# Patient Record
Sex: FEMALE | Race: WHITE | NOT HISPANIC OR LATINO | Employment: OTHER | URBAN - METROPOLITAN AREA
[De-identification: names, ages, dates, MRNs, and addresses within clinical notes are randomized per-mention and may not be internally consistent; named-entity substitution may affect disease eponyms.]

---

## 2016-10-04 LAB — PHQ9 SCORE: 20

## 2016-10-18 LAB — PHQ9 SCORE: 15

## 2016-11-08 LAB — PHQ9 SCORE: 18

## 2016-11-10 LAB — PHQ9 SCORE: 17

## 2016-11-22 LAB — PHQ9 SCORE: 11

## 2016-12-12 LAB — PHQ9 SCORE: 12

## 2016-12-14 LAB — PHQ9 SCORE: 12

## 2017-01-04 ENCOUNTER — APPOINTMENT (OUTPATIENT)
Dept: GENERAL RADIOLOGY | Facility: CLINIC | Age: 27
End: 2017-01-04
Attending: EMERGENCY MEDICINE
Payer: MEDICAID

## 2017-01-04 ENCOUNTER — HOSPITAL ENCOUNTER (EMERGENCY)
Facility: CLINIC | Age: 27
Discharge: HOME | End: 2017-01-05
Attending: EMERGENCY MEDICINE | Admitting: EMERGENCY MEDICINE
Payer: MEDICAID

## 2017-01-04 DIAGNOSIS — T18.5XXA FOREIGN BODY IN ANUS AND RECTUM, INITIAL ENCOUNTER: ICD-10-CM

## 2017-01-04 LAB
ANION GAP SERPL CALCULATED.3IONS-SCNC: 7 MMOL/L (ref 3–14)
BASOPHILS # BLD AUTO: 0 10E9/L (ref 0–0.2)
BASOPHILS NFR BLD AUTO: 0.1 %
BUN SERPL-MCNC: 10 MG/DL (ref 7–30)
CALCIUM SERPL-MCNC: 8.7 MG/DL (ref 8.5–10.1)
CHLORIDE SERPL-SCNC: 114 MMOL/L (ref 94–109)
CO2 SERPL-SCNC: 23 MMOL/L (ref 20–32)
CREAT SERPL-MCNC: 0.59 MG/DL (ref 0.52–1.04)
DIFFERENTIAL METHOD BLD: ABNORMAL
EOSINOPHIL # BLD AUTO: 0.1 10E9/L (ref 0–0.7)
EOSINOPHIL NFR BLD AUTO: 0.8 %
ERYTHROCYTE [DISTWIDTH] IN BLOOD BY AUTOMATED COUNT: 13.4 % (ref 10–15)
GFR SERPL CREATININE-BSD FRML MDRD: ABNORMAL ML/MIN/1.7M2
GLUCOSE SERPL-MCNC: 100 MG/DL (ref 70–99)
HCT VFR BLD AUTO: 35.5 % (ref 35–47)
HGB BLD-MCNC: 11.6 G/DL (ref 11.7–15.7)
IMM GRANULOCYTES # BLD: 0 10E9/L (ref 0–0.4)
IMM GRANULOCYTES NFR BLD: 0.1 %
LYMPHOCYTES # BLD AUTO: 1.5 10E9/L (ref 0.8–5.3)
LYMPHOCYTES NFR BLD AUTO: 15.5 %
MCH RBC QN AUTO: 29.2 PG (ref 26.5–33)
MCHC RBC AUTO-ENTMCNC: 32.7 G/DL (ref 31.5–36.5)
MCV RBC AUTO: 89 FL (ref 78–100)
MONOCYTES # BLD AUTO: 0.7 10E9/L (ref 0–1.3)
MONOCYTES NFR BLD AUTO: 6.8 %
NEUTROPHILS # BLD AUTO: 7.6 10E9/L (ref 1.6–8.3)
NEUTROPHILS NFR BLD AUTO: 76.7 %
NRBC # BLD AUTO: 0 10*3/UL
NRBC BLD AUTO-RTO: 0 /100
PHQ9 SCORE: 13
PLATELET # BLD AUTO: 261 10E9/L (ref 150–450)
POTASSIUM SERPL-SCNC: 3.5 MMOL/L (ref 3.4–5.3)
RBC # BLD AUTO: 3.97 10E12/L (ref 3.8–5.2)
SODIUM SERPL-SCNC: 144 MMOL/L (ref 133–144)
WBC # BLD AUTO: 9.9 10E9/L (ref 4–11)

## 2017-01-04 PROCEDURE — 25000125 ZZHC RX 250: Performed by: SURGERY

## 2017-01-04 PROCEDURE — 96375 TX/PRO/DX INJ NEW DRUG ADDON: CPT | Performed by: EMERGENCY MEDICINE

## 2017-01-04 PROCEDURE — 99285 EMERGENCY DEPT VISIT HI MDM: CPT | Mod: 25 | Performed by: EMERGENCY MEDICINE

## 2017-01-04 PROCEDURE — 46608 ANOSCOPY REMOVE FOR BODY: CPT | Performed by: EMERGENCY MEDICINE

## 2017-01-04 PROCEDURE — 96376 TX/PRO/DX INJ SAME DRUG ADON: CPT | Mod: 59 | Performed by: EMERGENCY MEDICINE

## 2017-01-04 PROCEDURE — 85025 COMPLETE CBC W/AUTO DIFF WBC: CPT | Performed by: EMERGENCY MEDICINE

## 2017-01-04 PROCEDURE — 25000125 ZZHC RX 250: Performed by: EMERGENCY MEDICINE

## 2017-01-04 PROCEDURE — 96374 THER/PROPH/DIAG INJ IV PUSH: CPT | Performed by: EMERGENCY MEDICINE

## 2017-01-04 PROCEDURE — 80048 BASIC METABOLIC PNL TOTAL CA: CPT | Performed by: EMERGENCY MEDICINE

## 2017-01-04 PROCEDURE — 99285 EMERGENCY DEPT VISIT HI MDM: CPT | Mod: Z6 | Performed by: EMERGENCY MEDICINE

## 2017-01-04 PROCEDURE — 74000 XR ABDOMEN 1 VW: CPT

## 2017-01-04 RX ORDER — HYDROMORPHONE HYDROCHLORIDE 1 MG/ML
.5-1 INJECTION, SOLUTION INTRAMUSCULAR; INTRAVENOUS; SUBCUTANEOUS ONCE
Status: COMPLETED | OUTPATIENT
Start: 2017-01-04 | End: 2017-01-04

## 2017-01-04 RX ORDER — LIDOCAINE HYDROCHLORIDE AND EPINEPHRINE 10; 10 MG/ML; UG/ML
INJECTION, SOLUTION INFILTRATION; PERINEURAL
Status: DISCONTINUED
Start: 2017-01-04 | End: 2017-01-05 | Stop reason: HOSPADM

## 2017-01-04 RX ORDER — ONDANSETRON 2 MG/ML
4 INJECTION INTRAMUSCULAR; INTRAVENOUS ONCE
Status: COMPLETED | OUTPATIENT
Start: 2017-01-04 | End: 2017-01-04

## 2017-01-04 RX ADMIN — ONDANSETRON 4 MG: 2 INJECTION INTRAMUSCULAR; INTRAVENOUS at 23:19

## 2017-01-04 RX ADMIN — HYDROMORPHONE HYDROCHLORIDE 1 MG: 10 INJECTION, SOLUTION INTRAMUSCULAR; INTRAVENOUS; SUBCUTANEOUS at 23:19

## 2017-01-04 RX ADMIN — ONDANSETRON 4 MG: 2 INJECTION INTRAMUSCULAR; INTRAVENOUS at 20:15

## 2017-01-04 RX ADMIN — MIDAZOLAM 0.5 MG: 1 INJECTION INTRAMUSCULAR; INTRAVENOUS at 23:32

## 2017-01-04 RX ADMIN — HYDROMORPHONE HYDROCHLORIDE 1 MG: 1 INJECTION, SOLUTION INTRAMUSCULAR; INTRAVENOUS; SUBCUTANEOUS at 19:56

## 2017-01-04 ASSESSMENT — ENCOUNTER SYMPTOMS
FEVER: 0
NAUSEA: 0
SHORTNESS OF BREATH: 0
VOMITING: 0
ABDOMINAL PAIN: 0

## 2017-01-04 NOTE — ED AVS SNAPSHOT
[facility], Emergency Department    [address]    [facility] [address]    Phone:  [phone]    Fax:  [phone]                                       Nevin Alvarado   MRN: [id number]    Department:  [facility], Emergency Department   Date of Visit:  1/4/2017           After Visit Summary Signature Page     I have received my discharge instructions, and my questions have been answered. I have discussed any challenges I see with this plan with the nurse or doctor.    ..........................................................................................................................................  Patient/Patient Representative Signature      ..........................................................................................................................................  Patient Representative Print Name and Relationship to Patient    ..................................................               ................................................  Date                                            Time    ..........................................................................................................................................  Reviewed by Signature/Title    ...................................................              ..............................................  Date                                                            Time

## 2017-01-05 VITALS
SYSTOLIC BLOOD PRESSURE: 129 MMHG | TEMPERATURE: 97.8 F | HEIGHT: 62 IN | OXYGEN SATURATION: 97 % | DIASTOLIC BLOOD PRESSURE: 77 MMHG | HEART RATE: 98 BPM | RESPIRATION RATE: 16 BRPM

## 2017-01-05 NOTE — DISCHARGE INSTRUCTIONS
You have been seen in the ER for a rectal foreign body.  We have removed this.  You are likely to be sore tomorrow.  This is normal.  You may notice a small amount of bleeding which will be normal for a day or so.    You should increase your miralax to twice a day for the next 3-5 days to keep your stool very soft.     We recommend avoiding using any sex toys or foreign bodies in the rectum, as these can cause significant injury and some people even require surgery and colostomies to fix injury that is caused by this.    If you notice continuing bleeding, fever > 101 or severe pain, come back to the ER for a recheck.     If you have any ongoing rectal issues, you can certainly follow up in the colorectal clinic listed below.      Please make an appointment to follow up with [facility] (phone: [phone]).
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
## 2017-01-09 ENCOUNTER — OFFICE VISIT (OUTPATIENT)
Dept: INTERNAL MEDICINE | Facility: CLINIC | Age: 27
End: 2017-01-09
Payer: MEDICAID

## 2017-01-09 VITALS
OXYGEN SATURATION: 98 % | HEART RATE: 86 BPM | DIASTOLIC BLOOD PRESSURE: 70 MMHG | WEIGHT: 227.4 LBS | HEIGHT: 62 IN | SYSTOLIC BLOOD PRESSURE: 110 MMHG | BODY MASS INDEX: 41.85 KG/M2 | TEMPERATURE: 98.4 F

## 2017-01-09 DIAGNOSIS — Z76.89 ENCOUNTER TO ESTABLISH CARE: Primary | ICD-10-CM

## 2017-01-09 DIAGNOSIS — Z76.89 REFERRAL OF PATIENT: ICD-10-CM

## 2017-01-09 PROCEDURE — 99203 OFFICE O/P NEW LOW 30 MIN: CPT | Performed by: INTERNAL MEDICINE

## 2017-01-09 RX ORDER — IBUPROFEN 200 MG
600 TABLET ORAL EVERY 6 HOURS PRN
Qty: 100 TABLET | COMMUNITY
Start: 2017-01-09 | End: 2017-03-07

## 2017-01-09 NOTE — PROGRESS NOTES
"  SUBJECTIVE:                                                      HPI: Nevin Alvarado is a pleasant 26 year old female who presents to establish care:    Previous care at [facility] and [facility] - outside records reviewed.     Also requests referral to see GI for chronic abdominal pain and re: foreign objects removed from rectum (3 separate incidences since December, 2016).    Past Medical History   Diagnosis Date     Migraine without aura      no known triggers; on Topamax bid and Imitrex PRN     Depression      Borderline personality disorder      Anxiety      H/O self-harm      ADD (attention deficit disorder)      PTSD (post-traumatic stress disorder)      Anorexia nervosa with bulimia      history of; on Topamax     Morbid obesity (H)    Re: migraines: well-controlled with Topamax bid and Imitrex PRN; no known triggers  Re: depression, PTSD, BPD, anxiety, and self-harm: patient reports \"okay\" control:   - on Pristiq   - seeing psychiatry every 1-2 months   - sees therapist every week   - reports recent worsening of mood in last couple of months because therapist had been on maternity leave    - admits to hurting herself more than usual    - getting better now that therapist has returned  Re: ADD: not on treatment for this (as treatment would likely exacerbate other psychiatric diagnoses)  Re: anorexia with bulimia - not active/well-controlled on Topamax  Re: morbid obesity: patient aware, but not watching diet too closely (because of history of anorexia with bulimia) and not exercising    Past Surgical History   Procedure Laterality Date     Mammoplasty reduction Bilateral      Release carpal tunnel Bilateral      Knee surgery Right      removed a small tissue mass from knee     Head & neck surgery  age 7     lymph nodes removed from neck; benign     Laparoscopic ablation endometriosis       Hc remove fecal impaction or fb w anesthesia N/A 12/18/2016     Procedure: REMOVE FECAL IMPACTION/FOREIGN BODY " UNDER ANESTHESIA;  Surgeon: Soham Cano MD;  Location: [facility] OR     Family History   Problem Relation Age of Onset     Type 2 Diabetes Maternal Grandmother      Type 2 Diabetes Paternal Grandmother      Myocardial Infarction No family hx of      CEREBROVASCULAR DISEASE Father 53     Coronary Artery Disease Early Onset No family hx of      Breast Cancer Paternal Grandmother      Ovarian Cancer No family hx of      Colon Cancer No family hx of      Social History Main Topics     Smoking status: Never Smoker      Smokeless tobacco: Never Used     Alcohol Use: No     Drug Use: No     Sexual Activity:     Partners: Male      Comment: IUD - Mirena - placed July, 2015     Social History Narrative    Single.    Living in independent living portion of [employer].    On disability.    No regular exercise.      Allergies   Allergen Reactions     Ancef [Cefazolin Sodium]      Augmentin Swelling     Blood-Group Specific Substance Other (See Comments)     Patient has an anti-Cw and non-specific antibodies. Blood product orders may be delayed. Draw one red top and two purple top tubes for all type/screen/crossmatch orders.     Hydrocodone Nausea and Vomiting     vomiting for days     Influenza Vaccines Other (See Comments)     Oseltamivir Hives     med stopped, PN: med stopped     Vicodin [Hydrocodone-Acetaminophen] Nausea and Vomiting     Cephalosporins Rash     Current Outpatient Prescriptions   Medication Sig     ibuprofen (ADVIL/MOTRIN) 200 MG tablet Take 3 tablets (600 mg) by mouth every 6 hours as needed for other (Headache)     ondansetron (ZOFRAN) 4 MG tablet Take 1 tablet (4 mg) by mouth every 6 hours     FAMOTIDINE PO Take 20 mg by mouth 2 times daily     RaNITidine HCl (ZANTAC PO) Take by mouth daily     Desvenlafaxine Succinate (PRISTIQ PO) Take 100 mg by mouth daily     polyethylene glycol (MIRALAX/GLYCOLAX) powder Take 17 g by mouth daily     ONDANSETRON PO Take 4 mg by mouth      SUMAtriptan Succinate  "(IMITREX PO) Take 50 mg by mouth     FOLIC ACID PO Take 5 mg by mouth daily      Acetaminophen (TYLENOL PO) Take 1,000 mg by mouth every 6 hours as needed      Cholecalciferol (VITAMIN D3 PO) Take 5,000 Units by mouth daily      Topiramate (TOPAMAX PO) Take 50 mg by mouth 2 times daily Take topiramate 100 mg at bedtime; take topiramate 50 mg every morning     Immunization History   Administered Date(s) Administered     Human Papilloma Virus 05/10/2007, 07/20/2007, 12/03/2007     TDAP (ADACEL AGES 11-64) 04/16/2015     OBJECTIVE:                                                      /70 mmHg  Pulse 86  Temp(Src) 98.4  F (36.9  C) (Oral)  Ht 5' 2\" (1.575 m)  Wt 227 lb 6.4 oz (103.148 kg)  BMI 41.58 kg/m2  SpO2 98%  Constitutional: well-appearing  Eyes: normal conjunctivae and lids; pupils equal, round, and reactive to light  Ears, Nose, Mouth, and Throat: normal ears and nose; tympanic membranes visualized and normal; normal lips, teeth, and gums; no oropharyngeal lesions or ulcers  Neck: supple and symmetric; no lymphadenopathy; no thyromegaly or masses  Respiratory: normal respiratory effort; clear to auscultation bilaterally  Cardiovascular: regular rate and rhythm; pedal pulses palpable; no edema  Gastrointestinal: soft, non-tender, non-distended, and bowel sounds present; no organomegaly or masses  Musculoskeletal: normal gait and station; no clubbing or cyanosis  Psych: normal judgment and insight; normal mood and affect; recent and remote memory intact; oriented to time, place, and person    PREVENTATIVE HEALTH                                                      Blood pressure: within normal limits   Mammogram: not medically indicated at this time   Pap: last pap performed 2015; report reviewed and was normal; repeat due in 2018  Colonoscopy: not medically indicated at this time   Dexa: not medically indicated at this time   Screening HCV: not medically indicated at this time   Screening " cholesterol: not medically indicated at this time   Screening diabetes: not medically indicated at this time   STD testing: discussed with and declined by patient  Alcohol misuse screening: alcohol use reviewed - no intervention indicated at this time  Immunizations: reviewed; flu shot DUE - declined    ASSESSMENT/PLAN:                                                       (Z76.89) Encounter to establish care  (primary encounter diagnosis)  Comment:  PMH, PSH, FH, SH, medications, allergies, immunizations, and preventative health measures reviewed.   Plan: flu shot declined - no other preventative health interventions indicated.     (Z76.89) Referral of patient  Comment: requests referral to see GI for chronic abdominal pain and re: foreign objects removed from rectum (3 separate incidences since December, 2016)  Plan: referral placed - patient to schedule.     The instructions on the AVS were discussed and explained to the patient. Patient expressed understanding of instructions.    A total of 30 minutes were spent face-to-face with this patient during this encounter and over half of that time was spent on counseling and coordination of care re: above diagnoses and plans of care.     Sandra Ramos MD   [address] [address]  T: [phone], F: [phone]

## 2017-01-09 NOTE — PATIENT INSTRUCTIONS
STOP Famotidine and Zantac. If recurrent abdominal pain, can come in to discuss further.    STOP Folic Acid.     ---    I have placed a ob/gyn and GI referrals for you.    Please schedule an appointment at your earliest convenience - phone number below.

## 2017-01-09 NOTE — MR AVS SNAPSHOT
After Visit Summary   1/9/2017    Nevin Alvarado    MRN: [id number]           Patient Information     Date Of Birth          1990        Visit Information        Provider Department      1/9/2017 11:30 AM Sandra Ramos MD [facility]        Today's Diagnoses     Referral of patient    -  1       Care Instructions    STOP Famotidine and Zantac. If recurrent abdominal pain, can come in to discuss further.    STOP Folic Acid.     ---    I have placed a ob/gyn and GI referrals for you.    Please schedule an appointment at your earliest convenience - phone number below.             Follow-ups after your visit        Additional Services     GASTROENTEROLOGY ADULT REFERRAL +/- PROCEDURE       Your provider has referred you to Gastroenterology Services.    English    Procedure/Referral: REFERRAL ONLY - N: [facility] [phone]   [email or web address]    Please be aware that coverage of these services is subject to the terms and limitations of your health insurance plan.  Call member services at your health plan with any benefit or coverage questions.  Any procedures must be performed at a [facility] facility OR coordinated by your clinic's referral office.    Please bring the following with you to your appointment:    (1) Any X-Rays, CTs or MRIs which have been performed.  Contact the facility where they were done to arrange for  prior to your scheduled appointment.    (2) List of current medications   (3) This referral request   (4) Any documents/labs given to you for this referral            OB/GYN REFERRAL       Your provider has referred you to:  OTHER PROVIDERS: Marianna Ramirez MD - [facility]    Please be aware that coverage of these services is subject to the terms and limitations of your health insurance plan.  Call member services at your health plan with any benefit or coverage questions.      Please bring  "the following with you to your appointment:    (1) Any X-Rays, CTs or MRIs which have been performed.  Contact the facility where they were done to arrange for  prior to your scheduled appointment.   (2) List of current medications   (3) This referral request   (4) Any documents/labs given to you for this referral                  Who to contact     If you have questions or need follow up information about today's clinic visit or your schedule please contact [facility] directly at [phone].  Normal or non-critical lab and imaging results will be communicated to you by [employer], letter or phone within 4 business days after the clinic has received the results. If you do not hear from us within 7 days, please contact the clinic through [employer] or phone. If you have a critical or abnormal lab result, we will notify you by phone as soon as possible.  Submit refill requests through [employer] or call your pharmacy and they will forward the refill request to us. Please allow 3 business days for your refill to be completed.          Additional Information About Your Visit        [employer] Information     [employer] gives you secure access to your electronic health record. If you see a primary care provider, you can also send messages to your care team and make appointments. If you have questions, please call your primary care clinic.  If you do not have a primary care provider, please call [phone] and they will assist you.        Care EveryWhere ID     This is your Care EveryWhere ID. This could be used by other organizations to access your [place] medical records  [id number]        Your Vitals Were     Pulse Temperature Height BMI (Body Mass Index) Pulse Oximetry       86 98.4  F (36.9  C) (Oral) 5' 2\" (1.575 m) 41.58 kg/m2 98%        Blood Pressure from Last 3 Encounters:   01/09/17 110/70   01/05/17 129/77   12/27/16 101/64    Weight from Last 3 Encounters:   01/09/17 227 lb 6.4 oz " (103.148 kg)   12/26/16 228 lb (103.42 kg)   12/18/16 228 lb (103.42 kg)              We Performed the Following     GASTROENTEROLOGY ADULT REFERRAL +/- PROCEDURE     OB/GYN REFERRAL          Today's Medication Changes          These changes are accurate as of: 1/9/17 12:06 PM.  If you have any questions, ask your nurse or doctor.               These medicines have changed or have updated prescriptions.        Dose/Directions    ibuprofen 200 MG tablet   Commonly known as:  ADVIL/MOTRIN   This may have changed:    - how much to take  - when to take this   Changed by:  Sandra Ramos MD        Dose:  600 mg   Take 3 tablets (600 mg) by mouth every 6 hours as needed for other (Headache)   Quantity:  100 tablet   Refills:  0                Primary Care Provider Office Phone # Fax #    Sandra Ramos [phone]       [facility] [address] [facility] [address]        Thank you!     Thank you for choosing [facility]  for your care. Our goal is always to provide you with excellent care. Hearing back from our patients is one way we can continue to improve our services. Please take a few minutes to complete the written survey that you may receive in the mail after your visit with us. Thank you!             Your Updated Medication List - Protect others around you: Learn how to safely use, store and throw away your medicines at www.disposemymeds.org.          This list is accurate as of: 1/9/17 12:06 PM.  Always use your most recent med list.                   Brand Name Dispense Instructions for use    FAMOTIDINE PO      Take 20 mg by mouth 2 times daily       FOLIC ACID PO      Take 5 mg by mouth daily       ibuprofen 200 MG tablet    ADVIL/MOTRIN    100 tablet    Take 3 tablets (600 mg) by mouth every 6 hours as needed for other (Headache)       IMITREX PO      Take 50 mg by mouth       metoclopramide 10 MG tablet    REGLAN    20 tablet    Take 1  tablet (10 mg) by mouth 3 times daily as needed (Nausea or Vomiting)       ondansetron 4 MG tablet    ZOFRAN    8 tablet    Take 1 tablet (4 mg) by mouth every 6 hours       ONDANSETRON PO      Take 4 mg by mouth       polyethylene glycol powder    MIRALAX/GLYCOLAX     Take 17 g by mouth daily       PRISTIQ PO      Take 100 mg by mouth daily       TOPAMAX PO      Take 50 mg by mouth 2 times daily Take topiramate 100 mg at bedtime; take topiramate 50 mg every morning       TYLENOL PO      Take 1,000 mg by mouth every 6 hours as needed       VITAMIN D3 PO      Take 5,000 Units by mouth daily       ZANTAC PO      Take by mouth daily

## 2017-01-09 NOTE — NURSING NOTE
"Chief Complaint   Patient presents with     Establish Care     Switching providers.       Initial /70 mmHg  Pulse 86  Temp(Src) 98.4  F (36.9  C) (Oral)  Ht 5' 2\" (1.575 m)  Wt 227 lb 6.4 oz (103.148 kg)  BMI 41.58 kg/m2  SpO2 98% Estimated body mass index is 41.58 kg/(m^2) as calculated from the following:    Height as of this encounter: 5' 2\" (1.575 m).    Weight as of this encounter: 227 lb 6.4 oz (103.148 kg).  BP completed using cuff size: Large    Kaminibose MA      "

## 2017-01-10 ENCOUNTER — ANESTHESIA (OUTPATIENT)
Dept: SURGERY | Facility: CLINIC | Age: 27
End: 2017-01-10
Payer: MEDICAID

## 2017-01-10 ENCOUNTER — ANESTHESIA EVENT (OUTPATIENT)
Dept: SURGERY | Facility: CLINIC | Age: 27
End: 2017-01-10
Payer: MEDICAID

## 2017-01-10 ENCOUNTER — APPOINTMENT (OUTPATIENT)
Dept: CT IMAGING | Facility: CLINIC | Age: 27
End: 2017-01-10
Attending: EMERGENCY MEDICINE
Payer: MEDICAID

## 2017-01-10 ENCOUNTER — HOSPITAL ENCOUNTER (INPATIENT)
Facility: CLINIC | Age: 27
LOS: 2 days | Discharge: HOME HEALTH | End: 2017-01-13
Attending: EMERGENCY MEDICINE | Admitting: COLON & RECTAL SURGERY
Payer: MEDICAID

## 2017-01-10 DIAGNOSIS — T18.5XXD: Primary | ICD-10-CM

## 2017-01-10 DIAGNOSIS — T18.5XXA RECTAL FOREIGN BODY, INITIAL ENCOUNTER: ICD-10-CM

## 2017-01-10 LAB
ANION GAP SERPL CALCULATED.3IONS-SCNC: 9 MMOL/L (ref 3–14)
BUN SERPL-MCNC: 16 MG/DL (ref 7–30)
CALCIUM SERPL-MCNC: 8.6 MG/DL (ref 8.5–10.1)
CHLORIDE SERPL-SCNC: 112 MMOL/L (ref 94–109)
CO2 SERPL-SCNC: 23 MMOL/L (ref 20–32)
CREAT SERPL-MCNC: 0.58 MG/DL (ref 0.52–1.04)
ERYTHROCYTE [DISTWIDTH] IN BLOOD BY AUTOMATED COUNT: 13.9 % (ref 10–15)
GFR SERPL CREATININE-BSD FRML MDRD: ABNORMAL ML/MIN/1.7M2
GLUCOSE SERPL-MCNC: 98 MG/DL (ref 70–99)
HCT VFR BLD AUTO: 36.9 % (ref 35–47)
HGB BLD-MCNC: 12.2 G/DL (ref 11.7–15.7)
MCH RBC QN AUTO: 29.5 PG (ref 26.5–33)
MCHC RBC AUTO-ENTMCNC: 33.1 G/DL (ref 31.5–36.5)
MCV RBC AUTO: 89 FL (ref 78–100)
PLATELET # BLD AUTO: 250 10E9/L (ref 150–450)
POTASSIUM SERPL-SCNC: 3.7 MMOL/L (ref 3.4–5.3)
RBC # BLD AUTO: 4.14 10E12/L (ref 3.8–5.2)
SODIUM SERPL-SCNC: 143 MMOL/L (ref 133–144)
WBC # BLD AUTO: 10.6 10E9/L (ref 4–11)

## 2017-01-10 PROCEDURE — 80048 BASIC METABOLIC PNL TOTAL CA: CPT | Performed by: SURGERY

## 2017-01-10 PROCEDURE — 25000566 ZZH SEVOFLURANE, EA 15 MIN: Performed by: COLON & RECTAL SURGERY

## 2017-01-10 PROCEDURE — 25000128 H RX IP 250 OP 636: Performed by: EMERGENCY MEDICINE

## 2017-01-10 PROCEDURE — 25000128 H RX IP 250 OP 636: Performed by: ANESTHESIOLOGY

## 2017-01-10 PROCEDURE — 25000125 ZZHC RX 250: Performed by: SURGERY

## 2017-01-10 PROCEDURE — 86905 BLOOD TYPING RBC ANTIGENS: CPT | Performed by: ANESTHESIOLOGY

## 2017-01-10 PROCEDURE — 71000014 ZZH RECOVERY PHASE 1 LEVEL 2 FIRST HR: Performed by: COLON & RECTAL SURGERY

## 2017-01-10 PROCEDURE — 27210794 ZZH OR GENERAL SUPPLY STERILE: Performed by: COLON & RECTAL SURGERY

## 2017-01-10 PROCEDURE — 99285 EMERGENCY DEPT VISIT HI MDM: CPT | Mod: Z6 | Performed by: EMERGENCY MEDICINE

## 2017-01-10 PROCEDURE — 88300 SURGICAL PATH GROSS: CPT | Performed by: COLON & RECTAL SURGERY

## 2017-01-10 PROCEDURE — 36000057 ZZH SURGERY LEVEL 3 1ST 30 MIN - UMMC: Performed by: COLON & RECTAL SURGERY

## 2017-01-10 PROCEDURE — 86880 COOMBS TEST DIRECT: CPT | Performed by: ANESTHESIOLOGY

## 2017-01-10 PROCEDURE — 96374 THER/PROPH/DIAG INJ IV PUSH: CPT

## 2017-01-10 PROCEDURE — 86850 RBC ANTIBODY SCREEN: CPT | Performed by: ANESTHESIOLOGY

## 2017-01-10 PROCEDURE — 0DCE0ZZ EXTIRPATION OF MATTER FROM LARGE INTESTINE, OPEN APPROACH: ICD-10-PCS | Performed by: COLON & RECTAL SURGERY

## 2017-01-10 PROCEDURE — 37000008 ZZH ANESTHESIA TECHNICAL FEE, 1ST 30 MIN: Performed by: COLON & RECTAL SURGERY

## 2017-01-10 PROCEDURE — 25000125 ZZHC RX 250: Performed by: ANESTHESIOLOGY

## 2017-01-10 PROCEDURE — 40000170 ZZH STATISTIC PRE-PROCEDURE ASSESSMENT II: Performed by: COLON & RECTAL SURGERY

## 2017-01-10 PROCEDURE — 0DJD8ZZ INSPECTION OF LOWER INTESTINAL TRACT, VIA NATURAL OR ARTIFICIAL OPENING ENDOSCOPIC: ICD-10-PCS | Performed by: COLON & RECTAL SURGERY

## 2017-01-10 PROCEDURE — 86901 BLOOD TYPING SEROLOGIC RH(D): CPT | Performed by: ANESTHESIOLOGY

## 2017-01-10 PROCEDURE — C9399 UNCLASSIFIED DRUGS OR BIOLOG: HCPCS | Performed by: ANESTHESIOLOGY

## 2017-01-10 PROCEDURE — 74176 CT ABD & PELVIS W/O CONTRAST: CPT

## 2017-01-10 PROCEDURE — 85027 COMPLETE CBC AUTOMATED: CPT | Performed by: SURGERY

## 2017-01-10 PROCEDURE — 86870 RBC ANTIBODY IDENTIFICATION: CPT | Performed by: ANESTHESIOLOGY

## 2017-01-10 PROCEDURE — 99285 EMERGENCY DEPT VISIT HI MDM: CPT | Mod: 25

## 2017-01-10 PROCEDURE — 36000059 ZZH SURGERY LEVEL 3 EA 15 ADDTL MIN UMMC: Performed by: COLON & RECTAL SURGERY

## 2017-01-10 PROCEDURE — 71000015 ZZH RECOVERY PHASE 1 LEVEL 2 EA ADDTL HR: Performed by: COLON & RECTAL SURGERY

## 2017-01-10 PROCEDURE — 86860 RBC ANTIBODY ELUTION: CPT | Performed by: ANESTHESIOLOGY

## 2017-01-10 PROCEDURE — 86900 BLOOD TYPING SEROLOGIC ABO: CPT | Performed by: ANESTHESIOLOGY

## 2017-01-10 PROCEDURE — 37000009 ZZH ANESTHESIA TECHNICAL FEE, EACH ADDTL 15 MIN: Performed by: COLON & RECTAL SURGERY

## 2017-01-10 PROCEDURE — 25000125 ZZHC RX 250: Performed by: EMERGENCY MEDICINE

## 2017-01-10 RX ORDER — SODIUM CHLORIDE 9 MG/ML
INJECTION, SOLUTION INTRAVENOUS CONTINUOUS
Status: DISCONTINUED | OUTPATIENT
Start: 2017-01-10 | End: 2017-01-11

## 2017-01-10 RX ORDER — ONDANSETRON 2 MG/ML
4 INJECTION INTRAMUSCULAR; INTRAVENOUS EVERY 30 MIN PRN
Status: DISCONTINUED | OUTPATIENT
Start: 2017-01-10 | End: 2017-01-11

## 2017-01-10 RX ORDER — LIDOCAINE HYDROCHLORIDE 20 MG/ML
INJECTION, SOLUTION INFILTRATION; PERINEURAL PRN
Status: DISCONTINUED | OUTPATIENT
Start: 2017-01-10 | End: 2017-01-11

## 2017-01-10 RX ORDER — GLYCOPYRROLATE 0.2 MG/ML
INJECTION, SOLUTION INTRAMUSCULAR; INTRAVENOUS PRN
Status: DISCONTINUED | OUTPATIENT
Start: 2017-01-10 | End: 2017-01-11

## 2017-01-10 RX ORDER — FENTANYL CITRATE 50 UG/ML
INJECTION, SOLUTION INTRAMUSCULAR; INTRAVENOUS PRN
Status: DISCONTINUED | OUTPATIENT
Start: 2017-01-10 | End: 2017-01-11

## 2017-01-10 RX ORDER — ONDANSETRON 2 MG/ML
INJECTION INTRAMUSCULAR; INTRAVENOUS PRN
Status: DISCONTINUED | OUTPATIENT
Start: 2017-01-10 | End: 2017-01-11

## 2017-01-10 RX ORDER — CIPROFLOXACIN 2 MG/ML
400 INJECTION, SOLUTION INTRAVENOUS SEE ADMIN INSTRUCTIONS
Status: DISCONTINUED | OUTPATIENT
Start: 2017-01-10 | End: 2017-01-10

## 2017-01-10 RX ORDER — PROPOFOL 10 MG/ML
INJECTION, EMULSION INTRAVENOUS PRN
Status: DISCONTINUED | OUTPATIENT
Start: 2017-01-10 | End: 2017-01-11

## 2017-01-10 RX ORDER — MORPHINE SULFATE 4 MG/ML
4 INJECTION, SOLUTION INTRAMUSCULAR; INTRAVENOUS
Status: DISCONTINUED | OUTPATIENT
Start: 2017-01-10 | End: 2017-01-12

## 2017-01-10 RX ORDER — SODIUM CHLORIDE 9 MG/ML
INJECTION, SOLUTION INTRAVENOUS CONTINUOUS PRN
Status: DISCONTINUED | OUTPATIENT
Start: 2017-01-10 | End: 2017-01-11

## 2017-01-10 RX ORDER — CIPROFLOXACIN 2 MG/ML
400 INJECTION, SOLUTION INTRAVENOUS
Status: DISCONTINUED | OUTPATIENT
Start: 2017-01-10 | End: 2017-01-10

## 2017-01-10 RX ORDER — EPHEDRINE SULFATE 50 MG/ML
INJECTION, SOLUTION INTRAMUSCULAR; INTRAVENOUS; SUBCUTANEOUS PRN
Status: DISCONTINUED | OUTPATIENT
Start: 2017-01-10 | End: 2017-01-11

## 2017-01-10 RX ORDER — CIPROFLOXACIN 2 MG/ML
400 INJECTION, SOLUTION INTRAVENOUS
Status: COMPLETED | OUTPATIENT
Start: 2017-01-10 | End: 2017-01-10

## 2017-01-10 RX ADMIN — GLYCOPYRROLATE 0.6 MG: 0.2 INJECTION, SOLUTION INTRAMUSCULAR; INTRAVENOUS at 23:18

## 2017-01-10 RX ADMIN — FENTANYL CITRATE 50 MCG: 50 INJECTION, SOLUTION INTRAMUSCULAR; INTRAVENOUS at 23:30

## 2017-01-10 RX ADMIN — FENTANYL CITRATE 50 MCG: 50 INJECTION, SOLUTION INTRAMUSCULAR; INTRAVENOUS at 23:10

## 2017-01-10 RX ADMIN — SODIUM CHLORIDE: 9 INJECTION, SOLUTION INTRAVENOUS at 19:54

## 2017-01-10 RX ADMIN — SUGAMMADEX 200 MG: 100 INJECTION, SOLUTION INTRAVENOUS at 23:58

## 2017-01-10 RX ADMIN — PROPOFOL 30 MG: 10 INJECTION, EMULSION INTRAVENOUS at 23:31

## 2017-01-10 RX ADMIN — ROCURONIUM BROMIDE 50 MG: 10 INJECTION INTRAVENOUS at 21:50

## 2017-01-10 RX ADMIN — SODIUM CHLORIDE: 9 INJECTION, SOLUTION INTRAVENOUS at 21:42

## 2017-01-10 RX ADMIN — METRONIDAZOLE 500 MG: 500 INJECTION, SOLUTION INTRAVENOUS at 23:09

## 2017-01-10 RX ADMIN — CIPROFLOXACIN 400 MG: 2 INJECTION INTRAVENOUS at 23:09

## 2017-01-10 RX ADMIN — ROCURONIUM BROMIDE 10 MG: 10 INJECTION INTRAVENOUS at 23:24

## 2017-01-10 RX ADMIN — LIDOCAINE HYDROCHLORIDE 100 MG: 20 INJECTION, SOLUTION INFILTRATION; PERINEURAL at 21:50

## 2017-01-10 RX ADMIN — ONDANSETRON 4 MG: 2 INJECTION INTRAMUSCULAR; INTRAVENOUS at 23:31

## 2017-01-10 RX ADMIN — HYDROMORPHONE HYDROCHLORIDE 0.2 MG: 1 INJECTION, SOLUTION INTRAMUSCULAR; INTRAVENOUS; SUBCUTANEOUS at 23:40

## 2017-01-10 RX ADMIN — HYDROMORPHONE HYDROCHLORIDE 0.3 MG: 1 INJECTION, SOLUTION INTRAMUSCULAR; INTRAVENOUS; SUBCUTANEOUS at 23:33

## 2017-01-10 RX ADMIN — PROPOFOL 50 MG: 10 INJECTION, EMULSION INTRAVENOUS at 23:10

## 2017-01-10 RX ADMIN — MIDAZOLAM HYDROCHLORIDE 2 MG: 1 INJECTION, SOLUTION INTRAMUSCULAR; INTRAVENOUS at 21:45

## 2017-01-10 RX ADMIN — MORPHINE SULFATE 4 MG: 4 INJECTION, SOLUTION INTRAMUSCULAR; INTRAVENOUS at 20:26

## 2017-01-10 RX ADMIN — PROPOFOL 150 MG: 10 INJECTION, EMULSION INTRAVENOUS at 21:50

## 2017-01-10 RX ADMIN — ONDANSETRON 4 MG: 2 INJECTION INTRAMUSCULAR; INTRAVENOUS at 20:26

## 2017-01-10 RX ADMIN — Medication 10 MG: at 23:18

## 2017-01-10 RX ADMIN — PHENYLEPHRINE HYDROCHLORIDE 50 MCG: 10 INJECTION, SOLUTION INTRAMUSCULAR; INTRAVENOUS; SUBCUTANEOUS at 22:01

## 2017-01-10 RX ADMIN — FENTANYL CITRATE 100 MCG: 50 INJECTION, SOLUTION INTRAMUSCULAR; INTRAVENOUS at 21:50

## 2017-01-10 ASSESSMENT — ENCOUNTER SYMPTOMS
VOMITING: 0
DIFFICULTY URINATING: 0
BLOOD IN STOOL: 0
FLANK PAIN: 0
NAUSEA: 0
ANAL BLEEDING: 1
RECTAL PAIN: 0
FREQUENCY: 1
FEVER: 0
HEMATURIA: 0
ABDOMINAL PAIN: 1
DYSURIA: 0

## 2017-01-10 NOTE — LETTER
Transition Communication Hand-off for Care Transitions to Next Level of Care Provider    Name: Nevin Alvarado  MRN #: [id number]  Primary Care Provider: Sandra Ramos     Primary Clinic:  [facility] [address]  [facility] [address]     Reason for Hospitalization:  Rectal foreign body, initial encounter [T18.5XXA]  Admit Date/Time: 1/10/2017  6:27 PM  Discharge Date: 1/13/17  Payor Source: [place] / Product Type: Medicaid /     Readmission Assessment Measure (MISAEL) Risk Score/category:     Plan of Care Goals/Milestone Events:   Patient Concerns: Safety at home   Patient Goals:   Short-term Rehab with OT post procedure.   Long-term Work with pt on safe sex practices and consent given multiple admissions for items in rectum.   Medical Goals   Short-term Home nursing, OT, PT ordered.   Long-term None         Reason for Communication Hand-off Referral: Fragility    Discharge Plan:  Discharge home with Home PT/OT/Nursing/HHA ([facility]), pt has several services for mental health already established in the community.  Pt is high risk for readmission due to mental health needs.       Concern for non-adherence with plan of care:   Y/N Y  Discharge Needs Assessment:  Needs       Most Recent Value    Anticipated Changes Related to Illness none    Equipment Currently Used at Home none    Transportation Available public transportation    [facility] [phone], Fax: [phone]    Transportation Agency -- [[facility]]    Home [facility] [phone], Fax: [phone]          Already enrolled in Tele-monitoring program and name of program:  Unknown  Follow-up specialty is recommended:  Homecare ordered    Follow-up plan:  No future appointments.    Any outstanding tests or procedures:        Referrals     Future Labs/Procedures    Home care nursing referral     Comments:    [facility]   Phone:  [phone]  Fax: [phone]    For RN ulysses post hospitalization.   Assess vital signs, respiratory and cardiac status, activity tolerance, hydration, nutritional status.  HHA to assist with showering 3 times per week.     Your provider has ordered home care nursing services. If you have not been contacted within 2 days of your discharge please call the inpatient department phone number at [phone] .            [employer] Recommendations:      Lisa Valladares    AVS/Discharge Summary is the source of truth; this is a helpful guide for improved communication of patient story

## 2017-01-10 NOTE — IP AVS SNAPSHOT
[address] [address]    Phone:  [phone]                                       After Visit Summary   1/10/2017    Nevin Alvarado    MRN: [id number]           After Visit Summary Signature Page     I have received my discharge instructions, and my questions have been answered. I have discussed any challenges I see with this plan with the nurse or doctor.    ..........................................................................................................................................  Patient/Patient Representative Signature      ..........................................................................................................................................  Patient Representative Print Name and Relationship to Patient    ..................................................               ................................................  Date                                            Time    ..........................................................................................................................................  Reviewed by Signature/Title    ...................................................              ..............................................  Date                                                            Time

## 2017-01-10 NOTE — IP AVS SNAPSHOT
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   Glucose 99 70 - 99 mg/dL  51   Urea Nitrogen 14 7 - 30 mg/dL  51   Creatinine 0.67 0.52 - 1.04 mg/dL  51   GFR Estimate -- >60 mL/min/1.7m2  51   Result:         >90  Non  GFR Calc     GFR Estimate If Black -- >60 mL/min/1.7m2  51   Result:         >90   GFR Calc     Calcium 7.8 8.5 - 10.1 mg/dL L 51   Result:              Magnesium [[id number]]  Resulted: 01/11/17 1026, Result status: Final result    Ordering provider: Martha Talbot MD  01/11/17 0903 Resulting lab: [facility]    Specimen Information    Type Source Collected On   Blood  01/11/17 0845          Components       Value Reference Range Flag Lab   Magnesium 1.8 1.6 - 2.3 mg/dL  51            Phosphorus [[id number]]  Resulted: 01/11/17 1026, Result status: Final result    Ordering provider: Martha Talbot MD  01/11/17 0903 Resulting lab: [facility]    Specimen Information    Type Source Collected On   Blood  01/11/17 0845          Components       Value  Reference Range Flag Lab   Phosphorus 3.0 2.5 - 4.5 mg/dL  51            CBC with platelets [[id number]] (Abnormal)  Resulted: 01/11/17 0944, Result status: Final result    Ordering provider: Martha Talbot MD  01/11/17 0903 Resulting lab: [facility]    Specimen Information    Type Source Collected On   Blood  01/11/17 0845          Components       Value Reference Range Flag Lab   WBC 11.9 4.0 - 11.0 10e9/L H 51   RBC Count 3.85 3.8 - 5.2 10e12/L  51   Hemoglobin 11.2 11.7 - 15.7 g/dL L 51   Hematocrit 35.2 35.0 - 47.0 %  51   MCV 91 78 - 100 fl  51   MCH 29.1 26.5 - 33.0 pg  51   MCHC 31.8 31.5 - 36.5 g/dL  51   RDW 14.1 10.0 - 15.0 %  51   Platelet Count 218 150 - 450 10e9/L  51            Creatinine [[id number]]  Resulted: 01/11/17 0920, Result status: Final result    Ordering provider: Shaka Cortes MD  01/11/17 0326 Resulting lab: [facility]    Specimen Information    Type Source Collected On   Blood  01/11/17 0845          Components       Value Reference Range Flag Lab   Creatinine 0.60 0.52 - 1.04 mg/dL  51   GFR Estimate -- >60 mL/min/1.7m2  51   Result:         >90  Non  GFR Calc     GFR Estimate If Black -- >60 mL/min/1.7m2  51   Result:         >90   GFR Calc              Platelet count [[id number]]  Resulted: 01/11/17 0853, Result status: Final result    Ordering provider: Shaka Cortes MD  01/11/17 9636 Resulting lab: [facility]    Specimen Information    Type Source Collected On   Blood  01/11/17 0845          Components       Value Reference Range Flag Lab   Platelet Count 216 150 - 450 10e9/L  51            Basic metabolic panel [[id number]] (Abnormal)  Resulted: 01/10/17 2224, Result status: Final result    Ordering provider: Shaka Cortes MD  01/10/17 1932 Resulting lab: [facility]    Specimen Information     Type Source Collected On   Blood  01/10/17 2135          Components       Value Reference Range Flag Lab   Sodium 143 133 - 144 mmol/L  51   Potassium 3.7 3.4 - 5.3 mmol/L  51   Chloride 112 94 - 109 mmol/L H 51   Carbon Dioxide 23 20 - 32 mmol/L  51   Anion Gap 9 3 - 14 mmol/L  51   Glucose 98 70 - 99 mg/dL  51   Urea Nitrogen 16 7 - 30 mg/dL  51   Creatinine 0.58 0.52 - 1.04 mg/dL  51   GFR Estimate -- >60 mL/min/1.7m2  51   Result:         >90  Non  GFR Calc     GFR Estimate If Black -- >60 mL/min/1.7m2  51   Result:         >90   GFR Calc     Calcium 8.6 8.5 - 10.1 mg/dL  51   Result:              CBC with platelets [[id number]]  Resulted: 01/10/17 2201, Result status: Final result    Ordering provider: Shaka Cortes MD  01/10/17 2142 Resulting lab: [facility]    Specimen Information    Type Source Collected On   Blood  01/10/17 2135          Components       Value Reference Range Flag Lab   WBC 10.6 4.0 - 11.0 10e9/L  51   RBC Count 4.14 3.8 - 5.2 10e12/L  51   Hemoglobin 12.2 11.7 - 15.7 g/dL  51   Hematocrit 36.9 35.0 - 47.0 %  51   MCV 89 78 - 100 fl  51   MCH 29.5 26.5 - 33.0 pg  51   MCHC 33.1 31.5 - 36.5 g/dL  51   RDW 13.9 10.0 - 15.0 %  51   Platelet Count 250 150 - 450 10e9/L  51            Testing Performed By     Lab - Abbreviation Name Director Address Valid Date Range    45 - AQI436 [facility] Unknown Unknown 01/28/02 0000 - Present    51 - Unknown [facility] Unknown [address] [facility] [address] 12/31/14 1010 - Present    75 - Unknown [facility] Unknown [address] [facility] [address] 01/15/15 1019 - Present    170 - Unknown POINT OF CARE TEST, GLUCOSE Unknown Unknown 10/31/11 1114 - Present               Imaging Results - 3 Days (01/10/17 - 01/10/17)      CT Abdomen Pelvis w/o Contrast [[id number]]  Resulted: 01/10/17 2024, Result status: Final result     Ordering provider: Estrada Bee MD  01/10/17 1846 Resulted by: Anurag Dunham MD    Performed: 01/10/17 1941 - 01/10/17 1941 Resulting lab: RADIOLOGY RESULTS    Narrative:       CT ABDOMEN AND PELVIS WITHOUT CONTRAST  1/10/2017 7:41 PM    HISTORY: Rectal foreign body. Evaluate for perforation.    COMPARISON: A radiograph on 1/4/2017 and a CT on 10/19/2014.    TECHNIQUE: Routine transverse CT imaging of the abdomen and pelvis was  performed without contrast. Radiation dose for this scan was reduced  using automated exposure control, adjustment of the mA and/or kV  according to patient size, or iterative reconstruction technique.    FINDINGS: The visualized lung bases are clear. The liver, spleen,  pancreas, gallbladder, adrenal glands, kidneys, and bladder are  normal. No enlarged lymph node or other abnormal mass is demonstrated.  No free fluid is seen. No free intraperitoneal gas is identified. An  intrauterine device is seen within the uterus. There is a large  foreign body within the rectum. This measures approximately 12 cm in  length x 4 cm in diameter. Reportedly, this is a glass bottle. There  appears to be a pump device at one end with a radiolucent cap. The  remainder of the gastrointestinal tract is unremarkable. There is a  normal-appearing appendix. No vascular abnormality is seen. The  osseous structures are unremarkable. No abdominal or pelvic wall  pathology is demonstrated.       Impression:       IMPRESSION: Large rectal foreign body, consistent with a glass bottle.  There is no evidence of perforation.    ANURAG DUNHAM MD    Specimen Information    Type Source Collected On                  Testing Performed By     Lab - Abbreviation Name Director Address Valid Date Range    104 - Rad lts RADIOLOGY RESULTS Unknown Unknown 02/16/05 1553 - Present            Encounter-Level Documents:     There are no encounter-level documents.      Order-Level Documents:     There are no  order-level documents.

## 2017-01-10 NOTE — IP AVS SNAPSHOT
MRN:[id number]                      After Visit Summary   1/10/2017    Nevin Alvarado    MRN: [id number]           Thank you!     Thank you for choosing [facility] for your care. Our goal is always to provide you with excellent care. Hearing back from our patients is one way we can continue to improve our services. Please take a few minutes to complete the written survey that you may receive in the mail after you visit with us. Thank you!        Patient Information     Date Of Birth          1990        About your hospital stay     You were admitted on:  January 11, 2017 You last received care in the:  [address] [facility]    You were discharged on:  January 13, 2017        Reason for your hospital stay       Rectal foreign body, prophylactic treatment for gonorrhea and chlamydia                  Who to Call     For medical emergencies, please call 911.  For non-urgent questions about your medical care, please call your primary care provider or clinic, [phone]  For questions related to your surgery, please call your surgery clinic        Attending Provider     Provider    Estrada Bee MD Melton-Meaux, Genevieve B, MD       Primary Care Provider Office Phone # Fax #    Sandra Ramos [phone]       [facility] [address] [facility] [address]        After Care Instructions     Activity       See discharge instructions.            Diet       Regular diet            Discharge Instructions       DIET  -You may eat a regular diet. We recommend high protein, lean foods to help with healing.  -We recommend eating slowly, chewing thoroughly, eating small frequent meals throughout the day  -Stay well hydrated    ACTIVITY  -No lifting, pushing, pulling greater than 10 lbs and no strenuous exercise for 6 weeks   -You may shower, but do not soak in tub or pool  -No driving while on narcotic analgesics (i.e. Percocet, oxycodone,  Vicodin)  -No driving until you are able to fully twist to both sides quickly and without any pain    WOUND CLINIC  -Inspect your wounds daily for signs of infection (increased redness, drainage, pain)  -Keep your wound clean and dry  -You have dissolvable stitches so do not need to have anything removed like staples.    NOTIFY  Please contact Ivonne Chávez RN or Edwige Powell RN at [phone] for problems after discharge such as:  -Temperature > 101F, chills, rigors, dizziness  -Redness around or purulent drainage from wound  -Inability to tolerate diet, nausea or vomiting  -You stop passing gas, develop significant bloating, abdominal pain  -Have blood in stools/vomit  -Have severe diarrhea/constipation  -Any other questions or concerns.  -At nights (after 4:30pm), on weekends, or if urgent, call [phone] and ask the  to speak with the on-call Colorectal Surgery resident or fellow    Medication Instructions  Some of your medications may have changed. Please take only prescribed and resumed medications. We recommend taking Miralax daily to keep stools soft.                  Follow-up Appointments     Adult [facility] Follow-up and recommended labs and tests       1.  You will need to follow-up with Tea Ramon NP in the Colon and Rectal Surgery clinic in 1 week(s) and Dr. Haynes in 2-3 week(s).  Please contact our clinic scheduler Gretel Castaneda or Marybeth Lafleur (phone # [phone]) if you have not heard from our clinic in 3 business days afer discharge to schedule a follow-up appointment.  2.  Follow up with your primary care provider in 1 week after discharge from the hospital to review this hospitalization. You should discuss if your vaginal discharge has improved, as well as discuss generally how you are doing since leaving the hospital.  3.  Follow up with your therapist in 1 week to discuss how you are doing since leaving the hospital. We recommend you continue to discuss  coping mechanisms for stress and how to navigate intimate relationships with consent.    Appointments on [place] and/or [facility] (with [facility] or [facility] provider or service). Call [phone] if you haven't heard regarding these appointments within 7 days of discharge.                  Additional Services     Home care nursing referral       [facility]   Phone: [phone]  Fax: [phone]    For RN ulysses post hospitalization.   Assess vital signs, respiratory and cardiac status, activity tolerance, hydration, nutritional status.  HHA to assist with showering 3 times per week.     Your provider has ordered home care nursing services. If you have not been contacted within 2 days of your discharge please call the inpatient department phone number at [phone] .                  Additional Information     If you use hormonal birth control (such as the pill, patch, ring or implants): You'll need a second form of birth control for 7 days (condoms, a diaphragm or contraceptive foam). While in the hospital, you received a medicine called Bridion. Your normal birth control will not work as well for a week after taking this medicine.          Pending Results     Date and Time Order Name Status Description    1/11/2017 0845 Anti Treponema In process     1/11/2017 0845 HIV Antigen Antibody Combo In process             Statement of Approval     Ordered          01/13/17 0424  I have reviewed and agree with all the recommendations and orders detailed in this document.   EFFECTIVE NOW     Approved and electronically signed by:  Martha Talbot MD             Admission Information        Provider Department Dept Phone    1/10/2017 Annmarie Haynes MD  [facility] [phone]      Your Vitals Were     Blood Pressure Pulse Temperature Respirations Weight Pulse Oximetry    132/74 mmHg 82 98.3  F (36.8  C) (Oral) 18 104.736 kg (230 lb 14.4 oz) 97%      MyChart Information     [employer] gives you secure access  to your electronic health record. If you see a primary care provider, you can also send messages to your care team and make appointments. If you have questions, please call your primary care clinic.  If you do not have a primary care provider, please call [phone] and they will assist you.        Care EveryWhere ID     This is your Care EveryWhere ID. This could be used by other organizations to access your [place] medical records  [id number]           Review of your medicines      START taking        Dose / Directions    lidocaine 5 % Patch   Commonly known as:  LIDODERM   Used for:  Rectal foreign body, subsequent encounter        Dose:  1 patch   Place 1 patch onto the skin every 24 hours May substitute over the counter 4% lidocaine patches instead. Place on abdomen for incisional pain.   Quantity:  30 patch   Refills:  3       * order for DME   Used for:  Rectal foreign body, subsequent encounter        Equipment being ordered: Other: shower chair Treatment Diagnosis: s/p exploratory laparotomy for rectal foreign body   Quantity:  1 each   Refills:  0       * order for DME   Used for:  Rectal foreign body, initial encounter        Equipment being ordered: Walker Wheels (), Walker () and Bath Seat () Treatment Diagnosis: unstable gait   Quantity:  1 Units   Refills:  0       oxyCODONE-acetaminophen 5-325 MG per tablet   Commonly known as:  PERCOCET   Used for:  Acute post-operative pain        Dose:  1-2 tablet   Take 1-2 tablets by mouth every 8 hours as needed for pain   Quantity:  30 tablet   Refills:  0       * Notice:  This list has 2 medication(s) that are the same as other medications prescribed for you. Read the directions carefully, and ask your doctor or other care provider to review them with you.      CONTINUE these medicines which may have CHANGED, or have new prescriptions. If we are uncertain of the size of tablets/capsules you have at home, strength may be listed as something  that might have changed.        Dose / Directions    polyethylene glycol powder   Commonly known as:  MIRALAX/GLYCOLAX   This may have changed:    - when to take this  - reasons to take this   Used for:  Rectal foreign body, subsequent encounter        Dose:  17 g   Take 17 g by mouth daily as needed for constipation   Quantity:  510 g   Refills:  3         CONTINUE these medicines which have NOT CHANGED        Dose / Directions    * BUSPIRONE HCL PO        Dose:  5 mg   Take 5 mg by mouth every morning   Refills:  0       * BUSPIRONE HCL PO        Dose:  10 mg   Take 10 mg by mouth every evening   Refills:  0       ibuprofen 200 MG tablet   Commonly known as:  ADVIL/MOTRIN        Dose:  600 mg   Take 3 tablets (600 mg) by mouth every 6 hours as needed for other (Headache)   Quantity:  100 tablet   Refills:  0       ondansetron 4 MG tablet   Commonly known as:  ZOFRAN        Dose:  4 mg   Take 1 tablet (4 mg) by mouth every 6 hours   Quantity:  8 tablet   Refills:  0       PRISTIQ PO        Dose:  100 mg   Take 100 mg by mouth daily   Refills:  0       SUMATRIPTAN SUCCINATE PO        Dose:  50 mg   Take 50 mg by mouth once as needed for migraine   Refills:  0       TOPIRAMATE PO   Notes to Patient:  Take 100 mg tonight.        Take 50 mg by mouth in the morning and 100 mg by mouth in the evening   Refills:  0       TYLENOL PO        Dose:  1000 mg   Take 1,000 mg by mouth every 6 hours as needed   Refills:  0       VITAMIN D3 PO        Dose:  5000 Units   Take 5,000 Units by mouth daily   Refills:  0       * Notice:  This list has 2 medication(s) that are the same as other medications prescribed for you. Read the directions carefully, and ask your doctor or other care provider to review them with you.         Where to get your medicines      These medications were [place] [facility] [place] - [address], [place] [address]     Phone:  [phone] - lidocaine  5 % Patch  - polyethylene glycol powder      Some of these will need a paper prescription and others can be bought over the counter. Ask your nurse if you have questions.     Bring a paper prescription for each of these medications    - order for DME  - order for DME  - oxyCODONE-acetaminophen 5-325 MG per tablet             Protect others around you: Learn how to safely use, store and throw away your medicines at www.disposemymeds.org.             Medication List: This is a list of all your medications and when to take them. Check marks below indicate your daily home schedule. Keep this list as a reference.      Medications           Morning Afternoon Evening Bedtime As Needed    * BUSPIRONE HCL PO   Take 5 mg by mouth every morning   Last time this was given:  5 mg on 1/13/2017  8:22 AM   Next Dose Due:  1/14/17 a.m.                                   * BUSPIRONE HCL PO   Take 10 mg by mouth every evening   Last time this was given:  5 mg on 1/13/2017  8:22 AM   Next Dose Due:  Tonight 1/13/17                                   ibuprofen 200 MG tablet   Commonly known as:  ADVIL/MOTRIN   Take 3 tablets (600 mg) by mouth every 6 hours as needed for other (Headache)                                   lidocaine 5 % Patch   Commonly known as:  LIDODERM   Place 1 patch onto the skin every 24 hours May substitute over the counter 4% lidocaine patches instead. Place on abdomen for incisional pain.   Last time this was given:  1 patch on 1/13/2017  8:25 AM                                ondansetron 4 MG tablet   Commonly known as:  ZOFRAN   Take 1 tablet (4 mg) by mouth every 6 hours                                   * order for DME   Equipment being ordered: Other: shower chair Treatment Diagnosis: s/p exploratory laparotomy for rectal foreign body                                * order for DME   Equipment being ordered: Walker Wheels (), Walker () and Bath Seat () Treatment Diagnosis: unstable gait                                 oxyCODONE-acetaminophen 5-325 MG per tablet   Commonly known as:  PERCOCET   Take 1-2 tablets by mouth every 8 hours as needed for pain                                polyethylene glycol powder   Commonly known as:  MIRALAX/GLYCOLAX   Take 17 g by mouth daily as needed for constipation                                PRISTIQ PO   Take 100 mg by mouth daily   Last time this was given:  100 mg on 1/13/2017  8:23 AM   Next Dose Due:  1/14/17 a.m.                                   SUMATRIPTAN SUCCINATE PO   Take 50 mg by mouth once as needed for migraine                                TOPIRAMATE PO   Take 50 mg by mouth in the morning and 100 mg by mouth in the evening   Last time this was given:  50 mg on 1/13/2017  8:23 AM   Next Dose Due:  1/13/17 Tonight   Notes to Patient:  Take 100 mg tonight.                                      TYLENOL PO   Take 1,000 mg by mouth every 6 hours as needed   Last time this was given:  1,000 mg on 1/13/2017  8:22 AM                                   VITAMIN D3 PO   Take 5,000 Units by mouth daily   Next Dose Due:  1/13/17 Take this afternoon when you get home.                                   * Notice:  This list has 4 medication(s) that are the same as other medications prescribed for you. Read the directions carefully, and ask your doctor or other care provider to review them with you.

## 2017-01-10 NOTE — IP AVS SNAPSHOT
UNIT 6C [facility]: [phone]                                              INTERAGENCY TRANSFER FORM - PHYSICIAN ORDERS   1/10/2017                    Hospital Admission Date: 1/10/2017  ABAD TONG   : 1990  Sex: Female        Attending Provider: Annmarie Haynes MD     Allergies:  Ancef, Augmentin, Blood-group Specific Substance, Hydrocodone, Influenza Vaccines, Oseltamivir, Vicodin, Cephalosporins    Infection:  None   Service:  EMERGENCY ME    Ht:  --   Wt:  104.736 kg (230 lb 14.4 oz)   Admission Wt:  105.5 kg (232 lb 9.4 oz)    BMI:  42.22 kg/m 2   BSA:  2.14 m 2            Patient PCP Information     Provider PCP Type    Sandra Ramos MD General      ED Clinical Impression     Diagnosis Description Comment Added By Time Added    Rectal foreign body, initial encounter [T18.5XXA] Rectal foreign body, initial encounter [T18.5XXA]  Estrada Bee MD 1/10/2017  8:19 PM      Hospital Problems as of 2017              Priority Class Noted POA    Rectal foreign body Medium  2017 Yes      Non-Hospital Problems as of 2017              Priority Class Noted    Migraine without aura Medium  Unknown    Depression Medium  Unknown    Borderline personality disorder Medium  Unknown    Anxiety Medium  Unknown    H/O self-harm Medium  Unknown    ADD (attention deficit disorder) Medium  Unknown    PTSD (post-traumatic stress disorder) Medium  Unknown    Morbid obesity (H) Medium  Unknown      Code Status History     Date Active Date Inactive Code Status Order ID Comments User Context    2015  5:36 AM 2015 10:42 AM Full Code [id number]  Thad Iglesias MD Inpatient    2014  6:06 PM 12/15/2014  4:07 PM Full Code [id number]  Liza Muniz RN Inpatient         Medication Review      START taking        Dose / Directions Comments    lidocaine 5 % Patch   Commonly known as:  LIDODERM   Used for:  Rectal foreign body, subsequent encounter         Dose:  1 patch   Place 1 patch onto the skin every 24 hours May substitute over the counter 4% lidocaine patches instead. Place on abdomen for incisional pain.   Quantity:  30 patch   Refills:  3        * order for DME   Used for:  Rectal foreign body, subsequent encounter        Equipment being ordered: Other: shower chair Treatment Diagnosis: s/p exploratory laparotomy for rectal foreign body   Quantity:  1 each   Refills:  0        * order for DME   Used for:  Rectal foreign body, initial encounter        Equipment being ordered: Walker Wheels (), Walker () and Bath Seat () Treatment Diagnosis: unstable gait   Quantity:  1 Units   Refills:  0        oxyCODONE-acetaminophen 5-325 MG per tablet   Commonly known as:  PERCOCET   Used for:  Acute post-operative pain        Dose:  1-2 tablet   Take 1-2 tablets by mouth every 8 hours as needed for pain   Quantity:  30 tablet   Refills:  0        * Notice:  This list has 2 medication(s) that are the same as other medications prescribed for you. Read the directions carefully, and ask your doctor or other care provider to review them with you.      CONTINUE these medications which may have CHANGED, or have new prescriptions. If we are uncertain of the size of tablets/capsules you have at home, strength may be listed as something that might have changed.        Dose / Directions Comments    polyethylene glycol powder   Commonly known as:  MIRALAX/GLYCOLAX   This may have changed:    - when to take this  - reasons to take this   Used for:  Rectal foreign body, subsequent encounter        Dose:  17 g   Take 17 g by mouth daily as needed for constipation   Quantity:  510 g   Refills:  3          CONTINUE these medications which have NOT CHANGED        Dose / Directions Comments    * BUSPIRONE HCL PO        Dose:  5 mg   Take 5 mg by mouth every morning   Refills:  0        * BUSPIRONE HCL PO        Dose:  10 mg   Take 10 mg by mouth every evening   Refills:  0         ibuprofen 200 MG tablet   Commonly known as:  ADVIL/MOTRIN        Dose:  600 mg   Take 3 tablets (600 mg) by mouth every 6 hours as needed for other (Headache)   Quantity:  100 tablet   Refills:  0        ondansetron 4 MG tablet   Commonly known as:  ZOFRAN        Dose:  4 mg   Take 1 tablet (4 mg) by mouth every 6 hours   Quantity:  8 tablet   Refills:  0        PRISTIQ PO        Dose:  100 mg   Take 100 mg by mouth daily   Refills:  0        SUMATRIPTAN SUCCINATE PO        Dose:  50 mg   Take 50 mg by mouth once as needed for migraine   Refills:  0        TOPIRAMATE PO   Notes to Patient:  Take 100 mg tonight.        Take 50 mg by mouth in the morning and 100 mg by mouth in the evening   Refills:  0        TYLENOL PO        Dose:  1000 mg   Take 1,000 mg by mouth every 6 hours as needed   Refills:  0        VITAMIN D3 PO        Dose:  5000 Units   Take 5,000 Units by mouth daily   Refills:  0        * Notice:  This list has 2 medication(s) that are the same as other medications prescribed for you. Read the directions carefully, and ask your doctor or other care provider to review them with you.            Summary of Visit     Reason for your hospital stay       Rectal foreign body, prophylactic treatment for gonorrhea and chlamydia             After Care     Activity       See discharge instructions.       Diet       Regular diet       Discharge Instructions       DIET  -You may eat a regular diet. We recommend high protein, lean foods to help with healing.  -We recommend eating slowly, chewing thoroughly, eating small frequent meals throughout the day  -Stay well hydrated    ACTIVITY  -No lifting, pushing, pulling greater than 10 lbs and no strenuous exercise for 6 weeks   -You may shower, but do not soak in tub or pool  -No driving while on narcotic analgesics (i.e. Percocet, oxycodone, Vicodin)  -No driving until you are able to fully twist to both sides quickly and without any pain    WOUND  CLINIC  -Inspect your wounds daily for signs of infection (increased redness, drainage, pain)  -Keep your wound clean and dry  -You have dissolvable stitches so do not need to have anything removed like staples.    NOTIFY  Please contact Ivonne Chávez RN or Edwige Powell RN at [phone] for problems after discharge such as:  -Temperature > 101F, chills, rigors, dizziness  -Redness around or purulent drainage from wound  -Inability to tolerate diet, nausea or vomiting  -You stop passing gas, develop significant bloating, abdominal pain  -Have blood in stools/vomit  -Have severe diarrhea/constipation  -Any other questions or concerns.  -At nights (after 4:30pm), on weekends, or if urgent, call [phone] and ask the  to speak with the on-call Colorectal Surgery resident or fellow    Medication Instructions  Some of your medications may have changed. Please take only prescribed and resumed medications. We recommend taking Miralax daily to keep stools soft.             Referrals     Home care nursing referral       [facility]   Phone: [phone]  Fax: [phone]    For RN eval post hospitalization.   Assess vital signs, respiratory and cardiac status, activity tolerance, hydration, nutritional status.  HHA to assist with showering 3 times per week.     Your provider has ordered home care nursing services. If you have not been contacted within 2 days of your discharge please call the inpatient department phone number at [phone] .             Follow-Up Appointment Instructions     Future Labs/Procedures    Adult [facility] Follow-up and recommended labs and tests     Comments:    1.  You will need to follow-up with Tea Ramon NP in the Colon and Rectal Surgery clinic in 1 week(s) and Dr. Haynes in 2-3 week(s).  Please contact our clinic scheduler Gretel Castaneda or Marybeth Lafleur (phone # [phone]) if you have not heard from our clinic in 3 business days afer discharge to  schedule a follow-up appointment.  2.  Follow up with your primary care provider in 1 week after discharge from the hospital to review this hospitalization. You should discuss if your vaginal discharge has improved, as well as discuss generally how you are doing since leaving the hospital.  3.  Follow up with your therapist in 1 week to discuss how you are doing since leaving the hospital. We recommend you continue to discuss coping mechanisms for stress and how to navigate intimate relationships with consent.    Appointments on [facility] and/or [facility] (with [facility] or [facility] provider or service). Call [phone] if you haven't heard regarding these appointments within 7 days of discharge.      Follow-Up Appointment Instructions     Adult [facility] Follow-up and recommended labs and tests       1.  You will need to follow-up with Tea Ramon NP in the Colon and Rectal Surgery clinic in 1 week(s) and Dr. Haynes in 2-3 week(s).  Please contact our clinic scheduler Gretel Castaneda or Marybeth Lafleur (phone # [phone]) if you have not heard from our clinic in 3 business days afer discharge to schedule a follow-up appointment.  2.  Follow up with your primary care provider in 1 week after discharge from the hospital to review this hospitalization. You should discuss if your vaginal discharge has improved, as well as discuss generally how you are doing since leaving the hospital.  3.  Follow up with your therapist in 1 week to discuss how you are doing since leaving the hospital. We recommend you continue to discuss coping mechanisms for stress and how to navigate intimate relationships with consent.    Appointments on [facility]/or [facility] (with [facility] or [facility] provider or service). Call [phone] if you haven't heard regarding these appointments within 7 days of discharge.             Statement of Approval     Ordered          01/13/17 1404  I have reviewed and agree with all the  recommendations and orders detailed in this document.   EFFECTIVE NOW     Approved and electronically signed by:  Martha Talbot MD

## 2017-01-11 PROBLEM — T18.5XXA RECTAL FOREIGN BODY: Status: ACTIVE | Noted: 2017-01-11

## 2017-01-11 LAB
ABO + RH BLD: ABNORMAL
ABO + RH BLD: ABNORMAL
ANION GAP SERPL CALCULATED.3IONS-SCNC: 10 MMOL/L (ref 3–14)
BLD GP AB INVEST PLASRBC-IMP: ABNORMAL
BLD GP AB SCN SERPL QL ELUTION: ABNORMAL
BLD GP AB SCN SERPL QL: ABNORMAL
BLOOD BANK CMNT PATIENT-IMP: ABNORMAL
BUN SERPL-MCNC: 14 MG/DL (ref 7–30)
CALCIUM SERPL-MCNC: 7.8 MG/DL (ref 8.5–10.1)
CHLORIDE SERPL-SCNC: 112 MMOL/L (ref 94–109)
CO2 SERPL-SCNC: 20 MMOL/L (ref 20–32)
CREAT SERPL-MCNC: 0.6 MG/DL (ref 0.52–1.04)
CREAT SERPL-MCNC: 0.67 MG/DL (ref 0.52–1.04)
DAT C3-SP REAG RBC QL: ABNORMAL
DAT IGG-SP REAG RBC-IMP: ABNORMAL
DAT IGG-SP REAG RBC-IMP: ABNORMAL
DAT POLY-SP REAG RBC QL: ABNORMAL
ERYTHROCYTE [DISTWIDTH] IN BLOOD BY AUTOMATED COUNT: 14.1 % (ref 10–15)
GFR SERPL CREATININE-BSD FRML MDRD: ABNORMAL ML/MIN/1.7M2
GFR SERPL CREATININE-BSD FRML MDRD: NORMAL ML/MIN/1.7M2
GLUCOSE SERPL-MCNC: 99 MG/DL (ref 70–99)
HCG UR QL: NEGATIVE
HCT VFR BLD AUTO: 35.2 % (ref 35–47)
HGB BLD-MCNC: 11.2 G/DL (ref 11.7–15.7)
MAGNESIUM SERPL-MCNC: 1.8 MG/DL (ref 1.6–2.3)
MCH RBC QN AUTO: 29.1 PG (ref 26.5–33)
MCHC RBC AUTO-ENTMCNC: 31.8 G/DL (ref 31.5–36.5)
MCV RBC AUTO: 91 FL (ref 78–100)
PHOSPHATE SERPL-MCNC: 3 MG/DL (ref 2.5–4.5)
PLATELET # BLD AUTO: 216 10E9/L (ref 150–450)
PLATELET # BLD AUTO: 218 10E9/L (ref 150–450)
POTASSIUM SERPL-SCNC: 3.6 MMOL/L (ref 3.4–5.3)
RBC # BLD AUTO: 3.85 10E12/L (ref 3.8–5.2)
SODIUM SERPL-SCNC: 143 MMOL/L (ref 133–144)
SPECIMEN EXP DATE BLD: ABNORMAL
WBC # BLD AUTO: 11.9 10E9/L (ref 4–11)

## 2017-01-11 PROCEDURE — 99222 1ST HOSP IP/OBS MODERATE 55: CPT

## 2017-01-11 PROCEDURE — 36415 COLL VENOUS BLD VENIPUNCTURE: CPT | Performed by: SURGERY

## 2017-01-11 PROCEDURE — 85027 COMPLETE CBC AUTOMATED: CPT | Performed by: SURGERY

## 2017-01-11 PROCEDURE — 36415 COLL VENOUS BLD VENIPUNCTURE: CPT | Performed by: COLON & RECTAL SURGERY

## 2017-01-11 PROCEDURE — 86780 TREPONEMA PALLIDUM: CPT | Performed by: COLON & RECTAL SURGERY

## 2017-01-11 PROCEDURE — 25000132 ZZH RX MED GY IP 250 OP 250 PS 637: Performed by: COLON & RECTAL SURGERY

## 2017-01-11 PROCEDURE — 25000132 ZZH RX MED GY IP 250 OP 250 PS 637: Performed by: SURGERY

## 2017-01-11 PROCEDURE — 25800025 ZZH RX 258: Performed by: SURGERY

## 2017-01-11 PROCEDURE — 81025 URINE PREGNANCY TEST: CPT | Performed by: SURGERY

## 2017-01-11 PROCEDURE — 25000125 ZZHC RX 250: Performed by: EMERGENCY MEDICINE

## 2017-01-11 PROCEDURE — 25800025 ZZH RX 258: Performed by: ANESTHESIOLOGY

## 2017-01-11 PROCEDURE — 25000125 ZZHC RX 250: Performed by: ANESTHESIOLOGY

## 2017-01-11 PROCEDURE — 25000125 ZZHC RX 250: Performed by: SURGERY

## 2017-01-11 PROCEDURE — 84100 ASSAY OF PHOSPHORUS: CPT | Performed by: SURGERY

## 2017-01-11 PROCEDURE — 80048 BASIC METABOLIC PNL TOTAL CA: CPT | Performed by: SURGERY

## 2017-01-11 PROCEDURE — 21400003 ZZH R&B CCU CRITICAL UMMC

## 2017-01-11 PROCEDURE — 93005 ELECTROCARDIOGRAM TRACING: CPT

## 2017-01-11 PROCEDURE — 87389 HIV-1 AG W/HIV-1&-2 AB AG IA: CPT | Performed by: COLON & RECTAL SURGERY

## 2017-01-11 PROCEDURE — 83735 ASSAY OF MAGNESIUM: CPT | Performed by: SURGERY

## 2017-01-11 RX ORDER — SODIUM CHLORIDE, SODIUM LACTATE, POTASSIUM CHLORIDE, CALCIUM CHLORIDE 600; 310; 30; 20 MG/100ML; MG/100ML; MG/100ML; MG/100ML
INJECTION, SOLUTION INTRAVENOUS CONTINUOUS
Status: DISCONTINUED | OUTPATIENT
Start: 2017-01-11 | End: 2017-01-11

## 2017-01-11 RX ORDER — POLYETHYLENE GLYCOL 3350 17 G/17G
17 POWDER, FOR SOLUTION ORAL DAILY
Status: ON HOLD | COMMUNITY
End: 2017-01-13

## 2017-01-11 RX ORDER — LIDOCAINE 50 MG/G
1 PATCH TOPICAL
Status: DISCONTINUED | OUTPATIENT
Start: 2017-01-11 | End: 2017-01-13 | Stop reason: HOSPADM

## 2017-01-11 RX ORDER — SODIUM CHLORIDE, SODIUM LACTATE, POTASSIUM CHLORIDE, CALCIUM CHLORIDE 600; 310; 30; 20 MG/100ML; MG/100ML; MG/100ML; MG/100ML
INJECTION, SOLUTION INTRAVENOUS CONTINUOUS
Status: DISCONTINUED | OUTPATIENT
Start: 2017-01-11 | End: 2017-01-13

## 2017-01-11 RX ORDER — HYDROXYZINE HYDROCHLORIDE 50 MG/1
50 TABLET, FILM COATED ORAL EVERY 6 HOURS PRN
Status: DISCONTINUED | OUTPATIENT
Start: 2017-01-11 | End: 2017-01-13 | Stop reason: HOSPADM

## 2017-01-11 RX ORDER — NALOXONE HYDROCHLORIDE 0.4 MG/ML
.1-.4 INJECTION, SOLUTION INTRAMUSCULAR; INTRAVENOUS; SUBCUTANEOUS
Status: DISCONTINUED | OUTPATIENT
Start: 2017-01-11 | End: 2017-01-13 | Stop reason: HOSPADM

## 2017-01-11 RX ORDER — METOCLOPRAMIDE HYDROCHLORIDE 5 MG/ML
5 INJECTION INTRAMUSCULAR; INTRAVENOUS EVERY 6 HOURS
Status: DISCONTINUED | OUTPATIENT
Start: 2017-01-11 | End: 2017-01-11

## 2017-01-11 RX ORDER — ONDANSETRON 2 MG/ML
4 INJECTION INTRAMUSCULAR; INTRAVENOUS EVERY 30 MIN PRN
Status: DISCONTINUED | OUTPATIENT
Start: 2017-01-11 | End: 2017-01-11 | Stop reason: HOSPADM

## 2017-01-11 RX ORDER — ONDANSETRON 4 MG/1
4 TABLET, ORALLY DISINTEGRATING ORAL EVERY 30 MIN PRN
Status: DISCONTINUED | OUTPATIENT
Start: 2017-01-11 | End: 2017-01-11 | Stop reason: HOSPADM

## 2017-01-11 RX ORDER — ONDANSETRON 2 MG/ML
4 INJECTION INTRAMUSCULAR; INTRAVENOUS EVERY 6 HOURS PRN
Status: DISCONTINUED | OUTPATIENT
Start: 2017-01-11 | End: 2017-01-13 | Stop reason: HOSPADM

## 2017-01-11 RX ORDER — HYDROMORPHONE HYDROCHLORIDE 1 MG/ML
.3-.5 INJECTION, SOLUTION INTRAMUSCULAR; INTRAVENOUS; SUBCUTANEOUS EVERY 10 MIN PRN
Status: DISCONTINUED | OUTPATIENT
Start: 2017-01-11 | End: 2017-01-11 | Stop reason: HOSPADM

## 2017-01-11 RX ORDER — CARBOXYMETHYLCELLULOSE SODIUM 5 MG/ML
1 SOLUTION/ DROPS OPHTHALMIC 3 TIMES DAILY PRN
Status: DISCONTINUED | OUTPATIENT
Start: 2017-01-11 | End: 2017-01-13 | Stop reason: HOSPADM

## 2017-01-11 RX ORDER — TOPIRAMATE 25 MG/1
100 TABLET, FILM COATED ORAL AT BEDTIME
Status: DISCONTINUED | OUTPATIENT
Start: 2017-01-11 | End: 2017-01-13 | Stop reason: HOSPADM

## 2017-01-11 RX ORDER — FENTANYL CITRATE 50 UG/ML
25-50 INJECTION, SOLUTION INTRAMUSCULAR; INTRAVENOUS
Status: DISCONTINUED | OUTPATIENT
Start: 2017-01-11 | End: 2017-01-11 | Stop reason: HOSPADM

## 2017-01-11 RX ORDER — BUSPIRONE HYDROCHLORIDE 5 MG/1
5 TABLET ORAL EVERY MORNING
Status: DISCONTINUED | OUTPATIENT
Start: 2017-01-12 | End: 2017-01-13 | Stop reason: HOSPADM

## 2017-01-11 RX ORDER — MEPERIDINE HYDROCHLORIDE 25 MG/ML
12.5 INJECTION INTRAMUSCULAR; INTRAVENOUS; SUBCUTANEOUS
Status: DISCONTINUED | OUTPATIENT
Start: 2017-01-11 | End: 2017-01-11 | Stop reason: HOSPADM

## 2017-01-11 RX ORDER — TOPIRAMATE 25 MG/1
50 TABLET, FILM COATED ORAL EVERY MORNING
Status: DISCONTINUED | OUTPATIENT
Start: 2017-01-12 | End: 2017-01-11

## 2017-01-11 RX ORDER — TOPIRAMATE 25 MG/1
50 TABLET, FILM COATED ORAL EVERY MORNING
Status: DISCONTINUED | OUTPATIENT
Start: 2017-01-12 | End: 2017-01-13 | Stop reason: HOSPADM

## 2017-01-11 RX ORDER — SODIUM CHLORIDE, SODIUM LACTATE, POTASSIUM CHLORIDE, CALCIUM CHLORIDE 600; 310; 30; 20 MG/100ML; MG/100ML; MG/100ML; MG/100ML
INJECTION, SOLUTION INTRAVENOUS CONTINUOUS
Status: DISCONTINUED | OUTPATIENT
Start: 2017-01-11 | End: 2017-01-11 | Stop reason: HOSPADM

## 2017-01-11 RX ORDER — METOCLOPRAMIDE HYDROCHLORIDE 5 MG/ML
5 INJECTION INTRAMUSCULAR; INTRAVENOUS EVERY 6 HOURS PRN
Status: DISCONTINUED | OUTPATIENT
Start: 2017-01-11 | End: 2017-01-13 | Stop reason: HOSPADM

## 2017-01-11 RX ORDER — AZITHROMYCIN 250 MG/1
1000 TABLET, FILM COATED ORAL ONCE
Status: COMPLETED | OUTPATIENT
Start: 2017-01-11 | End: 2017-01-11

## 2017-01-11 RX ORDER — BUSPIRONE HYDROCHLORIDE 10 MG/1
10 TABLET ORAL EVERY EVENING
Status: DISCONTINUED | OUTPATIENT
Start: 2017-01-11 | End: 2017-01-13 | Stop reason: HOSPADM

## 2017-01-11 RX ORDER — NALOXONE HYDROCHLORIDE 0.4 MG/ML
.1-.4 INJECTION, SOLUTION INTRAMUSCULAR; INTRAVENOUS; SUBCUTANEOUS
Status: DISCONTINUED | OUTPATIENT
Start: 2017-01-11 | End: 2017-01-11 | Stop reason: HOSPADM

## 2017-01-11 RX ORDER — DESVENLAFAXINE 50 MG/1
100 TABLET, FILM COATED, EXTENDED RELEASE ORAL DAILY
Status: DISCONTINUED | OUTPATIENT
Start: 2017-01-11 | End: 2017-01-13 | Stop reason: HOSPADM

## 2017-01-11 RX ORDER — HYDROXYZINE HYDROCHLORIDE 25 MG/1
25 TABLET, FILM COATED ORAL EVERY 6 HOURS PRN
Status: DISCONTINUED | OUTPATIENT
Start: 2017-01-11 | End: 2017-01-13 | Stop reason: HOSPADM

## 2017-01-11 RX ORDER — ACETAMINOPHEN 10 MG/ML
1000 INJECTION, SOLUTION INTRAVENOUS EVERY 6 HOURS
Status: DISCONTINUED | OUTPATIENT
Start: 2017-01-11 | End: 2017-01-12

## 2017-01-11 RX ORDER — TOPIRAMATE 50 MG/1
50 TABLET, FILM COATED ORAL 2 TIMES DAILY
Status: DISCONTINUED | OUTPATIENT
Start: 2017-01-11 | End: 2017-01-11

## 2017-01-11 RX ORDER — LIDOCAINE 40 MG/G
CREAM TOPICAL
Status: DISCONTINUED | OUTPATIENT
Start: 2017-01-11 | End: 2017-01-13 | Stop reason: HOSPADM

## 2017-01-11 RX ADMIN — CARBOXYMETHYLCELLULOSE SODIUM 1 DROP: 5 SOLUTION/ DROPS OPHTHALMIC at 11:30

## 2017-01-11 RX ADMIN — SODIUM CHLORIDE, POTASSIUM CHLORIDE, SODIUM LACTATE AND CALCIUM CHLORIDE: 600; 310; 30; 20 INJECTION, SOLUTION INTRAVENOUS at 00:25

## 2017-01-11 RX ADMIN — HYDROMORPHONE HYDROCHLORIDE 0.5 MG: 10 INJECTION, SOLUTION INTRAMUSCULAR; INTRAVENOUS; SUBCUTANEOUS at 01:10

## 2017-01-11 RX ADMIN — HYDROMORPHONE HYDROCHLORIDE 0.5 MG: 10 INJECTION, SOLUTION INTRAMUSCULAR; INTRAVENOUS; SUBCUTANEOUS at 01:53

## 2017-01-11 RX ADMIN — ENOXAPARIN SODIUM 40 MG: 40 INJECTION SUBCUTANEOUS at 18:22

## 2017-01-11 RX ADMIN — PANTOPRAZOLE SODIUM 40 MG: 40 INJECTION, POWDER, FOR SOLUTION INTRAVENOUS at 04:57

## 2017-01-11 RX ADMIN — FENTANYL CITRATE 50 MCG: 50 INJECTION, SOLUTION INTRAMUSCULAR; INTRAVENOUS at 00:37

## 2017-01-11 RX ADMIN — Medication: at 21:35

## 2017-01-11 RX ADMIN — TOPIRAMATE 50 MG: 50 TABLET, FILM COATED ORAL at 09:00

## 2017-01-11 RX ADMIN — Medication 1 LOZENGE: at 18:28

## 2017-01-11 RX ADMIN — HYDROMORPHONE HYDROCHLORIDE 0.5 MG: 10 INJECTION, SOLUTION INTRAMUSCULAR; INTRAVENOUS; SUBCUTANEOUS at 01:28

## 2017-01-11 RX ADMIN — ACETAMINOPHEN 1000 MG: 10 INJECTION, SOLUTION INTRAVENOUS at 14:51

## 2017-01-11 RX ADMIN — BUSPIRONE HYDROCHLORIDE 10 MG: 10 TABLET ORAL at 20:35

## 2017-01-11 RX ADMIN — FENTANYL CITRATE 50 MCG: 50 INJECTION, SOLUTION INTRAMUSCULAR; INTRAVENOUS at 02:02

## 2017-01-11 RX ADMIN — SODIUM CHLORIDE, POTASSIUM CHLORIDE, SODIUM LACTATE AND CALCIUM CHLORIDE: 600; 310; 30; 20 INJECTION, SOLUTION INTRAVENOUS at 08:46

## 2017-01-11 RX ADMIN — DESVENLAFAXINE SUCCINATE 100 MG: 50 TABLET, EXTENDED RELEASE ORAL at 09:00

## 2017-01-11 RX ADMIN — TOPIRAMATE 100 MG: 25 TABLET ORAL at 20:42

## 2017-01-11 RX ADMIN — AZITHROMYCIN 1000 MG: 250 TABLET, FILM COATED ORAL at 18:22

## 2017-01-11 RX ADMIN — ONDANSETRON 4 MG: 2 INJECTION INTRAMUSCULAR; INTRAVENOUS at 00:42

## 2017-01-11 RX ADMIN — ONDANSETRON 4 MG: 2 INJECTION INTRAMUSCULAR; INTRAVENOUS at 07:47

## 2017-01-11 RX ADMIN — LIDOCAINE 1 PATCH: 50 PATCH TOPICAL at 14:51

## 2017-01-11 RX ADMIN — ONDANSETRON 4 MG: 2 INJECTION INTRAMUSCULAR; INTRAVENOUS at 02:46

## 2017-01-11 RX ADMIN — FENTANYL CITRATE 50 MCG: 50 INJECTION, SOLUTION INTRAMUSCULAR; INTRAVENOUS at 01:38

## 2017-01-11 RX ADMIN — METOCLOPRAMIDE 5 MG: 5 INJECTION, SOLUTION INTRAMUSCULAR; INTRAVENOUS at 15:06

## 2017-01-11 RX ADMIN — Medication: at 00:35

## 2017-01-11 RX ADMIN — HYDROXYZINE HYDROCHLORIDE 25 MG: 25 TABLET ORAL at 20:34

## 2017-01-11 RX ADMIN — HYDROMORPHONE HYDROCHLORIDE 0.5 MG: 10 INJECTION, SOLUTION INTRAMUSCULAR; INTRAVENOUS; SUBCUTANEOUS at 02:46

## 2017-01-11 RX ADMIN — ACETAMINOPHEN 1000 MG: 10 INJECTION, SOLUTION INTRAVENOUS at 01:40

## 2017-01-11 RX ADMIN — METOCLOPRAMIDE 5 MG: 5 INJECTION, SOLUTION INTRAMUSCULAR; INTRAVENOUS at 20:41

## 2017-01-11 RX ADMIN — FENTANYL CITRATE 50 MCG: 50 INJECTION, SOLUTION INTRAMUSCULAR; INTRAVENOUS at 00:55

## 2017-01-11 RX ADMIN — SODIUM CHLORIDE, POTASSIUM CHLORIDE, SODIUM LACTATE AND CALCIUM CHLORIDE: 600; 310; 30; 20 INJECTION, SOLUTION INTRAVENOUS at 01:30

## 2017-01-11 RX ADMIN — ACETAMINOPHEN 1000 MG: 10 INJECTION, SOLUTION INTRAVENOUS at 20:32

## 2017-01-11 RX ADMIN — ACETAMINOPHEN 1000 MG: 10 INJECTION, SOLUTION INTRAVENOUS at 08:47

## 2017-01-11 ASSESSMENT — PAIN DESCRIPTION - DESCRIPTORS
DESCRIPTORS: BURNING;STABBING
DESCRIPTORS: STABBING
DESCRIPTORS: SHARP

## 2017-01-11 NOTE — OR NURSING
Pt is much calmer. Pain is under control. Pt reports she still has pain that has moved from the right abdomen to the left side. Pain is sharp at times, especially when moves or coughs. BBS are clear. Vs are stable with some sinus tachycardia. Pt is oriented x4. Report called to 6C RN.
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
## 2017-01-11 NOTE — PLAN OF CARE
Problem: Goal Outcome Summary  Goal: Goal Outcome Summary  D: Rectal foreign body    I: Monitored vitals and assessed pt status.     Running: LR at 150 ml/hr.      A:  Pt arrived from the PACU at 0330. VSS, on 2 Liters NC..Afebrile.  A&Ox4. Pain controlled with PCA and application of cold.  Midline incision covered, drsg c/d/i. Instructed on use of IS. Ice chips only per orders. PIV infusing. Social work consult ordered. Question if pt could be classified as a vulnerable adult,  I/O this shift:  In: 1127.5 [I.V.:1027.5; IV Piggyback:100]  Out: 600 [Urine:600]    Temp:  [97.4  F (36.3  C)-99.2  F (37.3  C)] 99.2  F (37.3  C)  Pulse:  [96] 96  Heart Rate:  [] 99  Resp:  [12-18] 14  BP: (108-141)/(53-93) 111/56 mmHg  SpO2:  [95 %-100 %] 98 %      P: Continue to monitor Pt status and report changes to treatment team.

## 2017-01-11 NOTE — PROGRESS NOTES
Kayla, , from [employer] called.  Kayla explained the information that she had received from Nevin.  RN explained Nevin's stay at the hospital, including surgery and current recovery time.  RN gave Kayla the 's pager number, in order to ask further questions and verify details.
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
## 2017-01-11 NOTE — PHARMACY-ADMISSION MEDICATION HISTORY
Admission medication history interview status for the 1/10/2017 admission is complete. See Epic admission navigator for allergy information, pharmacy, prior to admission medications and immunization status.     Medication history interview sources:  Patient    Changes made to PTA medication list (reason)  Added: Buspirone  Deleted: Ondansetron (duplicate)  Changed: Topiramate (clarified), Miralax and Sumatriptan (added PRN)    Additional medication history information (including reliability of information, actions taken by pharmacist):  Patients was very well informed of medications, and this list was generated solely by her.       Prior to Admission medications    Medication Sig Last Dose Taking? Auth Provider   polyethylene glycol (MIRALAX/GLYCOLAX) powder Take 17 g by mouth daily Past Month at Unknown time Yes Unknown, Entered By History   TOPIRAMATE PO Take 50 mg by mouth in the morning and 100 mg by mouth in the evening 1/10/2017 at 0700 Yes Unknown, Entered By History   BUSPIRONE HCL PO Take 5 mg by mouth in the morning and 10 mg by mouth in the evening 1/10/2017 at 0700 Yes Unknown, Entered By History   ibuprofen (ADVIL/MOTRIN) 200 MG tablet Take 3 tablets (600 mg) by mouth every 6 hours as needed for other (Headache) Past Month at Unknown time Yes Sandra Ramos MD   ondansetron (ZOFRAN) 4 MG tablet Take 1 tablet (4 mg) by mouth every 6 hours Past Month at Unknown time Yes Ilya Snowden MD   Desvenlafaxine Succinate (PRISTIQ PO) Take 100 mg by mouth daily 1/10/2017 at 0700 Yes Reported, Patient   Acetaminophen (TYLENOL PO) Take 1,000 mg by mouth every 6 hours as needed  Past Week at Unknown time Yes Reported, Patient   Cholecalciferol (VITAMIN D3 PO) Take 5,000 Units by mouth daily  1/10/2017 at 0700 Yes Reported, Patient   SUMATRIPTAN SUCCINATE PO Take 50 mg by mouth once as needed for migraine More than a month at Unknown time  Unknown, Entered By History         Medication history completed by:  Efraín Sanchez, PharmD Student    I attest that the information provided in this note by the pharmacy student is complete and accurate    Gokul Suárez, PharmD  PGY-1 Resident Pharmacist

## 2017-01-11 NOTE — ED NOTES
Bed: ED18  Expected date: 1/10/17  Expected time: 6:10 PM  Means of arrival: Ambulance  Comments:  darcy 2--26 female abd pain
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
## 2017-01-11 NOTE — OR NURSING
Pt to PACU from the OR. Pt awake but groggy. Pt oriented x4. Pt crying continuously and complaining of severe, sharp, and tearing abdominal pain. Pt getting narcotics for pain which at this time is starting to decrease Pt's pain a little. Clara Lopez - CARMEN also helping to care for Pt and is near Pt's bed to assist.
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
## 2017-01-11 NOTE — PROGRESS NOTES
Brief Social Work Note    Adult protection report completed.  Report #[id number].    DYLAN Limon, APSW  6C Unit   Pager: [phone]  Phone: [phone]

## 2017-01-11 NOTE — PROGRESS NOTES
"Brief Social Work Note    SW received a call from Francisca that she had attempted to meet with pt and the pt stated she was \"too tired\" to talk with advocate.  Francisca left contact information with the pt if she needed support later on today.  Francisca will follow if needed/requested by pt.     DYLAN Limon, APSW  6C Unit   Pager: [phone]  Phone: [phone]        "
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
## 2017-01-11 NOTE — ANESTHESIA PREPROCEDURE EVALUATION
Anesthesia Evaluation     . Pt has had prior anesthetic. Type: General    No history of anesthetic complications     ROS/MED HX    ENT/Pulmonary:  - neg pulmonary ROS     Neurologic:     (+)migraines,     Cardiovascular:  - neg cardiovascular ROS       METS/Exercise Tolerance:  >4 METS   Hematologic:  - neg hematologic  ROS       Musculoskeletal:  - neg musculoskeletal ROS       GI/Hepatic:  - neg GI/hepatic ROS       Renal/Genitourinary:  - ROS Renal section negative       Endo:     (+) Obesity, .      Psychiatric:     (+) psychiatric history depression, other (comment) and anxiety (Borderline personality disorder, h/o self harm, ADD, PTSD)      Infectious Disease:  - neg infectious disease ROS       Malignancy:      - no malignancy   Other:    (+) Possibly pregnant C-spine cleared: N/A, no H/O Chronic Pain,no other significant disability   - neg other ROS         Procedure: Procedure(s):     - Wound Class: I-Clean    PMH/PSH:  Past Medical History   Diagnosis Date     Migraine without aura      no known triggers; on Topamax bid and Imitrex PRN     Depression      Borderline personality disorder      Anxiety      H/O self-harm      ADD (attention deficit disorder)      PTSD (post-traumatic stress disorder)      Anorexia nervosa with bulimia      history of; on Topamax     Morbid obesity (H)        Past Surgical History   Procedure Laterality Date     Mammoplasty reduction Bilateral      Release carpal tunnel Bilateral      Knee surgery Right      removed a small tissue mass from knee     Head & neck surgery  age 7     lymph nodes removed from neck; benign     Laparoscopic ablation endometriosis       Hc remove fecal impaction or fb w anesthesia N/A 12/18/2016     Procedure: REMOVE FECAL IMPACTION/FOREIGN BODY UNDER ANESTHESIA;  Surgeon: Soham Cano MD;  Location:  OR         No current facility-administered medications on file prior to encounter.  Current Outpatient Prescriptions on File Prior to  Encounter:  ibuprofen (ADVIL/MOTRIN) 200 MG tablet Take 3 tablets (600 mg) by mouth every 6 hours as needed for other (Headache)   ondansetron (ZOFRAN) 4 MG tablet Take 1 tablet (4 mg) by mouth every 6 hours   Desvenlafaxine Succinate (PRISTIQ PO) Take 100 mg by mouth daily   polyethylene glycol (MIRALAX/GLYCOLAX) powder Take 17 g by mouth daily   ONDANSETRON PO Take 4 mg by mouth    SUMAtriptan Succinate (IMITREX PO) Take 50 mg by mouth   Acetaminophen (TYLENOL PO) Take 1,000 mg by mouth every 6 hours as needed    Cholecalciferol (VITAMIN D3 PO) Take 5,000 Units by mouth daily    Topiramate (TOPAMAX PO) Take 50 mg by mouth 2 times daily Take topiramate 100 mg at bedtime; take topiramate 50 mg every morning       SH:   Social History   Substance Use Topics     Smoking status: Never Smoker      Smokeless tobacco: Never Used     Alcohol Use: No       Allergies:   Allergies   Allergen Reactions     Ancef [Cefazolin Sodium]      Augmentin Swelling     Blood-Group Specific Substance Other (See Comments)     Patient has an anti-Cw and non-specific antibodies. Blood product orders may be delayed. Draw one red top and two purple top tubes for all type/screen/crossmatch orders.     Hydrocodone Nausea and Vomiting     vomiting for days     Influenza Vaccines Other (See Comments)     Oseltamivir Hives     med stopped, PN: med stopped     Vicodin [Hydrocodone-Acetaminophen] Nausea and Vomiting     Cephalosporins Rash       NPO Status: Per ASA Guidelines    Labs:    Blood Bank:  ABO        O   9/15/2014  RH       Pos   9/15/2014  AS       Pos   9/15/2014  BMP:  Recent Labs   Lab Test  01/04/17 1952   NA  144   POTASSIUM  3.5   CHLORIDE  114*   CO2  23   BUN  10   CR  0.59   GLC  100*   KELBY  8.7     CBC:   Recent Labs   Lab Test  01/04/17 1952   WBC  9.9   RBC  3.97   HGB  11.6*   HCT  35.5   MCV  89   MCH  29.2   MCHC  32.7   RDW  13.4   PLT  261     Coags:  Recent Labs   Lab Test  01/31/15   2120   INR  1.10   PTT  37          Physical Exam  Normal systems: cardiovascular, pulmonary and dental    Airway   Mallampati: II  TM distance: >3 FB  Neck ROM: full    Dental     Cardiovascular       Pulmonary                     Anesthesia Plan      History & Physical Review  History and physical reviewed and following examination; no interval change.    ASA Status:  2 emergent.        Plan for General and ETT with Intravenous induction. Maintenance will be Inhalation.    PONV prophylaxis:  Ondansetron (or other 5HT-3) and Dexamethasone or Solumedrol  To be discussed with staff.   26F presents for rectal foreign body removal under GA.     - ASA 2E  - GETA with standard ASA monitors, IV induction, balanced anesthetic  - PIVx1  - Antibiotics per surgery  - PONV prophylaxis          Postoperative Care  Postoperative pain management:  IV analgesics and Multi-modal analgesia.  Plan for postoperative opioid use.    Consents  Anesthetic plan, risks, benefits and alternatives discussed with:  Patient.  Use of blood products discussed: Yes.   Use of blood products discussed with Patient.  Consented to blood products.  .        Shira Agosto MD  Anesthesiology resident   [facility]

## 2017-01-11 NOTE — PROGRESS NOTES
"CLINICAL NUTRITION SERVICES - ASSESSMENT NOTE     Nutrition Prescription    RECOMMENDATIONS FOR MDs/PROVIDERS TO ORDER:  1. Recommend diet advancement per team discretion  2. Consider outpatient eating disorder treatment referral as/if appropriate    Malnutrition Status:    Unable to determine due to pt busy during attempts to see    Recommendations already ordered by Registered Dietitian (RD):  None at this time    Future/Additional Recommendations:  If/when diet advances, consider ordering supplements and/or calorie counts as/if appropriate     REASON FOR ASSESSMENT  Nevin Alvarado is a/an 26 year old female assessed by the dietitian for RN Consult - \"Pt states poor intake at home\"    NUTRITION HISTORY  - Obtained info from chart, pt requested RD try to come back later/busy during attempts to see.  Last seen by RD on 12/14/14; at that time pt reported minimal PO intake due to not feeling hungry and food not sounding appealing and also because she wanted to lose weight.  Pt reported at that time that she had a scheduled appointment at [facility] related to eating disorder but that it had been cancelled.  Pt also reported at this time that pt was unsure \"how she can eating nothing and not lose any weight\".   - Per chart PMH includes borderline personality disorder and anorexia nervosa with bulimia.  Presents w/ foreign body in rectum.  Procedures: (1/10) - flexible sigmoidoscopy, rigid sigmoidproctoscopy, exploratory laparotomy, extraction of rectal foreign body    CURRENT NUTRITION ORDERS  Diet: Clear Liquid  Intake/Tolerance: NPO 1/10, adv to clear liquid today. Hadn't eaten anything yet per MD note this morning.    LABS  Labs reviewed    MEDICATIONS  Medications reviewed  - Topamax  - Lactated ringers @ 50 mL/hr    ANTHROPOMETRICS  Height: 157.5 cm (5' 2\")  Most Recent Weight: 105.5 kg (232 lb 9.4 oz)    IBW: 50 kg   BMI: Obesity Grade III BMI >40  Weight History: Wt trending up over the past ~9 months.  Per " OSH wt hx pt weighed 102.5 kg on 1/7/17.  Wt Readings from Last 10 Encounters:   01/11/17 105.5 kg (232 lb 9.4 oz)   01/09/17 103.148 kg (227 lb 6.4 oz)   12/26/16 103.42 kg (228 lb)   12/18/16 103.42 kg (228 lb)   04/13/16 95.709 kg (211 lb)   02/07/15 89.812 kg (198 lb)   02/01/15 90.9 kg (200 lb 6.4 oz)   01/28/15 88.451 kg (195 lb)   01/21/15 89.812 kg (198 lb)   12/15/14 88.451 kg (195 lb)      Dosing Weight: 63 kg (adjusted based on lowest recent wt of 102.5 kg on 1/7/17)    ASSESSED NUTRITION NEEDS  Estimated Energy Needs: 1875-0979 kcals/day (20 - 25 kcals/kg)  Justification: Obese  Estimated Protein Needs: 50-63 grams protein/day (0.8 - 1 grams of pro/kg)  Justification: Maintenance  Estimated Fluid Needs: 1 mL/kcal or per provider    PHYSICAL FINDINGS  See malnutrition section below.    MALNUTRITION  % Intake: Unable to assess  % Weight Loss: Unable to assess  Subcutaneous Fat Loss: Unable to assess  Muscle Loss: Unable to assess  Fluid Accumulation/Edema: None noted per provider not this morning  Malnutrition Diagnosis: Unable to determine due to pt busy during attempts to see    NUTRITION DIAGNOSIS  Inadequate oral intake related to reports of poor PO intake at home and  as evidenced by RN consult for patient report of poor intake at home and NPO/clear liquid diet x 1 day      INTERVENTIONS  Implementation  Nutrition Education: Unable to complete due to pt busy/requested RD try to come back later during attempts to see     Goals  Diet adv v nutrition support within 2-3 days.    Monitoring/Evaluation  Progress toward goals will be monitored and evaluated per protocol.     Anjelica Wilson, RD, LD  6C RD Pager: [phone]

## 2017-01-11 NOTE — ANESTHESIA CARE TRANSFER NOTE
Patient: Nevin Alvarado    EXAM UNDER ANESTHESIA ANUS (N/A Rectum)  LAPAROTOMY EXPLORATORY (N/A Abdomen)  Additional InformationProcedure(s):  Exam under anesthesia, flexible sigmoidoscopy, rigid sigmoidproctoscopy, exploratory laparotomy, extraction of rectal foreign body. - Wound Class: IV-Dirty or Infected   - Wound Class: I-Clean    Diagnosis: Colorectal Foreign Body  Diagnosis Additional Information: No value filed.    Anesthesia Type:   General, ETT     Note:  Airway :Face Mask  Patient transferred to:PACU  Comments: VSS. Breathing spontaneously at a regular rate with adequate tidal volumes and maintaining O2 sats on 6L facemask. Denies nausea or pain. No apparent complications from anesthesia.     Shira Agosto MD  Anesthesiology resident   [facility]        Vitals: (Last set prior to Anesthesia Care Transfer)              Electronically Signed By: Shira Agosto MD  January 11, 2017  12:24 AM
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
## 2017-01-12 ENCOUNTER — APPOINTMENT (OUTPATIENT)
Dept: OCCUPATIONAL THERAPY | Facility: CLINIC | Age: 27
End: 2017-01-12
Payer: MEDICAID

## 2017-01-12 LAB
ERYTHROCYTE [DISTWIDTH] IN BLOOD BY AUTOMATED COUNT: 13.7 % (ref 10–15)
GLUCOSE BLDC GLUCOMTR-MCNC: 116 MG/DL (ref 70–99)
HCT VFR BLD AUTO: 33.4 % (ref 35–47)
HGB BLD-MCNC: 10.6 G/DL (ref 11.7–15.7)
HIV 1+2 AB+HIV1 P24 AG SERPL QL IA: NORMAL
INTERPRETATION ECG - MUSE: NORMAL
MCH RBC QN AUTO: 29 PG (ref 26.5–33)
MCHC RBC AUTO-ENTMCNC: 31.7 G/DL (ref 31.5–36.5)
MCV RBC AUTO: 92 FL (ref 78–100)
PLATELET # BLD AUTO: 180 10E9/L (ref 150–450)
RBC # BLD AUTO: 3.65 10E12/L (ref 3.8–5.2)
T PALLIDUM IGG+IGM SER QL: NEGATIVE
WBC # BLD AUTO: 6.6 10E9/L (ref 4–11)

## 2017-01-12 PROCEDURE — 25800025 ZZH RX 258: Performed by: SURGERY

## 2017-01-12 PROCEDURE — 25000125 ZZHC RX 250: Performed by: SURGERY

## 2017-01-12 PROCEDURE — 21400003 ZZH R&B CCU CRITICAL UMMC

## 2017-01-12 PROCEDURE — 40000556 ZZH STATISTIC PERIPHERAL IV START W US GUIDANCE

## 2017-01-12 PROCEDURE — 85027 COMPLETE CBC AUTOMATED: CPT | Performed by: SURGERY

## 2017-01-12 PROCEDURE — 97535 SELF CARE MNGMENT TRAINING: CPT | Mod: GO | Performed by: OCCUPATIONAL THERAPIST

## 2017-01-12 PROCEDURE — 25000132 ZZH RX MED GY IP 250 OP 250 PS 637: Performed by: SURGERY

## 2017-01-12 PROCEDURE — 36415 COLL VENOUS BLD VENIPUNCTURE: CPT | Performed by: SURGERY

## 2017-01-12 PROCEDURE — 25000132 ZZH RX MED GY IP 250 OP 250 PS 637: Performed by: COLON & RECTAL SURGERY

## 2017-01-12 PROCEDURE — 97165 OT EVAL LOW COMPLEX 30 MIN: CPT | Mod: GO | Performed by: OCCUPATIONAL THERAPIST

## 2017-01-12 PROCEDURE — 00000146 ZZHCL STATISTIC GLUCOSE BY METER IP

## 2017-01-12 PROCEDURE — 25000130 H RX MED GY IP 250 OP 259 PS 637: Performed by: SURGERY

## 2017-01-12 PROCEDURE — 40000133 ZZH STATISTIC OT WARD VISIT: Performed by: OCCUPATIONAL THERAPIST

## 2017-01-12 PROCEDURE — 40000559 ZZH STATISTIC FAILED PERIPHERAL IV START

## 2017-01-12 RX ORDER — ACETAMINOPHEN 500 MG
1000 TABLET ORAL EVERY 6 HOURS
Status: DISCONTINUED | OUTPATIENT
Start: 2017-01-12 | End: 2017-01-13 | Stop reason: HOSPADM

## 2017-01-12 RX ORDER — DIPHENHYDRAMINE HCL 25 MG
25-50 CAPSULE ORAL ONCE
Status: COMPLETED | OUTPATIENT
Start: 2017-01-12 | End: 2017-01-12

## 2017-01-12 RX ORDER — CEFTRIAXONE 1 G/1
1 INJECTION, POWDER, FOR SOLUTION INTRAMUSCULAR; INTRAVENOUS ONCE
Status: COMPLETED | OUTPATIENT
Start: 2017-01-12 | End: 2017-01-12

## 2017-01-12 RX ORDER — DOXYCYCLINE 100 MG/1
100 CAPSULE ORAL EVERY 12 HOURS SCHEDULED
Status: DISCONTINUED | OUTPATIENT
Start: 2017-01-12 | End: 2017-01-12

## 2017-01-12 RX ADMIN — ACETAMINOPHEN 1000 MG: 500 TABLET, FILM COATED ORAL at 08:56

## 2017-01-12 RX ADMIN — SODIUM CHLORIDE, POTASSIUM CHLORIDE, SODIUM LACTATE AND CALCIUM CHLORIDE 1000 ML: 600; 310; 30; 20 INJECTION, SOLUTION INTRAVENOUS at 12:17

## 2017-01-12 RX ADMIN — ACETAMINOPHEN 1000 MG: 10 INJECTION, SOLUTION INTRAVENOUS at 01:50

## 2017-01-12 RX ADMIN — PANTOPRAZOLE SODIUM 40 MG: 40 INJECTION, POWDER, FOR SOLUTION INTRAVENOUS at 04:27

## 2017-01-12 RX ADMIN — TOPIRAMATE 50 MG: 25 TABLET ORAL at 08:56

## 2017-01-12 RX ADMIN — SODIUM CHLORIDE, POTASSIUM CHLORIDE, SODIUM LACTATE AND CALCIUM CHLORIDE: 600; 310; 30; 20 INJECTION, SOLUTION INTRAVENOUS at 06:14

## 2017-01-12 RX ADMIN — HYDROXYZINE HYDROCHLORIDE 50 MG: 50 TABLET, FILM COATED ORAL at 16:42

## 2017-01-12 RX ADMIN — HYDROXYZINE HYDROCHLORIDE 25 MG: 25 TABLET ORAL at 09:42

## 2017-01-12 RX ADMIN — METOCLOPRAMIDE 5 MG: 5 INJECTION, SOLUTION INTRAMUSCULAR; INTRAVENOUS at 20:27

## 2017-01-12 RX ADMIN — BUSPIRONE HYDROCHLORIDE 10 MG: 10 TABLET ORAL at 20:19

## 2017-01-12 RX ADMIN — CEFTRIAXONE 1 G: 1 INJECTION, POWDER, FOR SOLUTION INTRAMUSCULAR; INTRAVENOUS at 10:43

## 2017-01-12 RX ADMIN — BUSPIRONE HYDROCHLORIDE 5 MG: 5 TABLET ORAL at 08:57

## 2017-01-12 RX ADMIN — Medication: at 22:30

## 2017-01-12 RX ADMIN — METOCLOPRAMIDE 5 MG: 5 INJECTION, SOLUTION INTRAMUSCULAR; INTRAVENOUS at 01:48

## 2017-01-12 RX ADMIN — ENOXAPARIN SODIUM 40 MG: 40 INJECTION SUBCUTANEOUS at 20:18

## 2017-01-12 RX ADMIN — TOPIRAMATE 100 MG: 25 TABLET ORAL at 20:19

## 2017-01-12 RX ADMIN — ONDANSETRON 4 MG: 2 INJECTION INTRAMUSCULAR; INTRAVENOUS at 14:29

## 2017-01-12 RX ADMIN — Medication: at 06:13

## 2017-01-12 RX ADMIN — DIPHENHYDRAMINE HYDROCHLORIDE 25 MG: 25 CAPSULE ORAL at 09:03

## 2017-01-12 RX ADMIN — METOCLOPRAMIDE 5 MG: 5 INJECTION, SOLUTION INTRAMUSCULAR; INTRAVENOUS at 09:12

## 2017-01-12 RX ADMIN — ENOXAPARIN SODIUM 40 MG: 40 INJECTION SUBCUTANEOUS at 08:57

## 2017-01-12 RX ADMIN — DESVENLAFAXINE SUCCINATE 100 MG: 50 TABLET, EXTENDED RELEASE ORAL at 08:56

## 2017-01-12 RX ADMIN — LIDOCAINE 1 PATCH: 50 PATCH TOPICAL at 08:57

## 2017-01-12 RX ADMIN — ACETAMINOPHEN 1000 MG: 500 TABLET, FILM COATED ORAL at 14:29

## 2017-01-12 ASSESSMENT — PAIN DESCRIPTION - DESCRIPTORS
DESCRIPTORS: SHARP
DESCRIPTORS: ACHING;SHARP
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
## 2017-01-12 NOTE — PLAN OF CARE
Problem: Goal Outcome Summary  Goal: Goal Outcome Summary  Outcome: No Change  D: Admitted 1/10 s/p ex lab, flex sig, rigid sigmoid proctoscopy and extraction for rectal foreign body. Previous admissions for similar problems.  I/A: VSS on 1 LPM O2 via NC. C/o severe abdominal pain, on hydromorphone PCA, scheduled IV tylenol, given atarax x1 as adjunct. Reports difficulty controlling with current regimen but was able to sleep much of night. Endorses nausea, relieved by prn reglan. Up to commode with Ax2. Voiding small amounts. Post void residual 468 cc at 0230, straight cath for 475 cc. Collected HCG, result negative. Slept between cares.  P: Continue to monitor and assess. Notify colorectal surgery with concerns. [place] police to interview pt today, would like to have [facility] advocate present.

## 2017-01-12 NOTE — PLAN OF CARE
Problem: Goal Outcome Summary  Goal: Goal Outcome Summary  PT 6C: PT orders received, per chart review and discussion with OT who saw pt this morning, pt was previously Independent for all mobility, but is limited secondary to significant pain. PT will hold evaluation for today. Will consult with OT tomorrow and initiate as needed.

## 2017-01-12 NOTE — PLAN OF CARE
Problem: Goal Outcome Summary  Goal: Goal Outcome Summary  Outcome: No Change  VSS, RA.  Pain made better by lidocaine patch (on abdomen), PCA dilaudid, scheduled tylenol, and PRN atarax.  Plan to D/c PCA pump in AM 1/13.  Nausea made better by PRN zofran and reglan.  Pt up with 2 and a walker to bathroom, voiding adequate amounts.  Abdominal brace has provided adequate relief to pt when moving.  Pt ambulated in hallway x2.  HR in low 100s at 1200.  Pt felt dizzy and lightheaded when up to the commode.  LR 1 L fluid bolus given.  No reports of feeling lightheaded or dizzy since.  LR currently running at 50 ml/hr.  Pt reports excellent appetite, regular diet ordered.  Discharge is possible tomorrow.  Will continue to monitor and notify physician with any concerns or questions.
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
## 2017-01-12 NOTE — PLAN OF CARE
Problem: Goal Outcome Summary  Goal: Goal Outcome Summary  OT6C: Anticipate pt will be able to discharge home pending functional improvement. Pt is limited by pain, fatigue, weakness, post-surgical precautions. Pt required max A for bed mobility and cueing for proper technique to adhere to post-surgical precautions. Pt ambulated to bathroom using FWW and SBA while wearing ab binder.

## 2017-01-13 ENCOUNTER — APPOINTMENT (OUTPATIENT)
Dept: OCCUPATIONAL THERAPY | Facility: CLINIC | Age: 27
End: 2017-01-13
Payer: MEDICAID

## 2017-01-13 VITALS
OXYGEN SATURATION: 97 % | RESPIRATION RATE: 18 BRPM | HEART RATE: 82 BPM | TEMPERATURE: 98.3 F | SYSTOLIC BLOOD PRESSURE: 132 MMHG | WEIGHT: 230.9 LBS | DIASTOLIC BLOOD PRESSURE: 74 MMHG | BODY MASS INDEX: 42.22 KG/M2

## 2017-01-13 DIAGNOSIS — G89.18 ACUTE POST-OPERATIVE PAIN: Primary | ICD-10-CM

## 2017-01-13 LAB — COPATH REPORT: NORMAL

## 2017-01-13 PROCEDURE — 40000133 ZZH STATISTIC OT WARD VISIT: Performed by: OCCUPATIONAL THERAPIST

## 2017-01-13 PROCEDURE — 25000125 ZZHC RX 250: Performed by: SURGERY

## 2017-01-13 PROCEDURE — 25000132 ZZH RX MED GY IP 250 OP 250 PS 637: Performed by: SURGERY

## 2017-01-13 PROCEDURE — 97535 SELF CARE MNGMENT TRAINING: CPT | Mod: GO | Performed by: OCCUPATIONAL THERAPIST

## 2017-01-13 RX ORDER — POLYETHYLENE GLYCOL 3350 17 G/17G
17 POWDER, FOR SOLUTION ORAL DAILY
Status: DISCONTINUED | OUTPATIENT
Start: 2017-01-13 | End: 2017-01-13 | Stop reason: HOSPADM

## 2017-01-13 RX ORDER — LIDOCAINE 50 MG/G
1 PATCH TOPICAL EVERY 24 HOURS
Qty: 30 PATCH | Refills: 3 | Status: SHIPPED
Start: 2017-01-13 | End: 2017-01-13

## 2017-01-13 RX ORDER — POLYETHYLENE GLYCOL 3350 17 G/17G
17 POWDER, FOR SOLUTION ORAL DAILY PRN
Qty: 510 G | Refills: 3 | Status: SHIPPED
Start: 2017-01-13 | End: 2017-06-17

## 2017-01-13 RX ORDER — HYDROCODONE BITARTRATE AND ACETAMINOPHEN 5; 325 MG/1; MG/1
1-2 TABLET ORAL EVERY 4 HOURS PRN
Status: DISCONTINUED | OUTPATIENT
Start: 2017-01-13 | End: 2017-01-13

## 2017-01-13 RX ORDER — OXYCODONE HYDROCHLORIDE 5 MG/1
5-10 TABLET ORAL EVERY 4 HOURS PRN
Status: DISCONTINUED | OUTPATIENT
Start: 2017-01-13 | End: 2017-01-13 | Stop reason: HOSPADM

## 2017-01-13 RX ORDER — OXYCODONE AND ACETAMINOPHEN 5; 325 MG/1; MG/1
1-2 TABLET ORAL EVERY 8 HOURS PRN
Qty: 30 TABLET | Refills: 0 | Status: SHIPPED | OUTPATIENT
Start: 2017-01-13 | End: 2017-01-20

## 2017-01-13 RX ORDER — OXYCODONE AND ACETAMINOPHEN 5; 325 MG/1; MG/1
1-2 TABLET ORAL EVERY 8 HOURS PRN
Qty: 30 TABLET | Refills: 0 | Status: SHIPPED | OUTPATIENT
Start: 2017-01-13 | End: 2017-01-13

## 2017-01-13 RX ORDER — POLYETHYLENE GLYCOL 3350 17 G/17G
17 POWDER, FOR SOLUTION ORAL DAILY
Qty: 510 G | Refills: 3 | Status: SHIPPED
Start: 2017-01-13 | End: 2017-01-13

## 2017-01-13 RX ORDER — LIDOCAINE 50 MG/G
1 PATCH TOPICAL EVERY 24 HOURS
Qty: 30 PATCH | Refills: 3 | Status: SHIPPED
Start: 2017-01-13 | End: 2017-02-07

## 2017-01-13 RX ADMIN — ACETAMINOPHEN 1000 MG: 500 TABLET, FILM COATED ORAL at 08:22

## 2017-01-13 RX ADMIN — OXYCODONE HYDROCHLORIDE 5 MG: 5 TABLET ORAL at 08:24

## 2017-01-13 RX ADMIN — POLYETHYLENE GLYCOL 3350 17 G: 17 POWDER, FOR SOLUTION ORAL at 08:23

## 2017-01-13 RX ADMIN — HYDROXYZINE HYDROCHLORIDE 50 MG: 50 TABLET, FILM COATED ORAL at 01:43

## 2017-01-13 RX ADMIN — OXYCODONE HYDROCHLORIDE 10 MG: 5 TABLET ORAL at 11:57

## 2017-01-13 RX ADMIN — OXYCODONE HYDROCHLORIDE 5 MG: 5 TABLET ORAL at 06:04

## 2017-01-13 RX ADMIN — TOPIRAMATE 50 MG: 25 TABLET ORAL at 08:23

## 2017-01-13 RX ADMIN — HYDROXYZINE HYDROCHLORIDE 50 MG: 50 TABLET, FILM COATED ORAL at 09:57

## 2017-01-13 RX ADMIN — ENOXAPARIN SODIUM 40 MG: 40 INJECTION SUBCUTANEOUS at 08:23

## 2017-01-13 RX ADMIN — BUSPIRONE HYDROCHLORIDE 5 MG: 5 TABLET ORAL at 08:22

## 2017-01-13 RX ADMIN — LIDOCAINE 1 PATCH: 50 PATCH TOPICAL at 08:25

## 2017-01-13 RX ADMIN — DESVENLAFAXINE SUCCINATE 100 MG: 50 TABLET, EXTENDED RELEASE ORAL at 08:23

## 2017-01-13 RX ADMIN — Medication 1 LOZENGE: at 01:43

## 2017-01-13 RX ADMIN — ACETAMINOPHEN 1000 MG: 500 TABLET, FILM COATED ORAL at 01:42

## 2017-01-13 ASSESSMENT — PAIN DESCRIPTION - DESCRIPTORS: DESCRIPTORS: ACHING;PRESSURE

## 2017-01-13 NOTE — PLAN OF CARE
Problem: Goal Outcome Summary  Goal: Goal Outcome Summary  Outcome: No Change  D: Admitted 1/10 s/p ex lab, flex sig, rigid sigmoid proctoscopy and extraction for rectal foreign body. Previous admissions for similar problems.  I/A: VSS on room air. C/o abdominal pain, on hydromorphone PCA, scheduled IV tylenol, given atarax x1 as adjunct. Improved pain control from previous day. Endorses nausea, relieved by prn reglan. LR at 50 cc/hr overnight. Up to commode with Ax1-2. Adequate UOP.   P: Continue to monitor and assess. Notify colorectal surgery with concerns.     Update: D/C'ed LR, PCA. Given prn oxycodone at time of D/C.
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
## 2017-01-13 NOTE — PLAN OF CARE
Problem: Goal Outcome Summary  Goal: Goal Outcome Summary  OT6C: Recommend discharge home with [facility] to help with ADLs and medication management. Pt limited by fatigue, pain, and post-surgical precautions. Pt ambulated a total of ~400 ft to and from cardiac gym with SBA using a FWW. Pt demonstrated compensatory techniques for bed mobility, such as using a wedge. Pt c/o increased pain transferring from EOB to supine. Pt may benefit from shower chair.       Occupational Therapy Discharge Summary    Reason for therapy discharge:    Discharged to home with [facility]    Progress towards therapy goal(s). See goals on Care Plan in [facility] electronic health record for goal details.  Goals partially met.  Barriers to achieving goals:   discharge from facility.    Therapy recommendation(s):    Continue home exercise program.

## 2017-01-13 NOTE — PROGRESS NOTES
Rx reprinted for patient , called and canceled previous rx sent to [facility]

## 2017-01-13 NOTE — PROGRESS NOTES
Care Coordinator- Discharge Planning     Admission Date/Time:  1/10/2017  Attending MD:  Annmarie Haynes   Data  Chart reviewed, discussed with interdisciplinary team.   Patient was admitted for:   1. Rectal foreign body, initial encounter    Assessment  Concerns with insurance coverage for discharge needs: None.  Current Living Situation: Patient lives in an apt.   Support System: Supportive and Involved from [employer]   Services Involved: [employer], mental health , [facility] .   Transportation: [place] transportation,  [facility] [phone]  Coordination of Care and Referrals: Provided patient/family with options for home health care.         This writer received phone call from Becca Neves (Ph: [phone]) Mental Health  with [employer]. Becca expressed concern about pt returning back to her apt. Pt gave this writer verbal permission to update Becca Neves. Referral made to  to contact Becca to discuss pt's mental health issues. Per Lisa LEMUS, Becca is in agreement with pt returning to her apt. Pt is also in agreement with returning home to her apt.       This writer spoke with Valencia Higgins (Ph: [phone]) [facility]  to discuss arranging HHA. Valencia said pt already receives 24/7 assist with [employer] (whose office is in the same building as pt) and they also provide med management, 5 meals per weeks are delivered, and pt has a cell phone to use for emergencies. Valencia is in agreement with arranging home care with [facility] for RN and HHA 3 times per week to assist with showering. Valencia also said that a shower chair can be ordered.       This writer spoke with [facility] to request w/c ride home and was told to call [employer] (Ph: [phone]) to obtain SCS certification. Frieda from [employer] said that pt has been approved for SCS certification (Case# [id number]); then Frieda said that this writer had to find pt a ride home. This writer called multiple  transport companies and none could provide pt with a w/c for a ride home. [employer] needed to be used for pt's ride home. [employer] notified of discharge time and their staff will assist pt to her apt.   Intervention:       Arrangements made with [facility] (Ph: [phone] Fax: [phone]) for RN eval post hospitalization, assess vital signs, respiratory and cardiac status, activity tolerance, hydration, nutritional status. HHA to assist with showering 3 times per week.       [facility] to provide pt with a walker and shower chair for home use.   Plan  Anticipated Discharge Date:  1/13/17; Wheel chair ride home arranged with [employer] (Ph: [phone]) at 2 PM.   Anticipated Discharge Plan:  Discharge to home.     CTS Handoff completed:  YES    URMILA VILLARREAL RN BSN  Care Coordinator
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
## 2017-01-13 NOTE — PLAN OF CARE
DISCHARGE   Discharged to: Home  Via: [facility] transport  Accompanied by: self  Discharge Instructions: diet, activity, medications, follow up appointments, when to call the MD, and what to watchout for (i.e. s/s of infection, increasing SOB, palpitations, chest pain,)  Prescriptions: To be filled by  [facility] per pt's request; medication list reviewed & sent with pt  Follow Up Appointments: arranged; information given  Belongings: All sent with pt, retrieved from security.  IV: out  Telemetry: off  Pt exhibits understanding of above discharge instructions; all questions answered.  Discharge Paperwork: faxed
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
## 2017-01-16 ENCOUNTER — APPOINTMENT (OUTPATIENT)
Dept: CT IMAGING | Facility: CLINIC | Age: 27
End: 2017-01-16
Attending: EMERGENCY MEDICINE
Payer: MEDICAID

## 2017-01-16 ENCOUNTER — CARE COORDINATION (OUTPATIENT)
Dept: CARE COORDINATION | Facility: CLINIC | Age: 27
End: 2017-01-16

## 2017-01-16 ENCOUNTER — HOSPITAL ENCOUNTER (EMERGENCY)
Facility: CLINIC | Age: 27
Discharge: HOME | End: 2017-01-16
Attending: EMERGENCY MEDICINE | Admitting: EMERGENCY MEDICINE
Payer: MEDICAID

## 2017-01-16 ENCOUNTER — TELEPHONE (OUTPATIENT)
Dept: SURGERY | Facility: CLINIC | Age: 27
End: 2017-01-16

## 2017-01-16 VITALS
RESPIRATION RATE: 14 BRPM | HEART RATE: 95 BPM | TEMPERATURE: 97.5 F | HEIGHT: 62 IN | DIASTOLIC BLOOD PRESSURE: 70 MMHG | OXYGEN SATURATION: 98 % | SYSTOLIC BLOOD PRESSURE: 122 MMHG

## 2017-01-16 DIAGNOSIS — R10.84 ABDOMINAL PAIN, GENERALIZED: ICD-10-CM
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
## 2017-01-16 LAB
ALBUMIN SERPL-MCNC: 3.4 G/DL (ref 3.4–5)
ALP SERPL-CCNC: 84 U/L (ref 40–150)
ALT SERPL W P-5'-P-CCNC: 102 U/L (ref 0–50)
ANION GAP SERPL CALCULATED.3IONS-SCNC: 8 MMOL/L (ref 3–14)
AST SERPL W P-5'-P-CCNC: 83 U/L (ref 0–45)
BASOPHILS # BLD AUTO: 0 10E9/L (ref 0–0.2)
BASOPHILS NFR BLD AUTO: 0.4 %
BILIRUB SERPL-MCNC: 0.1 MG/DL (ref 0.2–1.3)
BUN SERPL-MCNC: 11 MG/DL (ref 7–30)
CALCIUM SERPL-MCNC: 8.4 MG/DL (ref 8.5–10.1)
CHLORIDE SERPL-SCNC: 110 MMOL/L (ref 94–109)
CO2 SERPL-SCNC: 24 MMOL/L (ref 20–32)
CREAT SERPL-MCNC: 0.6 MG/DL (ref 0.52–1.04)
DIFFERENTIAL METHOD BLD: ABNORMAL
EOSINOPHIL # BLD AUTO: 0.3 10E9/L (ref 0–0.7)
EOSINOPHIL NFR BLD AUTO: 5.5 %
ERYTHROCYTE [DISTWIDTH] IN BLOOD BY AUTOMATED COUNT: 13.8 % (ref 10–15)
GFR SERPL CREATININE-BSD FRML MDRD: ABNORMAL ML/MIN/1.7M2
GLUCOSE SERPL-MCNC: 102 MG/DL (ref 70–99)
HCG UR QL: NORMAL
HCT VFR BLD AUTO: 37.1 % (ref 35–47)
HGB BLD-MCNC: 11.9 G/DL (ref 11.7–15.7)
IMM GRANULOCYTES # BLD: 0 10E9/L (ref 0–0.4)
IMM GRANULOCYTES NFR BLD: 0.2 %
INTERNAL QC OK POCT: YES
LACTATE BLD-SCNC: 1.2 MMOL/L (ref 0.7–2.1)
LYMPHOCYTES # BLD AUTO: 1.5 10E9/L (ref 0.8–5.3)
LYMPHOCYTES NFR BLD AUTO: 33.2 %
MCH RBC QN AUTO: 29.3 PG (ref 26.5–33)
MCHC RBC AUTO-ENTMCNC: 32.1 G/DL (ref 31.5–36.5)
MCV RBC AUTO: 91 FL (ref 78–100)
MONOCYTES # BLD AUTO: 0.6 10E9/L (ref 0–1.3)
MONOCYTES NFR BLD AUTO: 13 %
NEUTROPHILS # BLD AUTO: 2.2 10E9/L (ref 1.6–8.3)
NEUTROPHILS NFR BLD AUTO: 47.7 %
NRBC # BLD AUTO: 0 10*3/UL
NRBC BLD AUTO-RTO: 1 /100
PLATELET # BLD AUTO: 270 10E9/L (ref 150–450)
POTASSIUM SERPL-SCNC: 3.9 MMOL/L (ref 3.4–5.3)
PROT SERPL-MCNC: 7 G/DL (ref 6.8–8.8)
RBC # BLD AUTO: 4.06 10E12/L (ref 3.8–5.2)
SODIUM SERPL-SCNC: 142 MMOL/L (ref 133–144)
WBC # BLD AUTO: 4.6 10E9/L (ref 4–11)

## 2017-01-16 PROCEDURE — 25000128 H RX IP 250 OP 636: Performed by: EMERGENCY MEDICINE

## 2017-01-16 PROCEDURE — 25000125 ZZHC RX 250: Performed by: EMERGENCY MEDICINE

## 2017-01-16 PROCEDURE — 96361 HYDRATE IV INFUSION ADD-ON: CPT | Performed by: EMERGENCY MEDICINE

## 2017-01-16 PROCEDURE — 25000125 ZZHC RX 250: Performed by: RADIOLOGY

## 2017-01-16 PROCEDURE — 83605 ASSAY OF LACTIC ACID: CPT | Performed by: EMERGENCY MEDICINE

## 2017-01-16 PROCEDURE — 25500064 ZZH RX 255 OP 636: Performed by: RADIOLOGY

## 2017-01-16 PROCEDURE — 96374 THER/PROPH/DIAG INJ IV PUSH: CPT | Mod: 59 | Performed by: EMERGENCY MEDICINE

## 2017-01-16 PROCEDURE — 85025 COMPLETE CBC W/AUTO DIFF WBC: CPT | Performed by: EMERGENCY MEDICINE

## 2017-01-16 PROCEDURE — 74177 CT ABD & PELVIS W/CONTRAST: CPT

## 2017-01-16 PROCEDURE — 99284 EMERGENCY DEPT VISIT MOD MDM: CPT | Mod: Z6 | Performed by: EMERGENCY MEDICINE

## 2017-01-16 PROCEDURE — 96375 TX/PRO/DX INJ NEW DRUG ADDON: CPT | Mod: 59 | Performed by: EMERGENCY MEDICINE

## 2017-01-16 PROCEDURE — 99285 EMERGENCY DEPT VISIT HI MDM: CPT | Mod: 25 | Performed by: EMERGENCY MEDICINE

## 2017-01-16 PROCEDURE — 96376 TX/PRO/DX INJ SAME DRUG ADON: CPT | Performed by: EMERGENCY MEDICINE

## 2017-01-16 PROCEDURE — 80053 COMPREHEN METABOLIC PANEL: CPT | Performed by: EMERGENCY MEDICINE

## 2017-01-16 PROCEDURE — 81025 URINE PREGNANCY TEST: CPT | Performed by: EMERGENCY MEDICINE

## 2017-01-16 RX ORDER — HYDROMORPHONE HYDROCHLORIDE 1 MG/ML
0.5 INJECTION, SOLUTION INTRAMUSCULAR; INTRAVENOUS; SUBCUTANEOUS
Status: COMPLETED | OUTPATIENT
Start: 2017-01-16 | End: 2017-01-16

## 2017-01-16 RX ORDER — IOPAMIDOL 755 MG/ML
140 INJECTION, SOLUTION INTRAVASCULAR ONCE
Status: COMPLETED | OUTPATIENT
Start: 2017-01-16 | End: 2017-01-16

## 2017-01-16 RX ORDER — SODIUM CHLORIDE 9 MG/ML
1000 INJECTION, SOLUTION INTRAVENOUS CONTINUOUS
Status: DISCONTINUED | OUTPATIENT
Start: 2017-01-16 | End: 2017-01-16 | Stop reason: HOSPADM

## 2017-01-16 RX ORDER — MORPHINE SULFATE 4 MG/ML
4 INJECTION, SOLUTION INTRAMUSCULAR; INTRAVENOUS
Status: COMPLETED | OUTPATIENT
Start: 2017-01-16 | End: 2017-01-16

## 2017-01-16 RX ORDER — LIDOCAINE 40 MG/G
CREAM TOPICAL
Status: DISCONTINUED | OUTPATIENT
Start: 2017-01-16 | End: 2017-01-16 | Stop reason: HOSPADM

## 2017-01-16 RX ORDER — ONDANSETRON 2 MG/ML
4 INJECTION INTRAMUSCULAR; INTRAVENOUS EVERY 30 MIN PRN
Status: DISCONTINUED | OUTPATIENT
Start: 2017-01-16 | End: 2017-01-16 | Stop reason: HOSPADM

## 2017-01-16 RX ADMIN — HYDROMORPHONE HYDROCHLORIDE 0.5 MG: 10 INJECTION, SOLUTION INTRAMUSCULAR; INTRAVENOUS; SUBCUTANEOUS at 04:21

## 2017-01-16 RX ADMIN — SODIUM CHLORIDE 1000 ML: 9 INJECTION, SOLUTION INTRAVENOUS at 03:00

## 2017-01-16 RX ADMIN — HYDROMORPHONE HYDROCHLORIDE 0.5 MG: 10 INJECTION, SOLUTION INTRAMUSCULAR; INTRAVENOUS; SUBCUTANEOUS at 03:45

## 2017-01-16 RX ADMIN — HYDROMORPHONE HYDROCHLORIDE 0.5 MG: 10 INJECTION, SOLUTION INTRAMUSCULAR; INTRAVENOUS; SUBCUTANEOUS at 03:03

## 2017-01-16 RX ADMIN — ONDANSETRON 4 MG: 2 INJECTION INTRAMUSCULAR; INTRAVENOUS at 03:00

## 2017-01-16 RX ADMIN — IOPAMIDOL 140 ML: 755 INJECTION, SOLUTION INTRAVENOUS at 06:42

## 2017-01-16 RX ADMIN — SODIUM CHLORIDE, PRESERVATIVE FREE 80 ML: 5 INJECTION INTRAVENOUS at 06:42

## 2017-01-16 RX ADMIN — MORPHINE SULFATE 4 MG: 4 INJECTION, SOLUTION INTRAMUSCULAR; INTRAVENOUS at 05:33

## 2017-01-16 ASSESSMENT — ENCOUNTER SYMPTOMS
ANAL BLEEDING: 0
FEVER: 0
VOMITING: 0
HEMATURIA: 0
ABDOMINAL PAIN: 1
SHORTNESS OF BREATH: 0
NAUSEA: 1

## 2017-01-16 NOTE — ED NOTES
Bed: ED19  Expected date: 1/16/17  Expected time: 1:33 AM  Means of arrival: Ambulance  Comments:  [place] with a 25 yo female c/o fall and generalized pain.

## 2017-01-16 NOTE — ED AVS SNAPSHOT
[facility], Emergency Department    [address] [facility] [address]    Phone:  [phone]                                       Nevin Alvarado   MRN: [id number]    Department:  [facility], Emergency Department   Date of Visit:  1/16/2017           Patient Information     Date Of Birth          1990        Your diagnoses for this visit were:     Abdominal pain, generalized     Fall, initial encounter        You were seen by Efraín Macedo MD.        Discharge Instructions       Please make an appointment to follow up with Your Primary Care Provider in 5-7 days        *Abdominal Pain, Unknown Cause (Female)    The exact cause of your abdominal (stomach) pain is not certain. This does not mean that this is something to worry about, or the right tests were not done. Everyone likes to know the exact cause of the problem, but sometimes with abdominal pain, there is no clear-cut cause, and this could be a good thing. The good news is that your symptoms can be treated, and you will feel better.   Your condition does not seem serious now; however, sometimes the signs of a serious problem may take more time to appear. For this reason, it is important for you to watch for any new symptoms, problems, or worsening of your condition.  Over the next few days, the abdominal pain may come and go, or be continuous. Other common symptoms can include nausea and vomiting. Sometimes it can be difficult to tell if you feel nauseous, you may just feel bad and not associate that feeling with nausea. Constipation, diarrhea, and a fever may go along with the pain.  The pain may continue even if treated correctly over the following days. Depending on how things go, sometimes the cause can become clear and may require further or different treatment. Additional evaluations, medications, or tests may be needed.  Home care  Your health care provider may prescribe medications for pain, symptoms, or an infection.   Follow the health care provider's instructions for taking these medications.  General care    Rest until your next exam. No strenuous activities.    Try to find positions that ease discomfort. A small pillow placed on the abdomen may help relieve pain.    Something warm on your abdomen (such as a heating pad) may help, but be careful not to burn yourself.  Diet    Do not force yourself to eat, especially if having cramps, vomiting, or diarrhea.    Water is important so you do not get dehydrated. Soup may also be good. Sports drinks may also help, especially if they are not too acidic. Make sure you don't drink sugary drinks as this can make things worse. Take liquids in small amounts. Do not guzzle them.    Caffeine sometimes makes the pain and cramping worse.    Avoid dairy products if you have vomiting or diarrhea.    Don't eat large amounts at a time. Wait a few minutes between bites.    Eat a diet low in fiber (called a low-residue diet). Foods allowed include refined breads, white rice, fruit and vegetable juices without pulp, tender meats. These foods will pass more easily through the intestine.    Avoid fried or fatty foods, dairy, alcohol and spicy foods until your symptoms go away.  Follow-up care  Follow up with your health care provider as instructed, or if your pain does not begin to improve in the next 24 hours.  When to seek medical care  Seek prompt medical care if any of the following occur:    Pain gets worse or moves to the right lower abdomen    New or worsening vomiting or diarrhea    Swelling of the abdomen    Unable to pass stool for more than three days    New fever over 101  F (38.3 C), or rising fever    Blood in vomit or bowel movements (dark red or black color)    Jaundice (yellow color of eyes and skin)    Weakness, dizziness    Chest, arm, back, neck or jaw pain    Unexpected vaginal bleeding or missed period  Call 911  Call emergency services if any of the following occur:    Trouble  breathing    Confusion    Fainting or loss of consciousness    Rapid heart rate    Seizure    [id number] Ricardo Lopez, [address], [place] [address]. All rights reserved. This information is not intended as a substitute for professional medical care. Always follow your healthcare professional's instructions.           24 Hour Appointment Hotline       To make an appointment at any [facility], call [phone] ([phone]). If you don't have a family doctor or clinic, we will help you find one. [facility] are conveniently located to serve the needs of you and your family.             Review of your medicines      Our records show that you are taking the medicines listed below. If these are incorrect, please call your family doctor or clinic.        Dose / Directions Last dose taken    * BUSPIRONE HCL PO   Dose:  5 mg        Take 5 mg by mouth every morning   Refills:  0        * BUSPIRONE HCL PO   Dose:  10 mg        Take 10 mg by mouth every evening   Refills:  0        ibuprofen 200 MG tablet   Commonly known as:  ADVIL/MOTRIN   Dose:  600 mg   Quantity:  100 tablet        Take 3 tablets (600 mg) by mouth every 6 hours as needed for other (Headache)   Refills:  0        lidocaine 5 % Patch   Commonly known as:  LIDODERM   Dose:  1 patch   Quantity:  30 patch        Place 1 patch onto the skin every 24 hours May substitute over the counter 4% lidocaine patches instead. Place on abdomen for incisional pain.   Refills:  3        ondansetron 4 MG tablet   Commonly known as:  ZOFRAN   Dose:  4 mg   Quantity:  8 tablet        Take 1 tablet (4 mg) by mouth every 6 hours   Refills:  0        * order for DME   Quantity:  1 each        Equipment being ordered: Other: shower chair Treatment Diagnosis: s/p exploratory laparotomy for rectal foreign body   Refills:  0        * order for DME   Quantity:  1 Units        Equipment being ordered: Walker Wheels (), Walker () and Bath Seat  () Treatment Diagnosis: unstable gait   Refills:  0        oxyCODONE-acetaminophen 5-325 MG per tablet   Commonly known as:  PERCOCET   Dose:  1-2 tablet   Quantity:  30 tablet        Take 1-2 tablets by mouth every 8 hours as needed for pain   Refills:  0        polyethylene glycol powder   Commonly known as:  MIRALAX/GLYCOLAX   Dose:  17 g   Quantity:  510 g        Take 17 g by mouth daily as needed for constipation   Refills:  3        PRISTIQ PO   Dose:  100 mg        Take 100 mg by mouth daily   Refills:  0        SUMATRIPTAN SUCCINATE PO   Dose:  50 mg        Take 50 mg by mouth once as needed for migraine   Refills:  0        TOPIRAMATE PO        Take 50 mg by mouth in the morning and 100 mg by mouth in the evening   Refills:  0        TYLENOL PO   Dose:  1000 mg        Take 1,000 mg by mouth every 6 hours as needed   Refills:  0        VITAMIN D3 PO   Dose:  5000 Units        Take 5,000 Units by mouth daily   Refills:  0        * Notice:  This list has 4 medication(s) that are the same as other medications prescribed for you. Read the directions carefully, and ask your doctor or other care provider to review them with you.            Procedures and tests performed during your visit     CBC with platelets differential    CT Abdomen Pelvis w IV contrast only (Trauma/AAA)    Comprehensive metabolic panel    Lactic acid whole blood    Peripheral IV catheter    hCG qual urine POCT      Orders Needing Specimen Collection     None      Pending Results     No orders found from 1/15/2017 to 1/17/2017.            Pending Culture Results     No orders found from 1/15/2017 to 1/17/2017.            Thank you for choosing [facility]       Thank you for choosing [facility] for your care. Our goal is always to provide you with excellent care. Hearing back from our patients is one way we can continue to improve our services. Please take a few minutes to complete the written survey that you may receive in the mail after you  visit with us. Thank you!        [employer] Information     [employer] gives you secure access to your electronic health record. If you see a primary care provider, you can also send messages to your care team and make appointments. If you have questions, please call your primary care clinic.  If you do not have a primary care provider, please call [phone] and they will assist you.        Care EveryWhere ID     This is your Care EveryWhere ID. This could be used by other organizations to access your [place] medical records  [id number]        After Visit Summary       This is your record. Keep this with you and show to your community pharmacist(s) and doctor(s) at your next visit.

## 2017-01-16 NOTE — DISCHARGE INSTRUCTIONS
Please make an appointment to follow up with Your Primary Care Provider in 5-7 days        *Abdominal Pain, Unknown Cause (Female)    The exact cause of your abdominal (stomach) pain is not certain. This does not mean that this is something to worry about, or the right tests were not done. Everyone likes to know the exact cause of the problem, but sometimes with abdominal pain, there is no clear-cut cause, and this could be a good thing. The good news is that your symptoms can be treated, and you will feel better.   Your condition does not seem serious now; however, sometimes the signs of a serious problem may take more time to appear. For this reason, it is important for you to watch for any new symptoms, problems, or worsening of your condition.  Over the next few days, the abdominal pain may come and go, or be continuous. Other common symptoms can include nausea and vomiting. Sometimes it can be difficult to tell if you feel nauseous, you may just feel bad and not associate that feeling with nausea. Constipation, diarrhea, and a fever may go along with the pain.  The pain may continue even if treated correctly over the following days. Depending on how things go, sometimes the cause can become clear and may require further or different treatment. Additional evaluations, medications, or tests may be needed.  Home care  Your health care provider may prescribe medications for pain, symptoms, or an infection.  Follow the health care provider's instructions for taking these medications.  General care    Rest until your next exam. No strenuous activities.    Try to find positions that ease discomfort. A small pillow placed on the abdomen may help relieve pain.    Something warm on your abdomen (such as a heating pad) may help, but be careful not to burn yourself.  Diet    Do not force yourself to eat, especially if having cramps, vomiting, or diarrhea.    Water is important so you do not get dehydrated. Soup may also be  good. Sports drinks may also help, especially if they are not too acidic. Make sure you don't drink sugary drinks as this can make things worse. Take liquids in small amounts. Do not guzzle them.    Caffeine sometimes makes the pain and cramping worse.    Avoid dairy products if you have vomiting or diarrhea.    Don't eat large amounts at a time. Wait a few minutes between bites.    Eat a diet low in fiber (called a low-residue diet). Foods allowed include refined breads, white rice, fruit and vegetable juices without pulp, tender meats. These foods will pass more easily through the intestine.    Avoid fried or fatty foods, dairy, alcohol and spicy foods until your symptoms go away.  Follow-up care  Follow up with your health care provider as instructed, or if your pain does not begin to improve in the next 24 hours.  When to seek medical care  Seek prompt medical care if any of the following occur:    Pain gets worse or moves to the right lower abdomen    New or worsening vomiting or diarrhea    Swelling of the abdomen    Unable to pass stool for more than three days    New fever over 101  F (38.3 C), or rising fever    Blood in vomit or bowel movements (dark red or black color)    Jaundice (yellow color of eyes and skin)    Weakness, dizziness    Chest, arm, back, neck or jaw pain    Unexpected vaginal bleeding or missed period  Call 911  Call emergency services if any of the following occur:    Trouble breathing    Confusion    Fainting or loss of consciousness    Rapid heart rate    Seizure    [id number] [facility], [address] [place] [address], [place] [address]. All rights reserved. This information is not intended as a substitute for professional medical care. Always follow your healthcare professional's instructions.

## 2017-01-16 NOTE — ED PROVIDER NOTES
History     Chief Complaint   Patient presents with     Fall     Abdominal Pain     HPI  Nevin Alvarado is a 26 year old female with a history of ovarian cysts, endometriosis, depression, anxiety, BPD, who is 2 days status post foreign body removal from the rectum on 1/10/2017, who presents for evaluation of abdominal pain after a fall. As above, the patient had admission here 1/10-1/13/2017 under Colorectal Surgery for rectal foreign body removal, which appears to be her third ER visit for such in the past few months. Today, the patient reports that she was supposed to use her walker at home by the order of her GI surgeon, but decided to walk independently without it to her bedroom, stabilizing herself by leaning against walls. She states that she slipped on a shoe on the floor and fell onto her right side. Since then, she has had persisting abdominal pain and is concerned that stitches from her surgery 6 days ago might have become dislodged. The patient reports that she did not hit her head. She denies any chest pain or vomiting, but does endorse nausea that has been ongoing. She has been taking Percocet for pain but did not take this tonight. The patient reports that she has not put a foreign object in her rectum since her procedure. She states that she has not used the commode since her fall but has not had hematuria or rectal bleeding that she knows of.    I have reviewed the Medications, Allergies, Past Medical and Surgical History, and Social History in the Epic system.    Current Facility-Administered Medications   Medication     lidocaine 1 % 1 mL     lidocaine (LMX4) kit     sodium chloride (PF) 0.9% PF flush 3 mL     sodium chloride (PF) 0.9% PF flush 3 mL     0.9% sodium chloride infusion     ondansetron (ZOFRAN) injection 4 mg     Current Outpatient Prescriptions   Medication     lidocaine (LIDODERM) 5 % Patch     polyethylene glycol (MIRALAX/GLYCOLAX) powder     oxyCODONE-acetaminophen  (PERCOCET) 5-325 MG per tablet     SUMATRIPTAN SUCCINATE PO     TOPIRAMATE PO     BUSPIRONE HCL PO     BUSPIRONE HCL PO     ibuprofen (ADVIL/MOTRIN) 200 MG tablet     ondansetron (ZOFRAN) 4 MG tablet     Desvenlafaxine Succinate (PRISTIQ PO)     Acetaminophen (TYLENOL PO)     Cholecalciferol (VITAMIN D3 PO)     order for DME     order for DME     Past Medical History   Diagnosis Date     Migraine without aura      no known triggers; on Topamax bid and Imitrex PRN     Depression      Borderline personality disorder      Anxiety      H/O self-harm      ADD (attention deficit disorder)      PTSD (post-traumatic stress disorder)      Anorexia nervosa with bulimia      history of; on Topamax     Morbid obesity (H)      Lives in independent group home      due to debilitating mental illness       Past Surgical History   Procedure Laterality Date     Mammoplasty reduction Bilateral      Release carpal tunnel Bilateral      Knee surgery Right      removed a small tissue mass from knee     Head & neck surgery  age 7     lymph nodes removed from neck; benign     Laparoscopic ablation endometriosis       Hc remove fecal impaction or fb w anesthesia N/A 12/18/2016     Procedure: REMOVE FECAL IMPACTION/FOREIGN BODY UNDER ANESTHESIA;  Surgeon: Soham Cano MD;  Location: [facility] OR     Exam under anesthesia anus N/A 1/10/2017     Procedure: EXAM UNDER ANESTHESIA ANUS;  Surgeon: Annmarie Haynes MD;  Location: [facility] OR     Laparotomy exploratory N/A 1/10/2017     Procedure: LAPAROTOMY EXPLORATORY;  Surgeon: Annmarie Haynes MD;  Location: [facility] OR     Sigmoidoscopy flexible N/A 1/10/2017     Procedure: SIGMOIDOSCOPY FLEXIBLE;  Surgeon: Annmarie Haynes MD;  Location: [facility] OR       Family History   Problem Relation Age of Onset     Type 2 Diabetes Maternal Grandmother      Type 2 Diabetes Paternal Grandmother      Myocardial Infarction No family hx of      CEREBROVASCULAR DISEASE Father 53     Coronary Artery  "Disease Early Onset No family hx of      Breast Cancer Paternal Grandmother      Ovarian Cancer No family hx of      Colon Cancer No family hx of        Social History   Substance Use Topics     Smoking status: Never Smoker      Smokeless tobacco: Never Used     Alcohol Use: No        Allergies   Allergen Reactions     Ancef [Cefazolin Sodium]      Augmentin Swelling     Blood-Group Specific Substance Other (See Comments)     Patient has an anti-Cw and non-specific antibodies. Blood product orders may be delayed. Draw one red top and two purple top tubes for all type/screen/crossmatch orders.     Hydrocodone Nausea and Vomiting     vomiting for days     Influenza Vaccines Other (See Comments)     Oseltamivir Hives     med stopped, PN: med stopped     Vicodin [Hydrocodone-Acetaminophen] Nausea and Vomiting     Cephalosporins Rash       Review of Systems   Constitutional: Negative for fever.   Respiratory: Negative for shortness of breath.    Cardiovascular: Negative for chest pain.   Gastrointestinal: Positive for nausea and abdominal pain. Negative for vomiting and anal bleeding.   Genitourinary: Negative for hematuria.   All other systems reviewed and are negative.      Physical Exam   BP: 124/80 mmHg  Pulse: 95  Temp: 97.5  F (36.4  C)  Resp: 14  Height: 157.5 cm (5' 2\")  SpO2: 97 %  Physical Exam  Vital Signs and Nursing Notes Reviewed.  General:  NAD  HEENT: Oropharynx clear.  No obvious signs of trauma.  PERRL.  EOMI  Neck: Supple.  No lymphadenopathy.  Cardiac: RRR.  No murmurs, rubs or gallops  Lungs: Clear bilaterally.  Normal respiratory rate.    Abdomen:  Soft.  Diffusely tender.  NO rebound or guarding.   Back: No CVA tenderness.  Skin:  No rash.  No diaphoresis  Extremities:  No cyanosis, clubbing, or edema.  Neurological:  CN II-XII intact, Strength intact, Sensation intact, No pronator drift, Normal gait.  Pulse:  Symmetric in bilateral upper and lower extremities.      ED Course   Procedures       " 2:00 AM  The patient was seen and examined by Efraín Macedo MD, in Room 19.        Critical Care time:  none               Labs Ordered and Resulted from Time of ED Arrival Up to the Time of Departure from the ED   CBC WITH PLATELETS DIFFERENTIAL - Abnormal; Notable for the following:     Nucleated RBCs 1 (*)     All other components within normal limits   COMPREHENSIVE METABOLIC PANEL - Abnormal; Notable for the following:     Chloride 110 (*)     Glucose 102 (*)     Calcium 8.4 (*)     Bilirubin Total 0.1 (*)      (*)     AST 83 (*)     All other components within normal limits   HCG QUAL URINE POCT - Normal   LACTIC ACID WHOLE BLOOD   PERIPHERAL IV CATHETER       Assessments & Plan (with Medical Decision Making)  26 year old who presents with abdominal pain after fall.  To get a CT scan to look for possible bleed so she given her recent surgery.  This is normal.  Labs are otherwise unremarkable.  Patient's given IV fluids and IV Dilaudid with improvement of her symptoms.  She will be discharged home.  She will follow-up with her surgeon as scheduled.  She will follow-up with the primary this week if not improving.  She will return for worsening symptoms.       I have reviewed the nursing notes.    I have reviewed the findings, diagnosis, plan and need for follow up with the patient.    New Prescriptions    No medications on file       Final diagnoses:   Abdominal pain, generalized   Fall, initial encounter     I, Lamont Marina, am serving as a trained medical scribe to document services personally performed by Efraín Macedo MD, based on the provider's statements to me.      Efraín MURRAY MD, was physically present and have reviewed and verified the accuracy of this note documented by Lamont Marina.    1/16/2017   [facility], EMERGENCY DEPARTMENT      Efraín Macedo MD  01/16/17 0721

## 2017-01-16 NOTE — ED AVS SNAPSHOT
[facility], [place], Emergency Department    [address] [place] [address]    Phone:  [phone]                                       Nevin Alvarado   MRN: [id number]    Department:  [facility], Emergency Department   Date of Visit:  1/16/2017           After Visit Summary Signature Page     I have received my discharge instructions, and my questions have been answered. I have discussed any challenges I see with this plan with the nurse or doctor.    ..........................................................................................................................................  Patient/Patient Representative Signature      ..........................................................................................................................................  Patient Representative Print Name and Relationship to Patient    ..................................................               ................................................  Date                                            Time    ..........................................................................................................................................  Reviewed by Signature/Title    ...................................................              ..............................................  Date                                                            Time

## 2017-01-16 NOTE — ED NOTES
"Pt requesting RN to look at \"sores\" in mouth.    Top lip has small area of puffiness. No sores noted. Pt states she noticed the swelling after eating some ice chips. Pt denies need for MD to evaluate.  "

## 2017-01-16 NOTE — PROGRESS NOTES
Clinic Care Coordination       Patient was seen in ED. Patient has frequent ED visits. Patient is a restricted patient and has multiple community resources. Patient lives in group home.     Clinic care coordination is not indicated     Selena Santamaria RN, CCM - Care Coordinator     1/16/2017    10:14 AM  [phone]

## 2017-01-17 ENCOUNTER — CARE COORDINATION (OUTPATIENT)
Dept: SURGERY | Facility: CLINIC | Age: 27
End: 2017-01-17

## 2017-01-17 ENCOUNTER — TELEPHONE (OUTPATIENT)
Dept: SURGERY | Facility: CLINIC | Age: 27
End: 2017-01-17

## 2017-01-17 NOTE — PROGRESS NOTES
Attempted to reach pt d/t recent dc from hospital and ED visit yesterday.  Had to lv VM msg.  Supplied our office # to sched f/u appt and/or talk to nurse.  Also let her know our  will be in touch to sched post hosp appt.

## 2017-01-17 NOTE — PROGRESS NOTES
Touched base with pt re f/u with PCP and her therapist.  She states she has therapist appt tomorrow and hasn't yet scheduled with PCP but will do so.

## 2017-01-17 NOTE — TELEPHONE ENCOUNTER
Per task, I called Nevin and set up her post ops. I confirmed times, dates, location and providers. She asked if there was a list she could be added to Dr. Haynes and I told her that I would let her know if anything opened up before then.

## 2017-01-19 ENCOUNTER — TELEPHONE (OUTPATIENT)
Dept: SURGERY | Facility: CLINIC | Age: 27
End: 2017-01-19

## 2017-01-19 NOTE — TELEPHONE ENCOUNTER
Patient left a message returning my call, requesting a call back.  Called and spoke with patient.  Patient confirms new appointment time for Monday.

## 2017-01-19 NOTE — TELEPHONE ENCOUNTER
Patient's appointment on Monday has changed to 4:30 pm due to an accidental overbook.  Called patient and left a message with new appointment time.  Asked patient to confirm.  Provided my direct number.

## 2017-01-20 ENCOUNTER — TELEPHONE (OUTPATIENT)
Dept: INTERNAL MEDICINE | Facility: CLINIC | Age: 27
End: 2017-01-20

## 2017-01-20 DIAGNOSIS — G89.18 ACUTE POST-OPERATIVE PAIN: Primary | ICD-10-CM

## 2017-01-20 DIAGNOSIS — K59.03 DRUG-INDUCED CONSTIPATION: ICD-10-CM

## 2017-01-20 RX ORDER — AMOXICILLIN 250 MG
2 CAPSULE ORAL DAILY
Qty: 60 TABLET | Refills: 1 | Status: SHIPPED
Start: 2017-01-20 | End: 2017-02-07

## 2017-01-20 RX ORDER — OXYCODONE AND ACETAMINOPHEN 5; 325 MG/1; MG/1
1-2 TABLET ORAL EVERY 8 HOURS PRN
Qty: 30 TABLET | Refills: 0 | Status: SHIPPED | OUTPATIENT
Start: 2017-01-20 | End: 2017-02-07

## 2017-01-20 NOTE — TELEPHONE ENCOUNTER
Dr. Ramos, please advise.     Homecare nurse & pt are calling to give update.    1/10--pt had colon-rectal surgery done.   1/13--Pt was discharged from hospital.   1/15--1st home care visit.   17th--doing ok. She is sore. colenrectal does not feel appt is needed.  Today (1/20) --- pt is complaining of pain with incicion, she fell Sunday PM. Went to ER because she couldn't get up. CT of abdomen was done and everything was fine.   There is A bump on the right side of abdomen about 1-2 inches from wound. Tender to touch, some swelling.   Incision is Pink, not red, skin is not warm.   ---Pt is requesting refill of  oxyCODONE-acetaminophen (PERCOCET) 5-325 MG per tablet  For pain that she is having. She is out of pills. She takes 2 tablets Q 8 hours. When Laying down pain is rated 5/10, and when up and active pain is 9/10.  She will pick-up written RX at clinic.    Also, pt says she has had 2 stools since being home 1 week ago. Last BM was 1/17, 3 days ago. Per pt Stool was hard, and hard to pass BM. Pt Is only taking 1 capful of miralax per day. Please advise on additional medication for constipation. Pharmacy is pending. Pt has tried increasing fiber in diet. Drinking fluids.     She is seeing the colon-rectal surgeon on Monday.

## 2017-01-20 NOTE — TELEPHONE ENCOUNTER
Refill of Percocet printed - patient may .    She should minimize her use of this medication. No additional refills will be provided unless her surgeon conveys a continued need for this medication after seeing her on Monday.     Pericolace sent to pharmacy - 2 tabs daily.     Not sure how much a capful of Miralax represents. Should be taking 17g daily.

## 2017-01-21 ENCOUNTER — APPOINTMENT (OUTPATIENT)
Dept: CT IMAGING | Facility: CLINIC | Age: 27
End: 2017-01-21
Attending: FAMILY MEDICINE
Payer: MEDICAID

## 2017-01-21 ENCOUNTER — HOSPITAL ENCOUNTER (EMERGENCY)
Facility: CLINIC | Age: 27
Discharge: HOME | End: 2017-01-21
Attending: FAMILY MEDICINE | Admitting: FAMILY MEDICINE
Payer: MEDICAID

## 2017-01-21 VITALS
TEMPERATURE: 98.7 F | DIASTOLIC BLOOD PRESSURE: 69 MMHG | HEART RATE: 92 BPM | WEIGHT: 228 LBS | SYSTOLIC BLOOD PRESSURE: 107 MMHG | BODY MASS INDEX: 41.69 KG/M2 | RESPIRATION RATE: 16 BRPM | OXYGEN SATURATION: 96 %

## 2017-01-21 DIAGNOSIS — R10.84 ABDOMINAL PAIN, GENERALIZED: ICD-10-CM

## 2017-01-21 LAB
ANION GAP SERPL CALCULATED.3IONS-SCNC: 10 MMOL/L (ref 3–14)
BASOPHILS # BLD AUTO: 0 10E9/L (ref 0–0.2)
BASOPHILS NFR BLD AUTO: 0.3 %
BUN SERPL-MCNC: 13 MG/DL (ref 7–30)
CALCIUM SERPL-MCNC: 8.9 MG/DL (ref 8.5–10.1)
CHLORIDE SERPL-SCNC: 110 MMOL/L (ref 94–109)
CO2 SERPL-SCNC: 23 MMOL/L (ref 20–32)
CREAT SERPL-MCNC: 0.61 MG/DL (ref 0.52–1.04)
DIFFERENTIAL METHOD BLD: NORMAL
EOSINOPHIL # BLD AUTO: 0.3 10E9/L (ref 0–0.7)
EOSINOPHIL NFR BLD AUTO: 4.6 %
ERYTHROCYTE [DISTWIDTH] IN BLOOD BY AUTOMATED COUNT: 13.6 % (ref 10–15)
GFR SERPL CREATININE-BSD FRML MDRD: ABNORMAL ML/MIN/1.7M2
GLUCOSE SERPL-MCNC: 95 MG/DL (ref 70–99)
HCT VFR BLD AUTO: 38.6 % (ref 35–47)
HGB BLD-MCNC: 12.7 G/DL (ref 11.7–15.7)
IMM GRANULOCYTES # BLD: 0 10E9/L (ref 0–0.4)
IMM GRANULOCYTES NFR BLD: 0.1 %
LYMPHOCYTES # BLD AUTO: 1 10E9/L (ref 0.8–5.3)
LYMPHOCYTES NFR BLD AUTO: 13.7 %
MCH RBC QN AUTO: 29.7 PG (ref 26.5–33)
MCHC RBC AUTO-ENTMCNC: 32.9 G/DL (ref 31.5–36.5)
MCV RBC AUTO: 90 FL (ref 78–100)
MONOCYTES # BLD AUTO: 0.5 10E9/L (ref 0–1.3)
MONOCYTES NFR BLD AUTO: 7.8 %
NEUTROPHILS # BLD AUTO: 5.1 10E9/L (ref 1.6–8.3)
NEUTROPHILS NFR BLD AUTO: 73.5 %
NRBC # BLD AUTO: 0 10*3/UL
NRBC BLD AUTO-RTO: 0 /100
PLATELET # BLD AUTO: 285 10E9/L (ref 150–450)
POTASSIUM SERPL-SCNC: 3.7 MMOL/L (ref 3.4–5.3)
RBC # BLD AUTO: 4.28 10E12/L (ref 3.8–5.2)
SODIUM SERPL-SCNC: 143 MMOL/L (ref 133–144)
WBC # BLD AUTO: 6.9 10E9/L (ref 4–11)

## 2017-01-21 PROCEDURE — 99284 EMERGENCY DEPT VISIT MOD MDM: CPT | Mod: Z6 | Performed by: FAMILY MEDICINE

## 2017-01-21 PROCEDURE — 85025 COMPLETE CBC W/AUTO DIFF WBC: CPT | Performed by: FAMILY MEDICINE

## 2017-01-21 PROCEDURE — 36415 COLL VENOUS BLD VENIPUNCTURE: CPT

## 2017-01-21 PROCEDURE — 25000132 ZZH RX MED GY IP 250 OP 250 PS 637: Performed by: FAMILY MEDICINE

## 2017-01-21 PROCEDURE — 99284 EMERGENCY DEPT VISIT MOD MDM: CPT | Mod: 25

## 2017-01-21 PROCEDURE — 74176 CT ABD & PELVIS W/O CONTRAST: CPT

## 2017-01-21 PROCEDURE — 80048 BASIC METABOLIC PNL TOTAL CA: CPT | Performed by: FAMILY MEDICINE

## 2017-01-21 RX ORDER — LIDOCAINE 50 MG/G
2 PATCH TOPICAL
Status: DISCONTINUED | OUTPATIENT
Start: 2017-01-21 | End: 2017-01-22 | Stop reason: HOSPADM

## 2017-01-21 RX ORDER — OXYCODONE AND ACETAMINOPHEN 5; 325 MG/1; MG/1
2 TABLET ORAL ONCE
Status: COMPLETED | OUTPATIENT
Start: 2017-01-21 | End: 2017-01-21

## 2017-01-21 RX ORDER — OXYCODONE HYDROCHLORIDE 5 MG/1
5 TABLET ORAL ONCE
Status: COMPLETED | OUTPATIENT
Start: 2017-01-21 | End: 2017-01-21

## 2017-01-21 RX ADMIN — LIDOCAINE 2 PATCH: 50 PATCH CUTANEOUS at 20:51

## 2017-01-21 RX ADMIN — OXYCODONE HYDROCHLORIDE 5 MG: 5 TABLET ORAL at 20:15

## 2017-01-21 RX ADMIN — OXYCODONE HYDROCHLORIDE AND ACETAMINOPHEN 2 TABLET: 5; 325 TABLET ORAL at 18:08

## 2017-01-21 ASSESSMENT — ENCOUNTER SYMPTOMS: ABDOMINAL PAIN: 1

## 2017-01-21 NOTE — ED PROVIDER NOTES
History     Chief Complaint   Patient presents with     Incisional Pain     per report pt had surgery on the 10th to remove the object that pt inserted in her rectum. Today her pain is getting worse.      HPI  Nevin Alvarado is a 26 year old female who presents to the Emergency Department with incisional pain. She states that on 1/10/17 she had an exploratory laparotomy and extraction of foreign body. She states that she is having pain in her abdomen at the incision site. Patient states that she had a fall 7 days and went to the ER on 1/16/2017 after this.  She was evaluated that day with a CT scan, the results of which were unremarkable.  Pain is grown progressively worse since that episode.  She reports that she has not taken her 2 percocet Q8 hours as prescribed because of the pain.  Has not had any pain medication today.  No fever or chills, urinary symptoms, change in bowel habit, or other complaints.    CT abdomen/pelvis from 1/16/2017:  IMPRESSION:  Interval removal of large rectal foreign body with  postsurgical changes and no acute intra-abdominal pathology.    I have reviewed the Medications, Allergies, Past Medical and Surgical History, and Social History in the [employer] system.    PAST MEDICAL HISTORY  Past Medical History   Diagnosis Date     Migraine without aura      no known triggers; on Topamax bid and Imitrex PRN     Depression      Borderline personality disorder      Anxiety      H/O self-harm      ADD (attention deficit disorder)      PTSD (post-traumatic stress disorder)      Anorexia nervosa with bulimia      history of; on Topamax     Morbid obesity (H)      Lives in independent group home      due to debilitating mental illness     PAST SURGICAL HISTORY  Past Surgical History   Procedure Laterality Date     Mammoplasty reduction Bilateral      Release carpal tunnel Bilateral      Knee surgery Right      removed a small tissue mass from knee     Head & neck surgery  age 7     lymph nodes  removed from neck; benign     Laparoscopic ablation endometriosis       Hc remove fecal impaction or fb w anesthesia N/A 12/18/2016     Procedure: REMOVE FECAL IMPACTION/FOREIGN BODY UNDER ANESTHESIA;  Surgeon: Soham Cano MD;  Location: [facility] OR     Exam under anesthesia anus N/A 1/10/2017     Procedure: EXAM UNDER ANESTHESIA ANUS;  Surgeon: Annmarie Haynes MD;  Location: [facility] OR     Laparotomy exploratory N/A 1/10/2017     Procedure: LAPAROTOMY EXPLORATORY;  Surgeon: Annmarie Haynes MD;  Location: [facility] OR     Sigmoidoscopy flexible N/A 1/10/2017     Procedure: SIGMOIDOSCOPY FLEXIBLE;  Surgeon: Annmarie Haynes MD;  Location: [facility] OR     FAMILY HISTORY  Family History   Problem Relation Age of Onset     Type 2 Diabetes Maternal Grandmother      Type 2 Diabetes Paternal Grandmother      Myocardial Infarction No family hx of      CEREBROVASCULAR DISEASE Father 53     Coronary Artery Disease Early Onset No family hx of      Breast Cancer Paternal Grandmother      Ovarian Cancer No family hx of      Colon Cancer No family hx of      SOCIAL HISTORY  Social History   Substance Use Topics     Smoking status: Never Smoker      Smokeless tobacco: Never Used     Alcohol Use: No     MEDICATIONS  Current Facility-Administered Medications   Medication     lidocaine (LIDODERM) 5 % Patch 2 patch    And     [START ON 1/22/2017] lidocaine (LIDODERM) patch REMOVAL    And     lidocaine (LIDODERM) Patch in Place     Current Outpatient Prescriptions   Medication     oxyCODONE-acetaminophen (PERCOCET) 5-325 MG per tablet     TOPIRAMATE PO     BUSPIRONE HCL PO     ibuprofen (ADVIL/MOTRIN) 200 MG tablet     Desvenlafaxine Succinate (PRISTIQ PO)     Cholecalciferol (VITAMIN D3 PO)     senna-docusate (SENOKOT-S;PERICOLACE) 8.6-50 MG per tablet     order for DME     order for DME     lidocaine (LIDODERM) 5 % Patch     polyethylene glycol (MIRALAX/GLYCOLAX) powder     SUMATRIPTAN SUCCINATE PO     BUSPIRONE HCL PO      ondansetron (ZOFRAN) 4 MG tablet     Acetaminophen (TYLENOL PO)     ALLERGIES  Allergies   Allergen Reactions     Ancef [Cefazolin Sodium]      Augmentin Swelling     Blood-Group Specific Substance Other (See Comments)     Patient has an anti-Cw and non-specific antibodies. Blood product orders may be delayed. Draw one red top and two purple top tubes for all type/screen/crossmatch orders.     Hydrocodone Nausea and Vomiting     vomiting for days     Influenza Vaccines Other (See Comments)     Oseltamivir Hives     med stopped, PN: med stopped     Vicodin [Hydrocodone-Acetaminophen] Nausea and Vomiting     Cephalosporins Rash        Review of Systems   Gastrointestinal: Positive for abdominal pain.   All other systems reviewed and are negative.      Physical Exam   BP: 120/69 mmHg  Pulse: 100  Temp: 98.6  F (37  C)  Resp: 18  Weight: 103.42 kg (228 lb)  SpO2: 96 %  Physical Exam   Constitutional: She appears distressed (tearful, emotionally upset).   HENT:   Head: Atraumatic.   Mouth/Throat: Oropharynx is clear and moist. No oropharyngeal exudate.   Eyes: Pupils are equal, round, and reactive to light. No scleral icterus.   Cardiovascular: Normal heart sounds and intact distal pulses.    Pulmonary/Chest: Breath sounds normal. No respiratory distress.   Abdominal: Soft. Bowel sounds are normal. There is generalized tenderness. There is no rigidity, no rebound, no guarding and no CVA tenderness.       Musculoskeletal: She exhibits no edema or tenderness.   Skin: Skin is warm. No rash noted. She is not diaphoretic.       ED Course     [unfilled]  Procedures             Critical Care time:  none               Labs Ordered and Resulted from Time of ED Arrival Up to the Time of Departure from the ED   BASIC METABOLIC PANEL - Abnormal; Notable for the following:     Chloride 110 (*)     All other components within normal limits   CBC WITH PLATELETS DIFFERENTIAL       Assessments & Plan (with Medical Decision Making)    Tissue with extensive medical and psychiatric history presents with pain near her recent surgical incision.  Was an episode of trauma 5 days ago with negative workup and previous emergency department evaluation.  She has been slowly decreasing her pain management medications and did not take them today.  Though my differential includes wound infection, abdominal wall cellulitis, abdominal wall hernia, small bowel obstruction, intra-abdominal complication such as dehiscence or abscess, I do not find any exam, lab or radiographic evidence of any the above serious surgical complications.  The patient was treated with her regular oral pain medications and the case discussed with the colorectal surgery service.    Based on a negative clinical evaluation a day as well as her normal diagnostic studies it appears she is safe to proceed further for outpatient observation and management.  She has close follow-up scheduled with her primary doctor and already has a follow-up appointment at the colorectal clinic.  There was a suggestion given by the surgical service to try topical lidocaine patches and so 2 were placed here in the emergency department.  Patient continues to have a benign exam and appears stable for discharge.  Addendum: At one point she did asked to see the mental health .  Upon further questioning she tells me she is having a lot of stress, she is not suicidal.  She does have outpatient therapy and a DBT program.  She lives at an assisted living facility.  She does not feel unsafe.  She does not appear to meet any grounds for inpatient mental health admission nor does it appear she would benefit at this time from a mental health admission.  She will be discharged to follow-up with her outpatient mental providers.    I have reviewed the nursing notes.    I have reviewed the findings, diagnosis, plan and need for follow up with the patient.    New Prescriptions    No medications on file       Final  diagnoses:   Abdominal pain, generalized     I, Siddharth Doss, am serving as a trained medical scribe to document services personally performed by Barney Garcia MD, based on the provider's statements to me.      IBarney MD, was physically present and have reviewed and verified the accuracy of this note documented by Siddharth Doss.    1/21/2017   [facility], [place], EMERGENCY DEPARTMENT      Barney Garcia MD  01/21/17 0315

## 2017-01-21 NOTE — ED AVS SNAPSHOT
[facility], Emergency Department    [address]    [place] [address]    Phone:  [phone]    Fax:  [phone]                                       Nevin Alvarado   MRN: [id number]    Department:  [facility], Emergency Department   Date of Visit:  1/21/2017           Patient Information     Date Of Birth          1990        Your diagnoses for this visit were:     Abdominal pain, generalized        You were seen by Barney Garcia MD.        Discharge Instructions       Thank you for choosing [facility].     Please closely monitor for further symptoms. Return to the Emergency Department if you develop any new or worsening signs or symptoms.    If you received any opiate pain medications or sedatives during your visit, please do not drive for at least 8 hours.     Labs, cultures or final xray interpretations may still need to be reviewed.  We will call you if your plan of care needs to be changed.    Please follow up with your primary care physician or clinic this week as scheduled, and with colorectal surgery as soon as possible.      Future Appointments        Provider Department Dept Phone Center    1/23/2017 4:30 PM [facility] Mont[facility] Colon and Rectal Surgery [phone] [facility]    1/24/2017 1:30 PM Sandra Ramos MD [facility] [phone]     2/9/2017 4:15 PM Annmarie Haynes MD [facility] Colon and Rectal Surgery [phone] [facility]      24 Hour Appointment Hotline       To make an appointment at any [facility], call [phone] ([phone]). If you don't have a family doctor or clinic, we will help you find one. [facility] are conveniently located to serve the needs of you and your family.             Review of your medicines      Our records show that you are taking the medicines listed below. If these are incorrect, please call your family doctor or clinic.         Dose / Directions Last dose taken    * BUSPIRONE HCL PO   Dose:  5 mg        Take 5 mg by mouth every morning   Refills:  0        * BUSPIRONE HCL PO   Dose:  10 mg        Take 10 mg by mouth every evening   Refills:  0        ibuprofen 200 MG tablet   Commonly known as:  ADVIL/MOTRIN   Dose:  600 mg   Quantity:  100 tablet        Take 3 tablets (600 mg) by mouth every 6 hours as needed for other (Headache)   Refills:  0        lidocaine 5 % Patch   Commonly known as:  LIDODERM   Dose:  1 patch   Quantity:  30 patch        Place 1 patch onto the skin every 24 hours May substitute over the counter 4% lidocaine patches instead. Place on abdomen for incisional pain.   Refills:  3        ondansetron 4 MG tablet   Commonly known as:  ZOFRAN   Dose:  4 mg   Quantity:  8 tablet        Take 1 tablet (4 mg) by mouth every 6 hours   Refills:  0        * order for DME   Quantity:  1 each        Equipment being ordered: Other: shower chair Treatment Diagnosis: s/p exploratory laparotomy for rectal foreign body   Refills:  0        * order for DME   Quantity:  1 Units        Equipment being ordered: Walker Wheels (), Walker () and Bath Seat () Treatment Diagnosis: unstable gait   Refills:  0        oxyCODONE-acetaminophen 5-325 MG per tablet   Commonly known as:  PERCOCET   Dose:  1-2 tablet   Quantity:  30 tablet        Take 1-2 tablets by mouth every 8 hours as needed for pain   Refills:  0        polyethylene glycol powder   Commonly known as:  MIRALAX/GLYCOLAX   Dose:  17 g   Quantity:  510 g        Take 17 g by mouth daily as needed for constipation   Refills:  3        PRISTIQ PO   Dose:  100 mg        Take 100 mg by mouth daily   Refills:  0        senna-docusate 8.6-50 MG per tablet   Commonly known as:  SENOKOT-S;PERICOLACE   Dose:  2 tablet   Quantity:  60 tablet        Take 2 tablets by mouth daily   Refills:  1        SUMATRIPTAN SUCCINATE PO   Dose:  50 mg        Take 50 mg by mouth once as needed for  migraine   Refills:  0        TOPIRAMATE PO        Take 50 mg by mouth in the morning and 100 mg by mouth in the evening   Refills:  0        TYLENOL PO   Dose:  1000 mg        Take 1,000 mg by mouth every 6 hours as needed   Refills:  0        VITAMIN D3 PO   Dose:  5000 Units        Take 5,000 Units by mouth daily   Refills:  0        * Notice:  This list has 4 medication(s) that are the same as other medications prescribed for you. Read the directions carefully, and ask your doctor or other care provider to review them with you.            Procedures and tests performed during your visit     Abd/pelvis CT no contrast - [facility] Protocol    Basic metabolic panel    CBC with platelets differential      Orders Needing Specimen Collection     None      Pending Results     Date and Time Order Name Status Description    1/21/2017 1945 Abd/pelvis CT no contrast - [facility] Protocol Preliminary             Pending Culture Results     No orders found from 1/20/2017 to 1/22/2017.            Thank you for choosing [facility]       Thank you for choosing [facility] for your care. Our goal is always to provide you with excellent care. Hearing back from our patients is one way we can continue to improve our services. Please take a few minutes to complete the written survey that you may receive in the mail after you visit with us. Thank you!        [employer] Information     [employer] gives you secure access to your electronic health record. If you see a primary care provider, you can also send messages to your care team and make appointments. If you have questions, please call your primary care clinic.  If you do not have a primary care provider, please call [phone] and they will assist you.        Care EveryWhere ID     This is your Care EveryWhere ID. This could be used by other organizations to access your [facility] medical records  [id number]        After Visit Summary       This is your record. Keep this with you and show to your  community pharmacist(s) and doctor(s) at your next visit.

## 2017-01-21 NOTE — ED AVS SNAPSHOT
[facility], Emergency Department    [address]    [facility] [address]    Phone:  [phone]    Fax:  [phone]                                       Nevin Alvarado   MRN: [id number]    Department:  [facility], Emergency Department   Date of Visit:  1/21/2017           After Visit Summary Signature Page     I have received my discharge instructions, and my questions have been answered. I have discussed any challenges I see with this plan with the nurse or doctor.    ..........................................................................................................................................  Patient/Patient Representative Signature      ..........................................................................................................................................  Patient Representative Print Name and Relationship to Patient    ..................................................               ................................................  Date                                            Time    ..........................................................................................................................................  Reviewed by Signature/Title    ...................................................              ..............................................  Date                                                            Time

## 2017-01-22 NOTE — DISCHARGE INSTRUCTIONS
Thank you for choosing [facility].     Please closely monitor for further symptoms. Return to the Emergency Department if you develop any new or worsening signs or symptoms.    If you received any opiate pain medications or sedatives during your visit, please do not drive for at least 8 hours.     Labs, cultures or final xray interpretations may still need to be reviewed.  We will call you if your plan of care needs to be changed.    Please follow up with your primary care physician or clinic this week as scheduled, and with colorectal surgery as soon as possible.

## 2017-01-23 ENCOUNTER — OFFICE VISIT (OUTPATIENT)
Dept: SURGERY | Facility: CLINIC | Age: 27
End: 2017-01-23

## 2017-01-23 ENCOUNTER — DOCUMENTATION ONLY (OUTPATIENT)
Dept: LAB | Facility: CLINIC | Age: 27
End: 2017-01-23

## 2017-01-23 VITALS
OXYGEN SATURATION: 98 % | HEIGHT: 62 IN | SYSTOLIC BLOOD PRESSURE: 129 MMHG | WEIGHT: 228 LBS | TEMPERATURE: 98.9 F | BODY MASS INDEX: 41.96 KG/M2 | DIASTOLIC BLOOD PRESSURE: 75 MMHG | HEART RATE: 70 BPM

## 2017-01-23 DIAGNOSIS — R50.9 FEVER, UNSPECIFIED: ICD-10-CM

## 2017-01-23 DIAGNOSIS — T18.5XXD: ICD-10-CM

## 2017-01-23 DIAGNOSIS — Z09 FOLLOW-UP EXAMINATION FOLLOWING SURGERY: Primary | ICD-10-CM

## 2017-01-23 LAB
ALBUMIN UR-MCNC: NEGATIVE MG/DL
APPEARANCE UR: ABNORMAL
BACTERIA #/AREA URNS HPF: ABNORMAL /HPF
BILIRUB UR QL STRIP: NEGATIVE
COLOR UR AUTO: YELLOW
ERYTHROCYTE [DISTWIDTH] IN BLOOD BY AUTOMATED COUNT: 13.9 % (ref 10–15)
GLUCOSE UR STRIP-MCNC: NEGATIVE MG/DL
HCT VFR BLD AUTO: 42.8 % (ref 35–47)
HGB BLD-MCNC: 13.6 G/DL (ref 11.7–15.7)
HGB UR QL STRIP: NEGATIVE
KETONES UR STRIP-MCNC: NEGATIVE MG/DL
LEUKOCYTE ESTERASE UR QL STRIP: NEGATIVE
MCH RBC QN AUTO: 29.4 PG (ref 26.5–33)
MCHC RBC AUTO-ENTMCNC: 31.8 G/DL (ref 31.5–36.5)
MCV RBC AUTO: 92 FL (ref 78–100)
MUCOUS THREADS #/AREA URNS LPF: PRESENT /LPF
NITRATE UR QL: NEGATIVE
PH UR STRIP: 5 PH (ref 5–7)
PLATELET # BLD AUTO: 241 10E9/L (ref 150–450)
RBC # BLD AUTO: 4.63 10E12/L (ref 3.8–5.2)
RBC #/AREA URNS AUTO: 1 /HPF (ref 0–2)
SP GR UR STRIP: 1.03 (ref 1–1.03)
SQUAMOUS #/AREA URNS AUTO: 4 /HPF (ref 0–1)
URN SPEC COLLECT METH UR: ABNORMAL
UROBILINOGEN UR STRIP-MCNC: 0 MG/DL (ref 0–2)
WBC # BLD AUTO: 2.9 10E9/L (ref 4–11)
WBC #/AREA URNS AUTO: 2 /HPF (ref 0–2)

## 2017-01-23 ASSESSMENT — PAIN SCALES - GENERAL: PAINLEVEL: SEVERE PAIN (7)

## 2017-01-23 NOTE — PROGRESS NOTES
"Colon and Rectal Surgery Postoperative Clinic Note    RE: Nevin Alvarado  : 1990  MOR: 2017    Nevin Alvarado is a very pleasant 26 year old female with a history of borderline personality disorder, anxiety, ADD, PTSD, living in a group home who presented with a rectal foreign body and is status post exam under anesthesia of the anus, flexible sigmoidoscopy, exploratory laparotomy with removal of rectal foreign body. No enterotomy or bowel resection was necessary. She was discharged to home on 2017 and presents today for follow-up.    Interval history: Nevin has been doing okay since returning home but reports developing a fever yesterday. She reports that her temperature got up 202.7. This morning was 98.7 and today in clinic is 99.4. She has some generalized body aches. She has some incisional pain but denies any deep abdominal pain. She is having normal, soft \"balls\" of stool with the use of daily MiraLAX and senna. She has had only 2 runny stools. She is voiding normally but reports this is more than usual and she has occasional pain with voiding. She denies any bleeding or burning with urination. She has mild nausea with her chills but denies any vomiting. She has a decreased appetite but is tolerating food okay. She continues to use oxycodone, Tylenol, and ibuprofen as well as lidocaine patches for pain. She denies any rectal pain or rectal bleeding.    Assessment/Plan:  26 year old female status post exam under anesthesia of the anus, flexible sigmoidoscopy, exploratory laparotomy with removal of rectal foreign body. She has some generalized body aches and chills today. Temp is 99.4 in clinic. Will check CBC and UA as she does have some pain with urination and increased frequency of urination. She plans to follow-up with her primary care provider tomorrow as well and with her psychiatrist on Thursday. Advised her to contact the clinic if she has any temperatures greater than " 101, increasing abdominal pain, vomiting, rectal pain, or any bleeding. She could always go to the emergency room if her symptoms do not improve. Incision is healing well without any signs of infection. Attenuant daily MiraLAX and she can increase this up to 3 times a day as needed to keep her stool soft. Will have her follow up with Dr. Haynes in the next 2-3 weeks as well.  Patient's questions were answered to her stated satisfaction and she is in agreement with this plan.    Medical history:  Past Medical History   Diagnosis Date     Migraine without aura      no known triggers; on Topamax bid and Imitrex PRN     Depression      Borderline personality disorder      Anxiety      H/O self-harm      ADD (attention deficit disorder)      PTSD (post-traumatic stress disorder)      Anorexia nervosa with bulimia      history of; on Topamax     Morbid obesity (H)      Lives in independent group home      due to debilitating mental illness       Surgical history:  Past Surgical History   Procedure Laterality Date     Mammoplasty reduction Bilateral      Release carpal tunnel Bilateral      Knee surgery Right      removed a small tissue mass from knee     Head & neck surgery  age 7     lymph nodes removed from neck; benign     Laparoscopic ablation endometriosis       Hc remove fecal impaction or fb w anesthesia N/A 12/18/2016     Procedure: REMOVE FECAL IMPACTION/FOREIGN BODY UNDER ANESTHESIA;  Surgeon: Soham Cano MD;  Location: [facility]     Exam under anesthesia anus N/A 1/10/2017     Procedure: EXAM UNDER ANESTHESIA ANUS;  Surgeon: Annmarie Haynes MD;  Location: [facility]     Laparotomy exploratory N/A 1/10/2017     Procedure: LAPAROTOMY EXPLORATORY;  Surgeon: Annmarie Haynes MD;  Location: [facility]     Sigmoidoscopy flexible N/A 1/10/2017     Procedure: SIGMOIDOSCOPY FLEXIBLE;  Surgeon: Annmarie Haynes MD;  Location: [facility] OR       Problem list:  Patient Active Problem List    Diagnosis  Date Noted     Rectal foreign body 01/11/2017     Priority: Medium     Migraine without aura      Priority: Medium     no known triggers; on Topamax bid and Imitrex PRN       Depression      Priority: Medium     Borderline personality disorder      Priority: Medium     Anxiety      Priority: Medium     H/O self-harm      Priority: Medium     ADD (attention deficit disorder)      Priority: Medium     PTSD (post-traumatic stress disorder)      Priority: Medium     Morbid obesity (H)      Priority: Medium       Medications:  Current Outpatient Prescriptions   Medication Sig Dispense Refill     oxyCODONE-acetaminophen (PERCOCET) 5-325 MG per tablet Take 1-2 tablets by mouth every 8 hours as needed for pain 30 tablet 0     senna-docusate (SENOKOT-S;PERICOLACE) 8.6-50 MG per tablet Take 2 tablets by mouth daily 60 tablet 1     order for DME Equipment being ordered: Other: shower chair  Treatment Diagnosis: s/p exploratory laparotomy for rectal foreign body 1 each 0     order for DME Equipment being ordered: Walker Wheels (), Walker () and Bath Seat ()  Treatment Diagnosis: unstable gait 1 Units 0     lidocaine (LIDODERM) 5 % Patch Place 1 patch onto the skin every 24 hours May substitute over the counter 4% lidocaine patches instead. Place on abdomen for incisional pain. 30 patch 3     polyethylene glycol (MIRALAX/GLYCOLAX) powder Take 17 g by mouth daily as needed for constipation 510 g 3     SUMATRIPTAN SUCCINATE PO Take 50 mg by mouth once as needed for migraine       TOPIRAMATE PO Take 50 mg by mouth in the morning and 100 mg by mouth in the evening       BUSPIRONE HCL PO Take 5 mg by mouth every morning        BUSPIRONE HCL PO Take 10 mg by mouth every evening       ibuprofen (ADVIL/MOTRIN) 200 MG tablet Take 3 tablets (600 mg) by mouth every 6 hours as needed for other (Headache) 100 tablet      ondansetron (ZOFRAN) 4 MG tablet Take 1 tablet (4 mg) by mouth every 6 hours 8 tablet 0     Desvenlafaxine  "Succinate (PRISTIQ PO) Take 100 mg by mouth daily       Acetaminophen (TYLENOL PO) Take 1,000 mg by mouth every 6 hours as needed        Cholecalciferol (VITAMIN D3 PO) Take 5,000 Units by mouth daily          Allergies:  Allergies   Allergen Reactions     Ancef [Cefazolin Sodium]      Augmentin Swelling     Blood-Group Specific Substance Other (See Comments)     Patient has an anti-Cw and non-specific antibodies. Blood product orders may be delayed. Draw one red top and two purple top tubes for all type/screen/crossmatch orders.     Hydrocodone Nausea and Vomiting     vomiting for days     Influenza Vaccines Other (See Comments)     Oseltamivir Hives     med stopped, PN: med stopped     Vicodin [Hydrocodone-Acetaminophen] Nausea and Vomiting     Cephalosporins Rash       Family history:  Family History   Problem Relation Age of Onset     Type 2 Diabetes Maternal Grandmother      Type 2 Diabetes Paternal Grandmother      Myocardial Infarction No family hx of      CEREBROVASCULAR DISEASE Father 53     Coronary Artery Disease Early Onset No family hx of      Breast Cancer Paternal Grandmother      Ovarian Cancer No family hx of      Colon Cancer No family hx of        Social history:  Social History   Substance Use Topics     Smoking status: Never Smoker      Smokeless tobacco: Never Used     Alcohol Use: No     Marital status: single.    Nursing Notes:   Agatha Ervin LPN  1/23/2017  3:48 PM  Signed  Chief Complaint   Patient presents with     Surgical Followup       Filed Vitals:    01/23/17 1547   BP: 129/75   Pulse: 70   Temp: 98.9  F (37.2  C)   TempSrc: Oral   Height: 5' 2\"   Weight: 228 lb   SpO2: 98%       Body mass index is 41.69 kg/(m^2).      Agatha HAYS LPN                            Physical Examination:  /75 mmHg  Pulse 70  Temp(Src) 98.9  F (37.2  C) (Oral)  Ht 5' 2\"  Wt 228 lb  BMI 41.69 kg/m2  SpO2 98%  General: Alert, oriented, in no acute distress, sitting comfortably in wheelchair  HEENT: " incus membranes moist   Abdomen: obese, soft, nondistended, nontender and palpation. Midline incision well approximated without any erythema or drainage.   Extremities:  warm, dry, no edema     Total face to face time was 30 minutes, >50% counseling.   this is a postoperative visit.     HU Avila, NP-C  Colon and Rectal Surgery  [facility]    This note was created using speech recognition software and may contain unintended word substitutions.

## 2017-01-23 NOTE — NURSING NOTE
"Chief Complaint   Patient presents with     Surgical Followup       Filed Vitals:    01/23/17 1547   BP: 129/75   Pulse: 70   Temp: 98.9  F (37.2  C)   TempSrc: Oral   Height: 5' 2\"   Weight: 228 lb   SpO2: 98%       Body mass index is 41.69 kg/(m^2).      Agatha HAYS LPN                         "

## 2017-01-23 NOTE — Clinical Note
"2017      RE: Nevin Alvarado  [address]  [place] [address]       Colon and Rectal Surgery Postoperative Clinic Note    RE: Nevin Alvarado  : 1990  MOR: 2017    Nevin Alvarado is a very pleasant 26 year old female with a history of borderline personality disorder, anxiety, ADD, PTSD, living in a group home who presented with a rectal foreign body and is status post exam under anesthesia of the anus, flexible sigmoidoscopy, exploratory laparotomy with removal of rectal foreign body. No enterotomy or bowel resection was necessary. She was discharged to home on 2017 and presents today for follow-up.    Interval history: Nevin has been doing okay since returning home but reports developing a fever yesterday. She reports that her temperature got up 202.7. This morning was 98.7 and today in clinic is 99.4. She has some generalized body aches. She has some incisional pain but denies any deep abdominal pain. She is having normal, soft \"balls\" of stool with the use of daily MiraLAX and senna. She has had only 2 runny stools. She is voiding normally but reports this is more than usual and she has occasional pain with voiding. She denies any bleeding or burning with urination. She has mild nausea with her chills but denies any vomiting. She has a decreased appetite but is tolerating food okay. She continues to use oxycodone, Tylenol, and ibuprofen as well as lidocaine patches for pain. She denies any rectal pain or rectal bleeding.    Assessment/Plan:  26 year old female status post exam under anesthesia of the anus, flexible sigmoidoscopy, exploratory laparotomy with removal of rectal foreign body. She has some generalized body aches and chills today. Temp is 99.4 in clinic. Will check CBC and UA as she does have some pain with urination and increased frequency of urination. She plans to follow-up with her primary care provider tomorrow as well and " with her psychiatrist on Thursday. Advised her to contact the clinic if she has any temperatures greater than 101, increasing abdominal pain, vomiting, rectal pain, or any bleeding. She could always go to the emergency room if her symptoms do not improve. Incision is healing well without any signs of infection. Attenuant daily MiraLAX and she can increase this up to 3 times a day as needed to keep her stool soft. Will have her follow up with Dr. Haynes in the next 2-3 weeks as well.  Patient's questions were answered to her stated satisfaction and she is in agreement with this plan.    Medical history:  Past Medical History   Diagnosis Date     Migraine without aura      no known triggers; on Topamax bid and Imitrex PRN     Depression      Borderline personality disorder      Anxiety      H/O self-harm      ADD (attention deficit disorder)      PTSD (post-traumatic stress disorder)      Anorexia nervosa with bulimia      history of; on Topamax     Morbid obesity (H)      Lives in independent group home      due to debilitating mental illness       Surgical history:  Past Surgical History   Procedure Laterality Date     Mammoplasty reduction Bilateral      Release carpal tunnel Bilateral      Knee surgery Right      removed a small tissue mass from knee     Head & neck surgery  age 7     lymph nodes removed from neck; benign     Laparoscopic ablation endometriosis       Hc remove fecal impaction or fb w anesthesia N/A 12/18/2016     Procedure: REMOVE FECAL IMPACTION/FOREIGN BODY UNDER ANESTHESIA;  Surgeon: Soham Cano MD;  Location: [facility] OR     Exam under anesthesia anus N/A 1/10/2017     Procedure: EXAM UNDER ANESTHESIA ANUS;  Surgeon: Annmraie Haynes MD;  Location: [facility]     Laparotomy exploratory N/A 1/10/2017     Procedure: LAPAROTOMY EXPLORATORY;  Surgeon: Annmarie Haynes MD;  Location: [facility]     Sigmoidoscopy flexible N/A 1/10/2017     Procedure: SIGMOIDOSCOPY FLEXIBLE;  Surgeon:  Annmarie Haynes MD;  Location: [facility]       Problem list:  Patient Active Problem List    Diagnosis Date Noted     Rectal foreign body 01/11/2017     Priority: Medium     Migraine without aura      Priority: Medium     no known triggers; on Topamax bid and Imitrex PRN       Depression      Priority: Medium     Borderline personality disorder      Priority: Medium     Anxiety      Priority: Medium     H/O self-harm      Priority: Medium     ADD (attention deficit disorder)      Priority: Medium     PTSD (post-traumatic stress disorder)      Priority: Medium     Morbid obesity (H)      Priority: Medium       Medications:  Current Outpatient Prescriptions   Medication Sig Dispense Refill     oxyCODONE-acetaminophen (PERCOCET) 5-325 MG per tablet Take 1-2 tablets by mouth every 8 hours as needed for pain 30 tablet 0     senna-docusate (SENOKOT-S;PERICOLACE) 8.6-50 MG per tablet Take 2 tablets by mouth daily 60 tablet 1     order for DME Equipment being ordered: Other: shower chair  Treatment Diagnosis: s/p exploratory laparotomy for rectal foreign body 1 each 0     order for DME Equipment being ordered: Walker Wheels (), Walker () and Bath Seat ()  Treatment Diagnosis: unstable gait 1 Units 0     lidocaine (LIDODERM) 5 % Patch Place 1 patch onto the skin every 24 hours May substitute over the counter 4% lidocaine patches instead. Place on abdomen for incisional pain. 30 patch 3     polyethylene glycol (MIRALAX/GLYCOLAX) powder Take 17 g by mouth daily as needed for constipation 510 g 3     SUMATRIPTAN SUCCINATE PO Take 50 mg by mouth once as needed for migraine       TOPIRAMATE PO Take 50 mg by mouth in the morning and 100 mg by mouth in the evening       BUSPIRONE HCL PO Take 5 mg by mouth every morning        BUSPIRONE HCL PO Take 10 mg by mouth every evening       ibuprofen (ADVIL/MOTRIN) 200 MG tablet Take 3 tablets (600 mg) by mouth every 6 hours as needed for other (Headache) 100 tablet   "    ondansetron (ZOFRAN) 4 MG tablet Take 1 tablet (4 mg) by mouth every 6 hours 8 tablet 0     Desvenlafaxine Succinate (PRISTIQ PO) Take 100 mg by mouth daily       Acetaminophen (TYLENOL PO) Take 1,000 mg by mouth every 6 hours as needed        Cholecalciferol (VITAMIN D3 PO) Take 5,000 Units by mouth daily          Allergies:  Allergies   Allergen Reactions     Ancef [Cefazolin Sodium]      Augmentin Swelling     Blood-Group Specific Substance Other (See Comments)     Patient has an anti-Cw and non-specific antibodies. Blood product orders may be delayed. Draw one red top and two purple top tubes for all type/screen/crossmatch orders.     Hydrocodone Nausea and Vomiting     vomiting for days     Influenza Vaccines Other (See Comments)     Oseltamivir Hives     med stopped, PN: med stopped     Vicodin [Hydrocodone-Acetaminophen] Nausea and Vomiting     Cephalosporins Rash       Family history:  Family History   Problem Relation Age of Onset     Type 2 Diabetes Maternal Grandmother      Type 2 Diabetes Paternal Grandmother      Myocardial Infarction No family hx of      CEREBROVASCULAR DISEASE Father 53     Coronary Artery Disease Early Onset No family hx of      Breast Cancer Paternal Grandmother      Ovarian Cancer No family hx of      Colon Cancer No family hx of        Social history:  Social History   Substance Use Topics     Smoking status: Never Smoker      Smokeless tobacco: Never Used     Alcohol Use: No     Marital status: single.    Nursing Notes:   Agatha Ervin LPN  1/23/2017  3:48 PM  Signed  Chief Complaint   Patient presents with     Surgical Followup       Filed Vitals:    01/23/17 1547   BP: 129/75   Pulse: 70   Temp: 98.9  F (37.2  C)   TempSrc: Oral   Height: 5' 2\"   Weight: 228 lb   SpO2: 98%       Body mass index is 41.69 kg/(m^2).      Agatha HAYS LPN                            Physical Examination:  /75 mmHg  Pulse 70  Temp(Src) 98.9  F (37.2  C) (Oral)  Ht 5' 2\"  Wt 228 lb  BMI " 41.69 kg/m2  SpO2 98%  General: Alert, oriented, in no acute distress, sitting comfortably in wheelchair  HEENT: incus membranes moist   Abdomen: obese, soft, nondistended, nontender and palpation. Midline incision well approximated without any erythema or drainage.   Extremities:  warm, dry, no edema     Total face to face time was 30 minutes, >50% counseling.   this is a postoperative visit.     HU Avila, NP-C  Colon and Rectal Surgery  [facility]    This note was created using speech recognition software and may contain unintended word substitutions.          HU Avila CNP

## 2017-01-24 ENCOUNTER — OFFICE VISIT (OUTPATIENT)
Dept: INTERNAL MEDICINE | Facility: CLINIC | Age: 27
End: 2017-01-24
Payer: MEDICAID

## 2017-01-24 VITALS
TEMPERATURE: 98.2 F | HEIGHT: 62 IN | HEART RATE: 66 BPM | DIASTOLIC BLOOD PRESSURE: 80 MMHG | BODY MASS INDEX: 41.24 KG/M2 | OXYGEN SATURATION: 98 % | WEIGHT: 224.1 LBS | SYSTOLIC BLOOD PRESSURE: 120 MMHG

## 2017-01-24 DIAGNOSIS — L76.82 INCISIONAL PAIN: ICD-10-CM

## 2017-01-24 DIAGNOSIS — Z09 HOSPITAL DISCHARGE FOLLOW-UP: Primary | ICD-10-CM

## 2017-01-24 DIAGNOSIS — T18.5XXD: ICD-10-CM

## 2017-01-24 PROCEDURE — 99214 OFFICE O/P EST MOD 30 MIN: CPT | Performed by: INTERNAL MEDICINE

## 2017-01-24 RX ORDER — LIDOCAINE 50 MG/G
OINTMENT TOPICAL 3 TIMES DAILY
Qty: 30 G | Refills: 3 | Status: SHIPPED
Start: 2017-01-24 | End: 2017-02-07

## 2017-01-24 NOTE — PATIENT INSTRUCTIONS
Continue to monitor for fevers, chills, abnormal discharge from wound.     Minimize percocet as much as possible.    You will have continued soreness and mild-moderate pain for the next 4-6 weeks. This is normal. Would save percocet for moderate-severe pain.     Trial of Lidocaine ointment near incision (but not directly on it). Beware of oil damage from ointment.

## 2017-01-24 NOTE — MR AVS SNAPSHOT
After Visit Summary   1/24/2017    Nevin Alvarado    MRN: [id number]           Patient Information     Date Of Birth          1990        Visit Information        Provider Department      1/24/2017 1:30 PM Sandra Ramos MD [facility]        Today's Diagnoses     Incisional pain    -  1       Care Instructions    Continue to monitor for fevers, chills, abnormal discharge from wound.     Minimize percocet as much as possible.    You will have continued soreness and mild-moderate pain for the next 4-6 weeks. This is normal. Would save percocet for moderate-severe pain.     Trial of Lidocaine ointment near incision (but not directly on it). Beware of oil damage from ointment.         Follow-ups after your visit        Your next 10 appointments already scheduled     Feb 09, 2017  4:15 PM   (Arrive by 4:00 PM)   Post-Op with Annmarie Haynes MD   [facility] ([facility] [place])    [address] [place] [id number] [phone]              Who to contact     If you have questions or need follow up information about today's clinic visit or your schedule please contact [facility] directly at [phone].  Normal or non-critical lab and imaging results will be communicated to you by MyChart, letter or phone within 4 business days after the clinic has received the results. If you do not hear from us within 7 days, please contact the clinic through MyChart or phone. If you have a critical or abnormal lab result, we will notify you by phone as soon as possible.  Submit refill requests through [employer] or call your pharmacy and they will forward the refill request to us. Please allow 3 business days for your refill to be completed.          Additional Information About Your Visit        [employer] Information     [employer] gives you secure access to your electronic health  "record. If you see a primary care provider, you can also send messages to your care team and make appointments. If you have questions, please call your primary care clinic.  If you do not have a primary care provider, please call [phone] and they will assist you.        Care EveryWhere ID     This is your Care EveryWhere ID. This could be used by other organizations to access your [place] medical records  [id number]        Your Vitals Were     Pulse Temperature Height BMI (Body Mass Index) Pulse Oximetry       66 98.2  F (36.8  C) (Oral) 5' 2\" (1.575 m) 40.98 kg/m2 98%        Blood Pressure from Last 3 Encounters:   01/24/17 120/80   01/23/17 129/75   01/21/17 107/69    Weight from Last 3 Encounters:   01/24/17 224 lb 1.6 oz (101.651 kg)   01/23/17 228 lb (103.42 kg)   01/21/17 228 lb (103.42 kg)              Today, you had the following     No orders found for display         Today's Medication Changes          These changes are accurate as of: 1/24/17  1:54 PM.  If you have any questions, ask your nurse or doctor.               These medicines have changed or have updated prescriptions.        Dose/Directions    * lidocaine 5 % Patch   Commonly known as:  LIDODERM   This may have changed:  Another medication with the same name was added. Make sure you understand how and when to take each.   Used for:  Rectal foreign body, subsequent encounter        Dose:  1 patch   Place 1 patch onto the skin every 24 hours May substitute over the counter 4% lidocaine patches instead. Place on abdomen for incisional pain.   Quantity:  30 patch   Refills:  3       * lidocaine 5 % ointment   Commonly known as:  XYLOCAINE   This may have changed:  You were already taking a medication with the same name, and this prescription was added. Make sure you understand how and when to take each.   Used for:  Incisional pain   Changed by:  Sandra Ramos MD        Apply topically 3 times daily   Quantity:  30 g   Refills:  3       " * Notice:  This list has 2 medication(s) that are the same as other medications prescribed for you. Read the directions carefully, and ask your doctor or other care provider to review them with you.         Where to get your medicines      These medications were sent to [facility] [place] - [address], [place] [address]     Phone:  [phone]    - lidocaine 5 % ointment             Primary Care Provider Office Phone # Fax #    Sandra Ramos [phone]       [facility] [address] [facility] [address]        Thank you!     Thank you for choosing [facility]  for your care. Our goal is always to provide you with excellent care. Hearing back from our patients is one way we can continue to improve our services. Please take a few minutes to complete the written survey that you may receive in the mail after your visit with us. Thank you!             Your Updated Medication List - Protect others around you: Learn how to safely use, store and throw away your medicines at www.disposemymeds.org.          This list is accurate as of: 1/24/17  1:54 PM.  Always use your most recent med list.                   Brand Name Dispense Instructions for use    * BUSPIRONE HCL PO      Take 5 mg by mouth every morning       * BUSPIRONE HCL PO      Take 10 mg by mouth every evening       ibuprofen 200 MG tablet    ADVIL/MOTRIN    100 tablet    Take 3 tablets (600 mg) by mouth every 6 hours as needed for other (Headache)       * lidocaine 5 % Patch    LIDODERM    30 patch    Place 1 patch onto the skin every 24 hours May substitute over the counter 4% lidocaine patches instead. Place on abdomen for incisional pain.       * lidocaine 5 % ointment    XYLOCAINE    30 g    Apply topically 3 times daily       ondansetron 4 MG tablet    ZOFRAN    8 tablet    Take 1 tablet (4 mg) by mouth every 6 hours       * order for DME      1 each    Equipment being ordered: Other: shower chair Treatment Diagnosis: s/p exploratory laparotomy for rectal foreign body       * order for DME     1 Units    Equipment being ordered: Walker Wheels (), Walker () and Bath Seat () Treatment Diagnosis: unstable gait       oxyCODONE-acetaminophen 5-325 MG per tablet    PERCOCET    30 tablet    Take 1-2 tablets by mouth every 8 hours as needed for pain       polyethylene glycol powder    MIRALAX/GLYCOLAX    510 g    Take 17 g by mouth daily as needed for constipation       PRISTIQ PO      Take 100 mg by mouth daily       senna-docusate 8.6-50 MG per tablet    SENOKOT-S;PERICOLACE    60 tablet    Take 2 tablets by mouth daily       SUMATRIPTAN SUCCINATE PO      Take 50 mg by mouth once as needed for migraine       TOPIRAMATE PO      Take 50 mg by mouth in the morning and 100 mg by mouth in the evening       TYLENOL PO      Take 1,000 mg by mouth every 6 hours as needed       VITAMIN D3 PO      Take 5,000 Units by mouth daily       * Notice:  This list has 6 medication(s) that are the same as other medications prescribed for you. Read the directions carefully, and ask your doctor or other care provider to review them with you.

## 2017-01-24 NOTE — NURSING NOTE
"Chief Complaint   Patient presents with     Hospital F/U       Initial /80 mmHg  Pulse 66  Temp(Src) 98.2  F (36.8  C) (Oral)  Ht 5' 2\" (1.575 m)  Wt 224 lb 1.6 oz (101.651 kg)  BMI 40.98 kg/m2  SpO2 98% Estimated body mass index is 40.98 kg/(m^2) as calculated from the following:    Height as of this encounter: 5' 2\" (1.575 m).    Weight as of this encounter: 224 lb 1.6 oz (101.651 kg).  BP completed using cuff size: regular  "
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
## 2017-01-26 LAB — PHQ9 SCORE: 12

## 2017-02-05 ENCOUNTER — TELEPHONE (OUTPATIENT)
Dept: NURSING | Facility: CLINIC | Age: 27
End: 2017-02-05

## 2017-02-05 ENCOUNTER — HOSPITAL ENCOUNTER (EMERGENCY)
Facility: CLINIC | Age: 27
Discharge: HOME | End: 2017-02-05
Attending: EMERGENCY MEDICINE | Admitting: EMERGENCY MEDICINE
Payer: MEDICAID

## 2017-02-05 ENCOUNTER — APPOINTMENT (OUTPATIENT)
Dept: CT IMAGING | Facility: CLINIC | Age: 27
End: 2017-02-05
Attending: EMERGENCY MEDICINE
Payer: MEDICAID

## 2017-02-05 VITALS
HEART RATE: 70 BPM | WEIGHT: 222 LBS | DIASTOLIC BLOOD PRESSURE: 42 MMHG | TEMPERATURE: 98.4 F | BODY MASS INDEX: 40.85 KG/M2 | HEIGHT: 62 IN | RESPIRATION RATE: 18 BRPM | SYSTOLIC BLOOD PRESSURE: 107 MMHG | OXYGEN SATURATION: 99 %

## 2017-02-05 DIAGNOSIS — M54.2 CERVICALGIA: ICD-10-CM

## 2017-02-05 LAB
CREAT BLD-MCNC: 0.6 MG/DL (ref 0.52–1.04)
GFR SERPL CREATININE-BSD FRML MDRD: >90 ML/MIN/1.7M2
HCG UR QL: NORMAL
INTERNAL QC OK POCT: YES

## 2017-02-05 PROCEDURE — 25500064 ZZH RX 255 OP 636: Performed by: EMERGENCY MEDICINE

## 2017-02-05 PROCEDURE — 82565 ASSAY OF CREATININE: CPT

## 2017-02-05 PROCEDURE — 25000132 ZZH RX MED GY IP 250 OP 250 PS 637: Performed by: EMERGENCY MEDICINE

## 2017-02-05 PROCEDURE — 40000556 ZZH STATISTIC PERIPHERAL IV START W US GUIDANCE

## 2017-02-05 PROCEDURE — 70498 CT ANGIOGRAPHY NECK: CPT

## 2017-02-05 PROCEDURE — 99284 EMERGENCY DEPT VISIT MOD MDM: CPT | Mod: Z6 | Performed by: EMERGENCY MEDICINE

## 2017-02-05 PROCEDURE — 99285 EMERGENCY DEPT VISIT HI MDM: CPT | Mod: 25 | Performed by: EMERGENCY MEDICINE

## 2017-02-05 PROCEDURE — 81025 URINE PREGNANCY TEST: CPT | Performed by: EMERGENCY MEDICINE

## 2017-02-05 PROCEDURE — 25000125 ZZHC RX 250: Performed by: EMERGENCY MEDICINE

## 2017-02-05 PROCEDURE — 76377 3D RENDER W/INTRP POSTPROCES: CPT

## 2017-02-05 RX ORDER — CYCLOBENZAPRINE HCL 10 MG
10 TABLET ORAL ONCE
Status: COMPLETED | OUTPATIENT
Start: 2017-02-05 | End: 2017-02-05

## 2017-02-05 RX ORDER — CYCLOBENZAPRINE HCL 5 MG
10 TABLET ORAL 3 TIMES DAILY PRN
Qty: 42 TABLET | Refills: 0 | Status: SHIPPED | OUTPATIENT
Start: 2017-02-05 | End: 2017-03-04

## 2017-02-05 RX ORDER — IOPAMIDOL 755 MG/ML
100 INJECTION, SOLUTION INTRAVASCULAR ONCE
Status: COMPLETED | OUTPATIENT
Start: 2017-02-05 | End: 2017-02-05

## 2017-02-05 RX ADMIN — SODIUM CHLORIDE 80 ML: 9 INJECTION, SOLUTION INTRAVENOUS at 11:30

## 2017-02-05 RX ADMIN — CYCLOBENZAPRINE HYDROCHLORIDE 10 MG: 10 TABLET, FILM COATED ORAL at 13:11

## 2017-02-05 RX ADMIN — CYCLOBENZAPRINE HYDROCHLORIDE 10 MG: 10 TABLET, FILM COATED ORAL at 06:18

## 2017-02-05 RX ADMIN — IOPAMIDOL 70 ML: 755 INJECTION, SOLUTION INTRAVENOUS at 11:29

## 2017-02-05 ASSESSMENT — ENCOUNTER SYMPTOMS
SHORTNESS OF BREATH: 0
FEVER: 0
NECK STIFFNESS: 1
ABDOMINAL PAIN: 0
NECK PAIN: 1

## 2017-02-05 NOTE — ED AVS SNAPSHOT
[facility], Emergency Department    [address]    [place] [address]    Phone:  [phone]    Fax:  [phone]                                       Nevin Alvarado   MRN: [id number]    Department:  [facility], Emergency Department   Date of Visit:  2/5/2017           Patient Information     Date Of Birth          1990        Your diagnoses for this visit were:     Cervicalgia        You were seen by Oliva Cabello MD and Zay Doyle MD.      Follow-up Information     Follow up with Sandra Ramos MD.    Specialty:  Internal Medicine    Contact information:    [facility]  [address]  [facility] [address]  [phone]          Discharge Instructions         Neck/Back Pain: General    Both neck and back pain are usually caused by injury to the muscles or ligaments of the spine. Sometimes the disks that separate each bone of the spine may cause pain by pressing on a nearby nerve. Back and neck pain may appear after a sudden twisting/bending force (such as in a car accident), or sometimes after a simple awkward movement. In either case, muscle spasm is often present and adds to the pain.  Acute neck and back pain usually gets better in 1 to 2 weeks. Pain related to disk disease, arthritis in the spinal joints or spinal stenosis (narrowing of the spinal canal) can become chronic and last for months or years.  Back and neck pain are common problems. Most people feel better in 1 or 2 weeks, and most of the rest in 1 to 2 months. Most people can remain active.  Symptoms  People experience and describe pain differently.    Pain can be sharp, stabbing, shooting, aching, cramping, or burning    Movement, standing, bending, lifting, sitting, or walking may worsen the pain    Pain can be localized to one spot or area, or it can be more generalized    Pain can spread or radiate upwards, downwards, to the front, or go down your arms    Muscle spasm may  "occur.  Cause  Most of the time \"mechanical problems\" with the muscles or spine cause the pain. it is usually caused by an injury, whether known or not, to the muscles or ligaments. While illnesses can cause back pain, it is usually not caused by a serious illness. Pain is usually related to physical activity, whether sports, exercise, work, or normal activity. Sometimes it can occur without an identifiable cause. This can happen simply by stretching or moving wrong, without noting pain at the time. Other causes include:    Overexertion, lifting, pushing, pulling incorrectly or too aggressively.    Sudden twisting, bending or stretching from an accident (car or fall), or accidental movement.    Poor posture    Poor conditioning, lack of regular exercise    Spinal disc disease or arthritis    Stress    Pregnancy, or illness like appendicitis, bladder or kidney infection, pelvic infections   Home care    For neck pain: Use a comfortable pillow that supports the head and keeps the spine in a neutral position. The position of the head should not be tilted forward or backward.    When in bed, try to find a position of comfort. A firm mattress is best. Try lying flat on your back with pillows under your knees. You can also try lying on your side with your knees bent up towards your chest and a pillow between your knees.    At first, do not try to stretch out the sore spots. If there is a strain, it is not like the good soreness you get after exercising without an injury. In this case, stretching may make it worse.    Avoid prolonged sitting, long car rides or travel. This puts more stress on the lower back than standing or walking.    During the first 24 to 72 hours after an injury, apply an ice pack to the painful area for 20 minutes and then remove it for 20 minutes over a period of 60 to 90 minutes or several times a day. As a safety precaution, do not use a heating pad at bedtime. Sleeping with a heating pad can lead " to skin burns or tissue damage.    Ice and heat therapies can be alternated. Talk with your health care provider about the best treatment for your back or neck pain.    Therapeutic massage can help relax the back and neck muscles without stretching them.    Be aware of safe lifting methods and do not lift anything over 15 pounds until all the pain is gone.  Medications  Talk to your health care provider before using medications, especially if you have other medical problems or are taking other medicines.    You may use acetaminophen (such as Tylenol) or ibuprofen (such as Advil or Motrin) to control pain, unless another pain medicine was prescribed. If you have chronic conditions like diabetes, liver or kidney disease, stomach ulcers,  gastrointestinal bleeding, or are taking blood thinner medications.    Be careful if you are given pain medicines, narcotics, or medication for muscle spasm. They can cause drowsiness, and can affect your coordination, reflexes, and judgment. Do not drive or operate heavy machinery.  Follow-up care  Follow up with your health care provider if your symptoms do not start to improve after one week. Physical therapy or further tests may be needed.  If X-rays were taken, they will be reviewed by a radiologist. You will be notified of any new findings that may affect your care.  Call 911  Seek emergency medical care if any of the following occur:    Trouble breathing    Confusion    Very drowsy or trouble awakening    Fainting or loss of consciousness    Rapid or very slow heart rate    Loss of bowel or bladder control  When to seek medical care  Get prompt medical attention if any of the following occur:    Pain becomes worse or spreads into your arms or legs    Weakness, numbness or pain in one or both arms or legs    Numbness in the groin area    Difficulty walking    Fever of 100.4 F (38 C) or higher, or as directed by your healthcare provider    9577-0138 [employer] [address]  [place] [address]. All rights reserved. This information is not intended as a substitute for professional medical care. Always follow your healthcare professional's instructions.      Please make an appointment to follow up with Your Primary Care Provider this week as planned.      Future Appointments        Provider Department Dept Phone Center    2/7/2017 2:30 PM Sandra Ramos MD [facility] [phone]     2/9/2017 4:15 PM Annmarie Haynes MD [facility] [phone] [facility]      24 Hour Appointment Hotline       To make an appointment at any [facility], call [phone] ([phone]). If you don't have a family doctor or clinic, we will help you find one. [facility] are conveniently located to serve the needs of you and your family.             Review of your medicines      START taking        Dose / Directions Last dose taken    cyclobenzaprine 5 MG tablet   Commonly known as:  FLEXERIL   Dose:  10 mg   Quantity:  42 tablet        Take 2 tablets (10 mg) by mouth 3 times daily as needed for muscle spasms   Refills:  0          Our records show that you are taking the medicines listed below. If these are incorrect, please call your family doctor or clinic.        Dose / Directions Last dose taken    BUSPIRONE HCL PO   Dose:  5 mg        Take 5 mg by mouth every morning   Refills:  0        ibuprofen 200 MG tablet   Commonly known as:  ADVIL/MOTRIN   Dose:  600 mg   Quantity:  100 tablet        Take 3 tablets (600 mg) by mouth every 6 hours as needed for other (Headache)   Refills:  0        * lidocaine 5 % Patch   Commonly known as:  LIDODERM   Dose:  1 patch   Quantity:  30 patch        Place 1 patch onto the skin every 24 hours May substitute over the counter 4% lidocaine patches instead. Place on abdomen for incisional pain.   Refills:  3        * lidocaine 5 % ointment   Commonly known as:  XYLOCAINE    Quantity:  30 g        Apply topically 3 times daily   Refills:  3        ondansetron 4 MG tablet   Commonly known as:  ZOFRAN   Dose:  4 mg   Quantity:  8 tablet        Take 1 tablet (4 mg) by mouth every 6 hours   Refills:  0        * order for DME   Quantity:  1 each        Equipment being ordered: Other: shower chair Treatment Diagnosis: s/p exploratory laparotomy for rectal foreign body   Refills:  0        * order for DME   Quantity:  1 Units        Equipment being ordered: Walker Wheels (), Walker () and Bath Seat () Treatment Diagnosis: unstable gait   Refills:  0        oxyCODONE-acetaminophen 5-325 MG per tablet   Commonly known as:  PERCOCET   Dose:  1-2 tablet   Quantity:  30 tablet        Take 1-2 tablets by mouth every 8 hours as needed for pain   Refills:  0        polyethylene glycol powder   Commonly known as:  MIRALAX/GLYCOLAX   Dose:  17 g   Quantity:  510 g        Take 17 g by mouth daily as needed for constipation   Refills:  3        PRISTIQ PO   Dose:  100 mg        Take 100 mg by mouth daily   Refills:  0        senna-docusate 8.6-50 MG per tablet   Commonly known as:  SENOKOT-S;PERICOLACE   Dose:  2 tablet   Quantity:  60 tablet        Take 2 tablets by mouth daily   Refills:  1        SUMATRIPTAN SUCCINATE PO   Dose:  50 mg        Take 50 mg by mouth once as needed for migraine   Refills:  0        TOPIRAMATE PO        Take 50 mg by mouth in the morning and 100 mg by mouth in the evening   Refills:  0        TYLENOL PO   Dose:  1000 mg        Take 1,000 mg by mouth every 6 hours as needed   Refills:  0        VITAMIN D3 PO   Dose:  5000 Units        Take 5,000 Units by mouth daily   Refills:  0        * Notice:  This list has 4 medication(s) that are the same as other medications prescribed for you. Read the directions carefully, and ask your doctor or other care provider to review them with you.            Prescriptions were sent or printed at these locations (1  Prescription)                   Other Prescriptions                Printed at Department/Unit printer (1 of 1)         cyclobenzaprine (FLEXERIL) 5 MG tablet                Procedures and tests performed during your visit     CT Neck Angio w/o & w Contrast    Creatinine POCT    ISTAT creatinine  POCT    hCG qual urine POCT      Orders Needing Specimen Collection     None      Pending Results     Date and Time Order Name Status Description    2/5/2017 0628 CT Neck Angio w/o & w Contrast Preliminary             Pending Culture Results     No orders found from 2/4/2017 to 2/6/2017.            Thank you for choosing [facility]       Thank you for choosing [facility] for your care. Our goal is always to provide you with excellent care. Hearing back from our patients is one way we can continue to improve our services. Please take a few minutes to complete the written survey that you may receive in the mail after you visit with us. Thank you!        [employer] Information     [employer] gives you secure access to your electronic health record. If you see a primary care provider, you can also send messages to your care team and make appointments. If you have questions, please call your primary care clinic.  If you do not have a primary care provider, please call [phone] and they will assist you.        Care EveryWhere ID     This is your Care EveryWhere ID. This could be used by other organizations to access your [facility] medical records  [id number]        After Visit Summary       This is your record. Keep this with you and show to your community pharmacist(s) and doctor(s) at your next visit.
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
## 2017-02-05 NOTE — ED AVS SNAPSHOT
[facility], Emergency Department    [address]    [facility] [address]    Phone:  [phone]    Fax:  [phone]                                       Nevin Alvarado   MRN: [id number]    Department:  [facility], Emergency Department   Date of Visit:  2/5/2017           After Visit Summary Signature Page     I have received my discharge instructions, and my questions have been answered. I have discussed any challenges I see with this plan with the nurse or doctor.    ..........................................................................................................................................  Patient/Patient Representative Signature      ..........................................................................................................................................  Patient Representative Print Name and Relationship to Patient    ..................................................               ................................................  Date                                            Time    ..........................................................................................................................................  Reviewed by Signature/Title    ...................................................              ..............................................  Date                                                            Time

## 2017-02-05 NOTE — TELEPHONE ENCOUNTER
"Call Type: Triage Call    Presenting Problem: Patient is complaining of \"neck pain,\" rates pain  in neck 7-9/10. Patient \"states movement makes it worse and is  unable to sleep.\" Patient states ibuprofen, heat or ice is not  making it better. Triaged for neck pain/Disposition to see ED  immediately.  Triage Note:  Guideline Title: Neck Pain  Recommended Disposition: See ED Immediately  Original Inclination: Wanted to speak with a nurse  Override Disposition:  Intended Action: Go to Hospital / ED  Physician Contacted: No  New onset of unbearable pain within last 24 hours ?  YES  Severe breathing problems ? NO  Injury to the neck ? NO  New or worsening signs and symptoms that may indicate shock ? NO  Neck mass/swelling ? NO  Head injury ? NO  Choking sensation, cannot swallow own saliva with associated drooling and soft  muffled voice ? NO  Seizure now or within last 6 hours ? NO  New onset or unexplained change in bowel or bladder control (unable to urinate and  full feeling or loss of control of bowel or bladder) ? NO  Any other cardiac signs/symptoms for more than 5 minutes, now or within last hour.  Pain is NOT associated with taking a deep breath or a productive cough, movement,  or touch to a localized area on the chest or upper body. ? NO  Sudden, severe disabling head pain OR caller spontaneously verbalizes \"worst  headache of my life\" ? NO  New onset of neck pain with forward head movement (no injury) AND severe  generalized headache, fever, or altered mental status ? NO  Unexplained blood-colored (purple or red) flat pinpoint dots, spots or patches on  the skin ? NO  Physician Instructions:  Care Advice: Another adult should drive.  Call  immediately if any of the following occur: any loss of  consciousness  new confusion, drowsiness or agitation  difficulty speaking  new weakness or paralysis, severe numbness, or difficulty moving.  IMMEDIATE ACTION  Write down provider's name. List or place the " following in a bag for  transport with the patient: current prescription and/or nonprescription  medications  alternative treatments, therapies and medications  and street drugs.

## 2017-02-05 NOTE — ED NOTES
Bed: ED04  Expected date: 2/5/17  Expected time: 5:12 AM  Means of arrival: Ambulance  Comments:  Enzo  26 F  Neck/shoulder pain

## 2017-02-05 NOTE — DISCHARGE INSTRUCTIONS
"  Neck/Back Pain: General    Both neck and back pain are usually caused by injury to the muscles or ligaments of the spine. Sometimes the disks that separate each bone of the spine may cause pain by pressing on a nearby nerve. Back and neck pain may appear after a sudden twisting/bending force (such as in a car accident), or sometimes after a simple awkward movement. In either case, muscle spasm is often present and adds to the pain.  Acute neck and back pain usually gets better in 1 to 2 weeks. Pain related to disk disease, arthritis in the spinal joints or spinal stenosis (narrowing of the spinal canal) can become chronic and last for months or years.  Back and neck pain are common problems. Most people feel better in 1 or 2 weeks, and most of the rest in 1 to 2 months. Most people can remain active.  Symptoms  People experience and describe pain differently.    Pain can be sharp, stabbing, shooting, aching, cramping, or burning    Movement, standing, bending, lifting, sitting, or walking may worsen the pain    Pain can be localized to one spot or area, or it can be more generalized    Pain can spread or radiate upwards, downwards, to the front, or go down your arms    Muscle spasm may occur.  Cause  Most of the time \"mechanical problems\" with the muscles or spine cause the pain. it is usually caused by an injury, whether known or not, to the muscles or ligaments. While illnesses can cause back pain, it is usually not caused by a serious illness. Pain is usually related to physical activity, whether sports, exercise, work, or normal activity. Sometimes it can occur without an identifiable cause. This can happen simply by stretching or moving wrong, without noting pain at the time. Other causes include:    Overexertion, lifting, pushing, pulling incorrectly or too aggressively.    Sudden twisting, bending or stretching from an accident (car or fall), or accidental movement.    Poor posture    Poor conditioning, " lack of regular exercise    Spinal disc disease or arthritis    Stress    Pregnancy, or illness like appendicitis, bladder or kidney infection, pelvic infections   Home care    For neck pain: Use a comfortable pillow that supports the head and keeps the spine in a neutral position. The position of the head should not be tilted forward or backward.    When in bed, try to find a position of comfort. A firm mattress is best. Try lying flat on your back with pillows under your knees. You can also try lying on your side with your knees bent up towards your chest and a pillow between your knees.    At first, do not try to stretch out the sore spots. If there is a strain, it is not like the good soreness you get after exercising without an injury. In this case, stretching may make it worse.    Avoid prolonged sitting, long car rides or travel. This puts more stress on the lower back than standing or walking.    During the first 24 to 72 hours after an injury, apply an ice pack to the painful area for 20 minutes and then remove it for 20 minutes over a period of 60 to 90 minutes or several times a day. As a safety precaution, do not use a heating pad at bedtime. Sleeping with a heating pad can lead to skin burns or tissue damage.    Ice and heat therapies can be alternated. Talk with your health care provider about the best treatment for your back or neck pain.    Therapeutic massage can help relax the back and neck muscles without stretching them.    Be aware of safe lifting methods and do not lift anything over 15 pounds until all the pain is gone.  Medications  Talk to your health care provider before using medications, especially if you have other medical problems or are taking other medicines.    You may use acetaminophen (such as Tylenol) or ibuprofen (such as Advil or Motrin) to control pain, unless another pain medicine was prescribed. If you have chronic conditions like diabetes, liver or kidney disease, stomach  ulcers,  gastrointestinal bleeding, or are taking blood thinner medications.    Be careful if you are given pain medicines, narcotics, or medication for muscle spasm. They can cause drowsiness, and can affect your coordination, reflexes, and judgment. Do not drive or operate heavy machinery.  Follow-up care  Follow up with your health care provider if your symptoms do not start to improve after one week. Physical therapy or further tests may be needed.  If X-rays were taken, they will be reviewed by a radiologist. You will be notified of any new findings that may affect your care.  Call 911  Seek emergency medical care if any of the following occur:    Trouble breathing    Confusion    Very drowsy or trouble awakening    Fainting or loss of consciousness    Rapid or very slow heart rate    Loss of bowel or bladder control  When to seek medical care  Get prompt medical attention if any of the following occur:    Pain becomes worse or spreads into your arms or legs    Weakness, numbness or pain in one or both arms or legs    Numbness in the groin area    Difficulty walking    Fever of 100.4 F (38 C) or higher, or as directed by your healthcare provider    7167-0472 [employer]. [address] [place] [address]. All rights reserved. This information is not intended as a substitute for professional medical care. Always follow your healthcare professional's instructions.      Please make an appointment to follow up with Your Primary Care Provider this week as planned.

## 2017-02-05 NOTE — ED NOTES
12:57 PM  Discussed normal findings of CT angiogram with the patient.  Patient continues to complain of pain stating that her pharmacy is not open today and that she has restricted that pharmacy.  She was given a 2nd dose of Flexeril prior to discharge.  It was recommended that she also use ice alternating with heat and follow-up with primary physician in the next 2 days as scheduled.    Zay Doyle MD  02/05/17 [id number]
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
## 2017-02-07 ENCOUNTER — OFFICE VISIT (OUTPATIENT)
Dept: INTERNAL MEDICINE | Facility: CLINIC | Age: 27
End: 2017-02-07
Payer: MEDICAID

## 2017-02-07 VITALS
SYSTOLIC BLOOD PRESSURE: 118 MMHG | RESPIRATION RATE: 16 BRPM | HEIGHT: 62 IN | DIASTOLIC BLOOD PRESSURE: 74 MMHG | WEIGHT: 222.8 LBS | HEART RATE: 93 BPM | TEMPERATURE: 98.8 F | BODY MASS INDEX: 41 KG/M2 | OXYGEN SATURATION: 98 %

## 2017-02-07 DIAGNOSIS — M54.2 NECK PAIN ON RIGHT SIDE: Primary | ICD-10-CM

## 2017-02-07 DIAGNOSIS — Z79.899 MEDICATION MANAGEMENT: ICD-10-CM

## 2017-02-07 PROCEDURE — 99213 OFFICE O/P EST LOW 20 MIN: CPT | Performed by: INTERNAL MEDICINE

## 2017-02-07 RX ORDER — LIDOCAINE 50 MG/G
OINTMENT TOPICAL 3 TIMES DAILY PRN
COMMUNITY
Start: 2017-02-07 | End: 2017-03-04

## 2017-02-07 RX ORDER — AMOXICILLIN 250 MG
2 CAPSULE ORAL DAILY PRN
COMMUNITY
Start: 2017-02-07 | End: 2017-04-01

## 2017-02-07 NOTE — PATIENT INSTRUCTIONS
I have placed a physical therapy referral for you.    Please schedule an appointment at your earliest convenience - phone number below.     ---    Warm compresses and ibuprofen as needed for comfort.    ---    If no improvement with physical therapy, please let me know.

## 2017-02-07 NOTE — MR AVS SNAPSHOT
After Visit Summary   2/7/2017    Nevin Alvarado    MRN: [id number]           Patient Information     Date Of Birth          1990        Visit Information        Provider Department      2/7/2017 2:30 PM Sandra Ramos MD [facility]        Today's Diagnoses     Incisional pain    -  1     Neck pain on right side         Drug-induced constipation           Care Instructions    I have placed a physical therapy referral for you.    Please schedule an appointment at your earliest convenience - phone number below.     ---    Warm compresses and ibuprofen as needed for comfort.    ---    If no improvement with physical therapy, please let me know.               Follow-ups after your visit        Additional Services     PHYSICAL THERAPY REFERRAL       *This therapy referral will be filtered to a centralized scheduling office at [facility] and the patient will receive a call to schedule an appointment at a [facility] location most convenient for them. *     [facility] provides Physical Therapy evaluation and treatment and many specialty services across the [place] [facility] system.  If requesting a specialty program, please choose from the list below.    If you have not heard from the scheduling office within 2 business days, please call [phone] for all locations, with the exception of [place], please call [phone].  Treatment: Evaluation & Treatment  Special Instructions/Modalities: none  Special Programs: None    Please be aware that coverage of these services is subject to the terms and limitations of your health insurance plan.  Call member services at your health plan with any benefit or coverage questions.      **Note to Provider:  If you are referring outside of [facility] for the therapy appointment, please list the name of the location in the  special instructions  above, print the referral and give to the patient to  "schedule the appointment.                  Your next 10 appointments already scheduled     Feb 09, 2017  4:15 PM   (Arrive by 4:00 PM)   Post-Op with Annmarie Haynes MD   [facility] ([facility] [place])    [address] [place] [address] [phone]              Who to contact     If you have questions or need follow up information about today's clinic visit or your schedule please contact [facility] directly at [phone].  Normal or non-critical lab and imaging results will be communicated to you by [employer], letter or phone within 4 business days after the clinic has received the results. If you do not hear from us within 7 days, please contact the clinic through [employer] or phone. If you have a critical or abnormal lab result, we will notify you by phone as soon as possible.  Submit refill requests through [employer] or call your pharmacy and they will forward the refill request to us. Please allow 3 business days for your refill to be completed.          Additional Information About Your Visit        MyChart Information     [employer] gives you secure access to your electronic health record. If you see a primary care provider, you can also send messages to your care team and make appointments. If you have questions, please call your primary care clinic.  If you do not have a primary care provider, please call [phone] and they will assist you.        Care EveryWhere ID     This is your Care EveryWhere ID. This could be used by other organizations to access your [facility] medical records  [id number]        Your Vitals Were     Pulse Temperature Respirations Height BMI (Body Mass Index) Pulse Oximetry    93 98.8  F (37.1  C) (Oral) 16 5' 2\" (1.575 m) 40.74 kg/m2 98%       Blood Pressure from Last 3 Encounters:   02/07/17 118/74   02/05/17 107/42   01/24/17 120/80    Weight from Last 3 Encounters:   02/07/17 222 " lb 12.8 oz (101.061 kg)   02/05/17 222 lb (100.699 kg)   01/24/17 224 lb 1.6 oz (101.651 kg)              We Performed the Following     PHYSICAL THERAPY REFERRAL          Today's Medication Changes          These changes are accurate as of: 2/7/17  2:47 PM.  If you have any questions, ask your nurse or doctor.               These medicines have changed or have updated prescriptions.        Dose/Directions    lidocaine 5 % ointment   Commonly known as:  XYLOCAINE   This may have changed:    - when to take this  - reasons to take this   Used for:  Incisional pain   Changed by:  Sandra Ramos MD        Apply topically 3 times daily as needed for moderate pain   Refills:  0       senna-docusate 8.6-50 MG per tablet   Commonly known as:  SENOKOT-S;PERICOLACE   This may have changed:    - when to take this  - reasons to take this   Used for:  Drug-induced constipation   Changed by:  Sandra Ramos MD        Dose:  2 tablet   Take 2 tablets by mouth daily as needed for constipation   Refills:  0                Primary Care Provider Office Phone # Fax #    Sandra Ramos [phone]       [facility] [address] [facility] [address]        Thank you!     Thank you for choosing [facility]  for your care. Our goal is always to provide you with excellent care. Hearing back from our patients is one way we can continue to improve our services. Please take a few minutes to complete the written survey that you may receive in the mail after your visit with us. Thank you!             Your Updated Medication List - Protect others around you: Learn how to safely use, store and throw away your medicines at www.disposemymeds.org.          This list is accurate as of: 2/7/17  2:47 PM.  Always use your most recent med list.                   Brand Name Dispense Instructions for use    BUSPIRONE HCL PO      Take 5 mg by mouth every morning       cyclobenzaprine 5  MG tablet    FLEXERIL    42 tablet    Take 2 tablets (10 mg) by mouth 3 times daily as needed for muscle spasms       ibuprofen 200 MG tablet    ADVIL/MOTRIN    100 tablet    Take 3 tablets (600 mg) by mouth every 6 hours as needed for other (Headache)       lidocaine 5 % ointment    XYLOCAINE     Apply topically 3 times daily as needed for moderate pain       ondansetron 4 MG tablet    ZOFRAN    8 tablet    Take 1 tablet (4 mg) by mouth every 6 hours       polyethylene glycol powder    MIRALAX/GLYCOLAX    510 g    Take 17 g by mouth daily as needed for constipation       PRISTIQ PO      Take 100 mg by mouth daily       senna-docusate 8.6-50 MG per tablet    SENOKOT-S;PERICOLACE     Take 2 tablets by mouth daily as needed for constipation       SUMATRIPTAN SUCCINATE PO      Take 50 mg by mouth once as needed for migraine       TOPIRAMATE PO      Take 50 mg by mouth in the morning and 100 mg by mouth in the evening       TYLENOL PO      Take 1,000 mg by mouth every 6 hours as needed       VITAMIN D3 PO      Take 5,000 Units by mouth daily

## 2017-02-07 NOTE — NURSING NOTE
"Chief Complaint   Patient presents with     Neck Pain       Initial /74 mmHg  Pulse 93  Temp(Src) 98.8  F (37.1  C) (Oral)  Resp 16  Ht 5' 2\" (1.575 m)  Wt 222 lb 12.8 oz (101.061 kg)  BMI 40.74 kg/m2  SpO2 98% Estimated body mass index is 40.74 kg/(m^2) as calculated from the following:    Height as of this encounter: 5' 2\" (1.575 m).    Weight as of this encounter: 222 lb 12.8 oz (101.061 kg).  Medication Reconciliation: incomplete   States has had neck pain since 1-28-17  Went to ER on 2-5-17 for this  Got Rx for Flexeril has only taken 2 doses so far. Did not  Rx until yesterday  Edwige De La Rosa LPN      "

## 2017-02-07 NOTE — PROGRESS NOTES
"  SUBJECTIVE:                                                      HPI: Nevin Alvarado is a pleasant 26 year old female who presents with neck pain:    - started ~2 weeks ago  - woke up one morning with it - thinks she may have slept funny  - no precipitating trauma, fall, or unusual exertion  - involves right neck, right upper back, and radiates down right side of back  - describes pain as sore and sharp as well as numb and tingly  - moderate in severity at first, but worsening over time    - notes occasional right hand and foot numbness and tingling at rest   - no arm or leg involvement   - no weakness or loss of function   - no gait instability or falls/near-misses    - has been using Tylenol, ibuprofen, and cool packs as needed - not providing significant relief    The medication, allergy, and problem lists have been reviewed and updated as appropriate.     Patient requests that lidocaine ointment and senna-docusate be made as needed (as opposed to scheduled).    OBJECTIVE:                                                      /74 mmHg  Pulse 93  Temp(Src) 98.8  F (37.1  C) (Oral)  Resp 16  Ht 5' 2\" (1.575 m)  Wt 222 lb 12.8 oz (101.061 kg)  BMI 40.74 kg/m2  SpO2 98%  Constitutional: well-appearing; no apparent distress  Cervical spine: no deformity, crepitus, or step-off; no spinal or perispinal tenderness to palpation  Musculoskeletal:   - normal gait, station, and transfers  - right trapezius tenderness to palpation  - normal upper and lower extremity strength     ASSESSMENT/PLAN:                                                      (M54.2) Neck pain on right side  (primary encounter diagnosis)  Comment:    - suspect trapezius tension/strain --> local nerve irritation.   - no trauma or weakness - will defer imaging for now.   Plan:    - recommend trial of PT - referral placed.   - lidocaine ointment and heat application as needed for additional pain relief.    - if symptoms worsen, change, " or do not improve, patient to contact MD.     (T97.302) Medication management  Plan: lidocaine ointment & senna-colace made as needed (from scheduled).    The instructions on the AVS were discussed and explained to the patient. Patient expressed understanding of instructions.    Sandra Ramos MD   [address] [address]  T: [phone], F: [phone]

## 2017-02-08 ENCOUNTER — TELEPHONE (OUTPATIENT)
Dept: SURGERY | Facility: CLINIC | Age: 27
End: 2017-02-08

## 2017-02-08 NOTE — TELEPHONE ENCOUNTER
Nevin called the call center and wants to reschedule her post op. She is moving a friend into her apartment and cannot make her appointment tomorrow. I confirmed new time and date with her.

## 2017-02-13 ENCOUNTER — THERAPY VISIT (OUTPATIENT)
Dept: PHYSICAL THERAPY | Facility: CLINIC | Age: 27
End: 2017-02-13
Payer: MEDICAID

## 2017-02-13 DIAGNOSIS — M54.12 CERVICAL RADICULITIS: Primary | ICD-10-CM

## 2017-02-13 PROCEDURE — 97110 THERAPEUTIC EXERCISES: CPT | Mod: GP | Performed by: PHYSICAL THERAPIST

## 2017-02-13 PROCEDURE — 97162 PT EVAL MOD COMPLEX 30 MIN: CPT | Mod: GP | Performed by: PHYSICAL THERAPIST

## 2017-02-13 PROCEDURE — 97112 NEUROMUSCULAR REEDUCATION: CPT | Mod: GP | Performed by: PHYSICAL THERAPIST
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
## 2017-02-14 ENCOUNTER — TELEPHONE (OUTPATIENT)
Dept: NURSING | Facility: CLINIC | Age: 27
End: 2017-02-14

## 2017-02-14 NOTE — TELEPHONE ENCOUNTER
"Call Type: Triage Call    Presenting Problem: Patient is calling and states she is having  \"lower abdomen cramping,\" and is having \"continual bleeding and  discharge.\" Patient rates pain 5/10. Triaged for vaginal bleeding,  premenopausal: abnormal/Disposition to see provider within 8 hours.  Triage Note:  Guideline Title: Vaginal Bleeding, Premenopausal: Abnormal  Recommended Disposition: See Provider within 8 Hours  Original Inclination: Wanted to speak with a nurse  Override Disposition:  Intended Action: See Dr/Emerson Appt  Physician Contacted: No  Mild bleeding or spotting AND moderate abdominal pain or cramping other than  during usual menstrual cycle ?  YES  Foul-smelling vaginal discharge ? NO  Vaginal trauma, sexual assault or rape ? NO  Has IUD inserted ? NO  New or worsening signs and symptoms that may indicate shock ? NO  Pregnant,  less than 20 weeks gestation ? NO  Pregnant,  greater than 20 weeks gestation ? NO  Unbearable abdominal/pelvic pain or cramping ? NO  Abdominal/pelvic pain AND has ectopic risk factor(s) ? NO  Profuse vaginal bleeding not contained by pads or tampons ? NO  Possible trauma from vaginal foreign body ? NO  Heavy vaginal bleeding (soaking 1 pad/tampon every hour for 2 hours or more) ? NO  Recent GYN surgery ? NO  Menopausal AND not on menopausal hormone therapy (MHT) ? NO  Menopausal AND on menopausal hormone therapy (MHT) ? NO  Moderate bleeding other than during usual menstrual cycle ? NO  Persistent dizziness OR lightheadedness that does not resolve within ten minutes ?  NO  Recent childbirth or miscarriage ? NO  Physician Instructions:  Care Advice: Allow the patient to be in a position of comfort.  Another adult should drive.  Call provider if symptoms worsen or new symptoms develop.  Bring any tissue that has passed for examination by provider.  Do not eat or drink anything until evaluated by provider.  Call  if signs and symptoms of shock develop (such as unable " to  stand due to faintness, dizziness, or lightheadedness  new onset of confusion  slow to respond or difficult to awaken  skin is pale, gray, cool, or moist to touch  severe weakness  loss of consciousness).  CAUTIONS  SYMPTOM / CONDITION MANAGEMENT  List, or take, all current prescription(s), nonprescription or alternative  medication(s) to provider for evaluation.

## 2017-02-15 ENCOUNTER — OFFICE VISIT (OUTPATIENT)
Dept: SURGERY | Facility: CLINIC | Age: 27
End: 2017-02-15

## 2017-02-15 VITALS
DIASTOLIC BLOOD PRESSURE: 76 MMHG | WEIGHT: 228.1 LBS | SYSTOLIC BLOOD PRESSURE: 139 MMHG | HEIGHT: 62 IN | HEART RATE: 90 BPM | BODY MASS INDEX: 41.97 KG/M2 | TEMPERATURE: 98.4 F

## 2017-02-15 DIAGNOSIS — T18.5XXD: Primary | ICD-10-CM

## 2017-02-15 ASSESSMENT — PAIN SCALES - GENERAL: PAINLEVEL: MODERATE PAIN (5)

## 2017-02-15 NOTE — NURSING NOTE
"No chief complaint on file.      Vitals:    02/15/17 0828   BP: 139/76   Pulse: 90   Temp: 98.4  F (36.9  C)   TempSrc: Oral   Weight: 228 lb 1.6 oz   Height: 5' 2\"       Body mass index is 41.72 kg/(m^2).  Agatha HAYS LPN                       "

## 2017-02-15 NOTE — MR AVS SNAPSHOT
After Visit Summary   2/15/2017    Nevin Alvarado    MRN: [id number]           Patient Information     Date Of Birth          1990        Visit Information        Provider Department      2/15/2017 9:00 AM Annmarie Haynes MD [facility] Colon and Rectal Surgery        Today's Diagnoses     Rectal foreign body, subsequent encounter    -  1       Follow-ups after your visit        Your next 10 appointments already scheduled     Feb 21, 2017 10:20 AM CST   [facility] Diana Mullen, PT   [facility] ([facility] [place]  )    [address]  [address]  [place] [id number]   [phone]              Who to contact     Please call your clinic at [phone] to:    Ask questions about your health    Make or cancel appointments    Discuss your medicines    Learn about your test results    Speak to your doctor   If you have compliments or concerns about an experience at your clinic, or if you wish to file a complaint, please contact [facility] Patient Relations at [phone] or email us at [email or web address]         Additional Information About Your Visit        MyChart Information     [employer] gives you secure access to your electronic health record. If you see a primary care provider, you can also send messages to your care team and make appointments. If you have questions, please call your primary care clinic.  If you do not have a primary care provider, please call [phone] and they will assist you.      [employer] [place] is an electronic gateway that provides easy, online access to your medical records. With [employer] [place], you can request a clinic appointment, read your test results, renew a prescription or communicate with your care team.     To access your existing account, please contact your [facility] or call [phone] for assistance.        Care EveryWhere ID     This is your Care EveryWhere ID. This  "could be used by other organizations to access your [facility] medical records  [id number]        Your Vitals Were     Pulse Temperature Height BMI (Body Mass Index)          90 98.4  F (36.9  C) (Oral) 5' 2\" 41.72 kg/m2         Blood Pressure from Last 3 Encounters:   02/15/17 139/76   02/07/17 118/74   02/05/17 107/42    Weight from Last 3 Encounters:   02/15/17 228 lb 1.6 oz   02/07/17 222 lb 12.8 oz   02/05/17 222 lb              Today, you had the following     No orders found for display       Primary Care Provider Office Phone # Fax #    Sandra Ramos [phone]       [facility] [address] [place] [id number]        Thank you!     Thank you for choosing [facility] COLON AND RECTAL SURGERY  for your care. Our goal is always to provide you with excellent care. Hearing back from our patients is one way we can continue to improve our services. Please take a few minutes to complete the written survey that you may receive in the mail after your visit with us. Thank you!             Your Updated Medication List - Protect others around you: Learn how to safely use, store and throw away your medicines at www.disposemymeds.org.          This list is accurate as of: 2/15/17 11:59 PM.  Always use your most recent med list.                   Brand Name Dispense Instructions for use    cyclobenzaprine 5 MG tablet    FLEXERIL    42 tablet    Take 2 tablets (10 mg) by mouth 3 times daily as needed for muscle spasms       ibuprofen 200 MG tablet    ADVIL/MOTRIN    100 tablet    Take 3 tablets (600 mg) by mouth every 6 hours as needed for other (Headache)       lidocaine 5 % ointment    XYLOCAINE     Apply topically 3 times daily as needed for moderate pain       ondansetron 4 MG tablet    ZOFRAN    8 tablet    Take 1 tablet (4 mg) by mouth every 6 hours       polyethylene glycol powder    MIRALAX/GLYCOLAX    510 g    Take 17 g by mouth daily as needed for constipation       PRISTIQ " PO      Take 100 mg by mouth daily       senna-docusate 8.6-50 MG per tablet    SENOKOT-S;PERICOLACE     Take 2 tablets by mouth daily as needed for constipation       SUMATRIPTAN SUCCINATE PO      Take 50 mg by mouth once as needed for migraine       TOPIRAMATE PO      Take 50 mg by mouth in the morning and 100 mg by mouth in the evening       TYLENOL PO      Take 1,000 mg by mouth every 6 hours as needed       VITAMIN D3 PO      Take 5,000 Units by mouth daily

## 2017-02-15 NOTE — LETTER
"2/15/2017       RE: Nevin Alvarado  [address]  [place] [address]     Dear Colleague,    Thank you for referring your patient, Nevin Alvarado, to the [facility] COLON AND RECTAL SURGERY at [facility]. Please see a copy of my visit note below.    Colon and Rectal Surgery Postoperative Clinic Note    RE: Nevin Alvarado  : 1990  MOR: 2/15/2017    Nevin Alvarado is a very pleasant 26 year old female with a history of borderline personality disorder, anxiety, ADD, PTSD, living in a group home who presented with a rectal foreign body and is status post exam under anesthesia of the anus, flexible sigmoidoscopy, exploratory laparotomy with removal of rectal foreign body. No enterotomy or bowel resection was necessary. She was discharged to home on 2017 and presents today for follow-up.    Interval history: Nevin has been doing well. She has some generalized body aches. She has some incisional pain but denies any deep abdominal pain. She is having normal, soft \"balls\" of stool with the use of daily MiraLAX and senna. She is voiding normally. She denies any bleeding or burning with urination. She has had vaginal bleeding since surgery and has an IUD. This has not previously occurred.     PLEASE SEE NOTE BELOW FOR PHYSICAL EXAMINATION, REVIEW OF SYSTEMS, AND OTHER HISTORY.    Assessment/Plan:  26 year old female status post exam under anesthesia of the anus, flexible sigmoidoscopy, exploratory laparotomy with removal of rectal foreign body.    1- Post-op: having a good recovery. No issues from this perspective. I advised her to wear her belt line lower to avoid a hypertrophic scar. Overall, she looks well. She will avoid strenuous activity for a total of 8 weeks after surgery.    2 - Vaginal bleeding with an IUD: she will call to get in with her gynecologist in the next few weeks. This has not been ongoing for about a " month.     This is a postoperative visit. Total time was 25 minutes, over 50% was spent counseling the patient.     Annmarie Haynes MD  Colon and Rectal Surgery Attending  Department of Surgery  [facility]    PLEASE SEE NOTE BELOW FOR PHYSICAL EXAMINATION, REVIEW OF SYSTEMS, AND OTHER HISTORY.  -------------------------------------------------------------------------------------------------------------------    Medical history:  Past Medical History   Diagnosis Date     ADD (attention deficit disorder)      Anorexia nervosa with bulimia      history of; on Topamax     Anxiety      Borderline personality disorder      Depression      H/O self-harm      Lives in independent group home      due to debilitating mental illness     Migraine without aura      no known triggers; on Topamax bid and Imitrex PRN     Morbid obesity (H)      PTSD (post-traumatic stress disorder)        Surgical history:  Past Surgical History   Procedure Laterality Date     Mammoplasty reduction Bilateral      Release carpal tunnel Bilateral      Knee surgery Right      removed a small tissue mass from knee     Head & neck surgery  age 7     lymph nodes removed from neck; benign     Laparoscopic ablation endometriosis       Hc remove fecal impaction or fb w anesthesia N/A 12/18/2016     Procedure: REMOVE FECAL IMPACTION/FOREIGN BODY UNDER ANESTHESIA;  Surgeon: Soham Cano MD;  Location:  OR     Exam under anesthesia anus N/A 1/10/2017     Procedure: EXAM UNDER ANESTHESIA ANUS;  Surgeon: Annmarie Haynes MD;  [facility]: [facility] OR     Laparotomy exploratory N/A 1/10/2017     Procedure: LAPAROTOMY EXPLORATORY;  Surgeon: Annmarie Haynes MD;  [facility]     Sigmoidoscopy flexible N/A 1/10/2017     Procedure: SIGMOIDOSCOPY FLEXIBLE;  Surgeon: Annmarie Haynes MD;  Location: [facility] OR       Problem list:    Patient Active Problem List    Diagnosis Date Noted     Cervical  radiculitis 02/13/2017     Priority: Medium     Rectal foreign body 01/11/2017     Priority: Medium     Migraine without aura      Priority: Medium     no known triggers; on Topamax bid and Imitrex PRN       Depression      Priority: Medium     Borderline personality disorder      Priority: Medium     Anxiety      Priority: Medium     H/O self-harm      Priority: Medium     ADD (attention deficit disorder)      Priority: Medium     PTSD (post-traumatic stress disorder)      Priority: Medium     Morbid obesity (H)      Priority: Medium       Medications:  Current Outpatient Prescriptions   Medication Sig Dispense Refill     lidocaine (XYLOCAINE) 5 % ointment Apply topically 3 times daily as needed for moderate pain       senna-docusate (SENOKOT-S;PERICOLACE) 8.6-50 MG per tablet Take 2 tablets by mouth daily as needed for constipation       cyclobenzaprine (FLEXERIL) 5 MG tablet Take 2 tablets (10 mg) by mouth 3 times daily as needed for muscle spasms 42 tablet 0     polyethylene glycol (MIRALAX/GLYCOLAX) powder Take 17 g by mouth daily as needed for constipation 510 g 3     SUMATRIPTAN SUCCINATE PO Take 50 mg by mouth once as needed for migraine       TOPIRAMATE PO Take 50 mg by mouth in the morning and 100 mg by mouth in the evening       ibuprofen (ADVIL/MOTRIN) 200 MG tablet Take 3 tablets (600 mg) by mouth every 6 hours as needed for other (Headache) 100 tablet      ondansetron (ZOFRAN) 4 MG tablet Take 1 tablet (4 mg) by mouth every 6 hours 8 tablet 0     Desvenlafaxine Succinate (PRISTIQ PO) Take 100 mg by mouth daily       Acetaminophen (TYLENOL PO) Take 1,000 mg by mouth every 6 hours as needed        Cholecalciferol (VITAMIN D3 PO) Take 5,000 Units by mouth daily          Allergies:  Allergies   Allergen Reactions     Ancef [Cefazolin Sodium]      Augmentin Swelling     Blood-Group Specific Substance Other (See Comments)     Patient has an anti-Cw and non-specific antibodies. Blood product orders may be  "delayed. Draw one red top and two purple top tubes for all type/screen/crossmatch orders.     Hydrocodone Nausea and Vomiting     vomiting for days     Influenza Vaccines Other (See Comments)     Oseltamivir Hives     med stopped, PN: med stopped     Vicodin [Hydrocodone-Acetaminophen] Nausea and Vomiting     Cephalosporins Rash       Family history:  Family History   Problem Relation Age of Onset     Type 2 Diabetes Maternal Grandmother      Type 2 Diabetes Paternal Grandmother      Myocardial Infarction No family hx of      CEREBROVASCULAR DISEASE Father 53     Coronary Artery Disease Early Onset No family hx of      Breast Cancer Paternal Grandmother      Ovarian Cancer No family hx of      Colon Cancer No family hx of        Social history:  Social History   Substance Use Topics     Smoking status: Never Smoker     Smokeless tobacco: Never Used     Alcohol use No     Marital status: single.    Nursing Notes:   Agatha Ervin LPN  2/15/2017  8:31 AM  Signed  No chief complaint on file.      Vitals:    02/15/17 0828   BP: 139/76   Pulse: 90   Temp: 98.4  F (36.9  C)   TempSrc: Oral   Weight: 228 lb 1.6 oz   Height: 5' 2\"       Body mass index is 41.72 kg/(m^2).  Agatha HAYS LPN                          Physical Examination:  /76  Pulse 90  Temp 98.4  F (36.9  C) (Oral)  Ht 5' 2\"  Wt 228 lb 1.6 oz  BMI 41.72 kg/m2  General: Alert, oriented, in no acute distress, sitting comfortably    Abdomen: obese, soft, nondistended, nontender and palpation. Midline incision well healed  Extremities:  warm, dry, no edema       Again, thank you for allowing me to participate in the care of your patient.      Sincerely,    Annmarie Haynes MD      "

## 2017-02-17 ENCOUNTER — THERAPY VISIT (OUTPATIENT)
Dept: PHYSICAL THERAPY | Facility: CLINIC | Age: 27
End: 2017-02-17
Payer: MEDICAID

## 2017-02-17 DIAGNOSIS — M54.12 CERVICAL RADICULITIS: ICD-10-CM

## 2017-02-17 PROCEDURE — 97530 THERAPEUTIC ACTIVITIES: CPT | Mod: GP | Performed by: PHYSICAL THERAPIST

## 2017-02-17 PROCEDURE — 97110 THERAPEUTIC EXERCISES: CPT | Mod: GP | Performed by: PHYSICAL THERAPIST

## 2017-02-17 PROCEDURE — 97112 NEUROMUSCULAR REEDUCATION: CPT | Mod: GP | Performed by: PHYSICAL THERAPIST

## 2017-02-19 NOTE — PROGRESS NOTES
"Colon and Rectal Surgery Postoperative Clinic Note    RE: Nevin Alvarado  : 1990  MOR: 2/15/2017    Nevin Alvarado is a very pleasant 26 year old female with a history of borderline personality disorder, anxiety, ADD, PTSD, living in a group home who presented with a rectal foreign body and is status post exam under anesthesia of the anus, flexible sigmoidoscopy, exploratory laparotomy with removal of rectal foreign body. No enterotomy or bowel resection was necessary. She was discharged to home on 2017 and presents today for follow-up.    Interval history: Nevin has been doing well. She has some generalized body aches. She has some incisional pain but denies any deep abdominal pain. She is having normal, soft \"balls\" of stool with the use of daily MiraLAX and senna. She is voiding normally. She denies any bleeding or burning with urination. She has had vaginal bleeding since surgery and has an IUD. This has not previously occurred.     PLEASE SEE NOTE BELOW FOR PHYSICAL EXAMINATION, REVIEW OF SYSTEMS, AND OTHER HISTORY.    Assessment/Plan:  26 year old female status post exam under anesthesia of the anus, flexible sigmoidoscopy, exploratory laparotomy with removal of rectal foreign body.    1- Post-op: having a good recovery. No issues from this perspective. I advised her to wear her belt line lower to avoid a hypertrophic scar. Overall, she looks well. She will avoid strenuous activity for a total of 8 weeks after surgery.    2 - Vaginal bleeding with an IUD: she will call to get in with her gynecologist in the next few weeks. This has not been ongoing for about a month.     This is a postoperative visit. Total time was 25 minutes, over 50% was spent counseling the patient.     Annmarie Haynes MD  Colon and Rectal Surgery Attending  Department of Surgery  [facility]    PLEASE SEE NOTE BELOW FOR PHYSICAL EXAMINATION, REVIEW OF SYSTEMS, AND OTHER " HISTORY.  -------------------------------------------------------------------------------------------------------------------    Medical history:  Past Medical History   Diagnosis Date     ADD (attention deficit disorder)      Anorexia nervosa with bulimia      history of; on Topamax     Anxiety      Borderline personality disorder      Depression      H/O self-harm      Lives in independent group home      due to debilitating mental illness     Migraine without aura      no known triggers; on Topamax bid and Imitrex PRN     Morbid obesity (H)      PTSD (post-traumatic stress disorder)        Surgical history:  Past Surgical History   Procedure Laterality Date     Mammoplasty reduction Bilateral      Release carpal tunnel Bilateral      Knee surgery Right      removed a small tissue mass from knee     Head & neck surgery  age 7     lymph nodes removed from neck; benign     Laparoscopic ablation endometriosis       Hc remove fecal impaction or fb w anesthesia N/A 12/18/2016     Procedure: REMOVE FECAL IMPACTION/FOREIGN BODY UNDER ANESTHESIA;  Surgeon: Soham Cano MD;  Location: [facility] OR     Exam under anesthesia anus N/A 1/10/2017     Procedure: EXAM UNDER ANESTHESIA ANUS;  Surgeon: Annmarie Haynes MD;  [facility]     Laparotomy exploratory N/A 1/10/2017     Procedure: LAPAROTOMY EXPLORATORY;  Surgeon: Annmarie Haynes MD;  [facility]     Sigmoidoscopy flexible N/A 1/10/2017     Procedure: SIGMOIDOSCOPY FLEXIBLE;  Surgeon: Annmarie Haynes MD;  [facility]       Problem list:    Patient Active Problem List    Diagnosis Date Noted     Cervical radiculitis 02/13/2017     Priority: Medium     Rectal foreign body 01/11/2017     Priority: Medium     Migraine without aura      Priority: Medium     no known triggers; on Topamax bid and Imitrex PRN       Depression      Priority: Medium     Borderline personality disorder      Priority: Medium     Anxiety      Priority: Medium      H/O self-harm      Priority: Medium     ADD (attention deficit disorder)      Priority: Medium     PTSD (post-traumatic stress disorder)      Priority: Medium     Morbid obesity (H)      Priority: Medium       Medications:  Current Outpatient Prescriptions   Medication Sig Dispense Refill     lidocaine (XYLOCAINE) 5 % ointment Apply topically 3 times daily as needed for moderate pain       senna-docusate (SENOKOT-S;PERICOLACE) 8.6-50 MG per tablet Take 2 tablets by mouth daily as needed for constipation       cyclobenzaprine (FLEXERIL) 5 MG tablet Take 2 tablets (10 mg) by mouth 3 times daily as needed for muscle spasms 42 tablet 0     polyethylene glycol (MIRALAX/GLYCOLAX) powder Take 17 g by mouth daily as needed for constipation 510 g 3     SUMATRIPTAN SUCCINATE PO Take 50 mg by mouth once as needed for migraine       TOPIRAMATE PO Take 50 mg by mouth in the morning and 100 mg by mouth in the evening       ibuprofen (ADVIL/MOTRIN) 200 MG tablet Take 3 tablets (600 mg) by mouth every 6 hours as needed for other (Headache) 100 tablet      ondansetron (ZOFRAN) 4 MG tablet Take 1 tablet (4 mg) by mouth every 6 hours 8 tablet 0     Desvenlafaxine Succinate (PRISTIQ PO) Take 100 mg by mouth daily       Acetaminophen (TYLENOL PO) Take 1,000 mg by mouth every 6 hours as needed        Cholecalciferol (VITAMIN D3 PO) Take 5,000 Units by mouth daily          Allergies:  Allergies   Allergen Reactions     Ancef [Cefazolin Sodium]      Augmentin Swelling     Blood-Group Specific Substance Other (See Comments)     Patient has an anti-Cw and non-specific antibodies. Blood product orders may be delayed. Draw one red top and two purple top tubes for all type/screen/crossmatch orders.     Hydrocodone Nausea and Vomiting     vomiting for days     Influenza Vaccines Other (See Comments)     Oseltamivir Hives     med stopped, PN: med stopped     Vicodin [Hydrocodone-Acetaminophen] Nausea and Vomiting     Cephalosporins Rash  "      Family history:  Family History   Problem Relation Age of Onset     Type 2 Diabetes Maternal Grandmother      Type 2 Diabetes Paternal Grandmother      Myocardial Infarction No family hx of      CEREBROVASCULAR DISEASE Father 53     Coronary Artery Disease Early Onset No family hx of      Breast Cancer Paternal Grandmother      Ovarian Cancer No family hx of      Colon Cancer No family hx of        Social history:  Social History   Substance Use Topics     Smoking status: Never Smoker     Smokeless tobacco: Never Used     Alcohol use No     Marital status: single.    Nursing Notes:   Agatha Ervin LPN  2/15/2017  8:31 AM  Signed  No chief complaint on file.      Vitals:    02/15/17 0828   BP: 139/76   Pulse: 90   Temp: 98.4  F (36.9  C)   TempSrc: Oral   Weight: 228 lb 1.6 oz   Height: 5' 2\"       Body mass index is 41.72 kg/(m^2).  Agatha AGRAWALGRISELDA Blake                          Physical Examination:  /76  Pulse 90  Temp 98.4  F (36.9  C) (Oral)  Ht 5' 2\"  Wt 228 lb 1.6 oz  BMI 41.72 kg/m2  General: Alert, oriented, in no acute distress, sitting comfortably    Abdomen: obese, soft, nondistended, nontender and palpation. Midline incision well healed  Extremities:  warm, dry, no edema     "

## 2017-02-21 ENCOUNTER — THERAPY VISIT (OUTPATIENT)
Dept: PHYSICAL THERAPY | Facility: CLINIC | Age: 27
End: 2017-02-21
Payer: MEDICAID

## 2017-02-21 DIAGNOSIS — M54.12 CERVICAL RADICULITIS: ICD-10-CM

## 2017-02-21 PROCEDURE — 97112 NEUROMUSCULAR REEDUCATION: CPT | Mod: GP | Performed by: PHYSICAL THERAPIST

## 2017-02-21 PROCEDURE — 97110 THERAPEUTIC EXERCISES: CPT | Mod: GP | Performed by: PHYSICAL THERAPIST

## 2017-02-21 NOTE — PROGRESS NOTES
Subjective:    HPI                    Objective:    System    Physical Exam    General     ROS    Assessment/Plan:      DISCHARGE REPORT    Progress reporting period is from 2-13-17 to 2-21-17.       SUBJECTIVE  Subjective changes noted by patient:  .  Subjective: Minimal pain--compliance fair.  Aware of posture, attempting to improve.     Current pain level is 0/10  .     Previous pain level was  6/10  .   Changes in function:  Yes (See Goal flowsheet attached for changes in current functional level)  Adverse reaction to treatment or activity: None    OBJECTIVE  Changes noted in objective findings:  The objective findings below are from DOS 2-21-17.  Objective: NDI decr from 56 to 28; Cx ROM: in wnl and painfree; poor posture, decr flexibility in pec minor/major, poor scap stab neuro control.      ASSESSMENT/PLAN  Updated problem list and treatment plan: Diagnosis 1:  Cervical radiculitis  Pain -  self management, education, directional preference exercise and home program  Decreased ROM/flexibility - manual therapy and therapeutic exercise  Decreased strength - therapeutic exercise and therapeutic activities  Decreased function - therapeutic activities  Impaired posture - neuro re-education  STG/LTGs have been met or progress has been made towards goals:  Yes (See Goal flow sheet completed today.)  Assessment of Progress: The patient has met all of their long term goals.  Self Management Plans:  Patient is independent in a home treatment program.  Patient is independent in self management of symptoms.  I have re-evaluated this patient and find that the nature, scope, duration and intensity of the therapy is appropriate for the medical condition of the patient.  Nevin continues to require the following intervention to meet STG and LTG's:  PT intervention is no longer required to meet STG/LTG.    Recommendations:  This patient is ready to be discharged from therapy and continue their home treatment  program.    Please refer to the daily flowsheet for treatment today, total treatment time and time spent performing 1:1 timed codes.

## 2017-02-21 NOTE — MR AVS SNAPSHOT
After Visit Summary   2/21/2017    Nevin Alvarado    MRN: [id number]           Patient Information     Date Of Birth          1990        Visit Information        Provider Department      2/21/2017 10:20 AM Diana Mullen, PT [facility]        Today's Diagnoses     Cervical radiculitis           Follow-ups after your visit        Who to contact     If you have questions or need follow up information about today's clinic visit or your schedule please contact [facility] directly at [phone].  Normal or non-critical lab and imaging results will be communicated to you by Triplhart, letter or phone within 4 business days after the clinic has received the results. If you do not hear from us within 7 days, please contact the clinic through Mobilepolicet or phone. If you have a critical or abnormal lab result, we will notify you by phone as soon as possible.  Submit refill requests through DrivenBI or call your pharmacy and they will forward the refill request to us. Please allow 3 business days for your refill to be completed.          Additional Information About Your Visit        MyChart Information     [employer] gives you secure access to your electronic health record. If you see a primary care provider, you can also send messages to your care team and make appointments. If you have questions, please call your primary care clinic.  If you do not have a primary care provider, please call [phone] and they will assist you.        Care EveryWhere ID     This is your Care EveryWhere ID. This could be used by other organizations to access your [place] medical records  [id number]         Blood Pressure from Last 3 Encounters:   02/15/17 139/76   02/07/17 118/74   02/05/17 107/42    Weight from Last 3 Encounters:   02/15/17 103.5 kg (228 lb 1.6 oz)   02/07/17 101.1 kg (222 lb 12.8 oz)   02/05/17 100.7 kg (222 lb)              We Performed the Following     NEUROMUSCULAR RE-EDUCATION      THERAPEUTIC EXERCISES        Primary Care Provider Office Phone # Fax #    Sandra Ramos [phone]       [facility] [address] [facility] [address]        Thank you!     Thank you for choosing [facility]  for your care. Our goal is always to provide you with excellent care. Hearing back from our patients is one way we can continue to improve our services. Please take a few minutes to complete the written survey that you may receive in the mail after your visit with us. Thank you!             Your Updated Medication List - Protect others around you: Learn how to safely use, store and throw away your medicines at www.disposemymeds.org.          This list is accurate as of: 2/21/17 10:57 AM.  Always use your most recent med list.                   Brand Name Dispense Instructions for use    cyclobenzaprine 5 MG tablet    FLEXERIL    42 tablet    Take 2 tablets (10 mg) by mouth 3 times daily as needed for muscle spasms       ibuprofen 200 MG tablet    ADVIL/MOTRIN    100 tablet    Take 3 tablets (600 mg) by mouth every 6 hours as needed for other (Headache)       lidocaine 5 % ointment    XYLOCAINE     Apply topically 3 times daily as needed for moderate pain       ondansetron 4 MG tablet    ZOFRAN    8 tablet    Take 1 tablet (4 mg) by mouth every 6 hours       polyethylene glycol powder    MIRALAX/GLYCOLAX    510 g    Take 17 g by mouth daily as needed for constipation       PRISTIQ PO      Take 100 mg by mouth daily       senna-docusate 8.6-50 MG per tablet    SENOKOT-S;PERICOLACE     Take 2 tablets by mouth daily as needed for constipation       SUMATRIPTAN SUCCINATE PO      Take 50 mg by mouth once as needed for migraine       TOPIRAMATE PO      Take 50 mg by mouth in the morning and 100 mg by mouth in the evening       TYLENOL PO      Take 1,000 mg by mouth every 6 hours as needed       VITAMIN D3 PO      Take 5,000 Units by mouth daily

## 2017-02-22 ENCOUNTER — TELEPHONE (OUTPATIENT)
Dept: SURGERY | Facility: CLINIC | Age: 27
End: 2017-02-22

## 2017-02-22 ENCOUNTER — CARE COORDINATION (OUTPATIENT)
Dept: SURGERY | Facility: CLINIC | Age: 27
End: 2017-02-22

## 2017-02-22 NOTE — PROGRESS NOTES
Pt states that, d/t her insurance, she is unable to make f/u GYN appt until the end of March.   She prefers to see this MD because that is who put IUD in & pt has seen that doctor for some time.  She plans on getting this appt scheduled soon.  She does state that her period has stopped.  She has our clinic's # to call if she changes her mind.

## 2017-02-22 NOTE — TELEPHONE ENCOUNTER
Patient called triage because she is experiencing pain around her surgical site. I set her up to see Tea Ramon NP tomorrow. I confirmed time, date, location and provider.

## 2017-02-23 ENCOUNTER — OFFICE VISIT (OUTPATIENT)
Dept: SURGERY | Facility: CLINIC | Age: 27
End: 2017-02-23

## 2017-02-23 VITALS
DIASTOLIC BLOOD PRESSURE: 74 MMHG | HEIGHT: 62 IN | BODY MASS INDEX: 41.46 KG/M2 | TEMPERATURE: 98.3 F | SYSTOLIC BLOOD PRESSURE: 113 MMHG | OXYGEN SATURATION: 96 % | HEART RATE: 79 BPM | WEIGHT: 225.3 LBS

## 2017-02-23 DIAGNOSIS — T18.5XXD: ICD-10-CM

## 2017-02-23 DIAGNOSIS — Z09 FOLLOW-UP EXAMINATION FOLLOWING SURGERY: Primary | ICD-10-CM

## 2017-02-23 ASSESSMENT — PAIN SCALES - GENERAL: PAINLEVEL: SEVERE PAIN (6)

## 2017-02-23 NOTE — NURSING NOTE
"Chief Complaint   Patient presents with     Surgical Followup     Post op, pain near incision       Vitals:    02/23/17 1006   BP: 113/74   BP Location: Left arm   Pulse: 79   Temp: 98.3  F (36.8  C)   TempSrc: Oral   SpO2: 96%   Weight: 225 lb 4.8 oz   Height: 5' 2\"       Body mass index is 41.21 kg/(m^2).    Attila Camacho CMA                          "

## 2017-02-23 NOTE — LETTER
2017      RE: Nevin Alvarado  [address]  [place] [address]       Colon and Rectal Surgery Postoperative Clinic Note    RE: Nevin Alvarado  : 1990  MOR: 2017    Nevin Alvarado is a very pleasant 26 year old female with a history of borderline personality disorder, anxiety, ADD, PTSD, living in a group home who presented with a rectal foreign body and is status post exam under anesthesia of the anus, flexible sigmoidoscopy, exploratory laparotomy with removal of rectal foreign body. No enterotomy or bowel resection was necessary. She was discharged to home on 2017 and presents today with abdominal pain.    Interval history: Nevin has been doing well but continues to have some abdominal pain with movement. She notes RLQ abdominal pain with bending, if her pant line is too low, when she wears a seat belt, and twisting on her side to get into bed. The pain resolves quickly with rest but she is concerned that she is not fully healed yet. She states that she has tylenol and ibuprofen scheduled but that her caregivers are not giving this to her and telling her that she should not be having pain anymore and should not need these.    PLEASE SEE NOTE BELOW FOR PHYSICAL EXAMINATION, REVIEW OF SYSTEMS, AND OTHER HISTORY.    Assessment/Plan:  26 year old female status post exam under anesthesia of the anus, flexible sigmoidoscopy, exploratory laparotomy with removal of rectal foreign body.    I think her pain is likely muscular as it is associated only with movement and resolves at rest. Reassured her that it may take a few more weeks until she is fully healed. Recommended tylenol and ibuprofen use for pain and encouraged her to wear a binder on her abdomen and avoid having her pant line over the incision as this makes her symptoms worse. If pain worsens or does not improve over the next week, contact the clinic.  Patient's questions were answered to her  stated satisfaction and she is in agreement with this plan.    -------------------------------------------------------------------------------------------------------------------    Medical history:  Past Medical History   Diagnosis Date     ADD (attention deficit disorder)      Anorexia nervosa with bulimia      history of; on Topamax     Anxiety      Borderline personality disorder      Depression      H/O self-harm      Lives in independent group home      due to debilitating mental illness     Migraine without aura      no known triggers; on Topamax bid and Imitrex PRN     Morbid obesity (H)      PTSD (post-traumatic stress disorder)        Surgical history:  Past Surgical History   Procedure Laterality Date     Mammoplasty reduction Bilateral      Release carpal tunnel Bilateral      Knee surgery Right      removed a small tissue mass from knee     Head & neck surgery  age 7     lymph nodes removed from neck; benign     Laparoscopic ablation endometriosis       Hc remove fecal impaction or fb w anesthesia N/A 12/18/2016     Procedure: REMOVE FECAL IMPACTION/FOREIGN BODY UNDER ANESTHESIA;  Surgeon: Soham Cano MD;  Location: [facility]     Exam under anesthesia anus N/A 1/10/2017     Procedure: EXAM UNDER ANESTHESIA ANUS;  Surgeon: Annmarie Haynes MD;  [facility]     Laparotomy exploratory N/A 1/10/2017     Procedure: LAPAROTOMY EXPLORATORY;  Surgeon: Annmarie Haynes MD;  [facility]     Sigmoidoscopy flexible N/A 1/10/2017     Procedure: SIGMOIDOSCOPY FLEXIBLE;  Surgeon: Annmarie Haynes MD;  [facility]       Problem list:    Patient Active Problem List    Diagnosis Date Noted     Rectal foreign body 01/11/2017     Priority: Medium     Migraine without aura      Priority: Medium     no known triggers; on Topamax bid and Imitrex PRN       Depression      Priority: Medium     Borderline personality disorder      Priority: Medium     Anxiety      Priority: Medium      H/O self-harm      Priority: Medium     ADD (attention deficit disorder)      Priority: Medium     PTSD (post-traumatic stress disorder)      Priority: Medium     Morbid obesity (H)      Priority: Medium       Medications:  Current Outpatient Prescriptions   Medication Sig Dispense Refill     lidocaine (XYLOCAINE) 5 % ointment Apply topically 3 times daily as needed for moderate pain       senna-docusate (SENOKOT-S;PERICOLACE) 8.6-50 MG per tablet Take 2 tablets by mouth daily as needed for constipation       cyclobenzaprine (FLEXERIL) 5 MG tablet Take 2 tablets (10 mg) by mouth 3 times daily as needed for muscle spasms 42 tablet 0     polyethylene glycol (MIRALAX/GLYCOLAX) powder Take 17 g by mouth daily as needed for constipation 510 g 3     SUMATRIPTAN SUCCINATE PO Take 50 mg by mouth once as needed for migraine       TOPIRAMATE PO Take 50 mg by mouth in the morning and 100 mg by mouth in the evening       ibuprofen (ADVIL/MOTRIN) 200 MG tablet Take 3 tablets (600 mg) by mouth every 6 hours as needed for other (Headache) 100 tablet      ondansetron (ZOFRAN) 4 MG tablet Take 1 tablet (4 mg) by mouth every 6 hours 8 tablet 0     Desvenlafaxine Succinate (PRISTIQ PO) Take 100 mg by mouth daily       Acetaminophen (TYLENOL PO) Take 1,000 mg by mouth every 6 hours as needed        Cholecalciferol (VITAMIN D3 PO) Take 5,000 Units by mouth daily          Allergies:  Allergies   Allergen Reactions     Ancef [Cefazolin Sodium]      Augmentin Swelling     Blood-Group Specific Substance Other (See Comments)     Patient has an anti-Cw and non-specific antibodies. Blood product orders may be delayed. Draw one red top and two purple top tubes for all type/screen/crossmatch orders.     Hydrocodone Nausea and Vomiting     vomiting for days     Influenza Vaccines Other (See Comments)     Oseltamivir Hives     med stopped, PN: med stopped     Vicodin [Hydrocodone-Acetaminophen] Nausea and Vomiting     Cephalosporins Rash  "      Family history:  Family History   Problem Relation Age of Onset     Type 2 Diabetes Maternal Grandmother      Type 2 Diabetes Paternal Grandmother      Breast Cancer Paternal Grandmother      CEREBROVASCULAR DISEASE Father 53     Myocardial Infarction No family hx of      Coronary Artery Disease Early Onset No family hx of      Ovarian Cancer No family hx of      Colon Cancer No family hx of        Social history:  Social History   Substance Use Topics     Smoking status: Never Smoker     Smokeless tobacco: Never Used     Alcohol use No     Marital status: single.    Nursing Notes:   Attila Camacho CMA  2/23/2017 10:09 AM  Signed  Chief Complaint   Patient presents with     Surgical Followup     Post op, pain near incision       Vitals:    02/23/17 1006   BP: 113/74   BP Location: Left arm   Pulse: 79   Temp: 98.3  F (36.8  C)   TempSrc: Oral   SpO2: 96%   Weight: 225 lb 4.8 oz   Height: 5' 2\"       Body mass index is 41.21 kg/(m^2).    Attila Camacho CMA                             Physical Examination:  /74 (BP Location: Left arm)  Pulse 79  Temp 98.3  F (36.8  C) (Oral)  Ht 5' 2\"  Wt 225 lb 4.8 oz  SpO2 96%  BMI 41.21 kg/m2  General: Alert, oriented, in no acute distress, sitting comfortably    Abdomen: obese, soft, nondistended, nontender on palpation. Midline incision well healed  Extremities:  warm, dry, no edema       HU Montes CNP      "

## 2017-02-23 NOTE — MR AVS SNAPSHOT
After Visit Summary   2/23/2017    Nevin Alvarado    MRN: [id number]           Patient Information     Date Of Birth          1990        Visit Information        Provider Department      2/23/2017 10:30 AM Tea Griggs APRN CNP [facility] Colon and Rectal [facility]        Today's Diagnoses     Follow-up examination following surgery    -  1    Rectal foreign body, subsequent encounter           Follow-ups after your visit        Your next 10 appointments already scheduled     Feb 28, 2017  8:45 AM [facility]   [facility]    [address] [address] [phone]           Bring a current list of meds and any records pertaining to this visit.  For Physicals, please bring immunization records and any forms needing to be filled out.  Please arrive 10 minutes early to complete paperwork.              Who to contact     Please call your clinic at [phone] to:    Ask questions about your health    Make or cancel appointments    Discuss your medicines    Learn about your test results    Speak to your doctor   If you have compliments or concerns about an experience at your clinic, or if you wish to file a complaint, please contact [facility] Patient Relations at [phone] or email us at [email or web address]         Additional Information About Your Visit        MyChart Information     MedEncent[employer] gives you secure access to your electronic health record. If you see a primary care provider, you can also send messages to your care team and make appointments. If you have questions, please call your primary care clinic.  If you do not have a primary care provider, please call [phone] and they will assist you.      [employer] is an electronic gateway that provides easy, online access to your medical records. With [employer], you  "can request a clinic appointment, read your test results, renew a prescription or communicate with your care team.     To access your existing account, please contact your [facility] or call [phone] for assistance.        Care EveryWhere ID     This is your Care EveryWhere ID. This could be used by other organizations to access your [place] medical records  [id number]        Your Vitals Were     Pulse Temperature Height Pulse Oximetry BMI (Body Mass Index)       79 98.3  F (36.8  C) (Oral) 5' 2\" 96% 41.21 kg/m2        Blood Pressure from Last 3 Encounters:   02/23/17 113/74   02/15/17 139/76   02/07/17 118/74    Weight from Last 3 Encounters:   02/23/17 225 lb 4.8 oz   02/15/17 228 lb 1.6 oz   02/07/17 222 lb 12.8 oz              Today, you had the following     No orders found for display       Primary Care Provider Office Phone # Fax #    Sandra Ramos [phone]       [facility] [address] [place] [id number]        Thank you!     Thank you for choosing [facility] COLON AND RECTAL SURGERY  for your care. Our goal is always to provide you with excellent care. Hearing back from our patients is one way we can continue to improve our services. Please take a few minutes to complete the written survey that you may receive in the mail after your visit with us. Thank you!             Your Updated Medication List - Protect others around you: Learn how to safely use, store and throw away your medicines at www.disposemymeds.org.          This list is accurate as of: 2/23/17 11:46 AM.  Always use your most recent med list.                   Brand Name Dispense Instructions for use    cyclobenzaprine 5 MG tablet    FLEXERIL    42 tablet    Take 2 tablets (10 mg) by mouth 3 times daily as needed for muscle spasms       ibuprofen 200 MG tablet    ADVIL/MOTRIN    100 tablet    Take 3 tablets (600 mg) by mouth every 6 hours as needed for other " (Headache)       lidocaine 5 % ointment    XYLOCAINE     Apply topically 3 times daily as needed for moderate pain       ondansetron 4 MG tablet    ZOFRAN    8 tablet    Take 1 tablet (4 mg) by mouth every 6 hours       polyethylene glycol powder    MIRALAX/GLYCOLAX    510 g    Take 17 g by mouth daily as needed for constipation       PRISTIQ PO      Take 100 mg by mouth daily       senna-docusate 8.6-50 MG per tablet    SENOKOT-S;PERICOLACE     Take 2 tablets by mouth daily as needed for constipation       SUMATRIPTAN SUCCINATE PO      Take 50 mg by mouth once as needed for migraine       TOPIRAMATE PO      Take 50 mg by mouth in the morning and 100 mg by mouth in the evening       TYLENOL PO      Take 1,000 mg by mouth every 6 hours as needed       VITAMIN D3 PO      Take 5,000 Units by mouth daily

## 2017-02-23 NOTE — PROGRESS NOTES
Colon and Rectal Surgery Postoperative Clinic Note    RE: Nevin Alvarado  : 1990  MOR: 2017    Nevin Alvarado is a very pleasant 26 year old female with a history of borderline personality disorder, anxiety, ADD, PTSD, living in a group home who presented with a rectal foreign body and is status post exam under anesthesia of the anus, flexible sigmoidoscopy, exploratory laparotomy with removal of rectal foreign body. No enterotomy or bowel resection was necessary. She was discharged to home on 2017 and presents today with abdominal pain.    Interval history: Nevin has been doing well but continues to have some abdominal pain with movement. She notes RLQ abdominal pain with bending, if her pant line is too low, when she wears a seat belt, and twisting on her side to get into bed. The pain resolves quickly with rest but she is concerned that she is not fully healed yet. She states that she has tylenol and ibuprofen scheduled but that her caregivers are not giving this to her and telling her that she should not be having pain anymore and should not need these.    PLEASE SEE NOTE BELOW FOR PHYSICAL EXAMINATION, REVIEW OF SYSTEMS, AND OTHER HISTORY.    Assessment/Plan:  26 year old female status post exam under anesthesia of the anus, flexible sigmoidoscopy, exploratory laparotomy with removal of rectal foreign body.    I think her pain is likely muscular as it is associated only with movement and resolves at rest. Reassured her that it may take a few more weeks until she is fully healed. Recommended tylenol and ibuprofen use for pain and encouraged her to wear a binder on her abdomen and avoid having her pant line over the incision as this makes her symptoms worse. If pain worsens or does not improve over the next week, contact the clinic.  Patient's questions were answered to her stated satisfaction and she is in agreement with this  plan.    -------------------------------------------------------------------------------------------------------------------    Medical history:  Past Medical History   Diagnosis Date     ADD (attention deficit disorder)      Anorexia nervosa with bulimia      history of; on Topamax     Anxiety      Borderline personality disorder      Depression      H/O self-harm      Lives in independent group home      due to debilitating mental illness     Migraine without aura      no known triggers; on Topamax bid and Imitrex PRN     Morbid obesity (H)      PTSD (post-traumatic stress disorder)        Surgical history:  Past Surgical History   Procedure Laterality Date     Mammoplasty reduction Bilateral      Release carpal tunnel Bilateral      Knee surgery Right      removed a small tissue mass from knee     Head & neck surgery  age 7     lymph nodes removed from neck; benign     Laparoscopic ablation endometriosis       Hc remove fecal impaction or fb w anesthesia N/A 12/18/2016     Procedure: REMOVE FECAL IMPACTION/FOREIGN BODY UNDER ANESTHESIA;  Surgeon: Soham Cano MD;  Location: [facility] OR     Exam under anesthesia anus N/A 1/10/2017     Procedure: EXAM UNDER ANESTHESIA ANUS;  Surgeon: Annmarie Haynes MD;  [facility]     Laparotomy exploratory N/A 1/10/2017     Procedure: LAPAROTOMY EXPLORATORY;  Surgeon: Annmarie Haynes MD;  [facility]     Sigmoidoscopy flexible N/A 1/10/2017     Procedure: SIGMOIDOSCOPY FLEXIBLE;  Surgeon: Annmarie Haynes MD;  [facility]       Problem list:    Patient Active Problem List    Diagnosis Date Noted     Rectal foreign body 01/11/2017     Priority: Medium     Migraine without aura      Priority: Medium     no known triggers; on Topamax bid and Imitrex PRN       Depression      Priority: Medium     Borderline personality disorder      Priority: Medium     Anxiety      Priority: Medium     H/O self-harm      Priority: Medium     ADD (attention  deficit disorder)      Priority: Medium     PTSD (post-traumatic stress disorder)      Priority: Medium     Morbid obesity (H)      Priority: Medium       Medications:  Current Outpatient Prescriptions   Medication Sig Dispense Refill     lidocaine (XYLOCAINE) 5 % ointment Apply topically 3 times daily as needed for moderate pain       senna-docusate (SENOKOT-S;PERICOLACE) 8.6-50 MG per tablet Take 2 tablets by mouth daily as needed for constipation       cyclobenzaprine (FLEXERIL) 5 MG tablet Take 2 tablets (10 mg) by mouth 3 times daily as needed for muscle spasms 42 tablet 0     polyethylene glycol (MIRALAX/GLYCOLAX) powder Take 17 g by mouth daily as needed for constipation 510 g 3     SUMATRIPTAN SUCCINATE PO Take 50 mg by mouth once as needed for migraine       TOPIRAMATE PO Take 50 mg by mouth in the morning and 100 mg by mouth in the evening       ibuprofen (ADVIL/MOTRIN) 200 MG tablet Take 3 tablets (600 mg) by mouth every 6 hours as needed for other (Headache) 100 tablet      ondansetron (ZOFRAN) 4 MG tablet Take 1 tablet (4 mg) by mouth every 6 hours 8 tablet 0     Desvenlafaxine Succinate (PRISTIQ PO) Take 100 mg by mouth daily       Acetaminophen (TYLENOL PO) Take 1,000 mg by mouth every 6 hours as needed        Cholecalciferol (VITAMIN D3 PO) Take 5,000 Units by mouth daily          Allergies:  Allergies   Allergen Reactions     Ancef [Cefazolin Sodium]      Augmentin Swelling     Blood-Group Specific Substance Other (See Comments)     Patient has an anti-Cw and non-specific antibodies. Blood product orders may be delayed. Draw one red top and two purple top tubes for all type/screen/crossmatch orders.     Hydrocodone Nausea and Vomiting     vomiting for days     Influenza Vaccines Other (See Comments)     Oseltamivir Hives     med stopped, PN: med stopped     Vicodin [Hydrocodone-Acetaminophen] Nausea and Vomiting     Cephalosporins Rash       Family history:  Family History   Problem Relation Age of  "Onset     Type 2 Diabetes Maternal Grandmother      Type 2 Diabetes Paternal Grandmother      Breast Cancer Paternal Grandmother      CEREBROVASCULAR DISEASE Father 53     Myocardial Infarction No family hx of      Coronary Artery Disease Early Onset No family hx of      Ovarian Cancer No family hx of      Colon Cancer No family hx of        Social history:  Social History   Substance Use Topics     Smoking status: Never Smoker     Smokeless tobacco: Never Used     Alcohol use No     Marital status: single.    Nursing Notes:   Attila Camacho CMA  2/23/2017 10:09 AM  Signed  Chief Complaint   Patient presents with     Surgical Followup     Post op, pain near incision       Vitals:    02/23/17 1006   BP: 113/74   BP Location: Left arm   Pulse: 79   Temp: 98.3  F (36.8  C)   TempSrc: Oral   SpO2: 96%   Weight: 225 lb 4.8 oz   Height: 5' 2\"       Body mass index is 41.21 kg/(m^2).    Attila Camacho CMA                             Physical Examination:  /74 (BP Location: Left arm)  Pulse 79  Temp 98.3  F (36.8  C) (Oral)  Ht 5' 2\"  Wt 225 lb 4.8 oz  SpO2 96%  BMI 41.21 kg/m2  General: Alert, oriented, in no acute distress, sitting comfortably    Abdomen: obese, soft, nondistended, nontender on palpation. Midline incision well healed  Extremities:  warm, dry, no edema     "

## 2017-02-28 ENCOUNTER — OFFICE VISIT (OUTPATIENT)
Dept: INTERNAL MEDICINE | Facility: CLINIC | Age: 27
End: 2017-02-28
Payer: MEDICAID

## 2017-02-28 VITALS
WEIGHT: 228.7 LBS | TEMPERATURE: 98.1 F | HEIGHT: 62 IN | HEART RATE: 77 BPM | BODY MASS INDEX: 42.09 KG/M2 | DIASTOLIC BLOOD PRESSURE: 77 MMHG | OXYGEN SATURATION: 95 % | SYSTOLIC BLOOD PRESSURE: 122 MMHG

## 2017-02-28 DIAGNOSIS — R21 RASH: Primary | ICD-10-CM

## 2017-02-28 PROCEDURE — 99213 OFFICE O/P EST LOW 20 MIN: CPT | Performed by: INTERNAL MEDICINE

## 2017-02-28 NOTE — PROGRESS NOTES
"  SUBJECTIVE:                                                      HPI: Nevin Alvarado is a pleasant 26 year old female who presents with a rash:    - ongoing for at least one year  - seems to be progressing over time  - involves lower legs below knees bilaterally  - asymptomatic - no itching or pain    - does not use any specific products on legs  - no rashes elsewhere  - no fevers or chills    - has tried multiple topical prescription creams - doesn't recall names    The medication, allergy, and problem lists have been reviewed and updated as appropriate.     OBJECTIVE:                                                      /77 (BP Location: Left arm, Patient Position: Chair, Cuff Size: Adult Regular)  Pulse 77  Temp 98.1  F (36.7  C) (Oral)  Ht 5' 2\" (1.575 m)  Wt 228 lb 11.2 oz (103.7 kg)  SpO2 95%  BMI 41.83 kg/m2  Constitutional: well-appearing  Integumentary: multiple well-demarcated oval, circular, and irregularly shaped pink, blanching macules and patches over anterior lower legs and dorsum of feet    *see pictures, uploaded today, in Media tab*    ASSESSMENT/PLAN:                                                      (R21) Rash  (primary encounter diagnosis)  Comment: etiology unclear.   Plan: referred to dermatology for further evaluation.     The instructions on the AVS were discussed and explained to the patient. Patient expressed understanding of instructions.    Sandra Ramos MD   [address] [address]  T: [phone], F: [phone]    "

## 2017-02-28 NOTE — MR AVS SNAPSHOT
After Visit Summary   2/28/2017    Nevin Alvarado    MRN: [id number]           Patient Information     Date Of Birth          1990        Visit Information        Provider Department      2/28/2017 8:45 AM Sandra Ramos MD [facility]        Today's Diagnoses     Rash    -  1      Care Instructions    I have placed a dermatology referral for you.    Please schedule an appointment at your earliest convenience - phone number below or can go up to third floor.             Follow-ups after your visit        Additional Services     DERMATOLOGY REFERRAL       Your provider has referred you to: [facility]: [facility] [phone]   [email or web address]    Please be aware that coverage of these services is subject to the terms and limitations of your health insurance plan.  Call member services at your health plan with any benefit or coverage questions.      Please bring the following with you to your appointment:    (1) Any X-Rays, CTs or MRIs which have been performed.  Contact the facility where they were done to arrange for  prior to your scheduled appointment.    (2) List of current medications  (3) This referral request   (4) Any documents/labs given to you for this referral                  Who to contact     If you have questions or need follow up information about today's clinic visit or your schedule please contact [facility] directly at [phone].  Normal or non-critical lab and imaging results will be communicated to you by MyChart, letter or phone within 4 business days after the clinic has received the results. If you do not hear from us within 7 days, please contact the clinic through MyChart or phone. If you have a critical or abnormal lab result, we will notify you by phone as soon as possible.  Submit refill requests through [employer] or call your pharmacy and  "they will forward the refill request to us. Please allow 3 business days for your refill to be completed.          Additional Information About Your Visit        [employer] Information     [employer] gives you secure access to your electronic health record. If you see a primary care provider, you can also send messages to your care team and make appointments. If you have questions, please call your primary care clinic.  If you do not have a primary care provider, please call [phone] and they will assist you.        Care EveryWhere ID     This is your Care EveryWhere ID. This could be used by other organizations to access your [facility] medical records  [id number]        Your Vitals Were     Pulse Temperature Height Pulse Oximetry BMI (Body Mass Index)       77 98.1  F (36.7  C) (Oral) 5' 2\" (1.575 m) 95% 41.83 kg/m2        Blood Pressure from Last 3 Encounters:   02/28/17 122/77   02/23/17 113/74   02/15/17 139/76    Weight from Last 3 Encounters:   02/28/17 228 lb 11.2 oz (103.7 kg)   02/23/17 225 lb 4.8 oz (102.2 kg)   02/15/17 228 lb 1.6 oz (103.5 kg)              We Performed the Following     DERMATOLOGY REFERRAL        Primary Care Provider Office Phone # Fax #    Sandra Ramos [phone]       [facility] [address] [facility] [address]        Thank you!     Thank you for choosing [facility]  for your care. Our goal is always to provide you with excellent care. Hearing back from our patients is one way we can continue to improve our services. Please take a few minutes to complete the written survey that you may receive in the mail after your visit with us. Thank you!             Your Updated Medication List - Protect others around you: Learn how to safely use, store and throw away your medicines at www.disposemymeds.org.          This list is accurate as of: 2/28/17  9:04 AM.  Always use your most recent med list.                   Brand " Name Dispense Instructions for use    cyclobenzaprine 5 MG tablet    FLEXERIL    42 tablet    Take 2 tablets (10 mg) by mouth 3 times daily as needed for muscle spasms       ibuprofen 200 MG tablet    ADVIL/MOTRIN    100 tablet    Take 3 tablets (600 mg) by mouth every 6 hours as needed for other (Headache)       lidocaine 5 % ointment    XYLOCAINE     Apply topically 3 times daily as needed for moderate pain       ondansetron 4 MG tablet    ZOFRAN    8 tablet    Take 1 tablet (4 mg) by mouth every 6 hours       polyethylene glycol powder    MIRALAX/GLYCOLAX    510 g    Take 17 g by mouth daily as needed for constipation       PRISTIQ PO      Take 100 mg by mouth daily       senna-docusate 8.6-50 MG per tablet    SENOKOT-S;PERICOLACE     Take 2 tablets by mouth daily as needed for constipation       SUMATRIPTAN SUCCINATE PO      Take 50 mg by mouth once as needed for migraine       TOPIRAMATE PO      Take 50 mg by mouth in the morning and 100 mg by mouth in the evening       TYLENOL PO      Take 1,000 mg by mouth every 6 hours as needed       VITAMIN D3 PO      Take 5,000 Units by mouth daily

## 2017-02-28 NOTE — PATIENT INSTRUCTIONS
I have placed a dermatology referral for you.    Please schedule an appointment at your earliest convenience - phone number below or can go up to third floor.

## 2017-03-02 ENCOUNTER — APPOINTMENT (OUTPATIENT)
Dept: MRI IMAGING | Facility: CLINIC | Age: 27
End: 2017-03-02
Attending: EMERGENCY MEDICINE
Payer: MEDICAID

## 2017-03-02 ENCOUNTER — HOSPITAL ENCOUNTER (EMERGENCY)
Facility: CLINIC | Age: 27
Discharge: HOME | End: 2017-03-02
Attending: EMERGENCY MEDICINE | Admitting: EMERGENCY MEDICINE
Payer: MEDICAID

## 2017-03-02 ENCOUNTER — TELEPHONE (OUTPATIENT)
Dept: INTERNAL MEDICINE | Facility: CLINIC | Age: 27
End: 2017-03-02

## 2017-03-02 VITALS
TEMPERATURE: 98.1 F | SYSTOLIC BLOOD PRESSURE: 136 MMHG | OXYGEN SATURATION: 97 % | HEART RATE: 90 BPM | RESPIRATION RATE: 18 BRPM | DIASTOLIC BLOOD PRESSURE: 75 MMHG

## 2017-03-02 DIAGNOSIS — M54.2 CERVICALGIA: ICD-10-CM

## 2017-03-02 DIAGNOSIS — M50.30 DEGENERATION OF CERVICAL INTERVERTEBRAL DISC: ICD-10-CM

## 2017-03-02 DIAGNOSIS — R51.9 NONINTRACTABLE EPISODIC HEADACHE, UNSPECIFIED HEADACHE TYPE: ICD-10-CM

## 2017-03-02 LAB
ANION GAP SERPL CALCULATED.3IONS-SCNC: 10 MMOL/L (ref 3–14)
BASOPHILS # BLD AUTO: 0 10E9/L (ref 0–0.2)
BASOPHILS NFR BLD AUTO: 0.4 %
BUN SERPL-MCNC: 15 MG/DL (ref 7–30)
CALCIUM SERPL-MCNC: 8.9 MG/DL (ref 8.5–10.1)
CHLORIDE SERPL-SCNC: 110 MMOL/L (ref 94–109)
CO2 SERPL-SCNC: 23 MMOL/L (ref 20–32)
CREAT SERPL-MCNC: 0.52 MG/DL (ref 0.52–1.04)
DIFFERENTIAL METHOD BLD: NORMAL
EOSINOPHIL # BLD AUTO: 0.2 10E9/L (ref 0–0.7)
EOSINOPHIL NFR BLD AUTO: 2.1 %
ERYTHROCYTE [DISTWIDTH] IN BLOOD BY AUTOMATED COUNT: 13.8 % (ref 10–15)
GFR SERPL CREATININE-BSD FRML MDRD: ABNORMAL ML/MIN/1.7M2
GLUCOSE SERPL-MCNC: 103 MG/DL (ref 70–99)
HCG SERPL QL: NEGATIVE
HCT VFR BLD AUTO: 37.2 % (ref 35–47)
HGB BLD-MCNC: 11.9 G/DL (ref 11.7–15.7)
IMM GRANULOCYTES # BLD: 0 10E9/L (ref 0–0.4)
IMM GRANULOCYTES NFR BLD: 0.3 %
LYMPHOCYTES # BLD AUTO: 1.4 10E9/L (ref 0.8–5.3)
LYMPHOCYTES NFR BLD AUTO: 18.8 %
MCH RBC QN AUTO: 28.9 PG (ref 26.5–33)
MCHC RBC AUTO-ENTMCNC: 32 G/DL (ref 31.5–36.5)
MCV RBC AUTO: 90 FL (ref 78–100)
MONOCYTES # BLD AUTO: 0.5 10E9/L (ref 0–1.3)
MONOCYTES NFR BLD AUTO: 6.7 %
NEUTROPHILS # BLD AUTO: 5.5 10E9/L (ref 1.6–8.3)
NEUTROPHILS NFR BLD AUTO: 71.7 %
NRBC # BLD AUTO: 0 10*3/UL
NRBC BLD AUTO-RTO: 0 /100
PLATELET # BLD AUTO: 243 10E9/L (ref 150–450)
POTASSIUM SERPL-SCNC: 3.9 MMOL/L (ref 3.4–5.3)
RBC # BLD AUTO: 4.12 10E12/L (ref 3.8–5.2)
SODIUM SERPL-SCNC: 143 MMOL/L (ref 133–144)
WBC # BLD AUTO: 7.6 10E9/L (ref 4–11)

## 2017-03-02 PROCEDURE — 96365 THER/PROPH/DIAG IV INF INIT: CPT

## 2017-03-02 PROCEDURE — 72141 MRI NECK SPINE W/O DYE: CPT

## 2017-03-02 PROCEDURE — 99285 EMERGENCY DEPT VISIT HI MDM: CPT | Mod: 25

## 2017-03-02 PROCEDURE — 85025 COMPLETE CBC W/AUTO DIFF WBC: CPT | Performed by: EMERGENCY MEDICINE

## 2017-03-02 PROCEDURE — 36416 COLLJ CAPILLARY BLOOD SPEC: CPT | Performed by: EMERGENCY MEDICINE

## 2017-03-02 PROCEDURE — 96361 HYDRATE IV INFUSION ADD-ON: CPT

## 2017-03-02 PROCEDURE — 96375 TX/PRO/DX INJ NEW DRUG ADDON: CPT

## 2017-03-02 PROCEDURE — 25000128 H RX IP 250 OP 636: Performed by: EMERGENCY MEDICINE

## 2017-03-02 PROCEDURE — 25000125 ZZHC RX 250: Performed by: EMERGENCY MEDICINE

## 2017-03-02 PROCEDURE — 96367 TX/PROPH/DG ADDL SEQ IV INF: CPT

## 2017-03-02 PROCEDURE — 84703 CHORIONIC GONADOTROPIN ASSAY: CPT | Performed by: EMERGENCY MEDICINE

## 2017-03-02 PROCEDURE — 80048 BASIC METABOLIC PNL TOTAL CA: CPT | Performed by: EMERGENCY MEDICINE

## 2017-03-02 RX ORDER — SODIUM CHLORIDE 9 MG/ML
1000 INJECTION, SOLUTION INTRAVENOUS CONTINUOUS
Status: DISCONTINUED | OUTPATIENT
Start: 2017-03-02 | End: 2017-03-02 | Stop reason: HOSPADM

## 2017-03-02 RX ORDER — LIDOCAINE 40 MG/G
CREAM TOPICAL
Status: DISCONTINUED | OUTPATIENT
Start: 2017-03-02 | End: 2017-03-02 | Stop reason: HOSPADM

## 2017-03-02 RX ORDER — METHYLPREDNISOLONE 4 MG
TABLET, DOSE PACK ORAL
Qty: 21 TABLET | Refills: 0 | Status: SHIPPED | OUTPATIENT
Start: 2017-03-02 | End: 2017-04-01

## 2017-03-02 RX ORDER — OXYCODONE AND ACETAMINOPHEN 5; 325 MG/1; MG/1
1-2 TABLET ORAL EVERY 4 HOURS PRN
Qty: 15 TABLET | Refills: 0 | Status: SHIPPED | OUTPATIENT
Start: 2017-03-02 | End: 2017-04-01

## 2017-03-02 RX ORDER — DIAZEPAM 10 MG/2ML
5 INJECTION, SOLUTION INTRAMUSCULAR; INTRAVENOUS ONCE
Status: COMPLETED | OUTPATIENT
Start: 2017-03-02 | End: 2017-03-02

## 2017-03-02 RX ORDER — VALPROATE SODIUM 100 MG/ML
500 INJECTION, SOLUTION INTRAVENOUS ONCE
Status: COMPLETED | OUTPATIENT
Start: 2017-03-02 | End: 2017-03-02

## 2017-03-02 RX ORDER — ONDANSETRON 4 MG/1
4 TABLET, ORALLY DISINTEGRATING ORAL EVERY 8 HOURS PRN
Qty: 10 TABLET | Refills: 0 | Status: SHIPPED | OUTPATIENT
Start: 2017-03-02 | End: 2017-03-05

## 2017-03-02 RX ORDER — DEXAMETHASONE SODIUM PHOSPHATE 10 MG/ML
10 INJECTION, SOLUTION INTRAMUSCULAR; INTRAVENOUS ONCE
Status: COMPLETED | OUTPATIENT
Start: 2017-03-02 | End: 2017-03-02

## 2017-03-02 RX ORDER — DIAZEPAM 5 MG
5-10 TABLET ORAL EVERY 8 HOURS PRN
Qty: 20 TABLET | Refills: 0 | Status: SHIPPED | OUTPATIENT
Start: 2017-03-02 | End: 2017-04-01

## 2017-03-02 RX ORDER — DIPHENHYDRAMINE HYDROCHLORIDE 50 MG/ML
25 INJECTION INTRAMUSCULAR; INTRAVENOUS ONCE
Status: COMPLETED | OUTPATIENT
Start: 2017-03-02 | End: 2017-03-02

## 2017-03-02 RX ORDER — KETOROLAC TROMETHAMINE 30 MG/ML
30 INJECTION, SOLUTION INTRAMUSCULAR; INTRAVENOUS ONCE
Status: COMPLETED | OUTPATIENT
Start: 2017-03-02 | End: 2017-03-02

## 2017-03-02 RX ORDER — METOCLOPRAMIDE HYDROCHLORIDE 5 MG/ML
10 INJECTION INTRAMUSCULAR; INTRAVENOUS ONCE
Status: COMPLETED | OUTPATIENT
Start: 2017-03-02 | End: 2017-03-02

## 2017-03-02 RX ORDER — HYDROMORPHONE HYDROCHLORIDE 1 MG/ML
0.5 INJECTION, SOLUTION INTRAMUSCULAR; INTRAVENOUS; SUBCUTANEOUS
Status: DISCONTINUED | OUTPATIENT
Start: 2017-03-02 | End: 2017-03-02 | Stop reason: HOSPADM

## 2017-03-02 RX ADMIN — METOCLOPRAMIDE 10 MG: 5 INJECTION, SOLUTION INTRAMUSCULAR; INTRAVENOUS at 11:23

## 2017-03-02 RX ADMIN — HYDROMORPHONE HYDROCHLORIDE 0.5 MG: 10 INJECTION, SOLUTION INTRAMUSCULAR; INTRAVENOUS; SUBCUTANEOUS at 16:11

## 2017-03-02 RX ADMIN — DIPHENHYDRAMINE HYDROCHLORIDE 25 MG: 50 INJECTION, SOLUTION INTRAMUSCULAR; INTRAVENOUS at 11:13

## 2017-03-02 RX ADMIN — DIAZEPAM 5 MG: 5 INJECTION, SOLUTION INTRAMUSCULAR; INTRAVENOUS at 14:43

## 2017-03-02 RX ADMIN — VALPROATE SODIUM 500 MG: 100 INJECTION, SOLUTION INTRAVENOUS at 12:28

## 2017-03-02 RX ADMIN — SODIUM CHLORIDE 1000 ML: 9 INJECTION, SOLUTION INTRAVENOUS at 11:13

## 2017-03-02 RX ADMIN — KETOROLAC TROMETHAMINE 30 MG: 30 INJECTION, SOLUTION INTRAMUSCULAR at 11:13

## 2017-03-02 RX ADMIN — Medication 2 G: at 14:44

## 2017-03-02 RX ADMIN — DEXAMETHASONE SODIUM PHOSPHATE 10 MG: 10 INJECTION, SOLUTION INTRAMUSCULAR; INTRAVENOUS at 11:14

## 2017-03-02 ASSESSMENT — ENCOUNTER SYMPTOMS
NECK STIFFNESS: 1
NAUSEA: 1
HEADACHES: 1
FEVER: 0
SHORTNESS OF BREATH: 0
NECK PAIN: 1
ARTHRALGIAS: 1
VOMITING: 0

## 2017-03-02 NOTE — TELEPHONE ENCOUNTER
FYI-Patient calling and is having symptoms or excessive migraines that are worsening even with migraine medication and tylenol and Ibuprofen . Patient states the pain runs into her shoulders and neck .   Recommended patient go to ER if headaches worsening with a  . She is going to call around to see if someone can take her otherwise she is to dial 911 . RN will attempt to call patient back to check if she was able to find a ride.Linda Huang RN

## 2017-03-02 NOTE — ED NOTES
In Triage: ABC's intact. Alert and oriented x 3.  Pt reports headache for last few days that she cannot get to go away. Nausea, no vomiting. Hx migraines.

## 2017-03-02 NOTE — ED AVS SNAPSHOT
[facility] Emergency Department    [address] [place] [address]    Phone:  [phone]    Fax:  [phone]                                       Nevin Alvarado   MRN: [id number]    Department:  [facility] Emergency Department   Date of Visit:  3/2/2017           Patient Information     Date Of Birth          1990        Your diagnoses for this visit were:     Nonintractable episodic headache, unspecified headache type     Cervicalgia     Degeneration of cervical intervertebral disc        You were seen by Fausto Ramsey MD.      Follow-up Information     Follow up with Kelton Koenig MD In 3 days.    Specialty:  Neurosurgery    Why:  Return to the ER right away if increasing pain, fever, vomiting, blurry vision, numbness or tingling or any arms or legs or any concerns.    Contact information:    [facility]  [address]  [place] [address]  [phone]        Discharge References/Attachments     HEADACHE, UNSPECIFIED (ENGLISH)    CERVICAL SPINE PROBLEMS, NONSURGICAL TREATMENT OF (ENGLISH)      Future Appointments        Provider Department Dept Phone Center    3/21/2017 10:15 AM Carli Chiang PA-C [facility] [phone]       24 Hour Appointment Hotline       To make an appointment at any [facility], call [phone] ([phone]). If you don't have a family doctor or clinic, we will help you find one. [facility] are conveniently located to serve the needs of you and your family.             Review of your medicines      START taking        Dose / Directions Last dose taken    diazepam 5 MG tablet   Commonly known as:  VALIUM   Dose:  5-10 mg   Quantity:  20 tablet        Take 1-2 tablets (5-10 mg) by mouth every 8 hours as needed for anxiety or sleep   Refills:  0        methylPREDNISolone 4 MG tablet   Commonly known as:  MEDROL DOSEPAK   Quantity:  21  tablet        Follow package instructions   Refills:  0        ondansetron 4 MG ODT tab   Commonly known as:  ZOFRAN ODT   Dose:  4 mg   Quantity:  10 tablet        Take 1 tablet (4 mg) by mouth every 8 hours as needed for nausea   Refills:  0        oxyCODONE-acetaminophen 5-325 MG per tablet   Commonly known as:  PERCOCET   Dose:  1-2 tablet   Quantity:  15 tablet        Take 1-2 tablets by mouth every 4 hours as needed for pain   Refills:  0          Our records show that you are taking the medicines listed below. If these are incorrect, please call your family doctor or clinic.        Dose / Directions Last dose taken    cyclobenzaprine 5 MG tablet   Commonly known as:  FLEXERIL   Dose:  10 mg   Quantity:  42 tablet        Take 2 tablets (10 mg) by mouth 3 times daily as needed for muscle spasms   Refills:  0        ibuprofen 200 MG tablet   Commonly known as:  ADVIL/MOTRIN   Dose:  600 mg   Quantity:  100 tablet        Take 3 tablets (600 mg) by mouth every 6 hours as needed for other (Headache)   Refills:  0        lidocaine 5 % ointment   Commonly known as:  XYLOCAINE        Apply topically 3 times daily as needed for moderate pain   Refills:  0        ondansetron 4 MG tablet   Commonly known as:  ZOFRAN   Dose:  4 mg   Quantity:  8 tablet        Take 1 tablet (4 mg) by mouth every 6 hours   Refills:  0        polyethylene glycol powder   Commonly known as:  MIRALAX/GLYCOLAX   Dose:  17 g   Quantity:  510 g        Take 17 g by mouth daily as needed for constipation   Refills:  3        PRISTIQ PO   Dose:  100 mg        Take 100 mg by mouth daily   Refills:  0        senna-docusate 8.6-50 MG per tablet   Commonly known as:  SENOKOT-S;PERICOLACE   Dose:  2 tablet        Take 2 tablets by mouth daily as needed for constipation   Refills:  0        SUMATRIPTAN SUCCINATE PO   Dose:  50 mg        Take 50 mg by mouth once as needed for migraine   Refills:  0        TOPIRAMATE PO        Take 50 mg by mouth in the  morning and 100 mg by mouth in the evening   Refills:  0        TYLENOL PO   Dose:  1000 mg        Take 1,000 mg by mouth every 6 hours as needed   Refills:  0        VITAMIN D3 PO   Dose:  5000 Units        Take 5,000 Units by mouth daily   Refills:  0                Prescriptions were sent or printed at these locations (4 Prescriptions)                   Other Prescriptions                Printed at Department/Unit printer (4 of 4)         oxyCODONE-acetaminophen (PERCOCET) 5-325 MG per tablet               ondansetron (ZOFRAN ODT) 4 MG ODT tab               diazepam (VALIUM) 5 MG tablet               methylPREDNISolone (MEDROL DOSEPAK) 4 MG tablet                Procedures and tests performed during your visit     Basic metabolic panel    CBC with platelets differential    Cervical spine MRI w/o contrast    HCG qualitative    Peripheral IV catheter      Orders Needing Specimen Collection     None      Pending Results     No orders found from 2/28/2017 to 3/3/2017.            Pending Culture Results     No orders found from 2/28/2017 to 3/3/2017.             Test Results from your hospital stay     3/2/2017 12:18 PM - Interface, Flexilab Results      Component Results     Component Value Ref Range & Units Status    WBC 7.6 4.0 - 11.0 10e9/L Final    RBC Count 4.12 3.8 - 5.2 10e12/L Final    Hemoglobin 11.9 11.7 - 15.7 g/dL Final    Hematocrit 37.2 35.0 - 47.0 % Final    MCV 90 78 - 100 fl Final    MCH 28.9 26.5 - 33.0 pg Final    MCHC 32.0 31.5 - 36.5 g/dL Final    RDW 13.8 10.0 - 15.0 % Final    Platelet Count 243 150 - 450 10e9/L Final    Diff Method Automated Method  Final    % Neutrophils 71.7 % Final    % Lymphocytes 18.8 % Final    % Monocytes 6.7 % Final    % Eosinophils 2.1 % Final    % Basophils 0.4 % Final    % Immature Granulocytes 0.3 % Final    Nucleated RBCs 0 0 /100 Final    Absolute Neutrophil 5.5 1.6 - 8.3 10e9/L Final    Absolute Lymphocytes 1.4 0.8 - 5.3 10e9/L Final    Absolute Monocytes 0.5  0.0 - 1.3 10e9/L Final    Absolute Eosinophils 0.2 0.0 - 0.7 10e9/L Final    Absolute Basophils 0.0 0.0 - 0.2 10e9/L Final    Abs Immature Granulocytes 0.0 0 - 0.4 10e9/L Final    Absolute Nucleated RBC 0.0  Final         3/2/2017 12:36 PM - Interface, Flexilab Results      Component Results     Component Value Ref Range & Units Status    Sodium 143 133 - 144 mmol/L Final    Potassium 3.9 3.4 - 5.3 mmol/L Final    Chloride 110 (H) 94 - 109 mmol/L Final    Carbon Dioxide 23 20 - 32 mmol/L Final    Anion Gap 10 3 - 14 mmol/L Final    Glucose 103 (H) 70 - 99 mg/dL Final    Urea Nitrogen 15 7 - 30 mg/dL Final    Creatinine 0.52 0.52 - 1.04 mg/dL Final    GFR Estimate >90  Non  GFR Calc   >60 mL/min/1.7m2 Final    GFR Estimate If Black >90   GFR Calc   >60 mL/min/1.7m2 Final    Calcium 8.9 8.5 - 10.1 mg/dL Final               3/2/2017  2:15 PM - Interface, Radiant Ib      Narrative     MR CERVICAL SPINE WITHOUT CONTRAST  3/2/2017 1:26 PM     HISTORY: Neck pain and headache.    TECHNIQUE: Multiplanar multisequence images were obtained through the  cervical spine without contrast.    COMPARISON: 4/30/2014 CT.    FINDINGS: Sagittal images demonstrate mild gentle cervical kyphosis,  otherwise normal posterior alignment. There is no evidence for  craniovertebral or cervicomedullary junction abnormality. Minimal disc  space narrowing is present at C5-C6. Bone marrow signal intensity is  normal.    C2-C3: Normal.    C3-C4: Normal.    C4-C5: Normal.    C5-C6: There is a small right paramedian disc protrusion which is  minimally indenting the cervical cord causing mild to moderate central  canal stenosis but no neural foraminal stenosis. This disc protrusion  measures approximately 3 mm in base to apex height and 8 mm in width.    C6-C7: Normal.    C7-T1: Normal.    Paraspinal soft tissues: Unremarkable as visualized.        Impression     IMPRESSION: Small right paramedian C5-C6 disc protrusion  with  secondary mild to moderate central canal stenosis and mild cord  deformity.    FLETCHER PACE MD         3/2/2017  2:53 PM - Interface, Flexilab Results      Component Results     Component Value Ref Range & Units Status    HCG Qualitative Serum Negative NEG Final                Clinical Quality Measure: Blood Pressure Screening     Your blood pressure was checked while you were in the emergency department today. The last reading we obtained was  BP: 130/82 . Please read the guidelines below about what these numbers mean and what you should do about them.  If your systolic blood pressure (the top number) is less than 120 and your diastolic blood pressure (the bottom number) is less than 80, then your blood pressure is normal. There is nothing more that you need to do about it.  If your systolic blood pressure (the top number) is 120-139 or your diastolic blood pressure (the bottom number) is 80-89, your blood pressure may be higher than it should be. You should have your blood pressure rechecked within a year by a primary care provider.  If your systolic blood pressure (the top number) is 140 or greater or your diastolic blood pressure (the bottom number) is 90 or greater, you may have high blood pressure. High blood pressure is treatable, but if left untreated over time it can put you at risk for heart attack, stroke, or kidney failure. You should have your blood pressure rechecked by a primary care provider within the next 4 weeks.  If your provider in the emergency department today gave you specific instructions to follow-up with your doctor or provider even sooner than that, you should follow that instruction and not wait for up to 4 weeks for your follow-up visit.        Thank you for choosing [facility]       Thank you for choosing [facility] for your care. Our goal is always to provide you with excellent care. Hearing back from our patients is one way we can continue to improve our services. Please take a few  minutes to complete the written survey that you may receive in the mail after you visit with us. Thank you!        [employer] Information     [employer] gives you secure access to your electronic health record. If you see a primary care provider, you can also send messages to your care team and make appointments. If you have questions, please call your primary care clinic.  If you do not have a primary care provider, please call [phone] and they will assist you.        Care EveryWhere ID     This is your Care EveryWhere ID. This could be used by other organizations to access your [place] medical records  [id number]        After Visit Summary       This is your record. Keep this with you and show to your community pharmacist(s) and doctor(s) at your next visit.

## 2017-03-02 NOTE — ED AVS SNAPSHOT
[facility] Emergency Department    [address]    [facility] [address]    Phone:  [phone]    Fax:  [phone]                                       Nevin Alvarado   MRN: [id number]    Department:  [facility] Emergency Department   Date of Visit:  3/2/2017           After Visit Summary Signature Page     I have received my discharge instructions, and my questions have been answered. I have discussed any challenges I see with this plan with the nurse or doctor.    ..........................................................................................................................................  Patient/Patient Representative Signature      ..........................................................................................................................................  Patient Representative Print Name and Relationship to Patient    ..................................................               ................................................  Date                                            Time    ..........................................................................................................................................  Reviewed by Signature/Title    ...................................................              ..............................................  Date                                                            Time

## 2017-03-02 NOTE — ED PROVIDER NOTES
History     Chief Complaint:  Headache    HPI:    Nevin Alvarado is a 26 year old female with a history of migraines, anxiety, depression, borderline personality disorder, PTSD, amongst others who presents with a headache. The patient reports that the patient has been experiencing neck pain for the past several weeks. She was evaluated for this pain on 2/5/2017 where she had a normal CT angiogram ordered. For the past couple days, she has been experiencing a severe headache. Although she has had migraines in the past, she states this one is much worse and is not responding to her usual home remidies including Imitrex, Tylenol, and Ibuprofen. Additionally, she notes the pain radiates into her neck and shoulders bilaterally, and also complains of nausea and inability to sleep secondary to pain. She denies vomiting.    Separately, the patient has noticed increased spreading of a light red, spotted rash extending down from her knees for the past week. She states the red areas itch on occasion, but do not feel warm. Additionally, she denies using any new lotions or shaving recently that might have caused irritation. She has no shortness of breath or fever.     CTA neck 2/5/17    FINDINGS: Neck arteries are normal. No evidence for stenosis of  either carotid bifurcation. Antegrade flow and normal caliber  vertebral arteries. Origin of the great vessels off the aortic arch  appear intact. No evidence for dissection.         IMPRESSION: Normal CT angiogram of the neck. No evidence for  dissection.    Allergies:  Ancef [Cefazolin Sodium]  Augmentin  Blood-Group Specific Substance  Hydrocodone  Influenza Vaccines  Oseltamivir  Vicodin [Hydrocodone-Acetaminophen]  Cephalosporins      Medications:    Imitrex  Lidocaine  Senna-docusate  Flexeril  Miralax  Topiramate  Ibuprofen  Zofran  Pristiq  Tylenol  Vitamin D3     Past Medical History:    Depression  Borderline personality  disorder  Anxiety  Self-harm  PTSD  ADD  Morbid obesity  Anorexia   Bulimia  Migraines    Past Surgical History:    Mammoplasty reduction  Carpal tunnel release  Knee surgery  Lymph node removal, head and neck  Endometriosis  Fecal impaction removal  Laparotomy    Family History:    Cerebrovascular Disease- Father    Social History:  Smoking status: Never Smoker  Alcohol use: No   Marital Status:  Single   Lives at an independent group home.      Review of Systems   Constitutional: Negative for fever.   Respiratory: Negative for shortness of breath.    Gastrointestinal: Positive for nausea. Negative for vomiting.   Musculoskeletal: Positive for arthralgias, neck pain and neck stiffness.        Shoulders   Skin: Positive for rash.   Neurological: Positive for headaches.   All other systems reviewed and are negative.      Physical Exam     Patient Vitals for the past 24 hrs:   BP Temp Temp src Pulse Resp SpO2   03/02/17 1345 - - - - - 94 %   03/02/17 1330 134/85 - - - - -   03/02/17 1245 118/70 - - - - 99 %   03/02/17 1230 121/74 - - - - 98 %   03/02/17 1215 113/72 - - - - 98 %   03/02/17 1200 117/73 - - - - 99 %   03/02/17 1145 124/76 - - - - 98 %   03/02/17 1130 127/77 - - - - 99 %   03/02/17 1008 (!) 152/96 98.1  F (36.7  C) Temporal 90 18 98 %      Physical Exam:    Constitutional:  Appears well-developed and well-nourished. Alert. Conversant. Non toxic.  HENT:   Head: Atraumatic. No depressed skull fracture, Racoon Eyes, Snowden's sign, or hemotympanum. Face normal. TMs normal  Nose: Nose normal.  Mouth/Throat: Oral mucosa is clear and moist. no trismus. Pharynx normal. Tonsils symmetric. No tonsillar enlargement, erythema, or exudate.  Eyes: Conjunctivae normal. EOM normal. Pupils equal, round, and reactive to light. No scleral icterus.  is photophobic  Neck: Normal range of motion. Neck supple. No tracheal deviation present.   Cardiovascular: Normal rate, regular rhythm. No gallop. No friction rub. No murmur  heard. Symmetric radial artery pulses   Pulmonary/Chest: Effort normal. No stridor. No respiratory distress. No wheezes. No rales. No rhonchi . No tenderness.   Abdominal: Soft. Bowel sounds normal. No distension. No mass. No tenderness. No rebound. No guarding.   Musculoskeletal: Normal range of motion. No edema. No tenderness. No deformity   Lymph: No cervical adenopathy.   Neurological: Alert and oriented to person, place, and time. Cranial Nerves intact II-XII except I did not formally test gag or visual acuity.  EOMI. Strength 5/5 bilaterally in the trapezius, deltoid, biceps, triceps, , thumb opposition, finger abduction, psoas, quadriceps, hamstring, gastrocnemius, tibialis anterior. Sensation intact to light touch in all 4 extremities (C4-T1 and L4-S1). Finger to nose and coordination normal. Mental status normal. Attention normal. GCS 15. Memory normal. Speech fluent. Cognition normal. Gait normal.   Skin: Skin is warm and dry. No rash noted. No pallor. Normal capillary refill.  Psychiatric:  Normal mood.  Mildly anxious, but polite.  He says that the nurse line told her to come to the ER because she may have meningitis or need a spinal tap.  We discussed that at this point with no fever I suspect that she may have musculoskeletal pain in her neck or a cervicogenic headache clinical impression is extremely low likelihood of meningitis.  She didn't recall it but she was recently seen in the ER at the  for a similar neck pain and had a CTA that was negative.     Emergency Department Course     Imaging:  Radiographic findings were communicated with the patient who voiced understanding of the findings.    Cervical Spine MRI w/o Contrast:  IMPRESSION: Small right paramedian C5-C6 disc protrusion with  secondary mild to moderate central canal stenosis and mild cord  deformity.  As read by Radiology.    Laboratory:  CBC: WNL (WBC 7.6, HGB 11.9, )    Basal Metabolic Panel: Chloride 110 (H), Glucose 103  (H), o/w WNL (Creatinine 0.52)   HCG Qualitative: Negative    Interventions:  1113- Toradol 30 mg IV  1113- Benadryl 35 mg IV  1113- NS 1L IV Bolus   1114- Decadron 10 mg IV  1132- Reglan 10 mg IV  Recheck: headache not improved  1228- Depacon 500 mg IV  Recheck: headache not improved  1443- Valium 5 mg IV  1444- Magnesium sulfate 2 g IV  Recheck: headache not improved. MRI shows a C5 6 right paramedian disc bulge with mild deformity of the cord but no cord compression or edema.  Discussed with patient.  She is feeling somewhat better after the most recent meds but still wants more pain medicine.  Dilaudid ordered.  Discussed with the on-call spine surgeon, who is in the OR, Dr. Koenig, they agreed to follow-up on with patient in clinic.     Emergency Department Course:  Past medical records, nursing notes, and vitals reviewed.  1025: I performed an exam of the patient and obtained history, as documented above.    1048: I rechecked the patient. IV inserted.    The above interventions were administered.    1206: I rechecked the patient. She notes her pain has not improved with the first round of medication. I have ordered laboratory work, MRI, and additional pain medication.     Blood was drawn.     The patient was sent for a cervical spine MRI while in the emergency department, findings above.   1618: I rechecked the patient. Findings and plan explained to the Patient. Patient discharged home with instructions regarding supportive care, medications, and reasons to return. The importance of close follow-up was reviewed.      Impression & Plan      Medical Decision Making:  Nevin Alvarado is a 26 year old female who presents with a headache.  She's had neck pain for the past few weeks.  She had been seen at the emergency room on February 5 with neck pain.  At that time she had a negative CTA.  She's been doing physical therapy since then without improvement.  She developed a headache that seems to be stemming  from her posterior neck pain began gradually a few days ago and has gotten worse.  In some respects similar to her typical migraine but another's it's not in the same spot and not getting better with her Imitrex as her migraine usually does.  A broad differential diagnosis was considered including tension, migraine, analgesic rebound, occipital neuralgia, etc. I suspect this may be a cervicogenic headache.  MRI of her C-spine does show a C5 6 disc bulge which may be contributing to her neck pain.  I discussed this with spine surgeon, DR. Koenig.  Other less common but serious causes considered included meningitis, encephalitis, subarachnoid bleed, stroke, tumor, pseudotumor , etc. at this point I don't think lumbar puncture is indicated.  Patients questions were answered and they feel improved after above interventions in ED.  Supportive outpatient management is therefore indicated.  Headache precautions given for home.        Diagnosis:    ICD-10-CM    1. Nonintractable episodic headache, unspecified headache type R51    2. Cervicalgia M54.2    3. Degeneration of cervical intervertebral disc M50.30        Disposition:  discharged to home    Discharge Medications:  New Prescriptions    DIAZEPAM (VALIUM) 5 MG TABLET    Take 1-2 tablets (5-10 mg) by mouth every 8 hours as needed for anxiety or sleep    METHYLPREDNISOLONE (MEDROL DOSEPAK) 4 MG TABLET    Follow package instructions    ONDANSETRON (ZOFRAN ODT) 4 MG ODT TAB    Take 1 tablet (4 mg) by mouth every 8 hours as needed for nausea    OXYCODONE-ACETAMINOPHEN (PERCOCET) 5-325 MG PER TABLET    Take 1-2 tablets by mouth every 4 hours as needed for pain       Tiffany Gomez  3/2/2017   [facility] EMERGENCY DEPARTMENT    I, Tiffany Gomez, am serving as a scribe at 10:25 AM on 3/2/2017 to document services personally performed by Fausto Ramsey MD based on my observations and the provider's statements to me.       Fausto Ramsey,  MD  03/02/17 9481

## 2017-03-04 ENCOUNTER — HOSPITAL ENCOUNTER (EMERGENCY)
Facility: CLINIC | Age: 27
Discharge: HOME | End: 2017-03-04
Attending: FAMILY MEDICINE | Admitting: FAMILY MEDICINE
Payer: MEDICAID

## 2017-03-04 ENCOUNTER — APPOINTMENT (OUTPATIENT)
Dept: GENERAL RADIOLOGY | Facility: CLINIC | Age: 27
End: 2017-03-04
Attending: FAMILY MEDICINE
Payer: MEDICAID

## 2017-03-04 VITALS
DIASTOLIC BLOOD PRESSURE: 71 MMHG | RESPIRATION RATE: 14 BRPM | OXYGEN SATURATION: 97 % | SYSTOLIC BLOOD PRESSURE: 131 MMHG | TEMPERATURE: 98.7 F | HEART RATE: 93 BPM

## 2017-03-04 DIAGNOSIS — M79.602 PAIN OF LEFT UPPER EXTREMITY: ICD-10-CM

## 2017-03-04 PROCEDURE — 73090 X-RAY EXAM OF FOREARM: CPT | Mod: LT

## 2017-03-04 PROCEDURE — 99283 EMERGENCY DEPT VISIT LOW MDM: CPT | Mod: Z6 | Performed by: FAMILY MEDICINE

## 2017-03-04 PROCEDURE — 99283 EMERGENCY DEPT VISIT LOW MDM: CPT

## 2017-03-04 PROCEDURE — 25000132 ZZH RX MED GY IP 250 OP 250 PS 637: Performed by: FAMILY MEDICINE

## 2017-03-04 RX ORDER — IBUPROFEN 600 MG/1
600 TABLET, FILM COATED ORAL ONCE
Status: COMPLETED | OUTPATIENT
Start: 2017-03-04 | End: 2017-03-04

## 2017-03-04 RX ADMIN — IBUPROFEN 600 MG: 600 TABLET ORAL at 21:30

## 2017-03-04 NOTE — ED AVS SNAPSHOT
[facility], Emergency Department    [address]    [facility] [address]    Phone:  [phone]    Fax:  [phone]                                       Nevin Alvarado   MRN: [id number]    Department:  [facility], Emergency Department   Date of Visit:  3/4/2017           After Visit Summary Signature Page     I have received my discharge instructions, and my questions have been answered. I have discussed any challenges I see with this plan with the nurse or doctor.    ..........................................................................................................................................  Patient/Patient Representative Signature      ..........................................................................................................................................  Patient Representative Print Name and Relationship to Patient    ..................................................               ................................................  Date                                            Time    ..........................................................................................................................................  Reviewed by Signature/Title    ...................................................              ..............................................  Date                                                            Time

## 2017-03-04 NOTE — ED AVS SNAPSHOT
[facility], Emergency Department    [address]    [place] [address]    Phone:  [phone]    Fax:  [phone]                                       Nevin Alvarado   MRN: [id number]    Department:  [facility], Emergency Department   Date of Visit:  3/4/2017           Patient Information     Date Of Birth          1990        Your diagnoses for this visit were:     Pain of left upper extremity        You were seen by Ayo Martin MD.        Discharge Instructions       There are no broken bones.   Suggest that you use ice to area of pain for 10 minutes each hour for 2 days while you are awake\  For the discomfort, you may use either or both at same time - ibuprofen 400 to 600 mg(2 to 3 over the counter ibuprofen tablets) every 6 hours or two 325 mg tylenol every 6 hours  Rest and protect the arm  Elevate the arm above the heart level as much as possible    Future Appointments        Provider Department Dept Phone Center    3/21/2017 10:15 AM Carli Chiang PA-C [facility] [phone]       24 Hour Appointment Hotline       To make an appointment at any [facility] call [phone] ([phone]). If you don't have a family doctor or clinic, we will help you find one. [facility] are conveniently located to serve the needs of you and your family.             Review of your medicines      Our records show that you are taking the medicines listed below. If these are incorrect, please call your family doctor or clinic.        Dose / Directions Last dose taken    diazepam 5 MG tablet   Commonly known as:  VALIUM   Dose:  5-10 mg   Quantity:  20 tablet        Take 1-2 tablets (5-10 mg) by mouth every 8 hours as needed for anxiety or sleep   Refills:  0        ibuprofen 200 MG tablet   Commonly known as:  ADVIL/MOTRIN   Dose:  600 mg   Quantity:  100 tablet        Take 3 tablets (600 mg) by mouth every 6 hours as needed for other (Headache)    Refills:  0        methylPREDNISolone 4 MG tablet   Commonly known as:  MEDROL DOSEPAK   Quantity:  21 tablet        Follow package instructions   Refills:  0        ondansetron 4 MG ODT tab   Commonly known as:  ZOFRAN ODT   Dose:  4 mg   Quantity:  10 tablet        Take 1 tablet (4 mg) by mouth every 8 hours as needed for nausea   Refills:  0        ondansetron 4 MG tablet   Commonly known as:  ZOFRAN   Dose:  4 mg   Quantity:  8 tablet        Take 1 tablet (4 mg) by mouth every 6 hours   Refills:  0        oxyCODONE-acetaminophen 5-325 MG per tablet   Commonly known as:  PERCOCET   Dose:  1-2 tablet   Quantity:  15 tablet        Take 1-2 tablets by mouth every 4 hours as needed for pain   Refills:  0        polyethylene glycol powder   Commonly known as:  MIRALAX/GLYCOLAX   Dose:  17 g   Quantity:  510 g        Take 17 g by mouth daily as needed for constipation   Refills:  3        PRISTIQ PO   Dose:  100 mg        Take 100 mg by mouth daily   Refills:  0        senna-docusate 8.6-50 MG per tablet   Commonly known as:  SENOKOT-S;PERICOLACE   Dose:  2 tablet        Take 2 tablets by mouth daily as needed for constipation   Refills:  0        SUMATRIPTAN SUCCINATE PO   Dose:  50 mg        Take 50 mg by mouth once as needed for migraine   Refills:  0        TOPIRAMATE PO        Take 50 mg by mouth in the morning and 100 mg by mouth in the evening   Refills:  0        TYLENOL PO   Dose:  1000 mg        Take 1,000 mg by mouth every 6 hours as needed   Refills:  0        VITAMIN D3 PO   Dose:  5000 Units        Take 5,000 Units by mouth daily   Refills:  0                Procedures and tests performed during your visit     Kojo/Keven XR,  PA &LAT, left      Orders Needing Specimen Collection     None      Pending Results     No orders found from 3/2/2017 to 3/5/2017.            Pending Culture Results     No orders found from 3/2/2017 to 3/5/2017.            Thank you for choosing [facility]       Thank you for  choosing [facility] for your care. Our goal is always to provide you with excellent care. Hearing back from our patients is one way we can continue to improve our services. Please take a few minutes to complete the written survey that you may receive in the mail after you visit with us. Thank you!        [facility] Information     [facility] gives you secure access to your electronic health record. If you see a primary care provider, you can also send messages to your care team and make appointments. If you have questions, please call your primary care clinic.  If you do not have a primary care provider, please call [phone] and they will assist you.        Care EveryWhere ID     This is your Care EveryWhere ID. This could be used by other organizations to access your [facility] medical records  [id number]        After Visit Summary       This is your record. Keep this with you and show to your community pharmacist(s) and doctor(s) at your next visit.

## 2017-03-05 NOTE — DISCHARGE INSTRUCTIONS
There are no broken bones.   Suggest that you use ice to area of pain for 10 minutes each hour for 2 days while you are awake\  For the discomfort, you may use either or both at same time - ibuprofen 400 to 600 mg(2 to 3 over the counter ibuprofen tablets) every 6 hours or two 325 mg tylenol every 6 hours  Rest and protect the arm  Elevate the arm above the heart level as much as possible

## 2017-03-07 ENCOUNTER — APPOINTMENT (OUTPATIENT)
Dept: CT IMAGING | Facility: CLINIC | Age: 27
End: 2017-03-07
Attending: EMERGENCY MEDICINE
Payer: MEDICAID

## 2017-03-07 ENCOUNTER — HOSPITAL ENCOUNTER (EMERGENCY)
Facility: CLINIC | Age: 27
Discharge: HOME | End: 2017-03-07
Attending: EMERGENCY MEDICINE | Admitting: EMERGENCY MEDICINE
Payer: MEDICAID

## 2017-03-07 ENCOUNTER — APPOINTMENT (OUTPATIENT)
Dept: ULTRASOUND IMAGING | Facility: CLINIC | Age: 27
End: 2017-03-07
Attending: EMERGENCY MEDICINE
Payer: MEDICAID

## 2017-03-07 VITALS
RESPIRATION RATE: 22 BRPM | TEMPERATURE: 97.9 F | WEIGHT: 228 LBS | HEIGHT: 62 IN | HEART RATE: 85 BPM | DIASTOLIC BLOOD PRESSURE: 88 MMHG | SYSTOLIC BLOOD PRESSURE: 132 MMHG | OXYGEN SATURATION: 95 % | BODY MASS INDEX: 41.96 KG/M2

## 2017-03-07 DIAGNOSIS — N83.202 LEFT OVARIAN CYST: ICD-10-CM

## 2017-03-07 DIAGNOSIS — R10.31 RLQ ABDOMINAL PAIN: ICD-10-CM

## 2017-03-07 LAB
ALBUMIN SERPL-MCNC: 3.7 G/DL (ref 3.4–5)
ALBUMIN UR-MCNC: NEGATIVE MG/DL
ALP SERPL-CCNC: 81 U/L (ref 40–150)
ALT SERPL W P-5'-P-CCNC: 20 U/L (ref 0–50)
ANION GAP SERPL CALCULATED.3IONS-SCNC: 11 MMOL/L (ref 3–14)
APPEARANCE UR: CLEAR
AST SERPL W P-5'-P-CCNC: 15 U/L (ref 0–45)
BASOPHILS # BLD AUTO: 0 10E9/L (ref 0–0.2)
BASOPHILS NFR BLD AUTO: 0.3 %
BILIRUB SERPL-MCNC: 0.2 MG/DL (ref 0.2–1.3)
BILIRUB UR QL STRIP: NEGATIVE
BUN SERPL-MCNC: 11 MG/DL (ref 7–30)
CALCIUM SERPL-MCNC: 8.6 MG/DL (ref 8.5–10.1)
CHLORIDE SERPL-SCNC: 109 MMOL/L (ref 94–109)
CO2 SERPL-SCNC: 22 MMOL/L (ref 20–32)
COLOR UR AUTO: NORMAL
CREAT SERPL-MCNC: 0.44 MG/DL (ref 0.52–1.04)
DIFFERENTIAL METHOD BLD: ABNORMAL
EOSINOPHIL # BLD AUTO: 0.1 10E9/L (ref 0–0.7)
EOSINOPHIL NFR BLD AUTO: 0.8 %
ERYTHROCYTE [DISTWIDTH] IN BLOOD BY AUTOMATED COUNT: 14.3 % (ref 10–15)
GFR SERPL CREATININE-BSD FRML MDRD: ABNORMAL ML/MIN/1.7M2
GLUCOSE SERPL-MCNC: 104 MG/DL (ref 70–99)
GLUCOSE UR STRIP-MCNC: NEGATIVE MG/DL
HCG SERPL QL: NEGATIVE
HCT VFR BLD AUTO: 36.5 % (ref 35–47)
HGB BLD-MCNC: 11.9 G/DL (ref 11.7–15.7)
HGB UR QL STRIP: NEGATIVE
IMM GRANULOCYTES # BLD: 0.1 10E9/L (ref 0–0.4)
IMM GRANULOCYTES NFR BLD: 0.6 %
KETONES UR STRIP-MCNC: NEGATIVE MG/DL
LEUKOCYTE ESTERASE UR QL STRIP: NEGATIVE
LIPASE SERPL-CCNC: 157 U/L (ref 73–393)
LYMPHOCYTES # BLD AUTO: 2.4 10E9/L (ref 0.8–5.3)
LYMPHOCYTES NFR BLD AUTO: 16.4 %
MCH RBC QN AUTO: 29.3 PG (ref 26.5–33)
MCHC RBC AUTO-ENTMCNC: 32.6 G/DL (ref 31.5–36.5)
MCV RBC AUTO: 90 FL (ref 78–100)
MONOCYTES # BLD AUTO: 0.9 10E9/L (ref 0–1.3)
MONOCYTES NFR BLD AUTO: 6.4 %
NEUTROPHILS # BLD AUTO: 10.8 10E9/L (ref 1.6–8.3)
NEUTROPHILS NFR BLD AUTO: 75.5 %
NITRATE UR QL: NEGATIVE
NRBC # BLD AUTO: 0 10*3/UL
NRBC BLD AUTO-RTO: 0 /100
PH UR STRIP: 6 PH (ref 5–7)
PLATELET # BLD AUTO: 268 10E9/L (ref 150–450)
POTASSIUM SERPL-SCNC: 3.7 MMOL/L (ref 3.4–5.3)
PROT SERPL-MCNC: 7.3 G/DL (ref 6.8–8.8)
RBC # BLD AUTO: 4.06 10E12/L (ref 3.8–5.2)
RBC #/AREA URNS AUTO: 0 /HPF (ref 0–2)
SODIUM SERPL-SCNC: 142 MMOL/L (ref 133–144)
SP GR UR STRIP: 1 (ref 1–1.03)
SQUAMOUS #/AREA URNS AUTO: <1 /HPF (ref 0–1)
URN SPEC COLLECT METH UR: NORMAL
UROBILINOGEN UR STRIP-MCNC: 0 MG/DL (ref 0–2)
WBC # BLD AUTO: 14.3 10E9/L (ref 4–11)
WBC #/AREA URNS AUTO: <1 /HPF (ref 0–2)

## 2017-03-07 PROCEDURE — 85025 COMPLETE CBC W/AUTO DIFF WBC: CPT | Performed by: EMERGENCY MEDICINE

## 2017-03-07 PROCEDURE — 25000128 H RX IP 250 OP 636: Performed by: EMERGENCY MEDICINE

## 2017-03-07 PROCEDURE — 83690 ASSAY OF LIPASE: CPT | Performed by: EMERGENCY MEDICINE

## 2017-03-07 PROCEDURE — 96374 THER/PROPH/DIAG INJ IV PUSH: CPT

## 2017-03-07 PROCEDURE — 93976 VASCULAR STUDY: CPT | Mod: XS

## 2017-03-07 PROCEDURE — 96375 TX/PRO/DX INJ NEW DRUG ADDON: CPT

## 2017-03-07 PROCEDURE — 84703 CHORIONIC GONADOTROPIN ASSAY: CPT | Performed by: EMERGENCY MEDICINE

## 2017-03-07 PROCEDURE — 74177 CT ABD & PELVIS W/CONTRAST: CPT

## 2017-03-07 PROCEDURE — 25500064 ZZH RX 255 OP 636: Performed by: EMERGENCY MEDICINE

## 2017-03-07 PROCEDURE — 96361 HYDRATE IV INFUSION ADD-ON: CPT

## 2017-03-07 PROCEDURE — 80053 COMPREHEN METABOLIC PANEL: CPT | Performed by: EMERGENCY MEDICINE

## 2017-03-07 PROCEDURE — 99284 EMERGENCY DEPT VISIT MOD MDM: CPT | Mod: 25

## 2017-03-07 PROCEDURE — 81001 URINALYSIS AUTO W/SCOPE: CPT | Performed by: EMERGENCY MEDICINE

## 2017-03-07 RX ORDER — ONDANSETRON 2 MG/ML
4 INJECTION INTRAMUSCULAR; INTRAVENOUS
Status: COMPLETED | OUTPATIENT
Start: 2017-03-07 | End: 2017-03-07

## 2017-03-07 RX ORDER — LIDOCAINE 40 MG/G
CREAM TOPICAL
Status: DISCONTINUED | OUTPATIENT
Start: 2017-03-07 | End: 2017-03-07 | Stop reason: HOSPADM

## 2017-03-07 RX ORDER — MORPHINE SULFATE 4 MG/ML
4 INJECTION, SOLUTION INTRAMUSCULAR; INTRAVENOUS
Status: COMPLETED | OUTPATIENT
Start: 2017-03-07 | End: 2017-03-07

## 2017-03-07 RX ORDER — IOPAMIDOL 755 MG/ML
500 INJECTION, SOLUTION INTRAVASCULAR ONCE
Status: COMPLETED | OUTPATIENT
Start: 2017-03-07 | End: 2017-03-07

## 2017-03-07 RX ORDER — KETOROLAC TROMETHAMINE 30 MG/ML
30 INJECTION, SOLUTION INTRAMUSCULAR; INTRAVENOUS ONCE
Status: COMPLETED | OUTPATIENT
Start: 2017-03-07 | End: 2017-03-07

## 2017-03-07 RX ADMIN — ONDANSETRON 4 MG: 2 INJECTION INTRAMUSCULAR; INTRAVENOUS at 02:51

## 2017-03-07 RX ADMIN — SODIUM CHLORIDE 65 ML: 9 INJECTION, SOLUTION INTRAVENOUS at 03:32

## 2017-03-07 RX ADMIN — KETOROLAC TROMETHAMINE 30 MG: 30 INJECTION, SOLUTION INTRAMUSCULAR at 02:51

## 2017-03-07 RX ADMIN — IOPAMIDOL 100 ML: 755 INJECTION, SOLUTION INTRAVENOUS at 03:32

## 2017-03-07 RX ADMIN — SODIUM CHLORIDE 1000 ML: 9 INJECTION, SOLUTION INTRAVENOUS at 02:51

## 2017-03-07 RX ADMIN — MORPHINE SULFATE 4 MG: 4 INJECTION, SOLUTION INTRAMUSCULAR; INTRAVENOUS at 02:51

## 2017-03-07 ASSESSMENT — ENCOUNTER SYMPTOMS
NAUSEA: 1
CHILLS: 0
VOMITING: 0
SHORTNESS OF BREATH: 0
FEVER: 0
ABDOMINAL PAIN: 1
BLOOD IN STOOL: 0
DIARRHEA: 0

## 2017-03-07 NOTE — ED AVS SNAPSHOT
[facility] Emergency Department    [address]    [facility] [address]    Phone:  [phone]    Fax:  [phone]                                       Nevin Alvarado   MRN: [id number]    Department:  [facility] Emergency Department   Date of Visit:  3/7/2017           After Visit Summary Signature Page     I have received my discharge instructions, and my questions have been answered. I have discussed any challenges I see with this plan with the nurse or doctor.    ..........................................................................................................................................  Patient/Patient Representative Signature      ..........................................................................................................................................  Patient Representative Print Name and Relationship to Patient    ..................................................               ................................................  Date                                            Time    ..........................................................................................................................................  Reviewed by Signature/Title    ...................................................              ..............................................  Date                                                            Time

## 2017-03-07 NOTE — ED NOTES
Discharge instructions gone over with pt, verbalized understanding. Discharged home with plan for follow up and no new prescriptions. Denies questions at time of discharge.

## 2017-03-07 NOTE — ED AVS SNAPSHOT
[facility] Emergency Department    [address] [place] [address]    Phone:  [phone]    Fax:  [phone]                                       Nevin Alvarado   MRN: [id number]    Department:  [facility] Emergency Department   Date of Visit:  3/7/2017           Patient Information     Date Of Birth          1990        Your diagnoses for this visit were:     RLQ abdominal pain     Left ovarian cyst        You were seen by Siddharth Soler MD.      Follow-up Information     Follow up with Sandra Ramos MD.    Specialty:  Internal Medicine    Why:  As needed    Contact information:    [facility]  [address]  [facility] [address]  [phone]          Discharge Instructions       Discharge Instructions  Abdominal Pain    Abdominal pain can be caused by many things. Your evaluation today does not show the exact cause for your pain. Your doctor today has decided that it is unlikely your pain is due to a life threatening problem, or a problem requiring surgery or hospital admission. Sometimes those problems cannot be found right away, so it is very important that you follow up as directed.  Sometimes only the changes which occur over time allow the cause of your pain to be found.    Return to the Emergency Department for a recheck in 8-12 hours if your pain continues.  If your pain gets worse, changes in location, or feels different, return to the Emergency Department right away.    ADULTS:  Return to the Emergency Department right away if:      You get an oral temperature above 102oF or as directed by your doctor.    You have blood in your stools (bright red or black, tarry stools).    You keep throwing up or can t drink liquids.    You see blood when you throw up.    You can t have a bowel movement or you can t pass gas.    Your stomach gets bloated or bigger.    Your skin or the whites of your eyes look yellow.    You  "faint.    You have bloody, frequent or painful urination.    You have new symptoms or anything that worries you.      MORE INFORMATION:    Appendicitis:  A possible cause of abdominal pain in any person who still has their appendix is acute appendicitis. Appendicitis is often hard to diagnose.  Testing does not always rule out early appendicitis or other causes of abdominal pain. Close follow-up with your doctor and re-evaluations may be needed to figure out the reason for your abdominal pain.    Follow-up:  It is very important that you make an appointment with your clinic and go to the appointment.  If you do not follow-up with your primary doctor, it may result in missing an important development which could result in permanent injury or disability and/or lasting pain.  If there is any problem keeping your appointment, call your doctor or return to the Emergency Department.    Medications:  Take your medications as directed by your doctor today.  Before using over-the-counter medications, ask your doctor and make sure to take the medications as directed.  If you have any questions about medications, ask your doctor.    Diet:  Resume your normal diet as much as possible, but do not eat fried, fatty or spicy foods while you have pain.  Do not drink alcohol or have caffeine.  Do not smoke tobacco.    Probiotics: If you have been given an antibiotic, you may want to also take a probiotic pill or eat yogurt with live cultures. Probiotics have \"good bacteria\" to help your intestines stay healthy. Studies have shown that probiotics help prevent diarrhea and other intestine problems (including C. diff infection) when you take antibiotics. You can buy these without a prescription in the pharmacy section of the store.     If you were given a prescription for medicine here today, be sure to read all of the information (including the package insert) that comes with your prescription.  This will include important information " about the medicine, its side effects, and any warnings that you need to know about.  The pharmacist who fills the prescription can provide more information and answer questions you may have about the medicine.  If you have questions or concerns that the pharmacist cannot address, please call or return to the Emergency Department.       Remember that you can always come back to the Emergency Department if you are not able to see your regular doctor in the amount of time listed above, if you get any new symptoms, or if there is anything that worries you.          Future Appointments        Provider Department Dept Phone Center    3/21/2017 10:15 AM Carli Chiang PA-C [facility] [phone]       24 Hour Appointment Hotline       To make an appointment at any [facility], call [phone] ([phone]). If you don't have a family doctor or clinic, we will help you find one. [facility] are conveniently located to serve the needs of you and your family.             Review of your medicines      Our records show that you are taking the medicines listed below. If these are incorrect, please call your family doctor or clinic.        Dose / Directions Last dose taken    diazepam 5 MG tablet   Commonly known as:  VALIUM   Dose:  5-10 mg   Quantity:  20 tablet        Take 1-2 tablets (5-10 mg) by mouth every 8 hours as needed for anxiety or sleep   Refills:  0        methylPREDNISolone 4 MG tablet   Commonly known as:  MEDROL DOSEPAK   Quantity:  21 tablet        Follow package instructions   Refills:  0        ondansetron 4 MG tablet   Commonly known as:  ZOFRAN   Dose:  4 mg   Quantity:  8 tablet        Take 1 tablet (4 mg) by mouth every 6 hours   Refills:  0        oxyCODONE-acetaminophen 5-325 MG per tablet   Commonly known as:  PERCOCET   Dose:  1-2 tablet   Quantity:  15 tablet        Take 1-2 tablets by mouth every 4 hours as needed for pain   Refills:  0         polyethylene glycol powder   Commonly known as:  MIRALAX/GLYCOLAX   Dose:  17 g   Quantity:  510 g        Take 17 g by mouth daily as needed for constipation   Refills:  3        PRISTIQ PO   Dose:  100 mg        Take 100 mg by mouth daily   Refills:  0        senna-docusate 8.6-50 MG per tablet   Commonly known as:  SENOKOT-S;PERICOLACE   Dose:  2 tablet        Take 2 tablets by mouth daily as needed for constipation   Refills:  0        SUMATRIPTAN SUCCINATE PO   Dose:  50 mg        Take 50 mg by mouth once as needed for migraine   Refills:  0        TOPIRAMATE PO        Take 50 mg by mouth in the morning and 100 mg by mouth in the evening   Refills:  0        VITAMIN D3 PO   Dose:  5000 Units        Take 5,000 Units by mouth daily   Refills:  0                Procedures and tests performed during your visit     CBC with platelets differential    CT Abdomen Pelvis w Contrast    Comprehensive metabolic panel    HCG QUALitative    Lipase    UA with Microscopic    US Pelvic Complete w Transvaginal & Abd/Pel Duplex Limited      Orders Needing Specimen Collection     None      Pending Results     Date and Time Order Name Status Description    3/7/2017 0224 US Pelvic Complete w Transvaginal & Abd/Pel Duplex Limited Preliminary     3/7/2017 0224 CT Abdomen Pelvis w Contrast Preliminary             Pending Culture Results     No orders found from 3/5/2017 to 3/8/2017.             Test Results from your hospital stay     3/7/2017  3:05 AM - Interface, Plum (Formerly Ube) Results      Component Results     Component Value Ref Range & Units Status    WBC 14.3 (H) 4.0 - 11.0 10e9/L Final    RBC Count 4.06 3.8 - 5.2 10e12/L Final    Hemoglobin 11.9 11.7 - 15.7 g/dL Final    Hematocrit 36.5 35.0 - 47.0 % Final    MCV 90 78 - 100 fl Final    MCH 29.3 26.5 - 33.0 pg Final    MCHC 32.6 31.5 - 36.5 g/dL Final    RDW 14.3 10.0 - 15.0 % Final    Platelet Count 268 150 - 450 10e9/L Final    Diff Method Automated Method  Final    % Neutrophils  75.5 % Final    % Lymphocytes 16.4 % Final    % Monocytes 6.4 % Final    % Eosinophils 0.8 % Final    % Basophils 0.3 % Final    % Immature Granulocytes 0.6 % Final    Nucleated RBCs 0 0 /100 Final    Absolute Neutrophil 10.8 (H) 1.6 - 8.3 10e9/L Final    Absolute Lymphocytes 2.4 0.8 - 5.3 10e9/L Final    Absolute Monocytes 0.9 0.0 - 1.3 10e9/L Final    Absolute Eosinophils 0.1 0.0 - 0.7 10e9/L Final    Absolute Basophils 0.0 0.0 - 0.2 10e9/L Final    Abs Immature Granulocytes 0.1 0 - 0.4 10e9/L Final    Absolute Nucleated RBC 0.0  Final         3/7/2017  3:22 AM - Interface, Flexilab Results      Component Results     Component Value Ref Range & Units Status    Sodium 142 133 - 144 mmol/L Final    Potassium 3.7 3.4 - 5.3 mmol/L Final    Chloride 109 94 - 109 mmol/L Final    Carbon Dioxide 22 20 - 32 mmol/L Final    Anion Gap 11 3 - 14 mmol/L Final    Glucose 104 (H) 70 - 99 mg/dL Final    Urea Nitrogen 11 7 - 30 mg/dL Final    Creatinine 0.44 (L) 0.52 - 1.04 mg/dL Final    GFR Estimate >90  Non  GFR Calc   >60 mL/min/1.7m2 Final    GFR Estimate If Black >90   GFR Calc   >60 mL/min/1.7m2 Final    Calcium 8.6 8.5 - 10.1 mg/dL Final    Bilirubin Total 0.2 0.2 - 1.3 mg/dL Final    Albumin 3.7 3.4 - 5.0 g/dL Final    Protein Total 7.3 6.8 - 8.8 g/dL Final    Alkaline Phosphatase 81 40 - 150 U/L Final    ALT 20 0 - 50 U/L Final    AST 15 0 - 45 U/L Final         3/7/2017  3:22 AM - Interface, Flexilab Results      Component Results     Component Value Ref Range & Units Status    Lipase 157 73 - 393 U/L Final         3/7/2017  3:25 AM - Interface, Flexilab Results      Component Results     Component Value Ref Range & Units Status    HCG Qualitative Serum Negative NEG Final         3/7/2017  4:30 AM - Interface, Flexilab Results      Component Results     Component Value Ref Range & Units Status    Color Urine Straw  Final    Appearance Urine Clear  Final    Glucose Urine Negative NEG mg/dL  Final    Bilirubin Urine Negative NEG Final    Ketones Urine Negative NEG mg/dL Final    Specific Gravity Urine 1.005 1.003 - 1.035 Final    Blood Urine Negative NEG Final    pH Urine 6.0 5.0 - 7.0 pH Final    Protein Albumin Urine Negative NEG mg/dL Final    Urobilinogen mg/dL 0.0 0.0 - 2.0 mg/dL Final    Nitrite Urine Negative NEG Final    Leukocyte Esterase Urine Negative NEG Final    Source Midstream Urine  Final    WBC Urine <1 0 - 2 /HPF Final    RBC Urine 0 0 - 2 /HPF Final    Squamous Epithelial /HPF Urine <1 0 - 1 /HPF Final         3/7/2017  3:56 AM - Interface, Radiant Ib      Narrative     CT ABDOMEN PELVIS W CONTRAST  3/7/2017 3:39 AM      HISTORY: Right lower quadrant pain.    TECHNIQUE: CT abdomen and pelvis with intravenous contrast. Radiation  dose for this scan was reduced using automated exposure control,  adjustment of the mA and/or kV according to patient size, or iterative  reconstruction technique. 100 mL Isovue-370.     COMPARISON: 1/21/2017.    FINDINGS:    Abdomen: The lung bases are unremarkable. The heart size is normal.  The liver, spleen, gallbladder, pancreas, adrenal glands and kidneys  are normal in appearance. There is no abdominal or pelvic lymph node  enlargement.    Pelvis: There is an IUD in place. Left ovarian cyst measures 5.0 x 3.6  cm. Right ovary is normal in appearance. There are a few pelvic  phleboliths. No bowel obstruction or inflammation. The appendix is  normal. No free intraperitoneal gas or fluid. Postoperative changes in  the anterior abdominal wall. No abscess formation.        Impression     IMPRESSION:  1. There is a 5 cm left ovarian cyst.  2. No other acute abnormality. No bowel obstruction or inflammation.  No appendicitis.  3. IUD in place.         3/7/2017  4:37 AM - Interface, Radiant Ib      Narrative     US PELVIS COMPLETE W TRANSVAGINAL AND DOPPLER LIMITED  3/7/2017 4:16  AM      HISTORY: Pelvic pain - right lower quadrant.     COMPARISON: CT  3/7/2017.    FINDINGS: Transabdominal and transvaginal imaging was performed.  Transvaginal imaging was performed to better visualize the endometrium  and adnexa. The uterus is normal in size and position measuring 9.9 x  2.8 x 3.8 cm. The IUD seen on CT is not definitely identified. There  is a small amount of fluid in the endocervical canal. The right ovary  is normal in size and appearance. There is a simple cyst in the left  ovary measuring 4.3 x 3.3 x 3.3 cm. Color Doppler and Doppler waveform  analysis of the ovaries shows blood flow bilaterally. There is no free  pelvic fluid.        Impression     IMPRESSION: 4.3 cm left ovarian cyst.                Clinical Quality Measure: Blood Pressure Screening     Your blood pressure was checked while you were in the emergency department today. The last reading we obtained was  BP: 142/77 . Please read the guidelines below about what these numbers mean and what you should do about them.  If your systolic blood pressure (the top number) is less than 120 and your diastolic blood pressure (the bottom number) is less than 80, then your blood pressure is normal. There is nothing more that you need to do about it.  If your systolic blood pressure (the top number) is 120-139 or your diastolic blood pressure (the bottom number) is 80-89, your blood pressure may be higher than it should be. You should have your blood pressure rechecked within a year by a primary care provider.  If your systolic blood pressure (the top number) is 140 or greater or your diastolic blood pressure (the bottom number) is 90 or greater, you may have high blood pressure. High blood pressure is treatable, but if left untreated over time it can put you at risk for heart attack, stroke, or kidney failure. You should have your blood pressure rechecked by a primary care provider within the next 4 weeks.  If your provider in the emergency department today gave you specific instructions to follow-up with your  doctor or provider even sooner than that, you should follow that instruction and not wait for up to 4 weeks for your follow-up visit.        Thank you for choosing [facility]       Thank you for choosing [facility] for your care. Our goal is always to provide you with excellent care. Hearing back from our patients is one way we can continue to improve our services. Please take a few minutes to complete the written survey that you may receive in the mail after you visit with us. Thank you!        [employer] Information     [facility] gives you secure access to your electronic health record. If you see a primary care provider, you can also send messages to your care team and make appointments. If you have questions, please call your primary care clinic.  If you do not have a primary care provider, please call [phone] and they will assist you.        Care EveryWhere ID     This is your Care EveryWhere ID. This could be used by other organizations to access your [facility] medical records  [id number]        After Visit Summary       This is your record. Keep this with you and show to your community pharmacist(s) and doctor(s) at your next visit.

## 2017-03-07 NOTE — ED NOTES
Pt reports RLQ abd pain that woke her from sleep 1-2 hours ago. Pt is nauseated but has not vomited.

## 2017-03-07 NOTE — ED NOTES
Pt continues to put the call light on and states she has pain. MD again aware and no further medications orders obtained. Awaiting further results.

## 2017-03-07 NOTE — DISCHARGE INSTRUCTIONS
Discharge Instructions  Abdominal Pain    Abdominal pain can be caused by many things. Your evaluation today does not show the exact cause for your pain. Your doctor today has decided that it is unlikely your pain is due to a life threatening problem, or a problem requiring surgery or hospital admission. Sometimes those problems cannot be found right away, so it is very important that you follow up as directed.  Sometimes only the changes which occur over time allow the cause of your pain to be found.    Return to the Emergency Department for a recheck in 8-12 hours if your pain continues.  If your pain gets worse, changes in location, or feels different, return to the Emergency Department right away.    ADULTS:  Return to the Emergency Department right away if:      You get an oral temperature above 102oF or as directed by your doctor.    You have blood in your stools (bright red or black, tarry stools).    You keep throwing up or can t drink liquids.    You see blood when you throw up.    You can t have a bowel movement or you can t pass gas.    Your stomach gets bloated or bigger.    Your skin or the whites of your eyes look yellow.    You faint.    You have bloody, frequent or painful urination.    You have new symptoms or anything that worries you.      MORE INFORMATION:    Appendicitis:  A possible cause of abdominal pain in any person who still has their appendix is acute appendicitis. Appendicitis is often hard to diagnose.  Testing does not always rule out early appendicitis or other causes of abdominal pain. Close follow-up with your doctor and re-evaluations may be needed to figure out the reason for your abdominal pain.    Follow-up:  It is very important that you make an appointment with your clinic and go to the appointment.  If you do not follow-up with your primary doctor, it may result in missing an important development which could result in permanent injury or disability and/or lasting pain.  If  "there is any problem keeping your appointment, call your doctor or return to the Emergency Department.    Medications:  Take your medications as directed by your doctor today.  Before using over-the-counter medications, ask your doctor and make sure to take the medications as directed.  If you have any questions about medications, ask your doctor.    Diet:  Resume your normal diet as much as possible, but do not eat fried, fatty or spicy foods while you have pain.  Do not drink alcohol or have caffeine.  Do not smoke tobacco.    Probiotics: If you have been given an antibiotic, you may want to also take a probiotic pill or eat yogurt with live cultures. Probiotics have \"good bacteria\" to help your intestines stay healthy. Studies have shown that probiotics help prevent diarrhea and other intestine problems (including C. diff infection) when you take antibiotics. You can buy these without a prescription in the pharmacy section of the store.     If you were given a prescription for medicine here today, be sure to read all of the information (including the package insert) that comes with your prescription.  This will include important information about the medicine, its side effects, and any warnings that you need to know about.  The pharmacist who fills the prescription can provide more information and answer questions you may have about the medicine.  If you have questions or concerns that the pharmacist cannot address, please call or return to the Emergency Department.       Remember that you can always come back to the Emergency Department if you are not able to see your regular doctor in the amount of time listed above, if you get any new symptoms, or if there is anything that worries you.        "

## 2017-03-07 NOTE — ED PROVIDER NOTES
History     Chief Complaint:  Abdominal Pain    HPI   Nevin Alvarado is a 26 year old female with a history of PTSD, depression, personality disorder, and multiple rectal foreign bodies requiring removal, once in OR with sigmoidoscopy and once in ER who presents with abdominal pain. She also has a history of swallowing foreign objects. The patient underwent an exploratory laparotomy with removal of rectal foreign body on 1/10. The patient reports that she developed right lower quadrant abdominal pain 1-2 hours ago that woke her up from sleep. The pain persisted, prompting her to come to the ED for evaluation. On arrival to the ED, the patient reports she has right lower quadrant abdominal pain that is constant. She notes some nausea in the ED but denies any vomiting, diarrhea, or fever. The patient states she felt fine yesterday and denies any recent illness. She states there is not foreign body to worry about today.     Allergies:  Augmentin  Anti-Cw and non-specific antibodies  Hydrocodone  Influenza   Oseltamivir  Vicodin  Cephalosporins     Medications:    oxyCODONE-acetaminophen (PERCOCET) 5-325 MG per tablet  diazepam (VALIUM) 5 MG tablet  methylPREDNISolone (MEDROL DOSEPAK) 4 MG tablet  senna-docusate (SENOKOT-S;PERICOLACE) 8.6-50 MG per tablet  polyethylene glycol (MIRALAX/GLYCOLAX) powder  SUMATRIPTAN SUCCINATE PO  TOPIRAMATE PO  ondansetron (ZOFRAN) 4 MG tablet  Desvenlafaxine Succinate (PRISTIQ PO)  Cholecalciferol (VITAMIN D3 PO)    Past Medical History:    ADD  Anorexia nervosa with bulimia  Anxiety  Borderline personality disorder  Depression  Self-harm  Migraine without aura  Morbid obesity  PTSD  Rectal foreign body     Past Surgical History:    Mammoplasty reduction  Release carpal tunnel  Knee surgery  Ablation endometriosis  Removal foreign body with anesthesia  Exam under anesthesia anus  Exploratory laparotomy  Sigmoidoscopy flexible  Lymph node removed     Family History:   "  Cerebrovascular disease    Social History:  Smoking status: No  Alcohol use: No  Lives in independent group home  Marital Status:  Single     Review of Systems   Constitutional: Negative for chills and fever.   Respiratory: Negative for shortness of breath.    Gastrointestinal: Positive for abdominal pain and nausea. Negative for blood in stool, diarrhea and vomiting.   All other systems reviewed and are negative.      Physical Exam   Patient Vitals for the past 24 hrs:   BP Temp Temp src Pulse Resp SpO2 Height Weight   03/07/17 0445 132/88 - - - - - - -   03/07/17 0153 - 97.9  F (36.6  C) Oral 85 22 99 % 1.575 m (5' 2\") 103.4 kg (228 lb)       Physical Exam  Nursing note and vitals reviewed.  Constitutional: Cooperative. Tearful. Apprehensive of exam.   HENT:   Mouth/Throat: Mucous membranes are normal.   Cardiovascular: Normal rate, regular rhythm and normal heart sounds.  No murmur.  Pulmonary/Chest: Effort normal and breath sounds normal. No respiratory distress. No wheezes. No rales.   Abdominal: Right lower quadrant tenderness. Soft. Normal appearance and bowel sounds are normal. No distension. There is no rigidity and no guarding.   Neurological: Alert.   Skin: Skin is warm and dry.   Psychiatric: Normal mood and affect.     Emergency Department Course   Imaging:  Radiographic findings were communicated with the patient who voiced understanding of the findings.    CT-scan Abdomen/Pelvis w contrast:  1. There is a 5 cm left ovarian cyst.  2. No other acute abnormality. No bowel obstruction or inflammation. No appendicitis.  3. IUD in place.  Preliminary result per radiology.     US Pelvic Complete w transvaginal and Abd/Pel duplex limited  4.3 cm left ovarian cyst.  As read by Radiology.    Laboratory:  CBC: WBC 14.3(H), o/w WNL (HGB 11.9, )   CMP: Glucose 104(H), Creatinine 0.44(L), o/w WNL  Lipase: 157    UA: Negative    HCG qual: Negative    Interventions:  0251: Toradol 30 mg IV  0251: Morphine 4 " mg IV  0251: Zofran 4 mg IV  0251: NS 1L IV Bolus    Emergency Department Course:  Past medical records, nursing notes, and vitals reviewed.  0219: I performed an exam of the patient and obtained history, as documented above.  IV inserted and blood drawn.  The patient was sent for a CT abdomen pelvis while in the emergency department, findings above.  The patient was sent for a US pelvic while in the emergency department, findings above.    0441: I rechecked the patient. Explained findings to the patient.    I rechecked the patient.  Findings and plan explained to the Patient. Patient discharged home with instructions regarding supportive care, medications, and reasons to return. The importance of close follow-up was reviewed.     Impression & Plan      Medical Decision Making:  Nevin Alvarado is a 26 year old female with a significant history as detailed above including recurrent rectal foreign bodies who presents with right lower quadrant abdominal pain. She denies having anything in her rectum on this visit. CT scan is essentially unremarkable other than a left ovarian cyst. We see no abscess, free air, or complication from her recent surgical procedure. Ultrasound also shows a left ovarian cyst. This is essentially stable in size when compared to 2 months ago at 4cm. Her pain is on the right side so it is unlikely that this cyst represents the cause of her pain today. I am not concerned for ovarian torsion as again her pain lateralizes to the right side specifically. Urinalysis is unremarkable as are her labs. At this time, she is still in discomfort but without other findings there is no indication for admission or further work up. Plan of care will be supportive with return precautions discussed.     Diagnosis:    ICD-10-CM   1. RLQ abdominal pain R10.31   2. Left ovarian cyst N83.202     Disposition: Discharged to home    Meghan Pope  3/7/2017   [facility] EMERGENCY DEPARTMENT    I,  Meghan Pope, am serving as a scribe at 2:19 AM on 3/7/2017 to document services personally performed by Siddharth Soler MD based on my observations and the provider's statements to me.        Siddharth Soler MD  03/07/17 0510

## 2017-03-08 ENCOUNTER — TELEPHONE (OUTPATIENT)
Dept: NURSING | Facility: CLINIC | Age: 27
End: 2017-03-08

## 2017-03-08 DIAGNOSIS — G89.18 ACUTE POST-OPERATIVE PAIN: Primary | ICD-10-CM

## 2017-03-08 RX ORDER — OXYCODONE AND ACETAMINOPHEN 5; 325 MG/1; MG/1
1 TABLET ORAL EVERY 6 HOURS PRN
Qty: 30 TABLET | Refills: 0 | Status: SHIPPED | OUTPATIENT
Start: 2017-03-08 | End: 2017-04-01

## 2017-03-08 RX ORDER — ASPIRIN 81 MG
100 TABLET, DELAYED RELEASE (ENTERIC COATED) ORAL DAILY
Qty: 60 TABLET | Refills: 0 | Status: SHIPPED | OUTPATIENT
Start: 2017-03-08 | End: 2017-03-12

## 2017-03-08 RX ORDER — DIAZEPAM 5 MG
5 TABLET ORAL EVERY 12 HOURS PRN
Qty: 30 TABLET | Refills: 0 | Status: SHIPPED | OUTPATIENT
Start: 2017-03-08 | End: 2017-03-08

## 2017-03-08 RX ORDER — SENNOSIDES A AND B 8.6 MG/1
1 TABLET, FILM COATED ORAL 2 TIMES DAILY PRN
Qty: 60 TABLET | Refills: 0 | Status: SHIPPED | OUTPATIENT
Start: 2017-03-08 | End: 2017-07-11

## 2017-03-08 RX ORDER — DIAZEPAM 5 MG
5 TABLET ORAL EVERY 6 HOURS PRN
Qty: 30 TABLET | Refills: 0 | Status: SHIPPED | OUTPATIENT
Start: 2017-03-08 | End: 2017-04-01

## 2017-03-08 NOTE — TELEPHONE ENCOUNTER
Vanessa advised reg Dr Ramos's message.    She also requested Dr Ramos to send Rx on Colace 100mg 1 tab Bid, Percocet 1-2 tabs every 6 hrs prn for Pain, Diazepam 5 mg 1 every 6hrs for anal spasm and sennakot 1 tab bid prn.    Harshad CRAFT

## 2017-03-08 NOTE — TELEPHONE ENCOUNTER
rx in stack, left message with carmen to see if patient will  or if they would like it mailed to [facility]

## 2017-03-08 NOTE — TELEPHONE ENCOUNTER
Vanessa, nurse coordinator calling stating that patient showed up to ED with foreign body, would not elaborate on details.  Patient was brought into surgery and then administration realized that patient's care is restricted to [place] per [place].  Requesting note from you giving approval for surgery and care so that [facility] can be paid for encounter.  Please call Vanessa at [phone] for further information.  Routed message to you per Lori's recommendations.

## 2017-03-09 ENCOUNTER — APPOINTMENT (OUTPATIENT)
Dept: GENERAL RADIOLOGY | Facility: CLINIC | Age: 27
End: 2017-03-09
Attending: SURGERY
Payer: MEDICAID

## 2017-03-09 ENCOUNTER — APPOINTMENT (OUTPATIENT)
Dept: GENERAL RADIOLOGY | Facility: CLINIC | Age: 27
End: 2017-03-09
Attending: EMERGENCY MEDICINE
Payer: MEDICAID

## 2017-03-09 ENCOUNTER — ANESTHESIA EVENT (OUTPATIENT)
Dept: SURGERY | Facility: CLINIC | Age: 27
End: 2017-03-09
Payer: MEDICAID

## 2017-03-09 ENCOUNTER — HOSPITAL ENCOUNTER (EMERGENCY)
Facility: CLINIC | Age: 27
Discharge: NURSING, REHAB OR LONG-TERM CARE | End: 2017-03-09
Attending: EMERGENCY MEDICINE | Admitting: EMERGENCY MEDICINE
Payer: MEDICAID

## 2017-03-09 ENCOUNTER — ANESTHESIA (OUTPATIENT)
Dept: SURGERY | Facility: CLINIC | Age: 27
End: 2017-03-09
Payer: MEDICAID

## 2017-03-09 VITALS
DIASTOLIC BLOOD PRESSURE: 81 MMHG | HEIGHT: 62 IN | OXYGEN SATURATION: 98 % | SYSTOLIC BLOOD PRESSURE: 132 MMHG | RESPIRATION RATE: 20 BRPM | HEART RATE: 94 BPM | TEMPERATURE: 98.3 F

## 2017-03-09 DIAGNOSIS — T18.9XXA SWALLOWED FOREIGN BODY, INITIAL ENCOUNTER: ICD-10-CM

## 2017-03-09 DIAGNOSIS — T18.2XXA FOREIGN BODY IN STOMACH, INITIAL ENCOUNTER: ICD-10-CM

## 2017-03-09 LAB
ABO + RH BLD: ABNORMAL
ABO + RH BLD: ABNORMAL
ANION GAP SERPL CALCULATED.3IONS-SCNC: 8 MMOL/L (ref 3–14)
BASOPHILS # BLD AUTO: 0 10E9/L (ref 0–0.2)
BASOPHILS NFR BLD AUTO: 0 %
BLD GP AB SCN SERPL QL: ABNORMAL
BLOOD BANK CMNT PATIENT-IMP: ABNORMAL
BLOOD BANK CMNT PATIENT-IMP: ABNORMAL
BUN SERPL-MCNC: 15 MG/DL (ref 7–30)
CALCIUM SERPL-MCNC: 8.7 MG/DL (ref 8.5–10.1)
CHLORIDE SERPL-SCNC: 111 MMOL/L (ref 94–109)
CO2 SERPL-SCNC: 24 MMOL/L (ref 20–32)
CREAT SERPL-MCNC: 0.58 MG/DL (ref 0.52–1.04)
DIFFERENTIAL METHOD BLD: ABNORMAL
EOSINOPHIL # BLD AUTO: 0 10E9/L (ref 0–0.7)
EOSINOPHIL NFR BLD AUTO: 0 %
ERYTHROCYTE [DISTWIDTH] IN BLOOD BY AUTOMATED COUNT: 14.4 % (ref 10–15)
GFR SERPL CREATININE-BSD FRML MDRD: ABNORMAL ML/MIN/1.7M2
GLUCOSE SERPL-MCNC: 100 MG/DL (ref 70–99)
HCT VFR BLD AUTO: 36.1 % (ref 35–47)
HGB BLD-MCNC: 11.8 G/DL (ref 11.7–15.7)
IMM GRANULOCYTES # BLD: 0 10E9/L (ref 0–0.4)
IMM GRANULOCYTES NFR BLD: 0.1 %
INR PPP: 1.12 (ref 0.86–1.14)
LYMPHOCYTES # BLD AUTO: 1.2 10E9/L (ref 0.8–5.3)
LYMPHOCYTES NFR BLD AUTO: 8.2 %
MCH RBC QN AUTO: 29.6 PG (ref 26.5–33)
MCHC RBC AUTO-ENTMCNC: 32.7 G/DL (ref 31.5–36.5)
MCV RBC AUTO: 91 FL (ref 78–100)
MONOCYTES # BLD AUTO: 0.7 10E9/L (ref 0–1.3)
MONOCYTES NFR BLD AUTO: 4.7 %
NEUTROPHILS # BLD AUTO: 13.1 10E9/L (ref 1.6–8.3)
NEUTROPHILS NFR BLD AUTO: 87 %
NRBC # BLD AUTO: 0.1 10*3/UL
NRBC BLD AUTO-RTO: 0 /100
PLATELET # BLD AUTO: 261 10E9/L (ref 150–450)
POTASSIUM SERPL-SCNC: 4.1 MMOL/L (ref 3.4–5.3)
RADIOLOGIST FLAGS: ABNORMAL
RBC # BLD AUTO: 3.99 10E12/L (ref 3.8–5.2)
SODIUM SERPL-SCNC: 143 MMOL/L (ref 133–144)
SPECIMEN EXP DATE BLD: ABNORMAL
UPPER GI ENDOSCOPY: NORMAL
WBC # BLD AUTO: 15.1 10E9/L (ref 4–11)

## 2017-03-09 PROCEDURE — 74010 XR KUB: CPT | Mod: 52

## 2017-03-09 PROCEDURE — 27210794 ZZH OR GENERAL SUPPLY STERILE: Performed by: SURGERY

## 2017-03-09 PROCEDURE — 86901 BLOOD TYPING SEROLOGIC RH(D): CPT | Performed by: EMERGENCY MEDICINE

## 2017-03-09 PROCEDURE — 85610 PROTHROMBIN TIME: CPT | Performed by: EMERGENCY MEDICINE

## 2017-03-09 PROCEDURE — 71000027 ZZH RECOVERY PHASE 2 EACH 15 MINS: Performed by: SURGERY

## 2017-03-09 PROCEDURE — 85025 COMPLETE CBC W/AUTO DIFF WBC: CPT | Performed by: EMERGENCY MEDICINE

## 2017-03-09 PROCEDURE — 71000014 ZZH RECOVERY PHASE 1 LEVEL 2 FIRST HR: Performed by: SURGERY

## 2017-03-09 PROCEDURE — 25000125 ZZHC RX 250: Performed by: SURGERY

## 2017-03-09 PROCEDURE — 25000125 ZZHC RX 250: Performed by: NURSE ANESTHETIST, CERTIFIED REGISTERED

## 2017-03-09 PROCEDURE — 70360 X-RAY EXAM OF NECK: CPT

## 2017-03-09 PROCEDURE — 80048 BASIC METABOLIC PNL TOTAL CA: CPT | Performed by: EMERGENCY MEDICINE

## 2017-03-09 PROCEDURE — 74000 XR ABDOMEN PORT F1 VW: CPT

## 2017-03-09 PROCEDURE — 86900 BLOOD TYPING SEROLOGIC ABO: CPT | Performed by: EMERGENCY MEDICINE

## 2017-03-09 PROCEDURE — 99285 EMERGENCY DEPT VISIT HI MDM: CPT | Mod: Z6 | Performed by: EMERGENCY MEDICINE

## 2017-03-09 PROCEDURE — 25000565 ZZH ISOFLURANE, EA 15 MIN: Performed by: SURGERY

## 2017-03-09 PROCEDURE — 27210995 ZZH RX 272: Performed by: SURGERY

## 2017-03-09 PROCEDURE — 86850 RBC ANTIBODY SCREEN: CPT | Performed by: EMERGENCY MEDICINE

## 2017-03-09 PROCEDURE — 25000128 H RX IP 250 OP 636: Performed by: NURSE ANESTHETIST, CERTIFIED REGISTERED

## 2017-03-09 PROCEDURE — 36000053 ZZH SURGERY LEVEL 2 EA 15 ADDTL MIN - UMMC: Performed by: SURGERY

## 2017-03-09 PROCEDURE — 36000051 ZZH SURGERY LEVEL 2 1ST 30 MIN - UMMC: Performed by: SURGERY

## 2017-03-09 PROCEDURE — 37000008 ZZH ANESTHESIA TECHNICAL FEE, 1ST 30 MIN: Performed by: SURGERY

## 2017-03-09 PROCEDURE — 99285 EMERGENCY DEPT VISIT HI MDM: CPT | Mod: 25 | Performed by: EMERGENCY MEDICINE

## 2017-03-09 PROCEDURE — 37000009 ZZH ANESTHESIA TECHNICAL FEE, EACH ADDTL 15 MIN: Performed by: SURGERY

## 2017-03-09 PROCEDURE — 25800025 ZZH RX 258: Performed by: NURSE ANESTHETIST, CERTIFIED REGISTERED

## 2017-03-09 PROCEDURE — 71020 XR CHEST 2 VW: CPT

## 2017-03-09 RX ORDER — LIDOCAINE HYDROCHLORIDE 20 MG/ML
INJECTION, SOLUTION INFILTRATION; PERINEURAL PRN
Status: DISCONTINUED | OUTPATIENT
Start: 2017-03-09 | End: 2017-03-09

## 2017-03-09 RX ORDER — PROPOFOL 10 MG/ML
INJECTION, EMULSION INTRAVENOUS PRN
Status: DISCONTINUED | OUTPATIENT
Start: 2017-03-09 | End: 2017-03-09

## 2017-03-09 RX ORDER — ONDANSETRON 4 MG/1
4 TABLET, ORALLY DISINTEGRATING ORAL EVERY 30 MIN PRN
Status: DISCONTINUED | OUTPATIENT
Start: 2017-03-09 | End: 2017-03-09 | Stop reason: HOSPADM

## 2017-03-09 RX ORDER — ONDANSETRON 2 MG/ML
INJECTION INTRAMUSCULAR; INTRAVENOUS PRN
Status: DISCONTINUED | OUTPATIENT
Start: 2017-03-09 | End: 2017-03-09

## 2017-03-09 RX ORDER — SODIUM CHLORIDE, SODIUM LACTATE, POTASSIUM CHLORIDE, CALCIUM CHLORIDE 600; 310; 30; 20 MG/100ML; MG/100ML; MG/100ML; MG/100ML
INJECTION, SOLUTION INTRAVENOUS CONTINUOUS
Status: DISCONTINUED | OUTPATIENT
Start: 2017-03-09 | End: 2017-03-09 | Stop reason: HOSPADM

## 2017-03-09 RX ORDER — NALOXONE HYDROCHLORIDE 0.4 MG/ML
.1-.4 INJECTION, SOLUTION INTRAMUSCULAR; INTRAVENOUS; SUBCUTANEOUS
Status: DISCONTINUED | OUTPATIENT
Start: 2017-03-09 | End: 2017-03-09 | Stop reason: HOSPADM

## 2017-03-09 RX ORDER — DEXAMETHASONE SODIUM PHOSPHATE 4 MG/ML
INJECTION, SOLUTION INTRA-ARTICULAR; INTRALESIONAL; INTRAMUSCULAR; INTRAVENOUS; SOFT TISSUE PRN
Status: DISCONTINUED | OUTPATIENT
Start: 2017-03-09 | End: 2017-03-09

## 2017-03-09 RX ORDER — ONDANSETRON 2 MG/ML
4 INJECTION INTRAMUSCULAR; INTRAVENOUS EVERY 30 MIN PRN
Status: DISCONTINUED | OUTPATIENT
Start: 2017-03-09 | End: 2017-03-09 | Stop reason: HOSPADM

## 2017-03-09 RX ORDER — FENTANYL CITRATE 50 UG/ML
INJECTION, SOLUTION INTRAMUSCULAR; INTRAVENOUS PRN
Status: DISCONTINUED | OUTPATIENT
Start: 2017-03-09 | End: 2017-03-09

## 2017-03-09 RX ORDER — SODIUM CHLORIDE, SODIUM LACTATE, POTASSIUM CHLORIDE, CALCIUM CHLORIDE 600; 310; 30; 20 MG/100ML; MG/100ML; MG/100ML; MG/100ML
INJECTION, SOLUTION INTRAVENOUS CONTINUOUS PRN
Status: DISCONTINUED | OUTPATIENT
Start: 2017-03-09 | End: 2017-03-09

## 2017-03-09 RX ADMIN — SUCCINYLCHOLINE CHLORIDE 120 MG: 20 INJECTION, SOLUTION INTRAMUSCULAR; INTRAVENOUS at 17:00

## 2017-03-09 RX ADMIN — MIDAZOLAM HYDROCHLORIDE 2 MG: 1 INJECTION, SOLUTION INTRAMUSCULAR; INTRAVENOUS at 16:54

## 2017-03-09 RX ADMIN — LIDOCAINE HYDROCHLORIDE 100 MG: 20 INJECTION, SOLUTION INFILTRATION; PERINEURAL at 17:00

## 2017-03-09 RX ADMIN — PROPOFOL 200 MG: 10 INJECTION, EMULSION INTRAVENOUS at 17:00

## 2017-03-09 RX ADMIN — DEXAMETHASONE SODIUM PHOSPHATE 6 MG: 4 INJECTION, SOLUTION INTRAMUSCULAR; INTRAVENOUS at 17:25

## 2017-03-09 RX ADMIN — ONDANSETRON 4 MG: 2 INJECTION INTRAMUSCULAR; INTRAVENOUS at 17:18

## 2017-03-09 RX ADMIN — PHENYLEPHRINE HYDROCHLORIDE 100 MCG: 10 INJECTION, SOLUTION INTRAMUSCULAR; INTRAVENOUS; SUBCUTANEOUS at 17:23

## 2017-03-09 RX ADMIN — SODIUM CHLORIDE, POTASSIUM CHLORIDE, SODIUM LACTATE AND CALCIUM CHLORIDE: 600; 310; 30; 20 INJECTION, SOLUTION INTRAVENOUS at 16:54

## 2017-03-09 RX ADMIN — FENTANYL CITRATE 50 MCG: 50 INJECTION, SOLUTION INTRAMUSCULAR; INTRAVENOUS at 16:54

## 2017-03-09 ASSESSMENT — ENCOUNTER SYMPTOMS: SORE THROAT: 1

## 2017-03-09 NOTE — ED PROVIDER NOTES
History     Chief Complaint   Patient presents with     Pharyngitis     HPI  Nevin Alvarado is a 26 year old female with a history of PTSD, personality disorder, depression and anxiety who was brought in by ambulance for evaluation of throat pain. Patient states she was eating a brat for lunch this afternoon and developed pain in her throat. She states she then found a staple in her food. Patient called a nurse line and was instructed to come to the ED for evaluation.     I have reviewed the Medications, Allergies, Past Medical and Surgical History, and Social History in the [facility] system.    Past Medical History   Diagnosis Date     ADD (attention deficit disorder)      Anorexia nervosa with bulimia      history of; on Topamax     Anxiety      Borderline personality disorder      Depression      H/O self-harm      H/O sigmoidoscopy      H/O swallowed foreign body      recurrent     History of laparotomy 01/2010     removal of rectal foreign body     Lives in independent group home      due to debilitating mental illness     Migraine without aura      no known triggers; on Topamax bid and Imitrex PRN     Morbid obesity (H)      PTSD (post-traumatic stress disorder)      Rectal foreign body      Recurrent issue       Past Surgical History   Procedure Laterality Date     Mammoplasty reduction Bilateral      Release carpal tunnel Bilateral      Knee surgery Right      removed a small tissue mass from knee     Laparoscopic ablation endometriosis       Hc remove fecal impaction or fb w anesthesia N/A 12/18/2016     Procedure: REMOVE FECAL IMPACTION/FOREIGN BODY UNDER ANESTHESIA;  Surgeon: Soham Cano MD;  Location:  OR     Exam under anesthesia anus N/A 1/10/2017     Procedure: EXAM UNDER ANESTHESIA ANUS;  Surgeon: Annmarie Haynes MD;  Location: [facility] OR     Laparotomy exploratory N/A 1/10/2017     Procedure: LAPAROTOMY EXPLORATORY;  Surgeon: Annmarie Haynes MD;  Location: [facility] OR      Sigmoidoscopy flexible N/A 1/10/2017     Procedure: SIGMOIDOSCOPY FLEXIBLE;  Surgeon: Annmarie Haynes MD;  Location: [facility] OR     Lymph nodes removed from neck; benign  age 7     Esophagoscopy, gastroscopy, duodenoscopy (egd), combined N/A 3/9/2017     Procedure: COMBINED ESOPHAGOSCOPY, GASTROSCOPY, DUODENOSCOPY (EGD), REMOVE FOREIGN BODY;  Surgeon: Avis Guzmán MD;  Location: [facility] OR       Family History   Problem Relation Age of Onset     Type 2 Diabetes Maternal Grandmother      Type 2 Diabetes Paternal Grandmother      Breast Cancer Paternal Grandmother      CEREBROVASCULAR DISEASE Father 53     Myocardial Infarction No family hx of      Coronary Artery Disease Early Onset No family hx of      Ovarian Cancer No family hx of      Colon Cancer No family hx of        Social History   Substance Use Topics     Smoking status: Never Smoker     Smokeless tobacco: Never Used     Alcohol use No     No current facility-administered medications for this encounter.      Current Outpatient Prescriptions   Medication     docusate sodium (COLACE) 100 MG tablet     senna (SENOKOT) 8.6 MG tablet     oxyCODONE-acetaminophen (PERCOCET) 5-325 MG per tablet     diazepam (VALIUM) 5 MG tablet     oxyCODONE-acetaminophen (PERCOCET) 5-325 MG per tablet     diazepam (VALIUM) 5 MG tablet     methylPREDNISolone (MEDROL DOSEPAK) 4 MG tablet     senna-docusate (SENOKOT-S;PERICOLACE) 8.6-50 MG per tablet     polyethylene glycol (MIRALAX/GLYCOLAX) powder     SUMATRIPTAN SUCCINATE PO     TOPIRAMATE PO     ondansetron (ZOFRAN) 4 MG tablet     Desvenlafaxine Succinate (PRISTIQ PO)     Cholecalciferol (VITAMIN D3 PO)        Allergies   Allergen Reactions     Augmentin Swelling     Blood-Group Specific Substance Other (See Comments)     Patient has an anti-Cw and non-specific antibodies. Blood product orders may be delayed. Draw one red top and two purple top tubes for all type/screen/crossmatch orders.     Hydrocodone Nausea  and Vomiting     vomiting for days     Influenza Vaccines Other (See Comments)     Oseltamivir Hives     med stopped, PN: med stopped     Vicodin [Hydrocodone-Acetaminophen] Nausea and Vomiting     Cephalosporins Rash       Review of Systems   HENT: Positive for sore throat.    All other systems reviewed and are negative.      Physical Exam      Physical Exam   Constitutional: She is oriented to person, place, and time. Vital signs are normal. She appears well-developed and well-nourished.  Non-toxic appearance. She does not appear ill. No distress.   HENT:   Head: Normocephalic and atraumatic.   Mouth/Throat: Oropharynx is clear and moist and mucous membranes are normal. No trismus in the jaw. No oropharyngeal exudate, posterior oropharyngeal edema or posterior oropharyngeal erythema.   Eyes: Conjunctivae and EOM are normal. Pupils are equal, round, and reactive to light. No scleral icterus.   Neck: Normal range of motion. Neck supple. No JVD present. No tracheal deviation present. No thyromegaly present.   Cardiovascular: Normal rate, regular rhythm, normal heart sounds and intact distal pulses.  Exam reveals no gallop and no friction rub.    No murmur heard.  Pulmonary/Chest: Effort normal and breath sounds normal. No respiratory distress.   Abdominal: Soft. Bowel sounds are normal. She exhibits no distension and no mass. There is no tenderness. There is no rebound and no guarding.   Musculoskeletal: Normal range of motion. She exhibits no edema or tenderness.   Lymphadenopathy:     She has no cervical adenopathy.   Neurological: She is alert and oriented to person, place, and time. She has normal strength. No cranial nerve deficit or sensory deficit.   Skin: Skin is warm and dry. No rash noted. No erythema. No pallor.   Psychiatric: She has a normal mood and affect. Her behavior is normal.   Nursing note and vitals reviewed.      ED Course     ED Course     Procedures        Results for orders placed or  performed during the hospital encounter of 03/09/17   UPPER GI ENDOSCOPY   Result Value Ref Range    Upper GI Endoscopy       [facility], [place]  [address] [place] [address] [phone]     Endoscopy Department  _______________________________________________________________________________  Patient Name: Nevin Alvarado     Procedure Date: 3/9/2017 5:12 PM  MRN: [id number]                       Account Number: [id number]  YOB: 1990             Admit Type: Outpatient  Age: 26                                Gender: Female  Note Status: Finalized                Attending MD: Brice Guzmán MD  Pause for the Cause: completed        Total Sedation Time:   _______________________________________________________________________________     Procedure:           Upper GI endoscopy  Indications:         Foreign body in the stomach, patient with                        well-documented history of recurrent foreign body                        ingestions/insertions: for retrieval of 3 ingested                        staples (inserted into hot dog) ~4 hours prior to                         admission, confirmed in stomach on pre-procedure AXR  Providers:           Brice Guzmán MD, Christine Shelton MD  Referring MD:        Cornelio Basurto MD  Medicines:           General Anesthesia  Complications:       No immediate complications.  _______________________________________________________________________________  Procedure:           Pre-Anesthesia Assessment:                       - Prior to the procedure, a History and Physical was                        performed, and patient medications, allergies and                        sensitivities were reviewed. The patient's tolerance of                        previous anesthesia was reviewed.                       - The risks and benefits of the procedure and the                        sedation options and risks were discussed with the                         patient. All questions were answered and informed                        consent was obtained.                       - Patient identification and proposed pro cedure were                        verified prior to the procedure by the physician, the                        nurse and the anesthesiologist. The procedure was                        verified in the pre-procedure area in the procedure room.                       - Pre-procedure physical examination revealed no                        contraindications to sedation.                       - Airway Examination: Mallampati Class III (part of the                        uvula and soft palate visualized).                       - ASA Grade Assessment: II E - Emergency.                       After obtaining informed consent, the endoscope was                        passed under direct vision. Throughout the procedure,                        the patient's blood pressure, pulse, and oxygen                        saturations were monitored continuously. The Endoscope                        was introduced through the mouth, and advanced to the                        third part of duodenum.                                                                                    Findings:       The examined esophagus was normal.       A medium amount of solid meat + vegetable material was found in the        gastric fundus, corresponding with timing of food/staple ingestion.       Three open staples were found in the gastric fundus, one of which was        noted to be missing a emerald, located beneath solid food/vegetable        material; findings correspond to pre-procedure AXR imaging. Removal of        all 3 staples was successfully accomplished with a rat-toothed and        biopsy forceps, utilizing an endoscopic simons protector during        retrieval/withdrawal for safety.       No other significant abnormalities were identified in a careful         examination of the stomach. No overt evidence of gastric outlet        obstruction.       The examined duodenum was normal.                                                                                   Impression:          - Normal esop hagus.                       - A medium amount of solid meat + vegetable material was                        found in the gastric fundus, corresponding with timing                        of food/staple ingestion.                       - Three open staples were found in the gastric fundus,                        one of which was noted to be missing a emerald, located                        beneath solid food/vegetable material; findings                        correspond to pre-procedure AXR imaging. Removal of all                        3 staples was successfully accomplished with a                        rat-toothed and biopsy forceps, utilizing an endoscopic                        simons protector during retrieval/withdrawal for safety.                       - Stomach was otherwise normal on careful exam. No overt                        evidence of gastric outlet obstruction.                       - Normal examined duodenum.  Recommendation:      - Discharge patient to home from PACU with                         family/attendant.                       - Recommend 1:1 bedside attendant/sitter until                        discharged from PACU.                       - Perform a STAT KUB following procedure to confirm                        successful foreign body extraction.                       - The findings and recommendations were discussed with                        the patient.                                                                                     Electronically signed by GABBIE Guzmán  _________________  Brice Guzmán MD  3/9/2017 7:57 PM  I was physically present for the entire viewing portion of the exam.  __________________________  Signature of teaching  physician  [id number] Guzmán MD    Electronically signed by GABBIE Guzmán  _________________  Brice Guzmán MD  3/9/2017 7:57 PM  Number of Addenda: 0    Note Initiated On: 3/9/2017 5:12 PM     Neck soft tissue XR    Narrative    Exam: One view neck radiograph    HISTORY: Possible swallowed staple.    FINDINGS: No radiopaque foreign body in the neck.    Down to level of C6 is well visualized on lateral projection. Mild  cervical kyphosis. No prevertebral soft tissue swelling.      Impression    IMPRESSION: No radiopaque foreign body in the neck.    RIVER ROBERTSONAHASHI   KUB XR   Result Value Ref Range    Radiologist flags Foreign bodies in the stomach (Urgent)     Narrative    XR KUB  3/9/2017 3:28 PM      HISTORY: Possibly swallowed staple    COMPARISON: 12/18/2016    FINDINGS: 3 metallic objects appearing to be staples project over the  stomach. Nonobstructive bowel gas pattern. Mild colonic stool burden.  IUD partially visualized.      Impression    IMPRESSION: 3 staples projecting over the stomach.    [Urgent Result: Foreign bodies in the stomach]    Finding was identified on 3/9/2017 3:34 PM.     Dr. Basurto was contacted by Dr. Mullen at 3/9/2017 3:40 PM and  verbalized understanding of the urgent finding.     I have personally reviewed the examination and initial interpretation  and I agree with the findings.    FLETCHER LEGER MD   Chest XR,  PA & LAT    Narrative    XR CHEST 2 VW 3/9/2017 3:28 PM    CLINICAL HISTORY: Possibly swallowed staple    COMPARISON: Chest radiograph 5/7/2014. CT chest 5/31/2014.    FINDINGS: Cardiac mediastinal silhouette and pulmonary vasculature are  within normal limits. No acute pulmonary opacity. No pneumothorax or  pleural effusion.    Metallic density in shape the staple projects over the epigastrium.  Additional linear metallic foreign body projects left lateral to the  spine in the upper abdomen.      Impression    IMPRESSION:  1. 2 staples project over the upper  abdomen.  2. No acute pulmonary opacity.    I have personally reviewed the examination and initial interpretation  and I agree with the findings.    RICHELLE JACKSON MD   XR Abdomen Port 1 View    Narrative    Exam:  XR ABDOMEN PORT F1 VW, 3/9/2017 7:56 PM    History: Evaluate for foreign body-staples. Status post EGD.    Comparison:  Abdominal radiograph from 3/9/2017.    Findings:  Single portable view of the abdomen. Radiopaque foreign  objects are no longer visualized. No abnormally dilated loops of large  or small bowel. Moderate colonic stool burden is unchanged. No  pneumatosis or portal venous gas. No abnormal calcifications. IUD  visualized within the pelvis.      Impression    Impression:    1. No radiopaque foreign body identified.  2. Nonobstructive bowel gas pattern.    I have personally reviewed the examination and initial interpretation  and I agree with the findings.    CHINO PERAZA MD   CBC with platelets differential   Result Value Ref Range    WBC 15.1 (H) 4.0 - 11.0 10e9/L    RBC Count 3.99 3.8 - 5.2 10e12/L    Hemoglobin 11.8 11.7 - 15.7 g/dL    Hematocrit 36.1 35.0 - 47.0 %    MCV 91 78 - 100 fl    MCH 29.6 26.5 - 33.0 pg    MCHC 32.7 31.5 - 36.5 g/dL    RDW 14.4 10.0 - 15.0 %    Platelet Count 261 150 - 450 10e9/L    Diff Method Automated Method     % Neutrophils 87.0 %    % Lymphocytes 8.2 %    % Monocytes 4.7 %    % Eosinophils 0.0 %    % Basophils 0.0 %    % Immature Granulocytes 0.1 %    Nucleated RBCs 0 0 /100    Absolute Neutrophil 13.1 (H) 1.6 - 8.3 10e9/L    Absolute Lymphocytes 1.2 0.8 - 5.3 10e9/L    Absolute Monocytes 0.7 0.0 - 1.3 10e9/L    Absolute Eosinophils 0.0 0.0 - 0.7 10e9/L    Absolute Basophils 0.0 0.0 - 0.2 10e9/L    Abs Immature Granulocytes 0.0 0 - 0.4 10e9/L    Absolute Nucleated RBC 0.1    Basic metabolic panel   Result Value Ref Range    Sodium 143 133 - 144 mmol/L    Potassium 4.1 3.4 - 5.3 mmol/L    Chloride 111 (H) 94 - 109 mmol/L    Carbon Dioxide 24 20 - 32  mmol/L    Anion Gap 8 3 - 14 mmol/L    Glucose 100 (H) 70 - 99 mg/dL    Urea Nitrogen 15 7 - 30 mg/dL    Creatinine 0.58 0.52 - 1.04 mg/dL    GFR Estimate >90  Non  GFR Calc   >60 mL/min/1.7m2    GFR Estimate If Black >90   GFR Calc   >60 mL/min/1.7m2    Calcium 8.7 8.5 - 10.1 mg/dL   INR   Result Value Ref Range    INR 1.12 0.86 - 1.14   ABO/Rh type and screen   Result Value Ref Range    ABO O     RH(D)  Pos     Antibody Screen Pos (A)     Test Valid Only At       [facility]    Specimen Expires 03/12/2017     Blood Bank Comment       Delay in availability of Red Blood Cells called to  Angela Chumer [id number] 3/9/17 MY           Assessments & Plan (with Medical Decision Making)   This patient presented to the emergency department concerned that she may have swallowed staples while eating a bratDigitalSciroccot.  She has a past history significant for ingested foreign bodies as well as inserted rectal foreign bodies.  X-rays were obtained of the neck chest and abdomen that did demonstrate several open staples in the epigastric area.  I consulted with the GI and they will take the patient to the operating room for endoscopy and removal of the foreign bodies if they re able to visualize them.  It is unclear at this point whether or not this ingestion was intentional but the patient reports that it was not and is asking if she should be suing [employer] for damages and for medical bills.  I referred her to speak to  regarding this as I did not have a legal opinion.  This part of the document was transcribed by Sterling Mc, Medical Scribe.       I have reviewed the nursing notes.    I have reviewed the findings, diagnosis, plan and need for follow up with the patient.    Discharge Medication List as of 3/9/2017  8:48 PM          Final diagnoses:   Swallowed foreign body, initial encounter   I, Mavis Shah, am serving  as a trained medical scribe to document services personally performed by Cornelio Basurto MD, based on the provider's statements to me.      I, Cornelio Basurto MD, was physically present and have reviewed and verified the accuracy of this note documented by Mavis Shah.       3/9/2017   [facility], [place], EMERGENCY DEPARTMENT     Cornelio Basurto MD  03/10/17 0923

## 2017-03-09 NOTE — IP AVS SNAPSHOT
MRN:[id number]                      After Visit Summary   3/9/2017    Nevin Alvarado    MRN: [id number]           Thank you!     Thank you for choosing [facility] for your care. Our goal is always to provide you with excellent care. Hearing back from our patients is one way we can continue to improve our services. Please take a few minutes to complete the written survey that you may receive in the mail after you visit with us. Thank you!        Patient Information     Date Of Birth          1990        About your hospital stay     You were admitted on:  N/A You last received care in the:  Post Anesthesia Care Unit [facility]    You were discharged on:  March 9, 2017       Who to Call     For medical emergencies, please call 911.  For non-urgent questions about your medical care, please call your primary care provider or clinic, [phone]  For questions related to your surgery, please call your surgery clinic        Attending Provider     Provider Specialty    Cornelio Basurto MD Emergency Medicine       Primary Care Provider Office Phone # Fax #    Sandra Ramos [phone]       [address] [address]        After Care Instructions     Discharge Instructions       Resume pre procedure diet. Maalox OTC as needed for throat/esophagus discomfort.            Discharge Instructions       Restart home medications.                  Your next 10 appointments already scheduled     Mar 21, 2017 10:15 AM CDT   New Visit with Carli Thurman PA-C   [facility]    [address] [address] [phone]              Further instructions from your care team       Resume previous diet  Chew food slowly to avoid injestion of sharp objects.  May take Maalox for indigestion.    [facility]  Same-Day  Surgery   Adult Discharge Orders & Instructions     For 24 hours after surgery    1. Get plenty of rest.  A responsible adult must stay with you for at least 24 hours after you leave the hospital.   2. Do not drive or use heavy equipment.  If you have weakness or tingling, don't drive or use heavy equipment until this feeling goes away.  3. Do not drink alcohol.  4. Avoid strenuous or risky activities.  Ask for help when climbing stairs.   5. You may feel lightheaded.  IF so, sit for a few minutes before standing.  Have someone help you get up.   6. If you have nausea (feel sick to your stomach): Drink only clear liquids such as apple juice, ginger ale, broth or 7-Up.  Rest may also help.  Be sure to drink enough fluids.  Move to a regular diet as you feel able.  7. You may have a slight fever. Call the doctor if your fever is over 100 F (37.7 C) (taken under the tongue) or lasts longer than 24 hours.  8. You may have a dry mouth, a sore throat, muscle aches or trouble sleeping.  These should go away after 24 hours.  9. Do not make important or legal decisions.   Call your doctor for any of the followin.  Signs of infection (fever, growing tenderness at the surgery site, a large amount of drainage or bleeding, severe pain, foul-smelling drainage, redness, swelling).    2. It has been over 8 to 10 hours since surgery and you are still not able to urinate (pass water).    3.  Headache for over 24 hours.      To contact a doctor, call  or:        [phone] and ask for the resident on call for Gastroenterology (answered 24 hours a day)      Emergency Department:    [facility]: [phone]       (TTY for hearing impaired: [phone])              Pending Results     No orders found from 3/7/2017 to 3/10/2017.            Admission Information     Date & Time Department Dept. Phone    3/9/2017 Post Anesthesia Care Unit [facility] [phone]      Your Vitals Were     Blood Pressure Pulse  "Temperature Respirations Height Pulse Oximetry    118/95 94 98.3  F (36.8  C) (Oral) 16 1.575 m (5' 2\") 97%      [employer] Information     [employer] gives you secure access to your electronic health record. If you see a primary care provider, you can also send messages to your care team and make appointments. If you have questions, please call your primary care clinic.  If you do not have a primary care provider, please call [phone] and they will assist you.        Care EveryWhere ID     This is your Care EveryWhere ID. This could be used by other organizations to access your [facility] medical records  [id number]           Review of your medicines      UNREVIEWED medicines. Ask your doctor about these medicines        Dose / Directions    * diazepam 5 MG tablet   Commonly known as:  VALIUM        Dose:  5-10 mg   Take 1-2 tablets (5-10 mg) by mouth every 8 hours as needed for anxiety or sleep   Quantity:  20 tablet   Refills:  0       * diazepam 5 MG tablet   Commonly known as:  VALIUM   Used for:  Acute post-operative pain        Dose:  5 mg   Take 1 tablet (5 mg) by mouth every 6 hours as needed (anal spasms)   Quantity:  30 tablet   Refills:  0       docusate sodium 100 MG tablet   Commonly known as:  COLACE   Used for:  Acute post-operative pain        Dose:  100 mg   Take 100 mg by mouth daily   Quantity:  60 tablet   Refills:  0       methylPREDNISolone 4 MG tablet   Commonly known as:  MEDROL DOSEPAK        Follow package instructions   Quantity:  21 tablet   Refills:  0       ondansetron 4 MG tablet   Commonly known as:  ZOFRAN        Dose:  4 mg   Take 1 tablet (4 mg) by mouth every 6 hours   Quantity:  8 tablet   Refills:  0       * oxyCODONE-acetaminophen 5-325 MG per tablet   Commonly known as:  PERCOCET        Dose:  1-2 tablet   Take 1-2 tablets by mouth every 4 hours as needed for pain   Quantity:  15 tablet   Refills:  0       * oxyCODONE-acetaminophen 5-325 MG per tablet   Commonly known as:  " PERCOCET   Used for:  Acute post-operative pain        Dose:  1 tablet   Take 1 tablet by mouth every 6 hours as needed for pain   Quantity:  30 tablet   Refills:  0       polyethylene glycol powder   Commonly known as:  MIRALAX/GLYCOLAX   Used for:  Rectal foreign body, subsequent encounter        Dose:  17 g   Take 17 g by mouth daily as needed for constipation   Quantity:  510 g   Refills:  3       PRISTIQ PO        Dose:  100 mg   Take 100 mg by mouth daily   Refills:  0       senna 8.6 MG tablet   Commonly known as:  SENOKOT   Used for:  Acute post-operative pain        Dose:  1 tablet   Take 1 tablet by mouth 2 times daily as needed for constipation   Quantity:  60 tablet   Refills:  0       senna-docusate 8.6-50 MG per tablet   Commonly known as:  SENOKOT-S;PERICOLACE        Dose:  2 tablet   Take 2 tablets by mouth daily as needed for constipation   Refills:  0       SUMATRIPTAN SUCCINATE PO        Dose:  50 mg   Take 50 mg by mouth once as needed for migraine   Refills:  0       TOPIRAMATE PO        Take 50 mg by mouth in the morning and 100 mg by mouth in the evening   Refills:  0       VITAMIN D3 PO        Dose:  5000 Units   Take 5,000 Units by mouth daily   Refills:  0       * Notice:  This list has 4 medication(s) that are the same as other medications prescribed for you. Read the directions carefully, and ask your doctor or other care provider to review them with you.             Protect others around you: Learn how to safely use, store and throw away your medicines at www.disposemymeds.org.             Medication List: This is a list of all your medications and when to take them. Check marks below indicate your daily home schedule. Keep this list as a reference.      Medications           Morning Afternoon Evening Bedtime As Needed    * diazepam 5 MG tablet   Commonly known as:  VALIUM   Take 1-2 tablets (5-10 mg) by mouth every 8 hours as needed for anxiety or sleep                                *  diazepam 5 MG tablet   Commonly known as:  VALIUM   Take 1 tablet (5 mg) by mouth every 6 hours as needed (anal spasms)                                docusate sodium 100 MG tablet   Commonly known as:  COLACE   Take 100 mg by mouth daily                                methylPREDNISolone 4 MG tablet   Commonly known as:  MEDROL DOSEPAK   Follow package instructions                                ondansetron 4 MG tablet   Commonly known as:  ZOFRAN   Take 1 tablet (4 mg) by mouth every 6 hours                                * oxyCODONE-acetaminophen 5-325 MG per tablet   Commonly known as:  PERCOCET   Take 1-2 tablets by mouth every 4 hours as needed for pain                                * oxyCODONE-acetaminophen 5-325 MG per tablet   Commonly known as:  PERCOCET   Take 1 tablet by mouth every 6 hours as needed for pain                                polyethylene glycol powder   Commonly known as:  MIRALAX/GLYCOLAX   Take 17 g by mouth daily as needed for constipation                                PRISTIQ PO   Take 100 mg by mouth daily                                senna 8.6 MG tablet   Commonly known as:  SENOKOT   Take 1 tablet by mouth 2 times daily as needed for constipation                                senna-docusate 8.6-50 MG per tablet   Commonly known as:  SENOKOT-S;PERICOLACE   Take 2 tablets by mouth daily as needed for constipation                                SUMATRIPTAN SUCCINATE PO   Take 50 mg by mouth once as needed for migraine                                TOPIRAMATE PO   Take 50 mg by mouth in the morning and 100 mg by mouth in the evening                                VITAMIN D3 PO   Take 5,000 Units by mouth daily                                * Notice:  This list has 4 medication(s) that are the same as other medications prescribed for you. Read the directions carefully, and ask your doctor or other care provider to review them with you.

## 2017-03-09 NOTE — ED NOTES
Pt BIBA. Pt was eating a hot dog when she noticed a staple in it. Pt believes that she ingested a staple. Pt complains of throat irritation and a sharp pain in the stomach. Throat irritation started yesterday but since ingestion of hot dog, irritation has increased. Denies other sx. Pt was VSS for EMS.

## 2017-03-09 NOTE — ED NOTES
Bed: ED08  Expected date: 3/9/17  Expected time: 2:45 PM  Means of arrival: Ambulance  Comments:  [place]  26 y F  Eating a hot dog that had a staple in it, c/o of throat irritation  Yellow

## 2017-03-09 NOTE — ANESTHESIA PREPROCEDURE EVALUATION
Anesthesia Evaluation     . Pt has had prior anesthetic. Type: General    No history of anesthetic complications     ROS/MED HX    ENT/Pulmonary:     (+)ZOHRA risk factors obese, , . .    Neurologic:  - neg neurologic ROS     Cardiovascular:  - neg cardiovascular ROS   (+) ----. : . . . :. . Previous cardiac testing date:results:date: results:ECG reviewed date:01/10/2017 results:Normal sinus rhythm. date: results:          METS/Exercise Tolerance:  >4 METS   Hematologic:  - neg hematologic  ROS       Musculoskeletal:  - neg musculoskeletal ROS       GI/Hepatic:  - neg GI/hepatic ROS       Renal/Genitourinary:  - ROS Renal section negative       Endo:     (+) Obesity, .      Psychiatric: Comment: Diagnoses of PTSD, personality disorder, depression and anxiety.  The patient lives in an independent group home as a result of her severe mental illness.  She has a history of ingesting foreign objects requiring procedural/surgical intervention.      (+) psychiatric history anxiety, depression and other (comment)      Infectious Disease:  - neg infectious disease ROS       Malignancy:      - no malignancy   Other:    - neg other ROS                             Lab / Radiology Results:   Reviewed current labs when avail, see EMR for details.      BMP:  Recent Labs   Lab Test  03/07/17   0256   NA  142   POTASSIUM  3.7   CHLORIDE  109   CO2  22   BUN  11   CR  0.44*   GLC  104*   KELBY  8.6       LFTs:   Recent Labs   Lab Test  03/07/17   0256   PROTTOTAL  7.3   ALBUMIN  3.7   BILITOTAL  0.2   ALKPHOS  81   AST  15   ALT  20       CBC:   Recent Labs   Lab Test  03/07/17   0256   WBC  14.3*   HGB  11.9   PLT  268       Coags:  Recent Labs   Lab Test  01/31/15   2120   INR  1.10   PTT  37       Blood Bank:  Lab Results   Component Value Date    ABO Pending 03/09/2017    RH Pending 03/09/2017    AS Pending 03/09/2017       Studies:  See EMR for current studies, reviewed when available.      Anesthesia Plan      History & Physical  Review  History and physical reviewed and following examination; no interval change.    ASA Status:  2 emergent.    NPO Status:  Waived due to emergency    Plan for General, ETT and RSI with Intravenous induction. Maintenance will be Balanced.    PONV prophylaxis:  Ondansetron (or other 5HT-3) and Dexamethasone or Solumedrol       Postoperative Care  Postoperative pain management:  Multi-modal analgesia.      Consents  Anesthetic plan, risks, benefits and alternatives discussed with:  Patient.  Use of blood products discussed: No .   .      Erich Greenwood MD  Anesthesiologist  4:53 PM  March 9, 2017

## 2017-03-09 NOTE — ED NOTES
Denies possibility of pregnancy, denies being sexually active  And also had IUD  Pt decides to waive UPT

## 2017-03-09 NOTE — IP AVS SNAPSHOT
Post Anesthesia Care Unit [address] [address]    Phone:  [phone]                                       After Visit Summary   3/9/2017    Nevin Alvarado    MRN: [id number]           After Visit Summary Signature Page     I have received my discharge instructions, and my questions have been answered. I have discussed any challenges I see with this plan with the nurse or doctor.    ..........................................................................................................................................  Patient/Patient Representative Signature      ..........................................................................................................................................  Patient Representative Print Name and Relationship to Patient    ..................................................               ................................................  Date                                            Time    ..........................................................................................................................................  Reviewed by Signature/Title    ...................................................              ..............................................  Date                                                            Time

## 2017-03-09 NOTE — PROGRESS NOTES
Pt seen and examined w/ [facility] team this afternoon in ED - 26F w/ psychiatric history and long documented history of foreign body ingestions/insertions, including recent laparotomy for rectal foreign body 1/2017.     Recent ingestion earlier this afternoon of staples x3 inserted into a hot dog, open clip (rather than closed) appearance on AXR obtained today on presentation to ED. AXR personally reviewed.    Consent obtained for emergent EGD for foreign body removal (placed in chart), plan for OR case w/ GETA. Awaiting OR transfer currently, recommend 1:1 sitter now and following procedure for monitoring given clinical scenario.    Brice Guzmán MD   of Medicine  [facility] - Department of Medicine  Division of Gastroenterology

## 2017-03-10 NOTE — OR NURSING
Patient told staff in room, [facility], ST, CRNA, that she, the patient, called the  of the staples [in her esophagus] and they requested she have the staples saved after surgery and that she mail them to the  for them to send her replacement staples.      This information was shared with Dr. Guzmán.

## 2017-03-10 NOTE — DISCHARGE INSTRUCTIONS
Resume previous diet  Chew food slowly to avoid injestion of sharp objects.  May take Maalox for indigestion.    [facility] [place] [facility]  Same-Day Surgery   Adult Discharge Orders & Instructions     For 24 hours after surgery    1. Get plenty of rest.  A responsible adult must stay with you for at least 24 hours after you leave the hospital.   2. Do not drive or use heavy equipment.  If you have weakness or tingling, don't drive or use heavy equipment until this feeling goes away.  3. Do not drink alcohol.  4. Avoid strenuous or risky activities.  Ask for help when climbing stairs.   5. You may feel lightheaded.  IF so, sit for a few minutes before standing.  Have someone help you get up.   6. If you have nausea (feel sick to your stomach): Drink only clear liquids such as apple juice, ginger ale, broth or 7-Up.  Rest may also help.  Be sure to drink enough fluids.  Move to a regular diet as you feel able.  7. You may have a slight fever. Call the doctor if your fever is over 100 F (37.7 C) (taken under the tongue) or lasts longer than 24 hours.  8. You may have a dry mouth, a sore throat, muscle aches or trouble sleeping.  These should go away after 24 hours.  9. Do not make important or legal decisions.   Call your doctor for any of the followin.  Signs of infection (fever, growing tenderness at the surgery site, a large amount of drainage or bleeding, severe pain, foul-smelling drainage, redness, swelling).    2. It has been over 8 to 10 hours since surgery and you are still not able to urinate (pass water).    3.  Headache for over 24 hours.      To contact a doctor, call  Dr. Ramirez dockery at [phone] or:        [phone] and ask for the resident on call for Gastroenterology (answered 24 hours a day)      Emergency Department:    [facility]: [phone]       (TTY for hearing impaired: [phone])

## 2017-03-10 NOTE — ANESTHESIA CARE TRANSFER NOTE
Patient: Nevin Alvarado    Procedure(s):  COMBINED ESOPHAGOSCOPY, GASTROSCOPY, DUODENOSCOPY  removal foreign body - Wound Class: II-Clean Contaminated    Diagnosis: Foreign body Removal  Diagnosis Additional Information: No value filed.    Anesthesia Type:   General, ETT, RSI     Note:  Airway :Face Mask  Patient transferred to:PACU        Vitals: (Last set prior to Anesthesia Care Transfer)    CRNA VITALS  3/9/2017 1853 - 3/9/2017 1923      3/9/2017             NIBP: (!)  152/94                Electronically Signed By: HU Mei CRNA  March 9, 2017  7:23 PM

## 2017-03-10 NOTE — OR NURSING
Patient in phase 2 discharge, patient denies suicidal ideation or self harm. Reports ingestion of foreign body was an accident and wants medical record pictures of staples to file complaint with food company.  Discussed with Fellow Dr. Shelton who states patient does not need any psych eval before discharge to [facility].  Discharge instructions given to patient and to Becca (Adult  at [facility]. [phone]).

## 2017-03-10 NOTE — ANESTHESIA POSTPROCEDURE EVALUATION
Patient: Nevin Alvarado    Procedure(s):  COMBINED ESOPHAGOSCOPY, GASTROSCOPY, DUODENOSCOPY  removal foreign body - Wound Class: II-Clean Contaminated    Diagnosis:Foreign body Removal  Diagnosis Additional Information: No value filed.    Anesthesia Type:  General, ETT, RSI    Note:  Anesthesia Post Evaluation    Patient location during evaluation: PACU  Patient participation: Able to fully participate in evaluation  Level of consciousness: awake and alert  Pain management: adequate  Airway patency: patent  Cardiovascular status: acceptable and hemodynamically stable  Respiratory status: acceptable  Hydration status: acceptable  PONV: none     Anesthetic complications: None          Last vitals:  Vitals:    03/09/17 1505 03/09/17 1510 03/09/17 1615   BP:   (!) 118/95   Pulse:      Resp:      Temp:      SpO2: 96% 97% 97%         Electronically Signed By: Erich Greenwood MD  March 9, 2017  7:46 PM

## 2017-03-10 NOTE — OR NURSING
Patient arrived to OR 11 with a bag of clothing and her purse, which remained closed and in the OR room thoughout the case. She also arrived with elastic waist band pants, socks and shoes on. Her shoes were removed and kept on patient cart.

## 2017-03-10 NOTE — PROCEDURES
EGD completed for foreign body ingestion. 3 staples found in food debris in fundus. Removed with rat tooth/biopsy forceps and simons protector. Rajput net used to assist in food removal into distal stomach/duodenum.    Obtain post-EGD AXR. If AXR ok, can be discharged home from PACU.     Findings of exam discussed with patient post-procedure and questions answered.     Christine Shelton MD  GI Fellow

## 2017-03-12 ENCOUNTER — HOSPITAL ENCOUNTER (EMERGENCY)
Facility: CLINIC | Age: 27
Discharge: HOME | End: 2017-03-12
Attending: EMERGENCY MEDICINE | Admitting: EMERGENCY MEDICINE
Payer: MEDICAID

## 2017-03-12 ENCOUNTER — APPOINTMENT (OUTPATIENT)
Dept: GENERAL RADIOLOGY | Facility: CLINIC | Age: 27
End: 2017-03-12
Attending: EMERGENCY MEDICINE
Payer: MEDICAID

## 2017-03-12 VITALS
SYSTOLIC BLOOD PRESSURE: 131 MMHG | HEART RATE: 89 BPM | BODY MASS INDEX: 41.96 KG/M2 | OXYGEN SATURATION: 99 % | RESPIRATION RATE: 16 BRPM | DIASTOLIC BLOOD PRESSURE: 54 MMHG | HEIGHT: 62 IN | TEMPERATURE: 98 F | WEIGHT: 228 LBS

## 2017-03-12 DIAGNOSIS — R10.31 RLQ ABDOMINAL PAIN: ICD-10-CM

## 2017-03-12 DIAGNOSIS — K60.2 ANAL FISSURE: ICD-10-CM

## 2017-03-12 LAB
ALBUMIN UR-MCNC: NEGATIVE MG/DL
AMORPH CRY #/AREA URNS HPF: ABNORMAL /HPF
ANION GAP SERPL CALCULATED.3IONS-SCNC: 8 MMOL/L (ref 3–14)
APPEARANCE UR: ABNORMAL
BASOPHILS # BLD AUTO: 0 10E9/L (ref 0–0.2)
BASOPHILS NFR BLD AUTO: 0.3 %
BILIRUB UR QL STRIP: NEGATIVE
BUN SERPL-MCNC: 15 MG/DL (ref 7–30)
CALCIUM SERPL-MCNC: 8.9 MG/DL (ref 8.5–10.1)
CAOX CRY #/AREA URNS HPF: ABNORMAL /HPF
CHLORIDE SERPL-SCNC: 108 MMOL/L (ref 94–109)
CO2 SERPL-SCNC: 28 MMOL/L (ref 20–32)
COLOR UR AUTO: YELLOW
CREAT SERPL-MCNC: 0.61 MG/DL (ref 0.52–1.04)
DIFFERENTIAL METHOD BLD: ABNORMAL
EOSINOPHIL # BLD AUTO: 0.2 10E9/L (ref 0–0.7)
EOSINOPHIL NFR BLD AUTO: 1.7 %
ERYTHROCYTE [DISTWIDTH] IN BLOOD BY AUTOMATED COUNT: 14.1 % (ref 10–15)
GFR SERPL CREATININE-BSD FRML MDRD: NORMAL ML/MIN/1.7M2
GLUCOSE SERPL-MCNC: 94 MG/DL (ref 70–99)
GLUCOSE UR STRIP-MCNC: NEGATIVE MG/DL
HCG UR QL: NEGATIVE
HCT VFR BLD AUTO: 36.8 % (ref 35–47)
HGB BLD-MCNC: 12 G/DL (ref 11.7–15.7)
HGB UR QL STRIP: NEGATIVE
HYALINE CASTS #/AREA URNS LPF: 1 /LPF (ref 0–2)
IMM GRANULOCYTES # BLD: 0 10E9/L (ref 0–0.4)
IMM GRANULOCYTES NFR BLD: 0.3 %
KETONES UR STRIP-MCNC: NEGATIVE MG/DL
LEUKOCYTE ESTERASE UR QL STRIP: NEGATIVE
LYMPHOCYTES # BLD AUTO: 2.3 10E9/L (ref 0.8–5.3)
LYMPHOCYTES NFR BLD AUTO: 19.8 %
MCH RBC QN AUTO: 29.6 PG (ref 26.5–33)
MCHC RBC AUTO-ENTMCNC: 32.6 G/DL (ref 31.5–36.5)
MCV RBC AUTO: 91 FL (ref 78–100)
MONOCYTES # BLD AUTO: 1 10E9/L (ref 0–1.3)
MONOCYTES NFR BLD AUTO: 8.8 %
MUCOUS THREADS #/AREA URNS LPF: PRESENT /LPF
NEUTROPHILS # BLD AUTO: 7.9 10E9/L (ref 1.6–8.3)
NEUTROPHILS NFR BLD AUTO: 69.1 %
NITRATE UR QL: NEGATIVE
NRBC # BLD AUTO: 0 10*3/UL
NRBC BLD AUTO-RTO: 0 /100
PH UR STRIP: 7 PH (ref 5–7)
PLATELET # BLD AUTO: 241 10E9/L (ref 150–450)
POTASSIUM SERPL-SCNC: 3.6 MMOL/L (ref 3.4–5.3)
RBC # BLD AUTO: 4.06 10E12/L (ref 3.8–5.2)
RBC #/AREA URNS AUTO: 0 /HPF (ref 0–2)
SODIUM SERPL-SCNC: 144 MMOL/L (ref 133–144)
SP GR UR STRIP: 1.01 (ref 1–1.03)
SQUAMOUS #/AREA URNS AUTO: <1 /HPF (ref 0–1)
URN SPEC COLLECT METH UR: ABNORMAL
UROBILINOGEN UR STRIP-MCNC: 0 MG/DL (ref 0–2)
WBC # BLD AUTO: 11.5 10E9/L (ref 4–11)
WBC #/AREA URNS AUTO: <1 /HPF (ref 0–2)

## 2017-03-12 PROCEDURE — 81025 URINE PREGNANCY TEST: CPT | Performed by: EMERGENCY MEDICINE

## 2017-03-12 PROCEDURE — 80048 BASIC METABOLIC PNL TOTAL CA: CPT | Performed by: EMERGENCY MEDICINE

## 2017-03-12 PROCEDURE — 96361 HYDRATE IV INFUSION ADD-ON: CPT

## 2017-03-12 PROCEDURE — 74020 XR ABDOMEN 2 VW: CPT

## 2017-03-12 PROCEDURE — 81001 URINALYSIS AUTO W/SCOPE: CPT | Performed by: EMERGENCY MEDICINE

## 2017-03-12 PROCEDURE — 25000132 ZZH RX MED GY IP 250 OP 250 PS 637: Performed by: EMERGENCY MEDICINE

## 2017-03-12 PROCEDURE — 99284 EMERGENCY DEPT VISIT MOD MDM: CPT | Mod: 25

## 2017-03-12 PROCEDURE — 96374 THER/PROPH/DIAG INJ IV PUSH: CPT

## 2017-03-12 PROCEDURE — 25000128 H RX IP 250 OP 636: Performed by: EMERGENCY MEDICINE

## 2017-03-12 PROCEDURE — 85025 COMPLETE CBC W/AUTO DIFF WBC: CPT | Performed by: EMERGENCY MEDICINE

## 2017-03-12 RX ORDER — METHOCARBAMOL 750 MG/1
750 TABLET, FILM COATED ORAL 4 TIMES DAILY PRN
Qty: 20 TABLET | Refills: 0 | Status: SHIPPED | OUTPATIENT
Start: 2017-03-12 | End: 2017-04-01

## 2017-03-12 RX ORDER — OXYCODONE AND ACETAMINOPHEN 5; 325 MG/1; MG/1
1 TABLET ORAL ONCE
Status: COMPLETED | OUTPATIENT
Start: 2017-03-12 | End: 2017-03-12

## 2017-03-12 RX ORDER — IBUPROFEN 800 MG/1
800 TABLET, FILM COATED ORAL EVERY 8 HOURS PRN
Qty: 20 TABLET | Refills: 0 | Status: SHIPPED | OUTPATIENT
Start: 2017-03-12 | End: 2017-04-15

## 2017-03-12 RX ORDER — DOCUSATE SODIUM 100 MG/1
100 CAPSULE, LIQUID FILLED ORAL 2 TIMES DAILY
Qty: 20 CAPSULE | Refills: 0 | Status: SHIPPED | OUTPATIENT
Start: 2017-03-12 | End: 2017-04-01

## 2017-03-12 RX ORDER — ONDANSETRON 2 MG/ML
4 INJECTION INTRAMUSCULAR; INTRAVENOUS ONCE
Status: COMPLETED | OUTPATIENT
Start: 2017-03-12 | End: 2017-03-12

## 2017-03-12 RX ADMIN — SODIUM CHLORIDE 1000 ML: 9 INJECTION, SOLUTION INTRAVENOUS at 08:19

## 2017-03-12 RX ADMIN — OXYCODONE HYDROCHLORIDE AND ACETAMINOPHEN 1 TABLET: 5; 325 TABLET ORAL at 09:06

## 2017-03-12 RX ADMIN — ONDANSETRON 4 MG: 2 INJECTION INTRAMUSCULAR; INTRAVENOUS at 08:19

## 2017-03-12 ASSESSMENT — ENCOUNTER SYMPTOMS
DIARRHEA: 0
VOMITING: 0
ABDOMINAL PAIN: 1
BLOOD IN STOOL: 1

## 2017-03-12 NOTE — DISCHARGE INSTRUCTIONS
*Abdominal Pain, Unknown Cause (Female)    The exact cause of your abdominal (stomach) pain is not certain. This does not mean that this is something to worry about, or the right tests were not done. Everyone likes to know the exact cause of the problem, but sometimes with abdominal pain, there is no clear-cut cause, and this could be a good thing. The good news is that your symptoms can be treated, and you will feel better.   Your condition does not seem serious now; however, sometimes the signs of a serious problem may take more time to appear. For this reason, it is important for you to watch for any new symptoms, problems, or worsening of your condition.  Over the next few days, the abdominal pain may come and go, or be continuous. Other common symptoms can include nausea and vomiting. Sometimes it can be difficult to tell if you feel nauseous, you may just feel bad and not associate that feeling with nausea. Constipation, diarrhea, and a fever may go along with the pain.  The pain may continue even if treated correctly over the following days. Depending on how things go, sometimes the cause can become clear and may require further or different treatment. Additional evaluations, medications, or tests may be needed.  Home care  Your health care provider may prescribe medications for pain, symptoms, or an infection.  Follow the health care provider's instructions for taking these medications.  General care    Rest until your next exam. No strenuous activities.    Try to find positions that ease discomfort. A small pillow placed on the abdomen may help relieve pain.    Something warm on your abdomen (such as a heating pad) may help, but be careful not to burn yourself.  Diet    Do not force yourself to eat, especially if having cramps, vomiting, or diarrhea.    Water is important so you do not get dehydrated. Soup may also be good. Sports drinks may also help, especially if they are not too acidic. Make sure you  don't drink sugary drinks as this can make things worse. Take liquids in small amounts. Do not guzzle them.    Caffeine sometimes makes the pain and cramping worse.    Avoid dairy products if you have vomiting or diarrhea.    Don't eat large amounts at a time. Wait a few minutes between bites.    Eat a diet low in fiber (called a low-residue diet). Foods allowed include refined breads, white rice, fruit and vegetable juices without pulp, tender meats. These foods will pass more easily through the intestine.    Avoid fried or fatty foods, dairy, alcohol and spicy foods until your symptoms go away.  Follow-up care  Follow up with your health care provider as instructed, or if your pain does not begin to improve in the next 24 hours.  When to seek medical care  Seek prompt medical care if any of the following occur:    Pain gets worse or moves to the right lower abdomen    New or worsening vomiting or diarrhea    Swelling of the abdomen    Unable to pass stool for more than three days    New fever over 101  F (38.3 C), or rising fever    Blood in vomit or bowel movements (dark red or black color)    Jaundice (yellow color of eyes and skin)    Weakness, dizziness    Chest, arm, back, neck or jaw pain    Unexpected vaginal bleeding or missed period  Call 911  Call emergency services if any of the following occur:    Trouble breathing    Confusion    Fainting or loss of consciousness    Rapid heart rate    Seizure    [id number] Ric[facility], [address] [place] [address], [place] [address]. All rights reserved. This information is not intended as a substitute for professional medical care. Always follow your healthcare professional's instructions.

## 2017-03-12 NOTE — ED AVS SNAPSHOT
[facility] Emergency Department    [address] [place] [address]    Phone:  [phone]    Fax:  [phone]                                       Nevin Alvarado   MRN: [id number]    Department:  [facility] Emergency Department   Date of Visit:  3/12/2017           Patient Information     Date Of Birth          1990        Your diagnoses for this visit were:     RLQ abdominal pain     Anal fissure        You were seen by Ilya Snowden MD.      Follow-up Information     Follow up with Sandra Ramos MD In 1 day.    Specialty:  Internal Medicine    Contact information:    [facility]  [address] [facility] [address]  [phone]          Discharge Instructions         *Abdominal Pain, Unknown Cause (Female)    The exact cause of your abdominal (stomach) pain is not certain. This does not mean that this is something to worry about, or the right tests were not done. Everyone likes to know the exact cause of the problem, but sometimes with abdominal pain, there is no clear-cut cause, and this could be a good thing. The good news is that your symptoms can be treated, and you will feel better.   Your condition does not seem serious now; however, sometimes the signs of a serious problem may take more time to appear. For this reason, it is important for you to watch for any new symptoms, problems, or worsening of your condition.  Over the next few days, the abdominal pain may come and go, or be continuous. Other common symptoms can include nausea and vomiting. Sometimes it can be difficult to tell if you feel nauseous, you may just feel bad and not associate that feeling with nausea. Constipation, diarrhea, and a fever may go along with the pain.  The pain may continue even if treated correctly over the following days. Depending on how things go, sometimes the cause can become clear and may require further or different treatment.  Additional evaluations, medications, or tests may be needed.  Home care  Your health care provider may prescribe medications for pain, symptoms, or an infection.  Follow the health care provider's instructions for taking these medications.  General care    Rest until your next exam. No strenuous activities.    Try to find positions that ease discomfort. A small pillow placed on the abdomen may help relieve pain.    Something warm on your abdomen (such as a heating pad) may help, but be careful not to burn yourself.  Diet    Do not force yourself to eat, especially if having cramps, vomiting, or diarrhea.    Water is important so you do not get dehydrated. Soup may also be good. Sports drinks may also help, especially if they are not too acidic. Make sure you don't drink sugary drinks as this can make things worse. Take liquids in small amounts. Do not guzzle them.    Caffeine sometimes makes the pain and cramping worse.    Avoid dairy products if you have vomiting or diarrhea.    Don't eat large amounts at a time. Wait a few minutes between bites.    Eat a diet low in fiber (called a low-residue diet). Foods allowed include refined breads, white rice, fruit and vegetable juices without pulp, tender meats. These foods will pass more easily through the intestine.    Avoid fried or fatty foods, dairy, alcohol and spicy foods until your symptoms go away.  Follow-up care  Follow up with your health care provider as instructed, or if your pain does not begin to improve in the next 24 hours.  When to seek medical care  Seek prompt medical care if any of the following occur:    Pain gets worse or moves to the right lower abdomen    New or worsening vomiting or diarrhea    Swelling of the abdomen    Unable to pass stool for more than three days    New fever over 101  F (38.3 C), or rising fever    Blood in vomit or bowel movements (dark red or black color)    Jaundice (yellow color of eyes and skin)    Weakness,  dizziness    Chest, arm, back, neck or jaw pain    Unexpected vaginal bleeding or missed period  Call 911  Call emergency services if any of the following occur:    Trouble breathing    Confusion    Fainting or loss of consciousness    Rapid heart rate    Seizure    [id number] [facility], [address] [place] [address], [place] [address]. All rights reserved. This information is not intended as a substitute for professional medical care. Always follow your healthcare professional's instructions.          Future Appointments        Provider Department Dept Phone Center    3/21/2017 10:15 AM Carli Chiang PA-C [facility] [phone]       24 Hour Appointment Hotline       To make an appointment at any [facility], call [phone] ([phone]). If you don't have a family doctor or clinic, we will help you find one. [facility] are conveniently located to serve the needs of you and your family.             Review of your medicines      START taking        Dose / Directions Last dose taken    docusate sodium 100 MG capsule   Commonly known as:  COLACE   Dose:  100 mg   Quantity:  20 capsule   Replaces:  docusate sodium 100 MG tablet        Take 1 capsule (100 mg) by mouth 2 times daily   Refills:  0        ibuprofen 800 MG tablet   Commonly known as:  ADVIL/MOTRIN   Dose:  800 mg   Quantity:  20 tablet        Take 1 tablet (800 mg) by mouth every 8 hours as needed for moderate pain   Refills:  0        methocarbamol 750 MG tablet   Commonly known as:  ROBAXIN   Dose:  750 mg   Quantity:  20 tablet        Take 1 tablet (750 mg) by mouth 4 times daily as needed for muscle spasms   Refills:  0          Our records show that you are taking the medicines listed below. If these are incorrect, please call your family doctor or clinic.        Dose / Directions Last dose taken    * diazepam 5 MG tablet   Commonly known as:  VALIUM   Dose:  5-10 mg   Quantity:  20 tablet        Take  1-2 tablets (5-10 mg) by mouth every 8 hours as needed for anxiety or sleep   Refills:  0        * diazepam 5 MG tablet   Commonly known as:  VALIUM   Dose:  5 mg   Quantity:  30 tablet        Take 1 tablet (5 mg) by mouth every 6 hours as needed (anal spasms)   Refills:  0        methylPREDNISolone 4 MG tablet   Commonly known as:  MEDROL DOSEPAK   Quantity:  21 tablet        Follow package instructions   Refills:  0        ondansetron 4 MG tablet   Commonly known as:  ZOFRAN   Dose:  4 mg   Quantity:  8 tablet        Take 1 tablet (4 mg) by mouth every 6 hours   Refills:  0        * oxyCODONE-acetaminophen 5-325 MG per tablet   Commonly known as:  PERCOCET   Dose:  1-2 tablet   Quantity:  15 tablet        Take 1-2 tablets by mouth every 4 hours as needed for pain   Refills:  0        * oxyCODONE-acetaminophen 5-325 MG per tablet   Commonly known as:  PERCOCET   Dose:  1 tablet   Quantity:  30 tablet        Take 1 tablet by mouth every 6 hours as needed for pain   Refills:  0        polyethylene glycol powder   Commonly known as:  MIRALAX/GLYCOLAX   Dose:  17 g   Quantity:  510 g        Take 17 g by mouth daily as needed for constipation   Refills:  3        PRISTIQ PO   Dose:  100 mg        Take 100 mg by mouth daily   Refills:  0        senna 8.6 MG tablet   Commonly known as:  SENOKOT   Dose:  1 tablet   Quantity:  60 tablet        Take 1 tablet by mouth 2 times daily as needed for constipation   Refills:  0        senna-docusate 8.6-50 MG per tablet   Commonly known as:  SENOKOT-S;PERICOLACE   Dose:  2 tablet        Take 2 tablets by mouth daily as needed for constipation   Refills:  0        SUMATRIPTAN SUCCINATE PO   Dose:  50 mg        Take 50 mg by mouth once as needed for migraine   Refills:  0        TOPIRAMATE PO        Take 50 mg by mouth in the morning and 100 mg by mouth in the evening   Refills:  0        VITAMIN D3 PO   Dose:  5000 Units        Take 5,000 Units by mouth daily   Refills:  0        *  Notice:  This list has 4 medication(s) that are the same as other medications prescribed for you. Read the directions carefully, and ask your doctor or other care provider to review them with you.      STOP taking        Dose Reason for stopping Comments    docusate sodium 100 MG tablet   Commonly known as:  COLACE   Replaced by:  docusate sodium 100 MG capsule                      Prescriptions were sent or printed at these locations (3 Prescriptions)                   Other Prescriptions                Printed at Department/Unit printer (3 of 3)         ibuprofen (ADVIL/MOTRIN) 800 MG tablet               methocarbamol (ROBAXIN) 750 MG tablet               docusate sodium (COLACE) 100 MG capsule                Procedures and tests performed during your visit     Abdomen XR, 2 vw, flat and upright    Basic metabolic panel (BMP)    CBC + differential    HCG qualitative urine    IV access    UA with Microscopic      Orders Needing Specimen Collection     None      Pending Results     Date and Time Order Name Status Description    3/12/2017 0718 Abdomen XR, 2 vw, flat and upright Preliminary             Pending Culture Results     No orders found from 3/10/2017 to 3/13/2017.             Test Results from your hospital stay     3/12/2017  8:07 AM - Interface, [employer] Results      Component Results     Component Value Ref Range & Units Status    WBC 11.5 (H) 4.0 - 11.0 10e9/L Final    RBC Count 4.06 3.8 - 5.2 10e12/L Final    Hemoglobin 12.0 11.7 - 15.7 g/dL Final    Hematocrit 36.8 35.0 - 47.0 % Final    MCV 91 78 - 100 fl Final    MCH 29.6 26.5 - 33.0 pg Final    MCHC 32.6 31.5 - 36.5 g/dL Final    RDW 14.1 10.0 - 15.0 % Final    Platelet Count 241 150 - 450 10e9/L Final    Diff Method Automated Method  Final    % Neutrophils 69.1 % Final    % Lymphocytes 19.8 % Final    % Monocytes 8.8 % Final    % Eosinophils 1.7 % Final    % Basophils 0.3 % Final    % Immature Granulocytes 0.3 % Final    Nucleated RBCs 0 0 /100  Final    Absolute Neutrophil 7.9 1.6 - 8.3 10e9/L Final    Absolute Lymphocytes 2.3 0.8 - 5.3 10e9/L Final    Absolute Monocytes 1.0 0.0 - 1.3 10e9/L Final    Absolute Eosinophils 0.2 0.0 - 0.7 10e9/L Final    Absolute Basophils 0.0 0.0 - 0.2 10e9/L Final    Abs Immature Granulocytes 0.0 0 - 0.4 10e9/L Final    Absolute Nucleated RBC 0.0  Final         3/12/2017  8:54 AM - Interface, Radiant Ib      Narrative     ABDOMEN TWO VIEWS  3/12/2017 8:51 AM     COMPARISON: Frontal view of the abdomen 3/9/2017.    HISTORY: Abdominal pain.    FINDINGS: Abdominal bowel gas pattern is nonspecific. There is no  evidence for free intraperitoneal air.        Impression     IMPRESSION: No evidence for bowel obstruction or free air.         3/12/2017  8:21 AM - Interface, [employer]il[employer] Results      Component Results     Component Value Ref Range & Units Status    Sodium 144 133 - 144 mmol/L Final    Potassium 3.6 3.4 - 5.3 mmol/L Final    Chloride 108 94 - 109 mmol/L Final    Carbon Dioxide 28 20 - 32 mmol/L Final    Anion Gap 8 3 - 14 mmol/L Final    Glucose 94 70 - 99 mg/dL Final    Urea Nitrogen 15 7 - 30 mg/dL Final    Creatinine 0.61 0.52 - 1.04 mg/dL Final    GFR Estimate >90  Non  GFR Calc   >60 mL/min/1.7m2 Final    GFR Estimate If Black >90   GFR Calc   >60 mL/min/1.7m2 Final    Calcium 8.9 8.5 - 10.1 mg/dL Final         3/12/2017  9:48 AM - Interface, Flexilab Results      Component Results     Component Value Ref Range & Units Status    Color Urine Yellow  Final    Appearance Urine Slightly Cloudy  Final    Glucose Urine Negative NEG mg/dL Final    Bilirubin Urine Negative NEG Final    Ketones Urine Negative NEG mg/dL Final    Specific Gravity Urine 1.014 1.003 - 1.035 Final    Blood Urine Negative NEG Final    pH Urine 7.0 5.0 - 7.0 pH Final    Protein Albumin Urine Negative NEG mg/dL Final    Urobilinogen mg/dL 0.0 0.0 - 2.0 mg/dL Final    Nitrite Urine Negative NEG Final    Leukocyte Esterase  Urine Negative NEG Final    Source Midstream Urine  Final    WBC Urine <1 0 - 2 /HPF Final    RBC Urine 0 0 - 2 /HPF Final    Squamous Epithelial /HPF Urine <1 0 - 1 /HPF Final    Mucous Urine Present (A) NEG /LPF Final    Hyaline Casts 1 0 - 2 /LPF Final    Calcium Oxalate Few (A) NEG /HPF Final    Amorphous Crystals Many (A) NEG /HPF Final         3/12/2017  9:42 AM - Interface, Flexilab Results      Component Results     Component Value Ref Range & Units Status    HCG Qual Urine Negative NEG Final                Clinical Quality Measure: Blood Pressure Screening     Your blood pressure was checked while you were in the emergency department today. The last reading we obtained was  BP: 114/83 . Please read the guidelines below about what these numbers mean and what you should do about them.  If your systolic blood pressure (the top number) is less than 120 and your diastolic blood pressure (the bottom number) is less than 80, then your blood pressure is normal. There is nothing more that you need to do about it.  If your systolic blood pressure (the top number) is 120-139 or your diastolic blood pressure (the bottom number) is 80-89, your blood pressure may be higher than it should be. You should have your blood pressure rechecked within a year by a primary care provider.  If your systolic blood pressure (the top number) is 140 or greater or your diastolic blood pressure (the bottom number) is 90 or greater, you may have high blood pressure. High blood pressure is treatable, but if left untreated over time it can put you at risk for heart attack, stroke, or kidney failure. You should have your blood pressure rechecked by a primary care provider within the next 4 weeks.  If your provider in the emergency department today gave you specific instructions to follow-up with your doctor or provider even sooner than that, you should follow that instruction and not wait for up to 4 weeks for your follow-up visit.         Thank you for choosing [facility]       Thank you for choosing [facility] for your care. Our goal is always to provide you with excellent care. Hearing back from our patients is one way we can continue to improve our services. Please take a few minutes to complete the written survey that you may receive in the mail after you visit with us. Thank you!        [employer] Information     [employer] gives you secure access to your electronic health record. If you see a primary care provider, you can also send messages to your care team and make appointments. If you have questions, please call your primary care clinic.  If you do not have a primary care provider, please call [phone] and they will assist you.        Care EveryWhere ID     This is your Care EveryWhere ID. This could be used by other organizations to access your [facility] medical records  [id number]        After Visit Summary       This is your record. Keep this with you and show to your community pharmacist(s) and doctor(s) at your next visit.

## 2017-03-12 NOTE — ED AVS SNAPSHOT
[facility] Emergency Department    [address]    [facility] [address]    Phone:  [phone]    Fax:  [phone]                                       Nevin Alvarado   MRN: [id number]    Department:  [facility] Emergency Department   Date of Visit:  3/12/2017           After Visit Summary Signature Page     I have received my discharge instructions, and my questions have been answered. I have discussed any challenges I see with this plan with the nurse or doctor.    ..........................................................................................................................................  Patient/Patient Representative Signature      ..........................................................................................................................................  Patient Representative Print Name and Relationship to Patient    ..................................................               ................................................  Date                                            Time    ..........................................................................................................................................  Reviewed by Signature/Title    ...................................................              ..............................................  Date                                                            Time

## 2017-03-12 NOTE — ED NOTES
Pt presents to ED via EMS for evaluation of 5 days of abdominal pain.  Seen at [facility] on Friday after eating staples.  States streaks of blood in stool.    ABCs intact.  AOx4

## 2017-03-16 ENCOUNTER — TRANSFERRED RECORDS (OUTPATIENT)
Dept: HEALTH INFORMATION MANAGEMENT | Facility: CLINIC | Age: 27
End: 2017-03-16

## 2017-03-17 ENCOUNTER — TRANSFERRED RECORDS (OUTPATIENT)
Dept: HEALTH INFORMATION MANAGEMENT | Facility: CLINIC | Age: 27
End: 2017-03-17

## 2017-03-17 ENCOUNTER — TELEPHONE (OUTPATIENT)
Dept: NURSING | Facility: CLINIC | Age: 27
End: 2017-03-17

## 2017-03-18 NOTE — TELEPHONE ENCOUNTER
Call Type: Triage Call    Presenting Problem: Hospital forgot to take out her IV. I walked her  through doing that. Loosen tape, pull in one quick motion to get  catheter out. Put pressure for ten minutes by the clock with a clean  cotton ball. After 10 minutes, if it's still oozing, apply more  pressure for ten minutes. Once it's not leaking, put bandaide over  site, as tightly as possible.  Triage Note:  Guideline Title: No Guideline - Advice Per Reference (Adult)  Recommended Disposition: Provide Home/Self Care  Original Inclination: Did not know what to do  Override Disposition:  Intended Action: Follow advice given  Physician Contacted: No  PROVIDE HOME/SELF CARE ?  YES  SEE ED IMMEDIATELY ? NO  CALL POISON CENTER IMMEDIATELY ? NO  CALL LOCAL AGENCY IMMEDIATELY ? NO  SEE DENTIST WITHIN 4 HOURS ? NO  SEE DENTIST WITHIN 24 HOURS ? NO  CALL LOCAL AGENCY WITHIN 24 HOURS ? NO  SEE DENTIST WITHIN 72 HOURS ? NO  ACTIVATE  ? NO  SEE PROVIDER WITHIN 4 HOURS ? NO  SEE PROVIDER WITHIN 24 HOURS ? NO  CALL PROVIDER WITHIN 24 HOURS ? NO  SEE PROVIDER WITHIN 72 HOURS ? NO  SEE PROVIDER WITHIN 2 WEEKS ? NO  CALL PROVIDER WITHIN 72 HOURS ? NO  SEE DENTIST WITHIN 2 WEEKS ? NO  CALL PROVIDER IMMEDIATELY ? NO  Physician Instructions:  Care Advice:

## 2017-03-21 ENCOUNTER — OFFICE VISIT (OUTPATIENT)
Dept: DERMATOLOGY | Facility: CLINIC | Age: 27
End: 2017-03-21
Payer: MEDICAID

## 2017-03-21 VITALS — OXYGEN SATURATION: 98 % | SYSTOLIC BLOOD PRESSURE: 130 MMHG | HEART RATE: 91 BPM | DIASTOLIC BLOOD PRESSURE: 83 MMHG

## 2017-03-21 DIAGNOSIS — L30.9 ACUTE DERMATITIS: Primary | ICD-10-CM

## 2017-03-21 DIAGNOSIS — D48.5 NEOPLASM OF UNCERTAIN BEHAVIOR OF SKIN: ICD-10-CM

## 2017-03-21 PROCEDURE — 11100 HC BIOPSY SKIN/SUBQ/MUC MEM, SINGLE LESION: CPT | Performed by: PHYSICIAN ASSISTANT

## 2017-03-21 PROCEDURE — 88305 TISSUE EXAM BY PATHOLOGIST: CPT | Mod: 26 | Performed by: PHYSICIAN ASSISTANT

## 2017-03-21 PROCEDURE — 99202 OFFICE O/P NEW SF 15 MIN: CPT | Mod: 25 | Performed by: PHYSICIAN ASSISTANT

## 2017-03-21 PROCEDURE — 88305 TISSUE EXAM BY PATHOLOGIST: CPT | Performed by: PHYSICIAN ASSISTANT

## 2017-03-21 RX ORDER — BETAMETHASONE DIPROPIONATE 0.5 MG/G
OINTMENT, AUGMENTED TOPICAL
Qty: 45 G | Refills: 3 | Status: ON HOLD | OUTPATIENT
Start: 2017-03-21 | End: 2017-04-21

## 2017-03-21 NOTE — PROGRESS NOTES
HPI:   Nvein Alvarado is a 26 year old female who presents for evaluation of a rash on the legs  chief complaint  Location: bilateral lower legs   Condition present for:  About 1 year.   Previous treatments include: had topical creams prescribed but doesn't remember the names of them     Review Of Systems  Eyes: negative  Ears/Nose/Throat: negative  Respiratory: No shortness of breath, dyspnea on exertion, cough, or hemoptysis  Cardiovascular: negative  Gastrointestinal: negative  Genitourinary: negative  Musculoskeletal: negative  Neurologic: negative  Psychiatric: negative        PHYSICAL EXAM:      Skin exam performed as follows: Type 2 skin. Mood appropriate  Alert and Oriented X 3. Well developed, well nourished in no distress.  General appearance: Normal  Head including face: Normal  Eyes: conjunctiva and lids: Normal  Mouth: Lips, teeth, gums: Normal  Neck: Normal  Chest-breast/axillae: Normal  Back: Normal  Spleen and liver: Normal  Cardiovascular: Exam of peripheral vascular system by observation for swelling, varicosities, edema: Normal  Genitalia: groin, buttocks: Normal  Extremities: digits/nails (clubbing): Normal  Eccrine and Apocrine glands: Normal  Right upper extremity: Normal  Left upper extremity: Normal  Right lower extremity: Normal  Left lower extremity: Normal  Skin: Scalp and body hair: See below    1. Pink slightly atrophic patches on bilateral shins    ASSESSMENT/PLAN:     1. R/o spong derm vs stasis derm vs morphea vs other on right shin. Discussed differential and treatment options. She does not use moisturizer and does appear dry today. Will biopsy today and initiate topical tx.   --Deep Shave bx in typical fashion from right shin.  Area cleaned with betadyne and anesthetized with 1% lidocaine with epi .  Dermablade used to remove the lesion and sent to pathology. Bleeding was cauterized. Pt tolerated procedure well.  --Start gentle skin care - written instructions  provided  --Start betamethasone cream BID x 2-3 weeks then PRN        Follow-up: 3 weeks  CC:   Scribed By: Carli Thurman, MS, PA-C

## 2017-03-21 NOTE — NURSING NOTE
"Initial /83  Pulse 91  SpO2 98% Estimated body mass index is 41.7 kg/(m^2) as calculated from the following:    Height as of 3/12/17: 1.575 m (5' 2\").    Weight as of 3/12/17: 103.4 kg (228 lb). .      "

## 2017-03-21 NOTE — MR AVS SNAPSHOT
After Visit Summary   3/21/2017    Nevin Alvarado    MRN: [id number]           Patient Information     Date Of Birth          1990        Visit Information        Provider Department      3/21/2017 10:15 AM Carli Thurman PA-C [facility]        Today's Diagnoses     Acute dermatitis    -  1    Neoplasm of uncertain behavior of skin          Care Instructions    Gentle skin care instructions    Bathe every day - we recommend short showers or baths (5 minutes or less) with lukewarm water.  Use a gentle soap such as Dove for sensitive skin, Cetaphil, Vanicream or CeraVe   Wash the  dirty areas  only - this means armpits, groin, buttocks and feet.   After the bath or shower, within 2 minutes:   1. Pat dry with towel    2. If prescribed a topical prescription, apply this FIRST to any red/rashy/itchy areas - betamethasone ointment - apply this twice per day for 2-3 weeks, then only when needed    3. Apply a moisturizer to entire body, even where you just put the prescription medicine. We recommend CeraVe cream, Cetaphil cream or Vanicream. You will do this every single day. If you don t bathe every day we still recommend an application of body moisturizer.         Wound Care Instructions     FOR SUPERFICIAL WOUNDS     [facility] [phone]    [facility] [phone]                       AFTER 24 HOURS YOU SHOULD REMOVE THE BANDAGE AND BEGIN DAILY DRESSING CHANGES AS FOLLOWS:     1) Remove Dressing.     2) Clean and dry the area with tap water using a Q-tip or sterile gauze pad.     3) Apply Vaseline, Aquaphor, Polysporin ointment or Bacitracin ointment over entire wound.  Do NOT use Neosporin ointment.     4) Cover the wound with a band-aid, or a sterile non-stick gauze pad and micropore paper tape      REPEAT THESE INSTRUCTIONS AT LEAST ONCE A DAY UNTIL THE WOUND HAS COMPLETELY HEALED.    It is an old wives tale that a wound heals  better when it is exposed to air and allowed to dry out. The wound will heal faster with a better cosmetic result if it is kept moist with ointment and covered with a bandage.    **Do not let the wound dry out.**      Supplies Needed:      *Cotton tipped applicators (Q-tips)    *Polysporin Ointment or Bacitracin Ointment (NOT NEOSPORIN)    *Band-aids or non-stick gauze pads and micropore paper tape.      PATIENT INFORMATION:    During the healing process you will notice a number of changes. All wounds develop a small halo of redness surrounding the wound.  This means healing is occurring. Severe itching with extensive redness usually indicates sensitivity to the ointment or bandage tape used to dress the wound.  You should call our office if this develops.      Swelling  and/or discoloration around your surgical site is common, particularly when performed around the eye.    All wounds normally drain.  The larger the wound the more drainage there will be.  After 7-10 days, you will notice the wound beginning to shrink and new skin will begin to grow.  The wound is healed when you can see skin has formed over the entire area.  A healed wound has a healthy, shiny look to the surface and is red to dark pink in color to normalize.  Wounds may take approximately 4-6 weeks to heal.  Larger wounds may take 6-8 weeks.  After the wound is healed you may discontinue dressing changes.    You may experience a sensation of tightness as your wound heals. This is normal and will gradually subside.    Your healed wound may be sensitive to temperature changes. This sensitivity improves with time, but if you re having a lot of discomfort, try to avoid temperature extremes.    Patients frequently experience itching after their wound appears to have healed because of the continue healing under the skin.  Plain Vaseline will help relieve the itching.        POSSIBLE COMPLICATIONS    BLEEDIN. Leave the bandage in place.  2. Use  tightly rolled up gauze or a cloth to apply direct pressure over the bandage for 30  minutes.  3. Reapply pressure for an additional 30 minutes if necessary  4. Use additional gauze and tape to maintain pressure once the bleeding has stopped.          Follow-ups after your visit        Your next 10 appointments already scheduled     Mar 21, 2017 10:15 AM CDT   New Visit with Carli Thurman PA-C   [facility]    [address] [address] [phone]              Who to contact     If you have questions or need follow up information about today's clinic visit or your schedule please contact [facility] directly at [phone].  Normal or non-critical lab and imaging results will be communicated to you by MyChart, letter or phone within 4 business days after the clinic has received the results. If you do not hear from us within 7 days, please contact the clinic through MyChart or phone. If you have a critical or abnormal lab result, we will notify you by phone as soon as possible.  Submit refill requests through [facility] or call your pharmacy and they will forward the refill request to us. Please allow 3 business days for your refill to be completed.          Additional Information About Your Visit        MyChart Information     [facility] gives you secure access to your electronic health record. If you see a primary care provider, you can also send messages to your care team and make appointments. If you have questions, please call your primary care clinic.  If you do not have a primary care provider, please call [phone] and they will assist you.        Care EveryWhere ID     This is your Care EveryWhere ID. This could be used by other organizations to access your [place] medical records  [id number]        Your Vitals Were     Pulse Pulse Oximetry                91 98%           Blood Pressure from  Last 3 Encounters:   03/21/17 130/83   03/12/17 131/54   03/09/17 132/81    Weight from Last 3 Encounters:   03/12/17 103.4 kg (228 lb)   03/07/17 103.4 kg (228 lb)   02/28/17 103.7 kg (228 lb 11.2 oz)              We Performed the Following     BIOPSY SKIN/SUBQ/MUC MEM, SINGLE LESION     Surgical pathology exam          Today's Medication Changes          These changes are accurate as of: 3/21/17 10:02 AM.  If you have any questions, ask your nurse or doctor.               Start taking these medicines.        Dose/Directions    augmented betamethasone dipropionate 0.05 % ointment   Commonly known as:  DIPROLENE-AF   Used for:  Acute dermatitis   Started by:  Carli Thurman PA-C        Apply to AA BID x 2-3 weeks then PRN only   Quantity:  45 g   Refills:  3            Where to get your medicines      These medications were [place] [facility] [place] [address], [place] [address]     Phone:  [phone]     augmented betamethasone dipropionate 0.05 % ointment                Primary Care Provider Office Phone # Fax #    Sandra Ramos [phone]       [facility] [address] [facility] [address]        Thank you!     Thank you for choosing [facility]  for your care. Our goal is always to provide you with excellent care. Hearing back from our patients is one way we can continue to improve our services. Please take a few minutes to complete the written survey that you may receive in the mail after your visit with us. Thank you!             Your Updated Medication List - Protect others around you: Learn how to safely use, store and throw away your medicines at www.disposemymeds.org.          This list is accurate as of: 3/21/17 10:02 AM.  Always use your most recent med list.                   Brand Name Dispense Instructions for use    augmented betamethasone dipropionate 0.05 % ointment     DIPROLENE-AF    45 g    Apply to AA BID x 2-3 weeks then PRN only       * diazepam 5 MG tablet    VALIUM    20 tablet    Take 1-2 tablets (5-10 mg) by mouth every 8 hours as needed for anxiety or sleep       * diazepam 5 MG tablet    VALIUM    30 tablet    Take 1 tablet (5 mg) by mouth every 6 hours as needed (anal spasms)       docusate sodium 100 MG capsule    COLACE    20 capsule    Take 1 capsule (100 mg) by mouth 2 times daily       ibuprofen 800 MG tablet    ADVIL/MOTRIN    20 tablet    Take 1 tablet (800 mg) by mouth every 8 hours as needed for moderate pain       methocarbamol 750 MG tablet    ROBAXIN    20 tablet    Take 1 tablet (750 mg) by mouth 4 times daily as needed for muscle spasms       methylPREDNISolone 4 MG tablet    MEDROL DOSEPAK    21 tablet    Follow package instructions       ondansetron 4 MG tablet    ZOFRAN    8 tablet    Take 1 tablet (4 mg) by mouth every 6 hours       * oxyCODONE-acetaminophen 5-325 MG per tablet    PERCOCET    15 tablet    Take 1-2 tablets by mouth every 4 hours as needed for pain       * oxyCODONE-acetaminophen 5-325 MG per tablet    PERCOCET    30 tablet    Take 1 tablet by mouth every 6 hours as needed for pain       polyethylene glycol powder    MIRALAX/GLYCOLAX    510 g    Take 17 g by mouth daily as needed for constipation       PRISTIQ PO      Take 100 mg by mouth daily       senna 8.6 MG tablet    SENOKOT    60 tablet    Take 1 tablet by mouth 2 times daily as needed for constipation       senna-docusate 8.6-50 MG per tablet    SENOKOT-S;PERICOLACE     Take 2 tablets by mouth daily as needed for constipation       SUMATRIPTAN SUCCINATE PO      Take 50 mg by mouth once as needed for migraine       TOPIRAMATE PO      Take 50 mg by mouth in the morning and 100 mg by mouth in the evening       VITAMIN D3 PO      Take 5,000 Units by mouth daily       * Notice:  This list has 4 medication(s) that are the same as other medications prescribed for you. Read the  directions carefully, and ask your doctor or other care provider to review them with you.

## 2017-03-21 NOTE — PATIENT INSTRUCTIONS
Gentle skin care instructions    Bathe every day - we recommend short showers or baths (5 minutes or less) with lukewarm water.  Use a gentle soap such as Dove for sensitive skin, Cetaphil, Vanicream or CeraVe   Wash the  dirty areas  only - this means armpits, groin, buttocks and feet.   After the bath or shower, within 2 minutes:   1. Pat dry with towel    2. If prescribed a topical prescription, apply this FIRST to any red/rashy/itchy areas - betamethasone ointment - apply this twice per day for 2-3 weeks, then only when needed    3. Apply a moisturizer to entire body, even where you just put the prescription medicine. We recommend CeraVe cream, Cetaphil cream or Vanicream. You will do this every single day. If you don t bathe every day we still recommend an application of body moisturizer.         Wound Care Instructions     FOR SUPERFICIAL WOUNDS     [facility] [phone]    [facility] [phone]                       AFTER 24 HOURS YOU SHOULD REMOVE THE BANDAGE AND BEGIN DAILY DRESSING CHANGES AS FOLLOWS:     1) Remove Dressing.     2) Clean and dry the area with tap water using a Q-tip or sterile gauze pad.     3) Apply Vaseline, Aquaphor, Polysporin ointment or Bacitracin ointment over entire wound.  Do NOT use Neosporin ointment.     4) Cover the wound with a band-aid, or a sterile non-stick gauze pad and micropore paper tape      REPEAT THESE INSTRUCTIONS AT LEAST ONCE A DAY UNTIL THE WOUND HAS COMPLETELY HEALED.    It is an old wives tale that a wound heals better when it is exposed to air and allowed to dry out. The wound will heal faster with a better cosmetic result if it is kept moist with ointment and covered with a bandage.    **Do not let the wound dry out.**      Supplies Needed:      *Cotton tipped applicators (Q-tips)    *Polysporin Ointment or Bacitracin Ointment (NOT NEOSPORIN)    *Band-aids or non-stick gauze pads and micropore paper tape.      PATIENT  INFORMATION:    During the healing process you will notice a number of changes. All wounds develop a small halo of redness surrounding the wound.  This means healing is occurring. Severe itching with extensive redness usually indicates sensitivity to the ointment or bandage tape used to dress the wound.  You should call our office if this develops.      Swelling  and/or discoloration around your surgical site is common, particularly when performed around the eye.    All wounds normally drain.  The larger the wound the more drainage there will be.  After 7-10 days, you will notice the wound beginning to shrink and new skin will begin to grow.  The wound is healed when you can see skin has formed over the entire area.  A healed wound has a healthy, shiny look to the surface and is red to dark pink in color to normalize.  Wounds may take approximately 4-6 weeks to heal.  Larger wounds may take 6-8 weeks.  After the wound is healed you may discontinue dressing changes.    You may experience a sensation of tightness as your wound heals. This is normal and will gradually subside.    Your healed wound may be sensitive to temperature changes. This sensitivity improves with time, but if you re having a lot of discomfort, try to avoid temperature extremes.    Patients frequently experience itching after their wound appears to have healed because of the continue healing under the skin.  Plain Vaseline will help relieve the itching.        POSSIBLE COMPLICATIONS    BLEEDIN. Leave the bandage in place.  2. Use tightly rolled up gauze or a cloth to apply direct pressure over the bandage for 30  minutes.  3. Reapply pressure for an additional 30 minutes if necessary  4. Use additional gauze and tape to maintain pressure once the bleeding has stopped.

## 2017-03-23 LAB — COPATH REPORT: NORMAL

## 2017-03-29 LAB — PHQ9 SCORE: 9

## 2017-04-01 ENCOUNTER — HOSPITAL ENCOUNTER (EMERGENCY)
Facility: CLINIC | Age: 27
Discharge: HOME | End: 2017-04-01
Attending: EMERGENCY MEDICINE | Admitting: EMERGENCY MEDICINE
Payer: MEDICARE

## 2017-04-01 ENCOUNTER — APPOINTMENT (OUTPATIENT)
Dept: GENERAL RADIOLOGY | Facility: CLINIC | Age: 27
End: 2017-04-01
Attending: EMERGENCY MEDICINE
Payer: MEDICARE

## 2017-04-01 VITALS
SYSTOLIC BLOOD PRESSURE: 128 MMHG | OXYGEN SATURATION: 99 % | DIASTOLIC BLOOD PRESSURE: 88 MMHG | WEIGHT: 228 LBS | HEART RATE: 72 BPM | RESPIRATION RATE: 18 BRPM | TEMPERATURE: 98.9 F | BODY MASS INDEX: 41.96 KG/M2 | HEIGHT: 62 IN

## 2017-04-01 DIAGNOSIS — J11.1 INFLUENZA-LIKE ILLNESS: ICD-10-CM

## 2017-04-01 LAB
DEPRECATED S PYO AG THROAT QL EIA: NORMAL
FLUAV+FLUBV AG SPEC QL: NEGATIVE
FLUAV+FLUBV AG SPEC QL: NORMAL
MICRO REPORT STATUS: NORMAL
SPECIMEN SOURCE: NORMAL
SPECIMEN SOURCE: NORMAL

## 2017-04-01 PROCEDURE — 71020 XR CHEST 2 VW: CPT

## 2017-04-01 PROCEDURE — 87804 INFLUENZA ASSAY W/OPTIC: CPT | Performed by: EMERGENCY MEDICINE

## 2017-04-01 PROCEDURE — 87880 STREP A ASSAY W/OPTIC: CPT | Performed by: EMERGENCY MEDICINE

## 2017-04-01 PROCEDURE — 99284 EMERGENCY DEPT VISIT MOD MDM: CPT | Mod: 25

## 2017-04-01 PROCEDURE — 87081 CULTURE SCREEN ONLY: CPT | Performed by: EMERGENCY MEDICINE

## 2017-04-01 PROCEDURE — 25000132 ZZH RX MED GY IP 250 OP 250 PS 637: Performed by: EMERGENCY MEDICINE

## 2017-04-01 RX ORDER — IBUPROFEN 600 MG/1
600 TABLET, FILM COATED ORAL ONCE
Status: COMPLETED | OUTPATIENT
Start: 2017-04-01 | End: 2017-04-01

## 2017-04-01 RX ORDER — CODEINE PHOSPHATE AND GUAIFENESIN 10; 100 MG/5ML; MG/5ML
1 SOLUTION ORAL EVERY 4 HOURS PRN
Qty: 120 ML | Refills: 0 | Status: ON HOLD | OUTPATIENT
Start: 2017-04-01 | End: 2017-04-21

## 2017-04-01 RX ADMIN — IBUPROFEN 600 MG: 600 TABLET, FILM COATED ORAL at 01:34

## 2017-04-01 ASSESSMENT — ENCOUNTER SYMPTOMS
COUGH: 1
RHINORRHEA: 1
FEVER: 0
SORE THROAT: 1

## 2017-04-01 NOTE — ED PROVIDER NOTES
"  History     Chief Complaint:  Generalized Body Aches and Cough      HPI   Nevin Alvarado is a 26 year old female who presents with generalized body aches. The patient has had a body ache for the past week. The patient has a productive cough with sore throat. The patient also has runny nose. The patient states that she was taking Tylenol 1,000 mg and ibuprofen 600 mg as well as cough drops with no relief. The patient denies fevers.    Allergies:  Augmentin  Blood-Group specific substance  Hydrocodone  Influenza vaccines  Tamiflu  Vicodin  Cephalosporins     Medications:    Ibuprofen  Senna  Miralax  Sumatriptan  Topiramate  Desvenalfaxine succinate  Cholecalciferol  Augmented betamethasone dipropionate ointment     Past Medical History:    ADD (attention deficit disorder)   Anorexia nervosa with bulimia   Anxiety   Borderline personality disorder   Depression   H/O self-harm   H/O sigmoidoscopy   H/O swallowed foreign body   History of laparotomy   Lives in independent group home   Migraine without aura   Morbid obesity  PTSD (post-traumatic stress disorder)  Rectal foreign body    Past Surgical History:    EGD  Exam under anesthesia anus  Remove fecal impaction/ foreign body  Knee surgery  Laparoscopic ablation endometriosis  Laparotomy exploratory  Mammoplasty reduction  Release carpal tunnel  Sigmoidoscopy flexible    Family History:    Father: Cerebrovascular disease    Social History:  Marital Status: Single  Presents to the ED alone  PCP: Sandra Ramos     Review of Systems   Constitutional: Negative for fever.   HENT: Positive for rhinorrhea and sore throat.    Respiratory: Positive for cough.    Musculoskeletal:        Positive for generalized body aches.   All other systems reviewed and are negative.    Physical Exam   First Vitals:  BP: (!) 145/99  Pulse: 74  Temp: 98.9  F (37.2  C)  Resp: 18  Height: 157.5 cm (5' 2\")  Weight: 103.4 kg (228 lb)  SpO2: 97 %    Physical Exam  Nursing note and " vitals reviewed.  Constitutional: Cooperative.   HENT:   Mouth/Throat: Moist mucous membranes.   Eyes: EOMI, nonicteric sclera  Cardiovascular: Normal rate, regular rhythm, no murmurs, rubs, or gallops  Pulmonary/Chest: Effort normal and breath sounds normal. No respiratory distress. No wheezes. No rales.   Abdominal: Soft. Nontender, nondistended, no guarding or rigidity. BS present.   Musculoskeletal: Normal range of motion.   Neurological: Alert. Moves all extremities spontaneously.   Skin: Skin is warm and dry. No rash noted.   Psychiatric: Normal mood and affect.       Emergency Department Course   Imaging:  Radiographic findings were communicated with the patient who voiced understanding of the findings.      Chest XR,  PA & LAT   Final Result   IMPRESSION: No evidence of active cardiopulmonary disease      FRANCES JAEGER MD          All Readings Per Radiology Unless Otherwise Noted.    Laboratory:  Rapid strep test is negative.  Influenza A/B Antigen: Influenza A negative, Influenza B negative  Beta strep group A culture: pending    Interventions:  (0134) Ibuprofen, 600 mg, PO    ED Course:  Nursing notes and past medical history reviewed.   I performed a physical examination of the patient as documented above.  I explained the plan with the patient who consents to this.   The patient was sent for a chest x-ray while in the emergency department, findings above.  I personally reviewed the laboratory results with the patient and answered all related questions prior to discharge.   Findings and plan explained to the patient. Patient discharged home with instructions regarding supportive care, medications, and reasons to return. The importance of close follow-up was reviewed. The patient was prescribed Robitussin AC.     Impression & Plan    Medical Decision Making:  Nevin Alvarado is a 26 year old female who presents for evaluation of cough, fever and myalgias.  This is consistent with an influenza like  illness. Patient understands the sensitivity of influenza rapid test. I do suspect influenza, but PCR not indicated for confirmation at this time. They are at risk for pneumonia but no signs of this are detected on today's visit.  Close followup of primary care physician is indicated and return to the ED for high fevers > 103 for more than 48 hours more, increasing productive cough, shortness of breath, or confusion.  There is no signs of serious bacterial infection such as bacteremia, meningitis, UTI/pyelonephritis, strep pharyngitis, etc.    Diagnosis:    ICD-10-CM    1. Influenza-like illness R69        Disposition:   Discharge to home.     Discharge Medications:   Discharge Medication List as of 4/1/2017  4:10 AM      START taking these medications    Details   guaiFENesin-codeine (ROBITUSSIN AC) 100-10 MG/5ML SOLN solution Take 5 mLs by mouth every 4 hours as needed for cough, Disp-120 mL, R-0, Local Print               Ramon MURRAY, am serving as a scribe on 4/1/2017 at 3:04 AM to personally document services performed by Ilya David MD, based on my observations and the provider's statements to me.       Ilya David MD  04/01/17 0912

## 2017-04-01 NOTE — ED NOTES
For past week fever cough body aches   Sore throat  Getting worse now hurts to take deep breath   At time productive cough  Stuffy nose   Taking tylenol and ibuprofen  Abc intact

## 2017-04-01 NOTE — ED AVS SNAPSHOT
[facility] Emergency Department    [address] [facility] [address]    Phone:  [phone]    Fax:  [phone]                                       Nevin Alvarado   MRN: [id number]    Department:  [facility] Emergency Department   Date of Visit:  4/1/2017           Patient Information     Date Of Birth          1990        Your diagnoses for this visit were:     Influenza-like illness        You were seen by Ilya David MD.      Follow-up Information     Follow up with Sandra Ramos MD. Schedule an appointment as soon as possible for a visit in 3 days.    Specialty:  Internal Medicine    Contact information:    [facility]  [address] [facility] [address]  [phone]          Follow up with [facility] Emergency Department.    Specialty:  EMERGENCY MEDICINE    Why:  As needed, If symptoms worsen    Contact information:    [address] [facility] [address] [phone]        Discharge Instructions         Viral Upper Respiratory Illness (Adult)  You have a viral upper respiratory illness (URI), which is another term for the common cold. This illness is contagious during the first few days. It is spread through the air by coughing and sneezing. It may also be spread by direct contact (touching the sick person and then touching your own eyes, nose, or mouth). Frequent handwashing will decrease risk of spread. Most viral illnesses go away within 7 to 10 days with rest and simple home remedies. Sometimes the illness may last for several weeks. Antibiotics will not kill a virus, and they are generally not prescribed for this condition.    Home care    If symptoms are severe, rest at home for the first 2 to 3 days. When you resume activity, don't let yourself get too tired.    Avoid being exposed to cigarette smoke (yours or others ).    You may use acetaminophen or ibuprofen to control pain  and fever, unless another medicine was prescribed. (Note: If you have chronic liver or kidney disease, have ever had a stomach ulcer or gastrointestinal bleeding, or are taking blood-thinning medicines, talk with your healthcare provider before using these medicines.) Aspirin should never be given to anyone under 18 years of age who is ill with a viral infection or fever. It may cause severe liver or brain damage.    Your appetite may be poor, so a light diet is fine. Avoid dehydration by drinking 6 to 8 glasses of fluids per day (water, soft drinks, juices, tea, or soup). Extra fluids will help loosen secretions in the nose and lungs.    Over-the-counter cold medicines will not shorten the length of time you re sick, but they may be helpful for the following symptoms: cough, sore throat, and nasal and sinus congestion. (Note: Do not use decongestants if you have high blood pressure.)  Follow-up care  Follow up with your healthcare provider, or as advised.  When to seek medical advice  Call your healthcare provider right away if any of these occur:    Cough with lots of colored sputum (mucus)    Severe headache; face, neck, or ear pain    Difficulty swallowing due to throat pain    Fever of 100.4 F (38 C)  Call 911, or get immediate medical care  Call emergency services right away if any of these occur:    Chest pain, shortness of breath, wheezing, or difficulty breathing    Coughing up blood    Inability to swallow due to throat pain    4398-0620 [employer]. [address] [place] [address]. All rights reserved. This information is not intended as a substitute for professional medical care. Always follow your healthcare professional's instructions.          Future Appointments        Provider Department Dept Phone Center    4/18/2017 9:45 AM Carli Chiang PA-C [facility] [phone]       24 Hour Appointment Hotline       To make an appointment at any  [facility], call [phone] ([phone]). If you don't have a family doctor or clinic, we will help you find one. [facility] are conveniently located to serve the needs of you and your family.             Review of your medicines      START taking        Dose / Directions Last dose taken    guaiFENesin-codeine 100-10 MG/5ML Soln solution   Commonly known as:  ROBITUSSIN AC   Dose:  1 tsp.   Quantity:  120 mL        Take 5 mLs by mouth every 4 hours as needed for cough   Refills:  0          Our records show that you are taking the medicines listed below. If these are incorrect, please call your family doctor or clinic.        Dose / Directions Last dose taken    augmented betamethasone dipropionate 0.05 % ointment   Commonly known as:  DIPROLENE-AF   Quantity:  45 g        Apply to AA BID x 2-3 weeks then PRN only   Refills:  3        ibuprofen 800 MG tablet   Commonly known as:  ADVIL/MOTRIN   Dose:  800 mg   Quantity:  20 tablet        Take 1 tablet (800 mg) by mouth every 8 hours as needed for moderate pain   Refills:  0        polyethylene glycol powder   Commonly known as:  MIRALAX/GLYCOLAX   Dose:  17 g   Quantity:  510 g        Take 17 g by mouth daily as needed for constipation   Refills:  3        PRISTIQ PO   Dose:  100 mg        Take 100 mg by mouth daily   Refills:  0        senna 8.6 MG tablet   Commonly known as:  SENOKOT   Dose:  1 tablet   Quantity:  60 tablet        Take 1 tablet by mouth 2 times daily as needed for constipation   Refills:  0        SUMATRIPTAN SUCCINATE PO   Dose:  50 mg        Take 50 mg by mouth once as needed for migraine   Refills:  0        TOPIRAMATE PO        Take 50 mg by mouth in the morning and 100 mg by mouth in the evening   Refills:  0        VITAMIN D3 PO   Dose:  5000 Units        Take 5,000 Units by mouth daily   Refills:  0                Prescriptions were sent or printed at these locations (1 Prescription)                   Other Prescriptions                 Printed at Department/Unit printer (1 of 1)         guaiFENesin-codeine (ROBITUSSIN AC) 100-10 MG/5ML SOLN solution                Procedures and tests performed during your visit     Beta strep group A culture    Chest XR,  PA & LAT    Influenza A/B antigen    Rapid strep screen      Orders Needing Specimen Collection     None      Pending Results     Date and Time Order Name Status Description    4/1/2017 0135 Beta strep group A culture In process             Pending Culture Results     Date and Time Order Name Status Description    4/1/2017 0135 Beta strep group A culture In process              Test Results from your hospital stay     4/1/2017  2:27 AM - Interface, Flexilab Results      Component Results     Component    Specimen Description    Throat    Rapid Strep A Screen    NEGATIVE: No Group A streptococcal antigen detected by immunoassay, await   culture report.      Micro Report Status    FINAL 04/01/2017 4/1/2017  2:37 AM - Interface, Flexilab Results      Component Results     Component Value Ref Range & Units Status    Influenza A/B Agn Specimen Nasal  Final    Influenza A Negative NEG Final    Influenza B  NEG Final    Negative   Test results must be correlated with clinical data. If necessary, results   should be confirmed by a molecular assay or viral culture.           4/1/2017  2:28 AM - Interface, Flexilab Results         4/1/2017  3:53 AM - Interface, Radiant Ib      Narrative     CHEST 2 VIEWS  4/1/2017 3:34 AM     HISTORY: Cough. Left lower lobe bronchi.    COMPARISON: 3/9/2017.    FINDINGS: The lungs are clear. Normal-sized cardiac silhouette.        Impression     IMPRESSION: No evidence of active cardiopulmonary disease    FRANCES JAEGER MD                Clinical Quality Measure: Blood Pressure Screening     Your blood pressure was checked while you were in the emergency department today. The last reading we obtained was  BP: (!) 145/99 . Please read the guidelines  below about what these numbers mean and what you should do about them.  If your systolic blood pressure (the top number) is less than 120 and your diastolic blood pressure (the bottom number) is less than 80, then your blood pressure is normal. There is nothing more that you need to do about it.  If your systolic blood pressure (the top number) is 120-139 or your diastolic blood pressure (the bottom number) is 80-89, your blood pressure may be higher than it should be. You should have your blood pressure rechecked within a year by a primary care provider.  If your systolic blood pressure (the top number) is 140 or greater or your diastolic blood pressure (the bottom number) is 90 or greater, you may have high blood pressure. High blood pressure is treatable, but if left untreated over time it can put you at risk for heart attack, stroke, or kidney failure. You should have your blood pressure rechecked by a primary care provider within the next 4 weeks.  If your provider in the emergency department today gave you specific instructions to follow-up with your doctor or provider even sooner than that, you should follow that instruction and not wait for up to 4 weeks for your follow-up visit.        Thank you for choosing [facility]       Thank you for choosing [facility] for your care. Our goal is always to provide you with excellent care. Hearing back from our patients is one way we can continue to improve our services. Please take a few minutes to complete the written survey that you may receive in the mail after you visit with us. Thank you!        [facility] Information     [facility] gives you secure access to your electronic health record. If you see a primary care provider, you can also send messages to your care team and make appointments. If you have questions, please call your primary care clinic.  If you do not have a primary care provider, please call [phone] and they will assist you.        Care EveryWhere ID      This is your Care EveryWhere ID. This could be used by other organizations to access your [facility] medical records  [id number]        After Visit Summary       This is your record. Keep this with you and show to your community pharmacist(s) and doctor(s) at your next visit.

## 2017-04-01 NOTE — DISCHARGE INSTRUCTIONS
Viral Upper Respiratory Illness (Adult)  You have a viral upper respiratory illness (URI), which is another term for the common cold. This illness is contagious during the first few days. It is spread through the air by coughing and sneezing. It may also be spread by direct contact (touching the sick person and then touching your own eyes, nose, or mouth). Frequent handwashing will decrease risk of spread. Most viral illnesses go away within 7 to 10 days with rest and simple home remedies. Sometimes the illness may last for several weeks. Antibiotics will not kill a virus, and they are generally not prescribed for this condition.    Home care    If symptoms are severe, rest at home for the first 2 to 3 days. When you resume activity, don't let yourself get too tired.    Avoid being exposed to cigarette smoke (yours or others ).    You may use acetaminophen or ibuprofen to control pain and fever, unless another medicine was prescribed. (Note: If you have chronic liver or kidney disease, have ever had a stomach ulcer or gastrointestinal bleeding, or are taking blood-thinning medicines, talk with your healthcare provider before using these medicines.) Aspirin should never be given to anyone under 18 years of age who is ill with a viral infection or fever. It may cause severe liver or brain damage.    Your appetite may be poor, so a light diet is fine. Avoid dehydration by drinking 6 to 8 glasses of fluids per day (water, soft drinks, juices, tea, or soup). Extra fluids will help loosen secretions in the nose and lungs.    Over-the-counter cold medicines will not shorten the length of time you re sick, but they may be helpful for the following symptoms: cough, sore throat, and nasal and sinus congestion. (Note: Do not use decongestants if you have high blood pressure.)  Follow-up care  Follow up with your healthcare provider, or as advised.  When to seek medical advice  Call your healthcare provider right away if any  of these occur:    Cough with lots of colored sputum (mucus)    Severe headache; face, neck, or ear pain    Difficulty swallowing due to throat pain    Fever of 100.4 F (38 C)  Call 911, or get immediate medical care  Call emergency services right away if any of these occur:    Chest pain, shortness of breath, wheezing, or difficulty breathing    Coughing up blood    Inability to swallow due to throat pain    2933-9300 [employer]. [address] [place] [address], [place] [address]. All rights reserved. This information is not intended as a substitute for professional medical care. Always follow your healthcare professional's instructions.

## 2017-04-01 NOTE — ED AVS SNAPSHOT
[facility] Emergency Department    [address]    [facility] [address]    Phone:  [phone]    Fax:  [phone]                                       Nevin Alvarado   MRN: [id number]    Department:  [facility] Emergency Department   Date of Visit:  4/1/2017           After Visit Summary Signature Page     I have received my discharge instructions, and my questions have been answered. I have discussed any challenges I see with this plan with the nurse or doctor.    ..........................................................................................................................................  Patient/Patient Representative Signature      ..........................................................................................................................................  Patient Representative Print Name and Relationship to Patient    ..................................................               ................................................  Date                                            Time    ..........................................................................................................................................  Reviewed by Signature/Title    ...................................................              ..............................................  Date                                                            Time

## 2017-04-02 ENCOUNTER — HOSPITAL ENCOUNTER (EMERGENCY)
Facility: CLINIC | Age: 27
Discharge: HOME | End: 2017-04-02
Attending: EMERGENCY MEDICINE | Admitting: EMERGENCY MEDICINE
Payer: MEDICARE

## 2017-04-02 VITALS
SYSTOLIC BLOOD PRESSURE: 147 MMHG | WEIGHT: 228 LBS | DIASTOLIC BLOOD PRESSURE: 57 MMHG | RESPIRATION RATE: 22 BRPM | BODY MASS INDEX: 41.7 KG/M2 | HEART RATE: 101 BPM | OXYGEN SATURATION: 96 % | TEMPERATURE: 97.8 F

## 2017-04-02 DIAGNOSIS — J20.8 ACUTE VIRAL BRONCHITIS: ICD-10-CM

## 2017-04-02 PROCEDURE — 99282 EMERGENCY DEPT VISIT SF MDM: CPT

## 2017-04-02 RX ORDER — ALBUTEROL SULFATE 90 UG/1
2 AEROSOL, METERED RESPIRATORY (INHALATION) EVERY 4 HOURS PRN
Qty: 1 INHALER | Refills: 0 | Status: ON HOLD | OUTPATIENT
Start: 2017-04-02 | End: 2017-07-20

## 2017-04-02 ASSESSMENT — ENCOUNTER SYMPTOMS
SORE THROAT: 1
COUGH: 1
HEMATURIA: 0
DYSURIA: 0
MYALGIAS: 1
RHINORRHEA: 1
DIARRHEA: 0
FEVER: 0

## 2017-04-02 NOTE — ED NOTES
Discharge instructions gone over with pt, verbalized understanding. Discharged home with plan for follow up and prescription for inhaler. Denies questions at time of discharge.

## 2017-04-02 NOTE — DISCHARGE INSTRUCTIONS
Bronchitis, Viral (Adult)    You have a viral bronchitis. Bronchitis is inflammation and swelling of the lining of the lungs. This is often caused by an infection. Symptoms include a dry, hacking cough that is worse at night. The cough may bring up yellow-green mucus. You may also feel short of breath or wheeze. Other symptoms may include tiredness, chest discomfort, and chills.  Bronchitis that is caused by a virus is not treated with antibiotics. Instead, medicines may be given to help relieve symptoms. Symptoms can last up to 2 weeks, although the cough may last much longer.  This illness is contagious during the first few days and is spread through the air by coughing and sneezing, or by direct contact (touching the sick person and then touching your own eyes, nose, or mouth).  Most viral illnesses resolve within 10 to 14 days with rest and simple home remedies, although they may sometimes last for several weeks.  Home care    If symptoms are severe, rest at home for the first 2 to 3 days. When you go back to your usual activities, don't let yourself get too tired.    Do not smoke. Also avoid being exposed to secondhand smoke.    You may use over-the-counter medicine to control fever or pain, unless another pain medicine was prescribed. (Note: If you have chronic liver or kidney disease or have ever had a stomach ulcer or gastrointestinal bleeding, talk with your healthcare provider before using these medicines. Also talk to your provider if you are taking medicine to prevent blood clots.) Aspirin should never be given to anyone younger than 18 years of age who is ill with a viral infection or fever. It may cause severe liver or brain damage.    Your appetite may be poor, so a light diet is fine. Avoid dehydration by drinking 6 to 8 glasses of fluids per day (such as water, soft drinks, sports drinks, juices, tea, or soup). Extra fluids will help loosen secretions in the nose and  lungs.    Over-the-counter cough, cold, and sore-throat medicines will not shorten the length of the illness, but they may help to reduce symptoms. (Note: Do not use decongestants if you have high blood pressure.)  Follow-up care  Follow up with your healthcare provider, or as advised.  If you had an X-ray or ECG (electrocardiogram), a specialist will review it. You will be notified of any new findings that may affect your care.  Note: If you are age 65 or older, or if you have a chronic lung disease or condition that affects your immune system, or you smoke, talk to your healthcare provider about having pneumococcal vaccinations and a yearly influenza vaccination (flu shot).  When to seek medical advice   Call your healthcare provider right away if any of these occur:    Fever of 100.4 F (38 C) or higher    Coughing up increased amounts of colored sputum    Weakness, drowsiness, headache, facial pain, ear pain, or a stiff neck  Call 911, or get immediate medical care  Contact emergency services right away if any of these occur:    Coughing up blood    Worsening weakness, drowsiness, headache, or stiff neck    Trouble breathing, wheezing, or pain with breathing    7884-0681 [employer]. [address] [place] [address], [place] [address]. All rights reserved. This information is not intended as a substitute for professional medical care. Always follow your healthcare professional's instructions.

## 2017-04-02 NOTE — ED AVS SNAPSHOT
[facility] Emergency Department    [address] [place] [address]    Phone:  [phone]    Fax:  [phone]                                       Nevin Alvarado   MRN: [id number]    Department:  [facility] Emergency Department   Date of Visit:  4/2/2017           Patient Information     Date Of Birth          1990        Your diagnoses for this visit were:     Acute viral bronchitis        You were seen by Siddharth Soler MD.      Follow-up Information     Follow up with Sandra Ramos MD.    Specialty:  Internal Medicine    Why:  As needed    Contact information:    [facility]  [address] [facility] [address]  [phone]          Discharge Instructions         Bronchitis, Viral (Adult)    You have a viral bronchitis. Bronchitis is inflammation and swelling of the lining of the lungs. This is often caused by an infection. Symptoms include a dry, hacking cough that is worse at night. The cough may bring up yellow-green mucus. You may also feel short of breath or wheeze. Other symptoms may include tiredness, chest discomfort, and chills.  Bronchitis that is caused by a virus is not treated with antibiotics. Instead, medicines may be given to help relieve symptoms. Symptoms can last up to 2 weeks, although the cough may last much longer.  This illness is contagious during the first few days and is spread through the air by coughing and sneezing, or by direct contact (touching the sick person and then touching your own eyes, nose, or mouth).  Most viral illnesses resolve within 10 to 14 days with rest and simple home remedies, although they may sometimes last for several weeks.  Home care    If symptoms are severe, rest at home for the first 2 to 3 days. When you go back to your usual activities, don't let yourself get too tired.    Do not smoke. Also avoid being exposed to secondhand smoke.    You may use over-the-counter medicine  to control fever or pain, unless another pain medicine was prescribed. (Note: If you have chronic liver or kidney disease or have ever had a stomach ulcer or gastrointestinal bleeding, talk with your healthcare provider before using these medicines. Also talk to your provider if you are taking medicine to prevent blood clots.) Aspirin should never be given to anyone younger than 18 years of age who is ill with a viral infection or fever. It may cause severe liver or brain damage.    Your appetite may be poor, so a light diet is fine. Avoid dehydration by drinking 6 to 8 glasses of fluids per day (such as water, soft drinks, sports drinks, juices, tea, or soup). Extra fluids will help loosen secretions in the nose and lungs.    Over-the-counter cough, cold, and sore-throat medicines will not shorten the length of the illness, but they may help to reduce symptoms. (Note: Do not use decongestants if you have high blood pressure.)  Follow-up care  Follow up with your healthcare provider, or as advised.  If you had an X-ray or ECG (electrocardiogram), a specialist will review it. You will be notified of any new findings that may affect your care.  Note: If you are age 65 or older, or if you have a chronic lung disease or condition that affects your immune system, or you smoke, talk to your healthcare provider about having pneumococcal vaccinations and a yearly influenza vaccination (flu shot).  When to seek medical advice   Call your healthcare provider right away if any of these occur:    Fever of 100.4 F (38 C) or higher    Coughing up increased amounts of colored sputum    Weakness, drowsiness, headache, facial pain, ear pain, or a stiff neck  Call 911, or get immediate medical care  Contact emergency services right away if any of these occur:    Coughing up blood    Worsening weakness, drowsiness, headache, or stiff neck    Trouble breathing, wheezing, or pain with breathing    8734-9178 [employer]  [address] [place] [address]. All rights reserved. This information is not intended as a substitute for professional medical care. Always follow your healthcare professional's instructions.          Future Appointments        Provider Department Dept Phone Center    4/18/2017 9:45 AM Carli Chiang PA-C [facility] [phone]       24 Hour Appointment Hotline       To make an appointment at any [facility], call [phone] ([phone]). If you don't have a family doctor or clinic, we will help you find one. [facility] are conveniently located to serve the needs of you and your family.             Review of your medicines      Our records show that you are taking the medicines listed below. If these are incorrect, please call your family doctor or clinic.        Dose / Directions Last dose taken    augmented betamethasone dipropionate 0.05 % ointment   Commonly known as:  DIPROLENE-AF   Quantity:  45 g        Apply to AA BID x 2-3 weeks then PRN only   Refills:  3        guaiFENesin-codeine 100-10 MG/5ML Soln solution   Commonly known as:  ROBITUSSIN AC   Dose:  1 tsp.   Quantity:  120 mL        Take 5 mLs by mouth every 4 hours as needed for cough   Refills:  0        ibuprofen 800 MG tablet   Commonly known as:  ADVIL/MOTRIN   Dose:  800 mg   Quantity:  20 tablet        Take 1 tablet (800 mg) by mouth every 8 hours as needed for moderate pain   Refills:  0        polyethylene glycol powder   Commonly known as:  MIRALAX/GLYCOLAX   Dose:  17 g   Quantity:  510 g        Take 17 g by mouth daily as needed for constipation   Refills:  3        PRISTIQ PO   Dose:  100 mg        Take 100 mg by mouth daily   Refills:  0        senna 8.6 MG tablet   Commonly known as:  SENOKOT   Dose:  1 tablet   Quantity:  60 tablet        Take 1 tablet by mouth 2 times daily as needed for constipation   Refills:  0        SUMATRIPTAN SUCCINATE PO   Dose:  50 mg        Take 50  mg by mouth once as needed for migraine   Refills:  0        TOPIRAMATE PO        Take 50 mg by mouth in the morning and 100 mg by mouth in the evening   Refills:  0        VITAMIN D3 PO   Dose:  5000 Units        Take 5,000 Units by mouth daily   Refills:  0                Orders Needing Specimen Collection     None      Pending Results     Date and Time Order Name Status Description    4/1/2017 0135 Beta strep group A culture In process             Pending Culture Results     Date and Time Order Name Status Description    4/1/2017 0135 Beta strep group A culture In process              Test Results from your hospital stay            Clinical Quality Measure: Blood Pressure Screening     Your blood pressure was checked while you were in the emergency department today. The last reading we obtained was  BP: 147/57 . Please read the guidelines below about what these numbers mean and what you should do about them.  If your systolic blood pressure (the top number) is less than 120 and your diastolic blood pressure (the bottom number) is less than 80, then your blood pressure is normal. There is nothing more that you need to do about it.  If your systolic blood pressure (the top number) is 120-139 or your diastolic blood pressure (the bottom number) is 80-89, your blood pressure may be higher than it should be. You should have your blood pressure rechecked within a year by a primary care provider.  If your systolic blood pressure (the top number) is 140 or greater or your diastolic blood pressure (the bottom number) is 90 or greater, you may have high blood pressure. High blood pressure is treatable, but if left untreated over time it can put you at risk for heart attack, stroke, or kidney failure. You should have your blood pressure rechecked by a primary care provider within the next 4 weeks.  If your provider in the emergency department today gave you specific instructions to follow-up with your doctor or provider  even sooner than that, you should follow that instruction and not wait for up to 4 weeks for your follow-up visit.        Thank you for choosing [facility]       Thank you for choosing [facility] for your care. Our goal is always to provide you with excellent care. Hearing back from our patients is one way we can continue to improve our services. Please take a few minutes to complete the written survey that you may receive in the mail after you visit with us. Thank you!        [facility] Information     [facility] gives you secure access to your electronic health record. If you see a primary care provider, you can also send messages to your care team and make appointments. If you have questions, please call your primary care clinic.  If you do not have a primary care provider, please call [phone] and they will assist you.        Care EveryWhere ID     This is your Care EveryWhere ID. This could be used by other organizations to access your [facility] medical records  [id number]        After Visit Summary       This is your record. Keep this with you and show to your community pharmacist(s) and doctor(s) at your next visit.

## 2017-04-02 NOTE — ED AVS SNAPSHOT
[facility] Emergency Department    [address]    [facility] [address]    Phone:  [phone]    Fax:  [phone]                                       Nevin Alvarado   MRN: [id number]    Department:  [facility] Emergency Department   Date of Visit:  4/2/2017           After Visit Summary Signature Page     I have received my discharge instructions, and my questions have been answered. I have discussed any challenges I see with this plan with the nurse or doctor.    ..........................................................................................................................................  Patient/Patient Representative Signature      ..........................................................................................................................................  Patient Representative Print Name and Relationship to Patient    ..................................................               ................................................  Date                                            Time    ..........................................................................................................................................  Reviewed by Signature/Title    ...................................................              ..............................................  Date                                                            Time

## 2017-04-02 NOTE — PROGRESS NOTES
CM: VIJAYA paged to kurtis of [facility] for pt that was dc from the ED early Sunday morning. Pt was tearful and frustrated that she did not have a ride home.. PT did call [employer] on her own at 4:00 am.  Was told it would take 30 min- to 3 hours to locate a ride home.. When the provider called back at 7:00 am pt was told that they could not find any venders to provide ride.  House RN supervisor gave pt one bus token. Pt was called the bus company and was told that they do not provide service outside the main hospital on the weekend.      SWS called [employer] was told it would take another 30 min to 3 hours to try and find transport home for pt if they were able to connect with a veder.  At 9:45 AM sws had still not head from [facility].  SWS called cab and provided ride home billed back to [facility].  Pt had been at [facility] since before 5am and had not eaten anything since 7pm the  Night before.    PT lives in a [facility] support housing unit through [employer].     P: Provided meal ticket for breakfast and Cab ride set up.

## 2017-04-02 NOTE — ED NOTES
26-year-old female presents to the ER by EMS for complaints of a cough. Pt was seen here last night for the same thing. Was told she had a virus and she states it is getting worse. Pt is tearful during assessment. Lung sounds are clear. Sats 96% RA. States she is feeling dizzy from coughing. Occasional dry cough noted on arrival to ER room.

## 2017-04-02 NOTE — ED PROVIDER NOTES
History     Chief Complaint:  Cough    HPI   Nevin Alvarado is a 26 year old female who presents by EMS to the emergency department today for evaluation of a persistent cough that began 5 days ago. She reports concurrent body aches, runny nose, and sore throat. Patient also describes chest pain that is only had with particularly bad bouts of cough. She reports poor relief with cough medications given after ED evaluation yesterday that had unremarkable workup including imaging, as noted below. She also reports poor relief with symptomatic care at home. She denies fever, urinary symptoms, or bowel movement symptoms. No other concerns were voiced at this time.     Imaging and lab results from ED evaluation on 04/01/2017:  Chest xray PA & LAT:  No evidence of active cardiopulmonary disease    Rapid strep test is negative.  Beta strep group A culture: pending    Influenza A/B Antigen: Influenza A negative, Influenza B negative    Allergies:  Augmentin  Blood-Group specific substance  Hydrocodone  Influenza vaccines  Tamiflu  Vicodin  Cephalosporins      Medications:    Senna  Miralax  Sumatriptan  Topiramate  Desvenalfaxine succinate  Cholecalciferol  Augmented betamethasone dipropionate ointment      Past Medical History:    ADD (attention deficit disorder)   Anorexia nervosa with bulimia   Anxiety   Borderline personality disorder   Depression   H/O self-harm   H/O swallowed foreign body - recurrent  Migraine without aura   Morbid obesity  PTSD (post-traumatic stress disorder)  Rectal foreign body - recurrent      Past Surgical History:   EGD  Exam under anesthesia anus  Remove fecal impaction/ foreign body  Knee surgery  Laparoscopic ablation endometriosis  Laparotomy exploratory  Mammoplasty reduction  Release carpal tunnel  Sigmoidoscopy flexible     Family History:   Father: Cerebrovascular disease     Social History:  Marital Status: Single  Presents to the ED alone  PCP: Sandra Ramos     Review of  Systems   Constitutional: Negative for fever.   HENT: Positive for rhinorrhea and sore throat.    Respiratory: Positive for cough.    Gastrointestinal: Negative for diarrhea.   Genitourinary: Negative for dysuria and hematuria.   Musculoskeletal: Positive for myalgias.   All other systems reviewed and are negative.    Physical Exam   First Vitals:  BP: 147/57  Pulse: 101  Temp: 97.8  F (36.6  C)  Resp: 22  Weight: 103.4 kg (228 lb)  SpO2: 96 %      Physical Exam  Nursing note and vitals reviewed.  Constitutional: Cooperative. Tearful and occasional dry cough.   HENT:   Mouth/Throat: Mucous membranes are normal.   Cardiovascular: Normal rate, regular rhythm and normal heart sounds.  No murmur.  Pulmonary/Chest: Effort normal and breath sounds normal. No respiratory distress. No wheezes. No rales.   Abdominal: Soft. Normal appearance. There is no tenderness.    Neurological: Alert.   Skin: Skin is warm and dry. No rash noted.   Psychiatric: Normal mood and affect.     Emergency Department Course     Emergency Department Course:  Nursing notes and vitals reviewed.  I performed an exam of the patient as documented above.   At 0530 the patient was evaluated and I discussed plan of care with patient.      I personally reviewed the treatment plan with the Patient and answered all related questions prior to discharge.    Impression & Plan      Medical Decision Making:  Nevin Alvarado is a 26 year old female who presents to the ED by ambulance with continued cough. Reviewed her workup from yesterday, showing a normal chest xray, strep, and influenza swabs. She has had symptoms of cough and body aches for 5 days. This likely represents a viral bronchitis. No indication for Tamiflu administration.  No concern for pneumothorax. This unlikely represents pulmonary embolism or any intraabdominal pathology. Supportive care is still recommended. I did write for an Albuterol inhaler, but would hold on any further work up,  antibiotics, or cough suppressants at this time.     Diagnosis:    ICD-10-CM    1. Acute viral bronchitis J20.8      Disposition:   Discharge home; plan for follow up with Sandra Ramos    Discharge Medications:  New Prescriptions    ALBUTEROL (ALBUTEROL) 108 (90 BASE) MCG/ACT INHALER    Inhale 2 puffs into the lungs every 4 hours as needed for shortness of breath / dyspnea       Scribe Disclosure:  I, Parag Barber, am serving as a scribe at 5:16 AM on 4/2/2017 to document services personally performed by Siddharth Soler MD, based on my observations and the provider's statements to me.  [facility] EMERGENCY DEPARTMENT       Siddharth Soler MD  04/02/17 0640

## 2017-04-03 LAB
BACTERIA SPEC CULT: NORMAL
MICRO REPORT STATUS: NORMAL
SPECIMEN SOURCE: NORMAL

## 2017-04-11 ENCOUNTER — TRANSFERRED RECORDS (OUTPATIENT)
Dept: HEALTH INFORMATION MANAGEMENT | Facility: CLINIC | Age: 27
End: 2017-04-11

## 2017-04-14 ENCOUNTER — HOSPITAL ENCOUNTER (EMERGENCY)
Facility: CLINIC | Age: 27
Discharge: HOME | End: 2017-04-15
Attending: EMERGENCY MEDICINE | Admitting: EMERGENCY MEDICINE
Payer: MEDICARE

## 2017-04-14 DIAGNOSIS — R10.2 PELVIC PAIN IN FEMALE: ICD-10-CM

## 2017-04-14 LAB
ALBUMIN UR-MCNC: NEGATIVE MG/DL
APPEARANCE UR: ABNORMAL
BACTERIA #/AREA URNS HPF: ABNORMAL /HPF
BILIRUB UR QL STRIP: NEGATIVE
COLOR UR AUTO: YELLOW
GLUCOSE UR STRIP-MCNC: NEGATIVE MG/DL
HCG UR QL: NEGATIVE
HGB UR QL STRIP: NEGATIVE
KETONES UR STRIP-MCNC: NEGATIVE MG/DL
LEUKOCYTE ESTERASE UR QL STRIP: NEGATIVE
MUCOUS THREADS #/AREA URNS LPF: PRESENT /LPF
NITRATE UR QL: NEGATIVE
PH UR STRIP: 5 PH (ref 5–7)
RBC #/AREA URNS AUTO: 1 /HPF (ref 0–2)
SP GR UR STRIP: 1.02 (ref 1–1.03)
SQUAMOUS #/AREA URNS AUTO: 4 /HPF (ref 0–1)
URN SPEC COLLECT METH UR: ABNORMAL
UROBILINOGEN UR STRIP-MCNC: 0 MG/DL (ref 0–2)
WBC #/AREA URNS AUTO: 2 /HPF (ref 0–2)

## 2017-04-14 PROCEDURE — 99284 EMERGENCY DEPT VISIT MOD MDM: CPT | Mod: 25

## 2017-04-14 PROCEDURE — 25000132 ZZH RX MED GY IP 250 OP 250 PS 637: Mod: GY | Performed by: EMERGENCY MEDICINE

## 2017-04-14 PROCEDURE — 87491 CHLMYD TRACH DNA AMP PROBE: CPT | Performed by: EMERGENCY MEDICINE

## 2017-04-14 PROCEDURE — 81001 URINALYSIS AUTO W/SCOPE: CPT | Performed by: EMERGENCY MEDICINE

## 2017-04-14 PROCEDURE — 25000125 ZZHC RX 250: Performed by: EMERGENCY MEDICINE

## 2017-04-14 PROCEDURE — A9270 NON-COVERED ITEM OR SERVICE: HCPCS | Mod: GY | Performed by: EMERGENCY MEDICINE

## 2017-04-14 PROCEDURE — 87210 SMEAR WET MOUNT SALINE/INK: CPT | Performed by: EMERGENCY MEDICINE

## 2017-04-14 PROCEDURE — 81025 URINE PREGNANCY TEST: CPT | Performed by: EMERGENCY MEDICINE

## 2017-04-14 PROCEDURE — 87591 N.GONORRHOEAE DNA AMP PROB: CPT | Performed by: EMERGENCY MEDICINE

## 2017-04-14 RX ORDER — IBUPROFEN 600 MG/1
600 TABLET, FILM COATED ORAL ONCE
Status: COMPLETED | OUTPATIENT
Start: 2017-04-14 | End: 2017-04-14

## 2017-04-14 RX ORDER — ONDANSETRON 4 MG/1
4 TABLET, ORALLY DISINTEGRATING ORAL ONCE
Status: COMPLETED | OUTPATIENT
Start: 2017-04-14 | End: 2017-04-14

## 2017-04-14 RX ORDER — OXYCODONE HYDROCHLORIDE 5 MG/1
5 TABLET ORAL ONCE
Status: COMPLETED | OUTPATIENT
Start: 2017-04-14 | End: 2017-04-14

## 2017-04-14 RX ADMIN — OXYCODONE HYDROCHLORIDE 5 MG: 5 TABLET ORAL at 23:13

## 2017-04-14 RX ADMIN — ONDANSETRON 4 MG: 4 TABLET, ORALLY DISINTEGRATING ORAL at 23:13

## 2017-04-14 RX ADMIN — IBUPROFEN 600 MG: 600 TABLET ORAL at 23:13

## 2017-04-14 NOTE — ED AVS SNAPSHOT
[facility] Emergency Department    [address]    [facility] [address]    Phone:  [phone]    Fax:  [phone]                                       Nevin Alvarado   MRN: [id number]    Department:  [facility] Emergency Department   Date of Visit:  4/14/2017           After Visit Summary Signature Page     I have received my discharge instructions, and my questions have been answered. I have discussed any challenges I see with this plan with the nurse or doctor.    ..........................................................................................................................................  Patient/Patient Representative Signature      ..........................................................................................................................................  Patient Representative Print Name and Relationship to Patient    ..................................................               ................................................  Date                                            Time    ..........................................................................................................................................  Reviewed by Signature/Title    ...................................................              ..............................................  Date                                                            Time

## 2017-04-14 NOTE — ED AVS SNAPSHOT
[facility] Emergency Department    [address]    [facility] [address]    Phone:  [phone]    Fax:  [phone]                                       Nevin Alvarado   MRN: [id number]    Department:  [facility] Emergency Department   Date of Visit:  4/14/2017           Patient Information     Date Of Birth          1990        Your diagnoses for this visit were:     Pelvic pain in female        You were seen by Reece Silverio MD.      Follow-up Information     Follow up with Sandra Ramos MD In 3 days.    Specialty:  Internal Medicine    Why:  As needed    Contact information:    [facility]  [address]  [facility] [address]  [phone]          Discharge Instructions       Discharge Instructions  Abdominal Pain    Abdominal pain can be caused by many things. Your evaluation today does not show the exact cause for your pain. Your doctor today has decided that it is unlikely your pain is due to a life threatening problem, or a problem requiring surgery or hospital admission. Sometimes those problems cannot be found right away, so it is very important that you follow up as directed.  Sometimes only the changes which occur over time allow the cause of your pain to be found.    Return to the Emergency Department for a recheck in 8-12 hours if your pain continues.  If your pain gets worse, changes in location, or feels different, return to the Emergency Department right away.    ADULTS:  Return to the Emergency Department right away if:      You get an oral temperature above 102oF or as directed by your doctor.    You have blood in your stools (bright red or black, tarry stools).    You keep throwing up or can t drink liquids.    You see blood when you throw up.    You can t have a bowel movement or you can t pass gas.    Your stomach gets bloated or bigger.    Your skin or the whites of your eyes look yellow.    You  faint.    You have bloody, frequent or painful urination.    You have new symptoms or anything that worries you.    CHILDREN:  Return to the Emergency Department right away if your child has any of the above-listed symptoms or the following:      Pushes your hand away or screams/cries when his/her belly is touched.    You notice your child is very fussy or weak.    Your child is very tired and is too tired to eat or drink.    Your child is dehydrated.  Signs of dehydration can be:  o Your infant has had no wet diapers in 4-5 hours.  o Your older child has not passed urine in 6-8 hours.  o Your infant or child starts to have dry mouth and lips, or no saliva or tears.    PREGNANT WOMEN:  Return to the Emergency Department right away if you have any of the above-listed symptoms or the following:      You have bleeding, leaking fluid or passing tissue from the vagina.    You have worse pain or cramping, or pain in your shoulder or back.    You have vomiting that will not stop.    You have painful or bloody urination.    You have a temperature of 100oF or more.    Your baby is not moving as much as usual.    You faint.    You get a bad headache with or without eye problems and abdominal pain.    You have a convulsion or seizure.    You have unusual discharge from your vagina and abdominal pain.    Abdominal pain is pretty common during pregnancy.  Your pain may or may not be related to your pregnancy. You should follow-up closely with your OB doctor so they can evaluate you and your baby.  Until you follow-up with your regular doctor, do the following:       Avoid sex and do not put anything in your vagina.    Drink clear fluids.    Only take medications approved by your doctor.    MORE INFORMATION:    Appendicitis:  A possible cause of abdominal pain in any person who still has their appendix is acute appendicitis. Appendicitis is often hard to diagnose.  Testing does not always rule out early appendicitis or other  "causes of abdominal pain. Close follow-up with your doctor and re-evaluations may be needed to figure out the reason for your abdominal pain.    Follow-up:  It is very important that you make an appointment with your clinic and go to the appointment.  If you do not follow-up with your primary doctor, it may result in missing an important development which could result in permanent injury or disability and/or lasting pain.  If there is any problem keeping your appointment, call your doctor or return to the Emergency Department.    Medications:  Take your medications as directed by your doctor today.  Before using over-the-counter medications, ask your doctor and make sure to take the medications as directed.  If you have any questions about medications, ask your doctor.    Diet:  Resume your normal diet as much as possible, but do not eat fried, fatty or spicy foods while you have pain.  Do not drink alcohol or have caffeine.  Do not smoke tobacco.    Probiotics: If you have been given an antibiotic, you may want to also take a probiotic pill or eat yogurt with live cultures. Probiotics have \"good bacteria\" to help your intestines stay healthy. Studies have shown that probiotics help prevent diarrhea and other intestine problems (including C. diff infection) when you take antibiotics. You can buy these without a prescription in the pharmacy section of the store.     If you were given a prescription for medicine here today, be sure to read all of the information (including the package insert) that comes with your prescription.  This will include important information about the medicine, its side effects, and any warnings that you need to know about.  The pharmacist who fills the prescription can provide more information and answer questions you may have about the medicine.  If you have questions or concerns that the pharmacist cannot address, please call or return to the Emergency Department.         Opioid Medication " Information    Pain medications are among the most commonly prescribed medicines, so we are including this information for all our patients. If you did not receive pain medication or get a prescription for pain medicine, you can ignore it.     You may have been given a prescription for an opioid (narcotic) pain medicine and/or have received a pain medicine while here in the Emergency Department. These medicines can make you drowsy or impaired. You must not drive, operate dangerous equipment, or engage in any other dangerous activities while taking these medications. If you drive while taking these medications, you could be arrested for DUI, or driving under the influence. Do not drink any alcohol while you are taking these medications.     Opioid pain medications can cause addiction. If you have a history of chemical dependency of any type, you are at a higher risk of becoming addicted to pain medications.  Only take these prescribed medications to treat your pain when all other options have been tried. Take it for as short a time and as few doses as possible. Store your pain pills in a secure place, as they are frequently stolen and provide a dangerous opportunity for children or visitors in your house to start abusing these powerful medications. We will not replace any lost or stolen medicine.  As soon as your pain is better, you should flush all your remaining medication.     Many prescription pain medications contain Tylenol  (acetaminophen), including Vicodin , Tylenol #3 , Norco , Lortab , and Percocet .  You should not take any extra pills of Tylenol  if you are using these prescription medications or you can get very sick.  Do not ever take more than 3000 mg of acetaminophen in any 24 hour period.    All opioids tend to cause constipation. Drink plenty of water and eat foods that have a lot of fiber, such as fruits, vegetables, prune juice, apple juice and high fiber cereal.  Take a laxative if you don t  move your bowels at least every other day. Miralax , Milk of Magnesia, Colace , or Senna  can be used to keep you regular.      Remember that you can always come back to the Emergency Department if you are not able to see your regular doctor in the amount of time listed above, if you get any new symptoms, or if there is anything that worries you.          Future Appointments        Provider Department Dept Phone Center    4/18/2017 9:45 AM Carli Chiang PA-C [facility] [phone]       24 Hour Appointment Hotline       To make an appointment at any [facility], call [phone] ([phone]). If you don't have a family doctor or clinic, we will help you find one. [facility] are conveniently located to serve the needs of you and your family.             Review of your medicines      START taking        Dose / Directions Last dose taken    ondansetron 4 MG ODT tab   Commonly known as:  ZOFRAN ODT   Dose:  4 mg   Quantity:  10 tablet        Take 1 tablet (4 mg) by mouth every 6 hours as needed for nausea   Refills:  0        oxyCODONE 5 MG IR tablet   Commonly known as:  ROXICODONE   Dose:  5 mg   Quantity:  10 tablet        Take 1 tablet (5 mg) by mouth every 4 hours as needed for pain   Refills:  0          CONTINUE these medicines which may have CHANGED, or have new prescriptions. If we are uncertain of the size of tablets/capsules you have at home, strength may be listed as something that might have changed.        Dose / Directions Last dose taken    ibuprofen 600 MG tablet   Commonly known as:  ADVIL/MOTRIN   Dose:  600 mg   What changed:    - medication strength  - how much to take  - when to take this   Quantity:  30 tablet        Take 1 tablet (600 mg) by mouth every 6 hours as needed for moderate pain   Refills:  1          Our records show that you are taking the medicines listed below. If these are incorrect, please call your family doctor or clinic.         Dose / Directions Last dose taken    albuterol 108 (90 BASE) MCG/ACT Inhaler   Commonly known as:  albuterol   Dose:  2 puff   Quantity:  1 Inhaler        Inhale 2 puffs into the lungs every 4 hours as needed for shortness of breath / dyspnea   Refills:  0        augmented betamethasone dipropionate 0.05 % ointment   Commonly known as:  DIPROLENE-AF   Quantity:  45 g        Apply to AA BID x 2-3 weeks then PRN only   Refills:  3        guaiFENesin-codeine 100-10 MG/5ML Soln solution   Commonly known as:  ROBITUSSIN AC   Dose:  1 tsp.   Quantity:  120 mL        Take 5 mLs by mouth every 4 hours as needed for cough   Refills:  0        polyethylene glycol powder   Commonly known as:  MIRALAX/GLYCOLAX   Dose:  17 g   Quantity:  510 g        Take 17 g by mouth daily as needed for constipation   Refills:  3        PRISTIQ PO   Dose:  100 mg        Take 100 mg by mouth daily   Refills:  0        senna 8.6 MG tablet   Commonly known as:  SENOKOT   Dose:  1 tablet   Quantity:  60 tablet        Take 1 tablet by mouth 2 times daily as needed for constipation   Refills:  0        SUMATRIPTAN SUCCINATE PO   Dose:  50 mg        Take 50 mg by mouth once as needed for migraine   Refills:  0        TOPIRAMATE PO        Take 50 mg by mouth in the morning and 100 mg by mouth in the evening   Refills:  0        VITAMIN D3 PO   Dose:  5000 Units        Take 5,000 Units by mouth daily   Refills:  0                Prescriptions were sent or printed at these locations (3 Prescriptions)                   Other Prescriptions                Printed at Department/Unit printer (3 of 3)         ibuprofen (ADVIL/MOTRIN) 600 MG tablet               oxyCODONE (ROXICODONE) 5 MG IR tablet               ondansetron (ZOFRAN ODT) 4 MG ODT tab                Procedures and tests performed during your visit     Chlamydia trachomatis PCR    HCG qualitative urine    Neisseria gonorrhoea PCR    UA with Microscopic    US Pel W/Trans*    Wet prep      Orders  Needing Specimen Collection     None      Pending Results     Date and Time Order Name Status Description    4/14/2017 2307 Neisseria gonorrhoea PCR In process     4/14/2017 2307 Chlamydia trachomatis PCR In process             Pending Culture Results     Date and Time Order Name Status Description    4/14/2017 2307 Neisseria gonorrhoea PCR In process     4/14/2017 2307 Chlamydia trachomatis PCR In process             Test Results From Your Hospital Stay        4/14/2017 11:28 PM      Component Results     Component Value Ref Range & Units Status    HCG Qual Urine Negative NEG Final         4/14/2017 11:30 PM      Component Results     Component Value Ref Range & Units Status    Color Urine Yellow  Final    Appearance Urine Slightly Cloudy  Final    Glucose Urine Negative NEG mg/dL Final    Bilirubin Urine Negative NEG Final    Ketones Urine Negative NEG mg/dL Final    Specific Gravity Urine 1.017 1.003 - 1.035 Final    Blood Urine Negative NEG Final    pH Urine 5.0 5.0 - 7.0 pH Final    Protein Albumin Urine Negative NEG mg/dL Final    Urobilinogen mg/dL 0.0 0.0 - 2.0 mg/dL Final    Nitrite Urine Negative NEG Final    Leukocyte Esterase Urine Negative NEG Final    Source Midstream Urine  Final    WBC Urine 2 0 - 2 /HPF Final    RBC Urine 1 0 - 2 /HPF Final    Bacteria Urine Few (A) NEG /HPF Final    Squamous Epithelial /HPF Urine 4 (H) 0 - 1 /HPF Final    Mucous Urine Present (A) NEG /LPF Final         4/15/2017 12:14 AM      Component Results     Component    Specimen Description    Vagina    Wet Prep    No WBC'S seen  No Trichomonas seen  No clue cells seen  No yeast seen      Micro Report Status    FINAL 04/15/2017         4/14/2017 11:54 PM         4/14/2017 11:54 PM         4/15/2017  1:25 AM      Narrative     US PELVIC COMPLETE WITH TRANSVAGINAL    4/15/2017 1:21 AM     HISTORY: Pelvic pain.    TECHNIQUE:  Transvaginal images were performed to better evaluate the  patient's uterus, ovaries and endometrial  stripe.    COMPARISON: Pelvic ultrasound performed 3/7/2017.    FINDINGS:  The uterus is measured at 7 x 3.3 x 4 cm. No fibroids are  evident. IUD appears to be in good position within the endometrium.  Endometrial stripe is not well seen due to the presence of the IUD,  but is measured at 0.4 cm where visualized. The right ovary is  unremarkable. The left ovary is not visualized. No solid adnexal  masses are identified. No free pelvic fluid is present.        Impression     IMPRESSION:   1. IUD appears to be in good position within the endometrium.  2. Nonvisualization of the left ovary.  3. Otherwise unremarkable pelvic ultrasound.    DAE FRAUSTO MD                Clinical Quality Measure: Blood Pressure Screening     Your blood pressure was checked while you were in the emergency department today. The last reading we obtained was  BP: 124/87 . Please read the guidelines below about what these numbers mean and what you should do about them.  If your systolic blood pressure (the top number) is less than 120 and your diastolic blood pressure (the bottom number) is less than 80, then your blood pressure is normal. There is nothing more that you need to do about it.  If your systolic blood pressure (the top number) is 120-139 or your diastolic blood pressure (the bottom number) is 80-89, your blood pressure may be higher than it should be. You should have your blood pressure rechecked within a year by a primary care provider.  If your systolic blood pressure (the top number) is 140 or greater or your diastolic blood pressure (the bottom number) is 90 or greater, you may have high blood pressure. High blood pressure is treatable, but if left untreated over time it can put you at risk for heart attack, stroke, or kidney failure. You should have your blood pressure rechecked by a primary care provider within the next 4 weeks.  If your provider in the emergency department today gave you specific instructions to  follow-up with your doctor or provider even sooner than that, you should follow that instruction and not wait for up to 4 weeks for your follow-up visit.        Thank you for choosing [facility]       Thank you for choosing [facility] for your care. Our goal is always to provide you with excellent care. Hearing back from our patients is one way we can continue to improve our services. Please take a few minutes to complete the written survey that you may receive in the mail after you visit with us. Thank you!        [employer] Information     [employer] gives you secure access to your electronic health record. If you see a primary care provider, you can also send messages to your care team and make appointments. If you have questions, please call your primary care clinic.  If you do not have a primary care provider, please call [phone] and they will assist you.        Care EveryWhere ID     This is your Care EveryWhere ID. This could be used by other organizations to access your [facility] medical records  [id number]        After Visit Summary       This is your record. Keep this with you and show to your community pharmacist(s) and doctor(s) at your next visit.

## 2017-04-15 ENCOUNTER — APPOINTMENT (OUTPATIENT)
Dept: ULTRASOUND IMAGING | Facility: CLINIC | Age: 27
End: 2017-04-15
Attending: EMERGENCY MEDICINE
Payer: MEDICARE

## 2017-04-15 VITALS
OXYGEN SATURATION: 96 % | DIASTOLIC BLOOD PRESSURE: 87 MMHG | TEMPERATURE: 98.1 F | HEART RATE: 95 BPM | RESPIRATION RATE: 18 BRPM | SYSTOLIC BLOOD PRESSURE: 124 MMHG | HEIGHT: 62 IN

## 2017-04-15 LAB
MICRO REPORT STATUS: NORMAL
SPECIMEN SOURCE: NORMAL
WET PREP SPEC: NORMAL

## 2017-04-15 PROCEDURE — 76830 TRANSVAGINAL US NON-OB: CPT

## 2017-04-15 PROCEDURE — 25000125 ZZHC RX 250: Performed by: EMERGENCY MEDICINE

## 2017-04-15 RX ORDER — ONDANSETRON 4 MG/1
4 TABLET, ORALLY DISINTEGRATING ORAL ONCE
Status: COMPLETED | OUTPATIENT
Start: 2017-04-15 | End: 2017-04-15

## 2017-04-15 RX ORDER — IBUPROFEN 600 MG/1
600 TABLET, FILM COATED ORAL EVERY 6 HOURS PRN
Qty: 30 TABLET | Refills: 1 | Status: SHIPPED | OUTPATIENT
Start: 2017-04-15 | End: 2017-05-01

## 2017-04-15 RX ORDER — ONDANSETRON 4 MG/1
4 TABLET, ORALLY DISINTEGRATING ORAL EVERY 6 HOURS PRN
Qty: 10 TABLET | Refills: 0 | Status: ON HOLD | OUTPATIENT
Start: 2017-04-15 | End: 2017-04-21

## 2017-04-15 RX ORDER — OXYCODONE HYDROCHLORIDE 5 MG/1
5 TABLET ORAL EVERY 4 HOURS PRN
Qty: 10 TABLET | Refills: 0 | Status: ON HOLD | OUTPATIENT
Start: 2017-04-15 | End: 2017-04-21

## 2017-04-15 RX ADMIN — ONDANSETRON 4 MG: 4 TABLET, ORALLY DISINTEGRATING ORAL at 00:38

## 2017-04-15 ASSESSMENT — ENCOUNTER SYMPTOMS
NAUSEA: 1
VOMITING: 0
ABDOMINAL PAIN: 1
DIARRHEA: 1
DYSURIA: 0
FREQUENCY: 1

## 2017-04-15 NOTE — ED NOTES
Assisted MD Silverio with pelvic exam, pt tolerated and cultures were obtained and sent to lab for processing. Pt denies any foreign bodies in rectum at this time.
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
## 2017-04-15 NOTE — ED NOTES
Pt reports bilateral lower abd pain x 3 -4 days. Pt has had some loose stools. Nausea no vomiting. Pt was unable to get in to see her PCP.

## 2017-04-16 ENCOUNTER — TRANSFERRED RECORDS (OUTPATIENT)
Dept: HEALTH INFORMATION MANAGEMENT | Facility: CLINIC | Age: 27
End: 2017-04-16

## 2017-04-18 ENCOUNTER — OFFICE VISIT (OUTPATIENT)
Dept: DERMATOLOGY | Facility: CLINIC | Age: 27
End: 2017-04-18
Payer: MEDICARE

## 2017-04-18 VITALS — HEART RATE: 95 BPM | DIASTOLIC BLOOD PRESSURE: 70 MMHG | SYSTOLIC BLOOD PRESSURE: 122 MMHG | OXYGEN SATURATION: 100 %

## 2017-04-18 DIAGNOSIS — L94.0 MORPHEA: Primary | ICD-10-CM

## 2017-04-18 LAB
C TRACH DNA SPEC QL NAA+PROBE: NORMAL
N GONORRHOEA DNA SPEC QL NAA+PROBE: NORMAL
SPECIMEN SOURCE: NORMAL
SPECIMEN SOURCE: NORMAL

## 2017-04-18 PROCEDURE — 11901 INJECT SKIN LESIONS >7: CPT | Performed by: PHYSICIAN ASSISTANT

## 2017-04-18 NOTE — NURSING NOTE
"Initial /70  Pulse 95  SpO2 100% Estimated body mass index is 41.7 kg/(m^2) as calculated from the following:    Height as of 4/1/17: 1.575 m (5' 2\").    Weight as of 4/2/17: 103.4 kg (228 lb). .      "

## 2017-04-18 NOTE — PROGRESS NOTES
HPI:   Nevin Alvarado is a 26 year old female who presents for recheck of biopsy proven morphea. Has been using betamethasone cream BID to the pink areas on the lower legs but doesn't seem to be changing anything.   chief complaint  Location: bilateral lower legs   Condition present for:  About 1 year.   Previous treatments include: had topical creams prescribed but doesn't remember the names of them     Review Of Systems  Eyes: negative  Ears/Nose/Throat: negative  Respiratory: No shortness of breath, dyspnea on exertion, cough, or hemoptysis  Cardiovascular: negative  Gastrointestinal: negative  Genitourinary: negative  Musculoskeletal: negative  Neurologic: negative  Psychiatric: negative        PHYSICAL EXAM:      Skin exam performed as follows: Type 2 skin. Mood appropriate  Alert and Oriented X 3. Well developed, well nourished in no distress.  General appearance: Normal  Head including face: Normal  Eyes: conjunctiva and lids: Normal  Mouth: Lips, teeth, gums: Normal  Neck: Normal  Chest-breast/axillae: Normal  Back: Normal  Spleen and liver: Normal  Cardiovascular: Exam of peripheral vascular system by observation for swelling, varicosities, edema: Normal  Genitalia: groin, buttocks: Normal  Extremities: digits/nails (clubbing): Normal  Eccrine and Apocrine glands: Normal  Right upper extremity: Normal  Left upper extremity: Normal  Right lower extremity: Normal  Left lower extremity: Normal  Skin: Scalp and body hair: See below    1. Pink slightly atrophic patches on bilateral shins    ASSESSMENT/PLAN:     1. Biopsy proven morphea on bilateral shins. Not very inflamed but not completely burned out either. Has been applying betamethasone but doesn't see much of a change. No itching or pain. Discussed ILK to the areas and she is amenable to this. Discussed pathophysiology of morphea and potential for new lesions and scarring.   --Kenalog 5 mg/cc injected to bilateral shins, > 7 areas total. Total of 2  cc's used.  Advised on risk of atrophy and failure to respond. Advised on aftercare.  --Start gentle skin care - written instructions provided  --Continue betamethasone cream BID - hold for 1 week and then resume QD until f/u        Follow-up:1 month  CC:   Scribed By: Carli Thurman MS, PA-C

## 2017-04-18 NOTE — MR AVS SNAPSHOT
After Visit Summary   4/18/2017    Nevin Alvarado    MRN: [id number]           Patient Information     Date Of Birth          1990        Visit Information        Provider Department      4/18/2017 9:45 AM Carli Thurman PA-C [facility]        Today's Diagnoses     Morphea    -  1       Follow-ups after your visit        Your next 10 appointments already scheduled     May 19, 2017 10:15 AM CDT   Return Visit with Carli Thurman PA-C   [facility]    [address] [id number] [phone]              Who to contact     If you have questions or need follow up information about today's clinic visit or your schedule please contact [facility] directly at [phone].  Normal or non-critical lab and imaging results will be communicated to you by MyChart, letter or phone within 4 business days after the clinic has received the results. If you do not hear from us within 7 days, please contact the clinic through MyChart or phone. If you have a critical or abnormal lab result, we will notify you by phone as soon as possible.  Submit refill requests through mobintent or call your pharmacy and they will forward the refill request to us. Please allow 3 business days for your refill to be completed.          Additional Information About Your Visit        MyChart Information     mobintent gives you secure access to your electronic health record. If you see a primary care provider, you can also send messages to your care team and make appointments. If you have questions, please call your primary care clinic.  If you do not have a primary care provider, please call [phone] and they will assist you.        Care EveryWhere ID     This is your Care EveryWhere ID. This could be used by other organizations to access your [place] medical records  [id number]         Your Vitals Were     Pulse Pulse Oximetry                95 100%           Blood Pressure from Last 3 Encounters:   04/18/17 122/70   04/15/17 124/87   04/02/17 147/57    Weight from Last 3 Encounters:   04/02/17 103.4 kg (228 lb)   04/01/17 103.4 kg (228 lb)   03/12/17 103.4 kg (228 lb)              We Performed the Following     INJECTION INTO SKIN LESIONS >7     KENALOG PER 10 MG        Primary Care Provider Office Phone # Fax #    Sandra Ramos [phone]       [facility] [address]  [facility] [address]        Thank you!     Thank you for choosing [facility]  for your care. Our goal is always to provide you with excellent care. Hearing back from our patients is one way we can continue to improve our services. Please take a few minutes to complete the written survey that you may receive in the mail after your visit with us. Thank you!             Your Updated Medication List - Protect others around you: Learn how to safely use, store and throw away your medicines at www.disposemymeds.org.          This list is accurate as of: 4/18/17 12:41 PM.  Always use your most recent med list.                   Brand Name Dispense Instructions for use    albuterol 108 (90 BASE) MCG/ACT Inhaler    albuterol    1 Inhaler    Inhale 2 puffs into the lungs every 4 hours as needed for shortness of breath / dyspnea       augmented betamethasone dipropionate 0.05 % ointment    DIPROLENE-AF    45 g    Apply to AA BID x 2-3 weeks then PRN only       guaiFENesin-codeine 100-10 MG/5ML Soln solution    ROBITUSSIN AC    120 mL    Take 5 mLs by mouth every 4 hours as needed for cough       ibuprofen 600 MG tablet    ADVIL/MOTRIN    30 tablet    Take 1 tablet (600 mg) by mouth every 6 hours as needed for moderate pain       ondansetron 4 MG ODT tab    ZOFRAN ODT    10 tablet    Take 1 tablet (4 mg) by mouth every 6 hours as needed for nausea       oxyCODONE 5 MG IR  tablet    ROXICODONE    10 tablet    Take 1 tablet (5 mg) by mouth every 4 hours as needed for pain       polyethylene glycol powder    MIRALAX/GLYCOLAX    510 g    Take 17 g by mouth daily as needed for constipation       PRISTIQ PO      Take 100 mg by mouth daily       senna 8.6 MG tablet    SENOKOT    60 tablet    Take 1 tablet by mouth 2 times daily as needed for constipation       SUMATRIPTAN SUCCINATE PO      Take 50 mg by mouth once as needed for migraine       TOPIRAMATE PO      Take 50 mg by mouth in the morning and 100 mg by mouth in the evening       VITAMIN D3 PO      Take 5,000 Units by mouth daily

## 2017-04-19 ENCOUNTER — APPOINTMENT (OUTPATIENT)
Dept: GENERAL RADIOLOGY | Facility: CLINIC | Age: 27
DRG: 885 | End: 2017-04-19
Attending: EMERGENCY MEDICINE
Payer: MEDICARE

## 2017-04-19 ENCOUNTER — HOSPITAL ENCOUNTER (INPATIENT)
Facility: CLINIC | Age: 27
LOS: 1 days | Discharge: HOME | DRG: 885 | End: 2017-04-21
Attending: EMERGENCY MEDICINE | Admitting: PSYCHIATRY & NEUROLOGY
Payer: MEDICARE

## 2017-04-19 DIAGNOSIS — T18.2XXA FOREIGN BODY IN STOMACH, INITIAL ENCOUNTER: ICD-10-CM
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
## 2017-04-19 DIAGNOSIS — F60.3 BORDERLINE PERSONALITY DISORDER (H): ICD-10-CM

## 2017-04-19 DIAGNOSIS — F33.1 MODERATE EPISODE OF RECURRENT MAJOR DEPRESSIVE DISORDER (H): ICD-10-CM

## 2017-04-19 DIAGNOSIS — T18.9XXA SWALLOWED FOREIGN BODY, INITIAL ENCOUNTER: ICD-10-CM

## 2017-04-19 LAB
AMPHETAMINES UR QL SCN: NORMAL
BARBITURATES UR QL: NORMAL
BENZODIAZ UR QL: NORMAL
CANNABINOIDS UR QL SCN: NORMAL
COCAINE UR QL: NORMAL
ETHANOL UR QL SCN: NORMAL
HCG UR QL: NEGATIVE
OPIATES UR QL SCN: NORMAL

## 2017-04-19 PROCEDURE — 25000132 ZZH RX MED GY IP 250 OP 250 PS 637: Mod: GY | Performed by: EMERGENCY MEDICINE

## 2017-04-19 PROCEDURE — 80307 DRUG TEST PRSMV CHEM ANLYZR: CPT | Performed by: PSYCHIATRY & NEUROLOGY

## 2017-04-19 PROCEDURE — 90791 PSYCH DIAGNOSTIC EVALUATION: CPT

## 2017-04-19 PROCEDURE — A9270 NON-COVERED ITEM OR SERVICE: HCPCS | Mod: GY | Performed by: EMERGENCY MEDICINE

## 2017-04-19 PROCEDURE — 80320 DRUG SCREEN QUANTALCOHOLS: CPT | Performed by: PSYCHIATRY & NEUROLOGY

## 2017-04-19 PROCEDURE — 74010 XR KUB: CPT | Mod: 52

## 2017-04-19 PROCEDURE — 81025 URINE PREGNANCY TEST: CPT | Performed by: PSYCHIATRY & NEUROLOGY

## 2017-04-19 RX ORDER — DOCUSATE SODIUM 100 MG/1
100 CAPSULE, LIQUID FILLED ORAL 2 TIMES DAILY PRN
Status: DISCONTINUED | OUTPATIENT
Start: 2017-04-20 | End: 2017-04-19

## 2017-04-19 RX ORDER — TOPIRAMATE 100 MG/1
100 TABLET, FILM COATED ORAL ONCE
Status: COMPLETED | OUTPATIENT
Start: 2017-04-19 | End: 2017-04-19

## 2017-04-19 RX ORDER — DOCUSATE SODIUM 100 MG/1
100 CAPSULE, LIQUID FILLED ORAL ONCE
Status: COMPLETED | OUTPATIENT
Start: 2017-04-19 | End: 2017-04-19

## 2017-04-19 RX ADMIN — TOPIRAMATE 100 MG: 100 TABLET, FILM COATED ORAL at 20:19

## 2017-04-19 RX ADMIN — DOCUSATE SODIUM 100 MG: 100 CAPSULE, LIQUID FILLED ORAL at 20:19

## 2017-04-19 ASSESSMENT — ENCOUNTER SYMPTOMS
HALLUCINATIONS: 0
DYSPHORIC MOOD: 1
NERVOUS/ANXIOUS: 1

## 2017-04-19 NOTE — IP AVS SNAPSHOT
[place] [address]    [place] [address]    Phone:  [phone]                                       After Visit Summary   4/19/2017    Nevin Alvarado    MRN: [id number]           After Visit Summary Signature Page     I have received my discharge instructions, and my questions have been answered. I have discussed any challenges I see with this plan with the nurse or doctor.    ..........................................................................................................................................  Patient/Patient Representative Signature      ..........................................................................................................................................  Patient Representative Print Name and Relationship to Patient    ..................................................               ................................................  Date                                            Time    ..........................................................................................................................................  Reviewed by Signature/Title    ...................................................              ..............................................  Date                                                            Time

## 2017-04-19 NOTE — IP AVS SNAPSHOT
MRN:[id number]                      After Visit Summary   4/19/2017    Nevin Alvarado    MRN: [id number]           Thank you!     Thank you for choosing [facility] for your care. Our goal is always to provide you with excellent care.        Patient Information     Date Of Birth          1990        Designated Caregiver       Most Recent Value    Caregiver    Will someone help with your care after discharge? no      About your hospital stay     You were admitted on:  April 20, 2017 You last received care in the:  [facility]    You were discharged on:  April 21, 2017       Who to Call     For medical emergencies, please call 911.  For non-urgent questions about your medical care, please call your primary care provider or clinic, [phone]  For questions related to your surgery, please call your surgery clinic        Attending Provider     Provider Specialty    Alyssa Menon MD Emergency Medicine    Kamar Cabrales MD Psychiatry       Primary Care Provider Office Phone # Fax #    Sandra Ramos [phone]       [address] [address]        Your next 10 appointments already scheduled     May 19, 2017 10:15 AM CDT   Return Visit with Carli Thurman PA-C   [facility] ([facility]    [address] [address] [phone]              Further instructions from your care team       Behavioral Discharge Planning and Instructions      Summary:  You were admitted on 4/19/2017  for Suicidal Ideations and Self Injurious Behaviors.  You were treated by Dr. Kamar Cabrales MD and discharged on 4/21/2017 from [facility]. You are returning home with a treatment plan set in place and are encouraged to reach out to the supportive staff at home to help promote your continued safety.     Main Diagnosis: Major Depressive Disorder     Health Care Follow-up Appointments:  "    [facility]- Monday May 8th   [facility] [address] [place], [place] [phone] Fax: [phone]    Attend all scheduled appointments with your outpatient providers. Call at least 24 hours in advance if you need to reschedule an appointment to ensure continued access to your outpatient providers.   Major Treatments, Procedures and Findings:  You were provided with: a psychiatric assessment, assessed for medical stability, medication evaluation and/or management, group therapy, milieu management and medical interventions    Symptoms to Report: losing more sleep, mood getting worse or thoughts of suicide    Early warning signs can include: increased depression or anxiety increased thoughts or behaviors of suicide or self-harm  increased unusual thinking, such as paranoia or hearing voices    Safety and Wellness:  Take all medicines as directed.  Make no changes unless your doctor suggests them.      Follow treatment recommendations.  Refrain from alcohol and non-prescribed drugs.  If there is a concern for safety, call 911.    Resources:   Crisis Intervention: 395.572.2816 or 037-554-8806 (TTY: 682.195.5102).  Call anytime for help.  National [place] on Mental Illness (www.[place].darnell.org): 525.938.8405 or 135-636-7235.  Suicide Awareness Voices of Education (SAVE) (www.save.org): 591-247-CDIO (3957)  National Suicide Prevention Line (www.mentalhealthmn.org): 951-431-RSOB (7726)  Mental Health Consumer/Survivor Network of [place] (www.mhcsn.net): 101.201.3903 or 134-414-9604  Self- Management and Recovery Training., SMART-- Toll free: 484.167.6681  www.[employer].org  [facility] Crisis Response 231-779-3318  Text 4 Life: txt \"LIFE\" to 73823 for immediate support and crisis intervention  Crisis text line: Text \"START\" to 296-281. Free, confidential, 24/7.  Crisis Intervention: 223.437.2826 or 607-317-7770. Call anytime for help.     The treatment team has appreciated the opportunity to " "work with you.     If you have any questions or concerns our unit number is [phone]  You may be receiving a follow-up phone call within the next three days from a representative from behavioral health.            Pending Results     No orders found from 4/17/2017 to 4/20/2017.            Admission Information     Date & Time Provider Department Dept. Phone    4/19/2017 Kamar Cabrales MD [facility] [phone]      Your Vitals Were     Blood Pressure Pulse Temperature Respirations Height Weight    148/94 91 98  F (36.7  C) 14 1.575 m (5' 2\") 99.8 kg (220 lb)    Pulse Oximetry BMI (Body Mass Index)                97% 40.24 kg/m2          [facility] Information     [employer] gives you secure access to your electronic health record. If you see a primary care provider, you can also send messages to your care team and make appointments. If you have questions, please call your primary care clinic.  If you do not have a primary care provider, please call [phone] and they will assist you.        Care EveryWhere ID     This is your Care EveryWhere ID. This could be used by other organizations to access your [facility] medical records  [id number]           Review of your medicines      CONTINUE these medicines which have NOT CHANGED        Dose / Directions    albuterol 108 (90 BASE) MCG/ACT Inhaler   Commonly known as:  albuterol        Dose:  2 puff   Inhale 2 puffs into the lungs every 4 hours as needed for shortness of breath / dyspnea   Quantity:  1 Inhaler   Refills:  0       ibuprofen 600 MG tablet   Commonly known as:  ADVIL/MOTRIN        Dose:  600 mg   Take 1 tablet (600 mg) by mouth every 6 hours as needed for moderate pain   Quantity:  30 tablet   Refills:  1       polyethylene glycol powder   Commonly known as:  MIRALAX/GLYCOLAX   Used for:  Rectal foreign body, subsequent encounter        Dose:  17 g   Take 17 g by mouth daily as needed for constipation   Quantity:  510 g   Refills:  3       PRISTIQ PO     "    Dose:  100 mg   Take 100 mg by mouth daily   Refills:  0       senna 8.6 MG tablet   Commonly known as:  SENOKOT   Used for:  Acute post-operative pain        Dose:  1 tablet   Take 1 tablet by mouth 2 times daily as needed for constipation   Quantity:  60 tablet   Refills:  0       SUMATRIPTAN SUCCINATE PO        Dose:  50 mg   Take 50 mg by mouth once as needed for migraine   Refills:  0       TOPIRAMATE PO        Take 50 mg by mouth in the morning and 100 mg by mouth in the evening   Refills:  0       VITAMIN D3 PO        Dose:  5000 Units   Take 5,000 Units by mouth daily   Refills:  0         STOP taking     augmented betamethasone dipropionate 0.05 % ointment   Commonly known as:  DIPROLENE-AF           guaiFENesin-codeine 100-10 MG/5ML Soln solution   Commonly known as:  ROBITUSSIN AC           ondansetron 4 MG ODT tab   Commonly known as:  ZOFRAN ODT           oxyCODONE 5 MG IR tablet   Commonly known as:  ROXICODONE                    Protect others around you: Learn how to safely use, store and throw away your medicines at www.disposemymeds.org.             Medication List: This is a list of all your medications and when to take them. Check marks below indicate your daily home schedule. Keep this list as a reference.      Medications           Morning Afternoon Evening Bedtime As Needed    albuterol 108 (90 BASE) MCG/ACT Inhaler   Commonly known as:  albuterol   Inhale 2 puffs into the lungs every 4 hours as needed for shortness of breath / dyspnea   Last time this was given:  2 puffs on 4/20/2017  9:41 PM                                ibuprofen 600 MG tablet   Commonly known as:  ADVIL/MOTRIN   Take 1 tablet (600 mg) by mouth every 6 hours as needed for moderate pain   Last time this was given:  200 mg on 4/20/2017  7:42 PM                                polyethylene glycol powder   Commonly known as:  MIRALAX/GLYCOLAX   Take 17 g by mouth daily as needed for constipation                                 PRISTIQ PO   Take 100 mg by mouth daily   Last time this was given:  100 mg on 4/21/2017  8:16 AM                                senna 8.6 MG tablet   Commonly known as:  SENOKOT   Take 1 tablet by mouth 2 times daily as needed for constipation                                SUMATRIPTAN SUCCINATE PO   Take 50 mg by mouth once as needed for migraine                                TOPIRAMATE PO   Take 50 mg by mouth in the morning and 100 mg by mouth in the evening   Last time this was given:  50 mg on 4/21/2017  8:14 AM                                VITAMIN D3 PO   Take 5,000 Units by mouth daily   Last time this was given:  5,000 Units on 4/21/2017  8:14 AM

## 2017-04-20 ENCOUNTER — ANESTHESIA EVENT (OUTPATIENT)
Dept: SURGERY | Facility: CLINIC | Age: 27
DRG: 885 | End: 2017-04-20
Payer: MEDICARE

## 2017-04-20 ENCOUNTER — SURGERY (OUTPATIENT)
Age: 27
End: 2017-04-20

## 2017-04-20 ENCOUNTER — ANESTHESIA (OUTPATIENT)
Dept: SURGERY | Facility: CLINIC | Age: 27
DRG: 885 | End: 2017-04-20
Payer: MEDICARE

## 2017-04-20 PROBLEM — R45.851 SUICIDAL INTENT: Status: ACTIVE | Noted: 2017-04-20

## 2017-04-20 LAB
ANION GAP SERPL CALCULATED.3IONS-SCNC: 9 MMOL/L (ref 3–14)
BASOPHILS # BLD AUTO: 0 10E9/L (ref 0–0.2)
BASOPHILS NFR BLD AUTO: 0.2 %
BUN SERPL-MCNC: 9 MG/DL (ref 7–30)
CALCIUM SERPL-MCNC: 8.9 MG/DL (ref 8.5–10.1)
CHLORIDE SERPL-SCNC: 110 MMOL/L (ref 94–109)
CO2 SERPL-SCNC: 23 MMOL/L (ref 20–32)
CREAT SERPL-MCNC: 0.55 MG/DL (ref 0.52–1.04)
DIFFERENTIAL METHOD BLD: NORMAL
EOSINOPHIL # BLD AUTO: 0.1 10E9/L (ref 0–0.7)
EOSINOPHIL NFR BLD AUTO: 1 %
ERYTHROCYTE [DISTWIDTH] IN BLOOD BY AUTOMATED COUNT: 13.2 % (ref 10–15)
GFR SERPL CREATININE-BSD FRML MDRD: ABNORMAL ML/MIN/1.7M2
GLUCOSE SERPL-MCNC: 93 MG/DL (ref 70–99)
HCT VFR BLD AUTO: 38.5 % (ref 35–47)
HGB BLD-MCNC: 12.9 G/DL (ref 11.7–15.7)
IMM GRANULOCYTES # BLD: 0 10E9/L (ref 0–0.4)
IMM GRANULOCYTES NFR BLD: 0.1 %
LYMPHOCYTES # BLD AUTO: 2.2 10E9/L (ref 0.8–5.3)
LYMPHOCYTES NFR BLD AUTO: 21.7 %
MCH RBC QN AUTO: 29.6 PG (ref 26.5–33)
MCHC RBC AUTO-ENTMCNC: 33.5 G/DL (ref 31.5–36.5)
MCV RBC AUTO: 88 FL (ref 78–100)
MONOCYTES # BLD AUTO: 0.5 10E9/L (ref 0–1.3)
MONOCYTES NFR BLD AUTO: 4.9 %
NEUTROPHILS # BLD AUTO: 7.3 10E9/L (ref 1.6–8.3)
NEUTROPHILS NFR BLD AUTO: 72.1 %
NRBC # BLD AUTO: 0 10*3/UL
NRBC BLD AUTO-RTO: 0 /100
PLATELET # BLD AUTO: 299 10E9/L (ref 150–450)
POTASSIUM SERPL-SCNC: 3.6 MMOL/L (ref 3.4–5.3)
RBC # BLD AUTO: 4.36 10E12/L (ref 3.8–5.2)
SODIUM SERPL-SCNC: 142 MMOL/L (ref 133–144)
UPPER GI ENDOSCOPY: NORMAL
WBC # BLD AUTO: 10.1 10E9/L (ref 4–11)

## 2017-04-20 PROCEDURE — 71000014 ZZH RECOVERY PHASE 1 LEVEL 2 FIRST HR: Performed by: INTERNAL MEDICINE

## 2017-04-20 PROCEDURE — 99221 1ST HOSP IP/OBS SF/LOW 40: CPT | Performed by: PHYSICIAN ASSISTANT

## 2017-04-20 PROCEDURE — 85025 COMPLETE CBC W/AUTO DIFF WBC: CPT | Performed by: EMERGENCY MEDICINE

## 2017-04-20 PROCEDURE — 25000566 ZZH SEVOFLURANE, EA 15 MIN: Performed by: INTERNAL MEDICINE

## 2017-04-20 PROCEDURE — 80048 BASIC METABOLIC PNL TOTAL CA: CPT | Performed by: EMERGENCY MEDICINE

## 2017-04-20 PROCEDURE — 25000125 ZZHC RX 250: Performed by: ANESTHESIOLOGY

## 2017-04-20 PROCEDURE — 36000051 ZZH SURGERY LEVEL 2 1ST 30 MIN - UMMC: Performed by: INTERNAL MEDICINE

## 2017-04-20 PROCEDURE — 99285 EMERGENCY DEPT VISIT HI MDM: CPT | Mod: 25 | Performed by: EMERGENCY MEDICINE

## 2017-04-20 PROCEDURE — A9270 NON-COVERED ITEM OR SERVICE: HCPCS | Mod: GY | Performed by: PSYCHIATRY & NEUROLOGY

## 2017-04-20 PROCEDURE — 25000132 ZZH RX MED GY IP 250 OP 250 PS 637: Mod: GY | Performed by: PSYCHIATRY & NEUROLOGY

## 2017-04-20 PROCEDURE — 37000008 ZZH ANESTHESIA TECHNICAL FEE, 1ST 30 MIN: Performed by: INTERNAL MEDICINE

## 2017-04-20 PROCEDURE — 99223 1ST HOSP IP/OBS HIGH 75: CPT | Mod: AI | Performed by: PSYCHIATRY & NEUROLOGY

## 2017-04-20 PROCEDURE — 25000128 H RX IP 250 OP 636: Performed by: ANESTHESIOLOGY

## 2017-04-20 PROCEDURE — 71000015 ZZH RECOVERY PHASE 1 LEVEL 2 EA ADDTL HR: Performed by: INTERNAL MEDICINE

## 2017-04-20 PROCEDURE — 99285 EMERGENCY DEPT VISIT HI MDM: CPT | Mod: Z6 | Performed by: EMERGENCY MEDICINE

## 2017-04-20 PROCEDURE — 25800025 ZZH RX 258: Performed by: ANESTHESIOLOGY

## 2017-04-20 PROCEDURE — 12400003 ZZH R&B MH CRITICAL UMMC

## 2017-04-20 PROCEDURE — 36000053 ZZH SURGERY LEVEL 2 EA 15 ADDTL MIN - UMMC: Performed by: INTERNAL MEDICINE

## 2017-04-20 PROCEDURE — 0DC98ZZ EXTIRPATION OF MATTER FROM DUODENUM, VIA NATURAL OR ARTIFICIAL OPENING ENDOSCOPIC: ICD-10-PCS | Performed by: INTERNAL MEDICINE

## 2017-04-20 PROCEDURE — A9270 NON-COVERED ITEM OR SERVICE: HCPCS | Mod: GY | Performed by: PHYSICIAN ASSISTANT

## 2017-04-20 PROCEDURE — 37000009 ZZH ANESTHESIA TECHNICAL FEE, EACH ADDTL 15 MIN: Performed by: INTERNAL MEDICINE

## 2017-04-20 PROCEDURE — 25000132 ZZH RX MED GY IP 250 OP 250 PS 637: Mod: GY | Performed by: PHYSICIAN ASSISTANT

## 2017-04-20 PROCEDURE — 97150 GROUP THERAPEUTIC PROCEDURES: CPT | Mod: GO

## 2017-04-20 PROCEDURE — 27210794 ZZH OR GENERAL SUPPLY STERILE: Performed by: INTERNAL MEDICINE

## 2017-04-20 RX ORDER — IBUPROFEN 200 MG
200 TABLET ORAL EVERY 6 HOURS PRN
Status: DISCONTINUED | OUTPATIENT
Start: 2017-04-20 | End: 2017-04-21 | Stop reason: HOSPADM

## 2017-04-20 RX ORDER — FENTANYL CITRATE 50 UG/ML
INJECTION, SOLUTION INTRAMUSCULAR; INTRAVENOUS PRN
Status: DISCONTINUED | OUTPATIENT
Start: 2017-04-20 | End: 2017-04-20

## 2017-04-20 RX ORDER — DOCUSATE SODIUM 100 MG/1
100 CAPSULE, LIQUID FILLED ORAL 2 TIMES DAILY PRN
Status: DISCONTINUED | OUTPATIENT
Start: 2017-04-20 | End: 2017-04-21 | Stop reason: HOSPADM

## 2017-04-20 RX ORDER — ACETAMINOPHEN 325 MG/1
650 TABLET ORAL EVERY 4 HOURS PRN
Status: DISCONTINUED | OUTPATIENT
Start: 2017-04-20 | End: 2017-04-21 | Stop reason: HOSPADM

## 2017-04-20 RX ORDER — DEXAMETHASONE SODIUM PHOSPHATE 4 MG/ML
INJECTION, SOLUTION INTRA-ARTICULAR; INTRALESIONAL; INTRAMUSCULAR; INTRAVENOUS; SOFT TISSUE PRN
Status: DISCONTINUED | OUTPATIENT
Start: 2017-04-20 | End: 2017-04-20

## 2017-04-20 RX ORDER — SODIUM CHLORIDE, SODIUM LACTATE, POTASSIUM CHLORIDE, CALCIUM CHLORIDE 600; 310; 30; 20 MG/100ML; MG/100ML; MG/100ML; MG/100ML
INJECTION, SOLUTION INTRAVENOUS CONTINUOUS PRN
Status: DISCONTINUED | OUTPATIENT
Start: 2017-04-20 | End: 2017-04-20

## 2017-04-20 RX ORDER — ONDANSETRON 2 MG/ML
INJECTION INTRAMUSCULAR; INTRAVENOUS PRN
Status: DISCONTINUED | OUTPATIENT
Start: 2017-04-20 | End: 2017-04-20

## 2017-04-20 RX ORDER — TOPIRAMATE 50 MG/1
100 TABLET, FILM COATED ORAL AT BEDTIME
Status: DISCONTINUED | OUTPATIENT
Start: 2017-04-20 | End: 2017-04-21 | Stop reason: HOSPADM

## 2017-04-20 RX ORDER — ONDANSETRON 2 MG/ML
4 INJECTION INTRAMUSCULAR; INTRAVENOUS EVERY 30 MIN PRN
Status: DISCONTINUED | OUTPATIENT
Start: 2017-04-20 | End: 2017-04-20 | Stop reason: HOSPADM

## 2017-04-20 RX ORDER — PROPOFOL 10 MG/ML
INJECTION, EMULSION INTRAVENOUS PRN
Status: DISCONTINUED | OUTPATIENT
Start: 2017-04-20 | End: 2017-04-20

## 2017-04-20 RX ORDER — OLANZAPINE 10 MG/2ML
10 INJECTION, POWDER, FOR SOLUTION INTRAMUSCULAR
Status: DISCONTINUED | OUTPATIENT
Start: 2017-04-20 | End: 2017-04-21 | Stop reason: HOSPADM

## 2017-04-20 RX ORDER — LIDOCAINE HYDROCHLORIDE 20 MG/ML
INJECTION, SOLUTION INFILTRATION; PERINEURAL PRN
Status: DISCONTINUED | OUTPATIENT
Start: 2017-04-20 | End: 2017-04-20

## 2017-04-20 RX ORDER — FENTANYL CITRATE 50 UG/ML
25-50 INJECTION, SOLUTION INTRAMUSCULAR; INTRAVENOUS
Status: DISCONTINUED | OUTPATIENT
Start: 2017-04-20 | End: 2017-04-20 | Stop reason: HOSPADM

## 2017-04-20 RX ORDER — DESVENLAFAXINE 50 MG/1
100 TABLET, FILM COATED, EXTENDED RELEASE ORAL DAILY
Status: DISCONTINUED | OUTPATIENT
Start: 2017-04-20 | End: 2017-04-21 | Stop reason: HOSPADM

## 2017-04-20 RX ORDER — OLANZAPINE 10 MG/1
10 TABLET ORAL
Status: DISCONTINUED | OUTPATIENT
Start: 2017-04-20 | End: 2017-04-21 | Stop reason: HOSPADM

## 2017-04-20 RX ORDER — ONDANSETRON 4 MG/1
4 TABLET, ORALLY DISINTEGRATING ORAL EVERY 30 MIN PRN
Status: DISCONTINUED | OUTPATIENT
Start: 2017-04-20 | End: 2017-04-20 | Stop reason: HOSPADM

## 2017-04-20 RX ORDER — ALBUTEROL SULFATE 90 UG/1
2 AEROSOL, METERED RESPIRATORY (INHALATION) EVERY 4 HOURS PRN
Status: DISCONTINUED | OUTPATIENT
Start: 2017-04-20 | End: 2017-04-21 | Stop reason: HOSPADM

## 2017-04-20 RX ORDER — POLYETHYLENE GLYCOL 3350 17 G/17G
17 POWDER, FOR SOLUTION ORAL DAILY PRN
Status: DISCONTINUED | OUTPATIENT
Start: 2017-04-20 | End: 2017-04-21 | Stop reason: HOSPADM

## 2017-04-20 RX ORDER — TOPIRAMATE 50 MG/1
50 TABLET, FILM COATED ORAL DAILY
Status: DISCONTINUED | OUTPATIENT
Start: 2017-04-20 | End: 2017-04-21 | Stop reason: HOSPADM

## 2017-04-20 RX ORDER — SODIUM CHLORIDE, SODIUM LACTATE, POTASSIUM CHLORIDE, CALCIUM CHLORIDE 600; 310; 30; 20 MG/100ML; MG/100ML; MG/100ML; MG/100ML
INJECTION, SOLUTION INTRAVENOUS CONTINUOUS
Status: DISCONTINUED | OUTPATIENT
Start: 2017-04-20 | End: 2017-04-20 | Stop reason: HOSPADM

## 2017-04-20 RX ADMIN — TOPIRAMATE 50 MG: 50 TABLET, FILM COATED ORAL at 09:24

## 2017-04-20 RX ADMIN — DESVENLAFAXINE SUCCINATE 100 MG: 50 TABLET, EXTENDED RELEASE ORAL at 09:25

## 2017-04-20 RX ADMIN — LIDOCAINE HYDROCHLORIDE 100 MG: 20 INJECTION, SOLUTION INFILTRATION; PERINEURAL at 01:52

## 2017-04-20 RX ADMIN — SUCCINYLCHOLINE CHLORIDE 100 MG: 20 INJECTION, SOLUTION INTRAMUSCULAR; INTRAVENOUS at 01:52

## 2017-04-20 RX ADMIN — SODIUM CHLORIDE, POTASSIUM CHLORIDE, SODIUM LACTATE AND CALCIUM CHLORIDE: 600; 310; 30; 20 INJECTION, SOLUTION INTRAVENOUS at 02:30

## 2017-04-20 RX ADMIN — FENTANYL CITRATE 50 MCG: 50 INJECTION, SOLUTION INTRAMUSCULAR; INTRAVENOUS at 01:52

## 2017-04-20 RX ADMIN — CHOLECALCIFEROL CAP 125 MCG (5000 UNIT) 5000 UNITS: 125 CAP at 09:24

## 2017-04-20 RX ADMIN — FENTANYL CITRATE 50 MCG: 50 INJECTION INTRAMUSCULAR; INTRAVENOUS at 03:42

## 2017-04-20 RX ADMIN — PROPOFOL 20 MG: 10 INJECTION, EMULSION INTRAVENOUS at 02:05

## 2017-04-20 RX ADMIN — PROPOFOL 150 MG: 10 INJECTION, EMULSION INTRAVENOUS at 01:52

## 2017-04-20 RX ADMIN — DEXAMETHASONE SODIUM PHOSPHATE 4 MG: 4 INJECTION, SOLUTION INTRA-ARTICULAR; INTRALESIONAL; INTRAMUSCULAR; INTRAVENOUS; SOFT TISSUE at 02:08

## 2017-04-20 RX ADMIN — FENTANYL CITRATE 100 MCG: 50 INJECTION, SOLUTION INTRAMUSCULAR; INTRAVENOUS at 01:53

## 2017-04-20 RX ADMIN — FENTANYL CITRATE 50 MCG: 50 INJECTION INTRAMUSCULAR; INTRAVENOUS at 03:22

## 2017-04-20 RX ADMIN — TOPIRAMATE 100 MG: 50 TABLET, FILM COATED ORAL at 19:43

## 2017-04-20 RX ADMIN — ACETAMINOPHEN 650 MG: 325 TABLET, FILM COATED ORAL at 17:48

## 2017-04-20 RX ADMIN — ACETAMINOPHEN 650 MG: 325 TABLET, FILM COATED ORAL at 09:24

## 2017-04-20 RX ADMIN — IBUPROFEN 200 MG: 200 TABLET, FILM COATED ORAL at 19:42

## 2017-04-20 RX ADMIN — ONDANSETRON 4 MG: 2 INJECTION INTRAMUSCULAR; INTRAVENOUS at 03:16

## 2017-04-20 RX ADMIN — FENTANYL CITRATE 50 MCG: 50 INJECTION INTRAMUSCULAR; INTRAVENOUS at 02:55

## 2017-04-20 RX ADMIN — MIDAZOLAM HYDROCHLORIDE 2 MG: 1 INJECTION, SOLUTION INTRAMUSCULAR; INTRAVENOUS at 01:48

## 2017-04-20 RX ADMIN — FENTANYL CITRATE 50 MCG: 50 INJECTION INTRAMUSCULAR; INTRAVENOUS at 02:46

## 2017-04-20 RX ADMIN — ALBUTEROL SULFATE 2 PUFF: 90 AEROSOL, METERED RESPIRATORY (INHALATION) at 21:41

## 2017-04-20 RX ADMIN — ONDANSETRON 4 MG: 2 INJECTION INTRAMUSCULAR; INTRAVENOUS at 02:08

## 2017-04-20 RX ADMIN — FENTANYL CITRATE 50 MCG: 50 INJECTION INTRAMUSCULAR; INTRAVENOUS at 03:59

## 2017-04-20 RX ADMIN — SODIUM CHLORIDE, POTASSIUM CHLORIDE, SODIUM LACTATE AND CALCIUM CHLORIDE: 600; 310; 30; 20 INJECTION, SOLUTION INTRAVENOUS at 01:48

## 2017-04-20 ASSESSMENT — ACTIVITIES OF DAILY LIVING (ADL)
DRESS: INDEPENDENT
DRESS: STREET CLOTHES;INDEPENDENT
ORAL_HYGIENE: INDEPENDENT
GROOMING: INDEPENDENT
GROOMING: HANDWASHING;INDEPENDENT
ORAL_HYGIENE: INDEPENDENT
LAUNDRY: WITH SUPERVISION

## 2017-04-20 ASSESSMENT — PAIN DESCRIPTION - DESCRIPTORS
DESCRIPTORS: ACHING;SORE
DESCRIPTORS: SHARP

## 2017-04-20 NOTE — ANESTHESIA POSTPROCEDURE EVALUATION
Patient: Nevin Alvarado    Procedure(s):  EGD removal Foregin body - Wound Class: II-Clean Contaminated    Diagnosis:foreign bobdy   Diagnosis Additional Information: No value filed.    Anesthesia Type:  General, ETT, RSI    Note:  Anesthesia Post Evaluation    Patient location during evaluation: PACU and Bedside  Patient participation: Able to fully participate in evaluation  Level of consciousness: awake  Pain management: adequate  Airway patency: patent  Cardiovascular status: acceptable  Respiratory status: acceptable  Hydration status: acceptable  PONV: controlled     Anesthetic complications: None          Last vitals:  Vitals:    04/20/17 0245 04/20/17 0300 04/20/17 0315   BP: 127/70 126/67 116/62   Pulse:      Resp: 12 14 16   Temp:  36.7  C (98  F)    SpO2: 100% 100% 99%         Electronically Signed By: Gustavo Francis MD  April 20, 2017  3:52 AM

## 2017-04-20 NOTE — OP NOTE
[facility] Brief Operative Note    Procedure:  Upper endoscopy   Post-operative diagnosis:  Foreign body   Staff Physician:  Dr. Lokesh Paula   Fellow/Assistant(s):  Dr. Gonzalez    Specimens:  Please see final procedure note for further details.   Findings:  Foreign body in duodenum   Complications:  None.   Condition:  Stable   Recommendations  Diet:  Return to previous diet  PPI:  PPI po once daily x 1 month  Anti-coagulants/platelets:  N/A  Octreotide:  N/A  Discharge Planning:   Psych care

## 2017-04-20 NOTE — PROGRESS NOTES
Initially seen by OT on this date. Nevin   attended 1 of 3 OT groups today. Tentative about the activity, asked many questions before proceeding. Some casual conversation with others, though mostly quiet and focused on task. Will be given a written self-assessment upon increased group attendance. More observation needed to complete initial evaluation at this time.

## 2017-04-20 NOTE — ED PROVIDER NOTES
History     Chief Complaint   Patient presents with     Suicidal     SI with no plan     HPI  Nevin Alvarado is a 26 year old female with borderline personality disorder, PTSD who presents today for safety concerns.  She lives in an apartment with 24/7 support staff.  She was at her therapist's office today and shared that she was having suicidal thoughts and urges to cut.  She has not cut for 1 year.  She is currently under a mental health commitment.   The therapist recommended that she come here for evaluation.  She was seen at [facility] on the 16th for ingestion.  She said it was accidental but now says it was not.  She feels that she cannot go home and keep herself safe.  She cannot identify coping skills that she could use at home to keep herself safe.  She also says she ate iván pinz today in the bathroom.  She gets services through [facility] and also [facility].     I have reviewed the Medications, Allergies, Past Medical and Surgical History, and Social History in the Epic system.    Review of Systems   Psychiatric/Behavioral: Positive for dysphoric mood, self-injury and suicidal ideas. Negative for hallucinations. The patient is nervous/anxious.    All other systems reviewed and are negative.      Physical Exam   BP: 148/60  Pulse: 94  Temp: 99.7  F (37.6  C)  Resp: 16  Weight: 101.1 kg (222 lb 14.4 oz)  SpO2: 98 %  Physical Exam   Constitutional: She is oriented to person, place, and time. She appears well-developed and well-nourished. No distress.   HENT:   Head: Normocephalic and atraumatic.   Right Ear: External ear normal.   Left Ear: External ear normal.   Nose: Nose normal.   Eyes: EOM are normal.   Neck: Normal range of motion.   Cardiovascular: Normal rate, regular rhythm and normal heart sounds.    Pulmonary/Chest: Effort normal and breath sounds normal.   Abdominal: Soft.   Musculoskeletal: Normal range of motion.   Neurological: She is alert and oriented to person, place, and time.    Skin: Skin is warm and dry. She is not diaphoretic.   Psychiatric: Her speech is normal and behavior is normal. Her mood appears anxious. Cognition and memory are normal. She expresses impulsivity and inappropriate judgment. She exhibits a depressed mood. She expresses suicidal ideation.   Nursing note and vitals reviewed.      ED Course     ED Course     Procedures        Labs Ordered and Resulted from Time of ED Arrival Up to the Time of Departure from the ED   DRUG ABUSE SCREEN 6 CHEM DEP URINE ([facility])   HCG QUALITATIVE URINE   BASIC METABOLIC PANEL   CBC WITH PLATELETS DIFFERENTIAL   MAY SALINE LOCK IV     Results for orders placed or performed during the hospital encounter of 04/19/17 (from the past 24 hour(s))   Drug abuse screen 6 urine (tox)   Result Value Ref Range    Amphetamine Qual Urine  NEG     Negative   Cutoff for a negative amphetamine is 500 ng/mL or less.      Barbiturates Qual Urine  NEG     Negative   Cutoff for a negative barbiturate is 200 ng/mL or less.      Benzodiazepine Qual Urine  NEG     Negative   Cutoff for a negative benzodiazepine is 200 ng/mL or less.      Cannabinoids Qual Urine  NEG     Negative   Cutoff for a negative cannabinoid is 50 ng/mL or less.      Cocaine Qual Urine  NEG     Negative   Cutoff for a negative cocaine is 300 ng/mL or less.      Ethanol Qual Urine  NEG     Negative   Cutoff for a negative urine ethanol is 0.05 g/dL or less      Opiates Qualitative Urine  NEG     Negative   Cutoff for a negative opiate is 300 ng/mL or less.     HCG qualitative urine   Result Value Ref Range    HCG Qual Urine Negative NEG   KUB XR    Narrative    ABDOMEN ONE VIEW 4/19/2017 10:03 PM     HISTORY: Swallowed mickie pins. Evaluate placement.    COMPARISON: None.      Impression    IMPRESSION: There are two sets of mickie pins horizontally projecting  within the antrum of the stomach. T-shaped intrauterine device also  noted.    FLETCHER PACE MD         Assessments & Plan (with Medical  Decision Making)   The patient has hx of MDD and BPD and presents today having suicidal urges and urges to cut.  She cannot keep herself safe.  She is a voluntary admit to inpatient mental health for safety concerns.  She also states she ate iván pinz while here.  This is not the first time.  Abdominal xray was ordered after UPT negative.   Xrays show iván pinz in antrum of stomach.  Spoke to GI who feels these need to be removed today.  The patient will be sent to [facility] preop for foreign body removal in the OR and then will be able to return to [facility] inpatient psychiatry for admission to [facility].   This is a voluntary admit but if she changes her mind I would place her on a 72 hour hold.  Labs were ordered for procedure.  UPT negative. Transport hold was signed.  She should have a sitter until admitted to mental health to avoid ingestion of further objects.  I notified the inpatient mental health team of the plan.  The patient is npo for now until post procedure.     Serial xrays will be needed for iván pinz noted in kub today.     I have reviewed the nursing notes.    I have reviewed the findings, diagnosis, plan and need for follow up with the patient.    New Prescriptions    No medications on file       Final diagnoses:   Moderate episode of recurrent major depressive disorder (H)   Borderline personality disorder   Swallowed foreign body, initial encounter       4/19/2017   [facility], [place], EMERGENCY DEPARTMENT     Alyssa Menon MD  04/19/17 0491       Alyssa Menon MD  04/20/17 1271

## 2017-04-20 NOTE — ANESTHESIA PREPROCEDURE EVALUATION
Anesthesia Evaluation     . Pt has had prior anesthetic. Type: General (Grade 1 View)    No history of anesthetic complications          ROS/MED HX    ENT/Pulmonary:  - neg pulmonary ROS     Neurologic:  - neg neurologic ROS     Cardiovascular:  - neg cardiovascular ROS   (+) ----. : . . . :. . Previous cardiac testing date:results:date: results:ECG reviewed date:1/11/17 results:NSR date: results:          METS/Exercise Tolerance:     Hematologic:  - neg hematologic  ROS       Musculoskeletal:  - neg musculoskeletal ROS       GI/Hepatic: Comment: Swallowed two mickie pins which are in stomach per imaging        Renal/Genitourinary:         Endo:  - neg endo ROS       Psychiatric:     (+) psychiatric history anxiety, depression and other (comment) (Janice, psychosis, OCD, ADHD, borderline personality, PTSD, suicidal intent with recent overdose 4/17 (5 oxycodone); prior hx of 5 suicide attempts)      Infectious Disease:  - neg infectious disease ROS       Malignancy:      - no malignancy   Other:                     Physical Exam  Normal systems: cardiovascular, pulmonary and dental    Airway   Mallampati: I  TM distance: >3 FB  Neck ROM: full    Dental     Cardiovascular   Rhythm and rate: regular and normal      Pulmonary    breath sounds clear to auscultation                     Lab Results   Component Value Date    WBC 10.1 04/20/2017    WBC 11.5 (H) 03/12/2017    WBC 15.1 (H) 03/09/2017    HGB 12.9 04/20/2017    HGB 12.0 03/12/2017    HGB 11.8 03/09/2017    HCT 38.5 04/20/2017    HCT 36.8 03/12/2017    HCT 36.1 03/09/2017     04/20/2017     03/12/2017     03/09/2017     03/12/2017     03/09/2017     03/07/2017    POTASSIUM 3.6 03/12/2017    POTASSIUM 4.1 03/09/2017    POTASSIUM 3.7 03/07/2017    CHLORIDE 108 03/12/2017    CHLORIDE 111 (H) 03/09/2017    CHLORIDE 109 03/07/2017    CO2 28 03/12/2017    CO2 24 03/09/2017    CO2 22 03/07/2017    BUN 15 03/12/2017    BUN 15  03/09/2017    BUN 11 03/07/2017    CR 0.61 03/12/2017    CR 0.58 03/09/2017    CR 0.44 (L) 03/07/2017    GLC 94 03/12/2017     (H) 03/09/2017     (H) 03/07/2017    SED 18 08/20/2012    DD <0.3 03/28/2014    DD 0.5 11/05/2011    NTBNPI 22 03/28/2014    TROPI  01/28/2015     <0.015  The 99th percentile for upper reference range is 0.045 ug/L.  Troponin values in   the range of 0.045 - 0.120 ug/L may be associated with risks of adverse   clinical events.   Effective 7/30/2014, the reference range for this assay has changed to reflect   new instrumentation/methodology.      TROPI <0.012 03/28/2014    AST 15 03/07/2017    AST 83 (H) 01/16/2017    AST 18 12/26/2016    ALT 20 03/07/2017     (H) 01/16/2017    ALT 22 12/26/2016    ALKPHOS 81 03/07/2017    ALKPHOS 84 01/16/2017    ALKPHOS 90 12/26/2016    BILITOTAL 0.2 03/07/2017    BILITOTAL 0.1 (L) 01/16/2017    BILITOTAL 0.2 12/26/2016    INR 1.12 03/09/2017    INR 1.10 01/31/2015    INR 1.11 09/15/2014       ABDOMEN ONE VIEW 4/19/2017 10:03 PM      HISTORY: Swallowed mickie pins. Evaluate placement.     COMPARISON: None.         IMPRESSION: There are two sets of mickie pins horizontally projecting  within the antrum of the stomach. T-shaped intrauterine device also  noted.     FLETCHER PACE MD    Anesthesia Plan      History & Physical Review  History and physical reviewed and following examination; no interval change.    ASA Status:  2 emergent.    NPO Status:  Waived due to emergency (two mickie pins in stomach)    Plan for General, ETT and RSI with Intravenous and Propofol induction. Maintenance will be Inhalation and Balanced.    PONV prophylaxis:  Dexamethasone or Solumedrol and Ondansetron (or other 5HT-3)  Additional equipment: Videolaryngoscope    -Patient with right 22 peripheral IV  -Patient with known blood antibodies    Albertina Mora MD  [facility] 2 Anesthesia Resident  Pager [phone]       Postoperative Care  Postoperative pain management:  IV  analgesics.      Consents  Anesthetic plan, risks, benefits and alternatives discussed with:  Patient.  Use of blood products discussed: Yes.   Use of blood products discussed with Patient.  Consented to blood products.  .

## 2017-04-20 NOTE — PROGRESS NOTES
04/20/17 0546   Patient Belongings   Did you bring any home meds/supplements to the hospital?  Yes   Patient Belongings tote;cell phone/electronics;plastic bag;shoes   Disposition of Belongings plastic bag c purse c ,cell phone,fitbit,miscell. change,notepad,,clothing and shoes/tote c large notebook,clothing, and toiletries.   Belongings Search Yes   Clothing Search Yes   Second Staff Layne VERA R.N. and Randi mg [facility]   General Info Comment inhaler to safe/check #''s [id number]; 1 am. express,1 apple cd. and 3 visa cd's to safe.   ADMISSION:  I am responsible for any personal items that are not sent to the safe or pharmacy. [facility] is not responsible for loss, theft or damage of any property in my possession.    Patient Signature _____________________ Date/Time _____________________    Staff Signature _______________________ Date/Time _____________________    2nd Staff person, if patient is unable/unwilling to sign  ___________________________________ Date/Time _____________________  DISCHARGE:  All personal items have been returned to me.    Patient Signature _____________________ Date/Time _____________________    Staff Signature _______________________ Date/Time _____________________
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
## 2017-04-20 NOTE — CONSULTS
Internal Medicine Initial Visit    Nevin Alvarado MRN# [id number]   Age: 26 year old YOB: 1990   Date of Admission: 4/19/2017     Reason for consult: EGD s/p ingestion       Requesting physician Dr. Burgess      SUBJECTIVE   HPI:   Nevin Alvarado is a 26 year old female with history of borderline personality disorder admitted to station 22N for suicidal ideation.     Patient reportedly ingested 2 mickie pins yesterday, and is now s/p EGD with foreign body removal. Surgery was successful with complications. Currently reports a sore throat. She denies abdominal pain, constipation, diarrhea, and bleeding.    She is requesting something for sleep and her alivia bear. Discussed that she should bring these concerns to the psychiatrist.     Otherwise patient denies HA, nausea, vomiting, chest pain, shortness of breath, abdominal pain, constipation, diarrhea, swelling, and rashes.     Past Medical History:     Past Medical History:   Diagnosis Date     ADD (attention deficit disorder)      Anorexia nervosa with bulimia     history of; on Topamax     Anxiety      Borderline personality disorder      Depression      H/O self-harm      H/O swallowed foreign body     Recurrent issue     Lives in independent group home     due to debilitating mental illness     Migraine without aura     no known triggers; on Topamax bid and Imitrex PRN     Morbid obesity (H)      PTSD (post-traumatic stress disorder)      Rectal foreign body     Recurrent issue      Reviewed & updated in [facility].     Past Surgical History:      Past Surgical History:   Procedure Laterality Date     ESOPHAGOSCOPY, GASTROSCOPY, DUODENOSCOPY (EGD), COMBINED N/A 3/9/2017    Procedure: COMBINED ESOPHAGOSCOPY, GASTROSCOPY, DUODENOSCOPY (EGD), REMOVE FOREIGN BODY;  Surgeon: Avis Guzmán MD;  Location: [facility] OR     EXAM UNDER ANESTHESIA ANUS N/A 1/10/2017    Procedure: EXAM UNDER ANESTHESIA ANUS;  Surgeon: Annmarie Haynes  MD;  Location: [facility] OR     HC REMOVE FECAL IMPACTION OR FB W ANESTHESIA N/A 12/18/2016    Procedure: REMOVE FECAL IMPACTION/FOREIGN BODY UNDER ANESTHESIA;  Surgeon: Soham Cano MD;  Location: [facility] OR     KNEE SURGERY Right     removed a small tissue mass from knee     LAPAROSCOPIC ABLATION ENDOMETRIOSIS       LAPAROTOMY EXPLORATORY N/A 1/10/2017    Procedure: LAPAROTOMY EXPLORATORY;  Surgeon: Annmarie Haynes MD;  Location: [facility] OR     lymph nodes removed from neck; benign  age 7     MAMMOPLASTY REDUCTION Bilateral      RELEASE CARPAL TUNNEL Bilateral      SIGMOIDOSCOPY FLEXIBLE N/A 1/10/2017    Procedure: SIGMOIDOSCOPY FLEXIBLE;  Surgeon: Annmarie Haynes MD;  Location: [facility] OR      Reviewed & updated in Epic.     Medications prior to admission:     Prior to Admission Medications   Prescriptions Last Dose Informant Patient Reported? Taking?   Cholecalciferol (VITAMIN D3 PO) 4/19/2017 at Unknown time Self Yes Yes   Sig: Take 5,000 Units by mouth daily    Desvenlafaxine Succinate (PRISTIQ PO) 4/19/2017 at Unknown time Self Yes Yes   Sig: Take 100 mg by mouth daily   SUMATRIPTAN SUCCINATE PO Past Week at Unknown time Self Yes Yes   Sig: Take 50 mg by mouth once as needed for migraine   TOPIRAMATE PO 4/19/2017 at Unknown time Self Yes Yes   Sig: Take 50 mg by mouth in the morning and 100 mg by mouth in the evening   albuterol (ALBUTEROL) 108 (90 BASE) MCG/ACT Inhaler Past Week at Unknown time  No Yes   Sig: Inhale 2 puffs into the lungs every 4 hours as needed for shortness of breath / dyspnea   augmented betamethasone dipropionate (DIPROLENE-AF) 0.05 % ointment Past Month at Unknown time  No Yes   Sig: Apply to AA BID x 2-3 weeks then PRN only   guaiFENesin-codeine (ROBITUSSIN AC) 100-10 MG/5ML SOLN solution Past Month at Unknown time  No Yes   Sig: Take 5 mLs by mouth every 4 hours as needed for cough   ibuprofen (ADVIL/MOTRIN) 600 MG tablet Past Month at Unknown time  No Yes   Sig: Take 1 tablet  (600 mg) by mouth every 6 hours as needed for moderate pain   ondansetron (ZOFRAN ODT) 4 MG ODT tab Past Month at Unknown time  No Yes   Sig: Take 1 tablet (4 mg) by mouth every 6 hours as needed for nausea   oxyCODONE (ROXICODONE) 5 MG IR tablet Past Week at Unknown time  No Yes   Sig: Take 1 tablet (5 mg) by mouth every 4 hours as needed for pain   polyethylene glycol (MIRALAX/GLYCOLAX) powder Unknown at Unknown time  No No   Sig: Take 17 g by mouth daily as needed for constipation   senna (SENOKOT) 8.6 MG tablet Unknown at Unknown time  No No   Sig: Take 1 tablet by mouth 2 times daily as needed for constipation      Facility-Administered Medications: None         Allergies:     Allergies   Allergen Reactions     Augmentin Swelling     Blood-Group Specific Substance Other (See Comments)     Patient has an anti-Cw and non-specific antibodies. Blood product orders may be delayed. Draw one red top and two purple top tubes for all type/screen/crossmatch orders.     Hydrocodone Nausea and Vomiting     vomiting for days     Influenza Vaccines Other (See Comments)     Oseltamivir Hives     med stopped, PN: med stopped     Vicodin [Hydrocodone-Acetaminophen] Nausea and Vomiting     Cephalosporins Rash         Social History:   Tobacco Use: Denies  Alcohol Use: Denies  Illicit Drug Use: Denies  STI Testing: No concern     Family History:     Family History   Problem Relation Age of Onset     Type 2 Diabetes Maternal Grandmother      Type 2 Diabetes Paternal Grandmother      Breast Cancer Paternal Grandmother      CEREBROVASCULAR DISEASE Father 53     Myocardial Infarction No family hx of      Coronary Artery Disease Early Onset No family hx of      Ovarian Cancer No family hx of      Colon Cancer No family hx of         Reviewed & updated in Epic.     Review of Systems:   Ten point review of systems negative except as stated above in History of Present Illness.    OBJECTIVE   Physical Exam:   Blood pressure (!) 148/94,  "pulse 91, temperature 98.4  F (36.9  C), temperature source Oral, resp. rate 16, height 1.575 m (5' 2\"), weight 99.8 kg (220 lb), SpO2 97 %, not currently breastfeeding.  General: Alert, awake, NAD. Guarded.  HEENT: NC/AT. Anicteric sclera. Eyes symmetric and free of discharge bilaterally. Mucous membranes moist.  Cardiovascular: RRR, S1S2, no murmurs appreciated.  Lungs: Normal respiratory effort on RA. Lungs CTAB without wheezes, rales or rhonchi.  Abdomen: Soft, non-tender and nondistended with normoactive bowel sounds.  Extremities: Warm & well perfused. No peripheral edema.  Skin: No rashes, jaundice, or lesions.  Neurologic: A&O X 3. Answers questions appropriately. Moves all extremities symmetrically.     Laboratory Data:   CMP  Recent Labs  Lab 04/20/17  0030      POTASSIUM 3.6   CHLORIDE 110*   CO2 23   ANIONGAP 9   GLC 93   BUN 9   CR 0.55   GFRESTIMATED >90Non  GFR Calc   GFRESTBLACK >90African American GFR Calc   KELBY 8.9       CBC  Recent Labs  Lab 04/20/17  0030   WBC 10.1   RBC 4.36   HGB 12.9   HCT 38.5   MCV 88   MCH 29.6   MCHC 33.5   RDW 13.2          TSHNo lab results found in last 7 days.       Assessment and Plan/Recommendations:     Borderline Personality Disorder. Management per Psychiatry    FB Ingestion s/p EGD with removal. KUB XR showed 2 mickie pins in antrum. EGD with removal was successful without complications. Complains of sore throat. Discussed that this is often a symptom after EGD and may try OTC analgesics. Denies abdominal pain with benign exam. Discussed recommendations to start Omeprazole for 1 months s/p EGD, but patient adamantly refused.   - PRN Ibuprofen/Tylenol  - Will not start omeprazole per GI recs because pt refused  - No further imaging recommended    Mild Asthma. No acute concerns. Lungs CTAB. Continue PRN albuterol inhaler    Mild Constipation. Last BM yesterday. Continue Miralax and Colace PRN.    Currently, this patient is medically " stable and medicine will sign off. Please page the Internal Medicine job code pager for any intercurrent medical issues which arise. Thank you for the opportunity to be a part of this patient's care.    Ankit Claros PA-C  Hospitalist Service  [phone]
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
## 2017-04-20 NOTE — OR NURSING
Pt awake and oriented x4. Vitals stable. Pt is stable. Danna-psych tech at bedside with Pt. Pt would like to have her Fitbit on. Pt told that she would need to discuss it with her dr later today. Pt would like it on so she can keep track of her walking. Pt has been complaining of a sore throat. Hurts more when she coughs. Throat is sore/aching.

## 2017-04-20 NOTE — PROGRESS NOTES
"Initiated admission for a 22 y/o single  female direct admit from [facility] PACU via ambulance s/p EGD w/ removal of 2 mickie pins which pt. admittedly swallowed while in [facility] last evening d/t stress. .  Had KUB  followed by EGD. A & O on admission.  Cooperative till requested to relinquish her alivia bear.  Became emotional, loud, defiantly refusing same w/ hysterical crying.  Would not initially allow v/s's to be taken as she felt staff would take advantage and then remove the bear.  Borderline personality traits presenting thus interfering w/ pt's ability to cooperate and reason appropriately.  \"It ain't going to happen, I can't trust you or anybody\". I've been in a lot of hospitals and I can hurt myself w/ pens or pencils that you all leave just lying around\".  W/much lengthy and supportive intervention pt. did agree w/ placing bear in her  locker until she could talk to her Dr. Villareal feedback provided as appropriate. Unit/staff expectations discussed w/ pt. Pt. Rights provided w/ verbalized understanding. No obvious complications s/p EGD.  Intermittently requests pain meds, then immediately falls asleep again.  Ice chips offered, maintained at BS.  Admitting for safety r/t suicidality and SIB, further eval/tx. Presents w/ hx of MDD, PTSD, BPD, ADD, Anxiety, and Anorexia /Bulemia. SIO in continuous progress.  Safe, therapeutic environment provided.      "

## 2017-04-20 NOTE — ED NOTES
"[facility] requested for transfer to [place] OR.  ETA 45\".  Charge RN working on sitter.  "
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
## 2017-04-20 NOTE — PLAN OF CARE
Problem: General Plan of Care (Inpatient Behavioral)  Goal: Individualization/Patient Specific Goal (IP Behavioral)  The patient and/or their representative will achieve their patient-specific goals related to the plan of care.    The patient-specific goals include:      Patient identified the following reasons for hospitalization:  Self harm  Depression/anxiety     Patient identified the follow goals for discharge:  Medication management  Get more tools to deal with depression/anxiety

## 2017-04-20 NOTE — ANESTHESIA CARE TRANSFER NOTE
Patient: Nevin Alvarado    Procedure(s):  EGD removal Foregin body - Wound Class: II-Clean Contaminated    Diagnosis: foreign bobdy   Diagnosis Additional Information: No value filed.    Anesthesia Type:   General, ETT, RSI     Note:  Airway :Face Mask  Patient transferred to:PACU  Comments: Stable.  Report given.        Vitals: (Last set prior to Anesthesia Care Transfer)    CRNA VITALS  4/20/2017 0156 - 4/20/2017 0234      4/20/2017             Pulse: 92    Ht Rate: 95    SpO2: 98 %                Electronically Signed By: Albertina Mora MD  April 20, 2017  2:34 AM

## 2017-04-20 NOTE — PROGRESS NOTES
Pt present in milieu, ate meals, did not attend groups, tearful at times and wanting to leave. Pt appeared to brighten during lunch, talked a little with this writer and make some jokes and laughed a little. Pt made phone calls to her , [employer] worker.     04/20/17 1200   Behavioral Health   Thinking intact   Orientation person: oriented;place: oriented;date: oriented   Memory baseline memory   Insight poor   Judgement impaired   Eye Contact at examiner   Affect blunted, flat;sad;tense;irritable   Mood depressed;shame/guilt;anxious;irritable   Physical Appearance/Attire attire appropriate to age and situation   Hygiene well groomed   Suicidality thoughts only   Self Injury thoughts only   Activity withdrawn   Speech clear;coherent   Psychomotor / Gait balanced;steady   Psycho Education   Type of Intervention 1:1 intervention   Response participates with encouragement   Hours 0.5   Treatment Detail check-in   Activities of Daily Living   Hygiene/Grooming handwashing;independent   Oral Hygiene independent   Dress street clothes;independent   Room Organization independent

## 2017-04-20 NOTE — OR NURSING
[facility] here to take Pt back to [facility]. Pt stable.

## 2017-04-20 NOTE — PLAN OF CARE
Problem: General Plan of Care (Inpatient Behavioral)  Goal: Team Discussion  Team Plan:   BEHAVIORAL TEAM DISCUSSION     Continued Stay Criteria/Rationale: SI  Plan: Monitor, assess and stabilize  Participants: Dr. Cabrales; Malissa Lundberg, Resident MD; Rafita Batista, Resident MD; Niru Camacho RN; RICH Sorensen; Yajaira SAM, Medical Student  Summary/Recommendation: The plan is to assess and patient for mental health and medication needs.  The patient will be prescribed medications to treat the identified symptoms.  Upon discharge the patient will be referred to services as appropriate based on the assessment.  Medical/Physical: Deferred to medicine  Progress: Initial     Illness Management Recovery model: Personal Plan of Care     Patient completed Personal Plan of Care, identifying reasons for hospitalization and goals for discharge. Form reviewed in team meeting and signed by patient, physician, writer/CTC and, RN. Form will be scanned into EPIC.

## 2017-04-21 VITALS
SYSTOLIC BLOOD PRESSURE: 148 MMHG | DIASTOLIC BLOOD PRESSURE: 94 MMHG | BODY MASS INDEX: 40.48 KG/M2 | RESPIRATION RATE: 14 BRPM | HEART RATE: 91 BPM | OXYGEN SATURATION: 97 % | WEIGHT: 220 LBS | HEIGHT: 62 IN | TEMPERATURE: 98 F

## 2017-04-21 PROCEDURE — A9270 NON-COVERED ITEM OR SERVICE: HCPCS | Mod: GY | Performed by: PSYCHIATRY & NEUROLOGY

## 2017-04-21 PROCEDURE — 90853 GROUP PSYCHOTHERAPY: CPT

## 2017-04-21 PROCEDURE — 99238 HOSP IP/OBS DSCHRG MGMT 30/<: CPT | Mod: GC | Performed by: PSYCHIATRY & NEUROLOGY

## 2017-04-21 PROCEDURE — 25000132 ZZH RX MED GY IP 250 OP 250 PS 637: Mod: GY | Performed by: PSYCHIATRY & NEUROLOGY

## 2017-04-21 PROCEDURE — 97150 GROUP THERAPEUTIC PROCEDURES: CPT | Mod: GO

## 2017-04-21 RX ADMIN — DESVENLAFAXINE SUCCINATE 100 MG: 50 TABLET, EXTENDED RELEASE ORAL at 08:16

## 2017-04-21 RX ADMIN — TOPIRAMATE 50 MG: 50 TABLET, FILM COATED ORAL at 08:14

## 2017-04-21 RX ADMIN — ACETAMINOPHEN 650 MG: 325 TABLET, FILM COATED ORAL at 10:04

## 2017-04-21 RX ADMIN — CHOLECALCIFEROL CAP 125 MCG (5000 UNIT) 5000 UNITS: 125 CAP at 08:14

## 2017-04-21 ASSESSMENT — ACTIVITIES OF DAILY LIVING (ADL)
BATHING: 0-->INDEPENDENT
ORAL_HYGIENE: INDEPENDENT
RETIRED_EATING: 0-->INDEPENDENT
AMBULATION: 0-->INDEPENDENT
FALL_HISTORY_WITHIN_LAST_SIX_MONTHS: NO
GROOMING: INDEPENDENT
RETIRED_COMMUNICATION: 0-->UNDERSTANDS/COMMUNICATES WITHOUT DIFFICULTY
SWALLOWING: 0-->SWALLOWS FOODS/LIQUIDS WITHOUT DIFFICULTY
TRANSFERRING: 0-->INDEPENDENT
TOILETING: 0-->INDEPENDENT
TRANSFERRING: 0-->INDEPENDENT
SWALLOWING: 0-->SWALLOWS FOODS/LIQUIDS WITHOUT DIFFICULTY
COGNITION: 0 - NO COGNITION ISSUES REPORTED
AMBULATION: 0-->INDEPENDENT
BATHING: 0-->INDEPENDENT
LAUNDRY: WITH SUPERVISION
DRESS: STREET CLOTHES;INDEPENDENT
DRESS: INDEPENDENT
EATING: 0-->INDEPENDENT
ORAL_HYGIENE: INDEPENDENT
DRESS: 0-->INDEPENDENT
DRESS: 0-->INDEPENDENT
LAUNDRY: WITH SUPERVISION
COMMUNICATION: 0-->UNDERSTANDS/COMMUNICATES WITHOUT DIFFICULTY
TOILETING: 0-->INDEPENDENT
GROOMING: SHOWER;INDEPENDENT

## 2017-04-21 NOTE — PROGRESS NOTES
Pt present in milieu, did laundry, spoke to [employer] worker, participated appropriately in OT group. Pt is pleasant and conversational, full range affect, appears willing to follow treatment recommendations. Pt will work on creating a safety plan today. Pt verbalized that she would talk to this writer if she felt the urge to self-harm.      04/21/17 1200   Behavioral Health   Hallucinations denies / not responding to hallucinations   Thinking intact   Orientation person: oriented;place: oriented   Memory baseline memory   Insight insight appropriate to situation   Judgement impaired   Eye Contact at examiner   Affect full range affect   Mood mood is calm;depressed   Physical Appearance/Attire attire appropriate to age and situation   Hygiene well groomed   Suicidality other (see comments)  (denies)   Self Injury thoughts only   Activity other (see comment)  (present in milieu)   Speech clear;coherent   Medication Sensitivity no stated side effects   Psychomotor / Gait balanced;steady   Sleep/Rest/Relaxation   Number of hours napping (35 minutes)   Psycho Education   Type of Intervention 1:1 intervention   Response participates, initiates socially appropriate   Hours 0.5   Treatment Detail check-in   Activities of Daily Living   Hygiene/Grooming shower;independent   Oral Hygiene independent   Dress street clothes;independent   Laundry with supervision   Room Organization independent

## 2017-04-21 NOTE — DISCHARGE INSTRUCTIONS
Behavioral Discharge Planning and Instructions      Summary:  You were admitted on 4/19/2017  for Suicidal Ideations and Self Injurious Behaviors.  You were treated by Dr. Kamar Cabrales MD and discharged on 4/21/2017 from [facility]. You are returning home with a treatment plan set in place and are encouraged to reach out to the supportive staff at home to help promote your continued safety.     Main Diagnosis: Major Depressive Disorder     Health Care Follow-up Appointments:     [facility]- Monday May 8th   [facility] [address] [place], [place] [phone] Fax: [phone]    Attend all scheduled appointments with your outpatient providers. Call at least 24 hours in advance if you need to reschedule an appointment to ensure continued access to your outpatient providers.   Major Treatments, Procedures and Findings:  You were provided with: a psychiatric assessment, assessed for medical stability, medication evaluation and/or management, group therapy, milieu management and medical interventions    Symptoms to Report: losing more sleep, mood getting worse or thoughts of suicide    Early warning signs can include: increased depression or anxiety increased thoughts or behaviors of suicide or self-harm  increased unusual thinking, such as paranoia or hearing voices    Safety and Wellness:  Take all medicines as directed.  Make no changes unless your doctor suggests them.      Follow treatment recommendations.  Refrain from alcohol and non-prescribed drugs.  If there is a concern for safety, call [phone].    Resources:   Crisis Intervention: 959.648.3528 or 060-952-1546 (TTY: 160.551.6522).  Call anytime for help.  National [place] on Mental Illness (www.[place].org): 518.155.8709 or 815-771-1283.  Suicide Awareness Voices of Education (SAVE) (www.save.org): 533-718-WLCT (9807)  National Suicide Prevention Line (www.mentalhealthmn.org): 755-285-LIZG (3364)  Mental Health Consumer/Survivor  "[facility] [place] (www.[facility]net): 809-927-8982 or 889-082-1457  Self- Management and Recovery Training., SMART-- Toll free: 668.330.4766  www.250ok  [facility] Crisis Response 141-366-5213  Text 4 Life: txt \"LIFE\" to 18110 for immediate support and crisis intervention  Crisis text line: Text \"START\" to 213-608. Free, confidential, 24/7.  Crisis Intervention: 332.866.8346 or 282-181-0912. Call anytime for help.     The treatment team has appreciated the opportunity to work with you.     If you have any questions or concerns our unit number is [phone]  You may be receiving a follow-up phone call within the next three days from a representative from behavioral health.          "

## 2017-04-21 NOTE — PROGRESS NOTES
04/20/17 2142   Behavioral Health   Hallucinations denies / not responding to hallucinations   Thinking poor concentration   Orientation person: oriented;place: oriented;date: oriented;time: oriented   Memory baseline memory   Insight poor   Judgement impaired   Eye Contact at examiner   Affect blunted, flat;sad   Mood depressed;mood is calm   Physical Appearance/Attire attire appropriate to age and situation   Hygiene well groomed   Suicidality thoughts only   Self Injury thoughts only   Activity other (see comment)  (visible in milieu, isolative to seld)   Speech clear;coherent   Medication Sensitivity no stated side effects   Psychomotor / Gait slow;balanced   Psycho Education   Type of Intervention 1:1 intervention   Response participates with encouragement   Hours 0.5   Activities of Daily Living   Hygiene/Grooming independent   Oral Hygiene independent   Dress independent   Laundry with supervision   Room Organization independent   pt visible in milieu. Pt utilizing coping strategies; coloring, weighted blanket, tea and watching TV. Pt showered. Pt has thoughts of Si and SIB but no actions. Pt had no visitors. Pt trying to reach her [employer] worker as she stated she is suppose to have a meeting with her in the morning. Pt calm with flat affect.

## 2017-04-21 NOTE — DISCHARGE SUMMARY
----------------------------------------------------------------------------------------------------------  [facility], [place]   Discharge Summary      Nevin Alvarado MRN# [id number]   Age: 26 year old YOB: 1990     Date of Admission:  4/19/2017  Date of Discharge:  4/21/2017  Admitting Physician:  Kamar Cabrales MD  Discharge Physician:  Kamar Cabrales MD         Event Leading to Hospitalization:     Nevin Alvarado is a 26 year old female with a significant past psychiatric history of depression, PTSD, borderline personality disorder and 5 suicide attempts, who presents with one month of worsening depression, suicidal intent and desire to self-harm. Most recent overdose attempt on 4/17/17 with 5 oxycodone. Her most recent hospitalization was at [facility] 7 months ago.       See Admission note by Kamar Cabrales MD on 04/19/17 for additional details.          Diagnoses:     Recurrent severe MDD without use disorder, borderline personality disorder, PTSD    Nevin presented feeling sad, hopeless and helpless, 3 days after an OD suicide attempt, and she self-injured by swallowing two mickie pins in the [facility] waiting area.  Stressors include her grandmother diagnosed with terminal breast cancer, starting to work again after two years not working, and a transitional change in [employer] workers.          Labs:     ANTIPSYCHOTIC LABS   [glu, A1C, lipids (focus LDL), liver enzymes, WBC, ANEU, Hgb, plts]    q12 mo  Recent Labs   Lab Test  04/20/17   0030  03/12/17   0755   GLC  93  94     No lab results found.  Recent Labs   Lab Test  03/07/17   0256  01/16/17   0246   AST  15  83*   ALT  20  102*   ALKPHOS  81  84     Recent Labs   Lab Test  04/20/17   0030  03/12/17   0755   WBC  10.1  11.5*   ANEU  7.3  7.9   HGB  12.9  12.0   PLT  299  241            Consults:     Consultation during this admission received from internal medicine.         Hospital Course:      Nevin Alvarado was admitted to [facility] with attending Kamar Cabrales MD as a voluntary patient and under an ongoing civil commitment. The patient was placed under status 15 (15 minute checks) to ensure patient safety. CBC, BMP and utox obtained.    Hospital course: Nevin Alvarado was admitted to station 22 as a voluntary patient who is committed to [facility]. She was admitted for suicidal intent and continued on PTA medications Pristiq and Topomax. Olanzapine PRN was added to her medication regimen. On 04/21, patient reported she feels safe and able to provide safety plan for her chronic suicidal thoughts. She feels she is ready to go home. Team obtained collateral information from her [employer] worker as well as  and they agreed considering her chronic suicidality. She can be discharged as long as she can seek help when the thoughts are overpowering her. The group home also offered to have more frequent visits to her room for the next few days. At this time, team believes that given her chronic suicidality, borderline personality disorder diagnosis, and the negative impact of prolonged hospitalization on her overall mental health, she can be discharged.     Medical course: Patient swallowed two mickie pins while in the [facility]. They were successfully removed via upper endoscopy without complications on 04/20/17. Advanced diet as tolerated    The patient's symptoms of suicidality improved. She is more euthymic and her thought process is more organized and linear.    Nevin Alvarado was discharged to group home. At the time of discharge Nevin Alvarado was determined to not be a danger to herself or others.    Today Nevin Alvarado reports passive suicidal thoughts that she doesn't feel she will act upon. In addition, Nevin Alvarado has notable risk factors for self-harm, including age, single status, anxiety, recent loss of her [employer] worker and previous suicide  attempts. However, risk is mitigated by sobriety, ability to volunteer a safety plan and history of seeking help when needed. Therefore, based on all available evidence including the factors cited above, Nevin Alvarado does not appear to be at imminent risk for self-harm, and is appropriate for outpatient level of care.     This document serves as a transfer of care to Nevin Alvarado's outpatient providers.         Discharge Medications:        Review of your medicines      CONTINUE these medicines which have NOT CHANGED       Dose / Directions    albuterol 108 (90 BASE) MCG/ACT Inhaler   Commonly known as:  albuterol        Dose:  2 puff   Inhale 2 puffs into the lungs every 4 hours as needed for shortness of breath / dyspnea   Quantity:  1 Inhaler   Refills:  0       ibuprofen 600 MG tablet   Commonly known as:  ADVIL/MOTRIN        Dose:  600 mg   Take 1 tablet (600 mg) by mouth every 6 hours as needed for moderate pain   Quantity:  30 tablet   Refills:  1       polyethylene glycol powder   Commonly known as:  MIRALAX/GLYCOLAX   Used for:  Rectal foreign body, subsequent encounter        Dose:  17 g   Take 17 g by mouth daily as needed for constipation   Quantity:  510 g   Refills:  3       PRISTIQ PO        Dose:  100 mg   Take 100 mg by mouth daily   Refills:  0       senna 8.6 MG tablet   Commonly known as:  SENOKOT   Used for:  Acute post-operative pain        Dose:  1 tablet   Take 1 tablet by mouth 2 times daily as needed for constipation   Quantity:  60 tablet   Refills:  0       SUMATRIPTAN SUCCINATE PO        Dose:  50 mg   Take 50 mg by mouth once as needed for migraine   Refills:  0       TOPIRAMATE PO        Take 50 mg by mouth in the morning and 100 mg by mouth in the evening   Refills:  0       VITAMIN D3 PO        Dose:  5000 Units   Take 5,000 Units by mouth daily   Refills:  0         STOP taking          augmented betamethasone dipropionate 0.05 % ointment   Commonly known as:   DIPROLENE-AF           guaiFENesin-codeine 100-10 MG/5ML Soln solution   Commonly known as:  ROBITUSSIN AC           ondansetron 4 MG ODT tab   Commonly known as:  ZOFRAN ODT           oxyCODONE 5 MG IR tablet   Commonly known as:  ROXICODONE                    Psychiatric Examination:     Appearance: awake, alert and adequately groomed  Attitude: guarded, cooperative  Eye Contact: good  Mood:  anxious  Affect: mood congruent  Speech: clear, coherent  Psychomotor Behavior: intact station, gait and muscle tone  Thought Process: linear and occasionally illogical  Associations: no loose associations  Thought Content: passive suicidal ideation present and thoughts of self-harm, which are remained the same  Insight: limited  Judgment: limited  Oriented to: time, person, and place  Attention Span and Concentration: fair  Recent and Remote Memory: intact  Language: Able to name objects  Fund of Knowledge: low-normal  Muscle Strength and Tone: normal  Gait and Station: normal         Discharge Plan:     [facility]- Monday May 8th   [facility] [address] [place], [place] [phone] Fax: [phone]  Inpatient team suggested ttrial of lamotrigine to target her emotional dysregulation and impulsivity, but patient left the hospital before a final decision was made to start this.  Pt seen and discussed with my attending, Dr. Cabrales.   Richie Bowers MD  PGY1 Psychiatry Resident  Psychiatry Attending Attestation:  This patient has been seen and evaluated by me, Kamar Cabrales M.D. The hospital care and discharge plan were formulated in team discussion with the patient, psychiatry resident and/or medical student, the cooper Clinical Treatment Coordinator, and RN. I reviewed, edited and agree with the findings and plan in this discharge summary. Total time on discharge date involved with planning and face-to-face discussion with the patient was 25 minutes. Additions and amendments bolded.    It  was our pleasure and privilege to participate in the care of this patient. Please contact any of the team for any questions about the above information.     Luke Cabrales M.D.   of Psychiatry  [facility] Psych[facility]  Email [email or web address]  Phone [phone]

## 2017-04-21 NOTE — PLAN OF CARE
Problem: Depressive Symptoms  Goal: Depressive Symptoms  Signs and symptoms of listed problems will be absent or manageable.   -pt will remain safe without any self harm during hospitalization.  -pt will have decreased thoughts of self harm and or suicidal ideation.  -pt will report improved sleep.  -pt will have adequate daily oral intake.  -pt will have improvement in self care and person hygiene.  -pt will spend time out of their room.  -pt will express decrease feelings of hopelessness.  Outcome: Adequate for Discharge Date Met:  04/21/17  Nevin discharged 1600 via taxi to assisted living program-denies thoughts of SI/SIB-reviewed medication regimen and outpt tx plans and verbalized understanding-

## 2017-04-25 ENCOUNTER — TELEPHONE (OUTPATIENT)
Dept: NURSING | Facility: CLINIC | Age: 27
End: 2017-04-25

## 2017-04-25 NOTE — TELEPHONE ENCOUNTER
Supported apartment program, [employer] calling.  They set up and administer pt's meds.  They have 2 different script for Zofran and would just like clarification on what she should take and when.  Looks like both scripts came from hospital.  One for 4mg and one for 8mg.  Not on pt's current med list.  If she is not suppose to be on, can you please fax order saying ok to dc?  Or call and let them know how to take what she has.         Kayla [phone], or fax [phone]

## 2017-04-30 ENCOUNTER — TELEPHONE (OUTPATIENT)
Dept: NURSING | Facility: CLINIC | Age: 27
End: 2017-04-30

## 2017-04-30 ENCOUNTER — HOSPITAL ENCOUNTER (EMERGENCY)
Facility: CLINIC | Age: 27
Discharge: HOME | End: 2017-05-01
Attending: EMERGENCY MEDICINE | Admitting: EMERGENCY MEDICINE
Payer: MEDICARE

## 2017-04-30 DIAGNOSIS — R51.9 ACUTE NONINTRACTABLE HEADACHE, UNSPECIFIED HEADACHE TYPE: ICD-10-CM

## 2017-04-30 DIAGNOSIS — H92.02 OTALGIA, LEFT EAR: ICD-10-CM

## 2017-04-30 DIAGNOSIS — R20.2 FACIAL PARESTHESIA: ICD-10-CM

## 2017-04-30 DIAGNOSIS — H02.402 PTOSIS, LEFT: ICD-10-CM

## 2017-04-30 PROCEDURE — 96376 TX/PRO/DX INJ SAME DRUG ADON: CPT

## 2017-04-30 PROCEDURE — 99285 EMERGENCY DEPT VISIT HI MDM: CPT | Mod: 25

## 2017-04-30 PROCEDURE — 86618 LYME DISEASE ANTIBODY: CPT | Performed by: EMERGENCY MEDICINE

## 2017-04-30 PROCEDURE — 96375 TX/PRO/DX INJ NEW DRUG ADDON: CPT

## 2017-04-30 PROCEDURE — 96374 THER/PROPH/DIAG INJ IV PUSH: CPT | Mod: 59

## 2017-04-30 PROCEDURE — 84703 CHORIONIC GONADOTROPIN ASSAY: CPT | Performed by: EMERGENCY MEDICINE

## 2017-04-30 RX ORDER — LIDOCAINE 40 MG/G
CREAM TOPICAL
Status: DISCONTINUED | OUTPATIENT
Start: 2017-04-30 | End: 2017-05-01 | Stop reason: HOSPADM

## 2017-04-30 ASSESSMENT — ENCOUNTER SYMPTOMS
CHILLS: 0
LIGHT-HEADEDNESS: 1
WEAKNESS: 0
NUMBNESS: 0
NAUSEA: 1
HEADACHES: 1
NECK STIFFNESS: 0
FEVER: 0
ABDOMINAL PAIN: 0
NECK PAIN: 0
FACIAL ASYMMETRY: 1
SHORTNESS OF BREATH: 0
VOMITING: 0

## 2017-04-30 NOTE — ED AVS SNAPSHOT
[facility] Emergency Department    [address]    [facility] [address]    Phone:  [phone]    Fax:  [phone]                                       Nevin Alvarado   MRN: [id number]    Department:  [facility] Emergency Department   Date of Visit:  4/30/2017           After Visit Summary Signature Page     I have received my discharge instructions, and my questions have been answered. I have discussed any challenges I see with this plan with the nurse or doctor.    ..........................................................................................................................................  Patient/Patient Representative Signature      ..........................................................................................................................................  Patient Representative Print Name and Relationship to Patient    ..................................................               ................................................  Date                                            Time    ..........................................................................................................................................  Reviewed by Signature/Title    ...................................................              ..............................................  Date                                                            Time

## 2017-04-30 NOTE — ED AVS SNAPSHOT
[facility] Emergency Department    [address]    [facility] [address]    Phone:  [phone]    Fax:  [phone]                                       Nevin Alvarado   MRN: [id number]    Department:  [facility] Emergency Department   Date of Visit:  4/30/2017           Patient Information     Date Of Birth          1990        Your diagnoses for this visit were:     Acute nonintractable headache, unspecified headache type     Otalgia, left ear     Ptosis, left     Facial paresthesia        You were seen by Kathy Barrett MD.      Follow-up Information     Follow up with Sandra Ramos MD.    Specialty:  Internal Medicine    Why:  within 3 days    Contact information:    [facility]  [address] [facility] [address]  [phone]          Follow up with ENT (ear, nose, throat specialist).        Follow up with [facility] Emergency Department.    Specialty:  EMERGENCY MEDICINE    Why:  As needed, If symptoms worsen    Contact information:    [address]  [facility] [id number] [phone]        Discharge Instructions       Discharge Instructions  Headache    You were seen today for a headache. Headaches may be caused by many different things such as muscle tension, sinus inflammation, anxiety and stress, having too little sleep, too much alcohol, some medical conditions or injury. You may have a migraine, which is caused by changes in the blood vessels in your head.  At this time your doctor does not find that your headache is a sign of anything dangerous or life-threatening.  However, sometimes the signs of serious illness do not show up right away.  If you have new or worse symptoms, you may need to be seen again in the emergency department or by your primary doctor.      Return to the Emergency Department if:    You get a fever of 101 F or higher.    Your headache gets much worse.    You get  a stiff neck with your headache.    You get a new headache that is different or worse than headaches you have had before.    You are vomiting and can t keep food or water down.    You have blurry or double vision or other problems with your eyes.    You have a new weakness on one side of your body.    You have difficulty with balance which is new.    You or your family thinks you are confused.    You have a seizure or convulsion.    What can I do to help myself?    Pain medications - You may take a pain medication such as Tylenol  (acetaminophen), Advil , Nuprin  (ibuprofen) or Aleve  (naproxen).  If you have been given a narcotic such as Vicodin  (hydrocodone with acetaminophen), Percocet  (oxycodone with acetaminophen), codeine, or a muscle relaxant such as Flexeril  (cyclobenzaprine) or Soma  (carisoprodol), do not drive for four hours after you have taken it. If the narcotic contains Tylenol  (acetaminophen), do not take Tylenol  with it. All narcotics will cause constipation, so eat a high fiber diet.        Take a pain reliever as soon as you notice symptoms.  Starting medications as soon as you start to have symptoms may lessen the amount of pain you have.    Relaxing in a quiet, dark room may help.    Get enough sleep and eat meals regularly.    Schedule an appointment with your primary physician as instructed, or at least within 1 week.    You may need to watch for certain foods or other things which may trigger your headaches.  Keeping a journal of your headaches and possible triggers may help you and your primary doctor to identify things which you should avoid which may be causing your headaches.  If you were given a prescription for medicine here today, be sure to read all of the information (including the package insert) that comes with your prescription.  This will include important information about the medicine, its side effects, and any warnings that you need to know about.  The pharmacist who  fills the prescription can provide more information and answer questions you may have about the medicine.  If you have questions or concerns that the pharmacist cannot address, please call or return to the Emergency Department.     Remember that you can always come back to the Emergency Department if you are not able to see your regular doctor in the amount of time listed above, if you get any new symptoms, or if there is anything that worries you.        Discharge References/Attachments     PAIN, ACUTE, UNCERTAIN CAUSE (ENGLISH)      Future Appointments        Provider Department Dept Phone Center    5/19/2017 10:15 AM Carli Chiang PA-C [facility] [phone]       24 Hour Appointment Hotline       To make an appointment at any [facility] call [phone] ([phone]). If you don't have a family doctor or clinic, we will help you find one. [facility] are conveniently located to serve the needs of you and your family.             Review of your medicines      START taking        Dose / Directions Last dose taken    traMADol 50 MG tablet   Commonly known as:  ULTRAM   Dose:   mg   Quantity:  15 tablet        Take 1-2 tablets ( mg) by mouth every 6 hours as needed for pain   Refills:  0          Our records show that you are taking the medicines listed below. If these are incorrect, please call your family doctor or clinic.        Dose / Directions Last dose taken    albuterol 108 (90 BASE) MCG/ACT Inhaler   Commonly known as:  albuterol   Dose:  2 puff   Quantity:  1 Inhaler        Inhale 2 puffs into the lungs every 4 hours as needed for shortness of breath / dyspnea   Refills:  0        ibuprofen 600 MG tablet   Commonly known as:  ADVIL/MOTRIN   Dose:  600 mg   Quantity:  30 tablet        Take 1 tablet (600 mg) by mouth every 6 hours as needed for moderate pain   Refills:  0        polyethylene glycol powder   Commonly known as:  MIRALAX/GLYCOLAX    Dose:  17 g   Quantity:  510 g        Take 17 g by mouth daily as needed for constipation   Refills:  3        PRISTIQ PO   Dose:  100 mg        Take 100 mg by mouth daily   Refills:  0        senna 8.6 MG tablet   Commonly known as:  SENOKOT   Dose:  1 tablet   Quantity:  60 tablet        Take 1 tablet by mouth 2 times daily as needed for constipation   Refills:  0        SUMATRIPTAN SUCCINATE PO   Dose:  50 mg        Take 50 mg by mouth once as needed for migraine   Refills:  0        TOPIRAMATE PO        Take 50 mg by mouth in the morning and 100 mg by mouth in the evening   Refills:  0        VITAMIN D3 PO   Dose:  5000 Units        Take 5,000 Units by mouth daily   Refills:  0                Prescriptions were sent or printed at these locations (2 Prescriptions)                   Other Prescriptions                Printed at Department/Unit printer (2 of 2)         traMADol (ULTRAM) 50 MG tablet               ibuprofen (ADVIL/MOTRIN) 600 MG tablet                Procedures and tests performed during your visit     Abdomen XR 1 vw    Basic metabolic panel    CBC with platelets differential    HCG QUALitative pregnancy (blood)    Head MRA w/o contrast - STROKE PROTOCOL    Lyme Disease [place] with reflex to WB Serum    MR Brain w/o & w Contrast    Neck MRA w & w/o contrast - STROKE PROTOCOL    XR Chest 1 View      Orders Needing Specimen Collection     None      Pending Results     Date and Time Order Name Status Description    4/30/2017 2320 Neck MRA w & w/o contrast - STROKE PROTOCOL In process     4/30/2017 2320 MR Brain w/o & w Contrast In process     4/30/2017 2320 Head MRA w/o contrast - STROKE PROTOCOL In process     4/30/2017 2320 Lyme Disease [place] with reflex to WB Serum In process             Pending Culture Results     No orders found for last 3 day(s).            Test Results From Your Hospital Stay        5/1/2017 12:15 AM      Component Results     Component Value Ref Range & Units Status    WBC 9.8  4.0 - 11.0 10e9/L Final    RBC Count 3.92 3.8 - 5.2 10e12/L Final    Hemoglobin 11.6 (L) 11.7 - 15.7 g/dL Final    Hematocrit 35.3 35.0 - 47.0 % Final    MCV 90 78 - 100 fl Final    MCH 29.6 26.5 - 33.0 pg Final    MCHC 32.9 31.5 - 36.5 g/dL Final    RDW 13.5 10.0 - 15.0 % Final    Platelet Count 270 150 - 450 10e9/L Final    Diff Method Automated Method  Final    % Neutrophils 65.5 % Final    % Lymphocytes 24.1 % Final    % Monocytes 6.9 % Final    % Eosinophils 2.8 % Final    % Basophils 0.4 % Final    % Immature Granulocytes 0.3 % Final    Nucleated RBCs 0 0 /100 Final    Absolute Neutrophil 6.4 1.6 - 8.3 10e9/L Final    Absolute Lymphocytes 2.4 0.8 - 5.3 10e9/L Final    Absolute Monocytes 0.7 0.0 - 1.3 10e9/L Final    Absolute Eosinophils 0.3 0.0 - 0.7 10e9/L Final    Absolute Basophils 0.0 0.0 - 0.2 10e9/L Final    Abs Immature Granulocytes 0.0 0 - 0.4 10e9/L Final    Absolute Nucleated RBC 0.0  Final         5/1/2017 12:29 AM      Component Results     Component Value Ref Range & Units Status    Sodium 141 133 - 144 mmol/L Final    Potassium 3.6 3.4 - 5.3 mmol/L Final    Chloride 111 (H) 94 - 109 mmol/L Final    Carbon Dioxide 24 20 - 32 mmol/L Final    Anion Gap 6 3 - 14 mmol/L Final    Glucose 104 (H) 70 - 99 mg/dL Final    Urea Nitrogen 13 7 - 30 mg/dL Final    Creatinine 0.55 0.52 - 1.04 mg/dL Final    GFR Estimate >90  Non  GFR Calc   >60 mL/min/1.7m2 Final    GFR Estimate If Black >90   GFR Calc   >60 mL/min/1.7m2 Final    Calcium 8.6 8.5 - 10.1 mg/dL Final         5/1/2017 12:22 AM         5/1/2017  2:11 AM      Result not yet available     Exam Ended         5/1/2017  2:11 AM      Result not yet available     Exam Ended         5/1/2017  2:11 AM      Result not yet available     Exam Ended         5/1/2017 12:45 AM      Component Results     Component Value Ref Range & Units Status    HCG Qualitative Serum Negative NEG Final         5/1/2017  1:50 AM      Narrative      XR CHEST 1 VIEW   5/1/2017 12:59 AM     INDICATION: ? foreign body.    COMPARISON: 4/1/2017.        Impression     IMPRESSION: No convincing infiltrates. Hazy opacity projecting over  the lower lungs likely relate to overlying soft tissues. Scoliosis  convex to the left in the lower thoracic spine. Normal heart size. No  radiopaque foreign body.    TEGAN PANDEY MD         5/1/2017  1:50 AM      Narrative     XR ABDOMEN 1 VIEW   5/1/2017 12:59 AM     INDICATION: ? foreign body.    COMPARISON: 4/9/2017.        Impression     IMPRESSION: Again seen is an IUD in place. Otherwise, no radiopaque  foreign bodies. Normal bowel gas pattern. Curve convex to the right in  the lumbar spine.    TEGAN PANDEY MD                Clinical Quality Measure: Blood Pressure Screening     Your blood pressure was checked while you were in the emergency department today. The last reading we obtained was  BP: 133/90 . Please read the guidelines below about what these numbers mean and what you should do about them.  If your systolic blood pressure (the top number) is less than 120 and your diastolic blood pressure (the bottom number) is less than 80, then your blood pressure is normal. There is nothing more that you need to do about it.  If your systolic blood pressure (the top number) is 120-139 or your diastolic blood pressure (the bottom number) is 80-89, your blood pressure may be higher than it should be. You should have your blood pressure rechecked within a year by a primary care provider.  If your systolic blood pressure (the top number) is 140 or greater or your diastolic blood pressure (the bottom number) is 90 or greater, you may have high blood pressure. High blood pressure is treatable, but if left untreated over time it can put you at risk for heart attack, stroke, or kidney failure. You should have your blood pressure rechecked by a primary care provider within the next 4 weeks.  If your provider in the emergency  department today gave you specific instructions to follow-up with your doctor or provider even sooner than that, you should follow that instruction and not wait for up to 4 weeks for your follow-up visit.        Thank you for choosing [facility]       Thank you for choosing [facility] for your care. Our goal is always to provide you with excellent care. Hearing back from our patients is one way we can continue to improve our services. Please take a few minutes to complete the written survey that you may receive in the mail after you visit with us. Thank you!        [facility] Information     [employer] gives you secure access to your electronic health record. If you see a primary care provider, you can also send messages to your care team and make appointments. If you have questions, please call your primary care clinic.  If you do not have a primary care provider, please call [phone] and they will assist you.        Care EveryWhere ID     This is your Care EveryWhere ID. This could be used by other organizations to access your [facility] medical records  [id number]        After Visit Summary       This is your record. Keep this with you and show to your community pharmacist(s) and doctor(s) at your next visit.

## 2017-04-30 NOTE — TELEPHONE ENCOUNTER
Call Type: Triage Call    Presenting Problem: started a week ago with ear pain, no worse  throbbing pain, headache constant, left side of her head above ear  and sensitive to touch. Constant throbbing pain in the ear, constant  dull headache, able to touch chin to chest, will go to non [place]  urgent care after work today.  Triage Note:  Guideline Title: Ear: Symptoms  Recommended Disposition: See Provider within 8 Hours  Original Inclination:  Override Disposition:  Intended Action:  Physician Contacted: No  Severe pain (sharp, stabbing, throbbing or excruciating aching) unresponsive to 24  hours of home care ?  YES  Laceration/abrasion of ear ? NO  Bloody ear drainage ? NO  Any ear drainage in immunocompromised person ? NO  Blunt ear trauma resulting in a small mass/knot (hematoma) of the ear lobe ? NO  Recent trauma AND blood or clear fluid draining from ear(s) OR new deafness or  marked decrease in hearing ? NO  Sudden complete or partial hearing loss (three days or less) not associated with  any other signs or symptoms ? NO  Other head injury is of most concern ? NO  Any temperature elevation in an immunocompromised individual OR frail elderly with  signs of dehydration ? NO  Physician Instructions:  Care Advice:

## 2017-05-01 ENCOUNTER — APPOINTMENT (OUTPATIENT)
Dept: MRI IMAGING | Facility: CLINIC | Age: 27
End: 2017-05-01
Attending: EMERGENCY MEDICINE
Payer: MEDICARE

## 2017-05-01 ENCOUNTER — APPOINTMENT (OUTPATIENT)
Dept: GENERAL RADIOLOGY | Facility: CLINIC | Age: 27
End: 2017-05-01
Attending: EMERGENCY MEDICINE
Payer: MEDICARE

## 2017-05-01 VITALS
HEART RATE: 78 BPM | RESPIRATION RATE: 16 BRPM | DIASTOLIC BLOOD PRESSURE: 90 MMHG | TEMPERATURE: 98 F | SYSTOLIC BLOOD PRESSURE: 133 MMHG | OXYGEN SATURATION: 95 %

## 2017-05-01 LAB
ANION GAP SERPL CALCULATED.3IONS-SCNC: 6 MMOL/L (ref 3–14)
B BURGDOR IGG+IGM SER QL: 0.05 (ref 0–0.89)
BASOPHILS # BLD AUTO: 0 10E9/L (ref 0–0.2)
BASOPHILS NFR BLD AUTO: 0.4 %
BUN SERPL-MCNC: 13 MG/DL (ref 7–30)
CALCIUM SERPL-MCNC: 8.6 MG/DL (ref 8.5–10.1)
CHLORIDE SERPL-SCNC: 111 MMOL/L (ref 94–109)
CO2 SERPL-SCNC: 24 MMOL/L (ref 20–32)
CREAT SERPL-MCNC: 0.55 MG/DL (ref 0.52–1.04)
DIFFERENTIAL METHOD BLD: ABNORMAL
EOSINOPHIL # BLD AUTO: 0.3 10E9/L (ref 0–0.7)
EOSINOPHIL NFR BLD AUTO: 2.8 %
ERYTHROCYTE [DISTWIDTH] IN BLOOD BY AUTOMATED COUNT: 13.5 % (ref 10–15)
GFR SERPL CREATININE-BSD FRML MDRD: ABNORMAL ML/MIN/1.7M2
GLUCOSE SERPL-MCNC: 104 MG/DL (ref 70–99)
HCG SERPL QL: NEGATIVE
HCT VFR BLD AUTO: 35.3 % (ref 35–47)
HGB BLD-MCNC: 11.6 G/DL (ref 11.7–15.7)
IMM GRANULOCYTES # BLD: 0 10E9/L (ref 0–0.4)
IMM GRANULOCYTES NFR BLD: 0.3 %
LYMPHOCYTES # BLD AUTO: 2.4 10E9/L (ref 0.8–5.3)
LYMPHOCYTES NFR BLD AUTO: 24.1 %
MCH RBC QN AUTO: 29.6 PG (ref 26.5–33)
MCHC RBC AUTO-ENTMCNC: 32.9 G/DL (ref 31.5–36.5)
MCV RBC AUTO: 90 FL (ref 78–100)
MONOCYTES # BLD AUTO: 0.7 10E9/L (ref 0–1.3)
MONOCYTES NFR BLD AUTO: 6.9 %
NEUTROPHILS # BLD AUTO: 6.4 10E9/L (ref 1.6–8.3)
NEUTROPHILS NFR BLD AUTO: 65.5 %
NRBC # BLD AUTO: 0 10*3/UL
NRBC BLD AUTO-RTO: 0 /100
PLATELET # BLD AUTO: 270 10E9/L (ref 150–450)
POTASSIUM SERPL-SCNC: 3.6 MMOL/L (ref 3.4–5.3)
RBC # BLD AUTO: 3.92 10E12/L (ref 3.8–5.2)
SODIUM SERPL-SCNC: 141 MMOL/L (ref 133–144)
WBC # BLD AUTO: 9.8 10E9/L (ref 4–11)

## 2017-05-01 PROCEDURE — 25500064 ZZH RX 255 OP 636: Performed by: RADIOLOGY

## 2017-05-01 PROCEDURE — 96375 TX/PRO/DX INJ NEW DRUG ADDON: CPT | Mod: 59

## 2017-05-01 PROCEDURE — 70553 MRI BRAIN STEM W/O & W/DYE: CPT

## 2017-05-01 PROCEDURE — A9585 GADOBUTROL INJECTION: HCPCS | Performed by: RADIOLOGY

## 2017-05-01 PROCEDURE — 96376 TX/PRO/DX INJ SAME DRUG ADON: CPT | Mod: 59

## 2017-05-01 PROCEDURE — 70544 MR ANGIOGRAPHY HEAD W/O DYE: CPT | Mod: XS

## 2017-05-01 PROCEDURE — 70549 MR ANGIOGRAPH NECK W/O&W/DYE: CPT

## 2017-05-01 PROCEDURE — 74000 XR ABDOMEN 1 VW: CPT

## 2017-05-01 PROCEDURE — 25000128 H RX IP 250 OP 636: Performed by: EMERGENCY MEDICINE

## 2017-05-01 PROCEDURE — 85025 COMPLETE CBC W/AUTO DIFF WBC: CPT | Performed by: EMERGENCY MEDICINE

## 2017-05-01 PROCEDURE — 71010 XR CHEST 1 VW: CPT

## 2017-05-01 PROCEDURE — 96374 THER/PROPH/DIAG INJ IV PUSH: CPT | Mod: 59

## 2017-05-01 PROCEDURE — 80048 BASIC METABOLIC PNL TOTAL CA: CPT | Performed by: EMERGENCY MEDICINE

## 2017-05-01 RX ORDER — MORPHINE SULFATE 4 MG/ML
4 INJECTION, SOLUTION INTRAMUSCULAR; INTRAVENOUS ONCE
Status: COMPLETED | OUTPATIENT
Start: 2017-05-01 | End: 2017-05-01

## 2017-05-01 RX ORDER — ONDANSETRON 2 MG/ML
4 INJECTION INTRAMUSCULAR; INTRAVENOUS ONCE
Status: COMPLETED | OUTPATIENT
Start: 2017-05-01 | End: 2017-05-01

## 2017-05-01 RX ORDER — IBUPROFEN 600 MG/1
600 TABLET, FILM COATED ORAL EVERY 6 HOURS PRN
Qty: 30 TABLET | Refills: 0 | Status: SHIPPED | OUTPATIENT
Start: 2017-05-01 | End: 2017-07-20

## 2017-05-01 RX ORDER — MORPHINE SULFATE 4 MG/ML
INJECTION, SOLUTION INTRAMUSCULAR; INTRAVENOUS
Status: DISCONTINUED
Start: 2017-05-01 | End: 2017-05-01 | Stop reason: HOSPADM

## 2017-05-01 RX ORDER — GADOBUTROL 604.72 MG/ML
10 INJECTION INTRAVENOUS ONCE
Status: COMPLETED | OUTPATIENT
Start: 2017-05-01 | End: 2017-05-01

## 2017-05-01 RX ORDER — TRAMADOL HYDROCHLORIDE 50 MG/1
50-100 TABLET ORAL EVERY 6 HOURS PRN
Qty: 15 TABLET | Refills: 0 | Status: SHIPPED | OUTPATIENT
Start: 2017-05-01 | End: 2017-05-11

## 2017-05-01 RX ORDER — LORAZEPAM 2 MG/ML
0.5 INJECTION INTRAMUSCULAR ONCE
Status: COMPLETED | OUTPATIENT
Start: 2017-05-01 | End: 2017-05-01

## 2017-05-01 RX ORDER — ONDANSETRON 2 MG/ML
INJECTION INTRAMUSCULAR; INTRAVENOUS
Status: DISCONTINUED
Start: 2017-05-01 | End: 2017-05-01 | Stop reason: HOSPADM

## 2017-05-01 RX ORDER — KETOROLAC TROMETHAMINE 30 MG/ML
30 INJECTION, SOLUTION INTRAMUSCULAR; INTRAVENOUS ONCE
Status: COMPLETED | OUTPATIENT
Start: 2017-05-01 | End: 2017-05-01

## 2017-05-01 RX ADMIN — MORPHINE SULFATE 4 MG: 4 INJECTION, SOLUTION INTRAMUSCULAR; INTRAVENOUS at 01:40

## 2017-05-01 RX ADMIN — GADOBUTROL 10 ML: 604.72 INJECTION INTRAVENOUS at 03:00

## 2017-05-01 RX ADMIN — LORAZEPAM 0.5 MG: 2 INJECTION INTRAMUSCULAR; INTRAVENOUS at 04:30

## 2017-05-01 RX ADMIN — ONDANSETRON 4 MG: 2 INJECTION INTRAMUSCULAR; INTRAVENOUS at 04:38

## 2017-05-01 RX ADMIN — MORPHINE SULFATE 4 MG: 4 INJECTION, SOLUTION INTRAMUSCULAR; INTRAVENOUS at 00:11

## 2017-05-01 RX ADMIN — KETOROLAC TROMETHAMINE 30 MG: 30 INJECTION, SOLUTION INTRAMUSCULAR at 04:30

## 2017-05-01 RX ADMIN — ONDANSETRON 4 MG: 2 INJECTION INTRAMUSCULAR; INTRAVENOUS at 00:11

## 2017-05-01 NOTE — ED NOTES
Pt complaint of lt sided headache/facial pain/ear pain for the last week.  Tylenol and ibu with no relief, last ibuprofen at 7pm this evening.  Associated nausea.  Pt does have migraine meds at home but did not take because pain does not feel like typical migraine.

## 2017-05-01 NOTE — ED PROVIDER NOTES
"  History     Chief Complaint:  Headache, Ear Pain      HPI   Nevin Alvarado is a 26 year old female with history of depression, anxiety, and migraines who presents via EMS for evaluation of left ear pain and left sided headache.  The patient reports about one week ago she had onset of left sided ear pain along with left sided headache, tingling, and hypersensitivity to the left side of her face.  She states she has been taking Ibuprofen and Tylenol without improvement of her pain.  The patient states tonight her pain significantly worsened and she became nauseated and lightheaded and therefore called EMS.  She reports her last dose was 600 mg Ibuprofen at 1900.    The describes her ear pain as a sharp throbbing pain that feels, \"deep\" inside the ear.  She notes it is worse with lying down.  She denies hearing loss, ringing in the ears, change in hearing, or discharge from the ear.   She notes that  two weeks ago she woke up one night and noticed her left eyelid seemed asymmetrical when she looked in the mirror but reports this resolved shortly afterwards.  She reports no one sided numbness or weakness.  She denies photophobia, double vision or loss of vision.  The patient states she has a history of migraines but this does not feel similar.  She reports no fever, chills, neck pain or stiffness, chest pain, shortness of breath, abdominal pain.  She reports no current vaginal bleeding and normal menstrual cycles.  She denies chance of pregnancy due to IUD and sexual inactivity.  She denies rash, history of Lyme's disease or possible tick exposure.      Allergies:  Augmentin - swelling   Blood-Group Specific Substance   Hydrocodone - nausea and vomiting   Influenza Vaccines  Oseltamivir - hives  Vicodin [Hydrocodone-Acetaminophen] - nausea and vomiting   Cephalosporins - rash      Medications:    Albuterol  Senokot  Sumatriptan  Topiramate  Pristiq     Past Medical History:    ADD  Anorexia nervosa with " bulimia  Anxiety  Borderline personality  Self harm  Migraine with aura  Obesity  PTSD     Past Surgical History:    EGD  Rectal surgery  Knee surgery  Laparoscopic ablation endometriosis  Exploratory laparotomy  Flexible sigmoidoscopy  Release carpal tunnel   Mammoplasty reduction  Lymph node removal, neck    Family History:  Grandmother: Type II Diabetes    Social History:  Relationship status: Single  The patient denies smoking.   The patient denies alcohol use.   The patient presents alone via EMS.     Review of Systems   Constitutional: Negative for chills and fever.   HENT: Positive for ear pain. Negative for ear discharge and tinnitus.    Respiratory: Negative for shortness of breath.    Cardiovascular: Negative for chest pain.   Gastrointestinal: Positive for nausea. Negative for abdominal pain and vomiting.   Genitourinary: Negative for vaginal bleeding.   Musculoskeletal: Negative for neck pain and neck stiffness.   Skin: Negative for rash.   Neurological: Positive for facial asymmetry, light-headedness and headaches. Negative for weakness and numbness.   All other systems reviewed and are negative.      Physical Exam     Patient Vitals for the past 24 hrs:   BP Temp Temp src Pulse Resp SpO2   05/01/17 0500 - - - - - 95 %   05/01/17 0445 - - - - - 96 %   04/30/17 2239 133/90 98  F (36.7  C) Oral 78 16 97 %       Physical Exam  General: Uncomfortable appearing adult female sitting upright  Eyes: PERRL, Conjunctive within normal limits. EOMI. Slight ptosis of the left eyelid.  Able to fully close the eyelid.   HENT: Moist mucous membranes, oropharynx clear. Some facial asymmetry with smiling but unable to detect droop.   TMs bilaterally clear and normal in appearance, as are the external auditory canals.  Neck:  No rigidity, normal spontaneous range of motion.   CV: Normal S1S2, no murmur, rub or gallop. Regular rate and rhythm  Resp: Clear to auscultation bilaterally, no wheezes, rales or rhonchi. Normal  respiratory effort.  GI: Abdomen is soft, nontender and nondistended. No palpable masses. No rebound or guarding.  MSK: No edema. Nontender. Normal active range of motion.  Skin: Warm and dry. No rashes or lesions or ecchymoses on visible skin.  Neuro: Alert and oriented. Responds appropriately to all questions and commands. Normal muscle tone.  Speech is normal.  Sensation to light touch equal aside from hyperesthesia on the left. Slight asymmetry of smile.  Psych: Anxious appearing.    Emergency Department Course     Imaging:  Radiographic findings were communicated with the patient who voiced understanding of the findings.    Chest XR per radiology:   IMPRESSION: No convincing infiltrates. Hazy opacity projecting over  the lower lungs likely relate to overlying soft tissues. Scoliosis  convex to the left in the lower thoracic spine. Normal heart size. No  radiopaque foreign body.    Abdomen XR per radiology:   IMPRESSION: Again seen is an IUD in place. Otherwise, no radiopaque  foreign bodies. Normal bowel gas pattern. Curve convex to the right in  the lumbar spine.    MR brain, with and without contrast, per radiology:   Conclusion:  1. No recent infarct, intracranial mass, abnormal enhancement or evidence of intracranial hemorrhage.   2. Mild to moderate T2 signal changes in the supratentorial white matter in a subcortical and periventricular distribution.  The appearance is nonspecific with considerations including areas of chronic gliosis or demyelination.  The appearance and distribution is not classic for a primary demyelinating process such as multiple sclerosis but this would be included in the differential diagnosis.  3. Intracranial contents are otherwise unremarkable.     MRA Head, without contrast, as per radiology:   1. Unremarkable head MRA    MRA Neck, with and without contrast, per radiology:   1. Unremarkable neck MRA      Laboratory:  CBC: WBC 9.8 (WNL) HGB 11.6 (L)  (WNL)   BMP: Cr 0.55  (WNL) Glucose 104 (H) Chloride 111 (H) Rest WNL  Lyme Disease benedicto with reflex to WB serum: Pending      HCG Qualitative Blood: Negative    Interventions:  0011 Zofran, 4 mg, IV injection  0011 Morphine, 4 mg, IV injection  0140 Morphine, 4 mg, IV injection  0430 Ativan, 0.5 mg, IV injection  0430 Toradol, 30 mg, IV injection  0438 Zofran, 4 mg, IV injection    ED Course:  The patient arrived by ambulance. She was escorted to the ED where her vitals were measured and recorded.     Nursing notes and past medical history reviewed.     I performed a physical examination of the patient as documented above.    I explained the plan with the patient who consents to this.      0423 Recheck and update. Patient still in pain, but improved.    0439 Discussed the patient with Dr. Brice from Neurology.    I personally reviewed the laboratory and imaging results with the Patient and answered all related questions prior to discharge.     Findings and plan explained to the Patient. Patient discharged home with instructions regarding supportive care, medications, and reasons to return. The importance of close follow-up was reviewed.     Impression & Plan      Medical Decision Making:  Nevin Alvarado is a 26 year old female with history of migraine headaches who presents to the emergency department today with concerns of 4-5 days of progressively worsening ear pain with no hearing change though progression to left sided headache as well as facial tingling.  Multiple etiologies were considered including primary ear etiology such as otitis media.  I also considered acoustic neuroma, mass, or central process.  She had noted ptosis here and some asymmetry of her face but she was not classic facial nerve palsy, so Bell's palsy seemed less likely.  She had no miosis and Ronny's Syndrome seems unlikely.  The ptosis seemed to improve over her stay here.  Her symptoms do not seem consistent with myasthenia.  She had no diplopia.  She  had no fever and infectious pathology seems unlikely.  With normal MRI/MRA of the head, neck and brain, CNS pathology or other life threatening pathologies seem unlikely.  I cannot exclude primary ear issue and therefore she is recommended follow up with ENT.  I spoke with Dr. Brice of Neurology and he does not believe there is any other obvious neurologic cause.  She is to control her symptoms with Ibuprofen.  Tramadol for pain not controlled by ibuprofen.  Return immediately to the ED should symptoms worsen.  PCP follow up within 3 days and ENT follow up within 2-3 days.  All questions were answered prior to discharge.     Diagnosis:    ICD-10-CM   1. Acute nonintractable headache, unspecified headache type R51   2. Otalgia, left ear H92.02   3. Ptosis, left H02.402   4. Facial paresthesia R20.9         Disposition:   Discharge to home with primary care follow up.     Discharge Medications:    IBUPROFEN (ADVIL/MOTRIN) 600 MG TABLET    Take 1 tablet (600 mg) by mouth every 6 hours as needed for moderate pain    TRAMADOL (ULTRAM) 50 MG TABLET    Take 1-2 tablets ( mg) by mouth every 6 hours as needed for pain         Willy MURRAY am serving as a scribe on 4/30/2017 at 11:01 PM to personally document services performed by Kathy Barrett MD, based on my observations and the provider's statements to me.         Kathy Barrett MD  05/01/17 0719

## 2017-05-01 NOTE — DISCHARGE INSTRUCTIONS
Discharge Instructions  Headache    You were seen today for a headache. Headaches may be caused by many different things such as muscle tension, sinus inflammation, anxiety and stress, having too little sleep, too much alcohol, some medical conditions or injury. You may have a migraine, which is caused by changes in the blood vessels in your head.  At this time your doctor does not find that your headache is a sign of anything dangerous or life-threatening.  However, sometimes the signs of serious illness do not show up right away.  If you have new or worse symptoms, you may need to be seen again in the emergency department or by your primary doctor.      Return to the Emergency Department if:    You get a fever of 101 F or higher.    Your headache gets much worse.    You get a stiff neck with your headache.    You get a new headache that is different or worse than headaches you have had before.    You are vomiting and can t keep food or water down.    You have blurry or double vision or other problems with your eyes.    You have a new weakness on one side of your body.    You have difficulty with balance which is new.    You or your family thinks you are confused.    You have a seizure or convulsion.    What can I do to help myself?    Pain medications - You may take a pain medication such as Tylenol  (acetaminophen), Advil , Nuprin  (ibuprofen) or Aleve  (naproxen).  If you have been given a narcotic such as Vicodin  (hydrocodone with acetaminophen), Percocet  (oxycodone with acetaminophen), codeine, or a muscle relaxant such as Flexeril  (cyclobenzaprine) or Soma  (carisoprodol), do not drive for four hours after you have taken it. If the narcotic contains Tylenol  (acetaminophen), do not take Tylenol  with it. All narcotics will cause constipation, so eat a high fiber diet.        Take a pain reliever as soon as you notice symptoms.  Starting medications as soon as you start to have symptoms may lessen the  amount of pain you have.    Relaxing in a quiet, dark room may help.    Get enough sleep and eat meals regularly.    Schedule an appointment with your primary physician as instructed, or at least within 1 week.    You may need to watch for certain foods or other things which may trigger your headaches.  Keeping a journal of your headaches and possible triggers may help you and your primary doctor to identify things which you should avoid which may be causing your headaches.  If you were given a prescription for medicine here today, be sure to read all of the information (including the package insert) that comes with your prescription.  This will include important information about the medicine, its side effects, and any warnings that you need to know about.  The pharmacist who fills the prescription can provide more information and answer questions you may have about the medicine.  If you have questions or concerns that the pharmacist cannot address, please call or return to the Emergency Department.     Remember that you can always come back to the Emergency Department if you are not able to see your regular doctor in the amount of time listed above, if you get any new symptoms, or if there is anything that worries you.

## 2017-05-01 NOTE — TELEPHONE ENCOUNTER
"Call Type: Triage Call    Presenting Problem: \"Headache and sharp left ear pain last week, now  headache is worse on both sides. Numb and gerald on one side of  face.\"  Triage Note:  Guideline Title: Headache  Recommended Disposition: Activate   Original Inclination: Wanted to speak with a nurse  Override Disposition:  Intended Action: Follow advice given  Physician Contacted: No  New numbness, weakness or paralysis involving face, arm or leg, especially on same  side of body, loss of balance or coordination, confusion or trouble speaking  occurring now or within last 8 hours ?  YES  Unconscious now ? NO  New seizure now or within last 6 hours ? NO  Sudden, severe disabling head pain OR caller spontaneously verbalizes \"worst  headache of my life\" ? NO  Sudden change in mental status or new change in speech ? NO  Physician Instructions:  Care Advice: Do not give the patient anything to eat or drink.  Write down provider's name. List or place the following in a bag for  transport with the patient: current prescription and/or nonprescription  medications  alternative treatments, therapies and medications  and street drugs.  Spinal Immobilization:    - If head, neck, or spinal injury or disease is  known, or suspected, tell the patient not to move.   - Do not move the  person unless there is an immediate threat to their life.  - If moving  becomes absolutely necessary, immobilize head, neck and spine. - Then move  the patient's head, neck and body as a single unit to prevent or not worsen  spinal cord injury.  - If vomiting, immobilize, then roll patient to side  with head, neck and body as a single unit. Do not turn or move head or  neck.  An adult should stay with the patient, preferably one trained in CPR. If  the person is not trained in CPR, then he or she should provide hands-only  (compression-only) CPR as recommended by the [employer].  "

## 2017-05-02 ENCOUNTER — TELEPHONE (OUTPATIENT)
Dept: NURSING | Facility: CLINIC | Age: 27
End: 2017-05-02

## 2017-05-02 NOTE — TELEPHONE ENCOUNTER
"Call Type: Triage Call    Presenting Problem: Nevin states she was seen in the ER last night  for ear pain and headache. She was prescribed Tramadol. She now c/o  vomiting X1 about 30 minutes ago. She says that she feels  \"lightheaded, like her head is floating on a cloud\". She is asking  if she should return to the ER. Advised to have a little food in her  stomach when taking medication, and follow d/c instruction from ER  last night.  Triage Note:  Guideline Title: Nausea or Vomiting  Recommended Disposition: Provide Home/Self Care  Original Inclination: Wanted to speak with a nurse  Override Disposition:  Intended Action: Follow Selfcare / Homecare  Physician Contacted: No  All other situations ?  YES  Abdominal pain is more bothersome than vomiting ? NO  Possible or known pregnancy ? NO  Recent onset of increased urination, thirst, hunger with weight loss, fatigue ? NO  Signs of dehydration ? NO  Known diabetic ? NO  New or worsening signs and symptoms that may indicate shock ? NO  Foreign body in mouth, throat, or esophagus ? NO  Smoke/carbon monoxide exposure ? NO  Any change in vision OR eye pain ? NO  Other vomiting of blood (red, black, coffee ground, scant, or large amount) ? NO  Following ingestion of toxic or caustic substance ? NO  Diagnosed with Meniere's disease or labyrinthitis AND not responding to previous  treatment recommended by provider ? NO  Excessive alcohol use for this individual in last few hours AND has vomited a few  times ? NO  Unbearable abdominal/pelvic pain ? NO  Abruptly stopped or decreased dose of corticosteroids ? NO  Vomiting red, bloody or coffee-ground material, more than streaks of blood or  scant amount (not following nosebleed within past day) ? NO  Continuous or excessive alcohol intake during a few hours AND not oriented to  person, time or place ? NO  Continuous or repeated vomiting for more than 8 hours AND unable to keep any  fluids down ? NO  Diarrhea (without blood " in stool) following crampy abdominal pain AND repeated  vomiting that has not improved with 3 days of home care ? NO  Loss of appetite for 3 or more days OR nausea for 7 or more days ? NO  Symptoms start suddenly with motion, especially travel (ship, plane, train, car) ?  NO  Any vomiting associated with a head injury, now or within previous 48 hours ? NO  New onset of 3-4 episodes vomiting or diarrhea following mild abdominal cramping ?  NO  Sudden onset of diarrhea, usually with abdominal pain, nausea and sometimes  vomiting, occurring within 36 hours after eating unpasteurized, raw or  undercooked foods OR drinking unpurified or nonchlorinated water ? NO  Nausea/vomiting associated with sudden onset of moderate to severe pain in  genitals, groin or lower abdomen. ? NO  New onset nausea/vomiting associated with stiff neck causing pain with forward  head movement, severe generalized headache, fever, or altered mental status ? NO  Sudden onset vomiting following ingestion or inhalation overdose (accidental or  intentional) of illegal, prescription, nonprescription or alternative drugs ? NO  Unable to take or keep down prescription medication (heart, respiratory, diabetes,  thyroid, antibiotics, birth control pills, corticosteroids) because of  nausea/vomiting ? NO  Nausea/vomiting AND fever 101.5 F (38.6 C) or higher AND urinary tract symptoms. ?  NO  Jaundice (yellowish tint to skin or whites of eyes) that is worsening or not  previously evaluated. ? NO  Any other cardiac signs/symptoms for more than 5 minutes, now or within last hour.  Pain is NOT associated with taking a deep breath or a productive cough, movement,  or touch to a localized area on the chest or upper body. ? NO  Three or more episodes of heartburn per week for two weeks or more AND no previous  evaluation by provider ? NO  Three or more episodes of indigestion/reflux per week for two weeks or more AND  symptoms not relieved with home care ?  NO  Vomiting associated with sudden, severe disabling head pain. ? NO  Bloody diarrhea AND has not been evaluated ? NO  Vomiting associated with sudden onset of focal neurological changes (difficulty  speaking, numbness, weakness, paralysis, loss of coordination, change in vision  such as double vision or loss of visual field) ? NO  Symptoms started after recent gastrointestinal (GI)/genitourinary () surgery or  procedure and have lasted longer than defined in provider specified discharge  information ? NO  Symptoms began after starting or changing dose of prescription, nonprescription,  alternative medication, or illicit drug within last 7 days ? NO  High to low (but not zero) risk of exposure to Ebola within the past 21 days ? NO  Traveled out of country in past 2 months and new onset of unexplained symptom(s) ?  NO  Abdominal pain, which may be colicky, followed by episodes of vomiting occurring  for more than 8 hours; abdominal distention may or may not occur ? NO  New onset of vomiting following recent radiation or chemotherapy AND symptoms are  not improving with 12 hours of home care ? NO  Currently being treated for gastrointestinal disease (pancreatitis, gallstones,  ulcers) AND symptoms not improving or are worsening using recommended treatment  for 24 hours or more ? NO  Onset of vomiting associated with severe, worsening headache, especially if  different from previous headaches                       See Provider within 4  hours ? NO  Physician Instructions:  Care Advice: Vomiting Care Advice:   - Do not eat solid foods until vomiting  subsides.   - Begin taking fluids by sucking on ice chips or popsicles or  taking sips of cool clear, nonprescription oral rehydration solution).    -  Gradually drink larger amounts of these fluids so that you are drinking six  to eight 8 oz. (.2 liter) of fluids a day.   - Keep activity to a minimum.  - After vomiting subsides, eat smaller, more frequent meals of  easily  digested foods such as crackers, toast, bananas, rice, cooked cereal,  applesauce, broth, baked or mashed potatoes, chicken or turkey without  skin.  Eat slowly. - Take fluids 30 minutes before or 60 minutes after  meals.   - Avoid high fat, highly seasoned, high fiber or high sugar  content foods.  - Avoid extremely hot or cold foods.    - Do not take pain  medication (such as aspirin, NSAIDs) while nauseated or vomiting.   -  Consult your provider for advice regarding continuing prescription  medication.  - Rest as much as possible in a sitting or in a propped lying  position.  Do not lie flat for at least 2 hours after eating.

## 2017-05-03 ENCOUNTER — TELEPHONE (OUTPATIENT)
Dept: NURSING | Facility: CLINIC | Age: 27
End: 2017-05-03

## 2017-05-03 NOTE — TELEPHONE ENCOUNTER
"Call Type: Triage Call    Presenting Problem: Nevin states she has taken 100mg of Tramadol  about 5 hours ago. She says she feels lightheaded, dizzy, and  nauseated, and says \"like I'm floating on a cloud\". She called last  night with the exact same issue. She did not speak to  her PCP  anytime today during clinic hours, and then still took the Tramadol  again tonight, even though it made her feel this way last night. She  asks again tonight \"do you think I should go to the ER?\". Advised to  let the medication wear off, not to take anymore, and follow advice  given last night.  Triage Note:  Guideline Title: Medication Questions - Adult  Recommended Disposition: Provide Health Information  Original Inclination: Wanted to speak with a nurse  Override Disposition:  Intended Action: Follow advice given  Physician Contacted: No  Caller has medication question(s) that was answered with available resources ?  YES  Pregnant and has medication questions regarding prescribed and/or nonprescribed  medication(s) not covered by available resources ? NO  Breastfeeding and has medication questions regarding prescribed and/or  nonprescribed medication(s) not covered by available resources ? NO  Requested information on how to safely dispose of  or unused medications ?  NO  Sign(s) or symptom(s) associated with a diagnosed condition or with a new illness  ? NO  Prescription ordered today and not available at pharmacy putting patient at  clinical risk ? NO  Recurrence of a symptom(s) or illness post prescribed medication treatment AND  provider instructed patient to call if symptom(s) returned. ? NO  Unable to obtain prescribed medication related to available resources AND  situation poses immediate clinical risk ? NO  Pharmacy calling to clarify prescription order. ? NO  Requests refill of prescribed medication that does NOT have a valid refill; lack  of medication may cause clinical risk to patient if not available. ? " NO  Has questions about prescribed and/or nonprescribed medications not covered by  available resources ? NO  Pharmacy calling with prescription question; answered per department policy. ? NO  Requests refill of prescribed medication without valid refills OR requests refill  of prescribed medication with valid refills but does not have prescription number  (no RX container); lack of medication does not put patient at clinical risk ? NO  Physician Instructions:  Care Advice:

## 2017-05-04 ENCOUNTER — HOSPITAL ENCOUNTER (EMERGENCY)
Facility: CLINIC | Age: 27
Discharge: HOME | End: 2017-05-04
Attending: EMERGENCY MEDICINE | Admitting: EMERGENCY MEDICINE
Payer: MEDICARE

## 2017-05-04 VITALS
HEART RATE: 81 BPM | HEIGHT: 62 IN | TEMPERATURE: 98.1 F | WEIGHT: 222.22 LBS | OXYGEN SATURATION: 98 % | DIASTOLIC BLOOD PRESSURE: 97 MMHG | RESPIRATION RATE: 18 BRPM | BODY MASS INDEX: 40.89 KG/M2 | SYSTOLIC BLOOD PRESSURE: 149 MMHG

## 2017-05-04 DIAGNOSIS — H02.402 PTOSIS, LEFT: ICD-10-CM

## 2017-05-04 DIAGNOSIS — R51.9 CHRONIC NONINTRACTABLE HEADACHE, UNSPECIFIED HEADACHE TYPE: ICD-10-CM

## 2017-05-04 DIAGNOSIS — H57.12 LEFT EYE PAIN: ICD-10-CM

## 2017-05-04 DIAGNOSIS — G89.29 CHRONIC NONINTRACTABLE HEADACHE, UNSPECIFIED HEADACHE TYPE: ICD-10-CM

## 2017-05-04 PROCEDURE — 99283 EMERGENCY DEPT VISIT LOW MDM: CPT

## 2017-05-04 RX ORDER — PROPARACAINE HYDROCHLORIDE 5 MG/ML
SOLUTION/ DROPS OPHTHALMIC
Status: DISCONTINUED
Start: 2017-05-04 | End: 2017-05-04 | Stop reason: HOSPADM

## 2017-05-04 ASSESSMENT — ENCOUNTER SYMPTOMS
HEADACHES: 1
EYE DISCHARGE: 0
EYE PAIN: 1

## 2017-05-04 NOTE — ED AVS SNAPSHOT
[facility] Emergency Department    [address]    [facility] [address]    Phone:  [phone]    Fax:  [phone]                                       Nevin Alvarado   MRN: [id number]    Department:  [facility] Emergency Department   Date of Visit:  5/4/2017           After Visit Summary Signature Page     I have received my discharge instructions, and my questions have been answered. I have discussed any challenges I see with this plan with the nurse or doctor.    ..........................................................................................................................................  Patient/Patient Representative Signature      ..........................................................................................................................................  Patient Representative Print Name and Relationship to Patient    ..................................................               ................................................  Date                                            Time    ..........................................................................................................................................  Reviewed by Signature/Title    ...................................................              ..............................................  Date                                                            Time

## 2017-05-04 NOTE — LETTER
[facility] EMERGENCY DEPARTMENT  [address]  [facility] [address]  Phone: [phone]  Fax: [phone]    May 4, 2017        Nevin Alvarado  [address]   [facility] [address]          To whom it may concern:    RE: Nevin Alvarado    Was seen in emergency dept   May return to work  May 5 2017    Please contact me for questions or concerns.      Sincerely,    Michaelle Cali RN

## 2017-05-04 NOTE — ED AVS SNAPSHOT
[facility] Emergency Department    [address]    [facility] [address]    Phone:  [phone]    Fax:  [phone]                                       Nevin Alvarado   MRN: [id number]    Department:  [facility] Emergency Department   Date of Visit:  5/4/2017           Patient Information     Date Of Birth          1990        Your diagnoses for this visit were:     Left eye pain     Chronic nonintractable headache, unspecified headache type     Ptosis, left        You were seen by Siddharth Soler MD.      Follow-up Information     Follow up with Neurology and Opthalomology as discussed.      Discharge References/Attachments     PAIN, ACUTE, UNCERTAIN CAUSE (ENGLISH)      Future Appointments        Provider Department Dept Phone Center    5/19/2017 10:15 AM Carli Chiang PA-C [facility] [phone]       24 Hour Appointment Hotline       To make an appointment at any [facility], call [phone] ([phone]). If you don't have a family doctor or clinic, we will help you find one. [facility] are conveniently located to serve the needs of you and your family.             Review of your medicines      Our records show that you are taking the medicines listed below. If these are incorrect, please call your family doctor or clinic.        Dose / Directions Last dose taken    albuterol 108 (90 BASE) MCG/ACT Inhaler   Commonly known as:  albuterol   Dose:  2 puff   Quantity:  1 Inhaler        Inhale 2 puffs into the lungs every 4 hours as needed for shortness of breath / dyspnea   Refills:  0        ibuprofen 600 MG tablet   Commonly known as:  ADVIL/MOTRIN   Dose:  600 mg   Quantity:  30 tablet        Take 1 tablet (600 mg) by mouth every 6 hours as needed for moderate pain   Refills:  0        polyethylene glycol powder   Commonly known as:  MIRALAX/GLYCOLAX   Dose:  17 g   Quantity:  510 g        Take 17 g by mouth  daily as needed for constipation   Refills:  3        PRISTIQ PO   Dose:  100 mg        Take 100 mg by mouth daily   Refills:  0        senna 8.6 MG tablet   Commonly known as:  SENOKOT   Dose:  1 tablet   Quantity:  60 tablet        Take 1 tablet by mouth 2 times daily as needed for constipation   Refills:  0        SUMATRIPTAN SUCCINATE PO   Dose:  50 mg        Take 50 mg by mouth once as needed for migraine   Refills:  0        TOPIRAMATE PO        Take 50 mg by mouth in the morning and 100 mg by mouth in the evening   Refills:  0        traMADol 50 MG tablet   Commonly known as:  ULTRAM   Dose:   mg   Quantity:  15 tablet        Take 1-2 tablets ( mg) by mouth every 6 hours as needed for pain   Refills:  0        VITAMIN D3 PO   Dose:  5000 Units        Take 5,000 Units by mouth daily   Refills:  0                Orders Needing Specimen Collection     None      Pending Results     No orders found from 5/2/2017 to 5/5/2017.            Pending Culture Results     No orders found from 5/2/2017 to 5/5/2017.            Pending Results Instructions     If you had any lab results that were not finalized at the time of your Discharge, you can call the ED Lab Result RN at [phone]. You will be contacted by this team for any positive Lab results or changes in treatment. The nurses are available 7 days a week from 10A to 6:30P.  You can leave a message 24 hours per day and they will return your call.        Test Results From Your Hospital Stay               Clinical Quality Measure: Blood Pressure Screening     Your blood pressure was checked while you were in the emergency department today. The last reading we obtained was  BP: (!) 149/97 . Please read the guidelines below about what these numbers mean and what you should do about them.  If your systolic blood pressure (the top number) is less than 120 and your diastolic blood pressure (the bottom number) is less than 80, then your blood pressure is  normal. There is nothing more that you need to do about it.  If your systolic blood pressure (the top number) is 120-139 or your diastolic blood pressure (the bottom number) is 80-89, your blood pressure may be higher than it should be. You should have your blood pressure rechecked within a year by a primary care provider.  If your systolic blood pressure (the top number) is 140 or greater or your diastolic blood pressure (the bottom number) is 90 or greater, you may have high blood pressure. High blood pressure is treatable, but if left untreated over time it can put you at risk for heart attack, stroke, or kidney failure. You should have your blood pressure rechecked by a primary care provider within the next 4 weeks.  If your provider in the emergency department today gave you specific instructions to follow-up with your doctor or provider even sooner than that, you should follow that instruction and not wait for up to 4 weeks for your follow-up visit.        Thank you for choosing [facility]       Thank you for choosing [facility] for your care. Our goal is always to provide you with excellent care. Hearing back from our patients is one way we can continue to improve our services. Please take a few minutes to complete the written survey that you may receive in the mail after you visit with us. Thank you!        [facility] Information     [facility] gives you secure access to your electronic health record. If you see a primary care provider, you can also send messages to your care team and make appointments. If you have questions, please call your primary care clinic.  If you do not have a primary care provider, please call [phone] and they will assist you.        Care EveryWhere ID     This is your Care EveryWhere ID. This could be used by other organizations to access your [facility] medical records  [id number]        After Visit Summary       This is your record. Keep this with you and show to your community  pharmacist(s) and doctor(s) at your next visit.

## 2017-05-04 NOTE — ED PROVIDER NOTES
"  History     Chief Complaint:  Eye Pain and Headache      HPI   Nevin Alvarado is a 26 year old female with a history of depression, anxiety, migraine headaches, and borderline personality disorder who presents for evaluation of eye pain and a headache. Approximately a week ago, the patient started to develop pain in her left eye, blurred vision and \"double vision,\" difficulty fully opening her left eye, and a left-sided headache. On 4/30/2017, the patient was evaluated in the ED by Dr. Barrett regarding these symptoms.  At that time, she has a brain MR that showed mild to moderate T2 signal changes in the supratentorial white matter in a subcortical and periventricular distribution. Otherwise, her brain MR and head and neck MRA were unremarkable. She also had a negative workup including chest X-ray, abdominal X-ray, CBC, BMP, HCG, and lyme disease testing that would return negative. She was prescribed Tramadol and Ibuprofen and discharged to home. Since then, the patient has had persistent symptoms, prompting her to seek reevaluation in the ED. Currently in the ED, the patient rates her pain at a severity of 8/10. She has not had any discharge from the eye and has not had any recent trauma or injury to the eye.     MR Imaging 4/30/2017:  MR brain, with and without contrast, per radiology:   Conclusion:  1. No recent infarct, intracranial mass, abnormal enhancement or evidence of intracranial hemorrhage.   2. Mild to moderate T2 signal changes in the supratentorial white matter in a subcortical and periventricular distribution. The appearance is nonspecific with considerations including areas of chronic gliosis or demyelination. The appearance and distribution is not classic for a primary demyelinating process such as multiple sclerosis but this would be included in the differential diagnosis.  3. Intracranial contents are otherwise unremarkable.      MRA Head, without contrast, as per radiology:   1. " "Unremarkable head MRA     MRA Neck, with and without contrast, per radiology:   1. Unremarkable neck MRA    Allergies:  Augmentin  Hydrocodone  Influenza vaccine   Oseltamivir   Vicodin   Cephalosporins      Medications:    traMADol (ULTRAM) 50 MG tablet  TOPIRAMATE PO  Desvenlafaxine Succinate (PRISTIQ PO)  Cholecalciferol (VITAMIN D3 PO)  ibuprofen (ADVIL/MOTRIN) 600 MG tablet  albuterol (ALBUTEROL) 108 (90 BASE) MCG/ACT Inhaler  senna (SENOKOT) 8.6 MG tablet  polyethylene glycol (MIRALAX/GLYCOLAX) powder  SUMATRIPTAN SUCCINATE PO    Past Medical History:    ADD   Anorexia nervosa with bulimia  Anxiety  Borderline personality disorder  Depression  History of self-harm  History of swallowed foreign body - recurrent issue  Migraine headaches   Morbid obesity  PTSD  Rectal foreign body - recurrent issue.     Past Surgical History:    EGD, combined  Exam under anesthesia, anus   Remove fecal impaction or FB with anesthesia   Right knee surgery  Laparoscopic ablation endometriosis   Laparoscopy exploratory   Mammoplasty reduction  Lymph nodes removes from neck, benign  Sigmoidoscopy flexible   Release carpal tunnel     Family History:    Cerebrovascular disease - Father     Social History:  Tobacco use:    Never smoker  Alcohol use:    Negative  Marital status:    Single   Accompanied to ED by:  Alone      Review of Systems   Eyes: Positive for pain (left) and visual disturbance (left). Negative for discharge.   Neurological: Positive for headaches.   All other systems reviewed and are negative.    Physical Exam   First Vitals:  BP: 149/97  Heart Rate: 81  Temp: 98.1  F (36.7  C)  Resp: 18  Height: 157.5 cm (5' 2\")  Weight: 100.8 kg (222 lb 3.6 oz)  SpO2: 98 %      Physical Exam  Nursing note and vitals reviewed.  Constitutional: Cooperative.   HENT:   Mouth/Throat: Mucous membranes are normal.   Eyes: Pupils are equal, round, and reactive to light. EOMI.  Left eye: Fluorescein stain shows no ulceration or abrasion.  " No foreign body and no discharge. Intraocular pressure 15. No hyphema.  Cardiovascular: Normal rate, regular rhythm and normal heart sounds.  No murmur.  Pulmonary/Chest: Effort normal and breath sounds normal. No respiratory distress. No wheezes. No rales.   Neurological: Alert. Oriented x4  Skin: Skin is warm and dry.   Psychiatric: Normal mood and affect.     Emergency Department Course     Emergency Department Course:  Nursing notes and vitals reviewed.  0210: I performed an exam of the patient as documented above.     Findings and plan explained to the Patient. Patient discharged home with instructions regarding supportive care, medications, and reasons to return. The importance of close follow-up was reviewed.     Impression & Plan      Medical Decision Making:  Nevin Alvarado is a 26 year old female with the above history who presents with a week of eye pain and headache. She states that she cannot open her left eyelid. She appears to be attempting to hold the eyelid closed on exam and when reading her discharge paperwork, the nurse notes that she easily opens her eyes without difficulty. Extraocular movements are intact. Pupils are round and reactive to light. There are no foreign bodies. No evidence of corneal abrasion, ulceration, or dendritic lesions. Her pressure is normal. She had an MRI that showed some non-specific white matter changes several days ago but no other acute findings. I believe her to be feigning her symptoms at this time. No indication for further workup. At discharge, she is asking for a work note for tomorrow. She will be discharged home with instructions to follow up with ophthalmology if her symptoms persist.     Diagnosis:    ICD-10-CM   1. Left eye pain H57.12   2. Chronic nonintractable headache, unspecified headache type R51   3. Ptosis, left - likely intentional H02.402       Disposition:  Discharged to home.       IFabricio, am serving as a scribe at 2:10 AM on  5/4/2017 to document services personally performed by Dr. Soler, based on my observations and the provider's statements to me.    [facility] EMERGENCY DEPARTMENT       Siddharth Soler MD  05/04/17 0428

## 2017-05-04 NOTE — ED NOTES
"Pt arrives with left eye pain that started about a week ago. Pt states she was seen recently here 3 or 4 days ago for ear pain and the eye pain was present at that time. Pt states she sees double in the left eye. Pt states \"it feels like a lazy eye\". Pt also states feeling dizzy, lightheaded and has had a headache for a week since the eye pain started. Pt took tramadol this evening at 2000 without relief. H/o of migraines. ABC's intact.   "

## 2017-05-08 ENCOUNTER — TELEPHONE (OUTPATIENT)
Dept: INTERNAL MEDICINE | Facility: CLINIC | Age: 27
End: 2017-05-08

## 2017-05-08 LAB — PHQ9 SCORE: 18

## 2017-05-08 NOTE — TELEPHONE ENCOUNTER
Would you like to call Yajaira back and speak with her or should I route to care coordinator?  Please advise.  Thank you.

## 2017-05-08 NOTE — TELEPHONE ENCOUNTER
Yajaira Albarran from [facility]. Called   Would like a call back from Dr. Ramos or the nurse to discuss pt possibly being back on the [place] [employer] [facility]  Please call Yajaira at [phone] ok to leave a detailed message

## 2017-05-10 LAB — PHQ9 SCORE: 18

## 2017-05-11 ENCOUNTER — HOSPITAL ENCOUNTER (EMERGENCY)
Facility: CLINIC | Age: 27
Discharge: HOME | End: 2017-05-12
Attending: EMERGENCY MEDICINE | Admitting: EMERGENCY MEDICINE
Payer: MEDICARE

## 2017-05-11 DIAGNOSIS — R10.84 ABDOMINAL PAIN, GENERALIZED: ICD-10-CM

## 2017-05-11 PROCEDURE — 99283 EMERGENCY DEPT VISIT LOW MDM: CPT

## 2017-05-11 PROCEDURE — 81001 URINALYSIS AUTO W/SCOPE: CPT | Performed by: EMERGENCY MEDICINE

## 2017-05-11 PROCEDURE — 81025 URINE PREGNANCY TEST: CPT | Performed by: EMERGENCY MEDICINE

## 2017-05-11 NOTE — ED AVS SNAPSHOT
[facility] Emergency Department    [address]    [facility] [address]    Phone:  [phone]    Fax:  [phone]                                       Nevin Alvarado   MRN: [id number]    Department:  [facility] Emergency Department   Date of Visit:  5/11/2017           Patient Information     Date Of Birth          1990        Your diagnoses for this visit were:     Abdominal pain, generalized        You were seen by Siddharth Soler MD.      Follow-up Information     Follow up with Sandra Ramos MD.    Specialty:  Internal Medicine    Why:  monday    Contact information:    [facility]  [address] [facility] [address]  [phone]          Discharge Instructions       Discharge Instructions  Abdominal Pain    Abdominal pain can be caused by many things. Your evaluation today does not show the exact cause for your pain. Your doctor today has decided that it is unlikely your pain is due to a life threatening problem, or a problem requiring surgery or hospital admission. Sometimes those problems cannot be found right away, so it is very important that you follow up as directed.  Sometimes only the changes which occur over time allow the cause of your pain to be found.    Return to the Emergency Department for a recheck in 8-12 hours if your pain continues.  If your pain gets worse, changes in location, or feels different, return to the Emergency Department right away.    ADULTS:  Return to the Emergency Department right away if:      You get an oral temperature above 102oF or as directed by your doctor.    You have blood in your stools (bright red or black, tarry stools).    You keep throwing up or can t drink liquids.    You see blood when you throw up.    You can t have a bowel movement or you can t pass gas.    Your stomach gets bloated or bigger.    Your skin or the whites of your eyes look yellow.    You faint.    You have bloody,  "frequent or painful urination.    You have new symptoms or anything that worries you.    MORE INFORMATION:    Appendicitis:  A possible cause of abdominal pain in any person who still has their appendix is acute appendicitis. Appendicitis is often hard to diagnose.  Testing does not always rule out early appendicitis or other causes of abdominal pain. Close follow-up with your doctor and re-evaluations may be needed to figure out the reason for your abdominal pain.    Follow-up:  It is very important that you make an appointment with your clinic and go to the appointment.  If you do not follow-up with your primary doctor, it may result in missing an important development which could result in permanent injury or disability and/or lasting pain.  If there is any problem keeping your appointment, call your doctor or return to the Emergency Department.    Medications:  Take your medications as directed by your doctor today.  Before using over-the-counter medications, ask your doctor and make sure to take the medications as directed.  If you have any questions about medications, ask your doctor.    Diet:  Resume your normal diet as much as possible, but do not eat fried, fatty or spicy foods while you have pain.  Do not drink alcohol or have caffeine.  Do not smoke tobacco.    Probiotics: If you have been given an antibiotic, you may want to also take a probiotic pill or eat yogurt with live cultures. Probiotics have \"good bacteria\" to help your intestines stay healthy. Studies have shown that probiotics help prevent diarrhea and other intestine problems (including C. diff infection) when you take antibiotics. You can buy these without a prescription in the pharmacy section of the store.     If you were given a prescription for medicine here today, be sure to read all of the information (including the package insert) that comes with your prescription.  This will include important information about the medicine, its side " effects, and any warnings that you need to know about.  The pharmacist who fills the prescription can provide more information and answer questions you may have about the medicine.  If you have questions or concerns that the pharmacist cannot address, please call or return to the Emergency Department.     Remember that you can always come back to the Emergency Department if you are not able to see your regular doctor in the amount of time listed above, if you get any new symptoms, or if there is anything that worries you.          Future Appointments        Provider Department Dept Phone Center    5/19/2017 10:15 AM Carli Chiang PA-C [facility] [phone]       24 Hour Appointment Hotline       To make an appointment at any [facility], call [phone] ([phone]). If you don't have a family doctor or clinic, we will help you find one. [facility] are conveniently located to serve the needs of you and your family.             Review of your medicines      Our records show that you are taking the medicines listed below. If these are incorrect, please call your family doctor or clinic.        Dose / Directions Last dose taken    albuterol 108 (90 BASE) MCG/ACT Inhaler   Commonly known as:  albuterol   Dose:  2 puff   Quantity:  1 Inhaler        Inhale 2 puffs into the lungs every 4 hours as needed for shortness of breath / dyspnea   Refills:  0        ibuprofen 600 MG tablet   Commonly known as:  ADVIL/MOTRIN   Dose:  600 mg   Quantity:  30 tablet        Take 1 tablet (600 mg) by mouth every 6 hours as needed for moderate pain   Refills:  0        polyethylene glycol powder   Commonly known as:  MIRALAX/GLYCOLAX   Dose:  17 g   Quantity:  510 g        Take 17 g by mouth daily as needed for constipation   Refills:  3        PRISTIQ PO   Dose:  100 mg        Take 100 mg by mouth daily   Refills:  0        senna 8.6 MG tablet   Commonly known as:  SENOKOT   Dose:  1  tablet   Quantity:  60 tablet        Take 1 tablet by mouth 2 times daily as needed for constipation   Refills:  0        SUMATRIPTAN SUCCINATE PO   Dose:  50 mg        Take 50 mg by mouth once as needed for migraine   Refills:  0        TOPIRAMATE PO        Take 50 mg by mouth in the morning and 100 mg by mouth in the evening   Refills:  0        VITAMIN D3 PO   Dose:  5000 Units        Take 5,000 Units by mouth daily   Refills:  0                Procedures and tests performed during your visit     Basic metabolic panel    CBC + differential    HCG qualitative urine    UA with Microscopic      Orders Needing Specimen Collection     None      Pending Results     No orders found for last 3 day(s).            Pending Culture Results     No orders found for last 3 day(s).            Pending Results Instructions     If you had any lab results that were not finalized at the time of your Discharge, you can call the ED Lab Result RN at [phone]. You will be contacted by this team for any positive Lab results or changes in treatment. The nurses are available 7 days a week from 10A to 6:30P.  You can leave a message 24 hours per day and they will return your call.        Test Results From Your Hospital Stay        5/12/2017 12:56 AM      Component Results     Component Value Ref Range & Units Status    HCG Qual Urine Negative NEG Final         5/12/2017 12:56 AM      Component Results     Component Value Ref Range & Units Status    Color Urine Yellow  Final    Appearance Urine Clear  Final    Glucose Urine Negative NEG mg/dL Final    Bilirubin Urine Negative NEG Final    Ketones Urine Negative NEG mg/dL Final    Specific Gravity Urine 1.016 1.003 - 1.035 Final    Blood Urine Negative NEG Final    pH Urine 6.0 5.0 - 7.0 pH Final    Protein Albumin Urine Negative NEG mg/dL Final    Urobilinogen mg/dL 0.0 0.0 - 2.0 mg/dL Final    Nitrite Urine Negative NEG Final    Leukocyte Esterase Urine Negative NEG Final    Source  Unspecified Urine  Final    WBC Urine 1 0 - 2 /HPF Final    RBC Urine 1 0 - 2 /HPF Final    Bacteria Urine Few (A) NEG /HPF Final    Squamous Epithelial /HPF Urine 2 (H) 0 - 1 /HPF Final         5/12/2017  1:30 AM      Component Results     Component Value Ref Range & Units Status    Sodium 141 133 - 144 mmol/L Final    Potassium 3.8 3.4 - 5.3 mmol/L Final    Chloride 111 (H) 94 - 109 mmol/L Final    Carbon Dioxide 23 20 - 32 mmol/L Final    Anion Gap 7 3 - 14 mmol/L Final    Glucose 107 (H) 70 - 99 mg/dL Final    Urea Nitrogen 14 7 - 30 mg/dL Final    Creatinine 0.59 0.52 - 1.04 mg/dL Final    GFR Estimate >90  Non  GFR Calc   >60 mL/min/1.7m2 Final    GFR Estimate If Black >90   GFR Calc   >60 mL/min/1.7m2 Final    Calcium 9.0 8.5 - 10.1 mg/dL Final         5/12/2017  1:45 AM      Component Results     Component Value Ref Range & Units Status    WBC 9.0 4.0 - 11.0 10e9/L Final    RBC Count 4.02 3.8 - 5.2 10e12/L Final    Hemoglobin 12.0 11.7 - 15.7 g/dL Final    Hematocrit 36.3 35.0 - 47.0 % Final    MCV 90 78 - 100 fl Final    MCH 29.9 26.5 - 33.0 pg Final    MCHC 33.1 31.5 - 36.5 g/dL Final    RDW 13.5 10.0 - 15.0 % Final    Platelet Count 242 150 - 450 10e9/L Final    Diff Method Automated Method  Final    % Neutrophils 64.6 % Final    % Lymphocytes 24.7 % Final    % Monocytes 8.4 % Final    % Eosinophils 1.8 % Final    % Basophils 0.3 % Final    % Immature Granulocytes 0.2 % Final    Nucleated RBCs 0 0 /100 Final    Absolute Neutrophil 5.8 1.6 - 8.3 10e9/L Final    Absolute Lymphocytes 2.2 0.8 - 5.3 10e9/L Final    Absolute Monocytes 0.8 0.0 - 1.3 10e9/L Final    Absolute Eosinophils 0.2 0.0 - 0.7 10e9/L Final    Absolute Basophils 0.0 0.0 - 0.2 10e9/L Final    Abs Immature Granulocytes 0.0 0 - 0.4 10e9/L Final    Absolute Nucleated RBC 0.0  Final                Clinical Quality Measure: Blood Pressure Screening     Your blood pressure was checked while you were in the emergency  department today. The last reading we obtained was  BP: 132/83 . Please read the guidelines below about what these numbers mean and what you should do about them.  If your systolic blood pressure (the top number) is less than 120 and your diastolic blood pressure (the bottom number) is less than 80, then your blood pressure is normal. There is nothing more that you need to do about it.  If your systolic blood pressure (the top number) is 120-139 or your diastolic blood pressure (the bottom number) is 80-89, your blood pressure may be higher than it should be. You should have your blood pressure rechecked within a year by a primary care provider.  If your systolic blood pressure (the top number) is 140 or greater or your diastolic blood pressure (the bottom number) is 90 or greater, you may have high blood pressure. High blood pressure is treatable, but if left untreated over time it can put you at risk for heart attack, stroke, or kidney failure. You should have your blood pressure rechecked by a primary care provider within the next 4 weeks.  If your provider in the emergency department today gave you specific instructions to follow-up with your doctor or provider even sooner than that, you should follow that instruction and not wait for up to 4 weeks for your follow-up visit.        Thank you for choosing [facility]       Thank you for choosing [facility] for your care. Our goal is always to provide you with excellent care. Hearing back from our patients is one way we can continue to improve our services. Please take a few minutes to complete the written survey that you may receive in the mail after you visit with us. Thank you!        [email or web address] Information     [facility] [employer] gives you secure access to your electronic health record. If you see a primary care provider, you can also send messages to your care team and make appointments. If you have questions, please call your primary care clinic.  If you do not have a primary  care provider, please call [phone] and they will assist you.        Care EveryWhere ID     This is your Care EveryWhere ID. This could be used by other organizations to access your [facility] medical records  [id number]        After Visit Summary       This is your record. Keep this with you and show to your community pharmacist(s) and doctor(s) at your next visit.

## 2017-05-11 NOTE — ED AVS SNAPSHOT
[facility] Emergency Department    [address]    [facility] [address]    Phone:  [phone]    Fax:  [phone]                                       Nevin Alvarado   MRN: [id number]    Department:  [facility] Emergency Department   Date of Visit:  5/11/2017           After Visit Summary Signature Page     I have received my discharge instructions, and my questions have been answered. I have discussed any challenges I see with this plan with the nurse or doctor.    ..........................................................................................................................................  Patient/Patient Representative Signature      ..........................................................................................................................................  Patient Representative Print Name and Relationship to Patient    ..................................................               ................................................  Date                                            Time    ..........................................................................................................................................  Reviewed by Signature/Title    ...................................................              ..............................................  Date                                                            Time

## 2017-05-12 VITALS
WEIGHT: 223.99 LBS | DIASTOLIC BLOOD PRESSURE: 71 MMHG | OXYGEN SATURATION: 99 % | RESPIRATION RATE: 16 BRPM | TEMPERATURE: 98.1 F | SYSTOLIC BLOOD PRESSURE: 121 MMHG | BODY MASS INDEX: 40.97 KG/M2

## 2017-05-12 LAB
ALBUMIN UR-MCNC: NEGATIVE MG/DL
ANION GAP SERPL CALCULATED.3IONS-SCNC: 7 MMOL/L (ref 3–14)
APPEARANCE UR: CLEAR
BACTERIA #/AREA URNS HPF: ABNORMAL /HPF
BASOPHILS # BLD AUTO: 0 10E9/L (ref 0–0.2)
BASOPHILS NFR BLD AUTO: 0.3 %
BILIRUB UR QL STRIP: NEGATIVE
BUN SERPL-MCNC: 14 MG/DL (ref 7–30)
CALCIUM SERPL-MCNC: 9 MG/DL (ref 8.5–10.1)
CHLORIDE SERPL-SCNC: 111 MMOL/L (ref 94–109)
CO2 SERPL-SCNC: 23 MMOL/L (ref 20–32)
COLOR UR AUTO: YELLOW
CREAT SERPL-MCNC: 0.59 MG/DL (ref 0.52–1.04)
DIFFERENTIAL METHOD BLD: NORMAL
EOSINOPHIL # BLD AUTO: 0.2 10E9/L (ref 0–0.7)
EOSINOPHIL NFR BLD AUTO: 1.8 %
ERYTHROCYTE [DISTWIDTH] IN BLOOD BY AUTOMATED COUNT: 13.5 % (ref 10–15)
GFR SERPL CREATININE-BSD FRML MDRD: ABNORMAL ML/MIN/1.7M2
GLUCOSE SERPL-MCNC: 107 MG/DL (ref 70–99)
GLUCOSE UR STRIP-MCNC: NEGATIVE MG/DL
HCG UR QL: NEGATIVE
HCT VFR BLD AUTO: 36.3 % (ref 35–47)
HGB BLD-MCNC: 12 G/DL (ref 11.7–15.7)
HGB UR QL STRIP: NEGATIVE
IMM GRANULOCYTES # BLD: 0 10E9/L (ref 0–0.4)
IMM GRANULOCYTES NFR BLD: 0.2 %
KETONES UR STRIP-MCNC: NEGATIVE MG/DL
LEUKOCYTE ESTERASE UR QL STRIP: NEGATIVE
LYMPHOCYTES # BLD AUTO: 2.2 10E9/L (ref 0.8–5.3)
LYMPHOCYTES NFR BLD AUTO: 24.7 %
MCH RBC QN AUTO: 29.9 PG (ref 26.5–33)
MCHC RBC AUTO-ENTMCNC: 33.1 G/DL (ref 31.5–36.5)
MCV RBC AUTO: 90 FL (ref 78–100)
MONOCYTES # BLD AUTO: 0.8 10E9/L (ref 0–1.3)
MONOCYTES NFR BLD AUTO: 8.4 %
NEUTROPHILS # BLD AUTO: 5.8 10E9/L (ref 1.6–8.3)
NEUTROPHILS NFR BLD AUTO: 64.6 %
NITRATE UR QL: NEGATIVE
NRBC # BLD AUTO: 0 10*3/UL
NRBC BLD AUTO-RTO: 0 /100
PH UR STRIP: 6 PH (ref 5–7)
PLATELET # BLD AUTO: 242 10E9/L (ref 150–450)
POTASSIUM SERPL-SCNC: 3.8 MMOL/L (ref 3.4–5.3)
RBC # BLD AUTO: 4.02 10E12/L (ref 3.8–5.2)
RBC #/AREA URNS AUTO: 1 /HPF (ref 0–2)
SODIUM SERPL-SCNC: 141 MMOL/L (ref 133–144)
SP GR UR STRIP: 1.02 (ref 1–1.03)
SQUAMOUS #/AREA URNS AUTO: 2 /HPF (ref 0–1)
URN SPEC COLLECT METH UR: ABNORMAL
UROBILINOGEN UR STRIP-MCNC: 0 MG/DL (ref 0–2)
WBC # BLD AUTO: 9 10E9/L (ref 4–11)
WBC #/AREA URNS AUTO: 1 /HPF (ref 0–2)

## 2017-05-12 PROCEDURE — 85025 COMPLETE CBC W/AUTO DIFF WBC: CPT | Performed by: EMERGENCY MEDICINE

## 2017-05-12 PROCEDURE — 25000132 ZZH RX MED GY IP 250 OP 250 PS 637: Mod: GY | Performed by: EMERGENCY MEDICINE

## 2017-05-12 PROCEDURE — A9270 NON-COVERED ITEM OR SERVICE: HCPCS | Mod: GY | Performed by: EMERGENCY MEDICINE

## 2017-05-12 PROCEDURE — 36415 COLL VENOUS BLD VENIPUNCTURE: CPT | Performed by: EMERGENCY MEDICINE

## 2017-05-12 PROCEDURE — 80048 BASIC METABOLIC PNL TOTAL CA: CPT | Performed by: EMERGENCY MEDICINE

## 2017-05-12 RX ORDER — IBUPROFEN 800 MG/1
800 TABLET, FILM COATED ORAL ONCE
Status: COMPLETED | OUTPATIENT
Start: 2017-05-12 | End: 2017-05-12

## 2017-05-12 RX ADMIN — IBUPROFEN 800 MG: 800 TABLET, FILM COATED ORAL at 01:07

## 2017-05-12 ASSESSMENT — ENCOUNTER SYMPTOMS
BACK PAIN: 1
FEVER: 0
ABDOMINAL PAIN: 1
RECTAL PAIN: 0
NAUSEA: 1
VOMITING: 0

## 2017-05-12 NOTE — ED NOTES
IN TRIAGE airway,breathing and circulation intact, without need for intervention . Alert and interacting appropriately for age and situation.2 weeks trouble urinating. Tonight came by ambulance. Pain when urinating and in back

## 2017-05-12 NOTE — ED PROVIDER NOTES
History     Chief Complaint:  Abdominal Pain    HPI   Nevin Alvarado is a 26 year old female who presents to the emergency department today via EMS for evaluation of abdominal pain. The patient reports two weeks ago she developed lower abdominal pain with associated nausea and dysuria. Earlier this week, she woke up with a reported fever and vomiting although that has since subsided. She attempted to see her PCP, although unable to be seen therefore called EMS and presents to the ED for further evalution. Upon presentation, she states her pain radiates to her proximal right thigh and right lower back. She denies concern for retained rectal foreign body as she has a history of this.     Allergies:  Augmentin  Blood-Group Specific Substance  Hydrocodone  Influenza Vaccines  Oseltamivir  Vicodin [Hydrocodone-Acetaminophen]  Cephalosporins     Medications:    Topirmate  Desvenlafaxine  Albuterol  Senokot  Miralax/Glycolax  Sumatriptan succinate    Past Medical History:    ADD  Anorexia nervosa with bulimia  Anxiety  Borderline personality disorder  Depression  Self harm  Migraine without aura  Morbid obesity  PTSD  Rectal foreign body    Past Surgical History:    EGD, combined  Exam under anesthesia, anus   Remove fecal impaction or FB with anesthesia   Right knee surgery  Laparoscopic ablation endometriosis   Laparoscopy exploratory   Mammoplasty reduction  Lymph nodes removes from neck, benign  Sigmoidoscopy flexible   Release carpal tunnel     Family History:    Cerebrovascular disease    Social History:  The patient was accompanied to the ED by herself.  Smoking Status: Never  Smokeless Tobacco: Never  Alcohol Use: No  Marital Status:  Single      Review of Systems   Constitutional: Negative for fever (resolved).   Gastrointestinal: Positive for abdominal pain (lower radiate to back and right upper thigh) and nausea. Negative for rectal pain and vomiting (resolved).   Musculoskeletal: Positive for back  pain.   All other systems reviewed and are negative.    Physical Exam   First Vitals:  BP: 132/83  Heart Rate: 84  Temp: 98.1  F (36.7  C)  Resp: 20  Weight: 101.6 kg (223 lb 15.8 oz)  SpO2: 99 %      Physical Exam  Nursing note and vitals reviewed.  Constitutional: Cooperative. Resting comfortably  HENT:   Mouth/Throat: Mucous membranes are normal.   Cardiovascular: Normal rate, regular rhythm and normal heart sounds.  No murmur.  Pulmonary/Chest: Effort normal and breath sounds normal. No respiratory distress. No wheezes. No rales.   Abdominal: Soft. Normal appearance and bowel sounds are normal. No distension. There is no rigidity and no guarding. Mild Suprapubic tenderness.   Neurological: Alert. Oriented x4  Skin: Skin is warm and dry. No rash noted.   Psychiatric: Normal mood and affect.     Emergency Department Course     Laboratory:  Laboratory findings were communicated with the patient who voiced understanding of the findings.  BMP: Chloride 111(H), Glucose 107(H) o/w WNL (Creatinine 0.59)  CBC: WNL. (WBC 9.0, HGB 12.0, )     UA: clear yellow urine, few bacteria, squamous epithelial 2 o/w WNL    HCG Qualitative Urine: Negative    Interventions:  0107 Ibuprofen 800mg PO     Emergency Department Course:  Nursing notes and vitals reviewed.  IV was inserted and blood was drawn for laboratory testing, results above.  The patient provided a urine sample here in the emergency department. This was sent for laboratory testing, findings above.  0008: I performed an exam of the patient as documented above.   0155: Patient rechecked and updated.   I discussed the treatment plan with the patient. They expressed understanding of this plan and consented to discharge. They will be discharged home with instructions for care and follow up. In addition, the patient will return to the emergency department if their symptoms persist, worsen, if new symptoms arise or if there is any concern.  All questions were  answered.  I personally reviewed the laboratory results with the Patient and answered all related questions prior to discharge.    Impression & Plan      Medical Decision Making:  Nevin Alvarado is a 26 year old female who presents with lower abdominal pain. She does have a chart history of recurrent placement of rectal foreign bodies but denies this at this visit. Her main complaints are dysuria and back pain. Vitals signs are reassuring and she is afebrile. Urine analysis shows no evidence of infection. She is not pregnant. Her symptoms do not lateralize to the right or left to be concerned for appendicitis or ovarian pathology. Given recurrent work ups in the past and two weeks of symptoms, I would hold on advanced imaging especially with reassuring labs and normal white blood cell count. Patient discharged to home and follow up with regular physician.     Diagnosis:    ICD-10-CM    1. Abdominal pain, generalized R10.84      Disposition:   Discharged to home     Scribe Disclosure:  Elke MURRAY, am serving as a scribe at 12:00 AM on 5/12/2017 to document services personally performed by Siddharth Soler MD, based on my observations and the provider's statements to me.    5/11/2017   [facility] EMERGENCY DEPARTMENT       Siddharth Soler MD  05/12/17 0220

## 2017-05-12 NOTE — ED NOTES
"Patient presents to ED via EMS due to abd pain. Per EMS report patient c/o abd pain that developed approx 2 weeks ago. Reports \"burning when peeing\" attempted to be seen by PCP but unable to be seen . On arrival patient is ABC intact . A/Ox 4   "

## 2017-05-16 ENCOUNTER — APPOINTMENT (OUTPATIENT)
Dept: GENERAL RADIOLOGY | Facility: CLINIC | Age: 27
End: 2017-05-16
Attending: EMERGENCY MEDICINE
Payer: MEDICARE

## 2017-05-16 ENCOUNTER — APPOINTMENT (OUTPATIENT)
Dept: CT IMAGING | Facility: CLINIC | Age: 27
End: 2017-05-16
Attending: EMERGENCY MEDICINE
Payer: MEDICARE

## 2017-05-16 ENCOUNTER — HOSPITAL ENCOUNTER (EMERGENCY)
Facility: CLINIC | Age: 27
Discharge: HOME | End: 2017-05-16
Attending: EMERGENCY MEDICINE | Admitting: EMERGENCY MEDICINE
Payer: MEDICARE

## 2017-05-16 VITALS
HEART RATE: 114 BPM | BODY MASS INDEX: 41.26 KG/M2 | SYSTOLIC BLOOD PRESSURE: 132 MMHG | WEIGHT: 224.2 LBS | DIASTOLIC BLOOD PRESSURE: 95 MMHG | TEMPERATURE: 98.4 F | RESPIRATION RATE: 16 BRPM | HEIGHT: 62 IN | OXYGEN SATURATION: 99 %

## 2017-05-16 DIAGNOSIS — N83.202 LEFT OVARIAN CYST: ICD-10-CM

## 2017-05-16 DIAGNOSIS — R11.2 NON-INTRACTABLE VOMITING WITH NAUSEA, UNSPECIFIED VOMITING TYPE: ICD-10-CM

## 2017-05-16 DIAGNOSIS — R10.84 ABDOMINAL PAIN, GENERALIZED: ICD-10-CM

## 2017-05-16 LAB
ALBUMIN SERPL-MCNC: 3.6 G/DL (ref 3.4–5)
ALBUMIN UR-MCNC: NEGATIVE MG/DL
ALP SERPL-CCNC: 72 U/L (ref 40–150)
ALT SERPL W P-5'-P-CCNC: 20 U/L (ref 0–50)
ANION GAP SERPL CALCULATED.3IONS-SCNC: 8 MMOL/L (ref 3–14)
APPEARANCE UR: CLEAR
AST SERPL W P-5'-P-CCNC: 6 U/L (ref 0–45)
BACTERIA #/AREA URNS HPF: ABNORMAL /HPF
BASOPHILS # BLD AUTO: 0 10E9/L (ref 0–0.2)
BASOPHILS NFR BLD AUTO: 0.3 %
BILIRUB SERPL-MCNC: 0.3 MG/DL (ref 0.2–1.3)
BILIRUB UR QL STRIP: NEGATIVE
BUN SERPL-MCNC: 11 MG/DL (ref 7–30)
CALCIUM SERPL-MCNC: 8.6 MG/DL (ref 8.5–10.1)
CHLORIDE SERPL-SCNC: 109 MMOL/L (ref 94–109)
CO2 SERPL-SCNC: 24 MMOL/L (ref 20–32)
COLOR UR AUTO: YELLOW
CREAT SERPL-MCNC: 0.61 MG/DL (ref 0.52–1.04)
DIFFERENTIAL METHOD BLD: NORMAL
EOSINOPHIL # BLD AUTO: 0.2 10E9/L (ref 0–0.7)
EOSINOPHIL NFR BLD AUTO: 1.9 %
ERYTHROCYTE [DISTWIDTH] IN BLOOD BY AUTOMATED COUNT: 13.3 % (ref 10–15)
GFR SERPL CREATININE-BSD FRML MDRD: ABNORMAL ML/MIN/1.7M2
GLUCOSE SERPL-MCNC: 103 MG/DL (ref 70–99)
GLUCOSE UR STRIP-MCNC: NEGATIVE MG/DL
HCT VFR BLD AUTO: 35.8 % (ref 35–47)
HGB BLD-MCNC: 12.1 G/DL (ref 11.7–15.7)
HGB UR QL STRIP: ABNORMAL
IMM GRANULOCYTES # BLD: 0 10E9/L (ref 0–0.4)
IMM GRANULOCYTES NFR BLD: 0.2 %
KETONES UR STRIP-MCNC: NEGATIVE MG/DL
LACTATE BLD-SCNC: 1 MMOL/L (ref 0.7–2.1)
LEUKOCYTE ESTERASE UR QL STRIP: NEGATIVE
LYMPHOCYTES # BLD AUTO: 1.7 10E9/L (ref 0.8–5.3)
LYMPHOCYTES NFR BLD AUTO: 18 %
MCH RBC QN AUTO: 30.3 PG (ref 26.5–33)
MCHC RBC AUTO-ENTMCNC: 33.8 G/DL (ref 31.5–36.5)
MCV RBC AUTO: 90 FL (ref 78–100)
MONOCYTES # BLD AUTO: 0.6 10E9/L (ref 0–1.3)
MONOCYTES NFR BLD AUTO: 6.9 %
MUCOUS THREADS #/AREA URNS LPF: PRESENT /LPF
NEUTROPHILS # BLD AUTO: 6.7 10E9/L (ref 1.6–8.3)
NEUTROPHILS NFR BLD AUTO: 72.7 %
NITRATE UR QL: NEGATIVE
NRBC # BLD AUTO: 0 10*3/UL
NRBC BLD AUTO-RTO: 0 /100
PH UR STRIP: 6.5 PH (ref 5–7)
PLATELET # BLD AUTO: 251 10E9/L (ref 150–450)
POTASSIUM SERPL-SCNC: 3.6 MMOL/L (ref 3.4–5.3)
PROT SERPL-MCNC: 7 G/DL (ref 6.8–8.8)
RBC # BLD AUTO: 4 10E12/L (ref 3.8–5.2)
RBC #/AREA URNS AUTO: <1 /HPF (ref 0–2)
SODIUM SERPL-SCNC: 141 MMOL/L (ref 133–144)
SP GR UR STRIP: 1.01 (ref 1–1.03)
SQUAMOUS #/AREA URNS AUTO: 2 /HPF (ref 0–1)
T4 FREE SERPL-MCNC: 0.96 NG/DL (ref 0.76–1.46)
TSH SERPL DL<=0.005 MIU/L-ACNC: 5.06 MU/L (ref 0.4–4)
URN SPEC COLLECT METH UR: ABNORMAL
UROBILINOGEN UR STRIP-MCNC: NORMAL MG/DL (ref 0–2)
WBC # BLD AUTO: 9.2 10E9/L (ref 4–11)
WBC #/AREA URNS AUTO: 2 /HPF (ref 0–2)

## 2017-05-16 PROCEDURE — 99285 EMERGENCY DEPT VISIT HI MDM: CPT | Mod: Z6 | Performed by: EMERGENCY MEDICINE

## 2017-05-16 PROCEDURE — 84439 ASSAY OF FREE THYROXINE: CPT | Performed by: EMERGENCY MEDICINE

## 2017-05-16 PROCEDURE — 25000125 ZZHC RX 250: Performed by: EMERGENCY MEDICINE

## 2017-05-16 PROCEDURE — 74177 CT ABD & PELVIS W/CONTRAST: CPT

## 2017-05-16 PROCEDURE — 99285 EMERGENCY DEPT VISIT HI MDM: CPT | Mod: 25 | Performed by: EMERGENCY MEDICINE

## 2017-05-16 PROCEDURE — 85025 COMPLETE CBC W/AUTO DIFF WBC: CPT | Performed by: EMERGENCY MEDICINE

## 2017-05-16 PROCEDURE — 25000128 H RX IP 250 OP 636: Performed by: EMERGENCY MEDICINE

## 2017-05-16 PROCEDURE — 96361 HYDRATE IV INFUSION ADD-ON: CPT | Performed by: EMERGENCY MEDICINE

## 2017-05-16 PROCEDURE — 84443 ASSAY THYROID STIM HORMONE: CPT | Performed by: EMERGENCY MEDICINE

## 2017-05-16 PROCEDURE — 80053 COMPREHEN METABOLIC PANEL: CPT | Performed by: EMERGENCY MEDICINE

## 2017-05-16 PROCEDURE — 81001 URINALYSIS AUTO W/SCOPE: CPT | Performed by: EMERGENCY MEDICINE

## 2017-05-16 PROCEDURE — 83605 ASSAY OF LACTIC ACID: CPT | Performed by: EMERGENCY MEDICINE

## 2017-05-16 PROCEDURE — 96376 TX/PRO/DX INJ SAME DRUG ADON: CPT | Performed by: EMERGENCY MEDICINE

## 2017-05-16 PROCEDURE — 74010 XR KUB: CPT | Mod: 52

## 2017-05-16 PROCEDURE — 96374 THER/PROPH/DIAG INJ IV PUSH: CPT | Mod: 59 | Performed by: EMERGENCY MEDICINE

## 2017-05-16 PROCEDURE — 96375 TX/PRO/DX INJ NEW DRUG ADDON: CPT | Performed by: EMERGENCY MEDICINE

## 2017-05-16 RX ORDER — SODIUM CHLORIDE 9 MG/ML
1000 INJECTION, SOLUTION INTRAVENOUS CONTINUOUS
Status: DISCONTINUED | OUTPATIENT
Start: 2017-05-16 | End: 2017-05-16 | Stop reason: HOSPADM

## 2017-05-16 RX ORDER — IOPAMIDOL 755 MG/ML
100 INJECTION, SOLUTION INTRAVASCULAR ONCE
Status: COMPLETED | OUTPATIENT
Start: 2017-05-16 | End: 2017-05-16

## 2017-05-16 RX ORDER — PROMETHAZINE HYDROCHLORIDE 25 MG/ML
12.5 INJECTION, SOLUTION INTRAMUSCULAR; INTRAVENOUS ONCE
Status: COMPLETED | OUTPATIENT
Start: 2017-05-16 | End: 2017-05-16

## 2017-05-16 RX ORDER — LIDOCAINE 40 MG/G
CREAM TOPICAL
Status: DISCONTINUED | OUTPATIENT
Start: 2017-05-16 | End: 2017-05-16 | Stop reason: HOSPADM

## 2017-05-16 RX ORDER — HYDROCODONE BITARTRATE AND ACETAMINOPHEN 5; 325 MG/1; MG/1
1-2 TABLET ORAL EVERY 4 HOURS PRN
Qty: 8 TABLET | Refills: 0 | Status: SHIPPED | OUTPATIENT
Start: 2017-05-16 | End: 2017-05-19

## 2017-05-16 RX ORDER — ONDANSETRON 4 MG/1
4 TABLET, ORALLY DISINTEGRATING ORAL EVERY 8 HOURS PRN
Qty: 10 TABLET | Refills: 0 | Status: SHIPPED | OUTPATIENT
Start: 2017-05-16 | End: 2017-05-19

## 2017-05-16 RX ORDER — ONDANSETRON 2 MG/ML
4 INJECTION INTRAMUSCULAR; INTRAVENOUS EVERY 30 MIN PRN
Status: DISCONTINUED | OUTPATIENT
Start: 2017-05-16 | End: 2017-05-16 | Stop reason: HOSPADM

## 2017-05-16 RX ORDER — HYDROMORPHONE HYDROCHLORIDE 1 MG/ML
0.5 INJECTION, SOLUTION INTRAMUSCULAR; INTRAVENOUS; SUBCUTANEOUS
Status: DISCONTINUED | OUTPATIENT
Start: 2017-05-16 | End: 2017-05-16 | Stop reason: HOSPADM

## 2017-05-16 RX ADMIN — SODIUM CHLORIDE 1000 ML: 9 INJECTION, SOLUTION INTRAVENOUS at 03:38

## 2017-05-16 RX ADMIN — HYDROMORPHONE HYDROCHLORIDE 0.5 MG: 1 INJECTION, SOLUTION INTRAMUSCULAR; INTRAVENOUS; SUBCUTANEOUS at 03:40

## 2017-05-16 RX ADMIN — ONDANSETRON 4 MG: 2 INJECTION INTRAMUSCULAR; INTRAVENOUS at 03:40

## 2017-05-16 RX ADMIN — PROMETHAZINE HYDROCHLORIDE 12.5 MG: 25 INJECTION, SOLUTION INTRAMUSCULAR; INTRAVENOUS at 07:03

## 2017-05-16 RX ADMIN — SODIUM CHLORIDE 50 ML: 9 INJECTION, SOLUTION INTRAVENOUS at 06:45

## 2017-05-16 RX ADMIN — HYDROMORPHONE HYDROCHLORIDE 0.5 MG: 1 INJECTION, SOLUTION INTRAMUSCULAR; INTRAVENOUS; SUBCUTANEOUS at 04:41

## 2017-05-16 RX ADMIN — IOPAMIDOL 100 ML: 755 INJECTION, SOLUTION INTRAVENOUS at 06:45

## 2017-05-16 RX ADMIN — SODIUM CHLORIDE 1000 ML: 900 INJECTION, SOLUTION INTRAVENOUS at 05:45

## 2017-05-16 ASSESSMENT — ENCOUNTER SYMPTOMS
FLANK PAIN: 1
HEMATURIA: 0
FREQUENCY: 0
NAUSEA: 1
ABDOMINAL PAIN: 1
VOMITING: 1

## 2017-05-16 NOTE — ED NOTES
Severe nausea for the last couple of hours. Lower left abdominal pain that radiates to back area for the last couple of weeks.

## 2017-05-16 NOTE — DISCHARGE INSTRUCTIONS
-Please make an appointment to follow up with your obstetrician, Your Primary Care Provider and [facility] (phone: [phone]) as soon as possible for further workup.  -Recommend monitoring for any return of or worsening pain, fevers, or nausea/vomiting that are uncontrolled and unable to keep any fluids down. If you are restarting oral intake back to normal diet recommend small amounts of food with increases to normal as tolerated.        *Abdominal Pain, Unknown Cause (Female)    The exact cause of your abdominal (stomach) pain is not certain. This does not mean that this is something to worry about, or the right tests were not done. Everyone likes to know the exact cause of the problem, but sometimes with abdominal pain, there is no clear-cut cause, and this could be a good thing. The good news is that your symptoms can be treated, and you will feel better.   Your condition does not seem serious now; however, sometimes the signs of a serious problem may take more time to appear. For this reason, it is important for you to watch for any new symptoms, problems, or worsening of your condition.  Over the next few days, the abdominal pain may come and go, or be continuous. Other common symptoms can include nausea and vomiting. Sometimes it can be difficult to tell if you feel nauseous, you may just feel bad and not associate that feeling with nausea. Constipation, diarrhea, and a fever may go along with the pain.  The pain may continue even if treated correctly over the following days. Depending on how things go, sometimes the cause can become clear and may require further or different treatment. Additional evaluations, medications, or tests may be needed.  Home care  Your health care provider may prescribe medications for pain, symptoms, or an infection.  Follow the health care provider's instructions for taking these medications.  General care    Rest until your next exam. No strenuous  activities.    Try to find positions that ease discomfort. A small pillow placed on the abdomen may help relieve pain.    Something warm on your abdomen (such as a heating pad) may help, but be careful not to burn yourself.  Diet    Do not force yourself to eat, especially if having cramps, vomiting, or diarrhea.    Water is important so you do not get dehydrated. Soup may also be good. Sports drinks may also help, especially if they are not too acidic. Make sure you don't drink sugary drinks as this can make things worse. Take liquids in small amounts. Do not guzzle them.    Caffeine sometimes makes the pain and cramping worse.    Avoid dairy products if you have vomiting or diarrhea.    Don't eat large amounts at a time. Wait a few minutes between bites.    Eat a diet low in fiber (called a low-residue diet). Foods allowed include refined breads, white rice, fruit and vegetable juices without pulp, tender meats. These foods will pass more easily through the intestine.    Avoid fried or fatty foods, dairy, alcohol and spicy foods until your symptoms go away.  Follow-up care  Follow up with your health care provider as instructed, or if your pain does not begin to improve in the next 24 hours.  When to seek medical care  Seek prompt medical care if any of the following occur:    Pain gets worse or moves to the right lower abdomen    New or worsening vomiting or diarrhea    Swelling of the abdomen    Unable to pass stool for more than three days    New fever over 101  F (38.3 C), or rising fever    Blood in vomit or bowel movements (dark red or black color)    Jaundice (yellow color of eyes and skin)    Weakness, dizziness    Chest, arm, back, neck or jaw pain    Unexpected vaginal bleeding or missed period  Call 911  Call emergency services if any of the following occur:    Trouble breathing    Confusion    Fainting or loss of consciousness    Rapid heart rate    Seizure    [id number] [facility], [address] [place] [address]  [place] [address]. All rights reserved. This information is not intended as a substitute for professional medical care. Always follow your healthcare professional's instructions.
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
## 2017-05-16 NOTE — ED AVS SNAPSHOT
[facility], Emergency Department    [address]    [place] [address]    Phone:  [phone]    Fax:  [phone]                                       Nevin Alvarado   MRN: [id number]    Department:  [facility], Emergency Department   Date of Visit:  5/16/2017           Patient Information     Date Of Birth          1990        Your diagnoses for this visit were:     Abdominal pain, generalized     Non-intractable vomiting with nausea, unspecified vomiting type        You were seen by Campbell Cazares MD.        Discharge Instructions       -Please make an appointment to follow up with your obstetrician, Your Primary Care Provider and [facility] (phone: [phone]) as soon as possible for further workup.  -Recommend monitoring for any return of or worsening pain, fevers, or nausea/vomiting that are uncontrolled and unable to keep any fluids down. If you are restarting oral intake back to normal diet recommend small amounts of food with increases to normal as tolerated.        *Abdominal Pain, Unknown Cause (Female)    The exact cause of your abdominal (stomach) pain is not certain. This does not mean that this is something to worry about, or the right tests were not done. Everyone likes to know the exact cause of the problem, but sometimes with abdominal pain, there is no clear-cut cause, and this could be a good thing. The good news is that your symptoms can be treated, and you will feel better.   Your condition does not seem serious now; however, sometimes the signs of a serious problem may take more time to appear. For this reason, it is important for you to watch for any new symptoms, problems, or worsening of your condition.  Over the next few days, the abdominal pain may come and go, or be continuous. Other common symptoms can include nausea and vomiting. Sometimes it can be difficult to tell if you feel nauseous, you may just feel bad and not  associate that feeling with nausea. Constipation, diarrhea, and a fever may go along with the pain.  The pain may continue even if treated correctly over the following days. Depending on how things go, sometimes the cause can become clear and may require further or different treatment. Additional evaluations, medications, or tests may be needed.  Home care  Your health care provider may prescribe medications for pain, symptoms, or an infection.  Follow the health care provider's instructions for taking these medications.  General care    Rest until your next exam. No strenuous activities.    Try to find positions that ease discomfort. A small pillow placed on the abdomen may help relieve pain.    Something warm on your abdomen (such as a heating pad) may help, but be careful not to burn yourself.  Diet    Do not force yourself to eat, especially if having cramps, vomiting, or diarrhea.    Water is important so you do not get dehydrated. Soup may also be good. Sports drinks may also help, especially if they are not too acidic. Make sure you don't drink sugary drinks as this can make things worse. Take liquids in small amounts. Do not guzzle them.    Caffeine sometimes makes the pain and cramping worse.    Avoid dairy products if you have vomiting or diarrhea.    Don't eat large amounts at a time. Wait a few minutes between bites.    Eat a diet low in fiber (called a low-residue diet). Foods allowed include refined breads, white rice, fruit and vegetable juices without pulp, tender meats. These foods will pass more easily through the intestine.    Avoid fried or fatty foods, dairy, alcohol and spicy foods until your symptoms go away.  Follow-up care  Follow up with your health care provider as instructed, or if your pain does not begin to improve in the next 24 hours.  When to seek medical care  Seek prompt medical care if any of the following occur:    Pain gets worse or moves to the right lower abdomen    New or  worsening vomiting or diarrhea    Swelling of the abdomen    Unable to pass stool for more than three days    New fever over 101  F (38.3 C), or rising fever    Blood in vomit or bowel movements (dark red or black color)    Jaundice (yellow color of eyes and skin)    Weakness, dizziness    Chest, arm, back, neck or jaw pain    Unexpected vaginal bleeding or missed period  Call 911  Call emergency services if any of the following occur:    Trouble breathing    Confusion    Fainting or loss of consciousness    Rapid heart rate    Seizure    [id number] [facility], [address] [place] [address], [place] [address]. All rights reserved. This information is not intended as a substitute for professional medical care. Always follow your healthcare professional's instructions.          Future Appointments        Provider Department Dept Phone Center    5/19/2017 10:15 AM Carli Chiang PA-C [facility] [phone]       24 Hour Appointment Hotline       To make an appointment at any [facility], call [phone]). If you don't have a family doctor or clinic, we will help you find one. [facility] are conveniently located to serve the needs of you and your family.             Review of your medicines      START taking        Dose / Directions Last dose taken    HYDROcodone-acetaminophen 5-325 MG per tablet   Commonly known as:  NORCO   Dose:  1-2 tablet   Quantity:  8 tablet        Take 1-2 tablets by mouth every 4 hours as needed for moderate to severe pain   Refills:  0        ondansetron 4 MG ODT tab   Commonly known as:  ZOFRAN ODT   Dose:  4 mg   Quantity:  10 tablet        Take 1 tablet (4 mg) by mouth every 8 hours as needed for nausea   Refills:  0          Our records show that you are taking the medicines listed below. If these are incorrect, please call your family doctor or clinic.        Dose / Directions Last dose taken    albuterol 108 (90 BASE) MCG/ACT  Inhaler   Commonly known as:  albuterol   Dose:  2 puff   Quantity:  1 Inhaler        Inhale 2 puffs into the lungs every 4 hours as needed for shortness of breath / dyspnea   Refills:  0        ibuprofen 600 MG tablet   Commonly known as:  ADVIL/MOTRIN   Dose:  600 mg   Quantity:  30 tablet        Take 1 tablet (600 mg) by mouth every 6 hours as needed for moderate pain   Refills:  0        LAMICTAL PO   Dose:  25 mg        Take 25 mg by mouth daily   Refills:  0        polyethylene glycol powder   Commonly known as:  MIRALAX/GLYCOLAX   Dose:  17 g   Quantity:  510 g        Take 17 g by mouth daily as needed for constipation   Refills:  3        PRISTIQ PO   Dose:  100 mg        Take 100 mg by mouth daily   Refills:  0        senna 8.6 MG tablet   Commonly known as:  SENOKOT   Dose:  1 tablet   Quantity:  60 tablet        Take 1 tablet by mouth 2 times daily as needed for constipation   Refills:  0        SUMATRIPTAN SUCCINATE PO   Dose:  50 mg        Take 50 mg by mouth once as needed for migraine   Refills:  0        TOPIRAMATE PO        Take 50 mg by mouth in the morning and 100 mg by mouth in the evening   Refills:  0        VITAMIN D3 PO   Dose:  5000 Units        Take 5,000 Units by mouth daily   Refills:  0                Prescriptions were sent or printed at these locations (2 Prescriptions)                   Other Prescriptions                Printed at Department/Unit printer (2 of 2)         ondansetron (ZOFRAN ODT) 4 MG ODT tab               HYDROcodone-acetaminophen (NORCO) 5-325 MG per tablet                Procedures and tests performed during your visit     CBC with platelets differential    CT Abdomen Pelvis w Contrast    Comprehensive metabolic panel    KUB XR    Lactic acid whole blood    Peripheral IV: Standard    Pulse oximetry nursing    UA with Microscopic      Orders Needing Specimen Collection     None      Pending Results     Date and Time Order Name Status Description    5/16/2017 0448 CT  Abdomen Pelvis w Contrast Preliminary             Pending Culture Results     No orders found from 5/14/2017 to 5/17/2017.            Pending Results Instructions     If you had any lab results that were not finalized at the time of your Discharge, you can call the ED Lab Result RN at [phone]. You will be contacted by this team for any positive Lab results or changes in treatment. The nurses are available 7 days a week from 10A to 6:30P.  You can leave a message 24 hours per day and they will return your call.        Thank you for choosing [facility]       Thank you for choosing [facility] for your care. Our goal is always to provide you with excellent care. Hearing back from our patients is one way we can continue to improve our services. Please take a few minutes to complete the written survey that you may receive in the mail after you visit with us. Thank you!        [facility] Information     [employer] gives you secure access to your electronic health record. If you see a primary care provider, you can also send messages to your care team and make appointments. If you have questions, please call your primary care clinic.  If you do not have a primary care provider, please call [phone] and they will assist you.        Care EveryWhere ID     This is your Care EveryWhere ID. This could be used by other organizations to access your [facility] medical records  [id number]        After Visit Summary       This is your record. Keep this with you and show to your community pharmacist(s) and doctor(s) at your next visit.

## 2017-05-16 NOTE — ED AVS SNAPSHOT
[facility], Emergency Department    [address]    [facility] [address]    Phone:  [phone]    Fax:  [phone]                                       Nevin Alvarado   MRN: [id number]    Department:  [facility], Emergency Department   Date of Visit:  5/16/2017           After Visit Summary Signature Page     I have received my discharge instructions, and my questions have been answered. I have discussed any challenges I see with this plan with the nurse or doctor.    ..........................................................................................................................................  Patient/Patient Representative Signature      ..........................................................................................................................................  Patient Representative Print Name and Relationship to Patient    ..................................................               ................................................  Date                                            Time    ..........................................................................................................................................  Reviewed by Signature/Title    ...................................................              ..............................................  Date                                                            Time

## 2017-05-17 NOTE — ED NOTES
Patient called today regarding prescription given to her after her emergency department visit by Dr. nieto.  She states he was given Vicodin and is allergic to that medication.  Upon review of the visit it appears she was seen for abdominal pain of undefined etiology and referred to follow up with her primary physician.  Patient is advised that unfortunately I cannot call in prescription for opiate pain medications without seeing the patient.  She was advised not to take any medication she is allergic to and to try other over-the-counter medications for pain and to follow-up immediately with medical provider if she requires stronger pain medication.     Barney Garcia MD  05/17/17 2253

## 2017-05-19 ENCOUNTER — OFFICE VISIT (OUTPATIENT)
Dept: INTERNAL MEDICINE | Facility: CLINIC | Age: 27
End: 2017-05-19
Payer: MEDICARE

## 2017-05-19 ENCOUNTER — OFFICE VISIT (OUTPATIENT)
Dept: DERMATOLOGY | Facility: CLINIC | Age: 27
End: 2017-05-19
Payer: MEDICARE

## 2017-05-19 VITALS
DIASTOLIC BLOOD PRESSURE: 70 MMHG | HEIGHT: 62 IN | OXYGEN SATURATION: 97 % | TEMPERATURE: 98.8 F | BODY MASS INDEX: 40.74 KG/M2 | WEIGHT: 221.4 LBS | SYSTOLIC BLOOD PRESSURE: 124 MMHG | HEART RATE: 84 BPM

## 2017-05-19 VITALS — OXYGEN SATURATION: 97 % | SYSTOLIC BLOOD PRESSURE: 118 MMHG | HEART RATE: 88 BPM | DIASTOLIC BLOOD PRESSURE: 80 MMHG

## 2017-05-19 DIAGNOSIS — L94.0 MORPHEA: Primary | ICD-10-CM

## 2017-05-19 DIAGNOSIS — R10.30 LOWER ABDOMINAL PAIN: Primary | ICD-10-CM

## 2017-05-19 PROCEDURE — 99212 OFFICE O/P EST SF 10 MIN: CPT | Mod: 25 | Performed by: PHYSICIAN ASSISTANT

## 2017-05-19 PROCEDURE — 11900 INJECT SKIN LESIONS </W 7: CPT | Performed by: PHYSICIAN ASSISTANT

## 2017-05-19 PROCEDURE — 99214 OFFICE O/P EST MOD 30 MIN: CPT | Performed by: INTERNAL MEDICINE

## 2017-05-19 RX ORDER — BETAMETHASONE DIPROPIONATE 0.5 MG/G
OINTMENT, AUGMENTED TOPICAL
Qty: 45 G | Refills: 3 | Status: ON HOLD | OUTPATIENT
Start: 2017-05-19 | End: 2017-07-20

## 2017-05-19 RX ORDER — TRAMADOL HYDROCHLORIDE 50 MG/1
5 TABLET ORAL PRN
COMMUNITY
Start: 2017-05-01 | End: 2017-07-11

## 2017-05-19 NOTE — MR AVS SNAPSHOT
After Visit Summary   5/19/2017    Nevin Alvarado    MRN: [id number]           Patient Information     Date Of Birth          1990        Visit Information        Provider Department      5/19/2017 11:15 AM Sandra Ramos MD [facility]        Today's Diagnoses     Lower abdominal pain    -  1      Care Instructions    Please schedule pelvic ultrasound on your way out.    Recommendations to follow.         Follow-ups after your visit        Future tests that were ordered for you today     Open Future Orders        Priority Expected Expires Ordered    US Pelvic Complete w Transvaginal Routine  5/19/2018 5/19/2017            Who to contact     If you have questions or need follow up information about today's clinic visit or your schedule please contact [facility] directly at [phone].  Normal or non-critical lab and imaging results will be communicated to you by MyChart, letter or phone within 4 business days after the clinic has received the results. If you do not hear from us within 7 days, please contact the clinic through MyChart or phone. If you have a critical or abnormal lab result, we will notify you by phone as soon as possible.  Submit refill requests through [employer] or call your pharmacy and they will forward the refill request to us. Please allow 3 business days for your refill to be completed.          Additional Information About Your Visit        MyChart Information     [employer] gives you secure access to your electronic health record. If you see a primary care provider, you can also send messages to your care team and make appointments. If you have questions, please call your primary care clinic.  If you do not have a primary care provider, please call [phone] and they will assist you.        Care EveryWhere ID     This is your Care EveryWhere ID. This could be used by other organizations to access your [place]  "medical records  [id number]        Your Vitals Were     Pulse Temperature Height Pulse Oximetry BMI (Body Mass Index)       84 98.8  F (37.1  C) (Oral) 5' 2\" (1.575 m) 97% 40.49 kg/m2        Blood Pressure from Last 3 Encounters:   05/19/17 124/70   05/19/17 118/80   05/16/17 (!) 132/95    Weight from Last 3 Encounters:   05/19/17 221 lb 6.4 oz (100.4 kg)   05/16/17 224 lb 3.2 oz (101.7 kg)   05/11/17 223 lb 15.8 oz (101.6 kg)                 Today's Medication Changes          These changes are accurate as of: 5/19/17 11:19 AM.  If you have any questions, ask your nurse or doctor.               Start taking these medicines.        Dose/Directions    augmented betamethasone dipropionate 0.05 % ointment   Commonly known as:  DIPROLENE-AF   Used for:  Acute dermatitis   Started by:  Carli Thurman PA-C        Apply to AA BID x 2-3 weeks then PRN only   Quantity:  45 g   Refills:  3         Stop taking these medicines if you haven't already. Please contact your care team if you have questions.     HYDROcodone-acetaminophen 5-325 MG per tablet   Commonly known as:  NORCO   Stopped by:  Sandra Ramos MD                Where to get your medicines      Some of these will need a paper prescription and others can be bought over the counter.  Ask your nurse if you have questions.     Bring a paper prescription for each of these medications     augmented betamethasone dipropionate 0.05 % ointment                Primary Care Provider Office Phone # Fax #    Sandra Ramos [phone]       [facility] [address] [facility] [address]        Thank you!     Thank you for choosing [facility]  for your care. Our goal is always to provide you with excellent care. Hearing back from our patients is one way we can continue to improve our services. Please take a few minutes to complete the written survey that you may receive in the mail after your visit with " us. Thank you!             Your Updated Medication List - Protect others around you: Learn how to safely use, store and throw away your medicines at www.disposemymeds.org.          This list is accurate as of: 5/19/17 11:19 AM.  Always use your most recent med list.                   Brand Name Dispense Instructions for use    albuterol 108 (90 BASE) MCG/ACT Inhaler    albuterol    1 Inhaler    Inhale 2 puffs into the lungs every 4 hours as needed for shortness of breath / dyspnea       augmented betamethasone dipropionate 0.05 % ointment    DIPROLENE-AF    45 g    Apply to AA BID x 2-3 weeks then PRN only       ibuprofen 600 MG tablet    ADVIL/MOTRIN    30 tablet    Take 1 tablet (600 mg) by mouth every 6 hours as needed for moderate pain       LAMICTAL PO      Take 25 mg by mouth daily       levonorgestrel 20 MCG/24HR IUD    MIRENA     1 each (20 mcg) by Intrauterine route once for 1 dose       ondansetron 4 MG ODT tab    ZOFRAN ODT    10 tablet    Take 1 tablet (4 mg) by mouth every 8 hours as needed for nausea       polyethylene glycol powder    MIRALAX/GLYCOLAX    510 g    Take 17 g by mouth daily as needed for constipation       PRISTIQ PO      Take 100 mg by mouth daily       senna 8.6 MG tablet    SENOKOT    60 tablet    Take 1 tablet by mouth 2 times daily as needed for constipation       SUMATRIPTAN SUCCINATE PO      Take 50 mg by mouth once as needed for migraine Reported on 5/19/2017       TOPIRAMATE PO      Take 50 mg by mouth in the morning and 100 mg by mouth in the evening       traMADol 50 MG tablet    ULTRAM     Take 5 mg by mouth as needed       VITAMIN D3 PO      Take 5,000 Units by mouth daily

## 2017-05-19 NOTE — NURSING NOTE
"Chief Complaint   Patient presents with     ER F/U     5/11/17 - [facility] for abdoominal pain, nausea and vomiting. 5/16/17 - [facility] for abdominal pain, nausea and vomiting.       Initial /70 (BP Location: Left arm, Patient Position: Chair, Cuff Size: Adult Regular)  Pulse 84  Temp 98.8  F (37.1  C) (Oral)  Ht 5' 2\" (1.575 m)  Wt 221 lb 6.4 oz (100.4 kg)  SpO2 97%  BMI 40.49 kg/m2 Estimated body mass index is 40.49 kg/(m^2) as calculated from the following:    Height as of this encounter: 5' 2\" (1.575 m).    Weight as of this encounter: 221 lb 6.4 oz (100.4 kg).  Medication Reconciliation: complete       Kaminibose MA      "

## 2017-05-19 NOTE — PROGRESS NOTES
"  SUBJECTIVE:                                                      HPI: Nevin Alvarado is a pleasant 26 year old female who presents for ER follow-up:    Since our last visit, patient has been seen in [place] ERs 10 (ten) times and several times at outside ERs.    ER visits have been for multiple complaints including abdominal pain, pelvic pain, headaches, URIs, and worsening depression.    Patient reports that her only ongoing symptom now is abdominal pain:  - bilateral lower abdomen  - ongoing, continuously, for the last ~1.5 months  - describes as sharp and crampy in quality  - mild to moderate in severity  - no known exacerbating factors - not worse with activity, position, or meals  - \"curling up in a ball\" and using a heating pad helped with pain, but only a little   - no improvement with bowel movement or OTC pain medications  - associated nausea, no vomiting  - associated, generalized fatigue - but may be related to recently starting Lamictal    - BMs are normal: Daily, soft, brown, formed, easy to evacuate; no melena or hematochezia  - no urinary frequency, urgency, or dysuria  - no fevers or chills  - no weight loss or gain    As Mirena IUD in place (placed July, 2015) - does not get periods.    Recent labs (from 3 days ago) reviewed and unremarkable.    Recent CT abdomen/pelvis and KUB unremarkable other than left ovarian cyst or cystic lesion.    PMH significant for endometriosis and multiple recent episodes of rectal foreign body.    PSH significant for exploratory laparotomy in January, 2017 for rectal foreign body.     The medication, allergy, and problem lists have been reviewed and updated as appropriate.       OBJECTIVE:                                                      /70 (BP Location: Left arm, Patient Position: Chair, Cuff Size: Adult Regular)  Pulse 84  Temp 98.8  F (37.1  C) (Oral)  Ht 5' 2\" (1.575 m)  Wt 221 lb 6.4 oz (100.4 kg)  SpO2 97%  BMI 40.49 " kg/m2  Constitutional: well-appearing - appears comfortable   Respiratory: normal respiratory effort; clear to auscultation bilaterally  Cardiovascular: regular rate and rhythm; no edema  Gastrointestinal: soft, mildly tender throughout, non-distended, and bowel sounds present; no guarding or rebound   Musculoskeletal: normal gait and station  Psych: normal judgment and insight; normal mood and affect; recent and remote memory intact      ASSESSMENT/PLAN:                                                      (R10.30) Lower abdominal pain  (primary encounter diagnosis)  Comment:   - etiology unclear.   - recent workup including imaging studies and labs unremarkable.   - exam reassuring.   Plan:    - will hold off on additional imaging and labs at this time.   - ultrasound pelvic complete ordered - patient to schedule.   - recommend continued warm compresses as needed for comfort.    - recommend avoiding NSAIDs and narcotics (NSAIDs may cause GI upset, narcotics may cause constipation).   - patient encouraged to follow-up with me for concerning symptoms (and try to avoid ER visits if possible).    The instructions on the AVS were discussed and explained to the patient. Patient expressed understanding of instructions.    A total of 25 minutes were spent face-to-face with this patient during this encounter and over half of that time was spent on counseling and coordination of care re: above diagnoses and plans of care.     Sandra Ramos MD   [place] [address] [address] [address]  T: [phone], F: [phone]

## 2017-05-19 NOTE — NURSING NOTE
"Chief Complaint   Patient presents with     Derm Problem     f/u morphea       Initial /80  Pulse 88  SpO2 97% Estimated body mass index is 41.01 kg/(m^2) as calculated from the following:    Height as of 5/16/17: 1.575 m (5' 2\").    Weight as of 5/16/17: 101.7 kg (224 lb 3.2 oz).  Medication Reconciliation: complete    "

## 2017-05-19 NOTE — PROGRESS NOTES
HPI:   Nevin Alvarado is a 26 year old female who presents for recheck of biopsy proven morphea. Had ILK at [facility]OV ad has not noticed any new areas. Has been using betamethasone cream BID to the pink areas on the lower legs but doesn't seem to be changing anything.   chief complaint  Location: bilateral lower legs   Condition present for:  About 1 year.   Previous treatments include: had topical creams prescribed but doesn't remember the names of them     Review Of Systems  Eyes: negative  Ears/Nose/Throat: negative  Respiratory: No shortness of breath, dyspnea on exertion, cough, or hemoptysis  Cardiovascular: negative  Gastrointestinal: negative  Genitourinary: negative  Musculoskeletal: negative  Neurologic: negative  Psychiatric: negative        PHYSICAL EXAM:      Skin exam performed as follows: Type 2 skin. Mood appropriate  Alert and Oriented X 3. Well developed, well nourished in no distress.  General appearance: Normal  Head including face: Normal  Eyes: conjunctiva and lids: Normal  Mouth: Lips, teeth, gums: Normal  Neck: Normal  Chest-breast/axillae: Normal  Back: Normal  Spleen and liver: Normal  Cardiovascular: Exam of peripheral vascular system by observation for swelling, varicosities, edema: Normal  Genitalia: groin, buttocks: Normal  Extremities: digits/nails (clubbing): Normal  Eccrine and Apocrine glands: Normal  Right upper extremity: Normal  Left upper extremity: Normal  Right lower extremity: Normal  Left lower extremity: Normal  Skin: Scalp and body hair: See below    1. Pink slightly atrophic patches on bilateral shins    ASSESSMENT/PLAN:     1. Biopsy proven morphea on bilateral shins - 1 active area today on left shin but the rest seems calm after ILK x 1. Discussed pathophysiology of morphea and potential for new lesions and scarring.   --Kenalog 10 mg/cc injected to left shin x 1. Total of 2 cc's used.  Advised on risk of atrophy and failure to respond. Advised on aftercare.  --Start  gentle skin care - written instructions provided  --Hold betamethasone for now - can resume this BID for any new areas        Follow-up: yearly if doing well/PRN sooner  CC:   Scribed By: Carli Thurman MS, PA-C

## 2017-05-19 NOTE — MR AVS SNAPSHOT
After Visit Summary   5/19/2017    Nevin Alvarado    MRN: [id number]           Patient Information     Date Of Birth          1990        Visit Information        Provider Department      5/19/2017 10:15 AM Carli Thurman PA-C [facility]        Today's Diagnoses     Morphea    -  1       Follow-ups after your visit        Your next 10 appointments already scheduled     May 23, 2017  3:30 PM CDT   US PELVIC COMPLETE W TRANSVAGINAL with OXUS1   [facility] ([facility])    [address] [place] [address] [phone]           Please bring a list of your medicines (including vitamins, minerals and over-the-counter drugs). Also, tell your doctor about any allergies you may have. Wear comfortable clothes and leave your valuables at home.  Adults: Drink six 8-ounce glasses of fluid one hour before your exam. Do NOT empty your bladder.  If you need to empty your bladder before your exam, try to release only a little bit of urine. Then, drink another 8oz glass of fluid.  Children: Children who are potty trained should drink at least 4 cups (32 oz) of liquid 45 minutes to one hour prior to the exam. The child s bladder must be full in order to achieve a diagnostic exam. If your child is very uncomfortable or has an urgent need to pee, please notify a technologist; they will try to find out how much longer the wait may be and provide instructions to help relieve the pressure. Occasionally it is medically necessary to insert a urinary catheter to fill the bladder.  Please call the Imaging Department at your exam site with any questions.              Future tests that were ordered for you today     Open Future Orders        Priority Expected Expires Ordered    US Pelvic Complete w Transvaginal Routine  5/19/2018 5/19/2017            Who to contact     If you have questions or need follow up information  about today's clinic visit or your schedule please contact [facility] directly at [phone].  Normal or non-critical lab and imaging results will be communicated to you by [employer]Chart, letter or phone within 4 business days after the clinic has received the results. If you do not hear from us within 7 days, please contact the clinic through [employer] LLChart or phone. If you have a critical or abnormal lab result, we will notify you by phone as soon as possible.  Submit refill requests through [employer] or call your pharmacy and they will forward the refill request to us. Please allow 3 business days for your refill to be completed.          Additional Information About Your Visit        [employer] Information     [employer] gives you secure access to your electronic health record. If you see a primary care provider, you can also send messages to your care team and make appointments. If you have questions, please call your primary care clinic.  If you do not have a primary care provider, please call [phone] and they will assist you.        Care EveryWhere ID     This is your Care EveryWhere ID. This could be used by other organizations to access your [place] medical records  [id number]        Your Vitals Were     Pulse Pulse Oximetry                88 97%           Blood Pressure from Last 3 Encounters:   05/19/17 124/70   05/19/17 118/80   05/16/17 (!) 132/95    Weight from Last 3 Encounters:   05/19/17 100.4 kg (221 lb 6.4 oz)   05/16/17 101.7 kg (224 lb 3.2 oz)   05/11/17 101.6 kg (223 lb 15.8 oz)              We Performed the Following     INJECTION INTO SKIN LESIONS <=7     KENALOG PER 10 MG          Today's Medication Changes          These changes are accurate as of: 5/19/17 12:17 PM.  If you have any questions, ask your nurse or doctor.               Start taking these medicines.        Dose/Directions    augmented betamethasone dipropionate 0.05 % ointment   Commonly known as:  DIPROLENE-AF    Started by:  Carli Thurman PA-C        Apply to AA BID x 2-3 weeks then PRN only   Quantity:  45 g   Refills:  3         Stop taking these medicines if you haven't already. Please contact your care team if you have questions.     HYDROcodone-acetaminophen 5-325 MG per tablet   Commonly known as:  NORCO   Stopped by:  Sandra Ramos MD                Where to get your medicines      Some of these will need a paper prescription and others can be bought over the counter.  Ask your nurse if you have questions.     Bring a paper prescription for each of these medications     augmented betamethasone dipropionate 0.05 % ointment                Primary Care Provider Office Phone # Fax #    Sandra Ramos [phone]       [facility] [address] [facility] [address]        Thank you!     Thank you for choosing [facility]  for your care. Our goal is always to provide you with excellent care. Hearing back from our patients is one way we can continue to improve our services. Please take a few minutes to complete the written survey that you may receive in the mail after your visit with us. Thank you!             Your Updated Medication List - Protect others around you: Learn how to safely use, store and throw away your medicines at www.disposemymeds.org.          This list is accurate as of: 5/19/17 12:17 PM.  Always use your most recent med list.                   Brand Name Dispense Instructions for use    albuterol 108 (90 BASE) MCG/ACT Inhaler    albuterol    1 Inhaler    Inhale 2 puffs into the lungs every 4 hours as needed for shortness of breath / dyspnea       augmented betamethasone dipropionate 0.05 % ointment    DIPROLENE-AF    45 g    Apply to AA BID x 2-3 weeks then PRN only       ibuprofen 600 MG tablet    ADVIL/MOTRIN    30 tablet    Take 1 tablet (600 mg) by mouth every 6 hours as needed for moderate pain       LAMICTAL PO      Take 25  mg by mouth daily       levonorgestrel 20 MCG/24HR IUD    MIRENA     1 each (20 mcg) by Intrauterine route once for 1 dose       ondansetron 4 MG ODT tab    ZOFRAN ODT    10 tablet    Take 1 tablet (4 mg) by mouth every 8 hours as needed for nausea       polyethylene glycol powder    MIRALAX/GLYCOLAX    510 g    Take 17 g by mouth daily as needed for constipation       PRISTIQ PO      Take 100 mg by mouth daily       senna 8.6 MG tablet    SENOKOT    60 tablet    Take 1 tablet by mouth 2 times daily as needed for constipation       SUMATRIPTAN SUCCINATE PO      Take 50 mg by mouth once as needed for migraine Reported on 5/19/2017       TOPIRAMATE PO      Take 50 mg by mouth in the morning and 100 mg by mouth in the evening       traMADol 50 MG tablet    ULTRAM     Take 5 mg by mouth as needed       VITAMIN D3 PO      Take 5,000 Units by mouth daily

## 2017-05-23 LAB — PHQ9 SCORE: 17

## 2017-05-24 ENCOUNTER — RADIANT APPOINTMENT (OUTPATIENT)
Dept: ULTRASOUND IMAGING | Facility: CLINIC | Age: 27
End: 2017-05-24
Payer: MEDICARE

## 2017-05-24 DIAGNOSIS — R10.30 LOWER ABDOMINAL PAIN: ICD-10-CM

## 2017-05-24 PROCEDURE — 76856 US EXAM PELVIC COMPLETE: CPT | Performed by: OBSTETRICS & GYNECOLOGY

## 2017-05-24 PROCEDURE — 76830 TRANSVAGINAL US NON-OB: CPT | Mod: 59 | Performed by: OBSTETRICS & GYNECOLOGY

## 2017-05-26 ENCOUNTER — TELEPHONE (OUTPATIENT)
Dept: INTERNAL MEDICINE | Facility: CLINIC | Age: 27
End: 2017-05-26

## 2017-05-26 DIAGNOSIS — R10.30 LOWER ABDOMINAL PAIN: Primary | ICD-10-CM

## 2017-05-26 DIAGNOSIS — N83.202 LEFT OVARIAN CYST: Primary | ICD-10-CM

## 2017-05-26 NOTE — TELEPHONE ENCOUNTER
Call received from pt requesting US results. She is upset because she was told the results would be available today. Routed to Dr. Lanza. Please advise.

## 2017-05-26 NOTE — TELEPHONE ENCOUNTER
Pt would like some pain medication, and if she can not get any she would like some tylenol prescribed for her. She states she wants her ultrasound results but the results are not in her chart yet. Please advise for medication.

## 2017-05-26 NOTE — TELEPHONE ENCOUNTER
Her ultrasound has not been read by the radiologist yet and I am unable to interpret it.    I have sent a Rx for Tylenol to the pharmacy.

## 2017-05-26 NOTE — TELEPHONE ENCOUNTER
Reason for call:  Results   Name of test or procedure: Ultrasound  Date of test or procedure: 05/24/2017  Location of test or procedure: [facility]    Additional comments: please call patient with results.    Phone number to reach patient:  Home number on file [phone] (home)    Best Time:  anytime    Can we leave a detailed message on this number?  YES

## 2017-05-27 ENCOUNTER — HOSPITAL ENCOUNTER (EMERGENCY)
Facility: CLINIC | Age: 27
Discharge: HOME | End: 2017-05-28
Attending: EMERGENCY MEDICINE | Admitting: EMERGENCY MEDICINE
Payer: MEDICARE

## 2017-05-27 DIAGNOSIS — R10.84 ABDOMINAL PAIN, GENERALIZED: ICD-10-CM

## 2017-05-27 PROCEDURE — 96372 THER/PROPH/DIAG INJ SC/IM: CPT

## 2017-05-27 PROCEDURE — 99284 EMERGENCY DEPT VISIT MOD MDM: CPT | Mod: Z6 | Performed by: EMERGENCY MEDICINE

## 2017-05-27 PROCEDURE — 99284 EMERGENCY DEPT VISIT MOD MDM: CPT | Mod: 25

## 2017-05-27 RX ORDER — KETOROLAC TROMETHAMINE 30 MG/ML
30 INJECTION, SOLUTION INTRAMUSCULAR; INTRAVENOUS ONCE
Status: COMPLETED | OUTPATIENT
Start: 2017-05-27 | End: 2017-05-28

## 2017-05-27 NOTE — ED AVS SNAPSHOT
[facility], Emergency Department    [address]    [facility] [address]    Phone:  [phone]    Fax:  [phone]                                       Nevin Alvarado   MRN: [id number]    Department:  [facility], Emergency Department   Date of Visit:  5/27/2017           After Visit Summary Signature Page     I have received my discharge instructions, and my questions have been answered. I have discussed any challenges I see with this plan with the nurse or doctor.    ..........................................................................................................................................  Patient/Patient Representative Signature      ..........................................................................................................................................  Patient Representative Print Name and Relationship to Patient    ..................................................               ................................................  Date                                            Time    ..........................................................................................................................................  Reviewed by Signature/Title    ...................................................              ..............................................  Date                                                            Time

## 2017-05-27 NOTE — ED AVS SNAPSHOT
[facility], Emergency Department    [address]    [place] [address]    Phone:  [phone]    Fax:  [phone]                                       Nevin Alvarado   MRN: [id number]    Department:  [facility], Emergency Department   Date of Visit:  5/27/2017           Patient Information     Date Of Birth          1990        Your diagnoses for this visit were:     Abdominal pain, generalized        You were seen by Matt Jane MD.      Follow-up Information     Follow up with Sandra Ramos MD.    Specialty:  Internal Medicine    Why:  As needed, If symptoms worsen    Contact information:    [facility]  [address]  [facility] [address] [phone]          Follow up with [facility], Emergency Department.    Specialty:  EMERGENCY MEDICINE    Why:  As needed, If symptoms worsen    Contact information:    [address]  [facility] [address] [phone]    Additional information:    The [facility] is located in the [facility]. lt is easily accessible from virtually any point in the [facility] area, via [address]        Follow up with [facility] In 1 week.    Contact information:    [address]  [address] [address] [phone]        Discharge Instructions       Please make an appointment to follow up with Your Primary Care Provider in 4 days.  Please make an appointment to follow up with OB/Gyn--[facility] (phone: [phone]) or OB/Gyn--[facility] (phone: [phone]). Call for an appointment.      Abdominal Pain  Abdominal pain is pain in the stomach or intestinal area. Everyone has this pain from time to time. In many cases it goes away on its own. But abdominal pain can sometimes be due to a serious problem, such as appendicitis. So it s important to know when to seek help.  Causes of abdominal pain  There are many  possible causes of abdominal pain. Common causes in adults include:    Constipation, diarrhea, or gas    GERD (gastroesophageal reflux disease) movement of stomach acid into the esophagus, also known as acid reflux or heartburn    Peptic ulcer (a sore in the lining of the stomach or small intestine)    Inflammation of the gallbladder, liver, or pancreas    Gallstones or kidney stones    Appendicitis     Obstruction of the intestines     Hernia (bulging of an internal organ through a muscle or other tissue)    Urinary tract infections    In women, menstrual cramps, fibroids, or endometriosis of the uterus    Inflammation or infection of the intestines  Diagnosing the cause of abdominal pain  Your health care provider will examine you to help find the cause of your pain. If needed, tests will be ordered. Because abdominal pain has so many possible causes, it can be hard to discover the reason for the pain. Giving details about your pain can help. Be ready to tell your health care provider where and when you feel the pain and what makes it better or worse. Also mention whether you have other symptoms such as fever, tiredness, nausea, vomiting, or changes in bathroom habits.  Treating abdominal pain  Certain causes of pain, such as appendicitis or a bowel obstruction, need emergency treatment. Other problems can be treated with rest, fluids, or medications. Your health care provider can give you specific instructions for treatment or self-care based on the cause of your pain.  If you have vomiting or diarrhea, sip water or other clear fluids. When you are ready to eat solid foods again, start with small amounts of easy-to-digest, low-fat foods, such as applesauce, toast, or crackers.   When to call the doctor  Call 911 or go to the hospital right away if you:    Can t pass stool and are vomiting    Are vomiting blood or have black, tarry diarrhea    Also have chest, neck, or shoulder pain    Feel like you are about to  pass out    Have pain in your shoulder blades with nausea    Have sudden, excruciating abdominal pain    Have new, severe pain unlike any you have felt before    Have a belly that is rigid, hard, and tender to touch  Call your doctor if you have:    Pain for more than 5 days    Bloating for more than 2 days    Diarrhea for more than 5 days    Fever of 101 F (38.3 C) or higher    Pain that continues to worsen    Unexplained weight loss    Continued lack of appetite    Blood in the stool  How to prevent abdominal pain  Here are some tips to help prevent abdominal pain:    Eat smaller amounts of food at one time.    Avoid greasy, fried, or other high-fat foods.    Avoid foods that give you gas.    Exercise regularly.    Drink plenty of fluids.  To help prevent symptoms of gastroesophageal reflux disease (GERD):    Quit smoking.    Reduce alcohol and certain foods that increase stomach acid.     Lose excess weight.    Finish eating at least 2 hours before you go to bed or lie down.    Elevate the head of your bed.    1176-9723 [employer]. [address] [place] [address], [place] [address]. All rights reserved. This information is not intended as a substitute for professional medical care. Always follow your healthcare professional's instructions.            24 Hour Appointment Hotline       To make an appointment at any [facility], call [phone] ([phone]). If you don't have a family doctor or clinic, we will help you find one. [facility] are conveniently located to serve the needs of you and your family.             Review of your medicines      Our records show that you are taking the medicines listed below. If these are incorrect, please call your family doctor or clinic.        Dose / Directions Last dose taken    Acetaminophen 325 MG Caps   Dose:  1-2 capsule   Quantity:  100 capsule        Take 1-2 capsules by mouth every 6 hours as needed   Refills:  0        albuterol 108 (90 BASE)  MCG/ACT Inhaler   Commonly known as:  albuterol   Dose:  2 puff   Quantity:  1 Inhaler        Inhale 2 puffs into the lungs every 4 hours as needed for shortness of breath / dyspnea   Refills:  0        augmented betamethasone dipropionate 0.05 % ointment   Commonly known as:  DIPROLENE-AF   Quantity:  45 g        Apply to AA BID x 2-3 weeks then PRN only   Refills:  3        ibuprofen 600 MG tablet   Commonly known as:  ADVIL/MOTRIN   Dose:  600 mg   Quantity:  30 tablet        Take 1 tablet (600 mg) by mouth every 6 hours as needed for moderate pain   Refills:  0        LAMICTAL PO   Dose:  50 mg        Take 50 mg by mouth daily   Refills:  0        levonorgestrel 20 MCG/24HR IUD   Commonly known as:  MIRENA   Dose:  1 each        1 each (20 mcg) by Intrauterine route once for 1 dose   Refills:  0        polyethylene glycol powder   Commonly known as:  MIRALAX/GLYCOLAX   Dose:  17 g   Quantity:  510 g        Take 17 g by mouth daily as needed for constipation   Refills:  3        PRISTIQ PO   Dose:  100 mg        Take 100 mg by mouth daily   Refills:  0        senna 8.6 MG tablet   Commonly known as:  SENOKOT   Dose:  1 tablet   Quantity:  60 tablet        Take 1 tablet by mouth 2 times daily as needed for constipation   Refills:  0        SUMATRIPTAN SUCCINATE PO   Dose:  50 mg        Take 50 mg by mouth once as needed for migraine Reported on 5/19/2017   Refills:  0        TOPIRAMATE PO        Take 50 mg by mouth in the morning and 100 mg by mouth in the evening   Refills:  0        traMADol 50 MG tablet   Commonly known as:  ULTRAM   Dose:  5 mg        Take 5 mg by mouth as needed   Refills:  0        VITAMIN D3 PO   Dose:  5000 Units        Take 5,000 Units by mouth daily   Refills:  0                Procedures and tests performed during your visit     Basic metabolic panel    Lactic acid whole blood      Orders Needing Specimen Collection     None      Pending Results     No orders found for last 3 day(s).             Pending Culture Results     No orders found for last 3 day(s).            Pending Results Instructions     If you had any lab results that were not finalized at the time of your Discharge, you can call the ED Lab Result RN at [phone]. You will be contacted by this team for any positive Lab results or changes in treatment. The nurses are available 7 days a week from 10A to 6:30P.  You can leave a message 24 hours per day and they will return your call.        Thank you for choosing [facility]       Thank you for choosing [facility] for your care. Our goal is always to provide you with excellent care. Hearing back from our patients is one way we can continue to improve our services. Please take a few minutes to complete the written survey that you may receive in the mail after you visit with us. Thank you!        [employer] Information     [employer] gives you secure access to your electronic health record. If you see a primary care provider, you can also send messages to your care team and make appointments. If you have questions, please call your primary care clinic.  If you do not have a primary care provider, please call [phone] and they will assist you.        Care EveryWhere ID     This is your Care EveryWhere ID. This could be used by other organizations to access your [facility] medical records  [id number]        After Visit Summary       This is your record. Keep this with you and show to your community pharmacist(s) and doctor(s) at your next visit.

## 2017-05-28 VITALS
DIASTOLIC BLOOD PRESSURE: 86 MMHG | BODY MASS INDEX: 40.9 KG/M2 | OXYGEN SATURATION: 100 % | TEMPERATURE: 98 F | WEIGHT: 223.6 LBS | SYSTOLIC BLOOD PRESSURE: 123 MMHG | RESPIRATION RATE: 16 BRPM | HEART RATE: 97 BPM

## 2017-05-28 LAB
ANION GAP SERPL CALCULATED.3IONS-SCNC: 6 MMOL/L (ref 3–14)
BUN SERPL-MCNC: 18 MG/DL (ref 7–30)
CALCIUM SERPL-MCNC: 8.7 MG/DL (ref 8.5–10.1)
CHLORIDE SERPL-SCNC: 111 MMOL/L (ref 94–109)
CO2 SERPL-SCNC: 24 MMOL/L (ref 20–32)
CREAT SERPL-MCNC: 0.6 MG/DL (ref 0.52–1.04)
GFR SERPL CREATININE-BSD FRML MDRD: ABNORMAL ML/MIN/1.7M2
GLUCOSE SERPL-MCNC: 99 MG/DL (ref 70–99)
LACTATE BLD-SCNC: 0.8 MMOL/L (ref 0.7–2.1)
POTASSIUM SERPL-SCNC: 3.9 MMOL/L (ref 3.4–5.3)
SODIUM SERPL-SCNC: 141 MMOL/L (ref 133–144)

## 2017-05-28 PROCEDURE — 83605 ASSAY OF LACTIC ACID: CPT | Performed by: EMERGENCY MEDICINE

## 2017-05-28 PROCEDURE — 96372 THER/PROPH/DIAG INJ SC/IM: CPT

## 2017-05-28 PROCEDURE — 80048 BASIC METABOLIC PNL TOTAL CA: CPT | Performed by: EMERGENCY MEDICINE

## 2017-05-28 PROCEDURE — 25000128 H RX IP 250 OP 636: Performed by: EMERGENCY MEDICINE

## 2017-05-28 RX ADMIN — KETOROLAC TROMETHAMINE 30 MG: 30 INJECTION, SOLUTION INTRAMUSCULAR at 00:06

## 2017-05-28 ASSESSMENT — ENCOUNTER SYMPTOMS
NAUSEA: 1
FEVER: 0
ABDOMINAL PAIN: 1
WHEEZING: 0
COLOR CHANGE: 0
DIAPHORESIS: 0
BACK PAIN: 1
SHORTNESS OF BREATH: 0
DYSURIA: 0

## 2017-05-28 NOTE — DISCHARGE INSTRUCTIONS
Please make an appointment to follow up with Your Primary Care Provider in 4 days.  Please make an appointment to follow up with OB/Gyn--[facility] (phone: [phone]) or OB/Gyn--[facility] (phone: [phone]). Call for an appointment.      Abdominal Pain  Abdominal pain is pain in the stomach or intestinal area. Everyone has this pain from time to time. In many cases it goes away on its own. But abdominal pain can sometimes be due to a serious problem, such as appendicitis. So it s important to know when to seek help.  Causes of abdominal pain  There are many possible causes of abdominal pain. Common causes in adults include:    Constipation, diarrhea, or gas    GERD (gastroesophageal reflux disease) movement of stomach acid into the esophagus, also known as acid reflux or heartburn    Peptic ulcer (a sore in the lining of the stomach or small intestine)    Inflammation of the gallbladder, liver, or pancreas    Gallstones or kidney stones    Appendicitis     Obstruction of the intestines     Hernia (bulging of an internal organ through a muscle or other tissue)    Urinary tract infections    In women, menstrual cramps, fibroids, or endometriosis of the uterus    Inflammation or infection of the intestines  Diagnosing the cause of abdominal pain  Your health care provider will examine you to help find the cause of your pain. If needed, tests will be ordered. Because abdominal pain has so many possible causes, it can be hard to discover the reason for the pain. Giving details about your pain can help. Be ready to tell your health care provider where and when you feel the pain and what makes it better or worse. Also mention whether you have other symptoms such as fever, tiredness, nausea, vomiting, or changes in bathroom habits.  Treating abdominal pain  Certain causes of pain, such as appendicitis or a bowel obstruction, need emergency treatment. Other problems can be treated with  rest, fluids, or medications. Your health care provider can give you specific instructions for treatment or self-care based on the cause of your pain.  If you have vomiting or diarrhea, sip water or other clear fluids. When you are ready to eat solid foods again, start with small amounts of easy-to-digest, low-fat foods, such as applesauce, toast, or crackers.   When to call the doctor  Call 911 or go to the hospital right away if you:    Can t pass stool and are vomiting    Are vomiting blood or have black, tarry diarrhea    Also have chest, neck, or shoulder pain    Feel like you are about to pass out    Have pain in your shoulder blades with nausea    Have sudden, excruciating abdominal pain    Have new, severe pain unlike any you have felt before    Have a belly that is rigid, hard, and tender to touch  Call your doctor if you have:    Pain for more than 5 days    Bloating for more than 2 days    Diarrhea for more than 5 days    Fever of 101 F (38.3 C) or higher    Pain that continues to worsen    Unexplained weight loss    Continued lack of appetite    Blood in the stool  How to prevent abdominal pain  Here are some tips to help prevent abdominal pain:    Eat smaller amounts of food at one time.    Avoid greasy, fried, or other high-fat foods.    Avoid foods that give you gas.    Exercise regularly.    Drink plenty of fluids.  To help prevent symptoms of gastroesophageal reflux disease (GERD):    Quit smoking.    Reduce alcohol and certain foods that increase stomach acid.     Lose excess weight.    Finish eating at least 2 hours before you go to bed or lie down.    Elevate the head of your bed.    [id number] [employer]. [address] [place] [address], [place] [address]. All rights reserved. This information is not intended as a substitute for professional medical care. Always follow your healthcare professional's instructions.

## 2017-05-28 NOTE — ED PROVIDER NOTES
"  History     Chief Complaint   Patient presents with     Abdominal Pain     Onset \"a long time ago\" with diffuse abdominal pain, back pain, nausea, \"it is getting worse and gets so bad I can't even get out of bed.\"     HPI  Nevin Alvarado is a 26 year old female with a history of endometriosis who presents to the Emergency Department for evaluation of abdominal pain, back pain, and nausea. Patient states that she has been experiencing severe lower abdominal pain and lower back pain for the past 4-6 weeks. She describes the abdominal pain as feeling like \"electric shocks\" all across her lower abdomen. She states that the pain is so severe at times that she is unable to get out of bed in the morning. She denies vaginal discharge but states she has had vaginal bleeding for the past week.  She states that she does not have a monthly period due to having an IUD and she has not experienced bleeding with her IUD in the past. She denies fevers. She has tried controlling her pain with Tylenol, Ibuprofen, and heating pads but reports minimal relief. She reports having endometriosis in 2015 and feels that this pain is somewhat similar. Per chart review, patient has been seen several times in the last few months for varying forms of abdominal pain. During her most recent visit to the ED for evaluation of abdominal pain on 5/16/2017 (11 days ago) laboratory studies were unremarkable showing no acute metabolic abnormalities, normal CBC, and normal lipase. CT scan was completed which showed no acute findings consistent with endometriosis or acute pathology. She was found to have a 4 x 3 cm left ovarian cyst, but this was previously noted and similar in size to March 7th. Patient was discharged with recommendations to follow up with her obstetrician as well as her primary care doctor. Patient had an additional pelvic ultrasound on 5/24/2017 (3 days ago) which did not demonstrate any new findings in comparison to previous " imaging.     Complete pelvic ultrasound utilizing both abdominal and vaginal transducers.     Normal appearing uterus, IUD appropriately positioned within uterine cavity.   Left-sided complex ovarian cyst, 4.4 cm, largely anechoic, consistent with previously noted cyst on CT; appearance suggests likely benign etiology.       Recommend repeat scan in 6-8 weeks for interval change.     Normal right ovary noted    PAST MEDICAL HISTORY  Past Medical History:   Diagnosis Date     ADD (attention deficit disorder)      Anorexia nervosa with bulimia     history of; on Topamax     Anxiety      Borderline personality disorder      Depression      H/O self-harm      H/O swallowed foreign body     Recurrent issue     Lives in independent group home     due to debilitating mental illness     Migraine without aura     no known triggers; on Topamax bid and Imitrex PRN     Morbid obesity (H)      PTSD (post-traumatic stress disorder)      Rectal foreign body     Recurrent issue     PAST SURGICAL HISTORY  Past Surgical History:   Procedure Laterality Date     ESOPHAGOSCOPY, GASTROSCOPY, DUODENOSCOPY (EGD), COMBINED N/A 3/9/2017    Procedure: COMBINED ESOPHAGOSCOPY, GASTROSCOPY, DUODENOSCOPY (EGD), REMOVE FOREIGN BODY;  Surgeon: Avis Guzmán MD;  Location: [facility] OR     ESOPHAGOSCOPY, GASTROSCOPY, DUODENOSCOPY (EGD), COMBINED N/A 4/20/2017    Procedure: COMBINED ESOPHAGOSCOPY, GASTROSCOPY, DUODENOSCOPY (EGD), REMOVE FOREIGN BODY;  EGD removal Foregin body;  Surgeon: Lokesh Paula MD;  Location: [facility] OR     EXAM UNDER ANESTHESIA ANUS N/A 1/10/2017    Procedure: EXAM UNDER ANESTHESIA ANUS;  Surgeon: Annmarie Haynes MD;  Location: [facility] OR     HC REMOVE FECAL IMPACTION OR FB W ANESTHESIA N/A 12/18/2016    Procedure: REMOVE FECAL IMPACTION/FOREIGN BODY UNDER ANESTHESIA;  Surgeon: Soham Cano MD;  Location: [facility] OR     KNEE SURGERY Right     removed a small tissue mass from knee     LAPAROSCOPIC ABLATION  ENDOMETRIOSIS       LAPAROTOMY EXPLORATORY N/A 1/10/2017    Procedure: LAPAROTOMY EXPLORATORY;  Surgeon: Annmarie Haynes MD;  Location: [facility] OR     lymph nodes removed from neck; benign  age 7     MAMMOPLASTY REDUCTION Bilateral      RELEASE CARPAL TUNNEL Bilateral      SIGMOIDOSCOPY FLEXIBLE N/A 1/10/2017    Procedure: SIGMOIDOSCOPY FLEXIBLE;  Surgeon: Annmarie Haynes MD;  Location: [facility] OR     FAMILY HISTORY  Family History   Problem Relation Age of Onset     Type 2 Diabetes Maternal Grandmother      Type 2 Diabetes Paternal Grandmother      Breast Cancer Paternal Grandmother      CEREBROVASCULAR DISEASE Father 53     Myocardial Infarction No family hx of      Coronary Artery Disease Early Onset No family hx of      Ovarian Cancer No family hx of      Colon Cancer No family hx of      SOCIAL HISTORY  Social History   Substance Use Topics     Smoking status: Never Smoker     Smokeless tobacco: Never Used     Alcohol use No     MEDICATIONS  No current facility-administered medications for this encounter.      Current Outpatient Prescriptions   Medication     Acetaminophen 325 MG CAPS     levonorgestrel (MIRENA) 20 MCG/24HR IUD     LamoTRIgine (LAMICTAL PO)     ibuprofen (ADVIL/MOTRIN) 600 MG tablet     albuterol (ALBUTEROL) 108 (90 BASE) MCG/ACT Inhaler     senna (SENOKOT) 8.6 MG tablet     polyethylene glycol (MIRALAX/GLYCOLAX) powder     SUMATRIPTAN SUCCINATE PO     TOPIRAMATE PO     Desvenlafaxine Succinate (PRISTIQ PO)     Cholecalciferol (VITAMIN D3 PO)     augmented betamethasone dipropionate (DIPROLENE-AF) 0.05 % ointment     traMADol (ULTRAM) 50 MG tablet     ALLERGIES  Allergies   Allergen Reactions     Augmentin Swelling     Blood-Group Specific Substance Other (See Comments)     Patient has an anti-Cw and non-specific antibodies. Blood product orders may be delayed. Draw one red top and two purple top tubes for all type/screen/crossmatch orders.     Hydrocodone Nausea and Vomiting      vomiting for days     Influenza Vaccines Other (See Comments)     Oseltamivir Hives     med stopped, PN: med stopped     Vicodin [Hydrocodone-Acetaminophen] Nausea and Vomiting     Cefazolin Rash     Cephalosporins Rash         I have reviewed the Medications, Allergies, Past Medical and Surgical History, and Social History in the Epic system.    Review of Systems   Constitutional: Negative for diaphoresis and fever.   Respiratory: Negative for shortness of breath and wheezing.    Gastrointestinal: Positive for abdominal pain (lower) and nausea.   Genitourinary: Positive for vaginal bleeding. Negative for dysuria and vaginal discharge.   Musculoskeletal: Positive for back pain (lower, diffuse, bilateral).   Skin: Negative for color change.       Physical Exam   BP: 141/82  Pulse: 97  Heart Rate: 97  Temp: 97.6  F (36.4  C)  Resp: 16  Weight: 103.4 kg (228 lb)  SpO2: 96 %  Physical Exam   Constitutional: She appears well-developed and well-nourished. No distress.   Eyes: Right eye exhibits no discharge. Left eye exhibits no discharge.   Cardiovascular: Normal rate.    No murmur heard.  Pulmonary/Chest: Effort normal. No stridor. No respiratory distress. She has no wheezes.   Abdominal: Soft. Bowel sounds are normal. She exhibits no distension. There is generalized tenderness. There is no guarding.   Musculoskeletal: She exhibits no edema.        Back:    Neurological: She is alert. No cranial nerve deficit.   Skin: Skin is warm. She is not diaphoretic. No erythema.       ED Course     Procedures    Results for orders placed or performed during the hospital encounter of 05/27/17 (from the past 24 hour(s))   Lactic acid whole blood   Result Value Ref Range    Lactic Acid 0.8 0.7 - 2.1 mmol/L   Basic metabolic panel   Result Value Ref Range    Sodium 141 133 - 144 mmol/L    Potassium 3.9 3.4 - 5.3 mmol/L    Chloride 111 (H) 94 - 109 mmol/L    Carbon Dioxide 24 20 - 32 mmol/L    Anion Gap 6 3 - 14 mmol/L    Glucose 99 70  - 99 mg/dL    Urea Nitrogen 18 7 - 30 mg/dL    Creatinine 0.60 0.52 - 1.04 mg/dL    GFR Estimate >90  Non  GFR Calc   >60 mL/min/1.7m2    GFR Estimate If Black >90   GFR Calc   >60 mL/min/1.7m2    Calcium 8.7 8.5 - 10.1 mg/dL       Assessments & Plan (with Medical Decision Making)   Nevin Alvarado is a 26 year old female who is presenting to the ED with symptoms of chronic abdominal pain that has been unchanged. The patient has had multiple visit for the same. She had a CT scan last week without major findings. She had a pelvic ultrasound yesterday showed a cyst (repeat ultrasound needed), otherwise negative. Her pain is not consistent with torsion or TOA. She denies vaginal discharge. She has had negative laboratory evaluation including a UA. Lactate is wnl today. Electrolytes are also wnl.      I have reviewed the nursing notes.    I have reviewed the findings, diagnosis, plan and need for follow up with the patient.    Discharge Medication List as of 5/28/2017 12:54 AM          Final diagnoses:   Abdominal pain, generalized     IDaly, am serving as a trained medical scribe to document services personally performed by Matt Jane MD, based on the provider's statements to me.   Matt MURRAY MD, was physically present and have reviewed and verified the accuracy of this note documented by Daly Baker.      5/27/2017   [facility], EMERGENCY DEPARTMENT     Matt Jane MD  05/28/17 0103

## 2017-06-02 ENCOUNTER — TRANSFERRED RECORDS (OUTPATIENT)
Dept: HEALTH INFORMATION MANAGEMENT | Facility: CLINIC | Age: 27
End: 2017-06-02

## 2017-06-04 ENCOUNTER — TRANSFERRED RECORDS (OUTPATIENT)
Dept: HEALTH INFORMATION MANAGEMENT | Facility: CLINIC | Age: 27
End: 2017-06-04

## 2017-06-08 ENCOUNTER — HOSPITAL ENCOUNTER (INPATIENT)
Facility: CLINIC | Age: 27
LOS: 4 days | Discharge: HOME | DRG: 885 | End: 2017-06-13
Attending: FAMILY MEDICINE | Admitting: PSYCHIATRY & NEUROLOGY
Payer: MEDICARE

## 2017-06-08 ENCOUNTER — APPOINTMENT (OUTPATIENT)
Dept: GENERAL RADIOLOGY | Facility: CLINIC | Age: 27
DRG: 885 | End: 2017-06-08
Attending: FAMILY MEDICINE
Payer: MEDICARE

## 2017-06-08 ENCOUNTER — ANESTHESIA EVENT (OUTPATIENT)
Dept: SURGERY | Facility: CLINIC | Age: 27
DRG: 885 | End: 2017-06-08
Payer: MEDICARE

## 2017-06-08 DIAGNOSIS — T18.2XXA FOREIGN BODY IN STOMACH, INITIAL ENCOUNTER: ICD-10-CM

## 2017-06-08 DIAGNOSIS — F60.3 BORDERLINE PERSONALITY DISORDER (H): ICD-10-CM

## 2017-06-08 LAB
AMPHETAMINES UR QL SCN: ABNORMAL
APAP SERPL-MCNC: NORMAL MG/L (ref 10–20)
BARBITURATES UR QL: ABNORMAL
BASOPHILS # BLD AUTO: 0 10E9/L (ref 0–0.2)
BASOPHILS NFR BLD AUTO: 0.2 %
BENZODIAZ UR QL: ABNORMAL
CANNABINOIDS UR QL SCN: ABNORMAL
COCAINE UR QL: ABNORMAL
DIFFERENTIAL METHOD BLD: NORMAL
EOSINOPHIL # BLD AUTO: 0.2 10E9/L (ref 0–0.7)
EOSINOPHIL NFR BLD AUTO: 2.1 %
ERYTHROCYTE [DISTWIDTH] IN BLOOD BY AUTOMATED COUNT: 13.4 % (ref 10–15)
ETHANOL UR QL SCN: ABNORMAL
HCG UR QL: NEGATIVE
HCT VFR BLD AUTO: 38.2 % (ref 35–47)
HGB BLD-MCNC: 12.7 G/DL (ref 11.7–15.7)
IMM GRANULOCYTES # BLD: 0 10E9/L (ref 0–0.4)
IMM GRANULOCYTES NFR BLD: 0.2 %
LYMPHOCYTES # BLD AUTO: 1.6 10E9/L (ref 0.8–5.3)
LYMPHOCYTES NFR BLD AUTO: 16.2 %
MCH RBC QN AUTO: 29.5 PG (ref 26.5–33)
MCHC RBC AUTO-ENTMCNC: 33.2 G/DL (ref 31.5–36.5)
MCV RBC AUTO: 89 FL (ref 78–100)
MONOCYTES # BLD AUTO: 0.6 10E9/L (ref 0–1.3)
MONOCYTES NFR BLD AUTO: 6.3 %
NEUTROPHILS # BLD AUTO: 7.3 10E9/L (ref 1.6–8.3)
NEUTROPHILS NFR BLD AUTO: 75 %
NRBC # BLD AUTO: 0 10*3/UL
NRBC BLD AUTO-RTO: 0 /100
OPIATES UR QL SCN: ABNORMAL
PLATELET # BLD AUTO: 237 10E9/L (ref 150–450)
RBC # BLD AUTO: 4.3 10E12/L (ref 3.8–5.2)
SALICYLATES SERPL-MCNC: NORMAL MG/DL
WBC # BLD AUTO: 9.8 10E9/L (ref 4–11)

## 2017-06-08 PROCEDURE — 25000125 ZZHC RX 250: Performed by: FAMILY MEDICINE

## 2017-06-08 PROCEDURE — 80307 DRUG TEST PRSMV CHEM ANLYZR: CPT | Performed by: FAMILY MEDICINE

## 2017-06-08 PROCEDURE — 85025 COMPLETE CBC W/AUTO DIFF WBC: CPT | Performed by: FAMILY MEDICINE

## 2017-06-08 PROCEDURE — 99285 EMERGENCY DEPT VISIT HI MDM: CPT | Mod: Z6 | Performed by: FAMILY MEDICINE

## 2017-06-08 PROCEDURE — 80329 ANALGESICS NON-OPIOID 1 OR 2: CPT | Performed by: FAMILY MEDICINE

## 2017-06-08 PROCEDURE — 0DC68ZZ EXTIRPATION OF MATTER FROM STOMACH, VIA NATURAL OR ARTIFICIAL OPENING ENDOSCOPIC: ICD-10-PCS | Performed by: INTERNAL MEDICINE

## 2017-06-08 PROCEDURE — 80320 DRUG SCREEN QUANTALCOHOLS: CPT | Performed by: FAMILY MEDICINE

## 2017-06-08 PROCEDURE — 74020 XR ABDOMEN 2 VW: CPT

## 2017-06-08 PROCEDURE — 90791 PSYCH DIAGNOSTIC EVALUATION: CPT

## 2017-06-08 PROCEDURE — 80053 COMPREHEN METABOLIC PANEL: CPT | Performed by: FAMILY MEDICINE

## 2017-06-08 PROCEDURE — 81025 URINE PREGNANCY TEST: CPT | Performed by: FAMILY MEDICINE

## 2017-06-08 PROCEDURE — 99285 EMERGENCY DEPT VISIT HI MDM: CPT | Mod: 25 | Performed by: FAMILY MEDICINE

## 2017-06-08 RX ORDER — HYDROXYZINE HYDROCHLORIDE 50 MG/ML
25 INJECTION, SOLUTION INTRAMUSCULAR ONCE
Status: COMPLETED | OUTPATIENT
Start: 2017-06-08 | End: 2017-06-08

## 2017-06-08 RX ADMIN — HYDROXYZINE HYDROCHLORIDE 25 MG: 50 INJECTION, SOLUTION INTRAMUSCULAR at 23:09

## 2017-06-08 ASSESSMENT — ENCOUNTER SYMPTOMS
NERVOUS/ANXIOUS: 1
SHORTNESS OF BREATH: 0
AGITATION: 1
FEVER: 0
DYSPHORIC MOOD: 1
ABDOMINAL PAIN: 0

## 2017-06-08 NOTE — IP AVS SNAPSHOT
MRN:[id number]                      After Visit Summary   6/8/2017    Nevin Alvarado    MRN: [id number]           Thank you!     Thank you for choosing [facility] for your care. Our goal is always to provide you with excellent care.        Patient Information     Date Of Birth          1990        About your hospital stay     You were admitted on:  June 9, 2017 You last received care in the:  [facility] 30NR    You were discharged on:  June 13, 2017       Who to Call     For medical emergencies, please call 911.  For non-urgent questions about your medical care, please call your primary care provider or clinic, [phone]  For questions related to your surgery, please call your surgery clinic        Attending Provider     Provider Specialty    Sandoval Demarco MD Emergency Medicine    Lupe, Florin Arellano MD Emergency Medicine    Stephen Arias MD Psychiatry    Fauzia, Kodi Hatch MD Psychiatry       Primary Care Provider Office Phone # Fax #    Sandra Ramos [phone]      Your next 10 appointments already scheduled     Kaden 15, 2017  3:15 PM CDT   Office Visit with Sandra Ramos MD   [facility]    [address] [address] [phone]           Bring a current list of meds and any records pertaining to this visit.  For Physicals, please bring immunization records and any forms needing to be filled out.  Please arrive 10 minutes early to complete paperwork.              Further instructions from your care team       Behavioral Discharge Planning and Instructions    Summary:  You were admitted on 6/8/2017  because you swallowed paper clips when you became angry with your boss.  On 6/11, you swallowed a pen spring and another paperclip on the unit. You were placed on 1:1 monitoring. You were treated by Dr. Kodi Cage MD and Dr. Stephen Arias MD. You  asked for discharge as you and the MD felt like staying here was making you worse. You were discharged on 6/13/17 from [facility] back to your supportive living apartment program.    Main Diagnosis:   1.  Major depressive disorder, recurrent, moderate   2.  Borderline personality disorder.   3.  Generalized anxiety disorder.       Health Care Follow-up Appointments:     You have Medicare and signed the required paperwork at the time of discharge.  You have medical assistance and also have access to health care transportation through [facility].      You are on a civil commitment with a provisional discharge. Your provisional discharge was not revoked with this admission.      You are returning to the [facility]ment Program which is supportive living.  Contact is Kayla  Ph:[phone]  The Health Unit Coordinator has faxed these discharge instructions to fax: [phone]      You will need serial x-rays every 48 hours until the foreign objects passes. After a week if these do not pass then, you may need a CT scan.  Kaden 15, 2017  3:15 PM CDT   [facility]    [address] [address] [phone]            Attend your psychiatry appointment on Wednesday, June 28, 2017 at 9AM  [facility]   [facility] [address] [address] [phone]  The Health Unit Coordinator has faxed these discharge instructions to fax:[phone]        Attend your therapy appointment on Monday, June 19, 2017 at 12:30PM  [facility] [employer] [address] [address] [phone]  The Health Unit Coordinator has faxed these discharge instructions to fax:[phone]      Continue to coordinate your services with your mental health .  Yajaira Quezada  Ph:  [phone]  The Health Unit Coordinator has faxed these discharge instructions to fax: [phone]      You are going to reschedule with your ARM worker  [facility]      You will work with your :  [facility]      Coordinate your services with your CADI .  [place]    Attend all scheduled appointments with your outpatient providers. Call at least 24 hours in advance if you need to reschedule an appointment to ensure continued access to your outpatient providers.   Major Treatments, Procedures and Findings:  You were provided with: a psychiatric assessment, assessed for medical stability and group therapy    You had a pregnancy test.  This was negative.      You had a drug test.  This was positive for benzodiazepines.      You had an endoscopies and x-rays to look for foreign objects that you ingested.      Symptoms to Report: thoughts of harming yourself    Early warning signs can include: increased thoughts or behaviors of suicide or self-harm     Safety and Wellness:  Take all medicines as directed.  Make no changes unless your doctor suggests them.      Follow treatment recommendations.  Refrain from alcohol and non-prescribed drugs.  If there is a concern for safety, call 911.    Resources:     [facility] Crisis Response  [phone]  [facility] crisis intervention program (24-hour hotline).   The main goal of the Crisis Response Unit is to stabilize an immediate crisis and can  provide such services as: telephone counseling, emergency food or shelter, and suicide  prevention. Serves all [facility] Residents 24 Hours a Day, 7 Days a Week  Ask them about going to the [facility] Residence. This is a crisis residence where you can stay for a short time if you feel like you need to stabilize but do not need to go to the hospital.  [address] [place] [address]    [facility] ([facility] [place]  "[address] - [facility] [place] - [phone],   Monday - Friday 8:00 a.m. to 9:00 p.m.   Saturday - Sunday 11:00 a.m. to 3:00 p.m.       [facility] 24/7 Community Call Center: [phone]  Phone: [phone] outside the [facility] area: [phone]  www.crisis.org   Hours: 24 hours/day, 7 days/week  Services: Free and confidential telephone counseling provided by trained volunteers  supervised by professional staff. Early intervention and brief supportive counseling for  callers with issues of depression, stress and anxiety, domestic violence, parent/child  conflicts, family issues, loneliness, grief and loss, suicidal ideation and chronic mental  Illness.      [facility]  You and your family would benefit from the support groups at [facility].   Www.namihps.org          The treatment team has appreciated the opportunity to work with you.     If you have any questions or concerns our unit number is [phone].            Pending Results     Date and Time Order Name Status Description    6/13/2017 0738 XR Abdomen 1 View Preliminary             Admission Information     Date & Time Provider Department Dept. Phone    6/8/2017 Kodi Cage MD  30NR [phone]      Your Vitals Were     Blood Pressure Pulse Temperature Respirations Height Weight    129/79 (BP Location: Left arm) 73 98.6  F (37  C) (Oral) 12 1.575 m (5' 2\") 102.5 kg (226 lb)    Pulse Oximetry BMI (Body Mass Index)                95% 41.34 kg/m2          MyChart Information     [employer] gives you secure access to your electronic health record. If you see a primary care provider, you can also send messages to your care team and make appointments. If you have questions, please call your primary care clinic.  If you do not have a primary care provider, please call [phone] and they will assist you.        Care EveryWhere ID     This " is your Care EveryWhere ID. This could be used by other organizations to access your [facility] medical records  [id number]           Review of your medicines      CONTINUE these medicines which may have CHANGED, or have new prescriptions. If we are uncertain of the size of tablets/capsules you have at home, strength may be listed as something that might have changed.        Dose / Directions    LAMICTAL 100 MG tablet   This may have changed:    - medication strength  - how much to take   Generic drug:  lamoTRIgine        Dose:  100 mg   Take 1 tablet (100 mg) by mouth daily   Refills:  0         CONTINUE these medicines which have NOT CHANGED        Dose / Directions    acetaminophen 500 MG tablet   Commonly known as:  TYLENOL   Used for:  Moderate pain        Dose:  500-1000 mg   Take 1-2 tablets (500-1,000 mg) by mouth every 8 hours as needed for mild pain   Quantity:  90 tablet   Refills:  3       albuterol 108 (90 BASE) MCG/ACT Inhaler   Commonly known as:  albuterol        Dose:  2 puff   Inhale 2 puffs into the lungs every 4 hours as needed for shortness of breath / dyspnea   Quantity:  1 Inhaler   Refills:  0       augmented betamethasone dipropionate 0.05 % ointment   Commonly known as:  DIPROLENE-AF        Apply to AA BID x 2-3 weeks then PRN only   Quantity:  45 g   Refills:  3       ibuprofen 600 MG tablet   Commonly known as:  ADVIL/MOTRIN        Dose:  600 mg   Take 1 tablet (600 mg) by mouth every 6 hours as needed for moderate pain   Quantity:  30 tablet   Refills:  0       levonorgestrel 20 MCG/24HR IUD   Commonly known as:  MIRENA        Dose:  1 each   1 each (20 mcg) by Intrauterine route once for 1 dose   Refills:  0       polyethylene glycol powder   Commonly known as:  MIRALAX/GLYCOLAX   Used for:  Rectal foreign body, subsequent encounter        Dose:  17 g   Take 17 g by mouth daily as needed for constipation   Quantity:  510 g   Refills:  3       PRISTIQ PO        Dose:  100 mg   Take 100  mg by mouth daily   Refills:  0       senna 8.6 MG tablet   Commonly known as:  SENOKOT   Used for:  Acute post-operative pain        Dose:  1 tablet   Take 1 tablet by mouth 2 times daily as needed for constipation   Quantity:  60 tablet   Refills:  0       SUMATRIPTAN SUCCINATE PO        Dose:  50 mg   Take 50 mg by mouth once as needed for migraine Reported on 5/19/2017   Refills:  0       TOPIRAMATE PO        Take 50 mg by mouth in the morning and 100 mg by mouth in the evening   Refills:  0       traMADol 50 MG tablet   Commonly known as:  ULTRAM        Dose:  5 mg   Take 5 mg by mouth as needed   Refills:  0       VITAMIN D3 PO        Dose:  5000 Units   Take 5,000 Units by mouth daily   Refills:  0                Protect others around you: Learn how to safely use, store and throw away your medicines at www.disposemymeds.org.             Medication List: This is a list of all your medications and when to take them. Check marks below indicate your daily home schedule. Keep this list as a reference.      Medications           Morning Afternoon Evening Bedtime As Needed    acetaminophen 500 MG tablet   Commonly known as:  TYLENOL   Take 1-2 tablets (500-1,000 mg) by mouth every 8 hours as needed for mild pain   Last time this was given:  1,000 mg on 6/12/2017  8:35 PM                                albuterol 108 (90 BASE) MCG/ACT Inhaler   Commonly known as:  albuterol   Inhale 2 puffs into the lungs every 4 hours as needed for shortness of breath / dyspnea                                   augmented betamethasone dipropionate 0.05 % ointment   Commonly known as:  DIPROLENE-AF   Apply to AA BID x 2-3 weeks then PRN only                                   ibuprofen 600 MG tablet   Commonly known as:  ADVIL/MOTRIN   Take 1 tablet (600 mg) by mouth every 6 hours as needed for moderate pain   Last time this was given:  600 mg on 6/11/2017  7:42 PM                                   LAMICTAL 100 MG tablet   Take 1  tablet (100 mg) by mouth daily   Last time this was given:  100 mg on 6/12/2017  8:35 PM   Generic drug:  lamoTRIgine                                   levonorgestrel 20 MCG/24HR IUD   Commonly known as:  MIRENA   1 each (20 mcg) by Intrauterine route once for 1 dose                                   polyethylene glycol powder   Commonly known as:  MIRALAX/GLYCOLAX   Take 17 g by mouth daily as needed for constipation                                   PRISTIQ PO   Take 100 mg by mouth daily   Last time this was given:  100 mg on 6/13/2017  9:26 AM                                   senna 8.6 MG tablet   Commonly known as:  SENOKOT   Take 1 tablet by mouth 2 times daily as needed for constipation                                   SUMATRIPTAN SUCCINATE PO   Take 50 mg by mouth once as needed for migraine Reported on 5/19/2017                                   TOPIRAMATE PO   Take 50 mg by mouth in the morning and 100 mg by mouth in the evening   Last time this was given:  50 mg on 6/13/2017  9:26 AM                                   traMADol 50 MG tablet   Commonly known as:  ULTRAM   Take 5 mg by mouth as needed                                   VITAMIN D3 PO   Take 5,000 Units by mouth daily   Last time this was given:  5,000 Units on 6/13/2017  9:26 AM

## 2017-06-08 NOTE — IP AVS SNAPSHOT
30    [address]    [place] [address]    Phone:  [phone]                                       After Visit Summary   6/8/2017    Nevin Alvarado    MRN: [id number]           After Visit Summary Signature Page     I have received my discharge instructions, and my questions have been answered. I have discussed any challenges I see with this plan with the nurse or doctor.    ..........................................................................................................................................  Patient/Patient Representative Signature      ..........................................................................................................................................  Patient Representative Print Name and Relationship to Patient    ..................................................               ................................................  Date                                            Time    ..........................................................................................................................................  Reviewed by Signature/Title    ...................................................              ..............................................  Date                                                            Time

## 2017-06-09 ENCOUNTER — ANESTHESIA (OUTPATIENT)
Dept: SURGERY | Facility: CLINIC | Age: 27
DRG: 885 | End: 2017-06-09
Payer: MEDICARE

## 2017-06-09 PROBLEM — R45.851 SUICIDAL IDEATION: Status: ACTIVE | Noted: 2017-06-09

## 2017-06-09 LAB
ALBUMIN SERPL-MCNC: 3.8 G/DL (ref 3.4–5)
ALP SERPL-CCNC: 91 U/L (ref 40–150)
ALT SERPL W P-5'-P-CCNC: 21 U/L (ref 0–50)
ANION GAP SERPL CALCULATED.3IONS-SCNC: 5 MMOL/L (ref 3–14)
AST SERPL W P-5'-P-CCNC: 9 U/L (ref 0–45)
BILIRUB SERPL-MCNC: 0.1 MG/DL (ref 0.2–1.3)
BUN SERPL-MCNC: 14 MG/DL (ref 7–30)
CALCIUM SERPL-MCNC: 9.2 MG/DL (ref 8.5–10.1)
CHLORIDE SERPL-SCNC: 113 MMOL/L (ref 94–109)
CO2 SERPL-SCNC: 23 MMOL/L (ref 20–32)
CREAT SERPL-MCNC: 0.67 MG/DL (ref 0.52–1.04)
GFR SERPL CREATININE-BSD FRML MDRD: ABNORMAL ML/MIN/1.7M2
GLUCOSE SERPL-MCNC: 106 MG/DL (ref 70–99)
POTASSIUM SERPL-SCNC: 3.7 MMOL/L (ref 3.4–5.3)
PROT SERPL-MCNC: 7.7 G/DL (ref 6.8–8.8)
SODIUM SERPL-SCNC: 141 MMOL/L (ref 133–144)
UPPER GI ENDOSCOPY: NORMAL

## 2017-06-09 PROCEDURE — 36000051 ZZH SURGERY LEVEL 2 1ST 30 MIN - UMMC: Performed by: INTERNAL MEDICINE

## 2017-06-09 PROCEDURE — 25000125 ZZHC RX 250: Performed by: STUDENT IN AN ORGANIZED HEALTH CARE EDUCATION/TRAINING PROGRAM

## 2017-06-09 PROCEDURE — A9270 NON-COVERED ITEM OR SERVICE: HCPCS | Performed by: INTERNAL MEDICINE

## 2017-06-09 PROCEDURE — 25000128 H RX IP 250 OP 636: Performed by: STUDENT IN AN ORGANIZED HEALTH CARE EDUCATION/TRAINING PROGRAM

## 2017-06-09 PROCEDURE — 37000008 ZZH ANESTHESIA TECHNICAL FEE, 1ST 30 MIN: Performed by: INTERNAL MEDICINE

## 2017-06-09 PROCEDURE — 96376 TX/PRO/DX INJ SAME DRUG ADON: CPT | Performed by: FAMILY MEDICINE

## 2017-06-09 PROCEDURE — A9270 NON-COVERED ITEM OR SERVICE: HCPCS | Mod: GY | Performed by: PSYCHIATRY & NEUROLOGY

## 2017-06-09 PROCEDURE — 25000566 ZZH SEVOFLURANE, EA 15 MIN: Performed by: INTERNAL MEDICINE

## 2017-06-09 PROCEDURE — 25000125 ZZHC RX 250: Performed by: INTERNAL MEDICINE

## 2017-06-09 PROCEDURE — 96375 TX/PRO/DX INJ NEW DRUG ADDON: CPT | Performed by: FAMILY MEDICINE

## 2017-06-09 PROCEDURE — 12400003 ZZH R&B MH CRITICAL UMMC

## 2017-06-09 PROCEDURE — 25000132 ZZH RX MED GY IP 250 OP 250 PS 637: Mod: GY | Performed by: PSYCHIATRY & NEUROLOGY

## 2017-06-09 PROCEDURE — 71000014 ZZH RECOVERY PHASE 1 LEVEL 2 FIRST HR: Performed by: INTERNAL MEDICINE

## 2017-06-09 PROCEDURE — A9270 NON-COVERED ITEM OR SERVICE: HCPCS | Mod: GY | Performed by: EMERGENCY MEDICINE

## 2017-06-09 PROCEDURE — 25000132 ZZH RX MED GY IP 250 OP 250 PS 637: Mod: GY | Performed by: EMERGENCY MEDICINE

## 2017-06-09 PROCEDURE — 71000015 ZZH RECOVERY PHASE 1 LEVEL 2 EA ADDTL HR: Performed by: INTERNAL MEDICINE

## 2017-06-09 PROCEDURE — 99223 1ST HOSP IP/OBS HIGH 75: CPT | Mod: AI | Performed by: PSYCHIATRY & NEUROLOGY

## 2017-06-09 PROCEDURE — 96374 THER/PROPH/DIAG INJ IV PUSH: CPT | Performed by: FAMILY MEDICINE

## 2017-06-09 PROCEDURE — 25000125 ZZHC RX 250: Performed by: PSYCHIATRY & NEUROLOGY

## 2017-06-09 PROCEDURE — 37000009 ZZH ANESTHESIA TECHNICAL FEE, EACH ADDTL 15 MIN: Performed by: INTERNAL MEDICINE

## 2017-06-09 PROCEDURE — 36000053 ZZH SURGERY LEVEL 2 EA 15 ADDTL MIN - UMMC: Performed by: INTERNAL MEDICINE

## 2017-06-09 PROCEDURE — 27210794 ZZH OR GENERAL SUPPLY STERILE: Performed by: INTERNAL MEDICINE

## 2017-06-09 RX ORDER — LIDOCAINE HYDROCHLORIDE 10 MG/ML
INJECTION, SOLUTION EPIDURAL; INFILTRATION; INTRACAUDAL; PERINEURAL
Status: DISCONTINUED
Start: 2017-06-09 | End: 2017-06-09 | Stop reason: HOSPADM

## 2017-06-09 RX ORDER — SENNOSIDES 8.6 MG
1 TABLET ORAL 2 TIMES DAILY PRN
Status: DISCONTINUED | OUTPATIENT
Start: 2017-06-09 | End: 2017-06-13 | Stop reason: HOSPADM

## 2017-06-09 RX ORDER — HYDRALAZINE HYDROCHLORIDE 20 MG/ML
2.5-5 INJECTION INTRAMUSCULAR; INTRAVENOUS EVERY 10 MIN PRN
Status: DISCONTINUED | OUTPATIENT
Start: 2017-06-09 | End: 2017-06-09 | Stop reason: HOSPADM

## 2017-06-09 RX ORDER — LANOLIN ALCOHOL/MO/W.PET/CERES
3-6 CREAM (GRAM) TOPICAL
Status: DISCONTINUED | OUTPATIENT
Start: 2017-06-09 | End: 2017-06-13 | Stop reason: HOSPADM

## 2017-06-09 RX ORDER — ONDANSETRON 4 MG/1
4 TABLET, ORALLY DISINTEGRATING ORAL EVERY 30 MIN PRN
Status: DISCONTINUED | OUTPATIENT
Start: 2017-06-09 | End: 2017-06-09 | Stop reason: HOSPADM

## 2017-06-09 RX ORDER — ONDANSETRON 2 MG/ML
INJECTION INTRAMUSCULAR; INTRAVENOUS PRN
Status: DISCONTINUED | OUTPATIENT
Start: 2017-06-09 | End: 2017-06-09

## 2017-06-09 RX ORDER — TOPIRAMATE 50 MG/1
50 TABLET, FILM COATED ORAL 2 TIMES DAILY
Status: DISCONTINUED | OUTPATIENT
Start: 2017-06-09 | End: 2017-06-13 | Stop reason: HOSPADM

## 2017-06-09 RX ORDER — ACETAMINOPHEN 325 MG/1
650 TABLET ORAL EVERY 4 HOURS PRN
Status: DISCONTINUED | OUTPATIENT
Start: 2017-06-09 | End: 2017-06-13 | Stop reason: HOSPADM

## 2017-06-09 RX ORDER — POLYETHYLENE GLYCOL 3350 17 G/17G
17 POWDER, FOR SOLUTION ORAL DAILY PRN
Status: DISCONTINUED | OUTPATIENT
Start: 2017-06-09 | End: 2017-06-13 | Stop reason: HOSPADM

## 2017-06-09 RX ORDER — ONDANSETRON 2 MG/ML
4 INJECTION INTRAMUSCULAR; INTRAVENOUS EVERY 30 MIN PRN
Status: DISCONTINUED | OUTPATIENT
Start: 2017-06-09 | End: 2017-06-09 | Stop reason: HOSPADM

## 2017-06-09 RX ORDER — SODIUM CHLORIDE, SODIUM LACTATE, POTASSIUM CHLORIDE, CALCIUM CHLORIDE 600; 310; 30; 20 MG/100ML; MG/100ML; MG/100ML; MG/100ML
INJECTION, SOLUTION INTRAVENOUS CONTINUOUS PRN
Status: DISCONTINUED | OUTPATIENT
Start: 2017-06-09 | End: 2017-06-09

## 2017-06-09 RX ORDER — LIDOCAINE HYDROCHLORIDE 20 MG/ML
INJECTION, SOLUTION INFILTRATION; PERINEURAL PRN
Status: DISCONTINUED | OUTPATIENT
Start: 2017-06-09 | End: 2017-06-09

## 2017-06-09 RX ORDER — PROPOFOL 10 MG/ML
INJECTION, EMULSION INTRAVENOUS PRN
Status: DISCONTINUED | OUTPATIENT
Start: 2017-06-09 | End: 2017-06-09

## 2017-06-09 RX ORDER — SUMATRIPTAN 50 MG/1
50 TABLET, FILM COATED ORAL
Status: DISCONTINUED | OUTPATIENT
Start: 2017-06-09 | End: 2017-06-13 | Stop reason: HOSPADM

## 2017-06-09 RX ORDER — ACETAMINOPHEN 500 MG
500-1000 TABLET ORAL EVERY 8 HOURS PRN
Status: DISCONTINUED | OUTPATIENT
Start: 2017-06-09 | End: 2017-06-13 | Stop reason: HOSPADM

## 2017-06-09 RX ORDER — FENTANYL CITRATE 50 UG/ML
INJECTION, SOLUTION INTRAMUSCULAR; INTRAVENOUS PRN
Status: DISCONTINUED | OUTPATIENT
Start: 2017-06-09 | End: 2017-06-09

## 2017-06-09 RX ORDER — IBUPROFEN 600 MG/1
600 TABLET, FILM COATED ORAL EVERY 6 HOURS PRN
Status: DISCONTINUED | OUTPATIENT
Start: 2017-06-09 | End: 2017-06-13 | Stop reason: HOSPADM

## 2017-06-09 RX ORDER — ONDANSETRON 4 MG/1
4-8 TABLET, FILM COATED ORAL 2 TIMES DAILY PRN
Status: DISCONTINUED | OUTPATIENT
Start: 2017-06-09 | End: 2017-06-09

## 2017-06-09 RX ORDER — DEXAMETHASONE SODIUM PHOSPHATE 4 MG/ML
INJECTION, SOLUTION INTRA-ARTICULAR; INTRALESIONAL; INTRAMUSCULAR; INTRAVENOUS; SOFT TISSUE PRN
Status: DISCONTINUED | OUTPATIENT
Start: 2017-06-09 | End: 2017-06-09

## 2017-06-09 RX ORDER — ONDANSETRON 4 MG/1
4-8 TABLET, ORALLY DISINTEGRATING ORAL EVERY 6 HOURS PRN
Status: DISCONTINUED | OUTPATIENT
Start: 2017-06-09 | End: 2017-06-13 | Stop reason: HOSPADM

## 2017-06-09 RX ORDER — FENTANYL CITRATE 50 UG/ML
25-50 INJECTION, SOLUTION INTRAMUSCULAR; INTRAVENOUS
Status: DISCONTINUED | OUTPATIENT
Start: 2017-06-09 | End: 2017-06-09 | Stop reason: HOSPADM

## 2017-06-09 RX ORDER — SODIUM CHLORIDE, SODIUM LACTATE, POTASSIUM CHLORIDE, CALCIUM CHLORIDE 600; 310; 30; 20 MG/100ML; MG/100ML; MG/100ML; MG/100ML
INJECTION, SOLUTION INTRAVENOUS CONTINUOUS
Status: DISCONTINUED | OUTPATIENT
Start: 2017-06-09 | End: 2017-06-09 | Stop reason: HOSPADM

## 2017-06-09 RX ORDER — HYDROMORPHONE HYDROCHLORIDE 1 MG/ML
.3-.5 INJECTION, SOLUTION INTRAMUSCULAR; INTRAVENOUS; SUBCUTANEOUS EVERY 5 MIN PRN
Status: DISCONTINUED | OUTPATIENT
Start: 2017-06-09 | End: 2017-06-09 | Stop reason: HOSPADM

## 2017-06-09 RX ORDER — HYDROXYZINE HYDROCHLORIDE 25 MG/1
25-50 TABLET, FILM COATED ORAL EVERY 4 HOURS PRN
Status: DISCONTINUED | OUTPATIENT
Start: 2017-06-09 | End: 2017-06-13 | Stop reason: HOSPADM

## 2017-06-09 RX ORDER — ALBUTEROL SULFATE 90 UG/1
2 AEROSOL, METERED RESPIRATORY (INHALATION) EVERY 4 HOURS PRN
Status: DISCONTINUED | OUTPATIENT
Start: 2017-06-09 | End: 2017-06-13 | Stop reason: HOSPADM

## 2017-06-09 RX ORDER — DESVENLAFAXINE 50 MG/1
100 TABLET, FILM COATED, EXTENDED RELEASE ORAL DAILY
Status: DISCONTINUED | OUTPATIENT
Start: 2017-06-09 | End: 2017-06-13 | Stop reason: HOSPADM

## 2017-06-09 RX ORDER — LABETALOL HYDROCHLORIDE 5 MG/ML
10 INJECTION, SOLUTION INTRAVENOUS
Status: DISCONTINUED | OUTPATIENT
Start: 2017-06-09 | End: 2017-06-09 | Stop reason: HOSPADM

## 2017-06-09 RX ORDER — LAMOTRIGINE 100 MG/1
100 TABLET ORAL DAILY
Status: DISCONTINUED | OUTPATIENT
Start: 2017-06-09 | End: 2017-06-13 | Stop reason: HOSPADM

## 2017-06-09 RX ADMIN — FENTANYL CITRATE 150 MCG: 50 INJECTION, SOLUTION INTRAMUSCULAR; INTRAVENOUS at 00:40

## 2017-06-09 RX ADMIN — FENTANYL CITRATE 50 MCG: 50 INJECTION INTRAMUSCULAR; INTRAVENOUS at 02:33

## 2017-06-09 RX ADMIN — ONDANSETRON 4 MG: 2 INJECTION INTRAMUSCULAR; INTRAVENOUS at 02:18

## 2017-06-09 RX ADMIN — HYDROMORPHONE HYDROCHLORIDE 0.4 MG: 10 INJECTION, SOLUTION INTRAMUSCULAR; INTRAVENOUS; SUBCUTANEOUS at 02:45

## 2017-06-09 RX ADMIN — LIDOCAINE HYDROCHLORIDE 100 MG: 20 INJECTION, SOLUTION INFILTRATION; PERINEURAL at 00:40

## 2017-06-09 RX ADMIN — HYDROXYZINE HYDROCHLORIDE 50 MG: 25 TABLET ORAL at 11:34

## 2017-06-09 RX ADMIN — ONDANSETRON 4 MG: 4 TABLET, ORALLY DISINTEGRATING ORAL at 20:02

## 2017-06-09 RX ADMIN — FENTANYL CITRATE 25 MCG: 50 INJECTION INTRAMUSCULAR; INTRAVENOUS at 01:57

## 2017-06-09 RX ADMIN — PHENYLEPHRINE HYDROCHLORIDE 100 MCG: 10 INJECTION, SOLUTION INTRAMUSCULAR; INTRAVENOUS; SUBCUTANEOUS at 00:59

## 2017-06-09 RX ADMIN — FENTANYL CITRATE 50 MCG: 50 INJECTION INTRAMUSCULAR; INTRAVENOUS at 02:07

## 2017-06-09 RX ADMIN — DEXAMETHASONE SODIUM PHOSPHATE 8 MG: 4 INJECTION, SOLUTION INTRA-ARTICULAR; INTRALESIONAL; INTRAMUSCULAR; INTRAVENOUS; SOFT TISSUE at 00:46

## 2017-06-09 RX ADMIN — SODIUM CHLORIDE, POTASSIUM CHLORIDE, SODIUM LACTATE AND CALCIUM CHLORIDE: 600; 310; 30; 20 INJECTION, SOLUTION INTRAVENOUS at 00:30

## 2017-06-09 RX ADMIN — ONDANSETRON 4 MG: 2 INJECTION INTRAMUSCULAR; INTRAVENOUS at 01:11

## 2017-06-09 RX ADMIN — LAMOTRIGINE 100 MG: 100 TABLET ORAL at 19:46

## 2017-06-09 RX ADMIN — FENTANYL CITRATE 25 MCG: 50 INJECTION INTRAMUSCULAR; INTRAVENOUS at 02:00

## 2017-06-09 RX ADMIN — PROPOFOL 150 MG: 10 INJECTION, EMULSION INTRAVENOUS at 00:40

## 2017-06-09 RX ADMIN — TOPIRAMATE 50 MG: 50 TABLET, FILM COATED ORAL at 19:46

## 2017-06-09 RX ADMIN — SUCCINYLCHOLINE CHLORIDE 100 MG: 20 INJECTION, SOLUTION INTRAMUSCULAR; INTRAVENOUS at 00:40

## 2017-06-09 RX ADMIN — FENTANYL CITRATE 50 MCG: 50 INJECTION INTRAMUSCULAR; INTRAVENOUS at 02:18

## 2017-06-09 RX ADMIN — ONDANSETRON 4 MG: 2 INJECTION INTRAMUSCULAR; INTRAVENOUS at 00:44

## 2017-06-09 ASSESSMENT — ACTIVITIES OF DAILY LIVING (ADL)
BATHING: 0-->INDEPENDENT
LAUNDRY: WITH SUPERVISION
COGNITION: 0 - NO COGNITION ISSUES REPORTED
TOILETING: 0-->INDEPENDENT
RETIRED_EATING: 0-->INDEPENDENT
RETIRED_COMMUNICATION: 0-->UNDERSTANDS/COMMUNICATES WITHOUT DIFFICULTY
GROOMING: INDEPENDENT;PROMPTS
AMBULATION: 0-->INDEPENDENT
DRESS: INDEPENDENT
ORAL_HYGIENE: INDEPENDENT
GROOMING: INDEPENDENT
SWALLOWING: 0-->SWALLOWS FOODS/LIQUIDS WITHOUT DIFFICULTY
ORAL_HYGIENE: INDEPENDENT
DRESS: 0-->INDEPENDENT
TRANSFERRING: 0-->INDEPENDENT
DRESS: INDEPENDENT;SCRUBS (BEHAVIORAL HEALTH);STREET CLOTHES
FALL_HISTORY_WITHIN_LAST_SIX_MONTHS: NO

## 2017-06-09 NOTE — ED NOTES
Pt arrived via [employer]. [facility]. Talking in complete sentences. VSS. Pt c/o some abdominal pain at this time.

## 2017-06-09 NOTE — ANESTHESIA POSTPROCEDURE EVALUATION
Patient: Nevin Alvarado    Procedure(s):  Esophagoscopy, Gastroscopy, Duodenoscopy, Removal of Foreign Body - Wound Class: II-Clean Contaminated    Diagnosis:Foreign Body  Diagnosis Additional Information: No value filed.    Anesthesia Type:  General    Note:  Anesthesia Post Evaluation    Patient location during evaluation: PACU  Patient participation: Able to fully participate in evaluation  Level of consciousness: awake and alert  Pain management: satisfactory to patient  Airway patency: patent  Cardiovascular status: blood pressure returned to baseline and hemodynamically stable  Respiratory status: room air  Hydration status: euvolemic  PONV: controlled     Anesthetic complications: None          Last vitals:  Vitals:    06/08/17 2055 06/08/17 2331   BP: (!) 128/93 (!) 134/96   Pulse: 99 99   Resp: 16 16   Temp: 37.6  C (99.7  F)    SpO2: 97% 99%         Electronically Signed By: Reece Mendez MD  June 9, 2017  1:35 AM

## 2017-06-09 NOTE — CONSULTS
GASTROENTEROLOGY CONSULTATION      Date of Admission:  6/8/2017          ASSESSMENT AND RECOMMENDATIONS:   Assessment:  26 year old female with a history of borderline personality disorder, morbid obesity, multiple foreign object ingestions requiring endoscopic removal, prior endometriosis requiring surgical correction, and surgical removal of rectal foreign object who presented to [facility] ED on 6/8/17 with report of foreign body ingestion.  Patient reportedly ingested 3 paper clips (two unfolded) around 8 pm on zulema of presentation for stress relief.  Patient asymptomatic, hemodynamically stable but AXR shows three paper clips, two unfolded in mid abdomen.  EGD completed with removal of three paper clips as seen on AXR.  No significant trauma was noted in stomach where found.  Patient has had numerous high risk foreign body removals *4 in last 3 months) as a maladaptive coping mechanism.  This is potentially life-threatening and alternative coping mechanisms must be considered and taught.  Recommend psychiatric/psychological interventions to minimize patients risks.  Please see EGD note.     Recommendations  -EGD completed  -ADAT  -Psychiatric evaluation  -okay to admit to psychiatric care from GI perspective  -pursue alternative coping mechanisms.    Gastroenterology follow up recommendations: none    Thank you for involving us in this patient's care. Please do not hesitate to contact the GI service with any questions or concerns.     Pt care plan discussed with Dr. Hansen, GI staff physician.    Dejon Marsh  -------------------------------------------------------------------------------------------------------------------          Chief Complaint:   We were asked by Dr. Ravi of Emergency Medicine to evaluate this patient with foreign body ingestion.    History is obtained from the patient and the medical record.          History of Present Illness:   Nevin Alvarado is a 26 year old female with  a history of borderline personality disorder, morbid obesity, multiple foreign object ingestions requiring endoscopic removal, prior endometriosis requiring surgical correction, and surgical removal of rectal foreign object who presented to [facility] ED on 6/8/17 with report of foreign body ingestion.  Patient reportedly ingested 3 paper clips (two unfolded) around 8 pm on zulema of presentation for stress relief.  She reports increased stress over the last week.  She reports she underwent an EGD at OSH for orestes pin ingestion on 6/4.  Persistent stress prompted paper clip ingestion x3 (two straightened).  Patient reports normal appetite, no dysphagia, heartburn, abdominal pain, melena, hematochezia, diarrhea, and constipation.  Intermittent NSAIDS use, none in the last 24 hours, no alcohol use, no illicit drug use or other ingestions.      Negative ROS otherwise            Past Medical History:   Reviewed and edited as appropriate  Past Medical History:   Diagnosis Date     ADD (attention deficit disorder)      Anorexia nervosa with bulimia     history of; on Topamax     Anxiety      Borderline personality disorder      Depression      H/O self-harm      H/O swallowed foreign body     Recurrent issue     Lives in independent group home     due to debilitating mental illness     Migraine without aura     no known triggers; on Topamax bid and Imitrex PRN     Morbid obesity (H)      PTSD (post-traumatic stress disorder)      Rectal foreign body     Recurrent issue            Past Surgical History:   Reviewed and edited as appropriate   Past Surgical History:   Procedure Laterality Date     ESOPHAGOSCOPY, GASTROSCOPY, DUODENOSCOPY (EGD), COMBINED N/A 3/9/2017    Procedure: COMBINED ESOPHAGOSCOPY, GASTROSCOPY, DUODENOSCOPY (EGD), REMOVE FOREIGN BODY;  Surgeon: Avis Guzmán MD;  Location:  OR     ESOPHAGOSCOPY, GASTROSCOPY, DUODENOSCOPY (EGD), COMBINED N/A 4/20/2017    Procedure: COMBINED ESOPHAGOSCOPY,  GASTROSCOPY, DUODENOSCOPY (EGD), REMOVE FOREIGN BODY;  EGD removal Foregin body;  Surgeon: Lokesh Paula MD;  Location: [facility] OR     EXAM UNDER ANESTHESIA ANUS N/A 1/10/2017    Procedure: EXAM UNDER ANESTHESIA ANUS;  Surgeon: Annmarie Haynes MD;  Location: [facility] OR     HC REMOVE FECAL IMPACTION OR FB W ANESTHESIA N/A 12/18/2016    Procedure: REMOVE FECAL IMPACTION/FOREIGN BODY UNDER ANESTHESIA;  Surgeon: Soham Cano MD;  Location: [facility] OR     KNEE SURGERY Right     removed a small tissue mass from knee     LAPAROSCOPIC ABLATION ENDOMETRIOSIS       LAPAROTOMY EXPLORATORY N/A 1/10/2017    Procedure: LAPAROTOMY EXPLORATORY;  Surgeon: Annmarie Haynes MD;  Location: [facility] OR     lymph nodes removed from neck; benign  age 7     MAMMOPLASTY REDUCTION Bilateral      RELEASE CARPAL TUNNEL Bilateral      SIGMOIDOSCOPY FLEXIBLE N/A 1/10/2017    Procedure: SIGMOIDOSCOPY FLEXIBLE;  Surgeon: Annmarie Haynes MD;  Location: [facility] OR            Previous Endoscopy:   No results found for this or any previous visit.         Social History:   Reviewed and edited as appropriate  Social History     Social History     Marital status: Single     Spouse name: N/A     Number of children: N/A     Years of education: N/A     Occupational History     On disability      Social History Main Topics     Smoking status: Never Smoker     Smokeless tobacco: Never Used     Alcohol use No     Drug use: No     Sexual activity: No      Comment: IUD - Mirena - placed July, 2015     Other Topics Concern     Not on file     Social History Narrative    Single.    Living in independent living portion of [facility].    On disability.    No regular exercise.             Family History:   Reviewed and edited as appropriate  No known history of gastrointestinal/liver disease or  gastrointestinal malignancies       Allergies:   Reviewed and edited as appropriate     Allergies   Allergen Reactions     Augmentin Swelling      Blood-Group Specific Substance Other (See Comments)     Patient has an anti-Cw and non-specific antibodies. Blood product orders may be delayed. Draw one red top and two purple top tubes for all type/screen/crossmatch orders.     Hydrocodone Nausea and Vomiting     vomiting for days     Influenza Vaccines Other (See Comments)     Oseltamivir Hives     med stopped, PN: med stopped     Vicodin [Hydrocodone-Acetaminophen] Nausea and Vomiting     Cefazolin Rash     Cephalosporins Rash            Medications:     Prescriptions Prior to Admission   Medication Sig Dispense Refill Last Dose     acetaminophen (TYLENOL) 500 MG tablet Take 1-2 tablets (500-1,000 mg) by mouth every 8 hours as needed for mild pain 90 tablet 3      augmented betamethasone dipropionate (DIPROLENE-AF) 0.05 % ointment Apply to AA BID x 2-3 weeks then PRN only 45 g 3 Unknown at Unknown time     traMADol (ULTRAM) 50 MG tablet Take 5 mg by mouth as needed   Unknown at Unknown time     levonorgestrel (MIRENA) 20 MCG/24HR IUD 1 each (20 mcg) by Intrauterine route once for 1 dose        LamoTRIgine (LAMICTAL PO) Take 50 mg by mouth daily    Taking     ibuprofen (ADVIL/MOTRIN) 600 MG tablet Take 1 tablet (600 mg) by mouth every 6 hours as needed for moderate pain 30 tablet 0 Taking     albuterol (ALBUTEROL) 108 (90 BASE) MCG/ACT Inhaler Inhale 2 puffs into the lungs every 4 hours as needed for shortness of breath / dyspnea 1 Inhaler 0 Not Taking     senna (SENOKOT) 8.6 MG tablet Take 1 tablet by mouth 2 times daily as needed for constipation 60 tablet 0 Not Taking     polyethylene glycol (MIRALAX/GLYCOLAX) powder Take 17 g by mouth daily as needed for constipation 510 g 3 Not Taking     SUMATRIPTAN SUCCINATE PO Take 50 mg by mouth once as needed for migraine Reported on 5/19/2017   Not Taking     TOPIRAMATE PO Take 50 mg by mouth in the morning and 100 mg by mouth in the evening   Taking     Desvenlafaxine Succinate (PRISTIQ PO) Take 100 mg by mouth daily    Taking     Cholecalciferol (VITAMIN D3 PO) Take 5,000 Units by mouth daily    Taking             Review of Systems:   A complete review of systems was performed and is negative except as noted in the HPI           Physical Exam:   BP (!) 134/96  Pulse 99  Temp 99.7  F (37.6  C) (Oral)  Resp 16  SpO2 99%  Wt:   Wt Readings from Last 2 Encounters:   05/27/17 101.4 kg (223 lb 9.6 oz)   05/19/17 100.4 kg (221 lb 6.4 oz)      Constitutional: cooperative, pleasant, not dyspneic/diaphoretic, no acute distress  Eyes: Sclera anicteric/injected  Ears/nose/mouth/throat: Normal oropharynx without ulcers or exudate, mucus membranes moist, hearing intact  Neck: supple, thyroid normal size  CV: No edema  Respiratory: Unlabored breathing  Lymph: No axillary, submandibular, supraclavicular or inguinal lymphadenopathy  Abd: soft, Nondistended, +bs, no hepatosplenomegaly, nontender, no peritoneal signs  Skin: warm, perfused, no jaundice  Neuro: AAO x 3, No asterixis  Psych: Normal affect  MSK: No gross deformities         Data:   Labs and imaging below were independently reviewed and interpreted    BMP  Recent Labs  Lab 06/08/17  2145      POTASSIUM 3.7   CHLORIDE 113*   KELBY 9.2   CO2 23   BUN 14   CR 0.67   *     CBC  Recent Labs  Lab 06/08/17  2145   WBC 9.8   RBC 4.30   HGB 12.7   HCT 38.2   MCV 89   MCH 29.5   MCHC 33.2   RDW 13.4        INRNo lab results found in last 7 days.  LFTs  Recent Labs  Lab 06/08/17  2145   ALKPHOS Pending   AST 9   ALT 21   BILITOTAL 0.1*   PROTTOTAL 7.7   ALBUMIN 3.8      PANCNo lab results found in last 7 days.    Imaging:  CXR:  IMPRESSION: There is a metallic paper clip, along with two linear  metallic wires centered within the mid abdomen consistent with foreign  bodies. There is no free air visualized or bowel obstruction. IUD seen  in the pelvis incidentally. Scoliotic curvature of the spine.

## 2017-06-09 NOTE — ANESTHESIA CARE TRANSFER NOTE
Patient: Nevin Alvarado    Procedure(s):  Esophagoscopy, Gastroscopy, Duodenoscopy, Removal of Foreign Body - Wound Class: II-Clean Contaminated    Diagnosis: Foreign Body  Diagnosis Additional Information: No value filed.    Anesthesia Type:   General     Note:  Airway :Face Mask  Patient transferred to:PACU  Comments: Tolerated the procedure under general anesthesia without immediate complication.  Extubated in the OR without difficulty. Hemodynamically normal and in no acute distress; denies nausea or pain. Ventilating and oxygenating appropriately with low flow supplemental O2 per face mask. Plan to discharge to ED from PACU. Likely admission tonight by Psychiatry.      Call with questions or concerns,    Lewis Madera MD  Anesthesiology Resident   [phone]                 Vitals: (Last set prior to Anesthesia Care Transfer)    CRNA VITALS  6/9/2017 0102 - 6/9/2017 0142      6/9/2017             Pulse: 89    SpO2: 100 %    Resp Rate (observed): 9                Electronically Signed By: Lewis Billy MD, MD  June 9, 2017  1:42 AM

## 2017-06-09 NOTE — PROGRESS NOTES
RESULTS (see official report under Chart Review//Procedures tab for details)    PERFORMED BY:  Dr. Marsh and Dr. Hansen  INDICATION:  Foreign object  ASA SCORE:  3  SEDATION:  General anesthesia  COMPLICATIONS:  None    FINDINGS:  - 3 paper clips in stomach.  Overtube placed, Rat-tooth forceps used to remove paperclips.  Completed successfully without complications    RECOMMENDATIONS:  - ADAT  - no further GI follow up required  - see consult note

## 2017-06-09 NOTE — ANESTHESIA PREPROCEDURE EVALUATION
Anesthesiology   Pre-Operative Evaluation     Procedure: Procedure(s):  Removal of foreign body - Wound Class: II-Clean Contaminated    HPI: Nevin Alvarado is a 26 year old female scheduled for emergent EGD due to foreign body ingestion. Patient reports swallowing 3 paper clips. At least 8 prior similar episodes in the past each time requiring an endoscopy for removal. Last PO intake > 8 hours ago. Denies chest pain or difficulty breathing nausea or vomiting.     PMHx/PSHx:  Past Medical History:   Diagnosis Date     ADD (attention deficit disorder)      Anorexia nervosa with bulimia     history of; on Topamax     Anxiety      Borderline personality disorder      Depression      H/O self-harm      H/O swallowed foreign body     Recurrent issue     Lives in independent group home     due to debilitating mental illness     Migraine without aura     no known triggers; on Topamax bid and Imitrex PRN     Morbid obesity (H)      PTSD (post-traumatic stress disorder)      Rectal foreign body     Recurrent issue       Past Surgical History:   Procedure Laterality Date     ESOPHAGOSCOPY, GASTROSCOPY, DUODENOSCOPY (EGD), COMBINED N/A 3/9/2017    Procedure: COMBINED ESOPHAGOSCOPY, GASTROSCOPY, DUODENOSCOPY (EGD), REMOVE FOREIGN BODY;  Surgeon: Avis Guzmán MD;  Location: [facility] OR     ESOPHAGOSCOPY, GASTROSCOPY, DUODENOSCOPY (EGD), COMBINED N/A 4/20/2017    Procedure: COMBINED ESOPHAGOSCOPY, GASTROSCOPY, DUODENOSCOPY (EGD), REMOVE FOREIGN BODY;  EGD removal Foregin body;  Surgeon: Lokehs Paula MD;  Location: [facility] OR     EXAM UNDER ANESTHESIA ANUS N/A 1/10/2017    Procedure: EXAM UNDER ANESTHESIA ANUS;  Surgeon: Annmarie Haynes MD;  Location: [facility] OR     HC REMOVE FECAL IMPACTION OR FB W ANESTHESIA N/A 12/18/2016    Procedure: REMOVE FECAL IMPACTION/FOREIGN BODY UNDER ANESTHESIA;  Surgeon: Soham Cano MD;  Location: [facility] OR     KNEE SURGERY Right     removed a small tissue mass from  knee     LAPAROSCOPIC ABLATION ENDOMETRIOSIS       LAPAROTOMY EXPLORATORY N/A 1/10/2017    Procedure: LAPAROTOMY EXPLORATORY;  Surgeon: Annmarie Haynes MD;  Location: [facility] OR     lymph nodes removed from neck; benign  age 7     MAMMOPLASTY REDUCTION Bilateral      RELEASE CARPAL TUNNEL Bilateral      SIGMOIDOSCOPY FLEXIBLE N/A 1/10/2017    Procedure: SIGMOIDOSCOPY FLEXIBLE;  Surgeon: Annmarie Haynes MD;  Location: [facility] OR         No current facility-administered medications on file prior to encounter.   Current Outpatient Prescriptions on File Prior to Encounter:  acetaminophen (TYLENOL) 500 MG tablet Take 1-2 tablets (500-1,000 mg) by mouth every 8 hours as needed for mild pain   augmented betamethasone dipropionate (DIPROLENE-AF) 0.05 % ointment Apply to AA BID x 2-3 weeks then PRN only   traMADol (ULTRAM) 50 MG tablet Take 5 mg by mouth as needed   levonorgestrel (MIRENA) 20 MCG/24HR IUD 1 each (20 mcg) by Intrauterine route once for 1 dose   LamoTRIgine (LAMICTAL PO) Take 50 mg by mouth daily    ibuprofen (ADVIL/MOTRIN) 600 MG tablet Take 1 tablet (600 mg) by mouth every 6 hours as needed for moderate pain   albuterol (ALBUTEROL) 108 (90 BASE) MCG/ACT Inhaler Inhale 2 puffs into the lungs every 4 hours as needed for shortness of breath / dyspnea   senna (SENOKOT) 8.6 MG tablet Take 1 tablet by mouth 2 times daily as needed for constipation   polyethylene glycol (MIRALAX/GLYCOLAX) powder Take 17 g by mouth daily as needed for constipation   SUMATRIPTAN SUCCINATE PO Take 50 mg by mouth once as needed for migraine Reported on 5/19/2017   TOPIRAMATE PO Take 50 mg by mouth in the morning and 100 mg by mouth in the evening   Desvenlafaxine Succinate (PRISTIQ PO) Take 100 mg by mouth daily   Cholecalciferol (VITAMIN D3 PO) Take 5,000 Units by mouth daily        Social Hx:   Social History   Substance Use Topics     Smoking status: Never Smoker     Smokeless tobacco: Never Used     Alcohol use No        Allergies:   Allergies   Allergen Reactions     Augmentin Swelling     Blood-Group Specific Substance Other (See Comments)     Patient has an anti-Cw and non-specific antibodies. Blood product orders may be delayed. Draw one red top and two purple top tubes for all type/screen/crossmatch orders.     Hydrocodone Nausea and Vomiting     vomiting for days     Influenza Vaccines Other (See Comments)     Oseltamivir Hives     med stopped, PN: med stopped     Vicodin [Hydrocodone-Acetaminophen] Nausea and Vomiting     Cefazolin Rash     Cephalosporins Rash       NPO Status: > 6 hours     Labs:    Blood Bank:  Lab Results   Component Value Date    ABO O 03/09/2017    [facility]  Pos 03/09/2017    AS Pos 03/09/2017     BMP:  Recent Labs   Lab Test  06/08/17   2145   NA  141   POTASSIUM  3.7   CHLORIDE  113*   CO2  23   BUN  14   CR  0.67   GLC  106*   KELBY  9.2     CBC:   Recent Labs   Lab Test  06/08/17   2145   WBC  9.8   RBC  4.30   HGB  12.7   HCT  38.2   MCV  89   MCH  29.5   MCHC  33.2   RDW  13.4   PLT  237     Coags:  Recent Labs   Lab Test  03/09/17   1611  01/31/15   2120   INR  1.12  1.10   PTT   --   37   Physical Exam  Normal systems: cardiovascular, pulmonary and dental    Airway   Mallampati: I  TM distance: >3 FB  Neck ROM: full    Dental     Cardiovascular   Rhythm and rate: regular and normal      Pulmonary    breath sounds clear to auscultation            Anesthesia Plan      History & Physical Review  History and physical reviewed and following examination; no interval change.    ASA Status:  2 emergent.    NPO Status:  > 8 hours    Plan for General, RSI and ETT with Propofol induction. Maintenance will be Balanced.    PONV prophylaxis:  Ondansetron (or other 5HT-3) and Dexamethasone or Solumedrol       Postoperative Care  Postoperative pain management:  IV analgesics.      Consents  Anesthetic plan, risks, benefits and alternatives discussed with:  Patient.  Use of blood products discussed: Yes.   Use of  blood products discussed with Patient.  Consented to blood products.  .        Airway: 03/09/17; 1701; Mask Ventilation: Not attempted (RSI); Ease of Intubation: Easy; Airway Size: 7;  Cuffed;  Oral;  Blade Type: Fournier;  Blade Size: 2;  Place by: JODY Alvares;  Insertion Attempts: 1;  Secured at (cm)to lip: 21 cm;  Breath Sounds: Equal, clear and bilateral;  End Tidal CO2: Present;  Dentition: Intact;  Grade View of Cords: 1    Special Considerations: None     Plan:   - ASA 2  - GETA with standard ASA monitors, IV induction, balanced anesthetic  - RSI   - PIV x 1   - Antibiotics per Gastroenterology   - PONV prophylaxis  - Pain management with Fentanyl/dilaudid boluses    Lewis Madera MD  Anesthesiology Resident  [phone]

## 2017-06-09 NOTE — OR NURSING
Text to Dr. Marsh regarding need for [facility] Op Note in Epic.  Patient is on a hard stop until note is written

## 2017-06-09 NOTE — PROGRESS NOTES
S: Patient admitted voluntarily from the [facility] ED. Pt presented to the emergency department after swallowing three paperclips. Pt denied that it was a suicide attempt and reported that it was an act to relieve stress.    B: History of 10+ psych hospitalizations; PTSD, ADD, borderline personality disorder, and depression. Pt frequently swallows foreign objects and has had endoscopy removal of objects at least 8 times. Pt lives in a group home in [place]. She has a care team consisting of a , group home staff, a therapist, and an [employer] worker. One prior suicide attempt several years ago via OD on pills. Medical history of endometriosis, migraines, and lower back pain.     A: Pt is calm and cooperative during belongings assessment and admission profile interview. Pt acts and appears younger than physical age. Vital signs slightly elevated on admission. Pt denies SI/HI or AVH hallucinations. Pt denies urges to self-harm. Pt displays a flat affect.     R: Status 15, code 1, status individual observation, establish rapport and provide for safety, administer medications as ordered, nursing to continue to monitor

## 2017-06-09 NOTE — PLAN OF CARE
Problem: General Plan of Care (Inpatient Behavioral)  Goal: Team Discussion  Team Plan:   BEHAVIORAL TEAM DISCUSSION     Participants: Dr. Hugo GRIMES; Nguyen LOPEZ [facility]; Cal MORALES  Progress: Initial  Continued Stay Criteria/Rationale: Pt was admitted to [facility] station 30 for attempted self-harm of swallowing 3 paperclips. Pt will remain on the unit until she is able to stabilize her symptoms of mental health.   Medical/Physical: Deferred to medicine  Precautions:   Behavioral Orders   Procedures     Code 1 - Restrict to Unit     Routine Programming       As clinically indicated     Status 15       Every 15 minutes.     Status Individual Observation       Order Specific Question:   CONTINUOUS 24 hours / day       Answer:   Distance and Exceptions       Order Specific Question:   Distance       Answer:   5 feet       Order Specific Question:   Exceptions       Answer:   none     Suicide precautions     Plan: Monitor, assess, stabilize  Rationale for change in precautions or plan: N/A

## 2017-06-09 NOTE — PROGRESS NOTES
"Writer met with pt and completed psychosocial and team note. Pt was sullen, and teary with a hint of anger during the interview. Pt reports she has providers in the community and she lives at an independent group home through [facility]. Pt reports she is able to return home when d/c from hospital. Pt expressed frustration at not being able to do OT groups as arts and crafts is her hobby and coping skill. Pt stated she would work on her personal care plan over the weekend. Seemed frustrated about filling it out and stated \"can't you just use the one from my last admission?\"   CM follow-up with verifying pt's ongoing appointments and add to AVS. Pt not yet ready for d/c.   "

## 2017-06-09 NOTE — PROGRESS NOTES
06/09/17 1045   Patient Belongings   Did you bring any home meds/supplements to the hospital?  Yes   Patient Belongings cell phone/electronics;clothing;keys;money (see comment);shoes;purse;other (see comments)   Disposition of Belongings Locker #15   Belongings Search Yes     Locker #15: brown boots, clothes, spiral notebook, phone, fit bit, candy, feminine products, keys, ear buds, phone , pen, name tag, perfume, black wallet, mini computer, change, $7.00, 's License     Security: Visa # [id number], Visa# [phone], Visa #[id number], Visa #[id number], Visa#[phone], [employer] #[id number], [place] EBT #[id number], Social Security, Blue Checkbook       ADMISSION:  I am responsible for any personal items that are not sent to the safe or pharmacy. [facility] is not responsible for loss, theft or damage of any property in my possession.    Patient Signature _____________________ Date/Time _____________________    Staff Signature _______________________ Date/Time _____________________    2nd Staff person, if patient is unable/unwilling to sign  ___________________________________ Date/Time _____________________    DISCHARGE:  My personal items have been returned to me.   Patient Signature _____________________ Date/Time _____________________
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
## 2017-06-09 NOTE — PLAN OF CARE
Occupational Therapy:  Pt has yet to attend OT-facilitated group sessions.  Plan: Pt will be encouraged to attend groups as deemed safe to participate with OT media and be provided a self assessment form.  OT staff will explain the value of OT including pt in her treatment plan and offer options to meet her needs and identified goals.

## 2017-06-09 NOTE — CONSULTS
Note entered to satisfy consult order. Please see consult note from earlier today.     Huy Kelley MD  GI Fellow  [phone]

## 2017-06-09 NOTE — PROGRESS NOTES
06/09/17 1505   Behavioral Health   Hallucinations denies / not responding to hallucinations   Thinking poor concentration;distractable   Orientation time: oriented;date: oriented;place: oriented;person: oriented   Memory baseline memory   Insight poor   Judgement impaired   Eye Contact at examiner   Affect tense;blunted, flat   Mood anxious;depressed   Physical Appearance/Attire attire appropriate to age and situation;appears stated age;disheveled   Hygiene neglected grooming - unclean body, hair, teeth   Suicidality other (see comments)  (None Stated)   Self Injury other (see comment)  (None Stated)   Activity other (see comment);isolative;withdrawn  (card games, check in)   Speech clear;coherent   Medication Sensitivity no stated side effects;no observed side effects   Psychomotor / Gait steady;balanced     Pt.'s goal is to get medications. Pt.'s discharge plan is back home. Pt. Was excused from groups. Pt. Stated feeling depressed and anxious of 8/10. Pt. Stated no SI or SIB. Pt. needs prompts for ADLS. Pt.'s concern was needs medications. Pt.'s effective approaches was would like to work with female staff only. Pt.'s IMR of Ways to [place] was observed card game.

## 2017-06-09 NOTE — ED PROVIDER NOTES
History     Chief Complaint   Patient presents with     Swallowed Foreign Body     swallowed 3 straightened-out paperclips around 730pm     HPI  Nevin Alvarado is a 26 year old female who presents to the ED today after swallowing a foreign body. Patient reports swallowing 3 paper clips, 1 of which was curved and the other 2 were straightened out. Patient states that she has ingested foreign bodies 8 times in the past, the last time it was mickie pins, each time requiring an endoscopy for removal.    Patient reports multiple triggers including frustrations with her manager at work and conflict with her mother. She states that her depression usually fluctuates with good and bad days however for the last 2 weeks she reports increasing irritability. She states that everything seems to irritate her or upset her recently. For example, she reports having her psychiatry  appointment changed and subsequently missing her ride home the other day made her very upset. Currently in the ED, patient denies abdominal pain.         I have reviewed the Medications, Allergies, Past Medical and Surgical History, and Social History in the [employer] system.    PAST MEDICAL HISTORY:   Past Medical History:   Diagnosis Date     ADD (attention deficit disorder)      Anorexia nervosa with bulimia     history of; on Topamax     Anxiety      Borderline personality disorder      Depression      H/O self-harm      H/O swallowed foreign body     Recurrent issue     Lives in independent group home     due to debilitating mental illness     Migraine without aura     no known triggers; on Topamax bid and Imitrex PRN     Morbid obesity (H)      PTSD (post-traumatic stress disorder)      Rectal foreign body     Recurrent issue       PAST SURGICAL HISTORY:   Past Surgical History:   Procedure Laterality Date     ESOPHAGOSCOPY, GASTROSCOPY, DUODENOSCOPY (EGD), COMBINED N/A 3/9/2017    Procedure: COMBINED ESOPHAGOSCOPY, GASTROSCOPY, DUODENOSCOPY (EGD),  REMOVE FOREIGN BODY;  Surgeon: Avis Guzmán MD;  Location: [facility] OR     ESOPHAGOSCOPY, GASTROSCOPY, DUODENOSCOPY (EGD), COMBINED N/A 4/20/2017    Procedure: COMBINED ESOPHAGOSCOPY, GASTROSCOPY, DUODENOSCOPY (EGD), REMOVE FOREIGN BODY;  EGD removal Foregin body;  Surgeon: Lokesh Paula MD;  Location: [facility] OR     EXAM UNDER ANESTHESIA ANUS N/A 1/10/2017    Procedure: EXAM UNDER ANESTHESIA ANUS;  Surgeon: Annmarie Haynes MD;  Location: [facility] OR     HC REMOVE FECAL IMPACTION OR FB W ANESTHESIA N/A 12/18/2016    Procedure: REMOVE FECAL IMPACTION/FOREIGN BODY UNDER ANESTHESIA;  Surgeon: Soham Cano MD;  Location: [facility] OR     KNEE SURGERY Right     removed a small tissue mass from knee     LAPAROSCOPIC ABLATION ENDOMETRIOSIS       LAPAROTOMY EXPLORATORY N/A 1/10/2017    Procedure: LAPAROTOMY EXPLORATORY;  Surgeon: Annmarie Haynes MD;  Location: [facility] OR     lymph nodes removed from neck; benign  age 7     MAMMOPLASTY REDUCTION Bilateral      RELEASE CARPAL TUNNEL Bilateral      SIGMOIDOSCOPY FLEXIBLE N/A 1/10/2017    Procedure: SIGMOIDOSCOPY FLEXIBLE;  Surgeon: Annmarie Haynes MD;  Location: [facility] OR       FAMILY HISTORY:   Family History   Problem Relation Age of Onset     Type 2 Diabetes Maternal Grandmother      Type 2 Diabetes Paternal Grandmother      Breast Cancer Paternal Grandmother      CEREBROVASCULAR DISEASE Father 53     Myocardial Infarction No family hx of      Coronary Artery Disease Early Onset No family hx of      Ovarian Cancer No family hx of      Colon Cancer No family hx of        SOCIAL HISTORY:   Social History   Substance Use Topics     Smoking status: Never Smoker     Smokeless tobacco: Never Used     Alcohol use No     Current Facility-Administered Medications   Medication     hydrOXYzine (VISTARIL) injection 25 mg     Current Outpatient Prescriptions   Medication     acetaminophen (TYLENOL) 500 MG tablet     augmented betamethasone dipropionate  (DIPROLENE-AF) 0.05 % ointment     traMADol (ULTRAM) 50 MG tablet     levonorgestrel (MIRENA) 20 MCG/24HR IUD     LamoTRIgine (LAMICTAL PO)     ibuprofen (ADVIL/MOTRIN) 600 MG tablet     albuterol (ALBUTEROL) 108 (90 BASE) MCG/ACT Inhaler     senna (SENOKOT) 8.6 MG tablet     polyethylene glycol (MIRALAX/GLYCOLAX) powder     SUMATRIPTAN SUCCINATE PO     TOPIRAMATE PO     Desvenlafaxine Succinate (PRISTIQ PO)     Cholecalciferol (VITAMIN D3 PO)        Allergies   Allergen Reactions     Augmentin Swelling     Blood-Group Specific Substance Other (See Comments)     Patient has an anti-Cw and non-specific antibodies. Blood product orders may be delayed. Draw one red top and two purple top tubes for all type/screen/crossmatch orders.     Hydrocodone Nausea and Vomiting     vomiting for days     Influenza Vaccines Other (See Comments)     Oseltamivir Hives     med stopped, PN: med stopped     Vicodin [Hydrocodone-Acetaminophen] Nausea and Vomiting     Cefazolin Rash     Cephalosporins Rash         Review of Systems   Constitutional: Negative for fever.   Respiratory: Negative for shortness of breath.    Cardiovascular: Negative for chest pain.   Gastrointestinal: Negative for abdominal pain.   Psychiatric/Behavioral: Positive for agitation, behavioral problems, dysphoric mood and self-injury. The patient is nervous/anxious.    All other systems reviewed and are negative.      Physical Exam      Physical Exam   Constitutional: She is oriented to person, place, and time. No distress.   HENT:   Head: Atraumatic.   Mouth/Throat: Oropharynx is clear and moist. No oropharyngeal exudate.   Eyes: Pupils are equal, round, and reactive to light. No scleral icterus.   Cardiovascular: Normal heart sounds and intact distal pulses.    Pulmonary/Chest: Breath sounds normal. No respiratory distress.   Abdominal: Soft. Bowel sounds are normal. There is no tenderness. There is no rebound and no guarding.   Musculoskeletal: She exhibits no  edema or tenderness.   Neurological: She is alert and oriented to person, place, and time. She has normal reflexes. No cranial nerve deficit. She exhibits abnormal muscle tone. Coordination normal.   Skin: Skin is warm. No rash noted. She is not diaphoretic.   Psychiatric: Her mood appears anxious. Her speech is rapid and/or pressured. She is agitated. She expresses impulsivity. She exhibits a depressed mood.       ED Course     ED Course     Procedures   8:38 PM  The patient was seen and examined by Dr. Demarco in Room HW01.          Critical Care time:  none    Labs Ordered and Resulted from Time of ED Arrival Up to the Time of Departure from the ED   COMPREHENSIVE METABOLIC PANEL - Abnormal; Notable for the following:        Result Value    Chloride 113 (*)     Glucose 106 (*)     Bilirubin Total 0.1 (*)     All other components within normal limits   DRUG ABUSE SCREEN 6 CHEM DEP URINE ([facility]) - Abnormal; Notable for the following:     Benzodiazepine Qual Urine   (*)     Value: Positive   Cutoff for a positive benzodiazepine is greater than 200 ng/mL. This is an   unconfirmed screening result to be used for medical purposes only.      All other components within normal limits   ACETAMINOPHEN LEVEL   SALICYLATE LEVEL   CBC WITH PLATELETS DIFFERENTIAL   HCG QUALITATIVE URINE   MAY SALINE LOCK IV            Assessments & Plan (with Medical Decision Making)       I have reviewed the nursing notes.    I have reviewed the findings, diagnosis, plan and need for follow up with the patient.  Patient with history of borderline personality disorder now presenting once again with foreign body having swallowed 3 paper clips two of which were unfolded.  Patient states that she had been increasingly frustrated and felt as though swallowing foreign bodies would give her some control.  I discussed the case with the gastroenterologist on-call and recommendation is for patient to have the foreign bodies removed tonight patient  will be transported to the [facility] to undergo general anesthesia with endoscopic removal of the paper clips.  Patient will then be transported back to the [facility] where she will be admitted to a locked psychiatric unit.  Patient needs to be under close watch with risk of swallowing other foreign objects.    New Prescriptions    No medications on file       Final diagnoses:   Borderline personality disorder   Foreign body in stomach, initial encounter     ISterling , am serving as a trained medical scribe to document services personally performed by Sandoval Demarco MD, based on the provider's statements to me.   ISandoval MD, was physically present and have reviewed and verified the accuracy of this note documented by Sterling Mc.      6/8/2017   [facility], [place], EMERGENCY DEPARTMENT     Sandoval Demarco MD  06/08/17 7226

## 2017-06-10 PROCEDURE — 90853 GROUP PSYCHOTHERAPY: CPT

## 2017-06-10 PROCEDURE — 25000132 ZZH RX MED GY IP 250 OP 250 PS 637: Mod: GY | Performed by: PSYCHIATRY & NEUROLOGY

## 2017-06-10 PROCEDURE — A9270 NON-COVERED ITEM OR SERVICE: HCPCS | Mod: GY | Performed by: PSYCHIATRY & NEUROLOGY

## 2017-06-10 PROCEDURE — A9270 NON-COVERED ITEM OR SERVICE: HCPCS | Mod: GY | Performed by: EMERGENCY MEDICINE

## 2017-06-10 PROCEDURE — 25000132 ZZH RX MED GY IP 250 OP 250 PS 637: Mod: GY | Performed by: EMERGENCY MEDICINE

## 2017-06-10 PROCEDURE — 12400003 ZZH R&B MH CRITICAL UMMC

## 2017-06-10 RX ADMIN — LAMOTRIGINE 100 MG: 100 TABLET ORAL at 19:12

## 2017-06-10 RX ADMIN — TOPIRAMATE 50 MG: 50 TABLET, FILM COATED ORAL at 08:08

## 2017-06-10 RX ADMIN — DESVENLAFAXINE SUCCINATE 100 MG: 50 TABLET, EXTENDED RELEASE ORAL at 08:08

## 2017-06-10 RX ADMIN — MELATONIN TAB 3 MG 3 MG: 3 TAB at 00:48

## 2017-06-10 RX ADMIN — ACETAMINOPHEN 1000 MG: 500 TABLET ORAL at 19:12

## 2017-06-10 RX ADMIN — HYDROXYZINE HYDROCHLORIDE 50 MG: 25 TABLET ORAL at 00:48

## 2017-06-10 RX ADMIN — CHOLECALCIFEROL CAP 125 MCG (5000 UNIT) 5000 UNITS: 125 CAP at 08:08

## 2017-06-10 RX ADMIN — TOPIRAMATE 50 MG: 50 TABLET, FILM COATED ORAL at 19:12

## 2017-06-10 ASSESSMENT — ACTIVITIES OF DAILY LIVING (ADL)
ORAL_HYGIENE: INDEPENDENT
ORAL_HYGIENE: INDEPENDENT
GROOMING: INDEPENDENT
GROOMING: INDEPENDENT;PROMPTS
DRESS: INDEPENDENT;SCRUBS (BEHAVIORAL HEALTH)
DRESS: INDEPENDENT
LAUNDRY: WITH SUPERVISION

## 2017-06-10 NOTE — PROGRESS NOTES
06/09/17 3175   Behavioral Health   Hallucinations denies / not responding to hallucinations   Thinking poor concentration   Orientation person: oriented;place: oriented;date: oriented;time: oriented   Memory baseline memory   Insight poor   Judgement impaired   Eye Contact at examiner   Affect irritable;sad;blunted, flat   Mood irritable;anxious;depressed   Physical Appearance/Attire attire appropriate to age and situation   Hygiene other (see comment)  (Adequate)   Suicidality other (see comments)  (Denies)   Self Injury other (see comment);thoughts only;safety plan  (contracted for safety with staff)   Activity withdrawn;isolative   Speech coherent;clear   Medication Sensitivity no observed side effects;no stated side effects   Psychomotor / Gait balanced;steady   Psycho Education   Type of Intervention 1:1 intervention   Response participates with encouragement   Hours 0.5   Treatment Detail check in   Activities of Daily Living   Hygiene/Grooming independent   Oral Hygiene independent   Dress independent   Room Organization independent     Nevin was withdrawn and isolative for most of the evening. She stated she was feeling depressed 6/10 and had increased anxiety from her environment. She denied SI and stated she was having passive thoughts of SIB (contracted for safety). Nevin did not make any attempts or gestures of self harm/swallowing objects while on SIO. Her primary concern had to do with her dosage of medication, however, this was later resolved with nursing staff.

## 2017-06-10 NOTE — PROGRESS NOTES
06/10/17 1149   Behavioral Health   Hallucinations denies / not responding to hallucinations   Thinking poor concentration   Orientation time: oriented;date: oriented;place: oriented;person: oriented   Memory baseline memory   Insight poor   Judgement impaired   Eye Contact at examiner   Affect blunted, flat   Mood anxious;depressed   Physical Appearance/Attire neat;attire appropriate to age and situation;appears stated age   Hygiene other (see comment)  (adequate)   Suicidality other (see comments)  (None Stated or Observed)   Self Injury other (see comment)  (None Stated or Observed )   Activity other (see comment)  (groups, sleeping/napping, check in )   Speech clear;coherent   Medication Sensitivity no observed side effects;no stated side effects   Psychomotor / Gait steady;balanced     Pt. Didn't state a goal. Pt.'s discharge plan is back home. Pt. Attended and participated in groups. Pt. Stated depressed. Pt. Stated anxious of 5/10. Pt. Did not state SI or SIB. Pt.'s effective approaches is SIO. Pt. Was encouraged to shower but declined. Pt.'s concerns was sore throat from procedure.

## 2017-06-10 NOTE — PROGRESS NOTES
Initial OT Note: Attended OT group. She was quick in problem solving task requiring using complex visuospatial concepts. she assisted peers with ideas that were creative in planning and organizing. She seemed interested in being actively involved. She sat with a blanket wrapped around her, seemed comfortable with participating. Needed no redirection or limits explained. Affect was flat. She will be encouraged to attend OT groups and set goal focus.
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
## 2017-06-10 NOTE — PROGRESS NOTES
Patient became very upset with nurse after being offered her 50 mg dose of Lamictal. Patient stated that she was increased to 100 mg by her outpatient psychiatrist. Patient stated that she started at 25 mg for 2 weeks, then 50 mg for 2 weeks and then started on 100 mg on Monday 5/5/2017. On call psychiatrist called and dose will be changed to 100 mg at patient's request.

## 2017-06-11 PROCEDURE — 25000132 ZZH RX MED GY IP 250 OP 250 PS 637: Mod: GY | Performed by: PSYCHIATRY & NEUROLOGY

## 2017-06-11 PROCEDURE — A9270 NON-COVERED ITEM OR SERVICE: HCPCS | Mod: GY | Performed by: PSYCHIATRY & NEUROLOGY

## 2017-06-11 PROCEDURE — 12400003 ZZH R&B MH CRITICAL UMMC

## 2017-06-11 RX ADMIN — TOPIRAMATE 50 MG: 50 TABLET, FILM COATED ORAL at 19:42

## 2017-06-11 RX ADMIN — LAMOTRIGINE 100 MG: 100 TABLET ORAL at 19:42

## 2017-06-11 RX ADMIN — IBUPROFEN 600 MG: 600 TABLET ORAL at 19:42

## 2017-06-11 RX ADMIN — DESVENLAFAXINE SUCCINATE 100 MG: 50 TABLET, EXTENDED RELEASE ORAL at 07:35

## 2017-06-11 RX ADMIN — TOPIRAMATE 50 MG: 50 TABLET, FILM COATED ORAL at 07:35

## 2017-06-11 RX ADMIN — ACETAMINOPHEN 500 MG: 500 TABLET ORAL at 16:36

## 2017-06-11 RX ADMIN — CHOLECALCIFEROL CAP 125 MCG (5000 UNIT) 5000 UNITS: 125 CAP at 07:36

## 2017-06-11 ASSESSMENT — ACTIVITIES OF DAILY LIVING (ADL)
LAUNDRY: WITH SUPERVISION
ORAL_HYGIENE: INDEPENDENT;WITH SUPERVISION

## 2017-06-11 NOTE — PROGRESS NOTES
06/11/17 1535   Behavioral Health   Hallucinations denies / not responding to hallucinations   Thinking poor concentration   Orientation time: oriented;date: oriented;place: oriented;person: oriented   Memory baseline memory   Insight poor   Judgement (improved)   Eye Contact at examiner   Affect blunted, flat   Mood mood is calm;depressed   Physical Appearance/Attire attire appropriate to age and situation;neat;appears stated age   Hygiene well groomed   Suicidality other (see comments)  (None Stated or Observed)   Self Injury other (see comment)  (None Stated or Observed)   Activity other (see comment)  (sleeping/napping, check in, movies)   Speech clear;coherent   Medication Sensitivity no observed side effects;no stated side effects   Psychomotor / Gait steady;balanced     Pt.'s goal was to shower. Pt. Did not state discharge plans. Pt. Watched a movie with peers. Pt. Did not attend or participate in groups. Pt.'s IMR of Self-Reflection and Planning was not stated. Pt. Appeared flat, depressed, and calm. When asked mood Pt. Denied depression or anxiety. Pt. Did not state or have observed SI or SIB. Pt. Used pencil with staff monitoring and hair brush with this staff and gave back without a prompt. Pt. Showered with staff observation. Pt. Also organized room independently.

## 2017-06-11 NOTE — PROGRESS NOTES
06/10/17 2212   Behavioral Health   Hallucinations denies / not responding to hallucinations   Thinking intact   Orientation person: oriented;place: oriented;date: oriented   Memory baseline memory   Insight admits / accepts   Judgement intact   Eye Contact at examiner   Activities of Daily Living   Hygiene/Grooming independent   Oral Hygiene independent   Dress independent   Room Organization independent

## 2017-06-12 ENCOUNTER — APPOINTMENT (OUTPATIENT)
Dept: GENERAL RADIOLOGY | Facility: CLINIC | Age: 27
DRG: 885 | End: 2017-06-12
Attending: INTERNAL MEDICINE
Payer: MEDICARE

## 2017-06-12 ENCOUNTER — ANESTHESIA EVENT (OUTPATIENT)
Dept: SURGERY | Facility: CLINIC | Age: 27
End: 2017-06-12

## 2017-06-12 ENCOUNTER — APPOINTMENT (OUTPATIENT)
Dept: GENERAL RADIOLOGY | Facility: CLINIC | Age: 27
DRG: 885 | End: 2017-06-12
Attending: PSYCHIATRY & NEUROLOGY
Payer: MEDICARE

## 2017-06-12 ENCOUNTER — ANESTHESIA (OUTPATIENT)
Dept: SURGERY | Facility: CLINIC | Age: 27
End: 2017-06-12

## 2017-06-12 PROCEDURE — 37000008 ZZH ANESTHESIA TECHNICAL FEE, 1ST 30 MIN: Performed by: INTERNAL MEDICINE

## 2017-06-12 PROCEDURE — 74020 XR ABDOMEN 2 VW: CPT

## 2017-06-12 PROCEDURE — 27210794 ZZH OR GENERAL SUPPLY STERILE: Performed by: INTERNAL MEDICINE

## 2017-06-12 PROCEDURE — 36000053 ZZH SURGERY LEVEL 2 EA 15 ADDTL MIN - UMMC: Performed by: INTERNAL MEDICINE

## 2017-06-12 PROCEDURE — 25000566 ZZH SEVOFLURANE, EA 15 MIN: Performed by: INTERNAL MEDICINE

## 2017-06-12 PROCEDURE — 37000009 ZZH ANESTHESIA TECHNICAL FEE, EACH ADDTL 15 MIN: Performed by: INTERNAL MEDICINE

## 2017-06-12 PROCEDURE — 25000125 ZZHC RX 250: Performed by: PSYCHIATRY & NEUROLOGY

## 2017-06-12 PROCEDURE — 25000132 ZZH RX MED GY IP 250 OP 250 PS 637: Mod: GY | Performed by: PSYCHIATRY & NEUROLOGY

## 2017-06-12 PROCEDURE — 36000051 ZZH SURGERY LEVEL 2 1ST 30 MIN - UMMC: Performed by: INTERNAL MEDICINE

## 2017-06-12 PROCEDURE — A9270 NON-COVERED ITEM OR SERVICE: HCPCS | Mod: GY | Performed by: PSYCHIATRY & NEUROLOGY

## 2017-06-12 PROCEDURE — 71000014 ZZH RECOVERY PHASE 1 LEVEL 2 FIRST HR: Performed by: INTERNAL MEDICINE

## 2017-06-12 PROCEDURE — 74020 XR ABDOMEN 2 VW: CPT | Mod: 77

## 2017-06-12 PROCEDURE — 71000015 ZZH RECOVERY PHASE 1 LEVEL 2 EA ADDTL HR: Performed by: INTERNAL MEDICINE

## 2017-06-12 PROCEDURE — 12400003 ZZH R&B MH CRITICAL UMMC

## 2017-06-12 RX ADMIN — LAMOTRIGINE 100 MG: 100 TABLET ORAL at 20:35

## 2017-06-12 RX ADMIN — ONDANSETRON 8 MG: 4 TABLET, ORALLY DISINTEGRATING ORAL at 06:09

## 2017-06-12 RX ADMIN — CHOLECALCIFEROL CAP 125 MCG (5000 UNIT) 5000 UNITS: 125 CAP at 08:04

## 2017-06-12 RX ADMIN — TOPIRAMATE 50 MG: 50 TABLET, FILM COATED ORAL at 20:35

## 2017-06-12 RX ADMIN — DESVENLAFAXINE SUCCINATE 100 MG: 50 TABLET, EXTENDED RELEASE ORAL at 08:04

## 2017-06-12 RX ADMIN — ACETAMINOPHEN 1000 MG: 500 TABLET ORAL at 20:35

## 2017-06-12 RX ADMIN — TOPIRAMATE 50 MG: 50 TABLET, FILM COATED ORAL at 08:04

## 2017-06-12 ASSESSMENT — ACTIVITIES OF DAILY LIVING (ADL)
LAUNDRY: WITH SUPERVISION
ORAL_HYGIENE: INDEPENDENT
HYGIENE/GROOMING: INDEPENDENT
DRESS: INDEPENDENT

## 2017-06-12 NOTE — PLAN OF CARE
"Problem: General Plan of Care (Inpatient Behavioral)  Goal: Physician Review  An hour ago when the pt was asked if she felt safe to be removed from the 1:1, pt responded \"maybe.\" She said she felt reassured being on a 1:1. Despite this, she confessed that she swallowed a small staple and a small spring from inside a pen. This was discussed with the house Physician. Per discussion these foreign bodies will most likely pass without event, but a stomach XR will be ordered for tomorrow morning to see if they have passed or not. Pt tried to bargain to have her journal with staples. This was disallowed and put into her locker, and the order for the 1:1 was altered so that staff are much closer to pt. Staff verbalize understanding. Pt is now complaining that she does not have access to her coping skills (which involve ingestable elements).    Addendum: Pt continues to be asymptomatic and will assess for emergent gastric pain overnight. Pt said she ingested these items due to anxiety. She has not engaged in coping skills and chose to not participate in community meeting, nor has she mentioned to any staff the need to take PRN Rx. She was encouraged to utilize healthier coping skills and talk to her 1:1 staff.      "

## 2017-06-13 ENCOUNTER — APPOINTMENT (OUTPATIENT)
Dept: GENERAL RADIOLOGY | Facility: CLINIC | Age: 27
DRG: 885 | End: 2017-06-13
Attending: INTERNAL MEDICINE
Payer: MEDICARE

## 2017-06-13 VITALS
HEIGHT: 62 IN | HEART RATE: 73 BPM | BODY MASS INDEX: 41.59 KG/M2 | OXYGEN SATURATION: 95 % | DIASTOLIC BLOOD PRESSURE: 79 MMHG | SYSTOLIC BLOOD PRESSURE: 129 MMHG | TEMPERATURE: 98.6 F | WEIGHT: 226 LBS | RESPIRATION RATE: 12 BRPM

## 2017-06-13 PROCEDURE — 25000128 H RX IP 250 OP 636: Performed by: ANESTHESIOLOGY

## 2017-06-13 PROCEDURE — 25000125 ZZHC RX 250: Performed by: ANESTHESIOLOGY

## 2017-06-13 PROCEDURE — A9270 NON-COVERED ITEM OR SERVICE: HCPCS | Mod: GY | Performed by: PSYCHIATRY & NEUROLOGY

## 2017-06-13 PROCEDURE — 99239 HOSP IP/OBS DSCHRG MGMT >30: CPT | Performed by: PSYCHIATRY & NEUROLOGY

## 2017-06-13 PROCEDURE — 74000 XR ABDOMEN 1 VW: CPT

## 2017-06-13 PROCEDURE — 25000132 ZZH RX MED GY IP 250 OP 250 PS 637: Mod: GY | Performed by: PSYCHIATRY & NEUROLOGY

## 2017-06-13 RX ORDER — IBUPROFEN 600 MG/1
600 TABLET, FILM COATED ORAL EVERY 6 HOURS PRN
Status: DISCONTINUED | OUTPATIENT
Start: 2017-06-13 | End: 2017-06-13 | Stop reason: HOSPADM

## 2017-06-13 RX ORDER — HYDROXYZINE HYDROCHLORIDE 25 MG/1
25-50 TABLET, FILM COATED ORAL EVERY 4 HOURS PRN
Status: DISCONTINUED | OUTPATIENT
Start: 2017-06-13 | End: 2017-06-13 | Stop reason: HOSPADM

## 2017-06-13 RX ORDER — POLYETHYLENE GLYCOL 3350 17 G/17G
17 POWDER, FOR SOLUTION ORAL DAILY PRN
Status: DISCONTINUED | OUTPATIENT
Start: 2017-06-13 | End: 2017-06-13 | Stop reason: HOSPADM

## 2017-06-13 RX ORDER — SODIUM CHLORIDE, SODIUM LACTATE, POTASSIUM CHLORIDE, CALCIUM CHLORIDE 600; 310; 30; 20 MG/100ML; MG/100ML; MG/100ML; MG/100ML
INJECTION, SOLUTION INTRAVENOUS CONTINUOUS PRN
Status: DISCONTINUED | OUTPATIENT
Start: 2017-06-13 | End: 2017-06-13

## 2017-06-13 RX ORDER — SODIUM CHLORIDE, SODIUM LACTATE, POTASSIUM CHLORIDE, CALCIUM CHLORIDE 600; 310; 30; 20 MG/100ML; MG/100ML; MG/100ML; MG/100ML
INJECTION, SOLUTION INTRAVENOUS CONTINUOUS
Status: DISCONTINUED | OUTPATIENT
Start: 2017-06-13 | End: 2017-06-13 | Stop reason: HOSPADM

## 2017-06-13 RX ORDER — DESVENLAFAXINE 50 MG/1
100 TABLET, FILM COATED, EXTENDED RELEASE ORAL DAILY
Status: DISCONTINUED | OUTPATIENT
Start: 2017-06-13 | End: 2017-06-13 | Stop reason: HOSPADM

## 2017-06-13 RX ORDER — LAMOTRIGINE 100 MG/1
150 TABLET ORAL EVERY MORNING
COMMUNITY
Start: 2017-06-13 | End: 2018-03-19

## 2017-06-13 RX ORDER — NALOXONE HYDROCHLORIDE 0.4 MG/ML
.1-.4 INJECTION, SOLUTION INTRAMUSCULAR; INTRAVENOUS; SUBCUTANEOUS
Status: CANCELLED | OUTPATIENT
Start: 2017-06-13 | End: 2017-06-14

## 2017-06-13 RX ORDER — ACETAMINOPHEN 325 MG/1
650 TABLET ORAL EVERY 4 HOURS PRN
Status: DISCONTINUED | OUTPATIENT
Start: 2017-06-13 | End: 2017-06-13 | Stop reason: HOSPADM

## 2017-06-13 RX ORDER — FENTANYL CITRATE 50 UG/ML
INJECTION, SOLUTION INTRAMUSCULAR; INTRAVENOUS PRN
Status: DISCONTINUED | OUTPATIENT
Start: 2017-06-13 | End: 2017-06-13

## 2017-06-13 RX ORDER — SENNOSIDES 8.6 MG
1 TABLET ORAL 2 TIMES DAILY PRN
Status: DISCONTINUED | OUTPATIENT
Start: 2017-06-13 | End: 2017-06-13 | Stop reason: HOSPADM

## 2017-06-13 RX ORDER — ONDANSETRON 4 MG/1
4 TABLET, ORALLY DISINTEGRATING ORAL EVERY 30 MIN PRN
Status: DISCONTINUED | OUTPATIENT
Start: 2017-06-13 | End: 2017-06-13

## 2017-06-13 RX ORDER — SUMATRIPTAN 50 MG/1
50 TABLET, FILM COATED ORAL
Status: DISCONTINUED | OUTPATIENT
Start: 2017-06-13 | End: 2017-06-13 | Stop reason: HOSPADM

## 2017-06-13 RX ORDER — ONDANSETRON 2 MG/ML
4 INJECTION INTRAMUSCULAR; INTRAVENOUS EVERY 30 MIN PRN
Status: DISCONTINUED | OUTPATIENT
Start: 2017-06-13 | End: 2017-06-13 | Stop reason: HOSPADM

## 2017-06-13 RX ORDER — FENTANYL CITRATE 50 UG/ML
25-50 INJECTION, SOLUTION INTRAMUSCULAR; INTRAVENOUS EVERY 5 MIN PRN
Status: DISCONTINUED | OUTPATIENT
Start: 2017-06-13 | End: 2017-06-13 | Stop reason: HOSPADM

## 2017-06-13 RX ORDER — ONDANSETRON 4 MG/1
4-8 TABLET, ORALLY DISINTEGRATING ORAL EVERY 6 HOURS PRN
Status: DISCONTINUED | OUTPATIENT
Start: 2017-06-13 | End: 2017-06-13 | Stop reason: HOSPADM

## 2017-06-13 RX ORDER — DEXAMETHASONE SODIUM PHOSPHATE 4 MG/ML
INJECTION, SOLUTION INTRA-ARTICULAR; INTRALESIONAL; INTRAMUSCULAR; INTRAVENOUS; SOFT TISSUE PRN
Status: DISCONTINUED | OUTPATIENT
Start: 2017-06-13 | End: 2017-06-13

## 2017-06-13 RX ORDER — LAMOTRIGINE 100 MG/1
100 TABLET ORAL DAILY
Status: DISCONTINUED | OUTPATIENT
Start: 2017-06-13 | End: 2017-06-13 | Stop reason: HOSPADM

## 2017-06-13 RX ORDER — ALBUTEROL SULFATE 90 UG/1
2 AEROSOL, METERED RESPIRATORY (INHALATION) EVERY 4 HOURS PRN
Status: DISCONTINUED | OUTPATIENT
Start: 2017-06-13 | End: 2017-06-13 | Stop reason: HOSPADM

## 2017-06-13 RX ORDER — TOPIRAMATE 50 MG/1
50 TABLET, FILM COATED ORAL 2 TIMES DAILY
Status: DISCONTINUED | OUTPATIENT
Start: 2017-06-13 | End: 2017-06-13 | Stop reason: HOSPADM

## 2017-06-13 RX ORDER — ONDANSETRON 2 MG/ML
INJECTION INTRAMUSCULAR; INTRAVENOUS PRN
Status: DISCONTINUED | OUTPATIENT
Start: 2017-06-13 | End: 2017-06-13

## 2017-06-13 RX ORDER — LANOLIN ALCOHOL/MO/W.PET/CERES
3-6 CREAM (GRAM) TOPICAL
Status: DISCONTINUED | OUTPATIENT
Start: 2017-06-13 | End: 2017-06-13 | Stop reason: HOSPADM

## 2017-06-13 RX ORDER — FLUMAZENIL 0.1 MG/ML
0.2 INJECTION, SOLUTION INTRAVENOUS
Status: CANCELLED | OUTPATIENT
Start: 2017-06-13 | End: 2017-06-13

## 2017-06-13 RX ORDER — PROPOFOL 10 MG/ML
INJECTION, EMULSION INTRAVENOUS PRN
Status: DISCONTINUED | OUTPATIENT
Start: 2017-06-13 | End: 2017-06-13

## 2017-06-13 RX ADMIN — FENTANYL CITRATE 50 MCG: 50 INJECTION INTRAMUSCULAR; INTRAVENOUS at 03:58

## 2017-06-13 RX ADMIN — HYDROXYZINE HYDROCHLORIDE 50 MG: 25 TABLET ORAL at 11:20

## 2017-06-13 RX ADMIN — FENTANYL CITRATE 100 MCG: 50 INJECTION, SOLUTION INTRAMUSCULAR; INTRAVENOUS at 01:16

## 2017-06-13 RX ADMIN — SUCCINYLCHOLINE CHLORIDE 100 MG: 20 INJECTION, SOLUTION INTRAMUSCULAR; INTRAVENOUS at 01:20

## 2017-06-13 RX ADMIN — SODIUM CHLORIDE, POTASSIUM CHLORIDE, SODIUM LACTATE AND CALCIUM CHLORIDE: 600; 310; 30; 20 INJECTION, SOLUTION INTRAVENOUS at 02:10

## 2017-06-13 RX ADMIN — DEXAMETHASONE SODIUM PHOSPHATE 8 MG: 4 INJECTION, SOLUTION INTRA-ARTICULAR; INTRALESIONAL; INTRAMUSCULAR; INTRAVENOUS; SOFT TISSUE at 01:41

## 2017-06-13 RX ADMIN — PROPOFOL 140 MG: 10 INJECTION, EMULSION INTRAVENOUS at 01:20

## 2017-06-13 RX ADMIN — TOPIRAMATE 50 MG: 50 TABLET, FILM COATED ORAL at 09:26

## 2017-06-13 RX ADMIN — FENTANYL CITRATE 25 MCG: 50 INJECTION INTRAMUSCULAR; INTRAVENOUS at 02:58

## 2017-06-13 RX ADMIN — ONDANSETRON 4 MG: 2 INJECTION INTRAMUSCULAR; INTRAVENOUS at 02:51

## 2017-06-13 RX ADMIN — DESVENLAFAXINE SUCCINATE 100 MG: 50 TABLET, EXTENDED RELEASE ORAL at 09:26

## 2017-06-13 RX ADMIN — ONDANSETRON 4 MG: 2 INJECTION INTRAMUSCULAR; INTRAVENOUS at 01:42

## 2017-06-13 RX ADMIN — SODIUM CHLORIDE, POTASSIUM CHLORIDE, SODIUM LACTATE AND CALCIUM CHLORIDE: 600; 310; 30; 20 INJECTION, SOLUTION INTRAVENOUS at 01:08

## 2017-06-13 RX ADMIN — FENTANYL CITRATE 50 MCG: 50 INJECTION INTRAMUSCULAR; INTRAVENOUS at 03:21

## 2017-06-13 RX ADMIN — CHOLECALCIFEROL CAP 125 MCG (5000 UNIT) 5000 UNITS: 125 CAP at 09:26

## 2017-06-13 RX ADMIN — FENTANYL CITRATE 25 MCG: 50 INJECTION INTRAMUSCULAR; INTRAVENOUS at 02:31

## 2017-06-13 RX ADMIN — MIDAZOLAM HYDROCHLORIDE 2 MG: 1 INJECTION, SOLUTION INTRAMUSCULAR; INTRAVENOUS at 01:16

## 2017-06-13 ASSESSMENT — PAIN DESCRIPTION - DESCRIPTORS
DESCRIPTORS: SORE
DESCRIPTORS: SHARP
DESCRIPTORS: SORE

## 2017-06-13 ASSESSMENT — ACTIVITIES OF DAILY LIVING (ADL)
LAUNDRY: WITH SUPERVISION
ORAL_HYGIENE: INDEPENDENT
DRESS: INDEPENDENT
GROOMING: INDEPENDENT
ORAL_HYGIENE: INDEPENDENT
GROOMING: INDEPENDENT
DRESS: INDEPENDENT
LAUNDRY: WITH SUPERVISION

## 2017-06-13 NOTE — OR NURSING
Gave report to Akbar Molina RN on [facility] and she requested Xray orders be placed so follow-up Xrays will be done. Dr. Shelton - [facility] was messaged and she said she would place orders later this morning.

## 2017-06-13 NOTE — PROGRESS NOTES
Pt returned to station 30 via EMS after going to the [facility] for foreign body removal. Per nursing report, patient was vitally stable throughout and after the procedure. The operating team was unable to remove the objects as they had already passed through the stomach. The plan is to continue x-raying the patient to track progress of objects. Pt assisted by two staff to ambulate and lay down in bed. Pt was very drowsy and reported nausea. Pt given emesis basin and ice chips. Nursing to continue to monitor.

## 2017-06-13 NOTE — PLAN OF CARE
Problem: General Plan of Care (Inpatient Behavioral)  Goal: Physician Review  Pt complaining of stomach pain post foreign body ingestion but does not appear to be in acute distress. XR results reviewed with pt and Hospitalist. Per conversation with House Physician he defers to GI fellow. Per conversation with GI fellow, another abdominal XR will be repeated to assess status and aid in deciding to repeat EGD or let pass on own. Waiting to hear back from radiology about scheduling a time for this evening.    Addendum 2200: Pt safely back from XR- asymptomatic.    Addendum 2240: According to conversation with GI fellow Dr. Shelton, pt will need yet another emergent EGD for foreign body removal. Pt is NPO and will be transferred to [facility] with 1:1 staff and then will return to st. 30 post procedure. Dr. Shelton shared her opinion that the pt should not DC tomorrow.

## 2017-06-13 NOTE — ANESTHESIA POSTPROCEDURE EVALUATION
Patient: Nevin Alvarado    Procedure(s):  COMBINED ESOPHAGOSCOPY, GASTROSCOPY, DUODENOSCOPY (EGD) [[id number]]attempted removal of foreign body - Wound Class: II-Clean Contaminated    Diagnosis:Foreign Body Ingestion  Diagnosis Additional Information: No value filed.    Anesthesia Type:  General, ETT    Note:  Anesthesia Post Evaluation    Patient location during evaluation: PACU  Patient participation: Able to fully participate in evaluation  Level of consciousness: awake and alert  Pain management: adequate  Airway patency: patent  Cardiovascular status: acceptable  Respiratory status: acceptable  Hydration status: acceptable  PONV: none     Anesthetic complications: None          Last vitals:  Vitals:    06/13/17 0400 06/13/17 0415 06/13/17 0430   BP: 103/75 125/80 129/79   Pulse:      Resp: 14 12 12   Temp:   37  C (98.6  F)   SpO2: 99% 96% 95%         Electronically Signed By: Siddharth Arechiga DO  June 13, 2017  4:51 AM

## 2017-06-13 NOTE — ANESTHESIA CARE TRANSFER NOTE
Anesthesia Care Transfer Note    Patient: Nevin Alvarado    Transferred to: PACU    Patient vital signs: stable    Airway: Facemask      Pt transported to PACU with facemask and O2 at 6L. Noted throat discomfort, crying. VSS on arrival. Report given to PACU nurses on arrival.    Walter Cali MD

## 2017-06-13 NOTE — DISCHARGE INSTRUCTIONS
Behavioral Discharge Planning and Instructions    Summary:  You were admitted on 6/8/2017  because you swallowed paper clips when you became angry with your boss.  On 6/11, you swallowed a pen spring and another paperclip on the unit. You were placed on 1:1 monitoring. You were treated by Dr. Kodi Caeg MD and Dr. Stephen Arias MD. You asked for discharge as you and the MD felt like staying here was making you worse. You were discharged on 6/13/17 from [facility] back to your supportive living apartment program.    Main Diagnosis:   1.  Major depressive disorder, recurrent, moderate   2.  Borderline personality disorder.   3.  Generalized anxiety disorder.       Health Care Follow-up Appointments:     You have Medicare and signed the required paperwork at the time of discharge.  You have medical assistance and also have access to health care transportation through [facility].      You are on a civil commitment with a provisional discharge. Your provisional discharge was not revoked with this admission.      You are returning to the [facility] which is supportive living.  Contact is Kayla  Ph:[phone]  The Health Unit Coordinator has faxed these discharge instructions to fax: [phone]      You will need serial x-rays every 48 hours until the foreign objects passes. After a week if these do not pass then, you may need a CT scan.  Kaden 15, 2017  3:15 PM CDT   Office Visit with Sandra Ramos MD   [facility]    [address] [address] [phone]            Attend your psychiatry appointment on Wednesday, June 28, 2017 at 9AM  [facility] [address] [address] [phone]  The Health Unit Coordinator has faxed these discharge instructions to fax:[phone]        Attend your therapy appointment on Monday, June  19, 2017 at 12:30PM  [facility]  [address] [place] [address]  [phone]  The Health Unit Coordinator has faxed these discharge instructions to fax:[phone]      Continue to coordinate your services with your mental health .  Yajaira Quezada  Ph: [phone]  The Health Unit Coordinator has faxed these discharge instructions to fax: [phone]      You are going to reschedule with your [facility] worker  Dilcia Camilo and Tracey      You will work with your :  [facility]  [facility]      Coordinate your services with your [facility] .  [place]    Attend all scheduled appointments with your outpatient providers. Call at least 24 hours in advance if you need to reschedule an appointment to ensure continued access to your outpatient providers.   Major Treatments, Procedures and Findings:  You were provided with: a psychiatric assessment, assessed for medical stability and group therapy    You had a pregnancy test.  This was negative.      You had a drug test.  This was positive for benzodiazepines.      You had an endoscopies and x-rays to look for foreign objects that you ingested.      Symptoms to Report: thoughts of harming yourself    Early warning signs can include: increased thoughts or behaviors of suicide or self-harm     Safety and Wellness:  Take all medicines as directed.  Make no changes unless your doctor suggests them.      Follow treatment recommendations.  Refrain from alcohol and non-prescribed drugs.  If there is a concern for safety, call 911.    Resources:     [facility] Crisis Response  [phone]  [facility] crisis intervention program (24-hour hotline).   The main goal of the Crisis Response Unit is to stabilize an immediate crisis and can  provide such services as: telephone counseling, emergency food or shelter, and suicide  prevention. Serves all [facility] Residents 24  Hours a Day, 7 Days a Week  Ask them about going to the [facility]. This is a crisis residence where you can stay for a short time if you feel like you need to stabilize but do not need to go to the hospital.  [address] [place] [address]    Urgent Care for Adult Mental Health (Hospitals in [place] and [facility])   [address] [place] [address] - [facility] [place] - [phone],   Monday - Friday 8:00 a.m. to 9:00 p.m.   Saturday - Sunday 11:00 a.m. to 3:00 p.m.       [facility] 24/7 Community Call Center: [phone]  Phone: [phone] outside the [facility] area: [phone]  www.crisis.org   Hours: 24 hours/day, 7 days/week  Services: Free and confidential telephone counseling provided by trained volunteers  supervised by professional staff. Early intervention and brief supportive counseling for  callers with issues of depression, stress and anxiety, domestic violence, parent/child  conflicts, family issues, loneliness, grief and loss, suicidal ideation and chronic mental  Illness.      [employer] [place] [facility]  You and your family would benefit from the support groups at [employer] [place] [facility]- [place] Chapter.   Www.namihelps.org          The treatment team has appreciated the opportunity to work with you.     If you have any questions or concerns our unit number is [phone].

## 2017-06-13 NOTE — PROGRESS NOTES
Behavioral Discharge Planning and Instructions    Summary:  You were admitted on 6/8/2017  because you swallowed paper clips when you became angry with your boss.  On 6/11, you swallowed a pen spring and another paperclip on the unit. You were placed on 1:1 monitoring. You were treated by Dr. Kodi Cage MD and Dr. Stephen Arias MD. You asked for discharge as you and the MD felt like staying here was making you worse. You were discharged on 6/13/17 from [facility] back to your supportive living apartment program.    Main Diagnosis:   1.  Major depressive disorder, recurrent, moderate   2.  Borderline personality disorder.   3.  Generalized anxiety disorder.       Health Care Follow-up Appointments:     You have Medicare and signed the required paperwork at the time of discharge.  You have medical assistance and also have access to health care transportation through [facility].      You are on a civil commitment with a provisional discharge. Your provisional discharge was not revoked with this admission.      You are returning to the [facility] which is supportive living.  Contact is Kayla  Ph:[phone]  The Health Unit Coordinator has faxed these discharge instructions to fax: [phone]      You will need serial x-rays every 48 hours until the foreign objects passes. After a week if these do not pass then, you may need a CT scan.  Kaden 15, 2017  3:15 PM CDT   Office Visit with Sandra Ramos MD   [facility] ([facility]    [address] [address] [phone]            Attend your psychiatry appointment on Wednesday, June 28, 2017 at 9AM  [facility] [address] [address] [phone]  The Health Unit Coordinator has faxed these discharge instructions to fax:[phone]        Attend your therapy appointment on Monday,  June 19, 2017 at 12:30PM  [facility]   [facility]  [address] [place] [address]  [phone]  The Health Unit Coordinator has faxed these discharge instructions to fax:[phone]      Continue to coordinate your services with your mental health .  Yajaira Quezada  Ph: [phone]  The Health Unit Coordinator has faxed these discharge instructions to fax: [phone]      You are going to reschedule with your [facility] worker  Dilcia Camilo and Tracey      You will work with your :  [facility]  [facility]      Coordinate your services with your CAD[facility] .  [place]    Attend all scheduled appointments with your outpatient providers. Call at least 24 hours in advance if you need to reschedule an appointment to ensure continued access to your outpatient providers.   Major Treatments, Procedures and Findings:  You were provided with: a psychiatric assessment, assessed for medical stability and group therapy    You had a pregnancy test.  This was negative.      You had a drug test.  This was positive for benzodiazepines.      You had an endoscopies and x-rays to look for foreign objects that you ingested.      Symptoms to Report: thoughts of harming yourself    Early warning signs can include: increased thoughts or behaviors of suicide or self-harm     Safety and Wellness:  Take all medicines as directed.  Make no changes unless your doctor suggests them.      Follow treatment recommendations.  Refrain from alcohol and non-prescribed drugs.  If there is a concern for safety, call 911.    Resources:     [facility] Crisis Response  [phone]  [facility] crisis intervention program (24-hour hotline).   The main goal of the Crisis Response Unit is to stabilize an immediate crisis and can  provide such services as: telephone counseling, emergency food or shelter, and suicide  prevention. Serves all [facility] Residents  24 Hours a Day, 7 Days a Week  Ask them about going to the [facility] Res[facility]. This is a crisis residence where you can stay for a short time if you feel like you need to stabilize but do not need to go to the hospital.  [address] [place] [address]    [facility] ([facility])   [address] [place] [address] - [facility] [place] - [phone],   Monday - Friday 8:00 a.m. to 9:00 p.m.   Saturday - Sunday 11:00 a.m. to 3:00 p.m.       [facility] 24/7 Community Call Center: [phone]  Phone: [phone] outside the [facility] area: [phone]  [email or web address]   Hours: 24 hours/day, 7 days/week  Services: Free and confidential telephone counseling provided by trained volunteers  supervised by professional staff. Early intervention and brief supportive counseling for  callers with issues of depression, stress and anxiety, domestic violence, parent/child  conflicts, family issues, loneliness, grief and loss, suicidal ideation and chronic mental  Illness.      [facility] [place] [facility]  You and your family would benefit from the support groups at [facility] [place] [facility] [place] [facility].   Www.namihelps.org          The treatment team has appreciated the opportunity to work with you.     If you have any questions or concerns our unit number is [phone].

## 2017-06-13 NOTE — PROGRESS NOTES
06/13/17 1436   Behavioral Health   Hallucinations denies / not responding to hallucinations   Thinking intact   Orientation person: oriented;place: oriented;date: oriented;time: oriented   Memory baseline memory   Insight poor   Judgement impaired   Affect blunted, flat;tense;full range affect   Mood mood is calm   Physical Appearance/Attire neat   Hygiene well groomed   Suicidality other (see comments)  (pt denies)   Self Injury other (see comment)  (pt denies)   Activity other (see comment)  (pt visible in milieu)   Speech clear;coherent   Medication Sensitivity no stated side effects;no observed side effects   Psychomotor / Gait balanced;steady   Activities of Daily Living   Hygiene/Grooming independent   Oral Hygiene independent   Dress independent   Laundry with supervision   Room Organization independent

## 2017-06-13 NOTE — ANESTHESIA PREPROCEDURE EVALUATION
Anesthesia Evaluation     . Pt has had prior anesthetic. Type: General    No history of anesthetic complications          ROS/MED HX    ENT/Pulmonary:  - neg pulmonary ROS     Neurologic:     (+)migraines,     Cardiovascular:  - neg cardiovascular ROS       METS/Exercise Tolerance:     Hematologic:  - neg hematologic  ROS       Musculoskeletal:  - neg musculoskeletal ROS       GI/Hepatic:  - neg GI/hepatic ROS       Renal/Genitourinary:  - ROS Renal section negative       Endo:     (+) Obesity, .      Psychiatric: Comment: Hx of swallowing foreign objects, borderline personality disorder    (+) psychiatric history anxiety and depression      Infectious Disease:  - neg infectious disease ROS       Malignancy:      - no malignancy   Other:                   Procedure: Procedure(s):  COMBINED ESOPHAGOSCOPY, GASTROSCOPY, DUODENOSCOPY (EGD) [[id number]] - Wound Class: II-Clean Contaminated    HPI: Nevin Alvarado is a 26 year old female with PMHx significant for anxiety/depression, borderline personality disorder, obesity, hx of swallowing foreign objects in the past now presenting after swallowing paper clips for emergent EGD and FB removal.     PMHx/PSHx:  Past Medical History:   Diagnosis Date     ADD (attention deficit disorder)      Anorexia nervosa with bulimia     history of; on Topamax     Anxiety      Borderline personality disorder      Depression      H/O self-harm      H/O swallowed foreign body     Recurrent issue     Lives in independent group home     due to debilitating mental illness     Migraine without aura     no known triggers; on Topamax bid and Imitrex PRN     Morbid obesity (H)      PTSD (post-traumatic stress disorder)      Rectal foreign body     Recurrent issue       Past Surgical History:   Procedure Laterality Date     ESOPHAGOSCOPY, GASTROSCOPY, DUODENOSCOPY (EGD), COMBINED N/A 3/9/2017    Procedure: COMBINED ESOPHAGOSCOPY, GASTROSCOPY, DUODENOSCOPY (EGD), REMOVE FOREIGN BODY;   Surgeon: Avis Guzmán MD;  Location: [facility] OR     ESOPHAGOSCOPY, GASTROSCOPY, DUODENOSCOPY (EGD), COMBINED N/A 4/20/2017    Procedure: COMBINED ESOPHAGOSCOPY, GASTROSCOPY, DUODENOSCOPY (EGD), REMOVE FOREIGN BODY;  EGD removal Foregin body;  Surgeon: Lokesh Paula MD;  Location: [facility] OR     EXAM UNDER ANESTHESIA ANUS N/A 1/10/2017    Procedure: EXAM UNDER ANESTHESIA ANUS;  Surgeon: Annmarie Haynes MD;  Location: [facility] OR     HC REMOVE FECAL IMPACTION OR FB W ANESTHESIA N/A 12/18/2016    Procedure: REMOVE FECAL IMPACTION/FOREIGN BODY UNDER ANESTHESIA;  Surgeon: Soham Cano MD;  Location: [facility] OR     KNEE SURGERY Right     removed a small tissue mass from knee     LAPAROSCOPIC ABLATION ENDOMETRIOSIS       LAPAROTOMY EXPLORATORY N/A 1/10/2017    Procedure: LAPAROTOMY EXPLORATORY;  Surgeon: Annmarie Haynes MD;  Location: [facility] OR     lymph nodes removed from neck; benign  age 7     MAMMOPLASTY REDUCTION Bilateral      RELEASE CARPAL TUNNEL Bilateral      SIGMOIDOSCOPY FLEXIBLE N/A 1/10/2017    Procedure: SIGMOIDOSCOPY FLEXIBLE;  Surgeon: Annmarie Haynes MD;  Location: [facility] OR         No current facility-administered medications on file prior to encounter.   Current Outpatient Prescriptions on File Prior to Encounter:  acetaminophen (TYLENOL) 500 MG tablet Take 1-2 tablets (500-1,000 mg) by mouth every 8 hours as needed for mild pain   augmented betamethasone dipropionate (DIPROLENE-AF) 0.05 % ointment Apply to AA BID x 2-3 weeks then PRN only   traMADol (ULTRAM) 50 MG tablet Take 5 mg by mouth as needed   levonorgestrel (MIRENA) 20 MCG/24HR IUD 1 each (20 mcg) by Intrauterine route once for 1 dose   LamoTRIgine (LAMICTAL PO) Take 50 mg by mouth daily    ibuprofen (ADVIL/MOTRIN) 600 MG tablet Take 1 tablet (600 mg) by mouth every 6 hours as needed for moderate pain   albuterol (ALBUTEROL) 108 (90 BASE) MCG/ACT Inhaler Inhale 2 puffs into the lungs every 4 hours as needed for  shortness of breath / dyspnea   senna (SENOKOT) 8.6 MG tablet Take 1 tablet by mouth 2 times daily as needed for constipation   polyethylene glycol (MIRALAX/GLYCOLAX) powder Take 17 g by mouth daily as needed for constipation   SUMATRIPTAN SUCCINATE PO Take 50 mg by mouth once as needed for migraine Reported on 5/19/2017   TOPIRAMATE PO Take 50 mg by mouth in the morning and 100 mg by mouth in the evening   Desvenlafaxine Succinate (PRISTIQ PO) Take 100 mg by mouth daily   Cholecalciferol (VITAMIN D3 PO) Take 5,000 Units by mouth daily        Social Hx:   Social History   Substance Use Topics     Smoking status: Never Smoker     Smokeless tobacco: Never Used     Alcohol use No       Allergies:   Allergies   Allergen Reactions     Augmentin Swelling     Blood-Group Specific Substance Other (See Comments)     Patient has an anti-Cw and non-specific antibodies. Blood product orders may be delayed. Draw one red top and two purple top tubes for all type/screen/crossmatch orders.     Hydrocodone Nausea and Vomiting     vomiting for days     Influenza Vaccines Other (See Comments)     Oseltamivir Hives     med stopped, PN: med stopped     Vicodin [Hydrocodone-Acetaminophen] Nausea and Vomiting     Cefazolin Rash     Cephalosporins Rash         NPO Status: Per ASA Guidelines    Labs:    Blood Bank:  Lab Results   Component Value Date    ABO O 03/09/2017    [facility]  Pos 03/09/2017    AS Pos 03/09/2017     BMP:  Recent Labs   Lab Test  06/08/17   2145   NA  141   POTASSIUM  3.7   CHLORIDE  113*   CO2  23   BUN  14   CR  0.67   GLC  106*   KELBY  9.2     CBC:   Recent Labs   Lab Test  06/08/17   2145   WBC  9.8   RBC  4.30   HGB  12.7   HCT  38.2   MCV  89   MCH  29.5   MCHC  33.2   RDW  13.4   PLT  237     Coags:  Recent Labs   Lab Test  03/09/17   1611  01/31/15   2120   INR  1.12  1.10   PTT   --   37                        Anesthesia Plan      History & Physical Review  History and physical reviewed and following examination; no  interval change.    ASA Status:  2 emergent.        Plan for General and ETT with Intravenous induction. Maintenance will be Balanced.    PONV prophylaxis:  Ondansetron (or other 5HT-3) and Dexamethasone or Solumedrol  - ASA 2  - GETA with standard ASA monitors, IV induction, balanced anesthetic  - PIV   - Antibiotics per surgery  - PONV prophylaxis  - Pain management with Fentanyl/dilaudid boluses.    Tea Mccauley MD  Anesthesiology Resident CA-2        Postoperative Care  Postoperative pain management:  IV analgesics.      Consents  Anesthetic plan, risks, benefits and alternatives discussed with:  Patient..

## 2017-06-13 NOTE — PROGRESS NOTES
06/13/17 1400   Significant Event   Significant Event Other (see comments)     Pt was discharged today from [facility].  Discharge teaching, including f/u care and medication teaching, has been completed with pt.  Pt verbalized understanding.  At the time of discharge pt denies SI/SIB.

## 2017-06-13 NOTE — PROGRESS NOTES
Pt successfully transferred off [address] at 2350 via [facility] on a transport hold. She left with her 1:1 staff and tentative plan is for pt to return to [facility] overnight after PACU. Pt left in NAD, was hemodynamically stable and agreed to plan of care.

## 2017-06-13 NOTE — OP NOTE
[facility] Brief Operative Note    Pre-operative diagnosis: Foreign Body Ingestion   Post-operative diagnosis Foreign Body Ingestion   Procedure: Procedure(s):  COMBINED ESOPHAGOSCOPY, GASTROSCOPY, DUODENOSCOPY (EGD) [[id number]]attempted removal of foreign body - Wound Class: II-Clean Contaminated   Surgeon: Pamela Perez MD   Assistants(s): Dr. Christine Shelton   Estimated blood loss: None    Specimens: None   Findings: Retained food in stomach, no foreign body found

## 2017-06-13 NOTE — PROGRESS NOTES
06/12/17 2128   Behavioral Health   Hallucinations denies / not responding to hallucinations   Thinking intact   Orientation person: oriented;date: oriented;time: oriented;place: oriented   Memory baseline memory   Insight other (see comment)  (fair)   Judgement intact   Eye Contact at examiner   Affect blunted, flat   Mood depressed;anxious   Hygiene well groomed   Suicidality other (see comments)  (Pt denies.)   Self Injury thoughts only;other (see comment)  (very passive thoughts)   Activity other (see comment);isolative;withdrawn  (Somewhat present in the milieu)   Speech clear;coherent   Psychomotor / Gait balanced;steady   Coping/Psychosocial   Verbalized Emotional State anxiety;depression   Activities of Daily Living   Hygiene/Grooming independent   Oral Hygiene independent   Dress independent   Laundry with supervision   Room Organization independent   Significant Event   Significant Event Other (see comments)  (Shift Summary)   Pt denies SI but reports mild, passive SIB thoughts in the context of swallowing foreign objects. Pt rates her depression as a 5 out of 10 and anxiety as a 4 out of 10. Pt talked with staff about one of her previous hospital experiences at a different hospital than this one where she was on a medical unit and sneaked her medications onto the unit and overdosed. She stated that she felt badly about this especially because she thinks that one of her nurses there got terminated from his position due to her overdose. Pt states that her impulsivity gets the best of her in these situations. Pt's healthy coping skills are painting, woodworking, making dream catchers, listening to music and watching TV. Pt states that she has a good support system. Pt's goals were to talk to her physician about discharge plans, to have her x-rays reviewed and to get pain medication for the pain in her abdomen due to the ingestion of foreign objects the other day. Pt reports a lot of ongoing pain due to  this. Pt was cooperative and pleasant.

## 2017-06-13 NOTE — PROGRESS NOTES
"[facility], [place]   Psychiatric Progress Note         Interim History and Subjective Reports:   The patient's care was discussed with the treatment team during the daily team meeting.  Staff reports: no acute issues    She reported that her mood is improved however is growing increasingly frustrated with continued hospitalization and inability to use markers to color pictures in order to cope with her anxiety. She believes that her anxiety and residual mood symptoms would be better managed from the home setting or she can access her typical coping strategies. She has no desire or intent to swallow any nonfood items. She has done so recently as a means of reducing discomfort from anxiety without direct intent to cause herself harm.  Patient denies psychosis/AH/VH./paranoia.  Sleep, energy, appetite, and concentration are reported as fair-good.  Tolerating medications well without significant side effects           Scheduled Medications:           Allergies:      Allergies   Allergen Reactions     Augmentin Swelling     Blood-Group Specific Substance Other (See Comments)     Patient has an anti-Cw and non-specific antibodies. Blood product orders may be delayed. Draw one red top and two purple top tubes for all type/screen/crossmatch orders.     Hydrocodone Nausea and Vomiting     vomiting for days     Influenza Vaccines Other (See Comments)     Oseltamivir Hives     med stopped, PN: med stopped     Vicodin [Hydrocodone-Acetaminophen] Nausea and Vomiting     Cefazolin Rash     Cephalosporins Rash          Labs:   No results found for this or any previous visit (from the past 24 hour(s)).       Vitals:   /74  Pulse 73  Temp 98.2  F (36.8  C) (Oral)  Resp 16  Ht 1.575 m (5' 2\")  Wt 102.5 kg (226 lb)  SpO2 98%  BMI 41.34 kg/m2  Weight: 226 lbs 0 oz          Height: 5' 2\"           Body mass index is 41.34 kg/(m^2).        Mental Status Examination:   Appearance: awake, " alert  Attitude:  cooperative  Eye Contact:  fair  Mood:  anxious and better  Affect:  appropriate and in normal range  Speech:  clear, coherent  Psychomotor Behavior:  no evidence of tardive dyskinesia, dystonia, or tics  Throught Process:  logical and linear  Associations:  no loose associations  Thought Content:  no evidence of suicidal ideation or homicidal ideation and no evidence of psychotic thought  Insight:  fair  Judgement:  fair  Oriented to:  time, person, and place  Attention Span and Concentration:  fair  Recent and Remote Memory:  fair         DIagnoses:     1.  Major depressive disorder, recurrent, moderate   2.  Borderline personality disorder.   3.  Generalized anxiety disorder.            Plan:   1. Biological treatments:  -- continue current medications  -- she swallowed a staple and spring last night and is complaining of G.I. Upset. I was able to review the x-ray however it is pending the official report from the radiologist. The foreign body was noted on the x-ray. We will attempt to coordinate any need for additional medical interventions with the internal medicine team.    2. Psychosocial treatments:   --addressed with SW consult and groups  -- Continue one-to-one staffing to maintain safety given impulsive swallowing of objects    3. Dispo:  -- if the remainder of her hospitalization is uneventful, we will plan to discharge her back home tomorrow. The patient is in agreement with this plan. Ongoing hospitalization at this point is causing her additional frustration resulting in acting out behaviors and swallowing objects. She is able to contract for safety if returning home.

## 2017-06-14 ENCOUNTER — NURSE TRIAGE (OUTPATIENT)
Dept: NURSING | Facility: CLINIC | Age: 27
End: 2017-06-14

## 2017-06-15 ENCOUNTER — TELEPHONE (OUTPATIENT)
Dept: NURSING | Facility: CLINIC | Age: 27
End: 2017-06-15

## 2017-06-15 ENCOUNTER — HOSPITAL ENCOUNTER (EMERGENCY)
Facility: CLINIC | Age: 27
Discharge: HOME | End: 2017-06-15
Attending: EMERGENCY MEDICINE | Admitting: EMERGENCY MEDICINE
Payer: MEDICARE

## 2017-06-15 ENCOUNTER — OFFICE VISIT (OUTPATIENT)
Dept: INTERNAL MEDICINE | Facility: CLINIC | Age: 27
End: 2017-06-15
Payer: MEDICARE

## 2017-06-15 ENCOUNTER — TELEPHONE (OUTPATIENT)
Dept: GASTROENTEROLOGY | Facility: CLINIC | Age: 27
End: 2017-06-15

## 2017-06-15 ENCOUNTER — APPOINTMENT (OUTPATIENT)
Dept: GENERAL RADIOLOGY | Facility: CLINIC | Age: 27
End: 2017-06-15
Attending: EMERGENCY MEDICINE
Payer: MEDICARE

## 2017-06-15 VITALS
HEART RATE: 101 BPM | OXYGEN SATURATION: 96 % | TEMPERATURE: 98.4 F | SYSTOLIC BLOOD PRESSURE: 114 MMHG | BODY MASS INDEX: 40.84 KG/M2 | WEIGHT: 223.3 LBS | DIASTOLIC BLOOD PRESSURE: 78 MMHG

## 2017-06-15 VITALS
RESPIRATION RATE: 20 BRPM | DIASTOLIC BLOOD PRESSURE: 93 MMHG | BODY MASS INDEX: 41.59 KG/M2 | WEIGHT: 226 LBS | HEART RATE: 103 BPM | SYSTOLIC BLOOD PRESSURE: 142 MMHG | OXYGEN SATURATION: 100 % | TEMPERATURE: 97.7 F | HEIGHT: 62 IN

## 2017-06-15 DIAGNOSIS — T18.9XXD FOREIGN BODY INGESTION, SUBSEQUENT ENCOUNTER: ICD-10-CM

## 2017-06-15 DIAGNOSIS — T18.9XXD FOREIGN BODY ALIMENTARY TRACT, SUBSEQUENT ENCOUNTER: Primary | ICD-10-CM

## 2017-06-15 DIAGNOSIS — R10.84 ABDOMINAL PAIN, GENERALIZED: ICD-10-CM

## 2017-06-15 DIAGNOSIS — R11.0 NAUSEA: ICD-10-CM

## 2017-06-15 DIAGNOSIS — Z09 HOSPITAL DISCHARGE FOLLOW-UP: Primary | ICD-10-CM

## 2017-06-15 LAB
ALBUMIN UR-MCNC: NEGATIVE MG/DL
APPEARANCE UR: ABNORMAL
BACTERIA #/AREA URNS HPF: ABNORMAL /HPF
BILIRUB UR QL STRIP: NEGATIVE
COLOR UR AUTO: YELLOW
GLUCOSE UR STRIP-MCNC: NEGATIVE MG/DL
HCG UR QL: NEGATIVE
HGB UR QL STRIP: NEGATIVE
KETONES UR STRIP-MCNC: NEGATIVE MG/DL
LEUKOCYTE ESTERASE UR QL STRIP: NEGATIVE
MUCOUS THREADS #/AREA URNS LPF: PRESENT /LPF
NITRATE UR QL: NEGATIVE
PH UR STRIP: 5 PH (ref 5–7)
RBC #/AREA URNS AUTO: 1 /HPF (ref 0–2)
SP GR UR STRIP: 1.02 (ref 1–1.03)
SQUAMOUS #/AREA URNS AUTO: 2 /HPF (ref 0–1)
URN SPEC COLLECT METH UR: ABNORMAL
UROBILINOGEN UR STRIP-MCNC: 0 MG/DL (ref 0–2)
WBC #/AREA URNS AUTO: 3 /HPF (ref 0–2)

## 2017-06-15 PROCEDURE — 25000125 ZZHC RX 250: Performed by: EMERGENCY MEDICINE

## 2017-06-15 PROCEDURE — A9270 NON-COVERED ITEM OR SERVICE: HCPCS | Mod: GY | Performed by: EMERGENCY MEDICINE

## 2017-06-15 PROCEDURE — 99214 OFFICE O/P EST MOD 30 MIN: CPT | Performed by: INTERNAL MEDICINE

## 2017-06-15 PROCEDURE — 25000132 ZZH RX MED GY IP 250 OP 250 PS 637: Mod: GY | Performed by: EMERGENCY MEDICINE

## 2017-06-15 PROCEDURE — 74000 XR ABDOMEN 1 VW: CPT

## 2017-06-15 PROCEDURE — 99284 EMERGENCY DEPT VISIT MOD MDM: CPT

## 2017-06-15 PROCEDURE — 81001 URINALYSIS AUTO W/SCOPE: CPT | Performed by: EMERGENCY MEDICINE

## 2017-06-15 PROCEDURE — 81025 URINE PREGNANCY TEST: CPT | Performed by: EMERGENCY MEDICINE

## 2017-06-15 RX ORDER — IBUPROFEN 600 MG/1
600 TABLET, FILM COATED ORAL ONCE
Status: COMPLETED | OUTPATIENT
Start: 2017-06-15 | End: 2017-06-15

## 2017-06-15 RX ORDER — ONDANSETRON 4 MG/1
8 TABLET, ORALLY DISINTEGRATING ORAL ONCE
Status: COMPLETED | OUTPATIENT
Start: 2017-06-15 | End: 2017-06-15

## 2017-06-15 RX ORDER — TRAMADOL HYDROCHLORIDE 50 MG/1
50 TABLET ORAL ONCE
Status: COMPLETED | OUTPATIENT
Start: 2017-06-15 | End: 2017-06-15

## 2017-06-15 RX ORDER — ONDANSETRON 4 MG/1
4-8 TABLET, ORALLY DISINTEGRATING ORAL EVERY 8 HOURS PRN
Qty: 20 TABLET | Refills: 1 | Status: SHIPPED | OUTPATIENT
Start: 2017-06-15 | End: 2017-07-29

## 2017-06-15 RX ORDER — TOPIRAMATE 50 MG/1
25 TABLET, FILM COATED ORAL AT BEDTIME
COMMUNITY
Start: 2017-06-15 | End: 2017-08-21

## 2017-06-15 RX ADMIN — ONDANSETRON 8 MG: 4 TABLET, ORALLY DISINTEGRATING ORAL at 00:56

## 2017-06-15 RX ADMIN — IBUPROFEN 600 MG: 600 TABLET ORAL at 01:05

## 2017-06-15 RX ADMIN — TRAMADOL HYDROCHLORIDE 50 MG: 50 TABLET, COATED ORAL at 01:39

## 2017-06-15 ASSESSMENT — ENCOUNTER SYMPTOMS
DYSURIA: 0
ABDOMINAL PAIN: 1
NAUSEA: 1
LIGHT-HEADEDNESS: 1
VOMITING: 0

## 2017-06-15 NOTE — ED AVS SNAPSHOT
[facility] Emergency Department    [address] [place] [address]    Phone:  [phone]    Fax:  [phone]                                       Nevin Alvarado   MRN: [id number]    Department:  [facility] Emergency Department   Date of Visit:  6/15/2017           Patient Information     Date Of Birth          1990        Your diagnoses for this visit were:     Abdominal pain, generalized        You were seen by Cheo Gamez MD.      Follow-up Information     Follow up with Sandra Ramos MD. Schedule an appointment as soon as possible for a visit in 3 days.    Specialty:  Internal Medicine    Contact information:    [facility]  [address]  [facility] [address]  [phone]          Discharge Instructions       Discharge Instructions  Abdominal Pain    Abdominal pain can be caused by many things. Your evaluation today does not show the exact cause for your pain. Your doctor today has decided that it is unlikely your pain is due to a life threatening problem, or a problem requiring surgery or hospital admission. Sometimes those problems cannot be found right away, so it is very important that you follow up as directed.  Sometimes only the changes which occur over time allow the cause of your pain to be found.    Return to the Emergency Department for a recheck in 8-12 hours if your pain continues.  If your pain gets worse, changes in location, or feels different, return to the Emergency Department right away.    ADULTS:  Return to the Emergency Department right away if:      You get an oral temperature above 102oF or as directed by your doctor.    You have blood in your stools (bright red or black, tarry stools).    You keep throwing up or can t drink liquids.    You see blood when you throw up.    You can t have a bowel movement or you can t pass gas.    Your stomach gets bloated or bigger.    Your skin or the whites  of your eyes look yellow.    You faint.    You have bloody, frequent or painful urination.    You have new symptoms or anything that worries you.    CHILDREN:  Return to the Emergency Department right away if your child has any of the above-listed symptoms or the following:      Pushes your hand away or screams/cries when his/her belly is touched.    You notice your child is very fussy or weak.    Your child is very tired and is too tired to eat or drink.    Your child is dehydrated.  Signs of dehydration can be:  o Your infant has had no wet diapers in 4-5 hours.  o Your older child has not passed urine in 6-8 hours.  o Your infant or child starts to have dry mouth and lips, or no saliva or tears.    PREGNANT WOMEN:  Return to the Emergency Department right away if you have any of the above-listed symptoms or the following:      You have bleeding, leaking fluid or passing tissue from the vagina.    You have worse pain or cramping, or pain in your shoulder or back.    You have vomiting that will not stop.    You have painful or bloody urination.    You have a temperature of 100oF or more.    Your baby is not moving as much as usual.    You faint.    You get a bad headache with or without eye problems and abdominal pain.    You have a convulsion or seizure.    You have unusual discharge from your vagina and abdominal pain.    Abdominal pain is pretty common during pregnancy.  Your pain may or may not be related to your pregnancy. You should follow-up closely with your OB doctor so they can evaluate you and your baby.  Until you follow-up with your regular doctor, do the following:       Avoid sex and do not put anything in your vagina.    Drink clear fluids.    Only take medications approved by your doctor.    MORE INFORMATION:    Appendicitis:  A possible cause of abdominal pain in any person who still has their appendix is acute appendicitis. Appendicitis is often hard to diagnose.  Testing does not always rule out  "early appendicitis or other causes of abdominal pain. Close follow-up with your doctor and re-evaluations may be needed to figure out the reason for your abdominal pain.    Follow-up:  It is very important that you make an appointment with your clinic and go to the appointment.  If you do not follow-up with your primary doctor, it may result in missing an important development which could result in permanent injury or disability and/or lasting pain.  If there is any problem keeping your appointment, call your doctor or return to the Emergency Department.    Medications:  Take your medications as directed by your doctor today.  Before using over-the-counter medications, ask your doctor and make sure to take the medications as directed.  If you have any questions about medications, ask your doctor.    Diet:  Resume your normal diet as much as possible, but do not eat fried, fatty or spicy foods while you have pain.  Do not drink alcohol or have caffeine.  Do not smoke tobacco.    Probiotics: If you have been given an antibiotic, you may want to also take a probiotic pill or eat yogurt with live cultures. Probiotics have \"good bacteria\" to help your intestines stay healthy. Studies have shown that probiotics help prevent diarrhea and other intestine problems (including C. diff infection) when you take antibiotics. You can buy these without a prescription in the pharmacy section of the store.     If you were given a prescription for medicine here today, be sure to read all of the information (including the package insert) that comes with your prescription.  This will include important information about the medicine, its side effects, and any warnings that you need to know about.  The pharmacist who fills the prescription can provide more information and answer questions you may have about the medicine.  If you have questions or concerns that the pharmacist cannot address, please call or return to the Emergency " Department.             Discharge References/Attachments     SWALLOWED FOREIGN BODY (ADULT) (ENGLISH)      Future Appointments        Provider Department Dept Phone Center    6/15/2017 3:15 PM Sandra Ramos MD [facility] [phone]       24 Hour Appointment Hotline       To make an appointment at any [facility], call [phone] ([phone]). If you don't have a family doctor or clinic, we will help you find one. [facility] are conveniently located to serve the needs of you and your family.             Review of your medicines      Our records show that you are taking the medicines listed below. If these are incorrect, please call your family doctor or clinic.        Dose / Directions Last dose taken    acetaminophen 500 MG tablet   Commonly known as:  TYLENOL   Dose:  500-1000 mg   Quantity:  90 tablet        Take 1-2 tablets (500-1,000 mg) by mouth every 8 hours as needed for mild pain   Refills:  3        albuterol 108 (90 BASE) MCG/ACT Inhaler   Commonly known as:  albuterol   Dose:  2 puff   Quantity:  1 Inhaler        Inhale 2 puffs into the lungs every 4 hours as needed for shortness of breath / dyspnea   Refills:  0        augmented betamethasone dipropionate 0.05 % ointment   Commonly known as:  DIPROLENE-AF   Quantity:  45 g        Apply to AA BID x 2-3 weeks then PRN only   Refills:  3        ibuprofen 600 MG tablet   Commonly known as:  ADVIL/MOTRIN   Dose:  600 mg   Quantity:  30 tablet        Take 1 tablet (600 mg) by mouth every 6 hours as needed for moderate pain   Refills:  0        LAMICTAL 100 MG tablet   Dose:  100 mg   Generic drug:  lamoTRIgine        Take 1 tablet (100 mg) by mouth daily   Refills:  0        levonorgestrel 20 MCG/24HR IUD   Commonly known as:  MIRENA   Dose:  1 each        1 each (20 mcg) by Intrauterine route once for 1 dose   Refills:  0        polyethylene glycol powder   Commonly known as:  MIRALAX/GLYCOLAX   Dose:  17 g    Quantity:  510 g        Take 17 g by mouth daily as needed for constipation   Refills:  3        PRISTIQ PO   Dose:  100 mg        Take 100 mg by mouth daily   Refills:  0        senna 8.6 MG tablet   Commonly known as:  SENOKOT   Dose:  1 tablet   Quantity:  60 tablet        Take 1 tablet by mouth 2 times daily as needed for constipation   Refills:  0        SUMATRIPTAN SUCCINATE PO   Dose:  50 mg        Take 50 mg by mouth once as needed for migraine Reported on 5/19/2017   Refills:  0        TOPIRAMATE PO        Take 50 mg by mouth in the morning and 100 mg by mouth in the evening   Refills:  0        traMADol 50 MG tablet   Commonly known as:  ULTRAM   Dose:  5 mg        Take 5 mg by mouth as needed   Refills:  0        VITAMIN D3 PO   Dose:  5000 Units        Take 5,000 Units by mouth daily   Refills:  0                Procedures and tests performed during your visit     HCG qualitative urine    UA with Microscopic    XR Abdomen 1 View      Orders Needing Specimen Collection     None      Pending Results     Date and Time Order Name Status Description    6/15/2017 0051 XR Abdomen 1 View Preliminary             Pending Culture Results     No orders found from 6/13/2017 to 6/16/2017.            Pending Results Instructions     If you had any lab results that were not finalized at the time of your Discharge, you can call the ED Lab Result RN at [phone]. You will be contacted by this team for any positive Lab results or changes in treatment. The nurses are available 7 days a week from 10A to 6:30P.  You can leave a message 24 hours per day and they will return your call.        Test Results From Your Hospital Stay        6/15/2017  1:44 AM      Component Results     Component Value Ref Range & Units Status    Color Urine Yellow  Final    Appearance Urine Cloudy  Final    Glucose Urine Negative NEG mg/dL Final    Bilirubin Urine Negative NEG Final    Ketones Urine Negative NEG mg/dL Final    Specific Gravity  Urine 1.021 1.003 - 1.035 Final    Blood Urine Negative NEG Final    pH Urine 5.0 5.0 - 7.0 pH Final    Protein Albumin Urine Negative NEG mg/dL Final    Urobilinogen mg/dL 0.0 0.0 - 2.0 mg/dL Final    Nitrite Urine Negative NEG Final    Leukocyte Esterase Urine Negative NEG Final    Source Midstream Urine  Final    WBC Urine 3 (H) 0 - 2 /HPF Final    RBC Urine 1 0 - 2 /HPF Final    Bacteria Urine Few (A) NEG /HPF Final    Squamous Epithelial /HPF Urine 2 (H) 0 - 1 /HPF Final    Mucous Urine Present (A) NEG /LPF Final         6/15/2017  1:43 AM      Component Results     Component Value Ref Range & Units Status    HCG Qual Urine Negative NEG Final         6/15/2017  1:25 AM      Narrative     XR ABDOMEN 1 VW  6/15/2017 1:17 AM      HISTORY: Recent swallowed foreign body; now diffuse low abdominal  pain.     COMPARISON: 6/13/2017.        Impression     IMPRESSION: There is a metallic spring in the right midabdomen which  may be within ascending colon or small bowel. IUD in the midpelvis.  Bowel gas pattern is within normal limits.                Clinical Quality Measure: Blood Pressure Screening     Your blood pressure was checked while you were in the emergency department today. The last reading we obtained was  BP: (!) 142/93 . Please read the guidelines below about what these numbers mean and what you should do about them.  If your systolic blood pressure (the top number) is less than 120 and your diastolic blood pressure (the bottom number) is less than 80, then your blood pressure is normal. There is nothing more that you need to do about it.  If your systolic blood pressure (the top number) is 120-139 or your diastolic blood pressure (the bottom number) is 80-89, your blood pressure may be higher than it should be. You should have your blood pressure rechecked within a year by a primary care provider.  If your systolic blood pressure (the top number) is 140 or greater or your diastolic blood pressure (the  bottom number) is 90 or greater, you may have high blood pressure. High blood pressure is treatable, but if left untreated over time it can put you at risk for heart attack, stroke, or kidney failure. You should have your blood pressure rechecked by a primary care provider within the next 4 weeks.  If your provider in the emergency department today gave you specific instructions to follow-up with your doctor or provider even sooner than that, you should follow that instruction and not wait for up to 4 weeks for your follow-up visit.        Thank you for choosing [facility]       Thank you for choosing [facility] for your care. Our goal is always to provide you with excellent care. Hearing back from our patients is one way we can continue to improve our services. Please take a few minutes to complete the written survey that you may receive in the mail after you visit with us. Thank you!        [facility] Information     [facility] gives you secure access to your electronic health record. If you see a primary care provider, you can also send messages to your care team and make appointments. If you have questions, please call your primary care clinic.  If you do not have a primary care provider, please call [phone] and they will assist you.        Care EveryWhere ID     This is your Care EveryWhere ID. This could be used by other organizations to access your [facility] medical records  [id number]        After Visit Summary       This is your record. Keep this with you and show to your community pharmacist(s) and doctor(s) at your next visit.

## 2017-06-15 NOTE — ED NOTES
Pt states swallowed a pen spring and staple on Sunday or Monday, had an endoscopy at [facility] where they were unable to retrieve items.  States now having abdominal pain and nausea.    ABCs intact. AOx4

## 2017-06-15 NOTE — PATIENT INSTRUCTIONS
Please go to [facility] outpatient radiology for abdominal xray tomorrow.     GI will let you know the results (and whether another xray is going to be ordered for Monday).    ---    Please make follow-up appointment with psychiatry for next week at latest - you just came out of hospital for, in part, a psychiatric condition.     ---    Refills of Zofran sent in.

## 2017-06-15 NOTE — ED AVS SNAPSHOT
[facility] Emergency Department    [address]    [facility] [address]    Phone:  [phone]    Fax:  [phone]                                       Nevin Alvarado   MRN: [id number]    Department:  [facility] Emergency Department   Date of Visit:  6/15/2017           After Visit Summary Signature Page     I have received my discharge instructions, and my questions have been answered. I have discussed any challenges I see with this plan with the nurse or doctor.    ..........................................................................................................................................  Patient/Patient Representative Signature      ..........................................................................................................................................  Patient Representative Print Name and Relationship to Patient    ..................................................               ................................................  Date                                            Time    ..........................................................................................................................................  Reviewed by Signature/Title    ...................................................              ..............................................  Date                                                            Time

## 2017-06-15 NOTE — ED PROVIDER NOTES
History     Chief Complaint:  Swallowed Foreign Body    HPI   Nevin Alvarado is a 26 year old female who presents to the emergency department today for evaluation of swallowed foreign body. The patient was recently seen at the [facility] for this issue, but they could not remove the foreign bodies from the stomach as they are already passed through the pylorus. She has had the lower abdominal pain before today, but it is progressively more severe today including constant sharp, stabbing pains that are made worse with movement and tylenol did not relieve the pain. The patient feels lightheaded and nauseous. She reports having had one bowel movement and no problems urinating. The patient reports spotting. Of note, she has had a foreign body removal from her rectum in January.     Allergies:  Augmentin - swelling  Hydrocodone - nausea and vomiting  Oseltamivir - hives   Vicodin - nausea and vomiting  Cefazolin - rash  Cephalosporins - rash     Medications:   lamoTRIgine (LAMICTAL) 100 MG tablet   augmented betamethasone dipropionate (DIPROLENE-AF) 0.05 % ointment   traMADol (ULTRAM) 50 MG tablet   levonorgestrel (MIRENA) 20 MCG/24HR IUD   albuterol (ALBUTEROL) 108 (90 BASE) MCG/ACT Inhaler   senna (SENOKOT) 8.6 MG tablet   SUMATRIPTAN SUCCINATE PO   TOPIRAMATE PO   Desvenlafaxine Succinate (PRISTIQ PO)     Past Medical History:    ADD  Anxiety & depression  Anorexia with bulimia  Self-harm  Swallowed foreign body   Migraine   Morbid obesity   PTSD    Past Surgical History:    Foreign body removal x2  Endometriosis ablation  Mammoplasty reduction  Knee surgery    Family History:    Father - CVD    Social History:  The patient lives in a group home.  Marital Status:  Single [1]     Review of Systems   Gastrointestinal: Positive for abdominal pain and nausea. Negative for vomiting.   Genitourinary: Positive for vaginal bleeding (spotting). Negative for dysuria.   Neurological: Positive for  "light-headedness.   All other systems reviewed and are negative.    Physical Exam   Vitals:  Patient Vitals for the past 24 hrs:   BP Temp Temp src Pulse Heart Rate Resp SpO2 Height Weight   06/15/17 0036 (!) 142/93 97.7  F (36.5  C) Oral 103 103 20 100 % 1.575 m (5' 2\") 102.5 kg (226 lb)     Physical Exam    Constitutional:  Pleasant, age appropriate.       Resting comfortably in the bed.  Eyes:    Conjunctiva normal  Neck:    Supple, no meningismus.     CV:     Regular rate and rhythm.      No murmurs, rubs or gallops.     No lower extremity edema.  PULM:    Clear to auscultation bilateral.       No respiratory distress.      Good air exchange.     No rales or wheezing.  ABD:    Soft, non-distended.       Mild diffuse lower abdominal tenderness.     Bowel sounds normal.     No pulsatile masses.       No rebound, guarding or rigidity.     No CVA tenderness.      No hepatosplenomegaly.  MSK:     No gross deformity to all four extremities.   LYMPH:   No cervical lymphadenopathy.  NEURO:   Alert.  Good muscular tone, no atrophy.   Skin:    Warm, dry and intact.    Psych:    Mood is good and affect is appropriate.    Emergency Department Course     Imaging:  Radiology findings were communicated with the patient who voiced understanding of the findings.    Abdomen X-ray, 1 view  There is a metallic spring in the right midabdomen which  may be within ascending colon or small bowel. IUD in the midpelvis.  Bowel gas pattern is within normal limits.  Reading per radiology    Laboratory:  Laboratory findings were communicated with the patient who voiced understanding of the findings.    UA with micro: WBC 3 (H), bacteria few (A), squamous epithelial 2 (H), mucous present (A) o/w negative  HCG Qualitative Urine: negative     Interventions:  0056 Zofran 8 mg PO  0105 ibuprofen 500 mg PO  0139 tramadol 50 mg PO     Emergency Department Course:  Nursing notes and vitals reviewed.  I performed an exam of the patient as documented " above.   The patient provided a urine sample here in the emergency department. This was sent for laboratory testing, findings above.  The patient was sent for a abdomen x-ray, 1view while in the emergency department, results above.   At 0215 the patient was rechecked and was updated on the results of her laboratory and imaging studies.   I discussed the treatment plan with the patient. They expressed understanding of this plan and consented to discharge. They will be discharged home with instructions for care and follow up. In addition, the patient will return to the emergency department if their symptoms persist, worsen, if new symptoms arise or if there is any concern.  All questions were answered.  I personally reviewed the laboratory results with the patient and answered all related questions prior to discharge.    Impression & Plan      Medical Decision Makin-year-old female presenting by trauma with diffuse lower abdominal pain that she is attributed to her recent swallowed foreign body of a metallic spring.  On examination she appears well.  Her abdominal examination is benign with no concerns of intra-abdominal catastrophe.  Urinalysis is without signs of infection.  No hematuria to draw concern for ureteral colic.  She has no symptoms to suggest vaginitis, cervicitis or PID.  Plain films revealed that the foreign body resides likely within the ascending colon.  There is no sign of obstruction or free air.  I do not feel that further investigation is necessary at this time.  Patient clearly feels well despite her reports of ongoing pain as she was found asleep resting comfortably with no objective signs of pain at rest.  Patient safely discharged home and recommend close follow up with primary care physician.    Diagnosis:  1. Abdominal pain, generalized R10.84     Disposition:   The patient was discharged to home.    Scribe Disclosure:  Bryce MURRAY, am serving as a scribe at 5:45 AM on 6/15/2017  to document services personally performed by No att. providers found, based on my observations and the provider's statements to me.  6/15/2017   [facility] EMERGENCY DEPARTMENT       Cheo Gamez MD  06/15/17 9398

## 2017-06-15 NOTE — NURSING NOTE
"Chief Complaint   Patient presents with     Hospital F/U     Hosp f/u 6/8 - 6/13 and ED today       Initial /78  Pulse 101  Temp 98.4  F (36.9  C) (Oral)  Wt 223 lb 4.8 oz (101.3 kg)  SpO2 96%  BMI 40.84 kg/m2 Estimated body mass index is 40.84 kg/(m^2) as calculated from the following:    Height as of an earlier encounter on 6/15/17: 5' 2\" (1.575 m).    Weight as of this encounter: 223 lb 4.8 oz (101.3 kg).  Medication Reconciliation: complete   Marianela Ortega CMA      "

## 2017-06-15 NOTE — TELEPHONE ENCOUNTER
"  Reason for Disposition    [1] Abdominal pain AND [2] FB hasn't passed     \"I was just admitted to hospital for swallowing paperclips\". While in hospital caller swallowed additional paperclips and a pen spring that was stretched out. Nevin reports having x rays yesterday and foreign body had \"passed too far down\" for anything to be done.      Has abdominal pain and low grade fever tonight.    Additional Information    Negative: Any difficulty breathing (e.g., coughing, wheezing or stridor)    Negative: Sounds like a life-threatening emergency to the triager    Negative: Choked on or inhaled food or foreign body (but did not swallow it)    Negative: Symptoms of blocked esophagus (e.g., can't swallow normal secretions, drooling)    Negative: Symptoms of FB stuck in throat or esophagus (e.g., continued pain in throat or chest, FB sensation, blood-tinged saliva) (Exception: pill stuck)    Negative: Can't swallow water or bread    Negative: Sharp object  (e.g., needle, nail, safety pin, toothpick, bone, bottle cap, pull tab, dental bridge work)     (Exception: tiny chips of glass generally pass without any symptoms)    Negative: Battery of any type    Negative: Magnet    Negative: Packet of drugs (e.g., condom filled with cocaine)    Negative: Swallowed 2 or more FBs (e.g., multiple objects)    Negative: Swallowed a poisonous object (e.g., a lead sinker or a bullet)    Negative: Swallowed a (non-food) FB on purpose    Negative: SEVERE symptoms of pill stuck in throat or esophagus (e.g., severe pain, bleeding, or inability to swallow liquids)    Negative: Blood in the stools    Protocols used: SWALLOWED FOREIGN BODY-ADULT-AH    "

## 2017-06-15 NOTE — DISCHARGE INSTRUCTIONS
Discharge Instructions  Abdominal Pain    Abdominal pain can be caused by many things. Your evaluation today does not show the exact cause for your pain. Your doctor today has decided that it is unlikely your pain is due to a life threatening problem, or a problem requiring surgery or hospital admission. Sometimes those problems cannot be found right away, so it is very important that you follow up as directed.  Sometimes only the changes which occur over time allow the cause of your pain to be found.    Return to the Emergency Department for a recheck in 8-12 hours if your pain continues.  If your pain gets worse, changes in location, or feels different, return to the Emergency Department right away.    ADULTS:  Return to the Emergency Department right away if:      You get an oral temperature above 102oF or as directed by your doctor.    You have blood in your stools (bright red or black, tarry stools).    You keep throwing up or can t drink liquids.    You see blood when you throw up.    You can t have a bowel movement or you can t pass gas.    Your stomach gets bloated or bigger.    Your skin or the whites of your eyes look yellow.    You faint.    You have bloody, frequent or painful urination.    You have new symptoms or anything that worries you.    CHILDREN:  Return to the Emergency Department right away if your child has any of the above-listed symptoms or the following:      Pushes your hand away or screams/cries when his/her belly is touched.    You notice your child is very fussy or weak.    Your child is very tired and is too tired to eat or drink.    Your child is dehydrated.  Signs of dehydration can be:  o Your infant has had no wet diapers in 4-5 hours.  o Your older child has not passed urine in 6-8 hours.  o Your infant or child starts to have dry mouth and lips, or no saliva or tears.    PREGNANT WOMEN:  Return to the Emergency Department right away if you have any of the above-listed symptoms or  the following:      You have bleeding, leaking fluid or passing tissue from the vagina.    You have worse pain or cramping, or pain in your shoulder or back.    You have vomiting that will not stop.    You have painful or bloody urination.    You have a temperature of 100oF or more.    Your baby is not moving as much as usual.    You faint.    You get a bad headache with or without eye problems and abdominal pain.    You have a convulsion or seizure.    You have unusual discharge from your vagina and abdominal pain.    Abdominal pain is pretty common during pregnancy.  Your pain may or may not be related to your pregnancy. You should follow-up closely with your OB doctor so they can evaluate you and your baby.  Until you follow-up with your regular doctor, do the following:       Avoid sex and do not put anything in your vagina.    Drink clear fluids.    Only take medications approved by your doctor.    MORE INFORMATION:    Appendicitis:  A possible cause of abdominal pain in any person who still has their appendix is acute appendicitis. Appendicitis is often hard to diagnose.  Testing does not always rule out early appendicitis or other causes of abdominal pain. Close follow-up with your doctor and re-evaluations may be needed to figure out the reason for your abdominal pain.    Follow-up:  It is very important that you make an appointment with your clinic and go to the appointment.  If you do not follow-up with your primary doctor, it may result in missing an important development which could result in permanent injury or disability and/or lasting pain.  If there is any problem keeping your appointment, call your doctor or return to the Emergency Department.    Medications:  Take your medications as directed by your doctor today.  Before using over-the-counter medications, ask your doctor and make sure to take the medications as directed.  If you have any questions about medications, ask your doctor.    Diet:   "Resume your normal diet as much as possible, but do not eat fried, fatty or spicy foods while you have pain.  Do not drink alcohol or have caffeine.  Do not smoke tobacco.    Probiotics: If you have been given an antibiotic, you may want to also take a probiotic pill or eat yogurt with live cultures. Probiotics have \"good bacteria\" to help your intestines stay healthy. Studies have shown that probiotics help prevent diarrhea and other intestine problems (including C. diff infection) when you take antibiotics. You can buy these without a prescription in the pharmacy section of the store.     If you were given a prescription for medicine here today, be sure to read all of the information (including the package insert) that comes with your prescription.  This will include important information about the medicine, its side effects, and any warnings that you need to know about.  The pharmacist who fills the prescription can provide more information and answer questions you may have about the medicine.  If you have questions or concerns that the pharmacist cannot address, please call or return to the Emergency Department.           "

## 2017-06-15 NOTE — TELEPHONE ENCOUNTER
I called Ms Alvarado to follow up on her AXR in the ED last night. She was seen as she was having more pain. The spring is moving along- in ascending colon or small bowel. I'll order a repeat AXR tomorrow 6/16 and, if still not passed, another on Monday 6/19. She will be seen today with her primary care provider, Dr Alvarado.     Huy Kelley MD  GI Fellow  [phone]

## 2017-06-15 NOTE — PROGRESS NOTES
SUBJECTIVE:      Nevin Alvarado is a pleasant 26 year old female who presents for hospital follow-up:    Hospital:   Date of Admission: 6/8/17  Date of Discharge: 6/12/17  Reason(s) for Admission: swallowed paper clips    Diagnostic tests, treatments/interventions, and discharge summary reviewed.    PMH significant for multiple, active psychiatric conditions including depression, anxiety, BPD, and PTSD.    PMH significant for multiple admissions, procedures, and surgeries in ~8 months for foreign objects both ingested and inserted in rectum    - please review records from [place], , [facility], [facility], [facility], and [facility] for details    Summary of hospitalization:  - swallowed paper clips on 6/8/17   - underwent EGD 6/8/17 - unable to retrieve paper clips (had already passed)  - admitted to inpatient psychiatry for close monitoring and evaluation  - psychiatry attempted to change psychiatric medications, but patient refused adjustment  - swallowed pen spring and another paperclip on 6/11/17  - underwent another EGD 6/12/17 - unable to retrieve spring and paper clip (had already passed)    Medication changes since discharge: none  Adherent to discharge medications: yes  Problems taking discharge medications: no     Follow-up:   - has not yet scheduled follow-up with her psychiatrist  - serial AXRs to evaluate position of foreign objects    Update since discharge:   - re-presented to ER yesterday with abdominal pain  - AXR re-demonstrated spring - possibly in ascending colon, otherwise unremarkable  - exam unremarkable in spite of reported pain  - patient discharged home earlier today    Update since ER visit yesterday/today:  - no significant improvement   - continued RLQ abdominal pain   - severe, sharp, stabbing, and constant  - worse with walking, changing positions, talking, and laughing  - associated nausea, no vomiting - requests refill of Zofran ODT  - has not had a BM since 6/8/17 (cannot  use laxatives due to foreign objects and risk for perforation)  - no appetite, but forcing herself to eat cereal and granola bars (able to keep both down)    - no fevers or chills  - no chest pain or palpitations  - no shortness of breath or cough    OBJECTIVE:       /78  Pulse 101  Temp 98.4  F (36.9  C) (Oral)  Wt 223 lb 4.8 oz (101.3 kg)  SpO2 96%  BMI 40.84 kg/m2  Constitutional: well-appearing - comfortable and not in distress  Respiratory: normal respiratory effort; clear to auscultation bilaterally  Cardiovascular: regular rate and rhythm; no edema  Gastrointestinal: soft, non-distended, and bowel sounds present; non-tender with deep palpation using stethoscope; exquisitely tender with light manual palpation RLQ>LLQ   Musculoskeletal: normal gait and station  Psych: diminished insight; flattened affect; recent and remote memory intact    ASSESSMENT/PLAN:       (Z09) Hospital discharge follow-up  (primary encounter diagnosis)  (T18.9XXD) Foreign body ingestion, subsequent encounter  Comment:    - patient reports abdominal pain, but exam not consistent with reported pain.   - patient exam, ability to tolerate PO, and lack of red flag symptoms are reassuring.    - suspect constipation may, in part, be causing patient's abdominal pain.   Plan:    - AXR tomorrow.   - if spring still present, repeat AXR on Monday.    - patient encouraged to continue to eat and drink as tolerated.    - may use Tylenol as needed for pain.    - if symptoms worsen or change, patient to contact MD.      (R11.0) Nausea  Plan: refill of Zofran provided - patient encouraged to use sparingly as this can cause/worsen constipation.     The instructions on the AVS were discussed and explained to the patient. Patient expressed understanding of instructions.    Sandra Ramos MD   [facility] [address] [address] [place] [id number]  T: [phone], F: [phone]

## 2017-06-16 ENCOUNTER — NURSE TRIAGE (OUTPATIENT)
Dept: NURSING | Facility: CLINIC | Age: 27
End: 2017-06-16

## 2017-06-16 ENCOUNTER — HOSPITAL ENCOUNTER (OUTPATIENT)
Dept: GENERAL RADIOLOGY | Facility: CLINIC | Age: 27
Discharge: HOME | End: 2017-06-16
Attending: INTERNAL MEDICINE | Admitting: INTERNAL MEDICINE
Payer: MEDICARE

## 2017-06-16 DIAGNOSIS — T18.9XXD FOREIGN BODY ALIMENTARY TRACT, SUBSEQUENT ENCOUNTER: ICD-10-CM

## 2017-06-16 PROCEDURE — 74020 XR ABDOMEN 2 VW: CPT

## 2017-06-16 ASSESSMENT — PATIENT HEALTH QUESTIONNAIRE - PHQ9: SUM OF ALL RESPONSES TO PHQ QUESTIONS 1-9: 12

## 2017-06-16 NOTE — TELEPHONE ENCOUNTER
Reason for Disposition    Nursing judgment    Additional Information    Negative: Nursing judgment    Negative: Information only call; adult is not ill or injured    Protocols used: NO GUIDELINE OR REFERENCE AVAILABLE-ADULT-AH

## 2017-06-16 NOTE — TELEPHONE ENCOUNTER
Nevin was seen today in clinic  To follow up on hospital visit, and foreign body ingestion. She is not feeling worse tonight, no increased pain, no fever,  No bloating, but notes some increased urine frequency, and asks how we would determine if she has an infection . The blood work and UA done today are not indicating a current infection, however she is reminded things change with time. She is encouraged to  Return to ED if she feels worse, feels generally ill, like you would feel from flu, or has increased pain level, fever, chills, something different or worse, She has an x ray appointment tomorrow to follow the  Passage of objects. She agrees to come in to ED sooner if the pain is worse, moves, or she begins to feel weaker, more nauseated. She agrees to  Plan, and will call back if need to  Discuss again tonight .

## 2017-06-17 ENCOUNTER — HOSPITAL ENCOUNTER (EMERGENCY)
Facility: CLINIC | Age: 27
Discharge: HOME | End: 2017-06-17
Attending: EMERGENCY MEDICINE | Admitting: EMERGENCY MEDICINE
Payer: MEDICARE

## 2017-06-17 ENCOUNTER — APPOINTMENT (OUTPATIENT)
Dept: CT IMAGING | Facility: CLINIC | Age: 27
End: 2017-06-17
Attending: EMERGENCY MEDICINE
Payer: MEDICARE

## 2017-06-17 ENCOUNTER — NURSE TRIAGE (OUTPATIENT)
Dept: NURSING | Facility: CLINIC | Age: 27
End: 2017-06-17

## 2017-06-17 VITALS
WEIGHT: 223 LBS | SYSTOLIC BLOOD PRESSURE: 121 MMHG | TEMPERATURE: 98.3 F | HEIGHT: 62 IN | OXYGEN SATURATION: 94 % | DIASTOLIC BLOOD PRESSURE: 75 MMHG | RESPIRATION RATE: 16 BRPM | BODY MASS INDEX: 41.04 KG/M2

## 2017-06-17 DIAGNOSIS — T18.5XXD: ICD-10-CM

## 2017-06-17 DIAGNOSIS — T18.3XXA FOREIGN BODY IN SMALL INTESTINE, INITIAL ENCOUNTER: ICD-10-CM

## 2017-06-17 DIAGNOSIS — T18.9XXA SWALLOWED FOREIGN BODY, INITIAL ENCOUNTER: ICD-10-CM

## 2017-06-17 LAB
ANION GAP SERPL CALCULATED.3IONS-SCNC: 4 MMOL/L (ref 3–14)
BASOPHILS # BLD AUTO: 0 10E9/L (ref 0–0.2)
BASOPHILS NFR BLD AUTO: 0.1 %
BUN SERPL-MCNC: 13 MG/DL (ref 7–30)
CALCIUM SERPL-MCNC: 8.9 MG/DL (ref 8.5–10.1)
CHLORIDE SERPL-SCNC: 114 MMOL/L (ref 94–109)
CO2 SERPL-SCNC: 26 MMOL/L (ref 20–32)
CREAT SERPL-MCNC: 0.82 MG/DL (ref 0.52–1.04)
DIFFERENTIAL METHOD BLD: ABNORMAL
EOSINOPHIL # BLD AUTO: 0.2 10E9/L (ref 0–0.7)
EOSINOPHIL NFR BLD AUTO: 1.8 %
ERYTHROCYTE [DISTWIDTH] IN BLOOD BY AUTOMATED COUNT: 13.6 % (ref 10–15)
GFR SERPL CREATININE-BSD FRML MDRD: 85 ML/MIN/1.7M2
GLUCOSE SERPL-MCNC: 90 MG/DL (ref 70–99)
HCT VFR BLD AUTO: 36.1 % (ref 35–47)
HGB BLD-MCNC: 11.6 G/DL (ref 11.7–15.7)
IMM GRANULOCYTES # BLD: 0 10E9/L (ref 0–0.4)
IMM GRANULOCYTES NFR BLD: 0.2 %
LYMPHOCYTES # BLD AUTO: 1.6 10E9/L (ref 0.8–5.3)
LYMPHOCYTES NFR BLD AUTO: 18.8 %
MCH RBC QN AUTO: 29.2 PG (ref 26.5–33)
MCHC RBC AUTO-ENTMCNC: 32.1 G/DL (ref 31.5–36.5)
MCV RBC AUTO: 91 FL (ref 78–100)
MONOCYTES # BLD AUTO: 0.6 10E9/L (ref 0–1.3)
MONOCYTES NFR BLD AUTO: 7 %
NEUTROPHILS # BLD AUTO: 6.3 10E9/L (ref 1.6–8.3)
NEUTROPHILS NFR BLD AUTO: 72.1 %
NRBC # BLD AUTO: 0 10*3/UL
NRBC BLD AUTO-RTO: 0 /100
PLATELET # BLD AUTO: 239 10E9/L (ref 150–450)
POTASSIUM SERPL-SCNC: 3.8 MMOL/L (ref 3.4–5.3)
RBC # BLD AUTO: 3.97 10E12/L (ref 3.8–5.2)
SODIUM SERPL-SCNC: 144 MMOL/L (ref 133–144)
WBC # BLD AUTO: 8.7 10E9/L (ref 4–11)

## 2017-06-17 PROCEDURE — 96374 THER/PROPH/DIAG INJ IV PUSH: CPT | Mod: 59 | Performed by: EMERGENCY MEDICINE

## 2017-06-17 PROCEDURE — 25000128 H RX IP 250 OP 636: Performed by: EMERGENCY MEDICINE

## 2017-06-17 PROCEDURE — 85025 COMPLETE CBC W/AUTO DIFF WBC: CPT | Performed by: EMERGENCY MEDICINE

## 2017-06-17 PROCEDURE — 99285 EMERGENCY DEPT VISIT HI MDM: CPT | Mod: 25 | Performed by: EMERGENCY MEDICINE

## 2017-06-17 PROCEDURE — 96375 TX/PRO/DX INJ NEW DRUG ADDON: CPT | Performed by: EMERGENCY MEDICINE

## 2017-06-17 PROCEDURE — 25000128 H RX IP 250 OP 636

## 2017-06-17 PROCEDURE — 80048 BASIC METABOLIC PNL TOTAL CA: CPT | Performed by: EMERGENCY MEDICINE

## 2017-06-17 PROCEDURE — 25000125 ZZHC RX 250

## 2017-06-17 PROCEDURE — 74177 CT ABD & PELVIS W/CONTRAST: CPT

## 2017-06-17 PROCEDURE — 99284 EMERGENCY DEPT VISIT MOD MDM: CPT | Mod: Z6 | Performed by: EMERGENCY MEDICINE

## 2017-06-17 PROCEDURE — 96361 HYDRATE IV INFUSION ADD-ON: CPT | Performed by: EMERGENCY MEDICINE

## 2017-06-17 RX ORDER — ONDANSETRON 2 MG/ML
4 INJECTION INTRAMUSCULAR; INTRAVENOUS ONCE
Status: COMPLETED | OUTPATIENT
Start: 2017-06-17 | End: 2017-06-17

## 2017-06-17 RX ORDER — SODIUM CHLORIDE, SODIUM LACTATE, POTASSIUM CHLORIDE, CALCIUM CHLORIDE 600; 310; 30; 20 MG/100ML; MG/100ML; MG/100ML; MG/100ML
1000 INJECTION, SOLUTION INTRAVENOUS CONTINUOUS
Status: DISCONTINUED | OUTPATIENT
Start: 2017-06-17 | End: 2017-06-17 | Stop reason: HOSPADM

## 2017-06-17 RX ORDER — POLYETHYLENE GLYCOL 3350 17 G/17G
17 POWDER, FOR SOLUTION ORAL 2 TIMES DAILY
Qty: 510 G | Refills: 3 | Status: SHIPPED | OUTPATIENT
Start: 2017-06-17 | End: 2017-07-11

## 2017-06-17 RX ORDER — IOPAMIDOL 755 MG/ML
120 INJECTION, SOLUTION INTRAVASCULAR ONCE
Status: COMPLETED | OUTPATIENT
Start: 2017-06-17 | End: 2017-06-17

## 2017-06-17 RX ORDER — LIDOCAINE 40 MG/G
CREAM TOPICAL
Status: DISCONTINUED | OUTPATIENT
Start: 2017-06-17 | End: 2017-06-17 | Stop reason: HOSPADM

## 2017-06-17 RX ORDER — KETOROLAC TROMETHAMINE 30 MG/ML
30 INJECTION, SOLUTION INTRAMUSCULAR; INTRAVENOUS ONCE
Status: COMPLETED | OUTPATIENT
Start: 2017-06-17 | End: 2017-06-17

## 2017-06-17 RX ORDER — ONDANSETRON 2 MG/ML
INJECTION INTRAMUSCULAR; INTRAVENOUS
Status: COMPLETED
Start: 2017-06-17 | End: 2017-06-17

## 2017-06-17 RX ADMIN — KETOROLAC TROMETHAMINE 30 MG: 30 INJECTION, SOLUTION INTRAMUSCULAR at 19:50

## 2017-06-17 RX ADMIN — IOPAMIDOL 120 ML: 755 INJECTION, SOLUTION INTRAVENOUS at 20:31

## 2017-06-17 RX ADMIN — SODIUM CHLORIDE, POTASSIUM CHLORIDE, SODIUM LACTATE AND CALCIUM CHLORIDE 1000 ML: 600; 310; 30; 20 INJECTION, SOLUTION INTRAVENOUS at 19:50

## 2017-06-17 RX ADMIN — ONDANSETRON 4 MG: 2 INJECTION INTRAMUSCULAR; INTRAVENOUS at 20:16

## 2017-06-17 RX ADMIN — SODIUM CHLORIDE, PRESERVATIVE FREE 80 ML: 5 INJECTION INTRAVENOUS at 20:38

## 2017-06-17 ASSESSMENT — ENCOUNTER SYMPTOMS: ABDOMINAL PAIN: 1

## 2017-06-17 NOTE — ED PROVIDER NOTES
History     Chief Complaint   Patient presents with     Abdominal Pain     HPI  Nevin Alvarado is a 26 year old female with a history of recurrent rectal foreign body, recurrent swallowing foreign bodies, anxiety, borderline personality disorder, anxiety, and anorexia nervosa with bulimia who presents to the ED with abdominal pain. Per review of her chart, the patient was seen on 06/09/17 for an endoscopy for swallowed foreign objects, 06/15/17 for evaluation of the same swallowed foreign object, and multiple times at the ED in the past for abdominal pain. Patient states she doesn't believe the foreign objects she swallowed a couple of weeks ago has passed in her stools. She reports she has had worsening abdominal pain for the past week and states it is worse with walking or bending her waist at her torso.     PAST MEDICAL HISTORY  Past Medical History:   Diagnosis Date     ADD (attention deficit disorder)      Anorexia nervosa with bulimia     history of; on Topamax     Anxiety      Borderline personality disorder      Depression      H/O self-harm      H/O swallowed foreign body     Recurrent issue     Lives in independent group home     due to debilitating mental illness     Migraine without aura     no known triggers; on Topamax bid and Imitrex PRN     Morbid obesity (H)      PTSD (post-traumatic stress disorder)      Rectal foreign body     Recurrent issue     PAST SURGICAL HISTORY  Past Surgical History:   Procedure Laterality Date     ESOPHAGOSCOPY, GASTROSCOPY, DUODENOSCOPY (EGD), COMBINED N/A 3/9/2017    Procedure: COMBINED ESOPHAGOSCOPY, GASTROSCOPY, DUODENOSCOPY (EGD), REMOVE FOREIGN BODY;  Surgeon: Avis Guzmán MD;  Location:  OR     ESOPHAGOSCOPY, GASTROSCOPY, DUODENOSCOPY (EGD), COMBINED N/A 4/20/2017    Procedure: COMBINED ESOPHAGOSCOPY, GASTROSCOPY, DUODENOSCOPY (EGD), REMOVE FOREIGN BODY;  EGD removal Foregin body;  Surgeon: Lokesh Paula MD;  Location:  OR      ESOPHAGOSCOPY, GASTROSCOPY, DUODENOSCOPY (EGD), COMBINED N/A 6/12/2017    Procedure: COMBINED ESOPHAGOSCOPY, GASTROSCOPY, DUODENOSCOPY (EGD);  COMBINED ESOPHAGOSCOPY, GASTROSCOPY, DUODENOSCOPY (EGD) [[id number]]attempted removal of foreign body;  Surgeon: Pamela Perez MD;  Location: [facility] OR     ESOPHAGOSCOPY, GASTROSCOPY, DUODENOSCOPY (EGD), COMBINED N/A 6/9/2017    Procedure: COMBINED ESOPHAGOSCOPY, GASTROSCOPY, DUODENOSCOPY (EGD), REMOVE FOREIGN BODY;  Esophagoscopy, Gastroscopy, Duodenoscopy, Removal of Foreign Body;  Surgeon: Dejon Marsh MD;  Location: [facility] OR     EXAM UNDER ANESTHESIA ANUS N/A 1/10/2017    Procedure: EXAM UNDER ANESTHESIA ANUS;  Surgeon: Annmarie Haynes MD;  Location: [facility] OR     HC REMOVE FECAL IMPACTION OR FB W ANESTHESIA N/A 12/18/2016    Procedure: REMOVE FECAL IMPACTION/FOREIGN BODY UNDER ANESTHESIA;  Surgeon: Soham Cano MD;  Location: [facility] OR     KNEE SURGERY Right     removed a small tissue mass from knee     LAPAROSCOPIC ABLATION ENDOMETRIOSIS       LAPAROTOMY EXPLORATORY N/A 1/10/2017    Procedure: LAPAROTOMY EXPLORATORY;  Surgeon: Annmarie Haynes MD;  Location: [facility] OR     lymph nodes removed from neck; benign  age 7     MAMMOPLASTY REDUCTION Bilateral      RELEASE CARPAL TUNNEL Bilateral      SIGMOIDOSCOPY FLEXIBLE N/A 1/10/2017    Procedure: SIGMOIDOSCOPY FLEXIBLE;  Surgeon: Annmarie Haynes MD;  Location: [facility] OR     FAMILY HISTORY  Family History   Problem Relation Age of Onset     Type 2 Diabetes Maternal Grandmother      Type 2 Diabetes Paternal Grandmother      Breast Cancer Paternal Grandmother      CEREBROVASCULAR DISEASE Father 53     Myocardial Infarction No family hx of      Coronary Artery Disease Early Onset No family hx of      Ovarian Cancer No family hx of      Colon Cancer No family hx of      SOCIAL HISTORY  Social History   Substance Use Topics     Smoking status: Never Smoker     Smokeless tobacco: Never Used      "Alcohol use No     MEDICATIONS  No current facility-administered medications for this encounter.      Current Outpatient Prescriptions   Medication     polyethylene glycol (MIRALAX/GLYCOLAX) powder     topiramate (TOPAMAX) 50 MG tablet     ondansetron (ZOFRAN ODT) 4 MG ODT tab     lamoTRIgine (LAMICTAL) 100 MG tablet     acetaminophen (TYLENOL) 500 MG tablet     augmented betamethasone dipropionate (DIPROLENE-AF) 0.05 % ointment     traMADol (ULTRAM) 50 MG tablet     levonorgestrel (MIRENA) 20 MCG/24HR IUD     ibuprofen (ADVIL/MOTRIN) 600 MG tablet     albuterol (ALBUTEROL) 108 (90 BASE) MCG/ACT Inhaler     senna (SENOKOT) 8.6 MG tablet     SUMATRIPTAN SUCCINATE PO     Desvenlafaxine Succinate (PRISTIQ PO)     Cholecalciferol (VITAMIN D3 PO)     ALLERGIES  Allergies   Allergen Reactions     Augmentin Swelling     Blood-Group Specific Substance Other (See Comments)     Patient has an anti-Cw and non-specific antibodies. Blood product orders may be delayed. Draw one red top and two purple top tubes for all type/screen/crossmatch orders.     Hydrocodone Nausea and Vomiting     vomiting for days     Influenza Vaccines Other (See Comments)     Oseltamivir Hives     med stopped, PN: med stopped     Vicodin [Hydrocodone-Acetaminophen] Nausea and Vomiting     Cefazolin Rash     Cephalosporins Rash       I have reviewed the Medications, Allergies, Past Medical and Surgical History, and Social History in the Epic system.    Review of Systems   Gastrointestinal: Positive for abdominal pain.   All other systems reviewed and are negative.      Physical Exam   BP: (!) 136/104  Heart Rate: 98  Temp: 98.3  F (36.8  C)  Resp: 16  Height: 157.5 cm (5' 2\")  Weight: 101.2 kg (223 lb)  SpO2: 95 %  Physical Exam   Constitutional: She is oriented to person, place, and time. Vital signs are normal. She appears well-developed and well-nourished.  Non-toxic appearance. She does not appear ill. No distress.   Patient is awake and alert, she " appears uncomfortable but is otherwise mentating normally and in no acute distress.   HENT:   Head: Normocephalic and atraumatic.   Mouth/Throat: Oropharynx is clear and moist. No oropharyngeal exudate.   Eyes: Conjunctivae and EOM are normal. Pupils are equal, round, and reactive to light. No scleral icterus.   Neck: Normal range of motion. Neck supple. No JVD present. No tracheal deviation present. No thyromegaly present.   Cardiovascular: Normal rate, regular rhythm, normal heart sounds and intact distal pulses.  Exam reveals no gallop and no friction rub.    No murmur heard.  Pulmonary/Chest: Effort normal and breath sounds normal. No respiratory distress.   Abdominal: Soft. Bowel sounds are normal. She exhibits no distension and no mass. There is generalized tenderness (moderate tenderness to palpation without peritoneal signs.). There is no rigidity, no rebound and no guarding.   Musculoskeletal: Normal range of motion. She exhibits no edema or tenderness.   Lymphadenopathy:     She has no cervical adenopathy.   Neurological: She is alert and oriented to person, place, and time. She has normal strength. No cranial nerve deficit or sensory deficit.   Skin: Skin is warm and dry. No rash noted. No erythema. No pallor.   Psychiatric: She has a normal mood and affect. Her behavior is normal.   Nursing note and vitals reviewed.      ED Course     ED Course     Procedures   6:57 PM  The patient was seen and examined by Dr. Basurto in Room 11.           Results for orders placed or performed during the hospital encounter of 06/17/17   CT Abdomen Pelvis w Contrast    Narrative    EXAMINATION: CT ABDOMEN PELVIS W CONTRAST, 6/17/2017 8:41 PM    TECHNIQUE:  Helical CT images from the lung bases through the  symphysis pubis were obtained with IV contrast. Contrast dose: 120cc  of Isovue 370    COMPARISON: Abdominal radiograph 6/16/2017 CT 5/16/2017    HISTORY: Blunt Abdominal trauma    FINDINGS:    Abdomen and pelvis:  Asymmetric elevation of the right hemidiaphragm,  not significantly changed from 5/16/2017. The liver, gallbladder,  pancreas, spleen, adrenals and kidneys are within normal limits. No  hydronephrosis or hydroureter. Bladder is unremarkable. Intrauterine  device the central located within the endometrial cavity. 3.6 cm left  ovarian cyst, decreased from 5/16/2017. Major vasculature is patent.  Aorta is normal in caliber. No free air or substantial free fluid in  the abdomen. No lymphadenopathy in the abdomen or pelvis.    Metallic spring passes through the ileocecal valve, one end located in  the terminal ileum and the other within the cecum. This is similar in  position to radiograph dating back to 6/15/2017. No other foreign  bodies are identified. No evidence of bowel perforation or  obstruction. The colon, appendix and small bowel are otherwise within  normal limits.    Lung bases: Trace foci of atelectasis. Otherwise clear.    Bones and soft tissues: No acute fracture. No soft tissue evidence of  trauma. Transitional anatomy at the lumbosacral junction.       Impression    IMPRESSION:   1. No evidence of acute traumatic injury in the abdomen or pelvis.  2. Metallic spring straddles the ileocecal valve and appears similar  in position from 6/15/2017.    I have personally reviewed the examination and initial interpretation  and I agree with the findings.    ABAD LEVIN MD   CBC with platelets differential   Result Value Ref Range    WBC 8.7 4.0 - 11.0 10e9/L    RBC Count 3.97 3.8 - 5.2 10e12/L    Hemoglobin 11.6 (L) 11.7 - 15.7 g/dL    Hematocrit 36.1 35.0 - 47.0 %    MCV 91 78 - 100 fl    MCH 29.2 26.5 - 33.0 pg    MCHC 32.1 31.5 - 36.5 g/dL    RDW 13.6 10.0 - 15.0 %    Platelet Count 239 150 - 450 10e9/L    Diff Method Automated Method     % Neutrophils 72.1 %    % Lymphocytes 18.8 %    % Monocytes 7.0 %    % Eosinophils 1.8 %    % Basophils 0.1 %    % Immature Granulocytes 0.2 %    Nucleated RBCs 0 0  /100    Absolute Neutrophil 6.3 1.6 - 8.3 10e9/L    Absolute Lymphocytes 1.6 0.8 - 5.3 10e9/L    Absolute Monocytes 0.6 0.0 - 1.3 10e9/L    Absolute Eosinophils 0.2 0.0 - 0.7 10e9/L    Absolute Basophils 0.0 0.0 - 0.2 10e9/L    Abs Immature Granulocytes 0.0 0 - 0.4 10e9/L    Absolute Nucleated RBC 0.0    Basic metabolic panel   Result Value Ref Range    Sodium 144 133 - 144 mmol/L    Potassium 3.8 3.4 - 5.3 mmol/L    Chloride 114 (H) 94 - 109 mmol/L    Carbon Dioxide 26 20 - 32 mmol/L    Anion Gap 4 3 - 14 mmol/L    Glucose 90 70 - 99 mg/dL    Urea Nitrogen 13 7 - 30 mg/dL    Creatinine 0.82 0.52 - 1.04 mg/dL    GFR Estimate 85 >60 mL/min/1.7m2    GFR Estimate If Black >90   GFR Calc   >60 mL/min/1.7m2    Calcium 8.9 8.5 - 10.1 mg/dL            Assessments & Plan (with Medical Decision Making)   This patient presented to the emergency department complaining of abdominal pain in the setting of a known swallowed foreign body.  She reported that she had swallowed an open staple as well as a spring from a pen over a week ago, and that these have not passed.  Her abdominal exam is benign and vital signs are within normal limits.  No evidence of leukocytosis and CT scan of the abdomen demonstrates unchanged positioning of the spring and no evidence of the staple.  The spring is crossing through the ileocecal valve, and she does appear to have a moderate stool burden also.  I did discuss the case with the on-call gastroenterology follow-up, and we will start the patient on MiraLAX twice a day to help hasten passage of this spring.The GI clinic will contact the patient to set up repeat x-ray later this week.  She was discharged in good condition.    This part of the medical record was transcribed by Ry Ellis Scribadele, from a dictation done by Cornelio Basurto MD.       I have reviewed the nursing notes.    I have reviewed the findings, diagnosis, plan and need for follow up with the  patient.    Discharge Medication List as of 6/17/2017  9:47 PM          Final diagnoses:   Swallowed foreign body, initial encounter     I, Yury Hill, am serving as a trained medical scribe to document services personally performed by Cornelio Basurto MD, based on the provider's statements to me.      Cornelio MURRAY MD, was physically present and have reviewed and verified the accuracy of this note documented by Yury Hill.     6/17/2017   [facility], [place], EMERGENCY DEPARTMENT     Cornelio Basurto MD  06/22/17 1214

## 2017-06-17 NOTE — TELEPHONE ENCOUNTER
"  Reason for Disposition    [1] Non-severe abdominal pain AND [2] present > 1 hour     Known injury, describes pain as \"worse than before\" Severe pain.    Additional Information    Negative: Major injury from dangerous force or speed (e.g., MVA, fall > 10 feet or 3 meters)    Negative: Bullet wound    Negative: Knife wound (or other possibly deep wound)    Negative: Puncture wound that sounds life-threatening to the triager    Negative: [1] Major bleeding (e.g., actively dripping or spurting) AND [2] can't be stopped    Negative: Shock suspected (e.g., cold/pale/clammy skin, too weak to stand, low BP, rapid pulse)    Negative: Difficulty breathing    Negative: SEVERE abdominal pain    Negative: Sounds like a life-threatening emergency to the triager    Negative: Pregnant    Negative: Wound looks infected    Negative: Abdominal pain not from an injury - female    Negative: Abdominal pain not from an injury - male    Negative: [1] Injuries at more than 1 site AND [2] unsure which guideline to use    Protocols used: ABDOMINAL INJURY-ADULT-AH    Pt states her abdominal pain \"is getting worse'' and was hurting \"so bad I was sent home from work\".  Pt describes pain as \"worse than the last time I was in the ER\". Pt anxious on why \"the pen spring and staple have not passed.\"  Encouraged extra fluid intake to encourage bowel movement.  \"I have only went once since this happened.'\"  Pt eating/drinking/ denies passes blood except for \"alittle from my IUD which is my usual.\"  \"The ER doctor said just wait for this to pass.\"  Disposition is to be seen in ER for severe pain.  Increase fluid intake to help facilitate bowel movements.  "

## 2017-06-17 NOTE — ED AVS SNAPSHOT
[facility], Emergency Department    [address] [address]    Phone:  [phone]                                       Nvein Alvarado   MRN: [id number]    Department:  [facility], Emergency Department   Date of Visit:  6/17/2017           Patient Information     Date Of Birth          1990        Your diagnoses for this visit were:     Swallowed foreign body, initial encounter     Rectal foreign body, subsequent encounter        You were seen by Cornelio Basurto MD.        Discharge Instructions       Gastroenterology clinic will contact you to set up appointment for repeat x-ray.    MiraLAX as directed.    Tylenol and/or ibuprofen for pain.    Return to the emergency Department for any problems.      24 Hour Appointment Hotline       To make an appointment at any [facility] clinic, call [phone] ([phone]). If you don't have a family doctor or clinic, we will help you find one. [facility] clinics are conveniently located to serve the needs of you and your family.             Review of your medicines      CONTINUE these medicines which may have CHANGED, or have new prescriptions. If we are uncertain of the size of tablets/capsules you have at home, strength may be listed as something that might have changed.        Dose / Directions Last dose taken    polyethylene glycol powder   Commonly known as:  MIRALAX/GLYCOLAX   Dose:  17 g   What changed:    - when to take this  - reasons to take this   Quantity:  510 g        Take 17 g by mouth 2 times daily   Refills:  3          Our records show that you are taking the medicines listed below. If these are incorrect, please call your family doctor or clinic.        Dose / Directions Last dose taken    acetaminophen 500 MG tablet   Commonly known as:  TYLENOL   Dose:  500-1000 mg   Quantity:  90 tablet        Take 1-2 tablets (500-1,000 mg) by mouth every 8 hours as needed for mild pain   Refills:  3        albuterol 108  (90 BASE) MCG/ACT Inhaler   Commonly known as:  albuterol   Dose:  2 puff   Quantity:  1 Inhaler        Inhale 2 puffs into the lungs every 4 hours as needed for shortness of breath / dyspnea   Refills:  0        augmented betamethasone dipropionate 0.05 % ointment   Commonly known as:  DIPROLENE-AF   Quantity:  45 g        Apply to AA BID x 2-3 weeks then PRN only   Refills:  3        ibuprofen 600 MG tablet   Commonly known as:  ADVIL/MOTRIN   Dose:  600 mg   Quantity:  30 tablet        Take 1 tablet (600 mg) by mouth every 6 hours as needed for moderate pain   Refills:  0        LAMICTAL 100 MG tablet   Dose:  100 mg   Generic drug:  lamoTRIgine        Take 1 tablet (100 mg) by mouth daily   Refills:  0        levonorgestrel 20 MCG/24HR IUD   Commonly known as:  MIRENA   Dose:  1 each        1 each (20 mcg) by Intrauterine route once for 1 dose   Refills:  0        ondansetron 4 MG ODT tab   Commonly known as:  ZOFRAN ODT   Dose:  4-8 mg   Quantity:  20 tablet        Take 1-2 tablets (4-8 mg) by mouth every 8 hours as needed for nausea   Refills:  1        PRISTIQ PO   Dose:  100 mg        Take 100 mg by mouth daily   Refills:  0        senna 8.6 MG tablet   Commonly known as:  SENOKOT   Dose:  1 tablet   Quantity:  60 tablet        Take 1 tablet by mouth 2 times daily as needed for constipation   Refills:  0        SUMATRIPTAN SUCCINATE PO   Dose:  50 mg        Take 50 mg by mouth once as needed for migraine Reported on 5/19/2017   Refills:  0        TOPAMAX 50 MG tablet   Dose:  50 mg   Generic drug:  topiramate        Take 1 tablet (50 mg) by mouth 2 times daily   Refills:  0        traMADol 50 MG tablet   Commonly known as:  ULTRAM   Dose:  5 mg        Take 5 mg by mouth as needed   Refills:  0        VITAMIN D3 PO   Dose:  5000 Units        Take 5,000 Units by mouth daily   Refills:  0                Prescriptions were sent or printed at these locations (1 Prescription)                   Other Prescriptions                 Printed at Department/Unit printer (1 of 1)         polyethylene glycol (MIRALAX/GLYCOLAX) powder                Procedures and tests performed during your visit     Basic metabolic panel    CBC with platelets differential    CT Abdomen Pelvis w Contrast    Peripheral IV: Standard      Orders Needing Specimen Collection     None      Pending Results     Date and Time Order Name Status Description    6/17/2017 1902 CT Abdomen Pelvis w Contrast Preliminary             Pending Culture Results     No orders found from 6/15/2017 to 6/18/2017.            Pending Results Instructions     If you had any lab results that were not finalized at the time of your Discharge, you can call the ED Lab Result RN at [phone]. You will be contacted by this team for any positive Lab results or changes in treatment. The nurses are available 7 days a week from 10A to 6:30P.  You can leave a message 24 hours per day and they will return your call.        Thank you for choosing [facility]       Thank you for choosing [facility] for your care. Our goal is always to provide you with excellent care. Hearing back from our patients is one way we can continue to improve our services. Please take a few minutes to complete the written survey that you may receive in the mail after you visit with us. Thank you!        [facility] Information     [employer] gives you secure access to your electronic health record. If you see a primary care provider, you can also send messages to your care team and make appointments. If you have questions, please call your primary care clinic.  If you do not have a primary care provider, please call [phone] and they will assist you.        Care EveryWhere ID     This is your Care EveryWhere ID. This could be used by other organizations to access your [facility] medical records  [id number]        After Visit Summary       This is your record. Keep this with you and show to your community pharmacist(s) and  doctor(s) at your next visit.

## 2017-06-17 NOTE — ED NOTES
Pt. BIBA from home for abdominal pain and nausea after swallowing a small metal spring and staple. Discharged the 6/13 from [facility] after swallowing the objects. Pt. reports still not passing the spring and staple. Pt. A & O x 4. AVSS on RA.

## 2017-06-17 NOTE — ED AVS SNAPSHOT
[facility], [place], Emergency Department    [address] [place] [address]    Phone:  [phone]                                       Nevin Alvarado   MRN: [id number]    Department:  [facility], Emergency Department   Date of Visit:  6/17/2017           After Visit Summary Signature Page     I have received my discharge instructions, and my questions have been answered. I have discussed any challenges I see with this plan with the nurse or doctor.    ..........................................................................................................................................  Patient/Patient Representative Signature      ..........................................................................................................................................  Patient Representative Print Name and Relationship to Patient    ..................................................               ................................................  Date                                            Time    ..........................................................................................................................................  Reviewed by Signature/Title    ...................................................              ..............................................  Date                                                            Time

## 2017-06-17 NOTE — ED NOTES
"Bed: ED11  Expected date: 6/17/17  Expected time: 6:40 PM  Means of arrival: Ambulance  Comments:  [place]  26 y F  Constipation, Abdominal Pain, \"Believes she swallowed something\"  Yellow  "

## 2017-06-18 NOTE — DISCHARGE INSTRUCTIONS
Gastroenterology clinic will contact you to set up appointment for repeat x-ray.    MiraLAX as directed.    Tylenol and/or ibuprofen for pain.    Return to the emergency Department for any problems.

## 2017-06-19 ENCOUNTER — TELEPHONE (OUTPATIENT)
Dept: GASTROENTEROLOGY | Facility: CLINIC | Age: 27
End: 2017-06-19

## 2017-06-19 ENCOUNTER — HOSPITAL ENCOUNTER (OUTPATIENT)
Facility: CLINIC | Age: 27
End: 2017-06-19
Attending: INTERNAL MEDICINE | Admitting: INTERNAL MEDICINE

## 2017-06-19 DIAGNOSIS — T18.4XXA FOREIGN BODY IN COLON, INITIAL ENCOUNTER: Primary | ICD-10-CM

## 2017-06-19 LAB — PHQ9 SCORE: 11

## 2017-06-19 RX ORDER — BISACODYL 5 MG/1
15 TABLET, DELAYED RELEASE ORAL ONCE
Qty: 4 TABLET | Refills: 0 | Status: SHIPPED | OUTPATIENT
Start: 2017-06-19 | End: 2017-06-19

## 2017-06-19 NOTE — PROGRESS NOTES
Called patient re: plan. Will give golytely prep starting tomorrow (states that she hasn't had any stools on BID miralax) and then follow-up with 2 view AXR on Wednesday. If spring still in place, will plan on colonoscopy with MAC Thursday (will tentatively schedule). Suspect if she completes prep as instructed spring will pass.    Discussed with Dr. Chris Leary  GI fellow

## 2017-06-19 NOTE — TELEPHONE ENCOUNTER
Patient scheduled for Colonoscopy    Indication for procedure. Foreign body in colon, initial encounter     Referring Provider. Aquilino Leary MD    ? Not Needed    Arrival time verified? Yes, 1300    Facility location verified? Yes, [address]    Instructions given regarding prep and procedure    Prep Type [place] (Patient states PCP sent in Rx for her)    Are you taking any anticoagulants or blood thinners? No    Instructions given? N/A    Electronic implanted devices? No    Pre procedure teaching completed? Yes    Transportation from procedure? Medical Transportation    H&P / Pre op physical completed? Completed on 06/17/17

## 2017-06-20 ENCOUNTER — NURSE TRIAGE (OUTPATIENT)
Dept: NURSING | Facility: CLINIC | Age: 27
End: 2017-06-20

## 2017-06-21 ENCOUNTER — HOSPITAL ENCOUNTER (OUTPATIENT)
Dept: GENERAL RADIOLOGY | Facility: CLINIC | Age: 27
Discharge: HOME | End: 2017-06-21
Attending: INTERNAL MEDICINE | Admitting: INTERNAL MEDICINE
Payer: MEDICARE

## 2017-06-21 ENCOUNTER — DOCUMENTATION ONLY (OUTPATIENT)
Dept: OTHER | Facility: CLINIC | Age: 27
End: 2017-06-21

## 2017-06-21 DIAGNOSIS — T18.4XXD FOREIGN BODY IN COLON, SUBSEQUENT ENCOUNTER: Primary | ICD-10-CM

## 2017-06-21 DIAGNOSIS — T18.4XXA FOREIGN BODY IN COLON, INITIAL ENCOUNTER: ICD-10-CM

## 2017-06-21 PROCEDURE — 74020 XR ABDOMEN 2 VW: CPT

## 2017-06-21 NOTE — TELEPHONE ENCOUNTER
"Pt taking Go-Lytely for colonoscopy prep tonight. She is concerned about \"not getting cleaned out enough\" w/ the prep in order to have the test tomorrow. She's had several loose stools so far but is thinking she should be more cleared out by now and having more watery stools. Advised pt she still has several hours before test and she should expect loose stools to continue into the night. Reassured that most often the prep gets patients adequately cleaned out if the instructions are followed. Mavis Martinez RN/FNA    Additional Information    Negative: Nursing judgment    Negative: Information only call; adult is not ill or injured    Negative: Nursing judgment    Negative: Nursing judgment    Negative: Nursing judgment    Negative: Nursing judgment    Negative: Nursing judgment    Negative: Nursing judgment    Negative: Nursing judgment    Negative: Nursing judgment    Negative: Nursing judgment    Negative: Nursing judgment    Negative: Nursing judgment    Negative: Nursing judgment    Negative: Nursing judgment    Nursing judgment    Protocols used: NO GUIDELINE OR REFERENCE AVAILABLE-ADULT-AH    "

## 2017-06-21 NOTE — PROGRESS NOTES
Abdominal XRay reviewed--> spring now in R/Transverse colon  She did complete her prep and was reportedly clear at the end.  She does not want to miss additional work  Discussed with Dr. Perez, as spring is now in colon high rates of spontaneous passage    Recommend increasing baseline bowel regimen to:  2 caps miralax bid  2 tabs senna bid    If >3 loose stools >3 days in a row, can decrease to  1 cap miralax bid  1 tab senna bid     and stop senna if still > 3 loose stools     Repeat AXR next week    These medication changes were discussed with her group home with clear instructions as stated above    Discussed with Dr. Chris Leary  GI fellow

## 2017-06-22 ENCOUNTER — CARE COORDINATION (OUTPATIENT)
Dept: GASTROENTEROLOGY | Facility: CLINIC | Age: 27
End: 2017-06-22

## 2017-06-22 NOTE — LETTER
June 22, 2017      RE: Nevin Alvarado  [address]   [facility] [address]           Recommend increasing baseline bowel regimen to:  2 caps miralax twice a day  2 tabs senna twice a day     If greater than 3 loose stools for more than 3 days in a row, can decrease to  1 cap miralax twice a day  1 tab senna twice a day      stop senna if still having greater than 3 loose stools after decreasing to 1 cap miralax twice a day.     Repeat Abdominal XRay next week.        Aquilino Leary MD  Department of Gastroenterology

## 2017-06-22 NOTE — PROGRESS NOTES
Received call from patient care team at [facility] regarding new orders from Dr. Leary.    Care team requesting written orders faxed to them.    Letter with bowel regimen recommendations faxed to [phone]

## 2017-06-25 ENCOUNTER — NURSE TRIAGE (OUTPATIENT)
Dept: NURSING | Facility: CLINIC | Age: 27
End: 2017-06-25

## 2017-06-26 ENCOUNTER — HOSPITAL ENCOUNTER (EMERGENCY)
Facility: CLINIC | Age: 27
Discharge: HOME | End: 2017-06-26
Attending: EMERGENCY MEDICINE | Admitting: EMERGENCY MEDICINE
Payer: MEDICARE

## 2017-06-26 ENCOUNTER — DOCUMENTATION ONLY (OUTPATIENT)
Dept: OTHER | Facility: CLINIC | Age: 27
End: 2017-06-26

## 2017-06-26 ENCOUNTER — APPOINTMENT (OUTPATIENT)
Dept: GENERAL RADIOLOGY | Facility: CLINIC | Age: 27
End: 2017-06-26
Attending: EMERGENCY MEDICINE
Payer: MEDICARE

## 2017-06-26 VITALS
RESPIRATION RATE: 16 BRPM | HEART RATE: 87 BPM | BODY MASS INDEX: 41.04 KG/M2 | HEIGHT: 62 IN | WEIGHT: 223 LBS | TEMPERATURE: 97.7 F | DIASTOLIC BLOOD PRESSURE: 64 MMHG | OXYGEN SATURATION: 98 % | SYSTOLIC BLOOD PRESSURE: 119 MMHG

## 2017-06-26 DIAGNOSIS — R10.11 ABDOMINAL PAIN, RIGHT UPPER QUADRANT: ICD-10-CM

## 2017-06-26 LAB
ALBUMIN UR-MCNC: NEGATIVE MG/DL
APPEARANCE UR: CLEAR
BILIRUB UR QL STRIP: NEGATIVE
COLOR UR AUTO: YELLOW
GLUCOSE UR STRIP-MCNC: NEGATIVE MG/DL
HCG UR QL: NEGATIVE
HGB UR QL STRIP: NEGATIVE
KETONES UR STRIP-MCNC: NEGATIVE MG/DL
LEUKOCYTE ESTERASE UR QL STRIP: ABNORMAL
MUCOUS THREADS #/AREA URNS LPF: PRESENT /LPF
NITRATE UR QL: NEGATIVE
PH UR STRIP: 6.5 PH (ref 5–7)
RBC #/AREA URNS AUTO: <1 /HPF (ref 0–2)
SP GR UR STRIP: 1.01 (ref 1–1.03)
SQUAMOUS #/AREA URNS AUTO: 1 /HPF (ref 0–1)
URN SPEC COLLECT METH UR: ABNORMAL
UROBILINOGEN UR STRIP-MCNC: NORMAL MG/DL (ref 0–2)
WBC #/AREA URNS AUTO: 1 /HPF (ref 0–2)

## 2017-06-26 PROCEDURE — 25000128 H RX IP 250 OP 636: Performed by: EMERGENCY MEDICINE

## 2017-06-26 PROCEDURE — 99284 EMERGENCY DEPT VISIT MOD MDM: CPT | Mod: 25 | Performed by: EMERGENCY MEDICINE

## 2017-06-26 PROCEDURE — 96361 HYDRATE IV INFUSION ADD-ON: CPT | Performed by: EMERGENCY MEDICINE

## 2017-06-26 PROCEDURE — 74020 XR ABDOMEN 2 VW: CPT

## 2017-06-26 PROCEDURE — 96375 TX/PRO/DX INJ NEW DRUG ADDON: CPT | Performed by: EMERGENCY MEDICINE

## 2017-06-26 PROCEDURE — 96374 THER/PROPH/DIAG INJ IV PUSH: CPT | Performed by: EMERGENCY MEDICINE

## 2017-06-26 PROCEDURE — 81025 URINE PREGNANCY TEST: CPT | Performed by: EMERGENCY MEDICINE

## 2017-06-26 PROCEDURE — 81001 URINALYSIS AUTO W/SCOPE: CPT | Performed by: EMERGENCY MEDICINE

## 2017-06-26 PROCEDURE — 99284 EMERGENCY DEPT VISIT MOD MDM: CPT | Mod: Z6 | Performed by: EMERGENCY MEDICINE

## 2017-06-26 RX ORDER — ONDANSETRON 2 MG/ML
4 INJECTION INTRAMUSCULAR; INTRAVENOUS ONCE
Status: COMPLETED | OUTPATIENT
Start: 2017-06-26 | End: 2017-06-26

## 2017-06-26 RX ORDER — KETOROLAC TROMETHAMINE 15 MG/ML
15 INJECTION, SOLUTION INTRAMUSCULAR; INTRAVENOUS ONCE
Status: COMPLETED | OUTPATIENT
Start: 2017-06-26 | End: 2017-06-26

## 2017-06-26 RX ADMIN — SODIUM CHLORIDE 1000 ML: 9 INJECTION, SOLUTION INTRAVENOUS at 07:12

## 2017-06-26 RX ADMIN — ONDANSETRON 4 MG: 2 INJECTION INTRAMUSCULAR; INTRAVENOUS at 07:12

## 2017-06-26 RX ADMIN — KETOROLAC TROMETHAMINE 15 MG: 15 INJECTION, SOLUTION INTRAMUSCULAR; INTRAVENOUS at 07:12

## 2017-06-26 ASSESSMENT — ENCOUNTER SYMPTOMS
DIARRHEA: 0
FEVER: 0
BLOOD IN STOOL: 0
APPETITE CHANGE: 0
CONSTIPATION: 0
RECTAL PAIN: 0
ABDOMINAL PAIN: 1
NAUSEA: 1
CHILLS: 0
ANAL BLEEDING: 0

## 2017-06-26 NOTE — DISCHARGE INSTRUCTIONS

## 2017-06-26 NOTE — TELEPHONE ENCOUNTER
"  Reason for Disposition    [1] MILD-MODERATE pain AND [2] constant AND [3] present > 2 hours    Additional Information    Negative: Shock suspected (e.g., cold/pale/clammy skin, too weak to stand, low BP, rapid pulse)    Negative: Difficult to awaken or acting confused  (e.g., disoriented, slurred speech)    Negative: Passed out (i.e., lost consciousness, collapsed and was not responding)    Negative: Sounds like a life-threatening emergency to the triager    Negative: [1] Vomiting AND [2] contains red blood or black (\"coffee ground\") material  (Exception: few red streaks in vomit that only happened once)    Negative: [1] SEVERE pain (e.g., excruciating) AND [2] present > 1 hour    Negative: [1] SEVERE pain AND [2] age > 60    Negative: Blood in bowel movements   (Exception: blood on surface of BM with constipation)    Negative: Black or tarry bowel movements  (Exception: chronic-unchanged  black-grey bowel movements AND is taking iron pills or Pepto-bismol)    Protocols used: ABDOMINAL PAIN - FEMALE-ADULT-    "

## 2017-06-26 NOTE — PROGRESS NOTES
Patient in ED today for some abdominal pain. AXR performed and shows the spring is no longer present. Called the patient and informed her of the good news.    Aquilino Leary  GI fellow

## 2017-06-26 NOTE — ED NOTES
Patient BIBA c/o abdominal pain and nausea. Patient was recently seen in this ED after swallowing a pen spring and a straightened staple with intention for self harm. Patient has been following-up with GI, hoping that the items will pass with induction of diarrhea. Patient has been taking medications and the staple has passed but believes the spring is still there. She is supposed to have an xray this morning, but woke up in severe abdominal pain.

## 2017-06-26 NOTE — ED PROVIDER NOTES
"  History     Chief Complaint   Patient presents with     Abdominal Pain     Nausea     HPI  Nevin Alvarado is a 26 year old female who returns to the ED this morning for evaluation of persistent abdominal pain.  She is known to have a retained metallic spring she self ingested.  She has been followed by GI and had a tentative plan for a colonoscopy this past week as FB appeared to be lodged at the IC valve.  However, repeat plain films demonstrated this had moved to the hepatic flexure and c-scope was canceled in favor of miralax.  She returns to the ED at this time with persistent pain; this is rated at moderate to severe on the right side of her abdomen; non radiating.  She denies new ingestion or trauma.  No melena or hematochezia.    I have reviewed the Medications, Allergies, Past Medical and Surgical History, and Social History in the Epic system.    Review of Systems   Constitutional: Negative for appetite change, chills and fever.   Gastrointestinal: Positive for abdominal pain and nausea. Negative for anal bleeding, blood in stool, constipation, diarrhea and rectal pain.   All other systems reviewed and are negative.      Physical Exam   BP: 117/62  Pulse: 85  Temp: 97.7  F (36.5  C)  Resp: 20  Height: 157.5 cm (5' 2\")  Weight: 101.2 kg (223 lb)  SpO2: 98 %  Physical Exam   Constitutional: She is oriented to person, place, and time. She appears well-developed and well-nourished. She appears distressed.   HENT:   Head: Normocephalic and atraumatic.   Mouth/Throat: Oropharynx is clear and moist.   Eyes: Pupils are equal, round, and reactive to light.   Cardiovascular: Normal rate, regular rhythm and normal heart sounds.    Pulmonary/Chest: Effort normal and breath sounds normal. No respiratory distress. She has no wheezes. She has no rales.   Abdominal: Soft. She exhibits no distension. There is tenderness. There is no rebound and no guarding.   Musculoskeletal: She exhibits no edema or deformity. "   Neurological: She is alert and oriented to person, place, and time. No cranial nerve deficit.   Skin: Skin is warm and dry. No rash noted.   Psychiatric: Her mood appears anxious.       ED Course     ED Course     Procedures           Critical Care time:  none         Labs Ordered and Resulted from Time of ED Arrival Up to the Time of Departure from the ED - No data to display         Assessments & Plan (with Medical Decision Making)     26 yr old female with retained colonic metallic spring returning to the ED for evaluation of ongoing pain.  Upon arrival she is noted to be alert, afebrile, and hemodynamically stable.  She appears uncomfortable but is non toxic.  She has tenderness with palpation of the right abdomen but with no involuntary guarding or distention.  I would have a low suspicion for perforation or abscess at this time.  She has recently had multiple CT imaging and this has been observed well by plain films.  Plan will be to obtain xray and if this has no longer advanced, rediscuss with GI for consideration of placing the patient back on c-scope schedule.      Xray reveals no sign of retained FB by my read, confirmed with Radiology.  Otherwise, I did offer repeat lab studies and U/A which the patient has declined.  I would actually agree with low suspicion for acute intraabdominal pathology which would be aided by screening lab studies.  I did discuss indications for emergent return versus follow up with her PCP as needed.      I have reviewed the nursing notes.    I have reviewed the findings, diagnosis, plan and need for follow up with the patient.    New Prescriptions    No medications on file       Final diagnoses:   Abdominal pain, right upper quadrant       6/26/2017   [facility], [place], EMERGENCY DEPARTMENT     David Gonzalez MD  06/26/17 [id number]

## 2017-06-26 NOTE — ED AVS SNAPSHOT
[facility], Emergency Department    [address], [place] [address]    Phone:  [phone]                                       Nevin Alvarado   MRN: [id number]    Department:  [facility], Emergency Department   Date of Visit:  6/26/2017           Patient Information     Date Of Birth          1990        Your diagnoses for this visit were:     Abdominal pain, right upper quadrant        You were seen by David Gonzalez MD.      Follow-up Information     Follow up with Sandra Ramos MD.    Specialty:  Internal Medicine    Contact information:    [facility]  [address] [facility] [address]  [phone]          Follow up with [facility], Emergency Department.    Specialty:  EMERGENCY MEDICINE    Why:  As needed, If symptoms worsen    Contact information:    [address] [place] [phone] [phone]    Additional information:    The [facility] is located on the corner of [facility] and [facility] on the [facility]. It is easily accessible from virtually any point in the [facility] area, via [employer] and [employer].        Discharge Instructions         Abdominal Pain    Abdominal pain is pain in the stomach or belly area. Everyone has this pain from time to time. In many cases it goes away on its own. But abdominal pain can sometimes be due to a serious problem, such as appendicitis. So it s important to know when to seek help.  Causes of abdominal pain  There are many possible causes of abdominal pain. Common causes in adults include:    Constipation, diarrhea, or gas    Stomach acid flowing back up into the esophagus (acid reflux or heartburn)    Severe acid reflux, called GERD (gastroesophageal reflux disease)    A sore in the lining of the stomach or small intestine (peptic ulcer)    Inflammation of the gallbladder, liver, or pancreas    Gallstones or kidney stones    Appendicitis     Intestinal  blockage     An internal organ pushing through a muscle or other tissue (hernia)    Urinary tract infections    In women, menstrual cramps, fibroids, or endometriosis    Inflammation or infection of the intestines  Diagnosing the cause of abdominal pain  Your healthcare provider will do a physical exam help find the cause of your pain. If needed, tests will be ordered. Belly pain has many possible causes. So it can be hard to find the reason for your pain. Giving details about your pain can help. Tell your provider where and when you feel the pain, and what makes it better or worse. Also let your provider know if you have other symptoms such as:    Fever    Tiredness    Upset stomach (nausea)    Vomiting    Changes in bathroom habits  Treating abdominal pain  Some causes of pain need emergency medical treatment right away. These include appendicitis or a bowel blockage. Other problems can be treated with rest, fluids, or medicines. Your healthcare provider can give you specific instructions for treatment or self-care based on what is causing your pain.  If you have vomiting or diarrhea, sip water or other clear fluids. When you are ready to eat solid foods again, start with small amounts of easy-to-digest, low-fat foods. These include apple sauce, toast, or crackers.   When to seek medical care  Call 911 or go to the hospital right away if you:    Can t pass stool and are vomiting    Are vomiting blood or have bloody diarrhea or black, tarry diarrhea    Have chest, neck, or shoulder pain    Feel like you might pass out    Have pain in your shoulder blades with nausea    Have sudden, severe belly pain    Have new, severe pain unlike any you have felt before    Have a belly that is rigid, hard, and tender to touch  Call your healthcare provider if you have:    Pain for more than 5 days    Bloating for more than 2 days    Diarrhea for more than 5 days    A fever of 100.4 F (38.0 C) or higher, or as directed by your  provider    Pain that gets worse    Weight loss for no reason    Continued lack of appetite    Blood in your stool  How to prevent abdominal pain  Here are some tips to help prevent abdominal pain:    Eat smaller amounts of food at one time.    Avoid greasy, fried, or other high-fat foods.    Avoid foods that give you gas.    Exercise regularly.    Drink plenty of fluids.  To help prevent GERD symptoms:    Quit smoking.    Reduce alcohol and certain foods that increase stomach acid.    Avoid aspirin and over-the-counter pain and fever medicines (NSAIDS or nonsteroidal anti-inflammatory drugs), if possible    Lose extra weight.    Finish eating at least 2 hours before you go to bed or lie down.    Raise the head of your bed.  Date Last Reviewed: 7/1/2016 2000-2017 [employer]. [address] [place] [address], [place] [address]. All rights reserved. This information is not intended as a substitute for professional medical care. Always follow your healthcare professional's instructions.          Future Appointments        Provider Department Dept Phone Center    6/26/2017 8:30 AM [facility] ROOM 1 [facility] [phone] [facility]      24 Hour Appointment Hotline       To make an appointment at any [facility], call [phone] ([phone]). If you don't have a family doctor or clinic, we will help you find one. [facility] are conveniently located to serve the needs of you and your family.             Review of your medicines      Our records show that you are taking the medicines listed below. If these are incorrect, please call your family doctor or clinic.        Dose / Directions Last dose taken    acetaminophen 500 MG tablet   Commonly known as:  TYLENOL   Dose:  500-1000 mg   Quantity:  90 tablet        Take 1-2 tablets (500-1,000 mg) by mouth every 8 hours as needed for mild pain   Refills:  3        albuterol 108 (90 BASE) MCG/ACT Inhaler   Commonly  known as:  albuterol   Dose:  2 puff   Quantity:  1 Inhaler        Inhale 2 puffs into the lungs every 4 hours as needed for shortness of breath / dyspnea   Refills:  0        augmented betamethasone dipropionate 0.05 % ointment   Commonly known as:  DIPROLENE-AF   Quantity:  45 g        Apply to AA BID x 2-3 weeks then PRN only   Refills:  3        ibuprofen 600 MG tablet   Commonly known as:  ADVIL/MOTRIN   Dose:  600 mg   Quantity:  30 tablet        Take 1 tablet (600 mg) by mouth every 6 hours as needed for moderate pain   Refills:  0        LAMICTAL 100 MG tablet   Dose:  100 mg   Generic drug:  lamoTRIgine        Take 1 tablet (100 mg) by mouth daily   Refills:  0        levonorgestrel 20 MCG/24HR IUD   Commonly known as:  MIRENA   Dose:  1 each        1 each (20 mcg) by Intrauterine route once for 1 dose   Refills:  0        ondansetron 4 MG ODT tab   Commonly known as:  ZOFRAN ODT   Dose:  4-8 mg   Quantity:  20 tablet        Take 1-2 tablets (4-8 mg) by mouth every 8 hours as needed for nausea   Refills:  1        polyethylene glycol powder   Commonly known as:  MIRALAX/GLYCOLAX   Dose:  17 g   Quantity:  510 g        Take 17 g by mouth 2 times daily   Refills:  3        PRISTIQ PO   Dose:  100 mg        Take 100 mg by mouth daily   Refills:  0        senna 8.6 MG tablet   Commonly known as:  SENOKOT   Dose:  1 tablet   Quantity:  60 tablet        Take 1 tablet by mouth 2 times daily as needed for constipation   Refills:  0        SUMATRIPTAN SUCCINATE PO   Dose:  50 mg        Take 50 mg by mouth once as needed for migraine Reported on 5/19/2017   Refills:  0        TOPAMAX 50 MG tablet   Dose:  50 mg   Generic drug:  topiramate        Take 1 tablet (50 mg) by mouth 2 times daily   Refills:  0        traMADol 50 MG tablet   Commonly known as:  ULTRAM   Dose:  5 mg        Take 5 mg by mouth as needed   Refills:  0        VITAMIN D3 PO   Dose:  5000 Units        Take 5,000 Units by mouth daily   Refills:  0                 Procedures and tests performed during your visit     Abdomen XR, 2 vw, flat and upright    HCG qualitative urine    UA with Microscopic      Orders Needing Specimen Collection     None      Pending Results     No orders found from 6/24/2017 to 6/27/2017.            Pending Culture Results     No orders found from 6/24/2017 to 6/27/2017.            Pending Results Instructions     If you had any lab results that were not finalized at the time of your Discharge, you can call the ED Lab Result RN at [phone]. You will be contacted by this team for any positive Lab results or changes in treatment. The nurses are available 7 days a week from 10A to 6:30P.  You can leave a message 24 hours per day and they will return your call.        Thank you for choosing [facility]       Thank you for choosing [facility] for your care. Our goal is always to provide you with excellent care. Hearing back from our patients is one way we can continue to improve our services. Please take a few minutes to complete the written survey that you may receive in the mail after you visit with us. Thank you!        [employer] Information     [employer] gives you secure access to your electronic health record. If you see a primary care provider, you can also send messages to your care team and make appointments. If you have questions, please call your primary care clinic.  If you do not have a primary care provider, please call [phone] and they will assist you.        Care EveryWhere ID     This is your Care EveryWhere ID. This could be used by other organizations to access your [facility] medical records  [id number]        Equal Access to Services     ZENON DIAMOND AH: Hadii zaire Collado, waaxda luqadaha, qaybta kaalmada jona, mic persaud. So [facility] [phone].    ATENCIÓN: Si habla español, tiene a singh disposición servicios gratuitos de asistencia lingüística. Llame al [phone].    We  comply with applicable federal civil rights laws and [place] laws. We do not discriminate on the basis of race, color, national origin, age, disability sex, sexual orientation or gender identity.            After Visit Summary       This is your record. Keep this with you and show to your community pharmacist(s) and doctor(s) at your next visit.

## 2017-06-26 NOTE — ED AVS SNAPSHOT
[facility] [place], Emergency Department    [address] [place] [address]    Phone:  [phone]                                       Nevin Alvarado   MRN: [id number]    Department:  [facility], Emergency Department   Date of Visit:  6/26/2017           After Visit Summary Signature Page     I have received my discharge instructions, and my questions have been answered. I have discussed any challenges I see with this plan with the nurse or doctor.    ..........................................................................................................................................  Patient/Patient Representative Signature      ..........................................................................................................................................  Patient Representative Print Name and Relationship to Patient    ..................................................               ................................................  Date                                            Time    ..........................................................................................................................................  Reviewed by Signature/Title    ...................................................              ..............................................  Date                                                            Time

## 2017-07-01 ENCOUNTER — HEALTH MAINTENANCE LETTER (OUTPATIENT)
Age: 27
End: 2017-07-01

## 2017-07-03 ENCOUNTER — TELEPHONE (OUTPATIENT)
Dept: NURSING | Facility: CLINIC | Age: 27
End: 2017-07-03

## 2017-07-04 ENCOUNTER — MYC MEDICAL ADVICE (OUTPATIENT)
Dept: INTERNAL MEDICINE | Facility: CLINIC | Age: 27
End: 2017-07-04

## 2017-07-04 DIAGNOSIS — B00.1 RECURRENT COLD SORES: Primary | ICD-10-CM

## 2017-07-05 RX ORDER — DOCOSANOL 100 MG/G
CREAM TOPICAL
Qty: 2 G | Refills: 3 | Status: SHIPPED | OUTPATIENT
Start: 2017-07-05 | End: 2017-07-05

## 2017-07-05 RX ORDER — DOCOSANOL 100 MG/G
CREAM TOPICAL
Qty: 2 G | Refills: 3 | Status: SHIPPED | OUTPATIENT
Start: 2017-07-05 | End: 2017-08-21

## 2017-07-05 NOTE — TELEPHONE ENCOUNTER
Sent patient MyChart message regarding Dr. Ramos's recommendations from separate TE regarding this.

## 2017-07-07 ENCOUNTER — NURSE TRIAGE (OUTPATIENT)
Dept: NURSING | Facility: CLINIC | Age: 27
End: 2017-07-07

## 2017-07-07 NOTE — TELEPHONE ENCOUNTER
Reason for Disposition    MODERATE vaginal bleeding (i.e., soaking 1 pad or tampon per hour and present > 6 hours)    Additional Information    Negative: Shock suspected (e.g., cold/pale/clammy skin, too weak to stand, low BP, rapid pulse)    Negative: Sounds like a life-threatening emergency to the triager    Negative: [1] Abdominal pain AND [2] pregnant < 20 weeks    Negative: [1] Vaginal bleeding AND [2] pregnant < 20 weeks    Negative: Vaginal discharge is main symptom    Negative: [1] SEVERE abdominal pain (e.g., excruciating) AND [2] present > 1 hour    Negative: SEVERE vaginal bleeding (i.e., soaking 2 pads or tampons per hour and present 2 or more hours)    Negative: Patient sounds very sick or weak to the triager    Protocols used: CONTRACEPTION - IUD SYMPTOMS AND QUESTIONS-ADULT-

## 2017-07-08 ENCOUNTER — HOSPITAL ENCOUNTER (EMERGENCY)
Facility: CLINIC | Age: 27
Discharge: HOME | End: 2017-07-08
Attending: EMERGENCY MEDICINE | Admitting: EMERGENCY MEDICINE
Payer: MEDICARE

## 2017-07-08 ENCOUNTER — NURSE TRIAGE (OUTPATIENT)
Dept: NURSING | Facility: CLINIC | Age: 27
End: 2017-07-08

## 2017-07-08 VITALS
HEART RATE: 83 BPM | OXYGEN SATURATION: 97 % | DIASTOLIC BLOOD PRESSURE: 91 MMHG | SYSTOLIC BLOOD PRESSURE: 146 MMHG | RESPIRATION RATE: 18 BRPM | TEMPERATURE: 97.5 F

## 2017-07-08 DIAGNOSIS — T19.2XXA FOREIGN BODY IN VAGINA, INITIAL ENCOUNTER: ICD-10-CM

## 2017-07-08 PROCEDURE — 99284 EMERGENCY DEPT VISIT MOD MDM: CPT

## 2017-07-08 ASSESSMENT — ENCOUNTER SYMPTOMS: FEVER: 0

## 2017-07-08 NOTE — ED AVS SNAPSHOT
[facility] Emergency Department    [address] [place] [address]    Phone:  [phone]    Fax:  [phone]                                       Nevin Alvarado   MRN: [id number]    Department:  [facility] Emergency Department   Date of Visit:  7/8/2017           Patient Information     Date Of Birth          1990        Your diagnoses for this visit were:     Foreign body in vagina, initial encounter        You were seen by Asher Craft MD.      Follow-up Information     Follow up with Sandra Ramos MD. Call in 3 days.    Specialty:  Internal Medicine    Contact information:    [facility]  [address]  [facility] [address]  [phone]          Discharge Instructions         Vaginal Foreign Body, Removed (Adult)    Any object placed inside the vagina is called a vaginal foreign body. This includes tampons, birth control devices, and sex toys. In some cases, objects not designed for the vagina may be placed inside.  If an object is left inside the vagina too long or becomes stuck, it can cause symptoms over time. It can also lead to infection and damage nearby tissues.  Symptoms can include unusual or foul-smelling discharge. Bleeding, redness, swelling, or rash may also occur. Some women may feel pain or pressure in or around the vagina.  Treatment involves removing the object. Once the object is removed, symptoms should go away. If the object caused an infection, antibiotics may be given.  Home care    If you re prescribed any medicines, be sure to take them as directed.    Don t douche unless advised to by your provider.    Wait until all symptoms have gone away before having sex.    Check with your provider before using tampons. If it s OK, remember to remove each tampon you use after 6 to 8 hours.  Follow-up care  Follow up with your healthcare provider, or as advised.  When to seek medical advice  Call your healthcare  provider right away if any of these occur:    Your symptoms don t improve or worsen.    You develop pain in the belly.    You have burning or pain during urination.    You have a fever of 100.4 F (38 C) or higher, or as directed by your provider.    You feel weak, dizzy, or faint.  Date Last Reviewed: 7/30/2015 2000-2017 [employer]. [address] [place] [address], [place] [address]. All rights reserved. This information is not intended as a substitute for professional medical care. Always follow your healthcare professional's instructions.          24 Hour Appointment Hotline       To make an appointment at any [facility], call [phone] ([phone]). If you don't have a family doctor or clinic, we will help you find one. [facility] clinics are conveniently located to serve the needs of you and your family.             Review of your medicines      Our records show that you are taking the medicines listed below. If these are incorrect, please call your family doctor or clinic.        Dose / Directions Last dose taken    acetaminophen 500 MG tablet   Commonly known as:  TYLENOL   Dose:  500-1000 mg   Quantity:  90 tablet        Take 1-2 tablets (500-1,000 mg) by mouth every 8 hours as needed for mild pain   Refills:  3        albuterol 108 (90 BASE) MCG/ACT Inhaler   Commonly known as:  albuterol   Dose:  2 puff   Quantity:  1 Inhaler        Inhale 2 puffs into the lungs every 4 hours as needed for shortness of breath / dyspnea   Refills:  0        augmented betamethasone dipropionate 0.05 % ointment   Commonly known as:  DIPROLENE-AF   Quantity:  45 g        Apply to AA BID x 2-3 weeks then PRN only   Refills:  3        docosanol 10 % Crea cream   Commonly known as:  ABREVA   Quantity:  2 g        Apply topically 5 times daily As needed   Refills:  3        ibuprofen 600 MG tablet   Commonly known as:  ADVIL/MOTRIN   Dose:  600 mg   Quantity:  30 tablet        Take 1 tablet (600 mg) by mouth  every 6 hours as needed for moderate pain   Refills:  0        LAMICTAL 100 MG tablet   Dose:  100 mg   Generic drug:  lamoTRIgine        Take 1 tablet (100 mg) by mouth daily   Refills:  0        levonorgestrel 20 MCG/24HR IUD   Commonly known as:  MIRENA   Dose:  1 each        1 each (20 mcg) by Intrauterine route once for 1 dose   Refills:  0        ondansetron 4 MG ODT tab   Commonly known as:  ZOFRAN ODT   Dose:  4-8 mg   Quantity:  20 tablet        Take 1-2 tablets (4-8 mg) by mouth every 8 hours as needed for nausea   Refills:  1        polyethylene glycol powder   Commonly known as:  MIRALAX/GLYCOLAX   Dose:  17 g   Quantity:  510 g        Take 17 g by mouth 2 times daily   Refills:  3        PRISTIQ PO   Dose:  100 mg        Take 100 mg by mouth daily   Refills:  0        senna 8.6 MG tablet   Commonly known as:  SENOKOT   Dose:  1 tablet   Quantity:  60 tablet        Take 1 tablet by mouth 2 times daily as needed for constipation   Refills:  0        SUMATRIPTAN SUCCINATE PO   Dose:  50 mg        Take 50 mg by mouth once as needed for migraine Reported on 5/19/2017   Refills:  0        TOPAMAX 50 MG tablet   Dose:  50 mg   Generic drug:  topiramate        Take 1 tablet (50 mg) by mouth 2 times daily   Refills:  0        traMADol 50 MG tablet   Commonly known as:  ULTRAM   Dose:  5 mg        Take 5 mg by mouth as needed   Refills:  0        VITAMIN D3 PO   Dose:  5000 Units        Take 5,000 Units by mouth daily   Refills:  0                Orders Needing Specimen Collection     None      Pending Results     No orders found from 7/6/2017 to 7/9/2017.            Pending Culture Results     No orders found from 7/6/2017 to 7/9/2017.            Pending Results Instructions     If you had any lab results that were not finalized at the time of your Discharge, you can call the ED Lab Result RN at [phone]. You will be contacted by this team for any positive Lab results or changes in treatment. The nurses are  available 7 days a week from 10A to 6:30P.  You can leave a message 24 hours per day and they will return your call.        Test Results From Your Hospital Stay               Clinical Quality Measure: Blood Pressure Screening     Your blood pressure was checked while you were in the emergency department today. The last reading we obtained was  BP: (!) 150/119 . Please read the guidelines below about what these numbers mean and what you should do about them.  If your systolic blood pressure (the top number) is less than 120 and your diastolic blood pressure (the bottom number) is less than 80, then your blood pressure is normal. There is nothing more that you need to do about it.  If your systolic blood pressure (the top number) is 120-139 or your diastolic blood pressure (the bottom number) is 80-89, your blood pressure may be higher than it should be. You should have your blood pressure rechecked within a year by a primary care provider.  If your systolic blood pressure (the top number) is 140 or greater or your diastolic blood pressure (the bottom number) is 90 or greater, you may have high blood pressure. High blood pressure is treatable, but if left untreated over time it can put you at risk for heart attack, stroke, or kidney failure. You should have your blood pressure rechecked by a primary care provider within the next 4 weeks.  If your provider in the emergency department today gave you specific instructions to follow-up with your doctor or provider even sooner than that, you should follow that instruction and not wait for up to 4 weeks for your follow-up visit.        Thank you for choosing [facility]       Thank you for choosing [facility] for your care. Our goal is always to provide you with excellent care. Hearing back from our patients is one way we can continue to improve our services. Please take a few minutes to complete the written survey that you may receive in the mail after you visit with us. Thank  you!        [employer] Information     [employer] gives you secure access to your electronic health record. If you see a primary care provider, you can also send messages to your care team and make appointments. If you have questions, please call your primary care clinic.  If you do not have a primary care provider, please call [phone] and they will assist you.        Care EveryWhere ID     This is your Care EveryWhere ID. This could be used by other organizations to access your [facility] medical records  [id number]        Equal Access to Services     ZENON DIAOMND : Gabriel andino Sogwendolyn, waaxgregory luqadaha, qaybjeni kaalmagregory tenorio, mic persaud. So [facility] [phone].    ATENCIÓN: Si habla español, tiene a singh disposición servicios gratuitos de asistencia lingüística. Llame al [phone].    We comply with applicable federal civil rights laws and [place] laws. We do not discriminate on the basis of race, color, national origin, age, disability sex, sexual orientation or gender identity.            After Visit Summary       This is your record. Keep this with you and show to your community pharmacist(s) and doctor(s) at your next visit.

## 2017-07-08 NOTE — ED AVS SNAPSHOT
[facility] Emergency Department    [address]    [facility] [address]    Phone:  [phone]    Fax:  [phone]                                       Nevin Alvarado   MRN: [id number]    Department:  [facility] Emergency Department   Date of Visit:  7/8/2017           After Visit Summary Signature Page     I have received my discharge instructions, and my questions have been answered. I have discussed any challenges I see with this plan with the nurse or doctor.    ..........................................................................................................................................  Patient/Patient Representative Signature      ..........................................................................................................................................  Patient Representative Print Name and Relationship to Patient    ..................................................               ................................................  Date                                            Time    ..........................................................................................................................................  Reviewed by Signature/Title    ...................................................              ..............................................  Date                                                            Time

## 2017-07-09 NOTE — ED PROVIDER NOTES
History     Chief Complaint:  Vaginal Foreign Body      HPI   Nevin Alvarado is a 26 year old female who presents with a vaginal foreign body. The patient states that she has had her menstrual period for the last week. She notes that the period has been light, however, when she woke up this morning it was heavier. The patient placed a tampon earlier today, however she forgot that she placed the tampon and this evening attempted to insert another one. She noticed pain when trying to insert the second tampon and realized that she already had another tampon in. The patient tried to remove the first tampon, however, the string broke and she has been unable to remove it. She denies any fevers, or other complaints.      Allergies:  Augmentin-Swelling  Blood group specific substance  Hydrocodone-Nausea and vomiting  Influenza Vaccines  Oseltamivir-Hives  Vicodin-Nausea and Vomiting  Cefazolin-Rash  Cephalosporins-Rash     Medications:    Abreva  Topamax  Zofran  Lamictal   Tylenol  Diprolene  Mirena  Albuterol   Senna  Sumatriptan   Pristiq    Past Medical History:    ADD  Anorexia nervosa with bulimia  Anxiety   Borderline personality disorder  Depression   History of self-harm   History of swallowed foreign body   Migraine   PTSD  Morbid obesity     Past Surgical History:    Right knee surgery  Laparoscopic ablation endometriosis   Lymph nodes removed from neck  Mammoplasty reduction   Release carpal tunnel, bilateral    Family History:    Cerebrovascular Disease-Father    Social History:  Marital Status: Single  Presents to the ED alone  Tobacco Use: Never Used  Alcohol Use: No  PCP: Sandra Ramos      Review of Systems   Constitutional: Negative for fever.   Genitourinary:        Positive for vaginal foreign body and vaginal discomfort.    All other systems reviewed and are negative.    Physical Exam   First Vitals:  BP: (!) 150/119  Pulse: 83  Temp: 97.5  F (36.4  C)  Resp: 18  SpO2: 97 %    Physical  Exam  General: Well appearing   :  Retained tampon within vaginal canal  Skin: No superficial erythema around vagina     Emergency Department Course     Procedures:  Foreign body Removal: Speculum used to visualize tampon and remove with forceps    Emergency Department Course:  Nursing notes and vitals reviewed.  I performed an exam of the patient as documented above.  Findings and plan explained to the patient. Patient discharged home with instructions regarding supportive care, medications, and reasons to return. The importance of close follow-up was reviewed.     Impression & Plan    Medical Decision Making:  Nevin Alvarado is a 26 year old female who presents for evaluation of a vaginal foreign body. The patient accidentally placed two tampons today and when trying to remove the first tampon, the string broke. Since that time, the patient has been unable to remove the tampon prompting her visit to the ED.  This was successfully removed, see above procedure note. No signs of complications of the foreign body.  Patient is more comfortable after removal.  Will have them follow up with primary care as needed.     Diagnosis:    ICD-10-CM    1. Foreign body in vagina, initial encounter T19.2XXA        Disposition:  discharged to home        I, Hollie Ayala, am serving as a scribe on 7/8/2017 at 10:09 PM to personally document services performed by Dr. Craft based on my observations and the provider's statements to me.   7/8/2017   [facility] EMERGENCY DEPARTMENT       Asher Craft MD  07/09/17 0044

## 2017-07-09 NOTE — TELEPHONE ENCOUNTER
Reason for Disposition    MODERATE vaginal bleeding (i.e., soaking 1 pad or tampon per hour and present > 6 hours)     Has tampon stuck way up for hours and string broke.  Heavy bleeding.    Additional Information    Negative: Shock suspected (e.g., cold/pale/clammy skin, too weak to stand, low BP, rapid pulse)    Negative: Sounds like a life-threatening emergency to the triager    Protocols used: CONTRACEPTION - IUD SYMPTOMS AND QUESTIONS-ADULT-

## 2017-07-09 NOTE — DISCHARGE INSTRUCTIONS
Vaginal Foreign Body, Removed (Adult)    Any object placed inside the vagina is called a vaginal foreign body. This includes tampons, birth control devices, and sex toys. In some cases, objects not designed for the vagina may be placed inside.  If an object is left inside the vagina too long or becomes stuck, it can cause symptoms over time. It can also lead to infection and damage nearby tissues.  Symptoms can include unusual or foul-smelling discharge. Bleeding, redness, swelling, or rash may also occur. Some women may feel pain or pressure in or around the vagina.  Treatment involves removing the object. Once the object is removed, symptoms should go away. If the object caused an infection, antibiotics may be given.  Home care    If you re prescribed any medicines, be sure to take them as directed.    Don t douche unless advised to by your provider.    Wait until all symptoms have gone away before having sex.    Check with your provider before using tampons. If it s OK, remember to remove each tampon you use after 6 to 8 hours.  Follow-up care  Follow up with your healthcare provider, or as advised.  When to seek medical advice  Call your healthcare provider right away if any of these occur:    Your symptoms don t improve or worsen.    You develop pain in the belly.    You have burning or pain during urination.    You have a fever of 100.4 F (38 C) or higher, or as directed by your provider.    You feel weak, dizzy, or faint.  Date Last Reviewed: 7/30/2015 2000-2017 [employer]. [address] [place] [address], [place] [address]. All rights reserved. This information is not intended as a substitute for professional medical care. Always follow your healthcare professional's instructions.

## 2017-07-09 NOTE — ED NOTES
Pt presents to ED c/o a foreign body in her vagina. Pt states she forgot she had a tampon in earlier this evening and inserted another one. The string broke off of the first tampon and she has been unable to remove it. Reports some vaginal discomfort.

## 2017-07-11 ENCOUNTER — APPOINTMENT (OUTPATIENT)
Dept: ULTRASOUND IMAGING | Facility: CLINIC | Age: 27
End: 2017-07-11
Attending: EMERGENCY MEDICINE
Payer: MEDICARE

## 2017-07-11 ENCOUNTER — HOSPITAL ENCOUNTER (EMERGENCY)
Facility: CLINIC | Age: 27
Discharge: HOME | End: 2017-07-11
Attending: EMERGENCY MEDICINE | Admitting: EMERGENCY MEDICINE
Payer: MEDICARE

## 2017-07-11 ENCOUNTER — APPOINTMENT (OUTPATIENT)
Dept: CT IMAGING | Facility: CLINIC | Age: 27
End: 2017-07-11
Attending: EMERGENCY MEDICINE
Payer: MEDICARE

## 2017-07-11 ENCOUNTER — NURSE TRIAGE (OUTPATIENT)
Dept: NURSING | Facility: CLINIC | Age: 27
End: 2017-07-11

## 2017-07-11 VITALS
HEART RATE: 90 BPM | TEMPERATURE: 98.2 F | DIASTOLIC BLOOD PRESSURE: 81 MMHG | RESPIRATION RATE: 16 BRPM | SYSTOLIC BLOOD PRESSURE: 130 MMHG | OXYGEN SATURATION: 96 %

## 2017-07-11 DIAGNOSIS — N83.209 OVARIAN CYST: ICD-10-CM

## 2017-07-11 DIAGNOSIS — B37.31 CANDIDIASIS OF VULVA AND VAGINA: ICD-10-CM

## 2017-07-11 DIAGNOSIS — N83.202 CYST OF LEFT OVARY: ICD-10-CM

## 2017-07-11 DIAGNOSIS — Z97.5 PRESENCE OF INTRAUTERINE CONTRACEPTIVE DEVICE: ICD-10-CM

## 2017-07-11 LAB
ALBUMIN SERPL-MCNC: 3.6 G/DL (ref 3.4–5)
ALBUMIN UR-MCNC: NEGATIVE MG/DL
ALP SERPL-CCNC: 75 U/L (ref 40–150)
ALT SERPL W P-5'-P-CCNC: 19 U/L (ref 0–50)
ANION GAP SERPL CALCULATED.3IONS-SCNC: 9 MMOL/L (ref 3–14)
APPEARANCE UR: CLEAR
AST SERPL W P-5'-P-CCNC: 12 U/L (ref 0–45)
BASOPHILS # BLD AUTO: 0 10E9/L (ref 0–0.2)
BASOPHILS NFR BLD AUTO: 0.1 %
BILIRUB SERPL-MCNC: 0.1 MG/DL (ref 0.2–1.3)
BILIRUB UR QL STRIP: NEGATIVE
BUN SERPL-MCNC: 10 MG/DL (ref 7–30)
CALCIUM SERPL-MCNC: 8.8 MG/DL (ref 8.5–10.1)
CHLORIDE SERPL-SCNC: 114 MMOL/L (ref 94–109)
CO2 SERPL-SCNC: 21 MMOL/L (ref 20–32)
COLOR UR AUTO: NORMAL
CREAT SERPL-MCNC: 0.53 MG/DL (ref 0.52–1.04)
DIFFERENTIAL METHOD BLD: NORMAL
EOSINOPHIL # BLD AUTO: 0.1 10E9/L (ref 0–0.7)
EOSINOPHIL NFR BLD AUTO: 0.9 %
ERYTHROCYTE [DISTWIDTH] IN BLOOD BY AUTOMATED COUNT: 13.7 % (ref 10–15)
ERYTHROCYTE [SEDIMENTATION RATE] IN BLOOD BY WESTERGREN METHOD: 26 MM/H (ref 0–20)
GFR SERPL CREATININE-BSD FRML MDRD: ABNORMAL ML/MIN/1.7M2
GLUCOSE SERPL-MCNC: 94 MG/DL (ref 70–99)
GLUCOSE UR STRIP-MCNC: NEGATIVE MG/DL
HCG UR QL: NEGATIVE
HCT VFR BLD AUTO: 36.2 % (ref 35–47)
HGB BLD-MCNC: 12 G/DL (ref 11.7–15.7)
HGB UR QL STRIP: NEGATIVE
IMM GRANULOCYTES # BLD: 0 10E9/L (ref 0–0.4)
IMM GRANULOCYTES NFR BLD: 0.2 %
KETONES UR STRIP-MCNC: NEGATIVE MG/DL
LEUKOCYTE ESTERASE UR QL STRIP: NEGATIVE
LIPASE SERPL-CCNC: 107 U/L (ref 73–393)
LYMPHOCYTES # BLD AUTO: 1.2 10E9/L (ref 0.8–5.3)
LYMPHOCYTES NFR BLD AUTO: 12.5 %
MCH RBC QN AUTO: 29.5 PG (ref 26.5–33)
MCHC RBC AUTO-ENTMCNC: 33.1 G/DL (ref 31.5–36.5)
MCV RBC AUTO: 89 FL (ref 78–100)
MICRO REPORT STATUS: ABNORMAL
MONOCYTES # BLD AUTO: 0.3 10E9/L (ref 0–1.3)
MONOCYTES NFR BLD AUTO: 3.4 %
NEUTROPHILS # BLD AUTO: 8.3 10E9/L (ref 1.6–8.3)
NEUTROPHILS NFR BLD AUTO: 82.9 %
NITRATE UR QL: NEGATIVE
NRBC # BLD AUTO: 0 10*3/UL
NRBC BLD AUTO-RTO: 0 /100
PH UR STRIP: 6.5 PH (ref 5–7)
PLATELET # BLD AUTO: 221 10E9/L (ref 150–450)
POTASSIUM SERPL-SCNC: 3.8 MMOL/L (ref 3.4–5.3)
PROT SERPL-MCNC: 7.1 G/DL (ref 6.8–8.8)
RBC # BLD AUTO: 4.07 10E12/L (ref 3.8–5.2)
SODIUM SERPL-SCNC: 144 MMOL/L (ref 133–144)
SP GR UR STRIP: 1.01 (ref 1–1.03)
SPECIMEN SOURCE: ABNORMAL
URN SPEC COLLECT METH UR: NORMAL
UROBILINOGEN UR STRIP-MCNC: NORMAL MG/DL (ref 0–2)
WBC # BLD AUTO: 10 10E9/L (ref 4–11)
WET PREP SPEC: ABNORMAL

## 2017-07-11 PROCEDURE — 85025 COMPLETE CBC W/AUTO DIFF WBC: CPT | Performed by: EMERGENCY MEDICINE

## 2017-07-11 PROCEDURE — 81003 URINALYSIS AUTO W/O SCOPE: CPT | Performed by: EMERGENCY MEDICINE

## 2017-07-11 PROCEDURE — 87491 CHLMYD TRACH DNA AMP PROBE: CPT | Performed by: EMERGENCY MEDICINE

## 2017-07-11 PROCEDURE — 96361 HYDRATE IV INFUSION ADD-ON: CPT | Performed by: EMERGENCY MEDICINE

## 2017-07-11 PROCEDURE — 81025 URINE PREGNANCY TEST: CPT | Performed by: EMERGENCY MEDICINE

## 2017-07-11 PROCEDURE — 25000128 H RX IP 250 OP 636: Performed by: EMERGENCY MEDICINE

## 2017-07-11 PROCEDURE — 85652 RBC SED RATE AUTOMATED: CPT | Performed by: EMERGENCY MEDICINE

## 2017-07-11 PROCEDURE — 87210 SMEAR WET MOUNT SALINE/INK: CPT | Performed by: EMERGENCY MEDICINE

## 2017-07-11 PROCEDURE — A9270 NON-COVERED ITEM OR SERVICE: HCPCS | Mod: GY | Performed by: EMERGENCY MEDICINE

## 2017-07-11 PROCEDURE — 80053 COMPREHEN METABOLIC PANEL: CPT | Performed by: EMERGENCY MEDICINE

## 2017-07-11 PROCEDURE — 99284 EMERGENCY DEPT VISIT MOD MDM: CPT | Mod: Z6 | Performed by: EMERGENCY MEDICINE

## 2017-07-11 PROCEDURE — 99285 EMERGENCY DEPT VISIT HI MDM: CPT | Mod: 25 | Performed by: EMERGENCY MEDICINE

## 2017-07-11 PROCEDURE — 96365 THER/PROPH/DIAG IV INF INIT: CPT | Mod: 59 | Performed by: EMERGENCY MEDICINE

## 2017-07-11 PROCEDURE — 76376 3D RENDER W/INTRP POSTPROCES: CPT

## 2017-07-11 PROCEDURE — 74177 CT ABD & PELVIS W/CONTRAST: CPT

## 2017-07-11 PROCEDURE — 87591 N.GONORRHOEAE DNA AMP PROB: CPT | Performed by: EMERGENCY MEDICINE

## 2017-07-11 PROCEDURE — S0077 INJECTION, CLINDAMYCIN PHOSP: HCPCS | Performed by: EMERGENCY MEDICINE

## 2017-07-11 PROCEDURE — 83690 ASSAY OF LIPASE: CPT | Performed by: EMERGENCY MEDICINE

## 2017-07-11 PROCEDURE — 25000132 ZZH RX MED GY IP 250 OP 250 PS 637: Mod: GY | Performed by: EMERGENCY MEDICINE

## 2017-07-11 PROCEDURE — 96376 TX/PRO/DX INJ SAME DRUG ADON: CPT | Performed by: EMERGENCY MEDICINE

## 2017-07-11 PROCEDURE — 25000125 ZZHC RX 250: Performed by: EMERGENCY MEDICINE

## 2017-07-11 PROCEDURE — 96367 TX/PROPH/DG ADDL SEQ IV INF: CPT | Performed by: EMERGENCY MEDICINE

## 2017-07-11 PROCEDURE — 96375 TX/PRO/DX INJ NEW DRUG ADDON: CPT | Performed by: EMERGENCY MEDICINE

## 2017-07-11 PROCEDURE — 25000128 H RX IP 250 OP 636

## 2017-07-11 RX ORDER — ONDANSETRON 2 MG/ML
4 INJECTION INTRAMUSCULAR; INTRAVENOUS ONCE
Status: COMPLETED | OUTPATIENT
Start: 2017-07-11 | End: 2017-07-11

## 2017-07-11 RX ORDER — DOXYCYCLINE 100 MG/1
100 CAPSULE ORAL 2 TIMES DAILY
Qty: 20 CAPSULE | Refills: 0 | Status: SHIPPED | OUTPATIENT
Start: 2017-07-11 | End: 2017-07-21

## 2017-07-11 RX ORDER — FLUCONAZOLE 150 MG/1
150 TABLET ORAL ONCE
Status: COMPLETED | OUTPATIENT
Start: 2017-07-11 | End: 2017-07-11

## 2017-07-11 RX ORDER — IOPAMIDOL 755 MG/ML
100 INJECTION, SOLUTION INTRAVASCULAR ONCE
Status: COMPLETED | OUTPATIENT
Start: 2017-07-11 | End: 2017-07-11

## 2017-07-11 RX ORDER — FLUCONAZOLE 150 MG/1
TABLET ORAL
Qty: 1 TABLET | Refills: 0 | Status: SHIPPED | OUTPATIENT
Start: 2017-07-21 | End: 2017-07-21

## 2017-07-11 RX ORDER — OXYCODONE HYDROCHLORIDE 5 MG/1
5 TABLET ORAL EVERY 6 HOURS PRN
Qty: 20 TABLET | Refills: 0 | Status: SHIPPED | OUTPATIENT
Start: 2017-07-11 | End: 2017-07-31

## 2017-07-11 RX ORDER — DOXYCYCLINE 100 MG/10ML
100 INJECTION, POWDER, LYOPHILIZED, FOR SOLUTION INTRAVENOUS ONCE
Status: COMPLETED | OUTPATIENT
Start: 2017-07-11 | End: 2017-07-11

## 2017-07-11 RX ORDER — CLINDAMYCIN HCL 300 MG
300 CAPSULE ORAL 4 TIMES DAILY
Qty: 40 CAPSULE | Refills: 0 | Status: SHIPPED | OUTPATIENT
Start: 2017-07-11 | End: 2017-07-21

## 2017-07-11 RX ORDER — SODIUM CHLORIDE 9 MG/ML
1000 INJECTION, SOLUTION INTRAVENOUS CONTINUOUS
Status: DISCONTINUED | OUTPATIENT
Start: 2017-07-11 | End: 2017-07-11 | Stop reason: HOSPADM

## 2017-07-11 RX ORDER — HYDROMORPHONE HYDROCHLORIDE 1 MG/ML
0.5 INJECTION, SOLUTION INTRAMUSCULAR; INTRAVENOUS; SUBCUTANEOUS ONCE
Status: COMPLETED | OUTPATIENT
Start: 2017-07-11 | End: 2017-07-11

## 2017-07-11 RX ORDER — CLINDAMYCIN PHOSPHATE 900 MG/50ML
900 INJECTION, SOLUTION INTRAVENOUS ONCE
Status: COMPLETED | OUTPATIENT
Start: 2017-07-11 | End: 2017-07-11

## 2017-07-11 RX ORDER — SODIUM CHLORIDE 9 MG/ML
INJECTION, SOLUTION INTRAVENOUS
Status: COMPLETED
Start: 2017-07-11 | End: 2017-07-11

## 2017-07-11 RX ORDER — HYDROMORPHONE HYDROCHLORIDE 1 MG/ML
0.5 INJECTION, SOLUTION INTRAMUSCULAR; INTRAVENOUS; SUBCUTANEOUS
Status: COMPLETED | OUTPATIENT
Start: 2017-07-11 | End: 2017-07-11

## 2017-07-11 RX ADMIN — SODIUM CHLORIDE 1000 ML: 9 INJECTION, SOLUTION INTRAVENOUS at 12:51

## 2017-07-11 RX ADMIN — HYDROMORPHONE HYDROCHLORIDE 0.5 MG: 1 INJECTION, SOLUTION INTRAMUSCULAR; INTRAVENOUS; SUBCUTANEOUS at 15:23

## 2017-07-11 RX ADMIN — SODIUM CHLORIDE: 900 INJECTION, SOLUTION INTRAVENOUS at 16:47

## 2017-07-11 RX ADMIN — IOPAMIDOL 98 ML: 755 INJECTION, SOLUTION INTRAVENOUS at 17:07

## 2017-07-11 RX ADMIN — DOXYCYCLINE 100 MG: 100 INJECTION, POWDER, LYOPHILIZED, FOR SOLUTION INTRAVENOUS at 17:14

## 2017-07-11 RX ADMIN — CLINDAMYCIN PHOSPHATE 900 MG: 18 INJECTION, SOLUTION INTRAVENOUS at 15:59

## 2017-07-11 RX ADMIN — HYDROMORPHONE HYDROCHLORIDE 0.5 MG: 1 INJECTION, SOLUTION INTRAMUSCULAR; INTRAVENOUS; SUBCUTANEOUS at 13:17

## 2017-07-11 RX ADMIN — SODIUM CHLORIDE 65 ML: 9 INJECTION, SOLUTION INTRAVENOUS at 17:12

## 2017-07-11 RX ADMIN — ONDANSETRON 4 MG: 2 INJECTION INTRAMUSCULAR; INTRAVENOUS at 13:01

## 2017-07-11 RX ADMIN — SODIUM CHLORIDE 1000 ML: 9 INJECTION, SOLUTION INTRAVENOUS at 15:47

## 2017-07-11 RX ADMIN — FLUCONAZOLE 150 MG: 150 TABLET ORAL at 17:48

## 2017-07-11 RX ADMIN — HYDROMORPHONE HYDROCHLORIDE 0.5 MG: 1 INJECTION, SOLUTION INTRAMUSCULAR; INTRAVENOUS; SUBCUTANEOUS at 16:47

## 2017-07-11 RX ADMIN — HYDROMORPHONE HYDROCHLORIDE 0.5 MG: 1 INJECTION, SOLUTION INTRAMUSCULAR; INTRAVENOUS; SUBCUTANEOUS at 12:54

## 2017-07-11 RX ADMIN — ONDANSETRON 4 MG: 2 INJECTION INTRAMUSCULAR; INTRAVENOUS at 15:54

## 2017-07-11 NOTE — LETTER
[facility], [place], EMERGENCY DEPARTMENT  [address]  [place] [address]  [phone]    2017    Nevin Alvarado  [address]   [facility] [id number] [phone] (home) NONE (work)    : 1990      To Whom it may concern:    Nevin Alvarado was seen in our Emergency Department today, 2017.  I expect her condition to improve over the next few days.  She may return to work/school on 17.    Sincerely,        Bruce Pryor MD

## 2017-07-11 NOTE — ED PROVIDER NOTES
History     Chief Complaint   Patient presents with     Back Pain     left low back pain since saturday. seen at [facility] er saturday night for foreign body (extra tampon). pt wonders if the pulled on her iud string while trying to get stuck tampon out     HPI  Nevin Alvarado is a 26 year old female who states that she got her period 3 days ago and states that her period was fairly heavy so that she put 2 tampons in before she went to work that day.  Patient states that when she got home from work that day, she took one of the tampons out and forgot the other one was in there.  Patient states that she went to try to get the 2nd one out but the string broke.  Patient states that she went to [facility] Emergency Department where she was seen, evaluated and with pelvic exam had the other tampon removed. The note from [facility] reveals that the patient felt better after removal but the patient states that she developed back pain when it was being removed.  Patient states that she thinks that her IUD may have been partially dislodged.  Patient states that her back pain has continued over the past 3 days and it is located in her left lumbar back.  Patient states that it hurts every time she urinates but doesn t necessarily have dysuria.  Patient denies any fevers, any vomiting or any vaginal discharge, but complains of lower abdominal pain and back pain.    This part of the medical record was transcribed by Marcos Askew, Medical Scribe, from a dictation done by Bruce Pryor MD.       I have reviewed the Medications, Allergies, Past Medical and Surgical History, and Social History in the [facility] system.    PAST MEDICAL HISTORY:   Past Medical History:   Diagnosis Date     ADD (attention deficit disorder)      Anorexia nervosa with bulimia     history of; on Topamax     Anxiety      Borderline personality disorder      Depression      H/O self-harm      H/O swallowed foreign body     Recurrent issue     Lives in  independent group home     due to debilitating mental illness     Migraine without aura     no known triggers; on Topamax bid and Imitrex PRN     Morbid obesity (H)      PTSD (post-traumatic stress disorder)      Rectal foreign body     Recurrent issue       PAST SURGICAL HISTORY:   Past Surgical History:   Procedure Laterality Date     ESOPHAGOSCOPY, GASTROSCOPY, DUODENOSCOPY (EGD), COMBINED N/A 3/9/2017    Procedure: COMBINED ESOPHAGOSCOPY, GASTROSCOPY, DUODENOSCOPY (EGD), REMOVE FOREIGN BODY;  Surgeon: Avis Guzmán MD;  Location: [facility] OR     ESOPHAGOSCOPY, GASTROSCOPY, DUODENOSCOPY (EGD), COMBINED N/A 4/20/2017    Procedure: COMBINED ESOPHAGOSCOPY, GASTROSCOPY, DUODENOSCOPY (EGD), REMOVE FOREIGN BODY;  EGD removal Foregin body;  Surgeon: Lokesh Paula MD;  Location: [facility] OR     ESOPHAGOSCOPY, GASTROSCOPY, DUODENOSCOPY (EGD), COMBINED N/A 6/12/2017    Procedure: COMBINED ESOPHAGOSCOPY, GASTROSCOPY, DUODENOSCOPY (EGD);  COMBINED ESOPHAGOSCOPY, GASTROSCOPY, DUODENOSCOPY (EGD) [[id number]]attempted removal of foreign body;  Surgeon: Pamela Perez MD;  Location: [facility] OR     ESOPHAGOSCOPY, GASTROSCOPY, DUODENOSCOPY (EGD), COMBINED N/A 6/9/2017    Procedure: COMBINED ESOPHAGOSCOPY, GASTROSCOPY, DUODENOSCOPY (EGD), REMOVE FOREIGN BODY;  Esophagoscopy, Gastroscopy, Duodenoscopy, Removal of Foreign Body;  Surgeon: Dejon Marsh MD;  Location: [facility] OR     EXAM UNDER ANESTHESIA ANUS N/A 1/10/2017    Procedure: EXAM UNDER ANESTHESIA ANUS;  Surgeon: Annmarie Haynes MD;  Location: [facility] OR     HC REMOVE FECAL IMPACTION OR FB W ANESTHESIA N/A 12/18/2016    Procedure: REMOVE FECAL IMPACTION/FOREIGN BODY UNDER ANESTHESIA;  Surgeon: Soham Cano MD;  Location: [facility] OR     KNEE SURGERY Right     removed a small tissue mass from knee     LAPAROSCOPIC ABLATION ENDOMETRIOSIS       LAPAROTOMY EXPLORATORY N/A 1/10/2017    Procedure: LAPAROTOMY EXPLORATORY;  Surgeon: Annmarie Haynes  MD;  Location: [facility] OR     lymph nodes removed from neck; benign  age 7     MAMMOPLASTY REDUCTION Bilateral      RELEASE CARPAL TUNNEL Bilateral      SIGMOIDOSCOPY FLEXIBLE N/A 1/10/2017    Procedure: SIGMOIDOSCOPY FLEXIBLE;  Surgeon: Annmarie Haynes MD;  Location: [facility] OR       FAMILY HISTORY:   Family History   Problem Relation Age of Onset     Type 2 Diabetes Maternal Grandmother      Type 2 Diabetes Paternal Grandmother      Breast Cancer Paternal Grandmother      CEREBROVASCULAR DISEASE Father 53     Myocardial Infarction No family hx of      Coronary Artery Disease Early Onset No family hx of      Ovarian Cancer No family hx of      Colon Cancer No family hx of        SOCIAL HISTORY:   Social History   Substance Use Topics     Smoking status: Never Smoker     Smokeless tobacco: Never Used     Alcohol use No     Previous Medications    ACETAMINOPHEN (TYLENOL) 500 MG TABLET    Take 1-2 tablets (500-1,000 mg) by mouth every 8 hours as needed for mild pain    ALBUTEROL (ALBUTEROL) 108 (90 BASE) MCG/ACT INHALER    Inhale 2 puffs into the lungs every 4 hours as needed for shortness of breath / dyspnea    AUGMENTED BETAMETHASONE DIPROPIONATE (DIPROLENE-AF) 0.05 % OINTMENT    Apply to AA BID x 2-3 weeks then PRN only    CHOLECALCIFEROL (VITAMIN D3 PO)    Take 5,000 Units by mouth daily     DESVENLAFAXINE SUCCINATE (PRISTIQ PO)    Take 100 mg by mouth daily    DOCOSANOL (ABREVA) 10 % CREA CREAM    Apply topically 5 times daily As needed    IBUPROFEN (ADVIL/MOTRIN) 600 MG TABLET    Take 1 tablet (600 mg) by mouth every 6 hours as needed for moderate pain    LAMOTRIGINE (LAMICTAL) 100 MG TABLET    Take 1 tablet (100 mg) by mouth daily    LEVONORGESTREL (MIRENA) 20 MCG/24HR IUD    1 each (20 mcg) by Intrauterine route once for 1 dose    ONDANSETRON (ZOFRAN ODT) 4 MG ODT TAB    Take 1-2 tablets (4-8 mg) by mouth every 8 hours as needed for nausea    SUMATRIPTAN SUCCINATE PO    Take 50 mg by mouth once as needed for  migraine Reported on 5/19/2017    TOPIRAMATE (TOPAMAX) 50 MG TABLET    Take 1 tablet (50 mg) by mouth 2 times daily        Allergies   Allergen Reactions     Augmentin Swelling     Blood-Group Specific Substance Other (See Comments)     Patient has an anti-Cw and non-specific antibodies. Blood product orders may be delayed. Draw one red top and two purple top tubes for all type/screen/crossmatch orders.     Hydrocodone Nausea and Vomiting     vomiting for days     Influenza Vaccines Other (See Comments)     Oseltamivir Hives     med stopped, PN: med stopped     Vicodin [Hydrocodone-Acetaminophen] Nausea and Vomiting     Cefazolin Rash     Cephalosporins Rash         Review of Systems   All other systems reviewed and are negative.      Physical Exam   BP: 103/60  Pulse: 81  Temp: 98.1  F (36.7  C)  Resp: 20  SpO2: 93 %  Physical Exam   Constitutional: She is oriented to person, place, and time. She appears distressed.   Alert and conversant and tearful   HENT:   Head: Atraumatic.   Eyes: EOM are normal. Pupils are equal, round, and reactive to light.   Neck: Neck supple.   Cardiovascular: Normal heart sounds.    Pulmonary/Chest: Breath sounds normal.   Abdominal: Soft. There is tenderness (minimal suprapubic tenderness to palpation). There is no rebound and no guarding.   No CVA tenderness   Genitourinary:   Genitourinary Comments: Pelvic exam revealed some whitish discharge.  Cultures were taken and sent to the lab.  IUD string visualized and appears normal.  No true cervical motion tenderness however there is some whitish discharge from the cervix itself.   Musculoskeletal: She exhibits no edema or tenderness.   Neurological: She is alert and oriented to person, place, and time.   Grossly intact and symmetric   Skin: Skin is warm.   Psychiatric:   Consistent with her diagnosis of borderline personality disorder       ED Course     ED Course     Procedures          Results for orders placed or performed during the  hospital encounter of 07/11/17   US Pelvis Cmplt w Transvag & Doppler LmtPel Duplex Limited    Narrative    PELVIC ULTRASOUND 7/11/2017 3:02 PM    HISTORY: Pelvic pain rule out torsion. IUD since July 20, 2015.  Spotting    COMPARISON; 6/17/2017 CT abdomen and pelvis    TECHNIQUE: Transabdominal and endovaginal technique to better  visualize endometrial stripe and adnexa.    FINDINGS: Uterus is normal in appearance 9.3 x 4.7 x 4.0 cm in size.  IUD identified in the endometrium with normal-appearing 0.7 cm  endometrial stripe. No fibroids.    Simple appearing left ovarian cyst 4.0 x 3.8 x 3.7 cm. Doppler  waveform analysis demonstrates arterial and venous flow in the left  ovary at the periphery of this cyst.    Normal-appearing right ovary 2.4 x 2.2 x 1.9 cm with arterial and  venous flow identified in the right ovary.    No adnexal mass or free fluid.    IMPRESSION;  1. Normal-appearing uterus and right ovary with IUD in good position.  2. Simple appearing left ovarian cyst.  3. Arterial and venous flow identified to both ovaries.    ROSY ANTONIO MD   CT Abdomen Pelvis w Contrast    Narrative    CT ABDOMEN AND PELVIS WITH CONTRAST 7/11/2017 5:19 PM    HISTORY: Left-sided abdominal pain. Prior endometrial mass surgical  removal.    TECHNIQUE: Helical axial scans from dome of liver through pubic  symphysis with 98 mL Isovue 370 IV contrast. Radiation dose for this  scan was reduced using automated exposure control, adjustment of the  mA and/or kV according to patient size, or iterative reconstruction  technique.    COMPARISON: 6/17/2017.    FINDINGS: There is mild intrahepatic and extrahepatic biliary ductal  prominence for age with the common duct in the head of the pancreas  measuring 0.7 cm. No intraluminal filling defects are seen in the  common duct. No gallbladder wall thickening or para cholecystic  fluid/inflammatory changes. Ultrasound exam could be obtained to  evaluate for gallstones and bile duct  stones. The liver is otherwise  unremarkable. The spleen, pancreas, bilateral adrenal glands and  kidneys bilaterally show no abnormality. The bowel and mesentery in  the upper abdomen are unremarkable. The previously seen spring-shaped  structure passing through the ileocecal valve is no longer identified.    Scans through the pelvis show no acute appearing abnormalities. The  appendix is identified and appears normal. There is a new left ovarian  cyst measuring up to 4.5 cm. No free fluid. Intrauterine device is  again seen in the expected location. There is a mild S-shaped  curvature of the thoracolumbar spine.      Impression    IMPRESSION:  1. Interval development of mild intrahepatic and extrahepatic biliary  ductal prominence for age. Ultrasound exam is recommended for further  evaluation.  2. Previously seen spring-shaped structure passing through the  ileocecal valve is no longer identified.  3. Interval development of a 4.5 cm left ovarian cyst.     UA reflex to Microscopic   Result Value Ref Range    Color Urine Light Yellow     Appearance Urine Clear     Glucose Urine Negative NEG mg/dL    Bilirubin Urine Negative NEG    Ketones Urine Negative NEG mg/dL    Specific Gravity Urine 1.012 1.003 - 1.035    Blood Urine Negative NEG    pH Urine 6.5 5.0 - 7.0 pH    Protein Albumin Urine Negative NEG mg/dL    Urobilinogen mg/dL Normal 0.0 - 2.0 mg/dL    Nitrite Urine Negative NEG    Leukocyte Esterase Urine Negative NEG    Source Midstream Urine    HCG qualitative urine   Result Value Ref Range    HCG Qual Urine Negative NEG   CBC with platelets differential   Result Value Ref Range    WBC 10.0 4.0 - 11.0 10e9/L    RBC Count 4.07 3.8 - 5.2 10e12/L    Hemoglobin 12.0 11.7 - 15.7 g/dL    Hematocrit 36.2 35.0 - 47.0 %    MCV 89 78 - 100 fl    MCH 29.5 26.5 - 33.0 pg    MCHC 33.1 31.5 - 36.5 g/dL    RDW 13.7 10.0 - 15.0 %    Platelet Count 221 150 - 450 10e9/L    Diff Method Automated Method     % Neutrophils 82.9 %     % Lymphocytes 12.5 %    % Monocytes 3.4 %    % Eosinophils 0.9 %    % Basophils 0.1 %    % Immature Granulocytes 0.2 %    Nucleated RBCs 0 0 /100    Absolute Neutrophil 8.3 1.6 - 8.3 10e9/L    Absolute Lymphocytes 1.2 0.8 - 5.3 10e9/L    Absolute Monocytes 0.3 0.0 - 1.3 10e9/L    Absolute Eosinophils 0.1 0.0 - 0.7 10e9/L    Absolute Basophils 0.0 0.0 - 0.2 10e9/L    Abs Immature Granulocytes 0.0 0 - 0.4 10e9/L    Absolute Nucleated RBC 0.0    Comprehensive metabolic panel   Result Value Ref Range    Sodium 144 133 - 144 mmol/L    Potassium 3.8 3.4 - 5.3 mmol/L    Chloride 114 (H) 94 - 109 mmol/L    Carbon Dioxide 21 20 - 32 mmol/L    Anion Gap 9 3 - 14 mmol/L    Glucose 94 70 - 99 mg/dL    Urea Nitrogen 10 7 - 30 mg/dL    Creatinine 0.53 0.52 - 1.04 mg/dL    GFR Estimate >90  Non  GFR Calc   >60 mL/min/1.7m2    GFR Estimate If Black >90   GFR Calc   >60 mL/min/1.7m2    Calcium 8.8 8.5 - 10.1 mg/dL    Bilirubin Total 0.1 (L) 0.2 - 1.3 mg/dL    Albumin 3.6 3.4 - 5.0 g/dL    Protein Total 7.1 6.8 - 8.8 g/dL    Alkaline Phosphatase 75 40 - 150 U/L    ALT 19 0 - 50 U/L    AST 12 0 - 45 U/L   Lipase   Result Value Ref Range    Lipase 107 73 - 393 U/L   Erythrocyte sedimentation rate auto   Result Value Ref Range    Sed Rate 26 (H) 0 - 20 mm/h   Wet prep   Result Value Ref Range    Specimen Description Vagina     Wet Prep (A)      Rare PMNs seen  No Trichomonas seen  No clue cells seen  Rare Yeast seen  Rare Pseudohyphae      Micro Report Status FINAL 07/11/2017        GC and chlamydia cultures pending.    Assessments & Plan (with Medical Decision Making)     I have reviewed the nursing notes.    Patient's pain is most likely coming from her left ovarian cyst and there may be some supratentorial component to the pain as well.  Salpingitis cannot be ruled out completely and therefore the patient received IV antibiotics here in the ER and will be sent home on antibiotics in addition to some  pain pills for her ovarian cyst.  CT and ultrasound revealed no abnormality of the IUD, revealed no ovarian torsion and no evidence of nephrolithiasis, diverticulitis or other GI pathology.    Medications   sodium chloride (PF) 0.9% PF flush 3 mL ( Intracatheter Given 7/11/17 1647)   sodium chloride (PF) 0.9% PF flush 3 mL (3 mLs Intracatheter Given 7/11/17 1530)   0.9% sodium chloride BOLUS (0 mLs Intravenous Stopped 7/11/17 1546)     Followed by   0.9% sodium chloride infusion (0 mLs Intravenous Stopped 7/11/17 1715)   ondansetron (ZOFRAN) injection 4 mg (4 mg Intravenous Given 7/11/17 1301)   HYDROmorphone (PF) (DILAUDID) injection 0.5 mg (0.5 mg Intravenous Given 7/11/17 1523)   iohexol (OMNIPAQUE) solution 25 mL (25 mLs Oral Given 7/11/17 1617)   clindamycin (CLEOCIN) infusion 900 mg (0 mg Intravenous Stopped 7/11/17 1716)   doxycycline (VIBRAMYCIN) 100 mg vial to attach to  mL bag (0 mg Intravenous Stopped 7/11/17 1750)   ondansetron (ZOFRAN) injection 4 mg (4 mg Intravenous Given 7/11/17 1554)   iopamidol (ISOVUE-370) solution 100 mL (98 mLs Intravenous Given 7/11/17 1707)   sodium chloride 0.9 % for CT scan flush dose 50 mL (65 mLs As instructed Given 7/11/17 1712)   HYDROmorphone (PF) (DILAUDID) injection 0.5 mg (0.5 mg Intravenous Given 7/11/17 1647)   fluconazole (DIFLUCAN) tablet 150 mg (150 mg Oral Given 7/11/17 1748)       I have reviewed the findings, diagnosis, plan and need for follow up with the patient.    New Prescriptions    CLINDAMYCIN (CLEOCIN) 300 MG CAPSULE    Take 1 capsule (300 mg) by mouth 4 times daily for 10 days    DOXYCYCLINE (VIBRAMYCIN) 100 MG CAPSULE    Take 1 capsule (100 mg) by mouth 2 times daily for 10 days    FLUCONAZOLE (DIFLUCAN) 150 MG TABLET    Take one tablet in ten days (when done with antibiotics)    OXYCODONE (ROXICODONE) 5 MG IR TABLET    Take 1 tablet (5 mg) by mouth every 6 hours as needed for pain    patient has own supply of Zofran at her living  facility      Final diagnoses:   Ovarian cyst - left   Candidiasis of vulva and vagina     Take your Zofran at home as directed for nausea as needed    Check your culture results in 2 days.    Please make an appointment to follow up with Your Primary Care Provider in 10-14 days for recheck.    Bruce Pryor MD    7/11/2017   [facility], [place], EMERGENCY DEPARTMENT     Bruce Pryor MD  07/11/17 1800

## 2017-07-11 NOTE — ED AVS SNAPSHOT
[facility], Emergency Department    [address]    [facility] [address]    Phone:  [phone]    Fax:  [phone]                                       Nevin Alvarado   MRN: [id number]    Department:  [facility], Emergency Department   Date of Visit:  7/11/2017           After Visit Summary Signature Page     I have received my discharge instructions, and my questions have been answered. I have discussed any challenges I see with this plan with the nurse or doctor.    ..........................................................................................................................................  Patient/Patient Representative Signature      ..........................................................................................................................................  Patient Representative Print Name and Relationship to Patient    ..................................................               ................................................  Date                                            Time    ..........................................................................................................................................  Reviewed by Signature/Title    ...................................................              ..............................................  Date                                                            Time

## 2017-07-11 NOTE — TELEPHONE ENCOUNTER
Left lower back pain, described as sharp. Started about 5 hours ago (2200). Pain is constant. Denies urinary symptoms. No known injury. Rates pain 5/10.   Additional Information    Negative: Unable to walk    Negative: Weakness of a leg or foot (e.g., unable to bear weight, dragging foot)    Negative: [1] Pain or burning with urination AND [2] flank pain (i.e., in side, below ribs and above hip)    Negative: [1] Fever > 100.5 F (38.1 C) AND [2] flank pain (i.e., in side, below ribs and above hip)    Negative: Numbness in a leg or foot (i.e., loss of sensation)    Back pain  (all triage questions negative)    Protocols used: BACK PAIN-ADULT-AH

## 2017-07-11 NOTE — ED AVS SNAPSHOT
[facility], Emergency Department    [address]    [place] [address]    Phone:  [phone]    Fax:  [phone]                                       Nevin Alvarado   MRN: [id number]    Department:  [facility], Emergency Department   Date of Visit:  7/11/2017           Patient Information     Date Of Birth          1990        Your diagnoses for this visit were:     Ovarian cyst left    Candidiasis of vulva and vagina        You were seen by Bruce Pryor MD.        Discharge Instructions       Take your Zofran at home as directed for nausea as needed    Check your culture results in 2 days.    Please make an appointment to follow up with Your Primary Care Provider in 10-14 days for recheck.    Discharge References/Attachments     OVARIAN CYST (ENGLISH)    VAGINAL INFECTION: YEAST (CANDIDIASIS) (ENGLISH)      24 Hour Appointment Hotline       To make an appointment at any [facility], call [phone] ([phone]). If you don't have a family doctor or clinic, we will help you find one. [facility] are conveniently located to serve the needs of you and your family.             Review of your medicines      START taking        Dose / Directions Last dose taken    clindamycin 300 MG capsule   Commonly known as:  CLEOCIN   Dose:  300 mg   Quantity:  40 capsule        Take 1 capsule (300 mg) by mouth 4 times daily for 10 days   Refills:  0        doxycycline 100 MG capsule   Commonly known as:  VIBRAMYCIN   Dose:  100 mg   Quantity:  20 capsule        Take 1 capsule (100 mg) by mouth 2 times daily for 10 days   Refills:  0        fluconazole 150 MG tablet   Commonly known as:  DIFLUCAN   Quantity:  1 tablet   Start taking on:  7/21/2017        Take one tablet in ten days (when done with antibiotics)   Refills:  0        oxyCODONE 5 MG IR tablet   Commonly known as:  ROXICODONE   Dose:  5 mg   Quantity:  20 tablet        Take 1 tablet (5 mg) by mouth every 6  hours as needed for pain   Refills:  0          Our records show that you are taking the medicines listed below. If these are incorrect, please call your family doctor or clinic.        Dose / Directions Last dose taken    acetaminophen 500 MG tablet   Commonly known as:  TYLENOL   Dose:  500-1000 mg   Quantity:  90 tablet        Take 1-2 tablets (500-1,000 mg) by mouth every 8 hours as needed for mild pain   Refills:  3        albuterol 108 (90 BASE) MCG/ACT Inhaler   Commonly known as:  albuterol   Dose:  2 puff   Quantity:  1 Inhaler        Inhale 2 puffs into the lungs every 4 hours as needed for shortness of breath / dyspnea   Refills:  0        augmented betamethasone dipropionate 0.05 % ointment   Commonly known as:  DIPROLENE-AF   Quantity:  45 g        Apply to AA BID x 2-3 weeks then PRN only   Refills:  3        docosanol 10 % Crea cream   Commonly known as:  ABREVA   Quantity:  2 g        Apply topically 5 times daily As needed   Refills:  3        ibuprofen 600 MG tablet   Commonly known as:  ADVIL/MOTRIN   Dose:  600 mg   Quantity:  30 tablet        Take 1 tablet (600 mg) by mouth every 6 hours as needed for moderate pain   Refills:  0        LAMICTAL 100 MG tablet   Dose:  100 mg   Generic drug:  lamoTRIgine        Take 1 tablet (100 mg) by mouth daily   Refills:  0        levonorgestrel 20 MCG/24HR IUD   Commonly known as:  MIRENA   Dose:  1 each        1 each (20 mcg) by Intrauterine route once for 1 dose   Refills:  0        ondansetron 4 MG ODT tab   Commonly known as:  ZOFRAN ODT   Dose:  4-8 mg   Quantity:  20 tablet        Take 1-2 tablets (4-8 mg) by mouth every 8 hours as needed for nausea   Refills:  1        PRISTIQ PO   Dose:  100 mg        Take 100 mg by mouth daily   Refills:  0        SUMATRIPTAN SUCCINATE PO   Dose:  50 mg        Take 50 mg by mouth once as needed for migraine Reported on 5/19/2017   Refills:  0        TOPAMAX 50 MG tablet   Dose:  50 mg   Generic drug:  topiramate         Take 1 tablet (50 mg) by mouth 2 times daily   Refills:  0        VITAMIN D3 PO   Dose:  5000 Units        Take 5,000 Units by mouth daily   Refills:  0                Prescriptions were sent or printed at these locations (4 Prescriptions)                   Other Prescriptions                Printed at Department/Unit printer (4 of 4)         clindamycin (CLEOCIN) 300 MG capsule               doxycycline (VIBRAMYCIN) 100 MG capsule               oxyCODONE (ROXICODONE) 5 MG IR tablet               fluconazole (DIFLUCAN) 150 MG tablet                Procedures and tests performed during your visit     CBC with platelets differential    CT Abdomen Pelvis w Contrast    Chlamydia trachomatis PCR    Comprehensive metabolic panel    Erythrocyte sedimentation rate auto    HCG qualitative urine    Lipase    Neisseria gonorrhoea PCR    Peripheral IV: Standard    Prep for procedure - pelvic exam    UA reflex to Microscopic    US Pelvis Cmplt w Transvag & Doppler LmtPel Duplex Limited    Wet prep      Orders Needing Specimen Collection     None      Pending Results     Date and Time Order Name Status Description    7/11/2017 1535 CT Abdomen Pelvis w Contrast Preliminary     7/11/2017 1238 Neisseria gonorrhoea PCR In process     7/11/2017 1238 Chlamydia trachomatis PCR In process             Pending Culture Results     Date and Time Order Name Status Description    7/11/2017 1238 Neisseria gonorrhoea PCR In process     7/11/2017 1238 Chlamydia trachomatis PCR In process             Pending Results Instructions     If you had any lab results that were not finalized at the time of your Discharge, you can call the ED Lab Result RN at [phone]. You will be contacted by this team for any positive Lab results or changes in treatment. The nurses are available 7 days a week from 10A to 6:30P.  You can leave a message 24 hours per day and they will return your call.        Thank you for choosing [facility]       Thank you for  choosing [facility] for your care. Our goal is always to provide you with excellent care. Hearing back from our patients is one way we can continue to improve our services. Please take a few minutes to complete the written survey that you may receive in the mail after you visit with us. Thank you!        [facility] Information     [employer] gives you secure access to your electronic health record. If you see a primary care provider, you can also send messages to your care team and make appointments. If you have questions, please call your primary care clinic.  If you do not have a primary care provider, please call [phone] and they will assist you.        Care EveryWhere ID     This is your Care EveryWhere ID. This could be used by other organizations to access your [facility] medical records  [id number]        Equal Access to Services     ZENON DIAMOND : Gabriel Collado, erick singh, taylor tenorio, mic persaud. So [facility] [phone].    ATENCIÓN: Si habla español, tiene a singh disposición servicios gratuitos de asistencia lingüística. Llame al [phone].    We comply with applicable federal civil rights laws and [place] laws. We do not discriminate on the basis of race, color, national origin, age, disability sex, sexual orientation or gender identity.            After Visit Summary       This is your record. Keep this with you and show to your community pharmacist(s) and doctor(s) at your next visit.

## 2017-07-11 NOTE — DISCHARGE INSTRUCTIONS
Take your Zofran at home as directed for nausea as needed    Check your culture results in 2 days.    Please make an appointment to follow up with Your Primary Care Provider in 10-14 days for recheck.

## 2017-07-11 NOTE — ED NOTES
Bed: ED06  Expected date: 7/11/17  Expected time: 11:50 AM  Means of arrival:   Comments:  Harry [age]yo F back pain

## 2017-07-14 ENCOUNTER — HOSPITAL ENCOUNTER (EMERGENCY)
Facility: CLINIC | Age: 27
Discharge: HOME | End: 2017-07-14
Attending: EMERGENCY MEDICINE | Admitting: EMERGENCY MEDICINE
Payer: MEDICARE

## 2017-07-14 ENCOUNTER — ANESTHESIA (OUTPATIENT)
Dept: EMERGENCY MEDICINE | Facility: CLINIC | Age: 27
End: 2017-07-14
Payer: MEDICARE

## 2017-07-14 ENCOUNTER — NURSE TRIAGE (OUTPATIENT)
Dept: NURSING | Facility: CLINIC | Age: 27
End: 2017-07-14

## 2017-07-14 ENCOUNTER — APPOINTMENT (OUTPATIENT)
Dept: GENERAL RADIOLOGY | Facility: CLINIC | Age: 27
End: 2017-07-14
Attending: EMERGENCY MEDICINE
Payer: MEDICARE

## 2017-07-14 ENCOUNTER — APPOINTMENT (OUTPATIENT)
Dept: ULTRASOUND IMAGING | Facility: CLINIC | Age: 27
End: 2017-07-14
Attending: EMERGENCY MEDICINE
Payer: MEDICARE

## 2017-07-14 ENCOUNTER — ANESTHESIA EVENT (OUTPATIENT)
Dept: EMERGENCY MEDICINE | Facility: CLINIC | Age: 27
End: 2017-07-14
Payer: MEDICARE

## 2017-07-14 VITALS
OXYGEN SATURATION: 94 % | BODY MASS INDEX: 41.1 KG/M2 | SYSTOLIC BLOOD PRESSURE: 108 MMHG | HEART RATE: 74 BPM | TEMPERATURE: 97.8 F | HEIGHT: 62 IN | DIASTOLIC BLOOD PRESSURE: 76 MMHG | WEIGHT: 223.33 LBS | RESPIRATION RATE: 16 BRPM

## 2017-07-14 DIAGNOSIS — R10.30 LOWER ABDOMINAL PAIN: ICD-10-CM

## 2017-07-14 DIAGNOSIS — N83.202 LEFT OVARIAN CYST: ICD-10-CM

## 2017-07-14 DIAGNOSIS — N83.202 OVARIAN CYST, LEFT: ICD-10-CM

## 2017-07-14 LAB
ALBUMIN SERPL-MCNC: 3.4 G/DL (ref 3.4–5)
ALBUMIN UR-MCNC: NEGATIVE MG/DL
ALP SERPL-CCNC: 85 U/L (ref 40–150)
ALT SERPL W P-5'-P-CCNC: 20 U/L (ref 0–50)
ANION GAP SERPL CALCULATED.3IONS-SCNC: 11 MMOL/L (ref 3–14)
APPEARANCE UR: CLEAR
AST SERPL W P-5'-P-CCNC: 11 U/L (ref 0–45)
BACTERIA #/AREA URNS HPF: ABNORMAL /HPF
BASOPHILS # BLD AUTO: 0 10E9/L (ref 0–0.2)
BASOPHILS NFR BLD AUTO: 0.1 %
BILIRUB SERPL-MCNC: 0.2 MG/DL (ref 0.2–1.3)
BILIRUB UR QL STRIP: NEGATIVE
BUN SERPL-MCNC: 9 MG/DL (ref 7–30)
CALCIUM SERPL-MCNC: 8.5 MG/DL (ref 8.5–10.1)
CHLORIDE SERPL-SCNC: 109 MMOL/L (ref 94–109)
CO2 SERPL-SCNC: 23 MMOL/L (ref 20–32)
COLOR UR AUTO: ABNORMAL
CREAT SERPL-MCNC: 0.57 MG/DL (ref 0.52–1.04)
DIFFERENTIAL METHOD BLD: NORMAL
EOSINOPHIL # BLD AUTO: 0.1 10E9/L (ref 0–0.7)
EOSINOPHIL NFR BLD AUTO: 1.8 %
ERYTHROCYTE [DISTWIDTH] IN BLOOD BY AUTOMATED COUNT: 13.5 % (ref 10–15)
GFR SERPL CREATININE-BSD FRML MDRD: ABNORMAL ML/MIN/1.7M2
GLUCOSE SERPL-MCNC: 102 MG/DL (ref 70–99)
GLUCOSE UR STRIP-MCNC: NEGATIVE MG/DL
HCT VFR BLD AUTO: 35.9 % (ref 35–47)
HGB BLD-MCNC: 11.8 G/DL (ref 11.7–15.7)
HGB UR QL STRIP: ABNORMAL
IMM GRANULOCYTES # BLD: 0 10E9/L (ref 0–0.4)
IMM GRANULOCYTES NFR BLD: 0.1 %
KETONES UR STRIP-MCNC: NEGATIVE MG/DL
LACTATE BLD-SCNC: 1 MMOL/L (ref 0.7–2.1)
LEUKOCYTE ESTERASE UR QL STRIP: NEGATIVE
LIPASE SERPL-CCNC: 87 U/L (ref 73–393)
LYMPHOCYTES # BLD AUTO: 1.4 10E9/L (ref 0.8–5.3)
LYMPHOCYTES NFR BLD AUTO: 18.5 %
MCH RBC QN AUTO: 29.4 PG (ref 26.5–33)
MCHC RBC AUTO-ENTMCNC: 32.9 G/DL (ref 31.5–36.5)
MCV RBC AUTO: 89 FL (ref 78–100)
MONOCYTES # BLD AUTO: 0.4 10E9/L (ref 0–1.3)
MONOCYTES NFR BLD AUTO: 5.7 %
NEUTROPHILS # BLD AUTO: 5.4 10E9/L (ref 1.6–8.3)
NEUTROPHILS NFR BLD AUTO: 73.8 %
NITRATE UR QL: NEGATIVE
NRBC # BLD AUTO: 0 10*3/UL
NRBC BLD AUTO-RTO: 0 /100
PH UR STRIP: 7 PH (ref 5–7)
PLATELET # BLD AUTO: 233 10E9/L (ref 150–450)
POTASSIUM SERPL-SCNC: 3.8 MMOL/L (ref 3.4–5.3)
PROT SERPL-MCNC: 6.8 G/DL (ref 6.8–8.8)
RBC # BLD AUTO: 4.02 10E12/L (ref 3.8–5.2)
RBC #/AREA URNS AUTO: 9 /HPF (ref 0–2)
SODIUM SERPL-SCNC: 143 MMOL/L (ref 133–144)
SP GR UR STRIP: 1.01 (ref 1–1.03)
SQUAMOUS #/AREA URNS AUTO: 1 /HPF (ref 0–1)
URN SPEC COLLECT METH UR: ABNORMAL
UROBILINOGEN UR STRIP-MCNC: NORMAL MG/DL (ref 0–2)
WBC # BLD AUTO: 7.3 10E9/L (ref 4–11)
WBC #/AREA URNS AUTO: 1 /HPF (ref 0–2)

## 2017-07-14 PROCEDURE — 74020 XR ABDOMEN 2 VW: CPT

## 2017-07-14 PROCEDURE — 25000125 ZZHC RX 250: Performed by: NURSE ANESTHETIST, CERTIFIED REGISTERED

## 2017-07-14 PROCEDURE — 96361 HYDRATE IV INFUSION ADD-ON: CPT | Performed by: EMERGENCY MEDICINE

## 2017-07-14 PROCEDURE — 83605 ASSAY OF LACTIC ACID: CPT | Performed by: EMERGENCY MEDICINE

## 2017-07-14 PROCEDURE — 96376 TX/PRO/DX INJ SAME DRUG ADON: CPT | Performed by: EMERGENCY MEDICINE

## 2017-07-14 PROCEDURE — 85025 COMPLETE CBC W/AUTO DIFF WBC: CPT | Performed by: EMERGENCY MEDICINE

## 2017-07-14 PROCEDURE — 76376 3D RENDER W/INTRP POSTPROCES: CPT

## 2017-07-14 PROCEDURE — 80053 COMPREHEN METABOLIC PANEL: CPT | Performed by: EMERGENCY MEDICINE

## 2017-07-14 PROCEDURE — 40000671 ZZH STATISTIC ANESTHESIA CASE

## 2017-07-14 PROCEDURE — 96374 THER/PROPH/DIAG INJ IV PUSH: CPT | Performed by: EMERGENCY MEDICINE

## 2017-07-14 PROCEDURE — 76705 ECHO EXAM OF ABDOMEN: CPT

## 2017-07-14 PROCEDURE — 96375 TX/PRO/DX INJ NEW DRUG ADDON: CPT | Performed by: EMERGENCY MEDICINE

## 2017-07-14 PROCEDURE — 81001 URINALYSIS AUTO W/SCOPE: CPT | Performed by: EMERGENCY MEDICINE

## 2017-07-14 PROCEDURE — 99285 EMERGENCY DEPT VISIT HI MDM: CPT | Mod: Z6 | Performed by: EMERGENCY MEDICINE

## 2017-07-14 PROCEDURE — 25000128 H RX IP 250 OP 636: Performed by: EMERGENCY MEDICINE

## 2017-07-14 PROCEDURE — 83690 ASSAY OF LIPASE: CPT | Performed by: EMERGENCY MEDICINE

## 2017-07-14 PROCEDURE — 99285 EMERGENCY DEPT VISIT HI MDM: CPT | Mod: 25 | Performed by: EMERGENCY MEDICINE

## 2017-07-14 RX ORDER — PROCHLORPERAZINE MALEATE 5 MG
5 TABLET ORAL EVERY 6 HOURS PRN
Qty: 10 TABLET | Refills: 0 | Status: ON HOLD | OUTPATIENT
Start: 2017-07-14 | End: 2017-07-20

## 2017-07-14 RX ORDER — HYDROMORPHONE HYDROCHLORIDE 1 MG/ML
0.5 INJECTION, SOLUTION INTRAMUSCULAR; INTRAVENOUS; SUBCUTANEOUS
Status: COMPLETED | OUTPATIENT
Start: 2017-07-14 | End: 2017-07-14

## 2017-07-14 RX ORDER — LIDOCAINE HYDROCHLORIDE 10 MG/ML
INJECTION, SOLUTION INFILTRATION; PERINEURAL PRN
Status: DISCONTINUED | OUTPATIENT
Start: 2017-07-14 | End: 2017-07-14

## 2017-07-14 RX ORDER — SODIUM CHLORIDE 9 MG/ML
1000 INJECTION, SOLUTION INTRAVENOUS CONTINUOUS
Status: DISCONTINUED | OUTPATIENT
Start: 2017-07-14 | End: 2017-07-14 | Stop reason: HOSPADM

## 2017-07-14 RX ADMIN — SODIUM CHLORIDE 1000 ML: 9 INJECTION, SOLUTION INTRAVENOUS at 08:15

## 2017-07-14 RX ADMIN — PROCHLORPERAZINE EDISYLATE 5 MG: 5 INJECTION INTRAMUSCULAR; INTRAVENOUS at 09:39

## 2017-07-14 RX ADMIN — HYDROMORPHONE HYDROCHLORIDE 0.5 MG: 1 INJECTION, SOLUTION INTRAMUSCULAR; INTRAVENOUS; SUBCUTANEOUS at 09:46

## 2017-07-14 RX ADMIN — HYDROMORPHONE HYDROCHLORIDE 1 MG: 1 INJECTION, SOLUTION INTRAMUSCULAR; INTRAVENOUS; SUBCUTANEOUS at 08:49

## 2017-07-14 RX ADMIN — LIDOCAINE HYDROCHLORIDE 0.5 ML: 10 INJECTION, SOLUTION INFILTRATION; PERINEURAL at 09:18

## 2017-07-14 RX ADMIN — SODIUM CHLORIDE 1000 ML: 9 INJECTION, SOLUTION INTRAVENOUS at 12:18

## 2017-07-14 RX ADMIN — PROCHLORPERAZINE EDISYLATE 5 MG: 5 INJECTION INTRAMUSCULAR; INTRAVENOUS at 09:40

## 2017-07-14 ASSESSMENT — ENCOUNTER SYMPTOMS
DYSURIA: 0
COUGH: 0
FEVER: 0
SHORTNESS OF BREATH: 0
DIARRHEA: 0
NAUSEA: 1
ABDOMINAL PAIN: 1

## 2017-07-14 NOTE — ED PROVIDER NOTES
"  History     Chief Complaint   Patient presents with     Abdominal Pain     Nausea     Patient is a 26 year old female presenting with nausea.   Nausea   Associated symptoms include abdominal pain. Pertinent negatives include no chest pain and no shortness of breath.     Nevin Alvarado is a 26 year old female with a history of borderline personality disorder, anxiety, depression and previous foreign body ingestions who presents with abdominal pain.  She reports pain across her lower abdomen worse in the left lower quadrant.  This has been present for approximately one week.  She was seen 3 days ago and had an extensive workup including CT and ultrasound which was largely unremarkable with exception of a left ovarian cyst.  She was treated for possible salpingitis at that time given that she does have an IUD in place.  Her cultures for GC and Chlamydia both returned and are negative.  She was noted to also have a yeast infection at that time and treated.  She reports that her symptoms have persisted and today had an episode of vomiting.  She denies any fevers.  She denies any urinary symptoms including dysuria or hematuria.  She has been having regular bowel movements.  She reports that she is on her menstrual cycle and has spotting but no heavy menstrual bleeding or other unusual discharge.    I have reviewed the Medications, Allergies, Past Medical and Surgical History, and Social History in the Epic system.    Review of Systems   Constitutional: Negative for fever.   Respiratory: Negative for cough and shortness of breath.    Cardiovascular: Negative for chest pain.   Gastrointestinal: Positive for abdominal pain and nausea. Negative for diarrhea.   Genitourinary: Negative for dysuria.   All other systems reviewed and are negative.      Physical Exam   BP: 132/73  Pulse: 93  Temp: 97.9  F (36.6  C)  Resp: 16  Height: 157.5 cm (5' 2\")  Weight: 101.3 kg (223 lb 5.2 oz)  SpO2: 97 %  Physical Exam  General: " patient is alert and oriented and in no acute distress   Head: atraumatic and normocephalic   EENT: moist mucus membranes without tonsillar erythema or exudates, pupils round and reactive, sclerae anicteric  Neck: supple   Cardiovascular: regular rate and rhythm, extremities warm and well perfused, no lower extremity edema  Pulmonary: lungs clear to auscultation bilaterally   Abdomen: soft, nondistended, minimal tenderness to palpation in the lower abdomen without any involuntary guarding, no CVA tenderness  Musculoskeletal: normal range of motion   Neurological: alert and oriented, moving all extremities symmetrically, gait normal   Skin: warm, dry     ED Course     ED Course     Procedures             Critical Care time:  none               Labs Ordered and Resulted from Time of ED Arrival Up to the Time of Departure from the ED - No data to display         Assessments & Plan (with Medical Decision Making)   Ms. Alvarado is a 26 year old female with a history of borderline personality disorder, anxiety, depression and previous foreign body ingestions who presents with abdominal pain.  I have reviewed the patient's recent visits and she has had an extensive evaluation for her abdominal pain.  She did have a CT of the abdomen just a few days ago which was unremarkable.  Suspicion for appendicitis or other intra-abdominal infection at this time is low.  I have repeated her pelvic ultrasound which does show a left ovarian cyst 4.5 cm without any evidence of torsion.  Her IUD is in appropriate position.  She does not have any evidence of foreign body on ultrasound.  Her recent CT did show possible ductal dilatation of the gallbladder and ultrasound was obtained today which shows no acute pathology.  Given her history of ingested foreign bodies I have obtained an x-ray which does not show any evidence of foreign body and patient denies any recent ingestions.  She is afebrile and hemodynamically stable.  No  leukocytosis. Pain is similar to previous endometriosis and she has had ongoing vaginal bleeding for 3 weeks which may be contributing to her pain.  Hgb is stable at 11.8.  Will plan to obtain labs including CMP, lipase and UA to further evaluate.  If these are negative anticipate that she may be treated symptomatically and follow up in clinic. Patient signed out to day provider pending labs.      I have reviewed the nursing notes.    I have reviewed the findings, diagnosis, plan and need for follow up with the patient.    New Prescriptions    No medications on file       Final diagnoses:   Lower abdominal pain   Left ovarian cyst       7/14/2017   [facility], [place], EMERGENCY DEPARTMENT     Rhina Reyes MD  07/14/17 0926       Rhina Reyes MD  07/14/17 9703

## 2017-07-14 NOTE — ED NOTES
12:12 PM  Patient was signed out to me pending urinalysis which was obtained and revealed microscopic hematuria.  Patient will be discharged to follow-up with the primary physician.     Zay Doyle MD  07/14/17 1531

## 2017-07-14 NOTE — TELEPHONE ENCOUNTER
Reason for Disposition    Taking prescription medication that could cause nausea (e.g., narcotics/opiates, antibiotics, OCPs, many others)    Additional Information    Negative: Shock suspected (e.g., cold/pale/clammy skin, too weak to stand, low BP, rapid pulse)    Negative: Sounds like a life-threatening emergency to the triager    Negative: [1] Nausea or vomiting AND [2] pregnancy < 20 weeks    Negative: Menstrual Period - Missed or Late (i.e., pregnancy suspected)    Negative: Heat exhaustion suspected (i.e., dehydration from heat exposure)    Negative: Motion sickness suspected (i.e., nausea with car, plane, boat, or train travel)    Negative: Anxiety or stress suspected (i.e., nausea with anxiety attacks or stressful situations)    Negative: Traumatic Brain Injury (TBI) suspected    Negative: Vomiting occurs    Negative: Other symptom is present, see that guideline.  (e.g., chest pain, headache, dizziness, abdominal pain, colds, sore throat, etc.).    Negative: Unable to walk, or can only walk with assistance (e.g., requires support)    Negative: Difficulty breathing    Negative: [1] Insulin-dependent diabetes (Type I) AND [2] glucose > 400 mg/dl (22 mmol/l)    Negative: [1] Drinking very little AND [2] dehydration suspected (e.g., no urine > 12 hours, very dry mouth, very lightheaded)    Negative: Patient sounds very sick or weak to the triager    Negative: Fever > 104 F (40 C)    Negative: [1] Fever > 101 F (38.3 C) AND [2] age > 60    Negative: [1] Fever > 101 F (38.3 C) AND [2] bedridden (e.g., nursing home patient, CVA, chronic illness, recovering from surgery)    Negative: [1] Fever > 100.5 F (38.1 C) AND [2] diabetes mellitus or weak immune system (e.g., HIV positive, cancer chemo, splenectomy, organ transplant, chronic steroids)    Negative: Taking any of the following medications: digoxin (Lanoxin), lithium, theophylline, phenytoin (Dilantin)    Negative: Yellowish color of the skin or white of the  eye (i.e., jaundice)    Negative: Fever present > 3 days (72 hours)    Negative: Receiving cancer chemotherapy medication    Protocols used: NAUSEA-ADULT-AH  Patient states she is having nausea from antibiotics prescribed for her. Triager advised patient to call back in the am to speak with care team for new orders.

## 2017-07-14 NOTE — DISCHARGE INSTRUCTIONS
Please make an appointment to follow up with Your Primary Care Provider as soon as possible.  Please make an appointment to follow up with OB/Gyn--[facility] (phone: [phone]), OB/Gyn--[facility] (phone: [phone]) and OB/Gyn--[facility] (phone: [phone]) as soon as possible.  Continue taking previously prescribed antibiotics.  Use compazine as needed for nausea.  If you have worsening pain, intractable vomiting, fevers or other concerns, return to the emergency department for re-evaluation.            *Abdominal Pain, Unknown Cause (Female)    The exact cause of your abdominal (stomach) pain is not certain. This does not mean that this is something to worry about, or the right tests were not done. Everyone likes to know the exact cause of the problem, but sometimes with abdominal pain, there is no clear-cut cause, and this could be a good thing. The good news is that your symptoms can be treated, and you will feel better.   Your condition does not seem serious now; however, sometimes the signs of a serious problem may take more time to appear. For this reason, it is important for you to watch for any new symptoms, problems, or worsening of your condition.  Over the next few days, the abdominal pain may come and go, or be continuous. Other common symptoms can include nausea and vomiting. Sometimes it can be difficult to tell if you feel nauseous, you may just feel bad and not associate that feeling with nausea. Constipation, diarrhea, and a fever may go along with the pain.  The pain may continue even if treated correctly over the following days. Depending on how things go, sometimes the cause can become clear and may require further or different treatment. Additional evaluations, medications, or tests may be needed.  Home care  Your health care provider may prescribe medications for pain, symptoms, or an infection.  Follow the health care provider's instructions  for taking these medications.  General care    Rest until your next exam. No strenuous activities.    Try to find positions that ease discomfort. A small pillow placed on the abdomen may help relieve pain.    Something warm on your abdomen (such as a heating pad) may help, but be careful not to burn yourself.  Diet    Do not force yourself to eat, especially if having cramps, vomiting, or diarrhea.    Water is important so you do not get dehydrated. Soup may also be good. Sports drinks may also help, especially if they are not too acidic. Make sure you don't drink sugary drinks as this can make things worse. Take liquids in small amounts. Do not guzzle them.    Caffeine sometimes makes the pain and cramping worse.    Avoid dairy products if you have vomiting or diarrhea.    Don't eat large amounts at a time. Wait a few minutes between bites.    Eat a diet low in fiber (called a low-residue diet). Foods allowed include refined breads, white rice, fruit and vegetable juices without pulp, tender meats. These foods will pass more easily through the intestine.    Avoid fried or fatty foods, dairy, alcohol and spicy foods until your symptoms go away.  Follow-up care  Follow up with your health care provider as instructed, or if your pain does not begin to improve in the next 24 hours.  When to seek medical care  Seek prompt medical care if any of the following occur:    Pain gets worse or moves to the right lower abdomen    New or worsening vomiting or diarrhea    Swelling of the abdomen    Unable to pass stool for more than three days    New fever over 101  F (38.3 C), or rising fever    Blood in vomit or bowel movements (dark red or black color)    Jaundice (yellow color of eyes and skin)    Weakness, dizziness    Chest, arm, back, neck or jaw pain    Unexpected vaginal bleeding or missed period  Call 911  Call emergency services if any of the following occur:    Trouble breathing    Confusion    Fainting or loss of  consciousness    Rapid heart rate    Seizure    [address] [facility], [address] [place] [address], [place] [address]. All rights reserved. This information is not intended as a substitute for professional medical care. Always follow your healthcare professional's instructions.

## 2017-07-14 NOTE — ED AVS SNAPSHOT
[facility], Emergency Department    [address]    [facility] [address]    Phone:  [phone]    Fax:  [phone]                                       Nevin Alvarado   MRN: [id number]    Department:  [facility], Emergency Department   Date of Visit:  7/14/2017           After Visit Summary Signature Page     I have received my discharge instructions, and my questions have been answered. I have discussed any challenges I see with this plan with the nurse or doctor.    ..........................................................................................................................................  Patient/Patient Representative Signature      ..........................................................................................................................................  Patient Representative Print Name and Relationship to Patient    ..................................................               ................................................  Date                                            Time    ..........................................................................................................................................  Reviewed by Signature/Title    ...................................................              ..............................................  Date                                                            Time

## 2017-07-14 NOTE — ED AVS SNAPSHOT
[facility], Emergency Department    [address]    [place] [address]    Phone:  [phone]    Fax:  [phone]                                       Nevin Alvarado   MRN: [id number]    Department:  [facility], Emergency Department   Date of Visit:  7/14/2017           Patient Information     Date Of Birth          1990        Your diagnoses for this visit were:     Lower abdominal pain     Left ovarian cyst        You were seen by Rhina Reyes MD and Zay Doyle MD.        Discharge Instructions       Please make an appointment to follow up with Your Primary Care Provider as soon as possible.  Please make an appointment to follow up with OB/Gyn--[facility] (phone: [phone]), OB/Gyn--[facility] (phone: [phone]) and OB/Gyn--[facility] (phone: [phone]) as soon as possible.  Continue taking previously prescribed antibiotics.  Use compazine as needed for nausea.  If you have worsening pain, intractable vomiting, fevers or other concerns, return to the emergency department for re-evaluation.            *Abdominal Pain, Unknown Cause (Female)    The exact cause of your abdominal (stomach) pain is not certain. This does not mean that this is something to worry about, or the right tests were not done. Everyone likes to know the exact cause of the problem, but sometimes with abdominal pain, there is no clear-cut cause, and this could be a good thing. The good news is that your symptoms can be treated, and you will feel better.   Your condition does not seem serious now; however, sometimes the signs of a serious problem may take more time to appear. For this reason, it is important for you to watch for any new symptoms, problems, or worsening of your condition.  Over the next few days, the abdominal pain may come and go, or be continuous. Other common symptoms can include nausea and vomiting. Sometimes it can be difficult to  tell if you feel nauseous, you may just feel bad and not associate that feeling with nausea. Constipation, diarrhea, and a fever may go along with the pain.  The pain may continue even if treated correctly over the following days. Depending on how things go, sometimes the cause can become clear and may require further or different treatment. Additional evaluations, medications, or tests may be needed.  Home care  Your health care provider may prescribe medications for pain, symptoms, or an infection.  Follow the health care provider's instructions for taking these medications.  General care    Rest until your next exam. No strenuous activities.    Try to find positions that ease discomfort. A small pillow placed on the abdomen may help relieve pain.    Something warm on your abdomen (such as a heating pad) may help, but be careful not to burn yourself.  Diet    Do not force yourself to eat, especially if having cramps, vomiting, or diarrhea.    Water is important so you do not get dehydrated. Soup may also be good. Sports drinks may also help, especially if they are not too acidic. Make sure you don't drink sugary drinks as this can make things worse. Take liquids in small amounts. Do not guzzle them.    Caffeine sometimes makes the pain and cramping worse.    Avoid dairy products if you have vomiting or diarrhea.    Don't eat large amounts at a time. Wait a few minutes between bites.    Eat a diet low in fiber (called a low-residue diet). Foods allowed include refined breads, white rice, fruit and vegetable juices without pulp, tender meats. These foods will pass more easily through the intestine.    Avoid fried or fatty foods, dairy, alcohol and spicy foods until your symptoms go away.  Follow-up care  Follow up with your health care provider as instructed, or if your pain does not begin to improve in the next 24 hours.  When to seek medical care  Seek prompt medical care if any of the following occur:    Pain  gets worse or moves to the right lower abdomen    New or worsening vomiting or diarrhea    Swelling of the abdomen    Unable to pass stool for more than three days    New fever over 101  F (38.3 C), or rising fever    Blood in vomit or bowel movements (dark red or black color)    Jaundice (yellow color of eyes and skin)    Weakness, dizziness    Chest, arm, back, neck or jaw pain    Unexpected vaginal bleeding or missed period  Call 911  Call emergency services if any of the following occur:    Trouble breathing    Confusion    Fainting or loss of consciousness    Rapid heart rate    Seizure    [id number] [facility], [address] [place] [id number]. All rights reserved. This information is not intended as a substitute for professional medical care. Always follow your healthcare professional's instructions.          24 Hour Appointment Hotline       To make an appointment at any [facility] clinic, call [phone] ([phone]). If you don't have a family doctor or clinic, we will help you find one. [facility] clinics are conveniently located to serve the needs of you and your family.             Review of your medicines      START taking        Dose / Directions Last dose taken    prochlorperazine 5 MG tablet   Commonly known as:  COMPAZINE   Dose:  5 mg   Quantity:  10 tablet        Take 1 tablet (5 mg) by mouth every 6 hours as needed for nausea or vomiting   Refills:  0          Our records show that you are taking the medicines listed below. If these are incorrect, please call your family doctor or clinic.        Dose / Directions Last dose taken    acetaminophen 500 MG tablet   Commonly known as:  TYLENOL   Dose:  500-1000 mg   Quantity:  90 tablet        Take 1-2 tablets (500-1,000 mg) by mouth every 8 hours as needed for mild pain   Refills:  3        albuterol 108 (90 BASE) MCG/ACT Inhaler   Commonly known as:  albuterol   Dose:  2 puff   Quantity:  1 Inhaler        Inhale 2 puffs into the  lungs every 4 hours as needed for shortness of breath / dyspnea   Refills:  0        augmented betamethasone dipropionate 0.05 % ointment   Commonly known as:  DIPROLENE-AF   Quantity:  45 g        Apply to AA BID x 2-3 weeks then PRN only   Refills:  3        clindamycin 300 MG capsule   Commonly known as:  CLEOCIN   Dose:  300 mg   Quantity:  40 capsule        Take 1 capsule (300 mg) by mouth 4 times daily for 10 days   Refills:  0        docosanol 10 % Crea cream   Commonly known as:  ABREVA   Quantity:  2 g        Apply topically 5 times daily As needed   Refills:  3        doxycycline 100 MG capsule   Commonly known as:  VIBRAMYCIN   Dose:  100 mg   Quantity:  20 capsule        Take 1 capsule (100 mg) by mouth 2 times daily for 10 days   Refills:  0        fluconazole 150 MG tablet   Commonly known as:  DIFLUCAN   Quantity:  1 tablet   Start taking on:  7/21/2017        Take one tablet in ten days (when done with antibiotics)   Refills:  0        ibuprofen 600 MG tablet   Commonly known as:  ADVIL/MOTRIN   Dose:  600 mg   Quantity:  30 tablet        Take 1 tablet (600 mg) by mouth every 6 hours as needed for moderate pain   Refills:  0        LAMICTAL 100 MG tablet   Dose:  100 mg   Generic drug:  lamoTRIgine        Take 1 tablet (100 mg) by mouth daily   Refills:  0        levonorgestrel 20 MCG/24HR IUD   Commonly known as:  MIRENA   Dose:  1 each        1 each (20 mcg) by Intrauterine route once for 1 dose   Refills:  0        ondansetron 4 MG ODT tab   Commonly known as:  ZOFRAN ODT   Dose:  4-8 mg   Quantity:  20 tablet        Take 1-2 tablets (4-8 mg) by mouth every 8 hours as needed for nausea   Refills:  1        oxyCODONE 5 MG IR tablet   Commonly known as:  ROXICODONE   Dose:  5 mg   Quantity:  20 tablet        Take 1 tablet (5 mg) by mouth every 6 hours as needed for pain   Refills:  0        PRISTIQ PO   Dose:  100 mg        Take 100 mg by mouth daily   Refills:  0        SUMATRIPTAN SUCCINATE PO    Dose:  50 mg        Take 50 mg by mouth once as needed for migraine Reported on 5/19/2017   Refills:  0        TOPAMAX 50 MG tablet   Dose:  50 mg   Generic drug:  topiramate        Take 1 tablet (50 mg) by mouth 2 times daily   Refills:  0        VITAMIN D3 PO   Dose:  5000 Units        Take 5,000 Units by mouth daily   Refills:  0                Prescriptions were sent or printed at these locations (1 Prescription)                   Other Prescriptions                Printed at Department/Unit printer (1 of 1)         prochlorperazine (COMPAZINE) 5 MG tablet                Procedures and tests performed during your visit     CBC with platelets differential    Comprehensive metabolic panel    Lactic acid whole blood    Lipase    UA with Microscopic    US Abdomen Limited    US Pelvic Complete w Transvaginal & Abd/Pel Duplex Limited    XR Abdomen 2 Views      Orders Needing Specimen Collection     None      Pending Results     Date and Time Order Name Status Description    7/14/2017 0551 US Pelvic Complete w Transvaginal & Abd/Pel Duplex Limited Preliminary             Pending Culture Results     No orders found from 7/12/2017 to 7/15/2017.            Pending Results Instructions     If you had any lab results that were not finalized at the time of your Discharge, you can call the ED Lab Result RN at [phone]. You will be contacted by this team for any positive Lab results or changes in treatment. The nurses are available 7 days a week from 10A to 6:30P.  You can leave a message 24 hours per day and they will return your call.        Thank you for choosing [facility]       Thank you for choosing [facility] for your care. Our goal is always to provide you with excellent care. Hearing back from our patients is one way we can continue to improve our services. Please take a few minutes to complete the written survey that you may receive in the mail after you visit with us. Thank you!        MyChart Information      [facility] gives you secure access to your electronic health record. If you see a primary care provider, you can also send messages to your care team and make appointments. If you have questions, please call your primary care clinic.  If you do not have a primary care provider, please call [phone] and they will assist you.        Care EveryWhere ID     This is your Care EveryWhere ID. This could be used by other organizations to access your [facility] medical records  [id number]        Equal Access to Services     ZENON DIAMOND : Hadii aad ku hadasho Soomaali, waaxda luqadaha, qaybta kaalmada adeegyagregory, mic angelo . So [facility] [phone].    ATENCIÓN: Si habla español, tiene a singh disposición servicios gratuitos de asistencia lingüística. Llame al [phone].    We comply with applicable federal civil rights laws and [place] laws. We do not discriminate on the basis of race, color, national origin, age, disability sex, sexual orientation or gender identity.            After Visit Summary       This is your record. Keep this with you and show to your community pharmacist(s) and doctor(s) at your next visit.

## 2017-07-16 ENCOUNTER — APPOINTMENT (OUTPATIENT)
Dept: GENERAL RADIOLOGY | Facility: CLINIC | Age: 27
End: 2017-07-16
Attending: EMERGENCY MEDICINE
Payer: MEDICARE

## 2017-07-16 ENCOUNTER — NURSE TRIAGE (OUTPATIENT)
Dept: NURSING | Facility: CLINIC | Age: 27
End: 2017-07-16

## 2017-07-16 ENCOUNTER — HOSPITAL ENCOUNTER (EMERGENCY)
Facility: CLINIC | Age: 27
Discharge: HOME | End: 2017-07-16
Attending: EMERGENCY MEDICINE | Admitting: EMERGENCY MEDICINE
Payer: MEDICARE

## 2017-07-16 VITALS
HEART RATE: 87 BPM | RESPIRATION RATE: 16 BRPM | SYSTOLIC BLOOD PRESSURE: 126 MMHG | BODY MASS INDEX: 41.34 KG/M2 | DIASTOLIC BLOOD PRESSURE: 74 MMHG | OXYGEN SATURATION: 96 % | WEIGHT: 226 LBS | TEMPERATURE: 98.2 F

## 2017-07-16 DIAGNOSIS — R10.30 LOWER ABDOMINAL PAIN: ICD-10-CM

## 2017-07-16 LAB
ALBUMIN SERPL-MCNC: 3.3 G/DL (ref 3.4–5)
ALBUMIN UR-MCNC: NEGATIVE MG/DL
ALP SERPL-CCNC: 70 U/L (ref 40–150)
ALT SERPL W P-5'-P-CCNC: 15 U/L (ref 0–50)
ANION GAP SERPL CALCULATED.3IONS-SCNC: 13 MMOL/L (ref 3–14)
APPEARANCE UR: CLEAR
AST SERPL W P-5'-P-CCNC: 13 U/L (ref 0–45)
BACTERIA #/AREA URNS HPF: ABNORMAL /HPF
BASOPHILS # BLD AUTO: 0 10E9/L (ref 0–0.2)
BASOPHILS NFR BLD AUTO: 0.1 %
BILIRUB SERPL-MCNC: 0.3 MG/DL (ref 0.2–1.3)
BILIRUB UR QL STRIP: NEGATIVE
BUN SERPL-MCNC: 9 MG/DL (ref 7–30)
CALCIUM SERPL-MCNC: 8.4 MG/DL (ref 8.5–10.1)
CHLORIDE SERPL-SCNC: 114 MMOL/L (ref 94–109)
CO2 SERPL-SCNC: 19 MMOL/L (ref 20–32)
COLOR UR AUTO: ABNORMAL
CREAT SERPL-MCNC: 0.53 MG/DL (ref 0.52–1.04)
DIFFERENTIAL METHOD BLD: ABNORMAL
EOSINOPHIL # BLD AUTO: 0.1 10E9/L (ref 0–0.7)
EOSINOPHIL NFR BLD AUTO: 0.8 %
ERYTHROCYTE [DISTWIDTH] IN BLOOD BY AUTOMATED COUNT: 13.6 % (ref 10–15)
GFR SERPL CREATININE-BSD FRML MDRD: ABNORMAL ML/MIN/1.7M2
GLUCOSE SERPL-MCNC: 90 MG/DL (ref 70–99)
GLUCOSE UR STRIP-MCNC: NEGATIVE MG/DL
HCG UR QL: NEGATIVE
HCT VFR BLD AUTO: 35 % (ref 35–47)
HGB BLD-MCNC: 11.6 G/DL (ref 11.7–15.7)
HGB UR QL STRIP: ABNORMAL
IMM GRANULOCYTES # BLD: 0 10E9/L (ref 0–0.4)
IMM GRANULOCYTES NFR BLD: 0.1 %
KETONES UR STRIP-MCNC: NEGATIVE MG/DL
LACTATE BLD-SCNC: 0.7 MMOL/L (ref 0.7–2.1)
LEUKOCYTE ESTERASE UR QL STRIP: NEGATIVE
LIPASE SERPL-CCNC: 96 U/L (ref 73–393)
LYMPHOCYTES # BLD AUTO: 1 10E9/L (ref 0.8–5.3)
LYMPHOCYTES NFR BLD AUTO: 11.2 %
MCH RBC QN AUTO: 29.7 PG (ref 26.5–33)
MCHC RBC AUTO-ENTMCNC: 33.1 G/DL (ref 31.5–36.5)
MCV RBC AUTO: 90 FL (ref 78–100)
MONOCYTES # BLD AUTO: 0.3 10E9/L (ref 0–1.3)
MONOCYTES NFR BLD AUTO: 3.4 %
NEUTROPHILS # BLD AUTO: 7.4 10E9/L (ref 1.6–8.3)
NEUTROPHILS NFR BLD AUTO: 84.4 %
NITRATE UR QL: NEGATIVE
NRBC # BLD AUTO: 0 10*3/UL
NRBC BLD AUTO-RTO: 0 /100
PH UR STRIP: 6 PH (ref 5–7)
PLATELET # BLD AUTO: 235 10E9/L (ref 150–450)
POTASSIUM SERPL-SCNC: 3.6 MMOL/L (ref 3.4–5.3)
PROT SERPL-MCNC: 6.7 G/DL (ref 6.8–8.8)
RBC # BLD AUTO: 3.91 10E12/L (ref 3.8–5.2)
RBC #/AREA URNS AUTO: 1 /HPF (ref 0–2)
SODIUM SERPL-SCNC: 146 MMOL/L (ref 133–144)
SP GR UR STRIP: 1 (ref 1–1.03)
SQUAMOUS #/AREA URNS AUTO: 1 /HPF (ref 0–1)
URN SPEC COLLECT METH UR: ABNORMAL
UROBILINOGEN UR STRIP-MCNC: NORMAL MG/DL (ref 0–2)
WBC # BLD AUTO: 8.7 10E9/L (ref 4–11)
WBC #/AREA URNS AUTO: <1 /HPF (ref 0–2)

## 2017-07-16 PROCEDURE — 74020 XR ABDOMEN 2 VW: CPT

## 2017-07-16 PROCEDURE — 96375 TX/PRO/DX INJ NEW DRUG ADDON: CPT

## 2017-07-16 PROCEDURE — 36415 COLL VENOUS BLD VENIPUNCTURE: CPT | Performed by: EMERGENCY MEDICINE

## 2017-07-16 PROCEDURE — 81025 URINE PREGNANCY TEST: CPT | Performed by: EMERGENCY MEDICINE

## 2017-07-16 PROCEDURE — 25000132 ZZH RX MED GY IP 250 OP 250 PS 637: Mod: GY | Performed by: EMERGENCY MEDICINE

## 2017-07-16 PROCEDURE — 25000128 H RX IP 250 OP 636: Performed by: EMERGENCY MEDICINE

## 2017-07-16 PROCEDURE — 96376 TX/PRO/DX INJ SAME DRUG ADON: CPT

## 2017-07-16 PROCEDURE — 96374 THER/PROPH/DIAG INJ IV PUSH: CPT

## 2017-07-16 PROCEDURE — 85025 COMPLETE CBC W/AUTO DIFF WBC: CPT | Performed by: EMERGENCY MEDICINE

## 2017-07-16 PROCEDURE — 99285 EMERGENCY DEPT VISIT HI MDM: CPT | Mod: 25

## 2017-07-16 PROCEDURE — 81001 URINALYSIS AUTO W/SCOPE: CPT | Performed by: EMERGENCY MEDICINE

## 2017-07-16 PROCEDURE — 96361 HYDRATE IV INFUSION ADD-ON: CPT | Mod: 59

## 2017-07-16 PROCEDURE — 83605 ASSAY OF LACTIC ACID: CPT | Performed by: EMERGENCY MEDICINE

## 2017-07-16 PROCEDURE — A9270 NON-COVERED ITEM OR SERVICE: HCPCS | Mod: GY | Performed by: EMERGENCY MEDICINE

## 2017-07-16 PROCEDURE — 80053 COMPREHEN METABOLIC PANEL: CPT | Performed by: EMERGENCY MEDICINE

## 2017-07-16 PROCEDURE — 83690 ASSAY OF LIPASE: CPT | Performed by: EMERGENCY MEDICINE

## 2017-07-16 PROCEDURE — 99285 EMERGENCY DEPT VISIT HI MDM: CPT | Mod: Z6 | Performed by: EMERGENCY MEDICINE

## 2017-07-16 RX ORDER — OLANZAPINE 5 MG/1
TABLET ORAL
Qty: 10 TABLET | Refills: 0 | Status: SHIPPED | OUTPATIENT
Start: 2017-07-16 | End: 2017-08-21

## 2017-07-16 RX ORDER — OLANZAPINE 5 MG/1
5 TABLET, ORALLY DISINTEGRATING ORAL ONCE
Status: COMPLETED | OUTPATIENT
Start: 2017-07-16 | End: 2017-07-16

## 2017-07-16 RX ORDER — PROMETHAZINE HYDROCHLORIDE 25 MG/ML
12.5 INJECTION, SOLUTION INTRAMUSCULAR; INTRAVENOUS ONCE
Status: COMPLETED | OUTPATIENT
Start: 2017-07-16 | End: 2017-07-16

## 2017-07-16 RX ORDER — HYDROMORPHONE HYDROCHLORIDE 1 MG/ML
0.5 INJECTION, SOLUTION INTRAMUSCULAR; INTRAVENOUS; SUBCUTANEOUS ONCE
Status: COMPLETED | OUTPATIENT
Start: 2017-07-16 | End: 2017-07-16

## 2017-07-16 RX ADMIN — HYDROMORPHONE HYDROCHLORIDE 0.5 MG: 1 INJECTION, SOLUTION INTRAMUSCULAR; INTRAVENOUS; SUBCUTANEOUS at 13:11

## 2017-07-16 RX ADMIN — SODIUM CHLORIDE 1000 ML: 9 INJECTION, SOLUTION INTRAVENOUS at 12:32

## 2017-07-16 RX ADMIN — OLANZAPINE 5 MG: 5 TABLET, ORALLY DISINTEGRATING ORAL at 15:02

## 2017-07-16 RX ADMIN — HYDROMORPHONE HYDROCHLORIDE 0.5 MG: 1 INJECTION, SOLUTION INTRAMUSCULAR; INTRAVENOUS; SUBCUTANEOUS at 15:15

## 2017-07-16 RX ADMIN — PROMETHAZINE HYDROCHLORIDE 12.5 MG: 25 INJECTION INTRAMUSCULAR; INTRAVENOUS at 12:29

## 2017-07-16 ASSESSMENT — ENCOUNTER SYMPTOMS
CHILLS: 0
CONSTIPATION: 0
NAUSEA: 1
SHORTNESS OF BREATH: 0
ABDOMINAL PAIN: 1
VOMITING: 1
DYSURIA: 0
FEVER: 0
DIARRHEA: 0

## 2017-07-16 NOTE — DISCHARGE INSTRUCTIONS
You can continue to take oxycodone for pain.  Take ibuprofen 600 mg 3 times a day to help the oxycodone control your pain better.     You can take zofran for nausea and vomiting.  (has this at home)    You can take zyprexa 5 mg at bedtime for sleep if needed.     Please call your primary care doctor on Monday and schedule a follow up appointment to discuss management of your abdominal pain.      Follow up with your gynecologist as previously scheduled to discuss further management of your pain.

## 2017-07-16 NOTE — ED PROVIDER NOTES
History     Chief Complaint   Patient presents with     Abdominal Pain     Has been here 3 times this week.  hx of PID.  Nausea and vomitng.     HPI  Nevin Alvarado is a 26 year old female who has been seen in the Emergency Department three times in the past week for abdominal pain. The patient was seen at [facility] ED (7/08/17) and had a tampon removed after the string broke. She returned with abdominal pain on 7/11/17. CT of the abdomen and pelvic ultrasound were perfrormed on 7/11/17 (see below). Pelvic ultrasound was repeated on a return visit on 7/14/17 (see below). The patient presents today saying she continues to have isma-umbilical abdominal pain with associated nausea and vomiting. She says she was told her pain was likely due to PID and was given anti-biotics, but has not improved and has actually worsened. She has taken oxycodone and Zofran at home, but states she can barely ingest anything without vomiting. She believes that pain is due to endometriosis as she states she has had ongoing vaginal bleeding for the past three weeks. She denies any fever, chills, dysuria, or bowel symptoms. Her last BM was this morning.    Pelvic ultrasound (7/14/17):  IMPRESSION:  1. Left ovarian simple cyst measures 4.5 cm.  2. IUD appears to be in good position within the endometrium.   3. Otherwise unremarkable pelvic ultrasound. No convincing sonographic  evidence for ovarian torsion.    CT Abdomen and Pelvis (7/11/17):  IMPRESSION:  1. Interval development of mild intrahepatic and extrahepatic biliary  ductal prominence for age. Ultrasound exam is recommended for further  evaluation.  2. Previously seen spring-shaped structure passing through the  ileocecal valve is no longer identified.  3. Interval development of a 4.5 cm left ovarian cyst.    Pelvic ultrasound (7/11/17):  IMPRESSION:  1. Normal-appearing uterus and right ovary with IUD in good position.  2. Simple appearing left ovarian cyst.  3.  Arterial and venous flow identified to both ovaries.    Past Medical History:   Diagnosis Date     ADD (attention deficit disorder)      Anorexia nervosa with bulimia     history of; on Topamax     Anxiety      Borderline personality disorder      Depression      H/O self-harm      H/O swallowed foreign body     Recurrent issue     Lives in independent group home     due to debilitating mental illness     Migraine without aura     no known triggers; on Topamax bid and Imitrex PRN     Morbid obesity (H)      PTSD (post-traumatic stress disorder)      Rectal foreign body     Recurrent issue       Past Surgical History:   Procedure Laterality Date     ESOPHAGOSCOPY, GASTROSCOPY, DUODENOSCOPY (EGD), COMBINED N/A 3/9/2017    Procedure: COMBINED ESOPHAGOSCOPY, GASTROSCOPY, DUODENOSCOPY (EGD), REMOVE FOREIGN BODY;  Surgeon: Avis Guzmán MD;  Location: [facility] OR     ESOPHAGOSCOPY, GASTROSCOPY, DUODENOSCOPY (EGD), COMBINED N/A 4/20/2017    Procedure: COMBINED ESOPHAGOSCOPY, GASTROSCOPY, DUODENOSCOPY (EGD), REMOVE FOREIGN BODY;  EGD removal Foregin body;  Surgeon: Lokesh Paula MD;  Location: [facility] OR     ESOPHAGOSCOPY, GASTROSCOPY, DUODENOSCOPY (EGD), COMBINED N/A 6/12/2017    Procedure: COMBINED ESOPHAGOSCOPY, GASTROSCOPY, DUODENOSCOPY (EGD);  COMBINED ESOPHAGOSCOPY, GASTROSCOPY, DUODENOSCOPY (EGD) [[id number]]attempted removal of foreign body;  Surgeon: Pamela Perez MD;  Location: [facility] OR     ESOPHAGOSCOPY, GASTROSCOPY, DUODENOSCOPY (EGD), COMBINED N/A 6/9/2017    Procedure: COMBINED ESOPHAGOSCOPY, GASTROSCOPY, DUODENOSCOPY (EGD), REMOVE FOREIGN BODY;  Esophagoscopy, Gastroscopy, Duodenoscopy, Removal of Foreign Body;  Surgeon: Dejon Marsh MD;  Location: [facility] OR     EXAM UNDER ANESTHESIA ANUS N/A 1/10/2017    Procedure: EXAM UNDER ANESTHESIA ANUS;  Surgeon: Annmarie Haynes MD;  Location: [facility] OR     HC REMOVE FECAL IMPACTION OR FB W ANESTHESIA N/A 12/18/2016    Procedure: REMOVE  FECAL IMPACTION/FOREIGN BODY UNDER ANESTHESIA;  Surgeon: Soham Cano MD;  Location: [facility] OR     KNEE SURGERY Right     removed a small tissue mass from knee     LAPAROSCOPIC ABLATION ENDOMETRIOSIS       LAPAROTOMY EXPLORATORY N/A 1/10/2017    Procedure: LAPAROTOMY EXPLORATORY;  Surgeon: Annmarie Haynes MD;  Location: [facility] OR     lymph nodes removed from neck; benign  age 7     MAMMOPLASTY REDUCTION Bilateral      RELEASE CARPAL TUNNEL Bilateral      SIGMOIDOSCOPY FLEXIBLE N/A 1/10/2017    Procedure: SIGMOIDOSCOPY FLEXIBLE;  Surgeon: Annmarie Haynes MD;  Location: [facility] OR       Family History   Problem Relation Age of Onset     Type 2 Diabetes Maternal Grandmother      Type 2 Diabetes Paternal Grandmother      Breast Cancer Paternal Grandmother      CEREBROVASCULAR DISEASE Father 53     Myocardial Infarction No family hx of      Coronary Artery Disease Early Onset No family hx of      Ovarian Cancer No family hx of      Colon Cancer No family hx of        Social History   Substance Use Topics     Smoking status: Never Smoker     Smokeless tobacco: Never Used     Alcohol use No       No current facility-administered medications for this encounter.      Current Outpatient Prescriptions   Medication     acetaminophen (TYLENOL) 500 MG tablet     prochlorperazine (COMPAZINE) 5 MG tablet     clindamycin (CLEOCIN) 300 MG capsule     doxycycline (VIBRAMYCIN) 100 MG capsule     oxyCODONE (ROXICODONE) 5 MG IR tablet     [START ON 7/21/2017] fluconazole (DIFLUCAN) 150 MG tablet     docosanol (ABREVA) 10 % CREA cream     topiramate (TOPAMAX) 50 MG tablet     ondansetron (ZOFRAN ODT) 4 MG ODT tab     lamoTRIgine (LAMICTAL) 100 MG tablet     augmented betamethasone dipropionate (DIPROLENE-AF) 0.05 % ointment     levonorgestrel (MIRENA) 20 MCG/24HR IUD     ibuprofen (ADVIL/MOTRIN) 600 MG tablet     albuterol (ALBUTEROL) 108 (90 BASE) MCG/ACT Inhaler     SUMATRIPTAN SUCCINATE PO     Desvenlafaxine Succinate  (PRISTIQ PO)     Cholecalciferol (VITAMIN D3 PO)        Allergies   Allergen Reactions     Augmentin Swelling     Blood-Group Specific Substance Other (See Comments)     Patient has an anti-Cw and non-specific antibodies. Blood product orders may be delayed. Draw one red top and two purple top tubes for all type/screen/crossmatch orders.     Hydrocodone Nausea and Vomiting     vomiting for days     Influenza Vaccines Other (See Comments)     Oseltamivir Hives     med stopped, PN: med stopped     Vicodin [Hydrocodone-Acetaminophen] Nausea and Vomiting     Cefazolin Rash     Cephalosporins Rash         I have reviewed the Medications, Allergies, Past Medical and Surgical History, and Social History in the Epic system.    Review of Systems   Constitutional: Negative for chills and fever.   Respiratory: Negative for shortness of breath.    Cardiovascular: Negative for chest pain.   Gastrointestinal: Positive for abdominal pain, nausea and vomiting. Negative for constipation and diarrhea.   Genitourinary: Negative for dysuria.   All other systems reviewed and are negative.      Physical Exam   BP: (!) 135/111  Pulse: 92  Temp: 98.2  F (36.8  C)  Resp: 16  Weight: 102.5 kg (226 lb)  SpO2: 96 %  Physical Exam   Constitutional: She is oriented to person, place, and time. She appears well-developed.   Curled up in a ball.  Crying. Anxious.    HENT:   Head: Normocephalic and atraumatic.   Right Ear: External ear normal.   Left Ear: External ear normal.   Nose: Nose normal.   Mouth/Throat: Oropharynx is clear and moist.   Eyes: EOM are normal. Pupils are equal, round, and reactive to light.   Neck: Normal range of motion. Neck supple.   Cardiovascular: Normal rate, regular rhythm and normal heart sounds.    Pulmonary/Chest: Effort normal and breath sounds normal.   Abdominal: Soft. Bowel sounds are normal. She exhibits no distension and no mass. There is no tenderness. There is no rebound and no guarding.   Musculoskeletal:  Normal range of motion.   Neurological: She is alert and oriented to person, place, and time.   Skin: Skin is warm and dry. She is not diaphoretic.   Psychiatric: Her mood appears anxious.   Nursing note and vitals reviewed.      ED Course     ED Course     Procedures           Results for orders placed or performed during the hospital encounter of 07/16/17 (from the past 24 hour(s))   CBC with platelets differential   Result Value Ref Range    WBC 8.7 4.0 - 11.0 10e9/L    RBC Count 3.91 3.8 - 5.2 10e12/L    Hemoglobin 11.6 (L) 11.7 - 15.7 g/dL    Hematocrit 35.0 35.0 - 47.0 %    MCV 90 78 - 100 fl    MCH 29.7 26.5 - 33.0 pg    MCHC 33.1 31.5 - 36.5 g/dL    RDW 13.6 10.0 - 15.0 %    Platelet Count 235 150 - 450 10e9/L    Diff Method Automated Method     % Neutrophils 84.4 %    % Lymphocytes 11.2 %    % Monocytes 3.4 %    % Eosinophils 0.8 %    % Basophils 0.1 %    % Immature Granulocytes 0.1 %    Nucleated RBCs 0 0 /100    Absolute Neutrophil 7.4 1.6 - 8.3 10e9/L    Absolute Lymphocytes 1.0 0.8 - 5.3 10e9/L    Absolute Monocytes 0.3 0.0 - 1.3 10e9/L    Absolute Eosinophils 0.1 0.0 - 0.7 10e9/L    Absolute Basophils 0.0 0.0 - 0.2 10e9/L    Abs Immature Granulocytes 0.0 0 - 0.4 10e9/L    Absolute Nucleated RBC 0.0    Lipase   Result Value Ref Range    Lipase 96 73 - 393 U/L   Comprehensive metabolic panel   Result Value Ref Range    Sodium 146 (H) 133 - 144 mmol/L    Potassium 3.6 3.4 - 5.3 mmol/L    Chloride 114 (H) 94 - 109 mmol/L    Carbon Dioxide 19 (L) 20 - 32 mmol/L    Anion Gap 13 3 - 14 mmol/L    Glucose 90 70 - 99 mg/dL    Urea Nitrogen 9 7 - 30 mg/dL    Creatinine 0.53 0.52 - 1.04 mg/dL    GFR Estimate >90  Non  GFR Calc   >60 mL/min/1.7m2    GFR Estimate If Black >90   GFR Calc   >60 mL/min/1.7m2    Calcium 8.4 (L) 8.5 - 10.1 mg/dL    Bilirubin Total 0.3 0.2 - 1.3 mg/dL    Albumin 3.3 (L) 3.4 - 5.0 g/dL    Protein Total 6.7 (L) 6.8 - 8.8 g/dL    Alkaline Phosphatase 70 40 -  150 U/L    ALT 15 0 - 50 U/L    AST 13 0 - 45 U/L   Lactic acid whole blood   Result Value Ref Range    Lactic Acid 0.7 0.7 - 2.1 mmol/L   HCG qualitative urine   Result Value Ref Range    HCG Qual Urine Negative NEG   UA reflex to Microscopic   Result Value Ref Range    Color Urine Light Yellow     Appearance Urine Clear     Glucose Urine Negative NEG mg/dL    Bilirubin Urine Negative NEG    Ketones Urine Negative NEG mg/dL    Specific Gravity Urine 1.003 1.003 - 1.035    Blood Urine Moderate (A) NEG    pH Urine 6.0 5.0 - 7.0 pH    Protein Albumin Urine Negative NEG mg/dL    Urobilinogen mg/dL Normal 0.0 - 2.0 mg/dL    Nitrite Urine Negative NEG    Leukocyte Esterase Urine Negative NEG    Source Midstream Urine     RBC Urine 1 0 - 2 /HPF    WBC Urine <1 0 - 2 /HPF    Bacteria Urine Few (A) NEG /HPF    Squamous Epithelial /HPF Urine 1 0 - 1 /HPF   XR Abdomen 2 Views    Narrative    ABDOMEN TWO VIEW  7/16/2017 5:27 PM     HISTORY: Evaluate for foreign body, also having pain. Evaluate bowel  gas pattern.    COMPARISON: 7/14/17    FINDINGS: Normal bowel gas pattern. No evidence for obstruction. No  foreign body densities identified. Presumed pelvic phleboliths.  Contrast material that was present in the colon on 7/14/2017 is no  longer present. Scoliotic curve concave to the right in the lower  thoracic region. IUD in place.      Impression    IMPRESSION: Negative.    SHIKHA PUENTES MD              Assessments & Plan (with Medical Decision Making)   The patient presents with pain out of proportion to exam.  She has been seen twice in past 1-2 weeks with unremarkable tests.  Labs were rechecked today.  Her urine is not infected.  WBC normal.  Bimanual is unremarkable.  She already had negative GC and CHD recently.  No unusual vaginal d/c.  I discussed the case with the OB senior who reviewed the chart, testing, prior visits and discussed this with her staff.  She states the pt has hx of grade 1 endometriosis so this  is unlikely the cause of her pain.  By chart review it appears her pain is chronic.  The OB senior says if she were to be treated for endometriosis NSAIDS/ibuprofen would be the drug of choice.  She should f/u with her gynecologist to discuss if she should be put on OCP  Lupron would be a final choice that her gynecologist could consider but would put her in menopause and worsen mood issues.     I re examined the patient's abdomen which is benign.  However, she continues to cry and worry about what her doctors will say and that no one listens to her.  Her main issue seems to be her coping of this issue. I attempted to reassure her and asked that she f/u with her gynecologist and pmd.  She will likely need to follow up with pain management.  She may also do well working with a therapist regarding further coping skills to help her manage this issue.      I have reviewed the nursing notes.    I have reviewed the findings, diagnosis, plan and need for follow up with the patient.    New Prescriptions    No medications on file       Final diagnoses:   Lower abdominal pain   I, Van Pretty, am serving as a trained medical scribe to document services personally performed by Alyssa Menon MD, based on the provider's statements to me.      IAlyssa MD, was physically present and have reviewed and verified the accuracy of this note documented by Van Pretty.       7/16/2017   [facility], [place], EMERGENCY DEPARTMENT     Alyssa Menon MD  07/22/17 1903       Alyssa Menon MD  07/22/17 1903

## 2017-07-16 NOTE — ED AVS SNAPSHOT
[facility], Emergency Department    [address]    [place] [address]    Phone:  [phone]    Fax:  [phone]                                       Nevin Alvarado   MRN: [id number]    Department:  [facility], Emergency Department   Date of Visit:  7/16/2017           Patient Information     Date Of Birth          1990        Your diagnoses for this visit were:     Lower abdominal pain        You were seen by Alyssa Menon MD.        Discharge Instructions       You can continue to take oxycodone for pain.  Take ibuprofen 600 mg 3 times a day to help the oxycodone control your pain better.     You can take zofran for nausea and vomiting.  (has this at home)    You can take zyprexa 5 mg at bedtime for sleep if needed.     Please call your primary care doctor on Monday and schedule a follow up appointment to discuss management of your abdominal pain.      Follow up with your gynecologist as previously scheduled to discuss further management of your pain.     24 Hour Appointment Hotline       To make an appointment at any [facility] clinic, call [phone] ([phone]). If you don't have a family doctor or clinic, we will help you find one. [facility] clinics are conveniently located to serve the needs of you and your family.             Review of your medicines      START taking        Dose / Directions Last dose taken    OLANZapine 5 MG tablet   Commonly known as:  zyPREXA   Quantity:  10 tablet        Take 1 tablet (5 mg) orally at bedtime prn   Refills:  0          Our records show that you are taking the medicines listed below. If these are incorrect, please call your family doctor or clinic.        Dose / Directions Last dose taken    acetaminophen 500 MG tablet   Commonly known as:  TYLENOL   Dose:  500-1000 mg   Quantity:  90 tablet        Take 1-2 tablets (500-1,000 mg) by mouth every 8 hours as needed for mild pain   Refills:  3        albuterol 108 (90 BASE) MCG/ACT  Inhaler   Commonly known as:  albuterol   Dose:  2 puff   Quantity:  1 Inhaler        Inhale 2 puffs into the lungs every 4 hours as needed for shortness of breath / dyspnea   Refills:  0        augmented betamethasone dipropionate 0.05 % ointment   Commonly known as:  DIPROLENE-AF   Quantity:  45 g        Apply to AA BID x 2-3 weeks then PRN only   Refills:  3        clindamycin 300 MG capsule   Commonly known as:  CLEOCIN   Dose:  300 mg   Quantity:  40 capsule        Take 1 capsule (300 mg) by mouth 4 times daily for 10 days   Refills:  0        docosanol 10 % Crea cream   Commonly known as:  ABREVA   Quantity:  2 g        Apply topically 5 times daily As needed   Refills:  3        doxycycline 100 MG capsule   Commonly known as:  VIBRAMYCIN   Dose:  100 mg   Quantity:  20 capsule        Take 1 capsule (100 mg) by mouth 2 times daily for 10 days   Refills:  0        fluconazole 150 MG tablet   Commonly known as:  DIFLUCAN   Quantity:  1 tablet   Start taking on:  7/21/2017        Take one tablet in ten days (when done with antibiotics)   Refills:  0        ibuprofen 600 MG tablet   Commonly known as:  ADVIL/MOTRIN   Dose:  600 mg   Quantity:  30 tablet        Take 1 tablet (600 mg) by mouth every 6 hours as needed for moderate pain   Refills:  0        LAMICTAL 100 MG tablet   Dose:  100 mg   Generic drug:  lamoTRIgine        Take 1 tablet (100 mg) by mouth daily   Refills:  0        levonorgestrel 20 MCG/24HR IUD   Commonly known as:  MIRENA   Dose:  1 each        1 each (20 mcg) by Intrauterine route once for 1 dose   Refills:  0        ondansetron 4 MG ODT tab   Commonly known as:  ZOFRAN ODT   Dose:  4-8 mg   Quantity:  20 tablet        Take 1-2 tablets (4-8 mg) by mouth every 8 hours as needed for nausea   Refills:  1        oxyCODONE 5 MG IR tablet   Commonly known as:  ROXICODONE   Dose:  5 mg   Quantity:  20 tablet        Take 1 tablet (5 mg) by mouth every 6 hours as needed for pain   Refills:  0         PRISTIQ PO   Dose:  100 mg        Take 100 mg by mouth daily   Refills:  0        prochlorperazine 5 MG tablet   Commonly known as:  COMPAZINE   Dose:  5 mg   Quantity:  10 tablet        Take 1 tablet (5 mg) by mouth every 6 hours as needed for nausea or vomiting   Refills:  0        SUMATRIPTAN SUCCINATE PO   Dose:  50 mg        Take 50 mg by mouth once as needed for migraine Reported on 5/19/2017   Refills:  0        TOPAMAX 50 MG tablet   Dose:  50 mg   Generic drug:  topiramate        Take 1 tablet (50 mg) by mouth 2 times daily   Refills:  0        VITAMIN D3 PO   Dose:  5000 Units        Take 5,000 Units by mouth daily   Refills:  0                Prescriptions were sent or printed at these locations (1 Prescription)                   Other Prescriptions                Printed at Department/Unit printer (1 of 1)         OLANZapine (ZYPREXA) 5 MG tablet                Procedures and tests performed during your visit     CBC with platelets differential    Comprehensive metabolic panel    HCG qualitative urine    Lactic acid whole blood    Lipase    UA reflex to Microscopic    XR Abdomen 2 Views      Orders Needing Specimen Collection     None      Pending Results     No orders found from 7/14/2017 to 7/17/2017.            Pending Culture Results     No orders found from 7/14/2017 to 7/17/2017.            Pending Results Instructions     If you had any lab results that were not finalized at the time of your Discharge, you can call the ED Lab Result RN at [phone]. You will be contacted by this team for any positive Lab results or changes in treatment. The nurses are available 7 days a week from 10A to 6:30P.  You can leave a message 24 hours per day and they will return your call.        Thank you for choosing [facility]       Thank you for choosing [facility] for your care. Our goal is always to provide you with excellent care. Hearing back from our patients is one way we can continue to improve our services.  Please take a few minutes to complete the written survey that you may receive in the mail after you visit with us. Thank you!        [employer] Information     [employer] gives you secure access to your electronic health record. If you see a primary care provider, you can also send messages to your care team and make appointments. If you have questions, please call your primary care clinic.  If you do not have a primary care provider, please call [phone] and they will assist you.        Care EveryWhere ID     This is your Care EveryWhere ID. This could be used by other organizations to access your [place] medical records  [id number]        Equal Access to Services     [facility] : Gabriel Collado, erick singh, taylor tenorio, mic angelo . So [facility] [phone].    ATENCIÓN: Si habla español, tiene a singh disposición servicios gratuitos de asistencia lingüística. Llame al [phone].    We comply with applicable federal civil rights laws and [place] laws. We do not discriminate on the basis of race, color, national origin, age, disability sex, sexual orientation or gender identity.            After Visit Summary       This is your record. Keep this with you and show to your community pharmacist(s) and doctor(s) at your next visit.

## 2017-07-16 NOTE — ED NOTES
Staff assisted pt into the bathroom,Pt stated she fall on her way back to her bed. Unwitnessed fall per ERT. ERT found pt sitting up right leanning against the bed. Assisted her back to bed. Writer was on break during indecent. Vitals WNL. Pt alert and oriented, No injuries noted. Pt was assisted by one staff member.

## 2017-07-16 NOTE — ED NOTES
Bed: ED05  Expected date: 7/16/17  Expected time: 11:44 AM  Means of arrival:   Comments:  Enzo  26 year old female abdominal pain

## 2017-07-16 NOTE — TELEPHONE ENCOUNTER
"  Reason for Disposition    [1] SEVERE pelvic pain AND [2] present > 1 hour     \"I am having severe pelvic and abdominal pain again. I think it's my endometriosis . I have been in the ER twice this week, they didn't do much. I was given Oxycodone and it's not helping. I can't get in to see my OB until 7/25. This pain is so bad I feel like I am going to pass out and vomit.\" Patient crying in pain. Rates pelvic pain 10/10. States she's had her period for over a month and has an IUD. (see ER notes). Triaged and advised ER.    Additional Information    Negative: Shock suspected (e.g., cold/pale/clammy skin, too weak to stand, low BP, rapid pulse)    Negative: Passed out (i.e., lost consciousness, collapsed and was not responding)    Negative: Sounds like a life-threatening emergency to the triager    Negative: Menstrual cramps is main concern    Negative: Abdominal (stomach) pain is main concern    Negative: Vulvar (external genital area) pain or symptoms (e.g., dryness, sores, redness, blisters, bumps) is main concern    Negative: Rectal pain is main concern    Negative: Hip pain is main concern    Negative: Urination pain is main concern (pain with passing urine)    Negative: [1] Pelvic pain AND [2] pregnant < 20 weeks    Negative: [1] Pelvic pain AND [2] pregnant > 20 weeks    Negative: [1] Pelvic pain AND [2] postpartum < 1 month since delivery    Negative: Pain associated with known hernia or new-onset hernia suspected (reducible bulge in groin/abdomen)    Negative: Followed an abdomen (stomach) injury    Negative: Followed a genital area injury    Negative: [1] SEVERE pelvic pain (e.g., excruciating) AND [2] vomiting    Protocols used: PELVIC PAIN - FEMALE-ADULT-AH    "

## 2017-07-16 NOTE — ED AVS SNAPSHOT
[facility], Emergency Department    [address]    [facility] [address]    Phone:  [phone]    Fax:  [phone]                                       Nevin Alvarado   MRN: [id number]    Department:  [facility], Emergency Department   Date of Visit:  7/16/2017           After Visit Summary Signature Page     I have received my discharge instructions, and my questions have been answered. I have discussed any challenges I see with this plan with the nurse or doctor.    ..........................................................................................................................................  Patient/Patient Representative Signature      ..........................................................................................................................................  Patient Representative Print Name and Relationship to Patient    ..................................................               ................................................  Date                                            Time    ..........................................................................................................................................  Reviewed by Signature/Title    ...................................................              ..............................................  Date                                                            Time

## 2017-07-17 NOTE — ED NOTES
Pt called her insurance company for a ride to go home. Pt was informed she would have a ride but the wait could be 1/2 hour to 3 hours. Pt told the insurance to call the ED and informed them of the wait. Insurance company called the ED and told the nurse she had a ride but the wait time was 1/2 hour and 3. Pt was then told by the nurse the insurance company called and told the nurse the pt had a ride but the wait was 1/2 to 3 hours. Pt refused to leave room 5 and wait for her ride in the lobby.

## 2017-07-17 NOTE — ED NOTES
Pt came to the desk and stated that this RN needed to sign her discharge paperwork. Pt stated that insurance company stated that she needed signature on discharge paperwork to verify that pt was seen in the ED and discharged so she could get gas and mileage reimbursement. Informed pt that the discharge instructions should be good enough. This RN called insurance company and they stated that they didn't know about this. This RN signed the front of the discharge paperwork, wrote that pt was seen in the ED and discharged and circled it. Pt states that her mother is coming to pick her up.

## 2017-07-19 ENCOUNTER — TELEPHONE (OUTPATIENT)
Dept: INTERNAL MEDICINE | Facility: CLINIC | Age: 27
End: 2017-07-19

## 2017-07-19 ENCOUNTER — NURSE TRIAGE (OUTPATIENT)
Dept: NURSING | Facility: CLINIC | Age: 27
End: 2017-07-19

## 2017-07-19 NOTE — TELEPHONE ENCOUNTER
Unable to fit in today.    If active, moderate-severe symptoms recommend UC or ER. If simply ER follow-up, can schedule with me as schedule permits.

## 2017-07-19 NOTE — TELEPHONE ENCOUNTER
Reason for Call:  Same Day Appointment, Requested Provider:  Dr Ramos    PCP: Sandra Ramos    Reason for visit: back pain and chest pain when swallowing    Duration of symptoms: 2 days    Have you been treated for this in the past? No    Additional comments: Pt spoke with a [facility] nurse who told her she needed to be seen within 24 hours by her primary doctor.  The pt can only be seen by Dr Ramos per pt's insurance. Pt went to the ER 7/16 with stomach pain and vomiting after taking her daily medications.  She needs a call back asa, because she needs to arrange a ride by 5pm today 7/19/17.    Can we leave a detailed message on this number? YES    Phone number patient can be reached at: Home number on file [phone] (home)    Best Time: anytime    Call taken on 7/19/2017 at 12:39 PM by BRIAN MCCALL

## 2017-07-19 NOTE — TELEPHONE ENCOUNTER
"  Reason for Disposition    [1] Swallowing difficulty AND [2] cause unknown (Exception: difficulty swallowing is a chronic symptom)     Patient reports feeling a dull ache as she swallows food.  She states, \"I can feel it all the way down into my back.\"    Additional Information    Negative: [1] Severe difficulty swallowing (e.g., drooling or spitting) AND [2] started suddenly after taking a medicine or allergic food    Negative: Wheezing, stridor, hoarseness, or difficulty breathing    Negative: [1] Swollen tongue AND [2] sudden onset    Negative: Sounds like a life-threatening emergency to the triager    Negative: Mouth ulcers are seen    Negative: Sore throat (throat pain with swallowing)    Negative: Feeding tube, questions or concerns related to    Negative: Swallowed a (non-edible) foreign body    Negative: Swelling of tongue    Negative: [1] Symptoms of blocked esophagus (e.g., can't swallow normal secretions, drooling) AND [2] present now    Negative: Symptoms of food or bone stuck in throat or esophagus (e.g., pain in throat or chest, FB sensation, blood-tinged saliva)    Negative: [1] Severe difficulty swallowing (e.g., drooling or spitting, can't swallow water) AND [2] new onset AND [3] normal breathing    Negative: SEVERE symptoms of pill stuck in throat or esophagus (e.g., severe pain, bleeding, or inability to swallow liquids)    Negative: [1] Drinking very little AND [2] dehydration suspected (e.g., no urine > 12 hours, very dry mouth, very lightheaded)    Negative: [1] Refuses to drink anything AND [2] for > 12 hours    Negative: Patient sounds very sick or weak to the triager    Negative: Fever > 100.5 F (38.1 C)    Negative: [1] Coughing spells AND [2] occur during eating/feedings or within 2 hours    Negative: [1] Symptoms of pill stuck in throat or esophagus (e.g., pain in throat or chest, FB sensation) AND [2] no relief after using CARE ADVICE    Negative: Weak immune system (e.g., HIV positive, " cancer chemo, splenectomy, organ transplant, chronic steroids)    Protocols used: SWALLOWING DIFFICULTY-ADULT-    Advised that patient be seen within 24 hours per guideline.  Transferred caller to scheduling to obtain an appointment.    Oliva Ruvalcaba RN  [facility]

## 2017-07-19 NOTE — TELEPHONE ENCOUNTER
LM advising of info below and asked for pt to give call back if she wants to schedule for next available

## 2017-07-20 ENCOUNTER — NURSE TRIAGE (OUTPATIENT)
Dept: NURSING | Facility: CLINIC | Age: 27
End: 2017-07-20

## 2017-07-20 ENCOUNTER — HOSPITAL ENCOUNTER (OUTPATIENT)
Facility: CLINIC | Age: 27
Setting detail: OBSERVATION
Discharge: NURSING, REHAB OR LONG-TERM CARE | End: 2017-07-21
Attending: EMERGENCY MEDICINE | Admitting: INTERNAL MEDICINE
Payer: MEDICARE

## 2017-07-20 ENCOUNTER — APPOINTMENT (OUTPATIENT)
Dept: ULTRASOUND IMAGING | Facility: CLINIC | Age: 27
End: 2017-07-20
Attending: EMERGENCY MEDICINE
Payer: MEDICARE

## 2017-07-20 ENCOUNTER — TRANSFERRED RECORDS (OUTPATIENT)
Dept: HEALTH INFORMATION MANAGEMENT | Facility: CLINIC | Age: 27
End: 2017-07-20

## 2017-07-20 DIAGNOSIS — Z87.42 HISTORY OF ENDOMETRIOSIS: ICD-10-CM

## 2017-07-20 DIAGNOSIS — R11.0 NAUSEA: Primary | ICD-10-CM

## 2017-07-20 DIAGNOSIS — R10.2 PELVIC PAIN IN FEMALE: ICD-10-CM

## 2017-07-20 DIAGNOSIS — Z87.42 HISTORY OF PID: ICD-10-CM

## 2017-07-20 PROBLEM — R55 SYNCOPE: Status: ACTIVE | Noted: 2017-07-20

## 2017-07-20 LAB
ALBUMIN SERPL-MCNC: 3.9 G/DL (ref 3.4–5)
ALBUMIN UR-MCNC: NEGATIVE MG/DL
ALP SERPL-CCNC: 83 U/L (ref 40–150)
ALT SERPL W P-5'-P-CCNC: 26 U/L (ref 0–50)
ANION GAP SERPL CALCULATED.3IONS-SCNC: 8 MMOL/L (ref 3–14)
APPEARANCE UR: ABNORMAL
AST SERPL W P-5'-P-CCNC: 23 U/L (ref 0–45)
BACTERIA #/AREA URNS HPF: ABNORMAL /HPF
BASOPHILS # BLD AUTO: 0 10E9/L (ref 0–0.2)
BASOPHILS NFR BLD AUTO: 0.4 %
BILIRUB SERPL-MCNC: 0.3 MG/DL (ref 0.2–1.3)
BILIRUB UR QL STRIP: NEGATIVE
BUN SERPL-MCNC: 9 MG/DL (ref 7–30)
CALCIUM SERPL-MCNC: 9.3 MG/DL (ref 8.5–10.1)
CHLORIDE SERPL-SCNC: 109 MMOL/L (ref 94–109)
CO2 SERPL-SCNC: 24 MMOL/L (ref 20–32)
COLOR UR AUTO: ABNORMAL
CREAT SERPL-MCNC: 0.5 MG/DL (ref 0.52–1.04)
DIFFERENTIAL METHOD BLD: NORMAL
EOSINOPHIL # BLD AUTO: 0.1 10E9/L (ref 0–0.7)
EOSINOPHIL NFR BLD AUTO: 1.6 %
ERYTHROCYTE [DISTWIDTH] IN BLOOD BY AUTOMATED COUNT: 13.7 % (ref 10–15)
GFR SERPL CREATININE-BSD FRML MDRD: ABNORMAL ML/MIN/1.7M2
GLUCOSE SERPL-MCNC: 86 MG/DL (ref 70–99)
GLUCOSE UR STRIP-MCNC: NEGATIVE MG/DL
HCG SERPL QL: ABNORMAL
HCG UR QL: NEGATIVE
HCT VFR BLD AUTO: 36.2 % (ref 35–47)
HGB BLD-MCNC: 12 G/DL (ref 11.7–15.7)
HGB UR QL STRIP: ABNORMAL
IMM GRANULOCYTES # BLD: 0 10E9/L (ref 0–0.4)
IMM GRANULOCYTES NFR BLD: 0.2 %
KETONES UR STRIP-MCNC: NEGATIVE MG/DL
LACTATE BLD-SCNC: 1.5 MMOL/L (ref 0.7–2.1)
LEUKOCYTE ESTERASE UR QL STRIP: NEGATIVE
LIPASE SERPL-CCNC: 126 U/L (ref 73–393)
LYMPHOCYTES # BLD AUTO: 1.5 10E9/L (ref 0.8–5.3)
LYMPHOCYTES NFR BLD AUTO: 18.2 %
MCH RBC QN AUTO: 29.8 PG (ref 26.5–33)
MCHC RBC AUTO-ENTMCNC: 33.1 G/DL (ref 31.5–36.5)
MCV RBC AUTO: 90 FL (ref 78–100)
MONOCYTES # BLD AUTO: 0.5 10E9/L (ref 0–1.3)
MONOCYTES NFR BLD AUTO: 5.8 %
NEUTROPHILS # BLD AUTO: 5.9 10E9/L (ref 1.6–8.3)
NEUTROPHILS NFR BLD AUTO: 73.8 %
NITRATE UR QL: NEGATIVE
NRBC # BLD AUTO: 0 10*3/UL
NRBC BLD AUTO-RTO: 0 /100
PH UR STRIP: 7 PH (ref 5–7)
PLATELET # BLD AUTO: 258 10E9/L (ref 150–450)
POTASSIUM SERPL-SCNC: 4 MMOL/L (ref 3.4–5.3)
PROT SERPL-MCNC: 7.5 G/DL (ref 6.8–8.8)
RBC # BLD AUTO: 4.03 10E12/L (ref 3.8–5.2)
RBC #/AREA URNS AUTO: 4 /HPF (ref 0–2)
SODIUM SERPL-SCNC: 141 MMOL/L (ref 133–144)
SP GR UR STRIP: 1 (ref 1–1.03)
SQUAMOUS #/AREA URNS AUTO: 4 /HPF (ref 0–1)
TSH SERPL DL<=0.005 MIU/L-ACNC: 1.99 MU/L (ref 0.4–4)
URN SPEC COLLECT METH UR: ABNORMAL
UROBILINOGEN UR STRIP-MCNC: 0 MG/DL (ref 0–2)
WBC # BLD AUTO: 8 10E9/L (ref 4–11)
WBC #/AREA URNS AUTO: <1 /HPF (ref 0–2)

## 2017-07-20 PROCEDURE — 96375 TX/PRO/DX INJ NEW DRUG ADDON: CPT

## 2017-07-20 PROCEDURE — 83605 ASSAY OF LACTIC ACID: CPT | Performed by: EMERGENCY MEDICINE

## 2017-07-20 PROCEDURE — 85025 COMPLETE CBC W/AUTO DIFF WBC: CPT | Performed by: EMERGENCY MEDICINE

## 2017-07-20 PROCEDURE — 80053 COMPREHEN METABOLIC PANEL: CPT | Performed by: EMERGENCY MEDICINE

## 2017-07-20 PROCEDURE — 80307 DRUG TEST PRSMV CHEM ANLYZR: CPT | Performed by: EMERGENCY MEDICINE

## 2017-07-20 PROCEDURE — 84703 CHORIONIC GONADOTROPIN ASSAY: CPT | Performed by: EMERGENCY MEDICINE

## 2017-07-20 PROCEDURE — 93976 VASCULAR STUDY: CPT

## 2017-07-20 PROCEDURE — 96374 THER/PROPH/DIAG INJ IV PUSH: CPT

## 2017-07-20 PROCEDURE — A9270 NON-COVERED ITEM OR SERVICE: HCPCS | Mod: GY | Performed by: PHYSICIAN ASSISTANT

## 2017-07-20 PROCEDURE — 25000128 H RX IP 250 OP 636: Performed by: EMERGENCY MEDICINE

## 2017-07-20 PROCEDURE — 40000358 ZZHCL STATISTIC DRUG SCREEN MULTIPLE (METRO): Performed by: EMERGENCY MEDICINE

## 2017-07-20 PROCEDURE — 99285 EMERGENCY DEPT VISIT HI MDM: CPT | Mod: 25

## 2017-07-20 PROCEDURE — 25000132 ZZH RX MED GY IP 250 OP 250 PS 637: Mod: GY | Performed by: PHYSICIAN ASSISTANT

## 2017-07-20 PROCEDURE — 96361 HYDRATE IV INFUSION ADD-ON: CPT

## 2017-07-20 PROCEDURE — 25000128 H RX IP 250 OP 636: Performed by: PHYSICIAN ASSISTANT

## 2017-07-20 PROCEDURE — 99220 ZZC INITIAL OBSERVATION CARE,LEVL III: CPT | Performed by: PHYSICIAN ASSISTANT

## 2017-07-20 PROCEDURE — 81001 URINALYSIS AUTO W/SCOPE: CPT | Performed by: EMERGENCY MEDICINE

## 2017-07-20 PROCEDURE — A9270 NON-COVERED ITEM OR SERVICE: HCPCS | Mod: GY | Performed by: INTERNAL MEDICINE

## 2017-07-20 PROCEDURE — 84443 ASSAY THYROID STIM HORMONE: CPT | Performed by: EMERGENCY MEDICINE

## 2017-07-20 PROCEDURE — 83690 ASSAY OF LIPASE: CPT | Performed by: EMERGENCY MEDICINE

## 2017-07-20 PROCEDURE — 81025 URINE PREGNANCY TEST: CPT | Performed by: EMERGENCY MEDICINE

## 2017-07-20 PROCEDURE — 25000132 ZZH RX MED GY IP 250 OP 250 PS 637: Mod: GY | Performed by: INTERNAL MEDICINE

## 2017-07-20 PROCEDURE — G0378 HOSPITAL OBSERVATION PER HR: HCPCS

## 2017-07-20 RX ORDER — OLANZAPINE 5 MG/1
5 TABLET ORAL AT BEDTIME
Status: DISCONTINUED | OUTPATIENT
Start: 2017-07-20 | End: 2017-07-20 | Stop reason: CLARIF

## 2017-07-20 RX ORDER — PROMETHAZINE HYDROCHLORIDE 25 MG/1
25 TABLET ORAL EVERY 6 HOURS PRN
Qty: 15 TABLET | Refills: 0 | Status: SHIPPED | OUTPATIENT
Start: 2017-07-20 | End: 2017-07-21

## 2017-07-20 RX ORDER — AMOXICILLIN 250 MG
1-2 CAPSULE ORAL 2 TIMES DAILY PRN
Status: DISCONTINUED | OUTPATIENT
Start: 2017-07-20 | End: 2017-07-21 | Stop reason: HOSPADM

## 2017-07-20 RX ORDER — ACETAMINOPHEN 325 MG/1
975 TABLET ORAL EVERY 6 HOURS
Status: DISCONTINUED | OUTPATIENT
Start: 2017-07-20 | End: 2017-07-21 | Stop reason: HOSPADM

## 2017-07-20 RX ORDER — TOPIRAMATE 25 MG/1
25 TABLET, FILM COATED ORAL EVERY 24 HOURS
Status: DISCONTINUED | OUTPATIENT
Start: 2017-07-20 | End: 2017-07-21 | Stop reason: HOSPADM

## 2017-07-20 RX ORDER — CLINDAMYCIN HCL 300 MG
300 CAPSULE ORAL 4 TIMES DAILY
Status: DISCONTINUED | OUTPATIENT
Start: 2017-07-20 | End: 2017-07-21 | Stop reason: HOSPADM

## 2017-07-20 RX ORDER — DIPHENHYDRAMINE HYDROCHLORIDE 50 MG/ML
25 INJECTION INTRAMUSCULAR; INTRAVENOUS ONCE
Status: COMPLETED | OUTPATIENT
Start: 2017-07-20 | End: 2017-07-20

## 2017-07-20 RX ORDER — ONDANSETRON 4 MG/1
4 TABLET, ORALLY DISINTEGRATING ORAL EVERY 6 HOURS PRN
Status: DISCONTINUED | OUTPATIENT
Start: 2017-07-20 | End: 2017-07-21 | Stop reason: HOSPADM

## 2017-07-20 RX ORDER — KETOROLAC TROMETHAMINE 30 MG/ML
30 INJECTION, SOLUTION INTRAMUSCULAR; INTRAVENOUS EVERY 6 HOURS PRN
Status: DISCONTINUED | OUTPATIENT
Start: 2017-07-20 | End: 2017-07-21 | Stop reason: HOSPADM

## 2017-07-20 RX ORDER — PROMETHAZINE HYDROCHLORIDE 25 MG/ML
12.5 INJECTION, SOLUTION INTRAMUSCULAR; INTRAVENOUS ONCE
Status: COMPLETED | OUTPATIENT
Start: 2017-07-20 | End: 2017-07-20

## 2017-07-20 RX ORDER — KETOROLAC TROMETHAMINE 30 MG/ML
30 INJECTION, SOLUTION INTRAMUSCULAR; INTRAVENOUS ONCE
Status: COMPLETED | OUTPATIENT
Start: 2017-07-20 | End: 2017-07-20

## 2017-07-20 RX ORDER — SUMATRIPTAN 50 MG/1
50 TABLET, FILM COATED ORAL
Status: DISCONTINUED | OUTPATIENT
Start: 2017-07-20 | End: 2017-07-21 | Stop reason: HOSPADM

## 2017-07-20 RX ORDER — HYDROXYZINE HYDROCHLORIDE 25 MG/1
25 TABLET, FILM COATED ORAL EVERY 6 HOURS PRN
Status: DISCONTINUED | OUTPATIENT
Start: 2017-07-20 | End: 2017-07-21 | Stop reason: HOSPADM

## 2017-07-20 RX ORDER — ONDANSETRON 2 MG/ML
4 INJECTION INTRAMUSCULAR; INTRAVENOUS EVERY 6 HOURS PRN
Status: DISCONTINUED | OUTPATIENT
Start: 2017-07-20 | End: 2017-07-21 | Stop reason: HOSPADM

## 2017-07-20 RX ORDER — IBUPROFEN 200 MG
600 TABLET ORAL EVERY 8 HOURS PRN
Qty: 30 TABLET | Refills: 0 | Status: SHIPPED | OUTPATIENT
Start: 2017-07-20 | End: 2017-08-21

## 2017-07-20 RX ORDER — SODIUM CHLORIDE 9 MG/ML
1000 INJECTION, SOLUTION INTRAVENOUS CONTINUOUS
Status: DISCONTINUED | OUTPATIENT
Start: 2017-07-20 | End: 2017-07-20

## 2017-07-20 RX ORDER — LAMOTRIGINE 150 MG/1
150 TABLET ORAL AT BEDTIME
Status: DISCONTINUED | OUTPATIENT
Start: 2017-07-20 | End: 2017-07-20

## 2017-07-20 RX ORDER — PROCHLORPERAZINE 25 MG
25 SUPPOSITORY, RECTAL RECTAL EVERY 12 HOURS PRN
Status: DISCONTINUED | OUTPATIENT
Start: 2017-07-20 | End: 2017-07-21 | Stop reason: HOSPADM

## 2017-07-20 RX ORDER — LORAZEPAM 2 MG/ML
.5-1 INJECTION INTRAMUSCULAR EVERY 4 HOURS PRN
Status: DISCONTINUED | OUTPATIENT
Start: 2017-07-20 | End: 2017-07-21 | Stop reason: HOSPADM

## 2017-07-20 RX ORDER — LANOLIN ALCOHOL/MO/W.PET/CERES
3 CREAM (GRAM) TOPICAL
Status: DISCONTINUED | OUTPATIENT
Start: 2017-07-20 | End: 2017-07-21 | Stop reason: HOSPADM

## 2017-07-20 RX ORDER — NALOXONE HYDROCHLORIDE 0.4 MG/ML
.1-.4 INJECTION, SOLUTION INTRAMUSCULAR; INTRAVENOUS; SUBCUTANEOUS
Status: DISCONTINUED | OUTPATIENT
Start: 2017-07-20 | End: 2017-07-21 | Stop reason: HOSPADM

## 2017-07-20 RX ORDER — ACETAMINOPHEN 10 MG/ML
1000 INJECTION, SOLUTION INTRAVENOUS EVERY 6 HOURS PRN
Status: DISCONTINUED | OUTPATIENT
Start: 2017-07-20 | End: 2017-07-21 | Stop reason: HOSPADM

## 2017-07-20 RX ORDER — TOPIRAMATE 25 MG/1
25 TABLET, FILM COATED ORAL AT BEDTIME
Status: DISCONTINUED | OUTPATIENT
Start: 2017-07-20 | End: 2017-07-20

## 2017-07-20 RX ORDER — PROMETHAZINE HYDROCHLORIDE 25 MG/1
25 SUPPOSITORY RECTAL EVERY 6 HOURS PRN
Qty: 20 SUPPOSITORY | Refills: 1 | Status: SHIPPED | OUTPATIENT
Start: 2017-07-20 | End: 2017-07-21

## 2017-07-20 RX ORDER — PROCHLORPERAZINE MALEATE 5 MG
5-10 TABLET ORAL EVERY 6 HOURS PRN
Status: DISCONTINUED | OUTPATIENT
Start: 2017-07-20 | End: 2017-07-21 | Stop reason: HOSPADM

## 2017-07-20 RX ORDER — POLYETHYLENE GLYCOL 3350 17 G/17G
17 POWDER, FOR SOLUTION ORAL DAILY PRN
Status: DISCONTINUED | OUTPATIENT
Start: 2017-07-20 | End: 2017-07-21 | Stop reason: HOSPADM

## 2017-07-20 RX ORDER — DESVENLAFAXINE 100 MG/1
100 TABLET, EXTENDED RELEASE ORAL EVERY MORNING
Status: DISCONTINUED | OUTPATIENT
Start: 2017-07-21 | End: 2017-07-21 | Stop reason: HOSPADM

## 2017-07-20 RX ORDER — LIDOCAINE 40 MG/G
CREAM TOPICAL
Status: DISCONTINUED | OUTPATIENT
Start: 2017-07-20 | End: 2017-07-21 | Stop reason: HOSPADM

## 2017-07-20 RX ORDER — OLANZAPINE 5 MG/1
5 TABLET ORAL EVERY 24 HOURS
Status: DISCONTINUED | OUTPATIENT
Start: 2017-07-20 | End: 2017-07-21 | Stop reason: HOSPADM

## 2017-07-20 RX ORDER — IBUPROFEN 600 MG/1
600 TABLET, FILM COATED ORAL EVERY 6 HOURS
Status: DISCONTINUED | OUTPATIENT
Start: 2017-07-20 | End: 2017-07-21 | Stop reason: HOSPADM

## 2017-07-20 RX ORDER — SODIUM CHLORIDE 9 MG/ML
1000 INJECTION, SOLUTION INTRAVENOUS CONTINUOUS
Status: ACTIVE | OUTPATIENT
Start: 2017-07-20 | End: 2017-07-21

## 2017-07-20 RX ORDER — HYDROXYZINE HYDROCHLORIDE 50 MG/1
50 TABLET, FILM COATED ORAL EVERY 6 HOURS PRN
Status: DISCONTINUED | OUTPATIENT
Start: 2017-07-20 | End: 2017-07-21 | Stop reason: HOSPADM

## 2017-07-20 RX ORDER — LAMOTRIGINE 150 MG/1
150 TABLET ORAL EVERY 24 HOURS
Status: DISCONTINUED | OUTPATIENT
Start: 2017-07-20 | End: 2017-07-21 | Stop reason: HOSPADM

## 2017-07-20 RX ORDER — DOXYCYCLINE HYCLATE 100 MG
100 TABLET ORAL 2 TIMES DAILY
Status: DISCONTINUED | OUTPATIENT
Start: 2017-07-20 | End: 2017-07-21 | Stop reason: HOSPADM

## 2017-07-20 RX ADMIN — SODIUM CHLORIDE 1000 ML: 9 INJECTION, SOLUTION INTRAVENOUS at 19:33

## 2017-07-20 RX ADMIN — MELATONIN TAB 3 MG 3 MG: 3 TAB at 21:46

## 2017-07-20 RX ADMIN — TOPIRAMATE 25 MG: 25 TABLET, FILM COATED ORAL at 21:46

## 2017-07-20 RX ADMIN — SODIUM CHLORIDE 1000 ML: 9 INJECTION, SOLUTION INTRAVENOUS at 15:20

## 2017-07-20 RX ADMIN — PROCHLORPERAZINE EDISYLATE 10 MG: 5 INJECTION INTRAMUSCULAR; INTRAVENOUS at 21:53

## 2017-07-20 RX ADMIN — ONDANSETRON 4 MG: 2 INJECTION INTRAMUSCULAR; INTRAVENOUS at 20:44

## 2017-07-20 RX ADMIN — KETOROLAC TROMETHAMINE 30 MG: 30 INJECTION, SOLUTION INTRAMUSCULAR; INTRAVENOUS at 14:18

## 2017-07-20 RX ADMIN — CLINDAMYCIN HYDROCHLORIDE 300 MG: 300 CAPSULE ORAL at 21:46

## 2017-07-20 RX ADMIN — SODIUM CHLORIDE 1000 ML: 9 INJECTION, SOLUTION INTRAVENOUS at 14:17

## 2017-07-20 RX ADMIN — DIPHENHYDRAMINE HYDROCHLORIDE 25 MG: 50 INJECTION, SOLUTION INTRAMUSCULAR; INTRAVENOUS at 14:19

## 2017-07-20 RX ADMIN — PROMETHAZINE HYDROCHLORIDE 12.5 MG: 25 INJECTION INTRAMUSCULAR; INTRAVENOUS at 14:19

## 2017-07-20 RX ADMIN — DOXYCYCLINE HYCLATE 100 MG: 100 TABLET ORAL at 21:46

## 2017-07-20 RX ADMIN — LAMOTRIGINE 150 MG: 150 TABLET ORAL at 21:46

## 2017-07-20 ASSESSMENT — PAIN DESCRIPTION - DESCRIPTORS: DESCRIPTORS: DISCOMFORT

## 2017-07-20 ASSESSMENT — ENCOUNTER SYMPTOMS
ABDOMINAL PAIN: 1
VOMITING: 1
TROUBLE SWALLOWING: 1
NAUSEA: 1

## 2017-07-20 NOTE — TELEPHONE ENCOUNTER
Reason for Disposition    [1] Swallowing difficulty AND [2] cause unknown (Exception: difficulty swallowing is a chronic symptom)    Additional Information    Negative: [1] Severe difficulty swallowing (e.g., drooling or spitting) AND [2] started suddenly after taking a medicine or allergic food    Negative: Wheezing, stridor, hoarseness, or difficulty breathing    Negative: [1] Swollen tongue AND [2] sudden onset    Negative: Sounds like a life-threatening emergency to the triager    Negative: Mouth ulcers are seen    Negative: Sore throat (throat pain with swallowing)    Negative: Swallowed a (non-edible) foreign body    Negative: Feeding tube, questions or concerns related to    Negative: Swelling of tongue    Negative: [1] Symptoms of blocked esophagus (e.g., can't swallow normal secretions, drooling) AND [2] present now    Negative: Symptoms of food or bone stuck in throat or esophagus (e.g., pain in throat or chest, FB sensation, blood-tinged saliva)    Negative: [1] Severe difficulty swallowing (e.g., drooling or spitting, can't swallow water) AND [2] new onset AND [3] normal breathing    Negative: SEVERE symptoms of pill stuck in throat or esophagus (e.g., severe pain, bleeding, or inability to swallow liquids)    Negative: [1] Drinking very little AND [2] dehydration suspected (e.g., no urine > 12 hours, very dry mouth, very lightheaded)    Negative: [1] Refuses to drink anything AND [2] for > 12 hours    Negative: Patient sounds very sick or weak to the triager    Negative: Fever > 100.5 F (38.1 C)    Negative: [1] Coughing spells AND [2] occur during eating/feedings or within 2 hours    Negative: [1] Symptoms of pill stuck in throat or esophagus (e.g., pain in throat or chest, FB sensation) AND [2] no relief after using CARE ADVICE    Negative: Weak immune system (e.g., HIV positive, cancer chemo, splenectomy, organ transplant, chronic steroids)    Protocols used: SWALLOWING  DIFFICULTY-ADULT-AH    States she's unable to get in to see her primary MD, she tried that yesterday. Her insurance will only allow her to see her primary MD.  So, I advised, if she has the ability to make the decision, she should go to urgent care or the ER. She states she thinks her insurance will allow her to do that. She will find a ride to urgent care. I encouraged her to keep trying to take sips of water to stay hydrated.  Brenda Foster RN-[facility]

## 2017-07-20 NOTE — IP AVS SNAPSHOT
[facility] Observation Department    [address]    [facility] [address]    Phone:  [phone]                                       After Visit Summary   7/20/2017    Nevin Alvarado    MRN: [id number]           After Visit Summary Signature Page     I have received my discharge instructions, and my questions have been answered. I have discussed any challenges I see with this plan with the nurse or doctor.    ..........................................................................................................................................  Patient/Patient Representative Signature      ..........................................................................................................................................  Patient Representative Print Name and Relationship to Patient    ..................................................               ................................................  Date                                            Time    ..........................................................................................................................................  Reviewed by Signature/Title    ...................................................              ..............................................  Date                                                            Time

## 2017-07-20 NOTE — H&P
"History and Physical     Nevin Alvarado MRN# [id number]   YOB: 1990 Age: 26 year old      Date of Admission:  7/20/2017    Primary care provider: Sandra Ramos          Assessment and Plan:   Nevin Alvarado is a 26 year old female with a PMH significant for endometriosis, borderline personality disorder, depression, anxiety, PTSD, history of self-harm behaviors and foreign body ingestion, history of migraine without aura who presents from [facility] with abdominal pain, nausea, and vomiting. After interventions in the ED she was feeling better, but then after she urinated she had an unwitnessed syncopal episode and observation bed was requested for IV fluid hydration and monitoring overnight.    1. Unwitnessed syncopal episode: Patient reports she urinated in the bathroom in the ED and then when she came back to her room she felt lightheaded and that was the last thing she remembered before passing out. She was found down by staff lying flat on her back on the floor \"fluttering her eyes.\" Vitals obtained a couple minutes later per ED physician were stable. May have had micturition-related vasovagal response along with some sedation from receiving IV phenergan and benadryl.  -Obtain orthostatics.  -IV fluid hydration overnight.  -Monitor on telemetry.  -Will low suspicion for cardiac process, no echo ordered.   -Will discontinue IV benadryl and not order narcotics to avoid further syncope.  -Ambulate in hallway tomorrow AM.    2. Acute on chronic lower abdominal pain with nausea and vomiting: Patient reports she started vomiting 4-5 days ago. Pain has been going on for at least a month. She suspects related to her endometriosis. She reports she has a planned outpatient appointment with her OBGYN Dr. Marianna Ramirez on 7/25/17. ED provider arranged appointment with Dr. Alfaro next week 7/27/17. It would be appropriate for her to follow up with Dr. Ramirez who has seen her in " the past. Pelvic ultrasound shows IUD in appropriate place and left ovarian cyst measuring 4.1 cm (smaller than 6 days ago when it measured 4.5 cm), no sonographic evidence of ovarian torsion. XR of abdomen 7/16 negative. XR of abdomen on 7/14 and RUQ abdominal u/s negative as well. CT scan of pelvis 7/11 and pelvic ultrasound 7/11 negative for acute pathology. She had 10 serial abdominal x-rays last month alone following ingestion of a metallic spring from a pen.  -ADAT, starting with clear liquid diet.  -Multimodal pain regimen: Scheduled tylenol, ibuprofen, prn atarax, prn icy hot, prn toradol and ofirmev for IV options of patient not tolerating po. Will avoid narcotics in setting of syncope as it could suppress her breathing or cause hypotensive event. Also discussed that IV benadryl will be discontinued as she had this prior to her syncopal episode and it likely sedated her. Discussed this plan with the patient and nurse in room together.   -Continue PTA doxycycline 100 mg BID and clindamycin 300 mg QID for PID treatment. Patient reports she has missed several doses due to her multiple ER visits, so unclear how much she has left.  -Confirm appointment is set with Dr. Ramirez for the 25th, then could cancel appointment.    3. Borderline personality disorder, depression, anxiety, PTSD: Continue PTA zyprexa 5 mg HS, lamictal 150 mg HS, pristiq 100 mg daily.    4. Hx of migraine headaches: Continue topamax 25 mg HS. Prn sumatriptan available.    Prophylaxis - None, encourage ambulation with anticipated short stay  Code status - Full Code  Dispo - Anticipate < 2 evening stay              Chief Complaint:   syncope         History of Present Illness:   Nevin Alvarado is a 26 year old female with a PMH significant for endometriosis, borderline personality disorder, depression, anxiety, PTSD, history of self-harm behaviors and foreign body ingestion, history of migraine without aura who presented from [facility] with abdominal pain, nausea, and vomiting.    She reports that for the past month she has been having abdominal pain that she believes to be consistent with her history of endometriosis.  On chart review it appears she has a history of grade 1 endometriosis, per ED note from 7/11/17 it is unlikely that the endometriosis is the cause of her chronic pain, but if it was NSAIDs and ibuprofen were recommended. She has been seen in multiple ERs for the past few months. She was seen at [facility] ED 7/8/17 when she needed to have a tampon removed after the string broke off. She then returned with abdominal pain 7/11/17. A CT of the abdomen and a pelvic ultrasound were performed 7/11/17 with no acute pathoogy. Pelvic ultrasound repeated 7/14/17 also negative or acute pathology. She presented again 7/16/17 with continued periumbilical abdominal pain along with nausea and vomiting. She was told during her ER visit 7/11 her pain was possibly from PID and was started on doxycycline and clindamycin. Chlamydia and gonorrhea tests came back negative. She was also prescribed diflucan to take after completing the antibiotic course and sent with a script for oxycodone, instructed to follow up with her PCP.  Since that time she has continued to have abdominal pain. She has not had the opportunity to follow up with her PCP because she is not available. She reports having trouble keeping any food down, describing swallowing anything as \"feeling like a rock.\" No ingested foreign body reported. She has an IUD in place since 2015 and is not supposed to get her period but she says she has been having vaginal bleeding for the past month. Denies fever, endorses chills. She reports she has an appointment for next week scheduled with her OBGYN Dr[facility]. She went to the [facility] today and they referred her to the ER.     Upon arrival in the ED, VSS. ED workup revealed: CBC w/ diff, " CMP, lipase, and lactic acid unremarkable. She is not pregnant. Her UA has some blood in it but o/w unremarkable. Pelvic ultrasound was repeated showing IUD in expected location and left ovarian simple cyst 4.1 cm in diameter smaller than exam a week ago. She got 1 L IV NS, 30 mg IV toradol, 24 mg IV benadryl, and 12.5 mg IV promethazine in the ER with improvement in her symptoms. ER spoke w/ Dr. Alfaro regarding the patient who agreed to see the patient in the clinic on 7/27/17 at 11:30 AM. However, it sounds like she has an appointment with her regular OBGYN a day or two earlier.    The patient was apparently feeling well enough to discharge. However, the patient reports she got up to use the bathroom, and then after she urinated in the bathroom she felt lightheaded and walked back into her room.  From that point it is unclear what happened as it was an unwitnessed event. The patient was found by nursing staff lying flat on her back on the floor with her eyelids fluttering. Patient was responsive to voice and helped back into bed. Vital signs were obtained a few minutes later and were stable. Dr. Cabrera requested an observation bed for further IV fluid hydration and observation overnight.    History was obtained by speaking with the Emergency Room physician, chart review and interview with the patient.             Past Medical History:     Past Medical History:   Diagnosis Date     ADD (attention deficit disorder)      Anorexia nervosa with bulimia     history of; on Topamax     Anxiety      Borderline personality disorder      Depression      H/O self-harm      H/O swallowed foreign body     Recurrent issue     Lives in independent group home     due to debilitating mental illness     Migraine without aura     no known triggers; on Topamax bid and Imitrex PRN     Morbid obesity (H)      PTSD (post-traumatic stress disorder)      Rectal foreign body     Recurrent issue               Past Surgical History:     Past  Surgical History:   Procedure Laterality Date     ESOPHAGOSCOPY, GASTROSCOPY, DUODENOSCOPY (EGD), COMBINED N/A 3/9/2017    Procedure: COMBINED ESOPHAGOSCOPY, GASTROSCOPY, DUODENOSCOPY (EGD), REMOVE FOREIGN BODY;  Surgeon: Avis Guzmán MD;  Location: [facility] OR     ESOPHAGOSCOPY, GASTROSCOPY, DUODENOSCOPY (EGD), COMBINED N/A 4/20/2017    Procedure: COMBINED ESOPHAGOSCOPY, GASTROSCOPY, DUODENOSCOPY (EGD), REMOVE FOREIGN BODY;  EGD removal Foregin body;  Surgeon: Lokesh Paula MD;  Location: [facility] OR     ESOPHAGOSCOPY, GASTROSCOPY, DUODENOSCOPY (EGD), COMBINED N/A 6/12/2017    Procedure: COMBINED ESOPHAGOSCOPY, GASTROSCOPY, DUODENOSCOPY (EGD);  COMBINED ESOPHAGOSCOPY, GASTROSCOPY, DUODENOSCOPY (EGD) [[id number]]attempted removal of foreign body;  Surgeon: Pamela Perez MD;  Location: [facility] OR     ESOPHAGOSCOPY, GASTROSCOPY, DUODENOSCOPY (EGD), COMBINED N/A 6/9/2017    Procedure: COMBINED ESOPHAGOSCOPY, GASTROSCOPY, DUODENOSCOPY (EGD), REMOVE FOREIGN BODY;  Esophagoscopy, Gastroscopy, Duodenoscopy, Removal of Foreign Body;  Surgeon: Dejon Marsh MD;  Location: [facility] OR     EXAM UNDER ANESTHESIA ANUS N/A 1/10/2017    Procedure: EXAM UNDER ANESTHESIA ANUS;  Surgeon: Annmarie Haynes MD;  Location: [facility] OR     HC REMOVE FECAL IMPACTION OR FB W ANESTHESIA N/A 12/18/2016    Procedure: REMOVE FECAL IMPACTION/FOREIGN BODY UNDER ANESTHESIA;  Surgeon: Soham Cano MD;  Location: [facility] OR     KNEE SURGERY Right     removed a small tissue mass from knee     LAPAROSCOPIC ABLATION ENDOMETRIOSIS       LAPAROTOMY EXPLORATORY N/A 1/10/2017    Procedure: LAPAROTOMY EXPLORATORY;  Surgeon: Annmarie Haynes MD;  Location: [facility] OR     lymph nodes removed from neck; benign  age 7     MAMMOPLASTY REDUCTION Bilateral      RELEASE CARPAL TUNNEL Bilateral      SIGMOIDOSCOPY FLEXIBLE N/A 1/10/2017    Procedure: SIGMOIDOSCOPY FLEXIBLE;  Surgeon: Annmarie Haynes MD;  Location: [facility] OR                Social History:     Social History     Social History     Marital status: Single     Spouse name: N/A     Number of children: N/A     Years of education: N/A     Occupational History     On disability      Social History Main Topics     Smoking status: Never Smoker     Smokeless tobacco: Never Used     Alcohol use No     Drug use: No     Sexual activity: No      Comment: IUD - Mirena - placed July, 2015     Other Topics Concern     Not on file     Social History Narrative    Single.    Living in independent living portion of [employer] [facility].    On disability.    No regular exercise.                Family History:     Family History   Problem Relation Age of Onset     Type 2 Diabetes Maternal Grandmother      Type 2 Diabetes Paternal Grandmother      Breast Cancer Paternal Grandmother      CEREBROVASCULAR DISEASE Father 53     Myocardial Infarction No family hx of      Coronary Artery Disease Early Onset No family hx of      Ovarian Cancer No family hx of      Colon Cancer No family hx of       Family history reviewed and noncontributory.         Allergies:      Allergies   Allergen Reactions     Augmentin Swelling     Blood-Group Specific Substance Other (See Comments)     Patient has an anti-Cw and non-specific antibodies. Blood product orders may be delayed. Draw one red top and two purple top tubes for all type/screen/crossmatch orders.     Hydrocodone Nausea and Vomiting     vomiting for days     Influenza Vaccines Other (See Comments)     Oseltamivir Hives     med stopped, PN: med stopped     Vicodin [Hydrocodone-Acetaminophen] Nausea and Vomiting     Cefazolin Rash     Cephalosporins Rash               Medications:     Prior to Admission medications    Medication Sig Last Dose Taking? Auth Provider   promethazine (PHENERGAN) 25 MG tablet Take 1 tablet (25 mg) by mouth every 6 hours as needed for nausea  Yes Erich Cabrera MD   promethazine (PHENERGAN) 25 MG Suppository Place 1 suppository (25 mg)  rectally every 6 hours as needed for nausea  Yes Erich Cabrera MD   ibuprofen (ADVIL/MOTRIN) 200 MG tablet Take 3 tablets (600 mg) by mouth every 8 hours as needed for mild pain  Yes Erich Cabrera MD   OLANZapine (ZYPREXA) 5 MG tablet Take 1 tablet (5 mg) orally at bedtime prn   Alyssa Menon MD   prochlorperazine (COMPAZINE) 5 MG tablet Take 1 tablet (5 mg) by mouth every 6 hours as needed for nausea or vomiting   Rhina Reyes MD   clindamycin (CLEOCIN) 300 MG capsule Take 1 capsule (300 mg) by mouth 4 times daily for 10 days   Bruce Pryor MD   doxycycline (VIBRAMYCIN) 100 MG capsule Take 1 capsule (100 mg) by mouth 2 times daily for 10 days   Bruce Pryor MD   oxyCODONE (ROXICODONE) 5 MG IR tablet Take 1 tablet (5 mg) by mouth every 6 hours as needed for pain   Bruce Pryor MD   fluconazole (DIFLUCAN) 150 MG tablet Take one tablet in ten days (when done with antibiotics)   Bruce Pryor MD   docosanol (ABREVA) 10 % CREA cream Apply topically 5 times daily As needed   Sandra Ramos MD   topiramate (TOPAMAX) 50 MG tablet Take 1 tablet (50 mg) by mouth 2 times daily   Sandra Ramso MD   ondansetron (ZOFRAN ODT) 4 MG ODT tab Take 1-2 tablets (4-8 mg) by mouth every 8 hours as needed for nausea   Sandra Ramos MD   lamoTRIgine (LAMICTAL) 100 MG tablet Take 1 tablet (100 mg) by mouth daily   AndKodi browne MD   acetaminophen (TYLENOL) 500 MG tablet Take 1-2 tablets (500-1,000 mg) by mouth every 8 hours as needed for mild pain   Sandra Ramos MD   augmented betamethasone dipropionate (DIPROLENE-AF) 0.05 % ointment Apply to AA BID x 2-3 weeks then PRN only   Carli Thurman, REUBEN   levonorgestrel (MIRENA) 20 MCG/24HR IUD 1 each (20 mcg) by Intrauterine route once for 1 dose   Sandra Ramos MD   albuterol (ALBUTEROL) 108 (90 BASE) MCG/ACT Inhaler Inhale 2 puffs into the lungs every 4 hours as needed for shortness of breath /  dyspnea   Siddharth Soler MD   SUMATRIPTAN SUCCINATE PO Take 50 mg by mouth once as needed for migraine Reported on 5/19/2017   Unknown, Entered By History   Desvenlafaxine Succinate (PRISTIQ PO) Take 100 mg by mouth daily   Reported, Patient   Cholecalciferol (VITAMIN D3 PO) Take 5,000 Units by mouth daily    Reported, Patient              Review of Systems:   A Comprehensive greater than 10 system review of systems was carried out.  Pertinent positives and negatives are noted above.  Otherwise negative for contributory information.          Physical Exam:   Blood pressure (!) 151/99, pulse 85, temperature 98  F (36.7  C), temperature source Oral, resp. rate 18, weight 102.5 kg (226 lb), last menstrual period 07/16/2017, SpO2 100 %, not currently breastfeeding.  Exam:  GENERAL: Comfortable. Lying under bear-hugger.  PSYCH: Flat affect, oriented, no acute distress.  HEENT:  PERRLA. Normal conjunctiva, hearing intact. Normal nasal mucosa. Oropharynx pink and moist.  NECK:  Supple, no neck vein distention, adenopathy or bruits, normal thyroid.  HEART:  Normal S1, S2 with no murmur, no pericardial rub, gallops or S3 or S4.  LUNGS:  Clear to auscultation, normal respiratory effort. No wheezing, rales, or ronchi.  ABDOMEN:  Soft, no hepatosplenomegaly, normal bowel sounds. Non-tender, non distended.    EXTREMITIES:  No pedal edema, +2 pulses bilateral and equal.  SKIN:  Dry to touch. No rash, wound or ulcerations.  NEUROLOGIC:  CN 2-12 intact, BL 5/5 symmetric upper and lower extremity strength, sensation is intact with no focal deficits.            Data:     Results for orders placed or performed during the hospital encounter of 07/20/17   US Pelvic Complete w Transvaginal & Abd/Pel Duplex Limited    Narrative    ULTRASOUND PELVIS COMPLETE WITH TRANSVAGINAL AND DOPPLER LIMITED  IMAGING  7/20/2017 1:52 PM    HISTORY: Pelvic pain.     TECHNIQUE: Transabdominal images of the pelvis are supplemented with  endovaginal images  to better define anatomy.    COMPARISON: 7/14/2017.    FINDINGS: The uterus measures 8.5 x 4.1 x 3.3 cm. Endometrial stripe  thickness is 0.6 cm. Again seen is an intrauterine device in the  expected location.    The right ovary is unremarkable. The left ovary contains a 4.1 x 3.4 x  2.7 cm simple cyst. This is slightly smaller than previously seen left  ovarian cyst. Color flow imaging and Doppler waveform analysis show no  evidence for ovarian torsion bilaterally. No free fluid. No abnormal  adnexal mass lesions.      Impression    IMPRESSION:   1. Intrauterine device in expected location.  2. Left ovarian simple cyst measuring up to 4.1 cm, slightly smaller  than on the previous exam six days earlier.    VERNELL SCHMIDT MD   Comprehensive metabolic panel   Result Value Ref Range    Sodium 141 133 - 144 mmol/L    Potassium 4.0 3.4 - 5.3 mmol/L    Chloride 109 94 - 109 mmol/L    Carbon Dioxide 24 20 - 32 mmol/L    Anion Gap 8 3 - 14 mmol/L    Glucose 86 70 - 99 mg/dL    Urea Nitrogen 9 7 - 30 mg/dL    Creatinine 0.50 (L) 0.52 - 1.04 mg/dL    GFR Estimate >90  Non  GFR Calc   >60 mL/min/1.7m2    GFR Estimate If Black >90   GFR Calc   >60 mL/min/1.7m2    Calcium 9.3 8.5 - 10.1 mg/dL    Bilirubin Total 0.3 0.2 - 1.3 mg/dL    Albumin 3.9 3.4 - 5.0 g/dL    Protein Total 7.5 6.8 - 8.8 g/dL    Alkaline Phosphatase 83 40 - 150 U/L    ALT 26 0 - 50 U/L    AST 23 0 - 45 U/L   Lipase   Result Value Ref Range    Lipase 126 73 - 393 U/L   HCG QUALitative   Result Value Ref Range    HCG Qualitative Serum (A) NEG     Canceled, Test credited   Unsatisfactory specimen - hemolyzed     UA with Microscopic   Result Value Ref Range    Color Urine Straw     Appearance Urine Slightly Cloudy     Glucose Urine Negative NEG mg/dL    Bilirubin Urine Negative NEG    Ketones Urine Negative NEG mg/dL    Specific Gravity Urine 1.005 1.003 - 1.035    Blood Urine Moderate (A) NEG    pH Urine 7.0 5.0 - 7.0 pH     Protein Albumin Urine Negative NEG mg/dL    Urobilinogen mg/dL 0.0 0.0 - 2.0 mg/dL    Nitrite Urine Negative NEG    Leukocyte Esterase Urine Negative NEG    Source Midstream Urine     WBC Urine <1 0 - 2 /HPF    RBC Urine 4 (H) 0 - 2 /HPF    Bacteria Urine Few (A) NEG /HPF    Squamous Epithelial /HPF Urine 4 (H) 0 - 1 /HPF   Lactic acid whole blood   Result Value Ref Range    Lactic Acid 1.5 0.7 - 2.1 mmol/L   CBC with platelets differential   Result Value Ref Range    WBC 8.0 4.0 - 11.0 10e9/L    RBC Count 4.03 3.8 - 5.2 10e12/L    Hemoglobin 12.0 11.7 - 15.7 g/dL    Hematocrit 36.2 35.0 - 47.0 %    MCV 90 78 - 100 fl    MCH 29.8 26.5 - 33.0 pg    MCHC 33.1 31.5 - 36.5 g/dL    RDW 13.7 10.0 - 15.0 %    Platelet Count 258 150 - 450 10e9/L    Diff Method Automated Method     % Neutrophils 73.8 %    % Lymphocytes 18.2 %    % Monocytes 5.8 %    % Eosinophils 1.6 %    % Basophils 0.4 %    % Immature Granulocytes 0.2 %    Nucleated RBCs 0 0 /100    Absolute Neutrophil 5.9 1.6 - 8.3 10e9/L    Absolute Lymphocytes 1.5 0.8 - 5.3 10e9/L    Absolute Monocytes 0.5 0.0 - 1.3 10e9/L    Absolute Eosinophils 0.1 0.0 - 0.7 10e9/L    Absolute Basophils 0.0 0.0 - 0.2 10e9/L    Abs Immature Granulocytes 0.0 0 - 0.4 10e9/L    Absolute Nucleated RBC 0.0    HCG qualitative urine   Result Value Ref Range    HCG Qual Urine Negative NEG       Mavis Sanchez PA-C

## 2017-07-20 NOTE — PROGRESS NOTES
ROOM # 225    Living Situation (if not independent, order SW consult):Apt with roommate  Facility name:NA  : Mother    Activity level at baseline: IND  Activity level on admit: SBA      Patient registered to observation; given Patient Bill of Rights; given the opportunity to ask questions about observation status and their plan of care.  Patient has been oriented to the observation room, bathroom and call light is in place.    Discussed discharge goals and expectations with patient/family.

## 2017-07-20 NOTE — ED NOTES
"Pt here with abdominal pain and n/v for the past 2 weeks. Pt was sent from [facility]  today. Pt has been seen at [facility] \"a couple times\" for the same symptoms. Pt has been trying to get in to see her PCP but her PCP has been unavailable.   "

## 2017-07-20 NOTE — IP AVS SNAPSHOT
MRN:[id number]                      After Visit Summary   7/20/2017    Nevin Alvarado    MRN: [id number]           Thank you!     Thank you for choosing [facility] for your care. Our goal is always to provide you with excellent care. Hearing back from our patients is one way we can continue to improve our services. Please take a few minutes to complete the written survey that you may receive in the mail after you visit. If you would like to speak to someone directly about your visit please contact Patient Relations at [phone]. Thank you!          Patient Information     Date Of Birth          1990        About your hospital stay     You were admitted on:  July 20, 2017 You last received care in the:  [facility] Observation Department    You were discharged on:  July 21, 2017        Reason for your hospital stay       You were admitted for lightheadedness after receiving IV medications in the ER for nausea.  You were given IVF and supportive care and improved.  You will need close outpt f/u with your OB/GYN, as scheduled, on 7/25/17 to follow-up on your endometriosis and chronic abdominal pain.                  Who to Call     For medical emergencies, please call 911.  For non-urgent questions about your medical care, please call your primary care provider or clinic, [phone]          Attending Provider     Provider Specialty    Erich Cabrera MD Emergency Medicine    Yuniel Arana MD Emergency Medicine    Kamilla Barahona DO Internal Medicine       Primary Care Provider Office Phone # Fax #    Sandra Ramos [phone]      After Care Instructions     Activity       Your activity upon discharge: activity as tolerated and no driving for today            Diet       Follow this diet upon discharge:  Resume home diet                  Follow-up Appointments     Follow-up and recommended labs and tests        F/U  with Dr. Kat, OB/GYN, for f/u on 7/25/17 as previously scheduled.                  Your next 10 appointments already scheduled     Jul 27, 2017 11:30 AM CDT   SHORT with Sandra Alfaro MD   [facility] ([facility]    [address]  [facility] [address]   [phone]            Aug 02, 2017  1:45 PM CDT   New Patient Visit with Mariama Mcallister MD   [facility] ([facility])    [facility] [address]  [address] [facility] [id number] [phone]               Follow-up Information     Follow up with Sandra Alfaro MD In 5 days.    Specialty:  OB/Gyn    Contact information:    [facility] [address] [facility]  [address]  [facility] [address]  [phone]                  Further instructions from your care team       Continue present clindamycin and doxycycline until finished.  Discharge Instructions  Vomiting    You have been seen today for vomiting. This is usually caused by a virus, but some bacteria, parasites, medicines or other medical conditions can cause similar symptoms. At this time your doctor does not find that your vomiting is a sign of anything dangerous or life-threatening. However, sometimes the signs of serious illness do not show up right away. If you have new or worse symptoms, you may need to be seen again in the emergency department or by your primary doctor. Remember that serious problems like appendicitis can start as vomiting.     Return to the Emergency Department if:    You keep throwing up and you are not able to keep liquids down.     You feel you are getting dehydrated, such as being very thirsty, not urinating at least every 8-12 hours, or feeling faint or lightheaded.     You develop a new fever, or your fever continues for more than 2 days.     You have belly pain that seems worse than cramps, is in one spot, or is getting worse over time.     You have blood in your vomit  or stools.     You feel very weak.    You are not starting to improve within 24 hours of your visit here.     What can I do to help myself?    The most important thing to do is to drink clear liquids. If you have been vomiting a lot, it is best to have only small, frequent sips of liquids. Drinking too much at once may cause more vomiting. If you are vomiting often, you must replace minerals, sodium and potassium lost with your illness. Pedialyte  and sports drinks can help you replace these minerals. You can also drink clear liquids such as water, weak tea, apple juice, and 7-Up . Avoid acid liquids (orange), caffeine (coffee) or alcohol. Do not drink milk until you no longer have diarrhea.     After liquids are staying down, you may start eating mild foods. Soda crackers, toast, plain noodles, gelatin, applesauce and bananas are good first choices. Avoid foods that have acid, are spicy, fatty or have a lot of fiber (such as meats, coarse grains, vegetables). You may start eating these foods again in about 3 days when you are better.     Sometimes treatment includes prescription medicine to prevent nausea and vomiting. If your doctor prescribes these for you, take them as directed.     Don t take ibuprofen, or other nonsteroidal anti-inflammatory medicines without checking with your healthcare provider.   If you were given a prescription for medicine here today, be sure to read all of the information (including the package insert) that comes with your prescription.  This will include important information about the medicine, its side effects, and any warnings that you need to know about.  The pharmacist who fills the prescription can provide more information and answer questions you may have about the medicine.  If you have questions or concerns that the pharmacist cannot address, please call or return to the Emergency Department.       Opioid Medication Information    Pain medications are among the most commonly  prescribed medicines, so we are including this information for all our patients. If you did not receive pain medication or get a prescription for pain medicine, you can ignore it.     You may have been given a prescription for an opioid (narcotic) pain medicine and/or have received a pain medicine while here in the Emergency Department. These medicines can make you drowsy or impaired. You must not drive, operate dangerous equipment, or engage in any other dangerous activities while taking these medications. If you drive while taking these medications, you could be arrested for DUI, or driving under the influence. Do not drink any alcohol while you are taking these medications.     Opioid pain medications can cause addiction. If you have a history of chemical dependency of any type, you are at a higher risk of becoming addicted to pain medications.  Only take these prescribed medications to treat your pain when all other options have been tried. Take it for as short a time and as few doses as possible. Store your pain pills in a secure place, as they are frequently stolen and provide a dangerous opportunity for children or visitors in your house to start abusing these powerful medications. We will not replace any lost or stolen medicine.  As soon as your pain is better, you should flush all your remaining medication.     Many prescription pain medications contain Tylenol  (acetaminophen), including Vicodin , Tylenol #3 , Norco , Lortab , and Percocet .  You should not take any extra pills of Tylenol  if you are using these prescription medications or you can get very sick.  Do not ever take more than 3000 mg of acetaminophen in any 24 hour period.    All opioids tend to cause constipation. Drink plenty of water and eat foods that have a lot of fiber, such as fruits, vegetables, prune juice, apple juice and high fiber cereal.  Take a laxative if you don t move your bowels at least every other day. Miralax , Milk of  Magnesia, Colace , or Senna  can be used to keep you regular.      Remember that you can always come back to the Emergency Department if you are not able to see your regular doctor in the amount of time listed above, if you get any new symptoms, or if there is anything that worries you  Endometriosis    Endometriosis is a condition that occurs when the tissue that lines the uterus begins to grow where it should not. The tissue may grow on the outside surface of the uterus, the ovaries, or fallopian tubes. It may also grow on the bowel, rectum, bladder, or other parts. The tissue responds to the same hormones that control your menstrual cycle. So it may swell and bleed just like the lining of the uterus, which is shed every month during your period. The swelling and blood irritate nearby tissues. This causes pelvic or lower abdominal (belly) pain and cramping. The pain is often worse around the time of your period. Pain during sex is also common. Some women have pain during bowel movements or urination. Over time, scar tissue may form. This scar tissue can bind organs together. It can also cause problems getting pregnant (infertility).  The exact cause of endometriosis is unknown. The best way to confirm a diagnosis is with a procedure called laparoscopy. This allows the provider to look inside the abdomen with a small tube and light.  Treatment depends on your symptoms. If pain is mild, no treatment may be needed. If pain is more severe, medicines may be advised. Surgery may also be an option. Your provider can tell you more as needed.  Home care  Medicines  To help manage endometriosis, any of the following medicines may be used:    Pain relievers, such as non-steroidal anti-inflammatory drugs (NSAIDS)    Hormonal medicines    Birth control pills    Progestin-only pills    Gonadotropin-releasing hormone (GnRH) agonists    Danazol  If you re prescribed medicines, be sure to take them as directed. Also, report any  side effects you have to your provider.  General care    Rest as needed when you have symptoms.    To help ease pain or cramping, apply a heating pad to where the pain is. You may also use a hot water bottle.  Follow-up care  Follow up with your healthcare provider, or as directed.  When to seek medical advice  Call your healthcare provider right away if any of these occur:    Fever of 100.4 F (38 C) or higher, or as directed by your provider    Abdominal or pelvic pain or cramping worsens, or doesn t improve with medicines    Pain during sex, urination, or bowel movements continues    Heavy bleeding (soaking 1 pad or tampon every hour for 3 hours)    Missed periods (if they are usually regular)    Sudden, severe pain in the abdomen    Abdominal swelling with nausea or vomiting    Blood in the urine or problems urinating    Blood in the stool    Dizziness, weakness, fainting  Note: If you have been unable to get pregnant after trying for 1 year, see your provider. He or she can talk with you about infertility testing.   Date Last Reviewed: 6/11/2015 2000-2017 [employer]. [address] [place] [address]. All rights reserved. This information is not intended as a substitute for professional medical care. Always follow your healthcare professional's instructions.          Pending Results     Date and Time Order Name Status Description    7/20/2017 1255 Drug screen urine In process             Statement of Approval     Ordered          07/21/17 1007  I have reviewed and agree with all the recommendations and orders detailed in this document.  EFFECTIVE NOW     Approved and electronically signed by:  Kamilla Barahona DO             Admission Information     Date & Time Provider Department Dept. Phone    7/20/2017 Kamilla Barahona,  [facility] Observation Department [phone]      Your Vitals Were     Blood Pressure Pulse Temperature Respirations Height  "Weight    151/85 (BP Location: Right arm) 87 97.5  F (36.4  C) (Oral) 16 1.626 m (5' 4\") 104.9 kg (231 lb 4.8 oz)    Last Period Pulse Oximetry BMI (Body Mass Index)             07/16/2017 100% 39.7 kg/m2         [employer] Information     [employer] gives you secure access to your electronic health record. If you see a primary care provider, you can also send messages to your care team and make appointments. If you have questions, please call your primary care clinic.  If you do not have a primary care provider, please call [phone] and they will assist you.        Care EveryWhere ID     This is your Care EveryWhere ID. This could be used by other organizations to access your [place] medical records  [id number]        Equal Access to Services     ZENON DIAMOND : Gabriel Collado, erick singh, mic brannon. So [facility] [phone].    ATENCIÓN: Si habla español, tiene a singh disposición servicios gratuitos de asistencia lingüística. Dylon al [phone].    We comply with applicable federal civil rights laws and [place] laws. We do not discriminate on the basis of race, color, national origin, age, disability sex, sexual orientation or gender identity.               Review of your medicines      START taking        Dose / Directions    * promethazine 25 MG tablet   Commonly known as:  PHENERGAN        Dose:  25 mg   Take 1 tablet (25 mg) by mouth every 6 hours as needed for nausea   Quantity:  15 tablet   Refills:  0       * promethazine 25 MG Suppository   Commonly known as:  PHENERGAN        Dose:  25 mg   Place 1 suppository (25 mg) rectally every 6 hours as needed for nausea   Quantity:  20 suppository   Refills:  1       * Notice:  This list has 2 medication(s) that are the same as other medications prescribed for you. Read the directions carefully, and ask your doctor or other care provider to review them with you.      CONTINUE these " medicines which may have CHANGED, or have new prescriptions. If we are uncertain of the size of tablets/capsules you have at home, strength may be listed as something that might have changed.        Dose / Directions    ibuprofen 200 MG tablet   Commonly known as:  ADVIL/MOTRIN   This may have changed:    - medication strength  - when to take this  - reasons to take this        Dose:  600 mg   Take 3 tablets (600 mg) by mouth every 8 hours as needed for mild pain   Quantity:  30 tablet   Refills:  0         CONTINUE these medicines which have NOT CHANGED        Dose / Directions    acetaminophen 500 MG tablet   Commonly known as:  TYLENOL   Used for:  Moderate pain        Dose:  500-1000 mg   Take 1-2 tablets (500-1,000 mg) by mouth every 8 hours as needed for mild pain   Quantity:  90 tablet   Refills:  3       clindamycin 300 MG capsule   Commonly known as:  CLEOCIN        Dose:  300 mg   Take 1 capsule (300 mg) by mouth 4 times daily for 10 days   Quantity:  40 capsule   Refills:  0       docosanol 10 % Crea cream   Commonly known as:  ABREVA   Used for:  Recurrent cold sores        Apply topically 5 times daily As needed   Quantity:  2 g   Refills:  3       doxycycline 100 MG capsule   Commonly known as:  VIBRAMYCIN        Dose:  100 mg   Take 1 capsule (100 mg) by mouth 2 times daily for 10 days   Quantity:  20 capsule   Refills:  0       fluconazole 150 MG tablet   Commonly known as:  DIFLUCAN        Take one tablet in ten days (when done with antibiotics)   Quantity:  1 tablet   Refills:  0       LAMICTAL 100 MG tablet   Generic drug:  lamoTRIgine        Dose:  150 mg   Take 150 mg by mouth At Bedtime   Refills:  0       levonorgestrel 20 MCG/24HR IUD   Commonly known as:  MIRENA        Dose:  1 each   1 each (20 mcg) by Intrauterine route once for 1 dose   Refills:  0       OLANZapine 5 MG tablet   Commonly known as:  zyPREXA        Take 1 tablet (5 mg) orally at bedtime prn   Quantity:  10 tablet   Refills:   0       ondansetron 4 MG ODT tab   Commonly known as:  ZOFRAN ODT   Used for:  Nausea        Dose:  4-8 mg   Take 1-2 tablets (4-8 mg) by mouth every 8 hours as needed for nausea   Quantity:  20 tablet   Refills:  1       oxyCODONE 5 MG IR tablet   Commonly known as:  ROXICODONE        Dose:  5 mg   Take 1 tablet (5 mg) by mouth every 6 hours as needed for pain   Quantity:  20 tablet   Refills:  0       PRISTIQ PO        Dose:  100 mg   Take 100 mg by mouth every morning   Refills:  0       SUMATRIPTAN SUCCINATE PO        Dose:  50 mg   Take 50 mg by mouth once as needed for migraine Reported on 5/19/2017   Refills:  0       TOPAMAX 50 MG tablet   Generic drug:  topiramate        Dose:  25 mg   Take 25 mg by mouth At Bedtime   Refills:  0       VITAMIN D3 PO        Dose:  5000 Units   Take 5,000 Units by mouth every morning   Refills:  0            Where to get your medicines      Some of these will need a paper prescription and others can be bought over the counter. Ask your nurse if you have questions.     Bring a paper prescription for each of these medications     ibuprofen 200 MG tablet    promethazine 25 MG Suppository    promethazine 25 MG tablet                Protect others around you: Learn how to safely use, store and throw away your medicines at www.disposemymeds.org.             Medication List: This is a list of all your medications and when to take them. Check marks below indicate your daily home schedule. Keep this list as a reference.      Medications           Morning Afternoon Evening Bedtime As Needed    acetaminophen 500 MG tablet   Commonly known as:  TYLENOL   Take 1-2 tablets (500-1,000 mg) by mouth every 8 hours as needed for mild pain   Last time this was given:  975 mg on 7/21/2017 10:10 AM                                clindamycin 300 MG capsule   Commonly known as:  CLEOCIN   Take 1 capsule (300 mg) by mouth 4 times daily for 10 days   Last time this was given:  300 mg on 7/21/2017   9:07 AM                                docosanol 10 % Crea cream   Commonly known as:  ABREVA   Apply topically 5 times daily As needed                                doxycycline 100 MG capsule   Commonly known as:  VIBRAMYCIN   Take 1 capsule (100 mg) by mouth 2 times daily for 10 days                                fluconazole 150 MG tablet   Commonly known as:  DIFLUCAN   Take one tablet in ten days (when done with antibiotics)                                ibuprofen 200 MG tablet   Commonly known as:  ADVIL/MOTRIN   Take 3 tablets (600 mg) by mouth every 8 hours as needed for mild pain   Last time this was given:  600 mg on 7/21/2017 10:10 AM                                LAMICTAL 100 MG tablet   Take 150 mg by mouth At Bedtime   Last time this was given:  150 mg on 7/20/2017  9:46 PM   Generic drug:  lamoTRIgine                                levonorgestrel 20 MCG/24HR IUD   Commonly known as:  MIRENA   1 each (20 mcg) by Intrauterine route once for 1 dose                                OLANZapine 5 MG tablet   Commonly known as:  zyPREXA   Take 1 tablet (5 mg) orally at bedtime prn                                ondansetron 4 MG ODT tab   Commonly known as:  ZOFRAN ODT   Take 1-2 tablets (4-8 mg) by mouth every 8 hours as needed for nausea                                oxyCODONE 5 MG IR tablet   Commonly known as:  ROXICODONE   Take 1 tablet (5 mg) by mouth every 6 hours as needed for pain                                PRISTIQ PO   Take 100 mg by mouth every morning   Last time this was given:  100 mg on 7/21/2017  9:07 AM                                * promethazine 25 MG tablet   Commonly known as:  PHENERGAN   Take 1 tablet (25 mg) by mouth every 6 hours as needed for nausea                                * promethazine 25 MG Suppository   Commonly known as:  PHENERGAN   Place 1 suppository (25 mg) rectally every 6 hours as needed for nausea                                SUMATRIPTAN SUCCINATE PO    Take 50 mg by mouth once as needed for migraine Reported on 5/19/2017                                TOPAMAX 50 MG tablet   Take 25 mg by mouth At Bedtime   Last time this was given:  25 mg on 7/20/2017  9:46 PM   Generic drug:  topiramate                                VITAMIN D3 PO   Take 5,000 Units by mouth every morning                                * Notice:  This list has 2 medication(s) that are the same as other medications prescribed for you. Read the directions carefully, and ask your doctor or other care provider to review them with you.

## 2017-07-20 NOTE — PLAN OF CARE
Problem: Discharge Planning  Goal: Discharge Planning (Adult, OB, Behavioral, Peds)  Outcome: No Change  PRIMARY DIAGNOSIS: SYNCOPE/TIA  OUTPATIENT/OBSERVATION GOALS TO BE MET BEFORE DISCHARGE:  1. Orthostatic performed: Yes:                    Sitting Orthostatic BP: 123/69         Standing Orthostatic BP: 139/84      2. Diagnostic testing complete & at baseline neurologic testing: N/A     3. Cleared by consultants (if involved): N/A     4. Interpretation of cardiac rhythm per telemetry tech: SR     5. Tolerating adequate PO diet and medications: Clears and PO medications     6. Return to near baseline physical activity or neurologic status: No     Discharge Planner Nurse   Safe discharge environment identified: Yes  Barriers to discharge: No       Entered by: Becca Elder 07/20/2017 5:05 PM     Please review provider order for any additional goals.   Nurse to notify provider when observation goals have been met and patient is ready for discharge.

## 2017-07-20 NOTE — ED NOTES
[facility]  ED Nurse Handoff Report    Nevin Alvarado is a 26 year old female   ED Chief complaint: Abdominal Pain and Nausea & Vomiting  . ED Diagnosis:   Final diagnoses:   Pelvic pain in female   History of PID   History of endometriosis     Allergies:   Allergies   Allergen Reactions     Augmentin Swelling     Blood-Group Specific Substance Other (See Comments)     Patient has an anti-Cw and non-specific antibodies. Blood product orders may be delayed. Draw one red top and two purple top tubes for all type/screen/crossmatch orders.     Hydrocodone Nausea and Vomiting     vomiting for days     Influenza Vaccines Other (See Comments)     Oseltamivir Hives     med stopped, PN: med stopped     Vicodin [Hydrocodone-Acetaminophen] Nausea and Vomiting     Cefazolin Rash     Cephalosporins Rash       Code Status: Full Code  Activity level - Baseline/Home:  Independent. Activity Level - Current:   Stand with Assist. Lift room needed: No. Bariatric: No   Needed: No   Isolation: No. Infection: Not Applicable.     Vital Signs:   Vitals:    07/20/17 1400 07/20/17 1404 07/20/17 1519 07/20/17 1524   BP:   (!) 156/97    Pulse:   85    Resp:       Temp:       TempSrc:       SpO2: 97% 98% 100% 99%   Weight:           Cardiac Rhythm:  ,      Pain level: 0-10 Pain Scale: 8  Patient confused: No. Patient Falls Risk: Yes.   Elimination Status: Has voided   Patient Report - Initial Complaint: Abdominal pain with nausea and vomiting. Focused Assessment: GI- Generalized abdominal pain, tender to palpation associated with nausea and vomiting. Pt has been symptomatic x 2 weeks. Emesis x 2 PTA. Known ovarian cyst, which is smaller than last week when pt was seen for similar symptoms. Decreased appetite due to nausea. Pt was given toradol, phenergan and benadryl earlier in shift. Pt was up for discharge and ERT went in to room to discontinue IV and update vital signs and found pt lying flat on her back on the  "floor \"fluttering her eyes\". ERT called for help, writer entered room, visualized pt and notified MD. Pt was responsive to voice and helped back in to bed. Pt stated she felt lightheaded after going to the bathroom. Vital signs obtained and side rails up x 2.   Tests Performed: Labs, US. Abnormal Results:   Labs Ordered and Resulted from Time of ED Arrival Up to the Time of Departure from the ED   COMPREHENSIVE METABOLIC PANEL - Abnormal; Notable for the following:        Result Value    Creatinine 0.50 (*)     All other components within normal limits   HCG QUALITATIVE - Abnormal; Notable for the following:     HCG Qualitative Serum   (*)     Value: Canceled, Test credited   Unsatisfactory specimen - hemolyzed      All other components within normal limits   ROUTINE UA WITH MICROSCOPIC - Abnormal; Notable for the following:     Blood Urine Moderate (*)     RBC Urine 4 (*)     Bacteria Urine Few (*)     Squamous Epithelial /HPF Urine 4 (*)     All other components within normal limits   LIPASE   LACTIC ACID WHOLE BLOOD   CBC WITH PLATELETS DIFFERENTIAL   HCG QUALITATIVE URINE   PULSE OXIMETRY NURSING   PREP FOR PROCEDURE   US- IMPRESSION:   1. Intrauterine device in expected location.  2. Left ovarian simple cyst measuring up to 4.1 cm, slightly smaller  than on the previous exam six days earlier.     Treatments provided: 1 L bolus NS x 2, 30 mg Toradol, 12.5 mg phenergan, 25 mg benadryl  Family Comments: N/A  OBS brochure/video discussed/provided to patient:  Yes  ED Medications:   Medications   0.9% sodium chloride BOLUS (0 mLs Intravenous Stopped 7/20/17 1514)     Followed by   0.9% sodium chloride infusion (1,000 mLs Intravenous New Bag 7/20/17 1520)   ketorolac (TORADOL) injection 30 mg (30 mg Intravenous Given 7/20/17 1418)   diphenhydrAMINE (BENADRYL) injection 25 mg (25 mg Intravenous Given 7/20/17 1419)   promethazine (PHENERGAN) IV injection 12.5 mg (12.5 mg Intravenous Given 7/20/17 1419)     Drips " infusing:  Yes         ED Nurse Name/Phone Number: Marianna Garcia,   3:59 PM    RECEIVING UNIT ED HANDOFF REVIEW    Above ED Nurse Handoff Report was reviewed: Yes  Reviewed by: Becca Elder on July 20, 2017 at 4:20 PM

## 2017-07-20 NOTE — ED NOTES
"ERT went in to room to discontinue IV and update vital signs and found pt lying flat on her back on the floor \"fluttering her eyes\". ERT called for help, writer entered room, visualized pt and notified MD. Pt was responsive to voice and helped back in to bed. Pt stated she felt lightheaded after going to the bathroom. Vital signs obtained and side rails up x 2.  "

## 2017-07-20 NOTE — ED PROVIDER NOTES
"  History     Chief Complaint:  Abdominal Pain and Nausea & Vomiting      HPI   Nevin Alvarado is a 26 year old female with a past medical history notable for endometriosis who presents with abdominal pain, nausea, and vomiting. The patient was sent from [facility]. She states that she has had abdominal pain, nausea, and vomiting for the past month that has worsened over the past 2 weeks. The patient also reports that she was seen at [facility] \"a couple of times\" for the same symptoms and states that they diagnosed her with PID. The patient reports that she has not been able to see her PCP. The patient notes that she has associated loose stools and is unable to keep food down stating that she feels pain down her spine when swallowing. She also notes that she had IUD placement in 2015 but has had heavy vaginal bleeding for the past month. The patient reports that she has not been sexually active for the past 6-8 months. The patient states that she has been on Doxycycline and Clindamycin. She also states that she has taken Oxycodone, Tylenol, and ibuprofen without notable relief.    Allergies:  Augmentin  Blood Group Specific Substance - Anti CW and non-specific antibodies.  Hydrocodone  Influenza Vaccines  Oseltamivir  Vicodin  Cefazolin  Cephalosporins     Medications:    Olanzapine  Prochlorperazine  Clindamycin  Doxycycline  Oxycodone  Docosanol  Topiramate  Ondansetron  Lamotrigine  Acetaminophen  Augmented betamethasone dipropionate  Levonorgestrel  Ibuprofen  Albuterol  Sumatriptan  Desvenlafaxine  Cholecalciferol     Past Medical History:    ADD  Anorexia nervosa with bulimia  Anxiety  Borderline personality disorder  Depression  Self harm  Swallowed foreign body  Lives in independent group home  Migraine without aura  Morbid obesity  PTSD  Rectal foreign body    Past Surgical History:    EGDx4  Exam under anesthesia - anus  Removal fecal impaction or foreign body with anesthesia  Right " knee surgery  Laparoscopic ablation endometriosis  Laparotomy exploration  Mammoplasty reduction  Bilateral carpal tunnel release  Sigmoidoscopy flexible    Family History:    Father: Cerebrovascular disease    Social History:  Marital Status: Single  Presents to the ED alone  PCP: Sandra Ramos     Review of Systems   HENT: Positive for trouble swallowing.    Gastrointestinal: Positive for abdominal pain, nausea and vomiting.   Genitourinary: Positive for vaginal bleeding.   All other systems reviewed and are negative.    Physical Exam   First Vitals:  BP: 149/87  Pulse: 79  Temp: 98  F (36.7  C)  Resp: 18  Weight: 102.5 kg (226 lb)  SpO2: 97 %    Physical Exam  General: Patient lying on her right side in moderate distress, curled up and crying.  HEENT:   The scalp and head appear normal    The pupils are equal, round, and reactive to light    Extraocular muscles are intact.    The nose is normal.    The oropharynx is normal.      Uvula is in the midline.  There is no peritonsillar abscess.  Neck:  Normal range of motion.    Lungs:  Clear.      No rales, no wheezing.      There is no tachypnea.  Non-labored.  Cardiac: Regular rate.      Normal S1 and S2.      No S3 or S4.      No pathological murmur.      No pericardial rub.  Abdomen: Soft. No distension. No tympani. No rebound. Non-tender.  Pelvis:  Normal perineum,vaginal, and cervical mucosa.    No inflammation.    IUD string present.    Cervix closed.    Bimanual exam: No cervical motion tenderness.    Uterus medium orange size, non-tender.    No palpable fullness, masses, or tenderness to left or right adnexa.    Most tender over the area just distal to the superior aspect of the uterus, left and right of midline.    Lymph: No anterior or posterior cervical lymphadenopathy noted.  MS:  Normal tone.      Normal movement of all extremities.    Neuro:  Normal mentation.  No focal motor or sensory changes.      Speech normal.  Psych:  Awake.     Alert.       Normal affect.      Appropriate interactions.  Skin:  No rash.      No lesions.    Emergency Department Course   Imaging:  Radiographic findings were communicated with the patient] who voiced understanding of the findings.    US Pelvic Complete w Transvaginal & Abd/Pel Duplex Limited   Preliminary Result   IMPRESSION:    1. Intrauterine device in expected location.   2. Left ovarian simple cyst measuring up to 4.1 cm, slightly smaller   than on the previous exam six days earlier.           All Readings Per Radiology Unless Otherwise Noted.    Laboratory:  CBC: WBC 8.0, HGB 12.0, , WNL  CMP: Creatinine 0.50 (L), Rest WNL  Lipase: 126  Lactic Acid: 1.5    HCG Qualitative Pregnancy (1311): Negative.  UA: Slightly cloudy yellow urine, Blood Moderate, RBC 4 (H), Bacteria Few, Squamous Epithelial Cells/HPF 4 (H), otherwise WNL    Interventions:  1417: Normal Saline 1 L, IV bolus  1418: Toradol 30 mg, IV injection  1419: Benadryl 25 mg, IV injection  1419: Promethazine 12.5 mg, IV injection    ED Course:  Nursing notes and past medical history reviewed.   I performed a physical examination of the patient as documented above.  I explained the plan with the patient who consents to this.   The patient was sent for a US pelvic complete with transvaginal and abd/pel duplex limited while in the emergency department, findings above.  Blood was drawn from the patient. This was sent for laboratory testing, findings above.  Urine sample was obtained and sent for laboratory analysis, findings above.   1435: On recheck patient reports that she is almost completely better.  1440: [facility]  I personally reviewed the laboratory and imaging results with the patient and answered all related questions prior to discharge.  Findings and plan explained to the patient who consents to admission.   1543: I discussed the patient with Mavis Sanchez PA-C of the hospitalist service, who will admit  the patient to a observation bed for further monitoring, evaluation, and treatment.     Impression & Plan    Medical Decision Making:  Nevin Alvarado is a 26 year old female who had sever week history of lower abdominal pain that has been for the fourth time now in the ER since early July. She presently is being treated empirically for possible PID due to an IUD. Her exam today is essentially unremarkable. She came in writhing curled up in the fetal position complaining of lower abdominal pain. She reported a history of endometriosis.    Her symptoms fully resolved with IV fluids, Toradol, Phenergan, and Benadryl. She was rather surprised when I told her about the medications we used to treat her symptoms. Currently she is drinking fluids, she is pain free. She has had a CT scan done, several ultrasounds done, including the one done today, which shows smaller left ovarian cyst, everything else with good flow to ovaries. CT scan as well as 2 other ultrasounds and a right upper quadrant ultrasound.    Her laboratory indices are all normal. I am going to recommend clear fluids for the rest of today, take the medications as prescribed, she will go home with Phenergan suppositories if she is too nauseous to take it orally, but she will also have Phenergan orally as she can do that, ibuprofen every 8 hours as directed. If this is truly endometriosis that should help as well. I contacted Dr. Alfaro but was not able to speak to her directly, she said that they would work her in the next week.    If she should develop fever, new symptoms, or issues then she should return to the department.    Addendum:    This patient was about to be discharged. She had to void badly and the ER got busy and she did not get escorted to the bathroom and she finally went and came back. She had been feeling sleepy and a little lightheaded from the Phenergan and Benadryl and had a vasovagal episode. She was helped back on the bed. She  denied hitting her head or injuring her head or her back. She said that she slumped to the floor.    Currently, she appears sleepy and she feels lightheaded when she sits upright. She is on her second liter of normal saline. She has now voided twice here in the ED. I spoke with Mavis Sanchez the accepting PA for observation to continue IV fluids until the patient is able to ambulate independently.    Diagnosis:    ICD-10-CM    1. Pelvic pain in female R10.2    2. History of PID Z87.42    3. History of endometriosis Z87.42        Disposition:   Discharge to home.     Discharge Medications:   New Prescriptions    IBUPROFEN (ADVIL/MOTRIN) 200 MG TABLET    Take 3 tablets (600 mg) by mouth every 8 hours as needed for mild pain    PROMETHAZINE (PHENERGAN) 25 MG SUPPOSITORY    Place 1 suppository (25 mg) rectally every 6 hours as needed for nausea    PROMETHAZINE (PHENERGAN) 25 MG TABLET    Take 1 tablet (25 mg) by mouth every 6 hours as needed for nausea         I, Ramon Norris, am serving as a scribe on 7/20/2017 at 12:23 PM to personally document services performed by Erich Cabrera MD, based on my observations and the provider's statements to me.       Erich aCbrera MD  07/20/17 9844

## 2017-07-20 NOTE — PHARMACY-ADMISSION MEDICATION HISTORY
Admission medication history interview status for this patient is complete. See [facility] admission navigator for allergy information, prior to admission medications and immunization status.     Medication history interview source(s):Patient  Medication history resources (including written lists, pill bottles, clinic record):None    Changes made to PTA medication list:  Added: none  Deleted: albuterol MDI, diprolene AF, compazine  Changed: lamictal and Topamax    Actions taken by pharmacist (provider contacted, etc):None     Additional medication history information:None    Medication reconciliation/reorder completed by provider prior to medication history? No    For patients on insulin therapy: no (Yes/No)   Lantus/levemir/NPH/Mix 70/30 dose: _____ in AM/PM or twice daily   Sliding scale Novolog Y/N   If Yes, do you have a baseline novolog pre-meal dose: ______units with meals   Patients eat three meals a day: Y/N   Any Barriers to therapy: cost of medications/comfortable with giving injections (if applicable)/ comfortable and confident with current diabetes regimen       Prior to Admission medications    Medication Sig Last Dose Taking? Auth Provider   promethazine (PHENERGAN) 25 MG tablet Take 1 tablet (25 mg) by mouth every 6 hours as needed for nausea  Yes Erich Cabrera MD   promethazine (PHENERGAN) 25 MG Suppository Place 1 suppository (25 mg) rectally every 6 hours as needed for nausea  Yes Erich Cabrera MD   ibuprofen (ADVIL/MOTRIN) 200 MG tablet Take 3 tablets (600 mg) by mouth every 8 hours as needed for mild pain  Yes Erich Cabrera MD   OLANZapine (ZYPREXA) 5 MG tablet Take 1 tablet (5 mg) orally at bedtime prn  Yes Alyssa Menon MD   clindamycin (CLEOCIN) 300 MG capsule Take 1 capsule (300 mg) by mouth 4 times daily for 10 days 7/20/2017 at am Yes Bruce Pryor MD   doxycycline (VIBRAMYCIN) 100 MG capsule Take 1 capsule (100 mg) by mouth 2 times daily for 10 days 7/20/2017 at am Yes  Bruce Pryor MD   oxyCODONE (ROXICODONE) 5 MG IR tablet Take 1 tablet (5 mg) by mouth every 6 hours as needed for pain Past Week at Unknown time Yes Bruce Pryor MD   fluconazole (DIFLUCAN) 150 MG tablet Take one tablet in ten days (when done with antibiotics) not started yet Yes Bruce Pryor MD   docosanol (ABREVA) 10 % CREA cream Apply topically 5 times daily As needed  Yes Sandra Ramos MD   topiramate (TOPAMAX) 50 MG tablet Take 25 mg by mouth At Bedtime  7/19/2017 at Unknown time Yes Sandra Ramos MD   ondansetron (ZOFRAN ODT) 4 MG ODT tab Take 1-2 tablets (4-8 mg) by mouth every 8 hours as needed for nausea 7/19/2017 at Unknown time Yes Sandra Ramos MD   lamoTRIgine (LAMICTAL) 100 MG tablet Take 150 mg by mouth At Bedtime  7/19/2017 at Unknown time Yes Kodi Cage MD   acetaminophen (TYLENOL) 500 MG tablet Take 1-2 tablets (500-1,000 mg) by mouth every 8 hours as needed for mild pain 7/19/2017 at Unknown time Yes Sandra Ramos MD   levonorgestrel (MIRENA) 20 MCG/24HR IUD 1 each (20 mcg) by Intrauterine route once for 1 dose  Yes Sandra Ramos MD   SUMATRIPTAN SUCCINATE PO Take 50 mg by mouth once as needed for migraine Reported on 5/19/2017 Past Week at Unknown time Yes Unknown, Entered By History   Desvenlafaxine Succinate (PRISTIQ PO) Take 100 mg by mouth every morning  7/20/2017 at am Yes Reported, Patient   Cholecalciferol (VITAMIN D3 PO) Take 5,000 Units by mouth every morning  7/20/2017 at am Yes Reported, Patient

## 2017-07-20 NOTE — DISCHARGE INSTRUCTIONS
Continue present clindamycin and doxycycline until finished.  Discharge Instructions  Vomiting    You have been seen today for vomiting. This is usually caused by a virus, but some bacteria, parasites, medicines or other medical conditions can cause similar symptoms. At this time your doctor does not find that your vomiting is a sign of anything dangerous or life-threatening. However, sometimes the signs of serious illness do not show up right away. If you have new or worse symptoms, you may need to be seen again in the emergency department or by your primary doctor. Remember that serious problems like appendicitis can start as vomiting.     Return to the Emergency Department if:    You keep throwing up and you are not able to keep liquids down.     You feel you are getting dehydrated, such as being very thirsty, not urinating at least every 8-12 hours, or feeling faint or lightheaded.     You develop a new fever, or your fever continues for more than 2 days.     You have belly pain that seems worse than cramps, is in one spot, or is getting worse over time.     You have blood in your vomit or stools.     You feel very weak.    You are not starting to improve within 24 hours of your visit here.     What can I do to help myself?    The most important thing to do is to drink clear liquids. If you have been vomiting a lot, it is best to have only small, frequent sips of liquids. Drinking too much at once may cause more vomiting. If you are vomiting often, you must replace minerals, sodium and potassium lost with your illness. Pedialyte  and sports drinks can help you replace these minerals. You can also drink clear liquids such as water, weak tea, apple juice, and 7-Up . Avoid acid liquids (orange), caffeine (coffee) or alcohol. Do not drink milk until you no longer have diarrhea.     After liquids are staying down, you may start eating mild foods. Soda crackers, toast, plain noodles, gelatin, applesauce and bananas  are good first choices. Avoid foods that have acid, are spicy, fatty or have a lot of fiber (such as meats, coarse grains, vegetables). You may start eating these foods again in about 3 days when you are better.     Sometimes treatment includes prescription medicine to prevent nausea and vomiting. If your doctor prescribes these for you, take them as directed.     Don t take ibuprofen, or other nonsteroidal anti-inflammatory medicines without checking with your healthcare provider.   If you were given a prescription for medicine here today, be sure to read all of the information (including the package insert) that comes with your prescription.  This will include important information about the medicine, its side effects, and any warnings that you need to know about.  The pharmacist who fills the prescription can provide more information and answer questions you may have about the medicine.  If you have questions or concerns that the pharmacist cannot address, please call or return to the Emergency Department.       Opioid Medication Information    Pain medications are among the most commonly prescribed medicines, so we are including this information for all our patients. If you did not receive pain medication or get a prescription for pain medicine, you can ignore it.     You may have been given a prescription for an opioid (narcotic) pain medicine and/or have received a pain medicine while here in the Emergency Department. These medicines can make you drowsy or impaired. You must not drive, operate dangerous equipment, or engage in any other dangerous activities while taking these medications. If you drive while taking these medications, you could be arrested for DUI, or driving under the influence. Do not drink any alcohol while you are taking these medications.     Opioid pain medications can cause addiction. If you have a history of chemical dependency of any type, you are at a higher risk of becoming addicted  to pain medications.  Only take these prescribed medications to treat your pain when all other options have been tried. Take it for as short a time and as few doses as possible. Store your pain pills in a secure place, as they are frequently stolen and provide a dangerous opportunity for children or visitors in your house to start abusing these powerful medications. We will not replace any lost or stolen medicine.  As soon as your pain is better, you should flush all your remaining medication.     Many prescription pain medications contain Tylenol  (acetaminophen), including Vicodin , Tylenol #3 , Norco , Lortab , and Percocet .  You should not take any extra pills of Tylenol  if you are using these prescription medications or you can get very sick.  Do not ever take more than 3000 mg of acetaminophen in any 24 hour period.    All opioids tend to cause constipation. Drink plenty of water and eat foods that have a lot of fiber, such as fruits, vegetables, prune juice, apple juice and high fiber cereal.  Take a laxative if you don t move your bowels at least every other day. Miralax , Milk of Magnesia, Colace , or Senna  can be used to keep you regular.      Remember that you can always come back to the Emergency Department if you are not able to see your regular doctor in the amount of time listed above, if you get any new symptoms, or if there is anything that worries you  Endometriosis    Endometriosis is a condition that occurs when the tissue that lines the uterus begins to grow where it should not. The tissue may grow on the outside surface of the uterus, the ovaries, or fallopian tubes. It may also grow on the bowel, rectum, bladder, or other parts. The tissue responds to the same hormones that control your menstrual cycle. So it may swell and bleed just like the lining of the uterus, which is shed every month during your period. The swelling and blood irritate nearby tissues. This causes pelvic or lower  abdominal (belly) pain and cramping. The pain is often worse around the time of your period. Pain during sex is also common. Some women have pain during bowel movements or urination. Over time, scar tissue may form. This scar tissue can bind organs together. It can also cause problems getting pregnant (infertility).  The exact cause of endometriosis is unknown. The best way to confirm a diagnosis is with a procedure called laparoscopy. This allows the provider to look inside the abdomen with a small tube and light.  Treatment depends on your symptoms. If pain is mild, no treatment may be needed. If pain is more severe, medicines may be advised. Surgery may also be an option. Your provider can tell you more as needed.  Home care  Medicines  To help manage endometriosis, any of the following medicines may be used:    Pain relievers, such as non-steroidal anti-inflammatory drugs (NSAIDS)    Hormonal medicines    Birth control pills    Progestin-only pills    Gonadotropin-releasing hormone (GnRH) agonists    Danazol  If you re prescribed medicines, be sure to take them as directed. Also, report any side effects you have to your provider.  General care    Rest as needed when you have symptoms.    To help ease pain or cramping, apply a heating pad to where the pain is. You may also use a hot water bottle.  Follow-up care  Follow up with your healthcare provider, or as directed.  When to seek medical advice  Call your healthcare provider right away if any of these occur:    Fever of 100.4 F (38 C) or higher, or as directed by your provider    Abdominal or pelvic pain or cramping worsens, or doesn t improve with medicines    Pain during sex, urination, or bowel movements continues    Heavy bleeding (soaking 1 pad or tampon every hour for 3 hours)    Missed periods (if they are usually regular)    Sudden, severe pain in the abdomen    Abdominal swelling with nausea or vomiting    Blood in the urine or problems  urinating    Blood in the stool    Dizziness, weakness, fainting  Note: If you have been unable to get pregnant after trying for 1 year, see your provider. He or she can talk with you about infertility testing.   Date Last Reviewed: 6/11/2015 2000-2017 [employer]. [address] [place] [id number]. All rights reserved. This information is not intended as a substitute for professional medical care. Always follow your healthcare professional's instructions.

## 2017-07-21 VITALS
HEIGHT: 64 IN | RESPIRATION RATE: 16 BRPM | TEMPERATURE: 97.5 F | SYSTOLIC BLOOD PRESSURE: 151 MMHG | OXYGEN SATURATION: 100 % | WEIGHT: 231.3 LBS | HEART RATE: 87 BPM | BODY MASS INDEX: 39.49 KG/M2 | DIASTOLIC BLOOD PRESSURE: 85 MMHG

## 2017-07-21 LAB
ANION GAP SERPL CALCULATED.3IONS-SCNC: 8 MMOL/L (ref 3–14)
BASOPHILS # BLD AUTO: 0 10E9/L (ref 0–0.2)
BASOPHILS NFR BLD AUTO: 0.4 %
BUN SERPL-MCNC: 9 MG/DL (ref 7–30)
CALCIUM SERPL-MCNC: 8.3 MG/DL (ref 8.5–10.1)
CHLORIDE SERPL-SCNC: 114 MMOL/L (ref 94–109)
CO2 SERPL-SCNC: 22 MMOL/L (ref 20–32)
CREAT SERPL-MCNC: 0.58 MG/DL (ref 0.52–1.04)
DIFFERENTIAL METHOD BLD: ABNORMAL
EOSINOPHIL # BLD AUTO: 0.1 10E9/L (ref 0–0.7)
EOSINOPHIL NFR BLD AUTO: 2.5 %
ERYTHROCYTE [DISTWIDTH] IN BLOOD BY AUTOMATED COUNT: 13.9 % (ref 10–15)
GFR SERPL CREATININE-BSD FRML MDRD: ABNORMAL ML/MIN/1.7M2
GLUCOSE SERPL-MCNC: 105 MG/DL (ref 70–99)
HCT VFR BLD AUTO: 33.5 % (ref 35–47)
HGB BLD-MCNC: 10.7 G/DL (ref 11.7–15.7)
IMM GRANULOCYTES # BLD: 0 10E9/L (ref 0–0.4)
IMM GRANULOCYTES NFR BLD: 0.2 %
LYMPHOCYTES # BLD AUTO: 1.3 10E9/L (ref 0.8–5.3)
LYMPHOCYTES NFR BLD AUTO: 28 %
MCH RBC QN AUTO: 29.3 PG (ref 26.5–33)
MCHC RBC AUTO-ENTMCNC: 31.9 G/DL (ref 31.5–36.5)
MCV RBC AUTO: 92 FL (ref 78–100)
MONOCYTES # BLD AUTO: 0.5 10E9/L (ref 0–1.3)
MONOCYTES NFR BLD AUTO: 10 %
NEUTROPHILS # BLD AUTO: 2.8 10E9/L (ref 1.6–8.3)
NEUTROPHILS NFR BLD AUTO: 58.9 %
NRBC # BLD AUTO: 0 10*3/UL
NRBC BLD AUTO-RTO: 0 /100
PLATELET # BLD AUTO: 217 10E9/L (ref 150–450)
POTASSIUM SERPL-SCNC: 3.8 MMOL/L (ref 3.4–5.3)
RBC # BLD AUTO: 3.65 10E12/L (ref 3.8–5.2)
SODIUM SERPL-SCNC: 144 MMOL/L (ref 133–144)
WBC # BLD AUTO: 4.7 10E9/L (ref 4–11)

## 2017-07-21 PROCEDURE — 85025 COMPLETE CBC W/AUTO DIFF WBC: CPT | Performed by: PHYSICIAN ASSISTANT

## 2017-07-21 PROCEDURE — 99217 ZZC OBSERVATION CARE DISCHARGE: CPT | Performed by: INTERNAL MEDICINE

## 2017-07-21 PROCEDURE — 36415 COLL VENOUS BLD VENIPUNCTURE: CPT | Performed by: PHYSICIAN ASSISTANT

## 2017-07-21 PROCEDURE — G0378 HOSPITAL OBSERVATION PER HR: HCPCS

## 2017-07-21 PROCEDURE — A9270 NON-COVERED ITEM OR SERVICE: HCPCS | Mod: GY | Performed by: PHYSICIAN ASSISTANT

## 2017-07-21 PROCEDURE — 80048 BASIC METABOLIC PNL TOTAL CA: CPT | Performed by: PHYSICIAN ASSISTANT

## 2017-07-21 PROCEDURE — 25000132 ZZH RX MED GY IP 250 OP 250 PS 637: Mod: GY | Performed by: PHYSICIAN ASSISTANT

## 2017-07-21 RX ORDER — PROMETHAZINE HYDROCHLORIDE 25 MG/1
25 TABLET ORAL EVERY 6 HOURS PRN
Qty: 10 TABLET | Refills: 0 | Status: SHIPPED | OUTPATIENT
Start: 2017-07-21 | End: 2017-07-31

## 2017-07-21 RX ADMIN — DESVENLAFAXINE SUCCINATE 100 MG: 100 TABLET, EXTENDED RELEASE ORAL at 09:07

## 2017-07-21 RX ADMIN — CLINDAMYCIN HYDROCHLORIDE 300 MG: 300 CAPSULE ORAL at 12:24

## 2017-07-21 RX ADMIN — IBUPROFEN 600 MG: 600 TABLET ORAL at 10:10

## 2017-07-21 RX ADMIN — ACETAMINOPHEN 975 MG: 325 TABLET, FILM COATED ORAL at 10:10

## 2017-07-21 RX ADMIN — ACETAMINOPHEN 975 MG: 325 TABLET, FILM COATED ORAL at 05:00

## 2017-07-21 RX ADMIN — IBUPROFEN 600 MG: 600 TABLET ORAL at 05:00

## 2017-07-21 RX ADMIN — CLINDAMYCIN HYDROCHLORIDE 300 MG: 300 CAPSULE ORAL at 09:07

## 2017-07-21 RX ADMIN — DOXYCYCLINE HYCLATE 100 MG: 100 TABLET ORAL at 09:07

## 2017-07-21 NOTE — PLAN OF CARE
PRIMARY DIAGNOSIS: SYNCOPE  OUTPATIENT/OBSERVATION GOALS TO BE MET BEFORE DISCHARGE:  1. Orthostatic performed: No, completed previous shift     2. Diagnostic testing complete & at baseline neurologic testing: Yes     3. Cleared by consultants (if involved): N/A     4. Interpretation of cardiac rhythm per telemetry tech: SR     5. Tolerating adequate PO diet and medications: Yes     6. Return to near baseline physical activity or neurologic status: Yes, standby to independent     Discharge Planner Nurse   Safe discharge environment identified: Yes  Barriers to discharge: No       Entered by: Krystin Reich 07/21/2017 2:07 AM     Please review provider order for any additional goals.   Nurse to notify provider when observation goals have been met and patient is ready for discharge

## 2017-07-21 NOTE — PLAN OF CARE
"Problem: Discharge Planning  Goal: Discharge Planning (Adult, OB, Behavioral, Peds)  Outcome: Improving  PRIMARY DIAGNOSIS: ACUTE PAIN/abdominal pain  OUTPATIENT/OBSERVATION GOALS TO BE MET BEFORE DISCHARGE:  1. Pain Status: Improved-controlled with oral pain medications.     2. Return to near baseline physical activity: Yes     3. Cleared for discharge by consultants (if involved): Yes     Discharge Planner Nurse   Safe discharge environment identified: Yes  Barriers to discharge: No       Entered by: Katie Tellez 07/21/2017 11:06 AM     Please review provider order for any additional goals.   Nurse to notify provider when observation goals have been met and patient is ready for discharge.     Patient is alert and oriented. Patient complains of abdominal pain that feels \"electrical\". Patient on scheduled tylenol and ibuprofen which is making pain tolerable. Patient denies nausea, is eating breakfast. Patient is from an independent living where she has someone else administer her meds for her. Patient has been SR on tele. Patient is up with SBA. Plan to walk patient to see how she does. Will continue to monitor.      "

## 2017-07-21 NOTE — PLAN OF CARE
Problem: Discharge Planning  Goal: Discharge Planning (Adult, OB, Behavioral, Peds)  PRIMARY DIAGNOSIS: SYNCOPE/TIA  OUTPATIENT/OBSERVATION GOALS TO BE MET BEFORE DISCHARGE:  1. Orthostatic performed: Yes:          Sitting Orthostatic BP: 123/69         Standing Orthostatic BP: 139/84       2. Diagnostic testing complete & at baseline neurologic testing: N/A      3. Cleared by consultants (if involved): N/A      4. Interpretation of cardiac rhythm per telemetry tech: SR      5. Tolerating adequate PO diet and medications: Tolerating Regular diet with antiemetics      6. Return to near baseline physical activity or neurologic status: No, oob sba      Discharge Planner Nurse   Safe discharge environment identified: Yes  Barriers to discharge: No       Entered by: Becca Elder 07/20/2017 9:19 PM     Please review provider order for any additional goals.   Patient complains of intermittent nausea, treating with Zofran.  Declining most PO medication.   Complains of mild pain in lower abdomen, decline medications.

## 2017-07-21 NOTE — PLAN OF CARE
PRIMARY DIAGNOSIS: SYNCOPE  OUTPATIENT/OBSERVATION GOALS TO BE MET BEFORE DISCHARGE:  1. Orthostatic performed: No, completed previous shift    2. Diagnostic testing complete & at baseline neurologic testing: Yes    3. Cleared by consultants (if involved): N/A    4. Interpretation of cardiac rhythm per telemetry tech: SR    5. Tolerating adequate PO diet and medications: Yes    6. Return to near baseline physical activity or neurologic status: Yes, standby to independent    Discharge Planner Nurse   Safe discharge environment identified: Yes  Barriers to discharge: No       Entered by: Krystin Reich 07/21/2017 2:07 AM     Please review provider order for any additional goals.   Nurse to notify provider when observation goals have been met and patient is ready for discharge.

## 2017-07-21 NOTE — DISCHARGE SUMMARY
[facility]    Discharge Summary  Hospitalist    Date of Admission:  7/20/2017  Date of Discharge:  7/21/2017 12:32 PM  Provider:  Kamilla Barahona DO    Discharge Diagnoses   1.  Mild dehydration  2.  Near syncope vs syncopal episode  3.  Endometriosis  4.  Chronic nausea    Other medical issues:  Past Medical History:   Diagnosis Date     ADD (attention deficit disorder)      Anorexia nervosa with bulimia     history of; on Topamax     Anxiety      Borderline personality disorder      Depression      H/O self-harm      H/O swallowed foreign body     Recurrent issue     Lives in independent group home     due to debilitating mental illness     Migraine without aura     no known triggers; on Topamax bid and Imitrex PRN     Morbid obesity (H)      PTSD (post-traumatic stress disorder)      Rectal foreign body     Recurrent issue       History of Present Illness   Nevin Alvarado is an 26 year old female who presented with abd pain to the ER.  Please see the admission history and physical for full details.    Hospital Course   Nevin Alvarado was admitted on 7/20/2017.  The following problems were addressed during her hospitalization:    1.  Mild dehydration and possible syncope   Ms. Alvarado is a 27 yo female pt who presented to the ER for abd pain and nausea.  She has been dealing with these symptoms for some time d/t endometriosis.  She has scheduled f/u with OB/GYN that has been set up throught the ER and was to be d/c home after a neg pregnancy test and a normal pelvic ultrasound, but then was found on the floor of her ER room and stating that she passed out.  The event was unwitnessed,but It was felt that she may have had near-syncope or a syncopal event and so was admitted to the OBS unit overnight.    She had no arrythmias on tele overnight.  She was up ambulating in the halls without difficulty or dizziness.  She was treated with IVF overnight and that seemed to improve her  overall sense of well-being.    2.  Chronic abd pain and endometriosis  No changes were made in her treatment regimine at time of d/c .  She will need to f/u with her OB/GYN, as scheduled.    Significant Results and Procedures       Pending Results   Unresulted Labs Ordered in the Past 30 Days of this Admission     Date and Time Order Name Status Description    7/20/2017 1255 Drug screen urine In process           Code Status   Full Code       Primary Care Physician   Sandra Ramos    Physical Exam   Temp: 97.5  F (36.4  C) Temp src: Oral BP: 151/85 Pulse: 87   Resp: 16 SpO2: 100 % O2 Device: None (Room air)    Vitals:    07/20/17 1146 07/20/17 1608   Weight: 102.5 kg (226 lb) 104.9 kg (231 lb 4.8 oz)     Vital Signs with Ranges  Temp:  [96.5  F (35.8  C)-97.7  F (36.5  C)] 97.5  F (36.4  C)  Pulse:  [70-94] 87  Resp:  [16-18] 16  BP: (101-156)/(44-99) 151/85  SpO2:  [95 %-100 %] 100 %     PT SEEN AND EXAMINED ON DAY OF D/C  GEN:  Alert, oriented x 3, comfortable, NAD.  HEENT:  Normocephalic/atraumatic, PERRL, no scleral icterus, no nasal discharge, mouth moist,   CV:  Regular rate and rhythm, no murmur to ausc.  S1 + S2 noted, no S3 or S4.  LUNGS:  Clear to auscultation bilaterally.  No rales/rhonchi/wheezing auscultated bilaterally.  No costal retractions bilaterally.  Symmetric chest rise on inhalation noted.  ABD:  Active bowel sounds, soft, non-tender/non-distended.  No rebound/guarding/rigidity.  No masses palpated.  No obvious HSM to exam.  EXT:  No edema or cyanosis bilaterally. No joint synovitis noted.  No calf-tenderness or asymmetry noted.  SKIN:  Dry to touch, no rashes or jaundice noted.  PSYCH:  Mood appropriate, Not tearful or depressed. Flattened affect  NEURO:  No tremors at rest    Discharge Disposition   Discharged to assisted living    Consultations This Hospital Stay   None    Time Spent on this Encounter   Kamilla MURRAY, personally saw the patient today and spent greater than  30 minutes discharging this patient.    Discharge Orders     Reason for your hospital stay   You were admitted for lightheadedness after receiving IV medications in the ER for nausea.  You were given IVF and supportive care and improved.  You will need close outpt f/u with your OB/GYN, as scheduled, on 7/25/17 to follow-up on your endometriosis and chronic abdominal pain.     Follow-up and recommended labs and tests    F/U with Dr. Kat, OB/GYN, for f/u on 7/25/17 as previously scheduled.     Activity   Your activity upon discharge: activity as tolerated and no driving for today     Full Code     Diet   Follow this diet upon discharge:  Resume home diet       Discharge Medications   Current Discharge Medication List      START taking these medications    Details   promethazine (PHENERGAN) 25 MG tablet Take 1 tablet (25 mg) by mouth every 6 hours as needed for nausea  Qty: 10 tablet, Refills: 0    Associated Diagnoses: Nausea         CONTINUE these medications which have CHANGED    Details   ibuprofen (ADVIL/MOTRIN) 200 MG tablet Take 3 tablets (600 mg) by mouth every 8 hours as needed for mild pain  Qty: 30 tablet, Refills: 0         CONTINUE these medications which have NOT CHANGED    Details   OLANZapine (ZYPREXA) 5 MG tablet Take 1 tablet (5 mg) orally at bedtime prn  Qty: 10 tablet, Refills: 0      clindamycin (CLEOCIN) 300 MG capsule Take 1 capsule (300 mg) by mouth 4 times daily for 10 days  Qty: 40 capsule, Refills: 0      doxycycline (VIBRAMYCIN) 100 MG capsule Take 1 capsule (100 mg) by mouth 2 times daily for 10 days  Qty: 20 capsule, Refills: 0      oxyCODONE (ROXICODONE) 5 MG IR tablet Take 1 tablet (5 mg) by mouth every 6 hours as needed for pain  Qty: 20 tablet, Refills: 0      fluconazole (DIFLUCAN) 150 MG tablet Take one tablet in ten days (when done with antibiotics)  Qty: 1 tablet, Refills: 0      docosanol (ABREVA) 10 % CREA cream Apply topically 5 times daily As needed  Qty: 2 g, Refills: 3     Associated Diagnoses: Recurrent cold sores      topiramate (TOPAMAX) 50 MG tablet Take 25 mg by mouth At Bedtime       ondansetron (ZOFRAN ODT) 4 MG ODT tab Take 1-2 tablets (4-8 mg) by mouth every 8 hours as needed for nausea  Qty: 20 tablet, Refills: 1    Associated Diagnoses: Nausea      lamoTRIgine (LAMICTAL) 100 MG tablet Take 150 mg by mouth At Bedtime       acetaminophen (TYLENOL) 500 MG tablet Take 1-2 tablets (500-1,000 mg) by mouth every 8 hours as needed for mild pain  Qty: 90 tablet, Refills: 3    Comments: Please discontinue other Tylenol prescriptions.  Associated Diagnoses: Moderate pain      levonorgestrel (MIRENA) 20 MCG/24HR IUD 1 each (20 mcg) by Intrauterine route once for 1 dose      SUMATRIPTAN SUCCINATE PO Take 50 mg by mouth once as needed for migraine Reported on 5/19/2017      Desvenlafaxine Succinate (PRISTIQ PO) Take 100 mg by mouth every morning       Cholecalciferol (VITAMIN D3 PO) Take 5,000 Units by mouth every morning            Allergies   Allergies   Allergen Reactions     Augmentin Swelling     Blood-Group Specific Substance Other (See Comments)     Patient has an anti-Cw and non-specific antibodies. Blood product orders may be delayed. Draw one red top and two purple top tubes for all type/screen/crossmatch orders.     Hydrocodone Nausea and Vomiting     vomiting for days     Influenza Vaccines Other (See Comments)     Oseltamivir Hives     med stopped, PN: med stopped     Vicodin [Hydrocodone-Acetaminophen] Nausea and Vomiting     Cefazolin Rash     Cephalosporins Rash     Data     Recent Labs  Lab 07/21/17  0635 07/20/17  1410 07/16/17  1325   WBC 4.7 8.0 8.7   HGB 10.7* 12.0 11.6*   HCT 33.5* 36.2 35.0   MCV 92 90 90    258 235       Recent Labs  Lab 07/21/17  0635 07/20/17  1301 07/16/17  1325    141 146*   POTASSIUM 3.8 4.0 3.6   CHLORIDE 114* 109 114*   CO2 22 24 19*   ANIONGAP 8 8 13   * 86 90   BUN 9 9 9   CR 0.58 0.50* 0.53   GFRESTIMATED >90Non   GFR Calc >90Non  GFR Calc >90Non  GFR Calc   GFRESTBLACK >90African American GFR Calc >90African American GFR Calc >90African American GFR Calc   KELBY 8.3* 9.3 8.4*     No results for input(s): CULT in the last 168 hours.    Recent Labs  Lab 07/20/17  1410   TSH 1.99   Results for ABAD TONG (MRN [id number]) as of 7/22/2017 06:58   Ref. Range 7/20/2017 13:11   HCG Qual Urine Latest Ref Range: NEG  Negative   Lactic acid - 1.5    Recent Labs  Lab 07/20/17  1255   COLOR Straw   APPEARANCE Slightly Cloudy   URINEGLC Negative   URINEBILI Negative   URINEKETONE Negative   SG 1.005   UBLD Moderate*   URINEPH 7.0   PROTEIN Negative   NITRITE Negative   LEUKEST Negative   RBCU 4*   WBCU <1     Results for orders placed or performed during the hospital encounter of 07/20/17   US Pelvic Complete w Transvaginal & Abd/Pel Duplex Limited    Narrative    ULTRASOUND PELVIS COMPLETE WITH TRANSVAGINAL AND DOPPLER LIMITED  IMAGING  7/20/2017 1:52 PM    HISTORY: Pelvic pain.     TECHNIQUE: Transabdominal images of the pelvis are supplemented with  endovaginal images to better define anatomy.    COMPARISON: 7/14/2017.    FINDINGS: The uterus measures 8.5 x 4.1 x 3.3 cm. Endometrial stripe  thickness is 0.6 cm. Again seen is an intrauterine device in the  expected location.    The right ovary is unremarkable. The left ovary contains a 4.1 x 3.4 x  2.7 cm simple cyst. This is slightly smaller than previously seen left  ovarian cyst. Color flow imaging and Doppler waveform analysis show no  evidence for ovarian torsion bilaterally. No free fluid. No abnormal  adnexal mass lesions.      Impression    IMPRESSION:   1. Intrauterine device in expected location.  2. Left ovarian simple cyst measuring up to 4.1 cm, slightly smaller  than on the previous exam six days earlier.    VERNELL SCHMIDT MD

## 2017-07-21 NOTE — PROGRESS NOTES
Pt seen and examined.  Starting to eat breakfast.  Will ambulate in halls after breakfast and will d/c later today if able to ambulate.

## 2017-07-21 NOTE — PLAN OF CARE
Problem: Discharge Planning  Goal: Discharge Planning (Adult, OB, Behavioral, Peds)  Outcome: Adequate for Discharge Date Met:  07/21/17  Patient's After Visit Summary was reviewed with patient.  Patient verbalized understanding of After Visit Summary, recommended follow up and was given an opportunity to ask questions.   Discharge medications sent home with patient/family: Not applicable, no new meds ordered   Discharged with other:mom       OBSERVATION patient END time: 12:30pm     Patient was stable and ambulatory at discharge.

## 2017-07-24 LAB
ACETAMINOPHEN QUAL: NEGATIVE
AMANTADINE: NEGATIVE
AMITRIPTYLINE QUAL: NEGATIVE
AMOXAPINE: NEGATIVE
AMPHETAMINES QUAL: NEGATIVE
ATROPINE: NEGATIVE
BENZODIAZ UR QL: ABNORMAL
CAFFEINE QUAL: POSITIVE
CANNABINOIDS UR QL SCN: ABNORMAL
CARBAMAZEPINE QUAL: NEGATIVE
CHLORPHENIRAMINE: NEGATIVE
CHLORPROMAZINE: NEGATIVE
CITALOPRAM QUAL: NEGATIVE
CLOMIPRAMINE QUAL: NEGATIVE
COCAINE QUAL: NEGATIVE
COCAINE UR QL: ABNORMAL
CODEINE QUAL: NEGATIVE
DESIPRAMINE QUAL: NEGATIVE
DEXTROMETHORPHAN: NEGATIVE
DIPHENHYDRAMINE: NEGATIVE
DOXEPIN/METABOLITE: NEGATIVE
DOXYLAMINE: NEGATIVE
EPHEDRINE OR PSEUDO: NEGATIVE
FENTANYL QUAL: NEGATIVE
FLUOXETINE AND METAB: NEGATIVE
HYDROCODONE QUAL: NEGATIVE
HYDROMORPHONE QUAL: NEGATIVE
IBUPROFEN QUAL: NEGATIVE
IMIPRAMINE QUAL: NEGATIVE
LAMOTRIGINE QUAL: POSITIVE
LOXAPINE: NEGATIVE
MAPROTYLINE: NEGATIVE
MDMA QUAL: NEGATIVE
MEPERIDINE QUAL: NEGATIVE
METHAMPHETAMINE: NEGATIVE
METHODONE QUAL: NEGATIVE
MORPHINE QUAL: NEGATIVE
NICOTINE: NEGATIVE
NORTRIPTYLINE QUAL: NEGATIVE
OLANZAPINE QUAL: NEGATIVE
OPIATES UR QL SCN: ABNORMAL
OXYCODONE QUAL: NEGATIVE
PENTAZOCINE: NEGATIVE
PHENCYCLIDINE QUAL: NEGATIVE
PHENMETRAZINE: NEGATIVE
PHENTERMINE: NEGATIVE
PHENYLBUTAZONE: NEGATIVE
PHENYLPROPANOLAMINE: NEGATIVE
PROPOXPHENE QUAL: NEGATIVE
PROPRANOLOL QUAL: NEGATIVE
PYRILAMINE: NEGATIVE
SALICYLATE QUAL: NEGATIVE
THEOBROMINE: POSITIVE
TOPIRAMATE QUAL: POSITIVE
TRIMIPRAMINE QUAL: NEGATIVE
VENLAFAXINE QUAL: ABNORMAL

## 2017-07-28 ENCOUNTER — HOSPITAL ENCOUNTER (EMERGENCY)
Facility: CLINIC | Age: 27
Discharge: HOME | End: 2017-07-29
Attending: EMERGENCY MEDICINE | Admitting: EMERGENCY MEDICINE
Payer: MEDICARE

## 2017-07-28 DIAGNOSIS — G89.29 CHRONIC ABDOMINAL PAIN: ICD-10-CM

## 2017-07-28 DIAGNOSIS — S00.93XA CONTUSION OF HEAD, UNSPECIFIED PART OF HEAD, INITIAL ENCOUNTER: ICD-10-CM

## 2017-07-28 DIAGNOSIS — R11.2 NAUSEA AND VOMITING, INTRACTABILITY OF VOMITING NOT SPECIFIED, UNSPECIFIED VOMITING TYPE: ICD-10-CM

## 2017-07-28 DIAGNOSIS — R10.9 CHRONIC ABDOMINAL PAIN: ICD-10-CM

## 2017-07-28 PROCEDURE — 99285 EMERGENCY DEPT VISIT HI MDM: CPT | Mod: 25

## 2017-07-28 PROCEDURE — 96361 HYDRATE IV INFUSION ADD-ON: CPT

## 2017-07-28 PROCEDURE — A9270 NON-COVERED ITEM OR SERVICE: HCPCS | Mod: GY | Performed by: EMERGENCY MEDICINE

## 2017-07-28 PROCEDURE — 96374 THER/PROPH/DIAG INJ IV PUSH: CPT

## 2017-07-28 PROCEDURE — 25000132 ZZH RX MED GY IP 250 OP 250 PS 637: Mod: GY | Performed by: EMERGENCY MEDICINE

## 2017-07-28 RX ORDER — SODIUM CHLORIDE 9 MG/ML
1000 INJECTION, SOLUTION INTRAVENOUS CONTINUOUS
Status: DISCONTINUED | OUTPATIENT
Start: 2017-07-28 | End: 2017-07-29 | Stop reason: HOSPADM

## 2017-07-28 RX ORDER — OXYCODONE AND ACETAMINOPHEN 5; 325 MG/1; MG/1
1 TABLET ORAL ONCE
Status: COMPLETED | OUTPATIENT
Start: 2017-07-28 | End: 2017-07-28

## 2017-07-28 RX ORDER — ONDANSETRON 2 MG/ML
4 INJECTION INTRAMUSCULAR; INTRAVENOUS
Status: COMPLETED | OUTPATIENT
Start: 2017-07-28 | End: 2017-07-29

## 2017-07-28 RX ADMIN — OXYCODONE HYDROCHLORIDE AND ACETAMINOPHEN 1 TABLET: 5; 325 TABLET ORAL at 23:55

## 2017-07-28 NOTE — ED AVS SNAPSHOT
[facility] Emergency Department    [address]    [facility] [address]    Phone:  [phone]    Fax:  [phone]                                       Nevin Alvarado   MRN: [id number]    Department:  [facility] Emergency Department   Date of Visit:  7/28/2017           Patient Information     Date Of Birth          1990        Your diagnoses for this visit were:     Contusion of head, unspecified part of head, initial encounter     Nausea and vomiting, intractability of vomiting not specified, unspecified vomiting type     Chronic abdominal pain        You were seen by Asher Craft MD.      Follow-up Information     Follow up with Sandra Ramos MD. Call in 3 days.    Specialty:  Internal Medicine    Contact information:    [facility]  [address] [facility] [address]  [phone]          Discharge Instructions       Discharge Instructions  Head Injury    You have been seen today for a head injury. You were checked for serious problems, like bleeding on the brain, but these problems cannot always be found right away.  Due to this risk, you should not be alone for 24 hours after your injury.  Follow up with your regular physician in 3 days. If you are taking a blood thinner, such as aspirin, Pradaxa  (dabigatran), Coumadin  (warfarin), or Plavix  (clopidogrel), you are at especially high risk for immediate or delayed bleeding, and need to re-check with a physician in 24 hours, or sooner if any of the symptoms below happen.     Return to the Emergency Department if:    You are confused, have amnesia, or you are not acting right.    Your headache gets worse or you start to have a really bad headache even with your recommended treatment plan.    You vomit more than once.    You have a convulsion or seizure.    You have trouble walking.    You have weakness or paralysis in an arm or a leg.    You have blood or fluid coming from your  ears or nose.    You have new symptoms or anything that worries you.    Sleeping:  It is okay for you to sleep, but someone should wake you up as instructed by your doctor, and someone should check on you at your usual time to wake up.     Activity:    Do not drive for at least 24 hours.    Do not drive if you have dizzy spells or trouble concentrating, or remembering things.    Do not return to any contact sports until cleared by your regular doctor.     Follow-up:  It is very important that you make an appointment with your clinic and go to the appointment.  If you do not follow-up with your regular doctor, it may result in missing an important development which could result in permanent injury or disability and/or lasting pain.  If there is any problem keeping your appointment, call your doctor or return to the Emergency Department.    MORE INFORMATION:    Concussion:  A concussion is a minor head injury that may cause temporary problems with the way your brain works.  Some symptoms include:  confusion, amnesia, nausea and vomiting, dizziness, fatigue, memory or concentration problems, irritability and sleep problems.    CT Scans: Your evaluation today may have included a CT scan (CAT scan) to look for things like bleeding or a skull fracture (break).  CT scans involve radiation and too many CT scans can cause serious health problems like cancer, especially in children.  Because of this, your doctor may not have ordered a CT scan today if they think you are at low risk for a serious or life threatening problem.    If you were given a prescription for medicine here today, be sure to read all of the information (including the package insert) that comes with your prescription.  This will include important information about the medicine, its side effects, and any warnings that you need to know about.  The pharmacist who fills the prescription can provide more information and answer questions you may have about the  medicine.  If you have questions or concerns that the pharmacist cannot address, please call or return to the Emergency Department.     Opioid Medication Information    Pain medications are among the most commonly prescribed medicines, so we are including this information for all our patients. If you did not receive pain medication or get a prescription for pain medicine, you can ignore it.     You may have been given a prescription for an opioid (narcotic) pain medicine and/or have received a pain medicine while here in the Emergency Department. These medicines can make you drowsy or impaired. You must not drive, operate dangerous equipment, or engage in any other dangerous activities while taking these medications. If you drive while taking these medications, you could be arrested for DUI, or driving under the influence. Do not drink any alcohol while you are taking these medications.     Opioid pain medications can cause addiction. If you have a history of chemical dependency of any type, you are at a higher risk of becoming addicted to pain medications.  Only take these prescribed medications to treat your pain when all other options have been tried. Take it for as short a time and as few doses as possible. Store your pain pills in a secure place, as they are frequently stolen and provide a dangerous opportunity for children or visitors in your house to start abusing these powerful medications. We will not replace any lost or stolen medicine.  As soon as your pain is better, you should flush all your remaining medication.     Many prescription pain medications contain Tylenol  (acetaminophen), including Vicodin , Tylenol #3 , Norco , Lortab , and Percocet .  You should not take any extra pills of Tylenol  if you are using these prescription medications or you can get very sick.  Do not ever take more than 3000 mg of acetaminophen in any 24 hour period.    All opioids tend to cause constipation. Drink plenty of  water and eat foods that have a lot of fiber, such as fruits, vegetables, prune juice, apple juice and high fiber cereal.  Take a laxative if you don t move your bowels at least every other day. Miralax , Milk of Magnesia, Colace , or Senna  can be used to keep you regular.      Remember that you can always come back to the Emergency Department if you are not able to see your regular doctor in the amount of time listed above, if you get any new symptoms, or if there is anything that worries you.      Discharge Instructions  Abdominal Pain    Abdominal pain can be caused by many things. Your evaluation today does not show the exact cause for your pain. Your doctor today has decided that it is unlikely your pain is due to a life threatening problem, or a problem requiring surgery or hospital admission. Sometimes those problems cannot be found right away, so it is very important that you follow up as directed.  Sometimes only the changes which occur over time allow the cause of your pain to be found.    Return to the Emergency Department for a recheck in 8-12 hours if your pain continues.  If your pain gets worse, changes in location, or feels different, return to the Emergency Department right away.    ADULTS:  Return to the Emergency Department right away if:      You get an oral temperature above 102oF or as directed by your doctor.    You have blood in your stools (bright red or black, tarry stools).    You keep throwing up or can t drink liquids.    You see blood when you throw up.    You can t have a bowel movement or you can t pass gas.    Your stomach gets bloated or bigger.    Your skin or the whites of your eyes look yellow.    You faint.    You have bloody, frequent or painful urination.    You have new symptoms or anything that worries you.    CHILDREN:  Return to the Emergency Department right away if your child has any of the above-listed symptoms or the following:      Pushes your hand away or  screams/cries when his/her belly is touched.    You notice your child is very fussy or weak.    Your child is very tired and is too tired to eat or drink.    Your child is dehydrated.  Signs of dehydration can be:  o Your infant has had no wet diapers in 4-5 hours.  o Your older child has not passed urine in 6-8 hours.  o Your infant or child starts to have dry mouth and lips, or no saliva or tears.    PREGNANT WOMEN:  Return to the Emergency Department right away if you have any of the above-listed symptoms or the following:      You have bleeding, leaking fluid or passing tissue from the vagina.    You have worse pain or cramping, or pain in your shoulder or back.    You have vomiting that will not stop.    You have painful or bloody urination.    You have a temperature of 100oF or more.    Your baby is not moving as much as usual.    You faint.    You get a bad headache with or without eye problems and abdominal pain.    You have a convulsion or seizure.    You have unusual discharge from your vagina and abdominal pain.    Abdominal pain is pretty common during pregnancy.  Your pain may or may not be related to your pregnancy. You should follow-up closely with your OB doctor so they can evaluate you and your baby.  Until you follow-up with your regular doctor, do the following:       Avoid sex and do not put anything in your vagina.    Drink clear fluids.    Only take medications approved by your doctor.    MORE INFORMATION:    Appendicitis:  A possible cause of abdominal pain in any person who still has their appendix is acute appendicitis. Appendicitis is often hard to diagnose.  Testing does not always rule out early appendicitis or other causes of abdominal pain. Close follow-up with your doctor and re-evaluations may be needed to figure out the reason for your abdominal pain.    Follow-up:  It is very important that you make an appointment with your clinic and go to the appointment.  If you do not follow-up  "with your primary doctor, it may result in missing an important development which could result in permanent injury or disability and/or lasting pain.  If there is any problem keeping your appointment, call your doctor or return to the Emergency Department.    Medications:  Take your medications as directed by your doctor today.  Before using over-the-counter medications, ask your doctor and make sure to take the medications as directed.  If you have any questions about medications, ask your doctor.    Diet:  Resume your normal diet as much as possible, but do not eat fried, fatty or spicy foods while you have pain.  Do not drink alcohol or have caffeine.  Do not smoke tobacco.    Probiotics: If you have been given an antibiotic, you may want to also take a probiotic pill or eat yogurt with live cultures. Probiotics have \"good bacteria\" to help your intestines stay healthy. Studies have shown that probiotics help prevent diarrhea and other intestine problems (including C. diff infection) when you take antibiotics. You can buy these without a prescription in the pharmacy section of the store.     If you were given a prescription for medicine here today, be sure to read all of the information (including the package insert) that comes with your prescription.  This will include important information about the medicine, its side effects, and any warnings that you need to know about.  The pharmacist who fills the prescription can provide more information and answer questions you may have about the medicine.  If you have questions or concerns that the pharmacist cannot address, please call or return to the Emergency Department.         Opioid Medication Information    Pain medications are among the most commonly prescribed medicines, so we are including this information for all our patients. If you did not receive pain medication or get a prescription for pain medicine, you can ignore it.     You may have been given a " prescription for an opioid (narcotic) pain medicine and/or have received a pain medicine while here in the Emergency Department. These medicines can make you drowsy or impaired. You must not drive, operate dangerous equipment, or engage in any other dangerous activities while taking these medications. If you drive while taking these medications, you could be arrested for DUI, or driving under the influence. Do not drink any alcohol while you are taking these medications.     Opioid pain medications can cause addiction. If you have a history of chemical dependency of any type, you are at a higher risk of becoming addicted to pain medications.  Only take these prescribed medications to treat your pain when all other options have been tried. Take it for as short a time and as few doses as possible. Store your pain pills in a secure place, as they are frequently stolen and provide a dangerous opportunity for children or visitors in your house to start abusing these powerful medications. We will not replace any lost or stolen medicine.  As soon as your pain is better, you should flush all your remaining medication.     Many prescription pain medications contain Tylenol  (acetaminophen), including Vicodin , Tylenol #3 , Norco , Lortab , and Percocet .  You should not take any extra pills of Tylenol  if you are using these prescription medications or you can get very sick.  Do not ever take more than 3000 mg of acetaminophen in any 24 hour period.    All opioids tend to cause constipation. Drink plenty of water and eat foods that have a lot of fiber, such as fruits, vegetables, prune juice, apple juice and high fiber cereal.  Take a laxative if you don t move your bowels at least every other day. Miralax , Milk of Magnesia, Colace , or Senna  can be used to keep you regular.      Remember that you can always come back to the Emergency Department if you are not able to see your regular doctor in the amount of time listed  above, if you get any new symptoms, or if there is anything that worries you.        Discharge Instructions  Vomiting    You have been seen today for vomiting. This is usually caused by a virus, but some bacteria, parasites, medicines or other medical conditions can cause similar symptoms. At this time your doctor does not find that your vomiting is a sign of anything dangerous or life-threatening. However, sometimes the signs of serious illness do not show up right away. If you have new or worse symptoms, you may need to be seen again in the emergency department or by your primary doctor. Remember that serious problems like appendicitis can start as vomiting.     Return to the Emergency Department if:    You keep throwing up and you are not able to keep liquids down.     You feel you are getting dehydrated, such as being very thirsty, not urinating at least every 8-12 hours, or feeling faint or lightheaded.     You develop a new fever, or your fever continues for more than 2 days.     You have belly pain that seems worse than cramps, is in one spot, or is getting worse over time.     You have blood in your vomit or stools.     You feel very weak.    You are not starting to improve within 24 hours of your visit here.     What can I do to help myself?    The most important thing to do is to drink clear liquids. If you have been vomiting a lot, it is best to have only small, frequent sips of liquids. Drinking too much at once may cause more vomiting. If you are vomiting often, you must replace minerals, sodium and potassium lost with your illness. Pedialyte  and sports drinks can help you replace these minerals. You can also drink clear liquids such as water, weak tea, apple juice, and 7-Up . Avoid acid liquids (orange), caffeine (coffee) or alcohol. Do not drink milk until you no longer have diarrhea.     After liquids are staying down, you may start eating mild foods. Soda crackers, toast, plain noodles, gelatin,  applesauce and bananas are good first choices. Avoid foods that have acid, are spicy, fatty or have a lot of fiber (such as meats, coarse grains, vegetables). You may start eating these foods again in about 3 days when you are better.     Sometimes treatment includes prescription medicine to prevent nausea and vomiting. If your doctor prescribes these for you, take them as directed.     Don t take ibuprofen, or other nonsteroidal anti-inflammatory medicines without checking with your healthcare provider.   If you were given a prescription for medicine here today, be sure to read all of the information (including the package insert) that comes with your prescription.  This will include important information about the medicine, its side effects, and any warnings that you need to know about.  The pharmacist who fills the prescription can provide more information and answer questions you may have about the medicine.  If you have questions or concerns that the pharmacist cannot address, please call or return to the Emergency Department.       Opioid Medication Information    Pain medications are among the most commonly prescribed medicines, so we are including this information for all our patients. If you did not receive pain medication or get a prescription for pain medicine, you can ignore it.     You may have been given a prescription for an opioid (narcotic) pain medicine and/or have received a pain medicine while here in the Emergency Department. These medicines can make you drowsy or impaired. You must not drive, operate dangerous equipment, or engage in any other dangerous activities while taking these medications. If you drive while taking these medications, you could be arrested for DUI, or driving under the influence. Do not drink any alcohol while you are taking these medications.     Opioid pain medications can cause addiction. If you have a history of chemical dependency of any type, you are at a higher risk  of becoming addicted to pain medications.  Only take these prescribed medications to treat your pain when all other options have been tried. Take it for as short a time and as few doses as possible. Store your pain pills in a secure place, as they are frequently stolen and provide a dangerous opportunity for children or visitors in your house to start abusing these powerful medications. We will not replace any lost or stolen medicine.  As soon as your pain is better, you should flush all your remaining medication.     Many prescription pain medications contain Tylenol  (acetaminophen), including Vicodin , Tylenol #3 , Norco , Lortab , and Percocet .  You should not take any extra pills of Tylenol  if you are using these prescription medications or you can get very sick.  Do not ever take more than 3000 mg of acetaminophen in any 24 hour period.    All opioids tend to cause constipation. Drink plenty of water and eat foods that have a lot of fiber, such as fruits, vegetables, prune juice, apple juice and high fiber cereal.  Take a laxative if you don t move your bowels at least every other day. Miralax , Milk of Magnesia, Colace , or Senna  can be used to keep you regular.      Remember that you can always come back to the Emergency Department if you are not able to see your regular doctor in the amount of time listed above, if you get any new symptoms, or if there is anything that worries you.            Future Appointments        Provider Department Dept Phone Center    8/2/2017 1:45 PM Mariama Mcallister MD [facility] [phone] [facility]      24 Hour Appointment Hotline       To make an appointment at any [facility], call [phone] ([phone]). If you don't have a family doctor or clinic, we will help you find one. [facility] clinics are conveniently located to serve the needs of you and your family.             Review of your medicines      CONTINUE these medicines which may have  CHANGED, or have new prescriptions. If we are uncertain of the size of tablets/capsules you have at home, strength may be listed as something that might have changed.        Dose / Directions Last dose taken    ondansetron 4 MG ODT tab   Commonly known as:  ZOFRAN ODT   Dose:  4 mg   What changed:  how much to take   Quantity:  10 tablet        Take 1 tablet (4 mg) by mouth every 8 hours as needed for nausea   Refills:  0          Our records show that you are taking the medicines listed below. If these are incorrect, please call your family doctor or clinic.        Dose / Directions Last dose taken    acetaminophen 500 MG tablet   Commonly known as:  TYLENOL   Dose:  500-1000 mg   Quantity:  90 tablet        Take 1-2 tablets (500-1,000 mg) by mouth every 8 hours as needed for mild pain   Refills:  3        docosanol 10 % Crea cream   Commonly known as:  ABREVA   Quantity:  2 g        Apply topically 5 times daily As needed   Refills:  3        ibuprofen 200 MG tablet   Commonly known as:  ADVIL/MOTRIN   Dose:  600 mg   Quantity:  30 tablet        Take 3 tablets (600 mg) by mouth every 8 hours as needed for mild pain   Refills:  0        LAMICTAL 100 MG tablet   Dose:  150 mg   Generic drug:  lamoTRIgine        Take 150 mg by mouth At Bedtime   Refills:  0        levonorgestrel 20 MCG/24HR IUD   Commonly known as:  MIRENA   Dose:  1 each        1 each (20 mcg) by Intrauterine route once for 1 dose   Refills:  0        OLANZapine 5 MG tablet   Commonly known as:  zyPREXA   Quantity:  10 tablet        Take 1 tablet (5 mg) orally at bedtime prn   Refills:  0        oxyCODONE 5 MG IR tablet   Commonly known as:  ROXICODONE   Dose:  5 mg   Quantity:  20 tablet        Take 1 tablet (5 mg) by mouth every 6 hours as needed for pain   Refills:  0        PRISTIQ PO   Dose:  100 mg        Take 100 mg by mouth every morning   Refills:  0        promethazine 25 MG tablet   Commonly known as:  PHENERGAN   Dose:  25 mg   Quantity:   10 tablet        Take 1 tablet (25 mg) by mouth every 6 hours as needed for nausea   Refills:  0        SUMATRIPTAN SUCCINATE PO   Dose:  50 mg        Take 50 mg by mouth once as needed for migraine Reported on 5/19/2017   Refills:  0        TOPAMAX 50 MG tablet   Dose:  25 mg   Generic drug:  topiramate        Take 25 mg by mouth At Bedtime   Refills:  0        VITAMIN D3 PO   Dose:  5000 Units        Take 5,000 Units by mouth every morning   Refills:  0                Prescriptions were sent or printed at these locations (1 Prescription)                   Other Prescriptions                Printed at Department/Unit printer (1 of 1)         ondansetron (ZOFRAN ODT) 4 MG ODT tab                Procedures and tests performed during your visit     Alcohol level blood    CBC with platelets differential    Cervical spine CT w/o contrast    Comprehensive metabolic panel    HCG QUALitative    Head CT w/o contrast    Lipase      Orders Needing Specimen Collection     None      Pending Results     No orders found for last 3 day(s).            Pending Culture Results     No orders found for last 3 day(s).            Pending Results Instructions     If you had any lab results that were not finalized at the time of your Discharge, you can call the ED Lab Result RN at [phone]. You will be contacted by this team for any positive Lab results or changes in treatment. The nurses are available 7 days a week from 10A to 6:30P.  You can leave a message 24 hours per day and they will return your call.        Test Results From Your Hospital Stay              7/29/2017  1:32 AM      Component Results     Component Value Ref Range & Units Status    Sodium 138 133 - 144 mmol/L Final    Potassium 5.0 3.4 - 5.3 mmol/L Final    Specimen moderately hemolyzed, Potassium may be falsely elevated    Chloride 108 94 - 109 mmol/L Final    Carbon Dioxide 24 20 - 32 mmol/L Final    Anion Gap 6 3 - 14 mmol/L Final    Glucose 92 70 - 99 mg/dL  Final    Urea Nitrogen 17 7 - 30 mg/dL Final    Creatinine 0.52 0.52 - 1.04 mg/dL Final    GFR Estimate >90  Non  GFR Calc   >60 mL/min/1.7m2 Final    GFR Estimate If Black >90   GFR Calc   >60 mL/min/1.7m2 Final    Calcium 8.6 8.5 - 10.1 mg/dL Final    Bilirubin Total 0.3 0.2 - 1.3 mg/dL Final    Albumin 3.4 3.4 - 5.0 g/dL Final    Protein Total 6.9 6.8 - 8.8 g/dL Final    Alkaline Phosphatase 71 40 - 150 U/L Final    ALT 29 0 - 50 U/L Final    AST 53 (H) 0 - 45 U/L Final         7/29/2017  1:32 AM      Component Results     Component Value Ref Range & Units Status    Lipase 100 73 - 393 U/L Final         7/29/2017  1:10 AM      Component Results     Component Value Ref Range & Units Status    HCG Qualitative Serum Negative NEG Final         7/29/2017  1:32 AM      Component Results     Component Value Ref Range & Units Status    Ethanol g/dL <0.01 <0.01 g/dL Final         7/29/2017 12:38 AM      Narrative     CT HEAD WITHOUT CONTRAST  7/29/2017 12:11 AM     HISTORY: Injury.    COMPARISON: 1/31/2015.    TECHNIQUE: Without intravenous contrast, helical sections were  acquired through the brain. Coronal reconstructions were generated.  Radiation dose for this scan was reduced using automated exposure  control, adjustment of the mA and/or kV according to the patient's  size, or iterative reconstruction technique.    FINDINGS: No intra-axial mass, mass effect or midline shift. Normal  gray-white matter differentiation. No visualized acute intra-axial  hemorrhage. The cerebral ventricles are normal in caliber. The basal  cisterns are patent. No extra-axial fluid collection. Polypoid mucosal  thickening versus mucous retention cyst in the right maxillary sinus.        Impression     IMPRESSION: No evidence of acute intracranial trauma.    FRANCES JAEGER MD         7/29/2017 12:39 AM      Narrative     CT CERVICAL SPINE WITHOUT CONTRAST  7/29/2017 12:06 AM     HISTORY: Fall. Hit back of  head.    COMPARISON: 4/30/2014.    TECHNIQUE: Without intravenous contrast, helical sections were  acquired through the cervical spine from the skull base through the  bottom of the T2 vertebral body. Coronal and sagittal reconstructions  were generated. Radiation dose for this scan was reduced using  automated exposure control, adjustment of the mA and/or kV according  to the patient's size, or iterative reconstruction technique.    FINDINGS: No visualized acute fracture of the cervical spine.  Straightening of the normal cervical lordosis, possibly related to  patient positioning or muscle spasm. No prevertebral soft tissue  swelling.        Impression     IMPRESSION: No visualized acute fracture of the cervical spine.    FRANCES JAEGER MD         7/29/2017  1:23 AM      Component Results     Component Value Ref Range & Units Status    WBC 9.4 4.0 - 11.0 10e9/L Final    RBC Count 3.88 3.8 - 5.2 10e12/L Final    Hemoglobin 11.5 (L) 11.7 - 15.7 g/dL Final    Hematocrit 35.2 35.0 - 47.0 % Final    MCV 91 78 - 100 fl Final    MCH 29.6 26.5 - 33.0 pg Final    MCHC 32.7 31.5 - 36.5 g/dL Final    RDW 13.6 10.0 - 15.0 % Final    Platelet Count 260 150 - 450 10e9/L Final    Diff Method Automated Method  Final    % Neutrophils 74.1 % Final    % Lymphocytes 16.8 % Final    % Monocytes 6.6 % Final    % Eosinophils 2.0 % Final    % Basophils 0.3 % Final    % Immature Granulocytes 0.2 % Final    Nucleated RBCs 0 0 /100 Final    Absolute Neutrophil 6.9 1.6 - 8.3 10e9/L Final    Absolute Lymphocytes 1.6 0.8 - 5.3 10e9/L Final    Absolute Monocytes 0.6 0.0 - 1.3 10e9/L Final    Absolute Eosinophils 0.2 0.0 - 0.7 10e9/L Final    Absolute Basophils 0.0 0.0 - 0.2 10e9/L Final    Abs Immature Granulocytes 0.0 0 - 0.4 10e9/L Final    Absolute Nucleated RBC 0.0  Final                Clinical Quality Measure: Blood Pressure Screening     Your blood pressure was checked while you were in the emergency department today. The last reading  we obtained was  BP: 160/82 . Please read the guidelines below about what these numbers mean and what you should do about them.  If your systolic blood pressure (the top number) is less than 120 and your diastolic blood pressure (the bottom number) is less than 80, then your blood pressure is normal. There is nothing more that you need to do about it.  If your systolic blood pressure (the top number) is 120-139 or your diastolic blood pressure (the bottom number) is 80-89, your blood pressure may be higher than it should be. You should have your blood pressure rechecked within a year by a primary care provider.  If your systolic blood pressure (the top number) is 140 or greater or your diastolic blood pressure (the bottom number) is 90 or greater, you may have high blood pressure. High blood pressure is treatable, but if left untreated over time it can put you at risk for heart attack, stroke, or kidney failure. You should have your blood pressure rechecked by a primary care provider within the next 4 weeks.  If your provider in the emergency department today gave you specific instructions to follow-up with your doctor or provider even sooner than that, you should follow that instruction and not wait for up to 4 weeks for your follow-up visit.        Thank you for choosing [facility]       Thank you for choosing [facility] for your care. Our goal is always to provide you with excellent care. Hearing back from our patients is one way we can continue to improve our services. Please take a few minutes to complete the written survey that you may receive in the mail after you visit with us. Thank you!        [facility] Information     [facility] gives you secure access to your electronic health record. If you see a primary care provider, you can also send messages to your care team and make appointments. If you have questions, please call your primary care clinic.  If you do not have a primary care provider, please call  [phone] and they will assist you.        Care EveryWhere ID     This is your Care EveryWhere ID. This could be used by other organizations to access your [facility] medical records  [id number]        Equal Access to Services     ZENON PERSAUD: Gabriel Collado, watay singh, qamanuelta kaalmagregory tenorio, mic persaud. So [facility] [phone].    ATENCIÓN: Si habla español, tiene a singh disposición servicios gratuitos de asistencia lingüística. Llame al [phone].    We comply with applicable federal civil rights laws and [place] laws. We do not discriminate on the basis of race, color, national origin, age, disability sex, sexual orientation or gender identity.            After Visit Summary       This is your record. Keep this with you and show to your community pharmacist(s) and doctor(s) at your next visit.

## 2017-07-28 NOTE — ED AVS SNAPSHOT
[facility] Emergency Department    [address]    [facility] [address]    Phone:  [phone]    Fax:  [phone]                                       Nevin Alvarado   MRN: [id number]    Department:  [facility] Emergency Department   Date of Visit:  7/28/2017           After Visit Summary Signature Page     I have received my discharge instructions, and my questions have been answered. I have discussed any challenges I see with this plan with the nurse or doctor.    ..........................................................................................................................................  Patient/Patient Representative Signature      ..........................................................................................................................................  Patient Representative Print Name and Relationship to Patient    ..................................................               ................................................  Date                                            Time    ..........................................................................................................................................  Reviewed by Signature/Title    ...................................................              ..............................................  Date                                                            Time

## 2017-07-29 ENCOUNTER — APPOINTMENT (OUTPATIENT)
Dept: CT IMAGING | Facility: CLINIC | Age: 27
End: 2017-07-29
Attending: EMERGENCY MEDICINE
Payer: MEDICARE

## 2017-07-29 VITALS
HEIGHT: 61 IN | SYSTOLIC BLOOD PRESSURE: 160 MMHG | OXYGEN SATURATION: 96 % | WEIGHT: 220 LBS | TEMPERATURE: 98 F | DIASTOLIC BLOOD PRESSURE: 82 MMHG | BODY MASS INDEX: 41.54 KG/M2 | HEART RATE: 91 BPM | RESPIRATION RATE: 18 BRPM

## 2017-07-29 LAB
ALBUMIN SERPL-MCNC: 3.4 G/DL (ref 3.4–5)
ALP SERPL-CCNC: 71 U/L (ref 40–150)
ALT SERPL W P-5'-P-CCNC: 29 U/L (ref 0–50)
ANION GAP SERPL CALCULATED.3IONS-SCNC: 6 MMOL/L (ref 3–14)
AST SERPL W P-5'-P-CCNC: 53 U/L (ref 0–45)
BASOPHILS # BLD AUTO: 0 10E9/L (ref 0–0.2)
BASOPHILS NFR BLD AUTO: 0.3 %
BILIRUB SERPL-MCNC: 0.3 MG/DL (ref 0.2–1.3)
BUN SERPL-MCNC: 17 MG/DL (ref 7–30)
CALCIUM SERPL-MCNC: 8.6 MG/DL (ref 8.5–10.1)
CHLORIDE SERPL-SCNC: 108 MMOL/L (ref 94–109)
CO2 SERPL-SCNC: 24 MMOL/L (ref 20–32)
CREAT SERPL-MCNC: 0.52 MG/DL (ref 0.52–1.04)
DIFFERENTIAL METHOD BLD: ABNORMAL
EOSINOPHIL # BLD AUTO: 0.2 10E9/L (ref 0–0.7)
EOSINOPHIL NFR BLD AUTO: 2 %
ERYTHROCYTE [DISTWIDTH] IN BLOOD BY AUTOMATED COUNT: 13.6 % (ref 10–15)
ETHANOL SERPL-MCNC: <0.01 G/DL
GFR SERPL CREATININE-BSD FRML MDRD: ABNORMAL ML/MIN/1.7M2
GLUCOSE SERPL-MCNC: 92 MG/DL (ref 70–99)
HCG SERPL QL: NEGATIVE
HCT VFR BLD AUTO: 35.2 % (ref 35–47)
HGB BLD-MCNC: 11.5 G/DL (ref 11.7–15.7)
IMM GRANULOCYTES # BLD: 0 10E9/L (ref 0–0.4)
IMM GRANULOCYTES NFR BLD: 0.2 %
LIPASE SERPL-CCNC: 100 U/L (ref 73–393)
LYMPHOCYTES # BLD AUTO: 1.6 10E9/L (ref 0.8–5.3)
LYMPHOCYTES NFR BLD AUTO: 16.8 %
MCH RBC QN AUTO: 29.6 PG (ref 26.5–33)
MCHC RBC AUTO-ENTMCNC: 32.7 G/DL (ref 31.5–36.5)
MCV RBC AUTO: 91 FL (ref 78–100)
MONOCYTES # BLD AUTO: 0.6 10E9/L (ref 0–1.3)
MONOCYTES NFR BLD AUTO: 6.6 %
NEUTROPHILS # BLD AUTO: 6.9 10E9/L (ref 1.6–8.3)
NEUTROPHILS NFR BLD AUTO: 74.1 %
NRBC # BLD AUTO: 0 10*3/UL
NRBC BLD AUTO-RTO: 0 /100
PLATELET # BLD AUTO: 260 10E9/L (ref 150–450)
POTASSIUM SERPL-SCNC: 5 MMOL/L (ref 3.4–5.3)
PROT SERPL-MCNC: 6.9 G/DL (ref 6.8–8.8)
RBC # BLD AUTO: 3.88 10E12/L (ref 3.8–5.2)
SODIUM SERPL-SCNC: 138 MMOL/L (ref 133–144)
WBC # BLD AUTO: 9.4 10E9/L (ref 4–11)

## 2017-07-29 PROCEDURE — 80053 COMPREHEN METABOLIC PANEL: CPT | Performed by: EMERGENCY MEDICINE

## 2017-07-29 PROCEDURE — 83690 ASSAY OF LIPASE: CPT | Performed by: EMERGENCY MEDICINE

## 2017-07-29 PROCEDURE — 25000128 H RX IP 250 OP 636: Performed by: EMERGENCY MEDICINE

## 2017-07-29 PROCEDURE — 72125 CT NECK SPINE W/O DYE: CPT

## 2017-07-29 PROCEDURE — 96361 HYDRATE IV INFUSION ADD-ON: CPT

## 2017-07-29 PROCEDURE — 85025 COMPLETE CBC W/AUTO DIFF WBC: CPT | Performed by: EMERGENCY MEDICINE

## 2017-07-29 PROCEDURE — 25000308 HC RX OP HPI UCR WEL MED 250 IP 250: Performed by: EMERGENCY MEDICINE

## 2017-07-29 PROCEDURE — 70450 CT HEAD/BRAIN W/O DYE: CPT

## 2017-07-29 PROCEDURE — 84703 CHORIONIC GONADOTROPIN ASSAY: CPT | Performed by: EMERGENCY MEDICINE

## 2017-07-29 PROCEDURE — 80320 DRUG SCREEN QUANTALCOHOLS: CPT | Performed by: EMERGENCY MEDICINE

## 2017-07-29 PROCEDURE — 96374 THER/PROPH/DIAG INJ IV PUSH: CPT

## 2017-07-29 RX ORDER — ONDANSETRON 4 MG/1
4 TABLET, ORALLY DISINTEGRATING ORAL EVERY 8 HOURS PRN
Qty: 10 TABLET | Refills: 0 | Status: SHIPPED | OUTPATIENT
Start: 2017-07-29 | End: 2017-08-01

## 2017-07-29 RX ORDER — LIDOCAINE HYDROCHLORIDE 40 MG/ML
1 INJECTION, SOLUTION RETROBULBAR ONCE
Status: COMPLETED | OUTPATIENT
Start: 2017-07-29 | End: 2017-07-29

## 2017-07-29 RX ADMIN — ONDANSETRON 4 MG: 2 INJECTION INTRAMUSCULAR; INTRAVENOUS at 00:33

## 2017-07-29 RX ADMIN — LIDOCAINE HYDROCHLORIDE 1 ML: 40 INJECTION, SOLUTION RETROBULBAR; TOPICAL at 00:59

## 2017-07-29 RX ADMIN — SODIUM CHLORIDE 1000 ML: 9 INJECTION, SOLUTION INTRAVENOUS at 00:33

## 2017-07-29 ASSESSMENT — ENCOUNTER SYMPTOMS
NECK PAIN: 1
NAUSEA: 1
ABDOMINAL PAIN: 1
VOMITING: 1
HEADACHES: 1
DIZZINESS: 1

## 2017-07-29 NOTE — ED NOTES
Bed: ED08  Expected date: 7/28/17  Expected time: 11:24 PM  Means of arrival: Ambulance  Comments:  [place]

## 2017-07-29 NOTE — DISCHARGE INSTRUCTIONS
Discharge Instructions  Head Injury    You have been seen today for a head injury. You were checked for serious problems, like bleeding on the brain, but these problems cannot always be found right away.  Due to this risk, you should not be alone for 24 hours after your injury.  Follow up with your regular physician in 3 days. If you are taking a blood thinner, such as aspirin, Pradaxa  (dabigatran), Coumadin  (warfarin), or Plavix  (clopidogrel), you are at especially high risk for immediate or delayed bleeding, and need to re-check with a physician in 24 hours, or sooner if any of the symptoms below happen.     Return to the Emergency Department if:    You are confused, have amnesia, or you are not acting right.    Your headache gets worse or you start to have a really bad headache even with your recommended treatment plan.    You vomit more than once.    You have a convulsion or seizure.    You have trouble walking.    You have weakness or paralysis in an arm or a leg.    You have blood or fluid coming from your ears or nose.    You have new symptoms or anything that worries you.    Sleeping:  It is okay for you to sleep, but someone should wake you up as instructed by your doctor, and someone should check on you at your usual time to wake up.     Activity:    Do not drive for at least 24 hours.    Do not drive if you have dizzy spells or trouble concentrating, or remembering things.    Do not return to any contact sports until cleared by your regular doctor.     Follow-up:  It is very important that you make an appointment with your clinic and go to the appointment.  If you do not follow-up with your regular doctor, it may result in missing an important development which could result in permanent injury or disability and/or lasting pain.  If there is any problem keeping your appointment, call your doctor or return to the Emergency Department.    MORE INFORMATION:    Concussion:  A concussion is a minor head  injury that may cause temporary problems with the way your brain works.  Some symptoms include:  confusion, amnesia, nausea and vomiting, dizziness, fatigue, memory or concentration problems, irritability and sleep problems.    CT Scans: Your evaluation today may have included a CT scan (CAT scan) to look for things like bleeding or a skull fracture (break).  CT scans involve radiation and too many CT scans can cause serious health problems like cancer, especially in children.  Because of this, your doctor may not have ordered a CT scan today if they think you are at low risk for a serious or life threatening problem.    If you were given a prescription for medicine here today, be sure to read all of the information (including the package insert) that comes with your prescription.  This will include important information about the medicine, its side effects, and any warnings that you need to know about.  The pharmacist who fills the prescription can provide more information and answer questions you may have about the medicine.  If you have questions or concerns that the pharmacist cannot address, please call or return to the Emergency Department.     Opioid Medication Information    Pain medications are among the most commonly prescribed medicines, so we are including this information for all our patients. If you did not receive pain medication or get a prescription for pain medicine, you can ignore it.     You may have been given a prescription for an opioid (narcotic) pain medicine and/or have received a pain medicine while here in the Emergency Department. These medicines can make you drowsy or impaired. You must not drive, operate dangerous equipment, or engage in any other dangerous activities while taking these medications. If you drive while taking these medications, you could be arrested for DUI, or driving under the influence. Do not drink any alcohol while you are taking these medications.     Opioid pain  medications can cause addiction. If you have a history of chemical dependency of any type, you are at a higher risk of becoming addicted to pain medications.  Only take these prescribed medications to treat your pain when all other options have been tried. Take it for as short a time and as few doses as possible. Store your pain pills in a secure place, as they are frequently stolen and provide a dangerous opportunity for children or visitors in your house to start abusing these powerful medications. We will not replace any lost or stolen medicine.  As soon as your pain is better, you should flush all your remaining medication.     Many prescription pain medications contain Tylenol  (acetaminophen), including Vicodin , Tylenol #3 , Norco , Lortab , and Percocet .  You should not take any extra pills of Tylenol  if you are using these prescription medications or you can get very sick.  Do not ever take more than 3000 mg of acetaminophen in any 24 hour period.    All opioids tend to cause constipation. Drink plenty of water and eat foods that have a lot of fiber, such as fruits, vegetables, prune juice, apple juice and high fiber cereal.  Take a laxative if you don t move your bowels at least every other day. Miralax , Milk of Magnesia, Colace , or Senna  can be used to keep you regular.      Remember that you can always come back to the Emergency Department if you are not able to see your regular doctor in the amount of time listed above, if you get any new symptoms, or if there is anything that worries you.      Discharge Instructions  Abdominal Pain    Abdominal pain can be caused by many things. Your evaluation today does not show the exact cause for your pain. Your doctor today has decided that it is unlikely your pain is due to a life threatening problem, or a problem requiring surgery or hospital admission. Sometimes those problems cannot be found right away, so it is very important that you follow up as  directed.  Sometimes only the changes which occur over time allow the cause of your pain to be found.    Return to the Emergency Department for a recheck in 8-12 hours if your pain continues.  If your pain gets worse, changes in location, or feels different, return to the Emergency Department right away.    ADULTS:  Return to the Emergency Department right away if:      You get an oral temperature above 102oF or as directed by your doctor.    You have blood in your stools (bright red or black, tarry stools).    You keep throwing up or can t drink liquids.    You see blood when you throw up.    You can t have a bowel movement or you can t pass gas.    Your stomach gets bloated or bigger.    Your skin or the whites of your eyes look yellow.    You faint.    You have bloody, frequent or painful urination.    You have new symptoms or anything that worries you.    CHILDREN:  Return to the Emergency Department right away if your child has any of the above-listed symptoms or the following:      Pushes your hand away or screams/cries when his/her belly is touched.    You notice your child is very fussy or weak.    Your child is very tired and is too tired to eat or drink.    Your child is dehydrated.  Signs of dehydration can be:  o Your infant has had no wet diapers in 4-5 hours.  o Your older child has not passed urine in 6-8 hours.  o Your infant or child starts to have dry mouth and lips, or no saliva or tears.    PREGNANT WOMEN:  Return to the Emergency Department right away if you have any of the above-listed symptoms or the following:      You have bleeding, leaking fluid or passing tissue from the vagina.    You have worse pain or cramping, or pain in your shoulder or back.    You have vomiting that will not stop.    You have painful or bloody urination.    You have a temperature of 100oF or more.    Your baby is not moving as much as usual.    You faint.    You get a bad headache with or without eye problems and  "abdominal pain.    You have a convulsion or seizure.    You have unusual discharge from your vagina and abdominal pain.    Abdominal pain is pretty common during pregnancy.  Your pain may or may not be related to your pregnancy. You should follow-up closely with your OB doctor so they can evaluate you and your baby.  Until you follow-up with your regular doctor, do the following:       Avoid sex and do not put anything in your vagina.    Drink clear fluids.    Only take medications approved by your doctor.    MORE INFORMATION:    Appendicitis:  A possible cause of abdominal pain in any person who still has their appendix is acute appendicitis. Appendicitis is often hard to diagnose.  Testing does not always rule out early appendicitis or other causes of abdominal pain. Close follow-up with your doctor and re-evaluations may be needed to figure out the reason for your abdominal pain.    Follow-up:  It is very important that you make an appointment with your clinic and go to the appointment.  If you do not follow-up with your primary doctor, it may result in missing an important development which could result in permanent injury or disability and/or lasting pain.  If there is any problem keeping your appointment, call your doctor or return to the Emergency Department.    Medications:  Take your medications as directed by your doctor today.  Before using over-the-counter medications, ask your doctor and make sure to take the medications as directed.  If you have any questions about medications, ask your doctor.    Diet:  Resume your normal diet as much as possible, but do not eat fried, fatty or spicy foods while you have pain.  Do not drink alcohol or have caffeine.  Do not smoke tobacco.    Probiotics: If you have been given an antibiotic, you may want to also take a probiotic pill or eat yogurt with live cultures. Probiotics have \"good bacteria\" to help your intestines stay healthy. Studies have shown that " probiotics help prevent diarrhea and other intestine problems (including C. diff infection) when you take antibiotics. You can buy these without a prescription in the pharmacy section of the store.     If you were given a prescription for medicine here today, be sure to read all of the information (including the package insert) that comes with your prescription.  This will include important information about the medicine, its side effects, and any warnings that you need to know about.  The pharmacist who fills the prescription can provide more information and answer questions you may have about the medicine.  If you have questions or concerns that the pharmacist cannot address, please call or return to the Emergency Department.         Opioid Medication Information    Pain medications are among the most commonly prescribed medicines, so we are including this information for all our patients. If you did not receive pain medication or get a prescription for pain medicine, you can ignore it.     You may have been given a prescription for an opioid (narcotic) pain medicine and/or have received a pain medicine while here in the Emergency Department. These medicines can make you drowsy or impaired. You must not drive, operate dangerous equipment, or engage in any other dangerous activities while taking these medications. If you drive while taking these medications, you could be arrested for DUI, or driving under the influence. Do not drink any alcohol while you are taking these medications.     Opioid pain medications can cause addiction. If you have a history of chemical dependency of any type, you are at a higher risk of becoming addicted to pain medications.  Only take these prescribed medications to treat your pain when all other options have been tried. Take it for as short a time and as few doses as possible. Store your pain pills in a secure place, as they are frequently stolen and provide a dangerous opportunity  for children or visitors in your house to start abusing these powerful medications. We will not replace any lost or stolen medicine.  As soon as your pain is better, you should flush all your remaining medication.     Many prescription pain medications contain Tylenol  (acetaminophen), including Vicodin , Tylenol #3 , Norco , Lortab , and Percocet .  You should not take any extra pills of Tylenol  if you are using these prescription medications or you can get very sick.  Do not ever take more than 3000 mg of acetaminophen in any 24 hour period.    All opioids tend to cause constipation. Drink plenty of water and eat foods that have a lot of fiber, such as fruits, vegetables, prune juice, apple juice and high fiber cereal.  Take a laxative if you don t move your bowels at least every other day. Miralax , Milk of Magnesia, Colace , or Senna  can be used to keep you regular.      Remember that you can always come back to the Emergency Department if you are not able to see your regular doctor in the amount of time listed above, if you get any new symptoms, or if there is anything that worries you.        Discharge Instructions  Vomiting    You have been seen today for vomiting. This is usually caused by a virus, but some bacteria, parasites, medicines or other medical conditions can cause similar symptoms. At this time your doctor does not find that your vomiting is a sign of anything dangerous or life-threatening. However, sometimes the signs of serious illness do not show up right away. If you have new or worse symptoms, you may need to be seen again in the emergency department or by your primary doctor. Remember that serious problems like appendicitis can start as vomiting.     Return to the Emergency Department if:    You keep throwing up and you are not able to keep liquids down.     You feel you are getting dehydrated, such as being very thirsty, not urinating at least every 8-12 hours, or feeling faint or  lightheaded.     You develop a new fever, or your fever continues for more than 2 days.     You have belly pain that seems worse than cramps, is in one spot, or is getting worse over time.     You have blood in your vomit or stools.     You feel very weak.    You are not starting to improve within 24 hours of your visit here.     What can I do to help myself?    The most important thing to do is to drink clear liquids. If you have been vomiting a lot, it is best to have only small, frequent sips of liquids. Drinking too much at once may cause more vomiting. If you are vomiting often, you must replace minerals, sodium and potassium lost with your illness. Pedialyte  and sports drinks can help you replace these minerals. You can also drink clear liquids such as water, weak tea, apple juice, and 7-Up . Avoid acid liquids (orange), caffeine (coffee) or alcohol. Do not drink milk until you no longer have diarrhea.     After liquids are staying down, you may start eating mild foods. Soda crackers, toast, plain noodles, gelatin, applesauce and bananas are good first choices. Avoid foods that have acid, are spicy, fatty or have a lot of fiber (such as meats, coarse grains, vegetables). You may start eating these foods again in about 3 days when you are better.     Sometimes treatment includes prescription medicine to prevent nausea and vomiting. If your doctor prescribes these for you, take them as directed.     Don t take ibuprofen, or other nonsteroidal anti-inflammatory medicines without checking with your healthcare provider.   If you were given a prescription for medicine here today, be sure to read all of the information (including the package insert) that comes with your prescription.  This will include important information about the medicine, its side effects, and any warnings that you need to know about.  The pharmacist who fills the prescription can provide more information and answer questions you may have about  the medicine.  If you have questions or concerns that the pharmacist cannot address, please call or return to the Emergency Department.       Opioid Medication Information    Pain medications are among the most commonly prescribed medicines, so we are including this information for all our patients. If you did not receive pain medication or get a prescription for pain medicine, you can ignore it.     You may have been given a prescription for an opioid (narcotic) pain medicine and/or have received a pain medicine while here in the Emergency Department. These medicines can make you drowsy or impaired. You must not drive, operate dangerous equipment, or engage in any other dangerous activities while taking these medications. If you drive while taking these medications, you could be arrested for DUI, or driving under the influence. Do not drink any alcohol while you are taking these medications.     Opioid pain medications can cause addiction. If you have a history of chemical dependency of any type, you are at a higher risk of becoming addicted to pain medications.  Only take these prescribed medications to treat your pain when all other options have been tried. Take it for as short a time and as few doses as possible. Store your pain pills in a secure place, as they are frequently stolen and provide a dangerous opportunity for children or visitors in your house to start abusing these powerful medications. We will not replace any lost or stolen medicine.  As soon as your pain is better, you should flush all your remaining medication.     Many prescription pain medications contain Tylenol  (acetaminophen), including Vicodin , Tylenol #3 , Norco , Lortab , and Percocet .  You should not take any extra pills of Tylenol  if you are using these prescription medications or you can get very sick.  Do not ever take more than 3000 mg of acetaminophen in any 24 hour period.    All opioids tend to cause constipation. Drink  plenty of water and eat foods that have a lot of fiber, such as fruits, vegetables, prune juice, apple juice and high fiber cereal.  Take a laxative if you don t move your bowels at least every other day. Miralax , Milk of Magnesia, Colace , or Senna  can be used to keep you regular.      Remember that you can always come back to the Emergency Department if you are not able to see your regular doctor in the amount of time listed above, if you get any new symptoms, or if there is anything that worries you.

## 2017-07-29 NOTE — ED PROVIDER NOTES
History     Chief Complaint:  Loss of Consciousness and Nausea & Vomiting      HPI   Nevin Alvarado is a 26 year old female who presents in a c-collar via ambulance for evaluation of nausea, vomiting, and recent loss of consciousness with subsequent fall and head injury. Per paramedic report, the patient was found down on the ground in the back isle of a gas station in [place]. She was being driven home by her boyfriend and they had pulled over so the patient could vomit. Patient denied to paramedics that she was taking any drugs, but did complain of head pain. Her blood glucose was 93 mg/dl and her BP was in the 150's systolic. Her heart rate was noted to be regular. The patient also shared with the paramedics that she was having sex with her boyfriend right before they left to drive her home. She was also complaining of abdominal pain prior to hospital arrival.     Here, the patient reports that she currently feels nauseous. She reports that the nausea began after she had sexual intercourse with her boyfriend. She asked him to pull over and then she went into the gas station to vomit. After she left the bathroom, she became dizzy and then had a syncopal episode and fell to the ground. She denies having a headache prior to falling, but does complain of head pain post fall. She also is complaining of some neck pain. Patient reports that the sexual intercourse with her boyfriend was consensual. She denies any further concerns.    Allergies:  Augmentin - Swelling  Blood-Group Specific Substance: Patient has an anti-Cw and non-specific antibodies. Blood product orders may be delayed. Draw one red top and two purple top tubes for all type/screen/crossmatch orders.  Hydrocodone - Nausea and Vomiting  Influenza Vaccines   Oseltamivir - Hives  Vicodin [Hydrocodone-Acetaminophen] - Nausea and Vomiting  Cefazolin - Rash  Cephalosporins - Rash     Medications:    Promethazine (PHENERGAN) 25 MG tablet  Ibuprofen  "(ADVIL/MOTRIN) 200 MG tablet  Olanzapine (ZYPREXA) 5 MG tablet  Oxycodone (ROXICODONE) 5 MG IR tablet  Docosanol (ABREVA) 10 % CREA cream  Topiramate (TOPAMAX) 50 MG tablet  Ondansetron (ZOFRAN ODT) 4 MG ODT tab  Lamotrigine (LAMICTAL) 100 MG tablet  Acetaminophen (TYLENOL) 500 MG tablet  Levonorgestrel (MIRENA) 20 MCG/24HR IUD  Sumatriptan Succinate Po  Desvenlafaxine Succinate (PRISTIQ PO)  Cholecalciferol (VITAMIN D3 PO)    Past Medical History:    ADD (attention deficit disorder)  Anorexia nervosa with bulimia  Anxiety  Borderline personality disorder  Depression  H/O self-harm  H/O swallowed foreign body  Lives in independent group home  Migraine without aura  Morbid obesity (H)  PTSD (post-traumatic stress disorder)  Rectal foreign body  Suicidal ideation  Suicidal intent  Syncope    Past Surgical History:    EGD x 4  Knee surgery, right; remove small tissue mass from knee  Laparoscopic ablation endometriosis  Exploratory laparotomy  Lymph nodes removed from neck, age 7, benign  Bilateral mammoplasty reduction  Bilateral carpal tunnel release  Sigmoidoscopy flexible    Family History:  Father - cerebrovascular disease  Paternal grandmother - type 2 diabetes, breast cancer  Maternal grandmother - type 2 diabetes    Social History:  Marital status: Significant other  Tobacco use: never  Alcohol use: no  Patient presents to the ED via ambulance.        Review of Systems   HENT:        Positive for head injury secondary to fall from standing height.   Gastrointestinal: Positive for abdominal pain, nausea and vomiting.   Musculoskeletal: Positive for neck pain (c-collar in place).   Neurological: Positive for dizziness, syncope and headaches (post fall).   All other systems reviewed and are negative.    Physical Exam   First Vitals:  BP (!) 150/92  Pulse 91  Temp 98  F (36.7  C) (Oral)  Resp 18  Ht 1.549 m (5' 1\")  Wt 99.8 kg (220 lb)  LMP 07/16/2017  BMI 41.57 kg/m2    Patient Vitals for the past 24 hrs:   " "BP Temp Temp src Pulse Resp Height Weight   07/28/17 2339 (!) 150/92 98  F (36.7  C) Oral 91 18 1.549 m (5' 1\") 99.8 kg (220 lb)      Physical Exam  Constitutional:  Oriented to person, place, and time. Minor distress.  HENT:   Head:    Normocephalic. Atraumatic.  Ears:    Bilateral TM's clear.  Mouth/Throat:   Oropharynx is clear and moist.   Eyes:    EOM are normal. Pupils are equal, round, and reactive to light.   Neck:    Neck supple.   Cardiovascular:  Normal rate, regular rhythm and normal heart sounds.      Exam reveals no gallop and no friction rub.       No murmur heard.  Pulmonary/Chest:  Effort normal and breath sounds normal.      No respiratory distress. No wheezes. No rales.      No reproducible chest wall pain.  Abdominal:   Soft. No distension. No tenderness. No rebound and no guarding.   Musculoskeletal:  Generalized cervical tenderness.  Neurological:   Alert and oriented to person, place, and time.      GCS 15          Moves all 4 extremities spontaneously    Skin:    No rash noted. No pallor.      Emergency Department Course     Imaging:  Radiographic findings were communicated with the patient who voiced understanding of the findings.    CT head w/o contrast:   IMPRESSION: No evidence of acute intracranial trauma.  FRANCES JAEGER MD    Cervical Spine CT w/o Contrast:  IMPRESSION: No visualized acute fracture of the cervical spine.  FRANCES JAEGER MD    Laboratory:  CMP: AST 53 (H), o/w WNL (Cr 0.52)  CBC: HGB 11.5 (L), o/w WNL (WBC 9.4, )     Lipase: 100 (WNL)     HCG qualitative urine: Negative    ETOH: <0.01 (WNL)     Interventions:  23:55 Percocet (5-325 mg) 1 tablet PO  00:33 Normal saline 1,000 mL; 1 L/hr, IV drip   Zofran 4 mg, IV  00:59 Lidocaine 4% 1 mL, nasal    The patient's symptoms were not improved with parenteral narcotics.    Emergency Department Course:  23:39 Nursing notes and vitals reviewed. I obtained a history from the patient and paramedics. I performed an exam of " the patient as documented above. GCS 15. We discussed the plan of care including imaging and laboratory studies.     I personally reviewed the laboratory results with the Patient and answered all related questions prior to discharge.     Findings and plan explained to the Patient. Patient discharged home with instructions regarding supportive care, medications, and reasons to return. The importance of close follow-up was reviewed. The patient was prescribed Zofran.     Impression & Plan      Medical Decision Making:  Nevin Alvarado is a 26 year old female who came in complaining of vomiting, dizziness, and headache which occurred after she fell and hit her head. I did end up getting a CT of her head and cervical spine which does not show any acute findings. Labwork is otherwise reassuring. The patient is complaining of her chronic abdominal pain which is not changed and she otherwise has a benign abdomen. She does not have an elevated white count to suggest new pathology that would require imaging. At this time, with her improved symptoms I do believe she is safe and appropriate for discharge. She was told to follow up with her PMD and return for any worsening headache, nausea, vomiting, or any new symptoms or concerns.     Diagnosis:    ICD-10-CM    1. Contusion of head, unspecified part of head, initial encounter S00.93XA    2. Nausea and vomiting, intractability of vomiting not specified, unspecified vomiting type R11.2    3. Chronic abdominal pain R10.9     G89.29        Disposition:  discharged to home    Discharge Medications:  Discharge Medication List as of 7/29/2017  1:44 AM   ondansetron (ZOFRAN ODT) 4 MG ODT tab - Take 1 tablet (4 mg) by mouth every 8 hours as needed for nausea         I, Delia Quach, am serving as a scribe on 7/28/2017 at 11:39 PM to personally document services performed by Asher Craft MD, based on my observations and the provider's statements to me.        7/28/2017    [facility] EMERGENCY DEPARTMENT       Asher Craft MD  07/29/17 9844

## 2017-07-29 NOTE — ED NOTES
[place] [employer] [place] d/t n/v  In per ems c collar  Blood sugar 93    States she fell and hit the back of head    Still feeling nauseated   Abc intact    Abc intact

## 2017-07-30 ENCOUNTER — HOSPITAL ENCOUNTER (EMERGENCY)
Facility: CLINIC | Age: 27
Discharge: HOME | End: 2017-07-31
Attending: PHYSICIAN ASSISTANT | Admitting: PHYSICIAN ASSISTANT
Payer: MEDICARE

## 2017-07-30 DIAGNOSIS — B37.31 CANDIDIASIS OF VULVA AND VAGINA: ICD-10-CM

## 2017-07-30 DIAGNOSIS — R10.31 RLQ ABDOMINAL PAIN: ICD-10-CM

## 2017-07-30 PROCEDURE — 96375 TX/PRO/DX INJ NEW DRUG ADDON: CPT | Performed by: PHYSICIAN ASSISTANT

## 2017-07-30 PROCEDURE — 84703 CHORIONIC GONADOTROPIN ASSAY: CPT | Performed by: PHYSICIAN ASSISTANT

## 2017-07-30 PROCEDURE — 99284 EMERGENCY DEPT VISIT MOD MDM: CPT | Mod: Z6 | Performed by: PHYSICIAN ASSISTANT

## 2017-07-30 PROCEDURE — 81003 URINALYSIS AUTO W/O SCOPE: CPT | Performed by: PHYSICIAN ASSISTANT

## 2017-07-30 PROCEDURE — 96361 HYDRATE IV INFUSION ADD-ON: CPT | Performed by: PHYSICIAN ASSISTANT

## 2017-07-30 PROCEDURE — 96374 THER/PROPH/DIAG INJ IV PUSH: CPT | Performed by: PHYSICIAN ASSISTANT

## 2017-07-30 PROCEDURE — 99285 EMERGENCY DEPT VISIT HI MDM: CPT | Mod: 25 | Performed by: PHYSICIAN ASSISTANT

## 2017-07-30 PROCEDURE — 96376 TX/PRO/DX INJ SAME DRUG ADON: CPT | Performed by: PHYSICIAN ASSISTANT

## 2017-07-30 NOTE — ED AVS SNAPSHOT
[facility] Emergency Department    [address] [place] [address]    Phone:  [phone]    Fax:  [phone]                                       Nevin Alvarado   MRN: [id number]    Department:  [facility] Emergency Department   Date of Visit:  7/30/2017           Patient Information     Date Of Birth          1990        Your diagnoses for this visit were:     RLQ abdominal pain     Candidiasis of vulva and vagina        You were seen by Jeffrey Burns PA-C.      Follow-up Information     Follow up with Sandra Ramos MD.    Specialty:  Internal Medicine    Why:  As needed, If symptoms worsen or do not improve    Contact information:    [facility]  [address]  [facility] [address]  [phone]          Discharge Instructions       1.  Please do not use the vibrator again as this caused you a lot of pain.  Thankfully, nothing serious was wrong.  2.  Please take the Diflucan for the yeast infection.    3.  It is okay to use the Oxycodone as needed for breakthrough pain if Ibuprofen 800 mg every 8 hours does not treat your pain.  Try taking 1,000 mg of Tylenol every 6 hours as well.      Future Appointments        Provider Department Dept Phone Center    8/2/2017 1:45 PM Mariama Mcallister MD [facility] [phone] [facility] CLIN      24 Hour Appointment Hotline       To make an appointment at any [facility], call [phone] ([phone]). If you don't have a family doctor or clinic, we will help you find one. [facility] are conveniently located to serve the needs of you and your family.             Review of your medicines      START taking        Dose / Directions Last dose taken    fluconazole 150 MG tablet   Commonly known as:  DIFLUCAN   Quantity:  2 tablet        Take one tablet now, and one tablet in three days   Refills:  0          Our records show that you are taking the medicines listed below. If  these are incorrect, please call your family doctor or clinic.        Dose / Directions Last dose taken    acetaminophen 500 MG tablet   Commonly known as:  TYLENOL   Dose:  500-1000 mg   Quantity:  90 tablet        Take 1-2 tablets (500-1,000 mg) by mouth every 8 hours as needed for mild pain   Refills:  3        docosanol 10 % Crea cream   Commonly known as:  ABREVA   Quantity:  2 g        Apply topically 5 times daily As needed   Refills:  3        ibuprofen 200 MG tablet   Commonly known as:  ADVIL/MOTRIN   Dose:  600 mg   Quantity:  30 tablet        Take 3 tablets (600 mg) by mouth every 8 hours as needed for mild pain   Refills:  0        LAMICTAL 100 MG tablet   Dose:  150 mg   Generic drug:  lamoTRIgine        Take 150 mg by mouth At Bedtime   Refills:  0        levonorgestrel 20 MCG/24HR IUD   Commonly known as:  MIRENA   Dose:  1 each        1 each (20 mcg) by Intrauterine route once for 1 dose   Refills:  0        OLANZapine 5 MG tablet   Commonly known as:  zyPREXA   Quantity:  10 tablet        Take 1 tablet (5 mg) orally at bedtime prn   Refills:  0        ondansetron 4 MG ODT tab   Commonly known as:  ZOFRAN ODT   Dose:  4 mg   Quantity:  10 tablet        Take 1 tablet (4 mg) by mouth every 8 hours as needed for nausea   Refills:  0        oxyCODONE 5 MG IR tablet   Commonly known as:  ROXICODONE   Dose:  5 mg   Quantity:  5 tablet        Take 1 tablet (5 mg) by mouth every 6 hours as needed for pain   Refills:  0        PRISTIQ PO   Dose:  100 mg        Take 100 mg by mouth every morning   Refills:  0        promethazine 25 MG tablet   Commonly known as:  PHENERGAN   Dose:  25 mg   Quantity:  10 tablet        Take 1 tablet (25 mg) by mouth every 6 hours as needed for nausea   Refills:  0        SUMATRIPTAN SUCCINATE PO   Dose:  50 mg        Take 50 mg by mouth once as needed for migraine Reported on 5/19/2017   Refills:  0        TOPAMAX 50 MG tablet   Dose:  25 mg   Generic drug:  topiramate         Take 25 mg by mouth At Bedtime   Refills:  0        VITAMIN D3 PO   Dose:  5000 Units        Take 5,000 Units by mouth every morning   Refills:  0                Prescriptions were sent or printed at these locations (2 Prescriptions)                   [facility]   [address] [address]    Telephone:  [phone]   Fax:  [phone]   Hours:                  Printed at Department/Unit printer (1 of 2)         oxyCODONE (ROXICODONE) 5 MG IR tablet                 These medications are not ready yet because we are checking if your insurance will help you pay for them. Call your pharmacy to confirm that your medication is ready for pickup. It may take up to 24 hours for them to receive the prescription. If the prescription is not ready within 3 business days, please contact your clinic or your provider (1 of 2)         fluconazole (DIFLUCAN) 150 MG tablet                Procedures and tests performed during your visit     CBC with platelets differential    Chlamydia trachomatis PCR    Comprehensive metabolic panel    HCG qualitative    Neisseria gonorrhoea PCR    UA reflex to Microscopic and Culture    US Pelvic Complete w Transvaginal & Abd/Pel Duplex Limited    Wet prep      Orders Needing Specimen Collection     None      Pending Results     Date and Time Order Name Status Description    7/31/2017 0019 US Pelvic Complete w Transvaginal & Abd/Pel Duplex Limited Preliminary     7/31/2017 0012 Neisseria gonorrhoea PCR In process     7/31/2017 0012 Chlamydia trachomatis PCR In process             Pending Culture Results     Date and Time Order Name Status Description    7/31/2017 0012 Neisseria gonorrhoea PCR In process     7/31/2017 0012 Chlamydia trachomatis PCR In process             Pending Results Instructions     If you had any lab results that were not finalized at the time of your Discharge, you can call the ED Lab Result RN at [phone]. You will be contacted  by this team for any positive Lab results or changes in treatment. The nurses are available 7 days a week from 10A to 6:30P.  You can leave a message 24 hours per day and they will return your call.        Thank you for choosing [facility]       Thank you for choosing [facility] for your care. Our goal is always to provide you with excellent care. Hearing back from our patients is one way we can continue to improve our services. Please take a few minutes to complete the written survey that you may receive in the mail after you visit with us. Thank you!        [employer] Information     [employer] gives you secure access to your electronic health record. If you see a primary care provider, you can also send messages to your care team and make appointments. If you have questions, please call your primary care clinic.  If you do not have a primary care provider, please call [phone] and they will assist you.        Care EveryWhere ID     This is your Care EveryWhere ID. This could be used by other organizations to access your [facility] medical records  [id number]        Equal Access to Services     ZENON DIAMOND : Hadii zaire duqueo Sogwendolyn, waaxda luqadaha, qaybta kaalmagregory tenorio, mic angelo . So [facility] [phone].    ATENCIÓN: Si habla español, tiene a singh disposición servicios gratuitos de asistencia lingüística. Llame al [phone].    We comply with applicable federal civil rights laws and [place] laws. We do not discriminate on the basis of race, color, national origin, age, disability sex, sexual orientation or gender identity.            After Visit Summary       This is your record. Keep this with you and show to your community pharmacist(s) and doctor(s) at your next visit.

## 2017-07-30 NOTE — ED AVS SNAPSHOT
[facility] Emergency Department    [address] [place] [address]    Phone:  [phone]    Fax:  [phone]                                       Nevin Alvarado   MRN: [id number]    Department:  [facility] Emergency Department   Date of Visit:  7/30/2017           After Visit Summary Signature Page     I have received my discharge instructions, and my questions have been answered. I have discussed any challenges I see with this plan with the nurse or doctor.    ..........................................................................................................................................  Patient/Patient Representative Signature      ..........................................................................................................................................  Patient Representative Print Name and Relationship to Patient    ..................................................               ................................................  Date                                            Time    ..........................................................................................................................................  Reviewed by Signature/Title    ...................................................              ..............................................  Date                                                            Time

## 2017-07-31 ENCOUNTER — APPOINTMENT (OUTPATIENT)
Dept: ULTRASOUND IMAGING | Facility: CLINIC | Age: 27
End: 2017-07-31
Attending: PHYSICIAN ASSISTANT
Payer: MEDICARE

## 2017-07-31 ENCOUNTER — HOSPITAL ENCOUNTER (EMERGENCY)
Facility: CLINIC | Age: 27
Discharge: HOME | End: 2017-07-31
Attending: EMERGENCY MEDICINE | Admitting: EMERGENCY MEDICINE
Payer: MEDICARE

## 2017-07-31 VITALS
HEIGHT: 61 IN | SYSTOLIC BLOOD PRESSURE: 149 MMHG | DIASTOLIC BLOOD PRESSURE: 83 MMHG | WEIGHT: 226 LBS | BODY MASS INDEX: 42.67 KG/M2 | OXYGEN SATURATION: 94 % | TEMPERATURE: 98.1 F | RESPIRATION RATE: 18 BRPM | HEART RATE: 90 BPM

## 2017-07-31 VITALS
HEART RATE: 85 BPM | OXYGEN SATURATION: 98 % | RESPIRATION RATE: 16 BRPM | TEMPERATURE: 98.6 F | SYSTOLIC BLOOD PRESSURE: 123 MMHG | DIASTOLIC BLOOD PRESSURE: 81 MMHG

## 2017-07-31 DIAGNOSIS — R42 VERTIGO: ICD-10-CM

## 2017-07-31 LAB
ALBUMIN SERPL-MCNC: 4.1 G/DL (ref 3.4–5)
ALBUMIN UR-MCNC: NEGATIVE MG/DL
ALP SERPL-CCNC: 71 U/L (ref 40–150)
ALT SERPL W P-5'-P-CCNC: 27 U/L (ref 0–50)
AMPHETAMINES UR QL SCN: ABNORMAL
ANION GAP SERPL CALCULATED.3IONS-SCNC: 10 MMOL/L (ref 3–14)
APPEARANCE UR: CLEAR
AST SERPL W P-5'-P-CCNC: 15 U/L (ref 0–45)
BARBITURATES UR QL: ABNORMAL
BASOPHILS # BLD AUTO: 0 10E9/L (ref 0–0.2)
BASOPHILS NFR BLD AUTO: 0.2 %
BENZODIAZ UR QL: ABNORMAL
BILIRUB SERPL-MCNC: 0.3 MG/DL (ref 0.2–1.3)
BILIRUB UR QL STRIP: NEGATIVE
BUN SERPL-MCNC: 14 MG/DL (ref 7–30)
CALCIUM SERPL-MCNC: 8.8 MG/DL (ref 8.5–10.1)
CANNABINOIDS UR QL SCN: ABNORMAL
CHLORIDE SERPL-SCNC: 108 MMOL/L (ref 94–109)
CO2 SERPL-SCNC: 25 MMOL/L (ref 20–32)
COCAINE UR QL: ABNORMAL
COLOR UR AUTO: COLORLESS
CREAT SERPL-MCNC: 0.55 MG/DL (ref 0.52–1.04)
DIFFERENTIAL METHOD BLD: ABNORMAL
EOSINOPHIL # BLD AUTO: 0.2 10E9/L (ref 0–0.7)
EOSINOPHIL NFR BLD AUTO: 1.8 %
ERYTHROCYTE [DISTWIDTH] IN BLOOD BY AUTOMATED COUNT: 13.7 % (ref 10–15)
GFR SERPL CREATININE-BSD FRML MDRD: NORMAL ML/MIN/1.7M2
GLUCOSE SERPL-MCNC: 92 MG/DL (ref 70–99)
GLUCOSE UR STRIP-MCNC: NEGATIVE MG/DL
HCG SERPL QL: NEGATIVE
HCT VFR BLD AUTO: 35.6 % (ref 35–47)
HGB BLD-MCNC: 11.5 G/DL (ref 11.7–15.7)
HGB UR QL STRIP: NEGATIVE
IMM GRANULOCYTES # BLD: 0 10E9/L (ref 0–0.4)
IMM GRANULOCYTES NFR BLD: 0.1 %
KETONES UR STRIP-MCNC: NEGATIVE MG/DL
LEUKOCYTE ESTERASE UR QL STRIP: NEGATIVE
LYMPHOCYTES # BLD AUTO: 1.9 10E9/L (ref 0.8–5.3)
LYMPHOCYTES NFR BLD AUTO: 22.6 %
MCH RBC QN AUTO: 29 PG (ref 26.5–33)
MCHC RBC AUTO-ENTMCNC: 32.3 G/DL (ref 31.5–36.5)
MCV RBC AUTO: 90 FL (ref 78–100)
MICRO REPORT STATUS: ABNORMAL
MONOCYTES # BLD AUTO: 0.6 10E9/L (ref 0–1.3)
MONOCYTES NFR BLD AUTO: 6.6 %
NEUTROPHILS # BLD AUTO: 5.7 10E9/L (ref 1.6–8.3)
NEUTROPHILS NFR BLD AUTO: 68.7 %
NITRATE UR QL: NEGATIVE
OPIATES UR QL SCN: ABNORMAL
PCP UR QL SCN: ABNORMAL
PH UR STRIP: 7 PH (ref 5–7)
PLATELET # BLD AUTO: 285 10E9/L (ref 150–450)
POTASSIUM SERPL-SCNC: 3.6 MMOL/L (ref 3.4–5.3)
PROT SERPL-MCNC: 7 G/DL (ref 6.8–8.8)
RBC # BLD AUTO: 3.97 10E12/L (ref 3.8–5.2)
SODIUM SERPL-SCNC: 143 MMOL/L (ref 133–144)
SP GR UR STRIP: 1 (ref 1–1.03)
SPECIMEN SOURCE: ABNORMAL
URN SPEC COLLECT METH UR: NORMAL
UROBILINOGEN UR STRIP-MCNC: 0 MG/DL (ref 0–2)
WBC # BLD AUTO: 8.3 10E9/L (ref 4–11)
WET PREP SPEC: ABNORMAL

## 2017-07-31 PROCEDURE — 96376 TX/PRO/DX INJ SAME DRUG ADON: CPT | Performed by: PHYSICIAN ASSISTANT

## 2017-07-31 PROCEDURE — 87210 SMEAR WET MOUNT SALINE/INK: CPT | Performed by: PHYSICIAN ASSISTANT

## 2017-07-31 PROCEDURE — A9270 NON-COVERED ITEM OR SERVICE: HCPCS | Mod: GY | Performed by: EMERGENCY MEDICINE

## 2017-07-31 PROCEDURE — 85025 COMPLETE CBC W/AUTO DIFF WBC: CPT | Performed by: PHYSICIAN ASSISTANT

## 2017-07-31 PROCEDURE — 96375 TX/PRO/DX INJ NEW DRUG ADDON: CPT | Performed by: PHYSICIAN ASSISTANT

## 2017-07-31 PROCEDURE — 87491 CHLMYD TRACH DNA AMP PROBE: CPT | Performed by: PHYSICIAN ASSISTANT

## 2017-07-31 PROCEDURE — 80307 DRUG TEST PRSMV CHEM ANLYZR: CPT | Performed by: EMERGENCY MEDICINE

## 2017-07-31 PROCEDURE — 87591 N.GONORRHOEAE DNA AMP PROB: CPT | Performed by: PHYSICIAN ASSISTANT

## 2017-07-31 PROCEDURE — 96374 THER/PROPH/DIAG INJ IV PUSH: CPT | Performed by: PHYSICIAN ASSISTANT

## 2017-07-31 PROCEDURE — 90791 PSYCH DIAGNOSTIC EVALUATION: CPT

## 2017-07-31 PROCEDURE — 80053 COMPREHEN METABOLIC PANEL: CPT | Performed by: PHYSICIAN ASSISTANT

## 2017-07-31 PROCEDURE — 25000131 ZZH RX MED GY IP 250 OP 636 PS 637: Mod: GY | Performed by: EMERGENCY MEDICINE

## 2017-07-31 PROCEDURE — 93005 ELECTROCARDIOGRAM TRACING: CPT

## 2017-07-31 PROCEDURE — 99285 EMERGENCY DEPT VISIT HI MDM: CPT | Mod: 25

## 2017-07-31 PROCEDURE — 93976 VASCULAR STUDY: CPT

## 2017-07-31 PROCEDURE — 99284 EMERGENCY DEPT VISIT MOD MDM: CPT

## 2017-07-31 PROCEDURE — 25000128 H RX IP 250 OP 636: Performed by: PHYSICIAN ASSISTANT

## 2017-07-31 RX ORDER — MECLIZINE HYDROCHLORIDE 25 MG/1
25 TABLET ORAL ONCE
Status: COMPLETED | OUTPATIENT
Start: 2017-07-31 | End: 2017-07-31

## 2017-07-31 RX ORDER — KETOROLAC TROMETHAMINE 30 MG/ML
30 INJECTION, SOLUTION INTRAMUSCULAR; INTRAVENOUS ONCE
Status: COMPLETED | OUTPATIENT
Start: 2017-07-31 | End: 2017-07-31

## 2017-07-31 RX ORDER — SODIUM CHLORIDE 9 MG/ML
1000 INJECTION, SOLUTION INTRAVENOUS CONTINUOUS
Status: DISCONTINUED | OUTPATIENT
Start: 2017-07-31 | End: 2017-07-31 | Stop reason: HOSPADM

## 2017-07-31 RX ORDER — LIDOCAINE 40 MG/G
CREAM TOPICAL
Status: DISCONTINUED | OUTPATIENT
Start: 2017-07-31 | End: 2017-07-31 | Stop reason: HOSPADM

## 2017-07-31 RX ORDER — OXYCODONE HYDROCHLORIDE 5 MG/1
5 TABLET ORAL EVERY 6 HOURS PRN
Qty: 5 TABLET | Refills: 0 | Status: SHIPPED | OUTPATIENT
Start: 2017-07-31 | End: 2017-08-18

## 2017-07-31 RX ORDER — HYDROMORPHONE HYDROCHLORIDE 1 MG/ML
0.5 INJECTION, SOLUTION INTRAMUSCULAR; INTRAVENOUS; SUBCUTANEOUS
Status: DISCONTINUED | OUTPATIENT
Start: 2017-07-31 | End: 2017-07-31 | Stop reason: HOSPADM

## 2017-07-31 RX ORDER — MECLIZINE HCL 12.5 MG 12.5 MG/1
25 TABLET ORAL 4 TIMES DAILY PRN
Qty: 30 TABLET | Refills: 0 | Status: SHIPPED | OUTPATIENT
Start: 2017-07-31 | End: 2017-08-21

## 2017-07-31 RX ORDER — FLUCONAZOLE 150 MG/1
TABLET ORAL
Qty: 2 TABLET | Refills: 0 | Status: SHIPPED | OUTPATIENT
Start: 2017-07-31 | End: 2017-08-03

## 2017-07-31 RX ORDER — ONDANSETRON 2 MG/ML
4 INJECTION INTRAMUSCULAR; INTRAVENOUS EVERY 30 MIN PRN
Status: DISCONTINUED | OUTPATIENT
Start: 2017-07-31 | End: 2017-07-31 | Stop reason: HOSPADM

## 2017-07-31 RX ORDER — ONDANSETRON 4 MG/1
4 TABLET, FILM COATED ORAL EVERY 8 HOURS PRN
Qty: 10 TABLET | Refills: 0 | Status: SHIPPED | OUTPATIENT
Start: 2017-07-31 | End: 2017-08-21

## 2017-07-31 RX ADMIN — ONDANSETRON 4 MG: 2 INJECTION INTRAMUSCULAR; INTRAVENOUS at 01:00

## 2017-07-31 RX ADMIN — SODIUM CHLORIDE 1000 ML: 9 INJECTION, SOLUTION INTRAVENOUS at 01:56

## 2017-07-31 RX ADMIN — MECLIZINE HYDROCHLORIDE 25 MG: 25 TABLET ORAL at 18:13

## 2017-07-31 RX ADMIN — HYDROMORPHONE HYDROCHLORIDE 0.5 MG: 1 INJECTION, SOLUTION INTRAMUSCULAR; INTRAVENOUS; SUBCUTANEOUS at 01:57

## 2017-07-31 RX ADMIN — KETOROLAC TROMETHAMINE 30 MG: 30 INJECTION, SOLUTION INTRAMUSCULAR at 01:01

## 2017-07-31 RX ADMIN — HYDROMORPHONE HYDROCHLORIDE 0.5 MG: 1 INJECTION, SOLUTION INTRAMUSCULAR; INTRAVENOUS; SUBCUTANEOUS at 01:03

## 2017-07-31 RX ADMIN — SODIUM CHLORIDE 1000 ML: 9 INJECTION, SOLUTION INTRAVENOUS at 00:59

## 2017-07-31 NOTE — ED PROVIDER NOTES
History     Chief Complaint   Patient presents with     Abdominal Pain     HPI  Nevin Alvarado is a 26 year old female with a history of chronic recurrent abdominal pain and left ovarian cyst who presents for evaluation of acute onset right lower quadrant abdominal pain starting about 45 minutes prior to my evaluation. Abdominal pain started when she was having sexual intercourse. She states that there was a vibrator inserted deep into her vagina when the pain started. She reports the pain being 8-9 on a scale of 10. She denies any dysuria, frequency, urgency, hematuria, diarrhea, or constipation. Last bowel movement was today and it was normal for her. Denies any fevers or chills. No nausea or vomiting. She has never had this type of pain before.  She denies any abnormal vaginal discharge. LMP was one month prior. She does have an IUD in place. Denies any vaginal area rashes.  She denies a past history of sexually transmitted disease.      I have reviewed the Medications, Allergies, Past Medical and Surgical History, and Social History in the Epic system.    Allergies:   Allergies   Allergen Reactions     Augmentin Swelling     Blood-Group Specific Substance Other (See Comments)     Patient has an anti-Cw and non-specific antibodies. Blood product orders may be delayed. Draw one red top and two purple top tubes for all type/screen/crossmatch orders.     Hydrocodone Nausea and Vomiting     vomiting for days     Influenza Vaccines Other (See Comments)     Oseltamivir Hives     med stopped, PN: med stopped     Vicodin [Hydrocodone-Acetaminophen] Nausea and Vomiting     Cefazolin Rash     Cephalosporins Rash         No current facility-administered medications on file prior to encounter.   Current Outpatient Prescriptions on File Prior to Encounter:  ondansetron (ZOFRAN ODT) 4 MG ODT tab Take 1 tablet (4 mg) by mouth every 8 hours as needed for nausea   promethazine (PHENERGAN) 25 MG tablet Take 1 tablet (25  mg) by mouth every 6 hours as needed for nausea   ibuprofen (ADVIL/MOTRIN) 200 MG tablet Take 3 tablets (600 mg) by mouth every 8 hours as needed for mild pain   OLANZapine (ZYPREXA) 5 MG tablet Take 1 tablet (5 mg) orally at bedtime prn   docosanol (ABREVA) 10 % CREA cream Apply topically 5 times daily As needed   topiramate (TOPAMAX) 50 MG tablet Take 25 mg by mouth At Bedtime    lamoTRIgine (LAMICTAL) 100 MG tablet Take 150 mg by mouth At Bedtime    acetaminophen (TYLENOL) 500 MG tablet Take 1-2 tablets (500-1,000 mg) by mouth every 8 hours as needed for mild pain   levonorgestrel (MIRENA) 20 MCG/24HR IUD 1 each (20 mcg) by Intrauterine route once for 1 dose   SUMATRIPTAN SUCCINATE PO Take 50 mg by mouth once as needed for migraine Reported on 5/19/2017   Desvenlafaxine Succinate (PRISTIQ PO) Take 100 mg by mouth every morning    Cholecalciferol (VITAMIN D3 PO) Take 5,000 Units by mouth every morning        Patient Active Problem List   Diagnosis     Migraine without aura     Depression     Borderline personality disorder     Anxiety     H/O self-harm     ADD (attention deficit disorder)     PTSD (post-traumatic stress disorder)     Morbid obesity (H)     Rectal foreign body     Suicidal intent     Suicidal ideation     Syncope       Past Surgical History:   Procedure Laterality Date     ESOPHAGOSCOPY, GASTROSCOPY, DUODENOSCOPY (EGD), COMBINED N/A 3/9/2017    Procedure: COMBINED ESOPHAGOSCOPY, GASTROSCOPY, DUODENOSCOPY (EGD), REMOVE FOREIGN BODY;  Surgeon: Avis Guzmán MD;  Location: [facility] OR     ESOPHAGOSCOPY, GASTROSCOPY, DUODENOSCOPY (EGD), COMBINED N/A 4/20/2017    Procedure: COMBINED ESOPHAGOSCOPY, GASTROSCOPY, DUODENOSCOPY (EGD), REMOVE FOREIGN BODY;  EGD removal Foregin body;  Surgeon: Lokesh Paula MD;  Location: [facility] OR     ESOPHAGOSCOPY, GASTROSCOPY, DUODENOSCOPY (EGD), COMBINED N/A 6/12/2017    Procedure: COMBINED ESOPHAGOSCOPY, GASTROSCOPY, DUODENOSCOPY (EGD);  COMBINED ESOPHAGOSCOPY,  "GASTROSCOPY, DUODENOSCOPY (EGD) [[id number]]attempted removal of foreign body;  Surgeon: Pamela Perez MD;  Location: [facility]     ESOPHAGOSCOPY, GASTROSCOPY, DUODENOSCOPY (EGD), COMBINED N/A 6/9/2017    Procedure: COMBINED ESOPHAGOSCOPY, GASTROSCOPY, DUODENOSCOPY (EGD), REMOVE FOREIGN BODY;  Esophagoscopy, Gastroscopy, Duodenoscopy, Removal of Foreign Body;  Surgeon: Dejon Marsh MD;  Location: [facility]     EXAM UNDER ANESTHESIA ANUS N/A 1/10/2017    Procedure: EXAM UNDER ANESTHESIA ANUS;  Surgeon: Annmarie Haynes MD;  Location: [facility]     HC REMOVE FECAL IMPACTION OR FB W ANESTHESIA N/A 12/18/2016    Procedure: REMOVE FECAL IMPACTION/FOREIGN BODY UNDER ANESTHESIA;  Surgeon: Soham Cano MD;  Location: [facility]     KNEE SURGERY Right     removed a small tissue mass from knee     LAPAROSCOPIC ABLATION ENDOMETRIOSIS       LAPAROTOMY EXPLORATORY N/A 1/10/2017    Procedure: LAPAROTOMY EXPLORATORY;  Surgeon: Annmarie Haynes MD;  Location: [facility] OR     lymph nodes removed from neck; benign  age 7     MAMMOPLASTY REDUCTION Bilateral      RELEASE CARPAL TUNNEL Bilateral      SIGMOIDOSCOPY FLEXIBLE N/A 1/10/2017    Procedure: SIGMOIDOSCOPY FLEXIBLE;  Surgeon: Annmarie Haynes MD;  Location: [facility] OR       Social History   Substance Use Topics     Smoking status: Never Smoker     Smokeless tobacco: Never Used     Alcohol use No       Most Recent Immunizations   Administered Date(s) Administered     HPVQuadrivalent 12/03/2007     TDAP Vaccine (Adacel) 04/16/2015       BMI: Estimated body mass index is 41.57 kg/(m^2) as calculated from the following:    Height as of 7/28/17: 1.549 m (5' 1\").    Weight as of 7/28/17: 99.8 kg (220 lb).      Review of Systems   All other systems reviewed and are negative.      Physical Exam   BP: 146/89  Pulse: 85  Temp: 98.6  F (37  C)  Resp: 20  SpO2: 98 %  Physical Exam  The patient is curled up in a ball, crying, and moaning in the exam room during my " evaluation.  Skin:  No rashes or lesions are noted on inspection of the torso, face, and upper extremities.   Heart:  RRR with normal S1 and S2.  No S3 or S4.  No murmur, rub, gallop, or click.  PMI is nondisplaced.   Lungs:  CTA bilaterally without wheezes, rales, or rhonchi.  Good breath sounds heard throughout all lung fields.  Tympanitic to percussion with no areas of dullness.   ABDOMEN: negative findings: umbilicus normal, symmetric, no masses palpable, no organomegaly, bowel sounds normal, no bruits heard, liver span normal to percussion, positive findings: tenderness marked and rebound,guarding absent RLQ and periumbilical   Vagina and vulva are inflamed and erythematous, possibly related to the recent sexual intercourse.  No sign of active infection.  White discharge is noted.  Cervix normal without lesions.  IUD string is visualized. Uterus anteverted and mobile, normal in size and shape without tenderness.  Adnexa on the right with significant tenderness and what appears to be a golf ball sized mass.  Left adnexa was normal.  Exam chaperoned by her RN, Clarita.        ED Course     ED Course     Procedures             Critical Care time:  none              Results for orders placed or performed during the hospital encounter of 07/30/17   US Pelvic Complete w Transvaginal & Abd/Pel Duplex Limited    Narrative    US PELVIS COMPLETE W TRANSVAGINAL AND DOPPLER LIMITED    7/31/2017  1:34 AM     HISTORY: Pelvic pain.    TECHNIQUE:  Transvaginal images were performed to better evaluate the  patient's uterus, ovaries and endometrial stripe. Spectral Doppler and  waveform analysis was also performed.    COMPARISON: 7/20/2017.    FINDINGS:  The uterus is measured at 7.6 x 4.5 x 4.6 cm. No fibroids  are evident. IUD appears to be in good position within the  endometrium. Endometrial stripe is not well seen due to the presence  of the IUD, but is measured at 0.6 cm where visualized. The right  ovary is unremarkable.  The left ovary contains a small simple cyst  measuring 2.1 cm, possibly a dominant follicle. No solid adnexal  masses are identified. No free pelvic fluid is present. Color Doppler  blood flow is noted within the ovaries bilaterally. The exam was  somewhat limited related to patient body habitus.      Impression    IMPRESSION:   1. IUD appears to be in good position within the endometrium.  2. A 2.1 cm simple cyst in the left ovary may represent a dominant  follicle.  3. The exam was somewhat limited related to patient body habitus.   CBC with platelets differential   Result Value Ref Range    WBC 8.3 4.0 - 11.0 10e9/L    RBC Count 3.97 3.8 - 5.2 10e12/L    Hemoglobin 11.5 (L) 11.7 - 15.7 g/dL    Hematocrit 35.6 35.0 - 47.0 %    MCV 90 78 - 100 fl    MCH 29.0 26.5 - 33.0 pg    MCHC 32.3 31.5 - 36.5 g/dL    RDW 13.7 10.0 - 15.0 %    Platelet Count 285 150 - 450 10e9/L    Diff Method Automated Method     % Neutrophils 68.7 %    % Lymphocytes 22.6 %    % Monocytes 6.6 %    % Eosinophils 1.8 %    % Basophils 0.2 %    % Immature Granulocytes 0.1 %    Absolute Neutrophil 5.7 1.6 - 8.3 10e9/L    Absolute Lymphocytes 1.9 0.8 - 5.3 10e9/L    Absolute Monocytes 0.6 0.0 - 1.3 10e9/L    Absolute Eosinophils 0.2 0.0 - 0.7 10e9/L    Absolute Basophils 0.0 0.0 - 0.2 10e9/L    Abs Immature Granulocytes 0.0 0 - 0.4 10e9/L   Comprehensive metabolic panel   Result Value Ref Range    Sodium 143 133 - 144 mmol/L    Potassium 3.6 3.4 - 5.3 mmol/L    Chloride 108 94 - 109 mmol/L    Carbon Dioxide 25 20 - 32 mmol/L    Anion Gap 10 3 - 14 mmol/L    Glucose 92 70 - 99 mg/dL    Urea Nitrogen 14 7 - 30 mg/dL    Creatinine 0.55 0.52 - 1.04 mg/dL    GFR Estimate >90  Non  GFR Calc   >60 mL/min/1.7m2    GFR Estimate If Black >90   GFR Calc   >60 mL/min/1.7m2    Calcium 8.8 8.5 - 10.1 mg/dL    Bilirubin Total 0.3 0.2 - 1.3 mg/dL    Albumin 4.1 3.4 - 5.0 g/dL    Protein Total 7.0 6.8 - 8.8 g/dL    Alkaline  Phosphatase 71 40 - 150 U/L    ALT 27 0 - 50 U/L    AST 15 0 - 45 U/L   HCG qualitative   Result Value Ref Range    HCG Qualitative Serum Negative NEG   UA reflex to Microscopic and Culture   Result Value Ref Range    Color Urine Colorless     Appearance Urine Clear     Glucose Urine Negative NEG mg/dL    Bilirubin Urine Negative NEG    Ketones Urine Negative NEG mg/dL    Specific Gravity Urine 1.003 1.003 - 1.035    Blood Urine Negative NEG    pH Urine 7.0 5.0 - 7.0 pH    Protein Albumin Urine Negative NEG mg/dL    Urobilinogen mg/dL 0.0 0.0 - 2.0 mg/dL    Nitrite Urine Negative NEG    Leukocyte Esterase Urine Negative NEG    Source Midstream Urine    Wet prep   Result Value Ref Range    Specimen Description Vagina     Wet Prep (A)      No clue cells seen  No Trichomonas seen  No PMNs seen  Moderate Pseudohyphae  Few Budding yeast      Micro Report Status FINAL 07/31/2017           Labs Ordered and Resulted from Time of ED Arrival Up to the Time of Departure from the ED   CBC WITH PLATELETS DIFFERENTIAL - Abnormal; Notable for the following:        Result Value    Hemoglobin 11.5 (*)     All other components within normal limits   WET PREP - Abnormal; Notable for the following:     Wet Prep   (*)     Value: No clue cells seen  No Trichomonas seen  No PMNs seen  Moderate Pseudohyphae  Few Budding yeast      All other components within normal limits   COMPREHENSIVE METABOLIC PANEL   HCG QUALITATIVE   UA MACROSCOPIC WITH REFLEX TO MICRO AND CULTURE   PERIPHERAL IV CATHETER   CHLAMYDIA TRACHOMATIS PCR   NEISSERIA GONORRHOEAE PCR       Assessments & Plan (with Medical Decision Making)     RLQ abdominal pain  Candidiasis of vulva and vagina     26 year old female presents for evaluation of acute onset right adnexal discomfort while having intercourse and using a defibrillator with deep penetration. She was also using essential oils on the fiber in her per her report. She has not had problems like this before. She came in  to the ED immediately after pain started for evaluation given the severity of the pain. She denies any previous vaginal discharge. No fevers, chills, nausea, vomiting, diarrhea, or dysuria. IUD is in place. On exam her vital signs are stable and she is afebrile. She is in significant pain. Tenderness in the right adnexal region without rebound or guarding. Pelvic exam performed and she has reproducible right adnexal pain. White vaginal discharge noted. IUD strings are present and appear normal. Pelvic ultrasound performed showing the persistent left ovarian cyst, but no other acute abnormalities. Laboratory evaluation with normal urinalysis, negative hCG, normal white blood count at 8300, and normal comprehensive metabolic panel. Given her normal vital signs and normal white blood cell count, this does not appear to represent an infectious etiology. Therefore, further imaging is not indicated. IV Dilaudid was given for pain management with good success. She has had for visits in the ED over the last 1 month for chronic abdominal pain. This appears to be irritation due to her sexual injury. I recommended against using a vibrator again. We will treat her yeast vaginitis with Diflucan. I recommended that she use ibuprofen and Tylenol to treat her pain. I did give her #5 tabs of oxycodone 5 mg to use every 6 hours as needed for breakthrough pain. Follow up if symptoms worsen. Recommended PCP visit in the next 1-2 days. She was in agreement with this plan and was suitable for discharge.      I have reviewed the nursing notes.    I have reviewed the findings, diagnosis, plan and need for follow up with the patient.       New Prescriptions    FLUCONAZOLE (DIFLUCAN) 150 MG TABLET    Take one tablet now, and one tablet in three days    OXYCODONE (ROXICODONE) 5 MG IR TABLET    Take 1 tablet (5 mg) by mouth every 6 hours as needed for pain       Final diagnoses:   RLQ abdominal pain   Candidiasis of vulva and vagina        Disclaimer: This note consists of symbols derived from keyboarding, dictation and/or voice recognition software. As a result, there may be errors in the script that have gone undetected. Please consider this when interpreting information found in this chart.      7/30/2017   Jeffrey Burns PA-C   [facility] EMERGENCY DEPARTMENT     Jeffrey Burns PA-C  07/31/17 0210

## 2017-07-31 NOTE — ED NOTES
"C/O dizziness while shopping today. Denies cardiac hx, SOB, CP. Also c/o chronic back pain. Pt seen at ED for similar symptoms 3 days ago, negative workup. Pt also reports abdominal discomfort after, \"Fooling around with a friend using a vibrator\" yesterday. Pt seen at [place] ED yesterday. ABC in tact. A/OX3. Per EMS pt requesting to go to [facility] and reports she doesn't want to go to [facility].  "

## 2017-07-31 NOTE — DISCHARGE INSTRUCTIONS
1.  Please do not use the vibrator again as this caused you a lot of pain.  Thankfully, nothing serious was wrong.  2.  Please take the Diflucan for the yeast infection.    3.  It is okay to use the Oxycodone as needed for breakthrough pain if Ibuprofen 800 mg every 8 hours does not treat your pain.  Try taking 1,000 mg of Tylenol every 6 hours as well.

## 2017-07-31 NOTE — ED AVS SNAPSHOT
[facility] Emergency Department    [address]    [facility] [address]    Phone:  [phone]    Fax:  [phone]                                       Nevin Alvarado   MRN: [id number]    Department:  [facility] Emergency Department   Date of Visit:  7/31/2017           After Visit Summary Signature Page     I have received my discharge instructions, and my questions have been answered. I have discussed any challenges I see with this plan with the nurse or doctor.    ..........................................................................................................................................  Patient/Patient Representative Signature      ..........................................................................................................................................  Patient Representative Print Name and Relationship to Patient    ..................................................               ................................................  Date                                            Time    ..........................................................................................................................................  Reviewed by Signature/Title    ...................................................              ..............................................  Date                                                            Time

## 2017-07-31 NOTE — ED PROVIDER NOTES
"  History     Chief Complaint:  Dizziness    HPI   Nevin Alvarado is a 26 year old female who presents to the emergency department today for evaluation of dizziness. She reports she was shopping with friends, when she felt dizzy and sat down before losing consciousness. EMS was called when she passed out and they brought her to the ED for further evaluation. She reports felling a spinning sensation and being tired. Additionally, she has some abdominal discomfort after \"fooling around with a friend using a vibrator\" yesterday. Per chart review, she was seen in the ED three days ago and again yesterday in [place] for similar symptoms. Most recent results as per below. The patient has no other concerns at this time.       Results for orders placed or performed during the hospital encounter of 07/30/17   US Pelvic Complete w Transvaginal & Abd/Pel Duplex Limited     Narrative     US PELVIS COMPLETE W TRANSVAGINAL AND DOPPLER LIMITED    7/31/2017  1:34 AM      HISTORY: Pelvic pain.     TECHNIQUE:  Transvaginal images were performed to better evaluate the  patient's uterus, ovaries and endometrial stripe. Spectral Doppler and  waveform analysis was also performed.     COMPARISON: 7/20/2017.     FINDINGS:  The uterus is measured at 7.6 x 4.5 x 4.6 cm. No fibroids  are evident. IUD appears to be in good position within the  endometrium. Endometrial stripe is not well seen due to the presence  of the IUD, but is measured at 0.6 cm where visualized. The right  ovary is unremarkable. The left ovary contains a small simple cyst  measuring 2.1 cm, possibly a dominant follicle. No solid adnexal  masses are identified. No free pelvic fluid is present. Color Doppler  blood flow is noted within the ovaries bilaterally. The exam was  somewhat limited related to patient body habitus.     Impression     IMPRESSION:   1. IUD appears to be in good position within the endometrium.  2. A 2.1 cm simple cyst in the left ovary may " represent a dominant  follicle.  3. The exam was somewhat limited related to patient body habitus.   CBC with platelets differential   Result Value Ref Range     WBC 8.3 4.0 - 11.0 10e9/L     RBC Count 3.97 3.8 - 5.2 10e12/L     Hemoglobin 11.5 (L) 11.7 - 15.7 g/dL     Hematocrit 35.6 35.0 - 47.0 %     MCV 90 78 - 100 fl     MCH 29.0 26.5 - 33.0 pg     MCHC 32.3 31.5 - 36.5 g/dL     RDW 13.7 10.0 - 15.0 %     Platelet Count 285 150 - 450 10e9/L     Diff Method Automated Method       % Neutrophils 68.7 %     % Lymphocytes 22.6 %     % Monocytes 6.6 %     % Eosinophils 1.8 %     % Basophils 0.2 %     % Immature Granulocytes 0.1 %     Absolute Neutrophil 5.7 1.6 - 8.3 10e9/L     Absolute Lymphocytes 1.9 0.8 - 5.3 10e9/L     Absolute Monocytes 0.6 0.0 - 1.3 10e9/L     Absolute Eosinophils 0.2 0.0 - 0.7 10e9/L     Absolute Basophils 0.0 0.0 - 0.2 10e9/L     Abs Immature Granulocytes 0.0 0 - 0.4 10e9/L   Comprehensive metabolic panel   Result Value Ref Range     Sodium 143 133 - 144 mmol/L     Potassium 3.6 3.4 - 5.3 mmol/L     Chloride 108 94 - 109 mmol/L     Carbon Dioxide 25 20 - 32 mmol/L     Anion Gap 10 3 - 14 mmol/L     Glucose 92 70 - 99 mg/dL     Urea Nitrogen 14 7 - 30 mg/dL     Creatinine 0.55 0.52 - 1.04 mg/dL     GFR Estimate >90  Non  GFR Calc >60 mL/min/1.7m2     GFR Estimate If Black >90   GFR Calc >60 mL/min/1.7m2     Calcium 8.8 8.5 - 10.1 mg/dL     Bilirubin Total 0.3 0.2 - 1.3 mg/dL     Albumin 4.1 3.4 - 5.0 g/dL     Protein Total 7.0 6.8 - 8.8 g/dL     Alkaline Phosphatase 71 40 - 150 U/L     ALT 27 0 - 50 U/L     AST 15 0 - 45 U/L   HCG qualitative   Result Value Ref Range     HCG Qualitative Serum Negative NEG   UA reflex to Microscopic and Culture   Result Value Ref Range     Color Urine Colorless       Appearance Urine Clear       Glucose Urine Negative NEG mg/dL     Bilirubin Urine Negative NEG     Ketones Urine Negative NEG mg/dL     Specific Gravity Urine 1.003  1.003 - 1.035     Blood Urine Negative NEG     pH Urine 7.0 5.0 - 7.0 pH     Protein Albumin Urine Negative NEG mg/dL     Urobilinogen mg/dL 0.0 0.0 - 2.0 mg/dL     Nitrite Urine Negative NEG     Leukocyte Esterase Urine Negative NEG     Source Midstream Urine     Wet prep   Result Value Ref Range     Specimen Description Vagina       Wet Prep (A)         No clue cells seen  No Trichomonas seen  No PMNs seen  Moderate Pseudohyphae  Few Budding yeast     Micro Report Status FINAL 07/31/2017                     Labs Ordered and Resulted from Time of ED Arrival Up to the Time of Departure from the ED   CBC WITH PLATELETS DIFFERENTIAL - Abnormal; Notable for the following:        Result Value      Hemoglobin 11.5 (*)       All other components within normal limits   WET PREP - Abnormal; Notable for the following:      Wet Prep   (*)       Value: No clue cells seen  No Trichomonas seen  No PMNs seen  Moderate Pseudohyphae  Few Budding yeast      All other components within normal limits   COMPREHENSIVE METABOLIC PANEL   HCG QUALITATIVE   UA MACROSCOPIC WITH REFLEX TO MICRO AND CULTURE   PERIPHERAL IV CATHETER   CHLAMYDIA TRACHOMATIS PCR   NEISSERIA GONORRHOEAE PCR     7/30/2017   Jeffrey Burns PA-C   [facility] EMERGENCY DEPARTMENT    Allergies:  Augmentin   Hydrocodone   Influenza Vaccines   Oseltamivir   Vicodin   Cefazolin   Cephalosporins     Medications:    oxyCODONE   fluconazole   ondansetron   OLANZapine   docosanol   topiramate   lamoTRIgine   SUMATRIPTAN SUCCINATE PO  Desvenlafaxine Succinate (PRISTIQ PO)  levonorgestrel     Past Medical History:    ADD    Anorexia nervosa with bulimia   Anxiety   Borderline personality disorder   Depression   H/O self-harm   Lives in independent group home   Migraine without aura   Morbid obesity    PTSD    Rectal foreign body    Past Surgical History:    ESOPHAGOSCOPY, GASTROSCOPY, DUODENOSCOPY (EGD), COMBINED N/A x5    EXAM UNDER ANESTHESIA ANUS N/A   HC REMOVE FECAL  "IMPACTION OR FB W ANESTHESIA   KNEE SURGERY Right   LAPAROSCOPIC ABLATION ENDOMETRIOSIS    LAPAROTOMY EXPLORATORY    lymph nodes removed from neck; benign    MAMMOPLASTY REDUCTION Bilateral   RELEASE CARPAL TUNNEL Bilateral   SIGMOIDOSCOPY FLEXIBLE    Family History:    Type II Diabetes  Myocardial Infarction  Cancer  Coronary Artery Disease    Social History:  The patient alone   Smoking Status: Never  Smokeless Tobacco: Never  Alcohol Use: No  Marital Status:  Single      Review of Systems   Constitutional: Positive for fatigue. Negative for fever.   Gastrointestinal: Positive for nausea.   Neurological: Positive for dizziness and syncope.   Psychiatric/Behavioral: Positive for behavioral problems. The patient is nervous/anxious.    All other systems reviewed and are negative.     Physical Exam   First Vitals:  BP: (!) 148/93  Pulse: 90  Heart Rate: 90  Temp: 98.1  F (36.7  C)  Resp: 18  Height: 154.9 cm (5' 1\")  Weight: 102.5 kg (226 lb)  SpO2: 95 %      Physical Exam  General: lying on exam cart with eyes closed; refuses to open eyes to command, but not asleep or unconscious.  HENT:  Atraumatic.  Moist oral mucosa.  Eyes: EOMI, PERRLA.  Normal conjunctiva.  Neck: Normal range of motion. Neck supple.  Cardiovascular: Normal rate, regular rhythm and normal heart sounds.   Pulmonary/Chest: Effort normal.  No wheezing or crackles.  Abdominal: Soft. Patient exhibits no distension and no mass. There is no tenderness.       Musculoskeletal: Normal range of motion. Patient exhibits no edema and no tenderness.   Neurological: oriented, sober.  States she is tired, requires physical stimulus to to interview and exam.  No focal weakness.   Skin: Skin is warm and dry. No rash noted.   Psychiatric: no hallucinations.       Emergency Department Course     ECG:  Indication: LOC  Completed at 1701.  Read at 1705.   Normal simus rhythm  Normal ECG  Rate 76 bpm. NM interval 152. QRS duration 84. QT/QTc 368/414. P-R-T axes 53 64 " 17.    Laboratory:  Laboratory findings were communicated with the patient who voiced understanding of the findings.  Drug abuse screen 77 urine: Opiates positive    Interventions:  1813 Meclizine 25 mg PO     Emergency Department Course:  Nursing notes and vitals reviewed.  I performed an exam of the patient as documented above.   The patient provided a urine sample here in the emergency department. This was sent for laboratory testing, findings above.  2013: Patient rechecked and updated.   2148: DEC evaluated the patient and does not believe she needs emergent psych interventions at this time.   I discussed the treatment plan with the patient. They expressed understanding of this plan and consented to discharge. They will be discharged home with instructions for care and follow up. In addition, the patient will return to the emergency department if their symptoms persist, worsen, if new symptoms arise or if there is any concern.  All questions were answered  I personally reviewed the laboratory results with the Patient and answered all related questions prior to discharge.    Impression & Plan      Medical Decision Making:  Nevin Alvarado is a 26 year old female who arrives by ambulance for evaluation of dizziness. This occurred while she was shopping at the [employer]. She has a history of borderline personality disorder, anxiety, PTSD, and self harm. She is a difficult historian and was actually difficult to arouse on my initial encounter with her which clearly in my judgement, was volitional. She has an otherwise normal, non focal neurological exam, with no observable nystagmus. She was medicated with meclizine and had a urine drug screen obtained which is positive for opiates. Numerous RN staff are familiar with Nevin and relate a history of attention seeking behavior. She was provided reassurance and DEC consult obtained, during which time, no red flags were raised. I have recommended follow up with her  provider as needed.      Diagnosis:    ICD-10-CM    1. Vertigo R42 Drug abuse screen 77 urine ([place])     Disposition:   Discharged to home     Discharge Medication List as of 7/31/2017  7:52 PM      START taking these medications    Details   meclizine (ANTIVERT) 12.5 MG tablet Take 2 tablets (25 mg) by mouth 4 times daily as needed for dizziness, Disp-30 tablet, R-0, Local Print      ondansetron (ZOFRAN) 4 MG tablet Take 1 tablet (4 mg) by mouth every 8 hours as needed for nausea, Disp-10 tablet, R-0, Local Print             Scribe Disclosure:  I, Elke Espinoza, am serving as a scribe at 6:24 PM on 7/31/2017 to document services personally performed by Reece Armstrong MD, based on my observations and the provider's statements to me.    7/31/2017   [facility] EMERGENCY DEPARTMENT       Reece Armstrong MD  08/01/17 7622

## 2017-07-31 NOTE — ED AVS SNAPSHOT
[facility] Emergency Department    [address] [place] [address]    Phone:  [phone]    Fax:  [phone]                                       Nevin Alvarado   MRN: [id number]    Department:  [facility] Emergency Department   Date of Visit:  7/31/2017           Patient Information     Date Of Birth          1990        Your diagnoses for this visit were:     Vertigo        You were seen by Reece Armstrong MD.      Follow-up Information     Follow up with Sandra Ramos MD.    Specialty:  Internal Medicine    Why:  for re-evaluation of your symptoms this week    Contact information:    [facility]  [address]  [facility] [address]  [phone]          Discharge Instructions         Discharge Instructions  Vertigo  You have been diagnosed with vertigo.  This is a feeling that you are spinning or that the room is moving around you. You will often have nausea, vomiting, and balance problems with it.  Vertigo is usually caused by a problem in the inner ear which helps control your balance.  Many things can cause vertigo, including calcium collections in the inner ear, a virus infection of the inner ear, concussion, migraine, and some medicines.  Luckily, these causes are not life threatening and will eventually go away.  However, sometimes there is a serious problem that does not show up right away.  Return to the Emergency Department if you have:    New or severe headache.    Temperature greater than 100.4 F (38 C).    Seeing double or having trouble seeing clearly.    Trouble speaking or hearing.    Weakness in an arm or leg.    Passing out.    Numbness or tingling.    Chest pain.    Vomiting that will not stop.    Treatment:    An antihistamine, such as Antivert  (meclizine), or non-prescription medicines like Dramamine  (dimenhydrinate), or Benadryl  (diphenhydramine).    Prescription anti-nausea medicines, such as  Phenergan  (promethazine), Reglan  (metoclopramide), or Zofran  (ondansetron).    Prescription sedative medicines, such as Valium  (diazepam), Ativan  (lorazepam), or Klonopin  (clonazepam).    Most of these medicines make you sleepy, and you should not take them before you work or drive. You should only take prescription medicines to treat severe vertigo symptoms, and you should stop the medicine when your symptoms improve.    Follow Up:    If you have vertigo longer than three days, it is important that you follow up either with your primary doctor or an Ear Nose and Throat doctor.  You may need further testing to evaluate your vertigo and you may also need  vestibular  therapy which is a special form of physical therapy to make the vertigo go away.    If you were given a prescription for medicine here today, be sure to read all of the information (including the package insert) that comes with your prescription.  This will include important information about the medicine, its side effects, and any warnings that you need to know about.  The pharmacist who fills the prescription can provide more information and answer questions you may have about the medicine.  If you have questions or concerns that the pharmacist cannot address, please call or return to the Emergency Department.       Future Appointments        Provider Department Dept Phone Center    8/2/2017 1:45 PM Mariama Mcallister MD [facility] [phone] [facility]      24 Hour Appointment Hotline       To make an appointment at any [facility]), call [phone] ([phone]). If you don't have a family doctor or clinic, we will help you find one. [facility] are conveniently located to serve the needs of you and your family.             Review of your medicines      START taking        Dose / Directions Last dose taken    meclizine 12.5 MG tablet   Commonly known as:  ANTIVERT   Dose:  25 mg   Quantity:  30 tablet         Take 2 tablets (25 mg) by mouth 4 times daily as needed for dizziness   Refills:  0        ondansetron 4 MG tablet   Commonly known as:  ZOFRAN   Dose:  4 mg   Quantity:  10 tablet        Take 1 tablet (4 mg) by mouth every 8 hours as needed for nausea   Refills:  0          Our records show that you are taking the medicines listed below. If these are incorrect, please call your family doctor or clinic.        Dose / Directions Last dose taken    acetaminophen 500 MG tablet   Commonly known as:  TYLENOL   Dose:  500-1000 mg   Quantity:  90 tablet        Take 1-2 tablets (500-1,000 mg) by mouth every 8 hours as needed for mild pain   Refills:  3        docosanol 10 % Crea cream   Commonly known as:  ABREVA   Quantity:  2 g        Apply topically 5 times daily As needed   Refills:  3        fluconazole 150 MG tablet   Commonly known as:  DIFLUCAN   Quantity:  2 tablet        Take one tablet now, and one tablet in three days   Refills:  0        ibuprofen 200 MG tablet   Commonly known as:  ADVIL/MOTRIN   Dose:  600 mg   Quantity:  30 tablet        Take 3 tablets (600 mg) by mouth every 8 hours as needed for mild pain   Refills:  0        LAMICTAL 100 MG tablet   Dose:  150 mg   Generic drug:  lamoTRIgine        Take 150 mg by mouth At Bedtime   Refills:  0        levonorgestrel 20 MCG/24HR IUD   Commonly known as:  MIRENA   Dose:  1 each        1 each (20 mcg) by Intrauterine route once for 1 dose   Refills:  0        OLANZapine 5 MG tablet   Commonly known as:  zyPREXA   Quantity:  10 tablet        Take 1 tablet (5 mg) orally at bedtime prn   Refills:  0        ondansetron 4 MG ODT tab   Commonly known as:  ZOFRAN ODT   Dose:  4 mg   Quantity:  10 tablet        Take 1 tablet (4 mg) by mouth every 8 hours as needed for nausea   Refills:  0        oxyCODONE 5 MG IR tablet   Commonly known as:  ROXICODONE   Dose:  5 mg   Quantity:  5 tablet        Take 1 tablet (5 mg) by mouth every 6 hours as needed for pain   Refills:   0        PRISTIQ PO   Dose:  100 mg        Take 100 mg by mouth every morning   Refills:  0        SUMATRIPTAN SUCCINATE PO   Dose:  50 mg        Take 50 mg by mouth once as needed for migraine Reported on 5/19/2017   Refills:  0        TOPAMAX 50 MG tablet   Dose:  25 mg   Generic drug:  topiramate        Take 25 mg by mouth At Bedtime   Refills:  0        VITAMIN D3 PO   Dose:  5000 Units        Take 5,000 Units by mouth every morning   Refills:  0                Prescriptions were sent or printed at these locations (2 Prescriptions)                   Other Prescriptions                Printed at Department/Unit printer (2 of 2)         meclizine (ANTIVERT) 12.5 MG tablet               ondansetron (ZOFRAN) 4 MG tablet                Procedures and tests performed during your visit     Drug abuse screen 77 urine ([place])    EKG 12 lead      Orders Needing Specimen Collection     None      Pending Results     Date and Time Order Name Status Description    7/31/2017 1651 EKG 12 lead Preliminary     7/31/2017 0012 Neisseria gonorrhoea PCR In process     7/31/2017 0012 Chlamydia trachomatis PCR In process             Pending Culture Results     Date and Time Order Name Status Description    7/31/2017 0012 Neisseria gonorrhoea PCR In process     7/31/2017 0012 Chlamydia trachomatis PCR In process             Pending Results Instructions     If you had any lab results that were not finalized at the time of your Discharge, you can call the ED Lab Result RN at [phone]. You will be contacted by this team for any positive Lab results or changes in treatment. The nurses are available 7 days a week from 10A to 6:30P.  You can leave a message 24 hours per day and they will return your call.        Test Results From Your Hospital Stay        7/31/2017  7:51 PM      Component Results     Component Value Ref Range & Units Status    Amphetamine Qual Urine  NEG Final    Negative   Cutoff for a negative amphetamine is 500  ng/mL or less.      Barbiturates Qual Urine  NEG Final    Negative   Cutoff for a negative barbiturate is 200 ng/mL or less.      Benzodiazepine Qual Urine  NEG Final    Negative   Cutoff for a negative benzodiazepine is 200 ng/mL or less.      Cannabinoids Qual Urine  NEG Final    Negative   Cutoff for a negative cannabinoid is 50 ng/mL or less.      Cocaine Qual Urine  NEG Final    Negative   Cutoff for a negative cocaine is 300 ng/mL or less.      Opiates Qualitative Urine  NEG Final    Positive   Cutoff for a positive opiate is greater than 300 ng/mL. This is an unconfirmed   screening result to be used for medical purposes only.   (A)    PCP Qual Urine  NEG Final    Negative   Cutoff for a negative PCP is 25 ng/mL or less.                  Clinical Quality Measure: Blood Pressure Screening     Your blood pressure was checked while you were in the emergency department today. The last reading we obtained was  BP: (!) 158/91 . Please read the guidelines below about what these numbers mean and what you should do about them.  If your systolic blood pressure (the top number) is less than 120 and your diastolic blood pressure (the bottom number) is less than 80, then your blood pressure is normal. There is nothing more that you need to do about it.  If your systolic blood pressure (the top number) is 120-139 or your diastolic blood pressure (the bottom number) is 80-89, your blood pressure may be higher than it should be. You should have your blood pressure rechecked within a year by a primary care provider.  If your systolic blood pressure (the top number) is 140 or greater or your diastolic blood pressure (the bottom number) is 90 or greater, you may have high blood pressure. High blood pressure is treatable, but if left untreated over time it can put you at risk for heart attack, stroke, or kidney failure. You should have your blood pressure rechecked by a primary care provider within the next 4 weeks.  If your  provider in the emergency department today gave you specific instructions to follow-up with your doctor or provider even sooner than that, you should follow that instruction and not wait for up to 4 weeks for your follow-up visit.        Thank you for choosing [facility]       Thank you for choosing [facility] for your care. Our goal is always to provide you with excellent care. Hearing back from our patients is one way we can continue to improve our services. Please take a few minutes to complete the written survey that you may receive in the mail after you visit with us. Thank you!        [facility] Information     [facility] gives you secure access to your electronic health record. If you see a primary care provider, you can also send messages to your care team and make appointments. If you have questions, please call your primary care clinic.  If you do not have a primary care provider, please call [phone] and they will assist you.        Care EveryWhere ID     This is your Care EveryWhere ID. This could be used by other organizations to access your [facility] medical records  [id number]        Equal Access to Services     ZENON DIAMOND : Hadii zaire duqueo Sogwendolyn, waaxda luqadaha, qaybta kaalmagregory tenorio, mic angelo . So [facility] [phone].    ATENCIÓN: Si habla español, tiene a singh disposición servicios gratuitos de asistencia lingüística. Llame al [phone].    We comply with applicable federal civil rights laws and [place] laws. We do not discriminate on the basis of race, color, national origin, age, disability sex, sexual orientation or gender identity.            After Visit Summary       This is your record. Keep this with you and show to your community pharmacist(s) and doctor(s) at your next visit.

## 2017-07-31 NOTE — ED NOTES
Pt sleepy and not arousing on sound but touch, writer inquired how much oxycodone pt has taken today. Pt reports sleeping 1-1/2 hours last night and taking 2 oxycodone prior to coming to ED for abdominal pain.

## 2017-07-31 NOTE — ED NOTES
Bed: ED27  Expected date: 7/31/17  Expected time: 4:31 PM  Means of arrival: Ambulance  Comments:  A511   29yo lightheaded

## 2017-07-31 NOTE — ED NOTES
Patient reports having abdominal pains and vaginal burning after having intercourse this evening with a gentleman. She said she just met him tonight. He used oils and a vibrator he brought with him. Pain has a history of endometriosis.

## 2017-08-01 LAB
C TRACH DNA SPEC QL NAA+PROBE: NORMAL
INTERPRETATION ECG - MUSE: NORMAL
N GONORRHOEA DNA SPEC QL NAA+PROBE: NORMAL
SPECIMEN SOURCE: NORMAL
SPECIMEN SOURCE: NORMAL

## 2017-08-01 ASSESSMENT — ENCOUNTER SYMPTOMS
DIZZINESS: 1
NAUSEA: 1
NERVOUS/ANXIOUS: 1
FATIGUE: 1
FEVER: 0

## 2017-08-01 NOTE — DISCHARGE INSTRUCTIONS
Discharge Instructions  Vertigo  You have been diagnosed with vertigo.  This is a feeling that you are spinning or that the room is moving around you. You will often have nausea, vomiting, and balance problems with it.  Vertigo is usually caused by a problem in the inner ear which helps control your balance.  Many things can cause vertigo, including calcium collections in the inner ear, a virus infection of the inner ear, concussion, migraine, and some medicines.  Luckily, these causes are not life threatening and will eventually go away.  However, sometimes there is a serious problem that does not show up right away.  Return to the Emergency Department if you have:    New or severe headache.    Temperature greater than 100.4 F (38 C).    Seeing double or having trouble seeing clearly.    Trouble speaking or hearing.    Weakness in an arm or leg.    Passing out.    Numbness or tingling.    Chest pain.    Vomiting that will not stop.    Treatment:    An antihistamine, such as Antivert  (meclizine), or non-prescription medicines like Dramamine  (dimenhydrinate), or Benadryl  (diphenhydramine).    Prescription anti-nausea medicines, such as Phenergan  (promethazine), Reglan  (metoclopramide), or Zofran  (ondansetron).    Prescription sedative medicines, such as Valium  (diazepam), Ativan  (lorazepam), or Klonopin  (clonazepam).    Most of these medicines make you sleepy, and you should not take them before you work or drive. You should only take prescription medicines to treat severe vertigo symptoms, and you should stop the medicine when your symptoms improve.    Follow Up:    If you have vertigo longer than three days, it is important that you follow up either with your primary doctor or an Ear Nose and Throat doctor.  You may need further testing to evaluate your vertigo and you may also need  vestibular  therapy which is a special form of physical therapy to make the vertigo go away.    If you were given a  prescription for medicine here today, be sure to read all of the information (including the package insert) that comes with your prescription.  This will include important information about the medicine, its side effects, and any warnings that you need to know about.  The pharmacist who fills the prescription can provide more information and answer questions you may have about the medicine.  If you have questions or concerns that the pharmacist cannot address, please call or return to the Emergency Department.

## 2017-08-01 NOTE — ED NOTES
"Ambulated pt per md order. Pt was \"sleeping\" when writer entered room - had to speak loudly to arouse her.  Pt walked well except for an occasional stop to \"sway\" back and forth.  Pt did not open her eyes while walking.  Stated her legs were shaky and she is \"just SO tired\".  On arrival back to the room, pt laid back in bed with a groan and asked if writer could lay head of bed down because the room was spinning.  Vitals stable. MD notified.  "

## 2017-08-01 NOTE — ED NOTES
"Pt appeared to be sleeping on writer arrival in room. Pt did not answer to verbal or light touch. Sternal rub administered. Pt opened eyes and states, \"What?\" Pt then reports \"Something is wrong, I took too much oxy this morning, that's why I'm so sleepy.\" Pt then ambulated to restroom SBA for urine testing.  "

## 2017-08-02 ENCOUNTER — OFFICE VISIT (OUTPATIENT)
Dept: OBGYN | Facility: CLINIC | Age: 27
End: 2017-08-02
Payer: MEDICARE

## 2017-08-02 VITALS
HEART RATE: 69 BPM | HEIGHT: 61 IN | SYSTOLIC BLOOD PRESSURE: 131 MMHG | DIASTOLIC BLOOD PRESSURE: 82 MMHG | WEIGHT: 230.1 LBS | BODY MASS INDEX: 43.44 KG/M2

## 2017-08-02 DIAGNOSIS — G89.29 CHRONIC PELVIC PAIN IN FEMALE: Primary | ICD-10-CM

## 2017-08-02 DIAGNOSIS — R10.2 CHRONIC PELVIC PAIN IN FEMALE: Primary | ICD-10-CM

## 2017-08-02 PROCEDURE — 99212 OFFICE O/P EST SF 10 MIN: CPT | Mod: ZF

## 2017-08-02 NOTE — DISCHARGE SUMMARY
[facility]  Department of Psychiatry    DATE OF ADMISSION:  6/8/2017    DATE OF DISCHARGE:  6/13/2017    DISCHARGE DIAGNOSES:   1.  Major depressive disorder, recurrent, moderate   2.  Borderline personality disorder.   3.  Generalized anxiety disorder.     HOSPITAL COURSE: (Refer to H&P, progress notes, and consult notes for details)    The patient was admitted to our Behavioral Health Unit under the care of Dr. Arias after swallowing three paperclips as a response to psychosocial stressors. The patient's care was later transferred to me. She mostly focused on frustration with rules associated with the hospital unit and how this was worsening her mood and anxiety symptoms. She frequently requested to utilize art supplies and described this as a coping strategy which was difficult for her to gain access to in the hospital. Because of this, she endorsed symptom exacerbation related to her hospitalization. She described willingness to be able to cope with stressors more adequately outside the hospital environment. She was able to reflect on the work-related stressor which precipitated her ingestion of the three paperclips. Her care was coordinated with the medical services to ensure physical well-being after she had swallowed the paperclips. This involved consultation with the gastroenterology service who recommended routine imaging to ensure safe passage of the foreign bodies. The patient continued to deny suicidal thoughts throughout her hospitalization. She was eating her meals, taking her medications, and willing to follow up with her outpatient providers. Since the patient no longer met criteria to continue inpatient hospitalization, her care was transition back to her outpatient providers.    Mood and anxiety-related symptoms had improved by the time of discharge and the patient denied suicidal and homicidal thoughts, plans, or intent.  Treatment team felt the patient  was stable and ready for discharge.    No restraints or seclusions were required during their hospitalization.  No allergies or severe adverse reactions to the medications were noted.    Other interventions received during his hospitalization included:   Psychosocial treatments were addressed with groups, social work consult, and supportive milieu provided by staff.    CONDITION AT DISCHARGE:  Improved.  The patients acute suicide risk is low due to the following factors:  improved mood/anxiety symptoms.  Denies suicidal ideations. Denies psychotic symptoms.  Not actively intoxicated and plans to abstain from illicit substances and alcohol.  Denies access to guns.  Denies feeling hopeless or helpless. At the time of discharge Nevin Alvarado was determined to not be an immediate danger to herself or others. The patient's acute risk will be higher if noncompliant with treatment plan, medications, follow-up or using illicit substances or alcohol.  These findings along with the risks of noncompliance with medications and treatment plan, which could potentially cause decompensation and increase the risk for suicide, were discussed with the patient.  The patients chronic suicide risk is moderate given the following factors: past suicide attempts; white race; diagnosis of MDD, Borderline Personality disorder; history of SIB; family history of suicide.  Preventative factors include: social supports, stable housing, employment     MENTAL STATUS EXAMINATION AT TIME OF DISCHARGE:  The patient is 26 year old White female who appears their stated age and is appropriately dressed with good hygiene.  Calm and cooperative with the interview questions.  No psychomotor abnormalities are noted. Eye contact is appropriate. Speech has normal rate, tone, latency and volume and is not pushed or pressured. Mood is euthymic and affect is full and appropriate.  The patient does not seem overtly depressed, anxious, manic or irritable.   Thought process is linear, logical and future oriented.  Thought process is not tangential, circumstantial or disorganized.  Thought content is not significant for apperant paranoia, delusions, ideas of reference or grandiosity.  The patient denies suicidal and homicidal ideations as well as auditory and visual hallucinations.  Insight and judgment are fair.  Cognition appears intact to interviewing including orientation, recent and remote memory, fund of knowledge, use of language, attention span and concentration.  Muscle strength, tone and gait appear normal on visual inspection.      DISPOSITION:  The patient is discharged home under a provisional discharge which had not been revoked during this hospitalization     FOLLOWUP APPOINTMENTS:  ( per social workers notes and after visit summary)  1.  The gastroenterology service recommended that the patient obtain an abdominal x-ray every 48 hours until confirmed clearance of the foreign body was noted. If the foreign body has not passed over the next 4 to 5 days, surgical consultation and CT scan may need to be pursued. In employment was arranged with her primary care physician on Candice 15 to obtain the x-ray and evaluation.  2. Psychiatric appointment through [facility] June 28  3. Individual psychotherapy through [facility] 19  4. Continue case management services and [facility]    DISCHARGE MEDICATIONS:   Discharge Medication List as of 6/13/2017  1:45 PM      CONTINUE these medications which have CHANGED    Details   lamoTRIgine (LAMICTAL) 100 MG tablet Take 1 tablet (100 mg) by mouth daily, Historical         CONTINUE these medications which have NOT CHANGED    Details   acetaminophen (TYLENOL) 500 MG tablet Take 1-2 tablets (500-1,000 mg) by mouth every 8 hours as needed for mild pain, Disp-90 tablet, R-3, E-PrescribePlease discontinue other Tylenol prescriptions.      levonorgestrel (MIRENA) 20 MCG/24HR IUD 1 each (20 mcg) by Intrauterine  route once for 1 doseHistorical      augmented betamethasone dipropionate (DIPROLENE-AF) 0.05 % ointment Apply to AA BID x 2-3 weeks then PRN onlyDisp-45 g, R-3Local Print      traMADol (ULTRAM) 50 MG tablet Take 5 mg by mouth as needed, Historical      ibuprofen (ADVIL/MOTRIN) 600 MG tablet Take 1 tablet (600 mg) by mouth every 6 hours as needed for moderate pain, Disp-30 tablet, R-0, Local Print      albuterol (ALBUTEROL) 108 (90 BASE) MCG/ACT Inhaler Inhale 2 puffs into the lungs every 4 hours as needed for shortness of breath / dyspnea, Disp-1 Inhaler, R-0, Local Print      senna (SENOKOT) 8.6 MG tablet Take 1 tablet by mouth 2 times daily as needed for constipation, Disp-60 tablet, R-0, E-Prescribe      polyethylene glycol (MIRALAX/GLYCOLAX) powder Take 17 g by mouth daily as needed for constipation, Disp-510 g, R-3, Fax      SUMATRIPTAN SUCCINATE PO Take 50 mg by mouth once as needed for migraine Reported on 5/19/2017, Historical      TOPIRAMATE PO Take 50 mg by mouth in the morning and 100 mg by mouth in the evening, Historical      Desvenlafaxine Succinate (PRISTIQ PO) Take 100 mg by mouth daily, Historical      Cholecalciferol (VITAMIN D3 PO) Take 5,000 Units by mouth daily , Historical              LABORATORY RESULTS: (past 14 days)  Recent Results (from the past 336 hour(s))   UA with Microscopic    Collection Time: 07/20/17 12:55 PM   Result Value Ref Range    Color Urine Straw     Appearance Urine Slightly Cloudy     Glucose Urine Negative NEG mg/dL    Bilirubin Urine Negative NEG    Ketones Urine Negative NEG mg/dL    Specific Gravity Urine 1.005 1.003 - 1.035    Blood Urine Moderate (A) NEG    pH Urine 7.0 5.0 - 7.0 pH    Protein Albumin Urine Negative NEG mg/dL    Urobilinogen mg/dL 0.0 0.0 - 2.0 mg/dL    Nitrite Urine Negative NEG    Leukocyte Esterase Urine Negative NEG    Source Midstream Urine     WBC Urine <1 0 - 2 /HPF    RBC Urine 4 (H) 0 - 2 /HPF    Bacteria Urine Few (A) NEG /HPF    Squamous  Epithelial /HPF Urine 4 (H) 0 - 1 /HPF   Drug screen urine    Collection Time: 07/20/17 12:55 PM   Result Value Ref Range    Benzodiazepine Qual Urine  NEG     Negative   Cutoff for a negative benzodiazepine is 200 ng/mL or less.      Cannabinoids Qual Urine  NEG     Negative   Cutoff for a negative cannabinoid is 50 ng/mL or less.      Cocaine Qual Urine  NEG     Negative   Cutoff for a negative cocaine is 300 ng/mL or less.      Opiates Qualitative Urine  NEG     Negative   Cutoff for a negative opiate is 300 ng/mL or less.      Acetaminophen Qual Negative NEG    Amantadine Qual Negative NEG    Amitriptyline Qual Negative NEG    Amoxapine Qual Negative NEG    Amphetamines Qual Negative NEG    Atropine Qual Negative NEG    Caffeine Qual Positive (A) NEG    Carbamazepine Qual Negative NEG    Chlorpheniramine Qual Negative NEG    Chlorpromazine Qual Negative NEG    Citalopram Qual Negative NEG    Clomipramine Qual Negative NEG    Cocaine Qual Negative NEG    Codeine Qual Negative NEG    Desipramine Qual Negative NEG    Dextromethorphan Qual Negative NEG    Diphenhydramine Qual Negative NEG    Doxepin/metabolite Qual Negative NEG    Doxylamine Qual Negative NEG    Ephedrine or pseudo Qual Negative NEG    Fentanyl Qual Negative NEG    Fluoxetine and metab Qual Negative NEG    Hydrocodone Qual Negative NEG    Hydromorphone Qual Negative NEG    Ibuprofen Qual Negative NEG    Imipramine Qual Negative NEG    Lamotrigine Qual Positive (A) NEG    Loxapine Qual Negative NEG    Maprotiline Qual Negative NEG    MDMA Qual Negative NEG    Meperidine Qual Negative NEG    Methadone Qual Negative NEG    Methamphetamine Qual Negative NEG    Morphine Qual Negative NEG    Nicotine Qual Negative NEG    Nortriptyline Qual Negative NEG    Olanzapine Qual Negative NEG    Oxycodone Qual Negative NEG    Pentazocine Qual Negative NEG    Phencyclidine Qual Negative NEG    Phenmetrazine Qual Negative NEG    Phentermine Qual Negative NEG     Phenylbutazone Qual Negative NEG    Phenylpropanolamine Qual Negative NEG    Propoxyphene Qual Negative NEG    Propranolol Qual Negative NEG    Pyrilamine Qual Negative NEG    Salicylate Qual Negative NEG    Theobromine Qual Positive (A) NEG    Trimipramine Qual Negative NEG    Topiramate Qual Positive (A) NEG    Venlafaxine Qual  NEG     Venlafaxine metabolite positive  This test was developed and its performance characteristics determined by the   [facility],  Special Chemistry Laboratory. It has   not been cleared or approved by the FDA. The laboratory is regulated under CLIA   as qualified to perform high-complexity testing. This test is used for clinical   purposes. It should not be regarded as investigational or for research.     Comprehensive metabolic panel    Collection Time: 07/20/17  1:01 PM   Result Value Ref Range    Sodium 141 133 - 144 mmol/L    Potassium 4.0 3.4 - 5.3 mmol/L    Chloride 109 94 - 109 mmol/L    Carbon Dioxide 24 20 - 32 mmol/L    Anion Gap 8 3 - 14 mmol/L    Glucose 86 70 - 99 mg/dL    Urea Nitrogen 9 7 - 30 mg/dL    Creatinine 0.50 (L) 0.52 - 1.04 mg/dL    GFR Estimate >90  Non  GFR Calc   >60 mL/min/1.7m2    GFR Estimate If Black >90   GFR Calc   >60 mL/min/1.7m2    Calcium 9.3 8.5 - 10.1 mg/dL    Bilirubin Total 0.3 0.2 - 1.3 mg/dL    Albumin 3.9 3.4 - 5.0 g/dL    Protein Total 7.5 6.8 - 8.8 g/dL    Alkaline Phosphatase 83 40 - 150 U/L    ALT 26 0 - 50 U/L    AST 23 0 - 45 U/L   Lipase    Collection Time: 07/20/17  1:01 PM   Result Value Ref Range    Lipase 126 73 - 393 U/L   HCG QUALitative    Collection Time: 07/20/17  1:01 PM   Result Value Ref Range    HCG Qualitative Serum (A) NEG     Canceled, Test credited   Unsatisfactory specimen - hemolyzed     Lactic acid whole blood    Collection Time: 07/20/17  1:01 PM   Result Value Ref Range    Lactic Acid 1.5 0.7 - 2.1 mmol/L   HCG qualitative urine    Collection Time:  07/20/17  1:11 PM   Result Value Ref Range    HCG Qual Urine Negative NEG   CBC with platelets differential    Collection Time: 07/20/17  2:10 PM   Result Value Ref Range    WBC 8.0 4.0 - 11.0 10e9/L    RBC Count 4.03 3.8 - 5.2 10e12/L    Hemoglobin 12.0 11.7 - 15.7 g/dL    Hematocrit 36.2 35.0 - 47.0 %    MCV 90 78 - 100 fl    MCH 29.8 26.5 - 33.0 pg    MCHC 33.1 31.5 - 36.5 g/dL    RDW 13.7 10.0 - 15.0 %    Platelet Count 258 150 - 450 10e9/L    Diff Method Automated Method     % Neutrophils 73.8 %    % Lymphocytes 18.2 %    % Monocytes 5.8 %    % Eosinophils 1.6 %    % Basophils 0.4 %    % Immature Granulocytes 0.2 %    Nucleated RBCs 0 0 /100    Absolute Neutrophil 5.9 1.6 - 8.3 10e9/L    Absolute Lymphocytes 1.5 0.8 - 5.3 10e9/L    Absolute Monocytes 0.5 0.0 - 1.3 10e9/L    Absolute Eosinophils 0.1 0.0 - 0.7 10e9/L    Absolute Basophils 0.0 0.0 - 0.2 10e9/L    Abs Immature Granulocytes 0.0 0 - 0.4 10e9/L    Absolute Nucleated RBC 0.0    TSH with free T4 reflex    Collection Time: 07/20/17  2:10 PM   Result Value Ref Range    TSH 1.99 0.40 - 4.00 mU/L   Basic metabolic panel    Collection Time: 07/21/17  6:35 AM   Result Value Ref Range    Sodium 144 133 - 144 mmol/L    Potassium 3.8 3.4 - 5.3 mmol/L    Chloride 114 (H) 94 - 109 mmol/L    Carbon Dioxide 22 20 - 32 mmol/L    Anion Gap 8 3 - 14 mmol/L    Glucose 105 (H) 70 - 99 mg/dL    Urea Nitrogen 9 7 - 30 mg/dL    Creatinine 0.58 0.52 - 1.04 mg/dL    GFR Estimate >90  Non  GFR Calc   >60 mL/min/1.7m2    GFR Estimate If Black >90   GFR Calc   >60 mL/min/1.7m2    Calcium 8.3 (L) 8.5 - 10.1 mg/dL   CBC with platelets differential    Collection Time: 07/21/17  6:35 AM   Result Value Ref Range    WBC 4.7 4.0 - 11.0 10e9/L    RBC Count 3.65 (L) 3.8 - 5.2 10e12/L    Hemoglobin 10.7 (L) 11.7 - 15.7 g/dL    Hematocrit 33.5 (L) 35.0 - 47.0 %    MCV 92 78 - 100 fl    MCH 29.3 26.5 - 33.0 pg    MCHC 31.9 31.5 - 36.5 g/dL    RDW 13.9 10.0 -  15.0 %    Platelet Count 217 150 - 450 10e9/L    Diff Method Automated Method     % Neutrophils 58.9 %    % Lymphocytes 28.0 %    % Monocytes 10.0 %    % Eosinophils 2.5 %    % Basophils 0.4 %    % Immature Granulocytes 0.2 %    Nucleated RBCs 0 0 /100    Absolute Neutrophil 2.8 1.6 - 8.3 10e9/L    Absolute Lymphocytes 1.3 0.8 - 5.3 10e9/L    Absolute Monocytes 0.5 0.0 - 1.3 10e9/L    Absolute Eosinophils 0.1 0.0 - 0.7 10e9/L    Absolute Basophils 0.0 0.0 - 0.2 10e9/L    Abs Immature Granulocytes 0.0 0 - 0.4 10e9/L    Absolute Nucleated RBC 0.0    Comprehensive metabolic panel    Collection Time: 07/29/17 12:32 AM   Result Value Ref Range    Sodium 138 133 - 144 mmol/L    Potassium 5.0 3.4 - 5.3 mmol/L    Chloride 108 94 - 109 mmol/L    Carbon Dioxide 24 20 - 32 mmol/L    Anion Gap 6 3 - 14 mmol/L    Glucose 92 70 - 99 mg/dL    Urea Nitrogen 17 7 - 30 mg/dL    Creatinine 0.52 0.52 - 1.04 mg/dL    GFR Estimate >90  Non  GFR Calc   >60 mL/min/1.7m2    GFR Estimate If Black >90   GFR Calc   >60 mL/min/1.7m2    Calcium 8.6 8.5 - 10.1 mg/dL    Bilirubin Total 0.3 0.2 - 1.3 mg/dL    Albumin 3.4 3.4 - 5.0 g/dL    Protein Total 6.9 6.8 - 8.8 g/dL    Alkaline Phosphatase 71 40 - 150 U/L    ALT 29 0 - 50 U/L    AST 53 (H) 0 - 45 U/L   Lipase    Collection Time: 07/29/17 12:32 AM   Result Value Ref Range    Lipase 100 73 - 393 U/L   HCG QUALitative    Collection Time: 07/29/17 12:32 AM   Result Value Ref Range    HCG Qualitative Serum Negative NEG   Alcohol level blood    Collection Time: 07/29/17 12:32 AM   Result Value Ref Range    Ethanol g/dL <0.01 <0.01 g/dL   CBC with platelets differential    Collection Time: 07/29/17  1:14 AM   Result Value Ref Range    WBC 9.4 4.0 - 11.0 10e9/L    RBC Count 3.88 3.8 - 5.2 10e12/L    Hemoglobin 11.5 (L) 11.7 - 15.7 g/dL    Hematocrit 35.2 35.0 - 47.0 %    MCV 91 78 - 100 fl    MCH 29.6 26.5 - 33.0 pg    MCHC 32.7 31.5 - 36.5 g/dL    RDW 13.6 10.0 - 15.0  %    Platelet Count 260 150 - 450 10e9/L    Diff Method Automated Method     % Neutrophils 74.1 %    % Lymphocytes 16.8 %    % Monocytes 6.6 %    % Eosinophils 2.0 %    % Basophils 0.3 %    % Immature Granulocytes 0.2 %    Nucleated RBCs 0 0 /100    Absolute Neutrophil 6.9 1.6 - 8.3 10e9/L    Absolute Lymphocytes 1.6 0.8 - 5.3 10e9/L    Absolute Monocytes 0.6 0.0 - 1.3 10e9/L    Absolute Eosinophils 0.2 0.0 - 0.7 10e9/L    Absolute Basophils 0.0 0.0 - 0.2 10e9/L    Abs Immature Granulocytes 0.0 0 - 0.4 10e9/L    Absolute Nucleated RBC 0.0    HCG qualitative    Collection Time: 07/30/17 11:55 PM   Result Value Ref Range    HCG Qualitative Serum Negative NEG   UA reflex to Microscopic and Culture    Collection Time: 07/30/17 11:55 PM   Result Value Ref Range    Color Urine Colorless     Appearance Urine Clear     Glucose Urine Negative NEG mg/dL    Bilirubin Urine Negative NEG    Ketones Urine Negative NEG mg/dL    Specific Gravity Urine 1.003 1.003 - 1.035    Blood Urine Negative NEG    pH Urine 7.0 5.0 - 7.0 pH    Protein Albumin Urine Negative NEG mg/dL    Urobilinogen mg/dL 0.0 0.0 - 2.0 mg/dL    Nitrite Urine Negative NEG    Leukocyte Esterase Urine Negative NEG    Source Midstream Urine    Wet prep    Collection Time: 07/31/17 12:16 AM   Result Value Ref Range    Specimen Description Vagina     Wet Prep (A)      No clue cells seen  No Trichomonas seen  No PMNs seen  Moderate Pseudohyphae  Few Budding yeast      Micro Report Status FINAL 07/31/2017    Chlamydia trachomatis PCR    Collection Time: 07/31/17 12:16 AM   Result Value Ref Range    Specimen Description Vagina     Chlamydia Trachomatis PCR  NEG     Negative   Negative for C. trachomatis rRNA by transcription mediated amplification.   A negative result by transcription mediated amplification does not preclude the   presence of C. trachomatis infection because results are dependent on proper   and adequate collection, absence of inhibitors, and sufficient  rRNA to be   detected.     Neisseria gonorrhoea PCR    Collection Time: 07/31/17 12:16 AM   Result Value Ref Range    Specimen Descrip Vagina     N Gonorrhea PCR  NEG     Negative   Negative for N. gonorrhoeae rRNA by transcription mediated amplification.   A negative result by transcription mediated amplification does not preclude the   presence of N. gonorrhoeae infection because results are dependent on proper   and adequate collection, absence of inhibitors, and sufficient rRNA to be   detected.     CBC with platelets differential    Collection Time: 07/31/17 12:35 AM   Result Value Ref Range    WBC 8.3 4.0 - 11.0 10e9/L    RBC Count 3.97 3.8 - 5.2 10e12/L    Hemoglobin 11.5 (L) 11.7 - 15.7 g/dL    Hematocrit 35.6 35.0 - 47.0 %    MCV 90 78 - 100 fl    MCH 29.0 26.5 - 33.0 pg    MCHC 32.3 31.5 - 36.5 g/dL    RDW 13.7 10.0 - 15.0 %    Platelet Count 285 150 - 450 10e9/L    Diff Method Automated Method     % Neutrophils 68.7 %    % Lymphocytes 22.6 %    % Monocytes 6.6 %    % Eosinophils 1.8 %    % Basophils 0.2 %    % Immature Granulocytes 0.1 %    Absolute Neutrophil 5.7 1.6 - 8.3 10e9/L    Absolute Lymphocytes 1.9 0.8 - 5.3 10e9/L    Absolute Monocytes 0.6 0.0 - 1.3 10e9/L    Absolute Eosinophils 0.2 0.0 - 0.7 10e9/L    Absolute Basophils 0.0 0.0 - 0.2 10e9/L    Abs Immature Granulocytes 0.0 0 - 0.4 10e9/L   Comprehensive metabolic panel    Collection Time: 07/31/17 12:35 AM   Result Value Ref Range    Sodium 143 133 - 144 mmol/L    Potassium 3.6 3.4 - 5.3 mmol/L    Chloride 108 94 - 109 mmol/L    Carbon Dioxide 25 20 - 32 mmol/L    Anion Gap 10 3 - 14 mmol/L    Glucose 92 70 - 99 mg/dL    Urea Nitrogen 14 7 - 30 mg/dL    Creatinine 0.55 0.52 - 1.04 mg/dL    GFR Estimate >90  Non  GFR Calc   >60 mL/min/1.7m2    GFR Estimate If Black >90   GFR Calc   >60 mL/min/1.7m2    Calcium 8.8 8.5 - 10.1 mg/dL    Bilirubin Total 0.3 0.2 - 1.3 mg/dL    Albumin 4.1 3.4 - 5.0 g/dL    Protein  Total 7.0 6.8 - 8.8 g/dL    Alkaline Phosphatase 71 40 - 150 U/L    ALT 27 0 - 50 U/L    AST 15 0 - 45 U/L   EKG 12 lead    Collection Time: 07/31/17  5:01 PM   Result Value Ref Range    Interpretation ECG Click View Image link to view waveform and result    Drug abuse screen 77 urine ([place],[facility])    Collection Time: 07/31/17  5:22 PM   Result Value Ref Range    Amphetamine Qual Urine  NEG     Negative   Cutoff for a negative amphetamine is 500 ng/mL or less.      Barbiturates Qual Urine  NEG     Negative   Cutoff for a negative barbiturate is 200 ng/mL or less.      Benzodiazepine Qual Urine  NEG     Negative   Cutoff for a negative benzodiazepine is 200 ng/mL or less.      Cannabinoids Qual Urine  NEG     Negative   Cutoff for a negative cannabinoid is 50 ng/mL or less.      Cocaine Qual Urine  NEG     Negative   Cutoff for a negative cocaine is 300 ng/mL or less.      Opiates Qualitative Urine (A) NEG     Positive   Cutoff for a positive opiate is greater than 300 ng/mL. This is an unconfirmed   screening result to be used for medical purposes only.      PCP Qual Urine  NEG     Negative   Cutoff for a negative PCP is 25 ng/mL or less.         >30 minutes was spent on this discharge to allow for reviewing the patient's response to treatment, reviewing plan of care, education on medications and diagnosis, and conducting a risk assessment.

## 2017-08-02 NOTE — NURSING NOTE
Chief Complaint   Patient presents with     Consult For     Endometriosis       See PADMA Pryor 8/2/2017

## 2017-08-02 NOTE — MR AVS SNAPSHOT
After Visit Summary   8/2/2017    Nevin Alvarado    MRN: [id number]           Patient Information     Date Of Birth          1990        Visit Information        Provider Department      8/2/2017 1:45 PM Mariama Mcallister MD [facility]        Today's Diagnoses     Chronic pelvic pain in female    -  1      Care Instructions    We recommend you take ibuprofen 300 - 600 mg every 6 hours scheduled  Physical therapy referral  Have some physical activity every day (walking, stretching, yoga, swimming)    A laparoscopy for endometriosis would not be a safe or reasonable option.     We also discussed trying oral contraceptive pills, Lupron. Please come back if you change your mind.     Mariama Mcallister  OB/Gyn  8/2/2017, 3:07 PM              Follow-ups after your visit        Additional Services     PHYSICAL THERAPY REFERRAL       *This therapy referral will be filtered to a centralized scheduling office at [facility] and the patient will receive a call to schedule an appointment at a [place] location most convenient for them. *     [facility] provides Physical Therapy evaluation and treatment and many specialty services across the [place] system.  If requesting a specialty program, please choose from the list below.    If you have not heard from the scheduling office within 2 business days, please call [phone] for all locations, with the exception of [place], please call [phone].  Treatment: Evaluation & Treatment  Special Instructions/Modalities: Pelvic/perineal pain  Special Programs: Pelvic    Please be aware that coverage of these services is subject to the terms and limitations of your health insurance plan.  Call member services at your health plan with any benefit or coverage questions.      **Note to Provider:  If you are referring outside of [place] for the therapy appointment, please list the name of the location in the  "\"special instructions\" above, print the referral and give to the patient to schedule the appointment.                  Who to contact     Please call your clinic at [phone] to:    Ask questions about your health    Make or cancel appointments    Discuss your medicines    Learn about your test results    Speak to your doctor   If you have compliments or concerns about an experience at your clinic, or if you wish to file a complaint, please contact [facility] Patient Relations at [phone] or email us at [email or web address]         Additional Information About Your Visit        [facility] Information     [facility] gives you secure access to your electronic health record. If you see a primary care provider, you can also send messages to your care team and make appointments. If you have questions, please call your primary care clinic.  If you do not have a primary care provider, please call [phone] and they will assist you.      [facility] is an electronic gateway that provides easy, online access to your medical records. With [facility], you can request a clinic appointment, read your test results, renew a prescription or communicate with your care team.     To access your existing account, please contact your [facility] or call [phone] for assistance.        Care EveryWhere ID     This is your Care EveryWhere ID. This could be used by other organizations to access your [place] medical records  [id number]        Your Vitals Were     Pulse Height Last Period Breastfeeding? BMI (Body Mass Index)       69 1.549 m (5' 1\") 06/16/2017 (Approximate) No 43.48 kg/m2        Blood Pressure from Last 3 Encounters:   08/02/17 131/82   07/31/17 149/83   07/31/17 123/81    Weight from Last 3 Encounters:   08/02/17 104.4 kg (230 lb 1.6 oz)   07/31/17 102.5 kg (226 lb)   07/28/17 99.8 kg (220 lb)              We Performed the Following     PHYSICAL THERAPY " REFERRAL        Primary Care Provider Office Phone # Fax #    Sandra Ramos [phone]       [place] [address] [facility] [address]        Equal Access to Services     ZENON DIAMOND : Hadii zaire ku hadlupiso Soomaali, waaxda luqadaha, qaybta kaalmada adeegyada, waxque hin mickey taylor laJacquelineabiel persaud. So [facility] [phone].    ATENCIÓN: Si habla español, tiene a singh disposición servicios gratuitos de asistencia lingüística. Llame al [phone].    We comply with applicable federal civil rights laws and [place] laws. We do not discriminate on the basis of race, color, national origin, age, disability sex, sexual orientation or gender identity.            Thank you!     Thank you for choosing [facility]  for your care. Our goal is always to provide you with excellent care. Hearing back from our patients is one way we can continue to improve our services. Please take a few minutes to complete the written survey that you may receive in the mail after your visit with us. Thank you!             Your Updated Medication List - Protect others around you: Learn how to safely use, store and throw away your medicines at www.disposemymeds.org.          This list is accurate as of: 8/2/17  3:08 PM.  Always use your most recent med list.                   Brand Name Dispense Instructions for use Diagnosis    acetaminophen 500 MG tablet    TYLENOL    90 tablet    Take 1-2 tablets (500-1,000 mg) by mouth every 8 hours as needed for mild pain    Moderate pain       docosanol 10 % Crea cream    ABREVA    2 g    Apply topically 5 times daily As needed    Recurrent cold sores       fluconazole 150 MG tablet    DIFLUCAN    2 tablet    Take one tablet now, and one tablet in three days        ibuprofen 200 MG tablet    ADVIL/MOTRIN    30 tablet    Take 3 tablets (600 mg) by mouth every 8 hours as needed for mild pain        LAMICTAL 100 MG tablet   Generic drug:   lamoTRIgine      Take 150 mg by mouth At Bedtime        levonorgestrel 20 MCG/24HR IUD    MIRENA     1 each (20 mcg) by Intrauterine route once for 1 dose        meclizine 12.5 MG tablet    ANTIVERT    30 tablet    Take 2 tablets (25 mg) by mouth 4 times daily as needed for dizziness        OLANZapine 5 MG tablet    zyPREXA    10 tablet    Take 1 tablet (5 mg) orally at bedtime prn        ondansetron 4 MG tablet    ZOFRAN    10 tablet    Take 1 tablet (4 mg) by mouth every 8 hours as needed for nausea        oxyCODONE 5 MG IR tablet    ROXICODONE    5 tablet    Take 1 tablet (5 mg) by mouth every 6 hours as needed for pain        PRISTIQ PO      Take 100 mg by mouth every morning        SUMATRIPTAN SUCCINATE PO      Take 50 mg by mouth once as needed for migraine Reported on 5/19/2017        TOPAMAX 50 MG tablet   Generic drug:  topiramate      Take 25 mg by mouth At Bedtime        VITAMIN D3 PO      Take 5,000 Units by mouth every morning

## 2017-08-02 NOTE — PROGRESS NOTES
"Nevin Alvarado, [id number]  Initial Gynecologic Consult    CC: Pelvic pain    S: Ms Alvarado is a 26 year old G0 who comes for a second opinion regarding her pelvic pain. Nevin tells me that the pain is nearly constant, present every single day. It started two months ago. It comes and goes in 1-2 hour increments. It very much reminds her of pain that she had prior to a laparoscopic endometriosis ablation in 2015 with Dr. Ramirez at [facility]. She tells me that the surgery helped her pain a great deal and she was nearly pain-free in the interim. This is discrepant from the many visits in her chart (dozens) with chief complaint \"abdominal pain\" in 4360-6771, that is, following her LSC ablation.     She says she has not lost weight. No fevers, chills, nausea, vomiting. Some constipation. She takes ibuprofen occasionally for the pain. She also occasionally receives narcotic prescriptions, most recently 7/31 in the ED here (five 5 mg oxycodone pills). She was given diflucan on 7/31 in the ED (two tabs 3 days apart).     She has had >20 ED visits in the past 4 months for a variety of reasons, ranging from all types of abdominal pain to nausea, falls, dizziness, and foreign bodies in vagina, rectum and upper GI tract. She has a history of swallowing/inserting things such as small springs and staples. She tells me \"I haven't done that in a long time,\" in her chart the last is 6/17/17 (swallowed a spring). She has had multiple procedures, mostly endoscopy (flex sig and EGD), but also had a laparotomy in January 2017 with Dr. Alejandro Garcia to remove an object that was so far lodged into her colon that it could not be removed.     After mentioning that this does not seem like typical endometriosis pain, Nevin says she does sometimes have vaginal spotting (Mirena only) and that the pain is worse at those times. She also thinks that the pain is related to her recurrent ovarian cysts that she has been getting ever " "since she Mirena IUD.     She lives at a group home but is looking for an apartment of her own. She does not work. She does not like physical activity because it makes her back and her joints hurt. She sees a psychiatrist but can't really tell me where.     O:   Patient Vitals for the past 4 hrs:   BP Pulse Height Weight   08/02/17 1422 131/82 69 1.549 m (5' 1\") 104.4 kg (230 lb 1.6 oz)     Gen: tearful with discussion, otherwise NAD and moves easily  Abdo: Minimal to moderate central/bilateral pelvic tenderness with pressure  Pelvic: small amounts of thick white discharge consistent with candidiasis on perineum. Otherwise external genitalia appear normal; no obvious irritation. \"Deep\" pain at posterior fornix and L>R introitus over iliococcygeus and puborectalis muscles. Minimal lateral/anterior pain. No CMT. On speculum exam normal appearing cervix with IUD strings about 1 cm at os.     [facility] [place] biopsies (2015, [facility]) - endometriosis lesions    Results for ABAD TONG ZAN (MRN [id number]) as of 8/2/2017 16:55   7/31/2017 17:22   Amphetamine Qual Urine Negative...   Cocaine Qual Urine Negative...   Opiates Qualitative Urine Positive... (A)   Cannabinoids Qual Urine Negative...   Barbiturates Qual Urine Negative...   Pcp Qual Urine Negative...   Benzodiazepine Qual Urine Negative...     Results for ABAD TONG (MRN [id number]) as of 8/2/2017 16:55   7/31/2017 00:35   WBC 8.3   Hemoglobin 11.5 (L)   Hematocrit 35.6   Platelet Count 285   RBC Count 3.97   MCV 90   MCH 29.0   MCHC 32.3   RDW 13.7       IMPRESSION:   1. IUD appears to be in good position within the endometrium.  2. A 2.1 cm simple cyst in the left ovary may represent a dominant follicle.  3. The exam was somewhat limited related to patient body habitus.    A/P  26 year old G0 with chronic pelvic pain requesting operative management. I do not think this pain is consistent with endometriosis based on is temporal pattern.     As noted " above, in my discussion with Nevin, we discussed the many other reasons she might have pelvic pain. Psychologic factors, remote trauma, scar tissue from surgeries, physical trauma from inserted foreign bodies all may play a role.     Her cysts, which have been followed on many ultrasounds, are transient, small, benign, and appear physiologic. They do not require follow up.    I recommended  - Pelvic physical therapy - she declined; but order placed.   - OCPs - she declined as they have previously caused her nausea  - Scheduled ibuprofen (take 300 rather than 600 q6h scheduled as it gives her GERD)  - Last line Lupron trial - to determine whether this is endometriosis (although I do not think it is) - she is not interested at all    Discussed with Dr. More.     Mariama Mcallister, PGY3  OB/Gyn  8/2/2017, 4:52 PM      I agree with note as above.  Assessment and plan were jointly made.  Agatha More MD

## 2017-08-02 NOTE — PATIENT INSTRUCTIONS
We recommend you take ibuprofen 300 - 600 mg every 6 hours scheduled  Physical therapy referral  Have some physical activity every day (walking, stretching, yoga, swimming)    A laparoscopy for endometriosis would not be a safe or reasonable option.     We also discussed trying oral contraceptive pills, Lupron. Please come back if you change your mind.     Mariama Mcallister  OB/Gyn  8/2/2017, 3:07 PM

## 2017-08-09 ENCOUNTER — TELEPHONE (OUTPATIENT)
Dept: INTERNAL MEDICINE | Facility: CLINIC | Age: 27
End: 2017-08-09

## 2017-08-09 NOTE — TELEPHONE ENCOUNTER
CARMEN Mcelroy with [facility] called and asked for Dr. Ramos's NPI number. Writer provided.

## 2017-08-10 LAB — PHQ9 SCORE: 8

## 2017-08-13 ENCOUNTER — TRANSFERRED RECORDS (OUTPATIENT)
Dept: HEALTH INFORMATION MANAGEMENT | Facility: CLINIC | Age: 27
End: 2017-08-13

## 2017-08-16 ENCOUNTER — HOSPITAL ENCOUNTER (EMERGENCY)
Facility: CLINIC | Age: 27
Discharge: HOME | End: 2017-08-16
Attending: NURSE PRACTITIONER | Admitting: NURSE PRACTITIONER
Payer: MEDICARE

## 2017-08-16 ENCOUNTER — NURSE TRIAGE (OUTPATIENT)
Dept: NURSING | Facility: CLINIC | Age: 27
End: 2017-08-16

## 2017-08-16 VITALS
TEMPERATURE: 98.5 F | BODY MASS INDEX: 43.43 KG/M2 | SYSTOLIC BLOOD PRESSURE: 145 MMHG | DIASTOLIC BLOOD PRESSURE: 80 MMHG | OXYGEN SATURATION: 95 % | HEIGHT: 61 IN | WEIGHT: 230 LBS | HEART RATE: 107 BPM | RESPIRATION RATE: 16 BRPM

## 2017-08-16 DIAGNOSIS — Z51.89 VISIT FOR WOUND CHECK: ICD-10-CM

## 2017-08-16 PROCEDURE — 99282 EMERGENCY DEPT VISIT SF MDM: CPT

## 2017-08-16 ASSESSMENT — ENCOUNTER SYMPTOMS
COLOR CHANGE: 0
WOUND: 1
FEVER: 0
ABDOMINAL PAIN: 0

## 2017-08-16 NOTE — ED AVS SNAPSHOT
[facility] Emergency Department    [address]    [facility] [address]    Phone:  [phone]    Fax:  [phone]                                       Nevin Alvarado   MRN: [id number]    Department:  [facility] Emergency Department   Date of Visit:  8/16/2017           After Visit Summary Signature Page     I have received my discharge instructions, and my questions have been answered. I have discussed any challenges I see with this plan with the nurse or doctor.    ..........................................................................................................................................  Patient/Patient Representative Signature      ..........................................................................................................................................  Patient Representative Print Name and Relationship to Patient    ..................................................               ................................................  Date                                            Time    ..........................................................................................................................................  Reviewed by Signature/Title    ...................................................              ..............................................  Date                                                            Time

## 2017-08-16 NOTE — ED AVS SNAPSHOT
[facility] Emergency Department    [address] [place] [address]    Phone:  [phone]    Fax:  [phone]                                       Nevin Alvarado   MRN: [id number]    Department:  [facility] Emergency Department   Date of Visit:  8/16/2017           Patient Information     Date Of Birth          1990        Your diagnoses for this visit were:     Visit for wound check        You were seen by Ivan Henry APRN CNP.      Follow-up Information     Follow up with your surgeon as discussed.  You may call him if further concerns.        Discharge Instructions       Use dressing with gauze as we discussed.  Watch for signs of infection as we discussed.         Wound Care    You have a break in the skin. This wound may be because of an injury. Or it may be the result of surgery. Closing the wound helps stop bleeding, protects the wound from infection, and speeds healing. The type of closure that is used depends on the size and location of the wound. Choices include stitches (sutures), strips of surgical tape, skin glue, or staples.  Home care  Your healthcare provider may prescribe medicines for pain. Or he or she may suggest an over-the-counter (OTC) pain reliever, such as ibuprofen. If you have chronic kidney disease, talk with your provider before taking any OTC medicines. Also talk with your provider if you've had a stomach ulcer or gastrointestinal bleeding. In certain cases, antibiotics may be prescribed to help prevent infection. If antibiotics are prescribed, take them exactly as directed for as long as directed. Do not stop taking your antibiotics until they are all gone, even if you feel better.  General care    Follow the healthcare provider s instructions on how to care for the wound.    Wash your hands with soap and warm water before and after caring for the wound. This helps prevent infection.    If a bandage was applied,  change it once a day or as directed. If at any time the bandage becomes wet or dirty, replace it with a new one.    Unless told otherwise, avoid soaking the wound in water. Take showers or sponge baths instead of tub baths. Don't scrub or pick at the wound.    Don't go swimming.    If you have a bandage and it gets wet, use a clean cloth to gently pat the wound dry. Then replace the bandage with a dry one.    Don't scratch, rub, or pick at the area.    Watch for the signs of infection listed below. Any wound can get infected, even if you are taking antibiotics. Seek care right away if you see any possible signs of infection.  Care for specific closures    Sutures. You may want to clean the wound daily after the first 2 to 3 days. To do this, remove the bandage and gently wash the area with soap and warm water. After cleaning, apply a thin layer of antibiotic ointment if recommended. Then apply a new bandage. Sutures on the outside of the skin usually need to be removed by your healthcare provider.    Surgical tape. Keep the area dry. If it gets wet, blot it dry with a towel. Surgical tape closures usually fall off within 7 to 10 days. If they have not fallen off after 10 days, you can remove them yourself. To remove the tape, use mineral oil or petroleum jelly on a cotton ball to gently rub the adhesive.    Skin glue. You may shower or bathe as usual, but do not use soaps, lotions, or ointments on the wound area. Do not scrub the wound. After bathing, pat the wound dry with a soft towel. Do not apply liquids like peroxide, ointments, or creams to the wound while the strips or film is in place. Don't scratch, rub, or pick at the strips or film. Don't put tape directly over the strips or film. Skin adhesive film will fall off naturally in 5 to 10 days. If it does not peel off in 10 days, gently rub petroleum jelly or an ointment onto the film.    [place]. Take showers or sponge baths. Don't take tub baths. Don't use  lotions on the wound area. The area may be cleaned with soap and water 2 to 3 days after the wound was stapled. Don't scrub the wound. Pat it dry with a clean soft cloth or towel. You can use antibiotic ointment if your provider tells you to. Staples will need to be removed by your healthcare provider in 10 to 14 days.  Follow-up care  Follow up with your healthcare provider, or as directed. If you have sutures or staples, return for their removal as directed.  When to seek medical advice  Call your healthcare provider right away if you have signs of infection:    Fever of 100.4 F (38 C) or higher, or as directed by your healthcare provider    Increasing pain in the wound    Increasing redness or swelling    Pus or bad-smelling drainage from the wound  Also call your provider right away if any of these occur:    Wound bleeds more than a small amount or won t stop bleeding    Wound edges come apart    Numbness or weakness in the wound area that doesn t go away  Date Last Reviewed: 1/7/2016 2000-2017 [employer]. [address] [place] [address]. All rights reserved. This information is not intended as a substitute for professional medical care. Always follow your healthcare professional's instructions.          24 Hour Appointment Hotline       To make an appointment at any [facility], call [phone] ([phone]). If you don't have a family doctor or clinic, we will help you find one. [facility] clinics are conveniently located to serve the needs of you and your family.             Review of your medicines      Our records show that you are taking the medicines listed below. If these are incorrect, please call your family doctor or clinic.        Dose / Directions Last dose taken    acetaminophen 500 MG tablet   Commonly known as:  TYLENOL   Dose:  500-1000 mg   Quantity:  90 tablet        Take 1-2 tablets (500-1,000 mg) by mouth every 8 hours as needed for mild pain   Refills:   3        docosanol 10 % Crea cream   Commonly known as:  ABREVA   Quantity:  2 g        Apply topically 5 times daily As needed   Refills:  3        ibuprofen 200 MG tablet   Commonly known as:  ADVIL/MOTRIN   Dose:  600 mg   Quantity:  30 tablet        Take 3 tablets (600 mg) by mouth every 8 hours as needed for mild pain   Refills:  0        LAMICTAL 100 MG tablet   Dose:  150 mg   Generic drug:  lamoTRIgine        Take 150 mg by mouth At Bedtime   Refills:  0        levonorgestrel 20 MCG/24HR IUD   Commonly known as:  MIRENA   Dose:  1 each        1 each (20 mcg) by Intrauterine route once for 1 dose   Refills:  0        meclizine 12.5 MG tablet   Commonly known as:  ANTIVERT   Dose:  25 mg   Quantity:  30 tablet        Take 2 tablets (25 mg) by mouth 4 times daily as needed for dizziness   Refills:  0        OLANZapine 5 MG tablet   Commonly known as:  zyPREXA   Quantity:  10 tablet        Take 1 tablet (5 mg) orally at bedtime prn   Refills:  0        ondansetron 4 MG tablet   Commonly known as:  ZOFRAN   Dose:  4 mg   Quantity:  10 tablet        Take 1 tablet (4 mg) by mouth every 8 hours as needed for nausea   Refills:  0        oxyCODONE 5 MG IR tablet   Commonly known as:  ROXICODONE   Dose:  5 mg   Quantity:  5 tablet        Take 1 tablet (5 mg) by mouth every 6 hours as needed for pain   Refills:  0        PRISTIQ PO   Dose:  100 mg        Take 100 mg by mouth every morning   Refills:  0        SUMATRIPTAN SUCCINATE PO   Dose:  50 mg        Take 50 mg by mouth once as needed for migraine Reported on 5/19/2017   Refills:  0        TOPAMAX 50 MG tablet   Dose:  25 mg   Generic drug:  topiramate        Take 25 mg by mouth At Bedtime   Refills:  0        VITAMIN D3 PO   Dose:  5000 Units        Take 5,000 Units by mouth every morning   Refills:  0                Orders Needing Specimen Collection     None      Pending Results     No orders found from 8/14/2017 to 8/17/2017.            Pending Culture Results      No orders found from 8/14/2017 to 8/17/2017.            Pending Results Instructions     If you had any lab results that were not finalized at the time of your Discharge, you can call the ED Lab Result RN at [phone]. You will be contacted by this team for any positive Lab results or changes in treatment. The nurses are available 7 days a week from 10A to 6:30P.  You can leave a message 24 hours per day and they will return your call.        Test Results From Your Hospital Stay               Clinical Quality Measure: Blood Pressure Screening     Your blood pressure was checked while you were in the emergency department today. The last reading we obtained was  BP: 145/88 . Please read the guidelines below about what these numbers mean and what you should do about them.  If your systolic blood pressure (the top number) is less than 120 and your diastolic blood pressure (the bottom number) is less than 80, then your blood pressure is normal. There is nothing more that you need to do about it.  If your systolic blood pressure (the top number) is 120-139 or your diastolic blood pressure (the bottom number) is 80-89, your blood pressure may be higher than it should be. You should have your blood pressure rechecked within a year by a primary care provider.  If your systolic blood pressure (the top number) is 140 or greater or your diastolic blood pressure (the bottom number) is 90 or greater, you may have high blood pressure. High blood pressure is treatable, but if left untreated over time it can put you at risk for heart attack, stroke, or kidney failure. You should have your blood pressure rechecked by a primary care provider within the next 4 weeks.  If your provider in the emergency department today gave you specific instructions to follow-up with your doctor or provider even sooner than that, you should follow that instruction and not wait for up to 4 weeks for your follow-up visit.        Thank you for  choosing [facility]       Thank you for choosing [facility] for your care. Our goal is always to provide you with excellent care. Hearing back from our patients is one way we can continue to improve our services. Please take a few minutes to complete the written survey that you may receive in the mail after you visit with us. Thank you!        [facility] Information     [email or web address] gives you secure access to your electronic health record. If you see a primary care provider, you can also send messages to your care team and make appointments. If you have questions, please call your primary care clinic.  If you do not have a primary care provider, please call [phone] and they will assist you.        Care EveryWhere ID     This is your Care EveryWhere ID. This could be used by other organizations to access your [facility] medical records  [id number]        Equal Access to Services     ZENON DIAMOND : Gabriel Collado, erick singh, taylor tenorio, mic persaud. So [facility] [phone].    ATENCIÓN: Si habla español, tiene a singh disposición servicios gratuitos de asistencia lingüística. Llame al [phone].    We comply with applicable federal civil rights laws and [place] laws. We do not discriminate on the basis of race, color, national origin, age, disability sex, sexual orientation or gender identity.            After Visit Summary       This is your record. Keep this with you and show to your community pharmacist(s) and doctor(s) at your next visit.

## 2017-08-17 ENCOUNTER — TELEPHONE (OUTPATIENT)
Dept: INTERNAL MEDICINE | Facility: CLINIC | Age: 27
End: 2017-08-17

## 2017-08-17 NOTE — TELEPHONE ENCOUNTER
Pt is post op and was seen last night for wound check in ER but is now having wheezing and cough  And some chest discomfort . No shortness of breath , no fever . Recommended to come to Urgent care to be assessed and possible chest xray .Linda Huang RN

## 2017-08-17 NOTE — ED NOTES
ABD pad applied over abdominal incision.  Abdominal binder then secured in place.  Provided pt with an abd pad for at home.

## 2017-08-17 NOTE — ED NOTES
Bed: ED32  Expected date: 8/16/17  Expected time: 9:23 PM  Means of arrival: Ambulance  Comments:   Med 2

## 2017-08-17 NOTE — TELEPHONE ENCOUNTER
"  Reason for Disposition    [1] Bleeding from incision AND [2] won't stop after 10 minutes of direct pressure     \"I had emergency surgery on Saturday to remove a foreign body from my colon\".  Caller had just removed her abdominal binder and said that incision \"is spurting blood\".  Advised caller to reinforce wound by putting clean bandage on, if bleeding though, layer on top, don't remove, call 911 due to the amount of blood that caller described.    Additional Information    Negative: [1] Major abdominal surgical incision AND [2] wound gaping open AND [3] visible internal organs    Negative: Sounds like a life-threatening emergency to the triager    Protocols used: POST-OP INCISION SYMPTOMS-ADULT-      Ilana Foster RN  [facility]      "

## 2017-08-17 NOTE — ED PROVIDER NOTES
History     Chief Complaint:  Wound Check      The history is provided by the patient.      Nevin Alvarado is a 26 year old female who arrived via EMS and presents for wound check. The patient had abdominal surgery at [facility] on 08/13/17 for removal of a foreign object. She awoke from her nap and noticed some discharge, red tinged clear fluid, from her wound that soaked through a couple paper towels prompting her to contact EMS. She reports they did not want to bring her to [facility] and instead brought her to this emergency department. She has had no increase in pain, redness, swelling or fever. She has no other complaints. Patient has follow up scheduled 8/22.     Allergies:  Augmentin  Blood-Group Specific Substance  Hydrocodone  Influenza Vaccines  Oseltamivir  Vicodin [Hydrocodone-Acetaminophen]  Cefazolin  Cephalosporins     Medications:    Oxycodone  Zofran   Lamictal   Sumatriptan succinate   Pristiq  Antivert   Zyprexa  Topamax     Past Medical History:    Suicidal ideation   Depression   Borderline personality disorder  Anxiety  ADD  PTSD  Morbid Obesity   Anorexia  Migraine    Past Surgical History:    Esophagoscopy  Gastroscopy   Duodenoscopy   Exam under anesthesia anus  HC remove fecal impaction or FB with anesthesia  Knee surgery   Laparoscopic ablation endometriosis  Laparotomy Exploratory   Lymph nodes removed from neck   Mammoplasty reduction   Release carpal tunnel   Sigmoidoscopy flexible     Family History:    Cerebrovascular disease     Social History:  Presents via EMS  Tobacco use: No  Alcohol use: No   PCP: Sandra Ramos    Marital Status:  Single    Review of Systems   Constitutional: Negative for fever.   Gastrointestinal: Negative for abdominal pain.   Skin: Positive for wound. Negative for color change.   All other systems reviewed and are negative.      Physical Exam     Patient Vitals for the past 24 hrs:   BP Temp Temp src Pulse Resp SpO2 Height Weight   08/16/17 2248 - - - -  "- 95 % - -   08/16/17 2247 145/80 - - - - 93 % - -   08/16/17 2139 145/88 98.5  F (36.9  C) Oral 107 16 95 % 1.549 m (5' 1\") 104.3 kg (230 lb)      Physical Exam  General: Alert, No obvious discomfort, well kept  Eyes: PERRL, conjunctivae pink no scleral icterus or conjunctival injection  ENT:   Moist mucus membranes, posterior oropharynx clear without erythema or exudates, No lymphadenopathy, Normal voice  Resp:  Lungs clear to auscultation bilaterally, no crackles/rubs/wheezes. Good air movement  CV:  Normal rate and rhythm, no murmurs/rubs/gallops  GI:  Obese abdomen. Abdomen soft. Recent midline lower abdominal surgical incision closed with staples, no induration, nontender, mild serosanguinous drainage with deep palpation. Normal bowel sounds. No masses  Skin:  Warm, dry.  No rashes or petechiae  Musculoskeletal: No peripheral edema or calf tenderness, Normal gross ROM   Neuro: Alert and oriented to person/place/time, normal sensation  Psychiatric: Normal affect, cooperative, good eye contact     Emergency Department Course     Emergency Department Course:  The patient arrived in the emergency department via EMS.  Past medical records, nursing notes, and vitals reviewed.  2144: I performed an exam of the patient as documented above. Clinical findings and plan explained to the Patient. Patient discharged home with instructions regarding supportive care, medications, and reasons to return as well as the importance of close follow-up were reviewed.      Impression & Plan    Medical Decision Making:  Nevin Alvarado is a 26 year old female who presents today for evaluation of surgical wound.  3 days ago she had surgical removal of foreign body placed in her rectum.  Today when standing up she noted blood tinged fluid draining from her abdomen was concerned and presented here for evaluation.  She did not have any significant discomfort, fevers, or signs of infection.  Her exam is consistent with appropriate " healing from recent surgical procedure.  She does have a obese abdomen and only indication for drainage was serosanguineous fluid.  No imaging was indicated she is continuing to have bowel movements and passed gas without difficulty.  She does have appropriate follow-up scheduled with surgeon however will contact surgeon tomorrow if she has further questions.  Will return to the ER if she develops signs and symptoms of infection that were discussed in depth.      Diagnosis:    ICD-10-CM    1. Visit for wound check Z51.89        Disposition:  Discharged to home with plan as outlined.      Brent Castellon  8/16/2017   [facility] EMERGENCY DEPARTMENT  IBrent, am serving as a scribe at 9:44 PM on 8/16/2017 to document services personally performed by Ivan Henry APRN CNP based on my observations and the provider's statements to me.       Ivan Henry APRN CNP  08/17/17 0001

## 2017-08-17 NOTE — DISCHARGE INSTRUCTIONS
Use dressing with gauze as we discussed.  Watch for signs of infection as we discussed.         Wound Care    You have a break in the skin. This wound may be because of an injury. Or it may be the result of surgery. Closing the wound helps stop bleeding, protects the wound from infection, and speeds healing. The type of closure that is used depends on the size and location of the wound. Choices include stitches (sutures), strips of surgical tape, skin glue, or staples.  Home care  Your healthcare provider may prescribe medicines for pain. Or he or she may suggest an over-the-counter (OTC) pain reliever, such as ibuprofen. If you have chronic kidney disease, talk with your provider before taking any OTC medicines. Also talk with your provider if you've had a stomach ulcer or gastrointestinal bleeding. In certain cases, antibiotics may be prescribed to help prevent infection. If antibiotics are prescribed, take them exactly as directed for as long as directed. Do not stop taking your antibiotics until they are all gone, even if you feel better.  General care    Follow the healthcare provider s instructions on how to care for the wound.    Wash your hands with soap and warm water before and after caring for the wound. This helps prevent infection.    If a bandage was applied, change it once a day or as directed. If at any time the bandage becomes wet or dirty, replace it with a new one.    Unless told otherwise, avoid soaking the wound in water. Take showers or sponge baths instead of tub baths. Don't scrub or pick at the wound.    Don't go swimming.    If you have a bandage and it gets wet, use a clean cloth to gently pat the wound dry. Then replace the bandage with a dry one.    Don't scratch, rub, or pick at the area.    Watch for the signs of infection listed below. Any wound can get infected, even if you are taking antibiotics. Seek care right away if you see any possible signs of infection.  Care for specific  closures    Sutures. You may want to clean the wound daily after the first 2 to 3 days. To do this, remove the bandage and gently wash the area with soap and warm water. After cleaning, apply a thin layer of antibiotic ointment if recommended. Then apply a new bandage. Sutures on the outside of the skin usually need to be removed by your healthcare provider.    Surgical tape. Keep the area dry. If it gets wet, blot it dry with a towel. Surgical tape closures usually fall off within 7 to 10 days. If they have not fallen off after 10 days, you can remove them yourself. To remove the tape, use mineral oil or petroleum jelly on a cotton ball to gently rub the adhesive.    Skin glue. You may shower or bathe as usual, but do not use soaps, lotions, or ointments on the wound area. Do not scrub the wound. After bathing, pat the wound dry with a soft towel. Do not apply liquids like peroxide, ointments, or creams to the wound while the strips or film is in place. Don't scratch, rub, or pick at the strips or film. Don't put tape directly over the strips or film. Skin adhesive film will fall off naturally in 5 to 10 days. If it does not peel off in 10 days, gently rub petroleum jelly or an ointment onto the film.    [place]. Take showers or sponge baths. Don't take tub baths. Don't use lotions on the wound area. The area may be cleaned with soap and water 2 to 3 days after the wound was stapled. Don't scrub the wound. Pat it dry with a clean soft cloth or towel. You can use antibiotic ointment if your provider tells you to. Staples will need to be removed by your healthcare provider in 10 to 14 days.  Follow-up care  Follow up with your healthcare provider, or as directed. If you have sutures or staples, return for their removal as directed.  When to seek medical advice  Call your healthcare provider right away if you have signs of infection:    Fever of 100.4 F (38 C) or higher, or as directed by your healthcare  provider    Increasing pain in the wound    Increasing redness or swelling    Pus or bad-smelling drainage from the wound  Also call your provider right away if any of these occur:    Wound bleeds more than a small amount or won t stop bleeding    Wound edges come apart    Numbness or weakness in the wound area that doesn t go away  Date Last Reviewed: 1/7/2016 2000-2017 [employer]. [address] [place] [address]. All rights reserved. This information is not intended as a substitute for professional medical care. Always follow your healthcare professional's instructions.
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
## 2017-08-18 ENCOUNTER — HOSPITAL ENCOUNTER (EMERGENCY)
Facility: CLINIC | Age: 27
Discharge: HOME | End: 2017-08-18
Attending: EMERGENCY MEDICINE | Admitting: EMERGENCY MEDICINE
Payer: MEDICARE

## 2017-08-18 ENCOUNTER — APPOINTMENT (OUTPATIENT)
Dept: CT IMAGING | Facility: CLINIC | Age: 27
End: 2017-08-18
Attending: EMERGENCY MEDICINE
Payer: MEDICARE

## 2017-08-18 VITALS
DIASTOLIC BLOOD PRESSURE: 75 MMHG | SYSTOLIC BLOOD PRESSURE: 128 MMHG | TEMPERATURE: 98.4 F | BODY MASS INDEX: 43.43 KG/M2 | WEIGHT: 230 LBS | RESPIRATION RATE: 18 BRPM | OXYGEN SATURATION: 93 % | HEIGHT: 61 IN

## 2017-08-18 DIAGNOSIS — R07.9 ACUTE CHEST PAIN: ICD-10-CM

## 2017-08-18 DIAGNOSIS — R10.84 ABDOMINAL PAIN, GENERALIZED: ICD-10-CM

## 2017-08-18 LAB
ANION GAP SERPL CALCULATED.3IONS-SCNC: 7 MMOL/L (ref 3–14)
BASOPHILS # BLD AUTO: 0 10E9/L (ref 0–0.2)
BASOPHILS NFR BLD AUTO: 0.4 %
BUN SERPL-MCNC: 8 MG/DL (ref 7–30)
CALCIUM SERPL-MCNC: 9 MG/DL (ref 8.5–10.1)
CHLORIDE SERPL-SCNC: 103 MMOL/L (ref 94–109)
CO2 SERPL-SCNC: 29 MMOL/L (ref 20–32)
CREAT SERPL-MCNC: 0.52 MG/DL (ref 0.52–1.04)
D DIMER PPP FEU-MCNC: 2.2 UG/ML FEU (ref 0–0.5)
DIFFERENTIAL METHOD BLD: ABNORMAL
EOSINOPHIL # BLD AUTO: 0.6 10E9/L (ref 0–0.7)
EOSINOPHIL NFR BLD AUTO: 5.7 %
ERYTHROCYTE [DISTWIDTH] IN BLOOD BY AUTOMATED COUNT: 13.9 % (ref 10–15)
GFR SERPL CREATININE-BSD FRML MDRD: >90 ML/MIN/1.7M2
GLUCOSE SERPL-MCNC: 95 MG/DL (ref 70–99)
HCT VFR BLD AUTO: 35.6 % (ref 35–47)
HGB BLD-MCNC: 11.4 G/DL (ref 11.7–15.7)
IMM GRANULOCYTES # BLD: 0.1 10E9/L (ref 0–0.4)
IMM GRANULOCYTES NFR BLD: 0.7 %
LYMPHOCYTES # BLD AUTO: 1.3 10E9/L (ref 0.8–5.3)
LYMPHOCYTES NFR BLD AUTO: 11.9 %
MCH RBC QN AUTO: 29 PG (ref 26.5–33)
MCHC RBC AUTO-ENTMCNC: 32 G/DL (ref 31.5–36.5)
MCV RBC AUTO: 91 FL (ref 78–100)
MONOCYTES # BLD AUTO: 0.7 10E9/L (ref 0–1.3)
MONOCYTES NFR BLD AUTO: 6.2 %
NEUTROPHILS # BLD AUTO: 8.1 10E9/L (ref 1.6–8.3)
NEUTROPHILS NFR BLD AUTO: 75.1 %
NRBC # BLD AUTO: 0 10*3/UL
NRBC BLD AUTO-RTO: 0 /100
PLATELET # BLD AUTO: 289 10E9/L (ref 150–450)
POTASSIUM SERPL-SCNC: 4 MMOL/L (ref 3.4–5.3)
RBC # BLD AUTO: 3.93 10E12/L (ref 3.8–5.2)
SODIUM SERPL-SCNC: 139 MMOL/L (ref 133–144)
TROPONIN I SERPL-MCNC: <0.015 UG/L (ref 0–0.04)
WBC # BLD AUTO: 10.8 10E9/L (ref 4–11)

## 2017-08-18 PROCEDURE — 25000132 ZZH RX MED GY IP 250 OP 250 PS 637: Mod: GY | Performed by: EMERGENCY MEDICINE

## 2017-08-18 PROCEDURE — 99285 EMERGENCY DEPT VISIT HI MDM: CPT | Mod: 25

## 2017-08-18 PROCEDURE — 85379 FIBRIN DEGRADATION QUANT: CPT | Performed by: EMERGENCY MEDICINE

## 2017-08-18 PROCEDURE — A9270 NON-COVERED ITEM OR SERVICE: HCPCS | Mod: GY | Performed by: EMERGENCY MEDICINE

## 2017-08-18 PROCEDURE — 93005 ELECTROCARDIOGRAM TRACING: CPT

## 2017-08-18 PROCEDURE — 25000128 H RX IP 250 OP 636: Performed by: EMERGENCY MEDICINE

## 2017-08-18 PROCEDURE — 80048 BASIC METABOLIC PNL TOTAL CA: CPT | Performed by: EMERGENCY MEDICINE

## 2017-08-18 PROCEDURE — 84484 ASSAY OF TROPONIN QUANT: CPT | Performed by: EMERGENCY MEDICINE

## 2017-08-18 PROCEDURE — 85025 COMPLETE CBC W/AUTO DIFF WBC: CPT | Performed by: EMERGENCY MEDICINE

## 2017-08-18 PROCEDURE — 96374 THER/PROPH/DIAG INJ IV PUSH: CPT | Mod: 59

## 2017-08-18 PROCEDURE — 96361 HYDRATE IV INFUSION ADD-ON: CPT

## 2017-08-18 PROCEDURE — 71260 CT THORAX DX C+: CPT

## 2017-08-18 RX ORDER — IOPAMIDOL 755 MG/ML
500 INJECTION, SOLUTION INTRAVASCULAR ONCE
Status: COMPLETED | OUTPATIENT
Start: 2017-08-18 | End: 2017-08-18

## 2017-08-18 RX ORDER — OXYCODONE HYDROCHLORIDE 5 MG/1
5 TABLET ORAL ONCE
Status: COMPLETED | OUTPATIENT
Start: 2017-08-18 | End: 2017-08-18

## 2017-08-18 RX ORDER — OXYCODONE HYDROCHLORIDE 5 MG/1
5 TABLET ORAL EVERY 6 HOURS PRN
Qty: 8 TABLET | Refills: 0 | Status: SHIPPED | OUTPATIENT
Start: 2017-08-18 | End: 2017-09-19

## 2017-08-18 RX ORDER — LORAZEPAM 2 MG/ML
1 INJECTION INTRAMUSCULAR ONCE
Status: COMPLETED | OUTPATIENT
Start: 2017-08-18 | End: 2017-08-18

## 2017-08-18 RX ADMIN — SODIUM CHLORIDE 500 ML: 9 INJECTION, SOLUTION INTRAVENOUS at 13:12

## 2017-08-18 RX ADMIN — IOPAMIDOL 85 ML: 755 INJECTION, SOLUTION INTRAVENOUS at 14:47

## 2017-08-18 RX ADMIN — SODIUM CHLORIDE 100 ML: 9 INJECTION, SOLUTION INTRAVENOUS at 14:47

## 2017-08-18 RX ADMIN — LORAZEPAM 1 MG: 2 INJECTION INTRAMUSCULAR; INTRAVENOUS at 13:13

## 2017-08-18 RX ADMIN — OXYCODONE HYDROCHLORIDE 5 MG: 5 TABLET ORAL at 14:43

## 2017-08-18 ASSESSMENT — ENCOUNTER SYMPTOMS
FEVER: 0
ABDOMINAL PAIN: 0
SHORTNESS OF BREATH: 1
COUGH: 0

## 2017-08-18 NOTE — DISCHARGE INSTRUCTIONS
Please make an appointment to follow up with your primary care provider in 3-4 days if not improving.      Discharge Instructions  Chest Pain    You have been seen today for chest pain or discomfort.  At this time, your provider has found no signs that your chest pain is due to a serious or life-threatening condition, (or you have declined more testing and/or admission to the hospital). However, sometimes there is a serious problem that does not show up right away. Your evaluation today may not be complete and you may need further testing and evaluation.     Generally, every Emergency Department visit should have a follow-up clinic visit with either a primary or a specialty clinic/provider. Please follow-up as instructed by your emergency provider today.  Return to the Emergency Department if:    Your chest pain changes, gets worse, starts to happen more often, or comes with less activity.    You are newly short of breath.    You get very weak or tired.    You pass out or faint.    You have any new symptoms, like fever, cough, numb legs, or you cough up blood.    You have anything else that worries you.    Until you follow-up with your regular provider, please do the following:    Take one aspirin daily unless you have an allergy or are told not to by your provider.    If a stress test appointment has been made, go to the appointment.    If you have questions, contact your regular provider.    Follow-up with your regular provider/clinic as directed; this is very important.    If you were given a prescription for medicine here today, be sure to read all of the information (including the package insert) that comes with your prescription.  This will include important information about the medicine, its side effects, and any warnings that you need to know about.  The pharmacist who fills the prescription can provide more information and answer questions you may have about the medicine.  If you have questions or concerns  that the pharmacist cannot address, please call or return to the Emergency Department.       Remember that you can always come back to the Emergency Department if you are not able to see your regular provider in the amount of time listed above, if you get any new symptoms, or if there is anything that worries you.      Opioid Medication Information    You have been given a prescription for an opioid (narcotic) pain medicine and/or have received a pain medicine while here in the Emergency Department. These medicines can make you drowsy or impaired. You must not drive, operate dangerous equipment, or engage in any other dangerous activities while taking these medications. If you drive while taking these medications, you could be arrested for driving under the influence (DUI). Do not drink any alcohol while you are taking these medications.     Opioid pain medications can cause addiction. If you have a history of chemical dependency of any type, you are at a higher risk of becoming addicted to pain medications.  Only take these prescribed medications to treat your pain when all other options have been tried. Take it for as short a time and as few doses as possible. Store your pain pills in a secure place, as they are frequently stolen and provide a dangerous opportunity for children or visitors in your house to start abusing these powerful medications. We will not replace any lost or stolen medicine. If you do not finish your medication, please dispose of the remaining medication as recommended by your pharmacist.     The [place] [employer] has additional information on medication disposal: https://www.pca.state.[place]/living-green/managing-unwanted-medications.      Many prescription pain medications contain Tylenol  (acetaminophen), including Vicodin , Tylenol #3 , Norco , Lortab , and Percocet .  You should not take any extra pills of Tylenol  if you are using these prescription medications or you can  get very sick.  Do not ever take more than 3000 mg of acetaminophen in any 24 hour period.    All opioids tend to cause constipation. Drink plenty of water and eat foods that have a lot of fiber, such as fruits, vegetables, prune juice, apple juice and high fiber cereal.  Take a laxative if you don t move your bowels at least every other day. Miralax , Milk of Magnesia, Colace , or Senna  can be used to keep you regular.

## 2017-08-18 NOTE — ED AVS SNAPSHOT
[facility] Emergency Department    [address]    [facility] [address]    Phone:  [phone]    Fax:  [phone]                                       Nevin Alvarado   MRN: [id number]    Department:  [facility] Emergency Department   Date of Visit:  8/18/2017           After Visit Summary Signature Page     I have received my discharge instructions, and my questions have been answered. I have discussed any challenges I see with this plan with the nurse or doctor.    ..........................................................................................................................................  Patient/Patient Representative Signature      ..........................................................................................................................................  Patient Representative Print Name and Relationship to Patient    ..................................................               ................................................  Date                                            Time    ..........................................................................................................................................  Reviewed by Signature/Title    ...................................................              ..............................................  Date                                                            Time
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
## 2017-08-18 NOTE — ED AVS SNAPSHOT
[facility] Emergency Department    [address]    [facility] [address]    Phone:  [phone]    Fax:  [phone]                                       Nevin Alvarado   MRN: [id number]    Department:  [facility] Emergency Department   Date of Visit:  8/18/2017           Patient Information     Date Of Birth          1990        Your diagnoses for this visit were:     Acute chest pain     Abdominal pain, generalized        You were seen by Pierre Lee MD.        Discharge Instructions       Please make an appointment to follow up with your primary care provider in 3-4 days if not improving.      Discharge Instructions  Chest Pain    You have been seen today for chest pain or discomfort.  At this time, your provider has found no signs that your chest pain is due to a serious or life-threatening condition, (or you have declined more testing and/or admission to the hospital). However, sometimes there is a serious problem that does not show up right away. Your evaluation today may not be complete and you may need further testing and evaluation.     Generally, every Emergency Department visit should have a follow-up clinic visit with either a primary or a specialty clinic/provider. Please follow-up as instructed by your emergency provider today.  Return to the Emergency Department if:    Your chest pain changes, gets worse, starts to happen more often, or comes with less activity.    You are newly short of breath.    You get very weak or tired.    You pass out or faint.    You have any new symptoms, like fever, cough, numb legs, or you cough up blood.    You have anything else that worries you.    Until you follow-up with your regular provider, please do the following:    Take one aspirin daily unless you have an allergy or are told not to by your provider.    If a stress test appointment has been made, go to the appointment.    If you have questions,  contact your regular provider.    Follow-up with your regular provider/clinic as directed; this is very important.    If you were given a prescription for medicine here today, be sure to read all of the information (including the package insert) that comes with your prescription.  This will include important information about the medicine, its side effects, and any warnings that you need to know about.  The pharmacist who fills the prescription can provide more information and answer questions you may have about the medicine.  If you have questions or concerns that the pharmacist cannot address, please call or return to the Emergency Department.       Remember that you can always come back to the Emergency Department if you are not able to see your regular provider in the amount of time listed above, if you get any new symptoms, or if there is anything that worries you.      Opioid Medication Information    You have been given a prescription for an opioid (narcotic) pain medicine and/or have received a pain medicine while here in the Emergency Department. These medicines can make you drowsy or impaired. You must not drive, operate dangerous equipment, or engage in any other dangerous activities while taking these medications. If you drive while taking these medications, you could be arrested for driving under the influence (DUI). Do not drink any alcohol while you are taking these medications.     Opioid pain medications can cause addiction. If you have a history of chemical dependency of any type, you are at a higher risk of becoming addicted to pain medications.  Only take these prescribed medications to treat your pain when all other options have been tried. Take it for as short a time and as few doses as possible. Store your pain pills in a secure place, as they are frequently stolen and provide a dangerous opportunity for children or visitors in your house to start abusing these powerful medications. We will  not replace any lost or stolen medicine. If you do not finish your medication, please dispose of the remaining medication as recommended by your pharmacist.     The [place] [employer] has additional information on medication disposal: https://www.pca.[facility].[place].us/living-green/managing-unwanted-medications.      Many prescription pain medications contain Tylenol  (acetaminophen), including Vicodin , Tylenol #3 , Norco , Lortab , and Percocet .  You should not take any extra pills of Tylenol  if you are using these prescription medications or you can get very sick.  Do not ever take more than 3000 mg of acetaminophen in any 24 hour period.    All opioids tend to cause constipation. Drink plenty of water and eat foods that have a lot of fiber, such as fruits, vegetables, prune juice, apple juice and high fiber cereal.  Take a laxative if you don t move your bowels at least every other day. Miralax , Milk of Magnesia, Colace , or Senna  can be used to keep you regular.            24 Hour Appointment Hotline       To make an appointment at any [facility], call [phone] ([phone]). If you don't have a family doctor or clinic, we will help you find one. [facility] clinics are conveniently located to serve the needs of you and your family.             Review of your medicines      Our records show that you are taking the medicines listed below. If these are incorrect, please call your family doctor or clinic.        Dose / Directions Last dose taken    acetaminophen 500 MG tablet   Commonly known as:  TYLENOL   Dose:  500-1000 mg   Quantity:  90 tablet        Take 1-2 tablets (500-1,000 mg) by mouth every 8 hours as needed for mild pain   Refills:  3        docosanol 10 % Crea cream   Commonly known as:  ABREVA   Quantity:  2 g        Apply topically 5 times daily As needed   Refills:  3        ibuprofen 200 MG tablet   Commonly known as:  ADVIL/MOTRIN   Dose:  600 mg   Quantity:  30 tablet         Take 3 tablets (600 mg) by mouth every 8 hours as needed for mild pain   Refills:  0        LAMICTAL 100 MG tablet   Dose:  150 mg   Generic drug:  lamoTRIgine        Take 150 mg by mouth At Bedtime   Refills:  0        levonorgestrel 20 MCG/24HR IUD   Commonly known as:  MIRENA   Dose:  1 each        1 each (20 mcg) by Intrauterine route once for 1 dose   Refills:  0        meclizine 12.5 MG tablet   Commonly known as:  ANTIVERT   Dose:  25 mg   Quantity:  30 tablet        Take 2 tablets (25 mg) by mouth 4 times daily as needed for dizziness   Refills:  0        OLANZapine 5 MG tablet   Commonly known as:  zyPREXA   Quantity:  10 tablet        Take 1 tablet (5 mg) orally at bedtime prn   Refills:  0        ondansetron 4 MG tablet   Commonly known as:  ZOFRAN   Dose:  4 mg   Quantity:  10 tablet        Take 1 tablet (4 mg) by mouth every 8 hours as needed for nausea   Refills:  0        oxyCODONE 5 MG IR tablet   Commonly known as:  ROXICODONE   Dose:  5 mg   Quantity:  8 tablet        Take 1 tablet (5 mg) by mouth every 6 hours as needed for pain   Refills:  0        PRISTIQ PO   Dose:  100 mg        Take 100 mg by mouth every morning   Refills:  0        SUMATRIPTAN SUCCINATE PO   Dose:  50 mg        Take 50 mg by mouth once as needed for migraine Reported on 5/19/2017   Refills:  0        TOPAMAX 50 MG tablet   Dose:  25 mg   Generic drug:  topiramate        Take 25 mg by mouth At Bedtime   Refills:  0        VITAMIN D3 PO   Dose:  5000 Units        Take 5,000 Units by mouth every morning   Refills:  0                Prescriptions were sent or printed at these locations (1 Prescription)                   Other Prescriptions                Printed at Department/Unit printer (1 of 1)         oxyCODONE (ROXICODONE) 5 MG IR tablet                Procedures and tests performed during your visit     Basic metabolic panel    CBC with platelets differential    CT Chest Pulmonary Embolism w Contrast    D dimer  quantitative    EKG 12-lead, tracing only    Troponin I      Orders Needing Specimen Collection     None      Pending Results     No orders found from 8/16/2017 to 8/19/2017.            Pending Culture Results     No orders found from 8/16/2017 to 8/19/2017.            Pending Results Instructions     If you had any lab results that were not finalized at the time of your Discharge, you can call the ED Lab Result RN at [phone]. You will be contacted by this team for any positive Lab results or changes in treatment. The nurses are available 7 days a week from 10A to 6:30P.  You can leave a message 24 hours per day and they will return your call.        Test Results From Your Hospital Stay        8/18/2017  1:24 PM      Component Results     Component Value Ref Range & Units Status    WBC 10.8 4.0 - 11.0 10e9/L Final    RBC Count 3.93 3.8 - 5.2 10e12/L Final    Hemoglobin 11.4 (L) 11.7 - 15.7 g/dL Final    Hematocrit 35.6 35.0 - 47.0 % Final    MCV 91 78 - 100 fl Final    MCH 29.0 26.5 - 33.0 pg Final    MCHC 32.0 31.5 - 36.5 g/dL Final    RDW 13.9 10.0 - 15.0 % Final    Platelet Count 289 150 - 450 10e9/L Final    Diff Method Automated Method  Final    % Neutrophils 75.1 % Final    % Lymphocytes 11.9 % Final    % Monocytes 6.2 % Final    % Eosinophils 5.7 % Final    % Basophils 0.4 % Final    % Immature Granulocytes 0.7 % Final    Nucleated RBCs 0 0 /100 Final    Absolute Neutrophil 8.1 1.6 - 8.3 10e9/L Final    Absolute Lymphocytes 1.3 0.8 - 5.3 10e9/L Final    Absolute Monocytes 0.7 0.0 - 1.3 10e9/L Final    Absolute Eosinophils 0.6 0.0 - 0.7 10e9/L Final    Absolute Basophils 0.0 0.0 - 0.2 10e9/L Final    Abs Immature Granulocytes 0.1 0 - 0.4 10e9/L Final    Absolute Nucleated RBC 0.0  Final         8/18/2017  1:38 PM      Component Results     Component Value Ref Range & Units Status    D Dimer 2.2 (H) 0.0 - 0.50 ug/ml FEU Final    This D-dimer assay is intended for use in conjunction with a clinical pretest    probability assessment model to exclude pulmonary embolism (PE) and deep   venous thrombosis (DVT) in outpatients suspected of PE or DVT. The cut-off   value is 0.5 ug/mL FEU.           8/18/2017  1:44 PM      Component Results     Component Value Ref Range & Units Status    Sodium 139 133 - 144 mmol/L Final    Potassium 4.0 3.4 - 5.3 mmol/L Final    Chloride 103 94 - 109 mmol/L Final    Carbon Dioxide 29 20 - 32 mmol/L Final    Anion Gap 7 3 - 14 mmol/L Final    Glucose 95 70 - 99 mg/dL Final    Urea Nitrogen 8 7 - 30 mg/dL Final    Creatinine 0.52 0.52 - 1.04 mg/dL Final    GFR Estimate >90 >60 mL/min/1.7m2 Final    Non  GFR Calc    GFR Estimate If Black >90 >60 mL/min/1.7m2 Final    African American GFR Calc    Calcium 9.0 8.5 - 10.1 mg/dL Final         8/18/2017  3:06 PM      Narrative     Exam: CT CHEST PULMONARY EMBOLISM W CONTRAST 8/18/2017 2:59 PM    Comparison: 5/31/2014     Clinical History: Chest pain, concern for pulmonary embolus.    Technique: Volumetric helical acquisition of the chest were obtained  following pulmonary embolism protocol. Three-dimensional (3D)  post-processed angiographic images were reconstructed, archived to  PACS and used in interpretation of this study. Radiation dose for this  scan was reduced using automated exposure control, adjustment of the  mA and/or kV according to patient size, or iterative reconstruction  technique.    Contrast: 80mL Isovue-370    Findings:  Contrast bolus timing is adequate for evaluation for pulmonary emboli.  There is no evidence of pulmonary emboli.  There is no evidence of  right heart strain.     Mild dependent atelectasis. Lungs are otherwise clear. No evidence of  pleural effusion is noted.    Mild cardiomegaly is seen. Great vessels appear normal. No evidence of  axillary, mediastinal or hilar lymphadenopathy is seen.     Upper abdomen: Evaluation of the upper abdomen is limited by contrast  bolus timing and coverage.    Bones:  No concerning lytic or sclerotic lesions.        Impression     Impression:   1. No evidence of pulmonary embolus. Lungs are clear.  2. Mild cardiomegaly.    JANETT CARD MD         8/18/2017  2:15 PM      Component Results     Component Value Ref Range & Units Status    Troponin I ES <0.015 0.000 - 0.045 ug/L Final    The 99th percentile for upper reference range is 0.045 ug/L.  Troponin values   in the range of 0.045 - 0.120 ug/L may be associated with risks of adverse   clinical events.                  Clinical Quality Measure: Blood Pressure Screening     Your blood pressure was checked while you were in the emergency department today. The last reading we obtained was  BP: 128/85 . Please read the guidelines below about what these numbers mean and what you should do about them.  If your systolic blood pressure (the top number) is less than 120 and your diastolic blood pressure (the bottom number) is less than 80, then your blood pressure is normal. There is nothing more that you need to do about it.  If your systolic blood pressure (the top number) is 120-139 or your diastolic blood pressure (the bottom number) is 80-89, your blood pressure may be higher than it should be. You should have your blood pressure rechecked within a year by a primary care provider.  If your systolic blood pressure (the top number) is 140 or greater or your diastolic blood pressure (the bottom number) is 90 or greater, you may have high blood pressure. High blood pressure is treatable, but if left untreated over time it can put you at risk for heart attack, stroke, or kidney failure. You should have your blood pressure rechecked by a primary care provider within the next 4 weeks.  If your provider in the emergency department today gave you specific instructions to follow-up with your doctor or provider even sooner than that, you should follow that instruction and not wait for up to 4 weeks for your follow-up visit.        Thank you  for choosing [facility]       Thank you for choosing [facility] for your care. Our goal is always to provide you with excellent care. Hearing back from our patients is one way we can continue to improve our services. Please take a few minutes to complete the written survey that you may receive in the mail after you visit with us. Thank you!        [employer] Information     [email or web address] gives you secure access to your electronic health record. If you see a primary care provider, you can also send messages to your care team and make appointments. If you have questions, please call your primary care clinic.  If you do not have a primary care provider, please call [phone] and they will assist you.        Care EveryWhere ID     This is your Care EveryWhere ID. This could be used by other organizations to access your [facility] medical records  [id number]        Equal Access to Services     ZENON DIAMOND : Gabriel Collado, erick singh, taylor tenorio, mic persaud. So [facility] [phone].    ATENCIÓN: Si habla español, tiene a singh disposición servicios gratuitos de asistencia lingüística. Llame al [phone].    We comply with applicable federal civil rights laws and [place] laws. We do not discriminate on the basis of race, color, national origin, age, disability sex, sexual orientation or gender identity.            After Visit Summary       This is your record. Keep this with you and show to your community pharmacist(s) and doctor(s) at your next visit.

## 2017-08-18 NOTE — ED NOTES
"Patient arrives here for evaluation of SOB that started two days ago. She feels \"raspy and wheezy.\" She had abdominal surgery for foreign body removal of glass bottle on Monday. She is AOX4, ABC's intact.   "

## 2017-08-20 LAB — INTERPRETATION ECG - MUSE: NORMAL

## 2017-08-21 ENCOUNTER — TELEPHONE (OUTPATIENT)
Dept: INTERNAL MEDICINE | Facility: CLINIC | Age: 27
End: 2017-08-21

## 2017-08-21 ENCOUNTER — OFFICE VISIT (OUTPATIENT)
Dept: INTERNAL MEDICINE | Facility: CLINIC | Age: 27
End: 2017-08-21
Payer: MEDICARE

## 2017-08-21 VITALS
WEIGHT: 231.7 LBS | DIASTOLIC BLOOD PRESSURE: 76 MMHG | BODY MASS INDEX: 43.78 KG/M2 | TEMPERATURE: 98.4 F | SYSTOLIC BLOOD PRESSURE: 118 MMHG | HEART RATE: 82 BPM | OXYGEN SATURATION: 96 %

## 2017-08-21 DIAGNOSIS — Z09 HOSPITAL DISCHARGE FOLLOW-UP: Primary | ICD-10-CM

## 2017-08-21 DIAGNOSIS — T18.5XXD: ICD-10-CM

## 2017-08-21 PROCEDURE — 99214 OFFICE O/P EST MOD 30 MIN: CPT | Performed by: INTERNAL MEDICINE

## 2017-08-21 NOTE — TELEPHONE ENCOUNTER
Psychiatrist: Hillary Winkelmann  [phone]  She has not signed a consent for Dr. Ramos to call her and is wondering why she needs to discuss her care.  She would like Dr Ramos to call her mobile phone to discuss why needing to talk to psychiatrist.

## 2017-08-21 NOTE — PROGRESS NOTES
"SUBJECTIVE:      Nevin Alvarado is a pleasant 26 year old female who presents for hospital follow-up:    Hospital: [facility]  Date of Admission: 8/12/17  Date of Discharge: 8/16/17  Reason(s) for Admission: foreign body in the rectum    Diagnostic tests, treatments/interventions, and discharge summary reviewed.    PMH significant for PTSD, depression, and borderline personality disorder.     Summary of hospitalization:  - patient presented with rectal foreign body (glass perfume bottle) placed during sexual intercourse  - unable to be removed via digital rectal exam  - [facility] consulted  - patient underwent exploratory laparotomy, flexible sigmoidoscopy, and removal of rectal foreign body  - diet advance as tolerated  - pain well-controlled on oral pain medications  - discharged on POD#3    Medication changes since discharge: none  Adherent to discharge medications: yes  Problems taking discharge medications: no     Follow-up: seeing [facility] tomorrow for follow-up     Update since discharge:   - overall \"okay\"  - moderate incisional pain, but managing with Tylenol, ibuprofen, and oxycodone    - tolerating normal diet - no nausea or vomiting  - bowel movements normal - daily, soft, brown, formed, and easy to evacuate    - no fevers or chills  - no chest pain or palpitations  - no shortness of breath or cough    Patient's main concern is \"thrush:\"  - describes as entire tongue is involved  - very sensitive to cold and hot food and drink  - no recent oral or inhaled steroid use    Had kevin and long discussion with patient:  - her repeat ER visits and hospitalizations for self-injurious behavior (SEE BELOW) are VERY CONCERNING  - she is placing herself at significant risk with...   - multiple EGDs (11 since April, 2016)   - two EUAs + flex sigmoidoscopies since March, 2017   - three exploratory laparotomies since December, 2016   - four unintentional & intentional overdoses since October, 2016  - asked patient " if there is anyone who can monitor her behavior continuously, like a family member or guardian   - patient says that there is no such person (she says her mom would not be able to fulfill this role)  - currently lives independently in a group-home owned apartment without any monitoring (other than medications being allocated)    Discussed with patient that I will be discussing her case with our care coordinators because I am concerned that she is repeatedly placing herself at risk of injury and harm. She may benefit from a legal guardian and/or a more monitored living environment.     OBJECTIVE:       /76  Pulse 82  Temp 98.4  F (36.9  C) (Oral)  Wt 231 lb 11.2 oz (105.1 kg)  LMP 06/16/2017 (Approximate)  SpO2 96%  Breastfeeding? No  BMI 43.78 kg/m2  Constitutional: well-appearing  Respiratory: normal respiratory effort; clear to auscultation bilaterally  Cardiovascular: regular rate and rhythm; no edema  Gastrointestinal: soft, non-distended, and bowel sounds present; vertical incision c/d/i and healing appropriately  Musculoskeletal: normal gait and station; no clubbing or cyanosis  Psych: diminished judgment and insight; flattened affect    ASSESSMENT/PLAN:       (Z09) Hospital discharge follow-up  (primary encounter diagnosis)  (T18.5XXD) Rectal foreign body, subsequent encounter  (Z91.5) H/O self-harm  Comment:   - recovering appropriately from recent surgery.     - I am very concerned that the patient is placing herself at repeated, frequent, and high risk.    - suspect patient would be safer with close monitoring and/or a legal guardian to help her make better decisions.    - she reports following with a psychiatrist, but we do not have any records of this.   Plan:    - will attempt to reach out to patient's psychiatrist to discuss his/her thoughts re:/plans for patient.   - will place referral to care coordination to get involved as patient may qualify as a vulnerable adult.   - patient STRONGLY  URGED to avoid ingesting foreign objects or placing them in her rectum or vagina.    The instructions on the AVS were discussed and explained to the patient. Patient expressed understanding of instructions.    A total of 30 minutes were spent face-to-face with this patient during this encounter and over half of that time was spent on counseling and coordination of care re: above diagnoses and plans of care.     (Chart documentation was completed, in part, with [employer] voice-recognition software. Even though reviewed, some grammatical, spelling, and word errors may remain.)    Sandra Ramos MD   [address] [address]  T: [phone], F: [phone]    ER visits/hospitalizations (4/16- present) related to self harm only (multiple additional ER visits/hospitalizations)    4/8/16 - [facility] - ingestion of mickie pins, IV line, screws, plastic utensil; underwent 2 EGDs  4/13/16 - [facility] - swallowed two mickie pins; transferred to [facility]; underwent EGD  9/23/16 - [facility] - swallowed mickie pin(s?) - underwent EGD  10/17/16 - [facility] - unintentional overdose of Nyquil  11/1/16 - [facility]; swallowed spring and metal piece from a potato chip bag clip; underwent EGD  11/14/16 - [facility] - unintentional salicylate ingestion  11/25/16 - [facility] - swallowed copper wire and several mickie pins; underwent EGD  12/17/16 - [facility] - glass essential oil roller ball bottle per rectum; extracted manually in ER   12/18/16 - [facility] - flashlight per rectum while masturbating - underwent ex laparotomy and flex sigmoidoscopy  12/22/16 - [facility] - vibrator per rectum while having sex; extracted manually in ER  1/4/17 - [facility] - plastic paint bottle per rectum during intercourse; retrieved in ER   1/7/17 - [facility] - perfume bottle per rectum during intercourse; removed in ER  1/10/17 - [facility] - glass spray bottle per rectum during intercourse; underwent  ex laparotomy and flex sigmoidoscopy  3/7/17 - [facility] - glass cylinder per rectum while masturbating; required EUA and flex sigmoidoscopy to remove  3/9/17 - [facility] - swallowed 3 staples (inserted in hot dog); underwent EGD  3/12/17 - [facility] - glass bottle per rectum; extracted in ER   3/16/17 - [facility] - glass bottle per rectum - underwent EUA and flex sig  4/16/17 - [facility] - unintentional overdose of oxycodone  4/19/17 - [facility] - suicide attempt with OD of percocet  4/19/17 - [facility] - swallowed 2 mickie pins while in inpatient psych - underwent EGD  6/4/17 - [facility] - swallowed a mickie pin; no intervention  6/4/17 - [facility] - swallowed a mickie pin and broken metal clip; underwent EGD  6/8/17 - [facility] - swallowed 3 paperclips - underwent EGD  6/11/17 - [facility] swallowed pen spring while in inpatient psych - underwent EGD  7/8/17 - [facility] - retained tampon  8/12/17 - [facility] - perfume bottle per rectum during intercourse; underwent ex laparotomy and flex sigmoidoscopy

## 2017-08-21 NOTE — MR AVS SNAPSHOT
After Visit Summary   8/21/2017    Nevin Alvarado    MRN: [id number]           Patient Information     Date Of Birth          1990        Visit Information        Provider Department      8/21/2017 3:00 PM Sandra Ramos MD [facility]        Care Instructions    Recurrent foreign body insertions are very concerning and very serious.    This needs to stop.           Follow-ups after your visit        Who to contact     If you have questions or need follow up information about today's clinic visit or your schedule please contact [facility] directly at [phone].  Normal or non-critical lab and imaging results will be communicated to you by MyChart, letter or phone within 4 business days after the clinic has received the results. If you do not hear from us within 7 days, please contact the clinic through WaveCheckhart or phone. If you have a critical or abnormal lab result, we will notify you by phone as soon as possible.  Submit refill requests through FireScope or call your pharmacy and they will forward the refill request to us. Please allow 3 business days for your refill to be completed.          Additional Information About Your Visit        MyChart Information     FireScope gives you secure access to your electronic health record. If you see a primary care provider, you can also send messages to your care team and make appointments. If you have questions, please call your primary care clinic.  If you do not have a primary care provider, please call [phone] and they will assist you.        Care EveryWhere ID     This is your Care EveryWhere ID. This could be used by other organizations to access your [facility] medical records  [id number]        Your Vitals Were     Pulse Temperature Last Period Pulse Oximetry Breastfeeding? BMI (Body Mass Index)    82 98.4  F (36.9  C) (Oral) 06/16/2017 (Approximate) 96% No 43.78 kg/m2       Blood  Pressure from Last 3 Encounters:   08/21/17 118/76   08/18/17 128/75   08/16/17 145/80    Weight from Last 3 Encounters:   08/21/17 231 lb 11.2 oz (105.1 kg)   08/18/17 230 lb (104.3 kg)   08/16/17 230 lb (104.3 kg)              Today, you had the following     No orders found for display       Primary Care Provider Office Phone # Fax #    Sandra Ramos [phone]       [address] [facility] [address]        Equal Access to Services     [facility]: Hadii aad ku hadasho Soomaali, waaxda luqadaha, qaybta kaalmada adeegyada, mic angelo . So [facility] [phone].    ATENCIÓN: Si habla español, tiene a singh disposición servicios gratuitos de asistencia lingüística. [facility] [phone].    We comply with applicable federal civil rights laws and [place] laws. We do not discriminate on the basis of race, color, national origin, age, disability sex, sexual orientation or gender identity.            Thank you!     Thank you for choosing [facility]  for your care. Our goal is always to provide you with excellent care. Hearing back from our patients is one way we can continue to improve our services. Please take a few minutes to complete the written survey that you may receive in the mail after your visit with us. Thank you!             Your Updated Medication List - Protect others around you: Learn how to safely use, store and throw away your medicines at www.disposemymeds.org.          This list is accurate as of: 8/21/17  3:20 PM.  Always use your most recent med list.                   Brand Name Dispense Instructions for use Diagnosis    acetaminophen 500 MG tablet    TYLENOL    90 tablet    Take 1-2 tablets (500-1,000 mg) by mouth every 8 hours as needed for mild pain    Moderate pain       LAMICTAL 100 MG tablet   Generic drug:  lamoTRIgine      Take 150 mg by mouth At Bedtime        levonorgestrel 20 MCG/24HR IUD    MIRENA     1 each (20  mcg) by Intrauterine route once for 1 dose        oxyCODONE 5 MG IR tablet    ROXICODONE    8 tablet    Take 1 tablet (5 mg) by mouth every 6 hours as needed for pain        PRISTIQ PO      Take 100 mg by mouth every morning        VITAMIN D3 PO      Take 5,000 Units by mouth every morning

## 2017-08-21 NOTE — NURSING NOTE
"Chief Complaint   Patient presents with     Hospital F/U     Foreign body removal at [facility] on 08/13/17 - was in hospital from 08/13/17-08/16/17      Mouth/Lip Problem     symptoms since ~ 08/17/18 - tongue feels like it's burning and tongue hurts        Initial /76  Pulse 82  Temp 98.4  F (36.9  C) (Oral)  Wt 105.1 kg (231 lb 11.2 oz)  LMP 06/16/2017 (Approximate)  SpO2 96%  Breastfeeding? No  BMI 43.78 kg/m2 Estimated body mass index is 43.78 kg/(m^2) as calculated from the following:    Height as of 8/18/17: 1.549 m (5' 1\").    Weight as of this encounter: 105.1 kg (231 lb 11.2 oz).  Medication Reconciliation: complete    "

## 2017-08-21 NOTE — TELEPHONE ENCOUNTER
Called and left message to call back.   Per Dr Ramos, we need to know:  1. Who is her psychiatrist and their contact information?  2. Has she signed a consent form for Dr Ramos to call her psychiatrist to discuss her care?  JAVIER Rios LPN

## 2017-08-22 ENCOUNTER — TRANSFERRED RECORDS (OUTPATIENT)
Dept: HEALTH INFORMATION MANAGEMENT | Facility: CLINIC | Age: 27
End: 2017-08-22

## 2017-08-23 ENCOUNTER — TRANSFERRED RECORDS (OUTPATIENT)
Dept: HEALTH INFORMATION MANAGEMENT | Facility: CLINIC | Age: 27
End: 2017-08-23

## 2017-08-24 ENCOUNTER — CARE COORDINATION (OUTPATIENT)
Dept: CARE COORDINATION | Facility: CLINIC | Age: 27
End: 2017-08-24

## 2017-08-24 ENCOUNTER — TRANSFERRED RECORDS (OUTPATIENT)
Dept: HEALTH INFORMATION MANAGEMENT | Facility: CLINIC | Age: 27
End: 2017-08-24

## 2017-08-24 NOTE — LETTER
Health Care Home - Access Care Plan    About Me  Patient Name:  Nevin Tong    YOB: 1990  Age:                            26 year old   Ruchi MRN:         [id number] Telephone Information:     Home Phone [phone]   Mobile [phone]       Address:    [address]   [place] [address] Email address:  [email or web address]      Emergency Contact(s)  Name Relationship Lgl Grd Work Phone Home Phone Mobile Phone   Katherine TONG, A* Mother  none [phone]             Health Maintenance: Routine Health maintenance Reviewed: Due/Overdue   Health Maintenance Due   Topic Date Due     MIGRAINE ACTION PLAN  11/10/2009     PAP SCREENING Q3 YR (SYSTEM ASSIGNED)  11/10/2012        My Access Plan  Medical Emergency 911   Questions or concerns during clinic hours Primary Clinic Line, I will call the clinic directly: Primary Clinic: [facility] [phone]   24 Hour Appointment Line [phone] or  [phone])  (toll free)   24 Hour Nurse Line [phone] (toll free)   Questions or concerns outside clinic hours 24 Hour Appointment Line, I will call the after-hours on-call line:   [facility] [phone] or [phone]) (toll-free)   Preferred Urgent Care [place] [facility] [phone]   Preferred Hospital Preferred Hospital: [facility]  [phone]   Preferred Pharmacy [facility] [place] [address]     Behavioral Health Crisis Line The National Suicide Prevention Lifeline at 1-518.860.3636 or 911     My Care Team Members  Patient Care Team       Relationship Specialty Notifications Start End    Sandra Ramos MD PCP - General Internal Medicine  1/4/17     Phone: [phone] Fax: [phone]         [address] [facility] [address]    Yajaira López    8/24/17      Comment:  [employer], [facility]     Phone: [phone]         Valencia Puentes    8/24/17     Comment:  CAD[employer] worker with [employer]    Phone: [phone]         Nevin Patterson Clinic Care Coordinator  - Clinical Admissions 8/24/17     Phone: [phone]         Loni Hill Psychiatrist   8/24/17     Comment:  [facility]    Phone: [phone]         Lizz Norman Therapist   8/24/17     Comment:  [facility]

## 2017-08-24 NOTE — LETTER
August 24, 2017      Nevin Alvarado  [address]   [place] [address]        Dear Nevin ,    I am the Social Work Care Coordinator that works with your primary care provider's clinic. I wanted to thank you for spending the time to talk with me.  Below is a description of what Clinic Care Coordination is and how I can further assist you.     My role as the care coordinator  is to help you manage your health and improve access to the [facility] system in the most efficient manner.I can assist you with community resources (Housing, transportation, employment, financial resources, health insurance), behavioral and psychosocial needs, chemical dependency and counseling/psychiatric services.    I am a free and available resource Monday through Friday here at the clinic. Feel free to save my contact information and outreach to me anytime at my direct number  [phone]. We at [facility] are focused on providing you with the highest-quality healthcare experience possible. I look forward to working with you.    Sincerely,    Nevin Patterson, ALLAN, MSW  Social Work Care Coordinator  P:[phone]  [facility] [place]    Enclosed: I have enclosed a copy of a 24 Hour Access Plan. This has helpful phone numbers for you to call when needed. Please keep this in an easy to access place to use as needed.

## 2017-08-25 ENCOUNTER — TRANSFERRED RECORDS (OUTPATIENT)
Dept: HEALTH INFORMATION MANAGEMENT | Facility: CLINIC | Age: 27
End: 2017-08-25

## 2017-08-27 ENCOUNTER — NURSE TRIAGE (OUTPATIENT)
Dept: NURSING | Facility: CLINIC | Age: 27
End: 2017-08-27

## 2017-08-28 NOTE — TELEPHONE ENCOUNTER
"  Reason for Disposition    Dressing soaked with blood or body fluid (e.g., drainage)    Additional Information    Negative: [1] Major abdominal surgical incision AND [2] wound gaping open AND [3] visible internal organs    Negative: Sounds like a life-threatening emergency to the triager    Negative: [1] Bleeding from incision AND [2] won't stop after 10 minutes of direct pressure    Negative: [1] Widespread rash AND [2] bright red, sunburn-like    Negative: Severe pain in the incision    Negative: [1] Suture came out early AND [2] wound gaping AND [3] < 48 hours since sutures placed    Negative: [1] Incision gaping open AND [2] length of opening > 2 inches (5 cm)    Negative: Patient sounds very sick or weak to the triager    Negative: Sounds like a serious complication to the triager    Negative: Fever > 100.5 F (38.1 C)    Negative: [1] Incision looks infected (spreading redness, pain) AND [2] fever > 99.5 F (37.5 C)    Negative: [1] Incision looks infected (spreading redness, pain) AND [2] large red area (> 2 in. or 5 cm)    Negative: [1] Incision looks infected (spreading redness, pain) AND [2] face wound    Negative: [1] Red streak runs from the incision AND [2] longer than 1 inch (2.5 cm)    Negative: [1] Pus or bad-smelling fluid draining from incision AND [2] no fever    Negative: [1] Post-op pain AND [2] not controlled with pain medications    Answer Assessment - Initial Assessment Questions  1. SYMPTOM: \"What's the main symptom you're concerned about?\" (e.g., redness, pain, drainage)      bleeding  2. ONSET: \"When did ________  start?\"      Within past 30 minutes  3. SURGERY: \"What surgery was performed?\"      8/12/17 foreign body in rectum  4. DATE of SURGERY: \"When was surgery performed?\"       8/12/17  5. INCISION SITE: \"Where is the incision located?\"       abdomen  6. REDNESS: \"Is there any redness at the incision site?\" If yes, ask: \"How wide across is the redness?\" (Inches, centimeters)       " "no  7. PAIN: \"Is there any pain?\" If so, ask: \"How bad is it?\"  (Scale 1-10; or mild, moderate, severe)      no  8. BLEEDING: \"Is there any bleeding?\" If so, ask: \"How much?\" and \"Where?\"      30 cc blood  9. DRAINAGE: \"Is there any drainage from the incision site?\" If yes, ask: \"What color and how much?\" (e.g., red, cloudy, pus; drops, teaspoon)      n/a  10. FEVER: \"Do you have a fever?\" If so, ask: \"What is your temperature, how was it measured, and when did it start?\"        n/a  11. OTHER SYMPTOMS: \"Do you have any other symptoms?\" (e.g., shaking chills, weakness, rash elsewhere on body)        N/a  Pt was taking a shower as directed by her provider who works at [facility] and she has a 4\" abdominal incision that she is dry packing.  The incision started to bleed after the shower she applied light pressure for 10 minutes and the bleeding is now an oozing.  She is not lightheaded or faint.  Instructed her to apply steady firm pressure for 10 solid minutes and if bleeding does not stop to go to ER.    Protocols used: POST-OP INCISION SYMPTOMS-ADULT-AH    "

## 2017-08-28 NOTE — PROGRESS NOTES
CCSW received a release for the clinic to speak to her therapist and psychiatrist at [facility] [place].     The form is correctly filled out and gives the [facility] permission to speak with her care team and [facility].     CCSW faxed the release to [facility] in [place] ([phone]) and sent the original copy to medical records for abstracting.    CCSW will also route a note to the provider.    Nevin Patterson, Social Work Care Coordinator, LGSW, MSW  [facility]: [facility] and [place]  P:[phone]/ Signed August 28, 2017

## 2017-09-01 ENCOUNTER — CARE COORDINATION (OUTPATIENT)
Dept: CARE COORDINATION | Facility: CLINIC | Age: 27
End: 2017-09-01

## 2017-09-01 ENCOUNTER — NURSE TRIAGE (OUTPATIENT)
Dept: NURSING | Facility: CLINIC | Age: 27
End: 2017-09-01

## 2017-09-01 NOTE — PROGRESS NOTES
Clinic Care Coordination Contact  Care Team Conversations    Called the patient    Confirmed this CCSw received her JOAQUIM and has abstracted it to the patient's chart.     Patient did not go to the TCU. She has been able to treat her open incision at home with her RN. Feels it has improved.   Reports no concerns, no questions, and no need for on going care coordination.     CCSW will not continue to reach out but will remain on  The care team and outreach in the future if further ED visits arise.    Nevin Patterson, Social Work Care Coordinator, [facility], MSW  [facility] and [place]  P:[phone]/ Signed September 1, 2017

## 2017-09-01 NOTE — TELEPHONE ENCOUNTER
Additional Information    Negative: [1] Sudden onset of rash (within last 2 hours) AND [2] difficulty with breathing or swallowing    Negative: Sounds like a life-threatening emergency to the triager    Negative: Poison ivy, oak, or sumac contact suspected    Negative: Insect bite(s) suspected    Negative: Ringworm suspected (i.e., round pink patch, sometimes looks like ring, usually 1/2 to 1 inch [12-25 mm],  in size, slowly increasing in size)    Negative: Athlete's Foot suspected (i.e., itchy rash between the toes)    Negative: Jock Itch suspected (i.e., itchy rash on inner thighs near genital area)    Negative: Wound infection suspected (i.e., pain, spreading redness, or pus; in a cut, puncture, scrape or sutured wound)    Negative: Impetigo suspected  (i.e., painless infected superficial small sores, less than 1 inch or 2.5 cm, often covered by a soft, yellow-brown scab or crust; sometimes occurring near nasal openings)    Negative: Shingles suspected (i.e., painful rash, multiple small blisters grouped together in one area of body; dermatomal distribution)    Negative: Rash of external female genital area (vulva)    Negative: Rash of penis or scrotum    Negative: Small spot, skin growth, or mole    Negative: Sores or skin ulcer, not a rash    Negative: Localized lump (or swelling) without redness or rash    Negative: [1] Localized purple or blood-colored spots or dots AND [2] not from injury or friction AND [3] fever    Negative: Patient sounds very sick or weak to the triager    Negative: [1] Red area or streak AND [2] fever    Negative: [1] Rash is painful to touch AND [2] fever    Negative: [1] Looks infected (spreading redness, pus) AND [2] large red area (> 2 in. or 5 cm)    Negative: [1] Looks infected (spreading redness, pus) AND [2] diabetes mellitus or weak immune system (e.g., HIV positive,  cancer chemotherapy, chronic steroid treatment, splenectomy)    Negative: [1] Localized purple or  "blood-colored spots or dots AND [2] not from injury or friction AND [3] no fever    Negative: [1] Looks infected (spreading redness, pus) AND [2] no fever    Negative: Looks like a boil, infected sore, deep ulcer or other infected rash    Negative: [1] Localized rash is very painful AND [2] no fever    Negative: Genital area rash    Negative: Lyme disease suspected (e.g., bull's eye rash or tick bite / exposure)    Negative: [1] Applying cream or ointment AND [2] causes severe itch, burning or pain    Negative: [1] Pimples (localized) AND [2 ] NO improvement after using Care Advice    Negative: Tender bumps in armpits    Negative: [1] Severe localized itching AND [2] after 2 days of steroid cream and antihistamines    Negative: Localized rash present > 7 days    Negative: Red, moist, irritated area between skin folds (or under larger breasts)    Mild localized rash (all triage questions negative)    Answer Assessment - Initial Assessment Questions  1. APPEARANCE of RASH: \"Describe the rash.\"       Small bumps  2. LOCATION: \"Where is the rash located?\"       Lower legs, tops of feet  3. NUMBER: \"How many spots are there?\"       several  4. SIZE: \"How big are the spots?\" (Inches, centimeters or compare to size of a coin)       small  5. ONSET: \"When did the rash start?\"       Woke up with it  6. ITCHING: \"Does the rash itch?\" If so, ask: \"How bad is the itch?\"  (Scale 1-10; or mild, moderate, severe)      moderate  7. PAIN: \"Does the rash hurt?\" If so, ask: \"How bad is the pain?\"  (Scale 1-10; or mild, moderate, severe)      no  8. OTHER SYMPTOMS: \"Do you have any other symptoms?\" (e.g., fever)      no  9. PREGNANCY: \"Is there any chance you are pregnant?\" \"When was your last menstrual period?\"      no    Protocols used: RASH OR REDNESS - LOCALIZED-ADULT-AH    "

## 2017-09-13 DIAGNOSIS — Z53.9 ERRONEOUS ENCOUNTER--DISREGARD: Primary | ICD-10-CM

## 2017-09-14 ENCOUNTER — TRANSFERRED RECORDS (OUTPATIENT)
Dept: HEALTH INFORMATION MANAGEMENT | Facility: CLINIC | Age: 27
End: 2017-09-14

## 2017-09-18 ENCOUNTER — CARE COORDINATION (OUTPATIENT)
Dept: CARE COORDINATION | Facility: CLINIC | Age: 27
End: 2017-09-18

## 2017-09-18 NOTE — PROGRESS NOTES
Clinic Care Coordination Contact  Care Team Conversations    CCSW received a 64 page fax from [facility] with all of the patient's records.     The [facility] did not specific a fax to send the information too so it was sent to this CCSW rather than the clinic.     CCSW will abstract to medical records to be added to the chart.    Nevin Patterson, Social Work Care Coordinator, [facility], MSW  [facility]: [facility] and [place]  P:[phone]/ Signed September 18, 2017

## 2017-09-19 ENCOUNTER — TELEPHONE (OUTPATIENT)
Dept: INTERNAL MEDICINE | Facility: CLINIC | Age: 27
End: 2017-09-19

## 2017-09-19 ENCOUNTER — HOSPITAL ENCOUNTER (EMERGENCY)
Facility: CLINIC | Age: 27
Discharge: HOME | End: 2017-09-19
Attending: EMERGENCY MEDICINE | Admitting: EMERGENCY MEDICINE
Payer: MEDICARE

## 2017-09-19 ENCOUNTER — APPOINTMENT (OUTPATIENT)
Dept: CT IMAGING | Facility: CLINIC | Age: 27
End: 2017-09-19
Attending: EMERGENCY MEDICINE
Payer: MEDICARE

## 2017-09-19 VITALS
RESPIRATION RATE: 16 BRPM | OXYGEN SATURATION: 98 % | SYSTOLIC BLOOD PRESSURE: 132 MMHG | TEMPERATURE: 99.8 F | HEART RATE: 90 BPM | DIASTOLIC BLOOD PRESSURE: 77 MMHG

## 2017-09-19 DIAGNOSIS — R10.84 ABDOMINAL PAIN, GENERALIZED: ICD-10-CM

## 2017-09-19 LAB
ALBUMIN SERPL-MCNC: 3.7 G/DL (ref 3.4–5)
ALBUMIN UR-MCNC: NEGATIVE MG/DL
ALP SERPL-CCNC: 87 U/L (ref 40–150)
ALT SERPL W P-5'-P-CCNC: 21 U/L (ref 0–50)
ANION GAP SERPL CALCULATED.3IONS-SCNC: 7 MMOL/L (ref 3–14)
APPEARANCE UR: CLEAR
AST SERPL W P-5'-P-CCNC: 10 U/L (ref 0–45)
BACTERIA #/AREA URNS HPF: ABNORMAL /HPF
BASOPHILS # BLD AUTO: 0 10E9/L (ref 0–0.2)
BASOPHILS NFR BLD AUTO: 0.4 %
BILIRUB SERPL-MCNC: 0.2 MG/DL (ref 0.2–1.3)
BILIRUB UR QL STRIP: NEGATIVE
BUN SERPL-MCNC: 15 MG/DL (ref 7–30)
CALCIUM SERPL-MCNC: 9.1 MG/DL (ref 8.5–10.1)
CHLORIDE SERPL-SCNC: 105 MMOL/L (ref 94–109)
CO2 SERPL-SCNC: 27 MMOL/L (ref 20–32)
COLOR UR AUTO: YELLOW
CREAT SERPL-MCNC: 0.55 MG/DL (ref 0.52–1.04)
DIFFERENTIAL METHOD BLD: NORMAL
EOSINOPHIL # BLD AUTO: 0.3 10E9/L (ref 0–0.7)
EOSINOPHIL NFR BLD AUTO: 2.9 %
ERYTHROCYTE [DISTWIDTH] IN BLOOD BY AUTOMATED COUNT: 13.5 % (ref 10–15)
GFR SERPL CREATININE-BSD FRML MDRD: >90 ML/MIN/1.7M2
GLUCOSE SERPL-MCNC: 107 MG/DL (ref 70–99)
GLUCOSE UR STRIP-MCNC: NEGATIVE MG/DL
HCG UR QL: NEGATIVE
HCT VFR BLD AUTO: 36.3 % (ref 35–47)
HGB BLD-MCNC: 11.7 G/DL (ref 11.7–15.7)
HGB UR QL STRIP: ABNORMAL
IMM GRANULOCYTES # BLD: 0 10E9/L (ref 0–0.4)
IMM GRANULOCYTES NFR BLD: 0.2 %
KETONES UR STRIP-MCNC: NEGATIVE MG/DL
LEUKOCYTE ESTERASE UR QL STRIP: NEGATIVE
LIPASE SERPL-CCNC: 173 U/L (ref 73–393)
LYMPHOCYTES # BLD AUTO: 1.8 10E9/L (ref 0.8–5.3)
LYMPHOCYTES NFR BLD AUTO: 18.8 %
MCH RBC QN AUTO: 28.9 PG (ref 26.5–33)
MCHC RBC AUTO-ENTMCNC: 32.2 G/DL (ref 31.5–36.5)
MCV RBC AUTO: 90 FL (ref 78–100)
MONOCYTES # BLD AUTO: 0.7 10E9/L (ref 0–1.3)
MONOCYTES NFR BLD AUTO: 6.8 %
NEUTROPHILS # BLD AUTO: 6.7 10E9/L (ref 1.6–8.3)
NEUTROPHILS NFR BLD AUTO: 70.9 %
NITRATE UR QL: NEGATIVE
NRBC # BLD AUTO: 0 10*3/UL
NRBC BLD AUTO-RTO: 0 /100
PH UR STRIP: 5 PH (ref 5–7)
PLATELET # BLD AUTO: 306 10E9/L (ref 150–450)
POTASSIUM SERPL-SCNC: 3.8 MMOL/L (ref 3.4–5.3)
PROT SERPL-MCNC: 7.4 G/DL (ref 6.8–8.8)
RBC # BLD AUTO: 4.05 10E12/L (ref 3.8–5.2)
RBC #/AREA URNS AUTO: <1 /HPF (ref 0–2)
SODIUM SERPL-SCNC: 139 MMOL/L (ref 133–144)
SOURCE: ABNORMAL
SP GR UR STRIP: 1.01 (ref 1–1.03)
SQUAMOUS #/AREA URNS AUTO: 1 /HPF (ref 0–1)
UROBILINOGEN UR STRIP-MCNC: 0 MG/DL (ref 0–2)
WBC # BLD AUTO: 9.5 10E9/L (ref 4–11)
WBC #/AREA URNS AUTO: 1 /HPF (ref 0–2)

## 2017-09-19 PROCEDURE — 80053 COMPREHEN METABOLIC PANEL: CPT | Performed by: EMERGENCY MEDICINE

## 2017-09-19 PROCEDURE — 81025 URINE PREGNANCY TEST: CPT | Performed by: EMERGENCY MEDICINE

## 2017-09-19 PROCEDURE — 74176 CT ABD & PELVIS W/O CONTRAST: CPT

## 2017-09-19 PROCEDURE — 83690 ASSAY OF LIPASE: CPT | Performed by: EMERGENCY MEDICINE

## 2017-09-19 PROCEDURE — 85025 COMPLETE CBC W/AUTO DIFF WBC: CPT | Performed by: EMERGENCY MEDICINE

## 2017-09-19 PROCEDURE — 99284 EMERGENCY DEPT VISIT MOD MDM: CPT | Mod: 25

## 2017-09-19 PROCEDURE — 81001 URINALYSIS AUTO W/SCOPE: CPT | Performed by: EMERGENCY MEDICINE

## 2017-09-19 ASSESSMENT — ENCOUNTER SYMPTOMS
FREQUENCY: 0
ABDOMINAL PAIN: 1
DIARRHEA: 1
BLOOD IN STOOL: 0
DYSURIA: 0

## 2017-09-19 NOTE — ED NOTES
Pain in right side of abd, recent surgery at [facility] for foreign body removal pt reports she had a glass bottle removed from colon. Pain increased this am she felt nauseous. Pt appears anxious on arrival. VSS. Last took ibuprofen 1100, tylenol 1500. A/ox 3. Meds up to date.

## 2017-09-19 NOTE — ED AVS SNAPSHOT
[facility] Emergency Department    [address]    [facility] [address]    Phone:  [phone]    Fax:  [phone]                                       Nevin Alvarado   MRN: [id number]    Department:  [facility] Emergency Department   Date of Visit:  9/19/2017           After Visit Summary Signature Page     I have received my discharge instructions, and my questions have been answered. I have discussed any challenges I see with this plan with the nurse or doctor.    ..........................................................................................................................................  Patient/Patient Representative Signature      ..........................................................................................................................................  Patient Representative Print Name and Relationship to Patient    ..................................................               ................................................  Date                                            Time    ..........................................................................................................................................  Reviewed by Signature/Title    ...................................................              ..............................................  Date                                                            Time

## 2017-09-19 NOTE — ED PROVIDER NOTES
"  History     Chief Complaint:  Abdominal Injury      HPI   Nevin Alvarado is a 26 year old female who presents to the emergency department today for evaluation of abdominal injury. The patient reports having abdominal pain for about a week and a half. When sitting up she states it \"feels like its tearing\" and when laying down it \"feels like a norma horse.\" She states this feels different than her chronic abdominal pain. She also complains of some mild intermittent diarrhea. She denies bloody stool or urinary symptoms.    Allergies:  Augmentin  Blood-Group Specific Substance  Hydrocodone  Influenza Vaccines  Oseltamivir  Vicodin [Hydrocodone-Acetaminophen]  Cefazolin  Cephalosporins     Medications:    lamoTRIgine (LAMICTAL) 100 MG tablet  levonorgestrel (MIRENA) 20 MCG/24HR IUD  Desvenlafaxine Succinate (PRISTIQ PO)    Past Medical History:    ADD  Anorexia nervosa with bulimia  Anxiety  Borderline personality disorder  Depression  Self-harm  Swallowed foreign body  Lives in independent group home  Migraine without aura  Morbid obesity  PTSD  Rectal foreign body    Past Surgical History:    EGD  Exam under anesthesia anus  Remove fecal impaction or FB w anesthesia  Knee surgery  Laparoscopic ablation endometriosis  Laparotomy exploratory  Lymph nodes removed from neck; benign  Mammoplasty reduction  Release carpal tunnel  Sigmoidoscopy flexible    Family History:    Cerebrovascular disease    Social History:  The patient was unaccompanied to the ED.  Smoking Status: Never  Smokeless Tobacco: Never  Alcohol Use: No  Marital Status:  Single     Review of Systems   Gastrointestinal: Positive for abdominal pain and diarrhea. Negative for blood in stool.   Genitourinary: Negative for dysuria and frequency.   All other systems reviewed and are negative.    Physical Exam   First Vitals:  BP: (!) 148/93  Pulse: 90  Temp: 99.8  F (37.7  C)  Resp: 16  SpO2: 97 %      Physical Exam  Constitutional: Patient is well " appearing. No distress.  Head: Atraumatic.  Mouth/Throat: Oropharynx is clear and moist. No oropharyngeal exudate.  Eyes: Conjunctivae and EOM are normal. No scleral icterus.  Neck: Normal range of motion. Neck supple.   Cardiovascular: Normal rate, regular rhythm, normal heart sounds and intact distal pulses.   Pulmonary/Chest: Breath sounds normal. No respiratory distress.  Abdominal: Soft. Bowel sounds are normal. No distension. No tenderness. No rebound or guarding. Midline abdominal incision, very well healing, clean dry intact, no abscess or fluctuance.  Musculoskeletal: Normal range of motion. No edema or tenderness.   Neurological: Alert and orientated to person, place, and time. No observable focal neuro deficit  Skin: Warm and dry. No rash noted. Not diaphoretic.     Emergency Department Course     Imaging:  Radiology findings were communicated with the patient who voiced understanding of the findings.  CT Abdomen Pelvis w/o contrast  IMPRESSION: Unremarkable unenhanced CT of the abdomen and pelvis.  Specifically, no foreign body is seen within the gastrointestinal  Tract.  Report per radiology     Laboratory:  Laboratory findings were communicated with the patient who voiced understanding of the findings.  UA with Microscopic: urine blood moderate, bacteria few, o/w WNL.  HCG Qualitative Urine: negative   CBC: AWNL. (WBC 9.5, HGB 11.7, )   CMP: Glucose 107 (H) o/w WNL (Creatinine 0.55)  Lipase: 173    Emergency Department Course:  Nursing notes and vitals reviewed.  IV was inserted and blood was drawn for laboratory testing, results above.  The patient provided a urine sample here in the emergency department. This was sent for laboratory testing, findings above.  The patient was sent for a CT abdomen pelvis w/o contrast while in the emergency department, results above.   1859: I performed an exam of the patient as documented above.   I discussed the treatment plan with the patient. They expressed  understanding of this plan and consented to discharge. They will be discharged home with instructions for care and follow up. In addition, the patient will return to the emergency department if their symptoms persist, worsen, if new symptoms arise or if there is any concern.  All questions were answered.  I personally reviewed the laboratory and imaging results with the Patient and answered all related questions prior to discharge.    Impression & Plan      Medical Decision Making:  Please review EMR longstanding hx of multi mental health and chronic abdominal issues.  Imaging and workup reassuring.    All questions and concerns were answered. The patient was discharged home and recommended to follow up with her primary physician and return with any new or worsening symptoms.     Diagnosis:    ICD-10-CM    1. Abdominal pain, generalized R10.84        Disposition:  Discharged to home    Scribe Disclosure:  I, Jennifer Shawcharlie, am serving as a scribe at 6:41 PM on 9/19/2017 to document services personally performed by Dejon Aldana MD based on my observations and the provider's statements to me.   9/19/2017   [facility] EMERGENCY DEPARTMENT       Dejon Aldana MD  09/19/17 [id number]

## 2017-09-19 NOTE — ED AVS SNAPSHOT
[facility] Emergency Department    [address]    [facility] [address]    Phone:  [phone]    Fax:  [phone]                                       Nevin Alvarado   MRN: [id number]    Department:  [facility] Emergency Department   Date of Visit:  9/19/2017           Patient Information     Date Of Birth          1990        Your diagnoses for this visit were:     Abdominal pain, generalized        You were seen by Dejon Aldana MD.      Follow-up Information     Follow up with Sandra Ramos MD. Schedule an appointment as soon as possible for a visit in 1 day.    Specialty:  Internal Medicine    Why:  As needed, If symptoms worsen    Contact information:    [address] [facility] [address]  [phone]          Discharge Instructions         Abdominal Pain    Abdominal pain is pain in the stomach or belly area. Everyone has this pain from time to time. In many cases it goes away on its own. But abdominal pain can sometimes be due to a serious problem, such as appendicitis. So it s important to know when to seek help.  Causes of abdominal pain  There are many possible causes of abdominal pain. Common causes in adults include:    Constipation, diarrhea, or gas    Stomach acid flowing back up into the esophagus (acid reflux or heartburn)    Severe acid reflux, called GERD (gastroesophageal reflux disease)    A sore in the lining of the stomach or small intestine (peptic ulcer)    Inflammation of the gallbladder, liver, or pancreas    Gallstones or kidney stones    Appendicitis     Intestinal blockage     An internal organ pushing through a muscle or other tissue (hernia)    Urinary tract infections    In women, menstrual cramps, fibroids, or endometriosis    Inflammation or infection of the intestines  Diagnosing the cause of abdominal pain  Your healthcare provider will do a physical exam help find the cause of your pain. If needed, tests will  be ordered. Belly pain has many possible causes. So it can be hard to find the reason for your pain. Giving details about your pain can help. Tell your provider where and when you feel the pain, and what makes it better or worse. Also let your provider know if you have other symptoms such as:    Fever    Tiredness    Upset stomach (nausea)    Vomiting    Changes in bathroom habits  Treating abdominal pain  Some causes of pain need emergency medical treatment right away. These include appendicitis or a bowel blockage. Other problems can be treated with rest, fluids, or medicines. Your healthcare provider can give you specific instructions for treatment or self-care based on what is causing your pain.  If you have vomiting or diarrhea, sip water or other clear fluids. When you are ready to eat solid foods again, start with small amounts of easy-to-digest, low-fat foods. These include apple sauce, toast, or crackers.   When to seek medical care  Call 911 or go to the hospital right away if you:    Can t pass stool and are vomiting    Are vomiting blood or have bloody diarrhea or black, tarry diarrhea    Have chest, neck, or shoulder pain    Feel like you might pass out    Have pain in your shoulder blades with nausea    Have sudden, severe belly pain    Have new, severe pain unlike any you have felt before    Have a belly that is rigid, hard, and tender to touch  Call your healthcare provider if you have:    Pain for more than 5 days    Bloating for more than 2 days    Diarrhea for more than 5 days    A fever of 100.4 F (38.0 C) or higher, or as directed by your provider    Pain that gets worse    Weight loss for no reason    Continued lack of appetite    Blood in your stool  How to prevent abdominal pain  Here are some tips to help prevent abdominal pain:    Eat smaller amounts of food at one time.    Avoid greasy, fried, or other high-fat foods.    Avoid foods that give you gas.    Exercise regularly.    Drink plenty  of fluids.  To help prevent GERD symptoms:    Quit smoking.    Reduce alcohol and certain foods that increase stomach acid.    Avoid aspirin and over-the-counter pain and fever medicines (NSAIDS or nonsteroidal anti-inflammatory drugs), if possible    Lose extra weight.    Finish eating at least 2 hours before you go to bed or lie down.    Raise the head of your bed.  Date Last Reviewed: 7/1/2016 2000-2017 [employer]. [address] [place] [address], [place] [address]. All rights reserved. This information is not intended as a substitute for professional medical care. Always follow your healthcare professional's instructions.          24 Hour Appointment Hotline       To make an appointment at any [facility], call [phone] ([phone]). If you don't have a family doctor or clinic, we will help you find one. [facility] are conveniently located to serve the needs of you and your family.             Review of your medicines      Our records show that you are taking the medicines listed below. If these are incorrect, please call your family doctor or clinic.        Dose / Directions Last dose taken    acetaminophen 500 MG tablet   Commonly known as:  TYLENOL   Dose:  500-1000 mg   Quantity:  90 tablet        Take 1-2 tablets (500-1,000 mg) by mouth every 8 hours as needed for mild pain   Refills:  3        IBUPROFEN PO   Dose:  600 mg        Take 600 mg by mouth   Refills:  0        LAMICTAL 100 MG tablet   Dose:  150 mg   Generic drug:  lamoTRIgine        Take 150 mg by mouth At Bedtime   Refills:  0        levonorgestrel 20 MCG/24HR IUD   Commonly known as:  MIRENA   Dose:  1 each        1 each (20 mcg) by Intrauterine route once for 1 dose   Refills:  0        PRISTIQ PO   Dose:  100 mg        Take 100 mg by mouth every morning   Refills:  0        VITAMIN D3 PO   Dose:  5000 Units        Take 5,000 Units by mouth every morning   Refills:  0                Procedures and tests performed  during your visit     CBC with platelets differential    CT Abdomen Pelvis w/o Contrast    Comprehensive metabolic panel    HCG qualitative urine    Lipase    UA with Microscopic      Orders Needing Specimen Collection     None      Pending Results     Date and Time Order Name Status Description    9/19/2017 1843 CT Abdomen Pelvis w/o Contrast Preliminary             Pending Culture Results     No orders found from 9/17/2017 to 9/20/2017.            Pending Results Instructions     If you had any lab results that were not finalized at the time of your Discharge, you can call the ED Lab Result RN at [phone]. You will be contacted by this team for any positive Lab results or changes in treatment. The nurses are available 7 days a week from 10A to 6:30P.  You can leave a message 24 hours per day and they will return your call.        Test Results From Your Hospital Stay        9/19/2017  7:52 PM      Component Results     Component Value Ref Range & Units Status    Color Urine Yellow  Final    Appearance Urine Clear  Final    Glucose Urine Negative NEG^Negative mg/dL Final    Bilirubin Urine Negative NEG^Negative Final    Ketones Urine Negative NEG^Negative mg/dL Final    Specific Gravity Urine 1.015 1.003 - 1.035 Final    Blood Urine Moderate (A) NEG^Negative Final    pH Urine 5.0 5.0 - 7.0 pH Final    Protein Albumin Urine Negative NEG^Negative mg/dL Final    Urobilinogen mg/dL 0.0 0.0 - 2.0 mg/dL Final    Nitrite Urine Negative NEG^Negative Final    Leukocyte Esterase Urine Negative NEG^Negative Final    Source Midstream Urine  Final    WBC Urine 1 0 - 2 /HPF Final    RBC Urine <1 0 - 2 /HPF Final    Bacteria Urine Few (A) NEG^Negative /HPF Final    Squamous Epithelial /HPF Urine 1 0 - 1 /HPF Final         9/19/2017  7:52 PM      Component Results     Component Value Ref Range & Units Status    HCG Qual Urine Negative NEG^Negative Final    This test is for screening purposes.  Results should be interpreted  along with   the clinical picture.  Confirmation testing is available if warranted by   ordering [id number], HCG Quantitative Pregnancy.           9/19/2017  7:33 PM      Component Results     Component Value Ref Range & Units Status    WBC 9.5 4.0 - 11.0 10e9/L Final    RBC Count 4.05 3.8 - 5.2 10e12/L Final    Hemoglobin 11.7 11.7 - 15.7 g/dL Final    Hematocrit 36.3 35.0 - 47.0 % Final    MCV 90 78 - 100 fl Final    MCH 28.9 26.5 - 33.0 pg Final    MCHC 32.2 31.5 - 36.5 g/dL Final    RDW 13.5 10.0 - 15.0 % Final    Platelet Count 306 150 - 450 10e9/L Final    Diff Method Automated Method  Final    % Neutrophils 70.9 % Final    % Lymphocytes 18.8 % Final    % Monocytes 6.8 % Final    % Eosinophils 2.9 % Final    % Basophils 0.4 % Final    % Immature Granulocytes 0.2 % Final    Nucleated RBCs 0 0 /100 Final    Absolute Neutrophil 6.7 1.6 - 8.3 10e9/L Final    Absolute Lymphocytes 1.8 0.8 - 5.3 10e9/L Final    Absolute Monocytes 0.7 0.0 - 1.3 10e9/L Final    Absolute Eosinophils 0.3 0.0 - 0.7 10e9/L Final    Absolute Basophils 0.0 0.0 - 0.2 10e9/L Final    Abs Immature Granulocytes 0.0 0 - 0.4 10e9/L Final    Absolute Nucleated RBC 0.0  Final         9/19/2017  8:00 PM      Component Results     Component Value Ref Range & Units Status    Sodium 139 133 - 144 mmol/L Final    Potassium 3.8 3.4 - 5.3 mmol/L Final    Chloride 105 94 - 109 mmol/L Final    Carbon Dioxide 27 20 - 32 mmol/L Final    Anion Gap 7 3 - 14 mmol/L Final    Glucose 107 (H) 70 - 99 mg/dL Final    Urea Nitrogen 15 7 - 30 mg/dL Final    Creatinine 0.55 0.52 - 1.04 mg/dL Final    GFR Estimate >90 >60 mL/min/1.7m2 Final    Non  GFR Calc    GFR Estimate If Black >90 >60 mL/min/1.7m2 Final    African American GFR Calc    Calcium 9.1 8.5 - 10.1 mg/dL Final    Bilirubin Total 0.2 0.2 - 1.3 mg/dL Final    Albumin 3.7 3.4 - 5.0 g/dL Final    Protein Total 7.4 6.8 - 8.8 g/dL Final    Alkaline Phosphatase 87 40 - 150 U/L Final    ALT 21 0 - 50 U/L  Final    AST 10 0 - 45 U/L Final         9/19/2017  8:00 PM      Component Results     Component Value Ref Range & Units Status    Lipase 173 73 - 393 U/L Final         9/19/2017  7:49 PM      Narrative     CT ABDOMEN AND PELVIS WITHOUT CONTRAST  9/19/2017 7:40 PM    HISTORY: Abdominal pain, multiple history of foreign body rectum and  ingestion.    COMPARISON: A CT on 7/11/2017.    TECHNIQUE: Routine transverse CT imaging of the abdomen and pelvis was  performed without contrast. Radiation dose for this scan was reduced  using automated exposure control, adjustment of the mA and/or kV  according to patient size, or iterative reconstruction technique.    FINDINGS: The visualized lung bases are clear. The liver, spleen,  pancreas, gallbladder, adrenal glands, kidneys, and bladder are  normal. No enlarged lymph node or other abnormal mass is demonstrated.  No free fluid is seen. No free intraperitoneal gas is identified. The  gastrointestinal tract is unremarkable. No radiopaque foreign body is  seen within the gastrointestinal tract. The appendix is not  identified. There is no additional evidence of appendicitis. An  intrauterine device is seen within the uterus. No vascular abnormality  is seen. The osseous structures are unremarkable. No abdominal or  pelvic wall pathology is demonstrated.         Impression     IMPRESSION: Unremarkable unenhanced CT of the abdomen and pelvis.  Specifically, no foreign body is seen within the gastrointestinal  tract.                Clinical Quality Measure: Blood Pressure Screening     Your blood pressure was checked while you were in the emergency department today. The last reading we obtained was  BP: (!) 148/93 . Please read the guidelines below about what these numbers mean and what you should do about them.  If your systolic blood pressure (the top number) is less than 120 and your diastolic blood pressure (the bottom number) is less than 80, then your blood pressure is  normal. There is nothing more that you need to do about it.  If your systolic blood pressure (the top number) is 120-139 or your diastolic blood pressure (the bottom number) is 80-89, your blood pressure may be higher than it should be. You should have your blood pressure rechecked within a year by a primary care provider.  If your systolic blood pressure (the top number) is 140 or greater or your diastolic blood pressure (the bottom number) is 90 or greater, you may have high blood pressure. High blood pressure is treatable, but if left untreated over time it can put you at risk for heart attack, stroke, or kidney failure. You should have your blood pressure rechecked by a primary care provider within the next 4 weeks.  If your provider in the emergency department today gave you specific instructions to follow-up with your doctor or provider even sooner than that, you should follow that instruction and not wait for up to 4 weeks for your follow-up visit.        Thank you for choosing [facility]       Thank you for choosing [facility] for your care. Our goal is always to provide you with excellent care. Hearing back from our patients is one way we can continue to improve our services. Please take a few minutes to complete the written survey that you may receive in the mail after you visit with us. Thank you!        [employer] Information     [employer] gives you secure access to your electronic health record. If you see a primary care provider, you can also send messages to your care team and make appointments. If you have questions, please call your primary care clinic.  If you do not have a primary care provider, please call [phone] and they will assist you.        Care EveryWhere ID     This is your Care EveryWhere ID. This could be used by other organizations to access your [facility] medical records  [id number]        Equal Access to Services     ZENON DIAMOND AH: erick Craig qaybta  mic flores ah. So [facility] [phone].    ATENCIÓN: Si habla español, tiene a singh disposición servicios gratuitos de asistencia lingüística. Llame al [phone].    We comply with applicable federal civil rights laws and [place] laws. We do not discriminate on the basis of race, color, national origin, age, disability sex, sexual orientation or gender identity.            After Visit Summary       This is your record. Keep this with you and show to your community pharmacist(s) and doctor(s) at your next visit.

## 2017-09-20 NOTE — DISCHARGE INSTRUCTIONS
Abdominal Pain    Abdominal pain is pain in the stomach or belly area. Everyone has this pain from time to time. In many cases it goes away on its own. But abdominal pain can sometimes be due to a serious problem, such as appendicitis. So it s important to know when to seek help.  Causes of abdominal pain  There are many possible causes of abdominal pain. Common causes in adults include:    Constipation, diarrhea, or gas    Stomach acid flowing back up into the esophagus (acid reflux or heartburn)    Severe acid reflux, called GERD (gastroesophageal reflux disease)    A sore in the lining of the stomach or small intestine (peptic ulcer)    Inflammation of the gallbladder, liver, or pancreas    Gallstones or kidney stones    Appendicitis     Intestinal blockage     An internal organ pushing through a muscle or other tissue (hernia)    Urinary tract infections    In women, menstrual cramps, fibroids, or endometriosis    Inflammation or infection of the intestines  Diagnosing the cause of abdominal pain  Your healthcare provider will do a physical exam help find the cause of your pain. If needed, tests will be ordered. Belly pain has many possible causes. So it can be hard to find the reason for your pain. Giving details about your pain can help. Tell your provider where and when you feel the pain, and what makes it better or worse. Also let your provider know if you have other symptoms such as:    Fever    Tiredness    Upset stomach (nausea)    Vomiting    Changes in bathroom habits  Treating abdominal pain  Some causes of pain need emergency medical treatment right away. These include appendicitis or a bowel blockage. Other problems can be treated with rest, fluids, or medicines. Your healthcare provider can give you specific instructions for treatment or self-care based on what is causing your pain.  If you have vomiting or diarrhea, sip water or other clear fluids. When you are ready to eat solid foods again,  start with small amounts of easy-to-digest, low-fat foods. These include apple sauce, toast, or crackers.   When to seek medical care  Call 911 or go to the hospital right away if you:    Can t pass stool and are vomiting    Are vomiting blood or have bloody diarrhea or black, tarry diarrhea    Have chest, neck, or shoulder pain    Feel like you might pass out    Have pain in your shoulder blades with nausea    Have sudden, severe belly pain    Have new, severe pain unlike any you have felt before    Have a belly that is rigid, hard, and tender to touch  Call your healthcare provider if you have:    Pain for more than 5 days    Bloating for more than 2 days    Diarrhea for more than 5 days    A fever of 100.4 F (38.0 C) or higher, or as directed by your provider    Pain that gets worse    Weight loss for no reason    Continued lack of appetite    Blood in your stool  How to prevent abdominal pain  Here are some tips to help prevent abdominal pain:    Eat smaller amounts of food at one time.    Avoid greasy, fried, or other high-fat foods.    Avoid foods that give you gas.    Exercise regularly.    Drink plenty of fluids.  To help prevent GERD symptoms:    Quit smoking.    Reduce alcohol and certain foods that increase stomach acid.    Avoid aspirin and over-the-counter pain and fever medicines (NSAIDS or nonsteroidal anti-inflammatory drugs), if possible    Lose extra weight.    Finish eating at least 2 hours before you go to bed or lie down.    Raise the head of your bed.  Date Last Reviewed: 7/1/2016 2000-2017 [employer]. [address] [place] [address], [place] [address]. All rights reserved. This information is not intended as a substitute for professional medical care. Always follow your healthcare professional's instructions.

## 2017-09-26 ENCOUNTER — OFFICE VISIT (OUTPATIENT)
Dept: INTERNAL MEDICINE | Facility: CLINIC | Age: 27
End: 2017-09-26
Payer: MEDICARE

## 2017-09-26 VITALS
SYSTOLIC BLOOD PRESSURE: 118 MMHG | BODY MASS INDEX: 44.5 KG/M2 | HEART RATE: 78 BPM | HEIGHT: 61 IN | TEMPERATURE: 98.6 F | DIASTOLIC BLOOD PRESSURE: 80 MMHG | WEIGHT: 235.7 LBS | OXYGEN SATURATION: 97 %

## 2017-09-26 DIAGNOSIS — N89.8 VAGINAL ITCHING: ICD-10-CM

## 2017-09-26 DIAGNOSIS — Z23 NEED FOR PROPHYLACTIC VACCINATION AND INOCULATION AGAINST INFLUENZA: ICD-10-CM

## 2017-09-26 DIAGNOSIS — R10.31 RIGHT LOWER QUADRANT PAIN: Primary | ICD-10-CM

## 2017-09-26 DIAGNOSIS — N80.9 ENDOMETRIOSIS: ICD-10-CM

## 2017-09-26 LAB
SPECIMEN SOURCE: NORMAL
WET PREP SPEC: NORMAL

## 2017-09-26 PROCEDURE — 87491 CHLMYD TRACH DNA AMP PROBE: CPT | Performed by: INTERNAL MEDICINE

## 2017-09-26 PROCEDURE — 90686 IIV4 VACC NO PRSV 0.5 ML IM: CPT | Performed by: INTERNAL MEDICINE

## 2017-09-26 PROCEDURE — 99214 OFFICE O/P EST MOD 30 MIN: CPT | Mod: 25 | Performed by: INTERNAL MEDICINE

## 2017-09-26 PROCEDURE — G0008 ADMIN INFLUENZA VIRUS VAC: HCPCS | Performed by: INTERNAL MEDICINE

## 2017-09-26 PROCEDURE — 87210 SMEAR WET MOUNT SALINE/INK: CPT | Performed by: INTERNAL MEDICINE

## 2017-09-26 PROCEDURE — 87591 N.GONORRHOEAE DNA AMP PROB: CPT | Performed by: INTERNAL MEDICINE

## 2017-09-26 RX ORDER — MAGNESIUM CARB/ALUMINUM HYDROX 105-160MG
296 TABLET,CHEWABLE ORAL ONCE
Qty: 296 ML | Refills: 0 | Status: SHIPPED | OUTPATIENT
Start: 2017-09-26 | End: 2017-09-26

## 2017-09-26 NOTE — MR AVS SNAPSHOT
After Visit Summary   9/26/2017    Nevin Alvarado    MRN: [id number]           Patient Information     Date Of Birth          1990        Visit Information        Provider Department      9/26/2017 1:45 PM Sandra Ramos MD [facility]        Today's Diagnoses     Vaginal itching    -  1    Right lower quadrant pain        Endometriosis          Care Instructions    Vaginal swabs - will send for processing.    ---    Trial of magnesium citrate.    ---    Discuss oral contraceptive for treatment of endometriosis with psychiatrist (may interfere with Lamictal).          Follow-ups after your visit        Who to contact     If you have questions or need follow up information about today's clinic visit or your schedule please contact [facility] directly at [phone].  Normal or non-critical lab and imaging results will be communicated to you by MyChart, letter or phone within 4 business days after the clinic has received the results. If you do not hear from us within 7 days, please contact the clinic through [employer] or phone. If you have a critical or abnormal lab result, we will notify you by phone as soon as possible.  Submit refill requests through [employer] or call your pharmacy and they will forward the refill request to us. Please allow 3 business days for your refill to be completed.          Additional Information About Your Visit        MyChart Information     [employer] gives you secure access to your electronic health record. If you see a primary care provider, you can also send messages to your care team and make appointments. If you have questions, please call your primary care clinic.  If you do not have a primary care provider, please call [phone] and they will assist you.        Care EveryWhere ID     This is your Care EveryWhere ID. This could be used by other organizations to access your [facility]  "records  [id number]        Your Vitals Were     Pulse Temperature Height Pulse Oximetry BMI (Body Mass Index)       78 98.6  F (37  C) (Oral) 5' 1\" (1.549 m) 97% 44.54 kg/m2        Blood Pressure from Last 3 Encounters:   09/26/17 118/80   09/19/17 132/77   08/21/17 118/76    Weight from Last 3 Encounters:   09/26/17 235 lb 11.2 oz (106.9 kg)   08/21/17 231 lb 11.2 oz (105.1 kg)   08/18/17 230 lb (104.3 kg)              We Performed the Following     CHLAMYDIA TRACHOMATIS PCR     NEISSERIA GONORRHOEA PCR     Wet prep          Today's Medication Changes          These changes are accurate as of: 9/26/17  2:15 PM.  If you have any questions, ask your nurse or doctor.               Start taking these medicines.        Dose/Directions    magnesium citrate 1.745 GM/30ML solution   Used for:  Right lower quadrant pain   Started by:  Sandra Ramos MD        Dose:  296 mL   Take 296 mLs by mouth once for 1 dose   Quantity:  296 mL   Refills:  0            Where to get your medicines      These medications were [place] [facility] [place] [address], [place] [address]     Phone:  [phone]     magnesium citrate 1.745 GM/30ML solution                Primary Care Provider Office Phone # Fax #    Sandra Ramos [phone]       [address] [place] [address]        Equal Access to Services     ZENON DIAMOND AH: Hadii zaire ku hadasho Soomaali, waaxda luqadaha, qaybta kaalmada adeegyada, waxay jose rafael angelo . So [facility] [phone].    ATENCIÓN: Si dedrickla ashtyn, tiene a singh disposición servicios gratuitos de asistencia lingüística. Llame al [phone].    We comply with applicable federal civil rights laws and [place] laws. We do not discriminate on the basis of race, color, national origin, age, disability sex, sexual orientation or gender identity.            Thank you!     Thank you for choosing [facility] " [facility]  for your care. Our goal is always to provide you with excellent care. Hearing back from our patients is one way we can continue to improve our services. Please take a few minutes to complete the written survey that you may receive in the mail after your visit with us. Thank you!             Your Updated Medication List - Protect others around you: Learn how to safely use, store and throw away your medicines at www.disposemymeds.org.          This list is accurate as of: 9/26/17  2:15 PM.  Always use your most recent med list.                   Brand Name Dispense Instructions for use Diagnosis    acetaminophen 500 MG tablet    TYLENOL    90 tablet    Take 1-2 tablets (500-1,000 mg) by mouth every 8 hours as needed for mild pain    Moderate pain       IBUPROFEN PO      Take 600 mg by mouth        LAMICTAL 100 MG tablet   Generic drug:  lamoTRIgine      Take 150 mg by mouth At Bedtime        levonorgestrel 20 MCG/24HR IUD    MIRENA     1 each (20 mcg) by Intrauterine route once for 1 dose        magnesium citrate 1.745 GM/30ML solution     296 mL    Take 296 mLs by mouth once for 1 dose    Right lower quadrant pain       PRISTIQ PO      Take 100 mg by mouth every morning        VITAMIN D3 PO      Take 5,000 Units by mouth every morning

## 2017-09-26 NOTE — PATIENT INSTRUCTIONS
Vaginal swabs - will send for processing.    ---    Trial of magnesium citrate.    ---    Discuss oral contraceptive for treatment of endometriosis with psychiatrist (may interfere with Lamictal).

## 2017-09-26 NOTE — PROGRESS NOTES
"  SUBJECTIVE:                                                      HPI: Nevin Alvarado is a pleasant 26 year old female who presents with multiple concerns:    1. Abdominal pain:  - right lower quadrant  - ongoing for ~1 month (started ~1 week after abdominal surgery - see OV note from 8/21/17)  - both sharp and dull in quality  - moderate to severe in severity  - always present  - worse with bending over, lifting right leg, and sitting down  - better with rest    - no fevers or chills  - no nausea or vomiting  - no diarrhea, constipation, melena, or hematochezia; stools are regular with daily Miralax    Went to ER 9/19/17 for above symptoms.    CT abdomen generally normal, but significant stool noted ascending colon.     Labs within normal limits.     2. Vaginal itching:  - ongoing for ~3 days  - no abnormal vaginal discharge  + foul smell    - no urinary symptoms  - no pelvic pain  - no fever or chills    Does not use any vaginal products or over clean area.     Has not been sexually active since early August. No known STD exposure.     3. Endometriosis:  - s/p laparoscopy with fulguration of lesions in the past  - struggling with pain during periods   - has a Mirena IUD in place for contraception  - has a remote history of nausea with OCPs, but is open to retrying    PMH significant for migraine WITHOUT aura.     PMH also significant for depression, anxiety, PTSD, and BPD - on Lamictal.     Never smoker.     ---    She is also due for a flu shot.     ---    The medication, allergy, and problem lists have been reviewed and updated as appropriate.       OBJECTIVE:                                                      /80 (BP Location: Left arm, Patient Position: Chair, Cuff Size: Adult Large)  Pulse 78  Temp 98.6  F (37  C) (Oral)  Ht 5' 1\" (1.549 m)  Wt 235 lb 11.2 oz (106.9 kg)  SpO2 97%  BMI 44.54 kg/m2  Constitutional: well-appearing  Gastrointestinal: soft, non-distended, and bowel sounds " present; tender throughout, especially RLQ, no rebound; no organomegaly or masses; midline vertical incision healing well - no erythema, induration, fluctuance, drainage, or discharge   Genitourinary: external genitalia, urethral meatus, and vagina normal; no abnormal vaginal discharge    ASSESSMENT/PLAN:                                                      (R10.31) Right lower quadrant pain  (primary encounter diagnosis)  Comment:    - recent generally negative imaging and labs.    - reviewed CT images with patient - significant stool in ascending colon - may be contributing.    - pain may also be due to scar tissue from recent surgeries (two abdominal surgeries this year already).   Plan:    - TRIAL of magnesium citrate to fully clear colon of stool.   - after that recommend warm packs, Tylenol, and ibuprofen as needed for relief.   - if continued pain, consider pain referral for further evaluation and management.     (L29.8) Vaginal itching  Comment: normal exam.   Plan:    - swabs for wet prep and GC/C.   - if negative, can consider trial of topical low potency steroid.     (N80.9) Endometriosis  Comment. recommend re-trial of OCP, but OCP may interfere with Lamictal.   Plan: patient to discuss OCP initiation with psychiatrist.     (Z23) Need for prophylactic vaccination and inoculation against influenza  Plan: flu shot given today.     The instructions on the AVS were discussed and explained to the patient. Patient expressed understanding of instructions.    (Chart documentation was completed, in part, with [employer] voice-recognition software. Even though reviewed, some grammatical, spelling, and word errors may remain.)    Sandra Ramos MD   [address] [address]  T: [phone], F: [phone]

## 2017-09-26 NOTE — NURSING NOTE
"Chief Complaint   Patient presents with     Abdominal Pain     At the Incision site, since the abdominal surgery on 8/13/17     Vaginal Problem     x 3 days. Vaginal itching and smells bad.       Initial /80 (BP Location: Left arm, Patient Position: Chair, Cuff Size: Adult Large)  Pulse 78  Temp 98.6  F (37  C) (Oral)  Ht 5' 1\" (1.549 m)  Wt 235 lb 11.2 oz (106.9 kg)  SpO2 97%  BMI 44.54 kg/m2 Estimated body mass index is 44.54 kg/(m^2) as calculated from the following:    Height as of this encounter: 5' 1\" (1.549 m).    Weight as of this encounter: 235 lb 11.2 oz (106.9 kg).  Medication Reconciliation: complete     Kaminibose MA      "

## 2017-09-27 ENCOUNTER — NURSE TRIAGE (OUTPATIENT)
Dept: NURSING | Facility: CLINIC | Age: 27
End: 2017-09-27

## 2017-09-27 ENCOUNTER — HOSPITAL ENCOUNTER (EMERGENCY)
Facility: CLINIC | Age: 27
Discharge: HOME | End: 2017-09-27
Attending: EMERGENCY MEDICINE | Admitting: EMERGENCY MEDICINE
Payer: MEDICARE

## 2017-09-27 ENCOUNTER — APPOINTMENT (OUTPATIENT)
Dept: GENERAL RADIOLOGY | Facility: CLINIC | Age: 27
End: 2017-09-27
Attending: EMERGENCY MEDICINE
Payer: MEDICARE

## 2017-09-27 VITALS
HEART RATE: 81 BPM | OXYGEN SATURATION: 99 % | SYSTOLIC BLOOD PRESSURE: 132 MMHG | DIASTOLIC BLOOD PRESSURE: 71 MMHG | TEMPERATURE: 98.1 F | RESPIRATION RATE: 18 BRPM

## 2017-09-27 DIAGNOSIS — K59.00 CONSTIPATION, UNSPECIFIED CONSTIPATION TYPE: ICD-10-CM

## 2017-09-27 LAB
C TRACH DNA SPEC QL NAA+PROBE: NEGATIVE
N GONORRHOEA DNA SPEC QL NAA+PROBE: NEGATIVE
SPECIMEN SOURCE: NORMAL
SPECIMEN SOURCE: NORMAL

## 2017-09-27 PROCEDURE — 99283 EMERGENCY DEPT VISIT LOW MDM: CPT

## 2017-09-27 PROCEDURE — 25000132 ZZH RX MED GY IP 250 OP 250 PS 637: Mod: GY | Performed by: EMERGENCY MEDICINE

## 2017-09-27 PROCEDURE — 74020 XR ABDOMEN 2 VW: CPT

## 2017-09-27 PROCEDURE — A9270 NON-COVERED ITEM OR SERVICE: HCPCS | Mod: GY | Performed by: EMERGENCY MEDICINE

## 2017-09-27 RX ADMIN — DOCUSATE SODIUM 286 ML: 50 LIQUID ORAL at 23:11

## 2017-09-27 ASSESSMENT — ENCOUNTER SYMPTOMS
VOMITING: 0
NAUSEA: 1
LIGHT-HEADEDNESS: 1
ABDOMINAL PAIN: 1
CONSTIPATION: 1

## 2017-09-27 NOTE — ED AVS SNAPSHOT
[facility] Emergency Department    [address]    [facility] [address]    Phone:  [phone]    Fax:  [phone]                                       Nevin Alvarado   MRN: [id number]    Department:  [facility] Emergency Department   Date of Visit:  9/27/2017           After Visit Summary Signature Page     I have received my discharge instructions, and my questions have been answered. I have discussed any challenges I see with this plan with the nurse or doctor.    ..........................................................................................................................................  Patient/Patient Representative Signature      ..........................................................................................................................................  Patient Representative Print Name and Relationship to Patient    ..................................................               ................................................  Date                                            Time    ..........................................................................................................................................  Reviewed by Signature/Title    ...................................................              ..............................................  Date                                                            Time

## 2017-09-27 NOTE — ED AVS SNAPSHOT
[facility] Emergency Department    [address]    [facility] [address]    Phone:  [phone]    Fax:  [phone]                                       Nevin Alvarado   MRN: [id number]    Department:  [facility] Emergency Department   Date of Visit:  9/27/2017           Patient Information     Date Of Birth          1990        Your diagnoses for this visit were:     Constipation, unspecified constipation type        You were seen by Asher Craft MD.      Follow-up Information     Follow up with Sandra Ramos MD. Call in 1 week.    Specialty:  Internal Medicine    Contact information:    [address] [facility] [address]  [phone]          Discharge Instructions       Discharge Instructions  Constipation  Your doctor has diagnosed you with constipation. Constipation can cause severe cramping pain and your physician thinks this is the cause of your abdominal pain today.  People usually recognize that they are constipated because they have difficulty having bowel movements, are not having bowel movements frequently enough, or are not having large enough bowel movements. Sometimes, especially in children or older people, you do not recognize that you are constipated until it becomes severe. The most common cause of constipation is a combination of lack of exercise and not eating enough fruits, vegetables and whole grains. Constipation can also be a side effect of medications, such as narcotics, or may be caused by a disease of the digestive system.    Return to the Emergency Department if:    Your abdominal pain worsens or does not improve after a bowel movement.    You become very weak.    You get an oral temperature above 102oF or as directed by your doctor.    You have blood in your stools (bright red or black, tarry stools).    You keep throwing up or can t drink liquids.    Your see blood when you throw up.    Your stomach gets bloated or  bigger.    You have new symptoms or anything that worries you.    What can I do to help myself?    If your doctor gave you a cathartic medication, like magnesium citrate or GoLytely  (polyethylene glycol), you can expect to have cramps and gas pains after taking it. You can expect to have a number of bowel movements and even diarrhea in the course of clearing your bowels.  You will know your bowels have been cleaned out after you pass clear liquid. The cramps and gas should let up after you have emptied your bowels. You may want to wait until morning to take this type of medication so you aren t up in the night.     Sometimes instead of cathartics, we recommend laxatives like milk of magnesia to move your bowels more slowly, or an enema to help the bowels to move. Read and follow the package directions, or follow your physician s instructions.    Once you have become very constipated, it takes time for your bowels to return to normal and you need to be very careful to prevent becoming constipated again. Take a laxative if you don t move your bowels at least every two days.       Eat foods that have a lot of fiber. Good choices are fruits, vegetables, prune juice, apple juice and high fiber cereal. Limit dairy products such as milk and cheese, since these can make constipation worse.     Drink plenty of water and other fluids.     When you feel the need to go to the bathroom, go to the bathroom. Don t hold it.    Miralax , Metamucil , Colace , Senna or fiber supplements can be used daily.  Miralax  daily is often the best choice for children.    FOLLOW UP WITH YOUR REGULAR DOCTOR IF YOUR CONSTIPATION IS NOT IMPROVING.  Sometimes, chronic constipation requires further testing to determine the cause. If you are over 50 years old, you may need a colonoscopy if you have not had one before.   If you were given a prescription for medicine here today, be sure to read all of the information (including the package insert)  that comes with your prescription.  This will include important information about the medicine, its side effects, and any warnings that you need to know about.  The pharmacist who fills the prescription can provide more information and answer questions you may have about the medicine.  If you have questions or concerns that the pharmacist cannot address, please call or return to the Emergency Department.   Opioid Medication Information    Pain medications are among the most commonly prescribed medicines, so we are including this information for all our patients. If you did not receive pain medication or get a prescription for pain medicine, you can ignore it.     You may have been given a prescription for an opioid (narcotic) pain medicine and/or have received a pain medicine while here in the Emergency Department. These medicines can make you drowsy or impaired. You must not drive, operate dangerous equipment, or engage in any other dangerous activities while taking these medications. If you drive while taking these medications, you could be arrested for DUI, or driving under the influence. Do not drink any alcohol while you are taking these medications.     Opioid pain medications can cause addiction. If you have a history of chemical dependency of any type, you are at a higher risk of becoming addicted to pain medications.  Only take these prescribed medications to treat your pain when all other options have been tried. Take it for as short a time and as few doses as possible. Store your pain pills in a secure place, as they are frequently stolen and provide a dangerous opportunity for children or visitors in your house to start abusing these powerful medications. We will not replace any lost or stolen medicine.  As soon as your pain is better, you should flush all your remaining medication.     Many prescription pain medications contain Tylenol  (acetaminophen), including Vicodin , Tylenol #3 , Norco , Lortab ,  and Percocet .  You should not take any extra pills of Tylenol  if you are using these prescription medications or you can get very sick.  Do not ever take more than 3000 mg of acetaminophen in any 24 hour period.    All opioids tend to cause constipation. Drink plenty of water and eat foods that have a lot of fiber, such as fruits, vegetables, prune juice, apple juice and high fiber cereal.  Take a laxative if you don t move your bowels at least every other day. Miralax , Milk of Magnesia, Colace , or Senna  can be used to keep you regular.      Remember that you can always come back to the Emergency Department if you are not able to see your regular doctor in the amount of time listed above, if you get any new symptoms, or if there is anything that worries you.      24 Hour Appointment Hotline       To make an appointment at any [facility], call [phone] ([phone]). If you don't have a family doctor or clinic, we will help you find one. [place] clinics are conveniently located to serve the needs of you and your family.             Review of your medicines      Our records show that you are taking the medicines listed below. If these are incorrect, please call your family doctor or clinic.        Dose / Directions Last dose taken    acetaminophen 500 MG tablet   Commonly known as:  TYLENOL   Dose:  500-1000 mg   Quantity:  90 tablet        Take 1-2 tablets (500-1,000 mg) by mouth every 8 hours as needed for mild pain   Refills:  3        IBUPROFEN PO   Dose:  600 mg        Take 600 mg by mouth   Refills:  0        LAMICTAL 100 MG tablet   Dose:  150 mg   Generic drug:  lamoTRIgine        Take 150 mg by mouth At Bedtime   Refills:  0        levonorgestrel 20 MCG/24HR IUD   Commonly known as:  MIRENA   Dose:  1 each        1 each (20 mcg) by Intrauterine route once for 1 dose   Refills:  0        PRISTIQ PO   Dose:  100 mg        Take 100 mg by mouth every morning   Refills:  0        VITAMIN D3 PO    Dose:  5000 Units        Take 5,000 Units by mouth every morning   Refills:  0          ASK your doctor about these medications        Dose / Directions Last dose taken    magnesium citrate 1.745 GM/30ML solution   Dose:  296 mL   Quantity:  296 mL   Ask about: Should I take this medication?        Take 296 mLs by mouth once for 1 dose   Refills:  0                Procedures and tests performed during your visit     Abdomen XR, 2 vw, flat and upright      Orders Needing Specimen Collection     None      Pending Results     No orders found from 9/25/2017 to 9/28/2017.            Pending Culture Results     No orders found from 9/25/2017 to 9/28/2017.            Pending Results Instructions     If you had any lab results that were not finalized at the time of your Discharge, you can call the ED Lab Result RN at [phone]. You will be contacted by this team for any positive Lab results or changes in treatment. The nurses are available 7 days a week from 10A to 6:30P.  You can leave a message 24 hours per day and they will return your call.        Test Results From Your Hospital Stay        9/27/2017 11:00 PM      Narrative     ABDOMEN TWO VIEW 9/27/2017 10:50 PM     HISTORY: Constipation        Impression     IMPRESSION: Contrast noted within the urinary bladder, presumably from  recent intravenous radiographic contrast administration. Increased  density is also noted throughout the colon which may be related to  recent gastrointestinal contrast administration. There is no evidence  of bowel obstruction. No free air under the right hemidiaphragm. An  IUD is seen within the midline of the pelvis.    MONICA RUIZ MD                Clinical Quality Measure: Blood Pressure Screening     Your blood pressure was checked while you were in the emergency department today. The last reading we obtained was  BP: 146/82 . Please read the guidelines below about what these numbers mean and what you should do about them.  If  your systolic blood pressure (the top number) is less than 120 and your diastolic blood pressure (the bottom number) is less than 80, then your blood pressure is normal. There is nothing more that you need to do about it.  If your systolic blood pressure (the top number) is 120-139 or your diastolic blood pressure (the bottom number) is 80-89, your blood pressure may be higher than it should be. You should have your blood pressure rechecked within a year by a primary care provider.  If your systolic blood pressure (the top number) is 140 or greater or your diastolic blood pressure (the bottom number) is 90 or greater, you may have high blood pressure. High blood pressure is treatable, but if left untreated over time it can put you at risk for heart attack, stroke, or kidney failure. You should have your blood pressure rechecked by a primary care provider within the next 4 weeks.  If your provider in the emergency department today gave you specific instructions to follow-up with your doctor or provider even sooner than that, you should follow that instruction and not wait for up to 4 weeks for your follow-up visit.        Thank you for choosing [facility]       Thank you for choosing [facility] for your care. Our goal is always to provide you with excellent care. Hearing back from our patients is one way we can continue to improve our services. Please take a few minutes to complete the written survey that you may receive in the mail after you visit with us. Thank you!        [employer] Information     [employer] gives you secure access to your electronic health record. If you see a primary care provider, you can also send messages to your care team and make appointments. If you have questions, please call your primary care clinic.  If you do not have a primary care provider, please call [phone] and they will assist you.        Care EveryWhere ID     This is your Care EveryWhere ID. This could be used by other  organizations to access your [facility] medical records  [id number]        Equal Access to Services     ZENON DIAMOND : Gabriel Collado, erick singh, mic brannon. So [facility] [phone].    ATENCIÓN: Si habla español, tiene a singh disposición servicios gratuitos de asistencia lingüística. Llame al [phone].    We comply with applicable federal civil rights laws and [place] laws. We do not discriminate on the basis of race, color, national origin, age, disability sex, sexual orientation or gender identity.            After Visit Summary       This is your record. Keep this with you and show to your community pharmacist(s) and doctor(s) at your next visit.

## 2017-09-28 NOTE — ED PROVIDER NOTES
"  History     Chief Complaint:  Constipation     HPI   Nevin Alvarado is a 26 year old female who presents for evaluation of constipation. The patient presented to the ED last week with abdominal pain, a CT was performed (results below). She states that she followed up with her primary care physician who also looked at the scan and told the patient she was \"super backed up.\" According to the patient's chart, her primary care physician reports seeing significant stool in the ascending colon. The patient was told to try taking magnesium citrate, which she did at 1600 today, but has not yet had a bowel movement.     The patient states that she did have a bowel movement yesterday, but reports that it was pebbly and not very much. She comments that she has not had a normal bowel movement in a couple of weeks. She had some nausea and lightheadedness this morning, but no vomiting. The patient has a history of inserting foreign bodies into her rectum, but denies this today.     CT Abdomen Pelvis w/o contrast (9/19/2017)  IMPRESSION: Unremarkable unenhanced CT of the abdomen and pelvis.  Specifically, no foreign body is seen within the gastrointestinal  Tract.    Allergies:  Penicillins-Anaphylaxis   Augmentin---Swelling   Hydrocodone--Vomiting   Oseltamivir--Hives   Vicodin--Nausea and vomiting   Cefazolin--Rash   Cephalosporins--Rash     Medications:    Lamictal   Mirena   Pristiq    Past Medical History:    ADD  Anorexia nervosa with bulimia   Anxiety   Borderline personality disorder   Depression  Migraine   PTSD   Rectal foreign body   History of self-harm  History of swallowed foreign-body     Past Surgical History:    Knee surgery   Endometriosis ablation   Mammoplasty reduction   Carpal tunnel release     Family History:    Cerebrovascular disease--Father     Social History:  Marital Status: Single   Presents to the ED alone  Tobacco Use: Never  Alcohol Use: No  PCP: Sandra Ramos     Review of Systems "   Gastrointestinal: Positive for abdominal pain, constipation and nausea. Negative for vomiting.   Neurological: Positive for light-headedness.   All other systems reviewed and are negative.    Physical Exam   First Vitals:  BP: 146/82  Pulse: 87  Temp: 98.1  F (36.7  C)  Resp: 18  SpO2: 98 %      Physical Exam  Constitutional:  Oriented to person, place, and time.  HENT:   Head:    Normocephalic.   Mouth/Throat:   Oropharynx is clear and moist.   Eyes:    EOM are normal. Pupils are equal, round, and reactive to light.   Neck:    Neck supple.   Cardiovascular:  Normal rate, regular rhythm and normal heart sounds.      Exam reveals no gallop and no friction rub.       No murmur heard.  Pulmonary/Chest:  Effort normal and breath sounds normal.      No respiratory distress. No wheezes. No rales.      No reproducible chest wall pain.  Abdominal:   Soft. No distension. No tenderness. No rebound and no guarding.   Musculoskeletal:  Normal range of motion.   Neurological:   Alert and oriented to person, place, and time.           Moves all 4 extremities spontaneously    Skin:    No rash noted. No pallor.      Emergency Department Course   Imaging:  Abdomen XR, 2 views, Flat and Upright  Contrast noted within the urinary bladder, presumably from  recent intravenous radiographic contrast administration. Increased  density is also noted throughout the colon which may be related to  recent gastrointestinal contrast administration. There is no evidence  of bowel obstruction. No free air under the right hemidiaphragm. An  IUD is seen within the midline of the pelvis.  Report per radiology.     Radiographic findings were communicated with the patient who voiced understanding of the findings.    Interventions:  Medications   pink lady enema (COMPOUNDED: docusate, magnesium citrate, mineral oil, sodium phosphate) (286 mLs Rectal Given 9/27/17 0775)     Emergency Department Course:  Nursing notes and vitals reviewed.  I performed an  exam of the patient as documented above.  The patient was sent for an abdominal x-ray while in the emergency department, findings above.    The patient received an enema while in the ED with a successful large BM after.   Findings and plan explained to the patient. Patient discharged home with instructions regarding supportive care, medications, and reasons to return. The importance of close follow-up was reviewed.     Impression & Plan      Medical Decision Making:  Nevin Alvarado is a 26 year old female who presents for evaluation for decreased stool output without signs of serious intraabdominal problems. Signs and symptoms are consistent with constipation. There are no signs at this point for a need for rectal disimpaction as the patient received an enema while in the ED with a successful large BM after.  There are no signs of obstruction nor other intraabdominal catastrophe.   There are no indications for advanced imaging or admission.  I am going to send them home with instructions on medication management of this. They will then start daily stool softener as well once they are more completely cleaned out. Patient is agreeable with this and questions are answered.      Diagnosis:    ICD-10-CM    1. Constipation, unspecified constipation type K59.00      Disposition:  discharged to home    Scribe disclosure:  I, Nadir Reddy, am serving as a scribe on 9/27/2017 at 10:28 PM to personally document services performed by Dr. Craft based on my observations and the provider's statements to me.   [facility] EMERGENCY DEPARTMENT       Asher Craft MD  09/28/17 0034

## 2017-09-28 NOTE — ED NOTES
"Pt states she had a CT last week and was told it was normal. States was seeing her PMD and \"they said I'm super backed up\". Pt states she had a normal bowel movement yesterday. Pt states she took a bottle of Magnesium Citrate at 1600 today without a BM yet.    ABCs intact. Aox4  "

## 2017-09-28 NOTE — ED NOTES
Pt given enema after education. Pt verbalized comfort with procedure and states she will attempt to hold enema products in as long as she can.  Call light within reach, gripper socks on, commode and toilet paper at bedside.

## 2017-09-28 NOTE — TELEPHONE ENCOUNTER
Reason for Disposition    [1] Constant abdominal pain AND [2] present > 2 hours    Additional Information    Negative: [1] Abdominal pain is main symptom AND [2] adult male    Negative: [1] Abdominal pain is main symptom AND [2] adult female    Negative: Rectal bleeding or blood in stool is main symptom    Negative: Rectal pain or itching is main symptom    Negative: Patient sounds very sick or weak to the triager    Negative: [1] Vomiting AND [2] abdomen looks much more swollen than usual    Negative: [1] Vomiting AND [2] contains bile (green color)    Protocols used: CONSTIPATION-ADULT-SHANNEN Martinezica says that she has been constipated for over 1 month. Abdominal pain present. Pain = 4/10. Pt. Drank a bottle of magnesium Citrate today and had only small stool kaley come out. Pt. Is not sure if she will go to an ER tonight.

## 2017-10-02 PROCEDURE — G0180 MD CERTIFICATION HHA PATIENT: HCPCS | Performed by: INTERNAL MEDICINE

## 2017-10-09 ENCOUNTER — TRANSFERRED RECORDS (OUTPATIENT)
Dept: HEALTH INFORMATION MANAGEMENT | Facility: CLINIC | Age: 27
End: 2017-10-09

## 2017-10-12 ENCOUNTER — MEDICAL CORRESPONDENCE (OUTPATIENT)
Dept: HEALTH INFORMATION MANAGEMENT | Facility: CLINIC | Age: 27
End: 2017-10-12

## 2017-10-16 ENCOUNTER — NURSE TRIAGE (OUTPATIENT)
Dept: NURSING | Facility: CLINIC | Age: 27
End: 2017-10-16
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
## 2017-10-17 ENCOUNTER — NURSE TRIAGE (OUTPATIENT)
Dept: NURSING | Facility: CLINIC | Age: 27
End: 2017-10-17

## 2017-10-18 ENCOUNTER — NURSE TRIAGE (OUTPATIENT)
Dept: NURSING | Facility: CLINIC | Age: 27
End: 2017-10-18

## 2017-10-18 ENCOUNTER — OFFICE VISIT (OUTPATIENT)
Dept: INTERNAL MEDICINE | Facility: CLINIC | Age: 27
End: 2017-10-18
Payer: MEDICARE

## 2017-10-18 VITALS
WEIGHT: 236.5 LBS | TEMPERATURE: 98.6 F | DIASTOLIC BLOOD PRESSURE: 72 MMHG | BODY MASS INDEX: 44.65 KG/M2 | HEART RATE: 100 BPM | OXYGEN SATURATION: 95 % | HEIGHT: 61 IN | SYSTOLIC BLOOD PRESSURE: 124 MMHG

## 2017-10-18 DIAGNOSIS — J02.9 SORETHROAT: Primary | ICD-10-CM

## 2017-10-18 LAB
DEPRECATED S PYO AG THROAT QL EIA: NORMAL
SPECIMEN SOURCE: NORMAL

## 2017-10-18 PROCEDURE — 87880 STREP A ASSAY W/OPTIC: CPT | Performed by: INTERNAL MEDICINE

## 2017-10-18 PROCEDURE — 87081 CULTURE SCREEN ONLY: CPT | Performed by: INTERNAL MEDICINE

## 2017-10-18 PROCEDURE — 99213 OFFICE O/P EST LOW 20 MIN: CPT | Performed by: INTERNAL MEDICINE

## 2017-10-18 RX ORDER — AZITHROMYCIN 250 MG/1
TABLET, FILM COATED ORAL
Qty: 6 TABLET | Refills: 0 | Status: SHIPPED | OUTPATIENT
Start: 2017-10-18 | End: 2017-11-05

## 2017-10-18 NOTE — TELEPHONE ENCOUNTER
"Patient calling reporting her lamictal dose was increased 10/17/17. Stating she took increased dose at 7 pm. Reporting after taking dose throat felt raw and sore. Reporting \"a lot of yellow mucus\" that she had spit out.  Rating sore throat pain a \"7\" on 0-10 pain scale. Throat appears \"red.\" Denies any difficulty breathing. Taking fluids.    Becca Calvert RN  [facility]      "

## 2017-10-18 NOTE — TELEPHONE ENCOUNTER
"Patient calling back stating sore throat pain continues to \"burn.\" Taking Tylenol and salt water gargles with little improvement.  See triage notes from 10/17/17. Reporting white patches on throat. Taking fluids. Denies difficulty breathing.  Transferred to Central Scheduling.  Reason for Disposition    SEVERE (e.g., excruciating) throat pain    Additional Information    Negative: Severe difficulty breathing (e.g., struggling for each breath, speaks in single words, stridor)    Negative: Sounds like a life-threatening emergency to the triager    Negative: [1] Diagnosed strep throat AND [2] taking antibiotic AND [3] symptoms continue    Negative: Throat culture results, call about    Negative: Productive cough is main symptom    Negative: Non-productive cough is main symptom    Negative: Hoarseness is main symptom    Negative: Runny nose is main symptom    Negative: [1] Drooling or spitting out saliva (because can't swallow) AND [2] normal breathing    Negative: Unable to open mouth completely    Negative: [1] Difficulty breathing AND [2] not severe    Negative: Fever > 104 F (40 C)    Negative: [1] Refuses to drink anything AND [2] for > 12 hours    Negative: [1] Drinking very little AND [2] dehydration suspected (e.g., no urine > 12 hours, very dry mouth, very lightheaded)    Negative: Patient sounds very sick or weak to the triager    Protocols used: SORE THROAT-ADULT-AH    "

## 2017-10-18 NOTE — MR AVS SNAPSHOT
"              After Visit Summary   10/18/2017    Nevin Alvarado    MRN: [id number]           Patient Information     Date Of Birth          1990        Visit Information        Provider Department      10/18/2017 10:20 AM Maeve Goncalves MD [facility]        Today's Diagnoses     Sorethroat    -  1       Follow-ups after your visit        Who to contact     If you have questions or need follow up information about today's clinic visit or your schedule please contact [facility] directly at [phone].  Normal or non-critical lab and imaging results will be communicated to you by GetFeedbackhart, letter or phone within 4 business days after the clinic has received the results. If you do not hear from us within 7 days, please contact the clinic through [email or web address] or phone. If you have a critical or abnormal lab result, we will notify you by phone as soon as possible.  Submit refill requests through [employer] or call your pharmacy and they will forward the refill request to us. Please allow 3 business days for your refill to be completed.          Additional Information About Your Visit        MyChart Information     [employer] gives you secure access to your electronic health record. If you see a primary care provider, you can also send messages to your care team and make appointments. If you have questions, please call your primary care clinic.  If you do not have a primary care provider, please call [phone] and they will assist you.        Care EveryWhere ID     This is your Care EveryWhere ID. This could be used by other organizations to access your [facility] medical records  [id number]        Your Vitals Were     Pulse Temperature Height Pulse Oximetry Breastfeeding? BMI (Body Mass Index)    100 98.6  F (37  C) (Oral) 5' 1\" (1.549 m) 95% No 44.69 kg/m2       Blood Pressure from Last 3 Encounters:   10/18/17 124/72   09/27/17 132/71   09/26/17 118/80    Weight from Last 3 " Encounters:   10/18/17 236 lb 8 oz (107.3 kg)   09/26/17 235 lb 11.2 oz (106.9 kg)   08/21/17 231 lb 11.2 oz (105.1 kg)              We Performed the Following     Strep, Rapid Screen          Today's Medication Changes          These changes are accurate as of: 10/18/17 10:48 AM.  If you have any questions, ask your nurse or doctor.               Start taking these medicines.        Dose/Directions    azithromycin 250 MG tablet   Commonly known as:  ZITHROMAX   Used for:  Sorethroat   Started by:  Maeve Goncalves MD        Two tablets first day, then one tablet daily for four days.   Quantity:  6 tablet   Refills:  0            Where to get your medicines      These medications were [place] [facility] [place] [address], [place] [address]     Phone:  [phone]     azithromycin 250 MG tablet                Primary Care Provider Office Phone # Fax #    Sandra Ramos [phone]       [address] [place] [address]        Equal Access to Services     [facility]: Hadii aad ku hadasho Soomaali, waaxda luqadaha, qaybta kaalmada adeegyagregory, mic angelo . So [facility] [phone].    ATENCIÓN: Si habla español, tiene a singh disposición servicios gratuitos de asistencia lingüística. [facility] [phone].    We comply with applicable federal civil rights laws and [place] laws. We do not discriminate on the basis of race, color, national origin, age, disability, sex, sexual orientation, or gender identity.            Thank you!     Thank you for choosing [facility]  for your care. Our goal is always to provide you with excellent care. Hearing back from our patients is one way we can continue to improve our services. Please take a few minutes to complete the written survey that you may receive in the mail after your visit with us. Thank you!             Your Updated Medication List - Protect  others around you: Learn how to safely use, store and throw away your medicines at www.disposemymeds.org.          This list is accurate as of: 10/18/17 10:48 AM.  Always use your most recent med list.                   Brand Name Dispense Instructions for use Diagnosis    acetaminophen 500 MG tablet    TYLENOL    90 tablet    Take 1-2 tablets (500-1,000 mg) by mouth every 8 hours as needed for mild pain    Moderate pain       azithromycin 250 MG tablet    ZITHROMAX    6 tablet    Two tablets first day, then one tablet daily for four days.    Sorethroat       IBUPROFEN PO      Take 600 mg by mouth        LAMICTAL 100 MG tablet   Generic drug:  lamoTRIgine      Take 200 mg by mouth At Bedtime        levonorgestrel 20 MCG/24HR IUD    MIRENA     1 each (20 mcg) by Intrauterine route once for 1 dose        PRISTIQ PO      Take 100 mg by mouth every morning        VITAMIN D3 PO      Take 5,000 Units by mouth every morning

## 2017-10-18 NOTE — PROGRESS NOTES
SUBJECTIVE:   Nevin Alvarado is a 26 year old female who presents to clinic today for the following health issues:      HPI: for the past 3 days she has had increasing sore throat. Can swallow ok. Denies any fever chills or night sweats. Mild cough. No ear plugging or pain. No infectious contacts. No history of seasonal allergies. She is a non-smoker. She is allergic to PCN and keflex.      Problem list and histories reviewed & adjusted, as indicated.  Additional history: as documented    Patient Active Problem List   Diagnosis     Migraine without aura     Depression     Borderline personality disorder     Anxiety     H/O self-harm     ADD (attention deficit disorder)     PTSD (post-traumatic stress disorder)     Morbid obesity (H)     Rectal foreign body     Suicidal intent     Suicidal ideation     Syncope     Past Surgical History:   Procedure Laterality Date     ESOPHAGOSCOPY, GASTROSCOPY, DUODENOSCOPY (EGD), COMBINED N/A 3/9/2017    Procedure: COMBINED ESOPHAGOSCOPY, GASTROSCOPY, DUODENOSCOPY (EGD), REMOVE FOREIGN BODY;  Surgeon: Avis Guzmán MD;  Location: [facility] OR     ESOPHAGOSCOPY, GASTROSCOPY, DUODENOSCOPY (EGD), COMBINED N/A 4/20/2017    Procedure: COMBINED ESOPHAGOSCOPY, GASTROSCOPY, DUODENOSCOPY (EGD), REMOVE FOREIGN BODY;  EGD removal Foregin body;  Surgeon: Lokesh Paula MD;  Location: [facility] OR     ESOPHAGOSCOPY, GASTROSCOPY, DUODENOSCOPY (EGD), COMBINED N/A 6/12/2017    Procedure: COMBINED ESOPHAGOSCOPY, GASTROSCOPY, DUODENOSCOPY (EGD);  COMBINED ESOPHAGOSCOPY, GASTROSCOPY, DUODENOSCOPY (EGD) [[id number]]attempted removal of foreign body;  Surgeon: Pamela Perez MD;  Location: [facility] OR     ESOPHAGOSCOPY, GASTROSCOPY, DUODENOSCOPY (EGD), COMBINED N/A 6/9/2017    Procedure: COMBINED ESOPHAGOSCOPY, GASTROSCOPY, DUODENOSCOPY (EGD), REMOVE FOREIGN BODY;  Esophagoscopy, Gastroscopy, Duodenoscopy, Removal of Foreign Body;  Surgeon: Dejon Marsh MD;  [facility]     EXAM UNDER ANESTHESIA ANUS N/A 1/10/2017    Procedure: EXAM UNDER ANESTHESIA ANUS;  Surgeon: Annmarie Haynes MD;  Location: [facility] OR     HC REMOVE FECAL IMPACTION OR FB W ANESTHESIA N/A 12/18/2016    Procedure: REMOVE FECAL IMPACTION/FOREIGN BODY UNDER ANESTHESIA;  Surgeon: Soham Cano MD;  Location: [facility] OR     KNEE SURGERY Right     removed a small tissue mass from knee     LAPAROSCOPIC ABLATION ENDOMETRIOSIS       LAPAROTOMY EXPLORATORY N/A 1/10/2017    Procedure: LAPAROTOMY EXPLORATORY;  Surgeon: Annmarie Haynes MD;  Location: [facility] OR     lymph nodes removed from neck; benign  age 7     MAMMOPLASTY REDUCTION Bilateral      RELEASE CARPAL TUNNEL Bilateral      SIGMOIDOSCOPY FLEXIBLE N/A 1/10/2017    Procedure: SIGMOIDOSCOPY FLEXIBLE;  Surgeon: Annmarie Haynes MD;  Location: [facility] OR       Social History   Substance Use Topics     Smoking status: Never Smoker     Smokeless tobacco: Never Used     Alcohol use No     Family History   Problem Relation Age of Onset     Type 2 Diabetes Maternal Grandmother      Type 2 Diabetes Paternal Grandmother      Breast Cancer Paternal Grandmother      CEREBROVASCULAR DISEASE Father 53     Myocardial Infarction No family hx of      Coronary Artery Disease Early Onset No family hx of      Ovarian Cancer No family hx of      Colon Cancer No family hx of              Reviewed and updated as needed this visit by clinical staffTobacco  Allergies  Meds  Med Hx  Surg Hx  Fam Hx  Soc Hx      Reviewed and updated as needed this visit by Provider         ROS:  Constitutional, HEENT, cardiovascular, pulmonary, gi and gu systems are negative, except as otherwise noted.      OBJECTIVE:   /72 (BP Location: Right arm, Patient Position: Sitting, Cuff Size: Adult Large)  Pulse 100  Temp 98.6  F (37  C) (Oral)  Ht 5' 1\" (1.549 m)  Wt 236 lb 8 oz (107.3 kg)  SpO2 95%  Breastfeeding? No  BMI 44.69 kg/m2  Body mass index is 44.69 " kg/(m^2).  GENERAL: healthy, alert and no distress  EYES: Eyes grossly normal to inspection, PERRL and conjunctivae and sclerae normal  HENT: normal cephalic/atraumatic, ear canals and TM's normal, oral mucous membranes moist and oropharynx erythematous no pus  NECK: no adenopathy, no asymmetry, masses, or scars and thyroid normal to palpation  RESP: lungs clear to auscultation - no rales, rhonchi or wheezes  CV: regular rate and rhythm, normal S1 S2, no S3 or S4, no murmur, click or rub, no peripheral edema and peripheral pulses strong  ABDOMEN: soft, nontender, no hepatosplenomegaly, no masses and bowel sounds normal      ASSESSMENT/PLAN:       1. Sorethroat     - Strep, Rapid Screen  - azithromycin (ZITHROMAX) 250 MG tablet; Two tablets first day, then one tablet daily for four days.  Dispense: 6 tablet; Refill: 0    Follow up if no improvement.     Maeve Goncalves MD  [facility]

## 2017-10-18 NOTE — TELEPHONE ENCOUNTER
Reason for Disposition    SEVERE (e.g., excruciating) throat pain    Additional Information    Negative: Severe difficulty breathing (e.g., struggling for each breath, speaks in single words, stridor)    Negative: Sounds like a life-threatening emergency to the triager    Negative: [1] Diagnosed strep throat AND [2] taking antibiotic AND [3] symptoms continue    Negative: Throat culture results, call about    Negative: Productive cough is main symptom    Negative: Non-productive cough is main symptom    Negative: Hoarseness is main symptom    Negative: Runny nose is main symptom    Negative: [1] Drooling or spitting out saliva (because can't swallow) AND [2] normal breathing    Negative: Unable to open mouth completely    Negative: [1] Difficulty breathing AND [2] not severe    Negative: Fever > 104 F (40 C)    Negative: [1] Refuses to drink anything AND [2] for > 12 hours    Negative: [1] Drinking very little AND [2] dehydration suspected (e.g., no urine > 12 hours, very dry mouth, very lightheaded)    Negative: Patient sounds very sick or weak to the triager    Protocols used: SORE THROAT-ADULT-

## 2017-10-18 NOTE — NURSING NOTE
"Chief Complaint   Patient presents with     Pharyngitis     Pt has sorethroat started yesterday after taking medication       Initial /72 (BP Location: Right arm, Patient Position: Sitting, Cuff Size: Adult Large)  Pulse 100  Temp 98.6  F (37  C) (Oral)  Ht 5' 1\" (1.549 m)  Wt 236 lb 8 oz (107.3 kg)  SpO2 95%  Breastfeeding? No  BMI 44.69 kg/m2 Estimated body mass index is 44.69 kg/(m^2) as calculated from the following:    Height as of this encounter: 5' 1\" (1.549 m).    Weight as of this encounter: 236 lb 8 oz (107.3 kg).  Medication Reconciliation: complete   Tristan Levin MA    "

## 2017-10-19 LAB
BACTERIA SPEC CULT: NORMAL
SPECIMEN SOURCE: NORMAL

## 2017-10-22 ENCOUNTER — NURSE TRIAGE (OUTPATIENT)
Dept: NURSING | Facility: CLINIC | Age: 27
End: 2017-10-22

## 2017-10-23 NOTE — TELEPHONE ENCOUNTER
"C/o \"stabbing\" LUQ pain only w/ certain activities including bending at waist, lifting objects and to a lesser degree, when walking. Pain began late August. Pt had surgery in mid-Aug \"to remove a FB from my colon\" and LUQ pain started 2 weeks post-op. No vomiting or diarrhea. No fever. ED visits x3 in past 2 mo for abd pain. 9/27 ED thought pain r/t constipation - gave enema w/ large results but pt says pain did not improve. Now on stool softener qday and having BM 2x/day. Hx inserting FB in rectum or swallowing FB but denies this recently Thinks L side of abd looks a little swollen but she isn't sure. Denies pregnancy. Denies being sexually active. Does have IUD. No lower abd pain tonight. Advised see provider within 24 hrs. Enc return to PCP for this LUQ pain. Call back if new/worse sx. Mavis Martinez RN/FNA    Reason for Disposition    [1] MODERATE pain (e.g., interferes with normal activities) AND [2] comes and goes (cramps) AND [3] present > 24 hours  (Exception: pain with Vomiting or Diarrhea - see that Guideline)    Additional Information    Negative: Shock suspected (e.g., cold/pale/clammy skin, too weak to stand, low BP, rapid pulse)    Negative: Difficult to awaken or acting confused  (e.g., disoriented, slurred speech)    Negative: Passed out (i.e., lost consciousness, collapsed and was not responding)    Negative: Sounds like a life-threatening emergency to the triager    Negative: Chest pain    Pain is mainly in upper abdomen  (if needed ask: \"is it mainly above the belly button?\")    Negative: Severe difficulty breathing (e.g., struggling for each breath, speaks in single words)    Negative: Shock suspected (e.g., cold/pale/clammy skin, too weak to stand, low BP, rapid pulse)    Negative: Difficult to awaken or acting confused  (e.g., disoriented, slurred speech)    Negative: Passed out (i.e., lost consciousness, collapsed and was not responding)    Negative: Visible sweat on face or sweat dripping down " "face    Negative: Sounds like a life-threatening emergency to the triager    Negative: Followed an abdomen (stomach) injury    Negative: Chest pain    Negative: [1] SEVERE pain (e.g., excruciating) AND [2] present > 1 hour    Negative: [1] Pain lasts > 10 minutes AND [2] age > 50    Negative: [1] Pain lasts > 10 minutes AND [2] age > 40 AND [3] associated chest, arm, neck, upper back or jaw pain    Negative: [1] Pain lasts > 10 minutes AND [2] age > 35 AND [3] at least one cardiac risk factor (i.e., hypertension, diabetes, obesity, smoker or strong family history of heart disease)    Negative: [1] Pain lasts > 10 minutes AND [2] history of heart disease (i.e., heart attack, bypass surgery, angina, angioplasty, CHF; not just a heart murmur)    Negative: [1] Pain lasts > 10 minutes AND [2] difficulty breathing    Negative: [1] Vomiting AND [2] contains red blood  (Exception: few streaks and only occurred once)    Negative: [1] Vomiting AND [2] contains black (\"coffee ground\") material    Negative: Blood in bowel movements  (Exception: Blood on surface of BM with constipation)    Negative: Black or tarry bowel movements  (Exception: chronic-unchanged  black-grey bowel movements AND is taking iron pills or Pepto-bismol)    Negative: [1] Pregnant > 24 weeks AND [2] hand or face swelling    Negative: Patient sounds very sick or weak to the triager    Negative: [1] MILD-MODERATE pain AND [2] constant AND [3] present > 2 hours    Negative: [1] MILD-MODERATE pain AND [2] not relieved by antacids    Negative: [1] Vomiting AND [2] contains bile (green color)    Negative: [1] Vomiting AND [2] abdomen looks much more swollen than usual    Negative: White of the eyes have turned yellow (i.e., jaundice)    Negative: Fever > 103 F (39.4 C)    Negative: [1] Fever > 101 F (38.3 C) AND [2] age > 60    Negative: [1] Fever > 101 F (38.3 C) AND [2] bedridden (e.g., nursing home patient, CVA, chronic illness, recovering from surgery)    " Negative: [1] Fever > 100.5 F (38.1 C) AND [2] diabetes mellitus or weak immune system (e.g., HIV positive, cancer chemo, splenectomy, organ transplant, chronic steroids)    Protocols used: ABDOMINAL PAIN - UPPER-ADULT-AH, ABDOMINAL PAIN - FEMALE-ADULT-AH

## 2017-10-24 ENCOUNTER — APPOINTMENT (OUTPATIENT)
Dept: GENERAL RADIOLOGY | Facility: CLINIC | Age: 27
End: 2017-10-24
Attending: PHYSICIAN ASSISTANT
Payer: MEDICARE

## 2017-10-24 ENCOUNTER — HOSPITAL ENCOUNTER (EMERGENCY)
Facility: CLINIC | Age: 27
Discharge: HOME | End: 2017-10-24
Attending: EMERGENCY MEDICINE | Admitting: EMERGENCY MEDICINE
Payer: MEDICARE

## 2017-10-24 ENCOUNTER — TELEPHONE (OUTPATIENT)
Dept: INTERNAL MEDICINE | Facility: CLINIC | Age: 27
End: 2017-10-24

## 2017-10-24 VITALS
BODY MASS INDEX: 43.43 KG/M2 | TEMPERATURE: 98.6 F | SYSTOLIC BLOOD PRESSURE: 132 MMHG | WEIGHT: 230 LBS | OXYGEN SATURATION: 99 % | HEIGHT: 61 IN | DIASTOLIC BLOOD PRESSURE: 64 MMHG | RESPIRATION RATE: 20 BRPM

## 2017-10-24 DIAGNOSIS — R42 DIZZINESS: ICD-10-CM

## 2017-10-24 DIAGNOSIS — J06.9 UPPER RESPIRATORY TRACT INFECTION, UNSPECIFIED TYPE: ICD-10-CM

## 2017-10-24 LAB — HCG UR QL: NEGATIVE

## 2017-10-24 PROCEDURE — 25000131 ZZH RX MED GY IP 250 OP 636 PS 637: Mod: GY | Performed by: PHYSICIAN ASSISTANT

## 2017-10-24 PROCEDURE — A9270 NON-COVERED ITEM OR SERVICE: HCPCS | Mod: GY | Performed by: PHYSICIAN ASSISTANT

## 2017-10-24 PROCEDURE — 99284 EMERGENCY DEPT VISIT MOD MDM: CPT | Mod: 25

## 2017-10-24 PROCEDURE — 71020 XR CHEST 2 VW: CPT

## 2017-10-24 PROCEDURE — 81025 URINE PREGNANCY TEST: CPT | Performed by: EMERGENCY MEDICINE

## 2017-10-24 RX ORDER — MECLIZINE HYDROCHLORIDE 25 MG/1
25 TABLET ORAL ONCE
Status: COMPLETED | OUTPATIENT
Start: 2017-10-24 | End: 2017-10-24

## 2017-10-24 RX ADMIN — MECLIZINE HYDROCHLORIDE 25 MG: 25 TABLET ORAL at 19:51

## 2017-10-24 ASSESSMENT — ENCOUNTER SYMPTOMS
VOMITING: 0
NAUSEA: 0
COUGH: 1
DIZZINESS: 1
ABDOMINAL PAIN: 0
HEADACHES: 1
SHORTNESS OF BREATH: 0
FEVER: 1

## 2017-10-24 NOTE — ED AVS SNAPSHOT
[facility] Emergency Department    [address] [facility] [address]    Phone:  [phone]    Fax:  [phone]                                       Nevin Alvarado   MRN: [id number]    Department:  [facility] Emergency Department   Date of Visit:  10/24/2017           Patient Information     Date Of Birth          1990        Your diagnoses for this visit were:     Upper respiratory tract infection, unspecified type     Dizziness        You were seen by Reece Forman MD.      Follow-up Information     Follow up with Sandra Ramos MD In 2 days.    Specialty:  Internal Medicine    Why:  As needed    Contact information:    [address] [facility] [address]  [phone]          Follow up with [facility] Emergency Department.    Specialty:  EMERGENCY MEDICINE    Why:  As needed, If symptoms worsen    Contact information:    [address] [facility] [id number] [phone]        Discharge Instructions       Discharge Instructions  Dizziness (Lightheaded)  Today you were seen for dizziness.  Dizziness can be caused by many things and it can be very difficult to determine the cause of dizziness.  At this time, your provider has found no signs that your dizziness is due to a serious or life-threatening condition. However, sometimes there is a serious problem that does not show up right away, and it is important for you to follow up with your regular provider as instructed.  Generally, every Emergency Department visit should have a follow-up clinic visit with either a primary or a specialty clinic/provider. Please follow-up as instructed by your emergency provider today.      Return to the Emergency Department if:      You pass out (fainting or falling out), especially during exercise.      You develop chest pain, chest pressure or difficulty breathing.    Your feel an irregular heartbeat.    You have excessive  vaginal bleeding, or blood in your stool or vomit (throw up).    You have a high fever.    Your symptoms get worse or more frequent.    If when you begin to feel dizzy or lightheaded, it is important to sit down or lay down immediately to prevent injury from falling.  If you were given a prescription for medicine here today, be sure to read all of the information (including the package insert) that comes with your prescription.  This will include important information about the medicine, its side effects, and any warnings that you need to know about.  The pharmacist who fills the prescription can provide more information and answer questions you may have about the medicine.  If you have questions or concerns that the pharmacist cannot address, please call or return to the Emergency Department.   Remember that you can always come back to the Emergency Department if you are not able to see your regular provider in the amount of time listed above, if you get any new symptoms, or if there is anything that worries you.        Future Appointments        Provider Department Dept Phone Center    10/25/2017 9:00 AM Lola Wood PA-C [facility] [phone]       24 Hour Appointment Hotline       To make an appointment at any [facility], call [phone] ([phone]). If you don't have a family doctor or clinic, we will help you find one. [facility] are conveniently located to serve the needs of you and your family.             Review of your medicines      Our records show that you are taking the medicines listed below. If these are incorrect, please call your family doctor or clinic.        Dose / Directions Last dose taken    acetaminophen 500 MG tablet   Commonly known as:  TYLENOL   Dose:  500-1000 mg   Quantity:  90 tablet        Take 1-2 tablets (500-1,000 mg) by mouth every 8 hours as needed for mild pain   Refills:  3        azithromycin 250 MG tablet   Commonly  known as:  ZITHROMAX   Quantity:  6 tablet        Two tablets first day, then one tablet daily for four days.   Refills:  0        IBUPROFEN PO   Dose:  600 mg        Take 600 mg by mouth   Refills:  0        LAMICTAL 100 MG tablet   Dose:  200 mg   Generic drug:  lamoTRIgine        Take 200 mg by mouth At Bedtime   Refills:  0        levonorgestrel 20 MCG/24HR IUD   Commonly known as:  MIRENA   Dose:  1 each        1 each (20 mcg) by Intrauterine route once for 1 dose   Refills:  0        PRISTIQ PO   Dose:  100 mg        Take 100 mg by mouth every morning   Refills:  0        VITAMIN D3 PO   Dose:  5000 Units        Take 5,000 Units by mouth every morning   Refills:  0                Procedures and tests performed during your visit     Chest XR,  PA & LAT    HCG qualitative urine      Orders Needing Specimen Collection     None      Pending Results     No orders found from 10/22/2017 to 10/25/2017.            Pending Culture Results     No orders found from 10/22/2017 to 10/25/2017.            Pending Results Instructions     If you had any lab results that were not finalized at the time of your Discharge, you can call the ED Lab Result RN at [phone]. You will be contacted by this team for any positive Lab results or changes in treatment. The nurses are available 7 days a week from 10A to 6:30P.  You can leave a message 24 hours per day and they will return your call.        Test Results From Your Hospital Stay        10/24/2017  8:55 PM      Narrative     CHEST TWO VIEW   10/24/2017 8:04 PM     HISTORY: Productive cough, fevers.    COMPARISON: 5/1/2017        Impression     IMPRESSION: Normal.    FLETCHER PACE MD         10/24/2017  8:56 PM      Component Results     Component Value Ref Range & Units Status    HCG Qual Urine Negative NEG^Negative Final    This test is for screening purposes.  Results should be interpreted along with   the clinical picture.  Confirmation testing is available if warranted by    ordering WWJ034, HCG Quantitative Pregnancy.                  Clinical Quality Measure: Blood Pressure Screening     Your blood pressure was checked while you were in the emergency department today. The last reading we obtained was  BP: 132/64 . Please read the guidelines below about what these numbers mean and what you should do about them.  If your systolic blood pressure (the top number) is less than 120 and your diastolic blood pressure (the bottom number) is less than 80, then your blood pressure is normal. There is nothing more that you need to do about it.  If your systolic blood pressure (the top number) is 120-139 or your diastolic blood pressure (the bottom number) is 80-89, your blood pressure may be higher than it should be. You should have your blood pressure rechecked within a year by a primary care provider.  If your systolic blood pressure (the top number) is 140 or greater or your diastolic blood pressure (the bottom number) is 90 or greater, you may have high blood pressure. High blood pressure is treatable, but if left untreated over time it can put you at risk for heart attack, stroke, or kidney failure. You should have your blood pressure rechecked by a primary care provider within the next 4 weeks.  If your provider in the emergency department today gave you specific instructions to follow-up with your doctor or provider even sooner than that, you should follow that instruction and not wait for up to 4 weeks for your follow-up visit.        Thank you for choosing [facility]       Thank you for choosing [facility] for your care. Our goal is always to provide you with excellent care. Hearing back from our patients is one way we can continue to improve our services. Please take a few minutes to complete the written survey that you may receive in the mail after you visit with us. Thank you!        [employer] Information     [facility] gives you secure access to your electronic health record. If you see a  primary care provider, you can also send messages to your care team and make appointments. If you have questions, please call your primary care clinic.  If you do not have a primary care provider, please call [phone] and they will assist you.        Care EveryWhere ID     This is your Care EveryWhere ID. This could be used by other organizations to access your [facility] medical records  [id number]        Equal Access to Services     ZENON DIAMOND : Gabriel Collado, erick singh, taylor kaalmagregory tenorio, mic persaud. So [facility] [phone].    ATENCIÓN: Si habla español, tiene a singh disposición servicios gratuitos de asistencia lingüística. Llame al [phone].    We comply with applicable federal civil rights laws and [place] laws. We do not discriminate on the basis of race, color, national origin, age, disability, sex, sexual orientation, or gender identity.            After Visit Summary       This is your record. Keep this with you and show to your community pharmacist(s) and doctor(s) at your next visit.

## 2017-10-24 NOTE — ED AVS SNAPSHOT
[facility] Emergency Department    [address]    [facility] [address]    Phone:  [phone]    Fax:  [phone]                                       Nevin Alvarado   MRN: [id number]    Department:  [facility] Emergency Department   Date of Visit:  10/24/2017           After Visit Summary Signature Page     I have received my discharge instructions, and my questions have been answered. I have discussed any challenges I see with this plan with the nurse or doctor.    ..........................................................................................................................................  Patient/Patient Representative Signature      ..........................................................................................................................................  Patient Representative Print Name and Relationship to Patient    ..................................................               ................................................  Date                                            Time    ..........................................................................................................................................  Reviewed by Signature/Title    ...................................................              ..............................................  Date                                                            Time

## 2017-10-24 NOTE — TELEPHONE ENCOUNTER
"Nevin Alvarado is a 26 year old female who calls with dizziness, extreme fatigue, lowgrade temp, weakness.    NURSING ASSESSMENT:  Description:   Pt is calling, she was seen on 10/18 for a sore throat, tested negative for strep, and Was put on z-pack. Sunday night she went to sleep, and did not wake up until 12:30pm today (tuesday). When she woke up this afternoon, she has been very shaky. Her hands are shaky. Her body feels like it is twitching in the \"inside.\" She has a lowgrade fever. 30 mins ago it was 99.9. She feels dizzy. When she gets up to go to the bathroom, it feels like the room is spinning. She feels lethargic, and is very fatigued. And feels like if she were to fall asleep now, she would not be able to wake up again.   Advised that pt go to ED to be evaluated tonight. Patient stated understanding, and agreed to plan of care. She does not have anyone to drive her in, so advised that she call 911 for an ambulance.       RECOMMENDED DISPOSITION:  Call 911 - and go to ED to be evaluated.  Will comply with recommendation: Yes  If further questions/concerns or if symptoms do not improve, worsen or new symptoms develop, call your PCP or [facility] as soon as possible.      Guideline used:  Telephone Triage Protocols for Nurses, Fifth Edition, Melody Diaz RN    "

## 2017-10-25 NOTE — DISCHARGE INSTRUCTIONS
Discharge Instructions  Dizziness (Lightheaded)  Today you were seen for dizziness.  Dizziness can be caused by many things and it can be very difficult to determine the cause of dizziness.  At this time, your provider has found no signs that your dizziness is due to a serious or life-threatening condition. However, sometimes there is a serious problem that does not show up right away, and it is important for you to follow up with your regular provider as instructed.  Generally, every Emergency Department visit should have a follow-up clinic visit with either a primary or a specialty clinic/provider. Please follow-up as instructed by your emergency provider today.      Return to the Emergency Department if:      You pass out (fainting or falling out), especially during exercise.      You develop chest pain, chest pressure or difficulty breathing.    Your feel an irregular heartbeat.    You have excessive vaginal bleeding, or blood in your stool or vomit (throw up).    You have a high fever.    Your symptoms get worse or more frequent.    If when you begin to feel dizzy or lightheaded, it is important to sit down or lay down immediately to prevent injury from falling.  If you were given a prescription for medicine here today, be sure to read all of the information (including the package insert) that comes with your prescription.  This will include important information about the medicine, its side effects, and any warnings that you need to know about.  The pharmacist who fills the prescription can provide more information and answer questions you may have about the medicine.  If you have questions or concerns that the pharmacist cannot address, please call or return to the Emergency Department.   Remember that you can always come back to the Emergency Department if you are not able to see your regular provider in the amount of time listed above, if you get any new symptoms, or if there is anything that worries  you.

## 2017-10-25 NOTE — ED PROVIDER NOTES
"  History     Chief Complaint:  Dizziness       HPI   Nevin Alvarado is a 26 year old female with a complex medical history, who presents with dizziness and congestion. The patient reports last week she went to a clinic for sore throat, had a negative strep test, and was prescribed a Z Fitz. She took a couple days of the prescription and today notes sore throat is improved. She did not complete the prescription. She reports that she fell asleep Sunday evening and slept all day Monday waking up this afternoon. She awoke with increased nasal congestion and dizziness noting the \"room was shifting.\" The patient notes congestion, productive cough, and low grade fevers at home, but denies chest pain, shortness of breath, abdominal pain, nausea, and vomiting. She tried meclizine on Sunday. Has not taken any medication today. Also has a mild frontal headache, denies focal numbness, tingling, or weakness.       Allergies:  Penicillins  Augmentin  Blood-Group Specific Substance  Hydrocodone  Influenza Vaccines  Oseltamivir  Vicodin [Hydrocodone-Acetaminophen]  Cefazolin  Cephalosporins     Medications:      IBUPROFEN PO   lamoTRIgine (LAMICTAL) 100 MG tablet   acetaminophen (TYLENOL) 500 MG tablet   levonorgestrel (MIRENA) 20 MCG/24HR IUD   Desvenlafaxine Succinate (PRISTIQ PO)   Cholecalciferol (VITAMIN D3 PO)   azithromycin (ZITHROMAX) 250 MG tablet       Past Medical History:    Past Medical History:   Diagnosis Date     ADD (attention deficit disorder)      Anorexia nervosa with bulimia      Anxiety      Borderline personality disorder      Depression      H/O self-harm      H/O swallowed foreign body      Lives in independent group home      Migraine without aura      Morbid obesity (H)      PTSD (post-traumatic stress disorder)      Rectal foreign body        Patient Active Problem List    Diagnosis Date Noted     Syncope 07/20/2017     Priority: Medium     Suicidal ideation 06/09/2017     Priority: Medium     " Suicidal intent 04/20/2017     Priority: Medium     Rectal foreign body 01/11/2017     Priority: Medium     Migraine without aura      Priority: Medium     no known triggers; on Topamax bid and Imitrex PRN       Depression      Priority: Medium     Borderline personality disorder      Priority: Medium     Anxiety      Priority: Medium     H/O self-harm      Priority: Medium     ADD (attention deficit disorder)      Priority: Medium     PTSD (post-traumatic stress disorder)      Priority: Medium     Morbid obesity (H)      Priority: Medium        Past Surgical History:    Past Surgical History:   Procedure Laterality Date     ESOPHAGOSCOPY, GASTROSCOPY, DUODENOSCOPY (EGD), COMBINED N/A 3/9/2017    Procedure: COMBINED ESOPHAGOSCOPY, GASTROSCOPY, DUODENOSCOPY (EGD), REMOVE FOREIGN BODY;  Surgeon: Avis Guzmán MD;  Location: [facility] OR     ESOPHAGOSCOPY, GASTROSCOPY, DUODENOSCOPY (EGD), COMBINED N/A 4/20/2017    Procedure: COMBINED ESOPHAGOSCOPY, GASTROSCOPY, DUODENOSCOPY (EGD), REMOVE FOREIGN BODY;  EGD removal Foregin body;  Surgeon: Lokesh Paula MD;  Location: [facility] OR     ESOPHAGOSCOPY, GASTROSCOPY, DUODENOSCOPY (EGD), COMBINED N/A 6/12/2017    Procedure: COMBINED ESOPHAGOSCOPY, GASTROSCOPY, DUODENOSCOPY (EGD);  COMBINED ESOPHAGOSCOPY, GASTROSCOPY, DUODENOSCOPY (EGD) [[id number]]attempted removal of foreign body;  Surgeon: Pamela Perez MD;  Location: [facility]     ESOPHAGOSCOPY, GASTROSCOPY, DUODENOSCOPY (EGD), COMBINED N/A 6/9/2017    Procedure: COMBINED ESOPHAGOSCOPY, GASTROSCOPY, DUODENOSCOPY (EGD), REMOVE FOREIGN BODY;  Esophagoscopy, Gastroscopy, Duodenoscopy, Removal of Foreign Body;  Surgeon: Dejon Marsh MD;  Location: [facility]     EXAM UNDER ANESTHESIA ANUS N/A 1/10/2017    Procedure: EXAM UNDER ANESTHESIA ANUS;  Surgeon: Annmarie Haynes MD;  Location: [facility]     HC REMOVE FECAL IMPACTION OR FB W ANESTHESIA N/A 12/18/2016    Procedure: REMOVE FECAL IMPACTION/FOREIGN  BODY UNDER ANESTHESIA;  Surgeon: Soham Cano MD;  Location: [facility] OR     KNEE SURGERY Right     removed a small tissue mass from knee     LAPAROSCOPIC ABLATION ENDOMETRIOSIS       LAPAROTOMY EXPLORATORY N/A 1/10/2017    Procedure: LAPAROTOMY EXPLORATORY;  Surgeon: Annmarie Haynes MD;  Location: [facility] OR     lymph nodes removed from neck; benign  age 7     MAMMOPLASTY REDUCTION Bilateral      RELEASE CARPAL TUNNEL Bilateral      SIGMOIDOSCOPY FLEXIBLE N/A 1/10/2017    Procedure: SIGMOIDOSCOPY FLEXIBLE;  Surgeon: Annmarie Haynes MD;  Location: [facility] OR        Family History:    family history includes Breast Cancer in her paternal grandmother; CEREBROVASCULAR DISEASE (age of onset: 53) in her father; Type 2 Diabetes in her maternal grandmother and paternal grandmother. There is no history of Myocardial Infarction, Coronary Artery Disease Early Onset, Ovarian Cancer, or Colon Cancer.    Social History:   reports that she has never smoked. She has never used smokeless tobacco. She reports that she does not drink alcohol or use illicit drugs.    PCP: Sandra Ramos     Review of Systems   Constitutional: Positive for fever (low grade).   Respiratory: Positive for cough (productive). Negative for shortness of breath.    Cardiovascular: Negative for chest pain.   Gastrointestinal: Negative for abdominal pain, nausea and vomiting.   Neurological: Positive for dizziness and headaches.   All other systems reviewed and are negative.      Physical Exam     Patient Vitals for the past 24 hrs:   BP Temp Temp src Heart Rate Resp SpO2 Height Weight   10/24/17 2204 - - - - - 99 % - -   10/24/17 2139 - - - - - 100 % - -   10/24/17 2129 - - - - - 100 % - -   10/24/17 2127 132/64 - - - - - - -   10/24/17 2044 - - - - - 97 % - -   10/24/17 2039 138/87 - - - - - - -   10/24/17 2030 - - - - - 98 % - -   10/24/17 1930 - - - - - 97 % - -   10/24/17 1915 - - - - - 97 % - -   10/24/17 1910 (!) 144/99 98.6  F (37  C) Oral  "98 20 - - -   10/24/17 1904 - - - - - - 1.549 m (5' 1\") 104.3 kg (230 lb)        General: Alert, interactive in no distress, lying comfortably on bed. tearful throughout interview.  Head:  Scalp is atraumatic.  Eyes:  EOM intact. The pupils are equal, round, and reactive to light. No scleral icterus. No nystagmus.   ENT:                                      Ears:  External ears are normal.  Nose:  The external nose is normal.  Throat:  The oropharynx is mildly erythematous. No tonsillar enlargement. No abscess noted.  Mucus membranes are moist.                 Neck:  Normal range of motion. There is no rigidity. Trachea midline.                                                               CV:  Regular rate and rhythm. No murmur.   Resp:  Breath sounds are clear bilaterally. Non-labored, no retractions or accessory muscle use.  GI:  Abdomen is soft, no distension, no tenderness.   MS:  Normal strength in all 4 extremities. Moves all extremities. No deformity or focal swelling.   Skin:  Warm and dry, No rash or lesions noted.  Neuro: Strength 5/5 with bilateral hand  and ankle dorsi/plantar flexion.  strength equal and strong.  Normal finger-to-nose bilaterally. Normal heel-to-shin. Sensation intact in all 4 extremities. CN 3-12 intact. GCS:15  Psych:  Awake. Alert and orientedx3. Appropriate interactions.   Lymph: No anterior or posterior cervical lymphadenopathy noted.        Emergency Department Course       Imaging:  Chest XR,  PA & LAT   Final Result   IMPRESSION: Normal.      FLETCHER PACE MD         All imaging results were discussed with the patient who voiced understanding of the findings.    Laboratory:  HCG Qual: negative    Interventions:  Medications   meclizine (ANTIVERT) tablet 25 mg (25 mg Oral Given 10/24/17 1951)        Emergency Department Course:  Past medical records, nursing notes, and vitals reviewed.  7:14 PM: I performed an exam of the patient and obtained history, as documented " above.    The patient was sent for a chest x-ray while in the emergency department, findings above.   My supervising provider, Dr. Forman, also interviewed and examined the patient at bedside.   8:18 PM: I rechecked the patient. She reports symptoms are still present. Updated her on chest x-ray results.   Patient drank 1 large cup of fluids and ate crackers here in the ED. Afterwards, reports symptoms have improved.   9:31 PM:  The patient was ambulated here in the emergency department without difficulty.  : I rechecked the patient.  Findings and plan explained to the Patient. Patient discharged home with instructions regarding supportive care, medications, and reasons to return. The importance of close follow-up was reviewed.        Impression & Plan      Medical Decision Making:  Nevin Alvarado is a 26 year old female who presents for evaluation of dizziness, productive cough and congestion. The patient's ED care plan was read prior to seeing the patient. The patient's main concern today was dizziness. The differential diagnosis of dizziness is broad and includes common etiologies such as menieres disease, labyrinthitis, benign positional vertigo, otitis media, etc.  More serious etiologies considered include central etiologies such as tumor, intracerebral bleed, dissection, ischemic cerebral vascular accident.  The history, physical exam including detailed neurologic exam, and workup in the emergency room suggests a benign cause of dizziness today. Patient feels improved after meclizine, oral fluids, and crackers here. The patient has meclizine at home and I advised her to take this for dizziness and also to drink plenty of fluids.     Patient also reported low grade fever and productive cough at home. CXR negative for pneumonia. Suspect viral URI and encouraged her to rest, drink plenty of fluids, and use over the counter cold medicine as needed. The patient agreed with the plan and was discharged in  stable condition.       Diagnosis:    ICD-10-CM    1. Upper respiratory tract infection, unspecified type J06.9    2. Dizziness R42         10/24/2017   Rhina Cruz PA-C  Supervising provider, Reece Forman MD [place], Rhina Scherer PA-C  10/24/17 2239

## 2017-10-25 NOTE — ED PROVIDER NOTES
Emergency Department Attending Supervision Note  10/24/2017  7:35 PM      I evaluated this patient in conjunction with Rhina Cruz PA-C      Briefly, the patient presented with dizziness via EMS.  Was seen last week for sore throat, negative RST, prescribed Azithromycin x 5 days.  Persistent nasal congestion, though ST improved.  2 days prior, slept for reportedly 2 days, and awoke feeling dizzy with associated congestion.  Notes associated headache.  Reports low grade temperature to 99.  Productive cough, though no SOB or chest pain.  Dizziness described as unsteadiness.  No unilateral numbness/weakness, vision changes, nor slurring of speech.      On my exam:  Awake, alert, answers questions appropriately  Lungs CTA  RRR, S1 and S2 present, no M/G/R  Abdomen soft, NT, ND, no guarding or rebound  CN III-XII intact  5/5  strength, 5/5 ankle dorsi/plantarflexion  SILT in BUE and BLE  No clonus at the ankles  Normal finger-to-nose    My impression is URI with associated dizziness.  CXR negative for infiltrate.  Recent RST negative.  Influenza testing not indicated as management is supportive given duration of illness.  No evidence of AOM, otitis externa, nor deep space neck infection.  Neurologic exam is unremarkable and patient is ambulatory with a steady, stable gait.  Able to tolerate PO.  Presentation not felt consistent with CNS process such as CVA, TIA, dissection, tumor mor mass.  Pt improved, and felt stable for DC home with close follow-up.  Recommended rest, fluids, tylenol/ibuprofen for fever, and follow-up with PCP in 2 days.  Return to ER with any new or troubling symptoms.          Diagnosis    ICD-10-CM    1. Upper respiratory tract infection, unspecified type J06.9    2. Dizziness R42          Reece Reyes MD  10/25/17 1944

## 2017-10-25 NOTE — ED NOTES
"Stating feels meclizine helped.  States, \"yeah, I thought it didn't, but after a while I feel like it did help some.\"  "

## 2017-10-25 NOTE — ED NOTES
Pt ambulated sufficiently when asked. She stated she had some dizziness but it decreased as she continued walking. She kept a hand on the wall most of the time as a precautionary measure to steady herself.

## 2017-10-25 NOTE — ED NOTES
Arrives via EMS from home.  Seen last Wednesday at clinic, went in to see if had strep.  Negative for strep.  Started on z-pack.  Took a few days of z-pack and did not finish.  Symptoms have not gotten any better.  Patient states went to sleep on Sunday and slept through Monday, waking today, Tuesday.  Now feeling dizzy/shaky.  Dizzy as if the room is spinning since waking this afternoon.  Low grade fever per patient of 99F at home.  LS clear per EMS, satting 99% on RA en route. Cough over past week, has improved since taking z-pac. Tylenol 1000mg last taken around 1715.  ABCD's intact; A/o x 4.

## 2017-11-03 DIAGNOSIS — F41.1 GENERALIZED ANXIETY DISORDER: ICD-10-CM

## 2017-11-03 DIAGNOSIS — E55.9 VITAMIN D DEFICIENCY DISEASE: ICD-10-CM

## 2017-11-03 DIAGNOSIS — F60.3 BORDERLINE PERSONALITY DISORDER (H): ICD-10-CM

## 2017-11-03 DIAGNOSIS — F33.2 MAJOR DEPRESSIVE DISORDER, RECURRENT EPISODE, SEVERE (H): Primary | ICD-10-CM

## 2017-11-05 ENCOUNTER — HOSPITAL ENCOUNTER (EMERGENCY)
Facility: CLINIC | Age: 27
Discharge: HOME | End: 2017-11-05
Attending: EMERGENCY MEDICINE | Admitting: EMERGENCY MEDICINE
Payer: MEDICARE

## 2017-11-05 ENCOUNTER — NURSE TRIAGE (OUTPATIENT)
Dept: NURSING | Facility: CLINIC | Age: 27
End: 2017-11-05

## 2017-11-05 ENCOUNTER — APPOINTMENT (OUTPATIENT)
Dept: ULTRASOUND IMAGING | Facility: CLINIC | Age: 27
End: 2017-11-05
Attending: EMERGENCY MEDICINE
Payer: MEDICARE

## 2017-11-05 VITALS
OXYGEN SATURATION: 96 % | WEIGHT: 230 LBS | HEART RATE: 86 BPM | TEMPERATURE: 98 F | RESPIRATION RATE: 16 BRPM | BODY MASS INDEX: 43.43 KG/M2 | HEIGHT: 61 IN | SYSTOLIC BLOOD PRESSURE: 146 MMHG | DIASTOLIC BLOOD PRESSURE: 94 MMHG

## 2017-11-05 DIAGNOSIS — M79.89 LEG SWELLING: ICD-10-CM

## 2017-11-05 LAB
ALBUMIN SERPL-MCNC: 3.4 G/DL (ref 3.4–5)
ALBUMIN UR-MCNC: NEGATIVE MG/DL
ALP SERPL-CCNC: 80 U/L (ref 40–150)
ALT SERPL W P-5'-P-CCNC: 23 U/L (ref 0–50)
ANION GAP SERPL CALCULATED.3IONS-SCNC: 5 MMOL/L (ref 3–14)
APPEARANCE UR: CLEAR
AST SERPL W P-5'-P-CCNC: 14 U/L (ref 0–45)
BASOPHILS # BLD AUTO: 0 10E9/L (ref 0–0.2)
BASOPHILS NFR BLD AUTO: 0.4 %
BILIRUB DIRECT SERPL-MCNC: <0.1 MG/DL (ref 0–0.2)
BILIRUB SERPL-MCNC: 0.4 MG/DL (ref 0.2–1.3)
BILIRUB UR QL STRIP: NEGATIVE
BUN SERPL-MCNC: 9 MG/DL (ref 7–30)
CALCIUM SERPL-MCNC: 8.6 MG/DL (ref 8.5–10.1)
CHLORIDE SERPL-SCNC: 107 MMOL/L (ref 94–109)
CO2 SERPL-SCNC: 28 MMOL/L (ref 20–32)
COLOR UR AUTO: YELLOW
CREAT SERPL-MCNC: 0.54 MG/DL (ref 0.52–1.04)
DIFFERENTIAL METHOD BLD: ABNORMAL
EOSINOPHIL # BLD AUTO: 0.2 10E9/L (ref 0–0.7)
EOSINOPHIL NFR BLD AUTO: 2.6 %
ERYTHROCYTE [DISTWIDTH] IN BLOOD BY AUTOMATED COUNT: 14.2 % (ref 10–15)
GFR SERPL CREATININE-BSD FRML MDRD: >90 ML/MIN/1.7M2
GLUCOSE SERPL-MCNC: 97 MG/DL (ref 70–99)
GLUCOSE UR STRIP-MCNC: NEGATIVE MG/DL
HCG SERPL QL: NEGATIVE
HCT VFR BLD AUTO: 33.8 % (ref 35–47)
HGB BLD-MCNC: 11 G/DL (ref 11.7–15.7)
HGB UR QL STRIP: ABNORMAL
IMM GRANULOCYTES # BLD: 0 10E9/L (ref 0–0.4)
IMM GRANULOCYTES NFR BLD: 0.2 %
KETONES UR STRIP-MCNC: NEGATIVE MG/DL
LEUKOCYTE ESTERASE UR QL STRIP: NEGATIVE
LYMPHOCYTES # BLD AUTO: 1.6 10E9/L (ref 0.8–5.3)
LYMPHOCYTES NFR BLD AUTO: 18.4 %
MCH RBC QN AUTO: 28.5 PG (ref 26.5–33)
MCHC RBC AUTO-ENTMCNC: 32.5 G/DL (ref 31.5–36.5)
MCV RBC AUTO: 88 FL (ref 78–100)
MONOCYTES # BLD AUTO: 0.6 10E9/L (ref 0–1.3)
MONOCYTES NFR BLD AUTO: 7 %
MUCOUS THREADS #/AREA URNS LPF: PRESENT /LPF
NEUTROPHILS # BLD AUTO: 6.1 10E9/L (ref 1.6–8.3)
NEUTROPHILS NFR BLD AUTO: 71.4 %
NITRATE UR QL: NEGATIVE
NRBC # BLD AUTO: 0 10*3/UL
NRBC BLD AUTO-RTO: 0 /100
PH UR STRIP: 7 PH (ref 5–7)
PLATELET # BLD AUTO: 275 10E9/L (ref 150–450)
POTASSIUM SERPL-SCNC: 3.8 MMOL/L (ref 3.4–5.3)
PROT SERPL-MCNC: 6.7 G/DL (ref 6.8–8.8)
RBC # BLD AUTO: 3.86 10E12/L (ref 3.8–5.2)
RBC #/AREA URNS AUTO: <1 /HPF (ref 0–2)
SODIUM SERPL-SCNC: 140 MMOL/L (ref 133–144)
SOURCE: ABNORMAL
SP GR UR STRIP: 1.02 (ref 1–1.03)
SQUAMOUS #/AREA URNS AUTO: 2 /HPF (ref 0–1)
TSH SERPL DL<=0.005 MIU/L-ACNC: 2.32 MU/L (ref 0.4–4)
UROBILINOGEN UR STRIP-MCNC: 0 MG/DL (ref 0–2)
WBC # BLD AUTO: 8.5 10E9/L (ref 4–11)
WBC #/AREA URNS AUTO: <1 /HPF (ref 0–2)

## 2017-11-05 PROCEDURE — 99285 EMERGENCY DEPT VISIT HI MDM: CPT | Mod: 25

## 2017-11-05 PROCEDURE — 85025 COMPLETE CBC W/AUTO DIFF WBC: CPT | Performed by: EMERGENCY MEDICINE

## 2017-11-05 PROCEDURE — 93970 EXTREMITY STUDY: CPT

## 2017-11-05 PROCEDURE — 84443 ASSAY THYROID STIM HORMONE: CPT | Performed by: EMERGENCY MEDICINE

## 2017-11-05 PROCEDURE — 80076 HEPATIC FUNCTION PANEL: CPT | Performed by: EMERGENCY MEDICINE

## 2017-11-05 PROCEDURE — 81001 URINALYSIS AUTO W/SCOPE: CPT | Performed by: EMERGENCY MEDICINE

## 2017-11-05 PROCEDURE — 96374 THER/PROPH/DIAG INJ IV PUSH: CPT

## 2017-11-05 PROCEDURE — 25000128 H RX IP 250 OP 636: Performed by: EMERGENCY MEDICINE

## 2017-11-05 PROCEDURE — 84703 CHORIONIC GONADOTROPIN ASSAY: CPT | Performed by: EMERGENCY MEDICINE

## 2017-11-05 PROCEDURE — 93005 ELECTROCARDIOGRAM TRACING: CPT

## 2017-11-05 PROCEDURE — 80048 BASIC METABOLIC PNL TOTAL CA: CPT | Performed by: EMERGENCY MEDICINE

## 2017-11-05 RX ORDER — KETOROLAC TROMETHAMINE 15 MG/ML
15 INJECTION, SOLUTION INTRAMUSCULAR; INTRAVENOUS ONCE
Status: COMPLETED | OUTPATIENT
Start: 2017-11-05 | End: 2017-11-05

## 2017-11-05 RX ADMIN — KETOROLAC TROMETHAMINE 15 MG: 15 INJECTION, SOLUTION INTRAMUSCULAR; INTRAVENOUS at 13:30

## 2017-11-05 ASSESSMENT — ENCOUNTER SYMPTOMS
ACTIVITY CHANGE: 0
FEVER: 0

## 2017-11-05 NOTE — TELEPHONE ENCOUNTER
"  Reason for Disposition    [1] MODERATE leg swelling (e.g., swelling extends up to knees) AND [2] new onset or worsening     \"I called there([facility]) see epic. I didn't have any cream for it, so I took a bath and the pain and swelling is worse. I press on my legs(only from the knees down) and the indent stays there. The pain feels like the pain or body aches you have with a fever, but I don't have the fever.\" Rates pain 5/10. Denies shortness of breath or other sx. Gave home care advice and to elevate legs, patient is \"anxious\" and wants to know why this is happening. Offered appt in am or ER now. Patient prefers to go to ER.    Additional Information    Negative: Severe difficulty breathing (e.g., struggling for each breath, speaks in single words)    Negative: Looks like a broken bone or dislocated joint (e.g., crooked or deformed)    Negative: Sounds like a life-threatening emergency to the triager    Negative: Chest pain    Negative: Followed a leg injury    Negative: [1] Small area of swelling AND [2] followed an insect bite to the area    Negative: Swelling of one ankle joint    Negative: Swelling of knee is main symptom    Negative: Pregnant    Negative: Postpartum (< 1 month since delivery)    Negative: Difficulty breathing at rest    Negative: Entire foot is cool or blue in comparison to other side    Negative: [1] Can't walk or can barely walk AND [2] new onset    Negative: [1] Difficulty breathing with exertion (e.g., walking) AND [2] new onset or worsening    Negative: [1] Red area or streak AND [2] fever    Negative: [1] Swelling is painful to touch AND [2] fever    Negative: [1] Cast on leg or ankle AND [2] now increased pain    Negative: Patient sounds very sick or weak to the triager    Negative: SEVERE leg swelling (e.g., swelling extends above knee, entire leg is swollen, weeping fluid)    Negative: [1] Red area or streak [2] large (> 2 in. or 5 cm)    Negative: [1] Thigh or calf pain AND [2] only 1 " side AND [3] present > 1 hour    Negative: [1] Thigh, calf, or ankle swelling AND [2] only 1 side    Negative: [1] Thigh, calf, or ankle swelling AND [2] bilateral AND [3] 1 side is more swollen    Negative: Swelling of face, arm or hands  (Exception: slight puffiness of fingers occurring during hot weather)    Protocols used: LEG SWELLING AND EDEMA-ADULT-AH

## 2017-11-05 NOTE — TELEPHONE ENCOUNTER
"  Reason for Disposition    [1] MODERATE-SEVERE local itching (i.e., interferes with work, school, activities) AND [2] not improved after 24 hours of hydrocortisone cream    Additional Information    Negative: Looks infected (redness, red streak, pus)    Negative: Patient sounds very sick or weak to the triager    Protocols used: ITCHING - LOCALIZED-ADULT-AH    Pt made an appt for Tuesday for 'itching from my lamictal'.  Her psychiatrist told her to go back to her previous lamictal dose see her primary for the itching.  Was recently on a higher new dose of lamictal and pt feels that she started itching after that and noticing rash spots on her hands about two weeks ago when the dose change occurred.  Told her psychiatrist and was advised to go back to previous dose and see PMD for itching.  \"I have been taking benadryl and it isn't helping.  I itching won't stop.\"  [facility] protocol completed, pt already has an appt for Tuesday within guideline, prefers to go to urgent care today.  Requested UC hours, provided.    Renetta Rhodes RN  [facility]      "

## 2017-11-05 NOTE — ED PROVIDER NOTES
"  History     Chief Complaint:  Leg Pain    HPI   Nevin Alvarado is a 26 year old female who presents to the emergency department today with leg pain. The patient reports that for the last couple of days from the knees down her legs have been swollen, feel like \"body aches\", and when she pushes on them it leaves indents. The right leg is worse than the left. She denies fever, trauma, using her legs a lot, other changes.     Allergies:  Penicillins  Augmentin  Blood-Group Specific Substance  Hydrocodone  Influenza Vaccines  Oseltamivir  Vicodin [Hydrocodone-Acetaminophen]  Cefazolin  Cephalosporins    Medications:    Lamictal  Mirena  Pristiq    Past Medical History:    ADD  Anorexia with bulimia  Anxiety  Borderline personality disorder  Depression  Self Harm  Syncope  Swallowed foreign body  Migraine without aura  Morbid obesity  PTSD  Rectal foreign body    Past Surgical History:    EGD x 4  Exam under anesthesia anus  Remove fecal impaction  Knee surgery  Exploratory Lap   Sigmoidoscopy flexible   Release carpal tunnel  Mammoplasty reduction  Lap ablation endometriosis  Lymph node from neck removal   Family History:    Type 2 diabetes   Breast cancer  Cerebrovascular disease     Social History:  The patient was alone.  Smoking Status: Never  Smokeless Tobacco: Never  Alcohol Use: No  Marital Status:  Single     Review of Systems   Constitutional: Negative for activity change and fever.   Cardiovascular: Positive for leg swelling.   All other systems reviewed and are negative.    Physical Exam     Patient Vitals for the past 24 hrs:   BP Temp Temp src Pulse Resp SpO2 Height Weight   11/05/17 1532 - - - - 16 - - -   11/05/17 1215 (!) 146/94 98  F (36.7  C) Oral 86 16 96 % 1.549 m (5' 1\") 104.3 kg (230 lb)         Physical Exam  General: Comfortable appearing, alert, obese  HEENT:   The scalp and head appear normal    The pupils are equal, round, and reactive to light    Extraocular muscles are intact.    The " nose is normal.    The oropharynx is normal.      Uvula is in the midline.  There is no peritonsillar abscess.  Neck:  Normal range of motion.    Lungs:  Clear.      No rales, no wheezing.      There is no tachypnea.  Non-labored.  Cardiac: Regular rate.      Normal S1 and S2.      No S3 or S4.      No pathological murmur.      No pericardial rub.  Abdomen: Soft. No distension. No tympani. No rebound. Non-tender.  Lymph: No anterior or posterior cervical lymphadenopathy noted.  MS:  Good pulses dorsal feet     1+ pitting edema over the pretibial region bilaterally    No effusions at the knees    No acute traumatic injuries noted     Normal tone.      Normal movement of all extremities.    Neuro:  Normal mentation.  No focal motor or sensory changes.      Speech normal.  Psych:  Awake.     Alert.      Normal affect.      Appropriate interactions.  Skin:  No rash.      No lesions.        Emergency Department Course   ECG:  Indication: Leg swelling  Completed at 1347.  Read at 1350.   Normal Sinus Rhythm  Normal ECG  Rate 72 bpm. IL interval 162. QRS duration 76. QT/QTc 396/433. P-R-T axes 31 66 19.    Imaging:  Radiology findings were communicated with the patient who voiced understanding of the findings.    US Lower Extremity Venous Duplex Bilateral  IMPRESSION: No DVT is seen in either lower extremity.  Report per radiology     Laboratory:  Laboratory findings were communicated with the patient who voiced understanding of the findings.  CBC: WNL (WBC 8.5, HGB 11.0(L), )  BMP: AWNL (Creatinine 0.54)  HCG Qualitative: Negative  TSH with free T4 reflex: 2.32  UA: Clear, Yellow urine with small blood, 2(H) squamous epithelial, and mucous present o/w WNL    Lab Results   Component Value Date    AST 14 11/05/2017     Lab Results   Component Value Date    ALT 23 11/05/2017     No results found for: BILICONJ   Lab Results   Component Value Date    BILITOTAL 0.4 11/05/2017     Lab Results   Component Value Date     ALBUMIN 3.4 11/05/2017     Lab Results   Component Value Date    PROTTOTAL 6.7 11/05/2017      Lab Results   Component Value Date    ALKPHOS 80 11/05/2017     Interventions:  1330: Toradol 15mg IV injection    Emergency Department Course:  Nursing notes and vitals reviewed.  1246: I performed an exam of the patient as documented above.   The patient provided a urine sample here in the emergency department. This was sent for laboratory testing, findings above.  IV was inserted and blood was drawn for laboratory testing, results above.  The patient was sent for a US Lower Extremity while in the emergency department, results above.   1445: Findings and plan explained to the Patient. Patient discharged home with instructions regarding supportive care, medications, and reasons to return. The importance of close follow-up was reviewed. I personally reviewed the laboratory and imaging results with the Patient and answered all related questions prior to discharge.    Impression & Plan      Medical Decision Making:  The patient has noted several days of pitting edema to her legs below the knees.  She denies any increase of sodium in her diet or increased sugars.  She is not sure if she has been gaining weight.  She denies any shortness of breat, no urinary issues.  Her serum albumin was normal and she had no protein her her urine to suggest nephrotic syndrome.  She had no DVTs and my opinion is that she has most likely been gaining weight, has been sitting or standing more than usual, and perhaps has had some sodium dietary indiscretions.  I've recommended knee high compressions socks, weight loss, decrease sodium and sugar in the diet, elevate legs above heart at nightime, and follow up with PCP in 48 hours.    Diagnosis:    ICD-10-CM    1. Leg swelling M79.89 HCG qualitative Blood     Hepatic panel     Disposition:  discharged to home  Scribe Disclosure:  TERRI, Rebecca Ribeiro, am serving as a scribe at 12:46 PM on 11/5/2017  to document services personally performed by Erich Cabrera MD based on my observations and the provider's statements to me.     11/5/2017   [facility] EMERGENCY DEPARTMENT       Erich Cabrera MD  11/05/17 1918

## 2017-11-05 NOTE — DISCHARGE INSTRUCTIONS
Leg Swelling in Both Legs    Swelling of the feet, ankles, and legs is called edema. It is caused by excess fluid that has collected in the tissues. Extra fluid in the body settles in the lowest part because of gravity. This is why the legs and feet are most affected.  Some of the causes for edema include:    Disease of the heart like congestive heart failure    Standing or sitting for long periods of time    Infection of the feet or legs    Blood pooling in the veins of your legs (venous insufficiency)    Dilated veins in your lower leg (varicose veins)    Garters or other clothing that is tight on your legs. This will cause blood to pool in your legs because the clothing limits blood flow.    Some medicines such as hormones like birth control pills, some blood pressure medicines like calcium channel blockers (amlodipine) and steroids, some antidepressants like MAO inhibitors and tricyclics    Menstrual periods that cause you to retain fluids    Many types of renal disease    Liver failure or cirrhosis    Pregnancy, some swelling is normal, but a sudden increase in leg swelling or weight gain can be a sign of a dangerous complication of pregnancy    Poor nutrition    Thyroid disease  Medical treatment will depend on what is causing the swelling in your legs. Your healthcare provider may prescribe water pills (diuretics) to get rid of the extra fluid.  Home care  Follow these guidelines when caring for yourself at home:    Don't wear clothing like garters that is tight on your legs.    Keep your legs up while lying or sitting.    If infection, injury, or recent surgery is causing the swelling, stay off your legs as much as possible until symptoms get better.    If your healthcare provider says that your leg swelling is caused by venous insufficiency or varicose veins, don't sit or  one place for long periods of time. Take breaks and walk about every few hours. Brisk walking is a good exercise. It helps  circulate the blood that has collected in your leg. Talk with your provider about using support stockings to stop daytime leg swelling.    If your provider says that heart disease is causing your leg swelling, follow a low-salt diet to stop extra fluid from staying in your body. You may also need medicine.    Weight loss will help decrease the leg swelling- talk to your PCP about dietary instructions and working towards achieving an ideal body weight.  Follow-up care  Follow up with your healthcare provider, or as advised.  When to seek medical advice  Call your healthcare provider right away if any of these occur:    New shortness of breath or chest pain    Shortness of breath or chest pain that gets worse    Swelling in both legs or ankles that gets worse    Swelling of the abdomen    Redness, warmth, or swelling in one leg    Fever of 100.4 F (38 C) or higher, or as directed by your healthcare provider    Yellow color to your skin or eyes    Rapid, unexplained weight gain    Having to sleep upright or use an increased number of pillows  Date Last Reviewed: 3/31/2016    1143-8300 [employer]. [address] [place] [address], [place] [address]. All rights reserved. This information is not intended as a substitute for professional medical care. Always follow your healthcare professional's instructions.

## 2017-11-05 NOTE — ED NOTES
Patient presents for complaint of bilateral leg pain from the knees down. Patient states they also appear more swollen to her. Patient states that the swelling started x3 days ago and that she developed pain today. ABC intact, A&Ox4.

## 2017-11-05 NOTE — ED AVS SNAPSHOT
[facility] Emergency Department    [address]    [facility] [address]    Phone:  [phone]    Fax:  [phone]                                       Nevin Alvarado   MRN: [id number]    Department:  [facility] Emergency Department   Date of Visit:  11/5/2017           Patient Information     Date Of Birth          1990        Your diagnoses for this visit were:     Leg swelling        You were seen by Erich Cabrera MD and Cheo Gamez MD.      Follow-up Information     Follow up with Sandra Ramos MD In 2 days.    Specialty:  Internal Medicine    Contact information:    [address] [place] [id number]  [phone]          Discharge Instructions         Leg Swelling in Both Legs    Swelling of the feet, ankles, and legs is called edema. It is caused by excess fluid that has collected in the tissues. Extra fluid in the body settles in the lowest part because of gravity. This is why the legs and feet are most affected.  Some of the causes for edema include:    Disease of the heart like congestive heart failure    Standing or sitting for long periods of time    Infection of the feet or legs    Blood pooling in the veins of your legs (venous insufficiency)    Dilated veins in your lower leg (varicose veins)    Garters or other clothing that is tight on your legs. This will cause blood to pool in your legs because the clothing limits blood flow.    Some medicines such as hormones like birth control pills, some blood pressure medicines like calcium channel blockers (amlodipine) and steroids, some antidepressants like MAO inhibitors and tricyclics    Menstrual periods that cause you to retain fluids    Many types of renal disease    Liver failure or cirrhosis    Pregnancy, some swelling is normal, but a sudden increase in leg swelling or weight gain can be a sign of a dangerous complication of pregnancy    Poor nutrition    Thyroid  disease  Medical treatment will depend on what is causing the swelling in your legs. Your healthcare provider may prescribe water pills (diuretics) to get rid of the extra fluid.  Home care  Follow these guidelines when caring for yourself at home:    Don't wear clothing like garters that is tight on your legs.    Keep your legs up while lying or sitting.    If infection, injury, or recent surgery is causing the swelling, stay off your legs as much as possible until symptoms get better.    If your healthcare provider says that your leg swelling is caused by venous insufficiency or varicose veins, don't sit or  one place for long periods of time. Take breaks and walk about every few hours. Brisk walking is a good exercise. It helps circulate the blood that has collected in your leg. Talk with your provider about using support stockings to stop daytime leg swelling.    If your provider says that heart disease is causing your leg swelling, follow a low-salt diet to stop extra fluid from staying in your body. You may also need medicine.    Weight loss will help decrease the leg swelling- talk to your PCP about dietary instructions and working towards achieving an ideal body weight.  Follow-up care  Follow up with your healthcare provider, or as advised.  When to seek medical advice  Call your healthcare provider right away if any of these occur:    New shortness of breath or chest pain    Shortness of breath or chest pain that gets worse    Swelling in both legs or ankles that gets worse    Swelling of the abdomen    Redness, warmth, or swelling in one leg    Fever of 100.4 F (38 C) or higher, or as directed by your healthcare provider    Yellow color to your skin or eyes    Rapid, unexplained weight gain    Having to sleep upright or use an increased number of pillows  Date Last Reviewed: 3/31/2016    9647-7155 [employer]. [address] [facility], [place] [address]. All rights reserved. This  information is not intended as a substitute for professional medical care. Always follow your healthcare professional's instructions.          Future Appointments        Provider Department Dept Phone Center    11/7/2017 3:00 PM Sandra Ramos MD [facility] [phone]       24 Hour Appointment Hotline       To make an appointment at any [facility], call [phone] ([phone]). If you don't have a family doctor or clinic, we will help you find one. [facility] are conveniently located to serve the needs of you and your family.             Review of your medicines      Our records show that you are taking the medicines listed below. If these are incorrect, please call your family doctor or clinic.        Dose / Directions Last dose taken    acetaminophen 500 MG tablet   Commonly known as:  TYLENOL   Dose:  500-1000 mg   Quantity:  90 tablet        Take 1-2 tablets (500-1,000 mg) by mouth every 8 hours as needed for mild pain   Refills:  3        IBUPROFEN PO   Dose:  600 mg        Take 600 mg by mouth   Refills:  0        LAMICTAL 100 MG tablet   Dose:  200 mg   Generic drug:  lamoTRIgine        Take 200 mg by mouth At Bedtime   Refills:  0        levonorgestrel 20 MCG/24HR IUD   Commonly known as:  MIRENA   Dose:  1 each        1 each (20 mcg) by Intrauterine route once for 1 dose   Refills:  0        PRISTIQ PO   Dose:  100 mg        Take 100 mg by mouth every morning   Refills:  0        VITAMIN D3 PO   Dose:  5000 Units        Take 5,000 Units by mouth every morning   Refills:  0                Procedures and tests performed during your visit     Basic metabolic panel    CBC with platelets differential    EKG 12 lead    HCG qualitative Blood    Hepatic panel    Peripheral IV: Standard    TSH with free T4 reflex    UA with Microscopic    US Lower Extremity Venous Duplex Bilateral      Orders Needing Specimen Collection     None      Pending Results     Date and  Time Order Name Status Description    11/5/2017 1333 EKG 12 lead Preliminary     11/5/2017 1249 US Lower Extremity Venous Duplex Bilateral Preliminary             Pending Culture Results     No orders found from 11/3/2017 to 11/6/2017.            Pending Results Instructions     If you had any lab results that were not finalized at the time of your Discharge, you can call the ED Lab Result RN at [phone]. You will be contacted by this team for any positive Lab results or changes in treatment. The nurses are available 7 days a week from 10A to 6:30P.  You can leave a message 24 hours per day and they will return your call.        Test Results From Your Hospital Stay        11/5/2017  1:46 PM      Component Results     Component Value Ref Range & Units Status    WBC 8.5 4.0 - 11.0 10e9/L Final    RBC Count 3.86 3.8 - 5.2 10e12/L Final    Hemoglobin 11.0 (L) 11.7 - 15.7 g/dL Final    Hematocrit 33.8 (L) 35.0 - 47.0 % Final    MCV 88 78 - 100 fl Final    MCH 28.5 26.5 - 33.0 pg Final    MCHC 32.5 31.5 - 36.5 g/dL Final    RDW 14.2 10.0 - 15.0 % Final    Platelet Count 275 150 - 450 10e9/L Final    Diff Method Automated Method  Final    % Neutrophils 71.4 % Final    % Lymphocytes 18.4 % Final    % Monocytes 7.0 % Final    % Eosinophils 2.6 % Final    % Basophils 0.4 % Final    % Immature Granulocytes 0.2 % Final    Nucleated RBCs 0 0 /100 Final    Absolute Neutrophil 6.1 1.6 - 8.3 10e9/L Final    Absolute Lymphocytes 1.6 0.8 - 5.3 10e9/L Final    Absolute Monocytes 0.6 0.0 - 1.3 10e9/L Final    Absolute Eosinophils 0.2 0.0 - 0.7 10e9/L Final    Absolute Basophils 0.0 0.0 - 0.2 10e9/L Final    Abs Immature Granulocytes 0.0 0 - 0.4 10e9/L Final    Absolute Nucleated RBC 0.0  Final         11/5/2017  2:06 PM      Component Results     Component Value Ref Range & Units Status    Sodium 140 133 - 144 mmol/L Final    Potassium 3.8 3.4 - 5.3 mmol/L Final    Chloride 107 94 - 109 mmol/L Final    Carbon Dioxide 28 20 - 32  mmol/L Final    Anion Gap 5 3 - 14 mmol/L Final    Glucose 97 70 - 99 mg/dL Final    Urea Nitrogen 9 7 - 30 mg/dL Final    Creatinine 0.54 0.52 - 1.04 mg/dL Final    GFR Estimate >90 >60 mL/min/1.7m2 Final    Non  GFR Calc    GFR Estimate If Black >90 >60 mL/min/1.7m2 Final    African American GFR Calc    Calcium 8.6 8.5 - 10.1 mg/dL Final         11/5/2017  2:33 PM      Component Results     Component Value Ref Range & Units Status    HCG Qualitative Serum Negative NEG^Negative Final    This test is for screening purposes.  Results should be interpreted along with   the clinical picture.  Confirmation testing is available if warranted by   ordering [id number], HCG Quantitative Pregnancy.           11/5/2017  1:37 PM      Component Results     Component Value Ref Range & Units Status    Color Urine Yellow  Final    Appearance Urine Clear  Final    Glucose Urine Negative NEG^Negative mg/dL Final    Bilirubin Urine Negative NEG^Negative Final    Ketones Urine Negative NEG^Negative mg/dL Final    Specific Gravity Urine 1.017 1.003 - 1.035 Final    Blood Urine Small (A) NEG^Negative Final    pH Urine 7.0 5.0 - 7.0 pH Final    Protein Albumin Urine Negative NEG^Negative mg/dL Final    Urobilinogen mg/dL 0.0 0.0 - 2.0 mg/dL Final    Nitrite Urine Negative NEG^Negative Final    Leukocyte Esterase Urine Negative NEG^Negative Final    Source Midstream Urine  Final    WBC Urine <1 0 - 2 /HPF Final    RBC Urine <1 0 - 2 /HPF Final    Squamous Epithelial /HPF Urine 2 (H) 0 - 1 /HPF Final    Mucous Urine Present (A) NEG^Negative /LPF Final         11/5/2017  2:44 PM      Narrative     US LOWER EXTREMITY VENOUS DUPLEX [id number]/5/2017 2:18 PM    HISTORY:  swelling legs, pain, swelling/pain    TECHNIQUE:Venous Doppler US. Color flow and spectral Doppler with  waveform analysis performed.    COMPARISON: None.    FINDINGS: The right and left lower extremity venous ultrasound is  negative for DVT.  The veins do augment  and compress normally.  No  thrombus is seen.        Impression     IMPRESSION: No DVT is seen in either lower extremity.         11/5/2017  2:10 PM      Component Results     Component Value Ref Range & Units Status    TSH 2.32 0.40 - 4.00 mU/L Final         11/5/2017  2:31 PM      Component Results     Component Value Ref Range & Units Status    Bilirubin Direct <0.1 0.0 - 0.2 mg/dL Final    Bilirubin Total 0.4 0.2 - 1.3 mg/dL Final    Albumin 3.4 3.4 - 5.0 g/dL Final    Protein Total 6.7 (L) 6.8 - 8.8 g/dL Final    Alkaline Phosphatase 80 40 - 150 U/L Final    ALT 23 0 - 50 U/L Final    AST 14 0 - 45 U/L Final                Clinical Quality Measure: Blood Pressure Screening     Your blood pressure was checked while you were in the emergency department today. The last reading we obtained was  BP: (!) 146/94 . Please read the guidelines below about what these numbers mean and what you should do about them.  If your systolic blood pressure (the top number) is less than 120 and your diastolic blood pressure (the bottom number) is less than 80, then your blood pressure is normal. There is nothing more that you need to do about it.  If your systolic blood pressure (the top number) is 120-139 or your diastolic blood pressure (the bottom number) is 80-89, your blood pressure may be higher than it should be. You should have your blood pressure rechecked within a year by a primary care provider.  If your systolic blood pressure (the top number) is 140 or greater or your diastolic blood pressure (the bottom number) is 90 or greater, you may have high blood pressure. High blood pressure is treatable, but if left untreated over time it can put you at risk for heart attack, stroke, or kidney failure. You should have your blood pressure rechecked by a primary care provider within the next 4 weeks.  If your provider in the emergency department today gave you specific instructions to follow-up with your doctor or provider even  sooner than that, you should follow that instruction and not wait for up to 4 weeks for your follow-up visit.        Thank you for choosing [facility]       Thank you for choosing [facility] for your care. Our goal is always to provide you with excellent care. Hearing back from our patients is one way we can continue to improve our services. Please take a few minutes to complete the written survey that you may receive in the mail after you visit with us. Thank you!        [employer] Information     [employer] gives you secure access to your electronic health record. If you see a primary care provider, you can also send messages to your care team and make appointments. If you have questions, please call your primary care clinic.  If you do not have a primary care provider, please call [phone] and they will assist you.        Care EveryWhere ID     This is your Care EveryWhere ID. This could be used by other organizations to access your [facility] medical records  [id number]        Equal Access to Services     ZENON DIAMOND : Gabriel Collado, erick singh, taylor tenorio, mic angelo . So [facility] [phone].    ATENCIÓN: Si habla español, tiene a singh disposición servicios gratuitos de asistencia lingüística. Llame al [phone].    We comply with applicable federal civil rights laws and [place] laws. We do not discriminate on the basis of race, color, national origin, age, disability, sex, sexual orientation, or gender identity.            After Visit Summary       This is your record. Keep this with you and show to your community pharmacist(s) and doctor(s) at your next visit.

## 2017-11-06 LAB — INTERPRETATION ECG - MUSE: NORMAL

## 2017-11-15 ENCOUNTER — NURSE TRIAGE (OUTPATIENT)
Dept: NURSING | Facility: CLINIC | Age: 27
End: 2017-11-15

## 2017-11-16 ENCOUNTER — TELEPHONE (OUTPATIENT)
Dept: OBGYN | Facility: CLINIC | Age: 27
End: 2017-11-16

## 2017-11-16 NOTE — TELEPHONE ENCOUNTER
"Nevin calling concerned about her recent possible exposure to STDs. She reports \"I had unprotected sex (oral and vaginal) like an hour ago and felt something on my partner, now I want to get tested.\" Educated Nevin on common STDs, symptoms and testing. Nevin verbalized \"worry\" about her risks and stated \"I haven't had a PAP exam in years.\" Care advice given per guideline protocol; advised Nevin to further educate herself on STDs (from reputable web sites such as [employer], [employer] or [facility]), monitor herself for symptoms, avoid unprotected sex, see a provider within the next 2 weeks and talk to someone like family or friends for support. Encouraged her to call [facility] back if further questions or concerns. Nevin declined sending a message to her PCP at this time and declined scheduling a clinic appointment. Educated Nevin that there are urgent care clinics and places such as [facility] that could do STD testing as well. Nevin verbalized understanding of care advice given and plans to go to urgent care [facility] ([facility] is open until 11 pm).     Evon Valdez RN  [facility]        Additional Information    Negative: HIV exposure, questions about    Negative: Needlestick, questions about    Negative: Preventing STDs, questions about    Negative: Symptoms of a Sexually Transmitted Disease or Infection (STD, STI)    Negative: AIDS (HIV), questions about    Negative: Bacterial Vaginosis, questions about    Negative: Chlamydia, questions about    Negative: Gonorrhea, questions about    Negative: Hepatitis B, questions about    Negative: Herpes Simplex (Genital), questions about    Negative: Molluscum Contagiosum (Genital), questions about    Negative: Pubic Lice, questions about    Negative: Syphilis, questions about    Negative: Trichomonas Infections, questions about    Warts (Genital) or the HPV Vaccine, questions about    Answer Assessment - Initial Assessment " "Questions  1. SYMPTOMS: \"Do you have any symptoms of an STD?\" If so, ask: \"What are they?\"      None reported  2. TYPE: \"What STD do you have questions about?\"      Warts, herpes, all  3. EXPOSURE: \"Were you exposed to an STD?\" If so, ask: \"When and what kind of exposure?\"      Yes, today, oral and vaginal intercourse   4. PREVENTION: \"Do you have questions about preventing AIDS and other STDs?\"      Use condoms, dental dams  5. PREGNANCY: \"Is there any chance you are pregnant?\" \"When was your last menstrual period?\"      Not reported    Protocols used: STD QUESTIONS-ADULT-AH    "

## 2017-11-16 NOTE — TELEPHONE ENCOUNTER
See previous tele enc 11/15 re: possible exposure to STD. States she now feels cramping and nausea. States she was not seen in an UC as advised. Advised to f/u with PCP or UC as advised. Advised it is unlikely current sx are related to possible exposure to genital warts, but should be seen. Pt states she will go to an UC.

## 2017-11-16 NOTE — PATIENT INSTRUCTIONS
If You Think You Have an STD  Treating a sexually transmitted disease (STD) early limits the problems they can cause. If you have an STD, get treated right away. Ask your partner to be tested, too. Then avoid sex until you ve finished treatment and your healthcare provider says it s OK to have sex again.    Follow your treatment plan  Treatment depends on the type of STD you have. Common treatments include injections and oral pills or liquids. Creams and gels can be applied to sores caused by certain STDs. Follow the tips below:    Get new treatment for each new STD.    Don t use old medicine, even for the same STD. Use medicines as directed.    Don t share medicine unless instructed to do so by your healthcare provider or clinic.  Talk to your partner  If you have an STD, it s your duty to tell all your recent partners so they can be tested and treated. This is one important way to prevent the disease from being spread. Telling a partner that you have an STD can be hard. You may be embarrassed, angry, or afraid. It s often unclear who had the STD first. So try not to place blame. Your healthcare provider may offer some advice on how to begin.  Prevent future problems  Even after you ve been treated, you can still be infected again. This is a common problem. It happens when a partner passes the STD back to you. To avoid this, your partner must be tested. He or she may also need treatment. After treatment, go to any scheduled follow-up visits. Then prevent future problems with safer sex. Limit your number of partners and always use a latex condom.  Diagnosing STDS  Your healthcare provider will take a health history and examine you. During your health history, you will be asked about your sex habits and health history. You may also be asked about drug use. Give honest answers. Your healthcare provider will then check your body for signs of STDs. He or she also may perform one or more of the following  tests:    Fluid is swabbed from any sores. Samples also may be taken from the vagina, penis, mouth, or rectum. The samples are then tested for STDs.    Blood or urine samples may be taken. They are checked for viruses or bacteria that cause STDs.    For women, cells from the cervix (where the vagina and uterus meet) are checked for signs of cancer. This is called a Pap smear. If cell changes are found, a magnifying scope may be used to take a closer look (colposcopy).   Date Last Reviewed: 12/1/2016 2000-2017 [employer]. [address] [place] [id number]. All rights reserved. This information is not intended as a substitute for professional medical care. Always follow your healthcare professional's instructions.        What Are Sexually Transmitted Diseases (STDs)?  A sexually transmitted disease (STD) is a disease that is spread during sex. (An STD can also be called STI for sexually transmitted infection.) You can become infected with an STD if you have sex with someone who has an STD. Any sex that involves the penis, vagina, anus, or mouth can spread disease. Some STDs spread through body fluids such as semen, vaginal fluid, or blood. Others spread through contact with affected skin.  Who is at risk?     Places on or in the body where STDs cause damage include reproductive organs, the rectum, and the mouth.   It doesn t matter if you re straight or anderson, male or female, young or old. Any person who has sex can get an STD. Your risk increases if:    You have more than one partner. The more partners you have, the greater your risk.    Your partner has other partners. If your partner is exposed to an STD, you could be, too.    You or your partner have had sex with other people in the past. Either of you might be carrying an STD from an earlier partner.    You have an STD. The STD may cause sores or other health problems that increase your risk of new infections. Your risk will stay high  unless you change the behaviors that put you at risk of the current infection.  Prevent future problems  Left untreated, certain STDs can lead to cancer or even death. Some can harm unborn babies whose mothers are infected. Others can cause you to not be able to have children (sterility) or can affect changes in behavior or your ability to think. You can prevent these problems with safer sex, regular checkups, and early treatment. Always use a latex condom when you have sex. Get tested if you re at risk. And get treated early if you have an STD.  Getting checked  The only sure way to know if you have an STD is to get checked by a healthcare provider. If you notice a change in how your body looks or feels, have it checked out. But keep in mind, STDs don t always show symptoms. So if you re at risk of STDs, get checked regularly. If you find you have an STD, be sure your partner gets treatment, too. If not, his or her health is at risk. And left untreated, your partner could pass the STD back to you, or on to others.  Common symptoms  Be alert to any changes in your body and your partner s body. Symptoms may appear in or near the vagina, penis, rectum, mouth, or throat. They include:    Unusual discharge    Lumps, bumps, or rashes    Sores that may be painful, itchy, or painless    Itchy skin    Burning with urination    Pain in the pelvis, belly (abdomen), or rectum  Even if you don t have symptoms  You may have an STD, even if you don t have symptoms. If you think you are at risk, get checked. Go to a clinic or to your healthcare provider. If your partner has an STD, you need to be tested too, even if you feel fine.  Vaccines to prevent disease  Vaccines (also called immunizations) are available to prevent hepatitis A and hepatitis B. These are two kinds of STDs. There is also a vaccine to prevent HPV. This is a virus that can be passed from person to person through sexual contact. Ask your healthcare provider  whether any of these vaccines is right for you.   Date Last Reviewed: 11/1/2016 2000-2017 [employer] [address], [place] [address]. All rights reserved. This information is not intended as a substitute for professional medical care. Always follow your healthcare professional's instructions.

## 2017-11-18 ENCOUNTER — TRANSFERRED RECORDS (OUTPATIENT)
Dept: HEALTH INFORMATION MANAGEMENT | Facility: CLINIC | Age: 27
End: 2017-11-18

## 2017-11-18 LAB
ALT SERPL-CCNC: 16 U/L (ref 0–45)
AST SERPL-CCNC: 15 U/L (ref 0–40)
CREAT SERPL-MCNC: 0.6 MG/DL (ref 0.6–1.1)
GFR SERPL CREATININE-BSD FRML MDRD: >60 ML/MIN/1.73M2
GLUCOSE SERPL-MCNC: 108 MG/DL (ref 70–125)
POTASSIUM SERPL-SCNC: 3.8 MMOL/L (ref 3.5–5)

## 2017-11-20 ENCOUNTER — TRANSFERRED RECORDS (OUTPATIENT)
Dept: HEALTH INFORMATION MANAGEMENT | Facility: CLINIC | Age: 27
End: 2017-11-20

## 2017-11-20 ENCOUNTER — TELEPHONE (OUTPATIENT)
Dept: NURSING | Facility: CLINIC | Age: 27
End: 2017-11-20

## 2017-11-20 NOTE — TELEPHONE ENCOUNTER
Dr. Ramos- advised ER per protocol but looks like patient has extensive ER history. Would you advise differently?

## 2017-11-20 NOTE — TELEPHONE ENCOUNTER
Nevin Alvarado is a 27 year old female who calls with back pain.    NURSING ASSESSMENT:  Description:  Lower back pain, sometimes one side sometimes both. Hurts to stand/walk or sit straight. Feels like electrical shock. Back sometimes goes numb. Hurts into legs sometimes. No weakness in legs. Feels nauseous and like passing out when the pain is at its worst. Legs feel numb at times.   Onset/duration:  Several months, but worse recently  Associated symptoms:  See above  Improves/worsens symptoms:  Tylenol, ibuprofen, heat/ice  Pain scale (0-10)   10/10  LMP/preg/breast feeding:  Not pregnant  Last exam/Treatment:  9/26/17  Allergies:   Allergies   Allergen Reactions     Penicillins Anaphylaxis     Augmentin Swelling     Blood-Group Specific Substance Other (See Comments)     Patient has an anti-Cw and non-specific antibodies. Blood product orders may be delayed. Draw one red top and two purple top tubes for all type/screen/crossmatch orders.     Hydrocodone Nausea and Vomiting     vomiting for days     Influenza Vaccines Other (See Comments)     Oseltamivir Hives     med stopped, PN: med stopped     Vicodin [Hydrocodone-Acetaminophen] Nausea and Vomiting     Cefazolin Rash     Cephalosporins Rash       NURSING PLAN: Nursing advice to patient to ER    RECOMMENDED DISPOSITION:  To ED, another person to drive  Will comply with recommendation: Yes  If further questions/concerns or if symptoms do not improve, worsen or new symptoms develop, call your PCP or [facility] as soon as possible.      Guideline used:  Telephone Triage Protocols for Nurses, Fifth Edition, Melody Cabrera RN

## 2017-11-22 ENCOUNTER — TRANSFERRED RECORDS (OUTPATIENT)
Dept: HEALTH INFORMATION MANAGEMENT | Facility: CLINIC | Age: 27
End: 2017-11-22

## 2017-12-01 ENCOUNTER — NURSE TRIAGE (OUTPATIENT)
Dept: NURSING | Facility: CLINIC | Age: 27
End: 2017-12-01

## 2017-12-02 NOTE — TELEPHONE ENCOUNTER
Pt reports having 2 stools today that both were whitish-grey colored. She was discharged from [facility] recently where she was admitted after swallowing a pen spring and a pen clip. Pt states she passed the spring in hospital but has not passed the clip. No notes in EHR because this was recent and out of system.  Pt has a long hx of ingestion of FB and inserting FB into her rectum or vagina. Many ER visits for these incidents. Tonight denies new abdominal pain. Denies jaundice. Denies any past liver or gallbladder problems. Denies ingesting any additional items or unusual/new meds, food,etc that would account for the greyish white colored stools Advised pt to see provider within 24 hours per triage guideline. It is after 10pm so per guideline asked pt to call back tomorrow after 9am for doctor to be paged. Call back tonight if new sx such as abdominal pain, jaundice, etc. Pt voiced understanding and agreement. Mavis Martinez RN/FNA      Reason for Disposition    [1] Stool is light gray or whitish AND [2] unexplained    Additional Information    Negative: Rectal bleeding, bloody stools, or blood in stool (bowel movement)    Negative: Tarry or jet black-colored stool    Negative: [1] Stool color is black (not dark green) AND [2] patient hasn't swallowed substance that causes black stools (e.g., Pepto-Bismol, iron pills)    Negative: Diarrhea stools (i.e., watery stools, or increase in the frequency and looseness of stools)    Negative: [1] Abdominal pain is main symptom AND [2] adult male    Negative: [1] Abdominal pain is main symptom AND [2] adult female    Negative: Yellow eyes (jaundice)    Negative: Patient sounds very sick or weak to the triager    Protocols used: STOOLS - UNUSUAL COLOR-ADULT-AH

## 2017-12-03 ENCOUNTER — NURSE TRIAGE (OUTPATIENT)
Dept: NURSING | Facility: CLINIC | Age: 27
End: 2017-12-03

## 2017-12-03 NOTE — TELEPHONE ENCOUNTER
"    Pt states a history of recent hospitalization for foreign body removal.  States she was discharged and then had to go back in to remove a second object.   \"I just got home from the hospital ([facility]  I have low abdominal and low back pain with white stools.  They said I was going to be fine but they didn't do any testing on my stools.\"  Pt denies new or worsening symptoms since she discharged from the ED.  Advised her to schedule an appointment with her pcp office for tomorrow, transferred to scheduling.  Pt agrees to call back with any new or worsening symptoms.      Trudy Dorado, CARMEN/FNA    "

## 2017-12-05 ENCOUNTER — TRANSFERRED RECORDS (OUTPATIENT)
Dept: HEALTH INFORMATION MANAGEMENT | Facility: CLINIC | Age: 27
End: 2017-12-05

## 2017-12-10 ENCOUNTER — SURGERY - HEALTHEAST (OUTPATIENT)
Dept: GASTROENTEROLOGY | Facility: CLINIC | Age: 27
End: 2017-12-10

## 2017-12-10 ENCOUNTER — TRANSFERRED RECORDS (OUTPATIENT)
Dept: HEALTH INFORMATION MANAGEMENT | Facility: CLINIC | Age: 27
End: 2017-12-10

## 2017-12-10 LAB
ALT SERPL-CCNC: 15 U/L (ref 0–45)
AST SERPL-CCNC: 15 U/L (ref 0–40)
CREAT SERPL-MCNC: 0.66 MG/DL (ref 0.6–1.1)
GFR SERPL CREATININE-BSD FRML MDRD: >60 ML/MIN/1.73M2
GLUCOSE SERPL-MCNC: 102 MG/DL (ref 70–125)
INR PPP: 1.12 (ref 0.9–1.1)

## 2017-12-10 ASSESSMENT — MIFFLIN-ST. JEOR
SCORE: 1705.65
SCORE: 1740.58

## 2017-12-11 ENCOUNTER — TRANSFERRED RECORDS (OUTPATIENT)
Dept: HEALTH INFORMATION MANAGEMENT | Facility: CLINIC | Age: 27
End: 2017-12-11

## 2017-12-11 LAB — POTASSIUM SERPL-SCNC: 4 MMOL/L (ref 3.5–5)

## 2017-12-20 ENCOUNTER — NURSE TRIAGE (OUTPATIENT)
Dept: NURSING | Facility: CLINIC | Age: 27
End: 2017-12-20

## 2017-12-21 ENCOUNTER — NURSE TRIAGE (OUTPATIENT)
Dept: NURSING | Facility: CLINIC | Age: 27
End: 2017-12-21

## 2017-12-21 NOTE — TELEPHONE ENCOUNTER
"  Reason for Disposition    [1] MODERATE headache (e.g., interferes with normal activities) AND [2] present > 24 hours AND [3] unexplained  (Exceptions: analgesics not tried, typical migraine, or headache part of viral illness)    Additional Information    Negative: Difficult to awaken or acting confused  (e.g., disoriented, slurred speech)    Negative: [1] Weakness of the face, arm or leg on one side of the body AND [2] new onset    Negative: [1] Numbness of the face, arm or leg on one side of the body AND [2] new onset    Negative: [1] Loss of speech or garbled speech AND [2] new onset    Negative: Passed out (i.e., lost consciousness, collapsed and was not responding)    Negative: Sounds like a life-threatening emergency to the triager    Negative: Unable to walk, or can only walk with assistance (e.g., requires support)    Negative: Stiff neck (can't touch chin to chest)    Negative: Severe pain in one eye    Negative: [1] Other family members (or roommates) with headaches AND [2] possibility of carbon monoxide exposure    Negative: [1] SEVERE headache (e.g., excruciating) AND [2] \"worst headache\" of life    Negative: [1] SEVERE headache AND [2] sudden-onset (i.e., reaching maximum intensity within seconds)    Negative: [1] SEVERE headache AND [2] fever    Negative: Loss of vision or double vision (Exception: same as prior migraines)    Negative: [1] Fever > 100.5 F (38.1 C) AND [2] diabetes mellitus or weak immune system (e.g., HIV positive, cancer chemo, splenectomy, organ transplant, chronic steroids)    Negative: Patient sounds very sick or weak to the triager    Negative: [1] SEVERE headache (e.g., excruciating) AND [2] not improved after 2 hours of pain medicine    Negative: [1] Vomiting AND [2] 2 or more times (Exception: similar to previous migraines)    Negative: Fever > 104 F (40 C)    Protocols used: HEADACHE-ADULT-    "

## 2017-12-22 ENCOUNTER — APPOINTMENT (OUTPATIENT)
Dept: CT IMAGING | Facility: CLINIC | Age: 27
End: 2017-12-22
Attending: EMERGENCY MEDICINE
Payer: MEDICARE

## 2017-12-22 ENCOUNTER — APPOINTMENT (OUTPATIENT)
Dept: GENERAL RADIOLOGY | Facility: CLINIC | Age: 27
End: 2017-12-22
Attending: EMERGENCY MEDICINE
Payer: MEDICARE

## 2017-12-22 ENCOUNTER — HOSPITAL ENCOUNTER (OUTPATIENT)
Facility: CLINIC | Age: 27
Setting detail: OBSERVATION
Discharge: HOME | End: 2017-12-23
Attending: EMERGENCY MEDICINE | Admitting: INTERNAL MEDICINE
Payer: MEDICARE

## 2017-12-22 DIAGNOSIS — T18.2XXA GASTRIC FOREIGN BODY, INITIAL ENCOUNTER: ICD-10-CM

## 2017-12-22 LAB
ANION GAP SERPL CALCULATED.3IONS-SCNC: 9 MMOL/L (ref 3–14)
BASOPHILS # BLD AUTO: 0 10E9/L (ref 0–0.2)
BASOPHILS NFR BLD AUTO: 0.4 %
BUN SERPL-MCNC: 10 MG/DL (ref 7–30)
CALCIUM SERPL-MCNC: 9.1 MG/DL (ref 8.5–10.1)
CHLORIDE SERPL-SCNC: 105 MMOL/L (ref 94–109)
CO2 SERPL-SCNC: 27 MMOL/L (ref 20–32)
CREAT SERPL-MCNC: 0.61 MG/DL (ref 0.52–1.04)
DIFFERENTIAL METHOD BLD: NORMAL
EOSINOPHIL # BLD AUTO: 0.2 10E9/L (ref 0–0.7)
EOSINOPHIL NFR BLD AUTO: 1.9 %
ERYTHROCYTE [DISTWIDTH] IN BLOOD BY AUTOMATED COUNT: 14.4 % (ref 10–15)
GFR SERPL CREATININE-BSD FRML MDRD: >90 ML/MIN/1.7M2
GLUCOSE SERPL-MCNC: 97 MG/DL (ref 70–99)
HCG SERPL QL: NEGATIVE
HCT VFR BLD AUTO: 36.8 % (ref 35–47)
HGB BLD-MCNC: 12.1 G/DL (ref 11.7–15.7)
IMM GRANULOCYTES # BLD: 0 10E9/L (ref 0–0.4)
IMM GRANULOCYTES NFR BLD: 0.4 %
LYMPHOCYTES # BLD AUTO: 1.4 10E9/L (ref 0.8–5.3)
LYMPHOCYTES NFR BLD AUTO: 16.3 %
MCH RBC QN AUTO: 28.5 PG (ref 26.5–33)
MCHC RBC AUTO-ENTMCNC: 32.9 G/DL (ref 31.5–36.5)
MCV RBC AUTO: 87 FL (ref 78–100)
MONOCYTES # BLD AUTO: 0.5 10E9/L (ref 0–1.3)
MONOCYTES NFR BLD AUTO: 5.9 %
NEUTROPHILS # BLD AUTO: 6.4 10E9/L (ref 1.6–8.3)
NEUTROPHILS NFR BLD AUTO: 75.1 %
NRBC # BLD AUTO: 0 10*3/UL
NRBC BLD AUTO-RTO: 0 /100
PLATELET # BLD AUTO: 278 10E9/L (ref 150–450)
POTASSIUM SERPL-SCNC: 3.7 MMOL/L (ref 3.4–5.3)
RBC # BLD AUTO: 4.24 10E12/L (ref 3.8–5.2)
SODIUM SERPL-SCNC: 141 MMOL/L (ref 133–144)
WBC # BLD AUTO: 8.5 10E9/L (ref 4–11)

## 2017-12-22 PROCEDURE — A9270 NON-COVERED ITEM OR SERVICE: HCPCS | Mod: GY | Performed by: INTERNAL MEDICINE

## 2017-12-22 PROCEDURE — 80048 BASIC METABOLIC PNL TOTAL CA: CPT | Performed by: EMERGENCY MEDICINE

## 2017-12-22 PROCEDURE — 99285 EMERGENCY DEPT VISIT HI MDM: CPT | Mod: 25

## 2017-12-22 PROCEDURE — 96375 TX/PRO/DX INJ NEW DRUG ADDON: CPT

## 2017-12-22 PROCEDURE — 25000132 ZZH RX MED GY IP 250 OP 250 PS 637: Mod: GY | Performed by: INTERNAL MEDICINE

## 2017-12-22 PROCEDURE — 74000 XR ABDOMEN 1 VW: CPT

## 2017-12-22 PROCEDURE — 99219 ZZC INITIAL OBSERVATION CARE,LEVL II: CPT | Performed by: INTERNAL MEDICINE

## 2017-12-22 PROCEDURE — 25000128 H RX IP 250 OP 636: Performed by: EMERGENCY MEDICINE

## 2017-12-22 PROCEDURE — 96361 HYDRATE IV INFUSION ADD-ON: CPT

## 2017-12-22 PROCEDURE — 74176 CT ABD & PELVIS W/O CONTRAST: CPT

## 2017-12-22 PROCEDURE — G0378 HOSPITAL OBSERVATION PER HR: HCPCS

## 2017-12-22 PROCEDURE — 25000128 H RX IP 250 OP 636: Performed by: INTERNAL MEDICINE

## 2017-12-22 PROCEDURE — 84703 CHORIONIC GONADOTROPIN ASSAY: CPT | Performed by: EMERGENCY MEDICINE

## 2017-12-22 PROCEDURE — 25000125 ZZHC RX 250: Performed by: EMERGENCY MEDICINE

## 2017-12-22 PROCEDURE — 96374 THER/PROPH/DIAG INJ IV PUSH: CPT

## 2017-12-22 PROCEDURE — 85025 COMPLETE CBC W/AUTO DIFF WBC: CPT | Performed by: EMERGENCY MEDICINE

## 2017-12-22 RX ORDER — ACETAMINOPHEN 10 MG/ML
1000 INJECTION, SOLUTION INTRAVENOUS EVERY 6 HOURS PRN
Status: DISCONTINUED | OUTPATIENT
Start: 2017-12-22 | End: 2017-12-23

## 2017-12-22 RX ORDER — ARIPIPRAZOLE 10 MG/1
15 TABLET ORAL EVERY MORNING
Status: ON HOLD | COMMUNITY
End: 2018-02-13

## 2017-12-22 RX ORDER — ARIPIPRAZOLE 10 MG/1
10 TABLET ORAL EVERY MORNING
Status: DISCONTINUED | OUTPATIENT
Start: 2017-12-23 | End: 2017-12-23 | Stop reason: HOSPADM

## 2017-12-22 RX ORDER — DESVENLAFAXINE 100 MG/1
100 TABLET, EXTENDED RELEASE ORAL EVERY MORNING
Status: DISCONTINUED | OUTPATIENT
Start: 2017-12-23 | End: 2017-12-23 | Stop reason: HOSPADM

## 2017-12-22 RX ORDER — LORAZEPAM 2 MG/ML
0.5 INJECTION INTRAMUSCULAR EVERY 6 HOURS PRN
Status: DISCONTINUED | OUTPATIENT
Start: 2017-12-22 | End: 2017-12-23

## 2017-12-22 RX ORDER — PROCHLORPERAZINE 25 MG
25 SUPPOSITORY, RECTAL RECTAL EVERY 12 HOURS PRN
Status: DISCONTINUED | OUTPATIENT
Start: 2017-12-22 | End: 2017-12-23

## 2017-12-22 RX ORDER — ACETAMINOPHEN 650 MG/1
650 SUPPOSITORY RECTAL EVERY 4 HOURS PRN
Status: DISCONTINUED | OUTPATIENT
Start: 2017-12-22 | End: 2017-12-23 | Stop reason: HOSPADM

## 2017-12-22 RX ORDER — SODIUM CHLORIDE 9 MG/ML
INJECTION, SOLUTION INTRAVENOUS CONTINUOUS
Status: DISCONTINUED | OUTPATIENT
Start: 2017-12-22 | End: 2017-12-23

## 2017-12-22 RX ORDER — PROMETHAZINE HYDROCHLORIDE 25 MG/ML
12.5 INJECTION, SOLUTION INTRAMUSCULAR; INTRAVENOUS ONCE
Status: COMPLETED | OUTPATIENT
Start: 2017-12-22 | End: 2017-12-22

## 2017-12-22 RX ORDER — TRAZODONE HYDROCHLORIDE 50 MG/1
50 TABLET, FILM COATED ORAL
Status: DISCONTINUED | OUTPATIENT
Start: 2017-12-22 | End: 2017-12-23 | Stop reason: HOSPADM

## 2017-12-22 RX ORDER — PROCHLORPERAZINE MALEATE 5 MG
10 TABLET ORAL EVERY 6 HOURS PRN
Status: DISCONTINUED | OUTPATIENT
Start: 2017-12-22 | End: 2017-12-23

## 2017-12-22 RX ORDER — ACETAMINOPHEN 325 MG/1
650 TABLET ORAL EVERY 4 HOURS PRN
Status: DISCONTINUED | OUTPATIENT
Start: 2017-12-22 | End: 2017-12-23 | Stop reason: HOSPADM

## 2017-12-22 RX ORDER — SODIUM CHLORIDE 9 MG/ML
1000 INJECTION, SOLUTION INTRAVENOUS CONTINUOUS
Status: DISCONTINUED | OUTPATIENT
Start: 2017-12-22 | End: 2017-12-22

## 2017-12-22 RX ORDER — LORAZEPAM 2 MG/ML
1 INJECTION INTRAMUSCULAR ONCE
Status: DISCONTINUED | OUTPATIENT
Start: 2017-12-22 | End: 2017-12-22

## 2017-12-22 RX ORDER — NALOXONE HYDROCHLORIDE 0.4 MG/ML
.1-.4 INJECTION, SOLUTION INTRAMUSCULAR; INTRAVENOUS; SUBCUTANEOUS
Status: DISCONTINUED | OUTPATIENT
Start: 2017-12-22 | End: 2017-12-23 | Stop reason: HOSPADM

## 2017-12-22 RX ORDER — ALBUTEROL SULFATE 0.83 MG/ML
2.5 SOLUTION RESPIRATORY (INHALATION)
Status: DISCONTINUED | OUTPATIENT
Start: 2017-12-22 | End: 2017-12-23 | Stop reason: HOSPADM

## 2017-12-22 RX ORDER — KETOROLAC TROMETHAMINE 30 MG/ML
15 INJECTION, SOLUTION INTRAMUSCULAR; INTRAVENOUS EVERY 6 HOURS PRN
Status: DISCONTINUED | OUTPATIENT
Start: 2017-12-22 | End: 2017-12-23

## 2017-12-22 RX ORDER — ONDANSETRON 4 MG/1
4 TABLET, ORALLY DISINTEGRATING ORAL EVERY 6 HOURS PRN
Status: DISCONTINUED | OUTPATIENT
Start: 2017-12-22 | End: 2017-12-23

## 2017-12-22 RX ORDER — HYDROXYZINE HYDROCHLORIDE 25 MG/1
25-50 TABLET, FILM COATED ORAL EVERY 6 HOURS PRN
Status: DISCONTINUED | OUTPATIENT
Start: 2017-12-22 | End: 2017-12-23

## 2017-12-22 RX ORDER — DIPHENHYDRAMINE HYDROCHLORIDE 50 MG/ML
25 INJECTION INTRAMUSCULAR; INTRAVENOUS ONCE
Status: COMPLETED | OUTPATIENT
Start: 2017-12-22 | End: 2017-12-22

## 2017-12-22 RX ORDER — ONDANSETRON 2 MG/ML
4 INJECTION INTRAMUSCULAR; INTRAVENOUS EVERY 6 HOURS PRN
Status: DISCONTINUED | OUTPATIENT
Start: 2017-12-22 | End: 2017-12-23

## 2017-12-22 RX ADMIN — SODIUM CHLORIDE 1000 ML: 9 INJECTION, SOLUTION INTRAVENOUS at 16:31

## 2017-12-22 RX ADMIN — DIPHENHYDRAMINE HYDROCHLORIDE 25 MG: 50 INJECTION, SOLUTION INTRAMUSCULAR; INTRAVENOUS at 16:40

## 2017-12-22 RX ADMIN — PANTOPRAZOLE SODIUM 40 MG: 40 INJECTION, POWDER, FOR SOLUTION INTRAVENOUS at 16:40

## 2017-12-22 RX ADMIN — PROCHLORPERAZINE MALEATE 10 MG: 5 TABLET, FILM COATED ORAL at 22:58

## 2017-12-22 RX ADMIN — PROMETHAZINE HYDROCHLORIDE 12.5 MG: 25 INJECTION INTRAMUSCULAR; INTRAVENOUS at 18:39

## 2017-12-22 RX ADMIN — TRAZODONE HYDROCHLORIDE 50 MG: 50 TABLET ORAL at 22:58

## 2017-12-22 RX ADMIN — HYDROMORPHONE HYDROCHLORIDE 1 MG: 1 INJECTION, SOLUTION INTRAMUSCULAR; INTRAVENOUS; SUBCUTANEOUS at 18:39

## 2017-12-22 RX ADMIN — ONDANSETRON 4 MG: 2 INJECTION INTRAMUSCULAR; INTRAVENOUS at 21:17

## 2017-12-22 RX ADMIN — LORAZEPAM 0.5 MG: 2 INJECTION, SOLUTION INTRAMUSCULAR; INTRAVENOUS at 21:18

## 2017-12-22 ASSESSMENT — ENCOUNTER SYMPTOMS
NERVOUS/ANXIOUS: 1
APPETITE CHANGE: 0
ABDOMINAL PAIN: 1
NAUSEA: 1
DIARRHEA: 0
VOMITING: 1
CONSTIPATION: 0

## 2017-12-22 NOTE — ED NOTES
"ABC's intact.  Alert and oriented x4.    Pt states she swallowed 2 earrings without cover and 6 inch spring yesterday.  Pt has a history of this she is \"working on with my therapist.\"  Pt at [facility] yesterday who imaged it and stated it should pass.  Pt c/o sharp upper abd pain since this occurred and today began having emesis.  Abd soft but tender in epigastric area.  "
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
## 2017-12-22 NOTE — IP AVS SNAPSHOT
[facility] [id number] [facility]    [address]    [facility] [address]    Phone:  [phone]    Fax:  [phone]                                       After Visit Summary   12/22/2017    Nevin Alvarado    MRN: [id number]           After Visit Summary Signature Page     I have received my discharge instructions, and my questions have been answered. I have discussed any challenges I see with this plan with the nurse or doctor.    ..........................................................................................................................................  Patient/Patient Representative Signature      ..........................................................................................................................................  Patient Representative Print Name and Relationship to Patient    ..................................................               ................................................  Date                                            Time    ..........................................................................................................................................  Reviewed by Signature/Title    ...................................................              ..............................................  Date                                                            Time

## 2017-12-22 NOTE — IP AVS SNAPSHOT
MRN:[id number]                      After Visit Summary   12/22/2017    Nevin Alvarado    MRN: [id number]           Thank you!     Thank you for choosing [facility] for your care. Our goal is always to provide you with excellent care. Hearing back from our patients is one way we can continue to improve our services. Please take a few minutes to complete the written survey that you may receive in the mail after you visit. If you would like to speak to someone directly about your visit please contact Patient Relations at [phone]. Thank you!          Patient Information     Date Of Birth          1990        About your hospital stay     You were admitted on:  December 22, 2017 You last received care in the:  [facility] Medical Surgical    You were discharged on:  December 23, 2017       Who to Call     For medical emergencies, please call 911.  For non-urgent questions about your medical care, please call your primary care provider or clinic, [phone]  For questions related to your surgery, please call your surgery clinic        Attending Provider     Provider Specialty    Mariam Sheehan MD Emergency Medicine    Farzad Black, DO Internal Medicine       Primary Care Provider Office Phone # Fax #    Sandra Ramos [phone]      After Care Instructions     Activity       Your activity upon discharge: activity as tolerated            Diet       Follow this diet upon discharge: Regular                  Follow-up Appointments     Follow-up and recommended labs and tests        Follow up with primary care provider, Sandra Ramos, within 7 days for hospital follow- up.  No follow up labs or test are needed.                  Your next 10 appointments already scheduled     Dec 27, 2017  1:00 PM CST   Office Visit with Sandra Ramos MD   [facility]    [address] [place] [address]  [facility] [address] [phone]           Bring a current list of meds and any records pertaining to this visit. For Physicals, please bring immunization records and any forms needing to be filled out. Please arrive 10 minutes early to complete paperwork.              Further instructions from your care team       The patient has received a copy of the Provation  report the doctor has written and discharge instructions have been discussed with the patient and responsible adult.  All questions were addressed and answered prior to patient discharge.                         Post-Procedure Discharge Instructions    You just had a gastroscopy. Your follow-up instructions are indicated below:    * You may not drive, use heavy equipment or consume alcohol for 24 hours because the drugs you were given may cause dizziness, drowsiness, forgetfulness and slower reaction time.     * Small pieces or tissue (biopsies) or polyps may have been removed.     * You may resume your regular diet and medications , except aspirin and ibuprofen products for the next 10 days if biopsy or polyps removed. Tylenol is safe.    Additional Instructions:   We are waiting for your pathology results. If you have not heard the results in 10 days call the office for the results.     What to watch for:    Problems rarely occur after the exam. It is important for you to be aware of the early signs of a possible complication. Call immediately if you notice any of the followin. Unusual pain or difficulty swallowing.   2.Unusual abdominal or chest pain.  3.Vomitting of blood.  4.Black or bloody stools.   5. Temperature above 100.6 degrees.       Responsible Adults Signature______________________    Relationship to Patient____________________________    Nurses Signature:________________________________  If you have any questions contact your physician.       Pending Results     No orders found for last 3 day(s).            Statement  "of Approval     Ordered          12/23/17 4305  I have reviewed and agree with all the recommendations and orders detailed in this document.  EFFECTIVE NOW     Approved and electronically signed by:  Farzad Black DO             Admission Information     Date & Time Provider Department Dept. Phone    12/22/2017 Farzad Black,  [place] [address] Medical Surgical [phone]      Your Vitals Were     Blood Pressure Pulse Temperature Respirations Height Weight    139/78 91 97.2  F (36.2  C) (Oral) 16 1.549 m (5' 1\") 109.8 kg (242 lb)    Pulse Oximetry BMI (Body Mass Index)                97% 45.73 kg/m2          MyChart Information     [facility] gives you secure access to your electronic health record. If you see a primary care provider, you can also send messages to your care team and make appointments. If you have questions, please call your primary care clinic.  If you do not have a primary care provider, please call [phone] and they will assist you.        Care EveryWhere ID     This is your Care EveryWhere ID. This could be used by other organizations to access your [facility] medical records  [id number]        Equal Access to Services     ZENON DIAMOND : Hadii zaire Collado, warangelda samantha, qaybta benoitaljana tenorio, mic persaud. So [facility] [phone].    ATENCIÓN: Si habla español, tiene a singh disposición servicios gratuitos de asistencia lingüística. Dylon al [phone].    We comply with applicable federal civil rights laws and [place] laws. We do not discriminate on the basis of race, color, national origin, age, disability, sex, sexual orientation, or gender identity.               Review of your medicines      CONTINUE these medicines which have NOT CHANGED        Dose / Directions    ABILIFY 10 MG tablet   Generic drug:  ARIPiprazole        Dose:  10 mg   Take 10 mg by mouth every morning   Refills:  0       acetaminophen 500 MG tablet   Commonly " known as:  TYLENOL   Used for:  Moderate pain        Dose:  500-1000 mg   Take 1-2 tablets (500-1,000 mg) by mouth every 8 hours as needed for mild pain   Quantity:  90 tablet   Refills:  3       LAMICTAL 100 MG tablet   Generic drug:  lamoTRIgine        Dose:  150 mg   Take 150 mg by mouth every morning   Refills:  0       levonorgestrel 20 MCG/24HR IUD   Commonly known as:  MIRENA        Dose:  1 each   1 each (20 mcg) by Intrauterine route once for 1 dose   Refills:  0       PRISTIQ PO        Dose:  100 mg   Take 100 mg by mouth every morning   Refills:  0       TRAZODONE HCL PO        Dose:  50 mg   Take 50 mg by mouth nightly as needed for sleep   Refills:  0       VITAMIN D3 PO        Dose:  5000 Units   Take 5,000 Units by mouth every morning   Refills:  0                Protect others around you: Learn how to safely use, store and throw away your medicines at www.disposemymeds.org.             Medication List: This is a list of all your medications and when to take them. Check marks below indicate your daily home schedule. Keep this list as a reference.      Medications           Morning Afternoon Evening Bedtime As Needed    ABILIFY 10 MG tablet   Take 10 mg by mouth every morning   Last time this was given:  10 mg on 12/23/2017  8:30 AM   Generic drug:  ARIPiprazole                                acetaminophen 500 MG tablet   Commonly known as:  TYLENOL   Take 1-2 tablets (500-1,000 mg) by mouth every 8 hours as needed for mild pain                                LAMICTAL 100 MG tablet   Take 150 mg by mouth every morning   Last time this was given:  150 mg on 12/23/2017  8:30 AM   Generic drug:  lamoTRIgine                                levonorgestrel 20 MCG/24HR IUD   Commonly known as:  MIRENA   1 each (20 mcg) by Intrauterine route once for 1 dose                                PRISTIQ PO   Take 100 mg by mouth every morning   Last time this was given:  100 mg on 12/23/2017  8:30 AM                                 TRAZODONE HCL PO   Take 50 mg by mouth nightly as needed for sleep   Last time this was given:  50 mg on 12/22/2017 10:58 PM                                VITAMIN D3 PO   Take 5,000 Units by mouth every morning

## 2017-12-22 NOTE — TELEPHONE ENCOUNTER
"C/O upper abdominal pain for the past few days.   Pain is constant, worse after eating, sharp, then goes to dull.   Pain started out in the left upper quadrant and now is \"all across the top.\"  Rates the pain currently 5/10, states that the pain never completely goes away.   Nauseated, no vomiting  Denies any blood in the stool.   Denies having swallowed any foreign body.   Denies fever.    Patient states that she has been seen in 2 ER's over this past week. At [facility] and [facility].  Was advised that her pain is due to \"stress' and to take Tylenol.    Patient states she has been taking Tylenol and ibuprofen without any change in the pain.    Protocol and care advice reviewed.   Patient states understanding of the recommended disposition.  States that she will probably go to [facility] for eval.   Advised to call back if further questions or concerns.     Reason for Disposition    [1] MILD-MODERATE pain AND [2] constant AND [3] present > 2 hours    Additional Information    Negative: Shock suspected (e.g., cold/pale/clammy skin, too weak to stand, low BP, rapid pulse)    Negative: Difficult to awaken or acting confused  (e.g., disoriented, slurred speech)    Negative: Passed out (i.e., lost consciousness, collapsed and was not responding)    Negative: Sounds like a life-threatening emergency to the triager    Negative: [1] SEVERE pain (e.g., excruciating) AND [2] present > 1 hour    Negative: [1] SEVERE pain AND [2] age > 60    Negative: [1] Vomiting AND [2] contains red blood or black (\"coffee ground\") material  (Exception: few red streaks in vomit that only happened once)    Negative: Blood in bowel movements   (Exception: blood on surface of BM with constipation)    Negative: Black or tarry bowel movements  (Exception: chronic-unchanged  black-grey bowel movements AND is taking iron pills or Pepto-bismol)    Negative: Patient sounds very sick or weak to the triager    Protocols used: ABDOMINAL PAIN - " FEMALE-ADULT-AH

## 2017-12-23 ENCOUNTER — SURGERY (OUTPATIENT)
Age: 27
End: 2017-12-23

## 2017-12-23 VITALS
SYSTOLIC BLOOD PRESSURE: 139 MMHG | TEMPERATURE: 97.2 F | HEIGHT: 61 IN | WEIGHT: 242 LBS | OXYGEN SATURATION: 97 % | DIASTOLIC BLOOD PRESSURE: 78 MMHG | HEART RATE: 91 BPM | BODY MASS INDEX: 45.69 KG/M2 | RESPIRATION RATE: 16 BRPM

## 2017-12-23 LAB — UPPER GI ENDOSCOPY: NORMAL

## 2017-12-23 PROCEDURE — G0378 HOSPITAL OBSERVATION PER HR: HCPCS

## 2017-12-23 PROCEDURE — 45379 COLONOSCOPY W/FB REMOVAL: CPT | Performed by: INTERNAL MEDICINE

## 2017-12-23 PROCEDURE — A9270 NON-COVERED ITEM OR SERVICE: HCPCS | Mod: GY | Performed by: INTERNAL MEDICINE

## 2017-12-23 PROCEDURE — 25000132 ZZH RX MED GY IP 250 OP 250 PS 637: Mod: GY | Performed by: INTERNAL MEDICINE

## 2017-12-23 PROCEDURE — 25000128 H RX IP 250 OP 636: Performed by: INTERNAL MEDICINE

## 2017-12-23 PROCEDURE — 99217 ZZC OBSERVATION CARE DISCHARGE: CPT | Performed by: INTERNAL MEDICINE

## 2017-12-23 PROCEDURE — 40000104 ZZH STATISTIC MODERATE SEDATION < 10 MIN: Performed by: INTERNAL MEDICINE

## 2017-12-23 PROCEDURE — 25000125 ZZHC RX 250: Performed by: INTERNAL MEDICINE

## 2017-12-23 RX ORDER — LIDOCAINE 40 MG/G
CREAM TOPICAL
Status: DISCONTINUED | OUTPATIENT
Start: 2017-12-23 | End: 2017-12-23 | Stop reason: HOSPADM

## 2017-12-23 RX ORDER — NALOXONE HYDROCHLORIDE 0.4 MG/ML
.1-.4 INJECTION, SOLUTION INTRAMUSCULAR; INTRAVENOUS; SUBCUTANEOUS
Status: DISCONTINUED | OUTPATIENT
Start: 2017-12-23 | End: 2017-12-23 | Stop reason: HOSPADM

## 2017-12-23 RX ORDER — FLUMAZENIL 0.1 MG/ML
0.2 INJECTION, SOLUTION INTRAVENOUS
Status: DISCONTINUED | OUTPATIENT
Start: 2017-12-23 | End: 2017-12-23 | Stop reason: HOSPADM

## 2017-12-23 RX ORDER — FENTANYL CITRATE 50 UG/ML
INJECTION, SOLUTION INTRAMUSCULAR; INTRAVENOUS PRN
Status: DISCONTINUED | OUTPATIENT
Start: 2017-12-23 | End: 2017-12-23 | Stop reason: HOSPADM

## 2017-12-23 RX ADMIN — SODIUM CHLORIDE: 9 INJECTION, SOLUTION INTRAVENOUS at 02:27

## 2017-12-23 RX ADMIN — LORAZEPAM 0.5 MG: 2 INJECTION, SOLUTION INTRAMUSCULAR; INTRAVENOUS at 08:37

## 2017-12-23 RX ADMIN — MIDAZOLAM 1 MG: 1 INJECTION INTRAMUSCULAR; INTRAVENOUS at 15:56

## 2017-12-23 RX ADMIN — LAMOTRIGINE 150 MG: 25 TABLET ORAL at 08:30

## 2017-12-23 RX ADMIN — ARIPIPRAZOLE 10 MG: 10 TABLET ORAL at 08:30

## 2017-12-23 RX ADMIN — ONDANSETRON 4 MG: 2 INJECTION INTRAMUSCULAR; INTRAVENOUS at 12:41

## 2017-12-23 RX ADMIN — KETOROLAC TROMETHAMINE 15 MG: 30 INJECTION, SOLUTION INTRAMUSCULAR at 02:33

## 2017-12-23 RX ADMIN — FENTANYL CITRATE 100 MCG: 50 INJECTION, SOLUTION INTRAMUSCULAR; INTRAVENOUS at 15:53

## 2017-12-23 RX ADMIN — MIDAZOLAM 2 MG: 1 INJECTION INTRAMUSCULAR; INTRAVENOUS at 15:53

## 2017-12-23 RX ADMIN — SODIUM CHLORIDE: 9 INJECTION, SOLUTION INTRAVENOUS at 12:43

## 2017-12-23 RX ADMIN — MIDAZOLAM 1 MG: 1 INJECTION INTRAMUSCULAR; INTRAVENOUS at 15:55

## 2017-12-23 RX ADMIN — DESVENLAFAXINE SUCCINATE 100 MG: 100 TABLET, EXTENDED RELEASE ORAL at 08:30

## 2017-12-23 RX ADMIN — BENZOCAINE 1 APPLICATOR: 220 SPRAY, METERED PERIODONTAL at 15:51

## 2017-12-23 RX ADMIN — ONDANSETRON 4 MG: 2 INJECTION INTRAMUSCULAR; INTRAVENOUS at 04:53
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
## 2017-12-23 NOTE — OR NURSING
Patient tolerated the procedure well. Report called to patient primary rn. krystle patient primary rn if patient dischrged to day her mother need to sign the endo dc paper work.patient transferred to 3 ed floor in a stable condition.

## 2017-12-23 NOTE — H&P
[facility]  Hospitalist Admission Note    Name: Nevin Alvarado      MRN: [id number]  YOB: 1990    Age: 27 year old  Date of admission: 12/22/2017  Primary care provider: Sandra Ramos            Assessment and Plan:   Nevin Alvarado is a 27 year old female with a history of depression who presents with ingested foreign body    1. Ingested foreign body.  GI consult.  NPO after midnight.  IV fluids.  Pain meds PRN.  Should not have opiates with her history of allergies and harmful behavior.  Psych consult.  Sitter.  Nausea meds PRN.    2.  Borderline personality disorder.  Psych consult.    Code status: Full  Admit to Observation  Prophylaxis: Ambulate              Chief Complaint:   Swallowed items.         History of Present Illness:   Nevin Alvarado is a 27 year old female who presents with ingested foreign bodies.  She swallowed two ear rings and the metal spring from the interior of a pen yesterday.  Presented to outside ER yesterday.  Continues having abdominal pain, nausea, and vomiting today.  No blood in emesis.  Felt feverish and sweaty at times.  Pain meds at home were not helping.  No other complaints.            Past Medical History:     Past Medical History:   Diagnosis Date     ADD (attention deficit disorder)      Anorexia nervosa with bulimia     history of; on Topamax     Anxiety      Borderline personality disorder      Depression      H/O self-harm      H/O swallowed foreign body     Recurrent issue     Lives in independent group home     due to debilitating mental illness     Migraine without aura     no known triggers; on Topamax bid and Imitrex PRN     Morbid obesity (H)      PTSD (post-traumatic stress disorder)      Rectal foreign body     Recurrent issue             Past Surgical History:     Past Surgical History:   Procedure Laterality Date     ESOPHAGOSCOPY, GASTROSCOPY, DUODENOSCOPY (EGD), COMBINED N/A 3/9/2017    Procedure: COMBINED  ESOPHAGOSCOPY, GASTROSCOPY, DUODENOSCOPY (EGD), REMOVE FOREIGN BODY;  Surgeon: Avis Guzmán MD;  Location: [facility] OR     ESOPHAGOSCOPY, GASTROSCOPY, DUODENOSCOPY (EGD), COMBINED N/A 4/20/2017    Procedure: COMBINED ESOPHAGOSCOPY, GASTROSCOPY, DUODENOSCOPY (EGD), REMOVE FOREIGN BODY;  EGD removal Foregin body;  Surgeon: Lokesh Paula MD;  Location: [facility] OR     ESOPHAGOSCOPY, GASTROSCOPY, DUODENOSCOPY (EGD), COMBINED N/A 6/12/2017    Procedure: COMBINED ESOPHAGOSCOPY, GASTROSCOPY, DUODENOSCOPY (EGD);  COMBINED ESOPHAGOSCOPY, GASTROSCOPY, DUODENOSCOPY (EGD) [[id number]]attempted removal of foreign body;  Surgeon: Pamela Perez MD;  Location: [facility] OR     ESOPHAGOSCOPY, GASTROSCOPY, DUODENOSCOPY (EGD), COMBINED N/A 6/9/2017    Procedure: COMBINED ESOPHAGOSCOPY, GASTROSCOPY, DUODENOSCOPY (EGD), REMOVE FOREIGN BODY;  Esophagoscopy, Gastroscopy, Duodenoscopy, Removal of Foreign Body;  Surgeon: Dejon Marsh MD;  Location: [facility] OR     EXAM UNDER ANESTHESIA ANUS N/A 1/10/2017    Procedure: EXAM UNDER ANESTHESIA ANUS;  Surgeon: Annmarie Haynes MD;  Location: [facility] OR     HC REMOVE FECAL IMPACTION OR FB W ANESTHESIA N/A 12/18/2016    Procedure: REMOVE FECAL IMPACTION/FOREIGN BODY UNDER ANESTHESIA;  Surgeon: Soham Cano MD;  Location: [facility] OR     KNEE SURGERY Right     removed a small tissue mass from knee     LAPAROSCOPIC ABLATION ENDOMETRIOSIS       LAPAROTOMY EXPLORATORY N/A 1/10/2017    Procedure: LAPAROTOMY EXPLORATORY;  Surgeon: Annmarie Haynes MD;  Location: [facility] OR     lymph nodes removed from neck; benign  age 7     MAMMOPLASTY REDUCTION Bilateral      RELEASE CARPAL TUNNEL Bilateral      SIGMOIDOSCOPY FLEXIBLE N/A 1/10/2017    Procedure: SIGMOIDOSCOPY FLEXIBLE;  Surgeon: Annmarie Haynes MD;  Location: [facility] OR             Social History:     Social History   Substance Use Topics     Smoking status: Never Smoker     Smokeless tobacco: Never Used     Alcohol  use No             Family History:   The family history was fully reviewed and non-contributory in this case.         Allergies:     Allergies   Allergen Reactions     Penicillins Anaphylaxis     Augmentin Swelling     Blood-Group Specific Substance Other (See Comments)     Patient has an anti-Cw and non-specific antibodies. Blood product orders may be delayed. Draw one red top and two purple top tubes for all type/screen/crossmatch orders.     Hydrocodone Nausea and Vomiting     vomiting for days     Influenza Vaccines Other (See Comments)     Oseltamivir Hives     med stopped, PN: med stopped     Vicodin [Hydrocodone-Acetaminophen] Nausea and Vomiting     Cefazolin Rash     Cephalosporins Rash             Medications:     Prior to Admission medications    Medication Sig Last Dose Taking? Auth Provider   ARIPiprazole (ABILIFY) 10 MG tablet Take 10 mg by mouth every morning 12/22/2017 at am Yes Unknown, Entered By History   TRAZODONE HCL PO Take 50 mg by mouth nightly as needed for sleep Past Week at Unknown time Yes Unknown, Entered By History   lamoTRIgine (LAMICTAL) 100 MG tablet Take 150 mg by mouth every morning  12/22/2017 at am Yes Kodi Cage MD   acetaminophen (TYLENOL) 500 MG tablet Take 1-2 tablets (500-1,000 mg) by mouth every 8 hours as needed for mild pain  Yes Sandra Ramos MD   levonorgestrel (MIRENA) 20 MCG/24HR IUD 1 each (20 mcg) by Intrauterine route once for 1 dose active Yes Sandra Ramos MD   Desvenlafaxine Succinate (PRISTIQ PO) Take 100 mg by mouth every morning  12/22/2017 at am Yes Reported, Patient   Cholecalciferol (VITAMIN D3 PO) Take 5,000 Units by mouth every morning  12/22/2017 at am Yes Reported, Patient             Review of Systems:   A Comprehensive greater than 10 system review of systems was carried out.  Pertinent positives and negatives are noted above.  Otherwise negative for contributory information.           Physical Exam:   Blood pressure (!) 157/95,  pulse 94, temperature 97.8  F (36.6  C), temperature source Temporal, resp. rate 20, SpO2 92 %, not currently breastfeeding.  Wt Readings from Last 1 Encounters:   11/05/17 104.3 kg (230 lb)     Exam:  GENERAL: No apparent distress. Awake, alert, and fully oriented.  HEENT: Normocephalic, atraumatic. Extraocular movements intact.  CARDIOVASCULAR: Regular rate and rhythm without murmurs or rubs. No S3.  PULMONARY: Clear to auscultation bilaterally.  ABDOMINAL: Soft, non-tender, non-distended. Bowel sounds normoactive.   EXTREMITIES: No cyanosis or clubbing. No appreciable edema.  NEUROLOGICAL: CN 2-12 grossly intact, no focal neurological deficits.  DERMATOLOGICAL: No rash, ulcer, bruising, nor jaundice.          Data:       Laboratory:    Recent Labs  Lab 12/22/17  1625   WBC 8.5   HGB 12.1   HCT 36.8   MCV 87          Recent Labs  Lab 12/22/17  1625      POTASSIUM 3.7   CHLORIDE 105   CO2 27   ANIONGAP 9   GLC 97   BUN 10   CR 0.61   GFRESTIMATED >90   GFRESTBLACK >90   KELBY 9.1     No results for input(s): CULT in the last 168 hours.    Imaging:  Recent Results (from the past 24 hour(s))   Abdomen XR 1 vw    Narrative    ABDOMEN ONE VIEW  12/22/2017 1:28 PM    HISTORY:  Abdominal pain.     COMPARISON:  9/27/2017.      Impression    IMPRESSION:  Bowel gas pattern within normal limits. Moderate amount  of fecal material in the colon. There is an approximately 8.5 cm  coiled probably metallic structure overlying the midabdomen, unclear  if this is within or outside of the patient. There are also 2  flower-shaped structures that have the appearance of pierced earrings  overlying the mid and lower left abdomen. Correlate clinically. IUD  overlies the pelvis.     CHARLENE MARLEY MD   CT Abdomen Pelvis without Contrast (stone protocol)    Narrative    Exam: CT ABDOMEN PELVIS W/O CONTRAST  12/22/2017 5:31 PM    History: Abdominal pain, swallowed 3 foreign bodies.    Comparison: 9/19/2017    Technique:  Volumetric acquisition with reconstruction in the axial,  coronal planes through the abdomen and pelvis without contrast.  Radiation dose for this scan was reduced using automated exposure  control, adjustment of the mA and/or kV according to patient size, or  iterative reconstruction technique.    Contrast: None    Findings:   Lung Bases: Lung bases are clear. No pleural or pericardial effusion.    Abdomen: Unenhanced liver, spleen, adrenal glands, kidneys and  pancreas appear normal.    Postoperative changes metallic spring appears to be extending from the  gastric antrum through the pylorus and into the duodenal bulb (series  2 image 27), this does not appear significantly changed in position  since abdominal radiographs earlier today. Additional smaller  radiodense foreign bodies are seen in the right lower quadrant,  appearing to be within the terminal ileum (series 3 image 67 and  series 2 image 61). Of note, these 2 have progressed substantially  since abdominal radiographs on the same day at 1315 hours.    No dilated loops of bowel or areas of bowel wall thickening. Normal  appendix. Intrauterine device within the uterus. No free fluid or free  air.    Bones: No concerning lytic or sclerotic lesions.      Impression    Impression:   1. Metallic spring extends from the gastric antrum into the duodenal  bulb, this is not significantly changed in position since abdominal  radiographs on the same day at 1315 hours.  2. 2 additional radiodense foreign bodies are seen in the terminal  ileum, these are significantly progressed in position since  radiographs at 1315 hours.  3. No evidence of bowel obstruction or perforation at this time.    JANETT CARD MD

## 2017-12-23 NOTE — PLAN OF CARE
"PRIMARY DIAGNOSIS: \"GENERIC\" NURSING  OUTPATIENT/OBSERVATION GOALS TO BE MET BEFORE DISCHARGE:  1. ADLs back to baseline: No    2. Activity and level of assistance: Ambulating independently.    3. Pain status: Improved but still requiring IV narcotics.    4. Return to near baseline physical activity: No     Discharge Planner Nurse   Safe discharge environment identified: No  Barriers to discharge: No       Entered by: Solange De La Rosa 12/23/2017 4:14 AM     Please review provider order for any additional goals.   Nurse to notify provider when observation goals have been met and patient is ready for discharge.  "

## 2017-12-23 NOTE — PLAN OF CARE
"Problem: Patient Care Overview  Goal: Plan of Care/Patient Progress Review  Recd pt from obs unit for swallowing of 2 earrings and a spring. Clear liquid, NPO after midnight. PSC at bedside for pt safety. Denies that she will attempt anything similar or hurting herself. Pt crying on arrival and upset about phenergan not being ordered. Spoke with pt at length and is now calm. Medicated with lorazepam and zofran, able to take fluids. Review POC with patient.    PRIMARY DIAGNOSIS: \"GENERIC\" NURSING  OUTPATIENT/OBSERVATION GOALS TO BE MET BEFORE DISCHARGE:  1. ADLs back to baseline: No    2. Activity and level of assistance: Ambulating independently.    3. Pain status: Improved but still requiring IV narcotics.    4. Return to near baseline physical activity: No     Discharge Planner Nurse   Safe discharge environment identified: No  Barriers to discharge: Yes       Entered by: Lizz Piña 12/22/2017 10:14 PM     Please review provider order for any additional goals.   Nurse to notify provider when observation goals have been met and patient is ready for discharge.      "

## 2017-12-23 NOTE — PLAN OF CARE
"Problem: Patient Care Overview  Goal: Plan of Care/Patient Progress Review  PRIMARY DIAGNOSIS: \"GENERIC\" NURSING  OUTPATIENT/OBSERVATION GOALS TO BE MET BEFORE DISCHARGE:  1. ADLs back to baseline:yes    2. Activity and level of assistance:independent    3. Pain status:in pain    4. Return to near baseline physical activity: yes     Discharge Planner Nurse   Safe discharge environment identified:waiting for psyh consult  Barriers to discharge: Yes       Entered by: Melani Christine 12/23/2017 8:57 AM     Please review provider order for any additional goals.   Nurse to notify provider when observation goals have been met and patient is ready for discharge.      "

## 2017-12-23 NOTE — PROGRESS NOTES
RN- Reviewed DC instructions and follow up with pt and her mom. No changes to medications. Verbalizes understanding. Mother of pt signed EGD discharge sheet. Mom of pt to provide transport.

## 2017-12-23 NOTE — PLAN OF CARE
"PRIMARY DIAGNOSIS: \"GENERIC\" NURSING  OUTPATIENT/OBSERVATION GOALS TO BE MET BEFORE DISCHARGE:  1. ADLs back to baseline: Yes    2. Activity and level of assistance: Ambulating independently.    3. Pain status: Improved but still requiring IV narcotics.    4. Return to near baseline physical activity: No     Discharge Planner Nurse   Safe discharge environment identified: Yes  Barriers to discharge: No       Entered by: Solange De La Rosa 12/23/2017 4:18 AM     Please review provider order for any additional goals.   Nurse to notify provider when observation goals have been met and patient is ready for discharge.    Pt. A&Ox4. VSS, afebrile. Complaints of constant abdominal pain. PRN toradol given x1, aqua k pad applied. Complaints of nausea, PRN IV zofran given. Transferring independently in room. Clear liquid diet. NS infusing at 100 ml/hr. Plan for GI and psych consult today.   "

## 2017-12-23 NOTE — DISCHARGE SUMMARY
Discharge Summary  Hospitalist Service    Nevin Alvarado MRN# [id number]   YOB: 1990 Age: 27 year old     Date of Admission:  12/22/2017  Date of Discharge:  12/23/2017  Admitting Physician:  Farzad Black DO  Discharge Physician: Farzad Black DO  Discharging Service: Hospitalist Service     Primary Provider: Sandra Ramos  Primary Care Physician Phone Number: [phone]         Discharge Diagnoses/Problem Oriented Hospital Course (Providers):    Nevin Alvarado was admitted on 12/22/2017 by Farzad Black DO and I would refer you to their history and physical.  The following problems were addressed during her hospitalization:  1.  Ingested foreign body.  Removed with EGD by GI.  This is a recurrent issue for patient.  Seen by Psych during hospital stay.  Needs to continue to follow up with Psychiatry in outpatient setting.  2.  Borderline personality disorder.  Continue Pristiq, Lamictal, Abilify.         Code Status:      Full Code          Pending Results:        Unresulted Labs Ordered in the Past 30 Days of this Admission     No orders found for last 61 day(s).            Discharge Instructions and Follow-Up:      Follow-up Appointments     Follow-up and recommended labs and tests        Follow up with primary care provider, Sandra Ramos, within 7 days   for hospital follow- up.  No follow up labs or test are needed.                      Discharge Disposition:      Discharged to home         Discharge Medications:        Current Discharge Medication List      CONTINUE these medications which have NOT CHANGED    Details   ARIPiprazole (ABILIFY) 10 MG tablet Take 10 mg by mouth every morning      TRAZODONE HCL PO Take 50 mg by mouth nightly as needed for sleep      lamoTRIgine (LAMICTAL) 100 MG tablet Take 150 mg by mouth every morning       acetaminophen (TYLENOL) 500 MG tablet Take 1-2 tablets (500-1,000 mg) by mouth every 8 hours as needed for mild  "pain  Qty: 90 tablet, Refills: 3    Comments: Please discontinue other Tylenol prescriptions.  Associated Diagnoses: Moderate pain      levonorgestrel (MIRENA) 20 MCG/24HR IUD 1 each (20 mcg) by Intrauterine route once for 1 dose      Desvenlafaxine Succinate (PRISTIQ PO) Take 100 mg by mouth every morning       Cholecalciferol (VITAMIN D3 PO) Take 5,000 Units by mouth every morning                Allergies:         Allergies   Allergen Reactions     Penicillins Anaphylaxis     Augmentin Swelling     Blood-Group Specific Substance Other (See Comments)     Patient has an anti-Cw and non-specific antibodies. Blood product orders may be delayed. Draw one red top and two purple top tubes for all type/screen/crossmatch orders.     Hydrocodone Nausea and Vomiting     vomiting for days     Influenza Vaccines Other (See Comments)     Oseltamivir Hives     med stopped, PN: med stopped     Vicodin [Hydrocodone-Acetaminophen] Nausea and Vomiting     Cefazolin Rash     Cephalosporins Rash           Condition and Physical on Discharge:      Discharge condition: Stable   Vitals: Blood pressure 139/78, pulse 91, temperature 97.2  F (36.2  C), temperature source Oral, resp. rate 16, height 1.549 m (5' 1\"), weight 109.8 kg (242 lb), SpO2 97 %, not currently breastfeeding.     Gen:  NAD, A&Ox3.  Eyes:  PERRL, sclera anicteric.  OP:  MMM, no lesions.  Neck:  Supple.  CV:  Regular, no murmurs.  Lung:  CTA b/l, normal effort.  Ab:  +BS, soft.  Skin:  Warm, dry to touch.  No rash.  Ext:  No pitting edema LE b/l.        Discharge Time:      I spent 20 minutes with Ms. Alvarado and working on discharge on 12/23/17.        Image Results From This Hospital Stay (For Non-EPIC Providers):        Results for orders placed or performed during the hospital encounter of 12/22/17   Abdomen XR 1 vw    Narrative    ABDOMEN ONE VIEW  12/22/2017 1:28 PM    HISTORY:  Abdominal pain.     COMPARISON:  9/27/2017.      Impression    IMPRESSION:  Bowel " gas pattern within normal limits. Moderate amount  of fecal material in the colon. There is an approximately 8.5 cm  coiled probably metallic structure overlying the midabdomen, unclear  if this is within or outside of the patient. There are also 2  flower-shaped structures that have the appearance of pierced earrings  overlying the mid and lower left abdomen. Correlate clinically. IUD  overlies the pelvis.     CHARLENE MARLEY MD   CT Abdomen Pelvis without Contrast (stone protocol)    Narrative    Exam: CT ABDOMEN PELVIS W/O CONTRAST  12/22/2017 5:31 PM    History: Abdominal pain, swallowed 3 foreign bodies.    Comparison: 9/19/2017    Technique: Volumetric acquisition with reconstruction in the axial,  coronal planes through the abdomen and pelvis without contrast.  Radiation dose for this scan was reduced using automated exposure  control, adjustment of the mA and/or kV according to patient size, or  iterative reconstruction technique.    Contrast: None    Findings:   Lung Bases: Lung bases are clear. No pleural or pericardial effusion.    Abdomen: Unenhanced liver, spleen, adrenal glands, kidneys and  pancreas appear normal.    Postoperative changes metallic spring appears to be extending from the  gastric antrum through the pylorus and into the duodenal bulb (series  2 image 27), this does not appear significantly changed in position  since abdominal radiographs earlier today. Additional smaller  radiodense foreign bodies are seen in the right lower quadrant,  appearing to be within the terminal ileum (series 3 image 67 and  series 2 image 61). Of note, these 2 have progressed substantially  since abdominal radiographs on the same day at 1315 hours.    No dilated loops of bowel or areas of bowel wall thickening. Normal  appendix. Intrauterine device within the uterus. No free fluid or free  air.    Bones: No concerning lytic or sclerotic lesions.      Impression    Impression:   1. Metallic spring extends from  the gastric antrum into the duodenal  bulb, this is not significantly changed in position since abdominal  radiographs on the same day at 1315 hours.  2. 2 additional radiodense foreign bodies are seen in the terminal  ileum, these are significantly progressed in position since  radiographs at 1315 hours.  3. No evidence of bowel obstruction or perforation at this time.    JANETT CARD MD           Most Recent Lab Results In EPIC (For Non-EPIC Providers):    Most Recent 3 CBC's:  Recent Labs   Lab Test  12/22/17   1625  11/05/17   1325  09/19/17   1852   WBC  8.5  8.5  9.5   HGB  12.1  11.0*  11.7   MCV  87  88  90   PLT  278  275  306      Most Recent 3 BMP's:  Recent Labs   Lab Test  12/22/17   1625 11/18/17 11/05/17   1325  09/19/17   1852   NA  141   --   140  139   POTASSIUM  3.7  3.8  3.8  3.8   CHLORIDE  105   --   107  105   CO2  27   --   28  27   BUN  10   --   9  15   CR  0.61  0.60  0.54  0.55   ANIONGAP  9   --   5  7   KELBY  9.1   --   8.6  9.1   GLC  97  108  97  107*     Most Recent 3 Troponin's:No lab results found.  Most Recent 3 INR's:  Recent Labs   Lab Test  03/09/17   1611  01/31/15   2120  09/15/14   2012   INR  1.12  1.10  1.11     Most Recent 2 LFT's:  Recent Labs   Lab Test 11/18/17 11/05/17   1325  09/19/17   1852   AST  15  14  10   ALT  16  23  21   ALKPHOS   --   80  87   BILITOTAL   --   0.4  0.2     Most Recent Cholesterol Panel:No lab results found.  Most Recent 6 Bacteria Isolates From Any Culture (See EPIC Reports for Culture Details):  Recent Labs   Lab Test  10/18/17   1032  04/01/17   0135  01/31/15   2200  12/04/14   2038  11/30/14   1630   CULT  No beta hemolytic Streptococcus Group A isolated  No Beta Streptococcus isolated  10,000 to 50,000 colonies/mL mixed urogenital liam  Susceptibility testing not routinely done    No Beta Streptococcus isolated  10,000 to 50,000 colonies/mL mixed urogenital liam     Most Recent TSH, T4 and HgbA1c:   Recent Labs   Lab Test   11/05/17   1325   05/16/17   0334   TSH  2.32   < >  5.06*   T4   --    --   0.96    < > = values in this interval not displayed.

## 2017-12-23 NOTE — CONSULTS
"This document was created with voice recognition software.  As result, there may be errors in grammar and syntax.  Please consider this when reading this document.    Psychological consult, covering for psychiatry, ordered by Dr. Black.      This was an initial consult, which took a total of 55 minutes, with 15 minutes reviewing records, 20 minutes spent interviewing and counseling the patient, and the rest of the time spent documenting and coordinating with Dr. Black.    Summary of stay  Nevin Alvarado is a 27 year old female who presents to the ED with intentionally ingested foreign bodies.  She swallowed two ear rings and the metal spring from the interior of a pen yesterday.  Presented to outside ER yesterday.  Continues having abdominal pain, nausea, and vomiting, and came to the [facility] ED.  She has a Borderline Personality Disorder.     She is monitored with a     Reason for consult    I was asked to see Nevin to help assess her current risk factors, to help assess her current psychiatric/behavioral treatment needs, and to provide input into discharge planning.    Records reviewed   H&P done by Dr. Black, including review of systems.    Progress notes/consults: Nevin has been resting quietly in her bed, but has told staff that she is anxious about having a GI procedure that she believes was \"promised\" by ED staff, and it was her understanding that would occur this morning.    Previous treatment records:  D/C summary from [facility] dated 11-4-17 documented that she was admitted for ingestion of 2 safety pins, which were successfully removed, but ingested additional items prior to discharge.   D/C summary from [facility] dated 8-24-17.  She was admitted for treatment of a foreign object, a bottle, in her  Rectum.   D/C summary from [facility]- for psychiatric admission due to ingesting foreign objects.     Patient Report of Admission " "Events/Circumstances  Nevin reports that she has missed her outpatient individual therapy appointments for about 2 months, due to a combination of factors including moving, meetings, and hospital admissions.  She denies suicidal ideation, but readily acknowledged that she experiences daily episodes of urges to harm herself by ingesting foreign objects.  She reports that saw her therapist earlier this week, for the first time in 2 months, disclosed her problem with ingestion, and it is her understanding that they will follow-up on this in the individual therapy and also she has been referred to a higher level of care, and IOP program at [facility], which is likely a day treatment program.  She is in agreement with all this, and clearly understands that ingestion is harmful and appears to be motivated to gain control over it.    Mental and Chemical Health Treatment History    Nevin reports that she first began experiencing psychiatric/behavioral crises in 2014.  She was hospitalized for suicidal behavior in 2014, also in 2015, and reports that she has had a total of \"about 10\" psychiatric hospitalizations during this period.  She sees a psychiatrist, Hillary Winkelmann, MD, at the [facility] and [facility].  She has an upcoming appointment on 12-29-17.  She has seen Dr. Maynard for about 1 year, and finds her to be very helpful and said, \"I love her!\"  She also sees a therapist at [facility], Lizz Norman, and sees her weekly.  She finds her to be helpful.  Her treatment plan appears to be based on the DBT treatment approach, which was specifically designed to help people with Borderline Personality Disorder, and also to help them manage problems like self-injurious behavior (SIB) patterns.  As noted above, she has an intake at an IOP program at [facility], which will occur on 12-26-17, and it is her understanding that the program will provide intensive services twice per week.    Nevin has a , " likely an Adult [facility] Case Management specialist, Yajaira Neves, who is with [facility].  Nevin reports that she is on a commitment, but it is her understanding that the commitment will be discontinued next week.  Her current SIB (self-injurious behaviors) are clearly problematic, but do not support continuing the commitment.  Nevin also received services from an ILS worker, and finds this to be very helpful.    Social Background  Nevin transitioned from a supportive living residence to independent living, in an apartment, in November.  She readily acknowledged that this transition has been very hard for her, as she had staff that she could rely on at her previous residence and now she is required to function more independently.  As is typical for many people with Borderline Personality Disorder, this is extremely challenging for her as she tends to be dependent on the help from others.  She is single, and has never .  She reports that she has a history of troubled relationships with her family of origin, but recently things have been much better and she sees them about weekly, and finds them to be supportive.  She does not work, and is on disability.  She dropped out of high school in her senior year.    Behavioral Assessment  Nevin greeted me in a quiet, but friendly manner.  She interacted with me calmly, quietly, and collaboratively.  She was oriented in all spheres.  Her speech was clear, fluent, and non-pressured.  Her thought processes were goal-directed, her associations are intact, and she may no peculiar or delusional statements.  She maintained normal attention and concentration throughout the interview.  Short and long-term memory are intact.  Her fund of knowledge appears to be average for somebody who dropped out of high school, and her intelligence is estimated to be, provisionally, in the Average Range of cognitive functioning.  She reports that she has no  "history of hallucinations, and there are no indications of current problems with her basic perceptual and cognitive processes.    Insight and judgement are mixed, with a combination of significant impairment (repeated, volitional ingestion of foreign objects as a way of coping with distress and anxiety) but also significant insight into her need to resume regular therapy, even increase the level of services that she is receiving, and to work with her therapist on her SIB problem.    She described her current mood as \"tired\" and reported that she is primarily currently concerned about having the GI procedure, and was frustrated that it was her understanding that would occur this morning and it is almost noon and is not happening.  She was, however, receptive to my coaching that this is an opportunity to use her DBT skills, such as self-soothing and being patient about things that are beyond her control.    She does not endorse current symptoms of depression, but reports that she has chronic, daily problems with anxiety and has developed a bad habit of trying to cope with her anxiety by ingesting.    Risk Assessment  She has a history of suicidal behavior, but today denied, with solid eye contact and convincing affect, current suicidal risk.    Impressions  Strengths: In spite of her severe mental illness and current medical crisis, Nevin interacted with me collaboratively today.  She appears to understand the need for her to resume therapy and add a higher level of care to respond to her current psychiatric/behavioral problems.  Liabilities: Nevin clearly has developed a bad habit of ingesting in order to cope with anxiety, and has been unable to restrain herself but is now working with a therapist on this problem.    Consultation with other team members  Conferred with Farzad Black DO.    Diagnoses   Borderline personality disorder (probable primary diagnosis); sequelae from ingestion.    Recommendations "   1.  I recommend discontinuing the , with ongoing monitoring by unit staff for any indications that Nevin is becoming distressed and behaviorally this regulated, in which case resuming monitoring of  would be indicated.  2.  I am available to return on 12-24-17 if needed, this will need to be ordered per  3.  Medically stable, discharge to existing community resources with the expectation that a higher level of care will be added next week as she has an intake for an IOP program.    Shy Cloud.D., L.P.  [phone]

## 2017-12-23 NOTE — DISCHARGE INSTRUCTIONS
The patient has received a copy of the Provation  report the doctor has written and discharge instructions have been discussed with the patient and responsible adult.  All questions were addressed and answered prior to patient discharge.                         Post-Procedure Discharge Instructions    You just had a gastroscopy. Your follow-up instructions are indicated below:    * You may not drive, use heavy equipment or consume alcohol for 24 hours because the drugs you were given may cause dizziness, drowsiness, forgetfulness and slower reaction time.     * Small pieces or tissue (biopsies) or polyps may have been removed.     * You may resume your regular diet and medications , except aspirin and ibuprofen products for the next 10 days if biopsy or polyps removed. Tylenol is safe.    Additional Instructions:   We are waiting for your pathology results. If you have not heard the results in 10 days call the office for the results.     What to watch for:    Problems rarely occur after the exam. It is important for you to be aware of the early signs of a possible complication. Call immediately if you notice any of the followin. Unusual pain or difficulty swallowing.   2.Unusual abdominal or chest pain.  3.Vomitting of blood.  4.Black or bloody stools.   5. Temperature above 100.6 degrees.       Responsible Adults Signature______________________    Relationship to Patient____________________________    Nurses Signature:________________________________  If you have any questions contact your physician.

## 2017-12-23 NOTE — CONSULTS
GI Consult dictated # [id number]    EGD today to attempt removal of purposely ingested metal foreign body.

## 2017-12-23 NOTE — PHARMACY-ADMISSION MEDICATION HISTORY
Admission medication history interview status for this patient is complete. See [facility] admission navigator for allergy information, prior to admission medications and immunization status.     Medication history interview source(s):Patient  Medication history resources (including written lists, pill bottles, clinic record):None    Changes made to PTA medication list:  Added: abilify, and trazodone  Deleted: Ibuprofen  Changed: lamictal    Actions taken by pharmacist (provider contacted, etc):None     Additional medication history information:None    Medication reconciliation/reorder completed by provider prior to medication history? No    For patients on insulin therapy: no (Yes/No)   Lantus/levemir/NPH/Mix 70/30 dose: ___ in AM/PM or twice daily   Sliding scale Novolog Y/N   If Yes, do you have a baseline novolog pre-meal dose: ______units with meals   Patients eat three meals a day: Y/N ---  How many episodes of hypoglycemia (low blood glucose) do you have weekly: ---   How many missed doses do you have a week: ---  How many times do you check your blood glucose per day: ---  Any Barriers to therapy: cost of medications/comfortable with giving injections (if applicable)/ comfortable and confident with current diabetes regimen ---      Prior to Admission medications    Medication Sig Last Dose Taking? Auth Provider   ARIPiprazole (ABILIFY) 10 MG tablet Take 10 mg by mouth every morning 12/22/2017 at am Yes Unknown, Entered By History   TRAZODONE HCL PO Take 50 mg by mouth nightly as needed for sleep Past Week at Unknown time Yes Unknown, Entered By History   lamoTRIgine (LAMICTAL) 100 MG tablet Take 150 mg by mouth every morning  12/22/2017 at am Yes Kodi Cage MD   acetaminophen (TYLENOL) 500 MG tablet Take 1-2 tablets (500-1,000 mg) by mouth every 8 hours as needed for mild pain  Yes Sandra Ramos MD   levonorgestrel (MIRENA) 20 MCG/24HR IUD 1 each (20 mcg) by Intrauterine route once for 1 dose active  Yes Sandra Ramos MD   Desvenlafaxine Succinate (PRISTIQ PO) Take 100 mg by mouth every morning  12/22/2017 at am Yes Reported, Patient   Cholecalciferol (VITAMIN D3 PO) Take 5,000 Units by mouth every morning  12/22/2017 at am Yes Reported, Patient

## 2017-12-24 NOTE — CONSULTS
URGENT GASTROINTESTINAL CONSULTATION      CHIEF COMPLAINT:  Purposeful ingestion of foreign body.      REASON FOR CONSULTATION:  We were asked to see Nevin Alvarado urgently for ingestion of metal foreign bodies.      HISTORY OF PRESENT ILLNESS:  This patient is a 27-year-old female who has frequent ingestions and rectal insertions of foreign bodies.  Review of the patient's chart indicates that she has a borderline personality disorder and tends to swallow objects when she becomes anxious.  The patient reported to me that 3 days ago she swallowed some earrings as well as the spring from a pen.  She had discomfort and went to [facility].  They evaluated her and told her these items would pass spontaneously.  She has been home, but began having vomiting last evening, and has been unable to tolerate any oral food intake.  Subsequently, she came to the [facility] ER and has been admitted to the hospital.  The patient has not had any significant vomiting since being in the hospital, but she has been kept n.p.o. as well.  She describes some upper abdominal pain which is different from things she has had in the past.  She denies any rectal insertions at this time.  She has had no other recent medical illnesses.  She is also being evaluated by the Psychiatry service.      PAST MEDICAL HISTORY:  The patient has an extensive past medical history of foreign body ingestions.  These are purposeful and usually metal objects.  The patient has had approximately 8 upper endoscopies in 2017 for removal of foreign bodies.  She has also had anoscopies and colonoscopies for removal of rectally inserted foreign bodies.  Additional past medical history includes attention deficit disorder, borderline personality disorder, depression, history of self-harm, history of migraine headaches, history of morbid obesity, history of post-traumatic stress disorder.      PAST SURGICAL HISTORY:  Includes multiple endoscopic studies, both EGDs  and colonoscopies.  She has had laparoscopies and laparotomies.  She has had multiple procedures for removal of foreign bodies.      ALLERGIES:  Listed as penicillin, Augmentin and cephalosporins.      SOCIAL HISTORY:  The patient is a nonsmoker.  She denies any alcohol use.      FAMILY HISTORY:  Negative for family members with ingestion of foreign bodies.      MEDICATIONS:     1.  Abilify.   2.  Trazodone.   3.  Lamictal.    4.  Tylenol.    5.  Mirena intrauterine device.   6.  Pristiq.    7.  Vitamin D3.      The remainder of her comprehensive review of systems is negative.      PHYSICAL EXAMINATION:   GENERAL:  The patient is an obese adult female who appears to be resting comfortably in her hospital room.   VITAL SIGNS:  Show that she is afebrile.  Blood pressure 119/63, pulse of 91, respiratory rate of 22.  Room air saturation is 94%.      PHYSICAL EXAMINATION.   SKIN:  Negative for jaundice.   HEENT:  Negative for scleral icterus.   NECK:  Freely mobile and supple.   CHEST:  Clear to auscultation bilaterally.   CARDIOVASCULAR:  Regular rate and rhythm, without murmur.   ABDOMEN:  Obese, had active bowel sounds, is soft.  There is no appreciable tenderness, mass, hepatosplenomegaly, guarding or rebound.   EXTREMITIES:  Right and left lower extremities are negative for edema.   NEUROLOGIC:  The patient is alert and nonfocal.      LABORATORY DATA:  Reviewed in the electronic medical record.  The patient has a normal creatinine, normal electrolytes and normal CBC.  Abdominal x-ray and CT scan obtained here reveal a spring-like metal foreign body which appears to be in the upper GI tract and appears to localize at the portion of the junction of the stomach and the duodenum.  It is possible that it has passed into the duodenum and is in the transverse duodenum.  There are 2 smaller objects which appear to be earrings which are farther down in the GI tract, and those should pass spontaneously.      IMPRESSION:  This  patient is a chronic purposeful ingester of foreign bodies.  Her last ingestion was 3 days ago.  She presents with some inability to eat and vomiting, with apparent retained foreign body in the upper gastrointestinal tract.  At this time, an urgent endoscopy will be helpful.  If the foreign body can be identified, it can be removed.  If it has already passed beyond the reach of the upper endoscope, then the patient will need to be followed clinically for additional symptoms and to ensure its passage.  All of this was explained to the patient.         AMIE FERNÁNDEZ MD             D: 2017 15:05   T: 2017 19:16   MT: VALENTIN      Name:     ABAD TONG   MRN:      [id number]        Account:       [id number]   :      1990           Consult Date:  2017      Document: [id number]

## 2017-12-25 ENCOUNTER — TRANSFERRED RECORDS (OUTPATIENT)
Dept: HEALTH INFORMATION MANAGEMENT | Facility: CLINIC | Age: 27
End: 2017-12-25

## 2017-12-25 ENCOUNTER — NURSE TRIAGE (OUTPATIENT)
Dept: NURSING | Facility: CLINIC | Age: 27
End: 2017-12-25

## 2017-12-25 LAB
ALT SERPL-CCNC: 18 IU/L (ref 8–45)
AST SERPL-CCNC: 14 IU/L (ref 2–40)

## 2017-12-25 NOTE — TELEPHONE ENCOUNTER
Reason for Disposition    Abdominal pain  (Exception: Pain clears with each passage of diarrhea stool)    Additional Information    Negative: [1] SEVERE abdominal pain (e.g., excruciating) AND [2] present > 1 hour    Negative: [1] SEVERE abdominal pain AND [2] age > 60    Negative: [1] Blood in the stool AND [2] moderate or large amount of blood    Protocols used: DIARRHEA-ADULT-AH

## 2017-12-26 ENCOUNTER — TELEPHONE (OUTPATIENT)
Dept: INTERNAL MEDICINE | Facility: CLINIC | Age: 27
End: 2017-12-26

## 2017-12-27 ENCOUNTER — CARE COORDINATION (OUTPATIENT)
Dept: CARE COORDINATION | Facility: CLINIC | Age: 27
End: 2017-12-27

## 2017-12-27 ENCOUNTER — TRANSFERRED RECORDS (OUTPATIENT)
Dept: HEALTH INFORMATION MANAGEMENT | Facility: CLINIC | Age: 27
End: 2017-12-27

## 2017-12-27 ENCOUNTER — OFFICE VISIT (OUTPATIENT)
Dept: INTERNAL MEDICINE | Facility: CLINIC | Age: 27
End: 2017-12-27
Payer: MEDICARE

## 2017-12-27 VITALS
OXYGEN SATURATION: 97 % | BODY MASS INDEX: 45.26 KG/M2 | HEIGHT: 61 IN | DIASTOLIC BLOOD PRESSURE: 80 MMHG | TEMPERATURE: 98.6 F | SYSTOLIC BLOOD PRESSURE: 128 MMHG | WEIGHT: 239.7 LBS | HEART RATE: 101 BPM

## 2017-12-27 DIAGNOSIS — R10.31 ABDOMINAL PAIN, RIGHT LOWER QUADRANT: ICD-10-CM

## 2017-12-27 DIAGNOSIS — R39.9 LOWER URINARY TRACT SYMPTOMS (LUTS): ICD-10-CM

## 2017-12-27 LAB
ALBUMIN UR-MCNC: NEGATIVE MG/DL
APPEARANCE UR: ABNORMAL
BACTERIA #/AREA URNS HPF: ABNORMAL /HPF
BILIRUB UR QL STRIP: NEGATIVE
COLOR UR AUTO: YELLOW
GLUCOSE UR STRIP-MCNC: NEGATIVE MG/DL
HGB UR QL STRIP: NEGATIVE
KETONES UR STRIP-MCNC: NEGATIVE MG/DL
LEUKOCYTE ESTERASE UR QL STRIP: ABNORMAL
NITRATE UR QL: NEGATIVE
NON-SQ EPI CELLS #/AREA URNS LPF: ABNORMAL /LPF
PH UR STRIP: 5.5 PH (ref 5–7)
RBC #/AREA URNS AUTO: ABNORMAL /HPF
SOURCE: ABNORMAL
SP GR UR STRIP: >1.03 (ref 1–1.03)
UROBILINOGEN UR STRIP-ACNC: 0.2 EU/DL (ref 0.2–1)
WBC #/AREA URNS AUTO: ABNORMAL /HPF

## 2017-12-27 PROCEDURE — 99214 OFFICE O/P EST MOD 30 MIN: CPT | Performed by: INTERNAL MEDICINE

## 2017-12-27 PROCEDURE — 81001 URINALYSIS AUTO W/SCOPE: CPT | Performed by: INTERNAL MEDICINE

## 2017-12-27 RX ORDER — DEXAMETHASONE 0.75 MG/1
TABLET ORAL
COMMUNITY
Start: 2017-12-26 | End: 2017-12-27

## 2017-12-27 RX ORDER — DOLUTEGRAVIR SODIUM 50 MG/1
TABLET, FILM COATED ORAL
COMMUNITY
Start: 2017-12-26 | End: 2017-12-27

## 2017-12-27 RX ORDER — DOLUTEGRAVIR SODIUM 50 MG/1
50 TABLET, FILM COATED ORAL DAILY
COMMUNITY
Start: 2017-12-27 | End: 2018-01-05

## 2017-12-27 RX ORDER — DEXAMETHASONE 0.75 MG/1
1 TABLET ORAL DAILY
COMMUNITY
Start: 2017-12-27 | End: 2018-01-05

## 2017-12-27 NOTE — PROGRESS NOTES
"  SUBJECTIVE:                                                      HPI: Nevin Alvarado is a pleasant 27 year old female who presents for hospital follow-up:    ---    Background:    PMH significant for PTSD, depression, anxiety, and borderline personality disorder.    Also, please see below for complete list of episodes of self-harm since April, 2016.    Patient has been living independently for several months now (was living in a group home prior to that).    She moved to a new apartment November 1st. Since her move, she missed multiple therapist appointments    Patient attributes her multiple episodes of self-harm (see next) to these missed appointments.     ---    Hospital follow-up:    When asked about hospital follow-up, patient says she is following up for her recent [facility] admission.      Patient does not recall her additional ER visits/admissions until I demonstrate that I have to access records outside of [facility].    In spite of this, patient continues to minimize her recent ER visit/admissions - saying she has only had \"a couple.\"    Reviewed each ER visit and admission with her in depth:    11/21/17 - [facility] - swallowed straightened out hairpin and spring; underwent EGD  11/22/17 - [facility] -swallowed pen spring, pen clip, and plastic piece from a Chapstick; underwent EGD  12/2/17 -[facility] - underwent EUA to remove 1 of the retained objects above  12/20/17 - [facility] -perfume bottle and rectum; removed in the ER with local anesthesia only  12/21/17- [facility] - swallow 2 earrings; no intervention performed  12/22/17 - [facility] - swallow to springs; underwent EGD  12/24/17 - [facility] -swallowed a mickie pin; underwent EGD  12/25/17 - [facility] - intentionally overdosed on hydroxyzine, trazodone, and tramadol    Discussed with patient that she has very clearly demonstrated an inability to safely live independently.      I strongly recommend that she return to a monitored setting ASAP.     Patient " "says she has strong support systems in place - therapist, psychiatrist, and visiting staff to help with medications.    Reminded patient that these supports were also in place prior to the most recent episodes of self-harm.    ---    In addition to above, patient also complains of \"red urine:\"   - ongoing for 2-3 days  - associated difficulty urinating, frequency, and urgency  - associated right lower quadrant abdominal pain   - sharp in quality   - moderate in severity   - always present, but worse with urination    - no dysuria  - no pelvic or flank pain  - no fevers or chills    ---    The medication, allergy, and problem lists have been reviewed and updated as appropriate.       OBJECTIVE:                                                      /80 (BP Location: Left arm, Patient Position: Chair, Cuff Size: Adult Large)  Pulse 101  Temp 98.6  F (37  C) (Oral)  Ht 5' 1\" (1.549 m)  Wt 239 lb 11.2 oz (108.7 kg)  SpO2 97%  BMI 45.29 kg/m2  Constitutional: well-appearing  Respiratory: normal respiratory effort; clear to auscultation bilaterally  Cardiovascular: regular rate and rhythm; no edema  Gastrointestinal: soft, non-distended, and bowel sounds present; tenderness with minimal palpation right lower quadrant, no rebound no organomegaly or masses   Musculoskeletal: normal gait and station  Psych: poor judgment and insight; normal mood and affect    UA (today): no blood and not suggestive of UTI    ASSESSMENT/PLAN:                                                      (Z91.5) History of self-harm  (primary encounter diagnosis)  Comment: please see below for complete list of episodes of self-harm since April, 2016.  Plan:    - in my opinion patient is not safe to be living independently.   - I strongly recommend that she lived in a closely monitored setting.   - will be placing a care coordination referral to this effect - patient is aware that this is my recommendation.    (R39.9) Lower urinary tract " symptoms (LUTS)  (R10.31) Abdominal pain, right lower quadrant  Comment: negative UA  Plan: AXR ordered - patient did not obtain today for unclear reasons.    The instructions on the AVS were discussed and explained to the patient. Patient expressed understanding of instructions.    (Chart documentation was completed, in part, with Mempile voice-recognition software. Even though reviewed, some grammatical, spelling, and word errors may remain.)    Sandra Ramos MD   [address] [address]  T: [phone], F: [phone]    ---    ER visits/hospitalizations (4/16 - present) related to self harm only (multiple additional ER visits/hospitalizations)     4/8/16 - [facility] - ingestion of mickie pins, IV line, screws, plastic utensil; underwent 2 EGDs  4/13/16 - [facility] - swallowed two mickie pins; transferred to [facility]; underwent EGD  9/23/16 - [facility] - swallowed mickie pin(s?) - underwent EGD  10/17/16 - [facility] - unintentional overdose of Nyquil  11/1/16 - [facility]; swallowed spring and metal piece from a potato chip bag clip; underwent EGD  11/14/16 - [facility] - unintentional salicylate ingestion  11/25/16 - [facility] - swallowed copper wire and several mickie pins; underwent EGD  12/17/16 - [facility] - glass essential oil roller ball bottle per rectum; extracted manually in ER   12/18/16 - [facility] - flashlight per rectum while masturbating - underwent ex laparotomy and flex sigmoidoscopy  12/22/16 - [facility] - vibrator per rectum while having sex; extracted manually in ER  1/4/17 - [facility] - plastic paint bottle per rectum during intercourse; retrieved in ER   1/7/17 - [facility] - perfume bottle per rectum during intercourse; removed in ER  1/10/17 - [facility] - glass spray bottle per rectum during intercourse; underwent ex laparotomy and flex sigmoidoscopy  3/7/17 - [facility] - glass cylinder per rectum while masturbating; required EUA and flex  sigmoidoscopy to remove  3/9/17 - [facility] - swallowed 3 staples (inserted in hot dog); underwent EGD  3/12/17 - [facility] - glass bottle per rectum; extracted in ER   3/16/17 - [facility] - glass bottle per rectum - underwent EUA and flex sig  4/16/17 - [facility] - unintentional overdose of oxycodone  4/19/17 - [facility] - suicide attempt with OD of percocet  4/19/17 - [facility] - swallowed 2 mickie pins while in inpatient psych - underwent EGD  6/4/17 - [facility] - swallowed a mickie pin; no intervention  6/4/17 - [facility] - swallowed a mickie pin and broken metal clip; underwent EGD  6/8/17 - [facility] - swallowed 3 paperclips - underwent EGD  6/11/17 - [facility] swallowed pen spring while in inpatient psych - underwent EGD  7/8/17 - [facility] - retained tampon  8/12/17 - [facility] - perfume bottle per rectum during intercourse; underwent ex laparotomy and flex sigmoidoscopy  11/21/17 - [facility] - swallowed straightened out hairpin and spring; underwent EGD  11/22/17 - [facility] -swallowed pen spring, pen clip, and plastic piece from a Chapstick; underwent EGD  12/2/17 -[facility] - underwent EUA to remove 1 of the retained objects above  [place]17 [place] [facility] [place]perf[place] and rectum; removed in the ER with local anesthesia only  12/21/17- [facility] - swallow 2 earrings; no intervention performed  12/22/17 - [facility] - swallow to springs; underwent EGD  12/24/17 - [facility] -swallowed a mickie pin; underwent EGD  12/25/17 - [facility] - intentionally overdosed on hydroxyzine, trazodone, and tramadol

## 2017-12-27 NOTE — MR AVS SNAPSHOT
After Visit Summary   12/27/2017    Nevin Alvarado    MRN: [id number]           Patient Information     Date Of Birth          1990        Visit Information        Provider Department      12/27/2017 1:00 PM Sandra Ramos MD [facility]        Today's Diagnoses     Lower urinary tract symptoms (LUTS)    -  1    Abdominal pain, right lower quadrant          Care Instructions    Urine sample today.    ---    Xrays of abdomen downstairs after.    ---          Follow-ups after your visit        Future tests that were ordered for you today     Open Future Orders        Priority Expected Expires Ordered    XR Abdomen 2 Views Routine 12/27/2017 12/27/2018 12/27/2017            Who to contact     If you have questions or need follow up information about today's clinic visit or your schedule please contact [facility] directly at [phone].  Normal or non-critical lab and imaging results will be communicated to you by MyChart, letter or phone within 4 business days after the clinic has received the results. If you do not hear from us within 7 days, please contact the clinic through [facility] or phone. If you have a critical or abnormal lab result, we will notify you by phone as soon as possible.  Submit refill requests through [employer] or call your pharmacy and they will forward the refill request to us. Please allow 3 business days for your refill to be completed.          Additional Information About Your Visit        MyChart Information     [employer] gives you secure access to your electronic health record. If you see a primary care provider, you can also send messages to your care team and make appointments. If you have questions, please call your primary care clinic.  If you do not have a primary care provider, please call [phone] and they will assist you.        Care EveryWhere ID     This is your Care EveryWhere ID. This could be used  "by other organizations to access your [facility] medical records  [id number]        Your Vitals Were     Pulse Temperature Height Pulse Oximetry BMI (Body Mass Index)       101 98.6  F (37  C) (Oral) 5' 1\" (1.549 m) 97% 45.29 kg/m2        Blood Pressure from Last 3 Encounters:   12/27/17 128/80   12/23/17 139/78   11/05/17 (!) 146/94    Weight from Last 3 Encounters:   12/27/17 239 lb 11.2 oz (108.7 kg)   12/23/17 242 lb (109.8 kg)   11/05/17 230 lb (104.3 kg)              We Performed the Following     UA reflex to Microscopic and Culture          Today's Medication Changes          These changes are accurate as of: 12/27/17  1:30 PM.  If you have any questions, ask your nurse or doctor.               These medicines have changed or have updated prescriptions.        Dose/Directions    TIVICAY 50 MG tablet   This may have changed:    - how much to take  - how to take this  - when to take this   Generic drug:  dolutegravir   Changed by:  Sandra Ramos MD        Dose:  50 mg   Take 1 tablet (50 mg) by mouth daily   Refills:  0       TRUVADA 200-300 MG per tablet   This may have changed:  See the new instructions.   Generic drug:  emtricitabine-tenofovir   Changed by:  Sandra Ramos MD        Dose:  1 tablet   Take 1 tablet by mouth daily   Refills:  0                Primary Care Provider Office Phone # Fax #    Sandra Ramos [phone]       [address] [place] [address]        Equal Access to Services     YUE DIAMOND : Hadii zaire duqueo Sogwendolyn, waaxda luqadaha, qaybta kaalmada jona, mic persaud. So [facility] [phone].    ATENCIÓN: Si habla español, tiene a singh disposición servicios gratuitos de asistencia lingüística. Llame al [phone].    We comply with applicable federal civil rights laws and [place] laws. We do not discriminate on the basis of race, color, national origin, age, disability, sex, sexual orientation, or gender " identity.            Thank you!     Thank you for choosing [facility]  for your care. Our goal is always to provide you with excellent care. Hearing back from our patients is one way we can continue to improve our services. Please take a few minutes to complete the written survey that you may receive in the mail after your visit with us. Thank you!             Your Updated Medication List - Protect others around you: Learn how to safely use, store and throw away your medicines at www.disposemymeds.org.          This list is accurate as of: 12/27/17  1:30 PM.  Always use your most recent med list.                   Brand Name Dispense Instructions for use Diagnosis    ABILIFY 10 MG tablet   Generic drug:  ARIPiprazole      Take 10 mg by mouth every morning        acetaminophen 500 MG tablet    TYLENOL    90 tablet    Take 1-2 tablets (500-1,000 mg) by mouth every 8 hours as needed for mild pain    Moderate pain       LAMICTAL 100 MG tablet   Generic drug:  lamoTRIgine      Take 150 mg by mouth every morning        levonorgestrel 20 MCG/24HR IUD    MIRENA     1 each (20 mcg) by Intrauterine route once for 1 dose        PRISTIQ PO      Take 100 mg by mouth every morning        TIVICAY 50 MG tablet   Generic drug:  dolutegravir      Take 1 tablet (50 mg) by mouth daily        TRAZODONE HCL PO      Take 50 mg by mouth nightly as needed for sleep        TRUVADA 200-300 MG per tablet   Generic drug:  emtricitabine-tenofovir      Take 1 tablet by mouth daily        VITAMIN D3 PO      Take 5,000 Units by mouth every morning

## 2017-12-27 NOTE — PROGRESS NOTES
"Clinic Care Coordination Contact  Care Team Conversations    Hospital follow up from 12/22 to 12/23 for ingesting foreign objects.  Endoscopy was preformed and the objects were removed.    Pt was also evaluated by psychiatry.  \"D/C summary from [facility] dated 11-4-17 documented that she was admitted for ingestion of 2 safety pins, which were successfully removed, but ingested additional items prior to discharge.   D/C summary from [facility] dated 8-24-17.  She was admitted for treatment of a foreign object, a bottle, in her  Rectum.   D/C summary from [facility] for psychiatric admission due to ingesting foreign objects. \"    The patient's current support system:  Psychiatrist - Loni Hill with [facility] [employer]   Therapist - [facility]  Adult  - Yajaira Gardner with [employer]  CADI worker ( Danielle) - Valencia Josephie [phone]  ILS worker  Lives in an independent housing facility with minimal services  \"she has an intake at an IOP program at [facility], which will occur on 12-26-17\"    CCSW spoke with the pt. She states she is doing ok since discharge but is still in pain. Has an appt at  1pm with Dr. Ramos for follow up.     PLAN:  Pt reports she is doing well and has an extensive support system in place. She is also starting an intensive OP program to address her impulsivity with swallowing foreign objects.   Pt will outreach as needed  CCSW will not continue to follow.    Nevin Patterson, Social Work Care Coordinator, LG, MSW  [facility]: [place], [place], [place], and [place]   P:[phone]/ Signed December 27, 2017     "

## 2017-12-27 NOTE — NURSING NOTE
"Chief Complaint   Patient presents with     ER F/U     12/22/17 - 12/23/17 [facility] of swallowing foreign body.       Initial /80 (BP Location: Left arm, Patient Position: Chair, Cuff Size: Adult Large)  Pulse 101  Temp 98.6  F (37  C) (Oral)  Ht 5' 1\" (1.549 m)  Wt 239 lb 11.2 oz (108.7 kg)  SpO2 97%  BMI 45.29 kg/m2 Estimated body mass index is 45.29 kg/(m^2) as calculated from the following:    Height as of this encounter: 5' 1\" (1.549 m).    Weight as of this encounter: 239 lb 11.2 oz (108.7 kg).  Medication Reconciliation: complete     Kaminibose MA      "

## 2017-12-29 ENCOUNTER — TELEPHONE (OUTPATIENT)
Dept: INTERNAL MEDICINE | Facility: CLINIC | Age: 27
End: 2017-12-29

## 2017-12-29 NOTE — TELEPHONE ENCOUNTER
Pt removed from restricted MA approx one year ago.  Would Dr. Ramos be willing to complete paperwork to reinstate this?    OK to lv detailed msg w/ Becca Neves - phone # listed.

## 2017-12-30 ENCOUNTER — TRANSFERRED RECORDS (OUTPATIENT)
Dept: HEALTH INFORMATION MANAGEMENT | Facility: CLINIC | Age: 27
End: 2017-12-30

## 2017-12-31 NOTE — TELEPHONE ENCOUNTER
I'm not sure what this means.     I am happy to complete paperwork for this patient as I am her PCP but don't know if restricted MA is appropriate for her or not.    Please discuss with Becca Neves.     Thank you.

## 2018-01-01 ENCOUNTER — TELEPHONE (OUTPATIENT)
Dept: INTERNAL MEDICINE | Facility: CLINIC | Age: 28
End: 2018-01-01

## 2018-01-01 NOTE — TELEPHONE ENCOUNTER
In light of patient's multiple episodes of self-harm a report was placed on line - [place] [facility].    Date/time submitted: 1/1/2018, 12:05:08 PM     Web/Report number: [id number]

## 2018-01-02 ENCOUNTER — TRANSFERRED RECORDS (OUTPATIENT)
Dept: HEALTH INFORMATION MANAGEMENT | Facility: CLINIC | Age: 28
End: 2018-01-02

## 2018-01-04 ENCOUNTER — TRANSFERRED RECORDS (OUTPATIENT)
Dept: HEALTH INFORMATION MANAGEMENT | Facility: CLINIC | Age: 28
End: 2018-01-04

## 2018-01-05 ENCOUNTER — APPOINTMENT (OUTPATIENT)
Dept: GENERAL RADIOLOGY | Facility: CLINIC | Age: 28
End: 2018-01-05
Attending: EMERGENCY MEDICINE
Payer: MEDICARE

## 2018-01-05 ENCOUNTER — HOSPITAL ENCOUNTER (EMERGENCY)
Facility: CLINIC | Age: 28
Discharge: HOME | End: 2018-01-06
Attending: EMERGENCY MEDICINE | Admitting: EMERGENCY MEDICINE
Payer: MEDICARE

## 2018-01-05 DIAGNOSIS — T18.9XXA SWALLOWED FOREIGN BODY, INITIAL ENCOUNTER: ICD-10-CM

## 2018-01-05 PROCEDURE — 71045 X-RAY EXAM CHEST 1 VIEW: CPT

## 2018-01-05 PROCEDURE — 99285 EMERGENCY DEPT VISIT HI MDM: CPT | Mod: 25

## 2018-01-05 PROCEDURE — 74018 RADEX ABDOMEN 1 VIEW: CPT | Mod: XS

## 2018-01-05 RX ORDER — PROPOFOL 10 MG/ML
400 INJECTION, EMULSION INTRAVENOUS ONCE
Status: DISCONTINUED | OUTPATIENT
Start: 2018-01-05 | End: 2018-01-06 | Stop reason: HOSPADM

## 2018-01-05 RX ORDER — ONDANSETRON 2 MG/ML
4 INJECTION INTRAMUSCULAR; INTRAVENOUS ONCE
Status: COMPLETED | OUTPATIENT
Start: 2018-01-05 | End: 2018-01-06

## 2018-01-05 ASSESSMENT — ENCOUNTER SYMPTOMS
ABDOMINAL PAIN: 1
VOMITING: 0
DIARRHEA: 0
SHORTNESS OF BREATH: 0
NAUSEA: 0

## 2018-01-05 NOTE — ED AVS SNAPSHOT
[facility] Emergency Department    [address]    [facility] [address]    Phone:  [phone]    Fax:  [phone]                                       Nevin Alvarado   MRN: [id number]    Department:  [facility] Emergency Department   Date of Visit:  1/5/2018           After Visit Summary Signature Page     I have received my discharge instructions, and my questions have been answered. I have discussed any challenges I see with this plan with the nurse or doctor.    ..........................................................................................................................................  Patient/Patient Representative Signature      ..........................................................................................................................................  Patient Representative Print Name and Relationship to Patient    ..................................................               ................................................  Date                                            Time    ..........................................................................................................................................  Reviewed by Signature/Title    ...................................................              ..............................................  Date                                                            Time

## 2018-01-05 NOTE — ED AVS SNAPSHOT
[facility] Emergency Department    [address] [place] [address]    Phone:  [phone]    Fax:  [phone]                                       Nevin Alvarado   MRN: [id number]    Department:  [facility] Emergency Department   Date of Visit:  1/5/2018           Patient Information     Date Of Birth          1990        Your diagnoses for this visit were:     Swallowed foreign body, initial encounter        You were seen by Siddharth Soler MD.      Follow-up Information     Follow up with PCP and Mental Health.        Discharge Instructions         Swallowed Foreign Body (Adult)  Indigestible objects (foreign bodies) are sometimes swallowed by adults. Whether or not the object moves all the way through the digestive tract depends on many factors. This includes the size and shape of the object, whether the object is sharp and pointy, and what the object is made of.  Based on your evaluation, no treatment is needed at this time. The swallowed object is expected to move through your digestive tract and pass out of the body in the stool with no problems. This may take about 24 to 48 hours, but could take longer depending on your bowel habits. If imaging tests were done, you will be told when the results are ready and if they affect your treatment.  Home care    Follow any instructions from your provider about eating and drinking. In certain cases, you may be told to only eat soft foods and drink liquids for the first 24 to 48 hours.    You will need to check your stool each time you have a bowel movement. This is so you can confirm that the object has passed, and look for signs of bleeding. If the object does not pass, it may mean that the object is stuck somewhere along the digestive tract. In such cases, the object may need to be removed with a procedure.  Follow-up care  Follow up with your healthcare provider as advised. You will be told if further  treatment is needed. In certain cases, you may need to return to have imaging tests done. Call your healthcare provider if you have any questions or concerns.  When to seek medical advice  Call your healthcare provider right away if any of these occur:    Belly pain, cramps, or swelling    Shortness of breath or coughing that won t stop    Trouble swallowing or pain with swallowing    Vomiting that won t stop    Blood in the stool (dark red or black color)    Fever of 100.4 F (38 C) or higher, or as directed by your provider  Call 911  Call 911 or return to the emergency department right away if any of these occur:    Trouble breathing, wheezing    Trouble speaking    Unusually fast heart rate    New or worsening chest pain    Vomiting blood (red or black)    Swelling of the neck    Inability to pass stool  Date Last Reviewed: 8/1/2017 2000-2017 [employer]. [address] [place] [address]. All rights reserved. This information is not intended as a substitute for professional medical care. Always follow your healthcare professional's instructions.          24 Hour Appointment Hotline       To make an appointment at any [facility], call [phone] ([phone]). If you don't have a family doctor or clinic, we will help you find one. [facility] clinics are conveniently located to serve the needs of you and your family.             Review of your medicines      Our records show that you are taking the medicines listed below. If these are incorrect, please call your family doctor or clinic.        Dose / Directions Last dose taken    ABILIFY 10 MG tablet   Dose:  10 mg   Generic drug:  ARIPiprazole        Take 10 mg by mouth every morning   Refills:  0        LAMICTAL 100 MG tablet   Dose:  150 mg   Generic drug:  lamoTRIgine        Take 150 mg by mouth every morning   Refills:  0        levonorgestrel 20 MCG/24HR IUD   Commonly known as:  MIRENA   Dose:  1 each        1 each (20  mcg) by Intrauterine route once for 1 dose   Refills:  0        PRISTIQ PO   Dose:  100 mg        Take 100 mg by mouth every morning   Refills:  0        VITAMIN D3 PO   Dose:  5000 Units        Take 5,000 Units by mouth every morning   Refills:  0                Procedures and tests performed during your visit     Abdomen XR 1 vw    UPPER GI ENDOSCOPY    XR Chest 1 View      Orders Needing Specimen Collection     None      Pending Results     No orders found for last 3 day(s).            Pending Culture Results     No orders found for last 3 day(s).            Pending Results Instructions     If you had any lab results that were not finalized at the time of your Discharge, you can call the ED Lab Result RN at [phone]. You will be contacted by this team for any positive Lab results or changes in treatment. The nurses are available 7 days a week from 10A to 6:30P.  You can leave a message 24 hours per day and they will return your call.        Test Results From Your Hospital Stay        1/6/2018  2:28 AM      Narrative     CHEST AND ABDOMEN 2 VIEWS  1/5/2018 11:26 PM     HISTORY: Swallowed two mickie pins.    COMPARISON:  12/22/2017 - Abdomen radiographs.  10/24/2017 - Chest radiograph.    FINDINGS: A 13 cm long thin V-shaped metallic foreign object projects  over the mid abdomen, consistent with an ingested mickie pin. A short 4  cm long intermediate density linear structure projecting over the  central pelvis. No other visualized radiopaque foreign objects in the  visualized portions of the chest or abdomen. The lungs are clear.  Normal-sized cardiac silhouette. Gas is present within nondilated  small and large bowel. No visualized bowel wall thickening,  pneumatosis or free intraperitoneal gas.        Impression     IMPRESSION:  1. An ingested mickie pin projects over the mid abdomen. At this  location, it could be within the gastric body, duodenum, jejunum or  transverse colon.  2. A short intermediate density  linear structure projecting over the  pelvis. This is not well visualized on this study due to its  orientation, but most likely represents an intrauterine device as one  was visualized in a similar location on the comparison study dated  12/22/2017. A second ingested foreign body cannot be entirely  excluded.  3. No evidence of bowel obstruction.    FRANCES JAEGER MD         1/6/2018  2:28 AM      Narrative     CHEST AND ABDOMEN 2 VIEWS  1/5/2018 11:26 PM     HISTORY: Swallowed two mickie pins.    COMPARISON:  12/22/2017 - Abdomen radiographs.  10/24/2017 - Chest radiograph.    FINDINGS: A 13 cm long thin V-shaped metallic foreign object projects  over the mid abdomen, consistent with an ingested mickie pin. A short 4  cm long intermediate density linear structure projecting over the  central pelvis. No other visualized radiopaque foreign objects in the  visualized portions of the chest or abdomen. The lungs are clear.  Normal-sized cardiac silhouette. Gas is present within nondilated  small and large bowel. No visualized bowel wall thickening,  pneumatosis or free intraperitoneal gas.        Impression     IMPRESSION:  1. An ingested mickie pin projects over the mid abdomen. At this  location, it could be within the gastric body, duodenum, jejunum or  transverse colon.  2. A short intermediate density linear structure projecting over the  pelvis. This is not well visualized on this study due to its  orientation, but most likely represents an intrauterine device as one  was visualized in a similar location on the comparison study dated  12/22/2017. A second ingested foreign body cannot be entirely  excluded.  3. No evidence of bowel obstruction.    FRANCES JAEGER MD                Clinical Quality Measure: Blood Pressure Screening     Your blood pressure was checked while you were in the emergency department today. The last reading we obtained was  BP: 107/51 . Please read the guidelines below about what these  numbers mean and what you should do about them.  If your systolic blood pressure (the top number) is less than 120 and your diastolic blood pressure (the bottom number) is less than 80, then your blood pressure is normal. There is nothing more that you need to do about it.  If your systolic blood pressure (the top number) is 120-139 or your diastolic blood pressure (the bottom number) is 80-89, your blood pressure may be higher than it should be. You should have your blood pressure rechecked within a year by a primary care provider.  If your systolic blood pressure (the top number) is 140 or greater or your diastolic blood pressure (the bottom number) is 90 or greater, you may have high blood pressure. High blood pressure is treatable, but if left untreated over time it can put you at risk for heart attack, stroke, or kidney failure. You should have your blood pressure rechecked by a primary care provider within the next 4 weeks.  If your provider in the emergency department today gave you specific instructions to follow-up with your doctor or provider even sooner than that, you should follow that instruction and not wait for up to 4 weeks for your follow-up visit.        Thank you for choosing [facility]       Thank you for choosing [facility] for your care. Our goal is always to provide you with excellent care. Hearing back from our patients is one way we can continue to improve our services. Please take a few minutes to complete the written survey that you may receive in the mail after you visit with us. Thank you!        [employer] Information     [employer] gives you secure access to your electronic health record. If you see a primary care provider, you can also send messages to your care team and make appointments. If you have questions, please call your primary care clinic.  If you do not have a primary care provider, please call [phone] and they will assist you.        Care EveryWhere ID     This is your Care  EveryWhere ID. This could be used by other organizations to access your [facility] medical records  [id number]        Equal Access to Services     ZENON DIAMOND : Gabriel Collado, erick singh, taylor tenorio, mic persaud. So [facility] [phone].    ATENCIÓN: Si habla español, tiene a singh disposición servicios gratuitos de asistencia lingüística. Llame al [phone].    We comply with applicable federal civil rights laws and [place] laws. We do not discriminate on the basis of race, color, national origin, age, disability, sex, sexual orientation, or gender identity.            After Visit Summary       This is your record. Keep this with you and show to your community pharmacist(s) and doctor(s) at your next visit.

## 2018-01-06 ENCOUNTER — SURGERY (OUTPATIENT)
Age: 28
End: 2018-01-06

## 2018-01-06 VITALS
TEMPERATURE: 97.8 F | DIASTOLIC BLOOD PRESSURE: 72 MMHG | BODY MASS INDEX: 45.2 KG/M2 | HEART RATE: 88 BPM | SYSTOLIC BLOOD PRESSURE: 120 MMHG | WEIGHT: 239.42 LBS | HEIGHT: 61 IN | RESPIRATION RATE: 18 BRPM | OXYGEN SATURATION: 98 %

## 2018-01-06 PROCEDURE — 25000125 ZZHC RX 250: Performed by: INTERNAL MEDICINE

## 2018-01-06 PROCEDURE — 25000128 H RX IP 250 OP 636: Performed by: EMERGENCY MEDICINE

## 2018-01-06 PROCEDURE — 25000128 H RX IP 250 OP 636

## 2018-01-06 PROCEDURE — 40000275 ZZH STATISTIC RCP TIME EA 10 MIN

## 2018-01-06 PROCEDURE — 43247 EGD REMOVE FOREIGN BODY: CPT | Performed by: INTERNAL MEDICINE

## 2018-01-06 RX ORDER — ONDANSETRON 4 MG/1
4 TABLET, ORALLY DISINTEGRATING ORAL EVERY 6 HOURS PRN
Status: CANCELLED | OUTPATIENT
Start: 2018-01-06

## 2018-01-06 RX ORDER — LIDOCAINE 40 MG/G
CREAM TOPICAL
Status: CANCELLED | OUTPATIENT
Start: 2018-01-06

## 2018-01-06 RX ORDER — ONDANSETRON 2 MG/ML
4 INJECTION INTRAMUSCULAR; INTRAVENOUS EVERY 6 HOURS PRN
Status: CANCELLED | OUTPATIENT
Start: 2018-01-06

## 2018-01-06 RX ORDER — PROPOFOL 10 MG/ML
INJECTION, EMULSION INTRAVENOUS
Status: DISCONTINUED
Start: 2018-01-06 | End: 2018-01-06 | Stop reason: HOSPADM

## 2018-01-06 RX ORDER — NALOXONE HYDROCHLORIDE 0.4 MG/ML
.1-.4 INJECTION, SOLUTION INTRAMUSCULAR; INTRAVENOUS; SUBCUTANEOUS
Status: CANCELLED | OUTPATIENT
Start: 2018-01-06 | End: 2018-01-07

## 2018-01-06 RX ORDER — ONDANSETRON 2 MG/ML
4 INJECTION INTRAMUSCULAR; INTRAVENOUS
Status: CANCELLED | OUTPATIENT
Start: 2018-01-06

## 2018-01-06 RX ORDER — FLUMAZENIL 0.1 MG/ML
0.2 INJECTION, SOLUTION INTRAVENOUS
Status: CANCELLED | OUTPATIENT
Start: 2018-01-06 | End: 2018-01-06

## 2018-01-06 RX ORDER — PROPOFOL 10 MG/ML
INJECTION, EMULSION INTRAVENOUS
Status: COMPLETED
Start: 2018-01-06 | End: 2018-01-06

## 2018-01-06 RX ADMIN — PROPOFOL 240 MG: 10 INJECTION, EMULSION INTRAVENOUS at 01:37

## 2018-01-06 RX ADMIN — BENZOCAINE 1 APPLICATOR: 220 SPRAY, METERED PERIODONTAL at 01:17

## 2018-01-06 RX ADMIN — ONDANSETRON 4 MG: 2 INJECTION INTRAMUSCULAR; INTRAVENOUS at 01:39

## 2018-01-06 NOTE — OR NURSING
Called in to remove foreign body as per pt is mickie gardner ,pt signed consent for propofol sedation by er Md dr Soler  ,dr mosqueda at bed side ,er Md aware pt does not have a ride and as per er Md pt will be monitored for 12 hrs in er ,pt tolerated procedure well er Md and flying squad Rn rach and er Rn jake perry at pt bed side ,respiratory therapist also at bed side ,pin was removed from pt stomach with witness of er Md ,pt tolerated procedure well and closely monitored by er Rn at bed side and flying obed Harrison rach

## 2018-01-06 NOTE — ED NOTES
Discharge instructions gone over with pt, verbalized understanding. Discharged home with plan for follow up and no new prescriptions. Denies questions at time of discharge. Patient drank water prior to discharge and tolerated well.

## 2018-01-06 NOTE — CONSULTS
[facility]  Pre-Endoscopy History and Physical     Nevin Alvarado MRN# [id number]   YOB: 1990 Age: 27 year old   Today's Date: 01/06/2018    Date of Procedure: 1/5/2018  Primary care provider: Sandra Ramos  Type of Endoscopy: esophagogastroduodenoscopy (upper GI endoscopy)  Reason for Procedure: Gastric foreign body  Type of Anesthesia Anticipated: Propofol sedation will be administered by the emergency room attending physician    HPI:    Nevin is a 27 year old female who will be undergoing the above procedure.      A history and physical has been performed. The patient's medications and allergies have been reviewed. The risks and benefits of the procedure and the sedation options and risks were discussed with the patient.  All questions were answered and informed consent was obtained.      Allergies   Allergen Reactions     Penicillins Anaphylaxis     Blood-Group Specific Substance Other (See Comments)     Patient has an anti-Cw and non-specific antibodies. Blood product orders may be delayed. Draw one red top and two purple top tubes for all type/screen/crossmatch orders.     Hydrocodone Nausea and Vomiting     vomiting for days     Influenza Vaccines Other (See Comments)     Oseltamivir Hives     med stopped, PN: med stopped     Vicodin [Hydrocodone-Acetaminophen] Nausea and Vomiting     Cephalosporins Rash        Current Facility-Administered Medications   Medication     ondansetron (ZOFRAN) injection 4 mg     propofol (DIPRIVAN) injection 10 mg/mL vial     Current Outpatient Prescriptions   Medication     ARIPiprazole (ABILIFY) 10 MG tablet     lamoTRIgine (LAMICTAL) 100 MG tablet     Desvenlafaxine Succinate (PRISTIQ PO)     Cholecalciferol (VITAMIN D3 PO)     levonorgestrel (MIRENA) 20 MCG/24HR IUD       Patient Active Problem List   Diagnosis     Migraine without aura     Depression     Borderline personality disorder     Anxiety     H/O self-harm     ADD  (attention deficit disorder)     PTSD (post-traumatic stress disorder)     Morbid obesity (H)     Rectal foreign body     Suicidal intent     Suicidal ideation     Syncope     Foreign body ingestion        Past Medical History:   Diagnosis Date     ADD (attention deficit disorder)      Anorexia nervosa with bulimia     history of; on Topamax     Anxiety      Borderline personality disorder      Depression      Depressive disorder      H/O self-harm      Lives in independent group home     due to debilitating mental illness     Migraine without aura     no known triggers; on Topamax bid and Imitrex PRN     Morbid obesity (H)      PTSD (post-traumatic stress disorder)      Rectal foreign body - Recurrent issue, self placed      Swallowed foreign body - Recurrent issue, self placed         Past Surgical History:   Procedure Laterality Date     ESOPHAGOSCOPY, GASTROSCOPY, DUODENOSCOPY (EGD), COMBINED N/A 3/9/2017    Procedure: COMBINED ESOPHAGOSCOPY, GASTROSCOPY, DUODENOSCOPY (EGD), REMOVE FOREIGN BODY;  Surgeon: Avis Guzmán MD;  Location: [facility] OR     ESOPHAGOSCOPY, GASTROSCOPY, DUODENOSCOPY (EGD), COMBINED N/A 4/20/2017    Procedure: COMBINED ESOPHAGOSCOPY, GASTROSCOPY, DUODENOSCOPY (EGD), REMOVE FOREIGN BODY;  EGD removal Foregin body;  Surgeon: Lokesh Paula MD;  Location: [facility] OR     ESOPHAGOSCOPY, GASTROSCOPY, DUODENOSCOPY (EGD), COMBINED N/A 6/12/2017    Procedure: COMBINED ESOPHAGOSCOPY, GASTROSCOPY, DUODENOSCOPY (EGD);  COMBINED ESOPHAGOSCOPY, GASTROSCOPY, DUODENOSCOPY (EGD) [[id number]]attempted removal of foreign body;  Surgeon: Pamela Perez MD;  Location: [facility] OR     ESOPHAGOSCOPY, GASTROSCOPY, DUODENOSCOPY (EGD), COMBINED N/A 6/9/2017    Procedure: COMBINED ESOPHAGOSCOPY, GASTROSCOPY, DUODENOSCOPY (EGD), REMOVE FOREIGN BODY;  Esophagoscopy, Gastroscopy, Duodenoscopy, Removal of Foreign Body;  Surgeon: Dejon Marsh MD;  Location: [facility] OR     EXAM UNDER ANESTHESIA ANUS N/A  "1/10/2017    Procedure: EXAM UNDER ANESTHESIA ANUS;  Surgeon: Annmarie Haynes MD;  Location: [facility] OR     HC REMOVE FECAL IMPACTION OR FB W ANESTHESIA N/A 12/18/2016    Procedure: REMOVE FECAL IMPACTION/FOREIGN BODY UNDER ANESTHESIA;  Surgeon: Soham Cano MD;  Location: [facility] OR     KNEE SURGERY - removed a small tissue mass from knee Right      LAPAROSCOPIC ABLATION ENDOMETRIOSIS       LAPAROTOMY EXPLORATORY N/A 1/10/2017    Procedure: LAPAROTOMY EXPLORATORY;  Surgeon: Annmarie Haynes MD;  Location: [facility] OR     lymph nodes removed from neck; benign  age 7     MAMMOPLASTY REDUCTION Bilateral      RELEASE CARPAL TUNNEL Bilateral      SIGMOIDOSCOPY FLEXIBLE N/A 1/10/2017    Procedure: SIGMOIDOSCOPY FLEXIBLE;  Surgeon: Annmarie Haynes MD;  Location: [facility] OR       Social History   Substance Use Topics     Smoking status: Never Smoker     Smokeless tobacco: Never Used     Alcohol use No       Family History   Problem Relation Age of Onset     Type 2 Diabetes Maternal Grandmother      Type 2 Diabetes Paternal Grandmother      Breast Cancer Paternal Grandmother      CEREBROVASCULAR DISEASE Father 53     Myocardial Infarction No family hx of      Coronary Artery Disease Early Onset No family hx of      Ovarian Cancer No family hx of      Colon Cancer No family hx of          PHYSICAL EXAM:   BP (!) 148/97  Temp 97.8  F (36.6  C) (Temporal)  Resp 18  Ht 1.549 m (5' 1\")  Wt 108.6 kg (239 lb 6.7 oz)  SpO2 95%  BMI 45.24 kg/m2 Estimated body mass index is 45.24 kg/(m^2) as calculated from the following:    Height as of this encounter: 1.549 m (5' 1\").    Weight as of this encounter: 108.6 kg (239 lb 6.7 oz).   RESP: lungs clear to auscultation - no rales, rhonchi or wheezes  CV: normal S1 S2, no S3 or S4    IMPRESSION   ASA Class 3 - Severe systemic disease, but not incapacitating      Feliciano Emmanuel MD                "

## 2018-01-06 NOTE — ED PROVIDER NOTES
"  History     Chief Complaint:  Swallowed Foreign Body    HPI   Nevin Alvarado is a 27-year-old female who presents for evaluation after she reportedly swallowed two opened mickie pins just prior to arrival. The patient has a history of swallowing non-food objects such as springs. The patient reports that she attempted to call [facility] prior to consuming the mickie pins but the line was busy and she states that she had the urge to swallow something so she did. The patient denies any intent to harm herself, she simply felt like swallowing something. She now complains of some mid-left epigastric pain. She has had no nausea, vomiting, shortness of breath.     Allergies:  Penicillins--Anaphylaxis   Augmentin--Swelling   Blood-Group Specific Substance  Hydrocodone  Influenza Vaccines  Oseltamivir  Vicodin [Hydrocodone-Acetaminophen]  Cefazolin  Cephalosporins     Medications:    Abilify   Lamictal   Pristiq   Mirena     Past Medical History:    ADD   Anorexia nervosa with bulimia   Anxiety   Borderline personality disorder   Depression   Migraine without aura   Morbid obesity   PTSD   Rectal foreign body and swallowed foreign bodies - recurrent issue     Past Surgical History:    EGD, multiple   Knee surgery   Endometriosis ablation   Mammoplasty reduction   Carpal tunnel release     Family History:    Cerebrovascular disease     Social History:  Marital Status: Single  Presents to the ED alone  Tobacco Use: Never  Alcohol Use: No   PCP: Sandra Ramos   Lives in independent group home      Review of Systems   Respiratory: Negative for shortness of breath.    Gastrointestinal: Positive for abdominal pain. Negative for diarrhea, nausea and vomiting.   All other systems reviewed and are negative.    Physical Exam   First Vitals:  BP: 148/97  Heart Rate: 86  Temp: 97.8  F (36.6  C)  Resp: 18  Height: 154.9 cm (5' 1\")  Weight: 108.6 kg (239 lb 6.7 oz)  SpO2: 95 %    Physical Exam  Nursing note and vitals " reviewed.  Constitutional: Cooperative.   HENT:   Mouth/Throat: Mucous membranes are normal.   Cardiovascular: Normal rate, regular rhythm and normal heart sounds.  No murmur.  Pulmonary/Chest: Effort normal and breath sounds normal. No respiratory distress. No wheezes. No rales.   Abdominal: Soft. Normal appearance and bowel sounds are normal. No distension. There is no tenderness. There is no rigidity and no guarding.   Neurological: Alert.   Skin: Skin is warm and dry.    Psychiatric: blunted mood and affect. Denies suicidal ideation.     Emergency Department Course   Imaging:  Abdomen XR 1 View  1. An ingested mickie pin projects over the mid abdomen. At this  location it could be within the gastric body, duodenum, jejunum or  transverse colon.  2. A short intermediate density linear structure projecting over the  pelvis. This is not well visualized on this study due to its  orientation, but most likely represents an intrauterine device as one  was visualized in a similar location on the comparison study dated  12/22/2017. A second ingested foreign body cannot be entirely  excluded.  3. No evidence of bowel obstruction.  Report per radiology: Tim Lomas MD (01/06/18 02:27:40)    XR Chest 1 View  1. An ingested mickie pin projects over the mid abdomen. At this  location it could be within the gastric body, duodenum, jejunum or  transverse colon.  2. A short intermediate density linear structure projecting over the  pelvis. This is not well visualized on this study due to its  orientation, but most likely represents an intrauterine device as one  was visualized in a similar location on the comparison study dated  12/22/2017. A second ingested foreign body cannot be entirely  excluded.  3. No evidence of bowel obstruction.  Report per radiology: Tim Lomas MD (01/06/18 02:27:40)    Radiographic findings were communicated with the patient who voiced understanding of the  findings.    Procedures:      Sedation:      PERFORMED BY: Siddharth Soler MD     Pre-Procedure Assessment done at 0100.    EXPECTED LEVEL:  Deep Sedation    INDICATION:  Sedation is required to allow for Foreign body removal    Consent obtained from patient after discussing the risks, benefits and alternatives.    PO INTAKE: Appropriately NPO for procedure    ASA CLASS: Class 2 - MILD SYSTEMIC DISEASE, NO ACUTE PROBLEMS, NO FUNCTIONAL LIMITATIONS.    MALLAMPATI: Grade 2:  Soft palate, base of uvula, tonsillar pillars, and portion of posterior pharyngeal wall visible    LUNGS: Lungs Clear with good breath sounds bilaterally.     HEART: Normal heart sounds and rate    History and physical reviewed and no updates needed. I have reviewed the lab findings, diagnostic data, medications, and the plan for sedation. I have determined this patient to be an appropriate candidate for the planned sedation and procedure and have reassessed the patient IMMEDIATELY PRIOR to sedation and procedure.    Sedation Post Procedure Summary:    Prior to the start of the procedure and with procedural staff participation, I verbally confirmed the patient s identity using two indicators, relevant allergies, that the procedure was appropriate and matched the consent or emergent situation, and that the correct equipment/implants were available. Immediately prior to starting the procedure I conducted the Time Out with the procedural staff and re-confirmed the patient s name, procedure, and site/side. (The Joint Commission universal protocol was followed.)  Yes      SEDATIVES: Propofol    Vital signs, airway, End Tidal CO2 and pulse oximetry were monitored and remained stable throughout the procedure and sedation was maintained until the procedure was complete.  The patient was monitored by staff until sedation discharge criteria were met.    PATIENT TOLERANCE: Patient tolerated the procedure well with no immediate complications.    TIME OF SEDATION  IN MINUTES:  15 minutes from beginning to end of physician one to one monitoring.      Interventions:  Medications   Benzocaine (HURRICAINE/TOPEX) 20 % spray (1 applicator Mouth/Throat Given 1/6/18 0117)   ondansetron (ZOFRAN) injection 4 mg (4 mg Intravenous Given 1/6/18 0139)     Emergency Department Course:  Nursing notes and vitals reviewed.    (2305) I entered the room with my scribe, obtained the history, and performed an exam of the patient as documented above.    The patient was sent for an abdominal x-ray while in the emergency department, findings above.      (2339) I consulted with Dr. Ramirez of Gastroenterology regarding the patient. Was told to refer to [facility] as the patient has been seen by them multiple times for similar issues.        (2349) I consulted with Dr. Emmanuel of [facility] regarding the patient. He agreed to come in to remove the alberto pins.     (2358) I went to update the patient on the plan.      A peripheral IV was established.     (0117) I performed a sedation as documented above.     (0238) I checked in on the patient who was resting comfortably. Was able to ambulate.     Findings and plan explained to the patient. Patient discharged home with instructions regarding supportive care, medications, and reasons to return. The importance of close follow-up was reviewed.      Impression & Plan    Medical Decision Making:  Nevin Alvarado is a 27-year-old female with mental health disorder where she recurrently swallows foreign bodies as well as places them in her rectum.  She has swallowed 2 straightened out Alberto pins this evening.  No evidence of perforation on the x-ray.  [facility] has been kind enough to come in to perform endoscopy for foreign body removal.  We will do this under sedation.  Assuming no complications and good recovery she will be discharged home to her group home.  She denies suicidality.  This has been reviewed with inpatient psychiatry in the past.   I see no reason to readmit her.  In fact, re-admission for this may encourage repeat events for secondary gain.  She contracts for safety and does not feel that she is at risk for doing this again this evening.    Addendum: I was there throughout the entirety of the sedation and foreign body removal.  I did witness the GI physician remove 2 Alberto pins both extended longitudinally.  There was one area of the stomach where there appeared to be a small bleed but no obvious perforation.  No complications with the procedure.  Will recover her in the emergency department and anticipate discharge home.    Diagnosis:    ICD-10-CM   1. Swallowed foreign body, initial encounter T18.9XXA     Disposition:  discharged to home        [facility] EMERGENCY DEPARTMENT      Scribe disclosure:  I, Nadir Reddy, am serving as a scribe on 1/5/2018 at 11:05 PM to personally document services performed by Dr. Soler based on my observations and the provider's statements to me.                Siddharth Soler MD  01/06/18 0709

## 2018-01-06 NOTE — ED NOTES
"Pt reports hx of swallowing non food items such as springs.  Pt reports 2030 eating 2 mickie pins. Pt states she attempted to call [facility] prior to consuming the mickie pins, but line was busy. Pt denies suicidal thoughts and was not doing this to hurt herself. Pt is seeing psychiatrist for this condition and has a plan in order, but pt reports \"It's not working.\" ABC in tact. A/OX4    "

## 2018-01-06 NOTE — DISCHARGE INSTRUCTIONS
Swallowed Foreign Body (Adult)  Indigestible objects (foreign bodies) are sometimes swallowed by adults. Whether or not the object moves all the way through the digestive tract depends on many factors. This includes the size and shape of the object, whether the object is sharp and pointy, and what the object is made of.  Based on your evaluation, no treatment is needed at this time. The swallowed object is expected to move through your digestive tract and pass out of the body in the stool with no problems. This may take about 24 to 48 hours, but could take longer depending on your bowel habits. If imaging tests were done, you will be told when the results are ready and if they affect your treatment.  Home care    Follow any instructions from your provider about eating and drinking. In certain cases, you may be told to only eat soft foods and drink liquids for the first 24 to 48 hours.    You will need to check your stool each time you have a bowel movement. This is so you can confirm that the object has passed, and look for signs of bleeding. If the object does not pass, it may mean that the object is stuck somewhere along the digestive tract. In such cases, the object may need to be removed with a procedure.  Follow-up care  Follow up with your healthcare provider as advised. You will be told if further treatment is needed. In certain cases, you may need to return to have imaging tests done. Call your healthcare provider if you have any questions or concerns.  When to seek medical advice  Call your healthcare provider right away if any of these occur:    Belly pain, cramps, or swelling    Shortness of breath or coughing that won t stop    Trouble swallowing or pain with swallowing    Vomiting that won t stop    Blood in the stool (dark red or black color)    Fever of 100.4 F (38 C) or higher, or as directed by your provider  Call 911  Call 911 or return to the emergency department right away if any of these  occur:    Trouble breathing, wheezing    Trouble speaking    Unusually fast heart rate    New or worsening chest pain    Vomiting blood (red or black)    Swelling of the neck    Inability to pass stool  Date Last Reviewed: 8/1/2017 2000-2017 [employer]. [address] [place] [address], [place] [address]. All rights reserved. This information is not intended as a substitute for professional medical care. Always follow your healthcare professional's instructions.

## 2018-01-06 NOTE — ED NOTES
Assumed pt care from CARMEN Keller at this time. Pt resting on cart. Would like to walk to the bathroom and did, tolerated well. Talked with Dr. Soler regarding patient staying until 1:00pm per GI instructions. Dr. Soler did the sedation and feels that she can be discharged when she is able to walk, talk and tolerate po. Pt ambulated to the bathroom with a steady gait, voiding without problems. Pt states her mom can not come and get her until morning because she takes sleeping pills. Plan to get cab voucher with Dr. Barrera approval and will discharge patient home.

## 2018-01-06 NOTE — OR NURSING
Endo events done pt still in recovery phase ,and closely monitored by er md and flying obed castro rach ,pt disharge instruction given to flying squad rn to report and signed when discharge

## 2018-01-06 NOTE — PROGRESS NOTES
RT present for conscious sedation with endoscopy.  Monitored CO2/Sats.  Patient remained on oximask sat's high 90's.  Will wean off oxygen as able.

## 2018-01-09 LAB — UPPER GI ENDOSCOPY: NORMAL

## 2018-01-10 ENCOUNTER — NURSE TRIAGE (OUTPATIENT)
Dept: NURSING | Facility: CLINIC | Age: 28
End: 2018-01-10

## 2018-01-11 NOTE — TELEPHONE ENCOUNTER
Clinic Action Needed:No  Reason for Call: Caller reports that she swallowed mickie pins and tore her esophagus.  She was seen in [facility] on Friday/Saturday for similar situation and had endoscopy done, however she swallowed them again on Monday and was seen at [facility].  She was eating tonight and vomited up blood. She has a history of self injurious behavior, she denies having swallowed additional items tonight and denies suicidal thoughts. Advised to return to ER now for further evaluation, she does not have ride, advised 911. Caller appears to understand directives and agrees with plan.  Routed to: Not routed.    Ilana Foster, RN  [facility]

## 2018-01-15 ENCOUNTER — MEDICAL CORRESPONDENCE (OUTPATIENT)
Dept: HEALTH INFORMATION MANAGEMENT | Facility: CLINIC | Age: 28
End: 2018-01-15

## 2018-01-16 ENCOUNTER — NURSE TRIAGE (OUTPATIENT)
Dept: NURSING | Facility: CLINIC | Age: 28
End: 2018-01-16

## 2018-01-16 NOTE — TELEPHONE ENCOUNTER
Additional Information    Negative: Shock suspected (e.g., cold/pale/clammy skin, too weak to stand, low BP, rapid pulse)    Negative: Difficult to awaken or acting confused  (e.g., disoriented, slurred speech)    Negative: Sounds like a life-threatening emergency to the triager    Negative: Vomiting also present and worse than the diarrhea    Negative: [1] Blood in stool AND [2] without diarrhea    Negative: [1] SEVERE abdominal pain (e.g., excruciating) AND [2] present > 1 hour    Negative: [1] SEVERE abdominal pain AND [2] age > 60    Negative: [1] Blood in the stool AND [2] moderate or large amount of blood    Negative: Black or tarry bowel movements  (Exception: chronic-unchanged  black-grey bowel movements AND is taking iron pills or Pepto-bismol)    Negative: [1] Drinking very little AND [2] dehydration suspected (e.g., no urine > 12 hours, very dry mouth, very lightheaded)    Negative: Patient sounds very sick or weak to the triager    Negative: [1] SEVERE diarrhea (e.g., 7 or more times / day more than normal) AND [2]  age > 60 years    Negative: [1] Constant abdominal pain AND [2] present > 2 hours    Negative: [1] Fever > 103 F (39.4 C) AND [2] not able to get the fever down using Fever Care Advice    Negative: [1] SEVERE diarrhea (e.g., 7 or more times / day more than normal) AND [2] present > 24 hours (1 day)    Negative: [1] MODERATE diarrhea (e.g., 4-6 times / day more than normal) AND [2] present > 48 hours (2 days)    Negative: [1] MODERATE diarrhea (e.g., 4-6 times / day more than normal) AND [2] age > 70 years    Negative: Fever > 101 F (38.3 C)    Negative: Fever present > 3 days (72 hours)    Negative: Abdominal pain  (Exception: Pain clears with each passage of diarrhea stool)    Negative: [1] Blood in the stool AND [2] small amount of blood   (Exception: only on toilet paper. Reason: diarrhea can cause rectal irritation with blood on wiping)    Negative: [1] Mucus or pus in stool AND [2]  "present > 2 days AND [3] diarrhea is more than mild    Negative: [1] Recent antibiotic therapy (i.e., within last 2 months) AND [2] > 3 days since antibiotic was stopped    Negative: Weak immune system (e.g., HIV positive, cancer chemo, splenectomy, organ transplant, chronic steroids)    Negative: Tube feedings (e.g., nasogastric, g-tube, j-tube)    Negative: Travel to a foreign country in past month    Negative: [1] MILD (e.g., 1-3 or more stools than normal in past 24 hours) diarrhea without known cause AND [2] present >  7 days    Negative: Diarrhea is a chronic symptom (recurrent or ongoing AND present > 4 weeks)    Negative: SEVERE diarrhea (e.g., 7 or more times / day more than normal) (all triage questions negative)    MILD-MODERATE diarrhea (e.g., 1-6 times / day more than normal) (all triage questions negative)     \"I ate 2 hard boiled eggs this am and now I have diarrhea (times 3) and I am nauseated.\" denies other sx. Is urinating normally. Gave home care advice, call back if needed.    Protocols used: DIARRHEA-ADULT-    "

## 2018-01-17 ENCOUNTER — NURSE TRIAGE (OUTPATIENT)
Dept: NURSING | Facility: CLINIC | Age: 28
End: 2018-01-17

## 2018-01-18 ENCOUNTER — TRANSFERRED RECORDS (OUTPATIENT)
Dept: HEALTH INFORMATION MANAGEMENT | Facility: CLINIC | Age: 28
End: 2018-01-18

## 2018-01-31 ENCOUNTER — APPOINTMENT (OUTPATIENT)
Dept: ULTRASOUND IMAGING | Facility: CLINIC | Age: 28
End: 2018-01-31
Attending: EMERGENCY MEDICINE
Payer: MEDICARE

## 2018-01-31 ENCOUNTER — HOSPITAL ENCOUNTER (EMERGENCY)
Facility: CLINIC | Age: 28
Discharge: HOME | End: 2018-01-31
Attending: EMERGENCY MEDICINE | Admitting: EMERGENCY MEDICINE
Payer: MEDICARE

## 2018-01-31 VITALS
DIASTOLIC BLOOD PRESSURE: 71 MMHG | OXYGEN SATURATION: 96 % | WEIGHT: 240 LBS | SYSTOLIC BLOOD PRESSURE: 126 MMHG | HEART RATE: 87 BPM | RESPIRATION RATE: 16 BRPM | TEMPERATURE: 98.2 F | BODY MASS INDEX: 45.31 KG/M2 | HEIGHT: 61 IN

## 2018-01-31 DIAGNOSIS — R10.31 ABDOMINAL PAIN, RIGHT LOWER QUADRANT: ICD-10-CM

## 2018-01-31 DIAGNOSIS — R19.7 DIARRHEA, UNSPECIFIED TYPE: ICD-10-CM

## 2018-01-31 LAB
ALBUMIN UR-MCNC: NEGATIVE MG/DL
ANION GAP SERPL CALCULATED.3IONS-SCNC: 7 MMOL/L (ref 3–14)
APPEARANCE UR: ABNORMAL
BACTERIA #/AREA URNS HPF: ABNORMAL /HPF
BASOPHILS # BLD AUTO: 0.1 10E9/L (ref 0–0.2)
BASOPHILS NFR BLD AUTO: 0.7 %
BILIRUB UR QL STRIP: NEGATIVE
BUN SERPL-MCNC: 9 MG/DL (ref 7–30)
CALCIUM SERPL-MCNC: 8.9 MG/DL (ref 8.5–10.1)
CHLORIDE SERPL-SCNC: 107 MMOL/L (ref 94–109)
CO2 SERPL-SCNC: 26 MMOL/L (ref 20–32)
COLOR UR AUTO: YELLOW
CREAT SERPL-MCNC: 0.45 MG/DL (ref 0.52–1.04)
DIFFERENTIAL METHOD BLD: ABNORMAL
EOSINOPHIL # BLD AUTO: 0.7 10E9/L (ref 0–0.7)
EOSINOPHIL NFR BLD AUTO: 6.5 %
ERYTHROCYTE [DISTWIDTH] IN BLOOD BY AUTOMATED COUNT: 14 % (ref 10–15)
GFR SERPL CREATININE-BSD FRML MDRD: >90 ML/MIN/1.7M2
GLUCOSE SERPL-MCNC: 118 MG/DL (ref 70–99)
GLUCOSE UR STRIP-MCNC: NEGATIVE MG/DL
HCG UR QL: NEGATIVE
HCT VFR BLD AUTO: 35.6 % (ref 35–47)
HEMOCCULT STL QL: NEGATIVE
HGB BLD-MCNC: 11.5 G/DL (ref 11.7–15.7)
HGB UR QL STRIP: NEGATIVE
IMM GRANULOCYTES # BLD: 0 10E9/L (ref 0–0.4)
IMM GRANULOCYTES NFR BLD: 0.2 %
KETONES UR STRIP-MCNC: NEGATIVE MG/DL
LEUKOCYTE ESTERASE UR QL STRIP: NEGATIVE
LYMPHOCYTES # BLD AUTO: 1.4 10E9/L (ref 0.8–5.3)
LYMPHOCYTES NFR BLD AUTO: 13.6 %
MCH RBC QN AUTO: 28.3 PG (ref 26.5–33)
MCHC RBC AUTO-ENTMCNC: 32.3 G/DL (ref 31.5–36.5)
MCV RBC AUTO: 88 FL (ref 78–100)
MONOCYTES # BLD AUTO: 0.6 10E9/L (ref 0–1.3)
MONOCYTES NFR BLD AUTO: 5.9 %
MUCOUS THREADS #/AREA URNS LPF: PRESENT /LPF
NEUTROPHILS # BLD AUTO: 7.7 10E9/L (ref 1.6–8.3)
NEUTROPHILS NFR BLD AUTO: 73.1 %
NITRATE UR QL: NEGATIVE
NRBC # BLD AUTO: 0 10*3/UL
NRBC BLD AUTO-RTO: 0 /100
PH UR STRIP: 6 PH (ref 5–7)
PLATELET # BLD AUTO: 298 10E9/L (ref 150–450)
POTASSIUM SERPL-SCNC: 3.4 MMOL/L (ref 3.4–5.3)
POTASSIUM SERPL-SCNC: 5.4 MMOL/L (ref 3.4–5.3)
RBC # BLD AUTO: 4.06 10E12/L (ref 3.8–5.2)
RBC #/AREA URNS AUTO: 2 /HPF (ref 0–2)
SODIUM SERPL-SCNC: 140 MMOL/L (ref 133–144)
SOURCE: ABNORMAL
SP GR UR STRIP: 1.03 (ref 1–1.03)
SQUAMOUS #/AREA URNS AUTO: 4 /HPF (ref 0–1)
UROBILINOGEN UR STRIP-MCNC: 0 MG/DL (ref 0–2)
WBC # BLD AUTO: 10.6 10E9/L (ref 4–11)
WBC #/AREA URNS AUTO: 2 /HPF (ref 0–2)

## 2018-01-31 PROCEDURE — 85025 COMPLETE CBC W/AUTO DIFF WBC: CPT | Performed by: EMERGENCY MEDICINE

## 2018-01-31 PROCEDURE — 81001 URINALYSIS AUTO W/SCOPE: CPT | Performed by: EMERGENCY MEDICINE

## 2018-01-31 PROCEDURE — 82272 OCCULT BLD FECES 1-3 TESTS: CPT | Performed by: EMERGENCY MEDICINE

## 2018-01-31 PROCEDURE — 81025 URINE PREGNANCY TEST: CPT | Performed by: EMERGENCY MEDICINE

## 2018-01-31 PROCEDURE — 96360 HYDRATION IV INFUSION INIT: CPT

## 2018-01-31 PROCEDURE — 80048 BASIC METABOLIC PNL TOTAL CA: CPT | Performed by: EMERGENCY MEDICINE

## 2018-01-31 PROCEDURE — 84132 ASSAY OF SERUM POTASSIUM: CPT | Performed by: EMERGENCY MEDICINE

## 2018-01-31 PROCEDURE — 99284 EMERGENCY DEPT VISIT MOD MDM: CPT | Mod: 25

## 2018-01-31 PROCEDURE — 76856 US EXAM PELVIC COMPLETE: CPT

## 2018-01-31 PROCEDURE — 25000132 ZZH RX MED GY IP 250 OP 250 PS 637: Mod: GY | Performed by: EMERGENCY MEDICINE

## 2018-01-31 PROCEDURE — 25000128 H RX IP 250 OP 636: Performed by: EMERGENCY MEDICINE

## 2018-01-31 PROCEDURE — 36415 COLL VENOUS BLD VENIPUNCTURE: CPT | Performed by: EMERGENCY MEDICINE

## 2018-01-31 PROCEDURE — A9270 NON-COVERED ITEM OR SERVICE: HCPCS | Mod: GY | Performed by: EMERGENCY MEDICINE

## 2018-01-31 RX ORDER — OXYCODONE HYDROCHLORIDE 5 MG/1
5 TABLET ORAL ONCE
Status: COMPLETED | OUTPATIENT
Start: 2018-01-31 | End: 2018-01-31

## 2018-01-31 RX ADMIN — OXYCODONE HYDROCHLORIDE 5 MG: 5 TABLET ORAL at 21:04

## 2018-01-31 RX ADMIN — SODIUM CHLORIDE 1000 ML: 9 INJECTION, SOLUTION INTRAVENOUS at 21:04

## 2018-01-31 ASSESSMENT — ENCOUNTER SYMPTOMS
FEVER: 0
APPETITE CHANGE: 1
BLOOD IN STOOL: 1
CONSTIPATION: 0
VOMITING: 0
NAUSEA: 0
ABDOMINAL PAIN: 1
DIARRHEA: 1

## 2018-01-31 NOTE — ED AVS SNAPSHOT
[facility] Emergency Department    [address]    [facility] [address]    Phone:  [phone]    Fax:  [phone]                                       Nevin Alvarado   MRN: [id number]    Department:  [facility] Emergency Department   Date of Visit:  1/31/2018           After Visit Summary Signature Page     I have received my discharge instructions, and my questions have been answered. I have discussed any challenges I see with this plan with the nurse or doctor.    ..........................................................................................................................................  Patient/Patient Representative Signature      ..........................................................................................................................................  Patient Representative Print Name and Relationship to Patient    ..................................................               ................................................  Date                                            Time    ..........................................................................................................................................  Reviewed by Signature/Title    ...................................................              ..............................................  Date                                                            Time

## 2018-01-31 NOTE — ED AVS SNAPSHOT
[facility] Emergency Department    [address] [place] [address]    Phone:  [phone]    Fax:  [phone]                                       Nevin Alvarado   MRN: [id number]    Department:  [facility] Emergency Department   Date of Visit:  1/31/2018           Patient Information     Date Of Birth          1990        Your diagnoses for this visit were:     Abdominal pain, right lower quadrant     Diarrhea, unspecified type        You were seen by Louann Mullen MD.      Follow-up Information     Follow up with Latonya Knight MD In 2 days.    Specialty:  Family Practice    Contact information:    [address] [id number] [phone]          Discharge Instructions       Discharge Instructions  Chronic Pain  You were seen today for an issue regarding chronic or recurrent pain. You may have a condition that gives you pain every day, or a condition that causes pain that keeps coming back, or several conditions involving pain.   Many patients with chronic or recurrent pain come to the Emergency Department thinking that a shot of narcotic pain medicine or a prescription for pain pills to take home is the best answer to their problem. We have discovered, though, that these approaches put our patients at high risk of long-term problems. This sort of treatment may actually make your pain worse, make it harder to control, and put you at an unacceptable risk of complications.   Because of the risks, we are very hesitant to treat your type of pain with short-acting or potentially habit-forming medications. These medications represent a significant risk to your health, and need to be managed by a physician who can follow your care consistently.  We have established a policy for the treatment of patients with chronic or recurrent pain that does not allow for treatment with injection narcotics or take-home prescriptions for  narcotics.  We will treat your symptoms with non-narcotic medications and other treatments, and we will make referrals to pain specialists or other specialized providers if we think such referrals would benefit you.  You should expect to receive treatment consistent with this policy during future visits.    If you have concerns about our policy, please discuss them with your primary care provider or pain specialist, who can contact us if necessary for further information or clarification.  If you were given a prescription for medicine here today, be sure to read all of the information (including the package insert) that comes with your prescription.  This will include important information about the medicine, its side effects, and any warnings that you need to know about.  The pharmacist who fills the prescription can provide more information and answer questions you may have about the medicine.  If you have questions or concerns that the pharmacist cannot address, please call or return to the Emergency Department.   Remember that you can always come back to the Emergency Department if you develop any new symptoms or if there is anything that worries you.  Discharge Instructions  Abdominal Pain    Abdominal pain can be caused by many things. Your evaluation today does not show the exact cause for your pain. Your doctor today has decided that it is unlikely your pain is due to a life threatening problem, or a problem requiring surgery or hospital admission. Sometimes those problems cannot be found right away, so it is very important that you follow up as directed.  Sometimes only the changes which occur over time allow the cause of your pain to be found.    Return to the Emergency Department for a recheck in 8-12 hours if your pain continues.  If your pain gets worse, changes in location, or feels different, return to the Emergency Department right away.    ADULTS:  Return to the Emergency Department right away  if:      You get an oral temperature above 102oF or as directed by your doctor.    You have blood in your stools (bright red or black, tarry stools).    You keep throwing up or can t drink liquids.    You see blood when you throw up.    You can t have a bowel movement or you can t pass gas.    Your stomach gets bloated or bigger.    Your skin or the whites of your eyes look yellow.    You faint.    You have bloody, frequent or painful urination.    You have new symptoms or anything that worries you.    CHILDREN:  Return to the Emergency Department right away if your child has any of the above-listed symptoms or the following:      Pushes your hand away or screams/cries when his/her belly is touched.    You notice your child is very fussy or weak.    Your child is very tired and is too tired to eat or drink.    Your child is dehydrated.  Signs of dehydration can be:  o Your infant has had no wet diapers in 4-5 hours.  o Your older child has not passed urine in 6-8 hours.  o Your infant or child starts to have dry mouth and lips, or no saliva or tears.    PREGNANT WOMEN:  Return to the Emergency Department right away if you have any of the above-listed symptoms or the following:      You have bleeding, leaking fluid or passing tissue from the vagina.    You have worse pain or cramping, or pain in your shoulder or back.    You have vomiting that will not stop.    You have painful or bloody urination.    You have a temperature of 100oF or more.    Your baby is not moving as much as usual.    You faint.    You get a bad headache with or without eye problems and abdominal pain.    You have a convulsion or seizure.    You have unusual discharge from your vagina and abdominal pain.    Abdominal pain is pretty common during pregnancy.  Your pain may or may not be related to your pregnancy. You should follow-up closely with your OB doctor so they can evaluate you and your baby.  Until you follow-up with your regular doctor,  "do the following:       Avoid sex and do not put anything in your vagina.    Drink clear fluids.    Only take medications approved by your doctor.    MORE INFORMATION:    Appendicitis:  A possible cause of abdominal pain in any person who still has their appendix is acute appendicitis. Appendicitis is often hard to diagnose.  Testing does not always rule out early appendicitis or other causes of abdominal pain. Close follow-up with your doctor and re-evaluations may be needed to figure out the reason for your abdominal pain.    Follow-up:  It is very important that you make an appointment with your clinic and go to the appointment.  If you do not follow-up with your primary doctor, it may result in missing an important development which could result in permanent injury or disability and/or lasting pain.  If there is any problem keeping your appointment, call your doctor or return to the Emergency Department.    Medications:  Take your medications as directed by your doctor today.  Before using over-the-counter medications, ask your doctor and make sure to take the medications as directed.  If you have any questions about medications, ask your doctor.    Diet:  Resume your normal diet as much as possible, but do not eat fried, fatty or spicy foods while you have pain.  Do not drink alcohol or have caffeine.  Do not smoke tobacco.    Probiotics: If you have been given an antibiotic, you may want to also take a probiotic pill or eat yogurt with live cultures. Probiotics have \"good bacteria\" to help your intestines stay healthy. Studies have shown that probiotics help prevent diarrhea and other intestine problems (including C. diff infection) when you take antibiotics. You can buy these without a prescription in the pharmacy section of the store.     If you were given a prescription for medicine here today, be sure to read all of the information (including the package insert) that comes with your prescription.  This " will include important information about the medicine, its side effects, and any warnings that you need to know about.  The pharmacist who fills the prescription can provide more information and answer questions you may have about the medicine.  If you have questions or concerns that the pharmacist cannot address, please call or return to the Emergency Department.         Opioid Medication Information    Pain medications are among the most commonly prescribed medicines, so we are including this information for all our patients. If you did not receive pain medication or get a prescription for pain medicine, you can ignore it.     You may have been given a prescription for an opioid (narcotic) pain medicine and/or have received a pain medicine while here in the Emergency Department. These medicines can make you drowsy or impaired. You must not drive, operate dangerous equipment, or engage in any other dangerous activities while taking these medications. If you drive while taking these medications, you could be arrested for DUI, or driving under the influence. Do not drink any alcohol while you are taking these medications.     Opioid pain medications can cause addiction. If you have a history of chemical dependency of any type, you are at a higher risk of becoming addicted to pain medications.  Only take these prescribed medications to treat your pain when all other options have been tried. Take it for as short a time and as few doses as possible. Store your pain pills in a secure place, as they are frequently stolen and provide a dangerous opportunity for children or visitors in your house to start abusing these powerful medications. We will not replace any lost or stolen medicine.  As soon as your pain is better, you should flush all your remaining medication.     Many prescription pain medications contain Tylenol  (acetaminophen), including Vicodin , Tylenol #3 , Norco , Lortab , and Percocet .  You should not take  any extra pills of Tylenol  if you are using these prescription medications or you can get very sick.  Do not ever take more than 3000 mg of acetaminophen in any 24 hour period.    All opioids tend to cause constipation. Drink plenty of water and eat foods that have a lot of fiber, such as fruits, vegetables, prune juice, apple juice and high fiber cereal.  Take a laxative if you don t move your bowels at least every other day. Miralax , Milk of Magnesia, Colace , or Senna  can be used to keep you regular.      Remember that you can always come back to the Emergency Department if you are not able to see your regular doctor in the amount of time listed above, if you get any new symptoms, or if there is anything that worries you.        24 Hour Appointment Hotline       To make an appointment at any [facility], call [phone] ([phone]). If you don't have a family doctor or clinic, we will help you find one. [facility] clinics are conveniently located to serve the needs of you and your family.             Review of your medicines      Our records show that you are taking the medicines listed below. If these are incorrect, please call your family doctor or clinic.        Dose / Directions Last dose taken    ABILIFY 10 MG tablet   Dose:  10 mg   Generic drug:  ARIPiprazole        Take 10 mg by mouth every morning   Refills:  0        LAMICTAL 100 MG tablet   Dose:  150 mg   Generic drug:  lamoTRIgine        Take 150 mg by mouth every morning   Refills:  0        levonorgestrel 20 MCG/24HR IUD   Commonly known as:  MIRENA   Dose:  1 each        1 each (20 mcg) by Intrauterine route once for 1 dose   Refills:  0        PRISTIQ PO   Dose:  100 mg        Take 100 mg by mouth every morning   Refills:  0        VITAMIN D3 PO   Dose:  5000 Units        Take 5,000 Units by mouth every morning   Refills:  0                Procedures and tests performed during your visit     Basic metabolic panel    CBC with  platelets differential    HCG qualitative urine (UPT)    Potassium    Stool: occult blood    UA with Microscopic    US Pel W/Trans*      Orders Needing Specimen Collection     None      Pending Results     Date and Time Order Name Status Description    1/31/2018 2028 US Pel W/Trans* Preliminary             Pending Culture Results     No orders found from 1/29/2018 to 2/1/2018.            Pending Results Instructions     If you had any lab results that were not finalized at the time of your Discharge, you can call the ED Lab Result RN at [phone]. You will be contacted by this team for any positive Lab results or changes in treatment. The nurses are available 7 days a week from 10A to 6:30P.  You can leave a message 24 hours per day and they will return your call.        Test Results From Your Hospital Stay        1/31/2018  7:00 PM      Component Results     Component Value Ref Range & Units Status    WBC 10.6 4.0 - 11.0 10e9/L Final    RBC Count 4.06 3.8 - 5.2 10e12/L Final    Hemoglobin 11.5 (L) 11.7 - 15.7 g/dL Final    Hematocrit 35.6 35.0 - 47.0 % Final    MCV 88 78 - 100 fl Final    MCH 28.3 26.5 - 33.0 pg Final    MCHC 32.3 31.5 - 36.5 g/dL Final    RDW 14.0 10.0 - 15.0 % Final    Platelet Count 298 150 - 450 10e9/L Final    Diff Method Automated Method  Final    % Neutrophils 73.1 % Final    % Lymphocytes 13.6 % Final    % Monocytes 5.9 % Final    % Eosinophils 6.5 % Final    % Basophils 0.7 % Final    % Immature Granulocytes 0.2 % Final    Nucleated RBCs 0 0 /100 Final    Absolute Neutrophil 7.7 1.6 - 8.3 10e9/L Final    Absolute Lymphocytes 1.4 0.8 - 5.3 10e9/L Final    Absolute Monocytes 0.6 0.0 - 1.3 10e9/L Final    Absolute Eosinophils 0.7 0.0 - 0.7 10e9/L Final    Absolute Basophils 0.1 0.0 - 0.2 10e9/L Final    Abs Immature Granulocytes 0.0 0 - 0.4 10e9/L Final    Absolute Nucleated RBC 0.0  Final         1/31/2018  7:20 PM      Component Results     Component Value Ref Range & Units Status     Sodium 140 133 - 144 mmol/L Final    Potassium 5.4 (H) 3.4 - 5.3 mmol/L Final    Specimen moderately hemolyzed, Potassium may be falsely elevated    Chloride 107 94 - 109 mmol/L Final    Carbon Dioxide 26 20 - 32 mmol/L Final    Anion Gap 7 3 - 14 mmol/L Final    Glucose 118 (H) 70 - 99 mg/dL Final    Urea Nitrogen 9 7 - 30 mg/dL Final    Creatinine 0.45 (L) 0.52 - 1.04 mg/dL Final    GFR Estimate >90 >60 mL/min/1.7m2 Final    Non  GFR Calc    GFR Estimate If Black >90 >60 mL/min/1.7m2 Final    African American GFR Calc    Calcium 8.9 8.5 - 10.1 mg/dL Final         1/31/2018  7:22 PM      Component Results     Component Value Ref Range & Units Status    Color Urine Yellow  Final    Appearance Urine Slightly Cloudy  Final    Glucose Urine Negative NEG^Negative mg/dL Final    Bilirubin Urine Negative NEG^Negative Final    Ketones Urine Negative NEG^Negative mg/dL Final    Specific Gravity Urine 1.026 1.003 - 1.035 Final    Blood Urine Negative NEG^Negative Final    pH Urine 6.0 5.0 - 7.0 pH Final    Protein Albumin Urine Negative NEG^Negative mg/dL Final    Urobilinogen mg/dL 0.0 0.0 - 2.0 mg/dL Final    Nitrite Urine Negative NEG^Negative Final    Leukocyte Esterase Urine Negative NEG^Negative Final    Source Midstream Urine  Final    WBC Urine 2 0 - 2 /HPF Final    RBC Urine 2 0 - 2 /HPF Final    Bacteria Urine Few (A) NEG^Negative /HPF Final    Squamous Epithelial /HPF Urine 4 (H) 0 - 1 /HPF Final    Mucous Urine Present (A) NEG^Negative /LPF Final         1/31/2018  7:22 PM      Component Results     Component Value Ref Range & Units Status    HCG Qual Urine Negative NEG^Negative Final    This test is for screening purposes.  Results should be interpreted along with   the clinical picture.  Confirmation testing is available if warranted by   ordering [id number], HCG Quantitative Pregnancy.                 1/31/2018  9:50 PM      Narrative     ULTRASOUND PELVIS WITH TRANSVAGINAL IMAGING AND DOPPLER    1/31/2018  9:41 PM     HISTORY: Right lower quadrant pain.    COMPARISON: None.    TECHNIQUE: Endovaginal imaging was also performed to better evaluate  the ovaries. Spectral waveform and color Doppler evaluation were  performed of the ovaries.    FINDINGS: The uterus is anteflexed, measuring 8.3 x 3.6 x 4.7 cm in  long axis, short axis and transverse dimensions respectively. No  myometrial masses. An intrauterine device is present in a usual  location in the endometrial cavity. The endometrial stripe measures  0.5 cm in thickness. The right and left ovaries measure 5.1 x 3.9 x  3.5 cm and 3.1 x 2.2 x 1.7 cm respectively. A 4.4 x 3.2 x 2.9 cm  simple cyst is present in the right ovary. The left ovary is  unremarkable. Blood flow is visualized in both ovaries. No adnexal  masses. A trace amount of simple-appearing free fluid in the pelvis.        Impression     IMPRESSION:   1. 4 cm simple cyst in the right ovary. This is nonspecific, but  likely a functional cyst.  2. Otherwise, unremarkable appearance of the ovaries. Blood flow is  visualized in both ovaries.  3. An intrauterine device is present in a usual location in the  endometrial cavity.  4. A trace amount of nonspecific free fluid in the pelvis.         1/31/2018 10:26 PM      Component Results     Component Value Ref Range & Units Status    Potassium 3.4 3.4 - 5.3 mmol/L Final         1/31/2018 10:28 PM      Component Results     Component Value Ref Range & Units Status    Occult Blood Negative NEG^Negative Final                Clinical Quality Measure: Blood Pressure Screening     Your blood pressure was checked while you were in the emergency department today. The last reading we obtained was  BP: 139/69 . Please read the guidelines below about what these numbers mean and what you should do about them.  If your systolic blood pressure (the top number) is less than 120 and your diastolic blood pressure (the bottom number) is less than 80, then your blood  pressure is normal. There is nothing more that you need to do about it.  If your systolic blood pressure (the top number) is 120-139 or your diastolic blood pressure (the bottom number) is 80-89, your blood pressure may be higher than it should be. You should have your blood pressure rechecked within a year by a primary care provider.  If your systolic blood pressure (the top number) is 140 or greater or your diastolic blood pressure (the bottom number) is 90 or greater, you may have high blood pressure. High blood pressure is treatable, but if left untreated over time it can put you at risk for heart attack, stroke, or kidney failure. You should have your blood pressure rechecked by a primary care provider within the next 4 weeks.  If your provider in the emergency department today gave you specific instructions to follow-up with your doctor or provider even sooner than that, you should follow that instruction and not wait for up to 4 weeks for your follow-up visit.        Thank you for choosing [facility]       Thank you for choosing [facility] for your care. Our goal is always to provide you with excellent care. Hearing back from our patients is one way we can continue to improve our services. Please take a few minutes to complete the written survey that you may receive in the mail after you visit with us. Thank you!        [employer] Information     [employer] gives you secure access to your electronic health record. If you see a primary care provider, you can also send messages to your care team and make appointments. If you have questions, please call your primary care clinic.  If you do not have a primary care provider, please call [phone] and they will assist you.        Care EveryWhere ID     This is your Care EveryWhere ID. This could be used by other organizations to access your [facility] medical records  [id number]        Equal Access to Services     ZENON DIAMOND AH: erick Craig  taylor singh waxay idiin hayaan adeeg kharash la'aan ah. So [facility] [phone].    ATENCIÓN: Si habla ashtyn, tiene a singh disposición servicios gratuitos de asistencia lingüística. Llame al [phone].    We comply with applicable federal civil rights laws and [place] laws. We do not discriminate on the basis of race, color, national origin, age, disability, sex, sexual orientation, or gender identity.            After Visit Summary       This is your record. Keep this with you and show to your community pharmacist(s) and doctor(s) at your next visit.

## 2018-02-01 ENCOUNTER — CARE COORDINATION (OUTPATIENT)
Dept: CARE COORDINATION | Facility: CLINIC | Age: 28
End: 2018-02-01

## 2018-02-01 NOTE — DISCHARGE INSTRUCTIONS
Discharge Instructions  Chronic Pain  You were seen today for an issue regarding chronic or recurrent pain. You may have a condition that gives you pain every day, or a condition that causes pain that keeps coming back, or several conditions involving pain.   Many patients with chronic or recurrent pain come to the Emergency Department thinking that a shot of narcotic pain medicine or a prescription for pain pills to take home is the best answer to their problem. We have discovered, though, that these approaches put our patients at high risk of long-term problems. This sort of treatment may actually make your pain worse, make it harder to control, and put you at an unacceptable risk of complications.   Because of the risks, we are very hesitant to treat your type of pain with short-acting or potentially habit-forming medications. These medications represent a significant risk to your health, and need to be managed by a physician who can follow your care consistently.  We have established a policy for the treatment of patients with chronic or recurrent pain that does not allow for treatment with injection narcotics or take-home prescriptions for narcotics.  We will treat your symptoms with non-narcotic medications and other treatments, and we will make referrals to pain specialists or other specialized providers if we think such referrals would benefit you.  You should expect to receive treatment consistent with this policy during future visits.    If you have concerns about our policy, please discuss them with your primary care provider or pain specialist, who can contact us if necessary for further information or clarification.  If you were given a prescription for medicine here today, be sure to read all of the information (including the package insert) that comes with your prescription.  This will include important information about the medicine, its side effects, and any warnings that you need to know about.   The pharmacist who fills the prescription can provide more information and answer questions you may have about the medicine.  If you have questions or concerns that the pharmacist cannot address, please call or return to the Emergency Department.   Remember that you can always come back to the Emergency Department if you develop any new symptoms or if there is anything that worries you.  Discharge Instructions  Abdominal Pain    Abdominal pain can be caused by many things. Your evaluation today does not show the exact cause for your pain. Your doctor today has decided that it is unlikely your pain is due to a life threatening problem, or a problem requiring surgery or hospital admission. Sometimes those problems cannot be found right away, so it is very important that you follow up as directed.  Sometimes only the changes which occur over time allow the cause of your pain to be found.    Return to the Emergency Department for a recheck in 8-12 hours if your pain continues.  If your pain gets worse, changes in location, or feels different, return to the Emergency Department right away.    ADULTS:  Return to the Emergency Department right away if:      You get an oral temperature above 102oF or as directed by your doctor.    You have blood in your stools (bright red or black, tarry stools).    You keep throwing up or can t drink liquids.    You see blood when you throw up.    You can t have a bowel movement or you can t pass gas.    Your stomach gets bloated or bigger.    Your skin or the whites of your eyes look yellow.    You faint.    You have bloody, frequent or painful urination.    You have new symptoms or anything that worries you.    CHILDREN:  Return to the Emergency Department right away if your child has any of the above-listed symptoms or the following:      Pushes your hand away or screams/cries when his/her belly is touched.    You notice your child is very fussy or weak.    Your child is very tired and  is too tired to eat or drink.    Your child is dehydrated.  Signs of dehydration can be:  o Your infant has had no wet diapers in 4-5 hours.  o Your older child has not passed urine in 6-8 hours.  o Your infant or child starts to have dry mouth and lips, or no saliva or tears.    PREGNANT WOMEN:  Return to the Emergency Department right away if you have any of the above-listed symptoms or the following:      You have bleeding, leaking fluid or passing tissue from the vagina.    You have worse pain or cramping, or pain in your shoulder or back.    You have vomiting that will not stop.    You have painful or bloody urination.    You have a temperature of 100oF or more.    Your baby is not moving as much as usual.    You faint.    You get a bad headache with or without eye problems and abdominal pain.    You have a convulsion or seizure.    You have unusual discharge from your vagina and abdominal pain.    Abdominal pain is pretty common during pregnancy.  Your pain may or may not be related to your pregnancy. You should follow-up closely with your OB doctor so they can evaluate you and your baby.  Until you follow-up with your regular doctor, do the following:       Avoid sex and do not put anything in your vagina.    Drink clear fluids.    Only take medications approved by your doctor.    MORE INFORMATION:    Appendicitis:  A possible cause of abdominal pain in any person who still has their appendix is acute appendicitis. Appendicitis is often hard to diagnose.  Testing does not always rule out early appendicitis or other causes of abdominal pain. Close follow-up with your doctor and re-evaluations may be needed to figure out the reason for your abdominal pain.    Follow-up:  It is very important that you make an appointment with your clinic and go to the appointment.  If you do not follow-up with your primary doctor, it may result in missing an important development which could result in permanent injury or  "disability and/or lasting pain.  If there is any problem keeping your appointment, call your doctor or return to the Emergency Department.    Medications:  Take your medications as directed by your doctor today.  Before using over-the-counter medications, ask your doctor and make sure to take the medications as directed.  If you have any questions about medications, ask your doctor.    Diet:  Resume your normal diet as much as possible, but do not eat fried, fatty or spicy foods while you have pain.  Do not drink alcohol or have caffeine.  Do not smoke tobacco.    Probiotics: If you have been given an antibiotic, you may want to also take a probiotic pill or eat yogurt with live cultures. Probiotics have \"good bacteria\" to help your intestines stay healthy. Studies have shown that probiotics help prevent diarrhea and other intestine problems (including C. diff infection) when you take antibiotics. You can buy these without a prescription in the pharmacy section of the store.     If you were given a prescription for medicine here today, be sure to read all of the information (including the package insert) that comes with your prescription.  This will include important information about the medicine, its side effects, and any warnings that you need to know about.  The pharmacist who fills the prescription can provide more information and answer questions you may have about the medicine.  If you have questions or concerns that the pharmacist cannot address, please call or return to the Emergency Department.         Opioid Medication Information    Pain medications are among the most commonly prescribed medicines, so we are including this information for all our patients. If you did not receive pain medication or get a prescription for pain medicine, you can ignore it.     You may have been given a prescription for an opioid (narcotic) pain medicine and/or have received a pain medicine while here in the Emergency " Department. These medicines can make you drowsy or impaired. You must not drive, operate dangerous equipment, or engage in any other dangerous activities while taking these medications. If you drive while taking these medications, you could be arrested for DUI, or driving under the influence. Do not drink any alcohol while you are taking these medications.     Opioid pain medications can cause addiction. If you have a history of chemical dependency of any type, you are at a higher risk of becoming addicted to pain medications.  Only take these prescribed medications to treat your pain when all other options have been tried. Take it for as short a time and as few doses as possible. Store your pain pills in a secure place, as they are frequently stolen and provide a dangerous opportunity for children or visitors in your house to start abusing these powerful medications. We will not replace any lost or stolen medicine.  As soon as your pain is better, you should flush all your remaining medication.     Many prescription pain medications contain Tylenol  (acetaminophen), including Vicodin , Tylenol #3 , Norco , Lortab , and Percocet .  You should not take any extra pills of Tylenol  if you are using these prescription medications or you can get very sick.  Do not ever take more than 3000 mg of acetaminophen in any 24 hour period.    All opioids tend to cause constipation. Drink plenty of water and eat foods that have a lot of fiber, such as fruits, vegetables, prune juice, apple juice and high fiber cereal.  Take a laxative if you don t move your bowels at least every other day. Miralax , Milk of Magnesia, Colace , or Senna  can be used to keep you regular.      Remember that you can always come back to the Emergency Department if you are not able to see your regular doctor in the amount of time listed above, if you get any new symptoms, or if there is anything that worries you.

## 2018-02-01 NOTE — ED NOTES
Patient presents with right lower abdominal pain 3-4 days.  Patient reports having explosive dark/blackish diarrhea that started 2 days ago. ABC's intact.

## 2018-02-01 NOTE — ED NOTES
Pt returned from ultrasound, up to bathroom, unable to have bowel movement, stated she felt as if seh needed to.

## 2018-02-01 NOTE — ED PROVIDER NOTES
"  History     Chief Complaint:  Abdominal Pain and Diarrhea    HPI   Nevin Alvarado is a 27 year old female s/p rectal foreign body removal (pill bottle) on 1/19/17 and with a history of anxiety, ADD, and PTSD who presents to the emergency department today for evaluation of abdominal pain and diarrhea. Per care everywhere, the patient as seen and evaluated at [facility] two days ago on 1/29/17 for abdominal pain. At this time, she had a below imaging studies obtained. The patient reports since her discharge she has had \"bad\" diarrhea, right lower quadrant abdominal pain, and a decrease in appetite. Her abdominal pain has been worsening and had 12 episodes of stool today which she describes as \"really dark brown almost black\" with mucous.  Due to worsening symptoms, she presents to the ED for further evaluation. She has been taking tylenol and ibuprofen, last dose two hours prior to arrival at 1800. Furthermore, the patient states she is bleeding but is unable to tell if it's vaginal or rectal. She has pain in her rectum when defecating. Although she has not had a normal period in \"year\" secondary to IUD placement.The patient denies inserting rectal foreign bodies, vomiting, and fevers.      1/29/18  CT Abd Pelvis w/o Contrast  CONCLUSION:  1.  4.7 cm right adnexal cyst.  2.  No bowel obstruction or abscess. Appendix is normal.  3.  Punctate nonobstructing left renal calculus.  Report per Radiology    1/30/18  US Pelvic Complete w EV  CONCLUSION:  1.  4.2 cm right ovarian cyst. A 4.8 cm right ovarian cyst was seen on the   previous exam.  Report per Radiology     Allergies:  Penicillins  Blood-Group Specific Substance  Hydrocodone  Influenza Vaccines  Oseltamivir  Vicodin [Hydrocodone-Acetaminophen]  Cephalosporins     Medications:    ARIPiprazole (ABILIFY) 10 MG tablet  lamoTRIgine (LAMICTAL) 100 MG tablet  levonorgestrel (MIRENA) 20 MCG/24HR IUD  Desvenlafaxine Succinate (PRISTIQ " "PO)    Past Medical History:    ADD  Anorexia nervosa with bulimia  Anxiety  Borderline personality disorder  Depression   History of self harm  Migraine without aura  Morbid obesity  PTSD   Recurrent rectal foreign body  Recurrent swallowed foreign body    Past Surgical History:    EGD combined x5  Exam under anesthesia anus  Removal fecal impaction or FB w anesthesia  Knee surgery  Laparotomy exploratory  Lymph nodes removed neck, benign  Release carpal tunnel  Sigmoidectomy flexible      Family History:    Cerebrovascular disease    Social History:  The patient was alone.  Smoking Status: Never  Smokeless Tobacco: Never  Alcohol Use: No  Marital Status:  Single      Review of Systems   Constitutional: Positive for appetite change. Negative for fever.   Gastrointestinal: Positive for abdominal pain, blood in stool (possibly) and diarrhea. Negative for constipation, nausea and vomiting.   Genitourinary: Positive for vaginal bleeding (possibly ).   All other systems reviewed and are negative.    Physical Exam   First Vitals:  BP: 169/89  Pulse: 87  Temp: 98.2  F (36.8  C)  Resp: 16  Height: 154.9 cm (5' 1\")  Weight: 108.9 kg (240 lb)  SpO2: 96 %    Physical Exam  General: Patient is alert and interactive when I enter the room  Head:  The scalp, face, and head appear normal  Eyes:  Conjunctivae are normal  ENT:    The nose is normal    Pinnae are normal    External acoustic canals are normal  Neck:  Trachea midline  CV:  Pulses are normal    Resp:  No respiratory distress   Abdomen:      Soft, abdominal binder in place, mid-line incision clean/dry/intact,very mild low RLQ tenderness  Musc:  Normal muscular tone    No major joint effusions    No asymmetric leg swelling  Rectal: Hemorrhoid present, no blood or melena noted, no fissure or FB noted   Skin:  No rash or lesions noted  Neuro: Speech is normal and fluent. Face is symmetric.     Moving all extremities well.   Psych:  Awake. Alert.  Normal affect.  Appropriate " interactions.    Emergency Department Course     Imaging:  Radiology findings were communicated with the patient who voiced understanding of the findings.  US Pel w/Trans  IMPRESSION:   1. 4 cm simple cyst in the right ovary. This is nonspecific, but  likely a functional cyst.  2. Otherwise, unremarkable appearance of the ovaries. Blood flow is  visualized in both ovaries.  3. An intrauterine device is present in a usual location in the  endometrial cavity.  4. A trace amount of nonspecific free fluid in the pelvis.  Report per radiology     Laboratory:  Laboratory findings were communicated with the patient who voiced understanding of the findings.  Potassium: 3.4  Stool Occult Blood: Negative    CBC: HGB 11.5 (L) o/w WNL. (WBC 10.6, )   BMP: Potassium 5.4 (H), Creatinine 0.45 (L), Glucose 118 (H) o/w WNL   UA: slightly cloudy yellow urine, squamous epithelial 4 (H), few bacteria, mucous present o/w WNL  HCG Qualitative Urine: Negative     Interventions:  2104 Oxycodone 5mg PO  2104 NS Bolus 1,000mL IV     Emergency Department Course:  Nursing notes and vitals reviewed.  The patient was sent for a US Pel w/Trans while in the emergency department, results above.   IV was inserted and blood was drawn for laboratory testing, results above.  The patient provided a urine sample here in the emergency department. This was sent for laboratory testing, findings above.  1830: I performed an exam of the patient as documented above.   2315: Patient rechecked and updated.   Findings and plan explained to the Patient. Patient discharged home with instructions regarding supportive care, medications, and reasons to return. The importance of close follow-up was reviewed.  I personally reviewed the laboratory and imaging results with the Patient and answered all related questions prior to discharge.    Impression & Plan      Medical Decision Making:  Nevin Alvarado is a 27 year old female with a history of chronic  pain, psychiatric disease, and foreign body insertions who presents with diarrhea, melena, right lower quadrant pain. Patient is a very prolonged history of both surgical, psychiatric history that involves placing foreign bodies in her rectum. She was actually seen on the 19th after her surgery at [facility] and had a negative CT and ultrasound except for 4.7 ovarian cyst. Her pain seems similar to what she's had since the surgery and when she was seen.  She does not have any concerning findings. Her blood work here was unremarkable. Her abdomen is quite benign and her incisions are not painful. I did do a rectal exam and there is actually minimal to no stool. I was able to get just enough for a stool occult and this was negative. I gave her 5mg of oxycodone here. I do not think she needs repeat CT imaging as her pain has been ongoing and she has had 30 CT's in her lifetime. I did obtain an ultrasound to make sure there is good ovarian flow. She does have a 4cm ovarian cyst but good flow and no other findings. Given that she otherwise appears well and has been seen multiple times,  I think she can go home with close follow up and should follow up with both OB/GYN and her primary care doctor. Patient discharged.     Diagnosis:    ICD-10-CM    1. Abdominal pain, right lower quadrant R10.31    2. Diarrhea, unspecified type R19.7      Disposition:  Discharged to home    Scribe Disclosure:  I, Elke Espinoza, am serving as a scribe at 8:17 PM on 1/31/2018 to document services personally performed by Louann Mullen MD based on my observations and the provider's statements to me.     1/31/2018   [facility] EMERGENCY DEPARTMENT       Louann Mullen MD  02/01/18 [id number]

## 2018-02-01 NOTE — PROGRESS NOTES
"Clinic Care Coordination Contact  [facility]/Voicemail    Clinical Data: ER follow up for 1/31 for \"bdominal pain and diarrhea\" after removal of a pill bottle on 1/19  Patient has history of ingesting foreign objects. Multiple ED visits. Has multiple resources in place in the communit    Recommended to follow up with PCP or OBGYN    Outreach attempted x 1.  Left message on voicemail with call back information and requested return call.  Plan: Care coordinator will try again in 1-2 business days.    Nevin Patterson, Social Work Care Coordinator, [facility], [facility]: [facility], and [place]   P:[phone] / Signed February 1, 2018       "

## 2018-02-02 NOTE — PROGRESS NOTES
Clinic Care Coordination Contact  Care Team Conversations    CCSW called the patient. She states she is no longer a [facility] patient. Is seeing an [facility] provider but does not wish to disclose the name of the provider.    Does state her pain level is reduced and she is feeling better since her ER visit.     This CCSW will not continue to follow as this patient does not have a [facility] primary. Updated her careplan and care team.    Nevin Patterson, Social Work Care Coordinator, [facility], MSW  [facility]: [facility]  P:[phone]/ Signed February 2, 2018

## 2018-02-03 ENCOUNTER — NURSE TRIAGE (OUTPATIENT)
Dept: NURSING | Facility: CLINIC | Age: 28
End: 2018-02-03

## 2018-02-03 NOTE — TELEPHONE ENCOUNTER
Additional Information    Negative: [1] Major abdominal surgical incision AND [2] wound gaping open AND [3] visible internal organs    Negative: Sounds like a life-threatening emergency to the triager    Negative: [1] Bleeding from incision AND [2] won't stop after 10 minutes of direct pressure    Negative: [1] Widespread rash AND [2] bright red, sunburn-like    Negative: Severe pain in the incision    Negative: [1] Suture came out early AND [2] wound gaping AND [3] < 48 hours since sutures placed    Negative: [1] Incision gaping open AND [2] length of opening > 2 inches (5 cm)    Negative: Patient sounds very sick or weak to the triager    Negative: Sounds like a serious complication to the triager    Negative: Fever > 100.5 F (38.1 C)    Negative: [1] Incision looks infected (spreading redness, pain) AND [2] fever > 99.5 F (37.5 C)    Negative: [1] Incision looks infected (spreading redness, pain) AND [2] large red area (> 2 in. or 5 cm)    Negative: [1] Incision looks infected (spreading redness, pain) AND [2] face wound    Negative: [1] Red streak runs from the incision AND [2] longer than 1 inch (2.5 cm)    Negative: [1] Pus or bad-smelling fluid draining from incision AND [2] no fever    Negative: [1] Post-op pain AND [2] not controlled with pain medications    Negative: Dressing soaked with blood or body fluid (e.g., drainage)    Sutured or stapled surgical incision, questions about    Negative: [1] Small red area or streak AND [2] no fever    Negative: [1] Clear or blood-tinged fluid draining from wound AND [2] no fever    Negative: [1] 48 hours since surgery AND [2] wound is becoming more tender    Negative: [1] Incision gaping open AND [2] on the face AND [3] > 48 hours since sutures placed    Negative: [1] Incision gaping open AND [3] length of opening > 1/2 inch (1 cm) AND [3] > 48 hours since sutures placed    Negative: Suture or staple removal is overdue    Negative: [1] Suture or staple came out  early AND [2] caller wants wound checked    Negative: Caller has NON-URGENT question and triager unable to answer question    Negative: Pimple where a stitch comes through the skin    Protocols used: POST-OP INCISION SYMPTOMS-ADULT-AH

## 2018-02-12 ENCOUNTER — TRANSFERRED RECORDS (OUTPATIENT)
Dept: HEALTH INFORMATION MANAGEMENT | Facility: CLINIC | Age: 28
End: 2018-02-12

## 2018-02-12 ASSESSMENT — MIFFLIN-ST. JEOR: SCORE: 1751.01

## 2018-02-13 ENCOUNTER — HOSPITAL ENCOUNTER (OUTPATIENT)
Facility: CLINIC | Age: 28
Setting detail: OBSERVATION
LOS: 1 days | Discharge: HOME | End: 2018-02-13
Attending: INTERNAL MEDICINE | Admitting: INTERNAL MEDICINE
Payer: MEDICARE

## 2018-02-13 ENCOUNTER — SURGERY - HEALTHEAST (OUTPATIENT)
Dept: GASTROENTEROLOGY | Facility: CLINIC | Age: 28
End: 2018-02-13

## 2018-02-13 VITALS
HEIGHT: 61 IN | WEIGHT: 242.7 LBS | DIASTOLIC BLOOD PRESSURE: 68 MMHG | OXYGEN SATURATION: 93 % | BODY MASS INDEX: 45.82 KG/M2 | RESPIRATION RATE: 16 BRPM | TEMPERATURE: 98.1 F | SYSTOLIC BLOOD PRESSURE: 114 MMHG

## 2018-02-13 PROBLEM — T18.9XXA INGESTION OF FOREIGN BODY: Status: ACTIVE | Noted: 2018-02-13

## 2018-02-13 LAB
ANION GAP SERPL CALCULATED.3IONS-SCNC: 5 MMOL/L (ref 3–14)
BUN SERPL-MCNC: 10 MG/DL (ref 7–30)
CALCIUM SERPL-MCNC: 8.6 MG/DL (ref 8.5–10.1)
CHLORIDE SERPL-SCNC: 106 MMOL/L (ref 94–109)
CO2 SERPL-SCNC: 28 MMOL/L (ref 20–32)
CREAT SERPL-MCNC: 0.6 MG/DL (ref 0.52–1.04)
ERYTHROCYTE [DISTWIDTH] IN BLOOD BY AUTOMATED COUNT: 13.9 % (ref 10–15)
GFR SERPL CREATININE-BSD FRML MDRD: >90 ML/MIN/1.7M2
GLUCOSE SERPL-MCNC: 100 MG/DL (ref 70–99)
HCT VFR BLD AUTO: 34.8 % (ref 35–47)
HGB BLD-MCNC: 11.2 G/DL (ref 11.7–15.7)
MCH RBC QN AUTO: 28 PG (ref 26.5–33)
MCHC RBC AUTO-ENTMCNC: 32.2 G/DL (ref 31.5–36.5)
MCV RBC AUTO: 87 FL (ref 78–100)
PLATELET # BLD AUTO: 266 10E9/L (ref 150–450)
POTASSIUM SERPL-SCNC: 3.9 MMOL/L (ref 3.4–5.3)
RBC # BLD AUTO: 4 10E12/L (ref 3.8–5.2)
SODIUM SERPL-SCNC: 139 MMOL/L (ref 133–144)
WBC # BLD AUTO: 8.9 10E9/L (ref 4–11)

## 2018-02-13 PROCEDURE — G0378 HOSPITAL OBSERVATION PER HR: HCPCS

## 2018-02-13 PROCEDURE — 99207 ZZC CDG-HISTORY COMP: MEETS EXP. PROBLEM FOCUSED-DOWN CODED LACK OF ROS: CPT | Performed by: INTERNAL MEDICINE

## 2018-02-13 PROCEDURE — A9270 NON-COVERED ITEM OR SERVICE: HCPCS | Mod: GY | Performed by: INTERNAL MEDICINE

## 2018-02-13 PROCEDURE — 99218 ZZC INITIAL OBSERVATION CARE,LEVL I: CPT | Performed by: INTERNAL MEDICINE

## 2018-02-13 PROCEDURE — 80048 BASIC METABOLIC PNL TOTAL CA: CPT | Performed by: INTERNAL MEDICINE

## 2018-02-13 PROCEDURE — 85027 COMPLETE CBC AUTOMATED: CPT | Performed by: INTERNAL MEDICINE

## 2018-02-13 PROCEDURE — 25000128 H RX IP 250 OP 636: Performed by: INTERNAL MEDICINE

## 2018-02-13 PROCEDURE — 36415 COLL VENOUS BLD VENIPUNCTURE: CPT | Performed by: INTERNAL MEDICINE

## 2018-02-13 PROCEDURE — 25000132 ZZH RX MED GY IP 250 OP 250 PS 637: Mod: GY | Performed by: INTERNAL MEDICINE

## 2018-02-13 PROCEDURE — 99213 OFFICE O/P EST LOW 20 MIN: CPT | Performed by: PSYCHIATRY & NEUROLOGY

## 2018-02-13 RX ORDER — ONDANSETRON 2 MG/ML
4 INJECTION INTRAMUSCULAR; INTRAVENOUS EVERY 6 HOURS PRN
Status: DISCONTINUED | OUTPATIENT
Start: 2018-02-13 | End: 2018-02-13 | Stop reason: HOSPADM

## 2018-02-13 RX ORDER — SUCRALFATE ORAL 1 G/10ML
1 SUSPENSION ORAL
Status: DISCONTINUED | OUTPATIENT
Start: 2018-02-13 | End: 2018-02-13 | Stop reason: HOSPADM

## 2018-02-13 RX ORDER — ACETAMINOPHEN 325 MG/1
650 TABLET ORAL EVERY 4 HOURS PRN
Status: DISCONTINUED | OUTPATIENT
Start: 2018-02-13 | End: 2018-02-13 | Stop reason: HOSPADM

## 2018-02-13 RX ORDER — ARIPIPRAZOLE 10 MG/1
10 TABLET ORAL EVERY MORNING
Status: DISCONTINUED | OUTPATIENT
Start: 2018-02-13 | End: 2018-02-13

## 2018-02-13 RX ORDER — ARIPIPRAZOLE 15 MG/1
15 TABLET ORAL EVERY MORNING
Status: ON HOLD | COMMUNITY
End: 2018-02-13

## 2018-02-13 RX ORDER — NALOXONE HYDROCHLORIDE 0.4 MG/ML
.1-.4 INJECTION, SOLUTION INTRAMUSCULAR; INTRAVENOUS; SUBCUTANEOUS
Status: DISCONTINUED | OUTPATIENT
Start: 2018-02-13 | End: 2018-02-13 | Stop reason: HOSPADM

## 2018-02-13 RX ORDER — SODIUM CHLORIDE 9 MG/ML
INJECTION, SOLUTION INTRAVENOUS CONTINUOUS
Status: DISCONTINUED | OUTPATIENT
Start: 2018-02-13 | End: 2018-02-13

## 2018-02-13 RX ORDER — ONDANSETRON 4 MG/1
4 TABLET, ORALLY DISINTEGRATING ORAL EVERY 6 HOURS PRN
Status: DISCONTINUED | OUTPATIENT
Start: 2018-02-13 | End: 2018-02-13 | Stop reason: HOSPADM

## 2018-02-13 RX ORDER — DESVENLAFAXINE 50 MG/1
100 TABLET, FILM COATED, EXTENDED RELEASE ORAL EVERY MORNING
Status: DISCONTINUED | OUTPATIENT
Start: 2018-02-13 | End: 2018-02-13 | Stop reason: HOSPADM

## 2018-02-13 RX ORDER — PANTOPRAZOLE SODIUM 40 MG/1
40 TABLET, DELAYED RELEASE ORAL EVERY MORNING
Status: DISCONTINUED | OUTPATIENT
Start: 2018-02-13 | End: 2018-02-13 | Stop reason: HOSPADM

## 2018-02-13 RX ORDER — LAMOTRIGINE 150 MG/1
150 TABLET ORAL EVERY MORNING
Status: DISCONTINUED | OUTPATIENT
Start: 2018-02-13 | End: 2018-02-13 | Stop reason: HOSPADM

## 2018-02-13 RX ADMIN — SUCRALFATE 1 G: 1 SUSPENSION ORAL at 11:02

## 2018-02-13 RX ADMIN — PANTOPRAZOLE SODIUM 40 MG: 40 TABLET, DELAYED RELEASE ORAL at 08:59

## 2018-02-13 RX ADMIN — CHOLECALCIFEROL CAP 125 MCG (5000 UNIT) 5000 UNITS: 125 CAP at 08:59

## 2018-02-13 RX ADMIN — SUCRALFATE 1 G: 1 SUSPENSION ORAL at 06:55

## 2018-02-13 RX ADMIN — DESVENLAFAXINE SUCCINATE 100 MG: 50 TABLET, EXTENDED RELEASE ORAL at 08:59

## 2018-02-13 RX ADMIN — ACETAMINOPHEN 650 MG: 325 TABLET, FILM COATED ORAL at 11:06

## 2018-02-13 RX ADMIN — ARIPIPRAZOLE 10 MG: 10 TABLET ORAL at 08:59

## 2018-02-13 RX ADMIN — SODIUM CHLORIDE: 9 INJECTION, SOLUTION INTRAVENOUS at 06:55

## 2018-02-13 RX ADMIN — LAMOTRIGINE 150 MG: 150 TABLET ORAL at 08:59

## 2018-02-13 NOTE — CONSULTS
Pt seen for initial psychiatric evaluation, please see my dictation for details and recommendations. Patient denies depression or suicidal intent. She may be discharged from a psychiatric perspective to follow-up with her outpatient providers tomorrow. Discontinue 72-hour hold. However, I recommend she stops taking Abilify as this possibly could have made her more impulsive.

## 2018-02-13 NOTE — PLAN OF CARE
Problem: Patient Care Overview  Goal: Plan of Care/Patient Progress Review  Outcome: Improving  Pt A/O x4, anxious, occasional tearful, cooperative. VSS. Repeats questions; needs boundaries and consistent responses.  Pt has sit for dx: self-injurious behavior (reason for admission)72 hour hold rights/consent reviewed and signed in chart.   RN removed 44 staples from mid-line ABD incision per MD order. Tylenol x1 for pain. Regular diet, tolerating well, Up SBA to BR. L PIC infiltrated-not replaced-fluids DC'd. Plan: DC home and f/u w/ outside MH provider

## 2018-02-13 NOTE — IP AVS SNAPSHOT
MRN:[id number]                      After Visit Summary   2/13/2018    Nevin Alvarado    MRN: [id number]           Thank you!     Thank you for choosing [facility] for your care. Our goal is always to provide you with excellent care. Hearing back from our patients is one way we can continue to improve our services. Please take a few minutes to complete the written survey that you may receive in the mail after you visit with us. Thank you!        Patient Information     Date Of Birth          1990        About your hospital stay     You were admitted on:  February 13, 2018 You last received care in the:  [place] [facility] [address] [facility]    You were discharged on:  February 13, 2018        Reason for your hospital stay       You were admitted with FB ingestion                  Who to Call     For medical emergencies, please call 911.  For non-urgent questions about your medical care, please call your primary care provider or clinic, [phone]          Attending Provider     Provider Specialty    Martinez, Ivan Brice MD Internal Medicine    Bird Thompson MD Internal Medicine       Primary Care Provider Office Phone # Fax #    Latonya Knight [phone]      After Care Instructions     Activity       Your activity upon discharge: activity as tolerated            Diet       Follow this diet upon discharge: Orders Placed This Encounter      Regular Diet Adult                  Follow-up Appointments     Follow-up and recommended labs and tests        Follow up with primary psychiatry                  Pending Results     No orders found from 2/11/2018 to 2/14/2018.            Statement of Approval     Ordered          02/13/18 1226  I have reviewed and agree with all the recommendations and orders detailed in this document.  EFFECTIVE NOW     Approved and electronically signed by:  Bird Thompson MD             Admission Information     Date & Time Provider  "Department Dept. Phone    2/13/2018 Bird Thompson MD [place] [address] [facility] [phone]      Your Vitals Were     Blood Pressure Temperature Respirations Height Weight Pulse Oximetry    114/68 (BP Location: Left arm) 98.1  F (36.7  C) (Oral) 16 1.549 m (5' 1\") 110.1 kg (242 lb 11.2 oz) 93%    BMI (Body Mass Index)                   45.86 kg/m2           [employer] Information     [employer] gives you secure access to your electronic health record. If you see a primary care provider, you can also send messages to your care team and make appointments. If you have questions, please call your primary care clinic.  If you do not have a primary care provider, please call [phone] and they will assist you.        Care EveryWhere ID     This is your Care EveryWhere ID. This could be used by other organizations to access your [facility] medical records  [id number]        Equal Access to Services     ZENON DIAMOND AH: Gabriel andino Sogwendolyn, warangelda luget, qaybta kaalmagregory tenorio, mic persaud. So [facility] [phone].    ATENCIÓN: Si habla español, tiene a singh disposición servicios gratuitos de asistencia lingüística. Llame al [phone].    We comply with applicable federal civil rights laws and [place] laws. We do not discriminate on the basis of race, color, national origin, age, disability, sex, sexual orientation, or gender identity.               Review of your medicines      CONTINUE these medicines which have NOT CHANGED        Dose / Directions    LAMICTAL 100 MG tablet   Generic drug:  lamoTRIgine        Dose:  150 mg   Take 150 mg by mouth every morning   Refills:  0       levonorgestrel 20 MCG/24HR IUD   Commonly known as:  MIRENA        Dose:  1 each   1 each (20 mcg) by Intrauterine route once for 1 dose   Refills:  0       PRISTIQ PO        Dose:  100 mg   Take 100 mg by mouth every morning   Refills:  0       VITAMIN D3 PO        Dose:  5000 Units   Take 5,000 " Units by mouth every morning   Refills:  0         STOP taking     ABILIFY 15 MG tablet   Generic drug:  ARIPiprazole                    Protect others around you: Learn how to safely use, store and throw away your medicines at www.disposemymeds.org.             Medication List: This is a list of all your medications and when to take them. Check marks below indicate your daily home schedule. Keep this list as a reference.      Medications           Morning Afternoon Evening Bedtime As Needed    LAMICTAL 100 MG tablet   Take 150 mg by mouth every morning   Last time this was given:  150 mg on 2/13/2018  8:59 AM   Generic drug:  lamoTRIgine                                levonorgestrel 20 MCG/24HR IUD   Commonly known as:  MIRENA   1 each (20 mcg) by Intrauterine route once for 1 dose                                PRISTIQ PO   Take 100 mg by mouth every morning   Last time this was given:  100 mg on 2/13/2018  8:59 AM                                VITAMIN D3 PO   Take 5,000 Units by mouth every morning   Last time this was given:  5,000 Units on 2/13/2018  8:59 AM

## 2018-02-13 NOTE — PROGRESS NOTES
RN removed 33 surgical staples from midline abdominal incision. Site is well approximated, no s/s of infection, no bleeding. Covered w/ ABD pad. Pt tolerated well.

## 2018-02-13 NOTE — PHARMACY-ADMISSION MEDICATION HISTORY
Admission medication history interview status for the 2/13/2018  admission is complete. See EPIC admission navigator for prior to admission medications     Medication history source reliability:Good    Actions taken by pharmacist (provider contacted, etc): Interviewed patient.      Additional medication history information not noted on PTA med list :None    Medication reconciliation/reorder completed by provider prior to medication history? Yes    Time spent in this activity: 10 minutes    Prior to Admission medications    Medication Sig Last Dose Taking? Auth Provider   ARIPiprazole (ABILIFY) 10 MG tablet Take 15 mg by mouth every morning  2/12/2018 at AM Yes Unknown, Entered By History   lamoTRIgine (LAMICTAL) 100 MG tablet Take 150 mg by mouth every morning  2/12/2018 at AM Yes Kodi Cage MD   levonorgestrel (MIRENA) 20 MCG/24HR IUD 1 each (20 mcg) by Intrauterine route once for 1 dose 2/12/2018 at Unknown time Yes Sandra Ramos MD   Desvenlafaxine Succinate (PRISTIQ PO) Take 100 mg by mouth every morning  2/12/2018 at AM Yes Reported, Patient   Cholecalciferol (VITAMIN D3 PO) Take 5,000 Units by mouth every morning  2/12/2018 at AM Yes Reported, Patient       Diana Girard, AdolfoD, BCPS

## 2018-02-13 NOTE — IP AVS SNAPSHOT
[address]    [place] [address]    Phone:  [phone]                                       After Visit Summary   2/13/2018    Nevin Alvarado    MRN: [id number]           After Visit Summary Signature Page     I have received my discharge instructions, and my questions have been answered. I have discussed any challenges I see with this plan with the nurse or doctor.    ..........................................................................................................................................  Patient/Patient Representative Signature      ..........................................................................................................................................  Patient Representative Print Name and Relationship to Patient    ..................................................               ................................................  Date                                            Time    ..........................................................................................................................................  Reviewed by Signature/Title    ...................................................              ..............................................  Date                                                            Time

## 2018-02-13 NOTE — UTILIZATION REVIEW
"Admission Status; Secondary Review Determination     Admission Date: 2/13/2018  4:43 AM      Under the authority of the Utilization Management Committee, the utilization review process indicated a secondary review on the above patient.  The review outcome is based on review of the medical records, discussions with staff, and applying clinical experience noted on the date of the review.          (x) Observation Status Appropriate - This patient does not meet hospital inpatient criteria and is placed in observation status. If this patient's primary payer is Medicare and was admitted as an inpatient, Condition Code 44 should be used and patient status changed to \"observation\".     RATIONALE FOR DETERMINATION     27-year-old with a complicated psychiatric history and history of multiple foreign body ingestions admitted for further psychiatric care after ingesting foreign body. At outside hospital, the patient underwent EGD on 2/11 after imaging studies revealed a 7.3 cm foreign body in the stomach.  The EGD revealed an ink replacement tube that was subsequently removed. There were no beds available so transferred to [facility].  On 72 hour hold and will likely need inpatient psych, awaiting psych consult.  Mild abdominal pain but on PPI and carafate and tolerating diet.        The severity of illness, intensity of service provided, expected LOS and risk for adverse outcome make the care appropriate for further observation; however, doesn't meet criteria for hospital inpatient admission. This was discussed with attending physician who concurred with this determination.        The information on this document is developed by the utilization review team in order for the business office to ensure compliance.  This only denotes the appropriateness of proper admission status and does not reflect the quality of care rendered.         The definitions of Inpatient Status and Observation Status used in making the determination " above are those provided in the CMS Coverage Manual, Chapter 1 and Chapter 6, section 70.4.      Sincerely,     Anjali Flaherty MD  Physician Advisor  [facility]

## 2018-02-13 NOTE — DISCHARGE SUMMARY
Patient discharged at 3:45 to home.  IV was discontinued. Pain at time of discharge was 0. Belongings returned to patient.  Discharge instructions and medications reviewed with patient.  Patient verbalized understanding and all questions were answered.  Prescriptions given to patient.  At time of discharge, patient condition was stable and left the unit escorted by nursing assistant with mother in private vehicle.

## 2018-02-13 NOTE — PROVIDER NOTIFICATION
MD Notification    Notified Person:  MD    Notified Persons Name: Dr. Thompson    Notification Date/Time: 02/13/18 7:07 AM    Notification Interaction:  Talked with Physician    Purpose of Notification:  No paperwork for 72 hour hold, Pt stated this was never discussed with her    Orders Received:    Comments:

## 2018-02-13 NOTE — H&P
Admitted:     02/13/2018      PRIMARY CARE:  Latonya Knight MD      PSYCHIATRIST:  Loni Maynard NP      CHIEF COMPLAINT:     1.  Ingestion of a mickie pin status post removal and subsequent transfer for psychiatric reasons due to lack of beds from [facility].   2.  Mild abdominal discomfort and irritation after foreign body ingestion removal.      HISTORY:  Nevin Alvarado is a 27-year-old, morbidly obese  female with a very complicated psychiatric history which includes anxiety, ADD, PTSD, who also has a psychiatric history of multiple foreign body ingestions or insertions including foreign bodies in the rectum, vaginally, chronic abdominal pain.        The patient since 04/2016 has had multiple admissions for swallowing foreign bodies and insertion of foreign bodies in the rectum.  These include items such as metal staples, mickie pins, paper clips, springs, staples, plastic objects, and screws.  She has place numerous objects in the rectum including glassware always stating this is during intercourse with male partners, but suspicion is that this is all self-injurious behavior.  Since 04/2016 she has had at least 11 EGDs, according to the emergency care plan in many different hospitals systems, 2 EUAs, and flexible sigmoidoscopies as well as 3 exploratory laparotomies since 12/2016, and 4 intentional and unintentional overdoses since 10/2016.      More recently, the patient in the middle of January required abdominal exploration and removal of foreign body.  She still has abdominal binder and she is recovering from this.  On 02/04 the patient had ingested another foreign body and she was seen at [facility] on 02/11 with abdominal pain.  The patient underwent EGD after imaging studies revealed a 7.3 cm foreign body in the stomach.  The EGD revealed an ink replacement tube that was subsequently removed.      The patient this evening swallowed a micike pin that was open and she took the bus to  the emergency department at [facility].  The patient underwent EGD, per verbal report from the patient and the object was extracted, and due to lack of beds the patient was transferred to [facility] for further evaluation.      The patient is now on the medical floor.  She is slightly anxious.  She complains of some mild abdominal irritation, but is hungry and she wants to eat.  She has no other complaints.      PAST MEDICAL HISTORY:   1.  ADD.   2.  Anorexia nervosa with bulimia.   3.  Anxiety.   4.  Borderline personality disorder.   5.  Depression.     6.  History of self-harm.     7.  History of multiple episodes of swallowing foreign body and insertion of foreign bodies in rectum and in her vagina.   8.  History of migraines without aura.   9.  Morbid obesity.   10.  PTSD.    11.  History of recurrent rectal foreign body placements.      PAST SURGICAL HISTORY:     1.  History of multiple EGDs including 4 in 2017.   2.  History of multiple examinations under anesthesia.     3.  History of fecal impaction and removal of foreign bodies.   4.  History of knee surgery.   5.  Laparoscopic ablation of endometriosis.   6.  Exploratory laparotomy.   7.  Mammoplasty reduction.   8.  Carpal tunnel release.      SOCIAL HISTORY:  No tobacco, no alcohol.  She is on disability.      FAMILY HISTORY:  Reviewed and noncontributory.      ALLERGIES:  PENICILLIN, AUGMENTIN, HYDROCODONE, INFLUENZA VACCINE, OSELTAMIVIR, VICODIN, CEFAZOLIN, CEPHALOSPORINS.      CURRENT MEDICATIONS:   1.  Abilify 10 mg once a day.   2.  Vitamin D 5000 units once a day.   3.  Pristiq 100 mg once a daily.   4.  Lamictal 150 mg, take 1-1/2 tablets daily.   5.  IUD.      REVIEW OF SYSTEMS:  Ten-point review of systems reviewed and as dictated in history of present illness.      PHYSICAL EXAMINATION:   VITAL SIGNS:  Temperature 98.1, heart rate 95, respirations 16, blood pressure 114/68, SATS 93% on room air.   GENERAL:  The patient is a  heavyset  female.  She is slightly anxious but is oriented x 3.   HEENT:  Unremarkable.  Pupils equal.  Sclerae nonicteric.  Mucous membranes are moist.  No oropharyngeal lesions.   LUNGS:  Clear to auscultation.   CARDIOVASCULAR:  S1, S2, regular rate and rhythm.   ABDOMEN:  Soft.  She has an abdominal binder.  Her incision site is healing well.  There is no guarding or rebound.  Normoactive bowel sounds.   EXTREMITIES:  No edema.   NEUROLOGIC:  The patient was nonfocal, cranial nerves are grossly intact.      LABORATORY STUDIES:  As dictated in history of present illness.      ASSESSMENT:  Abad Tong is a 27-year-old with a complicated psychiatric history and history of multiple foreign body ingestions admitted for further psychiatric care.      PLAN:   1.  Foreign body ingestion with mickie pin.  This has been removed.  She likely has some GI irritation.  The patient will be on a PPI and we will put her on some Carafate.  We will start her with a clear liquid diet and advance as she is able to tolerate it.   2.  Depression, borderline personality disorder, PTSD, anorexia nervosa, ADD.  Patient has numerous psychiatric diagnoses.  She sees Dr. Shira Maynard.  The patient states that since her most recent medication adjustments 2 weeks ago things have gotten worse and she seems to be escalating her self-injurious behavior.  We will obtain a psychiatric consultation.  She will be on a 72-hour hold.  The patient will be monitored, either by person or video monitoring.  We will continue the patient on her psychiatric medications.   3.  Deep venous thrombosis prophylaxis.  The patient will receive compression.        CODE STATUS:  Full.      Inpatient admission.         YUSRA LEWIS MD             D: 2018   T: 2018   MT: ARVIND      Name:     ABAD TONG   MRN:      [id number]        Account:      [id number]   :      1990        Admitted:     2018                    Document: [id number]       cc: LOUISA Maynard NP

## 2018-02-13 NOTE — PLAN OF CARE
Problem: Patient Care Overview  Goal: Plan of Care/Patient Progress Review  Pt arrived on unit around 0445.  VSS.  Heat packs given for throat/upper abdominal pain. VPM in place.  72 hour hold initiated at 0623.  Plan for psych consult today.  Continue to monitor.

## 2018-02-14 NOTE — CONSULTS
"Consult Date:  02/13/2018      REASON FOR CONSULTATION:  Ingestion.      REQUESTING PHYSICIAN:  Bird Thompson MD      PRIMARY CARE PHYSICIAN:  Latonya Knight MD      OUTPATIENT PSYCHIATRIST:  Loni Maynard NP      IDENTIFYING DATA AND REASON FOR REFERRAL:  Nevin Alvarado is a 27-year-old lady who describes herself as single and resides independently.  She was brought to [facility] for admission on account of lack of beds at [facility].  This was following an ingestion of mickie pin, status post removal.  Psychiatry was consulted to render an opinion on her ingestion.  Information was gathered through direct patient contact as well as Care Everywhere.      CHIEF COMPLAINT:  \"My Abilify was recently increased and I think it is making me more impulsive.\"      HISTORY OF PRESENT ILLNESS:  Nevin Alvarado reports that she sees her therapist, Iris Norman, on a weekly basis at [facility].  She also sees her psychiatrist quite regularly and was last seen about 2 weeks ago when her Abilify dose was raised from 10 mg to 15 mg.  Since the increase in dose of her Abilify, the patient claims that she wakes up feeling depressed or down or experiences rapid changes in her mood.  She states that her urge to ingest different items has since increased.  She does have a history of multiple psychiatric hospitalizations on account of depression and self-injurious behaviors and primarily has a diagnosis of borderline personality disorder.  She has ingested multiple objects requiring surgical radiographic interventions to remove them.  She has had multiple ED visits and has an emergency care plan that I provided her.  During this episode of care, she reportedly had intentionally ingested a mickie pin, on account of which she had it removed by EGD.  This year alone, she has had multiple procedures.  In the middle of January, she required an abdominal exploration and removal of foreign body " and still had an abdominal binder on account of that at time of admission.  On the 4th of this month, she ingested another foreign body and was seen at [facility] on the 11th of this month with abdominal pain.  She underwent EGD after imaging studies revealed a 7.3 cm foreign body in her stomach, and upon extraction it was found to be an NG tube placement tube.  On direct interview, the patient denies neurovegetative symptoms of depression.  She states she is not suicidal.  She does not endorse any intention of ending her life.  She states she does not work and is currently on disability.  She lives alone in an apartment in [place].  She states she went through dialectical behavioral therapy in the past and is currently on course to begin another course of DBT at [facility] in [place] in about 1-2 weeks.  She has a mental health  named Bceca Neves, and also has an ILS worker that sees her several times a week.  She states she works with her ILS worker weekly to organize her place and decorate it.  She states she and her therapist are working on her self-injurious behaviors, but most recently she got upset with her therapist because she did not want to talk about her self-injury.  She claims talking about it makes her want to act out even more.  The patient denies history consistent with kenzie or psychosis.  She has had presentations in the past suggestive of somatization disorder.  Her most recent psychiatric hospitalization was at [facility] in November of last year, but she has had multiple psychiatric hospitalizations.      PAST PSYCHIATRIC HISTORY:  As described in the history of present illness.  She also has history of posttraumatic stress disorder, ADHD and depressive disorder, unspecified.      CHEMICAL USE HISTORY:  The patient denies use of alcohol or illicit chemicals.      PAST MEDICAL HISTORY:   1.  History of self-harm.   2.  Morbid obesity.   3.  History of migraines  without aura.   4.  History of recurrent rectal foreign body placements and swallowing of foreign body/insertion of foreign body in the rectum and vagina.      PAST SURGICAL HISTORY:   1.  History of multiple EGDs, including 4 in 2017.   2.  History of multiple examinations under anesthesia.   3.  History of fecal impaction and removal of foreign bodies.   4.  History of knee surgery.   5.  Laparoscopic ablation of endometriosis.   6.  Exploratory laparotomy.   7.  Mammoplasty.   8.  Carpal tunnel release.     ALLERGIES:  PENICILLIN, AUGMENTIN, HYDROCODONE, INFLUENZA VACCINE, OSELTAMIVIR, VICODIN, CEFAZOLIN, CEPHALOSPORINS.      MEDICATIONS PRIOR TO ADMISSION:    1. Abilify 15 mg p.o. Daily.  2. Vitamin D 5000 units p.o. Daily.  3. Pristiq 100 mg p.o. Daily.  4. Lamictal 150 mg daily.  5. IUD.       FAMILY PSYCHIATRIC HISTORY:  The patient reports that her mother suffers from depression and anxiety and has a sister who suffers from depression.      SOCIAL HISTORY:  The patient reports she grew up in [place] [facility].  She states she has 3 sisters and 1 brother.  She dropped out of her senior year of high school because she wanted to work and moved out of her parents' home at the age of 19.  She is yet to obtain her GED.  She is supported on disability benefits.  She resides independently.  She denies  or criminal history.  She is connected with mental health resources.      REVIEW OF SYSTEMS:  I refer the reader to the 10-point review of systems documented by Bird Thompson MD on 02/13/2018 at 6:34 a.m.      VITAL SIGNS:  Blood pressure 114/68, pulse 95, respirations 16, temperature 98.1, weight 242 pounds.      MENTAL STATUS EXAMINATION:  This is a bespectacled, obese young lady who appears her stated age of 27.  She is dressed in hospital garb and is seen at her bedside where she is raping up her lunch.  She makes good eye contact and speaks clearly and coherently.  Her mood is bright and her  affect is full range.  Her thought process is logical, relevant and goal directed.  She does not endorse any self-harm thoughts, plans, or intent.  She denies the presence of auditory or visual hallucinations and there are no delusions elicited.  Her gait and station are within normal limits.  Her muscle strength is adequate.  Her language is appropriate.  She is future oriented.  Her recall of recent and remote events is good.  Her attention span and concentration are good.  She displays fair insight and poor judgment.  Her impulse control is poor.  Risk assessment at this time is considered high on account of her repeated and impulsive ingestions/insertions.      DIAGNOSTIC IMPRESSION:  Nevin Alvarado is a 27-year-old lady with an established history of borderline personality disorder who has a significant history of foreign body ingestion and insertion into different body parts, requiring different procedures for retrieval.  Most recently she ingested a mickie pin, which was removed by EGD prior to her transfer to [facility] for stabilization.  She currently denies experiencing any self-harm thoughts, plans or intent and does not report neurovegetative symptoms of depression.  She has an appointment scheduled for tomorrow and the rest of this week with her outpatient providers and is requesting discharge in order to keep those appointments.        ADMITTING DIAGNOSES:   1.  Borderline personality disorder.   2.  Posttraumatic stress disorder, chronic.   3.  Attention-deficit hyperactivity disorder by history.   4.  Eating disorder, unspecified.   5.  Obesity.   6.  Status post foreign body ingestion.      PLAN:  The patient's case was discussed with her attending physician.  She tells me that she has an appointment with her ILS worker tomorrow and an appointment with Loni Maynard NP in a couple days.  She sees her therapist on Friday and currently denies neurovegetative symptoms of  depression.  She is rather bright and displays an appropriate affect.  She recalls specifically that she met this writer in  and discussed the circumstances of the meeting, which was accurate.  She states she can guarantee her safety at home until she sees her providers.  It is possible that her impulsivity may be a consequence of the recent increase in dose of Abilify as Abilify has been known to increase impulsive behaviors in a small proportion of individuals on the medication.  I have, therefore, recommended that Abilify be discontinued while she continues her other medications and follows up with her outpatient care providers.  I will discontinue her 72-hour hold and she may be discharged from a psychiatric perspective once she is deemed medically stable.         INESSA MANUEL MD             D: 2018   T: 2018   MT: RAJI      Name:     ABAD TONG   MRN:      [id number]        Account:       [id number]   :      1990           Consult Date:  2018      Document: [id number]       cc: Bird Thompson MD

## 2018-02-15 ENCOUNTER — HOSPITAL ENCOUNTER (EMERGENCY)
Facility: CLINIC | Age: 28
Discharge: HOME | End: 2018-02-15
Attending: EMERGENCY MEDICINE | Admitting: EMERGENCY MEDICINE
Payer: MEDICARE

## 2018-02-15 VITALS
OXYGEN SATURATION: 96 % | WEIGHT: 247 LBS | TEMPERATURE: 98.8 F | BODY MASS INDEX: 45.45 KG/M2 | HEIGHT: 62 IN | RESPIRATION RATE: 16 BRPM | DIASTOLIC BLOOD PRESSURE: 86 MMHG | HEART RATE: 108 BPM | SYSTOLIC BLOOD PRESSURE: 132 MMHG

## 2018-02-15 DIAGNOSIS — R10.9 CHRONIC ABDOMINAL PAIN: ICD-10-CM

## 2018-02-15 DIAGNOSIS — G89.29 CHRONIC ABDOMINAL PAIN: ICD-10-CM

## 2018-02-15 DIAGNOSIS — Z98.890 S/P EXPLORATORY LAPAROTOMY: ICD-10-CM

## 2018-02-15 LAB
ALBUMIN SERPL-MCNC: 3.3 G/DL (ref 3.4–5)
ALBUMIN UR-MCNC: NEGATIVE MG/DL
ALP SERPL-CCNC: 96 U/L (ref 40–150)
ALT SERPL W P-5'-P-CCNC: 22 U/L (ref 0–50)
ANION GAP SERPL CALCULATED.3IONS-SCNC: 7 MMOL/L (ref 3–14)
APPEARANCE UR: CLEAR
AST SERPL W P-5'-P-CCNC: 13 U/L (ref 0–45)
BASOPHILS # BLD AUTO: 0 10E9/L (ref 0–0.2)
BASOPHILS NFR BLD AUTO: 0.2 %
BILIRUB DIRECT SERPL-MCNC: <0.1 MG/DL (ref 0–0.2)
BILIRUB SERPL-MCNC: 0.2 MG/DL (ref 0.2–1.3)
BILIRUB UR QL STRIP: NEGATIVE
BUN SERPL-MCNC: 12 MG/DL (ref 7–30)
CALCIUM SERPL-MCNC: 8.8 MG/DL (ref 8.5–10.1)
CHLORIDE SERPL-SCNC: 106 MMOL/L (ref 94–109)
CO2 SERPL-SCNC: 27 MMOL/L (ref 20–32)
COLOR UR AUTO: YELLOW
CREAT SERPL-MCNC: 0.59 MG/DL (ref 0.52–1.04)
DIFFERENTIAL METHOD BLD: ABNORMAL
EOSINOPHIL # BLD AUTO: 0.4 10E9/L (ref 0–0.7)
EOSINOPHIL NFR BLD AUTO: 3.8 %
ERYTHROCYTE [DISTWIDTH] IN BLOOD BY AUTOMATED COUNT: 13.7 % (ref 10–15)
GFR SERPL CREATININE-BSD FRML MDRD: >90 ML/MIN/1.7M2
GLUCOSE SERPL-MCNC: 114 MG/DL (ref 70–99)
GLUCOSE UR STRIP-MCNC: NEGATIVE MG/DL
HCG UR QL: NEGATIVE
HCT VFR BLD AUTO: 33.5 % (ref 35–47)
HGB BLD-MCNC: 11.2 G/DL (ref 11.7–15.7)
HGB UR QL STRIP: ABNORMAL
IMM GRANULOCYTES # BLD: 0 10E9/L (ref 0–0.4)
IMM GRANULOCYTES NFR BLD: 0.2 %
KETONES UR STRIP-MCNC: NEGATIVE MG/DL
LEUKOCYTE ESTERASE UR QL STRIP: ABNORMAL
LIPASE SERPL-CCNC: 124 U/L (ref 73–393)
LYMPHOCYTES # BLD AUTO: 1.5 10E9/L (ref 0.8–5.3)
LYMPHOCYTES NFR BLD AUTO: 16.4 %
MCH RBC QN AUTO: 28.9 PG (ref 26.5–33)
MCHC RBC AUTO-ENTMCNC: 33.4 G/DL (ref 31.5–36.5)
MCV RBC AUTO: 86 FL (ref 78–100)
MONOCYTES # BLD AUTO: 0.5 10E9/L (ref 0–1.3)
MONOCYTES NFR BLD AUTO: 5.6 %
NEUTROPHILS # BLD AUTO: 6.8 10E9/L (ref 1.6–8.3)
NEUTROPHILS NFR BLD AUTO: 73.8 %
NITRATE UR QL: NEGATIVE
NON-SQ EPI CELLS #/AREA URNS LPF: ABNORMAL /LPF
NRBC # BLD AUTO: 0 10*3/UL
NRBC BLD AUTO-RTO: 0 /100
PH UR STRIP: 6 PH (ref 5–7)
PLATELET # BLD AUTO: 292 10E9/L (ref 150–450)
POTASSIUM SERPL-SCNC: 3.6 MMOL/L (ref 3.4–5.3)
PROT SERPL-MCNC: 7.3 G/DL (ref 6.8–8.8)
RBC # BLD AUTO: 3.88 10E12/L (ref 3.8–5.2)
RBC #/AREA URNS AUTO: ABNORMAL /HPF
SODIUM SERPL-SCNC: 140 MMOL/L (ref 133–144)
SOURCE: ABNORMAL
SP GR UR STRIP: 1.02 (ref 1–1.03)
UROBILINOGEN UR STRIP-ACNC: 0.2 EU/DL (ref 0.2–1)
WBC # BLD AUTO: 9.2 10E9/L (ref 4–11)
WBC #/AREA URNS AUTO: ABNORMAL /HPF

## 2018-02-15 PROCEDURE — A9270 NON-COVERED ITEM OR SERVICE: HCPCS | Mod: GY | Performed by: EMERGENCY MEDICINE

## 2018-02-15 PROCEDURE — 25000128 H RX IP 250 OP 636: Performed by: EMERGENCY MEDICINE

## 2018-02-15 PROCEDURE — 81001 URINALYSIS AUTO W/SCOPE: CPT | Performed by: EMERGENCY MEDICINE

## 2018-02-15 PROCEDURE — 99284 EMERGENCY DEPT VISIT MOD MDM: CPT | Mod: 25

## 2018-02-15 PROCEDURE — 25000132 ZZH RX MED GY IP 250 OP 250 PS 637: Mod: GY | Performed by: EMERGENCY MEDICINE

## 2018-02-15 PROCEDURE — 83690 ASSAY OF LIPASE: CPT | Performed by: EMERGENCY MEDICINE

## 2018-02-15 PROCEDURE — 80048 BASIC METABOLIC PNL TOTAL CA: CPT | Performed by: EMERGENCY MEDICINE

## 2018-02-15 PROCEDURE — 85025 COMPLETE CBC W/AUTO DIFF WBC: CPT | Performed by: EMERGENCY MEDICINE

## 2018-02-15 PROCEDURE — 80076 HEPATIC FUNCTION PANEL: CPT | Performed by: EMERGENCY MEDICINE

## 2018-02-15 PROCEDURE — 96374 THER/PROPH/DIAG INJ IV PUSH: CPT

## 2018-02-15 RX ORDER — KETOROLAC TROMETHAMINE 30 MG/ML
30 INJECTION, SOLUTION INTRAMUSCULAR; INTRAVENOUS ONCE
Status: COMPLETED | OUTPATIENT
Start: 2018-02-15 | End: 2018-02-15

## 2018-02-15 RX ORDER — IBUPROFEN 600 MG/1
600 TABLET, FILM COATED ORAL EVERY 6 HOURS PRN
Qty: 40 TABLET | Refills: 0 | Status: SHIPPED | OUTPATIENT
Start: 2018-02-15 | End: 2019-02-19

## 2018-02-15 RX ORDER — ACETAMINOPHEN 325 MG/1
650 TABLET ORAL ONCE
Status: COMPLETED | OUTPATIENT
Start: 2018-02-15 | End: 2018-02-15

## 2018-02-15 RX ADMIN — ACETAMINOPHEN 650 MG: 325 TABLET, FILM COATED ORAL at 21:40

## 2018-02-15 RX ADMIN — KETOROLAC TROMETHAMINE 30 MG: 30 INJECTION, SOLUTION INTRAMUSCULAR at 21:40

## 2018-02-15 ASSESSMENT — ENCOUNTER SYMPTOMS
DIARRHEA: 0
CONSTIPATION: 1
NAUSEA: 0
VOMITING: 0
FEVER: 0
ABDOMINAL PAIN: 1
DYSURIA: 0

## 2018-02-15 NOTE — ED AVS SNAPSHOT
Emergency Department    [address] [facility] [address]    Phone:  [phone]    Fax:  [phone]                                       Nevin Alvarado   MRN: [id number]    Department:   Emergency Department   Date of Visit:  2/15/2018           Patient Information     Date Of Birth          1990        Your diagnoses for this visit were:     Chronic abdominal pain     S/P exploratory laparotomy        You were seen by Yvette Jones MD.      Follow-up Information     Follow up with Your surgeon In 3 days.    Why:  as instructed        Follow up with  Emergency Department.    Specialty:  EMERGENCY MEDICINE    Why:  If symptoms worsen    Contact information:    [address] [facility] [address] [phone]        Discharge Instructions       Use Tylenol or ibuprofen for your pain.  Follow-up with your surgeon as instructed and also touch base with your primary care provider.  If you have new or concerning symptoms return to the emergency department for repeat evaluation.    Discharge References/Attachments     CHRONIC PAIN (ENGLISH)    ABDOMINAL PAIN, UNKNOWN CAUSE, (FEMALE) (ENGLISH)    SURGERY, MANAGING PAIN AFTER (ENGLISH)      24 Hour Appointment Hotline       To make an appointment at any [facility], call [phone] ([phone]). If you don't have a family doctor or clinic, we will help you find one. [facility] clinics are conveniently located to serve the needs of you and your family.             Review of your medicines      START taking        Dose / Directions Last dose taken    ibuprofen 600 MG tablet   Commonly known as:  ADVIL/MOTRIN   Dose:  600 mg   Quantity:  40 tablet        Take 1 tablet (600 mg) by mouth every 6 hours as needed for moderate pain   Refills:  0          Our records show that you are taking the medicines listed below. If these are incorrect, please call your family doctor or clinic.        Dose / Directions Last  dose taken    LAMICTAL 100 MG tablet   Dose:  150 mg   Generic drug:  lamoTRIgine        Take 150 mg by mouth every morning   Refills:  0        levonorgestrel 20 MCG/24HR IUD   Commonly known as:  MIRENA   Dose:  1 each        1 each (20 mcg) by Intrauterine route once for 1 dose   Refills:  0        PRISTIQ PO   Dose:  100 mg        Take 100 mg by mouth every morning   Refills:  0        VITAMIN D3 PO   Dose:  5000 Units        Take 5,000 Units by mouth every morning   Refills:  0                Prescriptions were sent or printed at these locations (1 Prescription)                   Other Prescriptions                Printed at Department/Unit printer (1 of 1)         ibuprofen (ADVIL/MOTRIN) 600 MG tablet                Procedures and tests performed during your visit     *UA reflex to Microscopic    Basic metabolic panel    CBC with platelets differential    HCG qualitative urine (UPT)    Hepatic panel    Lipase    Urine Microscopic      Orders Needing Specimen Collection     None      Pending Results     No orders found from 2/13/2018 to 2/16/2018.            Pending Culture Results     No orders found from 2/13/2018 to 2/16/2018.            Pending Results Instructions     If you had any lab results that were not finalized at the time of your Discharge, you can call the ED Lab Result RN at [phone]. You will be contacted by this team for any positive Lab results or changes in treatment. The nurses are available 7 days a week from 10A to 6:30P.  You can leave a message 24 hours per day and they will return your call.        Test Results From Your Hospital Stay        2/15/2018  9:53 PM      Component Results     Component Value Ref Range & Units Status    WBC 9.2 4.0 - 11.0 10e9/L Final    RBC Count 3.88 3.8 - 5.2 10e12/L Final    Hemoglobin 11.2 (L) 11.7 - 15.7 g/dL Final    Hematocrit 33.5 (L) 35.0 - 47.0 % Final    MCV 86 78 - 100 fl Final    MCH 28.9 26.5 - 33.0 pg Final    MCHC 33.4 31.5 - 36.5 g/dL  Final    RDW 13.7 10.0 - 15.0 % Final    Platelet Count 292 150 - 450 10e9/L Final    Diff Method Automated Method  Final    % Neutrophils 73.8 % Final    % Lymphocytes 16.4 % Final    % Monocytes 5.6 % Final    % Eosinophils 3.8 % Final    % Basophils 0.2 % Final    % Immature Granulocytes 0.2 % Final    Nucleated RBCs 0 0 /100 Final    Absolute Neutrophil 6.8 1.6 - 8.3 10e9/L Final    Absolute Lymphocytes 1.5 0.8 - 5.3 10e9/L Final    Absolute Monocytes 0.5 0.0 - 1.3 10e9/L Final    Absolute Eosinophils 0.4 0.0 - 0.7 10e9/L Final    Absolute Basophils 0.0 0.0 - 0.2 10e9/L Final    Abs Immature Granulocytes 0.0 0 - 0.4 10e9/L Final    Absolute Nucleated RBC 0.0  Final         2/15/2018 10:09 PM      Component Results     Component Value Ref Range & Units Status    Sodium 140 133 - 144 mmol/L Final    Potassium 3.6 3.4 - 5.3 mmol/L Final    Chloride 106 94 - 109 mmol/L Final    Carbon Dioxide 27 20 - 32 mmol/L Final    Anion Gap 7 3 - 14 mmol/L Final    Glucose 114 (H) 70 - 99 mg/dL Final    Urea Nitrogen 12 7 - 30 mg/dL Final    Creatinine 0.59 0.52 - 1.04 mg/dL Final    GFR Estimate >90 >60 mL/min/1.7m2 Final    Non  GFR Calc    GFR Estimate If Black >90 >60 mL/min/1.7m2 Final    African American GFR Calc    Calcium 8.8 8.5 - 10.1 mg/dL Final         2/15/2018 10:09 PM      Component Results     Component Value Ref Range & Units Status    Lipase 124 73 - 393 U/L Final         2/15/2018 10:10 PM      Component Results     Component Value Ref Range & Units Status    Bilirubin Direct <0.1 0.0 - 0.2 mg/dL Final    Bilirubin Total 0.2 0.2 - 1.3 mg/dL Final    Albumin 3.3 (L) 3.4 - 5.0 g/dL Final    Protein Total 7.3 6.8 - 8.8 g/dL Final    Alkaline Phosphatase 96 40 - 150 U/L Final    ALT 22 0 - 50 U/L Final    AST 13 0 - 45 U/L Final         2/15/2018  9:53 PM      Component Results     Component Value Ref Range & Units Status    HCG Qual Urine Negative NEG^Negative Final    This test is for screening  purposes.  Results should be interpreted along with   the clinical picture.  Confirmation testing is available if warranted by   ordering [id number], HCG Quantitative Pregnancy.           2/15/2018 10:02 PM      Component Results     Component Value Ref Range & Units Status    Color Urine Yellow  Final    Appearance Urine Clear  Final    Glucose Urine Negative NEG^Negative mg/dL Final    Bilirubin Urine Negative NEG^Negative Final    Ketones Urine Negative NEG^Negative mg/dL Final    Specific Gravity Urine 1.025 1.003 - 1.035 Final    Blood Urine Trace (A) NEG^Negative Final    pH Urine 6.0 5.0 - 7.0 pH Final    Protein Albumin Urine Negative NEG^Negative mg/dL Final    Urobilinogen Urine 0.2 0.2 - 1.0 EU/dL Final    Nitrite Urine Negative NEG^Negative Final    Leukocyte Esterase Urine Trace (A) NEG^Negative Final    Source Midstream Urine  Final         2/15/2018 10:02 PM      Component Results     Component Value Ref Range & Units Status    WBC Urine O - 2 OTO2^O - 2 /HPF Final    RBC Urine O - 2 OTO2^O - 2 /HPF Final    Squamous Epithelial /LPF Urine Moderate (A) FEW^Few /LPF Final                Clinical Quality Measure: Blood Pressure Screening     Your blood pressure was checked while you were in the emergency department today. The last reading we obtained was  BP: 132/86 . Please read the guidelines below about what these numbers mean and what you should do about them.  If your systolic blood pressure (the top number) is less than 120 and your diastolic blood pressure (the bottom number) is less than 80, then your blood pressure is normal. There is nothing more that you need to do about it.  If your systolic blood pressure (the top number) is 120-139 or your diastolic blood pressure (the bottom number) is 80-89, your blood pressure may be higher than it should be. You should have your blood pressure rechecked within a year by a primary care provider.  If your systolic blood pressure (the top number) is 140 or  greater or your diastolic blood pressure (the bottom number) is 90 or greater, you may have high blood pressure. High blood pressure is treatable, but if left untreated over time it can put you at risk for heart attack, stroke, or kidney failure. You should have your blood pressure rechecked by a primary care provider within the next 4 weeks.  If your provider in the emergency department today gave you specific instructions to follow-up with your doctor or provider even sooner than that, you should follow that instruction and not wait for up to 4 weeks for your follow-up visit.        Thank you for choosing [facility]       Thank you for choosing [facility] for your care. Our goal is always to provide you with excellent care. Hearing back from our patients is one way we can continue to improve our services. Please take a few minutes to complete the written survey that you may receive in the mail after you visit with us. Thank you!        [employer] Information     [employer] gives you secure access to your electronic health record. If you see a primary care provider, you can also send messages to your care team and make appointments. If you have questions, please call your primary care clinic.  If you do not have a primary care provider, please call [phone] and they will assist you.        Care EveryWhere ID     This is your Care EveryWhere ID. This could be used by other organizations to access your [facility] medical records  [id number]        Equal Access to Services     ZENON DIAMOND : Hadii zaire Collado, waaxda luqadaha, qaybta kaalmada jona, mic persaud. So [facility] [phone].    ATENCIÓN: Si habla español, tiene a singh disposición servicios gratuitos de asistencia lingüística. Llame al [phone].    We comply with applicable federal civil rights laws and [place] laws. We do not discriminate on the basis of race, color, national origin, age, disability, sex, sexual  orientation, or gender identity.            After Visit Summary       This is your record. Keep this with you and show to your community pharmacist(s) and doctor(s) at your next visit.

## 2018-02-15 NOTE — ED AVS SNAPSHOT
Emergency Department    [address] [place] [address]    Phone:  [phone]    Fax:  [phone]                                       Nevin Alvarado   MRN: [id number]    Department:   Emergency Department   Date of Visit:  2/15/2018           After Visit Summary Signature Page     I have received my discharge instructions, and my questions have been answered. I have discussed any challenges I see with this plan with the nurse or doctor.    ..........................................................................................................................................  Patient/Patient Representative Signature      ..........................................................................................................................................  Patient Representative Print Name and Relationship to Patient    ..................................................               ................................................  Date                                            Time    ..........................................................................................................................................  Reviewed by Signature/Title    ...................................................              ..............................................  Date                                                            Time

## 2018-02-16 ENCOUNTER — CARE COORDINATION (OUTPATIENT)
Dept: CARE COORDINATION | Facility: CLINIC | Age: 28
End: 2018-02-16

## 2018-02-16 NOTE — PROGRESS NOTES
Clinic Care Coordination Contact    ED visit 2/15/2018 Chronic Abdominal Pain     See Nevin LEMUS encounter documentationv 2/1/2018.     Patient has an Harry PCP.    No Leticia follow up outreach made .  Encounter closed     Gogo Penaloza RN / Clinical Care Coordinator     [address] [address]  [email or web address]  Office :  [phone] / Fax :  [phone]

## 2018-02-16 NOTE — DISCHARGE INSTRUCTIONS
Use Tylenol or ibuprofen for your pain.  Follow-up with your surgeon as instructed and also touch base with your primary care provider.  If you have new or concerning symptoms return to the emergency department for repeat evaluation.

## 2018-02-16 NOTE — ED PROVIDER NOTES
History     Chief Complaint:  Abdominal Pain       The history is provided by the patient.      Nevin Alvarado is a 27 year old female with chronic abdominal pain and recurrent rectal foreign bodies or foreign body ingestions (she reports due to complusions) who presents with moderate, non-radiating right sided abdominal pain which has been ongoing for the past two days. She had a surgery at [facility] about one month ago to remove a foreign body (pill bottle inserted in her rectum). This week she was seen at an outside hospital for endoscopic removal of another foreign body ingestion (mickie pins) and was transferred to [facility] for observation afterwards. During this hospitalization the patient had her staples removed (she has not followed up with her surgeon) and notes today's pain started when she got her staples out. Patient has not seen a doctor for this particular episode of pain. She also endorses some slight constipation.  She reports she did not ingest any foreign objects nor did she insert any in her rectum or vagina. Patient denies fever, dysuria, nausea, vomiting, and diarrhea.     Allergies:  Penicillin  Hydrocodone  Influenza vaccines  Oseltamivir  Vicodin  Cephalosporins     Medications:    Lamictal  Mirena  Pristiq  Vitamin D     Past Medical History:    ADD  Anorexia nervosa with bulimia   Anxiety  Borderline personality disorder  Depression  History of self harm and suicidal ideation  Migraine  Obesity   PTSD  Rectal foreign body- recurrent issue  Swallow foreign body- recurrent issue  Chronic abdominal pain    Past Surgical History:    EGD x 5  Knee surgery  Remove FB with anesthesia   Knee surgery  Laparoscopic ablation endometriosis  Laparotomy exploratory   Lymph nodes removed from neck   Mammoplasty  Release carpal tunnel  Sigmoidoscopy flexible    Family History:    Cerebrovascular disease    Social History:  Marital Status: Single   Presents to the ED alone.   Tobacco Use:  "Never  Alcohol Use: No  PCP: Latonya Knight       Review of Systems   Constitutional: Negative for fever.   Gastrointestinal: Positive for abdominal pain and constipation. Negative for diarrhea, nausea and vomiting.   Genitourinary: Negative for dysuria.   All other systems reviewed and are negative.      Physical Exam   First Vitals:  Patient Vitals for the past 24 hrs:   BP Temp Temp src Pulse Resp SpO2 Height Weight   02/15/18 2023 144/73 98.8  F (37.1  C) Oral 108 16 98 % 1.575 m (5' 2\") 112 kg (247 lb)       Physical Exam  General: Well-developed and obese; well appearing young  woman; cooperative  Head:  Atraumatic  Eyes:  Conjunctivae, lids, and sclerae are normal  ENT:    Normal nose; moist mucous membranes  Neck:  Supple; normal range of motion  CV:  Regular rate and rhythm; normal heart sounds with no murmurs, rubs, or gallops detected  Resp:  No respiratory distress; clear to auscultation bilaterally without decreased breath sounds, wheezing, rales, or rhonchi  GI:  Soft; non-distended; non-tender; well healing laparotomy incision with dressing clean, dry, and intact. No cellulitis. Abdominal binder in place.   MS:  Normal ROM  Skin:  Warm; non-diaphoretic; no pallor  Neuro: Awake; A&Ox3; normal strength  Psych: Blunted mood and flat affect; normal speech  Vitals reviewed.    Emergency Department Course     Laboratory:  CBC:  WBC 9.2, HGB 11.2 (L),   BMP: Glucose 114 (H), otherwise WNL (Creatinine 0.59)  Lipase: 124  Hepatic panel: Bili Direct <0.1, Bili Total 0.2, Albumin 3.3 (L), Protein Total 7.3, Alkphos 96, ALT 22, AST 13.  UA: Clear yellow urine, trace blood, trace leukocyte esterase, otherwise WNL  UA, Microscopic: WBC 0-2, RBC 0-2, moderate squamous epithelial   HCG urine: Negative    Interventions:  2140: Toradol, 30 mg, IV injection  2140: Tylenol, 650 mg, oral    Emergency Department Course:  Nursing notes and vitals reviewed.  2124: I performed an exam of the patient as " documented above.  The above workup was undertaken.  2240: I rechecked the patient and discussed results. Patient feels improved and is amenable to discharge.   Findings and plan explained to the patient. Patient discharged home, status improved, with instructions regarding supportive care, medications, and reasons to return as well as the importance of close follow-up was reviewed. Patient was prescribed Advil.     Impression & Plan      Medical Decision Making:  Nevin is a 27-year-old female with multiple medical problems including chronic abdominal pain and repetitive ingestion of foreign bodies or placement of foreign bodies in her rectum.  For both of these problems patient has a ED care plan.  She recently had a laparotomy for retained rectal foreign body.  She was also discharged within the last 2 days after a stay for evaluation after endoscopy for foreign body.  Patient states that during that stay she had her staples removed and since her staples have been removed she is having some right upper quadrant pain.  Patient cannot describe her pain well and has no tenderness on exam.  This appears to be chronic abdominal pain or possibly malingering.  Her wound is well hearing and the remainder of her exam is unremarkable.  Patient adamantly denies ingestion of a foreign body or rectal/vaginal foreign body today.      She was given Tylenol and Toradol for her pain.  Fortunately, urinalysis is without evidence of acute infection.  Urine pregnancy test is negative.  CBC, BMP, LFTs, and lipase are unremarkable.  Imaging of the abdomen is not indicated as she has no reproducible tenderness and she has a ED care plan for her chronic abdominal pain noting a remarkable number of imaging studies in her past.  Further workup is unlikely to  and patient does not have intractable pain requiring admission to the hospital.  On repeat evaluation patient states she is improved and she thinks the Toradol most  notably improved her pain.  I recommended continued use of Motrin as an outpatient as well as follow-up with both her primary care provider and her surgeon.  I answered all the patient's questions and provided return precautions.  She verbalized understanding and is amenable to discharge.    Diagnosis:    ICD-10-CM    1. Chronic abdominal pain R10.9     G89.29    2. S/P exploratory laparotomy Z98.890        Disposition:  Discharged home.     Discharge Medications:  New Prescriptions    IBUPROFEN (ADVIL/MOTRIN) 600 MG TABLET    Take 1 tablet (600 mg) by mouth every 6 hours as needed for moderate pain         Kayla MURRAY, am serving as a scribe on 2/15/2018 at 9:24 PM to personally document services performed by Dr. Jones based on my observations and the provider's statements to me.    EMERGENCY DEPARTMENT       Yvette Jones MD  02/16/18 8828

## 2018-03-19 ENCOUNTER — HOSPITAL ENCOUNTER (INPATIENT)
Facility: CLINIC | Age: 28
LOS: 1 days | Discharge: HOME | DRG: 881 | End: 2018-03-21
Attending: EMERGENCY MEDICINE | Admitting: PSYCHIATRY & NEUROLOGY
Payer: MEDICARE

## 2018-03-19 ENCOUNTER — APPOINTMENT (OUTPATIENT)
Dept: GENERAL RADIOLOGY | Facility: CLINIC | Age: 28
DRG: 881 | End: 2018-03-19
Attending: EMERGENCY MEDICINE
Payer: MEDICARE

## 2018-03-19 ENCOUNTER — ANESTHESIA (OUTPATIENT)
Dept: SURGERY | Facility: CLINIC | Age: 28
DRG: 881 | End: 2018-03-19
Payer: MEDICARE

## 2018-03-19 ENCOUNTER — ANESTHESIA EVENT (OUTPATIENT)
Dept: SURGERY | Facility: CLINIC | Age: 28
DRG: 881 | End: 2018-03-19
Payer: MEDICARE

## 2018-03-19 DIAGNOSIS — F60.3 BORDERLINE PERSONALITY DISORDER IN ADULT (H): ICD-10-CM

## 2018-03-19 DIAGNOSIS — Z72.89 SELF-INJURIOUS BEHAVIOR: ICD-10-CM

## 2018-03-19 DIAGNOSIS — T18.9XXA SWALLOWED FOREIGN BODY, INITIAL ENCOUNTER: ICD-10-CM

## 2018-03-19 DIAGNOSIS — F33.2 SEVERE EPISODE OF RECURRENT MAJOR DEPRESSIVE DISORDER, WITHOUT PSYCHOTIC FEATURES (H): ICD-10-CM

## 2018-03-19 DIAGNOSIS — F60.3 BORDERLINE PERSONALITY DISORDER (H): Primary | ICD-10-CM

## 2018-03-19 LAB
AMPHETAMINES UR QL SCN: NEGATIVE
ANION GAP SERPL CALCULATED.3IONS-SCNC: 8 MMOL/L (ref 3–14)
BARBITURATES UR QL: NEGATIVE
BASOPHILS # BLD AUTO: 0 10E9/L (ref 0–0.2)
BASOPHILS NFR BLD AUTO: 0.2 %
BENZODIAZ UR QL: POSITIVE
BUN SERPL-MCNC: 9 MG/DL (ref 7–30)
CALCIUM SERPL-MCNC: 8.8 MG/DL (ref 8.5–10.1)
CANNABINOIDS UR QL SCN: NEGATIVE
CHLORIDE SERPL-SCNC: 107 MMOL/L (ref 94–109)
CO2 SERPL-SCNC: 26 MMOL/L (ref 20–32)
COCAINE UR QL: NEGATIVE
CREAT SERPL-MCNC: 0.5 MG/DL (ref 0.52–1.04)
DIFFERENTIAL METHOD BLD: ABNORMAL
EOSINOPHIL # BLD AUTO: 0.1 10E9/L (ref 0–0.7)
EOSINOPHIL NFR BLD AUTO: 1.3 %
ERYTHROCYTE [DISTWIDTH] IN BLOOD BY AUTOMATED COUNT: 14 % (ref 10–15)
ETHANOL UR QL SCN: NEGATIVE
GFR SERPL CREATININE-BSD FRML MDRD: >90 ML/MIN/1.7M2
GLUCOSE SERPL-MCNC: 88 MG/DL (ref 70–99)
HCG UR QL: NEGATIVE
HCT VFR BLD AUTO: 35.4 % (ref 35–47)
HGB BLD-MCNC: 11.4 G/DL (ref 11.7–15.7)
IMM GRANULOCYTES # BLD: 0 10E9/L (ref 0–0.4)
IMM GRANULOCYTES NFR BLD: 0.2 %
INR PPP: 1.03 (ref 0.86–1.14)
LYMPHOCYTES # BLD AUTO: 1.7 10E9/L (ref 0.8–5.3)
LYMPHOCYTES NFR BLD AUTO: 15.3 %
MCH RBC QN AUTO: 28.1 PG (ref 26.5–33)
MCHC RBC AUTO-ENTMCNC: 32.2 G/DL (ref 31.5–36.5)
MCV RBC AUTO: 87 FL (ref 78–100)
MONOCYTES # BLD AUTO: 0.7 10E9/L (ref 0–1.3)
MONOCYTES NFR BLD AUTO: 5.9 %
NEUTROPHILS # BLD AUTO: 8.6 10E9/L (ref 1.6–8.3)
NEUTROPHILS NFR BLD AUTO: 77.1 %
NRBC # BLD AUTO: 0 10*3/UL
NRBC BLD AUTO-RTO: 0 /100
OPIATES UR QL SCN: NEGATIVE
PLATELET # BLD AUTO: 247 10E9/L (ref 150–450)
POTASSIUM SERPL-SCNC: 4 MMOL/L (ref 3.4–5.3)
RBC # BLD AUTO: 4.06 10E12/L (ref 3.8–5.2)
SODIUM SERPL-SCNC: 141 MMOL/L (ref 133–144)
WBC # BLD AUTO: 11.1 10E9/L (ref 4–11)

## 2018-03-19 PROCEDURE — 80320 DRUG SCREEN QUANTALCOHOLS: CPT | Performed by: FAMILY MEDICINE

## 2018-03-19 PROCEDURE — 71000014 ZZH RECOVERY PHASE 1 LEVEL 2 FIRST HR: Performed by: INTERNAL MEDICINE

## 2018-03-19 PROCEDURE — 80307 DRUG TEST PRSMV CHEM ANLYZR: CPT | Performed by: FAMILY MEDICINE

## 2018-03-19 PROCEDURE — 25000128 H RX IP 250 OP 636: Performed by: STUDENT IN AN ORGANIZED HEALTH CARE EDUCATION/TRAINING PROGRAM

## 2018-03-19 PROCEDURE — 25000128 H RX IP 250 OP 636: Performed by: ANESTHESIOLOGY

## 2018-03-19 PROCEDURE — 71000015 ZZH RECOVERY PHASE 1 LEVEL 2 EA ADDTL HR: Performed by: INTERNAL MEDICINE

## 2018-03-19 PROCEDURE — 36000053 ZZH SURGERY LEVEL 2 EA 15 ADDTL MIN - UMMC: Performed by: INTERNAL MEDICINE

## 2018-03-19 PROCEDURE — 85025 COMPLETE CBC W/AUTO DIFF WBC: CPT | Performed by: EMERGENCY MEDICINE

## 2018-03-19 PROCEDURE — 25000125 ZZHC RX 250: Performed by: ANESTHESIOLOGY

## 2018-03-19 PROCEDURE — 74019 RADEX ABDOMEN 2 VIEWS: CPT

## 2018-03-19 PROCEDURE — 40000171 ZZH STATISTIC PRE-PROCEDURE ASSESSMENT III: Performed by: INTERNAL MEDICINE

## 2018-03-19 PROCEDURE — 85610 PROTHROMBIN TIME: CPT | Performed by: EMERGENCY MEDICINE

## 2018-03-19 PROCEDURE — 25000128 H RX IP 250 OP 636: Performed by: EMERGENCY MEDICINE

## 2018-03-19 PROCEDURE — 36415 COLL VENOUS BLD VENIPUNCTURE: CPT | Performed by: EMERGENCY MEDICINE

## 2018-03-19 PROCEDURE — 96361 HYDRATE IV INFUSION ADD-ON: CPT | Performed by: EMERGENCY MEDICINE

## 2018-03-19 PROCEDURE — 0DC78ZZ EXTIRPATION OF MATTER FROM STOMACH, PYLORUS, VIA NATURAL OR ARTIFICIAL OPENING ENDOSCOPIC: ICD-10-PCS | Performed by: INTERNAL MEDICINE

## 2018-03-19 PROCEDURE — 37000008 ZZH ANESTHESIA TECHNICAL FEE, 1ST 30 MIN: Performed by: INTERNAL MEDICINE

## 2018-03-19 PROCEDURE — 25000566 ZZH SEVOFLURANE, EA 15 MIN: Performed by: INTERNAL MEDICINE

## 2018-03-19 PROCEDURE — 81025 URINE PREGNANCY TEST: CPT | Performed by: FAMILY MEDICINE

## 2018-03-19 PROCEDURE — 27210794 ZZH OR GENERAL SUPPLY STERILE: Performed by: INTERNAL MEDICINE

## 2018-03-19 PROCEDURE — 25000125 ZZHC RX 250: Performed by: INTERNAL MEDICINE

## 2018-03-19 PROCEDURE — 99285 EMERGENCY DEPT VISIT HI MDM: CPT | Mod: 25 | Performed by: EMERGENCY MEDICINE

## 2018-03-19 PROCEDURE — 99285 EMERGENCY DEPT VISIT HI MDM: CPT | Mod: Z6 | Performed by: EMERGENCY MEDICINE

## 2018-03-19 PROCEDURE — 80048 BASIC METABOLIC PNL TOTAL CA: CPT | Performed by: EMERGENCY MEDICINE

## 2018-03-19 PROCEDURE — 37000009 ZZH ANESTHESIA TECHNICAL FEE, EACH ADDTL 15 MIN: Performed by: INTERNAL MEDICINE

## 2018-03-19 PROCEDURE — A9270 NON-COVERED ITEM OR SERVICE: HCPCS | Performed by: INTERNAL MEDICINE

## 2018-03-19 PROCEDURE — 36000051 ZZH SURGERY LEVEL 2 1ST 30 MIN - UMMC: Performed by: INTERNAL MEDICINE

## 2018-03-19 RX ORDER — SODIUM CHLORIDE, SODIUM LACTATE, POTASSIUM CHLORIDE, CALCIUM CHLORIDE 600; 310; 30; 20 MG/100ML; MG/100ML; MG/100ML; MG/100ML
INJECTION, SOLUTION INTRAVENOUS CONTINUOUS
Status: CANCELLED | OUTPATIENT
Start: 2018-03-19

## 2018-03-19 RX ORDER — FENTANYL CITRATE 50 UG/ML
25-50 INJECTION, SOLUTION INTRAMUSCULAR; INTRAVENOUS
Status: CANCELLED | OUTPATIENT
Start: 2018-03-19

## 2018-03-19 RX ORDER — FENTANYL CITRATE 50 UG/ML
25-50 INJECTION, SOLUTION INTRAMUSCULAR; INTRAVENOUS EVERY 5 MIN PRN
Status: DISCONTINUED | OUTPATIENT
Start: 2018-03-19 | End: 2018-03-20 | Stop reason: HOSPADM

## 2018-03-19 RX ORDER — HYDRALAZINE HYDROCHLORIDE 20 MG/ML
2.5-5 INJECTION INTRAMUSCULAR; INTRAVENOUS EVERY 10 MIN PRN
Status: DISCONTINUED | OUTPATIENT
Start: 2018-03-19 | End: 2018-03-20 | Stop reason: HOSPADM

## 2018-03-19 RX ORDER — HYDROMORPHONE HYDROCHLORIDE 1 MG/ML
.3-.5 INJECTION, SOLUTION INTRAMUSCULAR; INTRAVENOUS; SUBCUTANEOUS EVERY 5 MIN PRN
Status: CANCELLED | OUTPATIENT
Start: 2018-03-19

## 2018-03-19 RX ORDER — ONDANSETRON 2 MG/ML
4 INJECTION INTRAMUSCULAR; INTRAVENOUS EVERY 30 MIN PRN
Status: DISCONTINUED | OUTPATIENT
Start: 2018-03-19 | End: 2018-03-20 | Stop reason: HOSPADM

## 2018-03-19 RX ORDER — ONDANSETRON 4 MG/1
4 TABLET, ORALLY DISINTEGRATING ORAL EVERY 30 MIN PRN
Status: CANCELLED | OUTPATIENT
Start: 2018-03-19

## 2018-03-19 RX ORDER — NALOXONE HYDROCHLORIDE 0.4 MG/ML
.1-.4 INJECTION, SOLUTION INTRAMUSCULAR; INTRAVENOUS; SUBCUTANEOUS
Status: CANCELLED | OUTPATIENT
Start: 2018-03-19 | End: 2018-03-20

## 2018-03-19 RX ORDER — LIDOCAINE HYDROCHLORIDE 20 MG/ML
INJECTION, SOLUTION INFILTRATION; PERINEURAL PRN
Status: DISCONTINUED | OUTPATIENT
Start: 2018-03-19 | End: 2018-03-20

## 2018-03-19 RX ORDER — ONDANSETRON 4 MG/1
4 TABLET, ORALLY DISINTEGRATING ORAL EVERY 30 MIN PRN
Status: DISCONTINUED | OUTPATIENT
Start: 2018-03-19 | End: 2018-03-20 | Stop reason: HOSPADM

## 2018-03-19 RX ORDER — NALOXONE HYDROCHLORIDE 0.4 MG/ML
.1-.4 INJECTION, SOLUTION INTRAMUSCULAR; INTRAVENOUS; SUBCUTANEOUS
Status: DISCONTINUED | OUTPATIENT
Start: 2018-03-19 | End: 2018-03-20 | Stop reason: HOSPADM

## 2018-03-19 RX ORDER — SODIUM CHLORIDE, SODIUM LACTATE, POTASSIUM CHLORIDE, CALCIUM CHLORIDE 600; 310; 30; 20 MG/100ML; MG/100ML; MG/100ML; MG/100ML
INJECTION, SOLUTION INTRAVENOUS CONTINUOUS
Status: DISCONTINUED | OUTPATIENT
Start: 2018-03-19 | End: 2018-03-20 | Stop reason: HOSPADM

## 2018-03-19 RX ORDER — DEXAMETHASONE SODIUM PHOSPHATE 10 MG/ML
INJECTION, SOLUTION INTRAMUSCULAR; INTRAVENOUS PRN
Status: DISCONTINUED | OUTPATIENT
Start: 2018-03-19 | End: 2018-03-20

## 2018-03-19 RX ORDER — SODIUM CHLORIDE, SODIUM LACTATE, POTASSIUM CHLORIDE, CALCIUM CHLORIDE 600; 310; 30; 20 MG/100ML; MG/100ML; MG/100ML; MG/100ML
INJECTION, SOLUTION INTRAVENOUS CONTINUOUS PRN
Status: DISCONTINUED | OUTPATIENT
Start: 2018-03-19 | End: 2018-03-20

## 2018-03-19 RX ORDER — PROPOFOL 10 MG/ML
INJECTION, EMULSION INTRAVENOUS PRN
Status: DISCONTINUED | OUTPATIENT
Start: 2018-03-19 | End: 2018-03-20

## 2018-03-19 RX ORDER — ONDANSETRON 2 MG/ML
4 INJECTION INTRAMUSCULAR; INTRAVENOUS EVERY 30 MIN PRN
Status: CANCELLED | OUTPATIENT
Start: 2018-03-19

## 2018-03-19 RX ORDER — HYDROMORPHONE HYDROCHLORIDE 1 MG/ML
.3-.5 INJECTION, SOLUTION INTRAMUSCULAR; INTRAVENOUS; SUBCUTANEOUS EVERY 10 MIN PRN
Status: DISCONTINUED | OUTPATIENT
Start: 2018-03-19 | End: 2018-03-20 | Stop reason: HOSPADM

## 2018-03-19 RX ORDER — HYDROMORPHONE HYDROCHLORIDE 1 MG/ML
.3-.5 INJECTION, SOLUTION INTRAMUSCULAR; INTRAVENOUS; SUBCUTANEOUS
Status: DISCONTINUED | OUTPATIENT
Start: 2018-03-19 | End: 2018-03-20 | Stop reason: HOSPADM

## 2018-03-19 RX ADMIN — PHENYLEPHRINE HYDROCHLORIDE 50 MCG: 10 INJECTION, SOLUTION INTRAMUSCULAR; INTRAVENOUS; SUBCUTANEOUS at 23:56

## 2018-03-19 RX ADMIN — SODIUM CHLORIDE, POTASSIUM CHLORIDE, SODIUM LACTATE AND CALCIUM CHLORIDE: 600; 310; 30; 20 INJECTION, SOLUTION INTRAVENOUS at 23:30

## 2018-03-19 RX ADMIN — LIDOCAINE HYDROCHLORIDE 160 MG: 20 INJECTION, SOLUTION INFILTRATION; PERINEURAL at 23:36

## 2018-03-19 RX ADMIN — FENTANYL CITRATE 250 MCG: 50 INJECTION, SOLUTION INTRAMUSCULAR; INTRAVENOUS at 23:35

## 2018-03-19 RX ADMIN — SODIUM CHLORIDE 500 ML: 9 INJECTION, SOLUTION INTRAVENOUS at 20:20

## 2018-03-19 RX ADMIN — Medication 140 MG: at 23:38

## 2018-03-19 RX ADMIN — MIDAZOLAM 2 MG: 1 INJECTION INTRAMUSCULAR; INTRAVENOUS at 23:32

## 2018-03-19 RX ADMIN — DEXAMETHASONE SODIUM PHOSPHATE 4 MG: 10 INJECTION, SOLUTION INTRAMUSCULAR; INTRAVENOUS at 23:52

## 2018-03-19 RX ADMIN — PROPOFOL 200 MG: 10 INJECTION, EMULSION INTRAVENOUS at 23:38

## 2018-03-19 ASSESSMENT — ENCOUNTER SYMPTOMS
ABDOMINAL PAIN: 1
VOMITING: 0
NAUSEA: 0

## 2018-03-19 NOTE — IP AVS SNAPSHOT
MRN:[id number]                      After Visit Summary   3/19/2018    Nevin Alvarado    MRN: [id number]           Thank you!     Thank you for choosing [facility] for your care. Our goal is always to provide you with excellent care.        Patient Information     Date Of Birth          1990        About your hospital stay     You were admitted on:  March 20, 2018 You last received care in the:  [facility]    You were discharged on:  March 21, 2018       Who to Call     For medical emergencies, please call 911.  For non-urgent questions about your medical care, please call your primary care provider or clinic, [phone]  For questions related to your surgery, please call your surgery clinic        Attending Provider     Provider Specialty    Jolly Ravi MD Emergency Medicine    David, Ev Dumont MD Psychiatry       Primary Care Provider Office Phone # Fax #    Latonya Knight [phone]      Further instructions from your care team        Behavioral Discharge Planning and Instructions      Summary:  You were admitted on 3/20/2018  due to Self Injurious Behaviors as you presented to the emergency department on 3/19/18 after swallowing mickie pins.  You were treated by Dr. Ev Hernandez MD and discharged on 3/21/18 from [facility] Home      Principal Diagnosis:   Major Depressive Disorder  Borderline personality disorder      Health Care Follow-up Appointments:       Monday April 2, 2018 10:00am  with Dr. Rosio De La Rosa  [facility] - [address] [facility]. 6th [place] [address] ph: [phone] fax: [phone]      Friday April 27, 2018 11:00am  Therapist - Anca Benítez PsyD, LP  [facility] [address] [facility]. [place] [address] [place] [id number] ph: [phone] fax: [phone]      Continue with your in home supports and  "community supports such as case management as you were previously.    Attend all scheduled appointments with your outpatient providers. Call at least 24 hours in advance if you need to reschedule an appointment to ensure continued access to your outpatient providers.   Major Treatments, Procedures and Findings:  You were provided with: a psychiatric assessment and milieu management    Symptoms to Report: feeling more aggressive, increased confusion, losing more sleep, mood getting worse or thoughts of suicide    Early warning signs can include: increased depression or anxiety sleep disturbances increased thoughts or behaviors of suicide or self-harm     Safety and Wellness:  Take all medicines as directed.  Make no changes unless your doctor suggests them.      Follow treatment recommendations.  Refrain from alcohol and non-prescribed drugs.     Resources:   Crisis Intervention: 857.413.9879 or [phone] (TTY: 377.400.5727).  Call anytime for help.  [employer] Mental Illness (www.[place].darnell.org): 948.571.1011 or 283-173-9850.  [facility] Crisis Response [phone]    The treatment team has appreciated the opportunity to work with you.     If you have any questions or concerns our unit number is [phone]      Pending Results     No orders found from 3/17/2018 to 3/20/2018.            Admission Information     Date & Time Provider Department Dept. Phone    3/19/2018 Ev Hernandez MD [facility] [phone]      Your Vitals Were     Blood Pressure Pulse Temperature Respirations Height Weight    150/66 79 97.1  F (36.2  C) (Tympanic) 17 1.575 m (5' 2\") 110.7 kg (244 lb)    Pulse Oximetry BMI (Body Mass Index)                97% 44.63 kg/m2          MyChart Information     [facility] gives you secure access to your electronic health record. If you see a primary care provider, you can also send messages to your care team and make appointments. If you have questions, please call your primary care " clinic.  If you do not have a primary care provider, please call [phone] and they will assist you.        Care EveryWhere ID     This is your Care EveryWhere ID. This could be used by other organizations to access your [facility] medical records  [id number]        Equal Access to Services     ZENON DIAMOND : Gabriel Collado, warangelda lakhwinderqadaha, qaybta kaalmagregory tenorio, mic persaud. So [facility] [phone].    ATENCIÓN: Si habla español, tiene a singh disposición servicios gratuitos de asistencia lingüística. Llame al [phone].    We comply with applicable federal civil rights laws and [place] laws. We do not discriminate on the basis of race, color, national origin, age, disability, sex, sexual orientation, or gender identity.               Review of your medicines      CONTINUE these medicines which may have CHANGED, or have new prescriptions. If we are uncertain of the size of tablets/capsules you have at home, strength may be listed as something that might have changed.        Dose / Directions    lurasidone 20 MG Tabs tablet   Commonly known as:  LATUDA   This may have changed:    - medication strength  - how much to take   Used for:  Borderline personality disorder, Severe episode of recurrent major depressive disorder, without psychotic features (H)        Dose:  40 mg   Take 2 tablets (40 mg) by mouth every morning   Quantity:  12 tablet   Refills:  0         CONTINUE these medicines which have NOT CHANGED        Dose / Directions    levonorgestrel 20 MCG/24HR IUD   Commonly known as:  MIRENA        Dose:  1 each   1 each (20 mcg) by Intrauterine route once for 1 dose   Refills:  0       MELATONIN PO        Dose:  3 mg   Take 3 mg by mouth nightly as needed (sleep)   Refills:  0       PRISTIQ PO        Dose:  100 mg   Take 100 mg by mouth every morning   Refills:  0       VITAMIN D3 PO        Dose:  2000 Units   Take 2,000 Units by mouth every morning   Refills:   0            Where to get your medicines      These medications were sent to [facility] [place], [place] - [address]  [address] [facility] [address], [facility] [address]     Phone:  [phone]     lurasidone 20 MG Tabs tablet                Protect others around you: Learn how to safely use, store and throw away your medicines at www.disposemymeds.org.             Medication List: This is a list of all your medications and when to take them. Check marks below indicate your daily home schedule. Keep this list as a reference.      Medications           Morning Afternoon Evening Bedtime As Needed    levonorgestrel 20 MCG/24HR IUD   Commonly known as:  MIRENA   1 each (20 mcg) by Intrauterine route once for 1 dose   Last time this was given:  20 mcg on 3/20/2018  9:42 PM                                lurasidone 20 MG Tabs tablet   Commonly known as:  LATUDA   Take 2 tablets (40 mg) by mouth every morning   Last time this was given:  20 mg on 3/21/2018  8:40 AM                                MELATONIN PO   Take 3 mg by mouth nightly as needed (sleep)                                PRISTIQ PO   Take 100 mg by mouth every morning   Last time this was given:  100 mg on 3/21/2018  8:41 AM                                VITAMIN D3 PO   Take 2,000 Units by mouth every morning   Last time this was given:  2,000 Units on 3/21/2018  8:41 AM

## 2018-03-19 NOTE — IP AVS SNAPSHOT
[place] [id number]    [place] [address]    Phone:  [phone]                                       After Visit Summary   3/19/2018    Nevin Alvarado    MRN: [id number]           After Visit Summary Signature Page     I have received my discharge instructions, and my questions have been answered. I have discussed any challenges I see with this plan with the nurse or doctor.    ..........................................................................................................................................  Patient/Patient Representative Signature      ..........................................................................................................................................  Patient Representative Print Name and Relationship to Patient    ..................................................               ................................................  Date                                            Time    ..........................................................................................................................................  Reviewed by Signature/Title    ...................................................              ..............................................  Date                                                            Time

## 2018-03-20 ENCOUNTER — APPOINTMENT (OUTPATIENT)
Dept: GENERAL RADIOLOGY | Facility: CLINIC | Age: 28
DRG: 881 | End: 2018-03-20
Payer: MEDICARE

## 2018-03-20 PROBLEM — F32.9 MAJOR DEPRESSIVE DISORDER: Status: ACTIVE | Noted: 2018-03-20

## 2018-03-20 LAB — UPPER GI ENDOSCOPY: NORMAL

## 2018-03-20 PROCEDURE — 96375 TX/PRO/DX INJ NEW DRUG ADDON: CPT | Performed by: EMERGENCY MEDICINE

## 2018-03-20 PROCEDURE — 96376 TX/PRO/DX INJ SAME DRUG ADON: CPT | Performed by: EMERGENCY MEDICINE

## 2018-03-20 PROCEDURE — 25000125 ZZHC RX 250: Performed by: STUDENT IN AN ORGANIZED HEALTH CARE EDUCATION/TRAINING PROGRAM

## 2018-03-20 PROCEDURE — 74018 RADEX ABDOMEN 1 VIEW: CPT

## 2018-03-20 PROCEDURE — 25000128 H RX IP 250 OP 636: Performed by: ANESTHESIOLOGY

## 2018-03-20 PROCEDURE — 25000132 ZZH RX MED GY IP 250 OP 250 PS 637: Mod: GY | Performed by: STUDENT IN AN ORGANIZED HEALTH CARE EDUCATION/TRAINING PROGRAM

## 2018-03-20 PROCEDURE — 25000125 ZZHC RX 250: Performed by: INTERNAL MEDICINE

## 2018-03-20 PROCEDURE — 12400001 ZZH R&B MH UMMC

## 2018-03-20 PROCEDURE — 96374 THER/PROPH/DIAG INJ IV PUSH: CPT | Performed by: EMERGENCY MEDICINE

## 2018-03-20 PROCEDURE — 25000132 ZZH RX MED GY IP 250 OP 250 PS 637: Mod: GY | Performed by: EMERGENCY MEDICINE

## 2018-03-20 PROCEDURE — 25000125 ZZHC RX 250: Performed by: EMERGENCY MEDICINE

## 2018-03-20 PROCEDURE — A9270 NON-COVERED ITEM OR SERVICE: HCPCS | Mod: GY | Performed by: STUDENT IN AN ORGANIZED HEALTH CARE EDUCATION/TRAINING PROGRAM

## 2018-03-20 PROCEDURE — 25000128 H RX IP 250 OP 636: Performed by: STUDENT IN AN ORGANIZED HEALTH CARE EDUCATION/TRAINING PROGRAM

## 2018-03-20 PROCEDURE — A9270 NON-COVERED ITEM OR SERVICE: HCPCS | Mod: GY | Performed by: EMERGENCY MEDICINE

## 2018-03-20 PROCEDURE — 25000125 ZZHC RX 250: Performed by: ANESTHESIOLOGY

## 2018-03-20 RX ORDER — OLANZAPINE 10 MG/2ML
10 INJECTION, POWDER, FOR SOLUTION INTRAMUSCULAR
Status: DISCONTINUED | OUTPATIENT
Start: 2018-03-20 | End: 2018-03-21 | Stop reason: HOSPADM

## 2018-03-20 RX ORDER — NALOXONE HYDROCHLORIDE 0.4 MG/ML
.1-.4 INJECTION, SOLUTION INTRAMUSCULAR; INTRAVENOUS; SUBCUTANEOUS
Status: DISCONTINUED | OUTPATIENT
Start: 2018-03-20 | End: 2018-03-20

## 2018-03-20 RX ORDER — SCOLOPAMINE TRANSDERMAL SYSTEM 1 MG/1
1 PATCH, EXTENDED RELEASE TRANSDERMAL ONCE
Status: COMPLETED | OUTPATIENT
Start: 2018-03-20 | End: 2018-03-20

## 2018-03-20 RX ORDER — FLUMAZENIL 0.1 MG/ML
0.2 INJECTION, SOLUTION INTRAVENOUS
Status: DISCONTINUED | OUTPATIENT
Start: 2018-03-20 | End: 2018-03-20

## 2018-03-20 RX ORDER — LURASIDONE HYDROCHLORIDE 20 MG/1
20 TABLET, FILM COATED ORAL EVERY MORNING
Status: DISCONTINUED | OUTPATIENT
Start: 2018-03-20 | End: 2018-03-21 | Stop reason: HOSPADM

## 2018-03-20 RX ORDER — ONDANSETRON 4 MG/1
4 TABLET, ORALLY DISINTEGRATING ORAL EVERY 6 HOURS PRN
Status: DISCONTINUED | OUTPATIENT
Start: 2018-03-20 | End: 2018-03-21 | Stop reason: HOSPADM

## 2018-03-20 RX ORDER — ONDANSETRON 2 MG/ML
INJECTION INTRAMUSCULAR; INTRAVENOUS PRN
Status: DISCONTINUED | OUTPATIENT
Start: 2018-03-20 | End: 2018-03-20

## 2018-03-20 RX ORDER — ONDANSETRON 4 MG/1
4 TABLET, ORALLY DISINTEGRATING ORAL ONCE
Status: COMPLETED | OUTPATIENT
Start: 2018-03-20 | End: 2018-03-20

## 2018-03-20 RX ORDER — IBUPROFEN 600 MG/1
600 TABLET, FILM COATED ORAL ONCE
Status: COMPLETED | OUTPATIENT
Start: 2018-03-20 | End: 2018-03-20

## 2018-03-20 RX ORDER — ACETAMINOPHEN 500 MG
1000 TABLET ORAL
Status: DISCONTINUED | OUTPATIENT
Start: 2018-03-20 | End: 2018-03-21 | Stop reason: HOSPADM

## 2018-03-20 RX ORDER — HYDROXYZINE HYDROCHLORIDE 25 MG/1
25 TABLET, FILM COATED ORAL EVERY 4 HOURS PRN
Status: DISCONTINUED | OUTPATIENT
Start: 2018-03-20 | End: 2018-03-21 | Stop reason: HOSPADM

## 2018-03-20 RX ORDER — LURASIDONE HYDROCHLORIDE 20 MG/1
20 TABLET, FILM COATED ORAL EVERY MORNING
Status: DISCONTINUED | OUTPATIENT
Start: 2018-03-21 | End: 2018-03-20

## 2018-03-20 RX ORDER — DIPHENHYDRAMINE HYDROCHLORIDE 50 MG/ML
12.5 INJECTION INTRAMUSCULAR; INTRAVENOUS ONCE
Status: COMPLETED | OUTPATIENT
Start: 2018-03-20 | End: 2018-03-20

## 2018-03-20 RX ORDER — LANOLIN ALCOHOL/MO/W.PET/CERES
3 CREAM (GRAM) TOPICAL
Status: DISCONTINUED | OUTPATIENT
Start: 2018-03-20 | End: 2018-03-21 | Stop reason: HOSPADM

## 2018-03-20 RX ORDER — ONDANSETRON 4 MG/1
4 TABLET, ORALLY DISINTEGRATING ORAL
Status: DISCONTINUED | OUTPATIENT
Start: 2018-03-20 | End: 2018-03-20

## 2018-03-20 RX ORDER — PROMETHAZINE HYDROCHLORIDE 25 MG/ML
25 INJECTION, SOLUTION INTRAMUSCULAR; INTRAVENOUS ONCE
Status: COMPLETED | OUTPATIENT
Start: 2018-03-20 | End: 2018-03-20

## 2018-03-20 RX ORDER — EPHEDRINE SULFATE 50 MG/ML
INJECTION, SOLUTION INTRAMUSCULAR; INTRAVENOUS; SUBCUTANEOUS PRN
Status: DISCONTINUED | OUTPATIENT
Start: 2018-03-20 | End: 2018-03-20

## 2018-03-20 RX ORDER — DESVENLAFAXINE 50 MG/1
100 TABLET, FILM COATED, EXTENDED RELEASE ORAL EVERY MORNING
Status: DISCONTINUED | OUTPATIENT
Start: 2018-03-21 | End: 2018-03-20

## 2018-03-20 RX ORDER — OLANZAPINE 10 MG/1
10 TABLET ORAL
Status: DISCONTINUED | OUTPATIENT
Start: 2018-03-20 | End: 2018-03-21 | Stop reason: HOSPADM

## 2018-03-20 RX ORDER — FENTANYL CITRATE 50 UG/ML
25-50 INJECTION, SOLUTION INTRAMUSCULAR; INTRAVENOUS
Status: DISCONTINUED | OUTPATIENT
Start: 2018-03-20 | End: 2018-03-20 | Stop reason: CLARIF

## 2018-03-20 RX ORDER — DESVENLAFAXINE 50 MG/1
100 TABLET, FILM COATED, EXTENDED RELEASE ORAL EVERY MORNING
Status: DISCONTINUED | OUTPATIENT
Start: 2018-03-21 | End: 2018-03-21 | Stop reason: HOSPADM

## 2018-03-20 RX ORDER — ONDANSETRON 2 MG/ML
4 INJECTION INTRAMUSCULAR; INTRAVENOUS EVERY 6 HOURS PRN
Status: DISCONTINUED | OUTPATIENT
Start: 2018-03-20 | End: 2018-03-21 | Stop reason: HOSPADM

## 2018-03-20 RX ORDER — TRAZODONE HYDROCHLORIDE 50 MG/1
50 TABLET, FILM COATED ORAL
Status: DISCONTINUED | OUTPATIENT
Start: 2018-03-20 | End: 2018-03-21 | Stop reason: HOSPADM

## 2018-03-20 RX ADMIN — PROCHLORPERAZINE EDISYLATE 5 MG: 5 INJECTION INTRAMUSCULAR; INTRAVENOUS at 02:26

## 2018-03-20 RX ADMIN — OLANZAPINE 10 MG: 10 TABLET, FILM COATED ORAL at 18:54

## 2018-03-20 RX ADMIN — PHENYLEPHRINE HYDROCHLORIDE 50 MCG: 10 INJECTION, SOLUTION INTRAMUSCULAR; INTRAVENOUS; SUBCUTANEOUS at 00:01

## 2018-03-20 RX ADMIN — ONDANSETRON 4 MG: 4 TABLET, ORALLY DISINTEGRATING ORAL at 18:18

## 2018-03-20 RX ADMIN — LURASIDONE HYDROCHLORIDE 20 MG: 20 TABLET, FILM COATED ORAL at 21:39

## 2018-03-20 RX ADMIN — PHENYLEPHRINE HYDROCHLORIDE 50 MCG: 10 INJECTION, SOLUTION INTRAMUSCULAR; INTRAVENOUS; SUBCUTANEOUS at 00:07

## 2018-03-20 RX ADMIN — HYDROMORPHONE HYDROCHLORIDE 0.3 MG: 1 INJECTION, SOLUTION INTRAMUSCULAR; INTRAVENOUS; SUBCUTANEOUS at 01:34

## 2018-03-20 RX ADMIN — ONDANSETRON 4 MG: 2 INJECTION INTRAMUSCULAR; INTRAVENOUS at 00:19

## 2018-03-20 RX ADMIN — IBUPROFEN 600 MG: 600 TABLET ORAL at 09:50

## 2018-03-20 RX ADMIN — PROMETHAZINE HYDROCHLORIDE 25 MG: 25 INJECTION INTRAMUSCULAR; INTRAVENOUS at 01:24

## 2018-03-20 RX ADMIN — DIPHENHYDRAMINE HYDROCHLORIDE 12.5 MG: 50 INJECTION, SOLUTION INTRAMUSCULAR; INTRAVENOUS at 01:23

## 2018-03-20 RX ADMIN — ONDANSETRON 4 MG: 2 INJECTION INTRAMUSCULAR; INTRAVENOUS at 00:57

## 2018-03-20 RX ADMIN — HYDROXYZINE HYDROCHLORIDE 25 MG: 25 TABLET ORAL at 18:54

## 2018-03-20 RX ADMIN — HYDROMORPHONE HYDROCHLORIDE 0.3 MG: 1 INJECTION, SOLUTION INTRAMUSCULAR; INTRAVENOUS; SUBCUTANEOUS at 01:04

## 2018-03-20 RX ADMIN — PHENYLEPHRINE HYDROCHLORIDE 100 MCG: 10 INJECTION, SOLUTION INTRAMUSCULAR; INTRAVENOUS; SUBCUTANEOUS at 00:13

## 2018-03-20 RX ADMIN — HYDROMORPHONE HYDROCHLORIDE 0.4 MG: 1 INJECTION, SOLUTION INTRAMUSCULAR; INTRAVENOUS; SUBCUTANEOUS at 01:14

## 2018-03-20 RX ADMIN — PHENYLEPHRINE HYDROCHLORIDE 50 MCG: 10 INJECTION, SOLUTION INTRAMUSCULAR; INTRAVENOUS; SUBCUTANEOUS at 00:22

## 2018-03-20 RX ADMIN — FENTANYL CITRATE 25 MCG: 50 INJECTION INTRAMUSCULAR; INTRAVENOUS at 00:59

## 2018-03-20 RX ADMIN — SCOPALAMINE 1 PATCH: 1 PATCH, EXTENDED RELEASE TRANSDERMAL at 01:07

## 2018-03-20 RX ADMIN — FENTANYL CITRATE 25 MCG: 50 INJECTION INTRAMUSCULAR; INTRAVENOUS at 00:51

## 2018-03-20 RX ADMIN — ONDANSETRON 4 MG: 4 TABLET, ORALLY DISINTEGRATING ORAL at 07:07

## 2018-03-20 RX ADMIN — FENTANYL CITRATE 50 MCG: 50 INJECTION INTRAMUSCULAR; INTRAVENOUS at 00:41

## 2018-03-20 RX ADMIN — Medication 2.5 MG: at 00:08

## 2018-03-20 RX ADMIN — LEVONORGESTREL 20 MCG: 52 INTRAUTERINE DEVICE INTRAUTERINE at 21:42

## 2018-03-20 RX ADMIN — HYDROMORPHONE HYDROCHLORIDE 0.4 MG: 1 INJECTION, SOLUTION INTRAMUSCULAR; INTRAVENOUS; SUBCUTANEOUS at 01:53

## 2018-03-20 ASSESSMENT — ACTIVITIES OF DAILY LIVING (ADL)
ORAL_HYGIENE: INDEPENDENT
LAUNDRY: WITH SUPERVISION
DRESS: INDEPENDENT
GROOMING: INDEPENDENT

## 2018-03-20 ASSESSMENT — ENCOUNTER SYMPTOMS
SORE THROAT: 0
DYSPHORIC MOOD: 1
COLOR CHANGE: 0
DIARRHEA: 0
SHORTNESS OF BREATH: 0
FEVER: 0
CONFUSION: 0
NECK STIFFNESS: 0
COUGH: 0
ARTHRALGIAS: 0
EYE REDNESS: 0
DIFFICULTY URINATING: 0
APPETITE CHANGE: 0
CONSTIPATION: 0
HEADACHES: 0

## 2018-03-20 NOTE — ED NOTES
"ED to Behavioral Floor Handoff    SITUATION  Nevin Alvarado is a 27 year old female who speaks English and lives in a home alone The patient arrived in the ED by private car from clinic with a complaint of Suicidal (\"Swallowed some open micike pins; reports \"my meds aren't working and making me do crazy things; I don't want to hurt myself\") and Abdominal Pain (Pain in left upper quadrant; swallowed mickie pins about 1700; denies N & V)  .The patient's current symptoms started/worsened 1 day(s) ago and during this time the symptoms have increased.   In the ED, pt was diagnosed with   Final diagnoses:   Swallowed foreign body, initial encounter   Borderline personality disorder in adult   Self-injurious behavior        Initial vitals were: BP: (!) 155/96  Pulse: 93  Heart Rate: 100  Temp: 98.8  F (37.1  C)  Resp: 16  Height: 157.5 cm (5' 2\")  Weight: 110.7 kg (244 lb 1.6 oz)  SpO2: 97 %   --------  Is the patient diabetic? No   If yes, last blood glucose? --     If yes, was this treated in the ED? --  --------  Is the patient inebriated (ETOH) No or Impaired on other substances? No  MSSA done? N/A  Last MSSA score: --    Were withdrawal symptoms treated? N/A  Does the patient have a seizure history? No. If yes, date of most recent seizure--  --------  Is the patient patient experiencing suicidal ideation? denies current or recent suicidal ideation     Homicidal ideation? denies current or recent homicidal ideation or behaviors.    Self-injurious behavior/urges? reports current or recent self injurious behavior or ideation including swallow pins.  ------  Was pt aggressive in the ED No  Was a code called no  Is the pt now cooperative? Yes  -------  Meds given in ED:   Medications   acetaminophen (TYLENOL) tablet 1,000 mg (not administered)   ondansetron (ZOFRAN-ODT) ODT tab 4 mg (not administered)   0.9% sodium chloride BOLUS (0 mLs Intravenous Stopped 3/20/18 0402)   scopolamine (TRANSDERM) 72 hr patch 1 patch (1 " patch Transdermal Given 3/20/18 0107)   diphenhydrAMINE (BENADRYL) injection 12.5 mg (12.5 mg Intravenous Given 3/20/18 0123)   promethazine (PHENERGAN) IV injection 25 mg (25 mg Intravenous Given 3/20/18 0124)   ibuprofen (ADVIL/MOTRIN) tablet 600 mg (600 mg Oral Given 3/20/18 0950)   ondansetron (ZOFRAN-ODT) ODT tab 4 mg (4 mg Oral Given 3/20/18 2738)      Family present during ED course? No  Family currently present? No    BACKGROUND  Does the patient have a cognitive impairment or developmental disability? No  Allergies:   Allergies   Allergen Reactions     Penicillins Anaphylaxis     Blood-Group Specific Substance Other (See Comments)     Patient has an anti-Cw and non-specific antibodies. Blood product orders may be delayed. Draw one red top and two purple top tubes for all type/screen/crossmatch orders.     Hydrocodone Nausea and Vomiting     vomiting for days     Influenza Vaccines Other (See Comments)     Oseltamivir Hives     med stopped, PN: med stopped     Vicodin [Hydrocodone-Acetaminophen] Nausea and Vomiting     Cephalosporins Rash     Lamotrigine Rash     Possibly associated with Lamictal.      Latex Rash   .   Social demographics are   Social History     Social History     Marital status: Single     Spouse name: N/A     Number of children: N/A     Years of education: N/A     Occupational History     On disability      Social History Main Topics     Smoking status: Never Smoker     Smokeless tobacco: Never Used     Alcohol use No     Drug use: No     Sexual activity: Yes     Partners: Male     Birth control/ protection: IUD      Comment: IUD - Mirena - placed July, 2015     Other Topics Concern     None     Social History Narrative    Single.    Living in independent living portion of [employer].    On disability.    No regular exercise.         ASSESSMENT  Labs results   Labs Ordered and Resulted from Time of ED Arrival Up to the Time of Departure from the ED   DRUG ABUSE SCREEN 6 CHEM DEP  URINE ([facility]) - Abnormal; Notable for the following:        Result Value    Benzodiazepine Qual Urine Positive (*)     All other components within normal limits   CBC WITH PLATELETS DIFFERENTIAL - Abnormal; Notable for the following:     WBC 11.1 (*)     Hemoglobin 11.4 (*)     Absolute Neutrophil 8.6 (*)     All other components within normal limits   BASIC METABOLIC PANEL - Abnormal; Notable for the following:     Creatinine 0.50 (*)     All other components within normal limits   HCG QUALITATIVE URINE   INR   BEDSIDE ATTENDANT      Imaging Studies:   Recent Results (from the past 24 hour(s))   Abdomen XR, 2 vw, flat and upright    Narrative    ABDOMEN TWO VIEWS   3/19/2018  8:53 PM     HISTORY: Foreign body ingestion, evaluate for foreign body, eval for  free air.    COMPARISON: None.      Impression    IMPRESSION: Elongated metallic radiopaque foreign body is identified  at the upper mid abdomen and extending to the left upper abdomen. The  length of this structure is 11.2 cm. There is no visible free air  identified. No bowel obstruction is seen. The position of this  approximates the expected location of the stomach. IUD incidentally  seen at the pelvis.    SHILA ALFREDO MD   XR Abdomen Port 1 View    Narrative    EXAM: XR ABDOMEN PORT 1 VW  3/20/2018 1:10 AM      HISTORY: Eval for retained foreign body post procedure;     COMPARISON: 3/19/2018    FINDINGS: Supine AP view of the abdomen obtained. No unexpected  radiopaque foreign bodies. Intrauterine device is present.  Nonobstructive bowel gas pattern. No definite free air. No pneumobilia  or portal venous gas in the visualized right upper quadrant. IUD  present within the pelvis, unchanged from 3/19/2018.      Impression    IMPRESSION: Interval removal of the previously seen radiodense foreign  body. No other radiodense foreign bodies visualized.    I have personally reviewed the examination and initial interpretation  and I agree with the findings.    TAMIKO  "MD TAYLOR      Most recent vital signs /72  Pulse 93  Temp 97.6  F (36.4  C) (Axillary)  Resp 15  Ht 1.575 m (5' 2\")  Wt 110.7 kg (244 lb 1.6 oz)  LMP   SpO2 94%  BMI 44.65 kg/m2   Abnormal labs/tests/findings requiring intervention:---   Pain control: fair  Nausea control: good    RECOMMENDATION  Are any infection precautions needed (MRSA, VRE, etc.)? No If yes, what infection? --  ---  Does the patient have mobility issues? independently. If yes, what device does the pt use? ---  ---  Is patient on 72 hour hold or commitment? No If on 72 hour hold, have hold and rights been given to patient? N/A  Are admitting orders written if after 10 p.m. ?N/A  Tasks needing to be completed:---     Aaliyah Zimmerman   [facility]-- [phone] [place] ED   [phone] [facility] ED  "

## 2018-03-20 NOTE — ANESTHESIA PREPROCEDURE EVALUATION
Anesthesia Evaluation     . Pt has had prior anesthetic. Type: General    No history of anesthetic complications          ROS/MED HX    ENT/Pulmonary:       Neurologic:       Cardiovascular:         METS/Exercise Tolerance:     Hematologic:         Musculoskeletal:         GI/Hepatic:         Renal/Genitourinary:         Endo:     (+) Obesity, .      Psychiatric: Comment: Diagnoses of PTSD, borderline personality disorder, depression and anxiety.  The patient lives in an independent group home as a result of her severe mental illness.  She has a history of ingesting foreign objects requiring procedural/surgical intervention.     (+) psychiatric history       Infectious Disease:         Malignancy:         Other:                     Physical Exam  Normal systems: dental    Airway   Mallampati: III  TM distance: >3 FB  Neck ROM: full    Dental     Cardiovascular   Rhythm and rate: regular and normal      Pulmonary    breath sounds clear to auscultation                    Anesthesia Plan      History & Physical Review  History and physical reviewed and following examination; no interval change.    ASA Status:  3 emergent.    NPO Status:  Waived due to emergency    Plan for General, RSI and ETT with Intravenous induction. Maintenance will be Balanced.    PONV prophylaxis:  Ondansetron (or other 5HT-3) and Dexamethasone or Solumedrol       Postoperative Care  Postoperative pain management:  Multi-modal analgesia, IV analgesics and Oral pain medications.      Consents  Anesthetic plan, risks, benefits and alternatives discussed with:  Patient..

## 2018-03-20 NOTE — ED NOTES
26 yo F who ingested 4 hair pins in a suicide attempt.  The hair pins were removed by endoscopy without complication.  Nevin will be admitted to psychiatry for further evaluation.      Florin Andrade MD  03/20/18 0626

## 2018-03-20 NOTE — ANESTHESIA CARE TRANSFER NOTE
Patient: Nevin Alvarado    Procedure(s):  Foreign Body Removal - Wound Class: II-Clean Contaminated    Diagnosis: Foreign Body  Diagnosis Additional Information: No value filed.    Anesthesia Type:   General, RSI, ETT     Note:  Airway :Face Mask  Patient transferred to:PACU  Comments: Transferred to PACU, report given to RN.  VSS.  Appears comfortable.  Handoff Report: Identifed the Patient, Identified the Reponsible Provider, Reviewed the pertinent medical history, Discussed the surgical course, Reviewed Intra-OP anesthesia mangement and issues during anesthesia, Set expectations for post-procedure period and Allowed opportunity for questions and acknowledgement of understanding      Vitals: (Last set prior to Anesthesia Care Transfer)    [facility] VITALS  3/20/2018 0000 - 3/20/2018 0038      3/20/2018             Pulse: 114    Ht Rate: 118    Temp: 36.6  C (97.9  F)    SpO2: 100 %    Resp Rate (observed): 20                Electronically Signed By: Ilya Correa MD  March 20, 2018  12:38 AM

## 2018-03-20 NOTE — H&P
-----------------------------------------------------------------------------------------------------------  Psychiatry History & Physical      Nevin Alvarado MRN# [id number]   Age: 27 year old YOB: 1990     Date of Admission: 3/19/2018                                  Interviewed at 2:40 PM at ED          Contacts:   Primary Outpatient Psychiatrist: Reports that she does not currently have one. Dr. Shira Maynard. In the past.   Primary Physician: Latonya Knight   Therapist: Reports does not currently have.  Family: MomClarita         Assessment:   Nevin Alvarado is a 27 year old female with a history of ADD, anxiety,  MDD, borderline personality disorder, and PTSD who presented to the [place] ED following swallowing hair pins in the context of increasing depressive symptoms and SIB. The patient has had several  psychiatric hospitalizations for swallowing foreign bodies or insertion with and without intent to harm self, most recently  02/13/18 for ingestion of mickie pins. Per chart review she has required at least 11 EGDs, 2 EUAs, and 3 exploratory laparatomies since 2016. She has also had 4 overdoses in the past two years, most recently 4/17/17 with oxycodone. Since past hospitalization, the patient has had ER visits every 1-5 days including 4 in the past two weeks at [facility], [facility], and [facility].     The patient is currently not followed by  providers. Family history non contributory. Current psychosocial stressors include loneliness, unemployment and lack of social support which she has been doing self harm. The MSE is notable for an obese  tearful female who reports depression and SIB and denies any SI/HI. The patient's reported symptoms are consistent with patient's historic diagnosis. Additionally, the patient has traits of personality disorder which interfere with her ability to adhere with the treatment plan.      PTA medications were continued at  time of admission. Appropriate precautions were placed. Given that she is depressed and self-injurious, patient warrants inpatient psychiatric hospitalization to maintain her safety. Disposition pending clinical stabilization, medication optimization and development of an appropriate discharge plan.     >> SUICIDE RISK ASSESSMENT:  Today Nevin Alvarado reports SIB and denies Suicidal thoughts.  In addition, she has notable risk factors for self-harm including SIB, worsening symptoms, hopelessness, previous suicide attempt, recent inpt stay and lives alone/ isolated.  However, risk is mitigated by no plan or intent, describes a safety plan, h/o seeking help when needed, stable housing and participation in DBT or day program.         Plan   - Admit to station 22 under the care of Dr. Ev Groves. To be staffed by Dr. Groves in the AM.  - Patient will be treated in therapeutic milieu with appropriate individual and group therapies as described.  - Legal Status: Voluntary  - Safety Assessment:    - Checks: Status 15   - Precautions: Suicide  Self-harm   - Pt has not required locked seclusion or restraints in the past 24 hours to maintain safety, please refer to RN documentation for further details.    Principal Diagnosis:   # Major Depressive Disorder  # Self-injurious behaviors  # Borderline personality disorder    Medications:    New:     - Olanzapine 10 mg IM/PO for psychiatric emergencies   - Hydroxyzine 25-50 mg PO for anxiety        Continue: PTA medications   - lurasidone (latuda) 20mg PO in AM   - desvenlafaxine (pristiq) 100mg PO in AM   - vitamin D 2000 units PO in AM   - melatonin    Consults: None    Secondary psychiatric diagnoses of concern this admission:   #ADD  #Anxiety disorder   # PTSD  - Monitor    Medical diagnoses to be addressed this admission:    # Foreign body ingestion, s/p endoscopic removal  - Monitor gastrointestinal symptoms  - Laboratory/Imaging/tests: follow up xray was without  "evidence of retained foreign body    The risks, benefits, alternatives and side effects have been discussed and are understood by the patient and/or other caregivers present at the time of the admission.     Disposition:  TBD pending clinical stabilization and establishment of a safe discharge plan.         Chief Concern:   Swallowed mickie pins, \"meds aren't working and making me do crazy things\".  History is obtained from the patient and EMR         History of Present Illness:   Nevin Alvarado is a 27 year old female with a history of ADD, anxiety,  MDD, borderline personality disorder, and PTSD who presented to the [facility] ED following swallowing hair pins in the context of increasing depressive symptoms and worsening compulsions to self-injure.     Per ED note: \" The patient has a history of repeated episodes of self-harm by swallowing foreign bodies, literally dozens of times and has required innumerable endoscopy procedures to remove these.  She has required 3 laparotomies in the past year due to ingestion of foreign body or placement of foreign body in the rectum.  She reports that she swallowed mickie pins last night and was seen in the ED at [facility] where she had an endoscopy to remove these.  She subsequently reports that she was discharged and when she got home today, she again began having thoughts of self-harm, so she swallowed four open mickie pins at approximately 5 PM in an attempt to self-harm.  Patient states that she does not want to die and she is not trying to kill her self, but she cannot control the compulsions to self harm.  She has had innumerable mental health hospitalizations all over the [facility] for this.\"\"After swallowing mickie pins tonight at 5 PM, she is noticing some LUQ abdominal discomfort.  She denies nausea or vomiting.  She denies any other coingestants.\"    Patient feels  her medications are not working, endorsing that she has chronic SIB which has significantly " worsened since her last hospitalization when they DC'd Abilify and Lamictal and started her on Latuda. She does not have psychiatrist or a therapist for the past 2 moths and she thinks prior to losing her therapist, she had fewer SIB thoughts and she was able to avoid them at least 50 % of the times, utilizing her DBT skills. Now she has SIB thoughts every day and she feels urge to act upon almost all of those. She reports that she used to live in assisted living place until November when she got her own apartment. She feels lack of structure at her apartment, loneliness, losing her therapist and being on low dose of medication has made her feel depressed, motivated, and unwilling to care for herself. She reports many depressive symptoms that worsened in the past few months. She has tried to connect herself to MH services with little luck.     Nevin Alvarado's goal of this hospitalization is to have her medications adjusted, being connected to outpatient resources and leave by Friday.          Psychiatric History:   Past Diagnoses:ADD, anxiety,  MDD, borderline personality disorder, and PTSD  Past Hospitalizations: psychiatric or foreign body/ingestion hospitalizations: 02/2018, 12/2017, 06/2017, 04/2017, 03/2017, 01/2017, 12/2016 and more prior to this. Additionally, she has had multiple presentations to EDs with foreign body ingestion without admission, including  four times in past two weeks at [facility] and  EDs.  Past Suicidality: denies any intention to harm himself   Past Suicide Attempt:  Last attempt via OD in 2015. She also OD'd last month and got hospitalized but denies any intention to harm  Self-injurious Behavior: See hospitalizations. Frequent ingestion of foreign bodies and multiple overdoses.  Past violence or homicidality: Denies    Prior use of Psychotropic Medication: Numerous. Last trial was Abilify in combination to Lamictal. Abilify stopped due to its activation effect, Lamictal  discontinued because of rash. She has been on Prestique for a long time and is not certain about its effect.          Substance Use/ addiction History:   Denies using tobacco or alcohol or other substances. Denies any alcohol use or benzodiazepine use. History of OD on Oxycodone.          Psychiatric Review of Systems:   Depression:   Reports: depressed mood or irritability, tearfulness, decreased interest, changes in sleep, change in psychomotor activity, changes in appetite, changes in weight, hopelessness, helplessness, impaired concentration, decreased energy  Denies: suicidal ideation, suicidal plan  Janice:   Denies: impulsivity, grandiosity, recklessness, excessive energy, decreased need for sleep, spending beyond means, talkativeness,  racing thoughts, increased goal-directed activities.  Psychosis:   Denies: hallucinations (visual, auditory), paranoia  Anxiety:   Reports: minimal worries about her finances  PTSD:   Reports: history of abuse  OCD:   Reports: obsessive thinking, intrusive thoughts       Past Medical History     Past Medical History:   Diagnosis Date     ADD (attention deficit disorder)      Anorexia nervosa with bulimia     history of; on Topamax     Anxiety      Borderline personality disorder      Depression      Depressive disorder      H/O self-harm      Lives in independent group home     due to debilitating mental illness     Migraine without aura     no known triggers; on Topamax bid and Imitrex PRN     Morbid obesity (H)      PTSD (post-traumatic stress disorder)      Rectal foreign body - Recurrent issue, self placed      Swallowed foreign body - Recurrent issue, self placed      Past Surgical History:   Procedure Laterality Date     ESOPHAGOSCOPY, GASTROSCOPY, DUODENOSCOPY (EGD), COMBINED N/A 3/9/2017    Procedure: COMBINED ESOPHAGOSCOPY, GASTROSCOPY, DUODENOSCOPY (EGD), REMOVE FOREIGN BODY;  Surgeon: Avis Guzmán MD;  [facility]     ESOPHAGOSCOPY, GASTROSCOPY,  DUODENOSCOPY (EGD), COMBINED N/A 4/20/2017    Procedure: COMBINED ESOPHAGOSCOPY, GASTROSCOPY, DUODENOSCOPY (EGD), REMOVE FOREIGN BODY;  EGD removal Foregin body;  Surgeon: Lokesh Paula MD;  [facility]     ESOPHAGOSCOPY, GASTROSCOPY, DUODENOSCOPY (EGD), COMBINED N/A 6/12/2017    Procedure: COMBINED ESOPHAGOSCOPY, GASTROSCOPY, DUODENOSCOPY (EGD);  COMBINED ESOPHAGOSCOPY, GASTROSCOPY, DUODENOSCOPY (EGD) [[id number]]attempted removal of foreign body;  Surgeon: Pamela Perez MD;  [facility]     ESOPHAGOSCOPY, GASTROSCOPY, DUODENOSCOPY (EGD), COMBINED N/A 6/9/2017    Procedure: COMBINED ESOPHAGOSCOPY, GASTROSCOPY, DUODENOSCOPY (EGD), REMOVE FOREIGN BODY;  Esophagoscopy, Gastroscopy, Duodenoscopy, Removal of Foreign Body;  Surgeon: Dejon Marsh MD;  [facility]     ESOPHAGOSCOPY, GASTROSCOPY, DUODENOSCOPY (EGD), COMBINED N/A 1/6/2018    Procedure: COMBINED ESOPHAGOSCOPY, GASTROSCOPY, DUODENOSCOPY (EGD), REMOVE FOREIGN BODY;  COMBINED ESOPHAGOSCOPY, GASTROSCOPY, DUODENOSCOPY (EGD) [by pascal net and snare with profol sedation;  Surgeon: Feliciano Emmanuel MD;  Location: [facility]     EXAM UNDER ANESTHESIA ANUS N/A 1/10/2017    Procedure: EXAM UNDER ANESTHESIA ANUS;  Surgeon: Annmarie Haynes MD;  [facility]     HC REMOVE FECAL IMPACTION OR FB W ANESTHESIA N/A 12/18/2016    Procedure: REMOVE FECAL IMPACTION/FOREIGN BODY UNDER ANESTHESIA;  Surgeon: Soham Cano MD;  Location: [facility] OR     KNEE SURGERY - removed a small tissue mass from knee Right      LAPAROSCOPIC ABLATION ENDOMETRIOSIS       LAPAROTOMY EXPLORATORY N/A 1/10/2017    Procedure: LAPAROTOMY EXPLORATORY;  Surgeon: Annmarie Haynes MD;  [facility]     lymph nodes removed from neck; benign  age 7     MAMMOPLASTY REDUCTION Bilateral      RELEASE CARPAL TUNNEL Bilateral      SIGMOIDOSCOPY FLEXIBLE N/A 1/10/2017    Procedure: SIGMOIDOSCOPY FLEXIBLE;  Surgeon: Annmarie Haynes MD;   [facility]         Medical Review of Systems:   The Review of Systems is negative other than noted above.        Medications:     Desvenlafaxine succinate (Pristiq) 24 hour tablet 100mg daily  Cholecalciferol (Vitamin D3) tablet 2000 units daily  Lurasidone (Latuda) 20mg daily  Levonorgestrel IUD 20mcg/24h   Melatonin 3mg qhs         Allergies:     Allergies   Allergen Reactions     Penicillins Anaphylaxis     Blood-Group Specific Substance Other (See Comments)     Patient has an anti-Cw and non-specific antibodies. Blood product orders may be delayed. Draw one red top and two purple top tubes for all type/screen/crossmatch orders.     Hydrocodone Nausea and Vomiting     vomiting for days     Influenza Vaccines Other (See Comments)     Oseltamivir Hives     med stopped, PN: med stopped     Vicodin [Hydrocodone-Acetaminophen] Nausea and Vomiting     Cephalosporins Rash     Lamotrigine Rash     Possibly associated with Lamictal.      Latex Rash         Family History:      Reviewed and noncontributory.     Family History   Problem Relation Age of Onset     Type 2 Diabetes Maternal Grandmother      Type 2 Diabetes Paternal Grandmother      Breast Cancer Paternal Grandmother      CEREBROVASCULAR DISEASE Father 53     Myocardial Infarction No family hx of      Coronary Artery Disease Early Onset No family hx of      Ovarian Cancer No family hx of      Colon Cancer No family hx of          Social History   Lives at her apartment, lives alone , close to her mother's home. She has ILS worker, and a nurse who work with her frequent times every week.          Labs:     Results for orders placed or performed during the hospital encounter of 03/19/18 (from the past 24 hour(s))   Drug abuse screen 6 urine (chem dep)   Result Value Ref Range    Amphetamine Qual Urine Negative NEG^Negative    Barbiturates Qual Urine Negative NEG^Negative    Benzodiazepine Qual Urine Positive (A) NEG^Negative    Cannabinoids Qual Urine  Negative NEG^Negative    Cocaine Qual Urine Negative NEG^Negative    Ethanol Qual Urine Negative NEG^Negative    Opiates Qualitative Urine Negative NEG^Negative   HCG qualitative urine (UPT)   Result Value Ref Range    HCG Qual Urine Negative NEG^Negative   CBC with platelets differential   Result Value Ref Range    WBC 11.1 (H) 4.0 - 11.0 10e9/L    RBC Count 4.06 3.8 - 5.2 10e12/L    Hemoglobin 11.4 (L) 11.7 - 15.7 g/dL    Hematocrit 35.4 35.0 - 47.0 %    MCV 87 78 - 100 fl    MCH 28.1 26.5 - 33.0 pg    MCHC 32.2 31.5 - 36.5 g/dL    RDW 14.0 10.0 - 15.0 %    Platelet Count 247 150 - 450 10e9/L    Diff Method Automated Method     % Neutrophils 77.1 %    % Lymphocytes 15.3 %    % Monocytes 5.9 %    % Eosinophils 1.3 %    % Basophils 0.2 %    % Immature Granulocytes 0.2 %    Nucleated RBCs 0 0 /100    Absolute Neutrophil 8.6 (H) 1.6 - 8.3 10e9/L    Absolute Lymphocytes 1.7 0.8 - 5.3 10e9/L    Absolute Monocytes 0.7 0.0 - 1.3 10e9/L    Absolute Eosinophils 0.1 0.0 - 0.7 10e9/L    Absolute Basophils 0.0 0.0 - 0.2 10e9/L    Abs Immature Granulocytes 0.0 0 - 0.4 10e9/L    Absolute Nucleated RBC 0.0    Basic metabolic panel   Result Value Ref Range    Sodium 141 133 - 144 mmol/L    Potassium 4.0 3.4 - 5.3 mmol/L    Chloride 107 94 - 109 mmol/L    Carbon Dioxide 26 20 - 32 mmol/L    Anion Gap 8 3 - 14 mmol/L    Glucose 88 70 - 99 mg/dL    Urea Nitrogen 9 7 - 30 mg/dL    Creatinine 0.50 (L) 0.52 - 1.04 mg/dL    GFR Estimate >90 >60 mL/min/1.7m2    GFR Estimate If Black >90 >60 mL/min/1.7m2    Calcium 8.8 8.5 - 10.1 mg/dL   Abdomen XR, 2 vw, flat and upright    Narrative    ABDOMEN TWO VIEWS   3/19/2018  8:53 PM     HISTORY: Foreign body ingestion, evaluate for foreign body, eval for  free air.    COMPARISON: None.      Impression    IMPRESSION: Elongated metallic radiopaque foreign body is identified  at the upper mid abdomen and extending to the left upper abdomen. The  length of this structure is 11.2 cm. There is no  visible free air  identified. No bowel obstruction is seen. The position of this  approximates the expected location of the stomach. IUD incidentally  seen at the pelvis.    SHILA ALFREDO MD   INR   Result Value Ref Range    INR 1.03 0.86 - 1.14   UPPER GI ENDOSCOPY   Result Value Ref Range    Upper GI Endoscopy       [facility]  [address] [place] [address] [phone]     Endoscopy Department  _______________________________________________________________________________  Patient Name: Nevin Alvarado     Procedure Date: 3/19/2018 10:28 PM  MRN: [id number]                       Account Number: [id number]  YOB: 1990             Admit Type: Inpatient  Age: 27                                Gender: Female  Note Status: Finalized                Attending MD: Brice Guzmán MD  Total Sedation Time:                    _______________________________________________________________________________     Procedure:           Upper GI endoscopy  Indications:         Foreign body in the stomach  Providers:           Brice Guzmán MD, Chana Dotson MD  Referring MD:        Jolly Hernandez MD  Medicines:           General Anesthesia  Complications:       No immediate complications.  _____________________________________________________________________________ __  Procedure:           Pre-Anesthesia Assessment:                       - Prior to the procedure, a History and Physical was                        performed, and patient medications, allergies and                        sensitivities were reviewed. The patient's tolerance of                        previous anesthesia was reviewed.                       - The risks and benefits of the procedure and the                        sedation options and risks were discussed with the                        patient. All questions were answered and informed                        consent was obtained.                       -  Patient identification and proposed procedure were                        verified prior to the procedure by the physician, the                        nurse and the anesthesiologist. The procedure was                        verified in the pre-procedure area in the procedure room.                       - Pre-procedure physical examination revealed no                         contraindications to sedation.                       - Airway Examination: Mallampati Class II (the uvula but                        not tonsillar pillars visualized).                       - ASA Grade Assessment: II - A patient with mild                        systemic disease.                       After obtaining informed consent, the endoscope was                        passed under direct vision. Throughout the procedure,                        the patient's blood pressure, pulse, and oxygen                        saturations were monitored continuously. The Endoscope                        was introduced through the mouth, and advanced to the                        third part of duodenum. The upper GI endoscopy was                        accomplished without difficulty. The patient tolerated                        the procedure well.                                                                                   Findings:       The examined esophagus was normal.       Four chris y hair pins (blunt objects) were found in the antrum impacted        within prepyloric region of the stomach. Removal was accomplished        successfully with a snare retrieval using an endoscopic simons protector.       A few localized diminutive erosions were found in the gastric fundus, in        the gastric body, and in the gastric antrum. Lesions most likely related        to localized mucosal trauma from the ingested hair pins.       A single 3 mm mucosal nodule was found in the cardia, just distal to the        GE junction, with a focal superficial  ulceration. Suspect recent trauma        from hair pin ingestion with impaction at the GE junction prior to        spontaneous passage into the stomach.       No other significant abnormalities were identified in a careful        examination of the stomach.       The examined duodenum was normal. No additional retained objects were        noted at this time through the distal duodenum.                                                                                    Impression:          - Normal esophagus.                       - Four mickie hair pins (blunt objects) were found in the                        antrum impacted within prepyloric region of the stomach.                        Removal was accomplished successfully with a snare                        retrieval using an endoscopic simons protector.                       - A few localized diminutive erosions were found in the                        gastric fundus, in the gastric body, and in the gastric                        antrum. Lesions most likely related to localized mucosal                        trauma from the ingested hair pins.                       - A single 3 mm mucosal nodule was found in the cardia,                        just distal to the GE junction, with a focal superficial                        ulceration. Suspect recent trauma from hair pin                        ingestion with impaction at the GE junction prior to                        s pontaneous passage into the stomach.                       - Stomach otherwise normal.                       - Normal examined duodenum.                       - No evidence of major mucosal trauma on second-look                        endoscopy following retrieval, no additional retained                        objects identified on this exam through the distal                        duodenum.  Recommendation:      - Return patient to PACU, recommend STAT AXR to confirm                        successful  retrieval of the 4 identified objects on                        prior imaging.                       - Strongly recommend 1:1 sitter at all times while                        admitted and under medical care, awaiting transfer back                        to [facility] ED for inpatient Psychiatry admission. Very                        high-risk for re-ingestion/insertion given extensive                        documented prior history at multiple medical centers                        over t he last 1-2 years per chart review. PACU and ED                        counseled accordingly.                       - If post-ingestion abdominal or esophageal/swallowing                        discomfort, consider GI cocktail accordingly.                       - The findings and recommendations were discussed with                        the patient following the procedure.                       - The findings and recommendations were discussed with                        the referring physician.                                                                                     Electronically signed by GABBIE Guzmán  _________________  Brice Guzmán MD  3/20/2018 12:53:14 AM  I was physically present for the entire viewing portion of the exam.  __________________________  Signature of teaching physician  BERNIEc/Jessica Guzmán MD    _________________  Chana Dotson MD  Number of Addenda: 0    Note Initiated On: 3/19/2018 10:28 PM  Scope In:  Scope Out:     XR Abdomen Port 1 View    Narrative    EXAM: XR ABDOMEN PORT 1 VW  3/20/2018 1:10 AM      HISTORY: Eval for retained foreign body post procedure;     COMPARISON: 3/19/2018    FINDINGS: Supine AP view of the abdomen obtained. No unexpected  radiopaque foreign bodies. Intrauterine device is present.  Nonobstructive bowel gas pattern. No definite free air. No pneumobilia  or portal venous gas in the visualized right upper quadrant. IUD  present within the pelvis, unchanged from  3/19/2018.      Impression    IMPRESSION: Interval removal of the previously seen radiodense foreign  body. No other radiodense foreign bodies visualized.    I have personally reviewed the examination and initial interpretation  and I agree with the findings.    TAMIKO VAZQUEZ MD       Laboratory/Imaging/tests:   - Admission orders including LFT, TSH with T4, UDS, UPT, Vitamin B12 levels , folate level, Vitamin D level  ordered         Psychiatric Examination:     Appearance:  awake, alert, dressed in hospital scrubs, appeared as age stated and unkempt  Behavior/Attitude: appropriate cooperative  Eye Contact:  good, fair  Mood:  depressed and sad  Affect:  mood congruent, intensity is blunted, constricted mobility, full range and reactive  Speech:  clear, coherent  Psychomotor Behavior:  no evidence of tardive dyskinesia, dystonia, or tics  Thought Process:  logical, linear and goal oriented  Associations:  no loose associations  Thought Content:  no evidence of suicidal ideation or homicidal ideation, thoughts of self-harm, which are increased, no auditory hallucinations present and no visual hallucinations present  Insight:  fair  Judgment:  limited  Oriented to:  time, person, and place  Attention Span and Concentration:  Attention, concentration, and memory were not formally tested but seems to be intact at this interview.  Recent and Remote Memory:  intact  Language: communicates coherently in conversational context  Fund of Knowledge: appropriate  Muscle Strength and Tone: normal  Gait and Station: Normal         Physical Examination:   Nevin Alvarado was medically cleared for admission to inpatient psychiatric unit. Please refer to note by, , dated 03/20/2018 for details of physical exam.      Attestation:  Patient has been seen and evaluated by me,  Richie Bowers MD Psychiatry Resident, PGY-2.      For attending attestation statement, see progress note dated March 21, 2018.   Ev  MD David

## 2018-03-20 NOTE — BRIEF OP NOTE
[facility] [place]    Brief Operative Note    Pre-operative diagnosis: Foreign Body  Post-operative diagnosis Foreign Body Removal  Procedure: Procedure(s):  Foreign Body Removal - Wound Class: II-Clean Contaminated  Surgeon: Surgeon(s) and Role:     * Brice Guzmán MD - Primary     * Chana Dotson MD - Assisting  Anesthesia: General   Estimated blood loss: Minimal  Drains: None  Specimens: 4 Hairpins removed  Findings:   4 hair pins identified in antrum and pylorus, moved into gastric body and then removed with snare under a latex simons. Second look following removal of objects identifed mild mucosal abrasions without significant injury. Exam otherwise normal..  Complications: None.  Implants: None.  Recommendations:  - Obtain repeat abdominal x-ray to ensure no retained foreign bodies  - 1:1 sitter to monitor closely and avoid repeat ingestion  - Diet as tolerated  - Plan to transfer patient back to [place] ED  - Agree with plan for psychiatry admission      The above was discussed with the patient, who expressed understanding    Also discussed with Dr. Ravi in the [place] emergency department.    Chana Dotson MD  GI Fellow  Pager: [phone]

## 2018-03-20 NOTE — ED PROVIDER NOTES
"    [facility] EMERGENCY DEPARTMENT ([facility])    3/19/18       History     Chief Complaint   Patient presents with     Suicidal     \"Swallowed some open mickie pins; reports \"my meds aren't working and making me do crazy things; I don't want to hurt myself\"     Abdominal Pain     Pain in left upper quadrant; swallowed mickie pins about 1700; denies N & V     The history is provided by the patient.     Nevin Alvarado is a very complex 27 year old female who presents to the Emergency Department today for abdominal pain related to swallowing foreign bodies in an attempt to self-harm.  Hx of ADD, anxiety, depression, borderline personality disorder, PTSD. The patient has a history of repeated episodes of self-harm by swallowing foreign bodies, literally dozens of times and has required innumerable endoscopy procedures to remove these.  She has required 3 laparotomies in the past year due to ingestion of foreign body or placement of foreign body in the rectum.  She reports that she swallowed mickie pins last night and was seen in the ED at [facility] where she had an endoscopy to remove these.  She subsequently reports that she was discharged and when she got home today, she again began having thoughts of self-harm, so she swallowed four open mickie pins at approximately 5 PM in an attempt to self-harm.  Patient states that she does not want to die and she is not trying to kill her self, but she cannot control the compulsions to self harm.  She has had innumerable mental health hospitalizations all over the [facility] for this.  Inexplicably, the patient says that she lives alone.  She does not currently have a psychiatrist or therapist.  Patient states that her previous psychiatrist switched her from Lamictal and Abilify to Latuda approximately a month ago.  Since that time, she has had increasing thoughts and increasing compulsions to harm.  She states that her previous psychiatrist told her that she would " benefit from a hospital-based psychiatrist, but she does not have a current referral to one.  The patient also reports that she does not have a therapist at present as the therapist was in the same clinic as her psychiatrist.  She denies alcohol use or drug use.  She states that she is taking her regular medications, but is unable to control the compulsions to self harm.    After swallowing mickie pins tonight at 5 PM, she is noticing some LUQ abdominal discomfort.  She denies nausea or vomiting.  She denies any other coingestants.    I have reviewed the Medications, Allergies, Past Medical and Surgical History, and Social History in the Take the Interview system.    Past Medical History:   Diagnosis Date     ADD (attention deficit disorder)      Anorexia nervosa with bulimia     history of; on Topamax     Anxiety      Borderline personality disorder      Depression      Depressive disorder      H/O self-harm      Lives in independent group home     due to debilitating mental illness     Migraine without aura     no known triggers; on Topamax bid and Imitrex PRN     Morbid obesity (H)      PTSD (post-traumatic stress disorder)      Rectal foreign body - Recurrent issue, self placed      Swallowed foreign body - Recurrent issue, self placed        Past Surgical History:   Procedure Laterality Date     ESOPHAGOSCOPY, GASTROSCOPY, DUODENOSCOPY (EGD), COMBINED N/A 3/9/2017    Procedure: COMBINED ESOPHAGOSCOPY, GASTROSCOPY, DUODENOSCOPY (EGD), REMOVE FOREIGN BODY;  Surgeon: Avis Guzmán MD;  Location:  OR     ESOPHAGOSCOPY, GASTROSCOPY, DUODENOSCOPY (EGD), COMBINED N/A 4/20/2017    Procedure: COMBINED ESOPHAGOSCOPY, GASTROSCOPY, DUODENOSCOPY (EGD), REMOVE FOREIGN BODY;  EGD removal Foregin body;  Surgeon: Lokesh Paula MD;  Location: [facility] OR     ESOPHAGOSCOPY, GASTROSCOPY, DUODENOSCOPY (EGD), COMBINED N/A 6/12/2017    Procedure: COMBINED ESOPHAGOSCOPY, GASTROSCOPY, DUODENOSCOPY (EGD);  COMBINED ESOPHAGOSCOPY,  GASTROSCOPY, DUODENOSCOPY (EGD) [[id number]]attempted removal of foreign body;  Surgeon: Pamela Perez MD;  Location: [facility] OR     ESOPHAGOSCOPY, GASTROSCOPY, DUODENOSCOPY (EGD), COMBINED N/A 6/9/2017    Procedure: COMBINED ESOPHAGOSCOPY, GASTROSCOPY, DUODENOSCOPY (EGD), REMOVE FOREIGN BODY;  Esophagoscopy, Gastroscopy, Duodenoscopy, Removal of Foreign Body;  Surgeon: Dejon Marsh MD;  Location: [facility] OR     ESOPHAGOSCOPY, GASTROSCOPY, DUODENOSCOPY (EGD), COMBINED N/A 1/6/2018    Procedure: COMBINED ESOPHAGOSCOPY, GASTROSCOPY, DUODENOSCOPY (EGD), REMOVE FOREIGN BODY;  COMBINED ESOPHAGOSCOPY, GASTROSCOPY, DUODENOSCOPY (EGD) [by pascal net and snare with profol sedation;  Surgeon: Feliciano Emmanuel MD;  Location: [facility] GI     EXAM UNDER ANESTHESIA ANUS N/A 1/10/2017    Procedure: EXAM UNDER ANESTHESIA ANUS;  Surgeon: Annmarie Haynes MD;  Location: [facility] OR     HC REMOVE FECAL IMPACTION OR FB W ANESTHESIA N/A 12/18/2016    Procedure: REMOVE FECAL IMPACTION/FOREIGN BODY UNDER ANESTHESIA;  Surgeon: Soham Cano MD;  Location: [facility] OR     KNEE SURGERY - removed a small tissue mass from knee Right      LAPAROSCOPIC ABLATION ENDOMETRIOSIS       LAPAROTOMY EXPLORATORY N/A 1/10/2017    Procedure: LAPAROTOMY EXPLORATORY;  Surgeon: Annmarie Haynes MD;  Location: [facility] OR     lymph nodes removed from neck; benign  age 7     MAMMOPLASTY REDUCTION Bilateral      RELEASE CARPAL TUNNEL Bilateral      SIGMOIDOSCOPY FLEXIBLE N/A 1/10/2017    Procedure: SIGMOIDOSCOPY FLEXIBLE;  Surgeon: Annmarie Haynes MD;  Location: [facility] OR       Family History   Problem Relation Age of Onset     Type 2 Diabetes Maternal Grandmother      Type 2 Diabetes Paternal Grandmother      Breast Cancer Paternal Grandmother      CEREBROVASCULAR DISEASE Father 53     Myocardial Infarction No family hx of      Coronary Artery Disease Early Onset No family hx of      Ovarian Cancer No family hx of      Colon Cancer No  family hx of        Social History   Substance Use Topics     Smoking status: Never Smoker     Smokeless tobacco: Never Used     Alcohol use No       Current Facility-Administered Medications   Medication     lactated ringers infusion     fentaNYL (PF) (SUBLIMAZE) injection 25-50 mcg     HYDROmorphone (PF) (DILAUDID) injection 0.3-0.5 mg     hydrALAZINE (APRESOLINE) injection 2.5-5 mg     ondansetron (ZOFRAN-ODT) ODT tab 4 mg    Or     ondansetron (ZOFRAN) injection 4 mg     prochlorperazine (COMPAZINE) injection 10 mg     lactated ringers infusion     fentaNYL (PF) (SUBLIMAZE) injection 25-50 mcg     HYDROmorphone (PF) (DILAUDID) injection 0.3-0.5 mg     ORAL Pain Medications -  may administer as ordered by surgeon for take home use     hydrALAZINE (APRESOLINE) injection 2.5-5 mg     ondansetron (ZOFRAN-ODT) ODT tab 4 mg    Or     ondansetron (ZOFRAN) injection 4 mg     prochlorperazine (COMPAZINE) injection 10 mg     naloxone (NARCAN) injection 0.1-0.4 mg     simethicone 133mg/2mL oral suspension        Allergies   Allergen Reactions     Penicillins Anaphylaxis     Blood-Group Specific Substance Other (See Comments)     Patient has an anti-Cw and non-specific antibodies. Blood product orders may be delayed. Draw one red top and two purple top tubes for all type/screen/crossmatch orders.     Hydrocodone Nausea and Vomiting     vomiting for days     Influenza Vaccines Other (See Comments)     Oseltamivir Hives     med stopped, PN: med stopped     Vicodin [Hydrocodone-Acetaminophen] Nausea and Vomiting     Cephalosporins Rash     Lamotrigine Rash     Possibly associated with Lamictal.      Latex Rash         Review of Systems   Constitutional: Negative for appetite change and fever.   HENT: Negative for congestion and sore throat.    Eyes: Negative for redness.   Respiratory: Negative for cough and shortness of breath.    Cardiovascular: Negative for chest pain.   Gastrointestinal: Positive for abdominal pain (LUQ).  "Negative for constipation, diarrhea, nausea and vomiting.   Genitourinary: Negative for difficulty urinating.   Musculoskeletal: Negative for arthralgias and neck stiffness.   Skin: Negative for color change.   Neurological: Negative for headaches.   Psychiatric/Behavioral: Positive for dysphoric mood and self-injury. Negative for confusion and suicidal ideas.   All other systems reviewed and are negative.      Physical Exam   BP: (!) 155/96  Pulse: 93  Heart Rate: 100  Temp: 98.8  F (37.1  C)  Resp: 16  Height: 157.5 cm (5' 2\")  Weight: 110.7 kg (244 lb 1.6 oz)  SpO2: 97 %      Physical Exam   Constitutional: No distress.   Obese adult female, alert, cooperative, NAD   HENT:   Head: Atraumatic.   Mouth/Throat: Oropharynx is clear and moist. No oropharyngeal exudate.   Eyes: Pupils are equal, round, and reactive to light. No scleral icterus.   Cardiovascular: Normal rate, regular rhythm, normal heart sounds and intact distal pulses.    No murmur heard.  Pulmonary/Chest: Effort normal and breath sounds normal. No respiratory distress. She has no wheezes. She has no rales.   Abdominal: Soft. Bowel sounds are normal. There is tenderness.   Obese, soft, mild TTP in LUQ.  Relatively recent laparotomy incision healing well.    Musculoskeletal: She exhibits no edema or tenderness.   Skin: Skin is warm. No rash noted. She is not diaphoretic.   Psychiatric: She has a normal mood and affect. Her speech is normal and behavior is normal. Thought content is not paranoid. She expresses impulsivity and inappropriate judgment. She expresses no suicidal ideation.   Alert, cooperative. Good eye contact. Denies SI but appears to have uncontrollable compulsions. Not agitated or aggressive. No AH/VH.    Nursing note and vitals reviewed.      ED Course     ED Course     Procedures             Critical Care time:  none             Results for orders placed or performed during the hospital encounter of 03/19/18 (from the past 24 hour(s)) "   Drug abuse screen 6 urine (chem dep)   Result Value Ref Range    Amphetamine Qual Urine Negative NEG^Negative    Barbiturates Qual Urine Negative NEG^Negative    Benzodiazepine Qual Urine Positive (A) NEG^Negative    Cannabinoids Qual Urine Negative NEG^Negative    Cocaine Qual Urine Negative NEG^Negative    Ethanol Qual Urine Negative NEG^Negative    Opiates Qualitative Urine Negative NEG^Negative   HCG qualitative urine (UPT)   Result Value Ref Range    HCG Qual Urine Negative NEG^Negative   CBC with platelets differential   Result Value Ref Range    WBC 11.1 (H) 4.0 - 11.0 10e9/L    RBC Count 4.06 3.8 - 5.2 10e12/L    Hemoglobin 11.4 (L) 11.7 - 15.7 g/dL    Hematocrit 35.4 35.0 - 47.0 %    MCV 87 78 - 100 fl    MCH 28.1 26.5 - 33.0 pg    MCHC 32.2 31.5 - 36.5 g/dL    RDW 14.0 10.0 - 15.0 %    Platelet Count 247 150 - 450 10e9/L    Diff Method Automated Method     % Neutrophils 77.1 %    % Lymphocytes 15.3 %    % Monocytes 5.9 %    % Eosinophils 1.3 %    % Basophils 0.2 %    % Immature Granulocytes 0.2 %    Nucleated RBCs 0 0 /100    Absolute Neutrophil 8.6 (H) 1.6 - 8.3 10e9/L    Absolute Lymphocytes 1.7 0.8 - 5.3 10e9/L    Absolute Monocytes 0.7 0.0 - 1.3 10e9/L    Absolute Eosinophils 0.1 0.0 - 0.7 10e9/L    Absolute Basophils 0.0 0.0 - 0.2 10e9/L    Abs Immature Granulocytes 0.0 0 - 0.4 10e9/L    Absolute Nucleated RBC 0.0    Basic metabolic panel   Result Value Ref Range    Sodium 141 133 - 144 mmol/L    Potassium 4.0 3.4 - 5.3 mmol/L    Chloride 107 94 - 109 mmol/L    Carbon Dioxide 26 20 - 32 mmol/L    Anion Gap 8 3 - 14 mmol/L    Glucose 88 70 - 99 mg/dL    Urea Nitrogen 9 7 - 30 mg/dL    Creatinine 0.50 (L) 0.52 - 1.04 mg/dL    GFR Estimate >90 >60 mL/min/1.7m2    GFR Estimate If Black >90 >60 mL/min/1.7m2    Calcium 8.8 8.5 - 10.1 mg/dL   Abdomen XR, 2 vw, flat and upright    Narrative    ABDOMEN TWO VIEWS   3/19/2018  8:53 PM     HISTORY: Foreign body ingestion, evaluate for foreign body, eval  for  free air.    COMPARISON: None.      Impression    IMPRESSION: Elongated metallic radiopaque foreign body is identified  at the upper mid abdomen and extending to the left upper abdomen. The  length of this structure is 11.2 cm. There is no visible free air  identified. No bowel obstruction is seen. The position of this  approximates the expected location of the stomach. IUD incidentally  seen at the pelvis.    SHILA ALFREDO MD   INR   Result Value Ref Range    INR 1.03 0.86 - 1.14   UPPER GI ENDOSCOPY   Result Value Ref Range    Upper GI Endoscopy       [facility]  [address] [place] [address] [phone]     Endoscopy Department  _______________________________________________________________________________  Patient Name: Nevin Alvarado     Procedure Date: 3/19/2018 10:28 PM  MRN: [id number]                       Account Number: [id number]  YOB: 1990             Admit Type: Inpatient  Age: 27                                Gender: Female  Note Status: Finalized                Attending MD: Brice Guzmán MD  Total Sedation Time:                    _______________________________________________________________________________     Procedure:           Upper GI endoscopy  Indications:         Foreign body in the stomach  Providers:           Brice Guzmán MD, Chana Dotson MD  Referring MD:        Jolly Hernandez MD  Medicines:           General Anesthesia  Complications:       No immediate complications.  _____________________________________________________________________________ __  Procedure:           Pre-Anesthesia Assessment:                       - Prior to the procedure, a History and Physical was                        performed, and patient medications, allergies and                        sensitivities were reviewed. The patient's tolerance of                        previous anesthesia was reviewed.                       - The risks and benefits of  the procedure and the                        sedation options and risks were discussed with the                        patient. All questions were answered and informed                        consent was obtained.                       - Patient identification and proposed procedure were                        verified prior to the procedure by the physician, the                        nurse and the anesthesiologist. The procedure was                        verified in the pre-procedure area in the procedure room.                       - Pre-procedure physical examination revealed no                         contraindications to sedation.                       - Airway Examination: Mallampati Class II (the uvula but                        not tonsillar pillars visualized).                       - ASA Grade Assessment: II - A patient with mild                        systemic disease.                       After obtaining informed consent, the endoscope was                        passed under direct vision. Throughout the procedure,                        the patient's blood pressure, pulse, and oxygen                        saturations were monitored continuously. The Endoscope                        was introduced through the mouth, and advanced to the                        third part of duodenum. The upper GI endoscopy was                        accomplished without difficulty. The patient tolerated                        the procedure well.                                                                                   Findings:       The examined esophagus was normal.       Four chris y hair pins (blunt objects) were found in the antrum impacted        within prepyloric region of the stomach. Removal was accomplished        successfully with a snare retrieval using an endoscopic simons protector.       A few localized diminutive erosions were found in the gastric fundus, in        the gastric body, and in the  gastric antrum. Lesions most likely related        to localized mucosal trauma from the ingested hair pins.       A single 3 mm mucosal nodule was found in the cardia, just distal to the        GE junction, with a focal superficial ulceration. Suspect recent trauma        from hair pin ingestion with impaction at the GE junction prior to        spontaneous passage into the stomach.       No other significant abnormalities were identified in a careful        examination of the stomach.       The examined duodenum was normal. No additional retained objects were        noted at this time through the distal duodenum.                                                                                    Impression:          - Normal esophagus.                       - Four mickie hair pins (blunt objects) were found in the                        antrum impacted within prepyloric region of the stomach.                        Removal was accomplished successfully with a snare                        retrieval using an endoscopic simons protector.                       - A few localized diminutive erosions were found in the                        gastric fundus, in the gastric body, and in the gastric                        antrum. Lesions most likely related to localized mucosal                        trauma from the ingested hair pins.                       - A single 3 mm mucosal nodule was found in the cardia,                        just distal to the GE junction, with a focal superficial                        ulceration. Suspect recent trauma from hair pin                        ingestion with impaction at the GE junction prior to                        s pontaneous passage into the stomach.                       - Stomach otherwise normal.                       - Normal examined duodenum.                       - No evidence of major mucosal trauma on second-look                        endoscopy following retrieval, no  additional retained                        objects identified on this exam through the distal                        duodenum.  Recommendation:      - Return patient to PACU, recommend STAT AXR to confirm                        successful retrieval of the 4 identified objects on                        prior imaging.                       - Strongly recommend 1:1 sitter at all times while                        admitted and under medical care, awaiting transfer back                        to [place] ED for inpatient Psychiatry admission. Very                        high-risk for re-ingestion/insertion given extensive                        documented prior history at multiple medical centers                        over t he last 1-2 years per chart review. PACU and ED                        counseled accordingly.                       - If post-ingestion abdominal or esophageal/swallowing                        discomfort, consider GI cocktail accordingly.                       - The findings and recommendations were discussed with                        the patient following the procedure.                       - The findings and recommendations were discussed with                        the referring physician.                                                                                     Electronically signed by GABBIE Guzmán  _________________  Brice Guzmán MD  3/20/2018 12:53:14 AM  I was physically present for the entire viewing portion of the exam.  __________________________  Signature of teaching physician  B4c/Jessica Guzmán MD    _________________  Chana Dotson MD  Number of Addenda: 0    Note Initiated On: 3/19/2018 10:28 PM  Scope In:  Scope Out:                Assessments & Plan (with Medical Decision Making)   This is a 27-year-old female with borderline personality disorder and recurrent attempts at self-harm via swallowing foreign bodies who presents to the ED today again 3 hours after  swallowing body pens, less than 24 hours after endoscopy to remove Alberto pins yesterday.    She was placed on a one-to-one and medically evaluated.  Due to her report of swallowing foreign body combined with her history of documented foreign body ingestions as well as foreign body placements in the rectum, she will be further evaluated medically.  She again had this required 3 laparotomies in the last 12 months for this recurrent problem.    Abdominal x-ray today demonstrates a elongated metallic radiopaque foreign body in the upper mid abdomen extending to the left upper abdomen which is 11 cm in length.  There is no sign of free air.  No evidence of obstruction.  CBC shows white count of 11.1 with a hemoglobin of 11.4, BMP is within normal limits and INR is normal.  After abdominal x-ray resulted, we did contact the GI fellow.    Patient ultimately will need to be admitted to psychiatry, but first is going to need to have this foreign body removed.    The plan at this point will be to transfer her to the [facility] directly to the operating room, PACU5.  They are planning on doing this endoscopy likely under general anesthesia.  After this is removed, and after she recovers from her anesthesia, she will need to be transferred back to the [facility] ED to await mental health bed placement.  I have notified the GI fellow that the patient is going to need a one-to-one sitter or somebody to watch her at all times while she is over in the preop, operating room and PACU.  Once she comes back to the Emergency Department, she will also need a one-to-one.  She is currently voluntary and knows she needs help and wants to come into the hospital.  However, if she were to change her mind and would want to go home, she is very holdable and I would place her on a 72 hour hold.  This has been discussed with the patient and she is agreeable to come into the hospital and is voluntary at this time.    I have spoken to intake and  given them a report on the patient.  After the patient comes back to the Emergency Department after her procedure, we will call intake to let them know that she is medically cleared and can officially go on a list to wait for a bed.      This part of the medical record was transcribed by Marcos Askew, Medical Scribe, from a dictation done by Jolly Ravi MD.       I have reviewed the nursing notes.    I have reviewed the findings, diagnosis, plan and need for follow up with the patient.    Current Discharge Medication List          Final diagnoses:   Swallowed foreign body, initial encounter   Borderline personality disorder in adult   Self-injurious behavior       3/19/2018   [facility], [place], EMERGENCY DEPARTMENT     Jolly Ravi MD  03/20/18 0116

## 2018-03-20 NOTE — PROGRESS NOTES
03/20/18 1754   Patient Belongings   Did you bring any home meds/supplements to the hospital?  No   Patient Belongings cell phone/electronics;clothing;keys;purse;shoes;wallet;money (see comment)   Disposition of Belongings Kept with patient;Locker;Sent to security per site process   Belongings Search Yes   Clothing Search Yes   Second Staff Serena OLIVO       -Items kept on pt-  2 wristbands    -Items placed in pt's locker #[address] on [address]-  Hair binder  2 pairs of socks   Boots  Purse  Wallet  Tank top  2 short sleeve tops  2 pairs of leggings  Bra  Sweater  Fitbit like wristband  Tampon  Tape  [place]  Mittens  Body cream  Deodorant  Phone  Phone   2 usb cables  2 portable chargers  2 ear buds  Underwear  P[place]  [place] Diver's License [id number]  [employer] Card [id number]  [place] Rewards Card  Hyvee Perks Card  M[employer] Card  [employer] Rewards Card  [employer] Membership Card  [employer] Card [id number]   [place] Card [id number]  Medicare Insurance Card [id number]  Social Security Card    -Items sent to security in envelope #[id number]  Go to Card [id number]  [employer] Giftcard [id number]  Visa Card 3859  Debit Visa Card [id number]  [place] EBT Card 0139  Checkbook #z [id number]    *Pt had no cash upon admission     A               Admission:  I am responsible for any personal items that are not sent to the safe or pharmacy.  [facility] is not responsible for loss, theft or damage of any property in my possession.    Signature:  _________________________________ Date: _______  Time: _____                                              Staff Signature:  ____________________________ Date: ________  Time: _____      2nd Staff person, if patient is unable/unwilling to sign:    Signature: ________________________________ Date: ________  Time: _____     Discharge:  [facility] has returned all of my personal belongings:    Signature: _________________________________ Date: ________  Time: _____                                          Staff Signature:  ____________________________ Date:  ________  Time: _____

## 2018-03-20 NOTE — ANESTHESIA POSTPROCEDURE EVALUATION
Patient: Nevin Alvarado    Procedure(s):  Foreign Body Removal - Wound Class: II-Clean Contaminated    Diagnosis:Foreign Body  Diagnosis Additional Information: No value filed.    Anesthesia Type:  General, RSI, ETT    Note:  Anesthesia Post Evaluation    Patient location during evaluation: PACU  Patient participation: Able to fully participate in evaluation  Level of consciousness: awake and alert  Pain management: satisfactory to patient  Airway patency: patent  Cardiovascular status: acceptable and stable  Respiratory status: acceptable and spontaneous ventilation  Hydration status: euvolemic  PONV: controlled     Anesthetic complications: None          Last vitals:  Vitals:    03/19/18 1857 03/20/18 0034 03/20/18 0045   BP: (!) 155/96 125/72 138/85   Pulse: 93     Resp: 16 12    Temp: 37.1  C (98.8  F)     SpO2: 97% 98%          Electronically Signed By: Jeffy Fitzpatrick MD  March 20, 2018  1:03 AM

## 2018-03-20 NOTE — PHARMACY
Admission medication history interview status for the 3/19/2018 admission is complete. See Epic admission navigator for allergy information, pharmacy, prior to admission medications and immunization status.     Medication history interview sources:  patient     Changes made to PTA medication list (reason)  Added: melatonin   Deleted: none   Changed: Vit D dose from 5000 units to 2000 units. Latuda changed to be taken at [facility].     Additional medication history information (including reliability of information, actions taken by pharmacist):None      Prior to Admission medications    Medication Sig Last Dose Taking? Auth Provider   MELATONIN PO Take 3 mg by mouth nightly as needed (sleep) Past Week at Unknown time Yes Unknown, Entered By History   Lurasidone HCl (LATUDA PO) Take 20 mg by mouth every morning  3/19/2018 at Unknown time Yes Reported, Patient   levonorgestrel (MIRENA) 20 MCG/24HR IUD 1 each (20 mcg) by Intrauterine route once for 1 dose 3/19/2018 at Unknown time Yes Sandra Ramos MD   Desvenlafaxine Succinate (PRISTIQ PO) Take 100 mg by mouth every morning  3/19/2018 at Unknown time Yes Reported, Patient   Cholecalciferol (VITAMIN D3 PO) Take 2,000 Units by mouth every morning  3/19/2018 at Unknown time Yes Reported, Patient         Medication history completed by: Veronica Smith, PharmD, BCPS

## 2018-03-21 VITALS
RESPIRATION RATE: 17 BRPM | BODY MASS INDEX: 44.9 KG/M2 | OXYGEN SATURATION: 97 % | HEART RATE: 79 BPM | SYSTOLIC BLOOD PRESSURE: 150 MMHG | TEMPERATURE: 97.1 F | HEIGHT: 62 IN | WEIGHT: 244 LBS | DIASTOLIC BLOOD PRESSURE: 66 MMHG

## 2018-03-21 LAB
ALBUMIN SERPL-MCNC: 3 G/DL (ref 3.4–5)
ALP SERPL-CCNC: 75 U/L (ref 40–150)
ALT SERPL W P-5'-P-CCNC: 16 U/L (ref 0–50)
AST SERPL W P-5'-P-CCNC: 5 U/L (ref 0–45)
BILIRUB DIRECT SERPL-MCNC: <0.1 MG/DL (ref 0–0.2)
BILIRUB SERPL-MCNC: 0.2 MG/DL (ref 0.2–1.3)
CHOLEST SERPL-MCNC: 132 MG/DL
DEPRECATED CALCIDIOL+CALCIFEROL SERPL-MC: 39 UG/L (ref 20–75)
FOLATE SERPL-MCNC: 18.7 NG/ML
HCT VFR BLD AUTO: 33.7 % (ref 35–47)
HDLC SERPL-MCNC: 48 MG/DL
LDLC SERPL CALC-MCNC: 59 MG/DL
NONHDLC SERPL-MCNC: 84 MG/DL
PROT SERPL-MCNC: 6.4 G/DL (ref 6.8–8.8)
TRIGL SERPL-MCNC: 125 MG/DL
TSH SERPL DL<=0.005 MIU/L-ACNC: 0.66 MU/L (ref 0.4–4)
VIT B12 SERPL-MCNC: 279 PG/ML (ref 193–986)

## 2018-03-21 PROCEDURE — 99238 HOSP IP/OBS DSCHRG MGMT 30/<: CPT | Mod: GC | Performed by: PSYCHIATRY & NEUROLOGY

## 2018-03-21 PROCEDURE — 85014 HEMATOCRIT: CPT | Performed by: PSYCHIATRY & NEUROLOGY

## 2018-03-21 PROCEDURE — A9270 NON-COVERED ITEM OR SERVICE: HCPCS | Mod: GY | Performed by: STUDENT IN AN ORGANIZED HEALTH CARE EDUCATION/TRAINING PROGRAM

## 2018-03-21 PROCEDURE — 82746 ASSAY OF FOLIC ACID SERUM: CPT | Performed by: PSYCHIATRY & NEUROLOGY

## 2018-03-21 PROCEDURE — 25000132 ZZH RX MED GY IP 250 OP 250 PS 637: Mod: GY | Performed by: STUDENT IN AN ORGANIZED HEALTH CARE EDUCATION/TRAINING PROGRAM

## 2018-03-21 PROCEDURE — 82306 VITAMIN D 25 HYDROXY: CPT | Performed by: PSYCHIATRY & NEUROLOGY

## 2018-03-21 PROCEDURE — 84443 ASSAY THYROID STIM HORMONE: CPT | Performed by: PSYCHIATRY & NEUROLOGY

## 2018-03-21 PROCEDURE — 36415 COLL VENOUS BLD VENIPUNCTURE: CPT | Performed by: PSYCHIATRY & NEUROLOGY

## 2018-03-21 PROCEDURE — 80061 LIPID PANEL: CPT | Performed by: PSYCHIATRY & NEUROLOGY

## 2018-03-21 PROCEDURE — 82607 VITAMIN B-12: CPT | Performed by: PSYCHIATRY & NEUROLOGY

## 2018-03-21 PROCEDURE — 80076 HEPATIC FUNCTION PANEL: CPT | Performed by: PSYCHIATRY & NEUROLOGY

## 2018-03-21 RX ORDER — LURASIDONE HYDROCHLORIDE 20 MG/1
40 TABLET, FILM COATED ORAL EVERY MORNING
Qty: 12 TABLET | Refills: 0 | Status: ON HOLD | OUTPATIENT
Start: 2018-03-21 | End: 2018-04-17

## 2018-03-21 RX ADMIN — DESVENLAFAXINE SUCCINATE 100 MG: 50 TABLET, EXTENDED RELEASE ORAL at 08:41

## 2018-03-21 RX ADMIN — VITAMIN D, TAB 1000IU (100/BT) 2000 UNITS: 25 TAB at 08:41

## 2018-03-21 RX ADMIN — LURASIDONE HYDROCHLORIDE 20 MG: 20 TABLET, FILM COATED ORAL at 08:40

## 2018-03-21 ASSESSMENT — ACTIVITIES OF DAILY LIVING (ADL)
ORAL_HYGIENE: INDEPENDENT
LAUNDRY: WITH SUPERVISION
DRESS: INDEPENDENT;SCRUBS (BEHAVIORAL HEALTH)
HYGIENE/GROOMING: INDEPENDENT

## 2018-03-21 NOTE — DISCHARGE INSTRUCTIONS
Behavioral Discharge Planning and Instructions      Summary:  You were admitted on 3/20/2018  due to Self Injurious Behaviors as you presented to the emergency department on 3/19/18 after swallowing mickie pins.  You were treated by Dr. Ev Hernandez MD and discharged on 3/21/18 from [facility] Home      Principal Diagnosis:   Major Depressive Disorder  Borderline personality disorder      Health Care Follow-up Appointments:       Monday April 2, 2018 10:00am  with Dr. Rosio De La Rosa  [facility] - [address] [place] [address] ph: [phone] fax: [phone]      Friday April 27, 2018 11:00am  Therapist - Anca Benítez PsyD, LP  [facility] [address] [place] [address] ph: [phone] fax: [phone]      Continue with your in home supports and community supports such as case management as you were previously.    Attend all scheduled appointments with your outpatient providers. Call at least 24 hours in advance if you need to reschedule an appointment to ensure continued access to your outpatient providers.   Major Treatments, Procedures and Findings:  You were provided with: a psychiatric assessment and milieu management    Symptoms to Report: feeling more aggressive, increased confusion, losing more sleep, mood getting worse or thoughts of suicide    Early warning signs can include: increased depression or anxiety sleep disturbances increased thoughts or behaviors of suicide or self-harm     Safety and Wellness:  Take all medicines as directed.  Make no changes unless your doctor suggests them.      Follow treatment recommendations.  Refrain from alcohol and non-prescribed drugs.     Resources:   Crisis Intervention: [phone] or [phone] (TTY: [phone]).  Call anytime for help.  [place] on Mental Illness (www.[place].darnell.org):  566.125.6875 or 090-058-4734.  [facility] Crisis Response [phone]    The treatment team has appreciated the opportunity to work with you.     If you have any questions or concerns our unit number is [phone]

## 2018-03-21 NOTE — PROGRESS NOTES
Per psychiatrist after meeting and assessing patient plan is to discharge today.     patient has psychiatry intake scheduled   Monday April 2, 2018 10:00am  with Dr. Rosio De La Rosa  [facility] - [address] [place] [address] ph: [phone] fax: [phone]      Per Care Everywhere patient was seen at [facility] on 3/1/18 for intake with Kathy ABARCA  appointmetn note reports that patient was scheduled to begin group therapy at [facility]     Addendum - Dr. Ulloa spoke with [facility] to inquire if intake can be scheduled any sooner - which was not an option. She was able to schedule patient for a therapist appointment  -   Friday April 27, 2018 -11:00am - Therapist - Anca Benítez PsyD, LP  [facility] [address] [address] ph: [phone] fax: [phone]

## 2018-03-21 NOTE — DISCHARGE SUMMARY
"    ----------------------------------------------------------------------------------------------------------  [place] [facility] [place] [facility]   Discharge Summary  Hospital Day #1      Nevin Alvarado MRN# [id number]   Age: 27 year old YOB: 1990     Date of Admission:  3/19/2018  Date of Discharge:  3/21/2018  Admitting Physician:  Ev Hernandez MD  Discharge Physician:  Ev Hernandez MD         Event Leading to Hospitalization:   \"Nevin Alavrado is a 27 year old female with a history of ADD, anxiety,  MDD, borderline personality disorder, and PTSD who presented to the [place] ED following swallowing hair pins in the context of increasing depressive symptoms and worsening compulsions to self-injure.  The patient has had several  psychiatric hospitalizations for swallowing foreign bodies or insertion with and without intent to harm self, most recently 02/13/18 for ingestion of mickie pins. Per chart review she has required at least 11 EGDs, 2 EUAs, and 3 exploratory laparatomies since 2016. She has also had 4 overdoses in the past two years, most recently 4/17/17 with oxycodone. Since past hospitalization, the patient has had ER visits every 1-5 days including 4 in the past two weeks at [facility], [facility], and [facility]. The patient denied that this episode of swallowing hair pins was a suicide attempt, and she denies suicidal ideation. An endoscopy was performed and the hair pins were removed prior to admission to inpatient psychiatry. Patient reports that her chronic SIB has worsened since her last hospitalization when they DC'd Abilify and Lamictal and started Latuda. She has not had a psychiatrist or a therapist for 2 months. She also moved from assisted living to her own apartment in November.\"       See Admission note by Ev Hernandez on 3/20/18 for additional details.          Diagnoses:     MDD  Borderline Personality Disorder  PTSD  ADD  Anxiety         " Consults:   None         Hospital Course:   Psychiatric Course:  Nevin Alvarado was admitted to [facility] with attending Dr. Ev Hernandez as a voluntary patient. She was placed on suicide and self-injury precautions and had a 1:1 sitter with her for the duration of the hospitalization in an effort to prevent self-injury. She was restarted on her home medications of Pristiq, Latuda, and Melatonin. The patient remained in her room, in bed, for the majority of the hospitalization, coming out of her room briefly for meals. The day after admission, the patient was informed that she could not have straws given her risk of swallowing objects. She then requested to leave the hospital, stating that being in the hospital was not therapeutic. She agreed with the plan to be discharged on an increased Latuda dose of 40mg daily to address mood symptoms. Attempts were made to move up her psychiatry follow-up, but no earlier intake appointment was available at [facility]. The earliest DBT therapy intake appointment was on 4/27. The team strongly recommended that the patient stay with her mother who lives nearby at least until she can see the psychiatrist. She agreed to this plan. Patient did not engage in any self-injurious behaviors while hospitalized.  Nevin Alvarado was discharged to home. At the time of discharge Nevin Alvarado was determined to not be a danger to herself or others.    Patient has a long history of self-injurious behavior. She adamantly denied any suicidal ideation prior to admission and while hospitalized. Her increased self-injurious behavior with worsening depressive symptoms are consistent with her historic diagnoses of major depressive disorder and borderline personality disorder. She likely needs continued DBT therapy, social support such as increased home services, and possible medication changes to improve her depressive symptoms.     Medical Course: The patient was medically cleared  "for admission after receiving an endoscopy for removal of the mickie pins. Her home Vitamin D was resumed at admission.     Risk Assessment:  Nevin Alvarado has notable risk factors for self-harm, including single status, anxiety and previous suicide attempts. However, risk is mitigated by commitment to family, sobriety, ability to volunteer a safety plan and history of seeking help when needed. Additional steps taken to minimize risk include: improving medication regimen, discharge to mother's house, setting up outpatient therapy. Patient denied suicidal ideation prior to hospitalization and while hospitalized. She denied thoughts of self-injury prior to discharge and discussed a plan if she has recurrent urges. Therefore, based on all available evidence including the factors cited above, Nevin Alvarado does not appear to be at imminent risk for self-harm, and is appropriate for outpatient level of care.     This document serves as a transfer of care to Nevin Alvarado's outpatient providers.         Discharge Medications:     BOLDED Medications are NEW or CHANGED  Desvenlafaxine succinate 24 h tablet 100mg daily  Lurasidone 40mg daily  Vitamin D3 2000mg daily  Levonorgestrel (Mirena) IUD continuous  Melatonin 3mg qhs PRN           Psychiatric Examination:   /66  Pulse 79  Temp 97.1  F (36.2  C) (Tympanic)  Resp 17  Ht 1.575 m (5' 2\")  Wt 110.7 kg (244 lb)  LMP   SpO2 97%  BMI 44.63 kg/m2    Appearance:  awake, alert, dressed in hospital scrubs, appeared as age stated and unkempt, laying in bed with covers up to her chin  Behavior/Attitude: cooperative  Eye Contact:  good  Mood:  sad  Affect:  mood congruent, intensity is blunted, constricted mobility  Speech:  clear, coherent, normal rate, quantity, and volume  Psychomotor Behavior:  no evidence of tardive dyskinesia, dystonia, or tics. Psychomotor retardation present.  Thought Process:  logical, linear and goal " oriented  Associations:  no loose associations  Thought Content:  no evidence of suicidal ideation or homicidal ideation, denies current thoughts of self-harm. no auditory hallucinations present and no visual hallucinations present  Insight:  fair  Judgment:  fair  Oriented to:  time, person, and place  Attention Span and Concentration:  intact  Recent and Remote Memory:  intact  Language: communicates coherently in conversational context  Fund of Knowledge: appropriate  Muscle Strength and Tone: normal  Gait and Station: Normal         Discharge Plan:   Health Care Follow-up Appointments:     2018 10:00am  with Dr. Rosio De La Rosa  [facility] - [address] [place] [address] ph: [phone] fax: [phone]    2018 11:00am  Therapist - Anca Benítez PsyD, LP  [facility] [address] [place] [address] ph: [phone] fax: [phone]    Patient seen and discussed with my attending, Ev Hernandez MD.  Flower Ulloa MD  PGY-1 Resident    Attestation:   The patient has been seen and evaluated by me, Ev Hernandez. I have examined the patient today and reviewed the discharge plan with the resident. I agree with the final assessment and plan, as noted in the discharge summary. I have reviewed today's vital signs, medications, labs and imaging.   Although the presentation/diagnosis at the time of admission led to an expectation that hospitalization would span >2 midnights, discharge is reasonable at this time given the following factors: resolution of urges to swallow, lack of suicidal ideation for significant length of time, appropriate follow-up in place, patient requesting discharge without imminent safety risk.     Total time discharge plannin minutes  Ev Hernandez MD           Labs:     Results for orders placed or performed  during the hospital encounter of 03/19/18   UPPER GI ENDOSCOPY   Result Value Ref Range    Upper GI Endoscopy       __________________________________________________________   Procedure:           Upper GI endoscopy  Indications:         Foreign body in the stomach                      Findings:       The examined esophagus was normal.       Four chris y hair pins (blunt objects) were found in the antrum impacted        within prepyloric region of the stomach. Removal was accomplished        successfully with a snare retrieval using an endoscopic simons protector.       A few localized diminutive erosions were found in the gastric fundus, in        the gastric body, and in the gastric antrum. Lesions most likely related        to localized mucosal trauma from the ingested hair pins.       A single 3 mm mucosal nodule was found in the cardia, just distal to the        GE junction, with a focal superficial ulceration. Suspect recent trauma        from hair pin ingestion with impaction at the GE junction prior to        spontaneous passage into the stomach.       No other significant abnormalities were identified in a careful        examination of the stomach.       The examined duodenum was normal. No additional retained objects were        noted at this time through the distal duodenum.                                                                                    Impression:          - Normal esophagus.                       - Four mickie hair pins (blunt objects) were found in the                        antrum impacted within prepyloric region of the stomach.                        Removal was accomplished successfully with a snare                        retrieval using an endoscopic simons protector.                       - A few localized diminutive erosions were found in the                        gastric fundus, in the gastric body, and in the gastric                        antrum. Lesions most likely related to  localized mucosal                        trauma from the ingested hair pins.                       - A single 3 mm mucosal nodule was found in the cardia,                        just distal to the GE junction, with a focal superficial                        ulceration. Suspect recent trauma from hair pin                        ingestion with impaction at the GE junction prior to                        s pontaneous passage into the stomach.                       - Stomach otherwise normal.                       - Normal examined duodenum.                       - No evidence of major mucosal trauma on second-look                        endoscopy following retrieval, no additional retained                        objects identified on this exam through the distal                        duodenum.  Recommendation:      - Return patient to PACU, recommend STAT AXR to confirm                        successful retrieval of the 4 identified objects on                        prior imaging.                       - Strongly recommend 1:1 sitter at all times while                        admitted and under medical care, awaiting transfer back                        to [place] ED for inpatient Psychiatry admission. Very                        high-risk for re-ingestion/insertion given extensive                        documented prior history at multiple medical centers                        over t he last 1-2 years per chart review. PACU and ED                        counseled accordingly.                       - If post-ingestion abdominal or esophageal/swallowing                        discomfort, consider GI cocktail accordingly.                       - The findings and recommendations were discussed with                        the patient following the procedure.                       - The findings and recommendations were discussed with                        the referring physician.   Abdomen XR, 2 vw, flat and upright             Impression    IMPRESSION: Elongated metallic radiopaque foreign body is identified  at the upper mid abdomen and extending to the left upper abdomen. The  length of this structure is 11.2 cm. There is no visible free air  identified. No bowel obstruction is seen. The position of this  approximates the expected location of the stomach. IUD incidentally  seen at the pelvis.   XR Abdomen Port 1 View                IMPRESSION: Interval removal of the previously seen radiodense foreign  body. No other radiodense foreign bodies visualized.   Drug abuse screen 6 urine (chem dep)   Result Value Ref Range    Amphetamine Qual Urine Negative NEG^Negative    Barbiturates Qual Urine Negative NEG^Negative    Benzodiazepine Qual Urine Positive (A) NEG^Negative    Cannabinoids Qual Urine Negative NEG^Negative    Cocaine Qual Urine Negative NEG^Negative    Ethanol Qual Urine Negative NEG^Negative    Opiates Qualitative Urine Negative NEG^Negative   HCG qualitative urine (UPT)   Result Value Ref Range    HCG Qual Urine Negative NEG^Negative   CBC with platelets differential   Result Value Ref Range    WBC 11.1 (H) 4.0 - 11.0 10e9/L    RBC Count 4.06 3.8 - 5.2 10e12/L    Hemoglobin 11.4 (L) 11.7 - 15.7 g/dL    Hematocrit 35.4 35.0 - 47.0 %    MCV 87 78 - 100 fl    MCH 28.1 26.5 - 33.0 pg    MCHC 32.2 31.5 - 36.5 g/dL    RDW 14.0 10.0 - 15.0 %    Platelet Count 247 150 - 450 10e9/L    Diff Method Automated Method     % Neutrophils 77.1 %    % Lymphocytes 15.3 %    % Monocytes 5.9 %    % Eosinophils 1.3 %    % Basophils 0.2 %    % Immature Granulocytes 0.2 %    Nucleated RBCs 0 0 /100    Absolute Neutrophil 8.6 (H) 1.6 - 8.3 10e9/L    Absolute Lymphocytes 1.7 0.8 - 5.3 10e9/L    Absolute Monocytes 0.7 0.0 - 1.3 10e9/L    Absolute Eosinophils 0.1 0.0 - 0.7 10e9/L    Absolute Basophils 0.0 0.0 - 0.2 10e9/L    Abs Immature Granulocytes 0.0 0 - 0.4 10e9/L    Absolute Nucleated RBC 0.0    Basic metabolic panel   Result Value Ref  Range    Sodium 141 133 - 144 mmol/L    Potassium 4.0 3.4 - 5.3 mmol/L    Chloride 107 94 - 109 mmol/L    Carbon Dioxide 26 20 - 32 mmol/L    Anion Gap 8 3 - 14 mmol/L    Glucose 88 70 - 99 mg/dL    Urea Nitrogen 9 7 - 30 mg/dL    Creatinine 0.50 (L) 0.52 - 1.04 mg/dL    GFR Estimate >90 >60 mL/min/1.7m2    GFR Estimate If Black >90 >60 mL/min/1.7m2    Calcium 8.8 8.5 - 10.1 mg/dL   INR   Result Value Ref Range    INR 1.03 0.86 - 1.14   Hepatic panel   Result Value Ref Range    Bilirubin Direct <0.1 0.0 - 0.2 mg/dL    Bilirubin Total 0.2 0.2 - 1.3 mg/dL    Albumin 3.0 (L) 3.4 - 5.0 g/dL    Protein Total 6.4 (L) 6.8 - 8.8 g/dL    Alkaline Phosphatase 75 40 - 150 U/L    ALT 16 0 - 50 U/L    AST 5 0 - 45 U/L   Lipid panel   Result Value Ref Range    Cholesterol 132 <200 mg/dL    Triglycerides 125 <150 mg/dL    HDL Cholesterol 48 (L) >49 mg/dL    LDL Cholesterol Calculated 59 <100 mg/dL    Non HDL Cholesterol 84 <130 mg/dL   Folate   Result Value Ref Range    Folate 18.7 >5.4 ng/mL   Hematocrit   Result Value Ref Range    Hematocrit 33.7 (L) 35.0 - 47.0 %   Vitamin B12   Result Value Ref Range    Vitamin B12 279 193 - 986 pg/mL   TSH with free T4 reflex and/or T3 as indicated   Result Value Ref Range    TSH 0.66 0.40 - 4.00 mU/L   Vitamin D   Result Value Ref Range    Vitamin D Deficiency screening 39 20 - 75 ug/L

## 2018-03-21 NOTE — PROGRESS NOTES
"Admission Note:    S. 28 yo voluntary admission to  22. Reason for admission: swallowed 4 mickie pins.      B. Patient has history of MDD, anxiety, PTSD, and borderline personality disorder. Patient reported history of overdoses \"too many to count\". SIB with cutting, last cut 2 yrs ago, when she started swallowing mickie pins. Patient is on disability, lives alone in a 2 bedroom apartment. Enjoys spending time with her 7 yo nephew.      A. Patient denied suicidal ideations or wishing to be dead now. She contracted for safety on the unit. Patient denied hallucinations or delusions. She stated she feels tired and sleepy. She was cooperative with the admission questions, admission is completed.     R. Patient placed on SIO for safety at this time.   "

## 2018-03-21 NOTE — PROGRESS NOTES
Isolative to room most of shift sleeping. Out for meals only. Presents sad, depressed.        03/21/18 1400   Behavioral Health   Hallucinations denies / not responding to hallucinations   Orientation person: oriented;place: oriented;date: oriented   Memory baseline memory   Insight poor   Judgement impaired   Affect sad   Mood depressed   Physical Appearance/Attire attire appropriate to age and situation   Hygiene other (see comment)  (adequate)   1. Wish to be Dead No   2. Non-Specific Active Suicidal Thoughts  No   Activity other (see comment);withdrawn;isolative  (fatigued)   Speech clear;coherent   Psychomotor / Gait balanced;steady   Psycho Education   Type of Intervention structured groups   Response refuses   Activities of Daily Living   Hygiene/Grooming independent   Oral Hygiene independent   Dress independent;scrubs (behavioral health)   Laundry with supervision   Room Organization independent

## 2018-03-22 ENCOUNTER — TELEPHONE (OUTPATIENT)
Dept: INTERNAL MEDICINE | Facility: CLINIC | Age: 28
End: 2018-03-22

## 2018-03-22 ENCOUNTER — PATIENT OUTREACH (OUTPATIENT)
Dept: CARE COORDINATION | Facility: CLINIC | Age: 28
End: 2018-03-22

## 2018-04-16 ENCOUNTER — ANESTHESIA (OUTPATIENT)
Dept: SURGERY | Facility: CLINIC | Age: 28
End: 2018-04-16
Payer: MEDICARE

## 2018-04-16 ENCOUNTER — SURGERY (OUTPATIENT)
Age: 28
End: 2018-04-16

## 2018-04-16 ENCOUNTER — HOSPITAL ENCOUNTER (OUTPATIENT)
Facility: CLINIC | Age: 28
Setting detail: OBSERVATION
Discharge: HOME | End: 2018-04-17
Attending: INTERNAL MEDICINE | Admitting: EMERGENCY MEDICINE
Payer: MEDICARE

## 2018-04-16 ENCOUNTER — APPOINTMENT (OUTPATIENT)
Dept: GENERAL RADIOLOGY | Facility: CLINIC | Age: 28
End: 2018-04-16
Attending: INTERNAL MEDICINE
Payer: MEDICARE

## 2018-04-16 ENCOUNTER — ANESTHESIA EVENT (OUTPATIENT)
Dept: SURGERY | Facility: CLINIC | Age: 28
End: 2018-04-16
Payer: MEDICARE

## 2018-04-16 DIAGNOSIS — F60.3 BORDERLINE PERSONALITY DISORDER (H): ICD-10-CM

## 2018-04-16 DIAGNOSIS — F33.2 SEVERE EPISODE OF RECURRENT MAJOR DEPRESSIVE DISORDER, WITHOUT PSYCHOTIC FEATURES (H): ICD-10-CM

## 2018-04-16 DIAGNOSIS — T18.2XXA FOREIGN BODY IN STOMACH, INITIAL ENCOUNTER: ICD-10-CM

## 2018-04-16 LAB
ALBUMIN SERPL-MCNC: 3.8 G/DL (ref 3.4–5)
ALP SERPL-CCNC: 89 U/L (ref 40–150)
ALT SERPL W P-5'-P-CCNC: 27 U/L (ref 0–50)
ANION GAP SERPL CALCULATED.3IONS-SCNC: 10 MMOL/L (ref 3–14)
APAP SERPL-MCNC: <2 MG/L (ref 10–20)
AST SERPL W P-5'-P-CCNC: 11 U/L (ref 0–45)
BASOPHILS # BLD AUTO: 0 10E9/L (ref 0–0.2)
BASOPHILS NFR BLD AUTO: 0.2 %
BILIRUB SERPL-MCNC: 0.2 MG/DL (ref 0.2–1.3)
BUN SERPL-MCNC: 10 MG/DL (ref 7–30)
CALCIUM SERPL-MCNC: 9.6 MG/DL (ref 8.5–10.1)
CHLORIDE SERPL-SCNC: 106 MMOL/L (ref 94–109)
CO2 SERPL-SCNC: 24 MMOL/L (ref 20–32)
CREAT SERPL-MCNC: 0.58 MG/DL (ref 0.52–1.04)
DIFFERENTIAL METHOD BLD: NORMAL
EOSINOPHIL # BLD AUTO: 0.2 10E9/L (ref 0–0.7)
EOSINOPHIL NFR BLD AUTO: 1.8 %
ERYTHROCYTE [DISTWIDTH] IN BLOOD BY AUTOMATED COUNT: 14.5 % (ref 10–15)
ETHANOL SERPL-MCNC: <0.01 G/DL
GFR SERPL CREATININE-BSD FRML MDRD: >90 ML/MIN/1.7M2
GLUCOSE SERPL-MCNC: 100 MG/DL (ref 70–99)
HCT VFR BLD AUTO: 39.3 % (ref 35–47)
HGB BLD-MCNC: 12.8 G/DL (ref 11.7–15.7)
IMM GRANULOCYTES # BLD: 0 10E9/L (ref 0–0.4)
IMM GRANULOCYTES NFR BLD: 0.2 %
LYMPHOCYTES # BLD AUTO: 2 10E9/L (ref 0.8–5.3)
LYMPHOCYTES NFR BLD AUTO: 20.2 %
MCH RBC QN AUTO: 28.6 PG (ref 26.5–33)
MCHC RBC AUTO-ENTMCNC: 32.6 G/DL (ref 31.5–36.5)
MCV RBC AUTO: 88 FL (ref 78–100)
MONOCYTES # BLD AUTO: 0.4 10E9/L (ref 0–1.3)
MONOCYTES NFR BLD AUTO: 4.2 %
NEUTROPHILS # BLD AUTO: 7.1 10E9/L (ref 1.6–8.3)
NEUTROPHILS NFR BLD AUTO: 73.4 %
NRBC # BLD AUTO: 0 10*3/UL
NRBC BLD AUTO-RTO: 0 /100
PLATELET # BLD AUTO: 245 10E9/L (ref 150–450)
POTASSIUM SERPL-SCNC: 4.1 MMOL/L (ref 3.4–5.3)
PROT SERPL-MCNC: 7.3 G/DL (ref 6.8–8.8)
RBC # BLD AUTO: 4.47 10E12/L (ref 3.8–5.2)
SALICYLATES SERPL-MCNC: <2 MG/DL
SODIUM SERPL-SCNC: 140 MMOL/L (ref 133–144)
WBC # BLD AUTO: 9.7 10E9/L (ref 4–11)

## 2018-04-16 PROCEDURE — 85025 COMPLETE CBC W/AUTO DIFF WBC: CPT | Performed by: INTERNAL MEDICINE

## 2018-04-16 PROCEDURE — 99285 EMERGENCY DEPT VISIT HI MDM: CPT | Mod: 25 | Performed by: INTERNAL MEDICINE

## 2018-04-16 PROCEDURE — 25000566 ZZH SEVOFLURANE, EA 15 MIN: Performed by: INTERNAL MEDICINE

## 2018-04-16 PROCEDURE — 80329 ANALGESICS NON-OPIOID 1 OR 2: CPT | Performed by: INTERNAL MEDICINE

## 2018-04-16 PROCEDURE — 36000053 ZZH SURGERY LEVEL 2 EA 15 ADDTL MIN - UMMC: Performed by: INTERNAL MEDICINE

## 2018-04-16 PROCEDURE — 80053 COMPREHEN METABOLIC PANEL: CPT | Performed by: INTERNAL MEDICINE

## 2018-04-16 PROCEDURE — 80320 DRUG SCREEN QUANTALCOHOLS: CPT | Performed by: INTERNAL MEDICINE

## 2018-04-16 PROCEDURE — 37000008 ZZH ANESTHESIA TECHNICAL FEE, 1ST 30 MIN: Performed by: INTERNAL MEDICINE

## 2018-04-16 PROCEDURE — 25000125 ZZHC RX 250: Performed by: INTERNAL MEDICINE

## 2018-04-16 PROCEDURE — 74019 RADEX ABDOMEN 2 VIEWS: CPT

## 2018-04-16 PROCEDURE — 99220 ZZC INITIAL OBSERVATION CARE,LEVL III: CPT | Mod: Z6 | Performed by: EMERGENCY MEDICINE

## 2018-04-16 PROCEDURE — 71046 X-RAY EXAM CHEST 2 VIEWS: CPT

## 2018-04-16 PROCEDURE — 25000128 H RX IP 250 OP 636: Performed by: ANESTHESIOLOGY

## 2018-04-16 PROCEDURE — 71000014 ZZH RECOVERY PHASE 1 LEVEL 2 FIRST HR: Performed by: INTERNAL MEDICINE

## 2018-04-16 PROCEDURE — 27210794 ZZH OR GENERAL SUPPLY STERILE: Performed by: INTERNAL MEDICINE

## 2018-04-16 PROCEDURE — 36000051 ZZH SURGERY LEVEL 2 1ST 30 MIN - UMMC: Performed by: INTERNAL MEDICINE

## 2018-04-16 PROCEDURE — 37000009 ZZH ANESTHESIA TECHNICAL FEE, EACH ADDTL 15 MIN: Performed by: INTERNAL MEDICINE

## 2018-04-16 PROCEDURE — A9270 NON-COVERED ITEM OR SERVICE: HCPCS | Performed by: INTERNAL MEDICINE

## 2018-04-16 RX ORDER — CLONIDINE HYDROCHLORIDE 0.1 MG/1
0.1 TABLET ORAL 2 TIMES DAILY
COMMUNITY
End: 2022-02-09

## 2018-04-16 RX ADMIN — SIMETHICONE 4 ML: 63.3; 3.7 SOLUTION/ DROPS ORAL at 23:26

## 2018-04-16 RX ADMIN — SODIUM CHLORIDE, POTASSIUM CHLORIDE, SODIUM LACTATE AND CALCIUM CHLORIDE: 600; 310; 30; 20 INJECTION, SOLUTION INTRAVENOUS at 23:55

## 2018-04-16 ASSESSMENT — ENCOUNTER SYMPTOMS
SORE THROAT: 0
CHILLS: 0
FEVER: 0
ADENOPATHY: 0
ABDOMINAL PAIN: 1
WEAKNESS: 0
HEADACHES: 0
WHEEZING: 0
NUMBNESS: 0
NAUSEA: 0
COUGH: 0
DIFFICULTY URINATING: 0
VOMITING: 0
TROUBLE SWALLOWING: 0
SHORTNESS OF BREATH: 0
CONFUSION: 0
NECK PAIN: 0

## 2018-04-16 NOTE — IP AVS SNAPSHOT
MRN:[id number]                      After Visit Summary   4/16/2018    Nevin Alvarado    MRN: [id number]           Thank you!     Thank you for choosing [facility] for your care. Our goal is always to provide you with excellent care. Hearing back from our patients is one way we can continue to improve our services. Please take a few minutes to complete the written survey that you may receive in the mail after you visit with us. Thank you!        Patient Information     Date Of Birth          1990        About your hospital stay     You were admitted on:  April 17, 2018 You last received care in the:  [facility]    You were discharged on:  April 17, 2018        Reason for your hospital stay       Foreign body ingestion                  Who to Call     For medical emergencies, please call 911.  For non-urgent questions about your medical care, please call your primary care provider or clinic, [phone]  For questions related to your surgery, please call your surgery clinic        Attending Provider     Provider Specialty    Gustavo Quintero MD Emergency Medicine    [facility], Oliva Dupont MD Emergency Medicine       Primary Care Provider Office Phone # Fax #    Latonya Knight [phone]       When to contact your care team       Return to the ER if you have severe abdominal pain, vomiting, blood in vomit, shortness of breath                  After Care Instructions     Activity       Your activity upon discharge: activity as tolerated            Diet       Follow this diet upon discharge: Orders Placed This Encounter      Advance Diet as Tolerated: Regular Diet Adult                  Follow-up Appointments     Adult [facility] Follow-up and recommended labs and tests       Follow up with primary care provider, Latonya Knight, within 7 days for hospital follow- up.     Appointments on [place] and/or [facility] (with [facility] or [facility]  "provider or service). Call [phone] if you haven't heard regarding these appointments within 7 days of discharge.                  Pending Results     Date and Time Order Name Status Description    4/17/2018 0039 Surgical pathology exam In process             Statement of Approval     Ordered          04/17/18 1221  I have reviewed and agree with all the recommendations and orders detailed in this document.  EFFECTIVE NOW     Approved and electronically signed by:  Liza Gold APRN CNP             Admission Information     Date & Time Provider Department Dept. Phone    4/16/2018 Oliva Cabello MD [facility] [phone]      Your Vitals Were     Blood Pressure Pulse Temperature Respirations Height Weight    113/58 (BP Location: Right arm) 52 98.2  F (36.8  C) (Oral) 18 1.575 m (5' 2\") 111 kg (244 lb 11.4 oz)    Pulse Oximetry BMI (Body Mass Index)                96% 44.76 kg/m2          [employer] Information     [employer] gives you secure access to your electronic health record. If you see a primary care provider, you can also send messages to your care team and make appointments. If you have questions, please call your primary care clinic.  If you do not have a primary care provider, please call [phone] and they will assist you.        Care EveryWhere ID     This is your Care EveryWhere ID. This could be used by other organizations to access your [place] medical records  [id number]        Equal Access to Services     ZENON DIAMOND : Hadii zaire andino Sogwendolyn, waaxda luqadaha, qaybta kaalmada jona, mic angelo . So [facility] [phone].    ATENCIÓN: Si habla español, tiene a singh disposición servicios gratuitos de asistencia lingüística. Llame al [phone].    We comply with applicable federal civil rights laws and [place] laws. We do not discriminate on the basis of race, color, national origin, age, disability, sex, sexual " orientation, or gender identity.               Review of your medicines      CONTINUE these medicines which may have CHANGED, or have new prescriptions. If we are uncertain of the size of tablets/capsules you have at home, strength may be listed as something that might have changed.        Dose / Directions    lurasidone 20 MG Tabs tablet   Commonly known as:  LATUDA   This may have changed:  when to take this   Used for:  Borderline personality disorder, Severe episode of recurrent major depressive disorder, without psychotic features (H)        Dose:  40 mg   Take 2 tablets (40 mg) by mouth every evening   Quantity:  12 tablet   Refills:  0         CONTINUE these medicines which have NOT CHANGED        Dose / Directions    CLONIDINE HCL PO        Dose:  0.1 mg   Take 0.1 mg by mouth 2 times daily   Refills:  0       cyclobenzaprine 10 MG tablet   Commonly known as:  FLEXERIL        Dose:  10 mg   Take 10 mg by mouth 3 times daily as needed   Refills:  0       levonorgestrel 20 MCG/24HR IUD   Commonly known as:  MIRENA        Dose:  1 each   1 each (20 mcg) by Intrauterine route once for 1 dose   Refills:  0       MELATONIN PO        Dose:  3 mg   Take 3 mg by mouth nightly as needed (sleep)   Refills:  0       ondansetron 4 MG ODT tab   Commonly known as:  ZOFRAN-ODT        Dose:  4 mg   Take 4 mg by mouth every 6 hours as needed   Refills:  0       PRISTIQ PO        Dose:  100 mg   Take 100 mg by mouth every morning   Refills:  0       VITAMIN D3 PO        Dose:  2000 Units   Take 2,000 Units by mouth every morning   Refills:  0            Where to get your medicines      Some of these will need a paper prescription and others can be bought over the counter. Ask your nurse if you have questions.     You don't need a prescription for these medications     lurasidone 20 MG Tabs tablet                Protect others around you: Learn how to safely use, store and throw away your medicines at www.disposemymeds.org.              Medication List: This is a list of all your medications and when to take them. Check marks below indicate your daily home schedule. Keep this list as a reference.      Medications           Morning Afternoon Evening Bedtime As Needed    CLONIDINE HCL PO   Take 0.1 mg by mouth 2 times daily   Last time this was given:  0.1 mg on 4/17/2018  8:12 AM                                cyclobenzaprine 10 MG tablet   Commonly known as:  FLEXERIL   Take 10 mg by mouth 3 times daily as needed                                levonorgestrel 20 MCG/24HR IUD   Commonly known as:  MIRENA   1 each (20 mcg) by Intrauterine route once for 1 dose                                lurasidone 20 MG Tabs tablet   Commonly known as:  LATUDA   Take 2 tablets (40 mg) by mouth every evening   Last time this was given:  40 mg on 4/17/2018  8:12 AM                                MELATONIN PO   Take 3 mg by mouth nightly as needed (sleep)                                ondansetron 4 MG ODT tab   Commonly known as:  ZOFRAN-ODT   Take 4 mg by mouth every 6 hours as needed                                PRISTIQ PO   Take 100 mg by mouth every morning   Last time this was given:  100 mg on 4/17/2018  8:12 AM                                VITAMIN D3 PO   Take 2,000 Units by mouth every morning

## 2018-04-16 NOTE — IP AVS SNAPSHOT
[address] Observation [address] [address]    Phone:  [phone]    Fax:  [phone]                                       After Visit Summary   4/16/2018    Nevin Alvarado    MRN: [id number]           After Visit Summary Signature Page     I have received my discharge instructions, and my questions have been answered. I have discussed any challenges I see with this plan with the nurse or doctor.    ..........................................................................................................................................  Patient/Patient Representative Signature      ..........................................................................................................................................  Patient Representative Print Name and Relationship to Patient    ..................................................               ................................................  Date                                            Time    ..........................................................................................................................................  Reviewed by Signature/Title    ...................................................              ..............................................  Date                                                            Time

## 2018-04-17 VITALS
WEIGHT: 244.71 LBS | DIASTOLIC BLOOD PRESSURE: 58 MMHG | SYSTOLIC BLOOD PRESSURE: 113 MMHG | BODY MASS INDEX: 45.03 KG/M2 | RESPIRATION RATE: 18 BRPM | OXYGEN SATURATION: 96 % | TEMPERATURE: 98.2 F | HEIGHT: 62 IN | HEART RATE: 52 BPM

## 2018-04-17 PROBLEM — T18.9XXA FOREIGN BODY INGESTION: Status: ACTIVE | Noted: 2017-12-22

## 2018-04-17 LAB — UPPER GI ENDOSCOPY: NORMAL

## 2018-04-17 PROCEDURE — 96374 THER/PROPH/DIAG INJ IV PUSH: CPT

## 2018-04-17 PROCEDURE — 25000125 ZZHC RX 250: Performed by: ANESTHESIOLOGY

## 2018-04-17 PROCEDURE — 25000128 H RX IP 250 OP 636: Performed by: ANESTHESIOLOGY

## 2018-04-17 PROCEDURE — G0378 HOSPITAL OBSERVATION PER HR: HCPCS

## 2018-04-17 PROCEDURE — 96375 TX/PRO/DX INJ NEW DRUG ADDON: CPT

## 2018-04-17 PROCEDURE — 99214 OFFICE O/P EST MOD 30 MIN: CPT | Performed by: PSYCHIATRY & NEUROLOGY

## 2018-04-17 PROCEDURE — 25000128 H RX IP 250 OP 636: Performed by: PHYSICIAN ASSISTANT

## 2018-04-17 PROCEDURE — 25000128 H RX IP 250 OP 636

## 2018-04-17 PROCEDURE — 25000125 ZZHC RX 250: Performed by: PHYSICIAN ASSISTANT

## 2018-04-17 PROCEDURE — 88300 SURGICAL PATH GROSS: CPT | Performed by: INTERNAL MEDICINE

## 2018-04-17 PROCEDURE — A9270 NON-COVERED ITEM OR SERVICE: HCPCS | Mod: GY | Performed by: PHYSICIAN ASSISTANT

## 2018-04-17 PROCEDURE — 96376 TX/PRO/DX INJ SAME DRUG ADON: CPT

## 2018-04-17 PROCEDURE — 99217 ZZC OBSERVATION CARE DISCHARGE: CPT | Mod: Z6 | Performed by: EMERGENCY MEDICINE

## 2018-04-17 PROCEDURE — 25000132 ZZH RX MED GY IP 250 OP 250 PS 637: Mod: GY | Performed by: PHYSICIAN ASSISTANT

## 2018-04-17 RX ORDER — CLONIDINE HYDROCHLORIDE 0.1 MG/1
0.1 TABLET ORAL 2 TIMES DAILY
Status: DISCONTINUED | OUTPATIENT
Start: 2018-04-17 | End: 2018-04-17 | Stop reason: HOSPADM

## 2018-04-17 RX ORDER — ONDANSETRON 4 MG/1
4 TABLET, ORALLY DISINTEGRATING ORAL EVERY 6 HOURS PRN
Status: DISCONTINUED | OUTPATIENT
Start: 2018-04-17 | End: 2018-04-17 | Stop reason: HOSPADM

## 2018-04-17 RX ORDER — PROPOFOL 10 MG/ML
INJECTION, EMULSION INTRAVENOUS PRN
Status: DISCONTINUED | OUTPATIENT
Start: 2018-04-17 | End: 2018-04-17

## 2018-04-17 RX ORDER — DIPHENHYDRAMINE HYDROCHLORIDE 50 MG/ML
25 INJECTION INTRAMUSCULAR; INTRAVENOUS ONCE
Status: COMPLETED | OUTPATIENT
Start: 2018-04-17 | End: 2018-04-17

## 2018-04-17 RX ORDER — LIDOCAINE 40 MG/G
CREAM TOPICAL
Status: DISCONTINUED | OUTPATIENT
Start: 2018-04-17 | End: 2018-04-17 | Stop reason: HOSPADM

## 2018-04-17 RX ORDER — DIPHENHYDRAMINE HCL 25 MG
50 CAPSULE ORAL EVERY 6 HOURS PRN
Status: DISCONTINUED | OUTPATIENT
Start: 2018-04-17 | End: 2018-04-17 | Stop reason: HOSPADM

## 2018-04-17 RX ORDER — DIPHENHYDRAMINE HYDROCHLORIDE 50 MG/ML
INJECTION INTRAMUSCULAR; INTRAVENOUS
Status: COMPLETED
Start: 2018-04-17 | End: 2018-04-17

## 2018-04-17 RX ORDER — NALOXONE HYDROCHLORIDE 0.4 MG/ML
.1-.4 INJECTION, SOLUTION INTRAMUSCULAR; INTRAVENOUS; SUBCUTANEOUS
Status: DISCONTINUED | OUTPATIENT
Start: 2018-04-17 | End: 2018-04-17 | Stop reason: HOSPADM

## 2018-04-17 RX ORDER — PROMETHAZINE HYDROCHLORIDE 25 MG/ML
12.5 INJECTION, SOLUTION INTRAMUSCULAR; INTRAVENOUS
Status: COMPLETED | OUTPATIENT
Start: 2018-04-17 | End: 2018-04-17

## 2018-04-17 RX ORDER — SODIUM CHLORIDE, SODIUM LACTATE, POTASSIUM CHLORIDE, CALCIUM CHLORIDE 600; 310; 30; 20 MG/100ML; MG/100ML; MG/100ML; MG/100ML
INJECTION, SOLUTION INTRAVENOUS CONTINUOUS PRN
Status: DISCONTINUED | OUTPATIENT
Start: 2018-04-16 | End: 2018-04-17

## 2018-04-17 RX ORDER — ONDANSETRON 2 MG/ML
4 INJECTION INTRAMUSCULAR; INTRAVENOUS EVERY 6 HOURS PRN
Status: DISCONTINUED | OUTPATIENT
Start: 2018-04-17 | End: 2018-04-17 | Stop reason: HOSPADM

## 2018-04-17 RX ORDER — HYDROXYZINE HYDROCHLORIDE 25 MG/1
50 TABLET, FILM COATED ORAL EVERY 6 HOURS PRN
Status: DISCONTINUED | OUTPATIENT
Start: 2018-04-17 | End: 2018-04-17 | Stop reason: HOSPADM

## 2018-04-17 RX ORDER — ONDANSETRON 4 MG/1
4 TABLET, ORALLY DISINTEGRATING ORAL EVERY 6 HOURS PRN
Status: ON HOLD | COMMUNITY
End: 2018-07-19

## 2018-04-17 RX ORDER — DEXAMETHASONE SODIUM PHOSPHATE 4 MG/ML
INJECTION, SOLUTION INTRA-ARTICULAR; INTRALESIONAL; INTRAMUSCULAR; INTRAVENOUS; SOFT TISSUE PRN
Status: DISCONTINUED | OUTPATIENT
Start: 2018-04-17 | End: 2018-04-17

## 2018-04-17 RX ORDER — DIPHENHYDRAMINE HYDROCHLORIDE 50 MG/ML
25 INJECTION INTRAMUSCULAR; INTRAVENOUS ONCE
Status: DISCONTINUED | OUTPATIENT
Start: 2018-04-17 | End: 2018-04-17

## 2018-04-17 RX ORDER — FENTANYL CITRATE 50 UG/ML
INJECTION, SOLUTION INTRAMUSCULAR; INTRAVENOUS PRN
Status: DISCONTINUED | OUTPATIENT
Start: 2018-04-17 | End: 2018-04-17

## 2018-04-17 RX ORDER — ONDANSETRON 2 MG/ML
4 INJECTION INTRAMUSCULAR; INTRAVENOUS EVERY 30 MIN PRN
Status: DISCONTINUED | OUTPATIENT
Start: 2018-04-17 | End: 2018-04-17 | Stop reason: HOSPADM

## 2018-04-17 RX ORDER — CYCLOBENZAPRINE HCL 10 MG
10 TABLET ORAL 3 TIMES DAILY PRN
COMMUNITY
Start: 2018-03-07 | End: 2018-07-06

## 2018-04-17 RX ORDER — NALOXONE HYDROCHLORIDE 0.4 MG/ML
.1-.4 INJECTION, SOLUTION INTRAMUSCULAR; INTRAVENOUS; SUBCUTANEOUS
Status: DISCONTINUED | OUTPATIENT
Start: 2018-04-17 | End: 2018-04-17

## 2018-04-17 RX ORDER — SODIUM CHLORIDE 9 MG/ML
INJECTION, SOLUTION INTRAVENOUS CONTINUOUS
Status: DISCONTINUED | OUTPATIENT
Start: 2018-04-17 | End: 2018-04-17 | Stop reason: HOSPADM

## 2018-04-17 RX ORDER — LANOLIN ALCOHOL/MO/W.PET/CERES
3 CREAM (GRAM) TOPICAL
Status: DISCONTINUED | OUTPATIENT
Start: 2018-04-17 | End: 2018-04-17 | Stop reason: HOSPADM

## 2018-04-17 RX ORDER — PROPOFOL 10 MG/ML
INJECTION, EMULSION INTRAVENOUS CONTINUOUS PRN
Status: DISCONTINUED | OUTPATIENT
Start: 2018-04-17 | End: 2018-04-17

## 2018-04-17 RX ORDER — ONDANSETRON 4 MG/1
4 TABLET, ORALLY DISINTEGRATING ORAL EVERY 30 MIN PRN
Status: DISCONTINUED | OUTPATIENT
Start: 2018-04-17 | End: 2018-04-17 | Stop reason: HOSPADM

## 2018-04-17 RX ORDER — SODIUM CHLORIDE, SODIUM LACTATE, POTASSIUM CHLORIDE, CALCIUM CHLORIDE 600; 310; 30; 20 MG/100ML; MG/100ML; MG/100ML; MG/100ML
INJECTION, SOLUTION INTRAVENOUS CONTINUOUS
Status: DISCONTINUED | OUTPATIENT
Start: 2018-04-17 | End: 2018-04-17 | Stop reason: HOSPADM

## 2018-04-17 RX ORDER — LURASIDONE HYDROCHLORIDE 40 MG/1
40 TABLET, FILM COATED ORAL EVERY MORNING
Status: DISCONTINUED | OUTPATIENT
Start: 2018-04-17 | End: 2018-04-17 | Stop reason: HOSPADM

## 2018-04-17 RX ORDER — LIDOCAINE HYDROCHLORIDE 20 MG/ML
INJECTION, SOLUTION INFILTRATION; PERINEURAL PRN
Status: DISCONTINUED | OUTPATIENT
Start: 2018-04-17 | End: 2018-04-17

## 2018-04-17 RX ORDER — LURASIDONE HYDROCHLORIDE 20 MG/1
40 TABLET, FILM COATED ORAL EVERY EVENING
Qty: 12 TABLET | Refills: 0 | Status: ON HOLD
Start: 2018-04-17 | End: 2019-02-21

## 2018-04-17 RX ORDER — FLUMAZENIL 0.1 MG/ML
0.2 INJECTION, SOLUTION INTRAVENOUS
Status: DISCONTINUED | OUTPATIENT
Start: 2018-04-17 | End: 2018-04-17 | Stop reason: HOSPADM

## 2018-04-17 RX ORDER — ACETAMINOPHEN 500 MG
1000 TABLET ORAL EVERY 6 HOURS PRN
Status: DISCONTINUED | OUTPATIENT
Start: 2018-04-17 | End: 2018-04-17 | Stop reason: HOSPADM

## 2018-04-17 RX ORDER — FENTANYL CITRATE 50 UG/ML
25-50 INJECTION, SOLUTION INTRAMUSCULAR; INTRAVENOUS
Status: DISCONTINUED | OUTPATIENT
Start: 2018-04-17 | End: 2018-04-17 | Stop reason: HOSPADM

## 2018-04-17 RX ORDER — HYDROMORPHONE HYDROCHLORIDE 1 MG/ML
.3-.5 INJECTION, SOLUTION INTRAMUSCULAR; INTRAVENOUS; SUBCUTANEOUS EVERY 5 MIN PRN
Status: DISCONTINUED | OUTPATIENT
Start: 2018-04-17 | End: 2018-04-17 | Stop reason: HOSPADM

## 2018-04-17 RX ORDER — AMOXICILLIN 250 MG
1-2 CAPSULE ORAL
Status: DISCONTINUED | OUTPATIENT
Start: 2018-04-17 | End: 2018-04-17 | Stop reason: HOSPADM

## 2018-04-17 RX ORDER — CYCLOBENZAPRINE HCL 5 MG
10 TABLET ORAL 3 TIMES DAILY PRN
Status: DISCONTINUED | OUTPATIENT
Start: 2018-04-17 | End: 2018-04-17 | Stop reason: HOSPADM

## 2018-04-17 RX ORDER — DESVENLAFAXINE 50 MG/1
100 TABLET, FILM COATED, EXTENDED RELEASE ORAL EVERY MORNING
Status: DISCONTINUED | OUTPATIENT
Start: 2018-04-17 | End: 2018-04-17 | Stop reason: HOSPADM

## 2018-04-17 RX ADMIN — HYDROMORPHONE HYDROCHLORIDE 0.5 MG: 1 INJECTION, SOLUTION INTRAMUSCULAR; INTRAVENOUS; SUBCUTANEOUS at 01:43

## 2018-04-17 RX ADMIN — DIPHENHYDRAMINE HYDROCHLORIDE 25 MG: 50 INJECTION, SOLUTION INTRAMUSCULAR; INTRAVENOUS at 03:21

## 2018-04-17 RX ADMIN — FENTANYL CITRATE 50 MCG: 50 INJECTION INTRAMUSCULAR; INTRAVENOUS at 01:13

## 2018-04-17 RX ADMIN — FENTANYL CITRATE 50 MCG: 50 INJECTION INTRAMUSCULAR; INTRAVENOUS at 01:08

## 2018-04-17 RX ADMIN — DIPHENHYDRAMINE HYDROCHLORIDE 25 MG: 50 INJECTION INTRAMUSCULAR; INTRAVENOUS at 03:21

## 2018-04-17 RX ADMIN — LIDOCAINE HYDROCHLORIDE 100 MG: 20 INJECTION, SOLUTION INFILTRATION; PERINEURAL at 00:05

## 2018-04-17 RX ADMIN — DIPHENHYDRAMINE HYDROCHLORIDE 25 MG: 50 INJECTION INTRAMUSCULAR; INTRAVENOUS at 02:35

## 2018-04-17 RX ADMIN — ONDANSETRON 4 MG: 2 INJECTION INTRAMUSCULAR; INTRAVENOUS at 01:16

## 2018-04-17 RX ADMIN — HYDROMORPHONE HYDROCHLORIDE 0.5 MG: 1 INJECTION, SOLUTION INTRAMUSCULAR; INTRAVENOUS; SUBCUTANEOUS at 01:20

## 2018-04-17 RX ADMIN — PROPOFOL 20 MCG/KG/MIN: 10 INJECTION, EMULSION INTRAVENOUS at 00:21

## 2018-04-17 RX ADMIN — HYDROMORPHONE HYDROCHLORIDE 0.5 MG: 1 INJECTION, SOLUTION INTRAMUSCULAR; INTRAVENOUS; SUBCUTANEOUS at 01:36

## 2018-04-17 RX ADMIN — DESVENLAFAXINE 100 MG: 50 TABLET, FILM COATED, EXTENDED RELEASE ORAL at 08:12

## 2018-04-17 RX ADMIN — DIPHENHYDRAMINE HYDROCHLORIDE 25 MG: 50 INJECTION, SOLUTION INTRAMUSCULAR; INTRAVENOUS at 06:45

## 2018-04-17 RX ADMIN — FENTANYL CITRATE 50 MCG: 50 INJECTION, SOLUTION INTRAMUSCULAR; INTRAVENOUS at 00:05

## 2018-04-17 RX ADMIN — DEXAMETHASONE SODIUM PHOSPHATE 6 MG: 4 INJECTION, SOLUTION INTRA-ARTICULAR; INTRALESIONAL; INTRAMUSCULAR; INTRAVENOUS; SOFT TISSUE at 00:50

## 2018-04-17 RX ADMIN — FENTANYL CITRATE 50 MCG: 50 INJECTION, SOLUTION INTRAMUSCULAR; INTRAVENOUS at 01:02

## 2018-04-17 RX ADMIN — LURASIDONE HYDROCHLORIDE 40 MG: 40 TABLET, FILM COATED ORAL at 08:12

## 2018-04-17 RX ADMIN — DEXAMETHASONE SODIUM PHOSPHATE 4 MG: 4 INJECTION, SOLUTION INTRA-ARTICULAR; INTRALESIONAL; INTRAMUSCULAR; INTRAVENOUS; SOFT TISSUE at 00:05

## 2018-04-17 RX ADMIN — PROPOFOL 50 MG: 10 INJECTION, EMULSION INTRAVENOUS at 00:10

## 2018-04-17 RX ADMIN — SODIUM CHLORIDE: 9 INJECTION, SOLUTION INTRAVENOUS at 05:59

## 2018-04-17 RX ADMIN — PROPOFOL 200 MG: 10 INJECTION, EMULSION INTRAVENOUS at 00:05

## 2018-04-17 RX ADMIN — HYDROMORPHONE HYDROCHLORIDE 0.5 MG: 1 INJECTION, SOLUTION INTRAMUSCULAR; INTRAVENOUS; SUBCUTANEOUS at 01:27

## 2018-04-17 RX ADMIN — PROMETHAZINE HYDROCHLORIDE 12.5 MG: 25 INJECTION INTRAMUSCULAR; INTRAVENOUS at 01:38

## 2018-04-17 RX ADMIN — DIPHENHYDRAMINE HYDROCHLORIDE 25 MG: 50 INJECTION, SOLUTION INTRAMUSCULAR; INTRAVENOUS at 02:35

## 2018-04-17 RX ADMIN — Medication 140 MG: at 00:05

## 2018-04-17 RX ADMIN — PANTOPRAZOLE SODIUM 40 MG: 40 INJECTION, POWDER, FOR SOLUTION INTRAVENOUS at 08:13

## 2018-04-17 RX ADMIN — CLONIDINE HYDROCHLORIDE 0.1 MG: 0.1 TABLET ORAL at 08:12

## 2018-04-17 NOTE — CONSULTS
Patient seen and chart reviewed. Note dictated (initial)   RECOMMENDATIONS:  She is OK for d/c without transfer to IP psych  Taking Latuda in a.m.; makes her drowsy, so I advised her to take after her evening meal   Can keep Pristiq and clonidine the same   She does have follow up schedule for psychotherapy and med management, as well as safety plan   page me at [phone] as needed

## 2018-04-17 NOTE — ED PROVIDER NOTES
History     Chief Complaint   Patient presents with     Swallowed Foreign Body     2 mickie pins     HPI  Nevin Alvarado is a 27 year old female who presents stating she swallowed 2 mickie pins at about 6 PM tonight. She has a history of recurrent self injurious behavior with greater than 30 incidents of swallowed metallic foreign bodies, rectal foreign bodies or overdose. She denies suicidal ideation, states this is just something she does. She states her last incident of swallowed foreign body was about 1 week ago. She denies fever, chills, swallowing difficulty, chest pain, cough, shortness of breath. She is having LUQ abdominal pain. She denies nausea or vomiting. She denies drug ingestion or insertion of rectal foreign bodies today. She states she is taking her medication. She denies suicidal ideation or intent of self harm. She currently is living independently after discharge from a group home last year. Last mental health hospitalization was about a month ago for 1 day. She is supposed to start DBT in about 1 week. She has net been following with a therapist for a few months.    PAST MEDICAL HISTORY:   Past Medical History:   Diagnosis Date     ADD (attention deficit disorder)      Anorexia nervosa with bulimia     history of; on Topamax     Anxiety      Borderline personality disorder      Depression      Depressive disorder      H/O self-harm      Lives in independent group home     due to debilitating mental illness     Migraine without aura     no known triggers; on Topamax bid and Imitrex PRN     Morbid obesity (H)      PTSD (post-traumatic stress disorder)      Rectal foreign body - Recurrent issue, self placed      Swallowed foreign body - Recurrent issue, self placed        PAST SURGICAL HISTORY:   Past Surgical History:   Procedure Laterality Date     ESOPHAGOSCOPY, GASTROSCOPY, DUODENOSCOPY (EGD), COMBINED N/A 3/9/2017    Procedure: COMBINED ESOPHAGOSCOPY, GASTROSCOPY, DUODENOSCOPY (EGD),  REMOVE FOREIGN BODY;  Surgeon: Avis Guzmán MD;  Location: [facility]     ESOPHAGOSCOPY, GASTROSCOPY, DUODENOSCOPY (EGD), COMBINED N/A 4/20/2017    Procedure: COMBINED ESOPHAGOSCOPY, GASTROSCOPY, DUODENOSCOPY (EGD), REMOVE FOREIGN BODY;  EGD removal Foregin body;  Surgeon: Lokesh Paula MD;  Location: [facility]     ESOPHAGOSCOPY, GASTROSCOPY, DUODENOSCOPY (EGD), COMBINED N/A 6/12/2017    Procedure: COMBINED ESOPHAGOSCOPY, GASTROSCOPY, DUODENOSCOPY (EGD);  COMBINED ESOPHAGOSCOPY, GASTROSCOPY, DUODENOSCOPY (EGD) [[id number]]attempted removal of foreign body;  Surgeon: Pamela Perez MD;  Location: [facility]     ESOPHAGOSCOPY, GASTROSCOPY, DUODENOSCOPY (EGD), COMBINED N/A 6/9/2017    Procedure: COMBINED ESOPHAGOSCOPY, GASTROSCOPY, DUODENOSCOPY (EGD), REMOVE FOREIGN BODY;  Esophagoscopy, Gastroscopy, Duodenoscopy, Removal of Foreign Body;  Surgeon: Dejon Marsh MD;  Location: [facility]     ESOPHAGOSCOPY, GASTROSCOPY, DUODENOSCOPY (EGD), COMBINED N/A 1/6/2018    Procedure: COMBINED ESOPHAGOSCOPY, GASTROSCOPY, DUODENOSCOPY (EGD), REMOVE FOREIGN BODY;  COMBINED ESOPHAGOSCOPY, GASTROSCOPY, DUODENOSCOPY (EGD) [by pascal net and snare with profol sedation;  Surgeon: Feliciano Emmanuel MD;  Location:  [facility]     ESOPHAGOSCOPY, GASTROSCOPY, DUODENOSCOPY (EGD), COMBINED N/A 3/19/2018    Procedure: COMBINED ESOPHAGOSCOPY, GASTROSCOPY, DUODENOSCOPY (EGD), REMOVE FOREIGN BODY;   Esophagodscopy, Gastroscopy, Duodenoscopy,Foreign Body Removal;  Surgeon: Brice Guzmán MD;  Location: [facility]     EXAM UNDER ANESTHESIA ANUS N/A 1/10/2017    Procedure: EXAM UNDER ANESTHESIA ANUS;  Surgeon: Annmarie Haynes MD;  Location: [facility]     HC REMOVE FECAL IMPACTION OR FB W ANESTHESIA N/A 12/18/2016    Procedure: REMOVE FECAL IMPACTION/FOREIGN BODY UNDER ANESTHESIA;  Surgeon: Soham Cano MD;  Location: [facility]     KNEE SURGERY - removed a small tissue mass from knee Right      LAPAROSCOPIC ABLATION  "ENDOMETRIOSIS       LAPAROTOMY EXPLORATORY N/A 1/10/2017    Procedure: LAPAROTOMY EXPLORATORY;  Surgeon: Annmarie Haynes MD;  Location: [facility] OR     lymph nodes removed from neck; benign  age 7     MAMMOPLASTY REDUCTION Bilateral      RELEASE CARPAL TUNNEL Bilateral      SIGMOIDOSCOPY FLEXIBLE N/A 1/10/2017    Procedure: SIGMOIDOSCOPY FLEXIBLE;  Surgeon: Annmarie Haynes MD;  Location: [facility]       FAMILY HISTORY:   Family History   Problem Relation Age of Onset     Type 2 Diabetes Maternal Grandmother      Type 2 Diabetes Paternal Grandmother      Breast Cancer Paternal Grandmother      CEREBROVASCULAR DISEASE Father 53     Myocardial Infarction No family hx of      Coronary Artery Disease Early Onset No family hx of      Ovarian Cancer No family hx of      Colon Cancer No family hx of        SOCIAL HISTORY:   Social History   Substance Use Topics     Smoking status: Never Smoker     Smokeless tobacco: Never Used     Alcohol use No         I have reviewed the Medications, Allergies, Past Medical and Surgical History, and Social History in the Epic system.    Review of Systems   Constitutional: Negative for chills and fever.   HENT: Negative for sore throat and trouble swallowing.    Eyes: Negative for visual disturbance.   Respiratory: Negative for cough, shortness of breath and wheezing.    Cardiovascular: Negative for chest pain.   Gastrointestinal: Positive for abdominal pain. Negative for nausea and vomiting.   Genitourinary: Negative for difficulty urinating.   Musculoskeletal: Negative for neck pain.   Skin: Negative for rash.   Neurological: Negative for weakness, numbness and headaches.   Hematological: Negative for adenopathy.   Psychiatric/Behavioral: Positive for self-injury. Negative for confusion and suicidal ideas.       Physical Exam   BP: 144/81  Pulse: 105  Heart Rate: 95  Temp: 98.5  F (36.9  C)  Resp: 18  Height: 157.5 cm (5' 2\")  Weight: 111 kg (244 lb 11.4 oz)  SpO2: 96 " %      Physical Exam   Constitutional: She is oriented to person, place, and time. She appears well-developed and well-nourished. No distress.   HENT:   Head: Normocephalic and atraumatic.   Right Ear: External ear normal.   Left Ear: External ear normal.   Nose: Nose normal.   Mouth/Throat: Oropharynx is clear and moist.   Eyes: EOM are normal. Pupils are equal, round, and reactive to light. No scleral icterus.   Neck: Normal range of motion. Neck supple. No JVD present.   Cardiovascular: Normal rate, regular rhythm and normal heart sounds.  Exam reveals no friction rub.    No murmur heard.  Pulmonary/Chest: Effort normal and breath sounds normal. She has no wheezes. She has no rales.   Abdominal: Soft. Bowel sounds are normal. There is tenderness in the left upper quadrant.   Musculoskeletal: She exhibits no edema or tenderness.   Lymphadenopathy:     She has no cervical adenopathy.   Neurological: She is alert and oriented to person, place, and time. No cranial nerve deficit. Coordination normal.   Skin: Skin is warm and dry.   Psychiatric: Her speech is normal and behavior is normal. Her affect is blunt. Cognition and memory are normal. She expresses impulsivity. She expresses no homicidal and no suicidal ideation.   Nursing note and vitals reviewed.      ED Course     ED Course     Procedures        Labs/Imaging    Results for orders placed or performed during the hospital encounter of 04/16/18 (from the past 24 hour(s))   CBC with platelets differential   Result Value Ref Range    WBC 9.7 4.0 - 11.0 10e9/L    RBC Count 4.47 3.8 - 5.2 10e12/L    Hemoglobin 12.8 11.7 - 15.7 g/dL    Hematocrit 39.3 35.0 - 47.0 %    MCV 88 78 - 100 fl    MCH 28.6 26.5 - 33.0 pg    MCHC 32.6 31.5 - 36.5 g/dL    RDW 14.5 10.0 - 15.0 %    Platelet Count 245 150 - 450 10e9/L    Diff Method Automated Method     % Neutrophils 73.4 %    % Lymphocytes 20.2 %    % Monocytes 4.2 %    % Eosinophils 1.8 %    % Basophils 0.2 %    % Immature  Granulocytes 0.2 %    Nucleated RBCs 0 0 /100    Absolute Neutrophil 7.1 1.6 - 8.3 10e9/L    Absolute Lymphocytes 2.0 0.8 - 5.3 10e9/L    Absolute Monocytes 0.4 0.0 - 1.3 10e9/L    Absolute Eosinophils 0.2 0.0 - 0.7 10e9/L    Absolute Basophils 0.0 0.0 - 0.2 10e9/L    Abs Immature Granulocytes 0.0 0 - 0.4 10e9/L    Absolute Nucleated RBC 0.0    Comprehensive metabolic panel   Result Value Ref Range    Sodium 140 133 - 144 mmol/L    Potassium 4.1 3.4 - 5.3 mmol/L    Chloride 106 94 - 109 mmol/L    Carbon Dioxide 24 20 - 32 mmol/L    Anion Gap 10 3 - 14 mmol/L    Glucose 100 (H) 70 - 99 mg/dL    Urea Nitrogen 10 7 - 30 mg/dL    Creatinine 0.58 0.52 - 1.04 mg/dL    GFR Estimate >90 >60 mL/min/1.7m2    GFR Estimate If Black >90 >60 mL/min/1.7m2    Calcium 9.6 8.5 - 10.1 mg/dL    Bilirubin Total 0.2 0.2 - 1.3 mg/dL    Albumin 3.8 3.4 - 5.0 g/dL    Protein Total 7.3 6.8 - 8.8 g/dL    Alkaline Phosphatase 89 40 - 150 U/L    ALT 27 0 - 50 U/L    AST 11 0 - 45 U/L   Alcohol   Result Value Ref Range    Ethanol g/dL <0.01 <0.01 g/dL   Acetaminophen level   Result Value Ref Range    Acetaminophen Level <2 mg/L   Salicylate level   Result Value Ref Range    Salicylate Level <2 mg/dL   Abdomen XR, 2 vw, flat and upright    Narrative    PRELIMINARY REPORT - The following report is a preliminary  interpretation.  Foreign body projects over the gastric antrum/pylorus. There is no  free air. Nonobstructive bowel gas pattern.   Chest XR,  PA & LAT    Narrative    PRELIMINARY REPORT - The following report is a preliminary  interpretation.   1. Clear chest.  2. Please see same-day abdominal radiograph for findings regarding  foreign body in the upper abdomen.   UPPER GI ENDOSCOPY   Result Value Ref Range    Upper GI Endoscopy       [address] [place] [address] [phone]     Endoscopy Department  _______________________________________________________________________________  Patient Name: Nevin  Jenny     Procedure Date: 4/16/2018 11:10 PM  MRN: [id number]                       Account Number: [id number]  YOB: 1990             Admit Type: Outpatient  Age: 27                                Gender: Female  Note Status: Finalized                Attending MD: Royer Moise MD  Total Sedation Time:                    _______________________________________________________________________________     Procedure:           Upper GI endoscopy  Indications:         Foreign body in the stomach  Providers:           Royer Moise MD, Chi Gore MD  Patient Profile:     28 yo Female with a complex psychiatric history and hx                        of multiple foreing body ingestions now admitted after                        ingesting 2 mickie pins at 6 PM .  Referring MD:        Oliva Cabello MD  Requesting Provider: Oliva Cabello MD  Medicines:           General Anesthesia  Complications:       No immediate complications.  _______________________________________________________________________________  Procedure:           Pre-Anesthesia Assessment:                       - Prior to the procedure, a History and Physical was                        performed, and patient medications and allergies were                        reviewed. The patient is competent. The risks and                        benefits of the procedure and the sedation options and                        risks were discussed with the patient. All questions                        were answered and informed consent was obtained. Patient                        identification and proposed procedure were verified by                        the physician in the pre-procedure area. Mental Status                        Examination: alert and oriented. Airway Examina tion:                        normal oropharyngeal airway and neck mobility.                        Prophylactic Antibiotics: The patient does not require                         prophylactic antibiotics. Prior Anticoagulants: The                        patient has taken no previous anticoagulant or                        antiplatelet agents. ASA Grade Assessment: II - A                        patient with mild systemic disease. After reviewing the                        risks and benefits, the patient was deemed in                        satisfactory condition to undergo the procedure. The                        anesthesia plan was to use general anesthesia.                        Immediately prior to administration of medications, the                        patient was re-assessed for adequacy to receive                        sedatives. The heart rate, respiratory rate, oxygen                        saturations, blood pressure, adequacy of pulmonary                        ventilation,  and response to care were monitored                        throughout the procedure. The physical status of the                        patient was re-assessed after the procedure.                       After obtaining informed consent, the endoscope was                        passed under direct vision. Throughout the procedure,                        the patient's blood pressure, pulse, and oxygen                        saturations were monitored continuously. The Endoscope                        was introduced through the mouth, and advanced to the                        second part of duodenum. The upper GI endoscopy was                        accomplished without difficulty. The patient tolerated                        the procedure well.                                                                                   Findings:       The examined esophagus was normal.       Two mickie pins were found in the gastric body with one extending through        the pylorus in to the duode num. Removal was accomplished with a        rat-toothed forceps and snare.       A medium amount of food  (residue) was found in the gastric fundus.       A single localized superficial erosion without bleeding was found in the        duodenal bulb likely related to trauma from the mickie pin.                                                                                   Impression:          - Normal esophagus.                       - Two mickie pins were found in the stomach. Removal was                        successful.                       - Superficial duodenal erosion without bleeding likely                        related to trauma from the mickie pin.  Recommendation:      - Return patient to hospital [facility] for observation.                       - 1:1 sitter while the patient is hospitalized. Very                        high risk for reingestion.                       - Psychiatric evaluation prior to discharge in the                        morning.                       - Clear  liquid diet and advance as tolerated from                        tomorrow.                                                                                     ____________________  Royer Moise MD  4/17/2018 12:53:45 AM  I was physically present for the entire viewing portion of the exam.  __________________________  Signature of teaching physician  [id number] Moise MD  Number of Addenda: 0    Note Initiated On: 4/16/2018 11:10 PM  Scope In:  Scope Out:       AXR:11 cm twisted wire in gastric fundus.    GI consulted at 2220.    Assessments & Plan (with Medical Decision Making)   Impression:  Young female presents with recurrent swallowed foreign body. She will be taken to the OR. She will be admitted to the ED observation unit for post procedure observation. Psych consult tomorrow.    I have reviewed the nursing notes.    I have reviewed the findings, diagnosis, plan and need for follow up with the patient.    Current Discharge Medication List          Final diagnoses:   Foreign body in stomach, initial encounter        4/16/2018   [facility], [place], EMERGENCY DEPARTMENT     Gustavo Quintero MD  04/17/18 0140

## 2018-04-17 NOTE — ANESTHESIA PREPROCEDURE EVALUATION
Anesthesia Evaluation     . Pt has had prior anesthetic. Type: General    History of anesthetic complications   - PONV        ROS/MED HX    ENT/Pulmonary:       Neurologic:       Cardiovascular:         METS/Exercise Tolerance:     Hematologic:         Musculoskeletal:         GI/Hepatic:     (+) Other GI/Hepatic ingested foreign objects      Renal/Genitourinary:         Endo:     (+) Obesity, .      Psychiatric: Comment: Diagnoses of PTSD, borderline personality disorder, depression and anxiety. She has a history of ingesting foreign objects requiring procedural/surgical intervention.     (+) psychiatric history       Infectious Disease:         Malignancy:         Other:                     Physical Exam  Normal systems: dental    Airway   Mallampati: III  TM distance: >3 FB  Neck ROM: full    Dental     Cardiovascular   Rhythm and rate: regular and normal      Pulmonary    breath sounds clear to auscultation                        Anesthesia Plan      History & Physical Review  History and physical reviewed and following examination; no interval change.    ASA Status:  3 emergent.    NPO Status:  Waived due to emergency    Plan for General, RSI and ETT with Intravenous induction. Maintenance will be Balanced.    PONV prophylaxis:  Ondansetron (or other 5HT-3), Dexamethasone or Solumedrol and Other (See comment) (propofol infusion)       Postoperative Care  Postoperative pain management:  Multi-modal analgesia, IV analgesics and Oral pain medications.      Consents  Anesthetic plan, risks, benefits and alternatives discussed with:  Patient..          Alfred Townsend MD  12:32 AM April 17, 2018
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
## 2018-04-17 NOTE — H&P
[facility] ED Observation Admission Note    Chief Complaint   Patient presents with     Swallowed Foreign Body     2 mickie pins       Assessment/Plan:  1. Ingested Foreign Body: 6PM last night decided to swallow 2 mickie pins. Developed mild LUQ discomfort that progressively worsened. Denies any unusual stressors or incident that set her off. Describes it as just a habit that she has a hard time controlling and not an intent to harm herself. She references boredom or attention as the intent sometimes. Recently admitted to Psychiatry on 3/20/18 for swallowing hair pins in the context of worsening depressive symptoms and SIB. Per chart review, this year alone she has had 17 ED visits, 2 here at [place] and 15 ED visits/admissions at [facility], [facility] for foreign body ingestions requiring EGDs for extractions. She has had 2 ED visits this year for rectal insertions needing extraction one by ketamine at bedside and another requiring a laparotomy. 2/21/18 she OD'ed with 300mg benadryl and 40mg Compazine. Her last SIB was on 4/10/18 when she was admitted to [facility] for swallowing a straightened mickie pin. In ED, VSS.  CMP, CBC normal. Negative acetaminophen level, ethanol level, salicylate level. AXR shows foreign body projects over the gastric antrum/pylorus; no free air; nonobstructive bowel gas pattern. CXR reports clear chest. GI consulted and patient was taken for an EGD for extraction. During the procedure she received propofol, phenergan, zofran, a total of 200mcg of fentanyl, and dilaudid 0.5mg IV x 4. She was also given decadron 10mg IV. Here she is complaining of pruritis usually from phenergan and some LUQ pain ~ 5/10.   - GI consulted. Appreciate involvement and recommendations.   - ADAT to clear liquid diet. ADAT to regular diet in AM when cleared by GI  - NS at 125ml/hr  - Avoid narcotics  - Psychiatry Consult  - 1:1 sitter  - Zofran prn    2. Borderline Personality Disorder: - Continue with  PTA Latuda, Pristiq, Clonidine       HPI:    Nevin Alvarado is a 27 year old female with a history of ADD, MDD, PTSD, borderline personality disorder, multiple OD's, SIB with cutting and foreign body ingestion and insertion who presented to the ED after swallowing 2 mickie pins. She is being admitted to the Observation Unit after EGD procedure for extraction. She reports that about 6PM last night she decided to swallow 2 mickie pins. Shortly after she started having mild LUQ discomfort that progressively worsened. She states she is having some pain currently ~5/10, scratching/sharp in LUQ. She denies any unusual stressors or incidents that set her off. She describes it as just a habit that she has a hard time controlling and not an intent to harm herself. Usually when she sees the mickie pins she wants to ingest them. However she states that these 2 were her last ones at home and does not intend on getting more. She adds that with her h/o anorexia with bulimia she had the habit of inducing vomiting therefore swallowing things like a mickie pin does not bother her. She states that overall the frequency and quantity of FB injestion has decreased though it doesn't appear to be the case on chart review. She references boredom or attention as the intent sometimes. I questioned why she lives alone and not a Group home like she did previously or even with family to probably decrease the chances of this behavior. She basically insinuates that whether she has company or not it won't change her behavior. Also she reports that these services she can get from the state and would not like to give it up including housing and social/community resources/connections. She was recently admitted to Psychiatry on 3/20/18 for swallowing hair pins in the context of worsening depressive symptoms and SIB. Per chart review, this year alone she has had 17 ED visits, 2 here at [place] and 15 ED visits/admissions at , [facility], [facility]  [facility] for foreign body ingestions requiring EGDs for extractions. She has had 2 ED visits this year for rectal insertions needing extraction, one by ketamine at bedside and another requiring a laparotomy. 2/21/18 she OD'ed with 300mg benadryl and 40mg Compazine. Her last SIB was on 4/10/18 when she was admitted to [facility] for swallowing a straightened mickie pin. She states her medications were recently changed within the last month and is not sure if they are working. She has an automatic pill dispenser at home and states she has been compliant. She has a nurse that comes home to set up her medications weekly. She also has other resources and visits at home. She states she has not seen a therapist in a while. She has one set up for May 1st and also a new Psychiatry appointment on May 15 at [facility]. She reports she was previously seeing a Psychiatrist at [facility]. Per patient the Psychiatrist wanted her to to be seen by someone else as she felt as though she wasn't helping patient.     In the ED, HR 80's-90's, 's-140's/70's-100's, RR 14-18, SaO2 % on RA, Temp 98.5. Labs show normal CMP, CBC. Negative acetaminophen level, ethanol level, salicylate level. AXR shows foreign body projects over the gastric antrum/pylorus; no free air; nonobstructive bowel gas pattern. CXR reports clear chest. She was seen in the ED by GI and taken to the OR for an EGD for removal of foreign body.     During the procedure she received propofol, phenergan, zofran, a total of 200mcg of fentanyl, and dilaudid 0.5mg IV x 4. She was also given decadron 10mg IV. 2 mickie pins were retrieved successfully. Recovery in the PACU and brought to the Observation Unit thereafter.    Here she is complaining of pruritis usually from phenergan and some LUQ pain ~ 5/10.     Care Everywhere:  4/10/18 admitted at [place] for swallowing straightened mickie pin  4/4/18 admitted to [facility] for swallowing 2 mickie pins  3/25/17 admitted to [facility] for  swallowing 4 mickie pins  3/21/17 admitted to [facility] for swallowing 5 mickie pins  3/18/18 in ED at  for swallowing mickie pins  3/15/18 in ED at [place] for swallowing mickie pins  3/12/18 admitted to [place] for swallowing  3/3/18 admitted to [facility] for swallowing mickie pins  2/19/18 [facility] for swallowing straightend bodd  2/12/18 admitted to [facility] for swallowing pin  2/11/18 admitted to  for swallowing inside of a pen  2/8/18 2/19/18 admitted to [facility] for swallowing hair pin  2/6/18 admitted to [facility] for swallowing mickie pins  2/4/18 admitted to  for swallowing mickie pins  1/18/18 admitted to  for insertion of pill bottle in rectum  1/16/18 admitted to [facility] for insertion of capped pill bottle in rectum  1/8/18 admtted to [facility] for swallowing mickie pin    History:    Past Medical History:   Diagnosis Date     ADD (attention deficit disorder)      Anorexia nervosa with bulimia     history of; on Topamax     Anxiety      Borderline personality disorder      Depression      Depressive disorder      H/O self-harm      Lives in independent group home     due to debilitating mental illness     Migraine without aura     no known triggers; on Topamax bid and Imitrex PRN     Morbid obesity (H)      PTSD (post-traumatic stress disorder)      Rectal foreign body - Recurrent issue, self placed      Swallowed foreign body - Recurrent issue, self placed        Past Surgical History:   Procedure Laterality Date     ESOPHAGOSCOPY, GASTROSCOPY, DUODENOSCOPY (EGD), COMBINED N/A 3/9/2017    Procedure: COMBINED ESOPHAGOSCOPY, GASTROSCOPY, DUODENOSCOPY (EGD), REMOVE FOREIGN BODY;  Surgeon: Avis Guzmán MD;  Location: [facility]     ESOPHAGOSCOPY, GASTROSCOPY, DUODENOSCOPY (EGD), COMBINED N/A 4/20/2017    Procedure: COMBINED ESOPHAGOSCOPY, GASTROSCOPY, DUODENOSCOPY (EGD), REMOVE FOREIGN BODY;  EGD removal Foregin body;  Surgeon: Lokesh Paula MD;  Location: [facility] OR     ESOPHAGOSCOPY, GASTROSCOPY, DUODENOSCOPY  (EGD), COMBINED N/A 6/12/2017    Procedure: COMBINED ESOPHAGOSCOPY, GASTROSCOPY, DUODENOSCOPY (EGD);  COMBINED ESOPHAGOSCOPY, GASTROSCOPY, DUODENOSCOPY (EGD) [[id number]]attempted removal of foreign body;  Surgeon: Paemla Perez MD;  Location: [facility] OR     ESOPHAGOSCOPY, GASTROSCOPY, DUODENOSCOPY (EGD), COMBINED N/A 6/9/2017    Procedure: COMBINED ESOPHAGOSCOPY, GASTROSCOPY, DUODENOSCOPY (EGD), REMOVE FOREIGN BODY;  Esophagoscopy, Gastroscopy, Duodenoscopy, Removal of Foreign Body;  Surgeon: Dejon Marsh MD;  Location: [facility] OR     ESOPHAGOSCOPY, GASTROSCOPY, DUODENOSCOPY (EGD), COMBINED N/A 1/6/2018    Procedure: COMBINED ESOPHAGOSCOPY, GASTROSCOPY, DUODENOSCOPY (EGD), REMOVE FOREIGN BODY;  COMBINED ESOPHAGOSCOPY, GASTROSCOPY, DUODENOSCOPY (EGD) [by pascal net and snare with profol sedation;  Surgeon: Feliciano Emmanuel MD;  Location: [facility] GI     ESOPHAGOSCOPY, GASTROSCOPY, DUODENOSCOPY (EGD), COMBINED N/A 3/19/2018    Procedure: COMBINED ESOPHAGOSCOPY, GASTROSCOPY, DUODENOSCOPY (EGD), REMOVE FOREIGN BODY;   Esophagodscopy, Gastroscopy, Duodenoscopy,Foreign Body Removal;  Surgeon: Brice Guzmán MD;  Location: [facility] OR     EXAM UNDER ANESTHESIA ANUS N/A 1/10/2017    Procedure: EXAM UNDER ANESTHESIA ANUS;  Surgeon: Annmarie Haynes MD;  Location: [facility] OR     HC REMOVE FECAL IMPACTION OR FB W ANESTHESIA N/A 12/18/2016    Procedure: REMOVE FECAL IMPACTION/FOREIGN BODY UNDER ANESTHESIA;  Surgeon: Soham Cano MD;  Location: [facility] OR     KNEE SURGERY - removed a small tissue mass from knee Right      LAPAROSCOPIC ABLATION ENDOMETRIOSIS       LAPAROTOMY EXPLORATORY N/A 1/10/2017    Procedure: LAPAROTOMY EXPLORATORY;  Surgeon: Annmarie Haynes MD;  Location: [facility] OR     lymph nodes removed from neck; benign  age 7     MAMMOPLASTY REDUCTION Bilateral      RELEASE CARPAL TUNNEL Bilateral      SIGMOIDOSCOPY FLEXIBLE N/A 1/10/2017    Procedure: SIGMOIDOSCOPY FLEXIBLE;  Surgeon:  Annmarie Haynes MD;  Location:  OR       Family History   Problem Relation Age of Onset     Type 2 Diabetes Maternal Grandmother      Type 2 Diabetes Paternal Grandmother      Breast Cancer Paternal Grandmother      CEREBROVASCULAR DISEASE Father 53     Myocardial Infarction No family hx of      Coronary Artery Disease Early Onset No family hx of      Ovarian Cancer No family hx of      Colon Cancer No family hx of        Social History     Social History     Marital status: Single     Spouse name: N/A     Number of children: N/A     Years of education: N/A     Occupational History     On disability      Social History Main Topics     Smoking status: Never Smoker     Smokeless tobacco: Never Used     Alcohol use No     Drug use: No     Sexual activity: Yes     Partners: Male     Birth control/ protection: IUD      Comment: IUD - Mirena - placed July, 2015     Other Topics Concern     Not on file     Social History Narrative    Single.    Living in independent living portion of [employer] [facility].    On disability.    No regular exercise.          No current facility-administered medications on file prior to encounter.   Current Outpatient Prescriptions on File Prior to Encounter:  lurasidone (LATUDA) 20 MG TABS tablet Take 2 tablets (40 mg) by mouth every morning   Desvenlafaxine Succinate (PRISTIQ PO) Take 100 mg by mouth every morning    Cholecalciferol (VITAMIN D3 PO) Take 2,000 Units by mouth every morning    MELATONIN PO Take 3 mg by mouth nightly as needed (sleep)   levonorgestrel (MIRENA) 20 MCG/24HR IUD 1 each (20 mcg) by Intrauterine route once for 1 dose       Data:    Results for orders placed or performed during the hospital encounter of 04/16/18   UPPER GI ENDOSCOPY   Result Value Ref Range    Upper GI Endoscopy       [address] [place] [address] [phone]     Endoscopy  Department  _______________________________________________________________________________  Patient Name: Nevin Alvarado     Procedure Date: 4/16/2018 11:10 PM  MRN: [id number]                       Account Number: [id number]  YOB: 1990             Admit Type: Outpatient  Age: 27                                Gender: Female  Note Status: Finalized                Attending MD: Royer Moise MD  Total Sedation Time:                    _______________________________________________________________________________     Procedure:           Upper GI endoscopy  Indications:         Foreign body in the stomach  Providers:           Royer Moise MD, Chi Gore MD  Patient Profile:     26 yo Female with a complex psychiatric history and hx                        of multiple foreing body ingestions now admitted after                        ingesting 2 mickie pins at 6 PM .  Referring MD:        Oliva Cabello MD  Requesting Provider: Oliva Cabello MD  Medicines:           General Anesthesia  Complications:       No immediate complications.  _______________________________________________________________________________  Procedure:           Pre-Anesthesia Assessment:                       - Prior to the procedure, a History and Physical was                        performed, and patient medications and allergies were                        reviewed. The patient is competent. The risks and                        benefits of the procedure and the sedation options and                        risks were discussed with the patient. All questions                        were answered and informed consent was obtained. Patient                        identification and proposed procedure were verified by                        the physician in the pre-procedure area. Mental Status                        Examination: alert and oriented. Airway Examina tion:                        normal  oropharyngeal airway and neck mobility.                        Prophylactic Antibiotics: The patient does not require                        prophylactic antibiotics. Prior Anticoagulants: The                        patient has taken no previous anticoagulant or                        antiplatelet agents. ASA Grade Assessment: II - A                        patient with mild systemic disease. After reviewing the                        risks and benefits, the patient was deemed in                        satisfactory condition to undergo the procedure. The                        anesthesia plan was to use general anesthesia.                        Immediately prior to administration of medications, the                        patient was re-assessed for adequacy to receive                        sedatives. The heart rate, respiratory rate, oxygen                        saturations, blood pressure, adequacy of pulmonary                        ventilation,  and response to care were monitored                        throughout the procedure. The physical status of the                        patient was re-assessed after the procedure.                       After obtaining informed consent, the endoscope was                        passed under direct vision. Throughout the procedure,                        the patient's blood pressure, pulse, and oxygen                        saturations were monitored continuously. The Endoscope                        was introduced through the mouth, and advanced to the                        second part of duodenum. The upper GI endoscopy was                        accomplished without difficulty. The patient tolerated                        the procedure well.                                                                                   Findings:       The examined esophagus was normal.       Two mickie pins were found in the gastric body with one extending through        the pylorus  in to the duode num. Removal was accomplished with a        rat-toothed forceps and snare.       A medium amount of food (residue) was found in the gastric fundus.       A single localized superficial erosion without bleeding was found in the        duodenal bulb likely related to trauma from the mickie pin.                                                                                   Impression:          - Normal esophagus.                       - Two mickie pins were found in the stomach. Removal was                        successful.                       - Superficial duodenal erosion without bleeding likely                        related to trauma from the mickie pin.  Recommendation:      - Return patient to [facility] for observation.                       - 1:1 sitter while the patient is hospitalized. Very                        high risk for reingestion.                       - Psychiatric evaluation prior to discharge in the                        morning.                       - Clear  liquid diet and advance as tolerated from                        tomorrow.                                                                                     ____________________  Royer Moise MD  4/17/2018 12:53:45 AM  I was physically present for the entire viewing portion of the exam.  __________________________  Signature of teaching physician  [id number] Moise MD  Number of Addenda: 0    Note Initiated On: 4/16/2018 11:10 PM  Scope In:  Scope Out:     Chest XR,  PA & LAT    Narrative    PRELIMINARY REPORT - The following report is a preliminary  interpretation.   1. Clear chest.  2. Please see same-day abdominal radiograph for findings regarding  foreign body in the upper abdomen.   Abdomen XR, 2 vw, flat and upright    Narrative    PRELIMINARY REPORT - The following report is a preliminary  interpretation.  Foreign body projects over the gastric antrum/pylorus. There is no  free air. Nonobstructive  bowel gas pattern.   CBC with platelets differential   Result Value Ref Range    WBC 9.7 4.0 - 11.0 10e9/L    RBC Count 4.47 3.8 - 5.2 10e12/L    Hemoglobin 12.8 11.7 - 15.7 g/dL    Hematocrit 39.3 35.0 - 47.0 %    MCV 88 78 - 100 fl    MCH 28.6 26.5 - 33.0 pg    MCHC 32.6 31.5 - 36.5 g/dL    RDW 14.5 10.0 - 15.0 %    Platelet Count 245 150 - 450 10e9/L    Diff Method Automated Method     % Neutrophils 73.4 %    % Lymphocytes 20.2 %    % Monocytes 4.2 %    % Eosinophils 1.8 %    % Basophils 0.2 %    % Immature Granulocytes 0.2 %    Nucleated RBCs 0 0 /100    Absolute Neutrophil 7.1 1.6 - 8.3 10e9/L    Absolute Lymphocytes 2.0 0.8 - 5.3 10e9/L    Absolute Monocytes 0.4 0.0 - 1.3 10e9/L    Absolute Eosinophils 0.2 0.0 - 0.7 10e9/L    Absolute Basophils 0.0 0.0 - 0.2 10e9/L    Abs Immature Granulocytes 0.0 0 - 0.4 10e9/L    Absolute Nucleated RBC 0.0    Comprehensive metabolic panel   Result Value Ref Range    Sodium 140 133 - 144 mmol/L    Potassium 4.1 3.4 - 5.3 mmol/L    Chloride 106 94 - 109 mmol/L    Carbon Dioxide 24 20 - 32 mmol/L    Anion Gap 10 3 - 14 mmol/L    Glucose 100 (H) 70 - 99 mg/dL    Urea Nitrogen 10 7 - 30 mg/dL    Creatinine 0.58 0.52 - 1.04 mg/dL    GFR Estimate >90 >60 mL/min/1.7m2    GFR Estimate If Black >90 >60 mL/min/1.7m2    Calcium 9.6 8.5 - 10.1 mg/dL    Bilirubin Total 0.2 0.2 - 1.3 mg/dL    Albumin 3.8 3.4 - 5.0 g/dL    Protein Total 7.3 6.8 - 8.8 g/dL    Alkaline Phosphatase 89 40 - 150 U/L    ALT 27 0 - 50 U/L    AST 11 0 - 45 U/L   Alcohol   Result Value Ref Range    Ethanol g/dL <0.01 <0.01 g/dL   Acetaminophen level   Result Value Ref Range    Acetaminophen Level <2 mg/L   Salicylate level   Result Value Ref Range    Salicylate Level <2 mg/dL        ROS:    Review Of Systems  Skin: itching  Eyes: negative  Ears/Nose/Throat: negative  Respiratory: No shortness of breath, dyspnea on exertion, cough, or hemoptysis  Cardiovascular: negative  Gastrointestinal: positive for abdominal  pain, negative for, nausea, vomiting, heartburn, reflux, melena, hematochezia, constipation and diarrhea  Genitourinary: negative  Musculoskeletal: negative  Neurologic: negative  Psychiatric: anxiety and depression  Hematologic/Lymphatic/Immunologic: negative for, chills and fever  Endocrine: negative  PCP: Dr. Knight    10 point ROS negative other than the symptoms noted above.      Exam:  Vitals:  B/P: 128/82, T: 98.8, P: 99, R: 14    Constitutional: over weight, alert, no distress and cooperative  Head: Normocephalic. No masses, lesions, tenderness or abnormalities  Neck: Neck supple. No adenopathy. Thyroid symmetric, normal size,, Carotids without bruits.  ENT: ENT exam normal, no neck nodes or sinus tenderness  Cardiovascular: negative, PMI normal. No lifts, heaves, or thrills. RRR. No murmurs, clicks gallops or rub  Respiratory: negative, Percussion normal. Good diaphragmatic excursion. Lungs clear  Gastrointestinal: Abdomen soft, epigastric and LUQ tenderness. BS normal. No masses, organomegaly  : Deferred  Musculoskeletal: extremities normal- no gross deformities noted, gait normal and normal muscle tone  Skin: no suspicious lesions or rashes  Neurologic: Gait normal. Reflexes normal and symmetric. Sensation grossly WNL.  Psychiatric: mentation appears normal and affect normal/bright. No SI, HI.  Hematologic/Lymphatic/Immunologic: normal ant/post cervical, axillary, supraclavicular and inguinal nodes    Consults: Psychiatry, SW  FEN: ADAT to [facility]  DVT prophylaxis: encourage ambulation; expect short stay  GI prophylaxis: protonix  BM prophylaxis: pericolace  Code Status: full code  Disposition: Psychiatry consult and then disposition to be determined thereafter    Signed:  Tyson Richardson PA-C   April 17, 2018 at 1:07 AM

## 2018-04-17 NOTE — ED NOTES
Pt presents after intentionally swallowing 2 mickie pins around 6pm today. She states she feels like they may be stuck and having some pain in her LUQ.

## 2018-04-17 NOTE — PLAN OF CARE
Problem: Patient Care Overview  Goal: Plan of Care/Patient Progress Review  Outpatient/Observation goals to be met before discharge home:    -diagnostic tests and consults completed and resulted : no  -vital signs normal or at patient baseline : yes  -tolerating oral intake to maintain hydration : yes  -adequate pain control on oral analgesics : yes  -returns to baseline functional status :yes  -safe disposition plan has been identified: no   -psych consult : no  -hold until safe disposition has been identified : no

## 2018-04-17 NOTE — PROGRESS NOTES
"Emergency Medicine Observation Attending note    The patient was independently seen and examined by me. The chart, vital signs, and labs were reviewed. The patient's findings were discussed with the MARYCRUZ on the observation unit, and I agree with the findings of the note and the plan.    26 yo female, admitted to the OBS unit post-OR after presenting to the ER c/o LUQ abd pain after intentionally swallowing 2 mickie pins. She has a long history of swallowing foreign bodies, as well as inserting foreign bodies into her rectum. She's required multiple EGDs, exams under anesthesia, as well as more than one exploratory laparotomy since 2016. She currently lives independently. She denies suicidal ideation or intent. She was taken to the OR by GI to remove the foreign bodies under anesthesia. This morning she reports that she's feeling much better. No pain, nausea or vomiting.    /85  Pulse 52  Temp 98.2  F (36.8  C) (Oral)  Resp 16  Ht 1.575 m (5' 2\")  Wt 111 kg (244 lb 11.4 oz)  SpO2 96%  BMI 44.76 kg/m2    Exam:  General: awake, alert, NAD  HEENT: NC/AT, oropharynx moist and clear  Neck: supple  Lungs: CTA-B  Heart: RRR, no M/R/G  Abd: soft, ND/NT  Ext: non-tender, no edema      Assessment/plan:  1. Foreign body ingestion - removed   2. Psych - pt has h/o borderline personality disorder, states that she is not suicidal, and that she just swallows things. Given the sheer number of times she has done similar and required intervention, I'm quite concerned about her ability to keep herself safe. We will consult psychiatry for an expert opinion.   "

## 2018-04-17 NOTE — ANESTHESIA POSTPROCEDURE EVALUATION
Patient: Nevin Alvarado    Procedure(s):  Esophagogastroduodenoscopy  Foreign Body Removal - Wound Class: II-Clean Contaminated    Diagnosis:foreign body ingestion  Diagnosis Additional Information: No value filed.    Anesthesia Type:  General, ETT    Note:  Anesthesia Post Evaluation    Patient location during evaluation: PACU  Patient participation: Able to fully participate in evaluation  Level of consciousness: awake and alert  Pain management: adequate  Airway patency: patent  Cardiovascular status: acceptable  Respiratory status: acceptable  Hydration status: acceptable  PONV: none     Anesthetic complications: None          Last vitals:  Vitals:    04/17/18 0100 04/17/18 0115 04/17/18 0130   BP: 128/82 131/84 141/86   Pulse:      Resp: 14 14 14   Temp: 37.1  C (98.8  F)     SpO2: 99% 98% 98%         Electronically Signed By: Alfred Townsend MD  April 17, 2018  1:46 AM

## 2018-04-17 NOTE — PROCEDURES
Gastroenterology Brief Operative Note    Procedure:  Upper endoscopy   Post-operative diagnosis:  Successful removal of foreign body   Staff Physician:  Dr. Royer Moise   Fellow/Assistant(s):  Chi Gore MD    Specimens:  Please see final procedure note for further details.   Findings:  - 2 mickie pins in the stomach. Removed using forceps and snare successfully.   Complications:  None.   Condition:  Stable   Recommendations  Diet:  Clear liquid diet  PPI:  N/A  Anti-coagulants/platelets:  N/A  Octreotide:  N/A  Discharge Planning:   - Patient should be admitted under observation overnight and evaluated by the psychiatry team in the morning prior to discharge. High risk for re-ingestion.  - 1:1 sitter at all times while the patient is hospitalized.

## 2018-04-17 NOTE — DISCHARGE SUMMARY
Discharge Summary    Nevin Alvarado MRN# [id number]   YOB: 1990 Age: 27 year old     Date of Admission:  4/16/2018  Date of Discharge:  4/17/2018  Admitting Physician:  Oliva Cabello MD  Discharge Physician:  Oliva Cabello MD  Discharging Service:  Emergency Medicine     Primary Provider: Latonya Knight          Discharge Diagnosis:     Foreign body ingestion    * No resolved hospital problems. *               Discharge Disposition:   Discharged to home           Condition on Discharge:   Discharge condition: Stable   Code status on discharge: Full Code           Procedures:   Invasive procedures: EGD for foreign body removal          Discharge Medications:     Current Discharge Medication List      CONTINUE these medications which have CHANGED    Details   lurasidone (LATUDA) 20 MG TABS tablet Take 2 tablets (40 mg) by mouth every evening  Qty: 12 tablet, Refills: 0    Associated Diagnoses: Borderline personality disorder; Severe episode of recurrent major depressive disorder, without psychotic features (H)         CONTINUE these medications which have NOT CHANGED    Details   cyclobenzaprine (FLEXERIL) 10 MG tablet Take 10 mg by mouth 3 times daily as needed      CLONIDINE HCL PO Take 0.1 mg by mouth 2 times daily      Desvenlafaxine Succinate (PRISTIQ PO) Take 100 mg by mouth every morning       Cholecalciferol (VITAMIN D3 PO) Take 2,000 Units by mouth every morning       ondansetron (ZOFRAN-ODT) 4 MG ODT tab Take 4 mg by mouth every 6 hours as needed      MELATONIN PO Take 3 mg by mouth nightly as needed (sleep)      levonorgestrel (MIRENA) 20 MCG/24HR IUD 1 each (20 mcg) by Intrauterine route once for 1 dose                   Consultations:   Consultation during this admission received from gastroenterology             Brief History of Illness:   Please see detailed H&P from 4/17/18, in brief: Nevin Alvarado is a 27 year old female with a history of ADD, MDD,  PTSD, borderline personality disorder, multiple OD's, SIB with cutting and foreign body ingestion and insertion who presented to the ED after swallowing 2 mickie pins.           Hospital Course:   1. Ingested Foreign Body: 6PM last night decided to swallow 2 mickie pins. Developed mild LUQ discomfort that progressively worsened. Denies any unusual stressors or incident that set her off. Describes it as just a habit that she has a hard time controlling and not an intent to harm herself. She references boredom or attention as the intent sometimes. Recently admitted to Psychiatry on 3/20/18 for swallowing hair pins in the context of worsening depressive symptoms and SIB. Per chart review, this year alone she has had 17 ED visits, 2 here at [facility] and 15 ED visits/admissions at [facility] for foreign body ingestions requiring EGDs for extractions. She has had 2 ED visits this year for rectal insertions needing extraction one by ketamine at bedside and another requiring a laparotomy. 2/21/18 she OD'ed with 300mg benadryl and 40mg Compazine. Her last SIB was on 4/10/18 when she was admitted to [facility] for swallowing a straightened mickie pin. In ED, VSS.  CMP, CBC normal. Negative acetaminophen level, ethanol level, salicylate level. AXR shows foreign body projects over the gastric antrum/pylorus; no free air; nonobstructive bowel gas pattern. CXR reports clear chest. GI consulted and patient was taken for an EGD for extraction. During the procedure she received propofol, phenergan, zofran, a total of 200mcg of fentanyl, and dilaudid 0.5mg IV x 4. She was also given decadron 10mg IV. Here she is complaining of pruritis usually from phenergan and some LUQ pain ~ 5/10.  Her abdominal pain improved while in the observation unit, she was able to tolerate clear liquid diet that was advanced to a regular diet without issue. VSS, afebrile.  Psychiatry consulted and recommended switching Latuda to  "evenings instead of mornings.  Psych ok with discharge to home.       2. Borderline Personality Disorder: - Continue with PTA Latuda, Pristiq, Clonidine                Final Day of Progress before Discharge:       Physical Exam:  Blood pressure 113/58, pulse 52, temperature 98.2  F (36.8  C), temperature source Oral, resp. rate 18, height 1.575 m (5' 2\"), weight 111 kg (244 lb 11.4 oz), SpO2 96 %, not currently breastfeeding.    EXAM:  Exam:  Constitutional: healthy, alert and no distress  Head: Normocephalic. No masses, lesions, tenderness or abnormalities  Neck: Neck supple. No adenopathy.   ENT: ENT exam normal, no neck nodes or sinus tenderness, airway patent  Cardiovascular: No lifts, heaves, or thrills. RRR. No murmurs, clicks gallops or rub  Respiratory: Good diaphragmatic excursion. Lungs clear  Gastrointestinal: Abdomen soft, non-tender. BS normal. No masses, organomegaly  Musculoskeletal: extremities normal- no gross deformities noted, gait normal and normal muscle tone  Skin: no suspicious lesions or rashes  Neurologic: Gait normal. Alert and oriented.   Psychiatric: mentation appears normal and affect normal/bright    /58 (BP Location: Right arm)  Pulse 52  Temp 98.2  F (36.8  C) (Oral)  Resp 18  Ht 1.575 m (5' 2\")  Wt 111 kg (244 lb 11.4 oz)  SpO2 96%  BMI 44.76 kg/m2             Data:  All laboratory data reviewed             Significant Results:     Results for orders placed or performed during the hospital encounter of 04/16/18   UPPER GI ENDOSCOPY   Result Value Ref Range    Upper GI Endoscopy       [address] [place] [address] [phone]     Endoscopy Department  _______________________________________________________________________________  Patient Name: Nevin Alvarado     Procedure Date: 4/16/2018 11:10 PM  MRN: [id number]                       Account Number: [id number]  YOB: 1990             Admit Type: Outpatient  Age: " 27                                Gender: Female  Note Status: Finalized                Attending MD: Royer Moise MD  Total Sedation Time:                    _______________________________________________________________________________     Procedure:           Upper GI endoscopy  Indications:         Foreign body in the stomach  Providers:           Royer Moise MD, Chi Gore MD  Patient Profile:     28 yo Female with a complex psychiatric history and hx                        of multiple foreing body ingestions now admitted after                        ingesting 2 mickie pins at 6 PM .  Referring MD:        Oliav Cabello MD  Requesting Provider: Oliva Cabello MD  Medicines:           General Anesthesia  Complications:       No immediate complications.  _______________________________________________________________________________  Procedure:           Pre-Anesthesia Assessment:                       - Prior to the procedure, a History and Physical was                        performed, and patient medications and allergies were                        reviewed. The patient is competent. The risks and                        benefits of the procedure and the sedation options and                        risks were discussed with the patient. All questions                        were answered and informed consent was obtained. Patient                        identification and proposed procedure were verified by                        the physician in the pre-procedure area. Mental Status                        Examination: alert and oriented. Airway Examina tion:                        normal oropharyngeal airway and neck mobility.                        Prophylactic Antibiotics: The patient does not require                        prophylactic antibiotics. Prior Anticoagulants: The                        patient has taken no previous anticoagulant or                        antiplatelet agents.  ASA Grade Assessment: II - A                        patient with mild systemic disease. After reviewing the                        risks and benefits, the patient was deemed in                        satisfactory condition to undergo the procedure. The                        anesthesia plan was to use general anesthesia.                        Immediately prior to administration of medications, the                        patient was re-assessed for adequacy to receive                        sedatives. The heart rate, respiratory rate, oxygen                        saturations, blood pressure, adequacy of pulmonary                        ventilation,  and response to care were monitored                        throughout the procedure. The physical status of the                        patient was re-assessed after the procedure.                       After obtaining informed consent, the endoscope was                        passed under direct vision. Throughout the procedure,                        the patient's blood pressure, pulse, and oxygen                        saturations were monitored continuously. The Endoscope                        was introduced through the mouth, and advanced to the                        second part of duodenum. The upper GI endoscopy was                        accomplished without difficulty. The patient tolerated                        the procedure well.                                                                                   Findings:       The examined esophagus was normal.       Two mickie pins were found in the gastric body with one extending through        the pylorus in to the duode num. Removal was accomplished with a        rat-toothed forceps and snare.       A medium amount of food (residue) was found in the gastric fundus.       A single localized superficial erosion without bleeding was found in the        duodenal bulb likely related to trauma from the mickie  pin.                                                                                   Impression:          - Normal esophagus.                       - Two mickie pins were found in the stomach. Removal was                        successful.                       - Superficial duodenal erosion without bleeding likely                        related to trauma from the mickie pin.  Recommendation:      - Return patient to [facility] for [facility].                       - 1:1 sitter while the patient is hospitalized. Very                        high risk for reingestion.                       - Psychiatric evaluation prior to discharge in the                        morning.                       - Clear  liquid diet and advance as tolerated from                        tomorrow.                                                                                     ____________________  Royer Moise MD  4/17/2018 12:53:45 AM  I was physically present for the entire viewing portion of the exam.  __________________________  Signature of teaching physician  [id number] Moise MD  Number of Addenda: 0    Note Initiated On: 4/16/2018 11:10 PM  Scope In:  Scope Out:     Chest XR,  PA & LAT    Narrative    Exam: XR CHEST 2 VW, 4/16/2018 10:14 PM    Indication: swallowed mickie pins;     Comparison: 1/5/2018    Findings:   PA and lateral views the chest. Cardiac silhouette and pulmonary  vasculature are within normal limits. No focal airspace opacity,  pleural effusion, or pneumothorax. Bones appear normal. Foreign body  in the upper abdomen, better characterized on same day abdominal  radiograph.      Impression    Impression:   1. Clear chest.  2. Please see same-day abdominal radiograph for findings regarding  foreign body in the upper abdomen.    I have personally reviewed the examination and initial interpretation  and I agree with the findings.    ELE DENSON MD   Abdomen XR, 2 vw, flat and upright    Narrative     Exam: XR ABDOMEN 2 VW, 4/16/2018 10:14 PM    Indication: swallowed mickie pins;     Comparison: Radiograph 3/20/2018    Findings:   PA and lateral upright views of the abdomen. Metallic foreign body  projects over the gastric antrum/pylorus. There is no free air. A  nonobstructive bowel gas pattern. Mild stool burden. IUD. No acute  fracture.      Impression    Impression:   Foreign body projects over the gastric antrum/pylorus. There is no  free air. Nonobstructive bowel gas pattern.    I have personally reviewed the examination and initial interpretation  and I agree with the findings.    ELE DENSON MD   CBC with platelets differential   Result Value Ref Range    WBC 9.7 4.0 - 11.0 10e9/L    RBC Count 4.47 3.8 - 5.2 10e12/L    Hemoglobin 12.8 11.7 - 15.7 g/dL    Hematocrit 39.3 35.0 - 47.0 %    MCV 88 78 - 100 fl    MCH 28.6 26.5 - 33.0 pg    MCHC 32.6 31.5 - 36.5 g/dL    RDW 14.5 10.0 - 15.0 %    Platelet Count 245 150 - 450 10e9/L    Diff Method Automated Method     % Neutrophils 73.4 %    % Lymphocytes 20.2 %    % Monocytes 4.2 %    % Eosinophils 1.8 %    % Basophils 0.2 %    % Immature Granulocytes 0.2 %    Nucleated RBCs 0 0 /100    Absolute Neutrophil 7.1 1.6 - 8.3 10e9/L    Absolute Lymphocytes 2.0 0.8 - 5.3 10e9/L    Absolute Monocytes 0.4 0.0 - 1.3 10e9/L    Absolute Eosinophils 0.2 0.0 - 0.7 10e9/L    Absolute Basophils 0.0 0.0 - 0.2 10e9/L    Abs Immature Granulocytes 0.0 0 - 0.4 10e9/L    Absolute Nucleated RBC 0.0    Comprehensive metabolic panel   Result Value Ref Range    Sodium 140 133 - 144 mmol/L    Potassium 4.1 3.4 - 5.3 mmol/L    Chloride 106 94 - 109 mmol/L    Carbon Dioxide 24 20 - 32 mmol/L    Anion Gap 10 3 - 14 mmol/L    Glucose 100 (H) 70 - 99 mg/dL    Urea Nitrogen 10 7 - 30 mg/dL    Creatinine 0.58 0.52 - 1.04 mg/dL    GFR Estimate >90 >60 mL/min/1.7m2    GFR Estimate If Black >90 >60 mL/min/1.7m2    Calcium 9.6 8.5 - 10.1 mg/dL    Bilirubin Total 0.2 0.2 - 1.3 mg/dL    Albumin  3.8 3.4 - 5.0 g/dL    Protein Total 7.3 6.8 - 8.8 g/dL    Alkaline Phosphatase 89 40 - 150 U/L    ALT 27 0 - 50 U/L    AST 11 0 - 45 U/L   Alcohol   Result Value Ref Range    Ethanol g/dL <0.01 <0.01 g/dL   Acetaminophen level   Result Value Ref Range    Acetaminophen Level <2 mg/L   Salicylate level   Result Value Ref Range    Salicylate Level <2 mg/dL   Psychiatry IP Consult: h/o ptsd, mdd w/ multiple SIB now with foreign body ingestion - mickie pins; Consultant may enter orders: Yes; Patient to be seen: Routine; Call back #: [phone]    Narrative    Barney Randall MD     4/17/2018 11:03 AM  Patient seen and chart reviewed. Note dictated (initial)   RECOMMENDATIONS:  She is OK for d/c without transfer to IP psych  Taking Latuda in a.m.; makes her drowsy, so I advised her to take   after her evening meal   Can keep Pristiq and clonidine the same   She does have follow up schedule for psychotherapy and med   management, as well as safety plan   page me at [phone] as needed       Recent Results (from the past 48 hour(s))   Abdomen XR, 2 vw, flat and upright    Narrative    Exam: XR ABDOMEN 2 VW, 4/16/2018 10:14 PM    Indication: swallowed mickie pins;     Comparison: Radiograph 3/20/2018    Findings:   PA and lateral upright views of the abdomen. Metallic foreign body  projects over the gastric antrum/pylorus. There is no free air. A  nonobstructive bowel gas pattern. Mild stool burden. IUD. No acute  fracture.      Impression    Impression:   Foreign body projects over the gastric antrum/pylorus. There is no  free air. Nonobstructive bowel gas pattern.    I have personally reviewed the examination and initial interpretation  and I agree with the findings.    ELE DENSON MD   Chest XR,  PA & LAT    Narrative    Exam: XR CHEST 2 VW, 4/16/2018 10:14 PM    Indication: swallowed mickie pins;     Comparison: 1/5/2018    Findings:   PA and lateral views the chest. Cardiac silhouette and pulmonary  vasculature are within  normal limits. No focal airspace opacity,  pleural effusion, or pneumothorax. Bones appear normal. Foreign body  in the upper abdomen, better characterized on same day abdominal  radiograph.      Impression    Impression:   1. Clear chest.  2. Please see same-day abdominal radiograph for findings regarding  foreign body in the upper abdomen.    I have personally reviewed the examination and initial interpretation  and I agree with the findings.    ELE DENSON MD                Pending Results:   Unresulted Labs Ordered in the Past 30 Days of this Admission     Date and Time Order Name Status Description    4/17/2018 0039 Surgical pathology exam In process                   Discharge Instructions and Follow-Up:     Discharge Procedure Orders  Reason for your hospital stay   Order Comments: Foreign body ingestion     Adult [facility] Follow-up and recommended labs and tests   Order Comments: Follow up with primary care provider, Latonya Knight, within 7 days for hospital follow- up.     Appointments on [place] and/or [facility] (with [facility] or [facility] provider or service). Call [phone] if you haven't heard regarding these appointments within 7 days of discharge.     Activity   Order Comments: Your activity upon discharge: activity as tolerated   Order Specific Question Answer Comments   Is discharge order? Yes      When to contact your care team   Order Comments: Return to the ER if you have severe abdominal pain, vomiting, blood in vomit, shortness of breath     Diet   Order Comments: Follow this diet upon discharge: Orders Placed This Encounter     Advance Diet as Tolerated: Regular Diet Adult   Order Specific Question Answer Comments   Is discharge order? Yes             Attestation:  Liza Gold.  APRN, CNP

## 2018-04-17 NOTE — PROGRESS NOTES
"[facility]    Luminal Follow-up Note     Dx:  Foreign body ingestion.       24 hour events: Underwent upper endoscopy for retrieval of foreign body.  See procedure notes for complete details.      Subjective:  Reports transient abdominal pain post-procedure, now resolved.  Overall feels better.      Medications  Current Facility-Administered Medications   Medication Dose Route Frequency     cloNIDine (CATAPRES) tablet 0.1 mg  0.1 mg Oral BID     desvenlafaxine succinate  100 mg Oral QAM     lurasidone  40 mg Oral QAM     pantoprazole  40 mg Intravenous BID       Review of systems  A 10-point review of systems was negative.    Examination  /58 (BP Location: Right arm)  Pulse 52  Temp 98.2  F (36.8  C) (Oral)  Resp 18  Ht 1.575 m (5' 2\")  Wt 111 kg (244 lb 11.4 oz)  SpO2 96%  BMI 44.76 kg/m2    Intake/Output Summary (Last 24 hours) at 04/17/18 1251  Last data filed at 04/17/18 1000   Gross per 24 hour   Intake              460 ml   Output                0 ml   Net              460 ml       Gen- well, NAD, A+Ox3, normal color  RS- EWOB  Extr- warm   Neuro- intact  Skin- no rash    Laboratory  Lab Results   Component Value Date     04/16/2018    POTASSIUM 4.1 04/16/2018    CHLORIDE 106 04/16/2018    CO2 24 04/16/2018    BUN 10 04/16/2018    CR 0.58 04/16/2018       Lab Results   Component Value Date    BILITOTAL 0.2 04/16/2018    ALT 27 04/16/2018    AST 11 04/16/2018    ALKPHOS 89 04/16/2018       Lab Results   Component Value Date    WBC 9.7 04/16/2018    HGB 12.8 04/16/2018    MCV 88 04/16/2018     04/16/2018       Lab Results   Component Value Date    INR 1.03 03/19/2018     Radiology  CXR:  1. Clear chest.  2. Please see same-day abdominal radiograph for findings regarding foreign body in the upper abdomen.    AXR:  Foreign body projects over the gastric antrum/pylorus. There is no free air. Nonobstructive bowel gas pattern.     Assessment  27 year old Female " with a complex psychiatric history and hx of multiple foreing body ingestions now admitted after ingestion of two mickie pins.  Removal was successful.      Recommendations  - 1:1 sitter while the patient is hospitalized. Very high risk for reingestion.   - Advance diet as tolerated  - Note plan for discharge home after inpatient evaluation by psychiatry.  Appreciate their input.      Case discussed with Dr. Perez.  Please page with ? Or change in clinical status.        Mikala Kirkpatrick MD  [phone]

## 2018-04-17 NOTE — PROGRESS NOTES
Care Coordinator Progress Note     Admission Date/Time:  4/16/2018  Attending MD:  Oliva Cabello MD     Data  Chart reviewed, discussed with interdisciplinary team.   Patient was admitted for:    Foreign body in stomach, initial encounter  Borderline personality disorder  Severe episode of recurrent major depressive disorder, without psychotic features (H).    Concerns with insurance coverage for discharge needs: None.  Current Living Situation: Patient lives alone.  Support System: Supportive and Involved  Services Involved: ILS worker, , Cadi , Home Care  Transportation: [employer]  Barriers to Discharge: chronically ill and psychiatric illness    Coordination of Care and Referrals: Provided patient/family with options for discussion of pt current living situation.      Assessment   At bedside to discuss RNCC role and pt current living situation. Pt states she lives alone in her own apartment, but has several resources to assist her. Pt states she can call [employer] for transport today, or a family member. Pt states she has the following services: ILS worker, Cadi  (Hollie with [facility]), RN visit once per week ([facility]), and a  (Bceca with [facility]). Pt states she does *not* have their phone #'s readily available today. Pt voices no concerns at home.      Plan  Anticipated Discharge Date:  04/17/18  Anticipated Discharge Plan: DC with provider recommendations    Jihan Alegre RN [facility] ED/6D Obs  [phone]

## 2018-04-17 NOTE — PLAN OF CARE
Problem: Patient Care Overview  Goal: Plan of Care/Patient Progress Review  Outcome: No Change  Patient s/p EGD for intentional foreign body ingestion. Denies suicidal ideation. EGD successfully removed foreign bodies. Patient complains of pain in her abdomen, and itching. Benadryl given with some relief. Patient seems very anxious, asking for many things to get comfortable. AVSS except BP elevated and requiring 1L o2, MD aware. Attempting to wean oxygen. Sitter at bedside, will continue to monitor.

## 2018-04-19 LAB — COPATH REPORT: NORMAL

## 2018-05-08 ENCOUNTER — APPOINTMENT (OUTPATIENT)
Dept: GENERAL RADIOLOGY | Facility: CLINIC | Age: 28
End: 2018-05-08
Attending: INTERNAL MEDICINE
Payer: MEDICARE

## 2018-05-08 ENCOUNTER — SURGERY (OUTPATIENT)
Age: 28
End: 2018-05-08

## 2018-05-08 ENCOUNTER — HOSPITAL ENCOUNTER (EMERGENCY)
Facility: CLINIC | Age: 28
Discharge: HOME | End: 2018-05-09
Attending: INTERNAL MEDICINE | Admitting: COLON & RECTAL SURGERY
Payer: MEDICARE

## 2018-05-08 DIAGNOSIS — T18.5XXA FOREIGN BODY IN ANUS AND RECTUM, INITIAL ENCOUNTER: ICD-10-CM

## 2018-05-08 PROCEDURE — 45330 DIAGNOSTIC SIGMOIDOSCOPY: CPT | Performed by: COLON & RECTAL SURGERY

## 2018-05-08 PROCEDURE — 99283 EMERGENCY DEPT VISIT LOW MDM: CPT

## 2018-05-08 PROCEDURE — 74018 RADEX ABDOMEN 1 VIEW: CPT

## 2018-05-08 RX ORDER — PROPOFOL 10 MG/ML
200 INJECTION, EMULSION INTRAVENOUS ONCE
Status: COMPLETED | OUTPATIENT
Start: 2018-05-08 | End: 2018-05-09

## 2018-05-08 RX ORDER — LIDOCAINE 40 MG/G
CREAM TOPICAL
Status: CANCELLED | OUTPATIENT
Start: 2018-05-08

## 2018-05-08 RX ORDER — ONDANSETRON 2 MG/ML
4 INJECTION INTRAMUSCULAR; INTRAVENOUS
Status: CANCELLED | OUTPATIENT
Start: 2018-05-08

## 2018-05-08 RX ORDER — PROPOFOL 10 MG/ML
INJECTION, EMULSION INTRAVENOUS
Status: DISCONTINUED
Start: 2018-05-08 | End: 2018-05-09 | Stop reason: HOSPADM

## 2018-05-08 NOTE — ED AVS SNAPSHOT
[facility] Emergency Department    [address]    [facility] [address]    Phone:  [phone]    Fax:  [phone]                                       Nevin Alvarado   MRN: [id number]    Department:  [facility] Emergency Department   Date of Visit:  5/8/2018           After Visit Summary Signature Page     I have received my discharge instructions, and my questions have been answered. I have discussed any challenges I see with this plan with the nurse or doctor.    ..........................................................................................................................................  Patient/Patient Representative Signature      ..........................................................................................................................................  Patient Representative Print Name and Relationship to Patient    ..................................................               ................................................  Date                                            Time    ..........................................................................................................................................  Reviewed by Signature/Title    ...................................................              ..............................................  Date                                                            Time

## 2018-05-08 NOTE — LETTER
May 9, 2018      To Whom It May Concern:      Nevin Alvarado was seen in our Emergency Department today, 05/09/18.  I expect her condition to improve over the next 1 day.  She may return to work/school when improved.    Sincerely,        Jade Benito RN

## 2018-05-08 NOTE — ED AVS SNAPSHOT
[facility] Emergency Department    [address] [place] [address]    Phone:  [phone]    Fax:  [phone]                                       Nevin Alvarado   MRN: [id number]    Department:  [facility] Emergency Department   Date of Visit:  5/8/2018           Patient Information     Date Of Birth          1990        Your diagnoses for this visit were:     Foreign body in anus and rectum, initial encounter        You were seen by Ana Laura Quick MD.        Discharge Instructions         Rectal Foreign Body, Removed (Adult)  An object in the rectum is called a foreign body. Objects that are easy to put into the rectum can be very hard to get out again. Trying to remove them can lead to swelling and spasms of the rectal muscles. This can make getting the object out even harder.  An object stuck in the rectum can be very painful. It can cause bleeding. It may also puncture the wall of the rectum. This can be very serious. If the object is in the rectum for a time, infection can develop.  X-rays or other tests may be done to get a view of the object and where it is. The goal is to remove the object from the rectum without causing further damage. This can often be done safely in the emergency department. Medicine may be given to relax you and prevent pain during removal. After the object is removed, the rectum is examined for signs of injury or infection.    Home care  Medicine may be prescribed to help relieve pain and swelling. Take all medicine as directed. Antibiotics may be used to help treat or prevent infection. If these are prescribed, take them as directed until they are gone.  The following are general care guidelines:    If you were given relaxing medicine for the removal, do not drive or operate heavy equipment for 24 hours.    Don't put anything into your rectum for at least a week. This allows it to heal.    If passing stool is  painful, use a laxative or stool softener for a few days. Take these by mouth, instead of using a suppository.    If using vibrators or dildos for sex play, choose one made for insertion into the rectum. These have safety features that keep them from getting stuck.  Follow-up care  Follow up with your healthcare provider, or as advised. If you have damage to the rectum, you may be referred to a specialist.  Call 911  Call 911 if any of these occur:    Heavy bleeding    Swollen, tense, or very painful abdomen    Fainting or loss of consciousness    Trouble breathing    Rapid heart rate  When to seek medical advice  Call your healthcare provider right away if any of these occur:    Fever of 100.4 F (38 C) or higher, or as directed by your healthcare provider    Bleeding from the rectum    Rectal pain that gets worse    Nausea or vomiting  Date Last Reviewed: 6/1/2016 2000-2017 [employer]. [address] [place] [address]. All rights reserved. This information is not intended as a substitute for professional medical care. Always follow your healthcare professional's instructions.          24 Hour Appointment Hotline       To make an appointment at any [facility], call [phone] ([phone]). If you don't have a family doctor or clinic, we will help you find one. [facility] clinics are conveniently located to serve the needs of you and your family.             Review of your medicines      Our records show that you are taking the medicines listed below. If these are incorrect, please call your family doctor or clinic.        Dose / Directions Last dose taken    CLONIDINE HCL PO   Dose:  0.1 mg        Take 0.1 mg by mouth 2 times daily   Refills:  0        cyclobenzaprine 10 MG tablet   Commonly known as:  FLEXERIL   Dose:  10 mg        Take 10 mg by mouth 3 times daily as needed   Refills:  0        levonorgestrel 20 MCG/24HR IUD   Commonly known as:  MIRENA   Dose:  1 each         1 each (20 mcg) by Intrauterine route once for 1 dose   Refills:  0        lurasidone 20 MG Tabs tablet   Commonly known as:  LATUDA   Dose:  40 mg   Quantity:  12 tablet        Take 2 tablets (40 mg) by mouth every evening   Refills:  0        MELATONIN PO   Dose:  3 mg        Take 3 mg by mouth nightly as needed (sleep)   Refills:  0        ondansetron 4 MG ODT tab   Commonly known as:  ZOFRAN-ODT   Dose:  4 mg        Take 4 mg by mouth every 6 hours as needed   Refills:  0        PRISTIQ PO   Dose:  100 mg        Take 100 mg by mouth every morning   Refills:  0        VITAMIN D3 PO   Dose:  2000 Units        Take 2,000 Units by mouth every morning   Refills:  0                Procedures and tests performed during your visit     Abdomen XR 1 vw      Orders Needing Specimen Collection     None      Pending Results     No orders found for last 3 day(s).            Pending Culture Results     No orders found for last 3 day(s).            Pending Results Instructions     If you had any lab results that were not finalized at the time of your Discharge, you can call the ED Lab Result RN at [phone]. You will be contacted by this team for any positive Lab results or changes in treatment. The nurses are available 7 days a week from 10A to 6:30P.  You can leave a message 24 hours per day and they will return your call.        Test Results From Your Hospital Stay        5/8/2018 10:19 PM      Narrative     XR ABDOMEN 1 VW  5/8/2018 9:38 PM     HISTORY:  eval for rectal foreign body;     COMPARISON: None.    FINDINGS:  There is a rectangular lucency projected over the mid  pelvis measuring about 7.8 cm in cephalocaudad dimension by 3.7 cm in  transverse dimension. This would be consistent with a foreign body.    PELON MELÉNDEZ MD                Clinical Quality Measure: Blood Pressure Screening     Your blood pressure was checked while you were in the emergency department today. The last reading we obtained was  BP: 102/50 .  Please read the guidelines below about what these numbers mean and what you should do about them.  If your systolic blood pressure (the top number) is less than 120 and your diastolic blood pressure (the bottom number) is less than 80, then your blood pressure is normal. There is nothing more that you need to do about it.  If your systolic blood pressure (the top number) is 120-139 or your diastolic blood pressure (the bottom number) is 80-89, your blood pressure may be higher than it should be. You should have your blood pressure rechecked within a year by a primary care provider.  If your systolic blood pressure (the top number) is 140 or greater or your diastolic blood pressure (the bottom number) is 90 or greater, you may have high blood pressure. High blood pressure is treatable, but if left untreated over time it can put you at risk for heart attack, stroke, or kidney failure. You should have your blood pressure rechecked by a primary care provider within the next 4 weeks.  If your provider in the emergency department today gave you specific instructions to follow-up with your doctor or provider even sooner than that, you should follow that instruction and not wait for up to 4 weeks for your follow-up visit.        Thank you for choosing [facility]       Thank you for choosing [facility] for your care. Our goal is always to provide you with excellent care. Hearing back from our patients is one way we can continue to improve our services. Please take a few minutes to complete the written survey that you may receive in the mail after you visit with us. Thank you!        [employer] Information     [facility] gives you secure access to your electronic health record. If you see a primary care provider, you can also send messages to your care team and make appointments. If you have questions, please call your primary care clinic.  If you do not have a primary care provider, please call [phone] and they will assist you.         Care EveryWhere ID     This is your Care EveryWhere ID. This could be used by other organizations to access your [facility] medical records  [id number]        Equal Access to Services     ZENON DIAMOND : Gabriel Collado, erick singh, taylor tenorio, mic persaud. So [facility] [phone].    ATENCIÓN: Si habla español, tiene a singh disposición servicios gratuitos de asistencia lingüística. Llame al [phone].    We comply with applicable federal civil rights laws and [place] laws. We do not discriminate on the basis of race, color, national origin, age, disability, sex, sexual orientation, or gender identity.            After Visit Summary       This is your record. Keep this with you and show to your community pharmacist(s) and doctor(s) at your next visit.

## 2018-05-09 VITALS
TEMPERATURE: 98 F | OXYGEN SATURATION: 95 % | DIASTOLIC BLOOD PRESSURE: 75 MMHG | RESPIRATION RATE: 20 BRPM | SYSTOLIC BLOOD PRESSURE: 124 MMHG

## 2018-05-09 PROCEDURE — 40000275 ZZH STATISTIC RCP TIME EA 10 MIN

## 2018-05-09 PROCEDURE — 25000128 H RX IP 250 OP 636: Performed by: INTERNAL MEDICINE

## 2018-05-09 RX ORDER — KETOROLAC TROMETHAMINE 15 MG/ML
15 INJECTION, SOLUTION INTRAMUSCULAR; INTRAVENOUS ONCE
Status: COMPLETED | OUTPATIENT
Start: 2018-05-09 | End: 2018-05-09

## 2018-05-09 RX ADMIN — PROPOFOL 180 MG: 10 INJECTION, EMULSION INTRAVENOUS at 00:17

## 2018-05-09 RX ADMIN — KETOROLAC TROMETHAMINE 15 MG: 15 INJECTION, SOLUTION INTRAMUSCULAR; INTRAVENOUS at 00:30

## 2018-05-09 NOTE — ED PROVIDER NOTES
History     Chief Complaint:  Foreign Body in Rectum    HPI   Nevin Alvarado is a 27 year old female who presents to the emergency department today for evaluation of a foreign body in her rectum. The patient states she placed an empty pill bottle in her rectum cap first, around one hour prior to arrival. The patient inserted this into her rectum during a sexual encounter. The patient has significant history of ingesting and inserting foreign bodies, requiring multiple upper endoscopies and anal exams under anaesthesia.     Allergies:  Penicillins  Blood-Group Specific Substance  Hydrocodone  Influenza Vaccines  Oseltamivir  Phenergan [Promethazine]  Vicodin [Hydrocodone-Acetaminophen]  Cephalosporins  Lamotrigine  Latex      Medications:    Cholecalciferol (VITAMIN D3 PO)  CLONIDINE HCL PO  cyclobenzaprine (FLEXERIL) 10 MG tablet  Desvenlafaxine Succinate (PRISTIQ PO)  levonorgestrel (MIRENA) 20 MCG/24HR IUD  lurasidone (LATUDA) 20 MG TABS tablet  MELATONIN PO  ondansetron (ZOFRAN-ODT) 4 MG ODT tab    Past Medical History:    ADD  Anorexia   Anxiety   Borderline personality disorder   Depression   Self harm   Migraine   Morbid obesity   Swallowed foreign body, recurrent issue  Rectal foreign body, recurrent issue     Past Surgical History:    EGD combined x7  Exploratory laparoscopy   Exam under anesthesia anus   Sigmoidoscopy     Family History:    Type two diabetes     Social History:  The patient was accompanied to the ED by herself.  Smoking Status: Never smoker  Smokeless Tobacco: Never used   Alcohol Use: No    Marital Status:  Single      Review of Systems   Gastrointestinal:        Foreign body in rectum     All other systems reviewed and are negative.    Physical Exam   First Vitals:  BP: 144/82  Heart Rate: 95  Temp: 98  F (36.7  C)  SpO2: 98 %    Physical Exam   Constitutional: She is cooperative.   HENT:   Mouth/Throat: Mucous membranes are normal.   Eyes: Conjunctivae are normal.   Neck: Normal  range of motion.   Cardiovascular: Regular rhythm and normal heart sounds.    Pulmonary/Chest: Effort normal and breath sounds normal.   Abdominal: Soft. Normal appearance and bowel sounds are normal. There is no rebound and no guarding.   Genitourinary: Rectal exam shows no mass.   Genitourinary Comments: No mass palpable digitally   Musculoskeletal: Normal range of motion.   Lymphadenopathy:     She has no cervical adenopathy.   Neurological: She is alert.   Skin: Skin is warm and dry.     Emergency Department Course     Imaging:  Radiology findings were communicated with the patient who voiced understanding of the findings.    XR Abdomen 1 View:  FINDINGS:  There is a rectangular lucency projected over the mid  pelvis measuring about 7.8 cm in cephalocaudad dimension by 3.7 cm in  transverse dimension. This would be consistent with a foreign body.  Rreading per radiology.      Emergency Department Course:  Nursing notes and vitals reviewed.  2120 I performed an exam of the patient as documented above.   The patient was sent for an abdomen x-ray while in the emergency department, results above.    2224 I spoke with Dr. Cano of the Colorectal Surgery service regarding patient's presentation, findings, and plan of care. He will come to perform endoscopic FB removal.   2340 I spoke with Dr. Cano of the Colorectal Surgery in the emergency department.   0005 Dr. Cano removed the patient's foreign body here in the emergency department.   0019 Patient rechecked and updated, and I spoke with Dr. Cano after the procedure.   Findings and plan explained to the Patient. Patient discharged home with instructions regarding supportive care, medications, and reasons to return. The importance of close follow-up was reviewed. I personally reviewed the imaging results with the Patient and answered all related questions prior to discharge.   Impression & Plan      Medical Decision Making:  Nevin Alvarado is a 27 year old  female with a long past medical history including multiple episodes of rectal foreign bodies presents with a rectal foreign body today. As noted, I was unable to palpate this but it was identified in the pelvis on x-ray. I contacted Dr. Cano of Colorectal surgery. He has now removed the foreign body endoscopically. Patient will be discharged.    Diagnosis:    ICD-10-CM    1. Foreign body in anus and rectum, initial encounter T18.5XXA      Disposition:  Discharged to home.     Scribe Disclosure:  I, Marcos Arceo, am serving as a scribe at 9:15 PM on 5/8/2018 to document services personally performed by Ana Laura Quick MD based on my observations and the provider's statements to me.    5/8/2018   [facility] EMERGENCY DEPARTMENT       Ana Laura Quick MD  05/09/18 0031

## 2018-05-09 NOTE — PROGRESS NOTES
RT Note      At bedside with MD. Spo2, heart rate, respiratory, and ETCO2 maintained throughout procedure. Emergency equipment at bedside.     Patient tolerated procedure well.       France Pryor, RRT

## 2018-05-09 NOTE — ED NOTES
Conscious sedation performed and foreign body removed from rectum. Pt tolerated procedure well. Pt maintained own airway without need for intervention. Pt alert at this time.

## 2018-05-09 NOTE — CONSULTS
Colon and Rectal Surgery Consult Note  Name: Nevin Alvarado    MRN: [id number]  YOB: 1990    Age: 27 year old  Date of admission: 5/8/2018  Primary care provider: Latonya Knight     Requesting Physician:  Dr. Quick  Reason for consult:  Rectal foreign body           History of Present Illness:   Nevin Alvarado is a 27 year old female well known to colorectal surgery service who presents with rectal foreign body consisting of a pill bottle. Pt has a long history of inserting objects into her rectum over the past 3 years including a flashlight, medicine bottle, paint tube, and perfume botttle x 2, both of which required an exploratory laparotomy for removal.  Most recently she was been swallowing objects over the past year, necessitating [facility] trips to the [facility] emergency room. She inserted a pill bottle into her rectum at 7 pm today cap first during a sexual act and was unable to retrieve it.              Past Medical History:     Past Medical History:   Diagnosis Date     ADD (attention deficit disorder)      Anorexia nervosa with bulimia     history of; on Topamax     Anxiety      Borderline personality disorder      Depression      Depressive disorder      H/O self-harm      Lives in independent group home     due to debilitating mental illness     Migraine without aura     no known triggers; on Topamax bid and Imitrex PRN     Morbid obesity (H)      PTSD (post-traumatic stress disorder)      Rectal foreign body - Recurrent issue, self placed      Swallowed foreign body - Recurrent issue, self placed              Past Surgical History:     Past Surgical History:   Procedure Laterality Date     ESOPHAGOSCOPY, GASTROSCOPY, DUODENOSCOPY (EGD), COMBINED N/A 3/9/2017    Procedure: COMBINED ESOPHAGOSCOPY, GASTROSCOPY, DUODENOSCOPY (EGD), REMOVE FOREIGN BODY;  Surgeon: Avis Guzmán MD;  [facility]     ESOPHAGOSCOPY, GASTROSCOPY, DUODENOSCOPY  (EGD), COMBINED N/A 4/20/2017    Procedure: COMBINED ESOPHAGOSCOPY, GASTROSCOPY, DUODENOSCOPY (EGD), REMOVE FOREIGN BODY;  EGD removal Foregin body;  Surgeon: Lokesh Paula MD;  [facility]     ESOPHAGOSCOPY, GASTROSCOPY, DUODENOSCOPY (EGD), COMBINED N/A 6/12/2017    Procedure: COMBINED ESOPHAGOSCOPY, GASTROSCOPY, DUODENOSCOPY (EGD);  COMBINED ESOPHAGOSCOPY, GASTROSCOPY, DUODENOSCOPY (EGD) [[id number]]attempted removal of foreign body;  Surgeon: Pamela Perez MD;  [facility]     ESOPHAGOSCOPY, GASTROSCOPY, DUODENOSCOPY (EGD), COMBINED N/A 6/9/2017    Procedure: COMBINED ESOPHAGOSCOPY, GASTROSCOPY, DUODENOSCOPY (EGD), REMOVE FOREIGN BODY;  Esophagoscopy, Gastroscopy, Duodenoscopy, Removal of Foreign Body;  Surgeon: Dejon Marsh MD;  [facility]     ESOPHAGOSCOPY, GASTROSCOPY, DUODENOSCOPY (EGD), COMBINED N/A 1/6/2018    Procedure: COMBINED ESOPHAGOSCOPY, GASTROSCOPY, DUODENOSCOPY (EGD), REMOVE FOREIGN BODY;  COMBINED ESOPHAGOSCOPY, GASTROSCOPY, DUODENOSCOPY (EGD) [by pascal net and snare with profol sedation;  Surgeon: Feliciano Emmanuel MD;  Location:  GI     ESOPHAGOSCOPY, GASTROSCOPY, DUODENOSCOPY (EGD), COMBINED N/A 3/19/2018    Procedure: COMBINED ESOPHAGOSCOPY, GASTROSCOPY, DUODENOSCOPY (EGD), REMOVE FOREIGN BODY;   Esophagodscopy, Gastroscopy, Duodenoscopy,Foreign Body Removal;  Surgeon: Brice Guzmán MD;  [facility]     ESOPHAGOSCOPY, GASTROSCOPY, DUODENOSCOPY (EGD), COMBINED N/A 4/16/2018    Procedure: COMBINED ESOPHAGOSCOPY, GASTROSCOPY, DUODENOSCOPY (EGD), REMOVE FOREIGN BODY;  Esophagogastroduodenoscopy  Foreign Body Removal X 2;  Surgeon: Royer Moise MD;  [facility]     EXAM UNDER ANESTHESIA ANUS N/A 1/10/2017    Procedure: EXAM UNDER ANESTHESIA ANUS;  Surgeon: Annmarie Haynes MD;  [facility]     HC REMOVE FECAL IMPACTION OR FB W ANESTHESIA N/A 12/18/2016    Procedure: REMOVE FECAL IMPACTION/FOREIGN BODY UNDER  ANESTHESIA;  Surgeon: Soham Cano MD;  Location: [facility] OR     KNEE SURGERY - removed a small tissue mass from knee Right      LAPAROSCOPIC ABLATION ENDOMETRIOSIS       LAPAROTOMY EXPLORATORY N/A 1/10/2017    Procedure: LAPAROTOMY EXPLORATORY;  Surgeon: Annmarie Haynes MD;  [facility]     lymph nodes removed from neck; benign  age 7     MAMMOPLASTY REDUCTION Bilateral      RELEASE CARPAL TUNNEL Bilateral      SIGMOIDOSCOPY FLEXIBLE N/A 1/10/2017    Procedure: SIGMOIDOSCOPY FLEXIBLE;  Surgeon: Annmarie Haynes MD;  [facility]               Social History:     Social History   Substance Use Topics     Smoking status: Never Smoker     Smokeless tobacco: Never Used     Alcohol use No             Family History:     Family History   Problem Relation Age of Onset     Type 2 Diabetes Maternal Grandmother      Type 2 Diabetes Paternal Grandmother      Breast Cancer Paternal Grandmother      CEREBROVASCULAR DISEASE Father 53     Myocardial Infarction No family hx of      Coronary Artery Disease Early Onset No family hx of      Ovarian Cancer No family hx of      Colon Cancer No family hx of              Allergies:     Allergies   Allergen Reactions     Penicillins Anaphylaxis     Blood-Group Specific Substance Other (See Comments)     Patient has an anti-Cw and non-specific antibodies. Blood product orders may be delayed. Draw one red top and two purple top tubes for all type/screen/crossmatch orders.     Hydrocodone Nausea and Vomiting     vomiting for days     Influenza Vaccines Other (See Comments)     Oseltamivir Hives     med stopped, PN: med stopped     Phenergan [Promethazine] Itching     Vicodin [Hydrocodone-Acetaminophen] Nausea and Vomiting     Cephalosporins Rash     Lamotrigine Rash     Possibly associated with Lamictal.      Latex Rash             Medications:             Review of Systems:   A comprehensive greater than 10 system review of systems was carried out.  Pertinent  positives and negatives are noted above.  Otherwise negative for contributory info.            Physical Exam:   Blood pressure 101/63, temperature 98  F (36.7  C), temperature source Temporal, SpO2 98 %, not currently breastfeeding.  No intake or output data in the 24 hours ending 05/09/18 0019  Exam:  General - Awake alert and oriented  Pulm - Non labored breathing  CVS - reg rate and rhythm  Abd - Soft, NT, ND  Rectal - Digital rectal exam reveals a pill bottle that is palpable just inside the anal verge.  Ext - warm without edema         Data:     Recent Results (from the past 24 hour(s))   Abdomen XR 1 vw    Narrative    XR ABDOMEN 1 VW  5/8/2018 9:38 PM     HISTORY:  eval for rectal foreign body;     COMPARISON: None.    FINDINGS:  There is a rectangular lucency projected over the mid  pelvis measuring about 7.8 cm in cephalocaudad dimension by 3.7 cm in  transverse dimension. This would be consistent with a foreign body.    PELON MELÉNDEZ MD       No results for input(s): WBC, HGB, HCT, MCV, PLT in the last 168 hours.       Lab Results   Component Value Date     04/16/2018     03/19/2018     02/15/2018    Lab Results   Component Value Date    CHLORIDE 106 04/16/2018    CHLORIDE 107 03/19/2018    CHLORIDE 106 02/15/2018    Lab Results   Component Value Date    BUN 10 04/16/2018    BUN 9 03/19/2018    BUN 12 02/15/2018      Lab Results   Component Value Date    POTASSIUM 4.1 04/16/2018    POTASSIUM 4.0 03/19/2018    POTASSIUM 3.6 02/15/2018    Lab Results   Component Value Date    CO2 24 04/16/2018    CO2 26 03/19/2018    CO2 27 02/15/2018    Lab Results   Component Value Date    CR 0.58 04/16/2018    CR 0.50 03/19/2018    CR 0.59 02/15/2018            Assessment and Plan:   27 year old female with rectal foreign body consisting of a pill bottle that became lodged in the rectum.  Foreign body can most likely be removed colonoscopically as it is palpable just above the anomuscular ring.  Plan  flexible sigmoidoscopy with foreign body removal. Risks and benefits explained. Understands and wishes to proceed.  Strongly advised the pt to stop inserting objects into her rectum as she could end up with a significant rectal injury that could require emergent surgery and a colostomy.          Soham Cano MD  [facility]  Pager:  [phone]  Phone: [phone]

## 2018-05-09 NOTE — OR NURSING
Flex sig with foreign body removal done in ED. ED RN gave propofol, monitored pt intra procedure and recovery.

## 2018-05-09 NOTE — DISCHARGE INSTRUCTIONS
Rectal Foreign Body, Removed (Adult)  An object in the rectum is called a foreign body. Objects that are easy to put into the rectum can be very hard to get out again. Trying to remove them can lead to swelling and spasms of the rectal muscles. This can make getting the object out even harder.  An object stuck in the rectum can be very painful. It can cause bleeding. It may also puncture the wall of the rectum. This can be very serious. If the object is in the rectum for a time, infection can develop.  X-rays or other tests may be done to get a view of the object and where it is. The goal is to remove the object from the rectum without causing further damage. This can often be done safely in the emergency department. Medicine may be given to relax you and prevent pain during removal. After the object is removed, the rectum is examined for signs of injury or infection.    Home care  Medicine may be prescribed to help relieve pain and swelling. Take all medicine as directed. Antibiotics may be used to help treat or prevent infection. If these are prescribed, take them as directed until they are gone.  The following are general care guidelines:    If you were given relaxing medicine for the removal, do not drive or operate heavy equipment for 24 hours.    Don't put anything into your rectum for at least a week. This allows it to heal.    If passing stool is painful, use a laxative or stool softener for a few days. Take these by mouth, instead of using a suppository.    If using vibrators or dildos for sex play, choose one made for insertion into the rectum. These have safety features that keep them from getting stuck.  Follow-up care  Follow up with your healthcare provider, or as advised. If you have damage to the rectum, you may be referred to a specialist.  Call 911  Call 911 if any of these occur:    Heavy bleeding    Swollen, tense, or very painful abdomen    Fainting or loss of consciousness    Trouble  breathing    Rapid heart rate  When to seek medical advice  Call your healthcare provider right away if any of these occur:    Fever of 100.4 F (38 C) or higher, or as directed by your healthcare provider    Bleeding from the rectum    Rectal pain that gets worse    Nausea or vomiting  Date Last Reviewed: 6/1/2016 2000-2017 [employer]. [address] [place] [address]. All rights reserved. This information is not intended as a substitute for professional medical care. Always follow your healthcare professional's instructions.

## 2018-05-21 LAB — FLEXIBLE SIGMOIDOSCOPY: NORMAL

## 2018-06-01 ENCOUNTER — HOSPITAL ENCOUNTER (EMERGENCY)
Facility: CLINIC | Age: 28
Discharge: HOME | End: 2018-06-01
Attending: EMERGENCY MEDICINE | Admitting: INTERNAL MEDICINE
Payer: MEDICARE

## 2018-06-01 ENCOUNTER — APPOINTMENT (OUTPATIENT)
Dept: GENERAL RADIOLOGY | Facility: CLINIC | Age: 28
End: 2018-06-01
Attending: EMERGENCY MEDICINE
Payer: MEDICARE

## 2018-06-01 VITALS
WEIGHT: 249 LBS | DIASTOLIC BLOOD PRESSURE: 86 MMHG | RESPIRATION RATE: 19 BRPM | BODY MASS INDEX: 45.82 KG/M2 | HEIGHT: 62 IN | OXYGEN SATURATION: 98 % | SYSTOLIC BLOOD PRESSURE: 132 MMHG | TEMPERATURE: 97.7 F | HEART RATE: 74 BPM

## 2018-06-01 DIAGNOSIS — T18.9XXA SWALLOWED FOREIGN BODY, INITIAL ENCOUNTER: ICD-10-CM

## 2018-06-01 LAB
ALBUMIN UR-MCNC: NEGATIVE MG/DL
APPEARANCE UR: CLEAR
BILIRUB UR QL STRIP: NEGATIVE
COLOR UR AUTO: YELLOW
GLUCOSE UR STRIP-MCNC: NEGATIVE MG/DL
HCG UR QL: NEGATIVE
HGB UR QL STRIP: NEGATIVE
HYALINE CASTS #/AREA URNS LPF: 1 /LPF (ref 0–2)
KETONES UR STRIP-MCNC: NEGATIVE MG/DL
LEUKOCYTE ESTERASE UR QL STRIP: NEGATIVE
MUCOUS THREADS #/AREA URNS LPF: PRESENT /LPF
NITRATE UR QL: NEGATIVE
PH UR STRIP: 5 PH (ref 5–7)
RBC #/AREA URNS AUTO: 1 /HPF (ref 0–2)
SOURCE: ABNORMAL
SP GR UR STRIP: 1.01 (ref 1–1.03)
SQUAMOUS #/AREA URNS AUTO: <1 /HPF (ref 0–1)
UROBILINOGEN UR STRIP-MCNC: 0 MG/DL (ref 0–2)
WBC #/AREA URNS AUTO: 1 /HPF (ref 0–5)

## 2018-06-01 PROCEDURE — 99285 EMERGENCY DEPT VISIT HI MDM: CPT | Mod: 25

## 2018-06-01 PROCEDURE — 74019 RADEX ABDOMEN 2 VIEWS: CPT

## 2018-06-01 PROCEDURE — 43247 EGD REMOVE FOREIGN BODY: CPT

## 2018-06-01 PROCEDURE — 99152 MOD SED SAME PHYS/QHP 5/>YRS: CPT

## 2018-06-01 PROCEDURE — 81025 URINE PREGNANCY TEST: CPT | Performed by: EMERGENCY MEDICINE

## 2018-06-01 PROCEDURE — 25000128 H RX IP 250 OP 636: Performed by: EMERGENCY MEDICINE

## 2018-06-01 PROCEDURE — 71046 X-RAY EXAM CHEST 2 VIEWS: CPT

## 2018-06-01 PROCEDURE — 43247 EGD REMOVE FOREIGN BODY: CPT | Performed by: INTERNAL MEDICINE

## 2018-06-01 PROCEDURE — 40000275 ZZH STATISTIC RCP TIME EA 10 MIN

## 2018-06-01 PROCEDURE — G0500 MOD SEDAT ENDO SERVICE >5YRS: HCPCS

## 2018-06-01 PROCEDURE — 81001 URINALYSIS AUTO W/SCOPE: CPT | Performed by: EMERGENCY MEDICINE

## 2018-06-01 RX ORDER — LIDOCAINE 40 MG/G
CREAM TOPICAL
Status: CANCELLED | OUTPATIENT
Start: 2018-06-01

## 2018-06-01 RX ORDER — FENTANYL CITRATE 50 UG/ML
50 INJECTION, SOLUTION INTRAMUSCULAR; INTRAVENOUS
Status: DISCONTINUED | OUTPATIENT
Start: 2018-06-01 | End: 2018-06-02 | Stop reason: HOSPADM

## 2018-06-01 RX ORDER — PROPOFOL 10 MG/ML
INJECTION, EMULSION INTRAVENOUS
Status: DISCONTINUED
Start: 2018-06-01 | End: 2018-06-02 | Stop reason: HOSPADM

## 2018-06-01 RX ORDER — LIDOCAINE 40 MG/G
CREAM TOPICAL
Status: DISCONTINUED | OUTPATIENT
Start: 2018-06-01 | End: 2018-06-02 | Stop reason: HOSPADM

## 2018-06-01 RX ORDER — FENTANYL CITRATE 50 UG/ML
25 INJECTION, SOLUTION INTRAMUSCULAR; INTRAVENOUS EVERY 5 MIN PRN
Status: DISCONTINUED | OUTPATIENT
Start: 2018-06-01 | End: 2018-06-02 | Stop reason: HOSPADM

## 2018-06-01 RX ORDER — FLUMAZENIL 0.1 MG/ML
0.2 INJECTION, SOLUTION INTRAVENOUS
Status: DISCONTINUED | OUTPATIENT
Start: 2018-06-01 | End: 2018-06-02 | Stop reason: HOSPADM

## 2018-06-01 RX ORDER — NALOXONE HYDROCHLORIDE 0.4 MG/ML
.1-.4 INJECTION, SOLUTION INTRAMUSCULAR; INTRAVENOUS; SUBCUTANEOUS
Status: DISCONTINUED | OUTPATIENT
Start: 2018-06-01 | End: 2018-06-02 | Stop reason: HOSPADM

## 2018-06-01 RX ORDER — ONDANSETRON 2 MG/ML
4 INJECTION INTRAMUSCULAR; INTRAVENOUS
Status: CANCELLED | OUTPATIENT
Start: 2018-06-01

## 2018-06-01 RX ORDER — PROPOFOL 10 MG/ML
80 INJECTION, EMULSION INTRAVENOUS ONCE
Status: COMPLETED | OUTPATIENT
Start: 2018-06-01 | End: 2018-06-01

## 2018-06-01 RX ORDER — SODIUM CHLORIDE 9 MG/ML
INJECTION, SOLUTION INTRAVENOUS CONTINUOUS
Status: DISCONTINUED | OUTPATIENT
Start: 2018-06-01 | End: 2018-06-02 | Stop reason: HOSPADM

## 2018-06-01 RX ADMIN — PROPOFOL 240 MG: 10 INJECTION, EMULSION INTRAVENOUS at 22:05

## 2018-06-01 ASSESSMENT — ENCOUNTER SYMPTOMS
ABDOMINAL PAIN: 1
COUGH: 0
SHORTNESS OF BREATH: 0

## 2018-06-01 NOTE — ED AVS SNAPSHOT
[facility] Emergency Department    [address] [place] [address]    Phone:  [phone]    Fax:  [phone]                                       Nevin Alvarado   MRN: [id number]    Department:  [facility] Emergency Department   Date of Visit:  6/1/2018           Patient Information     Date Of Birth          1990        Your diagnoses for this visit were:     Swallowed foreign body, initial encounter        You were seen by Campbell Encarnacion MD.      Follow-up Information     Follow up with Latonya Knight MD. Schedule an appointment as soon as possible for a visit in 4 days.    Specialty:  Family Practice    Contact information:    [address] [id number] [phone]          Discharge Instructions         Swallowed Foreign Body (Adult)  Indigestible objects (foreign bodies) are sometimes swallowed by adults. Whether or not the object moves all the way through the digestive tract depends on many factors. This includes the size and shape of the object, whether the object is sharp and pointy, and what the object is made of.  Based on your evaluation, no treatment is needed at this time. The swallowed object is expected to move through your digestive tract and pass out of the body in the stool with no problems. This may take about 24 to 48 hours, but could take longer depending on your bowel habits. If imaging tests were done, you will be told when the results are ready and if they affect your treatment.  Home care    Follow any instructions from your provider about eating and drinking. In certain cases, you may be told to only eat soft foods and drink liquids for the first 24 to 48 hours.    You will need to check your stool each time you have a bowel movement. This is so you can confirm that the object has passed, and look for signs of bleeding. If the object does not pass, it may mean that the object is stuck  somewhere along the digestive tract. In such cases, the object may need to be removed with a procedure.  Follow-up care  Follow up with your healthcare provider as advised. You will be told if further treatment is needed. In certain cases, you may need to return to have imaging tests done. Call your healthcare provider if you have any questions or concerns.  When to seek medical advice  Call your healthcare provider right away if any of these occur:    Belly pain, cramps, or swelling    Shortness of breath or coughing that won t stop    Trouble swallowing or pain with swallowing    Vomiting that won t stop    Blood in the stool (dark red or black color)    Fever of 100.4 F (38 C) or higher, or as directed by your provider  Call 911  Call 911 if any of these occur:    Trouble breathing, wheezing    Trouble speaking    Unusually fast heart rate    New or worsening chest pain    Vomiting blood (red or black)    Swelling of the neck    Inability to pass stool  Date Last Reviewed: 8/1/2017 2000-2017 [employer]. [address] [place] [address]. All rights reserved. This information is not intended as a substitute for professional medical care. Always follow your healthcare professional's instructions.          24 Hour Appointment Hotline       To make an appointment at any [facility], call [phone] ([phone]). If you don't have a family doctor or clinic, we will help you find one. [facility] are conveniently located to serve the needs of you and your family.             Review of your medicines      Our records show that you are taking the medicines listed below. If these are incorrect, please call your family doctor or clinic.        Dose / Directions Last dose taken    CLONIDINE HCL PO   Dose:  0.1 mg        Take 0.1 mg by mouth 2 times daily   Refills:  0        cyclobenzaprine 10 MG tablet   Commonly known as:  FLEXERIL   Dose:  10 mg        Take 10 mg by mouth 3  times daily as needed   Refills:  0        levonorgestrel 20 MCG/24HR IUD   Commonly known as:  MIRENA   Dose:  1 each        1 each (20 mcg) by Intrauterine route once for 1 dose   Refills:  0        lurasidone 20 MG Tabs tablet   Commonly known as:  LATUDA   Dose:  40 mg   Quantity:  12 tablet        Take 2 tablets (40 mg) by mouth every evening   Refills:  0        MELATONIN PO   Dose:  3 mg        Take 3 mg by mouth nightly as needed (sleep)   Refills:  0        ondansetron 4 MG ODT tab   Commonly known as:  ZOFRAN-ODT   Dose:  4 mg        Take 4 mg by mouth every 6 hours as needed   Refills:  0        PRISTIQ PO   Dose:  100 mg        Take 100 mg by mouth every morning   Refills:  0        VITAMIN D3 PO   Dose:  2000 Units        Take 2,000 Units by mouth every morning   Refills:  0                Procedures and tests performed during your visit     Abdomen XR, 2 vw, flat and upright    Chest XR,  PA & LAT    HCG qualitative urine    UA with Microscopic    UPPER GI ENDOSCOPY      Orders Needing Specimen Collection     None      Pending Results     No orders found from 5/30/2018 to 6/2/2018.            Pending Culture Results     No orders found from 5/30/2018 to 6/2/2018.            Pending Results Instructions     If you had any lab results that were not finalized at the time of your Discharge, you can call the ED Lab Result RN at [phone]. You will be contacted by this team for any positive Lab results or changes in treatment. The nurses are available 7 days a week from 10A to 6:30P.  You can leave a message 24 hours per day and they will return your call.        Test Results From Your Hospital Stay        6/1/2018  9:15 PM      Narrative     XR CHEST 2 VW   6/1/2018 7:48 PM     HISTORY: swallowed FB;     COMPARISON: Film dated 4/16/2018    FINDINGS: The heart is negative.  The lungs are clear. The pulmonary  vasculature is normal.  Iliopsoas convex towards the left..        Impression     IMPRESSION: No  radiopaque foreign bodies are identified.    No change.    PELON MELÉNDEZ MD         6/1/2018 10:21 PM      Narrative     ABDOMEN TWO VIEWS June 1, 2018 7:47 PM     HISTORY: Swallowed foreign body.     COMPARISON: None.        Impression     IMPRESSION: There is a 10 cm linear metallic foreign body in the upper  abdomen that is likely a wire or pin type foreign body. Bowel gas  pattern is normal. No free air. Elevated right hemidiaphragm.    ALEIDA CANO MD         6/1/2018  8:27 PM      Component Results     Component Value Ref Range & Units Status    Color Urine Yellow  Final    Appearance Urine Clear  Final    Glucose Urine Negative NEG^Negative mg/dL Final    Bilirubin Urine Negative NEG^Negative Final    Ketones Urine Negative NEG^Negative mg/dL Final    Specific Gravity Urine 1.013 1.003 - 1.035 Final    Blood Urine Negative NEG^Negative Final    pH Urine 5.0 5.0 - 7.0 pH Final    Protein Albumin Urine Negative NEG^Negative mg/dL Final    Urobilinogen mg/dL 0.0 0.0 - 2.0 mg/dL Final    Nitrite Urine Negative NEG^Negative Final    Leukocyte Esterase Urine Negative NEG^Negative Final    Source Midstream Urine  Final    WBC Urine 1 0 - 5 /HPF Final    RBC Urine 1 0 - 2 /HPF Final    Squamous Epithelial /HPF Urine <1 0 - 1 /HPF Final    Mucous Urine Present (A) NEG^Negative /LPF Final    Hyaline Casts 1 0 - 2 /LPF Final         6/1/2018  8:11 PM      Component Results     Component Value Ref Range & Units Status    HCG Qual Urine Negative NEG^Negative Final    This test is for screening purposes.  Results should be interpreted along with   the clinical picture.  Confirmation testing is available if warranted by   ordering [id number], HCG Quantitative Pregnancy.                  Clinical Quality Measure: Blood Pressure Screening     Your blood pressure was checked while you were in the emergency department today. The last reading we obtained was  BP: (!) 134/91 . Please read the guidelines below about what these numbers  mean and what you should do about them.  If your systolic blood pressure (the top number) is less than 120 and your diastolic blood pressure (the bottom number) is less than 80, then your blood pressure is normal. There is nothing more that you need to do about it.  If your systolic blood pressure (the top number) is 120-139 or your diastolic blood pressure (the bottom number) is 80-89, your blood pressure may be higher than it should be. You should have your blood pressure rechecked within a year by a primary care provider.  If your systolic blood pressure (the top number) is 140 or greater or your diastolic blood pressure (the bottom number) is 90 or greater, you may have high blood pressure. High blood pressure is treatable, but if left untreated over time it can put you at risk for heart attack, stroke, or kidney failure. You should have your blood pressure rechecked by a primary care provider within the next 4 weeks.  If your provider in the emergency department today gave you specific instructions to follow-up with your doctor or provider even sooner than that, you should follow that instruction and not wait for up to 4 weeks for your follow-up visit.        Thank you for choosing [facility]       Thank you for choosing [facility] for your care. Our goal is always to provide you with excellent care. Hearing back from our patients is one way we can continue to improve our services. Please take a few minutes to complete the written survey that you may receive in the mail after you visit with us. Thank you!        [facility] Information     [facility] gives you secure access to your electronic health record. If you see a primary care provider, you can also send messages to your care team and make appointments. If you have questions, please call your primary care clinic.  If you do not have a primary care provider, please call [phone] and they will assist you.        Care EveryWhere ID     This is your Care EveryWhere  ID. This could be used by other organizations to access your [facility] medical records  [id number]        Equal Access to Services     ZENON DIAMOND : Gabriel Collado, erick singh, mic brannon. So [facility] [place] [phone].    ATENCIÓN: Si habla español, tiene a singh disposición servicios gratuitos de asistencia lingüística. Llame al [phone].    We comply with applicable federal civil rights laws and [place] laws. We do not discriminate on the basis of race, color, national origin, age, disability, sex, sexual orientation, or gender identity.            After Visit Summary       This is your record. Keep this with you and show to your community pharmacist(s) and doctor(s) at your next visit.

## 2018-06-01 NOTE — ED AVS SNAPSHOT
[facility] Emergency Department    [address]    [place] [address]    Phone:  [phone]    Fax:  [phone]                                       Nevin Alvarado   MRN: [id number]    Department:  [facility] Emergency Department   Date of Visit:  6/1/2018           After Visit Summary Signature Page     I have received my discharge instructions, and my questions have been answered. I have discussed any challenges I see with this plan with the nurse or doctor.    ..........................................................................................................................................  Patient/Patient Representative Signature      ..........................................................................................................................................  Patient Representative Print Name and Relationship to Patient    ..................................................               ................................................  Date                                            Time    ..........................................................................................................................................  Reviewed by Signature/Title    ...................................................              ..............................................  Date                                                            Time

## 2018-06-01 NOTE — ED TRIAGE NOTES
Patient presents with complaints of swallowing a device to push back cuticles. Denies any SOB but states she is having some abdomen pain. Patient states that she intentionally swallowed it. ABC intact without need for intervention at this time.

## 2018-06-02 LAB — UPPER GI ENDOSCOPY: NORMAL

## 2018-06-02 NOTE — OR NURSING
Upper endoscopy/EGD performed on patient per Dr. Martinez for history of foreign body in stomach. Patient was given Propofol per ER/MD with Flying squad RN Becca assisting and monitoring patient during the procedure. The foreign body (Cuticle tool) was removed by Rat tooth instrument successfully and without difficulty. Patient remain stable throughout procedure with good respiratory effort and oxygenation. Monitoring of patient was passed on to ED nurse prior to exit of Endoscopy nurse.

## 2018-06-02 NOTE — PROCEDURES
Pre-Endoscopy History and Physical     Nevin Alvarado MRN# [id number]   YOB: 1990 Age: 27 year old     Date of Procedure: 6/1/2018  Primary care provider: Latonya Knight  Type of Endoscopy: esophagogastroduodenoscopy (upper GI endoscopy)  Reason for Procedure: esophageal foreign body  Type of Anesthesia Anticipated: MAC    HPI:    Nevin is a 27 year old female who will be undergoing the above procedure.      A history and physical has been performed. The patient's medications and allergies have been reviewed. The risks and benefits of the procedure and the sedation options and risks were discussed with the patient.  All questions were answered and informed consent was obtained.      Allergies   Allergen Reactions     Penicillins Anaphylaxis     Blood-Group Specific Substance Other (See Comments)     Patient has an anti-Cw and non-specific antibodies. Blood product orders may be delayed. Draw one red top and two purple top tubes for all type/screen/crossmatch orders.     Hydrocodone Nausea and Vomiting     vomiting for days     Influenza Vaccines Other (See Comments)     Oseltamivir Hives     med stopped, PN: med stopped     Phenergan [Promethazine] Itching     Vicodin [Hydrocodone-Acetaminophen] Nausea and Vomiting     Cephalosporins Rash     Lamotrigine Rash     Possibly associated with Lamictal.      Latex Rash        Current Facility-Administered Medications   Medication     fentaNYL (PF) (SUBLIMAZE) injection 50 mcg    Followed by     fentaNYL (PF) (SUBLIMAZE) injection 25 mcg     flumazenil (ROMAZICON) injection 0.2 mg     lidocaine (LMX4) kit     lidocaine 1 % 1 mL     midazolam (VERSED) injection 2 mg    Followed by     midazolam (VERSED) injection 1 mg     naloxone (NARCAN) injection 0.1-0.4 mg     propofol (DIPRIVAN) injection 10 mg/mL vial     sodium chloride (PF) 0.9% PF flush 3 mL     sodium chloride (PF) 0.9% PF flush 3 mL     sodium chloride 0.9% infusion     Current  Outpatient Prescriptions   Medication     Cholecalciferol (VITAMIN D3 PO)     CLONIDINE HCL PO     cyclobenzaprine (FLEXERIL) 10 MG tablet     Desvenlafaxine Succinate (PRISTIQ PO)     levonorgestrel (MIRENA) 20 MCG/24HR IUD     lurasidone (LATUDA) 20 MG TABS tablet     MELATONIN PO     ondansetron (ZOFRAN-ODT) 4 MG ODT tab       Patient Active Problem List   Diagnosis     Migraine without aura     Depression     Borderline personality disorder     Anxiety     H/O self-harm     ADD (attention deficit disorder)     PTSD (post-traumatic stress disorder)     Morbid obesity (H)     Rectal foreign body     Suicidal intent     Suicidal ideation     Syncope     Foreign body ingestion     Ingestion of foreign body     Major depressive disorder        Past Medical History:   Diagnosis Date     ADD (attention deficit disorder)      Anorexia nervosa with bulimia     history of; on Topamax     Anxiety      Borderline personality disorder      Depression      Depressive disorder      H/O self-harm      Lives in independent group home     due to debilitating mental illness     Migraine without aura     no known triggers; on Topamax bid and Imitrex PRN     Morbid obesity (H)      PTSD (post-traumatic stress disorder)      Rectal foreign body - Recurrent issue, self placed      Swallowed foreign body - Recurrent issue, self placed         Past Surgical History:   Procedure Laterality Date     ESOPHAGOSCOPY, GASTROSCOPY, DUODENOSCOPY (EGD), COMBINED N/A 3/9/2017    Procedure: COMBINED ESOPHAGOSCOPY, GASTROSCOPY, DUODENOSCOPY (EGD), REMOVE FOREIGN BODY;  Surgeon: Avis Guzmán MD;  Location:  OR     ESOPHAGOSCOPY, GASTROSCOPY, DUODENOSCOPY (EGD), COMBINED N/A 4/20/2017    Procedure: COMBINED ESOPHAGOSCOPY, GASTROSCOPY, DUODENOSCOPY (EGD), REMOVE FOREIGN BODY;  EGD removal Foregin body;  Surgeon: Lokesh Paula MD;  Location: [facility] OR     ESOPHAGOSCOPY, GASTROSCOPY, DUODENOSCOPY (EGD), COMBINED N/A 6/12/2017     Procedure: COMBINED ESOPHAGOSCOPY, GASTROSCOPY, DUODENOSCOPY (EGD);  COMBINED ESOPHAGOSCOPY, GASTROSCOPY, DUODENOSCOPY (EGD) [[id number]]attempted removal of foreign body;  Surgeon: Pamela Perez MD;  Location: [facility] OR     ESOPHAGOSCOPY, GASTROSCOPY, DUODENOSCOPY (EGD), COMBINED N/A 6/9/2017    Procedure: COMBINED ESOPHAGOSCOPY, GASTROSCOPY, DUODENOSCOPY (EGD), REMOVE FOREIGN BODY;  Esophagoscopy, Gastroscopy, Duodenoscopy, Removal of Foreign Body;  Surgeon: Dejon Marsh MD;  Location: [facility] OR     ESOPHAGOSCOPY, GASTROSCOPY, DUODENOSCOPY (EGD), COMBINED N/A 1/6/2018    Procedure: COMBINED ESOPHAGOSCOPY, GASTROSCOPY, DUODENOSCOPY (EGD), REMOVE FOREIGN BODY;  COMBINED ESOPHAGOSCOPY, GASTROSCOPY, DUODENOSCOPY (EGD) [by pascal net and snare with profol sedation;  Surgeon: Feliciano Emmanuel MD;  Location:  GI     ESOPHAGOSCOPY, GASTROSCOPY, DUODENOSCOPY (EGD), COMBINED N/A 3/19/2018    Procedure: COMBINED ESOPHAGOSCOPY, GASTROSCOPY, DUODENOSCOPY (EGD), REMOVE FOREIGN BODY;   Esophagodscopy, Gastroscopy, Duodenoscopy,Foreign Body Removal;  Surgeon: Brice Guzmán MD;  Location: [facility] OR     ESOPHAGOSCOPY, GASTROSCOPY, DUODENOSCOPY (EGD), COMBINED N/A 4/16/2018    Procedure: COMBINED ESOPHAGOSCOPY, GASTROSCOPY, DUODENOSCOPY (EGD), REMOVE FOREIGN BODY;  Esophagogastroduodenoscopy  Foreign Body Removal X 2;  Surgeon: Royer Moise MD;  Location: [facility] OR     EXAM UNDER ANESTHESIA ANUS N/A 1/10/2017    Procedure: EXAM UNDER ANESTHESIA ANUS;  Surgeon: Annmarie Haynes MD;  Location: [facility] OR     HC REMOVE FECAL IMPACTION OR FB W ANESTHESIA N/A 12/18/2016    Procedure: REMOVE FECAL IMPACTION/FOREIGN BODY UNDER ANESTHESIA;  Surgeon: Soham Cano MD;  Location: [facility] OR     KNEE SURGERY - removed a small tissue mass from knee Right      LAPAROSCOPIC ABLATION ENDOMETRIOSIS       LAPAROTOMY EXPLORATORY N/A 1/10/2017    Procedure: LAPAROTOMY EXPLORATORY;  Surgeon: Annmarie Haynes  "MD;  Location: [facility] OR     lymph nodes removed from neck; benign  age 7     MAMMOPLASTY REDUCTION Bilateral      RELEASE CARPAL TUNNEL Bilateral      SIGMOIDOSCOPY FLEXIBLE N/A 1/10/2017    Procedure: SIGMOIDOSCOPY FLEXIBLE;  Surgeon: Annmarie Haynes MD;  Location: [facility] OR     SIGMOIDOSCOPY FLEXIBLE N/A 5/8/2018    Procedure: SIGMOIDOSCOPY FLEXIBLE;  flex sig with foreign body removal using snare and rattooth forcep;  Surgeon: Soham Cano MD;  Location: [facility] GI       Social History   Substance Use Topics     Smoking status: Never Smoker     Smokeless tobacco: Never Used     Alcohol use No       Family History   Problem Relation Age of Onset     Type 2 Diabetes Maternal Grandmother      Type 2 Diabetes Paternal Grandmother      Breast Cancer Paternal Grandmother      CEREBROVASCULAR DISEASE Father 53     Myocardial Infarction No family hx of      Coronary Artery Disease Early Onset No family hx of      Ovarian Cancer No family hx of      Colon Cancer No family hx of             Medications:     (Not in a hospital admission)    Scheduled Medications:    fentaNYL  50 mcg Intravenous Once within 24 hrs     midazolam  2 mg Intravenous Once within 24 hrs     propofol  80 mg Intravenous Once     sodium chloride (PF)  3 mL Intracatheter Q8H       PRN:  fentaNYL **FOLLOWED BY** fentaNYL, flumazenil, lidocaine 4%, lidocaine (buffered or not buffered), midazolam **FOLLOWED BY** midazolam, naloxone, sodium chloride (PF)    PHYSICAL EXAM:   BP (!) 144/91  Pulse 74  Temp 97.7  F (36.5  C)  Resp 18  Ht 1.575 m (5' 2\")  Wt 112.9 kg (249 lb)  SpO2 98%  BMI 45.54 kg/m2 Estimated body mass index is 45.54 kg/(m^2) as calculated from the following:    Height as of this encounter: 1.575 m (5' 2\").    Weight as of this encounter: 112.9 kg (249 lb).   RESP: lungs clear to auscultation - no rales, rhonchi or wheezes  CV: regular rates and rhythm    IMPRESSION   ASA Class 2 - Mild systemic disease      Signed Electronically " by: Brice Martinez MD  June 1, 2018    .

## 2018-06-02 NOTE — ED PROVIDER NOTES
History     Chief Complaint:  Swallowed foreign body    HPI   Nevin Alvarado is a 27 year old female who presents for evaluation after swallowing a foreign body. The patient reports that approximately one hour prior to arrival here in the ED she intentionally swallowed a cuticle pusher. This is an extended metal device that has rounded edges. She states that she has a history of compulsively swallowing objects like this, and that she had not done this for several months prior to the last week in which she has done this several times. She states that last evening she broke a pair of sunglasses as well and swallowed the pieces. She has been seen here in the ED in the past after swallowing mickie pins as well. Upon arrival here this evening she states that this was not an attempt to harm herself. She has missed several days of her medications now, and has been feeling compulsions to swallow these objects. At this time she notes some epigastric pain along her diaphragm with inspiration, but otherwise denies any physical complaints.     Allergies:  Penicillins  Blood-group specific substance  Hydrocodone  Influenza vaccines  Oseltamivir  Phenergan  Vicodin  Cephalosporins  Lamotrigine     Medications:    Clonidine  Flexeril  Pristiq  Mirena  Latuda  Melatonin  Zofran    Past Medical History:    ADD  Anorexia nervosa with bulimia  Anxiety  Borderline personality disorder  Depressive disorder  History of self harm  Migraines  Morbid obesity  PTSD  Recurrent rectal and swallowed foreign bodies    Past Surgical History:    Esophagoscopy, gastroscopy, duodenoscopy, combined. x6  Knee surgery  Laparoscopic ablation endometriosis  Laparotomy  Sigmoidoscopy, flexible x2    Family History:    Cerebrovascular disease    Social History:  Smoking Status: No  Smokeless Tobacco: No  Alcohol Use: No  Marital Status:  Single      Review of Systems   Respiratory: Negative for cough and shortness of breath.    Gastrointestinal:  "Positive for abdominal pain.   All other systems reviewed and are negative.    Physical Exam   Vitals:  Patient Vitals for the past 24 hrs:   BP Temp Pulse Heart Rate Resp SpO2 Height Weight   06/01/18 2230 - - - 82 - 97 % - -   06/01/18 2210 (!) 134/91 - - 87 - 98 % - -   06/01/18 2200 140/89 - - 86 19 99 % - -   06/01/18 2145 (!) 147/115 - - 92 (!) 70 99 % - -   06/01/18 2127 - - - - - - 1.575 m (5' 2\") 112.9 kg (249 lb)   06/01/18 2045 (!) 144/91 - - - - 98 % - -   06/01/18 2030 (!) 137/101 - - - - 99 % - -   06/01/18 2008 - - - - - 97 % - -   06/01/18 2007 - - - - - 97 % - -   06/01/18 2006 - - - - - 99 % - -   06/01/18 2005 - - - - - 97 % - -   06/01/18 2003 - - - - - 97 % - -   06/01/18 2002 - - - - - 96 % - -   06/01/18 2001 - - - - - 98 % - -   06/01/18 2000 (!) 142/92 - - - - - - -   06/01/18 1930 151/86 - - - - - - -   06/01/18 1856 117/75 97.7  F (36.5  C) 74 74 18 99 % - -     Physical Exam  General: The patient is alert, in no respiratory distress.    HENT: Mucous membranes moist.    Cardiovascular: Regular rate and rhythm. Good pulses in all four extremities. Normal capillary refill and skin turgor.     Respiratory: Lungs are clear. No nasal flaring. No retractions. No wheezing, no crackles.    Gastrointestinal: Abdomen soft. No guarding, no rebound. No palpable hernias. No abdominal tenderness.    Musculoskeletal: No gross deformity.     Skin: No rashes or petechiae.     Neurologic: The patient is alert and oriented x3. GCS 15. No testable cranial nerve deficit. Follows commands with clear and appropriate speech. Gives appropriate answers. Good strength in all extremities. No gross neurologic deficit. Gross sensation intact. Pupils are round and reactive. No meningismus.     Lymphatic: No cervical adenopathy. No lower extremity swelling.    Psychiatric: The patient is non-tearful.    Emergency Department Course     Imaging:  Radiology findings were communicated with the patient who voiced " understanding of the findings.  Chest XR, PA & LAT:  No radiopaque foreign bodies are identified. No change.  Per Radiologist.     Abdomen XR, 2 views, flat and upright:  There is a 10 cm linear metallic foreign body in the upper abdomen that is likely a wire or pin type foreign body. Bowel gas pattern is normal. No free air. Elevated right hemidiaphragm.  Per Radiologist.     Laboratory:  Laboratory findings were communicated with the patient who voiced understanding of the findings.  UA: Mucous: Present (A)  HCG Qual: Negative    Procedures:  [facility] Procedure Note      Sedation:      Performed by: Campbell Encarnacion MD  Authorized by: Campbell Encarnacion MD    Pre-Procedure Assessment done at 1900.    Expected Level:  Deep Sedation    Indication:  Sedation is required to allow for Foreign body removal    Consent obtained from patient after discussing the risks, benefits and alternatives.    PO Intake:  Greater than 4 hours    ASA Class:  Class 1 - HEALTHY PATIENT    Mallampati:  Grade 1:  Soft palate, uvula, tonsillar pillars, and posterior pharyngeal wall visible    Lungs: Lungs Clear with good breath sounds bilaterally.     Heart: Normal heart sounds and rate    History and physical reviewed and no updates needed. I have reviewed the lab findings, diagnostic data, medications, and the plan for sedation. I have determined this patient to be an appropriate candidate for the planned sedation and procedure and have reassessed the patient IMMEDIATELY PRIOR to sedation and procedure.      Sedation Post Procedure Summary:    Prior to the start of the procedure and with procedural staff participation, I verbally confirmed the patient s identity using two indicators, relevant allergies, that the procedure was appropriate and matched the consent or emergent situation, and that the correct equipment/implants were available. Immediately prior to starting the procedure I conducted the Time Out with the  procedural staff and re-confirmed the patient s name, procedure, and site/side. (The Joint Commission universal protocol was followed.)  Yes      Sedatives: Propofol    Vital signs, airway, End Tidal CO2 and pulse oximetry were monitored and remained stable throughout the procedure and sedation was maintained until the procedure was complete.  The patient was monitored by staff until sedation discharge criteria were met.    Patient tolerance: Patient tolerated the procedure well with no immediate complications.    Time of sedation in minutes:  15 minutes from beginning to end of physician one to one monitoring.    Interventions:  2151 Propofol 160 mg, IV     Emergency Department Course:  Nursing notes and vitals reviewed.  1915 I had my initial encounter with the patient.  I performed an exam of the patient as documented above.   2029 I spoke with Dr. Martinez of GI regarding this patient. He will come to the ED to remove the foreign body.  2120 Dr. Martinez arrived here in the ED.  2140 I performed the procedure as noted above.  2234 Upon recheck the patient is talking on the phone and ready to be discharged.    2241 I discussed the treatment plan with the patient. They expressed understanding of this plan and consented to discharge. They will be discharged home with instructions for care and follow up. In addition, the patient will return to the emergency department with any new or worsening symptoms.  All questions were answered.    Impression & Plan      Medical Decision Making:  This patient has a long history of swallowing foreign bodies. She says she has been off of her medication and this makes her compulsion worse. She says that she had swallowed some sunglasses that had broken, though this was not seen on either chest x ray or abdominal. The cuticle remover that she had swallowed was quite long, 10 cm. I discussed the case with Dr. Martinez who did come in to extract this, and this went quite well. There were no  problems with the sedation which I performed myself. There were no signs of any bleeding, which I visualized on the camera. And post procedure she was doing well. She was discharged home with strict instructions to take her medication. She denies trying to hurt herself, and was discharged in good condition.     Diagnosis:    ICD-10-CM    1. Swallowed foreign body, initial encounter T18.9XXA    2. Abdominal pain epigastric    Disposition:   Discharge    Scribe Disclosure:  I, Hugo Mehta, am serving as a scribe at 7:17 PM on 6/1/2018 to document services personally performed by Campbell Encarnacion MD, based on my observations and the provider's statements to me.  6/1/2018   [facility] EMERGENCY DEPARTMENT       Campbell Encarnacion MD  06/02/18 7609     yes

## 2018-06-02 NOTE — DISCHARGE INSTRUCTIONS
Swallowed Foreign Body (Adult)  Indigestible objects (foreign bodies) are sometimes swallowed by adults. Whether or not the object moves all the way through the digestive tract depends on many factors. This includes the size and shape of the object, whether the object is sharp and pointy, and what the object is made of.  Based on your evaluation, no treatment is needed at this time. The swallowed object is expected to move through your digestive tract and pass out of the body in the stool with no problems. This may take about 24 to 48 hours, but could take longer depending on your bowel habits. If imaging tests were done, you will be told when the results are ready and if they affect your treatment.  Home care    Follow any instructions from your provider about eating and drinking. In certain cases, you may be told to only eat soft foods and drink liquids for the first 24 to 48 hours.    You will need to check your stool each time you have a bowel movement. This is so you can confirm that the object has passed, and look for signs of bleeding. If the object does not pass, it may mean that the object is stuck somewhere along the digestive tract. In such cases, the object may need to be removed with a procedure.  Follow-up care  Follow up with your healthcare provider as advised. You will be told if further treatment is needed. In certain cases, you may need to return to have imaging tests done. Call your healthcare provider if you have any questions or concerns.  When to seek medical advice  Call your healthcare provider right away if any of these occur:    Belly pain, cramps, or swelling    Shortness of breath or coughing that won t stop    Trouble swallowing or pain with swallowing    Vomiting that won t stop    Blood in the stool (dark red or black color)    Fever of 100.4 F (38 C) or higher, or as directed by your provider  Call 911  Call 911 if any of these occur:    Trouble breathing, wheezing    Trouble  speaking    Unusually fast heart rate    New or worsening chest pain    Vomiting blood (red or black)    Swelling of the neck    Inability to pass stool  Date Last Reviewed: 8/1/2017 2000-2017 [employer]. [address] [place] [address], [place] [address]. All rights reserved. This information is not intended as a substitute for professional medical care. Always follow your healthcare professional's instructions.

## 2018-06-02 NOTE — CONSULTS
Gastroenterology Consultation      Nevin Alvarado MRN# [id number]   YOB: 1990 Age: 27 year old   Date of Admission: 6/1/2018     Reason for consult: I was asked by Dr Encarnacion to evaluate this patient for a swallowed foreign body.               History of Present Illness:   This patient is a 27 year old female who presents after swallowing a cuticle pusher, about 10 cm.  She has a history of multiple foreign body ingestions and swallowed part of her glasses earlier this week, but at EGD, the object had advanced beyond reach of the endoscope.  She ate lunch and swallowed the object about 6:00.  She did not feel any trauma to the esophagus.  She has had LUQ pain since, worse with inspiration.  X-ray shows the object in the LUQ.  She has no CP, SOB or other symptoms now.              Past Medical History:     Past Medical History:   Diagnosis Date     ADD (attention deficit disorder)      Anorexia nervosa with bulimia     history of; on Topamax     Anxiety      Borderline personality disorder      Depression      Depressive disorder      H/O self-harm      Lives in independent group home     due to debilitating mental illness     Migraine without aura     no known triggers; on Topamax bid and Imitrex PRN     Morbid obesity (H)      PTSD (post-traumatic stress disorder)      Rectal foreign body - Recurrent issue, self placed      Swallowed foreign body - Recurrent issue, self placed              Past Surgical History:     Past Surgical History:   Procedure Laterality Date     ESOPHAGOSCOPY, GASTROSCOPY, DUODENOSCOPY (EGD), COMBINED N/A 3/9/2017    Procedure: COMBINED ESOPHAGOSCOPY, GASTROSCOPY, DUODENOSCOPY (EGD), REMOVE FOREIGN BODY;  Surgeon: Avis Guzmán MD;  [facility]     ESOPHAGOSCOPY, GASTROSCOPY, DUODENOSCOPY (EGD), COMBINED N/A 4/20/2017    Procedure: COMBINED ESOPHAGOSCOPY, GASTROSCOPY, DUODENOSCOPY (EGD), REMOVE FOREIGN BODY;  EGD removal Foregin body;  Surgeon: Chai  Lokesh Gonzalez MD;  [facility]     ESOPHAGOSCOPY, GASTROSCOPY, DUODENOSCOPY (EGD), COMBINED N/A 6/12/2017    Procedure: COMBINED ESOPHAGOSCOPY, GASTROSCOPY, DUODENOSCOPY (EGD);  COMBINED ESOPHAGOSCOPY, GASTROSCOPY, DUODENOSCOPY (EGD) [[id number]]attempted removal of foreign body;  Surgeon: Pamela Perez MD;  [facility]     ESOPHAGOSCOPY, GASTROSCOPY, DUODENOSCOPY (EGD), COMBINED N/A 6/9/2017    Procedure: COMBINED ESOPHAGOSCOPY, GASTROSCOPY, DUODENOSCOPY (EGD), REMOVE FOREIGN BODY;  Esophagoscopy, Gastroscopy, Duodenoscopy, Removal of Foreign Body;  Surgeon: Dejon Marsh MD;  [facility]     ESOPHAGOSCOPY, GASTROSCOPY, DUODENOSCOPY (EGD), COMBINED N/A 1/6/2018    Procedure: COMBINED ESOPHAGOSCOPY, GASTROSCOPY, DUODENOSCOPY (EGD), REMOVE FOREIGN BODY;  COMBINED ESOPHAGOSCOPY, GASTROSCOPY, DUODENOSCOPY (EGD) [by pascal net and snare with profol sedation;  Surgeon: Feliciano Emmanuel MD;  Location: [facility]     ESOPHAGOSCOPY, GASTROSCOPY, DUODENOSCOPY (EGD), COMBINED N/A 3/19/2018    Procedure: COMBINED ESOPHAGOSCOPY, GASTROSCOPY, DUODENOSCOPY (EGD), REMOVE FOREIGN BODY;   Esophagodscopy, Gastroscopy, Duodenoscopy,Foreign Body Removal;  Surgeon: Brice Guzmán MD;  [facility]     ESOPHAGOSCOPY, GASTROSCOPY, DUODENOSCOPY (EGD), COMBINED N/A 4/16/2018    Procedure: COMBINED ESOPHAGOSCOPY, GASTROSCOPY, DUODENOSCOPY (EGD), REMOVE FOREIGN BODY;  Esophagogastroduodenoscopy  Foreign Body Removal X 2;  Surgeon: Royer Moise MD;  [facility]     EXAM UNDER ANESTHESIA ANUS N/A 1/10/2017    Procedure: EXAM UNDER ANESTHESIA ANUS;  Surgeon: Annmarie Haynes MD;  [facility]     HC REMOVE FECAL IMPACTION OR FB W ANESTHESIA N/A 12/18/2016    Procedure: REMOVE FECAL IMPACTION/FOREIGN BODY UNDER ANESTHESIA;  Surgeon: Soham Cano MD;  Location: [facility]     KNEE SURGERY - removed a small tissue mass from knee Right      LAPAROSCOPIC ABLATION ENDOMETRIOSIS        LAPAROTOMY EXPLORATORY N/A 1/10/2017    Procedure: LAPAROTOMY EXPLORATORY;  Surgeon: Annmarie Haynes MD;  [facility]     lymph nodes removed from neck; benign  age 7     MAMMOPLASTY REDUCTION Bilateral      RELEASE CARPAL TUNNEL Bilateral      SIGMOIDOSCOPY FLEXIBLE N/A 1/10/2017    Procedure: SIGMOIDOSCOPY FLEXIBLE;  Surgeon: Annmarie Haynes MD;  [facility]     SIGMOIDOSCOPY FLEXIBLE N/A 5/8/2018    Procedure: SIGMOIDOSCOPY FLEXIBLE;  flex sig with foreign body removal using snare and rattooth forcep;  Surgeon: Soham Cano MD;  Location: [facility]               Social History:     Social History     Social History     Marital status: Single     Spouse name: N/A     Number of children: N/A     Years of education: N/A     Occupational History     On disability      Social History Main Topics     Smoking status: Never Smoker     Smokeless tobacco: Never Used     Alcohol use No     Drug use: No     Sexual activity: Yes     Partners: Male     Birth control/ protection: IUD      Comment: IUD - Mirena - placed July, 2015     Other Topics Concern     Not on file     Social History Narrative    Single.    Living in independent living portion of [employer].    On disability.    No regular exercise.              Family History:     Family History   Problem Relation Age of Onset     Type 2 Diabetes Maternal Grandmother      Type 2 Diabetes Paternal Grandmother      Breast Cancer Paternal Grandmother      CEREBROVASCULAR DISEASE Father 53     Myocardial Infarction No family hx of      Coronary Artery Disease Early Onset No family hx of      Ovarian Cancer No family hx of      Colon Cancer No family hx of              Allergies:      Allergies   Allergen Reactions     Penicillins Anaphylaxis     Blood-Group Specific Substance Other (See Comments)     Patient has an anti-Cw and non-specific antibodies. Blood product orders may be delayed. Draw one red top and two purple top tubes for  "all type/screen/crossmatch orders.     Hydrocodone Nausea and Vomiting     vomiting for days     Influenza Vaccines Other (See Comments)     Oseltamivir Hives     med stopped, PN: med stopped     Phenergan [Promethazine] Itching     Vicodin [Hydrocodone-Acetaminophen] Nausea and Vomiting     Cephalosporins Rash     Lamotrigine Rash     Possibly associated with Lamictal.      Latex Rash             Medications:     Current Outpatient Prescriptions   Medication Sig Dispense Refill     Cholecalciferol (VITAMIN D3 PO) Take 2,000 Units by mouth every morning        CLONIDINE HCL PO Take 0.1 mg by mouth 2 times daily       cyclobenzaprine (FLEXERIL) 10 MG tablet Take 10 mg by mouth 3 times daily as needed       Desvenlafaxine Succinate (PRISTIQ PO) Take 100 mg by mouth every morning        levonorgestrel (MIRENA) 20 MCG/24HR IUD 1 each (20 mcg) by Intrauterine route once for 1 dose       lurasidone (LATUDA) 20 MG TABS tablet Take 2 tablets (40 mg) by mouth every evening 12 tablet 0     MELATONIN PO Take 3 mg by mouth nightly as needed (sleep)       ondansetron (ZOFRAN-ODT) 4 MG ODT tab Take 4 mg by mouth every 6 hours as needed               Review of Systems:   10-point review of systems negative except as noted in HPI.            Physical Exam:   Vitals were reviewed  BP (!) 144/91  Pulse 74  Temp 97.7  F (36.5  C)  Resp 18  Ht 1.575 m (5' 2\")  Wt 112.9 kg (249 lb)  SpO2 98%  BMI 45.54 kg/m2  Constitutional:   No distress     Heent:   NC/AT, sclera anicteric     Neck:   No adenopathy, thyroid not palpable   Respiratory:   CTA anteriorly     Cardiovascular:   RRR, no murmur     Gastrointestinal:   Active BS, soft, minimal tenderness LUQ     Extremities:   No clubbing, cyanosis or edema   Neuro:   Grossly nonfocal            Data:     ROUTINE IP LABS  BMP  Last Basic Metabolic Panel:  Lab Results   Component Value Date     04/16/2018      Lab Results   Component Value Date    POTASSIUM 4.1 04/16/2018     Lab " Results   Component Value Date    CHLORIDE 106 04/16/2018     Lab Results   Component Value Date    KELBY 9.6 04/16/2018     Lab Results   Component Value Date    CO2 24 04/16/2018     Lab Results   Component Value Date    BUN 10 04/16/2018     Lab Results   Component Value Date    CR 0.58 04/16/2018     Lab Results   Component Value Date     04/16/2018       CBC  Lab Results   Component Value Date    WBC 9.7 04/16/2018     Lab Results   Component Value Date    RBC 4.47 04/16/2018     Lab Results   Component Value Date    HGB 12.8 04/16/2018     Lab Results   Component Value Date    HCT 39.3 04/16/2018     No components found for: MCT  Lab Results   Component Value Date    MCV 88 04/16/2018     Lab Results   Component Value Date    MCH 28.6 04/16/2018     Lab Results   Component Value Date    MCHC 32.6 04/16/2018     Lab Results   Component Value Date    RDW 14.5 04/16/2018     Lab Results   Component Value Date     04/16/2018       INR  Lab Results   Component Value Date    INR 1.03 03/19/2018            Assessment and Plan:   Swallowed esophageal foreign body.  Object likely is still in the stomach.  Will perform endoscopic removal with propofol sedation.     Rodri Martinez MD  [facility]  Office:  [phone]

## 2018-06-10 ENCOUNTER — APPOINTMENT (OUTPATIENT)
Dept: GENERAL RADIOLOGY | Facility: CLINIC | Age: 28
End: 2018-06-10
Attending: NURSE PRACTITIONER
Payer: MEDICARE

## 2018-06-10 ENCOUNTER — HOSPITAL ENCOUNTER (EMERGENCY)
Facility: CLINIC | Age: 28
Discharge: HOME | End: 2018-06-10
Attending: NURSE PRACTITIONER | Admitting: NURSE PRACTITIONER
Payer: MEDICARE

## 2018-06-10 VITALS
OXYGEN SATURATION: 95 % | DIASTOLIC BLOOD PRESSURE: 88 MMHG | RESPIRATION RATE: 19 BRPM | SYSTOLIC BLOOD PRESSURE: 135 MMHG | TEMPERATURE: 98.4 F

## 2018-06-10 DIAGNOSIS — T50.904A DRUG OVERDOSE, UNDETERMINED INTENT, INITIAL ENCOUNTER: ICD-10-CM

## 2018-06-10 DIAGNOSIS — F43.9 STRESS: ICD-10-CM

## 2018-06-10 LAB
ALBUMIN UR-MCNC: NEGATIVE MG/DL
AMPHETAMINES UR QL SCN: NEGATIVE
ANION GAP SERPL CALCULATED.3IONS-SCNC: 7 MMOL/L (ref 3–14)
APAP SERPL-MCNC: <2 MG/L (ref 10–20)
APPEARANCE UR: CLEAR
BACTERIA #/AREA URNS HPF: ABNORMAL /HPF
BARBITURATES UR QL: NEGATIVE
BASOPHILS # BLD AUTO: 0 10E9/L (ref 0–0.2)
BASOPHILS NFR BLD AUTO: 0.4 %
BENZODIAZ UR QL: NEGATIVE
BILIRUB UR QL STRIP: NEGATIVE
BUN SERPL-MCNC: 12 MG/DL (ref 7–30)
CALCIUM SERPL-MCNC: 9.2 MG/DL (ref 8.5–10.1)
CANNABINOIDS UR QL SCN: NEGATIVE
CHLORIDE SERPL-SCNC: 107 MMOL/L (ref 94–109)
CO2 SERPL-SCNC: 27 MMOL/L (ref 20–32)
COCAINE UR QL: NEGATIVE
COLOR UR AUTO: ABNORMAL
CREAT SERPL-MCNC: 0.65 MG/DL (ref 0.52–1.04)
DIFFERENTIAL METHOD BLD: NORMAL
EOSINOPHIL # BLD AUTO: 0.1 10E9/L (ref 0–0.7)
EOSINOPHIL NFR BLD AUTO: 1.6 %
ERYTHROCYTE [DISTWIDTH] IN BLOOD BY AUTOMATED COUNT: 13.9 % (ref 10–15)
GFR SERPL CREATININE-BSD FRML MDRD: >90 ML/MIN/1.7M2
GLUCOSE SERPL-MCNC: 104 MG/DL (ref 70–99)
GLUCOSE UR STRIP-MCNC: NEGATIVE MG/DL
HCG UR QL: NEGATIVE
HCT VFR BLD AUTO: 37.2 % (ref 35–47)
HGB BLD-MCNC: 12.3 G/DL (ref 11.7–15.7)
HGB UR QL STRIP: NEGATIVE
IMM GRANULOCYTES # BLD: 0 10E9/L (ref 0–0.4)
IMM GRANULOCYTES NFR BLD: 0.4 %
KETONES UR STRIP-MCNC: NEGATIVE MG/DL
LEUKOCYTE ESTERASE UR QL STRIP: NEGATIVE
LYMPHOCYTES # BLD AUTO: 1.3 10E9/L (ref 0.8–5.3)
LYMPHOCYTES NFR BLD AUTO: 16.7 %
MCH RBC QN AUTO: 29.1 PG (ref 26.5–33)
MCHC RBC AUTO-ENTMCNC: 33.1 G/DL (ref 31.5–36.5)
MCV RBC AUTO: 88 FL (ref 78–100)
MONOCYTES # BLD AUTO: 0.6 10E9/L (ref 0–1.3)
MONOCYTES NFR BLD AUTO: 7.9 %
MUCOUS THREADS #/AREA URNS LPF: PRESENT /LPF
NEUTROPHILS # BLD AUTO: 5.6 10E9/L (ref 1.6–8.3)
NEUTROPHILS NFR BLD AUTO: 73 %
NITRATE UR QL: NEGATIVE
NRBC # BLD AUTO: 0 10*3/UL
NRBC BLD AUTO-RTO: 0 /100
OPIATES UR QL SCN: NEGATIVE
PCP UR QL SCN: NEGATIVE
PH UR STRIP: 5 PH (ref 5–7)
PLATELET # BLD AUTO: 305 10E9/L (ref 150–450)
POTASSIUM SERPL-SCNC: 3.5 MMOL/L (ref 3.4–5.3)
RBC # BLD AUTO: 4.22 10E12/L (ref 3.8–5.2)
RBC #/AREA URNS AUTO: 1 /HPF (ref 0–2)
SALICYLATES SERPL-MCNC: <2 MG/DL
SODIUM SERPL-SCNC: 141 MMOL/L (ref 133–144)
SOURCE: ABNORMAL
SP GR UR STRIP: 1.01 (ref 1–1.03)
SQUAMOUS #/AREA URNS AUTO: <1 /HPF (ref 0–1)
UROBILINOGEN UR STRIP-MCNC: 0 MG/DL (ref 0–2)
WBC # BLD AUTO: 7.7 10E9/L (ref 4–11)
WBC #/AREA URNS AUTO: 1 /HPF (ref 0–5)

## 2018-06-10 PROCEDURE — 71046 X-RAY EXAM CHEST 2 VIEWS: CPT

## 2018-06-10 PROCEDURE — 85025 COMPLETE CBC W/AUTO DIFF WBC: CPT | Performed by: NURSE PRACTITIONER

## 2018-06-10 PROCEDURE — 81001 URINALYSIS AUTO W/SCOPE: CPT | Performed by: NURSE PRACTITIONER

## 2018-06-10 PROCEDURE — 96374 THER/PROPH/DIAG INJ IV PUSH: CPT

## 2018-06-10 PROCEDURE — 80329 ANALGESICS NON-OPIOID 1 OR 2: CPT | Performed by: NURSE PRACTITIONER

## 2018-06-10 PROCEDURE — 93005 ELECTROCARDIOGRAM TRACING: CPT

## 2018-06-10 PROCEDURE — 25000128 H RX IP 250 OP 636: Performed by: NURSE PRACTITIONER

## 2018-06-10 PROCEDURE — 81025 URINE PREGNANCY TEST: CPT | Performed by: NURSE PRACTITIONER

## 2018-06-10 PROCEDURE — 80048 BASIC METABOLIC PNL TOTAL CA: CPT | Performed by: NURSE PRACTITIONER

## 2018-06-10 PROCEDURE — 99285 EMERGENCY DEPT VISIT HI MDM: CPT | Mod: 25

## 2018-06-10 PROCEDURE — 96361 HYDRATE IV INFUSION ADD-ON: CPT

## 2018-06-10 PROCEDURE — 90791 PSYCH DIAGNOSTIC EVALUATION: CPT

## 2018-06-10 PROCEDURE — 80307 DRUG TEST PRSMV CHEM ANLYZR: CPT | Performed by: NURSE PRACTITIONER

## 2018-06-10 PROCEDURE — 74018 RADEX ABDOMEN 1 VIEW: CPT

## 2018-06-10 PROCEDURE — 70360 X-RAY EXAM OF NECK: CPT

## 2018-06-10 RX ORDER — ONDANSETRON HYDROCHLORIDE 4 MG/5ML
4 SOLUTION ORAL ONCE
Status: DISCONTINUED | OUTPATIENT
Start: 2018-06-10 | End: 2018-06-10 | Stop reason: HOSPADM

## 2018-06-10 RX ORDER — SODIUM CHLORIDE 9 MG/ML
1000 INJECTION, SOLUTION INTRAVENOUS CONTINUOUS
Status: DISCONTINUED | OUTPATIENT
Start: 2018-06-10 | End: 2018-06-10 | Stop reason: HOSPADM

## 2018-06-10 RX ORDER — ONDANSETRON 2 MG/ML
4 INJECTION INTRAMUSCULAR; INTRAVENOUS ONCE
Status: COMPLETED | OUTPATIENT
Start: 2018-06-10 | End: 2018-06-10

## 2018-06-10 RX ADMIN — SODIUM CHLORIDE 1000 ML: 9 INJECTION, SOLUTION INTRAVENOUS at 15:30

## 2018-06-10 RX ADMIN — ONDANSETRON 4 MG: 2 INJECTION INTRAMUSCULAR; INTRAVENOUS at 15:30

## 2018-06-10 NOTE — ED PROVIDER NOTES
History     Chief Complaint:  Ingestion    HPI   Nevin Alvarado is a 27 year old female with a history of borderline personality disorder and self injurious behavior who presents following an ingestion. The patient reports that she took 21 tablets of benadryl approximately one hour ago. She states that she did this due to an increase in stress recent;y, specifically due to having gotten an eviction notice. The patient states that she regrets doing this and denies currently having any homicidal or suicidal ideation. She denies any other co ingestants or having harmed herself in any other way today. The patient denies any drug or alcohol use.     Allergies:  Penicillins  Blood-group specific substance  Hydrocodone  Influenza vaccines  Tamiflu  Phenergan  Vicodin   Cephalosporins   Lamotrigine  Latex    Medications:    Clonidine  Flexeril  Pristiq  Mirena  Melatonin  Latuda    Past Medical History:    Add  Anorexia nervosa with bulimia  Anxiety  Borderline person ility disorder  Depression  Self harm  Lives in independent group home  Migraine without aura  Morbid obesity  PTSD  Rectal foreign body, recurrent issue  Swallowed foreign body, recurrent issue    Past Surgical History:    EGD x 8  Exam anus under anesthesia  Remove fecal impaction  Knee surgery  Laparoscopic ablation endometriosis  Laparotomy exploratory  Lymph nodes removed  Mammoplasty reduction  Release carpal tunnel  Sigmoidoscopy flexible x2    Family History:    Cerebrovascular disease     Social History:  Smoking Status: No  Smokeless Tobacco: No  Alcohol Use: No   Marital Status:  Single [1]    Review of Systems   Psychiatric/Behavioral: Negative for suicidal ideas.   All other systems reviewed and are negative.    Physical Exam   Vitals:  Patient Vitals for the past 24 hrs:   BP Temp Temp src Heart Rate Resp SpO2   06/10/18 1800 135/88 - - 95 19 95 %   06/10/18 1745 136/85 - - 88 19 97 %   06/10/18 1730 136/88 - - 92 15 95 %   06/10/18 1715  141/89 - - 96 19 97 %   06/10/18 1700 136/85 - - 94 19 98 %   06/10/18 1645 - - - - - 96 %   06/10/18 1615 - - - 97 14 98 %   06/10/18 1545 145/88 - - - - -   06/10/18 1544 - - - 93 16 97 %   06/10/18 1500 - - - 90 19 97 %   06/10/18 1445 - - - 91 20 99 %   06/10/18 1430 (!) 145/99 - - 83 19 97 %   06/10/18 1401 (!) 145/99 98.4  F (36.9  C) Temporal 93 18 98 %        Physical Exam  General: Upon entering room patient is awake, alert and playing on cell phone  HENT: Atraumatic, Airway is patent. No FO evident.  No stridor, managing oral secretions.  Eyes: Pupils 3 mm equal and reactive. No scleral injection. No nystagmus.   Neck: Supple with normal ROM  Cardio: Regular rate and rhythm, no murmurs, rubs, or gallops  Pulmonary/Chest: Clear to ausculation bilaterally.    Abdomen: Soft, non-tender, normal bowel sounds, no rebound or guarding  Musculoskeletal: Normal gait.   Neuro: Alert and oriented, normal speech, no focal deficits noted.   Skin: Normal color and temperature, no rashes.  Psych: Mood and affect normal. Speech non-pressured.    Emergency Department Course     ECG:  ECG taken at 1426, ECG read at 1643  Normal sinus rhythm. Cannot rule out anterior infarct, age undetermined. Abnormal ECG. No significant change as compared to 11/5/17  Rate 85 bpm. NY interval 152. QRS duration 84. QT/QTc 360/428. P-R-T axes 29, 54, 3.     Laboratory:  Laboratory findings were communicated with the patient who voiced understanding of the findings.  UA: Bacteria: few, Mucous: present  HCG qualitative: Negative  Drug abuse screen: Negative  CBC: AWNL (WBC 7.7, HGB 12.3, )  BMP: Glucose: 104(H), o/w WNL (Creatinine 0.65)   Acetaminophen level: <2  Salicylate level: <2    Interventions:  1530 Normal Saline 1000 mL IV   1530 Zofran 4 mg IV     Emergency Department Course:  Nursing notes and vitals reviewed.  I performed an exam of the patient as documented above.   The patient provided a urine sample here in the emergency  "department. This was sent for laboratory testing, findings above.  EKG obtained in the ED, see results above.      1424 I spoke with Poison Control regarding the patient    1527 I rechecked with the patient who is currently wide awake in the room    1604 I rechecked with the patient who reports \"I just swallowed the thermometer probe\" she states that she can feel this in her esophagus. She denies doing this to hurt herself but states that she \"does not know why she did this\". She requested that we remove everything from the room which she could swallow.     1609 We ordered a sitter for the patient.    1614 The patient somehow extracted a portion of the glass thermometer probe that she had swallowed. She will still require imaging as there is still a missing piece.    1629 I spoke with our DEC  who will see the patient when she returns from XR    1740 I spoke with our DEC  regarding the patient    Findings and plan explained to the Patient. Patient discharged home with instructions regarding supportive care, medications, and reasons to return. The importance of close follow-up was reviewed.      I personally reviewed the laboratory results with the patient and answered all related questions prior to discharge.    Impression & Plan      Medical Decision Making:  Nevin Alvarado is a 27 year old female with a history of borderline personality disorder, ingestion, and self harm presents to [facility] [place] [facility] for evaluation after an ingestion of Benadryl. The patient reports that she took 21 tablets of Benadryl one hour prior to arrival to the ED.   The workup in the Emergency Room at this time is negative for life-threatening or concerning complication of the ingestion.  I did a broad workup here to make sure I was not missing a co-ingestion that could be missed and I do not believe a significant co-ingestion is present.  Given no suicidal plan/intent, will plan on discharge " home.Patient is able to contract for safety.  Poison control was contacted and aware. Patient spoke with DEC who set her up with a new psychiatrist and notes that she has an appointment with her current psychiatrist for tomorrow.     Diagnosis:    ICD-10-CM    1. Drug overdose, undetermined intent, initial encounter T50.904A CBC with platelets differential     Basic metabolic panel     UA with Microscopic     HCG qualitative urine (UPT)     Drug abuse screen 77 urine     Acetaminophen level     Salicylate level   2. Stress F43.9         Disposition:   Discharged    CMS Diagnoses: None     Scribe Disclosure:  David MURRAY, am serving as a scribe at 2:14 PM on 6/10/2018 to document services personally performed by Edda Munoz, HU GARCIA, based on my observations and the provider's statements to me.   [facility] EMERGENCY DEPARTMENT       Edda Munoz APRN CNP  06/10/18 7050

## 2018-06-10 NOTE — ED NOTES
Patient put on call light. Patient coughing at bedside. Patient took a plastic temperature probe and swallowed it. Patient coughing, and having trouble breathing. MD informed. X-rays placed. All bedside room items taken from room.

## 2018-06-10 NOTE — ED AVS SNAPSHOT
[facility] Emergency Department    [address]    [facility] [address]    Phone:  [phone]    Fax:  [phone]                                       Nevin Alvarado   MRN: [id number]    Department:  [facility] Emergency Department   Date of Visit:  6/10/2018           After Visit Summary Signature Page     I have received my discharge instructions, and my questions have been answered. I have discussed any challenges I see with this plan with the nurse or doctor.    ..........................................................................................................................................  Patient/Patient Representative Signature      ..........................................................................................................................................  Patient Representative Print Name and Relationship to Patient    ..................................................               ................................................  Date                                            Time    ..........................................................................................................................................  Reviewed by Signature/Title    ...................................................              ..............................................  Date                                                            Time

## 2018-06-10 NOTE — ED TRIAGE NOTES
"Pt arrives for overdose. Pt states she took between 500-600 mg of benadryl. When asked pt if she was tyring to hurt herself, pt states \"no, I don't know what I was thinking. I found out I am being evicted and I am under a lot of stress\". ABCs intact.   "

## 2018-06-10 NOTE — ED AVS SNAPSHOT
[facility] Emergency Department    [address] [place] [address]    Phone:  [phone]    Fax:  [phone]                                       Nevin Alvarado   MRN: [id number]    Department:  [facility] Emergency Department   Date of Visit:  6/10/2018           Patient Information     Date Of Birth          1990        Your diagnoses for this visit were:     Drug overdose, undetermined intent, initial encounter     Stress        You were seen by Edda Munoz APRN CNP.      Follow-up Information     Follow up with Latonya Knight MD In 1 day.    Specialty:  Family Practice    Contact information:    [place] [id number] [phone]        Discharge References/Attachments     POISONING: FIRST AID (ENGLISH)    STRESS, IDENTIFYING CAUSES OF (ENGLISH)      24 Hour Appointment Hotline       To make an appointment at any [facility], call [phone] ([phone]). If you don't have a family doctor or clinic, we will help you find one. [facility] clinics are conveniently located to serve the needs of you and your family.             Review of your medicines      Our records show that you are taking the medicines listed below. If these are incorrect, please call your family doctor or clinic.        Dose / Directions Last dose taken    CLONIDINE HCL PO   Dose:  0.1 mg        Take 0.1 mg by mouth 2 times daily   Refills:  0        cyclobenzaprine 10 MG tablet   Commonly known as:  FLEXERIL   Dose:  10 mg        Take 10 mg by mouth 3 times daily as needed   Refills:  0        levonorgestrel 20 MCG/24HR IUD   Commonly known as:  MIRENA   Dose:  1 each        1 each (20 mcg) by Intrauterine route once for 1 dose   Refills:  0        lurasidone 20 MG Tabs tablet   Commonly known as:  LATUDA   Dose:  40 mg   Quantity:  12 tablet        Take 2 tablets (40 mg) by mouth every evening   Refills:  0        MELATONIN PO    Dose:  3 mg        Take 3 mg by mouth nightly as needed (sleep)   Refills:  0        ondansetron 4 MG ODT tab   Commonly known as:  ZOFRAN-ODT   Dose:  4 mg        Take 4 mg by mouth every 6 hours as needed   Refills:  0        PRISTIQ PO   Dose:  100 mg        Take 100 mg by mouth every morning   Refills:  0        VITAMIN D3 PO   Dose:  2000 Units        Take 2,000 Units by mouth every morning   Refills:  0                Procedures and tests performed during your visit     Acetaminophen level    Basic metabolic panel    CBC with platelets differential    Chest XR,  PA & LAT    Drug abuse screen 77 urine    EKG 12 lead    HCG qualitative urine (UPT)    Salicylate level    UA with Microscopic    XR Abdomen 1 View    XR Neck Soft Tissue      Orders Needing Specimen Collection     None      Pending Results     Date and Time Order Name Status Description    6/10/2018 1419 EKG 12 lead Preliminary             Pending Culture Results     No orders found from 6/8/2018 to 6/11/2018.            Pending Results Instructions     If you had any lab results that were not finalized at the time of your Discharge, you can call the ED Lab Result RN at [phone]. You will be contacted by this team for any positive Lab results or changes in treatment. The nurses are available 7 days a week from 10A to 6:30P.  You can leave a message 24 hours per day and they will return your call.        Test Results From Your Hospital Stay        6/10/2018  3:30 PM      Component Results     Component Value Ref Range & Units Status    WBC 7.7 4.0 - 11.0 10e9/L Final    RBC Count 4.22 3.8 - 5.2 10e12/L Final    Hemoglobin 12.3 11.7 - 15.7 g/dL Final    Hematocrit 37.2 35.0 - 47.0 % Final    MCV 88 78 - 100 fl Final    MCH 29.1 26.5 - 33.0 pg Final    MCHC 33.1 31.5 - 36.5 g/dL Final    RDW 13.9 10.0 - 15.0 % Final    Platelet Count 305 150 - 450 10e9/L Final    Diff Method Automated Method  Final    % Neutrophils 73.0 % Final    % Lymphocytes  16.7 % Final    % Monocytes 7.9 % Final    % Eosinophils 1.6 % Final    % Basophils 0.4 % Final    % Immature Granulocytes 0.4 % Final    Nucleated RBCs 0 0 /100 Final    Absolute Neutrophil 5.6 1.6 - 8.3 10e9/L Final    Absolute Lymphocytes 1.3 0.8 - 5.3 10e9/L Final    Absolute Monocytes 0.6 0.0 - 1.3 10e9/L Final    Absolute Eosinophils 0.1 0.0 - 0.7 10e9/L Final    Absolute Basophils 0.0 0.0 - 0.2 10e9/L Final    Abs Immature Granulocytes 0.0 0 - 0.4 10e9/L Final    Absolute Nucleated RBC 0.0  Final         6/10/2018  3:44 PM      Component Results     Component Value Ref Range & Units Status    Sodium 141 133 - 144 mmol/L Final    Potassium 3.5 3.4 - 5.3 mmol/L Final    Chloride 107 94 - 109 mmol/L Final    Carbon Dioxide 27 20 - 32 mmol/L Final    Anion Gap 7 3 - 14 mmol/L Final    Glucose 104 (H) 70 - 99 mg/dL Final    Urea Nitrogen 12 7 - 30 mg/dL Final    Creatinine 0.65 0.52 - 1.04 mg/dL Final    GFR Estimate >90 >60 mL/min/1.7m2 Final    Non  GFR Calc    GFR Estimate If Black >90 >60 mL/min/1.7m2 Final    African American GFR Calc    Calcium 9.2 8.5 - 10.1 mg/dL Final         6/10/2018  3:00 PM      Component Results     Component Value Ref Range & Units Status    Color Urine Straw  Final    Appearance Urine Clear  Final    Glucose Urine Negative NEG^Negative mg/dL Final    Bilirubin Urine Negative NEG^Negative Final    Ketones Urine Negative NEG^Negative mg/dL Final    Specific Gravity Urine 1.008 1.003 - 1.035 Final    Blood Urine Negative NEG^Negative Final    pH Urine 5.0 5.0 - 7.0 pH Final    Protein Albumin Urine Negative NEG^Negative mg/dL Final    Urobilinogen mg/dL 0.0 0.0 - 2.0 mg/dL Final    Nitrite Urine Negative NEG^Negative Final    Leukocyte Esterase Urine Negative NEG^Negative Final    Source Midstream Urine  Final    WBC Urine 1 0 - 5 /HPF Final    RBC Urine 1 0 - 2 /HPF Final    Bacteria Urine Few (A) NEG^Negative /HPF Final    Squamous Epithelial /HPF Urine <1 0 - 1 /HPF  Final    Mucous Urine Present (A) NEG^Negative /LPF Final         6/10/2018  3:00 PM      Component Results     Component Value Ref Range & Units Status    HCG Qual Urine Negative NEG^Negative Final    This test is for screening purposes.  Results should be interpreted along with   the clinical picture.  Confirmation testing is available if warranted by   ordering [id number], HCG Quantitative Pregnancy.           6/10/2018  3:12 PM      Component Results     Component Value Ref Range & Units Status    Amphetamine Qual Urine Negative NEG^Negative Final    Cutoff for a negative amphetamine is 500 ng/mL or less.    Barbiturates Qual Urine Negative NEG^Negative Final    Cutoff for a negative barbiturate is 200 ng/mL or less.    Benzodiazepine Qual Urine Negative NEG^Negative Final    Cutoff for a negative benzodiazepine is 200 ng/mL or less.    Cannabinoids Qual Urine Negative NEG^Negative Final    Cutoff for a negative cannabinoid is 50 ng/mL or less.    Cocaine Qual Urine Negative NEG^Negative Final    Cutoff for a negative cocaine is 300 ng/mL or less.    Opiates Qualitative Urine Negative NEG^Negative Final    Cutoff for a negative opiate is 300 ng/mL or less.    PCP Qual Urine Negative NEG^Negative Final    Cutoff for a negative PCP is 25 ng/mL or less.         6/10/2018  4:13 PM      Component Results     Component Value Ref Range & Units Status    Acetaminophen Level <2 mg/L Final    Therapeutic range: 10-20 mg/L  Results confirmed by repeat test           6/10/2018  3:59 PM      Component Results     Component Value Ref Range & Units Status    Salicylate Level <2 mg/dL Final    Therapeutic:        <20  Anti inflammatory:  15-30           6/10/2018  5:08 PM      Narrative     CHEST TWO VIEWS      6/10/2018 4:42 PM     HISTORY: Swallowed thermometer probe in room 28.     COMPARISON: None.        Impression     IMPRESSION: No metallic foreign body seen. Left convex thoracolumbar  scoliosis. Heart size is normal. Lungs  are clear.     ALEIDA CANO MD         6/10/2018  5:08 PM      Narrative     NECK SOFT TISSUE   6/10/2018 4:41 PM     HISTORY: Swallowed thermometer probe in room 28.      COMPARISON: None.        Impression     IMPRESSION: No evidence of metallic or radiopaque foreign body.     ALEIDA CANO MD               6/10/2018  4:47 PM      Narrative     XR ABDOMEN 1 VW  6/10/2018 4:42 PM     HISTORY: swallowed thermometer probe in room 28;     COMPARISON: X-ray from 6/1/2018.        Impression     IMPRESSION: No evidence of metallic or radiopaque foreign body. There  is an IUD in place.    ALEIDA CANO MD                Clinical Quality Measure: Blood Pressure Screening     Your blood pressure was checked while you were in the emergency department today. The last reading we obtained was  BP: 136/85 . Please read the guidelines below about what these numbers mean and what you should do about them.  If your systolic blood pressure (the top number) is less than 120 and your diastolic blood pressure (the bottom number) is less than 80, then your blood pressure is normal. There is nothing more that you need to do about it.  If your systolic blood pressure (the top number) is 120-139 or your diastolic blood pressure (the bottom number) is 80-89, your blood pressure may be higher than it should be. You should have your blood pressure rechecked within a year by a primary care provider.  If your systolic blood pressure (the top number) is 140 or greater or your diastolic blood pressure (the bottom number) is 90 or greater, you may have high blood pressure. High blood pressure is treatable, but if left untreated over time it can put you at risk for heart attack, stroke, or kidney failure. You should have your blood pressure rechecked by a primary care provider within the next 4 weeks.  If your provider in the emergency department today gave you specific instructions to follow-up with your doctor or provider even sooner than that, you  should follow that instruction and not wait for up to 4 weeks for your follow-up visit.        Thank you for choosing [facility]       Thank you for choosing [facility] for your care. Our goal is always to provide you with excellent care. Hearing back from our patients is one way we can continue to improve our services. Please take a few minutes to complete the written survey that you may receive in the mail after you visit with us. Thank you!        [employer] Information     [employer] gives you secure access to your electronic health record. If you see a primary care provider, you can also send messages to your care team and make appointments. If you have questions, please call your primary care clinic.  If you do not have a primary care provider, please call [phone] and they will assist you.        Care EveryWhere ID     This is your Care EveryWhere ID. This could be used by other organizations to access your [facility] medical records  [id number]        Equal Access to Services     ZENON DIAMOND : Gabriel Collado, erick singh, mic brannon . So [facility] [phone].    ATENCIÓN: Si habla español, tiene a singh disposición servicios gratuitos de asistencia lingüística. Llame al [phone].    We comply with applicable federal civil rights laws and [place] laws. We do not discriminate on the basis of race, color, national origin, age, disability, sex, sexual orientation, or gender identity.            After Visit Summary       This is your record. Keep this with you and show to your community pharmacist(s) and doctor(s) at your next visit.

## 2018-06-10 NOTE — ED NOTES
Patient alerted this RN that she got the plastic temperature probe out of her mouth. Patient showed plastic probe to this RN. Part of the probe is missing, and patient states she threw it in the trash, will proceed with X-ray due to missing plastic piece per MD.

## 2018-06-10 NOTE — ED NOTES
Patient discharged to home. Patient received follow-up information with PCP in 1 day and follow-up with mental health. Patient received discharge instructions and has no other questions at this time.

## 2018-06-11 LAB — INTERPRETATION ECG - MUSE: NORMAL

## 2018-06-19 ENCOUNTER — PATIENT OUTREACH (OUTPATIENT)
Dept: CARE COORDINATION | Facility: CLINIC | Age: 28
End: 2018-06-19

## 2018-06-19 NOTE — PROGRESS NOTES
Clinic Care Coordination Contact    Situation: Patient chart reviewed by care coordinator.    Background: MH pt is managed through [facility], has MH CM, Psychiatry, therapy in place.    Assessment: Chronic/Persistantly MI    Plan/Recommendations: Not open to [facility] services. Not a [place] pt.     LEATHA Mayers  Care Coordinator  [phone]  [email or web address]

## 2018-07-06 ENCOUNTER — HOSPITAL ENCOUNTER (EMERGENCY)
Facility: CLINIC | Age: 28
Discharge: HOME | End: 2018-07-07
Attending: EMERGENCY MEDICINE | Admitting: EMERGENCY MEDICINE
Payer: MEDICARE

## 2018-07-06 ENCOUNTER — APPOINTMENT (OUTPATIENT)
Dept: GENERAL RADIOLOGY | Facility: CLINIC | Age: 28
End: 2018-07-06
Attending: EMERGENCY MEDICINE
Payer: MEDICARE

## 2018-07-06 LAB
ANION GAP SERPL CALCULATED.3IONS-SCNC: 6 MMOL/L (ref 3–14)
APAP SERPL-MCNC: <2 MG/L (ref 10–20)
BASOPHILS # BLD AUTO: 0 10E9/L (ref 0–0.2)
BASOPHILS NFR BLD AUTO: 0.3 %
BUN SERPL-MCNC: 11 MG/DL (ref 7–30)
CALCIUM SERPL-MCNC: 9.1 MG/DL (ref 8.5–10.1)
CHLORIDE SERPL-SCNC: 107 MMOL/L (ref 94–109)
CO2 SERPL-SCNC: 27 MMOL/L (ref 20–32)
CREAT SERPL-MCNC: 0.73 MG/DL (ref 0.52–1.04)
DIFFERENTIAL METHOD BLD: NORMAL
EOSINOPHIL # BLD AUTO: 0.2 10E9/L (ref 0–0.7)
EOSINOPHIL NFR BLD AUTO: 1.5 %
ERYTHROCYTE [DISTWIDTH] IN BLOOD BY AUTOMATED COUNT: 12.7 % (ref 10–15)
GFR SERPL CREATININE-BSD FRML MDRD: >90 ML/MIN/1.7M2
GLUCOSE SERPL-MCNC: 97 MG/DL (ref 70–99)
HCT VFR BLD AUTO: 36 % (ref 35–47)
HGB BLD-MCNC: 11.8 G/DL (ref 11.7–15.7)
IMM GRANULOCYTES # BLD: 0 10E9/L (ref 0–0.4)
IMM GRANULOCYTES NFR BLD: 0.3 %
LYMPHOCYTES # BLD AUTO: 1.7 10E9/L (ref 0.8–5.3)
LYMPHOCYTES NFR BLD AUTO: 16.7 %
MCH RBC QN AUTO: 29.1 PG (ref 26.5–33)
MCHC RBC AUTO-ENTMCNC: 32.8 G/DL (ref 31.5–36.5)
MCV RBC AUTO: 89 FL (ref 78–100)
MONOCYTES # BLD AUTO: 0.6 10E9/L (ref 0–1.3)
MONOCYTES NFR BLD AUTO: 5.8 %
NEUTROPHILS # BLD AUTO: 7.6 10E9/L (ref 1.6–8.3)
NEUTROPHILS NFR BLD AUTO: 75.4 %
NRBC # BLD AUTO: 0 10*3/UL
NRBC BLD AUTO-RTO: 0 /100
PLATELET # BLD AUTO: 262 10E9/L (ref 150–450)
POTASSIUM SERPL-SCNC: 3.6 MMOL/L (ref 3.4–5.3)
RBC # BLD AUTO: 4.05 10E12/L (ref 3.8–5.2)
SODIUM SERPL-SCNC: 140 MMOL/L (ref 133–144)
WBC # BLD AUTO: 10 10E9/L (ref 4–11)

## 2018-07-06 PROCEDURE — 80048 BASIC METABOLIC PNL TOTAL CA: CPT | Performed by: EMERGENCY MEDICINE

## 2018-07-06 PROCEDURE — 99285 EMERGENCY DEPT VISIT HI MDM: CPT | Mod: 25

## 2018-07-06 PROCEDURE — 80329 ANALGESICS NON-OPIOID 1 OR 2: CPT | Performed by: EMERGENCY MEDICINE

## 2018-07-06 PROCEDURE — 74018 RADEX ABDOMEN 1 VIEW: CPT

## 2018-07-06 PROCEDURE — 36415 COLL VENOUS BLD VENIPUNCTURE: CPT | Performed by: EMERGENCY MEDICINE

## 2018-07-06 PROCEDURE — 85025 COMPLETE CBC W/AUTO DIFF WBC: CPT | Performed by: EMERGENCY MEDICINE

## 2018-07-06 ASSESSMENT — ENCOUNTER SYMPTOMS: LIGHT-HEADEDNESS: 1

## 2018-07-06 NOTE — ED AVS SNAPSHOT
[facility] Emergency Department    [address] [place] [address]    Phone:  [phone]    Fax:  [phone]                                       Nevin Alvarado   MRN: [id number]    Department:  [facility] Emergency Department   Date of Visit:  7/6/2018           Patient Information     Date Of Birth          1990        Your diagnoses for this visit were:     Drug ingestion, undetermined intent, initial encounter        You were seen by Yuniel Arana MD and Siddharth Castellano MD.      Follow-up Information     Follow up with Latonya Knight MD In 2 days.    Specialty:  Family Practice    Contact information:    [address] [id number] [phone]          Discharge Instructions         Intentional Overdose, Psych Evaluation (Adult)    You have been evaluated and treated for taking a drug or chemical product with the intent to harm yourself. There is no sign of a toxic effect at this time. It is not likely that any new symptoms will appear. To be safe, watch for new symptoms during the next 24 hours (see below).  Symptoms will depend on the type of drug or chemical you took. An intentional overdose is likely to be a sign that you are depressed, or that you are very angry with yourself or someone else.  Home care    If liquid charcoal was given, it will give a black color to the stools for 1 to 2 days. Usually, a laxative is given with charcoal to speed the removal of any toxins from the intestines. This may cause diarrhea for up to 24 hours.    If you have been given charcoal but no laxative, you may become constipated. If this occurs, you may take an over-the-counter laxative.  Follow-up care  Follow up with your healthcare provider, or as advised.    If you are being sent home, follow up with your healthcare provider, clinic, or therapist. Call your provider as soon as possible.    If you feel the urge to  harm yourself again, call 911.    If you were given information about a doctor, therapist, or clinic, make sure to follow up with them.    If you are being discharged for immediate evaluation at a psychiatric facility on a voluntary basis, you must go directly there with a responsible adult.    If you have been placed on a  72-hour psychiatric hold,  a ride to a facility will be arranged for you.  Note: In the future, if you or someone you know takes something possibly harmful, call the American Association of Poison Control Centers. The phone number is 1-746.990.9232. The phone line is staffed 24 hours a day. If you call, you will be connected to the poison control center closest to you.  Call 911  Call 911 if any of these occur.    Trouble breathing or swallowing, wheezing    Severe confusion    Extreme drowsiness or trouble awakening    Fainting or loss of consciousness    Rapid heart rate    Very slow heart rate    Very low or very high blood pressure    Vomiting blood, or large amounts of blood in stool    Seizure  When to seek medical advice  Call your healthcare provider right away if any of these occur.    Shakiness    Fast breathing (over 25 breaths per minute) or slow breathing (less than 8 breaths per minute)    Feeling shortness of breath    Fever of 100.4 F (38 C) or higher, or as directed by your healthcare provider    Vomiting or diarrhea for more than 24 hours    Abdominal pain    Dizziness or weakness    Thoughts of harming yourself again  Date Last Reviewed: 3/1/2017    0944-0686 [employer]. [address] [place] [address]. All rights reserved. This information is not intended as a substitute for professional medical care. Always follow your healthcare professional's instructions.          24 Hour Appointment Hotline       To make an appointment at any [facility], call [phone] ([phone]). If you don't have a family doctor or clinic, we will help you find  one. [facility] are conveniently located to serve the needs of you and your family.             Review of your medicines      Our records show that you are taking the medicines listed below. If these are incorrect, please call your family doctor or clinic.        Dose / Directions Last dose taken    CLONIDINE HCL PO   Dose:  0.1 mg        Take 0.1 mg by mouth 2 times daily   Refills:  0        levonorgestrel 20 MCG/24HR IUD   Commonly known as:  MIRENA   Dose:  1 each        1 each (20 mcg) by Intrauterine route once for 1 dose   Refills:  0        lurasidone 20 MG Tabs tablet   Commonly known as:  LATUDA   Dose:  40 mg   Quantity:  12 tablet        Take 2 tablets (40 mg) by mouth every evening   Refills:  0        MELATONIN PO   Dose:  3 mg        Take 3 mg by mouth nightly as needed (sleep)   Refills:  0        ondansetron 4 MG ODT tab   Commonly known as:  ZOFRAN-ODT   Dose:  4 mg        Take 4 mg by mouth every 6 hours as needed   Refills:  0        PRISTIQ PO   Dose:  100 mg        Take 100 mg by mouth every morning   Refills:  0        VITAMIN D3 PO   Dose:  2000 Units        Take 2,000 Units by mouth every morning   Refills:  0                Procedures and tests performed during your visit     Abdomen XR 1 vw    Acetaminophen level    Basic metabolic panel    CBC with platelets differential      Orders Needing Specimen Collection     None      Pending Results     No orders found for last 3 day(s).            Pending Culture Results     No orders found for last 3 day(s).            Pending Results Instructions     If you had any lab results that were not finalized at the time of your Discharge, you can call the ED Lab Result RN at [phone]. You will be contacted by this team for any positive Lab results or changes in treatment. The nurses are available 7 days a week from 10A to 6:30P.  You can leave a message 24 hours per day and they will return your call.        Test Results From Your Hospital  Stay        7/6/2018 11:02 PM      Narrative     ABDOMEN ONE VIEW  7/6/2018 10:57 PM     HISTORY: Ingestion. Evaluate for foreign body.    COMPARISON: 6/10/2018.        Impression     IMPRESSION: An IUD is projected over the central pelvis. No other  radiopaque foreign bodies are identified. Bowel gas pattern is within  normal limits. Thoracolumbar curve.    DAE FRAUSTO MD         7/6/2018 11:26 PM      Component Results     Component Value Ref Range & Units Status    Acetaminophen Level <2 mg/L Final    Therapeutic range: 10-20 mg/L         7/6/2018 11:08 PM      Component Results     Component Value Ref Range & Units Status    Sodium 140 133 - 144 mmol/L Final    Potassium 3.6 3.4 - 5.3 mmol/L Final    Chloride 107 94 - 109 mmol/L Final    Carbon Dioxide 27 20 - 32 mmol/L Final    Anion Gap 6 3 - 14 mmol/L Final    Glucose 97 70 - 99 mg/dL Final    Urea Nitrogen 11 7 - 30 mg/dL Final    Creatinine 0.73 0.52 - 1.04 mg/dL Final    GFR Estimate >90 >60 mL/min/1.7m2 Final    Non  GFR Calc    GFR Estimate If Black >90 >60 mL/min/1.7m2 Final    African American GFR Calc    Calcium 9.1 8.5 - 10.1 mg/dL Final         7/6/2018 10:51 PM      Component Results     Component Value Ref Range & Units Status    WBC 10.0 4.0 - 11.0 10e9/L Final    RBC Count 4.05 3.8 - 5.2 10e12/L Final    Hemoglobin 11.8 11.7 - 15.7 g/dL Final    Hematocrit 36.0 35.0 - 47.0 % Final    MCV 89 78 - 100 fl Final    MCH 29.1 26.5 - 33.0 pg Final    MCHC 32.8 31.5 - 36.5 g/dL Final    RDW 12.7 10.0 - 15.0 % Final    Platelet Count 262 150 - 450 10e9/L Final    Diff Method Automated Method  Final    % Neutrophils 75.4 % Final    % Lymphocytes 16.7 % Final    % Monocytes 5.8 % Final    % Eosinophils 1.5 % Final    % Basophils 0.3 % Final    % Immature Granulocytes 0.3 % Final    Nucleated RBCs 0 0 /100 Final    Absolute Neutrophil 7.6 1.6 - 8.3 10e9/L Final    Absolute Lymphocytes 1.7 0.8 - 5.3 10e9/L Final    Absolute Monocytes 0.6  0.0 - 1.3 10e9/L Final    Absolute Eosinophils 0.2 0.0 - 0.7 10e9/L Final    Absolute Basophils 0.0 0.0 - 0.2 10e9/L Final    Abs Immature Granulocytes 0.0 0 - 0.4 10e9/L Final    Absolute Nucleated RBC 0.0  Final                Clinical Quality Measure: Blood Pressure Screening     Your blood pressure was checked while you were in the emergency department today. The last reading we obtained was  BP: 134/79 . Please read the guidelines below about what these numbers mean and what you should do about them.  If your systolic blood pressure (the top number) is less than 120 and your diastolic blood pressure (the bottom number) is less than 80, then your blood pressure is normal. There is nothing more that you need to do about it.  If your systolic blood pressure (the top number) is 120-139 or your diastolic blood pressure (the bottom number) is 80-89, your blood pressure may be higher than it should be. You should have your blood pressure rechecked within a year by a primary care provider.  If your systolic blood pressure (the top number) is 140 or greater or your diastolic blood pressure (the bottom number) is 90 or greater, you may have high blood pressure. High blood pressure is treatable, but if left untreated over time it can put you at risk for heart attack, stroke, or kidney failure. You should have your blood pressure rechecked by a primary care provider within the next 4 weeks.  If your provider in the emergency department today gave you specific instructions to follow-up with your doctor or provider even sooner than that, you should follow that instruction and not wait for up to 4 weeks for your follow-up visit.        Thank you for choosing [facility]       Thank you for choosing [facility] for your care. Our goal is always to provide you with excellent care. Hearing back from our patients is one way we can continue to improve our services. Please take a few minutes to complete the written survey that you may  receive in the mail after you visit with us. Thank you!        [employer] Information     [employer] gives you secure access to your electronic health record. If you see a primary care provider, you can also send messages to your care team and make appointments. If you have questions, please call your primary care clinic.  If you do not have a primary care provider, please call [phone] and they will assist you.        Care EveryWhere ID     This is your Care EveryWhere ID. This could be used by other organizations to access your [place] medical records  [id number]        Equal Access to Services     [facility] CenterJOS[facility] MANUEL : Gabriel Collado, erick singh, taylor tenorio, mic angelo . So [facility] [phone].    ATENCIÓN: Si habla español, tiene a singh disposición servicios gratuitos de asistencia lingüística. Llame al [phone].    We comply with applicable federal civil rights laws and [place] laws. We do not discriminate on the basis of race, color, national origin, age, disability, sex, sexual orientation, or gender identity.            After Visit Summary       This is your record. Keep this with you and show to your community pharmacist(s) and doctor(s) at your next visit.

## 2018-07-06 NOTE — ED AVS SNAPSHOT
[facility] Emergency Department    [address]    [facility] [address]    Phone:  [phone]    Fax:  [phone]                                       Nevin Alvarado   MRN: [id number]    Department:  [facility] Emergency Department   Date of Visit:  7/6/2018           After Visit Summary Signature Page     I have received my discharge instructions, and my questions have been answered. I have discussed any challenges I see with this plan with the nurse or doctor.    ..........................................................................................................................................  Patient/Patient Representative Signature      ..........................................................................................................................................  Patient Representative Print Name and Relationship to Patient    ..................................................               ................................................  Date                                            Time    ..........................................................................................................................................  Reviewed by Signature/Title    ...................................................              ..............................................  Date                                                            Time

## 2018-07-07 VITALS
RESPIRATION RATE: 18 BRPM | SYSTOLIC BLOOD PRESSURE: 129 MMHG | BODY MASS INDEX: 45.82 KG/M2 | WEIGHT: 249 LBS | OXYGEN SATURATION: 96 % | DIASTOLIC BLOOD PRESSURE: 85 MMHG | HEIGHT: 62 IN | TEMPERATURE: 97.7 F

## 2018-07-07 NOTE — ED NOTES
ED VISIT ADDENDUM:    The care of this patient was signed out to me at shift change by Dr. Arana.  I discussed patient with DEC, who has seen the patient numerous times for various overt or possible self-harm attempts.  I also reviewed the recent hospitalization July 4-5 during which she had taken Benadryl, had mild sedation but no other symptoms, and was deemed by psychiatry to not be suicidal.  On recheck, the patient is resting comfortably and denies any suicidal ideation.  Given her history and all the above, I believe she is safe for discharge at this time.  Recommended follow-up with her  and primary care and mental health team in 2-3 days.  She should return immediately for any concerns.    Siddharth Castellano MD  [facility]  [facility] Emergency Department             Siddharth Castellano MD  07/07/18 0045

## 2018-07-07 NOTE — ED TRIAGE NOTES
Pt reports taking twenty 20 mg tablets benadryl.  Pt denies doing this to hurting herself. Pt has hx of swallowing mickie bins and other metal objects.  ABC intact. A/OX4. Pt c/o dizziness.

## 2018-07-07 NOTE — ED PROVIDER NOTES
"  History     Chief Complaint:  Drug Overdose    HPI   Nevin Alvarado is a 27 year old female, with a history of depression and self-harm, who presents  to the ED for the evaluation of drug overdose. The patient reports that she took twenty, 25 mg benadryl pills 30-40 minutes ago. The patient notes the only symptom she is feeling is a little lightheadedness. She did not swallow the pills in self-harm but this is a new \"compulsion\" for her besides swallowing other objects such as mickie pins. The patient denies other ingestions such as Tylenol. She denies this is suicidal and denies attempt to hurt self.     Allergies:  Penicillins, anaphylaxis  Hydrocodone, nausea and vomiting  Influenza vaccines   Oseltamivir, hives  Vicodin, nausea  Cephalosporins, rash  Lamotrigine, rash  Latex, rash  Anti Cw & non-specific antibodies    Medications:    Clonidine  Vitamin D3  Pristiq  Latuda  Melatonin  Zofran  Mirena    Past Medical History:    ADD  Anorexia nervosa with bulimia  Anxiety  Borderline personality disorder  Depression  Self-harm  PTSD  Migraine     Past Surgical History:    History reviewed. No pertinent surgical history.    Family History:    Cerebrovascular Disease    Social History:  Smoking status: Never Smoker  Alcohol use: No  Marital Status:  Single [1]     Review of Systems   Neurological: Positive for light-headedness.   All other systems reviewed and are negative.    Physical Exam   Patient Vitals for the past 24 hrs:   BP Temp Temp src Heart Rate Resp SpO2 Height Weight   07/06/18 2054 (!) 142/102 - - - - - - -   07/06/18 2051 - 97.7  F (36.5  C) Temporal 107 14 100 % 1.575 m (5' 2\") 112.9 kg (249 lb)     Physical Exam  General: Patient is alert and interactive when I enter the room  Head:  The scalp, face, and head appear normal  Eyes:  The pupils are equal, round, and reactive to light    Conjunctivae and sclerae are normal  ENT:    External acoustic canals are normal    The oropharynx is normal " without erythema.     Uvula is in the midline  Neck:  Normal range of motion  CV:  Regular rate. S1/S2. No murmurs.   Resp:  Lungs are clear without wheezes or rales. No distress  GI:  Abdomen is soft, no rigidity, guarding, or rebound    No distension. No tenderness to palpation in any quadrant.     MS:  Normal tone. Joints grossly normal without effusions.     No asymmetric leg swelling, calf or thigh tenderness.      Normal motor assessment of all extremities.  Skin:  No rash or lesions noted. Normal capillary refill noted  Neuro: Speech is normal and fluent. Face is symmetric.     Moving all extremities well.   Psych:  Awake. Alert.  Normal affect.  Appropriate interactions. Denies suicidal or homicidal ideation in direct questioning.   Lymph: No anterior cervical lymphadenopathy noted        Emergency Department Course     Imaging:  Radiographic findings were communicated with the patient who voiced understanding of the findings.    Labs Ordered and Resulted from Time of ED Arrival Up to the Time of Departure from the ED   ACETAMINOPHEN LEVEL   BASIC METABOLIC PANEL   CBC WITH PLATELETS DIFFERENTIAL   AXR: Normal      Emergency Department Course:  Past medical records, nursing notes, and vitals reviewed.  2058: I performed an exam of the patient and obtained history, as documented above.    2103: I spoke with poison control.    2108: I spoke with DEC.     IV inserted and blood drawn.    The patient was sent for an abdomen x-ray while in the emergency department, findings above.    2110: Patient placed on a HAYLEE.     Patient signed out to ED physician, Dr. Castellano    Impression & Plan      Medical Decision Making:  Nevin Alvarado a 27 year old female with a history of self injurious behavior presents for the evaluation of benadryl ingestion. This is at least her fourth or fifth in the last six months of benadryl, which is her new ingestion of choice. She has a history of ingesting mickie pins and springs  from pens as well as other foreign objects. A broad differentials of course considered. She at this point will be watched in the ED for four hours. If she does not develop any signs of anticholinergic toxicity, she can be discharged home I think. Her psychiatrist and mental health professionals are well aware of her self injurious behavior. This is not suicide attempts. These are in control and she just present each time after ingestion. I think she can safely go back home, and I would not hospitalize her at this point. She has had multiple hospitalizations, DEC evaluations, and psychiatric consultations.    Diagnosis:    ICD-10-CM    1. Drug ingestion, undetermined intent, initial encounter T50.904A Basic metabolic panel       Disposition:  Patient signed out to ED physician, Dr. Castellano    Discharge Medications:  New Prescriptions    No medications on file       Erich De Los Santos  7/6/2018   [facility] EMERGENCY DEPARTMENT  Scribe Disclosure:  I, Erich De Los Santos, am serving as a scribe at 8:58 PM on 7/6/2018 to document services personally performed by Yuniel Arana MD based on my observations and the provider's statements to me.        Yuniel Arana MD  07/06/18 7169

## 2018-07-07 NOTE — ED NOTES
Pt here after swallowing over 20 tabs of benedryl. Pt states it was not a suicide attempt but a compulsion she has difficulty controlling. Pt has a long history of ingesting foreign bodies or placing foreign bodies in various orifices.

## 2018-07-07 NOTE — DISCHARGE INSTRUCTIONS
Intentional Overdose, Psych Evaluation (Adult)    You have been evaluated and treated for taking a drug or chemical product with the intent to harm yourself. There is no sign of a toxic effect at this time. It is not likely that any new symptoms will appear. To be safe, watch for new symptoms during the next 24 hours (see below).  Symptoms will depend on the type of drug or chemical you took. An intentional overdose is likely to be a sign that you are depressed, or that you are very angry with yourself or someone else.  Home care    If liquid charcoal was given, it will give a black color to the stools for 1 to 2 days. Usually, a laxative is given with charcoal to speed the removal of any toxins from the intestines. This may cause diarrhea for up to 24 hours.    If you have been given charcoal but no laxative, you may become constipated. If this occurs, you may take an over-the-counter laxative.  Follow-up care  Follow up with your healthcare provider, or as advised.    If you are being sent home, follow up with your healthcare provider, clinic, or therapist. Call your provider as soon as possible.    If you feel the urge to harm yourself again, call 911.    If you were given information about a doctor, therapist, or clinic, make sure to follow up with them.    If you are being discharged for immediate evaluation at a psychiatric facility on a voluntary basis, you must go directly there with a responsible adult.    If you have been placed on a  72-hour psychiatric hold,  a ride to a facility will be arranged for you.  Note: In the future, if you or someone you know takes something possibly harmful, call the American Association of Poison Control Centers. The phone number is 1-924.608.1899. The phone line is staffed 24 hours a day. If you call, you will be connected to the poison control center closest to you.  Call 911  Call 911 if any of these occur.    Trouble breathing or swallowing, wheezing    Severe  confusion    Extreme drowsiness or trouble awakening    Fainting or loss of consciousness    Rapid heart rate    Very slow heart rate    Very low or very high blood pressure    Vomiting blood, or large amounts of blood in stool    Seizure  When to seek medical advice  Call your healthcare provider right away if any of these occur.    Shakiness    Fast breathing (over 25 breaths per minute) or slow breathing (less than 8 breaths per minute)    Feeling shortness of breath    Fever of 100.4 F (38 C) or higher, or as directed by your healthcare provider    Vomiting or diarrhea for more than 24 hours    Abdominal pain    Dizziness or weakness    Thoughts of harming yourself again  Date Last Reviewed: 3/1/2017    8396-4614 [employer]. [address] [place] [address], [place] [address]. All rights reserved. This information is not intended as a substitute for professional medical care. Always follow your healthcare professional's instructions.

## 2018-07-07 NOTE — ED NOTES
MD discontinued need for IV do to very very hard veins and after 3 nurses try. Lab able to obtain blood

## 2018-07-10 ENCOUNTER — PATIENT OUTREACH (OUTPATIENT)
Dept: CARE COORDINATION | Facility: CLINIC | Age: 28
End: 2018-07-10

## 2018-07-10 NOTE — PROGRESS NOTES
Clinic Care Coordination Contact     Situation: Patient chart reviewed by care coordinator.     Background: MH pt is managed through [facility], has MH CM, Psychiatry, therapy in place. Pt only utilizes [place] ED. PCP with Harry     Assessment: Chronic/Persistantly MI     Plan/Recommendations: Not open to [facility] services. Not a [facility] pt.      LEATHA Mayers  Care Coordinator  [phone]  [email or web address]

## 2018-07-16 ENCOUNTER — APPOINTMENT (OUTPATIENT)
Dept: GENERAL RADIOLOGY | Facility: CLINIC | Age: 28
End: 2018-07-16
Attending: NURSE PRACTITIONER
Payer: MEDICARE

## 2018-07-16 ENCOUNTER — HOSPITAL ENCOUNTER (EMERGENCY)
Facility: CLINIC | Age: 28
Discharge: HOME | End: 2018-07-17
Attending: NURSE PRACTITIONER | Admitting: NURSE PRACTITIONER
Payer: MEDICARE

## 2018-07-16 DIAGNOSIS — G89.29 OTHER CHRONIC PAIN: ICD-10-CM

## 2018-07-16 DIAGNOSIS — R10.12 ABDOMINAL PAIN, LEFT UPPER QUADRANT: ICD-10-CM

## 2018-07-16 LAB
ALBUMIN SERPL-MCNC: 3.5 G/DL (ref 3.4–5)
ALP SERPL-CCNC: 94 U/L (ref 40–150)
ALT SERPL W P-5'-P-CCNC: 32 U/L (ref 0–50)
ANION GAP SERPL CALCULATED.3IONS-SCNC: 4 MMOL/L (ref 3–14)
AST SERPL W P-5'-P-CCNC: 24 U/L (ref 0–45)
BILIRUB SERPL-MCNC: 0.2 MG/DL (ref 0.2–1.3)
BUN SERPL-MCNC: 15 MG/DL (ref 7–30)
CALCIUM SERPL-MCNC: 8.8 MG/DL (ref 8.5–10.1)
CHLORIDE SERPL-SCNC: 109 MMOL/L (ref 94–109)
CO2 SERPL-SCNC: 29 MMOL/L (ref 20–32)
CREAT SERPL-MCNC: 0.9 MG/DL (ref 0.52–1.04)
ERYTHROCYTE [DISTWIDTH] IN BLOOD BY AUTOMATED COUNT: 12.9 % (ref 10–15)
GFR SERPL CREATININE-BSD FRML MDRD: 76 ML/MIN/1.7M2
GLUCOSE SERPL-MCNC: 115 MG/DL (ref 70–99)
HCT VFR BLD AUTO: 36.8 % (ref 35–47)
HGB BLD-MCNC: 11.7 G/DL (ref 11.7–15.7)
LIPASE SERPL-CCNC: 128 U/L (ref 73–393)
MCH RBC QN AUTO: 29.2 PG (ref 26.5–33)
MCHC RBC AUTO-ENTMCNC: 31.8 G/DL (ref 31.5–36.5)
MCV RBC AUTO: 92 FL (ref 78–100)
PLATELET # BLD AUTO: 210 10E9/L (ref 150–450)
POTASSIUM SERPL-SCNC: 3.7 MMOL/L (ref 3.4–5.3)
PROT SERPL-MCNC: 6.9 G/DL (ref 6.8–8.8)
RBC # BLD AUTO: 4.01 10E12/L (ref 3.8–5.2)
SODIUM SERPL-SCNC: 142 MMOL/L (ref 133–144)
WBC # BLD AUTO: 8.8 10E9/L (ref 4–11)

## 2018-07-16 PROCEDURE — 74018 RADEX ABDOMEN 1 VIEW: CPT

## 2018-07-16 PROCEDURE — 36415 COLL VENOUS BLD VENIPUNCTURE: CPT | Performed by: NURSE PRACTITIONER

## 2018-07-16 PROCEDURE — 25000128 H RX IP 250 OP 636: Performed by: NURSE PRACTITIONER

## 2018-07-16 PROCEDURE — 80053 COMPREHEN METABOLIC PANEL: CPT | Performed by: NURSE PRACTITIONER

## 2018-07-16 PROCEDURE — 83690 ASSAY OF LIPASE: CPT | Performed by: NURSE PRACTITIONER

## 2018-07-16 PROCEDURE — 96374 THER/PROPH/DIAG INJ IV PUSH: CPT

## 2018-07-16 PROCEDURE — 85027 COMPLETE CBC AUTOMATED: CPT | Performed by: NURSE PRACTITIONER

## 2018-07-16 PROCEDURE — 99284 EMERGENCY DEPT VISIT MOD MDM: CPT | Mod: 25

## 2018-07-16 RX ORDER — KETOROLAC TROMETHAMINE 15 MG/ML
15 INJECTION, SOLUTION INTRAMUSCULAR; INTRAVENOUS ONCE
Status: COMPLETED | OUTPATIENT
Start: 2018-07-16 | End: 2018-07-16

## 2018-07-16 RX ADMIN — KETOROLAC TROMETHAMINE 15 MG: 15 INJECTION, SOLUTION INTRAMUSCULAR; INTRAVENOUS at 22:56

## 2018-07-16 ASSESSMENT — ENCOUNTER SYMPTOMS
CONSTIPATION: 1
DIARRHEA: 0
VOMITING: 0
ABDOMINAL PAIN: 1
NAUSEA: 0

## 2018-07-16 NOTE — ED AVS SNAPSHOT
[facility] Emergency Department    [address]    [facility] [address]    Phone:  [phone]    Fax:  [phone]                                       Nevin Alvarado   MRN: [id number]    Department:  [facility] Emergency Department   Date of Visit:  7/16/2018           After Visit Summary Signature Page     I have received my discharge instructions, and my questions have been answered. I have discussed any challenges I see with this plan with the nurse or doctor.    ..........................................................................................................................................  Patient/Patient Representative Signature      ..........................................................................................................................................  Patient Representative Print Name and Relationship to Patient    ..................................................               ................................................  Date                                            Time    ..........................................................................................................................................  Reviewed by Signature/Title    ...................................................              ..............................................  Date                                                            Time

## 2018-07-16 NOTE — ED AVS SNAPSHOT
[facility] Emergency Department    [address] [place] [address]    Phone:  [phone]    Fax:  [phone]                                       Nevin Alvarado   MRN: [id number]    Department:  [facility] Emergency Department   Date of Visit:  7/16/2018           Patient Information     Date Of Birth          1990        Your diagnoses for this visit were:     Other chronic pain     Abdominal pain, left upper quadrant        You were seen by Ivan Henry APRN CNP.      Follow-up Information     Follow up with Latonya Knight MD In 3 days.    Specialty:  Family Practice    Why:  if continuned symptoms or sooner if worsening    Contact information:    [address] [id number] [phone]          Discharge Instructions       Do not continue to use laxatives.  Eat a healthy diet including plenty of fruits and vegetables.  Take ibuprofen as needed as directed for discomfort.  Follow-up with your primary care provider for further concerns.    Discharge Instructions  Abdominal Pain    Abdominal pain (belly pain) can be caused by many things. Your evaluation today does not show the exact cause for your pain. Your provider today has decided that it is unlikely your pain is due to a life threatening problem, or a problem requiring surgery or hospital admission. Sometimes those problems cannot be found right away, so it is very important that you follow up as directed.  Sometimes only the changes which occur over time allow the cause of your pain to be found.    Generally, every Emergency Department visit should have a follow-up clinic visit with either a primary or a specialty clinic/provider. Please follow-up as instructed by your emergency provider today. With abdominal pain, we often recommend very close follow-up, such as the following day.    ADULTS:  Return to the Emergency Department right away if:      You get an  oral temperature above 102oF or as directed by your provider.    You have blood in your stools. This may be bright red or appear as black, tarry stools.      You keep vomiting (throwing up) or cannot drink liquids.    You see blood when you vomit.     You cannot have a bowel movement or you cannot pass gas.    Your stomach gets bloated or bigger.    Your skin or the whites of your eyes look yellow.    You faint.    You have bloody, frequent or painful urination (peeing).    You have new symptoms or anything that worries you.    CHILDREN:  Return to the Emergency Department right away if your child has any of the above-listed symptoms or the following:      Pushes your hand away or screams/cries when his/her belly is touched.    You notice your child is very fussy or weak.    Your child is very tired and is too tired to eat or drink.    Your child is dehydrated.  Signs of dehydration can be:  o Significant change in the amount of wet diapers/urine.  o Your infant or child starts to have dry mouth and lips, or no saliva (spit) or tears.    PREGNANT WOMEN:  Return to the Emergency Department right away if you have any of the above-listed symptoms or the following:      You have bleeding, leaking fluid or passing tissue from the vagina.    You have worse pain or cramping, or pain in your shoulder or back.    You have vomiting that will not stop.    You have a temperature of 100oF or more.    Your baby is not moving as much as usual.    You faint.    You get a bad headache with or without eye problems and abdominal pain.    You have a seizure.    You have unusual discharge from your vagina and abdominal pain.    Abdominal pain is pretty common during pregnancy.  Your pain may or may not be related to your pregnancy. You should follow-up closely with your OB provider so they can evaluate you and your baby.  Until you follow-up with your regular provider, do the following:       Avoid sex and do not put anything in your  "vagina.    Drink clear fluids.    Only take medications approved by your provider.    MORE INFORMATION:    Appendicitis:  A possible cause of abdominal pain in any person who still has their appendix is acute appendicitis. Appendicitis is often hard to diagnose.  Testing does not always rule out early appendicitis or other causes of abdominal pain. Close follow-up with your provider and re-evaluations may be needed to figure out the reason for your abdominal pain.    Follow-up:  It is very important that you make an appointment with your clinic and go to the appointment.  If you do not follow-up with your primary provider, it may result in missing an important development which could result in permanent injury or disability and/or lasting pain.  If there is any problem keeping your appointment, call your provider or return to the Emergency Department.    Medications:  Take your medications as directed by your provider today.  Before using over-the-counter medications, ask your provider and make sure to take the medications as directed.  If you have any questions about medications, ask your provider.    Diet:  Resume your normal diet as much as possible, but do not eat fried, fatty or spicy foods while you have pain.  Do not drink alcohol or have caffeine.  Do not smoke tobacco.    Probiotics: If you have been given an antibiotic, you may want to also take a probiotic pill or eat yogurt with live cultures. Probiotics have \"good bacteria\" to help your intestines stay healthy. Studies have shown that probiotics help prevent diarrhea (loose stools) and other intestine problems (including C. diff infection) when you take antibiotics. You can buy these without a prescription in the pharmacy section of the store.     If you were given a prescription for medicine here today, be sure to read all of the information (including the package insert) that comes with your prescription.  This will include important information about " the medicine, its side effects, and any warnings that you need to know about.  The pharmacist who fills the prescription can provide more information and answer questions you may have about the medicine.  If you have questions or concerns that the pharmacist cannot address, please call or return to the Emergency Department.       Remember that you can always come back to the Emergency Department if you are not able to see your regular provider in the amount of time listed above, if you get any new symptoms, or if there is anything that worries you.    24 Hour Appointment Hotline       To make an appointment at any [facility], call [phone] ([phone]). If you don't have a family doctor or clinic, we will help you find one. [place] clinics are conveniently located to serve the needs of you and your family.             Review of your medicines      Our records show that you are taking the medicines listed below. If these are incorrect, please call your family doctor or clinic.        Dose / Directions Last dose taken    CLONIDINE HCL PO   Dose:  0.1 mg        Take 0.1 mg by mouth 2 times daily   Refills:  0        levonorgestrel 20 MCG/24HR IUD   Commonly known as:  MIRENA   Dose:  1 each        1 each (20 mcg) by Intrauterine route once for 1 dose   Refills:  0        lurasidone 20 MG Tabs tablet   Commonly known as:  LATUDA   Dose:  40 mg   Quantity:  12 tablet        Take 2 tablets (40 mg) by mouth every evening   Refills:  0        MELATONIN PO   Dose:  3 mg        Take 3 mg by mouth nightly as needed (sleep)   Refills:  0        ondansetron 4 MG ODT tab   Commonly known as:  ZOFRAN-ODT   Dose:  4 mg        Take 4 mg by mouth every 6 hours as needed   Refills:  0        PRISTIQ PO   Dose:  100 mg        Take 100 mg by mouth every morning   Refills:  0        VITAMIN D3 PO   Dose:  2000 Units        Take 2,000 Units by mouth every morning   Refills:  0                Procedures and tests performed  during your visit     CBC (platelets, no diff)    Comprehensive metabolic panel    Lipase    XR Abdomen 1 View      Orders Needing Specimen Collection     None      Pending Results     No orders found for last 3 day(s).            Pending Culture Results     No orders found for last 3 day(s).            Pending Results Instructions     If you had any lab results that were not finalized at the time of your Discharge, you can call the ED Lab Result RN at [phone]. You will be contacted by this team for any positive Lab results or changes in treatment. The nurses are available 7 days a week from 10A to 6:30P.  You can leave a message 24 hours per day and they will return your call.        Test Results From Your Hospital Stay        7/16/2018 10:24 PM      Component Results     Component Value Ref Range & Units Status    WBC 8.8 4.0 - 11.0 10e9/L Final    RBC Count 4.01 3.8 - 5.2 10e12/L Final    Hemoglobin 11.7 11.7 - 15.7 g/dL Final    Hematocrit 36.8 35.0 - 47.0 % Final    MCV 92 78 - 100 fl Final    MCH 29.2 26.5 - 33.0 pg Final    MCHC 31.8 31.5 - 36.5 g/dL Final    RDW 12.9 10.0 - 15.0 % Final    Platelet Count 210 150 - 450 10e9/L Final                     7/16/2018 10:32 PM      Narrative     XR ABDOMEN 1 VW  7/16/2018 10:28 PM     HISTORY:  ? FB,  constipation;     COMPARISON: None.    FINDINGS:  An IUD is seen projected over the mid pelvis. No radiopaque  foreign bodies are identified. Gas pattern is normal.        Impression     IMPRESSION: No change compared to 7/6/2018. No new foreign body  identified.    PELON MELÉNDEZ MD         7/16/2018 11:20 PM      Component Results     Component Value Ref Range & Units Status    Sodium 142 133 - 144 mmol/L Final    Potassium 3.7 3.4 - 5.3 mmol/L Final    Chloride 109 94 - 109 mmol/L Final    Carbon Dioxide 29 20 - 32 mmol/L Final    Anion Gap 4 3 - 14 mmol/L Final    Glucose 115 (H) 70 - 99 mg/dL Final    Urea Nitrogen 15 7 - 30 mg/dL Final    Creatinine 0.90 0.52 -  1.04 mg/dL Final    GFR Estimate 76 >60 mL/min/1.7m2 Final    Non  GFR Calc    GFR Estimate If Black >90 >60 mL/min/1.7m2 Final    African American GFR Calc    Calcium 8.8 8.5 - 10.1 mg/dL Final    Bilirubin Total 0.2 0.2 - 1.3 mg/dL Final    Albumin 3.5 3.4 - 5.0 g/dL Final    Protein Total 6.9 6.8 - 8.8 g/dL Final    Alkaline Phosphatase 94 40 - 150 U/L Final    ALT 32 0 - 50 U/L Final    AST 24 0 - 45 U/L Final         7/16/2018 11:19 PM      Component Results     Component Value Ref Range & Units Status    Lipase 128 73 - 393 U/L Final                Clinical Quality Measure: Blood Pressure Screening     Your blood pressure was checked while you were in the emergency department today. The last reading we obtained was  BP: 134/76 . Please read the guidelines below about what these numbers mean and what you should do about them.  If your systolic blood pressure (the top number) is less than 120 and your diastolic blood pressure (the bottom number) is less than 80, then your blood pressure is normal. There is nothing more that you need to do about it.  If your systolic blood pressure (the top number) is 120-139 or your diastolic blood pressure (the bottom number) is 80-89, your blood pressure may be higher than it should be. You should have your blood pressure rechecked within a year by a primary care provider.  If your systolic blood pressure (the top number) is 140 or greater or your diastolic blood pressure (the bottom number) is 90 or greater, you may have high blood pressure. High blood pressure is treatable, but if left untreated over time it can put you at risk for heart attack, stroke, or kidney failure. You should have your blood pressure rechecked by a primary care provider within the next 4 weeks.  If your provider in the emergency department today gave you specific instructions to follow-up with your doctor or provider even sooner than that, you should follow that instruction and not  wait for up to 4 weeks for your follow-up visit.        Thank you for choosing [facility]       Thank you for choosing [facility] for your care. Our goal is always to provide you with excellent care. Hearing back from our patients is one way we can continue to improve our services. Please take a few minutes to complete the written survey that you may receive in the mail after you visit with us. Thank you!        [facility] Information     [facility] gives you secure access to your electronic health record. If you see a primary care provider, you can also send messages to your care team and make appointments. If you have questions, please call your primary care clinic.  If you do not have a primary care provider, please call [phone] and they will assist you.        Care EveryWhere ID     This is your Care EveryWhere ID. This could be used by other organizations to access your [facility] medical records  [id number]        Equal Access to Services     ZENON DIAMOND : Gabriel Collado, erick singh, mic brannon. So [facility] [phone].    ATENCIÓN: Si habla español, tiene a singh disposición servicios gratuitos de asistencia lingüística. Llame al [phone].    We comply with applicable federal civil rights laws and [place] laws. We do not discriminate on the basis of race, color, national origin, age, disability, sex, sexual orientation, or gender identity.            After Visit Summary       This is your record. Keep this with you and show to your community pharmacist(s) and doctor(s) at your next visit.

## 2018-07-17 VITALS
WEIGHT: 249 LBS | BODY MASS INDEX: 45.82 KG/M2 | OXYGEN SATURATION: 98 % | TEMPERATURE: 98.1 F | DIASTOLIC BLOOD PRESSURE: 78 MMHG | HEART RATE: 88 BPM | RESPIRATION RATE: 16 BRPM | SYSTOLIC BLOOD PRESSURE: 127 MMHG | HEIGHT: 62 IN

## 2018-07-17 ASSESSMENT — ENCOUNTER SYMPTOMS
DYSURIA: 0
FREQUENCY: 0
CHILLS: 0
SHORTNESS OF BREATH: 0
FEVER: 0

## 2018-07-17 NOTE — ED NOTES
Bed: ED27  Expected date:   Expected time:   Means of arrival: Ambulance  Comments:  EMILEE. 27F. Abd Pain

## 2018-07-17 NOTE — ED PROVIDER NOTES
"  History     Chief Complaint:  Abdominal pain    HPI   Nevin Alvarado is a 27 year old female who presents with abdominal pain. Starting last Monday (1 week ago), the patient reports developing low left quadrant abdominal pain. This was also accompanied by diarrhea, but as the pain worsened she reports that she was not able to pass any stool at all. Upon arrival she reports continuous abdominal pain but denies any nausea, vomiting, or any other symptoms at this time. Of note, the patient was at [facility] where she received a CT scan and had a pelvic exam and was diagnosed with a yeast infection. She was also seen in the [facility] ED yesterday for a metal spring which was found in her stomach and removed.     Allergies:  Lamotrigine  Cephalosporins  Vicodin  Osteltamivir  Hydrocodone  Penicillins     Medications:    Zofran  Latuda  Mirena  Pristiq  Clonidine    Past Medical History:    ADD  Anorexia  PTSD  Morbid obesity  Migraine  Self-harm  Depression  Borderline personality disorder  Anxiety    Past Surgical History:    EGD combined x 8  Sigmoidoscopy flexible x2  Mammoplasty reduction  Laparotomy   Knee surgery  Removal of fecal foreign body    Family History:    Cerebrovascular disease    Social History:  Smoking Status: No  Smokeless Tobacco: No  Alcohol Use: No  Marital Status:  Single      Review of Systems   Constitutional: Negative for chills and fever.   Respiratory: Negative for shortness of breath.    Gastrointestinal: Positive for abdominal pain and constipation. Negative for diarrhea, nausea and vomiting.   Genitourinary: Negative for dysuria and frequency.   All other systems reviewed and are negative.    Physical Exam   Vitals:  Patient Vitals for the past 24 hrs:   BP Temp Temp src Pulse Resp SpO2 Height Weight   07/16/18 2300 134/76 - - - - - - -   07/16/18 2215 - - - - - 98 % - -   07/16/18 2106 144/84 98.1  F (36.7  C) Oral 92 16 99 % 1.575 m (5' 2\") 112.9 kg (249 lb)     Physical " Exam  General: Alert, Mild  discomfort, well kept, obese   Eyes: PERRL, conjunctivae pink no scleral icterus or conjunctival injection  ENT:   Moist mucus membranes, posterior oropharynx clear without erythema or exudates, No lymphadenopathy, Normal voice  Resp:  Lungs clear to auscultation bilaterally, no crackles/rubs/wheezes. Good air movement  CV:  Normal rate and rhythm, no murmurs/rubs/gallops  GI:  Abdomen soft and non-distended.  Normoactive BS.  No tenderness, guarding or rebound, No masses  Skin:  Warm, dry.  No rashes or petechiae  Musculoskeletal: No peripheral edema or calf tenderness, Normal gross ROM   Neuro: Alert and oriented to person/place/time, normal sensation  Psychiatric: Normal affect, cooperative, good eye contact  Question left sided abdominal tenderness    Emergency Department Course     Imaging:  Radiology findings were communicated with the patient who voiced understanding of the findings.  XR abdomen 1 view:  No change compared to 7/6/2018. No new foreign body identified.    Per Radiologist.     Laboratory:  Laboratory findings were communicated with the patient who voiced understanding of the findings.  CBC: AWNL (WBC 8.8, HGB 11.7, )  CMP: Glucose: 115 (H), o/w WNL (Creatinine 0.90)  Lipase: 128    Interventions:  2256 Toradol, 15 mg IV  2257 Normal Saline 1000 mL IV     Emergency Department Course:  Nursing notes and vitals reviewed.  2155 I had my initial encounter with the patient.  I performed an exam of the patient as documented above.   The patient was sent for a XR while in the emergency department, results above.   IV was inserted and blood was drawn for laboratory testing, results above.  2338 I updated the patient on the results of her imaging     2332 I discussed the treatment plan with the patient. They expressed understanding of this plan and consented to discharge. They will be discharged home with instructions for care and follow up. In addition, the patient will  return to the emergency department with any new or worsening symptoms.  All questions were answered.  Impression & Plan      Medical Decision Making:  Nevin Alvarado is a 27 year old female who presents today for evaluation of ongoing abdominal pain.  She has a history of chronic abdominal pain with multiple visits to multiple emergency departments.  She has a care plan with our facility.  She was seen at 3 different ERs in the last 3 days.  Her laboratory studies today are unremarkable and noncontributory.  I did obtain x-ray as she has a history of foreign bodies with no indication for foreign body.  She also reported the inability to have a bowel movement and is worried about constipation.  Her x-ray shows no evidence of constipation.  She had reported using laxatives recently and subsequent diarrhea.  It appears that she may have cleaned out her bowels using the laxatives.  She feels improved after the above treatment.  There is no indication for further testing or imaging.  At this point she is safe and appropriate for outpatient management and follow-up with her primary care provider.      Diagnosis:    ICD-10-CM    1. Other chronic pain G89.29    2. Abdominal pain, left upper quadrant R10.12        Disposition:   Discharge    Scribe Disclosure:  I, Siddharth Vazquez, am serving as a scribe at 9:20 PM on 7/16/2018 to document services personally performed by Ivan Henry APRN*, based on my observations and the provider's statements to me.  7/16/2018   [facility] EMERGENCY DEPARTMENT     Ivan Henry APRN CNP  07/17/18 0033

## 2018-07-17 NOTE — DISCHARGE INSTRUCTIONS
Do not continue to use laxatives.  Eat a healthy diet including plenty of fruits and vegetables.  Take ibuprofen as needed as directed for discomfort.  Follow-up with your primary care provider for further concerns.    Discharge Instructions  Abdominal Pain    Abdominal pain (belly pain) can be caused by many things. Your evaluation today does not show the exact cause for your pain. Your provider today has decided that it is unlikely your pain is due to a life threatening problem, or a problem requiring surgery or hospital admission. Sometimes those problems cannot be found right away, so it is very important that you follow up as directed.  Sometimes only the changes which occur over time allow the cause of your pain to be found.    Generally, every Emergency Department visit should have a follow-up clinic visit with either a primary or a specialty clinic/provider. Please follow-up as instructed by your emergency provider today. With abdominal pain, we often recommend very close follow-up, such as the following day.    ADULTS:  Return to the Emergency Department right away if:      You get an oral temperature above 102oF or as directed by your provider.    You have blood in your stools. This may be bright red or appear as black, tarry stools.      You keep vomiting (throwing up) or cannot drink liquids.    You see blood when you vomit.     You cannot have a bowel movement or you cannot pass gas.    Your stomach gets bloated or bigger.    Your skin or the whites of your eyes look yellow.    You faint.    You have bloody, frequent or painful urination (peeing).    You have new symptoms or anything that worries you.    CHILDREN:  Return to the Emergency Department right away if your child has any of the above-listed symptoms or the following:      Pushes your hand away or screams/cries when his/her belly is touched.    You notice your child is very fussy or weak.    Your child is very tired and is too tired to eat or  drink.    Your child is dehydrated.  Signs of dehydration can be:  o Significant change in the amount of wet diapers/urine.  o Your infant or child starts to have dry mouth and lips, or no saliva (spit) or tears.    PREGNANT WOMEN:  Return to the Emergency Department right away if you have any of the above-listed symptoms or the following:      You have bleeding, leaking fluid or passing tissue from the vagina.    You have worse pain or cramping, or pain in your shoulder or back.    You have vomiting that will not stop.    You have a temperature of 100oF or more.    Your baby is not moving as much as usual.    You faint.    You get a bad headache with or without eye problems and abdominal pain.    You have a seizure.    You have unusual discharge from your vagina and abdominal pain.    Abdominal pain is pretty common during pregnancy.  Your pain may or may not be related to your pregnancy. You should follow-up closely with your OB provider so they can evaluate you and your baby.  Until you follow-up with your regular provider, do the following:       Avoid sex and do not put anything in your vagina.    Drink clear fluids.    Only take medications approved by your provider.    MORE INFORMATION:    Appendicitis:  A possible cause of abdominal pain in any person who still has their appendix is acute appendicitis. Appendicitis is often hard to diagnose.  Testing does not always rule out early appendicitis or other causes of abdominal pain. Close follow-up with your provider and re-evaluations may be needed to figure out the reason for your abdominal pain.    Follow-up:  It is very important that you make an appointment with your clinic and go to the appointment.  If you do not follow-up with your primary provider, it may result in missing an important development which could result in permanent injury or disability and/or lasting pain.  If there is any problem keeping your appointment, call your provider or return to  "the Emergency Department.    Medications:  Take your medications as directed by your provider today.  Before using over-the-counter medications, ask your provider and make sure to take the medications as directed.  If you have any questions about medications, ask your provider.    Diet:  Resume your normal diet as much as possible, but do not eat fried, fatty or spicy foods while you have pain.  Do not drink alcohol or have caffeine.  Do not smoke tobacco.    Probiotics: If you have been given an antibiotic, you may want to also take a probiotic pill or eat yogurt with live cultures. Probiotics have \"good bacteria\" to help your intestines stay healthy. Studies have shown that probiotics help prevent diarrhea (loose stools) and other intestine problems (including C. diff infection) when you take antibiotics. You can buy these without a prescription in the pharmacy section of the store.     If you were given a prescription for medicine here today, be sure to read all of the information (including the package insert) that comes with your prescription.  This will include important information about the medicine, its side effects, and any warnings that you need to know about.  The pharmacist who fills the prescription can provide more information and answer questions you may have about the medicine.  If you have questions or concerns that the pharmacist cannot address, please call or return to the Emergency Department.       Remember that you can always come back to the Emergency Department if you are not able to see your regular provider in the amount of time listed above, if you get any new symptoms, or if there is anything that worries you.  "

## 2018-07-17 NOTE — ED TRIAGE NOTES
Pt presents with low abdominal pain. Onset Monday morning. Has progressively worsened. Initially had diarrhea but now unable to pass any stool or flatus. Reports nausea but no vomiting.

## 2018-07-17 NOTE — ED NOTES
One IV attempt without success but blood work obtained. Pt has had multiple IV's and venipunctures the last few days at various ED's. Will oral hydrate. Pt in agreement with plan.

## 2018-07-18 ENCOUNTER — HOSPITAL ENCOUNTER (INPATIENT)
Facility: CLINIC | Age: 28
LOS: 1 days | Discharge: HOME | DRG: 394 | End: 2018-07-19
Attending: EMERGENCY MEDICINE | Admitting: COLON & RECTAL SURGERY
Payer: MEDICARE

## 2018-07-18 ENCOUNTER — APPOINTMENT (OUTPATIENT)
Dept: GENERAL RADIOLOGY | Facility: CLINIC | Age: 28
DRG: 394 | End: 2018-07-18
Attending: EMERGENCY MEDICINE
Payer: MEDICARE

## 2018-07-18 DIAGNOSIS — T18.5XXA RECTAL FOREIGN BODY, INITIAL ENCOUNTER: ICD-10-CM

## 2018-07-18 PROCEDURE — 99285 EMERGENCY DEPT VISIT HI MDM: CPT | Mod: 25 | Performed by: EMERGENCY MEDICINE

## 2018-07-18 PROCEDURE — 96374 THER/PROPH/DIAG INJ IV PUSH: CPT | Performed by: EMERGENCY MEDICINE

## 2018-07-18 PROCEDURE — 93005 ELECTROCARDIOGRAM TRACING: CPT | Performed by: EMERGENCY MEDICINE

## 2018-07-18 PROCEDURE — 74018 RADEX ABDOMEN 1 VIEW: CPT

## 2018-07-18 PROCEDURE — 99285 EMERGENCY DEPT VISIT HI MDM: CPT | Mod: Z6 | Performed by: EMERGENCY MEDICINE

## 2018-07-18 NOTE — IP AVS SNAPSHOT
[address] [address]    Phone:  [phone]                                       After Visit Summary   7/18/2018    Nevin Alvarado    MRN: [id number]           After Visit Summary Signature Page     I have received my discharge instructions, and my questions have been answered. I have discussed any challenges I see with this plan with the nurse or doctor.    ..........................................................................................................................................  Patient/Patient Representative Signature      ..........................................................................................................................................  Patient Representative Print Name and Relationship to Patient    ..................................................               ................................................  Date                                            Time    ..........................................................................................................................................  Reviewed by Signature/Title    ...................................................              ..............................................  Date                                                            Time

## 2018-07-18 NOTE — IP AVS SNAPSHOT
MRN:[id number]                      After Visit Summary   7/18/2018    Nevin Alvarado    MRN: [id number]           Thank you!     Thank you for choosing [facility] for your care. Our goal is always to provide you with excellent care. Hearing back from our patients is one way we can continue to improve our services. Please take a few minutes to complete the written survey that you may receive in the mail after you visit with us. Thank you!        Patient Information     Date Of Birth          1990        About your hospital stay     You were admitted on:  July 19, 2018 You last received care in the:  [address] [place] [address]    You were discharged on:  July 19, 2018        Reason for your hospital stay       You presented with two foreign bodies in rectum, which were removed in the OR.                  Who to Call     For medical emergencies, please call 911.  For non-urgent questions about your medical care, please call your primary care provider or clinic, [phone]  For questions related to your surgery, please call your surgery clinic        Attending Provider     Provider Specialty    Jeffy Velasquez MD Emergency Medicine    Annmarie Haynes MD Colon and Rectal Surgery       Primary Care Provider Office Phone # Fax #    Latonya Knight [phone]      After Care Instructions     Activity       Your activity upon discharge: activity as tolerated            Diet       Follow this diet upon discharge: Orders Placed This Encounter      Advance Diet as Tolerated: Regular Diet Adult                  Follow-up Appointments     Adult [facility] Follow-up and recommended labs and tests       NOTIFY  Please go to the nearest ER if :  -Temperature > 101F, chills, rigors, dizziness  -Redness around or purulent drainage from wound  -Inability to tolerate diet, nausea or vomiting  -You stop passing gas (or your stoma stops functioning), develop significant  "bloating, abdominal pain  -Have blood in stools/vomit  -Have severe diarrhea/constipation  -Any other questions or concerns.    FOLLOW-UP  1.  Please follow up with your primary care physician within next two weeks.     2. Do not need to follow up with Colorectal surgery clinic.                  Additional Information     If you use hormonal birth control (such as the pill, patch, ring or implants): You'll need a second form of birth control for 7 days (condoms, a diaphragm or contraceptive foam). While in the hospital, you received a medicine called Bridion. Your normal birth control will not work as well for a week after taking this medicine.          Pending Results     No orders found for last 3 day(s).            Statement of Approval     Ordered          07/19/18 1544  I have reviewed and agree with all the recommendations and orders detailed in this document.  EFFECTIVE NOW     Approved and electronically signed by:  Duarte Dotson MD             Admission Information     Date & Time Provider Department Dept. Phone    7/18/2018 Annmarie Haynes MD [address] [facility] [phone]      Your Vitals Were     Blood Pressure Pulse Temperature Respirations Height Weight    130/63 (BP Location: Right arm) 92 96.9  F (36.1  C) (Oral) 14 1.575 m (5' 2\") 113.4 kg (250 lb)    Pulse Oximetry BMI (Body Mass Index)                95% 45.73 kg/m2          MyChart Information     [employer] gives you secure access to your electronic health record. If you see a primary care provider, you can also send messages to your care team and make appointments. If you have questions, please call your primary care clinic.  If you do not have a primary care provider, please call [phone] and they will assist you.        Care EveryWhere ID     This is your Care EveryWhere ID. This could be used by other organizations to access your [facility] medical records  [id number]        Equal Access to Services     ZENON DIAMOND AH: " Hadii zaire duqueo Soomaali, waaxda luqadaha, qaybta kaalmada jona, mic omain hayaacorrie lewisyris taylor laroxycorrie cori. So [facility] [phone].    ATENCIÓN: Si yanna chamorro, tiene a singh disposición servicios gratuitos de asistencia lingüística. Llame al [phone].    We comply with applicable federal civil rights laws and [place] laws. We do not discriminate on the basis of race, color, national origin, age, disability, sex, sexual orientation, or gender identity.               Review of your medicines      START taking        Dose / Directions    acetaminophen 325 MG tablet   Commonly known as:  TYLENOL   Used for:  Rectal foreign body, initial encounter        Dose:  650 mg   Take 2 tablets (650 mg) by mouth every 8 hours   Quantity:  10 tablet   Refills:  0       oxyCODONE IR 5 MG tablet   Commonly known as:  ROXICODONE   Used for:  Rectal foreign body, initial encounter        Dose:  5 mg   Take 1 tablet (5 mg) by mouth every 3 hours as needed for moderate to severe pain or other (pain control or improvement in physical function. Hold dose for analgesic side effects.)   Quantity:  5 tablet   Refills:  0         CONTINUE these medicines which have NOT CHANGED        Dose / Directions    CLONIDINE HCL PO        Dose:  0.1 mg   Take 0.1 mg by mouth 2 times daily   Refills:  0       levonorgestrel 20 MCG/24HR IUD   Commonly known as:  MIRENA        Dose:  1 each   1 each (20 mcg) by Intrauterine route once for 1 dose   Refills:  0       lurasidone 20 MG Tabs tablet   Commonly known as:  LATUDA   Used for:  Borderline personality disorder, Severe episode of recurrent major depressive disorder, without psychotic features (H)        Dose:  40 mg   Take 2 tablets (40 mg) by mouth every evening   Quantity:  12 tablet   Refills:  0       MELATONIN PO        Dose:  3 mg   Take 3 mg by mouth nightly as needed (sleep)   Refills:  0       PRISTIQ PO        Dose:  100 mg   Take 100 mg by mouth every morning   Refills:  0        VITAMIN D3 PO        Dose:  2000 Units   Take 2,000 Units by mouth every morning   Refills:  0         STOP taking     ondansetron 4 MG ODT tab   Commonly known as:  ZOFRAN-ODT                Where to get your medicines      Some of these will need a paper prescription and others can be bought over the counter. Ask your nurse if you have questions.     Bring a paper prescription for each of these medications     acetaminophen 325 MG tablet    oxyCODONE IR 5 MG tablet                Protect others around you: Learn how to safely use, store and throw away your medicines at www.disposemymeds.org.        Information about OPIOIDS     PRESCRIPTION OPIOIDS: WHAT YOU NEED TO KNOW   We gave you an opioid (narcotic) pain medicine. It is important to manage your pain, but opioids are not always the best choice. You should first try all the other options your care team gave you. Take this medicine for as short a time (and as few doses) as possible.     These medicines have risks:    DO NOT drive when on new or higher doses of pain medicine. These medicines can affect your alertness and reaction times, and you could be arrested for driving under the influence (DUI). If you need to use opioids long-term, talk to your care team about driving.    DO NOT operate heave machinery    DO NOT do any other dangerous activities while taking these medicines.     DO NOT drink any alcohol while taking these medicines.      If the opioid prescribed includes acetaminophen, DO NOT take with any other medicines that contain acetaminophen. Read all labels carefully. Look for the word  acetaminophen  or  Tylenol.  Ask your pharmacist if you have questions or are unsure.    You can get addicted to pain medicines, especially if you have a history of addiction (chemical, alcohol or substance dependence). Talk to your care team about ways to reduce this risk.    Store your pills in a secure place, locked if possible. We will not replace any lost or  stolen medicine. If you don t finish your medicine, please throw away (dispose) as directed by your pharmacist. The [place] Pollution Control Agency has more information about safe disposal: https://www.pca.[facility].mn.us/living-green/managing-unwanted-medications.     All opioids tend to cause constipation. Drink plenty of water and eat foods that have a lot of fiber, such as fruits, vegetables, prune juice, apple juice and high-fiber cereal. Take a laxative (Miralax, milk of magnesia, Colace, Senna) if you don t move your bowels at least every other day.              Medication List: This is a list of all your medications and when to take them. Check marks below indicate your daily home schedule. Keep this list as a reference.      Medications           Morning Afternoon Evening Bedtime As Needed    acetaminophen 325 MG tablet   Commonly known as:  TYLENOL   Take 2 tablets (650 mg) by mouth every 8 hours   Last time this was given:  975 mg on 7/19/2018  7:55 AM                                CLONIDINE HCL PO   Take 0.1 mg by mouth 2 times daily                                levonorgestrel 20 MCG/24HR IUD   Commonly known as:  MIRENA   1 each (20 mcg) by Intrauterine route once for 1 dose                                lurasidone 20 MG Tabs tablet   Commonly known as:  LATUDA   Take 2 tablets (40 mg) by mouth every evening                                MELATONIN PO   Take 3 mg by mouth nightly as needed (sleep)                                oxyCODONE IR 5 MG tablet   Commonly known as:  ROXICODONE   Take 1 tablet (5 mg) by mouth every 3 hours as needed for moderate to severe pain or other (pain control or improvement in physical function. Hold dose for analgesic side effects.)   Last time this was given:  10 mg on 7/19/2018  7:55 AM                                PRISTIQ PO   Take 100 mg by mouth every morning   Last time this was given:  100 mg on 7/19/2018 10:33 AM                                VITAMIN D3 PO    Take 2,000 Units by mouth every morning   Last time this was given:  2,000 Units on 7/19/2018  7:45 AM

## 2018-07-19 ENCOUNTER — ANESTHESIA EVENT (OUTPATIENT)
Dept: SURGERY | Facility: CLINIC | Age: 28
DRG: 394 | End: 2018-07-19
Payer: MEDICARE

## 2018-07-19 ENCOUNTER — ANESTHESIA (OUTPATIENT)
Dept: SURGERY | Facility: CLINIC | Age: 28
DRG: 394 | End: 2018-07-19
Payer: MEDICARE

## 2018-07-19 VITALS
DIASTOLIC BLOOD PRESSURE: 63 MMHG | TEMPERATURE: 96.9 F | WEIGHT: 250 LBS | RESPIRATION RATE: 14 BRPM | BODY MASS INDEX: 46.01 KG/M2 | OXYGEN SATURATION: 95 % | HEIGHT: 62 IN | HEART RATE: 92 BPM | SYSTOLIC BLOOD PRESSURE: 130 MMHG

## 2018-07-19 LAB
ANION GAP SERPL CALCULATED.3IONS-SCNC: 10 MMOL/L (ref 3–14)
BASOPHILS # BLD AUTO: 0 10E9/L (ref 0–0.2)
BASOPHILS NFR BLD AUTO: 0.2 %
BUN SERPL-MCNC: 9 MG/DL (ref 7–30)
CALCIUM SERPL-MCNC: 8.6 MG/DL (ref 8.5–10.1)
CHLORIDE SERPL-SCNC: 107 MMOL/L (ref 94–109)
CO2 SERPL-SCNC: 26 MMOL/L (ref 20–32)
CREAT SERPL-MCNC: 0.58 MG/DL (ref 0.52–1.04)
CREAT SERPL-MCNC: 0.63 MG/DL (ref 0.52–1.04)
DIFFERENTIAL METHOD BLD: ABNORMAL
EOSINOPHIL # BLD AUTO: 0.2 10E9/L (ref 0–0.7)
EOSINOPHIL NFR BLD AUTO: 2.5 %
ERYTHROCYTE [DISTWIDTH] IN BLOOD BY AUTOMATED COUNT: 13.4 % (ref 10–15)
GFR SERPL CREATININE-BSD FRML MDRD: >90 ML/MIN/1.7M2
GFR SERPL CREATININE-BSD FRML MDRD: >90 ML/MIN/1.7M2
GLUCOSE SERPL-MCNC: 101 MG/DL (ref 70–99)
HCT VFR BLD AUTO: 33.6 % (ref 35–47)
HGB BLD-MCNC: 11.2 G/DL (ref 11.7–15.7)
IMM GRANULOCYTES # BLD: 0 10E9/L (ref 0–0.4)
IMM GRANULOCYTES NFR BLD: 0.1 %
INR PPP: 1.09 (ref 0.86–1.14)
INTERPRETATION ECG - MUSE: NORMAL
LYMPHOCYTES # BLD AUTO: 2.3 10E9/L (ref 0.8–5.3)
LYMPHOCYTES NFR BLD AUTO: 28.4 %
MCH RBC QN AUTO: 29.4 PG (ref 26.5–33)
MCHC RBC AUTO-ENTMCNC: 33.3 G/DL (ref 31.5–36.5)
MCV RBC AUTO: 88 FL (ref 78–100)
MONOCYTES # BLD AUTO: 0.4 10E9/L (ref 0–1.3)
MONOCYTES NFR BLD AUTO: 4.3 %
NEUTROPHILS # BLD AUTO: 5.2 10E9/L (ref 1.6–8.3)
NEUTROPHILS NFR BLD AUTO: 64.5 %
NRBC # BLD AUTO: 0 10*3/UL
NRBC BLD AUTO-RTO: 0 /100
PLATELET # BLD AUTO: 243 10E9/L (ref 150–450)
PLATELET # BLD AUTO: 251 10E9/L (ref 150–450)
POTASSIUM SERPL-SCNC: 3.6 MMOL/L (ref 3.4–5.3)
RBC # BLD AUTO: 3.81 10E12/L (ref 3.8–5.2)
SODIUM SERPL-SCNC: 143 MMOL/L (ref 133–144)
WBC # BLD AUTO: 8.1 10E9/L (ref 4–11)

## 2018-07-19 PROCEDURE — 12000008 ZZH R&B INTERMEDIATE UMMC

## 2018-07-19 PROCEDURE — 71000015 ZZH RECOVERY PHASE 1 LEVEL 2 EA ADDTL HR: Performed by: COLON & RECTAL SURGERY

## 2018-07-19 PROCEDURE — 85610 PROTHROMBIN TIME: CPT | Performed by: EMERGENCY MEDICINE

## 2018-07-19 PROCEDURE — 85025 COMPLETE CBC W/AUTO DIFF WBC: CPT | Performed by: EMERGENCY MEDICINE

## 2018-07-19 PROCEDURE — 82565 ASSAY OF CREATININE: CPT | Performed by: SURGERY

## 2018-07-19 PROCEDURE — 36000047 ZZH SURGERY LEVEL 1 EA 15 ADDTL MIN - UMMC: Performed by: COLON & RECTAL SURGERY

## 2018-07-19 PROCEDURE — 25000128 H RX IP 250 OP 636: Performed by: EMERGENCY MEDICINE

## 2018-07-19 PROCEDURE — 0DJD8ZZ INSPECTION OF LOWER INTESTINAL TRACT, VIA NATURAL OR ARTIFICIAL OPENING ENDOSCOPIC: ICD-10-PCS | Performed by: COLON & RECTAL SURGERY

## 2018-07-19 PROCEDURE — A9270 NON-COVERED ITEM OR SERVICE: HCPCS | Mod: GY | Performed by: SURGERY

## 2018-07-19 PROCEDURE — 71000014 ZZH RECOVERY PHASE 1 LEVEL 2 FIRST HR: Performed by: COLON & RECTAL SURGERY

## 2018-07-19 PROCEDURE — 80048 BASIC METABOLIC PNL TOTAL CA: CPT | Performed by: EMERGENCY MEDICINE

## 2018-07-19 PROCEDURE — 25000566 ZZH SEVOFLURANE, EA 15 MIN: Performed by: COLON & RECTAL SURGERY

## 2018-07-19 PROCEDURE — 25000132 ZZH RX MED GY IP 250 OP 250 PS 637: Mod: GY | Performed by: PHYSICIAN ASSISTANT

## 2018-07-19 PROCEDURE — 37000009 ZZH ANESTHESIA TECHNICAL FEE, EACH ADDTL 15 MIN: Performed by: COLON & RECTAL SURGERY

## 2018-07-19 PROCEDURE — 25000132 ZZH RX MED GY IP 250 OP 250 PS 637: Mod: GY | Performed by: EMERGENCY MEDICINE

## 2018-07-19 PROCEDURE — 36000045 ZZH SURGERY LEVEL 1 1ST 30 MIN - UMMC: Performed by: COLON & RECTAL SURGERY

## 2018-07-19 PROCEDURE — 25000132 ZZH RX MED GY IP 250 OP 250 PS 637: Mod: GY | Performed by: SURGERY

## 2018-07-19 PROCEDURE — 37000008 ZZH ANESTHESIA TECHNICAL FEE, 1ST 30 MIN: Performed by: COLON & RECTAL SURGERY

## 2018-07-19 PROCEDURE — 99222 1ST HOSP IP/OBS MODERATE 55: CPT | Performed by: PSYCHIATRY & NEUROLOGY

## 2018-07-19 PROCEDURE — A9270 NON-COVERED ITEM OR SERVICE: HCPCS | Mod: GY | Performed by: PHYSICIAN ASSISTANT

## 2018-07-19 PROCEDURE — C9399 UNCLASSIFIED DRUGS OR BIOLOG: HCPCS | Performed by: STUDENT IN AN ORGANIZED HEALTH CARE EDUCATION/TRAINING PROGRAM

## 2018-07-19 PROCEDURE — 0DCP7ZZ EXTIRPATION OF MATTER FROM RECTUM, VIA NATURAL OR ARTIFICIAL OPENING: ICD-10-PCS | Performed by: COLON & RECTAL SURGERY

## 2018-07-19 PROCEDURE — 25000125 ZZHC RX 250: Performed by: STUDENT IN AN ORGANIZED HEALTH CARE EDUCATION/TRAINING PROGRAM

## 2018-07-19 PROCEDURE — 25000128 H RX IP 250 OP 636

## 2018-07-19 PROCEDURE — 25000128 H RX IP 250 OP 636: Performed by: STUDENT IN AN ORGANIZED HEALTH CARE EDUCATION/TRAINING PROGRAM

## 2018-07-19 PROCEDURE — 36415 COLL VENOUS BLD VENIPUNCTURE: CPT | Performed by: SURGERY

## 2018-07-19 PROCEDURE — A9270 NON-COVERED ITEM OR SERVICE: HCPCS | Mod: GY | Performed by: EMERGENCY MEDICINE

## 2018-07-19 PROCEDURE — 27210794 ZZH OR GENERAL SUPPLY STERILE: Performed by: COLON & RECTAL SURGERY

## 2018-07-19 PROCEDURE — 25000128 H RX IP 250 OP 636: Performed by: SURGERY

## 2018-07-19 PROCEDURE — 85049 AUTOMATED PLATELET COUNT: CPT | Performed by: SURGERY

## 2018-07-19 RX ORDER — ONDANSETRON 4 MG/1
4 TABLET, ORALLY DISINTEGRATING ORAL EVERY 30 MIN PRN
Status: DISCONTINUED | OUTPATIENT
Start: 2018-07-19 | End: 2018-07-19 | Stop reason: HOSPADM

## 2018-07-19 RX ORDER — DIPHENHYDRAMINE HYDROCHLORIDE 50 MG/ML
25 INJECTION INTRAMUSCULAR; INTRAVENOUS EVERY 6 HOURS PRN
Status: DISCONTINUED | OUTPATIENT
Start: 2018-07-19 | End: 2018-07-19

## 2018-07-19 RX ORDER — FENTANYL CITRATE 50 UG/ML
25-50 INJECTION, SOLUTION INTRAMUSCULAR; INTRAVENOUS
Status: DISCONTINUED | OUTPATIENT
Start: 2018-07-19 | End: 2018-07-19 | Stop reason: HOSPADM

## 2018-07-19 RX ORDER — PROCHLORPERAZINE MALEATE 5 MG
10 TABLET ORAL EVERY 6 HOURS PRN
Status: DISCONTINUED | OUTPATIENT
Start: 2018-07-19 | End: 2018-07-19 | Stop reason: HOSPADM

## 2018-07-19 RX ORDER — ONDANSETRON 2 MG/ML
4 INJECTION INTRAMUSCULAR; INTRAVENOUS EVERY 30 MIN PRN
Status: DISCONTINUED | OUTPATIENT
Start: 2018-07-19 | End: 2018-07-19 | Stop reason: HOSPADM

## 2018-07-19 RX ORDER — ONDANSETRON 2 MG/ML
INJECTION INTRAMUSCULAR; INTRAVENOUS PRN
Status: DISCONTINUED | OUTPATIENT
Start: 2018-07-19 | End: 2018-07-19

## 2018-07-19 RX ORDER — DIPHENHYDRAMINE HCL 25 MG
25 CAPSULE ORAL EVERY 6 HOURS PRN
Status: DISCONTINUED | OUTPATIENT
Start: 2018-07-19 | End: 2018-07-19 | Stop reason: HOSPADM

## 2018-07-19 RX ORDER — PROPOFOL 10 MG/ML
INJECTION, EMULSION INTRAVENOUS PRN
Status: DISCONTINUED | OUTPATIENT
Start: 2018-07-19 | End: 2018-07-19

## 2018-07-19 RX ORDER — LURASIDONE HYDROCHLORIDE 40 MG/1
40 TABLET, FILM COATED ORAL EVERY EVENING
Status: DISCONTINUED | OUTPATIENT
Start: 2018-07-19 | End: 2018-07-19 | Stop reason: HOSPADM

## 2018-07-19 RX ORDER — AMOXICILLIN 250 MG
2 CAPSULE ORAL 2 TIMES DAILY
Status: DISCONTINUED | OUTPATIENT
Start: 2018-07-19 | End: 2018-07-19 | Stop reason: HOSPADM

## 2018-07-19 RX ORDER — OXYCODONE HYDROCHLORIDE 5 MG/1
5-10 TABLET ORAL
Status: DISCONTINUED | OUTPATIENT
Start: 2018-07-19 | End: 2018-07-19 | Stop reason: HOSPADM

## 2018-07-19 RX ORDER — LIDOCAINE 40 MG/G
CREAM TOPICAL
Status: DISCONTINUED | OUTPATIENT
Start: 2018-07-19 | End: 2018-07-19 | Stop reason: HOSPADM

## 2018-07-19 RX ORDER — LIDOCAINE HYDROCHLORIDE 20 MG/ML
INJECTION, SOLUTION INFILTRATION; PERINEURAL PRN
Status: DISCONTINUED | OUTPATIENT
Start: 2018-07-19 | End: 2018-07-19

## 2018-07-19 RX ORDER — AMOXICILLIN 250 MG
1 CAPSULE ORAL 2 TIMES DAILY
Status: DISCONTINUED | OUTPATIENT
Start: 2018-07-19 | End: 2018-07-19 | Stop reason: HOSPADM

## 2018-07-19 RX ORDER — SODIUM CHLORIDE, SODIUM LACTATE, POTASSIUM CHLORIDE, CALCIUM CHLORIDE 600; 310; 30; 20 MG/100ML; MG/100ML; MG/100ML; MG/100ML
INJECTION, SOLUTION INTRAVENOUS CONTINUOUS
Status: DISCONTINUED | OUTPATIENT
Start: 2018-07-19 | End: 2018-07-19 | Stop reason: HOSPADM

## 2018-07-19 RX ORDER — HYDROMORPHONE HYDROCHLORIDE 1 MG/ML
.3-.5 INJECTION, SOLUTION INTRAMUSCULAR; INTRAVENOUS; SUBCUTANEOUS EVERY 5 MIN PRN
Status: DISCONTINUED | OUTPATIENT
Start: 2018-07-19 | End: 2018-07-19 | Stop reason: HOSPADM

## 2018-07-19 RX ORDER — LANOLIN ALCOHOL/MO/W.PET/CERES
3 CREAM (GRAM) TOPICAL
Status: DISCONTINUED | OUTPATIENT
Start: 2018-07-19 | End: 2018-07-19 | Stop reason: HOSPADM

## 2018-07-19 RX ORDER — ONDANSETRON 2 MG/ML
4 INJECTION INTRAMUSCULAR; INTRAVENOUS EVERY 6 HOURS PRN
Status: DISCONTINUED | OUTPATIENT
Start: 2018-07-19 | End: 2018-07-19 | Stop reason: HOSPADM

## 2018-07-19 RX ORDER — OXYCODONE HYDROCHLORIDE 5 MG/1
5 TABLET ORAL
Qty: 5 TABLET | Refills: 0 | Status: SHIPPED | OUTPATIENT
Start: 2018-07-19 | End: 2018-07-31

## 2018-07-19 RX ORDER — NALOXONE HYDROCHLORIDE 0.4 MG/ML
.1-.4 INJECTION, SOLUTION INTRAMUSCULAR; INTRAVENOUS; SUBCUTANEOUS
Status: DISCONTINUED | OUTPATIENT
Start: 2018-07-19 | End: 2018-07-19

## 2018-07-19 RX ORDER — DESVENLAFAXINE 50 MG/1
100 TABLET, FILM COATED, EXTENDED RELEASE ORAL EVERY MORNING
Status: DISCONTINUED | OUTPATIENT
Start: 2018-07-19 | End: 2018-07-19 | Stop reason: HOSPADM

## 2018-07-19 RX ORDER — CLONIDINE HYDROCHLORIDE 0.1 MG/1
0.05 TABLET ORAL 2 TIMES DAILY
Status: DISCONTINUED | OUTPATIENT
Start: 2018-07-19 | End: 2018-07-19 | Stop reason: HOSPADM

## 2018-07-19 RX ORDER — ONDANSETRON 4 MG/1
4 TABLET, ORALLY DISINTEGRATING ORAL EVERY 6 HOURS PRN
Status: DISCONTINUED | OUTPATIENT
Start: 2018-07-19 | End: 2018-07-19 | Stop reason: HOSPADM

## 2018-07-19 RX ORDER — HYDROMORPHONE HYDROCHLORIDE 1 MG/ML
.3-.5 INJECTION, SOLUTION INTRAMUSCULAR; INTRAVENOUS; SUBCUTANEOUS
Status: DISCONTINUED | OUTPATIENT
Start: 2018-07-19 | End: 2018-07-19

## 2018-07-19 RX ORDER — ACETAMINOPHEN 325 MG/1
650 TABLET ORAL EVERY 4 HOURS PRN
Status: DISCONTINUED | OUTPATIENT
Start: 2018-07-22 | End: 2018-07-19

## 2018-07-19 RX ORDER — CLONIDINE HYDROCHLORIDE 0.1 MG/1
0.05 TABLET ORAL 2 TIMES DAILY
Status: CANCELLED | OUTPATIENT
Start: 2018-07-19

## 2018-07-19 RX ORDER — ACETAMINOPHEN 325 MG/1
975 TABLET ORAL EVERY 8 HOURS
Status: DISCONTINUED | OUTPATIENT
Start: 2018-07-19 | End: 2018-07-19 | Stop reason: HOSPADM

## 2018-07-19 RX ORDER — NALOXONE HYDROCHLORIDE 0.4 MG/ML
.1-.4 INJECTION, SOLUTION INTRAMUSCULAR; INTRAVENOUS; SUBCUTANEOUS
Status: DISCONTINUED | OUTPATIENT
Start: 2018-07-19 | End: 2018-07-19 | Stop reason: HOSPADM

## 2018-07-19 RX ORDER — ONDANSETRON 2 MG/ML
4 INJECTION INTRAMUSCULAR; INTRAVENOUS ONCE
Status: COMPLETED | OUTPATIENT
Start: 2018-07-19 | End: 2018-07-19

## 2018-07-19 RX ORDER — OXYCODONE HYDROCHLORIDE 5 MG/1
5 TABLET ORAL ONCE
Status: COMPLETED | OUTPATIENT
Start: 2018-07-19 | End: 2018-07-19

## 2018-07-19 RX ORDER — CLONIDINE HYDROCHLORIDE 0.1 MG/1
0.1 TABLET ORAL 2 TIMES DAILY
Status: DISCONTINUED | OUTPATIENT
Start: 2018-07-19 | End: 2018-07-19

## 2018-07-19 RX ORDER — ACETAMINOPHEN 325 MG/1
650 TABLET ORAL EVERY 8 HOURS
Qty: 10 TABLET | Refills: 0 | Status: SHIPPED | OUTPATIENT
Start: 2018-07-19 | End: 2019-02-19

## 2018-07-19 RX ORDER — LABETALOL HYDROCHLORIDE 5 MG/ML
10 INJECTION, SOLUTION INTRAVENOUS
Status: DISCONTINUED | OUTPATIENT
Start: 2018-07-19 | End: 2018-07-19 | Stop reason: HOSPADM

## 2018-07-19 RX ORDER — DEXAMETHASONE SODIUM PHOSPHATE 4 MG/ML
INJECTION, SOLUTION INTRA-ARTICULAR; INTRALESIONAL; INTRAMUSCULAR; INTRAVENOUS; SOFT TISSUE PRN
Status: DISCONTINUED | OUTPATIENT
Start: 2018-07-19 | End: 2018-07-19

## 2018-07-19 RX ADMIN — DIPHENHYDRAMINE HYDROCHLORIDE 25 MG: 50 INJECTION, SOLUTION INTRAMUSCULAR; INTRAVENOUS at 06:48

## 2018-07-19 RX ADMIN — ONDANSETRON 4 MG: 2 INJECTION INTRAMUSCULAR; INTRAVENOUS at 04:00

## 2018-07-19 RX ADMIN — Medication 0.5 MG: at 05:16

## 2018-07-19 RX ADMIN — VITAMIN D, TAB 1000IU (100/BT) 2000 UNITS: 25 TAB at 07:45

## 2018-07-19 RX ADMIN — SUGAMMADEX 400 MG: 100 INJECTION, SOLUTION INTRAVENOUS at 04:06

## 2018-07-19 RX ADMIN — SENNOSIDES AND DOCUSATE SODIUM 2 TABLET: 8.6; 5 TABLET ORAL at 07:45

## 2018-07-19 RX ADMIN — DEXAMETHASONE SODIUM PHOSPHATE 4 MG: 4 INJECTION, SOLUTION INTRA-ARTICULAR; INTRALESIONAL; INTRAMUSCULAR; INTRAVENOUS; SOFT TISSUE at 04:00

## 2018-07-19 RX ADMIN — PROCHLORPERAZINE EDISYLATE 10 MG: 5 INJECTION INTRAMUSCULAR; INTRAVENOUS at 07:56

## 2018-07-19 RX ADMIN — ONDANSETRON 4 MG: 2 INJECTION, SOLUTION INTRAMUSCULAR; INTRAVENOUS at 03:16

## 2018-07-19 RX ADMIN — Medication 100 MG: at 03:56

## 2018-07-19 RX ADMIN — Medication 0.5 MG: at 05:24

## 2018-07-19 RX ADMIN — Medication 0.05 MG: at 12:45

## 2018-07-19 RX ADMIN — FENTANYL CITRATE 50 MCG: 50 INJECTION, SOLUTION INTRAMUSCULAR; INTRAVENOUS at 04:45

## 2018-07-19 RX ADMIN — HYDROMORPHONE HYDROCHLORIDE 0.3 MG: 1 INJECTION, SOLUTION INTRAMUSCULAR; INTRAVENOUS; SUBCUTANEOUS at 12:45

## 2018-07-19 RX ADMIN — Medication 0.5 MG: at 04:59

## 2018-07-19 RX ADMIN — ROCURONIUM BROMIDE 50 MG: 10 INJECTION INTRAVENOUS at 04:00

## 2018-07-19 RX ADMIN — Medication 0.5 MG: at 04:49

## 2018-07-19 RX ADMIN — OXYCODONE HYDROCHLORIDE 5 MG: 5 TABLET ORAL at 02:25

## 2018-07-19 RX ADMIN — LIDOCAINE HYDROCHLORIDE 60 MG: 20 INJECTION, SOLUTION INFILTRATION; PERINEURAL at 03:56

## 2018-07-19 RX ADMIN — FENTANYL CITRATE 25 MCG: 50 INJECTION, SOLUTION INTRAMUSCULAR; INTRAVENOUS at 04:34

## 2018-07-19 RX ADMIN — ONDANSETRON 4 MG: 2 INJECTION, SOLUTION INTRAMUSCULAR; INTRAVENOUS at 05:32

## 2018-07-19 RX ADMIN — DESVENLAFAXINE SUCCINATE 100 MG: 50 TABLET, EXTENDED RELEASE ORAL at 10:33

## 2018-07-19 RX ADMIN — FENTANYL CITRATE 25 MCG: 50 INJECTION, SOLUTION INTRAMUSCULAR; INTRAVENOUS at 04:30

## 2018-07-19 RX ADMIN — ACETAMINOPHEN 975 MG: 325 TABLET, FILM COATED ORAL at 07:55

## 2018-07-19 RX ADMIN — OXYCODONE HYDROCHLORIDE 10 MG: 5 TABLET ORAL at 07:55

## 2018-07-19 RX ADMIN — PROPOFOL 200 MG: 10 INJECTION, EMULSION INTRAVENOUS at 03:56

## 2018-07-19 ASSESSMENT — PAIN DESCRIPTION - DESCRIPTORS
DESCRIPTORS: ACHING
DESCRIPTORS: ACHING
DESCRIPTORS: SHARP
DESCRIPTORS: ACHING
DESCRIPTORS: SHARP

## 2018-07-19 ASSESSMENT — ACTIVITIES OF DAILY LIVING (ADL)
ADLS_ACUITY_SCORE: 11

## 2018-07-19 NOTE — ANESTHESIA POSTPROCEDURE EVALUATION
Patient: Nevin Alvarado    Procedure(s):  EXAM UNDER ANESTHESIA, REMOVAL OF RECTAL FOREIGN BODY - Wound Class: I-Clean    Diagnosis:EX LAP  Diagnosis Additional Information: No value filed.    Anesthesia Type:  No value filed.    Note:  Anesthesia Post Evaluation    Patient location during evaluation: PACU  Patient participation: Able to fully participate in evaluation  Level of consciousness: awake  Pain management: adequate  Airway patency: patent  Cardiovascular status: acceptable  Respiratory status: acceptable  Hydration status: acceptable  PONV: none             Last vitals:  Vitals:    07/18/18 2144 07/19/18 0203 07/19/18 0415   BP: 157/88 136/68 120/53   Pulse: 88     Resp:   14   Temp: 36.9  C (98.5  F)  36.6  C (97.8  F)   SpO2: 98% 98% 98%         Electronically Signed By: Xavier Francisco MD  July 19, 2018  4:34 AM

## 2018-07-19 NOTE — PLAN OF CARE
Problem: Patient Care Overview  Goal: Plan of Care/Patient Progress Review  Outcome: Adequate for Discharge Date Met: 07/19/18  Pt discharged to home via cab. Discharge instructions discussed with Pt and paperwork signed. Medications picked up at discharge pharmacy. PIV removed. All belongings with Pt.

## 2018-07-19 NOTE — PLAN OF CARE
Problem: Patient Care Overview  Goal: Plan of Care/Patient Progress Review  Outcome: No Change  Arrived from PACU @ 0600.  All VSS.  C/o rectal pain.  Able to ambulate to BR with assist of 2 and void.  Stated was slightly dizzy.  No inc made in the OR.  Old bloody drainage present in scant amounts at anal opening.  Moving self in bed.  C/o itching, no rash noted.  Benadryl given.  Would like compazine when able to give around 0730 if still nauseated.  Receieved 0.5 mg of IV dilaudid last in PACU @ 0525.  Would like oxycodone given as soon as nausea resolved.  No flatus of stool since OR.  Last BM was Monday.  Concerned about home meds being given.  Will need to review with patient this am.  Continue with POD 1 cares.

## 2018-07-19 NOTE — ANESTHESIA CARE TRANSFER NOTE
Patient: Nevin Alvarado    Procedure(s):  EXAM UNDER ANESTHESIA, REMOVAL OF RECTAL FOREIGN BODY - Wound Class: I-Clean    Diagnosis: EX LAP  Diagnosis Additional Information: No value filed.    Anesthesia Type:   No value filed.     Note:  Airway :Face Mask  Patient transferred to:PACU  Comments: Vss, sats 100% on facemask, No pain on extubation, No adverse anesthesia events.          Vitals: (Last set prior to Anesthesia Care Transfer)    CRNA VITALS  7/19/2018 0346 - 7/19/2018 0422      7/19/2018             Resp Rate (observed): (!)  6                Electronically Signed By: Steph Espana MD  July 19, 2018  4:22 AM

## 2018-07-19 NOTE — CONSULTS
"Consult Date:  07/19/2018      PSYCHIATRIC CONSULTATION      IDENTIFICATION:  Ms. Alvarado is a 27-year-old single white female who has a long history of foreign object ingestions and objects per rectum.  I am asked to evaluate her psychiatric status by  Dr. Dotson.      Prior to interviewing this patient, I had an opportunity to review the electronic medical record, in particular a psychiatric consultation completed by Dr. Barney Randall on 04/17/2018.  That consultation documents a 4-year history of \"too many hospitalizations to count\" and suggests that since the beginning of 2018, the patient had had 17 Emergency Department visits or hospital admissions by 04/17.  By then, she had been seen at [facility], [facility] and [facility].      In reviewing Care Everywhere, it appears that since her visit here in April she has had presentations to [facility], with a foreign body per vagina and an emergency room visit in [facility] for foreign body in rectum.  She had a second [facility] visit for a foreign body in rectum on 05/20.  She had an anesthesia event for removal of a foreign body on 05/24 at [facility].  On 05/29, she had a foreign body in her stomach which were 2 metallic 2-3 inch straight foreign objects and there was a small spring found in her distal esophagus on 06/03/2018.  There was an intentional drug overdose on 06/11 in the [facility] system, swallowed foreign body at [facility] on 06/23, a drug overdose on 06/25 at [facility] and another intentional overdose at [facility] 06/29.  She continued to have hospitalizations in July with a foreign body on 07/03 and a foreign body noted at [facility] on 07/15, so she clearly has been a high utilizer of emergency rooms and hospitals.      This patient tells me that she will be moving to a more supportive living situation, which may well be beneficial.  It appears that her utilization of emergency rooms and hospitals " has gone up since leaving a group home setting and going to her own apartment, so more supervision would likely be a very good idea.      The patient is currently being treated with Latuda and Pristiq, which she feels are helpful.  She has had a variety of medication regimens in the past.  She receives psychotherapy and gets a variety of outpatient services.  From what I can tell, her psychiatric care is mainly at [facility] and one might consider restricting her care to [facility] in the future.  If after she gets in a more supervised environment, this amount of ingestion continues, one could certainly consider placement at an intensive residential treatment center.      On interview today, the patient reports that she was not suicidal.  She says her mood is actually pretty good.  She has been taking her medications and intends to follow up at [facility].      PAST MEDICAL HISTORY:  Includes a variety of psychiatric diagnoses.  The most compelling diagnosis seems to be severe borderline personality disorder.  She also has a history of migraine, moderate obesity suicidal ideation, syncope, multiple episodes of foreign body ingestion, overdoses and rectal foreign bodies.      ALLERGIES:  She has allergies to:     1.  PENICILLINS.     2.  BLOOD GROUP-SPECIFIC SUBSTANCE.    3.  HYDROCODONE.    4.  INFLUENZA VACCINE.     5.  OSELTAMIVIR.   6.  VICODIN.    7.  CEPHALOSPORINS.    8.  LAMOTRIGINE.   9.  LATEX.        FAMILY HISTORY: There is depression and anxiety on the maternal side of the family.  The chart suggest no substance use problems.      SOCIAL HISTORY:  The patient grew up in the [place] area.  She did graduate high school and has worked on and off.  She currently resides in her own apartment where she tends to isolate.  She tells me she will be moving to a more supervised apartment.  In the past, she has had various outpatient services including [facility].        PHYSICAL REVIEW OF  SYSTEMS:  On my interview, the patient denied current headache.  She denied problems with vision or hearing.  She denied chest pain, shortness of breath, abdominal pain, diarrhea or constipation, genitourinary symptoms or problems with muscles, skin or joints.  She denied endocrinopathy.      MENTAL STATUS EXAMINATION:  On my interview, the patient was lying in bed.  She still had nasal cannula oxygen.  She reports her mood was pretty good.  Her affect was restricted.  Her speech was soft, but coherent and goal oriented.  Her associations tight.  Her thought processes were logical and linear.  Content of thought was without overt psychosis or ongoing suicidal ideation.  Recent and remote memory, concentration, fund of knowledge and use of language appear to be at baseline.  She is alert and oriented x 3.  Insight and judgment are guarded.  Muscle strength and tone appear to be at baseline.  Recent vital signs include a temperature of 96.9, pulse of 88, respiration rate of 15 with 94% oxygen saturation and a blood pressure of 143/90.      ASSESSMENT:  Borderline personality disorder.  She has carried a variety of other psychiatric diagnoses.      RECOMMENDATION:  The patient is okay to discharge home.  I certainly agree that a more supervised environment would likely be beneficial.  In the future, one could consider restricting her care to [facility] if that is where she will be getting the bulk of her psychiatric care.         ANAMIKA CALLAHAN MD             D: 2018   T: 2018   MT: MONA      Name:     ABAD TONG   MRN:      [id number]        Account:       [id number]   :      1990           Consult Date:  2018      Document: [id number]       cc: LOUISA ELMORE MD

## 2018-07-19 NOTE — H&P
Colorectal Surgery Consultation Note    Nevin Alvarado  MRN: [id number]  female  27 year old    Chief Complaint:  Foreign body x2 in rectum    HPI:  27 year old female w/ complex psych Hx including BPD, h/o self-harm (including foreign body ingestion, rectal foreign body, intentional OD), anxiety, MDD, p/w foreign body x2 in her rectum - she was trying to pleasure herself several hours ago by inserting two glass bottles of essential oils into her rectum. Attempted to remove them herself but was unable to do so. Also c/o mild lower abd pain; has not been able to pass gas or stool since. Denies fevers/chills, pain in rectum, blood per rectum. States she has had 3 prior episodes which all required surgical intervention - most recently, she had EUA + laparotomy at [facility] in January (pt also received care here - 1/10/2017 ex lap w/ manual manipulation of foreign body w/ extraction per anus, no enterotomy/bowel resection, Dr. Haynes).  Of note, pt has been seen and evaluated in multiple EDs 22x in the past 1.5mo for abd pain, ODs, swallowed foreign bodies. States she has been compliant with her medications.    Patient Active Problem List   Diagnosis     Migraine without aura     Depression     Borderline personality disorder     Anxiety     H/O self-harm     ADD (attention deficit disorder)     PTSD (post-traumatic stress disorder)     Morbid obesity (H)     Rectal foreign body     Suicidal intent     Suicidal ideation     Syncope     Foreign body ingestion     Ingestion of foreign body     Major depressive disorder     Past Surgical History:   Past Surgical History:   Procedure Laterality Date     ESOPHAGOSCOPY, GASTROSCOPY, DUODENOSCOPY (EGD), COMBINED N/A 3/9/2017    Procedure: COMBINED ESOPHAGOSCOPY, GASTROSCOPY, DUODENOSCOPY (EGD), REMOVE FOREIGN BODY;  Surgeon: Avis Guzmán MD;  Location:  OR     ESOPHAGOSCOPY, GASTROSCOPY, DUODENOSCOPY (EGD), COMBINED N/A 4/20/2017    Procedure:  COMBINED ESOPHAGOSCOPY, GASTROSCOPY, DUODENOSCOPY (EGD), REMOVE FOREIGN BODY;  EGD removal Foregin body;  Surgeon: Lokesh Paula MD;  Location: [facility] OR     ESOPHAGOSCOPY, GASTROSCOPY, DUODENOSCOPY (EGD), COMBINED N/A 6/12/2017    Procedure: COMBINED ESOPHAGOSCOPY, GASTROSCOPY, DUODENOSCOPY (EGD);  COMBINED ESOPHAGOSCOPY, GASTROSCOPY, DUODENOSCOPY (EGD) [[id number]]attempted removal of foreign body;  Surgeon: Pamela Perez MD;  Location: [facility] OR     ESOPHAGOSCOPY, GASTROSCOPY, DUODENOSCOPY (EGD), COMBINED N/A 6/9/2017    Procedure: COMBINED ESOPHAGOSCOPY, GASTROSCOPY, DUODENOSCOPY (EGD), REMOVE FOREIGN BODY;  Esophagoscopy, Gastroscopy, Duodenoscopy, Removal of Foreign Body;  Surgeon: Dejon Marsh MD;  Location: [facility] OR     ESOPHAGOSCOPY, GASTROSCOPY, DUODENOSCOPY (EGD), COMBINED N/A 1/6/2018    Procedure: COMBINED ESOPHAGOSCOPY, GASTROSCOPY, DUODENOSCOPY (EGD), REMOVE FOREIGN BODY;  COMBINED ESOPHAGOSCOPY, GASTROSCOPY, DUODENOSCOPY (EGD) [by pascal net and snare with profol sedation;  Surgeon: Feliciano Emmanuel MD;  Location:  GI     ESOPHAGOSCOPY, GASTROSCOPY, DUODENOSCOPY (EGD), COMBINED N/A 3/19/2018    Procedure: COMBINED ESOPHAGOSCOPY, GASTROSCOPY, DUODENOSCOPY (EGD), REMOVE FOREIGN BODY;   Esophagodscopy, Gastroscopy, Duodenoscopy,Foreign Body Removal;  Surgeon: Brice Guzmán MD;  Location: [facility] OR     ESOPHAGOSCOPY, GASTROSCOPY, DUODENOSCOPY (EGD), COMBINED N/A 4/16/2018    Procedure: COMBINED ESOPHAGOSCOPY, GASTROSCOPY, DUODENOSCOPY (EGD), REMOVE FOREIGN BODY;  Esophagogastroduodenoscopy  Foreign Body Removal X 2;  Surgeon: Royer Moise MD;  Location: [facility] OR     ESOPHAGOSCOPY, GASTROSCOPY, DUODENOSCOPY (EGD), COMBINED N/A 6/1/2018    Procedure: COMBINED ESOPHAGOSCOPY, GASTROSCOPY, DUODENOSCOPY (EGD), REMOVE FOREIGN BODY;  COMBINED ESOPHAGOSCOPY, GASTROSCOPY, DUODENOSCOPY with removal of foreign body, propofol sedation by anesthesia;  Surgeon: Brice Martinez,  MD;  Location: [facility] GI     EXAM UNDER ANESTHESIA ANUS N/A 1/10/2017    Procedure: EXAM UNDER ANESTHESIA ANUS;  Surgeon: Annmarie Haynes MD;  Location: [facility] OR     HC REMOVE FECAL IMPACTION OR FB W ANESTHESIA N/A 12/18/2016    Procedure: REMOVE FECAL IMPACTION/FOREIGN BODY UNDER ANESTHESIA;  Surgeon: Soham Cano MD;  Location: [facility] OR     KNEE SURGERY - removed a small tissue mass from knee Right      LAPAROSCOPIC ABLATION ENDOMETRIOSIS       LAPAROTOMY EXPLORATORY N/A 1/10/2017    Procedure: LAPAROTOMY EXPLORATORY;  Surgeon: Annmarie Haynes MD;  Location: [facility] OR     lymph nodes removed from neck; benign  age 7     MAMMOPLASTY REDUCTION Bilateral      RELEASE CARPAL TUNNEL Bilateral      SIGMOIDOSCOPY FLEXIBLE N/A 1/10/2017    Procedure: SIGMOIDOSCOPY FLEXIBLE;  Surgeon: Annmarie Haynes MD;  Location: [facility] OR     SIGMOIDOSCOPY FLEXIBLE N/A 5/8/2018    Procedure: SIGMOIDOSCOPY FLEXIBLE;  flex sig with foreign body removal using snare and rattooth forcep;  Surgeon: Soham Cano MD;  Location: [facility] GI     Past Medical History:   Past Medical History:   Diagnosis Date     ADD (attention deficit disorder)      Anorexia nervosa with bulimia     history of; on Topamax     Anxiety      Borderline personality disorder      Depression      Depressive disorder      H/O self-harm      Lives in independent group home     due to debilitating mental illness     Migraine without aura     no known triggers; on Topamax bid and Imitrex PRN     Morbid obesity (H)      PTSD (post-traumatic stress disorder)      Rectal foreign body - Recurrent issue, self placed      Swallowed foreign body - Recurrent issue, self placed      Social History:   Social History   Substance Use Topics     Smoking status: Never Smoker     Smokeless tobacco: Never Used     Alcohol use No     Family History:   Family History   Problem Relation Age of Onset     Type 2 Diabetes Maternal Grandmother      Type 2 Diabetes Paternal  "Grandmother      Breast Cancer Paternal Grandmother      Cerebrovascular Disease Father 53     Myocardial Infarction No family hx of      Coronary Artery Disease Early Onset No family hx of      Ovarian Cancer No family hx of      Colon Cancer No family hx of       Allergies:   Allergies   Allergen Reactions     Penicillins Anaphylaxis     Blood-Group Specific Substance Other (See Comments)     Patient has an anti-Cw and non-specific antibodies. Blood product orders may be delayed. Draw one red top and two purple top tubes for all type/screen/crossmatch orders.     Hydrocodone Nausea and Vomiting     vomiting for days     Influenza Vaccines Other (See Comments)     Oseltamivir Hives     med stopped, PN: med stopped     Vicodin [Hydrocodone-Acetaminophen] Nausea and Vomiting     Cephalosporins Rash     Lamotrigine Rash     Possibly associated with Lamictal.      Latex Rash     Active Medications:   Current Outpatient Prescriptions   Medication Sig Dispense Refill     Cholecalciferol (VITAMIN D3 PO) Take 2,000 Units by mouth every morning        CLONIDINE HCL PO Take 0.1 mg by mouth 2 times daily       Desvenlafaxine Succinate (PRISTIQ PO) Take 100 mg by mouth every morning        levonorgestrel (MIRENA) 20 MCG/24HR IUD 1 each (20 mcg) by Intrauterine route once for 1 dose       lurasidone (LATUDA) 20 MG TABS tablet Take 2 tablets (40 mg) by mouth every evening 12 tablet 0     MELATONIN PO Take 3 mg by mouth nightly as needed (sleep)       ondansetron (ZOFRAN-ODT) 4 MG ODT tab Take 4 mg by mouth every 6 hours as needed       ROS:  Otherwise negative    Physical Examination:   Vital Signs: /88  Pulse 88  Temp 98.5  F (36.9  C) (Oral)  Ht 1.575 m (5' 2\")  Wt 112.9 kg (249 lb)  SpO2 98%  Breastfeeding? No  BMI 45.54 kg/m2  GEN: alert, no distress  EENT: normal  Chest: NLB on RA, regular rate  Abdomen: obese, soft, minimally tender lateral lower quadrants, non-distended  Rectum: no blood seen on external " exam; LACY was not performed  Extremities: WWP, no edema    Labs/Imaging/Other:  Results for orders placed or performed during the hospital encounter of 07/18/18 (from the past 24 hour(s))   XR Abdomen 1 View    Narrative    2 foreign bodies project over the intraluminal aspect of the rectum.  No pneumatosis intestinalis. No definitive evidence of  pneumoperitoneum, although sensitivity is decreased from supine  technique.       Assessment & Plan:  27 year old female w/ significant psych Hx p/w self-inflicted two glass foreign bodies in rectum. Due to increasing abd pain and precarious location and material of the foreign bodies, plan is for emergent OR for EUA and possible ex lap for extraction. Patient last PO intake at 5pm.      D/w fellow    Mary Guzman MD/MPH  Plastic Surgery PGY2      Staff Addendum:  Agree with the H&P as documented by the housestaff. I was personally involved with the recommendations made by our service for this patient.  Annmarie Haynes MD  Colon and Rectal Surgery Staff  [facility]

## 2018-07-19 NOTE — BRIEF OP NOTE
[facility], [place]    Brief Operative Note    Pre-operative diagnosis: Foreign body  Post-operative diagnosis Same  Procedure: Procedure(s):  EXAM UNDER ANESTHESIA, REMOVAL OF RECTAL FOREIGN BODY - Wound Class: I-Clean  Surgeon: Surgeon(s) and Role:     * Annmarie Haynes MD - Primary     * Sarah Thornton MD - Assisting  Anesthesia: General   Estimated blood loss: None  Drains: None  Specimens: * No specimens in log *  Findings:   2 essential oil bottles.  Complications: None.  Implants: None.

## 2018-07-19 NOTE — ANESTHESIA PREPROCEDURE EVALUATION
Anesthesia Evaluation     . Pt has had prior anesthetic.            ROS/MED HX    ENT/Pulmonary:       Neurologic:  - neg neurologic ROS     Cardiovascular:     (+) ----. : . . fainting (syncope). :. .       METS/Exercise Tolerance:  4 - Raking leaves, gardening   Hematologic:         Musculoskeletal:  - neg musculoskeletal ROS       GI/Hepatic:  - neg GI/hepatic ROS       Renal/Genitourinary:  - ROS Renal section negative       Endo:     (+) Obesity, .      Psychiatric:     (+) psychiatric history other (comment) (Borderline)      Infectious Disease:  - neg infectious disease ROS       Malignancy:      - no malignancy   Other:    (+) No chance of pregnancy C-spine cleared: N/A, no H/O Chronic Pain,no other significant disability                    Physical Exam  Normal systems: pulmonary and dental    Airway   Mallampati: III    Dental     Cardiovascular   Rhythm and rate: regular and normal      Pulmonary                     Anesthesia Plan      History & Physical Review  History and physical reviewed and following examination; no interval change.    ASA Status:  3 emergent.    NPO Status:  > 4 hours    Plan for General, RSI and ETT with Intravenous and Propofol induction. Maintenance will be Balanced.    PONV prophylaxis:  Ondansetron (or other 5HT-3)  Additional equipment: Videolaryngoscope      Postoperative Care  Postoperative pain management:  IV analgesics.      Consents  Anesthetic plan, risks, benefits and alternatives discussed with:  Patient..                          .

## 2018-07-19 NOTE — ED PROVIDER NOTES
"  History     Chief Complaint   Patient presents with     Foreign Body in Rectum     HPI  Nevin Alvarado is a 27 year old female with a complex past history significant for chronic pain, borderline personality disorder, self-injurious behavior including foreign body ingestions, placing objects in rectum requiring surgical extraction, and intentional overdoses who presents for evaluation of foreign body in the rectum    Tonight, patient reports she was \"trying to pleasure myself\" when she inserted two glass bottles of essential oils into her rectum. She attempted to remove them herself, but states they are out of reach with her own fingers. She also complains of mild abdominal pain. She reports she does not currently have a therapist, but is scheduled to see a new therapist on 07/23/18. She also reports that she has plans to move into supportive living on 08/31/18. She is prescribed multiple medications for her mental health which she states she has been taking, and has not missed any doses recently. Of note, per chart review the patient has been seen and evaluated in multiple emergency departments 22 times in the past 1.5 months for abdominal pain, overdoses, and swallowed foreign bodies.     I have reviewed the Medications, Allergies, Past Medical and Surgical History, and Social History in the [employer] system.  Past Medical History:   Diagnosis Date     ADD (attention deficit disorder)      Anorexia nervosa with bulimia     history of; on Topamax     Anxiety      Borderline personality disorder      Depression      Depressive disorder      H/O self-harm      Lives in independent group home     due to debilitating mental illness     Migraine without aura     no known triggers; on Topamax bid and Imitrex PRN     Morbid obesity (H)      PTSD (post-traumatic stress disorder)      Rectal foreign body - Recurrent issue, self placed      Swallowed foreign body - Recurrent issue, self placed        Past Surgical " History:   Procedure Laterality Date     ESOPHAGOSCOPY, GASTROSCOPY, DUODENOSCOPY (EGD), COMBINED N/A 3/9/2017    Procedure: COMBINED ESOPHAGOSCOPY, GASTROSCOPY, DUODENOSCOPY (EGD), REMOVE FOREIGN BODY;  Surgeon: Avis Guzmán MD;  Location: [facility]     ESOPHAGOSCOPY, GASTROSCOPY, DUODENOSCOPY (EGD), COMBINED N/A 4/20/2017    Procedure: COMBINED ESOPHAGOSCOPY, GASTROSCOPY, DUODENOSCOPY (EGD), REMOVE FOREIGN BODY;  EGD removal Foregin body;  Surgeon: Lokesh Paula MD;  Location: [facility]     ESOPHAGOSCOPY, GASTROSCOPY, DUODENOSCOPY (EGD), COMBINED N/A 6/12/2017    Procedure: COMBINED ESOPHAGOSCOPY, GASTROSCOPY, DUODENOSCOPY (EGD);  COMBINED ESOPHAGOSCOPY, GASTROSCOPY, DUODENOSCOPY (EGD) [[id number]]attempted removal of foreign body;  Surgeon: Pamela Perez MD;  Location: [facility]     ESOPHAGOSCOPY, GASTROSCOPY, DUODENOSCOPY (EGD), COMBINED N/A 6/9/2017    Procedure: COMBINED ESOPHAGOSCOPY, GASTROSCOPY, DUODENOSCOPY (EGD), REMOVE FOREIGN BODY;  Esophagoscopy, Gastroscopy, Duodenoscopy, Removal of Foreign Body;  Surgeon: Dejon Marsh MD;  Location: [facility]     ESOPHAGOSCOPY, GASTROSCOPY, DUODENOSCOPY (EGD), COMBINED N/A 1/6/2018    Procedure: COMBINED ESOPHAGOSCOPY, GASTROSCOPY, DUODENOSCOPY (EGD), REMOVE FOREIGN BODY;  COMBINED ESOPHAGOSCOPY, GASTROSCOPY, DUODENOSCOPY (EGD) [by pascal net and snare with profol sedation;  Surgeon: Feliciano Emmanuel MD;  Location:  GI     ESOPHAGOSCOPY, GASTROSCOPY, DUODENOSCOPY (EGD), COMBINED N/A 3/19/2018    Procedure: COMBINED ESOPHAGOSCOPY, GASTROSCOPY, DUODENOSCOPY (EGD), REMOVE FOREIGN BODY;   Esophagodscopy, Gastroscopy, Duodenoscopy,Foreign Body Removal;  Surgeon: Brice Guzmán MD;  Location: [facility]     ESOPHAGOSCOPY, GASTROSCOPY, DUODENOSCOPY (EGD), COMBINED N/A 4/16/2018    Procedure: COMBINED ESOPHAGOSCOPY, GASTROSCOPY, DUODENOSCOPY (EGD), REMOVE FOREIGN BODY;  Esophagogastroduodenoscopy  Foreign Body Removal X 2;  Surgeon: Jose Maria  Royer Zhou MD;  Location: [facility]     ESOPHAGOSCOPY, GASTROSCOPY, DUODENOSCOPY (EGD), COMBINED N/A 6/1/2018    Procedure: COMBINED ESOPHAGOSCOPY, GASTROSCOPY, DUODENOSCOPY (EGD), REMOVE FOREIGN BODY;  COMBINED ESOPHAGOSCOPY, GASTROSCOPY, DUODENOSCOPY with removal of foreign body, propofol sedation by anesthesia;  Surgeon: Brice Martinez MD;  Location: [facility]     EXAM UNDER ANESTHESIA ANUS N/A 1/10/2017    Procedure: EXAM UNDER ANESTHESIA ANUS;  Surgeon: Annmarie Haynes MD;  Location: [facility]     HC REMOVE FECAL IMPACTION OR FB W ANESTHESIA N/A 12/18/2016    Procedure: REMOVE FECAL IMPACTION/FOREIGN BODY UNDER ANESTHESIA;  Surgeon: Soham Cano MD;  Location: [facility] OR     KNEE SURGERY - removed a small tissue mass from knee Right      LAPAROSCOPIC ABLATION ENDOMETRIOSIS       LAPAROTOMY EXPLORATORY N/A 1/10/2017    Procedure: LAPAROTOMY EXPLORATORY;  Surgeon: Annmarie Haynes MD;  Location: [facility] OR     lymph nodes removed from neck; benign  age 7     MAMMOPLASTY REDUCTION Bilateral      RELEASE CARPAL TUNNEL Bilateral      SIGMOIDOSCOPY FLEXIBLE N/A 1/10/2017    Procedure: SIGMOIDOSCOPY FLEXIBLE;  Surgeon: Annmarie Haynes MD;  Location: [facility]     SIGMOIDOSCOPY FLEXIBLE N/A 5/8/2018    Procedure: SIGMOIDOSCOPY FLEXIBLE;  flex sig with foreign body removal using snare and rattooth forcep;  Surgeon: Soham Cano MD;  Location: [facility]       Family History   Problem Relation Age of Onset     Type 2 Diabetes Maternal Grandmother      Type 2 Diabetes Paternal Grandmother      Breast Cancer Paternal Grandmother      Cerebrovascular Disease Father 53     Myocardial Infarction No family hx of      Coronary Artery Disease Early Onset No family hx of      Ovarian Cancer No family hx of      Colon Cancer No family hx of        Social History   Substance Use Topics     Smoking status: Never Smoker     Smokeless tobacco: Never Used     Alcohol use No       No current facility-administered  "medications for this encounter.      Current Outpatient Prescriptions   Medication     Cholecalciferol (VITAMIN D3 PO)     CLONIDINE HCL PO     Desvenlafaxine Succinate (PRISTIQ PO)     levonorgestrel (MIRENA) 20 MCG/24HR IUD     lurasidone (LATUDA) 20 MG TABS tablet     MELATONIN PO     ondansetron (ZOFRAN-ODT) 4 MG ODT tab        Allergies   Allergen Reactions     Penicillins Anaphylaxis     Blood-Group Specific Substance Other (See Comments)     Patient has an anti-Cw and non-specific antibodies. Blood product orders may be delayed. Draw one red top and two purple top tubes for all type/screen/crossmatch orders.     Hydrocodone Nausea and Vomiting     vomiting for days     Influenza Vaccines Other (See Comments)     Oseltamivir Hives     med stopped, PN: med stopped     Vicodin [Hydrocodone-Acetaminophen] Nausea and Vomiting     Cephalosporins Rash     Lamotrigine Rash     Possibly associated with Lamictal.      Latex Rash       Review of Systems  ROS: 14 point ROS neg other than the symptoms noted above in the HPI.    Physical Exam   BP: 157/88  Pulse: 88  Temp: 98.5  F (36.9  C)  Height: 157.5 cm (5' 2\")  Weight: 112.9 kg (249 lb)  SpO2: 98 %      Physical Exam   Constitutional: She is oriented to person, place, and time. She appears well-developed and well-nourished. No distress.   HENT:   Head: Normocephalic and atraumatic.   Eyes: No scleral icterus.   Neck: Normal range of motion. Neck supple.   Cardiovascular: Normal rate.    Pulmonary/Chest: Effort normal. No respiratory distress. She has no wheezes.   Abdominal: There is generalized tenderness.   Neurological: She is alert and oriented to person, place, and time.   Skin: Skin is warm and dry. No rash noted. She is not diaphoretic. No erythema. No pallor.       ED Course     ED Course     Procedures       11:34 PM  The patient was seen and examined by Dr. Velasquez in Room 17.          Critical Care time:  none             Labs Ordered and Resulted from Time " of ED Arrival Up to the Time of Departure from the ED - No data to display         Assessments & Plan (with Medical Decision Making)   This is a 27-year-old female patient coming into emergency room with complaints of foreign body in the rectum.  She was attempting to sexually stimulate herself by utilizing glass bottles which became enlarged into her rectum.  Patient has a long history of foreign body in the GI system.  She also has multiple issues with overdose of multiple medications.  At this time colorectal evaluated the patient and will take the patient to the operating room for further care management of rectal foreign body.    I have reviewed the nursing notes.    I have reviewed the findings, diagnosis, plan and need for follow up with the patient.    New Prescriptions    No medications on file       Final diagnoses:   Rectal foreign body, initial encounter   I, Fernanda Rascon, am serving as a trained medical scribe to document services personally performed by Jeffy Velasquez MD, based on the provider's statements to me.   IJeffy MD, was physically present and have reviewed and verified the accuracy of this note documented by Fernanda Rascon.      7/18/2018   [facility], [place], EMERGENCY DEPARTMENT     Jeffy Velasquez MD  07/19/18 [id number]

## 2018-07-19 NOTE — PLAN OF CARE
"Problem: Surgery Nonspecified (Adult)  Goal: Signs and Symptoms of Listed Potential Problems Will be Absent, Minimized or Managed (Surgery Nonspecified)  Signs and symptoms of listed potential problems will be absent, minimized or managed by discharge/transition of care (reference Surgery Nonspecified (Adult) CPG).   Outcome: Improving  Respiratory rate occasionally  Dropped to 11, IPI range was 7-10,other vss for pt.No rectal drainage noted  this shift.C/O nausea this am,compazine given with pt stating it helped slightly.When encouraged to eat a minimal amount,pt ate a large breakfast.C/O increased pain requesting dilaudid IV for pain med,instructed pt that due to her respiratory rate it was best to give oxycodone.After receiving oxy pt rested well for about 2 hours.When ordering lunch,pt again requested dilaudid and then stated \"I've overdosed on pain meds in the past so I'm sure they won't give me any thihg for home.\" Teaching done re:need to try oxycodone first,pt was very resistant to this.Dilaudid was given x1 for c/o pain with stated decrease in pain.Psych saw this am.Plan is for pt to discharge this afternoon to apt.Voiding without difficulty.Passing flatus,no stool.We will set up transport via cab.      "

## 2018-07-19 NOTE — DISCHARGE SUMMARY
[facility]  Discharge Summary  Colon and Rectal Surgery     Nevin Alvarado MRN# [id number]   YOB: 1990 Age: 27 year old     Date of Admission:  7/18/2018  Date of Discharge::  7/19/2018  Admitting Physician:  Annmarie Haynes MD  Discharge Physician:  Annmarie Haynes MD  Primary Care Physician:        Latonya Knight          Admission Diagnoses:   Rectal foreign body, initial encounter [T18.5XXA]          Discharge Diagnosis:   Rectal foreign body             Procedures:   Exam under anesthesia, Removal of rectal foreign body            Consultations:   PSYCHIATRY IP CONSULT         Imaging Studies:     Results for orders placed or performed during the hospital encounter of 07/18/18   XR Abdomen 1 View    Narrative    XR ABDOMEN 1 VW  7/18/2018 10:07 PM      HISTORY: foreign body in rectum;     COMPARISON: 7/16/2018    FINDINGS: There are 2 radiopaque foreign bodies in the rectum.  Intrauterine device. Pelvic phleboliths. No acute fracture. Mild  colonic stool burden. No pneumatosis intestinalis or definite  pneumoperitoneum.      Impression    IMPRESSION: 2 foreign bodies project over the intraluminal aspect of  the rectum. No pneumatosis intestinalis. No definitive evidence of  pneumoperitoneum, although sensitivity is decreased from supine  technique.    [Result: Rectal foreign bodies]    Finding was identified on 7/18/2018 10:47 PM.     Dr. Velasquez was contacted by Dr. Pena at 7/18/2018 10:54 PM and  verbalized understanding of the urgent finding.     I have personally reviewed the examination and initial interpretation  and I agree with the findings.    TAMIKO VAZQUEZ MD              Medications Prior to Admission:     Prescriptions Prior to Admission   Medication Sig Dispense Refill Last Dose     Cholecalciferol (VITAMIN D3 PO) Take 2,000 Units by mouth every morning    7/18/2018 at Unknown time     Desvenlafaxine Succinate (PRISTIQ PO) Take  100 mg by mouth every morning    7/18/2018 at Unknown time     levonorgestrel (MIRENA) 20 MCG/24HR IUD 1 each (20 mcg) by Intrauterine route once for 1 dose   7/19/2018 at Unknown time     lurasidone (LATUDA) 20 MG TABS tablet Take 2 tablets (40 mg) by mouth every evening 12 tablet 0 7/18/2018 at Unknown time     ondansetron (ZOFRAN-ODT) 4 MG ODT tab Take 4 mg by mouth every 6 hours as needed   Past Week at Unknown time     CLONIDINE HCL PO Take 0.1 mg by mouth 2 times daily   7/6/2018 at Unknown time     MELATONIN PO Take 3 mg by mouth nightly as needed (sleep)   Unknown at Unknown time              Discharge Medications:     Current Discharge Medication List      CONTINUE these medications which have NOT CHANGED    Details   Cholecalciferol (VITAMIN D3 PO) Take 2,000 Units by mouth every morning       Desvenlafaxine Succinate (PRISTIQ PO) Take 100 mg by mouth every morning       levonorgestrel (MIRENA) 20 MCG/24HR IUD 1 each (20 mcg) by Intrauterine route once for 1 dose      lurasidone (LATUDA) 20 MG TABS tablet Take 2 tablets (40 mg) by mouth every evening  Qty: 12 tablet, Refills: 0    Associated Diagnoses: Borderline personality disorder; Severe episode of recurrent major depressive disorder, without psychotic features (H)      ondansetron (ZOFRAN-ODT) 4 MG ODT tab Take 4 mg by mouth every 6 hours as needed      CLONIDINE HCL PO Take 0.1 mg by mouth 2 times daily      MELATONIN PO Take 3 mg by mouth nightly as needed (sleep)                      Brief History of Illness:     27 year old female with significant psych hx presented with self-inflicted two glass foreign bodies in rectum.         Hospital Course:     27 year old female presented with self inflicted two glass foreign bodies in rectum.  Due to increasing abdominal pain and precarious location and material of foreign body she was emergently take to the OR for exam under anesthesia and removal of foreign body.  She tolerated the procedure well except  "complain of rectal pain, which was managed with pain medications. She was tolerating the regular diet without any nausea and vomiting. She was voiding spontaneously after the surgery. She was ambulating independently.   Psychiatry was consulted prior to discharge to make sure she was okay to be discharged home. Psych okay'd and agreed that a more supervised environment would likely be beneficial for her and consider restricting her care to [facility] because of previous psychiatry cares at that location.   She is cleared from colorectal standpoint and will not need any future follow ups.          Day of Discharge Physical Exam:   Blood pressure 143/90, pulse 88, temperature 96.9  F (36.1  C), temperature source Oral, resp. rate 15, height 5' 2\", weight 249 lb, SpO2 94 %, not currently breastfeeding.    Gen: AAOx3, NAD  Pulm: Non-labored breathing  Abd: Soft, appropriately tender, no guarding  Ext:  Warm and well-perfused         Final Pathology Result:   No pathology submitted         Discharge Instructions and Follow-Up:     No discharge procedures on file.         Home Health Care:   Arranged           Discharge Disposition:   Discharged to home      Condition at discharge: Stable    Duarte Dotson MD  General Surgery PGY-1  [phone]    Pt was seen and discussed with Dr. Thornton on 07/19/2018      Staff Addendum:  Agree with the discharge summary as documented. I was personally involved with the discharge planning and hospital decision-making for this patient.  Annmarie Haynes MD  Colon and Rectal Surgery Staff  [facility]      "

## 2018-07-19 NOTE — ED TRIAGE NOTES
Pt states that tonight she inserted two essential oil bottles into her own rectum.  Pt c/o bottles being stuck, unable to get out.

## 2018-07-20 ENCOUNTER — PATIENT OUTREACH (OUTPATIENT)
Dept: CARE COORDINATION | Facility: CLINIC | Age: 28
End: 2018-07-20

## 2018-07-20 NOTE — PROGRESS NOTES
Clinic Care Coordination Contact      Situation: Patient chart reviewed by care coordinator.      Background: MH pt is managed through [facility], has MH CM, Psychiatry, therapy in place. Pt only utilizes [place] ED. PCP with [facility]      Assessment: Chronic/Persistantly MI      Plan/Recommendations: Not open to [facility] services. Not a [facility] pt.       LEATHA Mayers  Care Coordinator  [phone]  [email or web address]

## 2018-07-21 NOTE — OP NOTE
SURGEON: Annmarie Haynes MD     ASSISTANT: Sarah Baptiste MD Colorectal Surgery Fellow.    PREOPERATIVE DIAGNOSIS: Rectal foreign body ×2 with recurrent episodes of this as well as ingestion of foreign bodies.    POSTOPERATIVE DIAGNOSIS: Rectal foreign body ×2 with recurrent episodes of this as well as ingestion of foreign bodies.     PROCEDURE: Examination under anesthesia with removal of foreign bodies ×2    INDICATIONS: Nevin Alvarado is a 27 year old female who has been treated multiple times for rectal foreign body including multiple laparotomies examination under anesthesia and endoscopic removal.  In addition, she has also multiple times ingested foreign bodies including Alberto pins and hard objects of various sorts including the handle of sunglasses primarily requiring endoscopic removal and in some cases passage of the foreign body.  She presents this evening or early morning with rectal foreign body ×2 which were described as essential oil bottles and increasing abdominal pain.  The last time I had to remove a rectal foreign body from this patient approximately a little over a year ago and required laparotomy due to the size of the bilateral and inability to get around colonoscopically. The risks and benefits of surgery were thoroughly discussed with the patient and she agreed to proceed.     DESCRIPTION OF PROCEDURE: The patient was brought to the operating room and general endotracheal anesthesia was induced.  She was placed in modified lithotomy on the operating room table using yellowfin stirrups. We began the procedure with a timeout and performed an examination under anesthesia.  In the emergency department, the patient was not able to tolerate the exam.  With full anesthesia the first central bottle could be felt in the upper mid rectum and with some care was extracted manually.  Using a bimanual technique placing pressure over the suprapubic area and a manual technique eventually the  second bottle could be felt and with some care we were able to also extract this manually. We performed anoscopy which demonstrated no significant trauma or other anorectal abnormality.     At the end the case, all instruments and sponge counts were correct x2. The patient was emerged from anesthesia and taken to postoperative anesthesia care unit in good condition.     The plan was to admit the patient under observation to our service and to obtain a psychiatry consult and full assessment.    SPECIMEN: Rectal foreign body ×2.     ESTIMATED BLOOD LOSS: Minimal.     DRAINS: None.     URINE OUTPUT: Not measured.     JUDD LEAL MD   Colon and Rectal Surgery Staff  [facility]

## 2018-07-22 ENCOUNTER — APPOINTMENT (OUTPATIENT)
Dept: GENERAL RADIOLOGY | Facility: CLINIC | Age: 28
End: 2018-07-22
Attending: EMERGENCY MEDICINE
Payer: MEDICARE

## 2018-07-22 ENCOUNTER — HOSPITAL ENCOUNTER (EMERGENCY)
Facility: CLINIC | Age: 28
Discharge: HOME | End: 2018-07-23
Attending: EMERGENCY MEDICINE | Admitting: EMERGENCY MEDICINE
Payer: MEDICARE

## 2018-07-22 DIAGNOSIS — T18.5XXA FOREIGN BODY IN ANUS AND RECTUM, INITIAL ENCOUNTER: ICD-10-CM

## 2018-07-22 LAB
ANION GAP SERPL CALCULATED.3IONS-SCNC: 8 MMOL/L (ref 3–14)
BASOPHILS # BLD AUTO: 0 10E9/L (ref 0–0.2)
BASOPHILS NFR BLD AUTO: 0.1 %
BUN SERPL-MCNC: 9 MG/DL (ref 7–30)
CALCIUM SERPL-MCNC: 8.8 MG/DL (ref 8.5–10.1)
CHLORIDE SERPL-SCNC: 106 MMOL/L (ref 94–109)
CO2 SERPL-SCNC: 26 MMOL/L (ref 20–32)
CREAT SERPL-MCNC: 0.55 MG/DL (ref 0.52–1.04)
DIFFERENTIAL METHOD BLD: ABNORMAL
EOSINOPHIL # BLD AUTO: 0.1 10E9/L (ref 0–0.7)
EOSINOPHIL NFR BLD AUTO: 1.3 %
ERYTHROCYTE [DISTWIDTH] IN BLOOD BY AUTOMATED COUNT: 13.3 % (ref 10–15)
GFR SERPL CREATININE-BSD FRML MDRD: >90 ML/MIN/1.7M2
GLUCOSE SERPL-MCNC: 94 MG/DL (ref 70–99)
HCT VFR BLD AUTO: 33.4 % (ref 35–47)
HGB BLD-MCNC: 10.9 G/DL (ref 11.7–15.7)
IMM GRANULOCYTES # BLD: 0 10E9/L (ref 0–0.4)
IMM GRANULOCYTES NFR BLD: 0.3 %
LYMPHOCYTES # BLD AUTO: 2 10E9/L (ref 0.8–5.3)
LYMPHOCYTES NFR BLD AUTO: 19.4 %
MCH RBC QN AUTO: 28.7 PG (ref 26.5–33)
MCHC RBC AUTO-ENTMCNC: 32.6 G/DL (ref 31.5–36.5)
MCV RBC AUTO: 88 FL (ref 78–100)
MONOCYTES # BLD AUTO: 0.5 10E9/L (ref 0–1.3)
MONOCYTES NFR BLD AUTO: 5.2 %
NEUTROPHILS # BLD AUTO: 7.5 10E9/L (ref 1.6–8.3)
NEUTROPHILS NFR BLD AUTO: 73.7 %
NRBC # BLD AUTO: 0 10*3/UL
NRBC BLD AUTO-RTO: 0 /100
PLATELET # BLD AUTO: 222 10E9/L (ref 150–450)
POTASSIUM SERPL-SCNC: 3.4 MMOL/L (ref 3.4–5.3)
RBC # BLD AUTO: 3.8 10E12/L (ref 3.8–5.2)
SODIUM SERPL-SCNC: 141 MMOL/L (ref 133–144)
WBC # BLD AUTO: 10.2 10E9/L (ref 4–11)

## 2018-07-22 PROCEDURE — 96375 TX/PRO/DX INJ NEW DRUG ADDON: CPT | Performed by: EMERGENCY MEDICINE

## 2018-07-22 PROCEDURE — 25000128 H RX IP 250 OP 636: Performed by: EMERGENCY MEDICINE

## 2018-07-22 PROCEDURE — 96374 THER/PROPH/DIAG INJ IV PUSH: CPT | Performed by: EMERGENCY MEDICINE

## 2018-07-22 PROCEDURE — 74019 RADEX ABDOMEN 2 VIEWS: CPT

## 2018-07-22 PROCEDURE — 80048 BASIC METABOLIC PNL TOTAL CA: CPT | Performed by: EMERGENCY MEDICINE

## 2018-07-22 PROCEDURE — 85025 COMPLETE CBC W/AUTO DIFF WBC: CPT | Performed by: EMERGENCY MEDICINE

## 2018-07-22 PROCEDURE — 99283 EMERGENCY DEPT VISIT LOW MDM: CPT | Mod: Z6 | Performed by: EMERGENCY MEDICINE

## 2018-07-22 PROCEDURE — 99285 EMERGENCY DEPT VISIT HI MDM: CPT | Mod: 25 | Performed by: EMERGENCY MEDICINE

## 2018-07-22 RX ORDER — LIDOCAINE HYDROCHLORIDE 10 MG/ML
INJECTION, SOLUTION EPIDURAL; INFILTRATION; INTRACAUDAL; PERINEURAL
Status: DISCONTINUED
Start: 2018-07-22 | End: 2018-07-22 | Stop reason: HOSPADM

## 2018-07-22 RX ORDER — PROPOFOL 10 MG/ML
60 INJECTION, EMULSION INTRAVENOUS ONCE
Status: COMPLETED | OUTPATIENT
Start: 2018-07-22 | End: 2018-07-22

## 2018-07-22 RX ADMIN — PROPOFOL 40 MG: 10 INJECTION, EMULSION INTRAVENOUS at 23:55

## 2018-07-22 RX ADMIN — PROPOFOL 60 MG: 10 INJECTION, EMULSION INTRAVENOUS at 23:53

## 2018-07-22 ASSESSMENT — ENCOUNTER SYMPTOMS
CONSTIPATION: 1
NAUSEA: 0
VOMITING: 0
ABDOMINAL PAIN: 1

## 2018-07-22 NOTE — ED AVS SNAPSHOT
[facility], Emergency Department    [address] [facility] [address]    Phone:  [phone]                                       Nevin Alvarado   MRN: [id number]    Department:  [facility], Emergency Department   Date of Visit:  7/22/2018           Patient Information     Date Of Birth          1990        Your diagnoses for this visit were:     Foreign body in anus and rectum, initial encounter        You were seen by Emily Cao MD.        Discharge Instructions         Please make an appointment to follow up with [facility] (phone: [phone]) in 2-4 days even if entirely better.  Stop putting things in your anus.         Rectal Foreign Body, Removed (Adult)  An object in the rectum is called a foreign body. Objects that are easy to put into the rectum can be very hard to get out again. Trying to remove them can lead to swelling and spasms of the rectal muscles. This can make getting the object out even harder.  An object stuck in the rectum can be very painful. It can cause bleeding. It may also puncture the wall of the rectum. This can be very serious. If the object is in the rectum for a time, infection can develop.  X-rays or other tests may be done to get a view of the object and where it is. The goal is to remove the object from the rectum without causing further damage. This can often be done safely in the emergency department. Medicine may be given to relax you and prevent pain during removal. After the object is removed, the rectum is examined for signs of injury or infection.    Home care  Medicine may be prescribed to help relieve pain and swelling. Take all medicine as directed. Antibiotics may be used to help treat or prevent infection. If these are prescribed, take them as directed until they are gone.  The following are general care guidelines:    If you were given relaxing medicine for the removal, do not drive or operate heavy equipment for 24  hours.    Don't put anything into your rectum for at least a week. This allows it to heal.    If passing stool is painful, use a laxative or stool softener for a few days. Take these by mouth, instead of using a suppository.    If using vibrators or dildos for sex play, choose one made for insertion into the rectum. These have safety features that keep them from getting stuck.  Follow-up care  Follow up with your healthcare provider, or as advised. If you have damage to the rectum, you may be referred to a specialist.  Call 911  Call 911 if any of these occur:    Heavy bleeding    Swollen, tense, or very painful abdomen    Fainting or loss of consciousness    Trouble breathing    Rapid heart rate  When to seek medical advice  Call your healthcare provider right away if any of these occur:    Fever of 100.4 F (38 C) or higher, or as directed by your healthcare provider    Bleeding from the rectum    Rectal pain that gets worse    Nausea or vomiting  Date Last Reviewed: 6/1/2016 2000-2017 [employer]. [address] [place] [address]. All rights reserved. This information is not intended as a substitute for professional medical care. Always follow your healthcare professional's instructions.          24 Hour Appointment Hotline       To make an appointment at any [facility], call [phone] ([phone]). If you don't have a family doctor or clinic, we will help you find one. [facility] are conveniently located to serve the needs of you and your family.             Review of your medicines      Our records show that you are taking the medicines listed below. If these are incorrect, please call your family doctor or clinic.        Dose / Directions Last dose taken    acetaminophen 325 MG tablet   Commonly known as:  TYLENOL   Dose:  650 mg   Quantity:  10 tablet        Take 2 tablets (650 mg) by mouth every 8 hours   Refills:  0        CLONIDINE HCL PO   Dose:  0.1 mg         Take 0.1 mg by mouth 2 times daily   Refills:  0        levonorgestrel 20 MCG/24HR IUD   Commonly known as:  MIRENA   Dose:  1 each        1 each (20 mcg) by Intrauterine route once for 1 dose   Refills:  0        lurasidone 20 MG Tabs tablet   Commonly known as:  LATUDA   Dose:  40 mg   Quantity:  12 tablet        Take 2 tablets (40 mg) by mouth every evening   Refills:  0        MELATONIN PO   Dose:  3 mg        Take 3 mg by mouth nightly as needed (sleep)   Refills:  0        oxyCODONE IR 5 MG tablet   Commonly known as:  ROXICODONE   Dose:  5 mg   Quantity:  5 tablet        Take 1 tablet (5 mg) by mouth every 3 hours as needed for moderate to severe pain or other (pain control or improvement in physical function. Hold dose for analgesic side effects.)   Refills:  0        PRISTIQ PO   Dose:  100 mg        Take 100 mg by mouth every morning   Refills:  0        VITAMIN D3 PO   Dose:  2000 Units        Take 2,000 Units by mouth every morning   Refills:  0                Procedures and tests performed during your visit     Basic metabolic panel    CBC with platelets differential    KUB XR      Orders Needing Specimen Collection     None      Pending Results     No orders found for last 3 day(s).            Pending Culture Results     No orders found for last 3 day(s).            Pending Results Instructions     If you had any lab results that were not finalized at the time of your Discharge, you can call the ED Lab Result RN at [phone]. You will be contacted by this team for any positive Lab results or changes in treatment. The nurses are available 7 days a week from 10A to 6:30P.  You can leave a message 24 hours per day and they will return your call.        Thank you for choosing [facility]       Thank you for choosing [facility] for your care. Our goal is always to provide you with excellent care. Hearing back from our patients is one way we can continue to improve our services. Please take a few minutes to  complete the written survey that you may receive in the mail after you visit with us. Thank you!        [facility] Information     [employer] gives you secure access to your electronic health record. If you see a primary care provider, you can also send messages to your care team and make appointments. If you have questions, please call your primary care clinic.  If you do not have a primary care provider, please call [phone] and they will assist you.        Care EveryWhere ID     This is your Care EveryWhere ID. This could be used by other organizations to access your [facility] medical records  [id number]        Equal Access to Services     [facility] : Gabriel Collado, erick singh, taylor tenorio, mic persaud. So [facility] [phone].    ATENCIÓN: Si habla español, tiene a singh disposición servicios gratuitos de asistencia lingüística. Llame al [phone].    We comply with applicable federal civil rights laws and [place] laws. We do not discriminate on the basis of race, color, national origin, age, disability, sex, sexual orientation, or gender identity.            After Visit Summary       This is your record. Keep this with you and show to your community pharmacist(s) and doctor(s) at your next visit.

## 2018-07-22 NOTE — ED AVS SNAPSHOT
[facility], [place], Emergency Department    [address] [place] [address]    Phone:  [phone]                                       Nevin Alvarado   MRN: [id number]    Department:  [facility], Emergency Department   Date of Visit:  7/22/2018           After Visit Summary Signature Page     I have received my discharge instructions, and my questions have been answered. I have discussed any challenges I see with this plan with the nurse or doctor.    ..........................................................................................................................................  Patient/Patient Representative Signature      ..........................................................................................................................................  Patient Representative Print Name and Relationship to Patient    ..................................................               ................................................  Date                                            Time    ..........................................................................................................................................  Reviewed by Signature/Title    ...................................................              ..............................................  Date                                                            Time

## 2018-07-23 VITALS
BODY MASS INDEX: 45.82 KG/M2 | HEIGHT: 62 IN | OXYGEN SATURATION: 96 % | SYSTOLIC BLOOD PRESSURE: 112 MMHG | HEART RATE: 81 BPM | WEIGHT: 249 LBS | DIASTOLIC BLOOD PRESSURE: 73 MMHG | TEMPERATURE: 98.6 F | RESPIRATION RATE: 16 BRPM

## 2018-07-23 PROCEDURE — 96375 TX/PRO/DX INJ NEW DRUG ADDON: CPT | Performed by: EMERGENCY MEDICINE

## 2018-07-23 PROCEDURE — 25000128 H RX IP 250 OP 636: Performed by: EMERGENCY MEDICINE

## 2018-07-23 RX ORDER — PROPOFOL 10 MG/ML
40 INJECTION, EMULSION INTRAVENOUS ONCE
Status: COMPLETED | OUTPATIENT
Start: 2018-07-22 | End: 2018-07-22

## 2018-07-23 RX ORDER — KETOROLAC TROMETHAMINE 30 MG/ML
30 INJECTION, SOLUTION INTRAMUSCULAR; INTRAVENOUS ONCE
Status: COMPLETED | OUTPATIENT
Start: 2018-07-23 | End: 2018-07-23

## 2018-07-23 RX ADMIN — KETOROLAC TROMETHAMINE 30 MG: 30 INJECTION, SOLUTION INTRAMUSCULAR at 00:49

## 2018-07-23 NOTE — DISCHARGE INSTRUCTIONS
Please make an appointment to follow up with [facility] (phone: [phone]) in 2-4 days even if entirely better.  Stop putting things in your anus.         Rectal Foreign Body, Removed (Adult)  An object in the rectum is called a foreign body. Objects that are easy to put into the rectum can be very hard to get out again. Trying to remove them can lead to swelling and spasms of the rectal muscles. This can make getting the object out even harder.  An object stuck in the rectum can be very painful. It can cause bleeding. It may also puncture the wall of the rectum. This can be very serious. If the object is in the rectum for a time, infection can develop.  X-rays or other tests may be done to get a view of the object and where it is. The goal is to remove the object from the rectum without causing further damage. This can often be done safely in the emergency department. Medicine may be given to relax you and prevent pain during removal. After the object is removed, the rectum is examined for signs of injury or infection.    Home care  Medicine may be prescribed to help relieve pain and swelling. Take all medicine as directed. Antibiotics may be used to help treat or prevent infection. If these are prescribed, take them as directed until they are gone.  The following are general care guidelines:    If you were given relaxing medicine for the removal, do not drive or operate heavy equipment for 24 hours.    Don't put anything into your rectum for at least a week. This allows it to heal.    If passing stool is painful, use a laxative or stool softener for a few days. Take these by mouth, instead of using a suppository.    If using vibrators or dildos for sex play, choose one made for insertion into the rectum. These have safety features that keep them from getting stuck.  Follow-up care  Follow up with your healthcare provider, or as advised. If you have damage to the rectum, you may be referred to a  specialist.  Call 911  Call 911 if any of these occur:    Heavy bleeding    Swollen, tense, or very painful abdomen    Fainting or loss of consciousness    Trouble breathing    Rapid heart rate  When to seek medical advice  Call your healthcare provider right away if any of these occur:    Fever of 100.4 F (38 C) or higher, or as directed by your healthcare provider    Bleeding from the rectum    Rectal pain that gets worse    Nausea or vomiting  Date Last Reviewed: 6/1/2016 2000-2017 [employer]. [address] [place] [address]. All rights reserved. This information is not intended as a substitute for professional medical care. Always follow your healthcare professional's instructions.

## 2018-07-23 NOTE — ED TRIAGE NOTES
Pt presents to ED with c/o glass bottle stuck up rectum. Pt got bottle stuck in rectum about a week ago and pt went to surgery to have object removed. Pt states bottle is fully up in rectum. Describes some minor pain.

## 2018-07-23 NOTE — CONSULTS
Colon and Rectal Surgery Consultation         Nevin Alvarado    MRN# [id number]   YOB: 1990 Age: 27 year old   Date of Admission: 7/22/2018  Date of Consult: 7/22/2018          Assessment and Plan:      27F well known to service with history of PTSD and self harm, with multiple ED visits for ingestion by mouth or insertion of foreign bodies into rectum requiring various interventions(~23 visits over past 1.5mths). Recently s/p EUA with extraction of rectal foreign bodies on 7/19. Here with recurrent rectal foreign body.     Will attempt retrieval in ED under conscious sedation. If unable to do so, will plan to take to OR    Discussed with Dr Chávez       Addendum 7/23/18 at 12:03am  Successful removal of foreign body in ED under conscious sedation and perianal block with 10cc of 1% lidocaine (image to be uploaded into chart). Pt tolerated with no issues. Can be discharged from ED in am if continues to do well. Appreciate ED staff assistance.             Primary Care Physician:      Latonya Knight [phone]         Requesting Physician:      Dr Cao         Chief Complaint:      Rectal foreign body    History is obtained from the patient.         History of Present Illness:      Nevin Alvarado is a 27 year old female known to service with history of PTSD, borderline personality disorder and self harm. Seen in multiple EDs over past several years either after ingesting a foreign body or inserting into rectum. Frequency of visits increased over past 1.5 months with 23 ED evlauations reported. Last episode was on 7/19 at which time she required OR for EUA and retrieval of 2 essential oil bottles. Returns to ED today with complaints of lower abdominal pain and inability to have a BM after inserting another essential oil bottle via her rectum. Describes it as being larger in size with a rubber tip (tip more distal). Used a larger sized bottle to help avoid getting it stuck. No other  complaints. Wants to be completely sedated prior to any retrieval attempts              Past Medical History:        Past Medical History:   Diagnosis Date     ADD (attention deficit disorder)      Anorexia nervosa with bulimia     history of; on Topamax     Anxiety      Borderline personality disorder      Depression      Depressive disorder      H/O self-harm      Lives in independent group home     due to debilitating mental illness     Migraine without aura     no known triggers; on Topamax bid and Imitrex PRN     Morbid obesity (H)      PTSD (post-traumatic stress disorder)      Rectal foreign body - Recurrent issue, self placed      Swallowed foreign body - Recurrent issue, self placed              Past Surgical History:        Past Surgical History:   Procedure Laterality Date     ESOPHAGOSCOPY, GASTROSCOPY, DUODENOSCOPY (EGD), COMBINED N/A 3/9/2017    Procedure: COMBINED ESOPHAGOSCOPY, GASTROSCOPY, DUODENOSCOPY (EGD), REMOVE FOREIGN BODY;  Surgeon: Avis Guzmán MD;  Location: [facility]     ESOPHAGOSCOPY, GASTROSCOPY, DUODENOSCOPY (EGD), COMBINED N/A 4/20/2017    Procedure: COMBINED ESOPHAGOSCOPY, GASTROSCOPY, DUODENOSCOPY (EGD), REMOVE FOREIGN BODY;  EGD removal Foregin body;  Surgeon: Lokesh Paula MD;  Location: [facility] OR     ESOPHAGOSCOPY, GASTROSCOPY, DUODENOSCOPY (EGD), COMBINED N/A 6/12/2017    Procedure: COMBINED ESOPHAGOSCOPY, GASTROSCOPY, DUODENOSCOPY (EGD);  COMBINED ESOPHAGOSCOPY, GASTROSCOPY, DUODENOSCOPY (EGD) [[id number]]attempted removal of foreign body;  Surgeon: Pamela Perez MD;  Location: [facility] OR     ESOPHAGOSCOPY, GASTROSCOPY, DUODENOSCOPY (EGD), COMBINED N/A 6/9/2017    Procedure: COMBINED ESOPHAGOSCOPY, GASTROSCOPY, DUODENOSCOPY (EGD), REMOVE FOREIGN BODY;  Esophagoscopy, Gastroscopy, Duodenoscopy, Removal of Foreign Body;  Surgeon: Dejon Marsh MD;  Location: [facility] OR     ESOPHAGOSCOPY, GASTROSCOPY, DUODENOSCOPY (EGD), COMBINED N/A 1/6/2018    Procedure:  COMBINED ESOPHAGOSCOPY, GASTROSCOPY, DUODENOSCOPY (EGD), REMOVE FOREIGN BODY;  COMBINED ESOPHAGOSCOPY, GASTROSCOPY, DUODENOSCOPY (EGD) [by pascal net and snare with profol sedation;  Surgeon: Feliciano Emmanuel MD;  Location: [facility]     ESOPHAGOSCOPY, GASTROSCOPY, DUODENOSCOPY (EGD), COMBINED N/A 3/19/2018    Procedure: COMBINED ESOPHAGOSCOPY, GASTROSCOPY, DUODENOSCOPY (EGD), REMOVE FOREIGN BODY;   Esophagodscopy, Gastroscopy, Duodenoscopy,Foreign Body Removal;  Surgeon: Brice Guzmán MD;  Location: [facility]     ESOPHAGOSCOPY, GASTROSCOPY, DUODENOSCOPY (EGD), COMBINED N/A 4/16/2018    Procedure: COMBINED ESOPHAGOSCOPY, GASTROSCOPY, DUODENOSCOPY (EGD), REMOVE FOREIGN BODY;  Esophagogastroduodenoscopy  Foreign Body Removal X 2;  Surgeon: Royer Moise MD;  Location: [facility]     ESOPHAGOSCOPY, GASTROSCOPY, DUODENOSCOPY (EGD), COMBINED N/A 6/1/2018    Procedure: COMBINED ESOPHAGOSCOPY, GASTROSCOPY, DUODENOSCOPY (EGD), REMOVE FOREIGN BODY;  COMBINED ESOPHAGOSCOPY, GASTROSCOPY, DUODENOSCOPY with removal of foreign body, propofol sedation by anesthesia;  Surgeon: Brice Martinez MD;  Location: [facility]     EXAM UNDER ANESTHESIA ANUS N/A 1/10/2017    Procedure: EXAM UNDER ANESTHESIA ANUS;  Surgeon: Annmarie Haynes MD;  Location: [facility]     EXAM UNDER ANESTHESIA RECTUM N/A 7/19/2018    Procedure: EXAM UNDER ANESTHESIA RECTUM;  EXAM UNDER ANESTHESIA, REMOVAL OF RECTAL FOREIGN BODY;  Surgeon: Annmarie Haynes MD;  Location: [facility] OR     HC REMOVE FECAL IMPACTION OR FB W ANESTHESIA N/A 12/18/2016    Procedure: REMOVE FECAL IMPACTION/FOREIGN BODY UNDER ANESTHESIA;  Surgeon: Soham Cano MD;  Location: [facility] OR     KNEE SURGERY - removed a small tissue mass from knee Right      LAPAROSCOPIC ABLATION ENDOMETRIOSIS       LAPAROTOMY EXPLORATORY N/A 1/10/2017    Procedure: LAPAROTOMY EXPLORATORY;  Surgeon: Annmarie Haynes MD;  Location: [facility] OR     lymph nodes removed from neck; benign  age  7     MAMMOPLASTY REDUCTION Bilateral      RELEASE CARPAL TUNNEL Bilateral      SIGMOIDOSCOPY FLEXIBLE N/A 1/10/2017    Procedure: SIGMOIDOSCOPY FLEXIBLE;  Surgeon: Annmarie Haynes MD;  Location: [facility]     SIGMOIDOSCOPY FLEXIBLE N/A 5/8/2018    Procedure: SIGMOIDOSCOPY FLEXIBLE;  flex sig with foreign body removal using snare and rattooth forcep;  Surgeon: Soham Cano MD;  Location:  GI                 Home Medications:        Prior to Admission medications    Medication Sig Last Dose Taking? Auth Provider   CLONIDINE HCL PO Take 0.1 mg by mouth 2 times daily 7/22/2018 at Unknown time Yes Reported, Patient   Desvenlafaxine Succinate (PRISTIQ PO) Take 100 mg by mouth every morning  7/22/2018 at Unknown time Yes Reported, Patient   lurasidone (LATUDA) 20 MG TABS tablet Take 2 tablets (40 mg) by mouth every evening 7/22/2018 at Unknown time Yes Liza Gold APRN CNP   acetaminophen (TYLENOL) 325 MG tablet Take 2 tablets (650 mg) by mouth every 8 hours   Duarte Dotson MD   Cholecalciferol (VITAMIN D3 PO) Take 2,000 Units by mouth every morning    Reported, Patient   levonorgestrel (MIRENA) 20 MCG/24HR IUD 1 each (20 mcg) by Intrauterine route once for 1 dose   Sandra Ramos MD   MELATONIN PO Take 3 mg by mouth nightly as needed (sleep)   Unknown, Entered By History   oxyCODONE IR (ROXICODONE) 5 MG tablet Take 1 tablet (5 mg) by mouth every 3 hours as needed for moderate to severe pain or other (pain control or improvement in physical function. Hold dose for analgesic side effects.)   Duarte Dotson MD            Current Medications:                 Allergies:     Allergies   Allergen Reactions     Penicillins Anaphylaxis     Blood-Group Specific Substance Other (See Comments)     Patient has an anti-Cw and non-specific antibodies. Blood product orders may be delayed. Draw one red top and two purple top tubes for all type/screen/crossmatch orders.     Hydrocodone Nausea and Vomiting     " vomiting for days     Influenza Vaccines Other (See Comments)     Oseltamivir Hives     med stopped, PN: med stopped     Vicodin [Hydrocodone-Acetaminophen] Nausea and Vomiting     Cephalosporins Rash     Lamotrigine Rash     Possibly associated with Lamictal.      Latex Rash            Social History:        Social History   Substance Use Topics     Smoking status: Never Smoker     Smokeless tobacco: Never Used     Alcohol use No             Family History:        Family History   Problem Relation Age of Onset     Type 2 Diabetes Maternal Grandmother      Type 2 Diabetes Paternal Grandmother      Breast Cancer Paternal Grandmother      Cerebrovascular Disease Father 53     Myocardial Infarction No family hx of      Coronary Artery Disease Early Onset No family hx of      Ovarian Cancer No family hx of      Colon Cancer No family hx of              Review of Systems:      The 10 point Review of Systems is negative other than noted in the HPI.            Physical Exam:      Blood pressure (!) 135/94, pulse 81, temperature 98.3  F (36.8  C), temperature source Oral, resp. rate 18, height 1.575 m (5' 2\"), weight 112.9 kg (249 lb), SpO2 97 %, not currently breastfeeding.  Vitals:    18 2103   Weight: 112.9 kg (249 lb)     Vital Sign Ranges  Temperature Temp  Av.3  F (36.8  C)  Min: 98.3  F (36.8  C)  Max: 98.3  F (36.8  C)   Blood pressure Systolic (24hrs), Av , Min:135 , Max:135        Diastolic (24hrs), Av, Min:94, Max:94      Pulse Pulse  Av  Min: 81  Max: 81   Respirations Resp  Av  Min: 18  Max: 18   Pulse oximetry SpO2  Av %  Min: 97 %  Max: 97 %       No intake or output data in the 24 hours ending 18 2304    Constitutional: Awake, alert, cooperative, no apparent distress   Psych: Alert, oriented to person, place and time, blunt affect   Neuro: normal strength and sensation.   Eyes: Conjunctiva normal.   ENT: Moist mucous membranes, normal dentition.   GI: soft, " non-distended, mildly ttp in suprapubic region. Unable to palpate on LACY; limited though d/t pt's discomfort   Skin: No cyanosis   Musculoskeletal: No joint swelling, erythema or tenderness.          Data:      All new lab and imaging data was reviewed.   Recent Labs   Lab Test  07/22/18 2216 07/19/18 0836  07/19/18   0307  07/16/18   2214   03/19/18   2125  12/10/17   WBC  10.2   --   8.1  8.8   < >   --    < >   --    HGB  10.9*   --   11.2*  11.7   < >   --    < >   --    PLT  222  251  243  210   < >   --    < >   --    INR   --    --   1.09   --    --   1.03   --   1.12*    < > = values in this interval not displayed.      Recent Labs   Lab Test  07/22/18 2216 07/19/18 0836 07/19/18 0307 07/16/18   2250   NA  141   --   143  142   POTASSIUM  3.4   --   3.6  3.7   CHLORIDE  106   --   107  109   CO2  26   --   26  29   BUN  9   --   9  15   CR  0.55  0.63  0.58  0.90   ANIONGAP  8   --   10  4   KELBY  8.8   --   8.6  8.8   GLC  94   --   101*  115*       Sarah Thornton MD  [facility]

## 2018-07-23 NOTE — ED PROVIDER NOTES
"  History     Chief Complaint   Patient presents with     Foreign Body in Rectum     HPI  Nevin Alvarado is a 27 year old female with history of PTSD, anxiety, depression, borderline personality disorder, and self-injurious behavior including foreign body ingestions, placing objects in her rectum requiring surgical extraction, and intentional overdoses who presents for evaluation of a foreign body in her rectum. Notably, patient was seen most recently on 7/18/18 due to insertion of \"two glass bottles of essential oils\" into her rectum. Please see patient's Emergency Care Plan for further information.     Today, at ~7:30 PM, patient reports she was trying to \"pleasure herself\" with an \"micheline bottle with a squeeze top\", ~3 inches tall. She states she thought if she used \"a bigger bottle, it wouldn't get stuck\". Patient currently complains of abdominal pain and an inability to pass stool. She has not yet tried to pass gas. Patient denies nausea, vomiting, or oral ingestion of foreign bodies. She states she inserted only the one bottle.     Past Medical History:   Diagnosis Date     ADD (attention deficit disorder)      Anorexia nervosa with bulimia     history of; on Topamax     Anxiety      Borderline personality disorder      Depression      Depressive disorder      H/O self-harm      Lives in independent group home     due to debilitating mental illness     Migraine without aura     no known triggers; on Topamax bid and Imitrex PRN     Morbid obesity (H)      PTSD (post-traumatic stress disorder)      Rectal foreign body - Recurrent issue, self placed      Swallowed foreign body - Recurrent issue, self placed        Past Surgical History:   Procedure Laterality Date     ESOPHAGOSCOPY, GASTROSCOPY, DUODENOSCOPY (EGD), COMBINED N/A 3/9/2017    Procedure: COMBINED ESOPHAGOSCOPY, GASTROSCOPY, DUODENOSCOPY (EGD), REMOVE FOREIGN BODY;  Surgeon: Avis Guzmán MD;  Location: [facility]     ESOPHAGOSCOPY, " GASTROSCOPY, DUODENOSCOPY (EGD), COMBINED N/A 4/20/2017    Procedure: COMBINED ESOPHAGOSCOPY, GASTROSCOPY, DUODENOSCOPY (EGD), REMOVE FOREIGN BODY;  EGD removal Foregin body;  Surgeon: Lokesh Paula MD;  Location: [facility]     ESOPHAGOSCOPY, GASTROSCOPY, DUODENOSCOPY (EGD), COMBINED N/A 6/12/2017    Procedure: COMBINED ESOPHAGOSCOPY, GASTROSCOPY, DUODENOSCOPY (EGD);  COMBINED ESOPHAGOSCOPY, GASTROSCOPY, DUODENOSCOPY (EGD) [[id number]]attempted removal of foreign body;  Surgeon: Pamela Perez MD;  [facility]     ESOPHAGOSCOPY, GASTROSCOPY, DUODENOSCOPY (EGD), COMBINED N/A 6/9/2017    Procedure: COMBINED ESOPHAGOSCOPY, GASTROSCOPY, DUODENOSCOPY (EGD), REMOVE FOREIGN BODY;  Esophagoscopy, Gastroscopy, Duodenoscopy, Removal of Foreign Body;  Surgeon: Dejon Marsh MD;  Location: [facility]     ESOPHAGOSCOPY, GASTROSCOPY, DUODENOSCOPY (EGD), COMBINED N/A 1/6/2018    Procedure: COMBINED ESOPHAGOSCOPY, GASTROSCOPY, DUODENOSCOPY (EGD), REMOVE FOREIGN BODY;  COMBINED ESOPHAGOSCOPY, GASTROSCOPY, DUODENOSCOPY (EGD) [by pascal net and snare with profol sedation;  Surgeon: Feliciano Emmanuel MD;  Location:  GI     ESOPHAGOSCOPY, GASTROSCOPY, DUODENOSCOPY (EGD), COMBINED N/A 3/19/2018    Procedure: COMBINED ESOPHAGOSCOPY, GASTROSCOPY, DUODENOSCOPY (EGD), REMOVE FOREIGN BODY;   Esophagodscopy, Gastroscopy, Duodenoscopy,Foreign Body Removal;  Surgeon: Brice Guzmán MD;  [facility]: [facility]     ESOPHAGOSCOPY, GASTROSCOPY, DUODENOSCOPY (EGD), COMBINED N/A 4/16/2018    Procedure: COMBINED ESOPHAGOSCOPY, GASTROSCOPY, DUODENOSCOPY (EGD), REMOVE FOREIGN BODY;  Esophagogastroduodenoscopy  Foreign Body Removal X 2;  Surgeon: Royer Moise MD;  [facility]     ESOPHAGOSCOPY, GASTROSCOPY, DUODENOSCOPY (EGD), COMBINED N/A 6/1/2018    Procedure: COMBINED ESOPHAGOSCOPY, GASTROSCOPY, DUODENOSCOPY (EGD), REMOVE FOREIGN BODY;  COMBINED ESOPHAGOSCOPY, GASTROSCOPY, DUODENOSCOPY with removal of foreign  body, propofol sedation by anesthesia;  Surgeon: Brice aMrtinez MD;  Location: [facility]     EXAM UNDER ANESTHESIA ANUS N/A 1/10/2017    Procedure: EXAM UNDER ANESTHESIA ANUS;  Surgeon: Annmarie Haynes MD;  [facility]: [facility]     EXAM UNDER ANESTHESIA RECTUM N/A 7/19/2018    Procedure: EXAM UNDER ANESTHESIA RECTUM;  EXAM UNDER ANESTHESIA, REMOVAL OF RECTAL FOREIGN BODY;  Surgeon: Annmarie Haynes MD;  Location: [facility]     HC REMOVE FECAL IMPACTION OR FB W ANESTHESIA N/A 12/18/2016    Procedure: REMOVE FECAL IMPACTION/FOREIGN BODY UNDER ANESTHESIA;  Surgeon: Soham Cano MD;  Location: [facility]     KNEE SURGERY - removed a small tissue mass from knee Right      LAPAROSCOPIC ABLATION ENDOMETRIOSIS       LAPAROTOMY EXPLORATORY N/A 1/10/2017    Procedure: LAPAROTOMY EXPLORATORY;  Surgeon: Annmarie Haynes MD;  Location: [facility]     lymph nodes removed from neck; benign  age 7     MAMMOPLASTY REDUCTION Bilateral      RELEASE CARPAL TUNNEL Bilateral      SIGMOIDOSCOPY FLEXIBLE N/A 1/10/2017    Procedure: SIGMOIDOSCOPY FLEXIBLE;  Surgeon: Annmarie Haynes MD;  Location: [facility]     SIGMOIDOSCOPY FLEXIBLE N/A 5/8/2018    Procedure: SIGMOIDOSCOPY FLEXIBLE;  flex sig with foreign body removal using snare and rattooth forcep;  Surgeon: Soham Cano MD;  Location: [facility]       Family History   Problem Relation Age of Onset     Type 2 Diabetes Maternal Grandmother      Type 2 Diabetes Paternal Grandmother      Breast Cancer Paternal Grandmother      Cerebrovascular Disease Father 53     Myocardial Infarction No family hx of      Coronary Artery Disease Early Onset No family hx of      Ovarian Cancer No family hx of      Colon Cancer No family hx of        Social History   Substance Use Topics     Smoking status: Never Smoker     Smokeless tobacco: Never Used     Alcohol use No       No current facility-administered medications for this encounter.      Current Outpatient Prescriptions  "  Medication     CLONIDINE HCL PO     Desvenlafaxine Succinate (PRISTIQ PO)     lurasidone (LATUDA) 20 MG TABS tablet     acetaminophen (TYLENOL) 325 MG tablet     Cholecalciferol (VITAMIN D3 PO)     levonorgestrel (MIRENA) 20 MCG/24HR IUD     MELATONIN PO     oxyCODONE IR (ROXICODONE) 5 MG tablet        Allergies   Allergen Reactions     Penicillins Anaphylaxis     Blood-Group Specific Substance Other (See Comments)     Patient has an anti-Cw and non-specific antibodies. Blood product orders may be delayed. Draw one red top and two purple top tubes for all type/screen/crossmatch orders.     Hydrocodone Nausea and Vomiting     vomiting for days     Influenza Vaccines Other (See Comments)     Oseltamivir Hives     med stopped, PN: med stopped     Vicodin [Hydrocodone-Acetaminophen] Nausea and Vomiting     Cephalosporins Rash     Lamotrigine Rash     Possibly associated with Lamictal.      Latex Rash       I have reviewed the Medications, Allergies, Past Medical and Surgical History, and Social History in the Epic system.    Review of Systems   Gastrointestinal: Positive for abdominal pain and constipation. Negative for nausea and vomiting.        Positive for foreign body in rectum   All other systems reviewed and are negative.      Physical Exam   BP: (!) 135/94  Pulse: 81  Temp: 98.3  F (36.8  C)  Resp: 18  Height: 157.5 cm (5' 2\")  Weight: 112.9 kg (249 lb)  SpO2: 97 %      Physical Exam   Constitutional: She is oriented to person, place, and time. She appears well-developed and well-nourished.   HENT:   Head: Normocephalic and atraumatic.   Neck: Normal range of motion.   Cardiovascular: Normal rate, regular rhythm and normal heart sounds.    Pulmonary/Chest: Effort normal and breath sounds normal. No respiratory distress.   Abdominal: Soft. She exhibits no distension. There is no tenderness. There is no rebound.   Genitourinary:   Genitourinary Comments: Rectal-no external lesions or bleeding;  "   Musculoskeletal: She exhibits no tenderness.   Neurological: She is alert and oriented to person, place, and time.   Skin: Skin is warm and dry.   Psychiatric: She has a normal mood and affect. Her behavior is normal. Thought content normal.       ED Course     ED Course     Procedures       9:30 PM  The patient was seen and examined by Dr. Cao in Room 12.          Critical Care time:  [facility] Procedure Note        Sedation:      Performed by: Emily Cao  Authorized by: Emily Cao    Pre-Procedure Assessment done at 2330.    Expected Level:  Moderate Sedation    Indication:  Sedation is required to allow for Foreign body removal    Consent obtained from patient after discussing the risks, benefits and alternatives.    PO Intake:  Not NPO but emergent condition outweighs risk.    ASA Class:  Class 2 - MILD SYSTEMIC DISEASE, NO ACUTE PROBLEMS, NO FUNCTIONAL LIMITATIONS.    Mallampati:  Grade 2:  Soft palate, base of uvula, tonsillar pillars, and portion of posterior pharyngeal wall visible    Lungs: Lungs Clear with good breath sounds bilaterally.     Heart: Normal heart sounds and rate    History and physical reviewed and no updates needed. I have reviewed the lab findings, diagnostic data, medications, and the plan for sedation. I have determined this patient to be an appropriate candidate for the planned sedation and procedure and have reassessed the patient IMMEDIATELY PRIOR to sedation and procedure.      Sedation Post Procedure Summary:    Prior to the start of the procedure and with procedural staff participation, I verbally confirmed the patient s identity using two indicators, relevant allergies, that the procedure was appropriate and matched the consent or emergent situation, and that the correct equipment/implants were available. Immediately prior to starting the procedure I conducted the Time Out with the procedural staff and re-confirmed the patient s name,  procedure, and site/side. (The Joint Commission universal protocol was followed.)  Yes      Sedatives: Propofol    Vital signs, airway, End Tidal CO2 and pulse oximetry were monitored and remained stable throughout the procedure and sedation was maintained until the procedure was complete.  The patient was monitored by staff until sedation discharge criteria were met.    Patient tolerance: Patient tolerated the procedure well with no immediate complications.    Time of sedation in minutes:  10 minutes from beginning to end of physician one to one monitoring.         Labs Ordered and Resulted from Time of ED Arrival Up to the Time of Departure from the ED - No data to display         Assessments & Plan (with Medical Decision Making): Patient is a 27 year old female who presented to the ER due to placing a small glass bottle into her rectum. Patient says that she was doing it to pleasure herself. Patient has done this multiple times in the past. Most recently she was here about five days prior and had a similar bottle that was up her rectum. Here patient has no signs of abdominal peritonitis. Her abdominal exam is soft. X-ray confirmed the bottle was in her lower pelvis region. Patient was seen by the Colorectal Team. We performed a bedside conscious sedation and Colorectal performed a sacral block and the bottle was removed. The procedure took less than 3 minutes. Patient tolerated the procedure well. Patient will be allowed to stay in the ER until she is more awake and alert and able to be discharged safely. Patient has no reasons to be kept against her will in a psychiatric hospital. She has no SI or HI. She is not acutely psychotic. Patient was seen by Psych when she was admitted to the hospital five days ago and they had no acute recommendations. She will therefore be discharged home when sedation has worn off and she is feeling better.    This part of the document was transcribed by Ry Gasca  Scribe.     I have reviewed the nursing notes.    I have reviewed the findings, diagnosis, plan and need for follow up with the patient.    New Prescriptions    No medications on file       Final diagnoses:   Foreign body in anus and rectum, initial encounter   I, Jeyson George, am serving as a trained medical scribe to document services personally performed by Emily Cao MD, based on the provider's statements to me.   I, Emily Cao MD, was physically present and have reviewed and verified the accuracy of this note documented by Jeyson George.      7/22/2018   [facility], [place], EMERGENCY DEPARTMENT     Emily Cao MD  07/23/18 0043

## 2018-07-24 ENCOUNTER — HOSPITAL ENCOUNTER (EMERGENCY)
Facility: CLINIC | Age: 28
Discharge: HOME | End: 2018-07-25
Attending: EMERGENCY MEDICINE | Admitting: INTERNAL MEDICINE
Payer: MEDICARE

## 2018-07-24 ENCOUNTER — APPOINTMENT (OUTPATIENT)
Dept: GENERAL RADIOLOGY | Facility: CLINIC | Age: 28
End: 2018-07-24
Attending: EMERGENCY MEDICINE
Payer: MEDICARE

## 2018-07-24 DIAGNOSIS — T18.9XXA SWALLOWED FOREIGN BODY, INITIAL ENCOUNTER: ICD-10-CM

## 2018-07-24 PROCEDURE — 96374 THER/PROPH/DIAG INJ IV PUSH: CPT

## 2018-07-24 PROCEDURE — 96375 TX/PRO/DX INJ NEW DRUG ADDON: CPT

## 2018-07-24 PROCEDURE — 74018 RADEX ABDOMEN 1 VIEW: CPT

## 2018-07-24 PROCEDURE — 99285 EMERGENCY DEPT VISIT HI MDM: CPT | Mod: 25

## 2018-07-24 ASSESSMENT — ENCOUNTER SYMPTOMS
ABDOMINAL PAIN: 1
NERVOUS/ANXIOUS: 1

## 2018-07-24 NOTE — ED AVS SNAPSHOT
Emergency Department    [address] [facility] [address]    Phone:  [phone]    Fax:  [phone]                                       Nevin Alvarado   MRN: [id number]    Department:   Emergency Department   Date of Visit:  7/24/2018           Patient Information     Date Of Birth          1990        Your diagnoses for this visit were:     Swallowed foreign body, initial encounter        You were seen by Trierweiler, Chad A, MD and Fausto May MD.      Follow-up Information     Schedule an appointment as soon as possible for a visit with Your psychiatrist.        Follow up with  Emergency Department.    Specialty:  EMERGENCY MEDICINE    Why:  If symptoms worsen    Contact information:    [address] [facility] [id number] [phone]        Discharge Instructions         Swallowed Foreign Body (Adult)  Indigestible objects (foreign bodies) are sometimes swallowed by adults. Whether or not the object moves all the way through the digestive tract depends on many factors. This includes the size and shape of the object, whether the object is sharp and pointy, and what the object is made of.  Based on your evaluation, no treatment is needed at this time. The swallowed object is expected to move through your digestive tract and pass out of the body in the stool with no problems. This may take about 24 to 48 hours, but could take longer depending on your bowel habits. If imaging tests were done, you will be told when the results are ready and if they affect your treatment.  Home care    Follow any instructions from your provider about eating and drinking. In certain cases, you may be told to only eat soft foods and drink liquids for the first 24 to 48 hours.    You will need to check your stool each time you have a bowel movement. This is so you can confirm that the object has passed, and look for signs of bleeding. If the object does  not pass, it may mean that the object is stuck somewhere along the digestive tract. In such cases, the object may need to be removed with a procedure.  Follow-up care  Follow up with your healthcare provider as advised. You will be told if further treatment is needed. In certain cases, you may need to return to have imaging tests done. Call your healthcare provider if you have any questions or concerns.  When to seek medical advice  Call your healthcare provider right away if any of these occur:    Belly pain, cramps, or swelling    Shortness of breath or coughing that won t stop    Trouble swallowing or pain with swallowing    Vomiting that won t stop    Blood in the stool (dark red or black color)    Fever of 100.4 F (38 C) or higher, or as directed by your provider  Call 911  Call 911 if any of these occur:    Trouble breathing, wheezing    Trouble speaking    Unusually fast heart rate    New or worsening chest pain    Vomiting blood (red or black)    Swelling of the neck    Inability to pass stool  Date Last Reviewed: 8/1/2017 2000-2017 [employer]. [address] [place] [address]. All rights reserved. This information is not intended as a substitute for professional medical care. Always follow your healthcare professional's instructions.          24 Hour Appointment Hotline       To make an appointment at any [facility], call [phone] ([phone]). If you don't have a family doctor or clinic, we will help you find one. [facility] clinics are conveniently located to serve the needs of you and your family.             Review of your medicines      Our records show that you are taking the medicines listed below. If these are incorrect, please call your family doctor or clinic.        Dose / Directions Last dose taken    acetaminophen 325 MG tablet   Commonly known as:  TYLENOL   Dose:  650 mg   Quantity:  10 tablet        Take 2 tablets (650 mg) by mouth every 8 hours    Refills:  0        CLONIDINE HCL PO   Dose:  0.1 mg        Take 0.1 mg by mouth 2 times daily   Refills:  0        levonorgestrel 20 MCG/24HR IUD   Commonly known as:  MIRENA   Dose:  1 each        1 each (20 mcg) by Intrauterine route once for 1 dose   Refills:  0        lurasidone 20 MG Tabs tablet   Commonly known as:  LATUDA   Dose:  40 mg   Quantity:  12 tablet        Take 2 tablets (40 mg) by mouth every evening   Refills:  0        MELATONIN PO   Dose:  3 mg        Take 3 mg by mouth nightly as needed (sleep)   Refills:  0        oxyCODONE IR 5 MG tablet   Commonly known as:  ROXICODONE   Dose:  5 mg   Quantity:  5 tablet        Take 1 tablet (5 mg) by mouth every 3 hours as needed for moderate to severe pain or other (pain control or improvement in physical function. Hold dose for analgesic side effects.)   Refills:  0        PRISTIQ PO   Dose:  100 mg        Take 100 mg by mouth every morning   Refills:  0        VITAMIN D3 PO   Dose:  2000 Units        Take 2,000 Units by mouth every morning   Refills:  0                Procedures and tests performed during your visit     Abdomen XR 1 vw    UPPER GI ENDOSCOPY      Orders Needing Specimen Collection     None      Pending Results     No orders found for last 3 day(s).            Pending Culture Results     No orders found for last 3 day(s).            Pending Results Instructions     If you had any lab results that were not finalized at the time of your Discharge, you can call the ED Lab Result RN at [phone]. You will be contacted by this team for any positive Lab results or changes in treatment. The nurses are available 7 days a week from 10A to 6:30P.  You can leave a message 24 hours per day and they will return your call.        Test Results From Your Hospital Stay        7/25/2018 12:22 AM      Narrative     ABDOMEN SINGLE VIEW  7/24/2018 11:04 PM     HISTORY: Foreign body.    COMPARISON: 7/22/2018.    FINDINGS: An 8 cm long x 0.5 cm diameter  coiled metallic spring  projects over the upper left hemiabdomen in the region of the gastric  body. Gas is present within nondilated small and large bowel. A very  small amount of stool is present in the colon. No visualized bowel  wall thickening or pneumatosis.        Impression     IMPRESSION:  1. An 8 cm long x 0.5 cm diameter coiled metallic spring is present in  the stomach.  2. No evidence of bowel obstruction.    FRANCES JAEGER MD                Clinical Quality Measure: Blood Pressure Screening     Your blood pressure was checked while you were in the emergency department today. The last reading we obtained was  BP: 111/72 . Please read the guidelines below about what these numbers mean and what you should do about them.  If your systolic blood pressure (the top number) is less than 120 and your diastolic blood pressure (the bottom number) is less than 80, then your blood pressure is normal. There is nothing more that you need to do about it.  If your systolic blood pressure (the top number) is 120-139 or your diastolic blood pressure (the bottom number) is 80-89, your blood pressure may be higher than it should be. You should have your blood pressure rechecked within a year by a primary care provider.  If your systolic blood pressure (the top number) is 140 or greater or your diastolic blood pressure (the bottom number) is 90 or greater, you may have high blood pressure. High blood pressure is treatable, but if left untreated over time it can put you at risk for heart attack, stroke, or kidney failure. You should have your blood pressure rechecked by a primary care provider within the next 4 weeks.  If your provider in the emergency department today gave you specific instructions to follow-up with your doctor or provider even sooner than that, you should follow that instruction and not wait for up to 4 weeks for your follow-up visit.        Thank you for choosing [facility]       Thank you for choosing  [facility] for your care. Our goal is always to provide you with excellent care. Hearing back from our patients is one way we can continue to improve our services. Please take a few minutes to complete the written survey that you may receive in the mail after you visit with us. Thank you!        [facility] Information     [facility] gives you secure access to your electronic health record. If you see a primary care provider, you can also send messages to your care team and make appointments. If you have questions, please call your primary care clinic.  If you do not have a primary care provider, please call [phone] and they will assist you.        Care EveryWhere ID     This is your Care EveryWhere ID. This could be used by other organizations to access your [facility] medical records  [id number]        Equal Access to Services     ZENON DIAMOND : Gabriel Collado, erick singh, taylor tenorio, mci persaud. So [facility] [phone].    ATENCIÓN: Si habla español, tiene a singh disposición servicios gratuitos de asistencia lingüística. Llame al [phone].    We comply with applicable federal civil rights laws and [place] laws. We do not discriminate on the basis of race, color, national origin, age, disability, sex, sexual orientation, or gender identity.            After Visit Summary       This is your record. Keep this with you and show to your community pharmacist(s) and doctor(s) at your next visit.

## 2018-07-24 NOTE — ED AVS SNAPSHOT
Emergency Department    [address] [place] [address]    Phone:  [phone]    Fax:  [phone]                                       Nevin Alvarado   MRN: [id number]    Department:   Emergency Department   Date of Visit:  7/24/2018           After Visit Summary Signature Page     I have received my discharge instructions, and my questions have been answered. I have discussed any challenges I see with this plan with the nurse or doctor.    ..........................................................................................................................................  Patient/Patient Representative Signature      ..........................................................................................................................................  Patient Representative Print Name and Relationship to Patient    ..................................................               ................................................  Date                                            Time    ..........................................................................................................................................  Reviewed by Signature/Title    ...................................................              ..............................................  Date                                                            Time

## 2018-07-25 ENCOUNTER — PATIENT OUTREACH (OUTPATIENT)
Dept: CARE COORDINATION | Facility: CLINIC | Age: 28
End: 2018-07-25

## 2018-07-25 VITALS
HEIGHT: 62 IN | SYSTOLIC BLOOD PRESSURE: 111 MMHG | TEMPERATURE: 97.9 F | BODY MASS INDEX: 45.82 KG/M2 | RESPIRATION RATE: 9 BRPM | DIASTOLIC BLOOD PRESSURE: 72 MMHG | WEIGHT: 249 LBS | OXYGEN SATURATION: 96 %

## 2018-07-25 LAB — UPPER GI ENDOSCOPY: NORMAL

## 2018-07-25 PROCEDURE — 43247 EGD REMOVE FOREIGN BODY: CPT | Performed by: INTERNAL MEDICINE

## 2018-07-25 PROCEDURE — 96374 THER/PROPH/DIAG INJ IV PUSH: CPT | Mod: 59

## 2018-07-25 PROCEDURE — 25000128 H RX IP 250 OP 636

## 2018-07-25 PROCEDURE — 96375 TX/PRO/DX INJ NEW DRUG ADDON: CPT | Mod: 59

## 2018-07-25 RX ORDER — DIPHENHYDRAMINE HYDROCHLORIDE 50 MG/ML
INJECTION INTRAMUSCULAR; INTRAVENOUS
Status: COMPLETED
Start: 2018-07-25 | End: 2018-07-25

## 2018-07-25 RX ORDER — FENTANYL CITRATE 50 UG/ML
INJECTION, SOLUTION INTRAMUSCULAR; INTRAVENOUS
Status: DISCONTINUED
Start: 2018-07-25 | End: 2018-07-25 | Stop reason: HOSPADM

## 2018-07-25 RX ORDER — FENTANYL CITRATE 50 UG/ML
250 INJECTION, SOLUTION INTRAMUSCULAR; INTRAVENOUS ONCE
Status: COMPLETED | OUTPATIENT
Start: 2018-07-25 | End: 2018-07-25

## 2018-07-25 RX ORDER — FENTANYL CITRATE 50 UG/ML
INJECTION, SOLUTION INTRAMUSCULAR; INTRAVENOUS
Status: COMPLETED
Start: 2018-07-25 | End: 2018-07-25

## 2018-07-25 RX ORDER — DIPHENHYDRAMINE HYDROCHLORIDE 50 MG/ML
50 INJECTION INTRAMUSCULAR; INTRAVENOUS ONCE
Status: COMPLETED | OUTPATIENT
Start: 2018-07-25 | End: 2018-07-25

## 2018-07-25 RX ADMIN — FENTANYL CITRATE 250 MCG: 50 INJECTION, SOLUTION INTRAMUSCULAR; INTRAVENOUS at 01:09

## 2018-07-25 RX ADMIN — FENTANYL CITRATE 250 MCG: 50 INJECTION INTRAMUSCULAR; INTRAVENOUS at 01:09

## 2018-07-25 RX ADMIN — MIDAZOLAM HYDROCHLORIDE 6 MG: 1 INJECTION, SOLUTION INTRAMUSCULAR; INTRAVENOUS at 01:07

## 2018-07-25 RX ADMIN — DIPHENHYDRAMINE HYDROCHLORIDE 50 MG: 50 INJECTION, SOLUTION INTRAMUSCULAR; INTRAVENOUS at 01:08

## 2018-07-25 RX ADMIN — DIPHENHYDRAMINE HYDROCHLORIDE 50 MG: 50 INJECTION INTRAMUSCULAR; INTRAVENOUS at 01:08

## 2018-07-25 NOTE — ED NOTES
Pt tolerated procedure well. Pt is awake and answering questions. Report given to CARMEN Helm and patient returned to room 30.

## 2018-07-25 NOTE — ED NOTES
Pt requesting to talk to to a  or psychiatrist at this time, will not elaborate on what she wants to talk about. MD at bedside, will call and f/u with her psychiatrist tomorrow

## 2018-07-25 NOTE — ED PROVIDER NOTES
"  History     Chief Complaint:  Swallowed Foreign Body    The history is provided by the patient.      Nevin Alvarado is a 27 year old female with a history of borderline personality disorder and anxiety who presents to the emergency department for evaluation of swallowed foreign body. Of note, the patient has a multitude of past encounters in the emergency room for swallowed, rectally inserted and vaginally inserted foreign bodies. Here, the patient presents for mild abdominal pain from swallowing a pen spring that she elongated to approximately 8 cm. The patient reports that she has been struggling with anxiety for a while causing these episodes.     Allergies:  Penicillins  Hydrocodone   Oseltamivir  Vicodin  Cephalosporins  Lamotrigine  Latex    Medications:    Acetaminophen  Cholecalciferol  Clonidine  Desvenlafaxine Succinate  Levnorgestrel  Lurasidone   Melatonin    Past Medical History:    ADD  Anorexia Nervosa with/ Bulimia  Borderline Personality  Depression  Depressive Disorder  Migraine  Morbid Obesity  Recurrent Swallowing of Foreign Bodies  Recurrent Rectal Foreign Bodies  Recurrent Vaginal Foreign Bodies    Past Surgical History:    Esophagoscopy, Gastroscopy, Duodenoscopy x7  Exploratory Laparotomy x3  Sigmoidoscopy x2    Family History:    Type 2 Diabetes  Breast Cancer  Cerebrovascular Disease    Social History:  Marital Status:  Single [1]  Tobacco Use: No  Alcohol Use: No    Review of Systems   Gastrointestinal: Positive for abdominal pain.   Psychiatric/Behavioral: The patient is nervous/anxious.    All other systems reviewed and are negative.      Physical Exam     Patient Vitals for the past 24 hrs:   BP Temp Temp src Heart Rate Resp SpO2 Height Weight   07/24/18 2049 145/83 97.9  F (36.6  C) Oral 91 16 98 % 1.575 m (5' 2\") 112.9 kg (249 lb)       Physical Exam  Eye:  Pupils are equal, round, and reactive.  Extraocular movements intact.    ENT:  No rhinorrhea.  Moist mucus membranes.  " Normal tongue and tonsil.    Cardiac:  Regular rate and rhythm.  No murmurs, gallops, or rubs.    Pulmonary:  Clear to auscultation bilaterally.  No wheezes, rales, or rhonchi.    Abdomen:  Positive bowel sounds.  Abdomen is soft and non-distended, without focal tenderness.    Musculoskeletal:  Normal movement of all extremities without evidence for deficit.    Skin:  Warm and dry without rashes.    Neurologic:  Non-focal exam without asymmetric weakness or numbness.     Psychiatric:  Normal affect with appropriate interaction with examiner.      Emergency Department Course     Imaging:  XR Abdomen 1 view:   1. An 8 cm long x 0.5 cm diameter coiled metallic spring is present in the stomach.  2. No evidence of bowel obstruction. As per radiology.     Interventions:  Medications - No data to display    Emergency Department Course:  2238 Nursing notes and vitals reviewed. I performed an exam of the patient as documented above.     The patient was sent for an abdominal xray while in the emergency department, findings above.     2321 I consulted with Dr. Greco of [facility] concerning the patient's presentation    2350 I rechecked the patient and discussed the results of her workup thus far.     Patient signed out to Dr. May    Impression & Plan      Medical Decision Making:  This unfortunate young woman with a long history of self-injurious behavior by ingestion of different objects presents to us after swallowing a metal spring from a pen.  This is confirmed to be in the stomach.  Considering concerns that this may cause perforation or obstruction, I spoke with gastroenterology who agrees to remove the spring in the emergency department under sedation.  The patient is amenable to this option.  I do not feel that further workup is indicated at this time.  The patient was signed out to the oncoming emergency physician, Dr. May, who will discharge the patient assuming that extraction goes without  difficulty.      Diagnosis:    ICD-10-CM    1. Swallowed foreign body, initial encounter T18.9XXA        Disposition:  Signed out to Dr. May    Discharge Medications:  New Prescriptions    No medications on file     Scribe Disclosure:  I, Sylvester Lovell, am serving as a scribe on 7/24/2018 at 10:41 PM to personally document services performed by Trierweiler, Chad A, MD based on my observations and the provider's statements to me.       Sylvester Lovell  7/24/2018    EMERGENCY DEPARTMENT       Trierweiler, Chad A, MD  07/25/18 1257

## 2018-07-25 NOTE — PROGRESS NOTES
Clinic Care Coordination Contact      Situation: Patient chart reviewed by care coordinator.      Background: MH pt is managed through [facility], has MH CM, Psychiatry, therapy in place. Pt only utilizes [place] [facility]. PCP with [facility]      Assessment: Chronic/Persistantly MI      Plan/Recommendations: Not open to [facility] services. Not a [facility] pt.       LEATHA Mayers  Care Coordinator  [phone]  [email or web address]

## 2018-07-25 NOTE — ED NOTES
Sign out taken from my colleague, Dr. Trierweiler.  Nevin is a very pleasant 27-year-old female who unfortunately  ingested a metal spring.  This was successfully removed by our gastroenterologist here in the emergency room, and patient was monitored for an hour after the procedure.  She tolerated orals, had a soft abdomen, and denied any intentionality for self-harm.  She states she feels safe at home, appears remorseful, and unfortunately she does have a long history of similar ingestions, but I see no indication to hold her here in the emergency room against her well.  Will discharge very clear return to ED precautions and red flags for when to come back.  Patient can see her psychiatrist this week she states.     Fausto May MD  07/25/18 6799

## 2018-07-25 NOTE — OR NURSING
Removal of foreign body in ED. Sedation and monitoring per ED RN.  Object retrieved with Rat tooth forcep. No specimens.

## 2018-07-25 NOTE — ANESTHESIA POSTPROCEDURE EVALUATION
Patient: Nevin Alvarado    Procedure(s):  EXAM UNDER ANESTHESIA, REMOVAL OF RECTAL FOREIGN BODY - Wound Class: I-Clean    Diagnosis:EX LAP  Diagnosis Additional Information: No value filed.    Anesthesia Type:  No value filed.    Note:  Anesthesia Post Evaluation    Patient location during evaluation: PACU  Patient participation: Able to fully participate in evaluation  Level of consciousness: awake  Pain management: adequate  Airway patency: patent  Cardiovascular status: acceptable  Respiratory status: acceptable  Hydration status: acceptable  PONV: none             Last vitals:  Vitals:    07/19/18 0635 07/19/18 0705 07/19/18 1300   BP: 130/74 112/72 130/63   Pulse:   92   Resp:   14   Temp:      SpO2:   95%         Electronically Signed By: Xavier Francisco MD  July 24, 2018  10:21 PM

## 2018-07-25 NOTE — DISCHARGE INSTRUCTIONS
Swallowed Foreign Body (Adult)  Indigestible objects (foreign bodies) are sometimes swallowed by adults. Whether or not the object moves all the way through the digestive tract depends on many factors. This includes the size and shape of the object, whether the object is sharp and pointy, and what the object is made of.  Based on your evaluation, no treatment is needed at this time. The swallowed object is expected to move through your digestive tract and pass out of the body in the stool with no problems. This may take about 24 to 48 hours, but could take longer depending on your bowel habits. If imaging tests were done, you will be told when the results are ready and if they affect your treatment.  Home care    Follow any instructions from your provider about eating and drinking. In certain cases, you may be told to only eat soft foods and drink liquids for the first 24 to 48 hours.    You will need to check your stool each time you have a bowel movement. This is so you can confirm that the object has passed, and look for signs of bleeding. If the object does not pass, it may mean that the object is stuck somewhere along the digestive tract. In such cases, the object may need to be removed with a procedure.  Follow-up care  Follow up with your healthcare provider as advised. You will be told if further treatment is needed. In certain cases, you may need to return to have imaging tests done. Call your healthcare provider if you have any questions or concerns.  When to seek medical advice  Call your healthcare provider right away if any of these occur:    Belly pain, cramps, or swelling    Shortness of breath or coughing that won t stop    Trouble swallowing or pain with swallowing    Vomiting that won t stop    Blood in the stool (dark red or black color)    Fever of 100.4 F (38 C) or higher, or as directed by your provider  Call 911  Call 911 if any of these occur:    Trouble breathing, wheezing    Trouble  speaking    Unusually fast heart rate    New or worsening chest pain    Vomiting blood (red or black)    Swelling of the neck    Inability to pass stool  Date Last Reviewed: 8/1/2017 2000-2017 [employer]. [address] [place] [address], [place] [address]. All rights reserved. This information is not intended as a substitute for professional medical care. Always follow your healthcare professional's instructions.

## 2018-07-27 ENCOUNTER — APPOINTMENT (OUTPATIENT)
Dept: GENERAL RADIOLOGY | Facility: CLINIC | Age: 28
End: 2018-07-27
Attending: EMERGENCY MEDICINE
Payer: MEDICARE

## 2018-07-27 PROCEDURE — 74019 RADEX ABDOMEN 2 VIEWS: CPT

## 2018-07-27 PROCEDURE — 99284 EMERGENCY DEPT VISIT MOD MDM: CPT | Mod: 25 | Performed by: EMERGENCY MEDICINE

## 2018-07-27 PROCEDURE — 81025 URINE PREGNANCY TEST: CPT | Performed by: EMERGENCY MEDICINE

## 2018-07-28 ENCOUNTER — ANESTHESIA EVENT (OUTPATIENT)
Dept: SURGERY | Facility: CLINIC | Age: 28
End: 2018-07-28
Payer: MEDICARE

## 2018-07-28 ENCOUNTER — APPOINTMENT (OUTPATIENT)
Dept: GENERAL RADIOLOGY | Facility: CLINIC | Age: 28
End: 2018-07-28
Attending: STUDENT IN AN ORGANIZED HEALTH CARE EDUCATION/TRAINING PROGRAM
Payer: MEDICARE

## 2018-07-28 ENCOUNTER — ANESTHESIA (OUTPATIENT)
Dept: SURGERY | Facility: CLINIC | Age: 28
End: 2018-07-28
Payer: MEDICARE

## 2018-07-28 ENCOUNTER — HOSPITAL ENCOUNTER (OUTPATIENT)
Facility: CLINIC | Age: 28
Discharge: HOME | End: 2018-07-28
Attending: EMERGENCY MEDICINE | Admitting: INTERNAL MEDICINE
Payer: MEDICARE

## 2018-07-28 VITALS
BODY MASS INDEX: 45.54 KG/M2 | HEART RATE: 90 BPM | WEIGHT: 249 LBS | OXYGEN SATURATION: 97 % | RESPIRATION RATE: 16 BRPM | SYSTOLIC BLOOD PRESSURE: 118 MMHG | TEMPERATURE: 98.1 F | DIASTOLIC BLOOD PRESSURE: 79 MMHG

## 2018-07-28 DIAGNOSIS — T18.9XXA SWALLOWED FOREIGN BODY, INITIAL ENCOUNTER: ICD-10-CM

## 2018-07-28 LAB
ALBUMIN UR-MCNC: NEGATIVE MG/DL
APPEARANCE UR: CLEAR
BILIRUB UR QL STRIP: NEGATIVE
COLOR UR AUTO: NORMAL
GLUCOSE UR STRIP-MCNC: NEGATIVE MG/DL
HCG UR QL: NEGATIVE
HGB UR QL STRIP: NEGATIVE
INTERNAL QC OK POCT: YES
KETONES UR STRIP-MCNC: NEGATIVE MG/DL
LEUKOCYTE ESTERASE UR QL STRIP: NEGATIVE
NITRATE UR QL: NEGATIVE
PH UR STRIP: 7 PH (ref 5–7)
RBC #/AREA URNS AUTO: 1 /HPF (ref 0–2)
SOURCE: NORMAL
SP GR UR STRIP: 1.01 (ref 1–1.03)
SQUAMOUS #/AREA URNS AUTO: 1 /HPF (ref 0–1)
UPPER GI ENDOSCOPY: NORMAL
UROBILINOGEN UR STRIP-MCNC: NORMAL MG/DL (ref 0–2)
WBC #/AREA URNS AUTO: 1 /HPF (ref 0–5)

## 2018-07-28 PROCEDURE — 99284 EMERGENCY DEPT VISIT MOD MDM: CPT | Mod: Z6 | Performed by: EMERGENCY MEDICINE

## 2018-07-28 PROCEDURE — 36000053 ZZH SURGERY LEVEL 2 EA 15 ADDTL MIN - UMMC: Performed by: INTERNAL MEDICINE

## 2018-07-28 PROCEDURE — 25000125 ZZHC RX 250: Performed by: NURSE ANESTHETIST, CERTIFIED REGISTERED

## 2018-07-28 PROCEDURE — 37000009 ZZH ANESTHESIA TECHNICAL FEE, EACH ADDTL 15 MIN: Performed by: INTERNAL MEDICINE

## 2018-07-28 PROCEDURE — A9270 NON-COVERED ITEM OR SERVICE: HCPCS | Performed by: INTERNAL MEDICINE

## 2018-07-28 PROCEDURE — 81001 URINALYSIS AUTO W/SCOPE: CPT | Performed by: EMERGENCY MEDICINE

## 2018-07-28 PROCEDURE — 25000128 H RX IP 250 OP 636: Performed by: NURSE ANESTHETIST, CERTIFIED REGISTERED

## 2018-07-28 PROCEDURE — 25000125 ZZHC RX 250: Performed by: INTERNAL MEDICINE

## 2018-07-28 PROCEDURE — 25000128 H RX IP 250 OP 636: Performed by: ANESTHESIOLOGY

## 2018-07-28 PROCEDURE — 36000051 ZZH SURGERY LEVEL 2 1ST 30 MIN - UMMC: Performed by: INTERNAL MEDICINE

## 2018-07-28 PROCEDURE — 87086 URINE CULTURE/COLONY COUNT: CPT | Performed by: EMERGENCY MEDICINE

## 2018-07-28 PROCEDURE — 25000566 ZZH SEVOFLURANE, EA 15 MIN: Performed by: INTERNAL MEDICINE

## 2018-07-28 PROCEDURE — 37000008 ZZH ANESTHESIA TECHNICAL FEE, 1ST 30 MIN: Performed by: INTERNAL MEDICINE

## 2018-07-28 PROCEDURE — 71000015 ZZH RECOVERY PHASE 1 LEVEL 2 EA ADDTL HR: Performed by: INTERNAL MEDICINE

## 2018-07-28 PROCEDURE — 40000170 ZZH STATISTIC PRE-PROCEDURE ASSESSMENT II: Performed by: INTERNAL MEDICINE

## 2018-07-28 PROCEDURE — 71000014 ZZH RECOVERY PHASE 1 LEVEL 2 FIRST HR: Performed by: INTERNAL MEDICINE

## 2018-07-28 PROCEDURE — 40000985 XR ABDOMEN PORT 1 VW

## 2018-07-28 PROCEDURE — 27210794 ZZH OR GENERAL SUPPLY STERILE: Performed by: INTERNAL MEDICINE

## 2018-07-28 RX ORDER — FENTANYL CITRATE 50 UG/ML
25-50 INJECTION, SOLUTION INTRAMUSCULAR; INTRAVENOUS
Status: DISCONTINUED | OUTPATIENT
Start: 2018-07-28 | End: 2018-07-28 | Stop reason: HOSPADM

## 2018-07-28 RX ORDER — ONDANSETRON 2 MG/ML
INJECTION INTRAMUSCULAR; INTRAVENOUS PRN
Status: DISCONTINUED | OUTPATIENT
Start: 2018-07-28 | End: 2018-07-28

## 2018-07-28 RX ORDER — SODIUM CHLORIDE 9 MG/ML
INJECTION, SOLUTION INTRAVENOUS CONTINUOUS PRN
Status: DISCONTINUED | OUTPATIENT
Start: 2018-07-28 | End: 2018-07-28

## 2018-07-28 RX ORDER — LIDOCAINE 40 MG/G
CREAM TOPICAL
Status: DISCONTINUED | OUTPATIENT
Start: 2018-07-28 | End: 2018-07-28 | Stop reason: HOSPADM

## 2018-07-28 RX ORDER — MEPERIDINE HYDROCHLORIDE 25 MG/ML
12.5 INJECTION INTRAMUSCULAR; INTRAVENOUS; SUBCUTANEOUS
Status: DISCONTINUED | OUTPATIENT
Start: 2018-07-28 | End: 2018-07-28 | Stop reason: HOSPADM

## 2018-07-28 RX ORDER — SODIUM CHLORIDE, SODIUM LACTATE, POTASSIUM CHLORIDE, CALCIUM CHLORIDE 600; 310; 30; 20 MG/100ML; MG/100ML; MG/100ML; MG/100ML
INJECTION, SOLUTION INTRAVENOUS CONTINUOUS
Status: DISCONTINUED | OUTPATIENT
Start: 2018-07-28 | End: 2018-07-28 | Stop reason: HOSPADM

## 2018-07-28 RX ORDER — DIPHENHYDRAMINE HYDROCHLORIDE 50 MG/ML
25 INJECTION INTRAMUSCULAR; INTRAVENOUS ONCE
Status: COMPLETED | OUTPATIENT
Start: 2018-07-28 | End: 2018-07-28

## 2018-07-28 RX ORDER — NALOXONE HYDROCHLORIDE 0.4 MG/ML
.1-.4 INJECTION, SOLUTION INTRAMUSCULAR; INTRAVENOUS; SUBCUTANEOUS
Status: DISCONTINUED | OUTPATIENT
Start: 2018-07-28 | End: 2018-07-28 | Stop reason: HOSPADM

## 2018-07-28 RX ORDER — ONDANSETRON 2 MG/ML
4 INJECTION INTRAMUSCULAR; INTRAVENOUS EVERY 30 MIN PRN
Status: DISCONTINUED | OUTPATIENT
Start: 2018-07-28 | End: 2018-07-28 | Stop reason: HOSPADM

## 2018-07-28 RX ORDER — ONDANSETRON 4 MG/1
4 TABLET, ORALLY DISINTEGRATING ORAL EVERY 30 MIN PRN
Status: DISCONTINUED | OUTPATIENT
Start: 2018-07-28 | End: 2018-07-28 | Stop reason: HOSPADM

## 2018-07-28 RX ORDER — LIDOCAINE HYDROCHLORIDE 20 MG/ML
INJECTION, SOLUTION INFILTRATION; PERINEURAL PRN
Status: DISCONTINUED | OUTPATIENT
Start: 2018-07-28 | End: 2018-07-28

## 2018-07-28 RX ORDER — PROPOFOL 10 MG/ML
INJECTION, EMULSION INTRAVENOUS PRN
Status: DISCONTINUED | OUTPATIENT
Start: 2018-07-28 | End: 2018-07-28

## 2018-07-28 RX ORDER — FENTANYL CITRATE 50 UG/ML
INJECTION, SOLUTION INTRAMUSCULAR; INTRAVENOUS PRN
Status: DISCONTINUED | OUTPATIENT
Start: 2018-07-28 | End: 2018-07-28

## 2018-07-28 RX ADMIN — PROCHLORPERAZINE EDISYLATE 5 MG: 5 INJECTION INTRAMUSCULAR; INTRAVENOUS at 10:06

## 2018-07-28 RX ADMIN — FENTANYL CITRATE 25 MCG: 50 INJECTION INTRAMUSCULAR; INTRAVENOUS at 10:33

## 2018-07-28 RX ADMIN — MIDAZOLAM 2 MG: 1 INJECTION INTRAMUSCULAR; INTRAVENOUS at 09:10

## 2018-07-28 RX ADMIN — FENTANYL CITRATE 50 MCG: 50 INJECTION, SOLUTION INTRAMUSCULAR; INTRAVENOUS at 09:50

## 2018-07-28 RX ADMIN — PROPOFOL 200 MG: 10 INJECTION, EMULSION INTRAVENOUS at 09:18

## 2018-07-28 RX ADMIN — LIDOCAINE HYDROCHLORIDE 100 MG: 20 INJECTION, SOLUTION INFILTRATION; PERINEURAL at 09:18

## 2018-07-28 RX ADMIN — FENTANYL CITRATE 50 MCG: 50 INJECTION INTRAMUSCULAR; INTRAVENOUS at 10:41

## 2018-07-28 RX ADMIN — Medication 100 MG: at 09:19

## 2018-07-28 RX ADMIN — FENTANYL CITRATE 25 MCG: 50 INJECTION INTRAMUSCULAR; INTRAVENOUS at 10:21

## 2018-07-28 RX ADMIN — DIPHENHYDRAMINE HYDROCHLORIDE 25 MG: 50 INJECTION, SOLUTION INTRAMUSCULAR; INTRAVENOUS at 11:09

## 2018-07-28 RX ADMIN — FENTANYL CITRATE 50 MCG: 50 INJECTION INTRAMUSCULAR; INTRAVENOUS at 11:08

## 2018-07-28 RX ADMIN — ONDANSETRON 4 MG: 2 INJECTION INTRAMUSCULAR; INTRAVENOUS at 09:40

## 2018-07-28 RX ADMIN — SODIUM CHLORIDE: 9 INJECTION, SOLUTION INTRAVENOUS at 09:10

## 2018-07-28 RX ADMIN — FENTANYL CITRATE 50 MCG: 50 INJECTION, SOLUTION INTRAMUSCULAR; INTRAVENOUS at 09:54

## 2018-07-28 ASSESSMENT — ENCOUNTER SYMPTOMS
NAUSEA: 0
VOMITING: 0
ABDOMINAL PAIN: 1

## 2018-07-28 ASSESSMENT — PAIN DESCRIPTION - DESCRIPTORS: DESCRIPTORS: OTHER (COMMENT)

## 2018-07-28 NOTE — PROGRESS NOTES
Second dose of 25 mg IV benadryl given. Per verbal order MDA. Pt to go back to ED on Obs status until room found in facility.

## 2018-07-28 NOTE — DISCHARGE INSTRUCTIONS
Follow-up with your outpatient providers this week.    Return to the emergency department for any problems.

## 2018-07-28 NOTE — ANESTHESIA PREPROCEDURE EVALUATION
Anesthesia Evaluation     .             ROS/MED HX    ENT/Pulmonary:     (+)ZOHRA risk factors obese, , . .    Neurologic:     (+)migraines,     Cardiovascular:  - neg cardiovascular ROS       METS/Exercise Tolerance:     Hematologic:  - neg hematologic  ROS       Musculoskeletal:  - neg musculoskeletal ROS       GI/Hepatic:     (+) Other GI/Hepatic ingested foreign body      Renal/Genitourinary:  - ROS Renal section negative       Endo:     (+) Obesity, .      Psychiatric:     (+) psychiatric history anxiety, depression and other (comment) (borderline personality)      Infectious Disease:  - neg infectious disease ROS       Malignancy:      - no malignancy   Other:    - neg other ROS               Procedure: Procedure(s):  COMBINED ESOPHAGOSCOPY, GASTROSCOPY, DUODENOSCOPY (EGD), REMOVE FOREIGN BODY - Wound Class:     HPI: Nevin Alvarado is a 27 year old female who is presenting for above stated procedure.    PMHx/PSHx/ROS:  Past Medical History:   Diagnosis Date     ADD (attention deficit disorder)      Anorexia nervosa with bulimia     history of; on Topamax     Anxiety      Borderline personality disorder      Depression      Depressive disorder      H/O self-harm      Lives in independent group home     due to debilitating mental illness     Migraine without aura     no known triggers; on Topamax bid and Imitrex PRN     Morbid obesity (H)      PTSD (post-traumatic stress disorder)      Rectal foreign body - Recurrent issue, self placed      Swallowed foreign body - Recurrent issue, self placed        Past Surgical History:   Procedure Laterality Date     ESOPHAGOSCOPY, GASTROSCOPY, DUODENOSCOPY (EGD), COMBINED N/A 3/9/2017    Procedure: COMBINED ESOPHAGOSCOPY, GASTROSCOPY, DUODENOSCOPY (EGD), REMOVE FOREIGN BODY;  Surgeon: Avis Guzmán MD;  Location: [facility] OR     ESOPHAGOSCOPY, GASTROSCOPY, DUODENOSCOPY (EGD), COMBINED N/A 4/20/2017    Procedure: COMBINED ESOPHAGOSCOPY, GASTROSCOPY, DUODENOSCOPY  (EGD), REMOVE FOREIGN BODY;  EGD removal Foregin body;  Surgeon: Lokesh Paula MD;  Location: [facility]     ESOPHAGOSCOPY, GASTROSCOPY, DUODENOSCOPY (EGD), COMBINED N/A 6/12/2017    Procedure: COMBINED ESOPHAGOSCOPY, GASTROSCOPY, DUODENOSCOPY (EGD);  COMBINED ESOPHAGOSCOPY, GASTROSCOPY, DUODENOSCOPY (EGD) [[id number]]attempted removal of foreign body;  Surgeon: Pamela Perez MD;  Location: [facility]     ESOPHAGOSCOPY, GASTROSCOPY, DUODENOSCOPY (EGD), COMBINED N/A 6/9/2017    Procedure: COMBINED ESOPHAGOSCOPY, GASTROSCOPY, DUODENOSCOPY (EGD), REMOVE FOREIGN BODY;  Esophagoscopy, Gastroscopy, Duodenoscopy, Removal of Foreign Body;  Surgeon: Dejon Marsh MD;  Location: [facility]     ESOPHAGOSCOPY, GASTROSCOPY, DUODENOSCOPY (EGD), COMBINED N/A 1/6/2018    Procedure: COMBINED ESOPHAGOSCOPY, GASTROSCOPY, DUODENOSCOPY (EGD), REMOVE FOREIGN BODY;  COMBINED ESOPHAGOSCOPY, GASTROSCOPY, DUODENOSCOPY (EGD) [by pascal net and snare with profol sedation;  Surgeon: Feliciano Emmanuel MD;  Location:  GI     ESOPHAGOSCOPY, GASTROSCOPY, DUODENOSCOPY (EGD), COMBINED N/A 3/19/2018    Procedure: COMBINED ESOPHAGOSCOPY, GASTROSCOPY, DUODENOSCOPY (EGD), REMOVE FOREIGN BODY;   Esophagodscopy, Gastroscopy, Duodenoscopy,Foreign Body Removal;  Surgeon: Brice Guzmán MD;  Location: [facility]     ESOPHAGOSCOPY, GASTROSCOPY, DUODENOSCOPY (EGD), COMBINED N/A 4/16/2018    Procedure: COMBINED ESOPHAGOSCOPY, GASTROSCOPY, DUODENOSCOPY (EGD), REMOVE FOREIGN BODY;  Esophagogastroduodenoscopy  Foreign Body Removal X 2;  Surgeon: Royer Moise MD;  Location: [facility]     ESOPHAGOSCOPY, GASTROSCOPY, DUODENOSCOPY (EGD), COMBINED N/A 6/1/2018    Procedure: COMBINED ESOPHAGOSCOPY, GASTROSCOPY, DUODENOSCOPY (EGD), REMOVE FOREIGN BODY;  COMBINED ESOPHAGOSCOPY, GASTROSCOPY, DUODENOSCOPY with removal of foreign body, propofol sedation by anesthesia;  Surgeon: Brice Martinez MD;  Location: [facility]     ESOPHAGOSCOPY,  GASTROSCOPY, DUODENOSCOPY (EGD), COMBINED N/A 7/25/2018    Procedure: COMBINED ESOPHAGOSCOPY, GASTROSCOPY, DUODENOSCOPY (EGD), REMOVE FOREIGN BODY;;  Surgeon: Candy Castelan MD;  Location: [facility]     EXAM UNDER ANESTHESIA ANUS N/A 1/10/2017    Procedure: EXAM UNDER ANESTHESIA ANUS;  Surgeon: Annmarie Haynes MD;  Location: [facility]     EXAM UNDER ANESTHESIA RECTUM N/A 7/19/2018    Procedure: EXAM UNDER ANESTHESIA RECTUM;  EXAM UNDER ANESTHESIA, REMOVAL OF RECTAL FOREIGN BODY;  Surgeon: Annmarie Haynes MD;  Location: [facility]     HC REMOVE FECAL IMPACTION OR FB W ANESTHESIA N/A 12/18/2016    Procedure: REMOVE FECAL IMPACTION/FOREIGN BODY UNDER ANESTHESIA;  Surgeon: Soham Cano MD;  Location: [facility]     KNEE SURGERY - removed a small tissue mass from knee Right      LAPAROSCOPIC ABLATION ENDOMETRIOSIS       LAPAROTOMY EXPLORATORY N/A 1/10/2017    Procedure: LAPAROTOMY EXPLORATORY;  Surgeon: Annmarie Haynes MD;  Location: [facility] OR     lymph nodes removed from neck; benign  age 7     MAMMOPLASTY REDUCTION Bilateral      RELEASE CARPAL TUNNEL Bilateral      SIGMOIDOSCOPY FLEXIBLE N/A 1/10/2017    Procedure: SIGMOIDOSCOPY FLEXIBLE;  Surgeon: Annmarie Haynes MD;  Location: [facility]     SIGMOIDOSCOPY FLEXIBLE N/A 5/8/2018    Procedure: SIGMOIDOSCOPY FLEXIBLE;  flex sig with foreign body removal using snare and rattooth forcep;  Surgeon: Soham Cano MD;  Location: [facility]       ROS as stated above    Soc Hx:   Social History   Substance Use Topics     Smoking status: Never Smoker     Smokeless tobacco: Never Used     Alcohol use No       Allergies:   Allergies   Allergen Reactions     Penicillins Anaphylaxis     Blood-Group Specific Substance Other (See Comments)     Patient has an anti-Cw and non-specific antibodies. Blood product orders may be delayed. Draw one red top and two purple top tubes for all type/screen/crossmatch orders.     Hydrocodone Nausea and Vomiting      vomiting for days     Influenza Vaccines Other (See Comments)     Oseltamivir Hives     med stopped, PN: med stopped     Vicodin [Hydrocodone-Acetaminophen] Nausea and Vomiting     Cephalosporins Rash     Lamotrigine Rash     Possibly associated with Lamictal.      Latex Rash       Meds:     (Not in a hospital admission)    Current Outpatient Prescriptions   Medication Sig Dispense Refill     acetaminophen (TYLENOL) 325 MG tablet Take 2 tablets (650 mg) by mouth every 8 hours 10 tablet 0     Cholecalciferol (VITAMIN D3 PO) Take 2,000 Units by mouth every morning        CLONIDINE HCL PO Take 0.1 mg by mouth 2 times daily       Desvenlafaxine Succinate (PRISTIQ PO) Take 100 mg by mouth every morning        levonorgestrel (MIRENA) 20 MCG/24HR IUD 1 each (20 mcg) by Intrauterine route once for 1 dose       lurasidone (LATUDA) 20 MG TABS tablet Take 2 tablets (40 mg) by mouth every evening 12 tablet 0     MELATONIN PO Take 3 mg by mouth nightly as needed (sleep)       oxyCODONE IR (ROXICODONE) 5 MG tablet Take 1 tablet (5 mg) by mouth every 3 hours as needed for moderate to severe pain or other (pain control or improvement in physical function. Hold dose for analgesic side effects.) 5 tablet 0       Physical Exam:  VS: Temp:  [36.9  C (98.5  F)] 36.9  C (98.5  F)  Heart Rate:  [96] 96  BP: (134)/(82) 134/82  SpO2:  [100 %] 100 %   100%, Weight   Wt Readings from Last 2 Encounters:   07/27/18 112.9 kg (249 lb)   07/24/18 112.9 kg (249 lb)       Labs:    BMP:  Recent Labs   Lab Test  07/22/18   2216   NA  141   POTASSIUM  3.4   CHLORIDE  106   CO2  26   BUN  9   CR  0.55   GLC  94   KELBY  8.8     LFTs:   Recent Labs   Lab Test  07/16/18   2250   PROTTOTAL  6.9   ALBUMIN  3.5   BILITOTAL  0.2   ALKPHOS  94   AST  24   ALT  32     CBC:   Recent Labs   Lab Test  07/22/18 2216   WBC  10.2   RBC  3.80   HGB  10.9*   HCT  33.4*   MCV  88   MCH  28.7   MCHC  32.6   RDW  13.3   PLT  222     Coags:  Recent Labs   Lab Test   07/19/18   0307   01/31/15   2120   INR  1.09   < >  1.10   PTT   --    --   37    < > = values in this interval not displayed.                            Anesthesia Plan      History & Physical Review      ASA Status:  3 .    NPO Status:  > 8 hours    Plan for General and ETT with Intravenous induction. Maintenance will be Balanced.    PONV prophylaxis:  Ondansetron (or other 5HT-3) and Dexamethasone or Solumedrol       Postoperative Care  Postoperative pain management:  IV analgesics and Oral pain medications.      Consents  Anesthetic plan, risks, benefits and alternatives discussed with: .  Use of blood products discussed: No .   .

## 2018-07-28 NOTE — ANESTHESIA CARE TRANSFER NOTE
Patient: Nevin Alvarado    Procedure(s):  COMBINED ESOPHAGOSCOPY, GASTROSCOPY, DUODENOSCOPY (EGD), REMOVE FOREIGN BODY - Wound Class: II-Clean Contaminated    Diagnosis: Foreign Body  Diagnosis Additional Information: No value filed.    Anesthesia Type:   General, ETT     Note:  Airway :Face Mask  Patient transferred to:PACU  Comments: VS STABLE, AWAKE, TRANSPORTED TO PACU. REPORT RN      Vitals: (Last set prior to Anesthesia Care Transfer)    CRNA VITALS  7/28/2018 0918 - 7/28/2018 0957      7/28/2018             NIBP: 151/82    NIBP Mean: 80    Ht Rate: 89                Electronically Signed By: Maya Langston  July 28, 2018  9:57 AM

## 2018-07-28 NOTE — ED PROVIDER NOTES
"  History     Chief Complaint   Patient presents with     Swallowed Foreign Body     HPI  Nevin Alvarado is a 27 year old female with a history of borderline personality and anxiety disorders who presents after swallowing a foreign body. Of note, the patient has had several ED visits for swallowed, rectally-inserted and vaginally-inserted foreign bodies, most recently on 7/24/18 (4 days ago). At her most recent ED visit she reported swallowing a pen spring which was removed under sedation by Gastroenterology in the ED. The patient reports that she purposefully swallowed another pen spring tonight around 7:30 PM. She denies that this was an attempt to harm or kill herself. She denies suicidal ideation. She denies any nausea or vomiting, hematemesis. She complains of some left upper quadrant abdominal pain. She notes that she has been passing flatus and did have a normal bowel movement, nonbloody.  She complains of some lower abdominal pain as well and is concerned she may have a UTI.  Patient reports that she has been swallowing things frequently over the past 2 years, reports that she has had to have procedures to remove various objects \"too many times to count\" over the past couple years.  She notes that she is going to see a therapist on Monday, 2 days from now.  She last ate a sandwich around 5 PM, prior to swallowing this spring.    PAST MEDICAL HISTORY:   Past Medical History:   Diagnosis Date     ADD (attention deficit disorder)      Anorexia nervosa with bulimia     history of; on Topamax     Anxiety      Borderline personality disorder      Depression      Depressive disorder      H/O self-harm      Lives in independent group home     due to debilitating mental illness     Migraine without aura     no known triggers; on Topamax bid and Imitrex PRN     Morbid obesity (H)      PTSD (post-traumatic stress disorder)      Rectal foreign body - Recurrent issue, self placed      Swallowed foreign body - " Recurrent issue, self placed        PAST SURGICAL HISTORY:   Past Surgical History:   Procedure Laterality Date     ESOPHAGOSCOPY, GASTROSCOPY, DUODENOSCOPY (EGD), COMBINED N/A 3/9/2017    Procedure: COMBINED ESOPHAGOSCOPY, GASTROSCOPY, DUODENOSCOPY (EGD), REMOVE FOREIGN BODY;  Surgeon: Avis Guzmán MD;  [facility]: [facility]     ESOPHAGOSCOPY, GASTROSCOPY, DUODENOSCOPY (EGD), COMBINED N/A 4/20/2017    Procedure: COMBINED ESOPHAGOSCOPY, GASTROSCOPY, DUODENOSCOPY (EGD), REMOVE FOREIGN BODY;  EGD removal Foregin body;  Surgeon: Lokesh Paula MD;  [facility]     ESOPHAGOSCOPY, GASTROSCOPY, DUODENOSCOPY (EGD), COMBINED N/A 6/12/2017    Procedure: COMBINED ESOPHAGOSCOPY, GASTROSCOPY, DUODENOSCOPY (EGD);  COMBINED ESOPHAGOSCOPY, GASTROSCOPY, DUODENOSCOPY (EGD) [[id number]]attempted removal of foreign body;  Surgeon: Pamela Perez MD;  [facility]     ESOPHAGOSCOPY, GASTROSCOPY, DUODENOSCOPY (EGD), COMBINED N/A 6/9/2017    Procedure: COMBINED ESOPHAGOSCOPY, GASTROSCOPY, DUODENOSCOPY (EGD), REMOVE FOREIGN BODY;  Esophagoscopy, Gastroscopy, Duodenoscopy, Removal of Foreign Body;  Surgeon: Dejon Marsh MD;  [facility]     ESOPHAGOSCOPY, GASTROSCOPY, DUODENOSCOPY (EGD), COMBINED N/A 1/6/2018    Procedure: COMBINED ESOPHAGOSCOPY, GASTROSCOPY, DUODENOSCOPY (EGD), REMOVE FOREIGN BODY;  COMBINED ESOPHAGOSCOPY, GASTROSCOPY, DUODENOSCOPY (EGD) [by pascal net and snare with profol sedation;  Surgeon: Feliciano Emmanuel MD;  Location:  GI     ESOPHAGOSCOPY, GASTROSCOPY, DUODENOSCOPY (EGD), COMBINED N/A 3/19/2018    Procedure: COMBINED ESOPHAGOSCOPY, GASTROSCOPY, DUODENOSCOPY (EGD), REMOVE FOREIGN BODY;   Esophagodscopy, Gastroscopy, Duodenoscopy,Foreign Body Removal;  Surgeon: Brice Guzmán MD;  [facility]     ESOPHAGOSCOPY, GASTROSCOPY, DUODENOSCOPY (EGD), COMBINED N/A 4/16/2018    Procedure: COMBINED ESOPHAGOSCOPY, GASTROSCOPY, DUODENOSCOPY (EGD), REMOVE FOREIGN  BODY;  Esophagogastroduodenoscopy  Foreign Body Removal X 2;  Surgeon: Royer Moise MD;  [facility]     ESOPHAGOSCOPY, GASTROSCOPY, DUODENOSCOPY (EGD), COMBINED N/A 6/1/2018    Procedure: COMBINED ESOPHAGOSCOPY, GASTROSCOPY, DUODENOSCOPY (EGD), REMOVE FOREIGN BODY;  COMBINED ESOPHAGOSCOPY, GASTROSCOPY, DUODENOSCOPY with removal of foreign body, propofol sedation by anesthesia;  Surgeon: Brice Martinez MD;  Location: [facility]     ESOPHAGOSCOPY, GASTROSCOPY, DUODENOSCOPY (EGD), COMBINED N/A 7/25/2018    Procedure: COMBINED ESOPHAGOSCOPY, GASTROSCOPY, DUODENOSCOPY (EGD), REMOVE FOREIGN BODY;;  Surgeon: Candy Castelan MD;  Location: [facility]     EXAM UNDER ANESTHESIA ANUS N/A 1/10/2017    Procedure: EXAM UNDER ANESTHESIA ANUS;  Surgeon: Annmarie Haynes MD;  [facility]     EXAM UNDER ANESTHESIA RECTUM N/A 7/19/2018    Procedure: EXAM UNDER ANESTHESIA RECTUM;  EXAM UNDER ANESTHESIA, REMOVAL OF RECTAL FOREIGN BODY;  Surgeon: Annmarie Haynes MD;  [facility]: [facility]     HC REMOVE FECAL IMPACTION OR FB W ANESTHESIA N/A 12/18/2016    Procedure: REMOVE FECAL IMPACTION/FOREIGN BODY UNDER ANESTHESIA;  Surgeon: Soham Cano MD;  Location: [facility]     KNEE SURGERY - removed a small tissue mass from knee Right      LAPAROSCOPIC ABLATION ENDOMETRIOSIS       LAPAROTOMY EXPLORATORY N/A 1/10/2017    Procedure: LAPAROTOMY EXPLORATORY;  Surgeon: Annmarie Haynes MD;  Location: [facility] OR     lymph nodes removed from neck; benign  age 7     MAMMOPLASTY REDUCTION Bilateral      RELEASE CARPAL TUNNEL Bilateral      SIGMOIDOSCOPY FLEXIBLE N/A 1/10/2017    Procedure: SIGMOIDOSCOPY FLEXIBLE;  Surgeon: Annmarie Haynes MD;  [facility]: [facility]     SIGMOIDOSCOPY FLEXIBLE N/A 5/8/2018    Procedure: SIGMOIDOSCOPY FLEXIBLE;  flex sig with foreign body removal using snare and rattooth forcep;  Surgeon: Soham Cano MD;  Location: [facility]       FAMILY HISTORY:   Family History    Problem Relation Age of Onset     Type 2 Diabetes Maternal Grandmother      Type 2 Diabetes Paternal Grandmother      Breast Cancer Paternal Grandmother      Cerebrovascular Disease Father 53     Myocardial Infarction No family hx of      Coronary Artery Disease Early Onset No family hx of      Ovarian Cancer No family hx of      Colon Cancer No family hx of        SOCIAL HISTORY:   Social History   Substance Use Topics     Smoking status: Never Smoker     Smokeless tobacco: Never Used     Alcohol use No     No current facility-administered medications for this encounter.      Current Outpatient Prescriptions   Medication     acetaminophen (TYLENOL) 325 MG tablet     Cholecalciferol (VITAMIN D3 PO)     CLONIDINE HCL PO     Desvenlafaxine Succinate (PRISTIQ PO)     levonorgestrel (MIRENA) 20 MCG/24HR IUD     lurasidone (LATUDA) 20 MG TABS tablet     MELATONIN PO     oxyCODONE IR (ROXICODONE) 5 MG tablet        Allergies   Allergen Reactions     Penicillins Anaphylaxis     Blood-Group Specific Substance Other (See Comments)     Patient has an anti-Cw and non-specific antibodies. Blood product orders may be delayed. Draw one red top and two purple top tubes for all type/screen/crossmatch orders.     Hydrocodone Nausea and Vomiting     vomiting for days     Influenza Vaccines Other (See Comments)     Oseltamivir Hives     med stopped, PN: med stopped     Vicodin [Hydrocodone-Acetaminophen] Nausea and Vomiting     Cephalosporins Rash     Lamotrigine Rash     Possibly associated with Lamictal.      Latex Rash        I have reviewed the Medications, Allergies, Past Medical and Surgical History, and Social History in the Epic system.    Review of Systems   Gastrointestinal: Positive for abdominal pain (LUQ, lower). Negative for nausea and vomiting.   All other systems reviewed and are negative.      Physical Exam   BP: 134/82  Heart Rate: 96  Temp: 98.5  F (36.9  C)  Weight: 112.9 kg (249 lb)  SpO2: 100 %      Physical  Exam   General: awake, alert, NAD  Head: normal cephalic  HEENT: pupils equal, conjugate gaze in tact  Neck: Supple  CV: regular rate and rhythm without murmur  Lungs: clear to ascultation  Abd: soft, non-tender, no guarding, no peritoneal signs  EXT: lower extremities without swelling or edema  Neuro: awake, answers questions appropriately. No focal deficits noted  Psych: Flattened affect.  Makes good eye contact.  Denies suicidal ideation        ED Course     ED Course     Procedures     12:26 AM  The patient was seen and examined by Dr. Hinojosa in Room 9.           Labs Ordered and Resulted from Time of ED Arrival Up to the Time of Departure from the ED - No data to display         Assessments & Plan (with Medical Decision Making)   Nevin is a 27-year-old female who presents after swallowing a pen spring.  This was intentional though denies desire to hurt or kill herself.  Patient has done this several times in the past previous.  She has a benign abdomen, stable vital signs, and nontoxic-appearing.      Abdominal x-ray confirms spring in the stomach, no evidence of free air.    Patient is also concerned she might have a urinary tract infection.  UA is pending at this time.    GI was consulted.  Due to several emergent cases the OR is booked so plan is to take the patient to the OR at 7 AM for endoscopic removal of the spring.  Patient will remain n.p.o. in the interim.    Patient will be signed out to Dr. Pryor.  She will follow-up on her UA.    I have reviewed the nursing notes.    I have reviewed the findings, diagnosis, plan and need for follow up with the patient.    New Prescriptions    No medications on file       Final diagnoses:   Swallowed foreign body, initial encounter     IJosue, am serving as a trained medical scribe to document services personally performed by Marco Antonio Hinojosa MD, based on the provider's statements to me.   I, Marco Antonio Hinojosa MD, was physically present and have reviewed  and verified the accuracy of this note documented by Josue Marcelo.     7/27/2018   [facility], [place], EMERGENCY DEPARTMENT     Marco Antonio Hinojosa MD  07/28/18 0154

## 2018-07-28 NOTE — PROGRESS NOTES
Pt seen in the ED this morning d/t OR staffing, still awaiting transfer to PACU/OR for scheduled EGD @745 for blunt FB removal (metallic coiled spring). Well-known to GI service given longstanding history of chronic FB ingestions/insertions at several local-area institutions, see Epic for details of procedures done at [facility]. Last EGD 7/25/18 at [facility] for similar object, procedure findings/images reviewed.    Currently AFVSS, anxious-appearing, mild LUQ tenderness/discomfort. O/w stable w/ benign exam.    AXR 7/28/18 reviewed personally, similar morphology/size blunt coiled spring noted in stomach. Given object length, unlikely to pass spontaneously beyond proximal duodenum and requires urgent removal.    Plan for EGD in OR under GETA for FB removal this morning when OR available, informed consent obtained by GI Fellow (Ralph) earlier this morning. Awaiting OR briefing.    Brice Guzmán MD   of Medicine  [facility] - Department of Medicine  Division of Gastroenterology    ADDENDUM 0836: Updated ED MD (Konstantin) re: OR delay, noted that the patient was in a more distant room w/o a sitter at time of my visit. Advised 1:1 sitter w/ close monitoring if possible given risk for additional ingestions in the interim that may further complicate the imminent procedure.    Will be available for procedure once OR ready.    Brice Guzmán MD   of Medicine  [facility] - Department of Medicine  Division of Gastroenterology

## 2018-07-28 NOTE — ED AVS SNAPSHOT
[facility], [place], Emergency Department    [address] [place] [address]    Phone:  [phone]                                       Nevin Alvarado   MRN: [id number]    Department:  [facility], Emergency Department   Date of Visit:  7/27/2018           After Visit Summary Signature Page     I have received my discharge instructions, and my questions have been answered. I have discussed any challenges I see with this plan with the nurse or doctor.    ..........................................................................................................................................  Patient/Patient Representative Signature      ..........................................................................................................................................  Patient Representative Print Name and Relationship to Patient    ..................................................               ................................................  Date                                            Time    ..........................................................................................................................................  Reviewed by Signature/Title    ...................................................              ..............................................  Date                                                            Time

## 2018-07-28 NOTE — ANESTHESIA POSTPROCEDURE EVALUATION
Patient: Nevin Alvarado    Procedure(s):  COMBINED ESOPHAGOSCOPY, GASTROSCOPY, DUODENOSCOPY (EGD), REMOVE FOREIGN BODY - Wound Class: II-Clean Contaminated    Diagnosis:Foreign Body  Diagnosis Additional Information: No value filed.    Anesthesia Type:  General, ETT    Note:  Anesthesia Post Evaluation    Patient location during evaluation: PACU  Patient participation: Able to fully participate in evaluation  Level of consciousness: awake  Pain management: adequate  Airway patency: patent  Cardiovascular status: acceptable  Respiratory status: acceptable  Hydration status: acceptable  PONV: none     Anesthetic complications: None          Last vitals:  Vitals:    07/28/18 0950 07/28/18 1000 07/28/18 1015   BP: 151/82 128/71 122/62   Pulse: 90     Resp: 16 16 15   Temp: 36.9  C (98.4  F)     SpO2: 99% 99% 98%         Electronically Signed By: Fausto Mendoza MD  July 28, 2018  10:26 AM

## 2018-07-28 NOTE — PROGRESS NOTES
Emergency Social Work Services Note    Date of  Intervention: 07/28/18  Last Emergency Department Visit:  07/24, 07/22, 07/18 at [facility] ED, 07/16 [facility], 07/15 [facility], 07/13 & 07/12 [facility], 07/06 [facility]. 07/04 [facility], 07/03 [facility] and [facility]. 9 ED visits in June.   Care Plan:  none  Collaborated with:  Chart review, ED MD, ED RN    Data:  Nevin Alvarado is a 27 year old  female who ingested a foreign body (spring from pen). Nevin has a hx of borderline personality d/o, anxiety and recurrent foreign body ingestions. Per documentation, she has not expressed suicidal intent but rather has poor impulse control. Today, ED SW consulted to arrange safe discharge transportation home.     Intervention:  ED SW met with Nevin to confirm discharge plan.She is aware and in agreement. Confirmed home address and that she has keys to home. Arranged [facility] transportation for safety and to ensure she gets home.     Assessment:  transportation    Plan:    Anticipated Disposition:  Home with services    Barriers to d/c plan:  none    Follow Up:  Nevin will follow-up with outpatient providers. Per chart review, she is followed by [facility] ,  worker, [facility], outpatient psychiatry through [facility].     YORDAN Elkins, [facility]  Social Work Services, Emergency Dept [facility]  Pager: [phone] Mon-Sat 9 am - 9 pm, on-call/after hours pager [phone]

## 2018-07-28 NOTE — BRIEF OP NOTE
Gastroenterology Endoscopy Suite Brief Operative Note    Procedure:  Upper endoscopy with foreign body removal   Post-operative diagnosis:  Foreign body removal from stomach - spring from pen. Small non-bleeding erosion in fundus likely 2/2 trauma from foreign body   Staff Physician:  Dr. Brice Guzmán   Fellow/Assistant(s):  Brice Rosas    Specimens:  Please see final procedure note for further details.   Findings:  Foreign body (spring) identified and removed. Small non-bleeding erosion in atrum likely 2/2 trauma from foreign body   Complications:  None.   Condition:  Stable   Recommendations  Diet:  Clear liquid diet  PPI:  PPI po once daily for 4 weeks   Anti-coagulants/platelets:  N/A  Octreotide:  N/A  Discharge Planning:   -  Use a PPI po once daily for 4 weeks  - 1:1 sitter for monitoring to avoid recurrent ingestion prior to discharge  - AXR to confirm no remaining foreign body          Update:   Follow-up AXR clear with no remaining foreign body    Discussed results with patient. Mild continued abdominal pain post-procedure, stable from prior. Will dc back to ED for disposition.

## 2018-07-28 NOTE — ED TRIAGE NOTES
Pt presents to ED with c/o swallowing a pen spring. States her abdomen feels uncomfortable, but airway is intact. Pt denies thoughts of self harm or suicidal ideation. Pt also states she thinks she might have a UTI with reports of lower abdominal pain.

## 2018-07-28 NOTE — ED AVS SNAPSHOT
[facility], Emergency Department    [address] [address]    Phone:  [phone]                                       Nevin Alvarado   MRN: [id number]    Department:  [facility], Emergency Department   Date of Visit:  7/27/2018           Patient Information     Date Of Birth          1990        Your diagnoses for this visit were:     Swallowed foreign body, initial encounter        You were seen by Marco Antonio Hinojosa MD and Cornelio Basurto MD.        Discharge Instructions       Follow-up with your outpatient providers this week.    Return to the emergency department for any problems.    24 Hour Appointment Hotline       To make an appointment at any [facility] clinic, call [phone] ([phone]). If you don't have a family doctor or clinic, we will help you find one. [facility] clinics are conveniently located to serve the needs of you and your family.             Review of your medicines      Our records show that you are taking the medicines listed below. If these are incorrect, please call your family doctor or clinic.        Dose / Directions Last dose taken    acetaminophen 325 MG tablet   Commonly known as:  TYLENOL   Dose:  650 mg   Quantity:  10 tablet        Take 2 tablets (650 mg) by mouth every 8 hours   Refills:  0        CLONIDINE HCL PO   Dose:  0.1 mg        Take 0.1 mg by mouth 2 times daily   Refills:  0        levonorgestrel 20 MCG/24HR IUD   Commonly known as:  MIRENA   Dose:  1 each        1 each (20 mcg) by Intrauterine route once for 1 dose   Refills:  0        lurasidone 20 MG Tabs tablet   Commonly known as:  LATUDA   Dose:  40 mg   Quantity:  12 tablet        Take 2 tablets (40 mg) by mouth every evening   Refills:  0        MELATONIN PO   Dose:  3 mg        Take 3 mg by mouth nightly as needed (sleep)   Refills:  0        oxyCODONE IR 5 MG tablet   Commonly known as:  ROXICODONE   Dose:  5 mg   Quantity:  5 tablet        Take 1 tablet  (5 mg) by mouth every 3 hours as needed for moderate to severe pain or other (pain control or improvement in physical function. Hold dose for analgesic side effects.)   Refills:  0        PRISTIQ PO   Dose:  100 mg        Take 100 mg by mouth every morning   Refills:  0        VITAMIN D3 PO   Dose:  2000 Units        Take 2,000 Units by mouth every morning   Refills:  0                Procedures and tests performed during your visit     Transfer patient    UA with Microscopic    UPPER GI ENDOSCOPY    Urine Culture    XR Abdomen 2 Views    XR Abdomen Port 1 View    hCG qual urine POCT      Orders Needing Specimen Collection     None      Pending Results     Date and Time Order Name Status Description    7/28/2018 0027 Urine Culture Preliminary             Pending Culture Results     Date and Time Order Name Status Description    7/28/2018 0027 Urine Culture Preliminary             Pending Results Instructions     If you had any lab results that were not finalized at the time of your Discharge, you can call the ED Lab Result RN at [phone]. You will be contacted by this team for any positive Lab results or changes in treatment. The nurses are available 7 days a week from 10A to 6:30P.  You can leave a message 24 hours per day and they will return your call.        Thank you for choosing [facility]       Thank you for choosing [facility] for your care. Our goal is always to provide you with excellent care. Hearing back from our patients is one way we can continue to improve our services. Please take a few minutes to complete the written survey that you may receive in the mail after you visit with us. Thank you!        [facility] Information     [employer] gives you secure access to your electronic health record. If you see a primary care provider, you can also send messages to your care team and make appointments. If you have questions, please call your primary care clinic.  If you do not have a primary care provider, please  call [phone] and they will assist you.        Care EveryWhere ID     This is your Care EveryWhere ID. This could be used by other organizations to access your [facility] medical records  [id number]        Equal Access to Services     ZENON DIAMOND : Gabriel Collado, watay singh, qamanuelta kaalmagregory tenorio, mic persaud. So [facility] [phone].    ATENCIÓN: Si habla español, tiene a singh disposición servicios gratuitos de asistencia lingüística. Llame al [phone].    We comply with applicable federal civil rights laws and [place] laws. We do not discriminate on the basis of race, color, national origin, age, disability, sex, sexual orientation, or gender identity.            After Visit Summary       This is your record. Keep this with you and show to your community pharmacist(s) and doctor(s) at your next visit.

## 2018-07-28 NOTE — H&P
GASTROENTEROLOGY CONSULTATION      Date of Admission:  7/28/2018           Reason for Consultation:   We were asked by Dr. Hinojosa of emergency medicine to evaluate this patient with foreign body ingestion           ASSESSMENT AND RECOMMENDATIONS:   Assessment:  27 year old female with a history of borderline personality disorder, anxiety, recurrent foreign body ingestions who GI was consulted on for foreign body ingestion of spring from pen, appears to be in stomach on AXR. Given length (8 cm) unlikely to pass, thus will plan on endoscopic removal in the OR under general anesthesia.      Recommendations  - NPO  - Consent obtained, plan for procedure this AM, scheduled at 7:45  - Any expedition of getting her into a more structured living environment (planned for August) should be explored if possible to prevent future episodes     GI will continue to follow    Thank you for involving us in this patient's care. Please do not hesitate to contact the GI service with any questions or concerns.     Pt care plan discussed with Dr. Guzmán, GI staff physician.    Brice Rosas MD  GI Fellow  P: [phone]    ATTENDING ATTESTATION:    REFERRING PROVIDER: Micaela  DATE SEEN: 7/28/18    Patient was discussed, seen, and examined by me, Brice Guzmán. The plan of care and pertinent data/imaging were also reviewed with the GI Consult team and GI Fellow as well. Agree with the joint assessment and plan as delineated above.    Please contact me with any further questions.    Brice Guzmán MD    [facility] - Department of Medicine  Division of Gastroenterology  [phone]    -------------------------------------------------------------------------------------------------------------------    History of Present Illness   Nevin Alvarado is a 27 year old female with a history of borderline personality disorder, anxiety, recurrent foreign body ingestions who GI was consulted on for  foreign body ingestion. At 7pm on 7/27 she ingested a spring from a pen. Denies other ingestions other than single spring.  Recently at [facility] 7/25 for similar episode. Last meal was at 5 pm. Had initial mild epigastric abdominal pain that has been worse in the LUQ since 0400, exacerbated by movement. No N/V, hematemesis, melena, hematochezia. She moved into an apartment by herself in November and since then has been having more issues with foreign body ingestion. Given this planning to move into more structured living situation with help in [place] next month, which she is looking forward to.    Past Medical History    Reviewed and edited as appropriate  Past Medical History:   Diagnosis Date     ADD (attention deficit disorder)      Anorexia nervosa with bulimia     history of; on Topamax     Anxiety      Borderline personality disorder      Depression      Depressive disorder      H/O self-harm      Lives in independent group home     due to debilitating mental illness     Migraine without aura     no known triggers; on Topamax bid and Imitrex PRN     Morbid obesity (H)      PTSD (post-traumatic stress disorder)      Rectal foreign body - Recurrent issue, self placed      Swallowed foreign body - Recurrent issue, self placed        Past Surgical History   Reviewed and edited as appropriate   Past Surgical History:   Procedure Laterality Date     ESOPHAGOSCOPY, GASTROSCOPY, DUODENOSCOPY (EGD), COMBINED N/A 3/9/2017    Procedure: COMBINED ESOPHAGOSCOPY, GASTROSCOPY, DUODENOSCOPY (EGD), REMOVE FOREIGN BODY;  Surgeon: Avis Guzmán MD;  Location:  OR     ESOPHAGOSCOPY, GASTROSCOPY, DUODENOSCOPY (EGD), COMBINED N/A 4/20/2017    Procedure: COMBINED ESOPHAGOSCOPY, GASTROSCOPY, DUODENOSCOPY (EGD), REMOVE FOREIGN BODY;  EGD removal Foregin body;  Surgeon: Lokesh Paula MD;  Location: [facility] OR     ESOPHAGOSCOPY, GASTROSCOPY, DUODENOSCOPY (EGD), COMBINED N/A 6/12/2017    Procedure: COMBINED  ESOPHAGOSCOPY, GASTROSCOPY, DUODENOSCOPY (EGD);  COMBINED ESOPHAGOSCOPY, GASTROSCOPY, DUODENOSCOPY (EGD) [[id number]]attempted removal of foreign body;  Surgeon: Pamela Perez MD;  [facility]     ESOPHAGOSCOPY, GASTROSCOPY, DUODENOSCOPY (EGD), COMBINED N/A 6/9/2017    Procedure: COMBINED ESOPHAGOSCOPY, GASTROSCOPY, DUODENOSCOPY (EGD), REMOVE FOREIGN BODY;  Esophagoscopy, Gastroscopy, Duodenoscopy, Removal of Foreign Body;  Surgeon: Dejon Marsh MD;  [facility]     ESOPHAGOSCOPY, GASTROSCOPY, DUODENOSCOPY (EGD), COMBINED N/A 1/6/2018    Procedure: COMBINED ESOPHAGOSCOPY, GASTROSCOPY, DUODENOSCOPY (EGD), REMOVE FOREIGN BODY;  COMBINED ESOPHAGOSCOPY, GASTROSCOPY, DUODENOSCOPY (EGD) [by pascal net and snare with profol sedation;  Surgeon: Feliciano Emmanuel MD;  Location:  GI     ESOPHAGOSCOPY, GASTROSCOPY, DUODENOSCOPY (EGD), COMBINED N/A 3/19/2018    Procedure: COMBINED ESOPHAGOSCOPY, GASTROSCOPY, DUODENOSCOPY (EGD), REMOVE FOREIGN BODY;   Esophagodscopy, Gastroscopy, Duodenoscopy,Foreign Body Removal;  Surgeon: Brice Guzmán MD;  [facility]     ESOPHAGOSCOPY, GASTROSCOPY, DUODENOSCOPY (EGD), COMBINED N/A 4/16/2018    Procedure: COMBINED ESOPHAGOSCOPY, GASTROSCOPY, DUODENOSCOPY (EGD), REMOVE FOREIGN BODY;  Esophagogastroduodenoscopy  Foreign Body Removal X 2;  Surgeon: Royer Moise MD;  [facility]     ESOPHAGOSCOPY, GASTROSCOPY, DUODENOSCOPY (EGD), COMBINED N/A 6/1/2018    Procedure: COMBINED ESOPHAGOSCOPY, GASTROSCOPY, DUODENOSCOPY (EGD), REMOVE FOREIGN BODY;  COMBINED ESOPHAGOSCOPY, GASTROSCOPY, DUODENOSCOPY with removal of foreign body, propofol sedation by anesthesia;  Surgeon: Brice Martinez MD;  Location:  GI     ESOPHAGOSCOPY, GASTROSCOPY, DUODENOSCOPY (EGD), COMBINED N/A 7/25/2018    Procedure: COMBINED ESOPHAGOSCOPY, GASTROSCOPY, DUODENOSCOPY (EGD), REMOVE FOREIGN BODY;;  Surgeon: Candy Castelan MD;  Location:  GI     EXAM  UNDER ANESTHESIA ANUS N/A 1/10/2017    Procedure: EXAM UNDER ANESTHESIA ANUS;  Surgeon: Annmarie Haynes MD;  [facility]     EXAM UNDER ANESTHESIA RECTUM N/A 7/19/2018    Procedure: EXAM UNDER ANESTHESIA RECTUM;  EXAM UNDER ANESTHESIA, REMOVAL OF RECTAL FOREIGN BODY;  Surgeon: Annmarie Haynes MD;  [facility]     HC REMOVE FECAL IMPACTION OR FB W ANESTHESIA N/A 12/18/2016    Procedure: REMOVE FECAL IMPACTION/FOREIGN BODY UNDER ANESTHESIA;  Surgeon: Soham Cano MD;  Location: [facility] OR     KNEE SURGERY - removed a small tissue mass from knee Right      LAPAROSCOPIC ABLATION ENDOMETRIOSIS       LAPAROTOMY EXPLORATORY N/A 1/10/2017    Procedure: LAPAROTOMY EXPLORATORY;  Surgeon: Annmarie Haynes MD;  [facility]     lymph nodes removed from neck; benign  age 7     MAMMOPLASTY REDUCTION Bilateral      RELEASE CARPAL TUNNEL Bilateral      SIGMOIDOSCOPY FLEXIBLE N/A 1/10/2017    Procedure: SIGMOIDOSCOPY FLEXIBLE;  Surgeon: Annmarie Haynes MD;  [facility]     SIGMOIDOSCOPY FLEXIBLE N/A 5/8/2018    Procedure: SIGMOIDOSCOPY FLEXIBLE;  flex sig with foreign body removal using snare and rattooth forcep;  Surgeon: Soham Cano MD;  Location:  GI       Social History   Reviewed and edited as appropriate  Social History     Social History     Marital status: Single     Spouse name: N/A     Number of children: N/A     Years of education: N/A     Occupational History     On disability      Social History Main Topics     Smoking status: Never Smoker     Smokeless tobacco: Never Used     Alcohol use No     Drug use: No     Sexual activity: Yes     Partners: Male     Birth control/ protection: IUD      Comment: IUD - Mirena - placed July, 2015     Other Topics Concern     Not on file     Social History Narrative    Single.    Living in independent living portion of [facility].    On disability.    No regular exercise.        Family History     Reviewed and  edited as appropriate  Family History   Problem Relation Age of Onset     Type 2 Diabetes Maternal Grandmother      Type 2 Diabetes Paternal Grandmother      Breast Cancer Paternal Grandmother      Cerebrovascular Disease Father 53     Myocardial Infarction No family hx of      Coronary Artery Disease Early Onset No family hx of      Ovarian Cancer No family hx of      Colon Cancer No family hx of      Allergies   Reviewed and edited as appropriate     Allergies   Allergen Reactions     Penicillins Anaphylaxis     Blood-Group Specific Substance Other (See Comments)     Patient has an anti-Cw and non-specific antibodies. Blood product orders may be delayed. Draw one red top and two purple top tubes for all type/screen/crossmatch orders.     Hydrocodone Nausea and Vomiting     vomiting for days     Influenza Vaccines Other (See Comments)     Oseltamivir Hives     med stopped, PN: med stopped     Vicodin [Hydrocodone-Acetaminophen] Nausea and Vomiting     Cephalosporins Rash     Lamotrigine Rash     Possibly associated with Lamictal.      Latex Rash        Prior to Admission Medications      (Not in a hospital admission)   No current facility-administered medications for this encounter.      Current Outpatient Prescriptions   Medication Sig     acetaminophen (TYLENOL) 325 MG tablet Take 2 tablets (650 mg) by mouth every 8 hours     Cholecalciferol (VITAMIN D3 PO) Take 2,000 Units by mouth every morning      CLONIDINE HCL PO Take 0.1 mg by mouth 2 times daily     Desvenlafaxine Succinate (PRISTIQ PO) Take 100 mg by mouth every morning      levonorgestrel (MIRENA) 20 MCG/24HR IUD 1 each (20 mcg) by Intrauterine route once for 1 dose     lurasidone (LATUDA) 20 MG TABS tablet Take 2 tablets (40 mg) by mouth every evening     MELATONIN PO Take 3 mg by mouth nightly as needed (sleep)     oxyCODONE IR (ROXICODONE) 5 MG tablet Take 1 tablet (5 mg) by mouth every 3 hours as needed for moderate to severe pain or other (pain  control or improvement in physical function. Hold dose for analgesic side effects.)        Review of Systems   A complete review of systems was performed and is negative except as noted in the HPI      Physical Exam   /82  Temp 98.5  F (36.9  C) (Oral)  Wt 112.9 kg (249 lb)  SpO2 100%  BMI 45.54 kg/m2  Wt:   Wt Readings from Last 2 Encounters:   07/27/18 112.9 kg (249 lb)   07/24/18 112.9 kg (249 lb)      Constitutional: cooperative, pleasant, not dyspneic/diaphoretic, no acute distress  Eyes: Sclera anicteric/injected   Ears/nose/mouth/throat: Normal oropharynx without ulcers or exudate, mucus membranes moist  Neck: supple  CV: No edema  Respiratory: Unlabored breathing  Abd: Nondistended, +bs, no hepatosplenomegaly, +tender in LUQ, no peritoneal signs  Skin: warm, perfused, no jaundice  Neuro: AAO x 3  Psych: Normal affect  MSK: No gross deformities    Data   Labs and imaging below were independently reviewed and interpreted    BMP  Recent Labs  Lab 07/22/18  2216      POTASSIUM 3.4   CHLORIDE 106   KELBY 8.8   CO2 26   BUN 9   CR 0.55   GLC 94     CBC  Recent Labs  Lab 07/22/18  2216   WBC 10.2   RBC 3.80   HGB 10.9*   HCT 33.4*   MCV 88   MCH 28.7   MCHC 32.6   RDW 13.3        Imaging:  AXR:  IMPRESSION: Redemonstration of 8 cm metallic spring projecting over  mid abdomen just left of the midline. Nonobstructive bowel gas  pattern.

## 2018-07-29 LAB
BACTERIA SPEC CULT: NORMAL
Lab: NORMAL
SPECIMEN SOURCE: NORMAL

## 2018-07-30 ENCOUNTER — HOSPITAL ENCOUNTER (OUTPATIENT)
Facility: CLINIC | Age: 28
Setting detail: OBSERVATION
Discharge: HOME | End: 2018-07-31
Attending: EMERGENCY MEDICINE | Admitting: EMERGENCY MEDICINE
Payer: MEDICARE

## 2018-07-30 ENCOUNTER — APPOINTMENT (OUTPATIENT)
Dept: GENERAL RADIOLOGY | Facility: CLINIC | Age: 28
End: 2018-07-30
Attending: EMERGENCY MEDICINE
Payer: MEDICARE

## 2018-07-30 ENCOUNTER — PATIENT OUTREACH (OUTPATIENT)
Dept: CARE COORDINATION | Facility: CLINIC | Age: 28
End: 2018-07-30

## 2018-07-30 DIAGNOSIS — T18.9XXA SWALLOWED FOREIGN BODY, INITIAL ENCOUNTER: ICD-10-CM

## 2018-07-30 PROCEDURE — 96374 THER/PROPH/DIAG INJ IV PUSH: CPT

## 2018-07-30 PROCEDURE — 99285 EMERGENCY DEPT VISIT HI MDM: CPT | Mod: 25

## 2018-07-30 PROCEDURE — 99285 EMERGENCY DEPT VISIT HI MDM: CPT | Mod: Z6 | Performed by: EMERGENCY MEDICINE

## 2018-07-30 PROCEDURE — 74018 RADEX ABDOMEN 1 VIEW: CPT

## 2018-07-30 NOTE — ED TRIAGE NOTES
"Nevin comes in after swallowing a spring from a pen. She did this at 1730 because she tells me, \"The compulsions are getting stronger.\" She last did this on July 27th and \"they had to do an endoscopy on me in order to get it out.\" She has no pain or airway issues or other symptoms from this event. She denies suicidal/homicidal thoughts but has hurt herself in the past. She has agreed to check back in with the triage nurse in 30 minutes to see if she is still feeling safe.  "

## 2018-07-30 NOTE — IP AVS SNAPSHOT
Unit 6D Observation [address] [address]    Phone:  [phone]    Fax:  [phone]                                       After Visit Summary   7/30/2018    Nevin Alvarado    MRN: [id number]           After Visit Summary Signature Page     I have received my discharge instructions, and my questions have been answered. I have discussed any challenges I see with this plan with the nurse or doctor.    ..........................................................................................................................................  Patient/Patient Representative Signature      ..........................................................................................................................................  Patient Representative Print Name and Relationship to Patient    ..................................................               ................................................  Date                                            Time    ..........................................................................................................................................  Reviewed by Signature/Title    ...................................................              ..............................................  Date                                                            Time

## 2018-07-30 NOTE — IP AVS SNAPSHOT
MRN:[id number]                      After Visit Summary   7/30/2018    Nevin Alvarado    MRN: [id number]           Thank you!     Thank you for choosing [facility] for your care. Our goal is always to provide you with excellent care. Hearing back from our patients is one way we can continue to improve our services. Please take a few minutes to complete the written survey that you may receive in the mail after you visit with us. Thank you!        Patient Information     Date Of Birth          1990        About your hospital stay     You were admitted on:  July 31, 2018 You last received care in the:  Unit 6D Observation [facility]    You were discharged on:  July 31, 2018        Reason for your hospital stay       Swallowed foreign body, GI removed foreign body, you were able to eat without difficulty, discharged to home.                  Who to Call     For medical emergencies, please call 911.  For non-urgent questions about your medical care, please call your primary care provider or clinic, [phone]  For questions related to your surgery, please call your surgery clinic        Attending Provider     Provider Florin Wall MD Emergency Medicine    Estrada Bee MD Emergency Medicine       Primary Care Provider Office Phone # Fax #    Latonya Knight [phone]       When to contact your care team       Please avoid ingesting foreign bodies.  If you do please return to the ED.  Return to the ED for severe abdominal pain, vomiting of blood, black stools.                  After Care Instructions     Activity       Your activity upon discharge: activity as tolerated and no driving for today            Diet       Follow this diet upon discharge: Orders Placed This Encounter      Regular Diet Adult                  Follow-up Appointments     Adult [facility] Follow-up and recommended labs and tests       Follow up with primary care  "provider, Latonya Knight, within 7 days for hospital follow- up.      Appointments on [place] and/or [facility] (with [facility] or [facility] provider or service). Call [phone] if you haven't heard regarding these appointments within 7 days of discharge.                  Pending Results     No orders found for last 3 day(s).            Statement of Approval     Ordered          07/31/18 1234  I have reviewed and agree with all the recommendations and orders detailed in this document.  EFFECTIVE NOW     Approved and electronically signed by:  Liza Gold APRN CNP             Admission Information     Date & Time Department Dept. Phone    7/30/2018 Unit 6D Observation [facility] [place] [phone]      Your Vitals Were     Blood Pressure Pulse Temperature Respirations Height Weight    127/76 (BP Location: Left arm) 82 98.1  F (36.7  C) (Oral) 18 1.575 m (5' 2\") 112.9 kg (249 lb)    Pulse Oximetry BMI (Body Mass Index)                95% 45.54 kg/m2          [employer] Information     [employer] gives you secure access to your electronic health record. If you see a primary care provider, you can also send messages to your care team and make appointments. If you have questions, please call your primary care clinic.  If you do not have a primary care provider, please call [phone] and they will assist you.        Care EveryWhere ID     This is your Care EveryWhere ID. This could be used by other organizations to access your [facility] medical records  [id number]        Equal Access to Services     ZENON DIAMOND AH: Hadii zaire Collado, waaxda lukalebadaha, qaybta kaalmada jona, mic persaud. So [facility] [phone].    ATENCIÓN: Si habla español, tiene a singh disposición servicios gratuitos de asistencia lingüística. Llame al [phone].    We comply with applicable federal civil rights laws and [place] laws. We do not discriminate on the basis of race, color, national origin, " age, disability, sex, sexual orientation, or gender identity.               Review of your medicines      START taking        Dose / Directions    omeprazole 40 MG capsule   Commonly known as:  priLOSEC   Used for:  Swallowed foreign body, initial encounter        Dose:  40 mg   Take 1 capsule (40 mg) by mouth daily Take 30-60 minutes before a meal.   Quantity:  30 capsule   Refills:  0         CONTINUE these medicines which have NOT CHANGED        Dose / Directions    acetaminophen 325 MG tablet   Commonly known as:  TYLENOL   Used for:  Rectal foreign body, initial encounter        Dose:  650 mg   Take 2 tablets (650 mg) by mouth every 8 hours   Quantity:  10 tablet   Refills:  0       BENADRYL PO        Dose:  25-50 mg   Take 25-50 mg by mouth nightly as needed   Refills:  0       CLONIDINE HCL PO        Dose:  0.05 mg   Take 0.05 mg by mouth 2 times daily   Refills:  0       levonorgestrel 20 MCG/24HR IUD   Commonly known as:  MIRENA        Dose:  1 each   1 each (20 mcg) by Intrauterine route once for 1 dose   Refills:  0       lurasidone 20 MG Tabs tablet   Commonly known as:  LATUDA   Used for:  Borderline personality disorder, Severe episode of recurrent major depressive disorder, without psychotic features (H)        Dose:  40 mg   Take 2 tablets (40 mg) by mouth every evening   Quantity:  12 tablet   Refills:  0       MELATONIN PO        Dose:  3 mg   Take 3 mg by mouth nightly as needed (sleep)   Refills:  0       PRISTIQ PO        Dose:  100 mg   Take 100 mg by mouth every morning   Refills:  0       VITAMIN D3 PO        Dose:  2000 Units   Take 2,000 Units by mouth every morning   Refills:  0            Where to get your medicines      These medications were sent to [facility] [place] [facility] - [place], [place] - [address], [place] [address]     Phone:  [phone]     omeprazole 40 MG capsule                Protect others around you: Learn how to safely use, store and throw  away your medicines at www.disposemymeds.org.             Medication List: This is a list of all your medications and when to take them. Check marks below indicate your daily home schedule. Keep this list as a reference.      Medications           Morning Afternoon Evening Bedtime As Needed    acetaminophen 325 MG tablet   Commonly known as:  TYLENOL   Take 2 tablets (650 mg) by mouth every 8 hours   Last time this was given:  1,000 mg on 7/31/2018 11:45 AM                                BENADRYL PO   Take 25-50 mg by mouth nightly as needed                                CLONIDINE HCL PO   Take 0.05 mg by mouth 2 times daily                                levonorgestrel 20 MCG/24HR IUD   Commonly known as:  MIRENA   1 each (20 mcg) by Intrauterine route once for 1 dose                                lurasidone 20 MG Tabs tablet   Commonly known as:  LATUDA   Take 2 tablets (40 mg) by mouth every evening                                MELATONIN PO   Take 3 mg by mouth nightly as needed (sleep)                                omeprazole 40 MG capsule   Commonly known as:  priLOSEC   Take 1 capsule (40 mg) by mouth daily Take 30-60 minutes before a meal.                                PRISTIQ PO   Take 100 mg by mouth every morning                                VITAMIN D3 PO   Take 2,000 Units by mouth every morning

## 2018-07-30 NOTE — PROGRESS NOTES
Clinic Care Coordination Contact      Situation: Patient chart reviewed by care coordinator.      Background: MH pt is managed through [facility], has MH CM, Psychiatry, therapy in place. Pt only utilizes [facility]. PCP with [facility]      Assessment: Chronic/Persistantly MI      Plan/Recommendations: Not open to [facility] services. Not a [facility] pt.       LEATHA Mayers  Care Coordinator  [phone]  [email or web address]

## 2018-07-31 ENCOUNTER — ANESTHESIA EVENT (OUTPATIENT)
Dept: SURGERY | Facility: CLINIC | Age: 28
End: 2018-07-31
Payer: MEDICARE

## 2018-07-31 ENCOUNTER — ANESTHESIA (OUTPATIENT)
Dept: SURGERY | Facility: CLINIC | Age: 28
End: 2018-07-31
Payer: MEDICARE

## 2018-07-31 ENCOUNTER — SURGERY (OUTPATIENT)
Age: 28
End: 2018-07-31

## 2018-07-31 VITALS
RESPIRATION RATE: 18 BRPM | OXYGEN SATURATION: 95 % | HEIGHT: 62 IN | BODY MASS INDEX: 45.82 KG/M2 | HEART RATE: 82 BPM | TEMPERATURE: 98.1 F | SYSTOLIC BLOOD PRESSURE: 127 MMHG | DIASTOLIC BLOOD PRESSURE: 76 MMHG | WEIGHT: 249 LBS

## 2018-07-31 LAB
ANION GAP SERPL CALCULATED.3IONS-SCNC: 9 MMOL/L (ref 3–14)
BASOPHILS # BLD AUTO: 0 10E9/L (ref 0–0.2)
BASOPHILS NFR BLD AUTO: 0.2 %
BUN SERPL-MCNC: 6 MG/DL (ref 7–30)
CALCIUM SERPL-MCNC: 9.1 MG/DL (ref 8.5–10.1)
CHLORIDE SERPL-SCNC: 103 MMOL/L (ref 94–109)
CO2 SERPL-SCNC: 27 MMOL/L (ref 20–32)
CREAT SERPL-MCNC: 0.58 MG/DL (ref 0.52–1.04)
DIFFERENTIAL METHOD BLD: ABNORMAL
EOSINOPHIL # BLD AUTO: 0.1 10E9/L (ref 0–0.7)
EOSINOPHIL NFR BLD AUTO: 0.8 %
ERYTHROCYTE [DISTWIDTH] IN BLOOD BY AUTOMATED COUNT: 13.5 % (ref 10–15)
GFR SERPL CREATININE-BSD FRML MDRD: >90 ML/MIN/1.7M2
GLUCOSE SERPL-MCNC: 94 MG/DL (ref 70–99)
HCT VFR BLD AUTO: 36.6 % (ref 35–47)
HGB BLD-MCNC: 12.1 G/DL (ref 11.7–15.7)
IMM GRANULOCYTES # BLD: 0 10E9/L (ref 0–0.4)
IMM GRANULOCYTES NFR BLD: 0.2 %
LYMPHOCYTES # BLD AUTO: 2.3 10E9/L (ref 0.8–5.3)
LYMPHOCYTES NFR BLD AUTO: 19.8 %
MAGNESIUM SERPL-MCNC: 1.8 MG/DL (ref 1.6–2.3)
MCH RBC QN AUTO: 29.1 PG (ref 26.5–33)
MCHC RBC AUTO-ENTMCNC: 33.1 G/DL (ref 31.5–36.5)
MCV RBC AUTO: 88 FL (ref 78–100)
MONOCYTES # BLD AUTO: 0.6 10E9/L (ref 0–1.3)
MONOCYTES NFR BLD AUTO: 5.1 %
NEUTROPHILS # BLD AUTO: 8.6 10E9/L (ref 1.6–8.3)
NEUTROPHILS NFR BLD AUTO: 73.9 %
NRBC # BLD AUTO: 0 10*3/UL
NRBC BLD AUTO-RTO: 0 /100
PHOSPHATE SERPL-MCNC: 3.7 MG/DL (ref 2.5–4.5)
PLATELET # BLD AUTO: 258 10E9/L (ref 150–450)
POTASSIUM SERPL-SCNC: 3.4 MMOL/L (ref 3.4–5.3)
RBC # BLD AUTO: 4.16 10E12/L (ref 3.8–5.2)
SODIUM SERPL-SCNC: 138 MMOL/L (ref 133–144)
UPPER GI ENDOSCOPY: NORMAL
WBC # BLD AUTO: 11.6 10E9/L (ref 4–11)

## 2018-07-31 PROCEDURE — G0378 HOSPITAL OBSERVATION PER HR: HCPCS

## 2018-07-31 PROCEDURE — 84100 ASSAY OF PHOSPHORUS: CPT | Performed by: EMERGENCY MEDICINE

## 2018-07-31 PROCEDURE — A9270 NON-COVERED ITEM OR SERVICE: HCPCS | Mod: GY | Performed by: PHYSICIAN ASSISTANT

## 2018-07-31 PROCEDURE — 99235 HOSP IP/OBS SAME DATE MOD 70: CPT | Mod: Z6 | Performed by: PHYSICIAN ASSISTANT

## 2018-07-31 PROCEDURE — 25000132 ZZH RX MED GY IP 250 OP 250 PS 637: Mod: GY | Performed by: PHYSICIAN ASSISTANT

## 2018-07-31 PROCEDURE — 25000125 ZZHC RX 250: Performed by: NURSE ANESTHETIST, CERTIFIED REGISTERED

## 2018-07-31 PROCEDURE — 40000170 ZZH STATISTIC PRE-PROCEDURE ASSESSMENT II: Performed by: INTERNAL MEDICINE

## 2018-07-31 PROCEDURE — 71000016 ZZH RECOVERY PHASE 1 LEVEL 3 FIRST HR: Performed by: INTERNAL MEDICINE

## 2018-07-31 PROCEDURE — 27210794 ZZH OR GENERAL SUPPLY STERILE: Performed by: INTERNAL MEDICINE

## 2018-07-31 PROCEDURE — 25000128 H RX IP 250 OP 636: Performed by: NURSE ANESTHETIST, CERTIFIED REGISTERED

## 2018-07-31 PROCEDURE — 80048 BASIC METABOLIC PNL TOTAL CA: CPT | Performed by: EMERGENCY MEDICINE

## 2018-07-31 PROCEDURE — 37000008 ZZH ANESTHESIA TECHNICAL FEE, 1ST 30 MIN: Performed by: INTERNAL MEDICINE

## 2018-07-31 PROCEDURE — 83735 ASSAY OF MAGNESIUM: CPT | Performed by: EMERGENCY MEDICINE

## 2018-07-31 PROCEDURE — 96374 THER/PROPH/DIAG INJ IV PUSH: CPT

## 2018-07-31 PROCEDURE — 25000128 H RX IP 250 OP 636: Performed by: ANESTHESIOLOGY

## 2018-07-31 PROCEDURE — 85025 COMPLETE CBC W/AUTO DIFF WBC: CPT | Performed by: EMERGENCY MEDICINE

## 2018-07-31 PROCEDURE — 71000017 ZZH RECOVERY PHASE 1 LEVEL 3 EA ADDTL HR: Performed by: INTERNAL MEDICINE

## 2018-07-31 PROCEDURE — 37000009 ZZH ANESTHESIA TECHNICAL FEE, EACH ADDTL 15 MIN: Performed by: INTERNAL MEDICINE

## 2018-07-31 PROCEDURE — 36000051 ZZH SURGERY LEVEL 2 1ST 30 MIN - UMMC: Performed by: INTERNAL MEDICINE

## 2018-07-31 PROCEDURE — 25000128 H RX IP 250 OP 636: Performed by: EMERGENCY MEDICINE

## 2018-07-31 PROCEDURE — 25000125 ZZHC RX 250: Performed by: INTERNAL MEDICINE

## 2018-07-31 PROCEDURE — 27210995 ZZH RX 272: Performed by: INTERNAL MEDICINE

## 2018-07-31 PROCEDURE — 36000053 ZZH SURGERY LEVEL 2 EA 15 ADDTL MIN - UMMC: Performed by: INTERNAL MEDICINE

## 2018-07-31 PROCEDURE — 25000566 ZZH SEVOFLURANE, EA 15 MIN: Performed by: INTERNAL MEDICINE

## 2018-07-31 PROCEDURE — A9270 NON-COVERED ITEM OR SERVICE: HCPCS | Performed by: INTERNAL MEDICINE

## 2018-07-31 RX ORDER — MAGNESIUM SULFATE HEPTAHYDRATE 40 MG/ML
2 INJECTION, SOLUTION INTRAVENOUS DAILY PRN
Status: DISCONTINUED | OUTPATIENT
Start: 2018-07-31 | End: 2018-07-31 | Stop reason: HOSPADM

## 2018-07-31 RX ORDER — PROPOFOL 10 MG/ML
INJECTION, EMULSION INTRAVENOUS PRN
Status: DISCONTINUED | OUTPATIENT
Start: 2018-07-31 | End: 2018-07-31

## 2018-07-31 RX ORDER — PROCHLORPERAZINE MALEATE 5 MG
10 TABLET ORAL EVERY 6 HOURS PRN
Status: DISCONTINUED | OUTPATIENT
Start: 2018-07-31 | End: 2018-07-31 | Stop reason: HOSPADM

## 2018-07-31 RX ORDER — DIPHENHYDRAMINE HYDROCHLORIDE 50 MG/ML
25 INJECTION INTRAMUSCULAR; INTRAVENOUS ONCE
Status: COMPLETED | OUTPATIENT
Start: 2018-07-31 | End: 2018-07-31

## 2018-07-31 RX ORDER — ONDANSETRON 2 MG/ML
4 INJECTION INTRAMUSCULAR; INTRAVENOUS EVERY 30 MIN PRN
Status: DISCONTINUED | OUTPATIENT
Start: 2018-07-31 | End: 2018-07-31 | Stop reason: HOSPADM

## 2018-07-31 RX ORDER — MAGNESIUM SULFATE HEPTAHYDRATE 40 MG/ML
4 INJECTION, SOLUTION INTRAVENOUS EVERY 4 HOURS PRN
Status: DISCONTINUED | OUTPATIENT
Start: 2018-07-31 | End: 2018-07-31 | Stop reason: HOSPADM

## 2018-07-31 RX ORDER — LIDOCAINE HYDROCHLORIDE 20 MG/ML
INJECTION, SOLUTION INFILTRATION; PERINEURAL PRN
Status: DISCONTINUED | OUTPATIENT
Start: 2018-07-31 | End: 2018-07-31

## 2018-07-31 RX ORDER — POTASSIUM CHLORIDE 750 MG/1
20-40 TABLET, EXTENDED RELEASE ORAL
Status: DISCONTINUED | OUTPATIENT
Start: 2018-07-31 | End: 2018-07-31 | Stop reason: HOSPADM

## 2018-07-31 RX ORDER — KETOROLAC TROMETHAMINE 15 MG/ML
15 INJECTION, SOLUTION INTRAMUSCULAR; INTRAVENOUS ONCE
Status: COMPLETED | OUTPATIENT
Start: 2018-07-31 | End: 2018-07-31

## 2018-07-31 RX ORDER — PROMETHAZINE HYDROCHLORIDE 25 MG/ML
12.5 INJECTION, SOLUTION INTRAMUSCULAR; INTRAVENOUS ONCE
Status: COMPLETED | OUTPATIENT
Start: 2018-07-31 | End: 2018-07-31

## 2018-07-31 RX ORDER — NALOXONE HYDROCHLORIDE 0.4 MG/ML
.1-.4 INJECTION, SOLUTION INTRAMUSCULAR; INTRAVENOUS; SUBCUTANEOUS
Status: DISCONTINUED | OUTPATIENT
Start: 2018-07-31 | End: 2018-07-31 | Stop reason: HOSPADM

## 2018-07-31 RX ORDER — AMOXICILLIN 250 MG
1 CAPSULE ORAL 2 TIMES DAILY PRN
Status: DISCONTINUED | OUTPATIENT
Start: 2018-07-31 | End: 2018-07-31 | Stop reason: HOSPADM

## 2018-07-31 RX ORDER — SODIUM CHLORIDE 9 MG/ML
INJECTION, SOLUTION INTRAVENOUS CONTINUOUS PRN
Status: DISCONTINUED | OUTPATIENT
Start: 2018-07-31 | End: 2018-07-31

## 2018-07-31 RX ORDER — POTASSIUM CHLORIDE 29.8 MG/ML
20 INJECTION INTRAVENOUS
Status: DISCONTINUED | OUTPATIENT
Start: 2018-07-31 | End: 2018-07-31 | Stop reason: HOSPADM

## 2018-07-31 RX ORDER — PROCHLORPERAZINE 25 MG
25 SUPPOSITORY, RECTAL RECTAL EVERY 12 HOURS PRN
Status: DISCONTINUED | OUTPATIENT
Start: 2018-07-31 | End: 2018-07-31 | Stop reason: HOSPADM

## 2018-07-31 RX ORDER — ACETAMINOPHEN 500 MG
1000 TABLET ORAL EVERY 6 HOURS PRN
Status: DISCONTINUED | OUTPATIENT
Start: 2018-07-31 | End: 2018-07-31 | Stop reason: HOSPADM

## 2018-07-31 RX ORDER — POTASSIUM CHLORIDE 7.45 MG/ML
10 INJECTION INTRAVENOUS
Status: DISCONTINUED | OUTPATIENT
Start: 2018-07-31 | End: 2018-07-31 | Stop reason: HOSPADM

## 2018-07-31 RX ORDER — CLONIDINE HYDROCHLORIDE 0.1 MG/1
0.05 TABLET ORAL 2 TIMES DAILY
Status: DISCONTINUED | OUTPATIENT
Start: 2018-07-31 | End: 2018-07-31 | Stop reason: HOSPADM

## 2018-07-31 RX ORDER — SODIUM CHLORIDE, SODIUM LACTATE, POTASSIUM CHLORIDE, CALCIUM CHLORIDE 600; 310; 30; 20 MG/100ML; MG/100ML; MG/100ML; MG/100ML
INJECTION, SOLUTION INTRAVENOUS CONTINUOUS
Status: DISCONTINUED | OUTPATIENT
Start: 2018-07-31 | End: 2018-07-31 | Stop reason: HOSPADM

## 2018-07-31 RX ORDER — POTASSIUM CL/LIDO/0.9 % NACL 10MEQ/0.1L
10 INTRAVENOUS SOLUTION, PIGGYBACK (ML) INTRAVENOUS
Status: DISCONTINUED | OUTPATIENT
Start: 2018-07-31 | End: 2018-07-31 | Stop reason: HOSPADM

## 2018-07-31 RX ORDER — LURASIDONE HYDROCHLORIDE 40 MG/1
40 TABLET, FILM COATED ORAL EVERY EVENING
Status: DISCONTINUED | OUTPATIENT
Start: 2018-07-31 | End: 2018-07-31 | Stop reason: HOSPADM

## 2018-07-31 RX ORDER — FENTANYL CITRATE 50 UG/ML
INJECTION, SOLUTION INTRAMUSCULAR; INTRAVENOUS PRN
Status: DISCONTINUED | OUTPATIENT
Start: 2018-07-31 | End: 2018-07-31

## 2018-07-31 RX ORDER — ONDANSETRON 4 MG/1
4 TABLET, ORALLY DISINTEGRATING ORAL EVERY 30 MIN PRN
Status: DISCONTINUED | OUTPATIENT
Start: 2018-07-31 | End: 2018-07-31 | Stop reason: HOSPADM

## 2018-07-31 RX ORDER — DESVENLAFAXINE 50 MG/1
100 TABLET, FILM COATED, EXTENDED RELEASE ORAL EVERY MORNING
Status: DISCONTINUED | OUTPATIENT
Start: 2018-07-31 | End: 2018-07-31 | Stop reason: HOSPADM

## 2018-07-31 RX ORDER — SODIUM CHLORIDE 9 MG/ML
INJECTION, SOLUTION INTRAVENOUS CONTINUOUS
Status: DISCONTINUED | OUTPATIENT
Start: 2018-07-31 | End: 2018-07-31 | Stop reason: HOSPADM

## 2018-07-31 RX ORDER — AMOXICILLIN 250 MG
2 CAPSULE ORAL 2 TIMES DAILY PRN
Status: DISCONTINUED | OUTPATIENT
Start: 2018-07-31 | End: 2018-07-31 | Stop reason: HOSPADM

## 2018-07-31 RX ORDER — FENTANYL CITRATE 50 UG/ML
25-50 INJECTION, SOLUTION INTRAMUSCULAR; INTRAVENOUS
Status: DISCONTINUED | OUTPATIENT
Start: 2018-07-31 | End: 2018-07-31 | Stop reason: HOSPADM

## 2018-07-31 RX ORDER — ONDANSETRON 4 MG/1
4 TABLET, ORALLY DISINTEGRATING ORAL EVERY 6 HOURS PRN
Status: DISCONTINUED | OUTPATIENT
Start: 2018-07-31 | End: 2018-07-31 | Stop reason: HOSPADM

## 2018-07-31 RX ORDER — ONDANSETRON 2 MG/ML
4 INJECTION INTRAMUSCULAR; INTRAVENOUS EVERY 6 HOURS PRN
Status: DISCONTINUED | OUTPATIENT
Start: 2018-07-31 | End: 2018-07-31 | Stop reason: HOSPADM

## 2018-07-31 RX ORDER — ONDANSETRON 2 MG/ML
INJECTION INTRAMUSCULAR; INTRAVENOUS PRN
Status: DISCONTINUED | OUTPATIENT
Start: 2018-07-31 | End: 2018-07-31

## 2018-07-31 RX ORDER — OMEPRAZOLE 40 MG/1
40 CAPSULE, DELAYED RELEASE ORAL DAILY
Qty: 30 CAPSULE | Refills: 0 | Status: SHIPPED | OUTPATIENT
Start: 2018-07-31 | End: 2019-02-19

## 2018-07-31 RX ORDER — POTASSIUM CHLORIDE 1.5 G/1.58G
20-40 POWDER, FOR SOLUTION ORAL
Status: DISCONTINUED | OUTPATIENT
Start: 2018-07-31 | End: 2018-07-31 | Stop reason: HOSPADM

## 2018-07-31 RX ADMIN — FENTANYL CITRATE 25 MCG: 50 INJECTION INTRAMUSCULAR; INTRAVENOUS at 07:41

## 2018-07-31 RX ADMIN — FENTANYL CITRATE 50 MCG: 50 INJECTION INTRAMUSCULAR; INTRAVENOUS at 08:33

## 2018-07-31 RX ADMIN — WATER 100 ML: 100 IRRIGANT IRRIGATION at 06:49

## 2018-07-31 RX ADMIN — FENTANYL CITRATE 50 MCG: 50 INJECTION INTRAMUSCULAR; INTRAVENOUS at 07:12

## 2018-07-31 RX ADMIN — ONDANSETRON 4 MG: 2 INJECTION INTRAMUSCULAR; INTRAVENOUS at 06:43

## 2018-07-31 RX ADMIN — MIDAZOLAM 2 MG: 1 INJECTION INTRAMUSCULAR; INTRAVENOUS at 06:14

## 2018-07-31 RX ADMIN — PROPOFOL 200 MG: 10 INJECTION, EMULSION INTRAVENOUS at 06:25

## 2018-07-31 RX ADMIN — Medication 0.3 MG: at 07:53

## 2018-07-31 RX ADMIN — FENTANYL CITRATE 25 MCG: 50 INJECTION INTRAMUSCULAR; INTRAVENOUS at 07:26

## 2018-07-31 RX ADMIN — Medication 100 MG: at 06:25

## 2018-07-31 RX ADMIN — LIDOCAINE HYDROCHLORIDE 100 MG: 20 INJECTION, SOLUTION INFILTRATION; PERINEURAL at 06:23

## 2018-07-31 RX ADMIN — PROPOFOL 30 MG: 10 INJECTION, EMULSION INTRAVENOUS at 06:40

## 2018-07-31 RX ADMIN — SIMETHICONE 133 MG: 63.3; 3.7 SOLUTION/ DROPS ORAL at 06:45

## 2018-07-31 RX ADMIN — KETOROLAC TROMETHAMINE 15 MG: 15 INJECTION, SOLUTION INTRAMUSCULAR; INTRAVENOUS at 02:35

## 2018-07-31 RX ADMIN — FENTANYL CITRATE 50 MCG: 50 INJECTION INTRAMUSCULAR; INTRAVENOUS at 08:16

## 2018-07-31 RX ADMIN — DIPHENHYDRAMINE HYDROCHLORIDE 25 MG: 50 INJECTION, SOLUTION INTRAMUSCULAR; INTRAVENOUS at 07:17

## 2018-07-31 RX ADMIN — SIMETHICONE 133 MG: 63.3; 3.7 SOLUTION/ DROPS ORAL at 06:48

## 2018-07-31 RX ADMIN — PROMETHAZINE HYDROCHLORIDE 12.5 MG: 25 INJECTION INTRAMUSCULAR; INTRAVENOUS at 07:32

## 2018-07-31 RX ADMIN — FENTANYL CITRATE 100 MCG: 50 INJECTION, SOLUTION INTRAMUSCULAR; INTRAVENOUS at 06:19

## 2018-07-31 RX ADMIN — SODIUM CHLORIDE: 9 INJECTION, SOLUTION INTRAVENOUS at 06:14

## 2018-07-31 RX ADMIN — ACETAMINOPHEN 1000 MG: 500 TABLET, FILM COATED ORAL at 11:45

## 2018-07-31 RX ADMIN — PROMETHAZINE HYDROCHLORIDE 12.5 MG: 25 INJECTION INTRAMUSCULAR; INTRAVENOUS at 09:27

## 2018-07-31 NOTE — PROGRESS NOTES
Pt placed on 1:1 sitter upon arrival to unit d/t ingestion behaviors.  MARYCRUZ notified of arrival, supplies removed from room.

## 2018-07-31 NOTE — PROGRESS NOTES
Gastroenterology Endoscopy Suite Brief Operative Note    Procedure:  Upper endoscopy   Post-operative diagnosis:  foreign body removed successfully   Staff Physician:  Dr. Lokesh Paula   Fellow/Assistant(s):  Dejon Marsh MD    Specimens:  Please see final procedure note for further details.   Findings:  small antral erosions, no stigmata of recent bleeding.  foreign body removed intact, normal duodenum and esophagus   Complications:  None.   Condition:  Stable   Recommendations  Diet:  Return to previous diet  PPI:  PPI po once daily  Anti-coagulants/platelets:  N/A  Octreotide:  N/A  Discharge Planning:   PPI daily for a month  Psychiatry consult  Arben 1:1

## 2018-07-31 NOTE — ANESTHESIA PREPROCEDURE EVALUATION
Anesthesia Evaluation     . Pt has had prior anesthetic. Type: General (07/28/18; 0920; Mask Ventilation: Easy; Ease of Intubation: Easy; Airway Size: 7;  Cuffed;  Blade Type: Marie;  Blade Size: 3;  Place by: EU;  Insertion Attempts: 1;  Secured at (cm)to lip: 22 cm;  Breath Sounds: Equal, clear and bilateral;  End Tidal )           ROS/MED HX    ENT/Pulmonary:     (+)ZOHRA risk factors obese, , . .    Neurologic:     (+)migraines,     Cardiovascular:     (+) ----. : . . fainting (syncope). :. .       METS/Exercise Tolerance:     Hematologic:  - neg hematologic  ROS       Musculoskeletal:  - neg musculoskeletal ROS       GI/Hepatic:         Renal/Genitourinary:     (+) chronic renal disease,       Endo:     (+) Obesity, .      Psychiatric: Comment: Borderline personality disorder    (+) psychiatric history anxiety, depression and other (comment)      Infectious Disease:  - neg infectious disease ROS       Malignancy:      - no malignancy   Other:    - neg other ROS                 Physical Exam  Normal systems: dental    Airway   Mallampati: I  TM distance: >3 FB  Neck ROM: full    Dental     Cardiovascular   Rhythm and rate: regular and normal      Pulmonary    breath sounds clear to auscultation                    Anesthesia Plan      History & Physical Review  History and physical reviewed and following examination; no interval change.    ASA Status:  3 emergent.    NPO Status:  Waived due to emergency    Plan for General, RSI and ETT with Intravenous induction. Maintenance will be Inhalation.    PONV prophylaxis:  Ondansetron (or other 5HT-3) and Dexamethasone or Solumedrol       Postoperative Care  Postoperative pain management:  Multi-modal analgesia.      Consents  Anesthetic plan, risks, benefits and alternatives discussed with:  Patient..            Anesthesia Pre-op Note    Nevin Alvarado is a 27 year old female with past medical as described below is scheduled for Procedure(s):  COMBINED  ESOPHAGOSCOPY, GASTROSCOPY, DUODENOSCOPY (EGD) TO REMOVE FOREIGN BODY - Wound Class:     Past Medical History:   Diagnosis Date     ADD (attention deficit disorder)      Anorexia nervosa with bulimia     history of; on Topamax     Anxiety      Borderline personality disorder      Depression      Depressive disorder      H/O self-harm      Lives in independent group home     due to debilitating mental illness     Migraine without aura     no known triggers; on Topamax bid and Imitrex PRN     Morbid obesity (H)      PTSD (post-traumatic stress disorder)      Rectal foreign body - Recurrent issue, self placed      Swallowed foreign body - Recurrent issue, self placed      Past Surgical History:   Procedure Laterality Date     ESOPHAGOSCOPY, GASTROSCOPY, DUODENOSCOPY (EGD), COMBINED N/A 3/9/2017    Procedure: COMBINED ESOPHAGOSCOPY, GASTROSCOPY, DUODENOSCOPY (EGD), REMOVE FOREIGN BODY;  Surgeon: Avis Guzmán MD;  Location: [facility]     ESOPHAGOSCOPY, GASTROSCOPY, DUODENOSCOPY (EGD), COMBINED N/A 4/20/2017    Procedure: COMBINED ESOPHAGOSCOPY, GASTROSCOPY, DUODENOSCOPY (EGD), REMOVE FOREIGN BODY;  EGD removal Foregin body;  Surgeon: Lokesh Paula MD;  Location: [facility]     ESOPHAGOSCOPY, GASTROSCOPY, DUODENOSCOPY (EGD), COMBINED N/A 6/12/2017    Procedure: COMBINED ESOPHAGOSCOPY, GASTROSCOPY, DUODENOSCOPY (EGD);  COMBINED ESOPHAGOSCOPY, GASTROSCOPY, DUODENOSCOPY (EGD) [[id number]]attempted removal of foreign body;  Surgeon: Pamela Perez MD;  Location: [facility]     ESOPHAGOSCOPY, GASTROSCOPY, DUODENOSCOPY (EGD), COMBINED N/A 6/9/2017    Procedure: COMBINED ESOPHAGOSCOPY, GASTROSCOPY, DUODENOSCOPY (EGD), REMOVE FOREIGN BODY;  Esophagoscopy, Gastroscopy, Duodenoscopy, Removal of Foreign Body;  Surgeon: Dejon Marsh MD;  Location: [facility]     ESOPHAGOSCOPY, GASTROSCOPY, DUODENOSCOPY (EGD), COMBINED N/A 1/6/2018    Procedure: COMBINED ESOPHAGOSCOPY, GASTROSCOPY, DUODENOSCOPY (EGD), REMOVE  FOREIGN BODY;  COMBINED ESOPHAGOSCOPY, GASTROSCOPY, DUODENOSCOPY (EGD) [by pascal net and snare with profol sedation;  Surgeon: Feliciano Emmanuel MD;  Location:  GI     ESOPHAGOSCOPY, GASTROSCOPY, DUODENOSCOPY (EGD), COMBINED N/A 3/19/2018    Procedure: COMBINED ESOPHAGOSCOPY, GASTROSCOPY, DUODENOSCOPY (EGD), REMOVE FOREIGN BODY;   Esophagodscopy, Gastroscopy, Duodenoscopy,Foreign Body Removal;  Surgeon: Brice Guzmán MD;  Location: [facility]     ESOPHAGOSCOPY, GASTROSCOPY, DUODENOSCOPY (EGD), COMBINED N/A 4/16/2018    Procedure: COMBINED ESOPHAGOSCOPY, GASTROSCOPY, DUODENOSCOPY (EGD), REMOVE FOREIGN BODY;  Esophagogastroduodenoscopy  Foreign Body Removal X 2;  Surgeon: Royer Moise MD;  Location: [facility]     ESOPHAGOSCOPY, GASTROSCOPY, DUODENOSCOPY (EGD), COMBINED N/A 6/1/2018    Procedure: COMBINED ESOPHAGOSCOPY, GASTROSCOPY, DUODENOSCOPY (EGD), REMOVE FOREIGN BODY;  COMBINED ESOPHAGOSCOPY, GASTROSCOPY, DUODENOSCOPY with removal of foreign body, propofol sedation by anesthesia;  Surgeon: Brice Martinez MD;  Location:  GI     ESOPHAGOSCOPY, GASTROSCOPY, DUODENOSCOPY (EGD), COMBINED N/A 7/25/2018    Procedure: COMBINED ESOPHAGOSCOPY, GASTROSCOPY, DUODENOSCOPY (EGD), REMOVE FOREIGN BODY;;  Surgeon: Candy Castelan MD;  Location:  GI     ESOPHAGOSCOPY, GASTROSCOPY, DUODENOSCOPY (EGD), COMBINED N/A 7/28/2018    Procedure: COMBINED ESOPHAGOSCOPY, GASTROSCOPY, DUODENOSCOPY (EGD), REMOVE FOREIGN BODY;  COMBINED ESOPHAGOSCOPY, GASTROSCOPY, DUODENOSCOPY (EGD), REMOVE FOREIGN BODY;  Surgeon: Brice Guzmán MD;  Location: [facility]     EXAM UNDER ANESTHESIA ANUS N/A 1/10/2017    Procedure: EXAM UNDER ANESTHESIA ANUS;  Surgeon: Annmarie Haynes MD;  [facility]: [facility]     EXAM UNDER ANESTHESIA RECTUM N/A 7/19/2018    Procedure: EXAM UNDER ANESTHESIA RECTUM;  EXAM UNDER ANESTHESIA, REMOVAL OF RECTAL FOREIGN BODY;  Surgeon: Annmarie Haynes MD;  Location: [facility]     HC REMOVE  FECAL IMPACTION OR FB W ANESTHESIA N/A 12/18/2016    Procedure: REMOVE FECAL IMPACTION/FOREIGN BODY UNDER ANESTHESIA;  Surgeon: Soham Cano MD;  Location: [facility]     KNEE SURGERY - removed a small tissue mass from knee Right      LAPAROSCOPIC ABLATION ENDOMETRIOSIS       LAPAROTOMY EXPLORATORY N/A 1/10/2017    Procedure: LAPAROTOMY EXPLORATORY;  Surgeon: Annmarie Haynes MD;  Location: [facility]     lymph nodes removed from neck; benign  age 7     MAMMOPLASTY REDUCTION Bilateral      RELEASE CARPAL TUNNEL Bilateral      SIGMOIDOSCOPY FLEXIBLE N/A 1/10/2017    Procedure: SIGMOIDOSCOPY FLEXIBLE;  Surgeon: Annmarie Haynes MD;  Location: [facility]     SIGMOIDOSCOPY FLEXIBLE N/A 5/8/2018    Procedure: SIGMOIDOSCOPY FLEXIBLE;  flex sig with foreign body removal using snare and rattooth forcep;  Surgeon: Soham Cano MD;  Location: [facility]     Family History   Problem Relation Age of Onset     Type 2 Diabetes Maternal Grandmother      Type 2 Diabetes Paternal Grandmother      Breast Cancer Paternal Grandmother      Cerebrovascular Disease Father 53     Myocardial Infarction No family hx of      Coronary Artery Disease Early Onset No family hx of      Ovarian Cancer No family hx of      Colon Cancer No family hx of      Social History     Social History     Marital status: Single     Spouse name: N/A     Number of children: N/A     Years of education: N/A     Occupational History     On disability      Social History Main Topics     Smoking status: Never Smoker     Smokeless tobacco: Never Used     Alcohol use No     Drug use: No     Sexual activity: Yes     Partners: Male     Birth control/ protection: IUD      Comment: IUD - Mirena - placed July, 2015     Other Topics Concern     Not on file     Social History Narrative    Single.    Living in independent living portion of [facility].    On disability.    No regular exercise.      Current Outpatient Prescriptions   Medication Sig Dispense  Refill     acetaminophen (TYLENOL) 325 MG tablet Take 2 tablets (650 mg) by mouth every 8 hours 10 tablet 0     Cholecalciferol (VITAMIN D3 PO) Take 2,000 Units by mouth every morning        CLONIDINE HCL PO Take 0.05 mg by mouth 2 times daily        Desvenlafaxine Succinate (PRISTIQ PO) Take 100 mg by mouth every morning        DiphenhydrAMINE HCl (BENADRYL PO) Take 25-50 mg by mouth nightly as needed       levonorgestrel (MIRENA) 20 MCG/24HR IUD 1 each (20 mcg) by Intrauterine route once for 1 dose       lurasidone (LATUDA) 20 MG TABS tablet Take 2 tablets (40 mg) by mouth every evening 12 tablet 0     MELATONIN PO Take 3 mg by mouth nightly as needed (sleep)       Current Facility-Administered Medications   Medication     acetaminophen (TYLENOL) tablet 1,000 mg     cloNIDine (CATAPRES) half-tab 0.05 mg     desvenlafaxine succinate (PRISTIQ) 24 hr tablet 100 mg     lurasidone (LATUDA) tablet 40 mg     magnesium sulfate 2 g in water intermittent infusion     magnesium sulfate 4 g in 100 mL sterile water (premade)     melatonin tablet 1 mg     naloxone (NARCAN) injection 0.1-0.4 mg     ondansetron (ZOFRAN-ODT) ODT tab 4 mg    Or     ondansetron (ZOFRAN) injection 4 mg     pantoprazole (PROTONIX) 40 mg IV push injection     potassium chloride (KLOR-CON) Packet 20-40 mEq     potassium chloride 10 mEq in 100 mL intermittent infusion with 10 mg lidocaine     potassium chloride 10 mEq in 100 mL sterile water intermittent infusion (premix)     potassium chloride 20 mEq in 50 mL intermittent infusion     potassium chloride SA (K-DUR/KLOR-CON M) CR tablet 20-40 mEq     prochlorperazine (COMPAZINE) injection 10 mg    Or     prochlorperazine (COMPAZINE) tablet 10 mg    Or     prochlorperazine (COMPAZINE) Suppository 25 mg     senna-docusate (SENOKOT-S;PERICOLACE) 8.6-50 MG per tablet 1 tablet    Or     senna-docusate (SENOKOT-S;PERICOLACE) 8.6-50 MG per tablet 2 tablet     sodium chloride 0.9% infusion     Current Outpatient  Prescriptions   Medication     acetaminophen (TYLENOL) 325 MG tablet     Cholecalciferol (VITAMIN D3 PO)     CLONIDINE HCL PO     Desvenlafaxine Succinate (PRISTIQ PO)     DiphenhydrAMINE HCl (BENADRYL PO)     levonorgestrel (MIRENA) 20 MCG/24HR IUD     lurasidone (LATUDA) 20 MG TABS tablet     MELATONIN PO        Allergies   Allergen Reactions     Penicillins Anaphylaxis     Blood-Group Specific Substance Other (See Comments)     Patient has an anti-Cw and non-specific antibodies. Blood product orders may be delayed. Draw one red top and two purple top tubes for all type/screen/crossmatch orders.     Hydrocodone Nausea and Vomiting     vomiting for days     Influenza Vaccines Other (See Comments)     Oseltamivir Hives     med stopped, PN: med stopped     Vicodin [Hydrocodone-Acetaminophen] Nausea and Vomiting     Cephalosporins Rash     Lamotrigine Rash     Possibly associated with Lamictal.      Latex Rash         Lab:     CBC RESULTS:   Recent Labs   Lab Test  07/31/18   0155   WBC  11.6*   RBC  4.16   HGB  12.1   HCT  36.6   MCV  88   MCH  29.1   MCHC  33.1   RDW  13.5   PLT  258     Recent Labs   Lab Test  07/31/18   0155  07/22/18   2216   NA  138  141   POTASSIUM  3.4  3.4   CHLORIDE  103  106   CO2  27  26   ANIONGAP  9  8   GLC  94  94   BUN  6*  9   CR  0.58  0.55   KELBY  9.1  8.8     The laboratory has evaluated your INR and PTT and the results are as listed below.    Lab Results   Component Value Date    INR 1.09 07/19/2018     Lab Results   Component Value Date    PTT 37 01/31/2015

## 2018-07-31 NOTE — PROGRESS NOTES
Care Coordinator Progress Note    Admission Date/Time:  7/30/2018  Attending MD:  No att. providers found    Data  Chart reviewed, discussed with interdisciplinary team.   Patient was admitted for: Swallowed foreign body, initial encounter.    Concerns with insurance coverage for discharge needs: None.  Current Living Situation: Patient lives alone.  Support System: Supportive  Services Involved:   Transportation at Discharge: [place] transportation,  [employer] [phone]  Transportation to Medical Appointments:   - Not applicable  Barriers to Discharge: psychiatric illness    Coordination of Care and Referrals: Provided patient/family with options for discussion of pt current living situation and transportation.       Assessment  RNCC at bedside to discuss pt current living situation and transportation per Eloise MORALES. Pt states she lives alone and she states no one is available to come get her. Pt does state she uses [employer] to get to and from medical appointments. Writer encouraged pt to call for medical transport home, as she is familiar with using [employer] independently.     [facility]  ([place]) [phone]  [facility]  ([place]) [phone]     Plan  Anticipated Discharge Date:  07/31/18  Anticipated Discharge Plan:  DC with provider recommendations.    Jihan Alegre RN CC  [facility]  [phone]

## 2018-07-31 NOTE — CONSULTS
GASTROENTEROLOGY CONSULTATION      Date of Admission:  7/30/2018           Reason for Consultation:   We were asked by Dr. Andrade of ED medicine to evaluate this patient with foreign body ingestion           ASSESSMENT AND RECOMMENDATIONS:   Assessment:  27 year old female with a history of eating disorder NOS, anxiety, extensive foreign body ingestions/insertions, with hx of 3 ex laps, and 7 EGD's in the [facility] system in 2018 who presented with report of foreign body ingestion.  Last ate 3 pm on 7/30/18, ingestion of 6-7cm spring at around 5 pm an presented in asymptomatic state with imaging consistent with 6.7 cm non-sharp spring in her stomach on x-ray.  No alarm features. Plan for EGD at 6 am on 7/31/18 in the OR with general anesthesia.       Recommendations  -NPO  -serial abdominal exams  -Tentative plan for OR EGD at 6 am  -Consent is in the chart  -Consider psychiatry consultation      Gastroenterology outpatient follow up recommendations: pending clinical course.    Thank you for involving us in this patient's care. Please do not hesitate to contact the GI service with any questions or concerns.     Pt care plan discussed with Dr. Paula, GI staff physician.    Dejon Marsh  -------------------------------------------------------------------------------------------------------------------           History of Present Illness:   Nevin Alvarado is a 27 year old female with a history of eating disorder NOS, anxiety, extensive foreign body ingestions/insertions, with hx of 3 ex laps, and 7 EGD's in the [facility] system in 2018 who presented with report of foreign body ingestion.      Patient reports last ate at 3 pm.  Started feeling anxious and urge to ingest foreign body which occurred around 5 pm.  At time of consultation the patient denies nausea, vomiting, abdominal pain, and reports last BM within 24 hours and no melena or hematochezia.  Denies NSAIDS, alcohol and illicit drug use.  Patient has not  been taking recommended PPI daily for one month.      Per chart review: patient had similar foreign body ingestion on 7/28/18 with removal at 9:18 am.    Trace diminutive ulcerations in the antrum were noted felt 2/2 trauma from spring.  PPI daily recommended.          Past Medical History:   Reviewed and edited as appropriate  Past Medical History:   Diagnosis Date     ADD (attention deficit disorder)      Anorexia nervosa with bulimia     history of; on Topamax     Anxiety      Borderline personality disorder      Depression      Depressive disorder      H/O self-harm      Lives in independent group home     due to debilitating mental illness     Migraine without aura     no known triggers; on Topamax bid and Imitrex PRN     Morbid obesity (H)      PTSD (post-traumatic stress disorder)      Rectal foreign body - Recurrent issue, self placed      Swallowed foreign body - Recurrent issue, self placed             Past Surgical History:   Reviewed and edited as appropriate   Past Surgical History:   Procedure Laterality Date     ESOPHAGOSCOPY, GASTROSCOPY, DUODENOSCOPY (EGD), COMBINED N/A 3/9/2017    Procedure: COMBINED ESOPHAGOSCOPY, GASTROSCOPY, DUODENOSCOPY (EGD), REMOVE FOREIGN BODY;  Surgeon: Avis Guzmán MD;  Location: [facility]     ESOPHAGOSCOPY, GASTROSCOPY, DUODENOSCOPY (EGD), COMBINED N/A 4/20/2017    Procedure: COMBINED ESOPHAGOSCOPY, GASTROSCOPY, DUODENOSCOPY (EGD), REMOVE FOREIGN BODY;  EGD removal Foregin body;  Surgeon: Lokesh Paula MD;  Location: [facility]     ESOPHAGOSCOPY, GASTROSCOPY, DUODENOSCOPY (EGD), COMBINED N/A 6/12/2017    Procedure: COMBINED ESOPHAGOSCOPY, GASTROSCOPY, DUODENOSCOPY (EGD);  COMBINED ESOPHAGOSCOPY, GASTROSCOPY, DUODENOSCOPY (EGD) [[id number]]attempted removal of foreign body;  Surgeon: Pamela Perez MD;  Location: [facility]     ESOPHAGOSCOPY, GASTROSCOPY, DUODENOSCOPY (EGD), COMBINED N/A 6/9/2017    Procedure: COMBINED ESOPHAGOSCOPY, GASTROSCOPY,  DUODENOSCOPY (EGD), REMOVE FOREIGN BODY;  Esophagoscopy, Gastroscopy, Duodenoscopy, Removal of Foreign Body;  Surgeon: Dejon Marsh MD;  Location: [facility]     ESOPHAGOSCOPY, GASTROSCOPY, DUODENOSCOPY (EGD), COMBINED N/A 1/6/2018    Procedure: COMBINED ESOPHAGOSCOPY, GASTROSCOPY, DUODENOSCOPY (EGD), REMOVE FOREIGN BODY;  COMBINED ESOPHAGOSCOPY, GASTROSCOPY, DUODENOSCOPY (EGD) [by pascal net and snare with profol sedation;  Surgeon: Feliciano Emmanuel MD;  Location:  GI     ESOPHAGOSCOPY, GASTROSCOPY, DUODENOSCOPY (EGD), COMBINED N/A 3/19/2018    Procedure: COMBINED ESOPHAGOSCOPY, GASTROSCOPY, DUODENOSCOPY (EGD), REMOVE FOREIGN BODY;   Esophagodscopy, Gastroscopy, Duodenoscopy,Foreign Body Removal;  Surgeon: Brice Guzmán MD;  Location: [facility]     ESOPHAGOSCOPY, GASTROSCOPY, DUODENOSCOPY (EGD), COMBINED N/A 4/16/2018    Procedure: COMBINED ESOPHAGOSCOPY, GASTROSCOPY, DUODENOSCOPY (EGD), REMOVE FOREIGN BODY;  Esophagogastroduodenoscopy  Foreign Body Removal X 2;  Surgeon: Royer Moise MD;  [facility]: [facility]     ESOPHAGOSCOPY, GASTROSCOPY, DUODENOSCOPY (EGD), COMBINED N/A 6/1/2018    Procedure: COMBINED ESOPHAGOSCOPY, GASTROSCOPY, DUODENOSCOPY (EGD), REMOVE FOREIGN BODY;  COMBINED ESOPHAGOSCOPY, GASTROSCOPY, DUODENOSCOPY with removal of foreign body, propofol sedation by anesthesia;  Surgeon: Brice Martinez MD;  Location:  GI     ESOPHAGOSCOPY, GASTROSCOPY, DUODENOSCOPY (EGD), COMBINED N/A 7/25/2018    Procedure: COMBINED ESOPHAGOSCOPY, GASTROSCOPY, DUODENOSCOPY (EGD), REMOVE FOREIGN BODY;;  Surgeon: Candy Castelan MD;  Location:  GI     ESOPHAGOSCOPY, GASTROSCOPY, DUODENOSCOPY (EGD), COMBINED N/A 7/28/2018    Procedure: COMBINED ESOPHAGOSCOPY, GASTROSCOPY, DUODENOSCOPY (EGD), REMOVE FOREIGN BODY;  COMBINED ESOPHAGOSCOPY, GASTROSCOPY, DUODENOSCOPY (EGD), REMOVE FOREIGN BODY;  Surgeon: Brice Guzmán MD;  [facility]: [facility]     EXAM UNDER ANESTHESIA ANUS N/A 1/10/2017     Procedure: EXAM UNDER ANESTHESIA ANUS;  Surgeon: Annmarie Haynes MD;  [facility]     EXAM UNDER ANESTHESIA RECTUM N/A 7/19/2018    Procedure: EXAM UNDER ANESTHESIA RECTUM;  EXAM UNDER ANESTHESIA, REMOVAL OF RECTAL FOREIGN BODY;  Surgeon: Annmarie Haynes MD;  Location: [facility]     HC REMOVE FECAL IMPACTION OR FB W ANESTHESIA N/A 12/18/2016    Procedure: REMOVE FECAL IMPACTION/FOREIGN BODY UNDER ANESTHESIA;  Surgeon: Soham Cano MD;  Location: [facility]     KNEE SURGERY - removed a small tissue mass from knee Right      LAPAROSCOPIC ABLATION ENDOMETRIOSIS       LAPAROTOMY EXPLORATORY N/A 1/10/2017    Procedure: LAPAROTOMY EXPLORATORY;  Surgeon: Annmarie Haynes MD;  Location: [facility]     lymph nodes removed from neck; benign  age 7     MAMMOPLASTY REDUCTION Bilateral      RELEASE CARPAL TUNNEL Bilateral      SIGMOIDOSCOPY FLEXIBLE N/A 1/10/2017    Procedure: SIGMOIDOSCOPY FLEXIBLE;  Surgeon: Annmarie Haynes MD;  Location: [facility]     SIGMOIDOSCOPY FLEXIBLE N/A 5/8/2018    Procedure: SIGMOIDOSCOPY FLEXIBLE;  flex sig with foreign body removal using snare and rattooth forcep;  Surgeon: Soham Cano MD;  Location: [facility]            Previous Endoscopy:   No results found for this or any previous visit.         Social History:   Reviewed and edited as appropriate  Social History     Social History     Marital status: Single     Spouse name: N/A     Number of children: N/A     Years of education: N/A     Occupational History     On disability      Social History Main Topics     Smoking status: Never Smoker     Smokeless tobacco: Never Used     Alcohol use No     Drug use: No     Sexual activity: Yes     Partners: Male     Birth control/ protection: IUD      Comment: IUD - Mirena - placed July, 2015     Other Topics Concern     Not on file     Social History Narrative    Single.    Living in independent living portion of [employer].    On disability.    No regular  exercise.             Family History:   Reviewed and edited as appropriate  Family History   Problem Relation Age of Onset     Type 2 Diabetes Maternal Grandmother      Type 2 Diabetes Paternal Grandmother      Breast Cancer Paternal Grandmother      Cerebrovascular Disease Father 53     Myocardial Infarction No family hx of      Coronary Artery Disease Early Onset No family hx of      Ovarian Cancer No family hx of      Colon Cancer No family hx of         No known history of colorectal cancer, liver disease, or inflammatory bowel disease.       Allergies:   Reviewed and edited as appropriate     Allergies   Allergen Reactions     Penicillins Anaphylaxis     Blood-Group Specific Substance Other (See Comments)     Patient has an anti-Cw and non-specific antibodies. Blood product orders may be delayed. Draw one red top and two purple top tubes for all type/screen/crossmatch orders.     Hydrocodone Nausea and Vomiting     vomiting for days     Influenza Vaccines Other (See Comments)     Oseltamivir Hives     med stopped, PN: med stopped     Vicodin [Hydrocodone-Acetaminophen] Nausea and Vomiting     Cephalosporins Rash     Lamotrigine Rash     Possibly associated with Lamictal.      Latex Rash            Medications:     No current facility-administered medications for this encounter.      Current Outpatient Prescriptions   Medication Sig     acetaminophen (TYLENOL) 325 MG tablet Take 2 tablets (650 mg) by mouth every 8 hours     Cholecalciferol (VITAMIN D3 PO) Take 2,000 Units by mouth every morning      CLONIDINE HCL PO Take 0.1 mg by mouth 2 times daily     Desvenlafaxine Succinate (PRISTIQ PO) Take 100 mg by mouth every morning      levonorgestrel (MIRENA) 20 MCG/24HR IUD 1 each (20 mcg) by Intrauterine route once for 1 dose     lurasidone (LATUDA) 20 MG TABS tablet Take 2 tablets (40 mg) by mouth every evening     oxyCODONE IR (ROXICODONE) 5 MG tablet Take 1 tablet (5 mg) by mouth every 3 hours as needed for  "moderate to severe pain or other (pain control or improvement in physical function. Hold dose for analgesic side effects.)     MELATONIN PO Take 3 mg by mouth nightly as needed (sleep)             Review of Systems:   A complete review of systems was performed and is negative except as noted in the HPI           Physical Exam:   BP (!) 141/96  Pulse 85  Temp 98.7  F (37.1  C) (Oral)  Resp 16  Ht 1.575 m (5' 2\")  Wt 112.9 kg (249 lb)  SpO2 97%  Breastfeeding? No  BMI 45.54 kg/m2  Wt:   Wt Readings from Last 2 Encounters:   18 112.9 kg (249 lb)   18 112.9 kg (249 lb)      Constitutional: cooperative, pleasant, not dyspneic/diaphoretic, no acute distress  Eyes: Sclera anicteric/injected  Ears/nose/mouth/throat: Normal oropharynx without ulcers or exudate, mucus membranes moist, hearing intact  Neck: supple, thyroid normal size  CV: No edema  Respiratory: Unlabored breathing  Lymph: No axillary, submandibular, supraclavicular or inguinal lymphadenopathy  Abd: soft, Nondistended, +bs, no hepatosplenomegaly, nontender, no peritoneal signs, prior surgical scars appreciated  Skin: warm, perfused, no jaundice, excoriations noted  Neuro: AAO x 3, No asterixis  Psych: Normal affect  MSK: No gross deformities         Data:   Labs and imaging below were independently reviewed and interpreted    BMPNo lab results found in last 7 days.  CBCNo lab results found in last 7 days.  INRNo lab results found in last 7 days.  LFTsNo lab results found in last 7 days.   PANCNo lab results found in last 7 days.  HCG negative  Imagin2018 AXR:      IMPRESSION: There is a spiral shaped radiopacity projecting over the  stomach that likely represents a metallic spring foreign body.  "

## 2018-07-31 NOTE — ANESTHESIA CARE TRANSFER NOTE
Patient: Nevin Alvarado    Procedure(s):  COMBINED ESOPHAGOSCOPY, GASTROSCOPY, DUODENOSCOPY (EGD) TO REMOVE FOREIGN BODY - Wound Class: II-Clean Contaminated    Diagnosis: Removal of foreign body  Diagnosis Additional Information: No value filed.    Anesthesia Type:   General, RSI, ETT     Note:  Airway :Face Mask  Patient transferred to:PACU  Comments: To PACU on supplemental O2 with + air exchange, placed on monitor, waiting for PACU RN.     Report given to RN, questions answered, VSS, patient alert and comfortable.Handoff Report: Identifed the Patient, Identified the Reponsible Provider, Reviewed the pertinent medical history, Discussed the surgical course, Reviewed Intra-OP anesthesia mangement and issues during anesthesia, Set expectations for post-procedure period and Allowed opportunity for questions and acknowledgement of understanding      Vitals: (Last set prior to Anesthesia Care Transfer)    CRNA VITALS  7/31/2018 0626 - 7/31/2018 0702      7/31/2018             NIBP: 135/81    Pulse: 90    NIBP Mean: 99    Ht Rate: 90    SpO2: 99 %    EKG: Sinus rhythm                Electronically Signed By: HU Lindsay CRNA  July 31, 2018  7:02 AM

## 2018-07-31 NOTE — H&P
"[facility] ED Observation Admission Note    Chief Complaint   Patient presents with     Swallowed Foreign Body       Assessment/Plan:  1. Ingested Foreign Body: h/o multiple admissions every month here and in Care Everywhere for ingestion and rectal insertion that needs to be retrieved. ~ 5pm yesterday decided to swallow a spring inside a pen. Presented ~ 6:30pm. Denies any pain, nausea, vomiting, hematochezia, melena, headache, lightheadedness. Yesterday saw a therapist for the first time in a long time and states after talking to someone her mood was \"crappy\". + thoughts of self harm, no plan. Reports reason for action as compulsion and habit that she has a hard time controlling. No intent of self harm. Lives alone now in an apartment but is going to be moving to an apartment, supported living, in [facility] on Sept 1st and that is causing some anxiety. More depressed than usual. Denies any anger, regret, remorse. Support system is , ILS worker, psychiatrist, therapist. Psychiatrist is through [facility]. In the ED, HR 85, /86, RR 16, SaO2 99% on RA, Temp 98.7. Labs show BMP with K 3.4 otherwise normal. CBC with WBC of 11.6, abs neutrophil 8.6 otherwise normal. UA on 7/28/18 was clear. AXR shows a spiral shaped radiopacity projecting over the stomach that likely represents a metallic spring foreign body. In the ED the patient was given toradol 15 mg IV x 1. She currently has a security personal as a bedside attendant in the ED. Until a 1:1 sitter can be secured for ED Obs admission she will continue to board in the ED   - GI consulted. Plan to OR for EGD and extraction in AM  - NPO  - Serial abdominal exams  - Protonix 40mg IV BID  - NS at 125ml/hr  - Avoid narcotics  - 1:1 sitter  - Zofran prn  - Psychiatry Consult  - Send UDS     2. Borderline Personality Disorder: - Continue with PTA Latuda, Pristiq, Clonidine   - Psychiatry consult for worsening mood    3. Hypokalemia: K 3.4  - Replace K per " "protocol  - Check Mag level    4. Leukocytosis: WBC 11.6. Afebrile. No fever or chills. No respiratory or GI symptoms. Admits to pelvic pain with urination and flank pain. R/o UTI  - Send UA with reflex      HPI:    Nevin Alvarado is a 27 year old female with a history of ADD, MDD, PTSD, borderline personality disorder, multiple OD's, SIB with cutting and foreign body ingestion and insertion who presented to the ED after swallowing a spring out of a pen. She is being admitted to the Observation Unit for a planned EGD procedure for extraction this morning. At about 5pm yesterday she decided to swallow the spring inside a pen. She came to the ED with a cab at 6:30pm. Denies any pain, nausea, vomiting, hematochezia, melena, headache, lightheadedness. Yesterday she saw a therapist for the first time in a long time and states after talking to someone her mood was \"crappy\". She states she had thoughts of harming herself here and there all day. She had no plan in place. When asked why she swallowed the spring she reports its a compulsion. She describes it as just a habit that she has a hard time controlling and not an intent to harm herself. She does not have any family support system. Her support system is a  who comes over to her apartment every couple of days; , ILS worker, psychiatrist, therapist. [facility] is [facility]. She states she is going to be moving to an Apartment, supported living, in [facility] on Sept 1st and that is causing some anxiety. She has never lived there before. Currently lives alone in her own apartment. Admits to feeling a little more depressed than usual. Denies any anger, regret, remorse. Reports some LE edema for several months. Reports flank pain and pelvic pain with urination. Denies fever, chills, dysuria, hematuria, urgency, URI symptoms, diarrhea, vomiting.  Off note she was admitted under CRS on 7/19/18 with foreign body x 2 in her rectum after she " was trying to pleasure herself with inserting two glass bottles of essential oils into her rectum; attempt to remove them failed. She was also admitted on 7/28/18 after swallowing a spring out of a pen and was extracted by GI. Otherwise prior to that her previous admission was in April 2018 after swallowing a mickie pin. She has several admissions in Care Everywhere, several times a month for foreign body ingestion and some intentional overdose admissions.     In the ED, HR 85, /86, RR 16, SaO2 99% on RA, Temp 98.7. Labs show BMP with K 3.4 otherwise normal. CBC with WBC of 11.6, abs neutrophil 8.6 otherwise normal. UA on 7/28/18 was clear. AXR shows a spiral shaped radiopacity projecting over the stomach that likely represents a metallic spring foreign body. In the ED the patient was given toradol 15 mg IV x 1. She currently has a security personal as a bedside attendant in the ED. Until a 1:1 sitter can be secured for ED Obs admission she will continue to board in the ED.    On admission to the observation unit the patient was stable.    History:    Past Medical History:   Diagnosis Date     ADD (attention deficit disorder)      Anorexia nervosa with bulimia     history of; on Topamax     Anxiety      Borderline personality disorder      Depression      Depressive disorder      H/O self-harm      Lives in independent group home     due to debilitating mental illness     Migraine without aura     no known triggers; on Topamax bid and Imitrex PRN     Morbid obesity (H)      PTSD (post-traumatic stress disorder)      Rectal foreign body - Recurrent issue, self placed      Swallowed foreign body - Recurrent issue, self placed        Past Surgical History:   Procedure Laterality Date     ESOPHAGOSCOPY, GASTROSCOPY, DUODENOSCOPY (EGD), COMBINED N/A 3/9/2017    Procedure: COMBINED ESOPHAGOSCOPY, GASTROSCOPY, DUODENOSCOPY (EGD), REMOVE FOREIGN BODY;  Surgeon: Avis Guzmán MD;  Location:  OR      ESOPHAGOSCOPY, GASTROSCOPY, DUODENOSCOPY (EGD), COMBINED N/A 4/20/2017    Procedure: COMBINED ESOPHAGOSCOPY, GASTROSCOPY, DUODENOSCOPY (EGD), REMOVE FOREIGN BODY;  EGD removal Foregin body;  Surgeon: Lokesh Paula MD;  [facility]     ESOPHAGOSCOPY, GASTROSCOPY, DUODENOSCOPY (EGD), COMBINED N/A 6/12/2017    Procedure: COMBINED ESOPHAGOSCOPY, GASTROSCOPY, DUODENOSCOPY (EGD);  COMBINED ESOPHAGOSCOPY, GASTROSCOPY, DUODENOSCOPY (EGD) [[id number]]attempted removal of foreign body;  Surgeon: Pamela Perez MD;  [facility]: [facility]     ESOPHAGOSCOPY, GASTROSCOPY, DUODENOSCOPY (EGD), COMBINED N/A 6/9/2017    Procedure: COMBINED ESOPHAGOSCOPY, GASTROSCOPY, DUODENOSCOPY (EGD), REMOVE FOREIGN BODY;  Esophagoscopy, Gastroscopy, Duodenoscopy, Removal of Foreign Body;  Surgeon: Dejon Marsh MD;  [facility]     ESOPHAGOSCOPY, GASTROSCOPY, DUODENOSCOPY (EGD), COMBINED N/A 1/6/2018    Procedure: COMBINED ESOPHAGOSCOPY, GASTROSCOPY, DUODENOSCOPY (EGD), REMOVE FOREIGN BODY;  COMBINED ESOPHAGOSCOPY, GASTROSCOPY, DUODENOSCOPY (EGD) [by pascal net and snare with profol sedation;  Surgeon: Feliciano Emmanuel MD;  Location:  GI     ESOPHAGOSCOPY, GASTROSCOPY, DUODENOSCOPY (EGD), COMBINED N/A 3/19/2018    Procedure: COMBINED ESOPHAGOSCOPY, GASTROSCOPY, DUODENOSCOPY (EGD), REMOVE FOREIGN BODY;   Esophagodscopy, Gastroscopy, Duodenoscopy,Foreign Body Removal;  Surgeon: Brice Guzmán MD;  [facility]: [facility]     ESOPHAGOSCOPY, GASTROSCOPY, DUODENOSCOPY (EGD), COMBINED N/A 4/16/2018    Procedure: COMBINED ESOPHAGOSCOPY, GASTROSCOPY, DUODENOSCOPY (EGD), REMOVE FOREIGN BODY;  Esophagogastroduodenoscopy  Foreign Body Removal X 2;  Surgeon: Royer Moise MD;  [facility]     ESOPHAGOSCOPY, GASTROSCOPY, DUODENOSCOPY (EGD), COMBINED N/A 6/1/2018    Procedure: COMBINED ESOPHAGOSCOPY, GASTROSCOPY, DUODENOSCOPY (EGD), REMOVE FOREIGN BODY;  COMBINED ESOPHAGOSCOPY, GASTROSCOPY, DUODENOSCOPY with  removal of foreign body, propofol sedation by anesthesia;  Surgeon: Brice Martinez MD;  Location:  GI     ESOPHAGOSCOPY, GASTROSCOPY, DUODENOSCOPY (EGD), COMBINED N/A 7/25/2018    Procedure: COMBINED ESOPHAGOSCOPY, GASTROSCOPY, DUODENOSCOPY (EGD), REMOVE FOREIGN BODY;;  Surgeon: Candy Castelan MD;  Location:  GI     ESOPHAGOSCOPY, GASTROSCOPY, DUODENOSCOPY (EGD), COMBINED N/A 7/28/2018    Procedure: COMBINED ESOPHAGOSCOPY, GASTROSCOPY, DUODENOSCOPY (EGD), REMOVE FOREIGN BODY;  COMBINED ESOPHAGOSCOPY, GASTROSCOPY, DUODENOSCOPY (EGD), REMOVE FOREIGN BODY;  Surgeon: Brice Guzmán MD;  [facility]     EXAM UNDER ANESTHESIA ANUS N/A 1/10/2017    Procedure: EXAM UNDER ANESTHESIA ANUS;  Surgeon: Annmarie Haynes MD;  [facility]     EXAM UNDER ANESTHESIA RECTUM N/A 7/19/2018    Procedure: EXAM UNDER ANESTHESIA RECTUM;  EXAM UNDER ANESTHESIA, REMOVAL OF RECTAL FOREIGN BODY;  Surgeon: Annmarie Haynes MD;  Location: [facility]     HC REMOVE FECAL IMPACTION OR FB W ANESTHESIA N/A 12/18/2016    Procedure: REMOVE FECAL IMPACTION/FOREIGN BODY UNDER ANESTHESIA;  Surgeon: Soham Cano MD;  Location:  OR     KNEE SURGERY - removed a small tissue mass from knee Right      LAPAROSCOPIC ABLATION ENDOMETRIOSIS       LAPAROTOMY EXPLORATORY N/A 1/10/2017    Procedure: LAPAROTOMY EXPLORATORY;  Surgeon: Annmarie Haynes MD;  Location: [facility] OR     lymph nodes removed from neck; benign  age 7     MAMMOPLASTY REDUCTION Bilateral      RELEASE CARPAL TUNNEL Bilateral      SIGMOIDOSCOPY FLEXIBLE N/A 1/10/2017    Procedure: SIGMOIDOSCOPY FLEXIBLE;  Surgeon: Annmarie Haynes MD;  [facility]: [facility]     SIGMOIDOSCOPY FLEXIBLE N/A 5/8/2018    Procedure: SIGMOIDOSCOPY FLEXIBLE;  flex sig with foreign body removal using snare and rattooth forcep;  Surgeon: Soham Cano MD;  Location:  GI       Family History   Problem Relation Age of Onset     Type 2 Diabetes Maternal Grandmother       Type 2 Diabetes Paternal Grandmother      Breast Cancer Paternal Grandmother      Cerebrovascular Disease Father 53     Myocardial Infarction No family hx of      Coronary Artery Disease Early Onset No family hx of      Ovarian Cancer No family hx of      Colon Cancer No family hx of        Social History     Social History     Marital status: Single     Spouse name: N/A     Number of children: N/A     Years of education: N/A     Occupational History     On disability      Social History Main Topics     Smoking status: Never Smoker     Smokeless tobacco: Never Used     Alcohol use No     Drug use: No     Sexual activity: Yes     Partners: Male     Birth control/ protection: IUD      Comment: IUD - Mirena - placed July, 2015     Other Topics Concern     Not on file     Social History Narrative    Single.    Living in independent living portion of [facility].    On disability.    No regular exercise.          No current facility-administered medications on file prior to encounter.   Current Outpatient Prescriptions on File Prior to Encounter:  acetaminophen (TYLENOL) 325 MG tablet Take 2 tablets (650 mg) by mouth every 8 hours   Cholecalciferol (VITAMIN D3 PO) Take 2,000 Units by mouth every morning    CLONIDINE HCL PO Take 0.05 mg by mouth 2 times daily    Desvenlafaxine Succinate (PRISTIQ PO) Take 100 mg by mouth every morning    levonorgestrel (MIRENA) 20 MCG/24HR IUD 1 each (20 mcg) by Intrauterine route once for 1 dose   lurasidone (LATUDA) 20 MG TABS tablet Take 2 tablets (40 mg) by mouth every evening   MELATONIN PO Take 3 mg by mouth nightly as needed (sleep)       Data:    Results for orders placed or performed during the hospital encounter of 07/30/18   XR Abdomen 1 View    Narrative    XR ABDOMEN 1 VW  7/30/2018 10:37 PM      HISTORY: eval for fb;     COMPARISON: 7/2/2018    FINDINGS: There is a spiral shaped radiopacity projecting over the  stomach that likely represents a metallic spring.  Nonobstructive bowel  gas pattern with no pneumatosis or portal venous gas.      Impression    IMPRESSION: There is a spiral shaped radiopacity projecting over the  stomach that likely represents a metallic spring foreign body.   CBC with platelets differential   Result Value Ref Range    WBC 11.6 (H) 4.0 - 11.0 10e9/L    RBC Count 4.16 3.8 - 5.2 10e12/L    Hemoglobin 12.1 11.7 - 15.7 g/dL    Hematocrit 36.6 35.0 - 47.0 %    MCV 88 78 - 100 fl    MCH 29.1 26.5 - 33.0 pg    MCHC 33.1 31.5 - 36.5 g/dL    RDW 13.5 10.0 - 15.0 %    Platelet Count 258 150 - 450 10e9/L    Diff Method Automated Method     % Neutrophils 73.9 %    % Lymphocytes 19.8 %    % Monocytes 5.1 %    % Eosinophils 0.8 %    % Basophils 0.2 %    % Immature Granulocytes 0.2 %    Nucleated RBCs 0 0 /100    Absolute Neutrophil 8.6 (H) 1.6 - 8.3 10e9/L    Absolute Lymphocytes 2.3 0.8 - 5.3 10e9/L    Absolute Monocytes 0.6 0.0 - 1.3 10e9/L    Absolute Eosinophils 0.1 0.0 - 0.7 10e9/L    Absolute Basophils 0.0 0.0 - 0.2 10e9/L    Abs Immature Granulocytes 0.0 0 - 0.4 10e9/L    Absolute Nucleated RBC 0.0    Basic metabolic panel   Result Value Ref Range    Sodium 138 133 - 144 mmol/L    Potassium 3.4 3.4 - 5.3 mmol/L    Chloride 103 94 - 109 mmol/L    Carbon Dioxide 27 20 - 32 mmol/L    Anion Gap 9 3 - 14 mmol/L    Glucose 94 70 - 99 mg/dL    Urea Nitrogen 6 (L) 7 - 30 mg/dL    Creatinine 0.58 0.52 - 1.04 mg/dL    GFR Estimate >90 >60 mL/min/1.7m2    GFR Estimate If Black >90 >60 mL/min/1.7m2    Calcium 9.1 8.5 - 10.1 mg/dL        ROS:    Review Of Systems  Skin: negative  Eyes: negative  Ears/Nose/Throat: negative  Respiratory: No shortness of breath, dyspnea on exertion, cough, or hemoptysis  Cardiovascular: negative  Gastrointestinal: negative  Genitourinary: negative  Musculoskeletal: negative  Neurologic: negative  Psychiatric: sleep disturbance, feeling anxious, anxiety, depression and thoughts of self-harm  Hematologic/Lymphatic/Immunologic:  negative  Endocrine: negative  PCP: Dr. Knight    10 point ROS negative other than the symptoms noted above.      Exam:  Vitals:  B/P: 123/86, T: 98.7, P: 86, R: 16    Constitutional: healthy, alert and no distress  Head: Normocephalic. No masses, lesions, tenderness or abnormalities  Neck: Neck supple. No adenopathy. Thyroid symmetric, normal size,, Carotids without bruits.  ENT: ENT exam normal, no neck nodes or sinus tenderness  Cardiovascular: negative, PMI normal. No lifts, heaves, or thrills. RRR. No murmurs, clicks gallops or rub  Respiratory: negative, Percussion normal. Good diaphragmatic excursion. Lungs clear  Gastrointestinal: Abdomen soft, non-tender. BS normal. No masses, organomegaly  : Deferred  Musculoskeletal: extremities normal- no gross deformities noted, gait normal and normal muscle tone  Ext: Mild B/L LE edema ~ 1+  Skin: old healed lacerations on UE  Neurologic: Gait normal. Reflexes normal and symmetric. Sensation grossly WNL.  Psychiatric: mentation appears normal and affect normal/bright  Hematologic/Lymphatic/Immunologic: normal ant/post cervical, axillary, supraclavicular and inguinal nodes    Consults: GI  FEN: NPO  DVT prophylaxis: encourage ambulation  GI prophylaxis: protonix  BM prophylaxis: pericolace  Code Status: full code  Disposition: Transfer to [facility] [place], [place] under Psychiatry after foreign body removal by GI, stable labs and vitals    Signed:  Tyson Richardson PA-C   July 31, 2018 at 3:45 AM

## 2018-07-31 NOTE — PROGRESS NOTES
Brief GI Note    Pt seen in f/u after EGD. No acute complaints other than minimal LUQ soreness. Abdomen soft, non-distended. Ordered meal.     Recommended Psychiatry consult given repeat swallowing behavior    GI will sign off, call with questions.     Brice Rosas MD  GI Fellow  p [phone]

## 2018-07-31 NOTE — ED NOTES
Pt denies suicidal ideation. Pt very pleasant, agreeable to care and watch. RN and security searched pt. No harmful objects found on person

## 2018-07-31 NOTE — DISCHARGE SUMMARY
Discharge Summary    Nevin Alvarado MRN# [id number]   YOB: 1990 Age: 27 year old     Date of Admission:  7/30/2018  Date of Discharge:  7/31/2018  Admitting Physician:  Estrada Bee MD  Discharge Physician:  Dr. eBe  Discharging Service:  Emergency Medicine     Primary Provider: Latonya Knight          Discharge Diagnosis:     Foreign body ingestion    * No resolved hospital problems. *               Discharge Disposition:   Discharged to home           Condition on Discharge:   Discharge condition: Stable   Code status on discharge: Full Code           Procedures:   Invasive procedures: EGD          Discharge Medications:     Current Discharge Medication List      START taking these medications    Details   omeprazole (PRILOSEC) 40 MG capsule Take 1 capsule (40 mg) by mouth daily Take 30-60 minutes before a meal.  Qty: 30 capsule, Refills: 0    Associated Diagnoses: Swallowed foreign body, initial encounter         CONTINUE these medications which have NOT CHANGED    Details   acetaminophen (TYLENOL) 325 MG tablet Take 2 tablets (650 mg) by mouth every 8 hours  Qty: 10 tablet, Refills: 0    Associated Diagnoses: Rectal foreign body, initial encounter      Cholecalciferol (VITAMIN D3 PO) Take 2,000 Units by mouth every morning       CLONIDINE HCL PO Take 0.05 mg by mouth 2 times daily       Desvenlafaxine Succinate (PRISTIQ PO) Take 100 mg by mouth every morning       DiphenhydrAMINE HCl (BENADRYL PO) Take 25-50 mg by mouth nightly as needed      levonorgestrel (MIRENA) 20 MCG/24HR IUD 1 each (20 mcg) by Intrauterine route once for 1 dose      lurasidone (LATUDA) 20 MG TABS tablet Take 2 tablets (40 mg) by mouth every evening  Qty: 12 tablet, Refills: 0    Associated Diagnoses: Borderline personality disorder; Severe episode of recurrent major depressive disorder, without psychotic features (H)      MELATONIN PO Take 3 mg by mouth nightly as needed (sleep)                  "  Consultations:   Consultation during this admission received from gastroenterology, rashid consult by psychiatry             Brief History of Illness:   Please see detailed H&P from 7/31/18, in brief: Nevin Alvarado is a 27 year old female with a history of ADD, MDD, PTSD, borderline personality disorder, multiple OD's, SIB with cutting and foreign body ingestion and insertion who presented to the ED after swallowing a spring out of a pen. She is being admitted to the Observation Unit for a planned EGD procedure for extraction this morning.          Hospital Course:   1. Ingested Foreign Body: h/o multiple admissions every month here and in Care Everywhere for ingestion and rectal insertion that needs to be retrieved. ~ 5pm yesterday decided to swallow a spring inside a pen. Presented ~ 6:30pm. Denies any pain, nausea, vomiting, hematochezia, melena, headache, lightheadedness. Yesterday saw a therapist for the first time in a long time and states after talking to someone her mood was \"crappy\". + thoughts of self harm, no plan. Reports reason for action as compulsion and habit that she has a hard time controlling. No intent of self harm. Lives alone now in an apartment but is going to be moving to an apartment, supported living, in [facility] on Sept 1st and that is causing some anxiety. More depressed than usual. Denies any anger, regret, remorse. Support system is , ILS worker, psychiatrist, therapist. Psychiatrist is through [facility]. In the ED, HR 85, /86, RR 16, SaO2 99% on RA, Temp 98.7. Labs show BMP with K 3.4 otherwise normal. CBC with WBC of 11.6, abs neutrophil 8.6 otherwise normal. UA on 7/28/18 was clear. AXR shows a spiral shaped radiopacity projecting over the stomach that likely represents a metallic spring foreign body. In the ED the patient was given toradol 15 mg IV x 1. Patient went from ED to OR for EGD and foreign body was removed.  Spoke with Dr. Solano from " "psychiatry (see his note from 7/19)- he had no recommendations for changes for patient.  Patient denied current SI/HI.  Feels at baseline.  Diet advanced to regular and she was able to tolerate a regular meal, had slight LUQ pain.  Given tylenol.  She was discharged on prilosec 40 mg po daily.  Recommended she follow up closely with PCP.       2. Borderline Personality Disorder: - Continue with PTA Latuda, Pristiq, Clonidine        3. Hypokalemia: K 3.4  - Replace K per protocol       4. Leukocytosis: WBC 11.6. Afebrile. No fever or chills. No respiratory or GI symptoms.  Likely stress related  -follow up with PCP            Final Day of Progress before Discharge:       Physical Exam:  Blood pressure 127/76, pulse 82, temperature 98.1  F (36.7  C), temperature source Oral, resp. rate 18, height 1.575 m (5' 2\"), weight 112.9 kg (249 lb), SpO2 95 %, not currently breastfeeding.    EXAM:  Exam:  Constitutional: healthy, alert and no distress  Cardiovascular: No lifts, heaves, or thrills. RRR. No murmurs, clicks gallops or rub  Respiratory: Good diaphragmatic excursion. Lungs clear  Gastrointestinal: Abdomen soft, slight LUQ tenderness, no guarding or rebound. BS normal. No masses, organomegaly  Musculoskeletal: extremities normal- no gross deformities noted, gait normal and normal muscle tone  Skin: no suspicious lesions or rashes  Neurologic: Gait normal. Alert and oriented.   Psychiatric: mentation appears normal and affect normal/bright    /76 (BP Location: Left arm)  Pulse 82  Temp 98.1  F (36.7  C) (Oral)  Resp 18  Ht 1.575 m (5' 2\")  Wt 112.9 kg (249 lb)  SpO2 95%  Breastfeeding? No  BMI 45.54 kg/m2             Data:  All laboratory data reviewed             Significant Results:     Results for orders placed or performed during the hospital encounter of 07/30/18   UPPER GI ENDOSCOPY   Result Value Ref Range    Upper GI Endoscopy       [facility]  [address] [address] " [phone]     Endoscopy Department  _______________________________________________________________________________  Patient Name: Nevin Alvarado     Procedure Date: 7/31/2018 5:28 AM  MRN: [id number]                       Account Number: [id number]  YOB: 1990             Admit Type: Outpatient  Age: 27                                Gender: Female  Note Status: Finalized                Attending MD: Lokesh Paula ,   Total Sedation Time:                    _______________________________________________________________________________     Procedure:           Upper GI endoscopy  Indications:         Removal of foreign body in the stomach  Providers:           Lokesh Paula  Patient Profile:     Self ingestion of spring from a pen.  Referring MD:          Medicines:           General Anesthesia  Complications:       No immediate complications.  _______________________________________ ________________________________________  Procedure:           Pre-Anesthesia Assessment:                       - Prior to the procedure, a History and Physical was                        performed, and patient medications and allergies were                        reviewed. The patient is competent. The risks and                        benefits of the procedure and the sedation options and                        risks were discussed with the patient. All questions                        were answered and informed consent was obtained. Patient                        identification and proposed procedure were verified by                        the physician and the nurse in the pre-procedure area in                        the procedure room. Mental Status Examination: alert and                        oriented. Airway Examination: normal oropharyngeal                        airway and neck mobility and Mallampati Class III (part                        of the uvula and soft palate visualiz ed).  Respiratory                        Examination: clear to auscultation. CV Examination:                        normal. Prophylactic Antibiotics: The patient does not                        require prophylactic antibiotics. Prior Anticoagulants:                        The patient has taken no previous anticoagulant or                        antiplatelet agents. ASA Grade Assessment: III - A                        patient with severe systemic disease. After reviewing                        the risks and benefits, the patient was deemed in                        satisfactory condition to undergo the procedure. The                        anesthesia plan was to use general anesthesia.                        Immediately prior to administration of medications, the                        patient was re-assessed for adequacy to receive                        sedatives. The heart rate, respiratory rate, oxygen                        saturations, blood pressure, adequacy of pulmonary                         ventilation, and response to care were monitored                        throughout the procedure. The physical status of the                        patient was re-assessed after the procedure.                       After obtaining informed consent, the endoscope was                        passed under direct vision. Throughout the procedure,                        the patient's blood pressure, pulse, and oxygen                        saturations were monitored continuously. The was                        introduced through the mouth, and advanced to the second                        part of duodenum. The upper GI endoscopy was                        accomplished without difficulty. The patient tolerated                        the procedure well.                                                                                   Findings:       The examined esophagus was normal.       Patchy mild inflammation characterized by  erosions was found in the        gastric antrum.        Pen spring was found in the gastric body. Removal was accomplished with        a rat-toothed forceps. Verification of patient identification for the        specimen was done. Estimated blood loss was minimal.       The examined duodenum was normal.                                                                                   Impression:          - Normal esophagus.                       - Gastritis.                       - Pen spring were found in the stomach. Removal was                        successful.                       - Normal examined duodenum.  Recommendation:      - [facility].                       - Resume previous diet.                       - Continue present medications.                       - Psychiatry consult given repeat swallowing behavior.                                                                                     Electronically signed by: Lokesh Paula MD  _____________________  Lokesh Paula,   7/ 31/2018 6:54:15 AM  I was physically present for the entire viewing portion of the exam.  __________________________  Signature of teaching physician  Amairani/Jair Paula  Number of Addenda: 0    Note Initiated On: 7/31/2018 5:28 AM  Scope In:  Scope Out:     XR Abdomen 1 View    Narrative    XR ABDOMEN 1 VW  7/30/2018 10:37 PM      HISTORY: eval for fb;     COMPARISON: 7/2/2018    FINDINGS: There is a spiral shaped radiopacity projecting over the  stomach that likely represents a metallic spring. Nonobstructive bowel  gas pattern with no pneumatosis or portal venous gas. Elevation of the  right hemidiaphragm, dating back to at least January.      Impression    IMPRESSION: There is a spiral shaped radiopacity projecting over the  stomach that likely represents a metallic spring foreign body.    I have personally reviewed the examination and initial interpretation  and I agree with the  findings.    TAMIKO VAZQUEZ MD   CBC with platelets differential   Result Value Ref Range    WBC 11.6 (H) 4.0 - 11.0 10e9/L    RBC Count 4.16 3.8 - 5.2 10e12/L    Hemoglobin 12.1 11.7 - 15.7 g/dL    Hematocrit 36.6 35.0 - 47.0 %    MCV 88 78 - 100 fl    MCH 29.1 26.5 - 33.0 pg    MCHC 33.1 31.5 - 36.5 g/dL    RDW 13.5 10.0 - 15.0 %    Platelet Count 258 150 - 450 10e9/L    Diff Method Automated Method     % Neutrophils 73.9 %    % Lymphocytes 19.8 %    % Monocytes 5.1 %    % Eosinophils 0.8 %    % Basophils 0.2 %    % Immature Granulocytes 0.2 %    Nucleated RBCs 0 0 /100    Absolute Neutrophil 8.6 (H) 1.6 - 8.3 10e9/L    Absolute Lymphocytes 2.3 0.8 - 5.3 10e9/L    Absolute Monocytes 0.6 0.0 - 1.3 10e9/L    Absolute Eosinophils 0.1 0.0 - 0.7 10e9/L    Absolute Basophils 0.0 0.0 - 0.2 10e9/L    Abs Immature Granulocytes 0.0 0 - 0.4 10e9/L    Absolute Nucleated RBC 0.0    Basic metabolic panel   Result Value Ref Range    Sodium 138 133 - 144 mmol/L    Potassium 3.4 3.4 - 5.3 mmol/L    Chloride 103 94 - 109 mmol/L    Carbon Dioxide 27 20 - 32 mmol/L    Anion Gap 9 3 - 14 mmol/L    Glucose 94 70 - 99 mg/dL    Urea Nitrogen 6 (L) 7 - 30 mg/dL    Creatinine 0.58 0.52 - 1.04 mg/dL    GFR Estimate >90 >60 mL/min/1.7m2    GFR Estimate If Black >90 >60 mL/min/1.7m2    Calcium 9.1 8.5 - 10.1 mg/dL   Magnesium   Result Value Ref Range    Magnesium 1.8 1.6 - 2.3 mg/dL   Phosphorus   Result Value Ref Range    Phosphorus 3.7 2.5 - 4.5 mg/dL   GI LUMINAL ADULT IP CONSULT: Patient to be seen: Routine within 24 hrs; Call back #: [phone]; foreign body ingestion; Consultant may enter orders: Yes    Narrative    Dejon Marsh MD     7/31/2018  5:54 AM  Please see GI consult note for recommendations      Recent Results (from the past 48 hour(s))   XR Abdomen 1 View    Narrative    XR ABDOMEN 1 VW  7/30/2018 10:37 PM      HISTORY: eval for fb;     COMPARISON: 7/2/2018    FINDINGS: There is a spiral shaped radiopacity projecting over  the  stomach that likely represents a metallic spring. Nonobstructive bowel  gas pattern with no pneumatosis or portal venous gas. Elevation of the  right hemidiaphragm, dating back to at least January.      Impression    IMPRESSION: There is a spiral shaped radiopacity projecting over the  stomach that likely represents a metallic spring foreign body.    I have personally reviewed the examination and initial interpretation  and I agree with the findings.    TAMIKO VAZQUEZ MD                Pending Results:   Unresulted Labs Ordered in the Past 30 Days of this Admission     No orders found for last 61 day(s).                  Discharge Instructions and Follow-Up:     Discharge Procedure Orders  Reason for your hospital stay   Order Comments: Swallowed foreign body, GI removed foreign body, you were able to eat without difficulty, discharged to home.     [facility] Follow-up and recommended labs and tests   Order Comments: Follow up with primary care provider, Latonya Knight, within 7 days for hospital follow- up.      Appointments on [place] and/or [facility] (with [facility] or [facility] provider or service). Call [phone] if you haven't heard regarding these appointments within 7 days of discharge.     Activity   Order Comments: Your activity upon discharge: activity as tolerated and no driving for today   Order Specific Question Answer Comments   Is discharge order? Yes      When to contact your care team   Order Comments: Please avoid ingesting foreign bodies.  If you do please return to the ED.  Return to the ED for severe abdominal pain, vomiting of blood, black stools.     Transfer patient     Diet   Order Comments: Follow this diet upon discharge: Orders Placed This Encounter     Regular Diet Adult   Order Specific Question Answer Comments   Is discharge order? Yes             Attestation:  Liza Gold.  APRN, CNP

## 2018-07-31 NOTE — PROGRESS NOTES
Emergency Social Work Services Note    Date of  Intervention: 07/30/18  Last Emergency Department Visit:  7/28/18; several other ED visits in the month of July 2018  Care Plan:  Under consideration.  Collaborated with:  Jarek Santiago RN    Data:  Pt presents to the ED after swallowing a foreign body (spring from a pen).  Pt has multiple visits to the ED for similar events in the month of July 2018.      Intervention:  Chart reviewed.  Pt is still waiting in the lobby to get into the ED.  Writer unable to see pt due to lateness in the evening.  SW suggested to Jarek Santaigo RN, that pt have a DEC assessment for further mental health treatment recommendations as it seems pt's mental health symptoms/compulsions seems to be unable to be managed outside of the ED despite mental health case management and reported OP therapy.      Assessment:  Pt could benefit from a DEC assessment.    Plan:    Anticipated Disposition:  to be determined by ED team    Barriers to d/c plan:  In the lobby waiting to be seen for examination.    Follow Up:  On-call SW available until midnight, otherwise ED SW will be here again tomorrow at 9am.    DENIS Warner  Social Work Services  Emergency Department   [phone] phone  [phone] pager  On-call pager, [phone], 1600 to midnight

## 2018-07-31 NOTE — PROGRESS NOTES
Patient discharged home. PIV removed. Patient has all belongings, understands and agrees with discharge instructions.

## 2018-07-31 NOTE — ED PROVIDER NOTES
History     Chief Complaint   Patient presents with     Swallowed Foreign Body     HPI  Nevin Alvarado is a 27 year old female with history of foreign body ingestion, PTSD, anorexia nervosa with bulimia, and depressive disorder who presents to the ED after swallowing a spring from a pen. Patient states she ingestion the spring at 5 PM today, but denies this was an act of self harm. She states she has had an overwhelming compulsion to ingest foreign objects. She last ate food at 3 PM.  She is currently taking Latuda, Pristiq, and Clonidine and does have outpatient services including a psychiatrist. However, she is currently living by herself. She states that at the end of August, she will be moving to an apartment building with staff who are available to help people with mental health issues. She otherwise currently denies any nausea, vomiting, or bleeding.     PAST MEDICAL HISTORY  Past Medical History:   Diagnosis Date     ADD (attention deficit disorder)      Anorexia nervosa with bulimia     history of; on Topamax     Anxiety      Borderline personality disorder      Depression      Depressive disorder      H/O self-harm      Lives in independent group home     due to debilitating mental illness     Migraine without aura     no known triggers; on Topamax bid and Imitrex PRN     Morbid obesity (H)      PTSD (post-traumatic stress disorder)      Rectal foreign body - Recurrent issue, self placed      Swallowed foreign body - Recurrent issue, self placed      PAST SURGICAL HISTORY  Past Surgical History:   Procedure Laterality Date     ESOPHAGOSCOPY, GASTROSCOPY, DUODENOSCOPY (EGD), COMBINED N/A 3/9/2017    Procedure: COMBINED ESOPHAGOSCOPY, GASTROSCOPY, DUODENOSCOPY (EGD), REMOVE FOREIGN BODY;  Surgeon: Avis Guzmán MD;  Location:  OR     ESOPHAGOSCOPY, GASTROSCOPY, DUODENOSCOPY (EGD), COMBINED N/A 4/20/2017    Procedure: COMBINED ESOPHAGOSCOPY, GASTROSCOPY, DUODENOSCOPY (EGD), REMOVE  FOREIGN BODY;  EGD removal Foregin body;  Surgeon: Lokesh Paula MD;  Location: [facility]     ESOPHAGOSCOPY, GASTROSCOPY, DUODENOSCOPY (EGD), COMBINED N/A 6/12/2017    Procedure: COMBINED ESOPHAGOSCOPY, GASTROSCOPY, DUODENOSCOPY (EGD);  COMBINED ESOPHAGOSCOPY, GASTROSCOPY, DUODENOSCOPY (EGD) [[id number]]attempted removal of foreign body;  Surgeon: Pamela Perez MD;  Location: [facility]     ESOPHAGOSCOPY, GASTROSCOPY, DUODENOSCOPY (EGD), COMBINED N/A 6/9/2017    Procedure: COMBINED ESOPHAGOSCOPY, GASTROSCOPY, DUODENOSCOPY (EGD), REMOVE FOREIGN BODY;  Esophagoscopy, Gastroscopy, Duodenoscopy, Removal of Foreign Body;  Surgeon: Dejon Marsh MD;  [facility]     ESOPHAGOSCOPY, GASTROSCOPY, DUODENOSCOPY (EGD), COMBINED N/A 1/6/2018    Procedure: COMBINED ESOPHAGOSCOPY, GASTROSCOPY, DUODENOSCOPY (EGD), REMOVE FOREIGN BODY;  COMBINED ESOPHAGOSCOPY, GASTROSCOPY, DUODENOSCOPY (EGD) [by pascal net and snare with profol sedation;  Surgeon: Feliciano Emmanuel MD;  Location:  GI     ESOPHAGOSCOPY, GASTROSCOPY, DUODENOSCOPY (EGD), COMBINED N/A 3/19/2018    Procedure: COMBINED ESOPHAGOSCOPY, GASTROSCOPY, DUODENOSCOPY (EGD), REMOVE FOREIGN BODY;   Esophagodscopy, Gastroscopy, Duodenoscopy,Foreign Body Removal;  Surgeon: Brice Guzmán MD;  Location: [facility]     ESOPHAGOSCOPY, GASTROSCOPY, DUODENOSCOPY (EGD), COMBINED N/A 4/16/2018    Procedure: COMBINED ESOPHAGOSCOPY, GASTROSCOPY, DUODENOSCOPY (EGD), REMOVE FOREIGN BODY;  Esophagogastroduodenoscopy  Foreign Body Removal X 2;  Surgeon: Royer Moise MD;  [facility]     ESOPHAGOSCOPY, GASTROSCOPY, DUODENOSCOPY (EGD), COMBINED N/A 6/1/2018    Procedure: COMBINED ESOPHAGOSCOPY, GASTROSCOPY, DUODENOSCOPY (EGD), REMOVE FOREIGN BODY;  COMBINED ESOPHAGOSCOPY, GASTROSCOPY, DUODENOSCOPY with removal of foreign body, propofol sedation by anesthesia;  Surgeon: Brice Martinez MD;  Location:  GI     ESOPHAGOSCOPY, GASTROSCOPY,  DUODENOSCOPY (EGD), COMBINED N/A 7/25/2018    Procedure: COMBINED ESOPHAGOSCOPY, GASTROSCOPY, DUODENOSCOPY (EGD), REMOVE FOREIGN BODY;;  Surgeon: Candy Castelan MD;  Location: [facility]     ESOPHAGOSCOPY, GASTROSCOPY, DUODENOSCOPY (EGD), COMBINED N/A 7/28/2018    Procedure: COMBINED ESOPHAGOSCOPY, GASTROSCOPY, DUODENOSCOPY (EGD), REMOVE FOREIGN BODY;  COMBINED ESOPHAGOSCOPY, GASTROSCOPY, DUODENOSCOPY (EGD), REMOVE FOREIGN BODY;  Surgeon: Brice Guzmán MD;  [facility]     EXAM UNDER ANESTHESIA ANUS N/A 1/10/2017    Procedure: EXAM UNDER ANESTHESIA ANUS;  Surgeon: Annmarie Haynes MD;  [facility]     EXAM UNDER ANESTHESIA RECTUM N/A 7/19/2018    Procedure: EXAM UNDER ANESTHESIA RECTUM;  EXAM UNDER ANESTHESIA, REMOVAL OF RECTAL FOREIGN BODY;  Surgeon: Annmarie Haynes MD;  Location: [facility]     HC REMOVE FECAL IMPACTION OR FB W ANESTHESIA N/A 12/18/2016    Procedure: REMOVE FECAL IMPACTION/FOREIGN BODY UNDER ANESTHESIA;  Surgeon: Soham Cano MD;  Location: [facility] OR     KNEE SURGERY - removed a small tissue mass from knee Right      LAPAROSCOPIC ABLATION ENDOMETRIOSIS       LAPAROTOMY EXPLORATORY N/A 1/10/2017    Procedure: LAPAROTOMY EXPLORATORY;  Surgeon: Annmarie Haynes MD;  Location: [facility] OR     lymph nodes removed from neck; benign  age 7     MAMMOPLASTY REDUCTION Bilateral      RELEASE CARPAL TUNNEL Bilateral      SIGMOIDOSCOPY FLEXIBLE N/A 1/10/2017    Procedure: SIGMOIDOSCOPY FLEXIBLE;  Surgeon: Annmarie Haynes MD;  [facility]: [facility]     SIGMOIDOSCOPY FLEXIBLE N/A 5/8/2018    Procedure: SIGMOIDOSCOPY FLEXIBLE;  flex sig with foreign body removal using snare and rattooth forcep;  Surgeon: Soham Cano MD;  Location: [facility] GI     FAMILY HISTORY  Family History   Problem Relation Age of Onset     Type 2 Diabetes Maternal Grandmother      Type 2 Diabetes Paternal Grandmother      Breast Cancer Paternal Grandmother      Cerebrovascular Disease Father 53      "Myocardial Infarction No family hx of      Coronary Artery Disease Early Onset No family hx of      Ovarian Cancer No family hx of      Colon Cancer No family hx of      SOCIAL HISTORY  Social History   Substance Use Topics     Smoking status: Never Smoker     Smokeless tobacco: Never Used     Alcohol use No     MEDICATIONS  No current facility-administered medications for this encounter.      Current Outpatient Prescriptions   Medication     acetaminophen (TYLENOL) 325 MG tablet     Cholecalciferol (VITAMIN D3 PO)     CLONIDINE HCL PO     Desvenlafaxine Succinate (PRISTIQ PO)     levonorgestrel (MIRENA) 20 MCG/24HR IUD     lurasidone (LATUDA) 20 MG TABS tablet     oxyCODONE IR (ROXICODONE) 5 MG tablet     MELATONIN PO     ALLERGIES  Allergies   Allergen Reactions     Penicillins Anaphylaxis     Blood-Group Specific Substance Other (See Comments)     Patient has an anti-Cw and non-specific antibodies. Blood product orders may be delayed. Draw one red top and two purple top tubes for all type/screen/crossmatch orders.     Hydrocodone Nausea and Vomiting     vomiting for days     Influenza Vaccines Other (See Comments)     Oseltamivir Hives     med stopped, PN: med stopped     Vicodin [Hydrocodone-Acetaminophen] Nausea and Vomiting     Cephalosporins Rash     Lamotrigine Rash     Possibly associated with Lamictal.      Latex Rash       I have reviewed the Medications, Allergies, Past Medical and Surgical History, and Social History in the Epic system.    Review of Systems   All other systems reviewed and are negative.        Physical Exam   BP: (!) 141/96  Pulse: 85  Temp: 98.7  F (37.1  C)  Resp: 16  Height: 157.5 cm (5' 2\")  Weight: 112.9 kg (249 lb)  SpO2: 97 %      Physical Exam   Constitutional: She is oriented to person, place, and time. No distress.   HENT:   Head: Normocephalic and atraumatic.   Mouth/Throat: Oropharynx is clear and moist. No oropharyngeal exudate.   Eyes: Conjunctivae are normal. Pupils are " equal, round, and reactive to light.   Neck: Normal range of motion. Neck supple.   Cardiovascular: Normal rate and intact distal pulses.    Pulmonary/Chest: Effort normal. No respiratory distress. She has no wheezes. She has no rales.   Abdominal: There is no tenderness. There is no rebound and no guarding.   Musculoskeletal: Normal range of motion.   Neurological: She is alert and oriented to person, place, and time. She exhibits normal muscle tone.   Skin: Skin is warm and dry. She is not diaphoretic.   Psychiatric: She has a normal mood and affect. Her behavior is normal.   Nursing note and vitals reviewed.      ED Course     ED Course     Procedures   9:45 PM  The patient was seen and examined by Dr. Andrade  in [facility]                Critical Care time:  none             Labs Ordered and Resulted from Time of ED Arrival Up to the Time of Departure from the ED - No data to display         Assessments & Plan (with Medical Decision Making)   1. Intentional foreign body ingestion    27-year-old female with a history of borderline personality disorder who presents after an intentional foreign body ingestion.  An abdominal x-ray showed radiopacity over the stomach likely representing metallic spring from her pattern .  She was protecting her airway and had no vomiting.  Discussed the case with gastroenterology will plan on EGD in the morning.  While she denies any thoughts of self-harm she will have a sitter overnight.      I have reviewed the nursing notes.    I have reviewed the findings, diagnosis, plan and need for follow up with the patient.    New Prescriptions    No medications on file       Final diagnoses:   None     Yury MURRAY, am serving as a trained medical scribe to document services personally performed by Florin Andrade MD, based on the provider's statements to me.      Florin MURRAY MD, was physically present and have reviewed and verified the accuracy of this note documented by Yury  Liz.     7/30/2018   [facility], [place], EMERGENCY DEPARTMENT     Florin Andrade MD  07/31/18 013

## 2018-07-31 NOTE — ANESTHESIA POSTPROCEDURE EVALUATION
Patient: Nevin Alvarado    Procedure(s):  COMBINED ESOPHAGOSCOPY, GASTROSCOPY, DUODENOSCOPY (EGD) TO REMOVE FOREIGN BODY - Wound Class: II-Clean Contaminated    Diagnosis:Removal of foreign body  Diagnosis Additional Information: No value filed.    Anesthesia Type:  General, RSI, ETT    Note:  Anesthesia Post Evaluation    Patient location during evaluation: PACU  Patient participation: Able to fully participate in evaluation  Level of consciousness: awake and alert  Pain management: satisfactory to patient  Airway patency: patent  Cardiovascular status: acceptable  Respiratory status: acceptable  Hydration status: acceptable  PONV: controlled     Anesthetic complications: None          Last vitals:  Vitals:    07/31/18 0730 07/31/18 0745 07/31/18 0800   BP: 124/72 118/82 123/74   Pulse:      Resp: 15 15 13   Temp: 37.2  C (99  F) 37.2  C (99  F) 37.1  C (98.8  F)   SpO2: 97% 96% 95%         Electronically Signed By: Kelsie Cooley MD  July 31, 2018  8:12 AM

## 2018-08-03 ENCOUNTER — APPOINTMENT (OUTPATIENT)
Dept: GENERAL RADIOLOGY | Facility: CLINIC | Age: 28
End: 2018-08-03
Payer: MEDICARE

## 2018-08-03 ENCOUNTER — PATIENT OUTREACH (OUTPATIENT)
Dept: CARE COORDINATION | Facility: CLINIC | Age: 28
End: 2018-08-03

## 2018-08-03 ENCOUNTER — HOSPITAL ENCOUNTER (OUTPATIENT)
Facility: CLINIC | Age: 28
Setting detail: OBSERVATION
Discharge: HOME | End: 2018-08-04
Attending: EMERGENCY MEDICINE | Admitting: FAMILY MEDICINE
Payer: MEDICARE

## 2018-08-03 ENCOUNTER — TELEPHONE (OUTPATIENT)
Dept: INTERNAL MEDICINE | Facility: CLINIC | Age: 28
End: 2018-08-03

## 2018-08-03 DIAGNOSIS — T18.9XXA SWALLOWED FOREIGN BODY, INITIAL ENCOUNTER: ICD-10-CM

## 2018-08-03 DIAGNOSIS — F41.1 GENERALIZED ANXIETY DISORDER: ICD-10-CM

## 2018-08-03 LAB
AMPHETAMINES UR QL SCN: NEGATIVE
BARBITURATES UR QL: NEGATIVE
BENZODIAZ UR QL: NEGATIVE
CANNABINOIDS UR QL SCN: NEGATIVE
COCAINE UR QL: NEGATIVE
ETHANOL UR QL SCN: NEGATIVE
HCG UR QL: NEGATIVE
OPIATES UR QL SCN: NEGATIVE

## 2018-08-03 PROCEDURE — 74019 RADEX ABDOMEN 2 VIEWS: CPT

## 2018-08-03 PROCEDURE — 81025 URINE PREGNANCY TEST: CPT | Performed by: EMERGENCY MEDICINE

## 2018-08-03 PROCEDURE — 99285 EMERGENCY DEPT VISIT HI MDM: CPT | Mod: 25

## 2018-08-03 PROCEDURE — 80307 DRUG TEST PRSMV CHEM ANLYZR: CPT | Mod: 59 | Performed by: EMERGENCY MEDICINE

## 2018-08-03 PROCEDURE — 99285 EMERGENCY DEPT VISIT HI MDM: CPT | Mod: Z6 | Performed by: EMERGENCY MEDICINE

## 2018-08-03 PROCEDURE — 80320 DRUG SCREEN QUANTALCOHOLS: CPT | Performed by: EMERGENCY MEDICINE

## 2018-08-03 ASSESSMENT — ENCOUNTER SYMPTOMS
DIZZINESS: 0
SHORTNESS OF BREATH: 0
DYSPHORIC MOOD: 0
COUGH: 0
APPETITE CHANGE: 0
HEADACHES: 0
HALLUCINATIONS: 0
DYSURIA: 0
SLEEP DISTURBANCE: 1
NAUSEA: 0
FATIGUE: 0
DIARRHEA: 0
LIGHT-HEADEDNESS: 0
FEVER: 0
PALPITATIONS: 0
UNEXPECTED WEIGHT CHANGE: 0
CHILLS: 0
SORE THROAT: 0
ABDOMINAL PAIN: 0
VOMITING: 0
NERVOUS/ANXIOUS: 1
FREQUENCY: 0

## 2018-08-03 NOTE — TELEPHONE ENCOUNTER
Routing to care coordination as patient has mental health diagnosis associated with hospital visit.  Please see patient history.

## 2018-08-03 NOTE — IP AVS SNAPSHOT
[address] [address]    Phone:  [phone]                                       After Visit Summary   8/3/2018    Nevin Alvarado    MRN: [id number]           After Visit Summary Signature Page     I have received my discharge instructions, and my questions have been answered. I have discussed any challenges I see with this plan with the nurse or doctor.    ..........................................................................................................................................  Patient/Patient Representative Signature      ..........................................................................................................................................  Patient Representative Print Name and Relationship to Patient    ..................................................               ................................................  Date                                            Time    ..........................................................................................................................................  Reviewed by Signature/Title    ...................................................              ..............................................  Date                                                            Time

## 2018-08-03 NOTE — IP AVS SNAPSHOT
"                  MRN:[id number]                      After Visit Summary   8/3/2018    Nevin Alvarado    MRN: [id number]           Thank you!     Thank you for choosing [facility] for your care. Our goal is always to provide you with excellent care. Hearing back from our patients is one way we can continue to improve our services. Please take a few minutes to complete the written survey that you may receive in the mail after you visit with us. Thank you!        Patient Information     Date Of Birth          1990        About your hospital stay     You were admitted on:  August 4, 2018 You last received care in the:  [address] [facility]    You were discharged on:  August 4, 2018        Reason for your hospital stay       You were admitted to Emergency Dept Observation for monitoring overnight of your abdominal symptoms after ingestion of the spring of a pen. You did well overnight with a sitter at the bedside and were evaluated by psychiatry and gastroenterology this morning. Recommend future treatment for foreign objects be done within the [facility] system for better coordination of care. You tolerated removal of the spring well. You would like to eat before going home and have asked for a cab ride to home.                  Who to Call     For medical emergencies, please call 911.  For non-urgent questions about your medical care, please call your primary care provider or clinic, [phone]  For questions related to your surgery, please call your surgery clinic        Attending Provider     Provider Specialty    Alyssa Menon MD Emergency Medicine    Aquilino Ragsdale MD Emergency Medicine       Primary Care Provider Office Phone # Fax #    Latonya Knight [phone]       When to contact your care team       Call 911 if any of the following occur:   \" Chest pain or shortness of breath  \" Vomiting blood (red or black)  \" Blood in your stool (dark red or black color)                "   After Care Instructions     Activity       Your activity upon discharge: activity as tolerated            Diet       Follow this diet upon discharge:       Regular Diet Adult            Discharge Instructions       -Keep your psychiatry appt on Thursday, Aug 9 with Dr Brionna De La Rosa  -Call Dr De La Rosa on Monday and let her know about about this admission, the 4th foreign body retrieval in 7-10 days, in case she wants to adjust any medications (Possibly the latuda or clonidine, per Dr Martinez).  -Call your , Becca Neves, and see if you can get more frequent home visits and support.  -Keep your appointment with your therapist at [facility] on Wednesday, Aug 8.  -To better manage your anxiety, stay busy at home with crafts, television, or even going shopping.  -If you have urges to swallow things or insert them into your rectum, call [facility] Crisis Line                  Follow-up Appointments     Adult [facility] Follow-up and recommended labs and tests       Keep your psychiatry appointment on Aug 9 with Dr Brionna De La Rosa.    Call Dr De La Rosa on Monday and let her know about about this admission, the 4th foreign body retrieval in 7-10 days, in case she wants to adjust any medications.    Follow up with primary care provider, Latonya Knight, within 7 days for hospital follow- up.        Appointments on [place] and/or [facility] (with [facility] or [facility] provider or service). Call [phone] if you haven't heard regarding these appointments within 7 days of discharge.                  Further instructions from your care team       You have an appointment scheduled with your psychiatrist at Dr. Brionna De La Rosa at [facility] located at [address] [facility] [address], [place]  [address]:    08/09/2018: Clinic was unable to verify time; please call [phone] if you are unaware of appointment time.    Pending Results     No orders found for last 3 day(s).     "        Statement of Approval     Ordered          08/04/18 1812  I have reviewed and agree with all the recommendations and orders detailed in this document.  EFFECTIVE NOW     Approved and electronically signed by:  Verónica Frausto CNP             Admission Information     Date & Time Provider Department Dept. Phone    8/3/2018 Aquilino Ragsdale MD [address] [facility] [phone]      Your Vitals Were     Blood Pressure Pulse Temperature Respirations Height Weight    128/67 (BP Location: Right arm) 75 96.7  F (35.9  C) (Oral) 14 1.575 m (5' 2\") 112.8 kg (248 lb 9.6 oz)    Pulse Oximetry BMI (Body Mass Index)                95% 45.47 kg/m2          MyChart Information     [employer] gives you secure access to your electronic health record. If you see a primary care provider, you can also send messages to your care team and make appointments. If you have questions, please call your primary care clinic.  If you do not have a primary care provider, please call [phone] and they will assist you.        Care EveryWhere ID     This is your Care EveryWhere ID. This could be used by other organizations to access your [place] medical records  [id number]        Equal Access to Services     ZENON DIAMOND : Hadii zaire Collado, waaxda luget, qaybta kaalmada jona, mic persaud. So [facility] [phone].    ATENCIÓN: Si habla español, tiene a singh disposición servicios gratuitos de asistencia lingüística. Dylon al [phone].    We comply with applicable federal civil rights laws and [place] laws. We do not discriminate on the basis of race, color, national origin, age, disability, sex, sexual orientation, or gender identity.               Review of your medicines      CONTINUE these medicines which have NOT CHANGED        Dose / Directions    acetaminophen 325 MG tablet   Commonly known as:  TYLENOL   Used for:  Rectal foreign body, initial encounter        Dose:  650 " mg   Take 2 tablets (650 mg) by mouth every 8 hours   Quantity:  10 tablet   Refills:  0       BENADRYL PO        Dose:  25-50 mg   Take 25-50 mg by mouth nightly as needed   Refills:  0       CLONIDINE HCL PO        Dose:  0.05 mg   Take 0.05 mg by mouth 2 times daily   Refills:  0       levonorgestrel 20 MCG/24HR IUD   Commonly known as:  MIRENA        Dose:  1 each   1 each (20 mcg) by Intrauterine route once for 1 dose   Refills:  0       lurasidone 20 MG Tabs tablet   Commonly known as:  LATUDA   Used for:  Borderline personality disorder, Severe episode of recurrent major depressive disorder, without psychotic features (H)        Dose:  40 mg   Take 2 tablets (40 mg) by mouth every evening   Quantity:  12 tablet   Refills:  0       MELATONIN PO        Dose:  3 mg   Take 3 mg by mouth nightly as needed (sleep)   Refills:  0       omeprazole 40 MG capsule   Commonly known as:  priLOSEC   Used for:  Swallowed foreign body, initial encounter        Dose:  40 mg   Take 1 capsule (40 mg) by mouth daily Take 30-60 minutes before a meal.   Quantity:  30 capsule   Refills:  0       PRISTIQ PO        Dose:  100 mg   Take 100 mg by mouth every morning   Refills:  0       VITAMIN D3 PO        Dose:  2000 Units   Take 2,000 Units by mouth every morning   Refills:  0                Protect others around you: Learn how to safely use, store and throw away your medicines at www.disposemymeds.org.             Medication List: This is a list of all your medications and when to take them. Check marks below indicate your daily home schedule. Keep this list as a reference.      Medications           Morning Afternoon Evening Bedtime As Needed    acetaminophen 325 MG tablet   Commonly known as:  TYLENOL   Take 2 tablets (650 mg) by mouth every 8 hours   Last time this was given:  1,000 mg on 8/4/2018  1:39 PM                                BENADRYL PO   Take 25-50 mg by mouth nightly as needed                                 CLONIDINE HCL PO   Take 0.05 mg by mouth 2 times daily                                levonorgestrel 20 MCG/24HR IUD   Commonly known as:  MIRENA   1 each (20 mcg) by Intrauterine route once for 1 dose                                lurasidone 20 MG Tabs tablet   Commonly known as:  LATUDA   Take 2 tablets (40 mg) by mouth every evening                                MELATONIN PO   Take 3 mg by mouth nightly as needed (sleep)                                omeprazole 40 MG capsule   Commonly known as:  priLOSEC   Take 1 capsule (40 mg) by mouth daily Take 30-60 minutes before a meal.                                PRISTIQ PO   Take 100 mg by mouth every morning   Last time this was given:  100 mg on 8/4/2018 10:28 AM                                VITAMIN D3 PO   Take 2,000 Units by mouth every morning

## 2018-08-03 NOTE — ED AVS SNAPSHOT
[facility], Emergency Department    [address]    [facility] [address]    Phone:  [phone]    Fax:  [phone]                                       Nevin Alvarado   MRN: [id number]    Department:  [facility], Emergency Department   Date of Visit:  8/3/2018           After Visit Summary Signature Page     I have received my discharge instructions, and my questions have been answered. I have discussed any challenges I see with this plan with the nurse or doctor.    ..........................................................................................................................................  Patient/Patient Representative Signature      ..........................................................................................................................................  Patient Representative Print Name and Relationship to Patient    ..................................................               ................................................  Date                                            Time    ..........................................................................................................................................  Reviewed by Signature/Title    ...................................................              ..............................................  Date                                                            Time

## 2018-08-03 NOTE — ED AVS SNAPSHOT
[facility], Emergency Department    [address]    [place] [address]    Phone:  [phone]    Fax:  [phone]                                       Nevin Alvarado   MRN: [id number]    Department:  [facility], Emergency Department   Date of Visit:  8/3/2018           Patient Information     Date Of Birth          1990        Your diagnoses for this visit were:     Swallowed foreign body, initial encounter     Generalized anxiety disorder        You were seen by Alyssa Menon MD.        Discharge Instructions       Continue working with your current mental health providers.     Continue taking your current medications.     Please check you stool to see if the spring has passed.     Please have a repeat xray in 2-3 days to see where the spring is located, unless you have already passed it in your stool.    Seek medical attention if safety concerns/worsening pain, concerns.     24 Hour Appointment Hotline       To make an appointment at any [facility] clinic, call [phone]). If you don't have a family doctor or clinic, we will help you find one. [facility] clinics are conveniently located to serve the needs of you and your family.             Review of your medicines      Our records show that you are taking the medicines listed below. If these are incorrect, please call your family doctor or clinic.        Dose / Directions Last dose taken    acetaminophen 325 MG tablet   Commonly known as:  TYLENOL   Dose:  650 mg   Quantity:  10 tablet        Take 2 tablets (650 mg) by mouth every 8 hours   Refills:  0        BENADRYL PO   Dose:  25-50 mg        Take 25-50 mg by mouth nightly as needed   Refills:  0        CLONIDINE HCL PO   Dose:  0.05 mg        Take 0.05 mg by mouth 2 times daily   Refills:  0        levonorgestrel 20 MCG/24HR IUD   Commonly known as:  MIRENA   Dose:  1 each        1 each (20 mcg) by Intrauterine route once for 1 dose   Refills:  0         lurasidone 20 MG Tabs tablet   Commonly known as:  LATUDA   Dose:  40 mg   Quantity:  12 tablet        Take 2 tablets (40 mg) by mouth every evening   Refills:  0        MELATONIN PO   Dose:  3 mg        Take 3 mg by mouth nightly as needed (sleep)   Refills:  0        omeprazole 40 MG capsule   Commonly known as:  priLOSEC   Dose:  40 mg   Quantity:  30 capsule        Take 1 capsule (40 mg) by mouth daily Take 30-60 minutes before a meal.   Refills:  0        PRISTIQ PO   Dose:  100 mg        Take 100 mg by mouth every morning   Refills:  0        VITAMIN D3 PO   Dose:  2000 Units        Take 2,000 Units by mouth every morning   Refills:  0                Procedures and tests performed during your visit     Abdomen XR, 2 vw, flat and upright    Drug abuse screen 6 urine (tox)    HCG qualitative urine (UPT)      Orders Needing Specimen Collection     None      Pending Results     Date and Time Order Name Status Description    8/3/2018 1826 Abdomen XR, 2 vw, flat and upright Preliminary             Pending Culture Results     No orders found from 8/1/2018 to 8/4/2018.            Pending Results Instructions     If you had any lab results that were not finalized at the time of your Discharge, you can call the ED Lab Result RN at [phone]. You will be contacted by this team for any positive Lab results or changes in treatment. The nurses are available 7 days a week from 10A to 6:30P.  You can leave a message 24 hours per day and they will return your call.        Thank you for choosing [facility]       Thank you for choosing [facility] for your care. Our goal is always to provide you with excellent care. Hearing back from our patients is one way we can continue to improve our services. Please take a few minutes to complete the written survey that you may receive in the mail after you visit with us. Thank you!        [employer] Information     [employer] gives you secure access to your electronic health record. If you see a  primary care provider, you can also send messages to your care team and make appointments. If you have questions, please call your primary care clinic.  If you do not have a primary care provider, please call [phone] and they will assist you.        Care EveryWhere ID     This is your Care EveryWhere ID. This could be used by other organizations to access your [facility] records  [id number]        Equal Access to Services     ZENON DIAMOND : Gabrile Collado, erick singh, taylor kaalmagregory tenorio, mic persaud. So [facility] [phone].    ATENCIÓN: Si habla español, tiene a singh disposición servicios gratuitos de asistencia lingüística. Llame al [phone].    We comply with applicable federal civil rights laws and [place] laws. We do not discriminate on the basis of race, color, national origin, age, disability, sex, sexual orientation, or gender identity.            After Visit Summary       This is your record. Keep this with you and show to your community pharmacist(s) and doctor(s) at your next visit.

## 2018-08-03 NOTE — ED PROVIDER NOTES
History     Chief Complaint   Patient presents with     Swallowed Foreign Body     has been having a lot anxiety due to moving to another apartment. Per pt she usually swallows foreign objects when she is anxious. At around 4 PM pt swallowed a pen spring. Denies it was a suicidal attempt.      HPI  Nevin Alvarado is a 27 year old female, history of MDD, ADD, PTSD, borderline personality disorder, history of multiple ED visits following ingestion/rectal insertion - last discharged 07/31/2018 - presents after swallowing pen spring at 4 p.m today. Trigger for act was increasing anxiety due to recent move. Currently managing anxiety with clonidine twice daily and outpatient behavioural tx with new therapist. Denies suicide ideation. Denies abdominal pain, fever, nausea, vomiting, diarrhea. No other physical complaints currently. Anxiety currently improved; mood stable.    Patient currently working with 2 case workers, therapist, primary care, and is moving into new group home with full staffing support.     I have reviewed the Medications, Allergies, Past Medical and Surgical History, and Social History in the Epic system.    Review of Systems   Constitutional: Negative for appetite change, chills, fatigue, fever and unexpected weight change.   HENT: Negative for sore throat.    Eyes: Negative for visual disturbance.   Respiratory: Negative for cough and shortness of breath.    Cardiovascular: Negative for chest pain and palpitations.   Gastrointestinal: Negative for abdominal pain, diarrhea, nausea and vomiting.   Genitourinary: Negative for dysuria and frequency.   Neurological: Negative for dizziness, light-headedness and headaches.   Psychiatric/Behavioral: Positive for sleep disturbance. Negative for dysphoric mood, hallucinations, self-injury and suicidal ideas. The patient is nervous/anxious.        Physical Exam   BP: (!) 156/93  Pulse: 102  Temp: 99  F (37.2  C)  Resp: 16  SpO2: 97 %      Physical Exam    Constitutional: She is oriented to person, place, and time. She appears well-developed and well-nourished. No distress.   HENT:   Head: Normocephalic and atraumatic.   Eyes: Conjunctivae and EOM are normal.   Neck: Normal range of motion.   Cardiovascular: Normal rate and regular rhythm.    Pulmonary/Chest: Effort normal and breath sounds normal. No respiratory distress.   Abdominal: Soft. Bowel sounds are normal. There is no tenderness.   Neurological: She is alert and oriented to person, place, and time.   Skin: She is not diaphoretic.   Psychiatric: She has a normal mood and affect. Her speech is normal and behavior is normal. Judgment and thought content normal. Her mood appears not anxious. She is not agitated and not hyperactive. Cognition and memory are normal. She does not exhibit a depressed mood. She expresses no suicidal ideation.   Vitals reviewed.      ED Course     ED Course     Procedures               Labs Ordered and Resulted from Time of ED Arrival Up to the Time of Departure from the ED - No data to display         Assessments & Plan (with Medical Decision Making)   26 yo F with MDD, ADD, PTSD, borderline personality disorder, multiple prior ingestion/rectal insertion, with return visit to ED for ingestion of pen spring at 4 p.m today due to increasing anxiety from her move. Currently mood stable, anxiety improved. Physical exam was normal; patient in no distress, not agitated, thought content and speech is normal, no suicide or homicidal ideation.     AXR revealed pen spring in gastric antrum, no free intraperitoneal air.   GI consulted regarding endoscopy for foreign body removal - did not feel this was urgent and in the setting of multiple prior EGDs this week, felt that risks outweigh the benefits of removal, and the ingested object was of low likelihood to perforate. Recommended repeat xray in abdomen to see if it has passed out of the stomach.    Patient following with multiple providers  for anxiety and other psychiatric conditions, including 2 case workers, therapist, primary care practitioner, and is in transition to group home with full staffing support. Discussed GI recommendations with patient. At this time, decision was made to admit to ED observation.      I have reviewed the nursing notes.    I have reviewed the findings, diagnosis, plan and need for follow up with the patient.    New Prescriptions    No medications on file       Final diagnoses:   None       8/3/2018   [facility], [place], EMERGENCY DEPARTMENT    I spoke with GI fellow, Dr. Lin pager #[phone].  They would like to admit to ED observation unit, get a repeat abdominal xray in the am and see if the spring has moved out of the stomach.  They would also like to involve psychiatry and discuss future planning, given this issue occurs frequently.  She remains stable without airway compromise.  1:1 sitter was assigned.   No si/hi.       Alyssa Menon MD  08/04/18 0054       Alyssa Menon MD  08/04/18 0055

## 2018-08-03 NOTE — PROGRESS NOTES
Clinic Care Coordination Contact      Situation: Patient chart reviewed by care coordinator.      Background: MH pt is managed through [facility], has MH CM, Psychiatry, therapy in place. Pt only utilizes [facility] ED. PCP with Harry      Assessment: Chronic/Persistantly MI      Plan/Recommendations: Not open to [facility] services. Not a [facility] pt.       LEATHA Mayers  Care Coordinator  [phone]  [email or web address]

## 2018-08-04 ENCOUNTER — ANESTHESIA EVENT (OUTPATIENT)
Dept: SURGERY | Facility: CLINIC | Age: 28
End: 2018-08-04
Payer: MEDICARE

## 2018-08-04 ENCOUNTER — APPOINTMENT (OUTPATIENT)
Dept: GENERAL RADIOLOGY | Facility: CLINIC | Age: 28
End: 2018-08-04
Payer: MEDICARE

## 2018-08-04 ENCOUNTER — ANESTHESIA (OUTPATIENT)
Dept: SURGERY | Facility: CLINIC | Age: 28
End: 2018-08-04
Payer: MEDICARE

## 2018-08-04 ENCOUNTER — SURGERY (OUTPATIENT)
Age: 28
End: 2018-08-04

## 2018-08-04 VITALS
BODY MASS INDEX: 45.75 KG/M2 | TEMPERATURE: 96.7 F | HEIGHT: 62 IN | OXYGEN SATURATION: 96 % | DIASTOLIC BLOOD PRESSURE: 72 MMHG | SYSTOLIC BLOOD PRESSURE: 128 MMHG | WEIGHT: 248.6 LBS | RESPIRATION RATE: 13 BRPM | HEART RATE: 70 BPM

## 2018-08-04 LAB
ALBUMIN SERPL-MCNC: 3.2 G/DL (ref 3.4–5)
ALP SERPL-CCNC: 77 U/L (ref 40–150)
ALT SERPL W P-5'-P-CCNC: 26 U/L (ref 0–50)
ANION GAP SERPL CALCULATED.3IONS-SCNC: 7 MMOL/L (ref 3–14)
AST SERPL W P-5'-P-CCNC: 13 U/L (ref 0–45)
BASOPHILS # BLD AUTO: 0 10E9/L (ref 0–0.2)
BASOPHILS NFR BLD AUTO: 0.4 %
BILIRUB SERPL-MCNC: 0.2 MG/DL (ref 0.2–1.3)
BUN SERPL-MCNC: 7 MG/DL (ref 7–30)
CALCIUM SERPL-MCNC: 8.4 MG/DL (ref 8.5–10.1)
CHLORIDE SERPL-SCNC: 108 MMOL/L (ref 94–109)
CO2 SERPL-SCNC: 26 MMOL/L (ref 20–32)
CREAT SERPL-MCNC: 0.55 MG/DL (ref 0.52–1.04)
DIFFERENTIAL METHOD BLD: ABNORMAL
EOSINOPHIL # BLD AUTO: 0.2 10E9/L (ref 0–0.7)
EOSINOPHIL NFR BLD AUTO: 2.4 %
ERYTHROCYTE [DISTWIDTH] IN BLOOD BY AUTOMATED COUNT: 13.7 % (ref 10–15)
GFR SERPL CREATININE-BSD FRML MDRD: >90 ML/MIN/1.7M2
GLUCOSE SERPL-MCNC: 98 MG/DL (ref 70–99)
HCT VFR BLD AUTO: 33.8 % (ref 35–47)
HGB BLD-MCNC: 10.9 G/DL (ref 11.7–15.7)
IMM GRANULOCYTES # BLD: 0 10E9/L (ref 0–0.4)
IMM GRANULOCYTES NFR BLD: 0.1 %
INR PPP: 1.13 (ref 0.86–1.14)
LYMPHOCYTES # BLD AUTO: 2.2 10E9/L (ref 0.8–5.3)
LYMPHOCYTES NFR BLD AUTO: 29.6 %
MAGNESIUM SERPL-MCNC: 2.1 MG/DL (ref 1.6–2.3)
MCH RBC QN AUTO: 28.6 PG (ref 26.5–33)
MCHC RBC AUTO-ENTMCNC: 32.2 G/DL (ref 31.5–36.5)
MCV RBC AUTO: 89 FL (ref 78–100)
MONOCYTES # BLD AUTO: 0.4 10E9/L (ref 0–1.3)
MONOCYTES NFR BLD AUTO: 5 %
NEUTROPHILS # BLD AUTO: 4.6 10E9/L (ref 1.6–8.3)
NEUTROPHILS NFR BLD AUTO: 62.5 %
NRBC # BLD AUTO: 0 10*3/UL
NRBC BLD AUTO-RTO: 0 /100
PLATELET # BLD AUTO: 243 10E9/L (ref 150–450)
POTASSIUM SERPL-SCNC: 3.4 MMOL/L (ref 3.4–5.3)
PROT SERPL-MCNC: 6.3 G/DL (ref 6.8–8.8)
RBC # BLD AUTO: 3.81 10E12/L (ref 3.8–5.2)
SODIUM SERPL-SCNC: 140 MMOL/L (ref 133–144)
UPPER GI ENDOSCOPY: NORMAL
WBC # BLD AUTO: 7.4 10E9/L (ref 4–11)

## 2018-08-04 PROCEDURE — 83735 ASSAY OF MAGNESIUM: CPT | Performed by: PHYSICIAN ASSISTANT

## 2018-08-04 PROCEDURE — 36000051 ZZH SURGERY LEVEL 2 1ST 30 MIN - UMMC: Performed by: INTERNAL MEDICINE

## 2018-08-04 PROCEDURE — 25000128 H RX IP 250 OP 636

## 2018-08-04 PROCEDURE — 85610 PROTHROMBIN TIME: CPT | Performed by: PHYSICIAN ASSISTANT

## 2018-08-04 PROCEDURE — 25000125 ZZHC RX 250: Performed by: PHYSICIAN ASSISTANT

## 2018-08-04 PROCEDURE — 36415 COLL VENOUS BLD VENIPUNCTURE: CPT | Performed by: PHYSICIAN ASSISTANT

## 2018-08-04 PROCEDURE — 99214 OFFICE O/P EST MOD 30 MIN: CPT

## 2018-08-04 PROCEDURE — 25000565 ZZH ISOFLURANE, EA 15 MIN: Performed by: INTERNAL MEDICINE

## 2018-08-04 PROCEDURE — 74018 RADEX ABDOMEN 1 VIEW: CPT

## 2018-08-04 PROCEDURE — 25000132 ZZH RX MED GY IP 250 OP 250 PS 637: Mod: GY | Performed by: PHYSICIAN ASSISTANT

## 2018-08-04 PROCEDURE — 25000128 H RX IP 250 OP 636: Performed by: NURSE ANESTHETIST, CERTIFIED REGISTERED

## 2018-08-04 PROCEDURE — A9270 NON-COVERED ITEM OR SERVICE: HCPCS | Mod: GY | Performed by: PHYSICIAN ASSISTANT

## 2018-08-04 PROCEDURE — 80053 COMPREHEN METABOLIC PANEL: CPT | Performed by: PHYSICIAN ASSISTANT

## 2018-08-04 PROCEDURE — 25000128 H RX IP 250 OP 636: Performed by: PHYSICIAN ASSISTANT

## 2018-08-04 PROCEDURE — 99220 ZZC INITIAL OBSERVATION CARE,LEVL III: CPT | Mod: Z6 | Performed by: PHYSICIAN ASSISTANT

## 2018-08-04 PROCEDURE — 96374 THER/PROPH/DIAG INJ IV PUSH: CPT

## 2018-08-04 PROCEDURE — 71000015 ZZH RECOVERY PHASE 1 LEVEL 2 EA ADDTL HR: Performed by: INTERNAL MEDICINE

## 2018-08-04 PROCEDURE — 25000125 ZZHC RX 250: Performed by: NURSE ANESTHETIST, CERTIFIED REGISTERED

## 2018-08-04 PROCEDURE — 27210794 ZZH OR GENERAL SUPPLY STERILE: Performed by: INTERNAL MEDICINE

## 2018-08-04 PROCEDURE — 40000170 ZZH STATISTIC PRE-PROCEDURE ASSESSMENT II: Performed by: INTERNAL MEDICINE

## 2018-08-04 PROCEDURE — 37000008 ZZH ANESTHESIA TECHNICAL FEE, 1ST 30 MIN: Performed by: INTERNAL MEDICINE

## 2018-08-04 PROCEDURE — G0378 HOSPITAL OBSERVATION PER HR: HCPCS

## 2018-08-04 PROCEDURE — 71000014 ZZH RECOVERY PHASE 1 LEVEL 2 FIRST HR: Performed by: INTERNAL MEDICINE

## 2018-08-04 PROCEDURE — 85025 COMPLETE CBC W/AUTO DIFF WBC: CPT | Performed by: PHYSICIAN ASSISTANT

## 2018-08-04 PROCEDURE — 40000556 ZZH STATISTIC PERIPHERAL IV START W US GUIDANCE

## 2018-08-04 RX ORDER — ONDANSETRON 2 MG/ML
INJECTION INTRAMUSCULAR; INTRAVENOUS PRN
Status: DISCONTINUED | OUTPATIENT
Start: 2018-08-04 | End: 2018-08-04

## 2018-08-04 RX ORDER — POTASSIUM CL/LIDO/0.9 % NACL 10MEQ/0.1L
10 INTRAVENOUS SOLUTION, PIGGYBACK (ML) INTRAVENOUS
Status: DISCONTINUED | OUTPATIENT
Start: 2018-08-04 | End: 2018-08-04 | Stop reason: RX

## 2018-08-04 RX ORDER — MAGNESIUM SULFATE HEPTAHYDRATE 40 MG/ML
2 INJECTION, SOLUTION INTRAVENOUS DAILY PRN
Status: DISCONTINUED | OUTPATIENT
Start: 2018-08-04 | End: 2018-08-04

## 2018-08-04 RX ORDER — MAGNESIUM SULFATE HEPTAHYDRATE 40 MG/ML
4 INJECTION, SOLUTION INTRAVENOUS EVERY 4 HOURS PRN
Status: DISCONTINUED | OUTPATIENT
Start: 2018-08-04 | End: 2018-08-04

## 2018-08-04 RX ORDER — NALOXONE HYDROCHLORIDE 0.4 MG/ML
.1-.4 INJECTION, SOLUTION INTRAMUSCULAR; INTRAVENOUS; SUBCUTANEOUS
Status: DISCONTINUED | OUTPATIENT
Start: 2018-08-04 | End: 2018-08-04 | Stop reason: HOSPADM

## 2018-08-04 RX ORDER — POTASSIUM CHLORIDE 1.5 G/1.58G
20-40 POWDER, FOR SOLUTION ORAL
Status: DISCONTINUED | OUTPATIENT
Start: 2018-08-04 | End: 2018-08-04

## 2018-08-04 RX ORDER — SODIUM CHLORIDE 9 MG/ML
INJECTION, SOLUTION INTRAVENOUS CONTINUOUS
Status: DISCONTINUED | OUTPATIENT
Start: 2018-08-04 | End: 2018-08-04

## 2018-08-04 RX ORDER — DESVENLAFAXINE 50 MG/1
100 TABLET, FILM COATED, EXTENDED RELEASE ORAL EVERY MORNING
Status: DISCONTINUED | OUTPATIENT
Start: 2018-08-04 | End: 2018-08-04 | Stop reason: HOSPADM

## 2018-08-04 RX ORDER — ONDANSETRON 2 MG/ML
4 INJECTION INTRAMUSCULAR; INTRAVENOUS EVERY 30 MIN PRN
Status: DISCONTINUED | OUTPATIENT
Start: 2018-08-04 | End: 2018-08-04 | Stop reason: HOSPADM

## 2018-08-04 RX ORDER — FENTANYL CITRATE 50 UG/ML
25-50 INJECTION, SOLUTION INTRAMUSCULAR; INTRAVENOUS
Status: DISCONTINUED | OUTPATIENT
Start: 2018-08-04 | End: 2018-08-04 | Stop reason: HOSPADM

## 2018-08-04 RX ORDER — SODIUM CHLORIDE, SODIUM LACTATE, POTASSIUM CHLORIDE, CALCIUM CHLORIDE 600; 310; 30; 20 MG/100ML; MG/100ML; MG/100ML; MG/100ML
INJECTION, SOLUTION INTRAVENOUS CONTINUOUS
Status: DISCONTINUED | OUTPATIENT
Start: 2018-08-04 | End: 2018-08-04 | Stop reason: HOSPADM

## 2018-08-04 RX ORDER — LIDOCAINE HYDROCHLORIDE 20 MG/ML
INJECTION, SOLUTION INFILTRATION; PERINEURAL PRN
Status: DISCONTINUED | OUTPATIENT
Start: 2018-08-04 | End: 2018-08-04

## 2018-08-04 RX ORDER — ACETAMINOPHEN 500 MG
1000 TABLET ORAL EVERY 6 HOURS PRN
Status: DISCONTINUED | OUTPATIENT
Start: 2018-08-04 | End: 2018-08-04 | Stop reason: HOSPADM

## 2018-08-04 RX ORDER — LIDOCAINE 40 MG/G
CREAM TOPICAL
Status: DISCONTINUED | OUTPATIENT
Start: 2018-08-04 | End: 2018-08-04 | Stop reason: HOSPADM

## 2018-08-04 RX ORDER — HYDROMORPHONE HYDROCHLORIDE 1 MG/ML
.3-.5 INJECTION, SOLUTION INTRAMUSCULAR; INTRAVENOUS; SUBCUTANEOUS EVERY 5 MIN PRN
Status: DISCONTINUED | OUTPATIENT
Start: 2018-08-04 | End: 2018-08-04 | Stop reason: HOSPADM

## 2018-08-04 RX ORDER — POTASSIUM CHLORIDE 750 MG/1
20-40 TABLET, EXTENDED RELEASE ORAL
Status: DISCONTINUED | OUTPATIENT
Start: 2018-08-04 | End: 2018-08-04

## 2018-08-04 RX ORDER — POTASSIUM CHLORIDE 7.45 MG/ML
10 INJECTION INTRAVENOUS
Status: DISCONTINUED | OUTPATIENT
Start: 2018-08-04 | End: 2018-08-04

## 2018-08-04 RX ORDER — LANOLIN ALCOHOL/MO/W.PET/CERES
3 CREAM (GRAM) TOPICAL
Status: DISCONTINUED | OUTPATIENT
Start: 2018-08-04 | End: 2018-08-04 | Stop reason: HOSPADM

## 2018-08-04 RX ORDER — SODIUM CHLORIDE 9 MG/ML
INJECTION, SOLUTION INTRAVENOUS CONTINUOUS PRN
Status: DISCONTINUED | OUTPATIENT
Start: 2018-08-04 | End: 2018-08-04

## 2018-08-04 RX ORDER — LURASIDONE HYDROCHLORIDE 40 MG/1
40 TABLET, FILM COATED ORAL EVERY EVENING
Status: DISCONTINUED | OUTPATIENT
Start: 2018-08-04 | End: 2018-08-04 | Stop reason: HOSPADM

## 2018-08-04 RX ORDER — ONDANSETRON 2 MG/ML
4 INJECTION INTRAMUSCULAR; INTRAVENOUS EVERY 6 HOURS PRN
Status: DISCONTINUED | OUTPATIENT
Start: 2018-08-04 | End: 2018-08-04 | Stop reason: HOSPADM

## 2018-08-04 RX ORDER — POTASSIUM CHLORIDE 29.8 MG/ML
20 INJECTION INTRAVENOUS
Status: DISCONTINUED | OUTPATIENT
Start: 2018-08-04 | End: 2018-08-04

## 2018-08-04 RX ORDER — PROPOFOL 10 MG/ML
INJECTION, EMULSION INTRAVENOUS PRN
Status: DISCONTINUED | OUTPATIENT
Start: 2018-08-04 | End: 2018-08-04

## 2018-08-04 RX ORDER — CLONIDINE HYDROCHLORIDE 0.1 MG/1
0.05 TABLET ORAL 2 TIMES DAILY
Status: DISCONTINUED | OUTPATIENT
Start: 2018-08-04 | End: 2018-08-04 | Stop reason: HOSPADM

## 2018-08-04 RX ORDER — ONDANSETRON 4 MG/1
4 TABLET, ORALLY DISINTEGRATING ORAL EVERY 30 MIN PRN
Status: DISCONTINUED | OUTPATIENT
Start: 2018-08-04 | End: 2018-08-04 | Stop reason: HOSPADM

## 2018-08-04 RX ORDER — DIPHENHYDRAMINE HCL 25 MG
25-50 CAPSULE ORAL
Status: DISCONTINUED | OUTPATIENT
Start: 2018-08-04 | End: 2018-08-04 | Stop reason: HOSPADM

## 2018-08-04 RX ORDER — ONDANSETRON 4 MG/1
4 TABLET, ORALLY DISINTEGRATING ORAL EVERY 6 HOURS PRN
Status: DISCONTINUED | OUTPATIENT
Start: 2018-08-04 | End: 2018-08-04 | Stop reason: HOSPADM

## 2018-08-04 RX ORDER — FENTANYL CITRATE 50 UG/ML
INJECTION, SOLUTION INTRAMUSCULAR; INTRAVENOUS PRN
Status: DISCONTINUED | OUTPATIENT
Start: 2018-08-04 | End: 2018-08-04

## 2018-08-04 RX ORDER — FLUMAZENIL 0.1 MG/ML
0.2 INJECTION, SOLUTION INTRAVENOUS
Status: DISCONTINUED | OUTPATIENT
Start: 2018-08-04 | End: 2018-08-04 | Stop reason: HOSPADM

## 2018-08-04 RX ORDER — AMOXICILLIN 250 MG
1 CAPSULE ORAL
Status: DISCONTINUED | OUTPATIENT
Start: 2018-08-04 | End: 2018-08-04 | Stop reason: HOSPADM

## 2018-08-04 RX ADMIN — FENTANYL CITRATE 25 MCG: 50 INJECTION INTRAMUSCULAR; INTRAVENOUS at 15:06

## 2018-08-04 RX ADMIN — ACETAMINOPHEN 1000 MG: 500 TABLET, FILM COATED ORAL at 13:39

## 2018-08-04 RX ADMIN — SODIUM CHLORIDE: 9 INJECTION, SOLUTION INTRAVENOUS at 05:51

## 2018-08-04 RX ADMIN — Medication 100 MG: at 14:33

## 2018-08-04 RX ADMIN — PROCHLORPERAZINE EDISYLATE 5 MG: 5 INJECTION INTRAMUSCULAR; INTRAVENOUS at 15:22

## 2018-08-04 RX ADMIN — FENTANYL CITRATE 25 MCG: 50 INJECTION INTRAMUSCULAR; INTRAVENOUS at 15:25

## 2018-08-04 RX ADMIN — Medication 0.3 MG: at 15:35

## 2018-08-04 RX ADMIN — PANTOPRAZOLE SODIUM 40 MG: 40 INJECTION, POWDER, FOR SOLUTION INTRAVENOUS at 10:29

## 2018-08-04 RX ADMIN — ACETAMINOPHEN 1000 MG: 500 TABLET, FILM COATED ORAL at 05:24

## 2018-08-04 RX ADMIN — FENTANYL CITRATE 50 MCG: 50 INJECTION INTRAMUSCULAR; INTRAVENOUS at 15:11

## 2018-08-04 RX ADMIN — DESVENLAFAXINE SUCCINATE 100 MG: 50 TABLET, EXTENDED RELEASE ORAL at 10:28

## 2018-08-04 RX ADMIN — PROPOFOL 200 MG: 10 INJECTION, EMULSION INTRAVENOUS at 14:33

## 2018-08-04 RX ADMIN — LIDOCAINE HYDROCHLORIDE 100 MG: 20 INJECTION, SOLUTION INFILTRATION; PERINEURAL at 14:33

## 2018-08-04 RX ADMIN — SODIUM CHLORIDE: 9 INJECTION, SOLUTION INTRAVENOUS at 14:25

## 2018-08-04 RX ADMIN — ONDANSETRON 4 MG: 2 INJECTION INTRAMUSCULAR; INTRAVENOUS at 14:50

## 2018-08-04 RX ADMIN — FENTANYL CITRATE 100 MCG: 50 INJECTION, SOLUTION INTRAMUSCULAR; INTRAVENOUS at 14:25

## 2018-08-04 RX ADMIN — CLONIDINE HYDROCHLORIDE 0.05 MG: 0.1 TABLET ORAL at 10:29

## 2018-08-04 NOTE — PROCEDURES
Gastroenterology Endoscopy Suite Brief Operative Note    Procedure:  Upper endoscopy   Post-operative diagnosis:  Upper endoscopy with foreign body removal   Staff Physician:  Dr. Lokesh Paula   Fellow/Assistant(s):  HENRIQUE    Specimens:  Please see final procedure note for further details.   Findings:  Pen spring in stomach - removed with rat tooth forceps   Complications:  None.   Condition:  Stable   Recommendations  Diet:  Return to previous diet  PPI:  PPI po once daily  Anti-coagulants/platelets:  N/A  Octreotide:  N/A  Discharge Planning:   Can discharge per ED observation team

## 2018-08-04 NOTE — CONSULTS
Consult Date:  08/04/2018      PSYCHIATRIC CONSULTATION       REASON FOR CONSULTATION:  The medicine service requested a consultation to evaluate this patient with significant psychiatric issues, multiple foreign body ingestions, who presents with another foreign body ingestion.      HISTORY OF PRESENT ILLNESS:  The patient is a 27-year-old female with a complicated psychiatric history of borderline personality disorder, major depressive disorder, PTSD, ADD, multiple overdoses, a history of cutting and foreign body ingestion and insertions who presented to the ER after swallowing the spring out of a pen.  At 4 p.m. of the day prior to admission, she decided to swallow a spring inside a pen.  Per her discussion with me, she stated that she was anxious and had not been keeping herself busy enough and decided to swallow this spring.  She stated that she unwound the spring because she likes the sensation of this going down her throat and that she did this because this helps her with her anxiety.  The patient denies any desire to harm herself or any suicidal ideation.  She states she uses swallowing as a means of coping with stress.  She in the past has swallowed things like mickie pins and stated that she tries to swallow things that will not be particularly harmful, but that need to be removed so per her report to me, she has not swallowed any particularly sharp objects or large objects, things of that sort.  These behaviors have gone on for some time.        Her recent medical and psychiatric records have been reviewed.  Per an H and P of Dr. Hernandez from 03/2018, she describes the patient as having had at that time at least 11 EGDs, 3 exploratory laps, 2  other procedures, has had 4 overdoses in the last 2 years.  At the time she saw her, the patient was described as not following with a psychiatric clinician at that time.  The patient was hospitalized briefly.  On discharge, the patient was felt not to be at imminent  risk of self-harm and appropriate for outpatient level of care.  The patient has also been seen more recently by Dinora Randall and Xiomara These consults were reviewed.      Currently, the patient denies depression.  She denies suicidal ideation.  She states she is looking forward to moving to a new apartment, but finds this somewhat anxiety provoking.  She states the last time she had any suicidal ideation was a number of months ago.  She describes the circumstances around her swallowing is that this tends to occur when she does not keep herself busy and she starts thinking about the stressors of moving.      The patient has a psychiatrist, Brionna De La Rosa, at [facility] and she has an appointment with her on the 9th.  She also has a therapist at [facility].  She has an appointment on Wednesday.  The patient has a  through [facility] Que[facility], and a [facility] worker, Hollie De La Rosa, who she is involved with and help her manage her care.  She states she sees her ILS worker twice a week, has a nurse come in 1 time a week.  Her medications are in a box with a mechanism to alert her and open it at the appropriate time to remind her when to take her medications.      The patient states she has been on clonidine 0.5 b.i.d., was on a higher dose, it made her sleepy.  Has been on Latuda 40 mg.  She has been on this medication combination for about 5 months.  She also is on Pristiq 100 mg, has been on this for what she describes as about 2 years.  She states she is tolerating her medications well and in general her anxiety and depression are well controlled until she gets into these episodes of thinking about moving, which cause her to be anxious and lead to the swallowing behaviors.      I did discuss a plan for managing anxiety with the patient at this time.  We discussed her staying busy.  She likes to watch TV, work crafts, go shopping.  She states she can do these things.  She can also call  the crisis line at [facility] and agrees to do that if she has any urges to swallow.  She also agrees to call her  and try to get more support and more visits by her workers, and I have suggested that she call her psychiatrist on Monday to discuss medication changes.  She may benefit from a higher dose of Latuda or clonidine but I do not see an urgent need to do that, but would like her to discuss this with her psychiatrist who knows her and can help her make a decision about changing her medications at this time.      I discussed the plan with the patient.  She understands the need to comply with this and understands that she will be at risk for potential self-harm.  She understands the risks of swallowing including infection, perforation, the risks of general anesthesia, the possibility of death.  The patient denies any substance dependence or substance use and states that she is not using and would never use substances.      PAST PSYCHIATRIC HISTORY:  As above, multiple psychiatric hospitalizations, multiple presentations to EDs with foreign body ingestion without admission and has been given diagnoses of MDD, borderline personality disorder, PTSD and anxiety, ADD .      CHEMICAL DEPENDENCY HISTORY:  The patient denies.      FAMILY HISTORY:  Significant for anxiety and depression in her mother and sister.      SOCIAL HISTORY:  The patient currently is on disability.  She lives in an apartment by herself.  She has services as described above.  She has a relationship with her mother and sister, although she states it is not an option for her to live with them.  She left high school in the middle of her senior year.  She does have multiple resources and supports as discussed above.      PAST MEDICAL HISTORY:  Significant for migraine without aura, morbid obesity, rectal foreign body placement, swallowing foreign bodies with removal, history of overdoses.      REVIEW OF SYSTEMS:  A 10-point review of  systems was performed.  Currently, the patient denies chest pain, shortness of breath.  Denies any GI symptoms.  Denies nausea, vomiting, fevers, chills, localizing neurological symptoms.  The rest of 10-point review of systems is negative.      VITAL SIGNS:  Temperature 97.2, respirations 16, pulse 71, blood pressure 126/62.      MEDICATIONS:  Reviewed per [facility].     MENTAL STATUS  MILAGRO - cooperative in no acute distress  M/A - denies depression, some mild anxiety, affect mobile  Tp/Assoc - goal directed, no DANYELL or FTD  Tc - Denies S-I, intenr to ingest, psychotic sx  Speech/Language - appropriate  Attention/Concentration - intact  Orientation - A&O x3  Recent and Remote Memory - intact  Fund of Knowledge - average  J/I - adequate,appropriate  Muscle bulk and tone - normal  Abnormal movements - none     IMPRESSION:  The patient is a 27-year-old female with a long complicated psychiatric history, a history of swallowing objects, overdose and other self-harm.  Currently, she appears to swallow as a means to cope with anxiety.  She is in treatment with a psychiatrist, a therapist and has community support services.  Currently, she denies any impulses to swallow objects or to harm herself.  She states that her mood and anxiety levels are good right now.  She denies marked depression, denies anxiety.  Her anxiety appears to be episodic.        We went over a plan as described above for outpatient management.  I believe the patient is appropriate for outpatient management and is not at imminent risk for self-harm at this time.  She understands the risk of swallowing, of noncompliance with the treatment plan as outlined above and has resources including the ability to called [facility] immediately.  She will be contacting her  and her psychiatrist on Monday.      DSM DIAGNOSES:  Borderline personality disorder, major depressive disorder, posttraumatic stress disorder, anxiety unspecified, attention  deficit disorder (per history).      TREATMENT RECOMMENDATIONS:   1.  I believe it is appropriate to discharge the patient for outpatient followup as described above.   2.  I will not make any changes in her medication at this time as she is in treatment and will be discussing this with her psychiatrist.  I do not think a change in medication would help issues acutely.   3.  Case discussed with Verónica Frausto, Nurse Practitioner, from the observation service.   4.  Please call or reconsult with any questions, concerns or change in the patient's status.  My number is [phone].         SHARON ALMANZA MD             D: 2018   T: 2018   MT: MONA      Name:     ABAD TONG   MRN:      [id number]        Account:       [id number]   :      1990           Consult Date:  2018      Document: [id number]       cc: LOUISA ELMORE MD

## 2018-08-04 NOTE — PROGRESS NOTES
Observation Unit Progress Note    Date of Encounter: 8-4-18    Observation Staff Physician: Gustavo Quintero MD    Reason for Admission: Ingested Foreign Body      Observation Goals:  1. Serial abdominal exams, monitor foreign body and possible EGD extraction (third time in a week)  2. Psychiatry evaluation, concern for DC and repeating cycle of ingestion and subsequent EGD. Anxiety worsening.       Plan of care for shift:  1. Serial abdominal exams- LUQ pain is stable. Patient is sleeping.  2. GI to see- Consideration for EGD but concern for pt seeking secondary gain. Not urgent or even emergent to remove this object, per GI. Spoke with fellow, Dr Lin, and she would like to wait for Psychiatry to eval pt prior to making plan. DC without removal and pt may just go home and ingest something more dangerous. Sedated x3 in one week already.   3. KUB this am shows spring of pen remains in body of stomach.   4. 1:1 Sitter at bedside.   5. Pt to remain NPO     Disposition:  1. Pending GI and Psychiatry consults.     Exam:  Physical Exam   Constitutional: oriented to person, place, and time. appears well-developed and well-nourished.   HENT:   Head: Normocephalic and atraumatic.   Neck: Normal range of motion.   Pulmonary/Chest: Effort normal. No respiratory distress.   Abdomen: mild LUQ tenderness  Neurological: alert and oriented to person, place, and time.   Skin: Skin is warm and dry.   Psychiatric:  Pt answers questions clearly. Woke from sleep, brief questions and one word answers to questions.       Pertinent Labs/tests:  KUB completed. Spring from pen remains in body of stomach.  Remains afebrile.   WBC 7.4 this am      HU Willett, CNP  Emergency Dept Observation Unit

## 2018-08-04 NOTE — ANESTHESIA POSTPROCEDURE EVALUATION
Patient: Nevin Alvarado    Procedure(s):   combined esophagoscopy, gastroscopy, duodenoscopy, REMOVE FOREIGN BODY  - Wound Class: II-Clean Contaminated    Diagnosis:Foreign body   Diagnosis Additional Information: No value filed.    Anesthesia Type:  General, RSI, ETT    Note:  Anesthesia Post Evaluation    Patient location during evaluation: PACU  Patient participation: Able to fully participate in evaluation  Level of consciousness: awake  Pain management: adequate  Airway patency: patent  Cardiovascular status: acceptable  Respiratory status: acceptable  Hydration status: acceptable  PONV: none             Last vitals:  Vitals:    08/04/18 1215 08/04/18 1453 08/04/18 1500   BP: 103/53 111/60 117/74   Pulse: 73     Resp: 18 12 16   Temp: 36.1  C (96.9  F) 36.7  C (98  F)    SpO2: 97%           Electronically Signed By: Xavier Francisco MD  August 4, 2018  3:50 PM

## 2018-08-04 NOTE — ANESTHESIA PREPROCEDURE EVALUATION
Anesthesia Evaluation     . Pt has had prior anesthetic. Type: General and MAC           ROS/MED HX    ENT/Pulmonary:  - neg pulmonary ROS     Neurologic:  - neg neurologic ROS     Cardiovascular:     (+) ----. : . . fainting (syncope). :. .       METS/Exercise Tolerance:  >4 METS   Hematologic:         Musculoskeletal:         GI/Hepatic:         Renal/Genitourinary:  - ROS Renal section negative       Endo:  - neg endo ROS   (+) Obesity, .      Psychiatric:     (+) psychiatric history anxiety and depression      Infectious Disease:  - neg infectious disease ROS       Malignancy:      - no malignancy   Other:    (+) No chance of pregnancy C-spine cleared: N/A, no H/O Chronic Pain,no other significant disability   - neg other ROS                 Physical Exam  Normal systems: pulmonary and dental    Airway   Mallampati: II    Dental     Cardiovascular   Rhythm and rate: regular and normal      Pulmonary                     Anesthesia Plan      History & Physical Review  History and physical reviewed and following examination; no interval change.    ASA Status:  2 emergent.        Plan for General, RSI and ETT with Intravenous and Propofol induction.   PONV prophylaxis:  Ondansetron (or other 5HT-3) and Promethazine or metoclopramide       Postoperative Care  Postoperative pain management:  IV analgesics.      Consents  Anesthetic plan, risks, benefits and alternatives discussed with:  Patient..                          .

## 2018-08-04 NOTE — ED NOTES
[facility], [place]   ED Nurse to Floor Handoff     Nevin Alvarado is a 27 year old female who speaks English and lives alone,  in a home  They arrived in the ED by car from home    ED Chief Complaint: Swallowed Foreign Body (has been having a lot anxiety due to moving to another apartment. Per pt she usually swallows foreign objects when she is anxious. At around 4 PM pt swallowed a pen spring. Denies it was a suicidal attempt. )    ED Dx;   Final diagnoses:   Swallowed foreign body, initial encounter   Generalized anxiety disorder         Needed?: No    Allergies:   Allergies   Allergen Reactions     Penicillins Anaphylaxis     Blood-Group Specific Substance Other (See Comments)     Patient has an anti-Cw and non-specific antibodies. Blood product orders may be delayed. Draw one red top and two purple top tubes for all type/screen/crossmatch orders.     Hydrocodone Nausea and Vomiting     vomiting for days     Influenza Vaccines Other (See Comments)     Oseltamivir Hives     med stopped, PN: med stopped     Vicodin [Hydrocodone-Acetaminophen] Nausea and Vomiting     Cephalosporins Rash     Lamotrigine Rash     Possibly associated with Lamictal.      Latex Rash   .  Past Medical Hx:   Past Medical History:   Diagnosis Date     ADD (attention deficit disorder)      Anorexia nervosa with bulimia     history of; on Topamax     Anxiety      Borderline personality disorder      Depression      Depressive disorder      H/O self-harm      Lives in independent group home     due to debilitating mental illness     Migraine without aura     no known triggers; on Topamax bid and Imitrex PRN     Morbid obesity (H)      PTSD (post-traumatic stress disorder)      Rectal foreign body - Recurrent issue, self placed      Swallowed foreign body - Recurrent issue, self placed       Baseline Mental status: WDL  Current Mental Status changes: at basesline    Infection present or suspected  this encounter: no  Sepsis suspected: No  Isolation type: No active isolations     Activity level - Baseline/Home:  Independent  Activity Level - Current:   Independent    Bariatric equipment needed?: No    In the ED these meds were given: Medications - No data to display    Drips running?  No    Home pump  No    Current LDAs       Labs results:   Labs Ordered and Resulted from Time of ED Arrival Up to the Time of Departure from the ED   DRUG ABUSE SCREEN 6 CHEM DEP URINE ([facility])   HCG QUALITATIVE URINE       Imaging Studies:   Recent Results (from the past 24 hour(s))   Abdomen XR, 2 vw, flat and upright    Narrative    ABDOMEN TWO VIEWS  8/3/2018 7:34 PM     COMPARISON: Frontal view of the abdomen 7/30/2018.    HISTORY: Swallowed pen spring.      Impression    IMPRESSION: Swallowed foreign body (pen spring) remains in the gastric  antrum. Abdominal bowel gas pattern is nonspecific. There is no  evidence for free intraperitoneal air.    PHU PATEL MD       Recent vital signs:   /80  Pulse 95  Temp 98.7  F (37.1  C) (Oral)  Resp 16  SpO2 100%    Cardiac Rhythm: Other  Pt needs tele? No  Skin/wound Issues: None    Code Status: none on file    Pain control: pt had none    Nausea control: pt had none    Abnormal labs/tests/findings requiring intervention: none    Family present during ED course? No   Family Comments/Social Situation comments: none    Tasks needing completion: None    Tea Whittington RN  [facility]-- [phone] [place] [facility]  [phone] [facility]

## 2018-08-04 NOTE — PROGRESS NOTES
OBSERVATION GOALS    1.  diagnostic tests and consults completed and resulted - No.   2. vital signs normal or at patient baseline- Yes, VSS.   3. tollerating oral intake to maintain hydration- No. NPO status.  Avis sips of water with medications.  4. adequate pain control on oral analgesics - Denies pain.  5. returns to baseline functional status - unknown  6. safe disposition plan has been identified- unknown   7. GI consult completed with safe dispo - No  8. Psych consult completed with safe dispo -No  9. Nurse to notify provider when observation goals have been met and patient is ready for discharge- not done, goals are not complete

## 2018-08-04 NOTE — ED NOTES
Observation Brochure and Video    Patient informed of observation status based on provider's order.  Observation brochure was given and the video watched. Patient/Family stated understanding. Questions answered.  Tea Whittington

## 2018-08-04 NOTE — PLAN OF CARE
Problem: Behavior Regulation (Impulse Control) Impairment (Nonsuicidal Self-injury) (Adult)  Goal: Improved Behavior Regulation and Impulse Control (Nonsuicidal Self-injury)  Outcome: No Change  Observation goals PRIOR TO DISCHARGE     Comments: -diagnostic tests and consults completed and resulted - No repeat abdominal x-ray ordered  -vital signs normal or at patient baseline - Yes  -tolerating oral intake to maintain hydration  - No, patient currently NPO  -adequate pain control on oral analgesics - Yes, denies pain  -returns to baseline functional status - Yes  -safe disposition plan has been identified - No, unsure of discharge disposition at this time  -GI consult completed with safe dispo - No, GI consult ordered  -Psych consult completed with safe dispo - No   Nurse to notify provider when observation goals have been met and patient is ready for discharge.

## 2018-08-04 NOTE — CONSULTS
GASTROENTEROLOGY CONSULTATION      Date of Admission:  8/3/2018           Reason for Consultation:   We were asked by Dr. Ragsdale  of ED to evaluate this patient with recurrent foreign body ingestion          ASSESSMENT AND RECOMMENDATIONS:   Assessment:  27 year old female with a history of  ADD, MDD, PTSD, borderline personality disorder, multiple episodes of foreign body ingestion over the last year, presents to ED after swallowed a pen spring.    # Recurrent foreign body ingestion: the 3rd episode this week. Reports mild abdominal pain but not bothering her. She is hungry and wants to eat. She denied nausea/vomiting, hematemesis, severe abdominal pain and rebound tenderness. Vital sign stable. Per pt, foreign body ingestion somehow help her relive anxiety.   KUB 8/3, 8/4 showed a <6 cm long, < 5 mm wide pen spring, which is a non sharp object in stomach. The location and features of the object did not fit criteria for emergent or urgent endoscopic retrieval. Pt is at high risk of recurrent foreign body ingestion and repeat EGD is unlikely to be the cure.     Recommendations  - Psychiatry consult. GI recommendation will coordinate with psychiatry evaluation   - Keep 1:1 sitter to avoid further foreign body ingestion   - Keep NPO   - IV fluid while NPO   - Watch for sign of peritonitis or increasing abdominal pain   - GI will continue to follow.     Thank you for involving us in this patient's care. Please do not hesitate to contact the GI service with any questions or concerns.     Pt care plan discussed with Dr. Paula, GI staff physician.    Ada Lin  -------------------------------------------------------------------------------------------------------------------           History of Present Illness:   Nevin Alvarado is a 27 year old female with a history of ADD, MDD, PTSD, borderline personality disorder, multiple episodes of foreign body ingestion over the last year, presents to ED after  swallowed a pen spring. Per patient, she swallow foreign object because it helps relive her anxiety. She understand ingestion could cause damage to her GI tract, so she has been bending the object and avoid sharps to minimize GI injury. She has been followed by psychiatry at [facility]SH. She was started on anxiety treatment (clonidine BID and behavioral tx) a week ago, noted inconsistent improvement of her symptoms. As for now, she denies anxiety, nausea, vomiting, hematemesis and severe abdominal pain. Reports some abdominal discomfort but states it does not bother her. She is hungry and wants to eat.     Per chart, pt had 3 ED visit for foreign body ingestion over the last week, and 12 EGD and 1 flex sig done for foreign body removal over the last year.           Past Medical History:   Reviewed and edited as appropriate  Past Medical History:   Diagnosis Date     ADD (attention deficit disorder)      Anorexia nervosa with bulimia     history of; on Topamax     Anxiety      Borderline personality disorder      Depression      Depressive disorder      H/O self-harm      Lives in independent group home     due to debilitating mental illness     Migraine without aura     no known triggers; on Topamax bid and Imitrex PRN     Morbid obesity (H)      PTSD (post-traumatic stress disorder)      Rectal foreign body - Recurrent issue, self placed      Swallowed foreign body - Recurrent issue, self placed             Past Surgical History:   Reviewed and edited as appropriate   Past Surgical History:   Procedure Laterality Date     ESOPHAGOSCOPY, GASTROSCOPY, DUODENOSCOPY (EGD), COMBINED N/A 3/9/2017    Procedure: COMBINED ESOPHAGOSCOPY, GASTROSCOPY, DUODENOSCOPY (EGD), REMOVE FOREIGN BODY;  Surgeon: Avis Guzmán MD;  Location: [facility] OR     ESOPHAGOSCOPY, GASTROSCOPY, DUODENOSCOPY (EGD), COMBINED N/A 4/20/2017    Procedure: COMBINED ESOPHAGOSCOPY, GASTROSCOPY, DUODENOSCOPY (EGD), REMOVE FOREIGN BODY;  EGD removal Foregin  body;  Surgeon: Lokesh Paula MD;  Location: [facility] OR     ESOPHAGOSCOPY, GASTROSCOPY, DUODENOSCOPY (EGD), COMBINED N/A 6/12/2017    Procedure: COMBINED ESOPHAGOSCOPY, GASTROSCOPY, DUODENOSCOPY (EGD);  COMBINED ESOPHAGOSCOPY, GASTROSCOPY, DUODENOSCOPY (EGD) [[id number]]attempted removal of foreign body;  Surgeon: Pamela Perez MD;  Location: [facility] OR     ESOPHAGOSCOPY, GASTROSCOPY, DUODENOSCOPY (EGD), COMBINED N/A 6/9/2017    Procedure: COMBINED ESOPHAGOSCOPY, GASTROSCOPY, DUODENOSCOPY (EGD), REMOVE FOREIGN BODY;  Esophagoscopy, Gastroscopy, Duodenoscopy, Removal of Foreign Body;  Surgeon: Dejon Marsh MD;  Location: [facility] OR     ESOPHAGOSCOPY, GASTROSCOPY, DUODENOSCOPY (EGD), COMBINED N/A 1/6/2018    Procedure: COMBINED ESOPHAGOSCOPY, GASTROSCOPY, DUODENOSCOPY (EGD), REMOVE FOREIGN BODY;  COMBINED ESOPHAGOSCOPY, GASTROSCOPY, DUODENOSCOPY (EGD) [by pascal net and snare with profol sedation;  Surgeon: Feliciano Emmanuel MD;  Location:  GI     ESOPHAGOSCOPY, GASTROSCOPY, DUODENOSCOPY (EGD), COMBINED N/A 3/19/2018    Procedure: COMBINED ESOPHAGOSCOPY, GASTROSCOPY, DUODENOSCOPY (EGD), REMOVE FOREIGN BODY;   Esophagodscopy, Gastroscopy, Duodenoscopy,Foreign Body Removal;  Surgeon: Brice Guzmán MD;  Location: [facility]     ESOPHAGOSCOPY, GASTROSCOPY, DUODENOSCOPY (EGD), COMBINED N/A 4/16/2018    Procedure: COMBINED ESOPHAGOSCOPY, GASTROSCOPY, DUODENOSCOPY (EGD), REMOVE FOREIGN BODY;  Esophagogastroduodenoscopy  Foreign Body Removal X 2;  Surgeon: Royer Moise MD;  Location: [facility]     ESOPHAGOSCOPY, GASTROSCOPY, DUODENOSCOPY (EGD), COMBINED N/A 6/1/2018    Procedure: COMBINED ESOPHAGOSCOPY, GASTROSCOPY, DUODENOSCOPY (EGD), REMOVE FOREIGN BODY;  COMBINED ESOPHAGOSCOPY, GASTROSCOPY, DUODENOSCOPY with removal of foreign body, propofol sedation by anesthesia;  Surgeon: Brice Martinez MD;  Location:  GI     ESOPHAGOSCOPY, GASTROSCOPY, DUODENOSCOPY (EGD), COMBINED N/A 7/25/2018     Procedure: COMBINED ESOPHAGOSCOPY, GASTROSCOPY, DUODENOSCOPY (EGD), REMOVE FOREIGN BODY;;  Surgeon: Candy Castelan MD;  Location: [facility]     ESOPHAGOSCOPY, GASTROSCOPY, DUODENOSCOPY (EGD), COMBINED N/A 7/28/2018    Procedure: COMBINED ESOPHAGOSCOPY, GASTROSCOPY, DUODENOSCOPY (EGD), REMOVE FOREIGN BODY;  COMBINED ESOPHAGOSCOPY, GASTROSCOPY, DUODENOSCOPY (EGD), REMOVE FOREIGN BODY;  Surgeon: Brice Guzmán MD;  Location: [facility]     ESOPHAGOSCOPY, GASTROSCOPY, DUODENOSCOPY (EGD), COMBINED N/A 7/31/2018    Procedure: COMBINED ESOPHAGOSCOPY, GASTROSCOPY, DUODENOSCOPY (EGD);  COMBINED ESOPHAGOSCOPY, GASTROSCOPY, DUODENOSCOPY (EGD) TO REMOVE FOREIGN BODY;  Surgeon: Lokesh Paula MD;  Location: [facility]     EXAM UNDER ANESTHESIA ANUS N/A 1/10/2017    Procedure: EXAM UNDER ANESTHESIA ANUS;  Surgeon: Annmarie Haynes MD;  Location: [facility] OR     EXAM UNDER ANESTHESIA RECTUM N/A 7/19/2018    Procedure: EXAM UNDER ANESTHESIA RECTUM;  EXAM UNDER ANESTHESIA, REMOVAL OF RECTAL FOREIGN BODY;  Surgeon: Annmarie Haynes MD;  Location: [facility] OR     HC REMOVE FECAL IMPACTION OR FB W ANESTHESIA N/A 12/18/2016    Procedure: REMOVE FECAL IMPACTION/FOREIGN BODY UNDER ANESTHESIA;  Surgeon: Soham Cano MD;  Location: [facility] OR     KNEE SURGERY - removed a small tissue mass from knee Right      LAPAROSCOPIC ABLATION ENDOMETRIOSIS       LAPAROTOMY EXPLORATORY N/A 1/10/2017    Procedure: LAPAROTOMY EXPLORATORY;  Surgeon: Annmarie Haynes MD;  Location: [facility] OR     lymph nodes removed from neck; benign  age 7     MAMMOPLASTY REDUCTION Bilateral      RELEASE CARPAL TUNNEL Bilateral      SIGMOIDOSCOPY FLEXIBLE N/A 1/10/2017    Procedure: SIGMOIDOSCOPY FLEXIBLE;  Surgeon: Annmarie Haynes MD;  Location: [facility]     SIGMOIDOSCOPY FLEXIBLE N/A 5/8/2018    Procedure: SIGMOIDOSCOPY FLEXIBLE;  flex sig with foreign body removal using snare and rattooth forcep;  Surgeon: Soham Cano MD;  Location:  GI             Previous Endoscopy:   No results found for this or any previous visit.         Social History:   Reviewed and edited as appropriate  Social History     Social History     Marital status: Single     Spouse name: N/A     Number of children: N/A     Years of education: N/A     Occupational History     On disability      Social History Main Topics     Smoking status: Never Smoker     Smokeless tobacco: Never Used     Alcohol use No     Drug use: No     Sexual activity: Yes     Partners: Male     Birth control/ protection: IUD      Comment: IUD - Mirena - placed July, 2015     Other Topics Concern     Not on file     Social History Narrative    Single.    Living in independent living portion of [employer] [facility].    On disability.    No regular exercise.             Family History:   Reviewed and edited as appropriate  Family History   Problem Relation Age of Onset     Type 2 Diabetes Maternal Grandmother      Type 2 Diabetes Paternal Grandmother      Breast Cancer Paternal Grandmother      Cerebrovascular Disease Father 53     Myocardial Infarction No family hx of      Coronary Artery Disease Early Onset No family hx of      Ovarian Cancer No family hx of      Colon Cancer No family hx of      No known history of colorectal cancer, liver disease, or inflammatory bowel disease.       Allergies:   Reviewed and edited as appropriate     Allergies   Allergen Reactions     Penicillins Anaphylaxis     Blood-Group Specific Substance Other (See Comments)     Patient has an anti-Cw and non-specific antibodies. Blood product orders may be delayed. Draw one red top and two purple top tubes for all type/screen/crossmatch orders.     Hydrocodone Nausea and Vomiting     vomiting for days     Influenza Vaccines Other (See Comments)     Oseltamivir Hives     med stopped, PN: med stopped     Vicodin [Hydrocodone-Acetaminophen] Nausea and Vomiting     Cephalosporins Rash     Lamotrigine Rash     Possibly associated with  "Lamictal.      Latex Rash            Medications:     Current Facility-Administered Medications   Medication     acetaminophen (TYLENOL) tablet 1,000 mg     cloNIDine (CATAPRES) half-tab 0.05 mg     desvenlafaxine succinate (PRISTIQ) 24 hr tablet 100 mg     diphenhydrAMINE (BENADRYL) capsule 25-50 mg     lurasidone (LATUDA) tablet 40 mg     magnesium sulfate 2 g in water intermittent infusion     magnesium sulfate 4 g in 100 mL sterile water (premade)     melatonin tablet 1 mg     melatonin tablet 3 mg     naloxone (NARCAN) injection 0.1-0.4 mg     ondansetron (ZOFRAN-ODT) ODT tab 4 mg    Or     ondansetron (ZOFRAN) injection 4 mg     pantoprazole (PROTONIX) 40 mg IV push injection     potassium chloride (KLOR-CON) Packet 20-40 mEq     potassium chloride 10 mEq in 100 mL sterile water intermittent infusion (premix)     potassium chloride 20 mEq in 50 mL intermittent infusion     potassium chloride SA (K-DUR/KLOR-CON M) CR tablet 20-40 mEq     senna-docusate (SENOKOT-S;PERICOLACE) 8.6-50 MG per tablet 1 tablet     sodium chloride 0.9% infusion             Review of Systems:   A complete review of systems was performed and is negative except as noted in the HPI           Physical Exam:   /62 (BP Location: Left arm)  Pulse 71  Temp 97.2  F (36.2  C) (Oral)  Resp 16  Ht 1.575 m (5' 2\")  Wt 112.8 kg (248 lb 9.6 oz)  SpO2 97%  BMI 45.47 kg/m2  Wt:   Wt Readings from Last 2 Encounters:   08/04/18 112.8 kg (248 lb 9.6 oz)   07/30/18 112.9 kg (249 lb)        Constitutional: cooperative, not dyspneic/diaphoretic, no acute distress, morbidly obese   Eyes: Sclera anicteric/injected.   Ears/nose/mouth/throat: Normal oropharynx without ulcers or exudate, mucus membranes moist, hearing intact  Neck: supple, thyroid normal size  CV: RRR, no murmur   Respiratory: CTA bilaterally, no wheezing   Lymph: No axillary, submandibular, supraclavicular or inguinal lymphadenopathy  Abd:  Nondistended, +bs, no hepatosplenomegaly, " nontender, no peritoneal signs  Skin: warm, perfused, no jaundice  Neuro: AAO x 3, No asterixis  Psych: Normal affect  MSK: No gross deformities           Data:   Labs and imaging below were independently reviewed and interpreted    BMP    Recent Labs  Lab 08/04/18 0412 07/31/18  0155    138   POTASSIUM 3.4 3.4   CHLORIDE 108 103   KELBY 8.4* 9.1   CO2 26 27   BUN 7 6*   CR 0.55 0.58   GLC 98 94     CBC    Recent Labs  Lab 08/04/18 0412 07/31/18  0155   WBC 7.4 11.6*   RBC 3.81 4.16   HGB 10.9* 12.1   HCT 33.8* 36.6   MCV 89 88   MCH 28.6 29.1   MCHC 32.2 33.1   RDW 13.7 13.5    258     INR    Recent Labs  Lab 08/04/18  0412   INR 1.13     LFTs    Recent Labs  Lab 08/04/18  0412   ALKPHOS 77   AST 13   ALT 26   BILITOTAL 0.2   PROTTOTAL 6.3*   ALBUMIN 3.2*      PANCNo lab results found in last 7 days.      Imaging:  KUB: 8/3/18:   - metal pen spring in gastric antrum     KUB 8/4/2018:   - mental pen spring in gastric body

## 2018-08-04 NOTE — DISCHARGE INSTRUCTIONS
You have an appointment scheduled with your psychiatrist at Dr. Brionna De La Rosa at [facility] located at [address] [facility] [address], [place]  [address]:    08/09/2018: Clinic was unable to verify time; please call [phone] if you are unaware of appointment time.

## 2018-08-04 NOTE — PLAN OF CARE
Problem: Behavior Regulation (Impulse Control) Impairment (Nonsuicidal Self-injury) (Adult)  Goal: Improved Behavior Regulation and Impulse Control (Nonsuicidal Self-injury)  Outcome: No Change  Status: Patient admitted from [place] ED at 0300 due to swallowing of pen spring at 1600 on 8/3, observation status, attendant at bedside to prevent swallowing of foreign objects   VS: VSS   Neuros: Intact ex numbness of top of L foot   GI: NPO  : Voiding spontaneously?  IV: Vascular access ordered for PIV  Activity: Up independently  Pain: Denies pain  Respiratory/Trach: Lung sounds clear throughout  Social: Confidential status  Plan of care: Psych consult tomorrow, repeat abdominal x-ray, GI consult, monitor for signs of peritonitis, follow POC

## 2018-08-04 NOTE — PROGRESS NOTES
OBSERVATION GOALS    1.  diagnostic tests and consults completed and resulted - Unknown  2. vital signs normal or at patient baseline- Yes, VSS.   3. tollerating oral intake to maintain hydration- No. NPO status.  Avis sips of water with medications.  4. adequate pain control on oral analgesics - Denies pain.  5. returns to baseline functional status - unknown  6. safe disposition plan has been identified- unknown   7. GI consult completed with safe dispo - GI consult in place  8. Psych consult completed with safe dispo -Psych consult done  9. Nurse to notify provider when observation goals have been met and patient is ready for discharge- not done, goals are not complete

## 2018-08-04 NOTE — DISCHARGE SUMMARY
"ED Observation Discharge Summary    Nevin Alvarado   MRN# [id number]  Age: 27 year old   YOB: 1990            Date of Admission:  8/3/2018    Date of Discharge:  8/4/2018  Admitting Physician:  Aquilino Ragsdale MD  Discharge Physician: Gustavo Quintero MD  NP/PA: Verónica Frausto CNP      DISCHARGE DIAGNOSIS:     Foreign body ingestion    * No resolved hospital problems. *      PHYSICAL EXAM:   Blood pressure 128/67, pulse 75, temperature 96.7  F (35.9  C), temperature source Oral, resp. rate 14, height 1.575 m (5' 2\"), weight 112.8 kg (248 lb 9.6 oz), SpO2 95 %, not currently breastfeeding.     GENERAL APPEARANCE: Pleasant, generally appears well, A/O x4. NAD.  SKIN: Clean, dry, and intact without visible lesions, rash, jaundice, cyanosis, erythema, ecchymoses to exposed areas. No hair or nail changes.  HEENT/NECK: NCAT w/out masses, lesions, or abnormalities. Sclera anicteric, PERRLA, EOMI.  Oral mucosa pink and moist without erythema, exudate, lesions, ulcerations, or thrush. Teeth and gums normal. Neck supple, no masses. No jugular venous distention.   CARDIOVASCULAR: S1, S2 RRR. No murmurs, rubs, or gallops.   RESPIRATORY: Respiratory effort WNL. CTA  bilaterally without crackles/rales/wheeze   GI: Active BS in all 4 quadrants. Abdomen soft and non-tender. No masses or hepatosplenomegaly.  : Deferred  MUSCULOSKELETAL:  Strength 5/5 in major muscle groups of bilateral UE and LE.  Extremities normal, no gross deformities noted, non-tender to palpation.   PV: 2+ bilateral radial and pedal pulses. No edema noted.   NEURO: CN II-XII grossly intact. Speech normal. Appropriate throughout interview. Sensation grossly WNL.   HEME/LYMPH: No visible bleeding. No palpable submandibular, submental, pre or postauricular, occipital, cervical, or supraclavicular lymph nodes  PSYCHIATRIC: Mentation and affect appear normal  VASCULAR ACCESS: CDI without erythema or discharge. Non-tender.    PROCEDURES AND " IMAGING:   Results for orders placed or performed during the hospital encounter of 08/03/18 (from the past 24 hour(s))   Drug abuse screen 6 urine (tox)   Result Value Ref Range    Amphetamine Qual Urine Negative NEG^Negative    Barbiturates Qual Urine Negative NEG^Negative    Benzodiazepine Qual Urine Negative NEG^Negative    Cannabinoids Qual Urine Negative NEG^Negative    Cocaine Qual Urine Negative NEG^Negative    Ethanol Qual Urine Negative NEG^Negative    Opiates Qualitative Urine Negative NEG^Negative   HCG qualitative urine (UPT)   Result Value Ref Range    HCG Qual Urine Negative NEG^Negative   Abdomen XR, 2 vw, flat and upright    Narrative    ABDOMEN TWO VIEWS  8/3/2018 7:34 PM     COMPARISON: Frontal view of the abdomen 7/30/2018.    HISTORY: Swallowed pen spring.      Impression    IMPRESSION: Swallowed foreign body (pen spring) remains in the gastric  antrum. Abdominal bowel gas pattern is nonspecific. There is no  evidence for free intraperitoneal air.    PHU PATEL MD   GI LUMINAL ADULT IP CONSULT: Patient to be seen: Routine within 24 hrs; Call back #: [id number]; s/p ingestion of foreign body; Consultant may enter orders: Yes    Ada Patrick MD     8/4/2018 12:24 PM      GASTROENTEROLOGY CONSULTATION      Date of Admission:  8/3/2018           Reason for Consultation:   We were asked by Dr. Ragsdale  of ED to evaluate this patient with   recurrent foreign body ingestion          ASSESSMENT AND RECOMMENDATIONS:   Assessment:  27 year old female with a history of  ADD, MDD, PTSD, borderline   personality disorder, multiple episodes of foreign body ingestion   over the last year, presents to ED after swallowed a pen spring.    # Recurrent foreign body ingestion: the 3rd episode this week.   Reports mild abdominal pain but not bothering her. She is hungry   and wants to eat. She denied nausea/vomiting, hematemesis, severe   abdominal pain and rebound tenderness. Vital sign stable. Per pt,    foreign body ingestion somehow help her relive anxiety.   KUB 8/3, 8/4 showed a <6 cm long, < 5 mm wide pen spring, which   is a non sharp object in stomach. The location and features of   the object did not fit criteria for emergent or urgent endoscopic   retrieval. Pt is at high risk of recurrent foreign body ingestion   and repeat EGD is unlikely to be the cure.     Recommendations  - Psychiatry consult. GI recommendation will coordinate with   psychiatry evaluation   - Keep 1:1 sitter to avoid further foreign body ingestion   - Keep NPO   - IV fluid while NPO   - Watch for sign of peritonitis or increasing abdominal pain   - GI will continue to follow.     Thank you for involving us in this patient's care. Please do not   hesitate to contact the GI service with any questions or   concerns.     Pt care plan discussed with Dr. Paula, GI staff physician.    Ada Lin  ------------------------------------------------------------------  -------------------------------------------------           History of Present Illness:   Nevin Alvarado is a 27 year old female with a history of   ADD, MDD, PTSD, borderline personality disorder, multiple   episodes of foreign body ingestion over the last year, presents   to ED after swallowed a pen spring. Per patient, she swallow   foreign object because it helps relive her anxiety. She   understand ingestion could cause damage to her GI tract, so she   has been bending the object and avoid sharps to minimize GI   injury. She has been followed by psychiatry at [facility]SH. She was   started on anxiety treatment (clonidine BID and behavioral tx) a   week ago, noted inconsistent improvement of her symptoms. As for   now, she denies anxiety, nausea, vomiting, hematemesis and severe   abdominal pain. Reports some abdominal discomfort but states it   does not bother her. She is hungry and wants to eat.     Per chart, pt had 3 ED visit for foreign body ingestion over the   last  week, and 12 EGD and 1 flex sig done for foreign body   removal over the last year.           Past Medical History:   Reviewed and edited as appropriate  Past Medical History:   Diagnosis Date     ADD (attention deficit disorder)      Anorexia nervosa with bulimia     history of; on Topamax     Anxiety      Borderline personality disorder      Depression      Depressive disorder      H/O self-harm      Lives in independent group home     due to debilitating mental illness     Migraine without aura     no known triggers; on Topamax bid and Imitrex PRN     Morbid obesity (H)      PTSD (post-traumatic stress disorder)      Rectal foreign body - Recurrent issue, self placed      Swallowed foreign body - Recurrent issue, self placed             Past Surgical History:   Reviewed and edited as appropriate   Past Surgical History:   Procedure Laterality Date     ESOPHAGOSCOPY, GASTROSCOPY, DUODENOSCOPY (EGD), COMBINED N/A   3/9/2017    Procedure: COMBINED ESOPHAGOSCOPY, GASTROSCOPY, DUODENOSCOPY   (EGD), REMOVE FOREIGN BODY;  Surgeon: Avis Guzmán MD;  Location: [facility]     ESOPHAGOSCOPY, GASTROSCOPY, DUODENOSCOPY (EGD), COMBINED N/A   4/20/2017    Procedure: COMBINED ESOPHAGOSCOPY, GASTROSCOPY, DUODENOSCOPY   (EGD), REMOVE FOREIGN BODY;  EGD removal Foregin body;  Surgeon:   Lokesh Paula MD;  Location: [facility]     ESOPHAGOSCOPY, GASTROSCOPY, DUODENOSCOPY (EGD), COMBINED N/A   6/12/2017    Procedure: COMBINED ESOPHAGOSCOPY, GASTROSCOPY, DUODENOSCOPY   (EGD);  COMBINED ESOPHAGOSCOPY, GASTROSCOPY, DUODENOSCOPY (EGD)   [[id number]]attempted removal of foreign body;  Surgeon:   Pamela Perez MD;  Location: [facility]     ESOPHAGOSCOPY, GASTROSCOPY, DUODENOSCOPY (EGD), COMBINED N/A   6/9/2017    Procedure: COMBINED ESOPHAGOSCOPY, GASTROSCOPY, DUODENOSCOPY   (EGD), REMOVE FOREIGN BODY;  Esophagoscopy, Gastroscopy,   Duodenoscopy, Removal of Foreign Body;  Surgeon: Dejon Marsh MD;   Location: [facility]     ESOPHAGOSCOPY, GASTROSCOPY, DUODENOSCOPY (EGD), COMBINED N/A   1/6/2018    Procedure: COMBINED ESOPHAGOSCOPY, GASTROSCOPY, DUODENOSCOPY   (EGD), REMOVE FOREIGN BODY;  COMBINED ESOPHAGOSCOPY, GASTROSCOPY,   DUODENOSCOPY (EGD) [by pascal net and snare with profol sedation;    Surgeon: Feliciano Emmanuel MD;  Location:  GI     ESOPHAGOSCOPY, GASTROSCOPY, DUODENOSCOPY (EGD), COMBINED N/A   3/19/2018    Procedure: COMBINED ESOPHAGOSCOPY, GASTROSCOPY, DUODENOSCOPY   (EGD), REMOVE FOREIGN BODY;   Esophagodscopy, Gastroscopy,   Duodenoscopy,Foreign Body Removal;  Surgeon: Brice Guzmán MD;    Location: [facility]     ESOPHAGOSCOPY, GASTROSCOPY, DUODENOSCOPY (EGD), COMBINED N/A   4/16/2018    Procedure: COMBINED ESOPHAGOSCOPY, GASTROSCOPY, DUODENOSCOPY   (EGD), REMOVE FOREIGN BODY;  Esophagogastroduodenoscopy  Foreign   Body Removal X 2;  Surgeon: Royer Moise MD;    Location: [facility]     ESOPHAGOSCOPY, GASTROSCOPY, DUODENOSCOPY (EGD), COMBINED N/A   6/1/2018    Procedure: COMBINED ESOPHAGOSCOPY, GASTROSCOPY, DUODENOSCOPY   (EGD), REMOVE FOREIGN BODY;  COMBINED ESOPHAGOSCOPY, GASTROSCOPY,   DUODENOSCOPY with removal of foreign body, propofol sedation by   anesthesia;  Surgeon: Brice Martinez MD;  Location:  GI     ESOPHAGOSCOPY, GASTROSCOPY, DUODENOSCOPY (EGD), COMBINED N/A   7/25/2018    Procedure: COMBINED ESOPHAGOSCOPY, GASTROSCOPY, DUODENOSCOPY   (EGD), REMOVE FOREIGN BODY;;  Surgeon: Candy Castelan MD;  Location:  GI     ESOPHAGOSCOPY, GASTROSCOPY, DUODENOSCOPY (EGD), COMBINED N/A   7/28/2018    Procedure: COMBINED ESOPHAGOSCOPY, GASTROSCOPY, DUODENOSCOPY   (EGD), REMOVE FOREIGN BODY;  COMBINED ESOPHAGOSCOPY, GASTROSCOPY,   DUODENOSCOPY (EGD), REMOVE FOREIGN BODY;  Surgeon: Brice Guzmán MD;  Location: [facility]     ESOPHAGOSCOPY, GASTROSCOPY, DUODENOSCOPY (EGD), COMBINED N/A   7/31/2018    Procedure: COMBINED ESOPHAGOSCOPY, GASTROSCOPY, DUODENOSCOPY   (EGD);   COMBINED ESOPHAGOSCOPY, GASTROSCOPY, DUODENOSCOPY (EGD)   TO REMOVE FOREIGN BODY;  Surgeon: Lokesh Paula MD;    Location: [facility]     EXAM UNDER ANESTHESIA ANUS N/A 1/10/2017    Procedure: EXAM UNDER ANESTHESIA ANUS;  Surgeon: Annmarie Haynes MD;  Location: [facility]     EXAM UNDER ANESTHESIA RECTUM N/A 7/19/2018    Procedure: EXAM UNDER ANESTHESIA RECTUM;  EXAM UNDER ANESTHESIA,   REMOVAL OF RECTAL FOREIGN BODY;  Surgeon: Annmarie Haynes MD;  Location: [facility]     HC REMOVE FECAL IMPACTION OR FB W ANESTHESIA N/A 12/18/2016    Procedure: REMOVE FECAL IMPACTION/FOREIGN BODY UNDER ANESTHESIA;    Surgeon: Soham Cano MD;  Location: [facility]     KNEE SURGERY - removed a small tissue mass from knee Right      LAPAROSCOPIC ABLATION ENDOMETRIOSIS       LAPAROTOMY EXPLORATORY N/A 1/10/2017    Procedure: LAPAROTOMY EXPLORATORY;  Surgeon: Annmarie Haynes MD;  Location: [facility]     lymph nodes removed from neck; benign  age 7     MAMMOPLASTY REDUCTION Bilateral      RELEASE CARPAL TUNNEL Bilateral      SIGMOIDOSCOPY FLEXIBLE N/A 1/10/2017    Procedure: SIGMOIDOSCOPY FLEXIBLE;  Surgeon: Annmarie Haynes MD;  Location: [facility]     SIGMOIDOSCOPY FLEXIBLE N/A 5/8/2018    Procedure: SIGMOIDOSCOPY FLEXIBLE;  flex sig with foreign body   removal using snare and rattooth forcep;  Surgeon: Soham Cano MD;  Location:  GI            Previous Endoscopy:   No results found for this or any previous visit.         Social History:   Reviewed and edited as appropriate  Social History     Social History     Marital status: Single     Spouse name: N/A     Number of children: N/A     Years of education: N/A     Occupational History     On disability      Social History Main Topics     Smoking status: Never Smoker     Smokeless tobacco: Never Used     Alcohol use No     Drug use: No     Sexual activity: Yes     Partners: Male     Birth control/ protection: IUD      Comment: IUD - Mirena -  placed July, 2015     Other Topics Concern     Not on file     Social History Narrative    Single.    Living in independent living portion of [facility].    On disability.    No regular exercise.             Family History:   Reviewed and edited as appropriate  Family History   Problem Relation Age of Onset     Type 2 Diabetes Maternal Grandmother      Type 2 Diabetes Paternal Grandmother      Breast Cancer Paternal Grandmother      Cerebrovascular Disease Father 53     Myocardial Infarction No family hx of      Coronary Artery Disease Early Onset No family hx of      Ovarian Cancer No family hx of      Colon Cancer No family hx of      No known history of colorectal cancer, liver disease, or   inflammatory bowel disease.       Allergies:   Reviewed and edited as appropriate     Allergies   Allergen Reactions     Penicillins Anaphylaxis     Blood-Group Specific Substance Other (See Comments)     Patient has an anti-Cw and non-specific antibodies. Blood   product orders may be delayed. Draw one red top and two purple   top tubes for all type/screen/crossmatch orders.     Hydrocodone Nausea and Vomiting     vomiting for days     Influenza Vaccines Other (See Comments)     Oseltamivir Hives     med stopped, PN: med stopped     Vicodin [Hydrocodone-Acetaminophen] Nausea and Vomiting     Cephalosporins Rash     Lamotrigine Rash     Possibly associated with Lamictal.      Latex Rash            Medications:     Current Facility-Administered Medications   Medication     acetaminophen (TYLENOL) tablet 1,000 mg     cloNIDine (CATAPRES) half-tab 0.05 mg     desvenlafaxine succinate (PRISTIQ) 24 hr tablet 100 mg     diphenhydrAMINE (BENADRYL) capsule 25-50 mg     lurasidone (LATUDA) tablet 40 mg     magnesium sulfate 2 g in water intermittent infusion     magnesium sulfate 4 g in 100 mL sterile water (premade)     melatonin tablet 1 mg     melatonin tablet 3 mg     naloxone (NARCAN) injection 0.1-0.4 mg     ondansetron  "(ZOFRAN-ODT) ODT tab 4 mg    Or     ondansetron (ZOFRAN) injection 4 mg     pantoprazole (PROTONIX) 40 mg IV push injection     potassium chloride (KLOR-CON) Packet 20-40 mEq     potassium chloride 10 mEq in 100 mL sterile water intermittent   infusion (premix)     potassium chloride 20 mEq in 50 mL intermittent infusion     potassium chloride SA (K-DUR/KLOR-CON M) CR tablet 20-40 mEq     senna-docusate (SENOKOT-S;PERICOLACE) 8.6-50 MG per tablet 1   tablet     sodium chloride 0.9% infusion             Review of Systems:   A complete review of systems was performed and is negative except   as noted in the HPI           Physical Exam:   /62 (BP Location: Left arm)  Pulse 71  Temp 97.2  F (36.2    C) (Oral)  Resp 16  Ht 1.575 m (5' 2\")  Wt 112.8 kg (248 lb   9.6 oz)  SpO2 97%  BMI 45.47 kg/m2  Wt:   Wt Readings from Last 2 Encounters:   08/04/18 112.8 kg (248 lb 9.6 oz)   07/30/18 112.9 kg (249 lb)        Constitutional: cooperative, not dyspneic/diaphoretic, no acute   distress, morbidly obese   Eyes: Sclera anicteric/injected.   Ears/nose/mouth/throat: Normal oropharynx without ulcers or   exudate, mucus membranes moist, hearing intact  Neck: supple, thyroid normal size  CV: RRR, no murmur   Respiratory: CTA bilaterally, no wheezing   Lymph: No axillary, submandibular, supraclavicular or inguinal   lymphadenopathy  Abd:  Nondistended, +bs, no hepatosplenomegaly, nontender, no   peritoneal signs  Skin: warm, perfused, no jaundice  Neuro: AAO x 3, No asterixis  Psych: Normal affect  MSK: No gross deformities           Data:   Labs and imaging below were independently reviewed and   interpreted    BMP    Recent Labs  Lab 08/04/18 0412 07/31/18  0155    138   POTASSIUM 3.4 3.4   CHLORIDE 108 103   KELBY 8.4* 9.1   CO2 26 27   BUN 7 6*   CR 0.55 0.58   GLC 98 94     CBC    Recent Labs  Lab 08/04/18 0412 07/31/18  0155   WBC 7.4 11.6*   RBC 3.81 4.16   HGB 10.9* 12.1   HCT 33.8* 36.6   MCV 89 88   MCH " 28.6 29.1   MCHC 32.2 33.1   RDW 13.7 13.5    258     INR    Recent Labs  Lab 08/04/18  0412   INR 1.13     LFTs    Recent Labs  Lab 08/04/18 0412   ALKPHOS 77   AST 13   ALT 26   BILITOTAL 0.2   PROTTOTAL 6.3*   ALBUMIN 3.2*      PANCNo lab results found in last 7 days.      Imaging:  KUB: 8/3/18:   - metal pen spring in gastric antrum     KUB 8/4/2018:   - mental pen spring in gastric body      CBC with platelets differential   Result Value Ref Range    WBC 7.4 4.0 - 11.0 10e9/L    RBC Count 3.81 3.8 - 5.2 10e12/L    Hemoglobin 10.9 (L) 11.7 - 15.7 g/dL    Hematocrit 33.8 (L) 35.0 - 47.0 %    MCV 89 78 - 100 fl    MCH 28.6 26.5 - 33.0 pg    MCHC 32.2 31.5 - 36.5 g/dL    RDW 13.7 10.0 - 15.0 %    Platelet Count 243 150 - 450 10e9/L    Diff Method Automated Method     % Neutrophils 62.5 %    % Lymphocytes 29.6 %    % Monocytes 5.0 %    % Eosinophils 2.4 %    % Basophils 0.4 %    % Immature Granulocytes 0.1 %    Nucleated RBCs 0 0 /100    Absolute Neutrophil 4.6 1.6 - 8.3 10e9/L    Absolute Lymphocytes 2.2 0.8 - 5.3 10e9/L    Absolute Monocytes 0.4 0.0 - 1.3 10e9/L    Absolute Eosinophils 0.2 0.0 - 0.7 10e9/L    Absolute Basophils 0.0 0.0 - 0.2 10e9/L    Abs Immature Granulocytes 0.0 0 - 0.4 10e9/L    Absolute Nucleated RBC 0.0    Comprehensive metabolic panel   Result Value Ref Range    Sodium 140 133 - 144 mmol/L    Potassium 3.4 3.4 - 5.3 mmol/L    Chloride 108 94 - 109 mmol/L    Carbon Dioxide 26 20 - 32 mmol/L    Anion Gap 7 3 - 14 mmol/L    Glucose 98 70 - 99 mg/dL    Urea Nitrogen 7 7 - 30 mg/dL    Creatinine 0.55 0.52 - 1.04 mg/dL    GFR Estimate >90 >60 mL/min/1.7m2    GFR Estimate If Black >90 >60 mL/min/1.7m2    Calcium 8.4 (L) 8.5 - 10.1 mg/dL    Bilirubin Total 0.2 0.2 - 1.3 mg/dL    Albumin 3.2 (L) 3.4 - 5.0 g/dL    Protein Total 6.3 (L) 6.8 - 8.8 g/dL    Alkaline Phosphatase 77 40 - 150 U/L    ALT 26 0 - 50 U/L    AST 13 0 - 45 U/L   INR   Result Value Ref Range    INR 1.13 0.86 - 1.14    Magnesium   Result Value Ref Range    Magnesium 2.1 1.6 - 2.3 mg/dL   XR Abdomen Port 1 View    Narrative    Examination:  XR ABDOMEN PORT 1 VW    Date:  8/4/2018 9:34 AM     Clinical Information: reevaluate for position of foreign body;      Additional Information: none    Comparison: 8/3/2018 abdominal film.    Findings:     The metallic spring continues to be projected over the body the  stomach and is not passing into the lower GI tract.    The abdominal bowel gas pattern is normal. An IUD is in place.      Impression    Impression:    1. Metallic spring density continues to be projected over the body of  the stomach..    TAMIKO VAZQUEZ MD   Psychiatry IP Consult: MDD, PTSD, borderline personality, multiple OD's and multiple foreign body ingestions with worsening anxiety; Consultant may enter orders: Yes; Patient to be seen: ASAP; Call back #: [phone]    Narrative    Martinez, Alfredo SAM MD     8/4/2018 10:50 AM  Initial  Dictated   UPPER GI ENDOSCOPY   Result Value Ref Range    Upper GI Endoscopy       [address] [place] [address] [phone]     Endoscopy Department  _______________________________________________________________________________  Patient Name: Nevin Alvarado     Procedure Date: 8/4/2018 2:29 PM  MRN: [id number]                       Account Number: [id number]  YOB: 1990             Admit Type: Inpatient  Age: 27                                Gender: Female  Note Status: Finalized                Attending MD: Lokesh Paula ,   Pause for the Cause: Pause for cause completed Total Sedation Time:   _______________________________________________________________________________     Procedure:           Upper GI endoscopy  Indications:         Foreign body in the stomach  Providers:           Lokesh Paula  Referring MD:          Medicines:           General Anesthesia  Complications:       No immediate  complications.  _______________________________________________________________________________   Procedure:           Pre-Anesthesia Assessment:                       - Prior to the procedure, a History and Physical was                        performed, and patient medications and allergies were                        reviewed. The patient is competent. The risks and                        benefits of the procedure and the sedation options and                        risks were discussed with the patient. All questions                        were answered and informed consent was obtained. Patient                        identification and proposed procedure were verified by                        the physician and the nurse in the pre-procedure area in                        the procedure room. Mental Status Examination: alert and                        oriented. Airway Examination: normal oropharyngeal                        airway and neck mobility and Mallampati Class III (part                        of the uvula and soft palate visualized). Respiratory                        Ex amination: clear to auscultation. CV Examination:                        normal. Prophylactic Antibiotics: The patient does not                        require prophylactic antibiotics. Prior Anticoagulants:                        The patient has taken no previous anticoagulant or                        antiplatelet agents. ASA Grade Assessment: III - A                        patient with severe systemic disease. After reviewing                        the risks and benefits, the patient was deemed in                        satisfactory condition to undergo the procedure. The                        anesthesia plan was to use general anesthesia.                        Immediately prior to administration of medications, the                        patient was re-assessed for adequacy to receive                        sedatives. The heart rate,  respiratory rate, oxygen                        saturations, blood pressure, adequacy of pulmonary                        ventilation, and response t o care were monitored                        throughout the procedure. The physical status of the                        patient was re-assessed after the procedure.                       After obtaining informed consent, the endoscope was                        passed under direct vision. Throughout the procedure,                        the patient's blood pressure, pulse, and oxygen                        saturations were monitored continuously. The Endoscope                        was introduced through the mouth, and advanced to the                        second part of duodenum. The upper GI endoscopy was                        accomplished without difficulty. The patient tolerated                        the procedure well.                                                                                   Findings:       The examined esophagus was normal.       A pen spring was found in the gastric body. Removal was accomplished        with a rat-toothed forceps.       The examined  duodenum was normal.                                                                                   Impression:          - Normal esophagus.                       - A pen spring was found in the stomach. Removal was                        successful.                       - Normal examined duodenum.  Recommendation:      - Return patient to ED observation for ongoing care. OK                        to discharge from GI perspective.                       - Resume previous diet.                       - Continue present medications.                       - See GI consult note for further recommendations                        regarding repeat ingestion of foreign objects.                                                                                     Electronically signed by: Lokesh  MD Chai  _____________________  Lokesh Carlos Paula,   8/4/2018 2:52:56 PM  I was physically present for the entire viewing portion of the exam.  __________________________  Signature of teaching lou venegasion  Amairani/[id number] Paula  Number of Addenda: 0    Note Initiated On: 8/4/2018 2:29 PM  Scope In:  Scope Out:       DISCHARGE MEDICATIONS:   Current Discharge Medication List      CONTINUE these medications which have NOT CHANGED    Details   Cholecalciferol (VITAMIN D3 PO) Take 2,000 Units by mouth every morning       CLONIDINE HCL PO Take 0.05 mg by mouth 2 times daily       Desvenlafaxine Succinate (PRISTIQ PO) Take 100 mg by mouth every morning       lurasidone (LATUDA) 20 MG TABS tablet Take 2 tablets (40 mg) by mouth every evening  Qty: 12 tablet, Refills: 0    Associated Diagnoses: Borderline personality disorder; Severe episode of recurrent major depressive disorder, without psychotic features (H)      acetaminophen (TYLENOL) 325 MG tablet Take 2 tablets (650 mg) by mouth every 8 hours  Qty: 10 tablet, Refills: 0    Associated Diagnoses: Rectal foreign body, initial encounter      DiphenhydrAMINE HCl (BENADRYL PO) Take 25-50 mg by mouth nightly as needed      levonorgestrel (MIRENA) 20 MCG/24HR IUD 1 each (20 mcg) by Intrauterine route once for 1 dose      MELATONIN PO Take 3 mg by mouth nightly as needed (sleep)      omeprazole (PRILOSEC) 40 MG capsule Take 1 capsule (40 mg) by mouth daily Take 30-60 minutes before a meal.  Qty: 30 capsule, Refills: 0    Associated Diagnoses: Swallowed foreign body, initial encounter               CONSULTATIONS:   Consultation during this admission received from:  Gastroenterology   Psychiatry    BRIEF HISTORY OF PRESENT ILLNESS:   (Adopted from admission H&P).  Nevin Alvarado is a 27 year old female, history of MDD, ADD, PTSD, borderline personality disorder, history of multiple ED visits following ingestion/rectal insertion - last discharged 07/31/2018 - presents  after swallowing pen spring at 4 p.m today. Trigger for act was increasing anxiety due to recent move. Currently managing anxiety with clonidine twice daily and outpatient behavioural tx with new therapist. Denies suicide ideation. Denies abdominal pain, fever, nausea, vomiting, diarrhea. No other physical complaints currently. Anxiety currently improved; mood stable.  Patient currently working with 2 case workers, therapist, primary care, and reports she is moving into new group home with full staffing support.     ED OBSERVATION COURSE:  Nevin was admitted to Emergency Dept Observation Unit for monitoring of her abdominal pain and vital signs overnight. In the morning, an abdominal xray showed that the spring from the pen remained in the stomach. Patient continued to complain of mild LUQ pain but was sleeping soundly. Psychiatry came to see patient, who agrees to keep her follow up appt with Dr Brionna De La Rosa and also discuss possible altering some of her medications (latuda and clonidine). She also discussed options for staying busy when she feels anxious, rather than inserting foreign objects to her body, as this is the 4th admission for retrieval in 7-10 days. Gastroenterology came to see patient and did agree to remove spring from stomach, although it was not truly urgent or emergent. Spoke with Social Work regarding Nevin as well, process to put a new Clinical Care Plan in place has been started. All services discussed with Nevin that this behavior is not sustainable or safe for her to continue. She needs to utilize the community of resources she is already connected with to better manage her anxiety rather than repeat visits to the Emergency Dept. She is agrees to follow up with her psychiatrist, therapist, and care coordinator next week.       DISCHARGE DISPOSITION:   Discharged to home. The patient was discharged in a stable condition.     DISCHARGE INSTRUCTIONS AND FOLLOW-UP:    Discharge Procedure  "Orders  Reason for your hospital stay   Order Comments: You were admitted to Emergency Dept Observation for monitoring overnight of your abdominal symptoms after ingestion of the spring of a pen. You did well overnight with a sitter at the bedside and were evaluated by psychiatry and gastroenterology this morning. Recommend future treatment for foreign objects be done within the [facility] system for better coordination of care. You tolerated removal of the spring well. You would like to eat before going home and have asked for a cab ride to home.     Adult [facility] [[email or web address] Follow-up and recommended labs and tests   Order Comments: Keep your psychiatry appointment on Aug 9 with Dr Brionna De La Rosa.    Call Dr De La Rosa on Monday and let her know about about this admission, the 4th foreign body retrieval in 7-10 days, in case she wants to adjust any medications.    Follow up with primary care provider, Latonya Knight, within 7 days for hospital follow- up.        Appointments on [place] and/or [facility] (with [facility] [[email or web address]] or [facility] provider or service). Call [phone] if you haven't heard regarding these appointments within 7 days of discharge.     Activity   Order Comments: Your activity upon discharge: activity as tolerated   Order Specific Question Answer Comments   Is discharge order? Yes      When to contact your care team   Order Comments: Call [phone] if any of the following occur:   \" Chest pain or shortness of breath  \" Vomiting blood (red or black)  \" Blood in your stool (dark red or black color)     Discharge Instructions   Order Comments: -Keep your psychiatry appt on Thursday, Aug 9 with Dr Brionna De La Rosa  -Call Dr De La Rosa on Monday and let her know about about this admission, the 4th foreign body retrieval in 7-10 days, in case she wants to adjust any medications (Possibly the latuda or clonidine, per Dr Martinez).  -Call your , Becca Neves, and see if you can get more frequent home visits and " support.  -Keep your appointment with your therapist at [facility] on Wednesday, Aug 8.  -To better manage your anxiety, stay busy at home with crafts, television, or even going shopping.  -If you have urges to swallow things or insert them into your rectum, call [facility] Crisis Line     Full Code     Diet   Order Comments: Follow this diet upon discharge:      Regular Diet Adult   Order Specific Question Answer Comments   Is discharge order? Yes         Attestation:   I have reviewed today's vital signs, notes, medications, labs and imaging.    HU Willett, CNP  Emergency Department Observation Unit

## 2018-08-04 NOTE — PLAN OF CARE
Problem: Behavior Regulation (Impulse Control) Impairment (Nonsuicidal Self-injury) (Adult)  Goal: Improved Behavior Regulation and Impulse Control (Nonsuicidal Self-injury)  Outcome: No Change  Status: Patient admitted from [place] ED at 0300 due to swallowing of pen spring at 1600 on 8/3, observation status, attendant at bedside to prevent swallowing of foreign objects   VS: VSS   Neuros: Intact ex numbness of top of L foot   GI: NPO  : Voiding spontaneously?  IV: Vascular access ordered for PIV.  NS @ 125mL/hr.    Activity: Up independently  Pain: c/o abdominal pain at 1330.  Tylenol given.    Respiratory/Trach: Lung sounds clear throughout  Social: Confidential status  Plan of care: Psych consult today, repeat abdominal x-ray, GI consult, monitor for signs of peritonitis, follow POC  OR Plan:  Removal of foreign object. Hi scrub done at bedside.  Went to PACU at 1340 for OR.  Attendant is with patient.  Report given to CARMEN Rivera in PACU.

## 2018-08-04 NOTE — H&P
[facility] ED Observation Admission Note    Chief Complaint   Patient presents with     Swallowed Foreign Body     has been having a lot anxiety due to moving to another apartment. Per pt she usually swallows foreign objects when she is anxious. At around 4 PM pt swallowed a pen spring. Denies it was a suicidal attempt.        Assessment/Plan:  1. Ingested Foreign Body: h/o multiple foreign body ingestions and insertions p/t after swallowing a spring out of a pen ~4pm yesterday; came to ED with a cab at about 5:30pm. Denies any fever, chills, abdominal pain, nausea, vomiting, throat pain, chest pain, hematochezia, melena, lightheadedness, SI, HI. Reports worsening anxiety 2/2 being transitioned from her own apartment to a more supervised group home setting in September. Recently admitted to ED Obs Service on 7/31/18 with a similar incident with swallowing the spring from a pen; Psychiatry was to be consulted however patient was discussed with Dr. Solano who had previously consulted on her on 7/19/18 and had no recommendations for changes for the patient. Full consult note was done on 7/19/18. In ED, HR 80's-100's, '-150's/70's-90's, RR 18, SaO2 % on RA, Temp 99. No labs were done. UPT negative. UDS negative. Abdominal Xray shows swallowed foreign body (pen spring) in the gastric antrum. Abdominal bowel gas pattern is non specific; no evidence of free intra peritoneal air. GI was consulted and suggested formal psychiatry evaluation in AM, NPO on MIVF, CBC and CMP, watch for signs of peritonitis, KUB in AM.   - GI consulted. Plan to OR for EGD and extraction in AM  - NPO  - Serial abdominal exams  - KUB in AM  - Protonix 40mg IV BID  - NS at 125ml/hr  - Avoid narcotics  - 1:1 sitter  - Zofran prn  - Psychiatry Consult  - Send basic labs     2. Borderline Personality Disorder: - Continue with PTA Latuda, Pristiq, Clonidine   - Psychiatry consult for worsening anxiety      HPI:  Nevin ZAN Jhonnyjuana is a 27 year  old female with a history of ADD, MDD, PTSD, borderline personality disorder, multiple OD's, SIB with cutting and multiple foreign body ingestions and insertions who presented to the ED after swallowing a spring out of a pen. At about 4pm yesterday she decided to swallow the spring inside a pen. She came to the ED with a cab at about 5:30pm. Denies any abdominal pain, nausea, vomiting, throat pain, chest pain, hematochezia, melena, lightheadedness, SI, HI. Denies fever, chills, dysuria, hematuria, urgency, URI symptoms. She states she did it this time because she was getting very anxious. For her last few admissions she has continued to mention being transitioned from her own apartment to a more supervised group home setting in September which she has been anxious about. She lives alone currently in her own apartment and states she does not have any family support system. Her support system is a  who comes over to her apartment every couple of days; , ILS worker, psychiatrist, therapist. Psychiatrist is through [facility].     She was recently admitted to the ED Obs Service on 7/31/18 with a similar incident with swallowing the spring from a pen; Psychiatry was to be consulted however patient was discussed with Dr. Solano who had previously consulted on her on 7/19/18 and had no recommendations for changes for the patient. Full consult note was done on 7/19/18. She was also admitted on 7/28/18 after swallowing a spring out of a pen and was extracted by GI. She was admitted under CRS on 7/19/18 with foreign body x 2 in her rectum after she was trying to pleasure herself with inserting two glass bottles of essential oils into her rectum; attempt to remove them failed.     In the ED, HR 80's-100's, '-150's/70's-90's, RR 18, SaO2 % on RA, Temp 99. No labs were done. UPT negative. UDS negative. Abdominal Xray shows swallowed foreign body (pen spring) in the gastric antrum. Abdominal bowel  gas pattern is non specific; no evidence of free intra peritoneal air. GI was consulted and suggested formal psychiatry evaluation in AM, NPO on MIVF, CBC and CMP, watch for signs of peritonitis, KUB in AM.     On admission to the observation unit the patient was stable.    History:    Past Medical History:   Diagnosis Date     ADD (attention deficit disorder)      Anorexia nervosa with bulimia     history of; on Topamax     Anxiety      Borderline personality disorder      Depression      Depressive disorder      H/O self-harm      Lives in independent group home     due to debilitating mental illness     Migraine without aura     no known triggers; on Topamax bid and Imitrex PRN     Morbid obesity (H)      PTSD (post-traumatic stress disorder)      Rectal foreign body - Recurrent issue, self placed      Swallowed foreign body - Recurrent issue, self placed        Past Surgical History:   Procedure Laterality Date     ESOPHAGOSCOPY, GASTROSCOPY, DUODENOSCOPY (EGD), COMBINED N/A 3/9/2017    Procedure: COMBINED ESOPHAGOSCOPY, GASTROSCOPY, DUODENOSCOPY (EGD), REMOVE FOREIGN BODY;  Surgeon: Avis Guzmán MD;  Location: [facility] OR     ESOPHAGOSCOPY, GASTROSCOPY, DUODENOSCOPY (EGD), COMBINED N/A 4/20/2017    Procedure: COMBINED ESOPHAGOSCOPY, GASTROSCOPY, DUODENOSCOPY (EGD), REMOVE FOREIGN BODY;  EGD removal Foregin body;  Surgeon: Lokesh Paula MD;  Location: [facility] OR     ESOPHAGOSCOPY, GASTROSCOPY, DUODENOSCOPY (EGD), COMBINED N/A 6/12/2017    Procedure: COMBINED ESOPHAGOSCOPY, GASTROSCOPY, DUODENOSCOPY (EGD);  COMBINED ESOPHAGOSCOPY, GASTROSCOPY, DUODENOSCOPY (EGD) [[id number]]attempted removal of foreign body;  Surgeon: Pamela Perez MD;  Location: [facility] OR     ESOPHAGOSCOPY, GASTROSCOPY, DUODENOSCOPY (EGD), COMBINED N/A 6/9/2017    Procedure: COMBINED ESOPHAGOSCOPY, GASTROSCOPY, DUODENOSCOPY (EGD), REMOVE FOREIGN BODY;  Esophagoscopy, Gastroscopy, Duodenoscopy, Removal of Foreign Body;   Surgeon: Dejon Marsh MD;  Location: [facility] OR     ESOPHAGOSCOPY, GASTROSCOPY, DUODENOSCOPY (EGD), COMBINED N/A 1/6/2018    Procedure: COMBINED ESOPHAGOSCOPY, GASTROSCOPY, DUODENOSCOPY (EGD), REMOVE FOREIGN BODY;  COMBINED ESOPHAGOSCOPY, GASTROSCOPY, DUODENOSCOPY (EGD) [by pascal net and snare with profol sedation;  Surgeon: Feliciano Emmanuel MD;  Location:  GI     ESOPHAGOSCOPY, GASTROSCOPY, DUODENOSCOPY (EGD), COMBINED N/A 3/19/2018    Procedure: COMBINED ESOPHAGOSCOPY, GASTROSCOPY, DUODENOSCOPY (EGD), REMOVE FOREIGN BODY;   Esophagodscopy, Gastroscopy, Duodenoscopy,Foreign Body Removal;  Surgeon: Brice Guzmán MD;  Location: [facility] OR     ESOPHAGOSCOPY, GASTROSCOPY, DUODENOSCOPY (EGD), COMBINED N/A 4/16/2018    Procedure: COMBINED ESOPHAGOSCOPY, GASTROSCOPY, DUODENOSCOPY (EGD), REMOVE FOREIGN BODY;  Esophagogastroduodenoscopy  Foreign Body Removal X 2;  Surgeon: Royer Moise MD;  Location: [facility]     ESOPHAGOSCOPY, GASTROSCOPY, DUODENOSCOPY (EGD), COMBINED N/A 6/1/2018    Procedure: COMBINED ESOPHAGOSCOPY, GASTROSCOPY, DUODENOSCOPY (EGD), REMOVE FOREIGN BODY;  COMBINED ESOPHAGOSCOPY, GASTROSCOPY, DUODENOSCOPY with removal of foreign body, propofol sedation by anesthesia;  Surgeon: Brice Martinez MD;  Location:  GI     ESOPHAGOSCOPY, GASTROSCOPY, DUODENOSCOPY (EGD), COMBINED N/A 7/25/2018    Procedure: COMBINED ESOPHAGOSCOPY, GASTROSCOPY, DUODENOSCOPY (EGD), REMOVE FOREIGN BODY;;  Surgeon: Candy Castelan MD;  Location:  GI     ESOPHAGOSCOPY, GASTROSCOPY, DUODENOSCOPY (EGD), COMBINED N/A 7/28/2018    Procedure: COMBINED ESOPHAGOSCOPY, GASTROSCOPY, DUODENOSCOPY (EGD), REMOVE FOREIGN BODY;  COMBINED ESOPHAGOSCOPY, GASTROSCOPY, DUODENOSCOPY (EGD), REMOVE FOREIGN BODY;  Surgeon: Brice Guzmán MD;  Location: [facility]     ESOPHAGOSCOPY, GASTROSCOPY, DUODENOSCOPY (EGD), COMBINED N/A 7/31/2018    Procedure: COMBINED ESOPHAGOSCOPY, GASTROSCOPY, DUODENOSCOPY (EGD);  COMBINED  ESOPHAGOSCOPY, GASTROSCOPY, DUODENOSCOPY (EGD) TO REMOVE FOREIGN BODY;  Surgeon: Lokesh Paula MD;  Location: [facility]     EXAM UNDER ANESTHESIA ANUS N/A 1/10/2017    Procedure: EXAM UNDER ANESTHESIA ANUS;  Surgeon: Annmarie Haynes MD;  Location: [facility]     EXAM UNDER ANESTHESIA RECTUM N/A 7/19/2018    Procedure: EXAM UNDER ANESTHESIA RECTUM;  EXAM UNDER ANESTHESIA, REMOVAL OF RECTAL FOREIGN BODY;  Surgeon: Annmarie Haynes MD;  Location: [facility] OR     HC REMOVE FECAL IMPACTION OR FB W ANESTHESIA N/A 12/18/2016    Procedure: REMOVE FECAL IMPACTION/FOREIGN BODY UNDER ANESTHESIA;  Surgeon: Soham Cano MD;  Location: [facility] OR     KNEE SURGERY - removed a small tissue mass from knee Right      LAPAROSCOPIC ABLATION ENDOMETRIOSIS       LAPAROTOMY EXPLORATORY N/A 1/10/2017    Procedure: LAPAROTOMY EXPLORATORY;  Surgeon: Annmarie Haynes MD;  Location: [facility] OR     lymph nodes removed from neck; benign  age 7     MAMMOPLASTY REDUCTION Bilateral      RELEASE CARPAL TUNNEL Bilateral      SIGMOIDOSCOPY FLEXIBLE N/A 1/10/2017    Procedure: SIGMOIDOSCOPY FLEXIBLE;  Surgeon: Annmarie Haynes MD;  Location: [facility] OR     SIGMOIDOSCOPY FLEXIBLE N/A 5/8/2018    Procedure: SIGMOIDOSCOPY FLEXIBLE;  flex sig with foreign body removal using snare and rattooth forcep;  Surgeon: Soham Cano MD;  Location: [facility] GI       Family History   Problem Relation Age of Onset     Type 2 Diabetes Maternal Grandmother      Type 2 Diabetes Paternal Grandmother      Breast Cancer Paternal Grandmother      Cerebrovascular Disease Father 53     Myocardial Infarction No family hx of      Coronary Artery Disease Early Onset No family hx of      Ovarian Cancer No family hx of      Colon Cancer No family hx of        Social History     Social History     Marital status: Single     Spouse name: N/A     Number of children: N/A     Years of education: N/A     Occupational History     On disability      Social History Main  Topics     Smoking status: Never Smoker     Smokeless tobacco: Never Used     Alcohol use No     Drug use: No     Sexual activity: Yes     Partners: Male     Birth control/ protection: IUD      Comment: IUD - Mirena - placed July, 2015     Other Topics Concern     Not on file     Social History Narrative    Single.    Living in independent living portion of [facility].    On disability.    No regular exercise.          No current facility-administered medications on file prior to encounter.   Current Outpatient Prescriptions on File Prior to Encounter:  Cholecalciferol (VITAMIN D3 PO) Take 2,000 Units by mouth every morning    CLONIDINE HCL PO Take 0.05 mg by mouth 2 times daily    Desvenlafaxine Succinate (PRISTIQ PO) Take 100 mg by mouth every morning    lurasidone (LATUDA) 20 MG TABS tablet Take 2 tablets (40 mg) by mouth every evening   acetaminophen (TYLENOL) 325 MG tablet Take 2 tablets (650 mg) by mouth every 8 hours   DiphenhydrAMINE HCl (BENADRYL PO) Take 25-50 mg by mouth nightly as needed   levonorgestrel (MIRENA) 20 MCG/24HR IUD 1 each (20 mcg) by Intrauterine route once for 1 dose   MELATONIN PO Take 3 mg by mouth nightly as needed (sleep)   omeprazole (PRILOSEC) 40 MG capsule Take 1 capsule (40 mg) by mouth daily Take 30-60 minutes before a meal.       Data:    Results for orders placed or performed during the hospital encounter of 08/03/18   Abdomen XR, 2 vw, flat and upright    Narrative    ABDOMEN TWO VIEWS  8/3/2018 7:34 PM     COMPARISON: Frontal view of the abdomen 7/30/2018.    HISTORY: Swallowed pen spring.      Impression    IMPRESSION: Swallowed foreign body (pen spring) remains in the gastric  antrum. Abdominal bowel gas pattern is nonspecific. There is no  evidence for free intraperitoneal air.    PHU PATEL MD   Drug abuse screen 6 urine (tox)   Result Value Ref Range    Amphetamine Qual Urine Negative NEG^Negative    Barbiturates Qual Urine Negative NEG^Negative     Benzodiazepine Qual Urine Negative NEG^Negative    Cannabinoids Qual Urine Negative NEG^Negative    Cocaine Qual Urine Negative NEG^Negative    Ethanol Qual Urine Negative NEG^Negative    Opiates Qualitative Urine Negative NEG^Negative   HCG qualitative urine (UPT)   Result Value Ref Range    HCG Qual Urine Negative NEG^Negative        ROS:    Review Of Systems  Skin: negative  Eyes: negative  Ears/Nose/Throat: negative  Respiratory: No shortness of breath, dyspnea on exertion, cough, or hemoptysis  Cardiovascular: negative  Gastrointestinal: negative  Genitourinary: negative  Musculoskeletal: negative  Neurologic: negative  Psychiatric: negative  Hematologic/Lymphatic/Immunologic: negative  Endocrine: negative  PCP: Dr. Knight    10 point ROS negative other than the symptoms noted above.      Exam:  Vitals:  B/P: 130/78, T: 97, P: 88, R: 18    Constitutional: alert, no distress and over weight  Head: Normocephalic. No masses, lesions, tenderness or abnormalities  Neck: Neck supple. No adenopathy. Thyroid symmetric, normal size,, Carotids without bruits.  ENT: ENT exam normal, no neck nodes or sinus tenderness  Cardiovascular: negative, PMI normal. No lifts, heaves, or thrills. RRR. No murmurs, clicks gallops or rub  Respiratory: negative, Percussion normal. Good diaphragmatic excursion. Lungs clear  Gastrointestinal: Abdomen soft, obese, non-tender. BS normal. No masses, organomegaly  : Deferred  Musculoskeletal: extremities normal- no gross deformities noted, gait normal and normal muscle tone  Skin: no suspicious lesions or rashes  Neurologic: Gait normal. Reflexes normal and symmetric. Sensation grossly WNL.  Psychiatric: mentation appears normal and affect flat  Hematologic/Lymphatic/Immunologic: normal ant/post cervical, axillary, supraclavicular and inguinal nodes    Consults: GI and psychiatry  FEN: NPO  DVT prophylaxis: SCD  GI prophylaxis: protonix  BM prophylaxis: pericolace  Code Status:  full  Disposition: Psychiatry to evaluate patient to assess discharge safety, GI consult completed with dispo plan, tolerating po, stable labs and vitals    Signed:  Tyson Richardson PA-C   August 4, 2018 at 3:32 AM

## 2018-08-04 NOTE — PLAN OF CARE
Problem: Patient Care Overview  Goal: Plan of Care/Patient Progress Review  AVSS pt returned to unit from endoscopic  FB removal. Pt AXO, denies pain, VDSP on arrival, pt has attendant bedside-no family or friends present, pt informed plan for DC @ 1900, pt requesting to eat denies nausea-diet order pending NP updated . PIV@ 125mls/hr.

## 2018-08-04 NOTE — ED NOTES
Pt was discharged was waiting in the lobby area. Pt was then was called back and was told that she will be admitted to OBS.  All of pt's belongings were taken out of the room. Pt has 1:1 sitter due to high risks of swallowing foreign objects.

## 2018-08-04 NOTE — ANESTHESIA CARE TRANSFER NOTE
Patient: Nevin Alvarado    Procedure(s):   combined esophagoscopy, gastroscopy, duodenoscopy, REMOVE FOREIGN BODY  - Wound Class: II-Clean Contaminated    Diagnosis: Foreign body   Diagnosis Additional Information: No value filed.    Anesthesia Type:   General, RSI, ETT     Note:  Airway :Nasal Cannula  Patient transferred to:PACU  Comments: Anesthesia Care Transfer Note    Patient: Nevin Alvarado    Transferred to: PACU    Patient vital signs: stable    Airway: none    Monitors on, VSS, stable spontaneous respirations  Esteban Robledo CRNA  8/4/2018 2:55 PM      Handoff Report: Identifed the Patient, Identified the Reponsible Provider, Reviewed the pertinent medical history, Discussed the surgical course, Reviewed Intra-OP anesthesia mangement and issues during anesthesia, Set expectations for post-procedure period and Allowed opportunity for questions and acknowledgement of understanding      Vitals: (Last set prior to Anesthesia Care Transfer)    CRNA VITALS  8/4/2018 1420 - 8/4/2018 1455      8/4/2018             Resp Rate (observed): 21                Electronically Signed By: HU Felix CRNA  August 4, 2018  2:55 PM

## 2018-08-05 NOTE — PROGRESS NOTES
Pt dc'd home via cab provided by MOUNA LEMUS, tolerated Reg diet, denied pain, VDSP, dc orders reviewed w/ strong enc. To complete follow up

## 2018-08-13 ENCOUNTER — HOSPITAL ENCOUNTER (EMERGENCY)
Facility: CLINIC | Age: 28
Discharge: HOME | End: 2018-08-13
Attending: EMERGENCY MEDICINE | Admitting: EMERGENCY MEDICINE
Payer: MEDICARE

## 2018-08-13 ENCOUNTER — APPOINTMENT (OUTPATIENT)
Dept: GENERAL RADIOLOGY | Facility: CLINIC | Age: 28
End: 2018-08-13
Attending: EMERGENCY MEDICINE
Payer: MEDICARE

## 2018-08-13 VITALS
TEMPERATURE: 98.2 F | OXYGEN SATURATION: 97 % | DIASTOLIC BLOOD PRESSURE: 59 MMHG | SYSTOLIC BLOOD PRESSURE: 132 MMHG | RESPIRATION RATE: 18 BRPM

## 2018-08-13 DIAGNOSIS — T18.9XXA SWALLOWED FOREIGN BODY, INITIAL ENCOUNTER: ICD-10-CM

## 2018-08-13 PROCEDURE — 74019 RADEX ABDOMEN 2 VIEWS: CPT

## 2018-08-13 PROCEDURE — 99283 EMERGENCY DEPT VISIT LOW MDM: CPT

## 2018-08-13 RX ORDER — QUETIAPINE FUMARATE 25 MG/1
TABLET, FILM COATED ORAL
COMMUNITY
Start: 2018-08-09 | End: 2019-02-19

## 2018-08-13 ASSESSMENT — ENCOUNTER SYMPTOMS
VOMITING: 0
NAUSEA: 0

## 2018-08-13 NOTE — ED AVS SNAPSHOT
[facility]    [address]    [facility] [address]    Phone:  [phone]    Fax:  [phone]                                       Nevin Alvarado   MRN: [id number]    Department:  [facility]   Date of Visit:  8/13/2018           After Visit Summary Signature Page     I have received my discharge instructions, and my questions have been answered. I have discussed any challenges I see with this plan with the nurse or doctor.    ..........................................................................................................................................  Patient/Patient Representative Signature      ..........................................................................................................................................  Patient Representative Print Name and Relationship to Patient    ..................................................               ................................................  Date                                            Time    ..........................................................................................................................................  Reviewed by Signature/Title    ...................................................              ..............................................  Date                                                            Time

## 2018-08-13 NOTE — ED AVS SNAPSHOT
[facility]    [address] [place] [address]    Phone:  [phone]    Fax:  [phone]                                       Nevin Alvarado   MRN: [id number]    Department:  [facility]   Date of Visit:  8/13/2018           Patient Information     Date Of Birth          1990        Your diagnoses for this visit were:     Swallowed foreign body, initial encounter        You were seen by Mariam Sheehan MD.      Follow-up Information     Follow up with Latonya Knight MD. Go in 2 days.    Specialty:  Family Practice    Contact information:    [facility]  [address] [place] [id number] [phone]        24 Hour Appointment Hotline       To make an appointment at any [facility], call [phone] ([phone]). If you don't have a family doctor or clinic, we will help you find one. [place] [place] clinics are conveniently located to serve the needs of you and your family.             Review of your medicines      Our records show that you are taking the medicines listed below. If these are incorrect, please call your family doctor or clinic.        Dose / Directions Last dose taken    acetaminophen 325 MG tablet   Commonly known as:  TYLENOL   Dose:  650 mg   Quantity:  10 tablet        Take 2 tablets (650 mg) by mouth every 8 hours   Refills:  0        BENADRYL PO   Dose:  25-50 mg        Take 25-50 mg by mouth nightly as needed   Refills:  0        CLONIDINE HCL PO   Dose:  0.05 mg        Take 0.05 mg by mouth 2 times daily   Refills:  0        levonorgestrel 20 MCG/24HR IUD   Commonly known as:  MIRENA   Dose:  1 each        1 each (20 mcg) by Intrauterine route once for 1 dose   Refills:  0        lurasidone 20 MG Tabs tablet   Commonly known as:  LATUDA   Dose:  40 mg   Quantity:  12 tablet        Take 2 tablets (40 mg) by mouth every evening   Refills:  0        MELATONIN PO   Dose:  3 mg         Take 3 mg by mouth nightly as needed (sleep)   Refills:  0        omeprazole 40 MG capsule   Commonly known as:  priLOSEC   Dose:  40 mg   Quantity:  30 capsule        Take 1 capsule (40 mg) by mouth daily Take 30-60 minutes before a meal.   Refills:  0        PRISTIQ PO   Dose:  100 mg        Take 100 mg by mouth every morning   Refills:  0        QUEtiapine 25 MG tablet   Commonly known as:  SEROquel        Take 1/2 tablet up to twice daily as needed for anxiety.   Refills:  0        VITAMIN D3 PO   Dose:  2000 Units        Take 2,000 Units by mouth every morning   Refills:  0                Procedures and tests performed during your visit     Abdomen XR, 2 vw, flat and upright      Orders Needing Specimen Collection     None      Pending Results     Date and Time Order Name Status Description    8/13/2018 2015 Abdomen XR, 2 vw, flat and upright Preliminary             Pending Culture Results     No orders found from 8/11/2018 to 8/14/2018.            Pending Results Instructions     If you had any lab results that were not finalized at the time of your Discharge, you can call the ED Lab Result RN at [phone]. You will be contacted by this team for any positive Lab results or changes in treatment. The nurses are available 7 days a week from 10A to 6:30P.  You can leave a message 24 hours per day and they will return your call.        Test Results From Your Hospital Stay        8/13/2018  8:55 PM      Narrative     ABDOMEN VIEWS 8/13/2018 8:31 PM     HISTORY: Foreign body.    COMPARISON: None.        Impression     IMPRESSION: There is a radiopaque spring consistent with a foreign  body within the region of the stomach. There is a second radiopaque  spring projecting in the rectosigmoid region consistent with a foreign  body. This latter radiopaque spring results discussed with Dr. Sheehan.  Was presumably in the stomach on the 8/4/2018 study.                Clinical Quality Measure: Blood Pressure Screening      Your blood pressure was checked while you were in the emergency department today. The last reading we obtained was  BP: 141/88 . Please read the guidelines below about what these numbers mean and what you should do about them.  If your systolic blood pressure (the top number) is less than 120 and your diastolic blood pressure (the bottom number) is less than 80, then your blood pressure is normal. There is nothing more that you need to do about it.  If your systolic blood pressure (the top number) is 120-139 or your diastolic blood pressure (the bottom number) is 80-89, your blood pressure may be higher than it should be. You should have your blood pressure rechecked within a year by a primary care provider.  If your systolic blood pressure (the top number) is 140 or greater or your diastolic blood pressure (the bottom number) is 90 or greater, you may have high blood pressure. High blood pressure is treatable, but if left untreated over time it can put you at risk for heart attack, stroke, or kidney failure. You should have your blood pressure rechecked by a primary care provider within the next 4 weeks.  If your provider in the emergency department today gave you specific instructions to follow-up with your doctor or provider even sooner than that, you should follow that instruction and not wait for up to 4 weeks for your follow-up visit.        Thank you for choosing [facility]       Thank you for choosing [facility] for your care. Our goal is always to provide you with excellent care. Hearing back from our patients is one way we can continue to improve our services. Please take a few minutes to complete the written survey that you may receive in the mail after you visit with us. Thank you!        [facility] Information     [employer] gives you secure access to your electronic health record. If you see a primary care provider, you can also send messages to your care team and make appointments. If you have questions,  please call your primary care clinic.  If you do not have a primary care provider, please call [phone] and they will assist you.        Care EveryWhere ID     This is your Care EveryWhere ID. This could be used by other organizations to access your [facility] medical records  [id number]        Equal Access to Services     ZENON DIAMOND : Gabriel Collado, watay luqadaha, qaemmanuel kaalmagregory tenorio, mic persaud. So [facility] [phone].    ATENCIÓN: Si habla español, tiene a singh disposición servicios gratuitos de asistencia lingüística. Llame al [phone].    We comply with applicable federal civil rights laws and [place] laws. We do not discriminate on the basis of race, color, national origin, age, disability, sex, sexual orientation, or gender identity.            After Visit Summary       This is your record. Keep this with you and show to your community pharmacist(s) and doctor(s) at your next visit.

## 2018-08-14 NOTE — ED NOTES
Bed: ED08  Expected date:   Expected time:   Means of arrival:   Comments:  Hold for pt moving from 2

## 2018-08-14 NOTE — ED PROVIDER NOTES
History     Chief Complaint:  Foreign Body    HPI   Nevin Alvarado is a 27 year old female with a history of anxiety and foreign body ingestion who presents to the ED with a foreign body. The patient reports that she swallowed a pen spring last week due to anxiety, and she has not yet seen it in her stool. Earlier this evening, she ingested a similar spring and was feeling anxious, so she presented to the ED for evaluation. Here, the patient denies any nausea or vomiting, along with any thoughts of self-harm. Of note, she does not regularly see a GI doctor. Additionally, she reports that she is in the process of moving and has been under considerable stress recently.    Allergies:  Penicillins  Blood-group specific substance  Hydrocodone  Influenza vaccines  Oseltamivir  Vicodin  Cephalosporins  Lamotrigine  Latex    Medications:    Seroquel  Clonidine  Pristiq  Benadryl  Mirena  Latuda  Melatonin  Prilosec    Past Medical History:    ADD  Anorexia nervosa with bulimia  Anxiety  Depression  Borderline personality disorder  Self-harm  Morbid obesity  Migraine  PTSD  Rectal foreign body  Swallowed foreign body  Suicidal ideation    Past Surgical History:    EGD  Fecal impaction removal  Sigmoidoscopy  Release carpal tunnel  Laparoscopic ablation endometriosis  Mammoplasty reduction  Knee surgery    Family History:    Cerebrovascular disease    Social History:  Marital Status:  Single [1]  Negative for tobacco use.  Negative for alcohol use.    Review of Systems   Gastrointestinal: Negative for nausea and vomiting.   Psychiatric/Behavioral: Negative for self-injury and suicidal ideas.   All other systems reviewed and are negative.      Physical Exam     Patient Vitals for the past 24 hrs:   BP Temp Temp src Heart Rate Resp SpO2   08/13/18 2115 - - - - - 97 %   08/13/18 2100 132/59 - - - - 97 %   08/13/18 2045 125/57 - - - - 96 %   08/13/18 2005 141/88 98.2  F (36.8  C) Oral 94 18 98 %       Physical Exam    Constitutional: She appears well-developed and well-nourished.   HENT:   Right Ear: External ear normal.   Left Ear: External ear normal.   Mouth/Throat: Oropharynx is clear and moist. No oropharyngeal exudate.   Eyes: Conjunctivae are normal. Pupils are equal, round, and reactive to light. No scleral icterus.   Neck: Normal range of motion. Neck supple.   Cardiovascular: Normal rate, regular rhythm, normal heart sounds and intact distal pulses.  Exam reveals no gallop and no friction rub.    No murmur heard.  Pulmonary/Chest: Effort normal and breath sounds normal. No respiratory distress. She has no wheezes. She has no rales.   Abdominal: Soft. Bowel sounds are normal. She exhibits no distension and no mass. There is no tenderness.   Neurological: She is alert.   Skin: Skin is warm and dry. No rash noted.   Psychiatric:   Flat affect, denies SI       Emergency Department Course   Imaging:  Radiographic findings were communicated with the patient who voiced understanding of the findings.  XR Abdomen, 2 views, flat and upright:   There is a radiopaque spring consistent with a foreign  body within the region of the stomach. There is a second radiopaque  spring projecting in the rectosigmoid region consistent with a foreign  body. This latter radiopaque spring results discussed with Dr. Sheehan.  Was presumably in the stomach on the 8/4/2018 study, as per radiology.     Emergency Department Course:  Nursing notes and vitals reviewed. (2017) I performed an exam of the patient as documented above.     The patient was sent for an abdominal x-ray while in the emergency department, findings above.     2047 The x-ray results were called to me by Radiology.    (2056) I rechecked the patient and discussed the results of her workup thus far.     Findings and plan explained to the Patient. Patient discharged home with instructions regarding supportive care, medications, and reasons to return. The importance of close follow-up  was reviewed.     I personally reviewed the laboratory results with the Patient and answered all related questions prior to discharge.    Impression & Plan    Medical Decision Making:  The patient presents after ingesting a spring from a pen. The patient has done this multiple times. The x-ray does show a spring that is less than 6 cm in the stomach, but there is also one far down near the rectum. This may have been the one that she ingested 2 days ago, and it is making its way out. She has no abdominal pain and no signs of peritonitis . She has had this in the past and it looks like this one will likely pass as well. She has a therapist that she is following up with. She also denies any suicidal ideation, and I feel she is safe to be discharged. ED diagnosis is foreign body ingestion.    Diagnosis:    ICD-10-CM    1. Swallowed foreign body, initial encounter T18.9XXA        Disposition:  discharged to home    Scribe Disclosure:  I, Mavis Porter, am serving as a scribe on 8/13/2018 at 8:17 PM to personally document services performed by Mariam Sheehan MD based on my observations and the provider's statements to me.     Mavis Porter  8/13/2018   [facility] EMERGENCY DEPARTMENT       Mariam Sheehan MD  08/13/18 [id number]

## 2018-11-09 ENCOUNTER — ANESTHESIA - HEALTHEAST (OUTPATIENT)
Dept: SURGERY | Facility: CLINIC | Age: 28
End: 2018-11-09

## 2018-11-09 ENCOUNTER — SURGERY - HEALTHEAST (OUTPATIENT)
Dept: SURGERY | Facility: CLINIC | Age: 28
End: 2018-11-09

## 2018-11-09 ASSESSMENT — MIFFLIN-ST. JEOR: SCORE: 1830.39

## 2018-11-17 ENCOUNTER — SURGERY - HEALTHEAST (OUTPATIENT)
Dept: GASTROENTEROLOGY | Facility: CLINIC | Age: 28
End: 2018-11-17

## 2018-11-17 ASSESSMENT — MIFFLIN-ST. JEOR: SCORE: 1825.85

## 2018-11-22 ENCOUNTER — SURGERY - HEALTHEAST (OUTPATIENT)
Dept: SURGERY | Facility: CLINIC | Age: 28
End: 2018-11-22

## 2018-11-22 ENCOUNTER — ANESTHESIA - HEALTHEAST (OUTPATIENT)
Dept: SURGERY | Facility: CLINIC | Age: 28
End: 2018-11-22

## 2018-11-22 ASSESSMENT — MIFFLIN-ST. JEOR: SCORE: 1839.46

## 2018-11-25 ENCOUNTER — ANESTHESIA - HEALTHEAST (OUTPATIENT)
Dept: SURGERY | Facility: CLINIC | Age: 28
End: 2018-11-25

## 2018-11-25 ENCOUNTER — SURGERY - HEALTHEAST (OUTPATIENT)
Dept: SURGERY | Facility: CLINIC | Age: 28
End: 2018-11-25

## 2018-11-25 ASSESSMENT — MIFFLIN-ST. JEOR
SCORE: 1766.88
SCORE: 1828.12

## 2018-11-27 ASSESSMENT — MIFFLIN-ST. JEOR: SCORE: 1845.36

## 2018-11-28 ASSESSMENT — MIFFLIN-ST. JEOR: SCORE: 1856.7

## 2018-12-13 ENCOUNTER — COMMUNICATION - HEALTHEAST (OUTPATIENT)
Dept: BEHAVIORAL HEALTH | Facility: CLINIC | Age: 28
End: 2018-12-13

## 2019-01-05 ENCOUNTER — SURGERY - HEALTHEAST (OUTPATIENT)
Dept: GASTROENTEROLOGY | Facility: CLINIC | Age: 29
End: 2019-01-05

## 2019-01-25 ENCOUNTER — ANESTHESIA - HEALTHEAST (OUTPATIENT)
Dept: SURGERY | Facility: CLINIC | Age: 29
End: 2019-01-25

## 2019-01-25 ENCOUNTER — SURGERY - HEALTHEAST (OUTPATIENT)
Dept: SURGERY | Facility: CLINIC | Age: 29
End: 2019-01-25

## 2019-01-25 ASSESSMENT — MIFFLIN-ST. JEOR: SCORE: 1839.46

## 2019-01-26 ASSESSMENT — MIFFLIN-ST. JEOR: SCORE: 1853.97

## 2019-01-31 ENCOUNTER — ANESTHESIA - HEALTHEAST (OUTPATIENT)
Dept: SURGERY | Facility: CLINIC | Age: 29
End: 2019-01-31

## 2019-01-31 ENCOUNTER — SURGERY - HEALTHEAST (OUTPATIENT)
Dept: SURGERY | Facility: CLINIC | Age: 29
End: 2019-01-31

## 2019-01-31 ASSESSMENT — MIFFLIN-ST. JEOR
SCORE: 1835.83
SCORE: 1839.46

## 2019-02-07 NOTE — ED PROVIDER NOTES
History     Chief Complaint:  Abdominal Pain    HPI   Nevin Alvarado is a 26 year old female with a history of chronic pain, PTSD, depression, personality disorder, and history of ingesting foreign bodies requiring removal, once in OR with sigmoidoscopy and once in ER who presents with abdominal pain. The patient was seen at the [facility] two days ago after eating staples and underwent an endoscopy for removal. Upon presentation she complains of sharp right lower quadrant abdominal pain for the past five days which radiates to her back as well as associated nausea and bloody stools yesterday. She states her stools are soft with streaks of blood. Patient has not taken anything for pain. The patient denies any diarrhea, vomiting, dysuria, hematuria, frequent urination or recent falls/injuries.    Allergies:  Augmentin  Blood-group Specific Substance  Hydrocodone  Influenza Vaccines  Cephalosporins  Vicodin  Oseltamivir     Medications:    docusate sodium (COLACE) 100 MG tablet  senna (SENOKOT) 8.6 MG tablet  oxyCODONE-acetaminophen (PERCOCET) 5-325 MG per tablet  diazepam (VALIUM) 5 MG tablet  oxycodone-acetaminophen (PERCOCET) 5-325 MG per tablet  diazepam (VALIUM) 5 MG tablet  methylprednisolone (MEDROL DOSEPAK) 4 MG tablet  senna-docusate (SENOKOT-S;LAQUITA COLACE) 8.6-50 MG per tablet  polyethylene glycol (SABI LAX/GLYCOLAX) powder  SUMATRIPTAN SUCCINATE PO  TOPIRAMATE PO  ondansetron (ZOFRAN) 4 MG tablet  Desvenlafaxine Succinate (PRISTIQ PO)  Cholecalciferol (VITAMIN D3 PO)     Past Medical History:    ADD (attention deficit disorder)   Anorexia nervosa with bulimia   Anxiety   Borderline personality disorder   Depression   H/O self-harm   H/O sigmoidoscopy   H/O swallowed foreign body   History of laparotomy   Lives in independent group home   Migraine without aura   Morbid obesity (H)   PTSD (post-traumatic stress disorder)   Rectal foreign body   Ovarian cyst     Past Surgical History:    Mammoplasty  "reduction   Release carpal tunnel   Knee surgery   Laparoscopic ablation endometriosis    Hc remove fecal impaction or fb w anesthesia   Exam under anesthesia anus   Laparotomy exploratory   Sigmoidoscopy flexible   Lymph nodes removed from neck; benign    Esophagoscopy, gastroscopy, duodenoscopy (egd), combined    Family History:    Cardiovascular disease: father    Social History:  Smoking Status: Never smoker  Smokeless Tobacco: Never used  Alcohol Use: No  Marital Status:  Single [1]     Review of Systems   Gastrointestinal: Positive for abdominal pain and blood in stool. Negative for diarrhea and vomiting.   Genitourinary: Negative.    All other systems reviewed and are negative.    Physical Exam   First Vitals:  BP: 143/74  Pulse: 98  Heart Rate: 98  Temp: 98  F (36.7  C)  Resp: 18  Height: 157.5 cm (5' 2\")  Weight: 103.4 kg (228 lb)  SpO2: 99 %      Physical Exam  Vital signs and nursing notes reviewed.     Constitutional: laying on gurney appears comfortable  HENT: Oropharynx is clear and moist  Eyes: Conjunctivae are normal bilaterally. Pupils equal  Neck: normal range of motion  Cardiovascular: Normal rate, regular rhythm, normal heart sounds.   Pulmonary/Chest: Effort normal and breath sounds normal. No respiratory distress.   Abdominal: Soft. Bowel sounds are normal. Discomfort in the abdomen, appears to be a the right iliac crest area. No definable intraabdominal pain  on examination.  No rebound or guarding. Describes  tenderness at the lower abdomen but appears to the be at the area overlying right anterior superior iliac wing.  Rectal: No mass noted. 1 cm anal fissure at the 4 o'clock position at anus. No active bleeding at this time.   Musculoskeletal: No joint swelling or edema.   Neurological: Alert and oriented. No focal weakness  Skin: Skin is warm and dry. No overlying rash to suggest shingles or bruising at the area of pain to suggest obvious injury.  Psych: normal affect    Emergency " Barbara (PGY-1); [facility] Department Course     Imaging:  Radiographic findings were communicated with the patient who voiced understanding of the findings.    Abdominal XR:  No evidence for bowel obstruction or free air. As per radiology.     Laboratory:  CBC: WBC: 11.5(H), HGB: 12.0, PLT: 241  BMP: o/w WNL (Creat 0.61, glucose 94)    UA with Microscopic: Mucous present, few calcium oxalate, many amorphous crystals, o/w WNL.  HG qualitative: Negative    Interventions:  0819 NS 1 L IV   Zofran 4 mg IV  0906 Percocet 1 tablet PO    Emergency Department Course:  Nursing notes and vitals reviewed. I performed an exam of the patient as documented above.     The patient was sent for a abdominal xray while here in the emergency department, findings above.    Blood drawn. This was sent to the lab for further testing, results above.    The patient provided a urine sample here in the emergency department. This was sent for laboratory testing, findings above.    I reassessed the patient.     Findings and plan explained to the Patient. Patient discharged home with instructions regarding supportive care, medications, and reasons to return. The importance of close follow-up was reviewed.     Impression & Plan      Medical Decision Making:  Nevin Alvarado is a 26 year old female who presents with discomfort in the right side of her abdomen located at the iliac wing area. There is no sign of obvious intraabdominal pathology causing her pain. She had also reported bloody stools after having a bowel movement and wiping with tissue paper. Findings on rectal exam are consistent an anal fissure which is the source of her bleeding. She recently had a CT scan of her abdomen for her right lower abdominal pain approximately five days ago which was negative except for left ovarian cyst. She currently has no left side pain, and is not pregnant. Her lab tests and vital signs are unremarkable and therefore I do not feel that she needs advanced imaging or other  testing at this time. She should continue taking antiinflammatory, she got a muscle relaxer as needed for discomfort and she will follow up with her primary care physician as an outpatient. Patient was also placed on stool softeners for anal fissure and to try an avoid constipation.     Diagnosis:    ICD-10-CM    1. RLQ abdominal pain R10.31    2. Anal fissure K60.2        Disposition:  discharged to home    Discharge Medications:  Discharge Medication List as of 3/12/2017 10:01 AM      START taking these medications    Details   ibuprofen (ADVIL/MOTRIN) 800 MG tablet Take 1 tablet (800 mg) by mouth every 8 hours as needed for moderate pain, Disp-20 tablet, R-0, Local Print      methocarbamol (ROBAXIN) 750 MG tablet Take 1 tablet (750 mg) by mouth 4 times daily as needed for muscle spasms, Disp-20 tablet, R-0, Local Print      docusate sodium (COLACE) 100 MG capsule Take 1 capsule (100 mg) by mouth 2 times daily, Disp-20 capsule, R-0, Local Print             IBecca, am serving as a scribe on 3/12/2017 at 7:02 AM to personally document services performed by Ilya Snowden MD based on my observations and the provider's statements to me.     Becca Varela  3/12/2017   [facility] EMERGENCY DEPARTMENT       Ilya Snowden MD  03/13/17 1143

## 2019-02-18 ENCOUNTER — APPOINTMENT (OUTPATIENT)
Dept: GENERAL RADIOLOGY | Facility: CLINIC | Age: 29
DRG: 394 | End: 2019-02-18
Payer: MEDICARE

## 2019-02-18 ENCOUNTER — APPOINTMENT (OUTPATIENT)
Dept: GENERAL RADIOLOGY | Facility: CLINIC | Age: 29
DRG: 394 | End: 2019-02-18
Attending: EMERGENCY MEDICINE
Payer: MEDICARE

## 2019-02-18 ENCOUNTER — HOSPITAL ENCOUNTER (INPATIENT)
Facility: CLINIC | Age: 29
LOS: 1 days | Discharge: HOME | DRG: 394 | End: 2019-02-21
Attending: EMERGENCY MEDICINE | Admitting: COLON & RECTAL SURGERY
Payer: MEDICARE

## 2019-02-18 DIAGNOSIS — T18.5XXA RECTAL FOREIGN BODY, INITIAL ENCOUNTER: ICD-10-CM

## 2019-02-18 LAB
ANION GAP SERPL CALCULATED.3IONS-SCNC: 6 MMOL/L (ref 3–14)
BASOPHILS # BLD AUTO: 0 10E9/L (ref 0–0.2)
BASOPHILS NFR BLD AUTO: 0.1 %
BUN SERPL-MCNC: 11 MG/DL (ref 7–30)
CALCIUM SERPL-MCNC: 8.6 MG/DL (ref 8.5–10.1)
CHLORIDE SERPL-SCNC: 109 MMOL/L (ref 94–109)
CO2 SERPL-SCNC: 25 MMOL/L (ref 20–32)
CREAT SERPL-MCNC: 0.52 MG/DL (ref 0.52–1.04)
DIFFERENTIAL METHOD BLD: ABNORMAL
EOSINOPHIL # BLD AUTO: 0 10E9/L (ref 0–0.7)
EOSINOPHIL NFR BLD AUTO: 0 %
ERYTHROCYTE [DISTWIDTH] IN BLOOD BY AUTOMATED COUNT: 14.6 % (ref 10–15)
GFR SERPL CREATININE-BSD FRML MDRD: >90 ML/MIN/{1.73_M2}
GLUCOSE SERPL-MCNC: 178 MG/DL (ref 70–99)
HCT VFR BLD AUTO: 39.5 % (ref 35–47)
HGB BLD-MCNC: 12.8 G/DL (ref 11.7–15.7)
IMM GRANULOCYTES # BLD: 0.1 10E9/L (ref 0–0.4)
IMM GRANULOCYTES NFR BLD: 0.5 %
LYMPHOCYTES # BLD AUTO: 1.4 10E9/L (ref 0.8–5.3)
LYMPHOCYTES NFR BLD AUTO: 9.4 %
MCH RBC QN AUTO: 29.4 PG (ref 26.5–33)
MCHC RBC AUTO-ENTMCNC: 32.4 G/DL (ref 31.5–36.5)
MCV RBC AUTO: 91 FL (ref 78–100)
MONOCYTES # BLD AUTO: 1 10E9/L (ref 0–1.3)
MONOCYTES NFR BLD AUTO: 6.7 %
NEUTROPHILS # BLD AUTO: 12.7 10E9/L (ref 1.6–8.3)
NEUTROPHILS NFR BLD AUTO: 83.3 %
NRBC # BLD AUTO: 0 10*3/UL
NRBC BLD AUTO-RTO: 0 /100
PLATELET # BLD AUTO: 300 10E9/L (ref 150–450)
POTASSIUM SERPL-SCNC: 3.7 MMOL/L (ref 3.4–5.3)
RBC # BLD AUTO: 4.36 10E12/L (ref 3.8–5.2)
SODIUM SERPL-SCNC: 140 MMOL/L (ref 133–144)
WBC # BLD AUTO: 15.3 10E9/L (ref 4–11)

## 2019-02-18 PROCEDURE — 25000128 H RX IP 250 OP 636: Performed by: EMERGENCY MEDICINE

## 2019-02-18 PROCEDURE — 99291 CRITICAL CARE FIRST HOUR: CPT | Mod: 25 | Performed by: EMERGENCY MEDICINE

## 2019-02-18 PROCEDURE — 0DJD7ZZ INSPECTION OF LOWER INTESTINAL TRACT, VIA NATURAL OR ARTIFICIAL OPENING: ICD-10-PCS | Performed by: EMERGENCY MEDICINE

## 2019-02-18 PROCEDURE — 96374 THER/PROPH/DIAG INJ IV PUSH: CPT | Performed by: EMERGENCY MEDICINE

## 2019-02-18 PROCEDURE — 96376 TX/PRO/DX INJ SAME DRUG ADON: CPT | Performed by: EMERGENCY MEDICINE

## 2019-02-18 PROCEDURE — 99152 MOD SED SAME PHYS/QHP 5/>YRS: CPT | Performed by: EMERGENCY MEDICINE

## 2019-02-18 PROCEDURE — 85025 COMPLETE CBC W/AUTO DIFF WBC: CPT | Performed by: EMERGENCY MEDICINE

## 2019-02-18 PROCEDURE — 72170 X-RAY EXAM OF PELVIS: CPT

## 2019-02-18 PROCEDURE — A9270 NON-COVERED ITEM OR SERVICE: HCPCS | Mod: GY | Performed by: COLON & RECTAL SURGERY

## 2019-02-18 PROCEDURE — 99152 MOD SED SAME PHYS/QHP 5/>YRS: CPT | Mod: Z6 | Performed by: EMERGENCY MEDICINE

## 2019-02-18 PROCEDURE — 25000132 ZZH RX MED GY IP 250 OP 250 PS 637: Mod: GY | Performed by: COLON & RECTAL SURGERY

## 2019-02-18 PROCEDURE — 25800030 ZZH RX IP 258 OP 636: Performed by: EMERGENCY MEDICINE

## 2019-02-18 PROCEDURE — 72170 X-RAY EXAM OF PELVIS: CPT | Mod: 76

## 2019-02-18 PROCEDURE — 99285 EMERGENCY DEPT VISIT HI MDM: CPT | Mod: 25 | Performed by: EMERGENCY MEDICINE

## 2019-02-18 PROCEDURE — 80048 BASIC METABOLIC PNL TOTAL CA: CPT | Performed by: EMERGENCY MEDICINE

## 2019-02-18 PROCEDURE — 25000125 ZZHC RX 250: Performed by: EMERGENCY MEDICINE

## 2019-02-18 PROCEDURE — 96375 TX/PRO/DX INJ NEW DRUG ADDON: CPT | Performed by: EMERGENCY MEDICINE

## 2019-02-18 PROCEDURE — 96361 HYDRATE IV INFUSION ADD-ON: CPT | Performed by: EMERGENCY MEDICINE

## 2019-02-18 PROCEDURE — 0DJD8ZZ INSPECTION OF LOWER INTESTINAL TRACT, VIA NATURAL OR ARTIFICIAL OPENING ENDOSCOPIC: ICD-10-PCS | Performed by: COLON & RECTAL SURGERY

## 2019-02-18 RX ORDER — CLONIDINE HYDROCHLORIDE 0.1 MG/1
0.05 TABLET ORAL 2 TIMES DAILY
COMMUNITY
Start: 2018-09-17 | End: 2019-02-19

## 2019-02-18 RX ORDER — DESVENLAFAXINE 100 MG/1
100 TABLET, EXTENDED RELEASE ORAL DAILY
COMMUNITY
Start: 2018-06-18 | End: 2019-02-19

## 2019-02-18 RX ORDER — KETAMINE HYDROCHLORIDE 10 MG/ML
100 INJECTION, SOLUTION INTRAMUSCULAR; INTRAVENOUS ONCE
Status: COMPLETED | OUTPATIENT
Start: 2019-02-18 | End: 2019-02-18

## 2019-02-18 RX ORDER — DIPHENHYDRAMINE HCL 25 MG
25-50 CAPSULE ORAL EVERY 6 HOURS PRN
Status: CANCELLED | OUTPATIENT
Start: 2019-02-18

## 2019-02-18 RX ORDER — HYDROMORPHONE HYDROCHLORIDE 1 MG/ML
0.5 INJECTION, SOLUTION INTRAMUSCULAR; INTRAVENOUS; SUBCUTANEOUS ONCE
Status: COMPLETED | OUTPATIENT
Start: 2019-02-18 | End: 2019-02-18

## 2019-02-18 RX ORDER — METOCLOPRAMIDE HYDROCHLORIDE 5 MG/ML
5 INJECTION INTRAMUSCULAR; INTRAVENOUS ONCE
Status: COMPLETED | OUTPATIENT
Start: 2019-02-18 | End: 2019-02-18

## 2019-02-18 RX ORDER — SODIUM CHLORIDE 9 MG/ML
INJECTION, SOLUTION INTRAVENOUS CONTINUOUS
Status: DISCONTINUED | OUTPATIENT
Start: 2019-02-18 | End: 2019-02-19 | Stop reason: DRUGHIGH

## 2019-02-18 RX ORDER — HYDROMORPHONE HYDROCHLORIDE 1 MG/ML
1 INJECTION, SOLUTION INTRAMUSCULAR; INTRAVENOUS; SUBCUTANEOUS ONCE
Status: COMPLETED | OUTPATIENT
Start: 2019-02-18 | End: 2019-02-18

## 2019-02-18 RX ORDER — HYDROMORPHONE HYDROCHLORIDE 1 MG/ML
0.5 INJECTION, SOLUTION INTRAMUSCULAR; INTRAVENOUS; SUBCUTANEOUS ONCE
Status: DISCONTINUED | OUTPATIENT
Start: 2019-02-18 | End: 2019-02-18

## 2019-02-18 RX ORDER — LURASIDONE HYDROCHLORIDE 40 MG/1
40 TABLET, FILM COATED ORAL
Status: ON HOLD | COMMUNITY
Start: 2018-12-04 | End: 2022-02-16

## 2019-02-18 RX ORDER — ALBUTEROL SULFATE 90 UG/1
2 AEROSOL, METERED RESPIRATORY (INHALATION) 4 TIMES DAILY PRN
Status: ON HOLD | COMMUNITY
End: 2020-03-30

## 2019-02-18 RX ORDER — QUETIAPINE FUMARATE 25 MG/1
25 TABLET, FILM COATED ORAL 2 TIMES DAILY PRN
Status: CANCELLED | OUTPATIENT
Start: 2019-02-18

## 2019-02-18 RX ORDER — POLYETHYLENE GLYCOL 3350 17 G/17G
34 POWDER, FOR SOLUTION ORAL ONCE
Status: COMPLETED | OUTPATIENT
Start: 2019-02-18 | End: 2019-02-18

## 2019-02-18 RX ORDER — KETOROLAC TROMETHAMINE 30 MG/ML
30 INJECTION, SOLUTION INTRAMUSCULAR; INTRAVENOUS ONCE
Status: COMPLETED | OUTPATIENT
Start: 2019-02-18 | End: 2019-02-18

## 2019-02-18 RX ORDER — GABAPENTIN 600 MG/1
600 TABLET ORAL 3 TIMES DAILY
COMMUNITY
Start: 2018-11-15 | End: 2020-07-11

## 2019-02-18 RX ADMIN — POLYETHYLENE GLYCOL 3350 34 G: 17 POWDER, FOR SOLUTION ORAL at 23:38

## 2019-02-18 RX ADMIN — SODIUM CHLORIDE: 9 INJECTION, SOLUTION INTRAVENOUS at 22:30

## 2019-02-18 RX ADMIN — HYDROMORPHONE HYDROCHLORIDE 1 MG: 1 INJECTION, SOLUTION INTRAMUSCULAR; INTRAVENOUS; SUBCUTANEOUS at 23:20

## 2019-02-18 RX ADMIN — METOCLOPRAMIDE 5 MG: 5 INJECTION, SOLUTION INTRAMUSCULAR; INTRAVENOUS at 23:30

## 2019-02-18 RX ADMIN — KETOROLAC TROMETHAMINE 30 MG: 30 INJECTION INTRAMUSCULAR; INTRAVENOUS at 20:31

## 2019-02-18 RX ADMIN — KETAMINE HYDROCHLORIDE 75 MG: 10 INJECTION, SOLUTION INTRAMUSCULAR; INTRAVENOUS at 22:53

## 2019-02-18 RX ADMIN — Medication 0.5 MG: at 22:30

## 2019-02-18 RX ADMIN — MIDAZOLAM 0.5 MG: 1 INJECTION INTRAMUSCULAR; INTRAVENOUS at 22:50

## 2019-02-18 ASSESSMENT — MIFFLIN-ST. JEOR: SCORE: 1867.6

## 2019-02-18 NOTE — ED TRIAGE NOTES
Presents with glass nail polish bottle in the rectum about 2 hours ago. Patient states the bottle is about 3-4 inches long. Patient c/o rectum/back pain post insertion, denies rectal bleeding. Denies suicidal ideation or intent to harm, states she was masturbating at the time.

## 2019-02-19 PROCEDURE — 96376 TX/PRO/DX INJ SAME DRUG ADON: CPT

## 2019-02-19 PROCEDURE — 25000128 H RX IP 250 OP 636

## 2019-02-19 PROCEDURE — G0378 HOSPITAL OBSERVATION PER HR: HCPCS

## 2019-02-19 PROCEDURE — 25000132 ZZH RX MED GY IP 250 OP 250 PS 637: Mod: GY | Performed by: COLON & RECTAL SURGERY

## 2019-02-19 PROCEDURE — 25000128 H RX IP 250 OP 636: Performed by: COLON & RECTAL SURGERY

## 2019-02-19 PROCEDURE — 25000132 ZZH RX MED GY IP 250 OP 250 PS 637: Mod: GY | Performed by: EMERGENCY MEDICINE

## 2019-02-19 PROCEDURE — 96375 TX/PRO/DX INJ NEW DRUG ADDON: CPT

## 2019-02-19 PROCEDURE — 25800030 ZZH RX IP 258 OP 636: Performed by: COLON & RECTAL SURGERY

## 2019-02-19 PROCEDURE — A9270 NON-COVERED ITEM OR SERVICE: HCPCS | Mod: GY | Performed by: COLON & RECTAL SURGERY

## 2019-02-19 RX ORDER — HYDROMORPHONE HCL/0.9% NACL/PF 0.2MG/0.2
0.2 SYRINGE (ML) INTRAVENOUS
Status: DISCONTINUED | OUTPATIENT
Start: 2019-02-19 | End: 2019-02-19 | Stop reason: CLARIF

## 2019-02-19 RX ORDER — SODIUM CHLORIDE, SODIUM LACTATE, POTASSIUM CHLORIDE, CALCIUM CHLORIDE 600; 310; 30; 20 MG/100ML; MG/100ML; MG/100ML; MG/100ML
INJECTION, SOLUTION INTRAVENOUS CONTINUOUS
Status: DISCONTINUED | OUTPATIENT
Start: 2019-02-19 | End: 2019-02-21

## 2019-02-19 RX ORDER — CLONIDINE HYDROCHLORIDE 0.1 MG/1
0.05 TABLET ORAL 2 TIMES DAILY
Status: DISCONTINUED | OUTPATIENT
Start: 2019-02-19 | End: 2019-02-21 | Stop reason: HOSPADM

## 2019-02-19 RX ORDER — IBUPROFEN 600 MG/1
600 TABLET, FILM COATED ORAL EVERY 6 HOURS PRN
Status: DISCONTINUED | OUTPATIENT
Start: 2019-02-19 | End: 2019-02-20

## 2019-02-19 RX ORDER — DESVENLAFAXINE 50 MG/1
100 TABLET, FILM COATED, EXTENDED RELEASE ORAL EVERY MORNING
Status: DISCONTINUED | OUTPATIENT
Start: 2019-02-19 | End: 2019-02-21 | Stop reason: HOSPADM

## 2019-02-19 RX ORDER — LURASIDONE HYDROCHLORIDE 40 MG/1
40 TABLET, FILM COATED ORAL EVERY EVENING
Status: DISCONTINUED | OUTPATIENT
Start: 2019-02-19 | End: 2019-02-20

## 2019-02-19 RX ORDER — DIPHENHYDRAMINE HCL 25 MG
25 CAPSULE ORAL ONCE
Status: COMPLETED | OUTPATIENT
Start: 2019-02-19 | End: 2019-02-19

## 2019-02-19 RX ORDER — ACETAMINOPHEN 325 MG/1
650 TABLET ORAL EVERY 4 HOURS PRN
Status: DISCONTINUED | OUTPATIENT
Start: 2019-02-19 | End: 2019-02-20

## 2019-02-19 RX ORDER — HYDROMORPHONE HYDROCHLORIDE 1 MG/ML
INJECTION, SOLUTION INTRAMUSCULAR; INTRAVENOUS; SUBCUTANEOUS
Status: COMPLETED
Start: 2019-02-19 | End: 2019-02-19

## 2019-02-19 RX ORDER — PROMETHAZINE HYDROCHLORIDE 25 MG/1
25 TABLET ORAL EVERY 6 HOURS PRN
Status: DISCONTINUED | OUTPATIENT
Start: 2019-02-19 | End: 2019-02-21 | Stop reason: HOSPADM

## 2019-02-19 RX ORDER — PROMETHAZINE HYDROCHLORIDE 25 MG/ML
12.5 INJECTION, SOLUTION INTRAMUSCULAR; INTRAVENOUS EVERY 6 HOURS PRN
Status: DISCONTINUED | OUTPATIENT
Start: 2019-02-19 | End: 2019-02-21 | Stop reason: HOSPADM

## 2019-02-19 RX ORDER — ONDANSETRON 4 MG/1
4 TABLET, ORALLY DISINTEGRATING ORAL EVERY 6 HOURS PRN
Status: DISCONTINUED | OUTPATIENT
Start: 2019-02-19 | End: 2019-02-19

## 2019-02-19 RX ORDER — PROCHLORPERAZINE MALEATE 5 MG
10 TABLET ORAL EVERY 6 HOURS PRN
Status: DISCONTINUED | OUTPATIENT
Start: 2019-02-19 | End: 2019-02-19

## 2019-02-19 RX ORDER — NALOXONE HYDROCHLORIDE 0.4 MG/ML
.1-.4 INJECTION, SOLUTION INTRAMUSCULAR; INTRAVENOUS; SUBCUTANEOUS
Status: DISCONTINUED | OUTPATIENT
Start: 2019-02-19 | End: 2019-02-21 | Stop reason: HOSPADM

## 2019-02-19 RX ORDER — PROCHLORPERAZINE 25 MG
25 SUPPOSITORY, RECTAL RECTAL EVERY 12 HOURS PRN
Status: DISCONTINUED | OUTPATIENT
Start: 2019-02-19 | End: 2019-02-19

## 2019-02-19 RX ORDER — GABAPENTIN 300 MG/1
300 CAPSULE ORAL 2 TIMES DAILY
Status: DISCONTINUED | OUTPATIENT
Start: 2019-02-19 | End: 2019-02-21 | Stop reason: HOSPADM

## 2019-02-19 RX ORDER — ONDANSETRON 2 MG/ML
4 INJECTION INTRAMUSCULAR; INTRAVENOUS EVERY 6 HOURS PRN
Status: DISCONTINUED | OUTPATIENT
Start: 2019-02-19 | End: 2019-02-19

## 2019-02-19 RX ORDER — LIDOCAINE 40 MG/G
CREAM TOPICAL
Status: DISCONTINUED | OUTPATIENT
Start: 2019-02-19 | End: 2019-02-21 | Stop reason: HOSPADM

## 2019-02-19 RX ORDER — HYDROMORPHONE HYDROCHLORIDE 1 MG/ML
0.2 INJECTION, SOLUTION INTRAMUSCULAR; INTRAVENOUS; SUBCUTANEOUS
Status: DISCONTINUED | OUTPATIENT
Start: 2019-02-19 | End: 2019-02-21 | Stop reason: HOSPADM

## 2019-02-19 RX ADMIN — Medication 0.2 MG: at 08:11

## 2019-02-19 RX ADMIN — PROMETHAZINE HYDROCHLORIDE 12.5 MG: 25 INJECTION INTRAMUSCULAR; INTRAVENOUS at 08:54

## 2019-02-19 RX ADMIN — DIPHENHYDRAMINE HYDROCHLORIDE 25 MG: 25 CAPSULE ORAL at 00:27

## 2019-02-19 RX ADMIN — SODIUM CHLORIDE, POTASSIUM CHLORIDE, SODIUM LACTATE AND CALCIUM CHLORIDE: 600; 310; 30; 20 INJECTION, SOLUTION INTRAVENOUS at 00:27

## 2019-02-19 RX ADMIN — Medication 0.2 MG: at 18:16

## 2019-02-19 RX ADMIN — PROCHLORPERAZINE MALEATE 10 MG: 10 TABLET, FILM COATED ORAL at 04:01

## 2019-02-19 RX ADMIN — Medication 0.2 MG: at 16:13

## 2019-02-19 RX ADMIN — DESVENLAFAXINE 100 MG: 50 TABLET, FILM COATED, EXTENDED RELEASE ORAL at 08:15

## 2019-02-19 RX ADMIN — Medication 0.2 MG: at 01:19

## 2019-02-19 RX ADMIN — ONDANSETRON 4 MG: 2 INJECTION INTRAMUSCULAR; INTRAVENOUS at 05:05

## 2019-02-19 RX ADMIN — Medication 0.2 MG: at 06:01

## 2019-02-19 RX ADMIN — CLONIDINE HYDROCHLORIDE 0.05 MG: 0.1 TABLET ORAL at 21:01

## 2019-02-19 RX ADMIN — GABAPENTIN 300 MG: 300 CAPSULE ORAL at 08:15

## 2019-02-19 RX ADMIN — Medication 0.2 MG: at 03:16

## 2019-02-19 RX ADMIN — CLONIDINE HYDROCHLORIDE 0.05 MG: 0.1 TABLET ORAL at 00:42

## 2019-02-19 RX ADMIN — SODIUM CHLORIDE, POTASSIUM CHLORIDE, SODIUM LACTATE AND CALCIUM CHLORIDE: 600; 310; 30; 20 INJECTION, SOLUTION INTRAVENOUS at 13:44

## 2019-02-19 RX ADMIN — IBUPROFEN 600 MG: 600 TABLET ORAL at 12:19

## 2019-02-19 RX ADMIN — GABAPENTIN 300 MG: 300 CAPSULE ORAL at 21:01

## 2019-02-19 RX ADMIN — Medication 0.2 MG: at 22:30

## 2019-02-19 RX ADMIN — LURASIDONE HYDROCHLORIDE 40 MG: 40 TABLET, FILM COATED ORAL at 21:01

## 2019-02-19 RX ADMIN — Medication 0.2 MG: at 10:53

## 2019-02-19 RX ADMIN — Medication 0.2 MG: at 13:13

## 2019-02-19 RX ADMIN — PROMETHAZINE HYDROCHLORIDE 25 MG: 25 TABLET ORAL at 21:02

## 2019-02-19 RX ADMIN — Medication 0.2 MG: at 20:38

## 2019-02-19 RX ADMIN — CLONIDINE HYDROCHLORIDE 0.05 MG: 0.1 TABLET ORAL at 08:54

## 2019-02-19 ASSESSMENT — PAIN DESCRIPTION - DESCRIPTORS
DESCRIPTORS: ACHING;CRAMPING
DESCRIPTORS: ACHING;CRAMPING

## 2019-02-19 NOTE — PROGRESS NOTES
Colorectal Surgery Progress Note    Subjective:  Continues to complain of nausea and rectal pain. Vitals stable. No BM overnight.     Vitals:  Vitals:    02/19/19 0230 02/19/19 0315 02/19/19 0529 02/19/19 0730   BP:  132/85  119/70   BP Location:    Left arm   Pulse:  85     Resp:  16 16 16   Temp:   98  F (36.7  C) 97.7  F (36.5  C)   TempSrc:   Oral Oral   SpO2: 96% 99%  97%   Weight:       Height:         I/O:  I/O last 3 completed shifts:  In: 239.58 [I.V.:239.58]  Out: -     Physical Exam:  Gen: AAOx3, NAD  Pulm: Non-labored breathing  Abd: Soft, non-distended, non tender, well healed midline scar  Rectal: no blood, good rectal tone, unable to palpate foreign object  Ext:  Warm and well-perfused    BMP  Recent Labs   Lab 02/18/19 2023      POTASSIUM 3.7   CHLORIDE 109   CO2 25   BUN 11   CR 0.52   *     CBC  Recent Labs   Lab 02/18/19 2023   WBC 15.3*   HGB 12.8   HCT 39.5            ASSESSMENT: This is a 28 year old female who presented with a rectal foreign object.     NPO. IVF.  PRN medications for pain and nausea.   Continue home psych medications.  Patient requires 1:1 supervision as history of swallowing objects while in hospital.   Will trial enemas and bowel regimen to try and get patient to pass spontaneously.   Serial abdominal exams.   Will coordinate foreign body removal in endoscopy or the OR if unsuccessful or evidence of perforation.       Patient was discussed with Dr. Alejandro Wyatt MD   Colon and Rectal Surgery Fellow

## 2019-02-19 NOTE — PROGRESS NOTES
Emergency Social Work Services Note    Date of  Intervention: 02/18/19  Last Emergency Department Visit:  2/17/19 at [facility] and then again 2/17/19 at [facility] Plan:  No.    Collaborated with:  Dr. Velasquez; patient; CARMEN Muir    Data:  Pt presented to the ED after placing a nail polish bottle in her rectum from masturbating.  Pt is well-known to this ED.  She has had multiple ED visits at various outside hospitals as well.  Significant history of swallowing foreign objects and placing objects in her rectum.    Intervention:  Chart reviewed.  Pt has a significant mental health history and has community disability and mental health supports in place.  SW met with pt, introduced self and role.  Pt tells writer that she lives alone in an apt building that has supportive services on-site.  She states that there are ILS services on-site however she is choosing to shy away from these ILS services as she states they have not been very helpful.  Pt states that she is on [address] and pays only $200/month for rent.  Receives social Security disability for income.  Pt states she is on the CADI waiver and her  is Hollie De La Rosa, [phone].  She is OK with writer contacting Hollie for care coordination.  Pt reports she has an ILS worker through her CADI waiver (that is different from the ILS workers on-site at her apt building) that she sees for a whole day on Wednesday and a half day on Friday's.    Pt sees a psychiatrist named Dr. adri De La Rosa through [facility].  She admits to not seeing Dr. De La Rosa for several months due to being hospitalized for mental health for several months and then Dr. De La Rosa was apparently on maternity leave.  States she has an appointment with Dr. De La Rosa on Wed 2/20/19 at 8:00am.    Pt reports that she has an in-home psychotherapist through [facility] that sees her on Friday's.  She states she also sees a Behavioral  "Analyst which primarily focuses on her behaviors and impulse control.    Pt states that she has a mental health  through [facility]  She states that her previous , Becca Edinson, is in the process of transitioning her case to a new .  Pt states this process of transitioning has been very difficult for her and therefore, does not want writer to contact Becca nor the new .  (Of note, in order to communicate with mental health  at [employer], a release of information is needed.  Pt informed this and declined to sign one today).    Pt states she is currently struggling with coping due to being informed that her grandmother only has a few days to live.  Grandmother is dying from melanoma. She states her grandmother is currently at [facility] [place] but has told family that she does not want visitors so pt is respecting her wishes and not visiting her.    Pt states that her mother and sister are involved in her life.  She reports that she talks with her sister more frequently and has confided in her sister about some of her mental health struggles, but reports that her sister is deaf and communicates via sign language so states their communication can be difficult because pt used to sign fluently but she has since forgotten some of the language.  They communicate via Facetime and text.  Her sister lives in [place], [place].    Pt states her mother, Clarita, lives locally but she talks to her mother less frequently, does not share information about her mental health and there can sometimes be friction during their conversation \"depending on what we're talking about\".      Pt anticipates needing a taxi ride home.  She usually uses [employer] or [employer], but both of these ride options are not available late at night.    Assessment:  Significant mental health history.  Pt would benefit from continued care coordination with community supports.    Plan:    " Anticipated Disposition:  to be determined by ED MD    Barriers to d/c plan:  Medical clearance.    Follow Up:  ED SW will remain on-call until midnight.    DENIS Warner  Social Work Services  Emergency Department   [phone] phone  [phone] pager  On-call pager, [phone], 1600 to midnight

## 2019-02-19 NOTE — PLAN OF CARE
Outpatient/Observation goals to be met before discharge home:     - Rectal foreign body removed: No  - Adequate pain control on oral analgesia: No    Pt tolerated soap carolina enema, pt held for about 10 minutes until it became uncomfortable and then voided.

## 2019-02-19 NOTE — CONSULTS
"Colorectal Surgery Consult    I was asked by the ED to evaluate for a rectal foreign body.    HPI: 28 year old female with a past medical history of psychiatric disease and multiple foreign bodies swallowed and inserted rectally that was masturbating early this afternoon by placing a nail polish bottle in her rectum which she was unable to get out afterwards. She now complains of pain in her abdomen that is radiating to her back and is worst around the rectum. She has been voiding and trying to pass stool but has been unable to do so and has been unable to pass the foreign body. Denies fevers, chills, nausea, vomiting, shortness of breath, or chest pain.     ROS: 10 point ROS was conducted and negative except as mentioned in the HPI.  PMHx: Swallowed foreign body, rectal foreign body, morbid obesity, migraine, depression, borderline personality disorder, anxiety  PSHx: Ex lap in 2017 by Dr Haynes, exam under anesthesia  SH: Never  Smoker, no etoh  FH: No FH of bleeding or anesthesia rxns  Allergies: Lamotrigine, cephalosporins, oseltamivir, hydrocodone, penicillins (anaphylaxis)    Physical Exam    /79   Temp 98.9  F (37.2  C) (Oral)   Resp 18   Ht 1.575 m (5' 2\")   Wt 117.9 kg (260 lb)   SpO2 95%   BMI 47.55 kg/m      Gen: Well appearing in NAD  Eyes: No scleral icterus  Pulm: NLB on RA  CVS: RRR  Abd: Soft, non-tender, non-distended, no r/g  Rectal: On LACY there is appropriate anal tone. There is the end of a hard foreign body palpated at the tips of my fingers. I am not able to remove the foreign body.  Ext: Wwp, no edema  Psych: Cooperative  Neuro: CN II-XII intact    XR Pelvis shows foreign body in the pelvis    Lab Results   Component Value Date    WBC 15.3 (H) 02/18/2019    HGB 12.8 02/18/2019    HCT 39.5 02/18/2019     02/18/2019     02/18/2019    POTASSIUM 3.7 02/18/2019    CHLORIDE 109 02/18/2019    CO2 25 02/18/2019    BUN 11 02/18/2019    CR 0.52 02/18/2019     (H) " 02/18/2019    SED 26 (H) 07/11/2017    DD 2.2 (H) 08/18/2017    NTBNPI 22 03/28/2014    TROPI <0.015 08/18/2017    AST 13 08/04/2018    ALT 26 08/04/2018    ALKPHOS 77 08/04/2018    BILITOTAL 0.2 08/04/2018    INR 1.13 08/04/2018     Assessment: 28 year old female with a past medical history of psychiatric disease with a rectal foreign body (nail polish container). Could be felt by LACY but not removed, will need sedation.    Plan:  - Will attempt bedside removal with ketamine sedation and a large leong catheter    Discussed w/ fellow, Dr Margie Wyatt    --  Van Cheatham MD  General Surgery PGY2  [phone]      Colon and Rectal Surgery Fellow Addendum  Patient seen and examined and agree with above stated history and exam. Patient with significant psychiatric history and previous episodes of swallowing foreign bodies and inserting foreign bodies in her rectum, requiring multiple endoscopies, exams under anesthesia and laparotomies to remove these items, who presents with a nail polish bottle in her rectum. The patient consented to conscious sedation in the ER and foreign body removal was attempted at bedside without success.Will plan for repeat pelvic Xray, admission to observation, serial abdominal exams and enemas to help her pass the nailpolish bottle spontaneously.If she is unable to pass the foreign body, will plan for endoscopic removal vs. EUA and possible laparotomy tomorrow.     Plan discussed with Dr.Melton Garcia who is in agreement.    Margie Wyatt MD  Colon and Rectal Surgery Fellow  Pager: [phone]  2/18/2019 at 11:19 PM     Staff Addendum:  Agree with the consultation H&P as documented by the housestaff. I was personally involved with the recommendations made by our service for this patient. Unfortunately, this patient is well known to me. I have previously had to perform multiple procedures including a laparotomy and examination under anesthesias on multiple occasions in the past 2+ years.  She inserted a nail polish bottle and it should be able to move closer to the anal verge with conservative management. We will try to avoid examination under anesthesia or laparotomy.    Annmarie Haynes MD  Colon and Rectal Surgery Staff  [facility]

## 2019-02-19 NOTE — PROCEDURES
Procedure Note:    Pre-procedure Dx: rectal foreign body  Post-procedure Dx: same  Proc: Exam under anesthesia  Anes: Conscious sedation with ketamine, versed    Procedure Note: Prior to the start of the procedure, informed consent was obtained for both an exam under anesthesia and conscious sedation. Once the patient was adequately anesthestized, rectal exam was performed. The foreign body was unable to be palpated. A leong catheter was passed as far as it could go, the balloon inflated and attempt to retrieve the nail polish bottle attempted without success. A fallon bivalve anoscope was then inserted into the anus and the foreign body was not visualized. The procedure was then stopped and the patient awoke from sedation without complication.     Complications: none  EBL: none    Dispo: Admit to observation, attempt at conservative management, plan for endoscopy or OR tomorrow for removal    Margie Wyatt MD   [facility]   [phone].

## 2019-02-19 NOTE — ED PROVIDER NOTES
"  History     Chief Complaint   Patient presents with     Foreign Body in Rectum     HPI  Nevin Alvarado is a 28 year old female who presents emergency room with a rectal foreign body.  Patient states that earlier today she inserted a nail polish bottle into her rectum.  She states that this is the third time she has been in the emergency room in the past 2 days for a foreign body.  Yesterday she was seen at 2 outside emergency room's for swallowing probably pins which required endoscopy and also removal of an eyedropper in her rectum.  Those were all successfully done without complications.  Today she states that she has increase stress due to her grandmother dying which is causing her to continue with these activities.  Patient has a long history of swallowing foreign bodies and inserting foreign bodies into her rectum.  She denies all other complaints at this time.    I have reviewed the Medications, Allergies, Past Medical and Surgical History, and Social History in the Epic system.    Review of Systems   All other systems reviewed and are negative.      Physical Exam   BP: 155/79  Pulse: 94  Heart Rate: 112  Temp: 98.9  F (37.2  C)  Resp: 18  Height: 157.5 cm (5' 2\")  Weight: 117.9 kg (260 lb)  SpO2: 95 %      Physical Exam   Constitutional: She is oriented to person, place, and time. She appears well-developed and well-nourished. No distress.   HENT:   Head: Normocephalic and atraumatic.   Eyes: No scleral icterus.   Neck: Normal range of motion. Neck supple.   Cardiovascular: Normal rate.   Pulmonary/Chest: Effort normal.   Genitourinary:   Genitourinary Comments: Unable to palpate a foreign body   Neurological: She is alert and oriented to person, place, and time.   Skin: Skin is warm and dry. No rash noted. She is not diaphoretic. No erythema. No pallor.   Psychiatric: She has a normal mood and affect. Her behavior is normal.       ED Course        Procedures       [facility] Procedure Note  "       Sedation:      Performed by: Jeffy Velasquez  Authorized by: Jeffy Velasquez    Pre-Procedure Assessment done at     Expected Level:  Minimal Sedation    Indication:  Sedation is required to allow for Foreign body removal    Consent obtained from patient after discussing the risks, benefits and alternatives.    PO Intake:  Appropriately NPO for procedure    ASA Class:  Class 1 - HEALTHY PATIENT    Mallampati:  Grade 1:  Soft palate, uvula, tonsillar pillars, and posterior pharyngeal wall visible    Lungs: Lungs Clear with good breath sounds bilaterally.     Heart: Normal heart sounds and rate    History and physical reviewed and no updates needed. I have reviewed the lab findings, diagnostic data, medications, and the plan for sedation. I have determined this patient to be an appropriate candidate for the planned sedation and procedure and have reassessed the patient IMMEDIATELY PRIOR to sedation and procedure.      Sedation Post Procedure Summary:    Prior to the start of the procedure and with procedural staff participation, I verbally confirmed the patient s identity using two indicators, relevant allergies, that the procedure was appropriate and matched the consent or emergent situation, and that the correct equipment/implants were available. Immediately prior to starting the procedure I conducted the Time Out with the procedural staff and re-confirmed the patient s name, procedure, and site/side. (The Joint Commission universal protocol was followed.)  Yes      Sedatives: Midazolam (Versed) and Ketamine    Vital signs, airway, End Tidal CO2 and pulse oximetry were monitored and remained stable throughout the procedure and sedation was maintained until the procedure was complete.  The patient was monitored by staff until sedation discharge criteria were met.    Patient tolerance: Patient tolerated the procedure well with no immediate complications.    Time of sedation in minutes:  20 minutes from  beginning to end of physician one to one monitoring.         Critical Care time:  was 30 minutes for this patient excluding procedures.             Labs Ordered and Resulted from Time of ED Arrival Up to the Time of Departure from the ED   CBC WITH PLATELETS DIFFERENTIAL - Abnormal; Notable for the following components:       Result Value    WBC 15.3 (*)     Absolute Neutrophil 12.7 (*)     All other components within normal limits   BASIC METABOLIC PANEL - Abnormal; Notable for the following components:    Glucose 178 (*)     All other components within normal limits   IP ASSIGN PROVIDER TEAM TO TREATMENT TEAM   VITAL SIGNS   INTAKE AND OUTPUT   PERIPHERAL IV CATHETER   ACTIVITY     Medications   sodium chloride 0.9% infusion ( Intravenous New Bag 2/18/19 2230)   cloNIDine (CATAPRES) half-tab 0.05 mg (0.05 mg Oral Given 2/19/19 0042)   desvenlafaxine (PRISTIQ) 24 hr tablet 100 mg (not administered)   gabapentin (NEURONTIN) capsule 300 mg (not administered)   lurasidone (LATUDA) tablet 40 mg (not administered)   acetaminophen (TYLENOL) tablet 650 mg (not administered)   naloxone (NARCAN) injection 0.1-0.4 mg (not administered)   lactated ringers infusion ( Intravenous New Bag 2/19/19 0027)   prochlorperazine (COMPAZINE) injection 10 mg (not administered)     Or   prochlorperazine (COMPAZINE) tablet 10 mg (not administered)     Or   prochlorperazine (COMPAZINE) Suppository 25 mg (not administered)   HYDROmorphone (DILAUDID) injection 0.2 mg (not administered)   ibuprofen (ADVIL/MOTRIN) tablet 600 mg (not administered)   ketorolac (TORADOL) injection 30 mg (30 mg Intravenous Given 2/18/19 2031)   HYDROmorphone (PF) (DILAUDID) injection 0.5 mg (0.5 mg Intravenous Given 2/18/19 2230)   ketamine (KETALAR) injection 100 mg (75 mg Intravenous Given 2/18/19 2253)   midazolam (VERSED) injection 3 mg (0.5 mg Intravenous Given 2/18/19 2250)   HYDROmorphone (PF) (DILAUDID) injection 1 mg (1 mg Intravenous Given 2/18/19 2320)    metoclopramide (REGLAN) injection 5 mg (5 mg Intravenous Given 2/18/19 2330)   polyethylene glycol (MIRALAX/GLYCOLAX) Packet 34 g (34 g Oral Given 2/18/19 2338)   diphenhydrAMINE (BENADRYL) capsule 25 mg (25 mg Oral Given 2/19/19 0027)            Assessments & Plan (with Medical Decision Making)   This is a 28-year-old female patient with a significant past medical history of psychiatric issues who is presenting with a rectal foreign body.  Here in the emergency room I am unable to palpate the foreign body and x-ray confirms that there is a foreign body.  Surgery was consulted and evaluated the patient.  The colorectal fellow came to the emergency room and under procedural sedation that we attempted to retrieve the foreign body but were unsuccessful.  At this time the patient will be admitted to their service for observation with a plan to take her to the operating room tomorrow if they are unable to retrieve the foreign body in the morning.    I have reviewed the nursing notes.    I have reviewed the findings, diagnosis, plan and need for follow up with the patient.       Medication List      There are no discharge medications for this visit.         Final diagnoses:   Rectal foreign body, initial encounter       2/18/2019   [facility], [place], EMERGENCY DEPARTMENT     Jeffy Velasquez MD  02/19/19 0129

## 2019-02-19 NOTE — ED NOTES
[facility], [place]   ED Nurse to Floor Handoff     Nevin Alvarado is a 28 year old female who speaks English and lives alone,  in a home  They arrived in the ED by car from home.    Plan is to give Miralax, with hopeful improvement in location of FB, digital rectal exam in AM, then probable OR afterwards. Npo at midnight except for sips of water with meds.    ED Chief Complaint: Foreign Body in Rectum    ED Dx;   Final diagnoses:   Rectal foreign body, initial encounter         Needed?: No    Allergies:   Allergies   Allergen Reactions     Penicillins Anaphylaxis     Blood-Group Specific Substance Other (See Comments)     Patient has an anti-Cw and non-specific antibodies. Blood product orders may be delayed. Draw one red top and two purple top tubes for all type/screen/crossmatch orders.     Hydrocodone Nausea and Vomiting     vomiting for days     Influenza Vaccines Other (See Comments)     Oseltamivir Hives     med stopped, PN: med stopped     Vicodin [Hydrocodone-Acetaminophen] Nausea and Vomiting     Cephalosporins Rash     Lamotrigine Rash     Possibly associated with Lamictal.      Latex Rash   .  Past Medical Hx:   Past Medical History:   Diagnosis Date     ADD (attention deficit disorder)      Anorexia nervosa with bulimia     history of; on Topamax     Anxiety      Borderline personality disorder (H)      Depression      Depressive disorder      H/O self-harm      Lives in independent group home     due to debilitating mental illness     Migraine without aura     no known triggers; on Topamax bid and Imitrex PRN     Morbid obesity (H)      PTSD (post-traumatic stress disorder)      Rectal foreign body - Recurrent issue, self placed      Swallowed foreign body - Recurrent issue, self placed       Baseline Mental status: WDL  Current Mental Status changes: at basesline    Infection present or suspected this encounter: no  Sepsis suspected: No  Isolation type:  No active isolations     Activity level - Baseline/Home:  Independent  Activity Level - Current:   Independent    Bariatric equipment needed?: No    In the ED these meds were given:   Medications   sodium chloride 0.9% infusion ( Intravenous New Bag 2/18/19 2230)   cloNIDine (CATAPRES) half-tab 0.05 mg (0.05 mg Oral Given 2/19/19 0042)   desvenlafaxine (PRISTIQ) 24 hr tablet 100 mg (not administered)   gabapentin (NEURONTIN) capsule 300 mg (not administered)   lurasidone (LATUDA) tablet 40 mg (not administered)   acetaminophen (TYLENOL) tablet 650 mg (not administered)   naloxone (NARCAN) injection 0.1-0.4 mg (not administered)   lactated ringers infusion ( Intravenous New Bag 2/19/19 0027)   prochlorperazine (COMPAZINE) injection 10 mg (not administered)     Or   prochlorperazine (COMPAZINE) tablet 10 mg (not administered)     Or   prochlorperazine (COMPAZINE) Suppository 25 mg (not administered)   HYDROmorphone (DILAUDID) injection 0.2 mg (not administered)   ibuprofen (ADVIL/MOTRIN) tablet 600 mg (not administered)   ketorolac (TORADOL) injection 30 mg (30 mg Intravenous Given 2/18/19 2031)   HYDROmorphone (PF) (DILAUDID) injection 0.5 mg (0.5 mg Intravenous Given 2/18/19 2230)   ketamine (KETALAR) injection 100 mg (75 mg Intravenous Given 2/18/19 2253)   midazolam (VERSED) injection 3 mg (0.5 mg Intravenous Given 2/18/19 2250)   HYDROmorphone (PF) (DILAUDID) injection 1 mg (1 mg Intravenous Given 2/18/19 2320)   metoclopramide (REGLAN) injection 5 mg (5 mg Intravenous Given 2/18/19 2330)   polyethylene glycol (MIRALAX/GLYCOLAX) Packet 34 g (34 g Oral Given 2/18/19 2338)   diphenhydrAMINE (BENADRYL) capsule 25 mg (25 mg Oral Given 2/19/19 0027)       Drips running?  No    Home pump  No    Current LDAs       Labs results:   Labs Ordered and Resulted from Time of ED Arrival Up to the Time of Departure from the ED   CBC WITH PLATELETS DIFFERENTIAL - Abnormal; Notable for the following components:       Result Value  "   WBC 15.3 (*)     Absolute Neutrophil 12.7 (*)     All other components within normal limits   BASIC METABOLIC PANEL - Abnormal; Notable for the following components:    Glucose 178 (*)     All other components within normal limits   IP ASSIGN PROVIDER TEAM TO TREATMENT TEAM   VITAL SIGNS   INTAKE AND OUTPUT   PERIPHERAL IV CATHETER   ACTIVITY       Imaging Studies:   Recent Results (from the past 24 hour(s))   XR Pelvis 1/2 Views    Narrative    EXAM: XR PELVIS 1/2 VW  2/18/2019 7:04 PM      HISTORY: rectal fb    COMPARISON: Radiograph 8/13/2018    FINDINGS: Single AP view of the pelvis. Presumed rectal foreign body  projects over the pelvic inlet. IUD present in the pelvis.  Nonobstructive bowel gas pattern. No acute bony abnormalities. Partial  sacralization of the right L5 vertebral body.      Impression    IMPRESSION: Presumed rectal foreign body projects over the pelvic  inlet.         I have personally reviewed the examination and initial interpretation  and I agree with the findings.    TAMIKO VAZQUEZ MD   XR Pelvis 1/2 Views    Narrative    PRELIMINARY REPORT - The following report is a preliminary  interpretation.      Impression    Impression: Redemonstration of a foreign body projecting over the mid  pelvis, compared to prior , the foreign body has rotated and now sits  slightly higher in the pelvis       Recent vital signs:   BP (!) 125/99   Pulse 99   Temp 98.9  F (37.2  C) (Oral)   Resp 24   Ht 1.575 m (5' 2\")   Wt 117.9 kg (260 lb)   SpO2 98%   BMI 47.55 kg/m      Cardiac Rhythm: Normal Sinus  Pt needs tele? No  Skin/wound Issues: None    Code Status: Full Code    Pain control: fair    Nausea control: fair    Abnormal labs/tests/findings requiring intervention: foreign body in rectum    Family present during ED course? No   Family Comments/Social Situation comments: n/a    Tasks needing completion: None    Norma Gamboa RN        "

## 2019-02-19 NOTE — PLAN OF CARE
Observation goals PRIOR TO DISCHARGE    Comments: List all goals to be met before discharge home :  - Rectal foreign body removed :NO  - Adequate pain control on oral analgesia :NO  - Safe disposition plan has been identified:NO

## 2019-02-20 ENCOUNTER — ANESTHESIA (OUTPATIENT)
Dept: SURGERY | Facility: CLINIC | Age: 29
DRG: 394 | End: 2019-02-20
Payer: MEDICARE

## 2019-02-20 ENCOUNTER — APPOINTMENT (OUTPATIENT)
Dept: GENERAL RADIOLOGY | Facility: CLINIC | Age: 29
DRG: 394 | End: 2019-02-20
Payer: MEDICARE

## 2019-02-20 ENCOUNTER — ANESTHESIA EVENT (OUTPATIENT)
Dept: SURGERY | Facility: CLINIC | Age: 29
DRG: 394 | End: 2019-02-20
Payer: MEDICARE

## 2019-02-20 PROBLEM — T18.5XXA RECTAL FOREIGN BODY, INITIAL ENCOUNTER: Status: ACTIVE | Noted: 2019-02-20

## 2019-02-20 LAB — HCG UR QL: NEGATIVE

## 2019-02-20 PROCEDURE — 27211024 ZZHC OR SUPPLY OTHER OPNP: Performed by: COLON & RECTAL SURGERY

## 2019-02-20 PROCEDURE — 36000053 ZZH SURGERY LEVEL 2 EA 15 ADDTL MIN - UMMC: Performed by: COLON & RECTAL SURGERY

## 2019-02-20 PROCEDURE — 25800030 ZZH RX IP 258 OP 636: Performed by: NURSE ANESTHETIST, CERTIFIED REGISTERED

## 2019-02-20 PROCEDURE — A9270 NON-COVERED ITEM OR SERVICE: HCPCS | Mod: GY | Performed by: STUDENT IN AN ORGANIZED HEALTH CARE EDUCATION/TRAINING PROGRAM

## 2019-02-20 PROCEDURE — 37000008 ZZH ANESTHESIA TECHNICAL FEE, 1ST 30 MIN: Performed by: COLON & RECTAL SURGERY

## 2019-02-20 PROCEDURE — 25000125 ZZHC RX 250: Performed by: NURSE ANESTHETIST, CERTIFIED REGISTERED

## 2019-02-20 PROCEDURE — 0DJ08ZZ INSPECTION OF UPPER INTESTINAL TRACT, VIA NATURAL OR ARTIFICIAL OPENING ENDOSCOPIC: ICD-10-PCS | Performed by: COLON & RECTAL SURGERY

## 2019-02-20 PROCEDURE — 71000016 ZZH RECOVERY PHASE 1 LEVEL 3 FIRST HR: Performed by: COLON & RECTAL SURGERY

## 2019-02-20 PROCEDURE — A9270 NON-COVERED ITEM OR SERVICE: HCPCS | Mod: GY | Performed by: COLON & RECTAL SURGERY

## 2019-02-20 PROCEDURE — 27210794 ZZH OR GENERAL SUPPLY STERILE: Performed by: COLON & RECTAL SURGERY

## 2019-02-20 PROCEDURE — 0DJD8ZZ INSPECTION OF LOWER INTESTINAL TRACT, VIA NATURAL OR ARTIFICIAL OPENING ENDOSCOPIC: ICD-10-PCS | Performed by: COLON & RECTAL SURGERY

## 2019-02-20 PROCEDURE — 72170 X-RAY EXAM OF PELVIS: CPT

## 2019-02-20 PROCEDURE — 36000051 ZZH SURGERY LEVEL 2 1ST 30 MIN - UMMC: Performed by: COLON & RECTAL SURGERY

## 2019-02-20 PROCEDURE — G0378 HOSPITAL OBSERVATION PER HR: HCPCS

## 2019-02-20 PROCEDURE — 37000009 ZZH ANESTHESIA TECHNICAL FEE, EACH ADDTL 15 MIN: Performed by: COLON & RECTAL SURGERY

## 2019-02-20 PROCEDURE — 81025 URINE PREGNANCY TEST: CPT | Performed by: ANESTHESIOLOGY

## 2019-02-20 PROCEDURE — 12000001 ZZH R&B MED SURG/OB UMMC

## 2019-02-20 PROCEDURE — 25000128 H RX IP 250 OP 636: Performed by: ANESTHESIOLOGY

## 2019-02-20 PROCEDURE — 25800030 ZZH RX IP 258 OP 636: Performed by: COLON & RECTAL SURGERY

## 2019-02-20 PROCEDURE — 25000128 H RX IP 250 OP 636: Performed by: NURSE ANESTHETIST, CERTIFIED REGISTERED

## 2019-02-20 PROCEDURE — 25000132 ZZH RX MED GY IP 250 OP 250 PS 637: Mod: GY | Performed by: COLON & RECTAL SURGERY

## 2019-02-20 PROCEDURE — A9270 NON-COVERED ITEM OR SERVICE: HCPCS | Mod: GY | Performed by: ANESTHESIOLOGY

## 2019-02-20 PROCEDURE — 25000566 ZZH SEVOFLURANE, EA 15 MIN: Performed by: COLON & RECTAL SURGERY

## 2019-02-20 PROCEDURE — 25000128 H RX IP 250 OP 636: Performed by: COLON & RECTAL SURGERY

## 2019-02-20 PROCEDURE — 40000171 ZZH STATISTIC PRE-PROCEDURE ASSESSMENT III: Performed by: COLON & RECTAL SURGERY

## 2019-02-20 PROCEDURE — 25000132 ZZH RX MED GY IP 250 OP 250 PS 637: Mod: GY | Performed by: STUDENT IN AN ORGANIZED HEALTH CARE EDUCATION/TRAINING PROGRAM

## 2019-02-20 PROCEDURE — 25000132 ZZH RX MED GY IP 250 OP 250 PS 637: Mod: GY | Performed by: ANESTHESIOLOGY

## 2019-02-20 PROCEDURE — 74018 RADEX ABDOMEN 1 VIEW: CPT

## 2019-02-20 PROCEDURE — 96376 TX/PRO/DX INJ SAME DRUG ADON: CPT

## 2019-02-20 RX ORDER — OXYCODONE HCL 5 MG/5 ML
5 SOLUTION, ORAL ORAL EVERY 4 HOURS PRN
Status: DISCONTINUED | OUTPATIENT
Start: 2019-02-20 | End: 2019-02-21 | Stop reason: HOSPADM

## 2019-02-20 RX ORDER — SODIUM CHLORIDE, SODIUM LACTATE, POTASSIUM CHLORIDE, CALCIUM CHLORIDE 600; 310; 30; 20 MG/100ML; MG/100ML; MG/100ML; MG/100ML
INJECTION, SOLUTION INTRAVENOUS CONTINUOUS PRN
Status: DISCONTINUED | OUTPATIENT
Start: 2019-02-20 | End: 2019-02-20

## 2019-02-20 RX ORDER — SODIUM CHLORIDE, SODIUM LACTATE, POTASSIUM CHLORIDE, CALCIUM CHLORIDE 600; 310; 30; 20 MG/100ML; MG/100ML; MG/100ML; MG/100ML
INJECTION, SOLUTION INTRAVENOUS CONTINUOUS
Status: DISCONTINUED | OUTPATIENT
Start: 2019-02-20 | End: 2019-02-20 | Stop reason: HOSPADM

## 2019-02-20 RX ORDER — FENTANYL CITRATE 50 UG/ML
25-50 INJECTION, SOLUTION INTRAMUSCULAR; INTRAVENOUS
Status: DISCONTINUED | OUTPATIENT
Start: 2019-02-20 | End: 2019-02-20 | Stop reason: HOSPADM

## 2019-02-20 RX ORDER — ALBUTEROL SULFATE 0.83 MG/ML
2.5 SOLUTION RESPIRATORY (INHALATION) EVERY 4 HOURS PRN
Status: DISCONTINUED | OUTPATIENT
Start: 2019-02-20 | End: 2019-02-20 | Stop reason: HOSPADM

## 2019-02-20 RX ORDER — LABETALOL HYDROCHLORIDE 5 MG/ML
10 INJECTION, SOLUTION INTRAVENOUS
Status: DISCONTINUED | OUTPATIENT
Start: 2019-02-20 | End: 2019-02-20 | Stop reason: HOSPADM

## 2019-02-20 RX ORDER — DEXAMETHASONE SODIUM PHOSPHATE 4 MG/ML
INJECTION, SOLUTION INTRA-ARTICULAR; INTRALESIONAL; INTRAMUSCULAR; INTRAVENOUS; SOFT TISSUE PRN
Status: DISCONTINUED | OUTPATIENT
Start: 2019-02-20 | End: 2019-02-20

## 2019-02-20 RX ORDER — LIDOCAINE HYDROCHLORIDE 20 MG/ML
INJECTION, SOLUTION INFILTRATION; PERINEURAL PRN
Status: DISCONTINUED | OUTPATIENT
Start: 2019-02-20 | End: 2019-02-20

## 2019-02-20 RX ORDER — FENTANYL CITRATE 50 UG/ML
25-50 INJECTION, SOLUTION INTRAMUSCULAR; INTRAVENOUS
Status: DISCONTINUED | OUTPATIENT
Start: 2019-02-20 | End: 2019-02-21

## 2019-02-20 RX ORDER — LORAZEPAM 0.5 MG/1
0.5 TABLET ORAL
Status: COMPLETED | OUTPATIENT
Start: 2019-02-20 | End: 2019-02-20

## 2019-02-20 RX ORDER — CIPROFLOXACIN 2 MG/ML
400 INJECTION, SOLUTION INTRAVENOUS
Status: COMPLETED | OUTPATIENT
Start: 2019-02-20 | End: 2019-02-20

## 2019-02-20 RX ORDER — CIPROFLOXACIN 2 MG/ML
400 INJECTION, SOLUTION INTRAVENOUS SEE ADMIN INSTRUCTIONS
Status: DISCONTINUED | OUTPATIENT
Start: 2019-02-20 | End: 2019-02-20 | Stop reason: HOSPADM

## 2019-02-20 RX ORDER — MEPERIDINE HYDROCHLORIDE 50 MG/ML
12.5 INJECTION INTRAMUSCULAR; INTRAVENOUS; SUBCUTANEOUS
Status: DISCONTINUED | OUTPATIENT
Start: 2019-02-20 | End: 2019-02-20 | Stop reason: HOSPADM

## 2019-02-20 RX ORDER — ONDANSETRON 2 MG/ML
4 INJECTION INTRAMUSCULAR; INTRAVENOUS EVERY 30 MIN PRN
Status: DISCONTINUED | OUTPATIENT
Start: 2019-02-20 | End: 2019-02-20 | Stop reason: HOSPADM

## 2019-02-20 RX ORDER — PROPOFOL 10 MG/ML
INJECTION, EMULSION INTRAVENOUS PRN
Status: DISCONTINUED | OUTPATIENT
Start: 2019-02-20 | End: 2019-02-20

## 2019-02-20 RX ORDER — FENTANYL CITRATE 50 UG/ML
INJECTION, SOLUTION INTRAMUSCULAR; INTRAVENOUS PRN
Status: DISCONTINUED | OUTPATIENT
Start: 2019-02-20 | End: 2019-02-20

## 2019-02-20 RX ORDER — HYDROMORPHONE HYDROCHLORIDE 1 MG/ML
.3-.5 INJECTION, SOLUTION INTRAMUSCULAR; INTRAVENOUS; SUBCUTANEOUS EVERY 10 MIN PRN
Status: DISCONTINUED | OUTPATIENT
Start: 2019-02-20 | End: 2019-02-20 | Stop reason: HOSPADM

## 2019-02-20 RX ORDER — ONDANSETRON 4 MG/1
4 TABLET, ORALLY DISINTEGRATING ORAL EVERY 30 MIN PRN
Status: DISCONTINUED | OUTPATIENT
Start: 2019-02-20 | End: 2019-02-20 | Stop reason: HOSPADM

## 2019-02-20 RX ORDER — NALOXONE HYDROCHLORIDE 0.4 MG/ML
.1-.4 INJECTION, SOLUTION INTRAMUSCULAR; INTRAVENOUS; SUBCUTANEOUS
Status: DISCONTINUED | OUTPATIENT
Start: 2019-02-20 | End: 2019-02-20 | Stop reason: HOSPADM

## 2019-02-20 RX ORDER — ONDANSETRON 2 MG/ML
INJECTION INTRAMUSCULAR; INTRAVENOUS PRN
Status: DISCONTINUED | OUTPATIENT
Start: 2019-02-20 | End: 2019-02-20

## 2019-02-20 RX ADMIN — LORAZEPAM 0.5 MG: 0.5 TABLET ORAL at 08:21

## 2019-02-20 RX ADMIN — PROPOFOL 200 MG: 10 INJECTION, EMULSION INTRAVENOUS at 11:32

## 2019-02-20 RX ADMIN — CIPROFLOXACIN 400 MG: 2 INJECTION, SOLUTION INTRAVENOUS at 10:12

## 2019-02-20 RX ADMIN — Medication 0.2 MG: at 03:45

## 2019-02-20 RX ADMIN — FENTANYL CITRATE 25 MCG: 50 INJECTION, SOLUTION INTRAMUSCULAR; INTRAVENOUS at 16:30

## 2019-02-20 RX ADMIN — CHLORASEPTIC 1 ML: 1.5 LIQUID ORAL at 22:01

## 2019-02-20 RX ADMIN — LURASIDONE HYDROCHLORIDE 60 MG: 40 TABLET, FILM COATED ORAL at 22:01

## 2019-02-20 RX ADMIN — CLONIDINE HYDROCHLORIDE 0.05 MG: 0.1 TABLET ORAL at 08:21

## 2019-02-20 RX ADMIN — Medication 0.2 MG: at 20:06

## 2019-02-20 RX ADMIN — LIDOCAINE HYDROCHLORIDE 100 MG: 20 INJECTION, SOLUTION INFILTRATION; PERINEURAL at 11:32

## 2019-02-20 RX ADMIN — ROCURONIUM BROMIDE 50 MG: 10 INJECTION INTRAVENOUS at 11:33

## 2019-02-20 RX ADMIN — ONDANSETRON 4 MG: 2 INJECTION INTRAMUSCULAR; INTRAVENOUS at 12:00

## 2019-02-20 RX ADMIN — FENTANYL CITRATE 50 MCG: 50 INJECTION, SOLUTION INTRAMUSCULAR; INTRAVENOUS at 14:21

## 2019-02-20 RX ADMIN — SUGAMMADEX 200 MG: 100 INJECTION, SOLUTION INTRAVENOUS at 15:32

## 2019-02-20 RX ADMIN — FENTANYL CITRATE 25 MCG: 50 INJECTION, SOLUTION INTRAMUSCULAR; INTRAVENOUS at 16:07

## 2019-02-20 RX ADMIN — Medication 0.3 MG: at 17:04

## 2019-02-20 RX ADMIN — PHENYLEPHRINE HYDROCHLORIDE 100 MCG: 10 INJECTION, SOLUTION INTRAMUSCULAR; INTRAVENOUS; SUBCUTANEOUS at 12:09

## 2019-02-20 RX ADMIN — FENTANYL CITRATE 50 MCG: 50 INJECTION, SOLUTION INTRAMUSCULAR; INTRAVENOUS at 13:16

## 2019-02-20 RX ADMIN — SODIUM CHLORIDE, POTASSIUM CHLORIDE, SODIUM LACTATE AND CALCIUM CHLORIDE: 600; 310; 30; 20 INJECTION, SOLUTION INTRAVENOUS at 19:17

## 2019-02-20 RX ADMIN — POLYETHYLENE GLYCOL 3350, SODIUM SULFATE ANHYDROUS, SODIUM BICARBONATE, SODIUM CHLORIDE, POTASSIUM CHLORIDE 4000 ML: 236; 22.74; 6.74; 5.86; 2.97 POWDER, FOR SOLUTION ORAL at 19:13

## 2019-02-20 RX ADMIN — OXYCODONE HYDROCHLORIDE 5 MG: 5 SOLUTION ORAL at 20:51

## 2019-02-20 RX ADMIN — FENTANYL CITRATE 50 MCG: 50 INJECTION, SOLUTION INTRAMUSCULAR; INTRAVENOUS at 14:28

## 2019-02-20 RX ADMIN — PHENYLEPHRINE HYDROCHLORIDE 100 MCG: 10 INJECTION, SOLUTION INTRAMUSCULAR; INTRAVENOUS; SUBCUTANEOUS at 12:22

## 2019-02-20 RX ADMIN — MIDAZOLAM 2 MG: 1 INJECTION INTRAMUSCULAR; INTRAVENOUS at 11:18

## 2019-02-20 RX ADMIN — ONDANSETRON 4 MG: 2 INJECTION INTRAMUSCULAR; INTRAVENOUS at 15:11

## 2019-02-20 RX ADMIN — SODIUM CHLORIDE, POTASSIUM CHLORIDE, SODIUM LACTATE AND CALCIUM CHLORIDE: 600; 310; 30; 20 INJECTION, SOLUTION INTRAVENOUS at 14:00

## 2019-02-20 RX ADMIN — FENTANYL CITRATE 25 MCG: 50 INJECTION, SOLUTION INTRAMUSCULAR; INTRAVENOUS at 16:45

## 2019-02-20 RX ADMIN — DESVENLAFAXINE 100 MG: 50 TABLET, FILM COATED, EXTENDED RELEASE ORAL at 08:21

## 2019-02-20 RX ADMIN — DEXAMETHASONE SODIUM PHOSPHATE 8 MG: 4 INJECTION, SOLUTION INTRA-ARTICULAR; INTRALESIONAL; INTRAMUSCULAR; INTRAVENOUS; SOFT TISSUE at 12:00

## 2019-02-20 RX ADMIN — GABAPENTIN 300 MG: 300 CAPSULE ORAL at 20:51

## 2019-02-20 RX ADMIN — Medication 0.2 MG: at 22:13

## 2019-02-20 RX ADMIN — FENTANYL CITRATE 25 MCG: 50 INJECTION, SOLUTION INTRAMUSCULAR; INTRAVENOUS at 16:00

## 2019-02-20 RX ADMIN — GLUCAGON HYDROCHLORIDE 0.4 MG: KIT at 14:12

## 2019-02-20 RX ADMIN — GLUCAGON HYDROCHLORIDE 0.4 MG: KIT at 13:56

## 2019-02-20 RX ADMIN — Medication 0.2 MG: at 06:50

## 2019-02-20 RX ADMIN — PHENYLEPHRINE HYDROCHLORIDE 100 MCG: 10 INJECTION, SOLUTION INTRAMUSCULAR; INTRAVENOUS; SUBCUTANEOUS at 11:45

## 2019-02-20 RX ADMIN — Medication 0.2 MG: at 18:08

## 2019-02-20 RX ADMIN — CHLORASEPTIC 1 ML: 1.5 LIQUID ORAL at 23:04

## 2019-02-20 RX ADMIN — ROCURONIUM BROMIDE 20 MG: 10 INJECTION INTRAVENOUS at 14:31

## 2019-02-20 RX ADMIN — CLONIDINE HYDROCHLORIDE 0.05 MG: 0.1 TABLET ORAL at 20:51

## 2019-02-20 RX ADMIN — FENTANYL CITRATE 50 MCG: 50 INJECTION, SOLUTION INTRAMUSCULAR; INTRAVENOUS at 11:30

## 2019-02-20 RX ADMIN — SODIUM CHLORIDE, POTASSIUM CHLORIDE, SODIUM LACTATE AND CALCIUM CHLORIDE: 600; 310; 30; 20 INJECTION, SOLUTION INTRAVENOUS at 11:01

## 2019-02-20 RX ADMIN — SODIUM CHLORIDE, POTASSIUM CHLORIDE, SODIUM LACTATE AND CALCIUM CHLORIDE: 600; 310; 30; 20 INJECTION, SOLUTION INTRAVENOUS at 04:21

## 2019-02-20 RX ADMIN — GABAPENTIN 300 MG: 300 CAPSULE ORAL at 08:21

## 2019-02-20 ASSESSMENT — ACTIVITIES OF DAILY LIVING (ADL): ADLS_ACUITY_SCORE: 14

## 2019-02-20 NOTE — OR NURSING
Per Dr. Wyatt, patient's NG tube to be pulled back 10cm and OK for patient to be transferred to unit. Patient's NG tube pulled back per Dr. Wyatt. Patient tolerated. Transferred to unit.    Clementine Peck RN on 2/20/2019 at 5:45 PM

## 2019-02-20 NOTE — PROGRESS NOTES
COLON & RECTAL SURGERY PROGRESS NOTE  02/20/2019     Subjective  NAEON. Vitals stable. Patient continues to c/o pain radiating from rectum to abdomen. Using narcotic pain meds consistently. Remains NPO for planned procedure today. Tolerated enemas X 2 yesterday and passing BMs but did not pass foreign object.       Objective  Temp:  [97.6  F (36.4  C)-98.7  F (37.1  C)] 97.9  F (36.6  C)  Pulse:  [70] 70  Heart Rate:  [64-76] 72  Resp:  [16] 16  BP: (109-136)/(70-87) 133/85  SpO2:  [95 %-97 %] 96 %    General: AAOx4, NAD, laying comfortably in bed  CV: warm, well perfused  Pulm: nonlabored breathing on room air  Abdomen: soft, non-distended, mildly tender, no rebound or guarding; well-healed midline scar  Anorectal: No blood, good tone, unable to palpate foreign object  Extremities: no edema, moving all spontaneously and without apparent deficit    I/O last 3 completed shifts:  In: 750 [I.V.:750]  Out: 1675 [Urine:1375; Stool:300]    Labs:  No new    Imaging:  Repeat AXR - pending      Assessment/Plan  28 year old F who presented with rectal foreign body (nail polish bottle) who has failed > 24 hrs conservative management now planning to go to OR for endoscopic removal vs laparotomy.    Neuro:   - Tylenol 650 mg q4h PRN, Ibuprofen 600 mg q6h PRN  - Dilaudid IV 0.2 mg q2h PRN  - PTA clonidine, pristiq, gabapentin, latuda  - Ativan 0.5 mg ONCE PRN (pre-procedure)    CV: HD stable, no acute issues    Resp: MAÍRA, PREET    GI/FEN:    - NPO (except meds) for procedure today  - LR @ 75 ml/hr   - replete lytes prn  - Phenergan PRN for nausea  - OR today to remove foreign body - consent obtained    : UOP adequate, voiding spontaneously    ID: PREET    Heme: PREET     Ppx: Lovenox, Protonix IV daily, OOB, IS  Activity: ambulate QID  Dispo: floor    Disposition Plan   Expected discharge in 1-2 days to prior living arrangement once foreign body removed, pain controlled, tolerating regular diet, follow up with therapist/pscyh  arranged.     Entered: Alfred Hernandez 02/20/2019, 7:24 AM       Patient and plan discussed with fellow Dr. Wyatt, who will discuss with attending physician Dr. Haynes.  - - - - - - - - - - - - - - - - - -  Alfred Hernandez MD  General Surgery, PGY-1  Pager [phone].

## 2019-02-20 NOTE — BRIEF OP NOTE
[facility], [place]    Brief Operative Note    Pre-operative diagnosis: Reetal foreign body  Post-operative diagnosis same  Procedure: Procedure(s):  Exam under anesthesia Colonoscopy with attempted  removal of rectal foreign body  Surgeon: Surgeon(s) and Role:     * Estrada Chávez MD - Primary     * Margie Wyatt MD - Assisting     * Alfred Hernandez MD - Resident - Assisting  Anesthesia: General   Estimated blood loss: None  Drains:  None  Specimens: * No specimens in log *  Findings:   nail polish bottle in sigmoid colon.  Complications: None.  Implants: None.      Plan: admit to floor and start Golytely prep through NG tube.

## 2019-02-20 NOTE — ANESTHESIA CARE TRANSFER NOTE
Patient: Nevin Alvarado    Procedure(s):  Exam under anesthesia Colonoscopy with attempted  removal of rectal foreign body    Diagnosis: Reetal foreign body  Diagnosis Additional Information: No value filed.    Anesthesia Type:   No value filed.     Note:  Airway :Face Mask  Patient transferred to:PACU  Comments: To PACU, VSS, airway patent, RN at bedside, patient awake and alert.Handoff Report: Identifed the Patient, Identified the Reponsible Provider, Reviewed the pertinent medical history, Discussed the surgical course, Reviewed Intra-OP anesthesia mangement and issues during anesthesia, Set expectations for post-procedure period and Allowed opportunity for questions and acknowledgement of understanding      Vitals: (Last set prior to Anesthesia Care Transfer)    CRNA VITALS  2/20/2019 1509 - 2/20/2019 1550      2/20/2019             NIBP:  120/76    Pulse:  78    Resp Rate (observed):  16                Electronically Signed By: HU Ruby CRNA  February 20, 2019  3:50 PM

## 2019-02-20 NOTE — ANESTHESIA PREPROCEDURE EVALUATION
Anesthesia Pre-Procedure Evaluation    Patient: Nevin Alvarado   MRN:     [id number] Gender:   female   Age:    28 year old :      1990        Preoperative Diagnosis: Reetal foreign body   Procedure(s):  Exam unde anesthesia Flexible sigmoidscopy possible eploratory laparotomy     Past Medical History:   Diagnosis Date     ADD (attention deficit disorder)      Anorexia nervosa with bulimia     history of; on Topamax     Anxiety      Borderline personality disorder (H)      Depression      Depressive disorder      H/O self-harm      Lives in independent group home     due to debilitating mental illness     Migraine without aura     no known triggers; on Topamax bid and Imitrex PRN     Morbid obesity (H)      PTSD (post-traumatic stress disorder)      Rectal foreign body - Recurrent issue, self placed      Swallowed foreign body - Recurrent issue, self placed       Past Surgical History:   Procedure Laterality Date     ESOPHAGOSCOPY, GASTROSCOPY, DUODENOSCOPY (EGD), COMBINED N/A 3/9/2017    Procedure: COMBINED ESOPHAGOSCOPY, GASTROSCOPY, DUODENOSCOPY (EGD), REMOVE FOREIGN BODY;  Surgeon: Avis Guzmán MD;  Location: [facility] OR     ESOPHAGOSCOPY, GASTROSCOPY, DUODENOSCOPY (EGD), COMBINED N/A 2017    Procedure: COMBINED ESOPHAGOSCOPY, GASTROSCOPY, DUODENOSCOPY (EGD), REMOVE FOREIGN BODY;  EGD removal Foregin body;  Surgeon: Lokesh Paula MD;  Location: [facility] OR     ESOPHAGOSCOPY, GASTROSCOPY, DUODENOSCOPY (EGD), COMBINED N/A 2017    Procedure: COMBINED ESOPHAGOSCOPY, GASTROSCOPY, DUODENOSCOPY (EGD);  COMBINED ESOPHAGOSCOPY, GASTROSCOPY, DUODENOSCOPY (EGD) [[id number]]attempted removal of foreign body;  Surgeon: Pamela Perez MD;  Location: [facility]     ESOPHAGOSCOPY, GASTROSCOPY, DUODENOSCOPY (EGD), COMBINED N/A 2017    Procedure: COMBINED ESOPHAGOSCOPY, GASTROSCOPY, DUODENOSCOPY (EGD), REMOVE FOREIGN BODY;  Esophagoscopy, Gastroscopy, Duodenoscopy, Removal of  Foreign Body;  Surgeon: Dejon Marsh MD;  Location: [facility]     ESOPHAGOSCOPY, GASTROSCOPY, DUODENOSCOPY (EGD), COMBINED N/A 1/6/2018    Procedure: COMBINED ESOPHAGOSCOPY, GASTROSCOPY, DUODENOSCOPY (EGD), REMOVE FOREIGN BODY;  COMBINED ESOPHAGOSCOPY, GASTROSCOPY, DUODENOSCOPY (EGD) [by pascal net and snare with profol sedation;  Surgeon: Feliciano Emmanuel MD;  Location:  GI     ESOPHAGOSCOPY, GASTROSCOPY, DUODENOSCOPY (EGD), COMBINED N/A 3/19/2018    Procedure: COMBINED ESOPHAGOSCOPY, GASTROSCOPY, DUODENOSCOPY (EGD), REMOVE FOREIGN BODY;   Esophagodscopy, Gastroscopy, Duodenoscopy,Foreign Body Removal;  Surgeon: Brice Guzmán MD;  Location: [facility]     ESOPHAGOSCOPY, GASTROSCOPY, DUODENOSCOPY (EGD), COMBINED N/A 4/16/2018    Procedure: COMBINED ESOPHAGOSCOPY, GASTROSCOPY, DUODENOSCOPY (EGD), REMOVE FOREIGN BODY;  Esophagogastroduodenoscopy  Foreign Body Removal X 2;  Surgeon: Royer Moise MD;  Location: [facility]     ESOPHAGOSCOPY, GASTROSCOPY, DUODENOSCOPY (EGD), COMBINED N/A 6/1/2018    Procedure: COMBINED ESOPHAGOSCOPY, GASTROSCOPY, DUODENOSCOPY (EGD), REMOVE FOREIGN BODY;  COMBINED ESOPHAGOSCOPY, GASTROSCOPY, DUODENOSCOPY with removal of foreign body, propofol sedation by anesthesia;  Surgeon: Brice Martinez MD;  Location:  GI     ESOPHAGOSCOPY, GASTROSCOPY, DUODENOSCOPY (EGD), COMBINED N/A 7/25/2018    Procedure: COMBINED ESOPHAGOSCOPY, GASTROSCOPY, DUODENOSCOPY (EGD), REMOVE FOREIGN BODY;;  Surgeon: Candy Castelan MD;  Location:  GI     ESOPHAGOSCOPY, GASTROSCOPY, DUODENOSCOPY (EGD), COMBINED N/A 7/28/2018    Procedure: COMBINED ESOPHAGOSCOPY, GASTROSCOPY, DUODENOSCOPY (EGD), REMOVE FOREIGN BODY;  COMBINED ESOPHAGOSCOPY, GASTROSCOPY, DUODENOSCOPY (EGD), REMOVE FOREIGN BODY;  Surgeon: Brice Guzmán MD;  Location: [facility]     ESOPHAGOSCOPY, GASTROSCOPY, DUODENOSCOPY (EGD), COMBINED N/A 7/31/2018    Procedure: COMBINED ESOPHAGOSCOPY, GASTROSCOPY, DUODENOSCOPY (EGD);   COMBINED ESOPHAGOSCOPY, GASTROSCOPY, DUODENOSCOPY (EGD) TO REMOVE FOREIGN BODY;  Surgeon: Lokesh Paula MD;  Location: [facility]     ESOPHAGOSCOPY, GASTROSCOPY, DUODENOSCOPY (EGD), COMBINED N/A 8/4/2018    Procedure: COMBINED ESOPHAGOSCOPY, GASTROSCOPY, DUODENOSCOPY (EGD), REMOVE FOREIGN BODY;   combined esophagoscopy, gastroscopy, duodenoscopy, REMOVE FOREIGN BODY ;  Surgeon: Lokesh Paula MD;  Location: [facility]     EXAM UNDER ANESTHESIA ANUS N/A 1/10/2017    Procedure: EXAM UNDER ANESTHESIA ANUS;  Surgeon: Annmarie Haynes MD;  Location: [facility]     EXAM UNDER ANESTHESIA RECTUM N/A 7/19/2018    Procedure: EXAM UNDER ANESTHESIA RECTUM;  EXAM UNDER ANESTHESIA, REMOVAL OF RECTAL FOREIGN BODY;  Surgeon: Annmarie Haynes MD;  Location: [facility]     HC REMOVE FECAL IMPACTION OR FB W ANESTHESIA N/A 12/18/2016    Procedure: REMOVE FECAL IMPACTION/FOREIGN BODY UNDER ANESTHESIA;  Surgeon: Soham Cano MD;  Location: [facility] OR     KNEE SURGERY - removed a small tissue mass from knee Right      LAPAROSCOPIC ABLATION ENDOMETRIOSIS       LAPAROTOMY EXPLORATORY N/A 1/10/2017    Procedure: LAPAROTOMY EXPLORATORY;  Surgeon: Annmarie Haynes MD;  Location: [facility] OR     lymph nodes removed from neck; benign  age 7     MAMMOPLASTY REDUCTION Bilateral      RELEASE CARPAL TUNNEL Bilateral      SIGMOIDOSCOPY FLEXIBLE N/A 1/10/2017    Procedure: SIGMOIDOSCOPY FLEXIBLE;  Surgeon: Annmarie Haynes MD;  Location: [facility]     SIGMOIDOSCOPY FLEXIBLE N/A 5/8/2018    Procedure: SIGMOIDOSCOPY FLEXIBLE;  flex sig with foreign body removal using snare and rattooth forcep;  Surgeon: Soham Cano MD;  Location: [facility] GI                   PHYSICAL EXAM:   Mental Status/Neuro: A/A/O   Airway: Facies: Feasible  Mallampati: II  Mouth/Opening: Full  TM distance: > 6 cm  Neck ROM: Full   Respiratory: Auscultation: CTAB     Resp. Rate: Normal     Resp. Effort: Normal      CV: Rhythm: Regular  Rate: Age  "appropriate  Heart: Normal Sounds   Comments:      Dental: Normal                  Lab Results   Component Value Date    WBC 15.3 (H) 02/18/2019    HGB 12.8 02/18/2019    HCT 39.5 02/18/2019     02/18/2019    CRP 15.7 (H) 10/19/2014    SED 26 (H) 07/11/2017     02/18/2019    POTASSIUM 3.7 02/18/2019    CHLORIDE 109 02/18/2019    CO2 25 02/18/2019    BUN 11 02/18/2019    CR 0.52 02/18/2019     (H) 02/18/2019    KELBY 8.6 02/18/2019    PHOS 3.7 07/31/2018    MAG 2.1 08/04/2018    ALBUMIN 3.2 (L) 08/04/2018    PROTTOTAL 6.3 (L) 08/04/2018    ALT 26 08/04/2018    AST 13 08/04/2018    ALKPHOS 77 08/04/2018    BILITOTAL 0.2 08/04/2018    LIPASE 128 07/16/2018    PTT 37 01/31/2015    INR 1.13 08/04/2018    TSH 0.66 03/21/2018    T4 0.96 05/16/2017    HCG Negative 02/20/2019    HCGS Negative 12/22/2017       Preop Vitals  BP Readings from Last 3 Encounters:   02/20/19 (!) 136/94   08/13/18 132/59   08/04/18 128/72    Pulse Readings from Last 3 Encounters:   02/20/19 70   08/04/18 70   07/31/18 82      Resp Readings from Last 3 Encounters:   02/20/19 16   08/13/18 18   08/04/18 13    SpO2 Readings from Last 3 Encounters:   02/20/19 97%   08/13/18 97%   08/04/18 96%      Temp Readings from Last 1 Encounters:   02/20/19 36.6  C (97.9  F) (Axillary)    Ht Readings from Last 1 Encounters:   02/18/19 1.575 m (5' 2\")      Wt Readings from Last 1 Encounters:   02/18/19 117.9 kg (260 lb)    Estimated body mass index is 47.55 kg/m  as calculated from the following:    Height as of this encounter: 1.575 m (5' 2\").    Weight as of this encounter: 117.9 kg (260 lb).     LDA:  Peripheral IV 02/18/19 Left Hand (Active)   Site Assessment WDL 2/20/2019  9:33 AM   Line Status Saline locked 2/20/2019  9:33 AM   Phlebitis Scale 0-->no symptoms 2/20/2019  9:33 AM   Infiltration Scale 0 2/20/2019  9:33 AM   Infiltration Site Treatment Method  None 2/20/2019  8:25 AM   Extravasation? No 2/20/2019  8:25 AM   Number of days: 2    "         Assessment:   ASA SCORE: 3    NPO Status: > 6 hours since completed Solid Foods   Documentation: H&P complete; Preop Testing complete   Proceeding: Proceed without further delay  Tobacco Use:  NO Active use of Tobacco/UNKNOWN Tobacco use status     Plan:   Anes. Type:  General   Pre-Induction: Midazolam IV; Acetaminophen PO   Induction:  IV (Standard)   Airway: Oral ETT   Access/Monitoring: PIV   Maintenance: Balanced   Emergence: Procedure Site   Logistics: Same Day Surgery     Postop Pain/Sedation Strategy:  Standard-Options: Opioids PRN     PONV Management:  Adult Risk Factors: Female, Non-Smoker, Postop Opioids  Prevention: Ondansetron; Dexamethasone     CONSENT: Direct conversation   Plan and risks discussed with: Patient          Comments for Plan/Consent:  ______________________________________________________________________  I discussed the risks and benefits of general anesthesia with the patient.  Questions were sought and answered.      Foster Mckeon MD  Attending Anesthesiologist                           Foster Mckeon MD

## 2019-02-20 NOTE — ANESTHESIA POSTPROCEDURE EVALUATION
Anesthesia POST Procedure Evaluation    Patient: Nevin Alvarado   MRN:     [id number] Gender:   female   Age:    28 year old :      1990        Preoperative Diagnosis: Reetal foreign body   Procedure(s):  Exam under anesthesia Colonoscopy with attempted  removal of rectal foreign body   Postop Comments: No value filed.       Anesthesia Type:  General    Reportable Event: NO     PAIN: Uncomplicated   Sign Out status: Comfortable, Well controlled pain     PONV: No PONV   Sign Out status:  No Nausea or Vomiting     Neuro/Psych: Uneventful perioperative course   Sign Out Status: Preoperative baseline; Age appropriate mentation     Airway/Resp.: Uneventful perioperative course   Sign Out Status: Non labored breathing, age appropriate RR; Resp. Status within EXPECTED Parameters     CV: Uneventful perioperative course   Sign Out status: Appropriate BP and perfusion indices; Appropriate HR/Rhythm     Disposition:   Sign Out in:  PACU  Disposition:  Phase II; Home  Recovery Course: Uneventful  Follow-Up: Not required           Last Anesthesia Record Vitals:  CRNA VITALS  2019 1509 - 2019 1609      2019             NIBP:  120/76    Pulse:  78    Resp Rate (observed):  16          Last PACU/Preop Vitals:  Vitals:    19 1600 19 1615 19 1630   BP: 146/83 147/79 (!) 136/92   Pulse: 92  88   Resp: 20 12 17   Temp: 36.8  C (98.3  F)  36.8  C (98.3  F)   SpO2: 91% 96% 96%         Electronically Signed By: Efraín Doll MD, 2019, 5:06 PM

## 2019-02-21 ENCOUNTER — DOCUMENTATION ONLY (OUTPATIENT)
Dept: OTHER | Facility: CLINIC | Age: 29
End: 2019-02-21

## 2019-02-21 VITALS
BODY MASS INDEX: 46.74 KG/M2 | RESPIRATION RATE: 18 BRPM | HEIGHT: 62 IN | OXYGEN SATURATION: 95 % | DIASTOLIC BLOOD PRESSURE: 78 MMHG | SYSTOLIC BLOOD PRESSURE: 122 MMHG | HEART RATE: 72 BPM | WEIGHT: 254 LBS | TEMPERATURE: 98.2 F

## 2019-02-21 PROCEDURE — 25800030 ZZH RX IP 258 OP 636: Performed by: COLON & RECTAL SURGERY

## 2019-02-21 PROCEDURE — 25000128 H RX IP 250 OP 636: Performed by: COLON & RECTAL SURGERY

## 2019-02-21 PROCEDURE — A9270 NON-COVERED ITEM OR SERVICE: HCPCS | Mod: GY | Performed by: COLON & RECTAL SURGERY

## 2019-02-21 PROCEDURE — 25000125 ZZHC RX 250: Performed by: STUDENT IN AN ORGANIZED HEALTH CARE EDUCATION/TRAINING PROGRAM

## 2019-02-21 PROCEDURE — 25000132 ZZH RX MED GY IP 250 OP 250 PS 637: Mod: GY | Performed by: COLON & RECTAL SURGERY

## 2019-02-21 PROCEDURE — A9270 NON-COVERED ITEM OR SERVICE: HCPCS | Mod: GY | Performed by: ANESTHESIOLOGY

## 2019-02-21 PROCEDURE — 25000132 ZZH RX MED GY IP 250 OP 250 PS 637: Mod: GY | Performed by: ANESTHESIOLOGY

## 2019-02-21 RX ADMIN — Medication 0.2 MG: at 02:13

## 2019-02-21 RX ADMIN — OXYCODONE HYDROCHLORIDE 5 MG: 5 SOLUTION ORAL at 05:14

## 2019-02-21 RX ADMIN — TOPICAL ANESTHETIC 0.5 ML: 200 SPRAY DENTAL; PERIODONTAL at 09:05

## 2019-02-21 RX ADMIN — SODIUM CHLORIDE, POTASSIUM CHLORIDE, SODIUM LACTATE AND CALCIUM CHLORIDE: 600; 310; 30; 20 INJECTION, SOLUTION INTRAVENOUS at 09:00

## 2019-02-21 RX ADMIN — CHLORASEPTIC 1 ML: 1.5 LIQUID ORAL at 00:15

## 2019-02-21 RX ADMIN — CHLORASEPTIC 1 ML: 1.5 LIQUID ORAL at 11:16

## 2019-02-21 RX ADMIN — OXYCODONE HYDROCHLORIDE 5 MG: 5 SOLUTION ORAL at 01:12

## 2019-02-21 RX ADMIN — Medication 0.2 MG: at 04:12

## 2019-02-21 RX ADMIN — CLONIDINE HYDROCHLORIDE 0.05 MG: 0.1 TABLET ORAL at 09:00

## 2019-02-21 RX ADMIN — GABAPENTIN 300 MG: 300 CAPSULE ORAL at 09:00

## 2019-02-21 RX ADMIN — Medication 0.2 MG: at 08:59

## 2019-02-21 RX ADMIN — CHLORASEPTIC 1 ML: 1.5 LIQUID ORAL at 04:11

## 2019-02-21 RX ADMIN — Medication 0.2 MG: at 12:30

## 2019-02-21 RX ADMIN — CHLORASEPTIC 1 ML: 1.5 LIQUID ORAL at 06:19

## 2019-02-21 RX ADMIN — DESVENLAFAXINE 100 MG: 50 TABLET, FILM COATED, EXTENDED RELEASE ORAL at 09:00

## 2019-02-21 RX ADMIN — PROMETHAZINE HYDROCHLORIDE 12.5 MG: 25 INJECTION INTRAMUSCULAR; INTRAVENOUS at 09:06

## 2019-02-21 RX ADMIN — OXYCODONE HYDROCHLORIDE 5 MG: 5 SOLUTION ORAL at 11:12

## 2019-02-21 RX ADMIN — Medication 0.2 MG: at 00:15

## 2019-02-21 RX ADMIN — Medication 0.2 MG: at 06:14

## 2019-02-21 RX ADMIN — PROMETHAZINE HYDROCHLORIDE 12.5 MG: 25 INJECTION INTRAMUSCULAR; INTRAVENOUS at 00:42

## 2019-02-21 ASSESSMENT — ACTIVITIES OF DAILY LIVING (ADL)
ADLS_ACUITY_SCORE: 15
ADLS_ACUITY_SCORE: 14

## 2019-02-21 ASSESSMENT — MIFFLIN-ST. JEOR: SCORE: 1840.39

## 2019-02-21 ASSESSMENT — PAIN DESCRIPTION - DESCRIPTORS: DESCRIPTORS: ACHING;CRAMPING

## 2019-02-21 NOTE — PROGRESS NOTES
Pt is A&O x4, VSS, NPO except for meds. Pt getting IV dilaudid q2h and oral liquid oxycodone. Pt states this is helping her abdominal pain and cramps. Bowel prep going through her NG tube at 150 ml/hr. Reported mild nausea and received IV phenergan x1, nausea has since resolved. Has been up to the bathroom x3 for bowel movements which have been watery and brown. Pt is on a 1:1, nurse will continue to monitor.

## 2019-02-21 NOTE — PLAN OF CARE
"/83 (BP Location: Right arm)   Pulse 91   Temp 98.8  F (37.1  C) (Oral)   Resp 17   Ht 1.575 m (5' 2\")   Wt 117.9 kg (260 lb)   SpO2 96%   BMI 47.55 kg/m      Pt is A&Ox4, VSS, and is NPO except meds which are being given through her NG tube. Pt getting IV dilauded q2h, and oxycodone q4h via NG tube. Bowel prep going through NG tube at 150 mL/hr. Pt denying nausea. Feels like bowels are starting to move, and is passing gas. Pt is on a 1:1, nurse will continue to monitor.  "

## 2019-02-21 NOTE — PROVIDER NOTIFICATION
"[place]/Rectal surgery resident paged RE: \"6D 514 J.F. Pt is curious if she will still be taking her evening meds orally? Thanks! Parris MORALES, [id number]\"    Okay to give meds through NG tube based on patient's comfort  "

## 2019-02-21 NOTE — PROGRESS NOTES
COLON & RECTAL SURGERY PROGRESS NOTE  02/21/2019     Subjective  NAEON. Vitals stable. She denies passing the foreign body overnight. NG tube in place and being used for go lytely prep administration, which she has tolerated. She c/o some throat discomfort due to NG tube. Rectal/abdominal pain is stable.      Objective  Temp:  [97.9  F (36.6  C)-98.8  F (37.1  C)] 98.2  F (36.8  C)  Pulse:  [72-92] 72  Heart Rate:  [73-97] 90  Resp:  [12-20] 18  BP: (122-147)/(73-95) 122/80  SpO2:  [91 %-100 %] 97 %    General: AAOx4, NAD, laying in bed  HEENT: NGT in place  CV: warm, well perfused  Pulm: nonlabored breathing on room air  Abdomen: soft, non-distended, mildly tender, no rebound or guarding; well-healed midline scar  Anorectal: No blood, good tone, unable to palpate foreign object  Extremities: no edema, moving all spontaneously and without apparent deficit    I/O last 3 completed shifts:  In: 1575 [I.V.:1575]  Out: 1450 [Urine:1450]    Labs:  No new    Imaging:  Repeat AXR - ordered       Assessment/Plan  28 year old F who presented with rectal foreign body (nail polish bottle) who has failed > 24 hrs conservative management now planning to go to OR for endoscopic removal vs laparotomy.    Neuro:   - Tylenol 650 mg q4h PRN, Ibuprofen 600 mg q6h PRN  - Dilaudid IV 0.2 mg q2h PRN, Oxycodone 5 mg q 4h PRN  - PTA clonidine, pristiq, gabapentin, latuda    CV: HD stable, no acute issues    Resp: MARÍA, PREET    GI/FEN:    - NPO (except meds)  - LR @ 75 ml/hr   - replete lytes prn  - Continue colon prep via NGT (150 ml/hr)  - Phenergan PRN for nausea  - Repeat AXR this afternoon    : UOP adequate, voiding spontaneously    ID: PREET    Heme: PREET     Ppx: SCDs, OOB  Activity: ambulate QID  Dispo: floor    Disposition Plan   Expected discharge in 1-2 days to prior living arrangement once foreign body removed, pain controlled, tolerating regular diet, follow up with therapist/pscyh arranged.     Entered: Alfred Hernandez  02/21/2019, 8:25 AM       Patient and plan discussed with fellow Dr. Wyatt, who will discuss with attending physician Dr. Haynes.  - - - - - - - - - - - - - - - - - -  Alfred Hernandez MD  General Surgery, PGY-1  Pager [phone].

## 2019-02-21 NOTE — OP NOTE
Procedure Date: 02/20/2019      PREOPERATIVE DIAGNOSIS:  Sigmoid colon foreign body.      POSTOPERATIVE DIAGNOSIS:  Sigmoid colon foreign body.      PROCEDURE:  Examination under anesthesia with colonoscopy to cecum and attempted removal of sigmoid foreign body.      A code 22 modifier should be applied due to the marked difficulty of this case that took in excess of 3 hours to complete.  This included the use of multiple transcolonoscopic retrieval devices and the use of 2 different endoscopes.      HISTORY:  This 28-year-old woman has a history of a chronic psychiatric disorder.  She has had numerous admissions and procedures for foreign bodies that she has injected and inserted rectally.  She presented to the hospital 2 days ago having lost a nail polish bottle in her rectum.  She complained of abdominal pain.  She has had 1 prior exploratory laparotomy for this problem.  She was admitted but did not pass the nail polish bottle with a bowel regimen, so she was brought to the operating room for attempted removal.  I met the patient in the preoperative holding room and discussed the full range of risks and alternatives in detail.  She was consented for attempted transrectal or endoscopic removal, as well as a possible laparotomy, but I advised her that I thought a laparotomy would be unlikely.  I feel this would be ill-advised given the relatively small size of the foreign body and the fact that she is a multiple recidivist and should probably not have multiple laparotomies for foreign bodies, unless they are absolutely necessary.  I answered all the patient's questions to her stated satisfaction.  She expressed understanding and provided written informed consent.      SURGEON:  Estrada Chávez MD      ASSISTANT:  Margie Wyatt MD      DESCRIPTION OF PROCEDURE:  After induction of adequate endotracheal anesthesia, the patient was placed in the supine position on the gurney.  A timeout was performed and the  patient and procedure confirmed.  Digital rectal examination demonstrated no palpable foreign body.  We used an adult video colonoscope and encountered the perfume body at approximately 40-50 cm on insertion and what I felt likely to be the proximal sigmoid colon.  We were able to pass the scope beyond this.  We then spent substantial time trying to get a snare around either the base or the cap of the bottle.  The bottle had tapered sides, making grasping the bottle itself quite difficult.  It was difficult to get a snare over the cap because of the orientation of the body with the cap located proximally.  The depression of the scope to properly orient the snare resulted in depression of the bottle and did not achieve a satisfactory angle for the snare to encircle the cap or bottle itself.  After a fairly prolonged attempt at this, I changed to an upper GI endoscope, feeling that the location of the biopsy channel, not at the base of the scope, might facilitate passing a snare.  This again proved to be unsuccessful despite multiple attempts.  I switched back to the colonoscope and attempted to grasp the bottle with the patient both in the left lateral and supine positions.  I tried multiple snares and graspers, and eventually was able to get a stone basket around the cap of the bottle and I was able to snug this up at the junction of the cap and the rest of the bottle.  The bottle moved quite freely both proximally and distally.  I was confident that I had no adjacent mucosa involved.  With the basket secured, I started to withdraw the scope.  The bottle proceeded distally for a distance of almost 30 cm from its most proximal extent, but I encountered an area of angulation in the mid sigmoid through which the bowel would not pass, as with the  on the bottle on its cranial end, the bottle attempted to rotate 90 degrees so that the leading edge would go first.  With this problem, I tried Glucagon, abdominal  pressure, and additional changes in position, but the bowel was too narrow for the bottle to flip and it simply would not proceed bottom first with the grasper on more proximally.  I brought a second tower into the room and I ran the upper GI scope up to the level of the foreign body in the mid sigmoid in an effort to straighten out the angulation and complete the retrieval, but this was unsuccessful.  Having tried all of the maneuvers I could think of, I decided to abandon this approach and we freed the bowel from the basket and removed the basket from the scope.  When this was completed, I passed the colonoscope to the cecum, positively identified by visualization of the ileocecal valve.  The prep was poor, so there was no clear visualization of the appendiceal orifice, but the landmarks were quite clear.  I slowly withdrew the scope and inspected the bowel for any possible injury, and none was apparent.  The scope was removed.  At this juncture, I felt laparotomy was not appropriate as the bottle fit comfortably in the bowel and was freely mobile.  I felt there was a good chance that the bottle could be brought at least to the rectum where it could be removed transanally.  I again felt that performing a laparotomy in an individual with frequent foreign body problems would only lead to frequent additional laparotomies.  I discussed this by telephone with Dr. Annmarie Haynes, who was in agreement with this approach.  We did place a nasogastric tube to facilitate administration of GoLYTELY.  The patient will be followed radiologically to see if there is any progress on the device.      The patient tolerated the procedure well and without evident complications.  There was no blood loss.         DANIEL FINNEY MD             D: 2019   T: 2019   MT: PRASANTH      Name:     ABAD TONG   MRN:      [id number]        Account:        [id number]   :      1990           Procedure Date:  02/20/2019      Document: [id number]       cc: Latonya Haynes MD       P Surgery Billing

## 2019-02-21 NOTE — PROVIDER NOTIFICATION
"Colorectal surgery paged RE: \"6D 514 J.F. Pt's Latuda should be 60 mg not 40 mg. also she's asking about the throat spray you guys talked about. Thanks! Parris MORALES, [id number]\"    Orders placed  "

## 2019-02-21 NOTE — PROGRESS NOTES
"Post Op Check    02/20/2019    Nevin Alvarado is a 28 year old female with h/o rectal foreign body now POD#0 s/p exam under anesthesia, colonoscopy with attempted removal of rectal foreign body.    Pt reports some nausea without vomiting with administration of bowel prep via NG tube but otherwise denies significant nausea or vomiting. Reports continuing \"soreness\" that dilaudid helps with but does not completely obliterate. Denies SOB, chest pain, fevers, chills. No BM.    /83 (BP Location: Right arm)   Pulse 91   Temp 98.8  F (37.1  C) (Oral)   Resp 17   Ht 1.575 m (5' 2\")   Wt 117.9 kg (260 lb)   SpO2 96%   BMI 47.55 kg/m      Gen: A&O x3, NAD. Calm and cooperative with exam.  Chest: breathing non-labored on room air. Capnography in place.  Abdomen: soft, non-tender, non-distended.  Incision: None  Extremities: warm and well perfused.    I/O last 3 completed shifts:  In: 1775 [I.V.:1775]  Out: 1875 [Urine:1875]    A/P: No acute post-op issues. Continue plan of care per primary team. Please call with any questions.    Roya Cheatham MD  General Surgery PGY-1    "

## 2019-02-21 NOTE — DISCHARGE SUMMARY
Discharge instructions reviewed, understood, and signed by patient. VSS, PIV removed, new medications reviewed and understood, patient has all belongings. Patient to leave unit via wheelchair.

## 2019-02-21 NOTE — DISCHARGE SUMMARY
[facility]  Discharge Summary  Colon and Rectal Surgery     Nevin Alvarado MRN# [id number]   YOB: 1990 Age: 28 year old     Date of Admission:  2/18/2019  Date of Discharge::  2/21/2019  Admitting Physician:  Annmarie Haynes MD  Discharge Physician:  Alfred Hernandez MD  Primary Care Physician:        Latonya Knight          Admission Diagnoses:   Rectal foreign body, initial encounter [T18.5XXA]          Discharge Diagnosis:   Rectal foreign body, initial encounter [T18.5XXA]         Procedures:   Procedure(s):  2/20/19 - Dr. Chávez  1. Exam under anesthesia   2. Colonoscopy with attempted removal of rectal foreign body          Consultations:   SOCIAL WORK IP CONSULT          Brief History of Illness:   28 year old female well known to the Colorectal Surgery service who has a past medical history of psychiatric disease and multiple foreign bodies swallowed and inserted rectally that was masturbating early this afternoon by placing a nail polish bottle in her rectum which she was unable to get out afterwards. She now complains of pain in her abdomen that is radiating to her back and is worst around the rectum. She has been voiding and trying to pass stool but has been unable to do so and has been unable to pass the foreign body. Denies fevers, chills, nausea, vomiting, shortness of breath, or chest pain.            Hospital Course:   The patient was initially consent to conscious sedation in ER and attempted foreign body removal at bedside, which was unsuccessful. She was then admitted to [facility] for additional evaluation and management. She was placed on bowel rest, her pain was controlled with medications, and she was closely observed with serial abdominal exams. Additionally, as a pre-cautionary measure a 1:1 sitter was assigned out of concern for additional ingestion/insertion of foreign objects or other self-harm. She was administered two enemas on  hospital day #1 without success in passing the foreign body. On hospital day #2 she was consented for the above stated procedure. She underwent EUA and colonoscopy on 2/20/19. The foreign body could not be removed. She tolerated this procedure well without immediate complications. She was transferred to the PACU and then floor for routine postoperative care. An nasogastric tube was placed intra-operatively and used to administer a colon prep overnight and into the morning of hospital day #3. On hospital day #3 she successfully passed the foreign body. The remainder of her hospital course was unremarkable. Her diet was advanced. On the day of discharge, she was tolerating a regular diet, her pain was well controlled, she was voiding spontaneously, and ambulating independently. Social Work was consulted to aid the patient in obtaining transportation. She will discharge to home with follow up with her therapist reportedly scheduled for 2/22/19. She may follow up in Colorectal Surgery clinic with Dr. Haynes on an as needed basis.         Imaging Studies:     Results for orders placed or performed during the hospital encounter of 02/18/19   XR Pelvis 1/2 Views    Narrative    EXAM: XR PELVIS 1/2 VW  2/18/2019 7:04 PM      HISTORY: rectal fb    COMPARISON: Radiograph 8/13/2018    FINDINGS: Single AP view of the pelvis. Presumed rectal foreign body  projects over the pelvic inlet. IUD present in the pelvis.  Nonobstructive bowel gas pattern. No acute bony abnormalities. Partial  sacralization of the right L5 vertebral body.      Impression    IMPRESSION: Presumed rectal foreign body projects over the pelvic  inlet.         I have personally reviewed the examination and initial interpretation  and I agree with the findings.    TAMIKO VAZQUEZ MD   XR Pelvis 1/2 Views    Narrative    Exam: XR PELVIS 1/2 VW, 2/18/2019 11:20 PM    Indication: rectal fb    Comparison: Same day and pelvis radiograph    Findings:  Single AP  radiograph of the pelvis. Redemonstration of a  foreign body projecting over the mid pelvis, compared to prior the  foreign body has rotated and now sits slightly higher in the pelvis.  Mild enthesopathic changes of the pelvis. Intrauterine device.      Impression    Impression: Redemonstration of a foreign body projecting over the mid  pelvis, compared to prior , the foreign body has rotated and now sits  slightly higher in the pelvis    I have personally reviewed the examination and initial interpretation  and I agree with the findings.    ELE DENSON MD   XR Abdomen Port 1 View    Narrative    EXAM: XR ABDOMEN PORT 1 VW  2/20/2019 5:39 PM      HISTORY: colonic foreign body, eval for free air and NG tube placement    COMPARISON: Pelvic radiographs 2/13/2019    FINDINGS: Gastric tube coils within the stomach with the tip  projecting over the gastric cardia. Nonobstructive bowel gas pattern.  No pneumatosis. No portal venous gas. Pelvic foreign body not  visualized.      Impression    IMPRESSION:   1. Gastric tube coils within the stomach with the tip projecting over  the gastric cardia.  2. Nonobstructive bowel gas pattern.         I have personally reviewed the examination and initial interpretation  and I agree with the findings.    ABAD LEVIN MD   XR Pelvis Port 1/2 Views    Narrative    2 views pelvis radiograph(s) 2/20/2019 5:53 PM    History: rectal foreign body    Comparison: 2/18/2019    Findings:    AP and lateral view(s) of the pelvis were obtained.     Redemonstration of radiopaque foreign body with continued proximal  migration, now in the area of the descending colon.    IUD in place. Pelvic phlebolith.    No acute osseous abnormality.    Mild degenerative change of bilateral hips with relative preservation  joint spaces.    Sacrum and innominate bones are partially obscured by overlying bowel  gas/fecal content.    Lumbosacral transitional anatomy with pseudoarthrosis on  right.  Degenerative change of right sacroiliac joint.       Impression    Impression: Continued proximal migration of radiopaque foreign body,  now migrated in the area of the descending colon.    RIVER MULLINS              Medications Prior to Admission:     Medications Prior to Admission   Medication Sig Dispense Refill Last Dose     CLONIDINE HCL PO Take 0.05 mg by mouth 2 times daily    2/18/2019 at Unknown time     Desvenlafaxine Succinate (PRISTIQ PO) Take 100 mg by mouth every morning    2/18/2019 at Unknown time     gabapentin (NEURONTIN) 100 MG capsule Take 300 mg by mouth 2 times daily        lurasidone (LATUDA) 60 MG TABS tablet Take 60 mg by mouth At Bedtime        albuterol (PROAIR HFA) 108 (90 Base) MCG/ACT inhaler Inhale 2 puffs into the lungs 4 times daily as needed        Cholecalciferol (VITAMIN D3 PO) Take 2,000 Units by mouth every morning    8/3/2018 at Unknown time     MELATONIN PO Take 3 mg by mouth nightly as needed (sleep)   Unknown at Unknown time     [DISCONTINUED] lurasidone (LATUDA) 20 MG TABS tablet Take 2 tablets (40 mg) by mouth every evening 12 tablet 0 2/18/2019 at Unknown time              Discharge Medications:     Current Discharge Medication List      CONTINUE these medications which have NOT CHANGED    Details   CLONIDINE HCL PO Take 0.05 mg by mouth 2 times daily       Desvenlafaxine Succinate (PRISTIQ PO) Take 100 mg by mouth every morning       gabapentin (NEURONTIN) 100 MG capsule Take 300 mg by mouth 2 times daily      lurasidone (LATUDA) 60 MG TABS tablet Take 60 mg by mouth At Bedtime      albuterol (PROAIR HFA) 108 (90 Base) MCG/ACT inhaler Inhale 2 puffs into the lungs 4 times daily as needed      Cholecalciferol (VITAMIN D3 PO) Take 2,000 Units by mouth every morning       MELATONIN PO Take 3 mg by mouth nightly as needed (sleep)                     Medications Discontinued or Adjusted During This Hospitalization:   No change           Antibiotics Prescribed at  Discharge:   None prescribed           Day of Discharge Physical Exam:   Temp:  [98.2  F (36.8  C)-98.8  F (37.1  C)] 98.2  F (36.8  C)  Pulse:  [72-92] 72  Heart Rate:  [76-97] 85  Resp:  [12-20] 18  BP: (122-147)/(73-95) 122/78  SpO2:  [91 %-100 %] 95 %    General: awake, alert, no acute distress, laying comfortably in bed   CV: warm, well perfused   Pulm: breathing comfortably on room air   Abdomen: soft, non-distended, non-tender; well healed midline scar   Extremities: no edema, moving all extremities spontaneously and without apparent deficit            Discharge Instructions and Follow-Up:     Discharge Procedure Orders   Reason for your hospital stay   Order Comments: You were in the hospital for a rectal foreign body.     Adult [facility] Follow-up and recommended labs and tests   Order Comments: 1. Follow up with your therapist as reportedly arranged on 2/22/19.    2. You may follow up with Dr. Haynes at the [facility] Colorectal Surgery clinic as needed. Contact our clinic scheduler Gretel Castaneda or Marybeth Lafleur (phone # [phone]) to schedule a follow-up appointment as needed.    3.  Follow up with your primary care provider in 1-2 weeks after discharge from the hospital to review this hospitalization.     Appointments on [place] and/or [facility] (with [facility] or [facility] provider or service). Call [phone] if you haven't heard regarding these appointments within 7 days of discharge.     Activity   Order Comments: Your activity upon discharge: activity as tolerated     Order Specific Question Answer Comments   Is discharge order? Yes      Discharge Instructions   Order Comments: Activity:  After your procedure, there are no restrictions on your activity   except restrictions surrounding being on narcotics and in pain, such as no heavy machine operating or driving.   You may walk, climb stairs, ride in a car, and sit as tolerated.   It is helpful to avoid sitting in one position for long periods  (2 or more hours).     When to call:  When do I need to call the doctor or triage nurse?  If you experience any of the problems listed here, call our triage nurse during business hours ([phone]).   The nurse will help you with your problem or have the doctor call you.   After hours and on weekends, please call the main hospital number ([phone]) and ask for the colon and rectal surgery person on call.   Some is available to help you 24 hours a day, seven days a week.  Call for:                 ? Fever greater than 101 degrees                 ? Chills                 ? Foul-smelling drainage                 ? Nausea and vomiting                 ? Diarrhea - greater than 3 water stools in 24 hours                 ? Constipation - no bowel movement after 3 days                 ? Severe bleeding that does not stop soon after a bowel movement                 ? Problems such as increased abdominal pain or rectal pain, swelling, or redness        Diet   Order Comments: Follow this diet upon discharge: Regular     Order Specific Question Answer Comments   Is discharge order? Yes               Home Health Care:   Not needed           Discharge Disposition:   Discharged to home      Condition at discharge: Stable    Patient was seen and examined on day of discharge with CRS fellow, Dr. Wyatt, who discussed with staff, Dr. Haynes.    Alfred Hernandez MD  General Surgery, PGY-1  Pager [phone]      Staff Addendum:  Agree with the discharge summary as documented. I was personally involved with the discharge planning and hospital decision-making for this patient.  Annmarie Haynes MD  Colon and Rectal Surgery Staff  [facility]

## 2019-02-22 ENCOUNTER — TELEPHONE (OUTPATIENT)
Dept: INTERNAL MEDICINE | Facility: CLINIC | Age: 29
End: 2019-02-22

## 2019-02-22 ENCOUNTER — PATIENT OUTREACH (OUTPATIENT)
Dept: CARE COORDINATION | Facility: CLINIC | Age: 29
End: 2019-02-22

## 2019-02-22 NOTE — PROGRESS NOTES
Clinic Care Coordination Contact    Situation: Patient chart reviewed by care coordinator.      Background: MH pt is managed through [facility], has MH CM, Psychiatry, therapy in place. Pt only utilizes [facility]. PCP with [facility]      Assessment: Chronic/Persistantly MI      Plan/Recommendations: Not open to [facility] services. Not a [facility] pt.       LEATHA Mayers  Care Coordinator  [phone]  [email or web address]

## 2019-02-24 ENCOUNTER — HOSPITAL ENCOUNTER (OUTPATIENT)
Facility: CLINIC | Age: 29
Discharge: HOME | End: 2019-02-25
Attending: EMERGENCY MEDICINE | Admitting: COLON & RECTAL SURGERY
Payer: MEDICARE

## 2019-02-24 ENCOUNTER — APPOINTMENT (OUTPATIENT)
Dept: GENERAL RADIOLOGY | Facility: CLINIC | Age: 29
End: 2019-02-24
Attending: EMERGENCY MEDICINE
Payer: MEDICARE

## 2019-02-24 DIAGNOSIS — F60.3 BORDERLINE PERSONALITY DISORDER (H): ICD-10-CM

## 2019-02-24 DIAGNOSIS — T18.5XXD: ICD-10-CM

## 2019-02-24 DIAGNOSIS — T18.5XXA FOREIGN BODY IN ANUS AND RECTUM, INITIAL ENCOUNTER: ICD-10-CM

## 2019-02-24 PROCEDURE — 99285 EMERGENCY DEPT VISIT HI MDM: CPT | Mod: Z6 | Performed by: EMERGENCY MEDICINE

## 2019-02-24 PROCEDURE — 99285 EMERGENCY DEPT VISIT HI MDM: CPT | Mod: 25 | Performed by: EMERGENCY MEDICINE

## 2019-02-24 PROCEDURE — 25000132 ZZH RX MED GY IP 250 OP 250 PS 637: Mod: GY | Performed by: STUDENT IN AN ORGANIZED HEALTH CARE EDUCATION/TRAINING PROGRAM

## 2019-02-24 PROCEDURE — 74019 RADEX ABDOMEN 2 VIEWS: CPT

## 2019-02-24 PROCEDURE — 12000001 ZZH R&B MED SURG/OB UMMC

## 2019-02-24 PROCEDURE — A9270 NON-COVERED ITEM OR SERVICE: HCPCS | Mod: GY | Performed by: STUDENT IN AN ORGANIZED HEALTH CARE EDUCATION/TRAINING PROGRAM

## 2019-02-24 PROCEDURE — 90791 PSYCH DIAGNOSTIC EVALUATION: CPT

## 2019-02-24 PROCEDURE — 40000556 ZZH STATISTIC PERIPHERAL IV START W US GUIDANCE

## 2019-02-24 RX ORDER — ONDANSETRON 2 MG/ML
4 INJECTION INTRAMUSCULAR; INTRAVENOUS EVERY 6 HOURS PRN
Status: DISCONTINUED | OUTPATIENT
Start: 2019-02-24 | End: 2019-02-25 | Stop reason: HOSPADM

## 2019-02-24 RX ORDER — ALBUTEROL SULFATE 90 UG/1
2 AEROSOL, METERED RESPIRATORY (INHALATION) 4 TIMES DAILY PRN
Status: DISCONTINUED | OUTPATIENT
Start: 2019-02-24 | End: 2019-02-25 | Stop reason: HOSPADM

## 2019-02-24 RX ORDER — PROCHLORPERAZINE 25 MG
25 SUPPOSITORY, RECTAL RECTAL EVERY 12 HOURS PRN
Status: DISCONTINUED | OUTPATIENT
Start: 2019-02-24 | End: 2019-02-25 | Stop reason: HOSPADM

## 2019-02-24 RX ORDER — SODIUM CHLORIDE, SODIUM LACTATE, POTASSIUM CHLORIDE, CALCIUM CHLORIDE 600; 310; 30; 20 MG/100ML; MG/100ML; MG/100ML; MG/100ML
1000 INJECTION, SOLUTION INTRAVENOUS CONTINUOUS
Status: DISCONTINUED | OUTPATIENT
Start: 2019-02-24 | End: 2019-02-25

## 2019-02-24 RX ORDER — GABAPENTIN 300 MG/1
300 CAPSULE ORAL 2 TIMES DAILY
Status: DISCONTINUED | OUTPATIENT
Start: 2019-02-24 | End: 2019-02-25 | Stop reason: HOSPADM

## 2019-02-24 RX ORDER — ONDANSETRON 4 MG/1
4 TABLET, ORALLY DISINTEGRATING ORAL EVERY 6 HOURS PRN
Status: DISCONTINUED | OUTPATIENT
Start: 2019-02-24 | End: 2019-02-25 | Stop reason: HOSPADM

## 2019-02-24 RX ORDER — ACETAMINOPHEN 325 MG/1
650 TABLET ORAL EVERY 4 HOURS PRN
Status: DISCONTINUED | OUTPATIENT
Start: 2019-02-24 | End: 2019-02-25 | Stop reason: HOSPADM

## 2019-02-24 RX ORDER — CLONIDINE HYDROCHLORIDE 0.1 MG/1
0.05 TABLET ORAL 2 TIMES DAILY
Status: DISCONTINUED | OUTPATIENT
Start: 2019-02-24 | End: 2019-02-25 | Stop reason: HOSPADM

## 2019-02-24 RX ORDER — POLYETHYLENE GLYCOL 3350 17 G/17G
17 POWDER, FOR SOLUTION ORAL DAILY PRN
Status: DISCONTINUED | OUTPATIENT
Start: 2019-02-24 | End: 2019-02-25 | Stop reason: HOSPADM

## 2019-02-24 RX ORDER — DESVENLAFAXINE 50 MG/1
100 TABLET, FILM COATED, EXTENDED RELEASE ORAL EVERY MORNING
Status: DISCONTINUED | OUTPATIENT
Start: 2019-02-25 | End: 2019-02-25 | Stop reason: HOSPADM

## 2019-02-24 RX ORDER — LIDOCAINE 40 MG/G
CREAM TOPICAL
Status: DISCONTINUED | OUTPATIENT
Start: 2019-02-24 | End: 2019-02-25 | Stop reason: HOSPADM

## 2019-02-24 RX ORDER — NALOXONE HYDROCHLORIDE 0.4 MG/ML
.1-.4 INJECTION, SOLUTION INTRAMUSCULAR; INTRAVENOUS; SUBCUTANEOUS
Status: DISCONTINUED | OUTPATIENT
Start: 2019-02-24 | End: 2019-02-25 | Stop reason: HOSPADM

## 2019-02-24 RX ORDER — PROCHLORPERAZINE MALEATE 5 MG
10 TABLET ORAL EVERY 6 HOURS PRN
Status: DISCONTINUED | OUTPATIENT
Start: 2019-02-24 | End: 2019-02-25 | Stop reason: HOSPADM

## 2019-02-24 RX ADMIN — GABAPENTIN 300 MG: 300 CAPSULE ORAL at 23:55

## 2019-02-24 RX ADMIN — ACETAMINOPHEN 650 MG: 325 TABLET, FILM COATED ORAL at 23:55

## 2019-02-24 ASSESSMENT — ACTIVITIES OF DAILY LIVING (ADL)
DRESS: 0-->INDEPENDENT
TOILETING: 0-->INDEPENDENT
RETIRED_COMMUNICATION: 0-->UNDERSTANDS/COMMUNICATES WITHOUT DIFFICULTY
RETIRED_EATING: 0-->INDEPENDENT
BATHING: 0-->INDEPENDENT
SWALLOWING: 0-->SWALLOWS FOODS/LIQUIDS WITHOUT DIFFICULTY

## 2019-02-24 ASSESSMENT — ENCOUNTER SYMPTOMS: ABDOMINAL PAIN: 0

## 2019-02-25 ENCOUNTER — NURSE TRIAGE (OUTPATIENT)
Dept: NURSING | Facility: CLINIC | Age: 29
End: 2019-02-25

## 2019-02-25 VITALS
DIASTOLIC BLOOD PRESSURE: 78 MMHG | SYSTOLIC BLOOD PRESSURE: 142 MMHG | TEMPERATURE: 98.4 F | HEART RATE: 91 BPM | WEIGHT: 254 LBS | BODY MASS INDEX: 46.46 KG/M2 | RESPIRATION RATE: 18 BRPM | OXYGEN SATURATION: 98 %

## 2019-02-25 LAB
ALBUMIN SERPL-MCNC: 3.1 G/DL (ref 3.4–5)
ALP SERPL-CCNC: 80 U/L (ref 40–150)
ALT SERPL W P-5'-P-CCNC: 20 U/L (ref 0–50)
ANION GAP SERPL CALCULATED.3IONS-SCNC: 8 MMOL/L (ref 3–14)
AST SERPL W P-5'-P-CCNC: 10 U/L (ref 0–45)
BILIRUB SERPL-MCNC: 0.4 MG/DL (ref 0.2–1.3)
BUN SERPL-MCNC: 11 MG/DL (ref 7–30)
CALCIUM SERPL-MCNC: 8.6 MG/DL (ref 8.5–10.1)
CHLORIDE SERPL-SCNC: 110 MMOL/L (ref 94–109)
CO2 SERPL-SCNC: 24 MMOL/L (ref 20–32)
CREAT SERPL-MCNC: 0.55 MG/DL (ref 0.52–1.04)
ERYTHROCYTE [DISTWIDTH] IN BLOOD BY AUTOMATED COUNT: 14.6 % (ref 10–15)
GFR SERPL CREATININE-BSD FRML MDRD: >90 ML/MIN/{1.73_M2}
GLUCOSE SERPL-MCNC: 104 MG/DL (ref 70–99)
HCT VFR BLD AUTO: 39 % (ref 35–47)
HGB BLD-MCNC: 12.4 G/DL (ref 11.7–15.7)
MAGNESIUM SERPL-MCNC: 2.1 MG/DL (ref 1.6–2.3)
MCH RBC QN AUTO: 29.1 PG (ref 26.5–33)
MCHC RBC AUTO-ENTMCNC: 31.8 G/DL (ref 31.5–36.5)
MCV RBC AUTO: 92 FL (ref 78–100)
PHOSPHATE SERPL-MCNC: 4.1 MG/DL (ref 2.5–4.5)
PLATELET # BLD AUTO: 229 10E9/L (ref 150–450)
POTASSIUM SERPL-SCNC: 3.6 MMOL/L (ref 3.4–5.3)
PROT SERPL-MCNC: 6.3 G/DL (ref 6.8–8.8)
RBC # BLD AUTO: 4.26 10E12/L (ref 3.8–5.2)
SODIUM SERPL-SCNC: 142 MMOL/L (ref 133–144)
WBC # BLD AUTO: 10.3 10E9/L (ref 4–11)

## 2019-02-25 PROCEDURE — G0378 HOSPITAL OBSERVATION PER HR: HCPCS

## 2019-02-25 PROCEDURE — 25000132 ZZH RX MED GY IP 250 OP 250 PS 637: Mod: GY | Performed by: STUDENT IN AN ORGANIZED HEALTH CARE EDUCATION/TRAINING PROGRAM

## 2019-02-25 PROCEDURE — 83735 ASSAY OF MAGNESIUM: CPT | Performed by: COLON & RECTAL SURGERY

## 2019-02-25 PROCEDURE — A9270 NON-COVERED ITEM OR SERVICE: HCPCS | Mod: GY | Performed by: STUDENT IN AN ORGANIZED HEALTH CARE EDUCATION/TRAINING PROGRAM

## 2019-02-25 PROCEDURE — 85027 COMPLETE CBC AUTOMATED: CPT | Performed by: COLON & RECTAL SURGERY

## 2019-02-25 PROCEDURE — 99214 OFFICE O/P EST MOD 30 MIN: CPT | Performed by: PSYCHIATRY & NEUROLOGY

## 2019-02-25 PROCEDURE — 80053 COMPREHEN METABOLIC PANEL: CPT | Performed by: COLON & RECTAL SURGERY

## 2019-02-25 PROCEDURE — 25800030 ZZH RX IP 258 OP 636: Performed by: STUDENT IN AN ORGANIZED HEALTH CARE EDUCATION/TRAINING PROGRAM

## 2019-02-25 PROCEDURE — 84100 ASSAY OF PHOSPHORUS: CPT | Performed by: COLON & RECTAL SURGERY

## 2019-02-25 PROCEDURE — 36415 COLL VENOUS BLD VENIPUNCTURE: CPT | Performed by: COLON & RECTAL SURGERY

## 2019-02-25 RX ORDER — LANOLIN ALCOHOL/MO/W.PET/CERES
3 CREAM (GRAM) TOPICAL
Status: DISCONTINUED | OUTPATIENT
Start: 2019-02-25 | End: 2019-02-25 | Stop reason: HOSPADM

## 2019-02-25 RX ADMIN — MELATONIN 3 MG: 3 TAB ORAL at 00:31

## 2019-02-25 RX ADMIN — CLONIDINE HYDROCHLORIDE 0.05 MG: 0.1 TABLET ORAL at 08:36

## 2019-02-25 RX ADMIN — CLONIDINE HYDROCHLORIDE 0.05 MG: 0.1 TABLET ORAL at 00:31

## 2019-02-25 RX ADMIN — LURASIDONE HYDROCHLORIDE 60 MG: 40 TABLET, FILM COATED ORAL at 00:31

## 2019-02-25 RX ADMIN — VITAMIN D, TAB 1000IU (100/BT) 2000 UNITS: 25 TAB at 08:35

## 2019-02-25 RX ADMIN — GABAPENTIN 300 MG: 300 CAPSULE ORAL at 08:35

## 2019-02-25 RX ADMIN — SODIUM CHLORIDE, POTASSIUM CHLORIDE, SODIUM LACTATE AND CALCIUM CHLORIDE 1000 ML: 600; 310; 30; 20 INJECTION, SOLUTION INTRAVENOUS at 00:33

## 2019-02-25 RX ADMIN — DESVENLAFAXINE 100 MG: 50 TABLET, FILM COATED, EXTENDED RELEASE ORAL at 08:34

## 2019-02-25 ASSESSMENT — ACTIVITIES OF DAILY LIVING (ADL)
ADLS_ACUITY_SCORE: 11

## 2019-02-25 ASSESSMENT — PAIN DESCRIPTION - DESCRIPTORS
DESCRIPTORS: CRAMPING
DESCRIPTORS: ACHING

## 2019-02-25 NOTE — PROGRESS NOTES
Phone call placed to [facility].  Pt has not been seen by [facility] since 12-.  Assigned  through [facility] is Becca Dumont ([phone]/  [phone]).

## 2019-02-25 NOTE — UTILIZATION REVIEW
"  Admission Status; Secondary Review Determination         Under the authority of the Utilization Management Committee, the utilization review process indicated a secondary review on the above patient.  The review outcome is based on review of the medical records, discussions with staff, and applying clinical experience noted on the date of the review.        ()      Inpatient Status Appropriate - This patient's medical care is consistent with medical management for inpatient care and reasonable inpatient medical practice.      (X) Observation Status Appropriate - This patient does not meet hospital inpatient criteria and is placed in observation status. If this patient's primary payer is Medicare and was admitted as an inpatient, Condition Code 44 should be used and patient status changed to \"observation\".   () Admission Status NOT Appropriate - This patient's medical care is not consistent with medical management for Inpatient or Observation Status.          RATIONALE FOR DETERMINATION     28 year old female with a medical history significant for multiple recurrent foreign body ingestion and rectal insertions, psychiatric disease, chronic pain, borderline personality disorder, depression, and anxiety who presented to [facility] as a direct admission from [facility] for treatment of foreign body in rectum during masturbation.  She had mild rectal pain.  She was placed on a 72 hour hold.  Her vital signs were normal.  The rectal foreign body was passed with [facility].  Psychiatric plan is for outpatient follow up.  I would recommend Observation status.  I have spoken to Dr Melchor.         The severity of illness, intensity of service provided, expected LOS and risk for adverse outcome make the care complex, high risk and appropriate for hospital admission.        The information on this document is developed by the utilization review team in order for the business office to ensure compliance.  This " only denotes the appropriateness of proper admission status and does not reflect the quality of care rendered.         The definitions of Inpatient Status and Observation Status used in making the determination above are those provided in the CMS Coverage Manual, Chapter 1 and Chapter 6, section 70.4.      Sincerely,     Barney Vinson MD  Physician Advisor  Utilization Review/ Case Management  [facility].

## 2019-02-25 NOTE — H&P
"GENERAL SURGERY ADMISSION HISTORY & PHYSICAL   Nevin Alvarado MRN# [id number]   YOB: 1990 Age: 28 year old     Date of Admission: 2/24/2019    CHIEF COMPLAINT: Rectal foreign body    HISTORY OF PRESENTING ILLNESS: Nevin Alvarado is a 28 year old female with a medical history significant for multiple recurrent foreign body ingestion and inserted rectally, psychiatric disease, chronic pain, borderline personality disorder, depression, and anxiety who presents to [facility] as a direct admission from [facility] for treatment of foreign body in rectum during masturbation. Patient was here last week for the same thing and states, \"I just can't seem to learn\". She complains of minor rectal pain. She denies foreign body ingestion. She denies suicidal ideations. She denies abdominal pain. Denies fevers, chills, chest pain, SOB.     Patient was seen here on 2/18/19, 6 days ago, for rectal foreign body undergoing colonoscopy for removal of rectal foreign body. Patient has a long history of foreign body rectal insertion and ingestion and has undergone multiple operations for removal.    REVIEW OF SYSTEMS: The remainder of the complete ROS was negative unless noted in the HPI.    PAST MEDICAL HISTORY:   Past Medical History:   Diagnosis Date     ADD (attention deficit disorder)      Anorexia nervosa with bulimia     history of; on Topamax     Anxiety      Borderline personality disorder (H)      Depression      Depressive disorder      H/O self-harm      Lives in independent group home     due to debilitating mental illness     Migraine without aura     no known triggers; on Topamax bid and Imitrex PRN     Morbid obesity (H)      PTSD (post-traumatic stress disorder)      Rectal foreign body - Recurrent issue, self placed      Swallowed foreign body - Recurrent issue, self placed        PAST SURGICAL HISTORY:   Past Surgical History:   Procedure Laterality Date     ESOPHAGOSCOPY, " GASTROSCOPY, DUODENOSCOPY (EGD), COMBINED N/A 3/9/2017    Procedure: COMBINED ESOPHAGOSCOPY, GASTROSCOPY, DUODENOSCOPY (EGD), REMOVE FOREIGN BODY;  Surgeon: Avis Guzmán MD;  Location: [facility]     ESOPHAGOSCOPY, GASTROSCOPY, DUODENOSCOPY (EGD), COMBINED N/A 4/20/2017    Procedure: COMBINED ESOPHAGOSCOPY, GASTROSCOPY, DUODENOSCOPY (EGD), REMOVE FOREIGN BODY;  EGD removal Foregin body;  Surgeon: Lokesh Paula MD;  Location: [facility]     ESOPHAGOSCOPY, GASTROSCOPY, DUODENOSCOPY (EGD), COMBINED N/A 6/12/2017    Procedure: COMBINED ESOPHAGOSCOPY, GASTROSCOPY, DUODENOSCOPY (EGD);  COMBINED ESOPHAGOSCOPY, GASTROSCOPY, DUODENOSCOPY (EGD) [[id number]]attempted removal of foreign body;  Surgeon: Pamela Perez MD;  Location: [facility]     ESOPHAGOSCOPY, GASTROSCOPY, DUODENOSCOPY (EGD), COMBINED N/A 6/9/2017    Procedure: COMBINED ESOPHAGOSCOPY, GASTROSCOPY, DUODENOSCOPY (EGD), REMOVE FOREIGN BODY;  Esophagoscopy, Gastroscopy, Duodenoscopy, Removal of Foreign Body;  Surgeon: Dejon Marsh MD;  Location: [facility]     ESOPHAGOSCOPY, GASTROSCOPY, DUODENOSCOPY (EGD), COMBINED N/A 1/6/2018    Procedure: COMBINED ESOPHAGOSCOPY, GASTROSCOPY, DUODENOSCOPY (EGD), REMOVE FOREIGN BODY;  COMBINED ESOPHAGOSCOPY, GASTROSCOPY, DUODENOSCOPY (EGD) [by pascal net and snare with profol sedation;  Surgeon: Feliciano Emmanuel MD;  Location:  GI     ESOPHAGOSCOPY, GASTROSCOPY, DUODENOSCOPY (EGD), COMBINED N/A 3/19/2018    Procedure: COMBINED ESOPHAGOSCOPY, GASTROSCOPY, DUODENOSCOPY (EGD), REMOVE FOREIGN BODY;   Esophagodscopy, Gastroscopy, Duodenoscopy,Foreign Body Removal;  Surgeon: Brice Guzmán MD;  [facility]     ESOPHAGOSCOPY, GASTROSCOPY, DUODENOSCOPY (EGD), COMBINED N/A 4/16/2018    Procedure: COMBINED ESOPHAGOSCOPY, GASTROSCOPY, DUODENOSCOPY (EGD), REMOVE FOREIGN BODY;  Esophagogastroduodenoscopy  Foreign Body Removal X 2;  Surgeon: Royer Moise MD;  [facility]      ESOPHAGOSCOPY, GASTROSCOPY, DUODENOSCOPY (EGD), COMBINED N/A 6/1/2018    Procedure: COMBINED ESOPHAGOSCOPY, GASTROSCOPY, DUODENOSCOPY (EGD), REMOVE FOREIGN BODY;  COMBINED ESOPHAGOSCOPY, GASTROSCOPY, DUODENOSCOPY with removal of foreign body, propofol sedation by anesthesia;  Surgeon: Brice Martinez MD;  Location:  GI     ESOPHAGOSCOPY, GASTROSCOPY, DUODENOSCOPY (EGD), COMBINED N/A 7/25/2018    Procedure: COMBINED ESOPHAGOSCOPY, GASTROSCOPY, DUODENOSCOPY (EGD), REMOVE FOREIGN BODY;;  Surgeon: Candy Castelan MD;  Location:  GI     ESOPHAGOSCOPY, GASTROSCOPY, DUODENOSCOPY (EGD), COMBINED N/A 7/28/2018    Procedure: COMBINED ESOPHAGOSCOPY, GASTROSCOPY, DUODENOSCOPY (EGD), REMOVE FOREIGN BODY;  COMBINED ESOPHAGOSCOPY, GASTROSCOPY, DUODENOSCOPY (EGD), REMOVE FOREIGN BODY;  Surgeon: Brice Guzmán MD;  [facility]: [facility]     ESOPHAGOSCOPY, GASTROSCOPY, DUODENOSCOPY (EGD), COMBINED N/A 7/31/2018    Procedure: COMBINED ESOPHAGOSCOPY, GASTROSCOPY, DUODENOSCOPY (EGD);  COMBINED ESOPHAGOSCOPY, GASTROSCOPY, DUODENOSCOPY (EGD) TO REMOVE FOREIGN BODY;  Surgeon: Lokesh Paula MD;  [facility]     ESOPHAGOSCOPY, GASTROSCOPY, DUODENOSCOPY (EGD), COMBINED N/A 8/4/2018    Procedure: COMBINED ESOPHAGOSCOPY, GASTROSCOPY, DUODENOSCOPY (EGD), REMOVE FOREIGN BODY;   combined esophagoscopy, gastroscopy, duodenoscopy, REMOVE FOREIGN BODY ;  Surgeon: Lokesh Paula MD;  [facility]     EXAM UNDER ANESTHESIA ANUS N/A 1/10/2017    Procedure: EXAM UNDER ANESTHESIA ANUS;  Surgeon: Annmarie Haynes MD;  [facility]     EXAM UNDER ANESTHESIA RECTUM N/A 7/19/2018    Procedure: EXAM UNDER ANESTHESIA RECTUM;  EXAM UNDER ANESTHESIA, REMOVAL OF RECTAL FOREIGN BODY;  Surgeon: Annmarie Haynes MD;  Location: [facility]     HC REMOVE FECAL IMPACTION OR FB W ANESTHESIA N/A 12/18/2016    Procedure: REMOVE FECAL IMPACTION/FOREIGN BODY UNDER ANESTHESIA;  Surgeon: Soham Cano MD;  Location:  OR      KNEE SURGERY - removed a small tissue mass from knee Right      LAPAROSCOPIC ABLATION ENDOMETRIOSIS       LAPAROTOMY EXPLORATORY N/A 1/10/2017    Procedure: LAPAROTOMY EXPLORATORY;  Surgeon: Annmarie Haynes MD;  Location: [facility]     lymph nodes removed from neck; benign  age 7     MAMMOPLASTY REDUCTION Bilateral      RELEASE CARPAL TUNNEL Bilateral      SIGMOIDOSCOPY FLEXIBLE N/A 1/10/2017    Procedure: SIGMOIDOSCOPY FLEXIBLE;  Surgeon: Annmarie Haynes MD;  [facility]: [facility]     SIGMOIDOSCOPY FLEXIBLE N/A 5/8/2018    Procedure: SIGMOIDOSCOPY FLEXIBLE;  flex sig with foreign body removal using snare and rattooth forcep;  Surgeon: Soham Cano MD;  Location: [facility]     SIGMOIDOSCOPY FLEXIBLE N/A 2/20/2019    Procedure: Exam under anesthesia Colonoscopy with attempted  removal of rectal foreign body;  Surgeon: Estrada Chávez MD;  Location: [facility]       ALLERGIES:    Allergies   Allergen Reactions     Amoxicillin-Pot Clavulanate Other (See Comments), Rash and Swelling     PN: facial swelling, left side. Also had cortisone injection the same day as she started the Augmentin.  PN: facial swelling, left side. Also had cortisone injection the same day as she started the Augmentin.  Noted in downtime recovery of chart.       Penicillins Anaphylaxis     Blood-Group Specific Substance Other (See Comments)     Patient has an anti-Cw and non-specific antibodies. Blood product orders may be delayed. Draw one red top and two purple top tubes for all type/screen/crossmatch orders.     Hydrocodone Nausea and Vomiting     vomiting for days     Influenza Vaccines Other (See Comments)     Oseltamivir Hives     med stopped, PN: med stopped     Vicodin [Hydrocodone-Acetaminophen] Nausea and Vomiting     Cephalosporins Rash     Lamotrigine Rash     Possibly associated with Lamictal.      Latex Rash       HOME MEDICATIONS:   No current facility-administered medications on file prior to encounter.   Current  Outpatient Medications on File Prior to Encounter:  Cholecalciferol (VITAMIN D3 PO) Take 2,000 Units by mouth every morning    CLONIDINE HCL PO Take 0.05 mg by mouth 2 times daily    Desvenlafaxine Succinate (PRISTIQ PO) Take 100 mg by mouth every morning    gabapentin (NEURONTIN) 100 MG capsule Take 300 mg by mouth 2 times daily   lurasidone (LATUDA) 60 MG TABS tablet Take 60 mg by mouth At Bedtime   albuterol (PROAIR HFA) 108 (90 Base) MCG/ACT inhaler Inhale 2 puffs into the lungs 4 times daily as needed       SOCIAL HISTORY:   Social History     Tobacco Use     Smoking status: Never Smoker     Smokeless tobacco: Never Used   Substance Use Topics     Alcohol use: No     Alcohol/week: 0.0 oz     Drug use: No       FAMILY HISTORY:   Family History   Problem Relation Age of Onset     Diabetes Type 2  Maternal Grandmother      Diabetes Type 2  Paternal Grandmother      Breast Cancer Paternal Grandmother      Cerebrovascular Disease Father 53     Myocardial Infarction No family hx of      Coronary Artery Disease Early Onset No family hx of      Ovarian Cancer No family hx of      Colon Cancer No family hx of        PHYSICAL EXAMINATION:  Temp:  [99.6  F (37.6  C)-100.4  F (38  C)] 99.6  F (37.6  C)  Pulse:  [103] 103  Heart Rate:  [102-104] 104  Resp:  [16-18] 18  BP: (124-157)/(69-94) 124/86  SpO2:  [96 %-98 %] 96 %   No intake/output data recorded.    General: AAO, NAD, sitting up in bed, appears comfortable. Calm and cooperative with exam.  HEENT: PERRL, EOMI. Mild conjunctival injection bilaterally. Mucous membranes moist.  CV: RRR, no murmurs, gallops, or rubs.  Pulm: CTAB, breathing comfortably on room air.  Abd: soft, nontender, nondistended to palpation. No organomegaly or masses noted.  Rectal: External anus visually inspected, no lacerations, abrasions, or erythema. Normal rectal tone. No foreign body palpated on digital rectal examination. No blood or stool in rectal vault.  Extremities: warm, well-perfused  without edema. Peripheral pulses intact bilateral upper and lower extremities.  Neuro: No focal deficits noted, patient moves all extremities spontaneously. 5/5 strength bilateral upper and lower extremities.  Skin: Warm and dry. No rashes, lesions, or petechiae.    LABS:  Recent Labs   Lab 02/18/19 2023   WBC 15.3*   RBC 4.36   HGB 12.8   HCT 39.5   MCV 91   MCH 29.4   MCHC 32.4   RDW 14.6          Recent Labs   Lab 02/18/19 2023      POTASSIUM 3.7   CHLORIDE 109   CO2 25   BUN 11   CR 0.52   *   KELBY 8.6       No results for input(s): AST, ALT, ALKPHOS, BILITOTAL, BILIDIRECT, ALBUMIN, INR in the last 168 hours.    Invalid input(s): PROTEINTOT    IMAGING:  AXR, 2 view 2/24/19:  Impression: The gas pattern is normal. No free air. There is a rounded radiopaque foreign body projected over the upper pelvis. This measures about 4 cm in diameter. This appears to represent the same foreign body seen on the film of 2/20/2019. It is now slightly more distal in the sigmoid colon. An IUD is also seen projected over the upper pelvis.    ASSESSMENT: Nevin Alvarado is a 28 year old female with a h/o multiple recurrent foreign body ingestion and inserted rectally with recent admission to [facility] 2/18 for rectally inserted nail polish bottle, psychiatric disease, chronic pain, borderline personality disorder, depression, and anxiety presenting with recurrent rectal foreign body.     PLAN:    - Admit to colorectal surgery under Dr. Haynes.  - NPO except for medications  - LR at 100 ml/hr  - Tylenol for pain only - avoid narcotics  - Zofran/compazine  - Go Lytely at 150 ml/hr - patient elected to drink, but if cannot tolerate will place NG tube  - Home psych medications  - AM labs  - Transfer to psychiatry once nail polish bottle has passed  - 1:1 nursing in the meantime    Patient findings and plan discussed with the colorectal surgery fellow Dr. Wyatt.      Roya Cheatham MD  General Surgery  (PGY-1)      Staff Addendum:  Agree with the consultation H&P as documented by the housestaff. I was personally involved with the recommendations made by our service for this patient.  Annmarie Haynes MD  Colon and Rectal Surgery Staff  [facility]

## 2019-02-25 NOTE — PLAN OF CARE
HD2 admitted with foreign body obstruction in her rectum. Foreign body was expelled this morning. A&Ox4, VSS on room air, pain is controlled. LSC, BS+ passing gas, LBM 2/24. Voiding adequately, up independent. Discharging to home with follow up with mental health provider and DBT appointment.

## 2019-02-25 NOTE — PLAN OF CARE
Arrived on unit at 2215 from [place]. Admitted for insertion of foreign body (nail polish bottle) into rectum. Hx of foreign body ingestion also. 72 hour hold in place. Bedside attendant in room. Foreign body passed from rectum at 0000. MD notified. AVSS. Lower back pain managed with tylenol + heat. PIV infusing LR at 100ml/hr. NPO status maintained, sips with meds. Melatonin given for sleep.     Plan of care: Tentative transfer to [place] [facility] today.

## 2019-02-25 NOTE — PLAN OF CARE
Pt transferred from [place] ED, arrived to  around 2215. Admitted d/t rectally inserted foreign body, also has a history of foreign body ingestion. On a 72 hour hold. Slightly tachycardic to 104, otherwise VSS. PIV placed with ultrasound. Will start IVF when IV pump arrives. Rectal exam performed by MD. Pt c/o some pain, and would like to take PRN Tylenol once it is verified by Pharmacist. Plan for NPO and Go Lytely. Commode ordered. Sitter at bedside with patient. Continue to monitor and with POC.

## 2019-02-25 NOTE — PROGRESS NOTES
Colorectal Surgery Progress Note  2/25/19    Subjective:  Afebrile, HDS. Was given GoLytle and did pass the foreign body per rectum. Hungry this am, asking for a regular diet.     Vitals:  Vitals:    02/24/19 1925 02/24/19 2204 02/24/19 2220 02/25/19 0231   BP: (!) 157/94 152/69 124/86 115/48   BP Location:   Right arm Right arm   Pulse: 103      Resp: 18 16 18 18   Temp: 100.4  F (38  C)  99.6  F (37.6  C) 96.7  F (35.9  C)   TempSrc: Oral  Oral Oral   SpO2: 98% 97% 96% 92%   Weight: 115.2 kg (254 lb)        I/O:  I/O last 3 completed shifts:  In: 528.33 [P.O.:50; I.V.:478.33]  Out: -     Physical Exam:  Gen: AAOx3, NAD  Pulm: Non-labored breathing on room air   Abd: Soft, ND, NT, no guarding or rebound tenderness.   Ext:  Warm and well-perfused    BMP  Recent Labs   Lab 02/25/19  0615 02/18/19 2023    140   POTASSIUM 3.6 3.7   CHLORIDE 110* 109   CO2 24 25   BUN 11 11   CR 0.55 0.52   * 178*   MAG 2.1  --    PHOS 4.1  --      CBC  Recent Labs   Lab 02/25/19  0615 02/18/19 2023   WBC 10.3 15.3*   HGB 12.4 12.8   HCT 39.0 39.5    300     No new imaging this am     ASSESSMENT/PLAN: 27 yo F with a h/o psychiatric disease, chronic pain, borderline personality disorder, depression, anxiety and recent hospitalization for rectally inserted foreign body one week ago who presented to the hospital again on 2/24/19 with another rectally-inserted foreign body. Clinical exam and labs re-assuring. No indication for surgical intervention at this time.     -Discontinue IVF, advance to regular diet   -Tylenol for pain control (avoid narcotics)   -Home psych meds   -Continue 1:1 sitter     Dispo: Given that she has passed the foreign body, will plan to transfer to psychiatry today.       Seen and discussed with CRS fellow Dr. Wyatt
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
## 2019-02-25 NOTE — ED NOTES
[facility], [place]   ED Nurse to Floor Handoff     Nevin Alvarado is a 28 year old female who speaks English and lives alone,  in a home  They arrived in the ED by public transportation from home    ED Chief Complaint: Foreign Body in Rectum (Nail polish bottle in rectum since 6PM.)    ED Dx;   Final diagnoses:   Foreign body of rectum, subsequent encounter         Needed?: No    Allergies:   Allergies   Allergen Reactions     Amoxicillin-Pot Clavulanate Other (See Comments), Rash and Swelling     PN: facial swelling, left side. Also had cortisone injection the same day as she started the Augmentin.  PN: facial swelling, left side. Also had cortisone injection the same day as she started the Augmentin.  Noted in downtime recovery of chart.       Penicillins Anaphylaxis     Blood-Group Specific Substance Other (See Comments)     Patient has an anti-Cw and non-specific antibodies. Blood product orders may be delayed. Draw one red top and two purple top tubes for all type/screen/crossmatch orders.     Hydrocodone Nausea and Vomiting     vomiting for days     Influenza Vaccines Other (See Comments)     Oseltamivir Hives     med stopped, PN: med stopped     Vicodin [Hydrocodone-Acetaminophen] Nausea and Vomiting     Cephalosporins Rash     Lamotrigine Rash     Possibly associated with Lamictal.      Latex Rash   .  Past Medical Hx:   Past Medical History:   Diagnosis Date     ADD (attention deficit disorder)      Anorexia nervosa with bulimia     history of; on Topamax     Anxiety      Borderline personality disorder (H)      Depression      Depressive disorder      H/O self-harm      Lives in independent group home     due to debilitating mental illness     Migraine without aura     no known triggers; on Topamax bid and Imitrex PRN     Morbid obesity (H)      PTSD (post-traumatic stress disorder)      Rectal foreign body - Recurrent issue, self placed      Swallowed  foreign body - Recurrent issue, self placed       Baseline Mental status: WDL  Current Mental Status changes: at basesline    Infection present or suspected this encounter: no  Sepsis suspected: No  Isolation type: No active isolations     Activity level - Baseline/Home:  Independent  Activity Level - Current:   Independent    Bariatric equipment needed?: No    In the ED these meds were given: Medications - No data to display    Drips running?  No    Home pump  No    Current LDAs       Labs results: Labs Ordered and Resulted from Time of ED Arrival Up to the Time of Departure from the ED - No data to display    Imaging Studies:   Recent Results (from the past 24 hour(s))   Abdomen XR, 2 vw, flat and upright    Narrative    XR ABDOMEN 2 VW   2/24/2019 8:12 PM      HISTORY:  forgein body in rectum, rule out other forgein bodies    COMPARISON: Film dated 2/20/2018    FINDINGS: The gas pattern is normal. No free air. There is a rounded  radiopaque foreign body projected over the upper pelvis. This measures  about 4 cm in diameter. This appears to represent the same foreign  body seen on the film of 2/20/2019. It is now slightly more distal in  the sigmoid colon. An IUD is also seen projected over the upper  pelvis.    PELON MELÉNDEZ MD       Recent vital signs:   BP (!) 157/94   Pulse 103   Temp 100.4  F (38  C) (Oral)   Resp 18   Wt 115.2 kg (254 lb)   SpO2 98%   BMI 46.46 kg/m      Cardiac Rhythm:   Pt needs tele? no  Skin/wound Issues: None    Code Status:     Pain control: good    Nausea control: pt had none    Abnormal labs/tests/findings requiring intervention:     Family present during ED course? No   Family Comments/Social Situation comments:     Tasks needing completion: None    IRA RIVERA RN  asc--   [phone] [facility]  [phone] [facility] ED

## 2019-02-25 NOTE — ED PROVIDER NOTES
"  History     Chief Complaint   Patient presents with     Foreign Body in Rectum     Nail polish bottle in rectum since 6PM.     The history is provided by the patient and medical records.     Nevin Alvarado is a 28 year old female with a medical history significant for multiple recurrent foreign body ingestion and inserted rectally, psychiatric disease, chronic pain, borderline personality disorder, depression, and anxiety who presents to the Emergency Department for evaluation of foreign body in rectum during masturbation. Patient reports that she was here last week for the same thing and states, \"I just can't seem to learn\". She complains of minor rectal pain.  Denies abdominal pain.  She denies foreign body ingestion. She denies suicidal ideations, states this was not done in an attempt to hurt herself.    Per chart review, patient was recently seen here on 2/18/19, 6 days ago, for rectal foreign body undergoing colonoscopy for removal of rectal foreign body. Patient has a long history of foreign body rectal insertion and ingestion and has undergone multiple operations for removal.     Past Medical History:   Diagnosis Date     ADD (attention deficit disorder)      Anorexia nervosa with bulimia     history of; on Topamax     Anxiety      Borderline personality disorder (H)      Depression      Depressive disorder      H/O self-harm      Lives in independent group home     due to debilitating mental illness     Migraine without aura     no known triggers; on Topamax bid and Imitrex PRN     Morbid obesity (H)      PTSD (post-traumatic stress disorder)      Rectal foreign body - Recurrent issue, self placed      Swallowed foreign body - Recurrent issue, self placed        Past Surgical History:   Procedure Laterality Date     ESOPHAGOSCOPY, GASTROSCOPY, DUODENOSCOPY (EGD), COMBINED N/A 3/9/2017    Procedure: COMBINED ESOPHAGOSCOPY, GASTROSCOPY, DUODENOSCOPY (EGD), REMOVE FOREIGN BODY;  Surgeon: Ramirez, " Avis Traore MD;  Location: [facility]     ESOPHAGOSCOPY, GASTROSCOPY, DUODENOSCOPY (EGD), COMBINED N/A 4/20/2017    Procedure: COMBINED ESOPHAGOSCOPY, GASTROSCOPY, DUODENOSCOPY (EGD), REMOVE FOREIGN BODY;  EGD removal Foregin body;  Surgeon: Lokesh Paula MD;  Location: [facility]     ESOPHAGOSCOPY, GASTROSCOPY, DUODENOSCOPY (EGD), COMBINED N/A 6/12/2017    Procedure: COMBINED ESOPHAGOSCOPY, GASTROSCOPY, DUODENOSCOPY (EGD);  COMBINED ESOPHAGOSCOPY, GASTROSCOPY, DUODENOSCOPY (EGD) [[id number]]attempted removal of foreign body;  Surgeon: Pamela Perez MD;  Location: [facility]     ESOPHAGOSCOPY, GASTROSCOPY, DUODENOSCOPY (EGD), COMBINED N/A 6/9/2017    Procedure: COMBINED ESOPHAGOSCOPY, GASTROSCOPY, DUODENOSCOPY (EGD), REMOVE FOREIGN BODY;  Esophagoscopy, Gastroscopy, Duodenoscopy, Removal of Foreign Body;  Surgeon: Dejon Marsh MD;  Location: [facility]     ESOPHAGOSCOPY, GASTROSCOPY, DUODENOSCOPY (EGD), COMBINED N/A 1/6/2018    Procedure: COMBINED ESOPHAGOSCOPY, GASTROSCOPY, DUODENOSCOPY (EGD), REMOVE FOREIGN BODY;  COMBINED ESOPHAGOSCOPY, GASTROSCOPY, DUODENOSCOPY (EGD) [by pascal net and snare with profol sedation;  Surgeon: Feliciano Emmanuel MD;  Location:  GI     ESOPHAGOSCOPY, GASTROSCOPY, DUODENOSCOPY (EGD), COMBINED N/A 3/19/2018    Procedure: COMBINED ESOPHAGOSCOPY, GASTROSCOPY, DUODENOSCOPY (EGD), REMOVE FOREIGN BODY;   Esophagodscopy, Gastroscopy, Duodenoscopy,Foreign Body Removal;  Surgeon: Brice Guzmán MD;  [facility]: [facility]     ESOPHAGOSCOPY, GASTROSCOPY, DUODENOSCOPY (EGD), COMBINED N/A 4/16/2018    Procedure: COMBINED ESOPHAGOSCOPY, GASTROSCOPY, DUODENOSCOPY (EGD), REMOVE FOREIGN BODY;  Esophagogastroduodenoscopy  Foreign Body Removal X 2;  Surgeon: Royer Moise MD;  Location: [facility]     ESOPHAGOSCOPY, GASTROSCOPY, DUODENOSCOPY (EGD), COMBINED N/A 6/1/2018    Procedure: COMBINED ESOPHAGOSCOPY, GASTROSCOPY, DUODENOSCOPY (EGD), REMOVE FOREIGN BODY;  COMBINED  ESOPHAGOSCOPY, GASTROSCOPY, DUODENOSCOPY with removal of foreign body, propofol sedation by anesthesia;  Surgeon: Brice Martinez MD;  Location: [facility]     ESOPHAGOSCOPY, GASTROSCOPY, DUODENOSCOPY (EGD), COMBINED N/A 7/25/2018    Procedure: COMBINED ESOPHAGOSCOPY, GASTROSCOPY, DUODENOSCOPY (EGD), REMOVE FOREIGN BODY;;  Surgeon: Candy Castelan MD;  Location:  GI     ESOPHAGOSCOPY, GASTROSCOPY, DUODENOSCOPY (EGD), COMBINED N/A 7/28/2018    Procedure: COMBINED ESOPHAGOSCOPY, GASTROSCOPY, DUODENOSCOPY (EGD), REMOVE FOREIGN BODY;  COMBINED ESOPHAGOSCOPY, GASTROSCOPY, DUODENOSCOPY (EGD), REMOVE FOREIGN BODY;  Surgeon: Brice Guzmán MD;  [facility]: [facility]     ESOPHAGOSCOPY, GASTROSCOPY, DUODENOSCOPY (EGD), COMBINED N/A 7/31/2018    Procedure: COMBINED ESOPHAGOSCOPY, GASTROSCOPY, DUODENOSCOPY (EGD);  COMBINED ESOPHAGOSCOPY, GASTROSCOPY, DUODENOSCOPY (EGD) TO REMOVE FOREIGN BODY;  Surgeon: Lokesh Paula MD;  [facility]: [facility]     ESOPHAGOSCOPY, GASTROSCOPY, DUODENOSCOPY (EGD), COMBINED N/A 8/4/2018    Procedure: COMBINED ESOPHAGOSCOPY, GASTROSCOPY, DUODENOSCOPY (EGD), REMOVE FOREIGN BODY;   combined esophagoscopy, gastroscopy, duodenoscopy, REMOVE FOREIGN BODY ;  Surgeon: Lokesh Paula MD;  Location: [facility]     EXAM UNDER ANESTHESIA ANUS N/A 1/10/2017    Procedure: EXAM UNDER ANESTHESIA ANUS;  Surgeon: Annmarie Haynes MD;  [facility]     EXAM UNDER ANESTHESIA RECTUM N/A 7/19/2018    Procedure: EXAM UNDER ANESTHESIA RECTUM;  EXAM UNDER ANESTHESIA, REMOVAL OF RECTAL FOREIGN BODY;  Surgeon: Annmarie Haynes MD;  Location: [facility]     HC REMOVE FECAL IMPACTION OR FB W ANESTHESIA N/A 12/18/2016    Procedure: REMOVE FECAL IMPACTION/FOREIGN BODY UNDER ANESTHESIA;  Surgeon: Soham Cano MD;  Location: [facility]     KNEE SURGERY - removed a small tissue mass from knee Right      LAPAROSCOPIC ABLATION ENDOMETRIOSIS       LAPAROTOMY EXPLORATORY N/A 1/10/2017    Procedure: LAPAROTOMY  EXPLORATORY;  Surgeon: Annmarie Haynes MD;  Location: [facility]     lymph nodes removed from neck; benign  age 7     MAMMOPLASTY REDUCTION Bilateral      RELEASE CARPAL TUNNEL Bilateral      SIGMOIDOSCOPY FLEXIBLE N/A 1/10/2017    Procedure: SIGMOIDOSCOPY FLEXIBLE;  Surgeon: Annmarie Haynes MD;  [facility]: [facility]     SIGMOIDOSCOPY FLEXIBLE N/A 5/8/2018    Procedure: SIGMOIDOSCOPY FLEXIBLE;  flex sig with foreign body removal using snare and rattooth forcep;  Surgeon: Soham Cano MD;  Location: [facility]     SIGMOIDOSCOPY FLEXIBLE N/A 2/20/2019    Procedure: Exam under anesthesia Colonoscopy with attempted  removal of rectal foreign body;  Surgeon: Estrada Chávez MD;  Location: [facility]       Family History   Problem Relation Age of Onset     Diabetes Type 2  Maternal Grandmother      Diabetes Type 2  Paternal Grandmother      Breast Cancer Paternal Grandmother      Cerebrovascular Disease Father 53     Myocardial Infarction No family hx of      Coronary Artery Disease Early Onset No family hx of      Ovarian Cancer No family hx of      Colon Cancer No family hx of        Social History     Tobacco Use     Smoking status: Never Smoker     Smokeless tobacco: Never Used   Substance Use Topics     Alcohol use: No     Alcohol/week: 0.0 oz       Current Facility-Administered Medications   Medication     acetaminophen (TYLENOL) tablet 650 mg     albuterol (PROAIR HFA/PROVENTIL HFA/VENTOLIN HFA) 108 (90 Base) MCG/ACT inhaler 2 puff     cloNIDine (CATAPRES) half-tab 0.05 mg     [START ON 2/25/2019] desvenlafaxine (PRISTIQ) 24 hr tablet 100 mg     gabapentin (NEURONTIN) capsule 300 mg     lactated ringers infusion     lidocaine (LMX4) cream     lidocaine 1 % 1 mL     lurasidone (LATUDA) tablet 60 mg     naloxone (NARCAN) injection 0.1-0.4 mg     ondansetron (ZOFRAN-ODT) ODT tab 4 mg    Or     ondansetron (ZOFRAN) injection 4 mg     polyethylene glycol (GoLYTELY/NuLYTELY) suspension 150 mL     polyethylene  glycol (MIRALAX/GLYCOLAX) Packet 17 g     prochlorperazine (COMPAZINE) injection 10 mg    Or     prochlorperazine (COMPAZINE) tablet 10 mg    Or     prochlorperazine (COMPAZINE) Suppository 25 mg     sodium chloride (PF) 0.9% PF flush 3 mL     sodium chloride (PF) 0.9% PF flush 3 mL     [START ON 2/25/2019] vitamin D3 (CHOLECALCIFEROL) 1000 units (25 mcg) tablet 2,000 Units        Allergies   Allergen Reactions     Amoxicillin-Pot Clavulanate Other (See Comments), Rash and Swelling     PN: facial swelling, left side. Also had cortisone injection the same day as she started the Augmentin.  PN: facial swelling, left side. Also had cortisone injection the same day as she started the Augmentin.  Noted in downtime recovery of chart.       Penicillins Anaphylaxis     Blood-Group Specific Substance Other (See Comments)     Patient has an anti-Cw and non-specific antibodies. Blood product orders may be delayed. Draw one red top and two purple top tubes for all type/screen/crossmatch orders.     Hydrocodone Nausea and Vomiting     vomiting for days     Influenza Vaccines Other (See Comments)     Oseltamivir Hives     med stopped, PN: med stopped     Vicodin [Hydrocodone-Acetaminophen] Nausea and Vomiting     Cephalosporins Rash     Lamotrigine Rash     Possibly associated with Lamictal.      Latex Rash       I have reviewed the Medications, Allergies, Past Medical and Surgical History, and Social History in the Epic system.    Review of Systems   Gastrointestinal: Negative for abdominal pain.        Positive for foreign body in rectum  Positive for minor rectal pain  Negative for foreign body ingestion   Psychiatric/Behavioral: Negative for suicidal ideas.   All other systems reviewed and are negative.      Physical Exam   BP: (!) 157/94  Pulse: 103  Heart Rate: 102  Temp: 100.4  F (38  C)  Resp: 18  Weight: 115.2 kg (254 lb)  SpO2: 98 %      Physical Exam   General: awake, alert, NAD  Head: normal cephalic  HEENT: pupils  equal, conjugate gaze in tact  Neck: Supple  CV: regular rate and rhythm without murmur  Lungs: clear to ascultation  Abd: soft, non-tender, no guarding, no peritoneal signs  Rectal: No visible or palpable foreign body in the rectal vault.  At the very tip of my finger can palpate something if patient bears down.  EXT: lower extremities without swelling or edema  Neuro: awake, answers questions appropriately. No focal deficits noted  Psych: Flat affect.  Patient makes good eye contact.  Patient appears well groomed.  Patient denies suicidal ideation        ED Course   7:43 PM  The patient was seen and examined by Marco Antonio Hinojosa MD in Room ED03.        Procedures         Labs Ordered and Resulted from Time of ED Arrival Up to the Time of Departure from the ED - No data to display         Assessments & Plan (with Medical Decision Making)   Nevin is a 28-year-old female who presents for rectal foreign body.  Patient denies abdominal pain, x-ray confirmed foreign body with normal gas pattern and no free air.  I have very low suspicion for perforated viscus or complication at this time.  Discussed the case with colorectal staff, they are her family with patient as she was recently discharged from their service for the same foreign body.  She reported they were unsuccessful at retrieving it via sedation and colonoscopy and ultimately were able to flush it out after administering GoLYTELY so they would like to try the same this time around.  Plan is to admit to the colorectal service for further management and treatment.    Of note patient has had 20 visits in less than 60 days already this year for either ingestions of foreign objects versus rectal foreign bodies.  She has had numerous surgical procedures including EGDs, sedation, and colonoscopies.  Patient has a care plan with similar comments dating back to 2017.  Mental health assessment was obtained, please see their note for full details and collateral information.   "No recent discussion of commitment, though she has been committed in the past.  Previous notes patient endorses swallowing things like Alberto pins \"because it helps my anxiety\".  At this point I put the patient on 72-hour hold as I think she is high risk for self harm given the risk for morbidity and mortality from both the behaviors and also the medical attention necessary following the behaviors.  Patient has no insight into her behaviors and I think she requires psychiatric clearance prior to discharge.    I have reviewed the nursing notes.    I have reviewed the findings, diagnosis, plan and need for follow up with the patient.       Medication List      There are no discharge medications for this visit.         Final diagnoses:   Foreign body of rectum, subsequent encounter     I, Iggy Connolly, am serving as a trained medical scribe to document services personally performed by Marco Antonio Gonzalez MD, based on the provider's statements to me.      IMarco Antonio MD, was physically present and have reviewed and verified the accuracy of this note documented by Iggy Connolly.     2/24/2019   [facility], [place], EMERGENCY DEPARTMENT     Marco Antonio Gonzalez MD  02/24/19 [id number]    "

## 2019-02-25 NOTE — DISCHARGE SUMMARY
[facility]  Discharge Summary  Colon and Rectal Surgery     Nevin Alvarado MRN# [id number]   YOB: 1990 Age: 28 year old     Date of Admission:  2/24/2019  Date of Discharge::  2/25/2019  Admitting Physician:  Annmarie Haynes MD  Discharge Physician:    Primary Care Physician:        Latonya Knight          Admission Diagnoses:     Foreign body of rectum, subsequent encounter [T18.5XXD]          Discharge Diagnosis:     Foreign body of the rectum, which was passed after administration of GoLytle         Procedures:   None          Consultations:   VASCULAR ACCESS CARE ADULT IP CONSULT  PSYCHIATRY IP CONSULT  MEDICATION HISTORY IP PHARMACY CONSULT         Imaging Studies:     Results for orders placed or performed during the hospital encounter of 02/24/19   Abdomen XR, 2 vw, flat and upright    Narrative    XR ABDOMEN 2 VW   2/24/2019 8:12 PM      HISTORY:  forgein body in rectum, rule out other forgein bodies    COMPARISON: Film dated 2/20/2018    FINDINGS: The gas pattern is normal. No free air. There is a rounded  radiopaque foreign body projected over the upper pelvis. This measures  about 4 cm in diameter. This appears to represent the same foreign  body seen on the film of 2/20/2019. It is now slightly more distal in  the sigmoid colon. An IUD is also seen projected over the upper  pelvis.    PELON MELÉNDEZ MD              Medications Prior to Admission:     Medications Prior to Admission   Medication Sig Dispense Refill Last Dose     Cholecalciferol (VITAMIN D3 PO) Take 2,000 Units by mouth every morning    2/24/2019 at AM     CLONIDINE HCL PO Take 0.05 mg by mouth 2 times daily    2/24/2019 at AM     Desvenlafaxine Succinate (PRISTIQ PO) Take 100 mg by mouth every morning    2/24/2019 at AM     gabapentin (NEURONTIN) 100 MG capsule Take 300 mg by mouth 2 times daily   2/24/2019 at AM     lurasidone (LATUDA) 60 MG TABS tablet Take 60 mg by mouth At Bedtime    "2/23/2019 at PM     albuterol (PROAIR HFA) 108 (90 Base) MCG/ACT inhaler Inhale 2 puffs into the lungs 4 times daily as needed   More than a month at Unknown time              Discharge Medications:     Current Discharge Medication List      CONTINUE these medications which have NOT CHANGED    Details   Cholecalciferol (VITAMIN D3 PO) Take 2,000 Units by mouth every morning       CLONIDINE HCL PO Take 0.05 mg by mouth 2 times daily       Desvenlafaxine Succinate (PRISTIQ PO) Take 100 mg by mouth every morning       gabapentin (NEURONTIN) 100 MG capsule Take 300 mg by mouth 2 times daily      lurasidone (LATUDA) 60 MG TABS tablet Take 60 mg by mouth At Bedtime      albuterol (PROAIR HFA) 108 (90 Base) MCG/ACT inhaler Inhale 2 puffs into the lungs 4 times daily as needed                  Medications Discontinued or Adjusted During This Hospitalization:   No change           Antibiotics Prescribed at Discharge:   None prescribed            Brief History of Illness:     27 yo F with a significant psychiatric history including borderline personality disorder, chronic pain, depression, anxiety, suicide attempts, and recurrent ingestion/rectal insertion of foreign bodies who presented to the ED for evaluation of foreign body in rectum after masturbation. She has had numerous admissions in the past year with foreign body ingestions requiring endoscopy and colonoscopy for removal. Most recently, she was admitted to the colorectal surgery service one week ago with a rectally-inserted foreign body that was successfully removed with colonoscopy. Upon arrival she stated \"I just can't seem to learn\". She complained of minor rectal pain, denied abdominal pain.  She denied purposeful foreign body ingestion and denied suicidal ideation. Clinical exam, abdominal XR, and labs were reassuring. There was not high concern for colonic perforation. Of note, she was placed on a 72 hour hold after arrival in the emergency department.         "    Hospital Course:     She was admitted to the colorectal surgery service for management of rectal foreign body. She was managed conservatively with NPO and IVF. She was given GoLytle, which was successful and the patient did pass the foreign body (nail polish bottle) without complication. Her pain was controlled with Tylenol. She was advanced to a regular diet on hospital day 1. The patient remained afebrile and hemodynamically stable throughout her stay. Once the foreign body was passed, psychiatry was consulted. They saw the patient and recommended discharge to home with previously scheduled psychiatry appointment this week.           Day of Discharge Physical Exam:   Blood pressure 128/80, pulse 80, temperature 96.8  F (36  C), temperature source Oral, resp. rate 18, weight 115.2 kg (254 lb), SpO2 96 %, not currently breastfeeding.    Gen:      AAOx3, NAD  Pulm:    Non-labored breathing on room air   Abd:      Soft, ND, NT, no guarding or rebound tenderness.   Ext:       Warm and well-perfused         Final Pathology Result:   No pathology submitted         Discharge Instructions and Follow-Up:   Discharge diet: Regular   Discharge activity: Activity as tolerated   Discharge follow-up: As noted above    Tubes/Lines/Drains: None    Wound care: N/a       Discharge Procedure Orders   Reason for your hospital stay   Order Comments: Foreign body per rectum     Follow Up and recommended labs and tests   Order Comments: No need to follow up with colorectal surgery. Follow up with patient's psychiatry provider already scheduled.     Activity   Order Comments: Your activity upon discharge: Activity as tolerated     Order Specific Question Answer Comments   Is discharge order? Yes      Discharge Instructions   Order Comments: Diet:   -may resume normal diet     Pain control:   -PRN tylenol as needed. Do not exceed 4,000 mg per day.     Activity:   -As tolerated. It is in the patient's best interest to avoid ingestion of  foreign objects or placing them up her rectum as this could be very dangerous and even life threatening.     Follow up:   -No need to follow up with the surgical team. Follow up as planned with your psychiatry provider.     Full Code     Order Specific Question Answer Comments   Code status determined by: Unable to determine; FULL CODE until documents or legal decision maker available      Diet   Order Comments: Follow this diet upon discharge: Regular diet     Order Specific Question Answer Comments   Is discharge order? Yes             Home Health Care:   Not needed           Discharge Disposition:   Discharged to home       Condition at discharge: Stable      Dorothy Melchor MD   Surgery PGY-1       Staff Addendum:  Agree with the discharge summary as documented. I was personally involved with the discharge planning and hospital decision-making for this patient.  Annmarie Haynes MD  Colon and Rectal Surgery Staff  [facility]

## 2019-02-25 NOTE — PHARMACY-ADMISSION MEDICATION HISTORY
Admission medication history for the February 24, 2019 admission is complete.     Interview sources:  Patient    Reliability of source: Good    Medication compliance: Good    Preferred Outpatient Pharmacy: [facility]    Changes made to PTA medication list (reason)  Added: None  Deleted: Melatonin 3mg tablet; Take 1 tablet by mouth nightly as needed (no longer using per patient)  Changed: None    Additional medication history information:   None    Prior to Admission Medication List:  Prior to Admission medications    Medication Sig Last Dose Taking? Auth Provider   Cholecalciferol (VITAMIN D3 PO) Take 2,000 Units by mouth every morning  2/24/2019 at AM Yes Reported, Patient   CLONIDINE HCL PO Take 0.05 mg by mouth 2 times daily  2/24/2019 at AM Yes Reported, Patient   Desvenlafaxine Succinate (PRISTIQ PO) Take 100 mg by mouth every morning  2/24/2019 at AM Yes Reported, Patient   gabapentin (NEURONTIN) 100 MG capsule Take 300 mg by mouth 2 times daily 2/24/2019 at AM Yes Reported, Patient   lurasidone (LATUDA) 60 MG TABS tablet Take 60 mg by mouth At Bedtime 2/23/2019 at PM Yes Reported, Patient   albuterol (PROAIR HFA) 108 (90 Base) MCG/ACT inhaler Inhale 2 puffs into the lungs 4 times daily as needed More than a month at Unknown time  Reported, Patient       Time spent: 15 minutes    Medication history completed by:   Pablo De La Rosa, Pharmacy Intern

## 2019-02-25 NOTE — CONSULTS
Patient seen and chart reviewed. Note dictated (initial)   Admission to IP psychiatry would be counter therapeutic; may discontinue sitter and discharge to home. She has an with her OP MH provider tomorrow and has scheduled an intake at [facility] next Weds to start DBT  .page me at [phone] as needed

## 2019-02-26 ENCOUNTER — TELEPHONE (OUTPATIENT)
Dept: FAMILY MEDICINE | Facility: CLINIC | Age: 29
End: 2019-02-26

## 2019-02-26 NOTE — TELEPHONE ENCOUNTER
"\"I had a colonoscopy done (see epic)and tonight I am having abdominal pain(rates a 5/10) and yesterday I had a fever of 99.0, that's gone. \" denies other sx at this time. Gave home care advice and to be seen within 4 hrs.    Reason for Disposition    [1] MILD-MODERATE pain AND [2] constant AND [3] present > 2 hours    Additional Information    Negative: Shock suspected (e.g., cold/pale/clammy skin, too weak to stand, low BP, rapid pulse)    Negative: Difficult to awaken or acting confused  (e.g., disoriented, slurred speech)    Negative: Passed out (i.e., lost consciousness, collapsed and was not responding)    Negative: Sounds like a life-threatening emergency to the triager    Negative: Chest pain    Negative: Pain is mainly in upper abdomen  (if needed ask: \"is it mainly above the belly button?\")    Negative: Followed an abdomen (stomach) injury    Negative: [1] Abdominal pain AND [2] pregnant < 20 weeks    Negative: [1] Abdominal pain AND [2] pregnant > 20 weeks    Negative: [1] Abdominal pain AND [2] postpartum < 1 month since delivery    Negative: [1] SEVERE pain (e.g., excruciating) AND [2] present > 1 hour    Negative: [1] SEVERE pain AND [2] age > 60    Negative: [1] Vomiting AND [2] contains red blood or black (\"coffee ground\") material  (Exception: few red streaks in vomit that only happened once)    Negative: Blood in bowel movements   (Exception: blood on surface of BM with constipation)    Negative: Black or tarry bowel movements  (Exception: chronic-unchanged  black-grey bowel movements AND is taking iron pills or Pepto-bismol)    Negative: Patient sounds very sick or weak to the triager    Protocols used: ABDOMINAL PAIN - FEMALE-ADULT-      "

## 2019-02-26 NOTE — TELEPHONE ENCOUNTER
"Clinic Care Coordination Contact     Situation: Patient chart reviewed by care coordinator.      Background: MH pt is managed through [facility], has MH CM, Psychiatry, therapy in place. Pt only utilizes [facility]. PCP with [facility]. Pt has indicated and is documented in her chart she \"is not a [facility] Patient.\"       Assessment: Pt PCP is [facility]. Pt is Chronic/Persistantly MI      Plan/Recommendations: Not open to [facility]. Not a [facility] home pt. No outreaches will be made at this time from [facility] unless a new referral is made or a change in the pt's status occurs per pt request.         LEATHA Mayers  Care Coordinator  [phone]  [email or web address]  "

## 2019-08-17 ENCOUNTER — SURGERY - HEALTHEAST (OUTPATIENT)
Dept: GASTROENTEROLOGY | Facility: CLINIC | Age: 29
End: 2019-08-17

## 2019-08-23 ENCOUNTER — ANESTHESIA - HEALTHEAST (OUTPATIENT)
Dept: SURGERY | Facility: CLINIC | Age: 29
End: 2019-08-23

## 2019-08-23 ENCOUNTER — SURGERY - HEALTHEAST (OUTPATIENT)
Dept: SURGERY | Facility: CLINIC | Age: 29
End: 2019-08-23

## 2019-09-29 ENCOUNTER — SURGERY - HEALTHEAST (OUTPATIENT)
Dept: GASTROENTEROLOGY | Facility: CLINIC | Age: 29
End: 2019-09-29

## 2019-10-03 ENCOUNTER — ANESTHESIA - HEALTHEAST (OUTPATIENT)
Dept: SURGERY | Facility: CLINIC | Age: 29
End: 2019-10-03

## 2019-10-03 ENCOUNTER — SURGERY - HEALTHEAST (OUTPATIENT)
Dept: SURGERY | Facility: CLINIC | Age: 29
End: 2019-10-03

## 2019-10-03 ASSESSMENT — MIFFLIN-ST. JEOR: SCORE: 1893.89

## 2019-10-06 ENCOUNTER — HOSPITAL ENCOUNTER (INPATIENT)
Facility: CLINIC | Age: 29
LOS: 4 days | Discharge: HOME HEALTH | DRG: 909 | End: 2019-10-11
Attending: EMERGENCY MEDICINE | Admitting: THORACIC SURGERY (CARDIOTHORACIC VASCULAR SURGERY)
Payer: COMMERCIAL

## 2019-10-06 ENCOUNTER — ANESTHESIA (OUTPATIENT)
Dept: SURGERY | Facility: CLINIC | Age: 29
DRG: 909 | End: 2019-10-06
Payer: COMMERCIAL

## 2019-10-06 ENCOUNTER — SURGERY - HEALTHEAST (OUTPATIENT)
Dept: GASTROENTEROLOGY | Facility: CLINIC | Age: 29
End: 2019-10-06

## 2019-10-06 ENCOUNTER — ANESTHESIA EVENT (OUTPATIENT)
Dept: SURGERY | Facility: CLINIC | Age: 29
DRG: 909 | End: 2019-10-06
Payer: COMMERCIAL

## 2019-10-06 ENCOUNTER — APPOINTMENT (OUTPATIENT)
Dept: GENERAL RADIOLOGY | Facility: CLINIC | Age: 29
DRG: 909 | End: 2019-10-06
Attending: EMERGENCY MEDICINE
Payer: COMMERCIAL

## 2019-10-06 ENCOUNTER — SURGERY (OUTPATIENT)
Age: 29
End: 2019-10-06
Payer: COMMERCIAL

## 2019-10-06 DIAGNOSIS — K22.3 ESOPHAGEAL PERFORATION: ICD-10-CM

## 2019-10-06 DIAGNOSIS — T18.9XXA SWALLOWED FOREIGN BODY, INITIAL ENCOUNTER: Primary | ICD-10-CM

## 2019-10-06 DIAGNOSIS — T18.9XXA FOREIGN BODY, SWALLOWED, INITIAL ENCOUNTER: ICD-10-CM

## 2019-10-06 DIAGNOSIS — T18.108A FOREIGN BODY IN ESOPHAGUS, INITIAL ENCOUNTER: ICD-10-CM

## 2019-10-06 LAB
ALBUMIN SERPL-MCNC: 3.3 G/DL (ref 3.4–5)
ALP SERPL-CCNC: 74 U/L (ref 40–150)
ALT SERPL W P-5'-P-CCNC: 23 U/L (ref 0–50)
ANION GAP SERPL CALCULATED.3IONS-SCNC: 5 MMOL/L (ref 3–14)
APTT PPP: 31 SEC (ref 22–37)
AST SERPL W P-5'-P-CCNC: 16 U/L (ref 0–45)
BASOPHILS # BLD AUTO: 0.1 10E9/L (ref 0–0.2)
BASOPHILS NFR BLD AUTO: 0.6 %
BILIRUB SERPL-MCNC: 0.2 MG/DL (ref 0.2–1.3)
BUN SERPL-MCNC: 9 MG/DL (ref 7–30)
CALCIUM SERPL-MCNC: 8.5 MG/DL (ref 8.5–10.1)
CHLORIDE SERPL-SCNC: 110 MMOL/L (ref 94–109)
CO2 SERPL-SCNC: 24 MMOL/L (ref 20–32)
CREAT SERPL-MCNC: 0.6 MG/DL (ref 0.52–1.04)
DIFFERENTIAL METHOD BLD: NORMAL
EOSINOPHIL # BLD AUTO: 0.3 10E9/L (ref 0–0.7)
EOSINOPHIL NFR BLD AUTO: 3.2 %
ERYTHROCYTE [DISTWIDTH] IN BLOOD BY AUTOMATED COUNT: 14.3 % (ref 10–15)
GFR SERPL CREATININE-BSD FRML MDRD: >90 ML/MIN/{1.73_M2}
GLUCOSE SERPL-MCNC: 103 MG/DL (ref 70–99)
HCT VFR BLD AUTO: 37.8 % (ref 35–47)
HGB BLD-MCNC: 12.1 G/DL (ref 11.7–15.7)
IMM GRANULOCYTES # BLD: 0 10E9/L (ref 0–0.4)
IMM GRANULOCYTES NFR BLD: 0.1 %
INR PPP: 1.16 (ref 0.86–1.14)
LYMPHOCYTES # BLD AUTO: 1.9 10E9/L (ref 0.8–5.3)
LYMPHOCYTES NFR BLD AUTO: 23.2 %
MCH RBC QN AUTO: 28.6 PG (ref 26.5–33)
MCHC RBC AUTO-ENTMCNC: 32 G/DL (ref 31.5–36.5)
MCV RBC AUTO: 89 FL (ref 78–100)
MONOCYTES # BLD AUTO: 0.5 10E9/L (ref 0–1.3)
MONOCYTES NFR BLD AUTO: 6.2 %
NEUTROPHILS # BLD AUTO: 5.5 10E9/L (ref 1.6–8.3)
NEUTROPHILS NFR BLD AUTO: 66.7 %
NRBC # BLD AUTO: 0 10*3/UL
NRBC BLD AUTO-RTO: 0 /100
PLATELET # BLD AUTO: 231 10E9/L (ref 150–450)
POTASSIUM SERPL-SCNC: 3.4 MMOL/L (ref 3.4–5.3)
PROT SERPL-MCNC: 8 G/DL (ref 6.8–8.8)
RBC # BLD AUTO: 4.23 10E12/L (ref 3.8–5.2)
SODIUM SERPL-SCNC: 139 MMOL/L (ref 133–144)
WBC # BLD AUTO: 8.2 10E9/L (ref 4–11)

## 2019-10-06 PROCEDURE — 36000053 ZZH SURGERY LEVEL 2 EA 15 ADDTL MIN - UMMC: Performed by: THORACIC SURGERY (CARDIOTHORACIC VASCULAR SURGERY)

## 2019-10-06 PROCEDURE — 85610 PROTHROMBIN TIME: CPT | Performed by: EMERGENCY MEDICINE

## 2019-10-06 PROCEDURE — 71045 X-RAY EXAM CHEST 1 VIEW: CPT

## 2019-10-06 PROCEDURE — 71000017 ZZH RECOVERY PHASE 1 LEVEL 3 EA ADDTL HR: Performed by: THORACIC SURGERY (CARDIOTHORACIC VASCULAR SURGERY)

## 2019-10-06 PROCEDURE — 25000128 H RX IP 250 OP 636

## 2019-10-06 PROCEDURE — 80053 COMPREHEN METABOLIC PANEL: CPT | Performed by: EMERGENCY MEDICINE

## 2019-10-06 PROCEDURE — 85730 THROMBOPLASTIN TIME PARTIAL: CPT | Performed by: EMERGENCY MEDICINE

## 2019-10-06 PROCEDURE — 86902 BLOOD TYPE ANTIGEN DONOR EA: CPT | Performed by: EMERGENCY MEDICINE

## 2019-10-06 PROCEDURE — 86900 BLOOD TYPING SEROLOGIC ABO: CPT | Performed by: EMERGENCY MEDICINE

## 2019-10-06 PROCEDURE — 99285 EMERGENCY DEPT VISIT HI MDM: CPT | Mod: 25 | Performed by: EMERGENCY MEDICINE

## 2019-10-06 PROCEDURE — 25000128 H RX IP 250 OP 636: Performed by: NURSE ANESTHETIST, CERTIFIED REGISTERED

## 2019-10-06 PROCEDURE — 25000566 ZZH SEVOFLURANE, EA 15 MIN: Performed by: THORACIC SURGERY (CARDIOTHORACIC VASCULAR SURGERY)

## 2019-10-06 PROCEDURE — 74360 X-RAY GUIDE GI DILATION: CPT | Mod: 26 | Performed by: THORACIC SURGERY (CARDIOTHORACIC VASCULAR SURGERY)

## 2019-10-06 PROCEDURE — 36000051 ZZH SURGERY LEVEL 2 1ST 30 MIN - UMMC: Performed by: THORACIC SURGERY (CARDIOTHORACIC VASCULAR SURGERY)

## 2019-10-06 PROCEDURE — 85025 COMPLETE CBC W/AUTO DIFF WBC: CPT | Performed by: EMERGENCY MEDICINE

## 2019-10-06 PROCEDURE — 37000009 ZZH ANESTHESIA TECHNICAL FEE, EACH ADDTL 15 MIN: Performed by: THORACIC SURGERY (CARDIOTHORACIC VASCULAR SURGERY)

## 2019-10-06 PROCEDURE — 96374 THER/PROPH/DIAG INJ IV PUSH: CPT | Performed by: EMERGENCY MEDICINE

## 2019-10-06 PROCEDURE — 43212 ESOPHAGOSCOP STENT PLACEMENT: CPT | Mod: GC | Performed by: THORACIC SURGERY (CARDIOTHORACIC VASCULAR SURGERY)

## 2019-10-06 PROCEDURE — 71000016 ZZH RECOVERY PHASE 1 LEVEL 3 FIRST HR: Performed by: THORACIC SURGERY (CARDIOTHORACIC VASCULAR SURGERY)

## 2019-10-06 PROCEDURE — C1874 STENT, COATED/COV W/DEL SYS: HCPCS | Performed by: THORACIC SURGERY (CARDIOTHORACIC VASCULAR SURGERY)

## 2019-10-06 PROCEDURE — C1769 GUIDE WIRE: HCPCS | Performed by: THORACIC SURGERY (CARDIOTHORACIC VASCULAR SURGERY)

## 2019-10-06 PROCEDURE — 86901 BLOOD TYPING SEROLOGIC RH(D): CPT | Performed by: EMERGENCY MEDICINE

## 2019-10-06 PROCEDURE — 37000008 ZZH ANESTHESIA TECHNICAL FEE, 1ST 30 MIN: Performed by: THORACIC SURGERY (CARDIOTHORACIC VASCULAR SURGERY)

## 2019-10-06 PROCEDURE — 86850 RBC ANTIBODY SCREEN: CPT | Performed by: EMERGENCY MEDICINE

## 2019-10-06 PROCEDURE — 27210794 ZZH OR GENERAL SUPPLY STERILE: Performed by: THORACIC SURGERY (CARDIOTHORACIC VASCULAR SURGERY)

## 2019-10-06 PROCEDURE — 25800030 ZZH RX IP 258 OP 636: Performed by: NURSE ANESTHETIST, CERTIFIED REGISTERED

## 2019-10-06 RX ORDER — MORPHINE SULFATE 4 MG/ML
4 INJECTION, SOLUTION INTRAMUSCULAR; INTRAVENOUS
Status: DISCONTINUED | OUTPATIENT
Start: 2019-10-06 | End: 2019-10-07

## 2019-10-06 RX ORDER — MORPHINE SULFATE 2 MG/ML
INJECTION, SOLUTION INTRAMUSCULAR; INTRAVENOUS
Status: COMPLETED
Start: 2019-10-06 | End: 2019-10-06

## 2019-10-06 RX ADMIN — MORPHINE SULFATE 4 MG: 2 INJECTION, SOLUTION INTRAMUSCULAR; INTRAVENOUS at 23:25

## 2019-10-06 RX ADMIN — SODIUM CHLORIDE, POTASSIUM CHLORIDE, SODIUM LACTATE AND CALCIUM CHLORIDE: 600; 310; 30; 20 INJECTION, SOLUTION INTRAVENOUS at 23:42

## 2019-10-06 RX ADMIN — MIDAZOLAM 2 MG: 1 INJECTION INTRAMUSCULAR; INTRAVENOUS at 23:53

## 2019-10-06 ASSESSMENT — MIFFLIN-ST. JEOR
SCORE: 1893.89
SCORE: 1903.89

## 2019-10-06 ASSESSMENT — ENCOUNTER SYMPTOMS: ABDOMINAL PAIN: 1

## 2019-10-07 ENCOUNTER — APPOINTMENT (OUTPATIENT)
Dept: GENERAL RADIOLOGY | Facility: CLINIC | Age: 29
DRG: 909 | End: 2019-10-07
Payer: COMMERCIAL

## 2019-10-07 ENCOUNTER — APPOINTMENT (OUTPATIENT)
Dept: GENERAL RADIOLOGY | Facility: CLINIC | Age: 29
DRG: 909 | End: 2019-10-07
Attending: THORACIC SURGERY (CARDIOTHORACIC VASCULAR SURGERY)
Payer: COMMERCIAL

## 2019-10-07 LAB
ABO + RH BLD: ABNORMAL
ABO + RH BLD: ABNORMAL
ANION GAP SERPL CALCULATED.3IONS-SCNC: 9 MMOL/L (ref 3–14)
BLD GP AB SCN SERPL QL: ABNORMAL
BLD PROD TYP BPU: ABNORMAL
BLOOD BANK CMNT PATIENT-IMP: ABNORMAL
BLOOD BANK CMNT PATIENT-IMP: ABNORMAL
BUN SERPL-MCNC: 10 MG/DL (ref 7–30)
CALCIUM SERPL-MCNC: 8.8 MG/DL (ref 8.5–10.1)
CHLORIDE SERPL-SCNC: 108 MMOL/L (ref 94–109)
CO2 SERPL-SCNC: 20 MMOL/L (ref 20–32)
CREAT SERPL-MCNC: 0.64 MG/DL (ref 0.52–1.04)
ERYTHROCYTE [DISTWIDTH] IN BLOOD BY AUTOMATED COUNT: 14.5 % (ref 10–15)
GFR SERPL CREATININE-BSD FRML MDRD: >90 ML/MIN/{1.73_M2}
GLUCOSE SERPL-MCNC: 162 MG/DL (ref 70–99)
HCT VFR BLD AUTO: 40.3 % (ref 35–47)
HGB BLD-MCNC: 12.7 G/DL (ref 11.7–15.7)
LACTATE BLD-SCNC: 2 MMOL/L (ref 0.7–2)
MCH RBC QN AUTO: 28.7 PG (ref 26.5–33)
MCHC RBC AUTO-ENTMCNC: 31.5 G/DL (ref 31.5–36.5)
MCV RBC AUTO: 91 FL (ref 78–100)
NUM BPU REQUESTED: 2
PLATELET # BLD AUTO: 218 10E9/L (ref 150–450)
POTASSIUM SERPL-SCNC: 4 MMOL/L (ref 3.4–5.3)
RBC # BLD AUTO: 4.42 10E12/L (ref 3.8–5.2)
SODIUM SERPL-SCNC: 137 MMOL/L (ref 133–144)
SPECIMEN EXP DATE BLD: ABNORMAL
UPPER GI ENDOSCOPY: NORMAL
WBC # BLD AUTO: 21.2 10E9/L (ref 4–11)

## 2019-10-07 PROCEDURE — 25500064 ZZH RX 255 OP 636: Performed by: THORACIC SURGERY (CARDIOTHORACIC VASCULAR SURGERY)

## 2019-10-07 PROCEDURE — 85027 COMPLETE CBC AUTOMATED: CPT | Performed by: STUDENT IN AN ORGANIZED HEALTH CARE EDUCATION/TRAINING PROGRAM

## 2019-10-07 PROCEDURE — 40000278 XR SURGERY CARM FLUORO LESS THAN 5 MIN

## 2019-10-07 PROCEDURE — 12000001 ZZH R&B MED SURG/OB UMMC

## 2019-10-07 PROCEDURE — 25000128 H RX IP 250 OP 636: Performed by: SURGERY

## 2019-10-07 PROCEDURE — 25000125 ZZHC RX 250: Performed by: NURSE ANESTHETIST, CERTIFIED REGISTERED

## 2019-10-07 PROCEDURE — 25000128 H RX IP 250 OP 636: Performed by: NURSE ANESTHETIST, CERTIFIED REGISTERED

## 2019-10-07 PROCEDURE — 25000128 H RX IP 250 OP 636: Performed by: ANESTHESIOLOGY

## 2019-10-07 PROCEDURE — 25000132 ZZH RX MED GY IP 250 OP 250 PS 637: Performed by: SURGERY

## 2019-10-07 PROCEDURE — 99221 1ST HOSP IP/OBS SF/LOW 40: CPT | Performed by: NURSE PRACTITIONER

## 2019-10-07 PROCEDURE — 99232 SBSQ HOSP IP/OBS MODERATE 35: CPT | Performed by: THORACIC SURGERY (CARDIOTHORACIC VASCULAR SURGERY)

## 2019-10-07 PROCEDURE — 40000986 XR CHEST PORT 1 VW

## 2019-10-07 PROCEDURE — 80048 BASIC METABOLIC PNL TOTAL CA: CPT | Performed by: STUDENT IN AN ORGANIZED HEALTH CARE EDUCATION/TRAINING PROGRAM

## 2019-10-07 PROCEDURE — 25000128 H RX IP 250 OP 636: Performed by: STUDENT IN AN ORGANIZED HEALTH CARE EDUCATION/TRAINING PROGRAM

## 2019-10-07 PROCEDURE — 36415 COLL VENOUS BLD VENIPUNCTURE: CPT | Performed by: STUDENT IN AN ORGANIZED HEALTH CARE EDUCATION/TRAINING PROGRAM

## 2019-10-07 PROCEDURE — 25800030 ZZH RX IP 258 OP 636: Performed by: STUDENT IN AN ORGANIZED HEALTH CARE EDUCATION/TRAINING PROGRAM

## 2019-10-07 PROCEDURE — C9113 INJ PANTOPRAZOLE SODIUM, VIA: HCPCS | Performed by: SURGERY

## 2019-10-07 PROCEDURE — 83605 ASSAY OF LACTIC ACID: CPT

## 2019-10-07 PROCEDURE — 25000125 ZZHC RX 250: Performed by: STUDENT IN AN ORGANIZED HEALTH CARE EDUCATION/TRAINING PROGRAM

## 2019-10-07 PROCEDURE — 0D758DZ DILATION OF ESOPHAGUS WITH INTRALUMINAL DEVICE, VIA NATURAL OR ARTIFICIAL OPENING ENDOSCOPIC: ICD-10-PCS | Performed by: THORACIC SURGERY (CARDIOTHORACIC VASCULAR SURGERY)

## 2019-10-07 PROCEDURE — 0DC38ZZ EXTIRPATION OF MATTER FROM LOWER ESOPHAGUS, VIA NATURAL OR ARTIFICIAL OPENING ENDOSCOPIC: ICD-10-PCS | Performed by: INTERNAL MEDICINE

## 2019-10-07 DEVICE — STENT ESOPHAGEAL ENDOMAXX 23X120MM MAXX-2312: Type: IMPLANTABLE DEVICE | Site: ESOPHAGUS | Status: FUNCTIONAL

## 2019-10-07 RX ORDER — GLYCOPYRROLATE 0.2 MG/ML
INJECTION, SOLUTION INTRAMUSCULAR; INTRAVENOUS PRN
Status: DISCONTINUED | OUTPATIENT
Start: 2019-10-07 | End: 2019-10-07

## 2019-10-07 RX ORDER — DIPHENHYDRAMINE HYDROCHLORIDE 50 MG/ML
25 INJECTION INTRAMUSCULAR; INTRAVENOUS ONCE
Status: COMPLETED | OUTPATIENT
Start: 2019-10-07 | End: 2019-10-07

## 2019-10-07 RX ORDER — OXYCODONE HCL 5 MG/5 ML
5 SOLUTION, ORAL ORAL EVERY 4 HOURS
Status: DISCONTINUED | OUTPATIENT
Start: 2019-10-07 | End: 2019-10-11 | Stop reason: HOSPADM

## 2019-10-07 RX ORDER — DIPHENHYDRAMINE HYDROCHLORIDE 50 MG/ML
INJECTION INTRAMUSCULAR; INTRAVENOUS PRN
Status: DISCONTINUED | OUTPATIENT
Start: 2019-10-07 | End: 2019-10-07

## 2019-10-07 RX ORDER — TOPIRAMATE 100 MG/1
50 TABLET, FILM COATED ORAL
Status: ON HOLD | COMMUNITY
End: 2020-08-19

## 2019-10-07 RX ORDER — BUSPIRONE HYDROCHLORIDE 10 MG/1
TABLET ORAL
Status: ON HOLD | COMMUNITY
End: 2020-08-19

## 2019-10-07 RX ORDER — HYDROMORPHONE HCL/0.9% NACL/PF 0.2MG/0.2
0.2 SYRINGE (ML) INTRAVENOUS
Status: DISCONTINUED | OUTPATIENT
Start: 2019-10-07 | End: 2019-10-07

## 2019-10-07 RX ORDER — LIDOCAINE 40 MG/G
CREAM TOPICAL
Status: DISCONTINUED | OUTPATIENT
Start: 2019-10-07 | End: 2019-10-11 | Stop reason: HOSPADM

## 2019-10-07 RX ORDER — CLINDAMYCIN PHOSPHATE 600 MG/50ML
600 INJECTION, SOLUTION INTRAVENOUS EVERY 8 HOURS
Status: DISCONTINUED | OUTPATIENT
Start: 2019-10-07 | End: 2019-10-10

## 2019-10-07 RX ORDER — LIDOCAINE HYDROCHLORIDE 20 MG/ML
INJECTION, SOLUTION INFILTRATION; PERINEURAL PRN
Status: DISCONTINUED | OUTPATIENT
Start: 2019-10-07 | End: 2019-10-07

## 2019-10-07 RX ORDER — PROPOFOL 10 MG/ML
INJECTION, EMULSION INTRAVENOUS PRN
Status: DISCONTINUED | OUTPATIENT
Start: 2019-10-07 | End: 2019-10-07

## 2019-10-07 RX ORDER — ACETAMINOPHEN 500 MG
TABLET ORAL
Status: ON HOLD | COMMUNITY
End: 2019-11-05

## 2019-10-07 RX ORDER — ONDANSETRON 2 MG/ML
INJECTION INTRAMUSCULAR; INTRAVENOUS PRN
Status: DISCONTINUED | OUTPATIENT
Start: 2019-10-07 | End: 2019-10-07

## 2019-10-07 RX ORDER — FENTANYL CITRATE 50 UG/ML
25-50 INJECTION, SOLUTION INTRAMUSCULAR; INTRAVENOUS EVERY 5 MIN PRN
Status: DISCONTINUED | OUTPATIENT
Start: 2019-10-07 | End: 2019-10-07 | Stop reason: HOSPADM

## 2019-10-07 RX ORDER — HYDROMORPHONE HCL/0.9% NACL/PF 0.2MG/0.2
0.2 SYRINGE (ML) INTRAVENOUS ONCE
Status: COMPLETED | OUTPATIENT
Start: 2019-10-07 | End: 2019-10-07

## 2019-10-07 RX ORDER — FENTANYL CITRATE 50 UG/ML
INJECTION, SOLUTION INTRAMUSCULAR; INTRAVENOUS PRN
Status: DISCONTINUED | OUTPATIENT
Start: 2019-10-07 | End: 2019-10-07

## 2019-10-07 RX ORDER — NALOXONE HYDROCHLORIDE 0.4 MG/ML
.1-.4 INJECTION, SOLUTION INTRAMUSCULAR; INTRAVENOUS; SUBCUTANEOUS
Status: DISCONTINUED | OUTPATIENT
Start: 2019-10-07 | End: 2019-10-11 | Stop reason: HOSPADM

## 2019-10-07 RX ORDER — FLUCONAZOLE 2 MG/ML
400 INJECTION, SOLUTION INTRAVENOUS EVERY 24 HOURS
Status: DISCONTINUED | OUTPATIENT
Start: 2019-10-07 | End: 2019-10-10

## 2019-10-07 RX ORDER — MEPERIDINE HYDROCHLORIDE 25 MG/ML
12.5 INJECTION INTRAMUSCULAR; INTRAVENOUS; SUBCUTANEOUS
Status: DISCONTINUED | OUTPATIENT
Start: 2019-10-07 | End: 2019-10-07 | Stop reason: HOSPADM

## 2019-10-07 RX ORDER — ONDANSETRON 4 MG/1
4 TABLET, ORALLY DISINTEGRATING ORAL EVERY 30 MIN PRN
Status: DISCONTINUED | OUTPATIENT
Start: 2019-10-07 | End: 2019-10-07 | Stop reason: HOSPADM

## 2019-10-07 RX ORDER — SODIUM CHLORIDE, SODIUM LACTATE, POTASSIUM CHLORIDE, CALCIUM CHLORIDE 600; 310; 30; 20 MG/100ML; MG/100ML; MG/100ML; MG/100ML
INJECTION, SOLUTION INTRAVENOUS CONTINUOUS
Status: DISCONTINUED | OUTPATIENT
Start: 2019-10-07 | End: 2019-10-10

## 2019-10-07 RX ORDER — SODIUM CHLORIDE, SODIUM LACTATE, POTASSIUM CHLORIDE, CALCIUM CHLORIDE 600; 310; 30; 20 MG/100ML; MG/100ML; MG/100ML; MG/100ML
INJECTION, SOLUTION INTRAVENOUS CONTINUOUS PRN
Status: DISCONTINUED | OUTPATIENT
Start: 2019-10-06 | End: 2019-10-07

## 2019-10-07 RX ORDER — GABAPENTIN 300 MG/1
300 CAPSULE ORAL 2 TIMES DAILY
Status: DISCONTINUED | OUTPATIENT
Start: 2019-10-07 | End: 2019-10-09

## 2019-10-07 RX ORDER — ONDANSETRON 2 MG/ML
4 INJECTION INTRAMUSCULAR; INTRAVENOUS EVERY 6 HOURS PRN
Status: DISCONTINUED | OUTPATIENT
Start: 2019-10-07 | End: 2019-10-09

## 2019-10-07 RX ORDER — LURASIDONE HYDROCHLORIDE 20 MG/1
60 TABLET, FILM COATED ORAL AT BEDTIME
Status: DISCONTINUED | OUTPATIENT
Start: 2019-10-07 | End: 2019-10-11 | Stop reason: HOSPADM

## 2019-10-07 RX ORDER — MORPHINE SULFATE 2 MG/ML
2 INJECTION, SOLUTION INTRAMUSCULAR; INTRAVENOUS
Status: DISCONTINUED | OUTPATIENT
Start: 2019-10-07 | End: 2019-10-10

## 2019-10-07 RX ORDER — SODIUM CHLORIDE, SODIUM LACTATE, POTASSIUM CHLORIDE, CALCIUM CHLORIDE 600; 310; 30; 20 MG/100ML; MG/100ML; MG/100ML; MG/100ML
INJECTION, SOLUTION INTRAVENOUS CONTINUOUS
Status: DISCONTINUED | OUTPATIENT
Start: 2019-10-07 | End: 2019-10-07 | Stop reason: HOSPADM

## 2019-10-07 RX ORDER — CYCLOBENZAPRINE HCL 10 MG
TABLET ORAL
Status: ON HOLD | COMMUNITY
End: 2019-10-09

## 2019-10-07 RX ORDER — KETOROLAC TROMETHAMINE 15 MG/ML
15 INJECTION, SOLUTION INTRAMUSCULAR; INTRAVENOUS EVERY 6 HOURS
Status: DISCONTINUED | OUTPATIENT
Start: 2019-10-07 | End: 2019-10-10

## 2019-10-07 RX ORDER — METFORMIN HCL 500 MG
1000 TABLET, EXTENDED RELEASE 24 HR ORAL
Status: ON HOLD | COMMUNITY
End: 2022-02-16

## 2019-10-07 RX ORDER — DIPHENHYDRAMINE HCL 25 MG
CAPSULE ORAL
COMMUNITY
End: 2020-01-18

## 2019-10-07 RX ORDER — HYDROMORPHONE HYDROCHLORIDE 1 MG/ML
.3-.5 INJECTION, SOLUTION INTRAMUSCULAR; INTRAVENOUS; SUBCUTANEOUS EVERY 10 MIN PRN
Status: DISCONTINUED | OUTPATIENT
Start: 2019-10-07 | End: 2019-10-07 | Stop reason: HOSPADM

## 2019-10-07 RX ORDER — ONDANSETRON 4 MG/1
4 TABLET, ORALLY DISINTEGRATING ORAL EVERY 6 HOURS PRN
Status: DISCONTINUED | OUTPATIENT
Start: 2019-10-07 | End: 2019-10-09

## 2019-10-07 RX ORDER — NALOXONE HYDROCHLORIDE 0.4 MG/ML
.1-.4 INJECTION, SOLUTION INTRAMUSCULAR; INTRAVENOUS; SUBCUTANEOUS
Status: DISCONTINUED | OUTPATIENT
Start: 2019-10-07 | End: 2019-10-07 | Stop reason: HOSPADM

## 2019-10-07 RX ORDER — ONDANSETRON 2 MG/ML
4 INJECTION INTRAMUSCULAR; INTRAVENOUS EVERY 30 MIN PRN
Status: DISCONTINUED | OUTPATIENT
Start: 2019-10-07 | End: 2019-10-07 | Stop reason: HOSPADM

## 2019-10-07 RX ORDER — DESVENLAFAXINE 50 MG/1
100 TABLET, FILM COATED, EXTENDED RELEASE ORAL EVERY MORNING
Status: DISCONTINUED | OUTPATIENT
Start: 2019-10-07 | End: 2019-10-11 | Stop reason: HOSPADM

## 2019-10-07 RX ORDER — DOCUSATE SODIUM 100 MG/1
CAPSULE, LIQUID FILLED ORAL
Status: ON HOLD | COMMUNITY
End: 2019-11-04

## 2019-10-07 RX ORDER — MULTIVIT-MIN/IRON/FOLIC ACID/K 18-600-40
2000 CAPSULE ORAL DAILY
Status: ON HOLD | COMMUNITY
End: 2022-02-16

## 2019-10-07 RX ORDER — MORPHINE SULFATE 2 MG/ML
1-2 INJECTION, SOLUTION INTRAMUSCULAR; INTRAVENOUS
Status: DISCONTINUED | OUTPATIENT
Start: 2019-10-07 | End: 2019-10-07

## 2019-10-07 RX ORDER — CLONIDINE HYDROCHLORIDE 0.1 MG/1
0.05 TABLET ORAL 2 TIMES DAILY
Status: DISCONTINUED | OUTPATIENT
Start: 2019-10-07 | End: 2019-10-09

## 2019-10-07 RX ORDER — DEXAMETHASONE SODIUM PHOSPHATE 4 MG/ML
INJECTION, SOLUTION INTRA-ARTICULAR; INTRALESIONAL; INTRAMUSCULAR; INTRAVENOUS; SOFT TISSUE PRN
Status: DISCONTINUED | OUTPATIENT
Start: 2019-10-07 | End: 2019-10-07

## 2019-10-07 RX ORDER — MORPHINE SULFATE 2 MG/ML
1 INJECTION, SOLUTION INTRAMUSCULAR; INTRAVENOUS
Status: DISCONTINUED | OUTPATIENT
Start: 2019-10-07 | End: 2019-10-07

## 2019-10-07 RX ADMIN — OXYCODONE HYDROCHLORIDE 5 MG: 5 SOLUTION ORAL at 20:06

## 2019-10-07 RX ADMIN — ONDANSETRON HYDROCHLORIDE 4 MG: 2 INJECTION, SOLUTION INTRAMUSCULAR; INTRAVENOUS at 09:59

## 2019-10-07 RX ADMIN — CLINDAMYCIN IN 5 PERCENT DEXTROSE 600 MG: 12 INJECTION, SOLUTION INTRAVENOUS at 12:00

## 2019-10-07 RX ADMIN — MORPHINE SULFATE 2 MG: 2 INJECTION, SOLUTION INTRAMUSCULAR; INTRAVENOUS at 21:26

## 2019-10-07 RX ADMIN — MORPHINE SULFATE 2 MG: 2 INJECTION, SOLUTION INTRAMUSCULAR; INTRAVENOUS at 10:52

## 2019-10-07 RX ADMIN — DIPHENHYDRAMINE HYDROCHLORIDE 25 MG: 50 INJECTION, SOLUTION INTRAMUSCULAR; INTRAVENOUS at 00:25

## 2019-10-07 RX ADMIN — Medication 0.2 MG: at 05:35

## 2019-10-07 RX ADMIN — OXYCODONE HYDROCHLORIDE 5 MG: 5 SOLUTION ORAL at 12:00

## 2019-10-07 RX ADMIN — Medication 0.2 MG: at 04:21

## 2019-10-07 RX ADMIN — METRONIDAZOLE 500 MG: 500 INJECTION, SOLUTION INTRAVENOUS at 10:55

## 2019-10-07 RX ADMIN — MORPHINE SULFATE 2 MG: 2 INJECTION, SOLUTION INTRAMUSCULAR; INTRAVENOUS at 19:06

## 2019-10-07 RX ADMIN — HYDROMORPHONE HYDROCHLORIDE 0.2 MG: 1 INJECTION, SOLUTION INTRAMUSCULAR; INTRAVENOUS; SUBCUTANEOUS at 03:09

## 2019-10-07 RX ADMIN — METRONIDAZOLE 500 MG: 500 INJECTION, SOLUTION INTRAVENOUS at 04:20

## 2019-10-07 RX ADMIN — HYDROMORPHONE HYDROCHLORIDE 0.3 MG: 1 INJECTION, SOLUTION INTRAMUSCULAR; INTRAVENOUS; SUBCUTANEOUS at 02:46

## 2019-10-07 RX ADMIN — SUGAMMADEX 200 MG: 100 INJECTION, SOLUTION INTRAVENOUS at 02:04

## 2019-10-07 RX ADMIN — MORPHINE SULFATE 2 MG: 2 INJECTION, SOLUTION INTRAMUSCULAR; INTRAVENOUS at 07:57

## 2019-10-07 RX ADMIN — PROPOFOL 200 MG: 10 INJECTION, EMULSION INTRAVENOUS at 00:02

## 2019-10-07 RX ADMIN — DEXAMETHASONE SODIUM PHOSPHATE 10 MG: 4 INJECTION, SOLUTION INTRA-ARTICULAR; INTRALESIONAL; INTRAMUSCULAR; INTRAVENOUS; SOFT TISSUE at 00:15

## 2019-10-07 RX ADMIN — Medication 140 MG: at 00:02

## 2019-10-07 RX ADMIN — ONDANSETRON 4 MG: 2 INJECTION INTRAMUSCULAR; INTRAVENOUS at 00:15

## 2019-10-07 RX ADMIN — CLINDAMYCIN IN 5 PERCENT DEXTROSE 600 MG: 12 INJECTION, SOLUTION INTRAVENOUS at 05:33

## 2019-10-07 RX ADMIN — ROCURONIUM BROMIDE 20 MG: 10 INJECTION INTRAVENOUS at 00:38

## 2019-10-07 RX ADMIN — GLYCOPYRROLATE 0.2 MG: 0.2 INJECTION, SOLUTION INTRAMUSCULAR; INTRAVENOUS at 00:33

## 2019-10-07 RX ADMIN — MORPHINE SULFATE 2 MG: 2 INJECTION, SOLUTION INTRAMUSCULAR; INTRAVENOUS at 23:55

## 2019-10-07 RX ADMIN — SODIUM CHLORIDE, POTASSIUM CHLORIDE, SODIUM LACTATE AND CALCIUM CHLORIDE: 600; 310; 30; 20 INJECTION, SOLUTION INTRAVENOUS at 16:23

## 2019-10-07 RX ADMIN — CLINDAMYCIN IN 5 PERCENT DEXTROSE 600 MG: 12 INJECTION, SOLUTION INTRAVENOUS at 20:32

## 2019-10-07 RX ADMIN — OXYCODONE HYDROCHLORIDE 5 MG: 5 SOLUTION ORAL at 16:06

## 2019-10-07 RX ADMIN — DIPHENHYDRAMINE HYDROCHLORIDE 25 MG: 50 INJECTION, SOLUTION INTRAMUSCULAR; INTRAVENOUS at 18:18

## 2019-10-07 RX ADMIN — KETOROLAC TROMETHAMINE 15 MG: 15 INJECTION, SOLUTION INTRAMUSCULAR; INTRAVENOUS at 11:57

## 2019-10-07 RX ADMIN — KETOROLAC TROMETHAMINE 15 MG: 15 INJECTION, SOLUTION INTRAMUSCULAR; INTRAVENOUS at 17:51

## 2019-10-07 RX ADMIN — FLUCONAZOLE IN SODIUM CHLORIDE 400 MG: 2 INJECTION, SOLUTION INTRAVENOUS at 08:46

## 2019-10-07 RX ADMIN — ROCURONIUM BROMIDE 30 MG: 10 INJECTION INTRAVENOUS at 00:10

## 2019-10-07 RX ADMIN — FENTANYL CITRATE 100 MCG: 50 INJECTION, SOLUTION INTRAMUSCULAR; INTRAVENOUS at 00:37

## 2019-10-07 RX ADMIN — OXYCODONE HYDROCHLORIDE 5 MG: 5 SOLUTION ORAL at 23:55

## 2019-10-07 RX ADMIN — PANTOPRAZOLE SODIUM 40 MG: 40 INJECTION, POWDER, FOR SOLUTION INTRAVENOUS at 12:06

## 2019-10-07 RX ADMIN — IOTHALAMATE MEGLUMINE 45 ML: 600 INJECTION INTRAVASCULAR at 02:04

## 2019-10-07 RX ADMIN — FENTANYL CITRATE 100 MCG: 50 INJECTION, SOLUTION INTRAMUSCULAR; INTRAVENOUS at 00:02

## 2019-10-07 RX ADMIN — MORPHINE SULFATE 2 MG: 2 INJECTION, SOLUTION INTRAMUSCULAR; INTRAVENOUS at 13:35

## 2019-10-07 RX ADMIN — LIDOCAINE HYDROCHLORIDE 100 MG: 20 INJECTION, SOLUTION INFILTRATION; PERINEURAL at 00:02

## 2019-10-07 RX ADMIN — KETOROLAC TROMETHAMINE 15 MG: 15 INJECTION, SOLUTION INTRAMUSCULAR; INTRAVENOUS at 23:55

## 2019-10-07 RX ADMIN — ROCURONIUM BROMIDE 20 MG: 10 INJECTION INTRAVENOUS at 01:27

## 2019-10-07 RX ADMIN — MORPHINE SULFATE 2 MG: 2 INJECTION, SOLUTION INTRAMUSCULAR; INTRAVENOUS at 17:13

## 2019-10-07 ASSESSMENT — ACTIVITIES OF DAILY LIVING (ADL)
RETIRED_COMMUNICATION: 0-->UNDERSTANDS/COMMUNICATES WITHOUT DIFFICULTY
ADLS_ACUITY_SCORE: 11
TOILETING: 0-->INDEPENDENT
TRANSFERRING: 0-->INDEPENDENT
DRESS: 0-->INDEPENDENT
FALL_HISTORY_WITHIN_LAST_SIX_MONTHS: NO
RETIRED_EATING: 0-->INDEPENDENT
ADLS_ACUITY_SCORE: 10
ADLS_ACUITY_SCORE: 10
COGNITION: 0 - NO COGNITION ISSUES REPORTED
BATHING: 0-->INDEPENDENT
AMBULATION: 0-->INDEPENDENT
ADLS_ACUITY_SCORE: 10
ADLS_ACUITY_SCORE: 10
SWALLOWING: 0-->SWALLOWS FOODS/LIQUIDS WITHOUT DIFFICULTY

## 2019-10-07 ASSESSMENT — PAIN DESCRIPTION - DESCRIPTORS: DESCRIPTORS: CONSTANT;SHARP

## 2019-10-07 NOTE — BRIEF OP NOTE
[facility], [place]    Brief Operative Note    Pre-operative diagnosis: Esophageal foreign body  Post-operative diagnosis Esophageal foreign body  Procedure: Procedure(s):  ESOPHAGOGASTRODUODENOSCOPY (EGD) with foreign body removal x2, esophageal stent placement with floroscopy  Surgeon: Surgeon(s) and Role:     * Timoteo Espana MD - Primary     * Karina Casanova MD - Assisting     * Campbell Rogers MD - Assisting  Anesthesia: General   Estimated blood loss: Minimal  Drains: None  Specimens: * No specimens in log *  Findings:   Muscular tear 26-32 cm; full thickness perforation 32-34 cm.  23 x 120 mm fully covered esophageal stent placed at 24 cm with no extravasation after placement.  Clips placed to temporarily secure stent.  Complications: None.  Implants:    Implant Name Type Inv. Item Serial No.  Lot No. LRB No. Used   STENT ESOPHAGEAL ENDOMAXX [id number] Stent STENT ESOPHAGEAL ENDOMAXX [id number] NA [facility] CMOS Sensors [facility] [id number] N/A 1

## 2019-10-07 NOTE — ANESTHESIA CARE TRANSFER NOTE
Patient: Nevin Alvarado    Procedure(s):  ESOPHAGOGASTRODUODENOSCOPY (EGD) with fireign body removal x2, esophageal stent placement with floroscopy    Diagnosis: * No pre-op diagnosis entered *  Diagnosis Additional Information: No value filed.    Anesthesia Type:   General     Note:  Airway :Face Mask  Patient transferred to:PACU  Comments: Pt denies pain or nausea. Report given to RN. Handoff Report: Identifed the Patient, Identified the Reponsible Provider, Reviewed the pertinent medical history, Discussed the surgical course, Reviewed Intra-OP anesthesia mangement and issues during anesthesia, Set expectations for post-procedure period and Allowed opportunity for questions and acknowledgement of understanding      Vitals: (Last set prior to Anesthesia Care Transfer)    CRNA VITALS  10/7/2019 0146 - 10/7/2019 0222      10/7/2019             NIBP:  103/71    Pulse:  57    SpO2:  100 %    Resp Rate (observed):  16                Electronically Signed By: HU Ye CRNA  October 7, 2019  2:22 AM

## 2019-10-07 NOTE — ANESTHESIA POSTPROCEDURE EVALUATION
Anesthesia POST Procedure Evaluation    Patient: Nevin Alvarado   MRN:     [id number] Gender:   female   Age:    28 year old :      1990        Preoperative Diagnosis: * No pre-op diagnosis entered *   Procedure(s):  ESOPHAGOGASTRODUODENOSCOPY (EGD) with fireign body removal x2, esophageal stent placement with floroscopy   Postop Comments: No value filed.       Anesthesia Type:  General  General    Reportable Event: NO     PAIN: Uncomplicated   Sign Out status: Comfortable, Well controlled pain     PONV: No PONV   Sign Out status:  No Nausea or Vomiting     Neuro/Psych: Uneventful perioperative course   Sign Out Status: Preoperative baseline; Age appropriate mentation     Airway/Resp.: Uneventful perioperative course   Sign Out Status: Non labored breathing, age appropriate RR; Resp. Status within EXPECTED Parameters     CV: Uneventful perioperative course   Sign Out status: Appropriate BP and perfusion indices; Appropriate HR/Rhythm     Disposition:   Sign Out in:  PACU  Disposition:  Floor  Recovery Course: Uneventful  Follow-Up: Not required           Last Anesthesia Record Vitals:  CRNA VITALS  10/7/2019 0146 - 10/7/2019 0233      10/7/2019             NIBP:  103/71    Pulse:  57    SpO2:  100 %    Resp Rate (observed):  16          Last PACU Vitals:  Vitals Value Taken Time   /77 10/7/2019  2:30 AM   Temp 36.4  C (97.6  F) 10/7/2019  2:20 AM   Pulse 60 10/7/2019  2:30 AM   Resp 18 10/7/2019  2:20 AM   SpO2 97 % 10/7/2019  2:32 AM   Temp src     NIBP 103/71 10/7/2019  2:21 AM   Pulse 57 10/7/2019  2:21 AM   SpO2 100 % 10/7/2019  2:21 AM   Resp     Temp     Ht Rate     Temp 2     Vitals shown include unvalidated device data.      Electronically Signed By: Alley Castle MD, 2019, 2:33 AM

## 2019-10-07 NOTE — PLAN OF CARE
Neuro: A&Ox4, pt reports no suicidal ideation, 1:1 attendant at bedside for safety.  No attempts to ingest any foreign objects. Pt states she will tell the nurse if she feels the impulse to ingest anything.  Cardiac: WDL, post op vitals monitored  Resp: Lung sounds clear/diminished, shallow breaths d/t pain, no SOB reported, sating well on 4L humidified O2, capno in place  GI: Bowel sounds hypoactive, LBM 10/6  : Voiding spontaneously in adequate amounts  Skin: Midline incision from previous surgery intact with steri-strips, ab binder in place.  Pain/Nausea: operative back/chest/throat pain treated with liquid oxycodone, toradol, and prn IV morphine. Zofran given x 1 for nausea.   LDA: R chest port infusing MIV and antibiotics.  Diet: NPO.   Mobility: SBA, pt ambulated to bathroom and up in guerra with attendant.  Labs: Lactic acid 2.0. MD notified, no new orders.  Plan: pt have sutures placed per GI possibly today or tomorrow.  Psych consult done, bedside attendant for safety, continue plan of care

## 2019-10-07 NOTE — ED PROVIDER NOTES
[place] EMERGENCY DEPARTMENT ([facility])  October 6, 2019    History     Chief Complaint   Patient presents with     Swallowed Foreign Body     HPI  Nevin Alvarado is a 28 year old female with history notable for chronic abdominal pain and self-injurious behavior (ingestion of foreign bodies, placing objects into her rectum that require surgical extractions, and overdoses), depressive disorder, anxiety, and borderline personality disorder who was transferred from [facility] for swallowed foreign body.  Per review Care Everywhere, patient was seen at [place] ED today.  She had reported swallowing 2 opened paperclips at 5:30 PM and denies this was an attempt to harm herself or SI.  Abdominal x-ray was obtained and showed 2 adjacent linear metallic foreign bodies projected over the midline upper abdomen.  They performed an endoscopy to remove the paperclips. However, they were unsuccessful, the rubber simons did not invert, and the paperclips with the polypectomy snare became impaled in the GE junction. They contacted Thoracic Surgery here, who accepted the patient. They also started her on clindamycin and Flagyl.  CT of the chest showed posterior mediastinal area surrounding the esophagus extending from the thoracic inlet to the GE junction.  There is also esophageal probe present that extends to the GE junction into the extraluminal lesser omentum with surrounding air on the probe.    Per EMS, patient has received 4 mg of morphine without improvement of her pain.     PAST MEDICAL HISTORY  Past Medical History:   Diagnosis Date     ADD (attention deficit disorder)      Anorexia nervosa with bulimia     history of; on Topamax     Anxiety      Borderline personality disorder (H)      Depression      Depressive disorder      H/O self-harm      Lives in independent group home     due to debilitating mental illness     Migraine without aura     no known triggers; on Topamax bid and Imitrex PRN      Morbid obesity (H)      PTSD (post-traumatic stress disorder)      Rectal foreign body - Recurrent issue, self placed      Swallowed foreign body - Recurrent issue, self placed      PAST SURGICAL HISTORY  Past Surgical History:   Procedure Laterality Date     ESOPHAGOSCOPY, GASTROSCOPY, DUODENOSCOPY (EGD), COMBINED N/A 3/9/2017    Procedure: COMBINED ESOPHAGOSCOPY, GASTROSCOPY, DUODENOSCOPY (EGD), REMOVE FOREIGN BODY;  Surgeon: Avis Guzmán MD;  Location: [facility] OR     ESOPHAGOSCOPY, GASTROSCOPY, DUODENOSCOPY (EGD), COMBINED N/A 4/20/2017    Procedure: COMBINED ESOPHAGOSCOPY, GASTROSCOPY, DUODENOSCOPY (EGD), REMOVE FOREIGN BODY;  EGD removal Foregin body;  Surgeon: Lokesh Paula MD;  Location: [facility] OR     ESOPHAGOSCOPY, GASTROSCOPY, DUODENOSCOPY (EGD), COMBINED N/A 6/12/2017    Procedure: COMBINED ESOPHAGOSCOPY, GASTROSCOPY, DUODENOSCOPY (EGD);  COMBINED ESOPHAGOSCOPY, GASTROSCOPY, DUODENOSCOPY (EGD) [[id number]]attempted removal of foreign body;  Surgeon: Pamela Perez MD;  Location: [facility] OR     ESOPHAGOSCOPY, GASTROSCOPY, DUODENOSCOPY (EGD), COMBINED N/A 6/9/2017    Procedure: COMBINED ESOPHAGOSCOPY, GASTROSCOPY, DUODENOSCOPY (EGD), REMOVE FOREIGN BODY;  Esophagoscopy, Gastroscopy, Duodenoscopy, Removal of Foreign Body;  Surgeon: Dejon Marsh MD;  Location: [facility] OR     ESOPHAGOSCOPY, GASTROSCOPY, DUODENOSCOPY (EGD), COMBINED N/A 1/6/2018    Procedure: COMBINED ESOPHAGOSCOPY, GASTROSCOPY, DUODENOSCOPY (EGD), REMOVE FOREIGN BODY;  COMBINED ESOPHAGOSCOPY, GASTROSCOPY, DUODENOSCOPY (EGD) [by pascal net and snare with profol sedation;  Surgeon: Feliciano Emmanuel MD;  Location:  GI     ESOPHAGOSCOPY, GASTROSCOPY, DUODENOSCOPY (EGD), COMBINED N/A 3/19/2018    Procedure: COMBINED ESOPHAGOSCOPY, GASTROSCOPY, DUODENOSCOPY (EGD), REMOVE FOREIGN BODY;   Esophagodscopy, Gastroscopy, Duodenoscopy,Foreign Body Removal;  Surgeon: Brice Guzmán MD;  Location: [facility]     ESOPHAGOSCOPY,  GASTROSCOPY, DUODENOSCOPY (EGD), COMBINED N/A 4/16/2018    Procedure: COMBINED ESOPHAGOSCOPY, GASTROSCOPY, DUODENOSCOPY (EGD), REMOVE FOREIGN BODY;  Esophagogastroduodenoscopy  Foreign Body Removal X 2;  Surgeon: Royer Moise MD;  Location: [facility]     ESOPHAGOSCOPY, GASTROSCOPY, DUODENOSCOPY (EGD), COMBINED N/A 6/1/2018    Procedure: COMBINED ESOPHAGOSCOPY, GASTROSCOPY, DUODENOSCOPY (EGD), REMOVE FOREIGN BODY;  COMBINED ESOPHAGOSCOPY, GASTROSCOPY, DUODENOSCOPY with removal of foreign body, propofol sedation by anesthesia;  Surgeon: Brice Martinez MD;  Location:  GI     ESOPHAGOSCOPY, GASTROSCOPY, DUODENOSCOPY (EGD), COMBINED N/A 7/25/2018    Procedure: COMBINED ESOPHAGOSCOPY, GASTROSCOPY, DUODENOSCOPY (EGD), REMOVE FOREIGN BODY;;  Surgeon: Candy Castelan MD;  Location:  GI     ESOPHAGOSCOPY, GASTROSCOPY, DUODENOSCOPY (EGD), COMBINED N/A 7/28/2018    Procedure: COMBINED ESOPHAGOSCOPY, GASTROSCOPY, DUODENOSCOPY (EGD), REMOVE FOREIGN BODY;  COMBINED ESOPHAGOSCOPY, GASTROSCOPY, DUODENOSCOPY (EGD), REMOVE FOREIGN BODY;  Surgeon: Brice Guzmán MD;  Location: [facility]     ESOPHAGOSCOPY, GASTROSCOPY, DUODENOSCOPY (EGD), COMBINED N/A 7/31/2018    Procedure: COMBINED ESOPHAGOSCOPY, GASTROSCOPY, DUODENOSCOPY (EGD);  COMBINED ESOPHAGOSCOPY, GASTROSCOPY, DUODENOSCOPY (EGD) TO REMOVE FOREIGN BODY;  Surgeon: Lokesh Paula MD;  Location: [facility]     ESOPHAGOSCOPY, GASTROSCOPY, DUODENOSCOPY (EGD), COMBINED N/A 8/4/2018    Procedure: COMBINED ESOPHAGOSCOPY, GASTROSCOPY, DUODENOSCOPY (EGD), REMOVE FOREIGN BODY;   combined esophagoscopy, gastroscopy, duodenoscopy, REMOVE FOREIGN BODY ;  Surgeon: Lokesh Paula MD;  Location: [facility]     EXAM UNDER ANESTHESIA ANUS N/A 1/10/2017    Procedure: EXAM UNDER ANESTHESIA ANUS;  Surgeon: Annmarie Haynes MD;  Location: [facility]     EXAM UNDER ANESTHESIA RECTUM N/A 7/19/2018    Procedure: EXAM UNDER ANESTHESIA RECTUM;  EXAM UNDER ANESTHESIA,  REMOVAL OF RECTAL FOREIGN BODY;  Surgeon: Annmarie Haynes MD;  Location: [facility] OR     HC REMOVE FECAL IMPACTION OR FB W ANESTHESIA N/A 12/18/2016    Procedure: REMOVE FECAL IMPACTION/FOREIGN BODY UNDER ANESTHESIA;  Surgeon: Soham Cano MD;  Location: [facility] OR     KNEE SURGERY - removed a small tissue mass from knee Right      LAPAROSCOPIC ABLATION ENDOMETRIOSIS       LAPAROTOMY EXPLORATORY N/A 1/10/2017    Procedure: LAPAROTOMY EXPLORATORY;  Surgeon: Annmarie Haynes MD;  Location: [facility] OR     lymph nodes removed from neck; benign  age 7     MAMMOPLASTY REDUCTION Bilateral      RELEASE CARPAL TUNNEL Bilateral      SIGMOIDOSCOPY FLEXIBLE N/A 1/10/2017    Procedure: SIGMOIDOSCOPY FLEXIBLE;  Surgeon: Annmarie Haynes MD;  Location: [facility]     SIGMOIDOSCOPY FLEXIBLE N/A 5/8/2018    Procedure: SIGMOIDOSCOPY FLEXIBLE;  flex sig with foreign body removal using snare and rattooth forcep;  Surgeon: Soham Cano MD;  Location: [facility] GI     SIGMOIDOSCOPY FLEXIBLE N/A 2/20/2019    Procedure: Exam under anesthesia Colonoscopy with attempted  removal of rectal foreign body;  Surgeon: Estrada Chávez MD;  Location: [facility] OR     FAMILY HISTORY  Family History   Problem Relation Age of Onset     Diabetes Type 2  Maternal Grandmother      Diabetes Type 2  Paternal Grandmother      Breast Cancer Paternal Grandmother      Cerebrovascular Disease Father 53     Myocardial Infarction No family hx of      Coronary Artery Disease Early Onset No family hx of      Ovarian Cancer No family hx of      Colon Cancer No family hx of      SOCIAL HISTORY  Social History     Tobacco Use     Smoking status: Never Smoker     Smokeless tobacco: Never Used   Substance Use Topics     Alcohol use: No     Alcohol/week: 0.0 standard drinks     MEDICATIONS  Current Facility-Administered Medications   Medication     morphine (PF) injection 4 mg     Current Outpatient Medications   Medication     albuterol (PROAIR HFA) 108 (90 Base)  "MCG/ACT inhaler     CLONIDINE HCL PO     Desvenlafaxine Succinate (PRISTIQ PO)     gabapentin (NEURONTIN) 100 MG capsule     lurasidone (LATUDA) 60 MG TABS tablet     ALLERGIES  Allergies   Allergen Reactions     Amoxicillin-Pot Clavulanate Other (See Comments), Rash and Swelling     PN: facial swelling, left side. Also had cortisone injection the same day as she started the Augmentin.  PN: facial swelling, left side. Also had cortisone injection the same day as she started the Augmentin.  Noted in downtime recovery of chart.       Penicillins Anaphylaxis     Blood-Group Specific Substance Other (See Comments)     Patient has an anti-Cw and non-specific antibodies. Blood product orders may be delayed. Draw one red top and two purple top tubes for all type/screen/crossmatch orders.     Hydrocodone Nausea and Vomiting     vomiting for days     Influenza Vaccines Other (See Comments)     Oseltamivir Hives     med stopped, PN: med stopped     Vicodin [Hydrocodone-Acetaminophen] Nausea and Vomiting     Cephalosporins Rash     Lamotrigine Rash     Possibly associated with Lamictal.      Latex Rash         I have reviewed the Medications, Allergies, Past Medical and Surgical History, and Social History in the Epic system.    Review of Systems   Gastrointestinal: Positive for abdominal pain.   Psychiatric/Behavioral: Negative for self-injury and suicidal ideas.   All other systems reviewed and are negative.      Physical Exam   BP: 116/84  Heart Rate: 87  Temp: 99.3  F (37.4  C)  Resp: 16  Height: 157.5 cm (5' 2\")  Weight: 121.6 kg (268 lb)  SpO2: 96 %      Physical Exam  Constitutional:       General: She is not in acute distress.     Appearance: She is not diaphoretic.   HENT:      Head: Atraumatic.      Mouth/Throat:      Pharynx: No oropharyngeal exudate.      Comments: Endoscopy wire emerging from patient's mouth  Eyes:      General: No scleral icterus.  Neck:      Musculoskeletal: Neck supple.   Cardiovascular:      " Rate and Rhythm: Normal rate and regular rhythm.      Heart sounds: Normal heart sounds.   Pulmonary:      Effort: No respiratory distress.      Breath sounds: Normal breath sounds.   Abdominal:      General: There is no distension.      Palpations: Abdomen is soft.      Tenderness: There is no tenderness.   Musculoskeletal:         General: No deformity.   Skin:     General: Skin is warm and dry.   Neurological:      Mental Status: She is alert.   Psychiatric:         Behavior: Behavior normal.         ED Course        Procedures           Labs Ordered and Resulted from Time of ED Arrival Up to the Time of Departure from the ED   COMPREHENSIVE METABOLIC PANEL - Abnormal; Notable for the following components:       Result Value    Chloride 110 (*)     Glucose 103 (*)     Albumin 3.3 (*)     All other components within normal limits   INR - Abnormal; Notable for the following components:    INR 1.16 (*)     All other components within normal limits   CBC WITH PLATELETS DIFFERENTIAL   PARTIAL THROMBOPLASTIN TIME   ABO/RH TYPE AND SCREEN          Results for orders placed or performed during the hospital encounter of 10/06/19   XR Chest Port 1 View    Narrative    1.  Multiple foreign bodies detailed as above.  2.  Mild to moderate pneumomediastinum.  3.  Lungs are clear.   CBC with platelets differential   Result Value Ref Range    WBC 8.2 4.0 - 11.0 10e9/L    RBC Count 4.23 3.8 - 5.2 10e12/L    Hemoglobin 12.1 11.7 - 15.7 g/dL    Hematocrit 37.8 35.0 - 47.0 %    MCV 89 78 - 100 fl    MCH 28.6 26.5 - 33.0 pg    MCHC 32.0 31.5 - 36.5 g/dL    RDW 14.3 10.0 - 15.0 %    Platelet Count 231 150 - 450 10e9/L    Diff Method Automated Method     % Neutrophils 66.7 %    % Lymphocytes 23.2 %    % Monocytes 6.2 %    % Eosinophils 3.2 %    % Basophils 0.6 %    % Immature Granulocytes 0.1 %    Nucleated RBCs 0 0 /100    Absolute Neutrophil 5.5 1.6 - 8.3 10e9/L    Absolute Lymphocytes 1.9 0.8 - 5.3 10e9/L    Absolute Monocytes 0.5  0.0 - 1.3 10e9/L    Absolute Eosinophils 0.3 0.0 - 0.7 10e9/L    Absolute Basophils 0.1 0.0 - 0.2 10e9/L    Abs Immature Granulocytes 0.0 0 - 0.4 10e9/L    Absolute Nucleated RBC 0.0    Comprehensive metabolic panel   Result Value Ref Range    Sodium 139 133 - 144 mmol/L    Potassium 3.4 3.4 - 5.3 mmol/L    Chloride 110 (H) 94 - 109 mmol/L    Carbon Dioxide 24 20 - 32 mmol/L    Anion Gap 5 3 - 14 mmol/L    Glucose 103 (H) 70 - 99 mg/dL    Urea Nitrogen 9 7 - 30 mg/dL    Creatinine 0.60 0.52 - 1.04 mg/dL    GFR Estimate >90 >60 mL/min/[1.73_m2]    GFR Estimate If Black >90 >60 mL/min/[1.73_m2]    Calcium 8.5 8.5 - 10.1 mg/dL    Bilirubin Total 0.2 0.2 - 1.3 mg/dL    Albumin 3.3 (L) 3.4 - 5.0 g/dL    Protein Total 8.0 6.8 - 8.8 g/dL    Alkaline Phosphatase 74 40 - 150 U/L    ALT 23 0 - 50 U/L    AST 16 0 - 45 U/L   INR   Result Value Ref Range    INR 1.16 (H) 0.86 - 1.14   ABO/Rh type and screen   Result Value Ref Range    ABO PENDING     Antibody Screen PENDING     Test Valid Only At          [facility]    Specimen Expires 10/09/2019        Assessments & Plan (with Medical Decision Making)   28-year-old female presents to us with a chief complaint of esophageal perforation and swallowed foreign body.  Foreign bodies were attempted to be removed by GI at previous hospital.  The endoscopy snare got caught and was unable to be removed and there was suspicion of esophageal perforation.  Upon arrival here patient is in no distress with normal vital signs.  She received antibiotics prehospital.  Discussed with Dr. Espana thoracic surgery as well as surgery resident.  Patient will proceed to the operating room for management.    I have reviewed the nursing notes.    I have reviewed the findings, diagnosis, plan and need for follow up with the patient.    New Prescriptions    No medications on file       Final diagnoses:   Esophageal perforation   Foreign body in esophagus,  initial encounter     I, Yury Hill, am serving as a trained medical scribe to document services personally performed by  Efraín Diego DO, based on the provider's statements to me.      I, Efraín Diego DO, was physically present and have reviewed and verified the accuracy of this note documented by Yury Hill.     10/6/2019   [facility], [place], EMERGENCY DEPARTMENT     Efraín Diego DO  10/06/19 2207

## 2019-10-07 NOTE — PROGRESS NOTES
THORACIC & FOREGUT SURGERY    S:  Pt seen at bedside in some discomfort.         O:  Vitals:    10/07/19 0930 10/07/19 1000 10/07/19 1100 10/07/19 1500   BP: (!) 160/90 129/66 (!) 151/96 138/82   BP Location: Right arm Right arm Right arm Right arm   Pulse:       Resp: 17 20 16 16   Temp:   99.8  F (37.7  C) 98.1  F (36.7  C)   TempSrc:   Oral Oral   SpO2: 96% 95% 95% 98%   Weight:       Height:           A&O, NAD but intermittently tearful  Breathing non-labored  Soft, NDNT, incisions present from recent abdominal surgery  Distal extremities warm    Leukocytosis noted, will follow    A/P: Nevin Alvarado is a 28 year old female POD# 0 s/p EGD with foreign body removal.  Clinically stable.     -GI consult for anti-migration stent suture placement   -Esophogram once suture in place  -Daily CXR  -OK for diet if esophogram negative   -Lovenox     ZACHARY FossC  Thoracic Surgery

## 2019-10-07 NOTE — PROGRESS NOTES
BRIEF OPERATIVE NOTE  28 year old female with a history of depressive disorder, anxiety, borderline personality disorder and chronic abdominal pain as well as a history of self-injurious behavior with multiple foreign body ingestions and rectal insertions, transferred from an OSH after failed endoscopic removal of 2 straightened paper clips.    Following EGD, clips were impaled in esophagus and snare was left in place.  CT chest from OSH reviewed and noted    Patient taken to the OR emergently for EGD for foreign body removal and esophageal stent placement with Thoracic surgery.    INTRA- OP:  EGD done and significant for impaled straight paper clip attached to snare, s/p successful removal with snare and simons.   Endoclips in proximal esophagus also noted as well.   Esophageal perforation with muscular defect secondary to impaled foreign object extending from 26 to 32 cm. A contrast study confirmed the perforation at this site. Esophageal stent placed successfully across this rent and proximal margin anchored with endoclips.  A repeat contrast study showed no extravasation.  Minimal blood loss at end of procedure.     PLAN:  -- mIVF  -- Keep NPO for now  -- Pain control and anti-nausea medication per primary team  -- Psych consultation  -- Recommend antibiotics and anti-fungals  -- Need sitter at all times    Discussed with Dr. Rogers.    Karina Casanova MD  GI fellow   PGY 5
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
## 2019-10-07 NOTE — ED TRIAGE NOTES
Pt. presents to ED from [facility] for swallowing a foreign body with failed retrieval. AVSS on RA, A & O x 4. Port a cath accessed upon arrival. EMS reports AVSS during transfer and 4 mg of morphine for pain given.

## 2019-10-07 NOTE — SIGNIFICANT EVENT
Pt arrived from from PACU at approx 0350 after EGD and removal of foreign body and stenting.  Pt with 1:1 orders.  Nursing supervisor on this campus and [facility] involved & psych tech arranged.  Belongings out of room per policy.  Pt requested her cell phone which was approved by supervisors for use.  Monitor per 1:1 policy.

## 2019-10-07 NOTE — ED NOTES
[facility], [place]   ED Nurse to Floor Handoff     Nevin Alvarado is a 28 year old female who speaks English and lives with others,  in a group home  They arrived in the ED by ambulance from emergency room    ED Chief Complaint: Swallowed Foreign Body    ED Dx;   Final diagnoses:   Esophageal perforation   Foreign body in esophagus, initial encounter         Needed?: No    Allergies:   Allergies   Allergen Reactions     Amoxicillin-Pot Clavulanate Other (See Comments), Rash and Swelling     PN: facial swelling, left side. Also had cortisone injection the same day as she started the Augmentin.  PN: facial swelling, left side. Also had cortisone injection the same day as she started the Augmentin.  Noted in downtime recovery of chart.       Penicillins Anaphylaxis     Blood-Group Specific Substance Other (See Comments)     Patient has an anti-Cw and non-specific antibodies. Blood product orders may be delayed. Draw one red top and two purple top tubes for all type/screen/crossmatch orders.     Hydrocodone Nausea and Vomiting     vomiting for days     Influenza Vaccines Other (See Comments)     Oseltamivir Hives     med stopped, PN: med stopped     Vicodin [Hydrocodone-Acetaminophen] Nausea and Vomiting     Cephalosporins Rash     Lamotrigine Rash     Possibly associated with Lamictal.      Latex Rash   .  Past Medical Hx:   Past Medical History:   Diagnosis Date     ADD (attention deficit disorder)      Anorexia nervosa with bulimia     history of; on Topamax     Anxiety      Borderline personality disorder (H)      Depression      Depressive disorder      H/O self-harm      Lives in independent group home     due to debilitating mental illness     Migraine without aura     no known triggers; on Topamax bid and Imitrex PRN     Morbid obesity (H)      PTSD (post-traumatic stress disorder)      Rectal foreign body - Recurrent issue, self placed      Swallowed foreign body  "- Recurrent issue, self placed       Baseline Mental status: WDL  Current Mental Status changes: at basesline    Infection present or suspected this encounter: no  Sepsis suspected: No  Isolation type: No active isolations     Activity level - Baseline/Home:  Independent  Activity Level - Current:   Independent    Bariatric equipment needed?: No    In the ED these meds were given:   Medications   morphine (PF) injection 4 mg (has no administration in time range)   morphine (PF) 2 MG/ML injection (4 mg  Given 10/6/19 4805)       Drips running?  Yes  Metronidazole running upon arrival  Home pump  No    Current LDAs       Labs results:   Labs Ordered and Resulted from Time of ED Arrival Up to the Time of Departure from the ED   COMPREHENSIVE METABOLIC PANEL - Abnormal; Notable for the following components:       Result Value    Chloride 110 (*)     Glucose 103 (*)     Albumin 3.3 (*)     All other components within normal limits   CBC WITH PLATELETS DIFFERENTIAL   INR   PARTIAL THROMBOPLASTIN TIME   ABO/RH TYPE AND SCREEN       Imaging Studies: No results found for this or any previous visit (from the past 24 hour(s)).    Recent vital signs:   /84   Temp 99.3  F (37.4  C) (Oral)   Resp 16   Ht 1.575 m (5' 2\")   Wt 121.6 kg (268 lb)   SpO2 96%   Breastfeeding? No   BMI 49.02 kg/m      [place] Coma Scale Score: 15 (10/06/19 2258)       Cardiac Rhythm: none  Pt needs tele? No  Skin/wound Issues: None    Code Status: Full Code    Pain control: fair    Nausea control: pt had none    Abnormal labs/tests/findings requiring intervention: foreign body, esophageal perforation    Family present during ED course? Yes   Family Comments/Social Situation comments: sister @ bedside    Tasks needing completion: None      [phone] [facility]     "

## 2019-10-07 NOTE — PLAN OF CARE
"Shift: pt arrived to floor from PACU at 0345 - 0700  VS: BP (!) 143/89 (BP Location: Right arm)   Pulse 95   Temp 97.6  F (36.4  C) (Oral)   Resp 20   Ht 1.575 m (5' 2\")   Wt 121.6 kg (268 lb)   SpO2 95%   Breastfeeding? No   BMI 49.02 kg/m      Neuro: A&Ox4, pt reports no suicidal ideation, 1:1 attendant at bedside for safety  Cardiac: WDL, post op vitals monitored  Resp: lung sounds clear/diminished, shallow breaths d/t pain, no SOB reported, sating well on 4L humidified O2, capno in place, IS encouraged  GI: not passing gas, bowel sounds hypoactive, LBM 10/6  : voiding spontaneously, adequate amount of micheline urine   Skin: midline incision from previous surgery approximated with steri-strips, ab binder in place; skin otherwise intact  Pain/Nausea: operative chest/throat pain treated with IV dilaudid 0.2 x2, pt given 1 time dose 1 hr following initial dose d/t inadequate pain control - provider to pass on to day team to reevaluate pain regimen; denies nausea  LDA: R chest port infusing IVMF and abx/antifungals per MAR  Diet: strict NPO, no oral pain meds given d/t stenting  Mobility: SBA, pt ambulated to bathroom this shift  Labs: reviewed  Plan: pt to possibly return to OR today for esophageal perf sutures, maintain NPO status, psych consult, bedside attendant for safety, continue plan of care     "

## 2019-10-07 NOTE — OP NOTE
Preoperative diagnosis:                        Esophageal foreign body  Postoperative diagnosis:                       Esophageal foreign body    Procedure:   1. Esophageal stent insertion with fluoroscopy    Anesthesia: General   Surgeon: Timoteo Espana   Co-Surgeon:  Tadeo Rogers MD  Resident surgeon: Jonna Lowery  EBL:  Minimal    Complications: none immediate  Findings:   Muscular tear 26-32 cm; full thickness perforation 32-34 cm.  23 x 120 mm fully covered esophageal stent placed at 24 cm with no extravasation after placement.  Clips placed to temporarily secure stent.     Description of procedure   The patient was brought to the room and placed supine upon the table.  After confirming the patient's identity and the consent, appropriate monitoring devices were placed as well as a CT boots.  General anesthesia was administered and the patient was intubated.  A bite block was placed.  Dr. Rogers performed endoscopy to remove the paperclips.  The attached snare could not be used to remove the clips, as the attachment point was extraluminal.  Dr. Rogers moved the clips into the stomach and the snare was removed.  His portion will be dictated separately.  After removal of the clips, the esophagus was inspected.  There was a muscular defect from 26-32 cm from the incisors and what appeared to be a full thickness perforation from 32-34 cm from the incisors.  A contrast study confirmed the perforation at this site.  External fiducials were placed under fluoroscopy and a guidewire was placed into the duodenum.  The scope was removed and a 23 x 120 mm covered Endomaxx stent was placed from 24 cm without difficulty.  A contrast study showed no extravasation and no waisting of the stent.  Two clips were deployed to temporarily secure the stent in position.

## 2019-10-07 NOTE — PHARMACY-ADMISSION MEDICATION HISTORY
"Admission medication history interview status for the 10/6/2019 admission is complete. See Epic admission navigator for allergy information, pharmacy, prior to admission medications and immunization status.     Medication history interview sources:  Patient and outside med list    Changes made to PTA medication list (reason)  Added:   Docusate sodium 100 mg capsules- using this for intermittent constipation  Acetaminophen 500 mg tablets- using for generalized pain and headache  Buspirone 10 mg tablets- using for depression  Cholecalciferol 2,000 unit capsules- using for vitamin d supplementation  Cyclobenzaprine 10 mg tablets- Patient states that she picked up this medication but hasn't taken it yet  Diphenhydramine 25 mg capsules- using for itching or sleep  Metformin  mg tablets- using for weight control  Topiramate 100 mg tablets- using for migraine prophylaxis    Deleted: None    Changed:  Clonidine 0.1 mg tablets- Changed from \" Take 0.05 mg by mouth 2 times daily\" to \"Take 0.1 mg by mouth 2 times daily.\"  Gabapentin 100 mg capsules \"take 300 mg 2 times daily\" to gabapentin 300 mg capsules \"take 2 capsules (600 mg) by mouth three times daily.\"    Additional medication history information (including reliability of information, actions taken by pharmacist):    Nevin was coherent and seemed to be a good historian in regards to her medications. She keeps a medication list on her phone that she referenced multiple times throughout the interview.    - Nevin states that she takes her morning medications at 8 AM and her evening medications at 8 PM. She takes her twice daily medications at those times as well. She takes her gabapentin at 8 AM, 2 PM, and 8 PM, and her metformin at 5 pm. She was able to take her morning doses of her medications on 10/06/19 before being admitted.    - Nevin explains that she only uses her albuterol inhaler when she exercises, and only uses it 1-2 times per day when she " exercises. Her most recent use was last week.    - Nevin states that she uses acetaminophen for pain or headache, and only needs to use it a few times per month. Her most recent use was last week.    -Nevin says that she uses docusate as needed for constipation. When she uses this medication, she takes 1 capsule twice daily. She states that it has been a few days since she has used it and she normally only uses it 2-3 days per month.    - Nevin says that she picked up her prescription for cyclobenzaprine but hasn't taken any doses yet.    - Nevin states that she doesn't take the diphenhydramine often and it has been a few months since she used it last.    - Nevin mentioned that she has an allergy to vancomycin and her reaction was facial swelling.    Prior to Admission medications    Medication Sig Last Dose Taking? Auth Provider   acetaminophen (TYLENOL) 500 MG tablet Take 2 tablets (1,000 mg) by mouth every 6 hours as needed for pain or headache Past Week at Unknown time Yes Unknown, Entered By History   albuterol (PROAIR HFA) 108 (90 Base) MCG/ACT inhaler Inhale 2 puffs into the lungs 4 times daily as needed  Past Week at Unknown time Yes Reported, Patient   busPIRone (BUSPAR) 10 MG tablet Take 1 tablet (10 mg) by mouth twice daily 10/6/2019 at 0800 Yes Unknown, Entered By History   CLONIDINE HCL PO Take 0.1 mg by mouth 2 times daily  10/6/2019 at 0800 Yes Reported, Patient   Desvenlafaxine Succinate (PRISTIQ PO) Take 1 tablet (100 mg) by mouth every morning 10/6/2019 at 0800 Yes Reported, Patient   docusate sodium (COLACE) 100 MG capsule Take 1 capsule (100 mg) by mouth twice daily as needed for constipation Past Week at Unknown time Yes Unknown, Entered By History   gabapentin (NEURONTIN) 300 MG capsule Take 2 capsules (600 mg) by mouth three times daily 10/6/2019 at 1400 Yes Reported, Patient   lurasidone (LATUDA) 60 MG TABS tablet Take 1 tablet (60 mg) by mouth at bedtime 10/5/2019 at 2000 Yes  Reported, Patient   metFORMIN (GLUCOPHAGE-XR) 500 MG 24 hr tablet Take 1 tablet (500 mg) by mouth daily 10/5/2019 at 1700 Yes Unknown, Entered By History   topiramate (TOPAMAX) 100 MG tablet Take 1 tablet (100 mg) by mouth daily at bedtime 10/5/2019 at 2000 Yes Unknown, Entered By History   vitamin D3 (CHOLECALCIFEROL) 2000 units (50 mcg) tablet Take 1 tablet (2,000 units) by mouth daily 10/6/2019 at 0800 Yes Unknown, Entered By History   cyclobenzaprine (FLEXERIL) 10 MG tablet Take 1 tablet (10 mg) by mouth three times daily as needed for muscle spasms   Unknown, Entered By History   diphenhydrAMINE (BENADRYL) 25 MG capsule Take 1-2 capsules (25-50 mg) by mouth every 6 hours as needed for itching or sleep More than a month at Unknown time  Unknown, Entered By History         Medication history completed by:   Campbell Thomas, PharmD Candidate 2020 October 7, 2019

## 2019-10-07 NOTE — CONSULTS
Thoracic Surgery Consultation    Nevin Alvarado  MRN#: [id number]    Date of Admission:  10/6/2019    Date of Consult: 10/6/2019    Reason for consult: Foreign body ingestion       Requesting service: Emergency Dept       Requesting provider: Dr. Diego                   Assessment and Plan:   Assessment:   Nevin Alvarado is a 28 year old female with a h/o depressive disorder, anxiety, borderline personality disorder, chronic abdominal pain and self-injurious behavior (multiple foreign body ingestions and rectal placements with surgical extracations) who presents as a transfer from [facility] after foreign body ingestion (2 paperclips) with failed extraction today.        Plan:   - Admit to Thoracic Surgery  - Keep NPO except psych meds  - Emergent OR for EGD with foreign body removal and possible laparoscopy  - GI luminal consult  - MIVF  - antiemetics  - pain control  - psych consult  - Will need 1:1 monitoring    Discussed with Staff, Dr. Espana.    Jonna Lezama,   General Surgery, PGY2  Crosscover Pager: [phone]              Chief Complaint:   Foreign body ingestion         History of Present Illness:   Nevin Alvarado is a 28 year old female with a h/o depressive disorder, anxiety, borderline personality disorder, chronic abdominal pain and self-injurious behavior (multiple foreign body ingestions and rectal placements with surgical extractions) who presents as a transfer from [facility] after foreign body ingestion.    She reports that around 4-5pm she ingested two opened/straightened-out paperclips. They performed an endoscopy to remove the paperclips and were unsuccessful. The rubber simons did not invert, and the paperclips with the polypectomy snare became impaled in the GE junction.     She was started on Clindamycin and Flagyl at the outside hospital given her allergy history. CT of the chest showed posterior mediastinal area surrounding the esophagus extending from  the thoracic inlet to the GE junction.  There is also esophageal probe present that extends to the GE junction into the extraluminal lesser omentum with surrounding air on the probe.    Here she reports chest pain with deep breathing; chest pain starts substernal and radiates laterally. She also endorses epigastric pain. Of note she recently underwent exploratory laparotomy on 9/11/2019 for foreign body removal from the colon with digital manipulation, no colonic resection needed. She has had 3 foreign body ingestions this week with paper clips removed just a few days ago.         Past Medical History:     Past Medical History:   Diagnosis Date     ADD (attention deficit disorder)      Anorexia nervosa with bulimia     history of; on Topamax     Anxiety      Borderline personality disorder (H)      Depression      Depressive disorder      H/O self-harm      Lives in independent group home     due to debilitating mental illness     Migraine without aura     no known triggers; on Topamax bid and Imitrex PRN     Morbid obesity (H)      PTSD (post-traumatic stress disorder)      Rectal foreign body - Recurrent issue, self placed      Swallowed foreign body - Recurrent issue, self placed              Past Surgical History:     Past Surgical History:   Procedure Laterality Date     ESOPHAGOSCOPY, GASTROSCOPY, DUODENOSCOPY (EGD), COMBINED N/A 3/9/2017    Procedure: COMBINED ESOPHAGOSCOPY, GASTROSCOPY, DUODENOSCOPY (EGD), REMOVE FOREIGN BODY;  Surgeon: Avis Guzmán MD;  [facility]     ESOPHAGOSCOPY, GASTROSCOPY, DUODENOSCOPY (EGD), COMBINED N/A 4/20/2017    Procedure: COMBINED ESOPHAGOSCOPY, GASTROSCOPY, DUODENOSCOPY (EGD), REMOVE FOREIGN BODY;  EGD removal Foregin body;  Surgeon: Lokesh Paula MD;  [facility]     ESOPHAGOSCOPY, GASTROSCOPY, DUODENOSCOPY (EGD), COMBINED N/A 6/12/2017    Procedure: COMBINED ESOPHAGOSCOPY, GASTROSCOPY, DUODENOSCOPY (EGD);  COMBINED ESOPHAGOSCOPY, GASTROSCOPY,  DUODENOSCOPY (EGD) [[id number]]attempted removal of foreign body;  Surgeon: Pamela Perez MD;  [facility]     ESOPHAGOSCOPY, GASTROSCOPY, DUODENOSCOPY (EGD), COMBINED N/A 6/9/2017    Procedure: COMBINED ESOPHAGOSCOPY, GASTROSCOPY, DUODENOSCOPY (EGD), REMOVE FOREIGN BODY;  Esophagoscopy, Gastroscopy, Duodenoscopy, Removal of Foreign Body;  Surgeon: Dejon Marsh MD;  [facility]     ESOPHAGOSCOPY, GASTROSCOPY, DUODENOSCOPY (EGD), COMBINED N/A 1/6/2018    Procedure: COMBINED ESOPHAGOSCOPY, GASTROSCOPY, DUODENOSCOPY (EGD), REMOVE FOREIGN BODY;  COMBINED ESOPHAGOSCOPY, GASTROSCOPY, DUODENOSCOPY (EGD) [by pascal net and snare with profol sedation;  Surgeon: Feliciano Emmanuel MD;  Location:  GI     ESOPHAGOSCOPY, GASTROSCOPY, DUODENOSCOPY (EGD), COMBINED N/A 3/19/2018    Procedure: COMBINED ESOPHAGOSCOPY, GASTROSCOPY, DUODENOSCOPY (EGD), REMOVE FOREIGN BODY;   Esophagodscopy, Gastroscopy, Duodenoscopy,Foreign Body Removal;  Surgeon: Brice Guzmán MD;  [facility]     ESOPHAGOSCOPY, GASTROSCOPY, DUODENOSCOPY (EGD), COMBINED N/A 4/16/2018    Procedure: COMBINED ESOPHAGOSCOPY, GASTROSCOPY, DUODENOSCOPY (EGD), REMOVE FOREIGN BODY;  Esophagogastroduodenoscopy  Foreign Body Removal X 2;  Surgeon: Royer Moise MD;  [facility]     ESOPHAGOSCOPY, GASTROSCOPY, DUODENOSCOPY (EGD), COMBINED N/A 6/1/2018    Procedure: COMBINED ESOPHAGOSCOPY, GASTROSCOPY, DUODENOSCOPY (EGD), REMOVE FOREIGN BODY;  COMBINED ESOPHAGOSCOPY, GASTROSCOPY, DUODENOSCOPY with removal of foreign body, propofol sedation by anesthesia;  Surgeon: Brice Martinez MD;  [facility]     ESOPHAGOSCOPY, GASTROSCOPY, DUODENOSCOPY (EGD), COMBINED N/A 7/25/2018    Procedure: COMBINED ESOPHAGOSCOPY, GASTROSCOPY, DUODENOSCOPY (EGD), REMOVE FOREIGN BODY;;  Surgeon: Candy Castelan MD;  Location: [facility]     ESOPHAGOSCOPY, GASTROSCOPY, DUODENOSCOPY (EGD), COMBINED N/A 7/28/2018    Procedure:  COMBINED ESOPHAGOSCOPY, GASTROSCOPY, DUODENOSCOPY (EGD), REMOVE FOREIGN BODY;  COMBINED ESOPHAGOSCOPY, GASTROSCOPY, DUODENOSCOPY (EGD), REMOVE FOREIGN BODY;  Surgeon: Brice Guzmán MD;  [facility]     ESOPHAGOSCOPY, GASTROSCOPY, DUODENOSCOPY (EGD), COMBINED N/A 7/31/2018    Procedure: COMBINED ESOPHAGOSCOPY, GASTROSCOPY, DUODENOSCOPY (EGD);  COMBINED ESOPHAGOSCOPY, GASTROSCOPY, DUODENOSCOPY (EGD) TO REMOVE FOREIGN BODY;  Surgeon: Lokesh Paula MD;  [facility]     ESOPHAGOSCOPY, GASTROSCOPY, DUODENOSCOPY (EGD), COMBINED N/A 8/4/2018    Procedure: COMBINED ESOPHAGOSCOPY, GASTROSCOPY, DUODENOSCOPY (EGD), REMOVE FOREIGN BODY;   combined esophagoscopy, gastroscopy, duodenoscopy, REMOVE FOREIGN BODY ;  Surgeon: Lokesh Paula MD;  [facility]     EXAM UNDER ANESTHESIA ANUS N/A 1/10/2017    Procedure: EXAM UNDER ANESTHESIA ANUS;  Surgeon: Annmarie Haynes MD;  [facility]     EXAM UNDER ANESTHESIA RECTUM N/A 7/19/2018    Procedure: EXAM UNDER ANESTHESIA RECTUM;  EXAM UNDER ANESTHESIA, REMOVAL OF RECTAL FOREIGN BODY;  Surgeon: Annmarie Haynes MD;  [facility]     HC REMOVE FECAL IMPACTION OR FB W ANESTHESIA N/A 12/18/2016    Procedure: REMOVE FECAL IMPACTION/FOREIGN BODY UNDER ANESTHESIA;  Surgeon: Soham Cano MD;  [facility]     KNEE SURGERY - removed a small tissue mass from knee Right      LAPAROSCOPIC ABLATION ENDOMETRIOSIS       LAPAROTOMY EXPLORATORY N/A 1/10/2017    Procedure: LAPAROTOMY EXPLORATORY;  Surgeon: Annmarie Haynes MD;  [facility]     lymph nodes removed from neck; benign  age 7     MAMMOPLASTY REDUCTION Bilateral      RELEASE CARPAL TUNNEL Bilateral      SIGMOIDOSCOPY FLEXIBLE N/A 1/10/2017    Procedure: SIGMOIDOSCOPY FLEXIBLE;  Surgeon: Annmarie Haynes MD;  [facility]     SIGMOIDOSCOPY FLEXIBLE N/A 5/8/2018    Procedure: SIGMOIDOSCOPY FLEXIBLE;  flex sig with foreign body removal using snare and rattooth  force;  Surgeon: Soham Cano MD;  Location:  GI     SIGMOIDOSCOPY FLEXIBLE N/A 2/20/2019    Procedure: Exam under anesthesia Colonoscopy with attempted  removal of rectal foreign body;  Surgeon: Estrada Chávez MD;  [facility]             Social History:     Social History     Tobacco Use     Smoking status: Never Smoker     Smokeless tobacco: Never Used   Substance Use Topics     Alcohol use: No     Alcohol/week: 0.0 standard drinks             Family History:     Family History   Problem Relation Age of Onset     Diabetes Type 2  Maternal Grandmother      Diabetes Type 2  Paternal Grandmother      Breast Cancer Paternal Grandmother      Cerebrovascular Disease Father 53     Myocardial Infarction No family hx of      Coronary Artery Disease Early Onset No family hx of      Ovarian Cancer No family hx of      Colon Cancer No family hx of                 Allergies:     Allergies   Allergen Reactions     Amoxicillin-Pot Clavulanate Other (See Comments), Rash and Swelling     PN: facial swelling, left side. Also had cortisone injection the same day as she started the Augmentin.  PN: facial swelling, left side. Also had cortisone injection the same day as she started the Augmentin.  Noted in downtime recovery of chart.       Penicillins Anaphylaxis     Blood-Group Specific Substance Other (See Comments)     Patient has an anti-Cw and non-specific antibodies. Blood product orders may be delayed. Draw one red top and two purple top tubes for all type/screen/crossmatch orders.     Hydrocodone Nausea and Vomiting     vomiting for days     Influenza Vaccines Other (See Comments)     Oseltamivir Hives     med stopped, PN: med stopped     Vicodin [Hydrocodone-Acetaminophen] Nausea and Vomiting     Cephalosporins Rash     Lamotrigine Rash     Possibly associated with Lamictal.      Latex Rash             Medications:     No current facility-administered medications on file prior to encounter.   albuterol (PROAIR  HFA) 108 (90 Base) MCG/ACT inhaler, Inhale 2 puffs into the lungs 4 times daily as needed  CLONIDINE HCL PO, Take 0.05 mg by mouth 2 times daily   Desvenlafaxine Succinate (PRISTIQ PO), Take 100 mg by mouth every morning   gabapentin (NEURONTIN) 100 MG capsule, Take 300 mg by mouth 2 times daily  lurasidone (LATUDA) 60 MG TABS tablet, Take 60 mg by mouth At Bedtime              Review of Systems:   10-point ROS otherwise negative except as noted above.          Physical Exam:     Temp:  [99.3  F (37.4  C)] 99.3  F (37.4  C)  Heart Rate:  [87] 87  Resp:  [16] 16  BP: (116)/(84) 116/84  SpO2:  [96 %] 96 %     General: AAOx4, NAD, lying comfortably in bed  CV: regular rate and rhythm, warm, well-perfused;  Pulm: no dyspnea, breathing comfortably on RA; no wheezing, no rhonchi  Abd: soft, non-distended, epigastric tenderness; midline laparotomy incision c/d/i with steristrips in place; abdominal binder in place  Extremities: no edema  Neuro: moving all extremities spontaneously without apparent deficit  Psych: highly anxious; cooperative    No intake/output data recorded.          Data:   Labs:  Arterial Blood Gases   No lab results found in last 7 days.     Complete Blood Count   Recent Labs   Lab 10/06/19  2300   WBC 8.2   HGB 12.1          Basic Metabolic Panel  Recent Labs   Lab 10/06/19  2300      POTASSIUM 3.4   CHLORIDE 110*   CO2 24   BUN 9   CR 0.60   *   KELBY 8.5       Liver Function Tests  Recent Labs   Lab 10/06/19  2300   AST PENDING   ALT PENDING   ALKPHOS PENDING   BILITOTAL PENDING   ALBUMIN PENDING       Pancreatic Enzymes  No lab results found in last 7 days.    Coagulation Profile  No lab results found in last 7 days.    Lactate  No lab results found in last 7 days.    Imaging:   Results for orders placed or performed during the hospital encounter of 02/24/19   Abdomen XR, 2 vw, flat and upright    Narrative    XR ABDOMEN 2 VW   2/24/2019 8:12 PM      HISTORY:  forgein body in  rectum, rule out other forgein bodies    COMPARISON: Film dated 2/20/2018    FINDINGS: The gas pattern is normal. No free air. There is a rounded  radiopaque foreign body projected over the upper pelvis. This measures  about 4 cm in diameter. This appears to represent the same foreign  body seen on the film of 2/20/2019. It is now slightly more distal in  the sigmoid colon. An IUD is also seen projected over the upper  pelvis.    PELON MELÉNDEZ MD

## 2019-10-07 NOTE — ANESTHESIA PREPROCEDURE EVALUATION
Anesthesia Pre-Procedure Evaluation    Patient: Nevin Alvarado   MRN:     [id number] Gender:   female   Age:    28 year old :      1990        Preoperative Diagnosis: Reetal foreign body   Procedure(s):  Exam unde anesthesia Flexible sigmoidscopy possible eploratory laparotomy     Past Medical History:   Diagnosis Date     ADD (attention deficit disorder)      Anorexia nervosa with bulimia     history of; on Topamax     Anxiety      Borderline personality disorder (H)      Depression      Depressive disorder      H/O self-harm      Lives in independent group home     due to debilitating mental illness     Migraine without aura     no known triggers; on Topamax bid and Imitrex PRN     Morbid obesity (H)      PTSD (post-traumatic stress disorder)      Rectal foreign body - Recurrent issue, self placed      Swallowed foreign body - Recurrent issue, self placed       Past Surgical History:   Procedure Laterality Date     ESOPHAGOSCOPY, GASTROSCOPY, DUODENOSCOPY (EGD), COMBINED N/A 3/9/2017    Procedure: COMBINED ESOPHAGOSCOPY, GASTROSCOPY, DUODENOSCOPY (EGD), REMOVE FOREIGN BODY;  Surgeon: Avis Guzmán MD;  [facility]     ESOPHAGOSCOPY, GASTROSCOPY, DUODENOSCOPY (EGD), COMBINED N/A 2017    Procedure: COMBINED ESOPHAGOSCOPY, GASTROSCOPY, DUODENOSCOPY (EGD), REMOVE FOREIGN BODY;  EGD removal Foregin body;  Surgeon: Lokesh Paula MD;  [facility]     ESOPHAGOSCOPY, GASTROSCOPY, DUODENOSCOPY (EGD), COMBINED N/A 2017    Procedure: COMBINED ESOPHAGOSCOPY, GASTROSCOPY, DUODENOSCOPY (EGD);  COMBINED ESOPHAGOSCOPY, GASTROSCOPY, DUODENOSCOPY (EGD) [[id number]]attempted removal of foreign body;  Surgeon: Pamela Perez MD;  [facility]     ESOPHAGOSCOPY, GASTROSCOPY, DUODENOSCOPY (EGD), COMBINED N/A 2017    Procedure: COMBINED ESOPHAGOSCOPY, GASTROSCOPY, DUODENOSCOPY (EGD), REMOVE FOREIGN BODY;  Esophagoscopy, Gastroscopy, Duodenoscopy, Removal of  Foreign Body;  Surgeon: Dejon Marsh MD;  [facility]     ESOPHAGOSCOPY, GASTROSCOPY, DUODENOSCOPY (EGD), COMBINED N/A 1/6/2018    Procedure: COMBINED ESOPHAGOSCOPY, GASTROSCOPY, DUODENOSCOPY (EGD), REMOVE FOREIGN BODY;  COMBINED ESOPHAGOSCOPY, GASTROSCOPY, DUODENOSCOPY (EGD) [by pascal net and snare with profol sedation;  Surgeon: Feliciano Emmanuel MD;  [facility]     ESOPHAGOSCOPY, GASTROSCOPY, DUODENOSCOPY (EGD), COMBINED N/A 3/19/2018    Procedure: COMBINED ESOPHAGOSCOPY, GASTROSCOPY, DUODENOSCOPY (EGD), REMOVE FOREIGN BODY;   Esophagodscopy, Gastroscopy, Duodenoscopy,Foreign Body Removal;  Surgeon: Brice Guzmán MD;  [facility]     ESOPHAGOSCOPY, GASTROSCOPY, DUODENOSCOPY (EGD), COMBINED N/A 4/16/2018    Procedure: COMBINED ESOPHAGOSCOPY, GASTROSCOPY, DUODENOSCOPY (EGD), REMOVE FOREIGN BODY;  Esophagogastroduodenoscopy  Foreign Body Removal X 2;  Surgeon: Royer Moise MD;  [facility]     ESOPHAGOSCOPY, GASTROSCOPY, DUODENOSCOPY (EGD), COMBINED N/A 6/1/2018    Procedure: COMBINED ESOPHAGOSCOPY, GASTROSCOPY, DUODENOSCOPY (EGD), REMOVE FOREIGN BODY;  COMBINED ESOPHAGOSCOPY, GASTROSCOPY, DUODENOSCOPY with removal of foreign body, propofol sedation by anesthesia;  Surgeon: Brice Martinez MD;  Location:  [facility]     ESOPHAGOSCOPY, GASTROSCOPY, DUODENOSCOPY (EGD), COMBINED N/A 7/25/2018    Procedure: COMBINED ESOPHAGOSCOPY, GASTROSCOPY, DUODENOSCOPY (EGD), REMOVE FOREIGN BODY;;  Surgeon: Candy Castelan MD;  Location:  [facility]     ESOPHAGOSCOPY, GASTROSCOPY, DUODENOSCOPY (EGD), COMBINED N/A 7/28/2018    Procedure: COMBINED ESOPHAGOSCOPY, GASTROSCOPY, DUODENOSCOPY (EGD), REMOVE FOREIGN BODY;  COMBINED ESOPHAGOSCOPY, GASTROSCOPY, DUODENOSCOPY (EGD), REMOVE FOREIGN BODY;  Surgeon: Brice Guzmán MD;  [facility]     ESOPHAGOSCOPY, GASTROSCOPY, DUODENOSCOPY (EGD), COMBINED N/A 7/31/2018    Procedure: COMBINED ESOPHAGOSCOPY, GASTROSCOPY, DUODENOSCOPY (EGD);   COMBINED ESOPHAGOSCOPY, GASTROSCOPY, DUODENOSCOPY (EGD) TO REMOVE FOREIGN BODY;  Surgeon: Lokesh Paula MD;  [facility]     ESOPHAGOSCOPY, GASTROSCOPY, DUODENOSCOPY (EGD), COMBINED N/A 8/4/2018    Procedure: COMBINED ESOPHAGOSCOPY, GASTROSCOPY, DUODENOSCOPY (EGD), REMOVE FOREIGN BODY;   combined esophagoscopy, gastroscopy, duodenoscopy, REMOVE FOREIGN BODY ;  Surgeon: Lokesh Paula MD;  [facility]     EXAM UNDER ANESTHESIA ANUS N/A 1/10/2017    Procedure: EXAM UNDER ANESTHESIA ANUS;  Surgeon: Annmarie Haynes MD;  [facility]     EXAM UNDER ANESTHESIA RECTUM N/A 7/19/2018    Procedure: EXAM UNDER ANESTHESIA RECTUM;  EXAM UNDER ANESTHESIA, REMOVAL OF RECTAL FOREIGN BODY;  Surgeon: Annmarie Haynes MD;  [facility]     HC REMOVE FECAL IMPACTION OR FB W ANESTHESIA N/A 12/18/2016    Procedure: REMOVE FECAL IMPACTION/FOREIGN BODY UNDER ANESTHESIA;  Surgeon: Soham Cano MD;  [facility]: [facility]     KNEE SURGERY - removed a small tissue mass from knee Right      LAPAROSCOPIC ABLATION ENDOMETRIOSIS       LAPAROTOMY EXPLORATORY N/A 1/10/2017    Procedure: LAPAROTOMY EXPLORATORY;  Surgeon: Annmarie Haynes MD;  [facility]     lymph nodes removed from neck; benign  age 7     MAMMOPLASTY REDUCTION Bilateral      RELEASE CARPAL TUNNEL Bilateral      SIGMOIDOSCOPY FLEXIBLE N/A 1/10/2017    Procedure: SIGMOIDOSCOPY FLEXIBLE;  Surgeon: Annmarie Haynes MD;  [facility]     SIGMOIDOSCOPY FLEXIBLE N/A 5/8/2018    Procedure: SIGMOIDOSCOPY FLEXIBLE;  flex sig with foreign body removal using snare and rattooth forcep;  Surgeon: Soham Cano MD;  Location:  [facility]     SIGMOIDOSCOPY FLEXIBLE N/A 2/20/2019    Procedure: Exam under anesthesia Colonoscopy with attempted  removal of rectal foreign body;  Surgeon: Estrada Chávez MD;  [facility]          Anesthesia Evaluation     . Pt has had prior anesthetic. Type: General and MAC           ROS/MED  HX    ENT/Pulmonary:  - neg pulmonary ROS     Neurologic:  - neg neurologic ROS   (+)migraines,     Cardiovascular:     (+) ----. : . . fainting (syncope). :. .       METS/Exercise Tolerance:  >4 METS   Hematologic:         Musculoskeletal:         GI/Hepatic:         Renal/Genitourinary:  - ROS Renal section negative       Endo:  - neg endo ROS   (+) Obesity, .      Psychiatric:     (+) psychiatric history anxiety and depression      Infectious Disease:  - neg infectious disease ROS       Malignancy:      - no malignancy   Other:    (+) No chance of pregnancy C-spine cleared: N/A, no H/O Chronic Pain,no other significant disability   - neg other ROS                     PHYSICAL EXAM:   Mental Status/Neuro: A/A/O   Airway: Facies: Feasible  Mallampati: II  Mouth/Opening: Full  TM distance: > 6 cm  Neck ROM: Full   Respiratory: Auscultation: CTAB     Resp. Rate: Normal     Resp. Effort: Normal      CV: Rhythm: Regular  Rate: Age appropriate  Heart: Normal Sounds   Comments:                      Lab Results   Component Value Date    WBC 8.2 10/06/2019    HGB 12.1 10/06/2019    HCT 37.8 10/06/2019     10/06/2019    CRP 15.7 (H) 10/19/2014    SED 26 (H) 07/11/2017     10/06/2019    POTASSIUM 3.4 10/06/2019    CHLORIDE 110 (H) 10/06/2019    CO2 24 10/06/2019    BUN 9 10/06/2019    CR 0.60 10/06/2019     (H) 10/06/2019    KELBY 8.5 10/06/2019    PHOS 4.1 02/25/2019    MAG 2.1 02/25/2019    ALBUMIN 3.3 (L) 10/06/2019    PROTTOTAL 8.0 10/06/2019    ALT 23 10/06/2019    AST 16 10/06/2019    ALKPHOS 74 10/06/2019    BILITOTAL 0.2 10/06/2019    LIPASE 128 07/16/2018    PTT 31 10/06/2019    INR 1.16 (H) 10/06/2019    TSH 0.66 03/21/2018    T4 0.96 05/16/2017    HCG Negative 02/20/2019    HCGS Negative 12/22/2017       Preop Vitals  BP Readings from Last 3 Encounters:   10/06/19 116/84   02/25/19 142/78   02/21/19 122/78    Pulse Readings from Last 3 Encounters:   02/25/19 91   02/21/19 72   08/04/18 70     "  Resp Readings from Last 3 Encounters:   10/06/19 16   02/25/19 18   02/21/19 18    SpO2 Readings from Last 3 Encounters:   10/06/19 96%   02/25/19 98%   02/21/19 95%      Temp Readings from Last 1 Encounters:   10/06/19 37.4  C (99.3  F) (Oral)    Ht Readings from Last 1 Encounters:   10/06/19 1.575 m (5' 2\")      Wt Readings from Last 1 Encounters:   10/06/19 121.6 kg (268 lb)    Estimated body mass index is 49.02 kg/m  as calculated from the following:    Height as of an earlier encounter on 10/6/19: 1.575 m (5' 2\").    Weight as of an earlier encounter on 10/6/19: 121.6 kg (268 lb).     LDA:            Assessment: Elective due to   ASA SCORE: 3 emergent   H&P: History and physical reviewed and following examination; no interval change.        - H&P complete; Preop Testing complete  Smoking Status:  NO Active use of Tobacco/UNKNOWN Tobacco use status   NPO Status: > 6 hours since completed [employer]     Plan:   Anes. Type:  General (General)   Pre-Medication: None   Induction:  IV (RSI)   Airway: ETT; Oral   Access/Monitoring: PIV; 2nd PIV; A-Line   Maintenance: Balanced     Postop Plan:   Postop Pain: Opioids  Postop Sedation/Airway: Not planned  Disposition: Inpatient/Admit     PONV Management:   Adult Risk Factors: Female, Non-Smoker, Postop Opioids   Prevention: Ondansetron, Dexamethasone     CONSENT: Direct conversation   Plan and risks discussed with: Patient   Blood Products: Consented (ALL Blood Products)       Comments for Plan/Consent:  ______________________________________________________________________  I discussed the risks and benefits of general anesthesia with the patient.  Questions were sought and answered.      Wilbur Castle  Attending Anesthesiologist                             Alley Castle MD  "

## 2019-10-08 ENCOUNTER — APPOINTMENT (OUTPATIENT)
Dept: GENERAL RADIOLOGY | Facility: CLINIC | Age: 29
DRG: 909 | End: 2019-10-08
Attending: PHYSICIAN ASSISTANT
Payer: COMMERCIAL

## 2019-10-08 ENCOUNTER — ANESTHESIA EVENT (OUTPATIENT)
Dept: SURGERY | Facility: CLINIC | Age: 29
DRG: 909 | End: 2019-10-08
Payer: COMMERCIAL

## 2019-10-08 ENCOUNTER — ANESTHESIA (OUTPATIENT)
Dept: SURGERY | Facility: CLINIC | Age: 29
DRG: 909 | End: 2019-10-08
Payer: COMMERCIAL

## 2019-10-08 LAB
ANION GAP SERPL CALCULATED.3IONS-SCNC: 7 MMOL/L (ref 3–14)
BUN SERPL-MCNC: 14 MG/DL (ref 7–30)
CALCIUM SERPL-MCNC: 8.6 MG/DL (ref 8.5–10.1)
CHLORIDE SERPL-SCNC: 108 MMOL/L (ref 94–109)
CO2 SERPL-SCNC: 25 MMOL/L (ref 20–32)
CREAT SERPL-MCNC: 0.63 MG/DL (ref 0.52–1.04)
ERYTHROCYTE [DISTWIDTH] IN BLOOD BY AUTOMATED COUNT: 14.7 % (ref 10–15)
GFR SERPL CREATININE-BSD FRML MDRD: >90 ML/MIN/{1.73_M2}
GLUCOSE BLDC GLUCOMTR-MCNC: 90 MG/DL (ref 70–99)
GLUCOSE SERPL-MCNC: 107 MG/DL (ref 70–99)
HCT VFR BLD AUTO: 33.2 % (ref 35–47)
HGB BLD-MCNC: 10.3 G/DL (ref 11.7–15.7)
MCH RBC QN AUTO: 28.3 PG (ref 26.5–33)
MCHC RBC AUTO-ENTMCNC: 31 G/DL (ref 31.5–36.5)
MCV RBC AUTO: 91 FL (ref 78–100)
PLATELET # BLD AUTO: 197 10E9/L (ref 150–450)
POTASSIUM SERPL-SCNC: 3.4 MMOL/L (ref 3.4–5.3)
RBC # BLD AUTO: 3.64 10E12/L (ref 3.8–5.2)
SODIUM SERPL-SCNC: 140 MMOL/L (ref 133–144)
WBC # BLD AUTO: 11.3 10E9/L (ref 4–11)

## 2019-10-08 PROCEDURE — 25000128 H RX IP 250 OP 636: Performed by: SURGERY

## 2019-10-08 PROCEDURE — 25000132 ZZH RX MED GY IP 250 OP 250 PS 637: Performed by: SURGERY

## 2019-10-08 PROCEDURE — 71046 X-RAY EXAM CHEST 2 VIEWS: CPT

## 2019-10-08 PROCEDURE — 25000128 H RX IP 250 OP 636: Performed by: STUDENT IN AN ORGANIZED HEALTH CARE EDUCATION/TRAINING PROGRAM

## 2019-10-08 PROCEDURE — 85027 COMPLETE CBC AUTOMATED: CPT | Performed by: STUDENT IN AN ORGANIZED HEALTH CARE EDUCATION/TRAINING PROGRAM

## 2019-10-08 PROCEDURE — 00000146 ZZHCL STATISTIC GLUCOSE BY METER IP

## 2019-10-08 PROCEDURE — 25000125 ZZHC RX 250: Performed by: STUDENT IN AN ORGANIZED HEALTH CARE EDUCATION/TRAINING PROGRAM

## 2019-10-08 PROCEDURE — 25000128 H RX IP 250 OP 636: Performed by: ANESTHESIOLOGY

## 2019-10-08 PROCEDURE — 80048 BASIC METABOLIC PNL TOTAL CA: CPT | Performed by: STUDENT IN AN ORGANIZED HEALTH CARE EDUCATION/TRAINING PROGRAM

## 2019-10-08 PROCEDURE — 25800030 ZZH RX IP 258 OP 636: Performed by: STUDENT IN AN ORGANIZED HEALTH CARE EDUCATION/TRAINING PROGRAM

## 2019-10-08 PROCEDURE — 36592 COLLECT BLOOD FROM PICC: CPT | Performed by: STUDENT IN AN ORGANIZED HEALTH CARE EDUCATION/TRAINING PROGRAM

## 2019-10-08 PROCEDURE — 12000001 ZZH R&B MED SURG/OB UMMC

## 2019-10-08 PROCEDURE — C9113 INJ PANTOPRAZOLE SODIUM, VIA: HCPCS | Performed by: SURGERY

## 2019-10-08 PROCEDURE — 25000132 ZZH RX MED GY IP 250 OP 250 PS 637: Performed by: STUDENT IN AN ORGANIZED HEALTH CARE EDUCATION/TRAINING PROGRAM

## 2019-10-08 RX ORDER — LIDOCAINE 40 MG/G
CREAM TOPICAL
Status: DISCONTINUED | OUTPATIENT
Start: 2019-10-08 | End: 2019-10-08 | Stop reason: HOSPADM

## 2019-10-08 RX ORDER — HYDROMORPHONE HYDROCHLORIDE 1 MG/ML
0.3 INJECTION, SOLUTION INTRAMUSCULAR; INTRAVENOUS; SUBCUTANEOUS
Status: COMPLETED | OUTPATIENT
Start: 2019-10-08 | End: 2019-10-08

## 2019-10-08 RX ORDER — SODIUM CHLORIDE, SODIUM LACTATE, POTASSIUM CHLORIDE, CALCIUM CHLORIDE 600; 310; 30; 20 MG/100ML; MG/100ML; MG/100ML; MG/100ML
INJECTION, SOLUTION INTRAVENOUS CONTINUOUS
Status: DISCONTINUED | OUTPATIENT
Start: 2019-10-08 | End: 2019-10-08 | Stop reason: HOSPADM

## 2019-10-08 RX ORDER — ONDANSETRON 2 MG/ML
4 INJECTION INTRAMUSCULAR; INTRAVENOUS EVERY 6 HOURS PRN
Status: DISCONTINUED | OUTPATIENT
Start: 2019-10-08 | End: 2019-10-09

## 2019-10-08 RX ORDER — LEVOFLOXACIN 5 MG/ML
500 INJECTION, SOLUTION INTRAVENOUS EVERY 24 HOURS
Status: DISCONTINUED | OUTPATIENT
Start: 2019-10-08 | End: 2019-10-10

## 2019-10-08 RX ADMIN — MORPHINE SULFATE 2 MG: 2 INJECTION, SOLUTION INTRAMUSCULAR; INTRAVENOUS at 02:16

## 2019-10-08 RX ADMIN — SODIUM CHLORIDE, POTASSIUM CHLORIDE, SODIUM LACTATE AND CALCIUM CHLORIDE: 600; 310; 30; 20 INJECTION, SOLUTION INTRAVENOUS at 02:24

## 2019-10-08 RX ADMIN — CLINDAMYCIN IN 5 PERCENT DEXTROSE 600 MG: 12 INJECTION, SOLUTION INTRAVENOUS at 04:24

## 2019-10-08 RX ADMIN — HYDROMORPHONE HYDROCHLORIDE 0.3 MG: 1 INJECTION, SOLUTION INTRAMUSCULAR; INTRAVENOUS; SUBCUTANEOUS at 18:22

## 2019-10-08 RX ADMIN — OXYCODONE HYDROCHLORIDE 5 MG: 5 SOLUTION ORAL at 04:24

## 2019-10-08 RX ADMIN — MORPHINE SULFATE 2 MG: 2 INJECTION, SOLUTION INTRAMUSCULAR; INTRAVENOUS at 13:47

## 2019-10-08 RX ADMIN — PANTOPRAZOLE SODIUM 40 MG: 40 INJECTION, POWDER, FOR SOLUTION INTRAVENOUS at 08:49

## 2019-10-08 RX ADMIN — OXYCODONE HYDROCHLORIDE 5 MG: 5 SOLUTION ORAL at 08:49

## 2019-10-08 RX ADMIN — MORPHINE SULFATE 2 MG: 2 INJECTION, SOLUTION INTRAMUSCULAR; INTRAVENOUS at 19:37

## 2019-10-08 RX ADMIN — ONDANSETRON HYDROCHLORIDE 4 MG: 2 INJECTION, SOLUTION INTRAMUSCULAR; INTRAVENOUS at 02:43

## 2019-10-08 RX ADMIN — MORPHINE SULFATE 2 MG: 2 INJECTION, SOLUTION INTRAMUSCULAR; INTRAVENOUS at 07:44

## 2019-10-08 RX ADMIN — KETOROLAC TROMETHAMINE 15 MG: 15 INJECTION, SOLUTION INTRAMUSCULAR; INTRAVENOUS at 21:15

## 2019-10-08 RX ADMIN — LEVOFLOXACIN 500 MG: 5 INJECTION, SOLUTION INTRAVENOUS at 10:26

## 2019-10-08 RX ADMIN — FLUCONAZOLE IN SODIUM CHLORIDE 400 MG: 2 INJECTION, SOLUTION INTRAVENOUS at 08:49

## 2019-10-08 RX ADMIN — MORPHINE SULFATE 2 MG: 2 INJECTION, SOLUTION INTRAMUSCULAR; INTRAVENOUS at 22:16

## 2019-10-08 RX ADMIN — MORPHINE SULFATE 2 MG: 2 INJECTION, SOLUTION INTRAMUSCULAR; INTRAVENOUS at 04:34

## 2019-10-08 RX ADMIN — ONDANSETRON HYDROCHLORIDE 4 MG: 2 INJECTION, SOLUTION INTRAMUSCULAR; INTRAVENOUS at 12:56

## 2019-10-08 RX ADMIN — MENTHOL 1 PATCH: 205.5 PATCH TOPICAL at 20:16

## 2019-10-08 RX ADMIN — CLINDAMYCIN IN 5 PERCENT DEXTROSE 600 MG: 12 INJECTION, SOLUTION INTRAVENOUS at 19:37

## 2019-10-08 RX ADMIN — CLINDAMYCIN IN 5 PERCENT DEXTROSE 600 MG: 12 INJECTION, SOLUTION INTRAVENOUS at 13:09

## 2019-10-08 RX ADMIN — MORPHINE SULFATE 2 MG: 2 INJECTION, SOLUTION INTRAMUSCULAR; INTRAVENOUS at 10:33

## 2019-10-08 RX ADMIN — OXYCODONE HYDROCHLORIDE 5 MG: 5 SOLUTION ORAL at 20:16

## 2019-10-08 RX ADMIN — DESVENLAFAXINE 100 MG: 50 TABLET, FILM COATED, EXTENDED RELEASE ORAL at 08:48

## 2019-10-08 RX ADMIN — KETOROLAC TROMETHAMINE 15 MG: 15 INJECTION, SOLUTION INTRAMUSCULAR; INTRAVENOUS at 13:09

## 2019-10-08 RX ADMIN — ONDANSETRON HYDROCHLORIDE 4 MG: 2 INJECTION, SOLUTION INTRAMUSCULAR; INTRAVENOUS at 16:38

## 2019-10-08 RX ADMIN — OXYCODONE HYDROCHLORIDE 5 MG: 5 SOLUTION ORAL at 12:56

## 2019-10-08 RX ADMIN — GABAPENTIN 300 MG: 300 CAPSULE ORAL at 08:49

## 2019-10-08 RX ADMIN — KETOROLAC TROMETHAMINE 15 MG: 15 INJECTION, SOLUTION INTRAMUSCULAR; INTRAVENOUS at 06:31

## 2019-10-08 ASSESSMENT — PAIN DESCRIPTION - DESCRIPTORS
DESCRIPTORS: ACHING

## 2019-10-08 ASSESSMENT — ACTIVITIES OF DAILY LIVING (ADL)
ADLS_ACUITY_SCORE: 10
ADLS_ACUITY_SCORE: 10
ADLS_ACUITY_SCORE: 12
ADLS_ACUITY_SCORE: 10
ADLS_ACUITY_SCORE: 12

## 2019-10-08 NOTE — PROGRESS NOTES
Social Work Services Progress Note    Hospital Day: 3  Date of Initial Social Work Evaluation: n/a  Collaborated with:  Chart review, pt, volunteer services     Data:  Pt is a 28 year old female with history notable for chronic abdominal pain and self-injurious behavior (ingestion of foreign bodies, placing objects into her rectum that require surgical extractions, and overdoses), depressive disorder, anxiety, and borderline personality disorder who was transferred from [facility] for swallowed foreign body.     SW consulting with pt to discuss community supports.    Intervention:  SW met with pt and provided introductions. SW stated that they were there to discuss supports in the community upon discharge. Pt has many supports in place:    CADI CM: Hollie De La Rosa through [facility] (pt stated that a release was signed between Hollie and  of  for communication)  ILS Worker: Through [facility], 2x a week from 7am-5pm   Mental Health : Roya Marrufo through [facility]  Behavioral Analyst Therapist: through [employer], though her CADI CM is seeking another group to provide services for pt.     Pt feels that she is well supported in the community. Pt says that her CADI CM is 'awesome' and works well to get her connected to services.    Pt had concerns surrounding a liquid diet upon discharge. Pt is unsure if she can afford to purchase the supplements needed. SW updated dietician, dietician to meet with pt to discuss liquid diet, provide coupons, and additional resources.     Pt also requested a visit from the therapy dog. SW called volunteer services [phone] to request the hours for the therapy dog. The volunteer services representative collect pt's room number and will share with the  on [employer] [place] today.    Assessment:  Pt has sufficient mental health support in community. Pt feels that her providers work well and support her. Pt appeared anxious during visit, requested therapy dog  services. SW answered questions pt had, provided support.    Plan:    Anticipated Disposition:  Home with services    Barriers to d/c plan:  Medical complexities     Follow Up:  Pt to Follow-up with community supports upon discharge.    Shahrzad Mccoy ALLAN  [facility]   Covering for Unit: [phone]

## 2019-10-08 NOTE — PLAN OF CARE
"Reason for Admission: Foreign body ingestion    Vs: BP (!) 146/44 (BP Location: Right arm)   Pulse 95   Temp 96.9  F (36.1  C) (Oral)   Resp 18   Ht 1.575 m (5' 2\")   Wt 121.6 kg (268 lb)   SpO2 97%   Breastfeeding? No   BMI 49.02 kg/m      Activity: Standby assist - sitter at bedside.     Neuro: Alert & oriented x4. Anxious.     Cardiac: S1, S2. Regular rate and rhythm. Denies chest pain.    Respiratory: Lung sounds clear and equal bilaterally. 3 L/min NC - humidified.     GI/: Voiding spontaneously without difficulty. BS hypoactive. Last BM before admission. Intermittent nausea, PRN zofran given with relief.     Skin: Warm, dry.     Diet: NPO except for meds.    Lines/Drains/Wounds: Midline incision from previous hospitalization closed with steri strips, no drainage present. R Larissa cath infusing LR @ 100 mL/hr in between antibiotics.     Labs: Lactic = 2.0 on day shift, WBC = 21.2    Pain: Chest, back, and abdominal pain. PRN morphine given ~ Q2H.     Plan: GI to place stiches around stent today.     "

## 2019-10-08 NOTE — PLAN OF CARE
"/77 (BP Location: Right arm)   Pulse 95   Temp 99  F (37.2  C) (Oral)   Resp 16   Ht 1.575 m (5' 2\")   Wt 121.6 kg (268 lb)   SpO2 99%   Breastfeeding? No   BMI 49.02 kg/m      Neuro: A&Ox4, pt reports no suicidal ideation, 1:1 attendant at bedside for safety.   Cardiac: WDL.  Resp: Lung sounds clear/diminished, shallow breaths d/t pain, no SOB reported. O2 weaned down to 3 to 4 L. O2 sats 99% on 3 L.  GI: + hypo. Denies N/V.  : Voiding spontaneously in adequate amounts. Missed hat.  Skin: Midline incision from previous surgery intact with steri-strips. Abdominal binder pain place.  Pain/Nausea: c/o back, chest, and throat pain with liquid oxycodone, toradol, and prn IV morphine with some relief.  LDA: R chest port infusing LR at 100 ml/hr/. IV abx given. C/o itchiness under port site. Port dressing changed with hypoallergenic dressing.  Diet: NPO.  Mobility: SBA, pt ambulated to bathroom and up in guerra with attendant.  New changes: c/o generalized itchiness. Denies any respiratory symptoms. One time dose of Benadryl given.  Labs: Lactic acid 2.0 this morning. Vitals taken x3 this shift and have been stable.  Plan: EGD to have sutures placed around stent per GI possibly tomorrow. Bedside attendant for safety,            "

## 2019-10-08 NOTE — ANESTHESIA PREPROCEDURE EVALUATION
Anesthesia Pre-Procedure Evaluation    Patient: Nevin Alvarado   MRN:     [id number] Gender:   female   Age:    28 year old :      1990        Preoperative Diagnosis: Esophageal rupture [K22.3]   Procedure(s):  ESOPHAGOGASTRODUODENOSCOPY (EGD), Suture Esophageal Stent     Past Medical History:   Diagnosis Date     ADD (attention deficit disorder)      Anorexia nervosa with bulimia     history of; on Topamax     Anxiety      Borderline personality disorder (H)      Depression      Depressive disorder      H/O self-harm      Lives in independent group home     due to debilitating mental illness     Migraine without aura     no known triggers; on Topamax bid and Imitrex PRN     Morbid obesity (H)      PTSD (post-traumatic stress disorder)      Rectal foreign body - Recurrent issue, self placed      Swallowed foreign body - Recurrent issue, self placed       Past Surgical History:   Procedure Laterality Date     ESOPHAGOSCOPY, GASTROSCOPY, DUODENOSCOPY (EGD), COMBINED N/A 3/9/2017    Procedure: COMBINED ESOPHAGOSCOPY, GASTROSCOPY, DUODENOSCOPY (EGD), REMOVE FOREIGN BODY;  Surgeon: Avis Guzmán MD;  Location: [facility]     ESOPHAGOSCOPY, GASTROSCOPY, DUODENOSCOPY (EGD), COMBINED N/A 2017    Procedure: COMBINED ESOPHAGOSCOPY, GASTROSCOPY, DUODENOSCOPY (EGD), REMOVE FOREIGN BODY;  EGD removal Foregin body;  Surgeon: Lokesh Paula MD;  Location: [facility]     ESOPHAGOSCOPY, GASTROSCOPY, DUODENOSCOPY (EGD), COMBINED N/A 2017    Procedure: COMBINED ESOPHAGOSCOPY, GASTROSCOPY, DUODENOSCOPY (EGD);  COMBINED ESOPHAGOSCOPY, GASTROSCOPY, DUODENOSCOPY (EGD) [[id number]]attempted removal of foreign body;  Surgeon: Pamela Perez MD;  Location: [facility]     ESOPHAGOSCOPY, GASTROSCOPY, DUODENOSCOPY (EGD), COMBINED N/A 2017    Procedure: COMBINED ESOPHAGOSCOPY, GASTROSCOPY, DUODENOSCOPY (EGD), REMOVE FOREIGN BODY;  Esophagoscopy, Gastroscopy, Duodenoscopy, Removal of Foreign Body;   Surgeon: Dejon Marsh MD;  Location: [facility]     ESOPHAGOSCOPY, GASTROSCOPY, DUODENOSCOPY (EGD), COMBINED N/A 1/6/2018    Procedure: COMBINED ESOPHAGOSCOPY, GASTROSCOPY, DUODENOSCOPY (EGD), REMOVE FOREIGN BODY;  COMBINED ESOPHAGOSCOPY, GASTROSCOPY, DUODENOSCOPY (EGD) [by pascal net and snare with profol sedation;  Surgeon: Feliciano Emmanuel MD;  Location:  GI     ESOPHAGOSCOPY, GASTROSCOPY, DUODENOSCOPY (EGD), COMBINED N/A 3/19/2018    Procedure: COMBINED ESOPHAGOSCOPY, GASTROSCOPY, DUODENOSCOPY (EGD), REMOVE FOREIGN BODY;   Esophagodscopy, Gastroscopy, Duodenoscopy,Foreign Body Removal;  Surgeon: Brice Guzmán MD;  Location: [facility]     ESOPHAGOSCOPY, GASTROSCOPY, DUODENOSCOPY (EGD), COMBINED N/A 4/16/2018    Procedure: COMBINED ESOPHAGOSCOPY, GASTROSCOPY, DUODENOSCOPY (EGD), REMOVE FOREIGN BODY;  Esophagogastroduodenoscopy  Foreign Body Removal X 2;  Surgeon: Royer Moise MD;  Location: [facility]     ESOPHAGOSCOPY, GASTROSCOPY, DUODENOSCOPY (EGD), COMBINED N/A 6/1/2018    Procedure: COMBINED ESOPHAGOSCOPY, GASTROSCOPY, DUODENOSCOPY (EGD), REMOVE FOREIGN BODY;  COMBINED ESOPHAGOSCOPY, GASTROSCOPY, DUODENOSCOPY with removal of foreign body, propofol sedation by anesthesia;  Surgeon: Brice Martinez MD;  Location:  GI     ESOPHAGOSCOPY, GASTROSCOPY, DUODENOSCOPY (EGD), COMBINED N/A 7/25/2018    Procedure: COMBINED ESOPHAGOSCOPY, GASTROSCOPY, DUODENOSCOPY (EGD), REMOVE FOREIGN BODY;;  Surgeon: Candy Castelan MD;  Location:  GI     ESOPHAGOSCOPY, GASTROSCOPY, DUODENOSCOPY (EGD), COMBINED N/A 7/28/2018    Procedure: COMBINED ESOPHAGOSCOPY, GASTROSCOPY, DUODENOSCOPY (EGD), REMOVE FOREIGN BODY;  COMBINED ESOPHAGOSCOPY, GASTROSCOPY, DUODENOSCOPY (EGD), REMOVE FOREIGN BODY;  Surgeon: Brice Guzmán MD;  Location: [facility]     ESOPHAGOSCOPY, GASTROSCOPY, DUODENOSCOPY (EGD), COMBINED N/A 7/31/2018    Procedure: COMBINED ESOPHAGOSCOPY, GASTROSCOPY, DUODENOSCOPY (EGD);  COMBINED  ESOPHAGOSCOPY, GASTROSCOPY, DUODENOSCOPY (EGD) TO REMOVE FOREIGN BODY;  Surgeon: Lokesh Paula MD;  Location: [facility]     ESOPHAGOSCOPY, GASTROSCOPY, DUODENOSCOPY (EGD), COMBINED N/A 8/4/2018    Procedure: COMBINED ESOPHAGOSCOPY, GASTROSCOPY, DUODENOSCOPY (EGD), REMOVE FOREIGN BODY;   combined esophagoscopy, gastroscopy, duodenoscopy, REMOVE FOREIGN BODY ;  Surgeon: Lokesh Paula MD;  Location: [facility]     ESOPHAGOSCOPY, GASTROSCOPY, DUODENOSCOPY (EGD), COMBINED N/A 10/6/2019    Procedure: ESOPHAGOGASTRODUODENOSCOPY (EGD) with fireign body removal x2, esophageal stent placement with floroscopy;  Surgeon: Timoteo Espana MD;  Location: [facility]     EXAM UNDER ANESTHESIA ANUS N/A 1/10/2017    Procedure: EXAM UNDER ANESTHESIA ANUS;  Surgeon: Annmarie Haynes MD;  Location: [facility]     EXAM UNDER ANESTHESIA RECTUM N/A 7/19/2018    Procedure: EXAM UNDER ANESTHESIA RECTUM;  EXAM UNDER ANESTHESIA, REMOVAL OF RECTAL FOREIGN BODY;  Surgeon: Annmarie Haynes MD;  Location: [facility]     HC REMOVE FECAL IMPACTION OR FB W ANESTHESIA N/A 12/18/2016    Procedure: REMOVE FECAL IMPACTION/FOREIGN BODY UNDER ANESTHESIA;  Surgeon: Soham Cano MD;  Location: [facility]     KNEE SURGERY - removed a small tissue mass from knee Right      LAPAROSCOPIC ABLATION ENDOMETRIOSIS       LAPAROTOMY EXPLORATORY N/A 1/10/2017    Procedure: LAPAROTOMY EXPLORATORY;  Surgeon: Annmarie Haynes MD;  Location: [facility]     lymph nodes removed from neck; benign  age 7     MAMMOPLASTY REDUCTION Bilateral      RELEASE CARPAL TUNNEL Bilateral      SIGMOIDOSCOPY FLEXIBLE N/A 1/10/2017    Procedure: SIGMOIDOSCOPY FLEXIBLE;  Surgeon: Annmarie Haynes MD;  Location: [facility]     SIGMOIDOSCOPY FLEXIBLE N/A 5/8/2018    Procedure: SIGMOIDOSCOPY FLEXIBLE;  flex sig with foreign body removal using snare and rattooth forcep;  Surgeon: Soham Cano MD;  Location: [facility]     SIGMOIDOSCOPY FLEXIBLE N/A 2/20/2019    Procedure: Exam  under anesthesia Colonoscopy with attempted  removal of rectal foreign body;  Surgeon: Estrada Chávez MD;  Location: [facility]          Anesthesia Evaluation     . Pt has had prior anesthetic. Type: General and MAC           ROS/MED HX    ENT/Pulmonary:  - neg pulmonary ROS     Neurologic:  - neg neurologic ROS   (+)migraines,     Cardiovascular:     (+) ----. : . . fainting (syncope). :. .       METS/Exercise Tolerance:  >4 METS   Hematologic:         Musculoskeletal:         GI/Hepatic:         Renal/Genitourinary:  - ROS Renal section negative       Endo:  - neg endo ROS   (+) Obesity, .      Psychiatric:     (+) psychiatric history anxiety and depression      Infectious Disease:  - neg infectious disease ROS       Malignancy:      - no malignancy   Other:    (+) No chance of pregnancy C-spine cleared: N/A, no H/O Chronic Pain,no other significant disability   - neg other ROS                     PHYSICAL EXAM:   Mental Status/Neuro: A/A/O   Airway:   Mallampati: II  Mouth/Opening: Full  TM distance: > 6 cm  Neck ROM: Full   Respiratory: Auscultation: CTAB     Resp. Rate: Normal     Resp. Effort: Normal      CV: Rhythm: Regular  Rate: Age appropriate  Heart: Normal Sounds  Edema: None   Comments:      Dental: Normal Dentition                LABS:  CBC:   Lab Results   Component Value Date    WBC 11.3 (H) 10/08/2019    WBC 21.2 (H) 10/07/2019    HGB 10.3 (L) 10/08/2019    HGB 12.7 10/07/2019    HCT 33.2 (L) 10/08/2019    HCT 40.3 10/07/2019     10/08/2019     10/07/2019     BMP:   Lab Results   Component Value Date     10/08/2019     10/07/2019    POTASSIUM 3.4 10/08/2019    POTASSIUM 4.0 10/07/2019    CHLORIDE 108 10/08/2019    CHLORIDE 108 10/07/2019    CO2 25 10/08/2019    CO2 20 10/07/2019    BUN 14 10/08/2019    BUN 10 10/07/2019    CR 0.63 10/08/2019    CR 0.64 10/07/2019     (H) 10/08/2019     (H) 10/07/2019     COAGS:   Lab Results   Component Value Date    PTT 31  "10/06/2019    INR 1.16 (H) 10/06/2019     POC:   Lab Results   Component Value Date    BGM 90 10/08/2019    HCG Negative 02/20/2019    HCGS Negative 12/22/2017     OTHER:   Lab Results   Component Value Date    LACT 2.0 10/07/2019    KELBY 8.6 10/08/2019    PHOS 4.1 02/25/2019    MAG 2.1 02/25/2019    ALBUMIN 3.3 (L) 10/06/2019    PROTTOTAL 8.0 10/06/2019    ALT 23 10/06/2019    AST 16 10/06/2019    ALKPHOS 74 10/06/2019    BILITOTAL 0.2 10/06/2019    LIPASE 128 07/16/2018    TSH 0.66 03/21/2018    T4 0.96 05/16/2017    CRP 15.7 (H) 10/19/2014    SED 26 (H) 07/11/2017        Preop Vitals    BP Readings from Last 3 Encounters:   10/08/19 (!) 143/99   02/25/19 142/78   02/21/19 122/78    Pulse Readings from Last 3 Encounters:   10/08/19 82   02/25/19 91   02/21/19 72      Resp Readings from Last 3 Encounters:   10/08/19 18   02/25/19 18   02/21/19 18    SpO2 Readings from Last 3 Encounters:   10/08/19 96%   02/25/19 98%   02/21/19 95%      Temp Readings from Last 1 Encounters:   10/08/19 36.9  C (98.4  F) (Oral)    Ht Readings from Last 1 Encounters:   10/06/19 1.575 m (5' 2\")      Wt Readings from Last 1 Encounters:   10/06/19 121.6 kg (268 lb)    Estimated body mass index is 49.02 kg/m  as calculated from the following:    Height as of this encounter: 1.575 m (5' 2\").    Weight as of this encounter: 121.6 kg (268 lb).     LDA:  Port A Cath Single 10/07/19 Right Chest wall (Active)   Access Date 10/06/19 10/8/2019  7:00 AM   Site Assessment WDL 10/8/2019  2:52 PM   Line Status Infusing 10/8/2019  2:52 PM   Extravasation? No 10/8/2019  2:52 PM   Dressing Intervention Transparent 10/8/2019  2:52 PM   Dressing change due 10/13/19 10/8/2019  7:00 AM   Needle Change Due 10/13/19 10/8/2019  7:00 AM   Line Necessity Yes, meets criteria 10/8/2019  7:00 AM   Number of days: 1        Assessment:   ASA SCORE: 2    H&P: History and physical reviewed and following examination; no interval change.   Smoking Status:  " Non-Smoker/Unknown   NPO Status: NPO Appropriate     Plan:   Anes. Type:  General   Pre-Medication: Midazolam   Induction:  IV (Standard)   Airway: ETT; Oral   Access/Monitoring: Central Access/Port present   Maintenance: Balanced     Postop Plan:   Postop Pain: Opioids  Postop Sedation/Airway: Not planned  Disposition: Inpatient/Admit     PONV Management:   Adult Risk Factors: Female, Non-Smoker, Postop Opioids   Prevention: Ondansetron, Dexamethasone     CONSENT: Direct conversation   Plan and risks discussed with: Patient   Blood Products: Consent Deferred (Minimal Blood Loss)       Comments for Plan/Consent:  Agree with the assessment and plan above.    DO Wale Early MD  Staff Anesthesiologist  [phone]

## 2019-10-08 NOTE — OR NURSING
Sitter at bedside with patient. Pt calm and cooperative. No distress noted. Will continue to monitor.

## 2019-10-08 NOTE — PROGRESS NOTES
THORACIC & FOREGUT SURGERY    S:  No acute events overnight. She reports back and flank discomfort. Remains NPO with plans for repeat EGD w/ suturing of stent in place by GI today.         O:  Vitals:    10/08/19 0427 10/08/19 0736 10/08/19 0750 10/08/19 1000   BP: (!) 146/44 130/77     BP Location: Right arm Right arm     Pulse:       Resp: 18 18     Temp: 96.9  F (36.1  C) 97.5  F (36.4  C)     TempSrc: Oral Oral     SpO2: 97% 97% 96% 97%   Weight:       Height:           A&O, NAD  Breathing non-labored  Soft, NDNT  Distal extremities warm    Labs:  WBC 11.3 from 21.2  BMP wnl    10/8/2019 CXR shows improvement of small right pleural effusion    A/P: Nevin Alvarado is a 28 year old female POD# 1 s/p EGD with foreign body removal.  Clinically stable.     -GI consult for anti-migration stent suture placement   -Esophogram once suture in place  -Daily CXR  -OK for diet if esophogram negative   -Lovenox     D/w fellow, Dr. Razo, who discussed with staff    Silvia Sanches MD  PGY-3, General Surgery  [phone]

## 2019-10-08 NOTE — PLAN OF CARE
Neuro: A&Ox4, pt reports no suicidal ideation, 1:1 attendant at bedside for safety. Pt pleasant.   Cardiac: WDL.  Resp: Lung sounds clear/diminished, shallow breaths d/t pain, no SOB reported.   GI: + sounds. Last bm 10/6. Zofran given x 1 for nausea.   : Voiding spontaneously. Not saving.  Skin: Midline incision from previous surgery intact with steri-strips. Abdominal binder in place.  Pain/Nausea: c/o back and chest pain. Oxycodone, toradol, and prn IV morphine with some relief.  LDA: R chest port infusing LR at 100 ml/hr/. IV abx given. C/o itchiness under port site. Hypoallergenic dressing in place.  Diet: NPO.  Mobility: SBA, pt ambulated to bathroom and up in guerra with attendant.  New changes:  Denies any respiratory symptoms. One time dose of Benadryl given.  Labs: reviewed  Plan: EGD to have sutures placed around stent today. Report given to pacu. Bedside attendant for safety.  Esophagram scheduled tomorrow, 10/9 at 1030.

## 2019-10-08 NOTE — PROGRESS NOTES
STAFF ADDENDUM:  I saw and evaluated Ms. Alvarado and agree with the resident s findings and plan of care as documented in the resident s note and edited by me, as applicable.      In summary, Ms. Alvarado is complaining of pain, but is otherwise without clinical evidence of sepsis. She needs a re-endoscopy to stitch the stent in place, still NPO.   I spent a total of 15 minutes with Ms. Alvarado, 10 of which were spent in counseling and coordination of care. The patient had all questions answered and was in agreement with the plan.  Adrian Parks MD

## 2019-10-08 NOTE — OR NURSING
Case cancelled.  Dr Ramirez talked with pt at bedside.  7B notified and pt transferred back to floor.  Nursing time with pt was 2 hours.

## 2019-10-09 LAB
ANION GAP SERPL CALCULATED.3IONS-SCNC: 7 MMOL/L (ref 3–14)
BUN SERPL-MCNC: 14 MG/DL (ref 7–30)
CALCIUM SERPL-MCNC: 8.4 MG/DL (ref 8.5–10.1)
CHLORIDE SERPL-SCNC: 108 MMOL/L (ref 94–109)
CO2 SERPL-SCNC: 23 MMOL/L (ref 20–32)
CREAT SERPL-MCNC: 0.6 MG/DL (ref 0.52–1.04)
ERYTHROCYTE [DISTWIDTH] IN BLOOD BY AUTOMATED COUNT: 15.1 % (ref 10–15)
GFR SERPL CREATININE-BSD FRML MDRD: >90 ML/MIN/{1.73_M2}
GLUCOSE BLDC GLUCOMTR-MCNC: 76 MG/DL (ref 70–99)
GLUCOSE SERPL-MCNC: 85 MG/DL (ref 70–99)
HCG UR QL: NEGATIVE
HCT VFR BLD AUTO: 33.3 % (ref 35–47)
HGB BLD-MCNC: 10.6 G/DL (ref 11.7–15.7)
MCH RBC QN AUTO: 28.6 PG (ref 26.5–33)
MCHC RBC AUTO-ENTMCNC: 31.8 G/DL (ref 31.5–36.5)
MCV RBC AUTO: 90 FL (ref 78–100)
PLATELET # BLD AUTO: 194 10E9/L (ref 150–450)
POTASSIUM SERPL-SCNC: 3.5 MMOL/L (ref 3.4–5.3)
RBC # BLD AUTO: 3.71 10E12/L (ref 3.8–5.2)
SODIUM SERPL-SCNC: 139 MMOL/L (ref 133–144)
UPPER GI ENDOSCOPY: NORMAL
WBC # BLD AUTO: 7.8 10E9/L (ref 4–11)

## 2019-10-09 PROCEDURE — 25000565 ZZH ISOFLURANE, EA 15 MIN: Performed by: INTERNAL MEDICINE

## 2019-10-09 PROCEDURE — 27210794 ZZH OR GENERAL SUPPLY STERILE: Performed by: INTERNAL MEDICINE

## 2019-10-09 PROCEDURE — 25000128 H RX IP 250 OP 636: Performed by: ANESTHESIOLOGY

## 2019-10-09 PROCEDURE — 25000132 ZZH RX MED GY IP 250 OP 250 PS 637: Performed by: SURGERY

## 2019-10-09 PROCEDURE — 36000053 ZZH SURGERY LEVEL 2 EA 15 ADDTL MIN - UMMC: Performed by: INTERNAL MEDICINE

## 2019-10-09 PROCEDURE — C9113 INJ PANTOPRAZOLE SODIUM, VIA: HCPCS | Performed by: SURGERY

## 2019-10-09 PROCEDURE — 00000146 ZZHCL STATISTIC GLUCOSE BY METER IP

## 2019-10-09 PROCEDURE — 37000009 ZZH ANESTHESIA TECHNICAL FEE, EACH ADDTL 15 MIN: Performed by: INTERNAL MEDICINE

## 2019-10-09 PROCEDURE — 25800030 ZZH RX IP 258 OP 636: Performed by: STUDENT IN AN ORGANIZED HEALTH CARE EDUCATION/TRAINING PROGRAM

## 2019-10-09 PROCEDURE — 25000128 H RX IP 250 OP 636: Performed by: STUDENT IN AN ORGANIZED HEALTH CARE EDUCATION/TRAINING PROGRAM

## 2019-10-09 PROCEDURE — 71000015 ZZH RECOVERY PHASE 1 LEVEL 2 EA ADDTL HR: Performed by: INTERNAL MEDICINE

## 2019-10-09 PROCEDURE — 0DQ48ZZ REPAIR ESOPHAGOGASTRIC JUNCTION, VIA NATURAL OR ARTIFICIAL OPENING ENDOSCOPIC: ICD-10-PCS | Performed by: INTERNAL MEDICINE

## 2019-10-09 PROCEDURE — 71000014 ZZH RECOVERY PHASE 1 LEVEL 2 FIRST HR: Performed by: INTERNAL MEDICINE

## 2019-10-09 PROCEDURE — 40000170 ZZH STATISTIC PRE-PROCEDURE ASSESSMENT II: Performed by: INTERNAL MEDICINE

## 2019-10-09 PROCEDURE — 25000125 ZZHC RX 250: Performed by: STUDENT IN AN ORGANIZED HEALTH CARE EDUCATION/TRAINING PROGRAM

## 2019-10-09 PROCEDURE — 40000883 ZZH CANCELLED SURGERY UP TO 61-90 MINS: Performed by: INTERNAL MEDICINE

## 2019-10-09 PROCEDURE — 25000125 ZZHC RX 250: Performed by: NURSE ANESTHETIST, CERTIFIED REGISTERED

## 2019-10-09 PROCEDURE — 36415 COLL VENOUS BLD VENIPUNCTURE: CPT | Performed by: STUDENT IN AN ORGANIZED HEALTH CARE EDUCATION/TRAINING PROGRAM

## 2019-10-09 PROCEDURE — 37000008 ZZH ANESTHESIA TECHNICAL FEE, 1ST 30 MIN: Performed by: INTERNAL MEDICINE

## 2019-10-09 PROCEDURE — 25000128 H RX IP 250 OP 636: Performed by: SURGERY

## 2019-10-09 PROCEDURE — 25000128 H RX IP 250 OP 636: Performed by: NURSE ANESTHETIST, CERTIFIED REGISTERED

## 2019-10-09 PROCEDURE — 25000132 ZZH RX MED GY IP 250 OP 250 PS 637: Performed by: STUDENT IN AN ORGANIZED HEALTH CARE EDUCATION/TRAINING PROGRAM

## 2019-10-09 PROCEDURE — 85027 COMPLETE CBC AUTOMATED: CPT | Performed by: STUDENT IN AN ORGANIZED HEALTH CARE EDUCATION/TRAINING PROGRAM

## 2019-10-09 PROCEDURE — 81025 URINE PREGNANCY TEST: CPT | Performed by: ANESTHESIOLOGY

## 2019-10-09 PROCEDURE — 36000055 ZZH SURGERY LEVEL 2 W FLUORO 1ST 30 MIN - UMMC: Performed by: INTERNAL MEDICINE

## 2019-10-09 PROCEDURE — 12000001 ZZH R&B MED SURG/OB UMMC

## 2019-10-09 PROCEDURE — 80048 BASIC METABOLIC PNL TOTAL CA: CPT | Performed by: STUDENT IN AN ORGANIZED HEALTH CARE EDUCATION/TRAINING PROGRAM

## 2019-10-09 PROCEDURE — 25800030 ZZH RX IP 258 OP 636: Performed by: NURSE ANESTHETIST, CERTIFIED REGISTERED

## 2019-10-09 RX ORDER — GABAPENTIN 600 MG/1
600 TABLET ORAL 3 TIMES DAILY
Status: DISCONTINUED | OUTPATIENT
Start: 2019-10-09 | End: 2019-10-11 | Stop reason: HOSPADM

## 2019-10-09 RX ORDER — ONDANSETRON 2 MG/ML
INJECTION INTRAMUSCULAR; INTRAVENOUS PRN
Status: DISCONTINUED | OUTPATIENT
Start: 2019-10-09 | End: 2019-10-09

## 2019-10-09 RX ORDER — ONDANSETRON 2 MG/ML
8 INJECTION INTRAMUSCULAR; INTRAVENOUS EVERY 6 HOURS PRN
Status: DISCONTINUED | OUTPATIENT
Start: 2019-10-09 | End: 2019-10-11 | Stop reason: HOSPADM

## 2019-10-09 RX ORDER — HYDROXYZINE HYDROCHLORIDE 50 MG/ML
50 INJECTION, SOLUTION INTRAMUSCULAR EVERY 6 HOURS PRN
Status: COMPLETED | OUTPATIENT
Start: 2019-10-09 | End: 2019-10-09

## 2019-10-09 RX ORDER — ONDANSETRON 2 MG/ML
4 INJECTION INTRAMUSCULAR; INTRAVENOUS EVERY 30 MIN PRN
Status: DISCONTINUED | OUTPATIENT
Start: 2019-10-09 | End: 2019-10-09 | Stop reason: HOSPADM

## 2019-10-09 RX ORDER — FLUMAZENIL 0.1 MG/ML
0.2 INJECTION, SOLUTION INTRAVENOUS
Status: ACTIVE | OUTPATIENT
Start: 2019-10-09 | End: 2019-10-10

## 2019-10-09 RX ORDER — FENTANYL CITRATE 50 UG/ML
25-50 INJECTION, SOLUTION INTRAMUSCULAR; INTRAVENOUS
Status: DISCONTINUED | OUTPATIENT
Start: 2019-10-09 | End: 2019-10-09 | Stop reason: HOSPADM

## 2019-10-09 RX ORDER — LABETALOL HYDROCHLORIDE 5 MG/ML
INJECTION, SOLUTION INTRAVENOUS PRN
Status: DISCONTINUED | OUTPATIENT
Start: 2019-10-09 | End: 2019-10-09

## 2019-10-09 RX ORDER — BUSPIRONE HYDROCHLORIDE 5 MG/1
10 TABLET ORAL DAILY
Status: DISCONTINUED | OUTPATIENT
Start: 2019-10-09 | End: 2019-10-09

## 2019-10-09 RX ORDER — SODIUM CHLORIDE, SODIUM LACTATE, POTASSIUM CHLORIDE, CALCIUM CHLORIDE 600; 310; 30; 20 MG/100ML; MG/100ML; MG/100ML; MG/100ML
INJECTION, SOLUTION INTRAVENOUS CONTINUOUS
Status: DISCONTINUED | OUTPATIENT
Start: 2019-10-09 | End: 2019-10-09 | Stop reason: HOSPADM

## 2019-10-09 RX ORDER — DEXAMETHASONE SODIUM PHOSPHATE 4 MG/ML
INJECTION, SOLUTION INTRA-ARTICULAR; INTRALESIONAL; INTRAMUSCULAR; INTRAVENOUS; SOFT TISSUE PRN
Status: DISCONTINUED | OUTPATIENT
Start: 2019-10-09 | End: 2019-10-09

## 2019-10-09 RX ORDER — DIPHENHYDRAMINE HYDROCHLORIDE 50 MG/ML
INJECTION INTRAMUSCULAR; INTRAVENOUS PRN
Status: DISCONTINUED | OUTPATIENT
Start: 2019-10-09 | End: 2019-10-09

## 2019-10-09 RX ORDER — SODIUM CHLORIDE, SODIUM LACTATE, POTASSIUM CHLORIDE, CALCIUM CHLORIDE 600; 310; 30; 20 MG/100ML; MG/100ML; MG/100ML; MG/100ML
INJECTION, SOLUTION INTRAVENOUS CONTINUOUS PRN
Status: DISCONTINUED | OUTPATIENT
Start: 2019-10-09 | End: 2019-10-09

## 2019-10-09 RX ORDER — AMOXICILLIN 250 MG
2 CAPSULE ORAL 2 TIMES DAILY
Status: DISCONTINUED | OUTPATIENT
Start: 2019-10-09 | End: 2019-10-11

## 2019-10-09 RX ORDER — BUSPIRONE HYDROCHLORIDE 5 MG/1
10 TABLET ORAL 2 TIMES DAILY
Status: DISCONTINUED | OUTPATIENT
Start: 2019-10-09 | End: 2019-10-11 | Stop reason: HOSPADM

## 2019-10-09 RX ORDER — PROPOFOL 10 MG/ML
INJECTION, EMULSION INTRAVENOUS PRN
Status: DISCONTINUED | OUTPATIENT
Start: 2019-10-09 | End: 2019-10-09

## 2019-10-09 RX ORDER — ONDANSETRON 4 MG/1
8 TABLET, ORALLY DISINTEGRATING ORAL EVERY 6 HOURS PRN
Status: DISCONTINUED | OUTPATIENT
Start: 2019-10-09 | End: 2019-10-11 | Stop reason: HOSPADM

## 2019-10-09 RX ORDER — NALOXONE HYDROCHLORIDE 0.4 MG/ML
.1-.4 INJECTION, SOLUTION INTRAMUSCULAR; INTRAVENOUS; SUBCUTANEOUS
Status: DISCONTINUED | OUTPATIENT
Start: 2019-10-09 | End: 2019-10-09

## 2019-10-09 RX ORDER — CLONIDINE HYDROCHLORIDE 0.1 MG/1
0.1 TABLET ORAL 2 TIMES DAILY
Status: DISCONTINUED | OUTPATIENT
Start: 2019-10-09 | End: 2019-10-11 | Stop reason: HOSPADM

## 2019-10-09 RX ORDER — TOPIRAMATE 50 MG/1
100 TABLET, FILM COATED ORAL AT BEDTIME
Status: DISCONTINUED | OUTPATIENT
Start: 2019-10-09 | End: 2019-10-11 | Stop reason: HOSPADM

## 2019-10-09 RX ORDER — HYDROMORPHONE HYDROCHLORIDE 1 MG/ML
.3-.5 INJECTION, SOLUTION INTRAMUSCULAR; INTRAVENOUS; SUBCUTANEOUS EVERY 5 MIN PRN
Status: DISCONTINUED | OUTPATIENT
Start: 2019-10-09 | End: 2019-10-09 | Stop reason: HOSPADM

## 2019-10-09 RX ORDER — ONDANSETRON 4 MG/1
4 TABLET, ORALLY DISINTEGRATING ORAL EVERY 30 MIN PRN
Status: DISCONTINUED | OUTPATIENT
Start: 2019-10-09 | End: 2019-10-09 | Stop reason: HOSPADM

## 2019-10-09 RX ORDER — POLYETHYLENE GLYCOL 3350 17 G/17G
17 POWDER, FOR SOLUTION ORAL DAILY
Status: DISCONTINUED | OUTPATIENT
Start: 2019-10-09 | End: 2019-10-11

## 2019-10-09 RX ORDER — GABAPENTIN 250 MG/5ML
600 SOLUTION ORAL 3 TIMES DAILY
Status: DISCONTINUED | OUTPATIENT
Start: 2019-10-09 | End: 2019-10-09

## 2019-10-09 RX ORDER — LIDOCAINE 40 MG/G
CREAM TOPICAL
Status: DISCONTINUED | OUTPATIENT
Start: 2019-10-09 | End: 2019-10-09 | Stop reason: HOSPADM

## 2019-10-09 RX ORDER — FENTANYL CITRATE 50 UG/ML
INJECTION, SOLUTION INTRAMUSCULAR; INTRAVENOUS PRN
Status: DISCONTINUED | OUTPATIENT
Start: 2019-10-09 | End: 2019-10-09

## 2019-10-09 RX ORDER — GABAPENTIN 300 MG/1
600 CAPSULE ORAL 2 TIMES DAILY
Status: DISCONTINUED | OUTPATIENT
Start: 2019-10-09 | End: 2019-10-09

## 2019-10-09 RX ADMIN — KETOROLAC TROMETHAMINE 15 MG: 15 INJECTION, SOLUTION INTRAMUSCULAR; INTRAVENOUS at 15:11

## 2019-10-09 RX ADMIN — GABAPENTIN 600 MG: 250 SUSPENSION ORAL at 13:46

## 2019-10-09 RX ADMIN — LABETALOL HYDROCHLORIDE 10 MG: 5 INJECTION INTRAVENOUS at 20:18

## 2019-10-09 RX ADMIN — LEVOFLOXACIN 500 MG: 5 INJECTION, SOLUTION INTRAVENOUS at 11:23

## 2019-10-09 RX ADMIN — MORPHINE SULFATE 2 MG: 2 INJECTION, SOLUTION INTRAMUSCULAR; INTRAVENOUS at 11:33

## 2019-10-09 RX ADMIN — POLYETHYLENE GLYCOL 3350 17 G: 17 POWDER, FOR SOLUTION ORAL at 13:46

## 2019-10-09 RX ADMIN — OXYCODONE HYDROCHLORIDE 5 MG: 5 SOLUTION ORAL at 12:32

## 2019-10-09 RX ADMIN — MORPHINE SULFATE 2 MG: 2 INJECTION, SOLUTION INTRAMUSCULAR; INTRAVENOUS at 03:27

## 2019-10-09 RX ADMIN — SENNOSIDES AND DOCUSATE SODIUM 2 TABLET: 8.6; 5 TABLET ORAL at 13:46

## 2019-10-09 RX ADMIN — HYDROMORPHONE HYDROCHLORIDE 0.3 MG: 1 INJECTION, SOLUTION INTRAMUSCULAR; INTRAVENOUS; SUBCUTANEOUS at 21:15

## 2019-10-09 RX ADMIN — FLUCONAZOLE IN SODIUM CHLORIDE 400 MG: 2 INJECTION, SOLUTION INTRAVENOUS at 12:33

## 2019-10-09 RX ADMIN — KETOROLAC TROMETHAMINE 15 MG: 15 INJECTION, SOLUTION INTRAMUSCULAR; INTRAVENOUS at 03:11

## 2019-10-09 RX ADMIN — ONDANSETRON 4 MG: 2 INJECTION INTRAMUSCULAR; INTRAVENOUS at 20:42

## 2019-10-09 RX ADMIN — ONDANSETRON 4 MG: 2 INJECTION INTRAMUSCULAR; INTRAVENOUS at 19:29

## 2019-10-09 RX ADMIN — SODIUM CHLORIDE, POTASSIUM CHLORIDE, SODIUM LACTATE AND CALCIUM CHLORIDE: 600; 310; 30; 20 INJECTION, SOLUTION INTRAVENOUS at 21:05

## 2019-10-09 RX ADMIN — FENTANYL CITRATE 50 MCG: 50 INJECTION, SOLUTION INTRAMUSCULAR; INTRAVENOUS at 20:39

## 2019-10-09 RX ADMIN — FENTANYL CITRATE 50 MCG: 50 INJECTION, SOLUTION INTRAMUSCULAR; INTRAVENOUS at 21:11

## 2019-10-09 RX ADMIN — SUGAMMADEX 250 MG: 100 INJECTION, SOLUTION INTRAVENOUS at 20:10

## 2019-10-09 RX ADMIN — DEXAMETHASONE SODIUM PHOSPHATE 8 MG: 4 INJECTION, SOLUTION INTRA-ARTICULAR; INTRALESIONAL; INTRAMUSCULAR; INTRAVENOUS; SOFT TISSUE at 19:29

## 2019-10-09 RX ADMIN — SODIUM CHLORIDE, POTASSIUM CHLORIDE, SODIUM LACTATE AND CALCIUM CHLORIDE: 600; 310; 30; 20 INJECTION, SOLUTION INTRAVENOUS at 19:06

## 2019-10-09 RX ADMIN — LABETALOL HYDROCHLORIDE 5 MG: 5 INJECTION INTRAVENOUS at 20:04

## 2019-10-09 RX ADMIN — ONDANSETRON HYDROCHLORIDE 8 MG: 2 INJECTION, SOLUTION INTRAMUSCULAR; INTRAVENOUS at 09:59

## 2019-10-09 RX ADMIN — CLINDAMYCIN IN 5 PERCENT DEXTROSE 600 MG: 12 INJECTION, SOLUTION INTRAVENOUS at 14:27

## 2019-10-09 RX ADMIN — HYDROXYZINE HYDROCHLORIDE 50 MG: 50 INJECTION, SOLUTION INTRAMUSCULAR at 05:47

## 2019-10-09 RX ADMIN — ONDANSETRON HYDROCHLORIDE 4 MG: 2 INJECTION, SOLUTION INTRAMUSCULAR; INTRAVENOUS at 03:11

## 2019-10-09 RX ADMIN — HYDROMORPHONE HYDROCHLORIDE 0.2 MG: 1 INJECTION, SOLUTION INTRAMUSCULAR; INTRAVENOUS; SUBCUTANEOUS at 21:21

## 2019-10-09 RX ADMIN — FENTANYL CITRATE 100 MCG: 50 INJECTION, SOLUTION INTRAMUSCULAR; INTRAVENOUS at 19:14

## 2019-10-09 RX ADMIN — LABETALOL HYDROCHLORIDE 5 MG: 5 INJECTION INTRAVENOUS at 20:22

## 2019-10-09 RX ADMIN — MIDAZOLAM 2 MG: 1 INJECTION INTRAMUSCULAR; INTRAVENOUS at 19:06

## 2019-10-09 RX ADMIN — MORPHINE SULFATE 2 MG: 2 INJECTION, SOLUTION INTRAMUSCULAR; INTRAVENOUS at 22:47

## 2019-10-09 RX ADMIN — FENTANYL CITRATE 50 MCG: 50 INJECTION, SOLUTION INTRAMUSCULAR; INTRAVENOUS at 20:55

## 2019-10-09 RX ADMIN — OXYCODONE HYDROCHLORIDE 5 MG: 5 SOLUTION ORAL at 08:07

## 2019-10-09 RX ADMIN — LABETALOL HYDROCHLORIDE 10 MG: 5 INJECTION INTRAVENOUS at 20:14

## 2019-10-09 RX ADMIN — PANTOPRAZOLE SODIUM 40 MG: 40 INJECTION, POWDER, FOR SOLUTION INTRAVENOUS at 08:45

## 2019-10-09 RX ADMIN — MORPHINE SULFATE 2 MG: 2 INJECTION, SOLUTION INTRAMUSCULAR; INTRAVENOUS at 13:52

## 2019-10-09 RX ADMIN — ROCURONIUM BROMIDE 50 MG: 10 INJECTION INTRAVENOUS at 19:14

## 2019-10-09 RX ADMIN — OXYCODONE HYDROCHLORIDE 5 MG: 5 SOLUTION ORAL at 04:31

## 2019-10-09 RX ADMIN — FENTANYL CITRATE 50 MCG: 50 INJECTION, SOLUTION INTRAMUSCULAR; INTRAVENOUS at 21:05

## 2019-10-09 RX ADMIN — OXYCODONE HYDROCHLORIDE 5 MG: 5 SOLUTION ORAL at 16:46

## 2019-10-09 RX ADMIN — MORPHINE SULFATE 2 MG: 2 INJECTION, SOLUTION INTRAMUSCULAR; INTRAVENOUS at 05:47

## 2019-10-09 RX ADMIN — SODIUM CHLORIDE, POTASSIUM CHLORIDE, SODIUM LACTATE AND CALCIUM CHLORIDE: 600; 310; 30; 20 INJECTION, SOLUTION INTRAVENOUS at 06:00

## 2019-10-09 RX ADMIN — MORPHINE SULFATE 2 MG: 2 INJECTION, SOLUTION INTRAMUSCULAR; INTRAVENOUS at 09:42

## 2019-10-09 RX ADMIN — CLINDAMYCIN IN 5 PERCENT DEXTROSE 600 MG: 12 INJECTION, SOLUTION INTRAVENOUS at 04:43

## 2019-10-09 RX ADMIN — PROPOFOL 200 MG: 10 INJECTION, EMULSION INTRAVENOUS at 19:14

## 2019-10-09 RX ADMIN — DIPHENHYDRAMINE HYDROCHLORIDE 25 MG: 50 INJECTION, SOLUTION INTRAMUSCULAR; INTRAVENOUS at 19:39

## 2019-10-09 RX ADMIN — OXYCODONE HYDROCHLORIDE 5 MG: 5 SOLUTION ORAL at 00:03

## 2019-10-09 RX ADMIN — MORPHINE SULFATE 2 MG: 2 INJECTION, SOLUTION INTRAMUSCULAR; INTRAVENOUS at 01:17

## 2019-10-09 RX ADMIN — KETOROLAC TROMETHAMINE 15 MG: 15 INJECTION, SOLUTION INTRAMUSCULAR; INTRAVENOUS at 08:45

## 2019-10-09 ASSESSMENT — ACTIVITIES OF DAILY LIVING (ADL)
ADLS_ACUITY_SCORE: 11
ADLS_ACUITY_SCORE: 12
ADLS_ACUITY_SCORE: 11

## 2019-10-09 NOTE — PLAN OF CARE
"Reason for Admission: Foreign body ingestion     Vs: /86 (BP Location: Right arm)   Pulse 82   Temp 98.6  F (37  C) (Oral)   Resp 18   Ht 1.575 m (5' 2\")   Wt 121.6 kg (268 lb)   LMP  (Within Years)   SpO2 94%   Breastfeeding? No   BMI 49.02 kg/m       Activity: Standby assist - sitter at bedside.     Neuro: Alert & oriented x4. Anxious. One time dose of hydroxyzine given.      Cardiac: S1, S2. Regular rate and rhythm. Denies chest pain.     Respiratory: Lung sounds clear and equal bilaterally. Sating 90's on room air. Infrequent cough.     GI/: Voiding spontaneously without difficulty. BS hypoactive. Last BM before admission. Intermittent nausea, PRN zofran given with relief.     Skin: Warm, dry.      Diet: NPO except for meds.     Lines/Drains/Wounds: Midline incision from previous hospitalization closed with steri strips, no drainage present. R Larissa cath infusing LR @ 100 mL/hr in between antibiotics.      Labs: Reviewed.     Pain: Chest, back, and abdominal pain. PRN morphine given ~ Q2H.      Plan: GI to place stiches around stent today at 1030.     "

## 2019-10-09 NOTE — PROVIDER NOTIFICATION
Patient reporting increasing amounts of pain and anxiety throughout shift. MD paged, one time dose of hydroxyzine ordered.

## 2019-10-09 NOTE — PROGRESS NOTES
[id number]  VSS on RA. Pt was scheduled for EDG today which was cancleed. Pt complaining of pain, PRN Morphine, scheduled Tordol, and scheduled Oxy given. Icy Hot patch in place. LR 100ml running through port with antibiotics as scheduled. Midline closed with steri strips, abd binder in place. 1:1 sitter in place due to safety concerns. Pt NPO except for liquid meds. Will continue to monitor and follow POC.

## 2019-10-09 NOTE — PLAN OF CARE
"BP (!) 146/91 (BP Location: Right arm)   Pulse 82   Temp 97.3  F (36.3  C) (Oral)   Resp 16   Ht 1.575 m (5' 2\")   Wt 121.6 kg (268 lb)   LMP  (Within Years)   SpO2 97%   Breastfeeding? No   BMI 49.02 kg/m       Neuro: Anxious and flat affect.   Cardiac: WDL  Respiratory: WDL  GI/: No change  Diet/Appetite: NPO except for meds  Skin: Intact  except for surgical sites.   LDA: Rt port chest infusing meds and MIV  Activity  per tolerance  Pain: Receives pain meds frequently   Plan: Continue to follow up per plan of care.    "

## 2019-10-10 ENCOUNTER — APPOINTMENT (OUTPATIENT)
Dept: GENERAL RADIOLOGY | Facility: CLINIC | Age: 29
DRG: 909 | End: 2019-10-10
Payer: COMMERCIAL

## 2019-10-10 ENCOUNTER — PREP FOR PROCEDURE (OUTPATIENT)
Dept: SURGERY | Facility: CLINIC | Age: 29
End: 2019-10-10

## 2019-10-10 DIAGNOSIS — K22.2 ESOPHAGEAL STRICTURE: Primary | ICD-10-CM

## 2019-10-10 LAB
ANION GAP SERPL CALCULATED.3IONS-SCNC: 6 MMOL/L (ref 3–14)
BLD PROD TYP BPU: NORMAL
BLD PROD TYP BPU: NORMAL
BLD UNIT ID BPU: 0
BLD UNIT ID BPU: 0
BLOOD PRODUCT CODE: NORMAL
BLOOD PRODUCT CODE: NORMAL
BPU ID: NORMAL
BPU ID: NORMAL
BUN SERPL-MCNC: 8 MG/DL (ref 7–30)
CALCIUM SERPL-MCNC: 8.8 MG/DL (ref 8.5–10.1)
CHLORIDE SERPL-SCNC: 107 MMOL/L (ref 94–109)
CO2 SERPL-SCNC: 24 MMOL/L (ref 20–32)
CREAT SERPL-MCNC: 0.5 MG/DL (ref 0.52–1.04)
ERYTHROCYTE [DISTWIDTH] IN BLOOD BY AUTOMATED COUNT: 14.6 % (ref 10–15)
GFR SERPL CREATININE-BSD FRML MDRD: >90 ML/MIN/{1.73_M2}
GLUCOSE SERPL-MCNC: 162 MG/DL (ref 70–99)
HCT VFR BLD AUTO: 34.7 % (ref 35–47)
HGB BLD-MCNC: 10.7 G/DL (ref 11.7–15.7)
MCH RBC QN AUTO: 28.2 PG (ref 26.5–33)
MCHC RBC AUTO-ENTMCNC: 30.8 G/DL (ref 31.5–36.5)
MCV RBC AUTO: 91 FL (ref 78–100)
PLATELET # BLD AUTO: 211 10E9/L (ref 150–450)
POTASSIUM SERPL-SCNC: 3.6 MMOL/L (ref 3.4–5.3)
RBC # BLD AUTO: 3.8 10E12/L (ref 3.8–5.2)
SODIUM SERPL-SCNC: 138 MMOL/L (ref 133–144)
TRANSFUSION STATUS PATIENT QL: NORMAL
WBC # BLD AUTO: 7.1 10E9/L (ref 4–11)

## 2019-10-10 PROCEDURE — 25000132 ZZH RX MED GY IP 250 OP 250 PS 637: Performed by: STUDENT IN AN ORGANIZED HEALTH CARE EDUCATION/TRAINING PROGRAM

## 2019-10-10 PROCEDURE — 25000125 ZZHC RX 250: Performed by: STUDENT IN AN ORGANIZED HEALTH CARE EDUCATION/TRAINING PROGRAM

## 2019-10-10 PROCEDURE — 36415 COLL VENOUS BLD VENIPUNCTURE: CPT | Performed by: STUDENT IN AN ORGANIZED HEALTH CARE EDUCATION/TRAINING PROGRAM

## 2019-10-10 PROCEDURE — 25000132 ZZH RX MED GY IP 250 OP 250 PS 637: Performed by: SURGERY

## 2019-10-10 PROCEDURE — 25000128 H RX IP 250 OP 636: Performed by: STUDENT IN AN ORGANIZED HEALTH CARE EDUCATION/TRAINING PROGRAM

## 2019-10-10 PROCEDURE — 71046 X-RAY EXAM CHEST 2 VIEWS: CPT

## 2019-10-10 PROCEDURE — 25000131 ZZH RX MED GY IP 250 OP 636 PS 637: Performed by: STUDENT IN AN ORGANIZED HEALTH CARE EDUCATION/TRAINING PROGRAM

## 2019-10-10 PROCEDURE — 85027 COMPLETE CBC AUTOMATED: CPT | Performed by: STUDENT IN AN ORGANIZED HEALTH CARE EDUCATION/TRAINING PROGRAM

## 2019-10-10 PROCEDURE — 12000001 ZZH R&B MED SURG/OB UMMC

## 2019-10-10 PROCEDURE — 25000128 H RX IP 250 OP 636: Performed by: SURGERY

## 2019-10-10 PROCEDURE — 74220 X-RAY XM ESOPHAGUS 1CNTRST: CPT

## 2019-10-10 PROCEDURE — C9113 INJ PANTOPRAZOLE SODIUM, VIA: HCPCS | Performed by: SURGERY

## 2019-10-10 PROCEDURE — 80048 BASIC METABOLIC PNL TOTAL CA: CPT | Performed by: STUDENT IN AN ORGANIZED HEALTH CARE EDUCATION/TRAINING PROGRAM

## 2019-10-10 PROCEDURE — 25000132 ZZH RX MED GY IP 250 OP 250 PS 637: Performed by: THORACIC SURGERY (CARDIOTHORACIC VASCULAR SURGERY)

## 2019-10-10 RX ORDER — PANTOPRAZOLE SODIUM 40 MG/1
40 TABLET, DELAYED RELEASE ORAL
Status: DISCONTINUED | OUTPATIENT
Start: 2019-10-11 | End: 2019-10-11 | Stop reason: HOSPADM

## 2019-10-10 RX ORDER — CLINDAMYCIN PALMITATE HYDROCHLORIDE 75 MG/5ML
300 SOLUTION ORAL EVERY 6 HOURS
Status: DISCONTINUED | OUTPATIENT
Start: 2019-10-10 | End: 2019-10-11 | Stop reason: HOSPADM

## 2019-10-10 RX ORDER — DIPHENHYDRAMINE HCL 12.5MG/5ML
25 LIQUID (ML) ORAL
Status: DISCONTINUED | OUTPATIENT
Start: 2019-10-10 | End: 2019-10-11 | Stop reason: HOSPADM

## 2019-10-10 RX ORDER — FLUCONAZOLE 40 MG/ML
200 POWDER, FOR SUSPENSION ORAL DAILY
Status: DISCONTINUED | OUTPATIENT
Start: 2019-10-11 | End: 2019-10-11 | Stop reason: HOSPADM

## 2019-10-10 RX ORDER — MORPHINE SULFATE 2 MG/ML
2 INJECTION, SOLUTION INTRAMUSCULAR; INTRAVENOUS EVERY 4 HOURS PRN
Status: DISCONTINUED | OUTPATIENT
Start: 2019-10-10 | End: 2019-10-11 | Stop reason: HOSPADM

## 2019-10-10 RX ORDER — IBUPROFEN 100 MG/5ML
400 SUSPENSION, ORAL (FINAL DOSE FORM) ORAL EVERY 6 HOURS PRN
Status: DISCONTINUED | OUTPATIENT
Start: 2019-10-10 | End: 2019-10-11 | Stop reason: HOSPADM

## 2019-10-10 RX ORDER — LEVOFLOXACIN 25 MG/ML
500 SOLUTION ORAL DAILY
Status: DISCONTINUED | OUTPATIENT
Start: 2019-10-11 | End: 2019-10-11 | Stop reason: HOSPADM

## 2019-10-10 RX ADMIN — GABAPENTIN 600 MG: 600 TABLET, FILM COATED ORAL at 20:20

## 2019-10-10 RX ADMIN — LURASIDONE HYDROCHLORIDE 60 MG: 20 TABLET, FILM COATED ORAL at 20:20

## 2019-10-10 RX ADMIN — TOPIRAMATE 100 MG: 50 TABLET ORAL at 20:20

## 2019-10-10 RX ADMIN — OXYCODONE HYDROCHLORIDE 5 MG: 5 SOLUTION ORAL at 12:10

## 2019-10-10 RX ADMIN — FLUCONAZOLE IN SODIUM CHLORIDE 400 MG: 2 INJECTION, SOLUTION INTRAVENOUS at 10:02

## 2019-10-10 RX ADMIN — OXYCODONE HYDROCHLORIDE 5 MG: 5 SOLUTION ORAL at 20:19

## 2019-10-10 RX ADMIN — IBUPROFEN 400 MG: 200 SUSPENSION ORAL at 15:24

## 2019-10-10 RX ADMIN — GABAPENTIN 600 MG: 600 TABLET, FILM COATED ORAL at 09:48

## 2019-10-10 RX ADMIN — ENOXAPARIN SODIUM 40 MG: 40 INJECTION SUBCUTANEOUS at 12:10

## 2019-10-10 RX ADMIN — DIATRIZOATE MEGLUMINE AND DIATRIZOATE SODIUM 120 ML: 660; 100 SOLUTION ORAL; RECTAL at 08:55

## 2019-10-10 RX ADMIN — KETOROLAC TROMETHAMINE 15 MG: 15 INJECTION, SOLUTION INTRAMUSCULAR; INTRAVENOUS at 00:28

## 2019-10-10 RX ADMIN — DESVENLAFAXINE 100 MG: 50 TABLET, FILM COATED, EXTENDED RELEASE ORAL at 09:47

## 2019-10-10 RX ADMIN — OXYCODONE HYDROCHLORIDE 5 MG: 5 SOLUTION ORAL at 16:18

## 2019-10-10 RX ADMIN — CLINDAMYCIN PALMITATE HYDROCHLORIDE 300 MG: 75 SOLUTION ORAL at 20:21

## 2019-10-10 RX ADMIN — CLONIDINE HYDROCHLORIDE 0.1 MG: 0.1 TABLET ORAL at 09:48

## 2019-10-10 RX ADMIN — CLINDAMYCIN PALMITATE HYDROCHLORIDE 300 MG: 75 SOLUTION ORAL at 12:54

## 2019-10-10 RX ADMIN — KETOROLAC TROMETHAMINE 15 MG: 15 INJECTION, SOLUTION INTRAMUSCULAR; INTRAVENOUS at 06:03

## 2019-10-10 RX ADMIN — MORPHINE SULFATE 2 MG: 2 INJECTION, SOLUTION INTRAMUSCULAR; INTRAVENOUS at 14:18

## 2019-10-10 RX ADMIN — MORPHINE SULFATE 2 MG: 2 INJECTION, SOLUTION INTRAMUSCULAR; INTRAVENOUS at 18:26

## 2019-10-10 RX ADMIN — GABAPENTIN 600 MG: 600 TABLET, FILM COATED ORAL at 13:55

## 2019-10-10 RX ADMIN — LEVOFLOXACIN 500 MG: 5 INJECTION, SOLUTION INTRAVENOUS at 08:13

## 2019-10-10 RX ADMIN — MORPHINE SULFATE 2 MG: 2 INJECTION, SOLUTION INTRAMUSCULAR; INTRAVENOUS at 06:57

## 2019-10-10 RX ADMIN — MORPHINE SULFATE 2 MG: 2 INJECTION, SOLUTION INTRAMUSCULAR; INTRAVENOUS at 01:58

## 2019-10-10 RX ADMIN — IBUPROFEN 400 MG: 200 SUSPENSION ORAL at 21:38

## 2019-10-10 RX ADMIN — CLONIDINE HYDROCHLORIDE 0.1 MG: 0.1 TABLET ORAL at 20:19

## 2019-10-10 RX ADMIN — ONDANSETRON 8 MG: 4 TABLET, ORALLY DISINTEGRATING ORAL at 16:22

## 2019-10-10 RX ADMIN — PANTOPRAZOLE SODIUM 40 MG: 40 INJECTION, POWDER, FOR SOLUTION INTRAVENOUS at 10:02

## 2019-10-10 RX ADMIN — BUSPIRONE HYDROCHLORIDE 10 MG: 5 TABLET ORAL at 09:47

## 2019-10-10 RX ADMIN — CLINDAMYCIN IN 5 PERCENT DEXTROSE 600 MG: 12 INJECTION, SOLUTION INTRAVENOUS at 04:05

## 2019-10-10 RX ADMIN — DIPHENHYDRAMINE HYDROCHLORIDE 25 MG: 25 SOLUTION ORAL at 02:07

## 2019-10-10 RX ADMIN — OXYCODONE HYDROCHLORIDE 5 MG: 5 SOLUTION ORAL at 00:28

## 2019-10-10 RX ADMIN — OXYCODONE HYDROCHLORIDE 5 MG: 5 SOLUTION ORAL at 04:05

## 2019-10-10 RX ADMIN — MORPHINE SULFATE 2 MG: 2 INJECTION, SOLUTION INTRAMUSCULAR; INTRAVENOUS at 10:01

## 2019-10-10 RX ADMIN — BUSPIRONE HYDROCHLORIDE 10 MG: 5 TABLET ORAL at 20:20

## 2019-10-10 RX ADMIN — OXYCODONE HYDROCHLORIDE 5 MG: 5 SOLUTION ORAL at 08:24

## 2019-10-10 ASSESSMENT — MIFFLIN-ST. JEOR: SCORE: 1907.52

## 2019-10-10 ASSESSMENT — ACTIVITIES OF DAILY LIVING (ADL)
ADLS_ACUITY_SCORE: 10
ADLS_ACUITY_SCORE: 11
ADLS_ACUITY_SCORE: 10
ADLS_ACUITY_SCORE: 11

## 2019-10-10 NOTE — PROGRESS NOTES
THORACIC & FOREGUT SURGERY    S:  No acute events overnight. No changes in pain quality/location. Underwent suturing of stent by GI yesterday. Remains NPO until esophagram completed.    O:  Vitals:    10/10/19 0400 10/10/19 0500 10/10/19 0600 10/10/19 0700   BP: (!) 140/74 (!) 155/78 (!) 146/76 (!) 140/74   BP Location: Right arm Right arm Right arm Right arm   Pulse:       Resp: 11 12 15 8   Temp: 97.6  F (36.4  C)   97.3  F (36.3  C)   TempSrc: Oral   Oral   SpO2: 97% 94% 95% 91%   Weight:       Height:           A&O, NAD  Breathing non-labored  Soft, NDNT  Distal extremities warm      A/P: Nevinvidhya Alvarado is a 28 year old female s/p EGD on 10/7 with foreign body removal.  Clinically stable.     - Esophogram today  - Daily CXR  - OK for diet if esophogram negative   - Continue clindamycin, levofloxacin, fluconazole  - May be able to discharge today if esophagram is normal    D/w fellow and staff    Dick Greenberg MD, PhD, PGY-1  General Surgery

## 2019-10-10 NOTE — PROGRESS NOTES
Care Coordinator Progress Note    Admission Date/Time:  10/6/2019  Attending MD:  Timoteo Espana MD    Data  Chart reviewed, discussed with interdisciplinary team.   Patient was admitted for:    Esophageal perforation  Foreign body in esophagus, initial encounter  Foreign body, swallowed, initial encounter.    Concerns with insurance coverage for discharge needs: None.  Current Living Situation: Patient lives alone.  Support System: Supportive  Services Involved: Home Care  Transportation at Discharge: TBD  Barriers to Discharge: medical clearance    Coordination of Care and Referrals: Provided patient/family with options for Home Care.        Assessment  Patient asked to speak with this writer regarding home care.  Met with patient at bedside to introduce RNCC role and discuss discharge planning.  Patient reports that she gets weekly nursing visits to assist with her medications through [facility](P: [phone], F: [phone]).  Patient requested that this writer call [facility] and update them regarding her admission and provide them with resumption orders.  Called and updated staff at [facility] about patients admission, they asked that orders be faxed when patient is ready for discharge.  This was added to the AVS.  RNCC will continue to follow and assist with discharge planning as needed.       Plan  Anticipated Discharge Date:  1-2 days  Anticipated Discharge Plan:  Home with resumption of home services    TOM Irizarry  Phone: [phone]  Pager: [phone]  To contact weekend RNCC, dial [phone] and enter pager number [phone] at prompt. This pager can not be contacted by text page or outside line.

## 2019-10-10 NOTE — PLAN OF CARE
"BP (!) 144/102   Pulse 76   Temp 98.5  F (36.9  C) (Oral)   Resp 12   Ht 1.575 m (5' 2\")   Wt 121.6 kg (268 lb)   LMP  (Within Years)   SpO2 98%   Breastfeeding? No   BMI 49.02 kg/m      Status: POD#0 s/p EGD for suture placement to repair esophageal stent repair  Activity: SBA (bedside attendant)  Neuros: A&Ox4; flat affect and anxiety over cares but neuros otherwise intact.  Cardiac: WDL ex: hypertensive, at baseline; denies chest pain.  Respiratory: WDL on 1LPM NC, denies SOB.  GI/: +BS, voids spont, not saving. LBM 10/6.  Diet: NPO for procedure tomorrow.  Skin/Incisions: Midline lower abdominal incision approximated. Otherwise intact.  Lines/Drains: Right chest port running LR @100/hr.  Pain/Nausea: C/o moderate generalized pain, managed with sched oxycodone.  New Changes: Pt returned from PACU at 2200 this shift.  Plan: Continue to monitor and follow POC.    "

## 2019-10-10 NOTE — PLAN OF CARE
"Vital signs:  Temp: 97.6  F (36.4  C) Temp src: Oral BP: (!) 140/74 Pulse: 76 Heart Rate: 68 Resp: 11 SpO2: 97 % O2 Device: Nasal cannula Oxygen Delivery: 2 LPM Height: 157.5 cm (5' 2\") Weight: 121.6 kg (268 lb)    Activity: SBA  Neuros: ex anxious and repeated requests for medications. Cooperative and understands medication teaching.    Cardiac: WDL  Respiratory: WDL  GI/: C/o adb discomfort. 1 watery green stool. Voiding adequately.   Diet: NPO ex meds  Lines: PIV w/LR at 100mL/hr with int abx.   Pain/nausea: Schedule 5mg oral solution oxy q4, scheduled IV Toradol q6, PRN IV morphine x1. Oral solution benadrylgiven x1 for sleep aid.   Plan:   Esophogram at 0830 today.           "

## 2019-10-10 NOTE — DISCHARGE INSTRUCTIONS
__________________________________________________________  D/C'ing nurse: Please fax to following agencies at time of patient d/c.  ____________________________________________________________    [facility]  Fax: [phone]

## 2019-10-10 NOTE — ANESTHESIA CARE TRANSFER NOTE
Patient: Nevin Alvarado    Procedure(s):  Upper Endoscopy with Suture Placement    Diagnosis: Esophageal rupture [K22.3]  Diagnosis Additional Information: No value filed.    Anesthesia Type:   General     Note:  Airway :Nasal Cannula  Patient transferred to:PACU  Comments: Patient oxygenating and ventilating well on 2LNC.  Patient HALL, following commands, and denies pain.  Report given to RN and VSS.Handoff Report: Identifed the Patient, Identified the Reponsible Provider, Reviewed the pertinent medical history, Discussed the surgical course, Reviewed Intra-OP anesthesia mangement and issues during anesthesia, Set expectations for post-procedure period and Allowed opportunity for questions and acknowledgement of understanding      Vitals: (Last set prior to Anesthesia Care Transfer)    CRNA VITALS  10/9/2019 1948 - 10/9/2019 2027      10/9/2019             NIBP:  (!) 165/103    Pulse:  74    SpO2:  100 %    Resp Rate (observed):  10                Electronically Signed By: HU Whitaker CRNA  October 9, 2019  8:27 PM

## 2019-10-10 NOTE — DISCHARGE SUMMARY
NAME: Nevin Alvarado   MRN: [id number]   : 1990     DATE OF ADMISSION: 10/6/2019     PRE/POSTOPERATIVE DIAGNOSES: Esophageal foreign body, esophageal perforation    PROCEDURES PERFORMED: EGD with foreign body removal, esophageal stent placement with fluoroscopy    PATHOLOGY RESULTS: none     CULTURE RESULTS: None    INTRAOPERATIVE COMPLICATIONS: None    POSTOPERATIVE COMPLICATIONS: None     DRAINS/TUBES PRESENT AT DISCHARGE: Esophageal stent    DATE OF DISCHARGE:  2019     HOSPITAL COURSE: Nevin Alvarado is a 28 year old female who on 10/6/2019 underwent the above-named procedures. She was initially transferred from an outside hospital after attempted retrieval of the foreign bodies resulted in perforation. She tolerated the operation well and postoperatively was transferred to the general post-surgical unit.  The remainder of her course was essentially uncomplicated. Prior to discharge, her pain was controlled well, she was able to perform ADLs and ambulate independently without difficulty, and had full return of bowel and bladder function. On 2019, she was discharged to home in stable condition with esophageal stent in place.    DISCHARGE EXAM:   A&O, NAD  Resp non-labored  Distal extremities warm  Abdomen soft, nondistended    Incisions c/d/i     DISCHARGE INSTRUCTIONS:  Discharge Procedure Orders   Home care nursing referral   Referral Priority: Routine Referral Type: Home Health Therapies & Aides   Number of Visits Requested: 1     Reason for your hospital stay   Order Comments: Esophageal perforation, placement of esophageal stent     Follow Up and recommended labs and tests   Order Comments: 1.) Follow up with primary care physician, Latonya Knight, in 1-2 weeks.  2.) Follow up with Dr. Espana in 3 weeks for stent removal     Activity   Order Comments: Your activity upon discharge: activity as tolerated     Order Specific Question Answer Comments   Is  "discharge order? Yes      Discharge Instructions   Order Comments: THORACIC SURGERY DISCHARGE INSTRUCTIONS    DIET: Full liquid diet, may introduce soft foods slowly.  Continue on soft diet until follow up.    If your plans upon discharge include prolonged periods of sitting (i.e a lengthy car or plane ride), it is highly beneficial to get up and walk at least once per hour to help prevent swelling and blood clots.     Take incentive spirometer home for continued frequent use    Activity as tolerated, no strenous activity until seen in follow-up.    Stay hydrated. Take over the counter fiber (metamucil or benefiber) and stool softeners (Miralax, docusate or senna) if becoming constipated.     Call for fever greater than 101.5, chills, increased size of incision, red skin around incision, vision changes, muscle strength changes, sensation changes, shortness of breath, or other concerns.    No driving while taking narcotic pain medication.    Transition to ibuprofen or tylenol/acetaminophen for pain control. Do not take tylenol/acetaminophen and acetaminophen containing narcotic (e.g., percocet or vicodin) at the same time. If you have known ulcer problems, or kidney trouble (elevated creatinine) do not take the ibuprofen.    In emergencies, call [phone]    For other Questions or Concerns;   A.) During weekday working hours (Monday through Friday 8am to 4:30pm)   call [phone] and ask to speak to a clinical nurse specialist.     B.) At nights (after 4:30pm), on weekends, or if urgent call [phone] and   tell the  \"I would like to page job code [phone], the thoracic surgery   fellow on call, please.\"     Reason for your hospital stay   Order Comments: Esophageal perforation after foreign body ingestion     Tubes   Order Comments: Esophageal stent instructions:    -Sleep with the head of your bed elevated to help prevent reflux that may be caused by your new esophageal stent    -Remain upright for 1-2 " hours after eating as well to help prevent reflux     -Call for fever greater than 101.5, chills, chest/neck pain, or if you notice air build up under the skin in your neck or chest.     Diet   Order Comments: -DIET: Clear liquids for one day after discharge, Full liquids for 2 days thereafter, then progress to a regular diet as tolerated.    -Call for fever greater than 101.5, chills, chest/neck pain, or if you notice air build up under the skin in your neck or chest.     Order Specific Question Answer Comments   Is discharge order? Yes        DISCHARGE MEDICATIONS:   Current Discharge Medication List      START taking these medications    Details   clindamycin (CLEOCIN) 75 MG/5ML solution Take 20 mLs (300 mg) by mouth every 6 hours for 7 days  Qty: 560 mL, Refills: 0    Associated Diagnoses: Swallowed foreign body, initial encounter      docusate (COLACE) 50 MG/5ML liquid Take 10 mLs (100 mg) by mouth 2 times daily for 15 days  Qty: 300 mL, Refills: 0    Associated Diagnoses: Swallowed foreign body, initial encounter      fluconazole (DIFLUCAN) 40 MG/ML suspension Take 5 mLs (200 mg) by mouth daily for 7 days  Qty: 35 mL, Refills: 0    Associated Diagnoses: Swallowed foreign body, initial encounter      ibuprofen (ADVIL/MOTRIN) 100 MG/5ML suspension Take 20 mLs (400 mg) by mouth every 6 hours as needed for moderate pain  Qty: 473 mL, Refills: 1    Associated Diagnoses: Swallowed foreign body, initial encounter      levofloxacin (LEVAQUIN) 25 MG/ML solution Take 20 mLs (500 mg) by mouth daily for 7 days  Qty: 140 mL, Refills: 0    Associated Diagnoses: Swallowed foreign body, initial encounter      menthol (ICY HOT) 5 % PTCH Apply 1 patch topically every 8 hours as needed for muscle soreness  Qty: 10 each, Refills: 1    Associated Diagnoses: Swallowed foreign body, initial encounter      omeprazole (PRILOSEC) 2 mg/mL suspension Take 10 mLs (20 mg) by mouth every morning (before breakfast)  Qty: 300 mL, Refills: 0     Associated Diagnoses: Swallowed foreign body, initial encounter      ondansetron (ZOFRAN-ODT) 8 MG ODT tab Take 1 tablet (8 mg) by mouth every 6 hours as needed for nausea or vomiting  Qty: 20 tablet, Refills: 1    Associated Diagnoses: Swallowed foreign body, initial encounter      oxyCODONE (ROXICODONE) 5 MG/5ML solution Take 5 mLs (5 mg) by mouth every 4 hours as needed for severe pain  Qty: 200 mL, Refills: 0    Associated Diagnoses: Swallowed foreign body, initial encounter         CONTINUE these medications which have NOT CHANGED    Details   acetaminophen (TYLENOL) 500 MG tablet Take 2 tablets (1,000 mg) by mouth every 6 hours as needed for pain or headache      albuterol (PROAIR HFA) 108 (90 Base) MCG/ACT inhaler Inhale 2 puffs into the lungs 4 times daily as needed       busPIRone (BUSPAR) 10 MG tablet Take 1 tablet (10 mg) by mouth twice daily      cloNIDine (CATAPRES) 0.1 MG tablet Take 0.1 mg by mouth 2 times daily       desvenlafaxine (PRISTIQ) 100 MG 24 hr tablet Take 1 tablet (100 mg) by mouth every morning      docusate sodium (COLACE) 100 MG capsule Take 1 capsule (100 mg) by mouth twice daily as needed for constipation      gabapentin (NEURONTIN) 300 MG capsule Take 2 capsules (600 mg) by mouth three times daily      lurasidone (LATUDA) 60 MG TABS tablet Take 1 tablet (60 mg) by mouth at bedtime      metFORMIN (GLUCOPHAGE-XR) 500 MG 24 hr tablet Take 1 tablet (500 mg) by mouth daily      topiramate (TOPAMAX) 100 MG tablet Take 1 tablet (100 mg) by mouth daily at bedtime      vitamin D3 (CHOLECALCIFEROL) 2000 units (50 mcg) tablet Take 1 tablet (2,000 units) by mouth daily      diphenhydrAMINE (BENADRYL) 25 MG capsule Take 1-2 capsules (25-50 mg) by mouth every 6 hours as needed for itching or sleep

## 2019-10-10 NOTE — PLAN OF CARE
Patient AVSS. Bedside attendant continues. Oxcodone for pain w/ relief along with iv morphine and Zofran for intermittent nausea. Tolerating clears slowly. Likes peach breeze drinks. Reports loose bm green in color, will reassess cdiff sample parameters in am.

## 2019-10-10 NOTE — ANESTHESIA POSTPROCEDURE EVALUATION
Anesthesia POST Procedure Evaluation    Patient: Nevin Alvarado   MRN:     [id number] Gender:   female   Age:    28 year old :      1990        Preoperative Diagnosis: Esophageal rupture [K22.3]   Procedure(s):  Upper Endoscopy with Suture Placement   Postop Comments: No value filed.       Anesthesia Type:  Not documented  General    Reportable Event: NO     PAIN: Uncomplicated   Sign Out status: Pain at preoperative BASELINE     PONV: No PONV   Sign Out status:  No Nausea or Vomiting     Neuro/Psych: Uneventful perioperative course   Sign Out Status: Preoperative baseline; Age appropriate mentation     Airway/Resp.: Uneventful perioperative course   Sign Out Status: Non labored breathing, age appropriate RR; Resp. Status within EXPECTED Parameters     CV: Uneventful perioperative course   Sign Out status: Appropriate BP and perfusion indices; Appropriate HR/Rhythm     Disposition:   Sign Out in:  PACU  Disposition:  Floor  Recovery Course: Uneventful  Follow-Up: Not required           Last Anesthesia Record Vitals:  CRNA VITALS  10/9/2019 1948 - 10/9/2019 2048      10/9/2019             NIBP:  (!) 165/103    Pulse:  74    SpO2:  100 %    Resp Rate (observed):  10          Last PACU Vitals:  Vitals Value Taken Time   /102 10/9/2019  9:30 PM   Temp 36.9  C (98.5  F) 10/9/2019  8:45 PM   Pulse 76 10/9/2019  9:30 PM   Resp 12 10/9/2019  9:30 PM   SpO2 98 % 10/9/2019  9:40 PM   Temp src     NIBP 165/103 10/9/2019  8:23 PM   Pulse 74 10/9/2019  8:23 PM   SpO2 100 % 10/9/2019  8:23 PM   Resp     Temp     Ht Rate     Temp 2     Vitals shown include unvalidated device data.      Electronically Signed By: Hugo Moe DO, 2019, 9:43 PM

## 2019-10-11 VITALS
BODY MASS INDEX: 49.47 KG/M2 | HEIGHT: 62 IN | WEIGHT: 268.8 LBS | DIASTOLIC BLOOD PRESSURE: 92 MMHG | TEMPERATURE: 97.3 F | RESPIRATION RATE: 18 BRPM | SYSTOLIC BLOOD PRESSURE: 138 MMHG | OXYGEN SATURATION: 97 % | HEART RATE: 63 BPM

## 2019-10-11 LAB
ANION GAP SERPL CALCULATED.3IONS-SCNC: 6 MMOL/L (ref 3–14)
BUN SERPL-MCNC: 14 MG/DL (ref 7–30)
C DIFF TOX B STL QL: NEGATIVE
CALCIUM SERPL-MCNC: 8.6 MG/DL (ref 8.5–10.1)
CHLORIDE SERPL-SCNC: 112 MMOL/L (ref 94–109)
CO2 SERPL-SCNC: 24 MMOL/L (ref 20–32)
CREAT SERPL-MCNC: 0.58 MG/DL (ref 0.52–1.04)
ERYTHROCYTE [DISTWIDTH] IN BLOOD BY AUTOMATED COUNT: 15 % (ref 10–15)
GFR SERPL CREATININE-BSD FRML MDRD: >90 ML/MIN/{1.73_M2}
GLUCOSE SERPL-MCNC: 141 MG/DL (ref 70–99)
HCT VFR BLD AUTO: 35 % (ref 35–47)
HGB BLD-MCNC: 10.8 G/DL (ref 11.7–15.7)
MCH RBC QN AUTO: 28.6 PG (ref 26.5–33)
MCHC RBC AUTO-ENTMCNC: 30.9 G/DL (ref 31.5–36.5)
MCV RBC AUTO: 93 FL (ref 78–100)
PLATELET # BLD AUTO: 238 10E9/L (ref 150–450)
POTASSIUM SERPL-SCNC: 3.1 MMOL/L (ref 3.4–5.3)
RBC # BLD AUTO: 3.78 10E12/L (ref 3.8–5.2)
SODIUM SERPL-SCNC: 141 MMOL/L (ref 133–144)
SPECIMEN SOURCE: NORMAL
WBC # BLD AUTO: 8.6 10E9/L (ref 4–11)

## 2019-10-11 PROCEDURE — 25000132 ZZH RX MED GY IP 250 OP 250 PS 637: Performed by: STUDENT IN AN ORGANIZED HEALTH CARE EDUCATION/TRAINING PROGRAM

## 2019-10-11 PROCEDURE — 87493 C DIFF AMPLIFIED PROBE: CPT | Performed by: PHYSICIAN ASSISTANT

## 2019-10-11 PROCEDURE — 25000132 ZZH RX MED GY IP 250 OP 250 PS 637: Performed by: PHYSICIAN ASSISTANT

## 2019-10-11 PROCEDURE — 25000132 ZZH RX MED GY IP 250 OP 250 PS 637: Performed by: THORACIC SURGERY (CARDIOTHORACIC VASCULAR SURGERY)

## 2019-10-11 PROCEDURE — 25000132 ZZH RX MED GY IP 250 OP 250 PS 637: Performed by: SURGERY

## 2019-10-11 PROCEDURE — 36415 COLL VENOUS BLD VENIPUNCTURE: CPT | Performed by: STUDENT IN AN ORGANIZED HEALTH CARE EDUCATION/TRAINING PROGRAM

## 2019-10-11 PROCEDURE — 85027 COMPLETE CBC AUTOMATED: CPT | Performed by: STUDENT IN AN ORGANIZED HEALTH CARE EDUCATION/TRAINING PROGRAM

## 2019-10-11 PROCEDURE — 80048 BASIC METABOLIC PNL TOTAL CA: CPT | Performed by: STUDENT IN AN ORGANIZED HEALTH CARE EDUCATION/TRAINING PROGRAM

## 2019-10-11 PROCEDURE — 25000128 H RX IP 250 OP 636: Performed by: SURGERY

## 2019-10-11 RX ORDER — FLUCONAZOLE 40 MG/ML
200 POWDER, FOR SUSPENSION ORAL DAILY
Qty: 35 ML | Refills: 0 | Status: ON HOLD | OUTPATIENT
Start: 2019-10-11 | End: 2019-11-04

## 2019-10-11 RX ORDER — IBUPROFEN 100 MG/5ML
400 SUSPENSION, ORAL (FINAL DOSE FORM) ORAL EVERY 6 HOURS PRN
Qty: 473 ML | Refills: 1 | Status: ON HOLD | OUTPATIENT
Start: 2019-10-11 | End: 2019-11-05

## 2019-10-11 RX ORDER — LEVOFLOXACIN 25 MG/ML
500 SOLUTION ORAL DAILY
Qty: 140 ML | Refills: 0 | Status: ON HOLD | OUTPATIENT
Start: 2019-10-11 | End: 2019-11-04

## 2019-10-11 RX ORDER — CLINDAMYCIN PALMITATE HYDROCHLORIDE 75 MG/5ML
300 SOLUTION ORAL EVERY 6 HOURS
Qty: 560 ML | Refills: 0 | Status: ON HOLD | OUTPATIENT
Start: 2019-10-11 | End: 2019-11-04

## 2019-10-11 RX ORDER — OXYCODONE HCL 5 MG/5 ML
5 SOLUTION, ORAL ORAL EVERY 4 HOURS PRN
Qty: 200 ML | Refills: 0 | Status: ON HOLD | OUTPATIENT
Start: 2019-10-11 | End: 2019-11-04

## 2019-10-11 RX ORDER — POTASSIUM CHLORIDE 750 MG/1
40 TABLET, EXTENDED RELEASE ORAL ONCE
Status: COMPLETED | OUTPATIENT
Start: 2019-10-11 | End: 2019-10-11

## 2019-10-11 RX ORDER — LOPERAMIDE HCL 2 MG
2 CAPSULE ORAL ONCE
Status: COMPLETED | OUTPATIENT
Start: 2019-10-11 | End: 2019-10-11

## 2019-10-11 RX ORDER — ONDANSETRON 8 MG/1
8 TABLET, ORALLY DISINTEGRATING ORAL EVERY 6 HOURS PRN
Qty: 20 TABLET | Refills: 1 | Status: SHIPPED | OUTPATIENT
Start: 2019-10-11 | End: 2020-01-18

## 2019-10-11 RX ADMIN — CLINDAMYCIN PALMITATE HYDROCHLORIDE 300 MG: 75 SOLUTION ORAL at 08:10

## 2019-10-11 RX ADMIN — IBUPROFEN 400 MG: 200 SUSPENSION ORAL at 11:26

## 2019-10-11 RX ADMIN — FLUCONAZOLE 200 MG: 40 POWDER, FOR SUSPENSION ORAL at 08:10

## 2019-10-11 RX ADMIN — BUSPIRONE HYDROCHLORIDE 10 MG: 5 TABLET ORAL at 08:11

## 2019-10-11 RX ADMIN — GABAPENTIN 600 MG: 600 TABLET, FILM COATED ORAL at 08:11

## 2019-10-11 RX ADMIN — OXYCODONE HYDROCHLORIDE 5 MG: 5 SOLUTION ORAL at 11:57

## 2019-10-11 RX ADMIN — MORPHINE SULFATE 2 MG: 2 INJECTION, SOLUTION INTRAMUSCULAR; INTRAVENOUS at 10:58

## 2019-10-11 RX ADMIN — CLINDAMYCIN PALMITATE HYDROCHLORIDE 300 MG: 75 SOLUTION ORAL at 14:00

## 2019-10-11 RX ADMIN — LOPERAMIDE HYDROCHLORIDE 2 MG: 2 CAPSULE ORAL at 14:00

## 2019-10-11 RX ADMIN — POTASSIUM CHLORIDE 40 MEQ: 750 TABLET, EXTENDED RELEASE ORAL at 13:56

## 2019-10-11 RX ADMIN — DESVENLAFAXINE 100 MG: 50 TABLET, FILM COATED, EXTENDED RELEASE ORAL at 08:12

## 2019-10-11 RX ADMIN — LEVOFLOXACIN 500 MG: 25 SOLUTION ORAL at 08:10

## 2019-10-11 RX ADMIN — CLONIDINE HYDROCHLORIDE 0.1 MG: 0.1 TABLET ORAL at 08:11

## 2019-10-11 RX ADMIN — PANTOPRAZOLE SODIUM 40 MG: 40 TABLET, DELAYED RELEASE ORAL at 08:10

## 2019-10-11 RX ADMIN — OXYCODONE HYDROCHLORIDE 5 MG: 5 SOLUTION ORAL at 08:08

## 2019-10-11 RX ADMIN — CLINDAMYCIN PALMITATE HYDROCHLORIDE 300 MG: 75 SOLUTION ORAL at 01:52

## 2019-10-11 RX ADMIN — GABAPENTIN 600 MG: 600 TABLET, FILM COATED ORAL at 13:56

## 2019-10-11 RX ADMIN — MORPHINE SULFATE 2 MG: 2 INJECTION, SOLUTION INTRAMUSCULAR; INTRAVENOUS at 06:58

## 2019-10-11 RX ADMIN — ENOXAPARIN SODIUM 40 MG: 40 INJECTION SUBCUTANEOUS at 10:58

## 2019-10-11 ASSESSMENT — ACTIVITIES OF DAILY LIVING (ADL)
ADLS_ACUITY_SCORE: 8
ADLS_ACUITY_SCORE: 10
ADLS_ACUITY_SCORE: 8
ADLS_ACUITY_SCORE: 8

## 2019-10-11 ASSESSMENT — PAIN DESCRIPTION - DESCRIPTORS: DESCRIPTORS: ACHING;DISCOMFORT;SORE

## 2019-10-11 NOTE — PROGRESS NOTES
GI Progress Note  Date of Service:  10/10/2019          GASTROENTEROLOGY PROGRESS NOTE    Date of Admission: 10/6/2019    ASSESSMENT:  27yo F w/ history of severe psychiatric disease and recurrent foreign body ingestion who presented to OSH after swallowing straightened paperclips. EGD at OSH attempted removal w/ complication of paperclip impaling esophageal wall and causing esophageal perforation seen on CT scan. A snare was trapped in the esophageal wall w/ the paperclip and unable to be removed, thus the snare was cut off and the patient transferred to [facility] for further evaluation and management on night of 10/7/19.   EGD 10/7/19: Two straightened paperclips w/ attached snare removed. Large esophageal perforation from 26-32cm confirmed and a fully covered stent was placed by Dr. Espana and anchored by two endoclips.   EGD 10/9/19:  Uncomplicated fixation of the in situ well-positioned fully expanded esophageal stent using 2 endoscopic sutures by Dr. Ramirez and Dr. Nielsen.       RECOMMENDATIONS:  --Diet and stent followup recommendations per Dr. Espana     The patient was discussed and plan agreed upon with GI staff, Dr. Rogers.       Ana Laura Paz MD, [facility]  Gastroenterology Fellow, PGY4  Pager *[phone]  _______________________________________________________________      Subjective: Nursing notes and 24hr events reviewed. Patient seen and examined at 10am. Pt is sitting in bed, states that her pain is primarily in her upper back, controlled with pain meds. She denies fevers, chills, nausea, vomiting, bleeding, abdominal pain. She is hungry. She currently has a 1:1 sitter. She states that the situation was very traumatizing, because she was not fully sedated when the paperclip was being removed and could feel the paperclip tearing her esophagus. She states that she tried to talk (through bite block) and then tried to reach up to grab someone's hand unsuccessfully. She states that she is currently  "seeking mental health help and is working hard not to swallow foreign objects again.     ROS:   4 pt ROS negative unless noted in subjective.     Objective:  Blood pressure 128/88, pulse 76, temperature 97.1  F (36.2  C), temperature source Oral, resp. rate 16, height 1.575 m (5' 2\"), weight 121.9 kg (268 lb 12.8 oz), SpO2 98 %, not currently breastfeeding.  Gen: Sitting in bed.  HEENT: NCAT. Conjunctiva clear. Sclera anicteric  CV: Normal rate, regular rhythm.   Resp: Non-labored on room air   Abd: Soft, NT, ND, no guarding or rebound  Ext: warm, well perfused  Neuro: grossly normal  Mental status/Psych: A&O. Asks/answers questions appropriately     Date 10/10/19 0700 - 10/11/19 0659   Shift 9877-5289 2720-7172 9901-3367 24 Hour Total   INTAKE   P.O. 770 1120  1890   I.V. 100   100   Shift Total(mL/kg) 870(7.16) 1120(9.19)  1990(16.32)   OUTPUT   Urine 1   1   Shift Total(mL/kg) 1(0.01)   1(0.01)   Weight (kg) 121.56 121.93 121.93 121.93         LABS:  BMP  Recent Labs   Lab 10/10/19  0646 10/09/19  0719 10/08/19  1045 10/07/19  0805    139 140 137   POTASSIUM 3.6 3.5 3.4 4.0   CHLORIDE 107 108 108 108   KELBY 8.8 8.4* 8.6 8.8   CO2 24 23 25 20   BUN 8 14 14 10   CR 0.50* 0.60 0.63 0.64   * 85 107* 162*     CBC  Recent Labs   Lab 10/10/19  0646 10/09/19  0719 10/08/19  1045 10/07/19  0805   WBC 7.1 7.8 11.3* 21.2*   RBC 3.80 3.71* 3.64* 4.42   HGB 10.7* 10.6* 10.3* 12.7   HCT 34.7* 33.3* 33.2* 40.3   MCV 91 90 91 91   MCH 28.2 28.6 28.3 28.7   MCHC 30.8* 31.8 31.0* 31.5   RDW 14.6 15.1* 14.7 14.5    194 197 218     INR  Recent Labs   Lab 10/06/19  2300   INR 1.16*     LFTs  Recent Labs   Lab 10/06/19  2300   ALKPHOS 74   AST 16   ALT 23   BILITOTAL 0.2   PROTTOTAL 8.0   ALBUMIN 3.3*            "

## 2019-10-11 NOTE — PLAN OF CARE
Pt A&Ox4. Flat affect. Denies suicide ideation. On suicide precautions w/ attendant at bedside. VSS on RA. C/o back and left side pain, prn ibuprophen and sched oxy given w/ relief. No c/o nausea. Tolerates CL diet well w/ good intake. Port running TKo, dressing changed this evening. Voiding WDL. No BM this shift, pt reports loose stools and refused evening senna. Showered this evening. Able to make needs known. Possibly discharge home tomorrow. Will continue w/ POC.

## 2019-10-11 NOTE — PLAN OF CARE
Patient AVSS. Ready for discharge to home today. Discharge instructions reviewed with patient. Medications reviewed. Home care agency will follow up w/ patient after the weekend. Patient encouraged to call with questions or concerns, given numbers and will follow up with her primary md & thoracic. Given diet guidelines for full liquids and soft foods to start with when she advances to regular. Continues w/ loose md alexi ordered cdiff and sample sent. Will deaccess Port prior to discharge. Oxy and ibuprofen for pain with good control. HARIS sats

## 2019-10-11 NOTE — PLAN OF CARE
"Vital signs:  Temp: 97.1  F (36.2  C) Temp src: Oral BP: 128/88   Heart Rate: 83 Resp: 16 SpO2: 98 % O2 Device: Nasal cannula Oxygen Delivery: 2 LPM Height: 157.5 cm (5' 2\") Weight: 121.9 kg (268 lb 12.8 oz)    Activity: SBA  Neuros: WDL this shift, sleeping in bed, sitter in room  Cardiac: WDL  Respiratory: WDL  GI/: C/o adb discomfort. Voiding adequately  Diet: Clear liquids  Lines: Port a cath TKO with int abx.   Pain/nausea: Scheduled 5mg oral solution oxy q4       "

## 2019-10-12 ENCOUNTER — COMMUNICATION - HEALTHEAST (OUTPATIENT)
Dept: SCHEDULING | Facility: CLINIC | Age: 29
End: 2019-10-12

## 2019-10-12 ENCOUNTER — HOSPITAL ENCOUNTER (EMERGENCY)
Facility: CLINIC | Age: 29
Discharge: HOME | DRG: 392 | End: 2019-10-12
Attending: EMERGENCY MEDICINE | Admitting: EMERGENCY MEDICINE
Payer: COMMERCIAL

## 2019-10-12 ENCOUNTER — APPOINTMENT (OUTPATIENT)
Dept: CT IMAGING | Facility: CLINIC | Age: 29
DRG: 392 | End: 2019-10-12
Attending: EMERGENCY MEDICINE
Payer: COMMERCIAL

## 2019-10-12 VITALS
SYSTOLIC BLOOD PRESSURE: 143 MMHG | OXYGEN SATURATION: 97 % | DIASTOLIC BLOOD PRESSURE: 97 MMHG | TEMPERATURE: 98.1 F | RESPIRATION RATE: 16 BRPM | HEART RATE: 79 BPM

## 2019-10-12 DIAGNOSIS — K22.3 ESOPHAGEAL PERFORATION: ICD-10-CM

## 2019-10-12 LAB
ANION GAP SERPL CALCULATED.3IONS-SCNC: 6 MMOL/L (ref 3–14)
BASOPHILS # BLD AUTO: 0 10E9/L (ref 0–0.2)
BASOPHILS NFR BLD AUTO: 0.5 %
BUN SERPL-MCNC: 15 MG/DL (ref 7–30)
CALCIUM SERPL-MCNC: 8.5 MG/DL (ref 8.5–10.1)
CHLORIDE SERPL-SCNC: 111 MMOL/L (ref 94–109)
CO2 SERPL-SCNC: 27 MMOL/L (ref 20–32)
CREAT SERPL-MCNC: 0.76 MG/DL (ref 0.52–1.04)
DIFFERENTIAL METHOD BLD: ABNORMAL
EOSINOPHIL # BLD AUTO: 0.6 10E9/L (ref 0–0.7)
EOSINOPHIL NFR BLD AUTO: 7.6 %
ERYTHROCYTE [DISTWIDTH] IN BLOOD BY AUTOMATED COUNT: 15.3 % (ref 10–15)
GFR SERPL CREATININE-BSD FRML MDRD: >90 ML/MIN/{1.73_M2}
GLUCOSE SERPL-MCNC: 101 MG/DL (ref 70–99)
HCT VFR BLD AUTO: 36 % (ref 35–47)
HGB BLD-MCNC: 11 G/DL (ref 11.7–15.7)
IMM GRANULOCYTES # BLD: 0 10E9/L (ref 0–0.4)
IMM GRANULOCYTES NFR BLD: 0.3 %
LYMPHOCYTES # BLD AUTO: 2 10E9/L (ref 0.8–5.3)
LYMPHOCYTES NFR BLD AUTO: 26 %
MCH RBC QN AUTO: 28.3 PG (ref 26.5–33)
MCHC RBC AUTO-ENTMCNC: 30.6 G/DL (ref 31.5–36.5)
MCV RBC AUTO: 93 FL (ref 78–100)
MONOCYTES # BLD AUTO: 0.6 10E9/L (ref 0–1.3)
MONOCYTES NFR BLD AUTO: 8.2 %
NEUTROPHILS # BLD AUTO: 4.5 10E9/L (ref 1.6–8.3)
NEUTROPHILS NFR BLD AUTO: 57.4 %
NRBC # BLD AUTO: 0 10*3/UL
NRBC BLD AUTO-RTO: 0 /100
PLATELET # BLD AUTO: 222 10E9/L (ref 150–450)
POTASSIUM SERPL-SCNC: 3.5 MMOL/L (ref 3.4–5.3)
RBC # BLD AUTO: 3.89 10E12/L (ref 3.8–5.2)
SODIUM SERPL-SCNC: 144 MMOL/L (ref 133–144)
WBC # BLD AUTO: 7.9 10E9/L (ref 4–11)

## 2019-10-12 PROCEDURE — 80048 BASIC METABOLIC PNL TOTAL CA: CPT | Performed by: EMERGENCY MEDICINE

## 2019-10-12 PROCEDURE — 99285 EMERGENCY DEPT VISIT HI MDM: CPT | Mod: Z6 | Performed by: EMERGENCY MEDICINE

## 2019-10-12 PROCEDURE — 96375 TX/PRO/DX INJ NEW DRUG ADDON: CPT | Performed by: EMERGENCY MEDICINE

## 2019-10-12 PROCEDURE — 25000128 H RX IP 250 OP 636: Performed by: EMERGENCY MEDICINE

## 2019-10-12 PROCEDURE — 96361 HYDRATE IV INFUSION ADD-ON: CPT | Performed by: EMERGENCY MEDICINE

## 2019-10-12 PROCEDURE — 25000125 ZZHC RX 250: Performed by: EMERGENCY MEDICINE

## 2019-10-12 PROCEDURE — 36415 COLL VENOUS BLD VENIPUNCTURE: CPT | Performed by: EMERGENCY MEDICINE

## 2019-10-12 PROCEDURE — 99284 EMERGENCY DEPT VISIT MOD MDM: CPT | Mod: 25 | Performed by: EMERGENCY MEDICINE

## 2019-10-12 PROCEDURE — 96376 TX/PRO/DX INJ SAME DRUG ADON: CPT | Performed by: EMERGENCY MEDICINE

## 2019-10-12 PROCEDURE — 85025 COMPLETE CBC W/AUTO DIFF WBC: CPT | Performed by: EMERGENCY MEDICINE

## 2019-10-12 PROCEDURE — 96365 THER/PROPH/DIAG IV INF INIT: CPT | Performed by: EMERGENCY MEDICINE

## 2019-10-12 PROCEDURE — 71250 CT THORAX DX C-: CPT

## 2019-10-12 RX ORDER — HYDROMORPHONE HYDROCHLORIDE 1 MG/ML
1 INJECTION, SOLUTION INTRAMUSCULAR; INTRAVENOUS; SUBCUTANEOUS ONCE
Status: COMPLETED | OUTPATIENT
Start: 2019-10-12 | End: 2019-10-12

## 2019-10-12 RX ORDER — CLINDAMYCIN IN PERCENT DEXTROSE 6 MG/ML
300 INJECTION, SOLUTION INTRAVENOUS ONCE
Status: COMPLETED | OUTPATIENT
Start: 2019-10-12 | End: 2019-10-12

## 2019-10-12 RX ORDER — ONDANSETRON 2 MG/ML
4 INJECTION INTRAMUSCULAR; INTRAVENOUS ONCE
Status: COMPLETED | OUTPATIENT
Start: 2019-10-12 | End: 2019-10-12

## 2019-10-12 RX ORDER — HYDROMORPHONE HYDROCHLORIDE 1 MG/ML
0.5 INJECTION, SOLUTION INTRAMUSCULAR; INTRAVENOUS; SUBCUTANEOUS ONCE
Status: COMPLETED | OUTPATIENT
Start: 2019-10-12 | End: 2019-10-12

## 2019-10-12 RX ADMIN — HYDROMORPHONE HYDROCHLORIDE 1 MG: 1 INJECTION, SOLUTION INTRAMUSCULAR; INTRAVENOUS; SUBCUTANEOUS at 18:25

## 2019-10-12 RX ADMIN — CLINDAMYCIN PHOSPHATE 300 MG: 300 INJECTION, SOLUTION INTRAVENOUS at 18:58

## 2019-10-12 RX ADMIN — ONDANSETRON 4 MG: 2 INJECTION INTRAMUSCULAR; INTRAVENOUS at 15:49

## 2019-10-12 RX ADMIN — SODIUM CHLORIDE 1000 ML: 9 INJECTION, SOLUTION INTRAVENOUS at 15:48

## 2019-10-12 RX ADMIN — HYDROMORPHONE HYDROCHLORIDE 0.5 MG: 1 INJECTION, SOLUTION INTRAMUSCULAR; INTRAVENOUS; SUBCUTANEOUS at 15:37

## 2019-10-12 RX ADMIN — HYDROMORPHONE HYDROCHLORIDE 0.5 MG: 1 INJECTION, SOLUTION INTRAMUSCULAR; INTRAVENOUS; SUBCUTANEOUS at 14:18

## 2019-10-12 NOTE — ED PROVIDER NOTES
[place] EMERGENCY DEPARTMENT ([facility])  10/12/19   History     Chief Complaint   Patient presents with     Shortness of Breath     HPI  Nevin Alvarado is a 28 year old female who has a PMHx of self-injurious behavior including foreign body ingestions, placing objects inside rectum needing surgical extraction and overdoses, extensive psychiatric history including anorexia nervosa with bulimia, BPD, PTSD, MDD amongst others, who presents to the Emergency Department via EMS for evaluation of shortness of breath and pain with swallowing in the setting of an esophageal perforation last week.     I have reviewed the Medications, Allergies, Past Medical and Surgical History, and Social History in the Augment system.    Past Medical History:   Diagnosis Date     ADD (attention deficit disorder)      Anorexia nervosa with bulimia     history of; on Topamax     Anxiety      Borderline personality disorder (H)      Depression      Depressive disorder      H/O self-harm      Lives in independent group home     due to debilitating mental illness     Migraine without aura     no known triggers; on Topamax bid and Imitrex PRN     Morbid obesity (H)      PTSD (post-traumatic stress disorder)      Rectal foreign body - Recurrent issue, self placed      Swallowed foreign body - Recurrent issue, self placed        Past Surgical History:   Procedure Laterality Date     COMBINED ESOPHAGOSCOPY, GASTROSCOPY, DUODENOSCOPY (EGD), REPLACE ESOPHAGEAL STENT N/A 10/9/2019    Procedure: Upper Endoscopy with Suture Placement;  Surgeon: Zurdo Ramirez MD;  Location: [facility]     ESOPHAGOSCOPY, GASTROSCOPY, DUODENOSCOPY (EGD), COMBINED N/A 3/9/2017    Procedure: COMBINED ESOPHAGOSCOPY, GASTROSCOPY, DUODENOSCOPY (EGD), REMOVE FOREIGN BODY;  Surgeon: Avis Guzmán MD;  Location: [facility] OR     ESOPHAGOSCOPY, GASTROSCOPY, DUODENOSCOPY (EGD), COMBINED N/A 4/20/2017    Procedure: COMBINED ESOPHAGOSCOPY, GASTROSCOPY,  DUODENOSCOPY (EGD), REMOVE FOREIGN BODY;  EGD removal Foregin body;  Surgeon: Lokesh Paula MD;  Location: [facility]     ESOPHAGOSCOPY, GASTROSCOPY, DUODENOSCOPY (EGD), COMBINED N/A 6/12/2017    Procedure: COMBINED ESOPHAGOSCOPY, GASTROSCOPY, DUODENOSCOPY (EGD);  COMBINED ESOPHAGOSCOPY, GASTROSCOPY, DUODENOSCOPY (EGD) [[id number]]attempted removal of foreign body;  Surgeon: Pamela Perez MD;  [facility]     ESOPHAGOSCOPY, GASTROSCOPY, DUODENOSCOPY (EGD), COMBINED N/A 6/9/2017    Procedure: COMBINED ESOPHAGOSCOPY, GASTROSCOPY, DUODENOSCOPY (EGD), REMOVE FOREIGN BODY;  Esophagoscopy, Gastroscopy, Duodenoscopy, Removal of Foreign Body;  Surgeon: Dejon Marsh MD;  Location: [facility]     ESOPHAGOSCOPY, GASTROSCOPY, DUODENOSCOPY (EGD), COMBINED N/A 1/6/2018    Procedure: COMBINED ESOPHAGOSCOPY, GASTROSCOPY, DUODENOSCOPY (EGD), REMOVE FOREIGN BODY;  COMBINED ESOPHAGOSCOPY, GASTROSCOPY, DUODENOSCOPY (EGD) [by pascal net and snare with profol sedation;  Surgeon: Feliciano Emmanuel MD;  Location:  GI     ESOPHAGOSCOPY, GASTROSCOPY, DUODENOSCOPY (EGD), COMBINED N/A 3/19/2018    Procedure: COMBINED ESOPHAGOSCOPY, GASTROSCOPY, DUODENOSCOPY (EGD), REMOVE FOREIGN BODY;   Esophagodscopy, Gastroscopy, Duodenoscopy,Foreign Body Removal;  Surgeon: Brice Guzmán MD;  Location: [facility]     ESOPHAGOSCOPY, GASTROSCOPY, DUODENOSCOPY (EGD), COMBINED N/A 4/16/2018    Procedure: COMBINED ESOPHAGOSCOPY, GASTROSCOPY, DUODENOSCOPY (EGD), REMOVE FOREIGN BODY;  Esophagogastroduodenoscopy  Foreign Body Removal X 2;  Surgeon: Ryoer Moise MD;  [facility]     ESOPHAGOSCOPY, GASTROSCOPY, DUODENOSCOPY (EGD), COMBINED N/A 6/1/2018    Procedure: COMBINED ESOPHAGOSCOPY, GASTROSCOPY, DUODENOSCOPY (EGD), REMOVE FOREIGN BODY;  COMBINED ESOPHAGOSCOPY, GASTROSCOPY, DUODENOSCOPY with removal of foreign body, propofol sedation by anesthesia;  Surgeon: Brice Martinez MD;  Location: [facility]      ESOPHAGOSCOPY, GASTROSCOPY, DUODENOSCOPY (EGD), COMBINED N/A 7/25/2018    Procedure: COMBINED ESOPHAGOSCOPY, GASTROSCOPY, DUODENOSCOPY (EGD), REMOVE FOREIGN BODY;;  Surgeon: Candy Castelan MD;  Location: [facility] GI     ESOPHAGOSCOPY, GASTROSCOPY, DUODENOSCOPY (EGD), COMBINED N/A 7/28/2018    Procedure: COMBINED ESOPHAGOSCOPY, GASTROSCOPY, DUODENOSCOPY (EGD), REMOVE FOREIGN BODY;  COMBINED ESOPHAGOSCOPY, GASTROSCOPY, DUODENOSCOPY (EGD), REMOVE FOREIGN BODY;  Surgeon: Brice Guzmán MD;  [facility]     ESOPHAGOSCOPY, GASTROSCOPY, DUODENOSCOPY (EGD), COMBINED N/A 7/31/2018    Procedure: COMBINED ESOPHAGOSCOPY, GASTROSCOPY, DUODENOSCOPY (EGD);  COMBINED ESOPHAGOSCOPY, GASTROSCOPY, DUODENOSCOPY (EGD) TO REMOVE FOREIGN BODY;  Surgeon: Lokesh Paula MD;  [facility]     ESOPHAGOSCOPY, GASTROSCOPY, DUODENOSCOPY (EGD), COMBINED N/A 8/4/2018    Procedure: COMBINED ESOPHAGOSCOPY, GASTROSCOPY, DUODENOSCOPY (EGD), REMOVE FOREIGN BODY;   combined esophagoscopy, gastroscopy, duodenoscopy, REMOVE FOREIGN BODY ;  Surgeon: Lokesh Paula MD;  [facility]     ESOPHAGOSCOPY, GASTROSCOPY, DUODENOSCOPY (EGD), COMBINED N/A 10/6/2019    Procedure: ESOPHAGOGASTRODUODENOSCOPY (EGD) with fireign body removal x2, esophageal stent placement with floroscopy;  Surgeon: Timoteo Espana MD;  [facility]     EXAM UNDER ANESTHESIA ANUS N/A 1/10/2017    Procedure: EXAM UNDER ANESTHESIA ANUS;  Surgeon: Annmarie Haynes MD;  [facility]     EXAM UNDER ANESTHESIA RECTUM N/A 7/19/2018    Procedure: EXAM UNDER ANESTHESIA RECTUM;  EXAM UNDER ANESTHESIA, REMOVAL OF RECTAL FOREIGN BODY;  Surgeon: Annmarie Haynes MD;  Location: [facility]     HC REMOVE FECAL IMPACTION OR FB W ANESTHESIA N/A 12/18/2016    Procedure: REMOVE FECAL IMPACTION/FOREIGN BODY UNDER ANESTHESIA;  Surgeon: Soham Cano MD;  Location: [facility] OR     KNEE SURGERY - removed a small tissue mass from knee Right      LAPAROSCOPIC  ABLATION ENDOMETRIOSIS       LAPAROTOMY EXPLORATORY N/A 1/10/2017    Procedure: LAPAROTOMY EXPLORATORY;  Surgeon: Annmarie Haynes MD;  Location: [facility] OR     lymph nodes removed from neck; benign  age 7     MAMMOPLASTY REDUCTION Bilateral      RELEASE CARPAL TUNNEL Bilateral      SIGMOIDOSCOPY FLEXIBLE N/A 1/10/2017    Procedure: SIGMOIDOSCOPY FLEXIBLE;  Surgeon: Annmarie Haynes MD;  Location: [facility]     SIGMOIDOSCOPY FLEXIBLE N/A 5/8/2018    Procedure: SIGMOIDOSCOPY FLEXIBLE;  flex sig with foreign body removal using snare and rattooth forcep;  Surgeon: Soham Cano MD;  Location: [facility] GI     SIGMOIDOSCOPY FLEXIBLE N/A 2/20/2019    Procedure: Exam under anesthesia Colonoscopy with attempted  removal of rectal foreign body;  Surgeon: Estrada Chávez MD;  Location: [facility]       Family History   Problem Relation Age of Onset     Diabetes Type 2  Maternal Grandmother      Diabetes Type 2  Paternal Grandmother      Breast Cancer Paternal Grandmother      Cerebrovascular Disease Father 53     Myocardial Infarction No family hx of      Coronary Artery Disease Early Onset No family hx of      Ovarian Cancer No family hx of      Colon Cancer No family hx of        Social History     Tobacco Use     Smoking status: Never Smoker     Smokeless tobacco: Never Used   Substance Use Topics     Alcohol use: No     Alcohol/week: 0.0 standard drinks       No current facility-administered medications for this encounter.      Current Outpatient Medications   Medication     acetaminophen (TYLENOL) 500 MG tablet     albuterol (PROAIR HFA) 108 (90 Base) MCG/ACT inhaler     busPIRone (BUSPAR) 10 MG tablet     clindamycin (CLEOCIN) 75 MG/5ML solution     cloNIDine (CATAPRES) 0.1 MG tablet     desvenlafaxine (PRISTIQ) 100 MG 24 hr tablet     diphenhydrAMINE (BENADRYL) 25 MG capsule     docusate (COLACE) 50 MG/5ML liquid     docusate sodium (COLACE) 100 MG capsule     fluconazole (DIFLUCAN) 40 MG/ML suspension      gabapentin (NEURONTIN) 300 MG capsule     ibuprofen (ADVIL/MOTRIN) 100 MG/5ML suspension     levofloxacin (LEVAQUIN) 25 MG/ML solution     lurasidone (LATUDA) 60 MG TABS tablet     menthol (ICY HOT) 5 % PTCH     metFORMIN (GLUCOPHAGE-XR) 500 MG 24 hr tablet     omeprazole (PRILOSEC) 2 mg/mL suspension     ondansetron (ZOFRAN-ODT) 8 MG ODT tab     oxyCODONE (ROXICODONE) 5 MG/5ML solution     topiramate (TOPAMAX) 100 MG tablet     vitamin D3 (CHOLECALCIFEROL) 2000 units (50 mcg) tablet        Allergies   Allergen Reactions     Amoxicillin-Pot Clavulanate Other (See Comments), Rash and Swelling     PN: facial swelling, left side. Also had cortisone injection the same day as she started the Augmentin.  PN: facial swelling, left side. Also had cortisone injection the same day as she started the Augmentin.  Noted in downtime recovery of chart.       Penicillins Anaphylaxis     Blood-Group Specific Substance Other (See Comments)     Patient has an anti-Cw and non-specific antibodies. Blood product orders may be delayed. Draw one red top and two purple top tubes for all type/screen/crossmatch orders.     Hydrocodone Nausea and Vomiting     vomiting for days     Influenza Vaccines Other (See Comments)     Oseltamivir Hives     med stopped, PN: med stopped     Vancomycin Swelling     Vicodin [Hydrocodone-Acetaminophen] Nausea and Vomiting     Cephalosporins Rash     Lamotrigine Rash     Possibly associated with Lamictal.      Latex Rash        Review of Systems   All other systems reviewed and are negative.      Physical Exam   BP: (!) 145/95  Pulse: 74  Temp: 98.1  F (36.7  C)  Resp: 19  SpO2: 97 %      Physical Exam  Vitals signs and nursing note reviewed.   Constitutional:       General: She is not in acute distress.     Appearance: She is not diaphoretic.   HENT:      Head: Normocephalic and atraumatic.   Eyes:      Conjunctiva/sclera: Conjunctivae normal.      Pupils: Pupils are equal, round, and reactive to light.    Neck:      Musculoskeletal: Normal range of motion and neck supple.   Cardiovascular:      Rate and Rhythm: Normal rate.   Pulmonary:      Effort: Pulmonary effort is normal. No respiratory distress.      Breath sounds: No wheezing or rales.   Abdominal:      General: There is no distension.      Palpations: Abdomen is soft.      Tenderness: There is no tenderness. There is no guarding or rebound.   Musculoskeletal: Normal range of motion.   Skin:     General: Skin is warm and dry.   Neurological:      Mental Status: She is alert and oriented to person, place, and time.   Psychiatric:         Behavior: Behavior normal.         Thought Content: Thought content normal.         ED Course        Procedures             Critical Care time:  none             Labs Ordered and Resulted from Time of ED Arrival Up to the Time of Departure from the ED - No data to display         Assessments & Plan (with Medical Decision Making)   This is a 28-year-old female status post esophageal stent for esophageal perforation from a foreign body who presents with back pain and shortness of breath.  CT chest showed some pneumomediastinum with no evidence of infection.  Thoracic surgery was consulted and felt this was from her initial injury,   and there is no acute process noted on CT scan.   They recommended continuing her outpatient antibiotics and returning for any worsening symptoms.  She was given her clindamycin dose in the ER prior to discharge.   I have reviewed the nursing notes.    I have reviewed the findings, diagnosis, plan and need for follow up with the patient.    New Prescriptions    No medications on file       Final diagnoses:   None       10/12/2019   [facility], [place], EMERGENCY DEPARTMENT     Florin Andrade MD  10/12/19 2064

## 2019-10-12 NOTE — ED TRIAGE NOTES
Pt BIBA from home  Hx esophageal perf last week (Swallowed paper clip and got lodged, has history per EMS of swallowing foreign objects). Hx of anxiety as well. Was discharged from hospital yesterday. C/o pain in upper back and throat when swallowing, and SOB.Took oxy at home. Denies swallowing any foreign objects since most recent hospitalization. Sating 97% RA, VSS. A&Ox4

## 2019-10-12 NOTE — ED AVS SNAPSHOT
[facility], [place], Emergency Department  [address] [place] [id number]  Phone:  [phone]                                    Nevin Alvarado   MRN: [id number]    Department:  [facility], Emergency Department   Date of Visit:  10/12/2019           After Visit Summary Signature Page    I have received my discharge instructions, and my questions have been answered. I have discussed any challenges I see with this plan with the nurse or doctor.    ..........................................................................................................................................  Patient/Patient Representative Signature      ..........................................................................................................................................  Patient Representative Print Name and Relationship to Patient    ..................................................               ................................................  Date                                   Time    ..........................................................................................................................................  Reviewed by Signature/Title    ...................................................              ..............................................  Date                                               Time          22EPIC Rev 08/18

## 2019-10-12 NOTE — DISCHARGE INSTRUCTIONS
Please continue your antibiotics and follow up with your primary doctor.  Return if any worsening symptoms or fever.

## 2019-10-14 ENCOUNTER — PATIENT OUTREACH (OUTPATIENT)
Dept: CARE COORDINATION | Facility: CLINIC | Age: 29
End: 2019-10-14

## 2019-10-14 ENCOUNTER — TELEPHONE (OUTPATIENT)
Dept: INTERNAL MEDICINE | Facility: CLINIC | Age: 29
End: 2019-10-14

## 2019-10-14 ENCOUNTER — TELEPHONE (OUTPATIENT)
Dept: FAMILY MEDICINE | Facility: CLINIC | Age: 29
End: 2019-10-14

## 2019-10-14 NOTE — PROGRESS NOTES
Patient states that she is still having upper back pain  No shortness of breath   Her throat hurts and it is bothersome to swallow    Patient would like a call in regards to still having pain and she feels like it is not getting better

## 2019-10-15 ENCOUNTER — HOSPITAL ENCOUNTER (INPATIENT)
Facility: CLINIC | Age: 29
LOS: 2 days | Discharge: HOME HEALTH | DRG: 392 | End: 2019-10-17
Attending: EMERGENCY MEDICINE | Admitting: FAMILY MEDICINE
Payer: COMMERCIAL

## 2019-10-15 ENCOUNTER — APPOINTMENT (OUTPATIENT)
Dept: CT IMAGING | Facility: CLINIC | Age: 29
DRG: 392 | End: 2019-10-15
Attending: EMERGENCY MEDICINE
Payer: COMMERCIAL

## 2019-10-15 DIAGNOSIS — K59.00 CONSTIPATION, UNSPECIFIED CONSTIPATION TYPE: ICD-10-CM

## 2019-10-15 DIAGNOSIS — K59.09 OTHER CONSTIPATION: Primary | ICD-10-CM

## 2019-10-15 DIAGNOSIS — T18.5XXD: ICD-10-CM

## 2019-10-15 DIAGNOSIS — E86.0 DEHYDRATION: ICD-10-CM

## 2019-10-15 PROBLEM — R10.13 EPIGASTRIC PAIN: Status: ACTIVE | Noted: 2019-10-15

## 2019-10-15 LAB
ALBUMIN SERPL-MCNC: 3.1 G/DL (ref 3.4–5)
ALBUMIN UR-MCNC: 10 MG/DL
ALP SERPL-CCNC: 63 U/L (ref 40–150)
ALT SERPL W P-5'-P-CCNC: 20 U/L (ref 0–50)
ANION GAP SERPL CALCULATED.3IONS-SCNC: 7 MMOL/L (ref 3–14)
APPEARANCE UR: CLEAR
AST SERPL W P-5'-P-CCNC: 14 U/L (ref 0–45)
BASOPHILS # BLD AUTO: 0 10E9/L (ref 0–0.2)
BASOPHILS NFR BLD AUTO: 0.3 %
BILIRUB SERPL-MCNC: 0.3 MG/DL (ref 0.2–1.3)
BILIRUB UR QL STRIP: NEGATIVE
BUN SERPL-MCNC: 8 MG/DL (ref 7–30)
CALCIUM SERPL-MCNC: 8.9 MG/DL (ref 8.5–10.1)
CHLORIDE SERPL-SCNC: 108 MMOL/L (ref 94–109)
CO2 BLDCOV-SCNC: 23 MMOL/L (ref 21–28)
CO2 SERPL-SCNC: 23 MMOL/L (ref 20–32)
COLOR UR AUTO: YELLOW
CREAT BLD-MCNC: 0.5 MG/DL (ref 0.52–1.04)
CREAT SERPL-MCNC: 0.56 MG/DL (ref 0.52–1.04)
DIFFERENTIAL METHOD BLD: NORMAL
EOSINOPHIL # BLD AUTO: 0.3 10E9/L (ref 0–0.7)
EOSINOPHIL NFR BLD AUTO: 2.6 %
ERYTHROCYTE [DISTWIDTH] IN BLOOD BY AUTOMATED COUNT: 14.6 % (ref 10–15)
GFR SERPL CREATININE-BSD FRML MDRD: >90 ML/MIN/{1.73_M2}
GFR SERPL CREATININE-BSD FRML MDRD: >90 ML/MIN/{1.73_M2}
GLUCOSE SERPL-MCNC: 118 MG/DL (ref 70–99)
GLUCOSE UR STRIP-MCNC: NEGATIVE MG/DL
HCG SERPL QL: NEGATIVE
HCT VFR BLD AUTO: 40 % (ref 35–47)
HGB BLD-MCNC: 12.9 G/DL (ref 11.7–15.7)
HGB UR QL STRIP: NEGATIVE
IMM GRANULOCYTES # BLD: 0 10E9/L (ref 0–0.4)
IMM GRANULOCYTES NFR BLD: 0.4 %
INTERPRETATION ECG - MUSE: NORMAL
KETONES UR STRIP-MCNC: NEGATIVE MG/DL
LACTATE BLD-SCNC: 1 MMOL/L (ref 0.7–2.1)
LEUKOCYTE ESTERASE UR QL STRIP: NEGATIVE
LIPASE SERPL-CCNC: 146 U/L (ref 73–393)
LYMPHOCYTES # BLD AUTO: 1.4 10E9/L (ref 0.8–5.3)
LYMPHOCYTES NFR BLD AUTO: 14.3 %
MCH RBC QN AUTO: 28.4 PG (ref 26.5–33)
MCHC RBC AUTO-ENTMCNC: 32.3 G/DL (ref 31.5–36.5)
MCV RBC AUTO: 88 FL (ref 78–100)
MONOCYTES # BLD AUTO: 0.6 10E9/L (ref 0–1.3)
MONOCYTES NFR BLD AUTO: 6 %
MUCOUS THREADS #/AREA URNS LPF: PRESENT /LPF
NEUTROPHILS # BLD AUTO: 7.6 10E9/L (ref 1.6–8.3)
NEUTROPHILS NFR BLD AUTO: 76.4 %
NITRATE UR QL: NEGATIVE
NRBC # BLD AUTO: 0 10*3/UL
NRBC BLD AUTO-RTO: 0 /100
PCO2 BLDV: 36 MM HG (ref 40–50)
PH BLDV: 7.42 PH (ref 7.32–7.43)
PH UR STRIP: 7 PH (ref 5–7)
PLATELET # BLD AUTO: 275 10E9/L (ref 150–450)
PO2 BLDV: 37 MM HG (ref 25–47)
POTASSIUM SERPL-SCNC: 3.5 MMOL/L (ref 3.4–5.3)
PROT SERPL-MCNC: 7.6 G/DL (ref 6.8–8.8)
RBC # BLD AUTO: 4.55 10E12/L (ref 3.8–5.2)
RBC #/AREA URNS AUTO: 1 /HPF (ref 0–2)
SAO2 % BLDV FROM PO2: 71 %
SODIUM SERPL-SCNC: 138 MMOL/L (ref 133–144)
SOURCE: ABNORMAL
SP GR UR STRIP: >1.035 (ref 1–1.03)
SQUAMOUS #/AREA URNS AUTO: 3 /HPF (ref 0–1)
TROPONIN I SERPL-MCNC: <0.015 UG/L (ref 0–0.04)
UROBILINOGEN UR STRIP-MCNC: NORMAL MG/DL (ref 0–2)
WBC # BLD AUTO: 9.9 10E9/L (ref 4–11)
WBC #/AREA URNS AUTO: <1 /HPF (ref 0–5)

## 2019-10-15 PROCEDURE — 84703 CHORIONIC GONADOTROPIN ASSAY: CPT | Performed by: EMERGENCY MEDICINE

## 2019-10-15 PROCEDURE — 25000132 ZZH RX MED GY IP 250 OP 250 PS 637: Performed by: FAMILY MEDICINE

## 2019-10-15 PROCEDURE — 81001 URINALYSIS AUTO W/SCOPE: CPT | Performed by: EMERGENCY MEDICINE

## 2019-10-15 PROCEDURE — 25000128 H RX IP 250 OP 636: Performed by: FAMILY MEDICINE

## 2019-10-15 PROCEDURE — 84484 ASSAY OF TROPONIN QUANT: CPT | Performed by: EMERGENCY MEDICINE

## 2019-10-15 PROCEDURE — 12000001 ZZH R&B MED SURG/OB UMMC

## 2019-10-15 PROCEDURE — 85025 COMPLETE CBC W/AUTO DIFF WBC: CPT | Performed by: EMERGENCY MEDICINE

## 2019-10-15 PROCEDURE — 71275 CT ANGIOGRAPHY CHEST: CPT

## 2019-10-15 PROCEDURE — 82565 ASSAY OF CREATININE: CPT

## 2019-10-15 PROCEDURE — 25800030 ZZH RX IP 258 OP 636: Performed by: FAMILY MEDICINE

## 2019-10-15 PROCEDURE — 82803 BLOOD GASES ANY COMBINATION: CPT

## 2019-10-15 PROCEDURE — 25000128 H RX IP 250 OP 636: Performed by: STUDENT IN AN ORGANIZED HEALTH CARE EDUCATION/TRAINING PROGRAM

## 2019-10-15 PROCEDURE — 80053 COMPREHEN METABOLIC PANEL: CPT | Performed by: EMERGENCY MEDICINE

## 2019-10-15 PROCEDURE — 83605 ASSAY OF LACTIC ACID: CPT

## 2019-10-15 PROCEDURE — 83690 ASSAY OF LIPASE: CPT | Performed by: EMERGENCY MEDICINE

## 2019-10-15 PROCEDURE — 25000132 ZZH RX MED GY IP 250 OP 250 PS 637: Performed by: EMERGENCY MEDICINE

## 2019-10-15 PROCEDURE — 25000128 H RX IP 250 OP 636: Performed by: EMERGENCY MEDICINE

## 2019-10-15 PROCEDURE — 74177 CT ABD & PELVIS W/CONTRAST: CPT

## 2019-10-15 RX ORDER — CLINDAMYCIN PALMITATE HYDROCHLORIDE 75 MG/5ML
300 SOLUTION ORAL ONCE
Status: COMPLETED | OUTPATIENT
Start: 2019-10-15 | End: 2019-10-15

## 2019-10-15 RX ORDER — LEVOFLOXACIN 25 MG/ML
500 SOLUTION ORAL DAILY
Status: DISCONTINUED | OUTPATIENT
Start: 2019-10-16 | End: 2019-10-17 | Stop reason: HOSPADM

## 2019-10-15 RX ORDER — IBUPROFEN 100 MG/5ML
400 SUSPENSION, ORAL (FINAL DOSE FORM) ORAL EVERY 6 HOURS PRN
Status: DISCONTINUED | OUTPATIENT
Start: 2019-10-15 | End: 2019-10-17 | Stop reason: HOSPADM

## 2019-10-15 RX ORDER — DESVENLAFAXINE 50 MG/1
100 TABLET, FILM COATED, EXTENDED RELEASE ORAL DAILY
Status: DISCONTINUED | OUTPATIENT
Start: 2019-10-16 | End: 2019-10-17 | Stop reason: HOSPADM

## 2019-10-15 RX ORDER — AMOXICILLIN 250 MG
2 CAPSULE ORAL 2 TIMES DAILY
Status: DISCONTINUED | OUTPATIENT
Start: 2019-10-15 | End: 2019-10-15

## 2019-10-15 RX ORDER — CLONIDINE HYDROCHLORIDE 0.1 MG/1
0.1 TABLET ORAL 2 TIMES DAILY
Status: DISCONTINUED | OUTPATIENT
Start: 2019-10-15 | End: 2019-10-17 | Stop reason: HOSPADM

## 2019-10-15 RX ORDER — ONDANSETRON 2 MG/ML
4 INJECTION INTRAMUSCULAR; INTRAVENOUS ONCE
Status: COMPLETED | OUTPATIENT
Start: 2019-10-15 | End: 2019-10-15

## 2019-10-15 RX ORDER — IOPAMIDOL 755 MG/ML
135 INJECTION, SOLUTION INTRAVASCULAR ONCE
Status: COMPLETED | OUTPATIENT
Start: 2019-10-15 | End: 2019-10-15

## 2019-10-15 RX ORDER — BISACODYL 5 MG
15 TABLET, DELAYED RELEASE (ENTERIC COATED) ORAL DAILY PRN
Status: DISCONTINUED | OUTPATIENT
Start: 2019-10-15 | End: 2019-10-17 | Stop reason: HOSPADM

## 2019-10-15 RX ORDER — POTASSIUM CHLORIDE 750 MG/1
20-40 TABLET, EXTENDED RELEASE ORAL
Status: DISCONTINUED | OUTPATIENT
Start: 2019-10-15 | End: 2019-10-17

## 2019-10-15 RX ORDER — METOCLOPRAMIDE 10 MG/1
10 TABLET ORAL EVERY 6 HOURS PRN
Status: DISCONTINUED | OUTPATIENT
Start: 2019-10-15 | End: 2019-10-17 | Stop reason: HOSPADM

## 2019-10-15 RX ORDER — AMOXICILLIN 250 MG
1 CAPSULE ORAL 2 TIMES DAILY
Status: DISCONTINUED | OUTPATIENT
Start: 2019-10-16 | End: 2019-10-17 | Stop reason: HOSPADM

## 2019-10-15 RX ORDER — FLUCONAZOLE 40 MG/ML
200 POWDER, FOR SUSPENSION ORAL DAILY
Status: DISCONTINUED | OUTPATIENT
Start: 2019-10-16 | End: 2019-10-17 | Stop reason: HOSPADM

## 2019-10-15 RX ORDER — ONDANSETRON 4 MG/1
4 TABLET, ORALLY DISINTEGRATING ORAL EVERY 6 HOURS PRN
Status: DISCONTINUED | OUTPATIENT
Start: 2019-10-15 | End: 2019-10-17 | Stop reason: HOSPADM

## 2019-10-15 RX ORDER — NALOXONE HYDROCHLORIDE 0.4 MG/ML
.1-.4 INJECTION, SOLUTION INTRAMUSCULAR; INTRAVENOUS; SUBCUTANEOUS
Status: DISCONTINUED | OUTPATIENT
Start: 2019-10-15 | End: 2019-10-17 | Stop reason: HOSPADM

## 2019-10-15 RX ORDER — ONDANSETRON 4 MG/1
8 TABLET, ORALLY DISINTEGRATING ORAL EVERY 6 HOURS PRN
Status: DISCONTINUED | OUTPATIENT
Start: 2019-10-15 | End: 2019-10-15

## 2019-10-15 RX ORDER — METOCLOPRAMIDE HYDROCHLORIDE 5 MG/ML
10 INJECTION INTRAMUSCULAR; INTRAVENOUS EVERY 6 HOURS PRN
Status: DISCONTINUED | OUTPATIENT
Start: 2019-10-15 | End: 2019-10-17 | Stop reason: HOSPADM

## 2019-10-15 RX ORDER — OXYCODONE HCL 5 MG/5 ML
5 SOLUTION, ORAL ORAL ONCE
Status: COMPLETED | OUTPATIENT
Start: 2019-10-15 | End: 2019-10-15

## 2019-10-15 RX ORDER — DIPHENHYDRAMINE HCL 25 MG
25 CAPSULE ORAL EVERY 6 HOURS PRN
Status: DISCONTINUED | OUTPATIENT
Start: 2019-10-15 | End: 2019-10-17 | Stop reason: HOSPADM

## 2019-10-15 RX ORDER — POTASSIUM CHLORIDE 7.45 MG/ML
10 INJECTION INTRAVENOUS
Status: DISCONTINUED | OUTPATIENT
Start: 2019-10-15 | End: 2019-10-17

## 2019-10-15 RX ORDER — PROCHLORPERAZINE 25 MG
25 SUPPOSITORY, RECTAL RECTAL EVERY 12 HOURS PRN
Status: DISCONTINUED | OUTPATIENT
Start: 2019-10-15 | End: 2019-10-17 | Stop reason: HOSPADM

## 2019-10-15 RX ORDER — BISACODYL 10 MG
10 SUPPOSITORY, RECTAL RECTAL DAILY PRN
Status: DISCONTINUED | OUTPATIENT
Start: 2019-10-15 | End: 2019-10-15

## 2019-10-15 RX ORDER — AMOXICILLIN 250 MG
2 CAPSULE ORAL 2 TIMES DAILY
Status: DISCONTINUED | OUTPATIENT
Start: 2019-10-16 | End: 2019-10-17 | Stop reason: HOSPADM

## 2019-10-15 RX ORDER — POTASSIUM CL/LIDO/0.9 % NACL 10MEQ/0.1L
10 INTRAVENOUS SOLUTION, PIGGYBACK (ML) INTRAVENOUS
Status: DISCONTINUED | OUTPATIENT
Start: 2019-10-15 | End: 2019-10-17

## 2019-10-15 RX ORDER — METFORMIN HCL 500 MG
500 TABLET, EXTENDED RELEASE 24 HR ORAL
Status: DISCONTINUED | OUTPATIENT
Start: 2019-10-16 | End: 2019-10-17 | Stop reason: HOSPADM

## 2019-10-15 RX ORDER — PROCHLORPERAZINE MALEATE 5 MG
10 TABLET ORAL EVERY 6 HOURS PRN
Status: DISCONTINUED | OUTPATIENT
Start: 2019-10-15 | End: 2019-10-17 | Stop reason: HOSPADM

## 2019-10-15 RX ORDER — KETOROLAC TROMETHAMINE 15 MG/ML
15 INJECTION, SOLUTION INTRAMUSCULAR; INTRAVENOUS ONCE
Status: COMPLETED | OUTPATIENT
Start: 2019-10-15 | End: 2019-10-15

## 2019-10-15 RX ORDER — CLINDAMYCIN HCL 300 MG
300 CAPSULE ORAL ONCE
Status: DISCONTINUED | OUTPATIENT
Start: 2019-10-15 | End: 2019-10-15

## 2019-10-15 RX ORDER — OXYCODONE HCL 5 MG/5 ML
5 SOLUTION, ORAL ORAL EVERY 4 HOURS PRN
Status: DISCONTINUED | OUTPATIENT
Start: 2019-10-15 | End: 2019-10-16

## 2019-10-15 RX ORDER — GABAPENTIN 300 MG/1
600 CAPSULE ORAL 3 TIMES DAILY
Status: DISCONTINUED | OUTPATIENT
Start: 2019-10-15 | End: 2019-10-17 | Stop reason: HOSPADM

## 2019-10-15 RX ORDER — DEXTROSE MONOHYDRATE, SODIUM CHLORIDE, AND POTASSIUM CHLORIDE 50; 1.49; 4.5 G/1000ML; G/1000ML; G/1000ML
INJECTION, SOLUTION INTRAVENOUS CONTINUOUS
Status: DISCONTINUED | OUTPATIENT
Start: 2019-10-15 | End: 2019-10-16

## 2019-10-15 RX ORDER — CLINDAMYCIN PALMITATE HYDROCHLORIDE 75 MG/5ML
300 SOLUTION ORAL EVERY 6 HOURS
Status: DISCONTINUED | OUTPATIENT
Start: 2019-10-15 | End: 2019-10-17 | Stop reason: HOSPADM

## 2019-10-15 RX ORDER — SODIUM CHLORIDE 9 MG/ML
1000 INJECTION, SOLUTION INTRAVENOUS CONTINUOUS
Status: DISCONTINUED | OUTPATIENT
Start: 2019-10-15 | End: 2019-10-17 | Stop reason: HOSPADM

## 2019-10-15 RX ORDER — ACETAMINOPHEN 500 MG
1000 TABLET ORAL ONCE
Status: COMPLETED | OUTPATIENT
Start: 2019-10-15 | End: 2019-10-15

## 2019-10-15 RX ORDER — POTASSIUM CHLORIDE 29.8 MG/ML
20 INJECTION INTRAVENOUS
Status: DISCONTINUED | OUTPATIENT
Start: 2019-10-15 | End: 2019-10-17

## 2019-10-15 RX ORDER — TOPIRAMATE 50 MG/1
100 TABLET, FILM COATED ORAL AT BEDTIME
Status: DISCONTINUED | OUTPATIENT
Start: 2019-10-15 | End: 2019-10-17 | Stop reason: HOSPADM

## 2019-10-15 RX ORDER — LIDOCAINE 40 MG/G
CREAM TOPICAL
Status: DISCONTINUED | OUTPATIENT
Start: 2019-10-15 | End: 2019-10-17 | Stop reason: HOSPADM

## 2019-10-15 RX ORDER — BUSPIRONE HYDROCHLORIDE 5 MG/1
10 TABLET ORAL 2 TIMES DAILY
Status: DISCONTINUED | OUTPATIENT
Start: 2019-10-15 | End: 2019-10-17 | Stop reason: HOSPADM

## 2019-10-15 RX ORDER — ACETAMINOPHEN 325 MG/1
650 TABLET ORAL EVERY 4 HOURS PRN
Status: DISCONTINUED | OUTPATIENT
Start: 2019-10-15 | End: 2019-10-17 | Stop reason: HOSPADM

## 2019-10-15 RX ORDER — ALBUTEROL SULFATE 90 UG/1
2 AEROSOL, METERED RESPIRATORY (INHALATION) EVERY 4 HOURS PRN
Status: DISCONTINUED | OUTPATIENT
Start: 2019-10-15 | End: 2019-10-17 | Stop reason: HOSPADM

## 2019-10-15 RX ORDER — BISACODYL 5 MG
5 TABLET, DELAYED RELEASE (ENTERIC COATED) ORAL DAILY PRN
Status: DISCONTINUED | OUTPATIENT
Start: 2019-10-15 | End: 2019-10-17 | Stop reason: HOSPADM

## 2019-10-15 RX ORDER — MAGNESIUM SULFATE HEPTAHYDRATE 40 MG/ML
4 INJECTION, SOLUTION INTRAVENOUS EVERY 4 HOURS PRN
Status: DISCONTINUED | OUTPATIENT
Start: 2019-10-15 | End: 2019-10-17 | Stop reason: HOSPADM

## 2019-10-15 RX ORDER — POTASSIUM CHLORIDE 1.5 G/1.58G
20-40 POWDER, FOR SOLUTION ORAL
Status: DISCONTINUED | OUTPATIENT
Start: 2019-10-15 | End: 2019-10-17

## 2019-10-15 RX ORDER — ONDANSETRON 2 MG/ML
4 INJECTION INTRAMUSCULAR; INTRAVENOUS EVERY 6 HOURS PRN
Status: DISCONTINUED | OUTPATIENT
Start: 2019-10-15 | End: 2019-10-17 | Stop reason: HOSPADM

## 2019-10-15 RX ORDER — AMOXICILLIN 250 MG
1 CAPSULE ORAL 2 TIMES DAILY
Status: DISCONTINUED | OUTPATIENT
Start: 2019-10-15 | End: 2019-10-15

## 2019-10-15 RX ORDER — BISACODYL 5 MG
10 TABLET, DELAYED RELEASE (ENTERIC COATED) ORAL DAILY PRN
Status: DISCONTINUED | OUTPATIENT
Start: 2019-10-15 | End: 2019-10-17 | Stop reason: HOSPADM

## 2019-10-15 RX ADMIN — BUSPIRONE HYDROCHLORIDE 10 MG: 5 TABLET ORAL at 21:58

## 2019-10-15 RX ADMIN — CLINDAMYCIN PALMITATE HYDROCHLORIDE 300 MG: 75 SOLUTION ORAL at 23:26

## 2019-10-15 RX ADMIN — IOPAMIDOL 135 ML: 755 INJECTION, SOLUTION INTRAVENOUS at 15:24

## 2019-10-15 RX ADMIN — SODIUM CHLORIDE 1000 ML: 9 INJECTION, SOLUTION INTRAVENOUS at 16:36

## 2019-10-15 RX ADMIN — CLONIDINE HYDROCHLORIDE 0.1 MG: 0.1 TABLET ORAL at 21:58

## 2019-10-15 RX ADMIN — ONDANSETRON 4 MG: 2 INJECTION INTRAMUSCULAR; INTRAVENOUS at 16:33

## 2019-10-15 RX ADMIN — POTASSIUM CHLORIDE, DEXTROSE MONOHYDRATE AND SODIUM CHLORIDE: 150; 5; 450 INJECTION, SOLUTION INTRAVENOUS at 22:14

## 2019-10-15 RX ADMIN — OXYCODONE HYDROCHLORIDE 5 MG: 5 SOLUTION ORAL at 16:17

## 2019-10-15 RX ADMIN — KETOROLAC TROMETHAMINE 15 MG: 15 INJECTION, SOLUTION INTRAMUSCULAR; INTRAVENOUS at 20:05

## 2019-10-15 RX ADMIN — ACETAMINOPHEN 1000 MG: 500 TABLET, FILM COATED ORAL at 12:28

## 2019-10-15 RX ADMIN — GABAPENTIN 600 MG: 300 CAPSULE ORAL at 21:58

## 2019-10-15 RX ADMIN — ONDANSETRON 4 MG: 2 INJECTION INTRAMUSCULAR; INTRAVENOUS at 12:44

## 2019-10-15 RX ADMIN — TOPIRAMATE 100 MG: 50 TABLET ORAL at 21:58

## 2019-10-15 RX ADMIN — SENNOSIDES AND DOCUSATE SODIUM 1 TABLET: 8.6; 5 TABLET ORAL at 21:58

## 2019-10-15 RX ADMIN — LURASIDONE HYDROCHLORIDE 60 MG: 40 TABLET, FILM COATED ORAL at 22:02

## 2019-10-15 RX ADMIN — PROCHLORPERAZINE EDISYLATE 10 MG: 5 INJECTION, SOLUTION INTRAMUSCULAR; INTRAVENOUS at 22:05

## 2019-10-15 RX ADMIN — CLINDAMYCIN PALMITATE HYDROCHLORIDE 300 MG: 75 SOLUTION ORAL at 16:54

## 2019-10-15 RX ADMIN — SODIUM CHLORIDE 1000 ML: 9 INJECTION, SOLUTION INTRAVENOUS at 12:44

## 2019-10-15 ASSESSMENT — ACTIVITIES OF DAILY LIVING (ADL)
SWALLOWING: 2-->DIFFICULTY SWALLOWING LIQUIDS/FOODS
WHICH_OF_THE_ABOVE_FUNCTIONAL_RISKS_HAD_A_RECENT_ONSET_OR_CHANGE?: SWALLOWING

## 2019-10-15 ASSESSMENT — MIFFLIN-ST. JEOR: SCORE: 1865.33

## 2019-10-15 NOTE — ED NOTES
[facility], [place]   ED Nurse to Floor Handoff     Nevin Alvarado is a 28 year old female who speaks English and lives unknown,  in a home  They arrived in the ED by ambulance from clinic    ED Chief Complaint: Dehydration and Post-op Problem    ED Dx;   Final diagnoses:   None         Needed?: No    Allergies:   Allergies   Allergen Reactions     Amoxicillin-Pot Clavulanate Other (See Comments), Rash and Swelling     PN: facial swelling, left side. Also had cortisone injection the same day as she started the Augmentin.  PN: facial swelling, left side. Also had cortisone injection the same day as she started the Augmentin.  Noted in downtime recovery of chart.       Penicillins Anaphylaxis     Blood-Group Specific Substance Other (See Comments)     Patient has an anti-Cw and non-specific antibodies. Blood product orders may be delayed. Draw one red top and two purple top tubes for all type/screen/crossmatch orders.     Hydrocodone Nausea and Vomiting     vomiting for days     Influenza Vaccines Other (See Comments)     Oseltamivir Hives     med stopped, PN: med stopped     Vancomycin Swelling     Vicodin [Hydrocodone-Acetaminophen] Nausea and Vomiting     Cephalosporins Rash     Lamotrigine Rash     Possibly associated with Lamictal.      Latex Rash   .  Past Medical Hx:   Past Medical History:   Diagnosis Date     ADD (attention deficit disorder)      Anorexia nervosa with bulimia     history of; on Topamax     Anxiety      Borderline personality disorder (H)      Depression      Depressive disorder      H/O self-harm      Lives in independent group home     due to debilitating mental illness     Migraine without aura     no known triggers; on Topamax bid and Imitrex PRN     Morbid obesity (H)      PTSD (post-traumatic stress disorder)      Rectal foreign body - Recurrent issue, self placed      Swallowed foreign body - Recurrent issue, self placed       Baseline  Mental status: WDL  Current Mental Status changes: at basesline    Infection present or suspected this encounter: no  Sepsis suspected: No  Isolation type: No active isolations     Activity level - Baseline/Home:  Independent  Activity Level - Current:   Independent    Bariatric equipment needed?: No    In the ED these meds were given:   Medications   0.9% sodium chloride BOLUS (0 mLs Intravenous Stopped 10/15/19 1427)     Followed by   sodium chloride 0.9% infusion (has no administration in time range)   ondansetron (ZOFRAN) injection 4 mg (4 mg Intravenous Given 10/15/19 1244)   acetaminophen (TYLENOL) tablet 1,000 mg (1,000 mg Oral Given 10/15/19 1228)   iopamidol (ISOVUE-370) solution 135 mL (135 mLs Intravenous Given 10/15/19 1524)   sodium chloride (PF) 0.9% PF flush 90 mL (90 mLs Intravenous Given 10/15/19 1524)   oxyCODONE (ROXICODONE) solution 5 mg (5 mg Oral Given 10/15/19 1617)   0.9% sodium chloride BOLUS (1,000 mLs Intravenous New Bag 10/15/19 1636)   clindamycin (CLEOCIN) solution 300 mg (300 mg Oral Given 10/15/19 1654)   ondansetron (ZOFRAN) injection 4 mg (4 mg Intravenous Given 10/15/19 1633)       Drips running?  Yes NS    Home pump  Yes Port - o - cath.     Current LDAs  Port A Cath Single 10/07/19 Right Chest wall (Active)   Access Date 10/15/19 10/15/2019  1:20 PM   Access Attempts 1 10/15/2019  1:20 PM   Gauge/Length Power noncoring 90 degree bend 10/15/2019  1:20 PM   Site Assessment WDL 10/15/2019  1:20 PM   Line Status Blood return noted;Infusing 10/15/2019  1:20 PM   Dressing Intervention Transparent 10/15/2019  1:20 PM   Number of days: 8       Incision/Surgical Site 10/07/19 Lower;Midline Abdomen (Active)   Number of days: 8       Labs results:   Labs Ordered and Resulted from Time of ED Arrival Up to the Time of Departure from the ED   COMPREHENSIVE METABOLIC PANEL - Abnormal; Notable for the following components:       Result Value    Glucose 118 (*)     Albumin 3.1 (*)     All other  components within normal limits   CREATININE POCT - Abnormal; Notable for the following components:    Creatinine 0.5 (*)     All other components within normal limits   ISTAT  GASES LACTATE JENNIFFER POCT - Abnormal; Notable for the following components:    PCO2 Venous 36 (*)     All other components within normal limits   CBC WITH PLATELETS DIFFERENTIAL   LIPASE   ROUTINE UA WITH MICROSCOPIC   HCG QUALITATIVE   TROPONIN I   ISTAT CREATININE NURSING POCT   ISTAT CG4 GASES LACTATE JENNIFFER NURSING POCT       Imaging Studies:   Recent Results (from the past 24 hour(s))   CT Chest Pulmonary Embolism w Contrast    Narrative    Exam: Chest CT Pulmonary Angiogram 10/15/2019.    History: Worsening left-sided chest pain and back pain after  esophageal perforation. Evaluate for pathology including PA.    Comparison: CT chest 10/12/2019.    Technique: Volumetric helical acquisition of the chest was obtained  following pulmonary embolism protocol.     Findings:    There is adequate opacification of the pulmonary arteries. No filling  defect to suggest pulmonary artery embolism. The aorta is normal in  caliber without evidence of dissection.     Redemonstration of esophageal stent with hyperdense material within  the mid and distal esophagus (series 5 image 24), which is presumably  part of the esophageal stent. Again noted is some mucus debris within  the esophagus. Right-sided Port-A-Cath with tip located in the high  right atrium. The heart is not enlarged. No pericardial effusion. No  mediastinal, hilar, or axillary lymphadenopathy. Redemonstration of  several foci of air inferior to the left main bronchus (series 5 image  29) is present    Mild left basilar atelectasis. Several sub-4 mm pulmonary nodules for  example series 6 image 38 in the right lower lobe. Trace left pleural  effusion. No pneumothorax.     Limited evaluation of the abdomen, no appreciable acute abdominal  findings.Please refer to CT abdomen and pelvis same day  for detailed  report.    No suspicious bony lesions. Mild degenerative changes of the thoracic  spine.      Impression    Impression:     1. No evidence for pulmonary embolism or aortic pathology.  2. Similar appearance of the esophageal stent compared with CT  10/12/2019. There is similar foci of air under the left main bronchus.  No significant increase in the amount of air to suggest perforation.  3. Trace left pleural effusion.     I have personally reviewed the examination and initial interpretation  and I agree with the findings.    RICHELLE JACKSON MD   CT Abdomen Pelvis w Contrast    Narrative    EXAMINATION: CT ABDOMEN PELVIS W CONTRAST, 10/15/2019 3:39 PM    TECHNIQUE:  Helical CT images from the lung bases through the  symphysis pubis were obtained with contrast.  Coronal and sagittal  reformatted images were generated at a workstation for further  assessment.    CONTRAST:  135 ml Isovue 370.    COMPARISON: None.    HISTORY: abdominal pain, hx of foreign body ingestions, lack of bowel  movement and flatus, eval for obstruction, etc    FINDINGS:    Lines and tubes: None.    Lung bases: No consolidation or pleural effusion. Mild subsegmental  bibasilar atelectasis. Partial visualization of esophageal stent.  There is some esophageal wall thickening along the towards the  gastroesophageal junction. Please see separately dictated CT chest  same date.    Abdomen and pelvis:    Rim-enhancing fluid collection in the subcutaneous midline lower  anterior abdominal wall measuring 2.3 x 3.1 cm (series 2, image 53),  no intra-abdominal extension. This is a contain any internal foci of  air. Fatty streakiness in the subcutaneous anterior abdominal wall    Liver: No suspicious liver lesions. Portal veins appear patent.   Gallbladder: No gallstones. No evidence of acute cholecystitis.  Spleen: Normal size.  Pancreas: No suspicious pancreatic lesions. The pancreatic duct is not  dilated.  Adrenal glands: No adrenal  "nodules.   Kidneys: No hydronephrosis or obstructing renal stones.    Bladder / Pelvic organs: Unremarkable. Intrauterine contraceptive  device in the uterus  Bowel: No bowel wall thickening. The appendix is unremarkable. No  abnormal small or large bowel loop dilatation  Lymph nodes: No retroperitoneal, mesenteric, or pelvic  lymphadenopathy.  Peritoneum / Retroperitoneum: No free fluid or air within the abdomen.  Vessels: No infrarenal aortic aneurysm.     Bones and soft tissues: Degenerative changes of the spine. No  aggressive osseous lesion      Impression    IMPRESSION:   1. No evidence for  bowel obstruction.  2. Small collection in the subcutaneous midline lower anterior  abdominal wall measuring up to 3.1 cm, associated with fatty  streakiness. No intra-abdominal extension.  3. No unexpected radiopaque foreign body in the abdomen or pelvis  identified.  4. Stent in the esophagus partially visualized with some wall  thickening. Please see separate CT chest same date.    I have personally reviewed the examination and initial interpretation  and I agree with the findings.    ROLANDA MCCLAIN MD       Recent vital signs:   /79   Pulse 79   Temp 98  F (36.7  C) (Oral)   Resp 18   Ht 1.575 m (5' 2\")   LMP 10/15/2017   SpO2 99%   BMI 49.16 kg/m      [place] Coma Scale Score: 15 (10/15/19 1323)       Cardiac Rhythm: Normal Sinus  Pt needs tele? No  Skin/wound Issues: None    Code Status: Full Code    Pain control: fair    Nausea control: fair    Abnormal labs/tests/findings requiring intervention:      Family present during ED course? No   Family Comments/Social Situation comments:      Tasks needing completion: None    Teri Kaye, RN  [phone] [facility]      "

## 2019-10-15 NOTE — CONSULTS
GENERAL SURGERY CONSULT  Nevin Alvarado MRN# [id number]   YOB: 1990 Age: 28 year old     Date of Admission: 10/15/19    REASON FOR CONSULTATION: Abdominal Pain    HPI: Nevin Alvarado is a 28 year old year old woman with a complex psychosocial history, including foreign body ingestion and rectal insertion requiring procedural removal (exploratory laparotomy on 09/11 for removal of foreign body from rectum / colon), who presents to the ED due to worsening abdominal pain after a recent EGD with foreign body removal and esophogeal stent (10/9). She returned to the ED on 10/12 with complaint of dyspnea and odynophagia, and CT revealed pneumomediastinum from her previous injury.     She presents today for worsening abdominal pain, which has occurred since leaving the hospital (10/9). She describes this pain as starting in her left back and radiating towards her abdomen. She has been taking oxycodone and ibuprofen, with her last dose this morning at 8:30 AM. She denies any recent foreign body ingestion. Her last known bowel movement or flatus was 10/11. She endorses nausea without vomiting after oral fluid intake, and states she last ate on 10/05 prior to her hospital admission, however she has been drinking a few Boost since Friday 10/11. She has not taken stool softeners since her surgery for fear of explosive diarrhea. Nevin also endorses subjective fevers, chills, and night sweats. She also states she has not urinated in 2 days, though was able to provide a urine sample tonight in the ED. She has taken her post-operative antibiotics, and received one dose of clindamycin in the ED on 10/12.     Surgery was consulted to evaluate a seroma discovered on CT obtained for history of decreased oral intake and concern for foreign body ingestion.     REVIEW OF SYSTEMS: The remainder of the complete ROS was negative unless noted in the HPI. Denies visual changes, headache, sore throat, rhinorrhea,  chest pain, sob, diarrhea,and recent weight loss.     PAST MEDICAL HISTORY:   Past Medical History:   Diagnosis Date     ADD (attention deficit disorder)      Anorexia nervosa with bulimia     history of; on Topamax     Anxiety      Borderline personality disorder (H)      Depression      Depressive disorder      H/O self-harm      Lives in independent group home     due to debilitating mental illness     Migraine without aura     no known triggers; on Topamax bid and Imitrex PRN     Morbid obesity (H)      PTSD (post-traumatic stress disorder)      Rectal foreign body - Recurrent issue, self placed      Swallowed foreign body - Recurrent issue, self placed        PAST SURGICAL HISTORY:   Past Surgical History:   Procedure Laterality Date     COMBINED ESOPHAGOSCOPY, GASTROSCOPY, DUODENOSCOPY (EGD), REPLACE ESOPHAGEAL STENT N/A 10/9/2019    Procedure: Upper Endoscopy with Suture Placement;  Surgeon: Zurdo Ramirez MD;  Location: [facility]     ESOPHAGOSCOPY, GASTROSCOPY, DUODENOSCOPY (EGD), COMBINED N/A 3/9/2017    Procedure: COMBINED ESOPHAGOSCOPY, GASTROSCOPY, DUODENOSCOPY (EGD), REMOVE FOREIGN BODY;  Surgeon: Avis Guzmán MD;  Location: [facility]     ESOPHAGOSCOPY, GASTROSCOPY, DUODENOSCOPY (EGD), COMBINED N/A 4/20/2017    Procedure: COMBINED ESOPHAGOSCOPY, GASTROSCOPY, DUODENOSCOPY (EGD), REMOVE FOREIGN BODY;  EGD removal Foregin body;  Surgeon: Lokesh Paula MD;  Location: [facility]     ESOPHAGOSCOPY, GASTROSCOPY, DUODENOSCOPY (EGD), COMBINED N/A 6/12/2017    Procedure: COMBINED ESOPHAGOSCOPY, GASTROSCOPY, DUODENOSCOPY (EGD);  COMBINED ESOPHAGOSCOPY, GASTROSCOPY, DUODENOSCOPY (EGD) [[id number]]attempted removal of foreign body;  Surgeon: Pamela Perez MD;  Location: [facility]     ESOPHAGOSCOPY, GASTROSCOPY, DUODENOSCOPY (EGD), COMBINED N/A 6/9/2017    Procedure: COMBINED ESOPHAGOSCOPY, GASTROSCOPY, DUODENOSCOPY (EGD), REMOVE FOREIGN BODY;  Esophagoscopy, Gastroscopy, Duodenoscopy,  Removal of Foreign Body;  Surgeon: Dejon Marsh MD;  Location: [facility]     ESOPHAGOSCOPY, GASTROSCOPY, DUODENOSCOPY (EGD), COMBINED N/A 1/6/2018    Procedure: COMBINED ESOPHAGOSCOPY, GASTROSCOPY, DUODENOSCOPY (EGD), REMOVE FOREIGN BODY;  COMBINED ESOPHAGOSCOPY, GASTROSCOPY, DUODENOSCOPY (EGD) [by pascal net and snare with profol sedation;  Surgeon: Feliciano Emmanuel MD;  Location:  [facility]     ESOPHAGOSCOPY, GASTROSCOPY, DUODENOSCOPY (EGD), COMBINED N/A 3/19/2018    Procedure: COMBINED ESOPHAGOSCOPY, GASTROSCOPY, DUODENOSCOPY (EGD), REMOVE FOREIGN BODY;   Esophagodscopy, Gastroscopy, Duodenoscopy,Foreign Body Removal;  Surgeon: Brice Guzmán MD;  Location: [facility]     ESOPHAGOSCOPY, GASTROSCOPY, DUODENOSCOPY (EGD), COMBINED N/A 4/16/2018    Procedure: COMBINED ESOPHAGOSCOPY, GASTROSCOPY, DUODENOSCOPY (EGD), REMOVE FOREIGN BODY;  Esophagogastroduodenoscopy  Foreign Body Removal X 2;  Surgeon: Royer Moise MD;  Location: [facility]     ESOPHAGOSCOPY, GASTROSCOPY, DUODENOSCOPY (EGD), COMBINED N/A 6/1/2018    Procedure: COMBINED ESOPHAGOSCOPY, GASTROSCOPY, DUODENOSCOPY (EGD), REMOVE FOREIGN BODY;  COMBINED ESOPHAGOSCOPY, GASTROSCOPY, DUODENOSCOPY with removal of foreign body, propofol sedation by anesthesia;  Surgeon: Brice Martinez MD;  Location:  GI     ESOPHAGOSCOPY, GASTROSCOPY, DUODENOSCOPY (EGD), COMBINED N/A 7/25/2018    Procedure: COMBINED ESOPHAGOSCOPY, GASTROSCOPY, DUODENOSCOPY (EGD), REMOVE FOREIGN BODY;;  Surgeon: Candy Castelan MD;  Location:  GI     ESOPHAGOSCOPY, GASTROSCOPY, DUODENOSCOPY (EGD), COMBINED N/A 7/28/2018    Procedure: COMBINED ESOPHAGOSCOPY, GASTROSCOPY, DUODENOSCOPY (EGD), REMOVE FOREIGN BODY;  COMBINED ESOPHAGOSCOPY, GASTROSCOPY, DUODENOSCOPY (EGD), REMOVE FOREIGN BODY;  Surgeon: Brice Guzmán MD;  Location: [facility]     ESOPHAGOSCOPY, GASTROSCOPY, DUODENOSCOPY (EGD), COMBINED N/A 7/31/2018    Procedure: COMBINED ESOPHAGOSCOPY, GASTROSCOPY, DUODENOSCOPY  (EGD);  COMBINED ESOPHAGOSCOPY, GASTROSCOPY, DUODENOSCOPY (EGD) TO REMOVE FOREIGN BODY;  Surgeon: Lokesh Paula MD;  Location: [facility]     ESOPHAGOSCOPY, GASTROSCOPY, DUODENOSCOPY (EGD), COMBINED N/A 8/4/2018    Procedure: COMBINED ESOPHAGOSCOPY, GASTROSCOPY, DUODENOSCOPY (EGD), REMOVE FOREIGN BODY;   combined esophagoscopy, gastroscopy, duodenoscopy, REMOVE FOREIGN BODY ;  Surgeon: Lokesh Paula MD;  Location: [facility]     ESOPHAGOSCOPY, GASTROSCOPY, DUODENOSCOPY (EGD), COMBINED N/A 10/6/2019    Procedure: ESOPHAGOGASTRODUODENOSCOPY (EGD) with fireign body removal x2, esophageal stent placement with floroscopy;  Surgeon: Timoteo Espana MD;  Location: [facility]     EXAM UNDER ANESTHESIA ANUS N/A 1/10/2017    Procedure: EXAM UNDER ANESTHESIA ANUS;  Surgeon: Annmarie Haynes MD;  Location: [facility]     EXAM UNDER ANESTHESIA RECTUM N/A 7/19/2018    Procedure: EXAM UNDER ANESTHESIA RECTUM;  EXAM UNDER ANESTHESIA, REMOVAL OF RECTAL FOREIGN BODY;  Surgeon: Annmarie Haynes MD;  Location: [facility]     HC REMOVE FECAL IMPACTION OR FB W ANESTHESIA N/A 12/18/2016    Procedure: REMOVE FECAL IMPACTION/FOREIGN BODY UNDER ANESTHESIA;  Surgeon: Soham Cano MD;  Location: [facility]     KNEE SURGERY - removed a small tissue mass from knee Right      LAPAROSCOPIC ABLATION ENDOMETRIOSIS       LAPAROTOMY EXPLORATORY N/A 1/10/2017    Procedure: LAPAROTOMY EXPLORATORY;  Surgeon: Annmarie Haynes MD;  Location: [facility]     lymph nodes removed from neck; benign  age 7     MAMMOPLASTY REDUCTION Bilateral      RELEASE CARPAL TUNNEL Bilateral      SIGMOIDOSCOPY FLEXIBLE N/A 1/10/2017    Procedure: SIGMOIDOSCOPY FLEXIBLE;  Surgeon: Annmarie Haynes MD;  Location: [facility]     SIGMOIDOSCOPY FLEXIBLE N/A 5/8/2018    Procedure: SIGMOIDOSCOPY FLEXIBLE;  flex sig with foreign body removal using snare and rattooth forcep;  Surgeon: Soham Cano MD;  Location:  GI     SIGMOIDOSCOPY FLEXIBLE N/A 2/20/2019     Procedure: Exam under anesthesia Colonoscopy with attempted  removal of rectal foreign body;  Surgeon: Estrada Chávez MD;  Location: [facility]       ALLERGIES:    Allergies   Allergen Reactions     Amoxicillin-Pot Clavulanate Other (See Comments), Rash and Swelling     PN: facial swelling, left side. Also had cortisone injection the same day as she started the Augmentin.  PN: facial swelling, left side. Also had cortisone injection the same day as she started the Augmentin.  Noted in downtime recovery of chart.       Penicillins Anaphylaxis     Blood-Group Specific Substance Other (See Comments)     Patient has an anti-Cw and non-specific antibodies. Blood product orders may be delayed. Draw one red top and two purple top tubes for all type/screen/crossmatch orders.     Hydrocodone Nausea and Vomiting     vomiting for days     Influenza Vaccines Other (See Comments)     Oseltamivir Hives     med stopped, PN: med stopped     Vancomycin Swelling     Vicodin [Hydrocodone-Acetaminophen] Nausea and Vomiting     Cephalosporins Rash     Lamotrigine Rash     Possibly associated with Lamictal.      Latex Rash       HOME MEDICATIONS: No current facility-administered medications on file prior to encounter.   acetaminophen (TYLENOL) 500 MG tablet, Take 2 tablets (1,000 mg) by mouth every 6 hours as needed for pain or headache  albuterol (PROAIR HFA) 108 (90 Base) MCG/ACT inhaler, Inhale 2 puffs into the lungs 4 times daily as needed   busPIRone (BUSPAR) 10 MG tablet, Take 1 tablet (10 mg) by mouth twice daily  clindamycin (CLEOCIN) 75 MG/5ML solution, Take 20 mLs (300 mg) by mouth every 6 hours for 7 days  cloNIDine (CATAPRES) 0.1 MG tablet, Take 0.1 mg by mouth 2 times daily   desvenlafaxine (PRISTIQ) 100 MG 24 hr tablet, Take 1 tablet (100 mg) by mouth every morning  diphenhydrAMINE (BENADRYL) 25 MG capsule, Take 1-2 capsules (25-50 mg) by mouth every 6 hours as needed for itching or sleep  fluconazole (DIFLUCAN) 40 MG/ML  suspension, Take 5 mLs (200 mg) by mouth daily for 7 days  gabapentin (NEURONTIN) 300 MG capsule, Take 2 capsules (600 mg) by mouth three times daily  ibuprofen (ADVIL/MOTRIN) 100 MG/5ML suspension, Take 20 mLs (400 mg) by mouth every 6 hours as needed for moderate pain  levofloxacin (LEVAQUIN) 25 MG/ML solution, Take 20 mLs (500 mg) by mouth daily for 7 days  lurasidone (LATUDA) 60 MG TABS tablet, Take 1 tablet (60 mg) by mouth at bedtime  metFORMIN (GLUCOPHAGE-XR) 500 MG 24 hr tablet, Take 1 tablet (500 mg) by mouth daily  omeprazole (PRILOSEC) 2 mg/mL suspension, Take 10 mLs (20 mg) by mouth every morning (before breakfast)  ondansetron (ZOFRAN-ODT) 8 MG ODT tab, Take 1 tablet (8 mg) by mouth every 6 hours as needed for nausea or vomiting  oxyCODONE (ROXICODONE) 5 MG/5ML solution, Take 5 mLs (5 mg) by mouth every 4 hours as needed for severe pain  topiramate (TOPAMAX) 100 MG tablet, Take 1 tablet (100 mg) by mouth daily at bedtime  vitamin D3 (CHOLECALCIFEROL) 2000 units (50 mcg) tablet, Take 1 tablet (2,000 units) by mouth daily  docusate (COLACE) 50 MG/5ML liquid, Take 10 mLs (100 mg) by mouth 2 times daily for 15 days  docusate sodium (COLACE) 100 MG capsule, Take 1 capsule (100 mg) by mouth twice daily as needed for constipation  menthol (ICY HOT) 5 % PTCH, Apply 1 patch topically every 8 hours as needed for muscle soreness        SOCIAL HISTORY:   Social History     Tobacco Use     Smoking status: Never Smoker     Smokeless tobacco: Never Used   Substance Use Topics     Alcohol use: No     Alcohol/week: 0.0 standard drinks     Drug use: No       FAMILY HISTORY:   Family History   Problem Relation Age of Onset     Diabetes Type 2  Maternal Grandmother      Diabetes Type 2  Paternal Grandmother      Breast Cancer Paternal Grandmother      Cerebrovascular Disease Father 53     Myocardial Infarction No family hx of      Coronary Artery Disease Early Onset No family hx of      Ovarian Cancer No family hx of       "Colon Cancer No family hx of        PHYSICAL EXAMINATION:  /79   Pulse 79   Temp 98  F (36.7  C) (Oral)   Resp 18   Ht 1.575 m (5' 2\")   LMP 10/15/2017   SpO2 99%   BMI 49.16 kg/m       General: NAD, awake and alert, GCS 15  CV: Non-cyanotic, RRR, no MRG   Pulm: No increased work of breathing on room air, lungs CTAB  Abd: midline incision c/d/i, healing appropriately, soft, non-distended, non-tender to light and deep palpation  : not examined  Extremities: obese, WWP without edema  Neuro: No focal deficits noted, patient moves all extremities spontaneously    LABS:  Recent Labs   Lab 10/15/19  1242   WBC 9.9   RBC 4.55   HGB 12.9   HCT 40.0   MCV 88   MCH 28.4   MCHC 32.3   RDW 14.6          Recent Labs   Lab 10/15/19  1242      POTASSIUM 3.5   CHLORIDE 108   CO2 23   BUN 8   CR 0.56   *   KELBY 8.9       Recent Labs   Lab 10/15/19  1242   AST 14   ALT 20   ALKPHOS 63   BILITOTAL 0.3   ALBUMIN 3.1*       IMAGING:  CT (10/15)-   1. No evidence for bowel obstruction.  2. Small collection in the subcutaneous midline lower anterior  abdominal wall measuring up to 3.1 cm, associated with fatty  streakiness. No intra-abdominal extension.  3. No unexpected radiopaque foreign body in the abdomen or pelvis  identified.  4. Stent in the esophagus partially visualized with some wall  thickening. Please see separate CT chest same date.    A/P: Nevin Alvarado is a 28 year old female with complex psychosocial history, including foreign body ingestion and rectal insertion requiring procedural removal. She has a recent history of a midline laparotomy (09/11 to remove foreign body from colon / rectum) and an esophageal stent placement for esophageal perforation after ingesting a paperclip on 10/05. She was discharged from the hospital on 10/11 and has had difficulty maintaining her oral intake since then. She is found to have a seroma on CT obtained to evaluate complaints of general abdominal " pain.     - Admit to medicine for hydration  - Bowel regimen  - Ok for diet as tolerated    Discussed with chief resident and staff, Dr. Luana Foster MD  Surgery Resident PGY-2

## 2019-10-15 NOTE — ED PROVIDER NOTES
[place] EMERGENCY DEPARTMENT ([facility])  10/15/19 ED 21    History     Chief Complaint   Patient presents with     Dehydration     Post-op Problem      The history is provided by the patient and medical records.     Nevin Alvarado is a 28 year old female who presents with abdominal pain, nausea and vomiting.  She has a complex psychosocial history including self-injurious behavior with foreign body ingestions/rectal insertions requiring procedural interventions for extraction complicated by perforation, PTSD, major depressive disorder, BPD, and is on disability.  She lives in her own   She typically ingests paperclips, staples were safety pins for which she has undergone multiple EGDs and laparoscopies  She had recent admission for esophageal foreign body earlier this month.  She underwent EGD with foreign body removal and esophageal stent placement with fluoroscopy 10/9/2019 with [facility] GI service.  She was discharged back to home but has had problems with shortness of breath and painful swallowing and so presented to the emergency department on 10/12/2019.  She had a CT chest showing tiny pneumomediastinum without evidence of infection, this was felt to be from her initial injury and no acute process was noted on CT scan.  She was advised to continue her outpatient antibiotics and return for any worsening symptoms, was given a dose of clindamycin in the ED prior to discharge.  Patient states that since then her epigastric abdominal pain has been worsening and states that she hasn't been able to eat or drink due to abdominal pain and nausea since she left the hospital.  She denies any vomiting but states that oral intake makes her very very nauseated.  She has back pain as well that wraps around torso towards abdomen.  No sore throat.  She denies any recent foreign body by mouth or rectum.  She has been taking oxycodone, ibuprofen, and antibiotics without relief of symptoms.  Last  "dose of pain medications was at 8:30 AM today. She notes that she hasn't had a bowel movement or passed any flatus since last Friday (10/11/2019). She states that at time of discharge she was prescribed stool softeners but didn't take this because she developed watery stools after her procedure.  She did take one dose of imodium which seems to have stopped any stools whatsoever.  She states that she is scared to take stool softeners in case it gave her explosive diarrhea.  Patient states that she last ate on 10/5/2019.  She has been trying to stay hydrated but feels nauseated.  She states that she called Thoracic Surgery and they told her to go to the Emergency Department for evaluation.  Patient states at at home she felt subjectively feverish, has been waking up shivering with cold sweats.  She notes that she woke up this morning feeling ice cold. No dysuria, but has decreased urination.  She denies any recent menses, states that she doesn't get periods after having IUD placed. \"I don't want to get sent to the Emergency Department and go home with no answers.\"  The thing that scares her is that she feels so weak that she can't shower or move around her house. Has occasional cough that is new, productive. She is on gabapentin, Latuda, clonidine, Buspar and Topamax. No anticoagulation. No history of DVT or PE.    I have reviewed the Medications, Allergies, Past Medical and Surgical History, and Social History in the [employer] system.  Past Medical History:   Diagnosis Date     ADD (attention deficit disorder)      Anorexia nervosa with bulimia     history of; on Topamax     Anxiety      Borderline personality disorder (H)      Depression      Depressive disorder      H/O self-harm      Lives in independent group home     due to debilitating mental illness     Migraine without aura     no known triggers; on Topamax bid and Imitrex PRN     Morbid obesity (H)      PTSD (post-traumatic stress disorder)      Rectal foreign " body - Recurrent issue, self placed      Swallowed foreign body - Recurrent issue, self placed        Past Surgical History:   Procedure Laterality Date     COMBINED ESOPHAGOSCOPY, GASTROSCOPY, DUODENOSCOPY (EGD), REPLACE ESOPHAGEAL STENT N/A 10/9/2019    Procedure: Upper Endoscopy with Suture Placement;  Surgeon: Zurdo Ramirez MD;  Location: [facility] OR     ESOPHAGOSCOPY, GASTROSCOPY, DUODENOSCOPY (EGD), COMBINED N/A 3/9/2017    Procedure: COMBINED ESOPHAGOSCOPY, GASTROSCOPY, DUODENOSCOPY (EGD), REMOVE FOREIGN BODY;  Surgeon: Avis Guzmán MD;  Location: [facility] OR     ESOPHAGOSCOPY, GASTROSCOPY, DUODENOSCOPY (EGD), COMBINED N/A 4/20/2017    Procedure: COMBINED ESOPHAGOSCOPY, GASTROSCOPY, DUODENOSCOPY (EGD), REMOVE FOREIGN BODY;  EGD removal Foregin body;  Surgeon: Lokesh Paula MD;  Location: [facility] OR     ESOPHAGOSCOPY, GASTROSCOPY, DUODENOSCOPY (EGD), COMBINED N/A 6/12/2017    Procedure: COMBINED ESOPHAGOSCOPY, GASTROSCOPY, DUODENOSCOPY (EGD);  COMBINED ESOPHAGOSCOPY, GASTROSCOPY, DUODENOSCOPY (EGD) [[id number]]attempted removal of foreign body;  Surgeon: Pamela Perez MD;  Location: [facility]     ESOPHAGOSCOPY, GASTROSCOPY, DUODENOSCOPY (EGD), COMBINED N/A 6/9/2017    Procedure: COMBINED ESOPHAGOSCOPY, GASTROSCOPY, DUODENOSCOPY (EGD), REMOVE FOREIGN BODY;  Esophagoscopy, Gastroscopy, Duodenoscopy, Removal of Foreign Body;  Surgeon: Dejon Marsh MD;  Location: [facility]     ESOPHAGOSCOPY, GASTROSCOPY, DUODENOSCOPY (EGD), COMBINED N/A 1/6/2018    Procedure: COMBINED ESOPHAGOSCOPY, GASTROSCOPY, DUODENOSCOPY (EGD), REMOVE FOREIGN BODY;  COMBINED ESOPHAGOSCOPY, GASTROSCOPY, DUODENOSCOPY (EGD) [by pascal net and snare with profol sedation;  Surgeon: Feliciano Emmanuel MD;  Location:  GI     ESOPHAGOSCOPY, GASTROSCOPY, DUODENOSCOPY (EGD), COMBINED N/A 3/19/2018    Procedure: COMBINED ESOPHAGOSCOPY, GASTROSCOPY, DUODENOSCOPY (EGD), REMOVE FOREIGN BODY;   Esophagodscopy,  Gastroscopy, Duodenoscopy,Foreign Body Removal;  Surgeon: Brice Guzmán MD;  Location: [facility]     ESOPHAGOSCOPY, GASTROSCOPY, DUODENOSCOPY (EGD), COMBINED N/A 4/16/2018    Procedure: COMBINED ESOPHAGOSCOPY, GASTROSCOPY, DUODENOSCOPY (EGD), REMOVE FOREIGN BODY;  Esophagogastroduodenoscopy  Foreign Body Removal X 2;  Surgeon: Royer Moise MD;  [facility]: [facility]     ESOPHAGOSCOPY, GASTROSCOPY, DUODENOSCOPY (EGD), COMBINED N/A 6/1/2018    Procedure: COMBINED ESOPHAGOSCOPY, GASTROSCOPY, DUODENOSCOPY (EGD), REMOVE FOREIGN BODY;  COMBINED ESOPHAGOSCOPY, GASTROSCOPY, DUODENOSCOPY with removal of foreign body, propofol sedation by anesthesia;  Surgeon: Brice Martinez MD;  Location:  GI     ESOPHAGOSCOPY, GASTROSCOPY, DUODENOSCOPY (EGD), COMBINED N/A 7/25/2018    Procedure: COMBINED ESOPHAGOSCOPY, GASTROSCOPY, DUODENOSCOPY (EGD), REMOVE FOREIGN BODY;;  Surgeon: Candy Castelan MD;  Location:  GI     ESOPHAGOSCOPY, GASTROSCOPY, DUODENOSCOPY (EGD), COMBINED N/A 7/28/2018    Procedure: COMBINED ESOPHAGOSCOPY, GASTROSCOPY, DUODENOSCOPY (EGD), REMOVE FOREIGN BODY;  COMBINED ESOPHAGOSCOPY, GASTROSCOPY, DUODENOSCOPY (EGD), REMOVE FOREIGN BODY;  Surgeon: Brice Guzmán MD;  [facility]: [facility]     ESOPHAGOSCOPY, GASTROSCOPY, DUODENOSCOPY (EGD), COMBINED N/A 7/31/2018    Procedure: COMBINED ESOPHAGOSCOPY, GASTROSCOPY, DUODENOSCOPY (EGD);  COMBINED ESOPHAGOSCOPY, GASTROSCOPY, DUODENOSCOPY (EGD) TO REMOVE FOREIGN BODY;  Surgeon: Lokesh Paula MD;  [facility]: [facility]     ESOPHAGOSCOPY, GASTROSCOPY, DUODENOSCOPY (EGD), COMBINED N/A 8/4/2018    Procedure: COMBINED ESOPHAGOSCOPY, GASTROSCOPY, DUODENOSCOPY (EGD), REMOVE FOREIGN BODY;   combined esophagoscopy, gastroscopy, duodenoscopy, REMOVE FOREIGN BODY ;  Surgeon: Lokesh Paula MD;  [facility]: [facility]     ESOPHAGOSCOPY, GASTROSCOPY, DUODENOSCOPY (EGD), COMBINED N/A 10/6/2019    Procedure: ESOPHAGOGASTRODUODENOSCOPY (EGD) with fireign body removal x2,  esophageal stent placement with floroscopy;  Surgeon: Timoteo Espana MD;  Location: [facility]     EXAM UNDER ANESTHESIA ANUS N/A 1/10/2017    Procedure: EXAM UNDER ANESTHESIA ANUS;  Surgeon: Annmarie Haynes MD;  [facility]: [facility]     EXAM UNDER ANESTHESIA RECTUM N/A 7/19/2018    Procedure: EXAM UNDER ANESTHESIA RECTUM;  EXAM UNDER ANESTHESIA, REMOVAL OF RECTAL FOREIGN BODY;  Surgeon: Annmarie Haynes MD;  Location: [facility]     HC REMOVE FECAL IMPACTION OR FB W ANESTHESIA N/A 12/18/2016    Procedure: REMOVE FECAL IMPACTION/FOREIGN BODY UNDER ANESTHESIA;  Surgeon: Soham Cano MD;  Location: [facility] OR     KNEE SURGERY - removed a small tissue mass from knee Right      LAPAROSCOPIC ABLATION ENDOMETRIOSIS       LAPAROTOMY EXPLORATORY N/A 1/10/2017    Procedure: LAPAROTOMY EXPLORATORY;  Surgeon: Annmarie Haynes MD;  Location: [facility]     LAPAROTOMY EXPLORATORY  09/11/2019    Manual manipulation of bowel to remove pill bottle in rectum     lymph nodes removed from neck; benign  age 7     MAMMOPLASTY REDUCTION Bilateral      RELEASE CARPAL TUNNEL Bilateral      SIGMOIDOSCOPY FLEXIBLE N/A 1/10/2017    Procedure: SIGMOIDOSCOPY FLEXIBLE;  Surgeon: Annmarie Haynes MD;  Location: [facility]     SIGMOIDOSCOPY FLEXIBLE N/A 5/8/2018    Procedure: SIGMOIDOSCOPY FLEXIBLE;  flex sig with foreign body removal using snare and rattooth forcep;  Surgeon: Soham Cano MD;  Location:  GI     SIGMOIDOSCOPY FLEXIBLE N/A 2/20/2019    Procedure: Exam under anesthesia Colonoscopy with attempted  removal of rectal foreign body;  Surgeon: Estrada Chávez MD;  Location: [facility]       Family History   Problem Relation Age of Onset     Diabetes Type 2  Maternal Grandmother      Diabetes Type 2  Paternal Grandmother      Breast Cancer Paternal Grandmother      Cerebrovascular Disease Father 53     Myocardial Infarction No family hx of      Coronary Artery Disease Early Onset No family hx of      Ovarian Cancer No  "family hx of      Colon Cancer No family hx of        Social History     Tobacco Use     Smoking status: Never Smoker     Smokeless tobacco: Never Used   Substance Use Topics     Alcohol use: No     Alcohol/week: 0.0 standard drinks      Review of Systems   Pertinent positives and negatives are documented in the HPI. All other systems reviewed and are negative.      Physical Exam   BP: 127/78  Pulse: 81  Heart Rate: 89  Temp: 97.2  F (36.2  C)  Resp: 20  Height: 157.5 cm (5' 2\")  Weight: 117.7 kg (259 lb 8 oz)  SpO2: 98 %      Physical Exam  Vitals signs reviewed.   Constitutional:       General: She is not in acute distress.     Appearance: She is well-developed.   HENT:      Head: Normocephalic and atraumatic.      Mouth/Throat:      Mouth: Mucous membranes are dry.      Pharynx: No oropharyngeal exudate or posterior oropharyngeal erythema.      Comments: No posterior oropharynx swelling. Uvula is midline. No sublingual swelling.   Eyes:      Extraocular Movements: Extraocular movements intact.      Conjunctiva/sclera: Conjunctivae normal.      Pupils: Pupils are equal, round, and reactive to light.   Neck:      Musculoskeletal: Normal range of motion and neck supple.   Cardiovascular:      Rate and Rhythm: Normal rate and regular rhythm.      Pulses: Normal pulses.      Heart sounds: Normal heart sounds. No murmur.   Pulmonary:      Effort: Pulmonary effort is normal. No respiratory distress.      Breath sounds: Normal breath sounds. No stridor. No wheezing or rales.   Abdominal:      General: Bowel sounds are normal. There is no distension.      Palpations: Abdomen is soft. There is no mass.      Tenderness: There is no guarding or rebound.      Comments: Mild generalized abdominal tenderness. Healed midline surgical scar with slight erythema along the incision which patient reports is baseline and unchanged since procedure multiple months ago. No swelling, crepitus, or tenderness along the incision site.  "   Musculoskeletal: Normal range of motion.         General: No swelling or tenderness.   Skin:     General: Skin is warm and dry.      Capillary Refill: Capillary refill takes less than 2 seconds.      Findings: No rash.   Neurological:      General: No focal deficit present.      Mental Status: She is alert and oriented to person, place, and time.      GCS: GCS eye subscore is 4. GCS verbal subscore is 5. GCS motor subscore is 6.      Cranial Nerves: No cranial nerve deficit.      Sensory: No sensory deficit.      Motor: No weakness.   Psychiatric:      Comments: Tearful, denies any suicidal or homicidal ideation.          ED Course        Procedures             EKG Interpretation:      Interpreted by Katie Greer MD  Time reviewed: 1645  Symptoms at time of EKG: chest pain   Rhythm: normal sinus   Rate: normal  Axis: normal  Ectopy: none  Conduction: normal  ST Segments/ T Waves: Inverted T wave in lead III, no other ST changes  Q Waves: lead III  Comparison to prior: Unchanged from 7/19/18    Clinical Impression: No evidence of acute ischemia.           Critical Care time:  none             Labs Ordered and Resulted from Time of ED Arrival Up to the Time of Departure from the ED   COMPREHENSIVE METABOLIC PANEL - Abnormal; Notable for the following components:       Result Value    Glucose 118 (*)     Albumin 3.1 (*)     All other components within normal limits   ROUTINE UA WITH MICROSCOPIC - Abnormal; Notable for the following components:    Specific Gravity Urine >1.035 (*)     Protein Albumin Urine 10 (*)     Squamous Epithelial /HPF Urine 3 (*)     Mucous Urine Present (*)     All other components within normal limits   CREATININE POCT - Abnormal; Notable for the following components:    Creatinine 0.5 (*)     All other components within normal limits   ISTAT  GASES LACTATE JENNIFFER POCT - Abnormal; Notable for the following components:    PCO2 Venous 36 (*)     All other components within normal limits    CBC WITH PLATELETS DIFFERENTIAL   LIPASE   HCG QUALITATIVE   TROPONIN I   ISTAT CREATININE NURSING POCT   ISTAT CG4 GASES LACTATE JENNIFFER NURSING POCT     CT Abdomen Pelvis w Contrast   Final Result   IMPRESSION:    1. No evidence for  bowel obstruction.   2. Small collection in the subcutaneous midline lower anterior   abdominal wall measuring up to 3.1 cm, associated with fatty   streakiness. No intra-abdominal extension.   3. No unexpected radiopaque foreign body in the abdomen or pelvis   identified.   4. Stent in the esophagus partially visualized with some wall   thickening. Please see separate CT chest same date.      I have personally reviewed the examination and initial interpretation   and I agree with the findings.      ROLANDA MCCLAIN MD      CT Chest Pulmonary Embolism w Contrast   Final Result   Impression:       1. No evidence for pulmonary embolism or aortic pathology.   2. Similar appearance of the esophageal stent compared with CT   10/12/2019. There is similar foci of air under the left main bronchus.   No significant increase in the amount of air to suggest perforation.   3. Trace left pleural effusion.       I have personally reviewed the examination and initial interpretation   and I agree with the findings.      RICHELLE JACKSON MD                 Assessments & Plan (with Medical Decision Making)   On exam, patient is in no acute distress.  She is tearful and somewhat anxious during her interview but denies any suicidal or homicidal ideation.  Unfortunate the patient has a long-standing history of ingesting foreign bodies and had a recent esophageal foreign body removal with a stent placement who is now been seen in the ER 3 times for concern of continued pain with now lack of bowel movement and inability to stay orally hydrated at home.  She has been on antibiotics which she has been taking.  She is complaining of both abdominal pain as well as back pain wrapping around to the front in her  chest area and states she has not had a bowel movement.  She states she is also been unable to keep any food or liquids down and is supposed been a clear liquid diet but has been unable to do so.  We were concerned about possible perforation or complication with the esophageal stent or potential occlusion of the stent.  We also considered bowel obstruction as well as she has had multiple abdominal surgeries for foreign bodies also.  Her last surgery was in September over a month ago.  She does not have any abdominal pain over that healing surgical site.  We did discuss the patient with the thoracic surgery team who performed her most recent intervention and they agreed with obtaining a CT of the chest with contrast as well as the abdomen and pelvis.  Laboratory studies are largely unrevealing As above.  EKG was obtained which did not reveal any evidence of acute ischemia.  I have low suspicion for acute coronary syndrome as the cause of her presentation.  Her troponin was negative and she has had constant pain for multiple days now.  Her white blood cell count is 9.9 with a hemoglobin of 12.9.  Urine pregnancy test is negative. CT of the chest did not reveal any evidence of PE or aortic pathology.  There was a similar appearance of the esophageal stent with a small foci of air under the left main bronchus which is also unchanged from recent Ct on 10/12.  There is a trace left pleural effusion which was also previously present.  CT of the abdomen and pelvis did not reveal any evidence of bowel obstruction or foreign bodies.  There was a small collection in the subcutaneous midline lower anterior abdominal wall associated with some fatty streakiness.  There is no intra-abdominal extension.  I did evaluate the patient's healing surgical incision and there is no obvious sign of infection.  She states there has been some baseline redness that is unchanged.  She does not have any tenderness or crepitus along the incision  site and I am not able to palpate any fluid collection induration or fluctuance.  There is no drainage from the incision site.  I did have the patient evaluated by the thoracic surgery team and the resident is also a surgery resident so he evaluated her midline incision on her abdomen as well and agreed that there was no acute sign of infection at this time and that area.  She likely is suffering from significant constipation as she took a dose of Imodium and has been taking opioid pain medications.  Her urine however is concentrated indicating her described dehydration.  She was given 2 IV fluids here and then finally urinated however this was delayed. There is no sign of UTI..  She was given oral Tylenol, IV Zofran, IV Toradol, and her home oral oxycodone as well as IV Toradol with some minimal improvement in her discomfort.  The thoracic team did agree that she likely warrants admission for IV hydration and bowel regiment and observation and possibly may need to further evaluation if she continues to have pain such as swallow study or possible esophagram.  They however did not feel that she warranted admission to their service.  Thus I discussed the patient with the hospitalist.  The hospitalist asked to also speak with the attending thoracic surgeon and thus a three-way call was initiated.  After this discussion, the thoracic team still did not feel she warranted admission to their service as they did not feel there was any surgical intervention needed at this time and the patient was admitted to the hospitalist service.  She was in agreement with this plan.  She did have a one-to-one attendant throughout her stay due to concern for foreign body ingestions in the past.  She was transferred to the floor in stable condition.  She remained hemodynamically stable and within normal limits throughout her emergency department stay.    I have reviewed the nursing notes.    I have reviewed the findings, diagnosis, plan  and need for follow up with the patient.        Final diagnoses:   Dehydration   Constipation, unspecified constipation type       10/15/2019   [facility], [place], EMERGENCY DEPARTMENT    Katie Greer MD  11/15/19 [id number]

## 2019-10-15 NOTE — PROGRESS NOTES
GI received triage note, forwarded to [facility] who did a procedure recently.    Forwarded to Dr. Ramirez  Please refer back to Jovi or ROSI    Left message to call clinic for directions or with further symptoms    Carli Francisco, [facility] Care Coordinator

## 2019-10-15 NOTE — ED TRIAGE NOTES
Pt BIBA c/o throat pain and inability to maintain nutrition. Recent esophogeal perforation after swallowing paper clip. Hx of swallowing foreign objects. States she got stitches and a stent. Pt anxious on arrival. EMS gave 50mcg Fentanly IN, 1mg Ativan IN.

## 2019-10-16 ENCOUNTER — APPOINTMENT (OUTPATIENT)
Dept: GENERAL RADIOLOGY | Facility: CLINIC | Age: 29
DRG: 392 | End: 2019-10-16
Attending: PHYSICIAN ASSISTANT
Payer: COMMERCIAL

## 2019-10-16 ENCOUNTER — APPOINTMENT (OUTPATIENT)
Dept: SPEECH THERAPY | Facility: CLINIC | Age: 29
DRG: 392 | End: 2019-10-16
Payer: COMMERCIAL

## 2019-10-16 LAB
ANION GAP SERPL CALCULATED.3IONS-SCNC: 7 MMOL/L (ref 3–14)
BUN SERPL-MCNC: 8 MG/DL (ref 7–30)
CALCIUM SERPL-MCNC: 8.4 MG/DL (ref 8.5–10.1)
CHLORIDE SERPL-SCNC: 113 MMOL/L (ref 94–109)
CO2 SERPL-SCNC: 22 MMOL/L (ref 20–32)
CREAT SERPL-MCNC: 0.58 MG/DL (ref 0.52–1.04)
ERYTHROCYTE [DISTWIDTH] IN BLOOD BY AUTOMATED COUNT: 14.9 % (ref 10–15)
GFR SERPL CREATININE-BSD FRML MDRD: >90 ML/MIN/{1.73_M2}
GLUCOSE SERPL-MCNC: 116 MG/DL (ref 70–99)
HCT VFR BLD AUTO: 37.5 % (ref 35–47)
HGB BLD-MCNC: 11.7 G/DL (ref 11.7–15.7)
MCH RBC QN AUTO: 28.3 PG (ref 26.5–33)
MCHC RBC AUTO-ENTMCNC: 31.2 G/DL (ref 31.5–36.5)
MCV RBC AUTO: 91 FL (ref 78–100)
PLATELET # BLD AUTO: 258 10E9/L (ref 150–450)
POTASSIUM SERPL-SCNC: 3.6 MMOL/L (ref 3.4–5.3)
RBC # BLD AUTO: 4.13 10E12/L (ref 3.8–5.2)
SODIUM SERPL-SCNC: 141 MMOL/L (ref 133–144)
WBC # BLD AUTO: 8.1 10E9/L (ref 4–11)

## 2019-10-16 PROCEDURE — 25000128 H RX IP 250 OP 636: Performed by: STUDENT IN AN ORGANIZED HEALTH CARE EDUCATION/TRAINING PROGRAM

## 2019-10-16 PROCEDURE — 25000132 ZZH RX MED GY IP 250 OP 250 PS 637: Performed by: FAMILY MEDICINE

## 2019-10-16 PROCEDURE — 36415 COLL VENOUS BLD VENIPUNCTURE: CPT | Performed by: FAMILY MEDICINE

## 2019-10-16 PROCEDURE — 12000001 ZZH R&B MED SURG/OB UMMC

## 2019-10-16 PROCEDURE — 92611 MOTION FLUOROSCOPY/SWALLOW: CPT | Mod: GN

## 2019-10-16 PROCEDURE — 85027 COMPLETE CBC AUTOMATED: CPT | Performed by: FAMILY MEDICINE

## 2019-10-16 PROCEDURE — 25000132 ZZH RX MED GY IP 250 OP 250 PS 637: Performed by: STUDENT IN AN ORGANIZED HEALTH CARE EDUCATION/TRAINING PROGRAM

## 2019-10-16 PROCEDURE — 74230 X-RAY XM SWLNG FUNCJ C+: CPT

## 2019-10-16 PROCEDURE — 25800030 ZZH RX IP 258 OP 636: Performed by: FAMILY MEDICINE

## 2019-10-16 PROCEDURE — 80048 BASIC METABOLIC PNL TOTAL CA: CPT | Performed by: FAMILY MEDICINE

## 2019-10-16 PROCEDURE — 25000128 H RX IP 250 OP 636: Performed by: FAMILY MEDICINE

## 2019-10-16 RX ORDER — HEPARIN SODIUM,PORCINE 10 UNIT/ML
5-10 VIAL (ML) INTRAVENOUS EVERY 24 HOURS
Status: DISCONTINUED | OUTPATIENT
Start: 2019-10-16 | End: 2019-10-17 | Stop reason: HOSPADM

## 2019-10-16 RX ORDER — KETOROLAC TROMETHAMINE 15 MG/ML
15 INJECTION, SOLUTION INTRAMUSCULAR; INTRAVENOUS EVERY 6 HOURS PRN
Status: DISCONTINUED | OUTPATIENT
Start: 2019-10-16 | End: 2019-10-17 | Stop reason: HOSPADM

## 2019-10-16 RX ORDER — HEPARIN SODIUM,PORCINE 10 UNIT/ML
5-10 VIAL (ML) INTRAVENOUS
Status: DISCONTINUED | OUTPATIENT
Start: 2019-10-16 | End: 2019-10-17 | Stop reason: HOSPADM

## 2019-10-16 RX ORDER — OXYCODONE HCL 5 MG/5 ML
5 SOLUTION, ORAL ORAL EVERY 8 HOURS PRN
Status: DISCONTINUED | OUTPATIENT
Start: 2019-10-16 | End: 2019-10-17 | Stop reason: HOSPADM

## 2019-10-16 RX ORDER — POLYETHYLENE GLYCOL 3350 17 G/17G
17 POWDER, FOR SOLUTION ORAL 2 TIMES DAILY
Status: DISCONTINUED | OUTPATIENT
Start: 2019-10-16 | End: 2019-10-17 | Stop reason: HOSPADM

## 2019-10-16 RX ORDER — LIDOCAINE 40 MG/G
CREAM TOPICAL
Status: DISCONTINUED | OUTPATIENT
Start: 2019-10-16 | End: 2019-10-17 | Stop reason: HOSPADM

## 2019-10-16 RX ORDER — HEPARIN SODIUM (PORCINE) LOCK FLUSH IV SOLN 100 UNIT/ML 100 UNIT/ML
5 SOLUTION INTRAVENOUS
Status: DISCONTINUED | OUTPATIENT
Start: 2019-10-16 | End: 2019-10-17 | Stop reason: HOSPADM

## 2019-10-16 RX ADMIN — CLINDAMYCIN PALMITATE HYDROCHLORIDE 300 MG: 75 SOLUTION ORAL at 16:02

## 2019-10-16 RX ADMIN — SENNOSIDES AND DOCUSATE SODIUM 2 TABLET: 8.6; 5 TABLET ORAL at 08:04

## 2019-10-16 RX ADMIN — KETOROLAC TROMETHAMINE 15 MG: 15 INJECTION, SOLUTION INTRAMUSCULAR; INTRAVENOUS at 16:09

## 2019-10-16 RX ADMIN — BUSPIRONE HYDROCHLORIDE 10 MG: 5 TABLET ORAL at 20:09

## 2019-10-16 RX ADMIN — ONDANSETRON HYDROCHLORIDE 4 MG: 2 INJECTION, SOLUTION INTRAMUSCULAR; INTRAVENOUS at 12:19

## 2019-10-16 RX ADMIN — Medication 5 ML: at 12:20

## 2019-10-16 RX ADMIN — OXYCODONE HYDROCHLORIDE 5 MG: 5 SOLUTION ORAL at 20:09

## 2019-10-16 RX ADMIN — METFORMIN HYDROCHLORIDE 500 MG: 500 TABLET, EXTENDED RELEASE ORAL at 10:04

## 2019-10-16 RX ADMIN — Medication 5 ML: at 22:39

## 2019-10-16 RX ADMIN — LEVOFLOXACIN 500 MG: 25 SOLUTION ORAL at 08:04

## 2019-10-16 RX ADMIN — CLINDAMYCIN PALMITATE HYDROCHLORIDE 300 MG: 75 SOLUTION ORAL at 04:12

## 2019-10-16 RX ADMIN — GABAPENTIN 600 MG: 300 CAPSULE ORAL at 08:04

## 2019-10-16 RX ADMIN — DESVENLAFAXINE 100 MG: 50 TABLET, FILM COATED, EXTENDED RELEASE ORAL at 08:04

## 2019-10-16 RX ADMIN — ACETAMINOPHEN 650 MG: 325 TABLET, FILM COATED ORAL at 16:09

## 2019-10-16 RX ADMIN — BUSPIRONE HYDROCHLORIDE 10 MG: 5 TABLET ORAL at 08:04

## 2019-10-16 RX ADMIN — CLINDAMYCIN PALMITATE HYDROCHLORIDE 300 MG: 75 SOLUTION ORAL at 21:39

## 2019-10-16 RX ADMIN — LURASIDONE HYDROCHLORIDE 60 MG: 40 TABLET, FILM COATED ORAL at 21:40

## 2019-10-16 RX ADMIN — SENNOSIDES AND DOCUSATE SODIUM 1 TABLET: 8.6; 5 TABLET ORAL at 20:08

## 2019-10-16 RX ADMIN — CLINDAMYCIN PALMITATE HYDROCHLORIDE 300 MG: 75 SOLUTION ORAL at 10:04

## 2019-10-16 RX ADMIN — CLONIDINE HYDROCHLORIDE 0.1 MG: 0.1 TABLET ORAL at 08:04

## 2019-10-16 RX ADMIN — KETOROLAC TROMETHAMINE 15 MG: 15 INJECTION, SOLUTION INTRAMUSCULAR; INTRAVENOUS at 10:55

## 2019-10-16 RX ADMIN — FLUCONAZOLE 200 MG: 40 POWDER, FOR SUSPENSION ORAL at 08:03

## 2019-10-16 RX ADMIN — GABAPENTIN 600 MG: 300 CAPSULE ORAL at 16:02

## 2019-10-16 RX ADMIN — TOPIRAMATE 100 MG: 50 TABLET ORAL at 21:39

## 2019-10-16 RX ADMIN — ACETAMINOPHEN 650 MG: 325 TABLET, FILM COATED ORAL at 21:49

## 2019-10-16 RX ADMIN — CLONIDINE HYDROCHLORIDE 0.1 MG: 0.1 TABLET ORAL at 20:08

## 2019-10-16 RX ADMIN — OXYCODONE HYDROCHLORIDE 5 MG: 5 SOLUTION ORAL at 07:03

## 2019-10-16 RX ADMIN — GABAPENTIN 600 MG: 300 CAPSULE ORAL at 21:39

## 2019-10-16 RX ADMIN — POTASSIUM CHLORIDE, DEXTROSE MONOHYDRATE AND SODIUM CHLORIDE: 150; 5; 450 INJECTION, SOLUTION INTRAVENOUS at 08:03

## 2019-10-16 RX ADMIN — KETOROLAC TROMETHAMINE 15 MG: 15 INJECTION, SOLUTION INTRAMUSCULAR; INTRAVENOUS at 22:39

## 2019-10-16 RX ADMIN — POTASSIUM CHLORIDE 20 MEQ: 750 TABLET, EXTENDED RELEASE ORAL at 16:03

## 2019-10-16 RX ADMIN — OMEPRAZOLE 20 MG: KIT at 08:03

## 2019-10-16 ASSESSMENT — ENCOUNTER SYMPTOMS
CONSTIPATION: 1
NAUSEA: 0
ABDOMINAL PAIN: 1
BLOOD IN STOOL: 0
DIARRHEA: 0
SHORTNESS OF BREATH: 0
VOMITING: 0
BACK PAIN: 1

## 2019-10-16 ASSESSMENT — ACTIVITIES OF DAILY LIVING (ADL)
ADLS_ACUITY_SCORE: 9
ADLS_ACUITY_SCORE: 9
ADLS_ACUITY_SCORE: 8
ADLS_ACUITY_SCORE: 8
ADLS_ACUITY_SCORE: 8.5
ADLS_ACUITY_SCORE: 8

## 2019-10-16 ASSESSMENT — MIFFLIN-ST. JEOR
SCORE: 1884.39
SCORE: 1922.03

## 2019-10-16 NOTE — PROGRESS NOTES
NEURO: A&O x4  RESPIRATORY: Lung sounds clear  CARDIAC: WDL  GI/: C/o nausea, PRN compazine given. Per pt, has not eaten in several days due to throat pain and nausea. Voiding spontaneously.   DIET: NPO with ice chips  PAIN/NAUSEA: Intermittent nausea, c/o pain in throat when swallowing   IV ACCESS: Port in right chest, infusing KCl  ACTIVITY: Up ad rainer, bedside sitter present for behavioral safety  LAB: BMP, CBC with platelets, Mag, potassium labs in AM   PLAN: Monitor for behavioral issues, throat pain, and nausea

## 2019-10-16 NOTE — PROGRESS NOTES
"Surgery Progress Note         Subjective:  NAEON. VSS. Not eating d/t pain. Describes pain as sensation of \"just there.\" Reports last BM was Friday (10/11). Nausea, no emesis. Chills overnight.     Objective:  Temp:  [97.2  F (36.2  C)-98  F (36.7  C)] 97.8  F (36.6  C)  Pulse:  [70-94] 94  Heart Rate:  [81-89] 81  Resp:  [16-20] 18  BP: (102-137)/() 102/54  SpO2:  [91 %-100 %] 95 %  No intake/output data recorded.    Physical exam:  Gen: Awake, alert, NAD   CV: RRR  Resp: non-labored at rest  Abd: Soft, obese, non-distended, NTTP  Skin: midline incision healing appropriately with granulation tissue and no erythema, no open sores, lesions or ulcerations    A/P: Nevin Alvarado is a 29 y/o female with complex psychosocial history, including foreign body ingestion and rectal insertion requiring procedural removal. She has a recent history of a midline laparotomy (09/11 to remove foreign body from colon / rectum) and an esophageal stent placement for esophageal perforation after ingesting a paperclip on 10/05. She was discharged from the hospital on 10/11 and has had difficulty maintaining her oral intake since then. She is found to have a seroma on CT obtained to evaluate complaints of decreased appetite and worsening left-sided chest and back pain after esophageal perforation.    - IV NS and D5 + KCl for hydration  - Bowel regimen  - Small seroma can be managed expectantly       Seen and discussed with Dr. Sagar Yeung, PGY2, who will discuss with staff.    Jesus Herrera, MS3  [facility]    "

## 2019-10-16 NOTE — PLAN OF CARE
Vital signs:  Temp: 97.8  F (36.6  C) Temp src: Axillary BP: 102/54 Pulse: 94 Heart Rate: 81 Resp: 18 SpO2: 95 % O2 Device: None (Room air)    Activity: Up independently.  Neuros: A&O x4. Bedside attendant.   Cardiac: WDL.   Respiratory: WDL on RA.   GI/: Voiding spontaneously. Hypoactive BS, not passing flatus.   Diet: NPO ex ice.   Lines: Right single Port infusing D5 1/2NS+20KCl @ 100 mL/hr.   Pain/nausea: Oxy given x1. Sleeping most of night.  Plan: Advance to clear liquids?

## 2019-10-16 NOTE — PROGRESS NOTES
"THORACIC & FOREGUT SURGERY    S:  No overnight events.  Pt seen at bedside. C/o 8/10 pain in her back she states is new and has been present since the stent was placed 10/6/19. States she is able to swallow, but has pain and nausea associated with it. She also reports she has not had a bowel movement since last Friday (10/11).    O:  Vitals:    10/15/19 2003 10/15/19 2112 10/15/19 2300 10/16/19 0720   BP: 129/79 124/87 102/54 118/66   BP Location:   Left arm Left arm   Pulse: 85 94     Resp: 18 18 18 16   Temp: 98  F (36.7  C) 97.9  F (36.6  C) 97.8  F (36.6  C) 98.5  F (36.9  C)   TempSrc:   Axillary Oral   SpO2: 97% 97% 95% 96%   Weight:  117.7 kg (259 lb 8 oz)     Height:  1.575 m (5' 2\")         A&Ox3, NAD  Breathing non-labored  RRR  Soft, NDNT  Distal extremities warm    A/P: Nevin ZAN Jenny is a 28 year old female who underwent esophageal stent placement on 10/6/19 for an esophageal perforati after a foreign body retrieval. She has a complex psychosocial history including multiple foreign body ingestions/insertions requiring surgical retrieval. She has presented to EDs multiple times since discharge. She presented yesterday with abdominal pain. CT reviewed, afebrile, nml WBC.     -Cares per primary  -Will order swallow with esophogram to confirm stent adequacy   -Thoracic will continue to follow    Discussed with staff    Tao Vaz PA-C  Thoracic Surgery    "

## 2019-10-16 NOTE — PROGRESS NOTES
[facility], [facility] Progress Note    Main Plans for Today   - ADAT  - Discontinue mIVF     Assessment & Plan   Nevin Alvarado is a 28 year old female admitted on 10/15/2019. She has a history of complex psychosocial history including depression and anxiety as well as foreign body ingestion and rectal insertion requiring multiple ex laps and recent esophageal perforation and s/p stent 10/9/2019, presenting with ongoing abdominal pain. Thoracic surgery consulted, determined no surgical interventions, s/s likely 2/2 constipation.     #Abd/back pain after recent esophageal stent placement  After reviewing the CT chest abdomen with a surgical team, it seems like her symptoms are secondary to constipation and recommended focusing on hydration and bowel regimen for now.  She was previously recommended a clear liquid diet with an advanced as tolerated regimen however has not been able to tolerate and currently will remain n.p.o. with ice chips however with plans to advance to clear liquid diet in the morning.  Electrolytes and complete blood count within normal ranges.  She had some very concentrated urine output after 2 L of normal saline in the ER.  - Thoracic surgery following, appreciate recs  - ADAT  - Discontinue mIVF  - Senna and Colace for bowel regimen  - Miralax BID  - PTA oxycodone 5 mg every 4 hours as needed,  verified starting 10/11/2019. Will try to wean while here given constipation.  - Continue toradol 15 mg q6h prn for opioid sparing  - Finish prior course of clinda 300 QID, levaquin 500 mg every day, diflucan 200 mg every day to end 10/18/2019 (total 7 day course)  - PTA prilosec 20 mg daily.     # Complex psychosocial history  # Depression and anxiety  # Compulsive foreign object ingestion, rectal insertion  Previously seen by psychiatry during her last hospital admission recommended DBT as an outpatient with follow-up closely with her  primary psychiatrist.  There is no indication for inpatient psychiatric admission.  - Continue home dose of buspirone 10 BID, clonidine 0.1 BID, desvenlafaxine 100 qd, and topiramate 100 qhs, gabapentin 600 TID  - Continue bedside sitter    # Neuropathy  Receiving Gamunex C for this - per patient q2week, next due 10/21.  R chest wall portacath in place since June.    # Pain Assessment:  Current Pain Score 10/16/2019   Patient currently in pain? yes   Pain score (0-10) -   Pain location -   Pain descriptors Sharp   - Nevin is experiencing pain due to abdominal pain. Pain management was discussed and the plan was created in a collaborative fashion.  Nevin's response to the current recommendations: compliant  - Please see the plan for pain management as documented above        Diet: Advance Diet as Tolerated: Clear Liquid Diet  Fluids: PO  DVT Prophylaxis: Pneumatic Compression Devices  Code Status: Full Code  Lines: R chest wall portacath    Disposition Plan   Expected discharge:  Tomorrow, recommended to prior living arrangement once adequate pain management/ tolerating PO medications.Dispo:          Entered: Elvira Phelan 10/16/2019, 8:06 AM   Information in the above section will display in the discharge planner report.      The patient's care was discussed with the Attending Physician, Dr. Ramsey.    Elvira Phelan  [facility]:   Pager: [phone]  Please see sticky note for cross cover information    Interval History    Feels about the same - pain L scapula, mid abdomen.  Has not had anything to eat or drink overnight. Feels ready to try  gamunex c infusion due 10/21-22, via port.  Was supposed to have psychiatry appt today at [facility].    Review of Systems   Respiratory: Negative for shortness of breath.    Cardiovascular: Negative for chest pain.   Gastrointestinal: Positive for abdominal pain and constipation. Negative for blood in stool, diarrhea, nausea and  vomiting.   Musculoskeletal: Positive for back pain.       Physical Exam   Vital Signs: Temp: 98.5  F (36.9  C) Temp src: Oral BP: 118/66 Pulse: 94 Heart Rate: 71 Resp: 16 SpO2: 96 % O2 Device: None (Room air)    Weight: 259 lbs 8 oz  Physical Exam  Constitutional:       General: She is not in acute distress.     Appearance: She is well-developed. She is not diaphoretic.   HENT:      Head: Normocephalic and atraumatic.   Eyes:      Conjunctiva/sclera: Conjunctivae normal.   Neck:      Musculoskeletal: Normal range of motion.   Cardiovascular:      Rate and Rhythm: Normal rate and regular rhythm.      Heart sounds: No murmur.   Pulmonary:      Effort: Pulmonary effort is normal. No respiratory distress.      Comments: R chest wall port in place  Abdominal:      Palpations: Abdomen is soft.      Tenderness: There is no tenderness.   Musculoskeletal: Normal range of motion.      Comments: LE edema 1+ pitting   Neurological:      Mental Status: She is alert and oriented to person, place, and time.   Psychiatric:         Mood and Affect: Mood normal.         Behavior: Behavior normal.         Thought Content: Thought content normal.         Judgement: Judgment normal.             Data   Recent Labs   Lab 10/16/19  0655 10/15/19  1242 10/12/19  1419   WBC 8.1 9.9 7.9   HGB 11.7 12.9 11.0*   MCV 91 88 93    275 222    138 144   POTASSIUM 3.6 3.5 3.5   CHLORIDE 113* 108 111*   CO2 22 23 27   BUN 8 8 15   CR 0.58 0.56 0.76   ANIONGAP 7 7 6   KELBY 8.4* 8.9 8.5   * 118* 101*   ALBUMIN  --  3.1*  --    PROTTOTAL  --  7.6  --    BILITOTAL  --  0.3  --    ALKPHOS  --  63  --    ALT  --  20  --    AST  --  14  --    LIPASE  --  146  --    TROPI  --  <0.015  --

## 2019-10-16 NOTE — PROGRESS NOTES
"CLINICAL NUTRITION SERVICES - BRIEF  NOTE    REASON FOR ASSESSMENT  Nevin Alvarado is a/an 28 year old female assessed by the dietitian for Admission Nutrition Risk Screen for reduced oral intake over the last month. - Inappropriate nutrition risk screen     Per chart review, bedside RN note: \"per pt, has not eaten in several days due to throat pain and nausea.\"    Patient is known to clinical nutrition services from previous admission from 10/5 - 10/11. Prior to previous admission, patient was eating well without any nutritional changes.        Monitoring/Evaluation  Will monitor and evaluate per protocol (LOS x 7-10 days or if consulted).       Becca Bob RD, LD   5A [phone])/7B floor pager [phone]    "

## 2019-10-16 NOTE — PLAN OF CARE
"/76 (BP Location: Left arm)   Pulse 83   Temp 97.3  F (36.3  C) (Axillary)   Resp 16   Ht 1.575 m (5' 2\")   Wt 117.7 kg (259 lb 8 oz)   LMP 10/15/2017   SpO2 98%   BMI 47.46 kg/m      Neuro: A&O x4. Bedside attendant present.   Cardiac: WNL.   Respiratory: sats 98% on RA.   GI/: +hyperactive BS, not passing flatus, no BM this shift. Voiding spontaneously.   Diet: advanced to regular, tolerating ok. C/O throat pain when swallowing.   Lines: Right chest port- hep locked.   Pain/Nausea: PRN toradol given x1. Interm. Nauseated, zofran given X1  Activity: Up independently.  New changes this shift: swallow study completed down in xray.  Plan: Monitor pt's tolerance with advancement in diet.   "

## 2019-10-16 NOTE — PLAN OF CARE
Discharge Planner SLP   Patient plan for discharge: Unknown  Current status: Videoswallow study completed per MD order to objectively assess oropharyngeal swallow mechanism. Under fluoroscopy, pt presents with a functional oropharyngeal swallow mechanism. Pt assessed with thin omnipaque, thin barium, puree consistency, and higher texture solids. Oral phase WFL. Swallow trigger timely. Once triggered, velar elevation, BOT retraction, and pharyngeal constriction are WFL. Epiglottic inversion complete. No penetration or aspiration observed with any consistency. From SLP perspective, pt cleared for up to regular diet/thin liquids; will defer diet recommendation to MD. Pt to be fully alert and upright for all PO, taking small bites/sips at slow rate. No additional SLP services indicated.  Barriers to return to prior living situation: None from ST perspective  Recommendations for discharge: Defer to MD  Rationale for recommendations: Functional oropharyngeal swallow mechanism       Entered by: Magi Flynn 10/16/2019 3:02 PM

## 2019-10-16 NOTE — H&P
Attestation:  This patient has been seen and evaluated by me, Lyndsey Conner DO on 10/15/19.  I saw and discussed the case with the primary resident and the care team. I agree with the findings and plan in this note. I have reviewed today's vital signs, medications, laboratory results and imaging results.     DO Renee Alvarez's [facility], [place]    History and Physical - Renee's Service        Date of Admission:  10/15/2019    Assessment & Plan    Nevin Alvarado is a 28 year old female admitted on 10/15/2019. She has a history of complex psychosocial history including depression and anxiety as well as foreign body ingestion and rectal insertion requiring ex lap on 9/11/19 of a bottle with last hospitalization last week for removal of a paper clip as well as placement of esophageal stents on 10/9/19.  Now presenting with back pain with radiation to left and some epigastric pain as well as constipation. Her symptoms are likely exacerbated by constipation.     #Abd/back pain after recent esophageal stent placement  After reviewing the CT chest abdomen with a surgical team, it seems like her symptoms are secondary to constipation and recommended focusing on hydration and bowel regimen for now.  She was previously recommended a clear liquid diet with an advanced as tolerated regimen however has not been able to tolerate and currently will remain n.p.o. with ice chips however with plans to advance to clear liquid diet in the morning.  Electrolytes and complete blood count within normal ranges.  She had some very concentrated urine output after 2 L of normal saline in the ER.  -Continue maintenance IV fluids  -N.p.o. with ice chips overnight with plans to advance to clear liquid diet in the morning if tolerates  -Continue home pain regimen with oxycodone 5 mg every 4 hours as needed  -Senna and Colace for bowel regimen  -We will consider enema  -Continue  antibiotics from her previous hospitalization including clindamycin and  Levaquin until 10/18/19.  -Thoracic surgery following    #Complex psychosocial history  #Depression and anxiety  Previously seen by psychiatry during her last hospital admission recommended DBT as an outpatient with follow-up closely with her primary psychiatrist.  There is no indication for inpatient psychiatric admission.  -Continue home dose of buspirone, clonidine, desvenlafaxine, and topiramate.        Diet: CLD  Fluids: D5+1/2NS+20KCL  DVT Prophylaxis: Low Risk/Ambulatory with no VTE prophylaxis indicated  Hazel Catheter: not present  Code Status: Full    Disposition Plan   Expected discharge: 2 - 3 days, recommended to prior living arrangement once adequate pain management/ tolerating PO medications.  Entered: Maddy Roca MD 10/15/2019, 10:24 PM       The patient's care was discussed with the Attending Physician, Dr. Conner.    Maddy Roca MD  [facility], [place]  Pager: [phone]  Please see sticky note for cross cover information  ______________________________________________________________________    Chief Complaint   Left back pain with inability to tolerate PO    History is obtained from the patient    History of Present Illness   Nevin Alvarado is a 28 year old female admitted on 10/15/2019. She has a history of complex psychosocial history including depression and anxiety as well as foreign body ingestion and rectal insertion requiring ex lap on 9/11/19 of a bottle with last hospitalization last week for removal of a paper clip as well as placement of esophageal stents on 10/9/19.  Patient states after the stent placement, she was advanced to quickly to a clear liquid diet and then subsequently full liquid diet with plans to advance her diet at home to a regular diet.  She felt rushed out of the hospital previously as she did not tolerate the liquid diet prior to  discharge.  She states her last bowel movement was nearly 3 days ago and has been nauseous since discharge however has not vomited yet.  She has been taking Zofran around-the-clock.     She decided to return to the emergency room today due to discomfort in her left back along with inability to tolerate p.o.  Over the last 3 days she has not been able to drink much and therefore feels very dehydrated and weak.  She also complains of decreased urine output which is evident by her concentrated urine sample in the ER.  Her vital signs in the emergency department have remained relatively stable and labs did not show any evidence of underlying infection or severe dehydration.  A CT of the  chest and abdomen did not show any evidence of worsening findings with a stent visibly in place.     Patient seen and examined in the ER. No acute events since admission. Afebrile with stable vital signs. Patient able to void after IV bolus and without any evidence of bowel movements. Pain is somewhat controlled with current regimen. Denies any episodes of vomiting, fever, chills, SOB, or significant abdominal pain.      Review of Systems    Skin: negative except as above  Respiratory: negative except as above  Cardiovascular: negative except as above  Gastrointestinal: negative except as above  Genitourinary: negative except as above  Musculoskeletal: negative except as above  Neurologic: negative except as above  Psychiatric: negative except as above  Hematologic/Lymphatic/Immunologic: negative except as above  Endocrine: negative except as above      Past Medical History    I have reviewed this patient's medical history and updated it with pertinent information if needed.   Past Medical History:   Diagnosis Date     ADD (attention deficit disorder)      Anorexia nervosa with bulimia     history of; on Topamax     Anxiety      Borderline personality disorder (H)      Depression      Depressive disorder      H/O self-harm      Lives in  independent group home     due to debilitating mental illness     Migraine without aura     no known triggers; on Topamax bid and Imitrex PRN     Morbid obesity (H)      PTSD (post-traumatic stress disorder)      Rectal foreign body - Recurrent issue, self placed      Swallowed foreign body - Recurrent issue, self placed         Past Surgical History   I have reviewed this patient's surgical history and updated it with pertinent information if needed.  Past Surgical History:   Procedure Laterality Date     COMBINED ESOPHAGOSCOPY, GASTROSCOPY, DUODENOSCOPY (EGD), REPLACE ESOPHAGEAL STENT N/A 10/9/2019    Procedure: Upper Endoscopy with Suture Placement;  Surgeon: Zurdo Ramirez MD;  Location: [facility]     ESOPHAGOSCOPY, GASTROSCOPY, DUODENOSCOPY (EGD), COMBINED N/A 3/9/2017    Procedure: COMBINED ESOPHAGOSCOPY, GASTROSCOPY, DUODENOSCOPY (EGD), REMOVE FOREIGN BODY;  Surgeon: Avis Guzmán MD;  Location: [facility]     ESOPHAGOSCOPY, GASTROSCOPY, DUODENOSCOPY (EGD), COMBINED N/A 4/20/2017    Procedure: COMBINED ESOPHAGOSCOPY, GASTROSCOPY, DUODENOSCOPY (EGD), REMOVE FOREIGN BODY;  EGD removal Foregin body;  Surgeon: Lokesh Paula MD;  Location: [facility]     ESOPHAGOSCOPY, GASTROSCOPY, DUODENOSCOPY (EGD), COMBINED N/A 6/12/2017    Procedure: COMBINED ESOPHAGOSCOPY, GASTROSCOPY, DUODENOSCOPY (EGD);  COMBINED ESOPHAGOSCOPY, GASTROSCOPY, DUODENOSCOPY (EGD) [[id number]]attempted removal of foreign body;  Surgeon: Pamela Perez MD;  [facility]     ESOPHAGOSCOPY, GASTROSCOPY, DUODENOSCOPY (EGD), COMBINED N/A 6/9/2017    Procedure: COMBINED ESOPHAGOSCOPY, GASTROSCOPY, DUODENOSCOPY (EGD), REMOVE FOREIGN BODY;  Esophagoscopy, Gastroscopy, Duodenoscopy, Removal of Foreign Body;  Surgeon: Dejon Marsh MD;  [facility]: [facility]     ESOPHAGOSCOPY, GASTROSCOPY, DUODENOSCOPY (EGD), COMBINED N/A 1/6/2018    Procedure: COMBINED ESOPHAGOSCOPY, GASTROSCOPY, DUODENOSCOPY (EGD), REMOVE FOREIGN BODY;   COMBINED ESOPHAGOSCOPY, GASTROSCOPY, DUODENOSCOPY (EGD) [by pascal net and snare with profol sedation;  Surgeon: Feliciano Emmanuel MD;  Location:  GI     ESOPHAGOSCOPY, GASTROSCOPY, DUODENOSCOPY (EGD), COMBINED N/A 3/19/2018    Procedure: COMBINED ESOPHAGOSCOPY, GASTROSCOPY, DUODENOSCOPY (EGD), REMOVE FOREIGN BODY;   Esophagodscopy, Gastroscopy, Duodenoscopy,Foreign Body Removal;  Surgeon: Brice Guzmán MD;  [facility]     ESOPHAGOSCOPY, GASTROSCOPY, DUODENOSCOPY (EGD), COMBINED N/A 4/16/2018    Procedure: COMBINED ESOPHAGOSCOPY, GASTROSCOPY, DUODENOSCOPY (EGD), REMOVE FOREIGN BODY;  Esophagogastroduodenoscopy  Foreign Body Removal X 2;  Surgeon: Royer Moise MD;  [facility]     ESOPHAGOSCOPY, GASTROSCOPY, DUODENOSCOPY (EGD), COMBINED N/A 6/1/2018    Procedure: COMBINED ESOPHAGOSCOPY, GASTROSCOPY, DUODENOSCOPY (EGD), REMOVE FOREIGN BODY;  COMBINED ESOPHAGOSCOPY, GASTROSCOPY, DUODENOSCOPY with removal of foreign body, propofol sedation by anesthesia;  Surgeon: Brice Martinez MD;  Location:  GI     ESOPHAGOSCOPY, GASTROSCOPY, DUODENOSCOPY (EGD), COMBINED N/A 7/25/2018    Procedure: COMBINED ESOPHAGOSCOPY, GASTROSCOPY, DUODENOSCOPY (EGD), REMOVE FOREIGN BODY;;  Surgeon: Candy Castelan MD;  Location:  GI     ESOPHAGOSCOPY, GASTROSCOPY, DUODENOSCOPY (EGD), COMBINED N/A 7/28/2018    Procedure: COMBINED ESOPHAGOSCOPY, GASTROSCOPY, DUODENOSCOPY (EGD), REMOVE FOREIGN BODY;  COMBINED ESOPHAGOSCOPY, GASTROSCOPY, DUODENOSCOPY (EGD), REMOVE FOREIGN BODY;  Surgeon: Brice Guzmán MD;  [facility]     ESOPHAGOSCOPY, GASTROSCOPY, DUODENOSCOPY (EGD), COMBINED N/A 7/31/2018    Procedure: COMBINED ESOPHAGOSCOPY, GASTROSCOPY, DUODENOSCOPY (EGD);  COMBINED ESOPHAGOSCOPY, GASTROSCOPY, DUODENOSCOPY (EGD) TO REMOVE FOREIGN BODY;  Surgeon: Lokesh Paula MD;  [facility]     ESOPHAGOSCOPY, GASTROSCOPY, DUODENOSCOPY (EGD), COMBINED N/A 8/4/2018    Procedure: COMBINED  ESOPHAGOSCOPY, GASTROSCOPY, DUODENOSCOPY (EGD), REMOVE FOREIGN BODY;   combined esophagoscopy, gastroscopy, duodenoscopy, REMOVE FOREIGN BODY ;  Surgeon: Lokesh Paula MD;  [facility]     ESOPHAGOSCOPY, GASTROSCOPY, DUODENOSCOPY (EGD), COMBINED N/A 10/6/2019    Procedure: ESOPHAGOGASTRODUODENOSCOPY (EGD) with fireign body removal x2, esophageal stent placement with floroscopy;  Surgeon: Timoteo Espana MD;  [facility]     EXAM UNDER ANESTHESIA ANUS N/A 1/10/2017    Procedure: EXAM UNDER ANESTHESIA ANUS;  Surgeon: Annmarie Haynes MD;  [facility]     EXAM UNDER ANESTHESIA RECTUM N/A 7/19/2018    Procedure: EXAM UNDER ANESTHESIA RECTUM;  EXAM UNDER ANESTHESIA, REMOVAL OF RECTAL FOREIGN BODY;  Surgeon: Annmarie Haynes MD;  [facility]: [facility]     HC REMOVE FECAL IMPACTION OR FB W ANESTHESIA N/A 12/18/2016    Procedure: REMOVE FECAL IMPACTION/FOREIGN BODY UNDER ANESTHESIA;  Surgeon: Soham Cano MD;  Location:  OR     KNEE SURGERY - removed a small tissue mass from knee Right      LAPAROSCOPIC ABLATION ENDOMETRIOSIS       LAPAROTOMY EXPLORATORY N/A 1/10/2017    Procedure: LAPAROTOMY EXPLORATORY;  Surgeon: Annmarie Haynes MD;  [facility]     LAPAROTOMY EXPLORATORY  09/11/2019    Manual manipulation of bowel to remove pill bottle in rectum     lymph nodes removed from neck; benign  age 7     MAMMOPLASTY REDUCTION Bilateral      RELEASE CARPAL TUNNEL Bilateral      SIGMOIDOSCOPY FLEXIBLE N/A 1/10/2017    Procedure: SIGMOIDOSCOPY FLEXIBLE;  Surgeon: Annmarie Haynes MD;  [facility]: [facility]     SIGMOIDOSCOPY FLEXIBLE N/A 5/8/2018    Procedure: SIGMOIDOSCOPY FLEXIBLE;  flex sig with foreign body removal using snare and rattooth forcep;  Surgeon: Soham Cano MD;  Location:  GI     SIGMOIDOSCOPY FLEXIBLE N/A 2/20/2019    Procedure: Exam under anesthesia Colonoscopy with attempted  removal of rectal foreign body;  Surgeon: Estrada Chávez MD;  [facility]        Social History   I have reviewed this patient's social history and updated it with pertinent information if needed. Nevin Alvarado  reports that she has never smoked. She has never used smokeless tobacco. She reports that she does not drink alcohol or use drugs.    Family History       Prior to Admission Medications   Prior to Admission Medications   Prescriptions Last Dose Informant Patient Reported? Taking?   acetaminophen (TYLENOL) 500 MG tablet Past Week at Unknown time Self Yes Yes   Sig: Take 2 tablets (1,000 mg) by mouth every 6 hours as needed for pain or headache   albuterol (PROAIR HFA) 108 (90 Base) MCG/ACT inhaler Past Month at Unknown time Self Yes Yes   Sig: Inhale 2 puffs into the lungs 4 times daily as needed    busPIRone (BUSPAR) 10 MG tablet 10/15/2019 at am Self Yes Yes   Sig: Take 1 tablet (10 mg) by mouth twice daily   clindamycin (CLEOCIN) 75 MG/5ML solution 10/15/2019 at 0830 Self No Yes   Sig: Take 20 mLs (300 mg) by mouth every 6 hours for 7 days   cloNIDine (CATAPRES) 0.1 MG tablet 10/15/2019 at am Self Yes Yes   Sig: Take 0.1 mg by mouth 2 times daily    desvenlafaxine (PRISTIQ) 100 MG 24 hr tablet 10/15/2019 at am Self Yes Yes   Sig: Take 1 tablet (100 mg) by mouth every morning   diphenhydrAMINE (BENADRYL) 25 MG capsule Past Month at Unknown time Self Yes Yes   Sig: Take 1-2 capsules (25-50 mg) by mouth every 6 hours as needed for itching or sleep   docusate (COLACE) 50 MG/5ML liquid Not taking at d/t loose stools Self No No   Sig: Take 10 mLs (100 mg) by mouth 2 times daily for 15 days   docusate sodium (COLACE) 100 MG capsule not taking at not taking Self Yes No   Sig: Take 1 capsule (100 mg) by mouth twice daily as needed for constipation   fluconazole (DIFLUCAN) 40 MG/ML suspension 10/15/2019 at 0830 Self No Yes   Sig: Take 5 mLs (200 mg) by mouth daily for 7 days   gabapentin (NEURONTIN) 300 MG capsule 10/15/2019 at am Self Yes Yes   Sig: Take 2 capsules (600 mg)  by mouth three times daily   ibuprofen (ADVIL/MOTRIN) 100 MG/5ML suspension 10/15/2019 at am Self No Yes   Sig: Take 20 mLs (400 mg) by mouth every 6 hours as needed for moderate pain   levofloxacin (LEVAQUIN) 25 MG/ML solution 10/15/2019 at 0830 Self No Yes   Sig: Take 20 mLs (500 mg) by mouth daily for 7 days   lurasidone (LATUDA) 60 MG TABS tablet 10/11/2019 at hs Self Yes Yes   Sig: Take 1 tablet (60 mg) by mouth at bedtime   menthol (ICY HOT) 5 % PTCH not using at cannot reach affected area Self No No   Sig: Apply 1 patch topically every 8 hours as needed for muscle soreness   metFORMIN (GLUCOPHAGE-XR) 500 MG 24 hr tablet 10/11/2019 at am Self Yes Yes   Sig: Take 1 tablet (500 mg) by mouth daily   omeprazole (PRILOSEC) 2 mg/mL suspension 10/15/2019 at 0830 Self No Yes   Sig: Take 10 mLs (20 mg) by mouth every morning (before breakfast)   ondansetron (ZOFRAN-ODT) 8 MG ODT tab 10/15/2019 at 0830 Self No Yes   Sig: Take 1 tablet (8 mg) by mouth every 6 hours as needed for nausea or vomiting   oxyCODONE (ROXICODONE) 5 MG/5ML solution 10/15/2019 at 0830 Self No Yes   Sig: Take 5 mLs (5 mg) by mouth every 4 hours as needed for severe pain   topiramate (TOPAMAX) 100 MG tablet 10/11/2019 at hs Self Yes Yes   Sig: Take 1 tablet (100 mg) by mouth daily at bedtime   vitamin D3 (CHOLECALCIFEROL) 2000 units (50 mcg) tablet 10/15/2019 at 0800 Self Yes Yes   Sig: Take 1 tablet (2,000 units) by mouth daily      Facility-Administered Medications: None     Allergies   Allergies   Allergen Reactions     Amoxicillin-Pot Clavulanate Other (See Comments), Rash and Swelling     PN: facial swelling, left side. Also had cortisone injection the same day as she started the Augmentin.  PN: facial swelling, left side. Also had cortisone injection the same day as she started the Augmentin.  Noted in downtime recovery of chart.       Penicillins Anaphylaxis     Blood-Group Specific Substance Other (See Comments)     Patient has an anti-Cw  and non-specific antibodies. Blood product orders may be delayed. Draw one red top and two purple top tubes for all type/screen/crossmatch orders.     Hydrocodone Nausea and Vomiting     vomiting for days     Influenza Vaccines Other (See Comments)     Oseltamivir Hives     med stopped, PN: med stopped     Vancomycin Swelling     Vicodin [Hydrocodone-Acetaminophen] Nausea and Vomiting     Cephalosporins Rash     Lamotrigine Rash     Possibly associated with Lamictal.      Latex Rash       Physical Exam   Vital Signs: Temp: 97.9  F (36.6  C) Temp src: Oral BP: 124/87 Pulse: 94 Heart Rate: 89 Resp: 18 SpO2: 97 % O2 Device: None (Room air)    Weight: 259 lbs 8 oz     Constitutional: Oriented to person, place, and time. Appears well-developed and well-nourished.   HENT:   Head: Normocephalic and atraumatic.   Eyes: Conjunctivae are normal.  Neck: Normal range of motion.   Cardiovascular: Normal rate, regular rhythm, normal heart sounds and intact distal pulses.    Pulmonary/Chest: Effort normal and breath sounds normal. No respiratory distress. No wheezes.   Abdominal: Soft, obese.  A well-healing lower midline scar inferior to umbilicus. Exhibits no distension. There is no tenderness.   Musculoskeletal: Normal range of motion.   Neurological: Alert and oriented to person, place, and time.   Skin: Skin is warm and dry.   Psychiatric: Has a normal mood and affect. Behavior is normal.       Data   Data reviewed today: I reviewed all medications, new labs and imaging results over the last 24 hours. I personally reviewed the CT chest and abdomen as well as EKG.    Recent Labs   Lab 10/15/19  1242 10/12/19  1419 10/11/19  1102   WBC 9.9 7.9 8.6   HGB 12.9 11.0* 10.8*   MCV 88 93 93    222 238    144 141   POTASSIUM 3.5 3.5 3.1*   CHLORIDE 108 111* 112*   CO2 23 27 24   BUN 8 15 14   CR 0.56 0.76 0.58   ANIONGAP 7 6 6   KELBY 8.9 8.5 8.6   * 101* 141*   ALBUMIN 3.1*  --   --    PROTTOTAL 7.6  --   --     BILITOTAL 0.3  --   --    ALKPHOS 63  --   --    ALT 20  --   --    AST 14  --   --    LIPASE 146  --   --    TROPI <0.015  --   --      Recent Results (from the past 24 hour(s))   CT Chest Pulmonary Embolism w Contrast    Narrative    Exam: Chest CT Pulmonary Angiogram 10/15/2019.    History: Worsening left-sided chest pain and back pain after  esophageal perforation. Evaluate for pathology including PA.    Comparison: CT chest 10/12/2019.    Technique: Volumetric helical acquisition of the chest was obtained  following pulmonary embolism protocol.     Findings:    There is adequate opacification of the pulmonary arteries. No filling  defect to suggest pulmonary artery embolism. The aorta is normal in  caliber without evidence of dissection.     Redemonstration of esophageal stent with hyperdense material within  the mid and distal esophagus (series 5 image 24), which is presumably  part of the esophageal stent. Again noted is some mucus debris within  the esophagus. Right-sided Port-A-Cath with tip located in the high  right atrium. The heart is not enlarged. No pericardial effusion. No  mediastinal, hilar, or axillary lymphadenopathy. Redemonstration of  several foci of air inferior to the left main bronchus (series 5 image  29) is present    Mild left basilar atelectasis. Several sub-4 mm pulmonary nodules for  example series 6 image 38 in the right lower lobe. Trace left pleural  effusion. No pneumothorax.     Limited evaluation of the abdomen, no appreciable acute abdominal  findings.Please refer to CT abdomen and pelvis same day for detailed  report.    No suspicious bony lesions. Mild degenerative changes of the thoracic  spine.      Impression    Impression:     1. No evidence for pulmonary embolism or aortic pathology.  2. Similar appearance of the esophageal stent compared with CT  10/12/2019. There is similar foci of air under the left main bronchus.  No significant increase in the amount of air to  suggest perforation.  3. Trace left pleural effusion.     I have personally reviewed the examination and initial interpretation  and I agree with the findings.    RICHELLE JACKSON MD   CT Abdomen Pelvis w Contrast    Narrative    EXAMINATION: CT ABDOMEN PELVIS W CONTRAST, 10/15/2019 3:39 PM    TECHNIQUE:  Helical CT images from the lung bases through the  symphysis pubis were obtained with contrast.  Coronal and sagittal  reformatted images were generated at a workstation for further  assessment.    CONTRAST:  135 ml Isovue 370.    COMPARISON: None.    HISTORY: abdominal pain, hx of foreign body ingestions, lack of bowel  movement and flatus, eval for obstruction, etc    FINDINGS:    Lines and tubes: None.    Lung bases: No consolidation or pleural effusion. Mild subsegmental  bibasilar atelectasis. Partial visualization of esophageal stent.  There is some esophageal wall thickening along the towards the  gastroesophageal junction. Please see separately dictated CT chest  same date.    Abdomen and pelvis:    Rim-enhancing fluid collection in the subcutaneous midline lower  anterior abdominal wall measuring 2.3 x 3.1 cm (series 2, image 53),  no intra-abdominal extension. This is a contain any internal foci of  air. Fatty streakiness in the subcutaneous anterior abdominal wall    Liver: No suspicious liver lesions. Portal veins appear patent.   Gallbladder: No gallstones. No evidence of acute cholecystitis.  Spleen: Normal size.  Pancreas: No suspicious pancreatic lesions. The pancreatic duct is not  dilated.  Adrenal glands: No adrenal nodules.   Kidneys: No hydronephrosis or obstructing renal stones.    Bladder / Pelvic organs: Unremarkable. Intrauterine contraceptive  device in the uterus  Bowel: No bowel wall thickening. The appendix is unremarkable. No  abnormal small or large bowel loop dilatation  Lymph nodes: No retroperitoneal, mesenteric, or pelvic  lymphadenopathy.  Peritoneum / Retroperitoneum: No free  fluid or air within the abdomen.  Vessels: No infrarenal aortic aneurysm.     Bones and soft tissues: Degenerative changes of the spine. No  aggressive osseous lesion      Impression    IMPRESSION:   1. No evidence for  bowel obstruction.  2. Small collection in the subcutaneous midline lower anterior  abdominal wall measuring up to 3.1 cm, associated with fatty  streakiness. No intra-abdominal extension.  3. No unexpected radiopaque foreign body in the abdomen or pelvis  identified.  4. Stent in the esophagus partially visualized with some wall  thickening. Please see separate CT chest same date.    I have personally reviewed the examination and initial interpretation  and I agree with the findings.    ROLANDA MCCLAIN MD

## 2019-10-16 NOTE — PHARMACY-ADMISSION MEDICATION HISTORY
Admission medication history interview status for the 10/15/2019 admission is complete. See Epic admission navigator for allergy information, pharmacy, prior to admission medications and immunization status.     Medication history interview sources:  Chart Review (recent hospitalizations; Med History note from 10-07-19); Patient interview    Changes made to PTA medication list (reason)  Added: none  Deleted: none  Changed: updated date/time of most recent doses    Additional medication history information (including reliability of information, actions taken by pharmacist):  - Latuda (lurasidone) and Topamax (topiramate). Patient has not taken these two medications since Friday, 10/11/19.    - Zofran ODT 8 mg Q6H PRN. Patient reports she does not gain relief from this medication.  - Stool softener. Patient has not been taking docusate suspension or capsules d/t loose stools.  - Icy Hot Patch. Patient cannot reach affected area to place patch.    Prior to Admission medications    Medication Sig Last Dose Taking? Auth Provider   acetaminophen (TYLENOL) 500 MG tablet Take 2 tablets (1,000 mg) by mouth every 6 hours as needed for pain or headache Past Week at Unknown time Yes Unknown, Entered By History   albuterol (PROAIR HFA) 108 (90 Base) MCG/ACT inhaler Inhale 2 puffs into the lungs 4 times daily as needed  Past Month at Unknown time Yes Reported, Patient   busPIRone (BUSPAR) 10 MG tablet Take 1 tablet (10 mg) by mouth twice daily 10/15/2019 at am Yes Unknown, Entered By History   clindamycin (CLEOCIN) 75 MG/5ML solution Take 20 mLs (300 mg) by mouth every 6 hours for 7 days 10/15/2019 at 0830 Yes aJxon Flores PA-C   cloNIDine (CATAPRES) 0.1 MG tablet Take 0.1 mg by mouth 2 times daily  10/15/2019 at am Yes Reported, Patient   desvenlafaxine (PRISTIQ) 100 MG 24 hr tablet Take 1 tablet (100 mg) by mouth every morning 10/15/2019 at am Yes Reported, Patient   diphenhydrAMINE (BENADRYL) 25 MG capsule Take 1-2  capsules (25-50 mg) by mouth every 6 hours as needed for itching or sleep Past Month at Unknown time Yes Unknown, Entered By History   fluconazole (DIFLUCAN) 40 MG/ML suspension Take 5 mLs (200 mg) by mouth daily for 7 days 10/15/2019 at 0830 Yes Jaxon Flores PA-C   gabapentin (NEURONTIN) 300 MG capsule Take 2 capsules (600 mg) by mouth three times daily 10/15/2019 at am Yes Reported, Patient   ibuprofen (ADVIL/MOTRIN) 100 MG/5ML suspension Take 20 mLs (400 mg) by mouth every 6 hours as needed for moderate pain 10/15/2019 at am Yes Jaxon Flores PA-C   levofloxacin (LEVAQUIN) 25 MG/ML solution Take 20 mLs (500 mg) by mouth daily for 7 days 10/15/2019 at 0830 Yes Jaxon Flores PA-C   lurasidone (LATUDA) 60 MG TABS tablet Take 1 tablet (60 mg) by mouth at bedtime 10/11/2019 at hs Yes Reported, Patient   metFORMIN (GLUCOPHAGE-XR) 500 MG 24 hr tablet Take 1 tablet (500 mg) by mouth daily 10/11/2019 at am Yes Unknown, Entered By History   omeprazole (PRILOSEC) 2 mg/mL suspension Take 10 mLs (20 mg) by mouth every morning (before breakfast) 10/15/2019 at 0830 Yes Jaxon Flores PA-C   ondansetron (ZOFRAN-ODT) 8 MG ODT tab Take 1 tablet (8 mg) by mouth every 6 hours as needed for nausea or vomiting 10/15/2019 at 0830 Yes Jaxon Flores PA-C   oxyCODONE (ROXICODONE) 5 MG/5ML solution Take 5 mLs (5 mg) by mouth every 4 hours as needed for severe pain 10/15/2019 at 0830 Yes Jaxon Flores PA-C   topiramate (TOPAMAX) 100 MG tablet Take 1 tablet (100 mg) by mouth daily at bedtime 10/11/2019 at hs Yes Unknown, Entered By History   vitamin D3 (CHOLECALCIFEROL) 2000 units (50 mcg) tablet Take 1 tablet (2,000 units) by mouth daily 10/15/2019 at 0800 Yes Unknown, Entered By History   docusate (COLACE) 50 MG/5ML liquid Take 10 mLs (100 mg) by mouth 2 times daily for 15 days Not taking at d/t loose stools  Jaxon Flores PA-C   docusate sodium (COLACE) 100 MG capsule Take 1 capsule (100 mg) by  mouth twice daily as needed for constipation not taking at not taking  Unknown, Entered By History   menthol (ICY HOT) 5 % PTCH Apply 1 patch topically every 8 hours as needed for muscle soreness not using at cannot reach affected area  Jaxon Flores, REUBEN       Medication history completed by: Zay Burgess, PharmD candidate (2020)

## 2019-10-16 NOTE — PROGRESS NOTES
"Surgery Progress Note         Subjective:  NAEON. VSS. Not eating d/t pain. Describes pain as sensation of \"just there.\" Feels constipated, reports last BM was Friday (10/11). Nausea, no emesis. Treated with compazine. Chills overnight.     Objective:  Temp:  [97.2  F (36.2  C)-98.5  F (36.9  C)] 98.5  F (36.9  C)  Pulse:  [70-94] 94  Heart Rate:  [71-89] 71  Resp:  [16-20] 16  BP: (102-137)/() 118/66  SpO2:  [91 %-100 %] 96 %  No intake/output data recorded.    Physical exam:  Gen: Awake, alert, NAD   CV: RRR  Resp: non-labored at rest  Abd: Soft, obese, non-distended, NTTP  Skin: midline incision healing appropriately with granulation tissue and no erythema, no open sores, lesions or ulcerations    A/P: Nevin Alvarado is a 27 y/o female with complex psychosocial history, including foreign body ingestion and rectal insertion requiring procedural removal. She has a recent history of a midline laparotomy (09/11 to remove foreign body from colon / rectum) and an esophageal stent placement for esophageal perforation after ingesting a paperclip on 10/05. She was discharged from the hospital on 10/11 and has had difficulty maintaining her oral intake since then. She is found to have a seroma on CT obtained to evaluate complaints of decreased appetite and worsening left-sided chest and back pain after esophageal perforation.    - IV NS and D5 + KCl for hydration  - Bowel regimen -- abdominal discomfort is likely 2/2 constipation, recommend miralax / docusate / senna +/- pink lady enema.  - Small seroma will resolve without surgical intervention. The risks of opening the wound outweigh the benefits of draining a small, likely sterile fluid collection. She has no symptoms where the seroma is located, it is not tender to palpation, and there are no overlying skin changes. As such, surgery is not indicated for this patient.    Please call with questions or if new surgical concerns manifest.     Will discuss with " staff.    Jesus Herrera, MS3  [facility]    I, Jori Hernandez MD, have personally seen and examined the patient. I have read and edited the medical student's documentation where appropriate, and agree with the stated findings and plan.    Jori Hernandez MD, [facility]  Plastic Surgery PGY-1  Pager: [phone]

## 2019-10-16 NOTE — PLAN OF CARE
"[id number].  /76 (BP Location: Left arm)   Pulse 83   Temp 97.3  F (36.3  C) (Axillary)   Resp 16   Ht 1.575 m (5' 2\")   Wt 117.7 kg (259 lb 8 oz)   LMP 10/15/2017   SpO2 98%   BMI 47.46 kg/m     AVSS.   Alert and oriented x4.  Sitter at bed side. Stable.  S/p esophagram this afternoon/swallow evaluation.  No issues eating or drinking currently.  C/o back pain and was given tylenol & IV Toradol 15mg.  Currently resting in bed.  Continue with current cares.    "

## 2019-10-17 ENCOUNTER — PATIENT OUTREACH (OUTPATIENT)
Dept: CARE COORDINATION | Facility: CLINIC | Age: 29
End: 2019-10-17

## 2019-10-17 VITALS
WEIGHT: 263.7 LBS | TEMPERATURE: 97 F | DIASTOLIC BLOOD PRESSURE: 71 MMHG | HEART RATE: 75 BPM | RESPIRATION RATE: 16 BRPM | OXYGEN SATURATION: 98 % | BODY MASS INDEX: 48.53 KG/M2 | HEIGHT: 62 IN | SYSTOLIC BLOOD PRESSURE: 122 MMHG

## 2019-10-17 PROBLEM — K59.09 OTHER CONSTIPATION: Status: ACTIVE | Noted: 2019-10-17

## 2019-10-17 PROCEDURE — 25000128 H RX IP 250 OP 636: Performed by: STUDENT IN AN ORGANIZED HEALTH CARE EDUCATION/TRAINING PROGRAM

## 2019-10-17 PROCEDURE — 25000132 ZZH RX MED GY IP 250 OP 250 PS 637: Performed by: STUDENT IN AN ORGANIZED HEALTH CARE EDUCATION/TRAINING PROGRAM

## 2019-10-17 PROCEDURE — 25000132 ZZH RX MED GY IP 250 OP 250 PS 637: Performed by: FAMILY MEDICINE

## 2019-10-17 RX ORDER — POLYETHYLENE GLYCOL 3350 17 G/17G
17 POWDER, FOR SOLUTION ORAL 2 TIMES DAILY
Qty: 14 PACKET | Refills: 0 | Status: ON HOLD | OUTPATIENT
Start: 2019-10-17 | End: 2019-11-04

## 2019-10-17 RX ORDER — POLYETHYLENE GLYCOL 3350 17 G/17G
17 POWDER, FOR SOLUTION ORAL 2 TIMES DAILY
Qty: 14 PACKET | Refills: 0 | Status: SHIPPED | OUTPATIENT
Start: 2019-10-17 | End: 2019-10-17

## 2019-10-17 RX ADMIN — LEVOFLOXACIN 500 MG: 25 SOLUTION ORAL at 07:59

## 2019-10-17 RX ADMIN — FLUCONAZOLE 200 MG: 40 POWDER, FOR SUSPENSION ORAL at 08:00

## 2019-10-17 RX ADMIN — BUSPIRONE HYDROCHLORIDE 10 MG: 5 TABLET ORAL at 08:00

## 2019-10-17 RX ADMIN — OXYCODONE HYDROCHLORIDE 5 MG: 5 SOLUTION ORAL at 09:22

## 2019-10-17 RX ADMIN — METFORMIN HYDROCHLORIDE 500 MG: 500 TABLET, EXTENDED RELEASE ORAL at 07:59

## 2019-10-17 RX ADMIN — CLONIDINE HYDROCHLORIDE 0.1 MG: 0.1 TABLET ORAL at 08:01

## 2019-10-17 RX ADMIN — Medication 5 ML: at 14:05

## 2019-10-17 RX ADMIN — KETOROLAC TROMETHAMINE 15 MG: 15 INJECTION, SOLUTION INTRAMUSCULAR; INTRAVENOUS at 07:57

## 2019-10-17 RX ADMIN — GABAPENTIN 600 MG: 300 CAPSULE ORAL at 08:01

## 2019-10-17 RX ADMIN — DESVENLAFAXINE 100 MG: 50 TABLET, FILM COATED, EXTENDED RELEASE ORAL at 08:00

## 2019-10-17 RX ADMIN — KETOROLAC TROMETHAMINE 15 MG: 15 INJECTION, SOLUTION INTRAMUSCULAR; INTRAVENOUS at 14:05

## 2019-10-17 RX ADMIN — Medication 5 ML: at 08:17

## 2019-10-17 RX ADMIN — ACETAMINOPHEN 650 MG: 325 TABLET, FILM COATED ORAL at 12:51

## 2019-10-17 RX ADMIN — OMEPRAZOLE 20 MG: KIT at 08:00

## 2019-10-17 RX ADMIN — CLINDAMYCIN PALMITATE HYDROCHLORIDE 300 MG: 75 SOLUTION ORAL at 03:51

## 2019-10-17 RX ADMIN — CLINDAMYCIN PALMITATE HYDROCHLORIDE 300 MG: 75 SOLUTION ORAL at 09:22

## 2019-10-17 ASSESSMENT — ACTIVITIES OF DAILY LIVING (ADL)
ADLS_ACUITY_SCORE: 9

## 2019-10-17 NOTE — DISCHARGE SUMMARY
[facility] Discharge Summary- [facility]  [facility]    Date of Admission:  10/15/2019  Date of Service: 10/17/2019  Date of Discharge:  10/17/2019  Discharging Attending Provider: Dr. Ramsey  Discharge Team: [facility]    Discharge Diagnoses   Abdominal pain  Constipation  Compulsive foreign object ingestion, with recent esophageal perforation s/p esophageal stent placement 10/9/2019    Follow-ups Needed After Discharge   PCP to follow-up within 7 days of discharge.    Hospital Course   Nevin Alvarado was admitted on 10/15/2019 for abdominal pain. She had underwent a recent esophageal stent placement on 10/9/2019 after recent esophageal perforation due to foreign body ingestion. Since this she has been having ongoing abdominal pain. CTAP on 10/15 was reassuring, and case discussed with thoracic surgery. The pain was thought 2/2 to constipation. Over her hospital stay, she was able to advance to a regular diet, with good stool outputs with improvements in her pain.    The following problems were addressed during her hospitalization:    # Abd/back pain after recent esophageal stent placement  - Continue colace for bowel regimen, miralax BID  - PTA oxycodone 5 mg every 4 hours as needed,  verified starting 10/11/2019. Will try to wean while here given constipation.  - Finish prior course of clinda 300 QID, levaquin 500 mg every day, diflucan 200 mg every day to end 10/18/2019 (total 7 day course)  - PTA prilosec 20 mg daily.     # Complex psychosocial history  # Depression and anxiety  # Compulsive foreign object ingestion, rectal insertion  Previously seen by psychiatry during her last hospital admission recommended DBT as an outpatient with follow-up closely with her primary psychiatrist.  There is no indication for inpatient psychiatric admission.  - Continue home dose of buspirone 10 BID, clonidine 0.1 BID, desvenlafaxine 100 qd, and topiramate 100 qhs,  gabapentin 600 TID     # Neuropathy  Receiving Gamunex C for this - per patient q2week, next due 10/21.  R chest wall portacath in place since June.    # Discharge Pain Plan:   - During her hospitalization, Nevin experienced pain due to abdominal pain.  The pain plan for discharge was discussed with Nevin and the plan was created in a collaborative fashion.    - Plan per above    Consultations This Hospital Stay   MEDICATION HISTORY IP PHARMACY CONSULT    Code Status   Full Code       The patient was discussed with Dr. R[facility] Inpatient [facility]  [facility]   Pager:[phone]_  ___________________________________________________________________  Review of Systems:  CONSTITUTIONAL: NEGATIVE for fever, chills, change in weight  ENT/MOUTH: NEGATIVE for ear, mouth and throat problems  RESP: NEGATIVE for significant cough or SOB  CV: NEGATIVE for chest pain, palpitations or peripheral edema  GI: NEGATIVE for nausea, heartburn, or change in bowel habits. Abdominal pain improved.  : NEGATIVE for frequency, dysuria, or hematuria  MUSCULOSKELETAL: NEGATIVE for significant arthralgias or myalgia  NEURO: NEGATIVE for weakness, dizziness or paresthesias  HEME/ALLERGY: NEGATIVE for bleeding problems  Physical Exam   Vital Signs: Temp: 97  F (36.1  C) Temp src: Oral BP: 122/71 Pulse: 75   Resp: 16 SpO2: 98 % O2 Device: None (Room air)    Weight: 263 lbs 11.2 oz    General Appearance: Alert, flat affect, NAD  Respiratory: Clear to auscultation  Cardiovascular: S1/S2 normal, RRR, no murmurs  GI: soft, non-tender. BS present  Skin: wnl    Significant Results and Procedures   Results for orders placed or performed during the hospital encounter of 10/15/19   CT Chest Pulmonary Embolism w Contrast    Narrative    Exam: Chest CT Pulmonary Angiogram 10/15/2019.    History: Worsening left-sided chest pain and back pain after  esophageal perforation. Evaluate for pathology  including PA.    Comparison: CT chest 10/12/2019.    Technique: Volumetric helical acquisition of the chest was obtained  following pulmonary embolism protocol.     Findings:    There is adequate opacification of the pulmonary arteries. No filling  defect to suggest pulmonary artery embolism. The aorta is normal in  caliber without evidence of dissection.     Redemonstration of esophageal stent with hyperdense material within  the mid and distal esophagus (series 5 image 24), which is presumably  part of the esophageal stent. Again noted is some mucus debris within  the esophagus. Right-sided Port-A-Cath with tip located in the high  right atrium. The heart is not enlarged. No pericardial effusion. No  mediastinal, hilar, or axillary lymphadenopathy. Redemonstration of  several foci of air inferior to the left main bronchus (series 5 image  29) is present    Mild left basilar atelectasis. Several sub-4 mm pulmonary nodules for  example series 6 image 38 in the right lower lobe. Trace left pleural  effusion. No pneumothorax.     Limited evaluation of the abdomen, no appreciable acute abdominal  findings.Please refer to CT abdomen and pelvis same day for detailed  report.    No suspicious bony lesions. Mild degenerative changes of the thoracic  spine.      Impression    Impression:     1. No evidence for pulmonary embolism or aortic pathology.  2. Similar appearance of the esophageal stent compared with CT  10/12/2019. There is similar foci of air under the left main bronchus.  No significant increase in the amount of air to suggest perforation.  3. Trace left pleural effusion.     I have personally reviewed the examination and initial interpretation  and I agree with the findings.    RICHELLE JACKSON MD   CT Abdomen Pelvis w Contrast    Narrative    EXAMINATION: CT ABDOMEN PELVIS W CONTRAST, 10/15/2019 3:39 PM    TECHNIQUE:  Helical CT images from the lung bases through the  symphysis pubis were obtained with contrast.   Coronal and sagittal  reformatted images were generated at a workstation for further  assessment.    CONTRAST:  135 ml Isovue 370.    COMPARISON: None.    HISTORY: abdominal pain, hx of foreign body ingestions, lack of bowel  movement and flatus, eval for obstruction, etc    FINDINGS:    Lines and tubes: None.    Lung bases: No consolidation or pleural effusion. Mild subsegmental  bibasilar atelectasis. Partial visualization of esophageal stent.  There is some esophageal wall thickening along the towards the  gastroesophageal junction. Please see separately dictated CT chest  same date.    Abdomen and pelvis:    Rim-enhancing fluid collection in the subcutaneous midline lower  anterior abdominal wall measuring 2.3 x 3.1 cm (series 2, image 53),  no intra-abdominal extension. This is a contain any internal foci of  air. Fatty streakiness in the subcutaneous anterior abdominal wall    Liver: No suspicious liver lesions. Portal veins appear patent.   Gallbladder: No gallstones. No evidence of acute cholecystitis.  Spleen: Normal size.  Pancreas: No suspicious pancreatic lesions. The pancreatic duct is not  dilated.  Adrenal glands: No adrenal nodules.   Kidneys: No hydronephrosis or obstructing renal stones.    Bladder / Pelvic organs: Unremarkable. Intrauterine contraceptive  device in the uterus  Bowel: No bowel wall thickening. The appendix is unremarkable. No  abnormal small or large bowel loop dilatation  Lymph nodes: No retroperitoneal, mesenteric, or pelvic  lymphadenopathy.  Peritoneum / Retroperitoneum: No free fluid or air within the abdomen.  Vessels: No infrarenal aortic aneurysm.     Bones and soft tissues: Degenerative changes of the spine. No  aggressive osseous lesion      Impression    IMPRESSION:   1. No evidence for  bowel obstruction.  2. Small collection in the subcutaneous midline lower anterior  abdominal wall measuring up to 3.1 cm, associated with fatty  streakiness. No intra-abdominal  extension.  3. No unexpected radiopaque foreign body in the abdomen or pelvis  identified.  4. Stent in the esophagus partially visualized with some wall  thickening. Please see separate CT chest same date.    I have personally reviewed the examination and initial interpretation  and I agree with the findings.    ROLANDA MCCLAIN MD   XR Video Swallow w Esophagram    Narrative    Examination:  XR VIDEO SWALLOW W ESOPHAGRAM 10/16/2019 3:12 PM     Comparison: 10/10/2019.    History: S/p esophageal stent placement    Technique:   Contrast: The patient was fed barium in the following manner and  consistencies: Thin liquid, pudding, and barium coated cookie.  Patient position: Lateral view slightly recumbent from the upright  sitting position.  A limited esophagram was then performed with water-soluble contrast in  the semiupright LPO and RPO positions.  Fluoroscopy time: 1.5 minutes.    Findings:     Modified barium swallow:  The oral preparatory and oral phase of swallowing were normal. There  was normal initiation of swallowing. There was normal palatal  elevation and epiglottic deflection. No penetration or tracheal  aspiration upon ingestion of thin liquid, pudding, barium coated the  consistencies. There was no residual contrast in the oral  cavity/pharynx.    Esophagram:  The esophageal stent is again visualized extending from the mid third  to the distal third of the esophagus. Contrast easily transits through  the stent without evidence for extravasation or stenosis. There is  mild stasis of contrast distal to the stent, just proximal to the  gastroesophageal junction, however this clears in an appropriate  amount of time. The esophagus proximal and distal to the stent again  demonstrates normal mucosa and distensibility. No evidence for  esophageal reflux on the current study. Right portacatheter visualized  with postsurgical clips overlying the upper esophagus.        Impression    Impression:   1. Limited  esophagram demonstrates patent esophageal stent without  contrast extravasation. Transient delay of contrast passage at the  distal end of the stent.  2. No evidence for penetration or tracheal aspiration upon ingestion  of thin liquid, pudding, or barium coated cookie.    Please see the speech pathologist report for further details.    I have personally reviewed the examination and initial interpretation  and I agree with the findings.    PHU VYAS MD       Pending Results   These results will be followed up by N/A  Unresulted Labs Ordered in the Past 30 Days of this Admission     No orders found from 9/15/2019 to 10/16/2019.             Primary Care Physician   Latonya Knight    Discharge Disposition   Discharged to home  Condition at discharge: Stable    Discharge Orders      Home care nursing referral      Reason for your hospital stay    You were seen for abdominal pain     Adult [facility] Follow-up and recommended labs and tests    Follow up with primary care provider, Latonya Knight, within 7 days for hospital follow- up.        Appointments on [facility] and/or [facility] (with [facility] or [facility] provider or service). Call [phone] if you haven't heard regarding these appointments within 7 days of discharge.     Activity    Your activity upon discharge: activity as tolerated     Full Code     Diet    Follow this diet upon discharge: Orders Placed This Encounter      Regular Diet Adult     Discharge Medications   Current Discharge Medication List      START taking these medications    Details   polyethylene glycol (MIRALAX/GLYCOLAX) packet Take 17 g by mouth 2 times daily  Qty: 14 packet, Refills: 0    Associated Diagnoses: Other constipation         CONTINUE these medications which have NOT CHANGED    Details   acetaminophen (TYLENOL) 500 MG tablet Take 2 tablets (1,000 mg) by mouth every 6 hours as needed for pain or headache      albuterol (PROAIR HFA) 108 (90 Base) MCG/ACT inhaler Inhale 2  puffs into the lungs 4 times daily as needed       busPIRone (BUSPAR) 10 MG tablet Take 1 tablet (10 mg) by mouth twice daily      clindamycin (CLEOCIN) 75 MG/5ML solution Take 20 mLs (300 mg) by mouth every 6 hours for 7 days  Qty: 560 mL, Refills: 0    Associated Diagnoses: Swallowed foreign body, initial encounter      cloNIDine (CATAPRES) 0.1 MG tablet Take 0.1 mg by mouth 2 times daily       desvenlafaxine (PRISTIQ) 100 MG 24 hr tablet Take 1 tablet (100 mg) by mouth every morning      diphenhydrAMINE (BENADRYL) 25 MG capsule Take 1-2 capsules (25-50 mg) by mouth every 6 hours as needed for itching or sleep      fluconazole (DIFLUCAN) 40 MG/ML suspension Take 5 mLs (200 mg) by mouth daily for 7 days  Qty: 35 mL, Refills: 0    Associated Diagnoses: Swallowed foreign body, initial encounter      gabapentin (NEURONTIN) 300 MG capsule Take 2 capsules (600 mg) by mouth three times daily      ibuprofen (ADVIL/MOTRIN) 100 MG/5ML suspension Take 20 mLs (400 mg) by mouth every 6 hours as needed for moderate pain  Qty: 473 mL, Refills: 1    Associated Diagnoses: Swallowed foreign body, initial encounter      levofloxacin (LEVAQUIN) 25 MG/ML solution Take 20 mLs (500 mg) by mouth daily for 7 days  Qty: 140 mL, Refills: 0    Associated Diagnoses: Swallowed foreign body, initial encounter      lurasidone (LATUDA) 60 MG TABS tablet Take 1 tablet (60 mg) by mouth at bedtime      metFORMIN (GLUCOPHAGE-XR) 500 MG 24 hr tablet Take 1 tablet (500 mg) by mouth daily      omeprazole (PRILOSEC) 2 mg/mL suspension Take 10 mLs (20 mg) by mouth every morning (before breakfast)  Qty: 300 mL, Refills: 0    Associated Diagnoses: Swallowed foreign body, initial encounter      ondansetron (ZOFRAN-ODT) 8 MG ODT tab Take 1 tablet (8 mg) by mouth every 6 hours as needed for nausea or vomiting  Qty: 20 tablet, Refills: 1    Associated Diagnoses: Swallowed foreign body, initial encounter      oxyCODONE (ROXICODONE) 5 MG/5ML solution Take 5 mLs  (5 mg) by mouth every 4 hours as needed for severe pain  Qty: 200 mL, Refills: 0    Associated Diagnoses: Swallowed foreign body, initial encounter      topiramate (TOPAMAX) 100 MG tablet Take 1 tablet (100 mg) by mouth daily at bedtime      vitamin D3 (CHOLECALCIFEROL) 2000 units (50 mcg) tablet Take 1 tablet (2,000 units) by mouth daily      docusate (COLACE) 50 MG/5ML liquid Take 10 mLs (100 mg) by mouth 2 times daily for 15 days  Qty: 300 mL, Refills: 0    Associated Diagnoses: Swallowed foreign body, initial encounter      docusate sodium (COLACE) 100 MG capsule Take 1 capsule (100 mg) by mouth twice daily as needed for constipation      menthol (ICY HOT) 5 % PTCH Apply 1 patch topically every 8 hours as needed for muscle soreness  Qty: 10 each, Refills: 1    Associated Diagnoses: Swallowed foreign body, initial encounter           Allergies   Allergies   Allergen Reactions     Amoxicillin-Pot Clavulanate Other (See Comments), Rash and Swelling     PN: facial swelling, left side. Also had cortisone injection the same day as she started the Augmentin.  PN: facial swelling, left side. Also had cortisone injection the same day as she started the Augmentin.  Noted in downtime recovery of chart.       Penicillins Anaphylaxis     Blood-Group Specific Substance Other (See Comments)     Patient has an anti-Cw and non-specific antibodies. Blood product orders may be delayed. Draw one red top and two purple top tubes for all type/screen/crossmatch orders.     Hydrocodone Nausea and Vomiting     vomiting for days     Influenza Vaccines Other (See Comments)     Oseltamivir Hives     med stopped, PN: med stopped     Vancomycin Swelling     Vicodin [Hydrocodone-Acetaminophen] Nausea and Vomiting     Cephalosporins Rash     Lamotrigine Rash     Possibly associated with Lamictal.      Latex Rash

## 2019-10-17 NOTE — PLAN OF CARE
"/71 (BP Location: Left arm)   Pulse 75   Temp 97  F (36.1  C) (Oral)   Resp 16   Ht 1.575 m (5' 2\")   Wt 119.6 kg (263 lb 11.2 oz)   LMP 10/15/2017   SpO2 98%   BMI 48.23 kg/m      Neuro: A&O x4. Bedside attendant present.   Cardiac: WNL.   Respiratory: sats 98% on RA.   GI/: +hyperactive BS, not passing flatus, pt reports last BM was this AM, watery. Voiding spontaneously.   Diet: advanced to regular. C/O throat pain when swallowing, team paged. Advised patient to eat foods that are softer.   Lines: Right chest port- hep locked and de-accessed.   Pain/Nausea: PRN toradol given X2, oxycodone, and tylenol given x1.  Activity: Up independently.    Plan: will discharge this afternoon around 3p. Mother will transport.     Pt discharging. Discharge paperwork was reviewed with patient. Pt expressed understanding. A copy was given to patient. Pt discharging home. Mother will transport. Nursing [facility].  Discharge medications were sent to: [facility] pharmacy in [place].  All patient belongings were taken with patient.      "

## 2019-10-17 NOTE — PROGRESS NOTES
Care Coordinator Progress Note    Admission Date/Time:  10/15/2019  Attending MD:  John Ramsey MD    Data  Chart reviewed, discussed with interdisciplinary team.   Patient was admitted for: Other constipation.    Concerns with insurance coverage for discharge needs: None.  Current Living Situation: Patient lives alone.  Support System: Supportive and Involved  Services Involved: Home Care  Transportation at Discharge: Family or friend will provide  Transportation to Medical Appointments:  - Name of caregiver: self  Barriers to Discharge: none      Coordination of Care  D: Plan of care discussed with Medical Team at Interdisciplinary Rounds, plan for patient to discharge today.     I/A: Chart reviewed; met with patient at bedside to confirm home support, living arrangements and transportation. Resumption orders for home care completed. Called [facility] and notified CARMEN Elizabeth of today's discharge. No RNCC discharge needs identified.     P: Care Coordinator will remain available for discharge needs that may arise.      Resumption     [facility]  P: [phone]  F: [phone]    Resumption of Home Care Services     RN skilled nursing visit.   RN to assess vital signs and weight, respiratory and cardiac status, pain level and activity tolerance, hydration, nutrition and bowel status   RN to complete home safety evaluation.  RN to complete weekly medication management and set-up       Plan  Anticipated Discharge Date:  10/17/19  Anticipated Discharge Plan:  Home      Pamela Cagle RN, BSN, PHN  Internal Medicine Care Coordinator  [facility]  Desk Phone: [phone]  Pager: [phone]    To contact Weekend RNCC, dial [phone] and enter job code [phone] at prompt.   This pager can not be contacted by text page or outside line.

## 2019-10-17 NOTE — PLAN OF CARE
Vital signs:  Temp: 96.3  F (35.7  C) Temp src: Oral BP: 124/73 Pulse: 88 Resp: 16 SpO2: 98 % O2 Device: None (Room air)     Neuro: A&Ox4, cooperative, anxious at times.  Cardiac: WDL, denies chest pain.   Respiratory: LS clear, diminished, denies SOB.   Pain: Pt reported pain at left shoulder & spine, PRN Oxycodone, Tylenol & Tramadol with relief.   Diet: Tolerating diet, no nausea.   GI/: Voiding not saving. Abdomen rounded, +BS, +flatus, +loose stool.   LDA: port heparin locked.   Activity: Ambulating independently, sitter present.   New changes this shift: None, pt slept well overnight, no acute changes this shift.   Plan: Attendant at bedside. Continue POC.

## 2019-10-18 ENCOUNTER — TELEPHONE (OUTPATIENT)
Dept: SURGERY | Facility: CLINIC | Age: 29
End: 2019-10-18

## 2019-10-18 ENCOUNTER — PREP FOR PROCEDURE (OUTPATIENT)
Dept: SURGERY | Facility: CLINIC | Age: 29
End: 2019-10-18

## 2019-10-18 DIAGNOSIS — K22.3 ESOPHAGEAL PERFORATION: Primary | ICD-10-CM

## 2019-10-18 NOTE — TELEPHONE ENCOUNTER
ONCOLOGY INTAKE: Records Information      APPT INFORMATION:  Referring provider:  Dr. OMAIRA Espana  Referring provider s clinic:  [facility]  Reason for visit/diagnosis:  foreign body removal, post surgical stent removal and replacement  Has patient been notified of appointment date and time?: yes    RECORDS INFORMATION:  Were the records received with the referral (via [employer])? no    Has patient been seen for any external appt for this diagnosis? Patient's records are in care everywhere

## 2019-10-18 NOTE — TELEPHONE ENCOUNTER
Hello,  Patient called to schedule post surgery follow up appointment for stent removal/replacement.    I did schedule an appointment on 10/29/19 at the [facility]    Patient stated she is expecting the removal and replacement of her stent, and isn't sure if she needs a consult as she is expecting another procedure.    Please let me know if the patient should have this consult cancelled.    Thank you,  Savi

## 2019-10-24 ENCOUNTER — MYC MEDICAL ADVICE (OUTPATIENT)
Dept: SURGERY | Facility: CLINIC | Age: 29
End: 2019-10-24

## 2019-10-24 ENCOUNTER — TELEPHONE (OUTPATIENT)
Dept: SURGERY | Facility: CLINIC | Age: 29
End: 2019-10-24

## 2019-10-24 ENCOUNTER — HOSPITAL ENCOUNTER (OUTPATIENT)
Facility: CLINIC | Age: 29
End: 2019-10-24
Attending: THORACIC SURGERY (CARDIOTHORACIC VASCULAR SURGERY) | Admitting: THORACIC SURGERY (CARDIOTHORACIC VASCULAR SURGERY)
Payer: COMMERCIAL

## 2019-10-24 DIAGNOSIS — K22.3 ESOPHAGEAL PERFORATION: ICD-10-CM

## 2019-10-24 NOTE — TELEPHONE ENCOUNTER
Spoke with patient to schedule procedure with Dr. Timoteo Espana    Procedure was scheduled on 11/05 at [facility]  Patient will have H&P with Dr. Espana will update DOS if needed  Patient is aware a / is needed day of surgery.   Surgery letter was sent via [employer], patient has my direct contact information for any further questions.

## 2019-10-25 ENCOUNTER — HOSPITAL ENCOUNTER (EMERGENCY)
Facility: CLINIC | Age: 29
Discharge: HOME | End: 2019-10-25
Attending: EMERGENCY MEDICINE | Admitting: EMERGENCY MEDICINE
Payer: COMMERCIAL

## 2019-10-25 ENCOUNTER — APPOINTMENT (OUTPATIENT)
Dept: GENERAL RADIOLOGY | Facility: CLINIC | Age: 29
End: 2019-10-25
Attending: EMERGENCY MEDICINE
Payer: COMMERCIAL

## 2019-10-25 VITALS
SYSTOLIC BLOOD PRESSURE: 123 MMHG | BODY MASS INDEX: 48.03 KG/M2 | WEIGHT: 261 LBS | RESPIRATION RATE: 20 BRPM | HEIGHT: 62 IN | DIASTOLIC BLOOD PRESSURE: 88 MMHG | HEART RATE: 95 BPM | TEMPERATURE: 98.3 F | OXYGEN SATURATION: 94 %

## 2019-10-25 DIAGNOSIS — R07.89 CHEST WALL PAIN: ICD-10-CM

## 2019-10-25 LAB
ALBUMIN SERPL-MCNC: 3.4 G/DL (ref 3.4–5)
ALBUMIN UR-MCNC: NEGATIVE MG/DL
ALP SERPL-CCNC: 74 U/L (ref 40–150)
ALT SERPL W P-5'-P-CCNC: 27 U/L (ref 0–50)
ANION GAP SERPL CALCULATED.3IONS-SCNC: 6 MMOL/L (ref 3–14)
APPEARANCE UR: CLEAR
AST SERPL W P-5'-P-CCNC: 18 U/L (ref 0–45)
BASOPHILS # BLD AUTO: 0.1 10E9/L (ref 0–0.2)
BASOPHILS NFR BLD AUTO: 0.8 %
BILIRUB SERPL-MCNC: 0.3 MG/DL (ref 0.2–1.3)
BILIRUB UR QL STRIP: NEGATIVE
BUN SERPL-MCNC: 9 MG/DL (ref 7–30)
CALCIUM SERPL-MCNC: 9.2 MG/DL (ref 8.5–10.1)
CHLORIDE SERPL-SCNC: 107 MMOL/L (ref 94–109)
CO2 SERPL-SCNC: 25 MMOL/L (ref 20–32)
COLOR UR AUTO: YELLOW
CREAT SERPL-MCNC: 0.59 MG/DL (ref 0.52–1.04)
DIFFERENTIAL METHOD BLD: ABNORMAL
EOSINOPHIL # BLD AUTO: 0.4 10E9/L (ref 0–0.7)
EOSINOPHIL NFR BLD AUTO: 4.1 %
ERYTHROCYTE [DISTWIDTH] IN BLOOD BY AUTOMATED COUNT: 14.5 % (ref 10–15)
GFR SERPL CREATININE-BSD FRML MDRD: >90 ML/MIN/{1.73_M2}
GLUCOSE SERPL-MCNC: 102 MG/DL (ref 70–99)
GLUCOSE UR STRIP-MCNC: NEGATIVE MG/DL
HCT VFR BLD AUTO: 37.8 % (ref 35–47)
HGB BLD-MCNC: 11.8 G/DL (ref 11.7–15.7)
HGB UR QL STRIP: NEGATIVE
IMM GRANULOCYTES # BLD: 0 10E9/L (ref 0–0.4)
IMM GRANULOCYTES NFR BLD: 0.1 %
INTERPRETATION ECG - MUSE: NORMAL
KETONES UR STRIP-MCNC: NEGATIVE MG/DL
LEUKOCYTE ESTERASE UR QL STRIP: NEGATIVE
LYMPHOCYTES # BLD AUTO: 1.8 10E9/L (ref 0.8–5.3)
LYMPHOCYTES NFR BLD AUTO: 21.5 %
MCH RBC QN AUTO: 28.3 PG (ref 26.5–33)
MCHC RBC AUTO-ENTMCNC: 31.2 G/DL (ref 31.5–36.5)
MCV RBC AUTO: 91 FL (ref 78–100)
MONOCYTES # BLD AUTO: 0.6 10E9/L (ref 0–1.3)
MONOCYTES NFR BLD AUTO: 7.3 %
NEUTROPHILS # BLD AUTO: 5.6 10E9/L (ref 1.6–8.3)
NEUTROPHILS NFR BLD AUTO: 66.2 %
NITRATE UR QL: NEGATIVE
NRBC # BLD AUTO: 0 10*3/UL
NRBC BLD AUTO-RTO: 0 /100
PH UR STRIP: 6.5 PH (ref 5–7)
PLATELET # BLD AUTO: 316 10E9/L (ref 150–450)
POTASSIUM SERPL-SCNC: 3.7 MMOL/L (ref 3.4–5.3)
PROT SERPL-MCNC: 7.7 G/DL (ref 6.8–8.8)
RBC # BLD AUTO: 4.17 10E12/L (ref 3.8–5.2)
SODIUM SERPL-SCNC: 139 MMOL/L (ref 133–144)
SOURCE: NORMAL
SP GR UR STRIP: 1.01 (ref 1–1.03)
TROPONIN I SERPL-MCNC: <0.015 UG/L (ref 0–0.04)
UROBILINOGEN UR STRIP-MCNC: NORMAL MG/DL (ref 0–2)
WBC # BLD AUTO: 8.5 10E9/L (ref 4–11)

## 2019-10-25 PROCEDURE — 80053 COMPREHEN METABOLIC PANEL: CPT | Performed by: EMERGENCY MEDICINE

## 2019-10-25 PROCEDURE — 99285 EMERGENCY DEPT VISIT HI MDM: CPT | Mod: 25 | Performed by: EMERGENCY MEDICINE

## 2019-10-25 PROCEDURE — 84484 ASSAY OF TROPONIN QUANT: CPT | Performed by: EMERGENCY MEDICINE

## 2019-10-25 PROCEDURE — 85025 COMPLETE CBC W/AUTO DIFF WBC: CPT | Performed by: EMERGENCY MEDICINE

## 2019-10-25 PROCEDURE — 25000132 ZZH RX MED GY IP 250 OP 250 PS 637: Performed by: EMERGENCY MEDICINE

## 2019-10-25 PROCEDURE — 99284 EMERGENCY DEPT VISIT MOD MDM: CPT | Mod: 25 | Performed by: EMERGENCY MEDICINE

## 2019-10-25 PROCEDURE — 93010 ELECTROCARDIOGRAM REPORT: CPT | Mod: Z6 | Performed by: EMERGENCY MEDICINE

## 2019-10-25 PROCEDURE — 81003 URINALYSIS AUTO W/O SCOPE: CPT | Performed by: EMERGENCY MEDICINE

## 2019-10-25 PROCEDURE — 71046 X-RAY EXAM CHEST 2 VIEWS: CPT

## 2019-10-25 PROCEDURE — 93005 ELECTROCARDIOGRAM TRACING: CPT | Performed by: EMERGENCY MEDICINE

## 2019-10-25 RX ORDER — NAPROXEN 500 MG/1
500 TABLET ORAL ONCE
Status: COMPLETED | OUTPATIENT
Start: 2019-10-25 | End: 2019-10-25

## 2019-10-25 RX ADMIN — NAPROXEN 500 MG: 500 TABLET ORAL at 01:15

## 2019-10-25 ASSESSMENT — MIFFLIN-ST. JEOR: SCORE: 1872.14

## 2019-10-25 ASSESSMENT — ENCOUNTER SYMPTOMS
VOMITING: 0
SHORTNESS OF BREATH: 1
ABDOMINAL PAIN: 1

## 2019-10-25 NOTE — ED NOTES
Bed: ED22  Expected date:   Expected time:   Means of arrival: Ambulance  Comments:  HE   28F with chest pain  100 mcg fentanyl given per EMS

## 2019-10-25 NOTE — ED PROVIDER NOTES
[place] EMERGENCY DEPARTMENT ([facility])  10/25/19  History     Chief Complaint   Patient presents with     Chest Wall Pain     The history is provided by the patient and medical records.     Nevin Alvarado is a 28 year old female with a past medical history of ADD, anorexia nervosa with bulimia, anxiety, borderline personality disorder, depression, depressive disorder, morbid obesity, and posttraumatic stress disorder who presents to the Emergency Department with chest wall pain.  Patient states that the pain resides in the upper left chest and left lateral chest wall.  Patient states that the pain is also in the left side of her back.  The patient states she is S/P esophageal stent placement the patient states that this pain has been off-and-on for several days.  The patient says that this particular pain started 5 to 6 hours ago.  Patient states that she tried to take Tylenol-1000 mg roughly 2.5 hours ago with no resolution of the pain.  Patient states that after she took her Tylenol, she tried to go to sleep but was woken up by the pain.    Past Medical History:   Diagnosis Date     ADD (attention deficit disorder)      Anorexia nervosa with bulimia     history of; on Topamax     Anxiety      Borderline personality disorder (H)      Depression      Depressive disorder      H/O self-harm      Lives in independent group home     due to debilitating mental illness     Migraine without aura     no known triggers; on Topamax bid and Imitrex PRN     Morbid obesity (H)      PTSD (post-traumatic stress disorder)      Rectal foreign body - Recurrent issue, self placed      Swallowed foreign body - Recurrent issue, self placed        Past Surgical History:   Procedure Laterality Date     COMBINED ESOPHAGOSCOPY, GASTROSCOPY, DUODENOSCOPY (EGD), REPLACE ESOPHAGEAL STENT N/A 10/9/2019    Procedure: Upper Endoscopy with Suture Placement;  Surgeon: Zurdo Ramirez MD;  Location:  OR      ESOPHAGOSCOPY, GASTROSCOPY, DUODENOSCOPY (EGD), COMBINED N/A 3/9/2017    Procedure: COMBINED ESOPHAGOSCOPY, GASTROSCOPY, DUODENOSCOPY (EGD), REMOVE FOREIGN BODY;  Surgeon: Avis Guzmán MD;  Location: [facility] OR     ESOPHAGOSCOPY, GASTROSCOPY, DUODENOSCOPY (EGD), COMBINED N/A 4/20/2017    Procedure: COMBINED ESOPHAGOSCOPY, GASTROSCOPY, DUODENOSCOPY (EGD), REMOVE FOREIGN BODY;  EGD removal Foregin body;  Surgeon: Lokesh Paula MD;  Location: [facility] OR     ESOPHAGOSCOPY, GASTROSCOPY, DUODENOSCOPY (EGD), COMBINED N/A 6/12/2017    Procedure: COMBINED ESOPHAGOSCOPY, GASTROSCOPY, DUODENOSCOPY (EGD);  COMBINED ESOPHAGOSCOPY, GASTROSCOPY, DUODENOSCOPY (EGD) [[id number]]attempted removal of foreign body;  Surgeon: Pamela Perez MD;  Location: [facility] OR     ESOPHAGOSCOPY, GASTROSCOPY, DUODENOSCOPY (EGD), COMBINED N/A 6/9/2017    Procedure: COMBINED ESOPHAGOSCOPY, GASTROSCOPY, DUODENOSCOPY (EGD), REMOVE FOREIGN BODY;  Esophagoscopy, Gastroscopy, Duodenoscopy, Removal of Foreign Body;  Surgeon: Dejon Marsh MD;  Location: [facility] OR     ESOPHAGOSCOPY, GASTROSCOPY, DUODENOSCOPY (EGD), COMBINED N/A 1/6/2018    Procedure: COMBINED ESOPHAGOSCOPY, GASTROSCOPY, DUODENOSCOPY (EGD), REMOVE FOREIGN BODY;  COMBINED ESOPHAGOSCOPY, GASTROSCOPY, DUODENOSCOPY (EGD) [by pascal net and snare with profol sedation;  Surgeon: Feliciano Emmanuel MD;  Location:  GI     ESOPHAGOSCOPY, GASTROSCOPY, DUODENOSCOPY (EGD), COMBINED N/A 3/19/2018    Procedure: COMBINED ESOPHAGOSCOPY, GASTROSCOPY, DUODENOSCOPY (EGD), REMOVE FOREIGN BODY;   Esophagodscopy, Gastroscopy, Duodenoscopy,Foreign Body Removal;  Surgeon: Brice Guzmán MD;  Location: [facility]     ESOPHAGOSCOPY, GASTROSCOPY, DUODENOSCOPY (EGD), COMBINED N/A 4/16/2018    Procedure: COMBINED ESOPHAGOSCOPY, GASTROSCOPY, DUODENOSCOPY (EGD), REMOVE FOREIGN BODY;  Esophagogastroduodenoscopy  Foreign Body Removal X 2;  Surgeon: Royer Moise MD;  Location: [facility]      ESOPHAGOSCOPY, GASTROSCOPY, DUODENOSCOPY (EGD), COMBINED N/A 6/1/2018    Procedure: COMBINED ESOPHAGOSCOPY, GASTROSCOPY, DUODENOSCOPY (EGD), REMOVE FOREIGN BODY;  COMBINED ESOPHAGOSCOPY, GASTROSCOPY, DUODENOSCOPY with removal of foreign body, propofol sedation by anesthesia;  Surgeon: Brice Martinez MD;  Location:  GI     ESOPHAGOSCOPY, GASTROSCOPY, DUODENOSCOPY (EGD), COMBINED N/A 7/25/2018    Procedure: COMBINED ESOPHAGOSCOPY, GASTROSCOPY, DUODENOSCOPY (EGD), REMOVE FOREIGN BODY;;  Surgeon: Candy Castelan MD;  Location:  GI     ESOPHAGOSCOPY, GASTROSCOPY, DUODENOSCOPY (EGD), COMBINED N/A 7/28/2018    Procedure: COMBINED ESOPHAGOSCOPY, GASTROSCOPY, DUODENOSCOPY (EGD), REMOVE FOREIGN BODY;  COMBINED ESOPHAGOSCOPY, GASTROSCOPY, DUODENOSCOPY (EGD), REMOVE FOREIGN BODY;  Surgeon: Brice Guzmán MD;  [facility]: [facility]     ESOPHAGOSCOPY, GASTROSCOPY, DUODENOSCOPY (EGD), COMBINED N/A 7/31/2018    Procedure: COMBINED ESOPHAGOSCOPY, GASTROSCOPY, DUODENOSCOPY (EGD);  COMBINED ESOPHAGOSCOPY, GASTROSCOPY, DUODENOSCOPY (EGD) TO REMOVE FOREIGN BODY;  Surgeon: Lokesh Paula MD;  [facility]: [facility]     ESOPHAGOSCOPY, GASTROSCOPY, DUODENOSCOPY (EGD), COMBINED N/A 8/4/2018    Procedure: COMBINED ESOPHAGOSCOPY, GASTROSCOPY, DUODENOSCOPY (EGD), REMOVE FOREIGN BODY;   combined esophagoscopy, gastroscopy, duodenoscopy, REMOVE FOREIGN BODY ;  Surgeon: Lokesh Paula MD;  Location: [facility] OR     ESOPHAGOSCOPY, GASTROSCOPY, DUODENOSCOPY (EGD), COMBINED N/A 10/6/2019    Procedure: ESOPHAGOGASTRODUODENOSCOPY (EGD) with fireign body removal x2, esophageal stent placement with floroscopy;  Surgeon: Timoteo Espana MD;  Location: [facility]     EXAM UNDER ANESTHESIA ANUS N/A 1/10/2017    Procedure: EXAM UNDER ANESTHESIA ANUS;  Surgeon: Annmarie Haynes MD;  Location: [facility] OR     EXAM UNDER ANESTHESIA RECTUM N/A 7/19/2018    Procedure: EXAM UNDER ANESTHESIA RECTUM;  EXAM UNDER ANESTHESIA, REMOVAL OF RECTAL  FOREIGN BODY;  Surgeon: Annmarie Haynes MD;  Location: [facility] OR     HC REMOVE FECAL IMPACTION OR FB W ANESTHESIA N/A 12/18/2016    Procedure: REMOVE FECAL IMPACTION/FOREIGN BODY UNDER ANESTHESIA;  Surgeon: Soham Cano MD;  Location: [facility] OR     KNEE SURGERY - removed a small tissue mass from knee Right      LAPAROSCOPIC ABLATION ENDOMETRIOSIS       LAPAROTOMY EXPLORATORY N/A 1/10/2017    Procedure: LAPAROTOMY EXPLORATORY;  Surgeon: Annmarie Haynes MD;  Location: [facility] OR     LAPAROTOMY EXPLORATORY  09/11/2019    Manual manipulation of bowel to remove pill bottle in rectum     lymph nodes removed from neck; benign  age 7     MAMMOPLASTY REDUCTION Bilateral      RELEASE CARPAL TUNNEL Bilateral      SIGMOIDOSCOPY FLEXIBLE N/A 1/10/2017    Procedure: SIGMOIDOSCOPY FLEXIBLE;  Surgeon: Annmarie Haynes MD;  Location: [facility]     SIGMOIDOSCOPY FLEXIBLE N/A 5/8/2018    Procedure: SIGMOIDOSCOPY FLEXIBLE;  flex sig with foreign body removal using snare and rattooth forcep;  Surgeon: Soham Cano MD;  Location:  GI     SIGMOIDOSCOPY FLEXIBLE N/A 2/20/2019    Procedure: Exam under anesthesia Colonoscopy with attempted  removal of rectal foreign body;  Surgeon: Estrada Chávez MD;  Location: [facility] OR       Family History   Problem Relation Age of Onset     Diabetes Type 2  Maternal Grandmother      Diabetes Type 2  Paternal Grandmother      Breast Cancer Paternal Grandmother      Cerebrovascular Disease Father 53     Myocardial Infarction No family hx of      Coronary Artery Disease Early Onset No family hx of      Ovarian Cancer No family hx of      Colon Cancer No family hx of        Social History     Tobacco Use     Smoking status: Never Smoker     Smokeless tobacco: Never Used   Substance Use Topics     Alcohol use: No     Alcohol/week: 0.0 standard drinks       No current facility-administered medications for this encounter.      Current Outpatient Medications   Medication     acetaminophen  (TYLENOL) 500 MG tablet     albuterol (PROAIR HFA) 108 (90 Base) MCG/ACT inhaler     busPIRone (BUSPAR) 10 MG tablet     cloNIDine (CATAPRES) 0.1 MG tablet     desvenlafaxine (PRISTIQ) 100 MG 24 hr tablet     diphenhydrAMINE (BENADRYL) 25 MG capsule     docusate (COLACE) 50 MG/5ML liquid     docusate sodium (COLACE) 100 MG capsule     gabapentin (NEURONTIN) 300 MG capsule     ibuprofen (ADVIL/MOTRIN) 100 MG/5ML suspension     lurasidone (LATUDA) 60 MG TABS tablet     menthol (ICY HOT) 5 % PTCH     metFORMIN (GLUCOPHAGE-XR) 500 MG 24 hr tablet     omeprazole (PRILOSEC) 2 mg/mL suspension     ondansetron (ZOFRAN-ODT) 8 MG ODT tab     oxyCODONE (ROXICODONE) 5 MG/5ML solution     polyethylene glycol (MIRALAX/GLYCOLAX) packet     topiramate (TOPAMAX) 100 MG tablet     vitamin D3 (CHOLECALCIFEROL) 2000 units (50 mcg) tablet        Allergies   Allergen Reactions     Amoxicillin-Pot Clavulanate Other (See Comments), Rash and Swelling     PN: facial swelling, left side. Also had cortisone injection the same day as she started the Augmentin.  PN: facial swelling, left side. Also had cortisone injection the same day as she started the Augmentin.  Noted in downtime recovery of chart.       Penicillins Anaphylaxis     Blood-Group Specific Substance Other (See Comments)     Patient has an anti-Cw and non-specific antibodies. Blood product orders may be delayed. Draw one red top and two purple top tubes for all type/screen/crossmatch orders.     Hydrocodone Nausea and Vomiting     vomiting for days     Influenza Vaccines Other (See Comments)     Oseltamivir Hives     med stopped, PN: med stopped     Vancomycin Swelling     Vicodin [Hydrocodone-Acetaminophen] Nausea and Vomiting     Cephalosporins Rash     Lamotrigine Rash     Possibly associated with Lamictal.      Latex Rash     I have reviewed the Medications, Allergies, Past Medical and Surgical History, and Social History in the Epic system.    Review of Systems   Respiratory:  "Positive for shortness of breath.    Cardiovascular: Positive for chest pain. Negative for leg swelling.   Gastrointestinal: Positive for abdominal pain. Negative for vomiting.       Physical Exam   BP: 127/71  Pulse: 100  Temp: 98.3  F (36.8  C)  Resp: 20  Height: 157.5 cm (5' 2\")  Weight: 118.4 kg (261 lb)  SpO2: 96 %      Physical Exam  Constitutional:       General: She is not in acute distress.     Appearance: She is well-developed. She is not diaphoretic.      Comments: Comfortably resting, lying in bed, NAD, nondiaphoretic, lucid, fully conversant, no  respiratory distress, alert and oriented.     HENT:      Head: Normocephalic and atraumatic.      Mouth/Throat:      Pharynx: No oropharyngeal exudate.   Eyes:      General: No scleral icterus.     Pupils: Pupils are equal, round, and reactive to light.   Neck:      Musculoskeletal: Normal range of motion and neck supple.   Cardiovascular:      Rate and Rhythm: Normal rate.      Heart sounds: Normal heart sounds.   Pulmonary:      Effort: Pulmonary effort is normal. No accessory muscle usage, prolonged expiration or respiratory distress.      Breath sounds: Normal breath sounds.   Chest:       Abdominal:      General: Bowel sounds are normal.      Palpations: Abdomen is soft.      Tenderness: There is no tenderness.   Musculoskeletal:         General: No tenderness.   Skin:     General: Skin is warm and dry.      Capillary Refill: Capillary refill takes less than 2 seconds.      Coloration: Skin is not pale.      Findings: No erythema or rash.   Neurological:      Mental Status: She is alert and oriented to person, place, and time.         ED Course   12:33 AM  The patient was seen and examined by Jeffy Velasquez MD in Room ED22.     ED Course as of Oct 25 0243   Fri Oct 25, 2019   0240 EKG 12-lead, tracing only     Procedures             EKG Interpretation:      Interpreted by Jeffy Velasquez MD  Time reviewed:   Symptoms at time of EKG: chest wall pain "   Rhythm: normal sinus   Rate: normal  Axis: normal  Ectopy: none  Conduction: normal  ST Segments/ T Waves: No ST-T wave changes  Q Waves: none  Comparison to prior: Unchanged    Clinical Impression: normal EKG          Critical Care time:  none     Results for orders placed or performed during the hospital encounter of 10/25/19   XR Chest 2 Views    Impression    IMPRESSION:  1. Postsurgical changes of esophageal stent.  2. No focal airspace opacity.   CBC with platelets differential   Result Value Ref Range    WBC 8.5 4.0 - 11.0 10e9/L    RBC Count 4.17 3.8 - 5.2 10e12/L    Hemoglobin 11.8 11.7 - 15.7 g/dL    Hematocrit 37.8 35.0 - 47.0 %    MCV 91 78 - 100 fl    MCH 28.3 26.5 - 33.0 pg    MCHC 31.2 (L) 31.5 - 36.5 g/dL    RDW 14.5 10.0 - 15.0 %    Platelet Count 316 150 - 450 10e9/L    Diff Method Automated Method     % Neutrophils 66.2 %    % Lymphocytes 21.5 %    % Monocytes 7.3 %    % Eosinophils 4.1 %    % Basophils 0.8 %    % Immature Granulocytes 0.1 %    Nucleated RBCs 0 0 /100    Absolute Neutrophil 5.6 1.6 - 8.3 10e9/L    Absolute Lymphocytes 1.8 0.8 - 5.3 10e9/L    Absolute Monocytes 0.6 0.0 - 1.3 10e9/L    Absolute Eosinophils 0.4 0.0 - 0.7 10e9/L    Absolute Basophils 0.1 0.0 - 0.2 10e9/L    Abs Immature Granulocytes 0.0 0 - 0.4 10e9/L    Absolute Nucleated RBC 0.0    Comprehensive metabolic panel   Result Value Ref Range    Sodium 139 133 - 144 mmol/L    Potassium 3.7 3.4 - 5.3 mmol/L    Chloride 107 94 - 109 mmol/L    Carbon Dioxide 25 20 - 32 mmol/L    Anion Gap 6 3 - 14 mmol/L    Glucose 102 (H) 70 - 99 mg/dL    Urea Nitrogen 9 7 - 30 mg/dL    Creatinine 0.59 0.52 - 1.04 mg/dL    GFR Estimate >90 >60 mL/min/[1.73_m2]    GFR Estimate If Black >90 >60 mL/min/[1.73_m2]    Calcium 9.2 8.5 - 10.1 mg/dL    Bilirubin Total 0.3 0.2 - 1.3 mg/dL    Albumin 3.4 3.4 - 5.0 g/dL    Protein Total 7.7 6.8 - 8.8 g/dL    Alkaline Phosphatase 74 40 - 150 U/L    ALT 27 0 - 50 U/L    AST 18 0 - 45 U/L   Troponin I    Result Value Ref Range    Troponin I ES <0.015 0.000 - 0.045 ug/L               Labs Ordered and Resulted from Time of ED Arrival Up to the Time of Departure from the ED   CBC WITH PLATELETS DIFFERENTIAL - Abnormal; Notable for the following components:       Result Value    MCHC 31.2 (*)     All other components within normal limits   COMPREHENSIVE METABOLIC PANEL - Abnormal; Notable for the following components:    Glucose 102 (*)     All other components within normal limits   TROPONIN I   UA MACROSCOPIC WITH REFLEX TO MICRO AND CULTURE         I have reviewed the patient's prior chart including recent labs, ER visits, and available inpatient discharge summaries if available.      Assessments & Plan (with Medical Decision Making)   This is a 28-year-old female patient presenting to the emergency room with left-sided chest wall discomfort.  On physical exam she palpates her chest and is able to reproduce a tenderness in her left upper chest.  She states that the pain is not there if she does not palpate it.  She also has some left chest wall discomfort underneath her left axilla which she also is able to reproduce with palpation but otherwise has no discomfort.  Patient has a long history of psych issues as well as swallows foreign objects.  She was recently admitted to the hospital and provided with a esophageal stent due to an esophageal perforation from a foreign body.  Today she denies swallowing any foreign bodies and is otherwise hemodynamically stable and resting comfortably EKG is otherwise normal.  Labs are reviewed which are unremarkable.  At this time we are still waiting a urinalysis.  If this is stable I believe she can be safely discharged.  She was provided with naproxen here in the emergency room for discomfort but I do not believe this is cardiac in nature is most likely associated with a chest wall discomfort.  I do see multiple ED visits and her chart review with similar complaints with a  "negative work-up.  I believe this is a chronic issue and she can be safely discharged with close follow-up by her primary care physician swelling as her UA is unremarkable.    I have reviewed the nursing notes.    I have reviewed the findings, diagnosis, plan and need for follow up with the patient.    New Prescriptions    No medications on file       Final diagnoses:   None   IMaximus, am serving as a trained medical scribe to document services personally performed by Jeffy Velasquez MD, based on the provider's statements to me.      IJeffy MD, was physically present and have reviewed and verified the accuracy of this note documented by Maximus Osborn.     \"This dictation was performed with the assistance of voice recognition software and may contain inadvertant transcription  errors,  omissions and/or  inadvertent word substitution.\" --Jeffy Velasquez MD     10/25/2019   [facility] EMERGENCY DEPARTMENT     Jeffy Velasquez MD  10/25/19 0300    "

## 2019-10-25 NOTE — ED TRIAGE NOTES
From home w/ c/o chest wall pain in the upper left chest and left lateral chest wall. Worse w/ palpation and deep breaths.

## 2019-10-25 NOTE — ED AVS SNAPSHOT
[facility], [place], Emergency Department  [address] [place] [id number]  Phone:  [phone]                                    Nevin Alvarado   MRN: [id number]    Department:  [facility], Emergency Department   Date of Visit:  10/25/2019           After Visit Summary Signature Page    I have received my discharge instructions, and my questions have been answered. I have discussed any challenges I see with this plan with the nurse or doctor.    ..........................................................................................................................................  Patient/Patient Representative Signature      ..........................................................................................................................................  Patient Representative Print Name and Relationship to Patient    ..................................................               ................................................  Date                                   Time    ..........................................................................................................................................  Reviewed by Signature/Title    ...................................................              ..............................................  Date                                               Time          22EPIC Rev 08/18

## 2019-10-26 ENCOUNTER — HOSPITAL ENCOUNTER (EMERGENCY)
Facility: CLINIC | Age: 29
Discharge: HOME | End: 2019-10-26
Attending: EMERGENCY MEDICINE | Admitting: EMERGENCY MEDICINE
Payer: COMMERCIAL

## 2019-10-26 ENCOUNTER — APPOINTMENT (OUTPATIENT)
Dept: GENERAL RADIOLOGY | Facility: CLINIC | Age: 29
End: 2019-10-26
Attending: EMERGENCY MEDICINE
Payer: COMMERCIAL

## 2019-10-26 VITALS
RESPIRATION RATE: 13 BRPM | HEIGHT: 62 IN | OXYGEN SATURATION: 97 % | TEMPERATURE: 97.6 F | BODY MASS INDEX: 48.03 KG/M2 | HEART RATE: 96 BPM | WEIGHT: 261 LBS | DIASTOLIC BLOOD PRESSURE: 85 MMHG | SYSTOLIC BLOOD PRESSURE: 120 MMHG

## 2019-10-26 DIAGNOSIS — R07.9 CHEST PAIN, UNSPECIFIED TYPE: ICD-10-CM

## 2019-10-26 DIAGNOSIS — F60.3 BORDERLINE PERSONALITY DISORDER (H): ICD-10-CM

## 2019-10-26 LAB
BASOPHILS # BLD AUTO: 0 10E9/L (ref 0–0.2)
BASOPHILS NFR BLD AUTO: 0.6 %
DIFFERENTIAL METHOD BLD: ABNORMAL
EOSINOPHIL # BLD AUTO: 0.3 10E9/L (ref 0–0.7)
EOSINOPHIL NFR BLD AUTO: 3.8 %
ERYTHROCYTE [DISTWIDTH] IN BLOOD BY AUTOMATED COUNT: 14.3 % (ref 10–15)
HCT VFR BLD AUTO: 37.5 % (ref 35–47)
HGB BLD-MCNC: 11.7 G/DL (ref 11.7–15.7)
IMM GRANULOCYTES # BLD: 0 10E9/L (ref 0–0.4)
IMM GRANULOCYTES NFR BLD: 0.1 %
LYMPHOCYTES # BLD AUTO: 1.4 10E9/L (ref 0.8–5.3)
LYMPHOCYTES NFR BLD AUTO: 20.1 %
MCH RBC QN AUTO: 28.3 PG (ref 26.5–33)
MCHC RBC AUTO-ENTMCNC: 31.2 G/DL (ref 31.5–36.5)
MCV RBC AUTO: 91 FL (ref 78–100)
MONOCYTES # BLD AUTO: 0.5 10E9/L (ref 0–1.3)
MONOCYTES NFR BLD AUTO: 7.3 %
NEUTROPHILS # BLD AUTO: 4.8 10E9/L (ref 1.6–8.3)
NEUTROPHILS NFR BLD AUTO: 68.1 %
NRBC # BLD AUTO: 0 10*3/UL
NRBC BLD AUTO-RTO: 0 /100
PLATELET # BLD AUTO: 336 10E9/L (ref 150–450)
RBC # BLD AUTO: 4.14 10E12/L (ref 3.8–5.2)
WBC # BLD AUTO: 7.1 10E9/L (ref 4–11)

## 2019-10-26 PROCEDURE — 25000128 H RX IP 250 OP 636: Performed by: EMERGENCY MEDICINE

## 2019-10-26 PROCEDURE — 25000132 ZZH RX MED GY IP 250 OP 250 PS 637: Performed by: EMERGENCY MEDICINE

## 2019-10-26 PROCEDURE — 71046 X-RAY EXAM CHEST 2 VIEWS: CPT

## 2019-10-26 PROCEDURE — 96361 HYDRATE IV INFUSION ADD-ON: CPT | Performed by: EMERGENCY MEDICINE

## 2019-10-26 PROCEDURE — 99284 EMERGENCY DEPT VISIT MOD MDM: CPT | Mod: Z6 | Performed by: EMERGENCY MEDICINE

## 2019-10-26 PROCEDURE — 99284 EMERGENCY DEPT VISIT MOD MDM: CPT | Mod: 25 | Performed by: EMERGENCY MEDICINE

## 2019-10-26 PROCEDURE — 96374 THER/PROPH/DIAG INJ IV PUSH: CPT | Performed by: EMERGENCY MEDICINE

## 2019-10-26 PROCEDURE — 85025 COMPLETE CBC W/AUTO DIFF WBC: CPT | Performed by: EMERGENCY MEDICINE

## 2019-10-26 RX ORDER — KETOROLAC TROMETHAMINE 30 MG/ML
30 INJECTION, SOLUTION INTRAMUSCULAR; INTRAVENOUS ONCE
Status: COMPLETED | OUTPATIENT
Start: 2019-10-26 | End: 2019-10-26

## 2019-10-26 RX ORDER — ACETAMINOPHEN 325 MG/1
650 TABLET ORAL ONCE
Status: COMPLETED | OUTPATIENT
Start: 2019-10-26 | End: 2019-10-26

## 2019-10-26 RX ORDER — HEPARIN SODIUM (PORCINE) LOCK FLUSH IV SOLN 100 UNIT/ML 100 UNIT/ML
5 SOLUTION INTRAVENOUS ONCE
Status: COMPLETED | OUTPATIENT
Start: 2019-10-26 | End: 2019-10-26

## 2019-10-26 RX ADMIN — KETOROLAC TROMETHAMINE 30 MG: 30 INJECTION, SOLUTION INTRAMUSCULAR at 16:33

## 2019-10-26 RX ADMIN — SODIUM CHLORIDE 1000 ML: 9 INJECTION, SOLUTION INTRAVENOUS at 16:33

## 2019-10-26 RX ADMIN — Medication 5 ML: at 17:51

## 2019-10-26 RX ADMIN — ACETAMINOPHEN 650 MG: 325 TABLET, FILM COATED ORAL at 17:46

## 2019-10-26 ASSESSMENT — ENCOUNTER SYMPTOMS
ABDOMINAL PAIN: 1
DIARRHEA: 0
DYSURIA: 0
COUGH: 1
FEVER: 0
HEMATURIA: 0
BACK PAIN: 1
ROS GI COMMENTS: POSITIVE FOR SOFT STOOLS.
SORE THROAT: 1

## 2019-10-26 ASSESSMENT — MIFFLIN-ST. JEOR: SCORE: 1872.14

## 2019-10-26 NOTE — ED PROVIDER NOTES
"      [facility]MENT ([facility])  October 26, 2019    History     Chief Complaint   Patient presents with     Abdominal Pain     HPI  Nevin Alvarado is a 28 year old female with a very long psychiatric history who presents to the ED for evaluation of left-sided abdominal pain that radiates to her lower back that started last night at 8:00 PM. Patient reports her abdominal/back pain kept her awake all night. She complains the pain is worse with deep breaths. She states the pain is sharp and it has been getting worse, which is why she came to the ED today. She has a long hx of self injurious behavior by swallowing foreign bodies, and in early October she had an EGD which was complicated by perforation, necessitating transfer to the [facility] thoracic surgery service where she had esophageal stent placement.    Patient also complains of worsening upper back pain since her esophageal stent, especially when she eats. She states she has tried calling her thoracic surgeon to follow up, but they \"don't want to follow up\" with her. She reports her thoracic surgeon does want her to come in for surgery on 11/5/19 though. Patient denies fever, congestion, dysuria, or hematuria. She notes she has been having very soft stools, but denies diarrhea. She denies using stool softeners. Patient states she does have a sore throat and a dry cough. She reports she does not get menstrual periods anymore, because she has an IUD. She notes she lives by herself at home.      PAST MEDICAL HISTORY  Past Medical History:   Diagnosis Date     ADD (attention deficit disorder)      Anorexia nervosa with bulimia     history of; on Topamax     Anxiety      Borderline personality disorder (H)      Depression      Depressive disorder      H/O self-harm      Lives in independent group home     due to debilitating mental illness     Migraine without aura     no known triggers; on Topamax bid and Imitrex PRN     Morbid obesity " (H)      PTSD (post-traumatic stress disorder)      Rectal foreign body - Recurrent issue, self placed      Swallowed foreign body - Recurrent issue, self placed      PAST SURGICAL HISTORY  Past Surgical History:   Procedure Laterality Date     COMBINED ESOPHAGOSCOPY, GASTROSCOPY, DUODENOSCOPY (EGD), REPLACE ESOPHAGEAL STENT N/A 10/9/2019    Procedure: Upper Endoscopy with Suture Placement;  Surgeon: Zurdo Ramirez MD;  [facility]: [facility]     ESOPHAGOSCOPY, GASTROSCOPY, DUODENOSCOPY (EGD), COMBINED N/A 3/9/2017    Procedure: COMBINED ESOPHAGOSCOPY, GASTROSCOPY, DUODENOSCOPY (EGD), REMOVE FOREIGN BODY;  Surgeon: Avis Guzmán MD;  Location: [facility]     ESOPHAGOSCOPY, GASTROSCOPY, DUODENOSCOPY (EGD), COMBINED N/A 4/20/2017    Procedure: COMBINED ESOPHAGOSCOPY, GASTROSCOPY, DUODENOSCOPY (EGD), REMOVE FOREIGN BODY;  EGD removal Foregin body;  Surgeon: Lokesh Paula MD;  Location: [facility]     ESOPHAGOSCOPY, GASTROSCOPY, DUODENOSCOPY (EGD), COMBINED N/A 6/12/2017    Procedure: COMBINED ESOPHAGOSCOPY, GASTROSCOPY, DUODENOSCOPY (EGD);  COMBINED ESOPHAGOSCOPY, GASTROSCOPY, DUODENOSCOPY (EGD) [[id number]]attempted removal of foreign body;  Surgeon: Pamela Perez MD;  [facility]: [facility]     ESOPHAGOSCOPY, GASTROSCOPY, DUODENOSCOPY (EGD), COMBINED N/A 6/9/2017    Procedure: COMBINED ESOPHAGOSCOPY, GASTROSCOPY, DUODENOSCOPY (EGD), REMOVE FOREIGN BODY;  Esophagoscopy, Gastroscopy, Duodenoscopy, Removal of Foreign Body;  Surgeon: Dejon Marsh MD;  [facility]: [facility]     ESOPHAGOSCOPY, GASTROSCOPY, DUODENOSCOPY (EGD), COMBINED N/A 1/6/2018    Procedure: COMBINED ESOPHAGOSCOPY, GASTROSCOPY, DUODENOSCOPY (EGD), REMOVE FOREIGN BODY;  COMBINED ESOPHAGOSCOPY, GASTROSCOPY, DUODENOSCOPY (EGD) [by pascal net and snare with profol sedation;  Surgeon: Feliciano Emmanuel MD;  Location: [facility]     ESOPHAGOSCOPY, GASTROSCOPY, DUODENOSCOPY (EGD), COMBINED N/A 3/19/2018    Procedure: COMBINED  ESOPHAGOSCOPY, GASTROSCOPY, DUODENOSCOPY (EGD), REMOVE FOREIGN BODY;   Esophagodscopy, Gastroscopy, Duodenoscopy,Foreign Body Removal;  Surgeon: Brice Guzmán MD;  [facility]     ESOPHAGOSCOPY, GASTROSCOPY, DUODENOSCOPY (EGD), COMBINED N/A 4/16/2018    Procedure: COMBINED ESOPHAGOSCOPY, GASTROSCOPY, DUODENOSCOPY (EGD), REMOVE FOREIGN BODY;  Esophagogastroduodenoscopy  Foreign Body Removal X 2;  Surgeon: Royer Moise MD;  [facility]     ESOPHAGOSCOPY, GASTROSCOPY, DUODENOSCOPY (EGD), COMBINED N/A 6/1/2018    Procedure: COMBINED ESOPHAGOSCOPY, GASTROSCOPY, DUODENOSCOPY (EGD), REMOVE FOREIGN BODY;  COMBINED ESOPHAGOSCOPY, GASTROSCOPY, DUODENOSCOPY with removal of foreign body, propofol sedation by anesthesia;  Surgeon: Brice Martinez MD;  Location:  GI     ESOPHAGOSCOPY, GASTROSCOPY, DUODENOSCOPY (EGD), COMBINED N/A 7/25/2018    Procedure: COMBINED ESOPHAGOSCOPY, GASTROSCOPY, DUODENOSCOPY (EGD), REMOVE FOREIGN BODY;;  Surgeon: Candy Castelan MD;  Location:  GI     ESOPHAGOSCOPY, GASTROSCOPY, DUODENOSCOPY (EGD), COMBINED N/A 7/28/2018    Procedure: COMBINED ESOPHAGOSCOPY, GASTROSCOPY, DUODENOSCOPY (EGD), REMOVE FOREIGN BODY;  COMBINED ESOPHAGOSCOPY, GASTROSCOPY, DUODENOSCOPY (EGD), REMOVE FOREIGN BODY;  Surgeon: Brice Guzmán MD;  [facility]     ESOPHAGOSCOPY, GASTROSCOPY, DUODENOSCOPY (EGD), COMBINED N/A 7/31/2018    Procedure: COMBINED ESOPHAGOSCOPY, GASTROSCOPY, DUODENOSCOPY (EGD);  COMBINED ESOPHAGOSCOPY, GASTROSCOPY, DUODENOSCOPY (EGD) TO REMOVE FOREIGN BODY;  Surgeon: Lokesh Paula MD;  [facility]     ESOPHAGOSCOPY, GASTROSCOPY, DUODENOSCOPY (EGD), COMBINED N/A 8/4/2018    Procedure: COMBINED ESOPHAGOSCOPY, GASTROSCOPY, DUODENOSCOPY (EGD), REMOVE FOREIGN BODY;   combined esophagoscopy, gastroscopy, duodenoscopy, REMOVE FOREIGN BODY ;  Surgeon: Lokesh Paula MD;  [facility]     ESOPHAGOSCOPY, GASTROSCOPY, DUODENOSCOPY (EGD), COMBINED N/A  10/6/2019    Procedure: ESOPHAGOGASTRODUODENOSCOPY (EGD) with fireign body removal x2, esophageal stent placement with floroscopy;  Surgeon: Timoteo Espana MD;  [facility]     EXAM UNDER ANESTHESIA ANUS N/A 1/10/2017    Procedure: EXAM UNDER ANESTHESIA ANUS;  Surgeon: Annmarie Haynes MD;  [facility]     EXAM UNDER ANESTHESIA RECTUM N/A 7/19/2018    Procedure: EXAM UNDER ANESTHESIA RECTUM;  EXAM UNDER ANESTHESIA, REMOVAL OF RECTAL FOREIGN BODY;  Surgeon: Annmarie Haynes MD;  [facility]: [facility]     HC REMOVE FECAL IMPACTION OR FB W ANESTHESIA N/A 12/18/2016    Procedure: REMOVE FECAL IMPACTION/FOREIGN BODY UNDER ANESTHESIA;  Surgeon: Soham Cano MD;  Location:  OR     KNEE SURGERY - removed a small tissue mass from knee Right      LAPAROSCOPIC ABLATION ENDOMETRIOSIS       LAPAROTOMY EXPLORATORY N/A 1/10/2017    Procedure: LAPAROTOMY EXPLORATORY;  Surgeon: Annmarie Haynes MD;  [facility]     LAPAROTOMY EXPLORATORY  09/11/2019    Manual manipulation of bowel to remove pill bottle in rectum     lymph nodes removed from neck; benign  age 7     MAMMOPLASTY REDUCTION Bilateral      RELEASE CARPAL TUNNEL Bilateral      SIGMOIDOSCOPY FLEXIBLE N/A 1/10/2017    Procedure: SIGMOIDOSCOPY FLEXIBLE;  Surgeon: Annmarie Haynes MD;  [facility]: [facility]     SIGMOIDOSCOPY FLEXIBLE N/A 5/8/2018    Procedure: SIGMOIDOSCOPY FLEXIBLE;  flex sig with foreign body removal using snare and rattooth forcep;  Surgeon: Soham Cano MD;  Location:  GI     SIGMOIDOSCOPY FLEXIBLE N/A 2/20/2019    Procedure: Exam under anesthesia Colonoscopy with attempted  removal of rectal foreign body;  Surgeon: Estrada Chávez MD;  Location: [facility]     FAMILY HISTORY  Family History   Problem Relation Age of Onset     Diabetes Type 2  Maternal Grandmother      Diabetes Type 2  Paternal Grandmother      Breast Cancer Paternal Grandmother      Cerebrovascular Disease Father 53     Myocardial Infarction  No family hx of      Coronary Artery Disease Early Onset No family hx of      Ovarian Cancer No family hx of      Colon Cancer No family hx of      SOCIAL HISTORY  Social History     Tobacco Use     Smoking status: Never Smoker     Smokeless tobacco: Never Used   Substance Use Topics     Alcohol use: No     Alcohol/week: 0.0 standard drinks     MEDICATIONS  No current facility-administered medications for this encounter.      Current Outpatient Medications   Medication     acetaminophen (TYLENOL) 500 MG tablet     albuterol (PROAIR HFA) 108 (90 Base) MCG/ACT inhaler     cloNIDine (CATAPRES) 0.1 MG tablet     desvenlafaxine (PRISTIQ) 100 MG 24 hr tablet     gabapentin (NEURONTIN) 300 MG capsule     ibuprofen (ADVIL/MOTRIN) 100 MG/5ML suspension     lurasidone (LATUDA) 60 MG TABS tablet     metFORMIN (GLUCOPHAGE-XR) 500 MG 24 hr tablet     omeprazole (PRILOSEC) 2 mg/mL suspension     ondansetron (ZOFRAN-ODT) 8 MG ODT tab     topiramate (TOPAMAX) 100 MG tablet     vitamin D3 (CHOLECALCIFEROL) 2000 units (50 mcg) tablet     busPIRone (BUSPAR) 10 MG tablet     diphenhydrAMINE (BENADRYL) 25 MG capsule     docusate sodium (COLACE) 100 MG capsule     menthol (ICY HOT) 5 % PTCH     polyethylene glycol (MIRALAX/GLYCOLAX) packet     ALLERGIES  Allergies   Allergen Reactions     Amoxicillin-Pot Clavulanate Other (See Comments), Rash and Swelling     PN: facial swelling, left side. Also had cortisone injection the same day as she started the Augmentin.  PN: facial swelling, left side. Also had cortisone injection the same day as she started the Augmentin.  Noted in downtime recovery of chart.       Penicillins Anaphylaxis     Blood-Group Specific Substance Other (See Comments)     Patient has an anti-Cw and non-specific antibodies. Blood product orders may be delayed. Draw one red top and two purple top tubes for all type/screen/crossmatch orders.     Hydrocodone Nausea and Vomiting     vomiting for days     Influenza Vaccines  "Other (See Comments)     Oseltamivir Hives     med stopped, PN: med stopped     Vancomycin Swelling     Vicodin [Hydrocodone-Acetaminophen] Nausea and Vomiting     Cephalosporins Rash     Lamotrigine Rash     Possibly associated with Lamictal.      Latex Rash       I have reviewed the Medications, Allergies, Past Medical and Surgical History, and Social History in the Epic system.    Review of Systems   Constitutional: Negative for fever.   HENT: Positive for sore throat. Negative for congestion.    Respiratory: Positive for cough (dry).    Gastrointestinal: Positive for abdominal pain (left-sided). Negative for diarrhea.        Positive for soft stools.   Genitourinary: Negative for dysuria and hematuria.   Musculoskeletal: Positive for back pain (lower and upper).       Physical Exam   BP: 115/72  Pulse: 96  Heart Rate: 95  Temp: 97.6  F (36.4  C)  Resp: 12  Height: 157.5 cm (5' 2\")  Weight: 118.4 kg (261 lb)  SpO2: 99 %      Physical Exam  Vitals signs and nursing note reviewed.   Constitutional:       General: She is not in acute distress.     Appearance: She is well-developed. She is not diaphoretic.   HENT:      Head: Normocephalic and atraumatic.   Eyes:      Conjunctiva/sclera: Conjunctivae normal.      Pupils: Pupils are equal, round, and reactive to light.   Cardiovascular:      Rate and Rhythm: Normal rate and regular rhythm.      Heart sounds: Normal heart sounds.   Pulmonary:      Effort: Pulmonary effort is normal. No respiratory distress.      Breath sounds: Normal breath sounds. No wheezing or rales.   Abdominal:      General: Bowel sounds are normal. There is no distension.      Palpations: Abdomen is soft.      Tenderness: There is no tenderness.   Skin:     General: Skin is warm and dry.   Neurological:      Mental Status: She is alert and oriented to person, place, and time.   Psychiatric:      Comments: Flat affect, no SI         ED Course        Procedures             Critical Care time:  " none             Results for orders placed or performed during the hospital encounter of 10/26/19   Chest XR,  PA & LAT    Narrative    Exam:  Chest X-ray 10/26/2019 5:11 PM    History: chest pain, hx of esophageal stent placement, now with pain,  eval for stent migration, perforation, FB, interval change    Comparison: X-ray 2/25/2019    Findings: Right chest wall Port-A-Cath with the tip projecting over  the cavoatrial junction. Esophageal stent is unchanged. The trachea is  midline. The cardiomediastinal silhouette is unremarkable. The  pulmonary vasculature is distinct No pleural effusion. No focal  pulmonary opacity. No pneumothorax. The visualized upper abdomen is  unremarkable. No acute osseous findings. Scoliosis of the spine.      Impression    Impression:   1. Unchanged appearance of the esophageal stent. No pneumomediastinum  to suggest perforation.  2. No acute airspace disease.    I have personally reviewed the examination and initial interpretation  and I agree with the findings.    ABAD LEVIN MD   CBC with platelets differential   Result Value Ref Range    WBC 7.1 4.0 - 11.0 10e9/L    RBC Count 4.14 3.8 - 5.2 10e12/L    Hemoglobin 11.7 11.7 - 15.7 g/dL    Hematocrit 37.5 35.0 - 47.0 %    MCV 91 78 - 100 fl    MCH 28.3 26.5 - 33.0 pg    MCHC 31.2 (L) 31.5 - 36.5 g/dL    RDW 14.3 10.0 - 15.0 %    Platelet Count 336 150 - 450 10e9/L    Diff Method Automated Method     % Neutrophils 68.1 %    % Lymphocytes 20.1 %    % Monocytes 7.3 %    % Eosinophils 3.8 %    % Basophils 0.6 %    % Immature Granulocytes 0.1 %    Nucleated RBCs 0 0 /100    Absolute Neutrophil 4.8 1.6 - 8.3 10e9/L    Absolute Lymphocytes 1.4 0.8 - 5.3 10e9/L    Absolute Monocytes 0.5 0.0 - 1.3 10e9/L    Absolute Eosinophils 0.3 0.0 - 0.7 10e9/L    Absolute Basophils 0.0 0.0 - 0.2 10e9/L    Abs Immature Granulocytes 0.0 0 - 0.4 10e9/L    Absolute Nucleated RBC 0.0               Assessments & Plan (with Medical Decision Making)    This is a 28-year-old female who presents to the Emergency Department today complaining of left-sided chest/rib pain and some back pain which seems to started last night.  She states that it has continued today and she is having difficulty swallowing.  Her history is remarkable for borderline personality disorder and multiple, multiple, multiple ingestions of foreign bodies including foreign bodies in the rectum, vagina, as well as GI tract.  She has required literally dozens of EGDs and has had laparotomies multiple times in the past few years due to her intentional self-harm.  She recently had an esophageal foreign body ingestion and was seen at [facility] on October 6 for this, apparently during attempt to retrieve this esophageal perforation was noted and she was subsequently transferred to the [facility] for thoracic surgery evaluation.  Thoracic surgery placed an esophageal stent and removed foreign body.  She has plans to come back on November 5 for surgery with the thoracic surgery service to remove the stent and definitively repair the perforation.  She has been seen, since her initial stent placement, in the ED now 8 times.  She was seen about 36 hours ago in the ED with similar symptoms.    On my evaluation in the Emergency Department she is alert, cooperative, no acute distress.  She is afebrile and nontoxic, she is not tachycardic.    We did immediately place a sitter on her given her known prior behavior.    I did speak to the radiology resident about this.  My underlying suspicion is that her pain is related to her esophageal stent which is probably fairly normal for this particular device.  Need to consider the possibility of stent migration, erosion, perforation, amongst others.  Radiology resident recommends starting chest x-ray, so I have ordered this.  Chest x-ray today was read by radiology as unchanged appearance of esophageal stent, no pneumomediastinum to suggest perforation. There is no  acute airspace disease. Her CBC today is also unremarkable, unchanged really from yesterday, white count today is 7.1.  We did get a CBC today and we will be able to compare this with yesterday's as CBC yesterday showed a normal white count of 8.5.  Today's CBC shows WBC of 7.1.  Patient received fluids and Toradol here in the Emergency Department.    Patient had received Toradol and Tylenol here in the ED. I do not suspect any additional problem at this point. Patient has now been seen five times in the past eight days in emergency departments for this chest pain. She has been thoroughly evaluated, has had multiple blood tests and imaging studies. She had a CT scan done from an outside hospital as recently as the 19th of October which did not show any problem.    Patient will be discharged at this time and instructed to follow up with thoracic surgery.      This part of the medical record was transcribed by Marcos Askew, Medical Scribe, from a dictation done by Jolly Ravi MD.     I have reviewed the nursing notes.    I have reviewed the findings, diagnosis, plan and need for follow up with the patient.    Discharge Medication List as of 10/26/2019  5:50 PM          Final diagnoses:   Chest pain, unspecified type   Borderline personality disorder (H)     I, Alma Lala, am serving as a trained medical scribe to document services personally performed by Jolly Ravi MD, based on the provider's statements to me.      IJolly MD, was physically present and have reviewed and verified the accuracy of this note documented by Alma Lala.     10/26/2019   [facility], [place], EMERGENCY DEPARTMENT     Jolly Ravi MD  10/27/19 0014

## 2019-10-26 NOTE — ED AVS SNAPSHOT
[facility], [place], Emergency Department  [address] [place] [address]  Phone:  [phone]                                    Nevin Alvarado   MRN: [id number]    Department:  [facility], Emergency Department   Date of Visit:  10/26/2019           After Visit Summary Signature Page    I have received my discharge instructions, and my questions have been answered. I have discussed any challenges I see with this plan with the nurse or doctor.    ..........................................................................................................................................  Patient/Patient Representative Signature      ..........................................................................................................................................  Patient Representative Print Name and Relationship to Patient    ..................................................               ................................................  Date                                   Time    ..........................................................................................................................................  Reviewed by Signature/Title    ...................................................              ..............................................  Date                                               Time          22EPIC Rev 08/18

## 2019-10-26 NOTE — PROGRESS NOTES
Emergency Social Work Services Note    Date of  Intervention: 10/26/19  Last Emergency Department Visit:  10/25/19  Care Plan: Extensive care plan related to history of self-injurious behavior including ingestion of foreign bodies and significant mental health hx. please see problem list for full ED treatment plan.  Collaborated with: ED MD, chart review, patient, ED RN    Data: Nevin Flores is a 28-year-old  female with extensive mental health history including self-injurious behavior, multiple medical interventions related to ingestion of foreign bodies.     ED MD consulted SW to assess Nevin's living situation.  EMS reports Nevin resides in a group home however demographics list an apartment in [place].  Nevin told ED MD that she lives alone in her own apartment.    Intervention: ED SW met with Nevin for discussion about her current situation.  She is currently on a 1: 1 related to history of self-injurious behaviors.  She answers SW questions although appears guarded and provides little additional information.  She has fixed eye contact. She is wondering why we are asking about her living situation.     Nevin reports that she lives alone in her own apartment.  She notes that her sister resides in the same building.  Discussed that she appears to have a history of mental illness civil commitment 12/14-12/16.  Nevin denied any recollection of this commitment other than it was not effective in meeting her needs.  She confirms she has the following supportive services:    [facility] weekly nursing visits to set up rx's  [facility]  [facility] (no current ph# in chart, main [phone] but unable to reach anyone since it is the weekend)  ILS worker 2 times a week 7 AM to 5 PM  Mental health  through [facility]  Behavioral analyst therapist to review challenges.  PCP through [facility] (incl RN case mgmt wkly via phone and in clinic monthly,  Ana Laura Carnes [phone])  [facility] with next follow-up appointment 11/20/2019    Assessment:  extensive MH issues incl self-injurious behavior    Plan:    Anticipated Disposition:  Home with services    Barriers to d/c plan:  TBD - Pending patient's progress and further recommendation.    Follow Up:  ED MD alerted re living situation.  Unable to reach any of Nevin's  formal supports given it is a weekend.  It appears Nevin is still residing in independent living per her report. Obtained copy of EMS run sheet which matches addresses on her demographics which appears to be an independent apartment.  This does not appear to be a group home setting.    YORDAN Elkins, [facility]  Social Work Services, Emergency Dept [facility]  Pager: [phone] Mon-Sat 9 am - 9 pm, on-call/after hours pager [phone]

## 2019-10-26 NOTE — ED NOTES
Bed: ED34  Expected date:   Expected time:   Means of arrival:   Comments:  28F abdominal pain, yellow in 10 minutes

## 2019-10-26 NOTE — DISCHARGE INSTRUCTIONS
You have been seen in the emergency department today for chest pain.  Your chest x-ray and blood work are normal.  Your symptoms are likely related to your stent.  You need to follow-up with the thoracic surgery clinic for this.  You can use Tylenol and ibuprofen as needed for pain.  All prescriptions for any other medications need to come from your clinic.

## 2019-10-29 ENCOUNTER — PRE VISIT (OUTPATIENT)
Dept: ONCOLOGY | Facility: CLINIC | Age: 29
End: 2019-10-29

## 2019-11-01 ENCOUNTER — TELEPHONE (OUTPATIENT)
Dept: SURGERY | Facility: CLINIC | Age: 29
End: 2019-11-01

## 2019-11-01 NOTE — TELEPHONE ENCOUNTER
"I called Nevin to discuss her esophageal stent and need to wait for another month before removing this.   I told her this was the decision from Dr. Espana to give her esophagus the best chance to heal and hopefully not need another stent placed.   She was angry and asked \"what if I want the stent out now?\"    I explained that she would need to find another provider to remove this stent, that Dr. Espana was firm in wanting to wait a month before removal.   We discussed her difficulty with solid foods and I agreed this is uncomfortable and that she may need a soft diet until the stent comes out.       I told her our OR  will be in touch in the next few days to arrange this procedure in early December.  "

## 2019-11-02 ENCOUNTER — TRANSFERRED RECORDS (OUTPATIENT)
Dept: HEALTH INFORMATION MANAGEMENT | Facility: CLINIC | Age: 29
End: 2019-11-02

## 2019-11-02 LAB
CREAT SERPL-MCNC: 0.63 MG/DL (ref 0.55–1.02)
GFR SERPL CREATININE-BSD FRML MDRD: >60 ML/MIN/1.73M2
GLUCOSE SERPL-MCNC: 105 MG/DL (ref 70–100)
POTASSIUM SERPL-SCNC: 3.6 MMOL/L (ref 3.5–5.1)

## 2019-11-03 ENCOUNTER — APPOINTMENT (OUTPATIENT)
Dept: GENERAL RADIOLOGY | Facility: CLINIC | Age: 29
End: 2019-11-03
Attending: EMERGENCY MEDICINE
Payer: COMMERCIAL

## 2019-11-03 ENCOUNTER — HOSPITAL ENCOUNTER (OUTPATIENT)
Facility: CLINIC | Age: 29
Setting detail: OBSERVATION
Discharge: HOME | End: 2019-11-05
Attending: EMERGENCY MEDICINE | Admitting: PEDIATRICS
Payer: COMMERCIAL

## 2019-11-03 DIAGNOSIS — K22.3 ESOPHAGEAL PERFORATION: ICD-10-CM

## 2019-11-03 DIAGNOSIS — Z87.821: ICD-10-CM

## 2019-11-03 DIAGNOSIS — R13.10 DYSPHAGIA, UNSPECIFIED TYPE: ICD-10-CM

## 2019-11-03 DIAGNOSIS — R13.19 ESOPHAGEAL DYSPHAGIA: Primary | ICD-10-CM

## 2019-11-03 LAB
ANION GAP SERPL CALCULATED.3IONS-SCNC: 6 MMOL/L (ref 3–14)
BASOPHILS # BLD AUTO: 0.1 10E9/L (ref 0–0.2)
BASOPHILS NFR BLD AUTO: 0.8 %
BUN SERPL-MCNC: 14 MG/DL (ref 7–30)
CALCIUM SERPL-MCNC: 9.2 MG/DL (ref 8.5–10.1)
CHLORIDE SERPL-SCNC: 108 MMOL/L (ref 94–109)
CO2 SERPL-SCNC: 25 MMOL/L (ref 20–32)
CREAT SERPL-MCNC: 0.7 MG/DL (ref 0.52–1.04)
DIFFERENTIAL METHOD BLD: NORMAL
EOSINOPHIL # BLD AUTO: 0.5 10E9/L (ref 0–0.7)
EOSINOPHIL NFR BLD AUTO: 5.7 %
ERYTHROCYTE [DISTWIDTH] IN BLOOD BY AUTOMATED COUNT: 14 % (ref 10–15)
GFR SERPL CREATININE-BSD FRML MDRD: >90 ML/MIN/{1.73_M2}
GLUCOSE BLDC GLUCOMTR-MCNC: 99 MG/DL (ref 70–99)
GLUCOSE SERPL-MCNC: 114 MG/DL (ref 70–99)
HCT VFR BLD AUTO: 38.5 % (ref 35–47)
HGB BLD-MCNC: 12.2 G/DL (ref 11.7–15.7)
IMM GRANULOCYTES # BLD: 0 10E9/L (ref 0–0.4)
IMM GRANULOCYTES NFR BLD: 0.3 %
LYMPHOCYTES # BLD AUTO: 1.7 10E9/L (ref 0.8–5.3)
LYMPHOCYTES NFR BLD AUTO: 21.6 %
MCH RBC QN AUTO: 28.3 PG (ref 26.5–33)
MCHC RBC AUTO-ENTMCNC: 31.7 G/DL (ref 31.5–36.5)
MCV RBC AUTO: 89 FL (ref 78–100)
MONOCYTES # BLD AUTO: 0.7 10E9/L (ref 0–1.3)
MONOCYTES NFR BLD AUTO: 8.7 %
NEUTROPHILS # BLD AUTO: 5 10E9/L (ref 1.6–8.3)
NEUTROPHILS NFR BLD AUTO: 62.9 %
NRBC # BLD AUTO: 0 10*3/UL
NRBC BLD AUTO-RTO: 0 /100
PLATELET # BLD AUTO: 304 10E9/L (ref 150–450)
POTASSIUM SERPL-SCNC: 3.4 MMOL/L (ref 3.4–5.3)
RBC # BLD AUTO: 4.31 10E12/L (ref 3.8–5.2)
SODIUM SERPL-SCNC: 139 MMOL/L (ref 133–144)
WBC # BLD AUTO: 7.9 10E9/L (ref 4–11)

## 2019-11-03 PROCEDURE — 96374 THER/PROPH/DIAG INJ IV PUSH: CPT | Performed by: EMERGENCY MEDICINE

## 2019-11-03 PROCEDURE — 99285 EMERGENCY DEPT VISIT HI MDM: CPT | Mod: Z6 | Performed by: EMERGENCY MEDICINE

## 2019-11-03 PROCEDURE — 80048 BASIC METABOLIC PNL TOTAL CA: CPT | Performed by: EMERGENCY MEDICINE

## 2019-11-03 PROCEDURE — 25000128 H RX IP 250 OP 636: Performed by: EMERGENCY MEDICINE

## 2019-11-03 PROCEDURE — 96375 TX/PRO/DX INJ NEW DRUG ADDON: CPT | Performed by: EMERGENCY MEDICINE

## 2019-11-03 PROCEDURE — G0378 HOSPITAL OBSERVATION PER HR: HCPCS

## 2019-11-03 PROCEDURE — 00000146 ZZHCL STATISTIC GLUCOSE BY METER IP

## 2019-11-03 PROCEDURE — 25800030 ZZH RX IP 258 OP 636: Performed by: STUDENT IN AN ORGANIZED HEALTH CARE EDUCATION/TRAINING PROGRAM

## 2019-11-03 PROCEDURE — 85025 COMPLETE CBC W/AUTO DIFF WBC: CPT | Performed by: EMERGENCY MEDICINE

## 2019-11-03 PROCEDURE — 25000132 ZZH RX MED GY IP 250 OP 250 PS 637: Performed by: STUDENT IN AN ORGANIZED HEALTH CARE EDUCATION/TRAINING PROGRAM

## 2019-11-03 PROCEDURE — 71046 X-RAY EXAM CHEST 2 VIEWS: CPT

## 2019-11-03 PROCEDURE — 96376 TX/PRO/DX INJ SAME DRUG ADON: CPT | Performed by: EMERGENCY MEDICINE

## 2019-11-03 PROCEDURE — 25000128 H RX IP 250 OP 636: Performed by: STUDENT IN AN ORGANIZED HEALTH CARE EDUCATION/TRAINING PROGRAM

## 2019-11-03 PROCEDURE — 99285 EMERGENCY DEPT VISIT HI MDM: CPT | Mod: 25 | Performed by: EMERGENCY MEDICINE

## 2019-11-03 PROCEDURE — 25000132 ZZH RX MED GY IP 250 OP 250 PS 637: Performed by: FAMILY MEDICINE

## 2019-11-03 PROCEDURE — 96375 TX/PRO/DX INJ NEW DRUG ADDON: CPT | Performed by: FAMILY MEDICINE

## 2019-11-03 RX ORDER — METFORMIN HCL 500 MG
500 TABLET, EXTENDED RELEASE 24 HR ORAL
Status: DISCONTINUED | OUTPATIENT
Start: 2019-11-03 | End: 2019-11-05 | Stop reason: HOSPADM

## 2019-11-03 RX ORDER — ONDANSETRON 4 MG/1
4 TABLET, ORALLY DISINTEGRATING ORAL EVERY 6 HOURS PRN
Status: DISCONTINUED | OUTPATIENT
Start: 2019-11-03 | End: 2019-11-05 | Stop reason: HOSPADM

## 2019-11-03 RX ORDER — MORPHINE SULFATE 2 MG/ML
2 INJECTION, SOLUTION INTRAMUSCULAR; INTRAVENOUS
Status: DISCONTINUED | OUTPATIENT
Start: 2019-11-03 | End: 2019-11-03

## 2019-11-03 RX ORDER — GABAPENTIN 300 MG/1
600 CAPSULE ORAL 2 TIMES DAILY
Status: DISCONTINUED | OUTPATIENT
Start: 2019-11-03 | End: 2019-11-03

## 2019-11-03 RX ORDER — DEXTROSE MONOHYDRATE, SODIUM CHLORIDE, AND POTASSIUM CHLORIDE 50; 1.49; 4.5 G/1000ML; G/1000ML; G/1000ML
INJECTION, SOLUTION INTRAVENOUS CONTINUOUS
Status: DISCONTINUED | OUTPATIENT
Start: 2019-11-03 | End: 2019-11-05 | Stop reason: HOSPADM

## 2019-11-03 RX ORDER — HEPARIN SODIUM,PORCINE 10 UNIT/ML
5-10 VIAL (ML) INTRAVENOUS
Status: DISCONTINUED | OUTPATIENT
Start: 2019-11-03 | End: 2019-11-05 | Stop reason: HOSPADM

## 2019-11-03 RX ORDER — PROCHLORPERAZINE 25 MG
25 SUPPOSITORY, RECTAL RECTAL EVERY 12 HOURS PRN
Status: DISCONTINUED | OUTPATIENT
Start: 2019-11-03 | End: 2019-11-05 | Stop reason: HOSPADM

## 2019-11-03 RX ORDER — ONDANSETRON 2 MG/ML
4 INJECTION INTRAMUSCULAR; INTRAVENOUS EVERY 6 HOURS PRN
Status: DISCONTINUED | OUTPATIENT
Start: 2019-11-03 | End: 2019-11-05 | Stop reason: HOSPADM

## 2019-11-03 RX ORDER — ACETAMINOPHEN 325 MG/1
650 TABLET ORAL 4 TIMES DAILY
Status: DISCONTINUED | OUTPATIENT
Start: 2019-11-03 | End: 2019-11-03

## 2019-11-03 RX ORDER — LIDOCAINE 40 MG/G
CREAM TOPICAL
Status: DISCONTINUED | OUTPATIENT
Start: 2019-11-03 | End: 2019-11-05 | Stop reason: HOSPADM

## 2019-11-03 RX ORDER — ONDANSETRON 2 MG/ML
4 INJECTION INTRAMUSCULAR; INTRAVENOUS ONCE
Status: COMPLETED | OUTPATIENT
Start: 2019-11-03 | End: 2019-11-03

## 2019-11-03 RX ORDER — HYDROMORPHONE HYDROCHLORIDE 2 MG/1
2 TABLET ORAL EVERY 6 HOURS PRN
Status: DISCONTINUED | OUTPATIENT
Start: 2019-11-03 | End: 2019-11-05

## 2019-11-03 RX ORDER — ACETAMINOPHEN 650 MG/1
650 SUPPOSITORY RECTAL EVERY 4 HOURS PRN
Status: DISCONTINUED | OUTPATIENT
Start: 2019-11-03 | End: 2019-11-05

## 2019-11-03 RX ORDER — HEPARIN SODIUM (PORCINE) LOCK FLUSH IV SOLN 100 UNIT/ML 100 UNIT/ML
5 SOLUTION INTRAVENOUS
Status: DISCONTINUED | OUTPATIENT
Start: 2019-11-03 | End: 2019-11-05 | Stop reason: HOSPADM

## 2019-11-03 RX ORDER — HEPARIN SODIUM,PORCINE 10 UNIT/ML
5-10 VIAL (ML) INTRAVENOUS EVERY 24 HOURS
Status: DISCONTINUED | OUTPATIENT
Start: 2019-11-03 | End: 2019-11-05 | Stop reason: HOSPADM

## 2019-11-03 RX ORDER — BUSPIRONE HYDROCHLORIDE 10 MG/1
10 TABLET ORAL 2 TIMES DAILY
Status: DISCONTINUED | OUTPATIENT
Start: 2019-11-03 | End: 2019-11-05 | Stop reason: HOSPADM

## 2019-11-03 RX ORDER — NYSTATIN 100000/ML
1000000 SUSPENSION, ORAL (FINAL DOSE FORM) ORAL 4 TIMES DAILY
Status: DISCONTINUED | OUTPATIENT
Start: 2019-11-03 | End: 2019-11-05 | Stop reason: HOSPADM

## 2019-11-03 RX ORDER — PROCHLORPERAZINE MALEATE 10 MG
10 TABLET ORAL EVERY 6 HOURS PRN
Status: DISCONTINUED | OUTPATIENT
Start: 2019-11-03 | End: 2019-11-05 | Stop reason: HOSPADM

## 2019-11-03 RX ORDER — NALOXONE HYDROCHLORIDE 0.4 MG/ML
.1-.4 INJECTION, SOLUTION INTRAMUSCULAR; INTRAVENOUS; SUBCUTANEOUS
Status: DISCONTINUED | OUTPATIENT
Start: 2019-11-03 | End: 2019-11-05 | Stop reason: HOSPADM

## 2019-11-03 RX ORDER — ALBUTEROL SULFATE 0.83 MG/ML
2.5 SOLUTION RESPIRATORY (INHALATION) EVERY 6 HOURS PRN
Status: DISCONTINUED | OUTPATIENT
Start: 2019-11-03 | End: 2019-11-05 | Stop reason: HOSPADM

## 2019-11-03 RX ORDER — ONDANSETRON 2 MG/ML
4 INJECTION INTRAMUSCULAR; INTRAVENOUS EVERY 6 HOURS PRN
Status: DISCONTINUED | OUTPATIENT
Start: 2019-11-03 | End: 2019-11-03

## 2019-11-03 RX ORDER — MORPHINE SULFATE 4 MG/ML
4 INJECTION, SOLUTION INTRAMUSCULAR; INTRAVENOUS ONCE
Status: COMPLETED | OUTPATIENT
Start: 2019-11-03 | End: 2019-11-03

## 2019-11-03 RX ORDER — ACETAMINOPHEN 325 MG/1
650 TABLET ORAL EVERY 4 HOURS PRN
Status: DISCONTINUED | OUTPATIENT
Start: 2019-11-03 | End: 2019-11-03

## 2019-11-03 RX ORDER — HYDROMORPHONE HYDROCHLORIDE 1 MG/ML
0.5 INJECTION, SOLUTION INTRAMUSCULAR; INTRAVENOUS; SUBCUTANEOUS ONCE
Status: COMPLETED | OUTPATIENT
Start: 2019-11-03 | End: 2019-11-03

## 2019-11-03 RX ORDER — DESVENLAFAXINE 25 MG/1
100 TABLET, EXTENDED RELEASE ORAL DAILY
Status: DISCONTINUED | OUTPATIENT
Start: 2019-11-03 | End: 2019-11-05 | Stop reason: HOSPADM

## 2019-11-03 RX ORDER — TOPIRAMATE 25 MG/1
100 TABLET, FILM COATED ORAL AT BEDTIME
Status: DISCONTINUED | OUTPATIENT
Start: 2019-11-03 | End: 2019-11-05 | Stop reason: HOSPADM

## 2019-11-03 RX ORDER — GABAPENTIN 300 MG/1
600 CAPSULE ORAL 3 TIMES DAILY
Status: DISCONTINUED | OUTPATIENT
Start: 2019-11-03 | End: 2019-11-04

## 2019-11-03 RX ORDER — CLONIDINE HYDROCHLORIDE 0.1 MG/1
0.1 TABLET ORAL 2 TIMES DAILY
Status: DISCONTINUED | OUTPATIENT
Start: 2019-11-03 | End: 2019-11-05 | Stop reason: HOSPADM

## 2019-11-03 RX ADMIN — CLONIDINE HYDROCHLORIDE 0.1 MG: 0.1 TABLET ORAL at 11:11

## 2019-11-03 RX ADMIN — ACETAMINOPHEN 650 MG: 325 SOLUTION ORAL at 16:24

## 2019-11-03 RX ADMIN — GABAPENTIN 600 MG: 300 CAPSULE ORAL at 10:55

## 2019-11-03 RX ADMIN — NYSTATIN 1000000 UNITS: 500000 SUSPENSION ORAL at 21:32

## 2019-11-03 RX ADMIN — ONDANSETRON 4 MG: 2 INJECTION INTRAMUSCULAR; INTRAVENOUS at 05:53

## 2019-11-03 RX ADMIN — PROCHLORPERAZINE EDISYLATE 10 MG: 5 INJECTION, SOLUTION INTRAMUSCULAR; INTRAVENOUS at 17:36

## 2019-11-03 RX ADMIN — CLONIDINE HYDROCHLORIDE 0.1 MG: 0.1 TABLET ORAL at 21:33

## 2019-11-03 RX ADMIN — HYDROMORPHONE HYDROCHLORIDE 2 MG: 2 TABLET ORAL at 09:08

## 2019-11-03 RX ADMIN — HYDROMORPHONE HYDROCHLORIDE 2 MG: 2 TABLET ORAL at 16:24

## 2019-11-03 RX ADMIN — OMEPRAZOLE 20 MG: KIT at 11:14

## 2019-11-03 RX ADMIN — METFORMIN HYDROCHLORIDE 500 MG: 500 TABLET, EXTENDED RELEASE ORAL at 16:24

## 2019-11-03 RX ADMIN — GABAPENTIN 600 MG: 300 CAPSULE ORAL at 21:33

## 2019-11-03 RX ADMIN — BUSPIRONE HYDROCHLORIDE 10 MG: 10 TABLET ORAL at 21:32

## 2019-11-03 RX ADMIN — BUSPIRONE HYDROCHLORIDE 10 MG: 10 TABLET ORAL at 10:58

## 2019-11-03 RX ADMIN — POTASSIUM CHLORIDE, DEXTROSE MONOHYDRATE AND SODIUM CHLORIDE: 150; 5; 450 INJECTION, SOLUTION INTRAVENOUS at 12:58

## 2019-11-03 RX ADMIN — TOPIRAMATE 100 MG: 25 TABLET, FILM COATED ORAL at 21:33

## 2019-11-03 RX ADMIN — MORPHINE SULFATE 4 MG: 4 INJECTION, SOLUTION INTRAMUSCULAR; INTRAVENOUS at 04:55

## 2019-11-03 RX ADMIN — NYSTATIN 1000000 UNITS: 500000 SUSPENSION ORAL at 16:24

## 2019-11-03 RX ADMIN — DESVENLAFAXINE 100 MG: 50 TABLET, FILM COATED, EXTENDED RELEASE ORAL at 11:12

## 2019-11-03 RX ADMIN — LURASIDONE HYDROCHLORIDE 60 MG: 40 TABLET, FILM COATED ORAL at 21:34

## 2019-11-03 RX ADMIN — HYDROMORPHONE HYDROCHLORIDE 0.5 MG: 1 INJECTION, SOLUTION INTRAMUSCULAR; INTRAVENOUS; SUBCUTANEOUS at 05:53

## 2019-11-03 RX ADMIN — ACETAMINOPHEN 650 MG: 325 SOLUTION ORAL at 21:31

## 2019-11-03 RX ADMIN — HYDROMORPHONE HYDROCHLORIDE 2 MG: 2 TABLET ORAL at 22:33

## 2019-11-03 RX ADMIN — ACETAMINOPHEN 650 MG: 325 SOLUTION ORAL at 11:00

## 2019-11-03 RX ADMIN — GABAPENTIN 600 MG: 300 CAPSULE ORAL at 14:42

## 2019-11-03 RX ADMIN — NYSTATIN 1000000 UNITS: 500000 SUSPENSION ORAL at 11:01

## 2019-11-03 ASSESSMENT — ENCOUNTER SYMPTOMS
APPETITE CHANGE: 0
TROUBLE SWALLOWING: 0
EYES NEGATIVE: 1
NAUSEA: 0
CONSTIPATION: 0
BACK PAIN: 1
ABDOMINAL DISTENTION: 0
HEADACHES: 0
EYE REDNESS: 0
TROUBLE SWALLOWING: 1
BLOOD IN STOOL: 0
DYSPHORIC MOOD: 1
AGITATION: 0
NEUROLOGICAL NEGATIVE: 1
DIARRHEA: 0
ABDOMINAL PAIN: 1
ABDOMINAL PAIN: 0
NECK STIFFNESS: 0
COLOR CHANGE: 0
CARDIOVASCULAR NEGATIVE: 1
SORE THROAT: 1
SHORTNESS OF BREATH: 0
NERVOUS/ANXIOUS: 1
CONFUSION: 0
DIFFICULTY URINATING: 0
FEVER: 0
FATIGUE: 0
RESPIRATORY NEGATIVE: 1
VOMITING: 0
NECK PAIN: 0
ARTHRALGIAS: 0
ACTIVITY CHANGE: 0

## 2019-11-03 NOTE — ED NOTES
Bed: ED21  Expected date:   Expected time:   Means of arrival:   Comments:  [id number] transfer from [facility] for an esophageal tear and stent

## 2019-11-03 NOTE — PLAN OF CARE
Status: Pt here from ED after receiving report from previous ED with throat and abdominal s/p surgery 10/16 to esophagus with stent placement. Pt has extensive psyhc Hx and had surgery d/t ingestion of paper clips- room emptied out  of sharp objects.  Vitals: AVSS on RA  Neuros: A&Ox4. Neuros intact.   Resp/trach: LS CTA  Diet: Pt ate 75% of clear diet but then c/o of nausea- zofran given.  Bowel status: BS+x4  : VDSP in BR  Skin: Intact  Pain: Abdominal and throat pain moderately controlled with PRN Diluadid PO- per MD will not order it IV  Activity: Up Ax1 with gb and walker  Social: No visitors  Plan: Will continue to monitor and follow with POC.

## 2019-11-03 NOTE — CONSULTS
[facility] Surgery Consultation    Nevin Alvarado MRN# [id number]   Age: 28 year old YOB: 1990     Date of Admission:  11/3/2019    Date of Consult:   11/03/19    Reason for consult: Abdominal pain       Requesting service: ER; requesting provider: Dr. Frias                   Assessment and Plan:   Assessment:     28 year old female with history of esophageal perforation 2/2 foreign body ingestion s/p stent placement, presents with acute on chronic odynophagia with liquids and soft foods.  CT at outside hospital non-concerning for displacement of esophageal stent or paraesophageal fluid collection.        Plan:     Recommend admission to medicine for pain control  Thoracic surgery will follow along and discussed with staff if EGD needed this admission.    Seen with thoracic surgery fellow, Dr. Ramirez, who will staff with Dr. De Sanches MD  PGY-3, General Surgery  [phone]            Chief Complaint:     Admitting Diagnosis:   1. Dysphagia, unspecified type             History of Present Illness:     Ms. Alvarado is a 28-year-old woman with a history of depression, foreign body ingestion, esophageal perforation secondary to foreign body ingestion status post esophageal stent placement on 10/6/19 with Dr. Espana.  She reports in the past few days increased odynophagia with liquids and soft foods in the region of the lower neck associated with occasional regurgitation after sensation of food sticking.  She further reports discomfort in the left shoulder and left upper quadrant.  She denies fever, difficulty initiating swallow, choking, early satiety, changes in bowel habits, or ingestion of foreign bodies.             Past Medical History:     Past Medical History:   Diagnosis Date     ADD (attention deficit disorder)      Anorexia nervosa with bulimia     history of; on Topamax     Anxiety      Borderline personality disorder (H)      Depression      Depressive disorder       H/O self-harm      Lives in independent group home     due to debilitating mental illness     Migraine without aura     no known triggers; on Topamax bid and Imitrex PRN     Morbid obesity (H)      PTSD (post-traumatic stress disorder)      Rectal foreign body - Recurrent issue, self placed      Swallowed foreign body - Recurrent issue, self placed              Past Surgical History:     Past Surgical History:   Procedure Laterality Date     COMBINED ESOPHAGOSCOPY, GASTROSCOPY, DUODENOSCOPY (EGD), REPLACE ESOPHAGEAL STENT N/A 10/9/2019    Procedure: Upper Endoscopy with Suture Placement;  Surgeon: Zurdo Ramirez MD;  Location: [facility] OR     ESOPHAGOSCOPY, GASTROSCOPY, DUODENOSCOPY (EGD), COMBINED N/A 3/9/2017    Procedure: COMBINED ESOPHAGOSCOPY, GASTROSCOPY, DUODENOSCOPY (EGD), REMOVE FOREIGN BODY;  Surgeon: Avis Guzmán MD;  Location: [facility] OR     ESOPHAGOSCOPY, GASTROSCOPY, DUODENOSCOPY (EGD), COMBINED N/A 4/20/2017    Procedure: COMBINED ESOPHAGOSCOPY, GASTROSCOPY, DUODENOSCOPY (EGD), REMOVE FOREIGN BODY;  EGD removal Foregin body;  Surgeon: Lokesh Paula MD;  Location: [facility]     ESOPHAGOSCOPY, GASTROSCOPY, DUODENOSCOPY (EGD), COMBINED N/A 6/12/2017    Procedure: COMBINED ESOPHAGOSCOPY, GASTROSCOPY, DUODENOSCOPY (EGD);  COMBINED ESOPHAGOSCOPY, GASTROSCOPY, DUODENOSCOPY (EGD) [[id number]]attempted removal of foreign body;  Surgeon: Pamela Perez MD;  Location: [facility]     ESOPHAGOSCOPY, GASTROSCOPY, DUODENOSCOPY (EGD), COMBINED N/A 6/9/2017    Procedure: COMBINED ESOPHAGOSCOPY, GASTROSCOPY, DUODENOSCOPY (EGD), REMOVE FOREIGN BODY;  Esophagoscopy, Gastroscopy, Duodenoscopy, Removal of Foreign Body;  Surgeon: Dejon Marsh MD;  Location: [facility]     ESOPHAGOSCOPY, GASTROSCOPY, DUODENOSCOPY (EGD), COMBINED N/A 1/6/2018    Procedure: COMBINED ESOPHAGOSCOPY, GASTROSCOPY, DUODENOSCOPY (EGD), REMOVE FOREIGN BODY;  COMBINED ESOPHAGOSCOPY, GASTROSCOPY, DUODENOSCOPY (EGD) [by pascal  net and snare with profol sedation;  Surgeon: Feliciano Emmanuel MD;  Location:  GI     ESOPHAGOSCOPY, GASTROSCOPY, DUODENOSCOPY (EGD), COMBINED N/A 3/19/2018    Procedure: COMBINED ESOPHAGOSCOPY, GASTROSCOPY, DUODENOSCOPY (EGD), REMOVE FOREIGN BODY;   Esophagodscopy, Gastroscopy, Duodenoscopy,Foreign Body Removal;  Surgeon: Brice Guzmán MD;  Location: [facility]     ESOPHAGOSCOPY, GASTROSCOPY, DUODENOSCOPY (EGD), COMBINED N/A 4/16/2018    Procedure: COMBINED ESOPHAGOSCOPY, GASTROSCOPY, DUODENOSCOPY (EGD), REMOVE FOREIGN BODY;  Esophagogastroduodenoscopy  Foreign Body Removal X 2;  Surgeon: Royer Moise MD;  Location: [facility]     ESOPHAGOSCOPY, GASTROSCOPY, DUODENOSCOPY (EGD), COMBINED N/A 6/1/2018    Procedure: COMBINED ESOPHAGOSCOPY, GASTROSCOPY, DUODENOSCOPY (EGD), REMOVE FOREIGN BODY;  COMBINED ESOPHAGOSCOPY, GASTROSCOPY, DUODENOSCOPY with removal of foreign body, propofol sedation by anesthesia;  Surgeon: Brice Martinez MD;  Location:  GI     ESOPHAGOSCOPY, GASTROSCOPY, DUODENOSCOPY (EGD), COMBINED N/A 7/25/2018    Procedure: COMBINED ESOPHAGOSCOPY, GASTROSCOPY, DUODENOSCOPY (EGD), REMOVE FOREIGN BODY;;  Surgeon: Candy Castelan MD;  Location:  GI     ESOPHAGOSCOPY, GASTROSCOPY, DUODENOSCOPY (EGD), COMBINED N/A 7/28/2018    Procedure: COMBINED ESOPHAGOSCOPY, GASTROSCOPY, DUODENOSCOPY (EGD), REMOVE FOREIGN BODY;  COMBINED ESOPHAGOSCOPY, GASTROSCOPY, DUODENOSCOPY (EGD), REMOVE FOREIGN BODY;  Surgeon: Brice Guzmán MD;  Location: [facility]     ESOPHAGOSCOPY, GASTROSCOPY, DUODENOSCOPY (EGD), COMBINED N/A 7/31/2018    Procedure: COMBINED ESOPHAGOSCOPY, GASTROSCOPY, DUODENOSCOPY (EGD);  COMBINED ESOPHAGOSCOPY, GASTROSCOPY, DUODENOSCOPY (EGD) TO REMOVE FOREIGN BODY;  Surgeon: Lokesh Paula MD;  Location: [facility]     ESOPHAGOSCOPY, GASTROSCOPY, DUODENOSCOPY (EGD), COMBINED N/A 8/4/2018    Procedure: COMBINED ESOPHAGOSCOPY, GASTROSCOPY, DUODENOSCOPY (EGD), REMOVE FOREIGN BODY;    combined esophagoscopy, gastroscopy, duodenoscopy, REMOVE FOREIGN BODY ;  Surgeon: Lokesh Paula MD;  Location: [facility]     ESOPHAGOSCOPY, GASTROSCOPY, DUODENOSCOPY (EGD), COMBINED N/A 10/6/2019    Procedure: ESOPHAGOGASTRODUODENOSCOPY (EGD) with fireign body removal x2, esophageal stent placement with floroscopy;  Surgeon: Timoteo Espana MD;  Location: [facility]     EXAM UNDER ANESTHESIA ANUS N/A 1/10/2017    Procedure: EXAM UNDER ANESTHESIA ANUS;  Surgeon: Annmarie Haynes MD;  Location: [facility]     EXAM UNDER ANESTHESIA RECTUM N/A 7/19/2018    Procedure: EXAM UNDER ANESTHESIA RECTUM;  EXAM UNDER ANESTHESIA, REMOVAL OF RECTAL FOREIGN BODY;  Surgeon: Annmarie Haynes MD;  Location: [facility]     HC REMOVE FECAL IMPACTION OR FB W ANESTHESIA N/A 12/18/2016    Procedure: REMOVE FECAL IMPACTION/FOREIGN BODY UNDER ANESTHESIA;  Surgeon: Soham Cano MD;  Location:  OR     KNEE SURGERY - removed a small tissue mass from knee Right      LAPAROSCOPIC ABLATION ENDOMETRIOSIS       LAPAROTOMY EXPLORATORY N/A 1/10/2017    Procedure: LAPAROTOMY EXPLORATORY;  Surgeon: Annmarie Haynes MD;  Location: [facility]     LAPAROTOMY EXPLORATORY  09/11/2019    Manual manipulation of bowel to remove pill bottle in rectum     lymph nodes removed from neck; benign  age 7     MAMMOPLASTY REDUCTION Bilateral      RELEASE CARPAL TUNNEL Bilateral      SIGMOIDOSCOPY FLEXIBLE N/A 1/10/2017    Procedure: SIGMOIDOSCOPY FLEXIBLE;  Surgeon: Annmarie Haynes MD;  Location: [facility]     SIGMOIDOSCOPY FLEXIBLE N/A 5/8/2018    Procedure: SIGMOIDOSCOPY FLEXIBLE;  flex sig with foreign body removal using snare and rattooth forcep;  Surgeon: Soham Cano MD;  Location:  GI     SIGMOIDOSCOPY FLEXIBLE N/A 2/20/2019    Procedure: Exam under anesthesia Colonoscopy with attempted  removal of rectal foreign body;  Surgeon: Estrada Chávez MD;  Location: [facility] OR             Social History:     Social History     Tobacco Use      Smoking status: Never Smoker     Smokeless tobacco: Never Used   Substance Use Topics     Alcohol use: No     Alcohol/week: 0.0 standard drinks             Family History:     Family History   Problem Relation Age of Onset     Diabetes Type 2  Maternal Grandmother      Diabetes Type 2  Paternal Grandmother      Breast Cancer Paternal Grandmother      Cerebrovascular Disease Father 53     Myocardial Infarction No family hx of      Coronary Artery Disease Early Onset No family hx of      Ovarian Cancer No family hx of      Colon Cancer No family hx of                 Allergies:     Allergies   Allergen Reactions     Amoxicillin-Pot Clavulanate Other (See Comments), Rash and Swelling     PN: facial swelling, left side. Also had cortisone injection the same day as she started the Augmentin.  PN: facial swelling, left side. Also had cortisone injection the same day as she started the Augmentin.  Noted in downtime recovery of chart.       Penicillins Anaphylaxis     Blood-Group Specific Substance Other (See Comments)     Patient has an anti-Cw and non-specific antibodies. Blood product orders may be delayed. Draw one red top and two purple top tubes for all type/screen/crossmatch orders.     Hydrocodone Nausea and Vomiting     vomiting for days     Influenza Vaccines Other (See Comments)     Oseltamivir Hives     med stopped, PN: med stopped     Vancomycin Swelling     Vicodin [Hydrocodone-Acetaminophen] Nausea and Vomiting     Cephalosporins Rash     Lamotrigine Rash     Possibly associated with Lamictal.      Latex Rash             Medications:     No current facility-administered medications for this encounter.      Current Outpatient Medications   Medication Sig     acetaminophen (TYLENOL) 500 MG tablet Take 2 tablets (1,000 mg) by mouth every 6 hours as needed for pain or headache     albuterol (PROAIR HFA) 108 (90 Base) MCG/ACT inhaler Inhale 2 puffs into the lungs 4 times daily as needed      busPIRone (BUSPAR)  10 MG tablet Take 1 tablet (10 mg) by mouth twice daily     cloNIDine (CATAPRES) 0.1 MG tablet Take 0.1 mg by mouth 2 times daily      desvenlafaxine (PRISTIQ) 100 MG 24 hr tablet Take 1 tablet (100 mg) by mouth every morning     diphenhydrAMINE (BENADRYL) 25 MG capsule Take 1-2 capsules (25-50 mg) by mouth every 6 hours as needed for itching or sleep     docusate sodium (COLACE) 100 MG capsule Take 1 capsule (100 mg) by mouth twice daily as needed for constipation     gabapentin (NEURONTIN) 300 MG capsule Take 2 capsules (600 mg) by mouth three times daily     ibuprofen (ADVIL/MOTRIN) 100 MG/5ML suspension Take 20 mLs (400 mg) by mouth every 6 hours as needed for moderate pain     lurasidone (LATUDA) 60 MG TABS tablet Take 1 tablet (60 mg) by mouth at bedtime     menthol (ICY HOT) 5 % PTCH Apply 1 patch topically every 8 hours as needed for muscle soreness     metFORMIN (GLUCOPHAGE-XR) 500 MG 24 hr tablet Take 1 tablet (500 mg) by mouth daily     omeprazole (PRILOSEC) 2 mg/mL suspension Take 10 mLs (20 mg) by mouth every morning (before breakfast)     ondansetron (ZOFRAN-ODT) 8 MG ODT tab Take 1 tablet (8 mg) by mouth every 6 hours as needed for nausea or vomiting     polyethylene glycol (MIRALAX/GLYCOLAX) packet Take 17 g by mouth 2 times daily     topiramate (TOPAMAX) 100 MG tablet Take 1 tablet (100 mg) by mouth daily at bedtime     vitamin D3 (CHOLECALCIFEROL) 2000 units (50 mcg) tablet Take 1 tablet (2,000 units) by mouth daily               Review of Systems:   10 system review of systems performed otherwise negative except per HPI          Physical Exam:   All vitals have been reviewed  Temp:  [98.3  F (36.8  C)] 98.3  F (36.8  C)  Pulse:  [87-92] 92  Resp:  [18] 18  BP: (128-133)/(78-86) 128/78  SpO2:  [95 %-97 %] 95 %  No intake or output data in the 24 hours ending 11/03/19 0636    Physical Examination:   Vital Signs: /78   Pulse 92   Temp 98.3  F (36.8  C) (Oral)   Resp 18   Wt 113.4 kg (250  lb)   SpO2 95%   BMI 45.73 kg/m    GEN: alert, no distress, obese, apprehensive  EENT: normal  Chest: NLB on RA, regular rate  Abdomen: soft, moderately tender in LUQ, mild tender in epigastrium, non-distended, well-healed incisions  Extremities: WWP, no edema          Data:   All laboratory data reviewed    Labs/Imaging/Other:  Results for orders placed or performed during the hospital encounter of 11/03/19 (from the past 24 hour(s))   XR Chest 2 Views    Narrative    Preliminary report:  This is a preliminary resident interpretation. Full report to follow.        Impression    Impression: Stable position of esophageal stent.   Basic metabolic panel   Result Value Ref Range    Sodium 139 133 - 144 mmol/L    Potassium 3.4 3.4 - 5.3 mmol/L    Chloride 108 94 - 109 mmol/L    Carbon Dioxide 25 20 - 32 mmol/L    Anion Gap 6 3 - 14 mmol/L    Glucose 114 (H) 70 - 99 mg/dL    Urea Nitrogen 14 7 - 30 mg/dL    Creatinine 0.70 0.52 - 1.04 mg/dL    GFR Estimate >90 >60 mL/min/[1.73_m2]    GFR Estimate If Black >90 >60 mL/min/[1.73_m2]    Calcium 9.2 8.5 - 10.1 mg/dL   CBC with platelets differential   Result Value Ref Range    WBC 7.9 4.0 - 11.0 10e9/L    RBC Count 4.31 3.8 - 5.2 10e12/L    Hemoglobin 12.2 11.7 - 15.7 g/dL    Hematocrit 38.5 35.0 - 47.0 %    MCV 89 78 - 100 fl    MCH 28.3 26.5 - 33.0 pg    MCHC 31.7 31.5 - 36.5 g/dL    RDW 14.0 10.0 - 15.0 %    Platelet Count 304 150 - 450 10e9/L    Diff Method Automated Method     % Neutrophils 62.9 %    % Lymphocytes 21.6 %    % Monocytes 8.7 %    % Eosinophils 5.7 %    % Basophils 0.8 %    % Immature Granulocytes 0.3 %    Nucleated RBCs 0 0 /100    Absolute Neutrophil 5.0 1.6 - 8.3 10e9/L    Absolute Lymphocytes 1.7 0.8 - 5.3 10e9/L    Absolute Monocytes 0.7 0.0 - 1.3 10e9/L    Absolute Eosinophils 0.5 0.0 - 0.7 10e9/L    Absolute Basophils 0.1 0.0 - 0.2 10e9/L    Abs Immature Granulocytes 0.0 0 - 0.4 10e9/L    Absolute Nucleated RBC 0.0      EXAM: CT CHEST, ABDOMEN AND  PELVIS WITH IV CONTRAST  LOCATION: [facility]  DATE/TIME: 11/2/2019 8:36 PM    INDICATION: Pain. Esophageal stent. Recent fluid collection. Elevated CRP, ESR and white cell count.  COMPARISON:   1. CT pulmonary angiogram 10/18/2019.  2. CT abdomen and pelvis with IV contrast 10/30/2019.    TECHNIQUE: Helical thin-section CT scan of the chest, abdomen, and pelvis was performed following injection of IV contrast. Multiplanar reformats were obtained. Dose reduction techniques were used.   CONTRAST: 100 mL Omnipaque 350 IV.    FINDINGS:   LUNGS AND PLEURA: Motion artifact. Lungs are otherwise clear. No pleural effusion on either side. Previously seen nonspecific bibasilar air trapping has resolved.    MEDIASTINUM/AXILLAE: Esophageal stent. Metallic foreign bodies at the superior and inferior aspect of the esophageal stent (images 71-75, series 5), interval decrease in number of foreign bodies. Right IJ catheter tip in the upper right atrium. Normal caliber great vessels and the thoracic arch. Normal cardiac size. No pericardial effusion. Trachea is midline.    HEPATOBILIARY: Diffusely fatty infiltrated liver with additional focal fat in the usual location in the left lobe. Patent hepatic and portal veins. No calcified gallstones or biliary dilatation. No significant change.    PANCREAS: Normal.    SPLEEN: Normal.    ADRENAL GLANDS: Normal.    KIDNEYS/BLADDER: No stones or obstruction. Both kidneys are well perfused. Normal urinary bladder. Anteverted uterus containing an IUCD, well seated.    BOWEL: Gas and stool material within normal caliber colon. Normal appendix. Linear density in the sigmoid colon distally seen previously is no longer evident.    LYMPH NODES: No suspicious abdominopelvic adenopathy.    VASCULATURE: Normal caliber abdominal aorta and the IVC.    PELVIC ORGANS: Anteverted uterus containing an IUCD, well seated. Follicles in the ovaries. Distal colonic mild diverticulosis without acute  inflammation. Normal appendix. No adenopathy or free fluid.    MUSCULOSKELETAL: Mild degenerative changes involving the spine with thoracolumbar curve. Superficial small fluid collection in the ventral abdominal wall fat measuring 2.2 cm AP x 1.3 cm transversely x 3 cm CC dimension (image 138, series 2, image 79, series 8), unchanged. Implanted right chest port.    OTHER: None.    IMPRESSION:  1.  Esophageal stent in situ. Metallic densities at the superior aspect and inferior aspect of the esophageal stent, interval decrease in number of foreign bodies. Implanted right chest port, catheter via IJ access, tip in the upper right atrium, unchanged.    2.  Metallic foreign body in the proximal sigmoid colon seen previously is no longer present. Distal colonic mild diverticulosis without acute inflammation. Normal appendix. Anteverted uterus containing an IUCD.    3.  Small ventral abdominal wall fat fluid collection, unchanged. Thoracolumbar curve.

## 2019-11-03 NOTE — H&P
"[facility], [facility] History and Physical        Date of Admission:  11/3/2019  Date of Service: 11/3/2019         HPI      Chief Complaint   LUQ abdominal pain    History is obtained from the patient and from EMR    History of Present Illness   Nevin is a 28 year old female who has a history of multiple foreign  Body ingestions, depression, anxiety, esophageal stent placement 10/9 for esophageal perforation 2/2 foreign body ingestion, who was recently admitted 10/15-10/17 for abdominal pain. She presents today with concerns of LUQ pain.     She states the pain has progressively worsened over the past 2-3 days, she has baseline pain rated 7-8 every day, constant, since the stent placement. When she eats or drinks (soft diet), she has increased pain to a 10. She has tried tylenol, ibuprofen, and oxycodone at home but this has not helped. She has had morphine in the ED, without any change in her pain. She states dilaudid did help reduce her pain to a 5 or 6 this evening. She denies ingesting or inserting foreign bodies, she states her last ingestion was paperclip prior to stent placement.    Nevin's biggest concern today is pain control and she is worried about not being able to tolerate adequate oral intake. She reports losing 20lbs over the last month - she desires weight loss \"but not in this way.\" She has a history of eating disorder - reports not eating for days at a time, then binging and purging - last ED behavior was 2 years ago. She is worried this will trigger her ED.     Patient lives in independent living with [employer] workers as staff. She identifies her support as [employer] workers and her therapist who comes to her independent living for weekly visits.     Patient presented to [facility], who transferred patient here given [facility] thoracic surgery team managing esophageal stent, which is scheduled for removal 12/3/2019.     CT at OSH report:  IMPRESSION:  1.  " Esophageal stent in situ. Metallic densities at the superior aspect and inferior aspect of the esophageal stent, interval decrease in number of foreign bodies. Implanted right chest port, catheter via IJ access, tip in the upper right atrium, unchanged.  2.  Metallic foreign body in the proximal sigmoid colon seen previously is no longer present. Distal colonic mild diverticulosis without acute inflammation. Normal appendix. Anteverted uterus containing an IUCD.  3.  Small ventral abdominal wall fat fluid collection, unchanged. Thoracolumbar curve.         History:   Review of Systems   The 10 point Review of Systems is negative other than noted in the HPI or here.   Review of Systems   Constitutional: Negative for activity change, appetite change, fatigue and fever.   HENT: Positive for sore throat. Negative for trouble swallowing.    Eyes: Negative.    Respiratory: Negative.    Cardiovascular: Negative.    Gastrointestinal: Positive for abdominal pain. Negative for abdominal distention, blood in stool, constipation, diarrhea, nausea and vomiting.   Genitourinary: Negative.    Musculoskeletal: Positive for back pain. Negative for neck pain and neck stiffness.   Skin: Negative.    Neurological: Negative.    Psychiatric/Behavioral: Positive for dysphoric mood. Negative for agitation, behavioral problems, confusion, self-injury and suicidal ideas. The patient is nervous/anxious.      Past Medical History    I have reviewed this patient's medical history and updated it with pertinent information if needed.   Past Medical History:   Diagnosis Date     ADD (attention deficit disorder)      Anorexia nervosa with bulimia     history of; on Topamax     Anxiety      Borderline personality disorder (H)      Depression      Depressive disorder      H/O self-harm      Lives in independent group home     due to debilitating mental illness     Migraine without aura     no known triggers; on Topamax bid and Imitrex PRN     Morbid  obesity (H)      PTSD (post-traumatic stress disorder)      Rectal foreign body - Recurrent issue, self placed      Swallowed foreign body - Recurrent issue, self placed       Past Surgical History   I have reviewed this patient's surgical history and updated it with pertinent information if needed.  Past Surgical History:   Procedure Laterality Date     COMBINED ESOPHAGOSCOPY, GASTROSCOPY, DUODENOSCOPY (EGD), REPLACE ESOPHAGEAL STENT N/A 10/9/2019    Procedure: Upper Endoscopy with Suture Placement;  Surgeon: Zurdo Ramirez MD;  Location: [facility]     ESOPHAGOSCOPY, GASTROSCOPY, DUODENOSCOPY (EGD), COMBINED N/A 3/9/2017    Procedure: COMBINED ESOPHAGOSCOPY, GASTROSCOPY, DUODENOSCOPY (EGD), REMOVE FOREIGN BODY;  Surgeon: Avis Guzmán MD;  Location: [facility] OR     ESOPHAGOSCOPY, GASTROSCOPY, DUODENOSCOPY (EGD), COMBINED N/A 4/20/2017    Procedure: COMBINED ESOPHAGOSCOPY, GASTROSCOPY, DUODENOSCOPY (EGD), REMOVE FOREIGN BODY;  EGD removal Foregin body;  Surgeon: Lokesh Paula MD;  Location: [facility] OR     ESOPHAGOSCOPY, GASTROSCOPY, DUODENOSCOPY (EGD), COMBINED N/A 6/12/2017    Procedure: COMBINED ESOPHAGOSCOPY, GASTROSCOPY, DUODENOSCOPY (EGD);  COMBINED ESOPHAGOSCOPY, GASTROSCOPY, DUODENOSCOPY (EGD) [[id number]]attempted removal of foreign body;  Surgeon: Pamela Perez MD;  Location: [facility]     ESOPHAGOSCOPY, GASTROSCOPY, DUODENOSCOPY (EGD), COMBINED N/A 6/9/2017    Procedure: COMBINED ESOPHAGOSCOPY, GASTROSCOPY, DUODENOSCOPY (EGD), REMOVE FOREIGN BODY;  Esophagoscopy, Gastroscopy, Duodenoscopy, Removal of Foreign Body;  Surgeon: Dejon Marsh MD;  Location: [facility]     ESOPHAGOSCOPY, GASTROSCOPY, DUODENOSCOPY (EGD), COMBINED N/A 1/6/2018    Procedure: COMBINED ESOPHAGOSCOPY, GASTROSCOPY, DUODENOSCOPY (EGD), REMOVE FOREIGN BODY;  COMBINED ESOPHAGOSCOPY, GASTROSCOPY, DUODENOSCOPY (EGD) [by pascal net and snare with profol sedation;  Surgeon: Feliciano Emmanuel MD;  Location:   GI     ESOPHAGOSCOPY, GASTROSCOPY, DUODENOSCOPY (EGD), COMBINED N/A 3/19/2018    Procedure: COMBINED ESOPHAGOSCOPY, GASTROSCOPY, DUODENOSCOPY (EGD), REMOVE FOREIGN BODY;   Esophagodscopy, Gastroscopy, Duodenoscopy,Foreign Body Removal;  Surgeon: Brice Guzmán MD;  Location: [facility]     ESOPHAGOSCOPY, GASTROSCOPY, DUODENOSCOPY (EGD), COMBINED N/A 4/16/2018    Procedure: COMBINED ESOPHAGOSCOPY, GASTROSCOPY, DUODENOSCOPY (EGD), REMOVE FOREIGN BODY;  Esophagogastroduodenoscopy  Foreign Body Removal X 2;  Surgeon: Royer Moise MD;  [facility]: [facility]     ESOPHAGOSCOPY, GASTROSCOPY, DUODENOSCOPY (EGD), COMBINED N/A 6/1/2018    Procedure: COMBINED ESOPHAGOSCOPY, GASTROSCOPY, DUODENOSCOPY (EGD), REMOVE FOREIGN BODY;  COMBINED ESOPHAGOSCOPY, GASTROSCOPY, DUODENOSCOPY with removal of foreign body, propofol sedation by anesthesia;  Surgeon: Brice Martinez MD;  Location:  GI     ESOPHAGOSCOPY, GASTROSCOPY, DUODENOSCOPY (EGD), COMBINED N/A 7/25/2018    Procedure: COMBINED ESOPHAGOSCOPY, GASTROSCOPY, DUODENOSCOPY (EGD), REMOVE FOREIGN BODY;;  Surgeon: Candy Castelan MD;  Location:  GI     ESOPHAGOSCOPY, GASTROSCOPY, DUODENOSCOPY (EGD), COMBINED N/A 7/28/2018    Procedure: COMBINED ESOPHAGOSCOPY, GASTROSCOPY, DUODENOSCOPY (EGD), REMOVE FOREIGN BODY;  COMBINED ESOPHAGOSCOPY, GASTROSCOPY, DUODENOSCOPY (EGD), REMOVE FOREIGN BODY;  Surgeon: Brice Guzmán MD;  [facility]     ESOPHAGOSCOPY, GASTROSCOPY, DUODENOSCOPY (EGD), COMBINED N/A 7/31/2018    Procedure: COMBINED ESOPHAGOSCOPY, GASTROSCOPY, DUODENOSCOPY (EGD);  COMBINED ESOPHAGOSCOPY, GASTROSCOPY, DUODENOSCOPY (EGD) TO REMOVE FOREIGN BODY;  Surgeon: Lokesh Paula MD;  [facility]: [facility]     ESOPHAGOSCOPY, GASTROSCOPY, DUODENOSCOPY (EGD), COMBINED N/A 8/4/2018    Procedure: COMBINED ESOPHAGOSCOPY, GASTROSCOPY, DUODENOSCOPY (EGD), REMOVE FOREIGN BODY;   combined esophagoscopy, gastroscopy, duodenoscopy, REMOVE FOREIGN BODY ;  Surgeon:  Lokesh Paula MD;  [facility]: [facility]     ESOPHAGOSCOPY, GASTROSCOPY, DUODENOSCOPY (EGD), COMBINED N/A 10/6/2019    Procedure: ESOPHAGOGASTRODUODENOSCOPY (EGD) with fireign body removal x2, esophageal stent placement with floroscopy;  Surgeon: Timoteo Espana MD;  Location: [facility]     EXAM UNDER ANESTHESIA ANUS N/A 1/10/2017    Procedure: EXAM UNDER ANESTHESIA ANUS;  Surgeon: Annmarie Haynes MD;  [facility]: [facility]     EXAM UNDER ANESTHESIA RECTUM N/A 7/19/2018    Procedure: EXAM UNDER ANESTHESIA RECTUM;  EXAM UNDER ANESTHESIA, REMOVAL OF RECTAL FOREIGN BODY;  Surgeon: Annmarie Haynes MD;  Location: [facility]     HC REMOVE FECAL IMPACTION OR FB W ANESTHESIA N/A 12/18/2016    Procedure: REMOVE FECAL IMPACTION/FOREIGN BODY UNDER ANESTHESIA;  Surgeon: Soham Cano MD;  Location:  OR     KNEE SURGERY - removed a small tissue mass from knee Right      LAPAROSCOPIC ABLATION ENDOMETRIOSIS       LAPAROTOMY EXPLORATORY N/A 1/10/2017    Procedure: LAPAROTOMY EXPLORATORY;  Surgeon: Annmarie Haynes MD;  Location: [facility]     LAPAROTOMY EXPLORATORY  09/11/2019    Manual manipulation of bowel to remove pill bottle in rectum     lymph nodes removed from neck; benign  age 7     MAMMOPLASTY REDUCTION Bilateral      RELEASE CARPAL TUNNEL Bilateral      SIGMOIDOSCOPY FLEXIBLE N/A 1/10/2017    Procedure: SIGMOIDOSCOPY FLEXIBLE;  Surgeon: Annmarie Haynes MD;  Location: [facility]     SIGMOIDOSCOPY FLEXIBLE N/A 5/8/2018    Procedure: SIGMOIDOSCOPY FLEXIBLE;  flex sig with foreign body removal using snare and rattooth forcep;  Surgeon: Soham Cano MD;  Location:  GI     SIGMOIDOSCOPY FLEXIBLE N/A 2/20/2019    Procedure: Exam under anesthesia Colonoscopy with attempted  removal of rectal foreign body;  Surgeon: Estrada Chávez MD;  Location: [facility] OR      Social History   Social History     Tobacco Use     Smoking status: Never Smoker     Smokeless tobacco: Never Used   Substance Use Topics      Alcohol use: No     Alcohol/week: 0.0 standard drinks     Drug use: No     Family History   I have reviewed this patient's family history and updated it with pertinent information if needed.   Family History   Problem Relation Age of Onset     Diabetes Type 2  Maternal Grandmother      Diabetes Type 2  Paternal Grandmother      Breast Cancer Paternal Grandmother      Cerebrovascular Disease Father 53     Myocardial Infarction No family hx of      Coronary Artery Disease Early Onset No family hx of      Ovarian Cancer No family hx of      Colon Cancer No family hx of      Prior to Admission Medications   Prior to Admission Medications   Prescriptions Last Dose Informant Patient Reported? Taking?   acetaminophen (TYLENOL) 500 MG tablet  Self Yes No   Sig: Take 2 tablets (1,000 mg) by mouth every 6 hours as needed for pain or headache   albuterol (PROAIR HFA) 108 (90 Base) MCG/ACT inhaler  Self Yes No   Sig: Inhale 2 puffs into the lungs 4 times daily as needed    busPIRone (BUSPAR) 10 MG tablet  Self Yes No   Sig: Take 1 tablet (10 mg) by mouth twice daily   clindamycin (CLEOCIN) 75 MG/5ML solution  Self No No   Sig: Take 20 mLs (300 mg) by mouth every 6 hours for 7 days   cloNIDine (CATAPRES) 0.1 MG tablet  Self Yes No   Sig: Take 0.1 mg by mouth 2 times daily    desvenlafaxine (PRISTIQ) 100 MG 24 hr tablet  Self Yes No   Sig: Take 1 tablet (100 mg) by mouth every morning   diphenhydrAMINE (BENADRYL) 25 MG capsule  Self Yes No   Sig: Take 1-2 capsules (25-50 mg) by mouth every 6 hours as needed for itching or sleep   docusate (COLACE) 50 MG/5ML liquid  Self No No   Sig: Take 10 mLs (100 mg) by mouth 2 times daily for 15 days   docusate sodium (COLACE) 100 MG capsule  Self Yes No   Sig: Take 1 capsule (100 mg) by mouth twice daily as needed for constipation   fluconazole (DIFLUCAN) 40 MG/ML suspension  Self No No   Sig: Take 5 mLs (200 mg) by mouth daily for 7 days   gabapentin (NEURONTIN) 300 MG capsule  Self Yes No    Sig: Take 2 capsules (600 mg) by mouth three times daily   ibuprofen (ADVIL/MOTRIN) 100 MG/5ML suspension  Self No No   Sig: Take 20 mLs (400 mg) by mouth every 6 hours as needed for moderate pain   levofloxacin (LEVAQUIN) 25 MG/ML solution  Self No No   Sig: Take 20 mLs (500 mg) by mouth daily for 7 days   lurasidone (LATUDA) 60 MG TABS tablet  Self Yes No   Sig: Take 1 tablet (60 mg) by mouth at bedtime   menthol (ICY HOT) 5 % PTCH  Self No No   Sig: Apply 1 patch topically every 8 hours as needed for muscle soreness   metFORMIN (GLUCOPHAGE-XR) 500 MG 24 hr tablet  Self Yes No   Sig: Take 1 tablet (500 mg) by mouth daily   omeprazole (PRILOSEC) 2 mg/mL suspension  Self No No   Sig: Take 10 mLs (20 mg) by mouth every morning (before breakfast)   ondansetron (ZOFRAN-ODT) 8 MG ODT tab  Self No No   Sig: Take 1 tablet (8 mg) by mouth every 6 hours as needed for nausea or vomiting   oxyCODONE (ROXICODONE) 5 MG/5ML solution  Self No No   Sig: Take 5 mLs (5 mg) by mouth every 4 hours as needed for severe pain   polyethylene glycol (MIRALAX/GLYCOLAX) packet   No No   Sig: Take 17 g by mouth 2 times daily   topiramate (TOPAMAX) 100 MG tablet  Self Yes No   Sig: Take 1 tablet (100 mg) by mouth daily at bedtime   vitamin D3 (CHOLECALCIFEROL) 2000 units (50 mcg) tablet  Self Yes No   Sig: Take 1 tablet (2,000 units) by mouth daily      Facility-Administered Medications: None     Allergies   Allergies   Allergen Reactions     Amoxicillin-Pot Clavulanate Other (See Comments), Rash and Swelling     PN: facial swelling, left side. Also had cortisone injection the same day as she started the Augmentin.  PN: facial swelling, left side. Also had cortisone injection the same day as she started the Augmentin.  Noted in downtime recovery of chart.       Penicillins Anaphylaxis     Blood-Group Specific Substance Other (See Comments)     Patient has an anti-Cw and non-specific antibodies. Blood product orders may be delayed. Draw one  red top and two purple top tubes for all type/screen/crossmatch orders.     Hydrocodone Nausea and Vomiting     vomiting for days     Influenza Vaccines Other (See Comments)     Oseltamivir Hives     med stopped, PN: med stopped     Vancomycin Swelling     Vicodin [Hydrocodone-Acetaminophen] Nausea and Vomiting     Cephalosporins Rash     Lamotrigine Rash     Possibly associated with Lamictal.      Latex Rash           Physical Exam      Vital Signs: Temp: 98.3  F (36.8  C) Temp src: Oral BP: (!) 141/98 Pulse: 81   Resp: 18 SpO2: 94 % O2 Device: None (Room air)    Weight: 250 lbs 0 oz    Physical Exam  Vitals signs reviewed.   Constitutional:       General: She is not in acute distress.     Appearance: She is obese. She is not toxic-appearing.      Comments: Tearful when discussing her mental health and eating disorder   HENT:      Nose: No congestion or rhinorrhea.      Mouth/Throat:      Mouth: Mucous membranes are moist.      Comments: White plaques concerning for oral thrush  Eyes:      Conjunctiva/sclera: Conjunctivae normal.   Neck:      Musculoskeletal: Normal range of motion. No neck rigidity.   Cardiovascular:      Rate and Rhythm: Normal rate and regular rhythm.      Pulses: Normal pulses.   Pulmonary:      Effort: Pulmonary effort is normal. No respiratory distress.   Abdominal:      General: There is no distension.      Tenderness: There is tenderness (LUQ). There is no guarding or rebound.   Skin:     Findings: No rash.   Neurological:      General: No focal deficit present.      Mental Status: She is alert and oriented to person, place, and time.   Psychiatric:         Attention and Perception: Attention and perception normal.         Mood and Affect: Mood is anxious. Affect is blunt and flat.         Behavior: Behavior is slowed. Behavior is cooperative.         Thought Content: Thought content does not include suicidal ideation.         Cognition and Memory: Cognition and memory normal.       Comments: Slowed speech       Assessment & Plan      Nevin is a 28 year old female admitted on 11/3/2019. She has a history of multiple foreign body ingestions, depression, anxiety, esophageal stent placement 10/9 for esophageal perforation 2/2 foreign body ingestion, who was recently admitted 10/15-10/17 for abdominal pain. She presents today with concerns of LUQ pain.     # Acute LUQ abdominal pain  # Esophageal stent placement, 10/9  Patient presenting with worsening LUQ abdominal pain. Unclear etiology at this time. Patient had CT done at OSH, report concerning for foreign bodies above and below esophageal stent - but report comments on decrease in foreign body amount - PACS with images, will have surgical team review. Patient could have pain 2/2 stent - although position appears stable in imaging. Stent placed 10/9, patient completed treatment with clindamycin, fluconazole, levofloxacin.  - Thoracic surgery consulted  - Pain control: scheduled tylenol 650mg QID, dilaudid 2mg po Q6H PRN  - Continue PTA omeprazole 20mg QAM   - Nausea control with zofran and compazine PRN    # Thrush  - nystatin QID    # Complex psychosocial history  # Depression  # Anxiety  # Self injurious behavior  # Compulsive foreign body ingestion and insertion  - Continue PTA meds: buspar 10mg BID, clonidine 0.1mg BID, desvenlafaxine 100mg daily, gabapentin 600mg BID, latuda 60mg at bedtime    # Neuropathy  Receives Gamunex C [id number], portacath in place since June.    # Eating disorder, in remission  # Obesity  Patient is most concerned about restricted eating given stent causing relapse of her eating disorder. Provided reassurance, and will recommend continued outpatient monitoring in case patient has relapse.  - Continue PTA metfomin 500mg daily, topiramate 100mg at bedtime     # Pain Assessment:  Current Pain Score 11/3/2019   Patient currently in pain? -   Pain score (0-10) 4   Pain location -   Pain descriptors -   - Nevin sanchez  experiencing pain due to acute worsening of chronic abdominal pain. Pain management was discussed and the plan was created in a collaborative fashion.  Nevin's response to the current recommendations: mixed response  - Please see the plan for pain management as documented above    Diet: NPO for Medical/Clinical Reasons Except for: Meds  Fluids: D5 1/2NS + 20K at 75mL/h while NPO  DVT Prophylaxis: Low Risk/Ambulatory with no VTE prophylaxis indicated  Code Status: Full Code    Disposition Plan   Expected discharge: 2 - 3 days; recommended to prior living arrangement once adequate pain management/ tolerating PO medications. Dispo:        Entered: Anabel Sanches 11/03/2019, 9:31 AM   Information in the above section will display in the discharge planner report.    Discussed with faculty overnight but not examined by faculty overnight. Patient then examined by Dr. Conner during day rounding on 11/3/2019    Patient original seen and evaluated by Eric Hung DO overnight. This AM, H&P written by Anabel Sanches DO.   [facility] Inpatient Service  [facility]   Pager: [phone]  Please see sticky note for cross cover information      Results:     Data   Recent Labs   Lab 11/03/19  0456   WBC 7.9   HGB 12.2   MCV 89         POTASSIUM 3.4   CHLORIDE 108   CO2 25   BUN 14   CR 0.70   ANIONGAP 6   KELBY 9.2   *     EXAM: CT CHEST, ABDOMEN AND PELVIS WITH IV CONTRAST  LOCATION: [facility]  DATE/TIME: 11/2/2019 8:36 PM  INDICATION: Pain. Esophageal stent. Recent fluid collection. Elevated CRP, ESR and white cell count.  COMPARISON:   1. CT pulmonary angiogram 10/18/2019.  2. CT abdomen and pelvis with IV contrast 10/30/2019.  TECHNIQUE: Helical thin-section CT scan of the chest, abdomen, and pelvis was performed following injection of IV contrast. Multiplanar reformats were obtained. Dose reduction techniques were used.   CONTRAST: 100 mL Omnipaque 350 IV.  FINDINGS:   LUNGS AND  PLEURA: Motion artifact. Lungs are otherwise clear. No pleural effusion on either side. Previously seen nonspecific bibasilar air trapping has resolved.  MEDIASTINUM/AXILLAE: Esophageal stent. Metallic foreign bodies at the superior and inferior aspect of the esophageal stent (images 71-75, series 5), interval decrease in number of foreign bodies. Right IJ catheter tip in the upper right atrium. Normal caliber great vessels and the thoracic arch. Normal cardiac size. No pericardial effusion. Trachea is midline.  HEPATOBILIARY: Diffusely fatty infiltrated liver with additional focal fat in the usual location in the left lobe. Patent hepatic and portal veins. No calcified gallstones or biliary dilatation. No significant change.  PANCREAS: Normal.  SPLEEN: Normal.  ADRENAL GLANDS: Normal.  KIDNEYS/BLADDER: No stones or obstruction. Both kidneys are well perfused. Normal urinary bladder. Anteverted uterus containing an IUCD, well seated.  BOWEL: Gas and stool material within normal caliber colon. Normal appendix. Linear density in the sigmoid colon distally seen previously is no longer evident.  LYMPH NODES: No suspicious abdominopelvic adenopathy.  VASCULATURE: Normal caliber abdominal aorta and the IVC.  PELVIC ORGANS: Anteverted uterus containing an IUCD, well seated. Follicles in the ovaries. Distal colonic mild diverticulosis without acute inflammation. Normal appendix. No adenopathy or free fluid.  MUSCULOSKELETAL: Mild degenerative changes involving the spine with thoracolumbar curve. Superficial small fluid collection in the ventral abdominal wall fat measuring 2.2 cm AP x 1.3 cm transversely x 3 cm CC dimension (image 138, series 2, image 79, series 8), unchanged. Implanted right chest port.  OTHER: None.  IMPRESSION:  1.  Esophageal stent in situ. Metallic densities at the superior aspect and inferior aspect of the esophageal stent, interval decrease in number of foreign bodies. Implanted right chest port,  catheter via IJ access, tip in the upper right atrium, unchanged.  2.  Metallic foreign body in the proximal sigmoid colon seen previously is no longer present. Distal colonic mild diverticulosis without acute inflammation. Normal appendix. Anteverted uterus containing an IUCD.  3.  Small ventral abdominal wall fat fluid collection, unchanged. Thoracolumbar curve.

## 2019-11-03 NOTE — ED TRIAGE NOTES
Pt presents via EMS from [facility] ED for evaluation of abdominal pain and throat pain.  She reports a hx of esophageal perforation on 10/5 with stent placement, done here.

## 2019-11-03 NOTE — PLAN OF CARE
GOALS:     -diagnostic tests and consults completed and resulted: No  -vital signs normal or at patient baseline: Met  -tolerating oral intake to maintain hydration: No - just advanced to clears  - CT surgery eval - Still needs  Nurse to notify provider when observation goals have been met and patient is ready for discharge.

## 2019-11-03 NOTE — PROVIDER NOTIFICATION
Pt c/o of sharp chest pain on left side of chest. AVSS - see chart, denies SOB nor dizziness.  updated and is now bedside.

## 2019-11-03 NOTE — ED PROVIDER NOTES
History     Chief Complaint   Patient presents with     Abdominal Pain     Throat Pain     HPI  28-year-old female, well-known to the emergency department with history of multiple foreign body ingestions, depression anxiety, presents to emergency department as a transfer from [facility].  Patient had a foreign body ingestion of audible paperclips that were ultimately removed via endoscopy.  According to his records there was a small nick in the esophagus during this procedure.  Patient had a esophageal stent placed by Dr. Espana of the CT surgery team on 10/5/2019.  Stent is scheduled for removal on December 3, 2019.  Since then placement, she has had pain to this area, has requested multiple times to have the stent removed prematurely.  She states has been worsening over the past 2 to 3 days, she has difficulty keeping her soft liquid diet down.  Has had multiple episodes of vomiting.  The surgery team was contacted while patient was at [facility] and recommended that she be transferred to the [facility] for admission.  My assessment, patient is nondistressed.  She is requesting some pain medication for the pain that she has been having.  They did a CT scan of [facility] which reportedly showed adequate stent placement and no other abnormalities.  She denies any shortness of breath, fever/chills, cough, recent illness, leg swelling, abdominal pain, and has no additional medical complaints at this time.        I have reviewed the Medications, Allergies, Past Medical and Surgical History, and Social History in the Epic system.    Review of Systems   Constitutional: Negative for fever.   HENT: Positive for sore throat and trouble swallowing. Negative for congestion.    Eyes: Negative for redness.   Respiratory: Negative for shortness of breath.    Cardiovascular: Positive for chest pain.   Gastrointestinal: Negative for abdominal pain.   Genitourinary: Negative for difficulty urinating.   Musculoskeletal:  Negative for arthralgias and neck stiffness.   Skin: Negative for color change.   Neurological: Negative for headaches.   Psychiatric/Behavioral: Negative for confusion.       Physical Exam   BP: 133/86  Pulse: 87  Temp: 98.3  F (36.8  C)  Resp: 18  Weight: 113.4 kg (250 lb)  SpO2: 97 %      Physical Exam  Vitals signs and nursing note reviewed.   Constitutional:       General: She is not in acute distress.     Appearance: She is not diaphoretic.   HENT:      Head: Atraumatic.      Mouth/Throat:      Mouth: Mucous membranes are moist.      Pharynx: Oropharynx is clear. No oropharyngeal exudate.   Eyes:      General: No scleral icterus.     Pupils: Pupils are equal, round, and reactive to light.   Neck:      Musculoskeletal: No neck rigidity or muscular tenderness.   Cardiovascular:      Rate and Rhythm: Normal rate and regular rhythm.      Heart sounds: Normal heart sounds.   Pulmonary:      Effort: No respiratory distress.      Breath sounds: Normal breath sounds.   Chest:      Chest wall: No tenderness.   Abdominal:      General: Bowel sounds are normal.      Palpations: Abdomen is soft.      Tenderness: There is no tenderness.   Musculoskeletal:         General: No tenderness.   Skin:     General: Skin is warm.      Capillary Refill: Capillary refill takes less than 2 seconds.      Findings: No rash.   Neurological:      General: No focal deficit present.      Mental Status: She is oriented to person, place, and time.   Psychiatric:         Mood and Affect: Mood normal.         ED Course     Results for orders placed or performed during the hospital encounter of 11/03/19 (from the past 24 hour(s))   XR Chest 2 Views    Narrative    Preliminary report:  This is a preliminary resident interpretation. Full report to follow.        Impression    Impression: Stable position of esophageal stent.   Basic metabolic panel   Result Value Ref Range    Sodium 139 133 - 144 mmol/L    Potassium 3.4 3.4 - 5.3 mmol/L    Chloride  108 94 - 109 mmol/L    Carbon Dioxide 25 20 - 32 mmol/L    Anion Gap 6 3 - 14 mmol/L    Glucose 114 (H) 70 - 99 mg/dL    Urea Nitrogen 14 7 - 30 mg/dL    Creatinine 0.70 0.52 - 1.04 mg/dL    GFR Estimate >90 >60 mL/min/[1.73_m2]    GFR Estimate If Black >90 >60 mL/min/[1.73_m2]    Calcium 9.2 8.5 - 10.1 mg/dL   CBC with platelets differential   Result Value Ref Range    WBC 7.9 4.0 - 11.0 10e9/L    RBC Count 4.31 3.8 - 5.2 10e12/L    Hemoglobin 12.2 11.7 - 15.7 g/dL    Hematocrit 38.5 35.0 - 47.0 %    MCV 89 78 - 100 fl    MCH 28.3 26.5 - 33.0 pg    MCHC 31.7 31.5 - 36.5 g/dL    RDW 14.0 10.0 - 15.0 %    Platelet Count 304 150 - 450 10e9/L    Diff Method Automated Method     % Neutrophils 62.9 %    % Lymphocytes 21.6 %    % Monocytes 8.7 %    % Eosinophils 5.7 %    % Basophils 0.8 %    % Immature Granulocytes 0.3 %    Nucleated RBCs 0 0 /100    Absolute Neutrophil 5.0 1.6 - 8.3 10e9/L    Absolute Lymphocytes 1.7 0.8 - 5.3 10e9/L    Absolute Monocytes 0.7 0.0 - 1.3 10e9/L    Absolute Eosinophils 0.5 0.0 - 0.7 10e9/L    Absolute Basophils 0.1 0.0 - 0.2 10e9/L    Abs Immature Granulocytes 0.0 0 - 0.4 10e9/L    Absolute Nucleated RBC 0.0             Labs Ordered and Resulted from Time of ED Arrival Up to the Time of Departure from the ED   BASIC METABOLIC PANEL - Abnormal; Notable for the following components:       Result Value    Glucose 114 (*)     All other components within normal limits   CBC WITH PLATELETS DIFFERENTIAL   PULSE OXIMETRY NURSING            Assessments & Plan (with Medical Decision Making)    Patient presented for evaluation of continued pain in her throat with her known esophageal stent.  Had imaging done at outside hospital.  Shows adequate placement of the stent.  Arrival, she is nondistressed.  She is asking for some pain medication and nausea medication which I provided.  Case was discussed with CT surgery who placed the stent initially.  They evaluated patient at bedside.  They recommended  that patient be admitted to the medicine service for pain control.  They are not planning any immediate intervention, but will continue to assess the patient while she is in the hospital.  Case was discussed with the family medicine/[place] service.  All findings discussed with the patient.      I have reviewed the nursing notes.    I have reviewed the findings, diagnosis, plan and need for follow up with the patient.    New Prescriptions    No medications on file       Final diagnoses:   Dysphagia, unspecified type       11/3/2019   [facility], [place], EMERGENCY DEPARTMENT     David Frias,   11/03/19 0722

## 2019-11-03 NOTE — ED NOTES
[facility], [place]   ED Nurse to Floor Handoff     Nevin Alvarado is a 28 year old female who speaks English and lives alone,  in a home  They arrived in the ED by ambulance from [facility] emergency room    ED Chief Complaint: Abdominal Pain and Throat Pain    ED Dx;   Final diagnoses:   Dysphagia, unspecified type         Needed?: No    Allergies:   Allergies   Allergen Reactions     Amoxicillin-Pot Clavulanate Other (See Comments), Rash and Swelling     PN: facial swelling, left side. Also had cortisone injection the same day as she started the Augmentin.  PN: facial swelling, left side. Also had cortisone injection the same day as she started the Augmentin.  Noted in downtime recovery of chart.       Penicillins Anaphylaxis     Blood-Group Specific Substance Other (See Comments)     Patient has an anti-Cw and non-specific antibodies. Blood product orders may be delayed. Draw one red top and two purple top tubes for all type/screen/crossmatch orders.     Hydrocodone Nausea and Vomiting     vomiting for days     Influenza Vaccines Other (See Comments)     Oseltamivir Hives     med stopped, PN: med stopped     Vancomycin Swelling     Vicodin [Hydrocodone-Acetaminophen] Nausea and Vomiting     Cephalosporins Rash     Lamotrigine Rash     Possibly associated with Lamictal.      Latex Rash   .  Past Medical Hx:   Past Medical History:   Diagnosis Date     ADD (attention deficit disorder)      Anorexia nervosa with bulimia     history of; on Topamax     Anxiety      Borderline personality disorder (H)      Depression      Depressive disorder      H/O self-harm      Lives in independent group home     due to debilitating mental illness     Migraine without aura     no known triggers; on Topamax bid and Imitrex PRN     Morbid obesity (H)      PTSD (post-traumatic stress disorder)      Rectal foreign body - Recurrent issue, self placed      Swallowed foreign body - Recurrent  issue, self placed       Baseline Mental status: WDL  Current Mental Status changes: at basesline    Infection present or suspected this encounter: no  Sepsis suspected: No  Isolation type: No active isolations     Activity level - Baseline/Home:  Independent  Activity Level - Current:   Independent    Bariatric equipment needed?: No    In the ED these meds were given:   Medications   morphine (PF) injection 4 mg (4 mg Intravenous Given 11/3/19 8015)       Drips running?  No    Home pump  No    Current LDAs  Port A Cath Single Right Chest wall (Active)   Line Status Blood return noted;Saline locked 11/3/2019  4:58 AM   Dressing Intervention Transparent;New dressing;Dressing reinforced 11/3/2019  4:58 AM   Number of days: 28       Incision/Surgical Site 10/07/19 Lower;Midline Abdomen (Active)   Number of days: 27       Labs results:   Labs Ordered and Resulted from Time of ED Arrival Up to the Time of Departure from the ED   BASIC METABOLIC PANEL - Abnormal; Notable for the following components:       Result Value    Glucose 114 (*)     All other components within normal limits   CBC WITH PLATELETS DIFFERENTIAL       Imaging Studies:   Recent Results (from the past 24 hour(s))   XR Chest 2 Views    Narrative    Preliminary report:  This is a preliminary resident interpretation. Full report to follow.        Impression    Impression: Stable position of esophageal stent.       Recent vital signs:   /78   Pulse 92   Temp 98.3  F (36.8  C) (Oral)   Resp 18   Wt 113.4 kg (250 lb)   SpO2 95%   BMI 45.73 kg/m      [place] Coma Scale Score: 15 (11/03/19 0354)       Skin/wound Issues: None    Code Status: prior states full    Pain control: fair    Nausea control: pt had none    Abnormal labs/tests/findings requiring intervention: none    Family present during ED course? No   Family Comments/Social Situation comments: Hx of ingesting sharp objects, has been on a [employer] in the ED and searched by security for sharp  objects on her person    Tasks needing completion: None    Lauren Marcus RN   [phone] [facility]

## 2019-11-03 NOTE — PLAN OF CARE
GOALS:    -diagnostic tests and consults completed and resulted: No  -vital signs normal or at patient baseline: No, HTN a bit  -tolerating oral intake to maintain hydration: No - NPO   - CT surgery eval - Still needs  Nurse to notify provider when observation goals have been met and patient is ready for discharge.

## 2019-11-04 ENCOUNTER — APPOINTMENT (OUTPATIENT)
Dept: GENERAL RADIOLOGY | Facility: CLINIC | Age: 29
End: 2019-11-04
Attending: STUDENT IN AN ORGANIZED HEALTH CARE EDUCATION/TRAINING PROGRAM
Payer: COMMERCIAL

## 2019-11-04 LAB
ALBUMIN SERPL-MCNC: 2.8 G/DL (ref 3.4–5)
ALP SERPL-CCNC: 67 U/L (ref 40–150)
ALT SERPL W P-5'-P-CCNC: 20 U/L (ref 0–50)
ANION GAP SERPL CALCULATED.3IONS-SCNC: 4 MMOL/L (ref 3–14)
AST SERPL W P-5'-P-CCNC: 8 U/L (ref 0–45)
BILIRUB SERPL-MCNC: 0.2 MG/DL (ref 0.2–1.3)
BUN SERPL-MCNC: 8 MG/DL (ref 7–30)
CALCIUM SERPL-MCNC: 8.2 MG/DL (ref 8.5–10.1)
CHLORIDE SERPL-SCNC: 114 MMOL/L (ref 94–109)
CO2 SERPL-SCNC: 24 MMOL/L (ref 20–32)
CREAT SERPL-MCNC: 0.57 MG/DL (ref 0.52–1.04)
ERYTHROCYTE [DISTWIDTH] IN BLOOD BY AUTOMATED COUNT: 13.9 % (ref 10–15)
GFR SERPL CREATININE-BSD FRML MDRD: >90 ML/MIN/{1.73_M2}
GLUCOSE SERPL-MCNC: 118 MG/DL (ref 70–99)
HCT VFR BLD AUTO: 36.4 % (ref 35–47)
HGB BLD-MCNC: 11.3 G/DL (ref 11.7–15.7)
MCH RBC QN AUTO: 28.3 PG (ref 26.5–33)
MCHC RBC AUTO-ENTMCNC: 31 G/DL (ref 31.5–36.5)
MCV RBC AUTO: 91 FL (ref 78–100)
PLATELET # BLD AUTO: 255 10E9/L (ref 150–450)
POTASSIUM SERPL-SCNC: 3.7 MMOL/L (ref 3.4–5.3)
PROT SERPL-MCNC: 6.5 G/DL (ref 6.8–8.8)
RBC # BLD AUTO: 4 10E12/L (ref 3.8–5.2)
SODIUM SERPL-SCNC: 141 MMOL/L (ref 133–144)
WBC # BLD AUTO: 7.8 10E9/L (ref 4–11)

## 2019-11-04 PROCEDURE — G0378 HOSPITAL OBSERVATION PER HR: HCPCS

## 2019-11-04 PROCEDURE — 25000132 ZZH RX MED GY IP 250 OP 250 PS 637: Performed by: STUDENT IN AN ORGANIZED HEALTH CARE EDUCATION/TRAINING PROGRAM

## 2019-11-04 PROCEDURE — 71046 X-RAY EXAM CHEST 2 VIEWS: CPT

## 2019-11-04 PROCEDURE — 96376 TX/PRO/DX INJ SAME DRUG ADON: CPT | Performed by: FAMILY MEDICINE

## 2019-11-04 PROCEDURE — 25000128 H RX IP 250 OP 636: Performed by: STUDENT IN AN ORGANIZED HEALTH CARE EDUCATION/TRAINING PROGRAM

## 2019-11-04 PROCEDURE — 36592 COLLECT BLOOD FROM PICC: CPT | Performed by: STUDENT IN AN ORGANIZED HEALTH CARE EDUCATION/TRAINING PROGRAM

## 2019-11-04 PROCEDURE — 25800030 ZZH RX IP 258 OP 636: Performed by: STUDENT IN AN ORGANIZED HEALTH CARE EDUCATION/TRAINING PROGRAM

## 2019-11-04 PROCEDURE — 25000132 ZZH RX MED GY IP 250 OP 250 PS 637: Performed by: FAMILY MEDICINE

## 2019-11-04 PROCEDURE — 85027 COMPLETE CBC AUTOMATED: CPT | Performed by: STUDENT IN AN ORGANIZED HEALTH CARE EDUCATION/TRAINING PROGRAM

## 2019-11-04 PROCEDURE — 80053 COMPREHEN METABOLIC PANEL: CPT | Performed by: STUDENT IN AN ORGANIZED HEALTH CARE EDUCATION/TRAINING PROGRAM

## 2019-11-04 RX ORDER — DOCOSANOL 100 MG/G
CREAM TOPICAL
COMMUNITY
End: 2020-11-28

## 2019-11-04 RX ORDER — LIDOCAINE HYDROCHLORIDE 20 MG/ML
15 SOLUTION OROPHARYNGEAL EVERY 6 HOURS PRN
Status: ON HOLD | COMMUNITY
End: 2019-11-05

## 2019-11-04 RX ORDER — OXYCODONE HYDROCHLORIDE 5 MG/1
5 TABLET ORAL EVERY 6 HOURS PRN
Status: ON HOLD | COMMUNITY
End: 2019-11-05

## 2019-11-04 RX ORDER — IBUPROFEN 200 MG
400 TABLET ORAL EVERY 6 HOURS PRN
Status: ON HOLD | COMMUNITY
End: 2019-11-05

## 2019-11-04 RX ORDER — GABAPENTIN 300 MG/1
600 CAPSULE ORAL 4 TIMES DAILY
Status: DISCONTINUED | OUTPATIENT
Start: 2019-11-04 | End: 2019-11-05

## 2019-11-04 RX ORDER — CALCIUM CARBONATE 500 MG/1
1 TABLET, CHEWABLE ORAL 3 TIMES DAILY PRN
COMMUNITY
End: 2020-01-18

## 2019-11-04 RX ORDER — MAGNESIUM HYDROXIDE/ALUMINUM HYDROXICE/SIMETHICONE 120; 1200; 1200 MG/30ML; MG/30ML; MG/30ML
30 SUSPENSION ORAL EVERY 6 HOURS PRN
COMMUNITY
End: 2020-01-18

## 2019-11-04 RX ORDER — LIDOCAINE HYDROCHLORIDE 20 MG/ML
5 SOLUTION OROPHARYNGEAL EVERY 4 HOURS PRN
Status: DISCONTINUED | OUTPATIENT
Start: 2019-11-04 | End: 2019-11-05 | Stop reason: HOSPADM

## 2019-11-04 RX ADMIN — LURASIDONE HYDROCHLORIDE 60 MG: 40 TABLET, FILM COATED ORAL at 22:06

## 2019-11-04 RX ADMIN — ACETAMINOPHEN 650 MG: 325 SOLUTION ORAL at 16:34

## 2019-11-04 RX ADMIN — OMEPRAZOLE 20 MG: KIT at 07:34

## 2019-11-04 RX ADMIN — DESVENLAFAXINE 100 MG: 50 TABLET, FILM COATED, EXTENDED RELEASE ORAL at 07:34

## 2019-11-04 RX ADMIN — NYSTATIN 1000000 UNITS: 500000 SUSPENSION ORAL at 07:34

## 2019-11-04 RX ADMIN — CLONIDINE HYDROCHLORIDE 0.1 MG: 0.1 TABLET ORAL at 07:34

## 2019-11-04 RX ADMIN — NYSTATIN 1000000 UNITS: 500000 SUSPENSION ORAL at 16:34

## 2019-11-04 RX ADMIN — ONDANSETRON 4 MG: 4 TABLET, ORALLY DISINTEGRATING ORAL at 13:43

## 2019-11-04 RX ADMIN — GABAPENTIN 600 MG: 300 CAPSULE ORAL at 14:15

## 2019-11-04 RX ADMIN — TOPIRAMATE 100 MG: 25 TABLET, FILM COATED ORAL at 22:07

## 2019-11-04 RX ADMIN — ACETAMINOPHEN 650 MG: 325 SOLUTION ORAL at 19:38

## 2019-11-04 RX ADMIN — HYDROMORPHONE HYDROCHLORIDE 2 MG: 2 TABLET ORAL at 22:06

## 2019-11-04 RX ADMIN — POTASSIUM CHLORIDE, DEXTROSE MONOHYDRATE AND SODIUM CHLORIDE: 150; 5; 450 INJECTION, SOLUTION INTRAVENOUS at 14:16

## 2019-11-04 RX ADMIN — NYSTATIN 1000000 UNITS: 500000 SUSPENSION ORAL at 11:54

## 2019-11-04 RX ADMIN — HYDROMORPHONE HYDROCHLORIDE 2 MG: 2 TABLET ORAL at 04:07

## 2019-11-04 RX ADMIN — GABAPENTIN 600 MG: 300 CAPSULE ORAL at 19:38

## 2019-11-04 RX ADMIN — CLONIDINE HYDROCHLORIDE 0.1 MG: 0.1 TABLET ORAL at 19:38

## 2019-11-04 RX ADMIN — ACETAMINOPHEN 650 MG: 325 SOLUTION ORAL at 11:54

## 2019-11-04 RX ADMIN — BUSPIRONE HYDROCHLORIDE 10 MG: 10 TABLET ORAL at 07:34

## 2019-11-04 RX ADMIN — PROCHLORPERAZINE EDISYLATE 10 MG: 5 INJECTION, SOLUTION INTRAMUSCULAR; INTRAVENOUS at 17:36

## 2019-11-04 RX ADMIN — BUSPIRONE HYDROCHLORIDE 10 MG: 10 TABLET ORAL at 19:38

## 2019-11-04 RX ADMIN — HYDROMORPHONE HYDROCHLORIDE 2 MG: 2 TABLET ORAL at 16:35

## 2019-11-04 RX ADMIN — HYDROMORPHONE HYDROCHLORIDE 2 MG: 2 TABLET ORAL at 10:13

## 2019-11-04 RX ADMIN — METFORMIN HYDROCHLORIDE 500 MG: 500 TABLET, EXTENDED RELEASE ORAL at 16:34

## 2019-11-04 RX ADMIN — GABAPENTIN 600 MG: 300 CAPSULE ORAL at 07:34

## 2019-11-04 RX ADMIN — NYSTATIN 1000000 UNITS: 500000 SUSPENSION ORAL at 19:38

## 2019-11-04 RX ADMIN — ACETAMINOPHEN 650 MG: 325 SOLUTION ORAL at 07:01

## 2019-11-04 ASSESSMENT — ENCOUNTER SYMPTOMS
CHILLS: 0
ABDOMINAL PAIN: 1
SHORTNESS OF BREATH: 0
FEVER: 0

## 2019-11-04 ASSESSMENT — PAIN DESCRIPTION - DESCRIPTORS: DESCRIPTORS: ACHING;CONSTANT

## 2019-11-04 NOTE — PHARMACY-ADMISSION MEDICATION HISTORY
Admission medication history interview status for the 11/3/2019 admission is complete. See Epic admission navigator for allergy information, pharmacy, prior to admission medications and immunization status.     Medication history interview sources:  Patient, outside meds fill hx, Care Everywhere ([facility])    Changes made to PTA medication list (reason)  Added:   -Oxycodone 5 mg tabs  -Ibuprofen 200 mg tabs  -Docosanol 10% cream  -Advair 100-50 diskus  -Levonorgestrel IUD  -Lidocaine viscous 2% solution  -Maalox suspension  -Calcium carbonate 500 mg chewable tabs  Deleted:   -Clindamycin 75 mg/5ml solution - take 20 mls (300 mg) by mouth every 6 hours for 7 days - ended 10/18/19  -Docusate 50 mg/5 ml liquid - take 10 mls (100 mg) by mouth 2 times daily for 15 days - ended 10/26/19  -Docusate 100 mg caps - take 1 cap by mouth twice daily as needed for constipation - patient reports no longer using  -Fluconazole 40 mg/ml - take 5 mLs (200 mg) by mouth daily for 7 days - ended 10/18/19  -Levofloxacin 25 mg/ml solution - take 20 mls (500 mg) by mouth daily for 7 days - ended 10/18/19  -Omeprazole 2 mg/ml suspension - take 10 mls (20 mg) by mouth every morning - patient reports no longer using  -Oxycodone 5 mg/5ml solution - take 5 mls (5 mg) by mouth every 4 hours as needed for severe pain - patient reports she ran out of the liquid, but her provider has written her a new script for oxycodone 5 mg tabs  -Polyethylene glycol packet - take 17 g by mouth two times daily - patient reports no longer using  Changed: none    Additional medication history information (including reliability of information, actions taken by pharmacist):  -Patient was an excellent historian. Knew med names and doses without prompting.  -Patient reports she has ibuprofen tablets and solution at home. She alternates between the two.  -Patient last filled oxycodone 5 mg tabs on 10/18/19 for a quantity of 30 tablets.  -Patient had a  "new IUD placed July 2019.   -Patient reports using Advair \"PRN\" despite it being prescribed \"twice daily.\"      Prior to Admission medications    Medication Sig Last Dose Taking? Auth Provider   acetaminophen (TYLENOL) 500 MG tablet Take 2 tablets (1,000 mg) by mouth every 6 hours as needed for pain or headache Past Month at Unknown time Yes Unknown, Entered By History   albuterol (PROAIR HFA) 108 (90 Base) MCG/ACT inhaler Inhale 2 puffs into the lungs 4 times daily as needed  Past Month at Unknown time Yes Reported, Patient   alum & mag hydroxide-simethicone (MYLANTA/MAALOX) 200-200-20 MG/5ML SUSP suspension Take 30 mLs by mouth every 6 hours as needed for indigestion Past Week at Unknown time Yes Unknown, Entered By History   busPIRone (BUSPAR) 10 MG tablet Take 1 tablet (10 mg) by mouth twice daily 11/2/2019 Yes Unknown, Entered By History   calcium carbonate (TUMS) 500 MG chewable tablet Take 1 chew tab by mouth 3 times daily (with meals) 11/2/2019 Yes Unknown, Entered By History   cloNIDine (CATAPRES) 0.1 MG tablet Take 0.1 mg by mouth 2 times daily  11/2/2019 Yes Reported, Patient   desvenlafaxine (PRISTIQ) 100 MG 24 hr tablet Take 1 tablet (100 mg) by mouth every morning 11/2/2019 Yes Reported, Patient   diphenhydrAMINE (BENADRYL) 25 MG capsule Take 1-2 capsules (25-50 mg) by mouth every 6 hours as needed for itching or sleep Past Month at Unknown time Yes Unknown, Entered By History   fluticasone-salmeterol (ADVAIR) 100-50 MCG/DOSE inhaler Inhale 1 puff into the lungs every 12 hours Past Month at Unknown time Yes Unknown, Entered By History   gabapentin (NEURONTIN) 600 MG tablet Take 600 mg by mouth 3 times daily  11/2/2019 Yes Reported, Patient   ibuprofen (ADVIL/MOTRIN) 100 MG/5ML suspension Take 20 mLs (400 mg) by mouth every 6 hours as needed for moderate pain Past Month at Unknown time Yes Jaxon Flores PA-C   ibuprofen (ADVIL/MOTRIN) 200 MG tablet Take 400 mg by mouth every 6 hours as needed for " mild pain Past Month at Unknown time Yes Unknown, Entered By History   levonorgestrel (MIRENA) 20 MCG/24HR IUD 1 each by Intrauterine route once  at New IUD placed July 2019 Yes Unknown, Entered By History   lidocaine (XYLOCAINE) 2 % solution Swish and spit 15 mLs in mouth every 6 hours as needed (soar throat) Past Week at Unknown time Yes Unknown, Entered By History   lurasidone (LATUDA) 60 MG TABS tablet Take 1 tablet (60 mg) by mouth at bedtime 11/2/2019 Yes Reported, Patient   menthol (ICY HOT) 5 % PTCH Apply 1 patch topically every 8 hours as needed for muscle soreness Past Month at Unknown time Yes Jaxon Flores PA-C   metFORMIN (GLUCOPHAGE-XR) 500 MG 24 hr tablet Take 1 tablet (500 mg) by mouth daily 11/2/2019 Yes Unknown, Entered By History   ondansetron (ZOFRAN-ODT) 8 MG ODT tab Take 1 tablet (8 mg) by mouth every 6 hours as needed for nausea or vomiting Past Week at Unknown time Yes Jaxon Flores PA-C   oxyCODONE (ROXICODONE) 5 MG tablet Take 5 mg by mouth every 6 hours as needed for severe pain Past Week at Unknown time Yes Unknown, Entered By History   topiramate (TOPAMAX) 100 MG tablet Take 1 tablet (100 mg) by mouth daily at bedtime 11/2/2019 Yes Unknown, Entered By History   vitamin D3 (CHOLECALCIFEROL) 2000 units (50 mcg) tablet Take 1 tablet (2,000 units) by mouth daily 11/2/2019 Yes Unknown, Entered By History   docosanol (ABREVA) 10 % CREA cream Apply to lip 5 times a day as soon as symptoms begin, do not use for more than 10 days. More than a month at Unknown time  Unknown, Entered By History         Medication history completed by:   Rose Matos, [facility] [employer], PD4  November 4, 2019

## 2019-11-04 NOTE — PLAN OF CARE
Status: Pt here from ED after receiving report from previous ED with throat and abdominal s/p surgery 10/16 to esophagus with stent placement. Pt has extensive psych hx and had surgery d/t ingestion of paper clips- room emptied out of sharp objects. Pt close to nurse station for close monitoring.    Vitals: AVSS on RA.   Neuros: A&Ox4. Neuros intact.   Resp/trach: LS CTA.    Diet: Pt ate 50% of clear diet for dinner but then c/o of nausea- Compazine administered with some relief.   Bowel status: BS+x4.   : VDSP in BR. Pt should be assisted to bathroom as she has a hx of rectal foreign body placement.   Skin: Intact.   Pain: Abdominal and throat pain moderately controlled with PRN Dilaudid PO and PRN Tylenol.   Activity: Up Ax1 with GB and walker.   Social: No visitors.   Plan: Will continue to monitor and follow with POC.

## 2019-11-04 NOTE — PLAN OF CARE
Status: Pt on 6a s/p esophageal surgery on 10/16 after ingesting paperclips. See MD note for PMH.  Vitals: BP (!) 137/91   Pulse 83   Temp 98  F (36.7  C) (Oral)   Resp 16   Wt 115.4 kg (254 lb 6.4 oz)   SpO2 97%   BMI 46.53 kg/m    Neuros: A&Ox4.  IV: PIV infusing D5 1/2 NS w/ 20 mEq K at 75 ml/hr.   Resp/trach: On RA.   Diet: Clear liquids, ADAT.   Bowel status: Pt states BM this AM.   : Voiding spontaneously.   Pain: Throat, back, flank pain managed with tylenol, PO dilaudid, aqua K pad. Complains of pain upon swallowing.   Activity: Up w 1/GB/walker.   Social: No visitors this shift.   Plan: CXR completed this shift. Continue to monitor and follow POC.

## 2019-11-04 NOTE — PLAN OF CARE
GOALS:     -diagnostic tests and consults completed and resulted: No  -vital signs normal or at patient baseline: Met  -tolerating oral intake to maintain hydration: No - barely tolerating clears.   - CT surgery eval - Still needs   Nurse to notify provider when observation goals have been met and patient is ready for discharge.

## 2019-11-04 NOTE — PROGRESS NOTES
[facility], [facility] Progress Note  Main Plans for Today   Pain control  Follow up thoracic recs  CXR  Nutrition consult for liquid diet   Pain team consult  Discontinue IVF tomorrow AM    Assessment & Plan   Nevin is a 28 year old female admitted on 11/3/2019. She has a history of multiple foreign body ingestions, depression, anxiety, esophageal stent placement 10/9 for esophageal perforation 2/2 foreign body ingestion, who was recently admitted 10/15-10/17 for abdominal pain. She is admitted for LUQ pain.     # Acute LUQ abdominal pain  # Esophageal stent placement, 10/9  Patient presenting with worsening LUQ abdominal pain. Unclear etiology at this time. Patient had CT done at OSH, report concerning for foreign bodies above and below esophageal stent - but report comments on decrease in foreign body amount - PACS with images, will have surgical team review. Stent placed 10/9, patient completed treatment with clindamycin, fluconazole, levofloxacin. Thoracic surg consulted, just rec adat to soft diet and possible EGD this admission pending hospital course. CXR 11/4 showed stable position of stent. Discussed this with thoracic surg team and they agree, stable and not likely causing pain.  - Thoracic surgery consulted, signed off 11/4   - Pain control: scheduled tylenol 650mg QID, dilaudid 2mg po Q6H as needed   - Patient still has pain and worried about pain regimen going home and requests pain team consult  - Pain team consulted 11/4  - Continue PTA omeprazole 20mg QAM  - Nausea control with zofran and compazine as needed   - ADAT slowly, may be unable to advance to soft diet now with persistent pain  - Nutrition consult 11/4 for liquid diet plan     # Thrush  - nystatin QID     # Complex psychosocial history  # Depression  # Anxiety  # Self injurious behavior  # Compulsive foreign body ingestion and insertion  - Continue PTA meds: buspar 10mg BID, clonidine  0.1mg BID, desvenlafaxine 100mg daily, latuda 60mg at bedtime  - increased gabapentin 600 mg to QID 11/4     # Neuropathy  Receives Gamunex C [id number], portacath in place since June.     # Eating disorder, in remission  # Obesity  Patient concerned about losing 20 pounds with current diet.   - Nutrition consult 11/4 for liquid diet recs  - Continue PTA metfomin 500mg daily, topiramate 100mg at bedtime     # Pain Assessment:  Current Pain Score 11/4/2019   Patient currently in pain? yes   Pain score (0-10) -   Pain location -   Pain descriptors Aching   - Nevin is experiencing pain due to LUQ abd pain. Pain management was discussed and the plan was created in a collaborative fashion.  Nevin's response to the current recommendations: engaged  - Please see the plan for pain management as documented above    Diet: Advance Diet as Tolerated: Clear Liquid Diet; Mechanical/Dental Soft Diet  Snacks/Supplements Adult: Boost Breeze; With Meals  Fluids: 75 ml/hr D5 1/2 NS + KCl 20   DVT Prophylaxis: Low Risk/Ambulatory with no VTE prophylaxis indicated  Code Status: Full Code  Disposition Plan   Expected discharge:Expected Discharge Date: 11/06/19 2 - 3 days, recommended to prior living arrangement once adequate pain management/ tolerating PO medications.Dispo: Expected Discharge Date: 11/06/19  Entered: Jacob Gates 11/04/2019, 9:03 AM   Information in the above section will display in the discharge planner report.    The patient's care was discussed with the Attending Physician, Dr. Carlos Gates  [facility]  Pager: [phone]  Please see sticky note for cross cover information    Interval History  Doing ok overnight. HDS overnight. Pain is still there- LUQ- tried CLD and got nauseated, no vomiting, slowed down and is still on CLD this AM.    On rounds, patient is concerned about the stent and asking if it can be removed. Of course, we defer to thoracic  surgery but stressed importance of keeping stent in place for 6 weeks for her safety and well being. She would like a pain team consult as she is concerned about her pain plan going home. She is upset she lost 20 pounds on her diet.     Review of Systems   Constitutional: Negative for chills and fever.   Respiratory: Negative for shortness of breath.    Gastrointestinal: Positive for abdominal pain.     Physical Exam   Vital Signs: Temp: 98.3  F (36.8  C) Temp src: Oral BP: 116/73 Pulse: 90   Resp: 16 SpO2: 95 % O2 Device: None (Room air)    Weight: 250 lbs 1.6 oz  Physical Exam  Vitals signs reviewed.   Constitutional:       General: She is not in acute distress.     Appearance: She is obese. She is not toxic-appearing.      Comments: monotone voice  HENT:      Nose: No congestion or rhinorrhea.      Mouth/Throat:      Mouth: Mucous membranes are moist.      Comments: White plaques concerning for oral thrush  Eyes:      Conjunctiva/sclera: Conjunctivae normal.   Neck:      Musculoskeletal: Normal range of motion. No neck rigidity.   Cardiovascular:      Rate and Rhythm: Normal rate and regular rhythm.      Pulses: Normal pulses.   Pulmonary:      Effort: Pulmonary effort is normal. No respiratory distress.   Abdominal:      General: There is no distension.      Tenderness: There is tenderness (LUQ). There is no guarding or rebound.   Skin:     Findings: No rash.   Neurological:      General: No focal deficit present.      Mental Status: She is alert and oriented to person, place, and time.   Psychiatric:         Attention and Perception: Attention and perception normal.         Mood and Affect: Mood is normal. Affect is blunt and flat.         Behavior: Behavior is slowed. Behavior is cooperative.         Thought Content: Thought content does not include suicidal ideation.         Cognition and Memory: Cognition and memory normal.      Comments: Slowed speech   Data   Recent Labs   Lab 11/04/19  0657 11/03/19  6523    WBC 7.8 7.9   HGB 11.3* 12.2   MCV 91 89    304    139   POTASSIUM 3.7 3.4   CHLORIDE 114* 108   CO2 24 25   BUN 8 14   CR 0.57 0.70   ANIONGAP 4 6   KELBY 8.2* 9.2   * 114*   ALBUMIN 2.8*  --    PROTTOTAL 6.5*  --    BILITOTAL 0.2  --    ALKPHOS 67  --    ALT 20  --    AST 8  --      No results found for this or any previous visit (from the past 24 hour(s)).

## 2019-11-04 NOTE — PROGRESS NOTES
THORACIC & FOREGUT SURGERY    S:  Pt seen at bedside resting comfortably.   Does note some non-specific abdominal pain.    O:  Vitals:    11/04/19 0403 11/04/19 0731 11/04/19 0900 11/04/19 1210   BP: 136/88 116/73  (!) 137/91   BP Location: Right arm Right arm     Pulse: 85 90  83   Resp: 16 16  16   Temp: 98  F (36.7  C) 98.3  F (36.8  C)  98  F (36.7  C)   TempSrc: Oral Oral  Oral   SpO2: 96% 95%  97%   Weight:   115.4 kg (254 lb 6.4 oz)        A&O, NAD  Breathing non-labored  Soft, NDNT  Distal extremities warm    A/P: Nevin Alvarado is a 28 year old female with history of esophageal perforation 2/2 foreign body ingestion, s/p esophageal stent placement, presents with pain.  CT at outside hospital non-concerning for displacement of esophageal stent or paraesophageal fluid collection    -No thoracic surgery intervention at this time  -Proceed with previously scheduled stent removal  -Thoracic to sign off  -Please feel free to call with questions     Jaxon Flores PA-C  Thoracic Surgery

## 2019-11-04 NOTE — DISCHARGE SUMMARY
[facility], [facility] Discharge Summary   Date of Admission:  11/3/2019  Date of Discharge:  11/5/2019  Date of Service: 11/5/2019  Discharging Attending Provider: Dr. Gonzalez  [facility]    Discharge Diagnoses      Dysphagia, unspecified type  Esophageal dysphagia  Esophageal perforation  Hx of foreign body ingestion    Follow-ups Needed After Discharge   Follow up with PCP within the next 7 days for hospital follow up.   Follow up with Psychiatry on 11/20/19.   Follow up with Thoracic Surgery on 12/03/19 for esophageal stent revision.     Hospital Course   Nevin is a 28 year old female admitted on 11/3/2019. She has a history of multiple foreign body ingestions, depression, anxiety, esophageal stent placement 10/9 for esophageal perforation 2/2 foreign body ingestion, who was recently admitted 10/15-10/17 for abdominal pain. She is admitted for LUQ pain control.     The following problems were addressed during her hospitalization:    # LUQ abdominal pain  # Esophageal stent placement, 10/9  Patient presenting with worsening LUQ abdominal pain that seems musculoskeletal in nature. Patient had CT done at OSH, report concerning for foreign bodies above and below esophageal stent - but report comments on decrease in foreign body amount. Stent placed 10/9, patient completed treatment with clindamycin, fluconazole, levofloxacin. Thoracic surg consulted during her stay, no procedure needed now, awaiting revision/removal of esophageal stent in Dec. CXR 11/4 showed stable position of stent. Discussed this with thoracic surg team and they agree, stable and not likely causing pain. Patient tolerating full liquid diet. Pain team evaluated and agreed on plan for pain control.  - Thoracic surgery consulted, signed off 11/4   - Pain control: see below  - Pain team consulted 11/4, plan to discontinue opiates, adding simethicone QID,  increase tylenol to 1,000 mg QID  - PTA omeprazole 20mg QAM  - Nutrition consulted 11/4 for liquid diet plan     # Thrush  - nystatin QID for 7 total days on discharge     # Complex psychosocial history  # Depression  # Anxiety  # Self injurious behavior  # Compulsive foreign body ingestion and insertion  -  Sees outpatient psychiatry on 11/20/19, continue current meds   - continue gabapentin 600 mg TID      Consultations This Hospital Stay   THORACIC SURGERY ADULT IP CONSULT  PAIN MANAGEMENT ADULT IP CONSULT  NUTRITION SERVICES ADULT IP CONSULT  MEDICATION HISTORY IP PHARMACY CONSULT    Code Status   Full Code     The patient was discussed with Dr. Carlos Red MD  [facility] Inpatient Service  [facility]   Pager: [phone]  ______________________________________________________________________  Review of Systems   Patient with left upper chest tenderness, no SOB, no worsening with exertion, no palpitations or leg swelling. Mid thoracic back pain, diffuse, non-tender. Stable/improved. Very nervous about having to return to hospital.  Complete ROS negative except as described above.     Physical Exam   Vital Signs: Temp: 98.8  F (37.1  C) Temp src: Oral BP: (!) 140/85 Pulse: 90   Resp: 18 SpO2: 98 % O2 Device: None (Room air)    Weight: 255 lbs 11.74 oz    Physical Exam  General: Alert, interactive. Well appearing.    HEENT: No signs of trauma. No rhinorrhea. Moist membranes.   Eyes: Conjunctivae and sclerae are normal. Pupils are equal.   Neck: Normal range of motion.    Resp: No respiratory distress. Normal breath sounds throughout without rales/wheezing.   CV: normal RRR, no murmurs. Normal capillary refill.  MSK: Normal range of motion. No obvious deformity.  Neuro: The patient is alert and interactive. Speech normal. No gross focal symptoms.  Skin: No lesion or sign of trauma noted on exposed skin.   Psych: Affect is anxious, concordant with mood, behavior is  normal.    Significant Results and Procedures   Most Recent 3 CBC's:  Recent Labs   Lab Test 11/05/19  0639 11/04/19  0657 11/03/19  0456   WBC 8.1 7.8 7.9   HGB 11.2* 11.3* 12.2   MCV 91 91 89    255 304     Most Recent 3 BMP's:  Recent Labs   Lab Test 11/05/19  0639 11/04/19  0657 11/03/19  0456    141 139   POTASSIUM 3.7 3.7 3.4   CHLORIDE 114* 114* 108   CO2 21 24 25   BUN 3* 8 14   CR 0.49* 0.57 0.70   ANIONGAP 4 4 6   KELBY 8.6 8.2* 9.2   * 118* 114*     Most Recent 2 LFT's:  Recent Labs   Lab Test 11/04/19  0657 10/25/19  0116   AST 8 18   ALT 20 27   ALKPHOS 67 74   BILITOTAL 0.2 0.3       Primary Care Physician   Latonya Knight    Discharge Disposition   Discharged to home  Condition at discharge: Stable    Discharge Orders     Discharge Medications   Discharge Medication List as of 11/5/2019  3:44 PM      START taking these medications    Details   acetaminophen (TYLENOL) 32 mg/mL liquid Take 31.25 mLs (1,000 mg) by mouth every 6 hours as needed for fever or pain, Disp-355 mL, R-0, E-Prescribe      nystatin (MYCOSTATIN) 069507 UNIT/ML suspension Take 10 mLs (1,000,000 Units) by mouth 4 times daily for 7 daysDisp-280 mL, [id number]      simethicone (MYLICON) 80 MG chewable tablet Take 1 tablet (80 mg) by mouth every 6 hours as needed for flatulence or cramping (after meals), Disp-30 tablet, R-0, E-Prescribe         CONTINUE these medications which have CHANGED    Details   lidocaine (XYLOCAINE) 2 % solution Swish and spit 15 mLs in mouth every 6 hours as needed (soar throat), Disp-100 mL, R-0, E-Prescribe      omeprazole (PRILOSEC) 2 mg/mL suspension Take 10 mLs (20 mg) by mouth every morning (before breakfast), Disp-100 mL, R-0, E-Prescribe         CONTINUE these medications which have NOT CHANGED    Details   albuterol (PROAIR HFA) 108 (90 Base) MCG/ACT inhaler Inhale 2 puffs into the lungs 4 times daily as needed , Historical      alum & mag hydroxide-simethicone (MYLANTA/MAALOX)  200-200-20 MG/5ML SUSP suspension Take 30 mLs by mouth every 6 hours as needed for indigestion, Historical      busPIRone (BUSPAR) 10 MG tablet Take 1 tablet (10 mg) by mouth twice daily, Historical      calcium carbonate (TUMS) 500 MG chewable tablet Take 1 chew tab by mouth 3 times daily (with meals), Historical      cloNIDine (CATAPRES) 0.1 MG tablet Take 0.1 mg by mouth 2 times daily , Historical      desvenlafaxine (PRISTIQ) 100 MG 24 hr tablet Take 1 tablet (100 mg) by mouth every morning, Historical      diphenhydrAMINE (BENADRYL) 25 MG capsule Take 1-2 capsules (25-50 mg) by mouth every 6 hours as needed for itching or sleep, Historical      docosanol (ABREVA) 10 % CREA cream Apply to lip 5 times a day as soon as symptoms begin, do not use for more than 10 days.Historical      fluticasone-salmeterol (ADVAIR) 100-50 MCG/DOSE inhaler Inhale 1 puff into the lungs every 12 hours, Historical      gabapentin (NEURONTIN) 600 MG tablet Take 600 mg by mouth 3 times daily , Historical      levonorgestrel (MIRENA) 20 MCG/24HR IUD 1 each by Intrauterine route onceHistorical      lurasidone (LATUDA) 60 MG TABS tablet Take 1 tablet (60 mg) by mouth at bedtime, Historical      menthol (ICY HOT) 5 % PTCH Apply 1 patch topically every 8 hours as needed for muscle soreness, Disp-10 each, R-1, E-Prescribe      metFORMIN (GLUCOPHAGE-XR) 500 MG 24 hr tablet Take 1 tablet (500 mg) by mouth daily, Historical      ondansetron (ZOFRAN-ODT) 8 MG ODT tab Take 1 tablet (8 mg) by mouth every 6 hours as needed for nausea or vomiting, Disp-20 tablet, R-1, E-Prescribe      topiramate (TOPAMAX) 100 MG tablet Take 1 tablet (100 mg) by mouth daily at bedtime, Historical      vitamin D3 (CHOLECALCIFEROL) 2000 units (50 mcg) tablet Take 1 tablet (2,000 units) by mouth daily, Historical         STOP taking these medications       acetaminophen (TYLENOL) 500 MG tablet Comments:   Reason for Stopping:         ibuprofen (ADVIL/MOTRIN) 100 MG/5ML  suspension Comments:   Reason for Stopping:         ibuprofen (ADVIL/MOTRIN) 200 MG tablet Comments:   Reason for Stopping:         oxyCODONE (ROXICODONE) 5 MG tablet Comments:   Reason for Stopping:             Allergies   Allergies   Allergen Reactions     Amoxicillin-Pot Clavulanate Other (See Comments), Rash and Swelling     PN: facial swelling, left side. Also had cortisone injection the same day as she started the Augmentin.  PN: facial swelling, left side. Also had cortisone injection the same day as she started the Augmentin.  Noted in downtime recovery of chart.       Penicillins Anaphylaxis     Vancomycin Swelling, Itching and Rash     Other reaction(s): Redness  Flushed face, very itchy; HUT Comment: Flushed face, very itchy; HUT Reaction: Angioedema; HUT Reaction: Redness; HUT Severity: Med; HUT Noted: [id number]  Flushed face, very itchy  Flushed face, very itchy  Flushed face, very itchy       Hydrocodone Nausea and Vomiting and GI Disturbance     vomiting for days  vomiting for days  vomiting for days  vomiting for days, PN: vomiting for days  vomiting for days  vomiting for days  vomiting for days  vomiting for days  vomiting for days  vomiting for days, PN: vomiting for days  vomiting for days  vomiting for days  vomiting for days  vomiting for days  ; HUT Comment: vomiting for days; HUT Reaction: Gastrointestinal; HUT Reaction: Nausea And Vomiting; HUT Reaction Type: Intolerance; HUT Severity: Med; HUT Noted: [id number]  vomiting for days       Blood-Group Specific Substance Other (See Comments)     Patient has an anti-Cw and non-specific antibodies. Blood product orders may be delayed. Draw one red top and two purple top tubes for all type/screen/crossmatch orders.     Influenza Vaccines Other (See Comments)     Oseltamivir Hives     med stopped, PN: med stopped     Cephalosporins Rash     Lamotrigine Rash     Possibly associated with Lamictal.   HUT Comment: Possibly associated with Lamictal. ; HUT  Reaction: Rash; HUT Reaction Type: Allergy; HUT Severity: Low; [facility] Noted: [id number]     Latex Rash

## 2019-11-04 NOTE — PLAN OF CARE
Status: Pt admitted after esophogeal surgery on 10/16 (after ingesting paper-clips) for increased pain/difficulty swallowing. Pt has extensive psych history.  Vitals: VSS  Neuros: A/O x4, Intact  IV: PIV infusing D5 at 75/hr via Port  Resp/trach: RA O2 sats stable, Lungs clear  Diet: Clear liq. Diet. Int. Nausea. Pain with swallowing w/no evidence of choking  Bowel status: No BM overnight  : Voiding  Skin: intact  Pain: Throat pain managed w/q6h dilaudid P.O. Also c/o chest wall/upper back pain. MD aware  Activity: Up w/1 + Gb/walker (per pt request)  Plan: Continue to monitor and follow POC

## 2019-11-04 NOTE — PROGRESS NOTES
GOALS:     -diagnostic tests and consults completed and resulted: No  -vital signs normal or at patient baseline: Met  -tolerating oral intake to maintain hydration: No -Clr liq diet. Painful swallowing  - CT surgery eval - Still needs   Nurse to notify provider when observation goals have been met and patient is ready for discharge.

## 2019-11-04 NOTE — PROGRESS NOTES
Observation goals PRIOR TO DISCHARGE     Comments:     -diagnostic tests and consults completed and resulted: not met- x-ray ordered this shift, needs to be completed.  -vital signs normal or at patient baseline: Goal met  -tolerating oral intake to maintain hydration: not met- PO intake improving, ate 75% of breakfast (chicken broth & boost drink)  - CT surgery eval- Not met    Nurse to notify provider when observation goals have been met and patient is ready for discharge.

## 2019-11-05 ENCOUNTER — PATIENT OUTREACH (OUTPATIENT)
Dept: CARE COORDINATION | Facility: CLINIC | Age: 29
End: 2019-11-05

## 2019-11-05 VITALS
DIASTOLIC BLOOD PRESSURE: 85 MMHG | RESPIRATION RATE: 18 BRPM | BODY MASS INDEX: 46.77 KG/M2 | HEART RATE: 90 BPM | OXYGEN SATURATION: 98 % | WEIGHT: 255.73 LBS | TEMPERATURE: 98.8 F | SYSTOLIC BLOOD PRESSURE: 140 MMHG

## 2019-11-05 PROBLEM — Z91.51 H/O: ATTEMPTED SUICIDE: Status: ACTIVE | Noted: 2017-11-25

## 2019-11-05 PROBLEM — Z86.59 HISTORY OF POSTTRAUMATIC STRESS DISORDER (PTSD): Status: ACTIVE | Noted: 2019-11-05

## 2019-11-05 PROBLEM — Z79.899 HIGH RISK MEDICATION USE: Status: ACTIVE | Noted: 2019-11-05

## 2019-11-05 PROBLEM — T74.21XA ADULT SEXUAL ABUSE: Chronic | Status: ACTIVE | Noted: 2017-11-25

## 2019-11-05 PROBLEM — Z72.89 DELIBERATE SELF-CUTTING: Status: ACTIVE | Noted: 2019-01-13

## 2019-11-05 PROBLEM — D21.9 FIBROIDS: Status: ACTIVE | Noted: 2018-03-04

## 2019-11-05 PROBLEM — G62.9 POLYNEUROPATHY: Status: ACTIVE | Noted: 2019-09-24

## 2019-11-05 PROBLEM — R76.8 RED BLOOD CELL ANTIBODY POSITIVE: Status: ACTIVE | Noted: 2018-06-29

## 2019-11-05 PROBLEM — H90.42 SENSORINEURAL HEARING LOSS (SNHL) OF LEFT EAR WITH UNRESTRICTED HEARING OF RIGHT EAR: Status: ACTIVE | Noted: 2018-06-21

## 2019-11-05 PROBLEM — T14.8XXA CONTUSION OF BONE: Status: ACTIVE | Noted: 2019-01-21

## 2019-11-05 PROBLEM — S11.21XS: Status: ACTIVE | Noted: 2019-10-19

## 2019-11-05 PROBLEM — R03.0 ELEVATED BP WITHOUT DIAGNOSIS OF HYPERTENSION: Status: ACTIVE | Noted: 2017-11-23

## 2019-11-05 PROBLEM — R45.89 AT HIGH RISK FOR SELF HARM: Status: ACTIVE | Noted: 2018-11-26

## 2019-11-05 PROBLEM — Z98.890 S/P LAPAROSCOPY: Status: ACTIVE | Noted: 2019-09-21

## 2019-11-05 PROBLEM — S82.131A CLOSED FRACTURE OF MEDIAL PLATEAU OF RIGHT TIBIA: Status: ACTIVE | Noted: 2019-01-10

## 2019-11-05 PROBLEM — K62.89 RECTAL PAIN: Status: ACTIVE | Noted: 2019-08-24

## 2019-11-05 PROBLEM — R26.89 BALANCE PROBLEM: Status: ACTIVE | Noted: 2019-08-30

## 2019-11-05 PROBLEM — T45.0X2A INTENTIONAL DIPHENHYDRAMINE OVERDOSE (H): Status: ACTIVE | Noted: 2018-07-05

## 2019-11-05 PROBLEM — Z74.09 LIMITED MOBILITY: Status: ACTIVE | Noted: 2019-08-30

## 2019-11-05 PROBLEM — T74.21XA ADULT SEXUAL ABUSE: Status: ACTIVE | Noted: 2017-11-25

## 2019-11-05 LAB
ANION GAP SERPL CALCULATED.3IONS-SCNC: 4 MMOL/L (ref 3–14)
BUN SERPL-MCNC: 3 MG/DL (ref 7–30)
CALCIUM SERPL-MCNC: 8.6 MG/DL (ref 8.5–10.1)
CHLORIDE SERPL-SCNC: 114 MMOL/L (ref 94–109)
CO2 SERPL-SCNC: 21 MMOL/L (ref 20–32)
CREAT SERPL-MCNC: 0.49 MG/DL (ref 0.52–1.04)
ERYTHROCYTE [DISTWIDTH] IN BLOOD BY AUTOMATED COUNT: 14.1 % (ref 10–15)
GFR SERPL CREATININE-BSD FRML MDRD: >90 ML/MIN/{1.73_M2}
GLUCOSE SERPL-MCNC: 110 MG/DL (ref 70–99)
HCT VFR BLD AUTO: 36.4 % (ref 35–47)
HGB BLD-MCNC: 11.2 G/DL (ref 11.7–15.7)
MAGNESIUM SERPL-MCNC: 1.8 MG/DL (ref 1.6–2.3)
MCH RBC QN AUTO: 27.9 PG (ref 26.5–33)
MCHC RBC AUTO-ENTMCNC: 30.8 G/DL (ref 31.5–36.5)
MCV RBC AUTO: 91 FL (ref 78–100)
PHOSPHATE SERPL-MCNC: 2.7 MG/DL (ref 2.5–4.5)
PLATELET # BLD AUTO: 267 10E9/L (ref 150–450)
POTASSIUM SERPL-SCNC: 3.7 MMOL/L (ref 3.4–5.3)
RBC # BLD AUTO: 4.01 10E12/L (ref 3.8–5.2)
SODIUM SERPL-SCNC: 140 MMOL/L (ref 133–144)
WBC # BLD AUTO: 8.1 10E9/L (ref 4–11)

## 2019-11-05 PROCEDURE — 25000132 ZZH RX MED GY IP 250 OP 250 PS 637: Performed by: STUDENT IN AN ORGANIZED HEALTH CARE EDUCATION/TRAINING PROGRAM

## 2019-11-05 PROCEDURE — 25000132 ZZH RX MED GY IP 250 OP 250 PS 637: Performed by: FAMILY MEDICINE

## 2019-11-05 PROCEDURE — 83735 ASSAY OF MAGNESIUM: CPT | Performed by: FAMILY MEDICINE

## 2019-11-05 PROCEDURE — 36592 COLLECT BLOOD FROM PICC: CPT | Performed by: FAMILY MEDICINE

## 2019-11-05 PROCEDURE — 25800030 ZZH RX IP 258 OP 636: Performed by: STUDENT IN AN ORGANIZED HEALTH CARE EDUCATION/TRAINING PROGRAM

## 2019-11-05 PROCEDURE — 85027 COMPLETE CBC AUTOMATED: CPT | Performed by: FAMILY MEDICINE

## 2019-11-05 PROCEDURE — 84100 ASSAY OF PHOSPHORUS: CPT | Performed by: FAMILY MEDICINE

## 2019-11-05 PROCEDURE — G0378 HOSPITAL OBSERVATION PER HR: HCPCS

## 2019-11-05 PROCEDURE — 80048 BASIC METABOLIC PNL TOTAL CA: CPT | Performed by: FAMILY MEDICINE

## 2019-11-05 PROCEDURE — 25000128 H RX IP 250 OP 636: Performed by: STUDENT IN AN ORGANIZED HEALTH CARE EDUCATION/TRAINING PROGRAM

## 2019-11-05 PROCEDURE — 99204 OFFICE O/P NEW MOD 45 MIN: CPT | Performed by: NURSE PRACTITIONER

## 2019-11-05 PROCEDURE — 99207 ZZC CONSULT E&M CHANGED TO INITIAL LEVEL: CPT | Performed by: NURSE PRACTITIONER

## 2019-11-05 RX ORDER — NYSTATIN 100000/ML
1000000 SUSPENSION, ORAL (FINAL DOSE FORM) ORAL 4 TIMES DAILY
Qty: 280 ML | Refills: 0 | Status: SHIPPED | OUTPATIENT
Start: 2019-11-05 | End: 2019-11-29

## 2019-11-05 RX ORDER — ACETAMINOPHEN 325 MG/1
650 TABLET ORAL 2 TIMES DAILY PRN
Status: DISCONTINUED | OUTPATIENT
Start: 2019-11-05 | End: 2019-11-05

## 2019-11-05 RX ORDER — GABAPENTIN 300 MG/1
600 CAPSULE ORAL 3 TIMES DAILY
Status: DISCONTINUED | OUTPATIENT
Start: 2019-11-05 | End: 2019-11-05 | Stop reason: HOSPADM

## 2019-11-05 RX ORDER — LIDOCAINE HYDROCHLORIDE 20 MG/ML
15 SOLUTION OROPHARYNGEAL EVERY 6 HOURS PRN
Qty: 100 ML | Refills: 0 | Status: SHIPPED | OUTPATIENT
Start: 2019-11-05 | End: 2020-01-18

## 2019-11-05 RX ORDER — SIMETHICONE 80 MG
80 TABLET,CHEWABLE ORAL EVERY 6 HOURS PRN
Qty: 30 TABLET | Refills: 0 | Status: SHIPPED | OUTPATIENT
Start: 2019-11-05 | End: 2020-01-18

## 2019-11-05 RX ADMIN — NYSTATIN 1000000 UNITS: 500000 SUSPENSION ORAL at 08:04

## 2019-11-05 RX ADMIN — Medication 5 ML: at 15:10

## 2019-11-05 RX ADMIN — ACETAMINOPHEN 650 MG: 325 SOLUTION ORAL at 11:22

## 2019-11-05 RX ADMIN — CLONIDINE HYDROCHLORIDE 0.1 MG: 0.1 TABLET ORAL at 08:04

## 2019-11-05 RX ADMIN — HYDROMORPHONE HYDROCHLORIDE 2 MG: 2 TABLET ORAL at 06:56

## 2019-11-05 RX ADMIN — DESVENLAFAXINE 100 MG: 50 TABLET, FILM COATED, EXTENDED RELEASE ORAL at 08:04

## 2019-11-05 RX ADMIN — NYSTATIN 1000000 UNITS: 500000 SUSPENSION ORAL at 11:23

## 2019-11-05 RX ADMIN — BUSPIRONE HYDROCHLORIDE 10 MG: 10 TABLET ORAL at 08:04

## 2019-11-05 RX ADMIN — ACETAMINOPHEN 650 MG: 325 SOLUTION ORAL at 04:00

## 2019-11-05 RX ADMIN — POTASSIUM CHLORIDE, DEXTROSE MONOHYDRATE AND SODIUM CHLORIDE: 150; 5; 450 INJECTION, SOLUTION INTRAVENOUS at 03:56

## 2019-11-05 RX ADMIN — GABAPENTIN 600 MG: 300 CAPSULE ORAL at 08:04

## 2019-11-05 RX ADMIN — ONDANSETRON 4 MG: 4 TABLET, ORALLY DISINTEGRATING ORAL at 12:49

## 2019-11-05 RX ADMIN — ACETAMINOPHEN 650 MG: 325 SOLUTION ORAL at 08:05

## 2019-11-05 RX ADMIN — OMEPRAZOLE 20 MG: KIT at 08:04

## 2019-11-05 RX ADMIN — GABAPENTIN 600 MG: 300 CAPSULE ORAL at 13:32

## 2019-11-05 ASSESSMENT — PAIN DESCRIPTION - DESCRIPTORS: DESCRIPTORS: ACHING;CONSTANT;HEAVINESS
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
## 2019-11-05 NOTE — PROGRESS NOTES
Observation goals PRIOR TO DISCHARGE      -diagnostic tests and consults completed and resulted: met.   -vital signs normal or at patient baseline: met  -tolerating oral intake to maintain hydration: met   - CT surgery eval- met.

## 2019-11-05 NOTE — PLAN OF CARE
Status: Pt on 6a s/p esophageal surgery on 10/16 after ingesting paperclips. Now admitted with swallowing complaints and esophageal pain.   Vitals: VSS.    Neuros: A&Ox4.  IV: Chest port infusing D5 1/2 NS w/ 20 mEq K at 75 ml/hr.   Resp/trach: On RA.   Diet: Advanced to full liquid diet, tolerating fairly. Mild nausea earlier in the shift. Given IV Compazine with relief of nausea.    Bowel status: last BM 11/4/2019. BS audible and normoactive.   : Voiding spontaneously.   Pain: Esophageal, back, flank pain managed with Tylenol, PO Dilaudid. Lidocaine swish available PRN.   Activity: SBA, GB and walker.     Social: No visitors this shift.   Plan: Continue to monitor and follow POC.

## 2019-11-05 NOTE — PLAN OF CARE
Status: Pt on 6a s/p esophageal surgery on 10/16 after ingesting paperclips. Now admitted with swallowing complaints and esophageal pain.   Vitals: Hypertensive at 0700 /90 after going to restroom. Other VS stable  Neuros: A&O x4.Flat affect  IV: Chest port infusing D5 1/2 NS w/ 20 mEq K at 75 ml/hr.   Resp: On RA.   Diet: Full liquid diet, only water with meds this shift. No c/o of nausea this shift  Bowel status: last BM 11/4/2019. BS audible and normoactive.   : Voiding spontaneously. Pt did have one episode of incontinence early in the shift.  Pain: Esophageal, back, flank pain managed with PRN Tylenol, & PO Dilaudid. Lidocaine swish available PRN.   Activity: SBA, GB and walker. Took lower body shower after the incontinence.   Social: No visitors this shift.   Plan: Observation goals. Continue to monitor and follow POC.

## 2019-11-05 NOTE — PROGRESS NOTES
Observation goals PRIOR TO DISCHARGE      -diagnostic tests and consults completed and resulted: met.   -vital signs normal or at patient baseline: met  -tolerating oral intake to maintain hydration: met - PO intake improving (on Full Liquid diet)  - CT surgery eval- met.     Nurse to notify provider when observation goals have been met and patient is ready for discharge. Providers are aware, plan for discharge tomorrow.

## 2019-11-05 NOTE — PLAN OF CARE
Observation goals PRIOR TO DISCHARGE     Comments:      -diagnostic tests and consults completed and resulted: met.   -vital signs normal or at patient baseline: Goal met  -tolerating oral intake to maintain hydration: met- PO intake improving, ate 100% of dinner full liquid diet.    - CT surgery eval- met.     Nurse to notify provider when observation goals have been met and patient is ready for discharge.

## 2019-11-05 NOTE — PROGRESS NOTES
CLINICAL NUTRITION SERVICES - ASSESSMENT NOTE     Nutrition Prescription    RECOMMENDATIONS FOR MDs/PROVIDERS TO ORDER:  - Recommend scheduling antiemetics - Zofran,  at least 1 hr before pt eats to help prevent onset of nausea and allow for better po intakes.  - If nausea tends to be more anticipatory, then would recommend trialing ativan before meals (per recommendation by Pharmacist).    Malnutrition Status:    Severe malnutrition in the context of acute illness    Recommendations already ordered by Registered Dietitian (RD):  Order lab add on to check Mg and PO4 for review    Future/Additional Recommendations:  May need to consider initiating enteral support to optimize pt's nutritional intakes if wt loss continues.     REASON FOR ASSESSMENT  Nevin Alvarado is a/an 28 year old female assessed by the dietitian for Provider Order - liquid diet recommendations s/p esophageal perforation and stent in place,    NUTRITION HISTORY  Per chart review, noted pt has been hospitalized twice PTA (from 10/15 thru 10/17 and 10/6 thru 10/11). Per chart review, pt presentes for evaluation of continued pain in her throat with her known esophageal stent. Has history of esophageal perforation 2/2 foreign body ingestion s/p stent placement on 10/6. Outside imaging shows adequate placement of stent. Admitted for pain control. H/o eating disorder (in remission). Pt c/o sore throat and trouble swallowing.    CURRENT NUTRITION ORDERS  Diet: Full Liquid with adv to Mechanical/Dental Soft as of 10/4  Intake/Tolerance: Ate 100% of her broth and cream soup at noon, but c/o'ing of nausea soon after eating. Pt unclear what triggers onset of nausea. Denies vomiting.     LABS  BUN 3 (low) today; suggestive of poor protein intakes    MEDICATIONS  Zofran ordered every 6 hrs prn. Per review of MAR, pt last received yesterday at 1343.  Compazine ordered every 6 hrs - last received yesterday at 1736    ANTHROPOMETRICS  Height: 0 cm (Data  "Unavailable). Height of 157.5 cm (5'2\") per chart review.  Most Recent Weight: 116 kg (255 lb 11.7 oz) today. Admit wt = 113.4 kg (250 lbs) on 11/3 (lowest wt this admission)  IBW: 50 kg  BMI: Obesity Grade III BMI >40  Weight History: Wt loss of 18 lbs (6.7% loss) x past 4 weeks  Wt Readings from Last 10 Encounters:   11/05/19 116 kg (255 lb 11.7 oz)   10/26/19 118.4 kg (261 lb)   10/25/19 118.4 kg (261 lb)   10/16/19 119.6 kg (263 lb 11.2 oz)   10/10/19 121.9 kg (268 lb 12.8 oz)   02/24/19 115.2 kg (254 lb)   02/21/19 115.2 kg (254 lb)   08/04/18 112.8 kg (248 lb 9.6 oz)   07/30/18 112.9 kg (249 lb)   07/27/18 112.9 kg (249 lb)         Dosing Weight: 66 kg (adjusted based on lowest wt of 113.4 kg on 11/3 and IBW)    ASSESSED NUTRITION NEEDS  Estimated Energy Needs: 8611-4927 kcals/day (20 - 25 kcals/kg)  Justification: Obese  Estimated Protein Needs:  grams protein/day (1.2 - 1.5 grams of pro/kg)  Justification: Repletion  Estimated Fluid Needs: (1 mL/kcal)   Justification: Maintenance    PHYSICAL FINDINGS  See malnutrition section below.    MALNUTRITION  % Intake: </= 50% for >/= 5 days (severe)  % Weight Loss: > 5% in 1 month (severe)  Subcutaneous Fat Loss: None observed  Muscle Loss: None observed  Fluid Accumulation/Edema: None noted  Malnutrition Diagnosis: Severe malnutrition in the context of acute illness    NUTRITION DIAGNOSIS  Unintended weight loss related to inadequate nutritional intakes secondary to pain and nausea with eating and limited diet as evidenced by wt loss of 18 lbs (6.7% loss) x past 4 weeks with consuming < 50% of needs and low BUN.    INTERVENTIONS  Implementation  Nutrition Education: Provided verbal and written diet education on guidelines of [place] diet. Encouraged po intakes with small frequent meals every 2-3 hrs, in addition to utilizing ONS to help ensure adequate calorie and protein intakes for wt maintenance.  Medical food supplement therapy     Goals  1. Patient to consume " % of nutritionally adequate meal trays TID, or the equivalent with supplements/snacks.   2. Wt not to decline < 113 kg    Monitoring/Evaluation  Progress toward goals will be monitored and evaluated per protocol.  Valencia Hubbard RD,LD  Unit pager [phone]

## 2019-11-05 NOTE — PLAN OF CARE
Observation goals PRIOR TO DISCHARGE     Comments:      -diagnostic tests and consults completed and resulted: met.   -vital signs normal or at patient baseline: Goal met  -tolerating oral intake to maintain hydration: met- PO intake improving, ate 100% of dinner full liquid diet.    - CT surgery eval- met.     Nurse to notify provider when observation goals have been met and patient is ready for discharge. Providers are aware, plan for discharge tomorrow.

## 2019-11-05 NOTE — PLAN OF CARE
Status: Pt on 6a s/p esophageal surgery on 10/16 after ingesting paperclips. See MD note for PMH.  Vitals: BP (!) 140/85 (BP Location: Left arm)   Pulse 90   Temp 98.8  F (37.1  C) (Oral)   Resp 18   Wt 116 kg (255 lb 11.7 oz)   SpO2 98%   BMI 46.77 kg/m    Neuros: A&Ox4.  IV: PIV infusing D5 1/2 NS w/ 20 mEq K at 75 ml/hr.   Resp/trach: On RA.   Diet: Full liquids, ADAT.   Bowel status: No BM this shift.  : Voiding spontaneously.   Pain: Throat, back, flank pain managed with tylenol, aqua K pad. Complains of pain upon swallowing.   Activity: Up w 1/GB/walker. Ambulated halls x1 this shift.   Social: No visitors this shift.   Plan: Ready for discharge. Needs pants, should be coming up from supplies.

## 2019-11-06 NOTE — PROGRESS NOTES
"[facility]: Post-Discharge Note  SITUATION                                                      Admission:    Admission Date: 11/03/19   Reason for Admission: Dysphagia, unspecified type  Discharge:   Discharge Date: 11/05/19  Discharge Diagnosis: Dysphagia, unspecified type  Discharge Service: Internal Medicine     BACKGROUND                                                      Nevin is a 28 year old female who has a history of multiple foreign  Body ingestions, depression, anxiety, esophageal stent placement 10/9 for esophageal perforation 2/2 foreign body ingestion, who was recently admitted 10/15-10/17 for abdominal pain. She presents today with concerns of LUQ pain.      She states the pain has progressively worsened over the past 2-3 days, she has baseline pain rated 7-8 every day, constant, since the stent placement. When she eats or drinks (soft diet), she has increased pain to a 10. She has tried tylenol, ibuprofen, and oxycodone at home but this has not helped. She has had morphine in the ED, without any change in her pain. She states dilaudid did help reduce her pain to a 5 or 6 this evening. She denies ingesting or inserting foreign bodies, she states her last ingestion was paperclip prior to stent placement.     Nevin's biggest concern today is pain control and she is worried about not being able to tolerate adequate oral intake. She reports losing 20lbs over the last month - she desires weight loss \"but not in this way.\" She has a history of eating disorder - reports not eating for days at a time, then binging and purging - last ED behavior was 2 years ago. She is worried this will trigger her ED.      Patient lives in independent living with [employer] workers as staff. She identifies her support as [employer] workers and her therapist who comes to her independent living for weekly visits.      Patient presented to [facility], who transferred patient here given [facility] thoracic surgery team " "managing esophageal stent, which is scheduled for removal 12/3/2019.      CT at OSH report:  IMPRESSION:  1.  Esophageal stent in situ. Metallic densities at the superior aspect and inferior aspect of the esophageal stent, interval decrease in number of foreign bodies. Implanted right chest port, catheter via IJ access, tip in the upper right atrium, unchanged.  2.  Metallic foreign body in the proximal sigmoid colon seen previously is no longer present. Distal colonic mild diverticulosis without acute inflammation. Normal appendix. Anteverted uterus containing an IUCD.  3.  Small ventral abdominal wall fat fluid collection, unchanged. Thoracolumbar curve.    ASSESSMENT      Discharge Assessment  Patient reports symptoms are: Unchanged(\"Shanika sore throat\". No new or worsening symptoms. )  Does the patient have all of their medications?: Yes  Does patient know what their new medications are for?: Yes  Does patient have a follow-up appointment scheduled?: No  Does patient have any other questions or concerns?: No    Post-op  Did the patient have surgery or a procedure: No  Fever: No  Chills: No  Eating & Drinking: eating and drinking without complaints/concerns  Bowel Function: normal  Urinary Status: voiding without complaint/concerns    PLAN                                                      Outpatient Plan:      Follow up with PCP within the next 7 days for hospital follow up.   Follow up with Psychiatry on 11/20/19.   Follow up with Thoracic Surgery on 12/03/19 for esophageal stent revision.     No future appointments.        Saskia Parsons CMA                  "

## 2019-11-07 RX ORDER — SUCRALFATE ORAL 1 G/10ML
1 SUSPENSION ORAL 4 TIMES DAILY
Qty: 420 ML | Refills: 1 | Status: SHIPPED | OUTPATIENT
Start: 2019-11-07 | End: 2020-01-18

## 2019-11-08 ENCOUNTER — NURSE TRIAGE (OUTPATIENT)
Dept: NURSING | Facility: CLINIC | Age: 29
End: 2019-11-08

## 2019-11-08 ENCOUNTER — APPOINTMENT (OUTPATIENT)
Dept: GENERAL RADIOLOGY | Facility: CLINIC | Age: 29
End: 2019-11-08
Attending: EMERGENCY MEDICINE
Payer: COMMERCIAL

## 2019-11-08 ENCOUNTER — HOSPITAL ENCOUNTER (EMERGENCY)
Facility: CLINIC | Age: 29
Discharge: HOME | End: 2019-11-08
Attending: EMERGENCY MEDICINE | Admitting: EMERGENCY MEDICINE
Payer: COMMERCIAL

## 2019-11-08 VITALS
DIASTOLIC BLOOD PRESSURE: 84 MMHG | HEIGHT: 62 IN | WEIGHT: 254 LBS | SYSTOLIC BLOOD PRESSURE: 119 MMHG | TEMPERATURE: 98.8 F | RESPIRATION RATE: 18 BRPM | OXYGEN SATURATION: 99 % | BODY MASS INDEX: 46.74 KG/M2 | HEART RATE: 82 BPM

## 2019-11-08 DIAGNOSIS — F41.1 GAD (GENERALIZED ANXIETY DISORDER): ICD-10-CM

## 2019-11-08 DIAGNOSIS — F60.3 BORDERLINE PERSONALITY DISORDER (H): ICD-10-CM

## 2019-11-08 DIAGNOSIS — T18.9XXA SWALLOWED FOREIGN BODY, INITIAL ENCOUNTER: ICD-10-CM

## 2019-11-08 DIAGNOSIS — Z87.821: ICD-10-CM

## 2019-11-08 DIAGNOSIS — F43.10 PTSD (POST-TRAUMATIC STRESS DISORDER): ICD-10-CM

## 2019-11-08 DIAGNOSIS — F32.9 MDD (MAJOR DEPRESSIVE DISORDER): ICD-10-CM

## 2019-11-08 LAB
ANION GAP SERPL CALCULATED.3IONS-SCNC: 7 MMOL/L (ref 3–14)
BUN SERPL-MCNC: 5 MG/DL (ref 7–30)
CALCIUM SERPL-MCNC: 9 MG/DL (ref 8.5–10.1)
CHLORIDE SERPL-SCNC: 109 MMOL/L (ref 94–109)
CO2 SERPL-SCNC: 23 MMOL/L (ref 20–32)
CREAT SERPL-MCNC: 0.5 MG/DL (ref 0.52–1.04)
ERYTHROCYTE [DISTWIDTH] IN BLOOD BY AUTOMATED COUNT: 14.3 % (ref 10–15)
ETHANOL SERPL-MCNC: <0.01 G/DL
GFR SERPL CREATININE-BSD FRML MDRD: >90 ML/MIN/{1.73_M2}
GLUCOSE SERPL-MCNC: 103 MG/DL (ref 70–99)
HCT VFR BLD AUTO: 36.6 % (ref 35–47)
HGB BLD-MCNC: 11.6 G/DL (ref 11.7–15.7)
MCH RBC QN AUTO: 28.4 PG (ref 26.5–33)
MCHC RBC AUTO-ENTMCNC: 31.7 G/DL (ref 31.5–36.5)
MCV RBC AUTO: 90 FL (ref 78–100)
PLATELET # BLD AUTO: 286 10E9/L (ref 150–450)
POTASSIUM SERPL-SCNC: 3.3 MMOL/L (ref 3.4–5.3)
RBC # BLD AUTO: 4.09 10E12/L (ref 3.8–5.2)
SODIUM SERPL-SCNC: 138 MMOL/L (ref 133–144)
WBC # BLD AUTO: 9.3 10E9/L (ref 4–11)

## 2019-11-08 PROCEDURE — 96374 THER/PROPH/DIAG INJ IV PUSH: CPT | Performed by: EMERGENCY MEDICINE

## 2019-11-08 PROCEDURE — 25000132 ZZH RX MED GY IP 250 OP 250 PS 637: Performed by: STUDENT IN AN ORGANIZED HEALTH CARE EDUCATION/TRAINING PROGRAM

## 2019-11-08 PROCEDURE — 80320 DRUG SCREEN QUANTALCOHOLS: CPT | Performed by: STUDENT IN AN ORGANIZED HEALTH CARE EDUCATION/TRAINING PROGRAM

## 2019-11-08 PROCEDURE — 74018 RADEX ABDOMEN 1 VIEW: CPT

## 2019-11-08 PROCEDURE — 25000132 ZZH RX MED GY IP 250 OP 250 PS 637: Performed by: EMERGENCY MEDICINE

## 2019-11-08 PROCEDURE — 71046 X-RAY EXAM CHEST 2 VIEWS: CPT

## 2019-11-08 PROCEDURE — 99285 EMERGENCY DEPT VISIT HI MDM: CPT | Mod: GC | Performed by: EMERGENCY MEDICINE

## 2019-11-08 PROCEDURE — 85027 COMPLETE CBC AUTOMATED: CPT | Performed by: STUDENT IN AN ORGANIZED HEALTH CARE EDUCATION/TRAINING PROGRAM

## 2019-11-08 PROCEDURE — 96361 HYDRATE IV INFUSION ADD-ON: CPT | Performed by: EMERGENCY MEDICINE

## 2019-11-08 PROCEDURE — 90791 PSYCH DIAGNOSTIC EVALUATION: CPT

## 2019-11-08 PROCEDURE — 96375 TX/PRO/DX INJ NEW DRUG ADDON: CPT | Performed by: EMERGENCY MEDICINE

## 2019-11-08 PROCEDURE — 80048 BASIC METABOLIC PNL TOTAL CA: CPT | Performed by: STUDENT IN AN ORGANIZED HEALTH CARE EDUCATION/TRAINING PROGRAM

## 2019-11-08 PROCEDURE — 25800030 ZZH RX IP 258 OP 636: Performed by: STUDENT IN AN ORGANIZED HEALTH CARE EDUCATION/TRAINING PROGRAM

## 2019-11-08 PROCEDURE — 25000128 H RX IP 250 OP 636: Performed by: EMERGENCY MEDICINE

## 2019-11-08 PROCEDURE — 99285 EMERGENCY DEPT VISIT HI MDM: CPT | Mod: 25 | Performed by: EMERGENCY MEDICINE

## 2019-11-08 PROCEDURE — 25000128 H RX IP 250 OP 636: Performed by: STUDENT IN AN ORGANIZED HEALTH CARE EDUCATION/TRAINING PROGRAM

## 2019-11-08 RX ORDER — METHOCARBAMOL 500 MG/1
500 TABLET, FILM COATED ORAL 4 TIMES DAILY
Status: DISCONTINUED | OUTPATIENT
Start: 2019-11-08 | End: 2019-11-08

## 2019-11-08 RX ORDER — ONDANSETRON 2 MG/ML
8 INJECTION INTRAMUSCULAR; INTRAVENOUS EVERY 6 HOURS PRN
Status: DISCONTINUED | OUTPATIENT
Start: 2019-11-08 | End: 2019-11-09 | Stop reason: HOSPADM

## 2019-11-08 RX ORDER — ONDANSETRON 4 MG/1
4 TABLET, ORALLY DISINTEGRATING ORAL ONCE
Status: COMPLETED | OUTPATIENT
Start: 2019-11-08 | End: 2019-11-08

## 2019-11-08 RX ORDER — POTASSIUM CHLORIDE 1.5 G/1.58G
20 POWDER, FOR SOLUTION ORAL 2 TIMES DAILY
Qty: 30 PACKET | Refills: 0 | Status: ON HOLD | OUTPATIENT
Start: 2019-11-08 | End: 2019-12-03

## 2019-11-08 RX ORDER — METHOCARBAMOL 100 MG/ML
500 INJECTION, SOLUTION INTRAMUSCULAR; INTRAVENOUS ONCE
Status: COMPLETED | OUTPATIENT
Start: 2019-11-08 | End: 2019-11-08

## 2019-11-08 RX ORDER — DIPHENHYDRAMINE HCL 12.5MG/5ML
25 LIQUID (ML) ORAL ONCE
Status: COMPLETED | OUTPATIENT
Start: 2019-11-08 | End: 2019-11-08

## 2019-11-08 RX ORDER — OXYCODONE HYDROCHLORIDE 5 MG/1
5 TABLET ORAL ONCE
Status: COMPLETED | OUTPATIENT
Start: 2019-11-08 | End: 2019-11-08

## 2019-11-08 RX ORDER — DIPHENHYDRAMINE HCL 25 MG
25 CAPSULE ORAL ONCE
Status: DISCONTINUED | OUTPATIENT
Start: 2019-11-08 | End: 2019-11-08

## 2019-11-08 RX ORDER — ACETAMINOPHEN 325 MG/1
975 TABLET ORAL EVERY 4 HOURS PRN
Status: DISCONTINUED | OUTPATIENT
Start: 2019-11-08 | End: 2019-11-08

## 2019-11-08 RX ORDER — KETOROLAC TROMETHAMINE 15 MG/ML
15 INJECTION, SOLUTION INTRAMUSCULAR; INTRAVENOUS ONCE
Status: COMPLETED | OUTPATIENT
Start: 2019-11-08 | End: 2019-11-08

## 2019-11-08 RX ORDER — HEPARIN SODIUM (PORCINE) LOCK FLUSH IV SOLN 100 UNIT/ML 100 UNIT/ML
5 SOLUTION INTRAVENOUS ONCE
Status: COMPLETED | OUTPATIENT
Start: 2019-11-08 | End: 2019-11-08

## 2019-11-08 RX ADMIN — ONDANSETRON 4 MG: 4 TABLET, ORALLY DISINTEGRATING ORAL at 22:44

## 2019-11-08 RX ADMIN — Medication 5 ML: at 22:44

## 2019-11-08 RX ADMIN — SODIUM CHLORIDE 500 ML: 9 INJECTION, SOLUTION INTRAVENOUS at 20:37

## 2019-11-08 RX ADMIN — OXYCODONE HYDROCHLORIDE 5 MG: 5 TABLET ORAL at 19:29

## 2019-11-08 RX ADMIN — DIPHENHYDRAMINE HYDROCHLORIDE 25 MG: 25 SOLUTION ORAL at 20:57

## 2019-11-08 RX ADMIN — KETOROLAC TROMETHAMINE 15 MG: 15 INJECTION, SOLUTION INTRAMUSCULAR; INTRAVENOUS at 18:15

## 2019-11-08 RX ADMIN — ONDANSETRON 8 MG: 2 INJECTION INTRAMUSCULAR; INTRAVENOUS at 18:20

## 2019-11-08 RX ADMIN — METHOCARBAMOL 500 MG: 100 INJECTION, SOLUTION INTRAMUSCULAR; INTRAVENOUS at 18:15

## 2019-11-08 RX ADMIN — ACETAMINOPHEN 1000 MG: 325 SOLUTION ORAL at 18:21

## 2019-11-08 ASSESSMENT — MIFFLIN-ST. JEOR: SCORE: 1840.39

## 2019-11-08 NOTE — ED PROVIDER NOTES
[facility] EMERGENCY DEPARTMENT ([facility])  November 9, 2019    History     Chief Complaint   Patient presents with     Swallowed Foreign Body     spring     Nausea & Vomiting     Depression     HPI  Nevin Alvarado is a 28 year old female past medical history of MDD, borderline personality disorder, anxiety, PTSD, history of multiple ingestions complicated by esophageal perforation from EGD with foreign body removal now s/p stent placement (10/9/2019) who presents today after ingesting a metal spring from a pen.  Patient states it was not a planned action, it was a compulsion.  She had no intent on harming herself or killing herself.  Currently denies intention of harming herself or killing herself.  She also endorses significant left upper back pain since esophageal stent was placed.  Pain is described as 8/10, stabbing, occasionally radiating around left chest wall.  Pain is worse when she lies flat.  It is not improved with Tylenol or ibuprofen.  Warm packs occasionally help.  Her throat feels scratchy since the ingestion.  One small episode of coughing which resulted in very small amount of blood, has not persisted.  Associated nausea, Zofran and Compazine with little relief.  She is eating less food because of the nausea. Denies fever/chills, lightheadedness, dizziness, chest pain, SOB, abdominal pain, myalgias or arthralgias, edema. Patient has multiple ED visits for intentional ingestions and chronic chest and back pains.     I have reviewed the Medications, Allergies, Past Medical and Surgical History, and Social History in the Epic system.    Review of Systems   Constitutional: Negative for chills, fatigue and fever.   HENT: Positive for sore throat. Negative for congestion.    Respiratory: Negative for chest tightness and shortness of breath.    Cardiovascular: Negative for chest pain, palpitations and leg swelling.   Gastrointestinal: Negative for abdominal distention and abdominal  "pain.   Endocrine: Negative for cold intolerance and heat intolerance.   Genitourinary: Negative for difficulty urinating, dysuria, flank pain and urgency.   Musculoskeletal: Positive for back pain. Negative for arthralgias and myalgias.   Skin: Negative for rash and wound.       Physical Exam   BP: (!) 161/105  Pulse: 100  Temp: 98.8  F (37.1  C)  Resp: 16  Height: 157.5 cm (5' 2\")  Weight: 115.2 kg (254 lb)  SpO2: 100 %      Physical Exam   Constitutional:  No acute distress, nontoxic. Siting upright in chair. On phone. Cooperative.  HEENT: EOMI, MMM, oropharynx nonerythematous.   Neck: full ROM.  Cardiovascular: RRR no murmurs rubs or gallops. No LE edema. Pulses 2+ bilaterally.   Pulmonary: CTAB no wheezes or crackles. Normal respiratory effort on RA.  Abdominal: bowel sounds present. Nontender, nondistended. No masses.   Back: tender to palpation midline T2-T5, and along L upper back. No fluctuance, crepitus, or tenderness.   Extremities: atraumatic, no deformities.  Skin: no rashes, bruises or lesions  Neuro: Spontaneously moving all four extremities. Alert and oriented. Follows commands    ED Course     Labs Ordered and Resulted from Time of ED Arrival Up to the Time of Departure from the ED   CBC WITH PLATELETS - Abnormal; Notable for the following components:       Result Value    Hemoglobin 11.6 (*)     All other components within normal limits   BASIC METABOLIC PANEL - Abnormal; Notable for the following components:    Potassium 3.3 (*)     Glucose 103 (*)     Urea Nitrogen 5 (*)     Creatinine 0.50 (*)     All other components within normal limits   ALCOHOL ETHYL   HCG QUALITATIVE URINE   DRUG ABUSE SCREEN 6 CHEM DEP URINE ([facility])            Assessments & Plan (with Medical Decision Making)   28-year-old female presenting with ingestion of foreign body in the setting of history of ingestion of foreign bodies, complicated by ruptured esophagus s/p stent.  VSS on arrival.  CXR showed no foreign body within " the chest.  KUB showed serpiginous radiodensity over left upper abdomen over stomach, with additional radiodensity over R lateral pelvis - new from prior studies suggesting possible additional ingested foreign body.  Left upper back pain was reproducible on exam, unlikely to be secondary to esophageal stent/rupture.  Patient was tolerating water and medications p.o. while in the ER.  Thoracic surgery saw patient, not concerned for any acute issues with the GI and would like her to follow-up with her scheduled appointment in December.  They found no indication for admission.  Pain was mildly improved with a combination of Toradol, acetaminophen, Robaxin, oxycodone.  Patient states she has oxycodone at home.  Patient was deemed medically clear for discharge, patient stated concern about going home and ingesting again.  She has no intention to harm herself or kill herself, but was concerned that her compulsion for ingestion would put her at danger.  This was discussed with assessment team from [facility] and psychiatry.  Found no acute indication for admission.  On review of her records she has extensive support outside of the hospital.  She was encouraged to follow-up with her outpatient support, and see her psychiatrist on her scheduled 7/20.    I have reviewed the nursing notes.    I have reviewed the findings, diagnosis, plan and need for follow up with the patient.    New Prescriptions    No medications on file       Final diagnoses:   Swallowed foreign body, initial encounter   Hx of foreign body ingestion   Borderline personality disorder (H)   CANDICE (generalized anxiety disorder)   MDD (major depressive disorder)   PTSD (post-traumatic stress disorder)     Nish Macdonald MD  Internal Medicine PGY-1    11/8/2019   [facility], EMERGENCY DEPARTMENT    This data collected with the Resident working in the Emergency Department.  Patient was seen and evaluated by myself and I repeated the history and physical  exam with the patient.  The plan of care was discussed with them.  The key portions of the note including the entire assessment and plan reflect my documentation.    DO Loyda Mendoza Amanda, DO  11/09/19 1537

## 2019-11-08 NOTE — TELEPHONE ENCOUNTER
FNA triage call :  PCP is [facility] .   Presenting problem : Pt called.  [facility] , because Dx perforation 10/5/19 with  [facility] thoracic surgeon  Timoteo Oneill .    Upper left back pain with deep breath or swallow or moving for 3- 4 weeks , difficulty sleeping due to intensity of pain and have most problem with sleeping when sleeping and 1st thing in morning , can't quite catch my breath  .  Currently : no fever or injury ,  Intermittent upper back pain up to 8-9/10- radiated into chest fleeting for up to 2 minutes   , now Back pain is 5/10 on painscale.  Taking oxycodone every other day as needed  Or tylenol for pain .   Guideline used : back pain A OH - then  chest pain A OH.    Disposition and recommendations :  Return to [facility] Ed  But Pt prefers to speak to on- call provider for her  Thoracic surgeon before deciding  . [facility]  page   [place]   To page Dr CLAYTON Saavedra  To Pt's phone  [phone] . Advised Pt to call   [phone] press 0 for  and ask repage if no response to this page  In 20 minutes .   Caller verbalizes understanding and denies further questions and will call back if further symptoms to triage or questions  . Humera Scott RN  - [facility] Nurse Advisor     Reason for Disposition    Pain also present in shoulder(s) or arm(s) or jaw    Additional Information    Negative: Passed out (i.e., fainted, collapsed and was not responding)    Negative: Shock suspected (e.g., cold/pale/clammy skin, too weak to stand, low BP, rapid pulse)    Negative: Sounds like a life-threatening emergency to the triager    Negative: Major injury to the back (e.g., MVA, fall > 10 feet or 3 meters, penetrating injury, etc.)    Pain in the upper back over the ribs (rib cage) that radiates (travels) into the chest    Negative: Severe difficulty breathing (e.g., struggling for each breath, speaks in single words)    Negative: Passed out (i.e., fainted, collapsed and was not responding)     Negative: Chest pain lasting longer than 5 minutes and ANY of the following:* Over 50 years old* Over 30 years old and at least one cardiac risk factor (i.e., high blood pressure, diabetes, high cholesterol, obesity, smoker or strong family history of heart disease)* Pain is crushing, pressure-like, or heavy * Took nitroglycerin and chest pain was not relieved* History of heart disease (i.e., angina, heart attack, bypass surgery, angioplasty, CHF)    Negative: Visible sweat on face or sweat dripping down face    Negative: Sounds like a life-threatening emergency to the triager    Negative: Followed an injury to chest    Negative: SEVERE chest pain    Protocols used: CHEST PAIN-A-OH, BACK PAIN-A-OH

## 2019-11-08 NOTE — ED TRIAGE NOTES
"Triage Assessment & Note:    BP (!) 161/105   Pulse 100   Temp 98.8  F (37.1  C) (Oral)   Resp 16   Ht 1.575 m (5' 2\")   Wt 115.2 kg (254 lb)   SpO2 100%   BMI 46.46 kg/m        Patient presents with: Pt comes to triage with reports of swallowing a metal spring. No reports of fever, cough, SOB, or CP    Home Treatments/Remedies: Home medication    Febrile / Afebrile: afebrile    Duration of C/o: 2 hrs, ongoing issues    Flower Johnson RN  November 8, 2019        "

## 2019-11-08 NOTE — ED AVS SNAPSHOT
[facility], [place], Emergency Department  [address] [place] [id number]  Phone:  [phone]                                    Nevin Alvarado   MRN: [id number]    Department:  [facility], Emergency Department   Date of Visit:  11/8/2019           After Visit Summary Signature Page    I have received my discharge instructions, and my questions have been answered. I have discussed any challenges I see with this plan with the nurse or doctor.    ..........................................................................................................................................  Patient/Patient Representative Signature      ..........................................................................................................................................  Patient Representative Print Name and Relationship to Patient    ..................................................               ................................................  Date                                   Time    ..........................................................................................................................................  Reviewed by Signature/Title    ...................................................              ..............................................  Date                                               Time          22EPIC Rev 08/18

## 2019-11-09 ASSESSMENT — ENCOUNTER SYMPTOMS
FEVER: 0
DIFFICULTY URINATING: 0
ARTHRALGIAS: 0
SORE THROAT: 1
WOUND: 0
MYALGIAS: 0
DYSURIA: 0
ABDOMINAL DISTENTION: 0
PALPITATIONS: 0
FLANK PAIN: 0
BACK PAIN: 1
ABDOMINAL PAIN: 0
CHILLS: 0
SHORTNESS OF BREATH: 0
CHEST TIGHTNESS: 0
FATIGUE: 0
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
## 2019-11-09 NOTE — DISCHARGE INSTRUCTIONS
Please follow up with your primary care doctor within the next 1-7 days. Please follow up with your psychiatrist on your scheduled appointment date. If your symptoms suddenly worsen, please return to the ER. If you develop thoughts of harming yourself or others, please return to the ER.

## 2019-11-18 ENCOUNTER — APPOINTMENT (OUTPATIENT)
Dept: GENERAL RADIOLOGY | Facility: CLINIC | Age: 29
End: 2019-11-18
Attending: EMERGENCY MEDICINE
Payer: COMMERCIAL

## 2019-11-18 ENCOUNTER — TELEPHONE (OUTPATIENT)
Dept: ONCOLOGY | Facility: CLINIC | Age: 29
End: 2019-11-18

## 2019-11-18 PROCEDURE — 96374 THER/PROPH/DIAG INJ IV PUSH: CPT | Performed by: EMERGENCY MEDICINE

## 2019-11-18 PROCEDURE — 96361 HYDRATE IV INFUSION ADD-ON: CPT | Performed by: EMERGENCY MEDICINE

## 2019-11-18 PROCEDURE — 99284 EMERGENCY DEPT VISIT MOD MDM: CPT | Mod: Z6 | Performed by: EMERGENCY MEDICINE

## 2019-11-18 PROCEDURE — 99284 EMERGENCY DEPT VISIT MOD MDM: CPT | Mod: 25 | Performed by: EMERGENCY MEDICINE

## 2019-11-18 PROCEDURE — 25000125 ZZHC RX 250: Performed by: EMERGENCY MEDICINE

## 2019-11-18 PROCEDURE — 25000132 ZZH RX MED GY IP 250 OP 250 PS 637: Performed by: EMERGENCY MEDICINE

## 2019-11-18 PROCEDURE — 71046 X-RAY EXAM CHEST 2 VIEWS: CPT

## 2019-11-18 RX ORDER — ONDANSETRON 2 MG/ML
8 INJECTION INTRAMUSCULAR; INTRAVENOUS ONCE
Status: COMPLETED | OUTPATIENT
Start: 2019-11-18 | End: 2019-11-19

## 2019-11-18 RX ADMIN — LIDOCAINE HYDROCHLORIDE 30 ML: 20 SOLUTION ORAL; TOPICAL at 19:42

## 2019-11-18 ASSESSMENT — MIFFLIN-ST. JEOR: SCORE: 1795.02

## 2019-11-18 NOTE — TELEPHONE ENCOUNTER
Patient called to let thoracic team know she would be going into [facility] ER around 7pm tonight.    She was told by a nurse she spoke to earlier today that she should call [facility] number to let know if she would be going in.

## 2019-11-18 NOTE — TELEPHONE ENCOUNTER
Per Judith: pt should come to [facility] ER to be evaluated and scope may be done for better visualization. Called pt and relayed information, she state she is unsure whether she will come or not, if she does she will call back to let us know so we can give report.

## 2019-11-18 NOTE — TELEPHONE ENCOUNTER
Person calling: patient    Reason for call: symptoms: increasing episodes of pain in throat, chest and back after eating. State she is on a full liquid diet and today only had water. Pain is 7/10, taking Oxy as needed, last dose 930 am. State her throat feels like it closes up on her and remains tight for hours. She's been to the ED multiple times, most recently at [place], received a CT scan and told stent was in place. Wondering if she can be seen in clinic or go back to ED. Scheduled to have stent removal 12/3. Advised will page thoracic but if she feels sob or throat constriction getting worse she should just go to ED, she verbalized understanding.    Action taken: paged the following provider(s):  Judith Kellogg, routed to thoracic care pool

## 2019-11-19 ENCOUNTER — E-VISIT (OUTPATIENT)
Dept: INTERNAL MEDICINE | Facility: CLINIC | Age: 29
End: 2019-11-19
Payer: COMMERCIAL

## 2019-11-19 ENCOUNTER — HOSPITAL ENCOUNTER (EMERGENCY)
Facility: CLINIC | Age: 29
Discharge: HOME | End: 2019-11-19
Attending: EMERGENCY MEDICINE | Admitting: EMERGENCY MEDICINE
Payer: COMMERCIAL

## 2019-11-19 VITALS
RESPIRATION RATE: 14 BRPM | OXYGEN SATURATION: 98 % | HEIGHT: 62 IN | SYSTOLIC BLOOD PRESSURE: 137 MMHG | TEMPERATURE: 98.6 F | WEIGHT: 245.1 LBS | DIASTOLIC BLOOD PRESSURE: 91 MMHG | BODY MASS INDEX: 45.11 KG/M2 | HEART RATE: 89 BPM

## 2019-11-19 DIAGNOSIS — K22.89 ESOPHAGEAL PAIN: ICD-10-CM

## 2019-11-19 DIAGNOSIS — N89.8 BLOODY VAGINAL DISCHARGE: Primary | ICD-10-CM

## 2019-11-19 LAB
ALBUMIN SERPL-MCNC: 3.8 G/DL (ref 3.4–5)
ALP SERPL-CCNC: 77 U/L (ref 40–150)
ALT SERPL W P-5'-P-CCNC: 22 U/L (ref 0–50)
ANION GAP SERPL CALCULATED.3IONS-SCNC: 8 MMOL/L (ref 3–14)
AST SERPL W P-5'-P-CCNC: 10 U/L (ref 0–45)
BASOPHILS # BLD AUTO: 0.1 10E9/L (ref 0–0.2)
BASOPHILS NFR BLD AUTO: 0.5 %
BILIRUB SERPL-MCNC: 0.4 MG/DL (ref 0.2–1.3)
BUN SERPL-MCNC: 7 MG/DL (ref 7–30)
CALCIUM SERPL-MCNC: 8.9 MG/DL (ref 8.5–10.1)
CHLORIDE SERPL-SCNC: 109 MMOL/L (ref 94–109)
CO2 SERPL-SCNC: 22 MMOL/L (ref 20–32)
CREAT SERPL-MCNC: 0.55 MG/DL (ref 0.52–1.04)
DIFFERENTIAL METHOD BLD: ABNORMAL
EOSINOPHIL # BLD AUTO: 0.3 10E9/L (ref 0–0.7)
EOSINOPHIL NFR BLD AUTO: 2.8 %
ERYTHROCYTE [DISTWIDTH] IN BLOOD BY AUTOMATED COUNT: 15.4 % (ref 10–15)
GFR SERPL CREATININE-BSD FRML MDRD: >90 ML/MIN/{1.73_M2}
GLUCOSE SERPL-MCNC: 104 MG/DL (ref 70–99)
HCT VFR BLD AUTO: 35.9 % (ref 35–47)
HGB BLD-MCNC: 11.5 G/DL (ref 11.7–15.7)
IMM GRANULOCYTES # BLD: 0 10E9/L (ref 0–0.4)
IMM GRANULOCYTES NFR BLD: 0.4 %
LIPASE SERPL-CCNC: 123 U/L (ref 73–393)
LYMPHOCYTES # BLD AUTO: 2.4 10E9/L (ref 0.8–5.3)
LYMPHOCYTES NFR BLD AUTO: 22.6 %
MCH RBC QN AUTO: 28.6 PG (ref 26.5–33)
MCHC RBC AUTO-ENTMCNC: 32 G/DL (ref 31.5–36.5)
MCV RBC AUTO: 89 FL (ref 78–100)
MONOCYTES # BLD AUTO: 0.7 10E9/L (ref 0–1.3)
MONOCYTES NFR BLD AUTO: 6.9 %
NEUTROPHILS # BLD AUTO: 7 10E9/L (ref 1.6–8.3)
NEUTROPHILS NFR BLD AUTO: 66.8 %
NRBC # BLD AUTO: 0 10*3/UL
NRBC BLD AUTO-RTO: 0 /100
PLATELET # BLD AUTO: 327 10E9/L (ref 150–450)
POTASSIUM SERPL-SCNC: 3.2 MMOL/L (ref 3.4–5.3)
PROT SERPL-MCNC: 7.4 G/DL (ref 6.8–8.8)
RBC # BLD AUTO: 4.02 10E12/L (ref 3.8–5.2)
SODIUM SERPL-SCNC: 139 MMOL/L (ref 133–144)
WBC # BLD AUTO: 10.5 10E9/L (ref 4–11)

## 2019-11-19 PROCEDURE — 99207 ZZC NO BILLABLE SERVICE THIS VISIT: CPT | Performed by: INTERNAL MEDICINE

## 2019-11-19 PROCEDURE — 25800030 ZZH RX IP 258 OP 636: Performed by: EMERGENCY MEDICINE

## 2019-11-19 PROCEDURE — 85025 COMPLETE CBC W/AUTO DIFF WBC: CPT | Performed by: EMERGENCY MEDICINE

## 2019-11-19 PROCEDURE — 25000128 H RX IP 250 OP 636: Performed by: EMERGENCY MEDICINE

## 2019-11-19 PROCEDURE — 25000132 ZZH RX MED GY IP 250 OP 250 PS 637: Performed by: EMERGENCY MEDICINE

## 2019-11-19 PROCEDURE — 83690 ASSAY OF LIPASE: CPT | Performed by: EMERGENCY MEDICINE

## 2019-11-19 PROCEDURE — 80053 COMPREHEN METABOLIC PANEL: CPT | Performed by: EMERGENCY MEDICINE

## 2019-11-19 RX ORDER — HEPARIN SODIUM (PORCINE) LOCK FLUSH IV SOLN 100 UNIT/ML 100 UNIT/ML
5 SOLUTION INTRAVENOUS
Status: DISCONTINUED | OUTPATIENT
Start: 2019-11-19 | End: 2019-11-19 | Stop reason: HOSPADM

## 2019-11-19 RX ORDER — OXYCODONE HYDROCHLORIDE 5 MG/1
5 TABLET ORAL ONCE
Status: COMPLETED | OUTPATIENT
Start: 2019-11-19 | End: 2019-11-19

## 2019-11-19 RX ADMIN — Medication 5 ML: at 04:31

## 2019-11-19 RX ADMIN — SODIUM CHLORIDE 1000 ML: 9 INJECTION, SOLUTION INTRAVENOUS at 01:24

## 2019-11-19 RX ADMIN — OXYCODONE HYDROCHLORIDE 5 MG: 5 TABLET ORAL at 02:51

## 2019-11-19 RX ADMIN — ONDANSETRON 8 MG: 2 INJECTION INTRAMUSCULAR; INTRAVENOUS at 01:25

## 2019-11-19 RX ADMIN — OXYCODONE HYDROCHLORIDE 5 MG: 5 TABLET ORAL at 04:31

## 2019-11-19 NOTE — ED AVS SNAPSHOT
[facility], [place], Emergency Department  [address] [place] [id number]  Phone:  [phone]                                    Nevin Alvarado   MRN: [id number]    Department:  [facility], Emergency Department   Date of Visit:  11/18/2019           After Visit Summary Signature Page    I have received my discharge instructions, and my questions have been answered. I have discussed any challenges I see with this plan with the nurse or doctor.    ..........................................................................................................................................  Patient/Patient Representative Signature      ..........................................................................................................................................  Patient Representative Print Name and Relationship to Patient    ..................................................               ................................................  Date                                   Time    ..........................................................................................................................................  Reviewed by Signature/Title    ...................................................              ..............................................  Date                                               Time          22EPIC Rev 08/18

## 2019-11-19 NOTE — ED TRIAGE NOTES
Patient presents ambulatory to triage c/o esophageal pain. Patient has an esophageal stent for a perforation and states her esophagus burns with eating and drinking. She also states it feels like something is lodged in her throat.
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
## 2019-11-19 NOTE — ED PROVIDER NOTES
"  History     Chief Complaint   Patient presents with     Gastrophageal Reflux     HPI  Nevin Alvarado is a 29 year old female with PMH notable for borderline personality disorder, multiple foreign body ingestions/insertions now s/p esophageal stent recently on 10/9/2019 who presents to the ED with chest pain with swallowing. Patient reports similar pain being due to her esophagus in the past. Pain for the past 2 weeks. She denies any recent ingestions. She is able to swallow her secretions. No fevers. No shortness of breath.     I have reviewed the Medications, Allergies, Past Medical and Surgical History, and Social History in the Epic system.    Review of Systems  A complete review of systems was performed with pertinent positives and negatives noted in the HPI, and all other systems negative.     Physical Exam   BP: (!) 166/86  Pulse: 102  Heart Rate: 89  Temp: 98.5  F (36.9  C)  Resp: 16  Height: 157.5 cm (5' 2\")  Weight: 111.2 kg (245 lb 1.6 oz)  SpO2: 97 %    Physical Exam  General: no acute distress. Appears stated age.   HENT: MMM, no oropharyngeal lesions  Eyes: PERRL, normal sclerae  Neck: non-tender, supple  Cardio: regular rate. Regular rhythm. Extremities well perfused  Resp: Normal work of breathing, clear breath sounds  Abdomen: no tenderness, non-distended, no rebound, no guarding  Neuro: alert and fully oriented. CN II-XII grossly intact. Grossly normal strength and sensation in all extremities.   MSK: no deformities.   Integumentary/Skin: no rash visualized, normal color  Psych: normal affect, normal behavior    ED Course      Procedures        Critical Care time:  none     Labs Ordered and Resulted from Time of ED Arrival Up to the Time of Departure from the ED   CBC WITH PLATELETS DIFFERENTIAL - Abnormal; Notable for the following components:       Result Value    Hemoglobin 11.5 (*)     RDW 15.4 (*)     All other components within normal limits   COMPREHENSIVE METABOLIC PANEL - Abnormal; " Notable for the following components:    Potassium 3.2 (*)     Glucose 104 (*)     All other components within normal limits   LIPASE     XR Chest 2 Views   Final Result   Impression:    1. Unchanged esophageal stent position.   2. No focal airspace disease.      I have personally reviewed the examination and initial interpretation   and I agree with the findings.      YEHUDA TROY MD             Assessments & Plan (with Medical Decision Making)   Patient presenting with painful swallowing in patient with esophageal stent. Vitals in the ED unremarkable.     The patient was given GI cocktail and oxycodone for pain. CXR unremarkable, showed stent in unchanged position, no visualized foreign bodies. Thoracic surgery consulted - recommended no intervention, recommended follow-up in early Dec as already scheduled.     The complete clinical picture is most consistent with painful swallowing, likely related to esophageal stent. After counseling on the diagnosis, work-up, and treatment plan, the patient was discharged to home. The patient was advised to follow-up with her PCP in a few days and with thoracic surgery in a couple weeks as already planned. The patient was advised to return to the ED if worsening symptoms, inability to swallow secretions, or if there are any urgent/life-threatening concerns.     Final diagnoses:   Esophageal pain       --  Donavon Young MD   Emergency Medicine     I have reviewed the nursing notes.  I have reviewed the findings, diagnosis, plan and need for follow up with the patient.  11/18/2019   [facility]     Donavon Young MD  11/21/19 [id number]

## 2019-11-19 NOTE — ED PROVIDER NOTES
"ED Triage Provider Note  [facility]  Encounter Date: Nov 18, 2019    History:  Chief Complaint   Patient presents with     Gastrophageal Reflux     Nevin Alvarado is a 29 year old female who presents to the ED with substernal chest pain X 2 weeks.  She has a history of swallowing foreign objects.  Had an esophageal perforation during removal of paperclip.  Now has esophageal stent in place.  She is reporting pain over the esophagus area for 2 weeks with nausea and vomiting after she eats or drinks anything. She denies swallowing any new foreign body in the last 2 weeks but has a long history of this as well as placing foreign bodies in her rectum.    Review of Systems:  No shortness of breath.     Exam:  BP (!) 166/86   Pulse 102   Temp 98.5  F (36.9  C) (Oral)   Resp 16   Ht 1.575 m (5' 2\")   Wt 111.2 kg (245 lb 1.6 oz)   SpO2 97%   BMI 44.83 kg/m    General: No acute distress. Appears stated age.   Cardio: Regular rate, extremities well perfused  Resp: Normal work of breathing, grossly normal respiratory rate  Neuro: Alert. CN II-XII grossly intact. Grossly intact strength.       Medical Decision Making:  Patient arriving to the ED with problem as above. A medical screening exam was performed. XR orders initiated from Triage. The patient is appropriate to wait in triage.  Will give GI cocktail for her pain and do chest x-ray to look at stent position.       Tea Paniagua MD on 11/18/2019 at 7:21 PM       Tea Paniagua MD  11/18/19 [id number]    "

## 2019-11-28 ENCOUNTER — APPOINTMENT (OUTPATIENT)
Dept: CT IMAGING | Facility: CLINIC | Age: 29
DRG: 176 | End: 2019-11-28
Attending: EMERGENCY MEDICINE
Payer: COMMERCIAL

## 2019-11-28 ENCOUNTER — HOSPITAL ENCOUNTER (INPATIENT)
Facility: CLINIC | Age: 29
LOS: 5 days | Discharge: HOME | DRG: 176 | End: 2019-12-05
Attending: EMERGENCY MEDICINE | Admitting: STUDENT IN AN ORGANIZED HEALTH CARE EDUCATION/TRAINING PROGRAM
Payer: COMMERCIAL

## 2019-11-28 DIAGNOSIS — I26.99 ACUTE PULMONARY EMBOLISM WITHOUT ACUTE COR PULMONALE, UNSPECIFIED PULMONARY EMBOLISM TYPE (H): ICD-10-CM

## 2019-11-28 DIAGNOSIS — R63.0 POOR APPETITE: Primary | ICD-10-CM

## 2019-11-28 DIAGNOSIS — K22.2 ESOPHAGEAL STRICTURE: ICD-10-CM

## 2019-11-28 LAB
ALBUMIN SERPL-MCNC: 3.5 G/DL (ref 3.4–5)
ALBUMIN UR-MCNC: NEGATIVE MG/DL
ALP SERPL-CCNC: 75 U/L (ref 40–150)
ALT SERPL W P-5'-P-CCNC: 28 U/L (ref 0–50)
ANION GAP SERPL CALCULATED.3IONS-SCNC: 6 MMOL/L (ref 3–14)
APPEARANCE UR: CLEAR
AST SERPL W P-5'-P-CCNC: 12 U/L (ref 0–45)
BASOPHILS # BLD AUTO: 0.1 10E9/L (ref 0–0.2)
BASOPHILS NFR BLD AUTO: 0.5 %
BILIRUB SERPL-MCNC: 0.3 MG/DL (ref 0.2–1.3)
BILIRUB UR QL STRIP: NEGATIVE
BUN SERPL-MCNC: 10 MG/DL (ref 7–30)
CALCIUM SERPL-MCNC: 9.3 MG/DL (ref 8.5–10.1)
CHLORIDE SERPL-SCNC: 109 MMOL/L (ref 94–109)
CO2 SERPL-SCNC: 25 MMOL/L (ref 20–32)
COLOR UR AUTO: YELLOW
CREAT SERPL-MCNC: 0.54 MG/DL (ref 0.52–1.04)
DIFFERENTIAL METHOD BLD: ABNORMAL
EOSINOPHIL # BLD AUTO: 0.1 10E9/L (ref 0–0.7)
EOSINOPHIL NFR BLD AUTO: 1.1 %
ERYTHROCYTE [DISTWIDTH] IN BLOOD BY AUTOMATED COUNT: 15.2 % (ref 10–15)
GFR SERPL CREATININE-BSD FRML MDRD: >90 ML/MIN/{1.73_M2}
GLUCOSE SERPL-MCNC: 103 MG/DL (ref 70–99)
GLUCOSE UR STRIP-MCNC: NEGATIVE MG/DL
HCG SERPL QL: NEGATIVE
HCT VFR BLD AUTO: 36.2 % (ref 35–47)
HGB BLD-MCNC: 11.4 G/DL (ref 11.7–15.7)
HGB UR QL STRIP: NEGATIVE
IMM GRANULOCYTES # BLD: 0 10E9/L (ref 0–0.4)
IMM GRANULOCYTES NFR BLD: 0.3 %
INR PPP: 1.1 (ref 0.86–1.14)
KETONES UR STRIP-MCNC: NEGATIVE MG/DL
LACTATE BLD-SCNC: 1 MMOL/L (ref 0.7–2)
LEUKOCYTE ESTERASE UR QL STRIP: NEGATIVE
LYMPHOCYTES # BLD AUTO: 2.6 10E9/L (ref 0.8–5.3)
LYMPHOCYTES NFR BLD AUTO: 26.1 %
MCH RBC QN AUTO: 28.5 PG (ref 26.5–33)
MCHC RBC AUTO-ENTMCNC: 31.5 G/DL (ref 31.5–36.5)
MCV RBC AUTO: 91 FL (ref 78–100)
MONOCYTES # BLD AUTO: 0.7 10E9/L (ref 0–1.3)
MONOCYTES NFR BLD AUTO: 7.4 %
NEUTROPHILS # BLD AUTO: 6.4 10E9/L (ref 1.6–8.3)
NEUTROPHILS NFR BLD AUTO: 64.6 %
NITRATE UR QL: NEGATIVE
NRBC # BLD AUTO: 0 10*3/UL
NRBC BLD AUTO-RTO: 0 /100
PH UR STRIP: 5 PH (ref 5–7)
PLATELET # BLD AUTO: 310 10E9/L (ref 150–450)
POTASSIUM SERPL-SCNC: 3.4 MMOL/L (ref 3.4–5.3)
PROT SERPL-MCNC: 7.1 G/DL (ref 6.8–8.8)
RBC # BLD AUTO: 4 10E12/L (ref 3.8–5.2)
RBC #/AREA URNS AUTO: 0 /HPF (ref 0–2)
SODIUM SERPL-SCNC: 141 MMOL/L (ref 133–144)
SOURCE: ABNORMAL
SP GR UR STRIP: 1.01 (ref 1–1.03)
SQUAMOUS #/AREA URNS AUTO: 2 /HPF (ref 0–1)
UROBILINOGEN UR STRIP-MCNC: NORMAL MG/DL (ref 0–2)
WBC # BLD AUTO: 9.9 10E9/L (ref 4–11)
WBC #/AREA URNS AUTO: 0 /HPF (ref 0–5)

## 2019-11-28 PROCEDURE — 71275 CT ANGIOGRAPHY CHEST: CPT

## 2019-11-28 PROCEDURE — 83605 ASSAY OF LACTIC ACID: CPT | Performed by: EMERGENCY MEDICINE

## 2019-11-28 PROCEDURE — 96374 THER/PROPH/DIAG INJ IV PUSH: CPT | Mod: 59 | Performed by: EMERGENCY MEDICINE

## 2019-11-28 PROCEDURE — 25000132 ZZH RX MED GY IP 250 OP 250 PS 637: Performed by: EMERGENCY MEDICINE

## 2019-11-28 PROCEDURE — 99285 EMERGENCY DEPT VISIT HI MDM: CPT | Mod: Z6 | Performed by: EMERGENCY MEDICINE

## 2019-11-28 PROCEDURE — 84703 CHORIONIC GONADOTROPIN ASSAY: CPT | Performed by: EMERGENCY MEDICINE

## 2019-11-28 PROCEDURE — 96376 TX/PRO/DX INJ SAME DRUG ADON: CPT | Performed by: EMERGENCY MEDICINE

## 2019-11-28 PROCEDURE — 25000128 H RX IP 250 OP 636: Performed by: EMERGENCY MEDICINE

## 2019-11-28 PROCEDURE — 81001 URINALYSIS AUTO W/SCOPE: CPT | Performed by: EMERGENCY MEDICINE

## 2019-11-28 PROCEDURE — 85025 COMPLETE CBC W/AUTO DIFF WBC: CPT | Performed by: EMERGENCY MEDICINE

## 2019-11-28 PROCEDURE — 99285 EMERGENCY DEPT VISIT HI MDM: CPT | Mod: 25 | Performed by: EMERGENCY MEDICINE

## 2019-11-28 PROCEDURE — 25800030 ZZH RX IP 258 OP 636: Performed by: EMERGENCY MEDICINE

## 2019-11-28 PROCEDURE — 80053 COMPREHEN METABOLIC PANEL: CPT | Performed by: EMERGENCY MEDICINE

## 2019-11-28 PROCEDURE — 96375 TX/PRO/DX INJ NEW DRUG ADDON: CPT | Performed by: EMERGENCY MEDICINE

## 2019-11-28 PROCEDURE — 96361 HYDRATE IV INFUSION ADD-ON: CPT | Performed by: EMERGENCY MEDICINE

## 2019-11-28 PROCEDURE — 85610 PROTHROMBIN TIME: CPT | Performed by: EMERGENCY MEDICINE

## 2019-11-28 PROCEDURE — 25000128 H RX IP 250 OP 636: Performed by: STUDENT IN AN ORGANIZED HEALTH CARE EDUCATION/TRAINING PROGRAM

## 2019-11-28 RX ORDER — MORPHINE SULFATE 4 MG/ML
4 INJECTION, SOLUTION INTRAMUSCULAR; INTRAVENOUS
Status: COMPLETED | OUTPATIENT
Start: 2019-11-28 | End: 2019-11-29

## 2019-11-28 RX ORDER — DIPHENHYDRAMINE HYDROCHLORIDE 50 MG/ML
25 INJECTION INTRAMUSCULAR; INTRAVENOUS ONCE
Status: COMPLETED | OUTPATIENT
Start: 2019-11-28 | End: 2019-11-28

## 2019-11-28 RX ORDER — ONDANSETRON 2 MG/ML
4 INJECTION INTRAMUSCULAR; INTRAVENOUS EVERY 30 MIN PRN
Status: DISCONTINUED | OUTPATIENT
Start: 2019-11-28 | End: 2019-11-30

## 2019-11-28 RX ORDER — IOPAMIDOL 755 MG/ML
73 INJECTION, SOLUTION INTRAVASCULAR ONCE
Status: COMPLETED | OUTPATIENT
Start: 2019-11-28 | End: 2019-11-28

## 2019-11-28 RX ORDER — DIPHENHYDRAMINE HCL 25 MG
25 CAPSULE ORAL ONCE
Status: COMPLETED | OUTPATIENT
Start: 2019-11-28 | End: 2019-11-28

## 2019-11-28 RX ORDER — SODIUM CHLORIDE 9 MG/ML
1000 INJECTION, SOLUTION INTRAVENOUS CONTINUOUS
Status: DISCONTINUED | OUTPATIENT
Start: 2019-11-28 | End: 2019-11-30

## 2019-11-28 RX ADMIN — MORPHINE SULFATE 4 MG: 4 INJECTION INTRAVENOUS at 21:48

## 2019-11-28 RX ADMIN — ONDANSETRON 4 MG: 2 INJECTION INTRAMUSCULAR; INTRAVENOUS at 19:54

## 2019-11-28 RX ADMIN — SODIUM CHLORIDE 1000 ML: 9 INJECTION, SOLUTION INTRAVENOUS at 19:54

## 2019-11-28 RX ADMIN — SODIUM CHLORIDE 1000 ML: 9 INJECTION, SOLUTION INTRAVENOUS at 21:48

## 2019-11-28 RX ADMIN — IOPAMIDOL 73 ML: 755 INJECTION, SOLUTION INTRAVENOUS at 21:02

## 2019-11-28 RX ADMIN — DIPHENHYDRAMINE HYDROCHLORIDE 25 MG: 50 INJECTION, SOLUTION INTRAMUSCULAR; INTRAVENOUS at 20:53

## 2019-11-28 RX ADMIN — MORPHINE SULFATE 4 MG: 4 INJECTION INTRAVENOUS at 19:55

## 2019-11-28 RX ADMIN — DIPHENHYDRAMINE HYDROCHLORIDE 25 MG: 25 CAPSULE ORAL at 23:02

## 2019-11-28 ASSESSMENT — ENCOUNTER SYMPTOMS
NECK PAIN: 1
FEVER: 0
SHORTNESS OF BREATH: 0
COUGH: 1
BACK PAIN: 1
RHINORRHEA: 0

## 2019-11-28 ASSESSMENT — MIFFLIN-ST. JEOR: SCORE: 1794.56

## 2019-11-29 ENCOUNTER — APPOINTMENT (OUTPATIENT)
Dept: ULTRASOUND IMAGING | Facility: CLINIC | Age: 29
DRG: 176 | End: 2019-11-29
Attending: STUDENT IN AN ORGANIZED HEALTH CARE EDUCATION/TRAINING PROGRAM
Payer: COMMERCIAL

## 2019-11-29 PROBLEM — R63.0 POOR APPETITE: Status: ACTIVE | Noted: 2019-11-29

## 2019-11-29 LAB
ANION GAP SERPL CALCULATED.3IONS-SCNC: 6 MMOL/L (ref 3–14)
BUN SERPL-MCNC: 8 MG/DL (ref 7–30)
CALCIUM SERPL-MCNC: 8.3 MG/DL (ref 8.5–10.1)
CHLORIDE SERPL-SCNC: 115 MMOL/L (ref 94–109)
CO2 SERPL-SCNC: 24 MMOL/L (ref 20–32)
CREAT SERPL-MCNC: 0.6 MG/DL (ref 0.52–1.04)
ERYTHROCYTE [DISTWIDTH] IN BLOOD BY AUTOMATED COUNT: 15.4 % (ref 10–15)
GFR SERPL CREATININE-BSD FRML MDRD: >90 ML/MIN/{1.73_M2}
GLUCOSE SERPL-MCNC: 96 MG/DL (ref 70–99)
HCT VFR BLD AUTO: 30.8 % (ref 35–47)
HGB BLD-MCNC: 9.5 G/DL (ref 11.7–15.7)
MCH RBC QN AUTO: 28.7 PG (ref 26.5–33)
MCHC RBC AUTO-ENTMCNC: 30.8 G/DL (ref 31.5–36.5)
MCV RBC AUTO: 93 FL (ref 78–100)
PLATELET # BLD AUTO: 256 10E9/L (ref 150–450)
POTASSIUM SERPL-SCNC: 3.4 MMOL/L (ref 3.4–5.3)
RBC # BLD AUTO: 3.31 10E12/L (ref 3.8–5.2)
SODIUM SERPL-SCNC: 145 MMOL/L (ref 133–144)
WBC # BLD AUTO: 7.1 10E9/L (ref 4–11)

## 2019-11-29 PROCEDURE — 87086 URINE CULTURE/COLONY COUNT: CPT | Performed by: EMERGENCY MEDICINE

## 2019-11-29 PROCEDURE — 96372 THER/PROPH/DIAG INJ SC/IM: CPT | Performed by: EMERGENCY MEDICINE

## 2019-11-29 PROCEDURE — 99223 1ST HOSP IP/OBS HIGH 75: CPT | Mod: AI | Performed by: STUDENT IN AN ORGANIZED HEALTH CARE EDUCATION/TRAINING PROGRAM

## 2019-11-29 PROCEDURE — 96375 TX/PRO/DX INJ NEW DRUG ADDON: CPT | Performed by: EMERGENCY MEDICINE

## 2019-11-29 PROCEDURE — 25000132 ZZH RX MED GY IP 250 OP 250 PS 637: Performed by: STUDENT IN AN ORGANIZED HEALTH CARE EDUCATION/TRAINING PROGRAM

## 2019-11-29 PROCEDURE — 93970 EXTREMITY STUDY: CPT

## 2019-11-29 PROCEDURE — 96361 HYDRATE IV INFUSION ADD-ON: CPT

## 2019-11-29 PROCEDURE — 25000128 H RX IP 250 OP 636: Performed by: EMERGENCY MEDICINE

## 2019-11-29 PROCEDURE — 96372 THER/PROPH/DIAG INJ SC/IM: CPT

## 2019-11-29 PROCEDURE — 25800030 ZZH RX IP 258 OP 636: Performed by: EMERGENCY MEDICINE

## 2019-11-29 PROCEDURE — 85027 COMPLETE CBC AUTOMATED: CPT | Performed by: STUDENT IN AN ORGANIZED HEALTH CARE EDUCATION/TRAINING PROGRAM

## 2019-11-29 PROCEDURE — G0378 HOSPITAL OBSERVATION PER HR: HCPCS

## 2019-11-29 PROCEDURE — 96376 TX/PRO/DX INJ SAME DRUG ADON: CPT | Performed by: EMERGENCY MEDICINE

## 2019-11-29 PROCEDURE — 25000128 H RX IP 250 OP 636: Performed by: STUDENT IN AN ORGANIZED HEALTH CARE EDUCATION/TRAINING PROGRAM

## 2019-11-29 PROCEDURE — 80048 BASIC METABOLIC PNL TOTAL CA: CPT | Performed by: STUDENT IN AN ORGANIZED HEALTH CARE EDUCATION/TRAINING PROGRAM

## 2019-11-29 RX ORDER — LIDOCAINE HYDROCHLORIDE 20 MG/ML
15 SOLUTION OROPHARYNGEAL EVERY 6 HOURS PRN
Status: DISCONTINUED | OUTPATIENT
Start: 2019-11-29 | End: 2019-12-05 | Stop reason: HOSPADM

## 2019-11-29 RX ORDER — MORPHINE SULFATE 2 MG/ML
2-4 INJECTION, SOLUTION INTRAMUSCULAR; INTRAVENOUS EVERY 4 HOURS PRN
Status: COMPLETED | OUTPATIENT
Start: 2019-11-29 | End: 2019-11-29

## 2019-11-29 RX ORDER — METFORMIN HCL 500 MG
500 TABLET, EXTENDED RELEASE 24 HR ORAL
Status: DISCONTINUED | OUTPATIENT
Start: 2019-11-29 | End: 2019-12-05 | Stop reason: HOSPADM

## 2019-11-29 RX ORDER — SUCRALFATE ORAL 1 G/10ML
1 SUSPENSION ORAL 4 TIMES DAILY
Status: DISCONTINUED | OUTPATIENT
Start: 2019-11-29 | End: 2019-12-05 | Stop reason: HOSPADM

## 2019-11-29 RX ORDER — DIPHENHYDRAMINE HCL 25 MG
25 CAPSULE ORAL EVERY 6 HOURS PRN
Status: DISCONTINUED | OUTPATIENT
Start: 2019-11-29 | End: 2019-12-05 | Stop reason: HOSPADM

## 2019-11-29 RX ORDER — OXYCODONE HYDROCHLORIDE 5 MG/1
5 TABLET ORAL EVERY 4 HOURS PRN
Status: DISCONTINUED | OUTPATIENT
Start: 2019-11-29 | End: 2019-12-02

## 2019-11-29 RX ORDER — OXYCODONE HYDROCHLORIDE 5 MG/1
5 TABLET ORAL EVERY 4 HOURS PRN
COMMUNITY
End: 2020-01-18

## 2019-11-29 RX ORDER — VITAMIN B COMPLEX
2000 TABLET ORAL DAILY
Status: DISCONTINUED | OUTPATIENT
Start: 2019-11-29 | End: 2019-12-05 | Stop reason: HOSPADM

## 2019-11-29 RX ORDER — SIMETHICONE 80 MG
80 TABLET,CHEWABLE ORAL EVERY 6 HOURS PRN
Status: DISCONTINUED | OUTPATIENT
Start: 2019-11-29 | End: 2019-12-05 | Stop reason: HOSPADM

## 2019-11-29 RX ORDER — ONDANSETRON 4 MG/1
4 TABLET, ORALLY DISINTEGRATING ORAL EVERY 6 HOURS PRN
Status: DISCONTINUED | OUTPATIENT
Start: 2019-11-29 | End: 2019-12-05 | Stop reason: HOSPADM

## 2019-11-29 RX ORDER — DESVENLAFAXINE 50 MG/1
100 TABLET, FILM COATED, EXTENDED RELEASE ORAL DAILY
Status: DISCONTINUED | OUTPATIENT
Start: 2019-11-29 | End: 2019-12-05 | Stop reason: HOSPADM

## 2019-11-29 RX ORDER — ONDANSETRON 2 MG/ML
4 INJECTION INTRAMUSCULAR; INTRAVENOUS EVERY 6 HOURS PRN
Status: DISCONTINUED | OUTPATIENT
Start: 2019-11-29 | End: 2019-12-05 | Stop reason: HOSPADM

## 2019-11-29 RX ORDER — BUSPIRONE HYDROCHLORIDE 10 MG/1
10 TABLET ORAL 2 TIMES DAILY
Status: DISCONTINUED | OUTPATIENT
Start: 2019-11-29 | End: 2019-12-05 | Stop reason: HOSPADM

## 2019-11-29 RX ORDER — CALCIUM CARBONATE 500 MG/1
500 TABLET, CHEWABLE ORAL
Status: DISCONTINUED | OUTPATIENT
Start: 2019-11-29 | End: 2019-12-05 | Stop reason: HOSPADM

## 2019-11-29 RX ORDER — ALUMINA, MAGNESIA, AND SIMETHICONE 2400; 2400; 240 MG/30ML; MG/30ML; MG/30ML
15 SUSPENSION ORAL EVERY 6 HOURS PRN
Status: DISCONTINUED | OUTPATIENT
Start: 2019-11-29 | End: 2019-12-05 | Stop reason: HOSPADM

## 2019-11-29 RX ORDER — ALBUTEROL SULFATE 90 UG/1
2 AEROSOL, METERED RESPIRATORY (INHALATION) 4 TIMES DAILY PRN
Status: DISCONTINUED | OUTPATIENT
Start: 2019-11-29 | End: 2019-12-05 | Stop reason: HOSPADM

## 2019-11-29 RX ORDER — GABAPENTIN 600 MG/1
600 TABLET ORAL 3 TIMES DAILY
Status: DISCONTINUED | OUTPATIENT
Start: 2019-11-29 | End: 2019-12-05 | Stop reason: HOSPADM

## 2019-11-29 RX ORDER — TOPIRAMATE 50 MG/1
100 TABLET, FILM COATED ORAL AT BEDTIME
Status: DISCONTINUED | OUTPATIENT
Start: 2019-11-29 | End: 2019-12-05 | Stop reason: HOSPADM

## 2019-11-29 RX ORDER — NALOXONE HYDROCHLORIDE 0.4 MG/ML
.1-.4 INJECTION, SOLUTION INTRAMUSCULAR; INTRAVENOUS; SUBCUTANEOUS
Status: DISCONTINUED | OUTPATIENT
Start: 2019-11-29 | End: 2019-12-05 | Stop reason: HOSPADM

## 2019-11-29 RX ORDER — CLONIDINE HYDROCHLORIDE 0.1 MG/1
0.1 TABLET ORAL 2 TIMES DAILY
Status: DISCONTINUED | OUTPATIENT
Start: 2019-11-29 | End: 2019-12-05 | Stop reason: HOSPADM

## 2019-11-29 RX ADMIN — SODIUM CHLORIDE 1000 ML: 9 INJECTION, SOLUTION INTRAVENOUS at 19:34

## 2019-11-29 RX ADMIN — PROCHLORPERAZINE EDISYLATE 5 MG: 5 INJECTION INTRAMUSCULAR; INTRAVENOUS at 03:05

## 2019-11-29 RX ADMIN — CLONIDINE HYDROCHLORIDE 0.1 MG: 0.1 TABLET ORAL at 08:18

## 2019-11-29 RX ADMIN — GABAPENTIN 600 MG: 600 TABLET, FILM COATED ORAL at 14:09

## 2019-11-29 RX ADMIN — OXYCODONE HYDROCHLORIDE 5 MG: 5 TABLET ORAL at 16:57

## 2019-11-29 RX ADMIN — TOPIRAMATE 100 MG: 50 TABLET ORAL at 20:04

## 2019-11-29 RX ADMIN — ONDANSETRON 4 MG: 2 INJECTION INTRAMUSCULAR; INTRAVENOUS at 00:40

## 2019-11-29 RX ADMIN — SUCRALFATE 1 G: 1 SUSPENSION ORAL at 08:19

## 2019-11-29 RX ADMIN — MORPHINE SULFATE 4 MG: 2 INJECTION, SOLUTION INTRAMUSCULAR; INTRAVENOUS at 09:11

## 2019-11-29 RX ADMIN — CLONIDINE HYDROCHLORIDE 0.1 MG: 0.1 TABLET ORAL at 20:06

## 2019-11-29 RX ADMIN — GABAPENTIN 600 MG: 600 TABLET, FILM COATED ORAL at 08:18

## 2019-11-29 RX ADMIN — ACETAMINOPHEN 1000 MG: 325 SOLUTION ORAL at 03:04

## 2019-11-29 RX ADMIN — Medication: at 14:08

## 2019-11-29 RX ADMIN — CALCIUM CARBONATE (ANTACID) CHEW TAB 500 MG 500 MG: 500 CHEW TAB at 08:18

## 2019-11-29 RX ADMIN — METFORMIN HYDROCHLORIDE 500 MG: 500 TABLET, EXTENDED RELEASE ORAL at 17:04

## 2019-11-29 RX ADMIN — ACETAMINOPHEN 1000 MG: 325 SOLUTION ORAL at 14:17

## 2019-11-29 RX ADMIN — MORPHINE SULFATE 4 MG: 4 INJECTION INTRAVENOUS at 00:40

## 2019-11-29 RX ADMIN — MELATONIN 2000 UNITS: at 08:18

## 2019-11-29 RX ADMIN — GABAPENTIN 600 MG: 600 TABLET, FILM COATED ORAL at 20:06

## 2019-11-29 RX ADMIN — CALCIUM CARBONATE (ANTACID) CHEW TAB 500 MG 500 MG: 500 CHEW TAB at 17:04

## 2019-11-29 RX ADMIN — LURASIDONE HYDROCHLORIDE 60 MG: 40 TABLET, FILM COATED ORAL at 20:03

## 2019-11-29 RX ADMIN — ENOXAPARIN SODIUM 120 MG: 120 INJECTION SUBCUTANEOUS at 00:27

## 2019-11-29 RX ADMIN — SUCRALFATE 1 G: 1 SUSPENSION ORAL at 14:08

## 2019-11-29 RX ADMIN — ENOXAPARIN SODIUM 120 MG: 120 INJECTION SUBCUTANEOUS at 09:30

## 2019-11-29 RX ADMIN — SUCRALFATE 1 G: 1 SUSPENSION ORAL at 20:06

## 2019-11-29 RX ADMIN — BUSPIRONE HYDROCHLORIDE 10 MG: 10 TABLET ORAL at 20:02

## 2019-11-29 RX ADMIN — CALCIUM CARBONATE (ANTACID) CHEW TAB 500 MG 500 MG: 500 CHEW TAB at 14:09

## 2019-11-29 RX ADMIN — ENOXAPARIN SODIUM 120 MG: 120 INJECTION SUBCUTANEOUS at 20:06

## 2019-11-29 RX ADMIN — ONDANSETRON 4 MG: 4 TABLET, ORALLY DISINTEGRATING ORAL at 07:38

## 2019-11-29 RX ADMIN — MORPHINE SULFATE 2 MG: 2 INJECTION, SOLUTION INTRAMUSCULAR; INTRAVENOUS at 05:20

## 2019-11-29 RX ADMIN — BUSPIRONE HYDROCHLORIDE 10 MG: 10 TABLET ORAL at 08:18

## 2019-11-29 RX ADMIN — DESVENLAFAXINE 100 MG: 50 TABLET, FILM COATED, EXTENDED RELEASE ORAL at 08:18

## 2019-11-29 RX ADMIN — OMEPRAZOLE 20 MG: KIT at 08:17

## 2019-11-29 RX ADMIN — SODIUM CHLORIDE 1000 ML: 9 INJECTION, SOLUTION INTRAVENOUS at 03:05

## 2019-11-29 RX ADMIN — ONDANSETRON 4 MG: 4 TABLET, ORALLY DISINTEGRATING ORAL at 20:15

## 2019-11-29 ASSESSMENT — MIFFLIN-ST. JEOR: SCORE: 1811.8

## 2019-11-29 NOTE — ED TRIAGE NOTES
Patient presents via EMS for c/o hemoptysis. Patient reports two episodes of coughing up blood today. Patient denies ingesting anything. Patient reports throat and back pain.

## 2019-11-29 NOTE — PROVIDER NOTIFICATION
Patient c/o throat and upper back pain. PRN Tylenol didn't help per pt. This concern notified [facility]. Susan Frost MD called back with a new order for Oxycodone 5 mg q 4 prn.

## 2019-11-29 NOTE — ED PROVIDER NOTES
History     Chief Complaint   Patient presents with     Hemoptysis     HPI  Nevin Alvarado is a 29 year old female with a history of borderline personality disorder, depression, PTSD, anxiety, multiple intentional and unintentional overdoses, multiple foreign body ingestions/insertion, with recent esophageal perforation now status post esophageal stent (10/9/2019), with ED care plan in place; who presents to the Emergency Department by ambulance for evaluation of hemoptysis.     Per Care Everywhere, patient was admitted to [facility] on 11/26/19 to 11/27/19 after she ingested a pen spring in the setting of acute stress due to extended leave of absence of her [facility] worker. She denied having any intent to harm herself. GI were consulted and performed an EGD to remove the spring. She was scheduled to follow-up with [facility] for the esophageal stent. Patient was informed about alternative coping strategies with stress to avoid future events of foreign body ingestion.     Here, patient reports that she developed some throat irritation after eating dinner tonight, followed by two episodes of hemoptysis in succession wherein she reportedly coughed up approximately a teaspoon of mostly blood with some mucus during each episode. She denies any blood clots with these. Shortly thereafter, she developed new throbbing anterior neck pain as well as recurrent upper back pain-developed after her recent stenting and resolved sometime prior. She denies any foreign body ingestion tonight. She has never had symptoms similar to this. She is concerned that symptoms are related to her esophageal stent, which she is scheduled to remove in a few days. Patient notes that she was placed on a full liquid and soft food diet, which she has adhered to. She denies any recent fevers, productive cough, rhinorrhea, chest pain, shortness of breath, lower extremity pain or swelling, or other complaints. She denies  a history of DVT or PE.     I have reviewed the Medications, Allergies, Past Medical and Surgical History, and Social History in the Epic system.    Past Medical History:   Diagnosis Date     ADD (attention deficit disorder)      Anorexia nervosa with bulimia     history of; on Topamax     Anxiety      Borderline personality disorder (H)      Depression      Depressive disorder      H/O self-harm      Lives in independent group home     due to debilitating mental illness     Migraine without aura     no known triggers; on Topamax bid and Imitrex PRN     Morbid obesity (H)      PTSD (post-traumatic stress disorder)      Rectal foreign body - Recurrent issue, self placed      Swallowed foreign body - Recurrent issue, self placed        Past Surgical History:   Procedure Laterality Date     COMBINED ESOPHAGOSCOPY, GASTROSCOPY, DUODENOSCOPY (EGD), REPLACE ESOPHAGEAL STENT N/A 10/9/2019    Procedure: Upper Endoscopy with Suture Placement;  Surgeon: Zurdo Ramirez MD;  Location: [facility]     ESOPHAGOSCOPY, GASTROSCOPY, DUODENOSCOPY (EGD), COMBINED N/A 3/9/2017    Procedure: COMBINED ESOPHAGOSCOPY, GASTROSCOPY, DUODENOSCOPY (EGD), REMOVE FOREIGN BODY;  Surgeon: Avis Guzmán MD;  Location: [facility]     ESOPHAGOSCOPY, GASTROSCOPY, DUODENOSCOPY (EGD), COMBINED N/A 4/20/2017    Procedure: COMBINED ESOPHAGOSCOPY, GASTROSCOPY, DUODENOSCOPY (EGD), REMOVE FOREIGN BODY;  EGD removal Foregin body;  Surgeon: Lokesh Paula MD;  Location: [facility]     ESOPHAGOSCOPY, GASTROSCOPY, DUODENOSCOPY (EGD), COMBINED N/A 6/12/2017    Procedure: COMBINED ESOPHAGOSCOPY, GASTROSCOPY, DUODENOSCOPY (EGD);  COMBINED ESOPHAGOSCOPY, GASTROSCOPY, DUODENOSCOPY (EGD) [[id number]]attempted removal of foreign body;  Surgeon: Pamela Perez MD;  Location: [facility]     ESOPHAGOSCOPY, GASTROSCOPY, DUODENOSCOPY (EGD), COMBINED N/A 6/9/2017    Procedure: COMBINED ESOPHAGOSCOPY, GASTROSCOPY, DUODENOSCOPY (EGD), REMOVE FOREIGN BODY;   Esophagoscopy, Gastroscopy, Duodenoscopy, Removal of Foreign Body;  Surgeon: Dejon Marsh MD;  Location: [facility]     ESOPHAGOSCOPY, GASTROSCOPY, DUODENOSCOPY (EGD), COMBINED N/A 1/6/2018    Procedure: COMBINED ESOPHAGOSCOPY, GASTROSCOPY, DUODENOSCOPY (EGD), REMOVE FOREIGN BODY;  COMBINED ESOPHAGOSCOPY, GASTROSCOPY, DUODENOSCOPY (EGD) [by pascal net and snare with profol sedation;  Surgeon: Feliciano Emmanuel MD;  Location:  GI     ESOPHAGOSCOPY, GASTROSCOPY, DUODENOSCOPY (EGD), COMBINED N/A 3/19/2018    Procedure: COMBINED ESOPHAGOSCOPY, GASTROSCOPY, DUODENOSCOPY (EGD), REMOVE FOREIGN BODY;   Esophagodscopy, Gastroscopy, Duodenoscopy,Foreign Body Removal;  Surgeon: Brice Guzmán MD;  [facility]     ESOPHAGOSCOPY, GASTROSCOPY, DUODENOSCOPY (EGD), COMBINED N/A 4/16/2018    Procedure: COMBINED ESOPHAGOSCOPY, GASTROSCOPY, DUODENOSCOPY (EGD), REMOVE FOREIGN BODY;  Esophagogastroduodenoscopy  Foreign Body Removal X 2;  Surgeon: Royer Moise MD;  [facility]: [facility]     ESOPHAGOSCOPY, GASTROSCOPY, DUODENOSCOPY (EGD), COMBINED N/A 6/1/2018    Procedure: COMBINED ESOPHAGOSCOPY, GASTROSCOPY, DUODENOSCOPY (EGD), REMOVE FOREIGN BODY;  COMBINED ESOPHAGOSCOPY, GASTROSCOPY, DUODENOSCOPY with removal of foreign body, propofol sedation by anesthesia;  Surgeon: Brice Martinez MD;  Location:  GI     ESOPHAGOSCOPY, GASTROSCOPY, DUODENOSCOPY (EGD), COMBINED N/A 7/25/2018    Procedure: COMBINED ESOPHAGOSCOPY, GASTROSCOPY, DUODENOSCOPY (EGD), REMOVE FOREIGN BODY;;  Surgeon: Candy Castelan MD;  Location:  GI     ESOPHAGOSCOPY, GASTROSCOPY, DUODENOSCOPY (EGD), COMBINED N/A 7/28/2018    Procedure: COMBINED ESOPHAGOSCOPY, GASTROSCOPY, DUODENOSCOPY (EGD), REMOVE FOREIGN BODY;  COMBINED ESOPHAGOSCOPY, GASTROSCOPY, DUODENOSCOPY (EGD), REMOVE FOREIGN BODY;  Surgeon: Brice Guzmán MD;  [facility]     ESOPHAGOSCOPY, GASTROSCOPY, DUODENOSCOPY (EGD), COMBINED N/A 7/31/2018    Procedure:  COMBINED ESOPHAGOSCOPY, GASTROSCOPY, DUODENOSCOPY (EGD);  COMBINED ESOPHAGOSCOPY, GASTROSCOPY, DUODENOSCOPY (EGD) TO REMOVE FOREIGN BODY;  Surgeon: Lokesh Paula MD;  [facility]     ESOPHAGOSCOPY, GASTROSCOPY, DUODENOSCOPY (EGD), COMBINED N/A 8/4/2018    Procedure: COMBINED ESOPHAGOSCOPY, GASTROSCOPY, DUODENOSCOPY (EGD), REMOVE FOREIGN BODY;   combined esophagoscopy, gastroscopy, duodenoscopy, REMOVE FOREIGN BODY ;  Surgeon: Lokesh Paula MD;  [facility]: [facility]     ESOPHAGOSCOPY, GASTROSCOPY, DUODENOSCOPY (EGD), COMBINED N/A 10/6/2019    Procedure: ESOPHAGOGASTRODUODENOSCOPY (EGD) with fireign body removal x2, esophageal stent placement with floroscopy;  Surgeon: Timoteo Espana MD;  [facility]     EXAM UNDER ANESTHESIA ANUS N/A 1/10/2017    Procedure: EXAM UNDER ANESTHESIA ANUS;  Surgeon: Annmarie Haynes MD;  [facility]     EXAM UNDER ANESTHESIA RECTUM N/A 7/19/2018    Procedure: EXAM UNDER ANESTHESIA RECTUM;  EXAM UNDER ANESTHESIA, REMOVAL OF RECTAL FOREIGN BODY;  Surgeon: Annmarie Haynes MD;  Location: [facility]     HC REMOVE FECAL IMPACTION OR FB W ANESTHESIA N/A 12/18/2016    Procedure: REMOVE FECAL IMPACTION/FOREIGN BODY UNDER ANESTHESIA;  Surgeon: Soham Cano MD;  Location: [facility]     KNEE SURGERY - removed a small tissue mass from knee Right      LAPAROSCOPIC ABLATION ENDOMETRIOSIS       LAPAROTOMY EXPLORATORY N/A 1/10/2017    Procedure: LAPAROTOMY EXPLORATORY;  Surgeon: Annmarie Haynes MD;  Location: [facility]     LAPAROTOMY EXPLORATORY  09/11/2019    Manual manipulation of bowel to remove pill bottle in rectum     lymph nodes removed from neck; benign  age 7     MAMMOPLASTY REDUCTION Bilateral      RELEASE CARPAL TUNNEL Bilateral      SIGMOIDOSCOPY FLEXIBLE N/A 1/10/2017    Procedure: SIGMOIDOSCOPY FLEXIBLE;  Surgeon: Annmarie Haynes MD;  Location: [facility]     SIGMOIDOSCOPY FLEXIBLE N/A 5/8/2018    Procedure: SIGMOIDOSCOPY FLEXIBLE;  flex  sig with foreign body removal using snare and rattooth forcep;  Surgeon: Soham Cano MD;  Location:  GI     SIGMOIDOSCOPY FLEXIBLE N/A 2/20/2019    Procedure: Exam under anesthesia Colonoscopy with attempted  removal of rectal foreign body;  Surgeon: Estrada Chávez MD;  Location: [facility]       Family History   Problem Relation Age of Onset     Diabetes Type 2  Maternal Grandmother      Diabetes Type 2  Paternal Grandmother      Breast Cancer Paternal Grandmother      Cerebrovascular Disease Father 53     Myocardial Infarction No family hx of      Coronary Artery Disease Early Onset No family hx of      Ovarian Cancer No family hx of      Colon Cancer No family hx of        Social History     Tobacco Use     Smoking status: Never Smoker     Smokeless tobacco: Never Used   Substance Use Topics     Alcohol use: No     Alcohol/week: 0.0 standard drinks     Current Facility-Administered Medications   Medication     enoxaparin ANTICOAGULANT (LOVENOX) injection 120 mg     ondansetron (ZOFRAN) injection 4 mg     sodium chloride 0.9% infusion     Current Outpatient Medications   Medication     acetaminophen (TYLENOL) 32 mg/mL liquid     albuterol (PROAIR HFA) 108 (90 Base) MCG/ACT inhaler     alum & mag hydroxide-simethicone (MYLANTA/MAALOX) 200-200-20 MG/5ML SUSP suspension     busPIRone (BUSPAR) 10 MG tablet     calcium carbonate (TUMS) 500 MG chewable tablet     cloNIDine (CATAPRES) 0.1 MG tablet     desvenlafaxine (PRISTIQ) 100 MG 24 hr tablet     diphenhydrAMINE (BENADRYL) 25 MG capsule     docosanol (ABREVA) 10 % CREA cream     fluticasone-salmeterol (ADVAIR) 100-50 MCG/DOSE inhaler     levonorgestrel (MIRENA) 20 MCG/24HR IUD     lidocaine (XYLOCAINE) 2 % solution     lurasidone (LATUDA) 60 MG TABS tablet     menthol (ICY HOT) 5 % PTCH     metFORMIN (GLUCOPHAGE-XR) 500 MG 24 hr tablet     omeprazole (PRILOSEC) 2 mg/mL suspension     ondansetron (ZOFRAN-ODT) 8 MG ODT tab     potassium chloride (KLOR-CON) 20 MEQ  packet     simethicone (MYLICON) 80 MG chewable tablet     sucralfate (CARAFATE) 1 GM/10ML suspension     topiramate (TOPAMAX) 100 MG tablet     vitamin D3 (CHOLECALCIFEROL) 2000 units (50 mcg) tablet        Allergies   Allergen Reactions     Amoxicillin-Pot Clavulanate Other (See Comments), Rash and Swelling     PN: facial swelling, left side. Also had cortisone injection the same day as she started the Augmentin.  PN: facial swelling, left side. Also had cortisone injection the same day as she started the Augmentin.  Noted in downtime recovery of chart.       Penicillins Anaphylaxis     Vancomycin Swelling, Itching and Rash     Other reaction(s): Redness  Flushed face, very itchy; HUT Comment: Flushed face, very itchy; HUT Reaction: Angioedema; HUT Reaction: Redness; HUT Severity: Med; HUT Noted: [id number]  Flushed face, very itchy  Flushed face, very itchy  Flushed face, very itchy       Hydrocodone Nausea and Vomiting and GI Disturbance     vomiting for days  vomiting for days  vomiting for days  vomiting for days, PN: vomiting for days  vomiting for days  vomiting for days  vomiting for days  vomiting for days  vomiting for days  vomiting for days, PN: vomiting for days  vomiting for days  vomiting for days  vomiting for days  vomiting for days  ; HUT Comment: vomiting for days; HUT Reaction: Gastrointestinal; HUT Reaction: Nausea And Vomiting; HUT Reaction Type: Intolerance; HUT Severity: Med; HUT Noted: [id number]  vomiting for days       Blood-Group Specific Substance Other (See Comments)     Patient has an anti-Cw and non-specific antibodies. Blood product orders may be delayed. Draw one red top and two purple top tubes for all type/screen/crossmatch orders.     Influenza Vaccines Other (See Comments)     Oseltamivir Hives     med stopped, PN: med stopped     Cephalosporins Rash     Lamotrigine Rash     Possibly associated with Lamictal.   HUT Comment: Possibly associated with Lamictal. ; HUT Reaction: Rash;  "HUT Reaction Type: Allergy; HUT Severity: Low; HUT Noted: [id number]     Latex Rash       Review of Systems   Constitutional: Negative for fever.   HENT: Negative for rhinorrhea.    Respiratory: Positive for cough (hemoptysis x 2). Negative for shortness of breath.    Cardiovascular: Negative for chest pain and leg swelling.   Musculoskeletal: Positive for back pain (upper back pain) and neck pain (anterior neck pain).   All other systems reviewed and are negative.      Physical Exam   BP: (!) 143/75  Pulse: 84  Temp: 98.6  F (37  C)  Resp: 16  Height: 157.5 cm (5' 2\")  Weight: 111.1 kg (245 lb)  SpO2: 100 %      Physical Exam   General: awake, alert, NAD  Head: normal cephalic  HEENT: pupils equal, conjugate gaze in tact, moist mucous membranes posterior oropharynx are normal  Neck: Supple, no crepitus   CV: regular rate and rhythm without murmur  Lungs: clear to ascultation  Abd: soft, non-tender, no guarding, no peritoneal signs  EXT: lower extremities without swelling or edema  Neuro: awake, answers questions appropriately. No focal deficits noted      ED Course        Procedures             Labs Ordered and Resulted from Time of ED Arrival Up to the Time of Departure from the ED   CBC WITH PLATELETS DIFFERENTIAL - Abnormal; Notable for the following components:       Result Value    Hemoglobin 11.4 (*)     RDW 15.2 (*)     All other components within normal limits   COMPREHENSIVE METABOLIC PANEL - Abnormal; Notable for the following components:    Glucose 103 (*)     All other components within normal limits   ROUTINE UA WITH MICROSCOPIC - Abnormal; Notable for the following components:    Squamous Epithelial /HPF Urine 2 (*)     All other components within normal limits   LACTIC ACID WHOLE BLOOD   INR   HCG QUALITATIVE   PERIPHERAL IV CATHETER   URINE CULTURE AEROBIC BACTERIAL            Assessments & Plan (with Medical Decision Making)   Nevin is a 29-year-old female who presents with new hemoptysis, neck " pain, and upper back pain in the setting of esophageal stent and EGD performed yesterday.  Patient is well-appearing, stable vital signs.  Differential would include things such as esophageal perforation as a complication from her stent or recent EGD, stent migration, repeat foreign body, would also consider PE as she is within 6 weeks of a surgical procedure and has had frequent EGDs recently.    Her labs are reassuring.  She has a normal white count, no left shift.  Her electrolytes are unremarkable.  She has a normal lactic acid normal INR.    Given concern for both esophageal perforation and also PE I did discuss the case with radiology they recommended ordering a CT PE study and then they would protocol it to also look for esophageal perforation.  CT study showed a PE, no evidence of esophageal perforation but concern for debris within the esophageal stent.  Patient had several episodes of  nonbloody emesis in the emergency department.  She was given several doses of Zofran.  Consulted GI regarding the concern for debris noted in the stent, they did not have any specific recommendations.  They recommended consulting CT surgery regarding the new neck pain, CT surgery came and evaluated the patient did not have any further recommendations for work-up regarding the neck pain.    At this point patient is not tolerating p.o.  Will admit to the medicine service.  While in the emergency department patient also expressed concern over her mental health will proceed with the mental health assessment here in the ER.  Patient was on a one-to-one watch while in the emergency department due to her impulsive swallowing behavior.  A mental health assessment was performed through the DEC , patient denied any suicidal ideation they do not feel that she needed inpatient mental health treatment but made arrangements for outpatient follow-up.    Patient is not tolerating p.o.  She will be admitted to the medicine service  for observation, GI can see her in the morning if she still not tolerating p.o.  She was given a dose of therapeutic Lovenox here in the emergency department she will go to the floor in a one-to-one.    I have reviewed the nursing notes.    I have reviewed the findings, diagnosis, plan and need for follow up with the patient.    New Prescriptions    No medications on file       Final diagnoses:   Acute pulmonary embolism without acute cor pulmonale, unspecified pulmonary embolism type (H)     I, Radha Brunner, am serving as a trained medical scribe to document services personally performed by Marco Antonio Hinojosa MD, based on the provider's statements to me.   I, Marco Antonio Hinojosa MD, was physically present and have reviewed and verified the accuracy of this note documented by Radha Brunner.      11/28/2019   [facility], [place], EMERGENCY DEPARTMENT     Marco Antonio Hinojosa MD  11/29/19 0050

## 2019-11-29 NOTE — CONSULTS
GASTROENTEROLOGY CONSULTATION      Date of Admission:  11/28/2019           Reason for Consultation:   We were asked by Dr. Esteban of Internal Medicine to evaluate this patient for esophogeal stent placement.          ASSESSMENT AND RECOMMENDATIONS:   Assessment:  Nevin Alvarado is a 29 year old female with a history notable for borderline personality disorder, chronic pain, depression, anxiety, suicide attempts, and recurrent oral ingestion and rectal insertion of foreign bodies requiring multiple endoscopic and surgical interventions who presents with two episodes of hemoptysis, abdominal pain, nausea and emesis. GI was consulted for evaluation of esophogeal stent placement.       #. Recent esophogeal perforation s/p covered esophogeal stent placement   #. Multiple foreign body ingestions  Esophogeal perforation in the setting of ingestion of 2 straightened paper clips now s/p covered esophogeal stent placement on 10/7 and secured by two endoclips and two endoscopic sutures. Patient admits to swallowing multiple pen springs since that time with endoscopic removal of at least once this month, with last retrieval on 11/27/19. Esophogeal stent appears in place on imaging from yesterday when compared to prior imaging.     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   -- GI will sign off at this time    Thank you for involving us in this patient's care. Please do not hesitate to contact the GI service with any questions or concerns.     Pt care plan discussed with Dr. Jimenez, GI staff  physician.    Nathan Whitley, DO  Internal Medicine, PGY-3  Pager [phone]    -------------------------------------------------------------------------------------------------------------------    History of Present Illness   Nevin Alvarado is a 29 year old female with a history notable for borderline personality disorder, chronic pain, depression, anxiety, suicide attempts, and recurrent oral ingestion and rectal insertion of foreign bodies requiring multiple endoscopic and surgical interventions who presents with two episodes of hemoptysis, abdominal pain, nausea and emesis. GI was consulted for evaluation of esophogeal stent placement.      Patient has had multiple hospitalizations for foreign body ingestions as well as 2 ex-laps for surgical removal of rectally inserted foreign body. Patient was hospitalized on 9/11 for foreign body and underwent ex-lap and flex sig to remove foreign body. She was then hospitalized on 10/6-10/11 during which time two straightened paperclips were removed and a large esophogeal perforation was confirmed with fully covered stent placement. Since that time she was undergone several other EGDs for foreign bodies. She was noted to have metallic object near cecum on 11/10 and underwent laparoscopic appendectomy with retrieval of endoclip at that time.     She reports frequent food sticking since that time and has tried to adhere to the diet that was recommended to her. However, she did eat pizza several times last week and ate firmer foods over thanksgiving yesterday. Endorses odynophagia and reports intermittent regurgitation of her food. She reports ingesting 2 pen springs, one 2 weeks ago and another 2 days ago.     She reports having two episodes of hemoptysis yesterday with ~1 teaspoon of blood mixed with sputum, followed by nausea and several episodes of emesis. She reports intermittent left sided chest pain over the past several weeks, epigastric pain, nausea and emesis. She  otherwise denies any fevers, chills, shortness of breath, melena, hematochezia, constipation or diarrhea.     Past Medical History    Reviewed and edited as appropriate  Past Medical History:   Diagnosis Date     ADD (attention deficit disorder)      Anorexia nervosa with bulimia     history of; on Topamax     Anxiety      Borderline personality disorder (H)      Depression      Depressive disorder      H/O self-harm      Lives in independent group home     due to debilitating mental illness     Migraine without aura     no known triggers; on Topamax bid and Imitrex PRN     Morbid obesity (H)      PTSD (post-traumatic stress disorder)      Rectal foreign body - Recurrent issue, self placed      Swallowed foreign body - Recurrent issue, self placed        Past Surgical History   Reviewed and edited as appropriate   Past Surgical History:   Procedure Laterality Date     COMBINED ESOPHAGOSCOPY, GASTROSCOPY, DUODENOSCOPY (EGD), REPLACE ESOPHAGEAL STENT N/A 10/9/2019    Procedure: Upper Endoscopy with Suture Placement;  Surgeon: Zurdo Ramirez MD;  Location: [facility]     ESOPHAGOSCOPY, GASTROSCOPY, DUODENOSCOPY (EGD), COMBINED N/A 3/9/2017    Procedure: COMBINED ESOPHAGOSCOPY, GASTROSCOPY, DUODENOSCOPY (EGD), REMOVE FOREIGN BODY;  Surgeon: Avis Guzmán MD;  Location: [facility]     ESOPHAGOSCOPY, GASTROSCOPY, DUODENOSCOPY (EGD), COMBINED N/A 4/20/2017    Procedure: COMBINED ESOPHAGOSCOPY, GASTROSCOPY, DUODENOSCOPY (EGD), REMOVE FOREIGN BODY;  EGD removal Foregin body;  Surgeon: Lokesh Paula MD;  Location: [facility]     ESOPHAGOSCOPY, GASTROSCOPY, DUODENOSCOPY (EGD), COMBINED N/A 6/12/2017    Procedure: COMBINED ESOPHAGOSCOPY, GASTROSCOPY, DUODENOSCOPY (EGD);  COMBINED ESOPHAGOSCOPY, GASTROSCOPY, DUODENOSCOPY (EGD) [[id number]]attempted removal of foreign body;  Surgeon: Pamela Perez MD;  Location: [facility]     ESOPHAGOSCOPY, GASTROSCOPY, DUODENOSCOPY (EGD), COMBINED N/A 6/9/2017     Procedure: COMBINED ESOPHAGOSCOPY, GASTROSCOPY, DUODENOSCOPY (EGD), REMOVE FOREIGN BODY;  Esophagoscopy, Gastroscopy, Duodenoscopy, Removal of Foreign Body;  Surgeon: Dejon Marsh MD;  Location: [facility]     ESOPHAGOSCOPY, GASTROSCOPY, DUODENOSCOPY (EGD), COMBINED N/A 1/6/2018    Procedure: COMBINED ESOPHAGOSCOPY, GASTROSCOPY, DUODENOSCOPY (EGD), REMOVE FOREIGN BODY;  COMBINED ESOPHAGOSCOPY, GASTROSCOPY, DUODENOSCOPY (EGD) [by pascal net and snare with profol sedation;  Surgeon: Feliciano Emmanuel MD;  Location:  GI     ESOPHAGOSCOPY, GASTROSCOPY, DUODENOSCOPY (EGD), COMBINED N/A 3/19/2018    Procedure: COMBINED ESOPHAGOSCOPY, GASTROSCOPY, DUODENOSCOPY (EGD), REMOVE FOREIGN BODY;   Esophagodscopy, Gastroscopy, Duodenoscopy,Foreign Body Removal;  Surgeon: Brice Guzmán MD;  [facility]: [facility]     ESOPHAGOSCOPY, GASTROSCOPY, DUODENOSCOPY (EGD), COMBINED N/A 4/16/2018    Procedure: COMBINED ESOPHAGOSCOPY, GASTROSCOPY, DUODENOSCOPY (EGD), REMOVE FOREIGN BODY;  Esophagogastroduodenoscopy  Foreign Body Removal X 2;  Surgeon: Royer Moise MD;  [facility]: [facility]     ESOPHAGOSCOPY, GASTROSCOPY, DUODENOSCOPY (EGD), COMBINED N/A 6/1/2018    Procedure: COMBINED ESOPHAGOSCOPY, GASTROSCOPY, DUODENOSCOPY (EGD), REMOVE FOREIGN BODY;  COMBINED ESOPHAGOSCOPY, GASTROSCOPY, DUODENOSCOPY with removal of foreign body, propofol sedation by anesthesia;  Surgeon: Brice Martinez MD;  Location:  GI     ESOPHAGOSCOPY, GASTROSCOPY, DUODENOSCOPY (EGD), COMBINED N/A 7/25/2018    Procedure: COMBINED ESOPHAGOSCOPY, GASTROSCOPY, DUODENOSCOPY (EGD), REMOVE FOREIGN BODY;;  Surgeon: Candy Castelan MD;  Location:  GI     ESOPHAGOSCOPY, GASTROSCOPY, DUODENOSCOPY (EGD), COMBINED N/A 7/28/2018    Procedure: COMBINED ESOPHAGOSCOPY, GASTROSCOPY, DUODENOSCOPY (EGD), REMOVE FOREIGN BODY;  COMBINED ESOPHAGOSCOPY, GASTROSCOPY, DUODENOSCOPY (EGD), REMOVE FOREIGN BODY;  Surgeon: Brice Guzmán MD;  Location:  OR      ESOPHAGOSCOPY, GASTROSCOPY, DUODENOSCOPY (EGD), COMBINED N/A 7/31/2018    Procedure: COMBINED ESOPHAGOSCOPY, GASTROSCOPY, DUODENOSCOPY (EGD);  COMBINED ESOPHAGOSCOPY, GASTROSCOPY, DUODENOSCOPY (EGD) TO REMOVE FOREIGN BODY;  Surgeon: Lokesh Paula MD;  [facility]     ESOPHAGOSCOPY, GASTROSCOPY, DUODENOSCOPY (EGD), COMBINED N/A 8/4/2018    Procedure: COMBINED ESOPHAGOSCOPY, GASTROSCOPY, DUODENOSCOPY (EGD), REMOVE FOREIGN BODY;   combined esophagoscopy, gastroscopy, duodenoscopy, REMOVE FOREIGN BODY ;  Surgeon: Lokesh Paula MD;  [facility]: [facility]     ESOPHAGOSCOPY, GASTROSCOPY, DUODENOSCOPY (EGD), COMBINED N/A 10/6/2019    Procedure: ESOPHAGOGASTRODUODENOSCOPY (EGD) with fireign body removal x2, esophageal stent placement with floroscopy;  Surgeon: Timoteo Espana MD;  [facility]     EXAM UNDER ANESTHESIA ANUS N/A 1/10/2017    Procedure: EXAM UNDER ANESTHESIA ANUS;  Surgeon: Annmarie Haynes MD;  [facility]     EXAM UNDER ANESTHESIA RECTUM N/A 7/19/2018    Procedure: EXAM UNDER ANESTHESIA RECTUM;  EXAM UNDER ANESTHESIA, REMOVAL OF RECTAL FOREIGN BODY;  Surgeon: Annmarie Haynes MD;  [facility]: [facility]     HC REMOVE FECAL IMPACTION OR FB W ANESTHESIA N/A 12/18/2016    Procedure: REMOVE FECAL IMPACTION/FOREIGN BODY UNDER ANESTHESIA;  Surgeon: Soham Cano MD;  Location: [facility]     KNEE SURGERY - removed a small tissue mass from knee Right      LAPAROSCOPIC ABLATION ENDOMETRIOSIS       LAPAROTOMY EXPLORATORY N/A 1/10/2017    Procedure: LAPAROTOMY EXPLORATORY;  Surgeon: Annmarie Haynes MD;  Location: [facility]     LAPAROTOMY EXPLORATORY  09/11/2019    Manual manipulation of bowel to remove pill bottle in rectum     lymph nodes removed from neck; benign  age 7     MAMMOPLASTY REDUCTION Bilateral      RELEASE CARPAL TUNNEL Bilateral      SIGMOIDOSCOPY FLEXIBLE N/A 1/10/2017    Procedure: SIGMOIDOSCOPY FLEXIBLE;  Surgeon: Annmarie Haynes MD;  Location: [facility]      SIGMOIDOSCOPY FLEXIBLE N/A 5/8/2018    Procedure: SIGMOIDOSCOPY FLEXIBLE;  flex sig with foreign body removal using snare and rattooth forcep;  Surgeon: Soham Cano MD;  Location:  GI     SIGMOIDOSCOPY FLEXIBLE N/A 2/20/2019    Procedure: Exam under anesthesia Colonoscopy with attempted  removal of rectal foreign body;  Surgeon: Estrada Chávez MD;  Location: [facility]       Social History   Reviewed and edited as appropriate  Social History     Socioeconomic History     Marital status: Single     Spouse name: Not on file     Number of children: Not on file     Years of education: Not on file     Highest education level: Not on file   Occupational History     Occupation: On disability   Social Needs     Financial resource strain: Not on file     Food insecurity:     Worry: Not on file     Inability: Not on file     Transportation needs:     Medical: Not on file     Non-medical: Not on file   Tobacco Use     Smoking status: Never Smoker     Smokeless tobacco: Never Used   Substance and Sexual Activity     Alcohol use: No     Alcohol/week: 0.0 standard drinks     Drug use: No     Sexual activity: Yes     Partners: Male     Birth control/protection: I.U.D.     Comment: IUD - Mirena - placed July, 2015   Lifestyle     Physical activity:     Days per week: Not on file     Minutes per session: Not on file     Stress: Not on file   Relationships     Social connections:     Talks on phone: Not on file     Gets together: Not on file     Attends Anabaptist service: Not on file     Active member of club or organization: Not on file     Attends meetings of clubs or organizations: Not on file     Relationship status: Not on file     Intimate partner violence:     Fear of current or ex partner: Not on file     Emotionally abused: Not on file     Physically abused: Not on file     Forced sexual activity: Not on file   Other Topics Concern     Parent/sibling w/ CABG, MI or angioplasty before 65F 55M? Not Asked   Social  History Narrative    Single.    Living in independent living portion of [facility].    On disability.    No regular exercise.        Family History     Reviewed and edited as appropriate  Family History   Problem Relation Age of Onset     Diabetes Type 2  Maternal Grandmother      Diabetes Type 2  Paternal Grandmother      Breast Cancer Paternal Grandmother      Cerebrovascular Disease Father 53     Myocardial Infarction No family hx of      Coronary Artery Disease Early Onset No family hx of      Ovarian Cancer No family hx of      Colon Cancer No family hx of    No known history of colorectal cancer, liver disease, or inflammatory bowel disease.    Allergies   Reviewed and edited as appropriate     Allergies   Allergen Reactions     Amoxicillin-Pot Clavulanate Other (See Comments), Rash and Swelling     PN: facial swelling, left side. Also had cortisone injection the same day as she started the Augmentin.  PN: facial swelling, left side. Also had cortisone injection the same day as she started the Augmentin.  Noted in downtime recovery of chart.       Penicillins Anaphylaxis     Vancomycin Swelling, Itching and Rash     Other reaction(s): Redness  Flushed face, very itchy; HUT Comment: Flushed face, very itchy; HUT Reaction: Angioedema; HUT Reaction: Redness; HUT Severity: Med; HUT Noted: [id number]  Flushed face, very itchy  Flushed face, very itchy  Flushed face, very itchy       Hydrocodone Nausea and Vomiting and GI Disturbance     vomiting for days  vomiting for days  vomiting for days  vomiting for days, PN: vomiting for days  vomiting for days  vomiting for days  vomiting for days  vomiting for days  vomiting for days  vomiting for days, PN: vomiting for days  vomiting for days  vomiting for days  vomiting for days  vomiting for days  ; HUT Comment: vomiting for days; HUT Reaction: Gastrointestinal; HUT Reaction: Nausea And Vomiting; HUT Reaction Type: Intolerance; HUT Severity: Med; HUT Noted:  [id number]  vomiting for days       Blood-Group Specific Substance Other (See Comments)     Patient has an anti-Cw and non-specific antibodies. Blood product orders may be delayed. Draw one red top and two purple top tubes for all type/screen/crossmatch orders.     Influenza Vaccines Other (See Comments)     Oseltamivir Hives     med stopped, PN: med stopped     Cephalosporins Rash     Lamotrigine Rash     Possibly associated with Lamictal.   HUT Comment: Possibly associated with Lamictal. ; HUT Reaction: Rash; HUT Reaction Type: Allergy; HUT Severity: Low; HUT Noted: [id number]     Latex Rash        Prior to Admission Medications    (Not in a hospital admission)     Current Facility-Administered Medications   Medication     acetaminophen (TYLENOL) solution 1,000 mg     albuterol (PROAIR HFA/PROVENTIL HFA/VENTOLIN HFA) 108 (90 Base) MCG/ACT inhaler 2 puff     alum & mag hydroxide-simethicone (MYLANTA ES/MAALOX  ES) suspension 15 mL     busPIRone (BUSPAR) tablet 10 mg     calcium carbonate (TUMS) chewable tablet 500 mg     cloNIDine (CATAPRES) tablet 0.1 mg     desvenlafaxine (PRISTIQ) 24 hr tablet 100 mg     diphenhydrAMINE (BENADRYL) capsule 25 mg     diphenhydrAMINE-zinc acetate (BENADRYL) 1-0.1 % cream     enoxaparin ANTICOAGULANT (LOVENOX) injection 120 mg     fluticasone-salmeterol (ADVAIR) 100-50 MCG/DOSE diskus inhaler 1 puff     gabapentin (NEURONTIN) tablet 600 mg     lidocaine (XYLOCAINE) 2 % solution 15 mL     lurasidone (LATUDA) tablet 60 mg     melatonin tablet 1 mg     menthol (ICY HOT) 5 % patch 1 patch     metFORMIN (GLUCOPHAGE-XR) 24 hr tablet 500 mg     naloxone (NARCAN) injection 0.1-0.4 mg     omeprazole (priLOSEC) suspension 20 mg     ondansetron (ZOFRAN-ODT) ODT tab 4 mg    Or     ondansetron (ZOFRAN) injection 4 mg     ondansetron (ZOFRAN) injection 4 mg     prochlorperazine (COMPAZINE) injection 5-10 mg     simethicone (MYLICON) chewable tablet 80 mg     sodium chloride 0.9% infusion      sucralfate (CARAFATE) suspension 1 g     topiramate (TOPAMAX) tablet 100 mg     Vitamin D3 (CHOLECALCIFEROL) 25 mcg (1000 units) tablet 2,000 Units     Current Outpatient Medications   Medication Sig     acetaminophen (TYLENOL) 32 mg/mL liquid Take 31.25 mLs (1,000 mg) by mouth every 6 hours as needed for fever or pain     albuterol (PROAIR HFA) 108 (90 Base) MCG/ACT inhaler Inhale 2 puffs into the lungs 4 times daily as needed      alum & mag hydroxide-simethicone (MYLANTA/MAALOX) 200-200-20 MG/5ML SUSP suspension Take 30 mLs by mouth every 6 hours as needed for indigestion     busPIRone (BUSPAR) 10 MG tablet Take 1 tablet (10 mg) by mouth twice daily     calcium carbonate (TUMS) 500 MG chewable tablet Take 1 chew tab by mouth 3 times daily (with meals)     cloNIDine (CATAPRES) 0.1 MG tablet Take 0.1 mg by mouth 2 times daily      desvenlafaxine (PRISTIQ) 100 MG 24 hr tablet Take 1 tablet (100 mg) by mouth every morning     diphenhydrAMINE (BENADRYL) 25 MG capsule Take 1-2 capsules (25-50 mg) by mouth every 6 hours as needed for itching or sleep     docosanol (ABREVA) 10 % CREA cream Apply to lip 5 times a day as soon as symptoms begin, do not use for more than 10 days.     fluticasone-salmeterol (ADVAIR) 100-50 MCG/DOSE inhaler Inhale 1 puff into the lungs every 12 hours     levonorgestrel (MIRENA) 20 MCG/24HR IUD 1 each by Intrauterine route once     lidocaine (XYLOCAINE) 2 % solution Swish and spit 15 mLs in mouth every 6 hours as needed (soar throat)     lurasidone (LATUDA) 60 MG TABS tablet Take 1 tablet (60 mg) by mouth at bedtime     menthol (ICY HOT) 5 % PTCH Apply 1 patch topically every 8 hours as needed for muscle soreness     metFORMIN (GLUCOPHAGE-XR) 500 MG 24 hr tablet Take 1 tablet (500 mg) by mouth daily     omeprazole (PRILOSEC) 2 mg/mL suspension Take 10 mLs (20 mg) by mouth every morning (before breakfast)     ondansetron (ZOFRAN-ODT) 8 MG ODT tab Take 1 tablet (8 mg) by mouth every 6 hours as  "needed for nausea or vomiting     potassium chloride (KLOR-CON) 20 MEQ packet Take 20 mEq by mouth 2 times daily     simethicone (MYLICON) 80 MG chewable tablet Take 1 tablet (80 mg) by mouth every 6 hours as needed for flatulence or cramping (after meals)     sucralfate (CARAFATE) 1 GM/10ML suspension Take 10 mLs (1 g) by mouth 4 times daily     topiramate (TOPAMAX) 100 MG tablet Take 1 tablet (100 mg) by mouth daily at bedtime     vitamin D3 (CHOLECALCIFEROL) 2000 units (50 mcg) tablet Take 1 tablet (2,000 units) by mouth daily        Review of Systems   A complete review of systems was performed and is negative except as noted in the HPI      Physical Exam   /85   Pulse 80   Temp 97.9  F (36.6  C) (Oral)   Resp 20   Ht 1.575 m (5' 2\")   Wt 111.1 kg (245 lb)   SpO2 99%   BMI 44.81 kg/m    Wt:   Wt Readings from Last 2 Encounters:   11/28/19 111.1 kg (245 lb)   11/18/19 111.2 kg (245 lb 1.6 oz)      Constitutional: cooperative, pleasant, not dyspneic/diaphoretic, no acute distress  HEENT: Sclera anicteric/injected. Normal oropharynx without ulcers or exudate, MMM  Neck: supple, symmetric   CV: RRR, no m/g/r  Respiratory: CTAB, non-labored  Abd: Nondistended, +bs, no hepatosplenomegaly, nontender, no peritoneal signs  Skin: no concerning rashes over exposed areas, no jaundice  Neuro: AAO x 3  Psych: Flat affect  MSK: No gross deformities    Data   Labs and imaging below were independently reviewed and interpreted    BMP  Recent Labs   Lab 11/29/19 0630 11/28/19 1947   * 141   POTASSIUM 3.4 3.4   CHLORIDE 115* 109   KELBY 8.3* 9.3   CO2 24 25   BUN 8 10   CR 0.60 0.54   GLC 96 103*     CBC  Recent Labs   Lab 11/29/19 0630 11/28/19 1947   WBC 7.1 9.9   RBC 3.31* 4.00   HGB 9.5* 11.4*   HCT 30.8* 36.2   MCV 93 91   MCH 28.7 28.5   MCHC 30.8* 31.5   RDW 15.4* 15.2*    310     INR  Recent Labs   Lab 11/28/19 1947   INR 1.10     LFTs  Recent Labs   Lab 11/28/19 1947   ALKPHOS 75   AST 12 "   ALT 28   BILITOTAL 0.3   PROTTOTAL 7.1   ALBUMIN 3.5      PANCNo lab results found in last 7 days.    Imaging:  CT CAP 11/28/19  IMPRESSION:   1. Filling defect within the right interlobar and lower lobe pulmonary  artery compatible with pulmonary embolism. No perfusion deficits are  noted on the 3-D reconstruction.    2. Esophageal stent in place, esophagus is full of debris. No evidence  for esophageal perforation.    CT CAP non-con 11/27/19  IMPRESSION  Impression:   1. Thin linear radiodensity is seen along the posterior wall of esophageal stent correlating to the stretched out pen spring foreign body ingestion.  2. The metallic components at the proximal and distal end of the esophageal stent likely represent anchoring devices and are similar to the prior exam.         Previous Endoscopy:     EGD 11/27/19  Patient Profile:   30 yo w/ underlying psychiatric illness with multiple    foreign body ingestions (recent ingestion causing    perforation requiring fully covered metal stent placement)    now presents with another foreign body ingestion    (straightened out spring of a pen)    Impression:        Successful removal of the foreign body (straightened out    pen spring vs. Paper clip) accomplished using biopsy    forceps.    Recommendation:    - [facility].    - Follow up at [facility] for further management of her    esophageal stent.    - Patient instructed to strictly adhere with a post    esophageal stent diet (low fat, low residue) till she    follows with [facility].      EGD 11/10/19  Findings:       An esophageal stent was seen in mid to distal esophagus, previously        secured into place. Z-line noted at G-E junction.       An uncolied spring was found in the gastric body with no mucosal trauma        and not embedded.       The examined duodenum was normal.       The scope was withdrawn and an overtube used, keeping the distal end of        the overtube above the level  of the stent. The scope was then advanced        back into the stomach and the end of the spring was grabbed with a        forceps, slowly withdrawn through the stent and then pulled into the        overtube and out of the mouth. The overtube was then removed and the        case concluded.    Impressions/Post-Op Diagnosis:       - Foreign body ingestion, successfully removed       - Previously placed esophageal stent    EGD 10/6/19  FINDINGS:   1. Initially 2 straightened paperclips were in the proximal stomach.  2. At the end of procedure they were impaled in the GE junction and captured in a polypectomy snare   3. Incidentally noted 2-3 metal hemoclips in proximal esophagus (noted on previous endoscopies)    IMPRESSION:   Unable to remove 2 straightened paperclips. The ruber simons did not invert and the paperclips with the polypectomy snare became impaled in the GE junction.     EGD 10/5/19  Findings:       Three attached hemoclips seen in upper esophagus.       Food residue seen in stomach. A straightened paper clip was seen in the        stomach. The tip of the paper clip was grasped with a rat tooth forceps        and removed after a simons was placed over the tip of the scope to protect        the esophageal mucosa. No esophageal mucosal trauma seen after removal        of paper clip.    Impressions/Post-Op Diagnosis:       - Straightened paper clip in stomach - removed.       - Retained food residue in stomach.

## 2019-11-29 NOTE — ED NOTES
[facility], [place]   ED Nurse to Floor Handoff     Nevin Alvarado is a 29 year old female who speaks English and lives alone,  in a home  They arrived in the ED by ambulance from home    ED Chief Complaint: Hemoptysis    ED Dx;   Final diagnoses:   Acute pulmonary embolism without acute cor pulmonale, unspecified pulmonary embolism type (H)         Needed?: No    Allergies:   Allergies   Allergen Reactions     Amoxicillin-Pot Clavulanate Other (See Comments), Rash and Swelling     PN: facial swelling, left side. Also had cortisone injection the same day as she started the Augmentin.  PN: facial swelling, left side. Also had cortisone injection the same day as she started the Augmentin.  Noted in downtime recovery of chart.       Penicillins Anaphylaxis     Vancomycin Swelling, Itching and Rash     Other reaction(s): Redness  Flushed face, very itchy; HUT Comment: Flushed face, very itchy; HUT Reaction: Angioedema; HUT Reaction: Redness; HUT Severity: Med; HUT Noted: [id number]  Flushed face, very itchy  Flushed face, very itchy  Flushed face, very itchy       Hydrocodone Nausea and Vomiting and GI Disturbance     vomiting for days  vomiting for days  vomiting for days  vomiting for days, PN: vomiting for days  vomiting for days  vomiting for days  vomiting for days  vomiting for days  vomiting for days  vomiting for days, PN: vomiting for days  vomiting for days  vomiting for days  vomiting for days  vomiting for days  ; HUT Comment: vomiting for days; HUT Reaction: Gastrointestinal; HUT Reaction: Nausea And Vomiting; HUT Reaction Type: Intolerance; HUT Severity: Med; HUT Noted: [id number]  vomiting for days       Blood-Group Specific Substance Other (See Comments)     Patient has an anti-Cw and non-specific antibodies. Blood product orders may be delayed. Draw one red top and two purple top tubes for all type/screen/crossmatch orders.     Influenza Vaccines Other (See  Comments)     Oseltamivir Hives     med stopped, PN: med stopped     Cephalosporins Rash     Lamotrigine Rash     Possibly associated with Lamictal.   HUT Comment: Possibly associated with Lamictal. ; HUT Reaction: Rash; HUT Reaction Type: Allergy; HUT Severity: Low; HUT Noted: [id number]     Latex Rash   .  Past Medical Hx:   Past Medical History:   Diagnosis Date     ADD (attention deficit disorder)      Anorexia nervosa with bulimia     history of; on Topamax     Anxiety      Borderline personality disorder (H)      Depression      Depressive disorder      H/O self-harm      Lives in independent group home     due to debilitating mental illness     Migraine without aura     no known triggers; on Topamax bid and Imitrex PRN     Morbid obesity (H)      PTSD (post-traumatic stress disorder)      Rectal foreign body - Recurrent issue, self placed      Swallowed foreign body - Recurrent issue, self placed       Baseline Mental status: WDL  Current Mental Status changes: at basesline    Infection present or suspected this encounter: no  Sepsis suspected: No  Isolation type: No active isolations     Activity level - Baseline/Home:  Independent  Activity Level - Current:   Independent    Bariatric equipment needed?: No    In the ED these meds were given:   Medications   0.9% sodium chloride BOLUS (0 mLs Intravenous Stopped 11/28/19 2120)     Followed by   sodium chloride 0.9% infusion (1,000 mLs Intravenous New Bag 11/28/19 2148)   ondansetron (ZOFRAN) injection 4 mg (4 mg Intravenous Given 11/28/19 1954)   morphine (PF) injection 4 mg (4 mg Intravenous Given 11/28/19 2148)   iopamidol (ISOVUE-370) solution 73 mL (73 mLs Intravenous Given 11/28/19 2102)   sodium chloride (PF) 0.9% PF flush 100 mL (100 mLs Intravenous Given 11/28/19 2102)   diphenhydrAMINE (BENADRYL) injection 25 mg (25 mg Intravenous Given 11/28/19 2053)   diphenhydrAMINE (BENADRYL) capsule 25 mg (25 mg Oral Given 11/28/19 2302)       Drips running?   No    Home pump  No    Current LDAs  Port A Cath Single 06/26/19 Right Chest wall (Active)   Number of days: 155       Incision/Surgical Site 10/07/19 Lower;Midline Abdomen (Active)   Number of days: 52       Labs results:   Labs Ordered and Resulted from Time of ED Arrival Up to the Time of Departure from the ED   CBC WITH PLATELETS DIFFERENTIAL - Abnormal; Notable for the following components:       Result Value    Hemoglobin 11.4 (*)     RDW 15.2 (*)     All other components within normal limits   COMPREHENSIVE METABOLIC PANEL - Abnormal; Notable for the following components:    Glucose 103 (*)     All other components within normal limits   ROUTINE UA WITH MICROSCOPIC - Abnormal; Notable for the following components:    Squamous Epithelial /HPF Urine 2 (*)     All other components within normal limits   LACTIC ACID WHOLE BLOOD   INR   HCG QUALITATIVE   PERIPHERAL IV CATHETER   URINE CULTURE AEROBIC BACTERIAL       Imaging Studies:   Recent Results (from the past 24 hour(s))   CT Chest Pulmonary Embolism w Contrast    Narrative    Examination: CT CHEST PULMONARY EMBOLISM W CONTRAST, 11/28/2019 9:15  PM    Comparison: CT 10/15/2019.    History: hemoptysis (concern for PE or esophageal perf).    Technique: Axial thin slice images from the lung apices to the  diaphragm were obtained following the injection of intravenous  contrast media in the pulmonary arterial phase. Dual-energy study  performed.    Contrast dose: iopamidol (ISOVUE-370) solution 73 mL    Radiation Dose (DLP): 317 mGy*cm    Findings:   CHEST:    VESSELS AND HEART:  There is adequate contrast bolus in the study. Filling defect within  the right interlobar and lower lobe pulmonary artery consistent with  pulmonary embolism. No perfusion deficits are noted on the 3-D  reconstruction in the high iodine perfusion map. No evidence of right  heart strain. The heart is not enlarged. Thoracic aorta is within  normal limits and without evidence of dissection  "or aneurysm.  AIRWAYS: Within normal limits  LUNG: Within normal limits.  PLEURA: Within normal limits.  MEDIASTINUM AND MARANDA: Within normal limits.  CHEST WALL AND LOWER NECK: Within normal limits.  BONES: No suspicious lesions.    Esophageal stent in place. Esophagus is full of debris. One foci of  air outside of the stent is seen on series 10, image 92 this is likely  still within the esophagus. No evidence for esophageal rupture. No  mediastinal air.    UPPER ABDOMEN:  Limited evaluation of the upper abdomen demonstrates no acute  parenchymal abnormalities, nonobstructive bowel gas pattern, and no  free fluid or free air.      Impression    IMPRESSION:   1. Filling defect within the right interlobar and lower lobe pulmonary  artery compatible with pulmonary embolism. No perfusion deficits are  noted on the 3-D reconstruction.    2. Esophageal stent in place, esophagus is full of debris. No evidence  for esophageal perforation.    I have personally reviewed the examination and initial interpretation  and I agree with the findings.    RICHELLE JACKSON MD       Recent vital signs:   /80   Pulse 86   Temp 98.6  F (37  C) (Oral)   Resp 16   Ht 1.575 m (5' 2\")   Wt 111.1 kg (245 lb)   SpO2 100%   BMI 44.81 kg/m              Cardiac Rhythm: Normal Sinus  Pt needs tele? No  Skin/wound Issues: None    Code Status: Full Code    Pain control: fair    Nausea control: fair    Abnormal labs/tests/findings requiring intervention: CT    Family present during ED course? No   Family Comments/Social Situation comments: History of self placed foreign body, has had multiple surgery/endoscopies for this. Screened in ED. Also given DEC assessment in ED for worsening mental health concerns.   Tasks needing completion: None    Isadora Peacock RN    [phone]      "
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
## 2019-11-29 NOTE — PLAN OF CARE
"BP (!) 132/92   Pulse 83   Temp 98.2  F (36.8  C) (Oral)   Resp 16   Ht 1.575 m (5' 2\")   Wt 111.1 kg (245 lb)   SpO2 100%   BMI 44.81 kg/m      Discharge goal:    tolerating oral intake to maintain hydration-not met NPO    "

## 2019-11-29 NOTE — H&P
[facility], [place]    History and Physical - Gold Service        Date of Admission:  11/28/2019    Assessment & Plan   Nevin Alvarado is a 29 year old female admitted on 11/28/2019. She has a hx of borderline personality disorder, depression, PTSD, anxiety, multiple intentional and unintentional overdoses, and complex hx of foreign body ingestion/insertion c/b recent esophageal perforation now s/p esophageal stent (10/9/19) who is being admitted for p.o intolerance and hemoptysis w/new diagnosis of PE.     #Nausea, vomiting  #Throat pain  #Recent esophageal perforation s/p stent  On presentation to ED unable to tolerate any p.o due to nausea and NBNB emesis after eating solid thanksgiving foods despite instruction for strict liquid diet while esophageal stent in place. Debris in stent on CT imaging on admission. Throat pain likely secondary to stent placement and solid foods thru stent on day of admission. Was seen by thoracic surgery in ED who recommended monitoring  -Zofran + compazine prn  -Morphine IV 0.2-0.4mg q4  -Per thoracic surg consult: needs to follow up with thoracic surgery for removal of esophageal stent on 12/3  -Continue home omeprazole, sucralfate  -Holding home K, recheck BMP in am    #New PE   Newly found on CTA on admission in light of acute hemoptysis symptoms, with several risk factors including obesity, many recent procedures and stent surgery. Hemodynamically stable on room air, denies dyspnea.   -Consider US of lower extremities in am to verify if DVT given R calf pain  -Therapeutic lovenox initiated in ED    #Complex psychosocial hx  #Depression  #Anxiety  #Self injurious behavior  #Compulsive foreign body ingestion/insertion  Mental health assessment in ED and patient interview confirms no active self harm intent, but is in need of more intensive outpatient psych follow up.   -Continue home meds: gabapentin 600mg TID, lurasidone, buspirone,  topiramate desvenlafaxine  -1:1 sitter     #Chronic normocytic anemia  Suspect multifactorial, with likely micro bleeds from recurrent ingestion/insertions, iron deficiency, and poor diet.      Diet: NPO for now, Liquid diet if no scope  Fluids: None  DVT Prophylaxis: Lovenox treatment  Hazel Catheter: not present  Code Status: Full    Disposition Plan   Expected discharge: Tomorrow, recommended to prior living arrangement once able to tolerate p.o.  Entered: Judith Sandoval MD 11/29/2019, 1:13 AM       Judith Sandoval MD  [facility], [place]  Please see sticky note for cross cover information  ______________________________________________________________________    Chief Complaint   Hemoptysis    History is obtained from the patient    History of Present Illness   Nevin Alvarado is a 29 year old female who presents with one day of small vol hemoptysis, throat and upper back pain, and nausea/vomiting.    States that she has been feeling poorly ever since had the stent place, and has been adherent to the liquid/mechanical soft diet as instructed. However on thanksgiving had some more solid foods than normal including cheesy hashbrowns, green bean casserole, potatoes. Afterwards started to have heavy cough and noted new hemoptysis, small quarter sized clots. Has not had more hemoptysis in ED. Also started feeling more severe throat and upper back pain so came into ED. Since arrival to ED has had several episodes of NBNB vomiting. She denies hx of hematemesis, hematochezia or melena. States she is too nauseous to take in any po liquids.    Has not taken home meds today and hasn't been very consistent as she hasn't been able to tolerate much oral intake and also has been feeling very depressed with her restrictive diet. Notes recent ingestion and admission to [facility]. 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     Review of Systems    The 10 point Review of Systems is negative other than noted in the HPI.    Past Medical History    I have reviewed this patient's medical history and updated it with pertinent information if needed.   Past Medical History:   Diagnosis Date     ADD (attention deficit disorder)      Anorexia nervosa with bulimia     history of; on Topamax     Anxiety      Borderline personality disorder (H)      Depression      Depressive disorder      H/O self-harm      Lives in independent group home     due to debilitating mental illness     Migraine without aura     no known triggers; on Topamax bid and Imitrex PRN     Morbid obesity (H)      PTSD (post-traumatic stress disorder)      Rectal foreign body - Recurrent issue, self placed      Swallowed foreign body - Recurrent issue, self placed         Past Surgical History   I have reviewed this patient's surgical history and updated it with pertinent information if needed.  Past Surgical History:   Procedure Laterality Date     COMBINED ESOPHAGOSCOPY, GASTROSCOPY, DUODENOSCOPY (EGD), REPLACE ESOPHAGEAL STENT N/A 10/9/2019    Procedure: Upper Endoscopy with Suture Placement;  Surgeon: Zurdo Ramirez MD;  [facility]: [facility]     ESOPHAGOSCOPY, GASTROSCOPY, DUODENOSCOPY (EGD), COMBINED N/A 3/9/2017    Procedure: COMBINED ESOPHAGOSCOPY, GASTROSCOPY, DUODENOSCOPY (EGD), REMOVE FOREIGN BODY;  Surgeon: Avis Guzmán MD;  Location: [facility]     ESOPHAGOSCOPY, GASTROSCOPY, DUODENOSCOPY (EGD), COMBINED N/A 4/20/2017    Procedure: COMBINED ESOPHAGOSCOPY, GASTROSCOPY, DUODENOSCOPY (EGD), REMOVE FOREIGN BODY;  EGD removal Foregin body;  Surgeon: Lokesh Paula MD;  Location: [facility]     ESOPHAGOSCOPY, GASTROSCOPY, DUODENOSCOPY (EGD), COMBINED N/A 6/12/2017    Procedure: COMBINED ESOPHAGOSCOPY, GASTROSCOPY, DUODENOSCOPY (EGD);  COMBINED ESOPHAGOSCOPY,  GASTROSCOPY, DUODENOSCOPY (EGD) [[id number]]attempted removal of foreign body;  Surgeon: Pamela Perez MD;  [facility]     ESOPHAGOSCOPY, GASTROSCOPY, DUODENOSCOPY (EGD), COMBINED N/A 6/9/2017    Procedure: COMBINED ESOPHAGOSCOPY, GASTROSCOPY, DUODENOSCOPY (EGD), REMOVE FOREIGN BODY;  Esophagoscopy, Gastroscopy, Duodenoscopy, Removal of Foreign Body;  Surgeon: Dejon Marsh MD;  [facility]: [facility]     ESOPHAGOSCOPY, GASTROSCOPY, DUODENOSCOPY (EGD), COMBINED N/A 1/6/2018    Procedure: COMBINED ESOPHAGOSCOPY, GASTROSCOPY, DUODENOSCOPY (EGD), REMOVE FOREIGN BODY;  COMBINED ESOPHAGOSCOPY, GASTROSCOPY, DUODENOSCOPY (EGD) [by pascal net and snare with profol sedation;  Surgeon: Feliciano Emmanuel MD;  Location:  [facility]     ESOPHAGOSCOPY, GASTROSCOPY, DUODENOSCOPY (EGD), COMBINED N/A 3/19/2018    Procedure: COMBINED ESOPHAGOSCOPY, GASTROSCOPY, DUODENOSCOPY (EGD), REMOVE FOREIGN BODY;   Esophagodscopy, Gastroscopy, Duodenoscopy,Foreign Body Removal;  Surgeon: Brice Guzmán MD;  [facility]     ESOPHAGOSCOPY, GASTROSCOPY, DUODENOSCOPY (EGD), COMBINED N/A 4/16/2018    Procedure: COMBINED ESOPHAGOSCOPY, GASTROSCOPY, DUODENOSCOPY (EGD), REMOVE FOREIGN BODY;  Esophagogastroduodenoscopy  Foreign Body Removal X 2;  Surgeon: Royer Moise MD;  [facility]     ESOPHAGOSCOPY, GASTROSCOPY, DUODENOSCOPY (EGD), COMBINED N/A 6/1/2018    Procedure: COMBINED ESOPHAGOSCOPY, GASTROSCOPY, DUODENOSCOPY (EGD), REMOVE FOREIGN BODY;  COMBINED ESOPHAGOSCOPY, GASTROSCOPY, DUODENOSCOPY with removal of foreign body, propofol sedation by anesthesia;  Surgeon: Brice Martinez MD;  Location:  GI     ESOPHAGOSCOPY, GASTROSCOPY, DUODENOSCOPY (EGD), COMBINED N/A 7/25/2018    Procedure: COMBINED ESOPHAGOSCOPY, GASTROSCOPY, DUODENOSCOPY (EGD), REMOVE FOREIGN BODY;;  Surgeon: Candy Castelan MD;  Location:  GI     ESOPHAGOSCOPY, GASTROSCOPY, DUODENOSCOPY (EGD), COMBINED N/A 7/28/2018     Procedure: COMBINED ESOPHAGOSCOPY, GASTROSCOPY, DUODENOSCOPY (EGD), REMOVE FOREIGN BODY;  COMBINED ESOPHAGOSCOPY, GASTROSCOPY, DUODENOSCOPY (EGD), REMOVE FOREIGN BODY;  Surgeon: Brice Guzmán MD;  [facility]     ESOPHAGOSCOPY, GASTROSCOPY, DUODENOSCOPY (EGD), COMBINED N/A 7/31/2018    Procedure: COMBINED ESOPHAGOSCOPY, GASTROSCOPY, DUODENOSCOPY (EGD);  COMBINED ESOPHAGOSCOPY, GASTROSCOPY, DUODENOSCOPY (EGD) TO REMOVE FOREIGN BODY;  Surgeon: Lokesh Paula MD;  [facility]     ESOPHAGOSCOPY, GASTROSCOPY, DUODENOSCOPY (EGD), COMBINED N/A 8/4/2018    Procedure: COMBINED ESOPHAGOSCOPY, GASTROSCOPY, DUODENOSCOPY (EGD), REMOVE FOREIGN BODY;   combined esophagoscopy, gastroscopy, duodenoscopy, REMOVE FOREIGN BODY ;  Surgeon: Lokesh Paula MD;  [facility]     ESOPHAGOSCOPY, GASTROSCOPY, DUODENOSCOPY (EGD), COMBINED N/A 10/6/2019    Procedure: ESOPHAGOGASTRODUODENOSCOPY (EGD) with fireign body removal x2, esophageal stent placement with floroscopy;  Surgeon: Timoteo Espana MD;  [facility]     EXAM UNDER ANESTHESIA ANUS N/A 1/10/2017    Procedure: EXAM UNDER ANESTHESIA ANUS;  Surgeon: Annmarie Haynes MD;  [facility]     EXAM UNDER ANESTHESIA RECTUM N/A 7/19/2018    Procedure: EXAM UNDER ANESTHESIA RECTUM;  EXAM UNDER ANESTHESIA, REMOVAL OF RECTAL FOREIGN BODY;  Surgeon: Annmarie Haynes MD;  [facility]: [facility]     HC REMOVE FECAL IMPACTION OR FB W ANESTHESIA N/A 12/18/2016    Procedure: REMOVE FECAL IMPACTION/FOREIGN BODY UNDER ANESTHESIA;  Surgeon: Soham Cano MD;  Location: [facility]     KNEE SURGERY - removed a small tissue mass from knee Right      LAPAROSCOPIC ABLATION ENDOMETRIOSIS       LAPAROTOMY EXPLORATORY N/A 1/10/2017    Procedure: LAPAROTOMY EXPLORATORY;  Surgeon: Annmarie Haynes MD;  [facility]: [facility]     LAPAROTOMY EXPLORATORY  09/11/2019    Manual manipulation of bowel to remove pill bottle in rectum     lymph nodes removed from neck; benign   age 7     MAMMOPLASTY REDUCTION Bilateral      RELEASE CARPAL TUNNEL Bilateral      SIGMOIDOSCOPY FLEXIBLE N/A 1/10/2017    Procedure: SIGMOIDOSCOPY FLEXIBLE;  Surgeon: Annmarie Haynes MD;  [facility]: [facility]     SIGMOIDOSCOPY FLEXIBLE N/A 5/8/2018    Procedure: SIGMOIDOSCOPY FLEXIBLE;  flex sig with foreign body removal using snare and rattooth forcep;  Surgeon: Soham Cano MD;  Location:  GI     SIGMOIDOSCOPY FLEXIBLE N/A 2/20/2019    Procedure: Exam under anesthesia Colonoscopy with attempted  removal of rectal foreign body;  Surgeon: Estrada Chávez MD;  Location: [facility]        Social History   I have reviewed this patient's social history and updated it with pertinent information if needed. Nevin Alvarado  reports that she has never smoked. She has never used smokeless tobacco. She reports that she does not drink alcohol or use drugs.    Family History   I have reviewed this patient's family history and updated it with pertinent information if needed.   Family History   Problem Relation Age of Onset     Diabetes Type 2  Maternal Grandmother      Diabetes Type 2  Paternal Grandmother      Breast Cancer Paternal Grandmother      Cerebrovascular Disease Father 53     Myocardial Infarction No family hx of      Coronary Artery Disease Early Onset No family hx of      Ovarian Cancer No family hx of      Colon Cancer No family hx of        Prior to Admission Medications   Prior to Admission Medications   Prescriptions Last Dose Informant Patient Reported? Taking?   acetaminophen (TYLENOL) 32 mg/mL liquid   No No   Sig: Take 31.25 mLs (1,000 mg) by mouth every 6 hours as needed for fever or pain   albuterol (PROAIR HFA) 108 (90 Base) MCG/ACT inhaler  Self Yes No   Sig: Inhale 2 puffs into the lungs 4 times daily as needed    alum & mag hydroxide-simethicone (MYLANTA/MAALOX) 200-200-20 MG/5ML SUSP suspension   Yes No   Sig: Take 30 mLs by mouth every 6 hours as needed for indigestion   busPIRone  (BUSPAR) 10 MG tablet  Self Yes No   Sig: Take 1 tablet (10 mg) by mouth twice daily   calcium carbonate (TUMS) 500 MG chewable tablet   Yes No   Sig: Take 1 chew tab by mouth 3 times daily (with meals)   cloNIDine (CATAPRES) 0.1 MG tablet  Self Yes No   Sig: Take 0.1 mg by mouth 2 times daily    desvenlafaxine (PRISTIQ) 100 MG 24 hr tablet  Self Yes No   Sig: Take 1 tablet (100 mg) by mouth every morning   diphenhydrAMINE (BENADRYL) 25 MG capsule  Self Yes No   Sig: Take 1-2 capsules (25-50 mg) by mouth every 6 hours as needed for itching or sleep   docosanol (ABREVA) 10 % CREA cream   Yes No   Sig: Apply to lip 5 times a day as soon as symptoms begin, do not use for more than 10 days.   fluticasone-salmeterol (ADVAIR) 100-50 MCG/DOSE inhaler   Yes No   Sig: Inhale 1 puff into the lungs every 12 hours   gabapentin (NEURONTIN) 600 MG tablet  Self Yes No   Sig: Take 600 mg by mouth 3 times daily    levonorgestrel (MIRENA) 20 MCG/24HR IUD   Yes No   Si each by Intrauterine route once   lidocaine (XYLOCAINE) 2 % solution   No No   Sig: Swish and spit 15 mLs in mouth every 6 hours as needed (soar throat)   lurasidone (LATUDA) 60 MG TABS tablet  Self Yes No   Sig: Take 1 tablet (60 mg) by mouth at bedtime   menthol (ICY HOT) 5 % PTCH  Self No No   Sig: Apply 1 patch topically every 8 hours as needed for muscle soreness   metFORMIN (GLUCOPHAGE-XR) 500 MG 24 hr tablet  Self Yes No   Sig: Take 1 tablet (500 mg) by mouth daily   nystatin (MYCOSTATIN) 764720 UNIT/ML suspension   No No   Sig: Take 10 mLs (1,000,000 Units) by mouth 4 times daily for 7 days   omeprazole (PRILOSEC) 2 mg/mL suspension   No No   Sig: Take 10 mLs (20 mg) by mouth every morning (before breakfast)   ondansetron (ZOFRAN-ODT) 8 MG ODT tab  Self No No   Sig: Take 1 tablet (8 mg) by mouth every 6 hours as needed for nausea or vomiting   potassium chloride (KLOR-CON) 20 MEQ packet   No No   Sig: Take 20 mEq by mouth 2 times daily   simethicone  (MYLICON) 80 MG chewable tablet   No No   Sig: Take 1 tablet (80 mg) by mouth every 6 hours as needed for flatulence or cramping (after meals)   sucralfate (CARAFATE) 1 GM/10ML suspension   No No   Sig: Take 10 mLs (1 g) by mouth 4 times daily   topiramate (TOPAMAX) 100 MG tablet  Self Yes No   Sig: Take 1 tablet (100 mg) by mouth daily at bedtime   vitamin D3 (CHOLECALCIFEROL) 2000 units (50 mcg) tablet  Self Yes No   Sig: Take 1 tablet (2,000 units) by mouth daily      Facility-Administered Medications: None     Allergies   Allergies   Allergen Reactions     Amoxicillin-Pot Clavulanate Other (See Comments), Rash and Swelling     PN: facial swelling, left side. Also had cortisone injection the same day as she started the Augmentin.  PN: facial swelling, left side. Also had cortisone injection the same day as she started the Augmentin.  Noted in downtime recovery of chart.       Penicillins Anaphylaxis     Vancomycin Swelling, Itching and Rash     Other reaction(s): Redness  Flushed face, very itchy; HUT Comment: Flushed face, very itchy; HUT Reaction: Angioedema; HUT Reaction: Redness; HUT Severity: Med; HUT Noted: [id number]  Flushed face, very itchy  Flushed face, very itchy  Flushed face, very itchy       Hydrocodone Nausea and Vomiting and GI Disturbance     vomiting for days  vomiting for days  vomiting for days  vomiting for days, PN: vomiting for days  vomiting for days  vomiting for days  vomiting for days  vomiting for days  vomiting for days  vomiting for days, PN: vomiting for days  vomiting for days  vomiting for days  vomiting for days  vomiting for days  ; HUT Comment: vomiting for days; HUT Reaction: Gastrointestinal; HUT Reaction: Nausea And Vomiting; HUT Reaction Type: Intolerance; HUT Severity: Med; HUT Noted: [id number]  vomiting for days       Blood-Group Specific Substance Other (See Comments)     Patient has an anti-Cw and non-specific antibodies. Blood product orders may be delayed. Draw one red  "top and two purple top tubes for all type/screen/crossmatch orders.     Influenza Vaccines Other (See Comments)     Oseltamivir Hives     med stopped, PN: med stopped     Cephalosporins Rash     Lamotrigine Rash     Possibly associated with Lamictal.   HUT Comment: Possibly associated with Lamictal. ; HUT Reaction: Rash; HUT Reaction Type: Allergy; HUT Severity: Low; HUT Noted: [id number]     Latex Rash       Physical Exam   Vital Signs: Temp: 98.6  F (37  C) Temp src: Oral BP: 136/80 Pulse: 86   Resp: 16 SpO2: 100 % O2 Device: None (Room air)    Weight: 245 lbs 0 oz    GEN Woman sitting up in bed, no acute distress  HEENT Sclera clear, no rhinorrhea, Oropharynx without any signs of active bleeding  CV Regular rate and rhythm, nl s1 and s2, no murmur, rub or gallop.   RESP No increased work of breathing on room air, CTAB  ABD Soft, tender to palpation in RLQ and LUQ over surgical scars, non distended, +BS  EXT Warm, dry and well perfused. Pain to palpation over R calf.   SKIN Port in R chest without any surrounding erythema  NEURO Alert and oriented, face symmetric. Moves all extremities equally  PSYCH States she is doing \"pretty bad.\" Affect is flat but during interview starts to cry about losing support of her independent living facilitator. Denies SI. Feels safe in hospital.     Data   Data reviewed today: I reviewed all medications, new labs and imaging results over the last 24 hours.   "

## 2019-11-29 NOTE — PHARMACY-ADMISSION MEDICATION HISTORY
Admission medication history interview status for the 11/28/2019 admission is complete. See Epic admission navigator for allergy information, pharmacy, prior to admission medications and immunization status.     Medication history interview sources:  patient, [facility] prescription fill records     Changes made to PTA medication list (reason)  Added:   -oxycodone 5mg Q4H PRN pain (patient has but rarely uses)  Deleted:   -Advair inhaler- patient states she stopped using this medication about 1 month ago. She doesn't plan to resume this medication.  -Menthol patch- patient states these do not work for her, she does not use them.  -Nystatin suspension- patient was using for thrush which has resolved, course of nystatin completed  Changed:   -calcium carbonate changed from TID to TID PRN  -omeprazole suspension from daily to daily PRN    Additional medication history information (including reliability of information, actions taken by pharmacist):  -Patient was a reliable historian  -Patient did not take any medication at home yesterday (11/28/19), last doses of her own medication at home were all on 11/27/19  -Patient was prescribed potassium chloride and sucralfate suspension but was not able to pick them up when they were first prescribed, and therefore never started taking them. These were left on patient's PTA med list but not marked as 'taking'.  - During interview patient also noted that lidocaine patches have not worked for her in the past.    Prior to Admission medications    Medication Sig Last Dose Taking? Auth Provider   acetaminophen (TYLENOL) 32 mg/mL liquid Take 31.25 mLs (1,000 mg) by mouth every 6 hours as needed for fever or pain Past Week at Unknown time Yes Dominga Red MD   albuterol (PROAIR HFA) 108 (90 Base) MCG/ACT inhaler Inhale 2 puffs into the lungs 4 times daily as needed  Past Week at Unknown time Yes Reported, Patient   alum & mag hydroxide-simethicone (MYLANTA/MAALOX) 200-200-20  MG/5ML SUSP suspension Take 30 mLs by mouth every 6 hours as needed for indigestion Past Week at Unknown time Yes Unknown, Entered By History   busPIRone (BUSPAR) 10 MG tablet Take 1 tablet (10 mg) by mouth twice daily 11/27/2019 Yes Unknown, Entered By History   calcium carbonate (TUMS) 500 MG chewable tablet Take 1 chew tab by mouth 3 times daily as needed  Past Week at Unknown time Yes Unknown, Entered By History   cloNIDine (CATAPRES) 0.1 MG tablet Take 0.1 mg by mouth 2 times daily  11/27/2019 Yes Reported, Patient   desvenlafaxine (PRISTIQ) 100 MG 24 hr tablet Take 1 tablet (100 mg) by mouth every morning 11/27/2019 Yes Reported, Patient   diphenhydrAMINE (BENADRYL) 25 MG capsule Take 1-2 capsules (25-50 mg) by mouth every 6 hours as needed for itching or sleep Past Week at Unknown time Yes Unknown, Entered By History   docosanol (ABREVA) 10 % CREA cream Apply to lip 5 times a day as soon as symptoms begin, do not use for more than 10 days. More than a month Yes Unknown, Entered By History   gabapentin (NEURONTIN) 600 MG tablet Take 600 mg by mouth 3 times daily  11/27/2019 Yes Reported, Patient   levonorgestrel (MIRENA) 20 MCG/24HR IUD 1 each by Intrauterine route once  Yes Unknown, Entered By History   lidocaine (XYLOCAINE) 2 % solution Swish and spit 15 mLs in mouth every 6 hours as needed (soar throat) Past Month at Unknown time Yes Dominga Red MD   lurasidone (LATUDA) 60 MG TABS tablet Take 1 tablet (60 mg) by mouth at bedtime 11/27/2019 Yes Reported, Patient   metFORMIN (GLUCOPHAGE-XR) 500 MG 24 hr tablet Take 1 tablet (500 mg) by mouth daily 11/27/2019 Yes Unknown, Entered By History   omeprazole (PRILOSEC) 2 mg/mL suspension Take 10 mLs (20 mg) by mouth every morning (before breakfast)  Patient taking differently: Take 20 mg by mouth daily as needed  Past Month at Unknown time Yes Margie Razo MD   ondansetron (ZOFRAN-ODT) 8 MG ODT tab Take 1 tablet (8 mg) by mouth every 6 hours as  needed for nausea or vomiting Past Month at Unknown time Yes Jaxon Flores PA-C   oxyCODONE (ROXICODONE) 5 MG tablet Take 5 mg by mouth every 4 hours as needed (pain) (patient rarely uses) Past Month at Unknown time Yes Unknown, Entered By History   simethicone (MYLICON) 80 MG chewable tablet Take 1 tablet (80 mg) by mouth every 6 hours as needed for flatulence or cramping (after meals) Past Month at Unknown time Yes Dominga Red MD   topiramate (TOPAMAX) 100 MG tablet Take 1 tablet (100 mg) by mouth daily at bedtime 11/27/2019 Yes Unknown, Entered By History   vitamin D3 (CHOLECALCIFEROL) 2000 units (50 mcg) tablet Take 1 tablet (2,000 units) by mouth daily 11/27/2019 Yes Unknown, Entered By History   potassium chloride (KLOR-CON) 20 MEQ packet Take 20 mEq by mouth 2 times daily Patient did not  this prescription, never started taking  Dilcia Scales,    sucralfate (CARAFATE) 1 GM/10ML suspension Take 10 mLs (1 g) by mouth 4 times daily Patient did not  this prescription, never started taking  Margie Razo MD       Medication history completed by Kayla MataD

## 2019-11-30 PROBLEM — K22.2 ESOPHAGEAL STRICTURE: Status: ACTIVE | Noted: 2019-11-30

## 2019-11-30 PROBLEM — I26.99 PULMONARY EMBOLISM (H): Status: ACTIVE | Noted: 2019-11-30

## 2019-11-30 LAB
BACTERIA SPEC CULT: NORMAL
SPECIMEN SOURCE: NORMAL

## 2019-11-30 PROCEDURE — 96361 HYDRATE IV INFUSION ADD-ON: CPT

## 2019-11-30 PROCEDURE — 25800030 ZZH RX IP 258 OP 636: Performed by: EMERGENCY MEDICINE

## 2019-11-30 PROCEDURE — 12000001 ZZH R&B MED SURG/OB UMMC

## 2019-11-30 PROCEDURE — 25000132 ZZH RX MED GY IP 250 OP 250 PS 637: Performed by: STUDENT IN AN ORGANIZED HEALTH CARE EDUCATION/TRAINING PROGRAM

## 2019-11-30 PROCEDURE — 25000128 H RX IP 250 OP 636: Performed by: STUDENT IN AN ORGANIZED HEALTH CARE EDUCATION/TRAINING PROGRAM

## 2019-11-30 PROCEDURE — 99232 SBSQ HOSP IP/OBS MODERATE 35: CPT | Performed by: STUDENT IN AN ORGANIZED HEALTH CARE EDUCATION/TRAINING PROGRAM

## 2019-11-30 PROCEDURE — G0378 HOSPITAL OBSERVATION PER HR: HCPCS

## 2019-11-30 PROCEDURE — 25000128 H RX IP 250 OP 636: Performed by: EMERGENCY MEDICINE

## 2019-11-30 PROCEDURE — 96372 THER/PROPH/DIAG INJ SC/IM: CPT

## 2019-11-30 RX ORDER — ONDANSETRON 4 MG/1
8 TABLET, ORALLY DISINTEGRATING ORAL EVERY 6 HOURS PRN
Status: DISCONTINUED | OUTPATIENT
Start: 2019-11-30 | End: 2019-11-30

## 2019-11-30 RX ORDER — LIDOCAINE 40 MG/G
CREAM TOPICAL
Status: DISCONTINUED | OUTPATIENT
Start: 2019-11-30 | End: 2019-12-05 | Stop reason: HOSPADM

## 2019-11-30 RX ORDER — HEPARIN SODIUM,PORCINE 10 UNIT/ML
5-10 VIAL (ML) INTRAVENOUS EVERY 24 HOURS
Status: DISCONTINUED | OUTPATIENT
Start: 2019-11-30 | End: 2019-12-05 | Stop reason: HOSPADM

## 2019-11-30 RX ORDER — POTASSIUM CHLORIDE 1.5 G/1.58G
20 POWDER, FOR SOLUTION ORAL 2 TIMES DAILY
Status: DISCONTINUED | OUTPATIENT
Start: 2019-11-30 | End: 2019-12-03

## 2019-11-30 RX ORDER — HEPARIN SODIUM (PORCINE) LOCK FLUSH IV SOLN 100 UNIT/ML 100 UNIT/ML
5 SOLUTION INTRAVENOUS
Status: DISCONTINUED | OUTPATIENT
Start: 2019-11-30 | End: 2019-12-05 | Stop reason: HOSPADM

## 2019-11-30 RX ORDER — HEPARIN SODIUM,PORCINE 10 UNIT/ML
5-10 VIAL (ML) INTRAVENOUS
Status: DISCONTINUED | OUTPATIENT
Start: 2019-11-30 | End: 2019-12-05 | Stop reason: HOSPADM

## 2019-11-30 RX ADMIN — OXYCODONE HYDROCHLORIDE 5 MG: 5 TABLET ORAL at 09:08

## 2019-11-30 RX ADMIN — Medication 5 ML: at 17:49

## 2019-11-30 RX ADMIN — SUCRALFATE 1 G: 1 SUSPENSION ORAL at 12:10

## 2019-11-30 RX ADMIN — ENOXAPARIN SODIUM 120 MG: 120 INJECTION SUBCUTANEOUS at 19:58

## 2019-11-30 RX ADMIN — CLONIDINE HYDROCHLORIDE 0.1 MG: 0.1 TABLET ORAL at 09:14

## 2019-11-30 RX ADMIN — LURASIDONE HYDROCHLORIDE 60 MG: 40 TABLET, FILM COATED ORAL at 21:32

## 2019-11-30 RX ADMIN — OXYCODONE HYDROCHLORIDE 5 MG: 5 TABLET ORAL at 19:58

## 2019-11-30 RX ADMIN — METFORMIN HYDROCHLORIDE 500 MG: 500 TABLET, EXTENDED RELEASE ORAL at 16:09

## 2019-11-30 RX ADMIN — ONDANSETRON 4 MG: 2 INJECTION INTRAMUSCULAR; INTRAVENOUS at 17:49

## 2019-11-30 RX ADMIN — SODIUM CHLORIDE 1000 ML: 9 INJECTION, SOLUTION INTRAVENOUS at 12:05

## 2019-11-30 RX ADMIN — OXYCODONE HYDROCHLORIDE 5 MG: 5 TABLET ORAL at 02:02

## 2019-11-30 RX ADMIN — GABAPENTIN 600 MG: 600 TABLET, FILM COATED ORAL at 16:09

## 2019-11-30 RX ADMIN — BUSPIRONE HYDROCHLORIDE 10 MG: 10 TABLET ORAL at 19:58

## 2019-11-30 RX ADMIN — SODIUM CHLORIDE 1000 ML: 9 INJECTION, SOLUTION INTRAVENOUS at 03:10

## 2019-11-30 RX ADMIN — TOPIRAMATE 100 MG: 50 TABLET ORAL at 21:32

## 2019-11-30 RX ADMIN — MELATONIN 2000 UNITS: at 09:08

## 2019-11-30 RX ADMIN — GABAPENTIN 600 MG: 600 TABLET, FILM COATED ORAL at 19:58

## 2019-11-30 RX ADMIN — DESVENLAFAXINE 100 MG: 50 TABLET, FILM COATED, EXTENDED RELEASE ORAL at 09:14

## 2019-11-30 RX ADMIN — POTASSIUM CHLORIDE 20 MEQ: 1.5 POWDER, FOR SOLUTION ORAL at 19:58

## 2019-11-30 RX ADMIN — ACETAMINOPHEN 1000 MG: 325 SOLUTION ORAL at 09:08

## 2019-11-30 RX ADMIN — BUSPIRONE HYDROCHLORIDE 10 MG: 10 TABLET ORAL at 09:14

## 2019-11-30 RX ADMIN — SUCRALFATE 1 G: 1 SUSPENSION ORAL at 19:57

## 2019-11-30 RX ADMIN — CLONIDINE HYDROCHLORIDE 0.1 MG: 0.1 TABLET ORAL at 19:58

## 2019-11-30 RX ADMIN — ACETAMINOPHEN 1000 MG: 325 SOLUTION ORAL at 22:40

## 2019-11-30 RX ADMIN — SUCRALFATE 1 G: 1 SUSPENSION ORAL at 09:08

## 2019-11-30 RX ADMIN — CALCIUM CARBONATE (ANTACID) CHEW TAB 500 MG 500 MG: 500 CHEW TAB at 17:49

## 2019-11-30 RX ADMIN — OXYCODONE HYDROCHLORIDE 5 MG: 5 TABLET ORAL at 16:09

## 2019-11-30 RX ADMIN — Medication: at 21:32

## 2019-11-30 RX ADMIN — ENOXAPARIN SODIUM 120 MG: 120 INJECTION SUBCUTANEOUS at 09:15

## 2019-11-30 RX ADMIN — ACETAMINOPHEN 1000 MG: 325 SOLUTION ORAL at 16:09

## 2019-11-30 RX ADMIN — CALCIUM CARBONATE (ANTACID) CHEW TAB 500 MG 500 MG: 500 CHEW TAB at 12:10

## 2019-11-30 RX ADMIN — GABAPENTIN 600 MG: 600 TABLET, FILM COATED ORAL at 09:08

## 2019-11-30 RX ADMIN — SUCRALFATE 1 G: 1 SUSPENSION ORAL at 16:09

## 2019-11-30 RX ADMIN — CALCIUM CARBONATE (ANTACID) CHEW TAB 500 MG 500 MG: 500 CHEW TAB at 09:08

## 2019-11-30 RX ADMIN — OMEPRAZOLE 20 MG: KIT at 09:13

## 2019-11-30 ASSESSMENT — ACTIVITIES OF DAILY LIVING (ADL)
ADLS_ACUITY_SCORE: 12
ADLS_ACUITY_SCORE: 9

## 2019-11-30 ASSESSMENT — PAIN DESCRIPTION - DESCRIPTORS: DESCRIPTORS: SORE

## 2019-11-30 NOTE — PROVIDER NOTIFICATION
"Provider paged RE: \"Please adjust suicide precautions order and place a transfer order since she's INPT. Thank you!\"'    Provider called to clarify, stated he would change the order  "

## 2019-11-30 NOTE — PLAN OF CARE
Observation Goals Prior to Discharge:  -tolerating oral intake to maintain hydration: Yes, but with pain when swallowing     Pt stated she has stabbing pain in her throat, gave 5 mg oxycodone  and tylenol.No other complaints,

## 2019-11-30 NOTE — PROVIDER NOTIFICATION
"Provider paged RE: \"Pt upset that she is on a 1:1, says the admitting doc yesterday discontinued it but her H&P states she needs one. Can someone come address this with her please? Thank you!\"    Provider to go see the pt  "

## 2019-11-30 NOTE — PLAN OF CARE
"BP (!) 149/94 (BP Location: Left arm)   Pulse 82   Temp 98.2  F (36.8  C) (Oral)   Resp 18   Ht 1.575 m (5' 2\")   Wt 112.9 kg (248 lb 12.8 oz)   SpO2 97%   BMI 45.51 kg/m      Observation goals to be met before discharge:     tolerating oral intake to maintain hydration-not met, on clear liquid diet. Pt reported she couldn't swallow cold Jello because of pain. C/o nausea, prn Zofran 4 mg po given.   "

## 2019-11-30 NOTE — PLAN OF CARE
"Observation Goals prior to Discharge:  -tolerating oral intake to maintain hydration: In progress  /88 (BP Location: Left arm)   Pulse 88   Temp 97.8  F (36.6  C) (Oral)   Resp 17   Ht 1.575 m (5' 2\")   Wt 112.9 kg (248 lb 12.8 oz)   SpO2 97%   BMI 45.51 kg/m      VSS. Pt is resting comfortably. Sitter at bedside.   "

## 2019-11-30 NOTE — PROGRESS NOTES
[facility], [facility] Progress Note - Hospitalist Service, Gold 8       Date of Admission:  11/28/2019  Assessment & Plan   Nevin Alvarado is a 29 year old female admitted on 11/28/2019. She has a hx of borderline personality disorder, depression, PTSD, anxiety, multiple intentional and unintentional overdoses, and complex hx of foreign body ingestion/insertion c/b recent esophageal perforation now s/p esophageal stent (10/9/19) who is being admitted for p.o intolerance and hemoptysis w/new diagnosis of PE.     TODAY  - Stop IVFs  - Advance to full liquid diet  - Continue 1:1 sitter, but ok to have utensils  - Per thoracic surgery, will add on to OR on Monday to have esophageal stent removed     #  Interlobar/lower lobe emboli in the R pulmonary artery, Provoked  - No chest pain, dyspnea, on RA breathing well, hemoptysis resolved.  No sign of R heart strain on CTA.  Will hold off on further cardiac workup (TTE, trop, BNP).   - In the setting of recent appendectomy and esophageal rupture and stent placement (10/5/2019), appendectomy (11/11/2019) and being sedentary. No personal or FH of hypercoagubility, no miscarriage.  Will call this a provoked, first time PE.  Unusual that she doesn't have DVT, but possibly formed a clot in the leg and that solitary clot went to her lung.   - Has levonorgestrol IUD in place which is ok to continue (Arterioscler Thromb Vasc Biol. 2010;30:0746-0929.)  - Enox continued (hx of N/V, doesn't think she can consistently take PO).      # N/V, sore throat  #Recent esophageal perforation s/p stent   - Given recent esoph perf, GI and thoracic surgery were consulted and recommended no intervention.  Stent removal as previously scheduled.   - Resume CLD --> full liquid diet  - Thoracic surgery is planning on stent removal on 12/2 given recent start of anticoagulation.  Will need enxo held the night before.   -Continue home omeprazole,  sucralfate  -Zofran + compazine prn  - Resume home K     #Complex psychosocial hx  #Depression  #Anxiety  #Self injurious behavior  #Compulsive foreign body ingestion/insertion  Mental health assessment in ED and patient interview confirms no active self harm intent, but is in need of more intensive outpatient psych follow up.  Continue home meds: gabapentin 600mg TID, lurasidone, buspirone, topiramate desvenlafaxine  -1:1 sitter      #Chronic normocytic anemia  Suspect multifactorial, with likely micro bleeds from recurrent ingestion/insertions, iron deficiency, and poor diet.     Diet: Full Liquid Diet  Snacks/Supplements Adult: Boost Breeze; Between Meals    DVT Prophylaxis: Enox  Hazel Catheter: not present  Code Status: Full code    Disposition Plan   Expected discharge: 2 - 3 days, recommended to prior living arrangement once Esophageal stent removed.  Entered: Susan Frost MD 11/30/2019, 4:54 PM     The patient's care was discussed with the Bedside Nurse, Patient and thoracic surgery Team.    Susan Frost MD  Hospitalist Service, [address], [place]  Pager: [phone]  Please see sticky note for cross cover information  ______________________________________________________________________    Interval History   Having some sore throat with eating.  Denies fever/chills, chest pain, shortness of breathing, abd pain.     Data reviewed today: I reviewed all medications, new labs and imaging results over the last 24 hours. I personally reviewed .    Physical Exam   Vital Signs: Temp: 98.2  F (36.8  C) Temp src: Oral BP: 111/79 Pulse: 76   Resp: 18 SpO2: 96 % O2 Device: None (Room air)    Weight: 248 lbs 12.8 oz  General Appearance: NAD  Respiratory: Breathing well on RA, no dyspnea  Cardiovascular: NOT tachycardia  GI: Soft, NTND  Other: Alert and oriented, moving all ext, anxious appeaering

## 2019-11-30 NOTE — PLAN OF CARE
"BP (!) 149/94 (BP Location: Left arm)   Pulse 82   Temp 98.2  F (36.8  C) (Oral)   Resp 18   Ht 1.575 m (5' 2\")   Wt 112.9 kg (248 lb 12.8 oz)   SpO2 97%   BMI 45.51 kg/m        Time: 0627-9403  Status:Nevin Alvarado is a 29 year old female with a history notable for borderline personality disorder, chronic pain, depression, anxiety, suicide attempts, and recurrent oral ingestion and rectal insertion of foreign bodies requiring multiple endoscopic and surgical interventions who presents with two episodes of hemoptysis, abdominal pain, nausea and emesis  Neuro:  A&Ox4  Activity: SBA, uses walker at home.   Pain: c/o throat and upper back pain. Oxycodone 5 mg q 4 hr, home dose, didn't help per pt.   Cardiac: WNL  Respiratory: WNL, RA, LS clear.   GI/: last bm yesterday per pt. Voiding spontaneously without difficult.    Diet: clear liquid diet, Zofran for nausea and unable to swallow jello because cold/pain. Better when liquid and warm per patient report.   Skin: no skin deficit. Refused skin assessment.   LDAs: Port to right chest, accessed yesterday per pt.   Labs/Imaging: CT   New change this shift: c/o pain. Not happy with Morphine discontinued. Wanted to talk to Gold team. Team notified via pager. Per patient reported, GI discussed with her for possible endoscopy. CT reviewed and didn't think scopy is needed at this time, stent in place. Patient very disappointed for cancelling the procedure without discussing with her.   Plan: once observation goal met, tolerated oral intake, possible discharge tomorrow.     "

## 2019-11-30 NOTE — PROVIDER NOTIFICATION
"Provider paged RE: \"Nutrition unable to provide utensils because order for \"suicide precautions\" specifies paper tray and no utensils. If that is discontinued and normal suicide precautions are put in they can change it. Thanks!`\"  "

## 2019-11-30 NOTE — PLAN OF CARE
Observation Goals Prior to Discharge:  -tolerating oral intake to maintain hydration: Yes, but with pain when swallowing    Pt stated she has stabbing pain in her throat, gave 5 mg oxycodone and pt sleeping comfortably. No other complaints, pt just tired.

## 2019-11-30 NOTE — PLAN OF CARE
Pt. is alert and oriented x 4 up indep.to bathroom  Sitter at bed side for suicide precautions.pt is denying  any suicidal ideation.pt .had long nap today ,when awaked c/p throat pain and back ,pain oxycodone and tylenol given per PRN order.LS clear slightly diminished ,BS+,voided adequately. Diet advanced to full liquid diet.VSS will continue to monitor.

## 2019-12-01 LAB
ANION GAP SERPL CALCULATED.3IONS-SCNC: 5 MMOL/L (ref 3–14)
BASOPHILS # BLD AUTO: 0 10E9/L (ref 0–0.2)
BASOPHILS NFR BLD AUTO: 0.5 %
BUN SERPL-MCNC: 3 MG/DL (ref 7–30)
CALCIUM SERPL-MCNC: 8.8 MG/DL (ref 8.5–10.1)
CHLORIDE SERPL-SCNC: 113 MMOL/L (ref 94–109)
CO2 SERPL-SCNC: 25 MMOL/L (ref 20–32)
CREAT SERPL-MCNC: 0.56 MG/DL (ref 0.52–1.04)
DIFFERENTIAL METHOD BLD: ABNORMAL
EOSINOPHIL # BLD AUTO: 0.3 10E9/L (ref 0–0.7)
EOSINOPHIL NFR BLD AUTO: 4.3 %
ERYTHROCYTE [DISTWIDTH] IN BLOOD BY AUTOMATED COUNT: 15.4 % (ref 10–15)
GFR SERPL CREATININE-BSD FRML MDRD: >90 ML/MIN/{1.73_M2}
GLUCOSE SERPL-MCNC: 94 MG/DL (ref 70–99)
HCT VFR BLD AUTO: 31.9 % (ref 35–47)
HGB BLD-MCNC: 10 G/DL (ref 11.7–15.7)
IMM GRANULOCYTES # BLD: 0 10E9/L (ref 0–0.4)
IMM GRANULOCYTES NFR BLD: 0.3 %
LYMPHOCYTES # BLD AUTO: 1.5 10E9/L (ref 0.8–5.3)
LYMPHOCYTES NFR BLD AUTO: 23.3 %
MAGNESIUM SERPL-MCNC: 1.9 MG/DL (ref 1.6–2.3)
MCH RBC QN AUTO: 29.1 PG (ref 26.5–33)
MCHC RBC AUTO-ENTMCNC: 31.3 G/DL (ref 31.5–36.5)
MCV RBC AUTO: 93 FL (ref 78–100)
MONOCYTES # BLD AUTO: 0.5 10E9/L (ref 0–1.3)
MONOCYTES NFR BLD AUTO: 7.9 %
NEUTROPHILS # BLD AUTO: 4 10E9/L (ref 1.6–8.3)
NEUTROPHILS NFR BLD AUTO: 63.7 %
NRBC # BLD AUTO: 0 10*3/UL
NRBC BLD AUTO-RTO: 0 /100
PHOSPHATE SERPL-MCNC: 3.5 MG/DL (ref 2.5–4.5)
PLATELET # BLD AUTO: 252 10E9/L (ref 150–450)
POTASSIUM SERPL-SCNC: 3.4 MMOL/L (ref 3.4–5.3)
RBC # BLD AUTO: 3.44 10E12/L (ref 3.8–5.2)
SODIUM SERPL-SCNC: 143 MMOL/L (ref 133–144)
WBC # BLD AUTO: 6.2 10E9/L (ref 4–11)

## 2019-12-01 PROCEDURE — 25000128 H RX IP 250 OP 636: Performed by: STUDENT IN AN ORGANIZED HEALTH CARE EDUCATION/TRAINING PROGRAM

## 2019-12-01 PROCEDURE — 85025 COMPLETE CBC W/AUTO DIFF WBC: CPT | Performed by: STUDENT IN AN ORGANIZED HEALTH CARE EDUCATION/TRAINING PROGRAM

## 2019-12-01 PROCEDURE — 12000001 ZZH R&B MED SURG/OB UMMC

## 2019-12-01 PROCEDURE — 36592 COLLECT BLOOD FROM PICC: CPT | Performed by: STUDENT IN AN ORGANIZED HEALTH CARE EDUCATION/TRAINING PROGRAM

## 2019-12-01 PROCEDURE — 83735 ASSAY OF MAGNESIUM: CPT | Performed by: STUDENT IN AN ORGANIZED HEALTH CARE EDUCATION/TRAINING PROGRAM

## 2019-12-01 PROCEDURE — 25000132 ZZH RX MED GY IP 250 OP 250 PS 637: Performed by: STUDENT IN AN ORGANIZED HEALTH CARE EDUCATION/TRAINING PROGRAM

## 2019-12-01 PROCEDURE — 99232 SBSQ HOSP IP/OBS MODERATE 35: CPT | Performed by: STUDENT IN AN ORGANIZED HEALTH CARE EDUCATION/TRAINING PROGRAM

## 2019-12-01 PROCEDURE — 25000128 H RX IP 250 OP 636: Performed by: EMERGENCY MEDICINE

## 2019-12-01 PROCEDURE — 84100 ASSAY OF PHOSPHORUS: CPT | Performed by: STUDENT IN AN ORGANIZED HEALTH CARE EDUCATION/TRAINING PROGRAM

## 2019-12-01 PROCEDURE — 80048 BASIC METABOLIC PNL TOTAL CA: CPT | Performed by: STUDENT IN AN ORGANIZED HEALTH CARE EDUCATION/TRAINING PROGRAM

## 2019-12-01 RX ADMIN — OXYCODONE HYDROCHLORIDE 5 MG: 5 TABLET ORAL at 15:16

## 2019-12-01 RX ADMIN — ACETAMINOPHEN 975 MG: 325 SOLUTION ORAL at 17:13

## 2019-12-01 RX ADMIN — POTASSIUM CHLORIDE 20 MEQ: 1.5 POWDER, FOR SOLUTION ORAL at 09:59

## 2019-12-01 RX ADMIN — CALCIUM CARBONATE (ANTACID) CHEW TAB 500 MG 500 MG: 500 CHEW TAB at 10:00

## 2019-12-01 RX ADMIN — CALCIUM CARBONATE (ANTACID) CHEW TAB 500 MG 500 MG: 500 CHEW TAB at 14:24

## 2019-12-01 RX ADMIN — GABAPENTIN 600 MG: 600 TABLET, FILM COATED ORAL at 14:24

## 2019-12-01 RX ADMIN — TOPIRAMATE 100 MG: 50 TABLET ORAL at 19:57

## 2019-12-01 RX ADMIN — SUCRALFATE 1 G: 1 SUSPENSION ORAL at 10:00

## 2019-12-01 RX ADMIN — GABAPENTIN 600 MG: 600 TABLET, FILM COATED ORAL at 19:56

## 2019-12-01 RX ADMIN — ONDANSETRON 4 MG: 4 TABLET, ORALLY DISINTEGRATING ORAL at 22:06

## 2019-12-01 RX ADMIN — CALCIUM CARBONATE (ANTACID) CHEW TAB 500 MG 500 MG: 500 CHEW TAB at 17:13

## 2019-12-01 RX ADMIN — SUCRALFATE 1 G: 1 SUSPENSION ORAL at 17:13

## 2019-12-01 RX ADMIN — Medication 5 ML: at 17:14

## 2019-12-01 RX ADMIN — CLONIDINE HYDROCHLORIDE 0.1 MG: 0.1 TABLET ORAL at 19:56

## 2019-12-01 RX ADMIN — GABAPENTIN 600 MG: 600 TABLET, FILM COATED ORAL at 10:01

## 2019-12-01 RX ADMIN — Medication 5 ML: at 08:13

## 2019-12-01 RX ADMIN — OXYCODONE HYDROCHLORIDE 5 MG: 5 TABLET ORAL at 20:11

## 2019-12-01 RX ADMIN — DESVENLAFAXINE 100 MG: 50 TABLET, FILM COATED, EXTENDED RELEASE ORAL at 10:03

## 2019-12-01 RX ADMIN — LURASIDONE HYDROCHLORIDE 60 MG: 40 TABLET, FILM COATED ORAL at 19:57

## 2019-12-01 RX ADMIN — POTASSIUM CHLORIDE 20 MEQ: 1.5 POWDER, FOR SOLUTION ORAL at 19:56

## 2019-12-01 RX ADMIN — METFORMIN HYDROCHLORIDE 500 MG: 500 TABLET, EXTENDED RELEASE ORAL at 17:13

## 2019-12-01 RX ADMIN — ACETAMINOPHEN 1000 MG: 325 SOLUTION ORAL at 10:31

## 2019-12-01 RX ADMIN — CLONIDINE HYDROCHLORIDE 0.1 MG: 0.1 TABLET ORAL at 12:08

## 2019-12-01 RX ADMIN — OMEPRAZOLE 20 MG: KIT at 10:04

## 2019-12-01 RX ADMIN — OXYCODONE HYDROCHLORIDE 5 MG: 5 TABLET ORAL at 10:30

## 2019-12-01 RX ADMIN — SUCRALFATE 1 G: 1 SUSPENSION ORAL at 14:23

## 2019-12-01 RX ADMIN — MELATONIN 2000 UNITS: at 10:00

## 2019-12-01 RX ADMIN — ENOXAPARIN SODIUM 120 MG: 120 INJECTION SUBCUTANEOUS at 12:04

## 2019-12-01 RX ADMIN — BUSPIRONE HYDROCHLORIDE 10 MG: 10 TABLET ORAL at 19:57

## 2019-12-01 RX ADMIN — SUCRALFATE 1 G: 1 SUSPENSION ORAL at 19:56

## 2019-12-01 RX ADMIN — BUSPIRONE HYDROCHLORIDE 10 MG: 10 TABLET ORAL at 12:05

## 2019-12-01 ASSESSMENT — ACTIVITIES OF DAILY LIVING (ADL)
AMBULATION: 0-->INDEPENDENT
SWALLOWING: 2-->DIFFICULTY SWALLOWING FOODS
TOILETING: 0-->INDEPENDENT
ADLS_ACUITY_SCORE: 12
COGNITION: 0 - NO COGNITION ISSUES REPORTED
ADLS_ACUITY_SCORE: 12
ADLS_ACUITY_SCORE: 9
TRANSFERRING: 0-->INDEPENDENT
RETIRED_COMMUNICATION: 0-->UNDERSTANDS/COMMUNICATES WITHOUT DIFFICULTY
FALL_HISTORY_WITHIN_LAST_SIX_MONTHS: NO
RETIRED_EATING: 0-->INDEPENDENT
DRESS: 0-->INDEPENDENT
ADLS_ACUITY_SCORE: 12
BATHING: 0-->INDEPENDENT
WHICH_OF_THE_ABOVE_FUNCTIONAL_RISKS_HAD_A_RECENT_ONSET_OR_CHANGE?: SWALLOWING

## 2019-12-01 ASSESSMENT — PAIN DESCRIPTION - DESCRIPTORS: DESCRIPTORS: ACHING;DULL

## 2019-12-01 NOTE — PROGRESS NOTES
"0700 - 1530  Reason for admission: PE detected on CT (patient in with increased emesis with hx of swallowing foreign bodies)  Vitals: AVSS  /83 (BP Location: Left arm)   Pulse 84   Temp 98.1  F (36.7  C) (Oral)   Resp 16   Ht 1.575 m (5' 2\")   Wt 112.9 kg (248 lb 12.8 oz)   SpO2 96%   BMI 45.51 kg/m    Activity: Ambulates Independently in room to bathroom  Pain: Headache, chest and low back pain \"Achy, Dull\". Oxy 5 mg and Tylenol 1000 mg liquid given with relief.  Neuro: Alert & Oriented, no deficients noted   Cardiac: HR Regular  Respiratory: LS clear upon ausculation  GI/: Tolerating full liquid diet, voiding spontaneously.   Diet: Full Liquid diet, pt requested to advance (wanting mashed potatoes) Provider paged, no plan to advance diet at this time. Patient is to be NPO after midnight.  Lines: PIV SL  Wounds/Incisions: px site to LLQ of Abdomen with steri-strips from lap appy.  Labs/imaging/Consults:   Discharge Plan: Plan for stent removal on Monday.     Nataliya RN [phone]    [facility] 8 Provider ok's pencil, cell phone and plastic utencils at bedside as long as sitter is present.     Pt is A&O x4, VSS. LS clear, pt did complain of L sided chest pain that felt similar to when she came in. Worse with deep breaths, no SOB. States it started yesterday after eating broth. Reports throat soreness and pain with swallowing, denies any hemoptysis. Prn tylenol and oxycodone given for throat soreness and lower back pain. Denies nausea, had a BM last evening which was normal. Voiding independently, walked in halls and showered last evening.  Sitter at bedside. Will continue to monitor. Patient transferring to .   "

## 2019-12-01 NOTE — PROGRESS NOTES
RN called Re chest pain--stable since pt admitted and not new, asked to assess if reproducible with palpation, suspect is secondary to pneumonia/recent esoph perf    Low threshold for trop/EKG

## 2019-12-01 NOTE — PROGRESS NOTES
[facility], [place]    Medicine Progress Note - Hospitalist Service, Gold 8       Date of Admission:  11/28/2019  Assessment & Plan   Nevin Alvarado is a 29 year old female admitted on 11/28/2019. She has a hx of borderline personality disorder, depression, PTSD, anxiety, multiple intentional and unintentional overdoses, and complex hx of foreign body ingestion/insertion c/b recent esophageal perforation now s/p esophageal stent (10/9/19) who is being admitted for p.o intolerance and hemoptysis w/new diagnosis of PE.     TODAY  - Continue full liquid diet  - Hold tonight's enox dose  - NPO at MN  - Per thoracic surgery, will add on to OR on Monday to have esophageal stent removed  - Continue 1:1 sitter, but ok to have utensils     #  Interlobar/lower lobe emboli in the R pulmonary artery, Provoked  - No chest pain, dyspnea, on RA breathing well, hemoptysis resolved.  No sign of R heart strain on CTA.  Will hold off on further cardiac workup (TTE, trop, BNP).   - In the setting of recent appendectomy and esophageal rupture and stent placement (10/5/2019), appendectomy (11/11/2019) and being sedentary. No personal or FH of hypercoagubility, no miscarriage.  Will call this a provoked, first time PE.  Unusual that she doesn't have DVT, but possibly formed a clot in the leg and that solitary clot went to her lung.   - Has levonorgestrol IUD in place which is ok to continue (Arterioscler Thromb Vasc Biol. 2010;30:8914-7954.)  - Enox continued (hx of N/V, doesn't think she can consistently take PO).      # N/V, sore throat  #Recent esophageal perforation s/p stent   - Given recent esoph perf, GI and thoracic surgery were consulted and recommended no intervention.  Stent removal as previously scheduled.   - Resume CLD --> full liquid diet  - Thoracic surgery is planning on stent removal on 12/2 given recent start of anticoagulation.  Will need enxo held the night before.   -Continue home  omeprazole, sucralfate  -Zofran + compazine prn  - Resume home K     #Complex psychosocial hx  #Depression  #Anxiety  #Self injurious behavior  #Compulsive foreign body ingestion/insertion  Mental health assessment in ED and patient interview confirms no active self harm intent, but is in need of more intensive outpatient psych follow up.  Continue home meds: gabapentin 600mg TID, lurasidone, buspirone, topiramate desvenlafaxine  -1:1 sitter      #Chronic normocytic anemia  Suspect multifactorial, with likely micro bleeds from recurrent ingestion/insertions, iron deficiency, and poor diet.     Diet: Full Liquid Diet  Snacks/Supplements Adult: Boost Breeze; Between Meals  NPO per Anesthesia Guidelines for Procedure/Surgery Except for: Meds    DVT Prophylaxis: Enox  Hazel Catheter: not present  Code Status: Full code    Disposition Plan   Expected discharge: 2 - 3 days, recommended to prior living arrangement once Esophageal stent removed.  Entered: Susan Frost MD 12/01/2019, 4:09 PM     The patient's care was discussed with the Bedside Nurse, Patient and thoracic surgery Team.    Susan Frost MD  Hospitalist Service, [address], [place]  Pager: [phone]  Please see sticky note for cross cover information  ______________________________________________________________________    Interval History   Having some sore throat with eating.  But tolerating full liq diet.  Wants to eat mechanically soft diet.     Denies fever/chills, chest pain, shortness of breathing, abd pain.     Data reviewed today: I reviewed all medications, new labs and imaging results over the last 24 hours. I personally reviewed .    Physical Exam   Vital Signs: Temp: 98.5  F (36.9  C) Temp src: Oral BP: 128/89 Pulse: 86   Resp: 18 SpO2: 96 % O2 Device: None (Room air)    Weight: 248 lbs 12.8 oz  General Appearance: NAD  Respiratory: Breathing well on RA, no dyspnea  Cardiovascular: NOT tachycardia  GI: Soft,  NTND  Other: Alert and oriented, moving all ext, anxious appeaering

## 2019-12-01 NOTE — PROGRESS NOTES
Pt is A&O x4, VSS. LS clear, pt did complain of L sided chest pain that felt similar to when she came in. Worse with deep breaths, no SOB. States it started this afternoon after eating broth. Reports throat soreness and pain with swallowing, denies any hemoptysis. Prn tylenol and oxycodone given for throat soreness and lower back pain. Denies nausea, had a BM this evening which was normal. Voiding independently, walked in halls and showered. Sitter at bedside. Will continue to monitor.

## 2019-12-02 ENCOUNTER — ANESTHESIA EVENT (OUTPATIENT)
Dept: SURGERY | Facility: CLINIC | Age: 29
DRG: 176 | End: 2019-12-02
Payer: COMMERCIAL

## 2019-12-02 ENCOUNTER — APPOINTMENT (OUTPATIENT)
Dept: GENERAL RADIOLOGY | Facility: CLINIC | Age: 29
DRG: 176 | End: 2019-12-02
Attending: THORACIC SURGERY (CARDIOTHORACIC VASCULAR SURGERY)
Payer: COMMERCIAL

## 2019-12-02 ENCOUNTER — ANESTHESIA (OUTPATIENT)
Dept: SURGERY | Facility: CLINIC | Age: 29
DRG: 176 | End: 2019-12-02
Payer: COMMERCIAL

## 2019-12-02 LAB
ANION GAP SERPL CALCULATED.3IONS-SCNC: 5 MMOL/L (ref 3–14)
BASOPHILS # BLD AUTO: 0 10E9/L (ref 0–0.2)
BASOPHILS NFR BLD AUTO: 0.5 %
BUN SERPL-MCNC: 3 MG/DL (ref 7–30)
CALCIUM SERPL-MCNC: 8.8 MG/DL (ref 8.5–10.1)
CHLORIDE SERPL-SCNC: 111 MMOL/L (ref 94–109)
CO2 SERPL-SCNC: 25 MMOL/L (ref 20–32)
CREAT SERPL-MCNC: 0.54 MG/DL (ref 0.52–1.04)
DIFFERENTIAL METHOD BLD: ABNORMAL
EOSINOPHIL # BLD AUTO: 0.3 10E9/L (ref 0–0.7)
EOSINOPHIL NFR BLD AUTO: 4.1 %
ERYTHROCYTE [DISTWIDTH] IN BLOOD BY AUTOMATED COUNT: 15.4 % (ref 10–15)
GFR SERPL CREATININE-BSD FRML MDRD: >90 ML/MIN/{1.73_M2}
GLUCOSE SERPL-MCNC: 100 MG/DL (ref 70–99)
HCG UR QL: NEGATIVE
HCT VFR BLD AUTO: 32.1 % (ref 35–47)
HGB BLD-MCNC: 10 G/DL (ref 11.7–15.7)
IMM GRANULOCYTES # BLD: 0 10E9/L (ref 0–0.4)
IMM GRANULOCYTES NFR BLD: 0.2 %
LYMPHOCYTES # BLD AUTO: 1.4 10E9/L (ref 0.8–5.3)
LYMPHOCYTES NFR BLD AUTO: 21.4 %
MAGNESIUM SERPL-MCNC: 1.9 MG/DL (ref 1.6–2.3)
MCH RBC QN AUTO: 28.5 PG (ref 26.5–33)
MCHC RBC AUTO-ENTMCNC: 31.2 G/DL (ref 31.5–36.5)
MCV RBC AUTO: 92 FL (ref 78–100)
MONOCYTES # BLD AUTO: 0.5 10E9/L (ref 0–1.3)
MONOCYTES NFR BLD AUTO: 6.9 %
NEUTROPHILS # BLD AUTO: 4.4 10E9/L (ref 1.6–8.3)
NEUTROPHILS NFR BLD AUTO: 66.9 %
NRBC # BLD AUTO: 0 10*3/UL
NRBC BLD AUTO-RTO: 0 /100
PLATELET # BLD AUTO: 264 10E9/L (ref 150–450)
POTASSIUM SERPL-SCNC: 3.6 MMOL/L (ref 3.4–5.3)
RBC # BLD AUTO: 3.51 10E12/L (ref 3.8–5.2)
SODIUM SERPL-SCNC: 142 MMOL/L (ref 133–144)
WBC # BLD AUTO: 6.5 10E9/L (ref 4–11)

## 2019-12-02 PROCEDURE — 36415 COLL VENOUS BLD VENIPUNCTURE: CPT | Performed by: STUDENT IN AN ORGANIZED HEALTH CARE EDUCATION/TRAINING PROGRAM

## 2019-12-02 PROCEDURE — 27210794 ZZH OR GENERAL SUPPLY STERILE: Performed by: THORACIC SURGERY (CARDIOTHORACIC VASCULAR SURGERY)

## 2019-12-02 PROCEDURE — 25000128 H RX IP 250 OP 636: Performed by: STUDENT IN AN ORGANIZED HEALTH CARE EDUCATION/TRAINING PROGRAM

## 2019-12-02 PROCEDURE — 71000014 ZZH RECOVERY PHASE 1 LEVEL 2 FIRST HR: Performed by: THORACIC SURGERY (CARDIOTHORACIC VASCULAR SURGERY)

## 2019-12-02 PROCEDURE — 99233 SBSQ HOSP IP/OBS HIGH 50: CPT | Performed by: STUDENT IN AN ORGANIZED HEALTH CARE EDUCATION/TRAINING PROGRAM

## 2019-12-02 PROCEDURE — 85025 COMPLETE CBC W/AUTO DIFF WBC: CPT | Performed by: STUDENT IN AN ORGANIZED HEALTH CARE EDUCATION/TRAINING PROGRAM

## 2019-12-02 PROCEDURE — 80048 BASIC METABOLIC PNL TOTAL CA: CPT | Performed by: STUDENT IN AN ORGANIZED HEALTH CARE EDUCATION/TRAINING PROGRAM

## 2019-12-02 PROCEDURE — C1769 GUIDE WIRE: HCPCS | Performed by: THORACIC SURGERY (CARDIOTHORACIC VASCULAR SURGERY)

## 2019-12-02 PROCEDURE — 25000128 H RX IP 250 OP 636: Performed by: PHYSICIAN ASSISTANT

## 2019-12-02 PROCEDURE — 25800030 ZZH RX IP 258 OP 636: Performed by: NURSE ANESTHETIST, CERTIFIED REGISTERED

## 2019-12-02 PROCEDURE — 25000125 ZZHC RX 250: Performed by: NURSE ANESTHETIST, CERTIFIED REGISTERED

## 2019-12-02 PROCEDURE — 37000008 ZZH ANESTHESIA TECHNICAL FEE, 1ST 30 MIN: Performed by: THORACIC SURGERY (CARDIOTHORACIC VASCULAR SURGERY)

## 2019-12-02 PROCEDURE — 36000053 ZZH SURGERY LEVEL 2 EA 15 ADDTL MIN - UMMC: Performed by: THORACIC SURGERY (CARDIOTHORACIC VASCULAR SURGERY)

## 2019-12-02 PROCEDURE — 25000566 ZZH SEVOFLURANE, EA 15 MIN: Performed by: THORACIC SURGERY (CARDIOTHORACIC VASCULAR SURGERY)

## 2019-12-02 PROCEDURE — 25000128 H RX IP 250 OP 636: Performed by: THORACIC SURGERY (CARDIOTHORACIC VASCULAR SURGERY)

## 2019-12-02 PROCEDURE — 25000132 ZZH RX MED GY IP 250 OP 250 PS 637: Performed by: STUDENT IN AN ORGANIZED HEALTH CARE EDUCATION/TRAINING PROGRAM

## 2019-12-02 PROCEDURE — 40000277 XR SURGERY CARM FLUORO LESS THAN 5 MIN W STILLS: Mod: TC

## 2019-12-02 PROCEDURE — 25000132 ZZH RX MED GY IP 250 OP 250 PS 637: Performed by: ANESTHESIOLOGY

## 2019-12-02 PROCEDURE — 36000051 ZZH SURGERY LEVEL 2 1ST 30 MIN - UMMC: Performed by: THORACIC SURGERY (CARDIOTHORACIC VASCULAR SURGERY)

## 2019-12-02 PROCEDURE — 81025 URINE PREGNANCY TEST: CPT | Performed by: ANESTHESIOLOGY

## 2019-12-02 PROCEDURE — 40000170 ZZH STATISTIC PRE-PROCEDURE ASSESSMENT II: Performed by: THORACIC SURGERY (CARDIOTHORACIC VASCULAR SURGERY)

## 2019-12-02 PROCEDURE — 25800030 ZZH RX IP 258 OP 636: Performed by: STUDENT IN AN ORGANIZED HEALTH CARE EDUCATION/TRAINING PROGRAM

## 2019-12-02 PROCEDURE — 12000001 ZZH R&B MED SURG/OB UMMC

## 2019-12-02 PROCEDURE — 83735 ASSAY OF MAGNESIUM: CPT | Performed by: STUDENT IN AN ORGANIZED HEALTH CARE EDUCATION/TRAINING PROGRAM

## 2019-12-02 PROCEDURE — 0DP58DZ REMOVAL OF INTRALUMINAL DEVICE FROM ESOPHAGUS, VIA NATURAL OR ARTIFICIAL OPENING ENDOSCOPIC: ICD-10-PCS | Performed by: THORACIC SURGERY (CARDIOTHORACIC VASCULAR SURGERY)

## 2019-12-02 PROCEDURE — 25000128 H RX IP 250 OP 636: Performed by: ANESTHESIOLOGY

## 2019-12-02 PROCEDURE — 25000128 H RX IP 250 OP 636: Performed by: NURSE ANESTHETIST, CERTIFIED REGISTERED

## 2019-12-02 PROCEDURE — 37000009 ZZH ANESTHESIA TECHNICAL FEE, EACH ADDTL 15 MIN: Performed by: THORACIC SURGERY (CARDIOTHORACIC VASCULAR SURGERY)

## 2019-12-02 PROCEDURE — 71000015 ZZH RECOVERY PHASE 1 LEVEL 2 EA ADDTL HR: Performed by: THORACIC SURGERY (CARDIOTHORACIC VASCULAR SURGERY)

## 2019-12-02 PROCEDURE — C9113 INJ PANTOPRAZOLE SODIUM, VIA: HCPCS | Performed by: STUDENT IN AN ORGANIZED HEALTH CARE EDUCATION/TRAINING PROGRAM

## 2019-12-02 PROCEDURE — 25000125 ZZHC RX 250: Performed by: ANESTHESIOLOGY

## 2019-12-02 RX ORDER — PROPOFOL 10 MG/ML
INJECTION, EMULSION INTRAVENOUS PRN
Status: DISCONTINUED | OUTPATIENT
Start: 2019-12-02 | End: 2019-12-02

## 2019-12-02 RX ORDER — SCOLOPAMINE TRANSDERMAL SYSTEM 1 MG/1
1 PATCH, EXTENDED RELEASE TRANSDERMAL
Status: DISCONTINUED | OUTPATIENT
Start: 2019-12-02 | End: 2019-12-02 | Stop reason: HOSPADM

## 2019-12-02 RX ORDER — SODIUM CHLORIDE, SODIUM LACTATE, POTASSIUM CHLORIDE, CALCIUM CHLORIDE 600; 310; 30; 20 MG/100ML; MG/100ML; MG/100ML; MG/100ML
INJECTION, SOLUTION INTRAVENOUS CONTINUOUS
Status: DISCONTINUED | OUTPATIENT
Start: 2019-12-02 | End: 2019-12-02 | Stop reason: HOSPADM

## 2019-12-02 RX ORDER — ONDANSETRON 4 MG/1
4 TABLET, ORALLY DISINTEGRATING ORAL EVERY 30 MIN PRN
Status: DISCONTINUED | OUTPATIENT
Start: 2019-12-02 | End: 2019-12-02 | Stop reason: HOSPADM

## 2019-12-02 RX ORDER — HYDROMORPHONE HYDROCHLORIDE 1 MG/ML
.3-.5 INJECTION, SOLUTION INTRAMUSCULAR; INTRAVENOUS; SUBCUTANEOUS EVERY 5 MIN PRN
Status: DISCONTINUED | OUTPATIENT
Start: 2019-12-02 | End: 2019-12-02 | Stop reason: HOSPADM

## 2019-12-02 RX ORDER — IOPAMIDOL 755 MG/ML
INJECTION, SOLUTION INTRAVASCULAR PRN
Status: DISCONTINUED | OUTPATIENT
Start: 2019-12-02 | End: 2019-12-02 | Stop reason: HOSPADM

## 2019-12-02 RX ORDER — HYDROMORPHONE HYDROCHLORIDE 1 MG/ML
.3-.5 INJECTION, SOLUTION INTRAMUSCULAR; INTRAVENOUS; SUBCUTANEOUS
Status: DISCONTINUED | OUTPATIENT
Start: 2019-12-02 | End: 2019-12-02

## 2019-12-02 RX ORDER — HYDROMORPHONE HYDROCHLORIDE 1 MG/ML
.3-.5 INJECTION, SOLUTION INTRAMUSCULAR; INTRAVENOUS; SUBCUTANEOUS EVERY 4 HOURS PRN
Status: DISCONTINUED | OUTPATIENT
Start: 2019-12-02 | End: 2019-12-02

## 2019-12-02 RX ORDER — ONDANSETRON 2 MG/ML
4 INJECTION INTRAMUSCULAR; INTRAVENOUS EVERY 30 MIN PRN
Status: DISCONTINUED | OUTPATIENT
Start: 2019-12-02 | End: 2019-12-02 | Stop reason: HOSPADM

## 2019-12-02 RX ORDER — LIDOCAINE 40 MG/G
CREAM TOPICAL
Status: DISCONTINUED | OUTPATIENT
Start: 2019-12-02 | End: 2019-12-02 | Stop reason: HOSPADM

## 2019-12-02 RX ORDER — HYDROMORPHONE HYDROCHLORIDE 1 MG/ML
.3-.5 INJECTION, SOLUTION INTRAMUSCULAR; INTRAVENOUS; SUBCUTANEOUS
Status: DISPENSED | OUTPATIENT
Start: 2019-12-02 | End: 2019-12-03

## 2019-12-02 RX ORDER — LIDOCAINE HYDROCHLORIDE 20 MG/ML
INJECTION, SOLUTION INFILTRATION; PERINEURAL PRN
Status: DISCONTINUED | OUTPATIENT
Start: 2019-12-02 | End: 2019-12-02

## 2019-12-02 RX ORDER — OXYCODONE HYDROCHLORIDE 5 MG/1
5 TABLET ORAL EVERY 4 HOURS PRN
Status: DISCONTINUED | OUTPATIENT
Start: 2019-12-02 | End: 2019-12-02

## 2019-12-02 RX ORDER — FENTANYL CITRATE 50 UG/ML
INJECTION, SOLUTION INTRAMUSCULAR; INTRAVENOUS PRN
Status: DISCONTINUED | OUTPATIENT
Start: 2019-12-02 | End: 2019-12-02

## 2019-12-02 RX ORDER — SODIUM CHLORIDE, SODIUM LACTATE, POTASSIUM CHLORIDE, CALCIUM CHLORIDE 600; 310; 30; 20 MG/100ML; MG/100ML; MG/100ML; MG/100ML
INJECTION, SOLUTION INTRAVENOUS CONTINUOUS PRN
Status: DISCONTINUED | OUTPATIENT
Start: 2019-12-02 | End: 2019-12-02

## 2019-12-02 RX ORDER — NALOXONE HYDROCHLORIDE 0.4 MG/ML
.1-.4 INJECTION, SOLUTION INTRAMUSCULAR; INTRAVENOUS; SUBCUTANEOUS
Status: ACTIVE | OUTPATIENT
Start: 2019-12-02 | End: 2019-12-03

## 2019-12-02 RX ADMIN — BUSPIRONE HYDROCHLORIDE 10 MG: 10 TABLET ORAL at 08:11

## 2019-12-02 RX ADMIN — PROPOFOL 100 MG: 10 INJECTION, EMULSION INTRAVENOUS at 10:56

## 2019-12-02 RX ADMIN — HYDROMORPHONE HYDROCHLORIDE 0.5 MG: 1 INJECTION, SOLUTION INTRAMUSCULAR; INTRAVENOUS; SUBCUTANEOUS at 12:28

## 2019-12-02 RX ADMIN — GABAPENTIN 600 MG: 600 TABLET, FILM COATED ORAL at 20:43

## 2019-12-02 RX ADMIN — POTASSIUM CHLORIDE 20 MEQ: 1.5 POWDER, FOR SOLUTION ORAL at 20:47

## 2019-12-02 RX ADMIN — ROCURONIUM BROMIDE 50 MG: 10 INJECTION INTRAVENOUS at 10:56

## 2019-12-02 RX ADMIN — MELATONIN 2000 UNITS: at 08:12

## 2019-12-02 RX ADMIN — ACETAMINOPHEN 975 MG: 325 SOLUTION ORAL at 15:46

## 2019-12-02 RX ADMIN — HYDROMORPHONE HYDROCHLORIDE 0.5 MG: 1 INJECTION, SOLUTION INTRAMUSCULAR; INTRAVENOUS; SUBCUTANEOUS at 12:14

## 2019-12-02 RX ADMIN — PANTOPRAZOLE SODIUM 40 MG: 40 INJECTION, POWDER, FOR SOLUTION INTRAVENOUS at 14:44

## 2019-12-02 RX ADMIN — CLONIDINE HYDROCHLORIDE 0.1 MG: 0.1 TABLET ORAL at 20:43

## 2019-12-02 RX ADMIN — LIDOCAINE HYDROCHLORIDE 100 MG: 20 INJECTION, SOLUTION INFILTRATION; PERINEURAL at 10:56

## 2019-12-02 RX ADMIN — FENTANYL CITRATE 50 MCG: 50 INJECTION, SOLUTION INTRAMUSCULAR; INTRAVENOUS at 10:56

## 2019-12-02 RX ADMIN — TOPIRAMATE 100 MG: 50 TABLET ORAL at 20:42

## 2019-12-02 RX ADMIN — CALCIUM CARBONATE (ANTACID) CHEW TAB 500 MG 500 MG: 500 CHEW TAB at 17:17

## 2019-12-02 RX ADMIN — DESVENLAFAXINE 100 MG: 50 TABLET, FILM COATED, EXTENDED RELEASE ORAL at 08:11

## 2019-12-02 RX ADMIN — ONDANSETRON 4 MG: 2 INJECTION INTRAMUSCULAR; INTRAVENOUS at 11:19

## 2019-12-02 RX ADMIN — LURASIDONE HYDROCHLORIDE 60 MG: 40 TABLET, FILM COATED ORAL at 20:41

## 2019-12-02 RX ADMIN — ACETAMINOPHEN 975 MG: 325 SOLUTION ORAL at 22:34

## 2019-12-02 RX ADMIN — PANTOPRAZOLE SODIUM 40 MG: 40 INJECTION, POWDER, FOR SOLUTION INTRAVENOUS at 20:43

## 2019-12-02 RX ADMIN — SODIUM CHLORIDE, POTASSIUM CHLORIDE, SODIUM LACTATE AND CALCIUM CHLORIDE 1000 ML: 600; 310; 30; 20 INJECTION, SOLUTION INTRAVENOUS at 14:44

## 2019-12-02 RX ADMIN — HYDROMORPHONE HYDROCHLORIDE 0.5 MG: 1 INJECTION, SOLUTION INTRAMUSCULAR; INTRAVENOUS; SUBCUTANEOUS at 19:05

## 2019-12-02 RX ADMIN — CALCIUM CARBONATE (ANTACID) CHEW TAB 500 MG 500 MG: 500 CHEW TAB at 08:11

## 2019-12-02 RX ADMIN — SCOPALAMINE 1 PATCH: 1 PATCH, EXTENDED RELEASE TRANSDERMAL at 09:36

## 2019-12-02 RX ADMIN — OMEPRAZOLE 20 MG: KIT at 08:11

## 2019-12-02 RX ADMIN — SUCRALFATE 1 G: 1 SUSPENSION ORAL at 20:42

## 2019-12-02 RX ADMIN — OXYCODONE HYDROCHLORIDE 5 MG: 5 TABLET ORAL at 06:49

## 2019-12-02 RX ADMIN — HYDROMORPHONE HYDROCHLORIDE 0.5 MG: 1 INJECTION, SOLUTION INTRAMUSCULAR; INTRAVENOUS; SUBCUTANEOUS at 14:53

## 2019-12-02 RX ADMIN — FENTANYL CITRATE 50 MCG: 50 INJECTION, SOLUTION INTRAMUSCULAR; INTRAVENOUS at 11:32

## 2019-12-02 RX ADMIN — BUSPIRONE HYDROCHLORIDE 10 MG: 10 TABLET ORAL at 20:43

## 2019-12-02 RX ADMIN — CLONIDINE HYDROCHLORIDE 0.1 MG: 0.1 TABLET ORAL at 08:12

## 2019-12-02 RX ADMIN — ONDANSETRON 4 MG: 2 INJECTION INTRAMUSCULAR; INTRAVENOUS at 11:46

## 2019-12-02 RX ADMIN — OXYCODONE HYDROCHLORIDE 5 MG: 5 TABLET ORAL at 15:46

## 2019-12-02 RX ADMIN — METFORMIN HYDROCHLORIDE 500 MG: 500 TABLET, EXTENDED RELEASE ORAL at 17:17

## 2019-12-02 RX ADMIN — GABAPENTIN 600 MG: 600 TABLET, FILM COATED ORAL at 08:12

## 2019-12-02 RX ADMIN — SUGAMMADEX 200 MG: 100 INJECTION, SOLUTION INTRAVENOUS at 11:22

## 2019-12-02 RX ADMIN — SODIUM CHLORIDE, POTASSIUM CHLORIDE, SODIUM LACTATE AND CALCIUM CHLORIDE: 600; 310; 30; 20 INJECTION, SOLUTION INTRAVENOUS at 10:48

## 2019-12-02 RX ADMIN — GABAPENTIN 600 MG: 600 TABLET, FILM COATED ORAL at 14:44

## 2019-12-02 RX ADMIN — SUCRALFATE 1 G: 1 SUSPENSION ORAL at 08:12

## 2019-12-02 RX ADMIN — SUCRALFATE 1 G: 1 SUSPENSION ORAL at 15:46

## 2019-12-02 ASSESSMENT — ACTIVITIES OF DAILY LIVING (ADL)
ADLS_ACUITY_SCORE: 8
ADLS_ACUITY_SCORE: 12
ADLS_ACUITY_SCORE: 8
ADLS_ACUITY_SCORE: 12
ADLS_ACUITY_SCORE: 9

## 2019-12-02 NOTE — PLAN OF CARE
Status: Pt has a hx of borderline personality disorder, depression, PTSD, anxiety, multiple intentional and unintentional overdoses, and complex hx of foreign body ingestion/insertion c/b recent esophageal perforation now s/p esophageal stent (10/9/19) who is being admitted for PO intolerance and hemoptysis w/new diagnosis of PE.   Vitals: VSS on RA   Neuros: Intact.   IV: Port HL   Resp: WDL   Diet: Full liquid diet. NPO @ 0000.  Bowel status: No BM this shift. BS+   : Voiding spont.   Skin: Intact.   Pain: C/o generalized pain. Oxy given.    Activity: Up ind. 1:1 at all times, to watch for foreign body ingestion.   Social: No visitors present.   Plan: Plan for esophageal stent removal today. Needs shower this am - was refusing but said she would do it in the morning.

## 2019-12-02 NOTE — PLAN OF CARE
Per Mobility Level Guideline;  Bed/chair alarm No.  Patient may ambulate independently  Patient requires the following assistive equipment: None, bedside attendant in place   PT/OT consult orders in place No

## 2019-12-02 NOTE — PLAN OF CARE
Status: Pt has a hx of borderline personality disorder, depression, PTSD, anxiety, multiple intentional and unintentional overdoses, and complex hx of foreign body ingestion/insertion c/b recent esophageal perforation now s/p esophageal stent (10/9/19) who is being admitted for PO intolerance and hemoptysis w/new diagnosis of PE.   Vitals: VSS on RA   Neuros: Intact.   IV: Port HL   Resp: WDL   Diet: Full liquid diet.   Bowel status: BM x1 this shift.   : Voiding spont.   Skin: Intact.   Pain: C/o generalized pain. Oxy given. C/o mild abd pain. Zofran given.   Activity: Up ind. 1:1 at all times, to watch for foreign body ingestion.   Social: No visitors present.   Plan: NPO at MN. Plan for esophageal stent removal tomorrow. Pt refused shower tonight, states she doesn't want one before procedure.

## 2019-12-02 NOTE — OR NURSING
Pt states that she is feeling a little nervous.  Pt distracted with TV.  Pt updated on change of time for procedure.

## 2019-12-02 NOTE — PROGRESS NOTES
"CLINICAL NUTRITION SERVICES - ASSESSMENT NOTE     Nutrition Prescription    RECOMMENDATIONS FOR MDs/PROVIDERS TO ORDER:  None at this time    Malnutrition Status:    Unable to determine at this time    Recommendations already ordered by Registered Dietitian (RD):  None at this time    Future/Additional Recommendations:  Once po diet resumes postop and adv beyond CLs, recommend ordering calorie counts to monitor and assess po progress and need for po vs enteral supplementation d/t ongoing wt loss.     REASON FOR ASSESSMENT  Nevin Alvarado is a/an 29 year old female assessed by the dietitian for Admission Nutrition Risk Screen for unintentional loss of 10# or more in the past two months    NUTRITION HISTORY  Unable to obtain secondary to pt off the floor for OR. Pt known to Nutrition Services from previous hospitalization from 11/3 thru 11/5. Pt received diet education on liquid diet secondary h/o esophageal perforation and stent in place,  Per chart review, pt admitted for evaluation of hemoptysis, N/V and throat and upper back pain. Also reported pt ingested 2 [place], one 2 weeks ago and another 2 days ago.     CURRENT NUTRITION ORDERS  Diet: NPO since 10/2 for OR or esophageal stent removal.    LABS  BUN 3 (low), yesterday and today; suggestive of poor protein intakes    MEDICATIONS  Medications reviewed    ANTHROPOMETRICS  Height: 157.5 cm (5' 2\")  Most Recent Weight: 112.9 kg (248 lb 12.8 oz) - 11/29, Admit wt = 111.1 kg (245 lbs) on 11/28 (lowest wt this admission)  - Previous admit wt = 113.4 kg (250 lbs) on 11/3   IBW: 50 kg  BMI: Obesity Grade III BMI >40  Weight History: Wt loss of 5 lbs (2% loss) x > 3 weeks.     Dosing Weight: 65 kg (adjusted based on lowest wt of 111.1 kg and IBW)    ASSESSED NUTRITION NEEDS  Estimated Energy Needs: 5176-5361 kcals/day (20 - 25 kcals/kg)  Justification: Obese  Estimated Protein Needs:78-98 grams protein/day (1.2 - 1.5 grams of pro/kg)  Justification: " Post-op  Estimated Fluid Needs: (1 mL/kcal)   Justification: Maintenance    PHYSICAL FINDINGS  See malnutrition section below.  Unable to assess at this time d/t pt in OR    MALNUTRITION  % Intake: Unable to assess  % Weight Loss: Weight loss does not meet criteria  Subcutaneous Fat Loss: Unable to assess  Muscle Loss: Unable to assess  Fluid Accumulation/Edema: Does not meet criteria per chart review  Malnutrition Diagnosis: Unable to determine due to unable to obtain diet hx and unable to complete nutritional physical assessment.    NUTRITION DIAGNOSIS  Unintended weight loss related to suspect inadequate nutritional intakes secondary to h/o esophageal perforation with recent N/V and throat pain as evidenced by Wt loss of 5 lbs (2% loss) x > 3 weeks.       INTERVENTIONS  Implementation  Nutrition Education: No education needs assessed at this time     Goals  1. Diet adv within 24 to 48 hrs   2. Patient to consume % of nutritionally adequate meal trays TID, or the equivalent with supplements/snacks over next 5-7 days.    Monitoring/Evaluation  Progress toward goals will be monitored and evaluated per protocol.  Valencia Hubbard RD,LD  Unit pager [phone]

## 2019-12-02 NOTE — PLAN OF CARE
Status: Pt has a hx of borderline personality disorder, depression, PTSD, anxiety, multiple intentional and unintentional overdoses, and complex hx of foreign body ingestion/insertion c/b recent esophageal perforation now s/p esophageal stent (10/9/19) who is being admitted for PO intolerance and hemoptysis w/new diagnosis of PE. Procedure for stent removal completed 12/2/19  Vitals: VSS on RA   Neuros: Intact.   IV: Port running LR @ 100 ml/hr  Resp: WDL   Diet: NPO, ex meds. Ok to start clear liquids tomorrow per MD note.   Bowel status: No BM this shift. BS+. Last BM 12/01/19  : Voiding spont.   Skin: Intact.    Pain: C/o throat pain. See MAR for pain medications received in PACU Given dilaudid x1 @ 1500  Activity: Up independent. 1:1 at all times to watch for foreign body ingestion.   Social: No visitors present.   Plan: Stent removed today, continue to control pain and follow POC

## 2019-12-02 NOTE — PROGRESS NOTES
"PACU CARE:  Assigned as pt nurse in recovery   In pt chart accessing medical records in order to gain knowledge of pt health condition in order to provide pt with safe nursing care during recovery period and adequately document such reflected in my chart.     [facility] hospital attendant at pt bedside Savi     @ [phone] Dr. Mikala avalost paged the following:  \"Nevin Alvarado in PACU needs a transfer order placed so that she can go back to [facility] please and thank you! Ace RN *[phone]\"  "

## 2019-12-02 NOTE — PROGRESS NOTES
[facility], [facility] Progress Note - Hospitalist Service, Gold 8       Date of Admission:  11/28/2019  Assessment & Plan   Nevin Alvarado is a 29 year old female admitted on 11/28/2019. She has a hx of borderline personality disorder, depression, PTSD, anxiety, multiple intentional and unintentional overdoses, and complex hx of foreign body ingestion/insertion c/b recent esophageal perforation now s/p esophageal stent (10/9/19) who is being admitted for p.o intolerance and hemoptysis w/new diagnosis of PE.     TODAY  - Esophageal stent removal.  - NPO today, start CLD tomorrow.  - IVFs while NPO  - Psychology consult for compulsive ingestion.  Patient doesn't want to see psychiatry.   - Hold enox until tomorrow morning  - Continue 1:1 sitter     #  Interlobar/lower lobe emboli in the R pulmonary artery, Provoked  - No chest pain, dyspnea, on RA breathing well, hemoptysis resolved.  No sign of R heart strain on CTA.  Will hold off on further cardiac workup (TTE, trop, BNP).   - In the setting of recent appendectomy and esophageal rupture and stent placement (10/5/2019), appendectomy (11/11/2019) and being sedentary. No personal or FH of hypercoagubility, no miscarriage.  Will call this a provoked, first time PE.  Unusual that she doesn't have DVT, but possibly formed a clot in the leg and that solitary clot went to her lung.   - Has levonorgestrol IUD in place which is ok to continue (Arterioscler Thromb Vasc Biol. 2010;30:3506-3401.)  - Will plan on discharging on enox given hx of N/V, doesn't think she can consistently take PO.   Hold anticoagulation until 12/3/2019 AM.      # N/V, sore throat, chronic  # Recent esophageal perforation with stent, s/p stent removal on 12/2/2019  - Continue home omeprazole, sucralfate  -Zofran + compazine prn  - Per thoracic: NPO 12/2/2019, OK to start clear liquids from 12/3/2019. May advance to full liquid diet.  Week 1 full liquid  diet. Week 2 soft mechanical diet. Week 3 regular diet     #Complex psychosocial hx  #Depression  #Anxiety  #Self injurious behavior  #Compulsive foreign body ingestion/insertion  Mental health assessment in ED and patient interview confirms no active self harm intent, but is in need of more intensive outpatient psych follow up.  Continue home meds: gabapentin 600mg TID, lurasidone, buspirone, topiramate desvenlafaxine  -1:1 sitter   - Per patient's request, will consult psychology for therapy.      #Chronic normocytic anemia  Suspect multifactorial, with likely micro bleeds from recurrent ingestion/insertions, iron deficiency, and poor diet.     Diet: NPO for Medical/Clinical Reasons Except for: Meds    DVT Prophylaxis: Enox  Hazel Catheter: not present  Code Status: Full code    Disposition Plan   Expected discharge: 2 - 3 days, recommended to prior living arrangement once tolerating PO.  Entered: Susan Frost MD 12/02/2019, 5:33 PM     The patient's care was discussed with the Bedside Nurse, Patient and thoracic surgery Team.    Susan Frost MD  Hospitalist Service, [address], [place]  Pager: [phone]  Please see sticky note for cross cover information  ______________________________________________________________________    Interval History   Seen after esophageal stent removal.  Doing well.    Denies fever/chills, chest pain, shortness of breathing, abd pain.     Data reviewed today: I reviewed all medications, new labs and imaging results over the last 24 hours. I personally reviewed .    Physical Exam   Vital Signs: Temp: 97.3  F (36.3  C) Temp src: Oral BP: 130/73 Pulse: 85 Heart Rate: 84 Resp: 16 SpO2: 93 % O2 Device: None (Room air) Oxygen Delivery: 2 LPM  Weight: 248 lbs 12.8 oz  General Appearance: NAD  Respiratory: Breathing well on RA, no dyspnea  Cardiovascular: NOT tachycardia  GI: Soft, NTND  Other: Alert and oriented, moving all ext, anxious appeaering

## 2019-12-02 NOTE — BRIEF OP NOTE
[facility], [place]    Brief Operative Note    Pre-operative diagnosis: Esophageal perforation  Post-operative diagnosis Same as pre-operative diagnosis    Procedure: Procedure(s):  Esophagogastroduodenoscopy with esophageal stent removal  Surgeon: Surgeon(s) and Role:     * Kailee Tena MD - Primary     * Brent Knight MD - Resident - Assisting     * Margie Razo MD - Fellow - Assisting  Anesthesia: General   Estimated blood loss: None  Drains: None  Specimens: * No specimens in log *  Findings:   None.  Complications: None.  Implants: * No implants in log *    - NPO today  - OK to start clear liquids tomorrow. May advance to full liquid diet.  Week 1 full liquid diet  Week 2 soft mechanical diet  Week 3 regular diet  - Hold anticoagulation until tomorrow AM    Brent Knight MD  PGY-3 Surgery  [phone]

## 2019-12-02 NOTE — OR NURSING
Warm liquids helped relieve pain prior to today.  Oxycodone only has helped relieve pain a little per pt.

## 2019-12-02 NOTE — OR NURSING
Urine HCG sent to lab. Bedside attendant present, Josh.  To be with pt post op.  6A needs to be called for Attendant.

## 2019-12-02 NOTE — ANESTHESIA PREPROCEDURE EVALUATION
Anesthesia Pre-Procedure Evaluation    Patient: Nevin Alvarado   MRN:     [id number] Gender:   female   Age:    28 year old :      1990        Preoperative Diagnosis: Esophageal rupture [K22.3]   Procedure(s):  ESOPHAGOGASTRODUODENOSCOPY (EGD), Suture Esophageal Stent     Past Medical History:   Diagnosis Date     ADD (attention deficit disorder)      Anorexia nervosa with bulimia     history of; on Topamax     Anxiety      Borderline personality disorder (H)      Depression      Depressive disorder      H/O self-harm      Lives in independent group home     due to debilitating mental illness     Migraine without aura     no known triggers; on Topamax bid and Imitrex PRN     Morbid obesity (H)      PTSD (post-traumatic stress disorder)      Rectal foreign body - Recurrent issue, self placed      Swallowed foreign body - Recurrent issue, self placed       Past Surgical History:   Procedure Laterality Date     COMBINED ESOPHAGOSCOPY, GASTROSCOPY, DUODENOSCOPY (EGD), REPLACE ESOPHAGEAL STENT N/A 10/9/2019    Procedure: Upper Endoscopy with Suture Placement;  Surgeon: Zurdo Ramirez MD;  [facility]: [facility]     ESOPHAGOSCOPY, GASTROSCOPY, DUODENOSCOPY (EGD), COMBINED N/A 3/9/2017    Procedure: COMBINED ESOPHAGOSCOPY, GASTROSCOPY, DUODENOSCOPY (EGD), REMOVE FOREIGN BODY;  Surgeon: Avis Guzmán MD;  [facility]     ESOPHAGOSCOPY, GASTROSCOPY, DUODENOSCOPY (EGD), COMBINED N/A 2017    Procedure: COMBINED ESOPHAGOSCOPY, GASTROSCOPY, DUODENOSCOPY (EGD), REMOVE FOREIGN BODY;  EGD removal Foregin body;  Surgeon: Lokesh Paula MD;  [facility]     ESOPHAGOSCOPY, GASTROSCOPY, DUODENOSCOPY (EGD), COMBINED N/A 2017    Procedure: COMBINED ESOPHAGOSCOPY, GASTROSCOPY, DUODENOSCOPY (EGD);  COMBINED ESOPHAGOSCOPY, GASTROSCOPY, DUODENOSCOPY (EGD) [[id number]]attempted removal of foreign body;  Surgeon: Pamela Perez MD;  [facility]     ESOPHAGOSCOPY,  GASTROSCOPY, DUODENOSCOPY (EGD), COMBINED N/A 6/9/2017    Procedure: COMBINED ESOPHAGOSCOPY, GASTROSCOPY, DUODENOSCOPY (EGD), REMOVE FOREIGN BODY;  Esophagoscopy, Gastroscopy, Duodenoscopy, Removal of Foreign Body;  Surgeon: Dejon Marsh MD;  [facility]     ESOPHAGOSCOPY, GASTROSCOPY, DUODENOSCOPY (EGD), COMBINED N/A 1/6/2018    Procedure: COMBINED ESOPHAGOSCOPY, GASTROSCOPY, DUODENOSCOPY (EGD), REMOVE FOREIGN BODY;  COMBINED ESOPHAGOSCOPY, GASTROSCOPY, DUODENOSCOPY (EGD) [by pascal net and snare with profol sedation;  Surgeon: Feliciano Emmanuel MD;  Location:  GI     ESOPHAGOSCOPY, GASTROSCOPY, DUODENOSCOPY (EGD), COMBINED N/A 3/19/2018    Procedure: COMBINED ESOPHAGOSCOPY, GASTROSCOPY, DUODENOSCOPY (EGD), REMOVE FOREIGN BODY;   Esophagodscopy, Gastroscopy, Duodenoscopy,Foreign Body Removal;  Surgeon: Brice Guzmán MD;  [facility]     ESOPHAGOSCOPY, GASTROSCOPY, DUODENOSCOPY (EGD), COMBINED N/A 4/16/2018    Procedure: COMBINED ESOPHAGOSCOPY, GASTROSCOPY, DUODENOSCOPY (EGD), REMOVE FOREIGN BODY;  Esophagogastroduodenoscopy  Foreign Body Removal X 2;  Surgeon: Royer Moise MD;  [facility]     ESOPHAGOSCOPY, GASTROSCOPY, DUODENOSCOPY (EGD), COMBINED N/A 6/1/2018    Procedure: COMBINED ESOPHAGOSCOPY, GASTROSCOPY, DUODENOSCOPY (EGD), REMOVE FOREIGN BODY;  COMBINED ESOPHAGOSCOPY, GASTROSCOPY, DUODENOSCOPY with removal of foreign body, propofol sedation by anesthesia;  Surgeon: Brice Martinez MD;  Location:  GI     ESOPHAGOSCOPY, GASTROSCOPY, DUODENOSCOPY (EGD), COMBINED N/A 7/25/2018    Procedure: COMBINED ESOPHAGOSCOPY, GASTROSCOPY, DUODENOSCOPY (EGD), REMOVE FOREIGN BODY;;  Surgeon: Candy Castelan MD;  Location:  GI     ESOPHAGOSCOPY, GASTROSCOPY, DUODENOSCOPY (EGD), COMBINED N/A 7/28/2018    Procedure: COMBINED ESOPHAGOSCOPY, GASTROSCOPY, DUODENOSCOPY (EGD), REMOVE FOREIGN BODY;  COMBINED ESOPHAGOSCOPY, GASTROSCOPY, DUODENOSCOPY (EGD), REMOVE FOREIGN  BODY;  Surgeon: Brice Guzmán MD;  [facility]     ESOPHAGOSCOPY, GASTROSCOPY, DUODENOSCOPY (EGD), COMBINED N/A 7/31/2018    Procedure: COMBINED ESOPHAGOSCOPY, GASTROSCOPY, DUODENOSCOPY (EGD);  COMBINED ESOPHAGOSCOPY, GASTROSCOPY, DUODENOSCOPY (EGD) TO REMOVE FOREIGN BODY;  Surgeon: Lokesh Paula MD;  [facility]     ESOPHAGOSCOPY, GASTROSCOPY, DUODENOSCOPY (EGD), COMBINED N/A 8/4/2018    Procedure: COMBINED ESOPHAGOSCOPY, GASTROSCOPY, DUODENOSCOPY (EGD), REMOVE FOREIGN BODY;   combined esophagoscopy, gastroscopy, duodenoscopy, REMOVE FOREIGN BODY ;  Surgeon: Lokesh Paula MD;  [facility]     ESOPHAGOSCOPY, GASTROSCOPY, DUODENOSCOPY (EGD), COMBINED N/A 10/6/2019    Procedure: ESOPHAGOGASTRODUODENOSCOPY (EGD) with fireign body removal x2, esophageal stent placement with floroscopy;  Surgeon: Timoteo Espana MD;  [facility]     EXAM UNDER ANESTHESIA ANUS N/A 1/10/2017    Procedure: EXAM UNDER ANESTHESIA ANUS;  Surgeon: Annmarie Haynes MD;  [facility]     EXAM UNDER ANESTHESIA RECTUM N/A 7/19/2018    Procedure: EXAM UNDER ANESTHESIA RECTUM;  EXAM UNDER ANESTHESIA, REMOVAL OF RECTAL FOREIGN BODY;  Surgeon: Annmarie Haynes MD;  [facility]     HC REMOVE FECAL IMPACTION OR FB W ANESTHESIA N/A 12/18/2016    Procedure: REMOVE FECAL IMPACTION/FOREIGN BODY UNDER ANESTHESIA;  Surgeon: Soham Cano MD;  Location: [facility] OR     KNEE SURGERY - removed a small tissue mass from knee Right      LAPAROSCOPIC ABLATION ENDOMETRIOSIS       LAPAROTOMY EXPLORATORY N/A 1/10/2017    Procedure: LAPAROTOMY EXPLORATORY;  Surgeon: Annmarie Haynes MD;  [facility]     LAPAROTOMY EXPLORATORY  09/11/2019    Manual manipulation of bowel to remove pill bottle in rectum     lymph nodes removed from neck; benign  age 7     MAMMOPLASTY REDUCTION Bilateral      RELEASE CARPAL TUNNEL Bilateral      SIGMOIDOSCOPY FLEXIBLE N/A 1/10/2017    Procedure: SIGMOIDOSCOPY FLEXIBLE;   Surgeon: Annmarie Haynes MD;  [facility]     SIGMOIDOSCOPY FLEXIBLE N/A 5/8/2018    Procedure: SIGMOIDOSCOPY FLEXIBLE;  flex sig with foreign body removal using snare and rattooth forcep;  Surgeon: Soham Cano MD;  Location: [facility]     SIGMOIDOSCOPY FLEXIBLE N/A 2/20/2019    Procedure: Exam under anesthesia Colonoscopy with attempted  removal of rectal foreign body;  Surgeon: Estrada Chávez MD;  [facility]          Anesthesia Evaluation     . Pt has had prior anesthetic. Type: General and MAC    No history of anesthetic complications          ROS/MED HX    ENT/Pulmonary:  - neg pulmonary ROS   (+)ZOHRA risk factors obese, , . .    Neurologic:  - neg neurologic ROS     Cardiovascular:     (+) ----. : . . fainting (syncope). :. .       METS/Exercise Tolerance:  >4 METS   Hematologic: Comments: Anticoagulation held >12h - neg hematologic  ROS   (+) History of blood clots pt is anticoagulated, -      Musculoskeletal:  - neg musculoskeletal ROS       GI/Hepatic: Comment: S/p multiple foreign body ingestions resulting in esophogeal perforation, stenting 10/9/2019, stricture, pain           Renal/Genitourinary:  - ROS Renal section negative       Endo:  - neg endo ROS   (+) Obesity, .      Psychiatric: Comment: PTSD, Intermittent suicidal ideation      (+) psychiatric history anxiety, depression and bipolar      Infectious Disease:  - neg infectious disease ROS       Malignancy:      - no malignancy   Other:    (+) No chance of pregnancy C-spine cleared: N/A, no H/O Chronic Pain,no other significant disability   - neg other ROS                     PHYSICAL EXAM:   Mental Status/Neuro: A/A/O   Airway:   Mallampati: II  Mouth/Opening: Full  TM distance: > 6 cm  Neck ROM: Full   Respiratory: Auscultation: CTAB     Resp. Rate: Normal     Resp. Effort: Normal      CV: Rhythm: Regular  Rate: Age appropriate  Heart: Normal Sounds  Edema: None   Comments:      Dental: Normal Dentition         "        LABS:  CBC:   Lab Results   Component Value Date    WBC 6.5 12/02/2019    WBC 6.2 12/01/2019    HGB 10.0 (L) 12/02/2019    HGB 10.0 (L) 12/01/2019    HCT 32.1 (L) 12/02/2019    HCT 31.9 (L) 12/01/2019     12/02/2019     12/01/2019     BMP:   Lab Results   Component Value Date     12/02/2019     12/01/2019    POTASSIUM 3.6 12/02/2019    POTASSIUM 3.4 12/01/2019    CHLORIDE 111 (H) 12/02/2019    CHLORIDE 113 (H) 12/01/2019    CO2 25 12/02/2019    CO2 25 12/01/2019    BUN 3 (L) 12/02/2019    BUN 3 (L) 12/01/2019    CR 0.54 12/02/2019    CR 0.56 12/01/2019     (H) 12/02/2019    GLC 94 12/01/2019     COAGS:   Lab Results   Component Value Date    PTT 31 10/06/2019    INR 1.10 11/28/2019     POC:   Lab Results   Component Value Date    BGM 99 11/03/2019    HCG Negative 10/09/2019    HCGS Negative 11/28/2019     OTHER:   Lab Results   Component Value Date    LACT 1.0 11/28/2019    KELBY 8.8 12/02/2019    PHOS 3.5 12/01/2019    MAG 1.9 12/02/2019    ALBUMIN 3.5 11/28/2019    PROTTOTAL 7.1 11/28/2019    ALT 28 11/28/2019    AST 12 11/28/2019    ALKPHOS 75 11/28/2019    BILITOTAL 0.3 11/28/2019    LIPASE 123 11/19/2019    TSH 0.66 03/21/2018    T4 0.96 05/16/2017    CRP 15.7 (H) 10/19/2014    SED 26 (H) 07/11/2017        Preop Vitals    BP Readings from Last 3 Encounters:   12/02/19 126/75   11/19/19 (!) 137/91   11/08/19 119/84    Pulse Readings from Last 3 Encounters:   12/02/19 81   11/19/19 89   11/08/19 82      Resp Readings from Last 3 Encounters:   12/02/19 18   11/19/19 14   11/08/19 18    SpO2 Readings from Last 3 Encounters:   12/02/19 93%   11/19/19 98%   11/08/19 99%      Temp Readings from Last 1 Encounters:   12/02/19 36.8  C (98.2  F) (Oral)    Ht Readings from Last 1 Encounters:   11/28/19 1.575 m (5' 2\")      Wt Readings from Last 1 Encounters:   11/29/19 112.9 kg (248 lb 12.8 oz)    Estimated body mass index is 45.51 kg/m  as calculated from the following:    Height as " "of this encounter: 1.575 m (5' 2\").    Weight as of this encounter: 112.9 kg (248 lb 12.8 oz).     LDA:  Port A Cath Single 06/26/19 Right Chest wall (Active)   Site Assessment WDL 12/2/2019  2:00 AM   Line Status Heparin locked 12/2/2019  2:00 AM   Extravasation? No 12/2/2019  2:00 AM   Dressing Intervention Transparent 12/2/2019  2:00 AM   Number of days: 159        Assessment:   ASA SCORE: 3    H&P: History and physical reviewed and following examination; no interval change.   Smoking Status:  Non-Smoker/Unknown   NPO Status: NPO Appropriate     Plan:   Anes. Type:  General   Pre-Medication: Midazolam   Induction:  IV (Standard)   Airway: ETT; Oral   Access/Monitoring: Central Access/Port present   Maintenance: Balanced     Postop Plan:   Postop Pain: Opioids  Postop Sedation/Airway: Not planned  Disposition: Inpatient/Admit     PONV Management:   Adult Risk Factors: Female, Non-Smoker, Postop Opioids   Prevention: Ondansetron, Scopolamine     CONSENT: Direct conversation   Plan and risks discussed with: Patient   Blood Products: Consent Deferred (Minimal Blood Loss)       Comments for Plan/Consent:                        "

## 2019-12-02 NOTE — ANESTHESIA POSTPROCEDURE EVALUATION
Anesthesia POST Procedure Evaluation    Patient: Nevin Alvarado   MRN:     [id number] Gender:   female   Age:    29 year old :      1990        Preoperative Diagnosis: Esophageal stricture [K22.2]   Procedure(s):  Esophagogastroduodenoscopy with esophageal stent removal, esophogram   Postop Comments: No value filed.       Anesthesia Type:  Not documented  General    Reportable Event: NO     PAIN: Uncomplicated   Sign Out status: Comfortable, Well controlled pain     PONV: No PONV   Sign Out status:  No Nausea or Vomiting     Neuro/Psych: Uneventful perioperative course   Sign Out Status: Preoperative baseline; Age appropriate mentation     Airway/Resp.: Uneventful perioperative course   Sign Out Status: Non labored breathing, age appropriate RR; Resp. Status within EXPECTED Parameters     CV: Uneventful perioperative course   Sign Out status: Appropriate BP and perfusion indices; Appropriate HR/Rhythm     Disposition:   Sign Out in:  PACU  Disposition:  Floor  Recovery Course: Uneventful  Follow-Up: Not required           Last Anesthesia Record Vitals:  CRNA VITALS  2019 1100 - 2019 1200      2019             NIBP:  144/81    Ht Rate:  81    SpO2:  99 %          Last PACU Vitals:  Vitals Value Taken Time   /82 2019 12:00 PM   Temp 37.1  C (98.8  F) 2019 11:40 AM   Pulse 88 2019 12:00 PM   Resp 13 2019 11:45 AM   SpO2 100 % 2019 12:07 PM   Temp src     NIBP 144/81 2019 11:34 AM   Pulse     SpO2 99 % 2019 11:34 AM   Resp     Temp     Ht Rate 81 2019 11:34 AM   Temp 2     Vitals shown include unvalidated device data.      Electronically Signed By: Sarah Wachter, MD, 2019, 12:08 PM

## 2019-12-03 LAB
ANION GAP SERPL CALCULATED.3IONS-SCNC: 3 MMOL/L (ref 3–14)
BASOPHILS # BLD AUTO: 0 10E9/L (ref 0–0.2)
BASOPHILS NFR BLD AUTO: 0.5 %
BUN SERPL-MCNC: 4 MG/DL (ref 7–30)
CALCIUM SERPL-MCNC: 8.6 MG/DL (ref 8.5–10.1)
CHLORIDE SERPL-SCNC: 112 MMOL/L (ref 94–109)
CO2 SERPL-SCNC: 27 MMOL/L (ref 20–32)
CREAT SERPL-MCNC: 0.6 MG/DL (ref 0.52–1.04)
DIFFERENTIAL METHOD BLD: ABNORMAL
EOSINOPHIL # BLD AUTO: 0.2 10E9/L (ref 0–0.7)
EOSINOPHIL NFR BLD AUTO: 5.3 %
ERYTHROCYTE [DISTWIDTH] IN BLOOD BY AUTOMATED COUNT: 15.5 % (ref 10–15)
GFR SERPL CREATININE-BSD FRML MDRD: >90 ML/MIN/{1.73_M2}
GLUCOSE SERPL-MCNC: 105 MG/DL (ref 70–99)
HCT VFR BLD AUTO: 32.2 % (ref 35–47)
HGB BLD-MCNC: 9.9 G/DL (ref 11.7–15.7)
IMM GRANULOCYTES # BLD: 0 10E9/L (ref 0–0.4)
IMM GRANULOCYTES NFR BLD: 0.3 %
LYMPHOCYTES # BLD AUTO: 1.3 10E9/L (ref 0.8–5.3)
LYMPHOCYTES NFR BLD AUTO: 34.3 %
MAGNESIUM SERPL-MCNC: 2 MG/DL (ref 1.6–2.3)
MCH RBC QN AUTO: 28.7 PG (ref 26.5–33)
MCHC RBC AUTO-ENTMCNC: 30.7 G/DL (ref 31.5–36.5)
MCV RBC AUTO: 93 FL (ref 78–100)
MONOCYTES # BLD AUTO: 0.4 10E9/L (ref 0–1.3)
MONOCYTES NFR BLD AUTO: 10.1 %
NEUTROPHILS # BLD AUTO: 1.9 10E9/L (ref 1.6–8.3)
NEUTROPHILS NFR BLD AUTO: 49.5 %
NRBC # BLD AUTO: 0 10*3/UL
NRBC BLD AUTO-RTO: 0 /100
PLATELET # BLD AUTO: 251 10E9/L (ref 150–450)
POTASSIUM SERPL-SCNC: 3.6 MMOL/L (ref 3.4–5.3)
RBC # BLD AUTO: 3.45 10E12/L (ref 3.8–5.2)
SODIUM SERPL-SCNC: 142 MMOL/L (ref 133–144)
WBC # BLD AUTO: 3.8 10E9/L (ref 4–11)

## 2019-12-03 PROCEDURE — 25000128 H RX IP 250 OP 636: Performed by: STUDENT IN AN ORGANIZED HEALTH CARE EDUCATION/TRAINING PROGRAM

## 2019-12-03 PROCEDURE — 25000132 ZZH RX MED GY IP 250 OP 250 PS 637: Performed by: STUDENT IN AN ORGANIZED HEALTH CARE EDUCATION/TRAINING PROGRAM

## 2019-12-03 PROCEDURE — 80048 BASIC METABOLIC PNL TOTAL CA: CPT | Performed by: STUDENT IN AN ORGANIZED HEALTH CARE EDUCATION/TRAINING PROGRAM

## 2019-12-03 PROCEDURE — 85025 COMPLETE CBC W/AUTO DIFF WBC: CPT | Performed by: STUDENT IN AN ORGANIZED HEALTH CARE EDUCATION/TRAINING PROGRAM

## 2019-12-03 PROCEDURE — 83735 ASSAY OF MAGNESIUM: CPT | Performed by: STUDENT IN AN ORGANIZED HEALTH CARE EDUCATION/TRAINING PROGRAM

## 2019-12-03 PROCEDURE — 36592 COLLECT BLOOD FROM PICC: CPT | Performed by: STUDENT IN AN ORGANIZED HEALTH CARE EDUCATION/TRAINING PROGRAM

## 2019-12-03 PROCEDURE — 25000128 H RX IP 250 OP 636: Performed by: INTERNAL MEDICINE

## 2019-12-03 PROCEDURE — 99233 SBSQ HOSP IP/OBS HIGH 50: CPT | Performed by: INTERNAL MEDICINE

## 2019-12-03 PROCEDURE — 12000001 ZZH R&B MED SURG/OB UMMC

## 2019-12-03 PROCEDURE — 25000128 H RX IP 250 OP 636: Performed by: HOSPITALIST

## 2019-12-03 PROCEDURE — 25000132 ZZH RX MED GY IP 250 OP 250 PS 637: Performed by: INTERNAL MEDICINE

## 2019-12-03 PROCEDURE — 25000128 H RX IP 250 OP 636: Performed by: EMERGENCY MEDICINE

## 2019-12-03 PROCEDURE — C9113 INJ PANTOPRAZOLE SODIUM, VIA: HCPCS | Performed by: STUDENT IN AN ORGANIZED HEALTH CARE EDUCATION/TRAINING PROGRAM

## 2019-12-03 RX ORDER — ONDANSETRON 4 MG/1
4 TABLET, ORALLY DISINTEGRATING ORAL EVERY 6 HOURS PRN
Qty: 10 TABLET | Refills: 0 | Status: SHIPPED | OUTPATIENT
Start: 2019-12-03 | End: 2019-12-14

## 2019-12-03 RX ORDER — POTASSIUM CHLORIDE 29.8 MG/ML
20 INJECTION INTRAVENOUS 2 TIMES DAILY
Status: DISCONTINUED | OUTPATIENT
Start: 2019-12-03 | End: 2019-12-05 | Stop reason: HOSPADM

## 2019-12-03 RX ORDER — OXYCODONE HYDROCHLORIDE 5 MG/1
5-10 TABLET ORAL EVERY 6 HOURS PRN
Status: DISCONTINUED | OUTPATIENT
Start: 2019-12-03 | End: 2019-12-05 | Stop reason: HOSPADM

## 2019-12-03 RX ORDER — POTASSIUM CHLORIDE 1.5 G/1.58G
20 POWDER, FOR SOLUTION ORAL 2 TIMES DAILY
Qty: 30 PACKET | Refills: 0 | Status: SHIPPED | OUTPATIENT
Start: 2019-12-03 | End: 2019-12-14

## 2019-12-03 RX ORDER — HEPARIN SODIUM (PORCINE) LOCK FLUSH IV SOLN 100 UNIT/ML 100 UNIT/ML
5 SOLUTION INTRAVENOUS
Qty: 5 ML | Refills: 0 | Status: SHIPPED | OUTPATIENT
Start: 2019-12-03 | End: 2019-12-04

## 2019-12-03 RX ADMIN — BUSPIRONE HYDROCHLORIDE 10 MG: 10 TABLET ORAL at 20:24

## 2019-12-03 RX ADMIN — Medication 5 ML: at 10:08

## 2019-12-03 RX ADMIN — MELATONIN 2000 UNITS: at 08:00

## 2019-12-03 RX ADMIN — SUCRALFATE 1 G: 1 SUSPENSION ORAL at 11:50

## 2019-12-03 RX ADMIN — ENOXAPARIN SODIUM 120 MG: 120 INJECTION SUBCUTANEOUS at 20:24

## 2019-12-03 RX ADMIN — ENOXAPARIN SODIUM 120 MG: 120 INJECTION SUBCUTANEOUS at 08:00

## 2019-12-03 RX ADMIN — METFORMIN HYDROCHLORIDE 500 MG: 500 TABLET, EXTENDED RELEASE ORAL at 17:49

## 2019-12-03 RX ADMIN — LURASIDONE HYDROCHLORIDE 60 MG: 40 TABLET, FILM COATED ORAL at 20:42

## 2019-12-03 RX ADMIN — GABAPENTIN 600 MG: 600 TABLET, FILM COATED ORAL at 13:35

## 2019-12-03 RX ADMIN — TOPIRAMATE 100 MG: 50 TABLET ORAL at 20:42

## 2019-12-03 RX ADMIN — ONDANSETRON 4 MG: 4 TABLET, ORALLY DISINTEGRATING ORAL at 19:38

## 2019-12-03 RX ADMIN — CLONIDINE HYDROCHLORIDE 0.1 MG: 0.1 TABLET ORAL at 08:00

## 2019-12-03 RX ADMIN — SUCRALFATE 1 G: 1 SUSPENSION ORAL at 15:54

## 2019-12-03 RX ADMIN — Medication 5 ML: at 22:16

## 2019-12-03 RX ADMIN — ACETAMINOPHEN 975 MG: 325 SOLUTION ORAL at 17:49

## 2019-12-03 RX ADMIN — PANTOPRAZOLE SODIUM 40 MG: 40 INJECTION, POWDER, FOR SOLUTION INTRAVENOUS at 20:24

## 2019-12-03 RX ADMIN — DESVENLAFAXINE 100 MG: 50 TABLET, FILM COATED, EXTENDED RELEASE ORAL at 08:00

## 2019-12-03 RX ADMIN — SUCRALFATE 1 G: 1 SUSPENSION ORAL at 08:00

## 2019-12-03 RX ADMIN — BUSPIRONE HYDROCHLORIDE 10 MG: 10 TABLET ORAL at 08:00

## 2019-12-03 RX ADMIN — CALCIUM CARBONATE (ANTACID) CHEW TAB 500 MG 500 MG: 500 CHEW TAB at 08:00

## 2019-12-03 RX ADMIN — SUCRALFATE 1 G: 1 SUSPENSION ORAL at 20:24

## 2019-12-03 RX ADMIN — CALCIUM CARBONATE (ANTACID) CHEW TAB 500 MG 500 MG: 500 CHEW TAB at 11:50

## 2019-12-03 RX ADMIN — Medication 5 ML: at 15:54

## 2019-12-03 RX ADMIN — HYDROMORPHONE HYDROCHLORIDE 0.5 MG: 1 INJECTION, SOLUTION INTRAMUSCULAR; INTRAVENOUS; SUBCUTANEOUS at 05:41

## 2019-12-03 RX ADMIN — CALCIUM CARBONATE (ANTACID) CHEW TAB 500 MG 500 MG: 500 CHEW TAB at 17:49

## 2019-12-03 RX ADMIN — ACETAMINOPHEN 975 MG: 325 SOLUTION ORAL at 05:41

## 2019-12-03 RX ADMIN — GABAPENTIN 600 MG: 600 TABLET, FILM COATED ORAL at 08:00

## 2019-12-03 RX ADMIN — PANTOPRAZOLE SODIUM 40 MG: 40 INJECTION, POWDER, FOR SOLUTION INTRAVENOUS at 08:00

## 2019-12-03 RX ADMIN — OXYCODONE HYDROCHLORIDE 5 MG: 5 TABLET ORAL at 17:49

## 2019-12-03 RX ADMIN — GABAPENTIN 600 MG: 600 TABLET, FILM COATED ORAL at 20:24

## 2019-12-03 RX ADMIN — ACETAMINOPHEN 975 MG: 325 SOLUTION ORAL at 11:50

## 2019-12-03 RX ADMIN — CLONIDINE HYDROCHLORIDE 0.1 MG: 0.1 TABLET ORAL at 20:24

## 2019-12-03 RX ADMIN — HYDROMORPHONE HYDROCHLORIDE 0.5 MG: 1 INJECTION, SOLUTION INTRAMUSCULAR; INTRAVENOUS; SUBCUTANEOUS at 10:06

## 2019-12-03 RX ADMIN — Medication 5 ML: at 07:22

## 2019-12-03 RX ADMIN — POTASSIUM CHLORIDE 20 MEQ: 29.8 INJECTION, SOLUTION INTRAVENOUS at 20:25

## 2019-12-03 RX ADMIN — ONDANSETRON 4 MG: 4 TABLET, ORALLY DISINTEGRATING ORAL at 12:44

## 2019-12-03 RX ADMIN — Medication 5 ML: at 06:56

## 2019-12-03 ASSESSMENT — ACTIVITIES OF DAILY LIVING (ADL)
ADLS_ACUITY_SCORE: 9
ADLS_ACUITY_SCORE: 8
ADLS_ACUITY_SCORE: 9
ADLS_ACUITY_SCORE: 9

## 2019-12-03 NOTE — PLAN OF CARE
Status: POD#1 s/p removal of esophageal stent which was placed for esophageal perforation secondary to ingestion of foreign substance. Has hx of borderline personality disorder, depression, PTSD, anxiety, multiple intentional and unintentional overdoses, and complex hx of foreign body ingestion/insertion. Was admitted for  PO intolerance and hemoptysis with new diagnosis of PE   Vitals: VSS on RA   Neuros: Intact.   IV: Port HL.   Resp: WDL   Diet: Advanced to full liquid diet. Had upset stomach and lightheadedness after first full liquid meal, Zofran given x1 and Dr. De Jesus notified.   Bowel status: No BM this shift. BS+ x4.   : Voiding spont.   Skin: Intact.    Pain: C/o throat pain. Dilaudid and Tylenol given x1.   Activity: Up independent. 1:1 at all times to watch for foreign body ingestion.   Social: No visitors present.   Plan: Potential discharge tomorrow once tolerating full liquid diet. Continue to monitor and follow POC

## 2019-12-03 NOTE — PLAN OF CARE
Status: Pt has a hx of borderline personality disorder, depression, PTSD, anxiety, multiple intentional and unintentional overdoses, and complex hx of foreign body ingestion/insertion c/b recent esophageal perforation now s/p esophageal stent (10/9/19) who is being admitted for PO intolerance and hemoptysis w/new diagnosis of PE.   Vitals: VSS on RA   Neuros: AOx4. Intact. Denies N/T  IV: Port infusing LR @ 100 mL/hr. Will HL after bolus.   Resp: WDL   Diet: NPO except meds.  Bowel status: No BM this shift. BS+   : Voiding spont.   Skin: Intact.   Pain: C/o generalized pain. Dilaudid and tylenol given x1.    Activity: Up ind. 1:1 at all times, to watch for foreign body ingestion.   Social: No visitors present.   Plan: Continue to monitor and follow POC.

## 2019-12-03 NOTE — PLAN OF CARE
Status: Pt has a hx of borderline personality disorder, depression, PTSD, anxiety, multiple intentional and unintentional overdoses, and complex hx of foreign body ingestion/insertion c/b recent esophageal perforation now s/p esophageal stent (10/9/19) who is being admitted for PO intolerance and hemoptysis w/new diagnosis of PE. POD 0 from stent removal.   Vitals: VSS on 1 L O2. Capno in place.   Neuros: intact. L dorsiflexion < R.   IV: Port running LR @ 100ml/hr.   Resp/trach: denies SOB.   Diet: NPO ex meds  Bowel status: +bs, bm earlier today  : voiding spontaneously   Skin: intact  Pain: c/o burning and sore throat. Tylenol and IV dilaudid w/ pain relief. Pt states that oxycodone does not help w/ pain.   Activity: independent in room. 1:1 at all times to watch for foreign body ingestion.   Social: on phone frequently throughout night talking to family/friends  Plan: Continue to monitor and work on pain control.

## 2019-12-03 NOTE — PROGRESS NOTES
[facility], [place]    Medicine Progress Note - Hospitalist Service, Gold 8       Date of Admission:  11/28/2019  Assessment & Plan   Nevin Alvarado is a 29 year old female admitted on 11/28/2019. She has a hx of borderline personality disorder, depression, PTSD, anxiety, multiple intentional and unintentional overdoses, and complex hx of foreign body ingestion/insertion c/b recent esophageal perforation now s/p esophageal stent (10/9/19) who is being admitted for p.o intolerance and hemoptysis w/new diagnosis of PE.     TODAY  Lovenox, working on diet, pain control     #  Interlobar/lower lobe emboli in the R pulmonary artery, Provoked  - No chest pain, dyspnea, on RA breathing well, hemoptysis resolved.  No sign of R heart strain on CTA.  Will hold off on further cardiac workup (TTE, trop, BNP).   - In the setting of recent appendectomy and esophageal rupture and stent placement (10/5/2019), appendectomy (11/11/2019) and being sedentary. No personal or FH of hypercoagubility, no miscarriage.  Will call this a provoked, first time PE.  Unusual that she doesn't have DVT, but possibly formed a clot in the leg and that solitary clot went to her lung.   - Has levonorgestrol IUD in place which is ok to continue (Arterioscler Thromb Vasc Biol. 2010;30:6786-3163.)  - Will plan on discharging on enox given hx of N/V, doesn't think she can consistently take PO.   Hold anticoagulation until 12/3/2019 AM.      # N/V, sore throat, chronic  # Recent esophageal perforation with stent, s/p stent removal on 12/2/2019  - Continue home omeprazole, sucralfate  -Zofran + compazine prn  - Per thoracic: NPO 12/2/2019, OK to start clear liquids from 12/3/2019. May advance to full liquid diet.  Week 1 full liquid diet. Week 2 soft mechanical diet. Week 3 regular diet     #Complex psychosocial hx  #Depression  #Anxiety  #Self injurious behavior  #Compulsive foreign body ingestion/insertion  Mental health  assessment in ED and patient interview confirms no active self harm intent, but is in need of more intensive outpatient psych follow up.  Continue home meds: gabapentin 600mg TID, lurasidone, buspirone, topiramate, desvenlafaxine  -1:1 sitter   - Per patient's request, will consult psychology for therapy.      #Chronic normocytic anemia  Suspect multifactorial, with likely micro bleeds from recurrent ingestion/insertions, iron deficiency, and poor diet.     Diet: NPO for Medical/Clinical Reasons Except for: Meds    DVT Prophylaxis: Enox  Hazel Catheter: not present  Code Status: Full code    Disposition Plan   Expected discharge: -12 days, recommended to prior living arrangement once tolerating PO.  Entered: Asher De Jesus MD 12/03/2019, 1:51 PM     The patient's care was discussed with the Bedside Nurse, Patient and thoracic surgery Team.    Asher De Jesus MD  Hospitalist Service, [address], [place]  Pager: [phone]  Please see sticky note for cross cover information  ______________________________________________________________________    Interval History   Doing well.    Denies fever/chills, chest pain, shortness of breathing, abd pain.     Data reviewed today: I reviewed all medications, new labs and imaging results over the last 24 hours. I personally reviewed .    Physical Exam   Vital Signs: Temp: 97.3  F (36.3  C) Temp src: Oral BP: 111/75 Pulse: 85 Heart Rate: 84 Resp: 16 SpO2: 98 % O2 Device: Nasal cannula Oxygen Delivery: 1 LPM  Weight: 248 lbs 12.8 oz  General Appearance: NAD  Respiratory: Breathing well on RA, no dyspnea  Cardiovascular: NOT tachycardia  GI: Soft, NTND  Other: Alert and oriented, moving all ext, anxious appearing

## 2019-12-03 NOTE — CONSULTS
"  Health Psychology                  Clinic    Department of Medicine  Layne Pedraza, PhD, LP [phone]                          Clinics and Surgery Center  [facility] Vj Scales, PhD, LP [phone]                  3rd Floor  [place] Mail Code 741   Arnold Lindsey, PhD, ABPP, LP [phone]     [address] [facility],   [address] [facility],  Carli Perea,  PhD, LP [phone]            [place]  [address]  [place] [address] Cassia Jung, PhD, LP [phone]     Roya Zamora, PhD [phone]     Inpatient Health Psychology Consultation    Date of Service:  12/3/19    BACKGROUND: Per Dr. Sandoval: \"Nevin Alvarado is a 29 year old female admitted on 11/28/2019. She has a hx of borderline personality disorder, depression, PTSD, anxiety, multiple intentional and unintentional overdoses, and complex hx of foreign body ingestion/insertion c/b recent esophageal perforation now s/p esophageal stent (10/9/19) who is being admitted for p.o intolerance and hemoptysis w/new diagnosis of PE.\"  Referred by [address] Service because patient asked to meet with a therapist.    SUBJECTIVE:  Met with Ms. Alvarado to discuss mental health concerns.  She said that she told her provider she wants to meet with a therapist on an outpatient basis as this was recommended in her ED assessment.  We discussed previous therapeutic interactions, what she is looking for in terms of therapist fit, and what goals she may have.    Ms. Alvarado said that she has previously participated in different therapist including DBT and that she still meets with a behavior analyst, although not as frequently as she would like.  She said that her  is trying to have her get connected with another analyst.  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  She said she is hopeful to get " "reconnected because she has recently been more depressed and inactive, sleeping throughout the day and night and not leaving her house. She said that she prefers a female provider and one that come to her apartment because she worries that her motivation is too low and she won't be able to make it to appointments.  She also said she felt that a provider with some knowledge of DBT would be ideal.  We discussed that finding a provider who does in-home services may be difficult and encouraged her to think flexibly about the option to attend services in a clinic.    Discussed possible treatment goals and how she could potentially begin to work towards them now.  She had difficulty identifying goals and when asked about previous therapeutic goals she said that she used to work on her trauma but that she no longer struggles with symptoms of PTSD.  Discussed how decreasing depressive symptoms could be a goal.  She eventually offered the goal of exercising in order to decrease weight.  Discussed ways to increase physical activity.  Also discussed overall goal of taking care of her health and healing from recent esophageal perforation.  She said she was motivated to heal and knew that not ingesting foreign objects was part of this goal.  She said that she feels this is a goal for her behavior analyst.      Reviewed some information about DBT modules and highlighted that emotion regulation skills and distress tolerance are likely the most important to avoid ingesting objects.  Utilized motivation enhancement strategies to highlight her self-identified goal of increasing exercise and discussed ways to do this.  Lastly, returned with printed information about how to find psychologists on her own and local clinics that have DBT services.      OBJECTIVE:  Ms. Alvarado was seated upright in her bed finishing her breakfast during the visit.  Her bedside sitter was present during the visit.  She said her mood was \"ok\" in the " hospital and her affect was flat.  Her thought processes were logical and linear and she demonstrated difficulty answering open-ended questions, frequently needing additional prompts.  Speech WNL.  Denied current suicidal and homicidal ideation.       ASSESSMENT:  Ms. Alvarado describes motivation to participate in therapies and says she has benefited from services in the past yet struggles to identify specific skills that may be useful to cope with her urges to ingest foreign bodies.  She expresses understanding of setting goals related to her health and what behaviors she would need to do to achieve them (I.e. increasing physical activity to lose weight), but struggles to apply this to her mental health.        DIAGNOSIS:  Major depressive disorder, recurrent, moderate (F33.1)  Borderline personality disorder (F60.3)  PTSD (F43.1)      PLAN:  Currently she did not express need for continued use of Health Psychology services, but we are available should she change her mind.  She expressed understanding of how to communicate her psychological needs if she wants to meet.    She may benefit from meeting with social work services to further discuss what mental health services she may qualify for.     Roya Zamora, PhD  Health Psychology Fellow  Phone: [phone]    Time in: 8:55  Time out: 9:43

## 2019-12-03 NOTE — OP NOTE
Procedure Date: 12/02/2019      PREOPERATIVE DIAGNOSES:  History of esophageal tear, status post covered esophageal stent placement.      POSTOPERATIVE DIAGNOSES:  History of esophageal tear, status post covered esophageal stent placement.      PROCEDURES PERFORMED:  EGD, intraoperative esophagram, esophageal stent removal, supervision and interpretation of intraoperative imaging.      SURGEON:  Kailee Barclay MD      ASSISTANT:  Brent Knight, General Surgery Resident      ANESTHESIA:  General endotracheal anesthesia.      COMPLICATIONS:  None.      ESTIMATED BLOOD LOSS:  Minimal.      SPECIMENS:  Covered esophageal stent was removed intact.      IMPLANTS:  None.      INDICATIONS:  Nevin Alvarado is a 29-year-old female patient well known to the Thoracic Surgery service.  She has a complicated psychological history with ingestion of foreign bodies.  Last time during removal of one of the foreign bodies, she underwent an esophageal tear which required endoscopic treatment and covered esophageal stent placement.  She is being brought to the operating room now for an intraoperative esophagram and stent removal.  The need for the procedure, all the risks and benefits were discussed with the patient.  Informed consent was obtained, she agreed to proceed.      OPERATIVE FINDINGS:  The esophageal stent was removed without any problems.  Post-removal, intraoperative esophagram confirmed no evidence of persistent leak and no strictures.  The patient had a moderate amount of granulation tissue at the proximal and distal margins of the stent.  These were nonobstructive.      PROCEDURE IN DETAIL:  The patient was taken to the operating room, placed in a supine position.  All pressure points were padded.  General endotracheal anesthesia was induced.  A formal timeout was carried out confirming the name of the patient and correct procedure.  She had SCDs in place and functioning prior to induction of anesthesia.   After the timeout was completed, we performed an endoscopy.  We introduced the adult scope into the esophagus.  We found the esophageal stent which was grasped with a forceps and pulled out through the mouth.  The stent was removed intact.  Next, we reintroduced the scope and carefully examined the mucosa.  There was no evidence of esophageal tears.  We did find a previously placed endoscopic clip which we left in place.  There was some granulation tissue at the proximal and distal extents of the stent placement.  We performed an intraoperative esophagram which revealed no evidence of leak and no strictures.  The scope was then removed.  The patient tolerated the procedure well and we suctioned all secretions prior to removal of the scope.  All instrument and sponge counts were correct at the end of the case.         MASTER CROFT MD             D: 2019   T: 2019   MT: MONA      Name:     ABAD TONG   MRN:      [id number]        Account:        [id number]   :      1990           Procedure Date: 2019      Document: [id number]

## 2019-12-03 NOTE — PROGRESS NOTES
THORACIC & FOREGUT SURGERY    S:  Pt seen at bedside resting comfortably.       O:  Vitals:    12/02/19 2000 12/02/19 2300 12/03/19 0538 12/03/19 0708   BP: 111/75 118/66 124/59 117/69   BP Location: Right arm Right arm Right arm Right arm   Pulse:       Resp: 16 12 16 14   Temp: 97.3  F (36.3  C) 96.8  F (36  C) 96  F (35.6  C) 96.5  F (35.8  C)   TempSrc: Oral Oral Oral Oral   SpO2: 98% 97% 98% 96%   Weight:       Height:           A&O, NAD  Breathing non-labored  Distal extremities warm    A/P: Nevin Alvarado is a 29 year old female POD#1 s/p removal of esophageal stent which was placed for esophageal perforation secondary to ingestion of foreign body.  Doing well.    -Clear liquids for today, then may progress as follows  Week 1 full liquid diet  Week 2 soft mechanical diet  Week 3 regular diet  -OK for anticoagulation today  -Thoracic to sign off  -Please feel free to call with questions     Jaxon Flores PAFlorindaC  Thoracic Surgery

## 2019-12-04 ENCOUNTER — APPOINTMENT (OUTPATIENT)
Dept: CT IMAGING | Facility: CLINIC | Age: 29
DRG: 176 | End: 2019-12-04
Attending: INTERNAL MEDICINE
Payer: COMMERCIAL

## 2019-12-04 LAB
ANION GAP SERPL CALCULATED.3IONS-SCNC: 5 MMOL/L (ref 3–14)
BASOPHILS # BLD AUTO: 0 10E9/L (ref 0–0.2)
BASOPHILS NFR BLD AUTO: 0.6 %
BUN SERPL-MCNC: 4 MG/DL (ref 7–30)
CALCIUM SERPL-MCNC: 8.7 MG/DL (ref 8.5–10.1)
CHLORIDE SERPL-SCNC: 113 MMOL/L (ref 94–109)
CO2 SERPL-SCNC: 26 MMOL/L (ref 20–32)
CREAT SERPL-MCNC: 0.58 MG/DL (ref 0.52–1.04)
DIFFERENTIAL METHOD BLD: ABNORMAL
EOSINOPHIL # BLD AUTO: 0.2 10E9/L (ref 0–0.7)
EOSINOPHIL NFR BLD AUTO: 4.5 %
ERYTHROCYTE [DISTWIDTH] IN BLOOD BY AUTOMATED COUNT: 15.7 % (ref 10–15)
GFR SERPL CREATININE-BSD FRML MDRD: >90 ML/MIN/{1.73_M2}
GLUCOSE SERPL-MCNC: 84 MG/DL (ref 70–99)
HCT VFR BLD AUTO: 33.1 % (ref 35–47)
HGB BLD-MCNC: 10.2 G/DL (ref 11.7–15.7)
IMM GRANULOCYTES # BLD: 0 10E9/L (ref 0–0.4)
IMM GRANULOCYTES NFR BLD: 0.2 %
LIPASE SERPL-CCNC: 40 U/L (ref 73–393)
LYMPHOCYTES # BLD AUTO: 1.9 10E9/L (ref 0.8–5.3)
LYMPHOCYTES NFR BLD AUTO: 40.4 %
MAGNESIUM SERPL-MCNC: 2 MG/DL (ref 1.6–2.3)
MCH RBC QN AUTO: 28.8 PG (ref 26.5–33)
MCHC RBC AUTO-ENTMCNC: 30.8 G/DL (ref 31.5–36.5)
MCV RBC AUTO: 94 FL (ref 78–100)
MONOCYTES # BLD AUTO: 0.4 10E9/L (ref 0–1.3)
MONOCYTES NFR BLD AUTO: 8.1 %
NEUTROPHILS # BLD AUTO: 2.2 10E9/L (ref 1.6–8.3)
NEUTROPHILS NFR BLD AUTO: 46.2 %
NRBC # BLD AUTO: 0 10*3/UL
NRBC BLD AUTO-RTO: 0 /100
PLATELET # BLD AUTO: 265 10E9/L (ref 150–450)
POTASSIUM SERPL-SCNC: 3.6 MMOL/L (ref 3.4–5.3)
RADIOLOGIST FLAGS: ABNORMAL
RBC # BLD AUTO: 3.54 10E12/L (ref 3.8–5.2)
SODIUM SERPL-SCNC: 143 MMOL/L (ref 133–144)
WBC # BLD AUTO: 4.7 10E9/L (ref 4–11)

## 2019-12-04 PROCEDURE — 71275 CT ANGIOGRAPHY CHEST: CPT

## 2019-12-04 PROCEDURE — 25000132 ZZH RX MED GY IP 250 OP 250 PS 637: Performed by: INTERNAL MEDICINE

## 2019-12-04 PROCEDURE — 25000132 ZZH RX MED GY IP 250 OP 250 PS 637: Performed by: STUDENT IN AN ORGANIZED HEALTH CARE EDUCATION/TRAINING PROGRAM

## 2019-12-04 PROCEDURE — 12000001 ZZH R&B MED SURG/OB UMMC

## 2019-12-04 PROCEDURE — 25000128 H RX IP 250 OP 636: Performed by: STUDENT IN AN ORGANIZED HEALTH CARE EDUCATION/TRAINING PROGRAM

## 2019-12-04 PROCEDURE — 99233 SBSQ HOSP IP/OBS HIGH 50: CPT | Performed by: INTERNAL MEDICINE

## 2019-12-04 PROCEDURE — 25000128 H RX IP 250 OP 636: Performed by: RADIOLOGY

## 2019-12-04 PROCEDURE — 25000128 H RX IP 250 OP 636: Performed by: INTERNAL MEDICINE

## 2019-12-04 PROCEDURE — 83735 ASSAY OF MAGNESIUM: CPT | Performed by: STUDENT IN AN ORGANIZED HEALTH CARE EDUCATION/TRAINING PROGRAM

## 2019-12-04 PROCEDURE — 85025 COMPLETE CBC W/AUTO DIFF WBC: CPT | Performed by: STUDENT IN AN ORGANIZED HEALTH CARE EDUCATION/TRAINING PROGRAM

## 2019-12-04 PROCEDURE — 36592 COLLECT BLOOD FROM PICC: CPT | Performed by: STUDENT IN AN ORGANIZED HEALTH CARE EDUCATION/TRAINING PROGRAM

## 2019-12-04 PROCEDURE — 80048 BASIC METABOLIC PNL TOTAL CA: CPT | Performed by: STUDENT IN AN ORGANIZED HEALTH CARE EDUCATION/TRAINING PROGRAM

## 2019-12-04 PROCEDURE — 25000128 H RX IP 250 OP 636: Performed by: EMERGENCY MEDICINE

## 2019-12-04 PROCEDURE — C9113 INJ PANTOPRAZOLE SODIUM, VIA: HCPCS | Performed by: STUDENT IN AN ORGANIZED HEALTH CARE EDUCATION/TRAINING PROGRAM

## 2019-12-04 PROCEDURE — 83690 ASSAY OF LIPASE: CPT | Performed by: STUDENT IN AN ORGANIZED HEALTH CARE EDUCATION/TRAINING PROGRAM

## 2019-12-04 RX ORDER — IOPAMIDOL 755 MG/ML
73 INJECTION, SOLUTION INTRAVASCULAR ONCE
Status: COMPLETED | OUTPATIENT
Start: 2019-12-04 | End: 2019-12-04

## 2019-12-04 RX ADMIN — CALCIUM CARBONATE (ANTACID) CHEW TAB 500 MG 500 MG: 500 CHEW TAB at 17:10

## 2019-12-04 RX ADMIN — ONDANSETRON 4 MG: 2 INJECTION INTRAMUSCULAR; INTRAVENOUS at 11:39

## 2019-12-04 RX ADMIN — ACETAMINOPHEN 975 MG: 325 SOLUTION ORAL at 19:22

## 2019-12-04 RX ADMIN — LURASIDONE HYDROCHLORIDE 60 MG: 40 TABLET, FILM COATED ORAL at 19:33

## 2019-12-04 RX ADMIN — SUCRALFATE 1 G: 1 SUSPENSION ORAL at 15:41

## 2019-12-04 RX ADMIN — CLONIDINE HYDROCHLORIDE 0.1 MG: 0.1 TABLET ORAL at 19:23

## 2019-12-04 RX ADMIN — GABAPENTIN 600 MG: 600 TABLET, FILM COATED ORAL at 13:44

## 2019-12-04 RX ADMIN — ENOXAPARIN SODIUM 120 MG: 120 INJECTION SUBCUTANEOUS at 19:32

## 2019-12-04 RX ADMIN — METFORMIN HYDROCHLORIDE 500 MG: 500 TABLET, EXTENDED RELEASE ORAL at 17:10

## 2019-12-04 RX ADMIN — CLONIDINE HYDROCHLORIDE 0.1 MG: 0.1 TABLET ORAL at 09:29

## 2019-12-04 RX ADMIN — MELATONIN 2000 UNITS: at 09:33

## 2019-12-04 RX ADMIN — GABAPENTIN 600 MG: 600 TABLET, FILM COATED ORAL at 19:23

## 2019-12-04 RX ADMIN — OXYCODONE HYDROCHLORIDE 10 MG: 5 TABLET ORAL at 09:07

## 2019-12-04 RX ADMIN — SUCRALFATE 1 G: 1 SUSPENSION ORAL at 11:39

## 2019-12-04 RX ADMIN — PANTOPRAZOLE SODIUM 40 MG: 40 INJECTION, POWDER, FOR SOLUTION INTRAVENOUS at 19:23

## 2019-12-04 RX ADMIN — POTASSIUM CHLORIDE 20 MEQ: 29.8 INJECTION, SOLUTION INTRAVENOUS at 10:30

## 2019-12-04 RX ADMIN — SIMETHICONE CHEW TAB 80 MG 80 MG: 80 TABLET ORAL at 22:31

## 2019-12-04 RX ADMIN — PANTOPRAZOLE SODIUM 40 MG: 40 INJECTION, POWDER, FOR SOLUTION INTRAVENOUS at 09:37

## 2019-12-04 RX ADMIN — IOPAMIDOL 73 ML: 755 INJECTION, SOLUTION INTRAVENOUS at 14:22

## 2019-12-04 RX ADMIN — DESVENLAFAXINE 100 MG: 50 TABLET, FILM COATED, EXTENDED RELEASE ORAL at 09:37

## 2019-12-04 RX ADMIN — Medication 5 ML: at 15:41

## 2019-12-04 RX ADMIN — BUSPIRONE HYDROCHLORIDE 10 MG: 10 TABLET ORAL at 19:23

## 2019-12-04 RX ADMIN — ACETAMINOPHEN 975 MG: 325 SOLUTION ORAL at 00:52

## 2019-12-04 RX ADMIN — SIMETHICONE CHEW TAB 80 MG 80 MG: 80 TABLET ORAL at 16:22

## 2019-12-04 RX ADMIN — GABAPENTIN 600 MG: 600 TABLET, FILM COATED ORAL at 09:33

## 2019-12-04 RX ADMIN — SUCRALFATE 1 G: 1 SUSPENSION ORAL at 19:32

## 2019-12-04 RX ADMIN — ENOXAPARIN SODIUM 120 MG: 120 INJECTION SUBCUTANEOUS at 09:38

## 2019-12-04 RX ADMIN — OXYCODONE HYDROCHLORIDE 10 MG: 5 TABLET ORAL at 00:52

## 2019-12-04 RX ADMIN — POTASSIUM CHLORIDE 20 MEQ: 29.8 INJECTION, SOLUTION INTRAVENOUS at 20:21

## 2019-12-04 RX ADMIN — SUCRALFATE 1 G: 1 SUSPENSION ORAL at 09:37

## 2019-12-04 RX ADMIN — BUSPIRONE HYDROCHLORIDE 10 MG: 10 TABLET ORAL at 09:32

## 2019-12-04 RX ADMIN — OXYCODONE HYDROCHLORIDE 10 MG: 5 TABLET ORAL at 19:23

## 2019-12-04 RX ADMIN — CALCIUM CARBONATE (ANTACID) CHEW TAB 500 MG 500 MG: 500 CHEW TAB at 11:39

## 2019-12-04 RX ADMIN — ACETAMINOPHEN 975 MG: 325 SOLUTION ORAL at 09:06

## 2019-12-04 RX ADMIN — CALCIUM CARBONATE (ANTACID) CHEW TAB 500 MG 500 MG: 500 CHEW TAB at 09:32

## 2019-12-04 RX ADMIN — TOPIRAMATE 100 MG: 50 TABLET ORAL at 19:33

## 2019-12-04 ASSESSMENT — ACTIVITIES OF DAILY LIVING (ADL)
ADLS_ACUITY_SCORE: 8
ADLS_ACUITY_SCORE: 8
ADLS_ACUITY_SCORE: 9
ADLS_ACUITY_SCORE: 8

## 2019-12-04 NOTE — PLAN OF CARE
Status: Pt POD#2 s/p removal of esophageal stent, placed for esophageal perforation secondary to ingestion of foreign substance.  Vitals: VSS.   Neuros: A&Ox4. Denied N/T. C/o of nausea when eating but was managed w/ PRN Zofran.   IV: Chest port.   Resp/trach: Stable on RA.   Diet: Full liquid diet.  Bowel status: 1 BM this shift.  : Voiding spontaneously.   Skin: No concerns.  Pain: Denied pain.   Activity: Independent w/ a sitter at bedside.  Social: No visitor this shift.  Plan: CT completed today. Continue to monitor & follow POC.

## 2019-12-04 NOTE — PLAN OF CARE
VSS.  PRN Tylenol and Oxy given x 1 for throat pain with moderate relief.  Pt with flat affect and baseline n/t to hands and feet.  Bedside attendant present for safety.  PAC HL.  Voiding spontaneously.  Had BM yesterday.  Up ad rainer in room.  On full liquid diet; poor PO 2/2 pain and intermittent nausea.  Lovenox teaching started yesterday.  Continue with POC.

## 2019-12-04 NOTE — PLAN OF CARE
"/74   Pulse 85   Temp 95.9  F (35.5  C) (Axillary)   Resp 13   Ht 1.575 m (5' 2\")   Wt 112.9 kg (248 lb 12.8 oz)   LMP 12/02/2014   SpO2 94%   BMI 45.51 kg/m      Pt POD#1 s/p removal of esophageal stent, placed for esophageal perforation secondary to ingestion of foreign substance. Hx of borderline personality disorder, depression, PTSD, anxiety, multiple intentional and unintentional overdoses, and complex hx of foreign body ingestion/insertion. Was admitted for PO intolerance and hemoptysis with new diagnosis of PE.    2214-9564: A/Ox4, VSS on RA. Neuros intact ex flat affect. Sitter at bedside for safety. C/o feeling full and nauseated after eating dinner that was relieved with PRN zofran. Scheduled K given via port. Participated in brief Lovenox education. Up independently. Voiding spontaneously. Denies SOB N/T or pain. Will continue to monitor per POC.   "

## 2019-12-04 NOTE — PLAN OF CARE
Status: Pt POD#1 s/p removal of esophageal stent, placed for esophageal perforation secondary to ingestion of foreign substance. Hx of borderline personality disorder, depression, PTSD, anxiety, multiple intentional and unintentional overdoses, and complex hx of foreign body ingestion/insertion. Was admitted for PO intolerance and hemoptysis with new diagnosis of PE.  Vitals: VSS on RA.  Neuros: A&Ox4. Denies N/T. 5/5 all extremities.  IV: R chest port hep locked.  Resp/trach: Stable on RA. LS clear all fields.  Diet: Tolerating full liquid diet.  Bowel status: No BM this shift. BS active all quads. Last Bm:12/3/19.  : Voids spontaneously.  Skin: Skin intact.  Pain: PRN Tylenol and Oxy given x 1 for throat pain. PRN PO Zofran given x 1 for nausea.  Activity: Up IND. 1:1 sitter at bedside at all times.  Social: No visitors this shift.  Plan: Plan to discharge tomorrow if tolerating PO. Continue to monitor and follow POC.

## 2019-12-04 NOTE — PROGRESS NOTES
[facility], [facility] Progress Note - Hospitalist Service, Gold 8       Date of Admission:  11/28/2019  Assessment & Plan   Nevin Alvarado is a 29 year old female admitted on 11/28/2019. She has a hx of borderline personality disorder, depression, PTSD, anxiety, multiple intentional and unintentional overdoses, and complex hx of foreign body ingestion/insertion c/b recent esophageal perforation now s/p esophageal stent (10/9/19) who is being admitted for p.o intolerance and hemoptysis w/new diagnosis of PE.        #  Interlobar/lower lobe emboli in the R pulmonary artery, Provoked  - No chest pain, dyspnea, on RA breathing well, hemoptysis resolved.  No sign of R heart strain on CTA.  Will hold off on further cardiac workup (TTE, trop, BNP).   - In the setting of recent appendectomy and esophageal rupture and stent placement (10/5/2019), appendectomy (11/11/2019) and being sedentary. No personal or FH of hypercoagubility, no miscarriage.  Will call this a provoked, first time PE.  Unusual that she doesn't have DVT, but possibly formed a clot in the leg and that solitary clot went to her lung.   - Has levonorgestrol IUD in place which is ok to continue (Arterioscler Thromb Vasc Biol. 2010;30:7249-4698.)  - repeating chest CT today, to make sure PE exists, due to the challenges she will face with treatment (risk of swallowing needles vs risk of not taking oral anticoagulant).  - discussed with her outpatient CADI team today, as well as her PCP.  Would prefer oral eliquis (no copay) for 3 mo, vs lovenox.     # N/V, sore throat, chronic  # Recent esophageal perforation with stent, s/p stent removal on 12/2/2019  - Continue home omeprazole, sucralfate  -Zofran + compazine prn  - working on diet  Week 1 full liquid diet. Week 2 soft mechanical diet. Week 3 regular diet     #Complex psychosocial hx  #Depression  #Anxiety  #Self injurious behavior  #Compulsive foreign body  ingestion/insertion  Mental health assessment in ED and patient interview confirms no active self harm intent, but is in need of more intensive outpatient psych follow up.  Continue home meds: gabapentin 600mg TID, lurasidone, buspirone, topiramate, desvenlafaxine  -1:1 sitter   - Per patient's request, will consult psychology for therapy.      #Chronic normocytic anemia  Suspect multifactorial, with likely micro bleeds from recurrent ingestion/insertions, iron deficiency, and poor diet.     Diet: Full Liquid Diet  Diet    DVT Prophylaxis: Enox  Hazel Catheter: not present  Code Status: Full code    Disposition Plan   Expected discharge: -12 days, recommended to prior living arrangement once tolerating PO.  Entered: Asher De Jesus MD 12/04/2019, 8:44 AM     The patient's care was discussed with the Bedside Nurse, Patient and thoracic surgery Team.    Asher De Jesus MD  Hospitalist Service, [address], [place]  Pager: [phone]  Please see sticky note for cross cover information  ______________________________________________________________________    Interval History   Reports continued nausea.  Not tolerating po well.     Denies fever/chills, chest pain, shortness of breathing, abd pain.     Data reviewed today: I reviewed all medications, new labs and imaging results over the last 24 hours. I personally reviewed .    Physical Exam   Vital Signs: Temp: 97.2  F (36.2  C) Temp src: Oral BP: 112/69 Pulse: 71 Heart Rate: 82 Resp: 18 SpO2: 94 % O2 Device: None (Room air)    Weight: 248 lbs 12.8 oz  General Appearance: NAD  Respiratory: Breathing well on RA, no dyspnea  Cardiovascular: NOT tachycardia  GI: Soft, NTND  Other: Alert and oriented, moving all ext, anxious appearing

## 2019-12-05 ENCOUNTER — PATIENT OUTREACH (OUTPATIENT)
Dept: CARE COORDINATION | Facility: CLINIC | Age: 29
End: 2019-12-05

## 2019-12-05 VITALS
DIASTOLIC BLOOD PRESSURE: 71 MMHG | HEIGHT: 62 IN | SYSTOLIC BLOOD PRESSURE: 118 MMHG | RESPIRATION RATE: 16 BRPM | OXYGEN SATURATION: 96 % | HEART RATE: 72 BPM | WEIGHT: 248.8 LBS | BODY MASS INDEX: 45.78 KG/M2 | TEMPERATURE: 97.4 F

## 2019-12-05 LAB
ANION GAP SERPL CALCULATED.3IONS-SCNC: 7 MMOL/L (ref 3–14)
BASOPHILS # BLD AUTO: 0 10E9/L (ref 0–0.2)
BASOPHILS NFR BLD AUTO: 0.6 %
BUN SERPL-MCNC: 4 MG/DL (ref 7–30)
CALCIUM SERPL-MCNC: 8.6 MG/DL (ref 8.5–10.1)
CHLORIDE SERPL-SCNC: 110 MMOL/L (ref 94–109)
CO2 SERPL-SCNC: 26 MMOL/L (ref 20–32)
CREAT SERPL-MCNC: 0.64 MG/DL (ref 0.52–1.04)
DIFFERENTIAL METHOD BLD: ABNORMAL
EOSINOPHIL # BLD AUTO: 0.2 10E9/L (ref 0–0.7)
EOSINOPHIL NFR BLD AUTO: 4.7 %
ERYTHROCYTE [DISTWIDTH] IN BLOOD BY AUTOMATED COUNT: 15.5 % (ref 10–15)
GFR SERPL CREATININE-BSD FRML MDRD: >90 ML/MIN/{1.73_M2}
GLUCOSE SERPL-MCNC: 87 MG/DL (ref 70–99)
HCT VFR BLD AUTO: 32.7 % (ref 35–47)
HGB BLD-MCNC: 10 G/DL (ref 11.7–15.7)
IMM GRANULOCYTES # BLD: 0 10E9/L (ref 0–0.4)
IMM GRANULOCYTES NFR BLD: 0.2 %
LYMPHOCYTES # BLD AUTO: 2 10E9/L (ref 0.8–5.3)
LYMPHOCYTES NFR BLD AUTO: 44 %
MAGNESIUM SERPL-MCNC: 1.9 MG/DL (ref 1.6–2.3)
MCH RBC QN AUTO: 29.1 PG (ref 26.5–33)
MCHC RBC AUTO-ENTMCNC: 30.6 G/DL (ref 31.5–36.5)
MCV RBC AUTO: 95 FL (ref 78–100)
MONOCYTES # BLD AUTO: 0.4 10E9/L (ref 0–1.3)
MONOCYTES NFR BLD AUTO: 8.2 %
NEUTROPHILS # BLD AUTO: 2 10E9/L (ref 1.6–8.3)
NEUTROPHILS NFR BLD AUTO: 42.3 %
NRBC # BLD AUTO: 0 10*3/UL
NRBC BLD AUTO-RTO: 0 /100
PLATELET # BLD AUTO: 254 10E9/L (ref 150–450)
POTASSIUM SERPL-SCNC: 3.6 MMOL/L (ref 3.4–5.3)
RBC # BLD AUTO: 3.44 10E12/L (ref 3.8–5.2)
SODIUM SERPL-SCNC: 142 MMOL/L (ref 133–144)
WBC # BLD AUTO: 4.6 10E9/L (ref 4–11)

## 2019-12-05 PROCEDURE — 36592 COLLECT BLOOD FROM PICC: CPT | Performed by: STUDENT IN AN ORGANIZED HEALTH CARE EDUCATION/TRAINING PROGRAM

## 2019-12-05 PROCEDURE — 99239 HOSP IP/OBS DSCHRG MGMT >30: CPT | Performed by: INTERNAL MEDICINE

## 2019-12-05 PROCEDURE — 25000128 H RX IP 250 OP 636: Performed by: INTERNAL MEDICINE

## 2019-12-05 PROCEDURE — 83735 ASSAY OF MAGNESIUM: CPT | Performed by: STUDENT IN AN ORGANIZED HEALTH CARE EDUCATION/TRAINING PROGRAM

## 2019-12-05 PROCEDURE — 80048 BASIC METABOLIC PNL TOTAL CA: CPT | Performed by: STUDENT IN AN ORGANIZED HEALTH CARE EDUCATION/TRAINING PROGRAM

## 2019-12-05 PROCEDURE — 25000132 ZZH RX MED GY IP 250 OP 250 PS 637: Performed by: INTERNAL MEDICINE

## 2019-12-05 PROCEDURE — 25000128 H RX IP 250 OP 636: Performed by: EMERGENCY MEDICINE

## 2019-12-05 PROCEDURE — 25000132 ZZH RX MED GY IP 250 OP 250 PS 637: Performed by: STUDENT IN AN ORGANIZED HEALTH CARE EDUCATION/TRAINING PROGRAM

## 2019-12-05 PROCEDURE — C9113 INJ PANTOPRAZOLE SODIUM, VIA: HCPCS | Performed by: STUDENT IN AN ORGANIZED HEALTH CARE EDUCATION/TRAINING PROGRAM

## 2019-12-05 PROCEDURE — 25000128 H RX IP 250 OP 636: Performed by: STUDENT IN AN ORGANIZED HEALTH CARE EDUCATION/TRAINING PROGRAM

## 2019-12-05 PROCEDURE — 85025 COMPLETE CBC W/AUTO DIFF WBC: CPT | Performed by: STUDENT IN AN ORGANIZED HEALTH CARE EDUCATION/TRAINING PROGRAM

## 2019-12-05 RX ADMIN — SUCRALFATE 1 G: 1 SUSPENSION ORAL at 08:03

## 2019-12-05 RX ADMIN — PANTOPRAZOLE SODIUM 40 MG: 40 INJECTION, POWDER, FOR SOLUTION INTRAVENOUS at 08:03

## 2019-12-05 RX ADMIN — CLONIDINE HYDROCHLORIDE 0.1 MG: 0.1 TABLET ORAL at 08:03

## 2019-12-05 RX ADMIN — Medication 5 ML: at 09:59

## 2019-12-05 RX ADMIN — OXYCODONE HYDROCHLORIDE 5 MG: 5 TABLET ORAL at 08:05

## 2019-12-05 RX ADMIN — GABAPENTIN 600 MG: 600 TABLET, FILM COATED ORAL at 08:04

## 2019-12-05 RX ADMIN — OXYCODONE HYDROCHLORIDE 5 MG: 5 TABLET ORAL at 10:41

## 2019-12-05 RX ADMIN — MELATONIN 2000 UNITS: at 08:03

## 2019-12-05 RX ADMIN — OXYCODONE HYDROCHLORIDE 10 MG: 5 TABLET ORAL at 01:37

## 2019-12-05 RX ADMIN — ONDANSETRON 4 MG: 4 TABLET, ORALLY DISINTEGRATING ORAL at 00:10

## 2019-12-05 RX ADMIN — DESVENLAFAXINE 100 MG: 50 TABLET, FILM COATED, EXTENDED RELEASE ORAL at 08:03

## 2019-12-05 RX ADMIN — BUSPIRONE HYDROCHLORIDE 10 MG: 10 TABLET ORAL at 08:03

## 2019-12-05 RX ADMIN — POTASSIUM CHLORIDE 20 MEQ: 29.8 INJECTION, SOLUTION INTRAVENOUS at 08:05

## 2019-12-05 RX ADMIN — CALCIUM CARBONATE (ANTACID) CHEW TAB 500 MG 500 MG: 500 CHEW TAB at 08:03

## 2019-12-05 RX ADMIN — ACETAMINOPHEN 975 MG: 325 SOLUTION ORAL at 01:38

## 2019-12-05 RX ADMIN — ENOXAPARIN SODIUM 120 MG: 120 INJECTION SUBCUTANEOUS at 08:02

## 2019-12-05 ASSESSMENT — ACTIVITIES OF DAILY LIVING (ADL)
ADLS_ACUITY_SCORE: 10
ADLS_ACUITY_SCORE: 10
ADLS_ACUITY_SCORE: 8

## 2019-12-05 NOTE — PLAN OF CARE
Status: Pt POD#2 s/p removal of esophageal stent, placed for esophageal perforation secondary to ingestion of foreign substance. Hx of borderline personality disorder, depression, PTSD, anxiety, multiple intentional and unintentional overdoses, and complex hx of foreign body ingestion/insertion. Was admitted for PO intolerance and hemoptysis with new diagnosis of PE.  Vitals: VSS on RA.  Neuros: A&Ox4. Denies N/T. 5/5 all extremities.  IV: R chest port hep locked.   Resp/trach: Stable on RA. LS clear all fields.  Diet: Tolerating full liquid diet.  Bowel status: Pt complaining of loose BM's. BS active all quads. Last Bm:12/4/19.  : Voids spontaneously.  Skin: Skin intact.  Pain: PRN Tylenol and Oxy given x 1 for stomach pain. PRN Simethicone given x 2 for stomach cramping.  Activity: Up IND. 1:1 sitter at bedside at all times.  Social: No visitors this shift.  Plan: PT had concern about going home on PO Eliquis d/t it interacting with 4 other of her medications, MD notified and will be further evaluated prior to discharge. Plan to discharge to home tomorrow. Continue to monitor and follow POC.

## 2019-12-05 NOTE — DISCHARGE SUMMARY
[facility], [place]  Hospitalist Discharge Summary       Date of Admission:  11/28/2019  Date of Discharge:  12/5/2019 11:14 AM  Discharging Provider: Asher De Jesus MD  Discharge Team: Hospitalist Service, Gold 8    Discharge Diagnoses   Acute provoked pulmonary embolus  Recent esophageal perforation with stent, s/p stent removal on 12/2/201  Depression  Anxiety  Compulsive foreign body ingestion/insertion  Chronic normocytic anemia    Follow-ups Needed After Discharge   Follow-up Appointments     Adult [facility] Follow-up and recommended labs and tests      Follow up with primary care provider, Latonya Knight, within 7 days for   hospital follow- up.        Appointments on [place] and/or [facility] (with [facility] or [facility]   provider or service). Call [phone] if you haven't heard regarding   these appointments within 7 days of discharge.          Would recommend 3 months of anticoagulation; could consider obtaining a baseline D-dimer at first clinic visit, and follow this over time, if there are concerns about adherence to eliquis.      Discharge Disposition   Discharged to home  Condition at discharge: Stable    Hospital Course   Nevin Alvarado is a 29 year old female admitted on 11/28/2019. She has a hx of borderline personality disorder, depression, PTSD, anxiety, multiple intentional and unintentional overdoses, and complex hx of foreign body ingestion/insertion c/b recent esophageal perforation now s/p esophageal stent (10/9/19) who is being admitted for p.o intolerance and hemoptysis w/new diagnosis of PE.  Stent removed by thoracic surgery during hospital stay, with plans for slow up titration of diet (see below).  In addition, coordinated discharge plan via telephone with patient's primary care provider as well as CADI  and mental health , see below       #  Interlobar/lower lobe emboli in the R pulmonary artery, Provoked  - No chest pain,  dyspnea, on RA breathing well, hemoptysis resolved.  No sign of R heart strain on CTA.  Held off on further cardiac workup (TTE, trop, BNP).   - In the setting of recent appendectomy and esophageal rupture and stent placement (10/5/2019), appendectomy (11/11/2019) and being sedentary. No personal or FH of hypercoagubility, no miscarriage.  Will call this a provoked, first time PE.  Unusual that she doesn't have DVT, but possibly formed a clot in the leg and that solitary clot went to her lung.   - Has levonorgestrol IUD in place which is ok to continue (Arterioscler Thromb Vasc Biol. 2010;30:1702-9421.)  As treatment is a challenge (adherence to po tabs may be a challenge, and there are safety issues of sending needles home with her, we confirmed PE after discussion with radiology, by having a second chest CT performed.  Due to the challenges she will face with treatment (risk of swallowing needles vs risk of not taking oral anticoagulant).  - discussed with her outpatient CADI team prior to discharge, as well as her PCP.  Would prefer oral eliquis (no copay) for 3 mo, vs enoxaparin (also no copay). Of note, she had initially been presented with the idea of home enoxaparin and prefers that.  I did spent quite some time discussing with her that in her case, due to the risks of swallowing needles, eliquis is safer.     Could consider trending D-dimer over time if adherence is in question.    # N/V, sore throat, chronic  # Recent esophageal perforation with stent, s/p stent removal on 12/2/2019  - Continue home omeprazole, sucralfate  -Zofran + compazine prn  - working on diet  Week 1 full liquid diet. Week 2 soft mechanical diet. Week 3 regular diet     #Complex psychosocial hx  #Depression  #Anxiety  #Self injurious behavior  #Compulsive foreign body ingestion/insertion  Mental health assessment in ED and patient interview confirms no active self harm intent, but is in need of more intensive outpatient psych follow  up.  Continue home meds: gabapentin 600mg TID, lurasidone, buspirone, topiramate, desvenlafaxine  -1:1 sitter   - Per patient's request, consulted psychology for therapy.      #Chronic normocytic anemia  Suspect multifactorial, with likely micro bleeds from recurrent ingestion/insertions, iron deficiency, and poor diet.  Monitored with no changes during hospital stay    Consultations This Hospital Stay   GI LUMINAL ADULT IP CONSULT  MEDICATION HISTORY IP PHARMACY CONSULT  PSYCHOLOGY ADULT IP CONSULT    Code Status   Full Code    Time Spent on this Encounter   I, Asher De Jesus MD, personally saw the patient today and spent greater than 30 minutes discharging this patient.       Asher De Jesus MD  [facility] [place]  ______________________________________________________________________    Physical Exam   Vital Signs: Temp: 97.4  F (36.3  C) Temp src: Oral BP: 118/71 Pulse: 72 Heart Rate: 96 Resp: 16 SpO2: 96 % O2 Device: None (Room air)    Weight: 248 lbs 12.8 oz  Gen: NAD  HEENT: EOMI, PERRL, MMM  CV: extremities warm and well perfused, RRR, S1S2  Resp: CTA B/L, breathing comfortably on RA  : deferred  Msk: no LE edema  Skin: no rashes  Neuro: nonfocal exam           Primary Care Physician   Latonya Knight    Discharge Orders      Activity    Your activity upon discharge: activity as tolerated     Adult [facility] [place] Follow-up and recommended labs and tests    Follow up with primary care provider, Latonya Knight, within 7 days for hospital follow- up.        Appointments on [place] and/or [facility] (with [facility] [place] or [facility] provider or service). Call [phone] if you haven't heard regarding these appointments within 7 days of discharge.     Reason for your hospital stay    Dear Nevin Alvarado    Your were hospitalized at [facility] and had your esophageal stent evaluated.   You were also found to have a blood clot in your lungs (a pulmonary embolus).   Over your hospitalization your condition was stable, and today you are ready to be discharged home.  If you continue lovenox shots (a blood thinner) the blood clot in the lungs will dissolve.  If you develop fever, shortness of breath, light headedness, chest pain or other new medical concerns, please seek medical attention.    We are suggesting the following medication changes:  1. Eliquis, blood thinner: take 2 pills twice a day for 1 week, then decrease to 1 pill twice a day.  You will need to take this for 3 months.    Diet plan:  Week 1 full liquid diet  Week 2 soft mechanical diet  Week 3 regular diet    Please set up an appointment with:  1. Your primary care provider, Dr. Knight, in 1 week    It was a pleasure meeting with you today. Thank you for allowing me and my team the privilege of caring for you today. You are the reason we are here, and I truly hope we provided you with the excellent service you deserve. Please let us know if there is anything else we can do for you so that we can be sure you are leaving completely satisfied with your care experience.    Your hospital unit at the time of discharge is 6A so if you have any questions please call the hospital at [phone] and ask to talk to a nurse on 6A.    Take care!  Asher De Jesus MD  Department of Medicine  [facility]     Full Code     Diet    Follow this diet upon discharge: Week 1 full liquid diet  Week 2 soft mechanical diet  Week 3 regular diet       Significant Results and Procedures   Results for orders placed or performed during the hospital encounter of 11/28/19   CT Chest Pulmonary Embolism w Contrast    Narrative    Examination: CT CHEST PULMONARY EMBOLISM W CONTRAST, 11/28/2019 9:15  PM    Comparison: CT 10/15/2019.    History: hemoptysis (concern for PE or esophageal perf).    Technique: Axial thin slice images from the lung apices to the  diaphragm were obtained following the injection of intravenous  contrast media in the  pulmonary arterial phase. Dual-energy study  performed.    Contrast dose: iopamidol (ISOVUE-370) solution 73 mL    Radiation Dose (DLP): 317 mGy*cm    Findings:   CHEST:    VESSELS AND HEART:  There is adequate contrast bolus in the study. Filling defect within  the right interlobar and lower lobe pulmonary artery consistent with  pulmonary embolism. No perfusion deficits are noted on the 3-D  reconstruction in the high iodine perfusion map. No evidence of right  heart strain. The heart is not enlarged. Thoracic aorta is within  normal limits and without evidence of dissection or aneurysm.  AIRWAYS: Within normal limits  LUNG: Within normal limits.  PLEURA: Within normal limits.  MEDIASTINUM AND MARANDA: Within normal limits.  CHEST WALL AND LOWER NECK: Within normal limits.  BONES: No suspicious lesions.    Esophageal stent in place. Esophagus is full of debris. One foci of  air outside of the stent is seen on series 10, image 92 this is likely  still within the esophagus. No evidence for esophageal rupture. No  mediastinal air.    UPPER ABDOMEN:  Limited evaluation of the upper abdomen demonstrates no acute  parenchymal abnormalities, nonobstructive bowel gas pattern, and no  free fluid or free air.      Impression    IMPRESSION:   1. Filling defect within the right interlobar and lower lobe pulmonary  artery compatible with pulmonary embolism. No perfusion deficits are  noted on the 3-D reconstruction.    2. Esophageal stent in place, esophagus is full of debris. No evidence  for esophageal perforation.    I have personally reviewed the examination and initial interpretation  and I agree with the findings.    RICHELLE JACKSON MD   US Lower Extremity Venous Bilateral Port    Narrative    EXAMINATION: US LOWER EXTREMITY VENOUS DUPLEX BILATERAL PORT,  11/29/2019 1:24 PM     COMPARISON: 11/5/2017 ultrasound    HISTORY: New PE.  R/o DVT    TECHNIQUE:  Gray-scale evaluation with compression, spectral flow and  color Doppler  assessment of the deep venous system of both legs from  groin to knee, and then at the ankles.    FINDINGS:  In the both lower extremities, the common femoral, femoral, popliteal,  peroneal, and posterior tibial veins demonstrate normal  compressibility and blood flow.        Impression    IMPRESSION:  No evidence of deep venous thrombosis in either lower extremity.    I have personally reviewed the examination and initial interpretation  and I agree with the findings.    PATRICK FAM, DO   XR Surgery DULCE Fluoro L/T 5 Min w Stills    Narrative    This exam was marked as non-reportable because it will not be read by a   radiologist or a [place] non-radiologist provider.             CT Chest Pulmonary Embolism w Contrast     Value    Radiologist flags (Urgent)     Confirmation of right lower lobe pulmonary embolus was    Narrative    EXAMINATION: CT CHEST PULMONARY EMBOLISM W CONTRAST, 12/4/2019 2:38 PM      COMPARISON: 11/28/2019    HISTORY: PE suspected, intermediate probability, negative d-dimer,  patient with PE noted on recent CT    TECHNIQUE: Volumetric helical acquisition of CT images of the chest  from the lung apices to the kidneys were acquired after the  administration of 73 mL of Isovue-370 IV contrast.     Total DLP: 295 mGy*cm.    Findings:   Chest: There is adequate contrast bolus in the study. No evidence of  right heart strain. Exam is positive for pulmonary embolus in the  segmental and subsegmental right lower lobe pulmonary arteries similar  to prior. No new pulmonary emboli identified.    Poor inspiratory effort. No suspicious pulmonary nodule. No  pneumothorax. No large pleural effusion. Mild dependent atelectasis  bilaterally.    The heart is not enlarged. No large pericardial effusion. Ascending  aorta and main pulmonary artery are normal caliber. Normal branching  of the great vessels. Partially visualized thyroid is unremarkable. No  new or enlarging lymphadenopathy in the chest. Right  Port-A-Cath  distal tip slightly obscured by contrast but appears to reside in the  right atrium.    Esophageal stent has been removed. There is diffuse thickening and  inflammatory change about the mid to distal esophagus.. There is a  residual 1.6 x 0.5 cm metallic foreign density in the midportion of  the esophagus at the level of the superior aortic arch.    Visualized upper abdomen: Visualized upper abdomen is unremarkable.    Bones: No suspicious osseous abnormalities.      Impression    Impression:    1. Exam is positive for lobar pulmonary embolus involving the right  lower lobe similar to prior.  2. Esophageal stent has been removed. There is residual thickening and  inflammatory change about the mid and distal esophagus. Additionally  there is a residual metallic foreign body in the midesophagus at the  level of the aortic arch which may be postsurgical versus new ingested  foreign body.    [Urgent Result: Confirmation of right lower lobe pulmonary embolus was  discussed with Dr. Shepard by Dr. Lopez]    Finding was identified on 12/4/2019 2:52 PM.     Dr. Shepard was contacted by Dr. Lopez at 12/4/2019 2:55 PM and  verbalized understanding of the urgent finding.     I have personally reviewed the examination and initial interpretation  and I agree with the findings.    RICHELLE JACKSON MD       Discharge Medications   Current Discharge Medication List      START taking these medications    Details   apixaban ANTICOAGULANT (ELIQUIS STARTER PACK) 5 MG tablet Take 2 tablets (10 mg) by mouth 2 times daily for 7 days, THEN 1 tablet (5 mg) 2 times daily for 23 days.  Qty: 74 tablet, Refills: 0    Associated Diagnoses: Acute pulmonary embolism without acute cor pulmonale, unspecified pulmonary embolism type (H)      !! ondansetron (ZOFRAN-ODT) 4 MG ODT tab Take 1 tablet (4 mg) by mouth every 6 hours as needed for nausea or vomiting  Qty: 10 tablet, Refills: 0    Associated Diagnoses: Poor appetite       !! -  Potential duplicate medications found. Please discuss with provider.      CONTINUE these medications which have CHANGED    Details   potassium chloride (KLOR-CON) 20 MEQ packet Take 20 mEq by mouth 2 times daily  Qty: 30 packet, Refills: 0    Associated Diagnoses: Poor appetite         CONTINUE these medications which have NOT CHANGED    Details   acetaminophen (TYLENOL) 32 mg/mL liquid Take 31.25 mLs (1,000 mg) by mouth every 6 hours as needed for fever or pain  Qty: 355 mL, Refills: 0    Associated Diagnoses: Esophageal dysphagia; Hx of foreign body ingestion      albuterol (PROAIR HFA) 108 (90 Base) MCG/ACT inhaler Inhale 2 puffs into the lungs 4 times daily as needed       alum & mag hydroxide-simethicone (MYLANTA/MAALOX) 200-200-20 MG/5ML SUSP suspension Take 30 mLs by mouth every 6 hours as needed for indigestion      busPIRone (BUSPAR) 10 MG tablet Take 1 tablet (10 mg) by mouth twice daily      calcium carbonate (TUMS) 500 MG chewable tablet Take 1 chew tab by mouth 3 times daily as needed       cloNIDine (CATAPRES) 0.1 MG tablet Take 0.1 mg by mouth 2 times daily       desvenlafaxine (PRISTIQ) 100 MG 24 hr tablet Take 1 tablet (100 mg) by mouth every morning      diphenhydrAMINE (BENADRYL) 25 MG capsule Take 1-2 capsules (25-50 mg) by mouth every 6 hours as needed for itching or sleep      docosanol (ABREVA) 10 % CREA cream Apply to lip 5 times a day as soon as symptoms begin, do not use for more than 10 days.      gabapentin (NEURONTIN) 600 MG tablet Take 600 mg by mouth 3 times daily       levonorgestrel (MIRENA) 20 MCG/24HR IUD 1 each by Intrauterine route once      lidocaine (XYLOCAINE) 2 % solution Swish and spit 15 mLs in mouth every 6 hours as needed (soar throat)  Qty: 100 mL, Refills: 0    Associated Diagnoses: Esophageal dysphagia; Hx of foreign body ingestion      lurasidone (LATUDA) 60 MG TABS tablet Take 1 tablet (60 mg) by mouth at bedtime      metFORMIN (GLUCOPHAGE-XR) 500 MG 24 hr tablet  Take 1 tablet (500 mg) by mouth daily      omeprazole (PRILOSEC) 2 mg/mL suspension Take 10 mLs (20 mg) by mouth every morning (before breakfast)  Qty: 100 mL, Refills: 3    Associated Diagnoses: Esophageal dysphagia; Hx of foreign body ingestion      !! ondansetron (ZOFRAN-ODT) 8 MG ODT tab Take 1 tablet (8 mg) by mouth every 6 hours as needed for nausea or vomiting  Qty: 20 tablet, Refills: 1    Associated Diagnoses: Swallowed foreign body, initial encounter      oxyCODONE (ROXICODONE) 5 MG tablet Take 5 mg by mouth every 4 hours as needed (pain) (patient rarely uses)      simethicone (MYLICON) 80 MG chewable tablet Take 1 tablet (80 mg) by mouth every 6 hours as needed for flatulence or cramping (after meals)  Qty: 30 tablet, Refills: 0    Associated Diagnoses: Esophageal dysphagia; Hx of foreign body ingestion      topiramate (TOPAMAX) 100 MG tablet Take 1 tablet (100 mg) by mouth daily at bedtime      vitamin D3 (CHOLECALCIFEROL) 2000 units (50 mcg) tablet Take 1 tablet (2,000 units) by mouth daily      sucralfate (CARAFATE) 1 GM/10ML suspension Take 10 mLs (1 g) by mouth 4 times daily  Qty: 420 mL, Refills: 1    Associated Diagnoses: Esophageal dysphagia       !! - Potential duplicate medications found. Please discuss with provider.      STOP taking these medications       fluticasone-salmeterol (ADVAIR) 100-50 MCG/DOSE inhaler Comments:   Reason for Stopping:             Allergies   Allergies   Allergen Reactions     Amoxicillin-Pot Clavulanate Other (See Comments), Rash and Swelling     PN: facial swelling, left side. Also had cortisone injection the same day as she started the Augmentin.  PN: facial swelling, left side. Also had cortisone injection the same day as she started the Augmentin.  Noted in downtime recovery of chart.       Penicillins Anaphylaxis     Vancomycin Swelling, Itching and Rash     Other reaction(s): Redness  Flushed face, very itchy; HUT Comment: Flushed face, very itchy; HUT Reaction:  Angioedema; HUT Reaction: Redness; HUT Severity: Med; HUT Noted: [id number]  Flushed face, very itchy  Flushed face, very itchy  Flushed face, very itchy       Hydrocodone Nausea and Vomiting and GI Disturbance     vomiting for days  vomiting for days  vomiting for days  vomiting for days, PN: vomiting for days  vomiting for days  vomiting for days  vomiting for days  vomiting for days  vomiting for days  vomiting for days, PN: vomiting for days  vomiting for days  vomiting for days  vomiting for days  vomiting for days  ; HUT Comment: vomiting for days; HUT Reaction: Gastrointestinal; HUT Reaction: Nausea And Vomiting; HUT Reaction Type: Intolerance; HUT Severity: Med; HUT Noted: [id number]  vomiting for days       Blood-Group Specific Substance Other (See Comments)     Patient has an anti-Cw and non-specific antibodies. Blood product orders may be delayed. Draw one red top and two purple top tubes for all type/screen/crossmatch orders.     Influenza Vaccines Other (See Comments)     Oseltamivir Hives     med stopped, PN: med stopped     Cephalosporins Rash     Lamotrigine Rash     Possibly associated with Lamictal.   HUT Comment: Possibly associated with Lamictal. ; HUT Reaction: Rash; HUT Reaction Type: Allergy; HUT Severity: Low; HUT Noted: [id number]     Latex Rash

## 2019-12-05 NOTE — PLAN OF CARE
VSS.  PRN Tylenol and Oxy given x 1 for throat pain with moderate relief.  Pt with flat affect and baseline n/t to hands and feet.  Bedside attendant present for safety.  PAC HL.  Voiding spontaneously.  Had multiple loose BM's yesterday.  Up ad rainer in room.  On full liquid diet; improved intake.  PRN Zofran given x 1 for nausea.  Continue with POC.

## 2019-12-05 NOTE — PLAN OF CARE
Pt POD#3 removal of esophageal stent, placed for esophageal perforation secondary to ingestion of foreign substance. Hx of borderline personality disorder, depression, PTSD, anxiety, multiple intentional and unintentional overdoses, and complex hx of foreign body ingestion/insertion. Was admitted for PO intolerance and hemoptysis with new diagnosis of PE. VSS, neuros include: a/o x4, flat affect. R chest port de-accessed this morning without issues, up ad rainer, full-liquid diet, PRN pain meds provided with good relief. RN went through discharge education with pt, RN emphasized importance of her dietary restrictions and pt asked questions about certain foods and RN reeducated on appropriate food preferences and her restriction timeline. RN discussed with MD (STUART De Jesus) about plan for Eliquis and he said it was appropriate to start Friday Dec. 6, 2019. Attendant is helping pt  meds and will wait with pt until she is in mother's car. Pt left via w/c with bedside attendant and mother will  pt @ front door.

## 2019-12-06 ENCOUNTER — HOSPITAL ENCOUNTER (EMERGENCY)
Facility: CLINIC | Age: 29
Discharge: HOME | End: 2019-12-06
Attending: EMERGENCY MEDICINE | Admitting: EMERGENCY MEDICINE
Payer: COMMERCIAL

## 2019-12-06 ENCOUNTER — NURSE TRIAGE (OUTPATIENT)
Dept: NURSING | Facility: CLINIC | Age: 29
End: 2019-12-06

## 2019-12-06 VITALS
RESPIRATION RATE: 16 BRPM | TEMPERATURE: 97 F | SYSTOLIC BLOOD PRESSURE: 140 MMHG | BODY MASS INDEX: 45.51 KG/M2 | DIASTOLIC BLOOD PRESSURE: 83 MMHG | HEIGHT: 62 IN | OXYGEN SATURATION: 95 %

## 2019-12-06 DIAGNOSIS — H57.02 ANISOCORIA: ICD-10-CM

## 2019-12-06 PROCEDURE — 99283 EMERGENCY DEPT VISIT LOW MDM: CPT | Mod: Z6 | Performed by: EMERGENCY MEDICINE

## 2019-12-06 PROCEDURE — 99282 EMERGENCY DEPT VISIT SF MDM: CPT | Performed by: EMERGENCY MEDICINE

## 2019-12-06 RX ORDER — PROPARACAINE HYDROCHLORIDE 5 MG/ML
SOLUTION/ DROPS OPHTHALMIC
Status: DISCONTINUED
Start: 2019-12-06 | End: 2019-12-06 | Stop reason: HOSPADM

## 2019-12-06 NOTE — ED AVS SNAPSHOT
[facility] [place], Emergency Department  [address] [place] [id number]  Phone:  [phone]                                    Nevin Alvarado   MRN: [id number]    Department:  [facility], Emergency Department   Date of Visit:  12/6/2019           After Visit Summary Signature Page    I have received my discharge instructions, and my questions have been answered. I have discussed any challenges I see with this plan with the nurse or doctor.    ..........................................................................................................................................  Patient/Patient Representative Signature      ..........................................................................................................................................  Patient Representative Print Name and Relationship to Patient    ..................................................               ................................................  Date                                   Time    ..........................................................................................................................................  Reviewed by Signature/Title    ...................................................              ..............................................  Date                                               Time          22EPIC Rev 08/18

## 2019-12-07 NOTE — ED PROVIDER NOTES
History     Chief Complaint   Patient presents with     Eye Problem     HPI  Nevin Alvarado is a 29 year old female with a history of ingestion of foreign objects who was recently admitted last week for a PE and placed on Eliquis.  Patient was discharged home yesterday from the hospital and states that over the last 2 days she has developed some periorbital left eye pain.  Patient states that this is associated with blurriness of vision of her left eye as well.  Patient states she told the admitting team about this prior to discharge and was told to follow-up with an eye clinic.  Patient initially tells me she did not see an eye doctor and came here to the ER however documentation in care everywhere reveals the patient was seen by an eye clinic earlier today.  Patient reports no trauma.  Patient has proven to be an unreliable historian.    I have reviewed the Medications, Allergies, Past Medical and Surgical History, and Social History in the [facility] system.    Past Medical History:   Diagnosis Date     ADD (attention deficit disorder)      Anorexia nervosa with bulimia     history of; on Topamax     Anxiety      Borderline personality disorder (H)      Depression      Depressive disorder      H/O self-harm      Lives in independent group home     due to debilitating mental illness     Migraine without aura     no known triggers; on Topamax bid and Imitrex PRN     Morbid obesity (H)      PTSD (post-traumatic stress disorder)      Rectal foreign body - Recurrent issue, self placed      Swallowed foreign body - Recurrent issue, self placed      Current Outpatient Medications   Medication Sig Dispense Refill     acetaminophen (TYLENOL) 32 mg/mL liquid Take 31.25 mLs (1,000 mg) by mouth every 6 hours as needed for fever or pain 355 mL 0     albuterol (PROAIR HFA) 108 (90 Base) MCG/ACT inhaler Inhale 2 puffs into the lungs 4 times daily as needed        alum & mag hydroxide-simethicone (MYLANTA/MAALOX) 200-200-20  MG/5ML SUSP suspension Take 30 mLs by mouth every 6 hours as needed for indigestion       apixaban ANTICOAGULANT (ELIQUIS STARTER PACK) 5 MG tablet Take 2 tablets (10 mg) by mouth 2 times daily for 7 days, THEN 1 tablet (5 mg) 2 times daily for 23 days. 74 tablet 0     busPIRone (BUSPAR) 10 MG tablet Take 1 tablet (10 mg) by mouth twice daily       calcium carbonate (TUMS) 500 MG chewable tablet Take 1 chew tab by mouth 3 times daily as needed        cloNIDine (CATAPRES) 0.1 MG tablet Take 0.1 mg by mouth 2 times daily        desvenlafaxine (PRISTIQ) 100 MG 24 hr tablet Take 1 tablet (100 mg) by mouth every morning       diphenhydrAMINE (BENADRYL) 25 MG capsule Take 1-2 capsules (25-50 mg) by mouth every 6 hours as needed for itching or sleep       docosanol (ABREVA) 10 % CREA cream Apply to lip 5 times a day as soon as symptoms begin, do not use for more than 10 days.       gabapentin (NEURONTIN) 600 MG tablet Take 600 mg by mouth 3 times daily        levonorgestrel (MIRENA) 20 MCG/24HR IUD 1 each by Intrauterine route once       lidocaine (XYLOCAINE) 2 % solution Swish and spit 15 mLs in mouth every 6 hours as needed (soar throat) 100 mL 0     lurasidone (LATUDA) 60 MG TABS tablet Take 1 tablet (60 mg) by mouth at bedtime       metFORMIN (GLUCOPHAGE-XR) 500 MG 24 hr tablet Take 1 tablet (500 mg) by mouth daily       omeprazole (PRILOSEC) 2 mg/mL suspension Take 10 mLs (20 mg) by mouth every morning (before breakfast) (Patient taking differently: Take 20 mg by mouth daily as needed ) 100 mL 3     ondansetron (ZOFRAN-ODT) 4 MG ODT tab Take 1 tablet (4 mg) by mouth every 6 hours as needed for nausea or vomiting 10 tablet 0     ondansetron (ZOFRAN-ODT) 8 MG ODT tab Take 1 tablet (8 mg) by mouth every 6 hours as needed for nausea or vomiting 20 tablet 1     oxyCODONE (ROXICODONE) 5 MG tablet Take 5 mg by mouth every 4 hours as needed (pain) (patient rarely uses)       potassium chloride (KLOR-CON) 20 MEQ packet Take 20  mEq by mouth 2 times daily 30 packet 0     simethicone (MYLICON) 80 MG chewable tablet Take 1 tablet (80 mg) by mouth every 6 hours as needed for flatulence or cramping (after meals) 30 tablet 0     sucralfate (CARAFATE) 1 GM/10ML suspension Take 10 mLs (1 g) by mouth 4 times daily 420 mL 1     topiramate (TOPAMAX) 100 MG tablet Take 1 tablet (100 mg) by mouth daily at bedtime       vitamin D3 (CHOLECALCIFEROL) 2000 units (50 mcg) tablet Take 1 tablet (2,000 units) by mouth daily       Allergies   Allergen Reactions     Amoxicillin-Pot Clavulanate Other (See Comments), Rash and Swelling     PN: facial swelling, left side. Also had cortisone injection the same day as she started the Augmentin.  PN: facial swelling, left side. Also had cortisone injection the same day as she started the Augmentin.  Noted in downtime recovery of chart.       Penicillins Anaphylaxis     Vancomycin Swelling, Itching and Rash     Other reaction(s): Redness  Flushed face, very itchy; HUT Comment: Flushed face, very itchy; HUT Reaction: Angioedema; HUT Reaction: Redness; HUT Severity: Med; HUT Noted: [id number]  Flushed face, very itchy  Flushed face, very itchy  Flushed face, very itchy       Hydrocodone Nausea and Vomiting and GI Disturbance     vomiting for days  vomiting for days  vomiting for days  vomiting for days, PN: vomiting for days  vomiting for days  vomiting for days  vomiting for days  vomiting for days  vomiting for days  vomiting for days, PN: vomiting for days  vomiting for days  vomiting for days  vomiting for days  vomiting for days  ; HUT Comment: vomiting for days; HUT Reaction: Gastrointestinal; HUT Reaction: Nausea And Vomiting; HUT Reaction Type: Intolerance; HUT Severity: Med; HUT Noted: [id number]  vomiting for days       Blood-Group Specific Substance Other (See Comments)     Patient has an anti-Cw and non-specific antibodies. Blood product orders may be delayed. Draw one red top and two purple top tubes for all  type/screen/crossmatch orders.     Influenza Vaccines Other (See Comments)     Oseltamivir Hives     med stopped, PN: med stopped     Cephalosporins Rash     Lamotrigine Rash     Possibly associated with Lamictal.   HUT Comment: Possibly associated with Lamictal. ; HUT Reaction: Rash; HUT Reaction Type: Allergy; HUT Severity: Low; HUT Noted: [id number]     Latex Rash     Social History     Socioeconomic History     Marital status: Single     Spouse name: Not on file     Number of children: Not on file     Years of education: Not on file     Highest education level: Not on file   Occupational History     Occupation: On disability   Social Needs     Financial resource strain: Not on file     Food insecurity:     Worry: Not on file     Inability: Not on file     Transportation needs:     Medical: Not on file     Non-medical: Not on file   Tobacco Use     Smoking status: Never Smoker     Smokeless tobacco: Never Used   Substance and Sexual Activity     Alcohol use: No     Alcohol/week: 0.0 standard drinks     Drug use: No     Sexual activity: Yes     Partners: Male     Birth control/protection: I.U.D.     Comment: IUD - Mirena - placed July, 2015   Lifestyle     Physical activity:     Days per week: Not on file     Minutes per session: Not on file     Stress: Not on file   Relationships     Social connections:     Talks on phone: Not on file     Gets together: Not on file     Attends [employer] service: Not on file     Active member of club or organization: Not on file     Attends meetings of clubs or organizations: Not on file     Relationship status: Not on file     Intimate partner violence:     Fear of current or ex partner: Not on file     Emotionally abused: Not on file     Physically abused: Not on file     Forced sexual activity: Not on file   Other Topics Concern     Parent/sibling w/ CABG, MI or angioplasty before 65F 55M? Not Asked   Social History Narrative    Single.    Living in independent living portion of  [facility].    On disability.    No regular exercise.      Past Surgical History:   Procedure Laterality Date     COMBINED ESOPHAGOSCOPY, GASTROSCOPY, DUODENOSCOPY (EGD), REPLACE ESOPHAGEAL STENT N/A 10/9/2019    Procedure: Upper Endoscopy with Suture Placement;  Surgeon: Zurdo Ramirez MD;  Location: [facility]     ESOPHAGOSCOPY, GASTROSCOPY, DUODENOSCOPY (EGD), COMBINED N/A 3/9/2017    Procedure: COMBINED ESOPHAGOSCOPY, GASTROSCOPY, DUODENOSCOPY (EGD), REMOVE FOREIGN BODY;  Surgeon: Avis Guzmán MD;  Location: [facility]     ESOPHAGOSCOPY, GASTROSCOPY, DUODENOSCOPY (EGD), COMBINED N/A 4/20/2017    Procedure: COMBINED ESOPHAGOSCOPY, GASTROSCOPY, DUODENOSCOPY (EGD), REMOVE FOREIGN BODY;  EGD removal Foregin body;  Surgeon: Loeksh Paula MD;  Location: [facility]     ESOPHAGOSCOPY, GASTROSCOPY, DUODENOSCOPY (EGD), COMBINED N/A 6/12/2017    Procedure: COMBINED ESOPHAGOSCOPY, GASTROSCOPY, DUODENOSCOPY (EGD);  COMBINED ESOPHAGOSCOPY, GASTROSCOPY, DUODENOSCOPY (EGD) [[id number]]attempted removal of foreign body;  Surgeon: Pamela Perez MD;  Location: [facility]     ESOPHAGOSCOPY, GASTROSCOPY, DUODENOSCOPY (EGD), COMBINED N/A 6/9/2017    Procedure: COMBINED ESOPHAGOSCOPY, GASTROSCOPY, DUODENOSCOPY (EGD), REMOVE FOREIGN BODY;  Esophagoscopy, Gastroscopy, Duodenoscopy, Removal of Foreign Body;  Surgeon: Dejon Marsh MD;  Location: [facility]     ESOPHAGOSCOPY, GASTROSCOPY, DUODENOSCOPY (EGD), COMBINED N/A 1/6/2018    Procedure: COMBINED ESOPHAGOSCOPY, GASTROSCOPY, DUODENOSCOPY (EGD), REMOVE FOREIGN BODY;  COMBINED ESOPHAGOSCOPY, GASTROSCOPY, DUODENOSCOPY (EGD) [by pascal net and snare with profol sedation;  Surgeon: Feliciano Emmanuel MD;  Location:  GI     ESOPHAGOSCOPY, GASTROSCOPY, DUODENOSCOPY (EGD), COMBINED N/A 3/19/2018    Procedure: COMBINED ESOPHAGOSCOPY, GASTROSCOPY, DUODENOSCOPY (EGD), REMOVE FOREIGN BODY;   Esophagodscopy, Gastroscopy, Duodenoscopy,Foreign Body  Removal;  Surgeon: Brice Guzmán MD;  Location: [facility]     ESOPHAGOSCOPY, GASTROSCOPY, DUODENOSCOPY (EGD), COMBINED N/A 4/16/2018    Procedure: COMBINED ESOPHAGOSCOPY, GASTROSCOPY, DUODENOSCOPY (EGD), REMOVE FOREIGN BODY;  Esophagogastroduodenoscopy  Foreign Body Removal X 2;  Surgeon: Royer Moise MD;  Location: [facility]     ESOPHAGOSCOPY, GASTROSCOPY, DUODENOSCOPY (EGD), COMBINED N/A 6/1/2018    Procedure: COMBINED ESOPHAGOSCOPY, GASTROSCOPY, DUODENOSCOPY (EGD), REMOVE FOREIGN BODY;  COMBINED ESOPHAGOSCOPY, GASTROSCOPY, DUODENOSCOPY with removal of foreign body, propofol sedation by anesthesia;  Surgeon: Brice Martinez MD;  Location:  GI     ESOPHAGOSCOPY, GASTROSCOPY, DUODENOSCOPY (EGD), COMBINED N/A 7/25/2018    Procedure: COMBINED ESOPHAGOSCOPY, GASTROSCOPY, DUODENOSCOPY (EGD), REMOVE FOREIGN BODY;;  Surgeon: Candy Castelan MD;  Location:  GI     ESOPHAGOSCOPY, GASTROSCOPY, DUODENOSCOPY (EGD), COMBINED N/A 7/28/2018    Procedure: COMBINED ESOPHAGOSCOPY, GASTROSCOPY, DUODENOSCOPY (EGD), REMOVE FOREIGN BODY;  COMBINED ESOPHAGOSCOPY, GASTROSCOPY, DUODENOSCOPY (EGD), REMOVE FOREIGN BODY;  Surgeon: Brice Guzmán MD;  [facility]: [facility]     ESOPHAGOSCOPY, GASTROSCOPY, DUODENOSCOPY (EGD), COMBINED N/A 7/31/2018    Procedure: COMBINED ESOPHAGOSCOPY, GASTROSCOPY, DUODENOSCOPY (EGD);  COMBINED ESOPHAGOSCOPY, GASTROSCOPY, DUODENOSCOPY (EGD) TO REMOVE FOREIGN BODY;  Surgeon: Lokesh Paula MD;  [facility]: [facility]     ESOPHAGOSCOPY, GASTROSCOPY, DUODENOSCOPY (EGD), COMBINED N/A 8/4/2018    Procedure: COMBINED ESOPHAGOSCOPY, GASTROSCOPY, DUODENOSCOPY (EGD), REMOVE FOREIGN BODY;   combined esophagoscopy, gastroscopy, duodenoscopy, REMOVE FOREIGN BODY ;  Surgeon: Lokesh Paula MD;  Location: [facility]     ESOPHAGOSCOPY, GASTROSCOPY, DUODENOSCOPY (EGD), COMBINED N/A 10/6/2019    Procedure: ESOPHAGOGASTRODUODENOSCOPY (EGD) with fireign body removal x2, esophageal stent placement with  floroscopy;  Surgeon: Timoteo Espana MD;  Location: [facility]     ESOPHAGOSCOPY, GASTROSCOPY, DUODENOSCOPY (EGD), COMBINED N/A 12/2/2019    Procedure: Esophagogastroduodenoscopy with esophageal stent removal, esophogram;  Surgeon: Kailee Tena MD;  Location: [facility]     EXAM UNDER ANESTHESIA ANUS N/A 1/10/2017    Procedure: EXAM UNDER ANESTHESIA ANUS;  Surgeon: Annmarie Haynes MD;  [facility]: [facility]     EXAM UNDER ANESTHESIA RECTUM N/A 7/19/2018    Procedure: EXAM UNDER ANESTHESIA RECTUM;  EXAM UNDER ANESTHESIA, REMOVAL OF RECTAL FOREIGN BODY;  Surgeon: Annmarie Haynes MD;  Location: [facility]     HC REMOVE FECAL IMPACTION OR FB W ANESTHESIA N/A 12/18/2016    Procedure: REMOVE FECAL IMPACTION/FOREIGN BODY UNDER ANESTHESIA;  Surgeon: Soham Cano MD;  Location: [facility] OR     KNEE SURGERY - removed a small tissue mass from knee Right      LAPAROSCOPIC ABLATION ENDOMETRIOSIS       LAPAROTOMY EXPLORATORY N/A 1/10/2017    Procedure: LAPAROTOMY EXPLORATORY;  Surgeon: Annmarie Haynes MD;  Location: [facility]     LAPAROTOMY EXPLORATORY  09/11/2019    Manual manipulation of bowel to remove pill bottle in rectum     lymph nodes removed from neck; benign  age 7     MAMMOPLASTY REDUCTION Bilateral      RELEASE CARPAL TUNNEL Bilateral      SIGMOIDOSCOPY FLEXIBLE N/A 1/10/2017    Procedure: SIGMOIDOSCOPY FLEXIBLE;  Surgeon: Annmarie Haynes MD;  Location: [facility]     SIGMOIDOSCOPY FLEXIBLE N/A 5/8/2018    Procedure: SIGMOIDOSCOPY FLEXIBLE;  flex sig with foreign body removal using snare and rattooth forcep;  Surgeon: Soham Cano MD;  Location: [facility]     SIGMOIDOSCOPY FLEXIBLE N/A 2/20/2019    Procedure: Exam under anesthesia Colonoscopy with attempted  removal of rectal foreign body;  Surgeon: Estrada Chávez MD;  Location: [facility]     Family History   Problem Relation Age of Onset     Diabetes Type 2  Maternal Grandmother      Diabetes Type 2  Paternal Grandmother      Breast Cancer  "Paternal Grandmother      Cerebrovascular Disease Father 53     Myocardial Infarction No family hx of      Coronary Artery Disease Early Onset No family hx of      Ovarian Cancer No family hx of      Colon Cancer No family hx of        Review of Systems   All other systems reviewed and are negative.      Physical Exam   BP: (!) 140/83  Heart Rate: 106  Temp: 97  F (36.1  C)  Resp: 16  Height: 157.5 cm (5' 2\")  SpO2: 95 %      Physical Exam  Vitals signs and nursing note reviewed.   Constitutional:       General: She is not in acute distress.  HENT:      Head: Atraumatic.   Eyes:      Extraocular Movements: Extraocular movements intact.      Conjunctiva/sclera: Conjunctivae normal.      Comments: Patient's visual acuity is 20/25 OD and 20/40 OS.    Funduscopic exam reveals no abnormalities.    The patient's pupils are 8 mm on the left and 4 mm on the right.  The right pupil reacts briskly to light while the left pupil reacts but not quite as briskly.    Juan-Pen measurement of intraocular pressure on the left eye is 21.   Neck:      Musculoskeletal: Neck supple.   Pulmonary:      Effort: No respiratory distress.   Musculoskeletal:         General: No tenderness.   Skin:     General: Skin is warm.   Neurological:      Mental Status: She is alert and oriented to person, place, and time.   Psychiatric:      Comments: Flat         ED Course        Procedures        The case was discussed with our ophthalmology resident on-call who pointed out the Epic note earlier today from an [facility] ophthalmologist and their eye exam.  Their presumed diagnosis was iatrogenic pupillary dilatation done by the patient herself.  At that time the patient's pupil did not react at all but now it is starting to react and at this time there is nothing more to do except to have the patient follow-up as an outpatient.  It should be noted the patient lied to me about being seen by an eye doctor earlier today.      Assessments & Plan (with Medical " Decision Making)     I have reviewed the nursing notes.    I have reviewed the findings, diagnosis, plan and need for follow up with the patient.    New Prescriptions    No medications on file       Final diagnoses:   Anisocoria - L>R   This is most likely iatrogenic and self-induced    Please make an appointment to follow up with your Eye Clinic or our Eye Clinic (phone: [phone]) in 3 days for recheck if symptoms persist.    Bruce Pryor MD, MD      12/6/2019   [facility], [place], EMERGENCY DEPARTMENT     Bruce Pryor MD  12/06/19 [id number]

## 2019-12-07 NOTE — ED TRIAGE NOTES
Here with L eye pain and blurred vision, L eye is dilated 8 mm, R eye is 4 mm, reactive to light. Reprts eye complaints began about 4 days ago and has gotten worse.

## 2019-12-07 NOTE — DISCHARGE INSTRUCTIONS
Please make an appointment to follow up with your Eye Clinic or our Eye Clinic (phone: [phone]) in 3 days for recheck if symptoms persist.

## 2019-12-07 NOTE — ED PROVIDER NOTES
[facility] EMERGENCY DEPARTMENT ([facility])  December 6, 2019    History     Chief Complaint   Patient presents with     Eye Problem     HPI  Nevin Alvarado is a 29 year old female who commonly ingests items and does self-harm. She was recently admitted to the hospital for a pulmonary embolus for a week and was sent home yesterday on Eliquis.  Patient states that 2 days ago, she started to have some left eye pain with some blurriness of vision.  Patient states that she told somebody prior to discharge and was told to go see an eye doctor.  The patient was discharged home and presents now here to the ER with worsening left eye pain more in the periorbital type area instead of the eye itself.  Patient states she has generalized blurriness with her left eye and states that she has no increased pain with extraocular movements.  The patient presents here to the ER for evaluation denies any fevers.    This part of the medical record was transcribed by [facility] Scribe, from a dictation done by Bruce Pryor MD.         PAST MEDICAL HISTORY  Past Medical History:   Diagnosis Date     ADD (attention deficit disorder)      Anorexia nervosa with bulimia     history of; on Topamax     Anxiety      Borderline personality disorder (H)      Depression      Depressive disorder      H/O self-harm      Lives in independent group home     due to debilitating mental illness     Migraine without aura     no known triggers; on Topamax bid and Imitrex PRN     Morbid obesity (H)      PTSD (post-traumatic stress disorder)      Rectal foreign body - Recurrent issue, self placed      Swallowed foreign body - Recurrent issue, self placed      PAST SURGICAL HISTORY  Past Surgical History:   Procedure Laterality Date     COMBINED ESOPHAGOSCOPY, GASTROSCOPY, DUODENOSCOPY (EGD), REPLACE ESOPHAGEAL STENT N/A 10/9/2019    Procedure: Upper Endoscopy with Suture Placement;  Surgeon: Zurdo Ramirez MD;   Location: [facility]     ESOPHAGOSCOPY, GASTROSCOPY, DUODENOSCOPY (EGD), COMBINED N/A 3/9/2017    Procedure: COMBINED ESOPHAGOSCOPY, GASTROSCOPY, DUODENOSCOPY (EGD), REMOVE FOREIGN BODY;  Surgeon: Avis Guzmán MD;  Location: [facility] OR     ESOPHAGOSCOPY, GASTROSCOPY, DUODENOSCOPY (EGD), COMBINED N/A 4/20/2017    Procedure: COMBINED ESOPHAGOSCOPY, GASTROSCOPY, DUODENOSCOPY (EGD), REMOVE FOREIGN BODY;  EGD removal Foregin body;  Surgeon: Lokesh Paula MD;  Location: [facility]     ESOPHAGOSCOPY, GASTROSCOPY, DUODENOSCOPY (EGD), COMBINED N/A 6/12/2017    Procedure: COMBINED ESOPHAGOSCOPY, GASTROSCOPY, DUODENOSCOPY (EGD);  COMBINED ESOPHAGOSCOPY, GASTROSCOPY, DUODENOSCOPY (EGD) [[id number]]attempted removal of foreign body;  Surgeon: Pamela Perez MD;  Location: [facility]     ESOPHAGOSCOPY, GASTROSCOPY, DUODENOSCOPY (EGD), COMBINED N/A 6/9/2017    Procedure: COMBINED ESOPHAGOSCOPY, GASTROSCOPY, DUODENOSCOPY (EGD), REMOVE FOREIGN BODY;  Esophagoscopy, Gastroscopy, Duodenoscopy, Removal of Foreign Body;  Surgeon: Dejon Marsh MD;  Location: [facility]     ESOPHAGOSCOPY, GASTROSCOPY, DUODENOSCOPY (EGD), COMBINED N/A 1/6/2018    Procedure: COMBINED ESOPHAGOSCOPY, GASTROSCOPY, DUODENOSCOPY (EGD), REMOVE FOREIGN BODY;  COMBINED ESOPHAGOSCOPY, GASTROSCOPY, DUODENOSCOPY (EGD) [by pascal net and snare with profol sedation;  Surgeon: Feliciano Emmanuel MD;  Location:  GI     ESOPHAGOSCOPY, GASTROSCOPY, DUODENOSCOPY (EGD), COMBINED N/A 3/19/2018    Procedure: COMBINED ESOPHAGOSCOPY, GASTROSCOPY, DUODENOSCOPY (EGD), REMOVE FOREIGN BODY;   Esophagodscopy, Gastroscopy, Duodenoscopy,Foreign Body Removal;  Surgeon: Brice Guzmán MD;  Location: [facility]     ESOPHAGOSCOPY, GASTROSCOPY, DUODENOSCOPY (EGD), COMBINED N/A 4/16/2018    Procedure: COMBINED ESOPHAGOSCOPY, GASTROSCOPY, DUODENOSCOPY (EGD), REMOVE FOREIGN BODY;  Esophagogastroduodenoscopy  Foreign Body Removal X 2;  Surgeon: Royer Moise,  MD;  [facility]: [facility]     ESOPHAGOSCOPY, GASTROSCOPY, DUODENOSCOPY (EGD), COMBINED N/A 6/1/2018    Procedure: COMBINED ESOPHAGOSCOPY, GASTROSCOPY, DUODENOSCOPY (EGD), REMOVE FOREIGN BODY;  COMBINED ESOPHAGOSCOPY, GASTROSCOPY, DUODENOSCOPY with removal of foreign body, propofol sedation by anesthesia;  Surgeon: Brice Martinez MD;  Location:  GI     ESOPHAGOSCOPY, GASTROSCOPY, DUODENOSCOPY (EGD), COMBINED N/A 7/25/2018    Procedure: COMBINED ESOPHAGOSCOPY, GASTROSCOPY, DUODENOSCOPY (EGD), REMOVE FOREIGN BODY;;  Surgeon: Candy Castelan MD;  Location:  GI     ESOPHAGOSCOPY, GASTROSCOPY, DUODENOSCOPY (EGD), COMBINED N/A 7/28/2018    Procedure: COMBINED ESOPHAGOSCOPY, GASTROSCOPY, DUODENOSCOPY (EGD), REMOVE FOREIGN BODY;  COMBINED ESOPHAGOSCOPY, GASTROSCOPY, DUODENOSCOPY (EGD), REMOVE FOREIGN BODY;  Surgeon: Brice Guzmán MD;  [facility]     ESOPHAGOSCOPY, GASTROSCOPY, DUODENOSCOPY (EGD), COMBINED N/A 7/31/2018    Procedure: COMBINED ESOPHAGOSCOPY, GASTROSCOPY, DUODENOSCOPY (EGD);  COMBINED ESOPHAGOSCOPY, GASTROSCOPY, DUODENOSCOPY (EGD) TO REMOVE FOREIGN BODY;  Surgeon: Lokesh Paula MD;  [facility]     ESOPHAGOSCOPY, GASTROSCOPY, DUODENOSCOPY (EGD), COMBINED N/A 8/4/2018    Procedure: COMBINED ESOPHAGOSCOPY, GASTROSCOPY, DUODENOSCOPY (EGD), REMOVE FOREIGN BODY;   combined esophagoscopy, gastroscopy, duodenoscopy, REMOVE FOREIGN BODY ;  Surgeon: Lokesh Paula MD;  [facility]: [facility]     ESOPHAGOSCOPY, GASTROSCOPY, DUODENOSCOPY (EGD), COMBINED N/A 10/6/2019    Procedure: ESOPHAGOGASTRODUODENOSCOPY (EGD) with fireign body removal x2, esophageal stent placement with floroscopy;  Surgeon: Timoteo Espana MD;  [facility]: [facility]     ESOPHAGOSCOPY, GASTROSCOPY, DUODENOSCOPY (EGD), COMBINED N/A 12/2/2019    Procedure: Esophagogastroduodenoscopy with esophageal stent removal, esophogram;  Surgeon: Kailee Tena MD;  [facility]: [facility]     EXAM UNDER ANESTHESIA ANUS N/A 1/10/2017     Procedure: EXAM UNDER ANESTHESIA ANUS;  Surgeon: Annmarie Haynes MD;  [facility]: [facility]     EXAM UNDER ANESTHESIA RECTUM N/A 7/19/2018    Procedure: EXAM UNDER ANESTHESIA RECTUM;  EXAM UNDER ANESTHESIA, REMOVAL OF RECTAL FOREIGN BODY;  Surgeon: Annmarie Haynes MD;  [facility]: [facility]     HC REMOVE FECAL IMPACTION OR FB W ANESTHESIA N/A 12/18/2016    Procedure: REMOVE FECAL IMPACTION/FOREIGN BODY UNDER ANESTHESIA;  Surgeon: Soham Cano MD;  Location: [facility] OR     KNEE SURGERY - removed a small tissue mass from knee Right      LAPAROSCOPIC ABLATION ENDOMETRIOSIS       LAPAROTOMY EXPLORATORY N/A 1/10/2017    Procedure: LAPAROTOMY EXPLORATORY;  Surgeon: Annmarie Haynes MD;  Location: [facility]     LAPAROTOMY EXPLORATORY  09/11/2019    Manual manipulation of bowel to remove pill bottle in rectum     lymph nodes removed from neck; benign  age 7     MAMMOPLASTY REDUCTION Bilateral      RELEASE CARPAL TUNNEL Bilateral      SIGMOIDOSCOPY FLEXIBLE N/A 1/10/2017    Procedure: SIGMOIDOSCOPY FLEXIBLE;  Surgeon: Annmarie Haynes MD;  [facility]: [facility]     SIGMOIDOSCOPY FLEXIBLE N/A 5/8/2018    Procedure: SIGMOIDOSCOPY FLEXIBLE;  flex sig with foreign body removal using snare and rattooth forcep;  Surgeon: Soham Cano MD;  Location:  GI     SIGMOIDOSCOPY FLEXIBLE N/A 2/20/2019    Procedure: Exam under anesthesia Colonoscopy with attempted  removal of rectal foreign body;  Surgeon: Estrada Chávez MD;  Location: [facility]     FAMILY HISTORY  Family History   Problem Relation Age of Onset     Diabetes Type 2  Maternal Grandmother      Diabetes Type 2  Paternal Grandmother      Breast Cancer Paternal Grandmother      Cerebrovascular Disease Father 53     Myocardial Infarction No family hx of      Coronary Artery Disease Early Onset No family hx of      Ovarian Cancer No family hx of      Colon Cancer No family hx of      SOCIAL HISTORY  Social History     Tobacco Use     Smoking status: Never  Smoker     Smokeless tobacco: Never Used   Substance Use Topics     Alcohol use: No     Alcohol/week: 0.0 standard drinks     MEDICATIONS  Previous Medications    ACETAMINOPHEN (TYLENOL) 32 MG/ML LIQUID    Take 31.25 mLs (1,000 mg) by mouth every 6 hours as needed for fever or pain    ALBUTEROL (PROAIR HFA) 108 (90 BASE) MCG/ACT INHALER    Inhale 2 puffs into the lungs 4 times daily as needed     ALUM & MAG HYDROXIDE-SIMETHICONE (MYLANTA/MAALOX) 200-200-20 MG/5ML SUSP SUSPENSION    Take 30 mLs by mouth every 6 hours as needed for indigestion    APIXABAN ANTICOAGULANT (ELIQUIS STARTER PACK) 5 MG TABLET    Take 2 tablets (10 mg) by mouth 2 times daily for 7 days, THEN 1 tablet (5 mg) 2 times daily for 23 days.    BUSPIRONE (BUSPAR) 10 MG TABLET    Take 1 tablet (10 mg) by mouth twice daily    CALCIUM CARBONATE (TUMS) 500 MG CHEWABLE TABLET    Take 1 chew tab by mouth 3 times daily as needed     CLONIDINE (CATAPRES) 0.1 MG TABLET    Take 0.1 mg by mouth 2 times daily     DESVENLAFAXINE (PRISTIQ) 100 MG 24 HR TABLET    Take 1 tablet (100 mg) by mouth every morning    DIPHENHYDRAMINE (BENADRYL) 25 MG CAPSULE    Take 1-2 capsules (25-50 mg) by mouth every 6 hours as needed for itching or sleep    DOCOSANOL (ABREVA) 10 % CREA CREAM    Apply to lip 5 times a day as soon as symptoms begin, do not use for more than 10 days.    GABAPENTIN (NEURONTIN) 600 MG TABLET    Take 600 mg by mouth 3 times daily     LEVONORGESTREL (MIRENA) 20 MCG/24HR IUD    1 each by Intrauterine route once    LIDOCAINE (XYLOCAINE) 2 % SOLUTION    Swish and spit 15 mLs in mouth every 6 hours as needed (soar throat)    LURASIDONE (LATUDA) 60 MG TABS TABLET    Take 1 tablet (60 mg) by mouth at bedtime    METFORMIN (GLUCOPHAGE-XR) 500 MG 24 HR TABLET    Take 1 tablet (500 mg) by mouth daily    OMEPRAZOLE (PRILOSEC) 2 MG/ML SUSPENSION    Take 10 mLs (20 mg) by mouth every morning (before breakfast)    ONDANSETRON (ZOFRAN-ODT) 4 MG ODT TAB    Take 1 tablet  (4 mg) by mouth every 6 hours as needed for nausea or vomiting    ONDANSETRON (ZOFRAN-ODT) 8 MG ODT TAB    Take 1 tablet (8 mg) by mouth every 6 hours as needed for nausea or vomiting    OXYCODONE (ROXICODONE) 5 MG TABLET    Take 5 mg by mouth every 4 hours as needed (pain) (patient rarely uses)    POTASSIUM CHLORIDE (KLOR-CON) 20 MEQ PACKET    Take 20 mEq by mouth 2 times daily    SIMETHICONE (MYLICON) 80 MG CHEWABLE TABLET    Take 1 tablet (80 mg) by mouth every 6 hours as needed for flatulence or cramping (after meals)    SUCRALFATE (CARAFATE) 1 GM/10ML SUSPENSION    Take 10 mLs (1 g) by mouth 4 times daily    TOPIRAMATE (TOPAMAX) 100 MG TABLET    Take 1 tablet (100 mg) by mouth daily at bedtime    VITAMIN D3 (CHOLECALCIFEROL) 2000 UNITS (50 MCG) TABLET    Take 1 tablet (2,000 units) by mouth daily     ALLERGIES  Allergies   Allergen Reactions     Amoxicillin-Pot Clavulanate Other (See Comments), Rash and Swelling     PN: facial swelling, left side. Also had cortisone injection the same day as she started the Augmentin.  PN: facial swelling, left side. Also had cortisone injection the same day as she started the Augmentin.  Noted in downtime recovery of chart.       Penicillins Anaphylaxis     Vancomycin Swelling, Itching and Rash     Other reaction(s): Redness  Flushed face, very itchy; HUT Comment: Flushed face, very itchy; HUT Reaction: Angioedema; HUT Reaction: Redness; HUT Severity: Med; HUT Noted: [id number]  Flushed face, very itchy  Flushed face, very itchy  Flushed face, very itchy       Hydrocodone Nausea and Vomiting and GI Disturbance     vomiting for days  vomiting for days  vomiting for days  vomiting for days, PN: vomiting for days  vomiting for days  vomiting for days  vomiting for days  vomiting for days  vomiting for days  vomiting for days, PN: vomiting for days  vomiting for days  vomiting for days  vomiting for days  vomiting for days  ; HUT Comment: vomiting for days; HUT Reaction:  "Gastrointestinal; HUT Reaction: Nausea And Vomiting; HUT Reaction Type: Intolerance; HUT Severity: Med; HUT Noted: [id number]  vomiting for days       Blood-Group Specific Substance Other (See Comments)     Patient has an anti-Cw and non-specific antibodies. Blood product orders may be delayed. Draw one red top and two purple top tubes for all type/screen/crossmatch orders.     Influenza Vaccines Other (See Comments)     Oseltamivir Hives     med stopped, PN: med stopped     Cephalosporins Rash     Lamotrigine Rash     Possibly associated with Lamictal.   HUT Comment: Possibly associated with Lamictal. ; HUT Reaction: Rash; HUT Reaction Type: Allergy; HUT Severity: Low; HUT Noted: [id number]     Latex Rash       I have reviewed the Medications, Allergies, Past Medical and Surgical History, and Social History in the Epic system.    Review of Systems   All other systems reviewed and are negative.      Physical Exam   BP: (!) 140/83  Heart Rate: 106  Temp: 97  F (36.1  C)  Resp: 16  Height: 157.5 cm (5' 2\")  SpO2: 95 %      Physical Exam    ED Course        Procedures        {EKG done?:343467::\" \"}    Critical Care time:  {none or minutes:938343::\"none\"}  {trauma activation or Fall?:882878::\" \"}  {Sepsis/Septic Shock/Stemi/Stroke:653292::\" \"}       Labs Ordered and Resulted from Time of ED Arrival Up to the Time of Departure from the ED - No data to display         Assessments & Plan (with Medical Decision Making)   ***    I have reviewed the nursing notes.    I have reviewed the findings, diagnosis, plan and need for follow up with the patient.    New Prescriptions    No medications on file       Final diagnoses:   None       12/6/2019   [facility], [place], EMERGENCY DEPARTMENT  "

## 2019-12-07 NOTE — TELEPHONE ENCOUNTER
"Nevin reports worsening of pain around left eye, rated 7/10. Reports blurring of vision, states that \"left eye is dilated.\" Symptoms ongoing for 3 days.     Per protocol, advised to head to ED. Care advice reviewed. Patient verbalizes understanding and [facility]. Advised to call back with further questions/concerns. FNA emphasized to have someone drive her to the ED and she agreed.    Isabella Torres RN/[place] Nurse Advisor      Reason for Disposition    [1] Blurred vision AND [2] new or worsening    Additional Information    Negative: Severe eye pain    Negative: Complete loss of vision in one or both eyes    Negative: [1] Eyelids are very swollen (shut or almost) AND [2] fever    Negative: [1] Eyelid (outer) is very red AND [2] fever    Negative: [1] Foreign body sensation (\"feels like something is in there\") AND [2] irrigation didn't help    Negative: Vomiting    Negative: Ulcer or sore seen on the cornea (clear center part of the eye)    Negative: [1] Recent eye surgery AND [2] increasing eye pain    Protocols used: EYE PAIN-A-AH      "

## 2019-12-08 ENCOUNTER — HOSPITAL ENCOUNTER (EMERGENCY)
Facility: CLINIC | Age: 29
Discharge: HOME | End: 2019-12-09
Attending: INTERNAL MEDICINE | Admitting: INTERNAL MEDICINE
Payer: COMMERCIAL

## 2019-12-08 DIAGNOSIS — R04.2 HEMOPTYSIS: ICD-10-CM

## 2019-12-08 DIAGNOSIS — I26.99 ACUTE PULMONARY EMBOLISM WITHOUT ACUTE COR PULMONALE, UNSPECIFIED PULMONARY EMBOLISM TYPE (H): ICD-10-CM

## 2019-12-08 PROCEDURE — 99284 EMERGENCY DEPT VISIT MOD MDM: CPT | Mod: Z6 | Performed by: INTERNAL MEDICINE

## 2019-12-08 PROCEDURE — 99284 EMERGENCY DEPT VISIT MOD MDM: CPT | Mod: 25 | Performed by: INTERNAL MEDICINE

## 2019-12-08 NOTE — ED AVS SNAPSHOT
[facility], [place], Emergency Department  [address] [place] [id number]  Phone:  [phone]                                    Nevin Alvarado   MRN: [id number]    Department:  [facility], Emergency Department   Date of Visit:  12/8/2019           After Visit Summary Signature Page    I have received my discharge instructions, and my questions have been answered. I have discussed any challenges I see with this plan with the nurse or doctor.    ..........................................................................................................................................  Patient/Patient Representative Signature      ..........................................................................................................................................  Patient Representative Print Name and Relationship to Patient    ..................................................               ................................................  Date                                   Time    ..........................................................................................................................................  Reviewed by Signature/Title    ...................................................              ..............................................  Date                                               Time          22EPIC Rev 08/18

## 2019-12-09 ENCOUNTER — APPOINTMENT (OUTPATIENT)
Dept: GENERAL RADIOLOGY | Facility: CLINIC | Age: 29
End: 2019-12-09
Attending: INTERNAL MEDICINE
Payer: COMMERCIAL

## 2019-12-09 VITALS
OXYGEN SATURATION: 100 % | TEMPERATURE: 98.3 F | HEART RATE: 90 BPM | DIASTOLIC BLOOD PRESSURE: 78 MMHG | WEIGHT: 242 LBS | BODY MASS INDEX: 44.26 KG/M2 | RESPIRATION RATE: 16 BRPM | SYSTOLIC BLOOD PRESSURE: 121 MMHG

## 2019-12-09 LAB
ALBUMIN SERPL-MCNC: 3.1 G/DL (ref 3.4–5)
ALP SERPL-CCNC: 73 U/L (ref 40–150)
ALT SERPL W P-5'-P-CCNC: 24 U/L (ref 0–50)
ANION GAP SERPL CALCULATED.3IONS-SCNC: 6 MMOL/L (ref 3–14)
AST SERPL W P-5'-P-CCNC: 12 U/L (ref 0–45)
BASOPHILS # BLD AUTO: 0 10E9/L (ref 0–0.2)
BASOPHILS NFR BLD AUTO: 0.4 %
BILIRUB SERPL-MCNC: 0.2 MG/DL (ref 0.2–1.3)
BUN SERPL-MCNC: 9 MG/DL (ref 7–30)
CALCIUM SERPL-MCNC: 8.3 MG/DL (ref 8.5–10.1)
CHLORIDE SERPL-SCNC: 113 MMOL/L (ref 94–109)
CO2 SERPL-SCNC: 23 MMOL/L (ref 20–32)
CREAT SERPL-MCNC: 0.51 MG/DL (ref 0.52–1.04)
CRP SERPL-MCNC: 6.6 MG/L (ref 0–8)
DIFFERENTIAL METHOD BLD: ABNORMAL
EOSINOPHIL # BLD AUTO: 0.2 10E9/L (ref 0–0.7)
EOSINOPHIL NFR BLD AUTO: 2.4 %
ERYTHROCYTE [DISTWIDTH] IN BLOOD BY AUTOMATED COUNT: 15.2 % (ref 10–15)
GFR SERPL CREATININE-BSD FRML MDRD: >90 ML/MIN/{1.73_M2}
GLUCOSE SERPL-MCNC: 104 MG/DL (ref 70–99)
HCT VFR BLD AUTO: 35.9 % (ref 35–47)
HGB BLD-MCNC: 11.2 G/DL (ref 11.7–15.7)
IMM GRANULOCYTES # BLD: 0 10E9/L (ref 0–0.4)
IMM GRANULOCYTES NFR BLD: 0.1 %
INR PPP: 1.45 (ref 0.86–1.14)
LYMPHOCYTES # BLD AUTO: 1.8 10E9/L (ref 0.8–5.3)
LYMPHOCYTES NFR BLD AUTO: 26.5 %
MCH RBC QN AUTO: 28.9 PG (ref 26.5–33)
MCHC RBC AUTO-ENTMCNC: 31.2 G/DL (ref 31.5–36.5)
MCV RBC AUTO: 93 FL (ref 78–100)
MONOCYTES # BLD AUTO: 0.4 10E9/L (ref 0–1.3)
MONOCYTES NFR BLD AUTO: 6 %
NEUTROPHILS # BLD AUTO: 4.4 10E9/L (ref 1.6–8.3)
NEUTROPHILS NFR BLD AUTO: 64.6 %
NRBC # BLD AUTO: 0 10*3/UL
NRBC BLD AUTO-RTO: 0 /100
PLATELET # BLD AUTO: 267 10E9/L (ref 150–450)
POTASSIUM SERPL-SCNC: 3.1 MMOL/L (ref 3.4–5.3)
PROT SERPL-MCNC: 6.2 G/DL (ref 6.8–8.8)
RBC # BLD AUTO: 3.88 10E12/L (ref 3.8–5.2)
SODIUM SERPL-SCNC: 142 MMOL/L (ref 133–144)
WBC # BLD AUTO: 6.8 10E9/L (ref 4–11)

## 2019-12-09 PROCEDURE — 86140 C-REACTIVE PROTEIN: CPT | Performed by: INTERNAL MEDICINE

## 2019-12-09 PROCEDURE — 25000132 ZZH RX MED GY IP 250 OP 250 PS 637: Performed by: INTERNAL MEDICINE

## 2019-12-09 PROCEDURE — 85610 PROTHROMBIN TIME: CPT | Performed by: INTERNAL MEDICINE

## 2019-12-09 PROCEDURE — 80053 COMPREHEN METABOLIC PANEL: CPT | Performed by: INTERNAL MEDICINE

## 2019-12-09 PROCEDURE — 71046 X-RAY EXAM CHEST 2 VIEWS: CPT

## 2019-12-09 PROCEDURE — 85025 COMPLETE CBC W/AUTO DIFF WBC: CPT | Performed by: INTERNAL MEDICINE

## 2019-12-09 RX ORDER — POTASSIUM CHLORIDE 1.5 G/1.58G
20 POWDER, FOR SOLUTION ORAL ONCE
Status: COMPLETED | OUTPATIENT
Start: 2019-12-09 | End: 2019-12-09

## 2019-12-09 RX ADMIN — POTASSIUM CHLORIDE 20 MEQ: 1.5 POWDER, FOR SOLUTION ORAL at 01:12

## 2019-12-09 ASSESSMENT — ENCOUNTER SYMPTOMS
PALPITATIONS: 0
ADENOPATHY: 0
FEVER: 0
WHEEZING: 0
ABDOMINAL PAIN: 0
SHORTNESS OF BREATH: 0
BACK PAIN: 0
DIFFICULTY URINATING: 0
VOMITING: 0
CHILLS: 0
TROUBLE SWALLOWING: 0
NAUSEA: 0
NECK PAIN: 0
LIGHT-HEADEDNESS: 0
COUGH: 1
HEADACHES: 0

## 2019-12-09 NOTE — ED NOTES
Bed: ED03  Expected date:   Expected time:   Means of arrival:   Comments:  [facility] 29 F coughing up blood, suppose to be on liquid diet but actually on regular diet

## 2019-12-09 NOTE — ED PROVIDER NOTES
History     Chief Complaint   Patient presents with     Hemoptysis     HPI  Nevin Alvarado is a 29 year old female who has a history of recent RLL pulmonary embolus admitted to the hospital 11/28-12/5 for hemoptysis. She had chest CT x 2 both demonstrating segmental and subsegmental PE. She had no DVT. She was discharged on Eliquis for 3 months. She also had recent esophageal perforation in October of this year. She had esophageal stent removed during her last hospitalization. She was to be on gradual taper back to solid diet, with full liquid diet for 7 days, followed by mechanical soft diet for 7 days, then full diet as tolerated. She states she has been eating a normal diet. Tonight she coughed up a quarter sized blob of blood tinged sputum. She denies feverm shills, chest pain, shortness of breath. She states swallowing is going OK. She has no nausea, vomiting or abdominal pain, leg pain or swelling.    She has a complex past history of swallowing esophageal foreign bodies or inserting objects into her rectum and has had numerous endoscopies. The esphageal perforation was precipitated by swallowing paper clips. She had stent in place for 2 months prior to recent removal. She has history of OCD and SIB and borderline personality disorder.    Past Medical History:   Diagnosis Date     ADD (attention deficit disorder)      Anorexia nervosa with bulimia     history of; on Topamax     Anxiety      Borderline personality disorder (H)      Depression      Depressive disorder      H/O self-harm      Lives in independent group home     due to debilitating mental illness     Migraine without aura     no known triggers; on Topamax bid and Imitrex PRN     Morbid obesity (H)      PTSD (post-traumatic stress disorder)      Rectal foreign body - Recurrent issue, self placed      Swallowed foreign body - Recurrent issue, self placed        Past Surgical History:   Procedure Laterality Date     COMBINED ESOPHAGOSCOPY,  GASTROSCOPY, DUODENOSCOPY (EGD), REPLACE ESOPHAGEAL STENT N/A 10/9/2019    Procedure: Upper Endoscopy with Suture Placement;  Surgeon: Zurdo Ramirez MD;  Location: [facility]     ESOPHAGOSCOPY, GASTROSCOPY, DUODENOSCOPY (EGD), COMBINED N/A 3/9/2017    Procedure: COMBINED ESOPHAGOSCOPY, GASTROSCOPY, DUODENOSCOPY (EGD), REMOVE FOREIGN BODY;  Surgeon: Avis Guzmán MD;  Location: [facility]     ESOPHAGOSCOPY, GASTROSCOPY, DUODENOSCOPY (EGD), COMBINED N/A 4/20/2017    Procedure: COMBINED ESOPHAGOSCOPY, GASTROSCOPY, DUODENOSCOPY (EGD), REMOVE FOREIGN BODY;  EGD removal Foregin body;  Surgeon: Lokesh Paula MD;  Location: [facility]     ESOPHAGOSCOPY, GASTROSCOPY, DUODENOSCOPY (EGD), COMBINED N/A 6/12/2017    Procedure: COMBINED ESOPHAGOSCOPY, GASTROSCOPY, DUODENOSCOPY (EGD);  COMBINED ESOPHAGOSCOPY, GASTROSCOPY, DUODENOSCOPY (EGD) [[id number]]attempted removal of foreign body;  Surgeon: Pamela Perez MD;  Location: [facility]     ESOPHAGOSCOPY, GASTROSCOPY, DUODENOSCOPY (EGD), COMBINED N/A 6/9/2017    Procedure: COMBINED ESOPHAGOSCOPY, GASTROSCOPY, DUODENOSCOPY (EGD), REMOVE FOREIGN BODY;  Esophagoscopy, Gastroscopy, Duodenoscopy, Removal of Foreign Body;  Surgeon: Dejon Marsh MD;  Location: [facility]     ESOPHAGOSCOPY, GASTROSCOPY, DUODENOSCOPY (EGD), COMBINED N/A 1/6/2018    Procedure: COMBINED ESOPHAGOSCOPY, GASTROSCOPY, DUODENOSCOPY (EGD), REMOVE FOREIGN BODY;  COMBINED ESOPHAGOSCOPY, GASTROSCOPY, DUODENOSCOPY (EGD) [by pascal net and snare with profol sedation;  Surgeon: Feliciano Emmanuel MD;  Location:  GI     ESOPHAGOSCOPY, GASTROSCOPY, DUODENOSCOPY (EGD), COMBINED N/A 3/19/2018    Procedure: COMBINED ESOPHAGOSCOPY, GASTROSCOPY, DUODENOSCOPY (EGD), REMOVE FOREIGN BODY;   Esophagodscopy, Gastroscopy, Duodenoscopy,Foreign Body Removal;  Surgeon: Brice Guzmán MD;  Location: [facility]     ESOPHAGOSCOPY, GASTROSCOPY, DUODENOSCOPY (EGD), COMBINED N/A 4/16/2018    Procedure: COMBINED  ESOPHAGOSCOPY, GASTROSCOPY, DUODENOSCOPY (EGD), REMOVE FOREIGN BODY;  Esophagogastroduodenoscopy  Foreign Body Removal X 2;  Surgeon: Royer Moise MD;  [facility]: [facility]     ESOPHAGOSCOPY, GASTROSCOPY, DUODENOSCOPY (EGD), COMBINED N/A 6/1/2018    Procedure: COMBINED ESOPHAGOSCOPY, GASTROSCOPY, DUODENOSCOPY (EGD), REMOVE FOREIGN BODY;  COMBINED ESOPHAGOSCOPY, GASTROSCOPY, DUODENOSCOPY with removal of foreign body, propofol sedation by anesthesia;  Surgeon: Brice Martinez MD;  Location:  GI     ESOPHAGOSCOPY, GASTROSCOPY, DUODENOSCOPY (EGD), COMBINED N/A 7/25/2018    Procedure: COMBINED ESOPHAGOSCOPY, GASTROSCOPY, DUODENOSCOPY (EGD), REMOVE FOREIGN BODY;;  Surgeon: Candy Castelan MD;  Location:  GI     ESOPHAGOSCOPY, GASTROSCOPY, DUODENOSCOPY (EGD), COMBINED N/A 7/28/2018    Procedure: COMBINED ESOPHAGOSCOPY, GASTROSCOPY, DUODENOSCOPY (EGD), REMOVE FOREIGN BODY;  COMBINED ESOPHAGOSCOPY, GASTROSCOPY, DUODENOSCOPY (EGD), REMOVE FOREIGN BODY;  Surgeon: Brice Guzmán MD;  [facility]     ESOPHAGOSCOPY, GASTROSCOPY, DUODENOSCOPY (EGD), COMBINED N/A 7/31/2018    Procedure: COMBINED ESOPHAGOSCOPY, GASTROSCOPY, DUODENOSCOPY (EGD);  COMBINED ESOPHAGOSCOPY, GASTROSCOPY, DUODENOSCOPY (EGD) TO REMOVE FOREIGN BODY;  Surgeon: Lokesh Paula MD;  [facility]     ESOPHAGOSCOPY, GASTROSCOPY, DUODENOSCOPY (EGD), COMBINED N/A 8/4/2018    Procedure: COMBINED ESOPHAGOSCOPY, GASTROSCOPY, DUODENOSCOPY (EGD), REMOVE FOREIGN BODY;   combined esophagoscopy, gastroscopy, duodenoscopy, REMOVE FOREIGN BODY ;  Surgeon: Lokesh Paula MD;  [facility]     ESOPHAGOSCOPY, GASTROSCOPY, DUODENOSCOPY (EGD), COMBINED N/A 10/6/2019    Procedure: ESOPHAGOGASTRODUODENOSCOPY (EGD) with fireign body removal x2, esophageal stent placement with floroscopy;  Surgeon: Timoteo Espana MD;  [facility]: [facility]     ESOPHAGOSCOPY, GASTROSCOPY, DUODENOSCOPY (EGD), COMBINED N/A 12/2/2019    Procedure:  Esophagogastroduodenoscopy with esophageal stent removal, esophogram;  Surgeon: Kailee Tena MD;  [facility]     EXAM UNDER ANESTHESIA ANUS N/A 1/10/2017    Procedure: EXAM UNDER ANESTHESIA ANUS;  Surgeon: Annmarie Haynes MD;  [facility]     EXAM UNDER ANESTHESIA RECTUM N/A 7/19/2018    Procedure: EXAM UNDER ANESTHESIA RECTUM;  EXAM UNDER ANESTHESIA, REMOVAL OF RECTAL FOREIGN BODY;  Surgeon: Annmarie Haynes MD;  Location: [facility]     HC REMOVE FECAL IMPACTION OR FB W ANESTHESIA N/A 12/18/2016    Procedure: REMOVE FECAL IMPACTION/FOREIGN BODY UNDER ANESTHESIA;  Surgeon: Soham Cano MD;  Location: [facility]     KNEE SURGERY - removed a small tissue mass from knee Right      LAPAROSCOPIC ABLATION ENDOMETRIOSIS       LAPAROTOMY EXPLORATORY N/A 1/10/2017    Procedure: LAPAROTOMY EXPLORATORY;  Surgeon: Anmnarie Haynes MD;  Location: [facility]     LAPAROTOMY EXPLORATORY  09/11/2019    Manual manipulation of bowel to remove pill bottle in rectum     lymph nodes removed from neck; benign  age 7     MAMMOPLASTY REDUCTION Bilateral      RELEASE CARPAL TUNNEL Bilateral      SIGMOIDOSCOPY FLEXIBLE N/A 1/10/2017    Procedure: SIGMOIDOSCOPY FLEXIBLE;  Surgeon: Annmarie Haynes MD;  [facility]: [facility]     SIGMOIDOSCOPY FLEXIBLE N/A 5/8/2018    Procedure: SIGMOIDOSCOPY FLEXIBLE;  flex sig with foreign body removal using snare and rattooth forcep;  Surgeon: Soham Cano MD;  Location:  GI     SIGMOIDOSCOPY FLEXIBLE N/A 2/20/2019    Procedure: Exam under anesthesia Colonoscopy with attempted  removal of rectal foreign body;  Surgeon: Estrada Chávez MD;  Location: [facility]       Family History   Problem Relation Age of Onset     Diabetes Type 2  Maternal Grandmother      Diabetes Type 2  Paternal Grandmother      Breast Cancer Paternal Grandmother      Cerebrovascular Disease Father 53     Myocardial Infarction No family hx of      Coronary Artery Disease Early Onset No family  hx of      Ovarian Cancer No family hx of      Colon Cancer No family hx of        Social History     Tobacco Use     Smoking status: Never Smoker     Smokeless tobacco: Never Used   Substance Use Topics     Alcohol use: No     Alcohol/week: 0.0 standard drinks         I have reviewed the Medications, Allergies, Past Medical and Surgical History, and Social History in the Epic system.    Review of Systems   Constitutional: Negative for chills and fever.   HENT: Negative for congestion and trouble swallowing.    Eyes: Negative for visual disturbance.   Respiratory: Positive for cough. Negative for shortness of breath and wheezing.    Cardiovascular: Negative for chest pain, palpitations and leg swelling.   Gastrointestinal: Negative for abdominal pain, nausea and vomiting.   Genitourinary: Negative for difficulty urinating.   Musculoskeletal: Negative for back pain and neck pain.   Skin: Negative for rash.   Neurological: Negative for light-headedness and headaches.   Hematological: Negative for adenopathy.       Physical Exam   BP: 122/77  Pulse: 89  Temp: 98.3  F (36.8  C)  Resp: 16  Weight: 109.8 kg (242 lb)  SpO2: 99 %      Physical Exam  Vitals signs and nursing note reviewed.   Constitutional:       Appearance: Normal appearance. She is obese.   HENT:      Head: Normocephalic.      Right Ear: External ear normal.      Left Ear: External ear normal.      Nose: Nose normal.      Mouth/Throat:      Mouth: Mucous membranes are moist.      Pharynx: No oropharyngeal exudate or posterior oropharyngeal erythema.   Eyes:      Extraocular Movements: Extraocular movements intact.      Conjunctiva/sclera: Conjunctivae normal.      Pupils: Pupils are equal, round, and reactive to light.   Neck:      Musculoskeletal: Normal range of motion.   Cardiovascular:      Rate and Rhythm: Normal rate and regular rhythm.      Heart sounds: No murmur.   Pulmonary:      Effort: Pulmonary effort is normal.      Breath sounds: Normal  breath sounds. No wheezing or rales.   Abdominal:      Palpations: Abdomen is soft.      Tenderness: There is no abdominal tenderness. There is no guarding.   Musculoskeletal:      Right lower leg: No edema.      Left lower leg: No edema.   Lymphadenopathy:      Cervical: No cervical adenopathy.   Skin:     General: Skin is warm and dry.   Neurological:      General: No focal deficit present.      Mental Status: She is alert.   Psychiatric:         Mood and Affect: Mood normal.         Behavior: Behavior normal.         ED Course        Procedures          Labs/Imaging    Results for orders placed or performed during the hospital encounter of 12/08/19 (from the past 24 hour(s))   CBC with platelets differential   Result Value Ref Range    WBC 6.8 4.0 - 11.0 10e9/L    RBC Count 3.88 3.8 - 5.2 10e12/L    Hemoglobin 11.2 (L) 11.7 - 15.7 g/dL    Hematocrit 35.9 35.0 - 47.0 %    MCV 93 78 - 100 fl    MCH 28.9 26.5 - 33.0 pg    MCHC 31.2 (L) 31.5 - 36.5 g/dL    RDW 15.2 (H) 10.0 - 15.0 %    Platelet Count 267 150 - 450 10e9/L    Diff Method Automated Method     % Neutrophils 64.6 %    % Lymphocytes 26.5 %    % Monocytes 6.0 %    % Eosinophils 2.4 %    % Basophils 0.4 %    % Immature Granulocytes 0.1 %    Nucleated RBCs 0 0 /100    Absolute Neutrophil 4.4 1.6 - 8.3 10e9/L    Absolute Lymphocytes 1.8 0.8 - 5.3 10e9/L    Absolute Monocytes 0.4 0.0 - 1.3 10e9/L    Absolute Eosinophils 0.2 0.0 - 0.7 10e9/L    Absolute Basophils 0.0 0.0 - 0.2 10e9/L    Abs Immature Granulocytes 0.0 0 - 0.4 10e9/L    Absolute Nucleated RBC 0.0    INR   Result Value Ref Range    INR 1.45 (H) 0.86 - 1.14   Comprehensive metabolic panel   Result Value Ref Range    Sodium 142 133 - 144 mmol/L    Potassium 3.1 (L) 3.4 - 5.3 mmol/L    Chloride 113 (H) 94 - 109 mmol/L    Carbon Dioxide 23 20 - 32 mmol/L    Anion Gap 6 3 - 14 mmol/L    Glucose 104 (H) 70 - 99 mg/dL    Urea Nitrogen 9 7 - 30 mg/dL    Creatinine 0.51 (L) 0.52 - 1.04 mg/dL    GFR Estimate  >90 >60 mL/min/[1.73_m2]    GFR Estimate If Black >90 >60 mL/min/[1.73_m2]    Calcium 8.3 (L) 8.5 - 10.1 mg/dL    Bilirubin Total 0.2 0.2 - 1.3 mg/dL    Albumin 3.1 (L) 3.4 - 5.0 g/dL    Protein Total 6.2 (L) 6.8 - 8.8 g/dL    Alkaline Phosphatase 73 40 - 150 U/L    ALT 24 0 - 50 U/L    AST 12 0 - 45 U/L   CRP inflammation   Result Value Ref Range    CRP Inflammation 6.6 0.0 - 8.0 mg/L   XR Chest 2 Views    Narrative    Preliminary Report - The following report is a preliminary  interpretation.      Impression    Impression: No acute cardiopulmonary abnormalities.         Assessments & Plan (with Medical Decision Making)   Impression:  Young female with known RLL PE on Eliquis and history of esophageal perforation treated with stent 10/9/18. She had hemoptysis at the time of diagnosis of the PE. She again had minimal hemoptysis tonight. She has a known residual metallic FB at the mid esophagus on XR. There is no acute infiltrate in the lungs. She is hemodynamically stable, afebrile and oxygenating normally.CBC and CRP are normal. Electrolytes and LFTs are normal other than mild hypokalemia. She has been swallowing well at home. She will be encouraged to return to full liquid or mechanical soft diet and should continue her PPI. Some ongoing hemoptysis is not unexpected with known PE on Eliquis. I think she can be discharge to ongoing current cares.    I have reviewed the nursing notes.    I have reviewed the findings, diagnosis, plan and need for follow up with the patient.    New Prescriptions    No medications on file       Final diagnoses:   Hemoptysis   Acute pulmonary embolism without acute cor pulmonale, unspecified pulmonary embolism type (H)       12/8/2019   [facility], [place], EMERGENCY DEPARTMENT     Gustavo Quintero MD  12/09/19 0101

## 2019-12-09 NOTE — DISCHARGE INSTRUCTIONS
Return to full liquid or mechanical soft diet.   Continue your current medications.  You may continue to cough up small amounts of blood as the damaged lung from the blood clot heals.   Please return for fever, shortness of breath or coughing up large amounts of blood.  Follow up with Dr Knight.

## 2019-12-09 NOTE — ED TRIAGE NOTES
"Pt presents to the ER via ambulance from home with complaints of cough with a small amount of \"pinkish red\" blood over the last 4 hours. Pt recently DX with a PE and has recent HX of perforated esophogus. Pt reports she is suppose to be on a full liquid diet due to that, but has been eating a regular diet. Denies SOB  "

## 2019-12-13 ENCOUNTER — HOSPITAL ENCOUNTER (EMERGENCY)
Facility: CLINIC | Age: 29
Discharge: HOME | End: 2019-12-14
Attending: EMERGENCY MEDICINE | Admitting: INTERNAL MEDICINE
Payer: COMMERCIAL

## 2019-12-13 ENCOUNTER — APPOINTMENT (OUTPATIENT)
Dept: GENERAL RADIOLOGY | Facility: CLINIC | Age: 29
End: 2019-12-13
Attending: EMERGENCY MEDICINE
Payer: COMMERCIAL

## 2019-12-13 ENCOUNTER — ANESTHESIA (OUTPATIENT)
Dept: SURGERY | Facility: CLINIC | Age: 29
End: 2019-12-13
Payer: COMMERCIAL

## 2019-12-13 ENCOUNTER — ANESTHESIA EVENT (OUTPATIENT)
Dept: SURGERY | Facility: CLINIC | Age: 29
End: 2019-12-13
Payer: COMMERCIAL

## 2019-12-13 DIAGNOSIS — F41.9 ANXIETY: ICD-10-CM

## 2019-12-13 DIAGNOSIS — F33.1 MAJOR DEPRESSIVE DISORDER, RECURRENT EPISODE, MODERATE (H): ICD-10-CM

## 2019-12-13 DIAGNOSIS — R45.89 AT HIGH RISK FOR SELF HARM: ICD-10-CM

## 2019-12-13 DIAGNOSIS — F60.3 EXPLOSIVE PERSONALITY DISORDER (H): ICD-10-CM

## 2019-12-13 DIAGNOSIS — Z79.01 LONG TERM (CURRENT) USE OF ANTICOAGULANTS: ICD-10-CM

## 2019-12-13 DIAGNOSIS — T18.9XXA SWALLOWED FOREIGN BODY, INITIAL ENCOUNTER: ICD-10-CM

## 2019-12-13 DIAGNOSIS — T18.2XXA FOREIGN BODY IN STOMACH, INITIAL ENCOUNTER: ICD-10-CM

## 2019-12-13 LAB
ANION GAP SERPL CALCULATED.3IONS-SCNC: 7 MMOL/L (ref 3–14)
BASOPHILS # BLD AUTO: 0 10E9/L (ref 0–0.2)
BASOPHILS NFR BLD AUTO: 0.4 %
BUN SERPL-MCNC: 12 MG/DL (ref 7–30)
CALCIUM SERPL-MCNC: 8.9 MG/DL (ref 8.5–10.1)
CHLORIDE SERPL-SCNC: 112 MMOL/L (ref 94–109)
CO2 SERPL-SCNC: 22 MMOL/L (ref 20–32)
CREAT SERPL-MCNC: 0.55 MG/DL (ref 0.52–1.04)
DIFFERENTIAL METHOD BLD: ABNORMAL
EOSINOPHIL # BLD AUTO: 0.1 10E9/L (ref 0–0.7)
EOSINOPHIL NFR BLD AUTO: 1.4 %
ERYTHROCYTE [DISTWIDTH] IN BLOOD BY AUTOMATED COUNT: 14.7 % (ref 10–15)
GFR SERPL CREATININE-BSD FRML MDRD: >90 ML/MIN/{1.73_M2}
GLUCOSE SERPL-MCNC: 104 MG/DL (ref 70–99)
HCT VFR BLD AUTO: 36.5 % (ref 35–47)
HGB BLD-MCNC: 11.5 G/DL (ref 11.7–15.7)
IMM GRANULOCYTES # BLD: 0 10E9/L (ref 0–0.4)
IMM GRANULOCYTES NFR BLD: 0.2 %
LYMPHOCYTES # BLD AUTO: 1.8 10E9/L (ref 0.8–5.3)
LYMPHOCYTES NFR BLD AUTO: 18.9 %
MCH RBC QN AUTO: 28.8 PG (ref 26.5–33)
MCHC RBC AUTO-ENTMCNC: 31.5 G/DL (ref 31.5–36.5)
MCV RBC AUTO: 91 FL (ref 78–100)
MONOCYTES # BLD AUTO: 0.6 10E9/L (ref 0–1.3)
MONOCYTES NFR BLD AUTO: 5.9 %
NEUTROPHILS # BLD AUTO: 6.9 10E9/L (ref 1.6–8.3)
NEUTROPHILS NFR BLD AUTO: 73.2 %
NRBC # BLD AUTO: 0 10*3/UL
NRBC BLD AUTO-RTO: 0 /100
PLATELET # BLD AUTO: 294 10E9/L (ref 150–450)
POTASSIUM SERPL-SCNC: 3.8 MMOL/L (ref 3.4–5.3)
RADIOLOGIST FLAGS: ABNORMAL
RBC # BLD AUTO: 4 10E12/L (ref 3.8–5.2)
SODIUM SERPL-SCNC: 141 MMOL/L (ref 133–144)
WBC # BLD AUTO: 9.5 10E9/L (ref 4–11)

## 2019-12-13 PROCEDURE — 25000128 H RX IP 250 OP 636: Performed by: NURSE ANESTHETIST, CERTIFIED REGISTERED

## 2019-12-13 PROCEDURE — 71000015 ZZH RECOVERY PHASE 1 LEVEL 2 EA ADDTL HR: Performed by: INTERNAL MEDICINE

## 2019-12-13 PROCEDURE — 80048 BASIC METABOLIC PNL TOTAL CA: CPT | Performed by: EMERGENCY MEDICINE

## 2019-12-13 PROCEDURE — 74018 RADEX ABDOMEN 1 VIEW: CPT

## 2019-12-13 PROCEDURE — 71000014 ZZH RECOVERY PHASE 1 LEVEL 2 FIRST HR: Performed by: INTERNAL MEDICINE

## 2019-12-13 PROCEDURE — 25800030 ZZH RX IP 258 OP 636: Performed by: NURSE ANESTHETIST, CERTIFIED REGISTERED

## 2019-12-13 PROCEDURE — 37000009 ZZH ANESTHESIA TECHNICAL FEE, EACH ADDTL 15 MIN: Performed by: INTERNAL MEDICINE

## 2019-12-13 PROCEDURE — 71045 X-RAY EXAM CHEST 1 VIEW: CPT

## 2019-12-13 PROCEDURE — 85025 COMPLETE CBC W/AUTO DIFF WBC: CPT | Performed by: EMERGENCY MEDICINE

## 2019-12-13 PROCEDURE — 36000053 ZZH SURGERY LEVEL 2 EA 15 ADDTL MIN - UMMC: Performed by: INTERNAL MEDICINE

## 2019-12-13 PROCEDURE — 36000051 ZZH SURGERY LEVEL 2 1ST 30 MIN - UMMC: Performed by: INTERNAL MEDICINE

## 2019-12-13 PROCEDURE — 25000566 ZZH SEVOFLURANE, EA 15 MIN: Performed by: INTERNAL MEDICINE

## 2019-12-13 PROCEDURE — 25000125 ZZHC RX 250: Performed by: NURSE ANESTHETIST, CERTIFIED REGISTERED

## 2019-12-13 PROCEDURE — 37000008 ZZH ANESTHESIA TECHNICAL FEE, 1ST 30 MIN: Performed by: INTERNAL MEDICINE

## 2019-12-13 PROCEDURE — 25000128 H RX IP 250 OP 636: Performed by: EMERGENCY MEDICINE

## 2019-12-13 PROCEDURE — 25000128 H RX IP 250 OP 636: Performed by: ANESTHESIOLOGY

## 2019-12-13 RX ORDER — FLUMAZENIL 0.1 MG/ML
0.2 INJECTION, SOLUTION INTRAVENOUS
Status: CANCELLED | OUTPATIENT
Start: 2019-12-13 | End: 2019-12-14

## 2019-12-13 RX ORDER — SODIUM CHLORIDE, SODIUM LACTATE, POTASSIUM CHLORIDE, CALCIUM CHLORIDE 600; 310; 30; 20 MG/100ML; MG/100ML; MG/100ML; MG/100ML
INJECTION, SOLUTION INTRAVENOUS CONTINUOUS PRN
Status: DISCONTINUED | OUTPATIENT
Start: 2019-12-13 | End: 2019-12-13

## 2019-12-13 RX ORDER — LIDOCAINE HYDROCHLORIDE 20 MG/ML
INJECTION, SOLUTION INFILTRATION; PERINEURAL PRN
Status: DISCONTINUED | OUTPATIENT
Start: 2019-12-13 | End: 2019-12-13

## 2019-12-13 RX ORDER — DIPHENHYDRAMINE HYDROCHLORIDE 50 MG/ML
25 INJECTION INTRAMUSCULAR; INTRAVENOUS EVERY 6 HOURS PRN
Status: DISCONTINUED | OUTPATIENT
Start: 2019-12-13 | End: 2019-12-14 | Stop reason: HOSPADM

## 2019-12-13 RX ORDER — ONDANSETRON 4 MG/1
4 TABLET, ORALLY DISINTEGRATING ORAL EVERY 30 MIN PRN
Status: DISCONTINUED | OUTPATIENT
Start: 2019-12-13 | End: 2019-12-14 | Stop reason: HOSPADM

## 2019-12-13 RX ORDER — DEXAMETHASONE SODIUM PHOSPHATE 4 MG/ML
INJECTION, SOLUTION INTRA-ARTICULAR; INTRALESIONAL; INTRAMUSCULAR; INTRAVENOUS; SOFT TISSUE PRN
Status: DISCONTINUED | OUTPATIENT
Start: 2019-12-13 | End: 2019-12-13

## 2019-12-13 RX ORDER — FENTANYL CITRATE 50 UG/ML
25-50 INJECTION, SOLUTION INTRAMUSCULAR; INTRAVENOUS
Status: DISCONTINUED | OUTPATIENT
Start: 2019-12-13 | End: 2019-12-14 | Stop reason: HOSPADM

## 2019-12-13 RX ORDER — NALOXONE HYDROCHLORIDE 0.4 MG/ML
.1-.4 INJECTION, SOLUTION INTRAMUSCULAR; INTRAVENOUS; SUBCUTANEOUS
Status: CANCELLED | OUTPATIENT
Start: 2019-12-13 | End: 2019-12-14

## 2019-12-13 RX ORDER — FENTANYL CITRATE 50 UG/ML
INJECTION, SOLUTION INTRAMUSCULAR; INTRAVENOUS PRN
Status: DISCONTINUED | OUTPATIENT
Start: 2019-12-13 | End: 2019-12-13

## 2019-12-13 RX ORDER — HYDROMORPHONE HYDROCHLORIDE 1 MG/ML
.3-.5 INJECTION, SOLUTION INTRAMUSCULAR; INTRAVENOUS; SUBCUTANEOUS EVERY 5 MIN PRN
Status: DISCONTINUED | OUTPATIENT
Start: 2019-12-13 | End: 2019-12-14 | Stop reason: HOSPADM

## 2019-12-13 RX ORDER — OXYCODONE HYDROCHLORIDE 5 MG/1
5 TABLET ORAL EVERY 4 HOURS PRN
Status: CANCELLED | OUTPATIENT
Start: 2019-12-13

## 2019-12-13 RX ORDER — PROPOFOL 10 MG/ML
INJECTION, EMULSION INTRAVENOUS PRN
Status: DISCONTINUED | OUTPATIENT
Start: 2019-12-13 | End: 2019-12-13

## 2019-12-13 RX ORDER — SODIUM CHLORIDE, SODIUM LACTATE, POTASSIUM CHLORIDE, CALCIUM CHLORIDE 600; 310; 30; 20 MG/100ML; MG/100ML; MG/100ML; MG/100ML
INJECTION, SOLUTION INTRAVENOUS CONTINUOUS
Status: DISCONTINUED | OUTPATIENT
Start: 2019-12-13 | End: 2019-12-14 | Stop reason: HOSPADM

## 2019-12-13 RX ORDER — ONDANSETRON 2 MG/ML
INJECTION INTRAMUSCULAR; INTRAVENOUS PRN
Status: DISCONTINUED | OUTPATIENT
Start: 2019-12-13 | End: 2019-12-13

## 2019-12-13 RX ORDER — ONDANSETRON 2 MG/ML
4 INJECTION INTRAMUSCULAR; INTRAVENOUS EVERY 30 MIN PRN
Status: DISCONTINUED | OUTPATIENT
Start: 2019-12-13 | End: 2019-12-14 | Stop reason: HOSPADM

## 2019-12-13 RX ADMIN — HYDROMORPHONE HYDROCHLORIDE 1 MG: 1 INJECTION, SOLUTION INTRAMUSCULAR; INTRAVENOUS; SUBCUTANEOUS at 21:02

## 2019-12-13 RX ADMIN — FENTANYL CITRATE 100 MCG: 50 INJECTION, SOLUTION INTRAMUSCULAR; INTRAVENOUS at 22:11

## 2019-12-13 RX ADMIN — DIPHENHYDRAMINE HYDROCHLORIDE 25 MG: 50 INJECTION, SOLUTION INTRAMUSCULAR; INTRAVENOUS at 23:45

## 2019-12-13 RX ADMIN — LIDOCAINE HYDROCHLORIDE 100 MG: 20 INJECTION, SOLUTION INFILTRATION; PERINEURAL at 22:11

## 2019-12-13 RX ADMIN — FENTANYL CITRATE 50 MCG: 50 INJECTION, SOLUTION INTRAMUSCULAR; INTRAVENOUS at 22:48

## 2019-12-13 RX ADMIN — DEXAMETHASONE SODIUM PHOSPHATE 8 MG: 4 INJECTION, SOLUTION INTRA-ARTICULAR; INTRALESIONAL; INTRAMUSCULAR; INTRAVENOUS; SOFT TISSUE at 22:16

## 2019-12-13 RX ADMIN — MIDAZOLAM 2 MG: 1 INJECTION INTRAMUSCULAR; INTRAVENOUS at 22:01

## 2019-12-13 RX ADMIN — FENTANYL CITRATE 25 MCG: 50 INJECTION, SOLUTION INTRAMUSCULAR; INTRAVENOUS at 23:04

## 2019-12-13 RX ADMIN — PROPOFOL 200 MG: 10 INJECTION, EMULSION INTRAVENOUS at 22:11

## 2019-12-13 RX ADMIN — Medication 140 MG: at 22:11

## 2019-12-13 RX ADMIN — FENTANYL CITRATE 25 MCG: 50 INJECTION, SOLUTION INTRAMUSCULAR; INTRAVENOUS at 23:19

## 2019-12-13 RX ADMIN — SODIUM CHLORIDE, POTASSIUM CHLORIDE, SODIUM LACTATE AND CALCIUM CHLORIDE: 600; 310; 30; 20 INJECTION, SOLUTION INTRAVENOUS at 21:48

## 2019-12-13 RX ADMIN — ONDANSETRON 4 MG: 2 INJECTION INTRAMUSCULAR; INTRAVENOUS at 22:16

## 2019-12-13 RX ADMIN — FENTANYL CITRATE 50 MCG: 50 INJECTION, SOLUTION INTRAMUSCULAR; INTRAVENOUS at 23:10

## 2019-12-13 RX ADMIN — HYDROMORPHONE HYDROCHLORIDE 0.5 MG: 1 INJECTION, SOLUTION INTRAMUSCULAR; INTRAVENOUS; SUBCUTANEOUS at 23:34

## 2019-12-13 ASSESSMENT — ENCOUNTER SYMPTOMS
SORE THROAT: 1
ABDOMINAL PAIN: 1

## 2019-12-14 VITALS
HEART RATE: 89 BPM | DIASTOLIC BLOOD PRESSURE: 66 MMHG | BODY MASS INDEX: 45.12 KG/M2 | TEMPERATURE: 98.6 F | SYSTOLIC BLOOD PRESSURE: 152 MMHG | WEIGHT: 246.69 LBS | OXYGEN SATURATION: 96 % | RESPIRATION RATE: 18 BRPM

## 2019-12-14 PROCEDURE — 99285 EMERGENCY DEPT VISIT HI MDM: CPT | Mod: 25 | Performed by: EMERGENCY MEDICINE

## 2019-12-14 PROCEDURE — 25000132 ZZH RX MED GY IP 250 OP 250 PS 637: Performed by: EMERGENCY MEDICINE

## 2019-12-14 PROCEDURE — 96374 THER/PROPH/DIAG INJ IV PUSH: CPT | Performed by: EMERGENCY MEDICINE

## 2019-12-14 PROCEDURE — 25000128 H RX IP 250 OP 636: Performed by: EMERGENCY MEDICINE

## 2019-12-14 PROCEDURE — 99285 EMERGENCY DEPT VISIT HI MDM: CPT | Mod: Z6 | Performed by: EMERGENCY MEDICINE

## 2019-12-14 PROCEDURE — 25000128 H RX IP 250 OP 636: Performed by: ANESTHESIOLOGY

## 2019-12-14 PROCEDURE — 90791 PSYCH DIAGNOSTIC EVALUATION: CPT

## 2019-12-14 PROCEDURE — 25000132 ZZH RX MED GY IP 250 OP 250 PS 637: Performed by: FAMILY MEDICINE

## 2019-12-14 RX ORDER — HYDROXYZINE HYDROCHLORIDE 50 MG/1
50 TABLET, FILM COATED ORAL ONCE
Status: DISCONTINUED | OUTPATIENT
Start: 2019-12-14 | End: 2019-12-14

## 2019-12-14 RX ORDER — DESVENLAFAXINE 50 MG/1
100 TABLET, FILM COATED, EXTENDED RELEASE ORAL ONCE
Status: COMPLETED | OUTPATIENT
Start: 2019-12-14 | End: 2019-12-14

## 2019-12-14 RX ORDER — BUSPIRONE HYDROCHLORIDE 10 MG/1
10 TABLET ORAL ONCE
Status: COMPLETED | OUTPATIENT
Start: 2019-12-14 | End: 2019-12-14

## 2019-12-14 RX ORDER — HYDROXYZINE HYDROCHLORIDE 25 MG/1
25 TABLET, FILM COATED ORAL ONCE
Status: COMPLETED | OUTPATIENT
Start: 2019-12-14 | End: 2019-12-14

## 2019-12-14 RX ORDER — DIPHENHYDRAMINE HYDROCHLORIDE 50 MG/ML
25 INJECTION INTRAMUSCULAR; INTRAVENOUS ONCE
Status: DISCONTINUED | OUTPATIENT
Start: 2019-12-14 | End: 2019-12-14 | Stop reason: HOSPADM

## 2019-12-14 RX ORDER — HEPARIN SODIUM (PORCINE) LOCK FLUSH IV SOLN 100 UNIT/ML 100 UNIT/ML
5 SOLUTION INTRAVENOUS ONCE
Status: COMPLETED | OUTPATIENT
Start: 2019-12-14 | End: 2019-12-14

## 2019-12-14 RX ORDER — CLONIDINE HYDROCHLORIDE 0.1 MG/1
0.1 TABLET ORAL ONCE
Status: COMPLETED | OUTPATIENT
Start: 2019-12-14 | End: 2019-12-14

## 2019-12-14 RX ORDER — GABAPENTIN 600 MG/1
600 TABLET ORAL 3 TIMES DAILY
Status: DISCONTINUED | OUTPATIENT
Start: 2019-12-14 | End: 2019-12-14 | Stop reason: HOSPADM

## 2019-12-14 RX ORDER — HEPARIN SODIUM (PORCINE) LOCK FLUSH IV SOLN 100 UNIT/ML 100 UNIT/ML
5 SOLUTION INTRAVENOUS
Status: DISCONTINUED | OUTPATIENT
Start: 2019-12-14 | End: 2019-12-14

## 2019-12-14 RX ORDER — VITAMIN B COMPLEX
50 TABLET ORAL DAILY
Status: DISCONTINUED | OUTPATIENT
Start: 2019-12-14 | End: 2019-12-14 | Stop reason: HOSPADM

## 2019-12-14 RX ORDER — GABAPENTIN 600 MG/1
600 TABLET ORAL ONCE
Status: COMPLETED | OUTPATIENT
Start: 2019-12-14 | End: 2019-12-14

## 2019-12-14 RX ORDER — GABAPENTIN 600 MG/1
600 TABLET ORAL ONCE
Status: DISCONTINUED | OUTPATIENT
Start: 2019-12-14 | End: 2019-12-14

## 2019-12-14 RX ORDER — DIPHENHYDRAMINE HCL 50 MG
50 CAPSULE ORAL EVERY 6 HOURS PRN
Status: DISCONTINUED | OUTPATIENT
Start: 2019-12-14 | End: 2019-12-14 | Stop reason: HOSPADM

## 2019-12-14 RX ADMIN — HEPARIN 5 ML: 100 SYRINGE at 04:53

## 2019-12-14 RX ADMIN — HYDROMORPHONE HYDROCHLORIDE 0.5 MG: 1 INJECTION, SOLUTION INTRAMUSCULAR; INTRAVENOUS; SUBCUTANEOUS at 00:31

## 2019-12-14 RX ADMIN — HYDROMORPHONE HYDROCHLORIDE 0.5 MG: 1 INJECTION, SOLUTION INTRAMUSCULAR; INTRAVENOUS; SUBCUTANEOUS at 00:15

## 2019-12-14 RX ADMIN — CLONIDINE HYDROCHLORIDE 0.1 MG: 0.1 TABLET ORAL at 07:58

## 2019-12-14 RX ADMIN — HYDROXYZINE HYDROCHLORIDE 25 MG: 25 TABLET, FILM COATED ORAL at 03:16

## 2019-12-14 RX ADMIN — DIPHENHYDRAMINE HYDROCHLORIDE 25 MG: 50 INJECTION, SOLUTION INTRAMUSCULAR; INTRAVENOUS at 00:29

## 2019-12-14 RX ADMIN — DIPHENHYDRAMINE HYDROCHLORIDE 50 MG: 50 CAPSULE ORAL at 08:19

## 2019-12-14 RX ADMIN — APIXABAN 5 MG: 2.5 TABLET, FILM COATED ORAL at 07:55

## 2019-12-14 RX ADMIN — GABAPENTIN 600 MG: 600 TABLET, FILM COATED ORAL at 07:53

## 2019-12-14 RX ADMIN — DESVENLAFAXINE SUCCINATE 100 MG: 50 TABLET, EXTENDED RELEASE ORAL at 07:51

## 2019-12-14 RX ADMIN — GABAPENTIN 600 MG: 600 TABLET, FILM COATED ORAL at 14:04

## 2019-12-14 RX ADMIN — MELATONIN 50 MCG: at 07:54

## 2019-12-14 RX ADMIN — BUSPIRONE HYDROCHLORIDE 10 MG: 10 TABLET ORAL at 07:53

## 2019-12-14 RX ADMIN — HYDROXYZINE HYDROCHLORIDE 25 MG: 25 TABLET, FILM COATED ORAL at 04:51

## 2019-12-14 NOTE — PHARMACY-ADMISSION MEDICATION HISTORY
Admission medication history for the December 14, 2019 admission is complete.     Interview Sources:  Patient, Discharge note from 12/5/2019    Reliability of Source: Good    Medication Adherence: Good    Current Outpatient Pharmacy: [facility]    Changes made to PTA medication list (reason)  Added: None  Deleted: Omeprazole 2mg/mL suspension; Take 10mLs by mouth every morning (not taking per patient)  Ondansetron 4mg ODT; Take 1 tablet by mouth every 6 hours as needed (patient reports only taking 8mg ODTs)  Potassium chloride 20mEq packet; Take 20mEq by mouth twice daily (not taking per patient)  Changed: None    Additional medication history information:   Patient was a good historian of her medication history and reports good adherence.  Patient has finished the week of 10mg twice daily of abixaban and is currently taking 5mg twice daily. Today is day 2 of her 23 day course.  Patient is very particular about what times she wants to receive her meds and has been frustrated during previous admissions when she got them at a different time. Preferred time listed below:   - The following twice daily medications taken at 8AM and 8PM: apixaban, bispirone, clonidine,    - Morning medications taken at 8AM: cholecalciferol, desvenlafaxine,    - Evening medications taken at 8PM: lurasidone, topiramate   - Gabapentin taken at 8AM, 2PM, 8PM   - Metformin taken at 5PM   - Sucralfate taken at 8AM, 12PM, 5PM, and 8PM    Prior to Admission Medication List:  Prior to Admission medications    Medication Sig Last Dose Taking? Auth Provider   acetaminophen (TYLENOL) 32 mg/mL liquid Take 31.25 mLs (1,000 mg) by mouth every 6 hours as needed for fever or pain Unknown Yes Dominga Red MD   albuterol (PROAIR HFA) 108 (90 Base) MCG/ACT inhaler Inhale 2 puffs into the lungs 4 times daily as needed  Unknown Yes Reported, Patient   alum & mag hydroxide-simethicone (MYLANTA/MAALOX) 200-200-20 MG/5ML SUSP suspension Take 30 mLs by mouth every 6  hours as needed for indigestion Unknown Yes Unknown, Entered By History   apixaban ANTICOAGULANT (ELIQUIS STARTER PACK) 5 MG tablet Take 2 tablets (10 mg) by mouth 2 times daily for 7 days, THEN 1 tablet (5 mg) 2 times daily for 23 days. 12/14/2019 at 8AM Yes Asher De Jesus MD   busPIRone (BUSPAR) 10 MG tablet Take 1 tablet (10 mg) by mouth twice daily 12/14/2019 at 8AM Yes Unknown, Entered By History   calcium carbonate (TUMS) 500 MG chewable tablet Take 1 chew tab by mouth 3 times daily as needed  Unknown Yes Unknown, Entered By History   cloNIDine (CATAPRES) 0.1 MG tablet Take 0.1 mg by mouth 2 times daily  12/14/2019 at 8AM Yes Reported, Patient   desvenlafaxine (PRISTIQ) 100 MG 24 hr tablet Take 1 tablet (100 mg) by mouth every morning 12/14/2019 at 8AM Yes Reported, Patient   diphenhydrAMINE (BENADRYL) 25 MG capsule Take 1-2 capsules (25-50 mg) by mouth every 6 hours as needed for itching or sleep Unknown Yes Unknown, Entered By History   docosanol (ABREVA) 10 % CREA cream Apply to lip 5 times a day as soon as symptoms begin, do not use for more than 10 days. Unknown Yes Unknown, Entered By History   gabapentin (NEURONTIN) 600 MG tablet Take 600 mg by mouth 3 times daily  12/14/2019 at 8AM Yes Reported, Patient   levonorgestrel (MIRENA) 20 MCG/24HR IUD 1 each by Intrauterine route once  Yes Unknown, Entered By History   lidocaine (XYLOCAINE) 2 % solution Swish and spit 15 mLs in mouth every 6 hours as needed (soar throat) Unknown Yes Dominga Red MD   lurasidone (LATUDA) 60 MG TABS tablet Take 1 tablet (60 mg) by mouth at bedtime 12/13/2019 at 8PM Yes Reported, Patient   metFORMIN (GLUCOPHAGE-XR) 500 MG 24 hr tablet Take 1 tablet (500 mg) by mouth daily 12/13/2019 at 5PM Yes Unknown, Entered By History   ondansetron (ZOFRAN-ODT) 8 MG ODT tab Take 1 tablet (8 mg) by mouth every 6 hours as needed for nausea or vomiting Past Week at Unknown time Yes Jaxon Flores PA-C   oxyCODONE (ROXICODONE) 5 MG  tablet Take 5 mg by mouth every 4 hours as needed (pain) (patient rarely uses) Past Week at Unknown time Yes Unknown, Entered By History   simethicone (MYLICON) 80 MG chewable tablet Take 1 tablet (80 mg) by mouth every 6 hours as needed for flatulence or cramping (after meals) Unknown Yes Dominga Red MD   sucralfate (CARAFATE) 1 GM/10ML suspension Take 10 mLs (1 g) by mouth 4 times daily 12/14/2019 at 8AM Yes Margie Razo MD   topiramate (TOPAMAX) 100 MG tablet Take 1 tablet (100 mg) by mouth daily at bedtime 12/13/2019 at 8PM Yes Unknown, Entered By History   vitamin D3 (CHOLECALCIFEROL) 2000 units (50 mcg) tablet Take 1 tablet (2,000 units) by mouth daily 12/14/2019 at 8AM Yes Unknown, Entered By History       Time spent: 20 minutes    Medication history completed by:   Pablo De La Rosa, Pharmacy Intern

## 2019-12-14 NOTE — ED PROVIDER NOTES
[place] EMERGENCY DEPARTMENT ([facility])  December 13, 2019    History     Chief Complaint   Patient presents with     Swallowed Foreign Body     HPI  Nevin Alvarado is a 29 year old female with a history of borderline personality disorder, depression, PTSD, anxiety, multiple intentional and unintentional overdoses, multiple foreign body ingestions/insertion, with esophageal perforation status post esophageal stent (10/9/2019) who presents to the ED for evaluation after swallowing a [place]iff[place] tree ornament . Patient reports she started having thoughts of swallowing something earlier today and called the crisis line, and she tried to distract herself, but she ended up swallowing the ornament  about an hour prior to arrival (around 6:30 PM). She states she did not eat anything afterwards. She complains her stomach and throat hurt. Patient reports she did not want to harm herself, but notes she has had several lifestyle changes and swallowing objects is a coping mechanism. She states she last swallowed a foreign object in October, at which time she perforated her esophagus. Of note, patient reports she is on Eliquis as she was diagnosed with a PE on Thanksgiving (11/28/19).    PAST MEDICAL HISTORY  Past Medical History:   Diagnosis Date     ADD (attention deficit disorder)      Anorexia nervosa with bulimia     history of; on Topamax     Anxiety      Borderline personality disorder (H)      Depression      Depressive disorder      H/O self-harm      Lives in independent group home     due to debilitating mental illness     Migraine without aura     no known triggers; on Topamax bid and Imitrex PRN     Morbid obesity (H)      PTSD (post-traumatic stress disorder)      Rectal foreign body - Recurrent issue, self placed      Swallowed foreign body - Recurrent issue, self placed      PAST SURGICAL HISTORY  Past Surgical History:   Procedure Laterality Date     COMBINED ESOPHAGOSCOPY,  GASTROSCOPY, DUODENOSCOPY (EGD), REPLACE ESOPHAGEAL STENT N/A 10/9/2019    Procedure: Upper Endoscopy with Suture Placement;  Surgeon: Zurdo Ramirez MD;  Location: [facility] OR     ESOPHAGOSCOPY, GASTROSCOPY, DUODENOSCOPY (EGD), COMBINED N/A 3/9/2017    Procedure: COMBINED ESOPHAGOSCOPY, GASTROSCOPY, DUODENOSCOPY (EGD), REMOVE FOREIGN BODY;  Surgeon: Avis Guzmán MD;  Location: [facility]     ESOPHAGOSCOPY, GASTROSCOPY, DUODENOSCOPY (EGD), COMBINED N/A 4/20/2017    Procedure: COMBINED ESOPHAGOSCOPY, GASTROSCOPY, DUODENOSCOPY (EGD), REMOVE FOREIGN BODY;  EGD removal Foregin body;  Surgeon: Lokesh Paula MD;  Location: [facility]     ESOPHAGOSCOPY, GASTROSCOPY, DUODENOSCOPY (EGD), COMBINED N/A 6/12/2017    Procedure: COMBINED ESOPHAGOSCOPY, GASTROSCOPY, DUODENOSCOPY (EGD);  COMBINED ESOPHAGOSCOPY, GASTROSCOPY, DUODENOSCOPY (EGD) [[id number]]attempted removal of foreign body;  Surgeon: Pamela Perez MD;  Location: [facility]     ESOPHAGOSCOPY, GASTROSCOPY, DUODENOSCOPY (EGD), COMBINED N/A 6/9/2017    Procedure: COMBINED ESOPHAGOSCOPY, GASTROSCOPY, DUODENOSCOPY (EGD), REMOVE FOREIGN BODY;  Esophagoscopy, Gastroscopy, Duodenoscopy, Removal of Foreign Body;  Surgeon: Dejon Marsh MD;  Location: [facility]     ESOPHAGOSCOPY, GASTROSCOPY, DUODENOSCOPY (EGD), COMBINED N/A 1/6/2018    Procedure: COMBINED ESOPHAGOSCOPY, GASTROSCOPY, DUODENOSCOPY (EGD), REMOVE FOREIGN BODY;  COMBINED ESOPHAGOSCOPY, GASTROSCOPY, DUODENOSCOPY (EGD) [by pascal net and snare with profol sedation;  Surgeon: Feliciano Emmanuel MD;  Location:  GI     ESOPHAGOSCOPY, GASTROSCOPY, DUODENOSCOPY (EGD), COMBINED N/A 3/19/2018    Procedure: COMBINED ESOPHAGOSCOPY, GASTROSCOPY, DUODENOSCOPY (EGD), REMOVE FOREIGN BODY;   Esophagodscopy, Gastroscopy, Duodenoscopy,Foreign Body Removal;  Surgeon: Brice Guzmán MD;  Location: [facility]     ESOPHAGOSCOPY, GASTROSCOPY, DUODENOSCOPY (EGD), COMBINED N/A 4/16/2018    Procedure: COMBINED  ESOPHAGOSCOPY, GASTROSCOPY, DUODENOSCOPY (EGD), REMOVE FOREIGN BODY;  Esophagogastroduodenoscopy  Foreign Body Removal X 2;  Surgeon: Royer Moise MD;  [facility]: [facility]     ESOPHAGOSCOPY, GASTROSCOPY, DUODENOSCOPY (EGD), COMBINED N/A 6/1/2018    Procedure: COMBINED ESOPHAGOSCOPY, GASTROSCOPY, DUODENOSCOPY (EGD), REMOVE FOREIGN BODY;  COMBINED ESOPHAGOSCOPY, GASTROSCOPY, DUODENOSCOPY with removal of foreign body, propofol sedation by anesthesia;  Surgeon: Brice Martinez MD;  Location:  GI     ESOPHAGOSCOPY, GASTROSCOPY, DUODENOSCOPY (EGD), COMBINED N/A 7/25/2018    Procedure: COMBINED ESOPHAGOSCOPY, GASTROSCOPY, DUODENOSCOPY (EGD), REMOVE FOREIGN BODY;;  Surgeon: Candy Castelan MD;  Location:  GI     ESOPHAGOSCOPY, GASTROSCOPY, DUODENOSCOPY (EGD), COMBINED N/A 7/28/2018    Procedure: COMBINED ESOPHAGOSCOPY, GASTROSCOPY, DUODENOSCOPY (EGD), REMOVE FOREIGN BODY;  COMBINED ESOPHAGOSCOPY, GASTROSCOPY, DUODENOSCOPY (EGD), REMOVE FOREIGN BODY;  Surgeon: Brice Guzmán MD;  [facility]     ESOPHAGOSCOPY, GASTROSCOPY, DUODENOSCOPY (EGD), COMBINED N/A 7/31/2018    Procedure: COMBINED ESOPHAGOSCOPY, GASTROSCOPY, DUODENOSCOPY (EGD);  COMBINED ESOPHAGOSCOPY, GASTROSCOPY, DUODENOSCOPY (EGD) TO REMOVE FOREIGN BODY;  Surgeon: Lokesh Paula MD;  [facility]: [facility]     ESOPHAGOSCOPY, GASTROSCOPY, DUODENOSCOPY (EGD), COMBINED N/A 8/4/2018    Procedure: COMBINED ESOPHAGOSCOPY, GASTROSCOPY, DUODENOSCOPY (EGD), REMOVE FOREIGN BODY;   combined esophagoscopy, gastroscopy, duodenoscopy, REMOVE FOREIGN BODY ;  Surgeon: Lokesh Paula MD;  [facility]: [facility]     ESOPHAGOSCOPY, GASTROSCOPY, DUODENOSCOPY (EGD), COMBINED N/A 10/6/2019    Procedure: ESOPHAGOGASTRODUODENOSCOPY (EGD) with fireign body removal x2, esophageal stent placement with floroscopy;  Surgeon: Timoteo Espana MD;  [facility]: [facility]     ESOPHAGOSCOPY, GASTROSCOPY, DUODENOSCOPY (EGD), COMBINED N/A 12/2/2019    Procedure:  Esophagogastroduodenoscopy with esophageal stent removal, esophogram;  Surgeon: Kailee Tena MD;  [facility]: [facility]     EXAM UNDER ANESTHESIA ANUS N/A 1/10/2017    Procedure: EXAM UNDER ANESTHESIA ANUS;  Surgeon: Annmarie Haynes MD;  [facility]: [facility]     EXAM UNDER ANESTHESIA RECTUM N/A 7/19/2018    Procedure: EXAM UNDER ANESTHESIA RECTUM;  EXAM UNDER ANESTHESIA, REMOVAL OF RECTAL FOREIGN BODY;  Surgeon: Annmarie Haynes MD;  Location: [facility]     HC REMOVE FECAL IMPACTION OR FB W ANESTHESIA N/A 12/18/2016    Procedure: REMOVE FECAL IMPACTION/FOREIGN BODY UNDER ANESTHESIA;  Surgeon: Soham Cano MD;  Location: [facility] OR     KNEE SURGERY - removed a small tissue mass from knee Right      LAPAROSCOPIC ABLATION ENDOMETRIOSIS       LAPAROTOMY EXPLORATORY N/A 1/10/2017    Procedure: LAPAROTOMY EXPLORATORY;  Surgeon: Annmarie Haynes MD;  Location: [facility]     LAPAROTOMY EXPLORATORY  09/11/2019    Manual manipulation of bowel to remove pill bottle in rectum     lymph nodes removed from neck; benign  age 7     MAMMOPLASTY REDUCTION Bilateral      RELEASE CARPAL TUNNEL Bilateral      SIGMOIDOSCOPY FLEXIBLE N/A 1/10/2017    Procedure: SIGMOIDOSCOPY FLEXIBLE;  Surgeon: Annmarie Haynes MD;  [facility]: [facility]     SIGMOIDOSCOPY FLEXIBLE N/A 5/8/2018    Procedure: SIGMOIDOSCOPY FLEXIBLE;  flex sig with foreign body removal using snare and rattooth forcep;  Surgeon: Soham Cano MD;  Location:  GI     SIGMOIDOSCOPY FLEXIBLE N/A 2/20/2019    Procedure: Exam under anesthesia Colonoscopy with attempted  removal of rectal foreign body;  Surgeon: Estrada Chávez MD;  Location: [facility]     FAMILY HISTORY  Family History   Problem Relation Age of Onset     Diabetes Type 2  Maternal Grandmother      Diabetes Type 2  Paternal Grandmother      Breast Cancer Paternal Grandmother      Cerebrovascular Disease Father 53     Myocardial Infarction No family hx of      Coronary Artery Disease Early  Onset No family hx of      Ovarian Cancer No family hx of      Colon Cancer No family hx of      SOCIAL HISTORY  Social History     Tobacco Use     Smoking status: Never Smoker     Smokeless tobacco: Never Used   Substance Use Topics     Alcohol use: No     Alcohol/week: 0.0 standard drinks     MEDICATIONS  Current Facility-Administered Medications   Medication     diphenhydrAMINE (BENADRYL) injection 25 mg     Current Outpatient Medications   Medication     acetaminophen (TYLENOL) 32 mg/mL liquid     albuterol (PROAIR HFA) 108 (90 Base) MCG/ACT inhaler     alum & mag hydroxide-simethicone (MYLANTA/MAALOX) 200-200-20 MG/5ML SUSP suspension     apixaban ANTICOAGULANT (ELIQUIS STARTER PACK) 5 MG tablet     busPIRone (BUSPAR) 10 MG tablet     calcium carbonate (TUMS) 500 MG chewable tablet     cloNIDine (CATAPRES) 0.1 MG tablet     desvenlafaxine (PRISTIQ) 100 MG 24 hr tablet     diphenhydrAMINE (BENADRYL) 25 MG capsule     docosanol (ABREVA) 10 % CREA cream     gabapentin (NEURONTIN) 600 MG tablet     levonorgestrel (MIRENA) 20 MCG/24HR IUD     lidocaine (XYLOCAINE) 2 % solution     lurasidone (LATUDA) 60 MG TABS tablet     metFORMIN (GLUCOPHAGE-XR) 500 MG 24 hr tablet     omeprazole (PRILOSEC) 2 mg/mL suspension     ondansetron (ZOFRAN-ODT) 4 MG ODT tab     ondansetron (ZOFRAN-ODT) 8 MG ODT tab     oxyCODONE (ROXICODONE) 5 MG tablet     potassium chloride (KLOR-CON) 20 MEQ packet     simethicone (MYLICON) 80 MG chewable tablet     sucralfate (CARAFATE) 1 GM/10ML suspension     topiramate (TOPAMAX) 100 MG tablet     vitamin D3 (CHOLECALCIFEROL) 2000 units (50 mcg) tablet     ALLERGIES  Allergies   Allergen Reactions     Amoxicillin-Pot Clavulanate Other (See Comments), Rash and Swelling     PN: facial swelling, left side. Also had cortisone injection the same day as she started the Augmentin.  PN: facial swelling, left side. Also had cortisone injection the same day as she started the Augmentin.  Noted in downtime  recovery of chart.       Penicillins Anaphylaxis     Vancomycin Swelling, Itching and Rash     Other reaction(s): Redness  Flushed face, very itchy; HUT Comment: Flushed face, very itchy; HUT Reaction: Angioedema; HUT Reaction: Redness; HUT Severity: Med; HUT Noted: [id number]  Flushed face, very itchy  Flushed face, very itchy  Flushed face, very itchy       Hydrocodone Nausea and Vomiting and GI Disturbance     vomiting for days  vomiting for days  vomiting for days  vomiting for days, PN: vomiting for days  vomiting for days  vomiting for days  vomiting for days  vomiting for days  vomiting for days  vomiting for days, PN: vomiting for days  vomiting for days  vomiting for days  vomiting for days  vomiting for days  ; HUT Comment: vomiting for days; HUT Reaction: Gastrointestinal; HUT Reaction: Nausea And Vomiting; HUT Reaction Type: Intolerance; HUT Severity: Med; HUT Noted: [id number]  vomiting for days       Blood-Group Specific Substance Other (See Comments)     Patient has an anti-Cw and non-specific antibodies. Blood product orders may be delayed. Draw one red top and two purple top tubes for all type/screen/crossmatch orders.     Influenza Vaccines Other (See Comments)     Oseltamivir Hives     med stopped, PN: med stopped     Cephalosporins Rash     Lamotrigine Rash     Possibly associated with Lamictal.   HUT Comment: Possibly associated with Lamictal. ; HUT Reaction: Rash; HUT Reaction Type: Allergy; HUT Severity: Low; HUT Noted: [id number]     Latex Rash       I have reviewed the Medications, Allergies, Past Medical and Surgical History, and Social History in the Epic system.    Review of Systems   HENT: Positive for sore throat.    Gastrointestinal: Positive for abdominal pain.   All other systems reviewed and are negative.      Physical Exam   BP: (!) 152/89  Heart Rate: 102  Temp: 99.3  F (37.4  C)  Resp: 16  Weight: 111.9 kg (246 lb 11.1 oz)  SpO2: 99 %      Physical Exam  Constitutional:       General:  She is not in acute distress.     Appearance: She is well-developed. She is not ill-appearing or diaphoretic.      Comments: Comfortably resting, lying in bed, NAD, nondiaphoretic, lucid, fully conversant, no  respiratory distress, alert and oriented.     HENT:      Head: Normocephalic and atraumatic.      Mouth/Throat:      Pharynx: No oropharyngeal exudate.   Eyes:      General: No scleral icterus.     Pupils: Pupils are equal, round, and reactive to light.   Neck:      Musculoskeletal: Normal range of motion and neck supple.   Cardiovascular:      Rate and Rhythm: Normal rate.      Heart sounds: Normal heart sounds.   Pulmonary:      Effort: Pulmonary effort is normal. No respiratory distress.      Breath sounds: Normal breath sounds.   Abdominal:      Palpations: Abdomen is soft.      Tenderness: There is no abdominal tenderness.      Comments: Non tender    Musculoskeletal:         General: No tenderness.   Skin:     General: Skin is warm and dry.      Capillary Refill: Capillary refill takes less than 2 seconds.      Coloration: Skin is not pale.      Findings: No erythema or rash.   Neurological:      Mental Status: She is alert and oriented to person, place, and time.         ED Course        Procedures             Critical Care time:  none             Labs Ordered and Resulted from Time of ED Arrival Up to the Time of Departure from the ED   CBC WITH PLATELETS DIFFERENTIAL - Abnormal; Notable for the following components:       Result Value    Hemoglobin 11.5 (*)     All other components within normal limits   BASIC METABOLIC PANEL - Abnormal; Notable for the following components:    Chloride 112 (*)     Glucose 104 (*)     All other components within normal limits          I have reviewed the patient's prior chart including recent labs, ER visits, and available inpatient discharge summaries if available.    Assessments & Plan (with Medical Decision Making)   This is a 29-year-old female patient coming into  "the emergency room with complaints of swallowing a foreign body.  Today and she straightened out that helped for a [place] ornament and swallowed it.  She did this out of a soothing mechanism because she is stressed out.  This is the patient is well-known to our emergency room for frequently swallowing foreign bodies.  Here she is in no obvious distress.  Her vital signs are otherwise stable.  Her abdomen is soft nontender and she has no respiratory complaints at this time.  GI was consulted after an x-ray confirmed a foreign body in the abdomen.  GI was able to take her to the operating room and successfully remove the foreign object successfully without complications.  Patient was kept in a postop area and is now medically stable to return to the emergency room.  Patient states that she still feels like she has a significant amount of distress feels like she would continue swelling objects if she was discharged.  She is requesting a voluntary admission to psych to help deal with her anxiety and stress.  The case was discussed with mental intake and they want to accept to their service.      I have reviewed the nursing notes.    I have reviewed the findings, diagnosis, plan and need for follow up with the patient.    New Prescriptions    No medications on file       Final diagnoses:   Swallowed foreign body, initial encounter   At high risk for self harm     I, Alma Lala, am serving as a trained medical scribe to document services personally performed by Jeffy Velasquez MD, based on the provider's statements to me.      IJeffy MD, was physically present and have reviewed and verified the accuracy of this note documented by Alma Lala.       \"This dictation was performed with the assistance of voice recognition software and may contain inadvertant transcription  errors,  omissions and/or  inadvertent word substitution.\" --Jeffy Velasquez MD     12/13/2019   [facility], [place], EMERGENCY DEPARTMENT   "   Jeffy Velasquez MD  12/14/19 0902

## 2019-12-14 NOTE — ED NOTES
Sign out Provider: Nas      Sign out Plan: Patient seen on earlier shift.  Initially presented with a swallowed foreign body which was removed.  Deemed in need of psychiatric admission.  Transferred here to wait for an available inpatient bed.      Reassessment: Patient reportedly has complained of generalized itching throughout the night.  In speaking with her this is a somewhat chronic condition.  She states her home medications include Benadryl 25 to 50 mg every 6 hours as needed for itching or sleep.  Initially I reviewed her chart and I do see this is 1 of her home medications.  I will order that.      Disposition: Continue to await inpatient bed placement       Barney Garcia MD  12/14/19 [id number]

## 2019-12-14 NOTE — ANESTHESIA PREPROCEDURE EVALUATION
Anesthesia Pre-Procedure Evaluation    Patient: Nevin Alvarado   MRN:     [id number] Gender:   female   Age:    29 year old :      1990        Preoperative Diagnosis: Foreign body in digestive tract, initial encounter [T18.9XXA]   Procedure(s):  ESOPHAGOGASTRODUODENOSCOPY, WITH FOREIGN BODY REMOVAL     Past Medical History:   Diagnosis Date     ADD (attention deficit disorder)      Anorexia nervosa with bulimia     history of; on Topamax     Anxiety      Borderline personality disorder (H)      Depression      Depressive disorder      H/O self-harm      Lives in independent group home     due to debilitating mental illness     Migraine without aura     no known triggers; on Topamax bid and Imitrex PRN     Morbid obesity (H)      PTSD (post-traumatic stress disorder)      Rectal foreign body - Recurrent issue, self placed      Swallowed foreign body - Recurrent issue, self placed       Past Surgical History:   Procedure Laterality Date     COMBINED ESOPHAGOSCOPY, GASTROSCOPY, DUODENOSCOPY (EGD), REPLACE ESOPHAGEAL STENT N/A 10/9/2019    Procedure: Upper Endoscopy with Suture Placement;  Surgeon: Zurdo Ramirez MD;  [facility]     ESOPHAGOSCOPY, GASTROSCOPY, DUODENOSCOPY (EGD), COMBINED N/A 3/9/2017    Procedure: COMBINED ESOPHAGOSCOPY, GASTROSCOPY, DUODENOSCOPY (EGD), REMOVE FOREIGN BODY;  Surgeon: Avis Guzmán MD;  Location: [facility]     ESOPHAGOSCOPY, GASTROSCOPY, DUODENOSCOPY (EGD), COMBINED N/A 2017    Procedure: COMBINED ESOPHAGOSCOPY, GASTROSCOPY, DUODENOSCOPY (EGD), REMOVE FOREIGN BODY;  EGD removal Foregin body;  Surgeon: Lokesh Paula MD;  [facility]: [facility]     ESOPHAGOSCOPY, GASTROSCOPY, DUODENOSCOPY (EGD), COMBINED N/A 2017    Procedure: COMBINED ESOPHAGOSCOPY, GASTROSCOPY, DUODENOSCOPY (EGD);  COMBINED ESOPHAGOSCOPY, GASTROSCOPY, DUODENOSCOPY (EGD) [[id number]]attempted removal of foreign body;  Surgeon: Pamela Perez MD;  [facility]     ESOPHAGOSCOPY, GASTROSCOPY, DUODENOSCOPY (EGD), COMBINED N/A 6/9/2017    Procedure: COMBINED ESOPHAGOSCOPY, GASTROSCOPY, DUODENOSCOPY (EGD), REMOVE FOREIGN BODY;  Esophagoscopy, Gastroscopy, Duodenoscopy, Removal of Foreign Body;  Surgeon: Dejon Marsh MD;  [facility]: [facility]     ESOPHAGOSCOPY, GASTROSCOPY, DUODENOSCOPY (EGD), COMBINED N/A 1/6/2018    Procedure: COMBINED ESOPHAGOSCOPY, GASTROSCOPY, DUODENOSCOPY (EGD), REMOVE FOREIGN BODY;  COMBINED ESOPHAGOSCOPY, GASTROSCOPY, DUODENOSCOPY (EGD) [by pascal net and snare with profol sedation;  Surgeon: Feliciano Emmanuel MD;  Location:  GI     ESOPHAGOSCOPY, GASTROSCOPY, DUODENOSCOPY (EGD), COMBINED N/A 3/19/2018    Procedure: COMBINED ESOPHAGOSCOPY, GASTROSCOPY, DUODENOSCOPY (EGD), REMOVE FOREIGN BODY;   Esophagodscopy, Gastroscopy, Duodenoscopy,Foreign Body Removal;  Surgeon: Brice Guzmán MD;  [facility]     ESOPHAGOSCOPY, GASTROSCOPY, DUODENOSCOPY (EGD), COMBINED N/A 4/16/2018    Procedure: COMBINED ESOPHAGOSCOPY, GASTROSCOPY, DUODENOSCOPY (EGD), REMOVE FOREIGN BODY;  Esophagogastroduodenoscopy  Foreign Body Removal X 2;  Surgeon: Royer Moise MD;  [facility]     ESOPHAGOSCOPY, GASTROSCOPY, DUODENOSCOPY (EGD), COMBINED N/A 6/1/2018    Procedure: COMBINED ESOPHAGOSCOPY, GASTROSCOPY, DUODENOSCOPY (EGD), REMOVE FOREIGN BODY;  COMBINED ESOPHAGOSCOPY, GASTROSCOPY, DUODENOSCOPY with removal of foreign body, propofol sedation by anesthesia;  Surgeon: Brice Martinez MD;  Location:  GI     ESOPHAGOSCOPY, GASTROSCOPY, DUODENOSCOPY (EGD), COMBINED N/A 7/25/2018    Procedure: COMBINED ESOPHAGOSCOPY, GASTROSCOPY, DUODENOSCOPY (EGD), REMOVE FOREIGN BODY;;  Surgeon: Candy Castelan MD;  Location:  GI     ESOPHAGOSCOPY, GASTROSCOPY, DUODENOSCOPY (EGD), COMBINED N/A 7/28/2018    Procedure: COMBINED ESOPHAGOSCOPY, GASTROSCOPY, DUODENOSCOPY (EGD), REMOVE FOREIGN BODY;  COMBINED ESOPHAGOSCOPY, GASTROSCOPY,  DUODENOSCOPY (EGD), REMOVE FOREIGN BODY;  Surgeon: Brice Guzmán MD;  [facility]     ESOPHAGOSCOPY, GASTROSCOPY, DUODENOSCOPY (EGD), COMBINED N/A 7/31/2018    Procedure: COMBINED ESOPHAGOSCOPY, GASTROSCOPY, DUODENOSCOPY (EGD);  COMBINED ESOPHAGOSCOPY, GASTROSCOPY, DUODENOSCOPY (EGD) TO REMOVE FOREIGN BODY;  Surgeon: Lokesh Paula MD;  [facility]     ESOPHAGOSCOPY, GASTROSCOPY, DUODENOSCOPY (EGD), COMBINED N/A 8/4/2018    Procedure: COMBINED ESOPHAGOSCOPY, GASTROSCOPY, DUODENOSCOPY (EGD), REMOVE FOREIGN BODY;   combined esophagoscopy, gastroscopy, duodenoscopy, REMOVE FOREIGN BODY ;  Surgeon: Lokesh Paula MD;  [facility]     ESOPHAGOSCOPY, GASTROSCOPY, DUODENOSCOPY (EGD), COMBINED N/A 10/6/2019    Procedure: ESOPHAGOGASTRODUODENOSCOPY (EGD) with fireign body removal x2, esophageal stent placement with floroscopy;  Surgeon: Timoteo Espana MD;  [facility]     ESOPHAGOSCOPY, GASTROSCOPY, DUODENOSCOPY (EGD), COMBINED N/A 12/2/2019    Procedure: Esophagogastroduodenoscopy with esophageal stent removal, esophogram;  Surgeon: Kailee Tena MD;  [facility]     EXAM UNDER ANESTHESIA ANUS N/A 1/10/2017    Procedure: EXAM UNDER ANESTHESIA ANUS;  Surgeon: Annmarie Haynes MD;  [facility]     EXAM UNDER ANESTHESIA RECTUM N/A 7/19/2018    Procedure: EXAM UNDER ANESTHESIA RECTUM;  EXAM UNDER ANESTHESIA, REMOVAL OF RECTAL FOREIGN BODY;  Surgeon: Annmarie Haynes MD;  [facility]     HC REMOVE FECAL IMPACTION OR FB W ANESTHESIA N/A 12/18/2016    Procedure: REMOVE FECAL IMPACTION/FOREIGN BODY UNDER ANESTHESIA;  Surgeon: Soham Cano MD;  Location: [facility]     KNEE SURGERY - removed a small tissue mass from knee Right      LAPAROSCOPIC ABLATION ENDOMETRIOSIS       LAPAROTOMY EXPLORATORY N/A 1/10/2017    Procedure: LAPAROTOMY EXPLORATORY;  Surgeon: Annmarie Haynes MD;  [facility]: [facility]     LAPAROTOMY EXPLORATORY  09/11/2019    Manual  manipulation of bowel to remove pill bottle in rectum     lymph nodes removed from neck; benign  age 7     MAMMOPLASTY REDUCTION Bilateral      RELEASE CARPAL TUNNEL Bilateral      SIGMOIDOSCOPY FLEXIBLE N/A 1/10/2017    Procedure: SIGMOIDOSCOPY FLEXIBLE;  Surgeon: Annmarie aHynes MD;  [facility]: [facility]     SIGMOIDOSCOPY FLEXIBLE N/A 5/8/2018    Procedure: SIGMOIDOSCOPY FLEXIBLE;  flex sig with foreign body removal using snare and rattooth forcep;  Surgeon: Soham Cano MD;  Location:  GI     SIGMOIDOSCOPY FLEXIBLE N/A 2/20/2019    Procedure: Exam under anesthesia Colonoscopy with attempted  removal of rectal foreign body;  Surgeon: Estrada Chávez MD;  Location: [facility]          Anesthesia Evaluation     . Pt has had prior anesthetic. Type: General and MAC    No history of anesthetic complications          ROS/MED HX    ENT/Pulmonary:  - neg pulmonary ROS   (+)ZOHRA risk factors obese, , . .    Neurologic:  - neg neurologic ROS     Cardiovascular:     (+) ----. : . . fainting (syncope). :. .       METS/Exercise Tolerance:  >4 METS   Hematologic: Comments: Anticoagulation held >12h - neg hematologic  ROS   (+) History of blood clots pt is anticoagulated, -      Musculoskeletal:  - neg musculoskeletal ROS       GI/Hepatic: Comment: S/p multiple foreign body ingestions resulting in esophogeal perforation, stenting 10/9/2019, stricture, pain           Renal/Genitourinary:  - ROS Renal section negative       Endo:  - neg endo ROS   (+) Obesity, .      Psychiatric: Comment: PTSD, Intermittent suicidal ideation      (+) psychiatric history anxiety, depression and bipolar      Infectious Disease:  - neg infectious disease ROS       Malignancy:      - no malignancy   Other:    (+) No chance of pregnancy C-spine cleared: N/A, no H/O Chronic Pain,no other significant disability   - neg other ROS                     PHYSICAL EXAM:   Mental Status/Neuro: A/A/O   Airway: Facies: Feasible  Mallampati: I  Mouth/Opening:  Full  TM distance: > 6 cm  Neck ROM: Full   Respiratory: Auscultation: CTAB     Resp. Rate: Normal     Resp. Effort: Normal      CV: Rhythm: Regular  Rate: Age appropriate  Heart: Normal Sounds  Edema: None   Comments:      Dental: Normal Dentition                LABS:  CBC:   Lab Results   Component Value Date    WBC 9.5 12/13/2019    WBC 6.8 12/09/2019    HGB 11.5 (L) 12/13/2019    HGB 11.2 (L) 12/09/2019    HCT 36.5 12/13/2019    HCT 35.9 12/09/2019     12/13/2019     12/09/2019     BMP:   Lab Results   Component Value Date     12/09/2019     12/05/2019    POTASSIUM 3.1 (L) 12/09/2019    POTASSIUM 3.6 12/05/2019    CHLORIDE 113 (H) 12/09/2019    CHLORIDE 110 (H) 12/05/2019    CO2 23 12/09/2019    CO2 26 12/05/2019    BUN 9 12/09/2019    BUN 4 (L) 12/05/2019    CR 0.51 (L) 12/09/2019    CR 0.64 12/05/2019     (H) 12/09/2019    GLC 87 12/05/2019     COAGS:   Lab Results   Component Value Date    PTT 31 10/06/2019    INR 1.45 (H) 12/09/2019     POC:   Lab Results   Component Value Date    BGM 99 11/03/2019    HCG Negative 12/02/2019    HCGS Negative 11/28/2019     OTHER:   Lab Results   Component Value Date    LACT 1.0 11/28/2019    KELBY 8.3 (L) 12/09/2019    PHOS 3.5 12/01/2019    MAG 1.9 12/05/2019    ALBUMIN 3.1 (L) 12/09/2019    PROTTOTAL 6.2 (L) 12/09/2019    ALT 24 12/09/2019    AST 12 12/09/2019    ALKPHOS 73 12/09/2019    BILITOTAL 0.2 12/09/2019    LIPASE 40 (L) 12/04/2019    TSH 0.66 03/21/2018    T4 0.96 05/16/2017    CRP 6.6 12/09/2019    SED 26 (H) 07/11/2017        Preop Vitals    BP Readings from Last 3 Encounters:   12/13/19 (!) 152/89   12/09/19 121/78   12/06/19 (!) 140/83    Pulse Readings from Last 3 Encounters:   12/09/19 90   12/05/19 72   11/19/19 89      Resp Readings from Last 3 Encounters:   12/09/19 16   12/06/19 16   12/05/19 16    SpO2 Readings from Last 3 Encounters:   12/13/19 99%   12/09/19 100%   12/06/19 95%      Temp Readings from Last 1 Encounters:  "  12/13/19 37.4  C (99.3  F) (Oral)    Ht Readings from Last 1 Encounters:   12/06/19 1.575 m (5' 2\")      Wt Readings from Last 1 Encounters:   12/13/19 111.9 kg (246 lb 11.1 oz)    Estimated body mass index is 45.12 kg/m  as calculated from the following:    Height as of 12/6/19: 1.575 m (5' 2\").    Weight as of this encounter: 111.9 kg (246 lb 11.1 oz).     LDA:  Port A Cath Single 06/26/19 Right Chest wall (Active)   Access Date 12/13/19 12/13/2019  8:39 PM   Access Attempts 2 12/13/2019  8:39 PM   Gauge/Length 20 gauge;3/4 inch 12/13/2019  8:39 PM   Site Assessment WDL 12/13/2019  8:39 PM   Line Status Saline locked 12/13/2019  8:39 PM   Extravasation? No 12/13/2019  8:39 PM   Dressing Intervention Transparent;Chlorhexadine sponge 12/13/2019  8:39 PM   Number of days: 170        Assessment:   ASA SCORE: 2    H&P: History and physical reviewed and following examination; no interval change.    NPO Status: ELEVATED Aspiration Risk/Unknown     Plan:   Anes. Type:  General   Pre-Medication: None   Induction:  IV (RSI)   Airway: ETT; Oral   Access/Monitoring: PIV   Maintenance: Balanced     Postop Plan:   Postop Pain: Opioids  Postop Sedation/Airway: Not planned     PONV Management:   Adult Risk Factors: Female, Postop Opioids   Prevention: Ondansetron, Dexamethasone     CONSENT: Direct conversation   Plan and risks discussed with: Patient                      Lizzy Abdul MD  "

## 2019-12-14 NOTE — ED NOTES
Emergency Department Patient Sign-out       Brief HPI:  This is a 29 year old female signed out to me by Dr. Velasquez .  See initial ED Provider note for details of the presentation.     Significant Events prior to my assuming care: Patient with swallowed foreign body. Increased stressed, [facility] She swallowed part of a samuel ornament that was removed by GI.      Exam:   Patient Vitals for the past 24 hrs:   BP Temp Temp src Heart Rate Resp SpO2 Weight   12/14/19 0049 -- -- -- -- -- 95 % --   12/14/19 0030 (!) 148/90 -- -- 95 16 96 % --   12/14/19 0015 (!) 142/76 -- -- 96 14 97 % --   12/14/19 0000 136/78 -- -- 100 16 96 % --   12/13/19 2345 128/75 -- -- 102 14 98 % --   12/13/19 2330 123/73 -- -- 100 16 100 % --   12/13/19 2315 122/66 97.8  F (36.6  C) -- 99 14 100 % --   12/13/19 2300 127/61 -- -- 90 14 99 % --   12/13/19 2245 (!) 147/95 97.6  F (36.4  C) Oral 100 14 99 % --   12/13/19 2117 135/78 98.6  F (37  C) Oral 100 16 97 % --   12/13/19 1927 (!) 152/89 99.3  F (37.4  C) Oral 102 -- 99 % 111.9 kg (246 lb 11.1 oz)           ED RESULTS:   Results for orders placed or performed during the hospital encounter of 12/13/19 (from the past 24 hour(s))   XR Abdomen Port 1 View     Status: Abnormal    Collection Time: 12/13/19  7:58 PM   Result Value Ref Range    Radiologist flags Foreign body (Urgent)     Narrative    Exam: XR ABDOMEN PORT 1 VW, 12/13/2019 7:58 PM    Indication: swallowed metal hook    Comparison: None    Findings:   Portable supine AP radiograph the abdomen.  Linear radiodense foreign  body projecting over the stomach. No obvious free air on this supine  radiograph. No pneumatosis or portal venous gas. No dilated loops of  small or large bowel.      Impression    Impression:   Linear radiodense foreign body projecting over the stomach.     [Urgent Result: Foreign body]    Finding was identified on 12/13/2019 8:12 PM.     Dr. Velasquez was contacted by Dr. Quezada at  12/13/2019 8:15 PM and  verbalized understanding of the urgent finding.     I have personally reviewed the examination and initial interpretation  and I agree with the findings.    RICHELLE JACKSON MD   XR Chest Port 1 View     Status: None    Collection Time: 12/13/19  7:59 PM    Narrative    Exam: XR CHEST PORT 1 VW, 12/13/2019 7:59 PM    Indication: swallowed metal hook    Comparison: Chest x-ray 12/9/2019    Findings:   Portable upright AP radiograph the chest. Right chest wall Port-A-Cath  tip projects over the low SVC. Cardiac silhouette and pulmonary  vasculature are within normal limits. No pneumothorax or pleural  effusion. A focal airspace opacities. No radiodense foreign body is  identified. No acute bony abnormalities. Upper abdomen unremarkable.      Impression    Impression:   1. No foreign body is identified.  2. No acute airspace disease.    I have personally reviewed the examination and initial interpretation  and I agree with the findings.    RICHELLE JACKSON MD   CBC with platelets differential     Status: Abnormal    Collection Time: 12/13/19  8:35 PM   Result Value Ref Range    WBC 9.5 4.0 - 11.0 10e9/L    RBC Count 4.00 3.8 - 5.2 10e12/L    Hemoglobin 11.5 (L) 11.7 - 15.7 g/dL    Hematocrit 36.5 35.0 - 47.0 %    MCV 91 78 - 100 fl    MCH 28.8 26.5 - 33.0 pg    MCHC 31.5 31.5 - 36.5 g/dL    RDW 14.7 10.0 - 15.0 %    Platelet Count 294 150 - 450 10e9/L    Diff Method Automated Method     % Neutrophils 73.2 %    % Lymphocytes 18.9 %    % Monocytes 5.9 %    % Eosinophils 1.4 %    % Basophils 0.4 %    % Immature Granulocytes 0.2 %    Nucleated RBCs 0 0 /100    Absolute Neutrophil 6.9 1.6 - 8.3 10e9/L    Absolute Lymphocytes 1.8 0.8 - 5.3 10e9/L    Absolute Monocytes 0.6 0.0 - 1.3 10e9/L    Absolute Eosinophils 0.1 0.0 - 0.7 10e9/L    Absolute Basophils 0.0 0.0 - 0.2 10e9/L    Abs Immature Granulocytes 0.0 0 - 0.4 10e9/L    Absolute Nucleated RBC 0.0    Basic metabolic panel     Status: Abnormal     Collection Time: 12/13/19  8:35 PM   Result Value Ref Range    Sodium 141 133 - 144 mmol/L    Potassium 3.8 3.4 - 5.3 mmol/L    Chloride 112 (H) 94 - 109 mmol/L    Carbon Dioxide 22 20 - 32 mmol/L    Anion Gap 7 3 - 14 mmol/L    Glucose 104 (H) 70 - 99 mg/dL    Urea Nitrogen 12 7 - 30 mg/dL    Creatinine 0.55 0.52 - 1.04 mg/dL    GFR Estimate >90 >60 mL/min/[1.73_m2]    GFR Estimate If Black >90 >60 mL/min/[1.73_m2]    Calcium 8.9 8.5 - 10.1 mg/dL       ED MEDICATIONS:   Medications   diphenhydrAMINE (BENADRYL) injection 25 mg (25 mg Intravenous Given 12/14/19 0029)   HYDROmorphone (DILAUDID) injection 1 mg (1 mg Intravenous Given 12/13/19 2102)         Impression:  No diagnosis found.    Plan:    Patient to be admitted.        MD Nas Hart Matthew Joseph, MD  12/14/19 0229

## 2019-12-14 NOTE — ED NOTES
dennis arrived to ED. Patient alert and oriented. Patient states she wants to speak to Dr. Velasquez related to discharge. Patient contracted for safety in ED.

## 2019-12-14 NOTE — ANESTHESIA CARE TRANSFER NOTE
Patient: Nevin Alvarado    Procedure(s):  ESOPHAGOGASTRODUODENOSCOPY, WITH FOREIGN BODY REMOVAL    Diagnosis: Foreign body in digestive tract, initial encounter [T18.9XXA]  Diagnosis Additional Information: No value filed.    Anesthesia Type:   General     Note:  Airway :Nasal Cannula  Patient transferred to:PACU  Comments: Pt. Pink and breathing spontaneously.  Vitals stable.  Report given to oncoming nurse.  Transfer of care occurred. Handoff Report: Identifed the Patient, Identified the Reponsible Provider, Reviewed the pertinent medical history, Discussed the surgical course, Reviewed Intra-OP anesthesia mangement and issues during anesthesia, Set expectations for post-procedure period and Allowed opportunity for questions and acknowledgement of understanding      Vitals: (Last set prior to Anesthesia Care Transfer)    CRNA VITALS  12/13/2019 2213 - 12/13/2019 2254      12/13/2019             Pulse:  95    SpO2:  99 %    Resp Rate (observed):  (!) 1                Electronically Signed By: HU Sanchez CRNA  December 13, 2019  10:54 PM

## 2019-12-14 NOTE — ED TRIAGE NOTES
BIBA after swallowing a metal Clipboardament hook. Pt reported the hook was straightened. Pt reports abdominal pain and a scratchy throat. Airway patent. Pt reports some pain with swallowing. Pt reports she perforated her esophagus in October r/t swallowing foreign objects. Pt reports she is currently on a blood thinner.

## 2019-12-14 NOTE — ANESTHESIA POSTPROCEDURE EVALUATION
Anesthesia POST Procedure Evaluation    Patient: Nevin Alvarado   MRN:     [id number] Gender:   female   Age:    29 year old :      1990        Preoperative Diagnosis: Foreign body in digestive tract, initial encounter [T18.9XXA]   Procedure(s):  ESOPHAGOGASTRODUODENOSCOPY, WITH FOREIGN BODY REMOVAL   Postop Comments: No value filed.       Anesthesia Type:  Not documented  General    Reportable Event: NO     PAIN: Uncomplicated   Sign Out status: Comfortable, Well controlled pain     PONV: No PONV   Sign Out status:  No Nausea or Vomiting     Neuro/Psych: Uneventful perioperative course   Sign Out Status: Preoperative baseline; Age appropriate mentation     Airway/Resp.: Uneventful perioperative course   Sign Out Status: Non labored breathing, age appropriate RR; Resp. Status within EXPECTED Parameters     CV: Uneventful perioperative course   Sign Out status: Appropriate BP and perfusion indices; Appropriate HR/Rhythm     Disposition:   Sign Out in:  PACU  Disposition:  Floor  Recovery Course: Uneventful  Follow-Up: Not required           Last Anesthesia Record Vitals:  CRNA VITALS  2019 2213 - 2019 2313      2019             Pulse:  95    SpO2:  99 %    Resp Rate (observed):  (!) 1          Last PACU Vitals:  Vitals Value Taken Time   /73 2019 11:30 PM   Temp 36.6  C (97.8  F) 2019 11:15 PM   Pulse     Resp 16 2019 11:30 PM   SpO2 100 % 2019 11:30 PM   Temp src     NIBP     Pulse     SpO2     Resp     Temp     Ht Rate     Temp 2           Electronically Signed By: Lizzy Abdul MD, 2019, 12:17 AM

## 2019-12-14 NOTE — OP NOTE
Gastroenterology Brief Operative Note    Procedure:  Upper endoscopy   Post-operative diagnosis:  Foreign Body in the stomach, removed   Staff Physician:  Dr. Samia Stanton   Fellow/Assistant(s):  Sonam TINSLEY    Specimens:  Please see final procedure note for further details.   Findings:  Metallic Foreign Body found in the stomach, removed with snare   Complications:  None.   Condition:  Stable   Recommendations  Diet:  Return to previous diet  PPI:  N/A  Anti-coagulants/platelets:  Resume previous anticoagulation  Octreotide:  N/A  Discharge Planning:   Discharge home when stable from anesthesia recovery in the ED

## 2019-12-14 NOTE — DISCHARGE INSTRUCTIONS
Thank you for choosing [facility].     Please closely monitor for further symptoms. Return to the Emergency Department if you develop any new or worsening signs or symptoms.    If you received any opiate pain medications or sedatives during your visit, please do not drive for at least 8 hours.     Labs, cultures or final xray interpretations may still need to be reviewed.  We will call you if your plan of care needs to be changed.    Please follow up with  and with therapy.  Utilize coping skills.

## 2019-12-14 NOTE — ED NOTES
ED to Behavioral Floor Handoff    SITUATION  Nevin Alvarado is a 29 year old female who speaks English and lives in a group home unknown The patient arrived in the ED by ambulance from emergency room with a complaint of Swallowed Foreign Body  .The patient's current symptoms started/worsened 1 day(s) ago and during this time the symptoms have increased.   In the ED, pt was diagnosed with   Final diagnoses:   Swallowed foreign body, initial encounter   At high risk for self harm        Initial vitals were: BP: (!) 152/89  Pulse: 89  Heart Rate: 102  Temp: 99.3  F (37.4  C)  Resp: 16  Weight: 111.9 kg (246 lb 11.1 oz)  SpO2: 99 %   --------  Is the patient diabetic? No   If yes, last blood glucose? --     If yes, was this treated in the ED? --  --------  Is the patient inebriated (ETOH) No or Impaired on other substances? No  MSSA done? No  Last MSSA score: --    Were withdrawal symptoms treated? No  Does the patient have a seizure history? No. If yes, date of most recent seizure--  --------  Is the patient patient experiencing suicidal ideation? denies current or recent suicidal ideation     Homicidal ideation? denies current or recent homicidal ideation or behaviors.    Self-injurious behavior/urges? reports current or recent self injurious behavior or ideation including pica.  ------  Was pt aggressive in the ED No  Was a code called No  Is the pt now cooperative? Yes  -------  Meds given in ED:   Medications   diphenhydrAMINE (BENADRYL) injection 25 mg (25 mg Intravenous Given 12/14/19 0029)   Vitamin D3 (CHOLECALCIFEROL) 25 mcg (1000 units) tablet 50 mcg (50 mcg Oral Given 12/14/19 8734)   diphenhydrAMINE (BENADRYL) capsule 50 mg (has no administration in time range)   HYDROmorphone (DILAUDID) injection 1 mg (1 mg Intravenous Given 12/13/19 2102)   hydrOXYzine (ATARAX) tablet 25 mg (25 mg Oral Given 12/14/19 8686)   hydrOXYzine (ATARAX) tablet 25 mg (25 mg Oral Given 12/14/19 2870)   heparin 100 UNIT/ML  injection 5 mL (5 mLs Intracatheter Given 12/14/19 1107)   apixaban ANTICOAGULANT (ELIQUIS) tablet 5 mg (5 mg Oral Given 12/14/19 0755)   busPIRone (BUSPAR) tablet 10 mg (10 mg Oral Given 12/14/19 0753)   cloNIDine (CATAPRES) tablet 0.1 mg (0.1 mg Oral Given 12/14/19 0758)   desvenlafaxine (PRISTIQ) 24 hr tablet 100 mg (100 mg Oral Given 12/14/19 0751)   gabapentin (NEURONTIN) tablet 600 mg (600 mg Oral Given 12/14/19 0753)      Family present during ED course? No  Family currently present? No    BACKGROUND  Does the patient have a cognitive impairment or developmental disability? No  Allergies:   Allergies   Allergen Reactions     Amoxicillin-Pot Clavulanate Other (See Comments), Rash and Swelling     PN: facial swelling, left side. Also had cortisone injection the same day as she started the Augmentin.  PN: facial swelling, left side. Also had cortisone injection the same day as she started the Augmentin.  Noted in downtime recovery of chart.       Penicillins Anaphylaxis     Vancomycin Swelling, Itching and Rash     Other reaction(s): Redness  Flushed face, very itchy; HUT Comment: Flushed face, very itchy; HUT Reaction: Angioedema; HUT Reaction: Redness; HUT Severity: Med; HUT Noted: [id number]  Flushed face, very itchy  Flushed face, very itchy  Flushed face, very itchy       Hydrocodone Nausea and Vomiting and GI Disturbance     vomiting for days  vomiting for days  vomiting for days  vomiting for days, PN: vomiting for days  vomiting for days  vomiting for days  vomiting for days  vomiting for days  vomiting for days  vomiting for days, PN: vomiting for days  vomiting for days  vomiting for days  vomiting for days  vomiting for days  ; HUT Comment: vomiting for days; HUT Reaction: Gastrointestinal; HUT Reaction: Nausea And Vomiting; HUT Reaction Type: Intolerance; HUT Severity: Med; HUT Noted: [id number]  vomiting for days       Blood-Group Specific Substance Other (See Comments)     Patient has an anti-Cw and  non-specific antibodies. Blood product orders may be delayed. Draw one red top and two purple top tubes for all type/screen/crossmatch orders.     Influenza Vaccines Other (See Comments)     Oseltamivir Hives     med stopped, PN: med stopped     Cephalosporins Rash     Lamotrigine Rash     Possibly associated with Lamictal.   HUT Comment: Possibly associated with Lamictal. ; HUT Reaction: Rash; HUT Reaction Type: Allergy; HUT Severity: Low; HUT Noted: [id number]     Latex Rash   .   Social demographics are   Social History     Socioeconomic History     Marital status: Single     Spouse name: None     Number of children: None     Years of education: None     Highest education level: None   Occupational History     Occupation: On disability   Social Needs     Financial resource strain: None     Food insecurity:     Worry: None     Inability: None     Transportation needs:     Medical: None     Non-medical: None   Tobacco Use     Smoking status: Never Smoker     Smokeless tobacco: Never Used   Substance and Sexual Activity     Alcohol use: No     Alcohol/week: 0.0 standard drinks     Drug use: No     Sexual activity: Yes     Partners: Male     Birth control/protection: I.U.D.     Comment: IUD - Mirena - placed July, 2015   Lifestyle     Physical activity:     Days per week: None     Minutes per session: None     Stress: None   Relationships     Social connections:     Talks on phone: None     Gets together: None     Attends Shinto service: None     Active member of club or organization: None     Attends meetings of clubs or organizations: None     Relationship status: None     Intimate partner violence:     Fear of current or ex partner: None     Emotionally abused: None     Physically abused: None     Forced sexual activity: None   Other Topics Concern     Parent/sibling w/ CABG, MI or angioplasty before 65F 55M? Not Asked   Social History Narrative    Single.    Living in independent living portion of [facility].    On disability.    No regular exercise.         ASSESSMENT  Labs results   Labs Ordered and Resulted from Time of ED Arrival Up to the Time of Departure from the ED   CBC WITH PLATELETS DIFFERENTIAL - Abnormal; Notable for the following components:       Result Value    Hemoglobin 11.5 (*)     All other components within normal limits   BASIC METABOLIC PANEL - Abnormal; Notable for the following components:    Chloride 112 (*)     Glucose 104 (*)     All other components within normal limits      Imaging Studies:   Recent Results (from the past 24 hour(s))   XR Abdomen Port 1 View   Result Value    Radiologist flags Foreign body (Urgent)    Narrative    Exam: XR ABDOMEN PORT 1 VW, 12/13/2019 7:58 PM    Indication: swallowed metal hook    Comparison: None    Findings:   Portable supine AP radiograph the abdomen.  Linear radiodense foreign  body projecting over the stomach. No obvious free air on this supine  radiograph. No pneumatosis or portal venous gas. No dilated loops of  small or large bowel.      Impression    Impression:   Linear radiodense foreign body projecting over the stomach.     [Urgent Result: Foreign body]    Finding was identified on 12/13/2019 8:12 PM.     Dr. Velasquez was contacted by Dr. Quezada at 12/13/2019 8:15 PM and  verbalized understanding of the urgent finding.     I have personally reviewed the examination and initial interpretation  and I agree with the findings.    RICHELLE JACKSON MD   XR Chest Port 1 View    Narrative    Exam: XR CHEST PORT 1 VW, 12/13/2019 7:59 PM    Indication: swallowed metal hook    Comparison: Chest x-ray 12/9/2019    Findings:   Portable upright AP radiograph the chest. Right chest wall Port-A-Cath  tip projects over the low SVC. Cardiac silhouette and pulmonary  vasculature are within normal limits. No pneumothorax or pleural  effusion. A focal airspace opacities. No radiodense foreign body is  identified. No acute bony abnormalities. Upper abdomen  unremarkable.      Impression    Impression:   1. No foreign body is identified.  2. No acute airspace disease.    I have personally reviewed the examination and initial interpretation  and I agree with the findings.    RICHELLE JACKSON MD      Most recent vital signs BP (!) 152/66   Pulse 89   Temp 98.6  F (37  C) (Oral)   Resp 18   Wt 111.9 kg (246 lb 11.1 oz)   SpO2 96%   BMI 45.12 kg/m     Abnormal labs/tests/findings requiring intervention:---   Pain control: good  Nausea control: good    RECOMMENDATION  Are any infection precautions needed (MRSA, VRE, etc.)? No If yes, what infection? --  ---  Does the patient have mobility issues? independently. If yes, what device does the pt use? ---  ---  Is patient on 72 hour hold or commitment? no If on 72 hour hold, have hold and rights been given to patient? No  Are admitting orders written if after 10 p.m. ?No  Tasks needing to be completed:---     Fausto Conner RN   [facility]-- [phone] [facility]   [phone] [facility]

## 2019-12-14 NOTE — ED NOTES
"Pt has been awake since being transferred in ER. Manipulative behaviors noted.  Pt refused to take sched meds stating \" I usually take my meds at 8AM\"  Report given to CARMEN Ortega.  Meds placed in boarder bin  "

## 2019-12-14 NOTE — ED NOTES
Pt c/o not liking her MD. Went to talk wit pt. Spent some time talking with her, but every concern she brought up was turned in some direction other than her desired outcome. Tired to please/ help pt, but seemed to create new issues with every resolution of the old.

## 2019-12-14 NOTE — ED NOTES
Sign out Provider: Nas      Sign out Plan: Patient with longstanding history of chronic mental illness.  Seen yesterday at the [facility] after she ingested a foreign body, which has been part of her self-injurious behavior in the past.  Foreign body was removed and at that time reported to the emergency physician that she did not feel safe to go home and so was transported to this campus for mental health admission.  Was being held in the ED until a mental health inpatient bed is available.  Was considered a voluntary admission.      Reassessment: Patient had an unremarkable shift.  Very cooperative.  Took her medications.  Asked for repeat mental health assessment as she stated she was feeling much better, feeling safe, and wanted to go home.  The patient was also seen by the [facility] , please refer to their extensive note/evaluation which was reviewed with me and is documented in EPIC on 12/14/2019 for further details.  She is now reporting that this was a mistake.  She stated that she does have coping skills and she is disappointed that she did not use them on if yesterday.  She is forward thinking and states she is planning on going to a wild game in the near future.  She is requesting discharge and states she can contract for safety.  She outlines safety plans to the mental health  and myself.  We are making a referral back to her  and for outpatient therapy.  As the patient was initially a voluntary admission, and is now declining admission, based on her reassessment it appears she would be safe to be discharged.  Her behaviors are somewhat chronic and she is unlikely to benefit from a short-term mental health admission unless she is feeling unsafe.      Disposition: Discharged home.  Follow-up with .  Utilize outpatient therapy.  Continue to work on coping skills.       Barney Garcia MD  12/14/19 2457

## 2019-12-15 ENCOUNTER — SURGERY - HEALTHEAST (OUTPATIENT)
Dept: GASTROENTEROLOGY | Facility: CLINIC | Age: 29
End: 2019-12-15

## 2019-12-17 ENCOUNTER — ANESTHESIA EVENT (OUTPATIENT)
Dept: SURGERY | Facility: CLINIC | Age: 29
End: 2019-12-17
Payer: COMMERCIAL

## 2019-12-17 ENCOUNTER — APPOINTMENT (OUTPATIENT)
Dept: CT IMAGING | Facility: CLINIC | Age: 29
End: 2019-12-17
Attending: EMERGENCY MEDICINE
Payer: COMMERCIAL

## 2019-12-17 ENCOUNTER — ANESTHESIA (OUTPATIENT)
Dept: SURGERY | Facility: CLINIC | Age: 29
End: 2019-12-17
Payer: COMMERCIAL

## 2019-12-17 ENCOUNTER — SURGERY - HEALTHEAST (OUTPATIENT)
Dept: GASTROENTEROLOGY | Facility: CLINIC | Age: 29
End: 2019-12-17

## 2019-12-17 ENCOUNTER — HOSPITAL ENCOUNTER (OUTPATIENT)
Facility: CLINIC | Age: 29
Setting detail: OBSERVATION
Discharge: HOME | End: 2019-12-18
Attending: EMERGENCY MEDICINE | Admitting: INTERNAL MEDICINE
Payer: COMMERCIAL

## 2019-12-17 DIAGNOSIS — T18.2XXA FOREIGN BODY IN STOMACH, INITIAL ENCOUNTER: ICD-10-CM

## 2019-12-17 DIAGNOSIS — T18.9XXA FOREIGN BODY IN DIGESTIVE TRACT, INITIAL ENCOUNTER: Primary | ICD-10-CM

## 2019-12-17 LAB
ANION GAP SERPL CALCULATED.3IONS-SCNC: 5 MMOL/L (ref 3–14)
APTT PPP: 40 SEC (ref 22–37)
BASOPHILS # BLD AUTO: 0.1 10E9/L (ref 0–0.2)
BASOPHILS NFR BLD AUTO: 0.6 %
BUN SERPL-MCNC: 10 MG/DL (ref 7–30)
CALCIUM SERPL-MCNC: 8.7 MG/DL (ref 8.5–10.1)
CHLORIDE SERPL-SCNC: 112 MMOL/L (ref 94–109)
CO2 SERPL-SCNC: 25 MMOL/L (ref 20–32)
CREAT SERPL-MCNC: 0.53 MG/DL (ref 0.52–1.04)
DIFFERENTIAL METHOD BLD: ABNORMAL
EOSINOPHIL # BLD AUTO: 0.2 10E9/L (ref 0–0.7)
EOSINOPHIL NFR BLD AUTO: 2.3 %
ERYTHROCYTE [DISTWIDTH] IN BLOOD BY AUTOMATED COUNT: 14.7 % (ref 10–15)
GFR SERPL CREATININE-BSD FRML MDRD: >90 ML/MIN/{1.73_M2}
GLUCOSE SERPL-MCNC: 94 MG/DL (ref 70–99)
HCT VFR BLD AUTO: 35.6 % (ref 35–47)
HGB BLD-MCNC: 11.1 G/DL (ref 11.7–15.7)
IMM GRANULOCYTES # BLD: 0 10E9/L (ref 0–0.4)
IMM GRANULOCYTES NFR BLD: 0.3 %
INR PPP: 1.15 (ref 0.86–1.14)
LYMPHOCYTES # BLD AUTO: 2.2 10E9/L (ref 0.8–5.3)
LYMPHOCYTES NFR BLD AUTO: 20.6 %
MCH RBC QN AUTO: 28.8 PG (ref 26.5–33)
MCHC RBC AUTO-ENTMCNC: 31.2 G/DL (ref 31.5–36.5)
MCV RBC AUTO: 93 FL (ref 78–100)
MONOCYTES # BLD AUTO: 0.7 10E9/L (ref 0–1.3)
MONOCYTES NFR BLD AUTO: 6.8 %
NEUTROPHILS # BLD AUTO: 7.3 10E9/L (ref 1.6–8.3)
NEUTROPHILS NFR BLD AUTO: 69.4 %
NRBC # BLD AUTO: 0 10*3/UL
NRBC BLD AUTO-RTO: 0 /100
PLATELET # BLD AUTO: 308 10E9/L (ref 150–450)
POTASSIUM SERPL-SCNC: 3.6 MMOL/L (ref 3.4–5.3)
RBC # BLD AUTO: 3.85 10E12/L (ref 3.8–5.2)
SODIUM SERPL-SCNC: 142 MMOL/L (ref 133–144)
WBC # BLD AUTO: 10.5 10E9/L (ref 4–11)

## 2019-12-17 PROCEDURE — 99285 EMERGENCY DEPT VISIT HI MDM: CPT | Mod: 25 | Performed by: EMERGENCY MEDICINE

## 2019-12-17 PROCEDURE — 86901 BLOOD TYPING SEROLOGIC RH(D): CPT | Performed by: EMERGENCY MEDICINE

## 2019-12-17 PROCEDURE — 37000009 ZZH ANESTHESIA TECHNICAL FEE, EACH ADDTL 15 MIN: Performed by: INTERNAL MEDICINE

## 2019-12-17 PROCEDURE — 80048 BASIC METABOLIC PNL TOTAL CA: CPT | Performed by: EMERGENCY MEDICINE

## 2019-12-17 PROCEDURE — 36000055 ZZH SURGERY LEVEL 2 W FLUORO 1ST 30 MIN - UMMC: Performed by: INTERNAL MEDICINE

## 2019-12-17 PROCEDURE — 85730 THROMBOPLASTIN TIME PARTIAL: CPT | Performed by: EMERGENCY MEDICINE

## 2019-12-17 PROCEDURE — 37000008 ZZH ANESTHESIA TECHNICAL FEE, 1ST 30 MIN: Performed by: INTERNAL MEDICINE

## 2019-12-17 PROCEDURE — 71000015 ZZH RECOVERY PHASE 1 LEVEL 2 EA ADDTL HR: Performed by: INTERNAL MEDICINE

## 2019-12-17 PROCEDURE — 36000053 ZZH SURGERY LEVEL 2 EA 15 ADDTL MIN - UMMC: Performed by: INTERNAL MEDICINE

## 2019-12-17 PROCEDURE — 85610 PROTHROMBIN TIME: CPT | Performed by: EMERGENCY MEDICINE

## 2019-12-17 PROCEDURE — 25000128 H RX IP 250 OP 636: Performed by: STUDENT IN AN ORGANIZED HEALTH CARE EDUCATION/TRAINING PROGRAM

## 2019-12-17 PROCEDURE — 99285 EMERGENCY DEPT VISIT HI MDM: CPT | Mod: Z6 | Performed by: EMERGENCY MEDICINE

## 2019-12-17 PROCEDURE — 86900 BLOOD TYPING SEROLOGIC ABO: CPT | Performed by: EMERGENCY MEDICINE

## 2019-12-17 PROCEDURE — 27210794 ZZH OR GENERAL SUPPLY STERILE: Performed by: INTERNAL MEDICINE

## 2019-12-17 PROCEDURE — 25800030 ZZH RX IP 258 OP 636: Performed by: STUDENT IN AN ORGANIZED HEALTH CARE EDUCATION/TRAINING PROGRAM

## 2019-12-17 PROCEDURE — 86850 RBC ANTIBODY SCREEN: CPT | Performed by: EMERGENCY MEDICINE

## 2019-12-17 PROCEDURE — 25000128 H RX IP 250 OP 636: Performed by: EMERGENCY MEDICINE

## 2019-12-17 PROCEDURE — 71000014 ZZH RECOVERY PHASE 1 LEVEL 2 FIRST HR: Performed by: INTERNAL MEDICINE

## 2019-12-17 PROCEDURE — 25000566 ZZH SEVOFLURANE, EA 15 MIN: Performed by: INTERNAL MEDICINE

## 2019-12-17 PROCEDURE — 71260 CT THORAX DX C+: CPT

## 2019-12-17 PROCEDURE — 96374 THER/PROPH/DIAG INJ IV PUSH: CPT | Mod: 59 | Performed by: EMERGENCY MEDICINE

## 2019-12-17 PROCEDURE — 85025 COMPLETE CBC W/AUTO DIFF WBC: CPT | Performed by: EMERGENCY MEDICINE

## 2019-12-17 RX ORDER — CEFAZOLIN SODIUM 2 G/100ML
2 INJECTION, SOLUTION INTRAVENOUS
Status: CANCELLED | OUTPATIENT
Start: 2019-12-17

## 2019-12-17 RX ORDER — IOPAMIDOL 755 MG/ML
135 INJECTION, SOLUTION INTRAVASCULAR ONCE
Status: COMPLETED | OUTPATIENT
Start: 2019-12-17 | End: 2019-12-17

## 2019-12-17 RX ORDER — CEFAZOLIN SODIUM 1 G/3ML
1 INJECTION, POWDER, FOR SOLUTION INTRAMUSCULAR; INTRAVENOUS SEE ADMIN INSTRUCTIONS
Status: CANCELLED | OUTPATIENT
Start: 2019-12-17

## 2019-12-17 RX ADMIN — SODIUM CHLORIDE, POTASSIUM CHLORIDE, SODIUM LACTATE AND CALCIUM CHLORIDE: 600; 310; 30; 20 INJECTION, SOLUTION INTRAVENOUS at 23:51

## 2019-12-17 RX ADMIN — MIDAZOLAM 2 MG: 1 INJECTION INTRAMUSCULAR; INTRAVENOUS at 23:49

## 2019-12-17 RX ADMIN — HYDROMORPHONE HYDROCHLORIDE 1 MG: 1 INJECTION, SOLUTION INTRAMUSCULAR; INTRAVENOUS; SUBCUTANEOUS at 22:32

## 2019-12-17 RX ADMIN — IOPAMIDOL 135 ML: 755 INJECTION, SOLUTION INTRAVENOUS at 23:17

## 2019-12-17 ASSESSMENT — MIFFLIN-ST. JEOR
SCORE: 1771.88
SCORE: 1766.88
SCORE: 1766.88

## 2019-12-17 ASSESSMENT — ENCOUNTER SYMPTOMS: ABDOMINAL PAIN: 1

## 2019-12-18 VITALS
HEIGHT: 62 IN | SYSTOLIC BLOOD PRESSURE: 108 MMHG | RESPIRATION RATE: 16 BRPM | TEMPERATURE: 98.1 F | BODY MASS INDEX: 44.16 KG/M2 | WEIGHT: 240 LBS | DIASTOLIC BLOOD PRESSURE: 74 MMHG | HEART RATE: 96 BPM | OXYGEN SATURATION: 95 %

## 2019-12-18 PROBLEM — T18.9XXA FOREIGN BODY IN DIGESTIVE SYSTEM: Status: ACTIVE | Noted: 2019-12-18

## 2019-12-18 LAB
ABO + RH BLD: ABNORMAL
ABO + RH BLD: ABNORMAL
BLD GP AB SCN SERPL QL: ABNORMAL
BLOOD BANK CMNT PATIENT-IMP: ABNORMAL
BLOOD BANK CMNT PATIENT-IMP: ABNORMAL
ERYTHROCYTE [DISTWIDTH] IN BLOOD BY AUTOMATED COUNT: 14.6 % (ref 10–15)
HCT VFR BLD AUTO: 37.1 % (ref 35–47)
HGB BLD-MCNC: 11.6 G/DL (ref 11.7–15.7)
MCH RBC QN AUTO: 28.9 PG (ref 26.5–33)
MCHC RBC AUTO-ENTMCNC: 31.3 G/DL (ref 31.5–36.5)
MCV RBC AUTO: 92 FL (ref 78–100)
PLATELET # BLD AUTO: 301 10E9/L (ref 150–450)
RBC # BLD AUTO: 4.02 10E12/L (ref 3.8–5.2)
SPECIMEN EXP DATE BLD: ABNORMAL
UPPER GI ENDOSCOPY: NORMAL
WBC # BLD AUTO: 9.5 10E9/L (ref 4–11)

## 2019-12-18 PROCEDURE — 25000132 ZZH RX MED GY IP 250 OP 250 PS 637: Performed by: ANESTHESIOLOGY

## 2019-12-18 PROCEDURE — 25000128 H RX IP 250 OP 636: Performed by: STUDENT IN AN ORGANIZED HEALTH CARE EDUCATION/TRAINING PROGRAM

## 2019-12-18 PROCEDURE — 85027 COMPLETE CBC AUTOMATED: CPT | Performed by: INTERNAL MEDICINE

## 2019-12-18 PROCEDURE — 25000125 ZZHC RX 250: Performed by: STUDENT IN AN ORGANIZED HEALTH CARE EDUCATION/TRAINING PROGRAM

## 2019-12-18 PROCEDURE — 99239 HOSP IP/OBS DSCHRG MGMT >30: CPT | Performed by: INTERNAL MEDICINE

## 2019-12-18 PROCEDURE — 25000125 ZZHC RX 250: Performed by: INTERNAL MEDICINE

## 2019-12-18 PROCEDURE — 25000128 H RX IP 250 OP 636: Performed by: INTERNAL MEDICINE

## 2019-12-18 PROCEDURE — 25800030 ZZH RX IP 258 OP 636: Performed by: STUDENT IN AN ORGANIZED HEALTH CARE EDUCATION/TRAINING PROGRAM

## 2019-12-18 PROCEDURE — G0378 HOSPITAL OBSERVATION PER HR: HCPCS

## 2019-12-18 PROCEDURE — 99236 HOSP IP/OBS SAME DATE HI 85: CPT | Mod: AI | Performed by: INTERNAL MEDICINE

## 2019-12-18 PROCEDURE — 25000132 ZZH RX MED GY IP 250 OP 250 PS 637: Performed by: INTERNAL MEDICINE

## 2019-12-18 PROCEDURE — 36592 COLLECT BLOOD FROM PICC: CPT | Performed by: INTERNAL MEDICINE

## 2019-12-18 RX ORDER — DIPHENHYDRAMINE HYDROCHLORIDE 50 MG/ML
INJECTION INTRAMUSCULAR; INTRAVENOUS PRN
Status: DISCONTINUED | OUTPATIENT
Start: 2019-12-18 | End: 2019-12-18

## 2019-12-18 RX ORDER — LIDOCAINE HYDROCHLORIDE 20 MG/ML
INJECTION, SOLUTION INFILTRATION; PERINEURAL PRN
Status: DISCONTINUED | OUTPATIENT
Start: 2019-12-18 | End: 2019-12-18

## 2019-12-18 RX ORDER — FENTANYL CITRATE 50 UG/ML
25-50 INJECTION, SOLUTION INTRAMUSCULAR; INTRAVENOUS EVERY 5 MIN PRN
Status: DISCONTINUED | OUTPATIENT
Start: 2019-12-18 | End: 2019-12-18 | Stop reason: HOSPADM

## 2019-12-18 RX ORDER — HEPARIN SODIUM,PORCINE 10 UNIT/ML
5-10 VIAL (ML) INTRAVENOUS
Status: DISCONTINUED | OUTPATIENT
Start: 2019-12-18 | End: 2019-12-18 | Stop reason: HOSPADM

## 2019-12-18 RX ORDER — PROPOFOL 10 MG/ML
INJECTION, EMULSION INTRAVENOUS PRN
Status: DISCONTINUED | OUTPATIENT
Start: 2019-12-18 | End: 2019-12-18

## 2019-12-18 RX ORDER — SUCRALFATE ORAL 1 G/10ML
1 SUSPENSION ORAL 4 TIMES DAILY
Status: DISCONTINUED | OUTPATIENT
Start: 2019-12-18 | End: 2019-12-18 | Stop reason: HOSPADM

## 2019-12-18 RX ORDER — SODIUM CHLORIDE, SODIUM LACTATE, POTASSIUM CHLORIDE, CALCIUM CHLORIDE 600; 310; 30; 20 MG/100ML; MG/100ML; MG/100ML; MG/100ML
INJECTION, SOLUTION INTRAVENOUS CONTINUOUS
Status: DISCONTINUED | OUTPATIENT
Start: 2019-12-18 | End: 2019-12-18 | Stop reason: HOSPADM

## 2019-12-18 RX ORDER — GABAPENTIN 600 MG/1
600 TABLET ORAL 3 TIMES DAILY
Status: DISCONTINUED | OUTPATIENT
Start: 2019-12-18 | End: 2019-12-18 | Stop reason: HOSPADM

## 2019-12-18 RX ORDER — HEPARIN SODIUM,PORCINE 10 UNIT/ML
5-10 VIAL (ML) INTRAVENOUS EVERY 24 HOURS
Status: DISCONTINUED | OUTPATIENT
Start: 2019-12-18 | End: 2019-12-18 | Stop reason: HOSPADM

## 2019-12-18 RX ORDER — ONDANSETRON 4 MG/1
4 TABLET, ORALLY DISINTEGRATING ORAL EVERY 6 HOURS PRN
Status: DISCONTINUED | OUTPATIENT
Start: 2019-12-18 | End: 2019-12-18 | Stop reason: HOSPADM

## 2019-12-18 RX ORDER — ALBUTEROL SULFATE 90 UG/1
2 AEROSOL, METERED RESPIRATORY (INHALATION) 4 TIMES DAILY PRN
Status: DISCONTINUED | OUTPATIENT
Start: 2019-12-18 | End: 2019-12-18 | Stop reason: HOSPADM

## 2019-12-18 RX ORDER — NALOXONE HYDROCHLORIDE 0.4 MG/ML
.1-.4 INJECTION, SOLUTION INTRAMUSCULAR; INTRAVENOUS; SUBCUTANEOUS
Status: DISCONTINUED | OUTPATIENT
Start: 2019-12-18 | End: 2019-12-18 | Stop reason: HOSPADM

## 2019-12-18 RX ORDER — DESVENLAFAXINE 50 MG/1
100 TABLET, FILM COATED, EXTENDED RELEASE ORAL DAILY
Status: DISCONTINUED | OUTPATIENT
Start: 2019-12-18 | End: 2019-12-18 | Stop reason: HOSPADM

## 2019-12-18 RX ORDER — FENTANYL CITRATE 50 UG/ML
INJECTION, SOLUTION INTRAMUSCULAR; INTRAVENOUS PRN
Status: DISCONTINUED | OUTPATIENT
Start: 2019-12-18 | End: 2019-12-18

## 2019-12-18 RX ORDER — ONDANSETRON 2 MG/ML
INJECTION INTRAMUSCULAR; INTRAVENOUS PRN
Status: DISCONTINUED | OUTPATIENT
Start: 2019-12-18 | End: 2019-12-18

## 2019-12-18 RX ORDER — CLONIDINE HYDROCHLORIDE 0.1 MG/1
0.1 TABLET ORAL 2 TIMES DAILY
Status: DISCONTINUED | OUTPATIENT
Start: 2019-12-18 | End: 2019-12-18 | Stop reason: HOSPADM

## 2019-12-18 RX ORDER — DEXAMETHASONE SODIUM PHOSPHATE 4 MG/ML
INJECTION, SOLUTION INTRA-ARTICULAR; INTRALESIONAL; INTRAMUSCULAR; INTRAVENOUS; SOFT TISSUE PRN
Status: DISCONTINUED | OUTPATIENT
Start: 2019-12-18 | End: 2019-12-18

## 2019-12-18 RX ORDER — TOPIRAMATE 100 MG/1
100 TABLET, FILM COATED ORAL
Status: DISCONTINUED | OUTPATIENT
Start: 2019-12-18 | End: 2019-12-18 | Stop reason: HOSPADM

## 2019-12-18 RX ORDER — ACETAMINOPHEN 325 MG/1
650 TABLET ORAL EVERY 4 HOURS PRN
Status: DISCONTINUED | OUTPATIENT
Start: 2019-12-18 | End: 2019-12-18 | Stop reason: HOSPADM

## 2019-12-18 RX ORDER — HEPARIN SODIUM (PORCINE) LOCK FLUSH IV SOLN 100 UNIT/ML 100 UNIT/ML
5 SOLUTION INTRAVENOUS
Status: DISCONTINUED | OUTPATIENT
Start: 2019-12-18 | End: 2019-12-18 | Stop reason: HOSPADM

## 2019-12-18 RX ORDER — BUSPIRONE HYDROCHLORIDE 10 MG/1
10 TABLET ORAL 2 TIMES DAILY
Status: DISCONTINUED | OUTPATIENT
Start: 2019-12-18 | End: 2019-12-18 | Stop reason: HOSPADM

## 2019-12-18 RX ORDER — ONDANSETRON 4 MG/1
4 TABLET, ORALLY DISINTEGRATING ORAL EVERY 30 MIN PRN
Status: DISCONTINUED | OUTPATIENT
Start: 2019-12-18 | End: 2019-12-18 | Stop reason: HOSPADM

## 2019-12-18 RX ORDER — DIPHENHYDRAMINE HYDROCHLORIDE 50 MG/ML
25 INJECTION INTRAMUSCULAR; INTRAVENOUS ONCE
Status: COMPLETED | OUTPATIENT
Start: 2019-12-18 | End: 2019-12-18

## 2019-12-18 RX ORDER — PANTOPRAZOLE SODIUM 40 MG/1
40 TABLET, DELAYED RELEASE ORAL
Qty: 62 TABLET | Refills: 0 | Status: SHIPPED | OUTPATIENT
Start: 2019-12-18 | End: 2020-01-18

## 2019-12-18 RX ORDER — HYDROXYZINE HYDROCHLORIDE 25 MG/1
25 TABLET, FILM COATED ORAL 3 TIMES DAILY PRN
Qty: 42 TABLET | Refills: 0 | Status: SHIPPED | OUTPATIENT
Start: 2019-12-18 | End: 2020-01-18

## 2019-12-18 RX ORDER — ONDANSETRON 2 MG/ML
4 INJECTION INTRAMUSCULAR; INTRAVENOUS EVERY 6 HOURS PRN
Status: DISCONTINUED | OUTPATIENT
Start: 2019-12-18 | End: 2019-12-18 | Stop reason: HOSPADM

## 2019-12-18 RX ORDER — OXYCODONE HYDROCHLORIDE 5 MG/1
5 TABLET ORAL EVERY 4 HOURS PRN
Status: DISCONTINUED | OUTPATIENT
Start: 2019-12-18 | End: 2019-12-18 | Stop reason: HOSPADM

## 2019-12-18 RX ORDER — LIDOCAINE HYDROCHLORIDE 20 MG/ML
15 SOLUTION OROPHARYNGEAL EVERY 6 HOURS PRN
Status: DISCONTINUED | OUTPATIENT
Start: 2019-12-18 | End: 2019-12-18 | Stop reason: HOSPADM

## 2019-12-18 RX ORDER — ONDANSETRON 2 MG/ML
4 INJECTION INTRAMUSCULAR; INTRAVENOUS EVERY 30 MIN PRN
Status: DISCONTINUED | OUTPATIENT
Start: 2019-12-18 | End: 2019-12-18 | Stop reason: HOSPADM

## 2019-12-18 RX ORDER — ACETAMINOPHEN 650 MG/1
650 SUPPOSITORY RECTAL EVERY 4 HOURS PRN
Status: DISCONTINUED | OUTPATIENT
Start: 2019-12-18 | End: 2019-12-18 | Stop reason: HOSPADM

## 2019-12-18 RX ORDER — SODIUM CHLORIDE, SODIUM LACTATE, POTASSIUM CHLORIDE, CALCIUM CHLORIDE 600; 310; 30; 20 MG/100ML; MG/100ML; MG/100ML; MG/100ML
INJECTION, SOLUTION INTRAVENOUS CONTINUOUS PRN
Status: DISCONTINUED | OUTPATIENT
Start: 2019-12-17 | End: 2019-12-18

## 2019-12-18 RX ORDER — EPHEDRINE SULFATE 50 MG/ML
INJECTION, SOLUTION INTRAMUSCULAR; INTRAVENOUS; SUBCUTANEOUS PRN
Status: DISCONTINUED | OUTPATIENT
Start: 2019-12-18 | End: 2019-12-18

## 2019-12-18 RX ADMIN — APIXABAN 5 MG: 5 TABLET, FILM COATED ORAL at 08:18

## 2019-12-18 RX ADMIN — FENTANYL CITRATE 50 MCG: 50 INJECTION, SOLUTION INTRAMUSCULAR; INTRAVENOUS at 01:24

## 2019-12-18 RX ADMIN — FENTANYL CITRATE 50 MCG: 50 INJECTION, SOLUTION INTRAMUSCULAR; INTRAVENOUS at 02:12

## 2019-12-18 RX ADMIN — FENTANYL CITRATE 25 MCG: 50 INJECTION, SOLUTION INTRAMUSCULAR; INTRAVENOUS at 00:58

## 2019-12-18 RX ADMIN — FENTANYL CITRATE 50 MCG: 50 INJECTION, SOLUTION INTRAMUSCULAR; INTRAVENOUS at 00:02

## 2019-12-18 RX ADMIN — FENTANYL CITRATE 50 MCG: 50 INJECTION, SOLUTION INTRAMUSCULAR; INTRAVENOUS at 01:42

## 2019-12-18 RX ADMIN — ONDANSETRON 4 MG: 2 INJECTION INTRAMUSCULAR; INTRAVENOUS at 01:24

## 2019-12-18 RX ADMIN — PROPOFOL 200 MG: 10 INJECTION, EMULSION INTRAVENOUS at 00:02

## 2019-12-18 RX ADMIN — CLONIDINE HYDROCHLORIDE 0.1 MG: 0.1 TABLET ORAL at 08:18

## 2019-12-18 RX ADMIN — ONDANSETRON 4 MG: 2 INJECTION INTRAMUSCULAR; INTRAVENOUS at 00:15

## 2019-12-18 RX ADMIN — FENTANYL CITRATE 50 MCG: 50 INJECTION, SOLUTION INTRAMUSCULAR; INTRAVENOUS at 01:58

## 2019-12-18 RX ADMIN — SUCRALFATE 1 G: 1 SUSPENSION ORAL at 08:18

## 2019-12-18 RX ADMIN — SODIUM CHLORIDE, POTASSIUM CHLORIDE, SODIUM LACTATE AND CALCIUM CHLORIDE: 600; 310; 30; 20 INJECTION, SOLUTION INTRAVENOUS at 01:00

## 2019-12-18 RX ADMIN — DIPHENHYDRAMINE HYDROCHLORIDE 50 MG: 50 INJECTION, SOLUTION INTRAMUSCULAR; INTRAVENOUS at 00:26

## 2019-12-18 RX ADMIN — DIPHENHYDRAMINE HYDROCHLORIDE 25 MG: 50 INJECTION, SOLUTION INTRAMUSCULAR; INTRAVENOUS at 03:11

## 2019-12-18 RX ADMIN — DEXAMETHASONE SODIUM PHOSPHATE 8 MG: 4 INJECTION, SOLUTION INTRA-ARTICULAR; INTRALESIONAL; INTRAMUSCULAR; INTRAVENOUS; SOFT TISSUE at 00:15

## 2019-12-18 RX ADMIN — SUCRALFATE 1 G: 1 SUSPENSION ORAL at 11:55

## 2019-12-18 RX ADMIN — DESVENLAFAXINE 100 MG: 50 TABLET, FILM COATED, EXTENDED RELEASE ORAL at 08:18

## 2019-12-18 RX ADMIN — GABAPENTIN 600 MG: 600 TABLET, FILM COATED ORAL at 08:18

## 2019-12-18 RX ADMIN — FENTANYL CITRATE 50 MCG: 50 INJECTION, SOLUTION INTRAMUSCULAR; INTRAVENOUS at 03:11

## 2019-12-18 RX ADMIN — OXYCODONE HYDROCHLORIDE 5 MG: 5 TABLET ORAL at 04:32

## 2019-12-18 RX ADMIN — SUGAMMADEX 400 MG: 100 INJECTION, SOLUTION INTRAVENOUS at 00:40

## 2019-12-18 RX ADMIN — Medication 5 ML: at 13:31

## 2019-12-18 RX ADMIN — DIPHENHYDRAMINE HYDROCHLORIDE 25 MG: 50 INJECTION, SOLUTION INTRAMUSCULAR; INTRAVENOUS at 02:32

## 2019-12-18 RX ADMIN — LIDOCAINE HYDROCHLORIDE 50 MG: 20 INJECTION, SOLUTION INFILTRATION; PERINEURAL at 00:02

## 2019-12-18 RX ADMIN — BUSPIRONE HYDROCHLORIDE 10 MG: 10 TABLET ORAL at 08:18

## 2019-12-18 RX ADMIN — Medication 100 MG: at 00:02

## 2019-12-18 RX ADMIN — FENTANYL CITRATE 50 MCG: 50 INJECTION, SOLUTION INTRAMUSCULAR; INTRAVENOUS at 00:28

## 2019-12-18 RX ADMIN — OXYCODONE HYDROCHLORIDE 5 MG: 5 TABLET ORAL at 08:59

## 2019-12-18 RX ADMIN — ROCURONIUM BROMIDE 50 MG: 10 INJECTION INTRAVENOUS at 00:05

## 2019-12-18 RX ADMIN — OMEPRAZOLE 40 MG: 20 CAPSULE, DELAYED RELEASE ORAL at 08:18

## 2019-12-18 RX ADMIN — Medication 10 MG: at 00:17

## 2019-12-18 ASSESSMENT — PAIN DESCRIPTION - DESCRIPTORS
DESCRIPTORS: ACHING
DESCRIPTORS: SHARP
DESCRIPTORS: ACHING
DESCRIPTORS: SHARP
DESCRIPTORS: ACHING
DESCRIPTORS: SHARP

## 2019-12-18 NOTE — ED PROVIDER NOTES
"    [facility] EMERGENCY DEPARTMENT ([facility])  12/17/19  History     Chief Complaint   Patient presents with     Swallowed Foreign Body     The history is provided by the patient and medical records.     Nevin Alvarado is a 29 year old female with a past medical history of anxiety, borderline personality disorder, depression, PTSD, self-injury, and recurrent episode of swallowing foreign bodies who presents to the Emergency Department for evaluation of ingestion of a foreign body.  Patient was seen at [facility] today for swelling to [facility] [place].  She was taken to the operating room to do an endoscopy and remove the foreign bodies and there was a concern that she may have had a perforation during the process.  Patient has a long history of swallowing foreign bodies.  Patient is otherwise complaining of epigastric discomfort at this time.        I have reviewed the Medications, Allergies, Past Medical and Surgical History, and Social History in the Epic system.    Review of Systems   Gastrointestinal: Positive for abdominal pain.       Physical Exam   BP: (!) 144/85  Pulse: 103  Heart Rate: 96  Temp: 98.9  F (37.2  C)  Resp: 18  Height: 157.5 cm (5' 2\")  Weight: 108.9 kg (240 lb)  SpO2: 99 %      Physical Exam  Constitutional:       General: She is not in acute distress.     Appearance: She is well-developed. She is not diaphoretic.      Comments: Comfortably resting, lying in bed, NAD, nondiaphoretic, lucid, fully conversant, no  respiratory distress, alert and oriented.     HENT:      Head: Normocephalic and atraumatic.      Nose: Nose normal.      Mouth/Throat:      Mouth: Mucous membranes are moist.      Pharynx: No oropharyngeal exudate.   Eyes:      General: No scleral icterus.     Pupils: Pupils are equal, round, and reactive to light.   Neck:      Musculoskeletal: Normal range of motion and neck supple.   Cardiovascular:      Rate and Rhythm: Normal rate.      Pulses: " Normal pulses.      Heart sounds: Normal heart sounds.   Pulmonary:      Effort: Pulmonary effort is normal. No respiratory distress.   Abdominal:      General: Bowel sounds are normal.      Palpations: Abdomen is soft.      Tenderness: There is no abdominal tenderness.   Musculoskeletal:         General: No tenderness.   Skin:     General: Skin is warm and dry.      Capillary Refill: Capillary refill takes less than 2 seconds.      Coloration: Skin is not pale.      Findings: No erythema or rash.   Neurological:      Mental Status: She is alert and oriented to person, place, and time.         ED Course   9:54 PM  The patient was seen and examined by Jeffy Velasquez MD in Room ED10.   Medications   HYDROmorphone (DILAUDID) injection 1 mg ( Intravenous Auto Hold 12/17/19 2350)   simethicone 133mg/2mL oral suspension (2 mLs Oral Given 12/18/19 0025)   lactated ringers infusion ( Intravenous New Bag 12/18/19 0100)   ondansetron (ZOFRAN-ODT) ODT tab 4 mg ( Oral See Alternative 12/18/19 0124)     Or   ondansetron (ZOFRAN) injection 4 mg (4 mg Intravenous Given 12/18/19 0124)   prochlorperazine (COMPAZINE) injection 10 mg (has no administration in time range)   fentaNYL (PF) (SUBLIMAZE) injection 25-50 mcg (50 mcg Intravenous Given 12/18/19 0142)   iopamidol (ISOVUE-370) solution 135 mL (135 mLs Intravenous Given 12/17/19 2317)   sodium chloride (PF) 0.9% PF flush 90 mL (90 mLs Intravenous Given 12/17/19 2317)        Procedures             Critical Care time:  none     Results for orders placed or performed during the hospital encounter of 12/17/19   UPPER GI ENDOSCOPY     Status: None   Result Value Ref Range    Upper GI Endoscopy       [address] [place] [address] [phone]     Endoscopy Department  _______________________________________________________________________________  Patient Name: Nevin Alvarado     Procedure Date: 12/18/2019 12:02 AM  MRN: [id number]                        Account Number: [id number]  YOB: 1990             Admit Type: Outpatient  Age: 29                                Gender: Female  Note Status: Finalized                Attending MD: Pamela Perez MD  Total Sedation Time:                    _______________________________________________________________________________     Procedure:           Upper GI endoscopy  Indications:         Foreign body in the stomach  Providers:           Pamela Perez MD, Keith Valdez MD  Patient Profile:     29 year old female with bipolar disorder, complex                        psychiatric history, multiple foreign body ingestions,                        recently complicated w ith esophageal perforation                        requiring esophageal stent placement (now s/p stent                        removal), and prior rectal foreign body insertion                        requiring procedural removal who is transferred from [facility]                        due to concern for iatrogenic distal esophageal                        perforation after failed EGD for foreign body removal.                        Patient reports that earlier today she swallowed two                        [place] tree ornament hooks which she straightened                        prior to ingestion. She reports her last meal was a                        whole pizza around 11 a.m. on 12/17/19.  Referring MD:          Medicines:           General Anesthesia. Procedure time 22 minutes.  Complications:       No immediate complications.  _______________________________________________________________________________  Procedure:           Pre-Anesthesia Assessment:                       - Prior to t he procedure, a History and Physical was                        performed, and patient medications and allergies were                        reviewed. The patient is competent. The risks and                        benefits of the  procedure and the sedation options and                        risks were discussed with the patient. All questions                        were answered and informed consent was obtained. Patient                        identification and proposed procedure were verified by                        the physician, the nurse and the anesthesiologist in the                        procedure room. Mental Status Examination: normal.                        Airway Examination: normal oropharyngeal airway and neck                        mobility. Respiratory Examination: clear to                        auscultation. CV Examination: normal. Prophylactic                        Antibiotics: The patient does not require prophylactic                        antibiotics. Prior An ticoagulants: The patient has taken                        Eliquis (apixaban), last dose was day of procedure. ASA                        Grade Assessment: II - A patient with mild systemic                        disease. After reviewing the risks and benefits, the                        patient was deemed in satisfactory condition to undergo                        the procedure. The anesthesia plan was to use general                        anesthesia. Immediately prior to administration of                        medications, the patient was re-assessed for adequacy to                        receive sedatives. The heart rate, respiratory rate,                        oxygen saturations, blood pressure, adequacy of                        pulmonary ventilation, and response to care were                        monitored throughout the procedure. The physical status                        of the patient was re-assessed after the procedure.                       After obtaining informed cons ent, the endoscope was                        passed under direct vision. Throughout the procedure,                        the patient's blood pressure, pulse, and oxygen                         saturations were monitored continuously. The Endoscope                        was introduced through the mouth, and advanced to the                        second part of duodenum. The upper GI endoscopy was                        accomplished without difficulty. The patient tolerated                        the procedure well.                                                                                   Findings:       Esophagogastric landmarks were identified: the Z-line was found at 38 cm        from the incisors.       A non-bleeding mucosal tear that was pre-existing (pre-procedure) was        found in the distal esophagus just above the area of granulation tissue        (from prior esophageal stent).       One benign-appearing, intrinsic moderate circumferential scarring with        gran ualation tissue was found 38 cm from the incisors. This area was        traversed with an adult endoscope. This is felt to be granulation tissue        seen at the site of the edge of her prior esophageal stent and was felt        to be similar in appearance to previous EGDs by the thoracic surgery        team who most recently scoped her.       The exam of the esophagus was otherwise normal.       A small amount of liquid food (residue) was found in the gastric fundus.        This was suctioned.       Two ornament hooks were found in the gastric body. Successfully removed        with a rat toothed forceps aided by a retrieval simons protector        (diphenhydramine was given as premedication prior to use of latex simons        given patient's reported latex allergy leading to skin rash).       A few localized, small non-bleeding erosions were found in the stomach        likely related to swallowed hooks. There were no stigmata of recent        bleeding.       The exam of the stomach was  otherwise normal.       The examined duodenum was normal.                                                                                    Impression:          - Esophagogastric landmarks identified.                       - Superficial-appearing mucosal tear in the distal                        esophagus.                       - Benign-appearing esophageal scarring with granulation                        tissues.                       - A small amount of liquid food (residue) in the stomach.                       - Two ornament hooks were found in the stomach.                        Retrieved.                       - Non-bleeding erosive gastropathy.                       - Normal examined duodenum.  Recommendation:      - Ad[facility] the patient to [facility] for as per                        cardiothoracic sugery recommendations.                       - Resume regular diet.                       - Use Prilosec (omeprazole) 40 mg PO BID.                       - Recommend psychiatric adenike luation given recent increase                        in foreign body ingestions in the last month or so (with                        several in the last two weeks).                       - The findings and recommendations were discussed with                        the medicine triage physician.                                                                                     Pamela Perez MD  _______________________  Pamela Perez MD  12/18/2019 1:12:55 AM  I was physically present for the entire viewing portion of the exam.  __________________________  Signature of teaching physician  B4c/Paulina Perez MD    __________________  Keith Valdez MD  Number of Addenda: 0    Note Initiated On: 12/18/2019 12:02 AM  Scope In:  Scope Out:     CBC with platelets differential     Status: Abnormal   Result Value Ref Range    WBC 10.5 4.0 - 11.0 10e9/L    RBC Count 3.85 3.8 - 5.2 10e12/L    Hemoglobin 11.1 (L) 11.7 - 15.7 g/dL    Hematocrit 35.6 35.0 - 47.0 %    MCV 93 78 - 100 fl    MCH 28.8 26.5 - 33.0 pg    MCHC 31.2 (L) 31.5 - 36.5 g/dL     RDW 14.7 10.0 - 15.0 %    Platelet Count 308 150 - 450 10e9/L    Diff Method Automated Method     % Neutrophils 69.4 %    % Lymphocytes 20.6 %    % Monocytes 6.8 %    % Eosinophils 2.3 %    % Basophils 0.6 %    % Immature Granulocytes 0.3 %    Nucleated RBCs 0 0 /100    Absolute Neutrophil 7.3 1.6 - 8.3 10e9/L    Absolute Lymphocytes 2.2 0.8 - 5.3 10e9/L    Absolute Monocytes 0.7 0.0 - 1.3 10e9/L    Absolute Eosinophils 0.2 0.0 - 0.7 10e9/L    Absolute Basophils 0.1 0.0 - 0.2 10e9/L    Abs Immature Granulocytes 0.0 0 - 0.4 10e9/L    Absolute Nucleated RBC 0.0    Basic metabolic panel     Status: Abnormal   Result Value Ref Range    Sodium 142 133 - 144 mmol/L    Potassium 3.6 3.4 - 5.3 mmol/L    Chloride 112 (H) 94 - 109 mmol/L    Carbon Dioxide 25 20 - 32 mmol/L    Anion Gap 5 3 - 14 mmol/L    Glucose 94 70 - 99 mg/dL    Urea Nitrogen 10 7 - 30 mg/dL    Creatinine 0.53 0.52 - 1.04 mg/dL    GFR Estimate >90 >60 mL/min/[1.73_m2]    GFR Estimate If Black >90 >60 mL/min/[1.73_m2]    Calcium 8.7 8.5 - 10.1 mg/dL   INR     Status: Abnormal   Result Value Ref Range    INR 1.15 (H) 0.86 - 1.14   Partial thromboplastin time     Status: Abnormal   Result Value Ref Range    PTT 40 (H) 22 - 37 sec   ABO/Rh type and screen     Status: Abnormal   Result Value Ref Range    ABO O     RH(D) Pos     Antibody Screen Pos (A)     Test Valid Only At          [facility]    Specimen Expires 12/20/2019     Blood Bank Comment       Delay in availability of Red Blood Cells called to  Julita Mcdaniel on 12/18/19 at 0026       I have reviewed the patient's prior chart including recent labs, ER visits, and available inpatient discharge summaries if available.            Labs Ordered and Resulted from Time of ED Arrival Up to the Time of Departure from the ED   CBC WITH PLATELETS DIFFERENTIAL - Abnormal; Notable for the following components:       Result Value    Hemoglobin 11.1 (*)     MCHC 31.2 (*)      All other components within normal limits   BASIC METABOLIC PANEL - Abnormal; Notable for the following components:    Chloride 112 (*)     All other components within normal limits   INR - Abnormal; Notable for the following components:    INR 1.15 (*)     All other components within normal limits   PARTIAL THROMBOPLASTIN TIME - Abnormal; Notable for the following components:    PTT 40 (*)     All other components within normal limits     Results for orders placed or performed during the hospital encounter of 12/17/19 (from the past 24 hour(s))   CBC with platelets differential   Result Value Ref Range    WBC 10.5 4.0 - 11.0 10e9/L    RBC Count 3.85 3.8 - 5.2 10e12/L    Hemoglobin 11.1 (L) 11.7 - 15.7 g/dL    Hematocrit 35.6 35.0 - 47.0 %    MCV 93 78 - 100 fl    MCH 28.8 26.5 - 33.0 pg    MCHC 31.2 (L) 31.5 - 36.5 g/dL    RDW 14.7 10.0 - 15.0 %    Platelet Count 308 150 - 450 10e9/L    Diff Method Automated Method     % Neutrophils 69.4 %    % Lymphocytes 20.6 %    % Monocytes 6.8 %    % Eosinophils 2.3 %    % Basophils 0.6 %    % Immature Granulocytes 0.3 %    Nucleated RBCs 0 0 /100    Absolute Neutrophil 7.3 1.6 - 8.3 10e9/L    Absolute Lymphocytes 2.2 0.8 - 5.3 10e9/L    Absolute Monocytes 0.7 0.0 - 1.3 10e9/L    Absolute Eosinophils 0.2 0.0 - 0.7 10e9/L    Absolute Basophils 0.1 0.0 - 0.2 10e9/L    Abs Immature Granulocytes 0.0 0 - 0.4 10e9/L    Absolute Nucleated RBC 0.0    Basic metabolic panel   Result Value Ref Range    Sodium 142 133 - 144 mmol/L    Potassium 3.6 3.4 - 5.3 mmol/L    Chloride 112 (H) 94 - 109 mmol/L    Carbon Dioxide 25 20 - 32 mmol/L    Anion Gap 5 3 - 14 mmol/L    Glucose 94 70 - 99 mg/dL    Urea Nitrogen 10 7 - 30 mg/dL    Creatinine 0.53 0.52 - 1.04 mg/dL    GFR Estimate >90 >60 mL/min/[1.73_m2]    GFR Estimate If Black >90 >60 mL/min/[1.73_m2]    Calcium 8.7 8.5 - 10.1 mg/dL   ABO/Rh type and screen   Result Value Ref Range    ABO O     RH(D) Pos     Antibody Screen Pos (A)      Test Valid Only At          [facility]    Specimen Expires 12/20/2019     Blood Bank Comment       Delay in availability of Red Blood Cells called to  Julita Jonas on 12/18/19 at 0026     INR   Result Value Ref Range    INR 1.15 (H) 0.86 - 1.14   Partial thromboplastin time   Result Value Ref Range    PTT 40 (H) 22 - 37 sec   CT Chest/Abdomen/Pelvis w Contrast    Narrative    EXAM: CT CHEST/ABDOMEN/PELVIS W CONTRAST  LOCATION: [facility]  DATE/TIME: 12/17/2019 11:22 PM    INDICATION: Esophageal perforation. History of prior perforation. Patient swallowed a foreign body-a [place] tree ornament .  COMPARISON: 12/04/2019.  TECHNIQUE: CT scan of the chest, abdomen, and pelvis was performed before and after injection of IV contrast. Multiplanar reformats were obtained. Dose reduction techniques were used.  CONTRAST: 134 mL of Isovue-370.    FINDINGS:   LUNGS AND PLEURA: No acute infiltrates or pleural effusions.    MEDIASTINUM/AXILLAE: The esophagus appears diffusely slightly thick walled and mildly patulous in the upper esophagus but there are no clear extraluminal fluid collections and no mediastinal gas. The wall thickening is most prominent in the upper   esophagus along the right side at the level of the aortic arch. No pathologic adenopathy. Right chest wall port with catheter tip in the RA.    HEPATOBILIARY: Within normal limits.    PANCREAS: Normal.    SPLEEN: Normal.    ADRENAL GLANDS: Normal.    KIDNEYS/BLADDER: Within normal limits. No hydronephrosis.    BOWEL: There is a triangular-shaped metallic foreign body within the fundus of the stomach. The arms extend across the stomach, tenting the gastric walls but probably not extending through the stomach. The extended arms in the upper stomach measure   approximately 7.4 cm across. It is difficult to completely exclude that any of the arms extend slightly through the gastric wall but there are  no adjacent fluid collections, inflammation or free air. No bowel obstruction or other abnormality.    LYMPH NODES: Normal.    VASCULATURE: Unremarkable.    PELVIC ORGANS: Uterus is present with IUD in place. 2.4 cm right ovarian cyst, likely physiologic.    OTHER: No ascites. 2.7 cm nodule in the right anterior abdominal wall subcutaneous fat and a smaller nodule at the same level on the left. These may be subcutaneous injection sites or small hematomas. Correlate clinically.    MUSCULOSKELETAL: Mild thoracolumbar curve.      Impression    IMPRESSION:  1.  Mildly thick-walled, slightly patulous esophagus without mediastinal fluid collections or air.  2. Linear metallic density in the stomach compatible with the swallowed foreign body. The arms extend across, tenting the walls of the gastric fundus, but without convincing perforation.    Findings discussed with the referring physician at 11:45 PM.   UPPER GI ENDOSCOPY   Result Value Ref Range    Upper GI Endoscopy       [address] [place] [address] [phone]     Endoscopy Department  _______________________________________________________________________________  Patient Name: Nevin Alvarado     Procedure Date: 12/18/2019 12:02 AM  MRN: [id number]                       Account Number: [id number]  YOB: 1990             Admit Type: Outpatient  Age: 29                                Gender: Female  Note Status: Finalized                Attending MD: Pamela Perez MD  Total Sedation Time:                    _______________________________________________________________________________     Procedure:           Upper GI endoscopy  Indications:         Foreign body in the stomach  Providers:           Pamela Perez MD, Keith Valdez MD  Patient Profile:     29 year old female with bipolar disorder, complex                        psychiatric history, multiple foreign body ingestions,                         recently complicated w ith esophageal perforation                        requiring esophageal stent placement (now s/p stent                        removal), and prior rectal foreign body insertion                        requiring procedural removal who is transferred from [facility]                        due to concern for iatrogenic distal esophageal                        perforation after failed EGD for foreign body removal.                        Patient reports that earlier today she swallowed two                        [place] tree ornament hooks which she straightened                        prior to ingestion. She reports her last meal was a                        whole pizza around 11 a.m. on 12/17/19.  Referring MD:          Medicines:           General Anesthesia. Procedure time 22 minutes.  Complications:       No immediate complications.  _______________________________________________________________________________  Procedure:           Pre-Anesthesia Assessment:                       - Prior to t he procedure, a History and Physical was                        performed, and patient medications and allergies were                        reviewed. The patient is competent. The risks and                        benefits of the procedure and the sedation options and                        risks were discussed with the patient. All questions                        were answered and informed consent was obtained. Patient                        identification and proposed procedure were verified by                        the physician, the nurse and the anesthesiologist in the                        procedure room. Mental Status Examination: normal.                        Airway Examination: normal oropharyngeal airway and neck                        mobility. Respiratory Examination: clear to                        auscultation. CV Examination: normal. Prophylactic                         Antibiotics: The patient does not require prophylactic                        antibiotics. Prior An ticoagulants: The patient has taken                        Eliquis (apixaban), last dose was day of procedure. ASA                        Grade Assessment: II - A patient with mild systemic                        disease. After reviewing the risks and benefits, the                        patient was deemed in satisfactory condition to undergo                        the procedure. The anesthesia plan was to use general                        anesthesia. Immediately prior to administration of                        medications, the patient was re-assessed for adequacy to                        receive sedatives. The heart rate, respiratory rate,                        oxygen saturations, blood pressure, adequacy of                        pulmonary ventilation, and response to care were                        monitored throughout the procedure. The physical status                        of the patient was re-assessed after the procedure.                       After obtaining informed cons ent, the endoscope was                        passed under direct vision. Throughout the procedure,                        the patient's blood pressure, pulse, and oxygen                        saturations were monitored continuously. The Endoscope                        was introduced through the mouth, and advanced to the                        second part of duodenum. The upper GI endoscopy was                        accomplished without difficulty. The patient tolerated                        the procedure well.                                                                                   Findings:       Esophagogastric landmarks were identified: the Z-line was found at 38 cm        from the incisors.       A non-bleeding mucosal tear that was pre-existing (pre-procedure) was        found in the distal esophagus just above the  area of granulation tissue        (from prior esophageal stent).       One benign-appearing, intrinsic moderate circumferential scarring with        gran ualation tissue was found 38 cm from the incisors. This area was        traversed with an adult endoscope. This is felt to be granulation tissue        seen at the site of the edge of her prior esophageal stent and was felt        to be similar in appearance to previous EGDs by the thoracic surgery        team who most recently scoped her.       The exam of the esophagus was otherwise normal.       A small amount of liquid food (residue) was found in the gastric fundus.        This was suctioned.       Two ornament hooks were found in the gastric body. Successfully removed        with a rat toothed forceps aided by a retrieval simons protector        (diphenhydramine was given as premedication prior to use of latex simons        given patient's reported latex allergy leading to skin rash).       A few localized, small non-bleeding erosions were found in the stomach        likely related to swallowed hooks. There were no stigmata of recent        bleeding.       The exam of the stomach was  otherwise normal.       The examined duodenum was normal.                                                                                   Impression:          - Esophagogastric landmarks identified.                       - Superficial-appearing mucosal tear in the distal                        esophagus.                       - Benign-appearing esophageal scarring with granulation                        tissues.                       - A small amount of liquid food (residue) in the stomach.                       - Two ornament hooks were found in the stomach.                        Retrieved.                       - Non-bleeding erosive gastropathy.                       - Normal examined duodenum.  Recommendation:      - Admit the patient to hospital [facility] for as per                         cardiothoracic sugery recommendations.                       - Resume regular diet.                       - Use Prilosec (omeprazole) 40 mg PO BID.                       - Recommend psychiatric adenike luation given recent increase                        in foreign body ingestions in the last month or so (with                        several in the last two weeks).                       - The findings and recommendations were discussed with                        the medicine triage physician.                                                                                     Pamela Perez MD  _______________________  Pamela Perez MD  12/18/2019 1:12:55 AM  I was physically present for the entire viewing portion of the exam.  __________________________  Signature of teaching physician  BERNIEc/Paulina Perez MD    __________________  Keith Valdez MD  Number of Addenda: 0    Note Initiated On: 12/18/2019 12:02 AM  Scope In:  Scope Out:            Assessments & Plan (with Medical Decision Making)   This is a 29-year-old female patient coming into the emergency room from an outside facility where she was being seen for swallowing to ornament hooks.  This patient well-known to our emergency room for swallowing foreign bodies.  She was just seen 3 days ago for similar event.  At this outside facility she was having an EGD performed and they were concerned that they may have perforated her esophagus so now she is being seen by GI and thoracic surgery.  Upon presentation she is in no obvious distress.  Her vital signs are otherwise stable.  She is provided with IV Dilaudid for discomfort and labs are drawn.  GI and thoracic surgery saw the patient and she was taken to the operating room to have the foreign bodies removed.  At this time she is otherwise stable.  GI and thoracic surgery have left recommendations and their current recommendation is for the patient to be admitted to medicine with a  "psych consult.      I have reviewed the nursing notes.    I have reviewed the findings, diagnosis, plan and need for follow up with the patient.    Current Discharge Medication List          Final diagnoses:   Foreign body in stomach, initial encounter   I, Maximus Osborn, am serving as a trained medical scribe to document services personally performed by Jeffy Velasquez MD, based on the provider's statements to me.      I, Jeffy Velasquez MD, was physically present and have reviewed and verified the accuracy of this note documented by Maximus Osborn.     \"This dictation was performed with the assistance of voice recognition software and may contain inadvertant transcription  errors,  omissions and/or  inadvertent word substitution.\" --Jeffy Velasquez MD     12/17/2019   [facility], [place], EMERGENCY DEPARTMENT     Jeffy Velasquez MD  12/18/19 0214    "

## 2019-12-18 NOTE — H&P
[facility], [place]    History and Physical - Hospitalist Service       Date of Admission:  12/17/2019    Assessment & Plan   Nevin Alvarado is a 29 year old female w hx of complex psychosocial history, depression and multiple impulsive foreign body ingestions who was admitted on 12/17/2019 after another episode of foreign body ingestion s/p EGD and removal.     Recurrent impulsive foreign body ingestion: has hx of multiple foreign body ingestions/insertions with recent esophageal stent placement 10/9/2019 (s/p removal) for esophageal perforation. This time, ingested xmas ornament hooks and presented OSH. She was transferred due to concern for perforation. Underwent EGD w GI and vascular standby. Per report, no perforation and FB were removed successfully. Patient denied this as suicidal attempt, but rather unable to control the urge to ingest. Having throat and epigastric discomfort in PACU.  - s/p EGD w removal: await formal report  - clear diet, advance as tolerated (currently taking regular diet at [facility])  - resume po meds  - sucralfate QID  - add protonix  - psych consult in am (ordered), request bedside attendant for now    Recent provoked R interlobar/lower lobe PE on eliquis: Diagnosed in late Nov 2019 when she presented w hemoptysis. attributed to recent surgery and hospitalizations leading to prolonged immobilization. No DVT found. Plan to continue for 3 months per note (through the end of Feb 2020)  - plan to resume eliquis in am    Complex psychocosocial history  Depression  anxiety  hx of PTSD  Denied her mood being worse. Denied recent change in her meds. Takes multiple psych meds, and followed by outside psychiatrist. She lives in an apartment by herself, but her sister lives in the next room.  - bedside attendant and psych consult as above  - continue home psych meds: buspar, clonidine, neurontin, latuda, topiramate  - per patient, she has appointment to start home  therapy in early Jan. Has appt with care coodinator etc on 12/19.     Diet: clear diet, advance to regular diet as tolerated  DVT Prophylaxis: on eliquis  Hazel Catheter: not present  Code Status: full    Disposition Plan   Expected discharge: Tomorrow, recommended to prior living arrangement once stable post EGD and able to tolerate po and pain controlled, also need to be seen by psychiatry.  Entered: Barbara Tong MD 12/18/2019, 2:08 AM     The patient's care was discussed with the Bedside Nurse and Patient.    Barbara Tong MD  [facility], [place]    ______________________________________________________________________    Chief Complaint   Foreign body ingestion, referred by [facility] for concern for esophageal perforation    History is obtained from the patient and chart.    History of Present Illness   Nevin Alvarado is a 29 year old woman w hx of complex psychosocial history, depression and multiple impulsive foreign body ingestions who was admitted on 12/17/2019 after another episode of foreign body ingestion s/p EGD and removal. She lives alone and was decorating [place] tree with ornaments. She saw multiple hooks and felt the urge to ingest. She developed throat pain and presented to [facility] ED on 12/17. Of note, she presented to outside ED repetitively for the same; [facility] ED on 12/15, and [facility] on 12/16. She was last seen at [facility] on 12/13.  She denied intention to hurt herself but rather an impulsiveness to do so.   After being transferred from [facility] for concern for esophageal perforation, she was seen by GI and underwent EGD 12/17. No perforation was found. She was admitted to observation for close monitoring.      Review of Systems    The 10 point Review of Systems is negative other than noted in the HPI or here.    Past Medical History    I have reviewed this patient's medical history and updated it with pertinent information if needed.   Past Medical  History:   Diagnosis Date     ADD (attention deficit disorder)      Anorexia nervosa with bulimia     history of; on Topamax     Anxiety      Borderline personality disorder (H)      Depression      Depressive disorder      H/O self-harm      Lives in independent group home     due to debilitating mental illness     Migraine without aura     no known triggers; on Topamax bid and Imitrex PRN     Morbid obesity (H)      PTSD (post-traumatic stress disorder)      Rectal foreign body - Recurrent issue, self placed      Swallowed foreign body - Recurrent issue, self placed        Past Surgical History   I have reviewed this patient's surgical history and updated it with pertinent information if needed.  Past Surgical History:   Procedure Laterality Date     COMBINED ESOPHAGOSCOPY, GASTROSCOPY, DUODENOSCOPY (EGD), REPLACE ESOPHAGEAL STENT N/A 10/9/2019    Procedure: Upper Endoscopy with Suture Placement;  Surgeon: Zurdo Ramirez MD;  Location: [facility]     ESOPHAGOSCOPY, GASTROSCOPY, DUODENOSCOPY (EGD), COMBINED N/A 3/9/2017    Procedure: COMBINED ESOPHAGOSCOPY, GASTROSCOPY, DUODENOSCOPY (EGD), REMOVE FOREIGN BODY;  Surgeon: Avis Guzmán MD;  Location: [facility]     ESOPHAGOSCOPY, GASTROSCOPY, DUODENOSCOPY (EGD), COMBINED N/A 4/20/2017    Procedure: COMBINED ESOPHAGOSCOPY, GASTROSCOPY, DUODENOSCOPY (EGD), REMOVE FOREIGN BODY;  EGD removal Foregin body;  Surgeon: Lokesh Paula MD;  Location: [facility]     ESOPHAGOSCOPY, GASTROSCOPY, DUODENOSCOPY (EGD), COMBINED N/A 6/12/2017    Procedure: COMBINED ESOPHAGOSCOPY, GASTROSCOPY, DUODENOSCOPY (EGD);  COMBINED ESOPHAGOSCOPY, GASTROSCOPY, DUODENOSCOPY (EGD) [[id number]]attempted removal of foreign body;  Surgeon: Pamela Perez MD;  [facility]: [facility]     ESOPHAGOSCOPY, GASTROSCOPY, DUODENOSCOPY (EGD), COMBINED N/A 6/9/2017    Procedure: COMBINED ESOPHAGOSCOPY, GASTROSCOPY, DUODENOSCOPY (EGD), REMOVE FOREIGN BODY;  Esophagoscopy, Gastroscopy,  Duodenoscopy, Removal of Foreign Body;  Surgeon: Dejon Marsh MD;  [facility]: [facility]     ESOPHAGOSCOPY, GASTROSCOPY, DUODENOSCOPY (EGD), COMBINED N/A 1/6/2018    Procedure: COMBINED ESOPHAGOSCOPY, GASTROSCOPY, DUODENOSCOPY (EGD), REMOVE FOREIGN BODY;  COMBINED ESOPHAGOSCOPY, GASTROSCOPY, DUODENOSCOPY (EGD) [by pascal net and snare with profol sedation;  Surgeon: Feliciano Emmanuel MD;  Location:  GI     ESOPHAGOSCOPY, GASTROSCOPY, DUODENOSCOPY (EGD), COMBINED N/A 3/19/2018    Procedure: COMBINED ESOPHAGOSCOPY, GASTROSCOPY, DUODENOSCOPY (EGD), REMOVE FOREIGN BODY;   Esophagodscopy, Gastroscopy, Duodenoscopy,Foreign Body Removal;  Surgeon: Brice Guzmán MD;  [facility]     ESOPHAGOSCOPY, GASTROSCOPY, DUODENOSCOPY (EGD), COMBINED N/A 4/16/2018    Procedure: COMBINED ESOPHAGOSCOPY, GASTROSCOPY, DUODENOSCOPY (EGD), REMOVE FOREIGN BODY;  Esophagogastroduodenoscopy  Foreign Body Removal X 2;  Surgeon: Royer Moise MD;  [facility]     ESOPHAGOSCOPY, GASTROSCOPY, DUODENOSCOPY (EGD), COMBINED N/A 6/1/2018    Procedure: COMBINED ESOPHAGOSCOPY, GASTROSCOPY, DUODENOSCOPY (EGD), REMOVE FOREIGN BODY;  COMBINED ESOPHAGOSCOPY, GASTROSCOPY, DUODENOSCOPY with removal of foreign body, propofol sedation by anesthesia;  Surgeon: Brice Martinez MD;  Location:  GI     ESOPHAGOSCOPY, GASTROSCOPY, DUODENOSCOPY (EGD), COMBINED N/A 7/25/2018    Procedure: COMBINED ESOPHAGOSCOPY, GASTROSCOPY, DUODENOSCOPY (EGD), REMOVE FOREIGN BODY;;  Surgeon: Candy Castelan MD;  Location:  GI     ESOPHAGOSCOPY, GASTROSCOPY, DUODENOSCOPY (EGD), COMBINED N/A 7/28/2018    Procedure: COMBINED ESOPHAGOSCOPY, GASTROSCOPY, DUODENOSCOPY (EGD), REMOVE FOREIGN BODY;  COMBINED ESOPHAGOSCOPY, GASTROSCOPY, DUODENOSCOPY (EGD), REMOVE FOREIGN BODY;  Surgeon: Brice Guzmán MD;  [facility]     ESOPHAGOSCOPY, GASTROSCOPY, DUODENOSCOPY (EGD), COMBINED N/A 7/31/2018    Procedure: COMBINED ESOPHAGOSCOPY,  GASTROSCOPY, DUODENOSCOPY (EGD);  COMBINED ESOPHAGOSCOPY, GASTROSCOPY, DUODENOSCOPY (EGD) TO REMOVE FOREIGN BODY;  Surgeon: Lokesh Paula MD;  [facility]     ESOPHAGOSCOPY, GASTROSCOPY, DUODENOSCOPY (EGD), COMBINED N/A 8/4/2018    Procedure: COMBINED ESOPHAGOSCOPY, GASTROSCOPY, DUODENOSCOPY (EGD), REMOVE FOREIGN BODY;   combined esophagoscopy, gastroscopy, duodenoscopy, REMOVE FOREIGN BODY ;  Surgeon: Lokesh Paula MD;  [facility]     ESOPHAGOSCOPY, GASTROSCOPY, DUODENOSCOPY (EGD), COMBINED N/A 10/6/2019    Procedure: ESOPHAGOGASTRODUODENOSCOPY (EGD) with fireign body removal x2, esophageal stent placement with floroscopy;  Surgeon: Timoteo Espana MD;  [facility]     ESOPHAGOSCOPY, GASTROSCOPY, DUODENOSCOPY (EGD), COMBINED N/A 12/2/2019    Procedure: Esophagogastroduodenoscopy with esophageal stent removal, esophogram;  Surgeon: Kailee Tena MD;  [facility]: [facility]     ESOPHAGOSCOPY, GASTROSCOPY, DUODENOSCOPY (EGD), COMBINED N/A 12/13/2019    Procedure: ESOPHAGOGASTRODUODENOSCOPY, WITH FOREIGN BODY REMOVAL;  Surgeon: Samia Stanton MD;  [facility]     EXAM UNDER ANESTHESIA ANUS N/A 1/10/2017    Procedure: EXAM UNDER ANESTHESIA ANUS;  Surgeon: Annmarie Haynes MD;  [facility]     EXAM UNDER ANESTHESIA RECTUM N/A 7/19/2018    Procedure: EXAM UNDER ANESTHESIA RECTUM;  EXAM UNDER ANESTHESIA, REMOVAL OF RECTAL FOREIGN BODY;  Surgeon: Annmarie Haynes MD;  [facility]: [facility]     HC REMOVE FECAL IMPACTION OR FB W ANESTHESIA N/A 12/18/2016    Procedure: REMOVE FECAL IMPACTION/FOREIGN BODY UNDER ANESTHESIA;  Surgeon: Soham Cano MD;  Location: [facility] OR     KNEE SURGERY - removed a small tissue mass from knee Right      LAPAROSCOPIC ABLATION ENDOMETRIOSIS       LAPAROTOMY EXPLORATORY N/A 1/10/2017    Procedure: LAPAROTOMY EXPLORATORY;  Surgeon: Annmarie Haynes MD;  [facility]: [facility]     LAPAROTOMY EXPLORATORY  09/11/2019    Manual manipulation of  bowel to remove pill bottle in rectum     lymph nodes removed from neck; benign  age 7     MAMMOPLASTY REDUCTION Bilateral      RELEASE CARPAL TUNNEL Bilateral      SIGMOIDOSCOPY FLEXIBLE N/A 1/10/2017    Procedure: SIGMOIDOSCOPY FLEXIBLE;  Surgeon: Annmarie Haynes MD;  [facility]     SIGMOIDOSCOPY FLEXIBLE N/A 5/8/2018    Procedure: SIGMOIDOSCOPY FLEXIBLE;  flex sig with foreign body removal using snare and rattooth forcep;  Surgeon: Soham Cano MD;  Location:  GI     SIGMOIDOSCOPY FLEXIBLE N/A 2/20/2019    Procedure: Exam under anesthesia Colonoscopy with attempted  removal of rectal foreign body;  Surgeon: Estrada Chávez MD;  Location: [facility]       Social History   I have reviewed this patient's social history and updated it with pertinent information if needed.  Social History     Tobacco Use     Smoking status: Never Smoker     Smokeless tobacco: Never Used   Substance Use Topics     Alcohol use: No     Alcohol/week: 0.0 standard drinks     Drug use: No       Family History   I have reviewed this patient's family history and updated it with pertinent information if needed.   Family History   Problem Relation Age of Onset     Diabetes Type 2  Maternal Grandmother      Diabetes Type 2  Paternal Grandmother      Breast Cancer Paternal Grandmother      Cerebrovascular Disease Father 53     Myocardial Infarction No family hx of      Coronary Artery Disease Early Onset No family hx of      Ovarian Cancer No family hx of      Colon Cancer No family hx of        Prior to Admission Medications   Prior to Admission Medications   Prescriptions Last Dose Informant Patient Reported? Taking?   acetaminophen (TYLENOL) 32 mg/mL liquid   No No   Sig: Take 31.25 mLs (1,000 mg) by mouth every 6 hours as needed for fever or pain   albuterol (PROAIR HFA) 108 (90 Base) MCG/ACT inhaler  Self Yes No   Sig: Inhale 2 puffs into the lungs 4 times daily as needed    alum & mag hydroxide-simethicone (MYLANTA/MAALOX)  200-200-20 MG/5ML SUSP suspension   Yes No   Sig: Take 30 mLs by mouth every 6 hours as needed for indigestion   apixaban ANTICOAGULANT (ELIQUIS STARTER PACK) 5 MG tablet   No No   Sig: Take 2 tablets (10 mg) by mouth 2 times daily for 7 days, THEN 1 tablet (5 mg) 2 times daily for 23 days.   busPIRone (BUSPAR) 10 MG tablet  Self Yes No   Sig: Take 1 tablet (10 mg) by mouth twice daily   calcium carbonate (TUMS) 500 MG chewable tablet   Yes No   Sig: Take 1 chew tab by mouth 3 times daily as needed    cloNIDine (CATAPRES) 0.1 MG tablet  Self Yes No   Sig: Take 0.1 mg by mouth 2 times daily    desvenlafaxine (PRISTIQ) 100 MG 24 hr tablet  Self Yes No   Sig: Take 1 tablet (100 mg) by mouth every morning   diphenhydrAMINE (BENADRYL) 25 MG capsule  Self Yes No   Sig: Take 1-2 capsules (25-50 mg) by mouth every 6 hours as needed for itching or sleep   docosanol (ABREVA) 10 % CREA cream   Yes No   Sig: Apply to lip 5 times a day as soon as symptoms begin, do not use for more than 10 days.   gabapentin (NEURONTIN) 600 MG tablet  Self Yes No   Sig: Take 600 mg by mouth 3 times daily    levonorgestrel (MIRENA) 20 MCG/24HR IUD   Yes No   Si each by Intrauterine route once   lidocaine (XYLOCAINE) 2 % solution   No No   Sig: Swish and spit 15 mLs in mouth every 6 hours as needed (soar throat)   lurasidone (LATUDA) 60 MG TABS tablet  Self Yes No   Sig: Take 1 tablet (60 mg) by mouth at bedtime   metFORMIN (GLUCOPHAGE-XR) 500 MG 24 hr tablet  Self Yes No   Sig: Take 1 tablet (500 mg) by mouth daily   ondansetron (ZOFRAN-ODT) 8 MG ODT tab  Self No No   Sig: Take 1 tablet (8 mg) by mouth every 6 hours as needed for nausea or vomiting   oxyCODONE (ROXICODONE) 5 MG tablet   Yes No   Sig: Take 5 mg by mouth every 4 hours as needed (pain) (patient rarely uses)   simethicone (MYLICON) 80 MG chewable tablet   No No   Sig: Take 1 tablet (80 mg) by mouth every 6 hours as needed for flatulence or cramping (after meals)   sucralfate  (CARAFATE) 1 GM/10ML suspension   No No   Sig: Take 10 mLs (1 g) by mouth 4 times daily   topiramate (TOPAMAX) 100 MG tablet  Self Yes No   Sig: Take 1 tablet (100 mg) by mouth daily at bedtime   vitamin D3 (CHOLECALCIFEROL) 2000 units (50 mcg) tablet  Self Yes No   Sig: Take 1 tablet (2,000 units) by mouth daily      Facility-Administered Medications: None     Allergies   Allergies   Allergen Reactions     Amoxicillin-Pot Clavulanate Other (See Comments), Rash and Swelling     PN: facial swelling, left side. Also had cortisone injection the same day as she started the Augmentin.  PN: facial swelling, left side. Also had cortisone injection the same day as she started the Augmentin.  Noted in downtime recovery of chart.       Penicillins Anaphylaxis     Vancomycin Swelling, Itching and Rash     Other reaction(s): Redness  Flushed face, very itchy; HUT Comment: Flushed face, very itchy; HUT Reaction: Angioedema; HUT Reaction: Redness; HUT Severity: Med; HUT Noted: [id number]  Flushed face, very itchy  Flushed face, very itchy  Flushed face, very itchy       Hydrocodone Nausea and Vomiting and GI Disturbance     vomiting for days  vomiting for days  vomiting for days  vomiting for days, PN: vomiting for days  vomiting for days  vomiting for days  vomiting for days  vomiting for days  vomiting for days  vomiting for days, PN: vomiting for days  vomiting for days  vomiting for days  vomiting for days  vomiting for days  ; HUT Comment: vomiting for days; HUT Reaction: Gastrointestinal; HUT Reaction: Nausea And Vomiting; HUT Reaction Type: Intolerance; HUT Severity: Med; HUT Noted: [id number]  vomiting for days       Blood-Group Specific Substance Other (See Comments)     Patient has an anti-Cw and non-specific antibodies. Blood product orders may be delayed. Draw one red top and two purple top tubes for all type/screen/crossmatch orders.     Influenza Vaccines Other (See Comments)     Oseltamivir Hives     med stopped, PN:  med stopped     Cephalosporins Rash     Lamotrigine Rash     Possibly associated with Lamictal.   HUT Comment: Possibly associated with Lamictal. ; HUT Reaction: Rash; HUT Reaction Type: Allergy; HUT Severity: Low; HUT Noted: [id number]     Latex Rash       Physical Exam   Vital Signs: Temp: 98.1  F (36.7  C) Temp src: Oral BP: (!) 144/87 Pulse: 94 Heart Rate: 90 Resp: 11 SpO2: 97 % O2 Device: None (Room air) Oxygen Delivery: 6 LPM  Weight: 240 lbs 0 oz    General Appearance: alert and oriented, not in acute distress, appears comfortable  HEENT: throat without erythema  Respiratory; unlabored clear bilaterally  Abdomen: soft non tender non distended, pain over LUQ without tenderness  Skin: no rash  Musculoskeletal: no tenderness over anterior chest or costochondral area  Neurologic: baseline neuropathy, otherwise non focal  Psychiatric: stable mood, calm, cooperative,     Data   Data reviewed today: I reviewed all medications, new labs and imaging results over the last 24 hours. I personally reviewed the chest CT image(s) showing mildly thick walled slightly patulous esophagus without mediastinal fluid collections or air. linear metallic density in the stomach consistent with the swallowed FB..    Recent Labs   Lab 12/17/19  2250 12/13/19  2035   WBC 10.5 9.5   HGB 11.1* 11.5*   MCV 93 91    294   INR 1.15*  --     141   POTASSIUM 3.6 3.8   CHLORIDE 112* 112*   CO2 25 22   BUN 10 12   CR 0.53 0.55   ANIONGAP 5 7   KELBY 8.7 8.9   GLC 94 104*

## 2019-12-18 NOTE — CONSULTS
Psychiatry Consultation; Follow up              Reason for Consult, requesting source:    Deliberate foreign body ingestion  Requesting source: Jennifer Roblero                 Interim history:    The patient is a 29 year old who was admitted 12/17/2019 s/p/ EGD and removal of foreign body of [employer] tree ornament hooks that she had ingested. She describes that she called 911 and was first brought to [facility]. They were unable to retrieve the 2 wires. She was transferred to [facility].     She was seen by me in October after similar presentation of foreign body ingestion- at that time it was ingestion of paper clip which perforated esophagus and required stenting.   She has a long history of ingesting a number of different foreign objects, usually paper clips or safety pins for which she has had multiple EGDs and laparoscopies. She has had hospitalization at [facility] and [facility].     She has also been seen by Ambrose/ Prakash and Xiomara of this service for similar presentation.  She started seeing an in-home behavior analyst this fall and see outpatient psychiatrist Dr. Lyndsey De La Rosa every 6 weeks.     From a psychiatric perspective, she has a long history of problems with depression, borderline personality disorder and PTSD.      She denies any known activating factors to the impulsive swallowing behavior.  She was alone when she was decorating her [place] tree. She states that her sister, who lives next door, has gone in and removed the hooks from the tree.            Medications:     Current Facility-Administered Medications:      acetaminophen (TYLENOL) Suppository 650 mg, 650 mg, Rectal, Q4H PRN, Barbara Tong MD     acetaminophen (TYLENOL) tablet 650 mg, 650 mg, Oral, Q4H PRN, Barbara Tong MD     albuterol (PROAIR HFA/PROVENTIL HFA/VENTOLIN HFA) 108 (90 Base) MCG/ACT inhaler 2 puff, 2 puff, Inhalation, 4x Daily PRN, Barbara Tong MD     apixaban  ANTICOAGULANT (ELIQUIS) tablet 5 mg, 5 mg, Oral, BID, Barbara Tong MD, 5 mg at 12/18/19 0818     busPIRone (BUSPAR) tablet 10 mg, 10 mg, Oral, BID, Barbara Tong MD, 10 mg at 12/18/19 0818     cloNIDine (CATAPRES) tablet 0.1 mg, 0.1 mg, Oral, BID, Barbara Tong MD, 0.1 mg at 12/18/19 0818     desvenlafaxine (PRISTIQ) 24 hr tablet 100 mg, 100 mg, Oral, Daily, Barbara Tong MD, 100 mg at 12/18/19 0818     gabapentin (NEURONTIN) tablet 600 mg, 600 mg, Oral, TID, Barbara Tong MD, 600 mg at 12/18/19 0818     HYDROmorphone (DILAUDID) injection 1 mg, 1 mg, Intravenous, Q1H PRN, Jeffy Velasquez MD, 1 mg at 12/17/19 2232     lidocaine (XYLOCAINE) 2 % solution 15 mL, 15 mL, Swish & Spit, Q6H PRN, Barbara Tong MD     lurasidone (LATUDA) tablet 60 mg, 60 mg, Oral, Daily at 8 pm, Barbara Tong MD     melatonin tablet 1 mg, 1 mg, Oral, At Bedtime PRN, Barbara Tong MD     naloxone (NARCAN) injection 0.1-0.4 mg, 0.1-0.4 mg, Intravenous, Q2 Min PRN, Francia Quick MD     naloxone (NARCAN) injection 0.1-0.4 mg, 0.1-0.4 mg, Intravenous, Q2 Min PRN, Barbara Tong MD     naloxone (NARCAN) injection 0.1-0.4 mg, 0.1-0.4 mg, Intravenous, Q2 Min PRN, Lizzy Abdul MD     omeprazole (priLOSEC) CR capsule 40 mg, 40 mg, Oral, BID AC, Barbara Tong MD, 40 mg at 12/18/19 0818     ondansetron (ZOFRAN-ODT) ODT tab 4 mg, 4 mg, Oral, Q6H PRN **OR** ondansetron (ZOFRAN) injection 4 mg, 4 mg, Intravenous, Q6H PRN, Barbara Tong MD     oxyCODONE (ROXICODONE) tablet 5 mg, 5 mg, Oral, Q4H PRN, Lizzy Abdul MD, 5 mg at 12/18/19 0859     sucralfate (CARAFATE) suspension 1 g, 1 g, Oral, 4x Daily, Barbara Tong MD, 1 g at 12/18/19 0818     topiramate (TOPAMAX) tablet 100 mg, 100 mg, Oral, Daily at 8 pm, Barbara Tong MD           MSE:   Appearance/Attitude: Well groomed in hospital bed, she  "has numerous objections about her care and states she is upset but she cooperatives with interview  Orientation: Alert and oriented to person, place, date, day and situation  Speech: Normal rate and rhythm  Movements: No tics, tremor, rigidity or abnormal movements  Thought process: Goal directed, logical and linear.  A bit perseverative on perceived slights that people do not understand her  Thought content: Denies SI or HI, Denies thought of self harm or urge to ingest objects. No delusions, No AH or VH.  Mood: \"I am frustrated\"  Affect: Irritable but overall stable.  Insight: limited  Judgment: limited  Impulse Control: intermittent impulse ingestion of foreign body. No impulses in last 24 hours        Vital signs:  Temp: 98.1  F (36.7  C) Temp src: Oral BP: 138/81 Pulse: 61 Heart Rate: 86 Resp: 16 SpO2: 96 % O2 Device: None (Room air) Oxygen Delivery: 6 LPM Height: 157.5 cm (5' 2\") Weight: 108.9 kg (240 lb)  Estimated body mass index is 43.9 kg/m  as calculated from the following:    Height as of this encounter: 1.575 m (5' 2\").    Weight as of this encounter: 108.9 kg (240 lb).              DSM-5 Diagnosis:   Major depressive disorder, recurrent, moderate  Borderline Personality Disorder  PTSD by history          Assessment:   This is  a 28 year old with long history of impulsive deliberate ingestion of swallowing foreign body. She presents as frustrated with cares but then admits she is frustrated with herself.  She is unable to identify any antecedent to the recent event of swallowing ornament hooks though she states it happens when she is \"bored.\"  We discuss thought stopping techniques and response prevention.  She says PRN use has not helped nor has the use of Revia.  After saying \"nothing helps\" we discuss times that she has had thoughts but did not act on them as evidence that there has been success in not acting on impulse, and that giving herself a moments between thought and action may the best place " to intervene, and suggest to her that using the Atarax may be helpful. She is agreeable to this.    She denies SI or thoughts of swallowing. She does not meet criteria for inpatient psychiatric  hospitalization.           Summary of Recommendations:   1. Continue outpatient psychiatric medication  2. Continue outpatient psychiatric follow up and care coordination through [facility] with in-home services  3. Start Atarax 25 mg PO BID PRN at immediate onset of impulsive thoughts. May discharge with 7 days supply  4. Does not need 1:1 staffing. May have tray with regular utensils,
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
## 2019-12-18 NOTE — PROGRESS NOTES
DC instructions given to pt, verbalized understanding.  All belongings with pt, IV DC'd and documented. Pt was taken down to the discharge pharmacy via wheelchair.

## 2019-12-18 NOTE — PLAN OF CARE
"Observation Goals:    -diagnostic tests and consults completed and resulted: yes    -vital signs normal or at patient baseline: yes    -tolerating oral intake to maintain hydration: yes     /81 (BP Location: Right arm)   Pulse 61   Temp 98.1  F (36.7  C) (Oral)   Resp 16   Ht 1.575 m (5' 2\")   Wt 108.9 kg (240 lb)   SpO2 96%   BMI 43.90 kg/m      "

## 2019-12-18 NOTE — OR NURSING
Pt c/o being itchy d/t the narcotics and she would like benadryl for this. Dr. Marshall. Resident sent a message to ask for benadryl. Vitals stable though BP a little elevated. Pt states this may be d/t pain. Pt c/o pain in throat which is sharp and aching pain in abdomen. Pt oriented x4 and follows commands.

## 2019-12-18 NOTE — OR NURSING
Message sent to Dr. Tong - General Medicine to write a post-op note. She called back and was not able to do so. Message sent to Dr. Valdez - GI Medicine Fellow to write post-op note.

## 2019-12-18 NOTE — ANESTHESIA POSTPROCEDURE EVALUATION
Anesthesia POST Procedure Evaluation    Patient: Nevin Alvarado   MRN:     [id number] Gender:   female   Age:    29 year old :      1990        Preoperative Diagnosis: * No pre-op diagnosis entered *   Procedure(s):  ESOPHAGOGASTRODUODENOSCOPY (EGD)   Postop Comments: No value filed.       Anesthesia Type:  Not documented  No value filed.    Reportable Event: NO     PAIN: Uncomplicated   Sign Out status: Comfortable, Well controlled pain     PONV: No PONV   Sign Out status:  No Nausea or Vomiting     Neuro/Psych: Uneventful perioperative course   Sign Out Status: Preoperative baseline; Age appropriate mentation     Airway/Resp.: Uneventful perioperative course   Sign Out Status: Non labored breathing, age appropriate RR; Resp. Status within EXPECTED Parameters     CV: Uneventful perioperative course   Sign Out status: Appropriate BP and perfusion indices; Appropriate HR/Rhythm     Disposition:   Sign Out in:  PACU  Disposition:  Floor  Recovery Course: Uneventful  Follow-Up: Not required           Last Anesthesia Record Vitals:  CRNA VITALS  2019 0023 - 2019 0123      2019             EKG:  Sinus rhythm          Last PACU Vitals:  Vitals Value Taken Time   /81 2019  5:00 AM   Temp 36.7  C (98.1  F) 2019  4:23 AM   Pulse 61 2019  5:00 AM   Resp 16 2019  4:23 AM   SpO2 95 % 2019  5:18 AM   Temp src     NIBP     Pulse     SpO2     Resp     Temp     Ht Rate     Temp 2     Vitals shown include unvalidated device data.      Electronically Signed By: Wale Kaplan MD, 2019, 5:23 AM

## 2019-12-18 NOTE — OR NURSING
Dr. Quick at bedside assessing Pt and he explained that he will order another 25 mg but that is the upper limit that she can get for 6 hours. Pt OK with this.

## 2019-12-18 NOTE — OR NURSING
Dr. Quick ordered 25 mg of benadryl to be given for Pt's itching. This was given. Pt continues to c/o being itchy over head, face, upper arms, stomach, and bilateral legs. Message sent to Dr. Quick reporting this and requesting more benadryl per Pt request..

## 2019-12-18 NOTE — PROGRESS NOTES
Thoracic Surgery Progress Note    Patient seen and evaluated last night and again this morning. Repeated and escalating frequency of sharp object ingestion since 12/13 - scoped 12/13, 12/15, 12/16, 12/17, 12/18. Evaluated by psychiatry but she does not have any current impulse to ingest or cause self harm, so she does not meet inpatient criteria. We discussed at length with her the dangers of ingesting sharp objects, which she was able to verbalize. We will sign off and defer the remainder of her care to GI. From a thoracic standpoint, we will only plan on intervening if there is a kevin perforation.     Margie Razo MD  Cardiothoracic Fellow  [facility] [phone]

## 2019-12-18 NOTE — PLAN OF CARE
"Observation Goals:    -diagnostic tests and consults completed and resulted:yes    -vital signs normal or at patient baseline:yes     -tolerating oral intake to maintain hydration:yes    /74 (BP Location: Left arm)   Pulse 96   Temp 98.1  F (36.7  C) (Oral)   Resp 16   Ht 1.575 m (5' 2\")   Wt 108.9 kg (240 lb)   SpO2 95%   BMI 43.90 kg/m      "

## 2019-12-18 NOTE — ANESTHESIA PREPROCEDURE EVALUATION
Anesthesia Pre-Procedure Evaluation    Patient: Nevin Alvarado   MRN:     [id number] Gender:   female   Age:    29 year old :      1990        Preoperative Diagnosis: * No pre-op diagnosis entered *   Procedure(s):  ESOPHAGOGASTRODUODENOSCOPY (EGD)     Past Medical History:   Diagnosis Date     ADD (attention deficit disorder)      Anorexia nervosa with bulimia     history of; on Topamax     Anxiety      Borderline personality disorder (H)      Depression      Depressive disorder      H/O self-harm      Lives in independent group home     due to debilitating mental illness     Migraine without aura     no known triggers; on Topamax bid and Imitrex PRN     Morbid obesity (H)      PTSD (post-traumatic stress disorder)      Rectal foreign body - Recurrent issue, self placed      Swallowed foreign body - Recurrent issue, self placed       Past Surgical History:   Procedure Laterality Date     COMBINED ESOPHAGOSCOPY, GASTROSCOPY, DUODENOSCOPY (EGD), REPLACE ESOPHAGEAL STENT N/A 10/9/2019    Procedure: Upper Endoscopy with Suture Placement;  Surgeon: Zurdo Ramirez MD;  Location: [facility]     ESOPHAGOSCOPY, GASTROSCOPY, DUODENOSCOPY (EGD), COMBINED N/A 3/9/2017    Procedure: COMBINED ESOPHAGOSCOPY, GASTROSCOPY, DUODENOSCOPY (EGD), REMOVE FOREIGN BODY;  Surgeon: Avis Guzmán MD;  Location: [facility]     ESOPHAGOSCOPY, GASTROSCOPY, DUODENOSCOPY (EGD), COMBINED N/A 2017    Procedure: COMBINED ESOPHAGOSCOPY, GASTROSCOPY, DUODENOSCOPY (EGD), REMOVE FOREIGN BODY;  EGD removal Foregin body;  Surgeon: Lokesh Paula MD;  Location: [facility]     ESOPHAGOSCOPY, GASTROSCOPY, DUODENOSCOPY (EGD), COMBINED N/A 2017    Procedure: COMBINED ESOPHAGOSCOPY, GASTROSCOPY, DUODENOSCOPY (EGD);  COMBINED ESOPHAGOSCOPY, GASTROSCOPY, DUODENOSCOPY (EGD) [[id number]]attempted removal of foreign body;  Surgeon: Pamela Perez MD;  [facility]     ESOPHAGOSCOPY, GASTROSCOPY, DUODENOSCOPY  (EGD), COMBINED N/A 6/9/2017    Procedure: COMBINED ESOPHAGOSCOPY, GASTROSCOPY, DUODENOSCOPY (EGD), REMOVE FOREIGN BODY;  Esophagoscopy, Gastroscopy, Duodenoscopy, Removal of Foreign Body;  Surgeon: Dejon Marsh MD;  Location: [facility]     ESOPHAGOSCOPY, GASTROSCOPY, DUODENOSCOPY (EGD), COMBINED N/A 1/6/2018    Procedure: COMBINED ESOPHAGOSCOPY, GASTROSCOPY, DUODENOSCOPY (EGD), REMOVE FOREIGN BODY;  COMBINED ESOPHAGOSCOPY, GASTROSCOPY, DUODENOSCOPY (EGD) [by pascal net and snare with profol sedation;  Surgeon: Feliciano Emmanuel MD;  Location:  GI     ESOPHAGOSCOPY, GASTROSCOPY, DUODENOSCOPY (EGD), COMBINED N/A 3/19/2018    Procedure: COMBINED ESOPHAGOSCOPY, GASTROSCOPY, DUODENOSCOPY (EGD), REMOVE FOREIGN BODY;   Esophagodscopy, Gastroscopy, Duodenoscopy,Foreign Body Removal;  Surgeon: Brice Guzmán MD;  [facility]: [facility]     ESOPHAGOSCOPY, GASTROSCOPY, DUODENOSCOPY (EGD), COMBINED N/A 4/16/2018    Procedure: COMBINED ESOPHAGOSCOPY, GASTROSCOPY, DUODENOSCOPY (EGD), REMOVE FOREIGN BODY;  Esophagogastroduodenoscopy  Foreign Body Removal X 2;  Surgeon: Royer Moise MD;  [facility]     ESOPHAGOSCOPY, GASTROSCOPY, DUODENOSCOPY (EGD), COMBINED N/A 6/1/2018    Procedure: COMBINED ESOPHAGOSCOPY, GASTROSCOPY, DUODENOSCOPY (EGD), REMOVE FOREIGN BODY;  COMBINED ESOPHAGOSCOPY, GASTROSCOPY, DUODENOSCOPY with removal of foreign body, propofol sedation by anesthesia;  Surgeon: Brice Martinez MD;  Location:  GI     ESOPHAGOSCOPY, GASTROSCOPY, DUODENOSCOPY (EGD), COMBINED N/A 7/25/2018    Procedure: COMBINED ESOPHAGOSCOPY, GASTROSCOPY, DUODENOSCOPY (EGD), REMOVE FOREIGN BODY;;  Surgeon: Candy Castelan MD;  Location:  GI     ESOPHAGOSCOPY, GASTROSCOPY, DUODENOSCOPY (EGD), COMBINED N/A 7/28/2018    Procedure: COMBINED ESOPHAGOSCOPY, GASTROSCOPY, DUODENOSCOPY (EGD), REMOVE FOREIGN BODY;  COMBINED ESOPHAGOSCOPY, GASTROSCOPY, DUODENOSCOPY (EGD), REMOVE FOREIGN BODY;  Surgeon: Ramirez  MD Brice;  [facility]: [facility]     ESOPHAGOSCOPY, GASTROSCOPY, DUODENOSCOPY (EGD), COMBINED N/A 7/31/2018    Procedure: COMBINED ESOPHAGOSCOPY, GASTROSCOPY, DUODENOSCOPY (EGD);  COMBINED ESOPHAGOSCOPY, GASTROSCOPY, DUODENOSCOPY (EGD) TO REMOVE FOREIGN BODY;  Surgeon: Lokesh Paula MD;  [facility]: [facility]     ESOPHAGOSCOPY, GASTROSCOPY, DUODENOSCOPY (EGD), COMBINED N/A 8/4/2018    Procedure: COMBINED ESOPHAGOSCOPY, GASTROSCOPY, DUODENOSCOPY (EGD), REMOVE FOREIGN BODY;   combined esophagoscopy, gastroscopy, duodenoscopy, REMOVE FOREIGN BODY ;  Surgeon: Lokesh Paula MD;  [facility]     ESOPHAGOSCOPY, GASTROSCOPY, DUODENOSCOPY (EGD), COMBINED N/A 10/6/2019    Procedure: ESOPHAGOGASTRODUODENOSCOPY (EGD) with fireign body removal x2, esophageal stent placement with floroscopy;  Surgeon: Timoteo Espana MD;  [facility]     ESOPHAGOSCOPY, GASTROSCOPY, DUODENOSCOPY (EGD), COMBINED N/A 12/2/2019    Procedure: Esophagogastroduodenoscopy with esophageal stent removal, esophogram;  Surgeon: Kailee Tena MD;  [facility]     ESOPHAGOSCOPY, GASTROSCOPY, DUODENOSCOPY (EGD), COMBINED N/A 12/13/2019    Procedure: ESOPHAGOGASTRODUODENOSCOPY, WITH FOREIGN BODY REMOVAL;  Surgeon: Samai Stanton MD;  [facility]     EXAM UNDER ANESTHESIA ANUS N/A 1/10/2017    Procedure: EXAM UNDER ANESTHESIA ANUS;  Surgeon: Annmarie Haynes MD;  [facility]     EXAM UNDER ANESTHESIA RECTUM N/A 7/19/2018    Procedure: EXAM UNDER ANESTHESIA RECTUM;  EXAM UNDER ANESTHESIA, REMOVAL OF RECTAL FOREIGN BODY;  Surgeon: Annmarie Haynes MD;  [facility]: [facility]     HC REMOVE FECAL IMPACTION OR FB W ANESTHESIA N/A 12/18/2016    Procedure: REMOVE FECAL IMPACTION/FOREIGN BODY UNDER ANESTHESIA;  Surgeon: Soham Cano MD;  Location: [facility]     KNEE SURGERY - removed a small tissue mass from knee Right      LAPAROSCOPIC ABLATION ENDOMETRIOSIS       LAPAROTOMY EXPLORATORY N/A 1/10/2017    Procedure:  LAPAROTOMY EXPLORATORY;  Surgeon: Annmarie Haynes MD;  [facility]: [facility]     LAPAROTOMY EXPLORATORY  09/11/2019    Manual manipulation of bowel to remove pill bottle in rectum     lymph nodes removed from neck; benign  age 7     MAMMOPLASTY REDUCTION Bilateral      RELEASE CARPAL TUNNEL Bilateral      SIGMOIDOSCOPY FLEXIBLE N/A 1/10/2017    Procedure: SIGMOIDOSCOPY FLEXIBLE;  Surgeon: Annmarie Haynes MD;  [facility]: [facility]     SIGMOIDOSCOPY FLEXIBLE N/A 5/8/2018    Procedure: SIGMOIDOSCOPY FLEXIBLE;  flex sig with foreign body removal using snare and rattooth forcep;  Surgeon: Soham Cano MD;  Location:  GI     SIGMOIDOSCOPY FLEXIBLE N/A 2/20/2019    Procedure: Exam under anesthesia Colonoscopy with attempted  removal of rectal foreign body;  Surgeon: Estrada Chávez MD;  Location: [facility]          Anesthesia Evaluation     . Pt has had prior anesthetic. Type: General and MAC    No history of anesthetic complications          ROS/MED HX    ENT/Pulmonary:  - neg pulmonary ROS   (+)ZOHRA risk factors obese, , . .    Neurologic:  - neg neurologic ROS     Cardiovascular:     (+) ----. : . . fainting (syncope). :. .       METS/Exercise Tolerance:  >4 METS   Hematologic: Comments: Anticoagulation held >12h - neg hematologic  ROS   (+) History of blood clots pt is anticoagulated, -      Musculoskeletal:  - neg musculoskeletal ROS       GI/Hepatic: Comment: S/p multiple foreign body ingestions and rectal insertions resulting in esophogeal perforation, stenting 10/9/2019, stricture, pain  Stent removed 12/03  Now with new esophageal perf from attempted EGD removal of samuel garcia hook        Renal/Genitourinary:  - ROS Renal section negative       Endo:  - neg endo ROS   (+) Obesity, .      Psychiatric: Comment: PTSD, Intermittent suicidal ideation      (+) psychiatric history anxiety, depression and bipolar      Infectious Disease:  - neg infectious disease ROS       Malignancy:      - no  malignancy   Other:    (+) No chance of pregnancy C-spine cleared: N/A, no H/O Chronic Pain,no other significant disability   - neg other ROS                     PHYSICAL EXAM:   Mental Status/Neuro: A/A/O   Airway:   Mallampati: III  Mouth/Opening: Full  TM distance: > 6 cm  Neck ROM: Full   Respiratory: Auscultation: CTAB     Resp. Rate: Normal     Resp. Effort: Normal      CV: Rhythm: Regular  Rate: Age appropriate  Heart: Normal Sounds  Edema: None   Comments:      Dental: Normal Dentition                LABS:  CBC:   Lab Results   Component Value Date    WBC 10.5 12/17/2019    WBC 9.5 12/13/2019    HGB 11.1 (L) 12/17/2019    HGB 11.5 (L) 12/13/2019    HCT 35.6 12/17/2019    HCT 36.5 12/13/2019     12/17/2019     12/13/2019     BMP:   Lab Results   Component Value Date     12/17/2019     12/13/2019    POTASSIUM 3.6 12/17/2019    POTASSIUM 3.8 12/13/2019    CHLORIDE 112 (H) 12/17/2019    CHLORIDE 112 (H) 12/13/2019    CO2 25 12/17/2019    CO2 22 12/13/2019    BUN 10 12/17/2019    BUN 12 12/13/2019    CR 0.53 12/17/2019    CR 0.55 12/13/2019    GLC 94 12/17/2019     (H) 12/13/2019     COAGS:   Lab Results   Component Value Date    PTT 40 (H) 12/17/2019    INR 1.15 (H) 12/17/2019     POC:   Lab Results   Component Value Date    BGM 99 11/03/2019    HCG Negative 12/02/2019    HCGS Negative 11/28/2019     OTHER:   Lab Results   Component Value Date    LACT 1.0 11/28/2019    KELBY 8.7 12/17/2019    PHOS 3.5 12/01/2019    MAG 1.9 12/05/2019    ALBUMIN 3.1 (L) 12/09/2019    PROTTOTAL 6.2 (L) 12/09/2019    ALT 24 12/09/2019    AST 12 12/09/2019    ALKPHOS 73 12/09/2019    BILITOTAL 0.2 12/09/2019    LIPASE 40 (L) 12/04/2019    TSH 0.66 03/21/2018    T4 0.96 05/16/2017    CRP 6.6 12/09/2019    SED 26 (H) 07/11/2017        Preop Vitals    BP Readings from Last 3 Encounters:   12/17/19 120/81   12/14/19 (!) 152/66   12/09/19 121/78    Pulse Readings from Last 3 Encounters:   12/17/19 97  "  12/14/19 89   12/09/19 90      Resp Readings from Last 3 Encounters:   12/17/19 18   12/14/19 18   12/09/19 16    SpO2 Readings from Last 3 Encounters:   12/17/19 95%   12/14/19 96%   12/09/19 100%      Temp Readings from Last 1 Encounters:   12/17/19 37  C (98.6  F) (Oral)    Ht Readings from Last 1 Encounters:   12/17/19 1.575 m (5' 2\")      Wt Readings from Last 1 Encounters:   12/17/19 108.9 kg (240 lb)    Estimated body mass index is 43.9 kg/m  as calculated from the following:    Height as of this encounter: 1.575 m (5' 2\").    Weight as of this encounter: 108.9 kg (240 lb).     LDA:  Peripheral IV 12/17/19 (Active)   Site Assessment WDL 12/17/2019  9:43 PM   Line Status Saline locked 12/17/2019  9:43 PM   Phlebitis Scale 0-->no symptoms 12/17/2019  9:43 PM   Infiltration Scale 0 12/17/2019  9:43 PM   Number of days: 1       Port A Cath Single 06/26/19 Right Chest wall (Active)   Number of days: 175        Assessment:   ASA SCORE: 3    H&P: History and physical reviewed and following examination; no interval change.   Smoking Status:  Non-Smoker/Unknown   NPO Status: ELEVATED Aspiration Risk/Unknown     Plan:   Anes. Type:  General   Pre-Medication: Midazolam   Induction:  IV (RSI)   Airway: ETT; Oral   Access/Monitoring: PIV   Maintenance: Balanced     Postop Plan:   Postop Pain: Opioids  Postop Sedation/Airway: Not planned  Disposition: Inpatient/Admit     PONV Management:   Adult Risk Factors: Female, Non-Smoker, Postop Opioids   Prevention: Ondansetron, Dexamethasone     CONSENT: Deferred (Emergency)   Plan and risks discussed with: Not Applicable   Blood Products: N/a                   Wale Kaplan MD  Staff Anesthesiologist  [phone]    "

## 2019-12-19 ENCOUNTER — TELEPHONE (OUTPATIENT)
Dept: INTERNAL MEDICINE | Facility: CLINIC | Age: 29
End: 2019-12-19

## 2019-12-19 LAB — UPPER GI ENDOSCOPY: NORMAL

## 2019-12-19 NOTE — TELEPHONE ENCOUNTER
Patient followed by [facility] primary care.  Care Coordination will make no patient outreaches at this time.    Ankit Babin RN  Clinic Care Coordinator  [facility]  Ph: [phone]

## 2019-12-19 NOTE — DISCHARGE SUMMARY
[facility], [place]  Hospitalist Discharge Summary       Date of Admission:  12/17/2019  Date of Discharge:  12/18/2019  1:51 PM  Discharging Provider: Jennifer Roblero MD  Discharge Team: Hospitalist Service, Gold 9    Discharge Diagnoses   Foreign body ingestion  History of recurrent foreign body ingestion secondary to impulsiveness  Status post EGD and removal of the foreign body  History of laparotomy to remove foreign body from colon/rectum in 9/11   H/o Esophageal stent placement for esophageal perforation, stent was removed on 12/0 3    Follow-ups Needed After Discharge   Follow-up Appointments     Adult [facility] Follow-up and recommended labs and tests      Follow up with primary care provider, Latonya Knight, within 7 days for   hospital follow- up.  No follow up labs or test are needed.      Appointments on [place] and/or [facility] (with [facility] or [facility]   provider or service). Call [phone] if you haven't heard regarding   these appointments within 7 days of discharge.         Follow Up and recommended labs and tests      Follow up with psychiatry as scheduled             Unresulted Labs Ordered in the Past 30 Days of this Admission     No orders found for last 31 day(s).          Discharge Disposition   Discharged to home  Condition at discharge: Stable    Hospital Course   29-year-old female with history of complex psychosocial history depression and multiple impulsive foreign body ingestion, was admitted on December 17, 2019 after another episode of foreign body ingestion and had EGD and removal.    Recurrent impulsive foreign body ingestion: has hx of multiple foreign body ingestions/insertions with recent esophageal stent placement 10/9/2019 (s/p removal) for esophageal perforation. This time, ingested xmas ornament hooks and presented OSH. She was transferred due to concern for perforation. Underwent EGD w GI and vascular standby. Per report, no perforation  and FB were removed successfully. Patient denied this as suicidal attempt, but rather unable to control the urge to ingest.   - s/p EGD w removal:   Patient able to tolerate diet after the procedure.      Was seen by psychiatry due to recurrent history of ingestion.  Who recommended outpatient psychiatric medication and follow-up care with care coordinator for in-home services; also recommended to start patient on Atarax 25 mg twice daily as needed at the onset of impulsive thought which she was discharged with 14-day supply,  cleared patient from psychiatric perspective.  And also mentioned that she does not need one-to-one staffing anymore and can have food with regular utensils  - resume po meds  - sucralfate QID  - add protonix twice daily for at least 1 month         Recent provoked R interlobar/lower lobe PE on eliquis: Diagnosed in late Nov 2019 when she presented w hemoptysis. attributed to recent surgery and hospitalizations leading to prolonged immobilization. No DVT found. Plan to continue for 3 months per note (through the end of Feb 2020)  - ok to resume eliquis in discharge     Complex psychocosocial history  Depression  anxiety  hx of PTSD  Denied her mood being worse. Denied recent change in her meds. Takes multiple psych meds, and followed by outside psychiatrist. She lives in an apartment by herself, but her sister lives in the next room.  - bedside attendant and psych consult as above  - continue home psych meds: buspar, clonidine, neurontin, latuda, topiramate  - per patient, she has appointment to start home therapy in early Jan. Has appt with care coodinator etc on 12/19.  -Patient was discharged on Atarax as recommended by psychiatry with a supply of 2 weeks  -Psychiatric cleared patient to go home with plan to follow-up as an outpatient    Consultations This Hospital Stay   PSYCHIATRY IP CONSULT    Code Status   Full Code    Time Spent on this Encounter   I, Jennifer Roblero MD, personally  saw the patient today and spent greater than 30 minutes discharging this patient.       Jennifer Roblero MD  [facility] [place]  ______________________________________________________________________    Physical Exam   Vital Signs: Temp: 98.1  F (36.7  C) Temp src: Oral BP: 108/74 Pulse: 96 Heart Rate: 86 Resp: 16 SpO2: 95 % O2 Device: None (Room air) Oxygen Delivery: 6 LPM  Weight: 240 lbs 0 oz  General Appearance: Patient appears comfortable on examination  Respiratory: Bilateral equal breath sounds with no added sound  Cardiovascular: S1-S2 heard with no murmur  GI: Soft, nontender, nondistended, bowel sounds noted  Skin: No rash  Other: Awake alert and oriented to time place and person; nonfocal exam       Primary Care Physician   Latonya Knight    Discharge Orders      Reason for your hospital stay    Concern for foreign body ingestion ; please see your primary care in 1 week     Adult [facility] Follow-up and recommended labs and tests    Follow up with primary care provider, Latonya Knight, within 7 days for hospital follow- up.  No follow up labs or test are needed.      Appointments on [place] and/or [facility] (with [facility] or [facility] provider or service). Call [phone] if you haven't heard regarding these appointments within 7 days of discharge.     Follow Up and recommended labs and tests    Follow up with psychiatry as scheduled     Activity    Your activity upon discharge: activity as tolerated     Discharge Instructions    Please follow with primary care in 1 week   Please follow with psychiatrist as scheduled     Full Code     Diet    Follow this diet upon discharge: Orders Placed This Encounter      Advance Diet as Tolerated: Regular Diet Adult       Significant Results and Procedures   Most Recent 3 BMP's:  Recent Labs   Lab Test 12/17/19  2250 12/13/19  2035 12/09/19  0001    141 142   POTASSIUM 3.6 3.8 3.1*   CHLORIDE 112* 112* 113*   CO2 25 22 23   BUN  10 12 9   CR 0.53 0.55 0.51*   ANIONGAP 5 7 6   KELBY 8.7 8.9 8.3*   GLC 94 104* 104*       Discharge Medications   Discharge Medication List as of 12/18/2019  1:25 PM      START taking these medications    Details   hydrOXYzine (ATARAX) 25 MG tablet Take 1 tablet (25 mg) by mouth 3 times daily as needed for anxiety, Disp-42 tablet, R-0, E-Prescribe      pantoprazole (PROTONIX) 40 MG EC tablet Take 1 tablet (40 mg) by mouth 2 times daily, Disp-62 tablet, R-0, E-Prescribe         CONTINUE these medications which have NOT CHANGED    Details   acetaminophen (TYLENOL) 32 mg/mL liquid Take 31.25 mLs (1,000 mg) by mouth every 6 hours as needed for fever or pain, Disp-355 mL, R-0, E-Prescribe      albuterol (PROAIR HFA) 108 (90 Base) MCG/ACT inhaler Inhale 2 puffs into the lungs 4 times daily as needed , Historical      alum & mag hydroxide-simethicone (MYLANTA/MAALOX) 200-200-20 MG/5ML SUSP suspension Take 30 mLs by mouth every 6 hours as needed for indigestion, Historical      apixaban ANTICOAGULANT (ELIQUIS STARTER PACK) 5 MG tablet Take 2 tablets (10 mg) by mouth 2 times daily for 7 days, THEN 1 tablet (5 mg) 2 times daily for 23 days., Disp-74 tablet, R-0, E-Prescribe      busPIRone (BUSPAR) 10 MG tablet Take 1 tablet (10 mg) by mouth twice daily, Historical      calcium carbonate (TUMS) 500 MG chewable tablet Take 1 chew tab by mouth 3 times daily as needed , Historical      cloNIDine (CATAPRES) 0.1 MG tablet Take 0.1 mg by mouth 2 times daily , Historical      desvenlafaxine (PRISTIQ) 100 MG 24 hr tablet Take 1 tablet (100 mg) by mouth every morning, Historical      diphenhydrAMINE (BENADRYL) 25 MG capsule Take 1-2 capsules (25-50 mg) by mouth every 6 hours as needed for itching or sleep, Historical      docosanol (ABREVA) 10 % CREA cream Apply to lip 5 times a day as soon as symptoms begin, do not use for more than 10 days.Historical      gabapentin (NEURONTIN) 600 MG tablet Take 600 mg by mouth 3 times daily ,  Historical      levonorgestrel (MIRENA) 20 MCG/24HR IUD 1 each by Intrauterine route onceHistorical      lidocaine (XYLOCAINE) 2 % solution Swish and spit 15 mLs in mouth every 6 hours as needed (soar throat), Disp-100 mL, R-0, E-Prescribe      lurasidone (LATUDA) 60 MG TABS tablet Take 1 tablet (60 mg) by mouth at bedtime, Historical      metFORMIN (GLUCOPHAGE-XR) 500 MG 24 hr tablet Take 1 tablet (500 mg) by mouth daily, Historical      ondansetron (ZOFRAN-ODT) 8 MG ODT tab Take 1 tablet (8 mg) by mouth every 6 hours as needed for nausea or vomiting, Disp-20 tablet, R-1, E-Prescribe      oxyCODONE (ROXICODONE) 5 MG tablet Take 5 mg by mouth every 4 hours as needed (pain) (patient rarely uses), Historical      simethicone (MYLICON) 80 MG chewable tablet Take 1 tablet (80 mg) by mouth every 6 hours as needed for flatulence or cramping (after meals), Disp-30 tablet, R-0, E-Prescribe      sucralfate (CARAFATE) 1 GM/10ML suspension Take 10 mLs (1 g) by mouth 4 times daily, Disp-420 mL, R-1, E-Prescribe      topiramate (TOPAMAX) 100 MG tablet Take 1 tablet (100 mg) by mouth daily at bedtime, Historical      vitamin D3 (CHOLECALCIFEROL) 2000 units (50 mcg) tablet Take 1 tablet (2,000 units) by mouth daily, Historical           Allergies   Allergies   Allergen Reactions     Amoxicillin-Pot Clavulanate Other (See Comments), Rash and Swelling     PN: facial swelling, left side. Also had cortisone injection the same day as she started the Augmentin.  PN: facial swelling, left side. Also had cortisone injection the same day as she started the Augmentin.  Noted in downtime recovery of chart.       Penicillins Anaphylaxis     Vancomycin Swelling, Itching and Rash     Other reaction(s): Redness  Flushed face, very itchy; HUT Comment: Flushed face, very itchy; HUT Reaction: Angioedema; HUT Reaction: Redness; HUT Severity: Med; HUT Noted: [id number]  Flushed face, very itchy  Flushed face, very itchy  Flushed face, very itchy        Hydrocodone Nausea and Vomiting and GI Disturbance     vomiting for days  vomiting for days  vomiting for days  vomiting for days, PN: vomiting for days  vomiting for days  vomiting for days  vomiting for days  vomiting for days  vomiting for days  vomiting for days, PN: vomiting for days  vomiting for days  vomiting for days  vomiting for days  vomiting for days  ; HUT Comment: vomiting for days; HUT Reaction: Gastrointestinal; HUT Reaction: Nausea And Vomiting; HUT Reaction Type: Intolerance; HUT Severity: Med; HUT Noted: [id number]  vomiting for days       Blood-Group Specific Substance Other (See Comments)     Patient has an anti-Cw and non-specific antibodies. Blood product orders may be delayed. Draw one red top and two purple top tubes for all type/screen/crossmatch orders.     Influenza Vaccines Other (See Comments)     Oseltamivir Hives     med stopped, PN: med stopped     Cephalosporins Rash     Lamotrigine Rash     Possibly associated with Lamictal.   HUT Comment: Possibly associated with Lamictal. ; HUT Reaction: Rash; HUT Reaction Type: Allergy; HUT Severity: Low; HUT Noted: [id number]     Latex Rash

## 2019-12-21 ENCOUNTER — ANESTHESIA - HEALTHEAST (OUTPATIENT)
Dept: SURGERY | Facility: CLINIC | Age: 29
End: 2019-12-21

## 2019-12-21 ENCOUNTER — SURGERY - HEALTHEAST (OUTPATIENT)
Dept: SURGERY | Facility: CLINIC | Age: 29
End: 2019-12-21

## 2019-12-28 ENCOUNTER — APPOINTMENT (OUTPATIENT)
Dept: GENERAL RADIOLOGY | Facility: CLINIC | Age: 29
End: 2019-12-28
Attending: EMERGENCY MEDICINE
Payer: COMMERCIAL

## 2019-12-28 ENCOUNTER — ANESTHESIA EVENT (OUTPATIENT)
Dept: SURGERY | Facility: CLINIC | Age: 29
End: 2019-12-28
Payer: COMMERCIAL

## 2019-12-28 ENCOUNTER — HOSPITAL ENCOUNTER (EMERGENCY)
Facility: CLINIC | Age: 29
Discharge: HOME | End: 2019-12-28
Attending: EMERGENCY MEDICINE | Admitting: INTERNAL MEDICINE
Payer: COMMERCIAL

## 2019-12-28 ENCOUNTER — ANESTHESIA (OUTPATIENT)
Dept: SURGERY | Facility: CLINIC | Age: 29
End: 2019-12-28
Payer: COMMERCIAL

## 2019-12-28 VITALS
HEIGHT: 62 IN | DIASTOLIC BLOOD PRESSURE: 90 MMHG | TEMPERATURE: 98.3 F | HEART RATE: 96 BPM | WEIGHT: 240 LBS | RESPIRATION RATE: 16 BRPM | SYSTOLIC BLOOD PRESSURE: 128 MMHG | OXYGEN SATURATION: 99 % | BODY MASS INDEX: 44.16 KG/M2

## 2019-12-28 DIAGNOSIS — T18.9XXA SWALLOWED FOREIGN BODY, INITIAL ENCOUNTER: ICD-10-CM

## 2019-12-28 LAB
ANION GAP SERPL CALCULATED.3IONS-SCNC: 4 MMOL/L (ref 3–14)
APTT PPP: 51 SEC (ref 22–37)
BASOPHILS # BLD AUTO: 0 10E9/L (ref 0–0.2)
BASOPHILS NFR BLD AUTO: 0.4 %
BUN SERPL-MCNC: 6 MG/DL (ref 7–30)
CALCIUM SERPL-MCNC: 8.5 MG/DL (ref 8.5–10.1)
CHLORIDE SERPL-SCNC: 114 MMOL/L (ref 94–109)
CO2 SERPL-SCNC: 24 MMOL/L (ref 20–32)
CREAT SERPL-MCNC: 0.48 MG/DL (ref 0.52–1.04)
DIFFERENTIAL METHOD BLD: ABNORMAL
EOSINOPHIL # BLD AUTO: 0.2 10E9/L (ref 0–0.7)
EOSINOPHIL NFR BLD AUTO: 1.7 %
ERYTHROCYTE [DISTWIDTH] IN BLOOD BY AUTOMATED COUNT: 14.3 % (ref 10–15)
GFR SERPL CREATININE-BSD FRML MDRD: >90 ML/MIN/{1.73_M2}
GLUCOSE SERPL-MCNC: 109 MG/DL (ref 70–99)
HCT VFR BLD AUTO: 36.4 % (ref 35–47)
HGB BLD-MCNC: 11.6 G/DL (ref 11.7–15.7)
IMM GRANULOCYTES # BLD: 0 10E9/L (ref 0–0.4)
IMM GRANULOCYTES NFR BLD: 0.4 %
INR PPP: 1.42 (ref 0.86–1.14)
LYMPHOCYTES # BLD AUTO: 1.7 10E9/L (ref 0.8–5.3)
LYMPHOCYTES NFR BLD AUTO: 17.1 %
MCH RBC QN AUTO: 29.1 PG (ref 26.5–33)
MCHC RBC AUTO-ENTMCNC: 31.9 G/DL (ref 31.5–36.5)
MCV RBC AUTO: 92 FL (ref 78–100)
MONOCYTES # BLD AUTO: 0.6 10E9/L (ref 0–1.3)
MONOCYTES NFR BLD AUTO: 6.4 %
NEUTROPHILS # BLD AUTO: 7.4 10E9/L (ref 1.6–8.3)
NEUTROPHILS NFR BLD AUTO: 74 %
NRBC # BLD AUTO: 0 10*3/UL
NRBC BLD AUTO-RTO: 0 /100
PLATELET # BLD AUTO: 257 10E9/L (ref 150–450)
POTASSIUM SERPL-SCNC: 3.6 MMOL/L (ref 3.4–5.3)
RBC # BLD AUTO: 3.98 10E12/L (ref 3.8–5.2)
SODIUM SERPL-SCNC: 142 MMOL/L (ref 133–144)
UPPER GI ENDOSCOPY: NORMAL
WBC # BLD AUTO: 10 10E9/L (ref 4–11)

## 2019-12-28 PROCEDURE — 25800030 ZZH RX IP 258 OP 636: Performed by: STUDENT IN AN ORGANIZED HEALTH CARE EDUCATION/TRAINING PROGRAM

## 2019-12-28 PROCEDURE — 74019 RADEX ABDOMEN 2 VIEWS: CPT

## 2019-12-28 PROCEDURE — 25000128 H RX IP 250 OP 636: Performed by: ANESTHESIOLOGY

## 2019-12-28 PROCEDURE — 96374 THER/PROPH/DIAG INJ IV PUSH: CPT | Mod: 59

## 2019-12-28 PROCEDURE — 85610 PROTHROMBIN TIME: CPT | Performed by: EMERGENCY MEDICINE

## 2019-12-28 PROCEDURE — 25800030 ZZH RX IP 258 OP 636: Performed by: EMERGENCY MEDICINE

## 2019-12-28 PROCEDURE — 71046 X-RAY EXAM CHEST 2 VIEWS: CPT

## 2019-12-28 PROCEDURE — 25000132 ZZH RX MED GY IP 250 OP 250 PS 637: Performed by: ANESTHESIOLOGY

## 2019-12-28 PROCEDURE — 80048 BASIC METABOLIC PNL TOTAL CA: CPT | Performed by: EMERGENCY MEDICINE

## 2019-12-28 PROCEDURE — 85730 THROMBOPLASTIN TIME PARTIAL: CPT | Performed by: EMERGENCY MEDICINE

## 2019-12-28 PROCEDURE — 25000128 H RX IP 250 OP 636: Performed by: STUDENT IN AN ORGANIZED HEALTH CARE EDUCATION/TRAINING PROGRAM

## 2019-12-28 PROCEDURE — 36000055 ZZH SURGERY LEVEL 2 W FLUORO 1ST 30 MIN - UMMC: Performed by: INTERNAL MEDICINE

## 2019-12-28 PROCEDURE — 71000015 ZZH RECOVERY PHASE 1 LEVEL 2 EA ADDTL HR: Performed by: INTERNAL MEDICINE

## 2019-12-28 PROCEDURE — 25000566 ZZH SEVOFLURANE, EA 15 MIN: Performed by: INTERNAL MEDICINE

## 2019-12-28 PROCEDURE — 85025 COMPLETE CBC W/AUTO DIFF WBC: CPT | Performed by: EMERGENCY MEDICINE

## 2019-12-28 PROCEDURE — 99285 EMERGENCY DEPT VISIT HI MDM: CPT | Mod: 25

## 2019-12-28 PROCEDURE — 71000014 ZZH RECOVERY PHASE 1 LEVEL 2 FIRST HR: Performed by: INTERNAL MEDICINE

## 2019-12-28 PROCEDURE — 27210794 ZZH OR GENERAL SUPPLY STERILE: Performed by: INTERNAL MEDICINE

## 2019-12-28 PROCEDURE — 25000125 ZZHC RX 250: Performed by: STUDENT IN AN ORGANIZED HEALTH CARE EDUCATION/TRAINING PROGRAM

## 2019-12-28 PROCEDURE — 37000009 ZZH ANESTHESIA TECHNICAL FEE, EACH ADDTL 15 MIN: Performed by: INTERNAL MEDICINE

## 2019-12-28 PROCEDURE — 36000053 ZZH SURGERY LEVEL 2 EA 15 ADDTL MIN - UMMC: Performed by: INTERNAL MEDICINE

## 2019-12-28 PROCEDURE — 25000128 H RX IP 250 OP 636: Performed by: EMERGENCY MEDICINE

## 2019-12-28 PROCEDURE — 37000008 ZZH ANESTHESIA TECHNICAL FEE, 1ST 30 MIN: Performed by: INTERNAL MEDICINE

## 2019-12-28 RX ORDER — FENTANYL CITRATE 50 UG/ML
25-50 INJECTION, SOLUTION INTRAMUSCULAR; INTRAVENOUS
Status: DISCONTINUED | OUTPATIENT
Start: 2019-12-28 | End: 2019-12-28 | Stop reason: HOSPADM

## 2019-12-28 RX ORDER — NALOXONE HYDROCHLORIDE 0.4 MG/ML
.1-.4 INJECTION, SOLUTION INTRAMUSCULAR; INTRAVENOUS; SUBCUTANEOUS
Status: DISCONTINUED | OUTPATIENT
Start: 2019-12-28 | End: 2019-12-29 | Stop reason: HOSPADM

## 2019-12-28 RX ORDER — OXYCODONE HCL 5 MG/5 ML
5 SOLUTION, ORAL ORAL EVERY 4 HOURS PRN
Status: DISCONTINUED | OUTPATIENT
Start: 2019-12-28 | End: 2019-12-29 | Stop reason: HOSPADM

## 2019-12-28 RX ORDER — SODIUM CHLORIDE, SODIUM LACTATE, POTASSIUM CHLORIDE, CALCIUM CHLORIDE 600; 310; 30; 20 MG/100ML; MG/100ML; MG/100ML; MG/100ML
INJECTION, SOLUTION INTRAVENOUS CONTINUOUS
Status: DISCONTINUED | OUTPATIENT
Start: 2019-12-28 | End: 2019-12-28 | Stop reason: HOSPADM

## 2019-12-28 RX ORDER — FLUMAZENIL 0.1 MG/ML
0.2 INJECTION, SOLUTION INTRAVENOUS
Status: DISCONTINUED | OUTPATIENT
Start: 2019-12-28 | End: 2019-12-29 | Stop reason: HOSPADM

## 2019-12-28 RX ORDER — ONDANSETRON 2 MG/ML
4 INJECTION INTRAMUSCULAR; INTRAVENOUS EVERY 30 MIN PRN
Status: COMPLETED | OUTPATIENT
Start: 2019-12-28 | End: 2019-12-28

## 2019-12-28 RX ORDER — DEXAMETHASONE SODIUM PHOSPHATE 4 MG/ML
INJECTION, SOLUTION INTRA-ARTICULAR; INTRALESIONAL; INTRAMUSCULAR; INTRAVENOUS; SOFT TISSUE PRN
Status: DISCONTINUED | OUTPATIENT
Start: 2019-12-28 | End: 2019-12-28

## 2019-12-28 RX ORDER — ONDANSETRON 2 MG/ML
4 INJECTION INTRAMUSCULAR; INTRAVENOUS EVERY 30 MIN PRN
Status: DISCONTINUED | OUTPATIENT
Start: 2019-12-28 | End: 2019-12-28 | Stop reason: HOSPADM

## 2019-12-28 RX ORDER — SODIUM CHLORIDE 9 MG/ML
1000 INJECTION, SOLUTION INTRAVENOUS CONTINUOUS
Status: DISCONTINUED | OUTPATIENT
Start: 2019-12-28 | End: 2019-12-29 | Stop reason: HOSPADM

## 2019-12-28 RX ORDER — ONDANSETRON 4 MG/1
4 TABLET, ORALLY DISINTEGRATING ORAL EVERY 30 MIN PRN
Status: DISCONTINUED | OUTPATIENT
Start: 2019-12-28 | End: 2019-12-28 | Stop reason: HOSPADM

## 2019-12-28 RX ORDER — HYDROMORPHONE HCL/0.9% NACL/PF 0.2MG/0.2
0.2 SYRINGE (ML) INTRAVENOUS ONCE
Status: COMPLETED | OUTPATIENT
Start: 2019-12-28 | End: 2019-12-28

## 2019-12-28 RX ORDER — MEPERIDINE HYDROCHLORIDE 25 MG/ML
12.5 INJECTION INTRAMUSCULAR; INTRAVENOUS; SUBCUTANEOUS
Status: DISCONTINUED | OUTPATIENT
Start: 2019-12-28 | End: 2019-12-28 | Stop reason: HOSPADM

## 2019-12-28 RX ORDER — DIPHENHYDRAMINE HYDROCHLORIDE 50 MG/ML
25 INJECTION INTRAMUSCULAR; INTRAVENOUS ONCE
Status: COMPLETED | OUTPATIENT
Start: 2019-12-28 | End: 2019-12-28

## 2019-12-28 RX ORDER — NALOXONE HYDROCHLORIDE 0.4 MG/ML
.1-.4 INJECTION, SOLUTION INTRAMUSCULAR; INTRAVENOUS; SUBCUTANEOUS
Status: DISCONTINUED | OUTPATIENT
Start: 2019-12-28 | End: 2019-12-28 | Stop reason: HOSPADM

## 2019-12-28 RX ORDER — DIPHENHYDRAMINE HYDROCHLORIDE 50 MG/ML
25 INJECTION INTRAMUSCULAR; INTRAVENOUS EVERY 6 HOURS PRN
Status: DISCONTINUED | OUTPATIENT
Start: 2019-12-28 | End: 2019-12-28 | Stop reason: HOSPADM

## 2019-12-28 RX ORDER — PROPOFOL 10 MG/ML
INJECTION, EMULSION INTRAVENOUS PRN
Status: DISCONTINUED | OUTPATIENT
Start: 2019-12-28 | End: 2019-12-28

## 2019-12-28 RX ORDER — ONDANSETRON 4 MG/1
4 TABLET, ORALLY DISINTEGRATING ORAL EVERY 30 MIN PRN
Status: COMPLETED | OUTPATIENT
Start: 2019-12-28 | End: 2019-12-28

## 2019-12-28 RX ORDER — HEPARIN SODIUM (PORCINE) LOCK FLUSH IV SOLN 100 UNIT/ML 100 UNIT/ML
100 SOLUTION INTRAVENOUS ONCE
Status: COMPLETED | OUTPATIENT
Start: 2019-12-28 | End: 2019-12-28

## 2019-12-28 RX ORDER — FENTANYL CITRATE 50 UG/ML
INJECTION, SOLUTION INTRAMUSCULAR; INTRAVENOUS PRN
Status: DISCONTINUED | OUTPATIENT
Start: 2019-12-28 | End: 2019-12-28

## 2019-12-28 RX ORDER — TRAMADOL HYDROCHLORIDE 50 MG/1
50 TABLET ORAL EVERY 6 HOURS PRN
Status: DISCONTINUED | OUTPATIENT
Start: 2019-12-28 | End: 2019-12-29 | Stop reason: HOSPADM

## 2019-12-28 RX ORDER — HYDROMORPHONE HYDROCHLORIDE 1 MG/ML
.3-.5 INJECTION, SOLUTION INTRAMUSCULAR; INTRAVENOUS; SUBCUTANEOUS EVERY 5 MIN PRN
Status: DISCONTINUED | OUTPATIENT
Start: 2019-12-28 | End: 2019-12-28 | Stop reason: HOSPADM

## 2019-12-28 RX ORDER — EPHEDRINE SULFATE 50 MG/ML
INJECTION, SOLUTION INTRAMUSCULAR; INTRAVENOUS; SUBCUTANEOUS PRN
Status: DISCONTINUED | OUTPATIENT
Start: 2019-12-28 | End: 2019-12-28

## 2019-12-28 RX ORDER — LIDOCAINE 40 MG/G
CREAM TOPICAL
Status: DISCONTINUED | OUTPATIENT
Start: 2019-12-28 | End: 2019-12-29 | Stop reason: HOSPADM

## 2019-12-28 RX ORDER — SODIUM CHLORIDE, SODIUM LACTATE, POTASSIUM CHLORIDE, CALCIUM CHLORIDE 600; 310; 30; 20 MG/100ML; MG/100ML; MG/100ML; MG/100ML
INJECTION, SOLUTION INTRAVENOUS CONTINUOUS PRN
Status: DISCONTINUED | OUTPATIENT
Start: 2019-12-28 | End: 2019-12-28

## 2019-12-28 RX ADMIN — ROCURONIUM BROMIDE 30 MG: 10 INJECTION INTRAVENOUS at 18:43

## 2019-12-28 RX ADMIN — PHENYLEPHRINE HYDROCHLORIDE 100 MCG: 10 INJECTION INTRAVENOUS at 19:01

## 2019-12-28 RX ADMIN — MIDAZOLAM 2 MG: 1 INJECTION INTRAMUSCULAR; INTRAVENOUS at 18:19

## 2019-12-28 RX ADMIN — ONDANSETRON 4 MG: 2 INJECTION INTRAMUSCULAR; INTRAVENOUS at 19:14

## 2019-12-28 RX ADMIN — Medication 100 MG: at 18:33

## 2019-12-28 RX ADMIN — MIDAZOLAM 1 MG: 1 INJECTION INTRAMUSCULAR; INTRAVENOUS at 20:03

## 2019-12-28 RX ADMIN — Medication 5 MG: at 18:40

## 2019-12-28 RX ADMIN — DIPHENHYDRAMINE HYDROCHLORIDE 25 MG: 50 INJECTION, SOLUTION INTRAMUSCULAR; INTRAVENOUS at 20:38

## 2019-12-28 RX ADMIN — MIDAZOLAM 2 MG: 1 INJECTION INTRAMUSCULAR; INTRAVENOUS at 18:08

## 2019-12-28 RX ADMIN — FENTANYL CITRATE 25 MCG: 50 INJECTION, SOLUTION INTRAMUSCULAR; INTRAVENOUS at 19:24

## 2019-12-28 RX ADMIN — PROPOFOL 140 MG: 10 INJECTION, EMULSION INTRAVENOUS at 18:33

## 2019-12-28 RX ADMIN — PHENYLEPHRINE HYDROCHLORIDE 100 MCG: 10 INJECTION INTRAVENOUS at 18:41

## 2019-12-28 RX ADMIN — ONDANSETRON 4 MG: 2 INJECTION INTRAMUSCULAR; INTRAVENOUS at 20:09

## 2019-12-28 RX ADMIN — Medication 5 MG: at 18:50

## 2019-12-28 RX ADMIN — PHENYLEPHRINE HYDROCHLORIDE 100 MCG: 10 INJECTION INTRAVENOUS at 18:50

## 2019-12-28 RX ADMIN — TRAMADOL HYDROCHLORIDE 50 MG: 50 TABLET, FILM COATED ORAL at 20:16

## 2019-12-28 RX ADMIN — SODIUM CHLORIDE, POTASSIUM CHLORIDE, SODIUM LACTATE AND CALCIUM CHLORIDE: 600; 310; 30; 20 INJECTION, SOLUTION INTRAVENOUS at 18:14

## 2019-12-28 RX ADMIN — FENTANYL CITRATE 25 MCG: 50 INJECTION, SOLUTION INTRAMUSCULAR; INTRAVENOUS at 19:20

## 2019-12-28 RX ADMIN — SUGAMMADEX 200 MG: 100 INJECTION, SOLUTION INTRAVENOUS at 19:14

## 2019-12-28 RX ADMIN — SODIUM CHLORIDE 1000 ML: 9 INJECTION, SOLUTION INTRAVENOUS at 17:45

## 2019-12-28 RX ADMIN — FENTANYL CITRATE 50 MCG: 50 INJECTION, SOLUTION INTRAMUSCULAR; INTRAVENOUS at 19:43

## 2019-12-28 RX ADMIN — FENTANYL CITRATE 50 MCG: 50 INJECTION, SOLUTION INTRAMUSCULAR; INTRAVENOUS at 19:47

## 2019-12-28 RX ADMIN — DEXAMETHASONE SODIUM PHOSPHATE 10 MG: 4 INJECTION, SOLUTION INTRA-ARTICULAR; INTRALESIONAL; INTRAMUSCULAR; INTRAVENOUS; SOFT TISSUE at 18:47

## 2019-12-28 RX ADMIN — FENTANYL CITRATE 150 MCG: 50 INJECTION, SOLUTION INTRAMUSCULAR; INTRAVENOUS at 18:33

## 2019-12-28 RX ADMIN — Medication 0.2 MG: at 17:46

## 2019-12-28 RX ADMIN — Medication 5 MG: at 19:01

## 2019-12-28 RX ADMIN — HEPARIN 100 UNITS: 100 SYRINGE at 22:38

## 2019-12-28 RX ADMIN — HYDROMORPHONE HYDROCHLORIDE 0.5 MG: 1 INJECTION, SOLUTION INTRAMUSCULAR; INTRAVENOUS; SUBCUTANEOUS at 20:25

## 2019-12-28 RX ADMIN — FENTANYL CITRATE 50 MCG: 50 INJECTION, SOLUTION INTRAMUSCULAR; INTRAVENOUS at 19:32

## 2019-12-28 RX ADMIN — DIPHENHYDRAMINE HYDROCHLORIDE 25 MG: 50 INJECTION, SOLUTION INTRAMUSCULAR; INTRAVENOUS at 20:56

## 2019-12-28 ASSESSMENT — PAIN DESCRIPTION - DESCRIPTORS: DESCRIPTORS: SHARP

## 2019-12-28 ASSESSMENT — MIFFLIN-ST. JEOR: SCORE: 1771.88

## 2019-12-28 ASSESSMENT — ENCOUNTER SYMPTOMS
ABDOMINAL PAIN: 1
NAUSEA: 0
DIARRHEA: 1
VOMITING: 0

## 2019-12-28 NOTE — ED TRIAGE NOTES
Pt BIBA for sudden onset abdominal pain. Hx appendectomy in November. Pt reports she swallowed two 4-inch pen springs a couple hours ago. VSS.

## 2019-12-28 NOTE — ANESTHESIA PREPROCEDURE EVALUATION
Anesthesia Pre-Procedure Evaluation    Patient: Nevin Alvarado   MRN:     [id number] Gender:   female   Age:    29 year old :      1990        Preoperative Diagnosis: Foreign body alimentary tract [T18.9XXA]   Procedure(s):  ESOPHAGOGASTRODUODENOSCOPY (EGD)     Past Medical History:   Diagnosis Date     ADD (attention deficit disorder)      Anorexia nervosa with bulimia     history of; on Topamax     Anxiety      Borderline personality disorder (H)      Depression      Depressive disorder      H/O self-harm      Lives in independent group home     due to debilitating mental illness     Migraine without aura     no known triggers; on Topamax bid and Imitrex PRN     Morbid obesity (H)      PTSD (post-traumatic stress disorder)      Rectal foreign body - Recurrent issue, self placed      Swallowed foreign body - Recurrent issue, self placed       Past Surgical History:   Procedure Laterality Date     COMBINED ESOPHAGOSCOPY, GASTROSCOPY, DUODENOSCOPY (EGD), REPLACE ESOPHAGEAL STENT N/A 10/9/2019    Procedure: Upper Endoscopy with Suture Placement;  Surgeon: Zurdo Ramirez MD;  Location: [facility]     ESOPHAGOSCOPY, GASTROSCOPY, DUODENOSCOPY (EGD), COMBINED N/A 3/9/2017    Procedure: COMBINED ESOPHAGOSCOPY, GASTROSCOPY, DUODENOSCOPY (EGD), REMOVE FOREIGN BODY;  Surgeon: Avis Guzmán MD;  Location: [facility]     ESOPHAGOSCOPY, GASTROSCOPY, DUODENOSCOPY (EGD), COMBINED N/A 2017    Procedure: COMBINED ESOPHAGOSCOPY, GASTROSCOPY, DUODENOSCOPY (EGD), REMOVE FOREIGN BODY;  EGD removal Foregin body;  Surgeon: Lokesh Paula MD;  Location: [facility]     ESOPHAGOSCOPY, GASTROSCOPY, DUODENOSCOPY (EGD), COMBINED N/A 2017    Procedure: COMBINED ESOPHAGOSCOPY, GASTROSCOPY, DUODENOSCOPY (EGD);  COMBINED ESOPHAGOSCOPY, GASTROSCOPY, DUODENOSCOPY (EGD) [[id number]]attempted removal of foreign body;  Surgeon: Pamela Perez MD;  Location: [facility]     ESOPHAGOSCOPY, GASTROSCOPY,  DUODENOSCOPY (EGD), COMBINED N/A 6/9/2017    Procedure: COMBINED ESOPHAGOSCOPY, GASTROSCOPY, DUODENOSCOPY (EGD), REMOVE FOREIGN BODY;  Esophagoscopy, Gastroscopy, Duodenoscopy, Removal of Foreign Body;  Surgeon: Dejon Marsh MD;  [facility]: [facility]     ESOPHAGOSCOPY, GASTROSCOPY, DUODENOSCOPY (EGD), COMBINED N/A 1/6/2018    Procedure: COMBINED ESOPHAGOSCOPY, GASTROSCOPY, DUODENOSCOPY (EGD), REMOVE FOREIGN BODY;  COMBINED ESOPHAGOSCOPY, GASTROSCOPY, DUODENOSCOPY (EGD) [by pascal net and snare with profol sedation;  Surgeon: Feliciano Emmanuel MD;  Location:  GI     ESOPHAGOSCOPY, GASTROSCOPY, DUODENOSCOPY (EGD), COMBINED N/A 3/19/2018    Procedure: COMBINED ESOPHAGOSCOPY, GASTROSCOPY, DUODENOSCOPY (EGD), REMOVE FOREIGN BODY;   Esophagodscopy, Gastroscopy, Duodenoscopy,Foreign Body Removal;  Surgeon: Brice Guzmán MD;  [facility]: [facility]     ESOPHAGOSCOPY, GASTROSCOPY, DUODENOSCOPY (EGD), COMBINED N/A 4/16/2018    Procedure: COMBINED ESOPHAGOSCOPY, GASTROSCOPY, DUODENOSCOPY (EGD), REMOVE FOREIGN BODY;  Esophagogastroduodenoscopy  Foreign Body Removal X 2;  Surgeon: Royer Moise MD;  [facility]: [facility]     ESOPHAGOSCOPY, GASTROSCOPY, DUODENOSCOPY (EGD), COMBINED N/A 6/1/2018    Procedure: COMBINED ESOPHAGOSCOPY, GASTROSCOPY, DUODENOSCOPY (EGD), REMOVE FOREIGN BODY;  COMBINED ESOPHAGOSCOPY, GASTROSCOPY, DUODENOSCOPY with removal of foreign body, propofol sedation by anesthesia;  Surgeon: Brice Martinez MD;  Location:  GI     ESOPHAGOSCOPY, GASTROSCOPY, DUODENOSCOPY (EGD), COMBINED N/A 7/25/2018    Procedure: COMBINED ESOPHAGOSCOPY, GASTROSCOPY, DUODENOSCOPY (EGD), REMOVE FOREIGN BODY;;  Surgeon: Candy Castelan MD;  Location:  GI     ESOPHAGOSCOPY, GASTROSCOPY, DUODENOSCOPY (EGD), COMBINED N/A 7/28/2018    Procedure: COMBINED ESOPHAGOSCOPY, GASTROSCOPY, DUODENOSCOPY (EGD), REMOVE FOREIGN BODY;  COMBINED ESOPHAGOSCOPY, GASTROSCOPY, DUODENOSCOPY (EGD), REMOVE FOREIGN BODY;   Surgeon: Brice Guzmán MD;  [facility]     ESOPHAGOSCOPY, GASTROSCOPY, DUODENOSCOPY (EGD), COMBINED N/A 7/31/2018    Procedure: COMBINED ESOPHAGOSCOPY, GASTROSCOPY, DUODENOSCOPY (EGD);  COMBINED ESOPHAGOSCOPY, GASTROSCOPY, DUODENOSCOPY (EGD) TO REMOVE FOREIGN BODY;  Surgeon: Lokesh Paula MD;  [facility]: [facility]     ESOPHAGOSCOPY, GASTROSCOPY, DUODENOSCOPY (EGD), COMBINED N/A 8/4/2018    Procedure: COMBINED ESOPHAGOSCOPY, GASTROSCOPY, DUODENOSCOPY (EGD), REMOVE FOREIGN BODY;   combined esophagoscopy, gastroscopy, duodenoscopy, REMOVE FOREIGN BODY ;  Surgeon: Lokesh Paula MD;  [facility]: [facility]     ESOPHAGOSCOPY, GASTROSCOPY, DUODENOSCOPY (EGD), COMBINED N/A 10/6/2019    Procedure: ESOPHAGOGASTRODUODENOSCOPY (EGD) with fireign body removal x2, esophageal stent placement with floroscopy;  Surgeon: Timoteo Espana MD;  [facility]     ESOPHAGOSCOPY, GASTROSCOPY, DUODENOSCOPY (EGD), COMBINED N/A 12/2/2019    Procedure: Esophagogastroduodenoscopy with esophageal stent removal, esophogram;  Surgeon: Kailee Tena MD;  [facility]     ESOPHAGOSCOPY, GASTROSCOPY, DUODENOSCOPY (EGD), COMBINED N/A 12/17/2019    Procedure: ESOPHAGOGASTRODUODENOSCOPY, WITH FOREIGN BODY REMOVAL;  Surgeon: Pamela Perez MD;  [facility]     ESOPHAGOSCOPY, GASTROSCOPY, DUODENOSCOPY (EGD), COMBINED N/A 12/13/2019    Procedure: ESOPHAGOGASTRODUODENOSCOPY, WITH FOREIGN BODY REMOVAL;  Surgeon: Samia Stanton MD;  [facility]     EXAM UNDER ANESTHESIA ANUS N/A 1/10/2017    Procedure: EXAM UNDER ANESTHESIA ANUS;  Surgeon: Annmarie Haynes MD;  [facility]     EXAM UNDER ANESTHESIA RECTUM N/A 7/19/2018    Procedure: EXAM UNDER ANESTHESIA RECTUM;  EXAM UNDER ANESTHESIA, REMOVAL OF RECTAL FOREIGN BODY;  Surgeon: Annmarie Haynes MD;  [facility]: [facility]     HC REMOVE FECAL IMPACTION OR FB W ANESTHESIA N/A 12/18/2016    Procedure: REMOVE FECAL IMPACTION/FOREIGN BODY UNDER  ANESTHESIA;  Surgeon: Soham Cano MD;  Location: [facility]     KNEE SURGERY - removed a small tissue mass from knee Right      LAPAROSCOPIC ABLATION ENDOMETRIOSIS       LAPAROTOMY EXPLORATORY N/A 1/10/2017    Procedure: LAPAROTOMY EXPLORATORY;  Surgeon: Annmarie Haynes MD;  [facility]: [facility]     LAPAROTOMY EXPLORATORY  09/11/2019    Manual manipulation of bowel to remove pill bottle in rectum     lymph nodes removed from neck; benign  age 7     MAMMOPLASTY REDUCTION Bilateral      RELEASE CARPAL TUNNEL Bilateral      SIGMOIDOSCOPY FLEXIBLE N/A 1/10/2017    Procedure: SIGMOIDOSCOPY FLEXIBLE;  Surgeon: Annmarie Haynes MD;  [facility]     SIGMOIDOSCOPY FLEXIBLE N/A 5/8/2018    Procedure: SIGMOIDOSCOPY FLEXIBLE;  flex sig with foreign body removal using snare and rattooth forcep;  Surgeon: Soham Cano MD;  Location:  GI     SIGMOIDOSCOPY FLEXIBLE N/A 2/20/2019    Procedure: Exam under anesthesia Colonoscopy with attempted  removal of rectal foreign body;  Surgeon: Estrada Chávez MD;  Location: [facility]          Anesthesia Evaluation     . Pt has had prior anesthetic. Type: General and MAC    No history of anesthetic complications          ROS/MED HX    ENT/Pulmonary:  - neg pulmonary ROS   (+)ZOHRA risk factors obese, , . .    Neurologic:  - neg neurologic ROS     Cardiovascular:     (+) ----. : . . fainting (syncope). :. .       METS/Exercise Tolerance:  >4 METS   Hematologic: Comments: Anticoagulation held >12h - neg hematologic  ROS   (+) History of blood clots pt is anticoagulated, -      Musculoskeletal:  - neg musculoskeletal ROS       GI/Hepatic: Comment: S/p multiple foreign body ingestions and rectal insertions resulting in esophogeal perforation, stenting 10/9/2019, stricture, pain  Stent removed 12/03  Now with new esophageal perf from attempted EGD removal of samuel ruffin        Renal/Genitourinary:  - ROS Renal section negative       Endo:  - neg endo ROS   (+)  Obesity, .      Psychiatric: Comment: PTSD, Intermittent suicidal ideation      (+) psychiatric history anxiety, depression and bipolar      Infectious Disease:  - neg infectious disease ROS       Malignancy:      - no malignancy   Other:    (+) No chance of pregnancy C-spine cleared: N/A, no H/O Chronic Pain,no other significant disability   - neg other ROS                     PHYSICAL EXAM:   Mental Status/Neuro: A/A/O   Airway:   Mallampati: III  Mouth/Opening: Full  TM distance: > 6 cm  Neck ROM: Full   Respiratory: Auscultation: CTAB     Resp. Rate: Normal     Resp. Effort: Normal      CV: Rhythm: Regular  Rate: Age appropriate  Heart: Normal Sounds  Edema: None   Comments: bmi 44. Satisfactory airway      Dental: Normal Dentition                LABS:  CBC:   Lab Results   Component Value Date    WBC 10.0 12/28/2019    WBC 9.5 12/18/2019    HGB 11.6 (L) 12/28/2019    HGB 11.6 (L) 12/18/2019    HCT 36.4 12/28/2019    HCT 37.1 12/18/2019     12/28/2019     12/18/2019     BMP:   Lab Results   Component Value Date     12/28/2019     12/17/2019    POTASSIUM 3.6 12/28/2019    POTASSIUM 3.6 12/17/2019    CHLORIDE 114 (H) 12/28/2019    CHLORIDE 112 (H) 12/17/2019    CO2 24 12/28/2019    CO2 25 12/17/2019    BUN 6 (L) 12/28/2019    BUN 10 12/17/2019    CR 0.48 (L) 12/28/2019    CR 0.53 12/17/2019     (H) 12/28/2019    GLC 94 12/17/2019     COAGS:   Lab Results   Component Value Date    PTT 51 (H) 12/28/2019    INR 1.42 (H) 12/28/2019     POC:   Lab Results   Component Value Date    BGM 99 11/03/2019    HCG Negative 12/02/2019    HCGS Negative 11/28/2019     OTHER:   Lab Results   Component Value Date    LACT 1.0 11/28/2019    KELBY 8.5 12/28/2019    PHOS 3.5 12/01/2019    MAG 1.9 12/05/2019    ALBUMIN 3.1 (L) 12/09/2019    PROTTOTAL 6.2 (L) 12/09/2019    ALT 24 12/09/2019    AST 12 12/09/2019    ALKPHOS 73 12/09/2019    BILITOTAL 0.2 12/09/2019    LIPASE 40 (L) 12/04/2019    TSH 0.66  "03/21/2018    T4 0.96 05/16/2017    CRP 6.6 12/09/2019    SED 26 (H) 07/11/2017        Preop Vitals    BP Readings from Last 3 Encounters:   12/28/19 127/81   12/18/19 108/74   12/14/19 (!) 152/66    Pulse Readings from Last 3 Encounters:   12/28/19 91   12/18/19 96   12/14/19 89      Resp Readings from Last 3 Encounters:   12/28/19 18   12/18/19 16   12/14/19 18    SpO2 Readings from Last 3 Encounters:   12/28/19 98%   12/18/19 95%   12/14/19 96%      Temp Readings from Last 1 Encounters:   12/28/19 36.8  C (98.2  F) (Oral)    Ht Readings from Last 1 Encounters:   12/28/19 1.575 m (5' 2\")      Wt Readings from Last 1 Encounters:   12/28/19 108.9 kg (240 lb)    Estimated body mass index is 43.9 kg/m  as calculated from the following:    Height as of this encounter: 1.575 m (5' 2\").    Weight as of this encounter: 108.9 kg (240 lb).     LDA:  Port A Cath Single 12/17/19 Right Chest wall (Active)   Access Date 12/28/19 12/28/2019  5:11 PM   Access Attempts 1 12/28/2019  5:11 PM   Gauge Power noncoring 90 degree bend;20 gauge;3/4 inch 12/28/2019  5:11 PM   Site Assessment WDL 12/28/2019  5:11 PM   Line Status Blood return noted;Saline locked 12/28/2019  5:11 PM   Dressing Intervention Transparent 12/28/2019  5:11 PM   Line Necessity Yes, meets criteria 12/28/2019  5:11 PM   Number of days: 11        Assessment:   ASA SCORE: 3 emergent   H&P: History and physical reviewed and following examination; no interval change.   Smoking Status:  Non-Smoker/Unknown   NPO Status: ELEVATED Aspiration Risk/Unknown     Plan:   Anes. Type:  General   Pre-Medication: Midazolam   Induction:  IV (RSI)   Airway: ETT; Oral   Access/Monitoring: PIV   Maintenance: Balanced     Postop Plan:   Postop Pain: Opioids  Postop Sedation/Airway: Not planned  Disposition: Inpatient/Admit     PONV Management: Adult Risk Factors: Female   Prevention: Ondansetron     CONSENT: Deferred (Emergency)   Plan and risks discussed with: Not Applicable   Blood " Products: N/a       Comments for Plan/Consent:  29 year old female, ASA 3, with history of foreign body ingestion and rectal insertion, s/p multiple operative retrivals and resulting esophogeal strictures presenting for EGD emergently for suddent onset abdomina pain s/p ingestion of 2 pins.    - RSI GA with ETT                 Siddharth Castaneda III, MD
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
## 2019-12-28 NOTE — ED AVS SNAPSHOT
[facility], [place], Emergency Department  [address] [place] [id number]  Phone:  [phone]                                    Nevin Alvarado   MRN: [id number]    Department:  [facility], Emergency Department   Date of Visit:  12/28/2019           After Visit Summary Signature Page    I have received my discharge instructions, and my questions have been answered. I have discussed any challenges I see with this plan with the nurse or doctor.    ..........................................................................................................................................  Patient/Patient Representative Signature      ..........................................................................................................................................  Patient Representative Print Name and Relationship to Patient    ..................................................               ................................................  Date                                   Time    ..........................................................................................................................................  Reviewed by Signature/Title    ...................................................              ..............................................  Date                                               Time          22EPIC Rev 08/18

## 2019-12-28 NOTE — ED NOTES
Bed: ED05  Expected date:   Expected time:   Means of arrival:   Comments:  [place] [employer] with a 30 yo F c/o sudden onset abdominal pain. VSS.

## 2019-12-29 NOTE — ED NOTES
Patient placed on Health Officer Hold per Dr. Schofield verbal order. Security at bedside, room and belonging search completed.

## 2019-12-29 NOTE — ANESTHESIA POSTPROCEDURE EVALUATION
Anesthesia POST Procedure Evaluation    Patient: Nevin Alvarado   MRN:     [id number] Gender:   female   Age:    29 year old :      1990        Preoperative Diagnosis: Foreign body alimentary tract [T18.9XXA]   Procedure(s):  ESOPHAGOGASTRODUODENOSCOPY (EGD) Removal of Foreign Body X 2   Postop Comments: No value filed.       Anesthesia Type:  Not documented  General    Reportable Event: NO     PAIN: Uncomplicated   Sign Out status: Comfortable, Well controlled pain     PONV: No PONV   Sign Out status:  No Nausea or Vomiting     Neuro/Psych: Uneventful perioperative course   Sign Out Status: Preoperative baseline; Age appropriate mentation     Airway/Resp.: Uneventful perioperative course   Sign Out Status: Non labored breathing, age appropriate RR; Resp. Status within EXPECTED Parameters     CV: Uneventful perioperative course   Sign Out status: Appropriate BP and perfusion indices; Appropriate HR/Rhythm     Disposition:   Sign Out in:  PACU  Disposition:  Phase II; Home  Recovery Course: Uneventful  Follow-Up: Not required           Last Anesthesia Record Vitals:  CRNA VITALS  2019 1850 - 2019 1950      2019             Resp Rate (observed):  (!) 2          Last PACU Vitals:  Vitals Value Taken Time   /89 2019  7:50 PM   Temp 36.7  C (98.1  F) 2019  7:25 PM   Pulse 102 2019  7:50 PM   Resp 14 2019  7:45 PM   SpO2 99 % 2019  7:57 PM   Temp src     NIBP     Pulse     SpO2     Resp     Temp     Ht Rate     Temp 2     Vitals shown include unvalidated device data.      Electronically Signed By: Faroqo Saleem MD, 2019, 7:58 PM

## 2019-12-29 NOTE — PROCEDURES
[facility] Brief Operative Note    Procedure:  Upper endoscopy   Post-operative diagnosis:  foreign body removal x2   Staff Physician:  Allie   Fellow/Assistant(s):  Jonny    Specimens:  Please see final procedure note for further details.   Findings:  esophageal abnormality/stricture at 35cm which was easily passed with the adult endoscope. Z line regular at 38cm. Large amount of food (tater tots) in gastric body and antrum. There were two pen springs that were coiled/blunt on one end and completely straightened on the other, with the majority of spring being straight. These were individually grasped by the coiled/blunt end with a rat-toothed forceps and removed. The esophagus and stomach were reinspected without any signs of damage (body, antrum and duodenum unable to be cleared due to food, but have very low suspicion of injury).   Complications:  None.   Condition:  Stable   Recommendations  Diet:  Return to previous diet  PPI:  N/A  Anti-coagulants/platelets:  N/A  Octreotide:  N/A  Discharge Planning:   Return to ED.   Patient is starting outpatient therapy next week.   This is her 3rd endoscopy in 1 week. I asked her how are we going to help her from coming back tomorrow- she said she has no more pens at her house and her [place] ornaments are put away and there is nothing else she thinks she would swallow.

## 2019-12-29 NOTE — OR NURSING
Pt was c/o pain. Dr. Saleem instructed to stop giving Fentanyl, and ordered 1 mg Midazolam IV and oral Tramadol. The pt stated that oxycodone does not help her at all, so she declined his first suggestion of oxycodone. Pt was still c/o pain afterward, and Dr. Saleem ordered IV Dilaudid 0.5 IV. Pt then started itching and requested benadryl. 25 mg of Benadryl was ordered and given IV.
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
## 2019-12-29 NOTE — ED NOTES
SIGN-OUT:  - Assumed care of this patient from Dr. Basurto  - Pending at shift change: Return of pt from procedural suite/endoscopy  - Tentative plan from original EM Physician: F/U w/ GI after endoscopy. If no complication, discharge back to group home. If needed, admitted for further management, etc.     RE-EVALUATION / REASSESSMENT:  - Pt returned from procedural suite: [place] removed reportedly w/o issue. Received Fentanyl, versed, decadron, zofran, tramadol, dilaudid and benadryl (for itching after narcotics, usual sx for patient).   - Pt kept on 1:1 safety watch here to ensure no re-ingestion  - Reassessed pt following procedure, doing well, tolerated PO. Able to ambulate w/o issue.   - Pt reports she believes she will do better, and not swallow again (starting up therapy again this week, and did much better in that regard when was undergoing therapy previously).   - No acute issues or interventions necessary while still in the emergency department.    DISPOSITION:  - Discuss w/ GI Attending: No concerning findings intra-procedurally. OK to discharge from their standpoint. Recommend pt attempt to not ingest.   - Patient will be discharged as per the original disposition plan from initial EM physician.  --- Pt says has no access to other things to swallow at this time, and believes she will do better, looking forward to starting therapy this week, says does much better when undergoing therapy (has not gone in over a year per her report).   - Reviewed plan, safety/strict return instructions.   - Left message for Care Coordinator to have pt's regular provider, Psych teams discuss if needs commitment, etc. Follow closely, etc.              Yajaira Schofield MD  12/29/19 1932

## 2019-12-29 NOTE — DISCHARGE INSTRUCTIONS
FOLLOW-UP:  Please make an appointment to follow up with:  - Your Primary Care Provider and Therapy/Psych teams within the week.   - Have your provider review the results from today's visit with you again to make sure no further follow-up or additional testing is needed based on those results.   - Return to the Emergency Department with any new or worsening symptoms, any concerns, or any new ingestions/self-injury behaviors.     OTHER INSTRUCTIONS:  - Do your best to stay hydrated.  - Do not ingest foreign bodies.     RETURN TO THE EMERGENCY DEPARTMENT  Return to the Emergency Department immediately for any new or worsening symptoms or any concerns, any new ingestions.

## 2020-01-01 ENCOUNTER — APPOINTMENT (OUTPATIENT)
Dept: ULTRASOUND IMAGING | Facility: CLINIC | Age: 30
End: 2020-01-01
Attending: EMERGENCY MEDICINE
Payer: COMMERCIAL

## 2020-01-01 ENCOUNTER — HOSPITAL ENCOUNTER (EMERGENCY)
Facility: CLINIC | Age: 30
Discharge: HOME | End: 2020-01-01
Attending: EMERGENCY MEDICINE | Admitting: EMERGENCY MEDICINE
Payer: COMMERCIAL

## 2020-01-01 VITALS
HEIGHT: 61 IN | OXYGEN SATURATION: 98 % | TEMPERATURE: 98.1 F | SYSTOLIC BLOOD PRESSURE: 127 MMHG | WEIGHT: 240 LBS | DIASTOLIC BLOOD PRESSURE: 77 MMHG | BODY MASS INDEX: 45.31 KG/M2 | RESPIRATION RATE: 12 BRPM | HEART RATE: 80 BPM

## 2020-01-01 DIAGNOSIS — N93.9 VAGINAL BLEEDING: ICD-10-CM

## 2020-01-01 DIAGNOSIS — R10.31 ABDOMINAL PAIN, RIGHT LOWER QUADRANT: ICD-10-CM

## 2020-01-01 DIAGNOSIS — Z97.5 PRESENCE OF INTRAUTERINE CONTRACEPTIVE DEVICE: ICD-10-CM

## 2020-01-01 LAB
BASOPHILS # BLD AUTO: 0 10E9/L (ref 0–0.2)
BASOPHILS NFR BLD AUTO: 0.3 %
DIFFERENTIAL METHOD BLD: NORMAL
EOSINOPHIL # BLD AUTO: 0.1 10E9/L (ref 0–0.7)
EOSINOPHIL NFR BLD AUTO: 1.3 %
ERYTHROCYTE [DISTWIDTH] IN BLOOD BY AUTOMATED COUNT: 14.2 % (ref 10–15)
HCT VFR BLD AUTO: 36.5 % (ref 35–47)
HGB BLD-MCNC: 11.9 G/DL (ref 11.7–15.7)
IMM GRANULOCYTES # BLD: 0 10E9/L (ref 0–0.4)
IMM GRANULOCYTES NFR BLD: 0.4 %
LYMPHOCYTES # BLD AUTO: 1.8 10E9/L (ref 0.8–5.3)
LYMPHOCYTES NFR BLD AUTO: 18.5 %
MCH RBC QN AUTO: 29 PG (ref 26.5–33)
MCHC RBC AUTO-ENTMCNC: 32.6 G/DL (ref 31.5–36.5)
MCV RBC AUTO: 89 FL (ref 78–100)
MONOCYTES # BLD AUTO: 0.7 10E9/L (ref 0–1.3)
MONOCYTES NFR BLD AUTO: 6.9 %
NEUTROPHILS # BLD AUTO: 6.9 10E9/L (ref 1.6–8.3)
NEUTROPHILS NFR BLD AUTO: 72.6 %
NRBC # BLD AUTO: 0 10*3/UL
NRBC BLD AUTO-RTO: 0 /100
PLATELET # BLD AUTO: 258 10E9/L (ref 150–450)
RBC # BLD AUTO: 4.1 10E12/L (ref 3.8–5.2)
WBC # BLD AUTO: 9.5 10E9/L (ref 4–11)

## 2020-01-01 PROCEDURE — 85025 COMPLETE CBC W/AUTO DIFF WBC: CPT | Performed by: EMERGENCY MEDICINE

## 2020-01-01 PROCEDURE — 76856 US EXAM PELVIC COMPLETE: CPT

## 2020-01-01 PROCEDURE — 25000128 H RX IP 250 OP 636: Performed by: EMERGENCY MEDICINE

## 2020-01-01 PROCEDURE — 99285 EMERGENCY DEPT VISIT HI MDM: CPT | Mod: Z6 | Performed by: EMERGENCY MEDICINE

## 2020-01-01 PROCEDURE — 99285 EMERGENCY DEPT VISIT HI MDM: CPT | Mod: 25 | Performed by: EMERGENCY MEDICINE

## 2020-01-01 RX ORDER — HEPARIN SODIUM (PORCINE) LOCK FLUSH IV SOLN 100 UNIT/ML 100 UNIT/ML
5 SOLUTION INTRAVENOUS ONCE
Status: COMPLETED | OUTPATIENT
Start: 2020-01-01 | End: 2020-01-01

## 2020-01-01 RX ADMIN — HEPARIN 5 ML: 100 SYRINGE at 18:15

## 2020-01-01 ASSESSMENT — ENCOUNTER SYMPTOMS
ABDOMINAL PAIN: 1
NAUSEA: 0
FEVER: 0
VOMITING: 0

## 2020-01-01 ASSESSMENT — MIFFLIN-ST. JEOR: SCORE: 1756.01

## 2020-01-01 NOTE — ED TRIAGE NOTES
Triage Assessment & Note:      Patient presents with: Pt BIBA from home with reports of abdominal pain. Pt swallowed pen springs the other day and taken to OSH for surgery. Pt reports vaginal bleeding. No reports of fever, cough, SOB, or CP. Watch started and room made safe.     Home Treatments/Remedies: Home medciations    Febrile / Afebrile: afebrile    Duration of C/o: n/a    Flower Johnson RN  January 1, 2020

## 2020-01-01 NOTE — ED AVS SNAPSHOT
[facility] [place], Emergency Department  [address] [place] [id number]  Phone:  [phone]                                    Nevin Alvarado   MRN: [id number]    Department:  [facility], Emergency Department   Date of Visit:  1/1/2020           After Visit Summary Signature Page    I have received my discharge instructions, and my questions have been answered. I have discussed any challenges I see with this plan with the nurse or doctor.    ..........................................................................................................................................  Patient/Patient Representative Signature      ..........................................................................................................................................  Patient Representative Print Name and Relationship to Patient    ..................................................               ................................................  Date                                   Time    ..........................................................................................................................................  Reviewed by Signature/Title    ...................................................              ..............................................  Date                                               Time          22EPIC Rev 08/18

## 2020-01-01 NOTE — ED PROVIDER NOTES
"    [place] EMERGENCY DEPARTMENT ([facility])  January 1, 2020  History     Chief Complaint   Patient presents with     Abdominal Pain     Vaginal Bleeding     The history is provided by the patient and medical records.     Nevin Alvarado is a 29 year old female past medical history for borderline personality disorder, depression, PTSD, anxiety, recurrent interventions for swallowed and rectal foreign bodies, chronic abdominal pain, PE (on apixaban) who presents to the ED for complaint of vaginal bleeding. Patient complains of having constant abdominal pain that started yesterday with quality of vaginal bleeding as \"white cloth-like, jelly substances\". Reports of bleeding as if \"someone was on her period\" noting to be on blood thinner. States her last vaginal bleeding was 6 years ago. Here, she reports use of pad this morning and denies pain in area of bleeding. She denies inserting any foreign objects into her vagina or rectum. She denies vaginal or pelvic cramp. Denies breast tenderness or mood swings. Patient denies history of ovarian cyst. Patient states she is not sexually active. Denies fever, nausea or vomiting.     Per patient's chart review, patient's was seen at [facility] 12/30/2019 for esophageal damage and strictures from ingestion of foreign body objects this time ingesting 2 pen springs required an EGD to obtain objects.    Past Medical History:   Diagnosis Date     ADD (attention deficit disorder)      Anorexia nervosa with bulimia     history of; on Topamax     Anxiety      Borderline personality disorder (H)      Depression      Depressive disorder      H/O self-harm      Lives in independent group home     due to debilitating mental illness     Migraine without aura     no known triggers; on Topamax bid and Imitrex PRN     Morbid obesity (H)      PTSD (post-traumatic stress disorder)      Rectal foreign body - Recurrent issue, self placed      Swallowed foreign body - " Recurrent issue, self placed      Past Surgical History:   Procedure Laterality Date     COMBINED ESOPHAGOSCOPY, GASTROSCOPY, DUODENOSCOPY (EGD), REPLACE ESOPHAGEAL STENT N/A 10/9/2019    Procedure: Upper Endoscopy with Suture Placement;  Surgeon: Zurdo Ramirez MD;  [facility]: [facility]     ESOPHAGOSCOPY, GASTROSCOPY, DUODENOSCOPY (EGD), COMBINED N/A 3/9/2017    Procedure: COMBINED ESOPHAGOSCOPY, GASTROSCOPY, DUODENOSCOPY (EGD), REMOVE FOREIGN BODY;  Surgeon: Avis Guzmán MD;  [facility]: [facility]     ESOPHAGOSCOPY, GASTROSCOPY, DUODENOSCOPY (EGD), COMBINED N/A 4/20/2017    Procedure: COMBINED ESOPHAGOSCOPY, GASTROSCOPY, DUODENOSCOPY (EGD), REMOVE FOREIGN BODY;  EGD removal Foregin body;  Surgeon: Lokesh Paula MD;  [facility]     ESOPHAGOSCOPY, GASTROSCOPY, DUODENOSCOPY (EGD), COMBINED N/A 6/12/2017    Procedure: COMBINED ESOPHAGOSCOPY, GASTROSCOPY, DUODENOSCOPY (EGD);  COMBINED ESOPHAGOSCOPY, GASTROSCOPY, DUODENOSCOPY (EGD) [[id number]]attempted removal of foreign body;  Surgeon: Pamela Perez MD;  [facility]     ESOPHAGOSCOPY, GASTROSCOPY, DUODENOSCOPY (EGD), COMBINED N/A 6/9/2017    Procedure: COMBINED ESOPHAGOSCOPY, GASTROSCOPY, DUODENOSCOPY (EGD), REMOVE FOREIGN BODY;  Esophagoscopy, Gastroscopy, Duodenoscopy, Removal of Foreign Body;  Surgeon: Dejon Marsh MD;  [facility]     ESOPHAGOSCOPY, GASTROSCOPY, DUODENOSCOPY (EGD), COMBINED N/A 1/6/2018    Procedure: COMBINED ESOPHAGOSCOPY, GASTROSCOPY, DUODENOSCOPY (EGD), REMOVE FOREIGN BODY;  COMBINED ESOPHAGOSCOPY, GASTROSCOPY, DUODENOSCOPY (EGD) [by pascal net and snare with profol sedation;  Surgeon: Feliciano Emmanuel MD;  Location:  GI     ESOPHAGOSCOPY, GASTROSCOPY, DUODENOSCOPY (EGD), COMBINED N/A 3/19/2018    Procedure: COMBINED ESOPHAGOSCOPY, GASTROSCOPY, DUODENOSCOPY (EGD), REMOVE FOREIGN BODY;   Esophagodscopy, Gastroscopy, Duodenoscopy,Foreign Body Removal;  Surgeon: Brice Guzmán MD;   [facility]     ESOPHAGOSCOPY, GASTROSCOPY, DUODENOSCOPY (EGD), COMBINED N/A 4/16/2018    Procedure: COMBINED ESOPHAGOSCOPY, GASTROSCOPY, DUODENOSCOPY (EGD), REMOVE FOREIGN BODY;  Esophagogastroduodenoscopy  Foreign Body Removal X 2;  Surgeon: Royer Moise MD;  [facility]     ESOPHAGOSCOPY, GASTROSCOPY, DUODENOSCOPY (EGD), COMBINED N/A 6/1/2018    Procedure: COMBINED ESOPHAGOSCOPY, GASTROSCOPY, DUODENOSCOPY (EGD), REMOVE FOREIGN BODY;  COMBINED ESOPHAGOSCOPY, GASTROSCOPY, DUODENOSCOPY with removal of foreign body, propofol sedation by anesthesia;  Surgeon: Brice Martinez MD;  Location:  GI     ESOPHAGOSCOPY, GASTROSCOPY, DUODENOSCOPY (EGD), COMBINED N/A 7/25/2018    Procedure: COMBINED ESOPHAGOSCOPY, GASTROSCOPY, DUODENOSCOPY (EGD), REMOVE FOREIGN BODY;;  Surgeon: Candy Castelan MD;  Location:  GI     ESOPHAGOSCOPY, GASTROSCOPY, DUODENOSCOPY (EGD), COMBINED N/A 7/28/2018    Procedure: COMBINED ESOPHAGOSCOPY, GASTROSCOPY, DUODENOSCOPY (EGD), REMOVE FOREIGN BODY;  COMBINED ESOPHAGOSCOPY, GASTROSCOPY, DUODENOSCOPY (EGD), REMOVE FOREIGN BODY;  Surgeon: Brice Guzmán MD;  [facility]     ESOPHAGOSCOPY, GASTROSCOPY, DUODENOSCOPY (EGD), COMBINED N/A 7/31/2018    Procedure: COMBINED ESOPHAGOSCOPY, GASTROSCOPY, DUODENOSCOPY (EGD);  COMBINED ESOPHAGOSCOPY, GASTROSCOPY, DUODENOSCOPY (EGD) TO REMOVE FOREIGN BODY;  Surgeon: Lokesh Paula MD;  [facility]     ESOPHAGOSCOPY, GASTROSCOPY, DUODENOSCOPY (EGD), COMBINED N/A 8/4/2018    Procedure: COMBINED ESOPHAGOSCOPY, GASTROSCOPY, DUODENOSCOPY (EGD), REMOVE FOREIGN BODY;   combined esophagoscopy, gastroscopy, duodenoscopy, REMOVE FOREIGN BODY ;  Surgeon: Lokesh Paula MD;  [facility]     ESOPHAGOSCOPY, GASTROSCOPY, DUODENOSCOPY (EGD), COMBINED N/A 10/6/2019    Procedure: ESOPHAGOGASTRODUODENOSCOPY (EGD) with fireign body removal x2, esophageal stent placement with floroscopy;  Surgeon: Timoteo Espana MD;   [facility]     ESOPHAGOSCOPY, GASTROSCOPY, DUODENOSCOPY (EGD), COMBINED N/A 12/2/2019    Procedure: Esophagogastroduodenoscopy with esophageal stent removal, esophogram;  Surgeon: Kailee Tena MD;  [facility]     ESOPHAGOSCOPY, GASTROSCOPY, DUODENOSCOPY (EGD), COMBINED N/A 12/17/2019    Procedure: ESOPHAGOGASTRODUODENOSCOPY, WITH FOREIGN BODY REMOVAL;  Surgeon: Pamela Perez MD;  [facility]     ESOPHAGOSCOPY, GASTROSCOPY, DUODENOSCOPY (EGD), COMBINED N/A 12/13/2019    Procedure: ESOPHAGOGASTRODUODENOSCOPY, WITH FOREIGN BODY REMOVAL;  Surgeon: Samia Stanton MD;  [facility]     ESOPHAGOSCOPY, GASTROSCOPY, DUODENOSCOPY (EGD), COMBINED N/A 12/28/2019    Procedure: ESOPHAGOGASTRODUODENOSCOPY (EGD) Removal of Foreign Body X 2;  Surgeon: Huy Kelley MD;  [facility]     EXAM UNDER ANESTHESIA ANUS N/A 1/10/2017    Procedure: EXAM UNDER ANESTHESIA ANUS;  Surgeon: Annmarie Haynes MD;  [facility]     EXAM UNDER ANESTHESIA RECTUM N/A 7/19/2018    Procedure: EXAM UNDER ANESTHESIA RECTUM;  EXAM UNDER ANESTHESIA, REMOVAL OF RECTAL FOREIGN BODY;  Surgeon: Annmarie Haynes MD;  [facility]     HC REMOVE FECAL IMPACTION OR FB W ANESTHESIA N/A 12/18/2016    Procedure: REMOVE FECAL IMPACTION/FOREIGN BODY UNDER ANESTHESIA;  Surgeon: Soham Cano MD;  Location: [facility]     KNEE SURGERY - removed a small tissue mass from knee Right      LAPAROSCOPIC ABLATION ENDOMETRIOSIS       LAPAROTOMY EXPLORATORY N/A 1/10/2017    Procedure: LAPAROTOMY EXPLORATORY;  Surgeon: Annmarie Haynes MD;  [facility]     LAPAROTOMY EXPLORATORY  09/11/2019    Manual manipulation of bowel to remove pill bottle in rectum     lymph nodes removed from neck; benign  age 7     MAMMOPLASTY REDUCTION Bilateral      RELEASE CARPAL TUNNEL Bilateral      SIGMOIDOSCOPY FLEXIBLE N/A 1/10/2017    Procedure: SIGMOIDOSCOPY FLEXIBLE;  Surgeon: Annmarie Haynes  MD;  [facility]     SIGMOIDOSCOPY FLEXIBLE N/A 5/8/2018    Procedure: SIGMOIDOSCOPY FLEXIBLE;  flex sig with foreign body removal using snare and rattooth forcep;  Surgeon: Soham Cano MD;  Location:  GI     SIGMOIDOSCOPY FLEXIBLE N/A 2/20/2019    Procedure: Exam under anesthesia Colonoscopy with attempted  removal of rectal foreign body;  Surgeon: Estrada Chávez MD;  [facility]     Family History   Problem Relation Age of Onset     Diabetes Type 2  Maternal Grandmother      Diabetes Type 2  Paternal Grandmother      Breast Cancer Paternal Grandmother      Cerebrovascular Disease Father 53     Myocardial Infarction No family hx of      Coronary Artery Disease Early Onset No family hx of      Ovarian Cancer No family hx of      Colon Cancer No family hx of      Social History     Tobacco Use     Smoking status: Never Smoker     Smokeless tobacco: Never Used   Substance Use Topics     Alcohol use: No     Alcohol/week: 0.0 standard drinks     No current facility-administered medications for this encounter.      Current Outpatient Medications   Medication     acetaminophen (TYLENOL) 32 mg/mL liquid     albuterol (PROAIR HFA) 108 (90 Base) MCG/ACT inhaler     alum & mag hydroxide-simethicone (MYLANTA/MAALOX) 200-200-20 MG/5ML SUSP suspension     apixaban ANTICOAGULANT (ELIQUIS STARTER PACK) 5 MG tablet     busPIRone (BUSPAR) 10 MG tablet     calcium carbonate (TUMS) 500 MG chewable tablet     cloNIDine (CATAPRES) 0.1 MG tablet     desvenlafaxine (PRISTIQ) 100 MG 24 hr tablet     diphenhydrAMINE (BENADRYL) 25 MG capsule     docosanol (ABREVA) 10 % CREA cream     gabapentin (NEURONTIN) 600 MG tablet     hydrOXYzine (ATARAX) 25 MG tablet     levonorgestrel (MIRENA) 20 MCG/24HR IUD     lidocaine (XYLOCAINE) 2 % solution     lurasidone (LATUDA) 60 MG TABS tablet     metFORMIN (GLUCOPHAGE-XR) 500 MG 24 hr tablet     ondansetron (ZOFRAN-ODT) 8 MG ODT tab     oxyCODONE (ROXICODONE) 5 MG tablet     pantoprazole  (PROTONIX) 40 MG EC tablet     simethicone (MYLICON) 80 MG chewable tablet     sucralfate (CARAFATE) 1 GM/10ML suspension     topiramate (TOPAMAX) 100 MG tablet     vitamin D3 (CHOLECALCIFEROL) 2000 units (50 mcg) tablet     Allergies   Allergen Reactions     Amoxicillin-Pot Clavulanate Other (See Comments), Rash and Swelling     PN: facial swelling, left side. Also had cortisone injection the same day as she started the Augmentin.  PN: facial swelling, left side. Also had cortisone injection the same day as she started the Augmentin.  Noted in downtime recovery of chart.       Penicillins Anaphylaxis     Vancomycin Swelling, Itching and Rash     Other reaction(s): Redness  Flushed face, very itchy; HUT Comment: Flushed face, very itchy; HUT Reaction: Angioedema; HUT Reaction: Redness; HUT Severity: Med; HUT Noted: [id number]  Flushed face, very itchy  Flushed face, very itchy  Flushed face, very itchy       Hydrocodone Nausea and Vomiting and GI Disturbance     vomiting for days  vomiting for days  vomiting for days  vomiting for days, PN: vomiting for days  vomiting for days  vomiting for days  vomiting for days  vomiting for days  vomiting for days  vomiting for days, PN: vomiting for days  vomiting for days  vomiting for days  vomiting for days  vomiting for days  ; HUT Comment: vomiting for days; HUT Reaction: Gastrointestinal; HUT Reaction: Nausea And Vomiting; HUT Reaction Type: Intolerance; HUT Severity: Med; HUT Noted: [id number]  vomiting for days       Blood-Group Specific Substance Other (See Comments)     Patient has an anti-Cw and non-specific antibodies. Blood product orders may be delayed. Draw one red top and two purple top tubes for all type/screen/crossmatch orders.     Influenza Vaccines Other (See Comments)     Oseltamivir Hives     med stopped, PN: med stopped     Cephalosporins Rash     Lamotrigine Rash     Possibly associated with Lamictal.   HUT Comment: Possibly associated with Lamictal. ; HUT  "Reaction: Rash; [facility] Reaction Type: Allergy; [facility] Severity: Low; [facility] Noted: [id number]     Latex Rash     I have reviewed the Medications, Allergies, Past Medical and Surgical History, and Social History in the Epic system.    Review of Systems   Constitutional: Negative for fever.   Gastrointestinal: Positive for abdominal pain. Negative for nausea and vomiting.   Genitourinary: Positive for vaginal bleeding.   All other systems reviewed and are negative.      Physical Exam   BP: 135/80  Pulse: 115  Temp: 98.1  F (36.7  C)  Resp: 14  Height: 154.9 cm (5' 1\")  Weight: 108.9 kg (240 lb)  SpO2: 98 %      Physical Exam  Vitals signs and nursing note reviewed.   Constitutional:       General: She is not in acute distress.     Appearance: Normal appearance. She is well-developed. She is obese. She is not ill-appearing or diaphoretic.   HENT:      Head: Normocephalic and atraumatic.      Nose: Nose normal.      Mouth/Throat:      Mouth: Mucous membranes are moist.   Eyes:      General: No scleral icterus.     Conjunctiva/sclera: Conjunctivae normal.   Neck:      Musculoskeletal: Normal range of motion and neck supple. No neck rigidity.   Cardiovascular:      Rate and Rhythm: Normal rate.   Pulmonary:      Effort: Pulmonary effort is normal. No respiratory distress.      Breath sounds: No stridor.   Abdominal:      General: There is no distension.      Palpations: Abdomen is soft. There is no mass.      Tenderness: There is abdominal tenderness in the right lower quadrant. There is no guarding or rebound.      Hernia: No hernia is present.       Musculoskeletal: Normal range of motion.         General: No deformity or signs of injury.   Skin:     General: Skin is warm and dry.      Coloration: Skin is not jaundiced or pale.      Findings: No rash.   Neurological:      General: No focal deficit present.      Mental Status: She is alert and oriented to person, place, and time.   Psychiatric:         Mood and Affect: Mood normal. "         Behavior: Behavior normal.         Thought Content: Thought content normal.         ED Course   3:56 PM  The patient was seen and examined by Inessa Barlow MD, in [facility].      Procedures        Critical Care time:  none             Labs Ordered and Resulted from Time of ED Arrival Up to the Time of Departure from the ED   CBC WITH PLATELETS DIFFERENTIAL   CBC WITH PLATELETS DIFFERENTIAL   HCG QUAL URINE POCT            Assessments & Plan (with Medical Decision Making)   Nevin Alvarado is a 29 year old female past medical history for borderline personality disorder, depression, PTSD, anxiety, recurrent interventions for swallowed and rectal foreign bodies, chronic abdominal pain, PE (on apixaban) who presents to the ED for complaint of vaginal bleeding.    Ddx: menstrual bleeding, IUD migration, atrophic vaginitis      Patient minimally tender in low RLQ. No guarding. Has chronic ap. Requesting pain meds repeatedly. Informed patient that we will obtain US results before deciding what kind of pain medication is indicated. US resulted with prelim showing IUD in uterus. Possibly migrated slightly inferior. Reviewed with Gyn who recommended discharge with outpatient clinic follow up in a week. Patient reassured. Given gyn clinic info to schedule appointment. Return precautions provided.         Patient has no vaginal pain or     I have reviewed the nursing notes.    I have reviewed the findings, diagnosis, plan and need for follow up with the patient.    Discharge Medication List as of 1/1/2020  6:13 PM          Final diagnoses:   Vaginal bleeding   Pierce MURRAY, am serving as a trained medical scribe to document services personally performed by Inessa Barlow MD, based on the provider's statements to me.      Inessa MURRAY MD, was physically present and have reviewed and verified the accuracy of this note documented by Pierce Martin.    1/1/2020   [facility], [place], EMERGENCY DEPARTMENT     Inessa Barlow,  MD  01/01/20 1958

## 2020-01-02 NOTE — DISCHARGE INSTRUCTIONS
Please make an appointment to follow up with OB/Gyn--[facility] (phone: [phone]), OB/Gyn--[facility] (phone: [phone]), or OB/Gyn--[facility] (phone: [phone]) in 5-7 days.    You should not engage in sexual activity until cleared by gynecology.     Return to the ED if you develop worsening pain, vaginal bleeding (filling up more than 1 pad per hour), fever, fainting, lightheadedness, or shortness of breath.

## 2020-01-03 ENCOUNTER — APPOINTMENT (OUTPATIENT)
Dept: GENERAL RADIOLOGY | Facility: CLINIC | Age: 30
End: 2020-01-03
Attending: EMERGENCY MEDICINE
Payer: COMMERCIAL

## 2020-01-03 ENCOUNTER — ANESTHESIA EVENT (OUTPATIENT)
Dept: SURGERY | Facility: CLINIC | Age: 30
End: 2020-01-03
Payer: COMMERCIAL

## 2020-01-03 ENCOUNTER — ANESTHESIA (OUTPATIENT)
Dept: SURGERY | Facility: CLINIC | Age: 30
End: 2020-01-03
Payer: COMMERCIAL

## 2020-01-03 ENCOUNTER — HOSPITAL ENCOUNTER (OUTPATIENT)
Facility: CLINIC | Age: 30
Discharge: HOME | End: 2020-01-04
Attending: EMERGENCY MEDICINE | Admitting: INTERNAL MEDICINE
Payer: COMMERCIAL

## 2020-01-03 DIAGNOSIS — T18.9XXA SWALLOWED FOREIGN BODY, INITIAL ENCOUNTER: ICD-10-CM

## 2020-01-03 DIAGNOSIS — T18.2XXA FOREIGN BODY IN STOMACH, INITIAL ENCOUNTER: ICD-10-CM

## 2020-01-03 PROCEDURE — 37000009 ZZH ANESTHESIA TECHNICAL FEE, EACH ADDTL 15 MIN: Performed by: INTERNAL MEDICINE

## 2020-01-03 PROCEDURE — 25000125 ZZHC RX 250: Performed by: INTERNAL MEDICINE

## 2020-01-03 PROCEDURE — 25000125 ZZHC RX 250: Performed by: STUDENT IN AN ORGANIZED HEALTH CARE EDUCATION/TRAINING PROGRAM

## 2020-01-03 PROCEDURE — 74019 RADEX ABDOMEN 2 VIEWS: CPT

## 2020-01-03 PROCEDURE — 36000053 ZZH SURGERY LEVEL 2 EA 15 ADDTL MIN - UMMC: Performed by: INTERNAL MEDICINE

## 2020-01-03 PROCEDURE — 25800030 ZZH RX IP 258 OP 636: Performed by: STUDENT IN AN ORGANIZED HEALTH CARE EDUCATION/TRAINING PROGRAM

## 2020-01-03 PROCEDURE — 37000008 ZZH ANESTHESIA TECHNICAL FEE, 1ST 30 MIN: Performed by: INTERNAL MEDICINE

## 2020-01-03 PROCEDURE — 36000055 ZZH SURGERY LEVEL 2 W FLUORO 1ST 30 MIN - UMMC: Performed by: INTERNAL MEDICINE

## 2020-01-03 PROCEDURE — 99284 EMERGENCY DEPT VISIT MOD MDM: CPT | Mod: Z6 | Performed by: EMERGENCY MEDICINE

## 2020-01-03 PROCEDURE — 71000014 ZZH RECOVERY PHASE 1 LEVEL 2 FIRST HR: Performed by: INTERNAL MEDICINE

## 2020-01-03 PROCEDURE — 71000015 ZZH RECOVERY PHASE 1 LEVEL 2 EA ADDTL HR: Performed by: INTERNAL MEDICINE

## 2020-01-03 PROCEDURE — 25000566 ZZH SEVOFLURANE, EA 15 MIN: Performed by: INTERNAL MEDICINE

## 2020-01-03 PROCEDURE — 27210794 ZZH OR GENERAL SUPPLY STERILE: Performed by: INTERNAL MEDICINE

## 2020-01-03 PROCEDURE — 25000128 H RX IP 250 OP 636: Performed by: STUDENT IN AN ORGANIZED HEALTH CARE EDUCATION/TRAINING PROGRAM

## 2020-01-03 PROCEDURE — 99283 EMERGENCY DEPT VISIT LOW MDM: CPT | Mod: 25 | Performed by: EMERGENCY MEDICINE

## 2020-01-03 RX ORDER — SODIUM CHLORIDE, SODIUM LACTATE, POTASSIUM CHLORIDE, CALCIUM CHLORIDE 600; 310; 30; 20 MG/100ML; MG/100ML; MG/100ML; MG/100ML
INJECTION, SOLUTION INTRAVENOUS CONTINUOUS PRN
Status: DISCONTINUED | OUTPATIENT
Start: 2020-01-03 | End: 2020-01-04

## 2020-01-03 RX ORDER — DEXAMETHASONE SODIUM PHOSPHATE 4 MG/ML
INJECTION, SOLUTION INTRA-ARTICULAR; INTRALESIONAL; INTRAMUSCULAR; INTRAVENOUS; SOFT TISSUE PRN
Status: DISCONTINUED | OUTPATIENT
Start: 2020-01-03 | End: 2020-01-04

## 2020-01-03 RX ORDER — FENTANYL CITRATE 50 UG/ML
INJECTION, SOLUTION INTRAMUSCULAR; INTRAVENOUS PRN
Status: DISCONTINUED | OUTPATIENT
Start: 2020-01-03 | End: 2020-01-04

## 2020-01-03 RX ORDER — LIDOCAINE 40 MG/G
CREAM TOPICAL
Status: CANCELLED | OUTPATIENT
Start: 2020-01-03

## 2020-01-03 RX ORDER — LIDOCAINE HYDROCHLORIDE 20 MG/ML
INJECTION, SOLUTION INFILTRATION; PERINEURAL PRN
Status: DISCONTINUED | OUTPATIENT
Start: 2020-01-03 | End: 2020-01-04

## 2020-01-03 RX ORDER — PROPOFOL 10 MG/ML
INJECTION, EMULSION INTRAVENOUS PRN
Status: DISCONTINUED | OUTPATIENT
Start: 2020-01-03 | End: 2020-01-04

## 2020-01-03 RX ADMIN — LIDOCAINE HYDROCHLORIDE 100 MG: 20 INJECTION, SOLUTION INFILTRATION; PERINEURAL at 23:29

## 2020-01-03 RX ADMIN — ROCURONIUM BROMIDE 100 MG: 10 INJECTION INTRAVENOUS at 23:29

## 2020-01-03 RX ADMIN — FENTANYL CITRATE 50 MCG: 50 INJECTION, SOLUTION INTRAMUSCULAR; INTRAVENOUS at 23:58

## 2020-01-03 RX ADMIN — DEXAMETHASONE SODIUM PHOSPHATE 10 MG: 4 INJECTION, SOLUTION INTRA-ARTICULAR; INTRALESIONAL; INTRAMUSCULAR; INTRAVENOUS; SOFT TISSUE at 23:32

## 2020-01-03 RX ADMIN — SODIUM CHLORIDE, POTASSIUM CHLORIDE, SODIUM LACTATE AND CALCIUM CHLORIDE: 600; 310; 30; 20 INJECTION, SOLUTION INTRAVENOUS at 23:24

## 2020-01-03 RX ADMIN — MIDAZOLAM 2 MG: 1 INJECTION INTRAMUSCULAR; INTRAVENOUS at 23:26

## 2020-01-03 RX ADMIN — PROPOFOL 150 MG: 10 INJECTION, EMULSION INTRAVENOUS at 23:29

## 2020-01-03 RX ADMIN — FENTANYL CITRATE 100 MCG: 50 INJECTION, SOLUTION INTRAMUSCULAR; INTRAVENOUS at 23:29

## 2020-01-03 ASSESSMENT — MIFFLIN-ST. JEOR: SCORE: 1806.81

## 2020-01-03 NOTE — ED AVS SNAPSHOT
[facility], [place], Emergency Department  [address] [place] [id number]  Phone:  [phone]                                    Nevin Alvarado   MRN: [id number]    Department:  [facility], Emergency Department   Date of Visit:  1/3/2020           After Visit Summary Signature Page    I have received my discharge instructions, and my questions have been answered. I have discussed any challenges I see with this plan with the nurse or doctor.    ..........................................................................................................................................  Patient/Patient Representative Signature      ..........................................................................................................................................  Patient Representative Print Name and Relationship to Patient    ..................................................               ................................................  Date                                   Time    ..........................................................................................................................................  Reviewed by Signature/Title    ...................................................              ..............................................  Date                                               Time          22EPIC Rev 08/18

## 2020-01-04 VITALS
DIASTOLIC BLOOD PRESSURE: 87 MMHG | OXYGEN SATURATION: 94 % | TEMPERATURE: 98.5 F | RESPIRATION RATE: 14 BRPM | BODY MASS INDEX: 45.58 KG/M2 | HEIGHT: 62 IN | SYSTOLIC BLOOD PRESSURE: 145 MMHG | WEIGHT: 247.7 LBS

## 2020-01-04 LAB — UPPER GI ENDOSCOPY: NORMAL

## 2020-01-04 PROCEDURE — 25000128 H RX IP 250 OP 636: Performed by: EMERGENCY MEDICINE

## 2020-01-04 PROCEDURE — 25000128 H RX IP 250 OP 636: Performed by: STUDENT IN AN ORGANIZED HEALTH CARE EDUCATION/TRAINING PROGRAM

## 2020-01-04 PROCEDURE — 25000125 ZZHC RX 250: Performed by: STUDENT IN AN ORGANIZED HEALTH CARE EDUCATION/TRAINING PROGRAM

## 2020-01-04 PROCEDURE — 25000128 H RX IP 250 OP 636: Performed by: ANESTHESIOLOGY

## 2020-01-04 RX ORDER — NALOXONE HYDROCHLORIDE 0.4 MG/ML
.1-.4 INJECTION, SOLUTION INTRAMUSCULAR; INTRAVENOUS; SUBCUTANEOUS
Status: DISCONTINUED | OUTPATIENT
Start: 2020-01-04 | End: 2020-01-04 | Stop reason: HOSPADM

## 2020-01-04 RX ORDER — ONDANSETRON 4 MG/1
4 TABLET, ORALLY DISINTEGRATING ORAL EVERY 30 MIN PRN
Status: DISCONTINUED | OUTPATIENT
Start: 2020-01-04 | End: 2020-01-04 | Stop reason: HOSPADM

## 2020-01-04 RX ORDER — DIPHENHYDRAMINE HYDROCHLORIDE 50 MG/ML
25 INJECTION INTRAMUSCULAR; INTRAVENOUS EVERY 6 HOURS PRN
Status: DISCONTINUED | OUTPATIENT
Start: 2020-01-04 | End: 2020-01-04 | Stop reason: HOSPADM

## 2020-01-04 RX ORDER — HYDROMORPHONE HYDROCHLORIDE 1 MG/ML
.3-.5 INJECTION, SOLUTION INTRAMUSCULAR; INTRAVENOUS; SUBCUTANEOUS EVERY 5 MIN PRN
Status: DISCONTINUED | OUTPATIENT
Start: 2020-01-04 | End: 2020-01-04 | Stop reason: HOSPADM

## 2020-01-04 RX ORDER — DIPHENHYDRAMINE HYDROCHLORIDE 50 MG/ML
25 INJECTION INTRAMUSCULAR; INTRAVENOUS ONCE
Status: COMPLETED | OUTPATIENT
Start: 2020-01-04 | End: 2020-01-04

## 2020-01-04 RX ORDER — LABETALOL 20 MG/4 ML (5 MG/ML) INTRAVENOUS SYRINGE
10
Status: DISCONTINUED | OUTPATIENT
Start: 2020-01-04 | End: 2020-01-04 | Stop reason: HOSPADM

## 2020-01-04 RX ORDER — ONDANSETRON 2 MG/ML
4 INJECTION INTRAMUSCULAR; INTRAVENOUS EVERY 30 MIN PRN
Status: DISCONTINUED | OUTPATIENT
Start: 2020-01-04 | End: 2020-01-04 | Stop reason: HOSPADM

## 2020-01-04 RX ORDER — HEPARIN SODIUM (PORCINE) LOCK FLUSH IV SOLN 100 UNIT/ML 100 UNIT/ML
5 SOLUTION INTRAVENOUS
Status: DISCONTINUED | OUTPATIENT
Start: 2020-01-04 | End: 2020-01-04 | Stop reason: HOSPADM

## 2020-01-04 RX ORDER — ONDANSETRON 2 MG/ML
INJECTION INTRAMUSCULAR; INTRAVENOUS PRN
Status: DISCONTINUED | OUTPATIENT
Start: 2020-01-04 | End: 2020-01-04

## 2020-01-04 RX ORDER — SODIUM CHLORIDE, SODIUM LACTATE, POTASSIUM CHLORIDE, CALCIUM CHLORIDE 600; 310; 30; 20 MG/100ML; MG/100ML; MG/100ML; MG/100ML
INJECTION, SOLUTION INTRAVENOUS CONTINUOUS
Status: DISCONTINUED | OUTPATIENT
Start: 2020-01-04 | End: 2020-01-04 | Stop reason: HOSPADM

## 2020-01-04 RX ORDER — FENTANYL CITRATE 50 UG/ML
25-50 INJECTION, SOLUTION INTRAMUSCULAR; INTRAVENOUS
Status: DISCONTINUED | OUTPATIENT
Start: 2020-01-04 | End: 2020-01-04 | Stop reason: HOSPADM

## 2020-01-04 RX ORDER — NALOXONE HYDROCHLORIDE 0.4 MG/ML
.1-.4 INJECTION, SOLUTION INTRAMUSCULAR; INTRAVENOUS; SUBCUTANEOUS
Status: CANCELLED | OUTPATIENT
Start: 2020-01-04 | End: 2020-01-05

## 2020-01-04 RX ORDER — FLUMAZENIL 0.1 MG/ML
0.2 INJECTION, SOLUTION INTRAVENOUS
Status: CANCELLED | OUTPATIENT
Start: 2020-01-04 | End: 2020-01-04

## 2020-01-04 RX ADMIN — FENTANYL CITRATE 25 MCG: 50 INJECTION, SOLUTION INTRAMUSCULAR; INTRAVENOUS at 02:00

## 2020-01-04 RX ADMIN — FENTANYL CITRATE 25 MCG: 50 INJECTION, SOLUTION INTRAMUSCULAR; INTRAVENOUS at 01:54

## 2020-01-04 RX ADMIN — DIPHENHYDRAMINE HYDROCHLORIDE 25 MG: 50 INJECTION, SOLUTION INTRAMUSCULAR; INTRAVENOUS at 02:24

## 2020-01-04 RX ADMIN — HYDROMORPHONE HYDROCHLORIDE 0.5 MG: 1 INJECTION, SOLUTION INTRAMUSCULAR; INTRAVENOUS; SUBCUTANEOUS at 02:37

## 2020-01-04 RX ADMIN — HYDROMORPHONE HYDROCHLORIDE 0.5 MG: 1 INJECTION, SOLUTION INTRAMUSCULAR; INTRAVENOUS; SUBCUTANEOUS at 02:31

## 2020-01-04 RX ADMIN — HYDROMORPHONE HYDROCHLORIDE 0.5 MG: 1 INJECTION, SOLUTION INTRAMUSCULAR; INTRAVENOUS; SUBCUTANEOUS at 02:14

## 2020-01-04 RX ADMIN — FENTANYL CITRATE 50 MCG: 50 INJECTION, SOLUTION INTRAMUSCULAR; INTRAVENOUS at 00:46

## 2020-01-04 RX ADMIN — FENTANYL CITRATE 25 MCG: 50 INJECTION, SOLUTION INTRAMUSCULAR; INTRAVENOUS at 01:50

## 2020-01-04 RX ADMIN — FENTANYL CITRATE 50 MCG: 50 INJECTION, SOLUTION INTRAMUSCULAR; INTRAVENOUS at 01:38

## 2020-01-04 RX ADMIN — FENTANYL CITRATE 25 MCG: 50 INJECTION, SOLUTION INTRAMUSCULAR; INTRAVENOUS at 02:05

## 2020-01-04 RX ADMIN — ONDANSETRON 4 MG: 2 INJECTION INTRAMUSCULAR; INTRAVENOUS at 01:29

## 2020-01-04 RX ADMIN — SUGAMMADEX 200 MG: 100 INJECTION, SOLUTION INTRAVENOUS at 01:31

## 2020-01-04 RX ADMIN — HYDROMORPHONE HYDROCHLORIDE 0.5 MG: 1 INJECTION, SOLUTION INTRAMUSCULAR; INTRAVENOUS; SUBCUTANEOUS at 02:21

## 2020-01-04 RX ADMIN — DIPHENHYDRAMINE HYDROCHLORIDE 25 MG: 50 INJECTION, SOLUTION INTRAMUSCULAR; INTRAVENOUS at 02:40

## 2020-01-04 RX ADMIN — HEPARIN 5 ML: 100 SYRINGE at 03:40

## 2020-01-04 ASSESSMENT — PAIN DESCRIPTION - DESCRIPTORS: DESCRIPTORS: SHARP

## 2020-01-04 NOTE — ANESTHESIA PREPROCEDURE EVALUATION
Anesthesia Pre-Procedure Evaluation    Patient: Nevin Alvarado   MRN:     [id number] Gender:   female   Age:    29 year old :      1990        Preoperative Diagnosis: * No pre-op diagnosis entered *   Procedure(s):  ESOPHAGOGASTRODUODENOSCOPY (EGD)     Past Medical History:   Diagnosis Date     ADD (attention deficit disorder)      Anorexia nervosa with bulimia     history of; on Topamax     Anxiety      Borderline personality disorder (H)      Depression      Depressive disorder      H/O self-harm      Lives in independent group home     due to debilitating mental illness     Migraine without aura     no known triggers; on Topamax bid and Imitrex PRN     Morbid obesity (H)      PTSD (post-traumatic stress disorder)      Rectal foreign body - Recurrent issue, self placed      Swallowed foreign body - Recurrent issue, self placed       Past Surgical History:   Procedure Laterality Date     COMBINED ESOPHAGOSCOPY, GASTROSCOPY, DUODENOSCOPY (EGD), REPLACE ESOPHAGEAL STENT N/A 10/9/2019    Procedure: Upper Endoscopy with Suture Placement;  Surgeon: Zurdo Ramirez MD;  Location: [facility]     ESOPHAGOSCOPY, GASTROSCOPY, DUODENOSCOPY (EGD), COMBINED N/A 3/9/2017    Procedure: COMBINED ESOPHAGOSCOPY, GASTROSCOPY, DUODENOSCOPY (EGD), REMOVE FOREIGN BODY;  Surgeon: Avis Guzmán MD;  Location: [facility]     ESOPHAGOSCOPY, GASTROSCOPY, DUODENOSCOPY (EGD), COMBINED N/A 2017    Procedure: COMBINED ESOPHAGOSCOPY, GASTROSCOPY, DUODENOSCOPY (EGD), REMOVE FOREIGN BODY;  EGD removal Foregin body;  Surgeon: Lokesh Paula MD;  Location: [facility]     ESOPHAGOSCOPY, GASTROSCOPY, DUODENOSCOPY (EGD), COMBINED N/A 2017    Procedure: COMBINED ESOPHAGOSCOPY, GASTROSCOPY, DUODENOSCOPY (EGD);  COMBINED ESOPHAGOSCOPY, GASTROSCOPY, DUODENOSCOPY (EGD) [[id number]]attempted removal of foreign body;  Surgeon: Pamela Perez MD;  Location: [facility]     ESOPHAGOSCOPY, GASTROSCOPY, DUODENOSCOPY  (EGD), COMBINED N/A 6/9/2017    Procedure: COMBINED ESOPHAGOSCOPY, GASTROSCOPY, DUODENOSCOPY (EGD), REMOVE FOREIGN BODY;  Esophagoscopy, Gastroscopy, Duodenoscopy, Removal of Foreign Body;  Surgeon: Dejon Marsh MD;  Location: [facility]     ESOPHAGOSCOPY, GASTROSCOPY, DUODENOSCOPY (EGD), COMBINED N/A 1/6/2018    Procedure: COMBINED ESOPHAGOSCOPY, GASTROSCOPY, DUODENOSCOPY (EGD), REMOVE FOREIGN BODY;  COMBINED ESOPHAGOSCOPY, GASTROSCOPY, DUODENOSCOPY (EGD) [by pascal net and snare with profol sedation;  Surgeon: Feliciano Emmanuel MD;  Location:  [facility]     ESOPHAGOSCOPY, GASTROSCOPY, DUODENOSCOPY (EGD), COMBINED N/A 3/19/2018    Procedure: COMBINED ESOPHAGOSCOPY, GASTROSCOPY, DUODENOSCOPY (EGD), REMOVE FOREIGN BODY;   Esophagodscopy, Gastroscopy, Duodenoscopy,Foreign Body Removal;  Surgeon: Brice Guzmán MD;  Location: [facility]     ESOPHAGOSCOPY, GASTROSCOPY, DUODENOSCOPY (EGD), COMBINED N/A 4/16/2018    Procedure: COMBINED ESOPHAGOSCOPY, GASTROSCOPY, DUODENOSCOPY (EGD), REMOVE FOREIGN BODY;  Esophagogastroduodenoscopy  Foreign Body Removal X 2;  Surgeon: Royer Moise MD;  Location: [facility]     ESOPHAGOSCOPY, GASTROSCOPY, DUODENOSCOPY (EGD), COMBINED N/A 6/1/2018    Procedure: COMBINED ESOPHAGOSCOPY, GASTROSCOPY, DUODENOSCOPY (EGD), REMOVE FOREIGN BODY;  COMBINED ESOPHAGOSCOPY, GASTROSCOPY, DUODENOSCOPY with removal of foreign body, propofol sedation by anesthesia;  Surgeon: Brice Martinez MD;  Location:  GI     ESOPHAGOSCOPY, GASTROSCOPY, DUODENOSCOPY (EGD), COMBINED N/A 7/25/2018    Procedure: COMBINED ESOPHAGOSCOPY, GASTROSCOPY, DUODENOSCOPY (EGD), REMOVE FOREIGN BODY;;  Surgeon: Candy Castelan MD;  Location:  GI     ESOPHAGOSCOPY, GASTROSCOPY, DUODENOSCOPY (EGD), COMBINED N/A 7/28/2018    Procedure: COMBINED ESOPHAGOSCOPY, GASTROSCOPY, DUODENOSCOPY (EGD), REMOVE FOREIGN BODY;  COMBINED ESOPHAGOSCOPY, GASTROSCOPY, DUODENOSCOPY (EGD), REMOVE FOREIGN BODY;  Surgeon: Ramirez  MD Brice;  Location: [facility]     ESOPHAGOSCOPY, GASTROSCOPY, DUODENOSCOPY (EGD), COMBINED N/A 7/31/2018    Procedure: COMBINED ESOPHAGOSCOPY, GASTROSCOPY, DUODENOSCOPY (EGD);  COMBINED ESOPHAGOSCOPY, GASTROSCOPY, DUODENOSCOPY (EGD) TO REMOVE FOREIGN BODY;  Surgeon: Lokesh Paula MD;  Location: [facility]     ESOPHAGOSCOPY, GASTROSCOPY, DUODENOSCOPY (EGD), COMBINED N/A 8/4/2018    Procedure: COMBINED ESOPHAGOSCOPY, GASTROSCOPY, DUODENOSCOPY (EGD), REMOVE FOREIGN BODY;   combined esophagoscopy, gastroscopy, duodenoscopy, REMOVE FOREIGN BODY ;  Surgeon: Lokesh Paula MD;  Location: [facility]     ESOPHAGOSCOPY, GASTROSCOPY, DUODENOSCOPY (EGD), COMBINED N/A 10/6/2019    Procedure: ESOPHAGOGASTRODUODENOSCOPY (EGD) with fireign body removal x2, esophageal stent placement with floroscopy;  Surgeon: Timoteo Espana MD;  Location: [facility]     ESOPHAGOSCOPY, GASTROSCOPY, DUODENOSCOPY (EGD), COMBINED N/A 12/2/2019    Procedure: Esophagogastroduodenoscopy with esophageal stent removal, esophogram;  Surgeon: Kailee Tena MD;  Location: [facility]     ESOPHAGOSCOPY, GASTROSCOPY, DUODENOSCOPY (EGD), COMBINED N/A 12/17/2019    Procedure: ESOPHAGOGASTRODUODENOSCOPY, WITH FOREIGN BODY REMOVAL;  Surgeon: Pamela Perez MD;  [facility]: [facility]     ESOPHAGOSCOPY, GASTROSCOPY, DUODENOSCOPY (EGD), COMBINED N/A 12/13/2019    Procedure: ESOPHAGOGASTRODUODENOSCOPY, WITH FOREIGN BODY REMOVAL;  Surgeon: Samia Stanton MD;  Location: [facility]     ESOPHAGOSCOPY, GASTROSCOPY, DUODENOSCOPY (EGD), COMBINED N/A 12/28/2019    Procedure: ESOPHAGOGASTRODUODENOSCOPY (EGD) Removal of Foreign Body X 2;  Surgeon: Huy Kelley MD;  [facility]     EXAM UNDER ANESTHESIA ANUS N/A 1/10/2017    Procedure: EXAM UNDER ANESTHESIA ANUS;  Surgeon: Annmarie Haynes MD;  Location: [facility]     EXAM UNDER ANESTHESIA RECTUM N/A 7/19/2018    Procedure: EXAM UNDER ANESTHESIA RECTUM;  EXAM UNDER ANESTHESIA, REMOVAL OF RECTAL FOREIGN  BODY;  Surgeon: Annmarie Haynes MD;  Location: [facility]     HC REMOVE FECAL IMPACTION OR FB W ANESTHESIA N/A 12/18/2016    Procedure: REMOVE FECAL IMPACTION/FOREIGN BODY UNDER ANESTHESIA;  Surgeon: Soham Cano MD;  Location: [facility]     KNEE SURGERY - removed a small tissue mass from knee Right      LAPAROSCOPIC ABLATION ENDOMETRIOSIS       LAPAROTOMY EXPLORATORY N/A 1/10/2017    Procedure: LAPAROTOMY EXPLORATORY;  Surgeon: Annmarie Haynes MD;  Location: [facility]     LAPAROTOMY EXPLORATORY  09/11/2019    Manual manipulation of bowel to remove pill bottle in rectum     lymph nodes removed from neck; benign  age 7     MAMMOPLASTY REDUCTION Bilateral      RELEASE CARPAL TUNNEL Bilateral      SIGMOIDOSCOPY FLEXIBLE N/A 1/10/2017    Procedure: SIGMOIDOSCOPY FLEXIBLE;  Surgeon: Annmarie Haynes MD;  Location: [facility]     SIGMOIDOSCOPY FLEXIBLE N/A 5/8/2018    Procedure: SIGMOIDOSCOPY FLEXIBLE;  flex sig with foreign body removal using snare and rattooth forcep;  Surgeon: Soham Cano MD;  Location:  GI     SIGMOIDOSCOPY FLEXIBLE N/A 2/20/2019    Procedure: Exam under anesthesia Colonoscopy with attempted  removal of rectal foreign body;  Surgeon: Estrada Chávez MD;  Location: [facility]          Anesthesia Evaluation     . Pt has had prior anesthetic. Type: General and MAC    No history of anesthetic complications          ROS/MED HX    ENT/Pulmonary:  - neg pulmonary ROS   (+)ZOHRA risk factors obese, , . .    Neurologic:  - neg neurologic ROS     Cardiovascular:     (+) ----. : . . fainting (syncope). :. .       METS/Exercise Tolerance:  >4 METS   Hematologic: Comments: Anticoagulation held >12h - neg hematologic  ROS   (+) History of blood clots pt is anticoagulated, -      Musculoskeletal:  - neg musculoskeletal ROS       GI/Hepatic: Comment: S/p multiple foreign body ingestions and rectal insertions resulting in esophogeal perforation, stenting 10/9/2019, stricture, pain  Stent removed  12/03 12/19 new esophageal perf from attempted EGD removal of samuel ruffin  1/3/20 returning to OR after swallowing pen spring        Renal/Genitourinary:  - ROS Renal section negative       Endo:  - neg endo ROS   (+) Obesity, .      Psychiatric: Comment: PTSD, Intermittent suicidal ideation      (+) psychiatric history anxiety, depression and bipolar      Infectious Disease:  - neg infectious disease ROS       Malignancy:      - no malignancy   Other:    (+) No chance of pregnancy C-spine cleared: N/A, no H/O Chronic Pain,no other significant disability   - neg other ROS                     PHYSICAL EXAM:   Mental Status/Neuro: A/A/O   Airway:   Mallampati: III  Mouth/Opening: Full  TM distance: > 6 cm  Neck ROM: Full   Respiratory: Auscultation: CTAB     Resp. Rate: Normal     Resp. Effort: Normal      CV: Rhythm: Regular  Rate: Age appropriate  Heart: Normal Sounds  Edema: None   Comments:      Dental: Normal Dentition                LABS:  CBC:   Lab Results   Component Value Date    WBC 9.5 01/01/2020    WBC 10.0 12/28/2019    HGB 11.9 01/01/2020    HGB 11.6 (L) 12/28/2019    HCT 36.5 01/01/2020    HCT 36.4 12/28/2019     01/01/2020     12/28/2019     BMP:   Lab Results   Component Value Date     12/28/2019     12/17/2019    POTASSIUM 3.6 12/28/2019    POTASSIUM 3.6 12/17/2019    CHLORIDE 114 (H) 12/28/2019    CHLORIDE 112 (H) 12/17/2019    CO2 24 12/28/2019    CO2 25 12/17/2019    BUN 6 (L) 12/28/2019    BUN 10 12/17/2019    CR 0.48 (L) 12/28/2019    CR 0.53 12/17/2019     (H) 12/28/2019    GLC 94 12/17/2019     COAGS:   Lab Results   Component Value Date    PTT 51 (H) 12/28/2019    INR 1.42 (H) 12/28/2019     POC:   Lab Results   Component Value Date    BGM 99 11/03/2019    HCG Negative 12/02/2019    HCGS Negative 11/28/2019     OTHER:   Lab Results   Component Value Date    LACT 1.0 11/28/2019    KELBY 8.5 12/28/2019    PHOS 3.5 12/01/2019    MAG 1.9 12/05/2019     "ALBUMIN 3.1 (L) 12/09/2019    PROTTOTAL 6.2 (L) 12/09/2019    ALT 24 12/09/2019    AST 12 12/09/2019    ALKPHOS 73 12/09/2019    BILITOTAL 0.2 12/09/2019    LIPASE 40 (L) 12/04/2019    TSH 0.66 03/21/2018    T4 0.96 05/16/2017    CRP 6.6 12/09/2019    SED 26 (H) 07/11/2017        Preop Vitals    BP Readings from Last 3 Encounters:   01/03/20 (!) 151/78   01/01/20 127/77   12/28/19 (!) 128/90    Pulse Readings from Last 3 Encounters:   01/01/20 80   12/28/19 96   12/18/19 96      Resp Readings from Last 3 Encounters:   01/03/20 20   01/01/20 12   12/28/19 16    SpO2 Readings from Last 3 Encounters:   01/03/20 98%   01/01/20 98%   12/28/19 99%      Temp Readings from Last 1 Encounters:   01/03/20 36.8  C (98.2  F) (Oral)    Ht Readings from Last 1 Encounters:   01/03/20 1.575 m (5' 2\")      Wt Readings from Last 1 Encounters:   01/03/20 112.4 kg (247 lb 11.2 oz)    Estimated body mass index is 45.3 kg/m  as calculated from the following:    Height as of this encounter: 1.575 m (5' 2\").    Weight as of this encounter: 112.4 kg (247 lb 11.2 oz).     LDA:  Port A Cath Single 12/17/19 Right Chest wall (Active)   Number of days: 17        Assessment:   ASA SCORE: 3    H&P: History and physical reviewed and following examination; no interval change.   Smoking Status:  Non-Smoker/Unknown   NPO Status: ELEVATED Aspiration Risk/Unknown     Plan:   Anes. Type:  General   Pre-Medication: Midazolam   Induction:  IV (RSI)   Airway: ETT; Oral   Access/Monitoring: PIV   Maintenance: Balanced     Postop Plan:   Postop Pain: Opioids  Postop Sedation/Airway: Not planned  Disposition: Inpatient/Admit     PONV Management:   Adult Risk Factors: Female, Non-Smoker, Postop Opioids   Prevention: Ondansetron, Dexamethasone     CONSENT: Deferred (Emergency)   Plan and risks discussed with: Not Applicable   Blood Products: N/a                         "

## 2020-01-04 NOTE — ED PROVIDER NOTES
[place] EMERGENCY DEPARTMENT ([facility])  January 3, 2020    History     Chief Complaint   Patient presents with     Swallowed Foreign Body     HPI  Nevin Alvarado is a 29 year old female with history notable for several ED visits, recurrent surgical interventions for swallowed and rectal foreign bodies, PE (on Elqiuis), chronic abdominal/pelvic pain, borderline personality disorder, depression, and anxiety who presents to the ED for swallowed foreign body.  Patient states that 3 hours prior to arrival, she was cleaning and swallowed a pen spring that was lying around.  She states that this pen spring came from a pen that she previously took apart.  Patient states she currently lives in an apartment and plans on seeing a therapist, psychiatrist, and behavioral analyst.  She states for her that the swallowing of foreign objects is she has been doing it for years.  She states she has previously swallowed Alberto pins, pencil Nucla, and paperclips.  She states she last ate food at around 6:30 PM.  She is now complaining of LUQ abdominal pain.      PAST MEDICAL HISTORY  Past Medical History:   Diagnosis Date     ADD (attention deficit disorder)      Anorexia nervosa with bulimia     history of; on Topamax     Anxiety      Borderline personality disorder (H)      Depression      Depressive disorder      H/O self-harm      Lives in independent group home     due to debilitating mental illness     Migraine without aura     no known triggers; on Topamax bid and Imitrex PRN     Morbid obesity (H)      PTSD (post-traumatic stress disorder)      Rectal foreign body - Recurrent issue, self placed      Swallowed foreign body - Recurrent issue, self placed      PAST SURGICAL HISTORY  Past Surgical History:   Procedure Laterality Date     COMBINED ESOPHAGOSCOPY, GASTROSCOPY, DUODENOSCOPY (EGD), REPLACE ESOPHAGEAL STENT N/A 10/9/2019    Procedure: Upper Endoscopy with Suture Placement;  Surgeon: Ashley  Zurdo Rolle MD;  [facility]: [facility]     ESOPHAGOSCOPY, GASTROSCOPY, DUODENOSCOPY (EGD), COMBINED N/A 3/9/2017    Procedure: COMBINED ESOPHAGOSCOPY, GASTROSCOPY, DUODENOSCOPY (EGD), REMOVE FOREIGN BODY;  Surgeon: Avis Guzmán MD;  [facility]: [facility]     ESOPHAGOSCOPY, GASTROSCOPY, DUODENOSCOPY (EGD), COMBINED N/A 4/20/2017    Procedure: COMBINED ESOPHAGOSCOPY, GASTROSCOPY, DUODENOSCOPY (EGD), REMOVE FOREIGN BODY;  EGD removal Foregin body;  Surgeon: Lokesh Paula MD;  [facility]     ESOPHAGOSCOPY, GASTROSCOPY, DUODENOSCOPY (EGD), COMBINED N/A 6/12/2017    Procedure: COMBINED ESOPHAGOSCOPY, GASTROSCOPY, DUODENOSCOPY (EGD);  COMBINED ESOPHAGOSCOPY, GASTROSCOPY, DUODENOSCOPY (EGD) [[id number]]attempted removal of foreign body;  Surgeon: Pamela Perez MD;  [facility]     ESOPHAGOSCOPY, GASTROSCOPY, DUODENOSCOPY (EGD), COMBINED N/A 6/9/2017    Procedure: COMBINED ESOPHAGOSCOPY, GASTROSCOPY, DUODENOSCOPY (EGD), REMOVE FOREIGN BODY;  Esophagoscopy, Gastroscopy, Duodenoscopy, Removal of Foreign Body;  Surgeon: Dejon Marsh MD;  [facility]     ESOPHAGOSCOPY, GASTROSCOPY, DUODENOSCOPY (EGD), COMBINED N/A 1/6/2018    Procedure: COMBINED ESOPHAGOSCOPY, GASTROSCOPY, DUODENOSCOPY (EGD), REMOVE FOREIGN BODY;  COMBINED ESOPHAGOSCOPY, GASTROSCOPY, DUODENOSCOPY (EGD) [by pascal net and snare with profol sedation;  Surgeon: Feliciano Emmanuel MD;  Location:  GI     ESOPHAGOSCOPY, GASTROSCOPY, DUODENOSCOPY (EGD), COMBINED N/A 3/19/2018    Procedure: COMBINED ESOPHAGOSCOPY, GASTROSCOPY, DUODENOSCOPY (EGD), REMOVE FOREIGN BODY;   Esophagodscopy, Gastroscopy, Duodenoscopy,Foreign Body Removal;  Surgeon: Brice Guzmán MD;  [facility]     ESOPHAGOSCOPY, GASTROSCOPY, DUODENOSCOPY (EGD), COMBINED N/A 4/16/2018    Procedure: COMBINED ESOPHAGOSCOPY, GASTROSCOPY, DUODENOSCOPY (EGD), REMOVE FOREIGN BODY;  Esophagogastroduodenoscopy  Foreign Body Removal X 2;  Surgeon: Jose Maria  Royer Zhou MD;  [facility]     ESOPHAGOSCOPY, GASTROSCOPY, DUODENOSCOPY (EGD), COMBINED N/A 6/1/2018    Procedure: COMBINED ESOPHAGOSCOPY, GASTROSCOPY, DUODENOSCOPY (EGD), REMOVE FOREIGN BODY;  COMBINED ESOPHAGOSCOPY, GASTROSCOPY, DUODENOSCOPY with removal of foreign body, propofol sedation by anesthesia;  Surgeon: Brice Martinez MD;  Location:  GI     ESOPHAGOSCOPY, GASTROSCOPY, DUODENOSCOPY (EGD), COMBINED N/A 7/25/2018    Procedure: COMBINED ESOPHAGOSCOPY, GASTROSCOPY, DUODENOSCOPY (EGD), REMOVE FOREIGN BODY;;  Surgeon: Candy Castelan MD;  Location:  GI     ESOPHAGOSCOPY, GASTROSCOPY, DUODENOSCOPY (EGD), COMBINED N/A 7/28/2018    Procedure: COMBINED ESOPHAGOSCOPY, GASTROSCOPY, DUODENOSCOPY (EGD), REMOVE FOREIGN BODY;  COMBINED ESOPHAGOSCOPY, GASTROSCOPY, DUODENOSCOPY (EGD), REMOVE FOREIGN BODY;  Surgeon: Brice Guzmán MD;  [facility]     ESOPHAGOSCOPY, GASTROSCOPY, DUODENOSCOPY (EGD), COMBINED N/A 7/31/2018    Procedure: COMBINED ESOPHAGOSCOPY, GASTROSCOPY, DUODENOSCOPY (EGD);  COMBINED ESOPHAGOSCOPY, GASTROSCOPY, DUODENOSCOPY (EGD) TO REMOVE FOREIGN BODY;  Surgeon: Lokesh Paula MD;  [facility]     ESOPHAGOSCOPY, GASTROSCOPY, DUODENOSCOPY (EGD), COMBINED N/A 8/4/2018    Procedure: COMBINED ESOPHAGOSCOPY, GASTROSCOPY, DUODENOSCOPY (EGD), REMOVE FOREIGN BODY;   combined esophagoscopy, gastroscopy, duodenoscopy, REMOVE FOREIGN BODY ;  Surgeon: Lokesh Paula MD;  [facility]     ESOPHAGOSCOPY, GASTROSCOPY, DUODENOSCOPY (EGD), COMBINED N/A 10/6/2019    Procedure: ESOPHAGOGASTRODUODENOSCOPY (EGD) with fireign body removal x2, esophageal stent placement with floroscopy;  Surgeon: Timoteo Espana MD;  [facility]     ESOPHAGOSCOPY, GASTROSCOPY, DUODENOSCOPY (EGD), COMBINED N/A 12/2/2019    Procedure: Esophagogastroduodenoscopy with esophageal stent removal, esophogram;  Surgeon: Kailee Tena MD;  [facility]     ESOPHAGOSCOPY,  GASTROSCOPY, DUODENOSCOPY (EGD), COMBINED N/A 12/17/2019    Procedure: ESOPHAGOGASTRODUODENOSCOPY, WITH FOREIGN BODY REMOVAL;  Surgeon: Pamela Perez MD;  [facility]     ESOPHAGOSCOPY, GASTROSCOPY, DUODENOSCOPY (EGD), COMBINED N/A 12/13/2019    Procedure: ESOPHAGOGASTRODUODENOSCOPY, WITH FOREIGN BODY REMOVAL;  Surgeon: Samia Stanton MD;  [facility]     ESOPHAGOSCOPY, GASTROSCOPY, DUODENOSCOPY (EGD), COMBINED N/A 12/28/2019    Procedure: ESOPHAGOGASTRODUODENOSCOPY (EGD) Removal of Foreign Body X 2;  Surgeon: Huy Kelley MD;  [facility]     EXAM UNDER ANESTHESIA ANUS N/A 1/10/2017    Procedure: EXAM UNDER ANESTHESIA ANUS;  Surgeon: Annmarie Haynes MD;  [facility]     EXAM UNDER ANESTHESIA RECTUM N/A 7/19/2018    Procedure: EXAM UNDER ANESTHESIA RECTUM;  EXAM UNDER ANESTHESIA, REMOVAL OF RECTAL FOREIGN BODY;  Surgeon: Annmarie Haynes MD;  [facility]     HC REMOVE FECAL IMPACTION OR FB W ANESTHESIA N/A 12/18/2016    Procedure: REMOVE FECAL IMPACTION/FOREIGN BODY UNDER ANESTHESIA;  Surgeon: Soham Cano MD;  Location:  OR     KNEE SURGERY - removed a small tissue mass from knee Right      LAPAROSCOPIC ABLATION ENDOMETRIOSIS       LAPAROTOMY EXPLORATORY N/A 1/10/2017    Procedure: LAPAROTOMY EXPLORATORY;  Surgeon: Annmarie Haynes MD;  [facility]: [facility]     LAPAROTOMY EXPLORATORY  09/11/2019    Manual manipulation of bowel to remove pill bottle in rectum     lymph nodes removed from neck; benign  age 7     MAMMOPLASTY REDUCTION Bilateral      RELEASE CARPAL TUNNEL Bilateral      SIGMOIDOSCOPY FLEXIBLE N/A 1/10/2017    Procedure: SIGMOIDOSCOPY FLEXIBLE;  Surgeon: Annmarie Haynes MD;  [facility]     SIGMOIDOSCOPY FLEXIBLE N/A 5/8/2018    Procedure: SIGMOIDOSCOPY FLEXIBLE;  flex sig with foreign body removal using snare and rattooth forcep;  Surgeon: Soham Cano MD;  Location:  GI     SIGMOIDOSCOPY FLEXIBLE N/A  2/20/2019    Procedure: Exam under anesthesia Colonoscopy with attempted  removal of rectal foreign body;  Surgeon: Estrada Chávez MD;  Location[facility]     FAMILY HISTORY  Family History   Problem Relation Age of Onset     Diabetes Type 2  Maternal Grandmother      Diabetes Type 2  Paternal Grandmother      Breast Cancer Paternal Grandmother      Cerebrovascular Disease Father 53     Myocardial Infarction No family hx of      Coronary Artery Disease Early Onset No family hx of      Ovarian Cancer No family hx of      Colon Cancer No family hx of      SOCIAL HISTORY  Social History     Tobacco Use     Smoking status: Never Smoker     Smokeless tobacco: Never Used   Substance Use Topics     Alcohol use: No     Alcohol/week: 0.0 standard drinks     MEDICATIONS  No current facility-administered medications for this encounter.      Current Outpatient Medications   Medication     acetaminophen (TYLENOL) 32 mg/mL liquid     albuterol (PROAIR HFA) 108 (90 Base) MCG/ACT inhaler     alum & mag hydroxide-simethicone (MYLANTA/MAALOX) 200-200-20 MG/5ML SUSP suspension     apixaban ANTICOAGULANT (ELIQUIS STARTER PACK) 5 MG tablet     busPIRone (BUSPAR) 10 MG tablet     calcium carbonate (TUMS) 500 MG chewable tablet     cloNIDine (CATAPRES) 0.1 MG tablet     desvenlafaxine (PRISTIQ) 100 MG 24 hr tablet     diphenhydrAMINE (BENADRYL) 25 MG capsule     docosanol (ABREVA) 10 % CREA cream     gabapentin (NEURONTIN) 600 MG tablet     levonorgestrel (MIRENA) 20 MCG/24HR IUD     lidocaine (XYLOCAINE) 2 % solution     lurasidone (LATUDA) 60 MG TABS tablet     metFORMIN (GLUCOPHAGE-XR) 500 MG 24 hr tablet     ondansetron (ZOFRAN-ODT) 8 MG ODT tab     oxyCODONE (ROXICODONE) 5 MG tablet     pantoprazole (PROTONIX) 40 MG EC tablet     simethicone (MYLICON) 80 MG chewable tablet     sucralfate (CARAFATE) 1 GM/10ML suspension     topiramate (TOPAMAX) 100 MG tablet     vitamin D3 (CHOLECALCIFEROL) 2000 units (50 mcg) tablet      ALLERGIES  Allergies   Allergen Reactions     Amoxicillin-Pot Clavulanate Other (See Comments), Rash and Swelling     PN: facial swelling, left side. Also had cortisone injection the same day as she started the Augmentin.  PN: facial swelling, left side. Also had cortisone injection the same day as she started the Augmentin.  Noted in downtime recovery of chart.       Penicillins Anaphylaxis     Vancomycin Swelling, Itching and Rash     Other reaction(s): Redness  Flushed face, very itchy; HUT Comment: Flushed face, very itchy; HUT Reaction: Angioedema; HUT Reaction: Redness; HUT Severity: Med; HUT Noted: [id number]  Flushed face, very itchy  Flushed face, very itchy  Flushed face, very itchy       Hydrocodone Nausea and Vomiting and GI Disturbance     vomiting for days  vomiting for days  vomiting for days  vomiting for days, PN: vomiting for days  vomiting for days  vomiting for days  vomiting for days  vomiting for days  vomiting for days  vomiting for days, PN: vomiting for days  vomiting for days  vomiting for days  vomiting for days  vomiting for days  ; HUT Comment: vomiting for days; HUT Reaction: Gastrointestinal; HUT Reaction: Nausea And Vomiting; HUT Reaction Type: Intolerance; HUT Severity: Med; HUT Noted: [id number]  vomiting for days       Blood-Group Specific Substance Other (See Comments)     Patient has an anti-Cw and non-specific antibodies. Blood product orders may be delayed. Draw one red top and two purple top tubes for all type/screen/crossmatch orders.     Influenza Vaccines Other (See Comments)     Oseltamivir Hives     med stopped, PN: med stopped     Cephalosporins Rash     Lamotrigine Rash     Possibly associated with Lamictal.   HUT Comment: Possibly associated with Lamictal. ; HUT Reaction: Rash; HUT Reaction Type: Allergy; HUT Severity: Low; HUT Noted: [id number]     Latex Rash         I have reviewed the Medications, Allergies, Past Medical and Surgical History, and Social History in the  "Epic system.    Review of Systems   All other systems reviewed and are negative.      Physical Exam   BP: (!) 155/78  Heart Rate: 102  Temp: 99  F (37.2  C)  Resp: 20  Height: 157.5 cm (5' 2\")  Weight: 112.4 kg (247 lb 11.2 oz)  SpO2: 100 %      Physical Exam  Vitals signs and nursing note reviewed.   Constitutional:       General: She is not in acute distress.     Appearance: She is not diaphoretic.   HENT:      Head: Normocephalic and atraumatic.   Eyes:      Conjunctiva/sclera: Conjunctivae normal.      Pupils: Pupils are equal, round, and reactive to light.   Neck:      Musculoskeletal: Normal range of motion and neck supple.   Cardiovascular:      Rate and Rhythm: Normal rate.   Pulmonary:      Effort: Pulmonary effort is normal. No respiratory distress.   Abdominal:      General: There is no distension.      Palpations: Abdomen is soft.      Tenderness: There is no abdominal tenderness. There is no guarding.   Musculoskeletal: Normal range of motion.   Skin:     General: Skin is warm and dry.   Neurological:      Mental Status: She is alert and oriented to person, place, and time.   Psychiatric:         Behavior: Behavior normal.         Thought Content: Thought content normal.         ED Course        Procedures   9:09 PM  The patient was seen and examined by Dr. Andrade in [facility]                Critical Care time:  none             Labs Ordered and Resulted from Time of ED Arrival Up to the Time of Departure from the ED - No data to display         Assessments & Plan (with Medical Decision Making)   29-year-old female with a history of impulsive foreign body ingestions who presents after she ingested a pen spring, which she has been in the past.  On examination she is in no distress and her exam is benign.  She ingested the pen spring 3 hours ago.  An x-ray confirmed a metal foreign body in the stomach.  Gastroenterology was consulted and will prep for endoscopy in the OR.  Nevin will be discharged " after this.  She was not suicidal but has recurrent impulsive behavior.     I have reviewed the nursing notes.    I have reviewed the findings, diagnosis, plan and need for follow up with the patient.    New Prescriptions    No medications on file       Final diagnoses:   None     I, Yury Hill, am serving as a trained medical scribe to document services personally performed by Florin Andrade MD, based on the provider's statements to me.      IFlorin MD, was physically present and have reviewed and verified the accuracy of this note documented by Yury Hill.     1/3/2020   [facility], [place], EMERGENCY DEPARTMENT     Florin Andrade MD  01/04/20 0058       Florin Andrade MD  01/04/20 0316

## 2020-01-04 NOTE — ANESTHESIA CARE TRANSFER NOTE
Patient: Nevin Alvarado    Procedure(s):  ESOPHAGOGASTRODUODENOSCOPY (EGD) REMOVAL OF FOREIGN BODY.    Diagnosis: * No pre-op diagnosis entered *  Diagnosis Additional Information: No value filed.    Anesthesia Type:   General     Note:  Airway :Face Mask  Patient transferred to:PACU  Comments: Patient is Awake, VSS, following commands. Patient is breathing Spontaneously with face mask . Care report given to PACU RN, and notified of assigned Anesthesiology staff to patient for continuity of PACU care.     Sandoval Lockett M.D.  Anesthesiology Resident CA-2, PGY-3  Pager [phone]  Handoff Report: Identifed the Patient, Identified the Reponsible Provider, Reviewed the pertinent medical history, Discussed the surgical course, Reviewed Intra-OP anesthesia mangement and issues during anesthesia, Set expectations for post-procedure period and Allowed opportunity for questions and acknowledgement of understanding      Vitals: (Last set prior to Anesthesia Care Transfer)    CRNA VITALS  1/4/2020 0106 - 1/4/2020 0141      1/4/2020             Ht Rate:  106    Resp Rate (observed):  (!) 3                Electronically Signed By: Sandoval Lockett MD  January 4, 2020  1:41 AM

## 2020-01-04 NOTE — ED TRIAGE NOTES
Pt arrives to ED ambulatory with complaints of swallowing a spring of a pen 3 hours ago. No other complaints of this time. VSS

## 2020-01-04 NOTE — OR NURSING
Dr Ward called at 1345 and stated patient would be transferred back to the [facility]Emergency room after recovery from phase 1.  She stated she talked with the emergency room physician Dr Jacobo about patient transfer.to ER

## 2020-01-04 NOTE — ANESTHESIA POSTPROCEDURE EVALUATION
Anesthesia POST Procedure Evaluation    Patient: Nevin Alvarado   MRN:     [id number] Gender:   female   Age:    29 year old :      1990        Preoperative Diagnosis: * No pre-op diagnosis entered *   Procedure(s):  ESOPHAGOGASTRODUODENOSCOPY (EGD) REMOVAL OF FOREIGN BODY.   Postop Comments: No value filed.       Anesthesia Type:  Not documented  General    Reportable Event: NO     PAIN: Uncomplicated   Sign Out status: Comfortable, Well controlled pain     PONV: No PONV   Sign Out status:  No Nausea or Vomiting     Neuro/Psych: Uneventful perioperative course   Sign Out Status: Preoperative baseline; Age appropriate mentation     Airway/Resp.: Uneventful perioperative course   Sign Out Status: Non labored breathing, age appropriate RR; Resp. Status within EXPECTED Parameters     CV: Uneventful perioperative course   Sign Out status: Appropriate BP and perfusion indices; Appropriate HR/Rhythm     Disposition:   Sign Out in:  PACU  Disposition: ED.  Recovery Course: Uneventful  Follow-Up: Not required     Comments/Narrative:  Pt denies: N/V, recall. No change to dentition. She will be going back to the ED for further management           Last Anesthesia Record Vitals:  CRNA VITALS  2020 0106 - 2020 0206      2020             NIBP:  150/90    Ht Rate:  101          Last PACU Vitals:  Vitals Value Taken Time   /96 2020  2:25 AM   Temp 36.9  C (98.5  F) 2020  2:08 AM   Pulse 97 2020  2:22 AM   Resp 14 2020  2:25 AM   SpO2 97 % 2020  2:39 AM   Temp src     NIBP 150/90 2020  1:40 AM   Pulse     SpO2     Resp     Temp     Ht Rate 101 2020  1:40 AM   Temp 2     Vitals shown include unvalidated device data.      Electronically Signed By: Sarah Wachter, MD, 2020, 2:40 AM

## 2020-01-04 NOTE — PROCEDURES
Gastroenterology Endoscopy Suite Brief Operative Note    Procedure:  Upper endoscopy   Post-operative diagnosis:  Foreign body removal   Staff Physician:  Dr. Pamela Perez   Fellow/Assistant(s):  Dr. Brice Rosas    Specimens:  Please see final procedure note for further details.   Findings:  Large amount of food seen in the gastric fundus and body. Food removed via Rajput net until straightened pen spring could be found. Spring removed with biopsy forceps. Endoscope reintroduced after foreign body removal for reassessment of esophagus which was without evidence of perforation,tear, or ongoing GI bleeding.    Complications:  None.   Condition:  Stable   Recommendations  Diet:  Return to previous diet  PPI:  N/A  Anti-coagulants/platelets:  Restart anticoagulation now  Octreotide:  N/A  Discharge Planning:   Return patient to emergency department. Ok to discharge to home from GI perspective.

## 2020-01-05 ENCOUNTER — ANESTHESIA EVENT (OUTPATIENT)
Dept: SURGERY | Facility: CLINIC | Age: 30
End: 2020-01-05
Payer: COMMERCIAL

## 2020-01-05 ENCOUNTER — ANESTHESIA (OUTPATIENT)
Dept: SURGERY | Facility: CLINIC | Age: 30
End: 2020-01-05
Payer: COMMERCIAL

## 2020-01-05 ENCOUNTER — HOSPITAL ENCOUNTER (EMERGENCY)
Facility: CLINIC | Age: 30
Discharge: HOME | End: 2020-01-05
Attending: EMERGENCY MEDICINE | Admitting: INTERNAL MEDICINE
Payer: COMMERCIAL

## 2020-01-05 ENCOUNTER — APPOINTMENT (OUTPATIENT)
Dept: GENERAL RADIOLOGY | Facility: CLINIC | Age: 30
End: 2020-01-05
Attending: EMERGENCY MEDICINE
Payer: COMMERCIAL

## 2020-01-05 VITALS
DIASTOLIC BLOOD PRESSURE: 88 MMHG | BODY MASS INDEX: 45.54 KG/M2 | TEMPERATURE: 98.1 F | RESPIRATION RATE: 16 BRPM | SYSTOLIC BLOOD PRESSURE: 132 MMHG | HEART RATE: 83 BPM | WEIGHT: 247.44 LBS | OXYGEN SATURATION: 99 % | HEIGHT: 62 IN

## 2020-01-05 DIAGNOSIS — T18.9XXA SWALLOWED FOREIGN BODY, INITIAL ENCOUNTER: ICD-10-CM

## 2020-01-05 DIAGNOSIS — T18.9XXA FOREIGN BODY IN DIGESTIVE TRACT, INITIAL ENCOUNTER: Primary | ICD-10-CM

## 2020-01-05 LAB — UPPER GI ENDOSCOPY: NORMAL

## 2020-01-05 PROCEDURE — 99285 EMERGENCY DEPT VISIT HI MDM: CPT | Mod: Z6 | Performed by: EMERGENCY MEDICINE

## 2020-01-05 PROCEDURE — 25000132 ZZH RX MED GY IP 250 OP 250 PS 637: Performed by: EMERGENCY MEDICINE

## 2020-01-05 PROCEDURE — 25000566 ZZH SEVOFLURANE, EA 15 MIN: Performed by: INTERNAL MEDICINE

## 2020-01-05 PROCEDURE — 74019 RADEX ABDOMEN 2 VIEWS: CPT

## 2020-01-05 PROCEDURE — 25000128 H RX IP 250 OP 636: Performed by: EMERGENCY MEDICINE

## 2020-01-05 PROCEDURE — 25000128 H RX IP 250 OP 636: Performed by: ANESTHESIOLOGY

## 2020-01-05 PROCEDURE — 37000008 ZZH ANESTHESIA TECHNICAL FEE, 1ST 30 MIN: Performed by: INTERNAL MEDICINE

## 2020-01-05 PROCEDURE — 71000014 ZZH RECOVERY PHASE 1 LEVEL 2 FIRST HR: Performed by: INTERNAL MEDICINE

## 2020-01-05 PROCEDURE — 96374 THER/PROPH/DIAG INJ IV PUSH: CPT | Performed by: EMERGENCY MEDICINE

## 2020-01-05 PROCEDURE — 25000125 ZZHC RX 250: Performed by: STUDENT IN AN ORGANIZED HEALTH CARE EDUCATION/TRAINING PROGRAM

## 2020-01-05 PROCEDURE — 36000051 ZZH SURGERY LEVEL 2 1ST 30 MIN - UMMC: Performed by: INTERNAL MEDICINE

## 2020-01-05 PROCEDURE — 36000053 ZZH SURGERY LEVEL 2 EA 15 ADDTL MIN - UMMC: Performed by: INTERNAL MEDICINE

## 2020-01-05 PROCEDURE — 71000027 ZZH RECOVERY PHASE 2 EACH 15 MINS: Performed by: INTERNAL MEDICINE

## 2020-01-05 PROCEDURE — 25800030 ZZH RX IP 258 OP 636: Performed by: STUDENT IN AN ORGANIZED HEALTH CARE EDUCATION/TRAINING PROGRAM

## 2020-01-05 PROCEDURE — 37000009 ZZH ANESTHESIA TECHNICAL FEE, EACH ADDTL 15 MIN: Performed by: INTERNAL MEDICINE

## 2020-01-05 PROCEDURE — 27210794 ZZH OR GENERAL SUPPLY STERILE: Performed by: INTERNAL MEDICINE

## 2020-01-05 PROCEDURE — 25000125 ZZHC RX 250: Performed by: ANESTHESIOLOGY

## 2020-01-05 PROCEDURE — 99285 EMERGENCY DEPT VISIT HI MDM: CPT | Mod: 25 | Performed by: EMERGENCY MEDICINE

## 2020-01-05 RX ORDER — ONDANSETRON 4 MG/1
4 TABLET, ORALLY DISINTEGRATING ORAL EVERY 30 MIN PRN
Status: DISCONTINUED | OUTPATIENT
Start: 2020-01-05 | End: 2020-01-05 | Stop reason: HOSPADM

## 2020-01-05 RX ORDER — NALOXONE HYDROCHLORIDE 0.4 MG/ML
.1-.4 INJECTION, SOLUTION INTRAMUSCULAR; INTRAVENOUS; SUBCUTANEOUS
Status: DISCONTINUED | OUTPATIENT
Start: 2020-01-05 | End: 2020-01-05

## 2020-01-05 RX ORDER — ONDANSETRON 2 MG/ML
4 INJECTION INTRAMUSCULAR; INTRAVENOUS EVERY 30 MIN PRN
Status: DISCONTINUED | OUTPATIENT
Start: 2020-01-05 | End: 2020-01-05 | Stop reason: HOSPADM

## 2020-01-05 RX ORDER — DIPHENHYDRAMINE HCL 25 MG
25 CAPSULE ORAL ONCE
Status: COMPLETED | OUTPATIENT
Start: 2020-01-05 | End: 2020-01-05

## 2020-01-05 RX ORDER — PROPOFOL 10 MG/ML
INJECTION, EMULSION INTRAVENOUS PRN
Status: DISCONTINUED | OUTPATIENT
Start: 2020-01-05 | End: 2020-01-05

## 2020-01-05 RX ORDER — LIDOCAINE HYDROCHLORIDE 20 MG/ML
INJECTION, SOLUTION INFILTRATION; PERINEURAL PRN
Status: DISCONTINUED | OUTPATIENT
Start: 2020-01-05 | End: 2020-01-05

## 2020-01-05 RX ORDER — HEPARIN SODIUM (PORCINE) LOCK FLUSH IV SOLN 100 UNIT/ML 100 UNIT/ML
5 SOLUTION INTRAVENOUS ONCE
Status: COMPLETED | OUTPATIENT
Start: 2020-01-05 | End: 2020-01-05

## 2020-01-05 RX ORDER — SODIUM CHLORIDE, SODIUM LACTATE, POTASSIUM CHLORIDE, CALCIUM CHLORIDE 600; 310; 30; 20 MG/100ML; MG/100ML; MG/100ML; MG/100ML
INJECTION, SOLUTION INTRAVENOUS CONTINUOUS PRN
Status: DISCONTINUED | OUTPATIENT
Start: 2020-01-05 | End: 2020-01-05

## 2020-01-05 RX ORDER — LORAZEPAM 2 MG/ML
1 INJECTION INTRAMUSCULAR ONCE
Status: COMPLETED | OUTPATIENT
Start: 2020-01-05 | End: 2020-01-05

## 2020-01-05 RX ORDER — EPHEDRINE SULFATE 50 MG/ML
INJECTION, SOLUTION INTRAMUSCULAR; INTRAVENOUS; SUBCUTANEOUS PRN
Status: DISCONTINUED | OUTPATIENT
Start: 2020-01-05 | End: 2020-01-05

## 2020-01-05 RX ORDER — LIDOCAINE 40 MG/G
CREAM TOPICAL
Status: CANCELLED | OUTPATIENT
Start: 2020-01-05

## 2020-01-05 RX ORDER — FENTANYL CITRATE 50 UG/ML
25-50 INJECTION, SOLUTION INTRAMUSCULAR; INTRAVENOUS
Status: DISCONTINUED | OUTPATIENT
Start: 2020-01-05 | End: 2020-01-05 | Stop reason: HOSPADM

## 2020-01-05 RX ORDER — FLUMAZENIL 0.1 MG/ML
0.2 INJECTION, SOLUTION INTRAVENOUS
Status: DISCONTINUED | OUTPATIENT
Start: 2020-01-05 | End: 2020-01-05 | Stop reason: HOSPADM

## 2020-01-05 RX ORDER — NALOXONE HYDROCHLORIDE 0.4 MG/ML
.1-.4 INJECTION, SOLUTION INTRAMUSCULAR; INTRAVENOUS; SUBCUTANEOUS
Status: DISCONTINUED | OUTPATIENT
Start: 2020-01-05 | End: 2020-01-05 | Stop reason: HOSPADM

## 2020-01-05 RX ADMIN — Medication 5 MG: at 18:52

## 2020-01-05 RX ADMIN — ROCURONIUM BROMIDE 10 MG: 10 INJECTION INTRAVENOUS at 18:53

## 2020-01-05 RX ADMIN — Medication 5 ML: at 21:06

## 2020-01-05 RX ADMIN — Medication 100 MG: at 18:53

## 2020-01-05 RX ADMIN — SUGAMMADEX 200 MG: 100 INJECTION, SOLUTION INTRAVENOUS at 19:14

## 2020-01-05 RX ADMIN — PROPOFOL 200 MG: 10 INJECTION, EMULSION INTRAVENOUS at 18:53

## 2020-01-05 RX ADMIN — DIPHENHYDRAMINE HYDROCHLORIDE 25 MG: 25 CAPSULE ORAL at 20:35

## 2020-01-05 RX ADMIN — Medication 5 MG: at 19:05

## 2020-01-05 RX ADMIN — SODIUM CHLORIDE, POTASSIUM CHLORIDE, SODIUM LACTATE AND CALCIUM CHLORIDE: 600; 310; 30; 20 INJECTION, SOLUTION INTRAVENOUS at 18:34

## 2020-01-05 RX ADMIN — LORAZEPAM 1 MG: 2 INJECTION INTRAMUSCULAR; INTRAVENOUS at 17:18

## 2020-01-05 RX ADMIN — LIDOCAINE HYDROCHLORIDE 100 MG: 20 INJECTION, SOLUTION INFILTRATION; PERINEURAL at 18:53

## 2020-01-05 ASSESSMENT — MIFFLIN-ST. JEOR: SCORE: 1805.62

## 2020-01-05 ASSESSMENT — PAIN DESCRIPTION - DESCRIPTORS
DESCRIPTORS: CRAMPING

## 2020-01-05 NOTE — ED AVS SNAPSHOT
[facility], [place], Emergency Department  [address] [place] [id number]  Phone:  [phone]                                    Nevin Alvarado   MRN: [id number]    Department:  [facility], Emergency Department   Date of Visit:  1/5/2020           After Visit Summary Signature Page    I have received my discharge instructions, and my questions have been answered. I have discussed any challenges I see with this plan with the nurse or doctor.    ..........................................................................................................................................  Patient/Patient Representative Signature      ..........................................................................................................................................  Patient Representative Print Name and Relationship to Patient    ..................................................               ................................................  Date                                   Time    ..........................................................................................................................................  Reviewed by Signature/Title    ...................................................              ..............................................  Date                                               Time          22EPIC Rev 08/18

## 2020-01-05 NOTE — ED TRIAGE NOTES
"Nevin was BIBA today for swallowing a straightened pen spring approximately 45 minutes ago.  She states she is feeling \"nervous\" about starting therapy this coming week.  "

## 2020-01-05 NOTE — ED NOTES
Bed: [facility]  Expected date:   Expected time:   Means of arrival:   Comments:  SPF  29F  Swallowed pen spring

## 2020-01-06 NOTE — PROGRESS NOTES
Spoke with Dr. Perez about disposition of patient after phase 1 criteria met. Per Dr. Perez she has been in contact with Dr. Andrade in ED, Dr. Andrade will accept patient back to ED to be discharged. Per Dr. Perez this is standard procedure for patients with GI impaction and forign bodies.

## 2020-01-06 NOTE — ANESTHESIA PREPROCEDURE EVALUATION
Anesthesia Pre-Procedure Evaluation    Patient: Nevin Alvarado   MRN:     [id number] Gender:   female   Age:    29 year old :      1990        Preoperative Diagnosis: * No pre-op diagnosis entered *   Procedure(s):  ESOPHAGOGASTRODUOENOSCOPY WITH FOREIGN BODY REMOVAL     Past Medical History:   Diagnosis Date     ADD (attention deficit disorder)      Anorexia nervosa with bulimia     history of; on Topamax     Anxiety      Borderline personality disorder (H)      Depression      Depressive disorder      H/O self-harm      Lives in independent group home     due to debilitating mental illness     Migraine without aura     no known triggers; on Topamax bid and Imitrex PRN     Morbid obesity (H)      PTSD (post-traumatic stress disorder)      Rectal foreign body - Recurrent issue, self placed      Swallowed foreign body - Recurrent issue, self placed       Past Surgical History:   Procedure Laterality Date     COMBINED ESOPHAGOSCOPY, GASTROSCOPY, DUODENOSCOPY (EGD), REPLACE ESOPHAGEAL STENT N/A 10/9/2019    Procedure: Upper Endoscopy with Suture Placement;  Surgeon: Zurdo Ramirez MD;  [facility]     ESOPHAGOSCOPY, GASTROSCOPY, DUODENOSCOPY (EGD), COMBINED N/A 3/9/2017    Procedure: COMBINED ESOPHAGOSCOPY, GASTROSCOPY, DUODENOSCOPY (EGD), REMOVE FOREIGN BODY;  Surgeon: Avis Guzmán MD;  Location: [facility]     ESOPHAGOSCOPY, GASTROSCOPY, DUODENOSCOPY (EGD), COMBINED N/A 2017    Procedure: COMBINED ESOPHAGOSCOPY, GASTROSCOPY, DUODENOSCOPY (EGD), REMOVE FOREIGN BODY;  EGD removal Foregin body;  Surgeon: Lokesh Paula MD;  [facility]: [facility]     ESOPHAGOSCOPY, GASTROSCOPY, DUODENOSCOPY (EGD), COMBINED N/A 2017    Procedure: COMBINED ESOPHAGOSCOPY, GASTROSCOPY, DUODENOSCOPY (EGD);  COMBINED ESOPHAGOSCOPY, GASTROSCOPY, DUODENOSCOPY (EGD) [[id number]]attempted removal of foreign body;  Surgeon: Pamela Perez MD;  [facility]     ESOPHAGOSCOPY,  GASTROSCOPY, DUODENOSCOPY (EGD), COMBINED N/A 6/9/2017    Procedure: COMBINED ESOPHAGOSCOPY, GASTROSCOPY, DUODENOSCOPY (EGD), REMOVE FOREIGN BODY;  Esophagoscopy, Gastroscopy, Duodenoscopy, Removal of Foreign Body;  Surgeon: Dejon Marsh MD;  [facility]: [facility]     ESOPHAGOSCOPY, GASTROSCOPY, DUODENOSCOPY (EGD), COMBINED N/A 1/6/2018    Procedure: COMBINED ESOPHAGOSCOPY, GASTROSCOPY, DUODENOSCOPY (EGD), REMOVE FOREIGN BODY;  COMBINED ESOPHAGOSCOPY, GASTROSCOPY, DUODENOSCOPY (EGD) [by pascal net and snare with profol sedation;  Surgeon: Feliciano Emmanuel MD;  Location:  GI     ESOPHAGOSCOPY, GASTROSCOPY, DUODENOSCOPY (EGD), COMBINED N/A 3/19/2018    Procedure: COMBINED ESOPHAGOSCOPY, GASTROSCOPY, DUODENOSCOPY (EGD), REMOVE FOREIGN BODY;   Esophagodscopy, Gastroscopy, Duodenoscopy,Foreign Body Removal;  Surgeon: Brice Guzmán MD;  [facility]     ESOPHAGOSCOPY, GASTROSCOPY, DUODENOSCOPY (EGD), COMBINED N/A 4/16/2018    Procedure: COMBINED ESOPHAGOSCOPY, GASTROSCOPY, DUODENOSCOPY (EGD), REMOVE FOREIGN BODY;  Esophagogastroduodenoscopy  Foreign Body Removal X 2;  Surgeon: Royer Moise MD;  [facility]     ESOPHAGOSCOPY, GASTROSCOPY, DUODENOSCOPY (EGD), COMBINED N/A 6/1/2018    Procedure: COMBINED ESOPHAGOSCOPY, GASTROSCOPY, DUODENOSCOPY (EGD), REMOVE FOREIGN BODY;  COMBINED ESOPHAGOSCOPY, GASTROSCOPY, DUODENOSCOPY with removal of foreign body, propofol sedation by anesthesia;  Surgeon: Brice Martinez MD;  Location:  GI     ESOPHAGOSCOPY, GASTROSCOPY, DUODENOSCOPY (EGD), COMBINED N/A 7/25/2018    Procedure: COMBINED ESOPHAGOSCOPY, GASTROSCOPY, DUODENOSCOPY (EGD), REMOVE FOREIGN BODY;;  Surgeon: Candy Castelan MD;  Location:  GI     ESOPHAGOSCOPY, GASTROSCOPY, DUODENOSCOPY (EGD), COMBINED N/A 7/28/2018    Procedure: COMBINED ESOPHAGOSCOPY, GASTROSCOPY, DUODENOSCOPY (EGD), REMOVE FOREIGN BODY;  COMBINED ESOPHAGOSCOPY, GASTROSCOPY, DUODENOSCOPY (EGD), REMOVE FOREIGN  BODY;  Surgeon: Brice Guzmán MD;  [facility]     ESOPHAGOSCOPY, GASTROSCOPY, DUODENOSCOPY (EGD), COMBINED N/A 7/31/2018    Procedure: COMBINED ESOPHAGOSCOPY, GASTROSCOPY, DUODENOSCOPY (EGD);  COMBINED ESOPHAGOSCOPY, GASTROSCOPY, DUODENOSCOPY (EGD) TO REMOVE FOREIGN BODY;  Surgeon: Lokesh Paula MD;  [facility]     ESOPHAGOSCOPY, GASTROSCOPY, DUODENOSCOPY (EGD), COMBINED N/A 8/4/2018    Procedure: COMBINED ESOPHAGOSCOPY, GASTROSCOPY, DUODENOSCOPY (EGD), REMOVE FOREIGN BODY;   combined esophagoscopy, gastroscopy, duodenoscopy, REMOVE FOREIGN BODY ;  Surgeon: Lokesh Paula MD;  [facility]     ESOPHAGOSCOPY, GASTROSCOPY, DUODENOSCOPY (EGD), COMBINED N/A 10/6/2019    Procedure: ESOPHAGOGASTRODUODENOSCOPY (EGD) with fireign body removal x2, esophageal stent placement with floroscopy;  Surgeon: Timoteo Espana MD;  [facility]     ESOPHAGOSCOPY, GASTROSCOPY, DUODENOSCOPY (EGD), COMBINED N/A 12/2/2019    Procedure: Esophagogastroduodenoscopy with esophageal stent removal, esophogram;  Surgeon: Kailee Tena MD;  [facility]     ESOPHAGOSCOPY, GASTROSCOPY, DUODENOSCOPY (EGD), COMBINED N/A 12/17/2019    Procedure: ESOPHAGOGASTRODUODENOSCOPY, WITH FOREIGN BODY REMOVAL;  Surgeon: Pamela Perez MD;  [facility]     ESOPHAGOSCOPY, GASTROSCOPY, DUODENOSCOPY (EGD), COMBINED N/A 12/13/2019    Procedure: ESOPHAGOGASTRODUODENOSCOPY, WITH FOREIGN BODY REMOVAL;  Surgeon: Samia Stanton MD;  [facility]     ESOPHAGOSCOPY, GASTROSCOPY, DUODENOSCOPY (EGD), COMBINED N/A 12/28/2019    Procedure: ESOPHAGOGASTRODUODENOSCOPY (EGD) Removal of Foreign Body X 2;  Surgeon: Huy Kelley MD;  [facility]     EXAM UNDER ANESTHESIA ANUS N/A 1/10/2017    Procedure: EXAM UNDER ANESTHESIA ANUS;  Surgeon: Annmarie Haynes MD;  [facility]     EXAM UNDER ANESTHESIA RECTUM N/A 7/19/2018    Procedure: EXAM UNDER ANESTHESIA RECTUM;  EXAM UNDER ANESTHESIA,  REMOVAL OF RECTAL FOREIGN BODY;  Surgeon: Annmarie Haynes MD;  [facility]     HC REMOVE FECAL IMPACTION OR FB W ANESTHESIA N/A 12/18/2016    Procedure: REMOVE FECAL IMPACTION/FOREIGN BODY UNDER ANESTHESIA;  Surgeon: Soham Cano MD;  Location: [facility]     KNEE SURGERY - removed a small tissue mass from knee Right      LAPAROSCOPIC ABLATION ENDOMETRIOSIS       LAPAROTOMY EXPLORATORY N/A 1/10/2017    Procedure: LAPAROTOMY EXPLORATORY;  Surgeon: Annmarie Haynes MD;  [facility]: [facility]     LAPAROTOMY EXPLORATORY  09/11/2019    Manual manipulation of bowel to remove pill bottle in rectum     lymph nodes removed from neck; benign  age 7     MAMMOPLASTY REDUCTION Bilateral      RELEASE CARPAL TUNNEL Bilateral      SIGMOIDOSCOPY FLEXIBLE N/A 1/10/2017    Procedure: SIGMOIDOSCOPY FLEXIBLE;  Surgeon: Annmarie Haynes MD;  [facility]     SIGMOIDOSCOPY FLEXIBLE N/A 5/8/2018    Procedure: SIGMOIDOSCOPY FLEXIBLE;  flex sig with foreign body removal using snare and rattooth forcep;  Surgeon: Soham Cano MD;  Location:  GI     SIGMOIDOSCOPY FLEXIBLE N/A 2/20/2019    Procedure: Exam under anesthesia Colonoscopy with attempted  removal of rectal foreign body;  Surgeon: Estrada Chávez MD;  Location: [facility]          Anesthesia Evaluation     . Pt has had prior anesthetic. Type: General and MAC    No history of anesthetic complications          ROS/MED HX    ENT/Pulmonary:  - neg pulmonary ROS   (+)ZOHRA risk factors obese, , . .    Neurologic:  - neg neurologic ROS     Cardiovascular:     (+) ----. : . . fainting (syncope). :. .       METS/Exercise Tolerance:  >4 METS   Hematologic: Comments: Anticoagulation held >12h - neg hematologic  ROS   (+) History of blood clots pt is anticoagulated, -      Musculoskeletal:  - neg musculoskeletal ROS       GI/Hepatic: Comment: S/p multiple foreign body ingestions and rectal insertions resulting in esophogeal perforation, stenting 10/9/2019,  stricture, pain  Stent removed 12/03 12/19 new esophageal perf from attempted EGD removal of samuel ruffin  1/3/20 returning to OR after swallowing pen spring        Renal/Genitourinary:  - ROS Renal section negative       Endo:  - neg endo ROS   (+) Obesity, .      Psychiatric: Comment: PTSD, Intermittent suicidal ideation      (+) psychiatric history anxiety, depression and bipolar      Infectious Disease:  - neg infectious disease ROS       Malignancy:      - no malignancy   Other:    (+) No chance of pregnancy C-spine cleared: N/A, no H/O Chronic Pain,no other significant disability   - neg other ROS                     PHYSICAL EXAM:   Mental Status/Neuro: A/A/O   Airway: Facies: Feasible  Mallampati: II  Mouth/Opening: Full  TM distance: > 6 cm  Neck ROM: Full   Respiratory:    CV:    Comments:                      LABS:  CBC:   Lab Results   Component Value Date    WBC 9.5 01/01/2020    WBC 10.0 12/28/2019    HGB 11.9 01/01/2020    HGB 11.6 (L) 12/28/2019    HCT 36.5 01/01/2020    HCT 36.4 12/28/2019     01/01/2020     12/28/2019     BMP:   Lab Results   Component Value Date     12/28/2019     12/17/2019    POTASSIUM 3.6 12/28/2019    POTASSIUM 3.6 12/17/2019    CHLORIDE 114 (H) 12/28/2019    CHLORIDE 112 (H) 12/17/2019    CO2 24 12/28/2019    CO2 25 12/17/2019    BUN 6 (L) 12/28/2019    BUN 10 12/17/2019    CR 0.48 (L) 12/28/2019    CR 0.53 12/17/2019     (H) 12/28/2019    GLC 94 12/17/2019     COAGS:   Lab Results   Component Value Date    PTT 51 (H) 12/28/2019    INR 1.42 (H) 12/28/2019     POC:   Lab Results   Component Value Date    BGM 99 11/03/2019    HCG Negative 12/02/2019    HCGS Negative 11/28/2019     OTHER:   Lab Results   Component Value Date    LACT 1.0 11/28/2019    KELBY 8.5 12/28/2019    PHOS 3.5 12/01/2019    MAG 1.9 12/05/2019    ALBUMIN 3.1 (L) 12/09/2019    PROTTOTAL 6.2 (L) 12/09/2019    ALT 24 12/09/2019    AST 12 12/09/2019    ALKPHOS 73  "12/09/2019    BILITOTAL 0.2 12/09/2019    LIPASE 40 (L) 12/04/2019    TSH 0.66 03/21/2018    T4 0.96 05/16/2017    CRP 6.6 12/09/2019    SED 26 (H) 07/11/2017        Preop Vitals    BP Readings from Last 3 Encounters:   01/05/20 119/72   01/04/20 (!) 145/87   01/01/20 127/77    Pulse Readings from Last 3 Encounters:   01/05/20 83   01/01/20 80   12/28/19 96      Resp Readings from Last 3 Encounters:   01/05/20 16   01/04/20 14   01/01/20 12    SpO2 Readings from Last 3 Encounters:   01/05/20 99%   01/04/20 94%   01/01/20 98%      Temp Readings from Last 1 Encounters:   01/05/20 36.8  C (98.2  F) (Oral)    Ht Readings from Last 1 Encounters:   01/05/20 1.575 m (5' 2\")      Wt Readings from Last 1 Encounters:   01/05/20 112.2 kg (247 lb 7 oz)    Estimated body mass index is 45.26 kg/m  as calculated from the following:    Height as of this encounter: 1.575 m (5' 2\").    Weight as of this encounter: 112.2 kg (247 lb 7 oz).     LDA:  Port A Cath Single 12/17/19 Right Chest wall (Active)   Access Date 01/05/20 1/5/2020  5:18 PM   Access Attempts 1 1/5/2020  5:18 PM   Gauge Power noncoring 90 degree bend;20 gauge;3/4 inch 1/5/2020  5:18 PM   Site Assessment WDL 1/5/2020  5:18 PM   Line Status Blood return noted;Saline locked 1/5/2020  5:18 PM   Extravasation? No 1/5/2020  5:18 PM   Dressing Intervention Transparent;New dressing 1/5/2020  5:18 PM   Number of days: 19        Assessment:   ASA SCORE: 3 emergent   H&P: History and physical reviewed and following examination; no interval change.   Smoking Status:  Non-Smoker/Unknown   NPO Status: ELEVATED Aspiration Risk/Unknown     Plan:   Anes. Type:  General   Pre-Medication: None   Induction:  IV (RSI)   Airway: ETT; Oral   Access/Monitoring: PIV   Maintenance: Balanced     Postop Plan:   Postop Pain: NSAID  Postop Sedation/Airway: Not planned  Disposition: Outpatient     PONV Management:   Adult Risk Factors: Female, Non-Smoker   Prevention: Ondansetron, Dexamethasone "     CONSENT: Direct conversation   Plan and risks discussed with: Patient   Blood Products: Consent Deferred (Minimal Blood Loss)                   Susan Messina MD

## 2020-01-06 NOTE — ANESTHESIA POSTPROCEDURE EVALUATION
Anesthesia POST Procedure Evaluation    Patient: Nevin Alvarado   MRN:     [id number] Gender:   female   Age:    29 year old :      1990        Preoperative Diagnosis: * No pre-op diagnosis entered *   Procedure(s):  ESOPHAGOGASTRODUOENOSCOPY WITH FOREIGN BODY REMOVAL   Postop Comments: No value filed.       Anesthesia Type:  Not documented  General    Reportable Event: NO     PAIN: Uncomplicated   Sign Out status: Comfortable, Well controlled pain     PONV: No PONV   Sign Out status:  No Nausea or Vomiting     Neuro/Psych: Uneventful perioperative course   Sign Out Status: Preoperative baseline; Age appropriate mentation     Airway/Resp.: Uneventful perioperative course   Sign Out Status: Non labored breathing, age appropriate RR; Resp. Status within EXPECTED Parameters     CV: Uneventful perioperative course   Sign Out status: Appropriate BP and perfusion indices; Appropriate HR/Rhythm     Disposition:   Sign Out in:  PACU  Disposition:  Phase II; Home  Recovery Course: Uneventful  Follow-Up: Not required     Comments/Narrative:  The patient did not receive narcotics at any point of her anesthetic.  I told her she would not receive narcotics in the future for these procedures.  There was no bleeding, trauma or irritation during the endoscopy.  The abdominal discomfort she feels is likely gas distention.  She understands our perspective an the care she will receive in the future if she consumes foreign bodies in the future.           Last Anesthesia Record Vitals:  CRNA VITALS  2020 1847 - 2020 1935      2020             NIBP Mean:  100    Ht Rate:  82          Last PACU Vitals:  Vitals Value Taken Time   /85 2020  7:30 PM   Temp     Pulse 82 2020  7:30 PM   Resp     SpO2 99 % 2020  7:34 PM   Temp src     NIBP     Pulse     SpO2     Resp     Temp     Ht Rate 82 2020  7:25 PM   Temp 2     Vitals shown include unvalidated device data.      Electronically Signed By:  Reece Mendez MD, January 5, 2020, 7:35 PM

## 2020-01-06 NOTE — ANESTHESIA CARE TRANSFER NOTE
Patient: Nevin Alvarado    Procedure(s):  ESOPHAGOGASTRODUOENOSCOPY WITH FOREIGN BODY REMOVAL    Diagnosis: * No pre-op diagnosis entered *  Diagnosis Additional Information: No value filed.    Anesthesia Type:   General     Note:  Airway :Room Air  Patient transferred to:PACU  Handoff Report: Identifed the Patient, Identified the Reponsible Provider, Reviewed the pertinent medical history, Discussed the surgical course, Reviewed Intra-OP anesthesia mangement and issues during anesthesia, Set expectations for post-procedure period and Allowed opportunity for questions and acknowledgement of understanding      Vitals: (Last set prior to Anesthesia Care Transfer)    CRNA VITALS  1/5/2020 1847 - 1/5/2020 1929      1/5/2020             NIBP Mean:  100    Ht Rate:  82                Electronically Signed By: Susan Messina MD  January 5, 2020  7:29 PM

## 2020-01-06 NOTE — ED NOTES
Bed: [facility]  Expected date:   Expected time:   Means of arrival:   Comments:  Patient returning

## 2020-01-06 NOTE — DISCHARGE INSTRUCTIONS
Please follow-up with your therapist and psychiatry appointments.  If you feel the impulse to ingest something, please contact a family member or a crisis line.  You may also return to the ER prior to ingesting something if this will help you avoid the impulse.     [facility], [place]  Same-Day Surgery   Adult Discharge Orders & Instructions       *Take it easy when you get home.  Remember, same day surgery DOES NOT MEAN SAME DAY RECOVERY!    *Healing is a gradual process and you will need some time to recover - you may be more tired than you realize at first.    *Rest and relax for at least the first 24 hours at home.  You'll feel better and heal faster if you take good care of yourself.    For 24 hours after surgery    1. A responsible adult must stay with you for at least 24 hours after you leave the hospital.   2. Do not drink alcohol, drive or use heavy equipment for 24 hours. This is because you have had anesthesia medications.  3.  Avoid strenuous or risky activities for 24 hours.  Ask for help when climbing stairs.   4. You may feel lightheaded, if so, sit for a few minutes before standing.  You may need someone to help you get up.   5. If you have nausea (feel sick to your stomach): Drink only clear liquids such as water, apple juice, ginger ale, or broth. Rest may also help. Be sure to drink enough fluids. Move to a regular diet as you feel able.  6. You may have a slight fever. Call the doctor if your fever is over 100 F (37.7 C) (taken under the tongue) or lasts longer than 24 hours.  7. You may have a dry mouth, a sore throat, muscle aches or trouble sleeping.  These should go away after 24 hours.  8. Do not make important or legal decisions.     Call your doctor for any of the followin.  Signs of infection: fever, growing tenderness at the surgery site, a large amount of drainage or bleeding, severe pain, foul-smelling drainage, redness, swelling. Please call if you  "experience any of these symptoms.    2. If it has been 8 to 10 hours since surgery and you are still not able to urinate (pass water), please call.    3.  If you have a headache for over 24 hours, please call.    To contact a doctor, call Dr. Perez's office at [phone] or:    '   [phone] and ask for \"the resident on call for gastroenterology \" (this is the hospital and is answered 24 hours a day)    '   Emergency Department: [facility]: [phone]       (TTY for hearing impaired: [phone])      "

## 2020-01-06 NOTE — ED PROVIDER NOTES
History     Chief Complaint   Patient presents with     Swallowed Foreign Body     HPI  Nevin Alvarado is a 29 year old female with a history of impulsive foreign body ingestions who swallowed a flattened pen spring approximately an hour and a half ago.  About 30 minutes prior to that she had something to eat.  She reports some upper abdominal pain but has had no vomiting.  No blood in her stools.  She was seen in the ER 2 days ago for similar presentation and had an endoscopy with removal of the foreign body.  She denies any suicidal ideation.  She has had multiple psychiatric evaluations over the past year, along with hospitalizations.  She states she felt impulsive after she found a pen at home.  She also reports anxiety related to an argument with her sister as well as an upcoming new therapist appointment.    PAST MEDICAL HISTORY:   Past Medical History:   Diagnosis Date     ADD (attention deficit disorder)      Anorexia nervosa with bulimia     history of; on Topamax     Anxiety      Borderline personality disorder (H)      Depression      Depressive disorder      H/O self-harm      Lives in independent group home     due to debilitating mental illness     Migraine without aura     no known triggers; on Topamax bid and Imitrex PRN     Morbid obesity (H)      PTSD (post-traumatic stress disorder)      Rectal foreign body - Recurrent issue, self placed      Swallowed foreign body - Recurrent issue, self placed        PAST SURGICAL HISTORY:   Past Surgical History:   Procedure Laterality Date     COMBINED ESOPHAGOSCOPY, GASTROSCOPY, DUODENOSCOPY (EGD), REPLACE ESOPHAGEAL STENT N/A 10/9/2019    Procedure: Upper Endoscopy with Suture Placement;  Surgeon: Zurdo Ramirez MD;  Location: [facility]     ESOPHAGOSCOPY, GASTROSCOPY, DUODENOSCOPY (EGD), COMBINED N/A 3/9/2017    Procedure: COMBINED ESOPHAGOSCOPY, GASTROSCOPY, DUODENOSCOPY (EGD), REMOVE FOREIGN BODY;  Surgeon: Avis Guzmán MD;   Location: [facility]     ESOPHAGOSCOPY, GASTROSCOPY, DUODENOSCOPY (EGD), COMBINED N/A 4/20/2017    Procedure: COMBINED ESOPHAGOSCOPY, GASTROSCOPY, DUODENOSCOPY (EGD), REMOVE FOREIGN BODY;  EGD removal Foregin body;  Surgeon: Lokesh Paula MD;  Location: [facility]     ESOPHAGOSCOPY, GASTROSCOPY, DUODENOSCOPY (EGD), COMBINED N/A 6/12/2017    Procedure: COMBINED ESOPHAGOSCOPY, GASTROSCOPY, DUODENOSCOPY (EGD);  COMBINED ESOPHAGOSCOPY, GASTROSCOPY, DUODENOSCOPY (EGD) [[id number]]attempted removal of foreign body;  Surgeon: Pamela Perez MD;  [facility]: [facility]     ESOPHAGOSCOPY, GASTROSCOPY, DUODENOSCOPY (EGD), COMBINED N/A 6/9/2017    Procedure: COMBINED ESOPHAGOSCOPY, GASTROSCOPY, DUODENOSCOPY (EGD), REMOVE FOREIGN BODY;  Esophagoscopy, Gastroscopy, Duodenoscopy, Removal of Foreign Body;  Surgeon: Dejon Marsh MD;  Location: [facility]     ESOPHAGOSCOPY, GASTROSCOPY, DUODENOSCOPY (EGD), COMBINED N/A 1/6/2018    Procedure: COMBINED ESOPHAGOSCOPY, GASTROSCOPY, DUODENOSCOPY (EGD), REMOVE FOREIGN BODY;  COMBINED ESOPHAGOSCOPY, GASTROSCOPY, DUODENOSCOPY (EGD) [by pascal net and snare with profol sedation;  Surgeon: Feliciano Emmanuel MD;  Location:  GI     ESOPHAGOSCOPY, GASTROSCOPY, DUODENOSCOPY (EGD), COMBINED N/A 3/19/2018    Procedure: COMBINED ESOPHAGOSCOPY, GASTROSCOPY, DUODENOSCOPY (EGD), REMOVE FOREIGN BODY;   Esophagodscopy, Gastroscopy, Duodenoscopy,Foreign Body Removal;  Surgeon: Brice Guzmán MD;  [facility]: [facility]     ESOPHAGOSCOPY, GASTROSCOPY, DUODENOSCOPY (EGD), COMBINED N/A 4/16/2018    Procedure: COMBINED ESOPHAGOSCOPY, GASTROSCOPY, DUODENOSCOPY (EGD), REMOVE FOREIGN BODY;  Esophagogastroduodenoscopy  Foreign Body Removal X 2;  Surgeon: Royer Moise MD;  [facility]     ESOPHAGOSCOPY, GASTROSCOPY, DUODENOSCOPY (EGD), COMBINED N/A 6/1/2018    Procedure: COMBINED ESOPHAGOSCOPY, GASTROSCOPY, DUODENOSCOPY (EGD), REMOVE FOREIGN BODY;  COMBINED ESOPHAGOSCOPY, GASTROSCOPY,  DUODENOSCOPY with removal of foreign body, propofol sedation by anesthesia;  Surgeon: Brice Martinez MD;  Location:  GI     ESOPHAGOSCOPY, GASTROSCOPY, DUODENOSCOPY (EGD), COMBINED N/A 7/25/2018    Procedure: COMBINED ESOPHAGOSCOPY, GASTROSCOPY, DUODENOSCOPY (EGD), REMOVE FOREIGN BODY;;  Surgeon: Candy Castelan MD;  Location:  GI     ESOPHAGOSCOPY, GASTROSCOPY, DUODENOSCOPY (EGD), COMBINED N/A 7/28/2018    Procedure: COMBINED ESOPHAGOSCOPY, GASTROSCOPY, DUODENOSCOPY (EGD), REMOVE FOREIGN BODY;  COMBINED ESOPHAGOSCOPY, GASTROSCOPY, DUODENOSCOPY (EGD), REMOVE FOREIGN BODY;  Surgeon: Brice Guzmán MD;  [facility]     ESOPHAGOSCOPY, GASTROSCOPY, DUODENOSCOPY (EGD), COMBINED N/A 7/31/2018    Procedure: COMBINED ESOPHAGOSCOPY, GASTROSCOPY, DUODENOSCOPY (EGD);  COMBINED ESOPHAGOSCOPY, GASTROSCOPY, DUODENOSCOPY (EGD) TO REMOVE FOREIGN BODY;  Surgeon: Lokesh Paula MD;  [facility]     ESOPHAGOSCOPY, GASTROSCOPY, DUODENOSCOPY (EGD), COMBINED N/A 8/4/2018    Procedure: COMBINED ESOPHAGOSCOPY, GASTROSCOPY, DUODENOSCOPY (EGD), REMOVE FOREIGN BODY;   combined esophagoscopy, gastroscopy, duodenoscopy, REMOVE FOREIGN BODY ;  Surgeon: Lokesh Paula MD;  [facility]: [facility]     ESOPHAGOSCOPY, GASTROSCOPY, DUODENOSCOPY (EGD), COMBINED N/A 10/6/2019    Procedure: ESOPHAGOGASTRODUODENOSCOPY (EGD) with fireign body removal x2, esophageal stent placement with floroscopy;  Surgeon: Timoteo Espana MD;  [facility]     ESOPHAGOSCOPY, GASTROSCOPY, DUODENOSCOPY (EGD), COMBINED N/A 12/2/2019    Procedure: Esophagogastroduodenoscopy with esophageal stent removal, esophogram;  Surgeon: Kailee Tena MD;  [facility]     ESOPHAGOSCOPY, GASTROSCOPY, DUODENOSCOPY (EGD), COMBINED N/A 12/17/2019    Procedure: ESOPHAGOGASTRODUODENOSCOPY, WITH FOREIGN BODY REMOVAL;  Surgeon: Pamela Perez MD;  [facility]     ESOPHAGOSCOPY, GASTROSCOPY, DUODENOSCOPY (EGD), COMBINED N/A  12/13/2019    Procedure: ESOPHAGOGASTRODUODENOSCOPY, WITH FOREIGN BODY REMOVAL;  Surgeon: Samia Stanton MD;  [facility]     ESOPHAGOSCOPY, GASTROSCOPY, DUODENOSCOPY (EGD), COMBINED N/A 12/28/2019    Procedure: ESOPHAGOGASTRODUODENOSCOPY (EGD) Removal of Foreign Body X 2;  Surgeon: Huy Kelley MD;  [facility]     EXAM UNDER ANESTHESIA ANUS N/A 1/10/2017    Procedure: EXAM UNDER ANESTHESIA ANUS;  Surgeon: Annmarie Haynes MD;  [facility]     EXAM UNDER ANESTHESIA RECTUM N/A 7/19/2018    Procedure: EXAM UNDER ANESTHESIA RECTUM;  EXAM UNDER ANESTHESIA, REMOVAL OF RECTAL FOREIGN BODY;  Surgeon: Annmarie Haynes MD;  [facility]: [facility]     HC REMOVE FECAL IMPACTION OR FB W ANESTHESIA N/A 12/18/2016    Procedure: REMOVE FECAL IMPACTION/FOREIGN BODY UNDER ANESTHESIA;  Surgeon: Soham Cano MD;  Location: [facility]     KNEE SURGERY - removed a small tissue mass from knee Right      LAPAROSCOPIC ABLATION ENDOMETRIOSIS       LAPAROTOMY EXPLORATORY N/A 1/10/2017    Procedure: LAPAROTOMY EXPLORATORY;  Surgeon: Annmarie Haynes MD;  Location: [facility]     LAPAROTOMY EXPLORATORY  09/11/2019    Manual manipulation of bowel to remove pill bottle in rectum     lymph nodes removed from neck; benign  age 7     MAMMOPLASTY REDUCTION Bilateral      RELEASE CARPAL TUNNEL Bilateral      SIGMOIDOSCOPY FLEXIBLE N/A 1/10/2017    Procedure: SIGMOIDOSCOPY FLEXIBLE;  Surgeon: Annmarie Haynes MD;  [facility]: [facility]     SIGMOIDOSCOPY FLEXIBLE N/A 5/8/2018    Procedure: SIGMOIDOSCOPY FLEXIBLE;  flex sig with foreign body removal using snare and rattooth forcep;  Surgeon: Soham Cano MD;  Location: [facility]     SIGMOIDOSCOPY FLEXIBLE N/A 2/20/2019    Procedure: Exam under anesthesia Colonoscopy with attempted  removal of rectal foreign body;  Surgeon: Estrada Chávez MD;  Location: [facility]       FAMILY HISTORY:   Family History   Problem Relation Age of Onset     Diabetes Type 2  Maternal  Grandmother      Diabetes Type 2  Paternal Grandmother      Breast Cancer Paternal Grandmother      Cerebrovascular Disease Father 53     Myocardial Infarction No family hx of      Coronary Artery Disease Early Onset No family hx of      Ovarian Cancer No family hx of      Colon Cancer No family hx of        SOCIAL HISTORY:   Social History     Tobacco Use     Smoking status: Never Smoker     Smokeless tobacco: Never Used   Substance Use Topics     Alcohol use: No     Alcohol/week: 0.0 standard drinks       Current Discharge Medication List      CONTINUE these medications which have NOT CHANGED    Details   acetaminophen (TYLENOL) 32 mg/mL liquid Take 31.25 mLs (1,000 mg) by mouth every 6 hours as needed for fever or pain  Qty: 355 mL, Refills: 0    Associated Diagnoses: Esophageal dysphagia; Hx of foreign body ingestion      albuterol (PROAIR HFA) 108 (90 Base) MCG/ACT inhaler Inhale 2 puffs into the lungs 4 times daily as needed       alum & mag hydroxide-simethicone (MYLANTA/MAALOX) 200-200-20 MG/5ML SUSP suspension Take 30 mLs by mouth every 6 hours as needed for indigestion      busPIRone (BUSPAR) 10 MG tablet Take 1 tablet (10 mg) by mouth twice daily      calcium carbonate (TUMS) 500 MG chewable tablet Take 1 chew tab by mouth 3 times daily as needed       cloNIDine (CATAPRES) 0.1 MG tablet Take 0.1 mg by mouth 2 times daily       desvenlafaxine (PRISTIQ) 100 MG 24 hr tablet Take 1 tablet (100 mg) by mouth every morning      diphenhydrAMINE (BENADRYL) 25 MG capsule Take 1-2 capsules (25-50 mg) by mouth every 6 hours as needed for itching or sleep      docosanol (ABREVA) 10 % CREA cream Apply to lip 5 times a day as soon as symptoms begin, do not use for more than 10 days.      gabapentin (NEURONTIN) 600 MG tablet Take 600 mg by mouth 3 times daily       levonorgestrel (MIRENA) 20 MCG/24HR IUD 1 each by Intrauterine route once      lidocaine (XYLOCAINE) 2 % solution Swish and spit 15 mLs in mouth every 6 hours  as needed (soar throat)  Qty: 100 mL, Refills: 0    Associated Diagnoses: Esophageal dysphagia; Hx of foreign body ingestion      lurasidone (LATUDA) 60 MG TABS tablet Take 1 tablet (60 mg) by mouth at bedtime      metFORMIN (GLUCOPHAGE-XR) 500 MG 24 hr tablet Take 1 tablet (500 mg) by mouth daily      ondansetron (ZOFRAN-ODT) 8 MG ODT tab Take 1 tablet (8 mg) by mouth every 6 hours as needed for nausea or vomiting  Qty: 20 tablet, Refills: 1    Associated Diagnoses: Swallowed foreign body, initial encounter      oxyCODONE (ROXICODONE) 5 MG tablet Take 5 mg by mouth every 4 hours as needed (pain) (patient rarely uses)      pantoprazole (PROTONIX) 40 MG EC tablet Take 1 tablet (40 mg) by mouth 2 times daily  Qty: 62 tablet, Refills: 0    Associated Diagnoses: Foreign body in digestive tract, initial encounter      simethicone (MYLICON) 80 MG chewable tablet Take 1 tablet (80 mg) by mouth every 6 hours as needed for flatulence or cramping (after meals)  Qty: 30 tablet, Refills: 0    Associated Diagnoses: Esophageal dysphagia; Hx of foreign body ingestion      sucralfate (CARAFATE) 1 GM/10ML suspension Take 10 mLs (1 g) by mouth 4 times daily  Qty: 420 mL, Refills: 1    Associated Diagnoses: Esophageal dysphagia      topiramate (TOPAMAX) 100 MG tablet Take 1 tablet (100 mg) by mouth daily at bedtime      vitamin D3 (CHOLECALCIFEROL) 2000 units (50 mcg) tablet Take 1 tablet (2,000 units) by mouth daily         STOP taking these medications       apixaban ANTICOAGULANT (ELIQUIS STARTER PACK) 5 MG tablet Comments:   Reason for Stopping:         hydrOXYzine (ATARAX) 25 MG tablet Comments:   Reason for Stopping:                  Allergies   Allergen Reactions     Amoxicillin-Pot Clavulanate Other (See Comments), Rash and Swelling     PN: facial swelling, left side. Also had cortisone injection the same day as she started the Augmentin.  PN: facial swelling, left side. Also had cortisone injection the same day as she started  "the Augmentin.  Noted in downtime recovery of chart.       Penicillins Anaphylaxis     Vancomycin Swelling, Itching and Rash     Other reaction(s): Redness  Flushed face, very itchy; HUT Comment: Flushed face, very itchy; HUT Reaction: Angioedema; HUT Reaction: Redness; HUT Severity: Med; HUT Noted: [id number]  Flushed face, very itchy  Flushed face, very itchy  Flushed face, very itchy       Hydrocodone Nausea and Vomiting and GI Disturbance     vomiting for days  vomiting for days  vomiting for days  vomiting for days, PN: vomiting for days  vomiting for days  vomiting for days  vomiting for days  vomiting for days  vomiting for days  vomiting for days, PN: vomiting for days  vomiting for days  vomiting for days  vomiting for days  vomiting for days  ; HUT Comment: vomiting for days; HUT Reaction: Gastrointestinal; HUT Reaction: Nausea And Vomiting; HUT Reaction Type: Intolerance; HUT Severity: Med; HUT Noted: [id number]  vomiting for days       Blood-Group Specific Substance Other (See Comments)     Patient has an anti-Cw and non-specific antibodies. Blood product orders may be delayed. Draw one red top and two purple top tubes for all type/screen/crossmatch orders.     Influenza Vaccines Other (See Comments)     Oseltamivir Hives     med stopped, PN: med stopped     Cephalosporins Rash     Lamotrigine Rash     Possibly associated with Lamictal.   HUT Comment: Possibly associated with Lamictal. ; HUT Reaction: Rash; HUT Reaction Type: Allergy; HUT Severity: Low; HUT Noted: [id number]     Latex Rash         I have reviewed the Medications, Allergies, Past Medical and Surgical History, and Social History in the Epic system.    Review of Systems   All other systems reviewed and are negative.      Physical Exam   BP: 130/82  Pulse: 83  Heart Rate: 78  Temp: 98.2  F (36.8  C)  Resp: 16  Height: 157.5 cm (5' 2\")  Weight: 112.2 kg (247 lb 7 oz)  SpO2: 100 %      Physical Exam  Vitals signs and nursing note reviewed. "   Constitutional:       General: She is not in acute distress.     Appearance: She is not diaphoretic.   HENT:      Head: Normocephalic and atraumatic.   Eyes:      Conjunctiva/sclera: Conjunctivae normal.      Pupils: Pupils are equal, round, and reactive to light.   Neck:      Musculoskeletal: Normal range of motion and neck supple.   Cardiovascular:      Rate and Rhythm: Normal rate.   Pulmonary:      Effort: Pulmonary effort is normal. No respiratory distress.   Abdominal:      General: There is no distension.      Palpations: Abdomen is soft.      Tenderness: There is no abdominal tenderness. There is no guarding or rebound.   Musculoskeletal: Normal range of motion.   Skin:     General: Skin is warm and dry.   Neurological:      Mental Status: She is alert and oriented to person, place, and time.   Psychiatric:         Behavior: Behavior normal.         Thought Content: Thought content normal.      Comments: anxious         ED Course        Procedures             Critical Care time:  none             Labs Ordered and Resulted from Time of ED Arrival Up to the Time of Departure from the ED - No data to display         Assessments & Plan (with Medical Decision Making)   29-year-old female who presents with a congested pen spring approximately an hour to 2 hours prior to arrival.  An x-ray shows a foreign body in the stomach with no signs of perforation or free air.  Her abdominal exam is benign.  I discussed the case with gastroenterology who will bring her for endoscopy in the OR.  Is not suicidal and is suitable for discharge given this chronic problem.  She is currently having an argument with her sister and she should contact her mother to see if she can stay with her to help ease the stress that she has been having recently.  Patient be signed out to the oncoming ER physician to follow-up on her final disposition.     I have reviewed the nursing notes.    I have reviewed the findings, diagnosis, plan and  need for follow up with the patient.    Current Discharge Medication List          Final diagnoses:   Foreign body in digestive tract, initial encounter       1/5/2020    [facility]     Florin Andrade MD  01/05/20 [id number]

## 2020-01-06 NOTE — PROCEDURES
G[facility]ology Endoscopy [facility] Brief Operative Note    Procedure:  Upper endoscopy   Post-operative diagnosis:  Foreign body ingestion   Staff Physician:  Dr. Pamela Perez   Fellow/Assistant(s):  Dr. Sonam TINSLEY    Specimens:  Please see final procedure note for further details.   Findings:  Large amount of food seen in the gastric cardia, fundus, and body. Straightened metal spring seen in gastric body - pulled from food and ultimately removed with rat tooth forceps. Remainder of visualized esophagus, stomach, and duodenum appeared normal.   Complications:  None.   Condition:  Stable   Recommendations  Diet:  Return to previous diet  PPI:  N/A  Anti-coagulants/platelets:  Continue prior to presentation anticoagulation.  Octreotide:  N/A  Discharge Planning:   Return to ED. Ok from GI perspective to discharge to home. Discussed with Ms. Alvarado prior to procedure - recommend discharging with a family member who can stay with her until her upcoming  visits this week.

## 2020-01-12 ENCOUNTER — HOSPITAL ENCOUNTER (OUTPATIENT)
Facility: CLINIC | Age: 30
Setting detail: OBSERVATION
Discharge: HOME | End: 2020-01-13
Attending: EMERGENCY MEDICINE | Admitting: FAMILY MEDICINE
Payer: COMMERCIAL

## 2020-01-12 ENCOUNTER — APPOINTMENT (OUTPATIENT)
Dept: GENERAL RADIOLOGY | Facility: CLINIC | Age: 30
End: 2020-01-12
Attending: EMERGENCY MEDICINE
Payer: COMMERCIAL

## 2020-01-12 DIAGNOSIS — T18.9XXA SWALLOWED FOREIGN BODY, INITIAL ENCOUNTER: ICD-10-CM

## 2020-01-12 PROCEDURE — C9113 INJ PANTOPRAZOLE SODIUM, VIA: HCPCS | Performed by: EMERGENCY MEDICINE

## 2020-01-12 PROCEDURE — 96374 THER/PROPH/DIAG INJ IV PUSH: CPT | Performed by: EMERGENCY MEDICINE

## 2020-01-12 PROCEDURE — 99285 EMERGENCY DEPT VISIT HI MDM: CPT | Mod: Z6 | Performed by: EMERGENCY MEDICINE

## 2020-01-12 PROCEDURE — 74018 RADEX ABDOMEN 1 VIEW: CPT

## 2020-01-12 PROCEDURE — 25000128 H RX IP 250 OP 636: Performed by: EMERGENCY MEDICINE

## 2020-01-12 PROCEDURE — 99285 EMERGENCY DEPT VISIT HI MDM: CPT | Mod: 25 | Performed by: EMERGENCY MEDICINE

## 2020-01-12 PROCEDURE — 71045 X-RAY EXAM CHEST 1 VIEW: CPT

## 2020-01-12 RX ORDER — SODIUM CHLORIDE 9 MG/ML
INJECTION, SOLUTION INTRAVENOUS CONTINUOUS
Status: DISCONTINUED | OUTPATIENT
Start: 2020-01-12 | End: 2020-01-13 | Stop reason: HOSPADM

## 2020-01-12 RX ORDER — LORAZEPAM 2 MG/ML
0.5 INJECTION INTRAMUSCULAR ONCE
Status: COMPLETED | OUTPATIENT
Start: 2020-01-12 | End: 2020-01-13

## 2020-01-12 RX ADMIN — PANTOPRAZOLE SODIUM 40 MG: 40 INJECTION, POWDER, FOR SOLUTION INTRAVENOUS at 22:35

## 2020-01-12 ASSESSMENT — MIFFLIN-ST. JEOR: SCORE: 1780.95

## 2020-01-12 ASSESSMENT — ENCOUNTER SYMPTOMS
VOMITING: 0
ABDOMINAL PAIN: 1

## 2020-01-13 ENCOUNTER — ANESTHESIA EVENT (OUTPATIENT)
Dept: SURGERY | Facility: CLINIC | Age: 30
End: 2020-01-13
Payer: COMMERCIAL

## 2020-01-13 ENCOUNTER — ANESTHESIA (OUTPATIENT)
Dept: SURGERY | Facility: CLINIC | Age: 30
End: 2020-01-13
Payer: COMMERCIAL

## 2020-01-13 VITALS
TEMPERATURE: 98 F | BODY MASS INDEX: 44.53 KG/M2 | WEIGHT: 242 LBS | DIASTOLIC BLOOD PRESSURE: 82 MMHG | OXYGEN SATURATION: 98 % | HEIGHT: 62 IN | SYSTOLIC BLOOD PRESSURE: 122 MMHG | RESPIRATION RATE: 16 BRPM | HEART RATE: 97 BPM

## 2020-01-13 PROBLEM — T18.9XXA SWALLOWED FOREIGN BODY, INITIAL ENCOUNTER: Status: ACTIVE | Noted: 2020-01-12

## 2020-01-13 LAB
ANION GAP SERPL CALCULATED.3IONS-SCNC: 6 MMOL/L (ref 3–14)
B-HCG SERPL-ACNC: <1 IU/L (ref 0–5)
BUN SERPL-MCNC: 10 MG/DL (ref 7–30)
CALCIUM SERPL-MCNC: 8 MG/DL (ref 8.5–10.1)
CHLORIDE SERPL-SCNC: 115 MMOL/L (ref 94–109)
CO2 SERPL-SCNC: 21 MMOL/L (ref 20–32)
CREAT SERPL-MCNC: 0.52 MG/DL (ref 0.52–1.04)
ERYTHROCYTE [DISTWIDTH] IN BLOOD BY AUTOMATED COUNT: 13.8 % (ref 10–15)
GFR SERPL CREATININE-BSD FRML MDRD: >90 ML/MIN/{1.73_M2}
GLUCOSE SERPL-MCNC: 109 MG/DL (ref 70–99)
HCT VFR BLD AUTO: 33 % (ref 35–47)
HGB BLD-MCNC: 10.5 G/DL (ref 11.7–15.7)
MCH RBC QN AUTO: 28.8 PG (ref 26.5–33)
MCHC RBC AUTO-ENTMCNC: 31.8 G/DL (ref 31.5–36.5)
MCV RBC AUTO: 91 FL (ref 78–100)
PLATELET # BLD AUTO: 236 10E9/L (ref 150–450)
POTASSIUM SERPL-SCNC: 3.4 MMOL/L (ref 3.4–5.3)
RADIOLOGIST FLAGS: ABNORMAL
RBC # BLD AUTO: 3.64 10E12/L (ref 3.8–5.2)
SODIUM SERPL-SCNC: 142 MMOL/L (ref 133–144)
WBC # BLD AUTO: 6.6 10E9/L (ref 4–11)

## 2020-01-13 PROCEDURE — 96361 HYDRATE IV INFUSION ADD-ON: CPT | Mod: 59 | Performed by: EMERGENCY MEDICINE

## 2020-01-13 PROCEDURE — 37000008 ZZH ANESTHESIA TECHNICAL FEE, 1ST 30 MIN: Performed by: INTERNAL MEDICINE

## 2020-01-13 PROCEDURE — 40000170 ZZH STATISTIC PRE-PROCEDURE ASSESSMENT II: Performed by: INTERNAL MEDICINE

## 2020-01-13 PROCEDURE — 96375 TX/PRO/DX INJ NEW DRUG ADDON: CPT | Mod: 59 | Performed by: EMERGENCY MEDICINE

## 2020-01-13 PROCEDURE — 27210794 ZZH OR GENERAL SUPPLY STERILE: Performed by: INTERNAL MEDICINE

## 2020-01-13 PROCEDURE — 85027 COMPLETE CBC AUTOMATED: CPT | Performed by: EMERGENCY MEDICINE

## 2020-01-13 PROCEDURE — 25000128 H RX IP 250 OP 636: Performed by: NURSE ANESTHETIST, CERTIFIED REGISTERED

## 2020-01-13 PROCEDURE — 80048 BASIC METABOLIC PNL TOTAL CA: CPT | Performed by: EMERGENCY MEDICINE

## 2020-01-13 PROCEDURE — 25800030 ZZH RX IP 258 OP 636: Performed by: NURSE ANESTHETIST, CERTIFIED REGISTERED

## 2020-01-13 PROCEDURE — 25000125 ZZHC RX 250: Performed by: INTERNAL MEDICINE

## 2020-01-13 PROCEDURE — 25000132 ZZH RX MED GY IP 250 OP 250 PS 637: Performed by: STUDENT IN AN ORGANIZED HEALTH CARE EDUCATION/TRAINING PROGRAM

## 2020-01-13 PROCEDURE — 36000053 ZZH SURGERY LEVEL 2 EA 15 ADDTL MIN - UMMC: Performed by: INTERNAL MEDICINE

## 2020-01-13 PROCEDURE — 36000051 ZZH SURGERY LEVEL 2 1ST 30 MIN - UMMC: Performed by: INTERNAL MEDICINE

## 2020-01-13 PROCEDURE — 25000125 ZZHC RX 250: Performed by: NURSE ANESTHETIST, CERTIFIED REGISTERED

## 2020-01-13 PROCEDURE — 25000566 ZZH SEVOFLURANE, EA 15 MIN: Performed by: INTERNAL MEDICINE

## 2020-01-13 PROCEDURE — 71000027 ZZH RECOVERY PHASE 2 EACH 15 MINS: Performed by: INTERNAL MEDICINE

## 2020-01-13 PROCEDURE — 71000014 ZZH RECOVERY PHASE 1 LEVEL 2 FIRST HR: Performed by: INTERNAL MEDICINE

## 2020-01-13 PROCEDURE — 25000128 H RX IP 250 OP 636: Performed by: ANESTHESIOLOGY

## 2020-01-13 PROCEDURE — 84702 CHORIONIC GONADOTROPIN TEST: CPT | Performed by: EMERGENCY MEDICINE

## 2020-01-13 PROCEDURE — 25800030 ZZH RX IP 258 OP 636: Performed by: EMERGENCY MEDICINE

## 2020-01-13 PROCEDURE — 84702 CHORIONIC GONADOTROPIN TEST: CPT | Performed by: ANESTHESIOLOGY

## 2020-01-13 PROCEDURE — 25000128 H RX IP 250 OP 636: Performed by: EMERGENCY MEDICINE

## 2020-01-13 PROCEDURE — 37000009 ZZH ANESTHESIA TECHNICAL FEE, EACH ADDTL 15 MIN: Performed by: INTERNAL MEDICINE

## 2020-01-13 PROCEDURE — 71000015 ZZH RECOVERY PHASE 1 LEVEL 2 EA ADDTL HR: Performed by: INTERNAL MEDICINE

## 2020-01-13 PROCEDURE — G0378 HOSPITAL OBSERVATION PER HR: HCPCS

## 2020-01-13 RX ORDER — HEPARIN SODIUM (PORCINE) LOCK FLUSH IV SOLN 100 UNIT/ML 100 UNIT/ML
5 SOLUTION INTRAVENOUS ONCE
Status: COMPLETED | OUTPATIENT
Start: 2020-01-13 | End: 2020-01-13

## 2020-01-13 RX ORDER — ACETAMINOPHEN 325 MG/1
650 TABLET ORAL EVERY 4 HOURS PRN
Status: DISCONTINUED | OUTPATIENT
Start: 2020-01-13 | End: 2020-01-13 | Stop reason: HOSPADM

## 2020-01-13 RX ORDER — FENTANYL CITRATE 50 UG/ML
INJECTION, SOLUTION INTRAMUSCULAR; INTRAVENOUS PRN
Status: DISCONTINUED | OUTPATIENT
Start: 2020-01-13 | End: 2020-01-13

## 2020-01-13 RX ORDER — ONDANSETRON 4 MG/1
4 TABLET, ORALLY DISINTEGRATING ORAL EVERY 30 MIN PRN
Status: DISCONTINUED | OUTPATIENT
Start: 2020-01-13 | End: 2020-01-13 | Stop reason: HOSPADM

## 2020-01-13 RX ORDER — SODIUM CHLORIDE, SODIUM LACTATE, POTASSIUM CHLORIDE, CALCIUM CHLORIDE 600; 310; 30; 20 MG/100ML; MG/100ML; MG/100ML; MG/100ML
INJECTION, SOLUTION INTRAVENOUS CONTINUOUS PRN
Status: DISCONTINUED | OUTPATIENT
Start: 2020-01-13 | End: 2020-01-13

## 2020-01-13 RX ORDER — DIPHENHYDRAMINE HYDROCHLORIDE 50 MG/ML
25 INJECTION INTRAMUSCULAR; INTRAVENOUS ONCE
Status: COMPLETED | OUTPATIENT
Start: 2020-01-13 | End: 2020-01-13

## 2020-01-13 RX ORDER — ACETAMINOPHEN 650 MG/1
650 SUPPOSITORY RECTAL EVERY 4 HOURS PRN
Status: DISCONTINUED | OUTPATIENT
Start: 2020-01-13 | End: 2020-01-13 | Stop reason: HOSPADM

## 2020-01-13 RX ORDER — POLYETHYLENE GLYCOL 3350 17 G/17G
17 POWDER, FOR SOLUTION ORAL DAILY PRN
Status: DISCONTINUED | OUTPATIENT
Start: 2020-01-13 | End: 2020-01-13 | Stop reason: HOSPADM

## 2020-01-13 RX ORDER — HYDROXYZINE HYDROCHLORIDE 25 MG/1
25 TABLET, FILM COATED ORAL 3 TIMES DAILY PRN
Status: DISCONTINUED | OUTPATIENT
Start: 2020-01-13 | End: 2020-01-13 | Stop reason: HOSPADM

## 2020-01-13 RX ORDER — HYDROMORPHONE HYDROCHLORIDE 1 MG/ML
.3-.5 INJECTION, SOLUTION INTRAMUSCULAR; INTRAVENOUS; SUBCUTANEOUS EVERY 10 MIN PRN
Status: DISCONTINUED | OUTPATIENT
Start: 2020-01-13 | End: 2020-01-13 | Stop reason: HOSPADM

## 2020-01-13 RX ORDER — TOPIRAMATE 25 MG/1
100 TABLET, FILM COATED ORAL DAILY
Status: DISCONTINUED | OUTPATIENT
Start: 2020-01-13 | End: 2020-01-13 | Stop reason: HOSPADM

## 2020-01-13 RX ORDER — NALOXONE HYDROCHLORIDE 0.4 MG/ML
.1-.4 INJECTION, SOLUTION INTRAMUSCULAR; INTRAVENOUS; SUBCUTANEOUS
Status: CANCELLED | OUTPATIENT
Start: 2020-01-13 | End: 2020-01-14

## 2020-01-13 RX ORDER — ONDANSETRON 2 MG/ML
4 INJECTION INTRAMUSCULAR; INTRAVENOUS EVERY 6 HOURS PRN
Status: DISCONTINUED | OUTPATIENT
Start: 2020-01-13 | End: 2020-01-13 | Stop reason: HOSPADM

## 2020-01-13 RX ORDER — DESVENLAFAXINE 50 MG/1
100 TABLET, FILM COATED, EXTENDED RELEASE ORAL DAILY
Status: DISCONTINUED | OUTPATIENT
Start: 2020-01-13 | End: 2020-01-13 | Stop reason: HOSPADM

## 2020-01-13 RX ORDER — BUSPIRONE HYDROCHLORIDE 10 MG/1
10 TABLET ORAL 2 TIMES DAILY
Status: DISCONTINUED | OUTPATIENT
Start: 2020-01-13 | End: 2020-01-13 | Stop reason: HOSPADM

## 2020-01-13 RX ORDER — HYDRALAZINE HYDROCHLORIDE 20 MG/ML
2.5-5 INJECTION INTRAMUSCULAR; INTRAVENOUS EVERY 10 MIN PRN
Status: DISCONTINUED | OUTPATIENT
Start: 2020-01-13 | End: 2020-01-13 | Stop reason: HOSPADM

## 2020-01-13 RX ORDER — ONDANSETRON 2 MG/ML
4 INJECTION INTRAMUSCULAR; INTRAVENOUS EVERY 30 MIN PRN
Status: DISCONTINUED | OUTPATIENT
Start: 2020-01-13 | End: 2020-01-13 | Stop reason: HOSPADM

## 2020-01-13 RX ORDER — ALBUTEROL SULFATE 0.83 MG/ML
2.5 SOLUTION RESPIRATORY (INHALATION) EVERY 4 HOURS PRN
Status: DISCONTINUED | OUTPATIENT
Start: 2020-01-13 | End: 2020-01-13 | Stop reason: HOSPADM

## 2020-01-13 RX ORDER — ONDANSETRON 2 MG/ML
INJECTION INTRAMUSCULAR; INTRAVENOUS PRN
Status: DISCONTINUED | OUTPATIENT
Start: 2020-01-13 | End: 2020-01-13

## 2020-01-13 RX ORDER — GABAPENTIN 600 MG/1
600 TABLET ORAL 3 TIMES DAILY
Status: DISCONTINUED | OUTPATIENT
Start: 2020-01-13 | End: 2020-01-13 | Stop reason: HOSPADM

## 2020-01-13 RX ORDER — MEPERIDINE HYDROCHLORIDE 25 MG/ML
12.5 INJECTION INTRAMUSCULAR; INTRAVENOUS; SUBCUTANEOUS
Status: DISCONTINUED | OUTPATIENT
Start: 2020-01-13 | End: 2020-01-13 | Stop reason: HOSPADM

## 2020-01-13 RX ORDER — LIDOCAINE 40 MG/G
CREAM TOPICAL
Status: DISCONTINUED | OUTPATIENT
Start: 2020-01-13 | End: 2020-01-13 | Stop reason: HOSPADM

## 2020-01-13 RX ORDER — FLUMAZENIL 0.1 MG/ML
0.2 INJECTION, SOLUTION INTRAVENOUS
Status: CANCELLED | OUTPATIENT
Start: 2020-01-13 | End: 2020-01-13

## 2020-01-13 RX ORDER — SODIUM CHLORIDE, SODIUM LACTATE, POTASSIUM CHLORIDE, CALCIUM CHLORIDE 600; 310; 30; 20 MG/100ML; MG/100ML; MG/100ML; MG/100ML
INJECTION, SOLUTION INTRAVENOUS CONTINUOUS
Status: DISCONTINUED | OUTPATIENT
Start: 2020-01-13 | End: 2020-01-13 | Stop reason: HOSPADM

## 2020-01-13 RX ORDER — DEXAMETHASONE SODIUM PHOSPHATE 4 MG/ML
INJECTION, SOLUTION INTRA-ARTICULAR; INTRALESIONAL; INTRAMUSCULAR; INTRAVENOUS; SOFT TISSUE PRN
Status: DISCONTINUED | OUTPATIENT
Start: 2020-01-13 | End: 2020-01-13

## 2020-01-13 RX ORDER — CLONIDINE HYDROCHLORIDE 0.1 MG/1
0.1 TABLET ORAL 2 TIMES DAILY
Status: DISCONTINUED | OUTPATIENT
Start: 2020-01-13 | End: 2020-01-13 | Stop reason: HOSPADM

## 2020-01-13 RX ORDER — LIDOCAINE HYDROCHLORIDE 20 MG/ML
INJECTION, SOLUTION INFILTRATION; PERINEURAL PRN
Status: DISCONTINUED | OUTPATIENT
Start: 2020-01-13 | End: 2020-01-13

## 2020-01-13 RX ORDER — NALOXONE HYDROCHLORIDE 0.4 MG/ML
.1-.4 INJECTION, SOLUTION INTRAMUSCULAR; INTRAVENOUS; SUBCUTANEOUS
Status: DISCONTINUED | OUTPATIENT
Start: 2020-01-13 | End: 2020-01-13 | Stop reason: HOSPADM

## 2020-01-13 RX ORDER — METOPROLOL TARTRATE 1 MG/ML
1-2 INJECTION, SOLUTION INTRAVENOUS EVERY 5 MIN PRN
Status: DISCONTINUED | OUTPATIENT
Start: 2020-01-13 | End: 2020-01-13 | Stop reason: HOSPADM

## 2020-01-13 RX ORDER — ONDANSETRON 4 MG/1
4 TABLET, ORALLY DISINTEGRATING ORAL EVERY 6 HOURS PRN
Status: DISCONTINUED | OUTPATIENT
Start: 2020-01-13 | End: 2020-01-13 | Stop reason: HOSPADM

## 2020-01-13 RX ORDER — PROPOFOL 10 MG/ML
INJECTION, EMULSION INTRAVENOUS PRN
Status: DISCONTINUED | OUTPATIENT
Start: 2020-01-13 | End: 2020-01-13

## 2020-01-13 RX ORDER — FENTANYL CITRATE 50 UG/ML
25-50 INJECTION, SOLUTION INTRAMUSCULAR; INTRAVENOUS
Status: DISCONTINUED | OUTPATIENT
Start: 2020-01-13 | End: 2020-01-13 | Stop reason: HOSPADM

## 2020-01-13 RX ORDER — DIMENHYDRINATE 50 MG/ML
25 INJECTION, SOLUTION INTRAMUSCULAR; INTRAVENOUS
Status: DISCONTINUED | OUTPATIENT
Start: 2020-01-13 | End: 2020-01-13 | Stop reason: HOSPADM

## 2020-01-13 RX ORDER — FENTANYL CITRATE 50 UG/ML
25-50 INJECTION, SOLUTION INTRAMUSCULAR; INTRAVENOUS
Status: CANCELLED | OUTPATIENT
Start: 2020-01-13

## 2020-01-13 RX ADMIN — FENTANYL CITRATE 50 MCG: 50 INJECTION, SOLUTION INTRAMUSCULAR; INTRAVENOUS at 10:36

## 2020-01-13 RX ADMIN — FENTANYL CITRATE 50 MCG: 50 INJECTION, SOLUTION INTRAMUSCULAR; INTRAVENOUS at 10:59

## 2020-01-13 RX ADMIN — PROPOFOL 120 MG: 10 INJECTION, EMULSION INTRAVENOUS at 09:15

## 2020-01-13 RX ADMIN — SUGAMMADEX 200 MG: 100 INJECTION, SOLUTION INTRAVENOUS at 10:04

## 2020-01-13 RX ADMIN — DEXAMETHASONE SODIUM PHOSPHATE 6 MG: 4 INJECTION, SOLUTION INTRA-ARTICULAR; INTRALESIONAL; INTRAMUSCULAR; INTRAVENOUS; SOFT TISSUE at 09:40

## 2020-01-13 RX ADMIN — SODIUM CHLORIDE, POTASSIUM CHLORIDE, SODIUM LACTATE AND CALCIUM CHLORIDE: 600; 310; 30; 20 INJECTION, SOLUTION INTRAVENOUS at 08:50

## 2020-01-13 RX ADMIN — FENTANYL CITRATE 50 MCG: 50 INJECTION, SOLUTION INTRAMUSCULAR; INTRAVENOUS at 09:10

## 2020-01-13 RX ADMIN — ONDANSETRON 4 MG: 2 INJECTION INTRAMUSCULAR; INTRAVENOUS at 09:45

## 2020-01-13 RX ADMIN — LIDOCAINE HYDROCHLORIDE 100 MG: 20 INJECTION, SOLUTION INFILTRATION; PERINEURAL at 09:15

## 2020-01-13 RX ADMIN — LORAZEPAM 0.5 MG: 2 INJECTION INTRAMUSCULAR; INTRAVENOUS at 00:05

## 2020-01-13 RX ADMIN — MIDAZOLAM 2 MG: 1 INJECTION INTRAMUSCULAR; INTRAVENOUS at 09:02

## 2020-01-13 RX ADMIN — DIPHENHYDRAMINE HYDROCHLORIDE 25 MG: 50 INJECTION, SOLUTION INTRAMUSCULAR; INTRAVENOUS at 11:23

## 2020-01-13 RX ADMIN — DIPHENHYDRAMINE HYDROCHLORIDE 25 MG: 50 INJECTION, SOLUTION INTRAMUSCULAR; INTRAVENOUS at 11:09

## 2020-01-13 RX ADMIN — HEPARIN SODIUM (PORCINE) LOCK FLUSH IV SOLN 100 UNIT/ML 5 ML: 100 SOLUTION at 14:14

## 2020-01-13 RX ADMIN — ACETAMINOPHEN 650 MG: 325 TABLET, FILM COATED ORAL at 04:18

## 2020-01-13 RX ADMIN — ROCURONIUM BROMIDE 50 MG: 10 INJECTION INTRAVENOUS at 09:15

## 2020-01-13 RX ADMIN — HYDROMORPHONE HYDROCHLORIDE 0.5 MG: 1 INJECTION, SOLUTION INTRAMUSCULAR; INTRAVENOUS; SUBCUTANEOUS at 11:15

## 2020-01-13 RX ADMIN — FENTANYL CITRATE 50 MCG: 50 INJECTION, SOLUTION INTRAMUSCULAR; INTRAVENOUS at 09:12

## 2020-01-13 RX ADMIN — FENTANYL CITRATE 50 MCG: 50 INJECTION, SOLUTION INTRAMUSCULAR; INTRAVENOUS at 10:25

## 2020-01-13 RX ADMIN — SODIUM CHLORIDE: 9 INJECTION, SOLUTION INTRAVENOUS at 00:05

## 2020-01-13 RX ADMIN — PHENYLEPHRINE HYDROCHLORIDE 50 MCG: 10 INJECTION INTRAVENOUS at 09:30

## 2020-01-13 RX ADMIN — PHENYLEPHRINE HYDROCHLORIDE 50 MCG: 10 INJECTION INTRAVENOUS at 09:22

## 2020-01-13 RX ADMIN — FENTANYL CITRATE 50 MCG: 50 INJECTION, SOLUTION INTRAMUSCULAR; INTRAVENOUS at 10:49

## 2020-01-13 ASSESSMENT — ENCOUNTER SYMPTOMS
CHILLS: 0
SHORTNESS OF BREATH: 0
ABDOMINAL PAIN: 0
FEVER: 0

## 2020-01-13 NOTE — PROCEDURES
Gastroenterology Endoscopy Suite Brief Operative Note    Procedure:  Upper endoscopy   Post-operative diagnosis:  Foreign body remocal   Staff Physician:  Dr. Lokesh Paula   Fellow/Assistant(s):  Dr. Almazan   Specimens:  None (foreign body not sent to path)   Findings:  Foreign body - pen spring, in upper GI tract - removed.   Complications:  None.   Condition:  Stable   Recommendations  Diet:  Return to previous diet  PPI:  PPI po BID  Anti-coagulants/platelets:  Restart anticoagulation now  Octreotide:  N/A  Discharge Planning:   Per primary team - no further GI work-up planned.

## 2020-01-13 NOTE — DISCHARGE INSTRUCTIONS
[facility]  Same-Day Surgery   Adult Discharge Orders & Instructions     For 24 hours after surgery    1. Get plenty of rest.  A responsible adult must stay with you for at least 24 hours after you leave the hospital.   2. Do not drive or use heavy equipment.  If you have weakness or tingling, don't drive or use heavy equipment until this feeling goes away.  3. Do not drink alcohol.  4. Avoid strenuous or risky activities.  Ask for help when climbing stairs.   5. You may feel lightheaded.  IF so, sit for a few minutes before standing.  Have someone help you get up.   6. If you have nausea (feel sick to your stomach): Drink only clear liquids such as apple juice, ginger ale, broth or 7-Up.  Rest may also help.  Be sure to drink enough fluids.  Move to a regular diet as you feel able.  7. You may have a slight fever. Call the doctor if your fever is over 100 F (37.7 C) (taken under the tongue) or lasts longer than 24 hours.  8. You may have a dry mouth, a sore throat, muscle aches or trouble sleeping.  These should go away after 24 hours.  9. Do not make important or legal decisions.   Call your doctor for any of the followin.  Signs of infection (fever, growing tenderness at the surgery site, a large amount of drainage or bleeding, severe pain, foul-smelling drainage, redness, swelling).    2. It has been over 8 to 10 hours since surgery and you are still not able to urinate (pass water).    3.  Headache for over 24 hours.    To contact a doctor, call Dr. Paula's clinic at [phone] or:     X [phone] and ask for the resident on call for Gastroenterology (answered 24 hours a day)   X Emergency Department: [facility]: [phone]       (TTY for hearing impaired: [phone])

## 2020-01-13 NOTE — OR NURSING
Pt states that she is very itchy, Scott CASANOVA notified and verbalized an order for 25 mg of benadryl and to repeat if itching persists.

## 2020-01-13 NOTE — ANESTHESIA PREPROCEDURE EVALUATION
Anesthesia Pre-Procedure Evaluation    Patient: Nevin Alvarado   MRN:     [id number] Gender:   female   Age:    29 year old :      1990        Preoperative Diagnosis: * No pre-op diagnosis entered *   Procedure(s):  ESOPHAGOGASTRODUOENOSCOPY WITH FOREIGN BODY REMOVAL     Past Medical History:   Diagnosis Date     ADD (attention deficit disorder)      Anorexia nervosa with bulimia     history of; on Topamax     Anxiety      Borderline personality disorder (H)      Depression      Depressive disorder      H/O self-harm      Lives in independent group home     due to debilitating mental illness     Migraine without aura     no known triggers; on Topamax bid and Imitrex PRN     Morbid obesity (H)      PTSD (post-traumatic stress disorder)      Rectal foreign body - Recurrent issue, self placed      Swallowed foreign body - Recurrent issue, self placed       Past Surgical History:   Procedure Laterality Date     COMBINED ESOPHAGOSCOPY, GASTROSCOPY, DUODENOSCOPY (EGD), REPLACE ESOPHAGEAL STENT N/A 10/9/2019    Procedure: Upper Endoscopy with Suture Placement;  Surgeon: Zurdo Ramirez MD;  [facility]: [facility]     ESOPHAGOSCOPY, GASTROSCOPY, DUODENOSCOPY (EGD), COMBINED N/A 3/9/2017    Procedure: COMBINED ESOPHAGOSCOPY, GASTROSCOPY, DUODENOSCOPY (EGD), REMOVE FOREIGN BODY;  Surgeon: Avis Guzmán MD;  Location: [facility]     ESOPHAGOSCOPY, GASTROSCOPY, DUODENOSCOPY (EGD), COMBINED N/A 2017    Procedure: COMBINED ESOPHAGOSCOPY, GASTROSCOPY, DUODENOSCOPY (EGD), REMOVE FOREIGN BODY;  EGD removal Foregin body;  Surgeon: Lokesh Paula MD;  Location: [facility]     ESOPHAGOSCOPY, GASTROSCOPY, DUODENOSCOPY (EGD), COMBINED N/A 2017    Procedure: COMBINED ESOPHAGOSCOPY, GASTROSCOPY, DUODENOSCOPY (EGD);  COMBINED ESOPHAGOSCOPY, GASTROSCOPY, DUODENOSCOPY (EGD) [[id number]]attempted removal of foreign body;  Surgeon: Pamela Perez MD;  Location: [facility]     ESOPHAGOSCOPY,  GASTROSCOPY, DUODENOSCOPY (EGD), COMBINED N/A 6/9/2017    Procedure: COMBINED ESOPHAGOSCOPY, GASTROSCOPY, DUODENOSCOPY (EGD), REMOVE FOREIGN BODY;  Esophagoscopy, Gastroscopy, Duodenoscopy, Removal of Foreign Body;  Surgeon: Dejon Marsh MD;  Location: [facility]     ESOPHAGOSCOPY, GASTROSCOPY, DUODENOSCOPY (EGD), COMBINED N/A 1/6/2018    Procedure: COMBINED ESOPHAGOSCOPY, GASTROSCOPY, DUODENOSCOPY (EGD), REMOVE FOREIGN BODY;  COMBINED ESOPHAGOSCOPY, GASTROSCOPY, DUODENOSCOPY (EGD) [by pascal net and snare with profol sedation;  Surgeon: Feliciano Emmanuel MD;  Location:  [facility]     ESOPHAGOSCOPY, GASTROSCOPY, DUODENOSCOPY (EGD), COMBINED N/A 3/19/2018    Procedure: COMBINED ESOPHAGOSCOPY, GASTROSCOPY, DUODENOSCOPY (EGD), REMOVE FOREIGN BODY;   Esophagodscopy, Gastroscopy, Duodenoscopy,Foreign Body Removal;  Surgeon: Brice Guzmán MD;  [facility]: [facility]     ESOPHAGOSCOPY, GASTROSCOPY, DUODENOSCOPY (EGD), COMBINED N/A 4/16/2018    Procedure: COMBINED ESOPHAGOSCOPY, GASTROSCOPY, DUODENOSCOPY (EGD), REMOVE FOREIGN BODY;  Esophagogastroduodenoscopy  Foreign Body Removal X 2;  Surgeon: Royer Moise MD;  [facility]: [facility]     ESOPHAGOSCOPY, GASTROSCOPY, DUODENOSCOPY (EGD), COMBINED N/A 6/1/2018    Procedure: COMBINED ESOPHAGOSCOPY, GASTROSCOPY, DUODENOSCOPY (EGD), REMOVE FOREIGN BODY;  COMBINED ESOPHAGOSCOPY, GASTROSCOPY, DUODENOSCOPY with removal of foreign body, propofol sedation by anesthesia;  Surgeon: Brice Martinez MD;  Location:  GI     ESOPHAGOSCOPY, GASTROSCOPY, DUODENOSCOPY (EGD), COMBINED N/A 7/25/2018    Procedure: COMBINED ESOPHAGOSCOPY, GASTROSCOPY, DUODENOSCOPY (EGD), REMOVE FOREIGN BODY;;  Surgeon: Candy Castelan MD;  Location:  GI     ESOPHAGOSCOPY, GASTROSCOPY, DUODENOSCOPY (EGD), COMBINED N/A 7/28/2018    Procedure: COMBINED ESOPHAGOSCOPY, GASTROSCOPY, DUODENOSCOPY (EGD), REMOVE FOREIGN BODY;  COMBINED ESOPHAGOSCOPY, GASTROSCOPY, DUODENOSCOPY (EGD), REMOVE FOREIGN  BODY;  Surgeon: Brice Guzmán MD;  [facility]     ESOPHAGOSCOPY, GASTROSCOPY, DUODENOSCOPY (EGD), COMBINED N/A 7/31/2018    Procedure: COMBINED ESOPHAGOSCOPY, GASTROSCOPY, DUODENOSCOPY (EGD);  COMBINED ESOPHAGOSCOPY, GASTROSCOPY, DUODENOSCOPY (EGD) TO REMOVE FOREIGN BODY;  Surgeon: Lokesh Paula MD;  [facility]: [facility]     ESOPHAGOSCOPY, GASTROSCOPY, DUODENOSCOPY (EGD), COMBINED N/A 8/4/2018    Procedure: COMBINED ESOPHAGOSCOPY, GASTROSCOPY, DUODENOSCOPY (EGD), REMOVE FOREIGN BODY;   combined esophagoscopy, gastroscopy, duodenoscopy, REMOVE FOREIGN BODY ;  Surgeon: Lokesh Paula MD;  [facility]: [facility]     ESOPHAGOSCOPY, GASTROSCOPY, DUODENOSCOPY (EGD), COMBINED N/A 10/6/2019    Procedure: ESOPHAGOGASTRODUODENOSCOPY (EGD) with fireign body removal x2, esophageal stent placement with floroscopy;  Surgeon: Timoteo Espana MD;  Location: [facility]     ESOPHAGOSCOPY, GASTROSCOPY, DUODENOSCOPY (EGD), COMBINED N/A 12/2/2019    Procedure: Esophagogastroduodenoscopy with esophageal stent removal, esophogram;  Surgeon: Kailee Tena MD;  [facility]: [facility]     ESOPHAGOSCOPY, GASTROSCOPY, DUODENOSCOPY (EGD), COMBINED N/A 12/17/2019    Procedure: ESOPHAGOGASTRODUODENOSCOPY, WITH FOREIGN BODY REMOVAL;  Surgeon: Pamela Perez MD;  [facility]     ESOPHAGOSCOPY, GASTROSCOPY, DUODENOSCOPY (EGD), COMBINED N/A 12/13/2019    Procedure: ESOPHAGOGASTRODUODENOSCOPY, WITH FOREIGN BODY REMOVAL;  Surgeon: Samia Stanton MD;  [facility]     ESOPHAGOSCOPY, GASTROSCOPY, DUODENOSCOPY (EGD), COMBINED N/A 12/28/2019    Procedure: ESOPHAGOGASTRODUODENOSCOPY (EGD) Removal of Foreign Body X 2;  Surgeon: Huy Kelley MD;  [facility]     ESOPHAGOSCOPY, GASTROSCOPY, DUODENOSCOPY (EGD), COMBINED N/A 1/5/2020    Procedure: ESOPHAGOGASTRODUOENOSCOPY WITH FOREIGN BODY REMOVAL;  Surgeon: Pamela Perez MD;  [facility]     ESOPHAGOSCOPY, GASTROSCOPY, DUODENOSCOPY (EGD), COMBINED  N/A 1/3/2020    Procedure: ESOPHAGOGASTRODUODENOSCOPY (EGD) REMOVAL OF FOREIGN BODY.;  Surgeon: Pamela Perez MD;  [facility]     EXAM UNDER ANESTHESIA ANUS N/A 1/10/2017    Procedure: EXAM UNDER ANESTHESIA ANUS;  Surgeon: Annmarie Haynes MD;  [facility]     EXAM UNDER ANESTHESIA RECTUM N/A 7/19/2018    Procedure: EXAM UNDER ANESTHESIA RECTUM;  EXAM UNDER ANESTHESIA, REMOVAL OF RECTAL FOREIGN BODY;  Surgeon: Annmarie Haynes MD;  Location: [facility]     HC REMOVE FECAL IMPACTION OR FB W ANESTHESIA N/A 12/18/2016    Procedure: REMOVE FECAL IMPACTION/FOREIGN BODY UNDER ANESTHESIA;  Surgeon: Soham Cano MD;  Location: [facility]     KNEE SURGERY - removed a small tissue mass from knee Right      LAPAROSCOPIC ABLATION ENDOMETRIOSIS       LAPAROTOMY EXPLORATORY N/A 1/10/2017    Procedure: LAPAROTOMY EXPLORATORY;  Surgeon: Annmarie Haynes MD;  Location: [facility]     LAPAROTOMY EXPLORATORY  09/11/2019    Manual manipulation of bowel to remove pill bottle in rectum     lymph nodes removed from neck; benign  age 7     MAMMOPLASTY REDUCTION Bilateral      RELEASE CARPAL TUNNEL Bilateral      SIGMOIDOSCOPY FLEXIBLE N/A 1/10/2017    Procedure: SIGMOIDOSCOPY FLEXIBLE;  Surgeon: Annmarie Haynes MD;  Location: [facility]     SIGMOIDOSCOPY FLEXIBLE N/A 5/8/2018    Procedure: SIGMOIDOSCOPY FLEXIBLE;  flex sig with foreign body removal using snare and rattooth forcep;  Surgeon: Soham Cano MD;  Location:  GI     SIGMOIDOSCOPY FLEXIBLE N/A 2/20/2019    Procedure: Exam under anesthesia Colonoscopy with attempted  removal of rectal foreign body;  Surgeon: Estrada Chávez MD;  Location: [facility]          Anesthesia Evaluation     . Pt has had prior anesthetic. Type: General and MAC    No history of anesthetic complications          ROS/MED HX    ENT/Pulmonary:  - neg pulmonary ROS   (+)ZOHRA risk factors obese, , . .    Neurologic:     (+)neuropathy migraines,     Cardiovascular:      (+) ----. : . . fainting (syncope). :. .       METS/Exercise Tolerance:  >4 METS   Hematologic:  - neg hematologic  ROS   (+) History of blood clots (PE 11/2019) pt is anticoagulated, Anemia (Hb=10.5), Other Hematologic Disorder-red cell antibody       Musculoskeletal:  - neg musculoskeletal ROS       GI/Hepatic: Comment: Hx multiple foreign body ingestions/  insertions  esophogeal perforation, stenting 10/9/2019  Stent removed 12/03  Now swallowed pen spring     (+) GERD       Renal/Genitourinary:  - ROS Renal section negative       Endo:  - neg endo ROS   (+) Obesity (BMI=44), .      Psychiatric: Comment: PTSD, Intermittent suicidal ideation  ADHD    (+) psychiatric history anxiety, depression and bipolar      Infectious Disease:  - neg infectious disease ROS       Malignancy:      - no malignancy   Other:    (+) No chance of pregnancy C-spine cleared: N/A, no H/O Chronic Pain,no other significant disability   - neg other ROS                     PHYSICAL EXAM:   Mental Status/Neuro: A/A/O   Airway: Facies: Feasible  Mallampati: II  Mouth/Opening: Full  TM distance: > 6 cm  Neck ROM: Full   Respiratory: Auscultation: CTAB     Resp. Rate: Normal     Resp. Effort: Normal      CV: Rhythm: Regular  Rate: Age appropriate  Heart: Normal Sounds  Edema: None   Comments:       Habitus: Obesity; Morbid               LABS:  CBC:   Lab Results   Component Value Date    WBC 9.5 01/01/2020    WBC 10.0 12/28/2019    HGB 11.9 01/01/2020    HGB 11.6 (L) 12/28/2019    HCT 36.5 01/01/2020    HCT 36.4 12/28/2019     01/01/2020     12/28/2019     BMP:   Lab Results   Component Value Date     12/28/2019     12/17/2019    POTASSIUM 3.6 12/28/2019    POTASSIUM 3.6 12/17/2019    CHLORIDE 114 (H) 12/28/2019    CHLORIDE 112 (H) 12/17/2019    CO2 24 12/28/2019    CO2 25 12/17/2019    BUN 6 (L) 12/28/2019    BUN 10 12/17/2019    CR 0.48 (L) 12/28/2019    CR 0.53 12/17/2019     (H) 12/28/2019    GLC 94  "12/17/2019     COAGS:   Lab Results   Component Value Date    PTT 51 (H) 12/28/2019    INR 1.42 (H) 12/28/2019     POC:   Lab Results   Component Value Date    BGM 99 11/03/2019    HCG Negative 12/02/2019    HCGS Negative 11/28/2019     OTHER:   Lab Results   Component Value Date    LACT 1.0 11/28/2019    KELBY 8.5 12/28/2019    PHOS 3.5 12/01/2019    MAG 1.9 12/05/2019    ALBUMIN 3.1 (L) 12/09/2019    PROTTOTAL 6.2 (L) 12/09/2019    ALT 24 12/09/2019    AST 12 12/09/2019    ALKPHOS 73 12/09/2019    BILITOTAL 0.2 12/09/2019    LIPASE 40 (L) 12/04/2019    TSH 0.66 03/21/2018    T4 0.96 05/16/2017    CRP 6.6 12/09/2019    SED 26 (H) 07/11/2017        Preop Vitals    BP Readings from Last 3 Encounters:   01/12/20 (!) 147/94   01/05/20 132/88   01/04/20 (!) 145/87    Pulse Readings from Last 3 Encounters:   01/12/20 102   01/05/20 83   01/01/20 80      Resp Readings from Last 3 Encounters:   01/12/20 14   01/05/20 16   01/04/20 14    SpO2 Readings from Last 3 Encounters:   01/12/20 100%   01/05/20 99%   01/04/20 94%      Temp Readings from Last 1 Encounters:   01/05/20 36.7  C (98.1  F) (Oral)    Ht Readings from Last 1 Encounters:   01/12/20 1.575 m (5' 2\")      Wt Readings from Last 1 Encounters:   01/12/20 109.8 kg (242 lb)    Estimated body mass index is 44.26 kg/m  as calculated from the following:    Height as of 1/12/20: 1.575 m (5' 2\").    Weight as of 1/12/20: 109.8 kg (242 lb).     LDA:  Port A Cath Single 12/17/19 Right Chest wall (Active)   Access Attempts 1 1/12/2020 10:29 PM   Gauge Power noncoring 90 degree bend;20 gauge;3/4 inch 1/12/2020 10:29 PM   Site Assessment WDL 1/12/2020 10:29 PM   Number of days: 27        Assessment:   ASA SCORE: 3 emergent   H&P: History and physical reviewed and following examination; no interval change.   Smoking Status:  Non-Smoker/Unknown   NPO Status: NPO Appropriate     Plan:   Anes. Type:  General   Pre-Medication: Midazolam   Induction:  IV (Standard)   Airway: ETT; Oral "   Access/Monitoring: PIV   Maintenance: Balanced     Postop Plan:   Postop Pain: Opioids  Postop Sedation/Airway: Not planned  Disposition: Inpatient/Admit     PONV Management: Adult Risk Factors: Female   Prevention: Ondansetron, Dexamethasone     CONSENT: Direct conversation   Plan and risks discussed with: Patient   Blood Products: Consent Deferred (Minimal Blood Loss)       Comments for Plan/Consent:  01/05/20; 1842; Mask Ventilation: Not attempted (RSI); Ease of Intubation: Easy; Airway Size: 7;  Cuffed;  Oral;  Blade Type: C-Mac;  Blade Size: 3;  Place by: Siddharth ;  Insertion Attempts: 2 (Required ETT to be curved to pass the VC even with Gr 1 view of VC);  Secured at (cm)to lip: 23 cm;  Breath Sounds: Equal, clear and bilateral;  End Tidal CO2: Present;  Dentition: Intact, Unchanged;  Grade View of Cords: 1 (Gr1 with VL);  Airway Adjuncts:  C-Mac                     Mercedes Sanders MD

## 2020-01-13 NOTE — ANESTHESIA POSTPROCEDURE EVALUATION
Anesthesia POST Procedure Evaluation    Patient: Nevin Alvarado   MRN:     [id number] Gender:   female   Age:    29 year old :      1990        Preoperative Diagnosis: Swallowed foreign body, initial encounter [T18.9XXA]   Procedure(s):  ESOPHAGOGASTRODUODENOSCOPY (EGD) for foreign body removal   Postop Comments: No value filed.       Anesthesia Type:  Not documented  General    Reportable Event: NO     PAIN: Uncomplicated   Sign Out status: Comfortable, Well controlled pain     PONV: No PONV   Sign Out status:  No Nausea or Vomiting     Neuro/Psych: Uneventful perioperative course   Sign Out Status: Preoperative baseline; Age appropriate mentation     Airway/Resp.: Uneventful perioperative course   Sign Out Status: Non labored breathing, age appropriate RR; Resp. Status within EXPECTED Parameters     CV: Uneventful perioperative course   Sign Out status: Appropriate BP and perfusion indices; Appropriate HR/Rhythm     Disposition:   Sign Out in:  PACU  Disposition:  Phase II; Home  Recovery Course: Uneventful  Follow-Up: Not required           Last Anesthesia Record Vitals:  CRNA VITALS  2020 0944 - 2020 1044      2020             Pulse:  98    SpO2:  97 %    Resp Rate (observed):  (!) 6          Last PACU Vitals:  Vitals Value Taken Time   /89 2020 11:40 AM   Temp 36.8  C (98.3  F) 2020 11:30 AM   Pulse 93 2020 11:40 AM   Resp 20 2020 11:30 AM   SpO2 96 % 2020 11:43 AM   Temp src     NIBP     Pulse     SpO2     Resp     Temp     Ht Rate     Temp 2     Vitals shown include unvalidated device data.      Electronically Signed By: Mercedes Sanders MD, 2020, 11:45 AM

## 2020-01-13 NOTE — CONSULTS
GASTROENTEROLOGY CONSULTATION    Date: 01/13/2020  Date of Admission: 1/12/2020  Reason for Consultation:foreign body ingestion  Requested by:   Alva Blackman  Brief Summary:   We were asked by Alva Blackman  to evaluate this patient with foreign body ingestion    ASSESSMENT AND RECOMMENDATIONS:   Assessment:  29 year old female with a history of depression, anxiety, PTSD, borderline personality disorder, and frequent emergency department visits for impulsive foreign body insertion in rectum/vagina or swallowing objects with Hx of laparotomy and a esophageal perforation in Oct 2019, came with ingestion of straightened pen spring after having her evening meal. S/p EGD this morning with removal of FB.     Recommendations  - s/p EGD in OR under general sedation with removal of FB  - Continue PPI as previously taking    Gastroenterology outpatient follow up recommendations: N/A    Thank you for involving us in this patient's care. Please do not hesitate to contact the GI service with any questions or concerns.     Pt care plan discussed with Dr. Paula, GI staff physician.    Chetan Almazan MD  -------------------------------------------------------------------------------------------------------------------           History of Present Illness:   Nevin Alvarado is a 29 year old female with a history of depression, anxiety, PTSD, borderline personality disorder, and frequent emergency department visits for impulsive foreign body insertion in rectum/vagina or swallowing objects with subsequent laparotomy and esophageal perforation, came with ingestion of straightened pen spring after having her evening meal.    She denied any abd pain or dysphagia, no hematemesis/melena/hematochezia. Prior episode of ingestion was on 1/3 and 1/5.              Past Medical History:   Reviewed and edited as appropriate  Past Medical History:   Diagnosis Date     ADD (attention deficit disorder)      Anorexia nervosa with  bulimia     history of; on Topamax     Anxiety      Borderline personality disorder (H)      Depression      Depressive disorder      H/O self-harm      Lives in independent group home     due to debilitating mental illness     Migraine without aura     no known triggers; on Topamax bid and Imitrex PRN     Morbid obesity (H)      PTSD (post-traumatic stress disorder)      Rectal foreign body - Recurrent issue, self placed      Swallowed foreign body - Recurrent issue, self placed             Past Surgical History:   Reviewed and edited as appropriate   Past Surgical History:   Procedure Laterality Date     COMBINED ESOPHAGOSCOPY, GASTROSCOPY, DUODENOSCOPY (EGD), REPLACE ESOPHAGEAL STENT N/A 10/9/2019    Procedure: Upper Endoscopy with Suture Placement;  Surgeon: Zurdo Ramirez MD;  Location: [facility]     ESOPHAGOSCOPY, GASTROSCOPY, DUODENOSCOPY (EGD), COMBINED N/A 3/9/2017    Procedure: COMBINED ESOPHAGOSCOPY, GASTROSCOPY, DUODENOSCOPY (EGD), REMOVE FOREIGN BODY;  Surgeon: Avis Guzmán MD;  Location: [facility]     ESOPHAGOSCOPY, GASTROSCOPY, DUODENOSCOPY (EGD), COMBINED N/A 4/20/2017    Procedure: COMBINED ESOPHAGOSCOPY, GASTROSCOPY, DUODENOSCOPY (EGD), REMOVE FOREIGN BODY;  EGD removal Foregin body;  Surgeon: Lokesh Paula MD;  Location: [facility]     ESOPHAGOSCOPY, GASTROSCOPY, DUODENOSCOPY (EGD), COMBINED N/A 6/12/2017    Procedure: COMBINED ESOPHAGOSCOPY, GASTROSCOPY, DUODENOSCOPY (EGD);  COMBINED ESOPHAGOSCOPY, GASTROSCOPY, DUODENOSCOPY (EGD) [[id number]]attempted removal of foreign body;  Surgeon: Pamela Perez MD;  Location: [facility]     ESOPHAGOSCOPY, GASTROSCOPY, DUODENOSCOPY (EGD), COMBINED N/A 6/9/2017    Procedure: COMBINED ESOPHAGOSCOPY, GASTROSCOPY, DUODENOSCOPY (EGD), REMOVE FOREIGN BODY;  Esophagoscopy, Gastroscopy, Duodenoscopy, Removal of Foreign Body;  Surgeon: Dejon Marsh MD;  Location: [facility]     ESOPHAGOSCOPY, GASTROSCOPY, DUODENOSCOPY (EGD), COMBINED N/A  1/6/2018    Procedure: COMBINED ESOPHAGOSCOPY, GASTROSCOPY, DUODENOSCOPY (EGD), REMOVE FOREIGN BODY;  COMBINED ESOPHAGOSCOPY, GASTROSCOPY, DUODENOSCOPY (EGD) [by pascal net and snare with profol sedation;  Surgeon: Feliciano Emmanuel MD;  Location:  GI     ESOPHAGOSCOPY, GASTROSCOPY, DUODENOSCOPY (EGD), COMBINED N/A 3/19/2018    Procedure: COMBINED ESOPHAGOSCOPY, GASTROSCOPY, DUODENOSCOPY (EGD), REMOVE FOREIGN BODY;   Esophagodscopy, Gastroscopy, Duodenoscopy,Foreign Body Removal;  Surgeon: Brice Guzmán MD;  Location: [facility]     ESOPHAGOSCOPY, GASTROSCOPY, DUODENOSCOPY (EGD), COMBINED N/A 4/16/2018    Procedure: COMBINED ESOPHAGOSCOPY, GASTROSCOPY, DUODENOSCOPY (EGD), REMOVE FOREIGN BODY;  Esophagogastroduodenoscopy  Foreign Body Removal X 2;  Surgeon: Royer Moise MD;  Location: [facility]     ESOPHAGOSCOPY, GASTROSCOPY, DUODENOSCOPY (EGD), COMBINED N/A 6/1/2018    Procedure: COMBINED ESOPHAGOSCOPY, GASTROSCOPY, DUODENOSCOPY (EGD), REMOVE FOREIGN BODY;  COMBINED ESOPHAGOSCOPY, GASTROSCOPY, DUODENOSCOPY with removal of foreign body, propofol sedation by anesthesia;  Surgeon: Brice Martinez MD;  Location:  GI     ESOPHAGOSCOPY, GASTROSCOPY, DUODENOSCOPY (EGD), COMBINED N/A 7/25/2018    Procedure: COMBINED ESOPHAGOSCOPY, GASTROSCOPY, DUODENOSCOPY (EGD), REMOVE FOREIGN BODY;;  Surgeon: Candy Castelan MD;  Location:  GI     ESOPHAGOSCOPY, GASTROSCOPY, DUODENOSCOPY (EGD), COMBINED N/A 7/28/2018    Procedure: COMBINED ESOPHAGOSCOPY, GASTROSCOPY, DUODENOSCOPY (EGD), REMOVE FOREIGN BODY;  COMBINED ESOPHAGOSCOPY, GASTROSCOPY, DUODENOSCOPY (EGD), REMOVE FOREIGN BODY;  Surgeon: Brice Guzmán MD;  [facility]: [facility]     ESOPHAGOSCOPY, GASTROSCOPY, DUODENOSCOPY (EGD), COMBINED N/A 7/31/2018    Procedure: COMBINED ESOPHAGOSCOPY, GASTROSCOPY, DUODENOSCOPY (EGD);  COMBINED ESOPHAGOSCOPY, GASTROSCOPY, DUODENOSCOPY (EGD) TO REMOVE FOREIGN BODY;  Surgeon: Lokesh Paula MD;  Location:   OR     ESOPHAGOSCOPY, GASTROSCOPY, DUODENOSCOPY (EGD), COMBINED N/A 8/4/2018    Procedure: COMBINED ESOPHAGOSCOPY, GASTROSCOPY, DUODENOSCOPY (EGD), REMOVE FOREIGN BODY;   combined esophagoscopy, gastroscopy, duodenoscopy, REMOVE FOREIGN BODY ;  Surgeon: Lokesh Paula MD;  Location: [facility]     ESOPHAGOSCOPY, GASTROSCOPY, DUODENOSCOPY (EGD), COMBINED N/A 10/6/2019    Procedure: ESOPHAGOGASTRODUODENOSCOPY (EGD) with fireign body removal x2, esophageal stent placement with floroscopy;  Surgeon: Timoteo Espana MD;  Location: [facility]     ESOPHAGOSCOPY, GASTROSCOPY, DUODENOSCOPY (EGD), COMBINED N/A 12/2/2019    Procedure: Esophagogastroduodenoscopy with esophageal stent removal, esophogram;  Surgeon: Kailee Tena MD;  Location: [facility]     ESOPHAGOSCOPY, GASTROSCOPY, DUODENOSCOPY (EGD), COMBINED N/A 12/17/2019    Procedure: ESOPHAGOGASTRODUODENOSCOPY, WITH FOREIGN BODY REMOVAL;  Surgeon: Pamela Perez MD;  [facility]: [facility]     ESOPHAGOSCOPY, GASTROSCOPY, DUODENOSCOPY (EGD), COMBINED N/A 12/13/2019    Procedure: ESOPHAGOGASTRODUODENOSCOPY, WITH FOREIGN BODY REMOVAL;  Surgeon: Samia Stanton MD;  [facility]: [facility]     ESOPHAGOSCOPY, GASTROSCOPY, DUODENOSCOPY (EGD), COMBINED N/A 12/28/2019    Procedure: ESOPHAGOGASTRODUODENOSCOPY (EGD) Removal of Foreign Body X 2;  Surgeon: Huy Kelley MD;  [facility]: [facility]     ESOPHAGOSCOPY, GASTROSCOPY, DUODENOSCOPY (EGD), COMBINED N/A 1/5/2020    Procedure: ESOPHAGOGASTRODUOENOSCOPY WITH FOREIGN BODY REMOVAL;  Surgeon: Pamela Perez MD;  [facility]: [facility]     ESOPHAGOSCOPY, GASTROSCOPY, DUODENOSCOPY (EGD), COMBINED N/A 1/3/2020    Procedure: ESOPHAGOGASTRODUODENOSCOPY (EGD) REMOVAL OF FOREIGN BODY.;  Surgeon: Pamela Perez MD;  [facility]     EXAM UNDER ANESTHESIA ANUS N/A 1/10/2017    Procedure: EXAM UNDER ANESTHESIA ANUS;  Surgeon: Annmarie Haynes MD;  [facility]: [facility]     EXAM UNDER ANESTHESIA RECTUM N/A  7/19/2018    Procedure: EXAM UNDER ANESTHESIA RECTUM;  EXAM UNDER ANESTHESIA, REMOVAL OF RECTAL FOREIGN BODY;  Surgeon: Annmarie Haynes MD;  Location: [facility]     HC REMOVE FECAL IMPACTION OR FB W ANESTHESIA N/A 12/18/2016    Procedure: REMOVE FECAL IMPACTION/FOREIGN BODY UNDER ANESTHESIA;  Surgeon: Soham Cano MD;  Location: [facility]     KNEE SURGERY - removed a small tissue mass from knee Right      LAPAROSCOPIC ABLATION ENDOMETRIOSIS       LAPAROTOMY EXPLORATORY N/A 1/10/2017    Procedure: LAPAROTOMY EXPLORATORY;  Surgeon: Annmarie Haynes MD;  Location: [facility]     LAPAROTOMY EXPLORATORY  09/11/2019    Manual manipulation of bowel to remove pill bottle in rectum     lymph nodes removed from neck; benign  age 7     MAMMOPLASTY REDUCTION Bilateral      RELEASE CARPAL TUNNEL Bilateral      SIGMOIDOSCOPY FLEXIBLE N/A 1/10/2017    Procedure: SIGMOIDOSCOPY FLEXIBLE;  Surgeon: Annmarie Haynes MD;  Location: [facility]     SIGMOIDOSCOPY FLEXIBLE N/A 5/8/2018    Procedure: SIGMOIDOSCOPY FLEXIBLE;  flex sig with foreign body removal using snare and rattooth forcep;  Surgeon: Soham Cano MD;  Location:  GI     SIGMOIDOSCOPY FLEXIBLE N/A 2/20/2019    Procedure: Exam under anesthesia Colonoscopy with attempted  removal of rectal foreign body;  Surgeon: Estrada Chávez MD;  Location: [facility]            Previous Endoscopy:   No results found for this or any previous visit.         Social History:   Reviewed and edited as appropriate  Social History     Socioeconomic History     Marital status: Single     Spouse name: Not on file     Number of children: Not on file     Years of education: Not on file     Highest education level: Not on file   Occupational History     Occupation: On disability   Social Needs     Financial resource strain: Not on file     Food insecurity:     Worry: Not on file     Inability: Not on file     Transportation needs:     Medical: Not on file     Non-medical: Not on file    Tobacco Use     Smoking status: Never Smoker     Smokeless tobacco: Never Used   Substance and Sexual Activity     Alcohol use: No     Alcohol/week: 0.0 standard drinks     Drug use: No     Sexual activity: Yes     Partners: Male     Birth control/protection: I.U.D.     Comment: IUD - Mirena - placed July, 2015   Lifestyle     Physical activity:     Days per week: Not on file     Minutes per session: Not on file     Stress: Not on file   Relationships     Social connections:     Talks on phone: Not on file     Gets together: Not on file     Attends Nondenominational service: Not on file     Active member of club or organization: Not on file     Attends meetings of clubs or organizations: Not on file     Relationship status: Not on file     Intimate partner violence:     Fear of current or ex partner: Not on file     Emotionally abused: Not on file     Physically abused: Not on file     Forced sexual activity: Not on file   Other Topics Concern     Parent/sibling w/ CABG, MI or angioplasty before 65F 55M? Not Asked   Social History Narrative    Single.    Living in independent living portion of [facility].    On disability.    No regular exercise.             Family History:   Reviewed and edited as appropriate  Family History   Problem Relation Age of Onset     Diabetes Type 2  Maternal Grandmother      Diabetes Type 2  Paternal Grandmother      Breast Cancer Paternal Grandmother      Cerebrovascular Disease Father 53     Myocardial Infarction No family hx of      Coronary Artery Disease Early Onset No family hx of      Ovarian Cancer No family hx of      Colon Cancer No family hx of             Allergies:   Reviewed and edited as appropriate     Allergies   Allergen Reactions     Amoxicillin-Pot Clavulanate Other (See Comments), Rash and Swelling     PN: facial swelling, left side. Also had cortisone injection the same day as she started the Augmentin.  PN: facial swelling, left side. Also had cortisone  injection the same day as she started the Augmentin.  Noted in downtime recovery of chart.       Penicillins Anaphylaxis     Vancomycin Swelling, Itching and Rash     Other reaction(s): Redness  Flushed face, very itchy; HUT Comment: Flushed face, very itchy; HUT Reaction: Angioedema; HUT Reaction: Redness; HUT Severity: Med; HUT Noted: [id number]  Flushed face, very itchy  Flushed face, very itchy  Flushed face, very itchy       Hydrocodone Nausea and Vomiting and GI Disturbance     vomiting for days  vomiting for days  vomiting for days  vomiting for days, PN: vomiting for days  vomiting for days  vomiting for days  vomiting for days  vomiting for days  vomiting for days  vomiting for days, PN: vomiting for days  vomiting for days  vomiting for days  vomiting for days  vomiting for days  ; HUT Comment: vomiting for days; HUT Reaction: Gastrointestinal; HUT Reaction: Nausea And Vomiting; HUT Reaction Type: Intolerance; HUT Severity: Med; HUT Noted: [id number]  vomiting for days       Blood-Group Specific Substance Other (See Comments)     Patient has an anti-Cw and non-specific antibodies. Blood product orders may be delayed. Draw one red top and two purple top tubes for all type/screen/crossmatch orders.     Influenza Vaccines Other (See Comments)     Oseltamivir Hives     med stopped, PN: med stopped     Cephalosporins Rash     Lamotrigine Rash     Possibly associated with Lamictal.   HUT Comment: Possibly associated with Lamictal. ; HUT Reaction: Rash; HUT Reaction Type: Allergy; HUT Severity: Low; HUT Noted: [id number]     Latex Rash            Medications:     Current Facility-Administered Medications   Medication     [Auto Hold] acetaminophen (TYLENOL) Suppository 650 mg     [Auto Hold] acetaminophen (TYLENOL) tablet 650 mg     [Auto Hold] busPIRone (BUSPAR) tablet 10 mg     [Auto Hold] cloNIDine (CATAPRES) tablet 0.1 mg     [Auto Hold] desvenlafaxine (PRISTIQ) 24 hr tablet 100 mg     dimenhyDRINATE (DRAMAMINE)  injection 25 mg     [Auto Hold] gabapentin (NEURONTIN) tablet 600 mg     HYDROmorphone (PF) (DILAUDID) injection 0.3-0.5 mg     [Auto Hold] hydrOXYzine (ATARAX) tablet 25 mg     lactated ringers infusion     [Auto Hold] lurasidone (LATUDA) tablet 60 mg     [Auto Hold] melatonin tablet 1 mg     meperidine (DEMEROL) injection 12.5 mg     [Auto Hold] naloxone (NARCAN) injection 0.1-0.4 mg     naloxone (NARCAN) injection 0.1-0.4 mg     [Auto Hold] ondansetron (ZOFRAN-ODT) ODT tab 4 mg    Or     [Auto Hold] ondansetron (ZOFRAN) injection 4 mg     ondansetron (ZOFRAN-ODT) ODT tab 4 mg    Or     ondansetron (ZOFRAN) injection 4 mg     ORAL Pain Medications -  may administer as ordered by surgeon for take home use     [Auto Hold] polyethylene glycol (MIRALAX/GLYCOLAX) Packet 17 g     prochlorperazine (COMPAZINE) injection 10 mg     sodium chloride 0.9% infusion     [Auto Hold] topiramate (TOPAMAX) tablet 100 mg     Current Outpatient Medications   Medication Sig     acetaminophen (TYLENOL) 32 mg/mL liquid Take 31.25 mLs (1,000 mg) by mouth every 6 hours as needed for fever or pain     albuterol (PROAIR HFA) 108 (90 Base) MCG/ACT inhaler Inhale 2 puffs into the lungs 4 times daily as needed      alum & mag hydroxide-simethicone (MYLANTA/MAALOX) 200-200-20 MG/5ML SUSP suspension Take 30 mLs by mouth every 6 hours as needed for indigestion     apixaban ANTICOAGULANT (ELIQUIS STARTER PACK) 5 MG tablet Take 2 tablets (10 mg) by mouth 2 times daily for 7 days, THEN 1 tablet (5 mg) 2 times daily for 23 days.     busPIRone (BUSPAR) 10 MG tablet Take 1 tablet (10 mg) by mouth twice daily     calcium carbonate (TUMS) 500 MG chewable tablet Take 1 chew tab by mouth 3 times daily as needed      cloNIDine (CATAPRES) 0.1 MG tablet Take 0.1 mg by mouth 2 times daily      desvenlafaxine (PRISTIQ) 100 MG 24 hr tablet Take 1 tablet (100 mg) by mouth every morning     diphenhydrAMINE (BENADRYL) 25 MG capsule Take 1-2 capsules (25-50 mg) by  "mouth every 6 hours as needed for itching or sleep     gabapentin (NEURONTIN) 600 MG tablet Take 600 mg by mouth 3 times daily      hydrOXYzine (ATARAX) 25 MG tablet Take 1 tablet (25 mg) by mouth 3 times daily as needed for anxiety     levonorgestrel (MIRENA) 20 MCG/24HR IUD 1 each by Intrauterine route once     lidocaine (XYLOCAINE) 2 % solution Swish and spit 15 mLs in mouth every 6 hours as needed (soar throat)     lurasidone (LATUDA) 60 MG TABS tablet Take 1 tablet (60 mg) by mouth at bedtime     metFORMIN (GLUCOPHAGE-XR) 500 MG 24 hr tablet Take 1 tablet (500 mg) by mouth daily     ondansetron (ZOFRAN-ODT) 8 MG ODT tab Take 1 tablet (8 mg) by mouth every 6 hours as needed for nausea or vomiting     oxyCODONE (ROXICODONE) 5 MG tablet Take 5 mg by mouth every 4 hours as needed (pain) (patient rarely uses)     pantoprazole (PROTONIX) 40 MG EC tablet Take 1 tablet (40 mg) by mouth 2 times daily     simethicone (MYLICON) 80 MG chewable tablet Take 1 tablet (80 mg) by mouth every 6 hours as needed for flatulence or cramping (after meals)     sucralfate (CARAFATE) 1 GM/10ML suspension Take 10 mLs (1 g) by mouth 4 times daily     topiramate (TOPAMAX) 100 MG tablet Take 1 tablet (100 mg) by mouth daily at bedtime     vitamin D3 (CHOLECALCIFEROL) 2000 units (50 mcg) tablet Take 1 tablet (2,000 units) by mouth daily     docosanol (ABREVA) 10 % CREA cream Apply to lip 5 times a day as soon as symptoms begin, do not use for more than 10 days.             Review of Systems:     A complete 10 point review of systems was performed and is negative except as noted in the HPI           Physical Exam:   /82   Pulse 97   Temp 98  F (36.7  C) (Oral)   Resp 16   Ht 1.575 m (5' 2\")   Wt 109.8 kg (242 lb)   SpO2 98%   BMI 44.26 kg/m    Wt:   Wt Readings from Last 2 Encounters:   01/12/20 109.8 kg (242 lb)   01/05/20 112.2 kg (247 lb 7 oz)      Constitutional: cooperative, pleasant  Eyes: Sclera " anicteric  Ears/nose/mouth/throat: mucus membranes moist  Neck: supple, thyroid normal size  CV: No edema  Respiratory: Unlabored breathing  Abd:  Nondistended, +bs, nontender  Skin: warm, perfused  Neuro: AAO x 3  MSK: No gross deformities         Data:   Labs and imaging below were independently reviewed and interpreted    BMP  Recent Labs   Lab 01/13/20  0654      POTASSIUM 3.4   CHLORIDE 115*   KELBY 8.0*   CO2 21   BUN 10   CR 0.52   *     CBC  Recent Labs   Lab 01/13/20  0654   WBC 6.6   RBC 3.64*   HGB 10.5*   HCT 33.0*   MCV 91   MCH 28.8   MCHC 31.8   RDW 13.8        INRNo lab results found in last 7 days.  LFTsNo lab results found in last 7 days.   PANCNo lab results found in last 7 days.    Imaging:  AXR 1/12/20:                                                                   Impression: Single wire shaped metallic object projects over the left  midabdomen. No pneumatosis or free air.

## 2020-01-13 NOTE — OR NURSING
Was able to text discharge instructions to Isaiah Cuellar (pt sister) who is deaf.. We took photos of the discharge instructions et emergency contact numbers.. Sister acknowledged. Sister stated she would be staying overnight in apartment where hers is adjacent to Pts.  . Also Checked with Jacob Gates MD .. Explained discharge situation with Jacob et agreed  that we can proceed with Pts discharge.  [facility] was contacted et will be coming to pick pt up.. Talked with Drew who is a ..

## 2020-01-13 NOTE — DISCHARGE SUMMARY
[facility], [facility] Discharge Summary   Date of Admission:  1/12/2020  Date of Discharge:  1/13/2020  Date of Service: 1/13/2020  Discharging Attending Provider: Dr. Lyndsey Conner DO  Discharge Team: [facility] Service  Discharge Diagnoses      Swallowed foreign body, initial encounter  Swallowed foreign body, initial encounter    Follow-ups Needed After Discharge   Follow up with PCP within the next 1 week for hospital follow up.   Follow up with therapy. Follow up with psychiatry.     Hospital Course   Nevin Alvarado is a 29 year old female admitted on 1/12/2020. She has a history of depression, anxiety, PTSD, borderline personality disorder, and frequent emergency department visits for impulsive foreign body insertion in rectum/vagina or swallowing objects with subsequent laparotomy and esophageal perforation s/p stent and now recent stent removal (12/03/19). She is admitted for swallowing foreign body (stretched out pen spring) where the initial XR chest abdomen showed a single wire shaped metallic object in the left midabdomen, no pneumatosis or free air. ED provider spoke with GI who recommended admission for scope.     # Recurrent impulsive foreign body ingestion: has hx of multiple foreign body ingestions/insertions (this is the 4th ED visit for the same in the month of January) with prior esophageal stent placement 10/9/2019 (now s/p removal) for esophageal perforation and prior laparotomy. This time, ingested pen spring, currently appears to be in stomach. She reports that she called crisis number given twice and that she has been seeing therapy for this impulse driven act. She is s/p endoscopic removal of object on 1/13 with GI team and did well, no complications. Tolerated diet, denies pain. Sister to stay with her overnight after general anesthesia and RN confirmed sister aware of plans.      Recent provoked R interlobar/lower  lobe PE on eliquis: Plan to continue for 3 months per note (through the end of Feb 2020).   - Resume eliquis post-op     Complex psychocosocial history  Depression  anxiety  hx of PTSD  Denied her mood being worse. Denied recent change in her meds. Takes multiple psych meds, and followed by outside psychiatrist, therapist and behavioral analyst. She lives in an apartment by herself, but her sister lives in the next room. See above, seems to have good insight and working towards therapy/ psychiatric plan. Return to ED precautions given.   - continue home psych meds: buspar, clonidine, neurontin, latuda, pristiq and topamax + as needed atarax    # Discharge Pain Plan:   - Patient currently has NO PAIN and is not being prescribed pain medications on discharge.    Consultations This Hospital Stay   GI LUMINAL ADULT IP CONSULT    Code Status   Full Code     The patient was discussed with Dr. Conner.     [facility]  [facility]   Pager: [phone]  ______________________________________________________________________  Review of Systems   Constitutional: Negative for chills and fever.   Respiratory: Negative for shortness of breath.    Cardiovascular: Negative for chest pain.   Gastrointestinal: Negative for abdominal pain.     Physical Exam   Vital Signs: Temp: 98.6  F (37  C) Temp src: Oral BP: 124/85 Pulse: 98 Heart Rate: 98 Resp: 16 SpO2: 98 % O2 Device: None (Room air) Oxygen Delivery: 2 LPM  Weight: 242 lbs 0 oz    Physical Exam  General Appearance: no acute distress, resting in bed, obese  HEENT: normocephalic, atraumatic  Respiratory: clear to ascultation bilaterally, no wheezes or rales  Cardiovascular: regular rate and rhythm, no lower extremity pitting edema  GI: soft, nontender abdomen, midline scar consistent with prior laparotomy  Lymph/Hematologic: no CLAD  Skin: no rash or jaundice  Musculoskeletal: moving all extremities  spontaneously   Neurologic: AOx3  Psychiatric: flat affect, good insight    Significant Results and Procedures   Results for orders placed or performed during the hospital encounter of 01/12/20   XR Chest 1 View    Narrative    EXAMINATION: XR CHEST 1 VW, 1/12/2020 9:23 PM    INDICATION: Swallowed foreign body    COMPARISON: Chest x-ray 12/28/2019.    FINDINGS: Single portable AP radiograph of the chest. Right chest  Port-A-Cath with tip projecting over the cavoatrial junction. Trachea  is midline. The cardiomediastinal silhouette is within normal limits.  There is no pleural effusion. No pneumothorax. No focal consolidative  opacity. Partially visualized upper abdomen is unremarkable. Foreign  body is not appreciated on this exam, it is better seen on same-day  abdomen and pelvis radiograph. No free air in the abdomen. No  pneumatosis. No portal venous gas.      Impression    IMPRESSION:   1. No acute pulmonary pathology.  2. Foreign body is not appreciated on this radiograph, it is better  seen on same-day abdomen and pelvis radiograph.    I have personally reviewed the examination and initial interpretation  and I agree with the findings.    RICHELLE JACKSON MD   XR Abdomen 1 View     Value    Radiologist flags Foreign body projecting in the left mid abdomen (Urgent)    Narrative    Exam: XR ABDOMEN 1 VW, 1/12/2020 9:24 PM    Indication: Swallowed foreign body. Per discussion with the emergency  department, patient swallowed a new foreign body. The foreign body  seen on prior imaging dated 1/5/2020 was removed on 1/5/2020.    Comparison: X-ray 1/5/2020.    Findings:   Single portable AP radiograph of the abdomen and upright positioning.  Single, wire shaped metallic object projects over the left mid  abdomen. Nonobstructive bowel gas pattern. No pneumatosis. No  pneumoperitoneum. No portal venous gas. Unchanged S-curve of the  visualized thoracic lumbar spine.      Impression    Impression: Single wire shaped  metallic object projects over the left  midabdomen. No pneumatosis or free air.    [Urgent Result: Foreign body projecting in the left mid abdomen]    Finding was identified on 1/12/2020 10:02 PM.     Dr. Basurto was contacted by Dr. Mueller at 1/12/2020 10:43 PM and  verbalized understanding of the urgent finding.     I have personally reviewed the examination and initial interpretation  and I agree with the findings.    RICHELLE JACKSON MD       Pending Results   These results will be followed up by N/A.   Unresulted Labs Ordered in the Past 30 Days of this Admission     No orders found for last 31 day(s).        Primary Care Physician   Latonya Knight    Discharge Disposition   Discharged to home  Condition at discharge: Stable    Discharge Orders      Reason for your hospital stay    You were hospitalized for a foreign body ingestion. GI service did a endoscopy retrieval of the foreign object from your GI tract on 1/13 without complication. You are discharged back home with recommended close follow up with you primary care doctor as well as continuing therapy and seeing your psychiatrist regularly to avoid future ingestions.     [facility] Follow-up and recommended labs and tests    Follow up with primary care provider, Latonya Knight, within 7 days for hospital follow- up.    Follow up with your therapist as previously planned.  Follow up with psychiatry within the next 1 month for hospital follow up.     Appointments on [place] and/or [facility] (with [facility] or [facility] provider or service). Call [phone] if you haven't heard regarding these appointments within 7 days of discharge.     Activity    Your activity upon discharge: activity as tolerated     When to contact your care team    Call your primary doctor if you have any of the following: temperature greater than 100.4 or increased pain.  Call your crisis number if you have any thoughts or impulses to swallow objects.   Return to ED if you  swallow objects.     Diet    Follow this diet upon discharge: Regular diet.     Discharge Medications   Current Discharge Medication List      CONTINUE these medications which have NOT CHANGED    Details   acetaminophen (TYLENOL) 32 mg/mL liquid Take 31.25 mLs (1,000 mg) by mouth every 6 hours as needed for fever or pain  Qty: 355 mL, Refills: 0    Associated Diagnoses: Esophageal dysphagia; Hx of foreign body ingestion      albuterol (PROAIR HFA) 108 (90 Base) MCG/ACT inhaler Inhale 2 puffs into the lungs 4 times daily as needed       alum & mag hydroxide-simethicone (MYLANTA/MAALOX) 200-200-20 MG/5ML SUSP suspension Take 30 mLs by mouth every 6 hours as needed for indigestion      apixaban ANTICOAGULANT (ELIQUIS STARTER PACK) 5 MG tablet Take 2 tablets (10 mg) by mouth 2 times daily for 7 days, THEN 1 tablet (5 mg) 2 times daily for 23 days.  Qty: 74 tablet, Refills: 0    Associated Diagnoses: Acute pulmonary embolism without acute cor pulmonale, unspecified pulmonary embolism type (H)      busPIRone (BUSPAR) 10 MG tablet Take 1 tablet (10 mg) by mouth twice daily      calcium carbonate (TUMS) 500 MG chewable tablet Take 1 chew tab by mouth 3 times daily as needed       cloNIDine (CATAPRES) 0.1 MG tablet Take 0.1 mg by mouth 2 times daily       desvenlafaxine (PRISTIQ) 100 MG 24 hr tablet Take 1 tablet (100 mg) by mouth every morning      diphenhydrAMINE (BENADRYL) 25 MG capsule Take 1-2 capsules (25-50 mg) by mouth every 6 hours as needed for itching or sleep      gabapentin (NEURONTIN) 600 MG tablet Take 600 mg by mouth 3 times daily       hydrOXYzine (ATARAX) 25 MG tablet Take 1 tablet (25 mg) by mouth 3 times daily as needed for anxiety  Qty: 42 tablet, Refills: 0    Associated Diagnoses: Foreign body in digestive tract, initial encounter      levonorgestrel (MIRENA) 20 MCG/24HR IUD 1 each by Intrauterine route once      lidocaine (XYLOCAINE) 2 % solution Swish and spit 15 mLs in mouth every 6 hours as needed  (soar throat)  Qty: 100 mL, Refills: 0    Associated Diagnoses: Esophageal dysphagia; Hx of foreign body ingestion      lurasidone (LATUDA) 60 MG TABS tablet Take 1 tablet (60 mg) by mouth at bedtime      metFORMIN (GLUCOPHAGE-XR) 500 MG 24 hr tablet Take 1 tablet (500 mg) by mouth daily      ondansetron (ZOFRAN-ODT) 8 MG ODT tab Take 1 tablet (8 mg) by mouth every 6 hours as needed for nausea or vomiting  Qty: 20 tablet, Refills: 1    Associated Diagnoses: Swallowed foreign body, initial encounter      oxyCODONE (ROXICODONE) 5 MG tablet Take 5 mg by mouth every 4 hours as needed (pain) (patient rarely uses)      pantoprazole (PROTONIX) 40 MG EC tablet Take 1 tablet (40 mg) by mouth 2 times daily  Qty: 62 tablet, Refills: 0    Associated Diagnoses: Foreign body in digestive tract, initial encounter      simethicone (MYLICON) 80 MG chewable tablet Take 1 tablet (80 mg) by mouth every 6 hours as needed for flatulence or cramping (after meals)  Qty: 30 tablet, Refills: 0    Associated Diagnoses: Esophageal dysphagia; Hx of foreign body ingestion      sucralfate (CARAFATE) 1 GM/10ML suspension Take 10 mLs (1 g) by mouth 4 times daily  Qty: 420 mL, Refills: 1    Associated Diagnoses: Esophageal dysphagia      topiramate (TOPAMAX) 100 MG tablet Take 1 tablet (100 mg) by mouth daily at bedtime      vitamin D3 (CHOLECALCIFEROL) 2000 units (50 mcg) tablet Take 1 tablet (2,000 units) by mouth daily      docosanol (ABREVA) 10 % CREA cream Apply to lip 5 times a day as soon as symptoms begin, do not use for more than 10 days.           Allergies   Allergies   Allergen Reactions     Amoxicillin-Pot Clavulanate Other (See Comments), Rash and Swelling     PN: facial swelling, left side. Also had cortisone injection the same day as she started the Augmentin.  PN: facial swelling, left side. Also had cortisone injection the same day as she started the Augmentin.  Noted in downtime recovery of chart.       Penicillins Anaphylaxis      Vancomycin Swelling, Itching and Rash     Other reaction(s): Redness  Flushed face, very itchy; HUT Comment: Flushed face, very itchy; HUT Reaction: Angioedema; HUT Reaction: Redness; HUT Severity: Med; HUT Noted: [id number]  Flushed face, very itchy  Flushed face, very itchy  Flushed face, very itchy       Hydrocodone Nausea and Vomiting and GI Disturbance     vomiting for days  vomiting for days  vomiting for days  vomiting for days, PN: vomiting for days  vomiting for days  vomiting for days  vomiting for days  vomiting for days  vomiting for days  vomiting for days, PN: vomiting for days  vomiting for days  vomiting for days  vomiting for days  vomiting for days  ; HUT Comment: vomiting for days; HUT Reaction: Gastrointestinal; HUT Reaction: Nausea And Vomiting; HUT Reaction Type: Intolerance; HUT Severity: Med; HUT Noted: [id number]  vomiting for days       Blood-Group Specific Substance Other (See Comments)     Patient has an anti-Cw and non-specific antibodies. Blood product orders may be delayed. Draw one red top and two purple top tubes for all type/screen/crossmatch orders.     Influenza Vaccines Other (See Comments)     Oseltamivir Hives     med stopped, PN: med stopped     Cephalosporins Rash     Lamotrigine Rash     Possibly associated with Lamictal.   HUT Comment: Possibly associated with Lamictal. ; HUT Reaction: Rash; HUT Reaction Type: Allergy; HUT Severity: Low; HUT Noted: [id number]     Latex Rash

## 2020-01-13 NOTE — ANESTHESIA CARE TRANSFER NOTE
Patient: Nevin Alvarado    Procedure(s):  ESOPHAGOGASTRODUODENOSCOPY (EGD) for foreign body removal    Diagnosis: Swallowed foreign body, initial encounter [T18.9XXA]  Diagnosis Additional Information: No value filed.    Anesthesia Type:   General     Note:  Airway :Nasal Cannula  Patient transferred to:PACU  Comments: Oropharynx suctioned. spont resp. Extubated. Exchanging well. To PACU. Report to RN at bedside. Pt awake and asking questions. Handoff Report: Identifed the Patient, Identified the Reponsible Provider, Reviewed the pertinent medical history, Discussed the surgical course, Reviewed Intra-OP anesthesia mangement and issues during anesthesia, Set expectations for post-procedure period and Allowed opportunity for questions and acknowledgement of understanding      Vitals: (Last set prior to Anesthesia Care Transfer)    CRNA VITALS  1/13/2020 0944 - 1/13/2020 1025      1/13/2020             Pulse:  98    SpO2:  97 %    Resp Rate (observed):  (!) 6                Electronically Signed By: HU Lawrence CRNA  January 13, 2020  10:25 AM

## 2020-01-13 NOTE — H&P
[facility], [facility] History and Physical - [facility]       Date of Admission:  1/12/2020    Chief Complaint   Swallowed FB    History is obtained from the patient    History of Present Illness   Nevin Alvarado is a 29 year old female who has a history of depression, anxiety, PTSD, borderline personality disorder, and frequent emergency department visits for impulsive foreign body insertion in rectum/vagina or swallowing objects with subsequent laparotomy and esophageal perforation. She is admitted for a swallowed foreign body.    She presented to the ED 1 hour after swallowing a pen spring. States she did not swallow or insert anything else. Initially reported abdominal pain, but denies on my interview. Denies nausea or vomiting. Denies mouth soreness or cuts. States she swallowed the pen spring on impulse, without intent to harm herself. She then called the crisis line and they called the ambulance. Denies SI/HI/AVH. States her mood is good.     In the ED, she is slightly tachycardic to 102 and hypertensive to 147/94, regular RR and saturating well on RA. No labs obtained, XR chest abdomen showed a single wire shaped metallic object in the left midabdomen, no pneumatosis or free air. ED provider spoke with GI who recommended admission for scope in the morning.     Review of Systems   The 10 point Review of Systems is negative other than noted in the HPI or here.     Past Medical History    I have reviewed this patient's medical history and updated it with pertinent information if needed.   Past Medical History:   Diagnosis Date     ADD (attention deficit disorder)      Anorexia nervosa with bulimia     history of; on Topamax     Anxiety      Borderline personality disorder (H)      Depression      Depressive disorder      H/O self-harm      Lives in independent group home     due to debilitating mental illness     Migraine without aura     no known  triggers; on Topamax bid and Imitrex PRN     Morbid obesity (H)      PTSD (post-traumatic stress disorder)      Rectal foreign body - Recurrent issue, self placed      Swallowed foreign body - Recurrent issue, self placed         Past Surgical History   I have reviewed this patient's surgical history and updated it with pertinent information if needed.  Past Surgical History:   Procedure Laterality Date     COMBINED ESOPHAGOSCOPY, GASTROSCOPY, DUODENOSCOPY (EGD), REPLACE ESOPHAGEAL STENT N/A 10/9/2019    Procedure: Upper Endoscopy with Suture Placement;  Surgeon: Zurdo Ramirez MD;  [facility]: [facility]     ESOPHAGOSCOPY, GASTROSCOPY, DUODENOSCOPY (EGD), COMBINED N/A 3/9/2017    Procedure: COMBINED ESOPHAGOSCOPY, GASTROSCOPY, DUODENOSCOPY (EGD), REMOVE FOREIGN BODY;  Surgeon: Avis Guzmán MD;  Location: [facility]     ESOPHAGOSCOPY, GASTROSCOPY, DUODENOSCOPY (EGD), COMBINED N/A 4/20/2017    Procedure: COMBINED ESOPHAGOSCOPY, GASTROSCOPY, DUODENOSCOPY (EGD), REMOVE FOREIGN BODY;  EGD removal Foregin body;  Surgeon: Lokesh Paula MD;  [facility]     ESOPHAGOSCOPY, GASTROSCOPY, DUODENOSCOPY (EGD), COMBINED N/A 6/12/2017    Procedure: COMBINED ESOPHAGOSCOPY, GASTROSCOPY, DUODENOSCOPY (EGD);  COMBINED ESOPHAGOSCOPY, GASTROSCOPY, DUODENOSCOPY (EGD) [[id number]]attempted removal of foreign body;  Surgeon: Pamela Perez MD;  [facility]     ESOPHAGOSCOPY, GASTROSCOPY, DUODENOSCOPY (EGD), COMBINED N/A 6/9/2017    Procedure: COMBINED ESOPHAGOSCOPY, GASTROSCOPY, DUODENOSCOPY (EGD), REMOVE FOREIGN BODY;  Esophagoscopy, Gastroscopy, Duodenoscopy, Removal of Foreign Body;  Surgeon: Dejon Marsh MD;  [facility]     ESOPHAGOSCOPY, GASTROSCOPY, DUODENOSCOPY (EGD), COMBINED N/A 1/6/2018    Procedure: COMBINED ESOPHAGOSCOPY, GASTROSCOPY, DUODENOSCOPY (EGD), REMOVE FOREIGN BODY;  COMBINED ESOPHAGOSCOPY, GASTROSCOPY, DUODENOSCOPY (EGD) [by pascal net and snare with profol sedation;   Surgeon: Feliciano Emmanuel MD;  Location:  GI     ESOPHAGOSCOPY, GASTROSCOPY, DUODENOSCOPY (EGD), COMBINED N/A 3/19/2018    Procedure: COMBINED ESOPHAGOSCOPY, GASTROSCOPY, DUODENOSCOPY (EGD), REMOVE FOREIGN BODY;   Esophagodscopy, Gastroscopy, Duodenoscopy,Foreign Body Removal;  Surgeon: Brice Guzmán MD;  [facility]     ESOPHAGOSCOPY, GASTROSCOPY, DUODENOSCOPY (EGD), COMBINED N/A 4/16/2018    Procedure: COMBINED ESOPHAGOSCOPY, GASTROSCOPY, DUODENOSCOPY (EGD), REMOVE FOREIGN BODY;  Esophagogastroduodenoscopy  Foreign Body Removal X 2;  Surgeon: Royer Moise MD;  [facility]     ESOPHAGOSCOPY, GASTROSCOPY, DUODENOSCOPY (EGD), COMBINED N/A 6/1/2018    Procedure: COMBINED ESOPHAGOSCOPY, GASTROSCOPY, DUODENOSCOPY (EGD), REMOVE FOREIGN BODY;  COMBINED ESOPHAGOSCOPY, GASTROSCOPY, DUODENOSCOPY with removal of foreign body, propofol sedation by anesthesia;  Surgeon: Brice Martinez MD;  Location:  GI     ESOPHAGOSCOPY, GASTROSCOPY, DUODENOSCOPY (EGD), COMBINED N/A 7/25/2018    Procedure: COMBINED ESOPHAGOSCOPY, GASTROSCOPY, DUODENOSCOPY (EGD), REMOVE FOREIGN BODY;;  Surgeon: Candy Castelan MD;  Location:  GI     ESOPHAGOSCOPY, GASTROSCOPY, DUODENOSCOPY (EGD), COMBINED N/A 7/28/2018    Procedure: COMBINED ESOPHAGOSCOPY, GASTROSCOPY, DUODENOSCOPY (EGD), REMOVE FOREIGN BODY;  COMBINED ESOPHAGOSCOPY, GASTROSCOPY, DUODENOSCOPY (EGD), REMOVE FOREIGN BODY;  Surgeon: Brice Guzmán MD;  [facility]     ESOPHAGOSCOPY, GASTROSCOPY, DUODENOSCOPY (EGD), COMBINED N/A 7/31/2018    Procedure: COMBINED ESOPHAGOSCOPY, GASTROSCOPY, DUODENOSCOPY (EGD);  COMBINED ESOPHAGOSCOPY, GASTROSCOPY, DUODENOSCOPY (EGD) TO REMOVE FOREIGN BODY;  Surgeon: Lokesh Paula MD;  [facility]     ESOPHAGOSCOPY, GASTROSCOPY, DUODENOSCOPY (EGD), COMBINED N/A 8/4/2018    Procedure: COMBINED ESOPHAGOSCOPY, GASTROSCOPY, DUODENOSCOPY (EGD), REMOVE FOREIGN BODY;   combined esophagoscopy,  gastroscopy, duodenoscopy, REMOVE FOREIGN BODY ;  Surgeon: Lokesh Paula MD;  [facility]     ESOPHAGOSCOPY, GASTROSCOPY, DUODENOSCOPY (EGD), COMBINED N/A 10/6/2019    Procedure: ESOPHAGOGASTRODUODENOSCOPY (EGD) with fireign body removal x2, esophageal stent placement with floroscopy;  Surgeon: Timoteo Espana MD;  [facility]     ESOPHAGOSCOPY, GASTROSCOPY, DUODENOSCOPY (EGD), COMBINED N/A 12/2/2019    Procedure: Esophagogastroduodenoscopy with esophageal stent removal, esophogram;  Surgeon: Kailee Tena MD;  [facility]     ESOPHAGOSCOPY, GASTROSCOPY, DUODENOSCOPY (EGD), COMBINED N/A 12/17/2019    Procedure: ESOPHAGOGASTRODUODENOSCOPY, WITH FOREIGN BODY REMOVAL;  Surgeon: Pamela Perez MD;  [facility]     ESOPHAGOSCOPY, GASTROSCOPY, DUODENOSCOPY (EGD), COMBINED N/A 12/13/2019    Procedure: ESOPHAGOGASTRODUODENOSCOPY, WITH FOREIGN BODY REMOVAL;  Surgeon: Samia Stanton MD;  [facility]     ESOPHAGOSCOPY, GASTROSCOPY, DUODENOSCOPY (EGD), COMBINED N/A 12/28/2019    Procedure: ESOPHAGOGASTRODUODENOSCOPY (EGD) Removal of Foreign Body X 2;  Surgeon: Huy Kelley MD;  [facility]     ESOPHAGOSCOPY, GASTROSCOPY, DUODENOSCOPY (EGD), COMBINED N/A 1/5/2020    Procedure: ESOPHAGOGASTRODUOENOSCOPY WITH FOREIGN BODY REMOVAL;  Surgeon: Pamela Perez MD;  [facility]     ESOPHAGOSCOPY, GASTROSCOPY, DUODENOSCOPY (EGD), COMBINED N/A 1/3/2020    Procedure: ESOPHAGOGASTRODUODENOSCOPY (EGD) REMOVAL OF FOREIGN BODY.;  Surgeon: Pamela Perez MD;  [facility]     EXAM UNDER ANESTHESIA ANUS N/A 1/10/2017    Procedure: EXAM UNDER ANESTHESIA ANUS;  Surgeon: Annmarie Haynes MD;  [facility]     EXAM UNDER ANESTHESIA RECTUM N/A 7/19/2018    Procedure: EXAM UNDER ANESTHESIA RECTUM;  EXAM UNDER ANESTHESIA, REMOVAL OF RECTAL FOREIGN BODY;  Surgeon: Annmarie Haynes MD;  [facility]     HC REMOVE FECAL  IMPACTION OR FB W ANESTHESIA N/A 12/18/2016    Procedure: REMOVE FECAL IMPACTION/FOREIGN BODY UNDER ANESTHESIA;  Surgeon: Soham Cano MD;  Location: [facility]     KNEE SURGERY - removed a small tissue mass from knee Right      LAPAROSCOPIC ABLATION ENDOMETRIOSIS       LAPAROTOMY EXPLORATORY N/A 1/10/2017    Procedure: LAPAROTOMY EXPLORATORY;  Surgeon: Annmarie Haynes MD;  [facility]     LAPAROTOMY EXPLORATORY  09/11/2019    Manual manipulation of bowel to remove pill bottle in rectum     lymph nodes removed from neck; benign  age 7     MAMMOPLASTY REDUCTION Bilateral      RELEASE CARPAL TUNNEL Bilateral      SIGMOIDOSCOPY FLEXIBLE N/A 1/10/2017    Procedure: SIGMOIDOSCOPY FLEXIBLE;  Surgeon: Annmarie Haynes MD;  [facility]     SIGMOIDOSCOPY FLEXIBLE N/A 5/8/2018    Procedure: SIGMOIDOSCOPY FLEXIBLE;  flex sig with foreign body removal using snare and rattooth forcep;  Surgeon: Soham Cano MD;  Location: [facility]     SIGMOIDOSCOPY FLEXIBLE N/A 2/20/2019    Procedure: Exam under anesthesia Colonoscopy with attempted  removal of rectal foreign body;  Surgeon: Estrada Chávez MD;  Location[facility]        Social History   Social History     Tobacco Use     Smoking status: Never Smoker     Smokeless tobacco: Never Used   Substance Use Topics     Alcohol use: No     Alcohol/week: 0.0 standard drinks     Drug use: No       Family History   I have reviewed this patient's family history and updated it with pertinent information if needed.   Family History   Problem Relation Age of Onset     Diabetes Type 2  Maternal Grandmother      Diabetes Type 2  Paternal Grandmother      Breast Cancer Paternal Grandmother      Cerebrovascular Disease Father 53     Myocardial Infarction No family hx of      Coronary Artery Disease Early Onset No family hx of      Ovarian Cancer No family hx of      Colon Cancer No family hx of        Prior to Admission Medications   Prior to Admission Medications    Prescriptions Last Dose Informant Patient Reported? Taking?   acetaminophen (TYLENOL) 32 mg/mL liquid   No No   Sig: Take 31.25 mLs (1,000 mg) by mouth every 6 hours as needed for fever or pain   albuterol (PROAIR HFA) 108 (90 Base) MCG/ACT inhaler  Self Yes No   Sig: Inhale 2 puffs into the lungs 4 times daily as needed    alum & mag hydroxide-simethicone (MYLANTA/MAALOX) 200-200-20 MG/5ML SUSP suspension   Yes No   Sig: Take 30 mLs by mouth every 6 hours as needed for indigestion   apixaban ANTICOAGULANT (ELIQUIS STARTER PACK) 5 MG tablet   No No   Sig: Take 2 tablets (10 mg) by mouth 2 times daily for 7 days, THEN 1 tablet (5 mg) 2 times daily for 23 days.   busPIRone (BUSPAR) 10 MG tablet  Self Yes No   Sig: Take 1 tablet (10 mg) by mouth twice daily   calcium carbonate (TUMS) 500 MG chewable tablet   Yes No   Sig: Take 1 chew tab by mouth 3 times daily as needed    cloNIDine (CATAPRES) 0.1 MG tablet  Self Yes No   Sig: Take 0.1 mg by mouth 2 times daily    desvenlafaxine (PRISTIQ) 100 MG 24 hr tablet  Self Yes No   Sig: Take 1 tablet (100 mg) by mouth every morning   diphenhydrAMINE (BENADRYL) 25 MG capsule  Self Yes No   Sig: Take 1-2 capsules (25-50 mg) by mouth every 6 hours as needed for itching or sleep   docosanol (ABREVA) 10 % CREA cream   Yes No   Sig: Apply to lip 5 times a day as soon as symptoms begin, do not use for more than 10 days.   gabapentin (NEURONTIN) 600 MG tablet  Self Yes No   Sig: Take 600 mg by mouth 3 times daily    hydrOXYzine (ATARAX) 25 MG tablet   No No   Sig: Take 1 tablet (25 mg) by mouth 3 times daily as needed for anxiety   levonorgestrel (MIRENA) 20 MCG/24HR IUD   Yes No   Si each by Intrauterine route once   lidocaine (XYLOCAINE) 2 % solution   No No   Sig: Swish and spit 15 mLs in mouth every 6 hours as needed (soar throat)   lurasidone (LATUDA) 60 MG TABS tablet  Self Yes No   Sig: Take 1 tablet (60 mg) by mouth at bedtime   metFORMIN (GLUCOPHAGE-XR) 500 MG 24 hr  tablet  Self Yes No   Sig: Take 1 tablet (500 mg) by mouth daily   ondansetron (ZOFRAN-ODT) 8 MG ODT tab  Self No No   Sig: Take 1 tablet (8 mg) by mouth every 6 hours as needed for nausea or vomiting   oxyCODONE (ROXICODONE) 5 MG tablet   Yes No   Sig: Take 5 mg by mouth every 4 hours as needed (pain) (patient rarely uses)   pantoprazole (PROTONIX) 40 MG EC tablet   No No   Sig: Take 1 tablet (40 mg) by mouth 2 times daily   simethicone (MYLICON) 80 MG chewable tablet   No No   Sig: Take 1 tablet (80 mg) by mouth every 6 hours as needed for flatulence or cramping (after meals)   sucralfate (CARAFATE) 1 GM/10ML suspension   No No   Sig: Take 10 mLs (1 g) by mouth 4 times daily   topiramate (TOPAMAX) 100 MG tablet  Self Yes No   Sig: Take 1 tablet (100 mg) by mouth daily at bedtime   vitamin D3 (CHOLECALCIFEROL) 2000 units (50 mcg) tablet  Self Yes No   Sig: Take 1 tablet (2,000 units) by mouth daily      Facility-Administered Medications: None     Allergies   Allergies   Allergen Reactions     Amoxicillin-Pot Clavulanate Other (See Comments), Rash and Swelling     PN: facial swelling, left side. Also had cortisone injection the same day as she started the Augmentin.  PN: facial swelling, left side. Also had cortisone injection the same day as she started the Augmentin.  Noted in downtime recovery of chart.       Penicillins Anaphylaxis     Vancomycin Swelling, Itching and Rash     Other reaction(s): Redness  Flushed face, very itchy; HUT Comment: Flushed face, very itchy; HUT Reaction: Angioedema; HUT Reaction: Redness; HUT Severity: Med; HUT Noted: [id number]  Flushed face, very itchy  Flushed face, very itchy  Flushed face, very itchy       Hydrocodone Nausea and Vomiting and GI Disturbance     vomiting for days  vomiting for days  vomiting for days  vomiting for days, PN: vomiting for days  vomiting for days  vomiting for days  vomiting for days  vomiting for days  vomiting for days  vomiting for days, PN: vomiting  for days  vomiting for days  vomiting for days  vomiting for days  vomiting for days  ; HUT Comment: vomiting for days; HUT Reaction: Gastrointestinal; HUT Reaction: Nausea And Vomiting; HUT Reaction Type: Intolerance; HUT Severity: Med; HUT Noted: [id number]  vomiting for days       Blood-Group Specific Substance Other (See Comments)     Patient has an anti-Cw and non-specific antibodies. Blood product orders may be delayed. Draw one red top and two purple top tubes for all type/screen/crossmatch orders.     Influenza Vaccines Other (See Comments)     Oseltamivir Hives     med stopped, PN: med stopped     Cephalosporins Rash     Lamotrigine Rash     Possibly associated with Lamictal.   HUT Comment: Possibly associated with Lamictal. ; HUT Reaction: Rash; HUT Reaction Type: Allergy; HUT Severity: Low; HUT Noted: [id number]     Latex Rash       Physical Exam   Vital Signs:     BP: (!) 147/94 Pulse: 102   Resp: 14 SpO2: 100 % O2 Device: None (Room air)    Weight: 242 lbs 0 oz    General Appearance: no acute distress, resting in bed  Eyes: slight injection bilaterally  HEENT: normocephalic, atraumatic, no oral lesions, nontender neck, no subcutaneous emphysema  Respiratory: clear to ascultation bilaterally, no wheezes or rales  Cardiovascular: regular rate and rhythm, no lower extremity pitting edema  GI: nontender abdomen, midline scar consistent with prior laparotomy  Lymph/Hematologic: no CLAD  Skin: no rash or jaundice  Musculoskeletal: moving all extremities spontaneously   Neurologic: AOx3  Psychiatric: flat affect, denies SI/HI/AVH     Assessment & Plan   Nevin Alvarado is a 29 year old female admitted on 1/12/2020. She has a history of depression, anxiety, PTSD, borderline personality disorder, and frequent emergency department visits for impulsive foreign body insertion in rectum/vagina or swallowing objects with subsequent laparotomy and esophageal perforation. She is admitted for a swallowed foreign  body.    # Recurrent impulsive foreign body ingestion: has hx of multiple foreign body ingestions/insertions (this is the 4th ED visit for the same in the month of January) with prior esophageal stent placement 10/9/2019 (now s/p removal) for esophageal perforation and prior laparotomy. This time, ingested pen spring, currently appears to be in stomach.   - GI to scope in morning, consulted, will need page in the morning, plan to do under general anesthesia   - NPO at midnight  - bedside attendant  - low threshold for re-imaging if worsens, given her history of perf     Recent provoked R interlobar/lower lobe PE on eliquis: Diagnosed in late Nov 2019 when she presented w hemoptysis. attributed to recent surgery and hospitalizations leading to prolonged immobilization. No DVT found. Plan to continue for 3 months per note (through the end of Feb 2020)  - holding eliquis now in anticipation of procedure, resume post-op     Complex psychocosocial history  Depression  anxiety  hx of PTSD  Denied her mood being worse. Denied recent change in her meds. Takes multiple psych meds, and followed by outside psychiatrist, therapist and behavioral analyst. She lives in an apartment by herself, but her sister lives in the next room.  - bedside attendant and psych consult as above  - continue home psych meds: buspar, clonidine, neurontin, latuda, pristiq and topamax + as needed atarax    # Pain Assessment:  Current Pain Score 1/5/2020   Patient currently in pain? yes   Pain score (0-10) -   Pain location -   Pain descriptors Cramping   Nevin s pain level was assessed and she currently denies pain.      Diet: NPO per Anesthesia Guidelines for Procedure/Surgery Except for: Meds  Fluids: NS at 125ml/hr  DVT Prophylaxis: on eliquis, holding for procedure  Code Status: Full Code    Disposition Plan   Expected discharge: Tomorrow; recommended to prior living arrangement once procedure performed and patient stable.Dispo:           Entered: Alva Blackman 01/13/2020, 3:55 AM   Information in the above section will display in the discharge planner report.    The patient was discussed with Dr. Jayjay Duran's [facility]    [facility]   Pager: [phone]  Please see sticky note for cross cover information      Data   Recent Results (from the past 24 hour(s))   XR Chest 1 View    Narrative    EXAMINATION: XR CHEST 1 VW, 1/12/2020 9:23 PM    INDICATION: Swallowed foreign body    COMPARISON: Chest x-ray 12/28/2019.    FINDINGS: Single portable AP radiograph of the chest. Right chest  Port-A-Cath with tip projecting over the cavoatrial junction. Trachea  is midline. The cardiomediastinal silhouette is within normal limits.  There is no pleural effusion. No pneumothorax. No focal consolidative  opacity. Partially visualized upper abdomen is unremarkable. Foreign  body is not appreciated on this exam, it is better seen on same-day  abdomen and pelvis radiograph. No free air in the abdomen. No  pneumatosis. No portal venous gas.      Impression    IMPRESSION:   1. No acute pulmonary pathology.  2. Foreign body is not appreciated on this radiograph, it is better  seen on same-day abdomen and pelvis radiograph.    I have personally reviewed the examination and initial interpretation  and I agree with the findings.    RICHELLE JACKSON MD   XR Abdomen 1 View    Narrative    Exam: XR ABDOMEN 1 VW, 1/12/2020 9:24 PM    Indication: Swallowed foreign body    Comparison: X-ray 1/5/2020.    Findings:   Single portable AP radiograph of the abdomen and upright positioning.  Redemonstration of foreign object which has progressed in positioning  and is now projecting over the left mid abdomen. Nonobstructive bowel  gas pattern. No pneumatosis. No pneumoperitoneum. No portal venous  gas. Unchanged S-curve of the visualized thoracic lumbar spine.      Impression    Impression: Single wire shaped metallic object now projects over  the  left midabdomen. No pneumatosis or free air.

## 2020-01-13 NOTE — ED PROVIDER NOTES
[facility] EMERGENCY DEPARTMENT ([facility])  1/12/20  History     Chief Complaint   Patient presents with     Swallowed Foreign Body     The history is provided by the patient and medical records.     Nevin Alvarado is a 29 year old female who presented to the Emergency Department after swallowing a pen spring that she had unwound.  She did this approximately an hour ago.  She has history of frequent ingestion of foreign bodies.  She complains of some abdominal pain, denies vomiting has no other complaints.    This part of the medical record was transcribed by Asher Vaughan Medical Scribe, from a dictation done by Cornelio Basurto MD.     I have reviewed the Medications, Allergies, Past Medical and Surgical History, and Social History in the [employer] system.    Past Medical History:   Diagnosis Date     ADD (attention deficit disorder)      Anorexia nervosa with bulimia     history of; on Topamax     Anxiety      Borderline personality disorder (H)      Depression      Depressive disorder      H/O self-harm      Lives in independent group home     due to debilitating mental illness     Migraine without aura     no known triggers; on Topamax bid and Imitrex PRN     Morbid obesity (H)      PTSD (post-traumatic stress disorder)      Rectal foreign body - Recurrent issue, self placed      Swallowed foreign body - Recurrent issue, self placed        Past Surgical History:   Procedure Laterality Date     COMBINED ESOPHAGOSCOPY, GASTROSCOPY, DUODENOSCOPY (EGD), REPLACE ESOPHAGEAL STENT N/A 10/9/2019    Procedure: Upper Endoscopy with Suture Placement;  Surgeon: Zurdo Ramirez MD;  Location:  OR     ESOPHAGOSCOPY, GASTROSCOPY, DUODENOSCOPY (EGD), COMBINED N/A 3/9/2017    Procedure: COMBINED ESOPHAGOSCOPY, GASTROSCOPY, DUODENOSCOPY (EGD), REMOVE FOREIGN BODY;  Surgeon: Avis Guzmán MD;  Location: [facility] OR     ESOPHAGOSCOPY, GASTROSCOPY, DUODENOSCOPY (EGD), COMBINED N/A 4/20/2017    Procedure:  COMBINED ESOPHAGOSCOPY, GASTROSCOPY, DUODENOSCOPY (EGD), REMOVE FOREIGN BODY;  EGD removal Foregin body;  Surgeon: Lokesh Paula MD;  Location: [facility] OR     ESOPHAGOSCOPY, GASTROSCOPY, DUODENOSCOPY (EGD), COMBINED N/A 6/12/2017    Procedure: COMBINED ESOPHAGOSCOPY, GASTROSCOPY, DUODENOSCOPY (EGD);  COMBINED ESOPHAGOSCOPY, GASTROSCOPY, DUODENOSCOPY (EGD) [[id number]]attempted removal of foreign body;  Surgeon: Pamela Perez MD;  Location: [facility] OR     ESOPHAGOSCOPY, GASTROSCOPY, DUODENOSCOPY (EGD), COMBINED N/A 6/9/2017    Procedure: COMBINED ESOPHAGOSCOPY, GASTROSCOPY, DUODENOSCOPY (EGD), REMOVE FOREIGN BODY;  Esophagoscopy, Gastroscopy, Duodenoscopy, Removal of Foreign Body;  Surgeon: Dejon Marsh MD;  Location: [facility] OR     ESOPHAGOSCOPY, GASTROSCOPY, DUODENOSCOPY (EGD), COMBINED N/A 1/6/2018    Procedure: COMBINED ESOPHAGOSCOPY, GASTROSCOPY, DUODENOSCOPY (EGD), REMOVE FOREIGN BODY;  COMBINED ESOPHAGOSCOPY, GASTROSCOPY, DUODENOSCOPY (EGD) [by pascal net and snare with profol sedation;  Surgeon: Feliciano Emmanuel MD;  Location:  GI     ESOPHAGOSCOPY, GASTROSCOPY, DUODENOSCOPY (EGD), COMBINED N/A 3/19/2018    Procedure: COMBINED ESOPHAGOSCOPY, GASTROSCOPY, DUODENOSCOPY (EGD), REMOVE FOREIGN BODY;   Esophagodscopy, Gastroscopy, Duodenoscopy,Foreign Body Removal;  Surgeon: Brice Guzmán MD;  Location: [facility]     ESOPHAGOSCOPY, GASTROSCOPY, DUODENOSCOPY (EGD), COMBINED N/A 4/16/2018    Procedure: COMBINED ESOPHAGOSCOPY, GASTROSCOPY, DUODENOSCOPY (EGD), REMOVE FOREIGN BODY;  Esophagogastroduodenoscopy  Foreign Body Removal X 2;  Surgeon: Royer Moise MD;  [facility]: [facility]     ESOPHAGOSCOPY, GASTROSCOPY, DUODENOSCOPY (EGD), COMBINED N/A 6/1/2018    Procedure: COMBINED ESOPHAGOSCOPY, GASTROSCOPY, DUODENOSCOPY (EGD), REMOVE FOREIGN BODY;  COMBINED ESOPHAGOSCOPY, GASTROSCOPY, DUODENOSCOPY with removal of foreign body, propofol sedation by anesthesia;  Surgeon: Brice Martinez,  MD;  Location:  GI     ESOPHAGOSCOPY, GASTROSCOPY, DUODENOSCOPY (EGD), COMBINED N/A 7/25/2018    Procedure: COMBINED ESOPHAGOSCOPY, GASTROSCOPY, DUODENOSCOPY (EGD), REMOVE FOREIGN BODY;;  Surgeon: Candy Castelan MD;  Location:  GI     ESOPHAGOSCOPY, GASTROSCOPY, DUODENOSCOPY (EGD), COMBINED N/A 7/28/2018    Procedure: COMBINED ESOPHAGOSCOPY, GASTROSCOPY, DUODENOSCOPY (EGD), REMOVE FOREIGN BODY;  COMBINED ESOPHAGOSCOPY, GASTROSCOPY, DUODENOSCOPY (EGD), REMOVE FOREIGN BODY;  Surgeon: Brice Guzmán MD;  [facility]     ESOPHAGOSCOPY, GASTROSCOPY, DUODENOSCOPY (EGD), COMBINED N/A 7/31/2018    Procedure: COMBINED ESOPHAGOSCOPY, GASTROSCOPY, DUODENOSCOPY (EGD);  COMBINED ESOPHAGOSCOPY, GASTROSCOPY, DUODENOSCOPY (EGD) TO REMOVE FOREIGN BODY;  Surgeon: Lokesh Paula MD;  [facility]     ESOPHAGOSCOPY, GASTROSCOPY, DUODENOSCOPY (EGD), COMBINED N/A 8/4/2018    Procedure: COMBINED ESOPHAGOSCOPY, GASTROSCOPY, DUODENOSCOPY (EGD), REMOVE FOREIGN BODY;   combined esophagoscopy, gastroscopy, duodenoscopy, REMOVE FOREIGN BODY ;  Surgeon: Lokesh Paula MD;  [facility]: [facility]     ESOPHAGOSCOPY, GASTROSCOPY, DUODENOSCOPY (EGD), COMBINED N/A 10/6/2019    Procedure: ESOPHAGOGASTRODUODENOSCOPY (EGD) with fireign body removal x2, esophageal stent placement with floroscopy;  Surgeon: Timoteo Espana MD;  Location: [facility]     ESOPHAGOSCOPY, GASTROSCOPY, DUODENOSCOPY (EGD), COMBINED N/A 12/2/2019    Procedure: Esophagogastroduodenoscopy with esophageal stent removal, esophogram;  Surgeon: Kailee Tena MD;  [facility]: [facility]     ESOPHAGOSCOPY, GASTROSCOPY, DUODENOSCOPY (EGD), COMBINED N/A 12/17/2019    Procedure: ESOPHAGOGASTRODUODENOSCOPY, WITH FOREIGN BODY REMOVAL;  Surgeon: Pamela Perez MD;  [facility]     ESOPHAGOSCOPY, GASTROSCOPY, DUODENOSCOPY (EGD), COMBINED N/A 12/13/2019    Procedure: ESOPHAGOGASTRODUODENOSCOPY, WITH FOREIGN BODY REMOVAL;  Surgeon: Samia Stanton,  MD;  [facility]: [facility]     ESOPHAGOSCOPY, GASTROSCOPY, DUODENOSCOPY (EGD), COMBINED N/A 12/28/2019    Procedure: ESOPHAGOGASTRODUODENOSCOPY (EGD) Removal of Foreign Body X 2;  Surgeon: Huy Kelley MD;  [facility]     ESOPHAGOSCOPY, GASTROSCOPY, DUODENOSCOPY (EGD), COMBINED N/A 1/5/2020    Procedure: ESOPHAGOGASTRODUOENOSCOPY WITH FOREIGN BODY REMOVAL;  Surgeon: Pamela Perez MD;  [facility]     ESOPHAGOSCOPY, GASTROSCOPY, DUODENOSCOPY (EGD), COMBINED N/A 1/3/2020    Procedure: ESOPHAGOGASTRODUODENOSCOPY (EGD) REMOVAL OF FOREIGN BODY.;  Surgeon: Pamela Perez MD;  [facility]     EXAM UNDER ANESTHESIA ANUS N/A 1/10/2017    Procedure: EXAM UNDER ANESTHESIA ANUS;  Surgeon: Annmarie Haynes MD;  [facility]     EXAM UNDER ANESTHESIA RECTUM N/A 7/19/2018    Procedure: EXAM UNDER ANESTHESIA RECTUM;  EXAM UNDER ANESTHESIA, REMOVAL OF RECTAL FOREIGN BODY;  Surgeon: Annmarie Haynes MD;  [facility]: [facility]     HC REMOVE FECAL IMPACTION OR FB W ANESTHESIA N/A 12/18/2016    Procedure: REMOVE FECAL IMPACTION/FOREIGN BODY UNDER ANESTHESIA;  Surgeon: Soham Cano MD;  Location: [facility] OR     KNEE SURGERY - removed a small tissue mass from knee Right      LAPAROSCOPIC ABLATION ENDOMETRIOSIS       LAPAROTOMY EXPLORATORY N/A 1/10/2017    Procedure: LAPAROTOMY EXPLORATORY;  Surgeon: Annmarie Haynes MD;  Location: [facility]     LAPAROTOMY EXPLORATORY  09/11/2019    Manual manipulation of bowel to remove pill bottle in rectum     lymph nodes removed from neck; benign  age 7     MAMMOPLASTY REDUCTION Bilateral      RELEASE CARPAL TUNNEL Bilateral      SIGMOIDOSCOPY FLEXIBLE N/A 1/10/2017    Procedure: SIGMOIDOSCOPY FLEXIBLE;  Surgeon: Annmarie Haynes MD;  [facility]: [facility]     SIGMOIDOSCOPY FLEXIBLE N/A 5/8/2018    Procedure: SIGMOIDOSCOPY FLEXIBLE;  flex sig with foreign body removal using snare and rattooth forcep;  Surgeon: Soham Cano MD;   Location:  GI     SIGMOIDOSCOPY FLEXIBLE N/A 2/20/2019    Procedure: Exam under anesthesia Colonoscopy with attempted  removal of rectal foreign body;  Surgeon: Estrada Chávez MD;  Location: [facility]       Family History   Problem Relation Age of Onset     Diabetes Type 2  Maternal Grandmother      Diabetes Type 2  Paternal Grandmother      Breast Cancer Paternal Grandmother      Cerebrovascular Disease Father 53     Myocardial Infarction No family hx of      Coronary Artery Disease Early Onset No family hx of      Ovarian Cancer No family hx of      Colon Cancer No family hx of        Social History     Tobacco Use     Smoking status: Never Smoker     Smokeless tobacco: Never Used   Substance Use Topics     Alcohol use: No     Alcohol/week: 0.0 standard drinks       Current Facility-Administered Medications   Medication     LORazepam (ATIVAN) injection 0.5 mg     sodium chloride 0.9% infusion     Current Outpatient Medications   Medication     acetaminophen (TYLENOL) 32 mg/mL liquid     albuterol (PROAIR HFA) 108 (90 Base) MCG/ACT inhaler     alum & mag hydroxide-simethicone (MYLANTA/MAALOX) 200-200-20 MG/5ML SUSP suspension     busPIRone (BUSPAR) 10 MG tablet     calcium carbonate (TUMS) 500 MG chewable tablet     cloNIDine (CATAPRES) 0.1 MG tablet     desvenlafaxine (PRISTIQ) 100 MG 24 hr tablet     diphenhydrAMINE (BENADRYL) 25 MG capsule     docosanol (ABREVA) 10 % CREA cream     gabapentin (NEURONTIN) 600 MG tablet     levonorgestrel (MIRENA) 20 MCG/24HR IUD     lidocaine (XYLOCAINE) 2 % solution     lurasidone (LATUDA) 60 MG TABS tablet     metFORMIN (GLUCOPHAGE-XR) 500 MG 24 hr tablet     ondansetron (ZOFRAN-ODT) 8 MG ODT tab     oxyCODONE (ROXICODONE) 5 MG tablet     pantoprazole (PROTONIX) 40 MG EC tablet     simethicone (MYLICON) 80 MG chewable tablet     sucralfate (CARAFATE) 1 GM/10ML suspension     topiramate (TOPAMAX) 100 MG tablet     vitamin D3 (CHOLECALCIFEROL) 2000 units (50 mcg) tablet         Allergies   Allergen Reactions     Amoxicillin-Pot Clavulanate Other (See Comments), Rash and Swelling     PN: facial swelling, left side. Also had cortisone injection the same day as she started the Augmentin.  PN: facial swelling, left side. Also had cortisone injection the same day as she started the Augmentin.  Noted in downtime recovery of chart.       Penicillins Anaphylaxis     Vancomycin Swelling, Itching and Rash     Other reaction(s): Redness  Flushed face, very itchy; HUT Comment: Flushed face, very itchy; HUT Reaction: Angioedema; HUT Reaction: Redness; HUT Severity: Med; HUT Noted: [id number]  Flushed face, very itchy  Flushed face, very itchy  Flushed face, very itchy       Hydrocodone Nausea and Vomiting and GI Disturbance     vomiting for days  vomiting for days  vomiting for days  vomiting for days, PN: vomiting for days  vomiting for days  vomiting for days  vomiting for days  vomiting for days  vomiting for days  vomiting for days, PN: vomiting for days  vomiting for days  vomiting for days  vomiting for days  vomiting for days  ; HUT Comment: vomiting for days; HUT Reaction: Gastrointestinal; HUT Reaction: Nausea And Vomiting; HUT Reaction Type: Intolerance; HUT Severity: Med; HUT Noted: [id number]  vomiting for days       Blood-Group Specific Substance Other (See Comments)     Patient has an anti-Cw and non-specific antibodies. Blood product orders may be delayed. Draw one red top and two purple top tubes for all type/screen/crossmatch orders.     Influenza Vaccines Other (See Comments)     Oseltamivir Hives     med stopped, PN: med stopped     Cephalosporins Rash     Lamotrigine Rash     Possibly associated with Lamictal.   HUT Comment: Possibly associated with Lamictal. ; HUT Reaction: Rash; HUT Reaction Type: Allergy; HUT Severity: Low; HUT Noted: [id number]     Latex Rash         Review of Systems   Gastrointestinal: Positive for abdominal pain. Negative for vomiting.   All other systems  "reviewed and are negative.      Physical Exam   BP: (!) 147/94  Pulse: 102  Resp: 14  Height: 157.5 cm (5' 2\")  Weight: 109.8 kg (242 lb)  SpO2: 100 %      Physical Exam  Vitals signs and nursing note reviewed.   Constitutional:       General: She is not in acute distress.     Appearance: She is well-developed. She is not ill-appearing, toxic-appearing or diaphoretic.   HENT:      Head: Normocephalic and atraumatic.      Mouth/Throat:      Lips: Pink.      Mouth: Mucous membranes are moist.      Pharynx: Oropharynx is clear. No oropharyngeal exudate.   Eyes:      General: Lids are normal. No scleral icterus.     Extraocular Movements: Extraocular movements intact.      Right eye: No nystagmus.      Left eye: No nystagmus.      Conjunctiva/sclera: Conjunctivae normal.      Pupils: Pupils are equal, round, and reactive to light.   Neck:      Musculoskeletal: Normal range of motion and neck supple. No erythema or neck rigidity.      Thyroid: No thyromegaly.      Vascular: No JVD.      Trachea: No tracheal deviation.   Cardiovascular:      Rate and Rhythm: Normal rate and regular rhythm.      Pulses: Normal pulses.      Heart sounds: Normal heart sounds. No murmur. No friction rub. No gallop.    Pulmonary:      Effort: Pulmonary effort is normal. No respiratory distress.      Breath sounds: Normal breath sounds.   Abdominal:      General: Bowel sounds are normal. There is no distension.      Palpations: Abdomen is soft. There is no mass.      Tenderness: There is no abdominal tenderness. There is no guarding or rebound.   Musculoskeletal: Normal range of motion.         General: No tenderness.      Right lower leg: No edema.      Left lower leg: No edema.   Lymphadenopathy:      Cervical: No cervical adenopathy.   Skin:     General: Skin is warm and dry.      Capillary Refill: Capillary refill takes less than 2 seconds.      Coloration: Skin is not pale.      Findings: No erythema or rash.   Neurological:      Mental " Status: She is alert and oriented to person, place, and time.      Cranial Nerves: No cranial nerve deficit.      Sensory: No sensory deficit.      Motor: Motor function is intact.   Psychiatric:         Mood and Affect: Mood and affect normal.         Speech: Speech normal.         Behavior: Behavior normal.         ED Course        Procedures        Results for orders placed or performed during the hospital encounter of 01/12/20 (from the past 24 hour(s))   XR Chest 1 View    Narrative    EXAMINATION: XR CHEST 1 VW, 1/12/2020 9:23 PM    INDICATION: Swallowed foreign body    COMPARISON: Chest x-ray 12/28/2019.    FINDINGS: Single portable AP radiograph of the chest. Right chest  Port-A-Cath with tip projecting over the cavoatrial junction. Trachea  is midline. The cardiomediastinal silhouette is within normal limits.  There is no pleural effusion. No pneumothorax. No focal consolidative  opacity. Partially visualized upper abdomen is unremarkable. Foreign  body is not appreciated on this exam, it is better seen on same-day  abdomen and pelvis radiograph. No free air in the abdomen. No  pneumatosis. No portal venous gas.      Impression    IMPRESSION:   1. No acute pulmonary pathology.  2. Foreign body is not appreciated on this radiograph, it is better  seen on same-day abdomen and pelvis radiograph.    I have personally reviewed the examination and initial interpretation  and I agree with the findings.    RICHELLE JACKSON MD   XR Abdomen 1 View    Narrative    Exam: XR ABDOMEN 1 VW, 1/12/2020 9:24 PM    Indication: Swallowed foreign body    Comparison: X-ray 1/5/2020.    Findings:   Single portable AP radiograph of the abdomen and upright positioning.  Redemonstration of foreign object which has progressed in positioning  and is now projecting over the left mid abdomen. Nonobstructive bowel  gas pattern. No pneumatosis. No pneumoperitoneum. No portal venous  gas. Unchanged S-curve of the visualized thoracic lumbar  spine.      Impression    Impression: Single wire shaped metallic object now projects over the  left midabdomen. No pneumatosis or free air.            Assessments & Plan (with Medical Decision Making)   This patient presented to the Emergency Department after ingesting a unraveled spring from a pen.  She did not appear in acute distress and had a benign abdominal exam, x-ray demonstrated retained foreign body in the stomach but no evidence of free air perforation.  I discussed case with gastroenterology and the fellow spoke with his staff.  Apparently in the past when they removed a similar appearing object she did suffer a laceration of her esophagus so they felt it was more prudent to do endoscopy and retrieval in the operating room in the morning with thoracic surgery involved.  I spoke with the internist on-call and patient will be admitted to their service for further treatment.    This part of the medical record was transcribed by Asher Vaughan, Medical Scribe, from a dictation done by Cornelio Basurto MD.     I have reviewed the nursing notes.    I have reviewed the findings, diagnosis, plan and need for follow up with the patient.    New Prescriptions    No medications on file       Final diagnoses:   Swallowed foreign body, initial encounter     I, Asher Vaughan, am serving as a trained medical scribe to document services personally performed by Cornelio Basurto MD, based on the provider's statements to me.   I, Cornelio Basurto MD, was physically present and have reviewed and verified the accuracy of this note documented by Asher Vaughan.    1/12/2020   [facility], [place], EMERGENCY DEPARTMENT     Cornelio Basurto MD  01/13/20 0206

## 2020-01-13 NOTE — OR NURSING
Kody GRIMES at bedside, stated that patient can advance diet to a general diet and that she can also be discharge home today. Pt stated that she can call her insurance to drive her home and her sister can stay with her.

## 2020-01-13 NOTE — ED NOTES
Bed: [facility]  Expected date:   Expected time:   Means of arrival: Ambulance  Comments:  29 F, swallowed a spring from a pen.

## 2020-01-14 ENCOUNTER — TELEPHONE (OUTPATIENT)
Dept: INTERNAL MEDICINE | Facility: CLINIC | Age: 30
End: 2020-01-14

## 2020-01-14 NOTE — TELEPHONE ENCOUNTER
Patient followed by PCP through [facility].  No outreaches will be made by Care Coordination at this time.

## 2020-01-14 NOTE — TELEPHONE ENCOUNTER
Please call patient for hospital follow up.    I don't think Dr. Ramos sees this patient anymore. Her PCP is ADITI Knight.

## 2020-01-17 ENCOUNTER — ANESTHESIA (OUTPATIENT)
Dept: SURGERY | Facility: CLINIC | Age: 30
DRG: 394 | End: 2020-01-17
Payer: COMMERCIAL

## 2020-01-17 ENCOUNTER — ANESTHESIA EVENT (OUTPATIENT)
Dept: SURGERY | Facility: CLINIC | Age: 30
DRG: 394 | End: 2020-01-17
Payer: COMMERCIAL

## 2020-01-17 ENCOUNTER — APPOINTMENT (OUTPATIENT)
Dept: GENERAL RADIOLOGY | Facility: CLINIC | Age: 30
DRG: 394 | End: 2020-01-17
Attending: EMERGENCY MEDICINE
Payer: COMMERCIAL

## 2020-01-17 ENCOUNTER — HOSPITAL ENCOUNTER (INPATIENT)
Facility: CLINIC | Age: 30
LOS: 1 days | Discharge: HOME | DRG: 394 | End: 2020-01-18
Attending: EMERGENCY MEDICINE | Admitting: STUDENT IN AN ORGANIZED HEALTH CARE EDUCATION/TRAINING PROGRAM
Payer: COMMERCIAL

## 2020-01-17 DIAGNOSIS — F60.3 EXPLOSIVE PERSONALITY DISORDER (H): ICD-10-CM

## 2020-01-17 DIAGNOSIS — T18.9XXD SWALLOWED FOREIGN BODY, SUBSEQUENT ENCOUNTER: ICD-10-CM

## 2020-01-17 DIAGNOSIS — F33.1 MAJOR DEPRESSIVE DISORDER, RECURRENT EPISODE, MODERATE (H): ICD-10-CM

## 2020-01-17 DIAGNOSIS — Z86.59 HISTORY OF POSTTRAUMATIC STRESS DISORDER (PTSD): Primary | ICD-10-CM

## 2020-01-17 DIAGNOSIS — I26.99 ACUTE PULMONARY EMBOLISM WITHOUT ACUTE COR PULMONALE, UNSPECIFIED PULMONARY EMBOLISM TYPE (H): ICD-10-CM

## 2020-01-17 DIAGNOSIS — F41.1 GENERALIZED ANXIETY DISORDER: ICD-10-CM

## 2020-01-17 DIAGNOSIS — T18.9XXA SWALLOWED FOREIGN BODY, INITIAL ENCOUNTER: ICD-10-CM

## 2020-01-17 LAB
ALBUMIN SERPL-MCNC: 3.4 G/DL (ref 3.4–5)
ALP SERPL-CCNC: 79 U/L (ref 40–150)
ALT SERPL W P-5'-P-CCNC: 26 U/L (ref 0–50)
ANION GAP SERPL CALCULATED.3IONS-SCNC: 8 MMOL/L (ref 3–14)
APTT PPP: 34 SEC (ref 22–37)
AST SERPL W P-5'-P-CCNC: 16 U/L (ref 0–45)
BASOPHILS # BLD AUTO: 0 10E9/L (ref 0–0.2)
BASOPHILS NFR BLD AUTO: 0.3 %
BILIRUB SERPL-MCNC: 0.2 MG/DL (ref 0.2–1.3)
BUN SERPL-MCNC: 9 MG/DL (ref 7–30)
CALCIUM SERPL-MCNC: 8.9 MG/DL (ref 8.5–10.1)
CHLORIDE SERPL-SCNC: 109 MMOL/L (ref 94–109)
CO2 SERPL-SCNC: 23 MMOL/L (ref 20–32)
CREAT SERPL-MCNC: 0.56 MG/DL (ref 0.52–1.04)
DIFFERENTIAL METHOD BLD: NORMAL
EOSINOPHIL # BLD AUTO: 0.1 10E9/L (ref 0–0.7)
EOSINOPHIL NFR BLD AUTO: 1.3 %
ERYTHROCYTE [DISTWIDTH] IN BLOOD BY AUTOMATED COUNT: 14 % (ref 10–15)
GFR SERPL CREATININE-BSD FRML MDRD: >90 ML/MIN/{1.73_M2}
GLUCOSE SERPL-MCNC: 92 MG/DL (ref 70–99)
HCT VFR BLD AUTO: 37.2 % (ref 35–47)
HGB BLD-MCNC: 11.9 G/DL (ref 11.7–15.7)
IMM GRANULOCYTES # BLD: 0 10E9/L (ref 0–0.4)
IMM GRANULOCYTES NFR BLD: 0.3 %
INR PPP: 1.16 (ref 0.86–1.14)
LYMPHOCYTES # BLD AUTO: 2.4 10E9/L (ref 0.8–5.3)
LYMPHOCYTES NFR BLD AUTO: 23.2 %
MCH RBC QN AUTO: 28.7 PG (ref 26.5–33)
MCHC RBC AUTO-ENTMCNC: 32 G/DL (ref 31.5–36.5)
MCV RBC AUTO: 90 FL (ref 78–100)
MONOCYTES # BLD AUTO: 0.7 10E9/L (ref 0–1.3)
MONOCYTES NFR BLD AUTO: 6.7 %
NEUTROPHILS # BLD AUTO: 6.9 10E9/L (ref 1.6–8.3)
NEUTROPHILS NFR BLD AUTO: 68.2 %
NRBC # BLD AUTO: 0 10*3/UL
NRBC BLD AUTO-RTO: 0 /100
PLATELET # BLD AUTO: 328 10E9/L (ref 150–450)
POTASSIUM SERPL-SCNC: 3.5 MMOL/L (ref 3.4–5.3)
PROT SERPL-MCNC: 7.5 G/DL (ref 6.8–8.8)
RBC # BLD AUTO: 4.15 10E12/L (ref 3.8–5.2)
SODIUM SERPL-SCNC: 139 MMOL/L (ref 133–144)
WBC # BLD AUTO: 10.1 10E9/L (ref 4–11)

## 2020-01-17 PROCEDURE — 99207 ZZC APP CREDIT; MD BILLING SHARED VISIT: CPT | Performed by: STUDENT IN AN ORGANIZED HEALTH CARE EDUCATION/TRAINING PROGRAM

## 2020-01-17 PROCEDURE — 25000128 H RX IP 250 OP 636: Performed by: EMERGENCY MEDICINE

## 2020-01-17 PROCEDURE — 90791 PSYCH DIAGNOSTIC EVALUATION: CPT

## 2020-01-17 PROCEDURE — 25000132 ZZH RX MED GY IP 250 OP 250 PS 637: Performed by: NURSE PRACTITIONER

## 2020-01-17 PROCEDURE — 96375 TX/PRO/DX INJ NEW DRUG ADDON: CPT | Performed by: EMERGENCY MEDICINE

## 2020-01-17 PROCEDURE — 85025 COMPLETE CBC W/AUTO DIFF WBC: CPT | Performed by: EMERGENCY MEDICINE

## 2020-01-17 PROCEDURE — 96374 THER/PROPH/DIAG INJ IV PUSH: CPT | Performed by: EMERGENCY MEDICINE

## 2020-01-17 PROCEDURE — 12000001 ZZH R&B MED SURG/OB UMMC

## 2020-01-17 PROCEDURE — 99285 EMERGENCY DEPT VISIT HI MDM: CPT | Mod: 25 | Performed by: EMERGENCY MEDICINE

## 2020-01-17 PROCEDURE — 85730 THROMBOPLASTIN TIME PARTIAL: CPT | Performed by: EMERGENCY MEDICINE

## 2020-01-17 PROCEDURE — 74019 RADEX ABDOMEN 2 VIEWS: CPT

## 2020-01-17 PROCEDURE — 99207 ZZC APP CREDIT; MD BILLING SHARED VISIT: CPT | Performed by: NURSE PRACTITIONER

## 2020-01-17 PROCEDURE — 85610 PROTHROMBIN TIME: CPT | Performed by: EMERGENCY MEDICINE

## 2020-01-17 PROCEDURE — 99285 EMERGENCY DEPT VISIT HI MDM: CPT | Mod: Z6 | Performed by: EMERGENCY MEDICINE

## 2020-01-17 PROCEDURE — 80053 COMPREHEN METABOLIC PANEL: CPT | Performed by: EMERGENCY MEDICINE

## 2020-01-17 PROCEDURE — 99223 1ST HOSP IP/OBS HIGH 75: CPT | Mod: AI | Performed by: NURSE PRACTITIONER

## 2020-01-17 RX ORDER — CLONIDINE HYDROCHLORIDE 0.1 MG/1
0.1 TABLET ORAL 2 TIMES DAILY
Status: DISCONTINUED | OUTPATIENT
Start: 2020-01-17 | End: 2020-01-18 | Stop reason: HOSPADM

## 2020-01-17 RX ORDER — LORAZEPAM 2 MG/ML
1 INJECTION INTRAMUSCULAR ONCE
Status: COMPLETED | OUTPATIENT
Start: 2020-01-17 | End: 2020-01-17

## 2020-01-17 RX ORDER — HYDROXYZINE HYDROCHLORIDE 50 MG/1
50 TABLET, FILM COATED ORAL 3 TIMES DAILY PRN
Status: DISCONTINUED | OUTPATIENT
Start: 2020-01-17 | End: 2020-01-18 | Stop reason: HOSPADM

## 2020-01-17 RX ORDER — GABAPENTIN 600 MG/1
600 TABLET ORAL 3 TIMES DAILY
Status: DISCONTINUED | OUTPATIENT
Start: 2020-01-18 | End: 2020-01-18 | Stop reason: HOSPADM

## 2020-01-17 RX ORDER — ONDANSETRON 2 MG/ML
4 INJECTION INTRAMUSCULAR; INTRAVENOUS EVERY 30 MIN PRN
Status: DISCONTINUED | OUTPATIENT
Start: 2020-01-17 | End: 2020-01-17

## 2020-01-17 RX ORDER — ALBUTEROL SULFATE 90 UG/1
2 AEROSOL, METERED RESPIRATORY (INHALATION) EVERY 4 HOURS PRN
Status: DISCONTINUED | OUTPATIENT
Start: 2020-01-17 | End: 2020-01-18 | Stop reason: HOSPADM

## 2020-01-17 RX ORDER — DIPHENHYDRAMINE HCL 25 MG
25 CAPSULE ORAL EVERY 6 HOURS PRN
Status: DISCONTINUED | OUTPATIENT
Start: 2020-01-17 | End: 2020-01-18 | Stop reason: HOSPADM

## 2020-01-17 RX ORDER — SIMETHICONE 80 MG
80 TABLET,CHEWABLE ORAL EVERY 6 HOURS PRN
Status: DISCONTINUED | OUTPATIENT
Start: 2020-01-17 | End: 2020-01-18 | Stop reason: HOSPADM

## 2020-01-17 RX ORDER — TOPIRAMATE 25 MG/1
100 TABLET, FILM COATED ORAL AT BEDTIME
Status: DISCONTINUED | OUTPATIENT
Start: 2020-01-17 | End: 2020-01-18 | Stop reason: HOSPADM

## 2020-01-17 RX ORDER — ONDANSETRON 2 MG/ML
4 INJECTION INTRAMUSCULAR; INTRAVENOUS EVERY 6 HOURS PRN
Status: DISCONTINUED | OUTPATIENT
Start: 2020-01-17 | End: 2020-01-18 | Stop reason: HOSPADM

## 2020-01-17 RX ORDER — METFORMIN HCL 500 MG
500 TABLET, EXTENDED RELEASE 24 HR ORAL
Status: DISCONTINUED | OUTPATIENT
Start: 2020-01-18 | End: 2020-01-18 | Stop reason: HOSPADM

## 2020-01-17 RX ORDER — NALOXONE HYDROCHLORIDE 0.4 MG/ML
.1-.4 INJECTION, SOLUTION INTRAMUSCULAR; INTRAVENOUS; SUBCUTANEOUS
Status: DISCONTINUED | OUTPATIENT
Start: 2020-01-17 | End: 2020-01-18 | Stop reason: HOSPADM

## 2020-01-17 RX ORDER — LIDOCAINE 40 MG/G
CREAM TOPICAL
Status: DISCONTINUED | OUTPATIENT
Start: 2020-01-17 | End: 2020-01-18 | Stop reason: HOSPADM

## 2020-01-17 RX ORDER — CALCIUM CARBONATE 500 MG/1
500 TABLET, CHEWABLE ORAL 3 TIMES DAILY PRN
Status: DISCONTINUED | OUTPATIENT
Start: 2020-01-17 | End: 2020-01-18 | Stop reason: HOSPADM

## 2020-01-17 RX ORDER — PROCHLORPERAZINE MALEATE 10 MG
10 TABLET ORAL EVERY 6 HOURS PRN
Status: DISCONTINUED | OUTPATIENT
Start: 2020-01-17 | End: 2020-01-18 | Stop reason: HOSPADM

## 2020-01-17 RX ORDER — ONDANSETRON 4 MG/1
4 TABLET, ORALLY DISINTEGRATING ORAL EVERY 6 HOURS PRN
Status: DISCONTINUED | OUTPATIENT
Start: 2020-01-17 | End: 2020-01-18 | Stop reason: HOSPADM

## 2020-01-17 RX ORDER — PROCHLORPERAZINE 25 MG
25 SUPPOSITORY, RECTAL RECTAL EVERY 12 HOURS PRN
Status: DISCONTINUED | OUTPATIENT
Start: 2020-01-17 | End: 2020-01-18 | Stop reason: HOSPADM

## 2020-01-17 RX ORDER — MORPHINE SULFATE 4 MG/ML
4 INJECTION, SOLUTION INTRAMUSCULAR; INTRAVENOUS ONCE
Status: DISCONTINUED | OUTPATIENT
Start: 2020-01-17 | End: 2020-01-17

## 2020-01-17 RX ORDER — DESVENLAFAXINE 50 MG/1
100 TABLET, FILM COATED, EXTENDED RELEASE ORAL DAILY
Status: DISCONTINUED | OUTPATIENT
Start: 2020-01-18 | End: 2020-01-18

## 2020-01-17 RX ORDER — SUCRALFATE ORAL 1 G/10ML
1 SUSPENSION ORAL 4 TIMES DAILY
Status: DISCONTINUED | OUTPATIENT
Start: 2020-01-18 | End: 2020-01-18 | Stop reason: HOSPADM

## 2020-01-17 RX ORDER — PANTOPRAZOLE SODIUM 40 MG/1
40 TABLET, DELAYED RELEASE ORAL
Status: DISCONTINUED | OUTPATIENT
Start: 2020-01-18 | End: 2020-01-18 | Stop reason: HOSPADM

## 2020-01-17 RX ORDER — OXYCODONE HYDROCHLORIDE 5 MG/1
5 TABLET ORAL EVERY 4 HOURS PRN
Status: DISCONTINUED | OUTPATIENT
Start: 2020-01-17 | End: 2020-01-18 | Stop reason: HOSPADM

## 2020-01-17 RX ORDER — MORPHINE SULFATE 4 MG/ML
4 INJECTION, SOLUTION INTRAMUSCULAR; INTRAVENOUS ONCE
Status: COMPLETED | OUTPATIENT
Start: 2020-01-17 | End: 2020-01-17

## 2020-01-17 RX ORDER — BUSPIRONE HYDROCHLORIDE 10 MG/1
10 TABLET ORAL 2 TIMES DAILY
Status: DISCONTINUED | OUTPATIENT
Start: 2020-01-17 | End: 2020-01-18 | Stop reason: HOSPADM

## 2020-01-17 RX ORDER — ALUMINA, MAGNESIA, AND SIMETHICONE 2400; 2400; 240 MG/30ML; MG/30ML; MG/30ML
20 SUSPENSION ORAL EVERY 6 HOURS PRN
Status: DISCONTINUED | OUTPATIENT
Start: 2020-01-17 | End: 2020-01-18 | Stop reason: HOSPADM

## 2020-01-17 RX ADMIN — APIXABAN 5 MG: 5 TABLET, FILM COATED ORAL at 23:44

## 2020-01-17 RX ADMIN — LORAZEPAM 1 MG: 2 INJECTION INTRAMUSCULAR; INTRAVENOUS at 22:01

## 2020-01-17 RX ADMIN — MORPHINE SULFATE 4 MG: 4 INJECTION INTRAVENOUS at 20:41

## 2020-01-17 RX ADMIN — OXYCODONE HYDROCHLORIDE 5 MG: 5 TABLET ORAL at 23:43

## 2020-01-17 RX ADMIN — CLONIDINE HYDROCHLORIDE 0.1 MG: 0.1 TABLET ORAL at 23:43

## 2020-01-17 RX ADMIN — ONDANSETRON 4 MG: 2 INJECTION INTRAMUSCULAR; INTRAVENOUS at 20:41

## 2020-01-17 RX ADMIN — BUSPIRONE HYDROCHLORIDE 10 MG: 10 TABLET ORAL at 23:44

## 2020-01-17 ASSESSMENT — MIFFLIN-ST. JEOR: SCORE: 1780.95

## 2020-01-17 NOTE — ED TRIAGE NOTES
"Pt arrived in triage with concerns of a swallowed foreign body. Pt says she swallowed a pen spring two hours prior. Pt says she swallowed a pen spring because she was feeling anxious. Pt says she has been taking atarax for anxiety for the past week, but still feels anxious. Pt says she also has just been starting to see a therapist, but hasn't talked about ways of dealing with anxiety yet because it has just been \" a lot of paperwork\".   "

## 2020-01-17 NOTE — ED PROVIDER NOTES
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  The patient states that she swallowed a pen spring 2 hours ago.  She states that she did this because she was feeling anxious.  The patient has been taking Atarax without relief for her anxiety over the last week.  The patient states that she has started to see a therapist, but has not talked about other ways of dealing with her anxiety yet.      Pt was d/w social work who discussed pt with her court appointed guardian. Pt lives in a mental health group home, but has her own apartment where she is unsupervised. She has extensive resources outpatient. She has a history of self injurious behavior without suicidal ideation. Guardian is wondering if patient would be able to be placed on a hold to see if inpatient admission would be beneficial for the patient and in correcting her behavior. Pt did have some DBT before but lost her book and so she no longer has this information.     Last ate chocolate around noon, no other PO intake since.     I have reviewed the Medications, Allergies, Past Medical and Surgical History, and Social History in the [employer] system.  Past Medical History:   Diagnosis Date     ADD (attention deficit disorder)      Anorexia nervosa with bulimia     history of; on Topamax     Anxiety      Borderline personality disorder (H)      Depression      Depressive disorder      H/O self-harm      Lives in independent group home     due to debilitating mental illness     Migraine without aura     no known triggers; on Topamax bid and Imitrex PRN     Morbid  obesity (H)      PTSD (post-traumatic stress disorder)      Rectal foreign body - Recurrent issue, self placed      Swallowed foreign body - Recurrent issue, self placed      Past Surgical History:   Procedure Laterality Date     COMBINED ESOPHAGOSCOPY, GASTROSCOPY, DUODENOSCOPY (EGD), REPLACE ESOPHAGEAL STENT N/A 10/9/2019    Procedure: Upper Endoscopy with Suture Placement;  Surgeon: Zurdo Ramirez MD;  [facility]     ESOPHAGOSCOPY, GASTROSCOPY, DUODENOSCOPY (EGD), COMBINED N/A 3/9/2017    Procedure: COMBINED ESOPHAGOSCOPY, GASTROSCOPY, DUODENOSCOPY (EGD), REMOVE FOREIGN BODY;  Surgeon: Avis Guzmán MD;  [facility]     ESOPHAGOSCOPY, GASTROSCOPY, DUODENOSCOPY (EGD), COMBINED N/A 4/20/2017    Procedure: COMBINED ESOPHAGOSCOPY, GASTROSCOPY, DUODENOSCOPY (EGD), REMOVE FOREIGN BODY;  EGD removal Foregin body;  Surgeon: Lokesh Paula MD;  [facility]     ESOPHAGOSCOPY, GASTROSCOPY, DUODENOSCOPY (EGD), COMBINED N/A 6/12/2017    Procedure: COMBINED ESOPHAGOSCOPY, GASTROSCOPY, DUODENOSCOPY (EGD);  COMBINED ESOPHAGOSCOPY, GASTROSCOPY, DUODENOSCOPY (EGD) [[id number]]attempted removal of foreign body;  Surgeon: Pamela Perez MD;  [facility]     ESOPHAGOSCOPY, GASTROSCOPY, DUODENOSCOPY (EGD), COMBINED N/A 6/9/2017    Procedure: COMBINED ESOPHAGOSCOPY, GASTROSCOPY, DUODENOSCOPY (EGD), REMOVE FOREIGN BODY;  Esophagoscopy, Gastroscopy, Duodenoscopy, Removal of Foreign Body;  Surgeon: Dejon Marsh MD;  [facility]     ESOPHAGOSCOPY, GASTROSCOPY, DUODENOSCOPY (EGD), COMBINED N/A 1/6/2018    Procedure: COMBINED ESOPHAGOSCOPY, GASTROSCOPY, DUODENOSCOPY (EGD), REMOVE FOREIGN BODY;  COMBINED ESOPHAGOSCOPY, GASTROSCOPY, DUODENOSCOPY (EGD) [by pascal net and snare with profol sedation;  Surgeon: Feliciano Emmanuel MD;  Location:  GI     ESOPHAGOSCOPY, GASTROSCOPY, DUODENOSCOPY (EGD), COMBINED N/A 3/19/2018    Procedure: COMBINED ESOPHAGOSCOPY,  GASTROSCOPY, DUODENOSCOPY (EGD), REMOVE FOREIGN BODY;   Esophagodscopy, Gastroscopy, Duodenoscopy,Foreign Body Removal;  Surgeon: Brice Guzmán MD;  [facility]     ESOPHAGOSCOPY, GASTROSCOPY, DUODENOSCOPY (EGD), COMBINED N/A 4/16/2018    Procedure: COMBINED ESOPHAGOSCOPY, GASTROSCOPY, DUODENOSCOPY (EGD), REMOVE FOREIGN BODY;  Esophagogastroduodenoscopy  Foreign Body Removal X 2;  Surgeon: Royer Moise MD;  [facility]     ESOPHAGOSCOPY, GASTROSCOPY, DUODENOSCOPY (EGD), COMBINED N/A 6/1/2018    Procedure: COMBINED ESOPHAGOSCOPY, GASTROSCOPY, DUODENOSCOPY (EGD), REMOVE FOREIGN BODY;  COMBINED ESOPHAGOSCOPY, GASTROSCOPY, DUODENOSCOPY with removal of foreign body, propofol sedation by anesthesia;  Surgeon: Brice Martinez MD;  Location:  GI     ESOPHAGOSCOPY, GASTROSCOPY, DUODENOSCOPY (EGD), COMBINED N/A 7/25/2018    Procedure: COMBINED ESOPHAGOSCOPY, GASTROSCOPY, DUODENOSCOPY (EGD), REMOVE FOREIGN BODY;;  Surgeon: Candy Castelan MD;  Location:  GI     ESOPHAGOSCOPY, GASTROSCOPY, DUODENOSCOPY (EGD), COMBINED N/A 7/28/2018    Procedure: COMBINED ESOPHAGOSCOPY, GASTROSCOPY, DUODENOSCOPY (EGD), REMOVE FOREIGN BODY;  COMBINED ESOPHAGOSCOPY, GASTROSCOPY, DUODENOSCOPY (EGD), REMOVE FOREIGN BODY;  Surgeon: Brice Guzmán MD;  [facility]     ESOPHAGOSCOPY, GASTROSCOPY, DUODENOSCOPY (EGD), COMBINED N/A 7/31/2018    Procedure: COMBINED ESOPHAGOSCOPY, GASTROSCOPY, DUODENOSCOPY (EGD);  COMBINED ESOPHAGOSCOPY, GASTROSCOPY, DUODENOSCOPY (EGD) TO REMOVE FOREIGN BODY;  Surgeon: Lokesh Paula MD;  [facility]     ESOPHAGOSCOPY, GASTROSCOPY, DUODENOSCOPY (EGD), COMBINED N/A 8/4/2018    Procedure: COMBINED ESOPHAGOSCOPY, GASTROSCOPY, DUODENOSCOPY (EGD), REMOVE FOREIGN BODY;   combined esophagoscopy, gastroscopy, duodenoscopy, REMOVE FOREIGN BODY ;  Surgeon: Lokesh Paula MD;  [facility]     ESOPHAGOSCOPY, GASTROSCOPY, DUODENOSCOPY (EGD), COMBINED N/A 10/6/2019     Procedure: ESOPHAGOGASTRODUODENOSCOPY (EGD) with fireign body removal x2, esophageal stent placement with floroscopy;  Surgeon: Timoteo Espana MD;  [facility]     ESOPHAGOSCOPY, GASTROSCOPY, DUODENOSCOPY (EGD), COMBINED N/A 12/2/2019    Procedure: Esophagogastroduodenoscopy with esophageal stent removal, esophogram;  Surgeon: Kailee Tena MD;  [facility]     ESOPHAGOSCOPY, GASTROSCOPY, DUODENOSCOPY (EGD), COMBINED N/A 12/17/2019    Procedure: ESOPHAGOGASTRODUODENOSCOPY, WITH FOREIGN BODY REMOVAL;  Surgeon: Pamela Perez MD;  [facility]     ESOPHAGOSCOPY, GASTROSCOPY, DUODENOSCOPY (EGD), COMBINED N/A 12/13/2019    Procedure: ESOPHAGOGASTRODUODENOSCOPY, WITH FOREIGN BODY REMOVAL;  Surgeon: Samia Stanton MD;  [facility]     ESOPHAGOSCOPY, GASTROSCOPY, DUODENOSCOPY (EGD), COMBINED N/A 12/28/2019    Procedure: ESOPHAGOGASTRODUODENOSCOPY (EGD) Removal of Foreign Body X 2;  Surgeon: Huy Kelley MD;  [facility]     ESOPHAGOSCOPY, GASTROSCOPY, DUODENOSCOPY (EGD), COMBINED N/A 1/5/2020    Procedure: ESOPHAGOGASTRODUOENOSCOPY WITH FOREIGN BODY REMOVAL;  Surgeon: Pamela Perez MD;  [facility]     ESOPHAGOSCOPY, GASTROSCOPY, DUODENOSCOPY (EGD), COMBINED N/A 1/3/2020    Procedure: ESOPHAGOGASTRODUODENOSCOPY (EGD) REMOVAL OF FOREIGN BODY.;  Surgeon: Pamela Perez MD;  [facility]     ESOPHAGOSCOPY, GASTROSCOPY, DUODENOSCOPY (EGD), COMBINED N/A 1/13/2020    Procedure: ESOPHAGOGASTRODUODENOSCOPY (EGD) for foreign body removal;  Surgeon: Lokesh Paula MD;  [facility]     EXAM UNDER ANESTHESIA ANUS N/A 1/10/2017    Procedure: EXAM UNDER ANESTHESIA ANUS;  Surgeon: Annmarie Haynes MD;  [facility]     EXAM UNDER ANESTHESIA RECTUM N/A 7/19/2018    Procedure: EXAM UNDER ANESTHESIA RECTUM;  EXAM UNDER ANESTHESIA, REMOVAL OF RECTAL FOREIGN BODY;  Surgeon: Annmarie Haynes MD;  [facility]       REMOVE FECAL IMPACTION OR FB W ANESTHESIA N/A 12/18/2016    Procedure: REMOVE FECAL IMPACTION/FOREIGN BODY UNDER ANESTHESIA;  Surgeon: Soham Cano MD;  Location: [facility] OR     KNEE SURGERY - removed a small tissue mass from knee Right      LAPAROSCOPIC ABLATION ENDOMETRIOSIS       LAPAROTOMY EXPLORATORY N/A 1/10/2017    Procedure: LAPAROTOMY EXPLORATORY;  Surgeon: Annmarie Haynes MD;  [facility]     LAPAROTOMY EXPLORATORY  09/11/2019    Manual manipulation of bowel to remove pill bottle in rectum     lymph nodes removed from neck; benign  age 7     MAMMOPLASTY REDUCTION Bilateral      RELEASE CARPAL TUNNEL Bilateral      SIGMOIDOSCOPY FLEXIBLE N/A 1/10/2017    Procedure: SIGMOIDOSCOPY FLEXIBLE;  Surgeon: Annmarie Haynes MD;  [facility]     SIGMOIDOSCOPY FLEXIBLE N/A 5/8/2018    Procedure: SIGMOIDOSCOPY FLEXIBLE;  flex sig with foreign body removal using snare and rattooth forcep;  Surgeon: Soham Cano MD;  Location: [facility]     SIGMOIDOSCOPY FLEXIBLE N/A 2/20/2019    Procedure: Exam under anesthesia Colonoscopy with attempted  removal of rectal foreign body;  Surgeon: Estrada Chávez MD;  [facility]     No current facility-administered medications for this encounter.      Current Outpatient Medications   Medication     acetaminophen (TYLENOL) 32 mg/mL liquid     albuterol (PROAIR HFA) 108 (90 Base) MCG/ACT inhaler     alum & mag hydroxide-simethicone (MYLANTA/MAALOX) 200-200-20 MG/5ML SUSP suspension     busPIRone (BUSPAR) 10 MG tablet     calcium carbonate (TUMS) 500 MG chewable tablet     cloNIDine (CATAPRES) 0.1 MG tablet     desvenlafaxine (PRISTIQ) 100 MG 24 hr tablet     diphenhydrAMINE (BENADRYL) 25 MG capsule     docosanol (ABREVA) 10 % CREA cream     gabapentin (NEURONTIN) 600 MG tablet     levonorgestrel (MIRENA) 20 MCG/24HR IUD     lidocaine (XYLOCAINE) 2 % solution     lurasidone (LATUDA) 60 MG TABS tablet     metFORMIN (GLUCOPHAGE-XR) 500 MG 24 hr tablet      ondansetron (ZOFRAN-ODT) 8 MG ODT tab     oxyCODONE (ROXICODONE) 5 MG tablet     pantoprazole (PROTONIX) 40 MG EC tablet     simethicone (MYLICON) 80 MG chewable tablet     sucralfate (CARAFATE) 1 GM/10ML suspension     topiramate (TOPAMAX) 100 MG tablet     vitamin D3 (CHOLECALCIFEROL) 2000 units (50 mcg) tablet        Allergies   Allergen Reactions     Amoxicillin-Pot Clavulanate Other (See Comments), Rash and Swelling     PN: facial swelling, left side. Also had cortisone injection the same day as she started the Augmentin.  PN: facial swelling, left side. Also had cortisone injection the same day as she started the Augmentin.  Noted in downtime recovery of chart.       Penicillins Anaphylaxis     Vancomycin Swelling, Itching and Rash     Other reaction(s): Redness  Flushed face, very itchy; HUT Comment: Flushed face, very itchy; HUT Reaction: Angioedema; HUT Reaction: Redness; HUT Severity: Med; HUT Noted: [id number]  Flushed face, very itchy  Flushed face, very itchy  Flushed face, very itchy       Hydrocodone Nausea and Vomiting and GI Disturbance     vomiting for days  vomiting for days  vomiting for days  vomiting for days, PN: vomiting for days  vomiting for days  vomiting for days  vomiting for days  vomiting for days  vomiting for days  vomiting for days, PN: vomiting for days  vomiting for days  vomiting for days  vomiting for days  vomiting for days  ; HUT Comment: vomiting for days; HUT Reaction: Gastrointestinal; HUT Reaction: Nausea And Vomiting; HUT Reaction Type: Intolerance; HUT Severity: Med; HUT Noted: [id number]  vomiting for days       Blood-Group Specific Substance Other (See Comments)     Patient has an anti-Cw and non-specific antibodies. Blood product orders may be delayed. Draw one red top and two purple top tubes for all type/screen/crossmatch orders.     Influenza Vaccines Other (See Comments)     Oseltamivir Hives     med stopped, PN: med stopped     Cephalosporins Rash     Lamotrigine  Rash     Possibly associated with Lamictal.   HUT Comment: Possibly associated with Lamictal. ; HUT Reaction: Rash; HUT Reaction Type: Allergy; HUT Severity: Low; HUT Noted: [id number]     Latex Rash     Social History     Socioeconomic History     Marital status: Single     Spouse name: Not on file     Number of children: Not on file     Years of education: Not on file     Highest education level: Not on file   Occupational History     Occupation: On disability   Social Needs     Financial resource strain: Not on file     Food insecurity:     Worry: Not on file     Inability: Not on file     Transportation needs:     Medical: Not on file     Non-medical: Not on file   Tobacco Use     Smoking status: Never Smoker     Smokeless tobacco: Never Used   Substance and Sexual Activity     Alcohol use: No     Alcohol/week: 0.0 standard drinks     Drug use: No     Sexual activity: Yes     Partners: Male     Birth control/protection: I.U.D.     Comment: IUD - Mirena - placed July, 2015   Lifestyle     Physical activity:     Days per week: Not on file     Minutes per session: Not on file     Stress: Not on file   Relationships     Social connections:     Talks on phone: Not on file     Gets together: Not on file     Attends Mosque service: Not on file     Active member of club or organization: Not on file     Attends meetings of clubs or organizations: Not on file     Relationship status: Not on file     Intimate partner violence:     Fear of current or ex partner: Not on file     Emotionally abused: Not on file     Physically abused: Not on file     Forced sexual activity: Not on file   Other Topics Concern     Parent/sibling w/ CABG, MI or angioplasty before 65F 55M? Not Asked   Social History Narrative    Single.    Living in independent living portion of [facility].    On disability.    No regular exercise.        Review of Systems  General: No fevers or chills  Skin: No rash or diaphoresis  Eyes: No eye redness  "or discharge  Ears/Nose/Throat: No rhinorrhea or nasal congestion  Respiratory: No cough or SOB  Cardiovascular: No chest pain or palpitations  Gastrointestinal: No nausea, vomiting, or diarrhea, positive for abdominal pain and swallowed foreign body  Genitourinary: No urinary frequency, hematuria, or dysuria  Musculoskeletal: No arthralgias or myalgias  Neurologic: No numbness or weakness  Psychiatric: No depression, positive for anxiety, no suicidal ideation  Hematologic/Lymphatic/Immunologic: No leg swelling, patient is on blood thinning medications  Endocrine: No polyuria/polydypsia      Physical Exam   BP: 138/79  Pulse: 97  Temp: 99.2  F (37.3  C)  Resp: 18  Height: 157.5 cm (5' 2\")  Weight: 109.8 kg (242 lb)  SpO2: 100 %      Physical Exam  General: Well nourished, well developed, NAD  HEENT: EOMI, anicteric. NCAT, MMM  Neck: no jugular venous distension, supple, nl ROM  Cardiac: Regular rate and rhythm. No murmurs, rubs, or gallops. Normal S1, S2.  Intact peripheral pulses  Pulm: CTAB, no stridor, wheezes, rales, rhonchi  Abd: Soft, tenderness to palpation in the left upper quadrant without rebound or guarding, nondistended.  No masses palpated.    Skin: Warm and dry to the touch.  No rash  Extremities: No LE edema, no cyanosis, w/w/p  Neuro: A&Ox3, no gross focal deficits        ED Course        Procedures             Critical Care time:  none             Labs Ordered and Resulted from Time of ED Arrival Up to the Time of Departure from the ED   INR - Abnormal; Notable for the following components:       Result Value    INR 1.16 (*)     All other components within normal limits   CBC WITH PLATELETS DIFFERENTIAL   COMPREHENSIVE METABOLIC PANEL   PARTIAL THROMBOPLASTIN TIME   NO VTE PROPHYLAXIS   BEDSIDE ATTENDANT   IP ASSIGN PROVIDER TEAM TO TREATMENT TEAM   VITAL SIGNS   PERIPHERAL IV CATHETER   BEDSIDE ATTENDANT   PATIENT CARE ORDER   ACTIVITY          Results for orders placed or performed during the " hospital encounter of 01/17/20 (from the past 24 hour(s))   XR Abdomen 2 Views    Narrative    Exam:  XR ABDOMEN 2 VW, 1/17/2020 6:02 PM    History: Eval for metallic FB    Comparison:  1/12/2020    Findings:  Upright and supine radiographs of the abdomen. Linear  metallic density foreign body projects over the upper abdomen at  midline, likely within the stomach. No abnormally dilated bowel. No  pneumatosis, portal venous gas, or pneumoperitoneum. Intrauterine  device projects over the pelvis. Visualized lung apices are clear.  Left convex thoracic spinal curvature.      Impression    Impression:    Linear metallic density foreign body projects over the upper abdomen,  likely within the stomach.    I have personally reviewed the examination and initial interpretation  and I agree with the findings.    FLETCHER LEGER MD   CBC with platelets differential   Result Value Ref Range    WBC 10.1 4.0 - 11.0 10e9/L    RBC Count 4.15 3.8 - 5.2 10e12/L    Hemoglobin 11.9 11.7 - 15.7 g/dL    Hematocrit 37.2 35.0 - 47.0 %    MCV 90 78 - 100 fl    MCH 28.7 26.5 - 33.0 pg    MCHC 32.0 31.5 - 36.5 g/dL    RDW 14.0 10.0 - 15.0 %    Platelet Count 328 150 - 450 10e9/L    Diff Method Automated Method     % Neutrophils 68.2 %    % Lymphocytes 23.2 %    % Monocytes 6.7 %    % Eosinophils 1.3 %    % Basophils 0.3 %    % Immature Granulocytes 0.3 %    Nucleated RBCs 0 0 /100    Absolute Neutrophil 6.9 1.6 - 8.3 10e9/L    Absolute Lymphocytes 2.4 0.8 - 5.3 10e9/L    Absolute Monocytes 0.7 0.0 - 1.3 10e9/L    Absolute Eosinophils 0.1 0.0 - 0.7 10e9/L    Absolute Basophils 0.0 0.0 - 0.2 10e9/L    Abs Immature Granulocytes 0.0 0 - 0.4 10e9/L    Absolute Nucleated RBC 0.0    Comprehensive metabolic panel   Result Value Ref Range    Sodium 139 133 - 144 mmol/L    Potassium 3.5 3.4 - 5.3 mmol/L    Chloride 109 94 - 109 mmol/L    Carbon Dioxide 23 20 - 32 mmol/L    Anion Gap 8 3 - 14 mmol/L    Glucose 92 70 - 99 mg/dL    Urea Nitrogen 9 7 -  30 mg/dL    Creatinine 0.56 0.52 - 1.04 mg/dL    GFR Estimate >90 >60 mL/min/[1.73_m2]    GFR Estimate If Black >90 >60 mL/min/[1.73_m2]    Calcium 8.9 8.5 - 10.1 mg/dL    Bilirubin Total 0.2 0.2 - 1.3 mg/dL    Albumin 3.4 3.4 - 5.0 g/dL    Protein Total 7.5 6.8 - 8.8 g/dL    Alkaline Phosphatase 79 40 - 150 U/L    ALT 26 0 - 50 U/L    AST 16 0 - 45 U/L   INR   Result Value Ref Range    INR 1.16 (H) 0.86 - 1.14   Partial thromboplastin time   Result Value Ref Range    PTT 34 22 - 37 sec       Labs, vital signs, and imaging studies were reviewed by me.    Assessments & Plan (with Medical Decision Making)   Patient is a 29-year-old female with history of recurrent deliberately swallowed foreign bodies who presents to the emergency department after swallowing a pen spring.  X-ray ordered to better visualize the object.    I have reviewed the nursing notes.    I have reviewed the findings, diagnosis, plan and need for follow up with the patient.  1850 Pt was d/w DEC  who will see the patient and determine is 73 hour hold is appropriate    1934 PT d/w GI who will d/w attneding and call back.  They request patient be kept n.p.o.    1945 DEC  talking to patient    Will d/w [facility] physician    2000 Received call from GI. OR is unsure when will be available, may be several hours. They do not plan to consult thoracic surgery at this time. They recommend additional psychiatric evaluation.     2003 Pt d/w DEC , Darek, pt denies SI or HI to him. Pt admitted to swallowing the pen d/t her anxiety. PT is working with a  who canceled the appointment today which caused her anxiety. PT also admitted to getting anxious for no reason. No depression or psychosis. No hallucinations. He talked to patient's guardian also about trying to break this pattern, they would want inpatient treatment. DEC  agrees ewith inpatient placement as this may be a natural deterrent for patient's behaviors.      Discussed possibility of inpatient hospitalization with the patient, she is concerned about this because she doesn't like her medications to be changed by anyone but her psychiatrist. She states that she feels more anxious when hospitalized because they wouldn't let her have objets she might swallow (remote control, markers, pens, utensils to eat with). She does not want to be hospitalized. The pt is also anxious about missing some appointments next week. Patient states she feels safe going home because she has removed objects that she might swallow from her apartment. Pt does have an apartment where she is the only one with a abdalla.    Will place patient on a 72 hour hold. Patient will need psychiatric evaluation while inpatient for medicine and likely transfer to inpatient psych when cleared medically after procedure. Pt may benefit from naltrexone therapy.     2013 Pt was d/w GI, pt's psychiatric care is at [facility]. Pt may need to have GI care restricted to same facility as her psychiatric care. OR may not be available until tomorrow. Pt is currently stable. Will d/w social work to see if the can be sent in motion. Will d/w patient psych intake about possibility of placement at [facility] for inpatient psych after medically cleared.     2023 Pt was d/w Dr. Shearer at the [facility]. Patient will need psychiatry consult during this admission to determine inpatient psychiatric placement after medical admission. 72 hour hold is likely reasonable at this time.     2027 Spoke with DEC , Darek. OK for pt to be admitted medically. Pt will need psychiatry consult during admission to determine if further inpatient placement is needed. PT cannot be placed on wait list for psychiatric placement until she is medically cleared.     2031 Notified that OR is on divert    2033 PT d/w social work who will investigate possibility of [facility] so that psychiatric and other care can be  coordinated. She will contact patient's insurance company, likely on monday.     Patient discussed with internal medicine, to be admitted to their service for further management. Plan was discussed with patient who understands and agrees with plan.     New Prescriptions    No medications on file       Final diagnoses:   Swallowed foreign body, initial encounter       1/17/2020   [facility], [place], EMERGENCY DEPARTMENT     Lola Joy MD  01/18/20 0043

## 2020-01-17 NOTE — PROGRESS NOTES
Emergency Social Work Services Note    Date of  Intervention: 01/17/20  Last Emergency Department Visit:  1/12/19 ED to Hosp-Admission for Swallowed foreign body.  Care Plan: Extensive care plan related to history of self-injurious behavior including ingestion of foreign bodies and significant mental health hx. please see problem list for full ED treatment plan.  Collaborated with:  ED MD, Chart review, Hollie AGUIRRE  ([phone]), Marian[facility] ([phone])    Data:  Nevin is a 29 year-old female with a history of depression, PTSD and borderline personality disorder who presents to the Emergency Department after swallowing a pen spring.  Nevin states she swallowed this pen spring in response to increased feelings of anxiety.  She shares that she started taking Atarax for her anxiety in the past week and no noted improvement of anxiety symptoms.  Of note, Nevin has over 60 ED visits in the past 6 months for self-injurious behavior (anal and vaginal insertions and swallowing).     ED SW consulted per chart review to assist with identifying and updating Nevin's extensive community supports.      Intervention:    ED SW contacted Nevin Browne's TIMOTHY  and LVM with callback soon after.  Hollie confirms that Nevin is currently living in [facility], a supported apartment that inculdes independent living with mental health case management onsite.  Per Hollie, her support team has collaborated with [facility] recently who report increased concerns regarding the continued self-injuirous behaviors which puts Nevin at greater risk since starting blood thinners.  On 1/16/19, [facility] petitioned and appointed a 90 day guardianship with [employer] for Nevin.  Per IJEOMA Browne, hope is that Guardian will work closely with Nevin and determine the most appropriate level of support to  meet her complex needs.  Nevin was provided the option to contest guardianship and declined, so she is agreeable.  Hollie states that Nevin met her guardian in-person on 1/16.  Appointed guardian is Marianna Wang at [facility] ([phone]).    ED SW contacted Guardian Marianna who reports that Nevin did call her this afternoon and Marianna encouraged her to consult with the mental health staff in her building if she was feeling triggered to consume.  Marianna also reiterated that Nevin is at-risk of losing her housing with these continued behaviors.  Marianna states that Nevin voiced clear understanding regarding this plan so Marianna was surprised to hear that she presented to the ED.   Marianna states that she is still developing a plan for how to address this behavior, given that she only just met Nevin yesterday.  She discusses potential for moving Nevin to a 24 hour monitored group home has been discussed, and Nevin is aware that she may end up there if she continues to consume.  ED SW and Guardian discussed giving Nevin a more precise number of ED visits that, if met, would lead to a move into higher level housing environment.  Per Guardijuan, past discussions have posed this as a possibility to Nevin but it seems that Nevin does not view it as a true risk with continued behavior and therefore not grasping the need to stop. After further discussion, isrrael decides to recommend a 72 hour hold for more involved psychiatric assessment. ED SW relayed to ED MD who will request DEC assessment for consult and recommendations.    Current community supports as follows:    Marianna Wang   Guardian at [facility]  ([phone])    Hollie AGUIRRE CM with [facility]  ([phone])     Roya Marrufo  Mental Health CM with [facility]  ([phone])    24-hour crisis assistance through [employer] (paid for by TIMOTHY nichols)  ([phone] ext. [phone])    Armaan Truong  ([phone])    Psychiatrist at  [facility] Baron MD     ED SW contacted [facility] to update regarding Nevin's new Guardianship status.      ED SW updated Guardian Marianna regarding plan for admission to medicine for further psych assessment and GI consult re: spring removal.      Assessment:  extensive mental health issues with self-injurious behavior; Guardianship; Nevin is agreeable to recommendaitons for care and working cooperatively with her community supports.    Plan:    Anticipated Disposition:  Home with services    Barriers to d/c plan:      Follow Up:  ED SW available to support, as needed.     YORDAN Oliveros, MSW  Social Work Services, Emergency Dept [facility]  Pager: [phone] Mon-Sat 9 am - 9 pm, on-call/after hours pager [phone]

## 2020-01-18 ENCOUNTER — APPOINTMENT (OUTPATIENT)
Dept: GENERAL RADIOLOGY | Facility: CLINIC | Age: 30
DRG: 394 | End: 2020-01-18
Attending: NURSE PRACTITIONER
Payer: COMMERCIAL

## 2020-01-18 VITALS
TEMPERATURE: 98.6 F | HEIGHT: 62 IN | BODY MASS INDEX: 44.53 KG/M2 | OXYGEN SATURATION: 97 % | SYSTOLIC BLOOD PRESSURE: 142 MMHG | HEART RATE: 90 BPM | WEIGHT: 242 LBS | RESPIRATION RATE: 12 BRPM | DIASTOLIC BLOOD PRESSURE: 86 MMHG

## 2020-01-18 LAB
GLUCOSE BLDC GLUCOMTR-MCNC: 95 MG/DL (ref 70–99)
HCG UR QL: NEGATIVE
UPPER GI ENDOSCOPY: NORMAL
UPPER GI ENDOSCOPY: NORMAL

## 2020-01-18 PROCEDURE — 81025 URINE PREGNANCY TEST: CPT | Performed by: ANESTHESIOLOGY

## 2020-01-18 PROCEDURE — 25000125 ZZHC RX 250: Performed by: STUDENT IN AN ORGANIZED HEALTH CARE EDUCATION/TRAINING PROGRAM

## 2020-01-18 PROCEDURE — 37000008 ZZH ANESTHESIA TECHNICAL FEE, 1ST 30 MIN: Performed by: INTERNAL MEDICINE

## 2020-01-18 PROCEDURE — 40000171 ZZH STATISTIC PRE-PROCEDURE ASSESSMENT III: Performed by: INTERNAL MEDICINE

## 2020-01-18 PROCEDURE — 37000009 ZZH ANESTHESIA TECHNICAL FEE, EACH ADDTL 15 MIN: Performed by: INTERNAL MEDICINE

## 2020-01-18 PROCEDURE — 27210794 ZZH OR GENERAL SUPPLY STERILE: Performed by: INTERNAL MEDICINE

## 2020-01-18 PROCEDURE — 71000016 ZZH RECOVERY PHASE 1 LEVEL 3 FIRST HR: Performed by: INTERNAL MEDICINE

## 2020-01-18 PROCEDURE — 25000128 H RX IP 250 OP 636: Performed by: HOSPITALIST

## 2020-01-18 PROCEDURE — 36000053 ZZH SURGERY LEVEL 2 EA 15 ADDTL MIN - UMMC: Performed by: INTERNAL MEDICINE

## 2020-01-18 PROCEDURE — 25000132 ZZH RX MED GY IP 250 OP 250 PS 637: Performed by: HOSPITALIST

## 2020-01-18 PROCEDURE — 25000132 ZZH RX MED GY IP 250 OP 250 PS 637: Performed by: STUDENT IN AN ORGANIZED HEALTH CARE EDUCATION/TRAINING PROGRAM

## 2020-01-18 PROCEDURE — 25000128 H RX IP 250 OP 636: Performed by: STUDENT IN AN ORGANIZED HEALTH CARE EDUCATION/TRAINING PROGRAM

## 2020-01-18 PROCEDURE — 25000125 ZZHC RX 250: Performed by: INTERNAL MEDICINE

## 2020-01-18 PROCEDURE — 25800030 ZZH RX IP 258 OP 636: Performed by: STUDENT IN AN ORGANIZED HEALTH CARE EDUCATION/TRAINING PROGRAM

## 2020-01-18 PROCEDURE — 25000132 ZZH RX MED GY IP 250 OP 250 PS 637: Performed by: NURSE PRACTITIONER

## 2020-01-18 PROCEDURE — 25000566 ZZH SEVOFLURANE, EA 15 MIN: Performed by: INTERNAL MEDICINE

## 2020-01-18 PROCEDURE — 36000051 ZZH SURGERY LEVEL 2 1ST 30 MIN - UMMC: Performed by: INTERNAL MEDICINE

## 2020-01-18 PROCEDURE — 0DJ08ZZ INSPECTION OF UPPER INTESTINAL TRACT, VIA NATURAL OR ARTIFICIAL OPENING ENDOSCOPIC: ICD-10-PCS | Performed by: INTERNAL MEDICINE

## 2020-01-18 PROCEDURE — 74018 RADEX ABDOMEN 1 VIEW: CPT

## 2020-01-18 PROCEDURE — 00000146 ZZHCL STATISTIC GLUCOSE BY METER IP

## 2020-01-18 RX ORDER — SODIUM CHLORIDE, SODIUM LACTATE, POTASSIUM CHLORIDE, CALCIUM CHLORIDE 600; 310; 30; 20 MG/100ML; MG/100ML; MG/100ML; MG/100ML
INJECTION, SOLUTION INTRAVENOUS CONTINUOUS
Status: DISCONTINUED | OUTPATIENT
Start: 2020-01-18 | End: 2020-01-18 | Stop reason: HOSPADM

## 2020-01-18 RX ORDER — PROPOFOL 10 MG/ML
INJECTION, EMULSION INTRAVENOUS PRN
Status: DISCONTINUED | OUTPATIENT
Start: 2020-01-18 | End: 2020-01-18

## 2020-01-18 RX ORDER — ONDANSETRON 4 MG/1
4 TABLET, ORALLY DISINTEGRATING ORAL EVERY 30 MIN PRN
Status: DISCONTINUED | OUTPATIENT
Start: 2020-01-18 | End: 2020-01-18 | Stop reason: HOSPADM

## 2020-01-18 RX ORDER — FLUMAZENIL 0.1 MG/ML
0.2 INJECTION, SOLUTION INTRAVENOUS
Status: DISCONTINUED | OUTPATIENT
Start: 2020-01-18 | End: 2020-01-18 | Stop reason: HOSPADM

## 2020-01-18 RX ORDER — SODIUM CHLORIDE, SODIUM LACTATE, POTASSIUM CHLORIDE, CALCIUM CHLORIDE 600; 310; 30; 20 MG/100ML; MG/100ML; MG/100ML; MG/100ML
INJECTION, SOLUTION INTRAVENOUS CONTINUOUS PRN
Status: DISCONTINUED | OUTPATIENT
Start: 2020-01-18 | End: 2020-01-18

## 2020-01-18 RX ORDER — LIDOCAINE HYDROCHLORIDE 20 MG/ML
INJECTION, SOLUTION INFILTRATION; PERINEURAL PRN
Status: DISCONTINUED | OUTPATIENT
Start: 2020-01-18 | End: 2020-01-18

## 2020-01-18 RX ORDER — NALOXONE HYDROCHLORIDE 0.4 MG/ML
.1-.4 INJECTION, SOLUTION INTRAMUSCULAR; INTRAVENOUS; SUBCUTANEOUS
Status: DISCONTINUED | OUTPATIENT
Start: 2020-01-18 | End: 2020-01-18 | Stop reason: HOSPADM

## 2020-01-18 RX ORDER — ACETAMINOPHEN 325 MG/1
975 TABLET ORAL ONCE
Status: DISCONTINUED | OUTPATIENT
Start: 2020-01-18 | End: 2020-01-18 | Stop reason: HOSPADM

## 2020-01-18 RX ORDER — LURASIDONE HYDROCHLORIDE 20 MG/1
60 TABLET, FILM COATED ORAL DAILY
Status: DISCONTINUED | OUTPATIENT
Start: 2020-01-18 | End: 2020-01-18 | Stop reason: HOSPADM

## 2020-01-18 RX ORDER — HEPARIN SODIUM (PORCINE) LOCK FLUSH IV SOLN 100 UNIT/ML 100 UNIT/ML
5 SOLUTION INTRAVENOUS ONCE
Status: COMPLETED | OUTPATIENT
Start: 2020-01-18 | End: 2020-01-18

## 2020-01-18 RX ORDER — HYDROXYZINE HYDROCHLORIDE 50 MG/1
50 TABLET, FILM COATED ORAL 3 TIMES DAILY PRN
COMMUNITY
End: 2020-01-18

## 2020-01-18 RX ORDER — TRAMADOL HYDROCHLORIDE 50 MG/1
50 TABLET ORAL EVERY 6 HOURS PRN
Status: DISCONTINUED | OUTPATIENT
Start: 2020-01-18 | End: 2020-01-18 | Stop reason: HOSPADM

## 2020-01-18 RX ORDER — ONDANSETRON 2 MG/ML
4 INJECTION INTRAMUSCULAR; INTRAVENOUS EVERY 30 MIN PRN
Status: DISCONTINUED | OUTPATIENT
Start: 2020-01-18 | End: 2020-01-18 | Stop reason: HOSPADM

## 2020-01-18 RX ORDER — IBUPROFEN 200 MG
600 TABLET ORAL ONCE
Status: DISCONTINUED | OUTPATIENT
Start: 2020-01-18 | End: 2020-01-18

## 2020-01-18 RX ORDER — LIDOCAINE 40 MG/G
CREAM TOPICAL
Status: DISCONTINUED | OUTPATIENT
Start: 2020-01-18 | End: 2020-01-18 | Stop reason: HOSPADM

## 2020-01-18 RX ORDER — HYDROXYZINE HYDROCHLORIDE 50 MG/1
50 TABLET, FILM COATED ORAL 3 TIMES DAILY PRN
Qty: 30 TABLET | Refills: 0 | Status: ON HOLD | OUTPATIENT
Start: 2020-01-18 | End: 2020-09-20

## 2020-01-18 RX ORDER — MEPERIDINE HYDROCHLORIDE 25 MG/ML
12.5 INJECTION INTRAMUSCULAR; INTRAVENOUS; SUBCUTANEOUS
Status: DISCONTINUED | OUTPATIENT
Start: 2020-01-18 | End: 2020-01-18 | Stop reason: HOSPADM

## 2020-01-18 RX ORDER — ONDANSETRON 4 MG/1
4 TABLET, ORALLY DISINTEGRATING ORAL EVERY 6 HOURS PRN
Status: ON HOLD | COMMUNITY
End: 2020-09-20

## 2020-01-18 RX ORDER — LORAZEPAM 0.5 MG/1
0.5 TABLET ORAL EVERY 4 HOURS PRN
Status: DISCONTINUED | OUTPATIENT
Start: 2020-01-18 | End: 2020-01-18 | Stop reason: HOSPADM

## 2020-01-18 RX ADMIN — HYDROXYZINE HYDROCHLORIDE 50 MG: 50 TABLET, FILM COATED ORAL at 01:32

## 2020-01-18 RX ADMIN — DESVENLAFAXINE 100 MG: 50 TABLET, FILM COATED, EXTENDED RELEASE ORAL at 11:08

## 2020-01-18 RX ADMIN — HEPARIN 5 ML: 100 SYRINGE at 18:38

## 2020-01-18 RX ADMIN — LIDOCAINE HYDROCHLORIDE 100 MG: 20 INJECTION, SOLUTION INFILTRATION; PERINEURAL at 08:31

## 2020-01-18 RX ADMIN — DIPHENHYDRAMINE HYDROCHLORIDE 25 MG: 25 CAPSULE ORAL at 06:28

## 2020-01-18 RX ADMIN — GABAPENTIN 600 MG: 600 TABLET, FILM COATED ORAL at 14:44

## 2020-01-18 RX ADMIN — LORAZEPAM 0.5 MG: 0.5 TABLET ORAL at 13:15

## 2020-01-18 RX ADMIN — BUSPIRONE HYDROCHLORIDE 10 MG: 10 TABLET ORAL at 11:07

## 2020-01-18 RX ADMIN — ACETAMINOPHEN 1000 MG: 325 SOLUTION ORAL at 01:31

## 2020-01-18 RX ADMIN — Medication 100 MG: at 08:31

## 2020-01-18 RX ADMIN — PROPOFOL 150 MG: 10 INJECTION, EMULSION INTRAVENOUS at 08:31

## 2020-01-18 RX ADMIN — TRAMADOL HYDROCHLORIDE 50 MG: 50 TABLET, FILM COATED ORAL at 13:15

## 2020-01-18 RX ADMIN — TOPIRAMATE 100 MG: 25 TABLET, FILM COATED ORAL at 00:32

## 2020-01-18 RX ADMIN — PHENYLEPHRINE HYDROCHLORIDE 100 MCG: 10 INJECTION INTRAVENOUS at 08:45

## 2020-01-18 RX ADMIN — CLONIDINE HYDROCHLORIDE 0.1 MG: 0.1 TABLET ORAL at 11:09

## 2020-01-18 RX ADMIN — ACETAMINOPHEN 975 MG: 325 SOLUTION ORAL at 09:17

## 2020-01-18 RX ADMIN — GABAPENTIN 600 MG: 600 TABLET, FILM COATED ORAL at 11:10

## 2020-01-18 RX ADMIN — SODIUM CHLORIDE, POTASSIUM CHLORIDE, SODIUM LACTATE AND CALCIUM CHLORIDE: 600; 310; 30; 20 INJECTION, SOLUTION INTRAVENOUS at 08:26

## 2020-01-18 RX ADMIN — OXYCODONE HYDROCHLORIDE 5 MG: 5 TABLET ORAL at 04:18

## 2020-01-18 RX ADMIN — APIXABAN 5 MG: 5 TABLET, FILM COATED ORAL at 11:06

## 2020-01-18 RX ADMIN — ONDANSETRON 4 MG: 2 INJECTION INTRAMUSCULAR; INTRAVENOUS at 08:55

## 2020-01-18 NOTE — CONSULTS
GASTROENTEROLOGY CONSULTATION      Date of Admission:  1/17/2020           Reason for Consultation:   We were asked by Dr. Frost of internal Medicine to evaluate this patient with a foreign body ingestion.            ASSESSMENT AND RECOMMENDATIONS:   Assessment:  29 year old female with a history of psychiatric disturbances including borderline personality posttraumatic stress disorder and recurrent foreign body ingestion.  She has ingested and straighten out then spring which is now her small bowel.    At this point it is most likely that the aspirate will pass on its own.  It was able to get through the esophagus and pylorus and duodenal sweep without causing any substantial damage or becoming obstructed.  The last anatomic area that it will need to clear the ileocecal valve.  At this point I recommend that she is on a regular diet as this may help coat the metallic object to make it easier to pass.  Do not recommend laxatives at this time.  She should have a abdominal x-ray in 72 hours to assess if the object is still in her small bowel or colon or has passed.  Should it have not passed in 72 hours she will need an x-ray again in 1 week.    Should she have fever, severe abdominal pain or leukocytosis, then recommend CT scan to assess for perforation in which case surgery would be needed.  In the absence of those however it is just conservative management.    -- Regular diet   -- Continue home medications  -- Abdominal X-ray in 72 hours  -- If fever, severe abdominal pain or leukocytosis, obtain CT of abdomen and pelvis.      Recommendations    Gastroenterology outpatient follow up recommendations: She does not need outpatient GI follow-up.     Thank you for involving us in this patient's care. Please do not hesitate to contact the GI service with any questions or concerns.     Lokesh Paula MD  GI  staff  -------------------------------------------------------------------------------------------------------------------           History of Present Illness:   Nevin Alvarado is a 29 year old female with a  substantial psychiatric history including anorexia/bulimia, borderline personality disorder, posttraumatic stress disorder who has had multiple foreign body ingestions orally, vaginally and rectally.  She was seen earlier this week for ingestion of a pen spring that was removed from her pylorus/duodenal bulb with an upper endoscopy.  She swallowed a pen spring again on 1/17/2020.  Unfortunately due to OR availability, we were unable to perform an upper endoscopy on 1/17/2020.  Abdominal x-ray on 1/18/2020 demonstrated metallic object probably in the small bowel.  We elected to proceed with endoscopy on the chance that she had the metallic object in her duodenum which was still reachable.  Upper endoscopy on 1/18/2020 without visible foreign body.    She has no specific symptoms at this time.  No abdominal pain, no heartburn, no nausea or vomiting.  No change in her bowel movements.  No other notable pertinent positives.            Past Medical History:   Reviewed and edited as appropriate  Past Medical History:   Diagnosis Date     ADD (attention deficit disorder)      Anorexia nervosa with bulimia     history of; on Topamax     Anxiety      Borderline personality disorder (H)      Depression      Depressive disorder      H/O self-harm      Lives in independent group home     due to debilitating mental illness     Migraine without aura     no known triggers; on Topamax bid and Imitrex PRN     Morbid obesity (H)      PTSD (post-traumatic stress disorder)      Rectal foreign body - Recurrent issue, self placed      Swallowed foreign body - Recurrent issue, self placed             Past Surgical History:   Reviewed and edited as appropriate   Past Surgical History:   Procedure Laterality Date     COMBINED  ESOPHAGOSCOPY, GASTROSCOPY, DUODENOSCOPY (EGD), REPLACE ESOPHAGEAL STENT N/A 10/9/2019    Procedure: Upper Endoscopy with Suture Placement;  Surgeon: Zurdo Ramirez MD;  Location: [facility]     ESOPHAGOSCOPY, GASTROSCOPY, DUODENOSCOPY (EGD), COMBINED N/A 3/9/2017    Procedure: COMBINED ESOPHAGOSCOPY, GASTROSCOPY, DUODENOSCOPY (EGD), REMOVE FOREIGN BODY;  Surgeon: Avis Guzmán MD;  Location: [facility]     ESOPHAGOSCOPY, GASTROSCOPY, DUODENOSCOPY (EGD), COMBINED N/A 4/20/2017    Procedure: COMBINED ESOPHAGOSCOPY, GASTROSCOPY, DUODENOSCOPY (EGD), REMOVE FOREIGN BODY;  EGD removal Foregin body;  Surgeon: Lokesh Paula MD;  Location: [facility]     ESOPHAGOSCOPY, GASTROSCOPY, DUODENOSCOPY (EGD), COMBINED N/A 6/12/2017    Procedure: COMBINED ESOPHAGOSCOPY, GASTROSCOPY, DUODENOSCOPY (EGD);  COMBINED ESOPHAGOSCOPY, GASTROSCOPY, DUODENOSCOPY (EGD) [[id number]]attempted removal of foreign body;  Surgeon: Pamela Perez MD;  Location: [facility]     ESOPHAGOSCOPY, GASTROSCOPY, DUODENOSCOPY (EGD), COMBINED N/A 6/9/2017    Procedure: COMBINED ESOPHAGOSCOPY, GASTROSCOPY, DUODENOSCOPY (EGD), REMOVE FOREIGN BODY;  Esophagoscopy, Gastroscopy, Duodenoscopy, Removal of Foreign Body;  Surgeon: Dejon Marsh MD;  Location: [facility]     ESOPHAGOSCOPY, GASTROSCOPY, DUODENOSCOPY (EGD), COMBINED N/A 1/6/2018    Procedure: COMBINED ESOPHAGOSCOPY, GASTROSCOPY, DUODENOSCOPY (EGD), REMOVE FOREIGN BODY;  COMBINED ESOPHAGOSCOPY, GASTROSCOPY, DUODENOSCOPY (EGD) [by pascal net and snare with profol sedation;  Surgeon: Feliciano Emmanuel MD;  Location:  GI     ESOPHAGOSCOPY, GASTROSCOPY, DUODENOSCOPY (EGD), COMBINED N/A 3/19/2018    Procedure: COMBINED ESOPHAGOSCOPY, GASTROSCOPY, DUODENOSCOPY (EGD), REMOVE FOREIGN BODY;   Esophagodscopy, Gastroscopy, Duodenoscopy,Foreign Body Removal;  Surgeon: Brice Guzmán MD;  Location: [facility]     ESOPHAGOSCOPY, GASTROSCOPY, DUODENOSCOPY (EGD), COMBINED N/A 4/16/2018     Procedure: COMBINED ESOPHAGOSCOPY, GASTROSCOPY, DUODENOSCOPY (EGD), REMOVE FOREIGN BODY;  Esophagogastroduodenoscopy  Foreign Body Removal X 2;  Surgeon: Royer Moise MD;  [facility]: [facility]     ESOPHAGOSCOPY, GASTROSCOPY, DUODENOSCOPY (EGD), COMBINED N/A 6/1/2018    Procedure: COMBINED ESOPHAGOSCOPY, GASTROSCOPY, DUODENOSCOPY (EGD), REMOVE FOREIGN BODY;  COMBINED ESOPHAGOSCOPY, GASTROSCOPY, DUODENOSCOPY with removal of foreign body, propofol sedation by anesthesia;  Surgeon: Brice Martinez MD;  Location:  GI     ESOPHAGOSCOPY, GASTROSCOPY, DUODENOSCOPY (EGD), COMBINED N/A 7/25/2018    Procedure: COMBINED ESOPHAGOSCOPY, GASTROSCOPY, DUODENOSCOPY (EGD), REMOVE FOREIGN BODY;;  Surgeon: Candy Castelan MD;  Location:  GI     ESOPHAGOSCOPY, GASTROSCOPY, DUODENOSCOPY (EGD), COMBINED N/A 7/28/2018    Procedure: COMBINED ESOPHAGOSCOPY, GASTROSCOPY, DUODENOSCOPY (EGD), REMOVE FOREIGN BODY;  COMBINED ESOPHAGOSCOPY, GASTROSCOPY, DUODENOSCOPY (EGD), REMOVE FOREIGN BODY;  Surgeon: Brice Guzmán MD;  [facility]     ESOPHAGOSCOPY, GASTROSCOPY, DUODENOSCOPY (EGD), COMBINED N/A 7/31/2018    Procedure: COMBINED ESOPHAGOSCOPY, GASTROSCOPY, DUODENOSCOPY (EGD);  COMBINED ESOPHAGOSCOPY, GASTROSCOPY, DUODENOSCOPY (EGD) TO REMOVE FOREIGN BODY;  Surgeon: Lokesh Paula MD;  [facility]     ESOPHAGOSCOPY, GASTROSCOPY, DUODENOSCOPY (EGD), COMBINED N/A 8/4/2018    Procedure: COMBINED ESOPHAGOSCOPY, GASTROSCOPY, DUODENOSCOPY (EGD), REMOVE FOREIGN BODY;   combined esophagoscopy, gastroscopy, duodenoscopy, REMOVE FOREIGN BODY ;  Surgeon: Lokesh Paula MD;  [facility]: [facility]     ESOPHAGOSCOPY, GASTROSCOPY, DUODENOSCOPY (EGD), COMBINED N/A 10/6/2019    Procedure: ESOPHAGOGASTRODUODENOSCOPY (EGD) with fireign body removal x2, esophageal stent placement with floroscopy;  Surgeon: Timoteo Espana MD;  [facility]: [facility]     ESOPHAGOSCOPY, GASTROSCOPY, DUODENOSCOPY (EGD), COMBINED N/A 12/2/2019     Procedure: Esophagogastroduodenoscopy with esophageal stent removal, esophogram;  Surgeon: Kailee Tena MD;  [facility]: [facility]     ESOPHAGOSCOPY, GASTROSCOPY, DUODENOSCOPY (EGD), COMBINED N/A 12/17/2019    Procedure: ESOPHAGOGASTRODUODENOSCOPY, WITH FOREIGN BODY REMOVAL;  Surgeon: Pamela Perez MD;  [facility]     ESOPHAGOSCOPY, GASTROSCOPY, DUODENOSCOPY (EGD), COMBINED N/A 12/13/2019    Procedure: ESOPHAGOGASTRODUODENOSCOPY, WITH FOREIGN BODY REMOVAL;  Surgeon: Samia Stanton MD;  [facility]: [facility]     ESOPHAGOSCOPY, GASTROSCOPY, DUODENOSCOPY (EGD), COMBINED N/A 12/28/2019    Procedure: ESOPHAGOGASTRODUODENOSCOPY (EGD) Removal of Foreign Body X 2;  Surgeon: Huy Kelley MD;  [facility]     ESOPHAGOSCOPY, GASTROSCOPY, DUODENOSCOPY (EGD), COMBINED N/A 1/5/2020    Procedure: ESOPHAGOGASTRODUOENOSCOPY WITH FOREIGN BODY REMOVAL;  Surgeon: Pamela Perez MD;  [facility]     ESOPHAGOSCOPY, GASTROSCOPY, DUODENOSCOPY (EGD), COMBINED N/A 1/3/2020    Procedure: ESOPHAGOGASTRODUODENOSCOPY (EGD) REMOVAL OF FOREIGN BODY.;  Surgeon: Pamela Perez MD;  [facility]     ESOPHAGOSCOPY, GASTROSCOPY, DUODENOSCOPY (EGD), COMBINED N/A 1/13/2020    Procedure: ESOPHAGOGASTRODUODENOSCOPY (EGD) for foreign body removal;  Surgeon: Lokesh Paula MD;  [facility]     EXAM UNDER ANESTHESIA ANUS N/A 1/10/2017    Procedure: EXAM UNDER ANESTHESIA ANUS;  Surgeon: Annmarie Haynes MD;  [facility]     EXAM UNDER ANESTHESIA RECTUM N/A 7/19/2018    Procedure: EXAM UNDER ANESTHESIA RECTUM;  EXAM UNDER ANESTHESIA, REMOVAL OF RECTAL FOREIGN BODY;  Surgeon: Annmarie Haynes MD;  [facility]     HC REMOVE FECAL IMPACTION OR FB W ANESTHESIA N/A 12/18/2016    Procedure: REMOVE FECAL IMPACTION/FOREIGN BODY UNDER ANESTHESIA;  Surgeon: Soham Cano MD;  Location: [facility]     KNEE SURGERY - removed a small tissue mass from knee Right       LAPAROSCOPIC ABLATION ENDOMETRIOSIS       LAPAROTOMY EXPLORATORY N/A 1/10/2017    Procedure: LAPAROTOMY EXPLORATORY;  Surgeon: Annmarie Haynes MD;  Location: [facility]     LAPAROTOMY EXPLORATORY  09/11/2019    Manual manipulation of bowel to remove pill bottle in rectum     lymph nodes removed from neck; benign  age 7     MAMMOPLASTY REDUCTION Bilateral      RELEASE CARPAL TUNNEL Bilateral      SIGMOIDOSCOPY FLEXIBLE N/A 1/10/2017    Procedure: SIGMOIDOSCOPY FLEXIBLE;  Surgeon: Annmarie Haynes MD;  [facility]: [facility]     SIGMOIDOSCOPY FLEXIBLE N/A 5/8/2018    Procedure: SIGMOIDOSCOPY FLEXIBLE;  flex sig with foreign body removal using snare and rattooth forcep;  Surgeon: Soham Cano MD;  Location:  GI     SIGMOIDOSCOPY FLEXIBLE N/A 2/20/2019    Procedure: Exam under anesthesia Colonoscopy with attempted  removal of rectal foreign body;  Surgeon: Estrada Chávez MD;  Location: [facility]            Previous Endoscopy:   No results found for this or any previous visit.         Social History:   Reviewed and edited as appropriate  Social History     Socioeconomic History     Marital status: Single     Spouse name: Not on file     Number of children: Not on file     Years of education: Not on file     Highest education level: Not on file   Occupational History     Occupation: On disability   Social Needs     Financial resource strain: Not on file     Food insecurity:     Worry: Not on file     Inability: Not on file     Transportation needs:     Medical: Not on file     Non-medical: Not on file   Tobacco Use     Smoking status: Never Smoker     Smokeless tobacco: Never Used   Substance and Sexual Activity     Alcohol use: No     Alcohol/week: 0.0 standard drinks     Drug use: No     Sexual activity: Yes     Partners: Male     Birth control/protection: I.U.D.     Comment: IUD - Mirena - placed July, 2015   Lifestyle     Physical activity:     Days per week: Not on file     Minutes per session: Not on  file     Stress: Not on file   Relationships     Social connections:     Talks on phone: Not on file     Gets together: Not on file     Attends [employer] service: Not on file     Active member of club or organization: Not on file     Attends meetings of clubs or organizations: Not on file     Relationship status: Not on file     Intimate partner violence:     Fear of current or ex partner: Not on file     Emotionally abused: Not on file     Physically abused: Not on file     Forced sexual activity: Not on file   Other Topics Concern     Parent/sibling w/ CABG, MI or angioplasty before 65F 55M? Not Asked   Social History Narrative    Single.    Living in independent living portion of [facility].    On disability.    No regular exercise.             Family History:   Reviewed and edited as appropriate  Family History   Problem Relation Age of Onset     Diabetes Type 2  Maternal Grandmother      Diabetes Type 2  Paternal Grandmother      Breast Cancer Paternal Grandmother      Cerebrovascular Disease Father 53     Myocardial Infarction No family hx of      Coronary Artery Disease Early Onset No family hx of      Ovarian Cancer No family hx of      Colon Cancer No family hx of      No known history of colorectal cancer, liver disease, or inflammatory bowel disease.         Allergies:   Reviewed and edited as appropriate     Allergies   Allergen Reactions     Amoxicillin-Pot Clavulanate Other (See Comments), Rash and Swelling     PN: facial swelling, left side. Also had cortisone injection the same day as she started the Augmentin.  PN: facial swelling, left side. Also had cortisone injection the same day as she started the Augmentin.  Noted in downtime recovery of chart.       Penicillins Anaphylaxis     Vancomycin Swelling, Itching and Rash     Other reaction(s): Redness  Flushed face, very itchy; HUT Comment: Flushed face, very itchy; HUT Reaction: Angioedema; HUT Reaction: Redness; HUT Severity: Med; HUT  Noted: 2019  Flushed face, very itchy  Flushed face, very itchy  Flushed face, very itchy       Hydrocodone Nausea and Vomiting and GI Disturbance     vomiting for days  vomiting for days  vomiting for days  vomiting for days, PN: vomiting for days  vomiting for days  vomiting for days  vomiting for days  vomiting for days  vomiting for days  vomiting for days, PN: vomiting for days  vomiting for days  vomiting for days  vomiting for days  vomiting for days  ; HUT Comment: vomiting for days; HUT Reaction: Gastrointestinal; HUT Reaction: Nausea And Vomiting; HUT Reaction Type: Intolerance; HUT Severity: Med; HUT Noted: 2014  vomiting for days       Blood-Group Specific Substance Other (See Comments)     Patient has an anti-Cw and non-specific antibodies. Blood product orders may be delayed. Draw one red top and two purple top tubes for all type/screen/crossmatch orders.     Influenza Vaccines Other (See Comments)     Oseltamivir Hives     med stopped, PN: med stopped     Cephalosporins Rash     Lamotrigine Rash     Possibly associated with Lamictal.   HUT Comment: Possibly associated with Lamictal. ; HUT Reaction: Rash; HUT Reaction Type: Allergy; HUT Severity: Low; HUT Noted: 2018     Latex Rash            Medications:     Medications Prior to Admission   Medication Sig Dispense Refill Last Dose     acetaminophen (TYLENOL) 32 mg/mL liquid Take 31.25 mLs (1,000 mg) by mouth every 6 hours as needed for fever or pain 355 mL 0 2020 at 0430     albuterol (PROAIR HFA) 108 (90 Base) MCG/ACT inhaler Inhale 2 puffs into the lungs 4 times daily as needed    Past Month at Unknown time     alum & mag hydroxide-simethicone (MYLANTA/MAALOX) 200-200-20 MG/5ML SUSP suspension Take 30 mLs by mouth every 6 hours as needed for indigestion   Past Month at Unknown time     [] apixaban ANTICOAGULANT (ELIQUIS STARTER PACK) 5 MG tablet Take 2 tablets (10 mg) by mouth 2 times daily for 7 days, THEN 1 tablet (5 mg) 2  times daily for 23 days. 74 tablet 0 2020 at 2000     busPIRone (BUSPAR) 10 MG tablet Take 1 tablet (10 mg) by mouth twice daily   2020 at 2000     calcium carbonate (TUMS) 500 MG chewable tablet Take 1 chew tab by mouth 3 times daily as needed    Past Month at Unknown time     cloNIDine (CATAPRES) 0.1 MG tablet Take 0.1 mg by mouth 2 times daily    2020 at 2000     desvenlafaxine (PRISTIQ) 100 MG 24 hr tablet Take 1 tablet (100 mg) by mouth every morning   2020 at 0800     diphenhydrAMINE (BENADRYL) 25 MG capsule Take 1-2 capsules (25-50 mg) by mouth every 6 hours as needed for itching or sleep   Past Month at Unknown time     docosanol (ABREVA) 10 % CREA cream Apply to lip 5 times a day as soon as symptoms begin, do not use for more than 10 days.   Unknown at Unknown time     gabapentin (NEURONTIN) 600 MG tablet Take 600 mg by mouth 3 times daily    2020 at 2000     [] hydrOXYzine (ATARAX) 25 MG tablet Take 1 tablet (25 mg) by mouth 3 times daily as needed for anxiety 42 tablet 0 2020 at 2000     levonorgestrel (MIRENA) 20 MCG/24HR IUD 1 each by Intrauterine route once   2020 at Unknown time     lidocaine (XYLOCAINE) 2 % solution Swish and spit 15 mLs in mouth every 6 hours as needed (soar throat) 100 mL 0 Past Week at Unknown time     lurasidone (LATUDA) 60 MG TABS tablet Take 1 tablet (60 mg) by mouth at bedtime   2020 at 2000     metFORMIN (GLUCOPHAGE-XR) 500 MG 24 hr tablet Take 1 tablet (500 mg) by mouth daily   2020 at 1700     ondansetron (ZOFRAN-ODT) 8 MG ODT tab Take 1 tablet (8 mg) by mouth every 6 hours as needed for nausea or vomiting 20 tablet 1 Past Month at Unknown time     oxyCODONE (ROXICODONE) 5 MG tablet Take 5 mg by mouth every 4 hours as needed (pain) (patient rarely uses)   Past Month at Unknown time     pantoprazole (PROTONIX) 40 MG EC tablet Take 1 tablet (40 mg) by mouth 2 times daily 62 tablet 0 2020 at 0400      simethicone  "(MYLICON) 80 MG chewable tablet Take 1 tablet (80 mg) by mouth every 6 hours as needed for flatulence or cramping (after meals) 30 tablet 0 Past Month at Unknown time     sucralfate (CARAFATE) 1 GM/10ML suspension Take 10 mLs (1 g) by mouth 4 times daily 420 mL 1 Past Month at Unknown time     topiramate (TOPAMAX) 100 MG tablet Take 1 tablet (100 mg) by mouth daily at bedtime   1/12/2020 at 2000     vitamin D3 (CHOLECALCIFEROL) 2000 units (50 mcg) tablet Take 1 tablet (2,000 units) by mouth daily   1/12/2020 at 0800             Review of Systems:     A complete 10 point review of systems was performed and is negative except as noted in the HPI           Physical Exam:   BP (!) 139/98   Pulse 103   Temp 98.2  F (36.8  C)   Resp 16   Ht 1.575 m (5' 2\")   Wt 109.8 kg (242 lb)   SpO2 95%   BMI 44.26 kg/m    Wt:   Wt Readings from Last 2 Encounters:   01/17/20 109.8 kg (242 lb)   01/12/20 109.8 kg (242 lb)      Constitutional: cooperative, pleasant, not dyspneic/diaphoretic, no acute distress  Eyes: Sclera anicteric/injected  Ears/nose/mouth/throat: Normal oropharynx without ulcers or exudate, mucus membranes moist, hearing intact  Neck: supple, thyroid normal size  CV: No edema  Respiratory: Unlabored breathing  Lymph: No axillary, submandibular, supraclavicular or inguinal lymphadenopathy  Abd: Nondistended, +bs, no hepatosplenomegaly, nontender, no peritoneal signs  Skin: warm, perfused, no jaundice  Neuro: AAO x 3, No asterixis  Psych: Normal affect  MSK: No gross deformities         Data:   Labs and imaging below were independently reviewed and interpreted    BMP  Recent Labs   Lab 01/17/20 2045 01/13/20  0654    142   POTASSIUM 3.5 3.4   CHLORIDE 109 115*   KELBY 8.9 8.0*   CO2 23 21   BUN 9 10   CR 0.56 0.52   GLC 92 109*     CBC  Recent Labs   Lab 01/17/20 2045 01/13/20  0654   WBC 10.1 6.6   RBC 4.15 3.64*   HGB 11.9 10.5*   HCT 37.2 33.0*   MCV 90 91   MCH 28.7 28.8   MCHC 32.0 31.8   RDW 14.0 13.8 "    236     INR  Recent Labs   Lab 01/17/20 2045   INR 1.16*     LFTs  Recent Labs   Lab 01/17/20 2045   ALKPHOS 79   AST 16   ALT 26   BILITOTAL 0.2   PROTTOTAL 7.5   ALBUMIN 3.4      PANCNo lab results found in last 7 days.    Imaging:  Ab X-ray: Reviewed her abdominal x-ray from 1/18/2020.  It appears that the foreign metallic object is in the distal small bowel.

## 2020-01-18 NOTE — ED NOTES
"Patient arrived back from EGD procedure. Patient states she is having \"extreme abdominal pain\" and is requesting \"something stronger than oxycodone\". Patient also requesting diet order. Gold team paged.  "

## 2020-01-18 NOTE — DISCHARGE SUMMARY
[facility], [place]  Hospitalist Discharge Summary       Date of Admission:  1/17/2020  Date of Discharge:  1/18/2020  Discharging Provider: Liset Blake MD  Discharge Team: Hospitalist Service, Gold 9    Discharge Diagnoses   Nevin Alvarado is a 29 year old woman admitted on 1/17/2020. 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     1) Ingested pen spring   - Ingested this earlier 1/17/2010.  She is having some sharp cramping pain over her LUQ.  CXR shows metal foreign body in her stomach on admission,  but it went to her small bowel by the time EGD was done today, GI recommended repeat KUB in 72 hours to check for complete evacuation.  - appreciate GI input, start regular diet after EGD    2) Goals of care / Guardianship - Patient was just placed under guardianship on 1/16/2020.  Her [facility] and is available at [phone].  Patient currently resides at a [facility] which supports independent living, but her guardian is considering transition to a 24-hour monitored housing environment.  Her guardian asked us to consider a 72 hour hold and psychiatric assessment and this was ordered in the ED.  - 72 hour hold cancelled per psychiatry recommendation at [phone](intake) or extension [phone](psych nurse station) and ROSA MARIA Loving at cell [phone] who graciously spoke with the guardian before discharge  - A review of her chart shows multiple ingestions or rectal insertions as well as 60+ scopes for foreign body removal.  Typically she has these removed in the emergency department and discharges shortly after as she is not actively suicidal.        3) History of pulmonary embolism  - Continue home apixiban     4) Anxiety, depression, bipolar and PTSD  - Continue home clonidine,  latuda, topiramate, buspar, atarax prn and pristiq    Follow-ups Needed After Discharge    Follow up with primary care provider, Latonya Knight, within 7 days for   hospital follow- up and repeat KUB in 3 days as outpatient per GI.    Follow up with Guardian and outside psychiatrist within 7 days of   discharge     return to ER if increased abdominal pain or failure to pass the swallowed spring within 3 days    Unresulted Labs Ordered in the Past 30 Days of this Admission     No orders found for last 31 day(s).        Discharge Disposition   Discharged to group home  Condition at discharge: Stable    Hospital Course   Please see discharge diagnosis    Consultations This Hospital Stay   GI LUMINAL ADULT IP CONSULT  PSYCHIATRY IP CONSULT  PSYCHIATRY IP CONSULT    Code Status   Full Code    Time Spent on this Encounter   I, Liset Blake MD, personally saw the patient today and spent greater than 30 minutes discharging this patient.       Liset Blake MD  [facility], [place]  ______________________________________________________________________    Physical Exam   Vital Signs: Temp: 98  F (36.7  C) Temp src: Oral BP: (!) 136/93 Pulse: 95 Heart Rate: 96 Resp: 16 SpO2: 96 % O2 Device: None (Room air)    Weight: 242 lbs 0 oz  Constitutional: Awake, alertx3, cooperative, no apparent distress.  Eyes: Conjunctiva and pupils examined and normal.  HEENT: Moist mucous membranes, normal dentition.  Respiratory: Clear to auscultation bilaterally, no crackles or wheezing.  Cardiovascular: Regular rate and rhythm, normal S1 and S2, and no murmur noted.  GI: Soft, non-distended, non-tender, normal bowel sounds.  Lymph/Hematologic: No anterior cervical or supraclavicular adenopathy.  Skin: No rashes, no cyanosis, no edema.  Musculoskeletal: No joint swelling, erythema or tenderness.  Neurologic: Cranial nerves 2-12 intact, normal strength and sensation.  Psychiatric: Alert, oriented to person, place and  time, no obvious anxiety or depression.         Primary Care Physician   Latonya Knight    Discharge Orders      General info for SNF    Length of Stay Estimate: Short Term Care: Estimated # of Days <30  Condition at Discharge: Stable  Level of care:psych inpatient  Rehabilitation Potential: Fair  Admission H&P remains valid and up-to-date: Yes  Recent Chemotherapy: N/A  Use Nursing Home Standing Orders: Yes     Mantoux instructions    Give two-step Mantoux (PPD) Per Facility Policy Yes     Reason for your hospital stay    Foreign body ingestion     Activity - Up with nursing assistance     Reason for your hospital stay    Intentional foreign body ingestion     Follow Up and recommended labs and tests    Follow up with primary care provider, Latonya Knight, within 7 days for hospital follow- up.  No follow up labs or test are needed.  Follow up with Guardian and outside psychiatrist within 7 days of discharge     Activity    Your activity upon discharge: activity as tolerated     Follow Up and recommended labs and tests    Repeat KUB in 3 days with PCP, return to ER if increased abdominal pain or failure to pass the swallowed spring. thanks     Advance Diet as Tolerated    Follow this diet upon discharge: Orders Placed This Encounter      Regular Diet Adult     Diet    Follow this diet upon discharge: Orders Placed This Encounter      Regular Diet Adult      Advance Diet as Tolerated       Significant Results and Procedures   Results for orders placed or performed during the hospital encounter of 01/17/20   XR Abdomen 2 Views    Narrative    Exam:  XR ABDOMEN 2 VW, 1/17/2020 6:02 PM    History: Eval for metallic FB    Comparison:  1/12/2020    Findings:  Upright and supine radiographs of the abdomen. Linear  metallic density foreign body projects over the upper abdomen at  midline, likely within the stomach. No abnormally dilated bowel. No  pneumatosis, portal venous gas, or pneumoperitoneum.  Intrauterine  device projects over the pelvis. Visualized lung apices are clear.  Left convex thoracic spinal curvature.      Impression    Impression:    Linear metallic density foreign body projects over the upper abdomen,  likely within the stomach.    I have personally reviewed the examination and initial interpretation  and I agree with the findings.    FLETCHER LEGER MD   XR Abdomen 1 View    Narrative    Exam: XR ABDOMEN 1 VW, 1/18/2020 5:14 AM    Indication: fob    Comparison: Plain film 1/17/2020    Findings:   Supine radiographs of the abdomen. Redemonstration of curvilinear  metallic object projecting over the mid left abdomen. This is likely  within the small bowel. No dilated loops of bowel. No pneumatosis. No  free air. No portal venous gas. No suspicious osseous lesions.  Intrauterine device projects over the pelvis. Pelvic phleboliths. The  visualized lung bases are clear.      Impression    Impression: Curvilinear metallic object projects over the left mid  abdomen, likely within the small bowel.    I have personally reviewed the examination and initial interpretation  and I agree with the findings.    PATRICK FAM, DO       Discharge Medications   Current Discharge Medication List      CONTINUE these medications which have CHANGED    Details   apixaban ANTICOAGULANT (ELIQUIS) 5 MG tablet Take 1 tablet (5 mg) by mouth 2 times daily  Qty: 60 tablet, Refills: 0    Associated Diagnoses: Acute pulmonary embolism without acute cor pulmonale, unspecified pulmonary embolism type (H)      hydrOXYzine (ATARAX) 50 MG tablet Take 1 tablet (50 mg) by mouth 3 times daily as needed for anxiety  Qty: 30 tablet, Refills: 0    Associated Diagnoses: History of posttraumatic stress disorder (PTSD)         CONTINUE these medications which have NOT CHANGED    Details   acetaminophen (TYLENOL) 32 mg/mL liquid Take 31.25 mLs (1,000 mg) by mouth every 6 hours as needed for fever or pain  Qty: 355 mL, Refills: 0     Associated Diagnoses: Esophageal dysphagia; Hx of foreign body ingestion      albuterol (PROAIR HFA) 108 (90 Base) MCG/ACT inhaler Inhale 2 puffs into the lungs 4 times daily as needed       busPIRone (BUSPAR) 10 MG tablet Take 1 tablet (10 mg) by mouth twice daily      Cholecalciferol (VITAMIN D) 50 MCG (2000 UT) CAPS Take 2,000 Units by mouth daily       cloNIDine (CATAPRES) 0.1 MG tablet Take 0.1 mg by mouth 2 times daily       desvenlafaxine (PRISTIQ) 100 MG 24 hr tablet Take 1 tablet (100 mg) by mouth every morning      docosanol (ABREVA) 10 % CREA cream Apply to lip 5 times a day as soon as symptoms begin, do not use for more than 10 days. Used PRN.      fluticasone-salmeterol (ADVAIR) 100-50 MCG/DOSE inhaler Inhale 1 puff into the lungs 2 times daily      gabapentin (NEURONTIN) 600 MG tablet Take 600 mg by mouth 3 times daily       levonorgestrel (MIRENA) 20 MCG/24HR IUD 1 each by Intrauterine route once      ondansetron (ZOFRAN-ODT) 4 MG ODT tab Take 4 mg by mouth every 6 hours as needed for nausea or vomiting       lurasidone (LATUDA) 60 MG TABS tablet Take 1 tablet (60 mg) by mouth at bedtime      metFORMIN (GLUCOPHAGE-XR) 500 MG 24 hr tablet Take 1 tablet (500 mg) by mouth daily      topiramate (TOPAMAX) 100 MG tablet Take 1 tablet (100 mg) by mouth daily at bedtime         STOP taking these medications       alum & mag hydroxide-simethicone (MYLANTA/MAALOX) 200-200-20 MG/5ML SUSP suspension Comments:   Reason for Stopping:         calcium carbonate (TUMS) 500 MG chewable tablet Comments:   Reason for Stopping:         diphenhydrAMINE (BENADRYL) 25 MG capsule Comments:   Reason for Stopping:         oxyCODONE (ROXICODONE) 5 MG tablet Comments:   Reason for Stopping:         pantoprazole (PROTONIX) 40 MG EC tablet Comments:   Reason for Stopping:         simethicone (MYLICON) 80 MG chewable tablet Comments:   Reason for Stopping:         sucralfate (CARAFATE) 1 GM/10ML suspension Comments:   Reason for  Stopping:             Allergies   Allergies   Allergen Reactions     Amoxicillin-Pot Clavulanate Other (See Comments), Rash and Swelling     PN: facial swelling, left side. Also had cortisone injection the same day as she started the Augmentin.  PN: facial swelling, left side. Also had cortisone injection the same day as she started the Augmentin.  Noted in downtime recovery of chart.       Penicillins Anaphylaxis     Vancomycin Swelling, Itching and Rash     Other reaction(s): Redness  Flushed face, very itchy; HUT Comment: Flushed face, very itchy; HUT Reaction: Angioedema; HUT Reaction: Redness; HUT Severity: Med; HUT Noted: [id number]  Flushed face, very itchy  Flushed face, very itchy  Flushed face, very itchy       Hydrocodone Nausea and Vomiting and GI Disturbance     vomiting for days  vomiting for days  vomiting for days  vomiting for days, PN: vomiting for days  vomiting for days  vomiting for days  vomiting for days  vomiting for days  vomiting for days  vomiting for days, PN: vomiting for days  vomiting for days  vomiting for days  vomiting for days  vomiting for days  ; HUT Comment: vomiting for days; HUT Reaction: Gastrointestinal; HUT Reaction: Nausea And Vomiting; HUT Reaction Type: Intolerance; HUT Severity: Med; HUT Noted: [id number]  vomiting for days       Blood-Group Specific Substance Other (See Comments)     Patient has an anti-Cw and non-specific antibodies. Blood product orders may be delayed. Draw one red top and two purple top tubes for all type/screen/crossmatch orders.     Influenza Vaccines Other (See Comments)     Oseltamivir Hives     med stopped, PN: med stopped     Cephalosporins Rash     Lamotrigine Rash     Possibly associated with Lamictal.   HUT Comment: Possibly associated with Lamictal. ; HUT Reaction: Rash; HUT Reaction Type: Allergy; HUT Severity: Low; HUT Noted: [id number]     Latex Rash

## 2020-01-18 NOTE — CONSULTS
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 Anticoagulants:                        The patient has taken Eliquis (apixaban). ASA Grade                        Assessment: III - A patient with severe systemic                        disease. After reviewing the risks and benefits, the                        patient was deemed in satisfactory condition to undergo                        the procedure. The anesthesia plan was to use general                        anesthesia. Immediately prior to administration  of                        medications, the patient was re-assessed for adequacy to                        receive sedatives. The heart rate, respiratory rate,                        oxygen saturations, blood pressure, adequacy of                        pulmonary ventilation, and response to care were                        monitored throughout the  procedure. The physical status                        of the patient was re-assessed after the procedure.                       After obtaining informed consent, the endoscope was                        passed under direct vision. Throughout the procedure,                        the patient's blood pressure, pulse, and oxygen                        saturations were monitored continuously. The Endoscope                        was introduced through the mouth, and advanced to the                        fourth part of duodenum. The upper GI endoscopy was                        accomplished without difficulty. The patient tolerated                        the procedure well.                                                                                   Findings:       The examined esophagus was normal.       Localized erythematous mucosa was found in the gastric antrum.       The examined duodenum was normal.                                                                                    Impression:          - Normal esophagus. Prior scarring noted in the distal                        esophagus.                       - Erythematous mucosa in the antrum.                       - Normal examined duodenum.                       - No specimens collected.  Recommendation:      - [facility]                       - Resume regular diet.                       - Continue present medications.                       - Recommend KUB in 72 hours to assess for passage of                        foreign body.                       - If she develops  "severe abdominal pain, fever or                        leukocytosis, then perform a CT scan to assess for                        perforation.                                                                                     Electronically signed by: Lokesh Paula MD  _____________________  Lokesh Paula,   1/18/2020 9:08:07 AM  I was physically present for the entire viewing portion of the exa m.  __________________________  Signature of teaching physician  Amairani/Jair Paula  Number of Addenda: 0    Note Initiated On: 1/18/2020 8:30 AM  Scope In:  Scope Out:            Physical and Psychiatric Examination:     BP (!) 136/93   Pulse 95   Temp 98  F (36.7  C) (Oral)   Resp 16   Ht 1.575 m (5' 2\")   Wt 109.8 kg (242 lb)   SpO2 96%   BMI 44.26 kg/m    Weight is 242 lbs 0 oz  Body mass index is 44.26 kg/m .    Physical Exam:  I have reviewed the physical exam as documented by the medical team and agree with findings and assessment and have no additional findings to add at this time.    Mental Status Exam  Appearance/grooming/attitude: Calm,  cooperative  Orientation: Alert and oriented to person, place, date, day and situation.   Speech: Normal rate and rhythm with clear articulation  Movements: No tics, tremors, rigidity or abnormal involuntary movements seen  Thought processes: Goal-directed, logical and linear.  She is future oriented  Thought Content: Denies suicidal or homicidal ideation.  Denies auditory or visual hallucinations   Intellectual capacity/fund of knowledge: Appears to be of average intelligence based on use of vocabulary and functional memory, adequate fund of knowledge  mood: \" Disappointed in myself and frustrated\"  Affect: Flat, restricted depressed and congruent  Insight: Fair  Judgement: Fair  Impulse Control: Currently intact, poor when she is experiencing anxiety, rejection, or when stressed however it should be noted that she does contemplate SIB for up to 2 hours before " acting on these thoughts, and she has had some success with her safety plan to avoid SIB and other instances.               DSM-5 Diagnosis:   Major depressive disorder, recurrent. moderate  Borderline Personality Disorder with SIB  PTSD by history  Anorexia Nervosa by history          Assessment:    29 year old single female in ED for swallowing spring from a pen yesterday.  In no acute distress. Plan is to await and confirm passage.   She is not suicidal, homicidal or gravely disabled and therefore does not meet inpatient level of care.  There would likely be limited benefit with institutionalization for SIB as well as additional risk of dependence on institution to provide for her basic safety needs.   Notes indicate that her new guardian has requested psychiatric assessment and this is accomplished in my visit today.  I do note that she has an established relationship with her psychiatric provider, Dr. Brionna De La Rosa,and also with  psychotherapy and behavioral analyst that is working with her in the community,  and the patient plans to establish with outpatient DBT in the near future.    I attempted to reach the guardian Marianna Trujillo at number in  note: [phone]. No answer. Case discussed with Dr. Blake and on call SW.  Guardian wants to discuss case and plan prior to discharge from hospital. Case discussed with ED SW who will also try to reach guardian.     Patient does not want any medication changes at this time. Prefers meds to be handled by her psychiatrist.         Summary of Recommendations:   1. Please discontinue 72 hours hold  2. May discharge to home after confirming plan with emergency guardian  3. Highly recommend increased intensity and frequency of outpatient care and safety plan. Recommend appointment with outpatient psychiatry within one week, therapy within one week;  and to meet with her new guardian on Monday or Tuesday.  She is planning on starting DBT and guardian could  facilitate/monitor patient follow through.  Can also consider change of housing that would provide enhanced staffing.   4. Inpatient psychiatry is not recommended.   Please call with any questions [phone]

## 2020-01-18 NOTE — PROCEDURES
[facility] Brief Operative Note    Procedure:  Upper endoscopy   Post-operative diagnosis:  Ingested forgein body   Staff Physician:  Dr. Lokesh Paula   Fellow/Assistant(s):  NA    Specimens:  None   Findings:  Mild gastritis. No foreign body in upper GI tract.    Complications:  None.   Condition:  Stable   Recommendations  Diet:  Regular diet  PPI:  None  Anti-coagulants/platelets:  Restart anticoagulation now  Octreotide:  N/A  Discharge Planning:   OK to discharge per GI - please see full note for further details.

## 2020-01-18 NOTE — ANESTHESIA PREPROCEDURE EVALUATION
Anesthesia Pre-Procedure Evaluation    Patient: Nevin Alvarado   MRN:     [id number] Gender:   female   Age:    29 year old :      1990        Preoperative Diagnosis: * No pre-op diagnosis entered *   Procedure(s):  ESOPHAGOGASTRODUOENOSCOPY WITH FOREIGN BODY REMOVAL     Past Medical History:   Diagnosis Date     ADD (attention deficit disorder)      Anorexia nervosa with bulimia     history of; on Topamax     Anxiety      Borderline personality disorder (H)      Depression      Depressive disorder      H/O self-harm      Lives in independent group home     due to debilitating mental illness     Migraine without aura     no known triggers; on Topamax bid and Imitrex PRN     Morbid obesity (H)      PTSD (post-traumatic stress disorder)      Rectal foreign body - Recurrent issue, self placed      Swallowed foreign body - Recurrent issue, self placed       Past Surgical History:   Procedure Laterality Date     COMBINED ESOPHAGOSCOPY, GASTROSCOPY, DUODENOSCOPY (EGD), REPLACE ESOPHAGEAL STENT N/A 10/9/2019    Procedure: Upper Endoscopy with Suture Placement;  Surgeon: Zurdo Ramirez MD;  Location: [facility]     ESOPHAGOSCOPY, GASTROSCOPY, DUODENOSCOPY (EGD), COMBINED N/A 3/9/2017    Procedure: COMBINED ESOPHAGOSCOPY, GASTROSCOPY, DUODENOSCOPY (EGD), REMOVE FOREIGN BODY;  Surgeon: Avis Guzmán MD;  Location: [facility]     ESOPHAGOSCOPY, GASTROSCOPY, DUODENOSCOPY (EGD), COMBINED N/A 2017    Procedure: COMBINED ESOPHAGOSCOPY, GASTROSCOPY, DUODENOSCOPY (EGD), REMOVE FOREIGN BODY;  EGD removal Foregin body;  Surgeon: Lokesh Paula MD;  Location: [facility]     ESOPHAGOSCOPY, GASTROSCOPY, DUODENOSCOPY (EGD), COMBINED N/A 2017    Procedure: COMBINED ESOPHAGOSCOPY, GASTROSCOPY, DUODENOSCOPY (EGD);  COMBINED ESOPHAGOSCOPY, GASTROSCOPY, DUODENOSCOPY (EGD) [[id number]]attempted removal of foreign body;  Surgeon: Pamela Perez MD;  Location: [facility]     ESOPHAGOSCOPY,  GASTROSCOPY, DUODENOSCOPY (EGD), COMBINED N/A 6/9/2017    Procedure: COMBINED ESOPHAGOSCOPY, GASTROSCOPY, DUODENOSCOPY (EGD), REMOVE FOREIGN BODY;  Esophagoscopy, Gastroscopy, Duodenoscopy, Removal of Foreign Body;  Surgeon: Dejon Marsh MD;  Location: [facility]     ESOPHAGOSCOPY, GASTROSCOPY, DUODENOSCOPY (EGD), COMBINED N/A 1/6/2018    Procedure: COMBINED ESOPHAGOSCOPY, GASTROSCOPY, DUODENOSCOPY (EGD), REMOVE FOREIGN BODY;  COMBINED ESOPHAGOSCOPY, GASTROSCOPY, DUODENOSCOPY (EGD) [by pascal net and snare with profol sedation;  Surgeon: Feliciano Emmanuel MD;  Location:  GI     ESOPHAGOSCOPY, GASTROSCOPY, DUODENOSCOPY (EGD), COMBINED N/A 3/19/2018    Procedure: COMBINED ESOPHAGOSCOPY, GASTROSCOPY, DUODENOSCOPY (EGD), REMOVE FOREIGN BODY;   Esophagodscopy, Gastroscopy, Duodenoscopy,Foreign Body Removal;  Surgeon: Brice Guzmán MD;  [facility]: [facility]     ESOPHAGOSCOPY, GASTROSCOPY, DUODENOSCOPY (EGD), COMBINED N/A 4/16/2018    Procedure: COMBINED ESOPHAGOSCOPY, GASTROSCOPY, DUODENOSCOPY (EGD), REMOVE FOREIGN BODY;  Esophagogastroduodenoscopy  Foreign Body Removal X 2;  Surgeon: Royer Moise MD;  [facility]: [facility]     ESOPHAGOSCOPY, GASTROSCOPY, DUODENOSCOPY (EGD), COMBINED N/A 6/1/2018    Procedure: COMBINED ESOPHAGOSCOPY, GASTROSCOPY, DUODENOSCOPY (EGD), REMOVE FOREIGN BODY;  COMBINED ESOPHAGOSCOPY, GASTROSCOPY, DUODENOSCOPY with removal of foreign body, propofol sedation by anesthesia;  Surgeon: Brice Martinez MD;  Location:  GI     ESOPHAGOSCOPY, GASTROSCOPY, DUODENOSCOPY (EGD), COMBINED N/A 7/25/2018    Procedure: COMBINED ESOPHAGOSCOPY, GASTROSCOPY, DUODENOSCOPY (EGD), REMOVE FOREIGN BODY;;  Surgeon: Candy Castelan MD;  Location:  GI     ESOPHAGOSCOPY, GASTROSCOPY, DUODENOSCOPY (EGD), COMBINED N/A 7/28/2018    Procedure: COMBINED ESOPHAGOSCOPY, GASTROSCOPY, DUODENOSCOPY (EGD), REMOVE FOREIGN BODY;  COMBINED ESOPHAGOSCOPY, GASTROSCOPY, DUODENOSCOPY (EGD), REMOVE FOREIGN  BODY;  Surgeon: Brice Guzmán MD;  [facility]     ESOPHAGOSCOPY, GASTROSCOPY, DUODENOSCOPY (EGD), COMBINED N/A 7/31/2018    Procedure: COMBINED ESOPHAGOSCOPY, GASTROSCOPY, DUODENOSCOPY (EGD);  COMBINED ESOPHAGOSCOPY, GASTROSCOPY, DUODENOSCOPY (EGD) TO REMOVE FOREIGN BODY;  Surgeon: Lokesh Paula MD;  [facility]     ESOPHAGOSCOPY, GASTROSCOPY, DUODENOSCOPY (EGD), COMBINED N/A 8/4/2018    Procedure: COMBINED ESOPHAGOSCOPY, GASTROSCOPY, DUODENOSCOPY (EGD), REMOVE FOREIGN BODY;   combined esophagoscopy, gastroscopy, duodenoscopy, REMOVE FOREIGN BODY ;  Surgeon: Lokesh Paula MD;  [facility]     ESOPHAGOSCOPY, GASTROSCOPY, DUODENOSCOPY (EGD), COMBINED N/A 10/6/2019    Procedure: ESOPHAGOGASTRODUODENOSCOPY (EGD) with fireign body removal x2, esophageal stent placement with floroscopy;  Surgeon: Timoteo Espana MD;  [facility]     ESOPHAGOSCOPY, GASTROSCOPY, DUODENOSCOPY (EGD), COMBINED N/A 12/2/2019    Procedure: Esophagogastroduodenoscopy with esophageal stent removal, esophogram;  Surgeon: Kailee Tena MD;  [facility]: [facility]     ESOPHAGOSCOPY, GASTROSCOPY, DUODENOSCOPY (EGD), COMBINED N/A 12/17/2019    Procedure: ESOPHAGOGASTRODUODENOSCOPY, WITH FOREIGN BODY REMOVAL;  Surgeon: Pamela Perez MD;  [facility]     ESOPHAGOSCOPY, GASTROSCOPY, DUODENOSCOPY (EGD), COMBINED N/A 12/13/2019    Procedure: ESOPHAGOGASTRODUODENOSCOPY, WITH FOREIGN BODY REMOVAL;  Surgeon: Samia Stanton MD;  [facility]     ESOPHAGOSCOPY, GASTROSCOPY, DUODENOSCOPY (EGD), COMBINED N/A 12/28/2019    Procedure: ESOPHAGOGASTRODUODENOSCOPY (EGD) Removal of Foreign Body X 2;  Surgeon: Huy Kelley MD;  [facility]     ESOPHAGOSCOPY, GASTROSCOPY, DUODENOSCOPY (EGD), COMBINED N/A 1/5/2020    Procedure: ESOPHAGOGASTRODUOENOSCOPY WITH FOREIGN BODY REMOVAL;  Surgeon: Pamela Perez MD;  [facility]     ESOPHAGOSCOPY, GASTROSCOPY, DUODENOSCOPY (EGD), COMBINED  N/A 1/3/2020    Procedure: ESOPHAGOGASTRODUODENOSCOPY (EGD) REMOVAL OF FOREIGN BODY.;  Surgeon: Pamela Perez MD;  [facility]     ESOPHAGOSCOPY, GASTROSCOPY, DUODENOSCOPY (EGD), COMBINED N/A 1/13/2020    Procedure: ESOPHAGOGASTRODUODENOSCOPY (EGD) for foreign body removal;  Surgeon: Lokesh Paula MD;  [facility]     EXAM UNDER ANESTHESIA ANUS N/A 1/10/2017    Procedure: EXAM UNDER ANESTHESIA ANUS;  Surgeon: Annmarie Haynes MD;  [facility]     EXAM UNDER ANESTHESIA RECTUM N/A 7/19/2018    Procedure: EXAM UNDER ANESTHESIA RECTUM;  EXAM UNDER ANESTHESIA, REMOVAL OF RECTAL FOREIGN BODY;  Surgeon: Annmarie Haynes MD;  [facility]: [facility]     HC REMOVE FECAL IMPACTION OR FB W ANESTHESIA N/A 12/18/2016    Procedure: REMOVE FECAL IMPACTION/FOREIGN BODY UNDER ANESTHESIA;  Surgeon: Soham Cano MD;  Location:  OR     KNEE SURGERY - removed a small tissue mass from knee Right      LAPAROSCOPIC ABLATION ENDOMETRIOSIS       LAPAROTOMY EXPLORATORY N/A 1/10/2017    Procedure: LAPAROTOMY EXPLORATORY;  Surgeon: Annmarie Haynes MD;  Location: [facility]     LAPAROTOMY EXPLORATORY  09/11/2019    Manual manipulation of bowel to remove pill bottle in rectum     lymph nodes removed from neck; benign  age 7     MAMMOPLASTY REDUCTION Bilateral      RELEASE CARPAL TUNNEL Bilateral      SIGMOIDOSCOPY FLEXIBLE N/A 1/10/2017    Procedure: SIGMOIDOSCOPY FLEXIBLE;  Surgeon: Annmarie Haynes MD;  [facility]: [facility]     SIGMOIDOSCOPY FLEXIBLE N/A 5/8/2018    Procedure: SIGMOIDOSCOPY FLEXIBLE;  flex sig with foreign body removal using snare and rattooth forcep;  Surgeon: Soham Cano MD;  Location:  GI     SIGMOIDOSCOPY FLEXIBLE N/A 2/20/2019    Procedure: Exam under anesthesia Colonoscopy with attempted  removal of rectal foreign body;  Surgeon: Estrada Chávez MD;  Location: [facility]          Anesthesia Evaluation     . Pt has had prior anesthetic. Type: General and  MAC    No history of anesthetic complications          ROS/MED HX    ENT/Pulmonary:  - neg pulmonary ROS   (+)ZOHRA risk factors obese, , . .    Neurologic:     (+)neuropathy migraines,     Cardiovascular:     (+) ----. : . . fainting (syncope). :. .       METS/Exercise Tolerance:  >4 METS   Hematologic:  - neg hematologic  ROS   (+) History of blood clots (PE 11/2019) pt is anticoagulated, Anemia (Hb=10.5), Other Hematologic Disorder-red cell antibody       Musculoskeletal:  - neg musculoskeletal ROS       GI/Hepatic: Comment: Hx multiple foreign body ingestions/  insertions  esophogeal perforation, stenting 10/9/2019  Stent removed 12/03  Now swallowed pen spring     (+) GERD       Renal/Genitourinary:  - ROS Renal section negative       Endo:  - neg endo ROS   (+) Obesity (morbid), .      Psychiatric: Comment: PTSD, Intermittent suicidal ideation  ADHD    (+) psychiatric history anxiety, depression and bipolar      Infectious Disease:  - neg infectious disease ROS       Malignancy:      - no malignancy   Other:    (+) No chance of pregnancy C-spine cleared: N/A, no H/O Chronic Pain,no other significant disability   - neg other ROS                     PHYSICAL EXAM:   Mental Status/Neuro: A/A/O   Airway: Facies: Feasible  Mallampati: II  Mouth/Opening: Full  TM distance: > 6 cm  Neck ROM: Full   Respiratory: Auscultation: CTAB     Resp. Rate: Normal     Resp. Effort: Normal      CV: Rhythm: Regular  Rate: Age appropriate  Heart: Normal Sounds  Edema: None   Comments:       Habitus: Obesity; Morbid               LABS:  CBC:   Lab Results   Component Value Date    WBC 6.6 01/13/2020    WBC 9.5 01/01/2020    HGB 10.5 (L) 01/13/2020    HGB 11.9 01/01/2020    HCT 33.0 (L) 01/13/2020    HCT 36.5 01/01/2020     01/13/2020     01/01/2020     BMP:   Lab Results   Component Value Date     01/13/2020     12/28/2019    POTASSIUM 3.4 01/13/2020    POTASSIUM 3.6 12/28/2019    CHLORIDE 115 (H) 01/13/2020  "   CHLORIDE 114 (H) 12/28/2019    CO2 21 01/13/2020    CO2 24 12/28/2019    BUN 10 01/13/2020    BUN 6 (L) 12/28/2019    CR 0.52 01/13/2020    CR 0.48 (L) 12/28/2019     (H) 01/13/2020     (H) 12/28/2019     COAGS:   Lab Results   Component Value Date    PTT 51 (H) 12/28/2019    INR 1.42 (H) 12/28/2019     POC:   Lab Results   Component Value Date    BGM 99 11/03/2019    HCG Negative 12/02/2019    HCGS Negative 11/28/2019     OTHER:   Lab Results   Component Value Date    LACT 1.0 11/28/2019    KELBY 8.0 (L) 01/13/2020    PHOS 3.5 12/01/2019    MAG 1.9 12/05/2019    ALBUMIN 3.1 (L) 12/09/2019    PROTTOTAL 6.2 (L) 12/09/2019    ALT 24 12/09/2019    AST 12 12/09/2019    ALKPHOS 73 12/09/2019    BILITOTAL 0.2 12/09/2019    LIPASE 40 (L) 12/04/2019    TSH 0.66 03/21/2018    T4 0.96 05/16/2017    CRP 6.6 12/09/2019    SED 26 (H) 07/11/2017        Preop Vitals    BP Readings from Last 3 Encounters:   01/17/20 138/79   01/13/20 122/82   01/05/20 132/88    Pulse Readings from Last 3 Encounters:   01/17/20 97   01/13/20 97   01/05/20 83      Resp Readings from Last 3 Encounters:   01/17/20 18   01/13/20 16   01/05/20 16    SpO2 Readings from Last 3 Encounters:   01/17/20 100%   01/13/20 98%   01/05/20 99%      Temp Readings from Last 1 Encounters:   01/17/20 37.3  C (99.2  F) (Oral)    Ht Readings from Last 1 Encounters:   01/17/20 1.575 m (5' 2\")      Wt Readings from Last 1 Encounters:   01/17/20 109.8 kg (242 lb)    Estimated body mass index is 44.26 kg/m  as calculated from the following:    Height as of an earlier encounter on 1/17/20: 1.575 m (5' 2\").    Weight as of an earlier encounter on 1/17/20: 109.8 kg (242 lb).     LDA:  Port A Cath Single 12/17/19 Right Chest wall (Active)   Number of days: 31        Assessment:   ASA SCORE: 3 emergent   H&P: History and physical reviewed and following examination; no interval change.   Smoking Status:  Non-Smoker/Unknown   NPO Status: NPO Appropriate     Plan: "   Anes. Type:  General   Pre-Medication: None   Induction:  IV (Standard)   Airway: ETT; Oral   Access/Monitoring: PIV   Maintenance: Balanced     Postop Plan:   Postop Pain: None  Postop Sedation/Airway: Not planned  Disposition: Inpatient/Admit     PONV Management: Adult Risk Factors: Female   Prevention: Ondansetron, Dexamethasone     CONSENT: Direct conversation   Plan and risks discussed with: Patient   Blood Products: Consent Deferred (Minimal Blood Loss)       Comments for Plan/Consent:  Mask Ventilation: Not attempted (RSI); Ease of Intubation: Easy; Airway Size: 7;  Cuffed;  Oral;  Blade Type: C-Mac;  Blade Size: 3;  Place by: Siddharth ;  Insertion Attempts: 2 (Required ETT to be curved to pass the VC even with Gr 1 view of VC);  Secured at (cm)to lip: 23 cm;  Breath Sounds: Equal, clear and bilateral;  End Tidal CO2: Present;  Dentition: Intact, Unchanged;  Grade View of Cords: 1 (Gr1 with VL);  Airway Adjuncts:  C-Mac                     Alley Castle MD

## 2020-01-18 NOTE — PROGRESS NOTES
The patient was seen in psychiatry consult: full note to follow.    She is not suicidal, homicidal or gravely disabled and therefore does not meet inpatient level of care.  Thre would likely be limited benefit with institutionalization for SIB.  Notes indicate that her new guardian has requested psychiatric assessment and this is accomplished  In my visit today but I do note that she has an established relationship with her psychiatric provider, Dr. Brionna De La Rosa, psychotherapy and behavioral analyst that is working with her in the community and the patient plans to establish with outpatient DBT in the near future.    I attempted to reach the guardian Marianna Trujillo at number in  note: [phone]. No answer. I LM with my number to call back directly to discuss the patient's care and recommendation to discharge home today with enhanced follow up: to see outpatient psychiatry within one week,  Has psychotherapy appointment this week. It would be extremely helpful if new guardian meets with patient this week to ease patient's  anxiety about guardianship.  No med changes at this time.

## 2020-01-18 NOTE — OR NURSING
Dr. Castaneda in PACU post op with patient. Patient asked about pain meds, was told there is a pain care plan in place and narcotics aren't indicated. Patient has requested to talk with surgeon about procedure. Dr. Chai madrid.

## 2020-01-18 NOTE — PROGRESS NOTES
"Emergency Social Work Services Note    Date of  Intervention: 01/18/20  Last Emergency Department Visit:  1/12/19  Care Plan: Extensive care plan related to history of self-injurious behavior including ingestion of foreign bodies and significant mental health hx. please see problem list for full ED treatment plan.  Collaborated with:  Chart review, ED RN     Data: Nevin continues to board in the ED.  Per consult with ED RN, Nevin is medically stable so medical team is coordinating with inpatient psychiatry to see if should be eligible for admission.  Otherwise, anticipate medical admission with psychiatry consultation.  Per staff documentation, Nevin is voluntary and has not attempted to leave although her guardian requested a 72 hour hold.  She does have a new court appointed guardian who will be making medical decisions on her behalf.    Per DEC assessment, \"patient was at high risk due to recurrent SIB, impaired judgment, insight and limited coping skills.  Patient was appropriate for inpatient service for further mental health stabilization.\" Also noted that, \" patient preferred to be discharged home...Patient's legal guardian reviewed and agreed with inpatient service recommendation.\"    Intervention: Care coordination.  Per public court records, confirmed guardianship hearing [facility] 1/16/2020 but we do not yet have a copy of her guardianship document.    Consulted with behavioral health intake.  We will need to clarify the legal status, question of why 72-hour hold placed when she has a guardian in place already.  Also need info about specific needs for psych admission, what has changed from Nevin's baseline behavior? Behavioral intake will further discuss case with medical team to determine needs.       SW consulted with Dr. Blake along with guardian via phone.  Guardian is newly appointed and not as familiar with Nevin's history and whether recent behavior is a change from her " usual baseline.  From guardian's perspective, she does not think there is an acute change but would appreciate further recommendations from our psychiatry consult. She notes that Nevin's behavioral analyst had recommended further psychiatric assessment.  She would be in agreement for Nevin to return to her apartment, (mental health staff are onsite 24 hours/day) after medical and psychiatrically cleared.     Assessment:  mental illness, ED care plan due to frequent ED/hospital visits due to self-injurious behavior-ingestion and insertions, new emergency guardianship in place as of 1/16/2020    Plan:    Anticipated Disposition:  Return to mental health group home once medically cleared and psychiatric consult completed, please update guardian re discharge.     Barriers to d/c plan:  TBD - Pending patient's progress and further recommendation.  Anticipate need for psychiatry consultation prior to discharge.    Follow Up: Staff will need to update court appointed guardian regarding plan of care and discharge options. Guardian would be in agreement for Nevin to return to her apartment, (mental health staff are onsite 24 hours/day) after medically and psychiatrically cleared.     Marianna Kathleen Arteagaan at [facility] ([phone]) please keep her updated regarding medical/psychiatric plans.  She MUST be consulted prior to discharge about disposition.    Matilde Cespedes, St. Joseph HospitalSW, [facility] CityW  Social Work Services/Care Management, Casual staff  [facility]  on-call/after hours pager [phone]    For weekend social work needs, contact information below and avail on [employer]:  4A, 4C, 4E, 5A, 5B pager [phone]  6A, 6B, 6C, 6D  pager [phone]  7A, 7B, 7C, 7D, 5C pager [phone]    For weekend RN care coordinator needs (home d/c with needs incl home care, assisted living facility returns, durable medical equip, IV antibiotics) - page via job code [id number]

## 2020-01-18 NOTE — PROGRESS NOTES
[facility] Progress Note - Hospitalist Service, Gold 9       Date of Admission:  1/17/2020  Assessment & Plan   Nevin Alvarado is a 29 year old woman admitted on 1/17/2020. 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     1) Ingested pen spring   - Ingested this earlier 1/17/2010.  She is having some sharp cramping pain over her LUQ.  CXR shows metal foreign body in her stomach on admission,  but it went to her small bowel by the time EGD was done today, GI recommended repeat KUB in 72 hours to check for complete evacuation.  - appreciate GI input, start regular diet after EGD  - Swallowing precautions, 1:1 sitter while inpatient     2) Goals of care / Guardianship - Patient was just placed under guardianship on 1/16/2020.  Her guardian is Marianna Wang at [facility] and is available at [phone].  Patient currently resides at a [facility] which supports independent living, but her guardian is considering transition to a 24-hour monitored housing environment.  Her guardian asked us to consider a 72 hour hold and psychiatric assessment and this was ordered in the ED.  - 72 hour hold  - Consulted psychiatry at [phone] or extension [phone]  - A review of her chart shows multiple ingestions or rectal insertions as well as 60+ scopes for foreign body removal.  Typically she has these removed in the emergency department and discharges shortly after as she is not actively suicidal.    Her guardian was hoping we might be able to break the cycle and ensure she can live somewhere with closer monitoring.     3) History of pulmonary embolism  - Continue home apixiban     4) Anxiety, depression, bipolar and PTSD  - Continue home clonidine, latuda, topiramate  -refuse to increase buspar for  "anxiety as she stated that hydroxyzine was just added and \"not working\"  -add low dose ativan prn per patient's insistence  -hold pristiq while on ultram prn    Diet: Regular Diet Adult  Advance Diet as Tolerated    DVT Prophylaxis: Pneumatic Compression Devices  Hazel Catheter: not present  Code Status: Full Code      Disposition Plan   Expected discharge: Tomorrow, recommended to group home once safe disposition plan available.  Entered: Liset Blake MD 01/18/2020, 2:20 PM       The patient's care was discussed with the Patient and guardian.    Liset Blake MD  Hospitalist Service, [address], [place]  Pager:   Please see sticky note for cross cover information  ______________________________________________________________________    Interval History   She stated that tylenol doesn't work, wants something stronger    Data reviewed today: I reviewed all medications, new labs and imaging results over the last 24 hours. I personally reviewed .    KUB  Impression: Curvilinear metallic object projects over the left mid  abdomen, likely within the small bowel.    Physical Exam   Vital Signs: Temp: 98  F (36.7  C) Temp src: Oral BP: (!) 136/93 Pulse: 95 Heart Rate: 96 Resp: 16 SpO2: 96 % O2 Device: None (Room air)    Weight: 242 lbs 0 oz  Constitutional: Awake, alertx3, cooperative, no apparent distress.  Eyes: Conjunctiva and pupils examined and normal.  HEENT: Moist mucous membranes, normal dentition.  Respiratory: Clear to auscultation bilaterally, no crackles or wheezing.  Cardiovascular: Regular rate and rhythm, normal S1 and S2, and no murmur noted.  GI: Soft, non-distended, non-tender, normal bowel sounds.  Lymph/Hematologic: No anterior cervical or supraclavicular adenopathy.  Skin: No rashes, no cyanosis, no edema.  Musculoskeletal: No joint swelling, erythema or tenderness.  Neurologic: Cranial nerves 2-12 intact, normal strength and sensation.  Psychiatric: Alert, oriented to " person, place and time, no obvious anxiety or depression.    Data   Recent Labs   Lab 01/17/20 2045 01/13/20  0654   WBC 10.1 6.6   HGB 11.9 10.5*   MCV 90 91    236   INR 1.16*  --     142   POTASSIUM 3.5 3.4   CHLORIDE 109 115*   CO2 23 21   BUN 9 10   CR 0.56 0.52   ANIONGAP 8 6   KELBY 8.9 8.0*   GLC 92 109*   ALBUMIN 3.4  --    PROTTOTAL 7.5  --    BILITOTAL 0.2  --    ALKPHOS 79  --    ALT 26  --    AST 16  --

## 2020-01-18 NOTE — PROGRESS NOTES
Discussed case with ED staff and OR .     At this time, there is no availability for an urgent upper endoscopy.  Technically the American Society for Gastroenterological Endoscopy recommends that a sharp object in the stomach to be removed urgently (12-24 hours).      We will plan on urgent endoscopy to be scheduled in the AM of 1/18/20.     It would be reasonable to transfer this patient to another hospital facility for upper endoscopy today (1/17/20).

## 2020-01-18 NOTE — PHARMACY-ADMISSION MEDICATION HISTORY
Admission medication history interview status for the 1/17/2020 admission is complete. See Epic admission navigator for allergy information, pharmacy, prior to admission medications and immunization status.     Medication history interview sources:    - Patient  - Dispense Report  - [facility]    Changes made to PTA medication list (reason)  Added:   - Advair 100-50 Diskus (per patient and fill history)  - Hydroxyzine 50 mg tabs (per patient and fill history)  Deleted:   - Alum & Mag Hydroxide-Simethicone 200-200-20 mg/5 mL suspension; 30 mL PO Q6H PRN indigestion (stopped per patient)  - Calcium Carbonate 500 mg chew tabs; 1T PO TID PRN (stopped per patient)  - Diphenhydramine 25 mg caps; 1-2C PO Q6H PRN itching/sleep (stopped per patient)  - Lidocaine 2% solution; swish & spit 15 mL Q6H PRN sore throat (stopped per patient and fill history)  - Oxycodone 5 mg tabs; 1T PO Q4H PRN pain (stopped per patient and fill history)  - Pantoprazole 40 mg EC tabs; 2T PO BID (stopped per patient)  - Simethicone 80 mg chew tab; 1T PO Q6H PRN (stopped per patient and fill history)  - Sucralfate 1 g/10 mL suspension; 10 mL QID (stopped per patient)  Changed:   - Ondansetron 8 mg ODT tabs; 1T Q6H PRN n/v --> 4 mg ODT tab; 1T Q6H PRN n/v (per patient and fill history)    Additional medication history information (including reliability of information, actions taken by pharmacist):  - Reliability of Information: moderate; patient was able to recall most medication names, some strengths, and last doses.  - Adherence: patient reports missing ~ 2 doses per week of her scheduled medications and reports not using her daily inhaler (Advair) on a regular basis.      Prior to Admission medications    Medication Sig Last Dose Taking? Auth Provider   acetaminophen (TYLENOL) 32 mg/mL liquid Take 31.25 mLs (1,000 mg) by mouth every 6 hours as needed for fever or pain Past Month Yes Dominga Red MD   albuterol (PROAIR HFA) 108 (90 Base)  MCG/ACT inhaler Inhale 2 puffs into the lungs 4 times daily as needed  Past Month Yes Reported, Patient   apixaban ANTICOAGULANT (ELIQUIS) 5 MG tablet Take 1 tablet (5 mg) by mouth 2 times daily 1/17/2020 at AM Yes Liset Blake MD   busPIRone (BUSPAR) 10 MG tablet Take 1 tablet (10 mg) by mouth twice daily 1/17/2020 at 1 dose in AM Yes Unknown, Entered By History   Cholecalciferol (VITAMIN D) 50 MCG (2000 UT) CAPS Take 2,000 Units by mouth daily  1/17/2020 at AM Yes Unknown, Entered By History   cloNIDine (CATAPRES) 0.1 MG tablet Take 0.1 mg by mouth 2 times daily  1/17/2020 at 1 dose in AM Yes Reported, Patient   desvenlafaxine (PRISTIQ) 100 MG 24 hr tablet Take 1 tablet (100 mg) by mouth every morning 1/17/2020 at AM Yes Reported, Patient   docosanol (ABREVA) 10 % CREA cream Apply to lip 5 times a day as soon as symptoms begin, do not use for more than 10 days. Used PRN. Past Month Yes Unknown, Entered By History   fluticasone-salmeterol (ADVAIR) 100-50 MCG/DOSE inhaler Inhale 1 puff into the lungs 2 times daily Past Month Yes Unknown, Entered By History   gabapentin (NEURONTIN) 600 MG tablet Take 600 mg by mouth 3 times daily  1/17/2020 at 1 dose in AM & 1 dose in PM Yes Reported, Patient   hydrOXYzine (ATARAX) 50 MG tablet Take 50 mg by mouth 3 times daily as needed for anxiety 1/17/2020 at 1 dose in AM Yes Unknown, Entered By History   levonorgestrel (MIRENA) 20 MCG/24HR IUD 1 each by Intrauterine route once  Yes Unknown, Entered By History   ondansetron (ZOFRAN-ODT) 4 MG ODT tab Take 4 mg by mouth every 6 hours as needed for nausea or vomiting  Past Month Yes Unknown, Entered By History   lurasidone (LATUDA) 60 MG TABS tablet Take 1 tablet (60 mg) by mouth at bedtime 1/16/2020 at PM  Reported, Patient   metFORMIN (GLUCOPHAGE-XR) 500 MG 24 hr tablet Take 1 tablet (500 mg) by mouth daily 1/16/2020 at PM  Unknown, Entered By History   topiramate (TOPAMAX) 100 MG tablet Take 1 tablet (100 mg) by mouth daily at  bedtime 1/16/2020 at PM  Unknown, Entered By History     Medication history completed by: Mimi Jenkins, Pharmacy Intern

## 2020-01-18 NOTE — ANESTHESIA CARE TRANSFER NOTE
"Patient: Nevin Alvarado    Procedure(s):  Diagnostic ESOPHAGOGASTRODUODENOSCOPY (EGD    Diagnosis: * No pre-op diagnosis entered *  Diagnosis Additional Information: No value filed.    Anesthesia Type:   General     Note:  Airway :Face Mask  Patient transferred to:PACU  Comments: VSS. Breathing spontaneously at a regular rate with adequate tidal volumes and maintaining O2 sats on 6L via facemask. No immediate post-op nausea. No apparent complications from anesthesia.     Patient requesting opioids and told by myself that she would not be given opioids for pain control.  Offered Tylenol which patient accepted.     Siddharth \"Nathan\" Tonya CHACON MD  CA-1 Handoff Report: Identifed the Patient, Identified the Reponsible Provider, Reviewed the pertinent medical history, Discussed the surgical course, Reviewed Intra-OP anesthesia mangement and issues during anesthesia, Set expectations for post-procedure period and Allowed opportunity for questions and acknowledgement of understanding      Vitals: (Last set prior to Anesthesia Care Transfer)    CRNA VITALS  1/18/2020 0827 - 1/18/2020 0912      1/18/2020             Resp Rate (observed):  15                Electronically Signed By: Siddharth Castaneda III, MD  January 18, 2020  9:12 AM  "

## 2020-01-18 NOTE — ED NOTES
Guardian Marianna contacted by RN and Psych MD with response. Per Psych MD, and primary care team, pt is ok to discharge.

## 2020-01-18 NOTE — ANESTHESIA POSTPROCEDURE EVALUATION
Anesthesia POST Procedure Evaluation    Patient: Nevin Alvarado   MRN:     [id number] Gender:   female   Age:    29 year old :      1990        Preoperative Diagnosis: * No pre-op diagnosis entered *   Procedure(s):  Diagnostic ESOPHAGOGASTRODUODENOSCOPY (EGD   Postop Comments: No value filed.       Anesthesia Type:  Not documented  General    Reportable Event: NO     PAIN: Uncomplicated   Sign Out status: Comfortable, Well controlled pain     PONV: No PONV   Sign Out status:  No Nausea or Vomiting     Neuro/Psych: Uneventful perioperative course   Sign Out Status: Preoperative baseline; Age appropriate mentation     Airway/Resp.: Uneventful perioperative course   Sign Out Status: Non labored breathing, age appropriate RR; Resp. Status within EXPECTED Parameters     CV: Uneventful perioperative course   Sign Out status: Appropriate BP and perfusion indices; Appropriate HR/Rhythm     Disposition:   Sign Out in:  PACU  Disposition:  Floor  Recovery Course: Uneventful  Follow-Up: Not required           Last Anesthesia Record Vitals:  CRNA VITALS  2020 0827 - 2020 0927      2020             Resp Rate (observed):  15          Last PACU Vitals:  Vitals Value Taken Time   /82 2020  9:30 AM   Temp     Pulse 98 2020  9:30 AM   Resp 16 2020  9:15 AM   SpO2 96 % 2020  9:35 AM   Temp src     NIBP     Pulse     SpO2     Resp     Temp     Ht Rate     Temp 2     Vitals shown include unvalidated device data.      Electronically Signed By: Norma Ravi MD, 2020, 9:36 AM

## 2020-01-18 NOTE — H&P
[facility], [place]    History and Physical - Hospitalist Service, Gold Night       Date of Admission:  1/17/2020    Assessment & Plan   Nevin Alvarado is a 29 year old woman admitted on 1/17/2020. 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    1) Ingested pen spring - Ingested this earlier today.  She is having some sharp cramping pain over her LUQ.  CXR shows metal foreign body in her stomach.   - Consult GI for removal  - NPO overnight  - Swallowing precautions, 1:1 sitter while inpatient    2) Goals of care / Guardianship - Patient was just placed under guardianship on 1/16/2020.  Her guardian is Marianna Wang at [employer] and is available at [phone].  Patient currently resides at a [facility] which supports independent living, but her guardian is considering transition to a 24-hour monitored housing environment.  Her guardian asked us to consider a 72 hour hold and psychiatric assessment and this was ordered in the ED.  - 72 hour hold  - Consult psychiatry  - A review of her chart shows multiple ingestions or rectal insertionsas well as 60+ scopes for foreign body removal.  Typically she has these removed in the emergency department and discharges shortly after as she is not actively suicidal.    Her guardian was hoping we might be able to break the cycle and ensure she can live somewhere with closer monitoring.    3) History of pulmonary embolism  - Continue home apixiban    4) Anxiety, depression, bipolar and PTSD  - Continue home buspar, clonidine, desvenlafaxine, hydroxyzine, latuda, topiramate       Diet: NPO per Anesthesia Guidelines for Procedure/Surgery Except for: Meds    DVT Prophylaxis: Ambulatory  Hazel Catheter: not present  Code Status: Full    Disposition Plan   Expected discharge:  2 - 3 days, recommended to prior living arrangement once seen by GI, psychiatry.  Entered: HU Davis CNP 01/17/2020, 8:24 PM     The patient's care was discussed with the Attending Physician, Dr. Frost.    HU Davis CNP  [facility], [place]  Pager: [phone]  Please see sticky note for cross cover information  ______________________________________________________________________    Chief Complaint   Swallowed foreign body    History is obtained from the patient    History of Present Illness   Nevin Alvarado is a 29 year old woman admitted on 1/17/2020. 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    The patient reports that today she ingested a pen spring after an episode of her compulsive anxiety.  This is a frequent issue with her (97 EGDs in care everywhere and our system per my review of her chart) and she has had six ED visits for this since the new year.  She has some LUQ pain she describes as sharp when she bends over.    Otherwise she is in her usual state of health.  She denies any fevers, chills, chest pain, shortness of breath, nausea or vomiting.    Of note the patient just acquired a guardian as of 1/16/2020.  See 1/17 social work note for full details, but in brief she has a new guardian, Marianna Trujillo, available at [phone].  Her guardian is very concerned that her behavior continues with essentially no change and was considering moving her to a 24 hour monitored group home.    Review of Systems    The 10 point Review of Systems is negative other than noted in the HPI or here.     Past Medical History    I have reviewed this patient's medical history and updated it with pertinent information if needed.   Past Medical History:   Diagnosis Date     ADD (attention deficit  disorder)      Anorexia nervosa with bulimia     history of; on Topamax     Anxiety      Borderline personality disorder (H)      Depression      Depressive disorder      H/O self-harm      Lives in independent group home     due to debilitating mental illness     Migraine without aura     no known triggers; on Topamax bid and Imitrex PRN     Morbid obesity (H)      PTSD (post-traumatic stress disorder)      Rectal foreign body - Recurrent issue, self placed      Swallowed foreign body - Recurrent issue, self placed        Past Surgical History   I have reviewed this patient's surgical history and updated it with pertinent information if needed.  Past Surgical History:   Procedure Laterality Date     COMBINED ESOPHAGOSCOPY, GASTROSCOPY, DUODENOSCOPY (EGD), REPLACE ESOPHAGEAL STENT N/A 10/9/2019    Procedure: Upper Endoscopy with Suture Placement;  Surgeon: Zurdo Ramirez MD;  [facility]     ESOPHAGOSCOPY, GASTROSCOPY, DUODENOSCOPY (EGD), COMBINED N/A 3/9/2017    Procedure: COMBINED ESOPHAGOSCOPY, GASTROSCOPY, DUODENOSCOPY (EGD), REMOVE FOREIGN BODY;  Surgeon: Avis Guzmán MD;  [facility]     ESOPHAGOSCOPY, GASTROSCOPY, DUODENOSCOPY (EGD), COMBINED N/A 4/20/2017    Procedure: COMBINED ESOPHAGOSCOPY, GASTROSCOPY, DUODENOSCOPY (EGD), REMOVE FOREIGN BODY;  EGD removal Foregin body;  Surgeon: Lokesh Paula MD;  [facility]     ESOPHAGOSCOPY, GASTROSCOPY, DUODENOSCOPY (EGD), COMBINED N/A 6/12/2017    Procedure: COMBINED ESOPHAGOSCOPY, GASTROSCOPY, DUODENOSCOPY (EGD);  COMBINED ESOPHAGOSCOPY, GASTROSCOPY, DUODENOSCOPY (EGD) [[id number]]attempted removal of foreign body;  Surgeon: Pamela Perez MD;  [facility]     ESOPHAGOSCOPY, GASTROSCOPY, DUODENOSCOPY (EGD), COMBINED N/A 6/9/2017    Procedure: COMBINED ESOPHAGOSCOPY, GASTROSCOPY, DUODENOSCOPY (EGD), REMOVE FOREIGN BODY;  Esophagoscopy, Gastroscopy, Duodenoscopy, Removal of Foreign Body;  Surgeon: Maximo  Dejon Messina MD;  [facility]     ESOPHAGOSCOPY, GASTROSCOPY, DUODENOSCOPY (EGD), COMBINED N/A 1/6/2018    Procedure: COMBINED ESOPHAGOSCOPY, GASTROSCOPY, DUODENOSCOPY (EGD), REMOVE FOREIGN BODY;  COMBINED ESOPHAGOSCOPY, GASTROSCOPY, DUODENOSCOPY (EGD) [by pascal net and snare with profol sedation;  Surgeon: Feliciano Emmanuel MD;  Location:  GI     ESOPHAGOSCOPY, GASTROSCOPY, DUODENOSCOPY (EGD), COMBINED N/A 3/19/2018    Procedure: COMBINED ESOPHAGOSCOPY, GASTROSCOPY, DUODENOSCOPY (EGD), REMOVE FOREIGN BODY;   Esophagodscopy, Gastroscopy, Duodenoscopy,Foreign Body Removal;  Surgeon: Brice Guzmán MD;  [facility]     ESOPHAGOSCOPY, GASTROSCOPY, DUODENOSCOPY (EGD), COMBINED N/A 4/16/2018    Procedure: COMBINED ESOPHAGOSCOPY, GASTROSCOPY, DUODENOSCOPY (EGD), REMOVE FOREIGN BODY;  Esophagogastroduodenoscopy  Foreign Body Removal X 2;  Surgeon: Royer Moise MD;  [facility]     ESOPHAGOSCOPY, GASTROSCOPY, DUODENOSCOPY (EGD), COMBINED N/A 6/1/2018    Procedure: COMBINED ESOPHAGOSCOPY, GASTROSCOPY, DUODENOSCOPY (EGD), REMOVE FOREIGN BODY;  COMBINED ESOPHAGOSCOPY, GASTROSCOPY, DUODENOSCOPY with removal of foreign body, propofol sedation by anesthesia;  Surgeon: Brice Martinez MD;  Location:  GI     ESOPHAGOSCOPY, GASTROSCOPY, DUODENOSCOPY (EGD), COMBINED N/A 7/25/2018    Procedure: COMBINED ESOPHAGOSCOPY, GASTROSCOPY, DUODENOSCOPY (EGD), REMOVE FOREIGN BODY;;  Surgeon: Candy Castelan MD;  Location:  GI     ESOPHAGOSCOPY, GASTROSCOPY, DUODENOSCOPY (EGD), COMBINED N/A 7/28/2018    Procedure: COMBINED ESOPHAGOSCOPY, GASTROSCOPY, DUODENOSCOPY (EGD), REMOVE FOREIGN BODY;  COMBINED ESOPHAGOSCOPY, GASTROSCOPY, DUODENOSCOPY (EGD), REMOVE FOREIGN BODY;  Surgeon: Brice Guzmán MD;  [facility]     ESOPHAGOSCOPY, GASTROSCOPY, DUODENOSCOPY (EGD), COMBINED N/A 7/31/2018    Procedure: COMBINED ESOPHAGOSCOPY, GASTROSCOPY, DUODENOSCOPY (EGD);  COMBINED ESOPHAGOSCOPY,  GASTROSCOPY, DUODENOSCOPY (EGD) TO REMOVE FOREIGN BODY;  Surgeon: Lokesh Paula MD;  [facility]     ESOPHAGOSCOPY, GASTROSCOPY, DUODENOSCOPY (EGD), COMBINED N/A 8/4/2018    Procedure: COMBINED ESOPHAGOSCOPY, GASTROSCOPY, DUODENOSCOPY (EGD), REMOVE FOREIGN BODY;   combined esophagoscopy, gastroscopy, duodenoscopy, REMOVE FOREIGN BODY ;  Surgeon: Lokesh Paula MD;  [facility]     ESOPHAGOSCOPY, GASTROSCOPY, DUODENOSCOPY (EGD), COMBINED N/A 10/6/2019    Procedure: ESOPHAGOGASTRODUODENOSCOPY (EGD) with fireign body removal x2, esophageal stent placement with floroscopy;  Surgeon: Timoteo Espana MD;  [facility]     ESOPHAGOSCOPY, GASTROSCOPY, DUODENOSCOPY (EGD), COMBINED N/A 12/2/2019    Procedure: Esophagogastroduodenoscopy with esophageal stent removal, esophogram;  Surgeon: Kailee Tena MD;  [facility]     ESOPHAGOSCOPY, GASTROSCOPY, DUODENOSCOPY (EGD), COMBINED N/A 12/17/2019    Procedure: ESOPHAGOGASTRODUODENOSCOPY, WITH FOREIGN BODY REMOVAL;  Surgeon: Pamela Perez MD;  [facility]     ESOPHAGOSCOPY, GASTROSCOPY, DUODENOSCOPY (EGD), COMBINED N/A 12/13/2019    Procedure: ESOPHAGOGASTRODUODENOSCOPY, WITH FOREIGN BODY REMOVAL;  Surgeon: Samia Stanton MD;  [facility]     ESOPHAGOSCOPY, GASTROSCOPY, DUODENOSCOPY (EGD), COMBINED N/A 12/28/2019    Procedure: ESOPHAGOGASTRODUODENOSCOPY (EGD) Removal of Foreign Body X 2;  Surgeon: Huy Kelley MD;  [facility]     ESOPHAGOSCOPY, GASTROSCOPY, DUODENOSCOPY (EGD), COMBINED N/A 1/5/2020    Procedure: ESOPHAGOGASTRODUOENOSCOPY WITH FOREIGN BODY REMOVAL;  Surgeon: Pamela Perez MD;  [facility]     ESOPHAGOSCOPY, GASTROSCOPY, DUODENOSCOPY (EGD), COMBINED N/A 1/3/2020    Procedure: ESOPHAGOGASTRODUODENOSCOPY (EGD) REMOVAL OF FOREIGN BODY.;  Surgeon: Pamela Perez MD;  [facility]     ESOPHAGOSCOPY, GASTROSCOPY, DUODENOSCOPY (EGD), COMBINED N/A 1/13/2020     Procedure: ESOPHAGOGASTRODUODENOSCOPY (EGD) for foreign body removal;  Surgeon: Lokesh Paula MD;  [facility]     EXAM UNDER ANESTHESIA ANUS N/A 1/10/2017    Procedure: EXAM UNDER ANESTHESIA ANUS;  Surgeon: Annmarie Haynes MD;  [facility]     EXAM UNDER ANESTHESIA RECTUM N/A 7/19/2018    Procedure: EXAM UNDER ANESTHESIA RECTUM;  EXAM UNDER ANESTHESIA, REMOVAL OF RECTAL FOREIGN BODY;  Surgeon: Annmarie Haynes MD;  [facility]     HC REMOVE FECAL IMPACTION OR FB W ANESTHESIA N/A 12/18/2016    Procedure: REMOVE FECAL IMPACTION/FOREIGN BODY UNDER ANESTHESIA;  Surgeon: Soham Cano MD;  Location: [facility]     KNEE SURGERY - removed a small tissue mass from knee Right      LAPAROSCOPIC ABLATION ENDOMETRIOSIS       LAPAROTOMY EXPLORATORY N/A 1/10/2017    Procedure: LAPAROTOMY EXPLORATORY;  Surgeon: Annmarie Haynes MD;  [facility]     LAPAROTOMY EXPLORATORY  09/11/2019    Manual manipulation of bowel to remove pill bottle in rectum     lymph nodes removed from neck; benign  age 7     MAMMOPLASTY REDUCTION Bilateral      RELEASE CARPAL TUNNEL Bilateral      SIGMOIDOSCOPY FLEXIBLE N/A 1/10/2017    Procedure: SIGMOIDOSCOPY FLEXIBLE;  Surgeon: Annmarie Haynes MD;  [facility]     SIGMOIDOSCOPY FLEXIBLE N/A 5/8/2018    Procedure: SIGMOIDOSCOPY FLEXIBLE;  flex sig with foreign body removal using snare and rattooth forcep;  Surgeon: Soham Cano MD;  Location:  GI     SIGMOIDOSCOPY FLEXIBLE N/A 2/20/2019    Procedure: Exam under anesthesia Colonoscopy with attempted  removal of rectal foreign body;  Surgeon: Estrada Chávez MD;  [facility]       Social History   I have reviewed this patient's social history and updated it with pertinent information if needed.  Social History     Tobacco Use     Smoking status: Never Smoker     Smokeless tobacco: Never Used   Substance Use Topics     Alcohol use: No     Alcohol/week: 0.0 standard drinks      Drug use: No       Family History   I have reviewed this patient's family history and updated it with pertinent information if needed.   Family History   Problem Relation Age of Onset     Diabetes Type 2  Maternal Grandmother      Diabetes Type 2  Paternal Grandmother      Breast Cancer Paternal Grandmother      Cerebrovascular Disease Father 53     Myocardial Infarction No family hx of      Coronary Artery Disease Early Onset No family hx of      Ovarian Cancer No family hx of      Colon Cancer No family hx of      Prior to Admission Medications   Prior to Admission Medications   Prescriptions Last Dose Informant Patient Reported? Taking?   acetaminophen (TYLENOL) 32 mg/mL liquid   No No   Sig: Take 31.25 mLs (1,000 mg) by mouth every 6 hours as needed for fever or pain   albuterol (PROAIR HFA) 108 (90 Base) MCG/ACT inhaler  Self Yes No   Sig: Inhale 2 puffs into the lungs 4 times daily as needed    alum & mag hydroxide-simethicone (MYLANTA/MAALOX) 200-200-20 MG/5ML SUSP suspension   Yes No   Sig: Take 30 mLs by mouth every 6 hours as needed for indigestion   apixaban ANTICOAGULANT (ELIQUIS STARTER PACK) 5 MG tablet   No No   Sig: Take 2 tablets (10 mg) by mouth 2 times daily for 7 days, THEN 1 tablet (5 mg) 2 times daily for 23 days.   busPIRone (BUSPAR) 10 MG tablet  Self Yes No   Sig: Take 1 tablet (10 mg) by mouth twice daily   calcium carbonate (TUMS) 500 MG chewable tablet   Yes No   Sig: Take 1 chew tab by mouth 3 times daily as needed    cloNIDine (CATAPRES) 0.1 MG tablet  Self Yes No   Sig: Take 0.1 mg by mouth 2 times daily    desvenlafaxine (PRISTIQ) 100 MG 24 hr tablet  Self Yes No   Sig: Take 1 tablet (100 mg) by mouth every morning   diphenhydrAMINE (BENADRYL) 25 MG capsule  Self Yes No   Sig: Take 1-2 capsules (25-50 mg) by mouth every 6 hours as needed for itching or sleep   docosanol (ABREVA) 10 % CREA cream   Yes No   Sig: Apply to lip 5 times a day as soon as symptoms begin, do not use for more  than 10 days.   gabapentin (NEURONTIN) 600 MG tablet  Self Yes No   Sig: Take 600 mg by mouth 3 times daily    hydrOXYzine (ATARAX) 25 MG tablet   No No   Sig: Take 1 tablet (25 mg) by mouth 3 times daily as needed for anxiety   levonorgestrel (MIRENA) 20 MCG/24HR IUD   Yes No   Si each by Intrauterine route once   lidocaine (XYLOCAINE) 2 % solution   No No   Sig: Swish and spit 15 mLs in mouth every 6 hours as needed (soar throat)   lurasidone (LATUDA) 60 MG TABS tablet  Self Yes No   Sig: Take 1 tablet (60 mg) by mouth at bedtime   metFORMIN (GLUCOPHAGE-XR) 500 MG 24 hr tablet  Self Yes No   Sig: Take 1 tablet (500 mg) by mouth daily   ondansetron (ZOFRAN-ODT) 8 MG ODT tab  Self No No   Sig: Take 1 tablet (8 mg) by mouth every 6 hours as needed for nausea or vomiting   oxyCODONE (ROXICODONE) 5 MG tablet   Yes No   Sig: Take 5 mg by mouth every 4 hours as needed (pain) (patient rarely uses)   pantoprazole (PROTONIX) 40 MG EC tablet   No No   Sig: Take 1 tablet (40 mg) by mouth 2 times daily   simethicone (MYLICON) 80 MG chewable tablet   No No   Sig: Take 1 tablet (80 mg) by mouth every 6 hours as needed for flatulence or cramping (after meals)   sucralfate (CARAFATE) 1 GM/10ML suspension   No No   Sig: Take 10 mLs (1 g) by mouth 4 times daily   topiramate (TOPAMAX) 100 MG tablet  Self Yes No   Sig: Take 1 tablet (100 mg) by mouth daily at bedtime   vitamin D3 (CHOLECALCIFEROL) 2000 units (50 mcg) tablet  Self Yes No   Sig: Take 1 tablet (2,000 units) by mouth daily      Facility-Administered Medications: None     Allergies   Allergies   Allergen Reactions     Amoxicillin-Pot Clavulanate Other (See Comments), Rash and Swelling     PN: facial swelling, left side. Also had cortisone injection the same day as she started the Augmentin.  PN: facial swelling, left side. Also had cortisone injection the same day as she started the Augmentin.  Noted in downtime recovery of chart.       Penicillins Anaphylaxis      Vancomycin Swelling, Itching and Rash     Other reaction(s): Redness  Flushed face, very itchy; HUT Comment: Flushed face, very itchy; HUT Reaction: Angioedema; HUT Reaction: Redness; HUT Severity: Med; HUT Noted: [id number]  Flushed face, very itchy  Flushed face, very itchy  Flushed face, very itchy       Hydrocodone Nausea and Vomiting and GI Disturbance     vomiting for days  vomiting for days  vomiting for days  vomiting for days, PN: vomiting for days  vomiting for days  vomiting for days  vomiting for days  vomiting for days  vomiting for days  vomiting for days, PN: vomiting for days  vomiting for days  vomiting for days  vomiting for days  vomiting for days  ; HUT Comment: vomiting for days; HUT Reaction: Gastrointestinal; HUT Reaction: Nausea And Vomiting; HUT Reaction Type: Intolerance; HUT Severity: Med; HUT Noted: [id number]  vomiting for days       Blood-Group Specific Substance Other (See Comments)     Patient has an anti-Cw and non-specific antibodies. Blood product orders may be delayed. Draw one red top and two purple top tubes for all type/screen/crossmatch orders.     Influenza Vaccines Other (See Comments)     Oseltamivir Hives     med stopped, PN: med stopped     Cephalosporins Rash     Lamotrigine Rash     Possibly associated with Lamictal.   HUT Comment: Possibly associated with Lamictal. ; HUT Reaction: Rash; HUT Reaction Type: Allergy; HUT Severity: Low; HUT Noted: [id number]     Latex Rash       Physical Exam   Vital Signs: Temp: 99.2  F (37.3  C) Temp src: Oral BP: 138/79 Pulse: 97   Resp: 18 SpO2: 100 % O2 Device: None (Room air)    Weight: 242 lbs 0 oz    Physical Exam   Constitutional:   Well nourished, well developed, resting comfortably   Head: Normocephalic and atraumatic.   Eyes: Conjunctivae are normal. Pupils are equal, round, and reactive to light.  Pharynx has no erythema or exudate, mucous membranes are moist  Neck:   No adenopathy, no bony tenderness  Cardiovascular: Regular rate  and rhythm without murmurs or gallops  Pulmonary/Chest: Clear to auscultation bilaterally, with no wheezes or retractions. No respiratory distress.  GI: Soft with good bowel sounds.  Mild LUQ tenderness, non-distended, with no guarding, no rebound, no peritoneal signs.   Back:  No bony or CVA tenderness   Musculoskeletal:  No edema or clubbing   Skin: Skin is warm and dry. No rash noted.   Neurological: Alert and oriented to person, place, and time. Nonfocal exam  Psychiatric:  Normal mood and affect.      Data   Data reviewed today: I reviewed all medications, new labs and imaging results over the last 24 hours. I personally reviewed her labs and AXR from today.

## 2020-01-19 ENCOUNTER — PATIENT OUTREACH (OUTPATIENT)
Dept: CARE COORDINATION | Facility: CLINIC | Age: 30
End: 2020-01-19
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
## 2020-01-20 NOTE — TELEPHONE ENCOUNTER
[facility]: Post-Discharge Note  SITUATION                                                      Admission:    Admission Date: 01/17/20   Reason for Admission: Ingested pen spring   Discharge:   Discharge Date: 01/18/20  Discharge Diagnosis: Ingested pen spring   Discharge Service: Hospitalist    BACKGROUND                                                      Nevin Alvarado is a 29 year old woman admitted on 1/17/2020. 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    ASSESSMENT      Discharge Assessment  Patient reports symptoms are: Unchanged(No new symptoms, some abdominal pain but tolerable. )  Does the patient have all of their medications?: Yes  Does patient know what their new medications are for?: Not applicable  Does patient have a follow-up appointment scheduled?: Yes  Does patient have any other questions or concerns?: No    Post-op  Did the patient have surgery or a procedure: Yes  Fever: No  Eating & Drinking: eating and drinking without complaints/concerns  PO Intake: regular diet  Bowel Function: normal  Urinary Status: voiding without complaint/concerns    PLAN                                                      Outpatient Plan:      Follow up with primary care provider, Latonya Knight, within 7 days for hospital follow- up.  No follow up labs or test are needed.  Follow up with Guardian and outside psychiatrist within 7 days of discharge    No future appointments.        Saskia Parsons, CMA

## 2020-01-21 ENCOUNTER — DOCUMENTATION ONLY (OUTPATIENT)
Dept: OTHER | Facility: CLINIC | Age: 30
End: 2020-01-21

## 2020-01-28 NOTE — PROGRESS NOTES
10/16/19 1457   General Information   Onset Date 10/15/19   Start of Care Date 10/16/19   Referring Physician Tao Vaz PA-C   Patient Profile Review/OT: Additional Occupational Profile Info See Profile for full history and prior level of function   Patient/Family Goals Statement None stated   Swallowing Evaluation Videofluoroscopic evaluation   Behaviorial Observations WFL (within functional limits)   Mode of current nutrition NPO   Respiratory Status Room air   Comments SLP: Pt is a 28 year old female who underwent esophageal stent placement on 10/6/19 for an esophageal perforati after a foreign body retrieval. She has a complex psychosocial history including multiple foreign body ingestions/insertions requiring surgical retrieval. She presented to the ED with abdominal pain. Pt endorsing one episode of choking on liquids after stent placement. MD ordered swallow study with esophogram to confirm stent adequacy.    Clinical Swallow Evaluation   Oral Musculature Comments WFL   VFSS Eval: Radiology   Radiologist Resident   Views Taken left lateral   Physical Location of Procedure [facility] [address]   VFSS Eval: Thin Liquid Texture Trial   Mode of Presentation, Thin Liquid cup;straw;self-fed   Order of Presentation 1,2,5  (cup (omnipaque), cup, straw)   Preparatory Phase WFL   Oral Phase, Thin Liquid WFL   Pharyngeal Phase, Thin Liquid WFL   Rosenbek's Penetration Aspiration Scale: Thin Liquid Trial Results 1 - no aspiration, contrast does not enter airway   Diagnostic Statement WFL, no penetration or aspiration   VFSS Eval: Puree Solid Texture Trial   Mode of Presentation, Puree spoon;self-fed   Order of Presentation 3   Preparatory Phase WFL   Oral Phase, Puree WFL   Pharyngeal Phase, Puree WFL   Rosenbek's Penetration Aspiration Scale: Puree Food Trial Results 1 - no aspiration, contrast does not enter airway   Diagnostic Statement WFL, no penetration or aspiration   VFSS Eval: Solid Food Texture  VSS post procedure.  Pt able to ambulate with supervision and cane post procedure.  Discharge instructions and follow up information reviewed.  Pt verbalizes understanding of instructions.  Pt discharged home via wheelchair to front entrance to wait for medical transport.   Trial   Mode of Presentation, Solid self-fed   Order of Presentation 4   Preparatory Phase WFL   Oral Phase, Solid WFL   Pharyngeal Phase, Solid WFL   Rosenbek's Penetration Aspiration Scale: Solid Food Trial Results 1 - no aspiration, contrast does not enter airway   Diagnostic Statement WFL, no penetration or aspiration   Esophageal Phase of Swallow   Esophageal comments See radiologist report   Swallow Eval: Clinical Impressions   Skilled Criteria for Therapy Intervention Current level of function same as previous level of function   Functional Assessment Scale (FAS) 7   Treatment Diagnosis Functional oropharyngeal swallow mechanism   Diet texture recommendations Regular diet;Thin liquids   Recommended Feeding/Eating Techniques small sips/bites;alternate between small bites and sips of food/liquid  (slow rate, upright for all PO)   Demonstrates Need for Referral to Another Service dietitian   Risks and Benefits of Treatment have been explained. Yes   Patient, family and/or staff in agreement with Plan of Care Yes   Clinical Impression Comments SLP: Videoswallow study completed per MD order to objectively assess oropharyngeal swallow mechanism. Under fluoroscopy, pt presents with a functional oropharyngeal swallow mechanism. Pt assessed with thin omnipaque, thin barium, puree consistency, and higher texture solids. Oral phase WFL. Swallow trigger timely. Once triggered, velar elevation, BOT retraction, and pharyngeal constriction are WFL. Epiglottic inversion complete. No penetration or aspiration observed with any consistency. From SLP perspective, pt cleared for up to regular diet/thin liquids; will defer diet recommendation to MD. Pt to be fully alert and upright for all PO, taking small bites/sips at slow rate. No additional SLP services indicated.   Total Evaluation Time   Total Evaluation Time (Minutes) 12

## 2020-02-04 ENCOUNTER — ANESTHESIA - HEALTHEAST (OUTPATIENT)
Dept: SURGERY | Facility: CLINIC | Age: 30
End: 2020-02-04

## 2020-02-04 ASSESSMENT — MIFFLIN-ST. JEOR
SCORE: 1798.63
SCORE: 1798.63

## 2020-02-05 ENCOUNTER — SURGERY - HEALTHEAST (OUTPATIENT)
Dept: SURGERY | Facility: CLINIC | Age: 30
End: 2020-02-05

## 2020-02-05 ENCOUNTER — DOCUMENTATION ONLY (OUTPATIENT)
Dept: OTHER | Facility: CLINIC | Age: 30
End: 2020-02-05

## 2020-02-07 ENCOUNTER — DOCUMENTATION ONLY (OUTPATIENT)
Dept: OTHER | Facility: CLINIC | Age: 30
End: 2020-02-07

## 2020-02-08 ENCOUNTER — HEALTH MAINTENANCE LETTER (OUTPATIENT)
Age: 30
End: 2020-02-08

## 2020-02-15 ENCOUNTER — NURSE TRIAGE (OUTPATIENT)
Dept: NURSING | Facility: CLINIC | Age: 30
End: 2020-02-15

## 2020-02-15 ENCOUNTER — APPOINTMENT (OUTPATIENT)
Dept: GENERAL RADIOLOGY | Facility: CLINIC | Age: 30
End: 2020-02-15
Attending: EMERGENCY MEDICINE
Payer: COMMERCIAL

## 2020-02-15 ENCOUNTER — HOSPITAL ENCOUNTER (EMERGENCY)
Facility: CLINIC | Age: 30
Discharge: HOME | End: 2020-02-16
Attending: EMERGENCY MEDICINE | Admitting: EMERGENCY MEDICINE
Payer: COMMERCIAL

## 2020-02-15 DIAGNOSIS — R07.89 CHEST PRESSURE: ICD-10-CM

## 2020-02-15 LAB
BASOPHILS # BLD AUTO: 0 10E9/L (ref 0–0.2)
BASOPHILS NFR BLD AUTO: 0.5 %
DIFFERENTIAL METHOD BLD: ABNORMAL
EOSINOPHIL # BLD AUTO: 0.2 10E9/L (ref 0–0.7)
EOSINOPHIL NFR BLD AUTO: 2.5 %
ERYTHROCYTE [DISTWIDTH] IN BLOOD BY AUTOMATED COUNT: 13.5 % (ref 10–15)
HCT VFR BLD AUTO: 36.6 % (ref 35–47)
HGB BLD-MCNC: 11.5 G/DL (ref 11.7–15.7)
IMM GRANULOCYTES # BLD: 0 10E9/L (ref 0–0.4)
IMM GRANULOCYTES NFR BLD: 0.4 %
LYMPHOCYTES # BLD AUTO: 2.5 10E9/L (ref 0.8–5.3)
LYMPHOCYTES NFR BLD AUTO: 31.6 %
MCH RBC QN AUTO: 28 PG (ref 26.5–33)
MCHC RBC AUTO-ENTMCNC: 31.4 G/DL (ref 31.5–36.5)
MCV RBC AUTO: 89 FL (ref 78–100)
MONOCYTES # BLD AUTO: 0.6 10E9/L (ref 0–1.3)
MONOCYTES NFR BLD AUTO: 7.6 %
NEUTROPHILS # BLD AUTO: 4.6 10E9/L (ref 1.6–8.3)
NEUTROPHILS NFR BLD AUTO: 57.4 %
NRBC # BLD AUTO: 0 10*3/UL
NRBC BLD AUTO-RTO: 0 /100
PLATELET # BLD AUTO: 283 10E9/L (ref 150–450)
RBC # BLD AUTO: 4.11 10E12/L (ref 3.8–5.2)
WBC # BLD AUTO: 8.1 10E9/L (ref 4–11)

## 2020-02-15 PROCEDURE — 99284 EMERGENCY DEPT VISIT MOD MDM: CPT | Mod: 25 | Performed by: EMERGENCY MEDICINE

## 2020-02-15 PROCEDURE — 80053 COMPREHEN METABOLIC PANEL: CPT | Performed by: EMERGENCY MEDICINE

## 2020-02-15 PROCEDURE — 99285 EMERGENCY DEPT VISIT HI MDM: CPT | Mod: 25 | Performed by: EMERGENCY MEDICINE

## 2020-02-15 PROCEDURE — 84439 ASSAY OF FREE THYROXINE: CPT | Performed by: EMERGENCY MEDICINE

## 2020-02-15 PROCEDURE — 93010 ELECTROCARDIOGRAM REPORT: CPT | Mod: Z6 | Performed by: EMERGENCY MEDICINE

## 2020-02-15 PROCEDURE — 84484 ASSAY OF TROPONIN QUANT: CPT | Performed by: EMERGENCY MEDICINE

## 2020-02-15 PROCEDURE — 84443 ASSAY THYROID STIM HORMONE: CPT | Performed by: EMERGENCY MEDICINE

## 2020-02-15 PROCEDURE — 85379 FIBRIN DEGRADATION QUANT: CPT | Performed by: EMERGENCY MEDICINE

## 2020-02-15 PROCEDURE — 83690 ASSAY OF LIPASE: CPT | Performed by: EMERGENCY MEDICINE

## 2020-02-15 PROCEDURE — 71046 X-RAY EXAM CHEST 2 VIEWS: CPT

## 2020-02-15 PROCEDURE — 93005 ELECTROCARDIOGRAM TRACING: CPT | Performed by: EMERGENCY MEDICINE

## 2020-02-15 PROCEDURE — 85025 COMPLETE CBC W/AUTO DIFF WBC: CPT | Performed by: EMERGENCY MEDICINE

## 2020-02-15 NOTE — ED AVS SNAPSHOT
[facility], [place], Emergency Department  [address] [place] [id number]  Phone:  [phone]                                    Nevin Alvarado   MRN: [id number]    Department:  [facility], Emergency Department   Date of Visit:  2/15/2020           After Visit Summary Signature Page    I have received my discharge instructions, and my questions have been answered. I have discussed any challenges I see with this plan with the nurse or doctor.    ..........................................................................................................................................  Patient/Patient Representative Signature      ..........................................................................................................................................  Patient Representative Print Name and Relationship to Patient    ..................................................               ................................................  Date                                   Time    ..........................................................................................................................................  Reviewed by Signature/Title    ...................................................              ..............................................  Date                                               Time          22EPIC Rev 08/18

## 2020-02-16 VITALS
RESPIRATION RATE: 18 BRPM | TEMPERATURE: 98 F | OXYGEN SATURATION: 98 % | SYSTOLIC BLOOD PRESSURE: 125 MMHG | HEART RATE: 77 BPM | DIASTOLIC BLOOD PRESSURE: 75 MMHG

## 2020-02-16 LAB
ALBUMIN SERPL-MCNC: 3.1 G/DL (ref 3.4–5)
ALP SERPL-CCNC: 95 U/L (ref 40–150)
ALT SERPL W P-5'-P-CCNC: 20 U/L (ref 0–50)
ANION GAP SERPL CALCULATED.3IONS-SCNC: 8 MMOL/L (ref 3–14)
AST SERPL W P-5'-P-CCNC: 21 U/L (ref 0–45)
BILIRUB SERPL-MCNC: 0.2 MG/DL (ref 0.2–1.3)
BUN SERPL-MCNC: 12 MG/DL (ref 7–30)
CALCIUM SERPL-MCNC: 9 MG/DL (ref 8.5–10.1)
CHLORIDE SERPL-SCNC: 112 MMOL/L (ref 94–109)
CO2 SERPL-SCNC: 21 MMOL/L (ref 20–32)
CREAT SERPL-MCNC: 0.58 MG/DL (ref 0.52–1.04)
D DIMER PPP FEU-MCNC: 0.5 UG/ML FEU (ref 0–0.5)
GFR SERPL CREATININE-BSD FRML MDRD: >90 ML/MIN/{1.73_M2}
GLUCOSE SERPL-MCNC: 120 MG/DL (ref 70–99)
INTERPRETATION ECG - MUSE: NORMAL
LIPASE SERPL-CCNC: 146 U/L (ref 73–393)
POTASSIUM SERPL-SCNC: 3.8 MMOL/L (ref 3.4–5.3)
PROT SERPL-MCNC: 7.3 G/DL (ref 6.8–8.8)
SODIUM SERPL-SCNC: 141 MMOL/L (ref 133–144)
T4 FREE SERPL-MCNC: 0.78 NG/DL (ref 0.76–1.46)
TROPONIN I SERPL-MCNC: <0.015 UG/L (ref 0–0.04)
TSH SERPL DL<=0.005 MIU/L-ACNC: 4.56 MU/L (ref 0.4–4)

## 2020-02-16 ASSESSMENT — ENCOUNTER SYMPTOMS
FEVER: 0
COUGH: 1
CHEST TIGHTNESS: 1

## 2020-02-16 NOTE — ED NOTES
Bed: ED17  Expected date:   Expected time:   Means of arrival:   Comments:  HE 29F CHEST PAIN, 12 lead good

## 2020-02-16 NOTE — TELEPHONE ENCOUNTER
"Patient reports pain in lungs and chest area. Had blood clot back in November 2019. Pain started 2 hours ago. Pain is on left side of chest. Says pain feels pressure like. Per protocol, advised to call 911. Patient says she will go in to the [facility]. Asked if she is going to call ambulance, she said \"probably\"    Norma Teran RN/[facility]    Reason for Disposition    [1] Chest pain lasts > 5 minutes AND [2] described as crushing, pressure-like, or heavy    Additional Information    Negative: Severe difficulty breathing (e.g., struggling for each breath, speaks in single words)    Negative: Difficult to awaken or acting confused (e.g., disoriented, slurred speech)    Negative: Shock suspected (e.g., cold/pale/clammy skin, too weak to stand, low BP, rapid pulse)    Negative: [1] Chest pain lasts > 5 minutes AND [2] history of heart disease  (i.e., heart attack, bypass surgery, angina, angioplasty, CHF; not just a heart murmur)    Protocols used: CHEST PAIN-A-AH      "

## 2020-02-16 NOTE — ED TRIAGE NOTES
Arrives BIBA c/o pinpoint chest pain at both sides of ribs with inspiration. Also central, sharp chest pain and pressure. States pain started at 1900 this evening, Deep breaths make it worse. ASA per medics with minimal relief, no other meds taken for pain. A&Ox4, vitally stable on room air.

## 2020-02-16 NOTE — DISCHARGE INSTRUCTIONS
Please make an appointment to follow up with Your Primary Care Provider and GI Clinic (phone: [phone]) as soon as possible.

## 2020-02-16 NOTE — ED PROVIDER NOTES
History     Chief Complaint   Patient presents with     Chest Pain     HPI  Nevin Alvarado is a 29 year old female with a history of PE and multiple admissions for intentional swallowed foreign body who presents to the Emergency Department today for evaluation of chest pain.  The patient reports that she was sitting watching TV at 7 PM when she developed her chest pain.  She reports that her chest pain is worse with deep breathing and states that it is in both sides of her chest.  Her chest pain is described to be a sharp pain.  She also reports having a chest tightness.  She states that she has never had pain like this before.  She is concerned for recurrent PE.  The patient denies fever or recent cold.  She does note having a dry cough the past few days.  The patient otherwise reports that she has been getting food stuck in her throat when she swallows resulting in her vomiting it back up since she had a esophageal perforation.  The patient states that she would also like her thyroid checked as she has been losing hair.    I have reviewed the Medications, Allergies, Past Medical and Surgical History, and Social History in the [employer] system.    Past Medical History:   Diagnosis Date     ADD (attention deficit disorder)      Anorexia nervosa with bulimia     history of; on Topamax     Anxiety      Borderline personality disorder (H)      Depression      Depressive disorder      H/O self-harm      Lives in independent group home     due to debilitating mental illness     Migraine without aura     no known triggers; on Topamax bid and Imitrex PRN     Morbid obesity (H)      PTSD (post-traumatic stress disorder)      Rectal foreign body - Recurrent issue, self placed      Swallowed foreign body - Recurrent issue, self placed        Past Surgical History:   Procedure Laterality Date     COMBINED ESOPHAGOSCOPY, GASTROSCOPY, DUODENOSCOPY (EGD), REPLACE ESOPHAGEAL STENT N/A 10/9/2019    Procedure: Upper Endoscopy  with Suture Placement;  Surgeon: Zurdo Ramirez MD;  [facility]     ESOPHAGOSCOPY, GASTROSCOPY, DUODENOSCOPY (EGD), COMBINED N/A 3/9/2017    Procedure: COMBINED ESOPHAGOSCOPY, GASTROSCOPY, DUODENOSCOPY (EGD), REMOVE FOREIGN BODY;  Surgeon: Avis Guzmán MD;  [facility]     ESOPHAGOSCOPY, GASTROSCOPY, DUODENOSCOPY (EGD), COMBINED N/A 4/20/2017    Procedure: COMBINED ESOPHAGOSCOPY, GASTROSCOPY, DUODENOSCOPY (EGD), REMOVE FOREIGN BODY;  EGD removal Foregin body;  Surgeon: Lokesh Paula MD;  [facility]     ESOPHAGOSCOPY, GASTROSCOPY, DUODENOSCOPY (EGD), COMBINED N/A 6/12/2017    Procedure: COMBINED ESOPHAGOSCOPY, GASTROSCOPY, DUODENOSCOPY (EGD);  COMBINED ESOPHAGOSCOPY, GASTROSCOPY, DUODENOSCOPY (EGD) [[id number]]attempted removal of foreign body;  Surgeon: Pamela Perez MD;  [facility]     ESOPHAGOSCOPY, GASTROSCOPY, DUODENOSCOPY (EGD), COMBINED N/A 6/9/2017    Procedure: COMBINED ESOPHAGOSCOPY, GASTROSCOPY, DUODENOSCOPY (EGD), REMOVE FOREIGN BODY;  Esophagoscopy, Gastroscopy, Duodenoscopy, Removal of Foreign Body;  Surgeon: Dejon Marsh MD;  [facility]     ESOPHAGOSCOPY, GASTROSCOPY, DUODENOSCOPY (EGD), COMBINED N/A 1/6/2018    Procedure: COMBINED ESOPHAGOSCOPY, GASTROSCOPY, DUODENOSCOPY (EGD), REMOVE FOREIGN BODY;  COMBINED ESOPHAGOSCOPY, GASTROSCOPY, DUODENOSCOPY (EGD) [by pascal net and snare with profol sedation;  Surgeon: Feliciano Emmanuel MD;  Location:  GI     ESOPHAGOSCOPY, GASTROSCOPY, DUODENOSCOPY (EGD), COMBINED N/A 3/19/2018    Procedure: COMBINED ESOPHAGOSCOPY, GASTROSCOPY, DUODENOSCOPY (EGD), REMOVE FOREIGN BODY;   Esophagodscopy, Gastroscopy, Duodenoscopy,Foreign Body Removal;  Surgeon: Brice Guzmán MD;  [facility]     ESOPHAGOSCOPY, GASTROSCOPY, DUODENOSCOPY (EGD), COMBINED N/A 4/16/2018    Procedure: COMBINED ESOPHAGOSCOPY, GASTROSCOPY, DUODENOSCOPY (EGD), REMOVE FOREIGN BODY;  Esophagogastroduodenoscopy   Foreign Body Removal X 2;  Surgeon: Royer Moise MD;  [facility]     ESOPHAGOSCOPY, GASTROSCOPY, DUODENOSCOPY (EGD), COMBINED N/A 6/1/2018    Procedure: COMBINED ESOPHAGOSCOPY, GASTROSCOPY, DUODENOSCOPY (EGD), REMOVE FOREIGN BODY;  COMBINED ESOPHAGOSCOPY, GASTROSCOPY, DUODENOSCOPY with removal of foreign body, propofol sedation by anesthesia;  Surgeon: Brice Martinez MD;  Location:  GI     ESOPHAGOSCOPY, GASTROSCOPY, DUODENOSCOPY (EGD), COMBINED N/A 7/25/2018    Procedure: COMBINED ESOPHAGOSCOPY, GASTROSCOPY, DUODENOSCOPY (EGD), REMOVE FOREIGN BODY;;  Surgeon: Candy Castelan MD;  Location:  GI     ESOPHAGOSCOPY, GASTROSCOPY, DUODENOSCOPY (EGD), COMBINED N/A 7/28/2018    Procedure: COMBINED ESOPHAGOSCOPY, GASTROSCOPY, DUODENOSCOPY (EGD), REMOVE FOREIGN BODY;  COMBINED ESOPHAGOSCOPY, GASTROSCOPY, DUODENOSCOPY (EGD), REMOVE FOREIGN BODY;  Surgeon: Brice Guzmán MD;  [facility]     ESOPHAGOSCOPY, GASTROSCOPY, DUODENOSCOPY (EGD), COMBINED N/A 7/31/2018    Procedure: COMBINED ESOPHAGOSCOPY, GASTROSCOPY, DUODENOSCOPY (EGD);  COMBINED ESOPHAGOSCOPY, GASTROSCOPY, DUODENOSCOPY (EGD) TO REMOVE FOREIGN BODY;  Surgeon: Lokesh Paula MD;  [facility]     ESOPHAGOSCOPY, GASTROSCOPY, DUODENOSCOPY (EGD), COMBINED N/A 8/4/2018    Procedure: COMBINED ESOPHAGOSCOPY, GASTROSCOPY, DUODENOSCOPY (EGD), REMOVE FOREIGN BODY;   combined esophagoscopy, gastroscopy, duodenoscopy, REMOVE FOREIGN BODY ;  Surgeon: Lokesh Paula MD;  [facility]     ESOPHAGOSCOPY, GASTROSCOPY, DUODENOSCOPY (EGD), COMBINED N/A 10/6/2019    Procedure: ESOPHAGOGASTRODUODENOSCOPY (EGD) with fireign body removal x2, esophageal stent placement with floroscopy;  Surgeon: Timoteo Espana MD;  [facility]     ESOPHAGOSCOPY, GASTROSCOPY, DUODENOSCOPY (EGD), COMBINED N/A 12/2/2019    Procedure: Esophagogastroduodenoscopy with esophageal stent removal, esophogram;  Surgeon: Kailee Tena MD;   [facility]     ESOPHAGOSCOPY, GASTROSCOPY, DUODENOSCOPY (EGD), COMBINED N/A 12/17/2019    Procedure: ESOPHAGOGASTRODUODENOSCOPY, WITH FOREIGN BODY REMOVAL;  Surgeon: Pamela Perez MD;  [facility]     ESOPHAGOSCOPY, GASTROSCOPY, DUODENOSCOPY (EGD), COMBINED N/A 12/13/2019    Procedure: ESOPHAGOGASTRODUODENOSCOPY, WITH FOREIGN BODY REMOVAL;  Surgeon: Samia Stanton MD;  [facility]     ESOPHAGOSCOPY, GASTROSCOPY, DUODENOSCOPY (EGD), COMBINED N/A 12/28/2019    Procedure: ESOPHAGOGASTRODUODENOSCOPY (EGD) Removal of Foreign Body X 2;  Surgeon: Huy Kelley MD;  [facility]     ESOPHAGOSCOPY, GASTROSCOPY, DUODENOSCOPY (EGD), COMBINED N/A 1/5/2020    Procedure: ESOPHAGOGASTRODUOENOSCOPY WITH FOREIGN BODY REMOVAL;  Surgeon: Pamela Perez MD;  [facility]     ESOPHAGOSCOPY, GASTROSCOPY, DUODENOSCOPY (EGD), COMBINED N/A 1/3/2020    Procedure: ESOPHAGOGASTRODUODENOSCOPY (EGD) REMOVAL OF FOREIGN BODY.;  Surgeon: Pamela Perez MD;  [facility]     ESOPHAGOSCOPY, GASTROSCOPY, DUODENOSCOPY (EGD), COMBINED N/A 1/13/2020    Procedure: ESOPHAGOGASTRODUODENOSCOPY (EGD) for foreign body removal;  Surgeon: Lokesh Paula MD;  [facility]     ESOPHAGOSCOPY, GASTROSCOPY, DUODENOSCOPY (EGD), COMBINED N/A 1/18/2020    Procedure: Diagnostic ESOPHAGOGASTRODUODENOSCOPY (EGD;  Surgeon: Lokesh Paula MD;  [facility]     EXAM UNDER ANESTHESIA ANUS N/A 1/10/2017    Procedure: EXAM UNDER ANESTHESIA ANUS;  Surgeon: Annmarie Haynes MD;  [facility]     EXAM UNDER ANESTHESIA RECTUM N/A 7/19/2018    Procedure: EXAM UNDER ANESTHESIA RECTUM;  EXAM UNDER ANESTHESIA, REMOVAL OF RECTAL FOREIGN BODY;  Surgeon: Annmarie Haynes MD;  [facility]     HC REMOVE FECAL IMPACTION OR FB W ANESTHESIA N/A 12/18/2016    Procedure: REMOVE FECAL IMPACTION/FOREIGN BODY UNDER ANESTHESIA;  Surgeon: Soham Cano MD;  Location:  OR      KNEE SURGERY - removed a small tissue mass from knee Right      LAPAROSCOPIC ABLATION ENDOMETRIOSIS       LAPAROTOMY EXPLORATORY N/A 1/10/2017    Procedure: LAPAROTOMY EXPLORATORY;  Surgeon: Annmarie Haynes MD;  [facility]     LAPAROTOMY EXPLORATORY  09/11/2019    Manual manipulation of bowel to remove pill bottle in rectum     lymph nodes removed from neck; benign  age 7     MAMMOPLASTY REDUCTION Bilateral      RELEASE CARPAL TUNNEL Bilateral      SIGMOIDOSCOPY FLEXIBLE N/A 1/10/2017    Procedure: SIGMOIDOSCOPY FLEXIBLE;  Surgeon: Annmarie Haynes MD;  [facility]     SIGMOIDOSCOPY FLEXIBLE N/A 5/8/2018    Procedure: SIGMOIDOSCOPY FLEXIBLE;  flex sig with foreign body removal using snare and rattooth forcep;  Surgeon: Soham Cano MD;  Location: [facility]     SIGMOIDOSCOPY FLEXIBLE N/A 2/20/2019    Procedure: Exam under anesthesia Colonoscopy with attempted  removal of rectal foreign body;  Surgeon: Estrada Chávez MD;  [facility]       Family History   Problem Relation Age of Onset     Diabetes Type 2  Maternal Grandmother      Diabetes Type 2  Paternal Grandmother      Breast Cancer Paternal Grandmother      Cerebrovascular Disease Father 53     Myocardial Infarction No family hx of      Coronary Artery Disease Early Onset No family hx of      Ovarian Cancer No family hx of      Colon Cancer No family hx of        Social History     Tobacco Use     Smoking status: Never Smoker     Smokeless tobacco: Never Used   Substance Use Topics     Alcohol use: No     Alcohol/week: 0.0 standard drinks       No current facility-administered medications for this encounter.      Current Outpatient Medications   Medication     acetaminophen (TYLENOL) 32 mg/mL liquid     albuterol (PROAIR HFA) 108 (90 Base) MCG/ACT inhaler     apixaban ANTICOAGULANT (ELIQUIS) 5 MG tablet     busPIRone (BUSPAR) 10 MG tablet     Cholecalciferol (VITAMIN D) 50 MCG (2000 UT) CAPS     cloNIDine (CATAPRES) 0.1 MG  tablet     desvenlafaxine (PRISTIQ) 100 MG 24 hr tablet     docosanol (ABREVA) 10 % CREA cream     fluticasone-salmeterol (ADVAIR) 100-50 MCG/DOSE inhaler     gabapentin (NEURONTIN) 600 MG tablet     hydrOXYzine (ATARAX) 50 MG tablet     levonorgestrel (MIRENA) 20 MCG/24HR IUD     lurasidone (LATUDA) 60 MG TABS tablet     metFORMIN (GLUCOPHAGE-XR) 500 MG 24 hr tablet     ondansetron (ZOFRAN-ODT) 4 MG ODT tab     topiramate (TOPAMAX) 100 MG tablet        Allergies   Allergen Reactions     Amoxicillin-Pot Clavulanate Other (See Comments), Rash and Swelling     PN: facial swelling, left side. Also had cortisone injection the same day as she started the Augmentin.  PN: facial swelling, left side. Also had cortisone injection the same day as she started the Augmentin.  Noted in downtime recovery of chart.       Penicillins Anaphylaxis     Vancomycin Swelling, Itching and Rash     Other reaction(s): Redness  Flushed face, very itchy; HUT Comment: Flushed face, very itchy; HUT Reaction: Angioedema; HUT Reaction: Redness; HUT Severity: Med; HUT Noted: [id number]  Flushed face, very itchy  Flushed face, very itchy  Flushed face, very itchy       Hydrocodone Nausea and Vomiting and GI Disturbance     vomiting for days  vomiting for days  vomiting for days  vomiting for days, PN: vomiting for days  vomiting for days  vomiting for days  vomiting for days  vomiting for days  vomiting for days  vomiting for days, PN: vomiting for days  vomiting for days  vomiting for days  vomiting for days  vomiting for days  ; HUT Comment: vomiting for days; HUT Reaction: Gastrointestinal; HUT Reaction: Nausea And Vomiting; HUT Reaction Type: Intolerance; HUT Severity: Med; HUT Noted: [id number]  vomiting for days       Blood-Group Specific Substance Other (See Comments)     Patient has an anti-Cw and non-specific antibodies. Blood product orders may be delayed. Draw one red top and two purple top tubes for all type/screen/crossmatch orders.      Influenza Vaccines Other (See Comments)     Oseltamivir Hives     med stopped, PN: med stopped     Cephalosporins Rash     Lamotrigine Rash     Possibly associated with Lamictal.   HUT Comment: Possibly associated with Lamictal. ; HUT Reaction: Rash; HUT Reaction Type: Allergy; HUT Severity: Low; HUT Noted: [id number]     Latex Rash      Review of Systems   Constitutional: Negative for fever.   Respiratory: Positive for cough and chest tightness.    Cardiovascular: Positive for chest pain.     Physical Exam   BP: 124/76  Pulse: 77  Heart Rate: 91  Temp: 98.7  F (37.1  C)  Resp: 18  SpO2: 99 %    Physical Exam  Constitutional:       General: She is not in acute distress.     Appearance: She is well-developed. She is not ill-appearing, toxic-appearing or diaphoretic.      Comments: Comfortably resting, lying in bed, NAD, nondiaphoretic, lucid, fully conversant, no  respiratory distress, alert and oriented.  Patient is playing on her phone when we into the room   HENT:      Head: Normocephalic and atraumatic.      Mouth/Throat:      Pharynx: No oropharyngeal exudate.   Eyes:      General: No scleral icterus.     Pupils: Pupils are equal, round, and reactive to light.   Neck:      Musculoskeletal: Normal range of motion and neck supple.   Cardiovascular:      Rate and Rhythm: Normal rate.      Heart sounds: Normal heart sounds.   Pulmonary:      Effort: Pulmonary effort is normal. No respiratory distress.      Breath sounds: Normal breath sounds.   Abdominal:      General: Bowel sounds are normal.      Palpations: Abdomen is soft.      Tenderness: There is no abdominal tenderness.   Musculoskeletal:         General: No tenderness.   Skin:     General: Skin is warm and dry.      Coloration: Skin is not pale.      Findings: No erythema or rash.   Neurological:      Mental Status: She is alert and oriented to person, place, and time.         ED Course   12:05 AM  The patient was seen and examined by Dr. Velasquez in Room 17.        Procedures             EKG Interpretation:      Interpreted by Jeffrey Velasquez MD  Time reviewed: 2333  Symptoms at time of EKG: chest pain   Rhythm: normal sinus   Rate: normal  Axis: normal  Ectopy: none  Conduction: normal  ST Segments/ T Waves: No ST-T wave changes  Q Waves: none  Comparison to prior: Unchanged    Clinical Impression: abnormal EKG: cannot rule out anterior infarct      Results for orders placed or performed during the hospital encounter of 02/15/20   XR Chest 2 Views     Status: None    Narrative    EXAM: XR CHEST 2 VW  LOCATION: [facility]  DATE/TIME: 2/16/2020 12:33 AM    INDICATION: Chest pain  COMPARISON: 12/31/2019      Impression    IMPRESSION: Right-sided port with tip over atrial caval junction. Elevated right hemidiaphragm. No pneumothorax or pleural effusion. No acute osseous abnormality.   CBC with platelets differential     Status: Abnormal   Result Value Ref Range    WBC 8.1 4.0 - 11.0 10e9/L    RBC Count 4.11 3.8 - 5.2 10e12/L    Hemoglobin 11.5 (L) 11.7 - 15.7 g/dL    Hematocrit 36.6 35.0 - 47.0 %    MCV 89 78 - 100 fl    MCH 28.0 26.5 - 33.0 pg    MCHC 31.4 (L) 31.5 - 36.5 g/dL    RDW 13.5 10.0 - 15.0 %    Platelet Count 283 150 - 450 10e9/L    Diff Method Automated Method     % Neutrophils 57.4 %    % Lymphocytes 31.6 %    % Monocytes 7.6 %    % Eosinophils 2.5 %    % Basophils 0.5 %    % Immature Granulocytes 0.4 %    Nucleated RBCs 0 0 /100    Absolute Neutrophil 4.6 1.6 - 8.3 10e9/L    Absolute Lymphocytes 2.5 0.8 - 5.3 10e9/L    Absolute Monocytes 0.6 0.0 - 1.3 10e9/L    Absolute Eosinophils 0.2 0.0 - 0.7 10e9/L    Absolute Basophils 0.0 0.0 - 0.2 10e9/L    Abs Immature Granulocytes 0.0 0 - 0.4 10e9/L    Absolute Nucleated RBC 0.0    D dimer quantitative     Status: None   Result Value Ref Range    D Dimer 0.5 0.0 - 0.50 ug/ml FEU   Comprehensive metabolic panel     Status: Abnormal   Result Value Ref Range    Sodium 141 133 - 144 mmol/L    Potassium 3.8 3.4 -  5.3 mmol/L    Chloride 112 (H) 94 - 109 mmol/L    Carbon Dioxide 21 20 - 32 mmol/L    Anion Gap 8 3 - 14 mmol/L    Glucose 120 (H) 70 - 99 mg/dL    Urea Nitrogen 12 7 - 30 mg/dL    Creatinine 0.58 0.52 - 1.04 mg/dL    GFR Estimate >90 >60 mL/min/[1.73_m2]    GFR Estimate If Black >90 >60 mL/min/[1.73_m2]    Calcium 9.0 8.5 - 10.1 mg/dL    Bilirubin Total 0.2 0.2 - 1.3 mg/dL    Albumin 3.1 (L) 3.4 - 5.0 g/dL    Protein Total 7.3 6.8 - 8.8 g/dL    Alkaline Phosphatase 95 40 - 150 U/L    ALT 20 0 - 50 U/L    AST 21 0 - 45 U/L   Troponin I     Status: None   Result Value Ref Range    Troponin I ES <0.015 0.000 - 0.045 ug/L   Lipase     Status: None   Result Value Ref Range    Lipase 146 73 - 393 U/L   TSH with free T4 reflex     Status: Abnormal   Result Value Ref Range    TSH 4.56 (H) 0.40 - 4.00 mU/L   T4 free     Status: None   Result Value Ref Range    T4 Free 0.78 0.76 - 1.46 ng/dL   EKG 12-lead, tracing only     Status: None   Result Value Ref Range    Interpretation ECG Click View Image link to view waveform and result      I have reviewed the patient's prior chart including recent labs, ER visits, and available inpatient discharge summaries if available.                    Labs Ordered and Resulted from Time of ED Arrival Up to the Time of Departure from the ED   CBC WITH PLATELETS DIFFERENTIAL - Abnormal; Notable for the following components:       Result Value    Hemoglobin 11.5 (*)     MCHC 31.4 (*)     All other components within normal limits   COMPREHENSIVE METABOLIC PANEL - Abnormal; Notable for the following components:    Chloride 112 (*)     Glucose 120 (*)     Albumin 3.1 (*)     All other components within normal limits   TSH WITH FREE T4 REFLEX - Abnormal; Notable for the following components:    TSH 4.56 (*)     All other components within normal limits   D DIMER QUANTITATIVE   TROPONIN I   LIPASE   T4 FREE   T4 FREE            Assessments & Plan (with Medical Decision Making)   This is a  "29-year-old female patient well-known to the emergency room who is presenting today with chest pain.  Patient states that she was sitting on a couch today when she had sudden onset of burning in her chest and having difficulty with taking a deep breath.  On physical exam she is in no distress.  She speaking in full sentences and when we enter the room she is playing on her phone.  She is otherwise vitally stable and afebrile.  Her lungs are otherwise clear heart tones are within normal limits.  EKG shows normal sinus rhythm without signs of ectopy or ischemia.  Her laboratory results are unremarkable including a negative troponin.  At this time I believe that most of her symptoms are associated with anxiety.  Her d-dimer was otherwise negative which was a significant concern for her as she has had pulmonary embolisms in the past however this was negative.  I believe she can be safely discharged and follow-up with her clinic for further care management.    Pt was discharged home/self-care.  PT was provided written discharge instructions. Additionally verbal instructions were given and discussed with patient.  PT was asked to return to the ED immediately for any new or concerning symptoms.  Pt was in agreement, endorsed understanding, and questions were answered.     I have reviewed the nursing notes.    I have reviewed the findings, diagnosis, plan and need for follow up with the patient.    Discharge Medication List as of 2/16/2020  1:06 AM        Final diagnoses:   Chest pressure   ICampbell, am serving as a trained medical scribe to document services personally performed by Jeffy Velasquez MD, based on the provider's statements to me.   IJeffy MD, was physically present and have reviewed and verified the accuracy of this note documented by Campbell Box.     \"This dictation was performed with the assistance of voice recognition software and may contain inadvertant transcription  " "errors,  omissions and/or  inadvertent word substitution.\" --Jeffy Velasquez MD     2/15/2020   [facility], [place], EMERGENCY DEPARTMENT     Jeffy Velasquez MD  02/16/20 2222    "

## 2020-02-28 ENCOUNTER — NURSE TRIAGE (OUTPATIENT)
Dept: NURSING | Facility: CLINIC | Age: 30
End: 2020-02-28

## 2020-02-29 NOTE — TELEPHONE ENCOUNTER
"\"I had my IUD in for 6 years. I got my period right away and I have been bleeding for a week and a half. I also have some cramping. I am changing my pad 3-4 times/day.Is this normal?\" Denies sx. Triaged , gave home care advice and to follow up with OB as needed.  Nel Soria RN [facility]        Additional Information    Negative: [1] Caller has NON-URGENT question AND [2] triager unable to answer question    Negative: [1] MILD abdominal pain (e.g., does not interfere with normal activities) AND [2] pain comes and goes (cramps) AND [3] present > 48 hours    Negative: [1] Caller has URGENT question AND [2] triager unable to answer question    Negative: MODERATE vaginal bleeding (i.e., soaking 1 pad or tampon per hour and present > 6 hours)    Negative: [1] Constant abdominal pain AND [2] present > 2 hours    Negative: SEVERE vaginal bleeding (i.e., soaking 2 pads or tampons per hour and present 2 or more hours)    Negative: Patient sounds very sick or weak to the triager    Negative: [1] SEVERE abdominal pain (e.g., excruciating) AND [2] present > 1 hour    Negative: Pregnant    Negative: [1] Periods with > 6 soaked pads or tampons per day AND [2] last > 7 days    Negative: Worried that IUD is not in right place (e.g., not able to feel the IUD string, string longer than usual, can feel hard plastic of IUD)    Negative: IUD came out (e.g., has IUD in her hand)    Negative: Bad smelling vaginal discharge    Negative: Abnormal color vaginal discharge (i.e., yellow, green, gray)    Negative: Periods with > 6 soaked pads or tampons per day    Negative: Periods last > 7 days    Negative: [1] Periods are WORSE (more bleeding or pain) since IUD was placed AND [2] more than 3 months (3 menstrual cycles) since IUD was placed    Negative: [1] Bleeding or spotting between periods since IUD was placed AND [2]  more than 3 months (3 menstrual cycles) since IUD was placed    Negative: [1] Has a 3-year hormonal " IUD (e.g., Jaydess, Liletta, Sklya) AND [2] more than 3 years since insertion    Negative: [1] Has 5-year hormonal IUD (e.g., Kyleena, LNG-IUS, Mirena, Corinne) AND [2] more than 5 years since insertion    Negative: [1] Has copper IUD (e.g., ParaGard) AND [2] more than 10 years since insertion    Negative: Wants to get IUD removed    [1] Periods are WORSE (more bleeding or pain) since IUD was placed AND [2]  3 or less months (3 menstrual cycles) since IUD was placed    Protocols used: CONTRACEPTION - IUD SYMPTOMS AND [id number]

## 2020-03-03 ENCOUNTER — NURSE TRIAGE (OUTPATIENT)
Dept: NURSING | Facility: CLINIC | Age: 30
End: 2020-03-03

## 2020-03-04 NOTE — TELEPHONE ENCOUNTER
"\"I was walking earlier and rolled my ankle(left) and fell. It's swollen and the pain is a 4/10 sitting 7/10 while walking, but can walk on it.. \" denies other sx. Triaged, gave home care advice and to follow up with PCP if needed.  Nel Soria RN [facility]        Additional Information    Negative: SEVERE pain (e.g., excruciating)    Negative: A 'snap' or 'pop' was heard at the time of injury    Negative: Large swelling or bruise and size > palm of person's hand    Negative: Patient wants to be seen    Negative: Can't stand (bear weight) or walk (e.g., 4 steps)    Negative: Skin is split open or gaping (length > 1/2 inch or 12 mm)    Negative: Bleeding won't stop after 10 minutes of direct pressure (using correct technique)    Negative: Dirt in the wound and not removed after 15 minutes of scrubbing    Negative: Numbness (new loss of sensation) of toe(s)    Negative: Looks infected (e.g., spreading redness, pus, red streak)    Negative: Sounds like a serious injury to the triager    Negative: Has diabetes (diabetes mellitus) and any bruising or wound    Negative: High-risk adult (e.g., age > 60, osteoporosis, chronic steroid use)    Negative: Suspicious history for the injury    Negative: Wound and no tetanus booster in > 5 years (Or greater than 10 years for clean cuts)    Negative: Injury and pain has not improved after 3 days    Negative: Injury is still painful or swollen after 2 weeks    Minor ankle or foot injury    Protocols used: ANKLE AND FOOT INJURY-A-[place]      "

## 2020-03-16 ENCOUNTER — HOSPITAL ENCOUNTER (EMERGENCY)
Facility: CLINIC | Age: 30
Discharge: HOME | End: 2020-03-16
Attending: EMERGENCY MEDICINE | Admitting: EMERGENCY MEDICINE
Payer: COMMERCIAL

## 2020-03-16 ENCOUNTER — APPOINTMENT (OUTPATIENT)
Dept: GENERAL RADIOLOGY | Facility: CLINIC | Age: 30
End: 2020-03-16
Attending: EMERGENCY MEDICINE
Payer: COMMERCIAL

## 2020-03-16 ENCOUNTER — NURSE TRIAGE (OUTPATIENT)
Dept: NURSING | Facility: CLINIC | Age: 30
End: 2020-03-16

## 2020-03-16 VITALS
HEART RATE: 92 BPM | TEMPERATURE: 98.5 F | RESPIRATION RATE: 14 BRPM | DIASTOLIC BLOOD PRESSURE: 94 MMHG | BODY MASS INDEX: 46.93 KG/M2 | OXYGEN SATURATION: 95 % | HEIGHT: 62 IN | WEIGHT: 255 LBS | SYSTOLIC BLOOD PRESSURE: 144 MMHG

## 2020-03-16 DIAGNOSIS — S93.401A SPRAIN OF RIGHT ANKLE, UNSPECIFIED LIGAMENT, INITIAL ENCOUNTER: ICD-10-CM

## 2020-03-16 DIAGNOSIS — L30.0 NUMMULAR ECZEMA: ICD-10-CM

## 2020-03-16 PROCEDURE — 99283 EMERGENCY DEPT VISIT LOW MDM: CPT | Mod: Z6 | Performed by: EMERGENCY MEDICINE

## 2020-03-16 PROCEDURE — 99283 EMERGENCY DEPT VISIT LOW MDM: CPT | Performed by: EMERGENCY MEDICINE

## 2020-03-16 PROCEDURE — 73590 X-RAY EXAM OF LOWER LEG: CPT | Mod: RT

## 2020-03-16 RX ORDER — BENZOCAINE/MENTHOL 6 MG-10 MG
LOZENGE MUCOUS MEMBRANE
Qty: 60 G | Refills: 0 | Status: ON HOLD | OUTPATIENT
Start: 2020-03-16 | End: 2020-03-30

## 2020-03-16 ASSESSMENT — MIFFLIN-ST. JEOR: SCORE: 1839.92

## 2020-03-16 NOTE — ED AVS SNAPSHOT
[facility], [place], Emergency Department  [address] [place] [id number]  Phone:  [phone]                                    Nevin Alvarado   MRN: [id number]    Department:  [facility], Emergency Department   Date of Visit:  3/16/2020           After Visit Summary Signature Page    I have received my discharge instructions, and my questions have been answered. I have discussed any challenges I see with this plan with the nurse or doctor.    ..........................................................................................................................................  Patient/Patient Representative Signature      ..........................................................................................................................................  Patient Representative Print Name and Relationship to Patient    ..................................................               ................................................  Date                                   Time    ..........................................................................................................................................  Reviewed by Signature/Title    ...................................................              ..............................................  Date                                               Time          22EPIC Rev 08/18

## 2020-03-16 NOTE — LETTER
March 16, 2020      To Whom It May Concern:      Nevin Alvarado was seen in our Emergency Department today, 03/16/20.  I expect her condition to improve over the next week.  She may return to work/school on 3/18/2020 but will be on crutches until 3/22/2020.    Sincerely,        Bruce Pryor MD, MD

## 2020-03-16 NOTE — ED NOTES
Initial Assessment: VSS. Communicates needs without difficulty. Pleasant and co-op. Denies SOB. Denies chest/shoulder/arm pain or discomfort. No acute distress noted. Pt has c/o right foot/ankle pain r/t 'tripping over her own feet' and stumbling down 4 steps. Pt reports hearing a 'pop' in her ankle. MD aware.

## 2020-03-16 NOTE — TELEPHONE ENCOUNTER
"Nevin is having pain in foot right the bottom up by toes palm area when moving foot can feel a pinching on foot.  Pain is sharp.  Pain is shooting up to knee.  When walking pain is present.  Pain is currently a \"6\".  Nevin deals also with neuropathy.  Foot is not swollen and denies fever.      Reason for Disposition    [1] SEVERE pain (e.g., excruciating, unable to do any normal activities) AND [2] not improved after 2 hours of pain medicine    Additional Information    Negative: Entire foot is cool or blue in comparison to other foot    Negative: Purple or black skin on foot or toe    Negative: [1] Red area or streak AND [2] fever    Negative: [1] Swollen foot AND [2] fever    Negative: Patient sounds very sick or weak to the triager    Protocols used: FOOT PAIN-A-AH      "

## 2020-03-16 NOTE — ED TRIAGE NOTES
Pt brought in by ambulance after falling four steps. [place] snap on right ankle. Numb up to knee.

## 2020-03-17 NOTE — DISCHARGE INSTRUCTIONS
Use crutches over the next 2 days.  Toe-touch as tolerated and increase weightbearing as tolerated over the next 4 to 5 days.    Please make an appointment to follow up with Your Primary Care Provider in 5-7 days for recheck.

## 2020-03-17 NOTE — ED PROVIDER NOTES
History     Chief Complaint   Patient presents with     Fall     Ankle Pain     right     HPI  Nevin Alvarado is a 29 year old female with an ED care plan because of her self-harm history who presents to the ER with complaints of falling and twisting her right ankle.  Patient states that she heard a pop and states that now she cannot feel anything below her right knee.  Patient states she has been unable to ambulate on her right leg since that time.  Patient denies any hip pain denies any back pain denies any neck pain.    Patient also complains of a rash on her lower extremities bilaterally and starting on her upper extremities as well.  Patient states the rash is not pruritic in nature.  Patient states the rash is slightly scaly.    I have reviewed the Medications, Allergies, Past Medical and Surgical History, and Social History in the [employer] system.    Past Medical History:   Diagnosis Date     ADD (attention deficit disorder)      Anorexia nervosa with bulimia     history of; on Topamax     Anxiety      Borderline personality disorder (H)      Depression      Depressive disorder      H/O self-harm      Lives in independent group home     due to debilitating mental illness     Migraine without aura     no known triggers; on Topamax bid and Imitrex PRN     Morbid obesity (H)      PTSD (post-traumatic stress disorder)      Rectal foreign body - Recurrent issue, self placed      Swallowed foreign body - Recurrent issue, self placed        Past Surgical History:   Procedure Laterality Date     COMBINED ESOPHAGOSCOPY, GASTROSCOPY, DUODENOSCOPY (EGD), REPLACE ESOPHAGEAL STENT N/A 10/9/2019    Procedure: Upper Endoscopy with Suture Placement;  Surgeon: Zurdo Ramirez MD;  Location:  OR     ESOPHAGOSCOPY, GASTROSCOPY, DUODENOSCOPY (EGD), COMBINED N/A 3/9/2017    Procedure: COMBINED ESOPHAGOSCOPY, GASTROSCOPY, DUODENOSCOPY (EGD), REMOVE FOREIGN BODY;  Surgeon: Avis Guzmán MD;  Location:   OR     ESOPHAGOSCOPY, GASTROSCOPY, DUODENOSCOPY (EGD), COMBINED N/A 4/20/2017    Procedure: COMBINED ESOPHAGOSCOPY, GASTROSCOPY, DUODENOSCOPY (EGD), REMOVE FOREIGN BODY;  EGD removal [place] body;  Surgeon: Lokesh Paula MD;  Location: [facility]     ESOPHAGOSCOPY, GASTROSCOPY, DUODENOSCOPY (EGD), COMBINED N/A 6/12/2017    Procedure: COMBINED ESOPHAGOSCOPY, GASTROSCOPY, DUODENOSCOPY (EGD);  COMBINED ESOPHAGOSCOPY, GASTROSCOPY, DUODENOSCOPY (EGD) [[id number]]attempted removal of foreign body;  Surgeon: Pamela Perez MD;  Location: [facility]     ESOPHAGOSCOPY, GASTROSCOPY, DUODENOSCOPY (EGD), COMBINED N/A 6/9/2017    Procedure: COMBINED ESOPHAGOSCOPY, GASTROSCOPY, DUODENOSCOPY (EGD), REMOVE FOREIGN BODY;  Esophagoscopy, Gastroscopy, Duodenoscopy, Removal of Foreign Body;  Surgeon: Dejon Marsh MD;  Location: [facility]     ESOPHAGOSCOPY, GASTROSCOPY, DUODENOSCOPY (EGD), COMBINED N/A 1/6/2018    Procedure: COMBINED ESOPHAGOSCOPY, GASTROSCOPY, DUODENOSCOPY (EGD), REMOVE FOREIGN BODY;  COMBINED ESOPHAGOSCOPY, GASTROSCOPY, DUODENOSCOPY (EGD) [by pascal net and snare with profol sedation;  Surgeon: Feliciano Emmanuel MD;  Location:  GI     ESOPHAGOSCOPY, GASTROSCOPY, DUODENOSCOPY (EGD), COMBINED N/A 3/19/2018    Procedure: COMBINED ESOPHAGOSCOPY, GASTROSCOPY, DUODENOSCOPY (EGD), REMOVE FOREIGN BODY;   Esophagodscopy, Gastroscopy, Duodenoscopy,Foreign Body Removal;  Surgeon: Brice Guzmán MD;  Location: [facility]     ESOPHAGOSCOPY, GASTROSCOPY, DUODENOSCOPY (EGD), COMBINED N/A 4/16/2018    Procedure: COMBINED ESOPHAGOSCOPY, GASTROSCOPY, DUODENOSCOPY (EGD), REMOVE FOREIGN BODY;  Esophagogastroduodenoscopy  Foreign Body Removal X 2;  Surgeon: Royer Moise MD;  Location: [facility]     ESOPHAGOSCOPY, GASTROSCOPY, DUODENOSCOPY (EGD), COMBINED N/A 6/1/2018    Procedure: COMBINED ESOPHAGOSCOPY, GASTROSCOPY, DUODENOSCOPY (EGD), REMOVE FOREIGN BODY;  COMBINED ESOPHAGOSCOPY, GASTROSCOPY, DUODENOSCOPY  with removal of foreign body, propofol sedation by anesthesia;  Surgeon: Brice Martinez MD;  Location:  GI     ESOPHAGOSCOPY, GASTROSCOPY, DUODENOSCOPY (EGD), COMBINED N/A 7/25/2018    Procedure: COMBINED ESOPHAGOSCOPY, GASTROSCOPY, DUODENOSCOPY (EGD), REMOVE FOREIGN BODY;;  Surgeon: Candy Castelan MD;  Location:  GI     ESOPHAGOSCOPY, GASTROSCOPY, DUODENOSCOPY (EGD), COMBINED N/A 7/28/2018    Procedure: COMBINED ESOPHAGOSCOPY, GASTROSCOPY, DUODENOSCOPY (EGD), REMOVE FOREIGN BODY;  COMBINED ESOPHAGOSCOPY, GASTROSCOPY, DUODENOSCOPY (EGD), REMOVE FOREIGN BODY;  Surgeon: Brice Guzmán MD;  [facility]     ESOPHAGOSCOPY, GASTROSCOPY, DUODENOSCOPY (EGD), COMBINED N/A 7/31/2018    Procedure: COMBINED ESOPHAGOSCOPY, GASTROSCOPY, DUODENOSCOPY (EGD);  COMBINED ESOPHAGOSCOPY, GASTROSCOPY, DUODENOSCOPY (EGD) TO REMOVE FOREIGN BODY;  Surgeon: Lokesh Paula MD;  [facility]     ESOPHAGOSCOPY, GASTROSCOPY, DUODENOSCOPY (EGD), COMBINED N/A 8/4/2018    Procedure: COMBINED ESOPHAGOSCOPY, GASTROSCOPY, DUODENOSCOPY (EGD), REMOVE FOREIGN BODY;   combined esophagoscopy, gastroscopy, duodenoscopy, REMOVE FOREIGN BODY ;  Surgeon: Lokesh Paula MD;  [facility]: [facility]     ESOPHAGOSCOPY, GASTROSCOPY, DUODENOSCOPY (EGD), COMBINED N/A 10/6/2019    Procedure: ESOPHAGOGASTRODUODENOSCOPY (EGD) with fireign body removal x2, esophageal stent placement with floroscopy;  Surgeon: Timoteo Espana MD;  Location: [facility]     ESOPHAGOSCOPY, GASTROSCOPY, DUODENOSCOPY (EGD), COMBINED N/A 12/2/2019    Procedure: Esophagogastroduodenoscopy with esophageal stent removal, esophogram;  Surgeon: Kailee Tena MD;  Location: [facility]     ESOPHAGOSCOPY, GASTROSCOPY, DUODENOSCOPY (EGD), COMBINED N/A 12/17/2019    Procedure: ESOPHAGOGASTRODUODENOSCOPY, WITH FOREIGN BODY REMOVAL;  Surgeon: Pamela Perez MD;  [facility]     ESOPHAGOSCOPY, GASTROSCOPY, DUODENOSCOPY (EGD), COMBINED N/A 12/13/2019     Procedure: ESOPHAGOGASTRODUODENOSCOPY, WITH FOREIGN BODY REMOVAL;  Surgeon: Samia Stanton MD;  [facility]     ESOPHAGOSCOPY, GASTROSCOPY, DUODENOSCOPY (EGD), COMBINED N/A 12/28/2019    Procedure: ESOPHAGOGASTRODUODENOSCOPY (EGD) Removal of Foreign Body X 2;  Surgeon: Huy Kelley MD;  [facility]     ESOPHAGOSCOPY, GASTROSCOPY, DUODENOSCOPY (EGD), COMBINED N/A 1/5/2020    Procedure: ESOPHAGOGASTRODUOENOSCOPY WITH FOREIGN BODY REMOVAL;  Surgeon: Pamela Perez MD;  [facility]     ESOPHAGOSCOPY, GASTROSCOPY, DUODENOSCOPY (EGD), COMBINED N/A 1/3/2020    Procedure: ESOPHAGOGASTRODUODENOSCOPY (EGD) REMOVAL OF FOREIGN BODY.;  Surgeon: Pamela Perez MD;  [facility]     ESOPHAGOSCOPY, GASTROSCOPY, DUODENOSCOPY (EGD), COMBINED N/A 1/13/2020    Procedure: ESOPHAGOGASTRODUODENOSCOPY (EGD) for foreign body removal;  Surgeon: Lokesh Paula MD;  [facility]     ESOPHAGOSCOPY, GASTROSCOPY, DUODENOSCOPY (EGD), COMBINED N/A 1/18/2020    Procedure: Diagnostic ESOPHAGOGASTRODUODENOSCOPY (EGD;  Surgeon: Lokesh Paula MD;  [facility]     EXAM UNDER ANESTHESIA ANUS N/A 1/10/2017    Procedure: EXAM UNDER ANESTHESIA ANUS;  Surgeon: Annmarie Haynes MD;  [facility]     EXAM UNDER ANESTHESIA RECTUM N/A 7/19/2018    Procedure: EXAM UNDER ANESTHESIA RECTUM;  EXAM UNDER ANESTHESIA, REMOVAL OF RECTAL FOREIGN BODY;  Surgeon: Annmarie Haynes MD;  Location: [facility]     HC REMOVE FECAL IMPACTION OR FB W ANESTHESIA N/A 12/18/2016    Procedure: REMOVE FECAL IMPACTION/FOREIGN BODY UNDER ANESTHESIA;  Surgeon: Soham Cano MD;  Location: [facility] OR     KNEE SURGERY - removed a small tissue mass from knee Right      LAPAROSCOPIC ABLATION ENDOMETRIOSIS       LAPAROTOMY EXPLORATORY N/A 1/10/2017    Procedure: LAPAROTOMY EXPLORATORY;  Surgeon: Annmarie Haynes MD;  [facility]: [facility]     LAPAROTOMY EXPLORATORY  09/11/2019    Manual manipulation  of bowel to remove pill bottle in rectum     lymph nodes removed from neck; benign  age 7     MAMMOPLASTY REDUCTION Bilateral      RELEASE CARPAL TUNNEL Bilateral      SIGMOIDOSCOPY FLEXIBLE N/A 1/10/2017    Procedure: SIGMOIDOSCOPY FLEXIBLE;  Surgeon: Annmarie Haynes MD;  [facility]: [facility]     SIGMOIDOSCOPY FLEXIBLE N/A 5/8/2018    Procedure: SIGMOIDOSCOPY FLEXIBLE;  flex sig with foreign body removal using snare and rattooth forcep;  Surgeon: Soham Cano MD;  Location:  GI     SIGMOIDOSCOPY FLEXIBLE N/A 2/20/2019    Procedure: Exam under anesthesia Colonoscopy with attempted  removal of rectal foreign body;  Surgeon: Estrada Chávez MD;  Location: [facility]           Dose / Directions   acetaminophen 32 mg/mL liquid  Commonly known as:  TYLENOL  Used for:  Esophageal dysphagia, Hx of foreign body ingestion      Dose:  1,000 mg  Take 31.25 mLs (1,000 mg) by mouth every 6 hours as needed for fever or pain  Quantity:  355 mL  Refills:  0     apixaban ANTICOAGULANT 5 MG tablet  Commonly known as:  ELIQUIS  Used for:  Acute pulmonary embolism without acute cor pulmonale, unspecified pulmonary embolism type (H)      Dose:  5 mg  Take 1 tablet (5 mg) by mouth 2 times daily  Quantity:  60 tablet  Refills:  0     busPIRone 10 MG tablet  Commonly known as:  BUSPAR      Take 1 tablet (10 mg) by mouth twice daily  Refills:  0     cloNIDine 0.1 MG tablet  Commonly known as:  CATAPRES      Dose:  0.1 mg  Take 0.1 mg by mouth 2 times daily  Refills:  0     docosanol 10 % Crea cream  Commonly known as:  ABREVA      Apply to lip 5 times a day as soon as symptoms begin, do not use for more than 10 days. Used PRN.  Refills:  0     fluticasone-salmeterol 100-50 MCG/DOSE inhaler  Commonly known as:  ADVAIR      Dose:  1 puff  Inhale 1 puff into the lungs 2 times daily  Refills:  0     gabapentin 600 MG tablet  Commonly known as:  NEURONTIN      Dose:  600 mg  Take 600 mg by mouth 3 times daily  Refills:  0     hydrOXYzine  50 MG tablet  Commonly known as:  ATARAX  Used for:  History of posttraumatic stress disorder (PTSD)      Dose:  50 mg  Take 1 tablet (50 mg) by mouth 3 times daily as needed for anxiety  Quantity:  30 tablet  Refills:  0     Latuda 60 MG Tabs tablet  Generic drug:  lurasidone      Take 1 tablet (60 mg) by mouth at bedtime  Refills:  0     levonorgestrel 20 MCG/24HR IUD  Commonly known as:  MIRENA      Dose:  1 each  1 each by Intrauterine route once  Refills:  0     metFORMIN 500 MG 24 hr tablet  Commonly known as:  GLUCOPHAGE-XR      Take 1 tablet (500 mg) by mouth daily  Refills:  0     ondansetron 4 MG ODT tab  Commonly known as:  ZOFRAN-ODT      Dose:  4 mg  Take 4 mg by mouth every 6 hours as needed for nausea or vomiting  Refills:  0     Pristiq 100 MG 24 hr tablet  Generic drug:  desvenlafaxine      Take 1 tablet (100 mg) by mouth every morning  Refills:  0     ProAir  (90 Base) MCG/ACT inhaler  Generic drug:  albuterol      Dose:  2 puff  Inhale 2 puffs into the lungs 4 times daily as needed  Refills:  0     topiramate 100 MG tablet  Commonly known as:  TOPAMAX      Take 1 tablet (100 mg) by mouth daily at bedtime  Refills:  0     vitamin D 50 MCG (2000 UT) Caps      Dose:  2,000 Units  Take 2,000 Units by mouth daily  Refills:  0             Family History   Problem Relation Age of Onset     Diabetes Type 2  Maternal Grandmother      Diabetes Type 2  Paternal Grandmother      Breast Cancer Paternal Grandmother      Cerebrovascular Disease Father 53     Myocardial Infarction No family hx of      Coronary Artery Disease Early Onset No family hx of      Ovarian Cancer No family hx of      Colon Cancer No family hx of        Social History     Tobacco Use     Smoking status: Never Smoker     Smokeless tobacco: Never Used   Substance Use Topics     Alcohol use: No     Alcohol/week: 0.0 standard drinks       Allergies   Allergen Reactions     Amoxicillin-Pot Clavulanate Other (See Comments), Rash and  "Swelling     PN: facial swelling, left side. Also had cortisone injection the same day as she started the Augmentin.  PN: facial swelling, left side. Also had cortisone injection the same day as she started the Augmentin.  Noted in downtime recovery of chart.       Penicillins Anaphylaxis     Vancomycin Swelling, Itching and Rash     Other reaction(s): Redness  Flushed face, very itchy; HUT Comment: Flushed face, very itchy; HUT Reaction: Angioedema; HUT Reaction: Redness; HUT Severity: Med; HUT Noted: [id number]  Flushed face, very itchy  Flushed face, very itchy  Flushed face, very itchy       Hydrocodone Nausea and Vomiting and GI Disturbance     vomiting for days  vomiting for days  vomiting for days  vomiting for days, PN: vomiting for days  vomiting for days  vomiting for days  vomiting for days  vomiting for days  vomiting for days  vomiting for days, PN: vomiting for days  vomiting for days  vomiting for days  vomiting for days  vomiting for days  ; HUT Comment: vomiting for days; HUT Reaction: Gastrointestinal; HUT Reaction: Nausea And Vomiting; HUT Reaction Type: Intolerance; HUT Severity: Med; HUT Noted: [id number]  vomiting for days       Blood-Group Specific Substance Other (See Comments)     Patient has an anti-Cw and non-specific antibodies. Blood product orders may be delayed. Draw one red top and two purple top tubes for all type/screen/crossmatch orders.     Influenza Vaccines Other (See Comments)     Oseltamivir Hives     med stopped, PN: med stopped     Cephalosporins Rash     Lamotrigine Rash     Possibly associated with Lamictal.   HUT Comment: Possibly associated with Lamictal. ; HUT Reaction: Rash; HUT Reaction Type: Allergy; HUT Severity: Low; HUT Noted: 20180307     Latex Rash       Review of Systems   All other systems reviewed and are negative.      Physical Exam   BP: (!) 164/101  Heart Rate: 99  Temp: 98.5  F (36.9  C)  Resp: 12  Height: 157.5 cm (5' 2\")  Weight: 115.7 kg (255 lb)  SpO2: " 100 %      Physical Exam  Vitals signs and nursing note reviewed.   HENT:      Head: Atraumatic.   Eyes:      Extraocular Movements: Extraocular movements intact.      Pupils: Pupils are equal, round, and reactive to light.   Neck:      Musculoskeletal: Neck supple.   Pulmonary:      Effort: No respiratory distress.   Musculoskeletal:      Comments: Patient has no gross deformity swelling or tenderness.  Patient states she cannot feel anything below her right knee which I have trouble believing.  There is no instability by exam of the knee, ankle or any bony tenderness.    Distal tissue is perfusing normally.   Skin:     General: Skin is warm.      Comments: Patient does have a eczematous rash of her extremities more prominent on the right lower extremity but is bilateral and on all 4 extremities.  The rash is pigmented dark and scaly.  Maculopapular in nature.   Neurological:      General: No focal deficit present.      Mental Status: She is alert and oriented to person, place, and time.   Psychiatric:      Comments: Slightly strange affect         ED Course        Procedures          Results for orders placed or performed during the hospital encounter of 03/16/20 (from the past 24 hour(s))   XR Tibia & Fibula Port Right 2 Views    Narrative    EXAM: XR TIBIA AND FIBULA PORT RT 2 VW  LOCATION: [facility]  DATE/TIME: 3/16/2020 6:35 PM    INDICATION: Right lower leg pain after falling.  COMPARISON: None.      Impression    IMPRESSION: Normal tibia and fibula.              Assessments & Plan (with Medical Decision Making)     I have reviewed the nursing notes.    Patient refused a gel cast ankle brace but accepted crutches.    I have reviewed the findings, diagnosis, plan and need for follow up with the patient.    New Prescriptions    HYDROCORTISONE (CORTAID) 1 % EXTERNAL CREAM    Apply sparingly to rash twice daily for two weeks.       Final diagnoses:   Sprain of right ankle, unspecified ligament,  initial encounter   Nummular eczema     Use crutches over the next 2 days.  Toe-touch as tolerated and increase weightbearing as tolerated over the next 4 to 5 days.  Work note given.    Please make an appointment to follow up with Your Primary Care Provider in 5-7 days for recheck.    Routine discharge instructions were given for the above diagnoses.    Bruce Pryor MD, MD      3/16/2020   [facility], EMERGENCY DEPARTMENT     Bruce Pryor MD  03/16/20 1934

## 2020-03-29 ENCOUNTER — HOSPITAL ENCOUNTER (INPATIENT)
Facility: CLINIC | Age: 30
LOS: 1 days | Discharge: HOME | DRG: 883 | End: 2020-03-30
Attending: EMERGENCY MEDICINE | Admitting: PSYCHIATRY & NEUROLOGY
Payer: COMMERCIAL

## 2020-03-29 ENCOUNTER — APPOINTMENT (OUTPATIENT)
Dept: GENERAL RADIOLOGY | Facility: CLINIC | Age: 30
DRG: 883 | End: 2020-03-29
Attending: EMERGENCY MEDICINE
Payer: COMMERCIAL

## 2020-03-29 ENCOUNTER — ANESTHESIA (OUTPATIENT)
Dept: SURGERY | Facility: CLINIC | Age: 30
DRG: 883 | End: 2020-03-29
Payer: COMMERCIAL

## 2020-03-29 ENCOUNTER — DOCUMENTATION ONLY (OUTPATIENT)
Dept: GASTROENTEROLOGY | Facility: CLINIC | Age: 30
End: 2020-03-29

## 2020-03-29 ENCOUNTER — ANESTHESIA EVENT (OUTPATIENT)
Dept: SURGERY | Facility: CLINIC | Age: 30
DRG: 883 | End: 2020-03-29
Payer: COMMERCIAL

## 2020-03-29 DIAGNOSIS — T18.9XXA SWALLOWED FOREIGN BODY, INITIAL ENCOUNTER: ICD-10-CM

## 2020-03-29 DIAGNOSIS — R45.89 AT HIGH RISK FOR SELF HARM: ICD-10-CM

## 2020-03-29 DIAGNOSIS — T18.9XXS FOREIGN BODY IN DIGESTIVE TRACT, SEQUELA: Primary | ICD-10-CM

## 2020-03-29 LAB
ANION GAP SERPL CALCULATED.3IONS-SCNC: 9 MMOL/L (ref 3–14)
BASOPHILS # BLD AUTO: 0 10E9/L (ref 0–0.2)
BASOPHILS NFR BLD AUTO: 0.4 %
BUN SERPL-MCNC: 8 MG/DL (ref 7–30)
CALCIUM SERPL-MCNC: 8.6 MG/DL (ref 8.5–10.1)
CHLORIDE SERPL-SCNC: 110 MMOL/L (ref 94–109)
CO2 SERPL-SCNC: 22 MMOL/L (ref 20–32)
CREAT SERPL-MCNC: 0.58 MG/DL (ref 0.52–1.04)
DIFFERENTIAL METHOD BLD: ABNORMAL
EOSINOPHIL # BLD AUTO: 0.2 10E9/L (ref 0–0.7)
EOSINOPHIL NFR BLD AUTO: 2.3 %
ERYTHROCYTE [DISTWIDTH] IN BLOOD BY AUTOMATED COUNT: 13.9 % (ref 10–15)
GFR SERPL CREATININE-BSD FRML MDRD: >90 ML/MIN/{1.73_M2}
GLUCOSE SERPL-MCNC: 122 MG/DL (ref 70–99)
HCT VFR BLD AUTO: 37.2 % (ref 35–47)
HGB BLD-MCNC: 11.6 G/DL (ref 11.7–15.7)
IMM GRANULOCYTES # BLD: 0 10E9/L (ref 0–0.4)
IMM GRANULOCYTES NFR BLD: 0.4 %
LYMPHOCYTES # BLD AUTO: 1.7 10E9/L (ref 0.8–5.3)
LYMPHOCYTES NFR BLD AUTO: 17 %
MCH RBC QN AUTO: 27.9 PG (ref 26.5–33)
MCHC RBC AUTO-ENTMCNC: 31.2 G/DL (ref 31.5–36.5)
MCV RBC AUTO: 89 FL (ref 78–100)
MONOCYTES # BLD AUTO: 0.5 10E9/L (ref 0–1.3)
MONOCYTES NFR BLD AUTO: 5.5 %
NEUTROPHILS # BLD AUTO: 7.2 10E9/L (ref 1.6–8.3)
NEUTROPHILS NFR BLD AUTO: 74.4 %
NRBC # BLD AUTO: 0 10*3/UL
NRBC BLD AUTO-RTO: 0 /100
PLATELET # BLD AUTO: 310 10E9/L (ref 150–450)
POTASSIUM SERPL-SCNC: 3.4 MMOL/L (ref 3.4–5.3)
RADIOLOGIST FLAGS: ABNORMAL
RBC # BLD AUTO: 4.16 10E12/L (ref 3.8–5.2)
SODIUM SERPL-SCNC: 142 MMOL/L (ref 133–144)
UPPER GI ENDOSCOPY: NORMAL
WBC # BLD AUTO: 9.7 10E9/L (ref 4–11)

## 2020-03-29 PROCEDURE — 25000128 H RX IP 250 OP 636: Performed by: NURSE ANESTHETIST, CERTIFIED REGISTERED

## 2020-03-29 PROCEDURE — 37000008 ZZH ANESTHESIA TECHNICAL FEE, 1ST 30 MIN: Performed by: INTERNAL MEDICINE

## 2020-03-29 PROCEDURE — 0DC68ZZ EXTIRPATION OF MATTER FROM STOMACH, VIA NATURAL OR ARTIFICIAL OPENING ENDOSCOPIC: ICD-10-PCS | Performed by: INTERNAL MEDICINE

## 2020-03-29 PROCEDURE — 36000053 ZZH SURGERY LEVEL 2 EA 15 ADDTL MIN - UMMC: Performed by: INTERNAL MEDICINE

## 2020-03-29 PROCEDURE — 27211024 ZZHC OR SUPPLY OTHER OPNP: Performed by: INTERNAL MEDICINE

## 2020-03-29 PROCEDURE — 37000009 ZZH ANESTHESIA TECHNICAL FEE, EACH ADDTL 15 MIN: Performed by: INTERNAL MEDICINE

## 2020-03-29 PROCEDURE — 74018 RADEX ABDOMEN 1 VIEW: CPT

## 2020-03-29 PROCEDURE — 25800030 ZZH RX IP 258 OP 636: Performed by: NURSE ANESTHETIST, CERTIFIED REGISTERED

## 2020-03-29 PROCEDURE — 80048 BASIC METABOLIC PNL TOTAL CA: CPT | Performed by: EMERGENCY MEDICINE

## 2020-03-29 PROCEDURE — 85025 COMPLETE CBC W/AUTO DIFF WBC: CPT | Performed by: EMERGENCY MEDICINE

## 2020-03-29 PROCEDURE — 71000014 ZZH RECOVERY PHASE 1 LEVEL 2 FIRST HR: Performed by: INTERNAL MEDICINE

## 2020-03-29 PROCEDURE — 99285 EMERGENCY DEPT VISIT HI MDM: CPT | Mod: 25 | Performed by: EMERGENCY MEDICINE

## 2020-03-29 PROCEDURE — 25000125 ZZHC RX 250: Performed by: INTERNAL MEDICINE

## 2020-03-29 PROCEDURE — 25000566 ZZH SEVOFLURANE, EA 15 MIN: Performed by: INTERNAL MEDICINE

## 2020-03-29 PROCEDURE — 36000051 ZZH SURGERY LEVEL 2 1ST 30 MIN - UMMC: Performed by: INTERNAL MEDICINE

## 2020-03-29 PROCEDURE — 99283 EMERGENCY DEPT VISIT LOW MDM: CPT | Mod: Z6 | Performed by: EMERGENCY MEDICINE

## 2020-03-29 PROCEDURE — 27210794 ZZH OR GENERAL SUPPLY STERILE: Performed by: INTERNAL MEDICINE

## 2020-03-29 RX ORDER — ONDANSETRON 2 MG/ML
4 INJECTION INTRAMUSCULAR; INTRAVENOUS EVERY 30 MIN PRN
Status: DISCONTINUED | OUTPATIENT
Start: 2020-03-29 | End: 2020-03-29 | Stop reason: HOSPADM

## 2020-03-29 RX ORDER — ONDANSETRON 2 MG/ML
INJECTION INTRAMUSCULAR; INTRAVENOUS PRN
Status: DISCONTINUED | OUTPATIENT
Start: 2020-03-29 | End: 2020-03-29

## 2020-03-29 RX ORDER — DEXAMETHASONE SODIUM PHOSPHATE 4 MG/ML
INJECTION, SOLUTION INTRA-ARTICULAR; INTRALESIONAL; INTRAMUSCULAR; INTRAVENOUS; SOFT TISSUE PRN
Status: DISCONTINUED | OUTPATIENT
Start: 2020-03-29 | End: 2020-03-29

## 2020-03-29 RX ORDER — PROPOFOL 10 MG/ML
INJECTION, EMULSION INTRAVENOUS PRN
Status: DISCONTINUED | OUTPATIENT
Start: 2020-03-29 | End: 2020-03-29

## 2020-03-29 RX ORDER — SODIUM CHLORIDE, SODIUM LACTATE, POTASSIUM CHLORIDE, CALCIUM CHLORIDE 600; 310; 30; 20 MG/100ML; MG/100ML; MG/100ML; MG/100ML
INJECTION, SOLUTION INTRAVENOUS CONTINUOUS
Status: DISCONTINUED | OUTPATIENT
Start: 2020-03-29 | End: 2020-03-29 | Stop reason: HOSPADM

## 2020-03-29 RX ORDER — SODIUM CHLORIDE, SODIUM LACTATE, POTASSIUM CHLORIDE, CALCIUM CHLORIDE 600; 310; 30; 20 MG/100ML; MG/100ML; MG/100ML; MG/100ML
INJECTION, SOLUTION INTRAVENOUS CONTINUOUS PRN
Status: DISCONTINUED | OUTPATIENT
Start: 2020-03-29 | End: 2020-03-29

## 2020-03-29 RX ORDER — ONDANSETRON 4 MG/1
4 TABLET, ORALLY DISINTEGRATING ORAL EVERY 30 MIN PRN
Status: DISCONTINUED | OUTPATIENT
Start: 2020-03-29 | End: 2020-03-29 | Stop reason: HOSPADM

## 2020-03-29 RX ORDER — NALOXONE HYDROCHLORIDE 0.4 MG/ML
.1-.4 INJECTION, SOLUTION INTRAMUSCULAR; INTRAVENOUS; SUBCUTANEOUS
Status: DISCONTINUED | OUTPATIENT
Start: 2020-03-29 | End: 2020-03-30 | Stop reason: HOSPADM

## 2020-03-29 RX ADMIN — PROPOFOL 200 MG: 10 INJECTION, EMULSION INTRAVENOUS at 18:52

## 2020-03-29 RX ADMIN — ONDANSETRON 4 MG: 2 INJECTION INTRAMUSCULAR; INTRAVENOUS at 19:12

## 2020-03-29 RX ADMIN — SODIUM CHLORIDE, POTASSIUM CHLORIDE, SODIUM LACTATE AND CALCIUM CHLORIDE: 600; 310; 30; 20 INJECTION, SOLUTION INTRAVENOUS at 18:30

## 2020-03-29 RX ADMIN — Medication 100 MG: at 18:52

## 2020-03-29 RX ADMIN — DEXAMETHASONE SODIUM PHOSPHATE 4 MG: 4 INJECTION, SOLUTION INTRA-ARTICULAR; INTRALESIONAL; INTRAMUSCULAR; INTRAVENOUS; SOFT TISSUE at 19:12

## 2020-03-29 RX ADMIN — PHENYLEPHRINE HYDROCHLORIDE 100 MCG: 10 INJECTION INTRAVENOUS at 18:58

## 2020-03-29 ASSESSMENT — MIFFLIN-ST. JEOR: SCORE: 1839.92

## 2020-03-29 NOTE — ED TRIAGE NOTES
BIBA from home after swallowing a paperclip due to anxiety from social isolation. C/o sharp abdominal pain 7/10. Patient endorses a slight cough/tickle in her throat. Otherwise denies all other COVID-19 signs or symptoms, denies being in contact with anyone who has been sick.

## 2020-03-29 NOTE — ED PROVIDER NOTES
ED Provider Note  [facility]    History     Chief Complaint   Patient presents with     Swallowed Foreign Body     HPI  Nevin Alvarado is a 29 year old female with a history of PE and multiple admissions for intentional swallowed foreign body who presents to the Emergency Department today for evaluation after intentionally swallowing a foreign object.     I have reviewed the Medications, Allergies, Past Medical and Surgical History, and Social History in the Epic system.    Past Medical History:   Diagnosis Date     ADD (attention deficit disorder)      Anorexia nervosa with bulimia     history of; on Topamax     Anxiety      Borderline personality disorder (H)      Depression      Depressive disorder      H/O self-harm      Lives in independent group home     due to debilitating mental illness     Migraine without aura     no known triggers; on Topamax bid and Imitrex PRN     Morbid obesity (H)      PTSD (post-traumatic stress disorder)      Rectal foreign body - Recurrent issue, self placed      Swallowed foreign body - Recurrent issue, self placed        Past Surgical History:   Procedure Laterality Date     COMBINED ESOPHAGOSCOPY, GASTROSCOPY, DUODENOSCOPY (EGD), REPLACE ESOPHAGEAL STENT N/A 10/9/2019    Procedure: Upper Endoscopy with Suture Placement;  Surgeon: Zurdo Ramirez MD;  [facility]: [facility]     ESOPHAGOSCOPY, GASTROSCOPY, DUODENOSCOPY (EGD), COMBINED N/A 3/9/2017    Procedure: COMBINED ESOPHAGOSCOPY, GASTROSCOPY, DUODENOSCOPY (EGD), REMOVE FOREIGN BODY;  Surgeon: Avis Guzmán MD;  Location: [facility]     ESOPHAGOSCOPY, GASTROSCOPY, DUODENOSCOPY (EGD), COMBINED N/A 4/20/2017    Procedure: COMBINED ESOPHAGOSCOPY, GASTROSCOPY, DUODENOSCOPY (EGD), REMOVE FOREIGN BODY;  EGD removal Foregin body;  Surgeon: Lokesh Paula MD;  Location: [facility]     ESOPHAGOSCOPY, GASTROSCOPY, DUODENOSCOPY (EGD), COMBINED N/A 6/12/2017    Procedure: COMBINED  ESOPHAGOSCOPY, GASTROSCOPY, DUODENOSCOPY (EGD);  COMBINED ESOPHAGOSCOPY, GASTROSCOPY, DUODENOSCOPY (EGD) [[id number]]attempted removal of foreign body;  Surgeon: Pamela Perez MD;  [facility]     ESOPHAGOSCOPY, GASTROSCOPY, DUODENOSCOPY (EGD), COMBINED N/A 6/9/2017    Procedure: COMBINED ESOPHAGOSCOPY, GASTROSCOPY, DUODENOSCOPY (EGD), REMOVE FOREIGN BODY;  Esophagoscopy, Gastroscopy, Duodenoscopy, Removal of Foreign Body;  Surgeon: Dejon Marsh MD;  [facility]     ESOPHAGOSCOPY, GASTROSCOPY, DUODENOSCOPY (EGD), COMBINED N/A 1/6/2018    Procedure: COMBINED ESOPHAGOSCOPY, GASTROSCOPY, DUODENOSCOPY (EGD), REMOVE FOREIGN BODY;  COMBINED ESOPHAGOSCOPY, GASTROSCOPY, DUODENOSCOPY (EGD) [by pascal net and snare with profol sedation;  Surgeon: Feliciano Emmanuel MD;  Location:  GI     ESOPHAGOSCOPY, GASTROSCOPY, DUODENOSCOPY (EGD), COMBINED N/A 3/19/2018    Procedure: COMBINED ESOPHAGOSCOPY, GASTROSCOPY, DUODENOSCOPY (EGD), REMOVE FOREIGN BODY;   Esophagodscopy, Gastroscopy, Duodenoscopy,Foreign Body Removal;  Surgeon: Brice Guzmán MD;  [facility]     ESOPHAGOSCOPY, GASTROSCOPY, DUODENOSCOPY (EGD), COMBINED N/A 4/16/2018    Procedure: COMBINED ESOPHAGOSCOPY, GASTROSCOPY, DUODENOSCOPY (EGD), REMOVE FOREIGN BODY;  Esophagogastroduodenoscopy  Foreign Body Removal X 2;  Surgeon: Royer Moise MD;  [facility]     ESOPHAGOSCOPY, GASTROSCOPY, DUODENOSCOPY (EGD), COMBINED N/A 6/1/2018    Procedure: COMBINED ESOPHAGOSCOPY, GASTROSCOPY, DUODENOSCOPY (EGD), REMOVE FOREIGN BODY;  COMBINED ESOPHAGOSCOPY, GASTROSCOPY, DUODENOSCOPY with removal of foreign body, propofol sedation by anesthesia;  Surgeon: Brice Martinez MD;  Location:  GI     ESOPHAGOSCOPY, GASTROSCOPY, DUODENOSCOPY (EGD), COMBINED N/A 7/25/2018    Procedure: COMBINED ESOPHAGOSCOPY, GASTROSCOPY, DUODENOSCOPY (EGD), REMOVE FOREIGN BODY;;  Surgeon: Candy Castelan MD;  Location:  GI      ESOPHAGOSCOPY, GASTROSCOPY, DUODENOSCOPY (EGD), COMBINED N/A 7/28/2018    Procedure: COMBINED ESOPHAGOSCOPY, GASTROSCOPY, DUODENOSCOPY (EGD), REMOVE FOREIGN BODY;  COMBINED ESOPHAGOSCOPY, GASTROSCOPY, DUODENOSCOPY (EGD), REMOVE FOREIGN BODY;  Surgeon: Brice Guzmán MD;  [facility]     ESOPHAGOSCOPY, GASTROSCOPY, DUODENOSCOPY (EGD), COMBINED N/A 7/31/2018    Procedure: COMBINED ESOPHAGOSCOPY, GASTROSCOPY, DUODENOSCOPY (EGD);  COMBINED ESOPHAGOSCOPY, GASTROSCOPY, DUODENOSCOPY (EGD) TO REMOVE FOREIGN BODY;  Surgeon: Lokesh Paula MD;  [facility]     ESOPHAGOSCOPY, GASTROSCOPY, DUODENOSCOPY (EGD), COMBINED N/A 8/4/2018    Procedure: COMBINED ESOPHAGOSCOPY, GASTROSCOPY, DUODENOSCOPY (EGD), REMOVE FOREIGN BODY;   combined esophagoscopy, gastroscopy, duodenoscopy, REMOVE FOREIGN BODY ;  Surgeon: Lokesh Paula MD;  [facility]     ESOPHAGOSCOPY, GASTROSCOPY, DUODENOSCOPY (EGD), COMBINED N/A 10/6/2019    Procedure: ESOPHAGOGASTRODUODENOSCOPY (EGD) with fireign body removal x2, esophageal stent placement with floroscopy;  Surgeon: Timoteo Espana MD;  [facility]     ESOPHAGOSCOPY, GASTROSCOPY, DUODENOSCOPY (EGD), COMBINED N/A 12/2/2019    Procedure: Esophagogastroduodenoscopy with esophageal stent removal, esophogram;  Surgeon: Kailee Tena MD;  [facility]     ESOPHAGOSCOPY, GASTROSCOPY, DUODENOSCOPY (EGD), COMBINED N/A 12/17/2019    Procedure: ESOPHAGOGASTRODUODENOSCOPY, WITH FOREIGN BODY REMOVAL;  Surgeon: Pamela Perez MD;  [facility]     ESOPHAGOSCOPY, GASTROSCOPY, DUODENOSCOPY (EGD), COMBINED N/A 12/13/2019    Procedure: ESOPHAGOGASTRODUODENOSCOPY, WITH FOREIGN BODY REMOVAL;  Surgeon: Samia Stanton MD;  [facility]     ESOPHAGOSCOPY, GASTROSCOPY, DUODENOSCOPY (EGD), COMBINED N/A 12/28/2019    Procedure: ESOPHAGOGASTRODUODENOSCOPY (EGD) Removal of Foreign Body X 2;  Surgeon: Huy Kelley MD;  [facility]     ESOPHAGOSCOPY,  GASTROSCOPY, DUODENOSCOPY (EGD), COMBINED N/A 1/5/2020    Procedure: ESOPHAGOGASTRODUOENOSCOPY WITH FOREIGN BODY REMOVAL;  Surgeon: Pamela Perez MD;  [facility]     ESOPHAGOSCOPY, GASTROSCOPY, DUODENOSCOPY (EGD), COMBINED N/A 1/3/2020    Procedure: ESOPHAGOGASTRODUODENOSCOPY (EGD) REMOVAL OF FOREIGN BODY.;  Surgeon: Pamela Perez MD;  [facility]     ESOPHAGOSCOPY, GASTROSCOPY, DUODENOSCOPY (EGD), COMBINED N/A 1/13/2020    Procedure: ESOPHAGOGASTRODUODENOSCOPY (EGD) for foreign body removal;  Surgeon: Lokesh Paula MD;  [facility]     ESOPHAGOSCOPY, GASTROSCOPY, DUODENOSCOPY (EGD), COMBINED N/A 1/18/2020    Procedure: Diagnostic ESOPHAGOGASTRODUODENOSCOPY (EGD;  Surgeon: Lokesh Paula MD;  [facility]     EXAM UNDER ANESTHESIA ANUS N/A 1/10/2017    Procedure: EXAM UNDER ANESTHESIA ANUS;  Surgeon: Annmarie Haynes MD;  [facility]     EXAM UNDER ANESTHESIA RECTUM N/A 7/19/2018    Procedure: EXAM UNDER ANESTHESIA RECTUM;  EXAM UNDER ANESTHESIA, REMOVAL OF RECTAL FOREIGN BODY;  Surgeon: Annmarie Haynes MD;  [facility]     HC REMOVE FECAL IMPACTION OR FB W ANESTHESIA N/A 12/18/2016    Procedure: REMOVE FECAL IMPACTION/FOREIGN BODY UNDER ANESTHESIA;  Surgeon: Soham Cano MD;  Location: [facility]     KNEE SURGERY - removed a small tissue mass from knee Right      LAPAROSCOPIC ABLATION ENDOMETRIOSIS       LAPAROTOMY EXPLORATORY N/A 1/10/2017    Procedure: LAPAROTOMY EXPLORATORY;  Surgeon: Annmarie Haynes MD;  [facility]     LAPAROTOMY EXPLORATORY  09/11/2019    Manual manipulation of bowel to remove pill bottle in rectum     lymph nodes removed from neck; benign  age 7     MAMMOPLASTY REDUCTION Bilateral      RELEASE CARPAL TUNNEL Bilateral      SIGMOIDOSCOPY FLEXIBLE N/A 1/10/2017    Procedure: SIGMOIDOSCOPY FLEXIBLE;  Surgeon: Annmarie Haynes MD;  [facility]     SIGMOIDOSCOPY FLEXIBLE N/A  5/8/2018    Procedure: SIGMOIDOSCOPY FLEXIBLE;  flex sig with foreign body removal using snare and rattooth forcep;  Surgeon: Soham Cano MD;  Location:  GI     SIGMOIDOSCOPY FLEXIBLE N/A 2/20/2019    Procedure: Exam under anesthesia Colonoscopy with attempted  removal of rectal foreign body;  Surgeon: Estrada Chávez MD;  [facility]       Family History   Problem Relation Age of Onset     Diabetes Type 2  Maternal Grandmother      Diabetes Type 2  Paternal Grandmother      Breast Cancer Paternal Grandmother      Cerebrovascular Disease Father 53     Myocardial Infarction No family hx of      Coronary Artery Disease Early Onset No family hx of      Ovarian Cancer No family hx of      Colon Cancer No family hx of        Social History     Tobacco Use     Smoking status: Never Smoker     Smokeless tobacco: Never Used   Substance Use Topics     Alcohol use: No     Alcohol/week: 0.0 standard drinks       Current Facility-Administered Medications   Medication     naloxone (NARCAN) injection 0.1-0.4 mg     Current Outpatient Medications   Medication     acetaminophen (TYLENOL) 32 mg/mL liquid     albuterol (PROAIR HFA) 108 (90 Base) MCG/ACT inhaler     apixaban ANTICOAGULANT (ELIQUIS) 5 MG tablet     busPIRone (BUSPAR) 10 MG tablet     Cholecalciferol (VITAMIN D) 50 MCG (2000 UT) CAPS     cloNIDine (CATAPRES) 0.1 MG tablet     desvenlafaxine (PRISTIQ) 100 MG 24 hr tablet     docosanol (ABREVA) 10 % CREA cream     fluticasone-salmeterol (ADVAIR) 100-50 MCG/DOSE inhaler     gabapentin (NEURONTIN) 600 MG tablet     hydrocortisone (CORTAID) 1 % external cream     hydrOXYzine (ATARAX) 50 MG tablet     levonorgestrel (MIRENA) 20 MCG/24HR IUD     lurasidone (LATUDA) 60 MG TABS tablet     metFORMIN (GLUCOPHAGE-XR) 500 MG 24 hr tablet     ondansetron (ZOFRAN-ODT) 4 MG ODT tab     topiramate (TOPAMAX) 100 MG tablet        Allergies   Allergen Reactions     Amoxicillin-Pot Clavulanate Other (See Comments), Rash and  "Swelling     PN: facial swelling, left side. Also had cortisone injection the same day as she started the Augmentin.  PN: facial swelling, left side. Also had cortisone injection the same day as she started the Augmentin.  Noted in downtime recovery of chart.       Penicillins Anaphylaxis     Vancomycin Swelling, Itching and Rash     Other reaction(s): Redness  Flushed face, very itchy; HUT Comment: Flushed face, very itchy; HUT Reaction: Angioedema; HUT Reaction: Redness; HUT Severity: Med; HUT Noted: [id number]  Flushed face, very itchy  Flushed face, very itchy  Flushed face, very itchy       Hydrocodone Nausea and Vomiting and GI Disturbance     vomiting for days  vomiting for days  vomiting for days  vomiting for days, PN: vomiting for days  vomiting for days  vomiting for days  vomiting for days  vomiting for days  vomiting for days  vomiting for days, PN: vomiting for days  vomiting for days  vomiting for days  vomiting for days  vomiting for days  ; HUT Comment: vomiting for days; HUT Reaction: Gastrointestinal; HUT Reaction: Nausea And Vomiting; HUT Reaction Type: Intolerance; HUT Severity: Med; HUT Noted: [id number]  vomiting for days       Blood-Group Specific Substance Other (See Comments)     Patient has an anti-Cw and non-specific antibodies. Blood product orders may be delayed. Draw one red top and two purple top tubes for all type/screen/crossmatch orders.     Influenza Vaccines Other (See Comments)     Oseltamivir Hives     med stopped, PN: med stopped     Cephalosporins Rash     Lamotrigine Rash     Possibly associated with Lamictal.   HUT Comment: Possibly associated with Lamictal. ; HUT Reaction: Rash; HUT Reaction Type: Allergy; HUT Severity: Low; HUT Noted: [id number]     Latex Rash      Review of Systems    Physical Exam   BP: (!) 138/98  Pulse: 94  Temp: 98.8  F (37.1  C)  Resp: 18  Height: 157.5 cm (5' 2\")  Weight: 115.7 kg (255 lb)  SpO2: 99 %    Physical Exam  Constitutional:       General: She " is not in acute distress.     Appearance: She is well-developed. She is not ill-appearing, toxic-appearing or diaphoretic.      Comments: Comfortably resting, lying in bed, NAD, nondiaphoretic, lucid, fully conversant, no  respiratory distress, alert and oriented.     HENT:      Head: Normocephalic and atraumatic.      Mouth/Throat:      Mouth: Mucous membranes are dry.      Pharynx: No oropharyngeal exudate.   Eyes:      General: No scleral icterus.     Pupils: Pupils are equal, round, and reactive to light.   Neck:      Musculoskeletal: Normal range of motion and neck supple.   Cardiovascular:      Rate and Rhythm: Normal rate.      Heart sounds: Normal heart sounds.   Pulmonary:      Effort: No respiratory distress.      Breath sounds: Normal breath sounds.   Abdominal:      Palpations: Abdomen is soft.      Tenderness: There is no abdominal tenderness.   Musculoskeletal:         General: No tenderness.   Skin:     General: Skin is warm and dry.      Coloration: Skin is not pale.      Findings: No erythema or rash.   Neurological:      Mental Status: She is alert and oriented to person, place, and time.         ED Course   The patient was seen and examined by Dr. Velasquez in ED 14.         Procedures                                Assessments & Plan (with Medical Decision Making)   This is a 28-year-old female patient coming into the emergency room after swallowing a straightened out paperclip.  This is well-known for this patient to be doing this and we are very familiar with her case here in the emergency room.  GI was consulted after an x-ray shows that there was a straightened paperclip.  They were able to successfully retrieve the paperclip with endoscopy.  They stated that there is no complications.  Their recommendation because of the current nature of the coronavirus pandemic is that she needs a psych admission with an ethics consult.  They are concerned that when she starts swallowing items that she has  "patterns of multiple days of doing similar issues.  They are concerned that she will put staff at risk as well as to utilize flory resources.  I discussed this with the patient and at this time she will be admitted to psychiatry with a 72-hour hold.    I have reviewed the nursing notes.    I have reviewed the findings, diagnosis, plan and need for follow up with the patient.    New Prescriptions    No medications on file     Final diagnoses:   Swallowed foreign body, initial encounter   At high risk for self harm       \"This dictation was performed with the assistance of voice recognition software and may contain inadvertant transcription  errors,  omissions and/or  inadvertent word substitution.\" --Jeffy Velasquez MD     3/29/2020   [facility], [place], EMERGENCY DEPARTMENT     Jeffy Velasquez MD  03/29/20 2156    "

## 2020-03-29 NOTE — ANESTHESIA PREPROCEDURE EVALUATION
Anesthesia Pre-Procedure Evaluation    Patient: Nevin Alvarado   MRN:     [id number] Gender:   female   Age:    29 year old :      1990        Preoperative Diagnosis: Ingestion of foreign body [T18.9XXA]   Procedure(s):  ESOPHAGOGASTRODUODENOSCOPY     LABS:  CBC:   Lab Results   Component Value Date    WBC 9.7 2020    WBC 8.1 02/15/2020    HGB 11.6 (L) 2020    HGB 11.5 (L) 02/15/2020    HCT 37.2 2020    HCT 36.6 02/15/2020     2020     02/15/2020     BMP:   Lab Results   Component Value Date     2020     02/15/2020    POTASSIUM 3.4 2020    POTASSIUM 3.8 02/15/2020    CHLORIDE 110 (H) 2020    CHLORIDE 112 (H) 02/15/2020    CO2 22 2020    CO2 21 02/15/2020    BUN 8 2020    BUN 12 02/15/2020    CR 0.58 2020    CR 0.58 02/15/2020     (H) 2020     (H) 02/15/2020     COAGS:   Lab Results   Component Value Date    PTT 34 2020    INR 1.16 (H) 2020     POC:   Lab Results   Component Value Date    BGM 95 2020    HCG Negative 2020    HCGS Negative 2019     OTHER:   Lab Results   Component Value Date    LACT 1.0 2019    KELBY 8.6 2020    PHOS 3.5 2019    MAG 1.9 2019    ALBUMIN 3.1 (L) 02/15/2020    PROTTOTAL 7.3 02/15/2020    ALT 20 02/15/2020    AST 21 02/15/2020    ALKPHOS 95 02/15/2020    BILITOTAL 0.2 02/15/2020    LIPASE 146 02/15/2020    TSH 4.56 (H) 02/15/2020    T4 0.78 02/15/2020    CRP 6.6 2019    SED 26 (H) 2017        Preop Vitals    BP Readings from Last 3 Encounters:   20 (!) 138/98   20 (!) 144/94   20 125/75    Pulse Readings from Last 3 Encounters:   20 86   20 92   02/15/20 77      Resp Readings from Last 3 Encounters:   20 18   20 14   20 18    SpO2 Readings from Last 3 Encounters:   20 98%   20 95%   20 98%      Temp Readings from Last 1 Encounters:   20  "37.1  C (98.8  F) (Oral)    Ht Readings from Last 1 Encounters:   03/29/20 1.575 m (5' 2\")      Wt Readings from Last 1 Encounters:   03/29/20 115.7 kg (255 lb)    Estimated body mass index is 46.64 kg/m  as calculated from the following:    Height as of this encounter: 1.575 m (5' 2\").    Weight as of this encounter: 115.7 kg (255 lb).     LDA:  Port A Cath Single 12/17/19 Right Chest wall (Active)   Number of days: 103       ETT (Active)   Number of days: 0        Past Medical History:   Diagnosis Date     ADD (attention deficit disorder)      Anorexia nervosa with bulimia     history of; on Topamax     Anxiety      Borderline personality disorder (H)      Depression      Depressive disorder      H/O self-harm      Lives in independent group home     due to debilitating mental illness     Migraine without aura     no known triggers; on Topamax bid and Imitrex PRN     Morbid obesity (H)      PTSD (post-traumatic stress disorder)      Rectal foreign body - Recurrent issue, self placed      Swallowed foreign body - Recurrent issue, self placed       Past Surgical History:   Procedure Laterality Date     COMBINED ESOPHAGOSCOPY, GASTROSCOPY, DUODENOSCOPY (EGD), REPLACE ESOPHAGEAL STENT N/A 10/9/2019    Procedure: Upper Endoscopy with Suture Placement;  Surgeon: Zurdo Ramirez MD;  Location: [facility]     ESOPHAGOSCOPY, GASTROSCOPY, DUODENOSCOPY (EGD), COMBINED N/A 3/9/2017    Procedure: COMBINED ESOPHAGOSCOPY, GASTROSCOPY, DUODENOSCOPY (EGD), REMOVE FOREIGN BODY;  Surgeon: Avis Guzmán MD;  Location: [facility]     ESOPHAGOSCOPY, GASTROSCOPY, DUODENOSCOPY (EGD), COMBINED N/A 4/20/2017    Procedure: COMBINED ESOPHAGOSCOPY, GASTROSCOPY, DUODENOSCOPY (EGD), REMOVE FOREIGN BODY;  EGD removal Foregin body;  Surgeon: Lokesh Paula MD;  Location: [facility]     ESOPHAGOSCOPY, GASTROSCOPY, DUODENOSCOPY (EGD), COMBINED N/A 6/12/2017    Procedure: COMBINED ESOPHAGOSCOPY, GASTROSCOPY, DUODENOSCOPY (EGD);  COMBINED " ESOPHAGOSCOPY, GASTROSCOPY, DUODENOSCOPY (EGD) [[id number]]attempted removal of foreign body;  Surgeon: Pamela Perez MD;  Location: [facility]     ESOPHAGOSCOPY, GASTROSCOPY, DUODENOSCOPY (EGD), COMBINED N/A 6/9/2017    Procedure: COMBINED ESOPHAGOSCOPY, GASTROSCOPY, DUODENOSCOPY (EGD), REMOVE FOREIGN BODY;  Esophagoscopy, Gastroscopy, Duodenoscopy, Removal of Foreign Body;  Surgeon: Dejon Marsh MD;  Location: [facility]     ESOPHAGOSCOPY, GASTROSCOPY, DUODENOSCOPY (EGD), COMBINED N/A 1/6/2018    Procedure: COMBINED ESOPHAGOSCOPY, GASTROSCOPY, DUODENOSCOPY (EGD), REMOVE FOREIGN BODY;  COMBINED ESOPHAGOSCOPY, GASTROSCOPY, DUODENOSCOPY (EGD) [by pascal net and snare with profol sedation;  Surgeon: Feliciano Emmanuel MD;  Location: [facility]     ESOPHAGOSCOPY, GASTROSCOPY, DUODENOSCOPY (EGD), COMBINED N/A 3/19/2018    Procedure: COMBINED ESOPHAGOSCOPY, GASTROSCOPY, DUODENOSCOPY (EGD), REMOVE FOREIGN BODY;   Esophagodscopy, Gastroscopy, Duodenoscopy,Foreign Body Removal;  Surgeon: Brice Guzmán MD;  Location: [facility]     ESOPHAGOSCOPY, GASTROSCOPY, DUODENOSCOPY (EGD), COMBINED N/A 4/16/2018    Procedure: COMBINED ESOPHAGOSCOPY, GASTROSCOPY, DUODENOSCOPY (EGD), REMOVE FOREIGN BODY;  Esophagogastroduodenoscopy  Foreign Body Removal X 2;  Surgeon: Royer Moise MD;  Location: [facility]     ESOPHAGOSCOPY, GASTROSCOPY, DUODENOSCOPY (EGD), COMBINED N/A 6/1/2018    Procedure: COMBINED ESOPHAGOSCOPY, GASTROSCOPY, DUODENOSCOPY (EGD), REMOVE FOREIGN BODY;  COMBINED ESOPHAGOSCOPY, GASTROSCOPY, DUODENOSCOPY with removal of foreign body, propofol sedation by anesthesia;  Surgeon: Brice Martinez MD;  Location: [facility]     ESOPHAGOSCOPY, GASTROSCOPY, DUODENOSCOPY (EGD), COMBINED N/A 7/25/2018    Procedure: COMBINED ESOPHAGOSCOPY, GASTROSCOPY, DUODENOSCOPY (EGD), REMOVE FOREIGN BODY;;  Surgeon: Candy Castelan MD;  Location: [facility]     ESOPHAGOSCOPY, GASTROSCOPY, DUODENOSCOPY (EGD), COMBINED N/A  7/28/2018    Procedure: COMBINED ESOPHAGOSCOPY, GASTROSCOPY, DUODENOSCOPY (EGD), REMOVE FOREIGN BODY;  COMBINED ESOPHAGOSCOPY, GASTROSCOPY, DUODENOSCOPY (EGD), REMOVE FOREIGN BODY;  Surgeon: Brice Guzmán MD;  [facility]: [facility]     ESOPHAGOSCOPY, GASTROSCOPY, DUODENOSCOPY (EGD), COMBINED N/A 7/31/2018    Procedure: COMBINED ESOPHAGOSCOPY, GASTROSCOPY, DUODENOSCOPY (EGD);  COMBINED ESOPHAGOSCOPY, GASTROSCOPY, DUODENOSCOPY (EGD) TO REMOVE FOREIGN BODY;  Surgeon: Lokesh Paula MD;  Location: [facility]     ESOPHAGOSCOPY, GASTROSCOPY, DUODENOSCOPY (EGD), COMBINED N/A 8/4/2018    Procedure: COMBINED ESOPHAGOSCOPY, GASTROSCOPY, DUODENOSCOPY (EGD), REMOVE FOREIGN BODY;   combined esophagoscopy, gastroscopy, duodenoscopy, REMOVE FOREIGN BODY ;  Surgeon: Lokesh Paula MD;  Location: [facility]     ESOPHAGOSCOPY, GASTROSCOPY, DUODENOSCOPY (EGD), COMBINED N/A 10/6/2019    Procedure: ESOPHAGOGASTRODUODENOSCOPY (EGD) with fireign body removal x2, esophageal stent placement with floroscopy;  Surgeon: Timoteo Espana MD;  Location: [facility]     ESOPHAGOSCOPY, GASTROSCOPY, DUODENOSCOPY (EGD), COMBINED N/A 12/2/2019    Procedure: Esophagogastroduodenoscopy with esophageal stent removal, esophogram;  Surgeon: Kailee Tena MD;  Location: [facility]     ESOPHAGOSCOPY, GASTROSCOPY, DUODENOSCOPY (EGD), COMBINED N/A 12/17/2019    Procedure: ESOPHAGOGASTRODUODENOSCOPY, WITH FOREIGN BODY REMOVAL;  Surgeon: Pamela Perez MD;  [facility]: [facility]     ESOPHAGOSCOPY, GASTROSCOPY, DUODENOSCOPY (EGD), COMBINED N/A 12/13/2019    Procedure: ESOPHAGOGASTRODUODENOSCOPY, WITH FOREIGN BODY REMOVAL;  Surgeon: Samia Stanton MD;  [facility]: [facility]     ESOPHAGOSCOPY, GASTROSCOPY, DUODENOSCOPY (EGD), COMBINED N/A 12/28/2019    Procedure: ESOPHAGOGASTRODUODENOSCOPY (EGD) Removal of Foreign Body X 2;  Surgeon: Huy Kelley MD;  [facility]: [facility]     ESOPHAGOSCOPY, GASTROSCOPY, DUODENOSCOPY (EGD), COMBINED N/A 1/5/2020    Procedure:  ESOPHAGOGASTRODUOENOSCOPY WITH FOREIGN BODY REMOVAL;  Surgeon: Pamela Perez MD;  [facility]: [facility]     ESOPHAGOSCOPY, GASTROSCOPY, DUODENOSCOPY (EGD), COMBINED N/A 1/3/2020    Procedure: ESOPHAGOGASTRODUODENOSCOPY (EGD) REMOVAL OF FOREIGN BODY.;  Surgeon: Pamela Perez MD;  [facility]: [facility]     ESOPHAGOSCOPY, GASTROSCOPY, DUODENOSCOPY (EGD), COMBINED N/A 1/13/2020    Procedure: ESOPHAGOGASTRODUODENOSCOPY (EGD) for foreign body removal;  Surgeon: Lokesh Paula MD;  [facility]: [facility]     ESOPHAGOSCOPY, GASTROSCOPY, DUODENOSCOPY (EGD), COMBINED N/A 1/18/2020    Procedure: Diagnostic ESOPHAGOGASTRODUODENOSCOPY (EGD;  Surgeon: Lokesh Paula MD;  [facility]     EXAM UNDER ANESTHESIA ANUS N/A 1/10/2017    Procedure: EXAM UNDER ANESTHESIA ANUS;  Surgeon: Annmarie Haynes MD;  [facility]     EXAM UNDER ANESTHESIA RECTUM N/A 7/19/2018    Procedure: EXAM UNDER ANESTHESIA RECTUM;  EXAM UNDER ANESTHESIA, REMOVAL OF RECTAL FOREIGN BODY;  Surgeon: Annmarie Haynes MD;  Location: [facility]     HC REMOVE FECAL IMPACTION OR FB W ANESTHESIA N/A 12/18/2016    Procedure: REMOVE FECAL IMPACTION/FOREIGN BODY UNDER ANESTHESIA;  Surgeon: Soham Cano MD;  Location: [facility]     KNEE SURGERY - removed a small tissue mass from knee Right      LAPAROSCOPIC ABLATION ENDOMETRIOSIS       LAPAROTOMY EXPLORATORY N/A 1/10/2017    Procedure: LAPAROTOMY EXPLORATORY;  Surgeon: Annmarie Haynes MD;  Location: [facility]     LAPAROTOMY EXPLORATORY  09/11/2019    Manual manipulation of bowel to remove pill bottle in rectum     lymph nodes removed from neck; benign  age 7     MAMMOPLASTY REDUCTION Bilateral      RELEASE CARPAL TUNNEL Bilateral      SIGMOIDOSCOPY FLEXIBLE N/A 1/10/2017    Procedure: SIGMOIDOSCOPY FLEXIBLE;  Surgeon: Annmarie Haynes MD;  Location: [facility]     SIGMOIDOSCOPY FLEXIBLE N/A 5/8/2018    Procedure: SIGMOIDOSCOPY FLEXIBLE;  flex sig with foreign  body removal using snare and rattooth forcep;  Surgeon: Soham Cano MD;  [facility]     SIGMOIDOSCOPY FLEXIBLE N/A 2/20/2019    Procedure: Exam under anesthesia Colonoscopy with attempted  removal of rectal foreign body;  Surgeon: Estrada Chávez MD;  Location: [facility]      Allergies   Allergen Reactions     Amoxicillin-Pot Clavulanate Other (See Comments), Rash and Swelling     PN: facial swelling, left side. Also had cortisone injection the same day as she started the Augmentin.  PN: facial swelling, left side. Also had cortisone injection the same day as she started the Augmentin.  Noted in downtime recovery of chart.       Penicillins Anaphylaxis     Vancomycin Swelling, Itching and Rash     Other reaction(s): Redness  Flushed face, very itchy; HUT Comment: Flushed face, very itchy; HUT Reaction: Angioedema; HUT Reaction: Redness; HUT Severity: Med; HUT Noted: [id number]  Flushed face, very itchy  Flushed face, very itchy  Flushed face, very itchy       Hydrocodone Nausea and Vomiting and GI Disturbance     vomiting for days  vomiting for days  vomiting for days  vomiting for days, PN: vomiting for days  vomiting for days  vomiting for days  vomiting for days  vomiting for days  vomiting for days  vomiting for days, PN: vomiting for days  vomiting for days  vomiting for days  vomiting for days  vomiting for days  ; HUT Comment: vomiting for days; HUT Reaction: Gastrointestinal; HUT Reaction: Nausea And Vomiting; HUT Reaction Type: Intolerance; HUT Severity: Med; HUT Noted: [id number]  vomiting for days       Blood-Group Specific Substance Other (See Comments)     Patient has an anti-Cw and non-specific antibodies. Blood product orders may be delayed. Draw one red top and two purple top tubes for all type/screen/crossmatch orders.     Influenza Vaccines Other (See Comments)     Oseltamivir Hives     med stopped, PN: med stopped     Cephalosporins Rash     Lamotrigine Rash     Possibly associated with  Lamictal.   HUT Comment: Possibly associated with Lamictal. ; HUT Reaction: Rash; HUT Reaction Type: Allergy; HUT Severity: Low; HUT Noted: [id number]     Latex Rash        Anesthesia Evaluation     . Pt has had prior anesthetic. Type: General           ROS/MED HX    ENT/Pulmonary:     (+)ZOHRA risk factors obese, , . .    Neurologic:  - neg neurologic ROS     Cardiovascular:         METS/Exercise Tolerance:     Hematologic:  - neg hematologic  ROS       Musculoskeletal:  - neg musculoskeletal ROS       GI/Hepatic:         Renal/Genitourinary:  - ROS Renal section negative       Endo:         Psychiatric:     (+) psychiatric history bipolar      Infectious Disease:  - neg infectious disease ROS       Malignancy:      - no malignancy   Other:                         PHYSICAL EXAM:   Mental Status/Neuro: A/A/O   Airway: Facies: Feasible  Mallampati: I  Mouth/Opening: Full  TM distance: > 6 cm  Neck ROM: Full   Respiratory: Auscultation: CTAB     Resp. Rate: Normal     Resp. Effort: Normal      CV: Rhythm: Regular  Rate: Age appropriate  Heart: Normal Sounds  Edema: None   Comments:      Dental: Normal Dentition                Assessment:   ASA SCORE: 2 emergent   H&P: History and physical reviewed and following examination; no interval change.   Smoking Status:  Non-Smoker/Unknown   NPO Status: NPO Appropriate     Plan:   Anes. Type:  General      Induction:  IV (RSI)   Airway: ETT      Maintenance: Balanced     Postop Plan:   Postop Pain: None  Postop Sedation/Airway: Not planned  Disposition: Outpatient     PONV Management:   Adult Risk Factors: Female, Non-Smoker   Prevention: Ondansetron     CONSENT: Direct conversation   Plan and risks discussed with: Patient   Blood Products: Consent Deferred (Minimal Blood Loss)                   Reece Mendez MD

## 2020-03-29 NOTE — PROGRESS NOTES
Received call from Dr. Velasquez regarding patient who swallowed paper clip this afternoon. Visualized straightened out on AXR. Likely in stomach. Patient is known to hospital - frequently swallows foreign bodies requiring general anesthesia in the OR for removal. We will plan emergent removal in OR this evening - patient added on to schedule. Discussed with anesthesia and guardian gave verbal consent to proceed.    We recommend admission to psychiatry with ethics consult (please see addendum by Dr. Hansen for further details).     Recommendations relayed to Dr. Ron ED MD.    Daly Simeon MD  GI Fellow   Pager [phone]

## 2020-03-30 VITALS
RESPIRATION RATE: 18 BRPM | BODY MASS INDEX: 46.93 KG/M2 | WEIGHT: 255 LBS | DIASTOLIC BLOOD PRESSURE: 97 MMHG | HEIGHT: 62 IN | SYSTOLIC BLOOD PRESSURE: 128 MMHG | OXYGEN SATURATION: 98 % | HEART RATE: 88 BPM | TEMPERATURE: 98.2 F

## 2020-03-30 PROBLEM — Z72.89 SELF-INJURIOUS BEHAVIOR: Status: ACTIVE | Noted: 2017-07-28

## 2020-03-30 PROCEDURE — G0177 OPPS/PHP; TRAIN & EDUC SERV: HCPCS

## 2020-03-30 PROCEDURE — 12400001 ZZH R&B MH UMMC

## 2020-03-30 PROCEDURE — 99238 HOSP IP/OBS DSCHRG MGMT 30/<: CPT | Mod: GC | Performed by: PSYCHIATRY & NEUROLOGY

## 2020-03-30 PROCEDURE — 99221 1ST HOSP IP/OBS SF/LOW 40: CPT | Performed by: PHYSICIAN ASSISTANT

## 2020-03-30 PROCEDURE — 99207 ZZC CONSULT E&M CHANGED TO INITIAL LEVEL: CPT | Performed by: PHYSICIAN ASSISTANT

## 2020-03-30 PROCEDURE — 25000132 ZZH RX MED GY IP 250 OP 250 PS 637: Performed by: PSYCHIATRY & NEUROLOGY

## 2020-03-30 RX ORDER — ACETAMINOPHEN 325 MG/1
650 TABLET ORAL EVERY 4 HOURS PRN
Status: DISCONTINUED | OUTPATIENT
Start: 2020-03-30 | End: 2020-03-30

## 2020-03-30 RX ORDER — DOCOSANOL 100 MG/G
CREAM TOPICAL
Status: DISCONTINUED | OUTPATIENT
Start: 2020-03-30 | End: 2020-03-30 | Stop reason: HOSPADM

## 2020-03-30 RX ORDER — HEPARIN SODIUM,PORCINE 10 UNIT/ML
5-10 VIAL (ML) INTRAVENOUS
Status: DISCONTINUED | OUTPATIENT
Start: 2020-03-30 | End: 2020-03-30 | Stop reason: HOSPADM

## 2020-03-30 RX ORDER — ONDANSETRON 4 MG/1
4 TABLET, ORALLY DISINTEGRATING ORAL EVERY 6 HOURS PRN
Status: DISCONTINUED | OUTPATIENT
Start: 2020-03-30 | End: 2020-03-30 | Stop reason: HOSPADM

## 2020-03-30 RX ORDER — VITAMIN B COMPLEX
2000 TABLET ORAL DAILY
Status: DISCONTINUED | OUTPATIENT
Start: 2020-03-30 | End: 2020-03-30 | Stop reason: HOSPADM

## 2020-03-30 RX ORDER — BISACODYL 10 MG
10 SUPPOSITORY, RECTAL RECTAL DAILY PRN
Status: DISCONTINUED | OUTPATIENT
Start: 2020-03-30 | End: 2020-03-30 | Stop reason: HOSPADM

## 2020-03-30 RX ORDER — ALBUTEROL SULFATE 90 UG/1
2 AEROSOL, METERED RESPIRATORY (INHALATION) 4 TIMES DAILY PRN
Status: DISCONTINUED | OUTPATIENT
Start: 2020-03-30 | End: 2020-03-30

## 2020-03-30 RX ORDER — DESVENLAFAXINE 50 MG/1
100 TABLET, FILM COATED, EXTENDED RELEASE ORAL EVERY MORNING
Status: DISCONTINUED | OUTPATIENT
Start: 2020-03-30 | End: 2020-03-30 | Stop reason: HOSPADM

## 2020-03-30 RX ORDER — HEPARIN SODIUM (PORCINE) LOCK FLUSH IV SOLN 100 UNIT/ML 100 UNIT/ML
5 SOLUTION INTRAVENOUS
Status: DISCONTINUED | OUTPATIENT
Start: 2020-03-30 | End: 2020-03-30 | Stop reason: HOSPADM

## 2020-03-30 RX ORDER — DOCOSANOL 100 MG/G
CREAM TOPICAL
Status: DISCONTINUED | OUTPATIENT
Start: 2020-03-30 | End: 2020-03-30

## 2020-03-30 RX ORDER — GABAPENTIN 600 MG/1
600 TABLET ORAL 3 TIMES DAILY
Status: DISCONTINUED | OUTPATIENT
Start: 2020-03-30 | End: 2020-03-30 | Stop reason: HOSPADM

## 2020-03-30 RX ORDER — OLANZAPINE 10 MG/2ML
10 INJECTION, POWDER, FOR SOLUTION INTRAMUSCULAR
Status: DISCONTINUED | OUTPATIENT
Start: 2020-03-30 | End: 2020-03-30 | Stop reason: HOSPADM

## 2020-03-30 RX ORDER — TRAZODONE HYDROCHLORIDE 50 MG/1
50 TABLET, FILM COATED ORAL
Status: DISCONTINUED | OUTPATIENT
Start: 2020-03-30 | End: 2020-03-30 | Stop reason: HOSPADM

## 2020-03-30 RX ORDER — CLONIDINE HYDROCHLORIDE 0.1 MG/1
0.1 TABLET ORAL 2 TIMES DAILY
Status: DISCONTINUED | OUTPATIENT
Start: 2020-03-30 | End: 2020-03-30 | Stop reason: HOSPADM

## 2020-03-30 RX ORDER — CLONIDINE HYDROCHLORIDE 0.1 MG/1
0.1 TABLET ORAL ONCE
Status: DISCONTINUED | OUTPATIENT
Start: 2020-03-30 | End: 2020-03-30 | Stop reason: HOSPADM

## 2020-03-30 RX ORDER — BUSPIRONE HYDROCHLORIDE 10 MG/1
10 TABLET ORAL 2 TIMES DAILY
Status: DISCONTINUED | OUTPATIENT
Start: 2020-03-30 | End: 2020-03-30 | Stop reason: HOSPADM

## 2020-03-30 RX ORDER — OLANZAPINE 10 MG/1
10 TABLET ORAL
Status: DISCONTINUED | OUTPATIENT
Start: 2020-03-30 | End: 2020-03-30 | Stop reason: HOSPADM

## 2020-03-30 RX ORDER — LIDOCAINE 40 MG/G
CREAM TOPICAL
Status: DISCONTINUED | OUTPATIENT
Start: 2020-03-30 | End: 2020-03-30 | Stop reason: HOSPADM

## 2020-03-30 RX ORDER — TOPIRAMATE 100 MG/1
100 TABLET, FILM COATED ORAL AT BEDTIME
Status: DISCONTINUED | OUTPATIENT
Start: 2020-03-30 | End: 2020-03-30 | Stop reason: HOSPADM

## 2020-03-30 RX ORDER — METFORMIN HCL 500 MG
500 TABLET, EXTENDED RELEASE 24 HR ORAL
Status: DISCONTINUED | OUTPATIENT
Start: 2020-03-30 | End: 2020-03-30 | Stop reason: HOSPADM

## 2020-03-30 RX ORDER — BENZOCAINE/MENTHOL 6 MG-10 MG
LOZENGE MUCOUS MEMBRANE 2 TIMES DAILY
Status: DISCONTINUED | OUTPATIENT
Start: 2020-03-30 | End: 2020-03-30

## 2020-03-30 RX ORDER — ALUMINA, MAGNESIA, AND SIMETHICONE 2400; 2400; 240 MG/30ML; MG/30ML; MG/30ML
30 SUSPENSION ORAL EVERY 4 HOURS PRN
Status: DISCONTINUED | OUTPATIENT
Start: 2020-03-30 | End: 2020-03-30 | Stop reason: HOSPADM

## 2020-03-30 RX ORDER — BUSPIRONE HYDROCHLORIDE 10 MG/1
10 TABLET ORAL 3 TIMES DAILY
Status: DISCONTINUED | OUTPATIENT
Start: 2020-03-30 | End: 2020-03-30

## 2020-03-30 RX ORDER — HYDROXYZINE HYDROCHLORIDE 25 MG/1
25 TABLET, FILM COATED ORAL EVERY 4 HOURS PRN
Status: DISCONTINUED | OUTPATIENT
Start: 2020-03-30 | End: 2020-03-30

## 2020-03-30 RX ORDER — HEPARIN SODIUM,PORCINE 10 UNIT/ML
5-10 VIAL (ML) INTRAVENOUS EVERY 24 HOURS
Status: DISCONTINUED | OUTPATIENT
Start: 2020-03-30 | End: 2020-03-30 | Stop reason: HOSPADM

## 2020-03-30 RX ORDER — HYDROXYZINE HYDROCHLORIDE 50 MG/1
50 TABLET, FILM COATED ORAL 3 TIMES DAILY PRN
Status: DISCONTINUED | OUTPATIENT
Start: 2020-03-30 | End: 2020-03-30 | Stop reason: HOSPADM

## 2020-03-30 RX ADMIN — DESVENLAFAXINE SUCCINATE 100 MG: 50 TABLET, EXTENDED RELEASE ORAL at 07:21

## 2020-03-30 RX ADMIN — MELATONIN 2000 UNITS: at 07:10

## 2020-03-30 RX ADMIN — BUSPIRONE HYDROCHLORIDE 10 MG: 10 TABLET ORAL at 07:10

## 2020-03-30 RX ADMIN — CLONIDINE HYDROCHLORIDE 0.1 MG: 0.1 TABLET ORAL at 07:10

## 2020-03-30 RX ADMIN — GABAPENTIN 600 MG: 600 TABLET, FILM COATED ORAL at 14:39

## 2020-03-30 RX ADMIN — GABAPENTIN 600 MG: 600 TABLET, FILM COATED ORAL at 07:10

## 2020-03-30 ASSESSMENT — ACTIVITIES OF DAILY LIVING (ADL)
LAUNDRY: WITH SUPERVISION
HYGIENE/GROOMING: INDEPENDENT
ORAL_HYGIENE: INDEPENDENT
DRESS: INDEPENDENT

## 2020-03-30 NOTE — CONSULTS
[facility], [place]  Consult Note - Hospitalist Service     Date of Admission: 3/29/2020  Consult Requested by: Dr. Arias  Reason for Consult: Possible COVID    Assessment & Plan   Nevin Alvarado is a 29 year old female with a history of recurrent foreign body ingestion, provoked PE, morbid obesity, borderline personality disorder, ADD, PTSD, depression, anxiety, rectal foreign body insertion, and peripheral neuropathy who was admitted to inpatient psychiatry after ingestion of a paper clip on 3/29/20 requiring EGD for removal.    #Recurrent Foreign Body Ingestion - S/p multiple EGDs. Complicated by prior perforation. Most recently swallowed paper clip on 3/29/20 s/p EGD with removal. GI recommended inpatient psychiatry admission & ethics consult given potential fatality of condition and limited PPE/OR resources in setting of COVID-19.  - Management per Psychiatry. Strongly recommend 1:1 monitoring for duration of admission to avoid recurrent ingestions.    #Borderline Personality Disorder, ADD, Anxiety, Depression, PTSD - On Pristiq, Buspar, Clonidine, Gabapentin, and Latuda PTA.  - Management per Psychiatry.    #Hx of PE - Diagnosed in 12/2019. Provoked due to surgery and hospitalizations with prolonged immobilization. Completed 3 month course of Apixaban.   - Notify Medicine if hypoxia, SOB, or LE edema develop.    #Morbid Obesity - BMI 46. Weight 255 lbs.  - Continue PTA Topamax and Metformin for weight loss    #Peripheral Neuropathy   - Continue PTA Gabapentin.    At this time, patient has no symptoms of COVD-19 and does not require screening or isolation. Please notify our team if symptoms concerning for COVID-19 develop (fever, cough, SOB).    Of note, patient's port-a-cath was left accessed, orders placed for medical flyer RN to de-access. Psychiatry RN aware.    Medicine will sign off. Please contact the on-call MARYCRUZ for any intercurrent medical issues which  arise.    Deepali Melissa PA-C  [facility]  ______________________________________________________________________    Chief Complaint   Intentional Foreign Body Ingestion    History is obtained from the patient and electronic health record    History of Present Illness   Nevin Alvarado is a 29 year old female with a history of recurrent foreign body ingestion, provoked PE, morbid obesity, borderline personality disorder, ADD, PTSD, depression, anxiety, rectal foreign body insertion, and peripheral neuropathy who was admitted to inpatient psychiatry after ingestion of a paper clip on 3/29/20 requiring EGD for removal. GI concerned about recurrent ingestions with 2 prior perforations and multiple EGDs with limited PPE and OR resources in setting of COVID-19 pandemic and requested inpatient psychaitry admission with ethics consult.    Internal Medicine was consulted today due to concern for COVID-19 which was triggered after patient endorsed scratchy throat following EGD. Currently, patient is afebrile without cough or SOB. Scratchy throat has resolved. She also denies chest pain, n/v/c/d, abdominal pain, blood in the stools, or urinary symptoms.      Review of Systems   The 10 point Review of Systems is negative other than noted in the HPI or here.     Past Medical History    I have reviewed this patient's medical history and updated it with pertinent information if needed.   Past Medical History:   Diagnosis Date     ADD (attention deficit disorder)      Anorexia nervosa with bulimia     history of; on Topamax     Anxiety      Borderline personality disorder (H)      Depression      H/O self-harm      History of pulmonary embolism 12/2019    Provoked. Completed 3 month course of Apixaban     Morbid obesity (H)      Neuropathy      PTSD (post-traumatic stress disorder)      Rectal foreign body - Recurrent issue, self placed      Swallowed foreign body - Recurrent issue, self placed         Past Surgical History   I have reviewed this patient's surgical history and updated it with pertinent information if needed.  Past Surgical History:   Procedure Laterality Date     COMBINED ESOPHAGOSCOPY, GASTROSCOPY, DUODENOSCOPY (EGD), REPLACE ESOPHAGEAL STENT N/A 10/9/2019    Procedure: Upper Endoscopy with Suture Placement;  Surgeon: Zurdo Ramirez MD;  Location: [facility]     ESOPHAGOSCOPY, GASTROSCOPY, DUODENOSCOPY (EGD), COMBINED N/A 3/9/2017    Procedure: COMBINED ESOPHAGOSCOPY, GASTROSCOPY, DUODENOSCOPY (EGD), REMOVE FOREIGN BODY;  Surgeon: Avis Guzmán MD;  Location: [facility]     ESOPHAGOSCOPY, GASTROSCOPY, DUODENOSCOPY (EGD), COMBINED N/A 4/20/2017    Procedure: COMBINED ESOPHAGOSCOPY, GASTROSCOPY, DUODENOSCOPY (EGD), REMOVE FOREIGN BODY;  EGD removal Foregin body;  Surgeon: Lokesh Paula MD;  [facility]     ESOPHAGOSCOPY, GASTROSCOPY, DUODENOSCOPY (EGD), COMBINED N/A 6/12/2017    Procedure: COMBINED ESOPHAGOSCOPY, GASTROSCOPY, DUODENOSCOPY (EGD);  COMBINED ESOPHAGOSCOPY, GASTROSCOPY, DUODENOSCOPY (EGD) [[id number]]attempted removal of foreign body;  Surgeon: Pamela Perez MD;  [facility]     ESOPHAGOSCOPY, GASTROSCOPY, DUODENOSCOPY (EGD), COMBINED N/A 6/9/2017    Procedure: COMBINED ESOPHAGOSCOPY, GASTROSCOPY, DUODENOSCOPY (EGD), REMOVE FOREIGN BODY;  Esophagoscopy, Gastroscopy, Duodenoscopy, Removal of Foreign Body;  Surgeon: Dejon Marsh MD;  [facility]     ESOPHAGOSCOPY, GASTROSCOPY, DUODENOSCOPY (EGD), COMBINED N/A 1/6/2018    Procedure: COMBINED ESOPHAGOSCOPY, GASTROSCOPY, DUODENOSCOPY (EGD), REMOVE FOREIGN BODY;  COMBINED ESOPHAGOSCOPY, GASTROSCOPY, DUODENOSCOPY (EGD) [by pascal net and snare with profol sedation;  Surgeon: Feliciano Emmanuel MD;  Location:  GI     ESOPHAGOSCOPY, GASTROSCOPY, DUODENOSCOPY (EGD), COMBINED N/A 3/19/2018    Procedure: COMBINED ESOPHAGOSCOPY, GASTROSCOPY, DUODENOSCOPY (EGD), REMOVE FOREIGN BODY;    Esophagodscopy, Gastroscopy, Duodenoscopy,Foreign Body Removal;  Surgeon: Brice Guzmán MD;  [facility]     ESOPHAGOSCOPY, GASTROSCOPY, DUODENOSCOPY (EGD), COMBINED N/A 4/16/2018    Procedure: COMBINED ESOPHAGOSCOPY, GASTROSCOPY, DUODENOSCOPY (EGD), REMOVE FOREIGN BODY;  Esophagogastroduodenoscopy  Foreign Body Removal X 2;  Surgeon: Royer Moise MD;  [facility]     ESOPHAGOSCOPY, GASTROSCOPY, DUODENOSCOPY (EGD), COMBINED N/A 6/1/2018    Procedure: COMBINED ESOPHAGOSCOPY, GASTROSCOPY, DUODENOSCOPY (EGD), REMOVE FOREIGN BODY;  COMBINED ESOPHAGOSCOPY, GASTROSCOPY, DUODENOSCOPY with removal of foreign body, propofol sedation by anesthesia;  Surgeon: Brice Martinez MD;  Location:  GI     ESOPHAGOSCOPY, GASTROSCOPY, DUODENOSCOPY (EGD), COMBINED N/A 7/25/2018    Procedure: COMBINED ESOPHAGOSCOPY, GASTROSCOPY, DUODENOSCOPY (EGD), REMOVE FOREIGN BODY;;  Surgeon: Candy Castelan MD;  Location:  GI     ESOPHAGOSCOPY, GASTROSCOPY, DUODENOSCOPY (EGD), COMBINED N/A 7/28/2018    Procedure: COMBINED ESOPHAGOSCOPY, GASTROSCOPY, DUODENOSCOPY (EGD), REMOVE FOREIGN BODY;  COMBINED ESOPHAGOSCOPY, GASTROSCOPY, DUODENOSCOPY (EGD), REMOVE FOREIGN BODY;  Surgeon: Brice Guzmán MD;  [facility]     ESOPHAGOSCOPY, GASTROSCOPY, DUODENOSCOPY (EGD), COMBINED N/A 7/31/2018    Procedure: COMBINED ESOPHAGOSCOPY, GASTROSCOPY, DUODENOSCOPY (EGD);  COMBINED ESOPHAGOSCOPY, GASTROSCOPY, DUODENOSCOPY (EGD) TO REMOVE FOREIGN BODY;  Surgeon: Lokesh Paula MD;  [facility]     ESOPHAGOSCOPY, GASTROSCOPY, DUODENOSCOPY (EGD), COMBINED N/A 8/4/2018    Procedure: COMBINED ESOPHAGOSCOPY, GASTROSCOPY, DUODENOSCOPY (EGD), REMOVE FOREIGN BODY;   combined esophagoscopy, gastroscopy, duodenoscopy, REMOVE FOREIGN BODY ;  Surgeon: Lokesh Paula MD;  [facility]     ESOPHAGOSCOPY, GASTROSCOPY, DUODENOSCOPY (EGD), COMBINED N/A 10/6/2019    Procedure: ESOPHAGOGASTRODUODENOSCOPY (EGD) with  fireign body removal x2, esophageal stent placement with floroscopy;  Surgeon: Timoteo Espana MD;  [facility]     ESOPHAGOSCOPY, GASTROSCOPY, DUODENOSCOPY (EGD), COMBINED N/A 12/2/2019    Procedure: Esophagogastroduodenoscopy with esophageal stent removal, esophogram;  Surgeon: Kailee Tena MD;  [facility]     ESOPHAGOSCOPY, GASTROSCOPY, DUODENOSCOPY (EGD), COMBINED N/A 12/17/2019    Procedure: ESOPHAGOGASTRODUODENOSCOPY, WITH FOREIGN BODY REMOVAL;  Surgeon: Pamela Perez MD;  [facility]     ESOPHAGOSCOPY, GASTROSCOPY, DUODENOSCOPY (EGD), COMBINED N/A 12/13/2019    Procedure: ESOPHAGOGASTRODUODENOSCOPY, WITH FOREIGN BODY REMOVAL;  Surgeon: Samia Stanton MD;  [facility]     ESOPHAGOSCOPY, GASTROSCOPY, DUODENOSCOPY (EGD), COMBINED N/A 12/28/2019    Procedure: ESOPHAGOGASTRODUODENOSCOPY (EGD) Removal of Foreign Body X 2;  Surgeon: Huy Kelley MD;  [facility]     ESOPHAGOSCOPY, GASTROSCOPY, DUODENOSCOPY (EGD), COMBINED N/A 1/5/2020    Procedure: ESOPHAGOGASTRODUOENOSCOPY WITH FOREIGN BODY REMOVAL;  Surgeon: Pamela Perez MD;  [facility]     ESOPHAGOSCOPY, GASTROSCOPY, DUODENOSCOPY (EGD), COMBINED N/A 1/3/2020    Procedure: ESOPHAGOGASTRODUODENOSCOPY (EGD) REMOVAL OF FOREIGN BODY.;  Surgeon: Pamela Perez MD;  [facility]     ESOPHAGOSCOPY, GASTROSCOPY, DUODENOSCOPY (EGD), COMBINED N/A 1/13/2020    Procedure: ESOPHAGOGASTRODUODENOSCOPY (EGD) for foreign body removal;  Surgeon: Lokesh Paula MD;  [facility]     ESOPHAGOSCOPY, GASTROSCOPY, DUODENOSCOPY (EGD), COMBINED N/A 1/18/2020    Procedure: Diagnostic ESOPHAGOGASTRODUODENOSCOPY (EGD;  Surgeon: Lokesh Paula MD;  [facility]     EXAM UNDER ANESTHESIA ANUS N/A 1/10/2017    Procedure: EXAM UNDER ANESTHESIA ANUS;  Surgeon: Annmarie Haynes MD;  [facility]     EXAM UNDER ANESTHESIA RECTUM N/A 7/19/2018    Procedure: EXAM UNDER  ANESTHESIA RECTUM;  EXAM UNDER ANESTHESIA, REMOVAL OF RECTAL FOREIGN BODY;  Surgeon: Annmarie Haynes MD;  [facility]     HC REMOVE FECAL IMPACTION OR FB W ANESTHESIA N/A 12/18/2016    Procedure: REMOVE FECAL IMPACTION/FOREIGN BODY UNDER ANESTHESIA;  Surgeon: Soham Cano MD;  Location:  OR     KNEE SURGERY - removed a small tissue mass from knee Right      LAPAROSCOPIC ABLATION ENDOMETRIOSIS       LAPAROTOMY EXPLORATORY N/A 1/10/2017    Procedure: LAPAROTOMY EXPLORATORY;  Surgeon: Annmarie Haynes MD;  [facility]: [facility]     LAPAROTOMY EXPLORATORY  09/11/2019    Manual manipulation of bowel to remove pill bottle in rectum     lymph nodes removed from neck; benign  age 7     MAMMOPLASTY REDUCTION Bilateral      RELEASE CARPAL TUNNEL Bilateral      SIGMOIDOSCOPY FLEXIBLE N/A 1/10/2017    Procedure: SIGMOIDOSCOPY FLEXIBLE;  Surgeon: Annmarie Haynes MD;  [facility]     SIGMOIDOSCOPY FLEXIBLE N/A 5/8/2018    Procedure: SIGMOIDOSCOPY FLEXIBLE;  flex sig with foreign body removal using snare and rattooth forcep;  Surgeon: Soham Cano MD;  Location:  GI     SIGMOIDOSCOPY FLEXIBLE N/A 2/20/2019    Procedure: Exam under anesthesia Colonoscopy with attempted  removal of rectal foreign body;  Surgeon: Estrada Chávez MD;  Location: [facility]       Social History   I have reviewed this patient's social history and updated it with pertinent information if needed.  Social History     Tobacco Use     Smoking status: Never Smoker     Smokeless tobacco: Never Used   Substance Use Topics     Alcohol use: No     Alcohol/week: 0.0 standard drinks     Drug use: No       Family History   I have reviewed this patient's family history and updated it with pertinent information if needed.   Family History   Problem Relation Age of Onset     Diabetes Type 2  Maternal Grandmother      Diabetes Type 2  Paternal Grandmother      Breast Cancer Paternal Grandmother      Cerebrovascular Disease Father  53        Medications   Medications Prior to Admission   Medication Sig Dispense Refill Last Dose     acetaminophen (TYLENOL) 32 mg/mL liquid Take 31.25 mLs (1,000 mg) by mouth every 6 hours as needed for fever or pain 355 mL 0      busPIRone (BUSPAR) 10 MG tablet Take 1 tablet (10 mg) by mouth twice daily        Cholecalciferol (VITAMIN D) 50 MCG (2000 UT) CAPS Take 2,000 Units by mouth daily         cloNIDine (CATAPRES) 0.1 MG tablet Take 0.1 mg by mouth 2 times daily         desvenlafaxine (PRISTIQ) 100 MG 24 hr tablet Take 1 tablet (100 mg) by mouth every morning        docosanol (ABREVA) 10 % CREA cream Apply to lip 5 times a day as soon as symptoms begin, do not use for more than 10 days. Used PRN.        gabapentin (NEURONTIN) 600 MG tablet Take 600 mg by mouth 3 times daily         hydrOXYzine (ATARAX) 50 MG tablet Take 1 tablet (50 mg) by mouth 3 times daily as needed for anxiety 30 tablet 0      levonorgestrel (MIRENA) 20 MCG/24HR IUD 1 each by Intrauterine route once        lurasidone (LATUDA) 60 MG TABS tablet Take 1 tablet (60 mg) by mouth at bedtime        metFORMIN (GLUCOPHAGE-XR) 500 MG 24 hr tablet Take 1 tablet (500 mg) by mouth daily        ondansetron (ZOFRAN-ODT) 4 MG ODT tab Take 4 mg by mouth every 6 hours as needed for nausea or vomiting         topiramate (TOPAMAX) 100 MG tablet Take 1 tablet (100 mg) by mouth daily at bedtime          Allergies   Allergies   Allergen Reactions     Amoxicillin-Pot Clavulanate Other (See Comments), Rash and Swelling     PN: facial swelling, left side. Also had cortisone injection the same day as she started the Augmentin.  PN: facial swelling, left side. Also had cortisone injection the same day as she started the Augmentin.  Noted in downtime recovery of chart.       Penicillins Anaphylaxis     Vancomycin Swelling, Itching and Rash     Other reaction(s): Redness  Flushed face, very itchy; HUT Comment: Flushed face, very itchy; HUT Reaction: Angioedema; HUT  Reaction: Redness; HUT Severity: Med; HUT Noted: [id number]  Flushed face, very itchy  Flushed face, very itchy  Flushed face, very itchy       Hydrocodone Nausea and Vomiting and GI Disturbance     vomiting for days  vomiting for days  vomiting for days  vomiting for days, PN: vomiting for days  vomiting for days  vomiting for days  vomiting for days  vomiting for days  vomiting for days  vomiting for days, PN: vomiting for days  vomiting for days  vomiting for days  vomiting for days  vomiting for days  ; HUT Comment: vomiting for days; HUT Reaction: Gastrointestinal; HUT Reaction: Nausea And Vomiting; HUT Reaction Type: Intolerance; HUT Severity: Med; HUT Noted: [id number]  vomiting for days       Blood-Group Specific Substance Other (See Comments)     Patient has an anti-Cw and non-specific antibodies. Blood product orders may be delayed. Draw one red top and two purple top tubes for all type/screen/crossmatch orders.     Influenza Vaccines Other (See Comments)     Oseltamivir Hives     med stopped, PN: med stopped     Cephalosporins Rash     Lamotrigine Rash     Possibly associated with Lamictal.   HUT Comment: Possibly associated with Lamictal. ; HUT Reaction: Rash; HUT Reaction Type: Allergy; HUT Severity: Low; HUT Noted: [id number]     Latex Rash       Physical Exam   Vital Signs: Temp: 98.2  F (36.8  C) Temp src: Oral BP: (!) 128/97 Pulse: 88   Resp: 18 SpO2: 98 % O2 Device: None (Room air) Weight: 255 lbs 0 oz  General: Awake. Non-toxic appearing. NAD.  CV: RRR. No appreciable murmurs.  Respiratory: Normal effort on RA. Lungs CTAB.  GI: Soft, non-tender, and non-distended with bowel sounds present.  Extremities: No peripheral edema. Warm and well perfused.  Neuro: Alert. Answers questions appropriately. Moves all extremities.  Skin: No rashes or jaundice on exposed areas.    Data   Results for orders placed or performed during the hospital encounter of 03/29/20 (from the past 24 hour(s))   XR Abdomen Port 1 View    Result Value Ref Range    Radiologist flags Foreign body ingestion (Urgent)     Narrative    XR ABDOMEN PORT 1 VW3/29/2020 4:20 PM    INDICATION: swallowed paperclip    COMPARISON:  1/18/2020    FINDINGS: AP view of the abdomen. A linear radiopaque density projects  over the mid abdomen, possibly in stomach, and measures 12.7 cm long.  Nonobstructive bowel gas pattern. No visualized pneumatosis or portal  venous gas. Lower chest is clear. No acute osseous abnormalities. A  catheter projects with tip overlying the high right atrium.      Impression    IMPRESSION: There is a linear radiopaque density projecting over the  mid abdomen which could potentially represent a straightened paperclip  given clinical history.    [Urgent Result: Foreign body ingestion]    Finding was identified on 3/29/2020 4:28 PM.     Dr. Velasquez was contacted by Dr. Pratt at 3/29/2020 4:35 PM and  verbalized understanding of the urgent finding.     I have personally reviewed the examination and initial interpretation  and I agree with the findings.    YEHUDA TROY MD   Basic metabolic panel   Result Value Ref Range    Sodium 142 133 - 144 mmol/L    Potassium 3.4 3.4 - 5.3 mmol/L    Chloride 110 (H) 94 - 109 mmol/L    Carbon Dioxide 22 20 - 32 mmol/L    Anion Gap 9 3 - 14 mmol/L    Glucose 122 (H) 70 - 99 mg/dL    Urea Nitrogen 8 7 - 30 mg/dL    Creatinine 0.58 0.52 - 1.04 mg/dL    GFR Estimate >90 >60 mL/min/[1.73_m2]    GFR Estimate If Black >90 >60 mL/min/[1.73_m2]    Calcium 8.6 8.5 - 10.1 mg/dL   CBC with platelets differential   Result Value Ref Range    WBC 9.7 4.0 - 11.0 10e9/L    RBC Count 4.16 3.8 - 5.2 10e12/L    Hemoglobin 11.6 (L) 11.7 - 15.7 g/dL    Hematocrit 37.2 35.0 - 47.0 %    MCV 89 78 - 100 fl    MCH 27.9 26.5 - 33.0 pg    MCHC 31.2 (L) 31.5 - 36.5 g/dL    RDW 13.9 10.0 - 15.0 %    Platelet Count 310 150 - 450 10e9/L    Diff Method Automated Method     % Neutrophils 74.4 %    % Lymphocytes 17.0 %    % Monocytes 5.5 %    %  Eosinophils 2.3 %    % Basophils 0.4 %    % Immature Granulocytes 0.4 %    Nucleated RBCs 0 0 /100    Absolute Neutrophil 7.2 1.6 - 8.3 10e9/L    Absolute Lymphocytes 1.7 0.8 - 5.3 10e9/L    Absolute Monocytes 0.5 0.0 - 1.3 10e9/L    Absolute Eosinophils 0.2 0.0 - 0.7 10e9/L    Absolute Basophils 0.0 0.0 - 0.2 10e9/L    Abs Immature Granulocytes 0.0 0 - 0.4 10e9/L    Absolute Nucleated RBC 0.0    UPPER GI ENDOSCOPY   Result Value Ref Range    Upper GI Endoscopy       [address]., [place] [id number] [phone]     Endoscopy Department  _______________________________________________________________________________  Patient Name: Nevin Alvarado     Procedure Date: 3/29/2020 6:59 PM  MRN: [id number]                       Account Number: [id number]  YOB: 1990             Admit Type: Emergency Department  Age: 29                                Gender: Female  Note Status: Finalized                Attending MD: Doug Hansen MD  Total Sedation Time:                    _______________________________________________________________________________     Procedure:           Upper GI endoscopy  Indications:         Foreign body in the stomach  Providers:           Doug Hansen MD, Marianna Perez  Referring MD:        Jeffy Velasquez  Medicines:           General Anesthesia  Complications:       No immediate complications.  ________________________________________________________ _______________________  Procedure:           Pre-Anesthesia Assessment:                       - Airway Examination: Mallampati Class IV (tongue                        obstructs view of the soft palate).                       - Respiratory Examination: clear to auscultation.                       - CV Examination: regular rate and rhythm.                       - ASA Grade Assessment: II - A patient with mild                        systemic disease.                       -  After reviewing the risks and benefits, the patient                        was deemed in satisfactory condition to undergo the                        procedure.                       - The anesthesia plan was to use general anesthesia.                       - Immediately prior to administration of medications,                        the patient was re-assessed for adequacy to receive                        sedatives.                       - The heart rate, respiratory rate, oxygen saturations,                        b lood pressure, adequacy of pulmonary ventilation, and                        response to care were monitored throughout the procedure.                       - The physical status of the patient was re-assessed                        after the procedure.                       After obtaining informed consent, the endoscope was                        passed under direct vision. Throughout the procedure,                        the patient's blood pressure, pulse, and oxygen                        saturations were monitored continuously. The Endoscope                        was introduced through the mouth, and advanced to the                        duodenum. The upper GI endoscopy was accomplished                        without difficulty. The patient tolerated the procedure                        well.                                                                                   Findings:       Mild heme noted in mid-esophagus during scope insertion.       Approximately 10 cm metal r od (straightened out paper clip) was partly        embedded into the body of the stomach with the other end near pylorus        (loose). There was moderate amount of food present as well. Initially,        an overtube was placed, but it proved to be too short. Next, the        endoscope was equipped with a hat and the yang was taken out using        forceps uneventfully. The puncture wound in the body of the stomach         appeared very shallow and there is no suspicion for perforation. The        stomach and esophagus were once again examined after the foreign body        was removed and no worrisome lesions were noted.       The duodenal bulb was normal.                                                                                   Impression:          - Metal yang was taken out from the stomach.                       - Minor abrasion noted in the esophagus, likely related                        to the initial swallowing.                       - Normal duodenal bulb.                        - No specimens collected.  Recommendation:      - Observe patient's clinical course.                       - My recommendation is to admit the patient to                        psychiatry after she recovers in [facility]. The patient's                        overall prognosis is grim. She already had two                        perforations associated with foreign body ingestions.                        This is a fatal condition, unless the behavior is                        controlled. Her situation needs to be reassessed. At the                        very minimum we need to make sure she does not continue                        swallowing while we are in the period of limited PPE                        resources due to COVID-19 situation when all semi-urgent                        procedures are being held for that period of time.                                                                                     Signed Electronically by Doug Hansen MD  _____________________  A geri Hansen MD  3/29/2020 7:59:10 PM  I was physically present for the entire viewing portion of the exam.  __________________________  Signature of teaching physician  B4alicia/Arjun Hansen MD  Number of Addenda: 0    Note Initiated On: 3/29/2020 6:59 PM  Scope In:  Scope Out:

## 2020-03-30 NOTE — PROGRESS NOTES
Writer called the patient's legal guardian Marianna ([phone]). Writer informed Marianna that the clinical team feels that the patient is psychiatrically cleared for discharge. Marianna stated that she agreed and provided writer with the contact information for Cheryl, the , to see if she would be able to pick the patient up from the hospital for discharge.     Writer called Cheryl ([phone]) the  at the patient's place of residence. Cheryl did not answer the phone and her voicemail was full.     Writer spoke with the patient this morning to complete her initial psychosocial and plan for discharge. The patient reported that she is feeling ready for discharge and has appointments already in place, including outpatient psychiatry. Writer informed the patient that she did speak with her legal guardian who is in agreeance with the discharge. Writer also informed the RN. Writer will schedule a medical cab for discharge today.     The patient has been scheduled for pickup in the front of the [facility] today at 4:30pm. The patient is being picked up [employer]. The appointment number is [phone]. The RN has been informed.     Writer called the patient's , Solange Quezada ([phone]) to inform her about the patient's discharge. She was in agreeance with this and asked that the clinical team re-iterate to the patient that she has supports she can reach out to for help when she is having these urges. The patient's AVS has been completed for discharge.

## 2020-03-30 NOTE — PLAN OF CARE
BEHAVIORAL TEAM DISCUSSION    Participants: Dr. Ledesma, Dr. Granados, Kai Narvaez ([facility])  Progress: Continuing to assess.   Anticipated length of stay: 1-4 days.   Continued Stay Criteria/Rationale: Assessment, evaluation, and recommendations.   Medical/Physical: Hx of Anorexia, Hx of rectal foreign object insertion, Hx of foreign object ingestion.   Precautions:   Behavioral Orders   Procedures     Code 1 - Restrict to Unit     Routine Programming     As clinically indicated     Self Injury Precaution     Single Room     Status 15     Every 15 minutes.     Status Individual Observation     Recurrent  foreign body ingestion and rectal foreign body insertion  Female only.     Order Specific Question:   CONTINUOUS 24 hours / day     Answer:   5 feet     Order Specific Question:   Indications for SIO     Answer:   Self-injury risk     Order Specific Question:   Indications for SIO     Answer:   Suicide risk     Suicide precautions     Patients on Suicide Precautions should have a Combination Diet ordered that includes a Diet selection(s) AND a Behavioral Tray selection for Safe Tray - with utensils, or Safe Tray - NO utensils       Plan: The plan is to assess the patient for mental health and medication needs. The patient will be prescribed medications to treat the identified symptoms. Patient will participate in therapeutic skill building groups on the unit. [facility] to coordinate discharge/after care planning.     Rationale for change in precautions or plan: None.

## 2020-03-30 NOTE — ANESTHESIA POSTPROCEDURE EVALUATION
Anesthesia POST Procedure Evaluation    Patient: Nevin Alvarado   MRN:     [id number] Gender:   female   Age:    29 year old :      1990        Preoperative Diagnosis: Ingestion of foreign body [T18.9XXA]   Procedure(s):  UPPER ENDOSCOPY WITH FOREIGN BODY REMOVAL   Postop Comments: No value filed.     Anesthesia Type: General       Disposition: Admission   Postop Pain Control: Uneventful            Sign Out: Well controlled pain   PONV: No   Neuro/Psych: Uneventful            Sign Out: Acceptable/Baseline neuro status   Airway/Respiratory: Uneventful            Sign Out: AIRWAY IN SITU/Resp. Support   CV/Hemodynamics: Uneventful            Sign Out: Acceptable CV status   Other NRE: NONE   DID A NON-ROUTINE EVENT OCCUR? No         Last Anesthesia Record Vitals:  CRNA VITALS  3/29/2020 1901 - 3/29/2020 1953      3/29/2020             NIBP:  138/70    Pulse:  95    SpO2:  98 %    Resp Rate (observed):  (!) 1          Last PACU Vitals:  Vitals Value Taken Time   /70 3/29/2020  7:45 PM   Temp     Pulse     Resp 20 3/29/2020  7:45 PM   SpO2 98 % 3/29/2020  7:45 PM   Temp src     NIBP     Pulse     SpO2     Resp     Temp     Ht Rate     Temp 2           Electronically Signed By: Reece Mendez MD, 2020, 7:53 PM

## 2020-03-30 NOTE — H&P
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 The anesthesia plan was to use general anesthesia.                       - Immediately prior to administration of medications,                        the patient was re-assessed for adequacy to receive                        sedatives.                       - The heart rate, respiratory rate, oxygen saturations,                        b lood pressure, adequacy of pulmonary ventilation, and                        response to care were monitored throughout the procedure.                       - The physical status of the patient was re-assessed                        after the procedure.                       After obtaining informed consent, the endoscope was                        passed under direct vision. Throughout the procedure,                         the patient's blood pressure, pulse, and oxygen                        saturations were monitored continuously. The Endoscope                        was introduced through the mouth, and advanced to the                        duodenum. The upper GI endoscopy was accomplished                        without difficulty. The patient tolerated the procedure                        well.                                                                                   Findings:       Mild heme noted in mid-esophagus during scope insertion.       Approximately 10 cm metal r od (straightened out paper clip) was partly        embedded into the body of the stomach with the other end near pylorus        (loose). There was moderate amount of food present as well. Initially,        an overtube was placed, but it proved to be too short. Next, the        endoscope was equipped with a hat and the yang was taken out using        forceps uneventfully. The puncture wound in the body of the stomach        appeared very shallow and there is no suspicion for perforation. The        stomach and esophagus were once again examined after the foreign body        was removed and no worrisome lesions were noted.       The duodenal bulb was normal.                                                                                   Impression:          - Metal yang was taken out from the stomach.                       - Minor abrasion noted in the esophagus, likely related                        to the initial swallowing.                       - Normal duodenal bulb.                        - No specimens collected.  Recommendation:      - Observe patient's clinical course.                       - My recommendation is to admit the patient to                        psychiatry after she recovers in PAR. The patient's                        overall prognosis is grim. She already had two                        perforations associated  "with foreign body ingestions.                        This is a fatal condition, unless the behavior is                        controlled. Her situation needs to be reassessed. At the                        very minimum we need to make sure she does not continue                        swallowing while we are in the period of limited PPE                        resources due to COVID-19 situation when all semi-urgent                        procedures are being held for that period of time.                                                                                     Signed Electronically by Doug Hansen MD  _____________________  A geri Hansen MD  3/29/2020 7:59:10 PM  I was physically present for the entire viewing portion of the exam.  __________________________  Signature of teaching physician  Amairani/Arjun Hansen MD  Number of Addenda: 0    Note Initiated On: 3/29/2020 6:59 PM  Scope In:  Scope Out:            Psychiatric Examination:   BP (!) 128/97   Pulse 88   Temp 98.2  F (36.8  C) (Oral)   Resp 18   Ht 1.575 m (5' 2\")   Wt 115.7 kg (255 lb)   SpO2 98%   BMI 46.64 kg/m      Appearance:  awake, alert, adequately groomed, cooperative and no apparent distress  Attitude:  cooperative  Eye Contact:  good  Mood:  anxious  Affect:  intensity is normal  Speech:  clear, coherent  Psychomotor Behavior:  no evidence of tardive dyskinesia, dystonia, or tics  Thought Process:  logical, linear and goal oriented  Associations:  no loose associations  Thought Content:  no evidence of suicidal ideation or homicidal ideation and no auditory hallucinations present  Insight:  fair  Judgment:  fair  Oriented to:  time, person, and place  Attention Span and Concentration:  intact  Recent and Remote Memory:  intact  Language:  english with appropriate syntax and vocabulary  Fund of Knowledge: appropriate  Muscle Strength and Tone: normal  Gait and Station: Normal         Physical Exam:     See ED " assessment note by ED physician on 3/29/2020        Assessment   Nevin Alvarado is a 29 year old  female with a past psychiatric history of borderline personality disorder, PTSD, CANDICE and ADHD who presented to the ED with SIB in the context of swallowing a paperclip and increased anxiety from social isolation and limited outpatient resource availibility. Significant symptoms include SIB, irritable, poor frustration tolerance and impulsive. Her last psychiatric hospitalization was in 3/2018 for foreign body ingestion.  She is currently followed by Dr. Brionna De La Rosa, at [facility]. Current psychosocial stressors include trauma, chronic mental health issues and limited access to outpatient resources due to quarentine restrictions which she has been coping with by SIB.  Patient's support system includes family and outpatient team.  Substance use does not appear to be playing a contributing role in the patient's presentation.  There is genetic loading for uspecified mental health issues in mother. Medical history does appear to be significant for s/p OD and TBI w/LOC.  The MSE is notable for anxious affect and absent SI. She denies any current self injurious behaviors or thoughts. Her reported symptoms of poor distress tolerance and self harm via foreign body ingestion are consistent with her historic diagnosis of borderline personality disorder.       Given that she currently has SIB, patient warrants inpatient psychiatric hospitalization to maintain her safety until further evaluation is completed. Disposition pending clinical stabilization, medication optimization and development of an appropriate discharge plan.    Risk for harm is moderate.  Risk factors: maladaptive coping, trauma, impulsive and past behaviors  Protective factors: family, peers and engaged in treatment     Principal psychiatric diagnosis:   - Borderline Personality Disorder     Secondary psychiatric diagnoses:   - Post  traumatic stress disorder   - Generalized Anxiety Disorder        Plan     Admit to Unit [facility] with Attending Physician Dr. Marian Ledesma M.D.    Medications:   Outpatient medications held:     None    Outpatient medications continued:   -Buspirone 10 mg twice daily  -Clonidine 0.1 mg 3 times daily  -Desvenlafaxine 100 mg every morning  -Gabapentin 600 mg 3 times daily  -Lurasidone 60 mg nightly  -Topiramate 100 mg nightly    New medications initiated:   -Olanzapine 10 mg PO/IM prn Q2H severe agitation/psychosis  -Hydroxyzine 25-50 mg Q6H PRN for anxiety  -Tylenol 650 mg Q6H PRN for pain and fever  -Mylanta 30 ml Q4H PRN for indigestion    Medications: risks/benefits discussed with guardian and patient    Patient will be treated in therapeutic milieu with appropriate individual and group therapies.    Laboratory/Imaging:  -Admission labs notable for mild hyperchloremia to 110, elevated blood glucose to 122, low hemoglobin to 11.6, and abdominal x-ray demonstrating foreign body in stomach.    Legal Status:   Orders Placed This Encounter      72 Hour Hold      Legal status 72 Hour Hold      Safety Assessment:    Behavioral Orders   Procedures     Code 1 - Restrict to Unit     Routine Programming     As clinically indicated     Self Injury Precaution     Single Room     Status 15     Every 15 minutes.     Status Individual Observation     Recurrent  foreign body ingestion and rectal foreign body insertion  Female only.     Order Specific Question:   CONTINUOUS 24 hours / day     Answer:   5 feet     Order Specific Question:   Indications for SIO     Answer:   Self-injury risk     Order Specific Question:   Indications for SIO     Answer:   Suicide risk     Suicide precautions     Patients on Suicide Precautions should have a Combination Diet ordered that includes a Diet selection(s) AND a Behavioral Tray selection for Safe Tray - with utensils, or Safe Tray - NO utensils        Pt has not required locked seclusion or  restraints in the past 24 hours to maintain safety, please refer to RN documentation for further details.    Consults:  -Gastroenterology consulted on admission and performed upper endoscopy for foreign body removal.    Medical diagnoses to be addressed this admission:     #.  Foreign body ingestion  -Retrieval and removal of the upper endoscopy         Dispo: unknown pending medication management and clinical stabilization    Patient to be staffed with the attending physician in the morning.     -------------------------------------------------------  Kaiden Granados MD  PGY-1 Psychiatry Resident    Attestation:  I, Marian Ledesma MD, have personally performed an examination of this patient and I have reviewed the resident's documentation.  I have edited the note to reflect all relevant changes.  I have discussed this patient with the house staff on 3/30/2020.  I agree with resident findings and plan in today's  resident H&P.  I have reviewed all vitals and laboratory findings.      I certifiy that the inpatient services were ordered in accordance with the Medicare regulations governing the order. This includes certification that hospital inpatient services are reasonable and necessary and in the case of services not specified as inpatient-only under 42 .22(n), that they are appropriately provided as inpatient services in accordance with the 2-midnight benchmark under 42 .3(e).     The reason for inpatient status is Self-injurious Behavior.    Marian Ledesma M.D.,Ph.D.

## 2020-03-30 NOTE — PROGRESS NOTES
The patient is a 29 year woman with history of repeated foreign body ingestions.  Today she came in after swallowing a straightened paper clip.    Informed consent was obtained from her guardian, Marianna Trujillo ([phone]).  Upper endoscopy was done to remove the paper clip, which was stuck in her stomach.  General anesthesia was used with ET intubation.  The clip was taken out without immediate complications using a simons to protect the esophagus.  Only minor mucosal abrasion was noted in the mid-esophagus and the body of the stomach caused by the clip after she swallowed it.    The patient's overall prognosis is grim.  While this individual procedure went well, she already had two perforations previously.  This will almost certain happen again.      My recommendation is admission to psychiatry and a biomedical ethics consult.

## 2020-03-30 NOTE — PROGRESS NOTES
"Pt states she is ready for discharge. A&O x4. Full-range affect. Mood is calm. Denies any pain or acute physical distress. Pt reports no guns at home. Pt's identified coping skills are \"DBT and playing with my Legos\". Pt's support system includes her guardian and . Denies SI/SIB/HI/AH/VH behaviors.     Belongings signed and accounted for. Writer and pt went over pt's future appointments. No discharge meds. Writer confirmed with cab company address and time of arrival. Pt left unit ambulatory at 1630 with staff to pickup location- no issues.   "

## 2020-03-30 NOTE — DISCHARGE SUMMARY
Psychiatric Discharge Summary    Hospital Day #0    Nevin Alvarado MRN# [id number]   Age: 29 year old YOB: 1990     Date of Admission:  3/29/2020  Date of Discharge:  3/30/2020  Admitting Physician:  Marian Ledesma MD  Discharge Physician:  Marian Ledesma MD         Event Leading to Hospitalization:   Nevin Alvarado is a 29 year old  female with a past psychiatric history of BPD, ADD, anxiety, anorexia, PTSD and recurrent FB ingestino and insertion. Admitted from the  ER on 03/30/2020 due to concern for self harm behaviors (swallowing a paperclip) in the context of social isolation,  medication adherence no substance use. Psychosocial stressors including chronic mental health issues, social isolation and inability to follow up in person with outpatient resources. The patient has had several  psychiatric hospitalizations for swallowing foreign bodies or insertion with and without intent to harm self, most recently  3/10/18 for ingestion of mickie pins. Per chart review she has required at least 11 EGDs, 2 EUAs, and 3 exploratory laparatomies since 2016. She has also had 4 overdoses in the past two years, most recently 4/17/17 with oxycodone. Since past hospitalization, the patient has had ER visits every 1-5 days including 4 in the past two weeks at [facility], [facility], and [facility]      The patient is currently not followed by MH providers. Family history non contributory. Current psychosocial stressors include loneliness, unemployment and lack of social support which she has been doing self harm. The MSE is notable for an obese  tearful female who reports depression and SIB and denies any SI/HI. The patient's reported symptoms are consistent with patient's historic diagnosis. Additionally, the patient has traits of personality disorder which interfere with her ability to adhere with the treatment plan.       PTA medications were continued at time of  "admission. Appropriate precautions were placed. Given that she is depressed and self-injurious, patient warrants inpatient psychiatric hospitalization to maintain her safety. Disposition pending clinical stabilization, medication optimization and development of an appropriate discharge plan.         Per ED Note:   This is a 28-year-old female patient coming into the emergency room after swallowing a straightened out paperclip.  This is well-known for this patient to be doing this and we are very familiar with her case here in the emergency room.  GI was consulted after an x-ray shows that there was a straightened paperclip.  They were able to successfully retrieve the paperclip with endoscopy.  They stated that there is no complications.  Their recommendation because of the current nature of the coronavirus pandemic is that she needs a psych admission with an ethics consult.  They are concerned that when she starts swallowing items that she has patterns of multiple days of doing similar issues.  They are concerned that she will put staff at risk as well as to utilize flory resources.  I discussed this with the patient and at this time she will be admitted to psychiatry with a 72-hour hold.     She was medically cleared for admission to inpatient psychiatric unit.     Per patient report:    Nevin Alvarado reports that since the couple weeks since coronavirus quarantine measures were enacted she has been unable to meet with her therapist, psychiatrist, attend DBT groups or do her typical activities.  Patient states she has had some appointments via teleconference but they are not adequate.  This has increased her anxiety and the isolation has triggered increased thoughts to swallow.  Patient had tried to call a crisis line but could not reach anyone when she was having a very difficult day.  Patient states she was not trying to hurt herself and swallowing was \"more of a coping mechanism\" to help manage " "anxiety.  Patient states \"I feel like I failed\" and felt like swallowing was the only option left.  Patient reports that she has been taking her medications regularly and not missed any doses by using a machine at scheduled her medication for her.  She does not endorse any recent substance use.  She was last seen by her outpatient psychiatric provider approximately 2 months ago.  Patient's primary goal for hospitalization is rapid discharge to attend her follow-up appointments which include an ILS appointment tomorrow and her scheduled therapy and DBT appointments on Wednesday, 4/1/2020.     Of note patient states her emergency guardianship will complete on April 15, 2020 and she will trial a period without a guardian.  She states she has no current desire to swallow and only swallows paperclips and Alberto pins and not any other types of objects.  Furthermore patient is interested in reducing her dosage of Topamax which she hears can make her birth control less effective.       See Admission note by Marian Ledesma MD on 3/30/2020 for additional details.          Diagnoses:   #Primary Psychiatric Diagnosis  -Borderline personality disorder    #Secondary Psychiatric Diagnosis  -Posttraumatic stress disorder  -Generalized anxiety disorder    Diagnostic Impression:   Nevin Alvarado is a 29 year old  female with a past psychiatric history of borderline personality disorder, PTSD, CANDICE and ADHD who presented to the ED with SIB in the context of swallowing a paperclip and increased anxiety from social isolation and limited outpatient resource availibility. Significant symptoms include SIB, irritable, poor frustration tolerance and impulsive. Her last psychiatric hospitalization was in 3/2018 for foreign body ingestion.  She is currently followed by Dr. Brionna De La Rosa, at [facility]. Current psychosocial stressors include trauma, chronic mental health issues and limited access to outpatient resources " due to quarentine restrictions which she has been coping with by SIB.  Patient's support system includes family and outpatient team.  Substance use does not appear to be playing a contributing role in the patient's presentation.  There is genetic loading for uspecified mental health issues in mother. Medical history does appear to be significant for s/p OD and TBI w/LOC.  The MSE is notable for anxious affect and absent SI. She denies any current self injurious behaviors or thoughts. Her reported symptoms of poor distress tolerance and self harm via foreign body ingestion are consistent with her historic diagnosis of borderline personality disorder.       Given patient's demonstrated chronic recurrent self harm behaviors through foreign body ingestion she was admitted for inpatient psychiatric hospitalization for further evaluation and stabilization.       Consults:   Gastroenterology consulted on 3/29/2020 for foreign body ingestion removal and retrieval.         Hospital Course:   Psychiatric Course:  Nevin Alvarado was admitted to [facility] with attending Marian Ledesma MD on a 72 hour mental health hold. PTA medication were continued    Medical Course:  In the ED, patient was assessed and an abdominal x-ray revealed presence of foreign body in her stomach.  Patient was referred to gastroenterology which scheduled an upper GI endoscopy for removal of the foreign body.  On 3/29/2020: A paperclip was removed from the patient's stomach via upper GI endoscopy and patient was transferred back into the ED patient was then determined stable and appropriate for transfer to  Station 22 for further psychiatric evaluation and management.    Risk Assessment:      Today Nevin Alvarado denies suicidal ideation or current self-harm thoughts. She has notable risk factors for self-harm, including age, single status, anxiety and previous suicide attempts. However, risk is mitigated by commitment to family, sobriety  and ability to volunteer a safety plan. Therefore, based on all available evidence including the factors cited above, she does not appear to be at imminent risk for self-harm, does not meet criteria for a 72-hr hold, and therefore remains appropriate for ongoing outpatient level of care. Additional steps taken to minimize risk include: medication optimization, close psychiatric follow up and provision of crisis resources . Voluntary referral for inpatient hospitalization was offered, she declined this offer.    Nevin was released to [facility]. During this admission, she did not participate in groups and was visible in the milieu, and her symptoms of self harm behaviors improved. At the time of discharge she was determined to not be a danger to herself or others.     This document serves as a transfer of care to Nevin Alvarado's outpatient providers.         Discharge Medications:     Current Discharge Medication List      CONTINUE these medications which have NOT CHANGED    Details   acetaminophen (TYLENOL) 32 mg/mL liquid Take 31.25 mLs (1,000 mg) by mouth every 6 hours as needed for fever or pain  Qty: 355 mL, Refills: 0    Associated Diagnoses: Esophageal dysphagia; Hx of foreign body ingestion      busPIRone (BUSPAR) 10 MG tablet Take 1 tablet (10 mg) by mouth twice daily      Cholecalciferol (VITAMIN D) 50 MCG (2000 UT) CAPS Take 2,000 Units by mouth daily       cloNIDine (CATAPRES) 0.1 MG tablet Take 0.1 mg by mouth 2 times daily       desvenlafaxine (PRISTIQ) 100 MG 24 hr tablet Take 1 tablet (100 mg) by mouth every morning      docosanol (ABREVA) 10 % CREA cream Apply to lip 5 times a day as soon as symptoms begin, do not use for more than 10 days. Used PRN.      gabapentin (NEURONTIN) 600 MG tablet Take 600 mg by mouth 3 times daily       hydrOXYzine (ATARAX) 50 MG tablet Take 1 tablet (50 mg) by mouth 3 times daily as needed for anxiety  Qty: 30 tablet, Refills: 0    Associated  Diagnoses: History of posttraumatic stress disorder (PTSD)      levonorgestrel (MIRENA) 20 MCG/24HR IUD 1 each by Intrauterine route once      lurasidone (LATUDA) 60 MG TABS tablet Take 1 tablet (60 mg) by mouth at bedtime      metFORMIN (GLUCOPHAGE-XR) 500 MG 24 hr tablet Take 1 tablet (500 mg) by mouth daily      ondansetron (ZOFRAN-ODT) 4 MG ODT tab Take 4 mg by mouth every 6 hours as needed for nausea or vomiting       topiramate (TOPAMAX) 100 MG tablet Take 1 tablet (100 mg) by mouth daily at bedtime                  Psychiatric Examination:   Appearance:  awake, alert, adequately groomed, cooperative and no apparent distress  Attitude:  cooperative  Eye Contact:  good  Mood:  anxious  Affect:  intensity is normal  Speech:  clear, coherent  Psychomotor Behavior:  no evidence of tardive dyskinesia, dystonia, or tics  Thought Process:  logical, linear and goal oriented  Associations:  no loose associations  Thought Content:  no evidence of suicidal ideation or homicidal ideation and no auditory hallucinations present  Insight:  fair  Judgment:  fair  Oriented to:  time, person, and place  Attention Span and Concentration:  intact  Recent and Remote Memory:  intact  Language:  english with appropriate syntax and vocabulary  Fund of Knowledge: appropriate  Muscle Strength and Tone: normal  Gait and Station: Normal         Discharge Plan:   Health Care Follow-up Appointments:     Medication Management   Wednesday, April 8th at 8:20am with Dr. Brionna Lopez [facility]   [address] [address]    Phone: [phone]    Fax: [phone]     [facility]   Wednesday, April 1st   [facility]   [address] [place] [address]  Phone: [phone]  Fax: [phone]    Pt seen and discussed with my attending physician, Marian Ledesma MD.   Kaiden Granados MD  PGY-1 Resident    Attestation:   The patient has been seen and evaluated by me,  Marian Ledesma MD. I  have examined the patient today and reviewed the discharge plan with the resident and medical student. I agree with the final assessment and plan, as noted in the discharge summary. I have reviewed today's vital signs, medications, labs and imaging.  Total time discharge plannin minutes  Marian Ledesma MD ,Ph.D.    Although the presentation/diagnosis at the time of admission led to an expectation that hospitalization would span >2 midnights, discharge is reasonable at this time given the following factors: the patient has recovered to her recent basline and does not have current self injurious ideation and had a safety plan.  Her guardian was in agreement with plan.             Appendix A: All Labs This Admission:     Results for orders placed or performed during the hospital encounter of 20   UPPER GI ENDOSCOPY     Status: None   Result Value Ref Range    Upper GI Endoscopy       [address] [place] [address] [phone]     Endoscopy Department  _______________________________________________________________________________  Patient Name: Nevin Alvarado     Procedure Date: 3/29/2020 6:59 PM  MRN: [id number]                       Account Number: [id number]  YOB: 1990             Admit Type: Emergency Department  Age: 29                                Gender: Female  Note Status: Finalized                Attending MD: Doug Hansen MD  Total Sedation Time:                    _______________________________________________________________________________     Procedure:           Upper GI endoscopy  Indications:         Foreign body in the stomach  Providers:           Doug Hansen MD, Marianna Perez  Referring MD:        Jeffy Velasquez  Medicines:           General Anesthesia  Complications:       No immediate complications.  ________________________________________________________ _______________________  Procedure:            Pre-Anesthesia Assessment:                       - Airway Examination: Mallampati Class IV (tongue                        obstructs view of the soft palate).                       - Respiratory Examination: clear to auscultation.                       - CV Examination: regular rate and rhythm.                       - ASA Grade Assessment: II - A patient with mild                        systemic disease.                       - After reviewing the risks and benefits, the patient                        was deemed in satisfactory condition to undergo the                        procedure.                       - The anesthesia plan was to use general anesthesia.                       - Immediately prior to administration of medications,                        the patient was re-assessed for adequacy to receive                        sedatives.                       - The heart rate, respiratory rate, oxygen saturations,                        b lood pressure, adequacy of pulmonary ventilation, and                        response to care were monitored throughout the procedure.                       - The physical status of the patient was re-assessed                        after the procedure.                       After obtaining informed consent, the endoscope was                        passed under direct vision. Throughout the procedure,                        the patient's blood pressure, pulse, and oxygen                        saturations were monitored continuously. The Endoscope                        was introduced through the mouth, and advanced to the                        duodenum. The upper GI endoscopy was accomplished                        without difficulty. The patient tolerated the procedure                        well.                                                                                   Findings:       Mild heme noted in mid-esophagus during scope insertion.       Approximately 10 cm  metal r od (straightened out paper clip) was partly        embedded into the body of the stomach with the other end near pylorus        (loose). There was moderate amount of food present as well. Initially,        an overtube was placed, but it proved to be too short. Next, the        endoscope was equipped with a hat and the yang was taken out using        forceps uneventfully. The puncture wound in the body of the stomach        appeared very shallow and there is no suspicion for perforation. The        stomach and esophagus were once again examined after the foreign body        was removed and no worrisome lesions were noted.       The duodenal bulb was normal.                                                                                   Impression:          - Metal yang was taken out from the stomach.                       - Minor abrasion noted in the esophagus, likely related                        to the initial swallowing.                       - Normal duodenal bulb.                        - No specimens collected.  Recommendation:      - Observe patient's clinical course.                       - My recommendation is to admit the patient to                        psychiatry after she recovers in PAR. The patient's                        overall prognosis is grim. She already had two                        perforations associated with foreign body ingestions.                        This is a fatal condition, unless the behavior is                        controlled. Her situation needs to be reassessed. At the                        very minimum we need to make sure she does not continue                        swallowing while we are in the period of limited PPE                        resources due to COVID-19 situation when all semi-urgent                        procedures are being held for that period of time.                                                                                     Signed  Electronically by Doug Hansen MD  _____________________  A geri aHnsen MD  3/29/2020 7:59:10 PM  I was physically present for the entire viewing portion of the exam.  __________________________  Signature of teaching physician  Amairani/Arjun Hansen MD  Number of Addenda: 0    Note Initiated On: 3/29/2020 6:59 PM  Scope In:  Scope Out:     XR Abdomen Port 1 View     Status: Abnormal   Result Value Ref Range    Radiologist flags Foreign body ingestion (Urgent)     Narrative    XR ABDOMEN PORT 1 VW3/29/2020 4:20 PM    INDICATION: swallowed paperclip    COMPARISON:  1/18/2020    FINDINGS: AP view of the abdomen. A linear radiopaque density projects  over the mid abdomen, possibly in stomach, and measures 12.7 cm long.  Nonobstructive bowel gas pattern. No visualized pneumatosis or portal  venous gas. Lower chest is clear. No acute osseous abnormalities. A  catheter projects with tip overlying the high right atrium.      Impression    IMPRESSION: There is a linear radiopaque density projecting over the  mid abdomen which could potentially represent a straightened paperclip  given clinical history.    [Urgent Result: Foreign body ingestion]    Finding was identified on 3/29/2020 4:28 PM.     Dr. Velasquez was contacted by Dr. Pratt at 3/29/2020 4:35 PM and  verbalized understanding of the urgent finding.     I have personally reviewed the examination and initial interpretation  and I agree with the findings.    YEHUDA TROY MD   Basic metabolic panel     Status: Abnormal   Result Value Ref Range    Sodium 142 133 - 144 mmol/L    Potassium 3.4 3.4 - 5.3 mmol/L    Chloride 110 (H) 94 - 109 mmol/L    Carbon Dioxide 22 20 - 32 mmol/L    Anion Gap 9 3 - 14 mmol/L    Glucose 122 (H) 70 - 99 mg/dL    Urea Nitrogen 8 7 - 30 mg/dL    Creatinine 0.58 0.52 - 1.04 mg/dL    GFR Estimate >90 >60 mL/min/[1.73_m2]    GFR Estimate If Black >90 >60 mL/min/[1.73_m2]    Calcium 8.6 8.5 - 10.1 mg/dL   CBC with platelets  differential     Status: Abnormal   Result Value Ref Range    WBC 9.7 4.0 - 11.0 10e9/L    RBC Count 4.16 3.8 - 5.2 10e12/L    Hemoglobin 11.6 (L) 11.7 - 15.7 g/dL    Hematocrit 37.2 35.0 - 47.0 %    MCV 89 78 - 100 fl    MCH 27.9 26.5 - 33.0 pg    MCHC 31.2 (L) 31.5 - 36.5 g/dL    RDW 13.9 10.0 - 15.0 %    Platelet Count 310 150 - 450 10e9/L    Diff Method Automated Method     % Neutrophils 74.4 %    % Lymphocytes 17.0 %    % Monocytes 5.5 %    % Eosinophils 2.3 %    % Basophils 0.4 %    % Immature Granulocytes 0.4 %    Nucleated RBCs 0 0 /100    Absolute Neutrophil 7.2 1.6 - 8.3 10e9/L    Absolute Lymphocytes 1.7 0.8 - 5.3 10e9/L    Absolute Monocytes 0.5 0.0 - 1.3 10e9/L    Absolute Eosinophils 0.2 0.0 - 0.7 10e9/L    Absolute Basophils 0.0 0.0 - 0.2 10e9/L    Abs Immature Granulocytes 0.0 0 - 0.4 10e9/L    Absolute Nucleated RBC 0.0    Internal Medicine Adult IP Consult for [facility] in [facility]: Patient to be seen: ASAP within 4 hrs; Call back #: [phone]; Possible COVID; Consultant may enter orders: Yes; Requesting provider? Attending physician; Name: Hugo     Status: None ()    Narrative    Julita Melissa PA     3/30/2020 10:35 AM  [facility] [place]  Consult Note - Hospitalist Service     Date of Admission: 3/29/2020  Consult Requested by: Dr. Arias  Reason for Consult: Possible COVID    Assessment & Plan   Nevin Alvarado is a 29 year old female with a history of   recurrent foreign body ingestion, provoked PE, morbid obesity,   borderline personality disorder, ADD, PTSD, depression, anxiety,   rectal foreign body insertion, and peripheral neuropathy who was   admitted to inpatient psychiatry after ingestion of a paper clip   on 3/29/20 requiring EGD for removal.    #Recurrent Foreign Body Ingestion - S/p multiple EGDs.   Complicated by prior perforation. Most recently swallowed paper   clip on 3/29/20 s/p EGD with removal. GI recommended  inpatient   psychiatry admission & ethics consult given potential fatality of   condition and limited PPE/OR resources in setting of COVID-19.  - Management per Psychiatry. Strongly recommend 1:1 monitoring   for duration of admission to avoid recurrent ingestions.    #Borderline Personality Disorder, ADD, Anxiety, Depression, PTSD   - On Pristiq, Buspar, Clonidine, Gabapentin, and Latuda PTA.  - Management per Psychiatry.    #Hx of PE - Diagnosed in 12/2019. Provoked due to surgery and   hospitalizations with prolonged immobilization. Completed 3 month   course of Apixaban.   - Notify Medicine if hypoxia, SOB, or LE edema develop.    #Morbid Obesity - BMI 46. Weight 255 lbs.  - Continue PTA Topamax and Metformin for weight loss    #Peripheral Neuropathy   - Continue PTA Gabapentin.    At this time, patient has no symptoms of COVD-19 and does not   require screening or isolation. Please notify our team if   symptoms concerning for COVID-19 develop (fever, cough, SOB).    Of note, patient's port-a-cath was left accessed, orders placed   for medical flyer RN to de-access. Psychiatry RN aware.    Medicine will sign off. Please contact the on-call MARYCRUZ for any   intercurrent medical issues which arise.    Deepali Melissa PA-C  [facility]  __________________________________________________________________  ____    Chief Complaint   Intentional Foreign Body Ingestion    History is obtained from the patient and electronic health record    History of Present Illness   Nevin Alvarado is a 29 year old female with a history of   recurrent foreign body ingestion, provoked PE, morbid obesity,   borderline personality disorder, ADD, PTSD, depression, anxiety,   rectal foreign body insertion, and peripheral neuropathy who was   admitted to inpatient psychiatry after ingestion of a paper clip   on 3/29/20 requiring EGD for removal. GI concerned about   recurrent ingestions with 2 prior perforations and  multiple EGDs   with limited PPE and OR resources in setting of COVID-19 pandemic   and requested inpatient psychaitry admission with ethics consult.    Internal Medicine was consulted today due to concern for COVID-19   which was triggered after patient endorsed scratchy throat   following EGD. Currently, patient is afebrile without cough or   SOB. Scratchy throat has resolved. She also denies chest pain,   n/v/c/d, abdominal pain, blood in the stools, or urinary   symptoms.      Review of Systems   The 10 point Review of Systems is negative other than noted in   the HPI or here.     Past Medical History    I have reviewed this patient's medical history and updated it   with pertinent information if needed.   Past Medical History:   Diagnosis Date   ?  ADD (attention deficit disorder)    ?  Anorexia nervosa with bulimia     history of; on Topamax   ?  Anxiety    ?  Borderline personality disorder (H)    ?  Depression    ?  H/O self-harm    ?  History of pulmonary embolism 12/2019    Provoked. Completed 3 month course of Apixaban   ?  Morbid obesity (H)    ?  Neuropathy    ?  PTSD (post-traumatic stress disorder)    ?  Rectal foreign body - Recurrent issue, self placed    ?  Swallowed foreign body - Recurrent issue, self placed        Past Surgical History   I have reviewed this patient's surgical history and updated it   with pertinent information if needed.  Past Surgical History:   Procedure Laterality Date   ?  COMBINED ESOPHAGOSCOPY, GASTROSCOPY, DUODENOSCOPY (EGD),   REPLACE ESOPHAGEAL STENT N/A 10/9/2019    Procedure: Upper Endoscopy with Suture Placement;  Surgeon:   Zurdo Ramirez MD;  [facility]   ?  ESOPHAGOSCOPY, GASTROSCOPY, DUODENOSCOPY (EGD), COMBINED N/A   3/9/2017    Procedure: COMBINED ESOPHAGOSCOPY, GASTROSCOPY, DUODENOSCOPY   (EGD), REMOVE FOREIGN BODY;  Surgeon: Avis Guzmán MD;  [facility]   ?  ESOPHAGOSCOPY, GASTROSCOPY, DUODENOSCOPY (EGD), COMBINED N/A    4/20/2017    Procedure: COMBINED ESOPHAGOSCOPY, GASTROSCOPY, DUODENOSCOPY   (EGD), REMOVE FOREIGN BODY;  EGD removal Foregin body;  Surgeon:   Lokesh Paula MD;  [facility]   ?  ESOPHAGOSCOPY, GASTROSCOPY, DUODENOSCOPY (EGD), COMBINED N/A   6/12/2017    Procedure: COMBINED ESOPHAGOSCOPY, GASTROSCOPY, DUODENOSCOPY   (EGD);  COMBINED ESOPHAGOSCOPY, GASTROSCOPY, DUODENOSCOPY (EGD)   [[id number]]attempted removal of foreign body;  Surgeon:   Pamela Perez MD;  [facility]   ?  ESOPHAGOSCOPY, GASTROSCOPY, DUODENOSCOPY (EGD), COMBINED N/A   6/9/2017    Procedure: COMBINED ESOPHAGOSCOPY, GASTROSCOPY, DUODENOSCOPY   (EGD), REMOVE FOREIGN BODY;  Esophagoscopy, Gastroscopy,   Duodenoscopy, Removal of Foreign Body;  Surgeon: Dejon Marsh MD;  [facility]   ?  ESOPHAGOSCOPY, GASTROSCOPY, DUODENOSCOPY (EGD), COMBINED N/A   1/6/2018    Procedure: COMBINED ESOPHAGOSCOPY, GASTROSCOPY, DUODENOSCOPY   (EGD), REMOVE FOREIGN BODY;  COMBINED ESOPHAGOSCOPY, GASTROSCOPY,   DUODENOSCOPY (EGD) [by pascal net and snare with profol sedation;    Surgeon: Feliciano Emmanuel MD;  [facility]   ?  ESOPHAGOSCOPY, GASTROSCOPY, DUODENOSCOPY (EGD), COMBINED N/A   3/19/2018    Procedure: COMBINED ESOPHAGOSCOPY, GASTROSCOPY, DUODENOSCOPY   (EGD), REMOVE FOREIGN BODY;   Esophagodscopy, Gastroscopy,   Duodenoscopy,Foreign Body Removal;  Surgeon: Brice uGzmán MD;    [facility]   ?  ESOPHAGOSCOPY, GASTROSCOPY, DUODENOSCOPY (EGD), COMBINED N/A   4/16/2018    Procedure: COMBINED ESOPHAGOSCOPY, GASTROSCOPY, DUODENOSCOPY   (EGD), REMOVE FOREIGN BODY;  Esophagogastroduodenoscopy  Foreign   Body Removal X 2;  Surgeon: Royer Moise MD;    [facility]   ?  ESOPHAGOSCOPY, GASTROSCOPY, DUODENOSCOPY (EGD), COMBINED N/A   6/1/2018    Procedure: COMBINED ESOPHAGOSCOPY, GASTROSCOPY, DUODENOSCOPY   (EGD), REMOVE FOREIGN BODY;  COMBINED ESOPHAGOSCOPY, GASTROSCOPY,   DUODENOSCOPY with  removal of foreign body, propofol sedation by   anesthesia;  Surgeon: Brice Martinez MD;  [facility]   ?  ESOPHAGOSCOPY, GASTROSCOPY, DUODENOSCOPY (EGD), COMBINED N/A   7/25/2018    Procedure: COMBINED ESOPHAGOSCOPY, GASTROSCOPY, DUODENOSCOPY   (EGD), REMOVE FOREIGN BODY;;  Surgeon: Candy Castelan MD;  [facility]   ?  ESOPHAGOSCOPY, GASTROSCOPY, DUODENOSCOPY (EGD), COMBINED N/A   7/28/2018    Procedure: COMBINED ESOPHAGOSCOPY, GASTROSCOPY, DUODENOSCOPY   (EGD), REMOVE FOREIGN BODY;  COMBINED ESOPHAGOSCOPY, GASTROSCOPY,   DUODENOSCOPY (EGD), REMOVE FOREIGN BODY;  Surgeon: Brice Guzmán MD;  [facility]   ?  ESOPHAGOSCOPY, GASTROSCOPY, DUODENOSCOPY (EGD), COMBINED N/A   7/31/2018    Procedure: COMBINED ESOPHAGOSCOPY, GASTROSCOPY, DUODENOSCOPY   (EGD);  COMBINED ESOPHAGOSCOPY, GASTROSCOPY, DUODENOSCOPY (EGD)   TO REMOVE FOREIGN BODY;  Surgeon: Lokesh Paula MD;    [facility]   ?  ESOPHAGOSCOPY, GASTROSCOPY, DUODENOSCOPY (EGD), COMBINED N/A   8/4/2018    Procedure: COMBINED ESOPHAGOSCOPY, GASTROSCOPY, DUODENOSCOPY   (EGD), REMOVE FOREIGN BODY;   combined esophagoscopy,   gastroscopy, duodenoscopy, REMOVE FOREIGN BODY ;  Surgeon:   Lokesh Paula MD;  [facility]   ?  ESOPHAGOSCOPY, GASTROSCOPY, DUODENOSCOPY (EGD), COMBINED N/A   10/6/2019    Procedure: ESOPHAGOGASTRODUODENOSCOPY (EGD) with fireign body   removal x2, esophageal stent placement with floroscopy;  Surgeon:   Timoteo Espana MD;  [facility]   ?  ESOPHAGOSCOPY, GASTROSCOPY, DUODENOSCOPY (EGD), COMBINED N/A   12/2/2019    Procedure: Esophagogastroduodenoscopy with esophageal stent   removal, esophogram;  Surgeon: Kailee Tena MD;    [facility]   ?  ESOPHAGOSCOPY, GASTROSCOPY, DUODENOSCOPY (EGD), COMBINED N/A   12/17/2019    Procedure: ESOPHAGOGASTRODUODENOSCOPY, WITH FOREIGN BODY   REMOVAL;  Surgeon: Pamela Perez MD;  [facility]   ?  ESOPHAGOSCOPY,  GASTROSCOPY, DUODENOSCOPY (EGD), COMBINED N/A   12/13/2019    Procedure: ESOPHAGOGASTRODUODENOSCOPY, WITH FOREIGN BODY   REMOVAL;  Surgeon: Samia Stanton MD;  [facility]   ?  ESOPHAGOSCOPY, GASTROSCOPY, DUODENOSCOPY (EGD), COMBINED N/A   12/28/2019    Procedure: ESOPHAGOGASTRODUODENOSCOPY (EGD) Removal of Foreign   Body X 2;  Surgeon: Huy Kelley MD;  [facility]   ?  ESOPHAGOSCOPY, GASTROSCOPY, DUODENOSCOPY (EGD), COMBINED N/A   1/5/2020    Procedure: ESOPHAGOGASTRODUOENOSCOPY WITH FOREIGN BODY REMOVAL;    Surgeon: Pamela Perez MD;  [facility]   ?  ESOPHAGOSCOPY, GASTROSCOPY, DUODENOSCOPY (EGD), COMBINED N/A   1/3/2020    Procedure: ESOPHAGOGASTRODUODENOSCOPY (EGD) REMOVAL OF FOREIGN   BODY.;  Surgeon: Pamela Perez MD;  [facility]   ?  ESOPHAGOSCOPY, GASTROSCOPY, DUODENOSCOPY (EGD), COMBINED N/A   1/13/2020    Procedure: ESOPHAGOGASTRODUODENOSCOPY (EGD) for foreign body   removal;  Surgeon: Lokesh Paula MD;  [facility]   ?  ESOPHAGOSCOPY, GASTROSCOPY, DUODENOSCOPY (EGD), COMBINED N/A   1/18/2020    Procedure: Diagnostic ESOPHAGOGASTRODUODENOSCOPY (EGD;  Surgeon:   Lokesh Paula MD;  [facility]   ?  EXAM UNDER ANESTHESIA ANUS N/A 1/10/2017    Procedure: EXAM UNDER ANESTHESIA ANUS;  Surgeon: Annmarie Haynes MD;  [facility]   ?  EXAM UNDER ANESTHESIA RECTUM N/A 7/19/2018    Procedure: EXAM UNDER ANESTHESIA RECTUM;  EXAM UNDER ANESTHESIA,   REMOVAL OF RECTAL FOREIGN BODY;  Surgeon: Annmarie Haynes MD;  [facility]   ?  HC REMOVE FECAL IMPACTION OR FB W ANESTHESIA N/A 12/18/2016    Procedure: REMOVE FECAL IMPACTION/FOREIGN BODY UNDER ANESTHESIA;    Surgeon: Soham Cano MD;  [facility]   ?  KNEE SURGERY - removed a small tissue mass from knee Right    ?  LAPAROSCOPIC ABLATION ENDOMETRIOSIS     ?  LAPAROTOMY EXPLORATORY N/A 1/10/2017    Procedure: LAPAROTOMY EXPLORATORY;  Surgeon:  Annmarie Haynes MD;  [facility]   ?  LAPAROTOMY EXPLORATORY  09/11/2019    Manual manipulation of bowel to remove pill bottle in rectum   ?  lymph nodes removed from neck; benign  age 7   ?  MAMMOPLASTY REDUCTION Bilateral    ?  RELEASE CARPAL TUNNEL Bilateral    ?  SIGMOIDOSCOPY FLEXIBLE N/A 1/10/2017    Procedure: SIGMOIDOSCOPY FLEXIBLE;  Surgeon: Annmarie Haynes MD;  [facility]   ?  SIGMOIDOSCOPY FLEXIBLE N/A 5/8/2018    Procedure: SIGMOIDOSCOPY FLEXIBLE;  flex sig with foreign body   removal using snare and rattooth forcep;  Surgeon: Soham Cano MD;  [facility]   ?  SIGMOIDOSCOPY FLEXIBLE N/A 2/20/2019    Procedure: Exam under anesthesia Colonoscopy with attempted    removal of rectal foreign body;  Surgeon: Estrada Chávez MD;    [facility]       Social History   I have reviewed this patient's social history and updated it with   pertinent information if needed.  Social History     Tobacco Use   ?  Smoking status: Never Smoker   ?  Smokeless tobacco: Never Used   Substance Use Topics   ?  Alcohol use: No     Alcohol/week: 0.0 standard drinks   ?  Drug use: No       Family History   I have reviewed this patient's family history and updated it with   pertinent information if needed.   Family History   Problem Relation Age of Onset   ?  Diabetes Type 2  Maternal Grandmother    ?  Diabetes Type 2  Paternal Grandmother    ?  Breast Cancer Paternal Grandmother    ?  Cerebrovascular Disease Father 53        Medications   Medications Prior to Admission   Medication Sig Dispense Refill Last Dose   ?  acetaminophen (TYLENOL) 32 mg/mL liquid Take 31.25 mLs (1,000   mg) by mouth every 6 hours as needed for fever or pain 355 mL 0    ?  busPIRone (BUSPAR) 10 MG tablet Take 1 tablet (10 mg) by mouth   twice daily      ?  Cholecalciferol (VITAMIN D) 50 MCG (2000 UT) CAPS Take 2,000   Units by mouth daily       ?  cloNIDine (CATAPRES) 0.1 MG tablet Take 0.1 mg by mouth 2    times daily       ?  desvenlafaxine (PRISTIQ) 100 MG 24 hr tablet Take 1 tablet   (100 mg) by mouth every morning      ?  docosanol (ABREVA) 10 % CREA cream Apply to lip 5 times a day   as soon as symptoms begin, do not use for more than 10 days. Used   PRN.      ?  gabapentin (NEURONTIN) 600 MG tablet Take 600 mg by mouth 3   times daily       ?  hydrOXYzine (ATARAX) 50 MG tablet Take 1 tablet (50 mg) by   mouth 3 times daily as needed for anxiety 30 tablet 0    ?  levonorgestrel (MIRENA) 20 MCG/24HR IUD 1 each by Intrauterine   route once      ?  lurasidone (LATUDA) 60 MG TABS tablet Take 1 tablet (60 mg) by   mouth at bedtime      ?  metFORMIN (GLUCOPHAGE-XR) 500 MG 24 hr tablet Take 1 tablet   (500 mg) by mouth daily      ?  ondansetron (ZOFRAN-ODT) 4 MG ODT tab Take 4 mg by mouth every   6 hours as needed for nausea or vomiting       ?  topiramate (TOPAMAX) 100 MG tablet Take 1 tablet (100 mg) by   mouth daily at bedtime          Allergies   Allergies   Allergen Reactions   ?  Amoxicillin-Pot Clavulanate Other (See Comments), Rash and   Swelling     PN: facial swelling, left side. Also had cortisone injection   the same day as she started the Augmentin.  PN: facial swelling, left side. Also had cortisone injection the   same day as she started the Augmentin.  Noted in downtime recovery of chart.     ?  Penicillins Anaphylaxis   ?  Vancomycin Swelling, Itching and Rash     Other reaction(s): Redness  Flushed face, very itchy; HUT Comment: Flushed face, very itchy;   HUT Reaction: Angioedema; HUT Reaction: Redness; HUT Severity:   Med; HUT Noted: [id number]  Flushed face, very itchy  Flushed face, very itchy  Flushed face, very itchy     ?  Hydrocodone Nausea and Vomiting and GI Disturbance     vomiting for days  vomiting for days  vomiting for days  vomiting for days, PN: vomiting for days  vomiting for days  vomiting for days  vomiting for days  vomiting for days  vomiting for days  vomiting for days, PN:  vomiting for days  vomiting for days  vomiting for days  vomiting for days  vomiting for days  ; HUT Comment: vomiting for days; HUT Reaction: Gastrointestinal;   HUT Reaction: Nausea And Vomiting; HUT Reaction Type:   Intolerance; HUT Severity: Med; HUT Noted: [id number]  vomiting for days     ?  Blood-Group Specific Substance Other (See Comments)     Patient has an anti-Cw and non-specific antibodies. Blood   product orders may be delayed. Draw one red top and two purple   top tubes for all type/screen/crossmatch orders.   ?  Influenza Vaccines Other (See Comments)   ?  Oseltamivir Hives     med stopped, PN: med stopped   ?  Cephalosporins Rash   ?  Lamotrigine Rash     Possibly associated with Lamictal.   HUT Comment: Possibly associated with Lamictal. ; HUT Reaction:   Rash; HUT Reaction Type: Allergy; HUT Severity: Low; HUT Noted:   [id number]   ?  Latex Rash       Physical Exam   Vital Signs: Temp: 98.2  F (36.8  C) Temp src: Oral BP: (!)   128/97 Pulse: 88   Resp: 18 SpO2: 98 % O2 Device: None (Room air)   Weight: 255 lbs 0 oz  General: Awake. Non-toxic appearing. NAD.  CV: RRR. No appreciable murmurs.  Respiratory: Normal effort on RA. Lungs CTAB.  GI: Soft, non-tender, and non-distended with bowel sounds   present.  Extremities: No peripheral edema. Warm and well perfused.  Neuro: Alert. Answers questions appropriately. Moves all   extremities.  Skin: No rashes or jaundice on exposed areas.    Data   Results for orders placed or performed during the hospital   encounter of 03/29/20 (from the past 24 hour(s))   XR Abdomen Port 1 View   Result Value Ref Range    Radiologist flags Foreign body ingestion (Urgent)     Narrative    XR ABDOMEN PORT 1 VW3/29/2020 4:20 PM    INDICATION: swallowed paperclip    COMPARISON:  1/18/2020    FINDINGS: AP view of the abdomen. A linear radiopaque density   projects  over the mid abdomen, possibly in stomach, and measures 12.7 cm   long.  Nonobstructive bowel gas pattern. No  visualized pneumatosis or   portal  venous gas. Lower chest is clear. No acute osseous abnormalities.   A  catheter projects with tip overlying the high right atrium.      Impression    IMPRESSION: There is a linear radiopaque density projecting over   the  mid abdomen which could potentially represent a straightened   paperclip  given clinical history.    [Urgent Result: Foreign body ingestion]    Finding was identified on 3/29/2020 4:28 PM.     Dr. Velasquez was contacted by Dr. Pratt at 3/29/2020 4:35 PM and  verbalized understanding of the urgent finding.     I have personally reviewed the examination and initial   interpretation  and I agree with the findings.    YEHUDA TROY MD   Basic metabolic panel   Result Value Ref Range    Sodium 142 133 - 144 mmol/L    Potassium 3.4 3.4 - 5.3 mmol/L    Chloride 110 (H) 94 - 109 mmol/L    Carbon Dioxide 22 20 - 32 mmol/L    Anion Gap 9 3 - 14 mmol/L    Glucose 122 (H) 70 - 99 mg/dL    Urea Nitrogen 8 7 - 30 mg/dL    Creatinine 0.58 0.52 - 1.04 mg/dL    GFR Estimate >90 >60 mL/min/[1.73_m2]    GFR Estimate If Black >90 >60 mL/min/[1.73_m2]    Calcium 8.6 8.5 - 10.1 mg/dL   CBC with platelets differential   Result Value Ref Range    WBC 9.7 4.0 - 11.0 10e9/L    RBC Count 4.16 3.8 - 5.2 10e12/L    Hemoglobin 11.6 (L) 11.7 - 15.7 g/dL    Hematocrit 37.2 35.0 - 47.0 %    MCV 89 78 - 100 fl    MCH 27.9 26.5 - 33.0 pg    MCHC 31.2 (L) 31.5 - 36.5 g/dL    RDW 13.9 10.0 - 15.0 %    Platelet Count 310 150 - 450 10e9/L    Diff Method Automated Method     % Neutrophils 74.4 %    % Lymphocytes 17.0 %    % Monocytes 5.5 %    % Eosinophils 2.3 %    % Basophils 0.4 %    % Immature Granulocytes 0.4 %    Nucleated RBCs 0 0 /100    Absolute Neutrophil 7.2 1.6 - 8.3 10e9/L    Absolute Lymphocytes 1.7 0.8 - 5.3 10e9/L    Absolute Monocytes 0.5 0.0 - 1.3 10e9/L    Absolute Eosinophils 0.2 0.0 - 0.7 10e9/L    Absolute Basophils 0.0 0.0 - 0.2 10e9/L    Abs Immature Granulocytes 0.0 0 - 0.4 10e9/L     Absolute Nucleated RBC 0.0    UPPER GI ENDOSCOPY   Result Value Ref Range    Upper GI Endoscopy       [facility], [place]  [address] [place] [address] [phone]     Endoscopy   Department  __________________________________________________________________  _____________  Patient Name: Nevin Alvarado     Procedure Date: 3/29/2020   6:59 PM  MRN: [id number]                       Account Number: [id number]  YOB: 1990             Admit Type: Emergency   Department  Age: 29                                Gender: Female  Note Status: Finalized                Attending MD: Doug Hansen MD  Total Sedation Time:                    __________________________________________________________________  _____________     Procedure:           Upper GI endoscopy  Indications:         Foreign body in the stomach  Providers:           Doug Hansen MD, Marianna Meadows MD:        Jeffy Velasquez  Medicines:           General Anesthesia  Complications:       No immediate complications.  ________________________________________________________   _______________________  Procedure:           Pre-Anesthesia Assessment:                       - Airway Examination: Mallampati Class IV   (tongue                        obstructs view of the soft palate).                       - Respiratory Examination: clear to   auscultation.                       - CV Examination: regular rate and rhythm.                       - ASA Grade Assessment: II - A patient with   mild                        systemic disease.                       - After reviewing the risks and benefits,   the patient                        was deemed in satisfactory condition to   undergo the                        procedure.                       - The anesthesia plan was to use general   anesthesia.                       - Immediately prior to administration of   medications,                        the  patient was re-assessed for adequacy to   receive                        sedatives.                       - The heart rate, respiratory rate, oxygen   saturations,                        b lood pressure, adequacy of pulmonary   ventilation, and                        response to care were monitored throughout   the procedure.                       - The physical status of the patient was   re-assessed                        after the procedure.                       After obtaining informed consent, the   endoscope was                        passed under direct vision. Throughout the   procedure,                        the patient's blood pressure, pulse, and   oxygen                        saturations were monitored continuously. The   Endoscope                        was introduced through the mouth, and   advanced to the                        duodenum. The upper GI endoscopy was   accomplished                        without difficulty. The patient tolerated   the procedure                        well.                                                                                     Findings:       Mild heme noted in mid-esophagus during scope insertion.       Approximately 10 cm metal r od (straightened out paper clip)   was partly        embedded into the body of the stomach with the other end   near pylorus        (loose). There was moderate amount of food present as well.   Initially,        an overtube was placed, but it proved to be too short. Next,   the        endoscope was equipped with a hat and the yang was taken out   using        forceps uneventfully. The puncture wound in the body of the   stomach        appeared very shallow and there is no suspicion for   perforation. The        stomach and esophagus were once again examined after the   foreign body        was removed and no worrisome lesions were noted.       The duodenal bulb was normal.                                                                                      Impression:          - Metal yang was taken out from the stomach.                       - Minor abrasion noted in the esophagus,   likely related                        to the initial swallowing.                       - Normal duodenal bulb.                        - No specimens collected.  Recommendation:      - Observe patient's clinical course.                       - My recommendation is to admit the patient   to                        psychiatry after she recovers in PAR. The   patient's                        overall prognosis is grim. She already had   two                        perforations associated with foreign body   ingestions.                        This is a fatal condition, unless the   behavior is                        controlled. Her situation needs to be   reassessed. At the                        very minimum we need to make sure she does   not continue                        swallowing while we are in the period of   limited PPE                        resources due to COVID-19 situation when all   semi-urgent                        procedures are being held for that period of   time.                                                                                       Signed Electronically by Doug Hansen MD  _____________________  A geri Hansen MD  3/29/2020 7:59:10 PM  I was physically present for the entire viewing portion of the   exam.  __________________________  Signature of teaching physician  B4alicia/Arjun Hansen MD  Number of Addenda: 0    Note Initiated On: 3/29/2020 6:59 PM  Scope In:  Scope Out:

## 2020-03-30 NOTE — PROGRESS NOTES
Initial Psychosocial Assessment  I have reviewed the chart, met with the patient, and developed Care Plan. Information for assessment was obtained from the patient, the patient's medical chart, and the patient's DEC assessment.      Presenting Problem: Shannan Alvarado is a 29 year old male/female who presented to the ED with complaints of a possible foreign object ingestion. According to the patient, she had not had any incidences of swallowing in months but recently relapsed due to the isolation related to COVID-19. The patient reported that she does have a safety plan in place that she is supposed to use for her urges, but stated that she was unable to get a hold anyone during the crisis. The patient denied any drug or alcohol use. There was no UTOX available at the time of this assessment. The patient denied any current SI to this writer and reported wanting to go home.      History of Mental Health and Chemical Dependency: The patient has a significant history of inpatient mental health hospitalizations for foreign obejct ingestion or insertion. The patient's last documented ingestion with the intent of self harm was on 2/13/18. The patient has also had at least 3 overdoses within the last 3 years with the last being on 4/17/17 with an overdose on Oxycodone. According to the patient's chart, she was last hospitalized at [facility] in January 2020 for the ingestion of a stretched out spring. She was hospitalized for one day.     Past Diagnosis: PTSD, BPD, Depression, Anxiety, and ADD.     History of SI or SIB: The patient has had multiple suicide attempts.     History of Aggression: None.     Current Stressors: Isolation in relation to having to be inside due to the current pandemic.     Protective Factors: Established outpatient providers (psychiatry, PCP, therapy).     Significant Life Events  (Illness, Abuse, Trauma, Death): The patient denied any hx of significant life events.      Family/Living Situation: The  patient is currently living in a independent living facility that is staffed 24/7. The patient denied having nay contact with her family members and did not want to     Familial History of Mental Illness: The patient denied.      Educational Background: Unable to assess.      Financial Status (Employment/Income): Not currently employed. The patient does receive SSI.      Legal Issues: The patient is currently hospitalized on a 72 hour hold:  Hold Start - 3/29/20 at 11:44pm  Hold  End: 4/2/20 at 12:01am    Legal Guardian: Marianna with [facility] ([phone])       Ethnic/Cultural Considerations: None.       Spiritual Orientation: None.         Service History: None.      Social Functioning (organization, interests, Strengths): Patient reported that she enjoys arts and crafts and puzzles.     Goals for Hospitalization: The patient stated that she is hoping to be discharged today so she can keep her ILS appointment for tomorrow.       Current Treatment Providers are:  Primary Care: Latonya Knight with [facility] in [place].    Psychiatry: Dr. Brionna De La Rosa with [facility].   Therapist: Provider at [facility]. The patient is also participating in DBT with [facility].   : Solange Quezada ([phone])   Other Providers: Legal Guardian - Marianna ([phone])     Social Service Assessment/Plan: CTC will explore options for follow up care and  provide a psychological assessment.Patient will be provided with a safe environment, medication management, as well as offered groups for coping skills.

## 2020-03-30 NOTE — ED NOTES
72 hour hold paperwork read to patient. Provided copy for patient, put one in chart and security requested one

## 2020-03-30 NOTE — ED NOTES
[facility], [place]   ED Nurse to Floor Handoff     Nevin Alvarado is a 29 year old female who speaks English and lives with others,  in a home  They arrived in the ED by ambulance from home    ED Chief Complaint: Swallowed Foreign Body    ED Dx;   Final diagnoses:   Swallowed foreign body, initial encounter   At high risk for self harm         Needed?: No    Allergies:   Allergies   Allergen Reactions     Amoxicillin-Pot Clavulanate Other (See Comments), Rash and Swelling     PN: facial swelling, left side. Also had cortisone injection the same day as she started the Augmentin.  PN: facial swelling, left side. Also had cortisone injection the same day as she started the Augmentin.  Noted in downtime recovery of chart.       Penicillins Anaphylaxis     Vancomycin Swelling, Itching and Rash     Other reaction(s): Redness  Flushed face, very itchy; HUT Comment: Flushed face, very itchy; HUT Reaction: Angioedema; HUT Reaction: Redness; HUT Severity: Med; HUT Noted: [id number]  Flushed face, very itchy  Flushed face, very itchy  Flushed face, very itchy       Hydrocodone Nausea and Vomiting and GI Disturbance     vomiting for days  vomiting for days  vomiting for days  vomiting for days, PN: vomiting for days  vomiting for days  vomiting for days  vomiting for days  vomiting for days  vomiting for days  vomiting for days, PN: vomiting for days  vomiting for days  vomiting for days  vomiting for days  vomiting for days  ; HUT Comment: vomiting for days; HUT Reaction: Gastrointestinal; HUT Reaction: Nausea And Vomiting; HUT Reaction Type: Intolerance; HUT Severity: Med; HUT Noted: [id number]  vomiting for days       Blood-Group Specific Substance Other (See Comments)     Patient has an anti-Cw and non-specific antibodies. Blood product orders may be delayed. Draw one red top and two purple top tubes for all type/screen/crossmatch orders.     Influenza Vaccines Other (See  Comments)     Oseltamivir Hives     med stopped, PN: med stopped     Cephalosporins Rash     Lamotrigine Rash     Possibly associated with Lamictal.   HUT Comment: Possibly associated with Lamictal. ; HUT Reaction: Rash; HUT Reaction Type: Allergy; HUT Severity: Low; HUT Noted: [id number]     Latex Rash   .  Past Medical Hx:   Past Medical History:   Diagnosis Date     ADD (attention deficit disorder)      Anorexia nervosa with bulimia     history of; on Topamax     Anxiety      Borderline personality disorder (H)      Depression      Depressive disorder      H/O self-harm      Lives in independent group home     due to debilitating mental illness     Migraine without aura     no known triggers; on Topamax bid and Imitrex PRN     Morbid obesity (H)      PTSD (post-traumatic stress disorder)      Rectal foreign body - Recurrent issue, self placed      Swallowed foreign body - Recurrent issue, self placed       Baseline Mental status: WDL  Current Mental Status changes: at basesline    Infection present or suspected this encounter: no  Sepsis suspected: No  Isolation type: No active isolations     Activity level - Baseline/Home:  Independent  Activity Level - Current:   Independent    Bariatric equipment needed?: No    In the ED these meds were given:   Medications   naloxone (NARCAN) injection 0.1-0.4 mg (has no administration in time range)       Drips running?  No    Home pump  No    Current LDAs  Port A Cath Single 12/17/19 Right Chest wall (Active)   Site Assessment WDL 03/29/20 1938   Line Status Infusing 03/29/20 1938   Dressing Intervention Transparent 03/29/20 1938   Number of days: 103       ETT 7 (Active)   Number of days: 0       Incision/Surgical Site 10/07/19 Lower;Midline Abdomen (Active)   Number of days: 174       Labs results:   Labs Ordered and Resulted from Time of ED Arrival Up to the Time of Departure from the ED   BASIC METABOLIC PANEL - Abnormal; Notable for the following components:       Result  "Value    Chloride 110 (*)     Glucose 122 (*)     All other components within normal limits   CBC WITH PLATELETS DIFFERENTIAL - Abnormal; Notable for the following components:    Hemoglobin 11.6 (*)     MCHC 31.2 (*)     All other components within normal limits       Imaging Studies:   Recent Results (from the past 24 hour(s))   XR Abdomen Port 1 View   Result Value    Radiologist flags Foreign body ingestion (Urgent)    Narrative    XR ABDOMEN PORT 1 VW3/29/2020 4:20 PM    INDICATION: swallowed paperclip    COMPARISON:  1/18/2020    FINDINGS: AP view of the abdomen. A linear radiopaque density projects  over the mid abdomen, possibly in stomach, and measures 12.7 cm long.  Nonobstructive bowel gas pattern. No visualized pneumatosis or portal  venous gas. Lower chest is clear. No acute osseous abnormalities. A  catheter projects with tip overlying the high right atrium.      Impression    IMPRESSION: There is a linear radiopaque density projecting over the  mid abdomen which could potentially represent a straightened paperclip  given clinical history.    [Urgent Result: Foreign body ingestion]    Finding was identified on 3/29/2020 4:28 PM.     Dr. Velasquez was contacted by Dr. Pratt at 3/29/2020 4:35 PM and  verbalized understanding of the urgent finding.     I have personally reviewed the examination and initial interpretation  and I agree with the findings.    YEHUDA TROY MD       Recent vital signs:   /78   Pulse 85   Temp 97.7  F (36.5  C) (Oral)   Resp 16   Ht 1.575 m (5' 2\")   Wt 115.7 kg (255 lb)   SpO2 96%   BMI 46.64 kg/m      Eliazar Coma Scale Score: 15 (03/29/20 2058)       Cardiac Rhythm: Normal Sinus  Pt needs tele? No  Skin/wound Issues: None    Code Status: Full Code    Pain control: pt had none    Nausea control: pt had none    Abnormal labs/tests/findings requiring intervention: Swallowed an open paperclip    Family present during ED course? No   Family Comments/Social Situation " comments:     Tasks needing completion: None    Tea Mcfarlane, RN  [facility] --   [phone] [place] ED  [phone] [facility] ED

## 2020-03-30 NOTE — PROVIDER NOTIFICATION
03/30/20 0353   Patient Belongings   Did you bring any home meds/supplements to the hospital?  Yes   Disposition of meds  Sent to security/pharmacy per site process   Patient Belongings sent to security per site process   Patient Belongings Put in Hospital Secure Location (Security or Locker, etc.) cell phone/electronics;clothing;cash/credit card;keys;purse/wallet;shoes   Belongings Search Yes   Clothing Search Yes   [facility]   LOCKER  A pair of black shoes  Mu[employer]oz Hoddie  Green bra  Muñoz T- shirt  Apple grey cell phone and a white .  Keys  [place] D/L  Social Security Card  A brown handbag with a brown wallet that has various receipts and business cards.      SECURITY  [employer] Debit Visa CARD # [id number] Exp. 3/22  [facility] Debit Card # [id number] Exp. 9/23  [place] EBT Card # [id number]  Albuterol Inhaler - albuterol sulfate HFA  A               Admission:  I am responsible for any personal items that are not sent to the safe or pharmacy.  [facility] is not responsible for loss, theft or damage of any property in my possession.    Signature:  _________________________________ Date: _______  Time: _____                                              Staff Signature:  ____________________________ Date: ________  Time: _____      2nd Staff person, if patient is unable/unwilling to sign:    Signature: ________________________________ Date: ________  Time: _____     Discharge:  [facility] has returned all of my personal belongings:    Signature: _________________________________ Date: ________  Time: _____                                          Staff Signature:  ____________________________ Date: ________  Time: _____

## 2020-03-30 NOTE — PROGRESS NOTES
"   03/30/20 1159   General Information   Date Initially Attended OT 03/30/20     Attended 2/3 occupational therapy groups offered this date. Overall congruent affect and full engagement.    Occupation-based coping skill exploration group through mindful movement to facilitate stress management, awakening and/or relaxation. Education was provided on the use of stretching, exercise, and meditation practice as a coping tool within daily/weekly routine. Pt Response: Reported \"not really doing much lately\" when asked what she does to treat her body well. Independently followed and engaged in all of group. Verbalized feeling \"relaxing\" at the end of group and interest in additional mind-body interventions.   Pt attended >30 mins in a self-cares discussion group - No Charge.     OT assessment held d/t expected discharge today.   "

## 2020-03-30 NOTE — PLAN OF CARE
Pt is a 28 y/o female from  [facility] ED via EMS from home after swallowing  a paper clip due to anxiety from social isolation.     Per report , pt is with hx of ADD, anorexia, BPD, pulmonary embolism, PTSD,  current foreign body ingestion and rectal foreign body insertion.  Upper endoscopy was done to remove the paper clip which was stuck in her stomach. General anesthesia was used with ET intubation. Pt endorses a slight cough/tickle in her throat. Otherwise, denies all other COVID-19 signs or symptoms, denies being in contact with anyone who has been sick. Pt is known to hospital - frequently swallows foreign bodies requiring general anesthesia. GI recommendation that because of the current nature of corona virus pandemic, she needs a psych  admission with an ethics consult. They are concerned that when she starts swallowing items, she goes into a pattern of  doing similar issues multiple days.  They are concerned that she will put staff at risk as well as  utilize flory resources.     Upon admission to the unit, pt refused to wear mask as per advise from Epic. Updated on call  That pt endorses a slight cough or tickle, stated that is is probably due to her intubation, and however, per EMT, patient states that she was coughing prior to her visit at the ED. She was  highly agitated. Safety search was done. Unable to do admission interview. Pt was sent directly to her room. SIO initiated. Able to contract for safety while on the unit. Agreed to wearing the mask when she comes out of her room. Vitals taken. Temp at 98.3, no coughing, no SOB noted. On clear liquid diet.     On 72 HH. SI and SIB precautions. Single room.     Admission needs to be completed.

## 2020-03-30 NOTE — ANESTHESIA CARE TRANSFER NOTE
Patient: Nevin Alvarado    Procedure(s):  UPPER ENDOSCOPY WITH FOREIGN BODY REMOVAL    Diagnosis: Ingestion of foreign body [T18.9XXA]  Diagnosis Additional Information: No value filed.    Anesthesia Type:   General     Note:  Airway :Face Mask  Patient transferred to:PACU  Comments: To PACU, VSS, airway patent, RN at bedside, patient awake and alert.Handoff Report: Identifed the Patient, Identified the Reponsible Provider, Reviewed the pertinent medical history, Discussed the surgical course, Reviewed Intra-OP anesthesia mangement and issues during anesthesia, Set expectations for post-procedure period and Allowed opportunity for questions and acknowledgement of understanding      Vitals: (Last set prior to Anesthesia Care Transfer)    CRNA VITALS  3/29/2020 1901 - 3/29/2020 1941      3/29/2020             NIBP:  138/70    Pulse:  95    SpO2:  98 %    Resp Rate (observed):  (!) 1                Electronically Signed By: HU Ruby CRNA  March 29, 2020  7:41 PM

## 2020-03-30 NOTE — PROGRESS NOTES
Writer attempted to call pt's legal guardian x2 to clarify if she wanted the AVS for discharge sent to her e-mail. Left VM. Pt given information and 2 RN signatures witnessed.

## 2020-03-30 NOTE — DISCHARGE INSTRUCTIONS
Behavioral Discharge Planning and Instructions    Summary:  You were admitted on 3/29/2020  due to Foreign object ingestion.  You were treated by Dr. Marian Ledesma MD and discharged on 03/30/2020 from [facility] to Home at [address] [place] [address].     Principal Diagnosis:   Borderline Personality Disorder  Anxiety  Depression  Post Traumatic Stress Disorder      Health Care Follow-up Appointments:   Medication Management   Wednesday, April 8th at 8:20am with Dr. Brionna Lopez [facility]   [address] [address]    [place] [address]    Phone: [phone]    Fax: [phone]   Please be sure to bring your insurance card and ID to present to the reception staff. If you need to reschedule this appointment at any point, please do so at least 24 hours prior to the start of the scheduled appointment.     [facility]   Wednesday, April 1st   [facility]   [address] [place] [address]  Phone: [phone]  Fax: [phone]  Please be sure to bring your insurance card and ID to present to the reception staff. If you need to reschedule this appointment at any point, please do so at least 24 hours prior to the start of the scheduled appointment.     Attend all scheduled appointments with your outpatient providers. Call at least 24 hours in advance if you need to reschedule an appointment to ensure continued access to your outpatient providers.   Major Treatments, Procedures and Findings:  You were provided with: a psychiatric assessment, assessed for medical stability, medication evaluation and/or management, group therapy and milieu management    Symptoms to Report: feeling more aggressive, increased confusion, losing more sleep, mood getting worse or thoughts of suicide    Early warning signs can include: increased depression or anxiety sleep disturbances increased thoughts or behaviors of suicide or self-harm  increased unusual thinking, such as paranoia or  "hearing voices    Safety and Wellness:  Take all medicines as directed.  Make no changes unless your doctor suggests them. Follow treatment recommendations.  Refrain from alcohol and non-prescribed drugs. Ask your support system to help you reduce your access to items that could harm yourself or others. Items could include:   - Firearms  - Medicines (both prescribed and over-the-counter)  - Knives and other sharp objects  - Ropes and like materials  - Car keys  If there is a concern for safety, call 911. If there is a concern for safety, call 911.    Resources:   [facility] Crisis Response - Adult 552-109-3920  Crisis Intervention: 164.808.7339 or 499-698-0562 (TTY: 908.266.4780).  Call anytime for help.  National [place] on Mental Illness (www.[place].darnell.org): 150.159.6363 or 104-447-6825.  Suicide Awareness Voices of Education (SAVE) (www.save.org): 929-947-MWVA (7450)  National Suicide Prevention Line (www.mentalhealthmn.org): 433-079-XVEF (1359)  Mental Health Consumer/Survivor Network of [place] (www.mhcsn.net): 694.879.4439 or 682-728-3169  Mental Health Association of [place] (www.mentalhealth.org): 328.171.7058 or 869-809-1240  Self- Management and Recovery Training., SMART-- Toll free: 578.345.4779  www.Kngroo.Medio  Text 4 Life: txt \"LIFE\" to 73701 for immediate support and crisis intervention  Crisis text line: Text \"[place]\" to 232703. Free, confidential, 24/7.  Crisis Intervention: 990.972.7653 or 370-662-1478. Call anytime for help.     The treatment team has appreciated the opportunity to work with you.     If you have any questions or concerns our unit number is [phone].   If after you are discharged and in need documentation related to this hospitalization, please call medical records at [phone].  "

## 2020-03-31 ENCOUNTER — TELEPHONE (OUTPATIENT)
Dept: INTERNAL MEDICINE | Facility: CLINIC | Age: 30
End: 2020-03-31

## 2020-07-11 ENCOUNTER — APPOINTMENT (OUTPATIENT)
Dept: GENERAL RADIOLOGY | Facility: CLINIC | Age: 30
DRG: 394 | End: 2020-07-11
Attending: EMERGENCY MEDICINE
Payer: COMMERCIAL

## 2020-07-11 ENCOUNTER — ANESTHESIA (OUTPATIENT)
Dept: SURGERY | Facility: CLINIC | Age: 30
DRG: 394 | End: 2020-07-11
Payer: COMMERCIAL

## 2020-07-11 ENCOUNTER — HOSPITAL ENCOUNTER (INPATIENT)
Facility: CLINIC | Age: 30
LOS: 1 days | Discharge: HOME | DRG: 394 | End: 2020-07-12
Attending: EMERGENCY MEDICINE | Admitting: INTERNAL MEDICINE
Payer: COMMERCIAL

## 2020-07-11 ENCOUNTER — ANESTHESIA EVENT (OUTPATIENT)
Dept: SURGERY | Facility: CLINIC | Age: 30
DRG: 394 | End: 2020-07-11
Payer: COMMERCIAL

## 2020-07-11 DIAGNOSIS — F60.3 EXPLOSIVE PERSONALITY DISORDER (H): ICD-10-CM

## 2020-07-11 DIAGNOSIS — Z03.818 ENCNTR FOR OBS FOR SUSP EXPSR TO OTH BIOLG AGENTS RULED OUT: ICD-10-CM

## 2020-07-11 DIAGNOSIS — T18.9XXA SWALLOWED FOREIGN BODY, INITIAL ENCOUNTER: ICD-10-CM

## 2020-07-11 DIAGNOSIS — T18.9XXD SWALLOWED FOREIGN BODY, SUBSEQUENT ENCOUNTER: Primary | ICD-10-CM

## 2020-07-11 DIAGNOSIS — F41.1 GENERALIZED ANXIETY DISORDER: ICD-10-CM

## 2020-07-11 LAB
ABO + RH BLD: ABNORMAL
ABO + RH BLD: ABNORMAL
ALBUMIN SERPL-MCNC: 3.5 G/DL (ref 3.4–5)
ALP SERPL-CCNC: 86 U/L (ref 40–150)
ALT SERPL W P-5'-P-CCNC: 31 U/L (ref 0–50)
ANION GAP SERPL CALCULATED.3IONS-SCNC: 5 MMOL/L (ref 3–14)
APTT PPP: 33 SEC (ref 22–37)
AST SERPL W P-5'-P-CCNC: 20 U/L (ref 0–45)
BASOPHILS # BLD AUTO: 0 10E9/L (ref 0–0.2)
BASOPHILS NFR BLD AUTO: 0.3 %
BILIRUB SERPL-MCNC: 0.3 MG/DL (ref 0.2–1.3)
BLD GP AB SCN SERPL QL: ABNORMAL
BLOOD BANK CMNT PATIENT-IMP: ABNORMAL
BLOOD BANK CMNT PATIENT-IMP: ABNORMAL
BUN SERPL-MCNC: 9 MG/DL (ref 7–30)
CALCIUM SERPL-MCNC: 8.9 MG/DL (ref 8.5–10.1)
CHLORIDE SERPL-SCNC: 109 MMOL/L (ref 94–109)
CO2 SERPL-SCNC: 24 MMOL/L (ref 20–32)
CREAT SERPL-MCNC: 0.62 MG/DL (ref 0.52–1.04)
DIFFERENTIAL METHOD BLD: NORMAL
EOSINOPHIL # BLD AUTO: 0.1 10E9/L (ref 0–0.7)
EOSINOPHIL NFR BLD AUTO: 1.1 %
ERYTHROCYTE [DISTWIDTH] IN BLOOD BY AUTOMATED COUNT: 14.2 % (ref 10–15)
GFR SERPL CREATININE-BSD FRML MDRD: >90 ML/MIN/{1.73_M2}
GLUCOSE SERPL-MCNC: 94 MG/DL (ref 70–99)
HCG UR QL: NEGATIVE
HCT VFR BLD AUTO: 38.8 % (ref 35–47)
HGB BLD-MCNC: 12.3 G/DL (ref 11.7–15.7)
IMM GRANULOCYTES # BLD: 0 10E9/L (ref 0–0.4)
IMM GRANULOCYTES NFR BLD: 0.3 %
INR PPP: 1.05 (ref 0.86–1.14)
INTERNAL QC OK POCT: YES
LYMPHOCYTES # BLD AUTO: 1.6 10E9/L (ref 0.8–5.3)
LYMPHOCYTES NFR BLD AUTO: 15.2 %
MCH RBC QN AUTO: 27.5 PG (ref 26.5–33)
MCHC RBC AUTO-ENTMCNC: 31.7 G/DL (ref 31.5–36.5)
MCV RBC AUTO: 87 FL (ref 78–100)
MONOCYTES # BLD AUTO: 0.6 10E9/L (ref 0–1.3)
MONOCYTES NFR BLD AUTO: 5.3 %
NEUTROPHILS # BLD AUTO: 8.2 10E9/L (ref 1.6–8.3)
NEUTROPHILS NFR BLD AUTO: 77.8 %
NRBC # BLD AUTO: 0 10*3/UL
NRBC BLD AUTO-RTO: 0 /100
PLATELET # BLD AUTO: 255 10E9/L (ref 150–450)
POTASSIUM SERPL-SCNC: 3.4 MMOL/L (ref 3.4–5.3)
PROT SERPL-MCNC: 8.8 G/DL (ref 6.8–8.8)
RBC # BLD AUTO: 4.47 10E12/L (ref 3.8–5.2)
SODIUM SERPL-SCNC: 138 MMOL/L (ref 133–144)
SPECIMEN EXP DATE BLD: ABNORMAL
UPPER GI ENDOSCOPY: NORMAL
WBC # BLD AUTO: 10.5 10E9/L (ref 4–11)

## 2020-07-11 PROCEDURE — 86850 RBC ANTIBODY SCREEN: CPT | Performed by: EMERGENCY MEDICINE

## 2020-07-11 PROCEDURE — 25800030 ZZH RX IP 258 OP 636: Performed by: NURSE ANESTHETIST, CERTIFIED REGISTERED

## 2020-07-11 PROCEDURE — 99223 1ST HOSP IP/OBS HIGH 75: CPT | Mod: AI | Performed by: INTERNAL MEDICINE

## 2020-07-11 PROCEDURE — 37000009 ZZH ANESTHESIA TECHNICAL FEE, EACH ADDTL 15 MIN: Performed by: INTERNAL MEDICINE

## 2020-07-11 PROCEDURE — 85610 PROTHROMBIN TIME: CPT | Performed by: EMERGENCY MEDICINE

## 2020-07-11 PROCEDURE — 90791 PSYCH DIAGNOSTIC EVALUATION: CPT

## 2020-07-11 PROCEDURE — 37000008 ZZH ANESTHESIA TECHNICAL FEE, 1ST 30 MIN: Performed by: INTERNAL MEDICINE

## 2020-07-11 PROCEDURE — 96374 THER/PROPH/DIAG INJ IV PUSH: CPT | Performed by: EMERGENCY MEDICINE

## 2020-07-11 PROCEDURE — 25000128 H RX IP 250 OP 636: Performed by: ANESTHESIOLOGY

## 2020-07-11 PROCEDURE — 36000053 ZZH SURGERY LEVEL 2 EA 15 ADDTL MIN - UMMC: Performed by: INTERNAL MEDICINE

## 2020-07-11 PROCEDURE — 85730 THROMBOPLASTIN TIME PARTIAL: CPT | Performed by: EMERGENCY MEDICINE

## 2020-07-11 PROCEDURE — 74018 RADEX ABDOMEN 1 VIEW: CPT

## 2020-07-11 PROCEDURE — 86900 BLOOD TYPING SEROLOGIC ABO: CPT | Performed by: EMERGENCY MEDICINE

## 2020-07-11 PROCEDURE — 36000051 ZZH SURGERY LEVEL 2 1ST 30 MIN - UMMC: Performed by: INTERNAL MEDICINE

## 2020-07-11 PROCEDURE — 81025 URINE PREGNANCY TEST: CPT | Performed by: EMERGENCY MEDICINE

## 2020-07-11 PROCEDURE — 99285 EMERGENCY DEPT VISIT HI MDM: CPT | Mod: 25 | Performed by: EMERGENCY MEDICINE

## 2020-07-11 PROCEDURE — 25000125 ZZHC RX 250: Performed by: NURSE ANESTHETIST, CERTIFIED REGISTERED

## 2020-07-11 PROCEDURE — 80053 COMPREHEN METABOLIC PANEL: CPT | Performed by: EMERGENCY MEDICINE

## 2020-07-11 PROCEDURE — 27210794 ZZH OR GENERAL SUPPLY STERILE: Performed by: INTERNAL MEDICINE

## 2020-07-11 PROCEDURE — 86901 BLOOD TYPING SEROLOGIC RH(D): CPT | Performed by: EMERGENCY MEDICINE

## 2020-07-11 PROCEDURE — C9803 HOPD COVID-19 SPEC COLLECT: HCPCS | Performed by: EMERGENCY MEDICINE

## 2020-07-11 PROCEDURE — 0DC68ZZ EXTIRPATION OF MATTER FROM STOMACH, VIA NATURAL OR ARTIFICIAL OPENING ENDOSCOPIC: ICD-10-PCS | Performed by: INTERNAL MEDICINE

## 2020-07-11 PROCEDURE — 85025 COMPLETE CBC W/AUTO DIFF WBC: CPT | Performed by: EMERGENCY MEDICINE

## 2020-07-11 PROCEDURE — 25800030 ZZH RX IP 258 OP 636: Performed by: NURSE PRACTITIONER

## 2020-07-11 PROCEDURE — 25000125 ZZHC RX 250: Performed by: INTERNAL MEDICINE

## 2020-07-11 PROCEDURE — 12000001 ZZH R&B MED SURG/OB UMMC

## 2020-07-11 PROCEDURE — 25000128 H RX IP 250 OP 636: Performed by: NURSE ANESTHETIST, CERTIFIED REGISTERED

## 2020-07-11 PROCEDURE — 71000014 ZZH RECOVERY PHASE 1 LEVEL 2 FIRST HR: Performed by: INTERNAL MEDICINE

## 2020-07-11 PROCEDURE — 25000128 H RX IP 250 OP 636: Performed by: EMERGENCY MEDICINE

## 2020-07-11 PROCEDURE — U0003 INFECTIOUS AGENT DETECTION BY NUCLEIC ACID (DNA OR RNA); SEVERE ACUTE RESPIRATORY SYNDROME CORONAVIRUS 2 (SARS-COV-2) (CORONAVIRUS DISEASE [COVID-19]), AMPLIFIED PROBE TECHNIQUE, MAKING USE OF HIGH THROUGHPUT TECHNOLOGIES AS DESCRIBED BY CMS-2020-01-R: HCPCS | Performed by: EMERGENCY MEDICINE

## 2020-07-11 PROCEDURE — 99207 ZZC APP CREDIT; MD BILLING SHARED VISIT: CPT | Performed by: NURSE PRACTITIONER

## 2020-07-11 PROCEDURE — 99285 EMERGENCY DEPT VISIT HI MDM: CPT | Mod: Z6 | Performed by: EMERGENCY MEDICINE

## 2020-07-11 PROCEDURE — 25000566 ZZH SEVOFLURANE, EA 15 MIN: Performed by: INTERNAL MEDICINE

## 2020-07-11 RX ORDER — PREGABALIN 100 MG/1
100 CAPSULE ORAL 3 TIMES DAILY
Status: ON HOLD | COMMUNITY
End: 2022-02-16

## 2020-07-11 RX ORDER — SODIUM CHLORIDE, SODIUM LACTATE, POTASSIUM CHLORIDE, CALCIUM CHLORIDE 600; 310; 30; 20 MG/100ML; MG/100ML; MG/100ML; MG/100ML
INJECTION, SOLUTION INTRAVENOUS CONTINUOUS
Status: DISCONTINUED | OUTPATIENT
Start: 2020-07-11 | End: 2020-07-11

## 2020-07-11 RX ORDER — FENTANYL CITRATE 50 UG/ML
25-50 INJECTION, SOLUTION INTRAMUSCULAR; INTRAVENOUS
Status: DISCONTINUED | OUTPATIENT
Start: 2020-07-11 | End: 2020-07-12 | Stop reason: HOSPADM

## 2020-07-11 RX ORDER — EPHEDRINE SULFATE 50 MG/ML
INJECTION, SOLUTION INTRAMUSCULAR; INTRAVENOUS; SUBCUTANEOUS PRN
Status: DISCONTINUED | OUTPATIENT
Start: 2020-07-11 | End: 2020-07-11

## 2020-07-11 RX ORDER — SODIUM CHLORIDE, SODIUM LACTATE, POTASSIUM CHLORIDE, CALCIUM CHLORIDE 600; 310; 30; 20 MG/100ML; MG/100ML; MG/100ML; MG/100ML
INJECTION, SOLUTION INTRAVENOUS CONTINUOUS
Status: DISCONTINUED | OUTPATIENT
Start: 2020-07-11 | End: 2020-07-12 | Stop reason: HOSPADM

## 2020-07-11 RX ORDER — NALBUPHINE HYDROCHLORIDE 20 MG/ML
5 INJECTION, SOLUTION INTRAMUSCULAR; INTRAVENOUS; SUBCUTANEOUS
Status: COMPLETED | OUTPATIENT
Start: 2020-07-11 | End: 2020-07-11

## 2020-07-11 RX ORDER — HYDROMORPHONE HYDROCHLORIDE 1 MG/ML
.3-.5 INJECTION, SOLUTION INTRAMUSCULAR; INTRAVENOUS; SUBCUTANEOUS EVERY 5 MIN PRN
Status: DISCONTINUED | OUTPATIENT
Start: 2020-07-11 | End: 2020-07-12 | Stop reason: HOSPADM

## 2020-07-11 RX ORDER — ONDANSETRON 2 MG/ML
4 INJECTION INTRAMUSCULAR; INTRAVENOUS EVERY 30 MIN PRN
Status: DISCONTINUED | OUTPATIENT
Start: 2020-07-11 | End: 2020-07-12 | Stop reason: HOSPADM

## 2020-07-11 RX ORDER — HYDROXYCHLOROQUINE SULFATE 200 MG/1
200 TABLET, FILM COATED ORAL 2 TIMES DAILY
Status: ON HOLD | COMMUNITY
End: 2022-02-16

## 2020-07-11 RX ORDER — DEXAMETHASONE SODIUM PHOSPHATE 4 MG/ML
INJECTION, SOLUTION INTRA-ARTICULAR; INTRALESIONAL; INTRAMUSCULAR; INTRAVENOUS; SOFT TISSUE PRN
Status: DISCONTINUED | OUTPATIENT
Start: 2020-07-11 | End: 2020-07-11

## 2020-07-11 RX ORDER — PROPOFOL 10 MG/ML
INJECTION, EMULSION INTRAVENOUS PRN
Status: DISCONTINUED | OUTPATIENT
Start: 2020-07-11 | End: 2020-07-11

## 2020-07-11 RX ORDER — LIDOCAINE HYDROCHLORIDE 20 MG/ML
INJECTION, SOLUTION INFILTRATION; PERINEURAL PRN
Status: DISCONTINUED | OUTPATIENT
Start: 2020-07-11 | End: 2020-07-11

## 2020-07-11 RX ORDER — NALOXONE HYDROCHLORIDE 0.4 MG/ML
.1-.4 INJECTION, SOLUTION INTRAMUSCULAR; INTRAVENOUS; SUBCUTANEOUS
Status: DISCONTINUED | OUTPATIENT
Start: 2020-07-11 | End: 2020-07-12 | Stop reason: HOSPADM

## 2020-07-11 RX ORDER — ONDANSETRON 4 MG/1
4 TABLET, ORALLY DISINTEGRATING ORAL EVERY 30 MIN PRN
Status: DISCONTINUED | OUTPATIENT
Start: 2020-07-11 | End: 2020-07-12 | Stop reason: HOSPADM

## 2020-07-11 RX ORDER — DIPHENHYDRAMINE HYDROCHLORIDE 50 MG/ML
INJECTION INTRAMUSCULAR; INTRAVENOUS PRN
Status: DISCONTINUED | OUTPATIENT
Start: 2020-07-11 | End: 2020-07-11

## 2020-07-11 RX ORDER — MORPHINE SULFATE 4 MG/ML
4 INJECTION, SOLUTION INTRAMUSCULAR; INTRAVENOUS
Status: COMPLETED | OUTPATIENT
Start: 2020-07-11 | End: 2020-07-11

## 2020-07-11 RX ORDER — ONDANSETRON 2 MG/ML
INJECTION INTRAMUSCULAR; INTRAVENOUS PRN
Status: DISCONTINUED | OUTPATIENT
Start: 2020-07-11 | End: 2020-07-11

## 2020-07-11 RX ORDER — ACETAMINOPHEN AND CODEINE PHOSPHATE 120; 12 MG/5ML; MG/5ML
0.35 SOLUTION ORAL DAILY
COMMUNITY
End: 2020-10-31

## 2020-07-11 RX ORDER — FENTANYL CITRATE 50 UG/ML
INJECTION, SOLUTION INTRAMUSCULAR; INTRAVENOUS PRN
Status: DISCONTINUED | OUTPATIENT
Start: 2020-07-11 | End: 2020-07-11

## 2020-07-11 RX ADMIN — HYDROMORPHONE HYDROCHLORIDE 0.4 MG: 1 INJECTION, SOLUTION INTRAMUSCULAR; INTRAVENOUS; SUBCUTANEOUS at 23:45

## 2020-07-11 RX ADMIN — FENTANYL CITRATE 50 MCG: 50 INJECTION, SOLUTION INTRAMUSCULAR; INTRAVENOUS at 23:29

## 2020-07-11 RX ADMIN — ROCURONIUM BROMIDE 50 MG: 10 INJECTION INTRAVENOUS at 21:58

## 2020-07-11 RX ADMIN — FENTANYL CITRATE 50 MCG: 50 INJECTION, SOLUTION INTRAMUSCULAR; INTRAVENOUS at 23:14

## 2020-07-11 RX ADMIN — ONDANSETRON 4 MG: 2 INJECTION INTRAMUSCULAR; INTRAVENOUS at 22:21

## 2020-07-11 RX ADMIN — SODIUM CHLORIDE, POTASSIUM CHLORIDE, SODIUM LACTATE AND CALCIUM CHLORIDE: 600; 310; 30; 20 INJECTION, SOLUTION INTRAVENOUS at 21:50

## 2020-07-11 RX ADMIN — LIDOCAINE HYDROCHLORIDE 100 MG: 20 INJECTION, SOLUTION INFILTRATION; PERINEURAL at 21:57

## 2020-07-11 RX ADMIN — MIDAZOLAM 2 MG: 1 INJECTION INTRAMUSCULAR; INTRAVENOUS at 21:57

## 2020-07-11 RX ADMIN — Medication 5 MG: at 22:01

## 2020-07-11 RX ADMIN — DIPHENHYDRAMINE HYDROCHLORIDE 50 MG: 50 INJECTION, SOLUTION INTRAMUSCULAR; INTRAVENOUS at 22:11

## 2020-07-11 RX ADMIN — SUGAMMADEX 200 MG: 100 INJECTION, SOLUTION INTRAVENOUS at 22:26

## 2020-07-11 RX ADMIN — FENTANYL CITRATE 100 MCG: 50 INJECTION, SOLUTION INTRAMUSCULAR; INTRAVENOUS at 21:57

## 2020-07-11 RX ADMIN — ONDANSETRON 4 MG: 2 INJECTION INTRAMUSCULAR; INTRAVENOUS at 23:17

## 2020-07-11 RX ADMIN — MORPHINE SULFATE 4 MG: 4 INJECTION INTRAVENOUS at 20:04

## 2020-07-11 RX ADMIN — Medication 100 MG: at 21:58

## 2020-07-11 RX ADMIN — PHENYLEPHRINE HYDROCHLORIDE 100 MCG: 10 INJECTION INTRAVENOUS at 22:01

## 2020-07-11 RX ADMIN — HYDROMORPHONE HYDROCHLORIDE 0.3 MG: 1 INJECTION, SOLUTION INTRAMUSCULAR; INTRAVENOUS; SUBCUTANEOUS at 23:36

## 2020-07-11 RX ADMIN — NALBUPHINE HYDROCHLORIDE 5 MG: 20 INJECTION, SOLUTION INTRAMUSCULAR; INTRAVENOUS; SUBCUTANEOUS at 23:41

## 2020-07-11 RX ADMIN — NALBUPHINE HYDROCHLORIDE 5 MG: 20 INJECTION, SOLUTION INTRAMUSCULAR; INTRAVENOUS; SUBCUTANEOUS at 23:27

## 2020-07-11 RX ADMIN — PROPOFOL 200 MG: 10 INJECTION, EMULSION INTRAVENOUS at 21:57

## 2020-07-11 RX ADMIN — DEXAMETHASONE SODIUM PHOSPHATE 6 MG: 4 INJECTION, SOLUTION INTRA-ARTICULAR; INTRALESIONAL; INTRAMUSCULAR; INTRAVENOUS; SOFT TISSUE at 22:11

## 2020-07-11 ASSESSMENT — PAIN DESCRIPTION - DESCRIPTORS
DESCRIPTORS: ACHING
DESCRIPTORS: ACHING;SHARP

## 2020-07-11 NOTE — ED TRIAGE NOTES
Patient presents via EMS for c/o swallowed foreign body. Patient states she became anxious today and swallowed a paper clip. Patient denies SOB, c/o scratchy throat and abdominal discomfort.

## 2020-07-12 VITALS
TEMPERATURE: 96.6 F | DIASTOLIC BLOOD PRESSURE: 81 MMHG | BODY MASS INDEX: 47.9 KG/M2 | SYSTOLIC BLOOD PRESSURE: 125 MMHG | RESPIRATION RATE: 16 BRPM | WEIGHT: 261.9 LBS | OXYGEN SATURATION: 97 % | HEART RATE: 74 BPM

## 2020-07-12 PROCEDURE — 25000128 H RX IP 250 OP 636: Performed by: INTERNAL MEDICINE

## 2020-07-12 PROCEDURE — 99239 HOSP IP/OBS DSCHRG MGMT >30: CPT | Performed by: INTERNAL MEDICINE

## 2020-07-12 PROCEDURE — 25000132 ZZH RX MED GY IP 250 OP 250 PS 637: Performed by: INTERNAL MEDICINE

## 2020-07-12 PROCEDURE — 25000132 ZZH RX MED GY IP 250 OP 250 PS 637: Performed by: NURSE PRACTITIONER

## 2020-07-12 PROCEDURE — 99222 1ST HOSP IP/OBS MODERATE 55: CPT | Performed by: PSYCHIATRY & NEUROLOGY

## 2020-07-12 RX ORDER — DESVENLAFAXINE 50 MG/1
100 TABLET, FILM COATED, EXTENDED RELEASE ORAL DAILY
Status: DISCONTINUED | OUTPATIENT
Start: 2020-07-12 | End: 2020-07-12 | Stop reason: HOSPADM

## 2020-07-12 RX ORDER — PROCHLORPERAZINE MALEATE 10 MG
10 TABLET ORAL EVERY 6 HOURS PRN
Status: DISCONTINUED | OUTPATIENT
Start: 2020-07-12 | End: 2020-07-12 | Stop reason: HOSPADM

## 2020-07-12 RX ORDER — ACETAMINOPHEN AND CODEINE PHOSPHATE 120; 12 MG/5ML; MG/5ML
0.35 SOLUTION ORAL DAILY
Status: DISCONTINUED | OUTPATIENT
Start: 2020-07-12 | End: 2020-07-12 | Stop reason: HOSPADM

## 2020-07-12 RX ORDER — ONDANSETRON 4 MG/1
4 TABLET, ORALLY DISINTEGRATING ORAL EVERY 6 HOURS PRN
Status: DISCONTINUED | OUTPATIENT
Start: 2020-07-12 | End: 2020-07-12 | Stop reason: HOSPADM

## 2020-07-12 RX ORDER — HEPARIN SODIUM,PORCINE 10 UNIT/ML
5-10 VIAL (ML) INTRAVENOUS EVERY 24 HOURS
Status: DISCONTINUED | OUTPATIENT
Start: 2020-07-12 | End: 2020-07-12 | Stop reason: HOSPADM

## 2020-07-12 RX ORDER — LIDOCAINE 40 MG/G
CREAM TOPICAL
Status: DISCONTINUED | OUTPATIENT
Start: 2020-07-12 | End: 2020-07-12 | Stop reason: HOSPADM

## 2020-07-12 RX ORDER — PREGABALIN 50 MG/1
50 CAPSULE ORAL 3 TIMES DAILY
Status: DISCONTINUED | OUTPATIENT
Start: 2020-07-12 | End: 2020-07-12 | Stop reason: HOSPADM

## 2020-07-12 RX ORDER — METFORMIN HCL 500 MG
1000 TABLET, EXTENDED RELEASE 24 HR ORAL
Status: DISCONTINUED | OUTPATIENT
Start: 2020-07-12 | End: 2020-07-12 | Stop reason: HOSPADM

## 2020-07-12 RX ORDER — BISACODYL 5 MG
5 TABLET, DELAYED RELEASE (ENTERIC COATED) ORAL DAILY PRN
Status: DISCONTINUED | OUTPATIENT
Start: 2020-07-12 | End: 2020-07-12 | Stop reason: HOSPADM

## 2020-07-12 RX ORDER — HEPARIN SODIUM (PORCINE) LOCK FLUSH IV SOLN 100 UNIT/ML 100 UNIT/ML
5 SOLUTION INTRAVENOUS
Status: DISCONTINUED | OUTPATIENT
Start: 2020-07-12 | End: 2020-07-12 | Stop reason: HOSPADM

## 2020-07-12 RX ORDER — HYDROXYCHLOROQUINE SULFATE 200 MG/1
200 TABLET, FILM COATED ORAL 2 TIMES DAILY
Status: DISCONTINUED | OUTPATIENT
Start: 2020-07-12 | End: 2020-07-12 | Stop reason: HOSPADM

## 2020-07-12 RX ORDER — PROCHLORPERAZINE 25 MG
25 SUPPOSITORY, RECTAL RECTAL EVERY 12 HOURS PRN
Status: DISCONTINUED | OUTPATIENT
Start: 2020-07-12 | End: 2020-07-12 | Stop reason: HOSPADM

## 2020-07-12 RX ORDER — BISACODYL 5 MG
15 TABLET, DELAYED RELEASE (ENTERIC COATED) ORAL DAILY PRN
Status: DISCONTINUED | OUTPATIENT
Start: 2020-07-12 | End: 2020-07-12 | Stop reason: HOSPADM

## 2020-07-12 RX ORDER — BISACODYL 5 MG
10 TABLET, DELAYED RELEASE (ENTERIC COATED) ORAL DAILY PRN
Status: DISCONTINUED | OUTPATIENT
Start: 2020-07-12 | End: 2020-07-12 | Stop reason: HOSPADM

## 2020-07-12 RX ORDER — HYDROXYZINE HYDROCHLORIDE 25 MG/1
50 TABLET, FILM COATED ORAL 3 TIMES DAILY PRN
Status: DISCONTINUED | OUTPATIENT
Start: 2020-07-12 | End: 2020-07-12 | Stop reason: HOSPADM

## 2020-07-12 RX ORDER — ACETAMINOPHEN 325 MG/1
650 TABLET ORAL EVERY 4 HOURS PRN
Status: DISCONTINUED | OUTPATIENT
Start: 2020-07-12 | End: 2020-07-12 | Stop reason: HOSPADM

## 2020-07-12 RX ORDER — ALBUTEROL SULFATE 90 UG/1
2 AEROSOL, METERED RESPIRATORY (INHALATION) EVERY 4 HOURS PRN
COMMUNITY
End: 2022-01-14

## 2020-07-12 RX ORDER — BUSPIRONE HYDROCHLORIDE 10 MG/1
10 TABLET ORAL 2 TIMES DAILY
Status: DISCONTINUED | OUTPATIENT
Start: 2020-07-12 | End: 2020-07-12 | Stop reason: HOSPADM

## 2020-07-12 RX ORDER — TOPIRAMATE 50 MG/1
50 TABLET, FILM COATED ORAL AT BEDTIME
Status: DISCONTINUED | OUTPATIENT
Start: 2020-07-12 | End: 2020-07-12 | Stop reason: HOSPADM

## 2020-07-12 RX ORDER — DIPHENHYDRAMINE HCL 12.5MG/5ML
25 LIQUID (ML) ORAL EVERY 6 HOURS PRN
Status: DISCONTINUED | OUTPATIENT
Start: 2020-07-12 | End: 2020-07-12 | Stop reason: HOSPADM

## 2020-07-12 RX ORDER — CLONIDINE HYDROCHLORIDE 0.1 MG/1
0.1 TABLET ORAL 2 TIMES DAILY
Status: DISCONTINUED | OUTPATIENT
Start: 2020-07-12 | End: 2020-07-12 | Stop reason: HOSPADM

## 2020-07-12 RX ORDER — NALOXONE HYDROCHLORIDE 0.4 MG/ML
.1-.4 INJECTION, SOLUTION INTRAMUSCULAR; INTRAVENOUS; SUBCUTANEOUS
Status: DISCONTINUED | OUTPATIENT
Start: 2020-07-12 | End: 2020-07-12 | Stop reason: HOSPADM

## 2020-07-12 RX ORDER — ONDANSETRON 2 MG/ML
4 INJECTION INTRAMUSCULAR; INTRAVENOUS EVERY 6 HOURS PRN
Status: DISCONTINUED | OUTPATIENT
Start: 2020-07-12 | End: 2020-07-12 | Stop reason: HOSPADM

## 2020-07-12 RX ORDER — PANTOPRAZOLE SODIUM 40 MG/1
40 TABLET, DELAYED RELEASE ORAL DAILY
Qty: 30 TABLET | Refills: 1 | Status: SHIPPED | OUTPATIENT
Start: 2020-07-12 | End: 2020-10-31

## 2020-07-12 RX ORDER — VITAMIN A ACETATE, .BETA.-CAROTENE, ASCORBIC ACID, CHOLECALCIFEROL, .ALPHA.-TOCOPHEROL ACETATE, DL-, THIAMINE MONONITRATE, RIBOFLAVIN, NIACINAMIDE, PYRIDOXINE HYDROCHLORIDE, FOLIC ACID, CYANOCOBALAMIN, CALCIUM CARBONATE, FERROUS FUMARATE, ZINC OXIDE, AND CUPRIC OXIDE 2000; 2000; 120; 400; 22; 1.84; 3; 20; 10; 1; 12; 200; 27; 25; 2 [IU]/1; [IU]/1; MG/1; [IU]/1; MG/1; MG/1; MG/1; MG/1; MG/1; MG/1; UG/1; MG/1; MG/1; MG/1; MG/1
1 TABLET ORAL DAILY
COMMUNITY
End: 2020-11-28

## 2020-07-12 RX ADMIN — DESVENLAFAXINE 100 MG: 50 TABLET, FILM COATED, EXTENDED RELEASE ORAL at 08:25

## 2020-07-12 RX ADMIN — CLONIDINE HYDROCHLORIDE 0.1 MG: 0.1 TABLET ORAL at 08:25

## 2020-07-12 RX ADMIN — HYDROXYCHLOROQUINE SULFATE 200 MG: 200 TABLET, FILM COATED ORAL at 08:25

## 2020-07-12 RX ADMIN — PREGABALIN 50 MG: 50 CAPSULE ORAL at 08:25

## 2020-07-12 RX ADMIN — Medication 5 ML: at 10:53

## 2020-07-12 RX ADMIN — DIPHENHYDRAMINE HYDROCHLORIDE 25 MG: 25 SOLUTION ORAL at 04:03

## 2020-07-12 RX ADMIN — BUSPIRONE HYDROCHLORIDE 10 MG: 10 TABLET ORAL at 08:25

## 2020-07-12 RX ADMIN — ACETAMINOPHEN 650 MG: 325 TABLET, FILM COATED ORAL at 02:17

## 2020-07-12 ASSESSMENT — ACTIVITIES OF DAILY LIVING (ADL)
ADLS_ACUITY_SCORE: 11

## 2020-07-12 ASSESSMENT — PAIN DESCRIPTION - DESCRIPTORS: DESCRIPTORS: ACHING;SHARP

## 2020-07-12 NOTE — CONSULTS
GASTROENTEROLOGY CONSULTATION      Date of Admission: 7/11/2020       Date of Consult: 7/11/2020          Chief Complaint:   We were asked by Ken Muniz MD to evaluate this patient with FB ingestion         ASSESSMENT AND RECOMMENDATIONS:   Assessment:  Nevin Alvarado is a 29 year old female with a history of  anxiety and frequent foreign body ingestion who presents after swallowing a straightened paper clip. AXR revealed spring in the stomach.   EGD in OR confirmed FB in stomach, removed with use of hook and rat-toothed forceps w/o complications. Exam also notable for benign mild mid-esophageal stricture and small erosions in gastric body likely caused by FB tip.     Recommendations  - Admit to medicine tonight and have psychiatry evaluate in am   - Clear liquid diet - can advance in AM if doing well  - Analgesics per primary team  - GI will sign off, please call for questions        Patient care plan discussed with Dr. Mendoza, GI staff physician. Thank you for involving us in this patient's care. Please do not hesitate to contact the GI service with any questions or concerns.     Keith Valdez MD  GI Fellow  #[id number]  -------------------------------------------------------------------------------------------------------------------   History is obtained from the patient and the medical record.          History of Present Illness:   Nevin Alvarado is a 29 year old female with a history of  anxiety and frequent foreign body ingestion who presents after swallowing a straightened paper clip. AXR revealed spring in the stomach.    Patient had an episode of anxiety this afternoon and ingested a paper clip. Last ingestion was 4 months ago. In ED reported abdominal pain, but no N/V, coffee ground emesis or hematemesis. She is no longer on AC for prior hx of PE.   Remains HD stable in ED. Admitted for EGD under GA and further monitoring.           Past Medical History:   Reviewed and edited as  appropriate  Past Medical History:   Diagnosis Date    ADD (attention deficit disorder)     Anorexia nervosa with bulimia     history of; on Topamax    Anxiety     Borderline personality disorder (H)     Depression     H/O self-harm     History of pulmonary embolism 12/2019    Provoked. Completed 3 month course of Apixaban    Morbid obesity (H)     Neuropathy     PTSD (post-traumatic stress disorder)     Rectal foreign body - Recurrent issue, self placed     Swallowed foreign body - Recurrent issue, self placed             Past Surgical History:   Reviewed and edited as appropriate   Past Surgical History:   Procedure Laterality Date    COMBINED ESOPHAGOSCOPY, GASTROSCOPY, DUODENOSCOPY (EGD), REPLACE ESOPHAGEAL STENT N/A 10/9/2019    Procedure: Upper Endoscopy with Suture Placement;  Surgeon: Zurdo Ramirez MD;  Location: [facility]    ESOPHAGOSCOPY, GASTROSCOPY, DUODENOSCOPY (EGD), COMBINED N/A 3/9/2017    Procedure: COMBINED ESOPHAGOSCOPY, GASTROSCOPY, DUODENOSCOPY (EGD), REMOVE FOREIGN BODY;  Surgeon: Avis Guzmán MD;  Location: [facility]    ESOPHAGOSCOPY, GASTROSCOPY, DUODENOSCOPY (EGD), COMBINED N/A 4/20/2017    Procedure: COMBINED ESOPHAGOSCOPY, GASTROSCOPY, DUODENOSCOPY (EGD), REMOVE FOREIGN BODY;  EGD removal Foregin body;  Surgeon: Lokesh Paula MD;  Location: [facility]    ESOPHAGOSCOPY, GASTROSCOPY, DUODENOSCOPY (EGD), COMBINED N/A 6/12/2017    Procedure: COMBINED ESOPHAGOSCOPY, GASTROSCOPY, DUODENOSCOPY (EGD);  COMBINED ESOPHAGOSCOPY, GASTROSCOPY, DUODENOSCOPY (EGD) [[id number]]attempted removal of foreign body;  Surgeon: Pamela Perez MD;  [facility]: [facility]    ESOPHAGOSCOPY, GASTROSCOPY, DUODENOSCOPY (EGD), COMBINED N/A 6/9/2017    Procedure: COMBINED ESOPHAGOSCOPY, GASTROSCOPY, DUODENOSCOPY (EGD), REMOVE FOREIGN BODY;  Esophagoscopy, Gastroscopy, Duodenoscopy, Removal of Foreign Body;  Surgeon: Dejon Marsh MD;  Location: [facility]    ESOPHAGOSCOPY, GASTROSCOPY,  DUODENOSCOPY (EGD), COMBINED N/A 1/6/2018    Procedure: COMBINED ESOPHAGOSCOPY, GASTROSCOPY, DUODENOSCOPY (EGD), REMOVE FOREIGN BODY;  COMBINED ESOPHAGOSCOPY, GASTROSCOPY, DUODENOSCOPY (EGD) [by pascal net and snare with profol sedation;  Surgeon: Feliciano Emmanuel MD;  Location:  GI    ESOPHAGOSCOPY, GASTROSCOPY, DUODENOSCOPY (EGD), COMBINED N/A 3/19/2018    Procedure: COMBINED ESOPHAGOSCOPY, GASTROSCOPY, DUODENOSCOPY (EGD), REMOVE FOREIGN BODY;   Esophagodscopy, Gastroscopy, Duodenoscopy,Foreign Body Removal;  Surgeon: Brice Guzmán MD;  [facility]    ESOPHAGOSCOPY, GASTROSCOPY, DUODENOSCOPY (EGD), COMBINED N/A 4/16/2018    Procedure: COMBINED ESOPHAGOSCOPY, GASTROSCOPY, DUODENOSCOPY (EGD), REMOVE FOREIGN BODY;  Esophagogastroduodenoscopy  Foreign Body Removal X 2;  Surgeon: Royer Moise MD;  [facility]    ESOPHAGOSCOPY, GASTROSCOPY, DUODENOSCOPY (EGD), COMBINED N/A 6/1/2018    Procedure: COMBINED ESOPHAGOSCOPY, GASTROSCOPY, DUODENOSCOPY (EGD), REMOVE FOREIGN BODY;  COMBINED ESOPHAGOSCOPY, GASTROSCOPY, DUODENOSCOPY with removal of foreign body, propofol sedation by anesthesia;  Surgeon: Brice Martinez MD;  Location:  GI    ESOPHAGOSCOPY, GASTROSCOPY, DUODENOSCOPY (EGD), COMBINED N/A 7/25/2018    Procedure: COMBINED ESOPHAGOSCOPY, GASTROSCOPY, DUODENOSCOPY (EGD), REMOVE FOREIGN BODY;;  Surgeon: Candy Castelan MD;  Location:  GI    ESOPHAGOSCOPY, GASTROSCOPY, DUODENOSCOPY (EGD), COMBINED N/A 7/28/2018    Procedure: COMBINED ESOPHAGOSCOPY, GASTROSCOPY, DUODENOSCOPY (EGD), REMOVE FOREIGN BODY;  COMBINED ESOPHAGOSCOPY, GASTROSCOPY, DUODENOSCOPY (EGD), REMOVE FOREIGN BODY;  Surgeon: Brice Guzmán MD;  [facility]    ESOPHAGOSCOPY, GASTROSCOPY, DUODENOSCOPY (EGD), COMBINED N/A 7/31/2018    Procedure: COMBINED ESOPHAGOSCOPY, GASTROSCOPY, DUODENOSCOPY (EGD);  COMBINED ESOPHAGOSCOPY, GASTROSCOPY, DUODENOSCOPY (EGD) TO REMOVE FOREIGN BODY;  Surgeon: Lokesh Paula  MD Carlos;  [facility]    ESOPHAGOSCOPY, GASTROSCOPY, DUODENOSCOPY (EGD), COMBINED N/A 8/4/2018    Procedure: COMBINED ESOPHAGOSCOPY, GASTROSCOPY, DUODENOSCOPY (EGD), REMOVE FOREIGN BODY;   combined esophagoscopy, gastroscopy, duodenoscopy, REMOVE FOREIGN BODY ;  Surgeon: Lokesh Paula MD;  [facility]    ESOPHAGOSCOPY, GASTROSCOPY, DUODENOSCOPY (EGD), COMBINED N/A 10/6/2019    Procedure: ESOPHAGOGASTRODUODENOSCOPY (EGD) with fireign body removal x2, esophageal stent placement with floroscopy;  Surgeon: Timoteo Espana MD;  [facility]    ESOPHAGOSCOPY, GASTROSCOPY, DUODENOSCOPY (EGD), COMBINED N/A 12/2/2019    Procedure: Esophagogastroduodenoscopy with esophageal stent removal, esophogram;  Surgeon: Kailee Tena MD;  [facility]    ESOPHAGOSCOPY, GASTROSCOPY, DUODENOSCOPY (EGD), COMBINED N/A 12/17/2019    Procedure: ESOPHAGOGASTRODUODENOSCOPY, WITH FOREIGN BODY REMOVAL;  Surgeon: Pamela Perez MD;  [facility]    ESOPHAGOSCOPY, GASTROSCOPY, DUODENOSCOPY (EGD), COMBINED N/A 12/13/2019    Procedure: ESOPHAGOGASTRODUODENOSCOPY, WITH FOREIGN BODY REMOVAL;  Surgeon: Samia Stanton MD;  [facility]    ESOPHAGOSCOPY, GASTROSCOPY, DUODENOSCOPY (EGD), COMBINED N/A 12/28/2019    Procedure: ESOPHAGOGASTRODUODENOSCOPY (EGD) Removal of Foreign Body X 2;  Surgeon: Huy Kelley MD;  [facility]    ESOPHAGOSCOPY, GASTROSCOPY, DUODENOSCOPY (EGD), COMBINED N/A 1/5/2020    Procedure: ESOPHAGOGASTRODUOENOSCOPY WITH FOREIGN BODY REMOVAL;  Surgeon: Pamela Perez MD;  [facility]    ESOPHAGOSCOPY, GASTROSCOPY, DUODENOSCOPY (EGD), COMBINED N/A 1/3/2020    Procedure: ESOPHAGOGASTRODUODENOSCOPY (EGD) REMOVAL OF FOREIGN BODY.;  Surgeon: Pamela Perez MD;  [facility]    ESOPHAGOSCOPY, GASTROSCOPY, DUODENOSCOPY (EGD), COMBINED N/A 1/13/2020    Procedure: ESOPHAGOGASTRODUODENOSCOPY (EGD) for foreign body removal;  Surgeon: Chai  Lokesh Gonzalez MD;  [facility]    ESOPHAGOSCOPY, GASTROSCOPY, DUODENOSCOPY (EGD), COMBINED N/A 1/18/2020    Procedure: Diagnostic ESOPHAGOGASTRODUODENOSCOPY (EGD;  Surgeon: Lokesh Paula MD;  [facility]    ESOPHAGOSCOPY, GASTROSCOPY, DUODENOSCOPY (EGD), COMBINED N/A 3/29/2020    Procedure: UPPER ENDOSCOPY WITH FOREIGN BODY REMOVAL;  Surgeon: Doug Hansen MD;  [facility]    EXAM UNDER ANESTHESIA ANUS N/A 1/10/2017    Procedure: EXAM UNDER ANESTHESIA ANUS;  Surgeon: Annmarie Haynes MD;  [facility]    EXAM UNDER ANESTHESIA RECTUM N/A 7/19/2018    Procedure: EXAM UNDER ANESTHESIA RECTUM;  EXAM UNDER ANESTHESIA, REMOVAL OF RECTAL FOREIGN BODY;  Surgeon: Annmarie Haynes MD;  [facility]    HC REMOVE FECAL IMPACTION OR FB W ANESTHESIA N/A 12/18/2016    Procedure: REMOVE FECAL IMPACTION/FOREIGN BODY UNDER ANESTHESIA;  Surgeon: Soham Cano MD;  Location:  OR    KNEE SURGERY - removed a small tissue mass from knee Right     LAPAROSCOPIC ABLATION ENDOMETRIOSIS      LAPAROTOMY EXPLORATORY N/A 1/10/2017    Procedure: LAPAROTOMY EXPLORATORY;  Surgeon: Annmarie Haynes MD;  [facility]    LAPAROTOMY EXPLORATORY  09/11/2019    Manual manipulation of bowel to remove pill bottle in rectum    lymph nodes removed from neck; benign  age 7    MAMMOPLASTY REDUCTION Bilateral     RELEASE CARPAL TUNNEL Bilateral     SIGMOIDOSCOPY FLEXIBLE N/A 1/10/2017    Procedure: SIGMOIDOSCOPY FLEXIBLE;  Surgeon: Annmarie Haynes MD;  [facility]    SIGMOIDOSCOPY FLEXIBLE N/A 5/8/2018    Procedure: SIGMOIDOSCOPY FLEXIBLE;  flex sig with foreign body removal using snare and rattooth forcep;  Surgeon: Soham Cano MD;  Location:  GI    SIGMOIDOSCOPY FLEXIBLE N/A 2/20/2019    Procedure: Exam under anesthesia Colonoscopy with attempted  removal of rectal foreign body;  Surgeon: Estrada Chávez MD;  Location: [facility]            Previous Endoscopy:   No results  found for this or any previous visit.         Social History:   Reviewed and edited as appropriate  Social History     Socioeconomic History    Marital status: Single     Spouse name: Not on file    Number of children: Not on file    Years of education: Not on file    Highest education level: Not on file   Occupational History    Occupation: On disability   Social Needs    Financial resource strain: Not on file    Food insecurity     Worry: Not on file     Inability: Not on file    Transportation needs     Medical: Not on file     Non-medical: Not on file   Tobacco Use    Smoking status: Never Smoker    Smokeless tobacco: Never Used   Substance and Sexual Activity    Alcohol use: No     Alcohol/week: 0.0 standard drinks    Drug use: No    Sexual activity: Yes     Partners: Male     Birth control/protection: I.U.D.     Comment: IUD - Mirena - placed July, 2015   Lifestyle    Physical activity     Days per week: Not on file     Minutes per session: Not on file    Stress: Not on file   Relationships    Social connections     Talks on phone: Not on file     Gets together: Not on file     Attends Alevism service: Not on file     Active member of club or organization: Not on file     Attends meetings of clubs or organizations: Not on file     Relationship status: Not on file    Intimate partner violence     Fear of current or ex partner: Not on file     Emotionally abused: Not on file     Physically abused: Not on file     Forced sexual activity: Not on file   Other Topics Concern    Parent/sibling w/ CABG, MI or angioplasty before 65F 55M? Not Asked   Social History Narrative    Single.    Living in independent living portion of [facility].    On disability.    No regular exercise.             Family History:   Reviewed and edited as appropriate  Family History   Problem Relation Age of Onset    Diabetes Type 2  Maternal Grandmother     Diabetes Type 2  Paternal Grandmother     Breast Cancer Paternal  Grandmother     Cerebrovascular Disease Father 53    Myocardial Infarction No family hx of     Coronary Artery Disease Early Onset No family hx of     Ovarian Cancer No family hx of     Colon Cancer No family hx of            Allergies:   Reviewed and edited as appropriate     Allergies   Allergen Reactions    Amoxicillin-Pot Clavulanate Other (See Comments), Rash and Swelling     PN: facial swelling, left side. Also had cortisone injection the same day as she started the Augmentin.  PN: facial swelling, left side. Also had cortisone injection the same day as she started the Augmentin.  Noted in downtime recovery of chart.      Penicillins Anaphylaxis    Vancomycin Swelling, Itching and Rash     Other reaction(s): Redness  Flushed face, very itchy; HUT Comment: Flushed face, very itchy; HUT Reaction: Angioedema; HUT Reaction: Redness; HUT Severity: Med; HUT Noted: [id number]  Flushed face, very itchy  Flushed face, very itchy  Flushed face, very itchy      Hydrocodone Nausea and Vomiting and GI Disturbance     vomiting for days  vomiting for days  vomiting for days  vomiting for days, PN: vomiting for days  vomiting for days  vomiting for days  vomiting for days  vomiting for days  vomiting for days  vomiting for days, PN: vomiting for days  vomiting for days  vomiting for days  vomiting for days  vomiting for days  ; HUT Comment: vomiting for days; HUT Reaction: Gastrointestinal; HUT Reaction: Nausea And Vomiting; HUT Reaction Type: Intolerance; HUT Severity: Med; HUT Noted: [id number]  vomiting for days      Blood-Group Specific Substance Other (See Comments)     Patient has an anti-Cw and non-specific antibodies. Blood product orders may be delayed. Draw one red top and two purple top tubes for all type/screen/crossmatch orders.    Influenza Vaccines Other (See Comments)    Oseltamivir Hives     med stopped, PN: med stopped    Cephalosporins Rash    Lamotrigine Rash     Possibly associated with Lamictal.   HUT  Comment: Possibly associated with Lamictal. ; HUT Reaction: Rash; HUT Reaction Type: Allergy; HUT Severity: Low; HUT Noted: [id number]    Latex Rash            Medications:     Current Facility-Administered Medications   Medication    lactated ringers infusion    simethicone 133mg/2mL oral suspension     Facility-Administered Medications Ordered in Other Encounters   Medication    dexamethasone (DECADRON) injection    diphenhydrAMINE (BENADRYL) injection    ePHEDrine injection    fentaNYL (PF) (SUBLIMAZE) injection    lidocaine 2% injection (MDV)    midazolam (VERSED) injection    No abx ordered pre-op    ondansetron (ZOFRAN) injection    phenylephrine (STEFFEN-SYNEPHRINE) injection    propofol (DIPRIVAN) injection 10 mg/mL vial    rocuronium injection    succinylcholine (ANECTINE) injection    sugammadex (BRIDION) injection             Review of Systems:     A complete review of systems was performed and is negative except as noted in the HPI           Physical Exam:   BP (!) 144/88   Pulse 91   Temp 98.7  F (37.1  C) (Oral)   Resp 16   Wt 118.8 kg (261 lb 14.4 oz)   SpO2 99%   BMI 47.90 kg/m    Wt:   Wt Readings from Last 2 Encounters:   07/11/20 118.8 kg (261 lb 14.4 oz)   03/29/20 115.7 kg (255 lb)      Constitutional: cooperative, no acute distress  Eyes: Sclera anicteric/injected  Ears/nose/mouth/throat: Normal oropharynx without ulcers or exudate, mucus membranes moist, hearing intact  Neck: supple, thyroid normal size  CV: No edema  Respiratory: Unlabored breathing  Abd: Nondistended, +bs, no hepatosplenomegaly, nontender, no peritoneal signs  Skin: warm, perfused, no jaundice  Neuro: AAO x 3, No asterixis  Psych: Normal affect           Data:   Labs and imaging below were independently reviewed and interpreted    BMP  Recent Labs   Lab 07/11/20 2006      POTASSIUM 3.4   CHLORIDE 109   KELBY 8.9   CO2 24   BUN 9   CR 0.62   GLC 94     CBC  Recent Labs   Lab 07/11/20 2006   WBC 10.5   RBC 4.47   HGB  12.3   HCT 38.8   MCV 87   MCH 27.5   MCHC 31.7   RDW 14.2        INR  Recent Labs   Lab 07/11/20 2006   INR 1.05     LFTs  Recent Labs   Lab 07/11/20 2006   ALKPHOS 86   AST 20   ALT 31   BILITOTAL 0.3   PROTTOTAL 8.8   ALBUMIN 3.5      PANCNo lab results found in last 7 days.    Imaging

## 2020-07-12 NOTE — ED NOTES
[facility], [place]   ED Nurse to Floor Handoff     Nevin Alvarado is a 29 year old female who speaks English and lives with others,  in a group home  They arrived in the ED by ambulance from home    ED Chief Complaint: Swallowed Foreign Body    ED Dx;   Final diagnoses:   None         Needed?: No    Allergies:   Allergies   Allergen Reactions     Amoxicillin-Pot Clavulanate Other (See Comments), Rash and Swelling     PN: facial swelling, left side. Also had cortisone injection the same day as she started the Augmentin.  PN: facial swelling, left side. Also had cortisone injection the same day as she started the Augmentin.  Noted in downtime recovery of chart.       Penicillins Anaphylaxis     Vancomycin Swelling, Itching and Rash     Other reaction(s): Redness  Flushed face, very itchy; HUT Comment: Flushed face, very itchy; HUT Reaction: Angioedema; HUT Reaction: Redness; HUT Severity: Med; HUT Noted: [id number]  Flushed face, very itchy  Flushed face, very itchy  Flushed face, very itchy       Hydrocodone Nausea and Vomiting and GI Disturbance     vomiting for days  vomiting for days  vomiting for days  vomiting for days, PN: vomiting for days  vomiting for days  vomiting for days  vomiting for days  vomiting for days  vomiting for days  vomiting for days, PN: vomiting for days  vomiting for days  vomiting for days  vomiting for days  vomiting for days  ; HUT Comment: vomiting for days; HUT Reaction: Gastrointestinal; HUT Reaction: Nausea And Vomiting; HUT Reaction Type: Intolerance; HUT Severity: Med; HUT Noted: [id number]  vomiting for days       Blood-Group Specific Substance Other (See Comments)     Patient has an anti-Cw and non-specific antibodies. Blood product orders may be delayed. Draw one red top and two purple top tubes for all type/screen/crossmatch orders.     Influenza Vaccines Other (See Comments)     Oseltamivir Hives     med stopped, PN: med stopped      Cephalosporins Rash     Lamotrigine Rash     Possibly associated with Lamictal.   HUT Comment: Possibly associated with Lamictal. ; HUT Reaction: Rash; HUT Reaction Type: Allergy; HUT Severity: Low; HUT Noted: [id number]     Latex Rash   .  Past Medical Hx:   Past Medical History:   Diagnosis Date     ADD (attention deficit disorder)      Anorexia nervosa with bulimia     history of; on Topamax     Anxiety      Borderline personality disorder (H)      Depression      H/O self-harm      History of pulmonary embolism 12/2019    Provoked. Completed 3 month course of Apixaban     Morbid obesity (H)      Neuropathy      PTSD (post-traumatic stress disorder)      Rectal foreign body - Recurrent issue, self placed      Swallowed foreign body - Recurrent issue, self placed       Baseline Mental status: other anxiety causing her to swallow objects  Current Mental Status changes: needs sittter    Infection present or suspected this encounter: no  Sepsis suspected: No  Isolation type: No active isolations     Activity level - Baseline/Home:  Independent  Activity Level - Current:   Independent    Bariatric equipment needed?: No    In the ED these meds were given:   Medications   morphine (PF) injection 4 mg (4 mg Intravenous Given 7/11/20 2004)       Drips running?  No    Home pump  No    Current LDAs  Port A Cath Single 12/17/19 Right Chest wall (Active)   Number of days: 207       ETT 7 (Active)   Number of days: 104       Incision/Surgical Site 10/07/19 Lower;Midline Abdomen (Active)   Number of days: 278       Labs results:   Labs Ordered and Resulted from Time of ED Arrival Up to the Time of Departure from the ED   CBC WITH PLATELETS DIFFERENTIAL   COMPREHENSIVE METABOLIC PANEL   INR   PARTIAL THROMBOPLASTIN TIME   ABO/RH TYPE AND SCREEN       Imaging Studies:   Recent Results (from the past 24 hour(s))   XR Abdomen 1 View    Narrative    EXAM: XR ABDOMEN 1 VW  7/11/2020 7:30 PM     HISTORY:  swallowed metallic foreign body        COMPARISON:  3/29/2020    FINDINGS:   Portable supine views of the abdomen. There is redemonstration of a  linear radiopaque density projecting over the mid abdomen, measuring  12.7 cm long, not substantially changed in position compared to prior  examination. Nonobstructive bowel gas pattern. No visualized  pneumatosis or portal venous gas. Lung bases are clear. No acute  osseous abnormality. A catheter projects with tip overlying the high  right atrium.    Nonobstructive bowel gas pattern. No portal venous gas, pneumatosis or  free air in the abdomen.    The included osseous structures and soft tissues are within normal  limits.     The lung bases are clear.      Impression    IMPRESSION: Nonobstructive bowel gas pattern.     I have personally reviewed the examination and initial interpretation  and I agree with the findings.    CHINO PERAZA MD       Recent vital signs:   BP (!) 144/88   Pulse 91   Temp 98.7  F (37.1  C) (Oral)   Resp 16   Wt 118.8 kg (261 lb 14.4 oz)   SpO2 99%   BMI 47.90 kg/m      Eliazar Coma Scale Score: 15 (07/11/20 1809)       Cardiac Rhythm: Other  Pt needs tele? No  Skin/wound Issues: None    Code Status: Full Code    Pain control: fair    Nausea control: pt had none    Abnormal labs/tests/findings requiring intervention: see labs    Family present during ED course? No   Family Comments/Social Situation comments:     Tasks needing completion: None    Margie Mitchell, RN    [phone] [facility]

## 2020-07-12 NOTE — ED PROVIDER NOTES
[place] EMERGENCY DEPARTMENT ([facility])  July 11, 2020  History     Chief Complaint   Patient presents with     Swallowed Foreign Body     HPI  Nevin Alvarado is a 29 year old female with a borderline personality disorder, recurrent foreign body ingestion, anxiety, MDD, anorexia/bulimia, PTSD, PE (on Eliquis) who presents to the Emergency Department for evaluation after swallowing foreign body.  Patient states she had been doing well and not had any similar occurrences in several months.  Patient states that she felt anxious earlier today and straightened and swallowed a paperclip.  Patient states she is doing this as a coping mechanism and not trying to harm herself.  Patient denies any other ingestions or any other self harming activities recently.  She does note some scratchiness in her throat as well as left upper abdominal pain.  No other symptoms noted.      PAST MEDICAL HISTORY:   Past Medical History:   Diagnosis Date     ADD (attention deficit disorder)      Anorexia nervosa with bulimia     history of; on Topamax     Anxiety      Borderline personality disorder (H)      Depression      H/O self-harm      History of pulmonary embolism 12/2019    Provoked. Completed 3 month course of Apixaban     Morbid obesity (H)      Neuropathy      PTSD (post-traumatic stress disorder)      Rectal foreign body - Recurrent issue, self placed      Swallowed foreign body - Recurrent issue, self placed        PAST SURGICAL HISTORY:   Past Surgical History:   Procedure Laterality Date     COMBINED ESOPHAGOSCOPY, GASTROSCOPY, DUODENOSCOPY (EGD), REPLACE ESOPHAGEAL STENT N/A 10/9/2019    Procedure: Upper Endoscopy with Suture Placement;  Surgeon: Zurdo Ramirez MD;  Location:  OR     ESOPHAGOSCOPY, GASTROSCOPY, DUODENOSCOPY (EGD), COMBINED N/A 3/9/2017    Procedure: COMBINED ESOPHAGOSCOPY, GASTROSCOPY, DUODENOSCOPY (EGD), REMOVE FOREIGN BODY;  Surgeon: Avis Guzmán MD;  Location:   OR     ESOPHAGOSCOPY, GASTROSCOPY, DUODENOSCOPY (EGD), COMBINED N/A 4/20/2017    Procedure: COMBINED ESOPHAGOSCOPY, GASTROSCOPY, DUODENOSCOPY (EGD), REMOVE FOREIGN BODY;  EGD removal Foregin body;  Surgeon: Lokesh Paula MD;  Location: [facility]     ESOPHAGOSCOPY, GASTROSCOPY, DUODENOSCOPY (EGD), COMBINED N/A 6/12/2017    Procedure: COMBINED ESOPHAGOSCOPY, GASTROSCOPY, DUODENOSCOPY (EGD);  COMBINED ESOPHAGOSCOPY, GASTROSCOPY, DUODENOSCOPY (EGD) [[id number]]attempted removal of foreign body;  Surgeon: Pamela Perez MD;  Location: [facility]     ESOPHAGOSCOPY, GASTROSCOPY, DUODENOSCOPY (EGD), COMBINED N/A 6/9/2017    Procedure: COMBINED ESOPHAGOSCOPY, GASTROSCOPY, DUODENOSCOPY (EGD), REMOVE FOREIGN BODY;  Esophagoscopy, Gastroscopy, Duodenoscopy, Removal of Foreign Body;  Surgeon: Dejon Marsh MD;  Location: [facility]     ESOPHAGOSCOPY, GASTROSCOPY, DUODENOSCOPY (EGD), COMBINED N/A 1/6/2018    Procedure: COMBINED ESOPHAGOSCOPY, GASTROSCOPY, DUODENOSCOPY (EGD), REMOVE FOREIGN BODY;  COMBINED ESOPHAGOSCOPY, GASTROSCOPY, DUODENOSCOPY (EGD) [by pascal net and snare with profol sedation;  Surgeon: Feliciano Emmanuel MD;  Location:  GI     ESOPHAGOSCOPY, GASTROSCOPY, DUODENOSCOPY (EGD), COMBINED N/A 3/19/2018    Procedure: COMBINED ESOPHAGOSCOPY, GASTROSCOPY, DUODENOSCOPY (EGD), REMOVE FOREIGN BODY;   Esophagodscopy, Gastroscopy, Duodenoscopy,Foreign Body Removal;  Surgeon: Brice Guzmán MD;  Location: [facility]     ESOPHAGOSCOPY, GASTROSCOPY, DUODENOSCOPY (EGD), COMBINED N/A 4/16/2018    Procedure: COMBINED ESOPHAGOSCOPY, GASTROSCOPY, DUODENOSCOPY (EGD), REMOVE FOREIGN BODY;  Esophagogastroduodenoscopy  Foreign Body Removal X 2;  Surgeon: Royer Moise MD;  Location: [facility]     ESOPHAGOSCOPY, GASTROSCOPY, DUODENOSCOPY (EGD), COMBINED N/A 6/1/2018    Procedure: COMBINED ESOPHAGOSCOPY, GASTROSCOPY, DUODENOSCOPY (EGD), REMOVE FOREIGN BODY;  COMBINED ESOPHAGOSCOPY, GASTROSCOPY, DUODENOSCOPY  with removal of foreign body, propofol sedation by anesthesia;  Surgeon: Brice Martinez MD;  Location:  GI     ESOPHAGOSCOPY, GASTROSCOPY, DUODENOSCOPY (EGD), COMBINED N/A 7/25/2018    Procedure: COMBINED ESOPHAGOSCOPY, GASTROSCOPY, DUODENOSCOPY (EGD), REMOVE FOREIGN BODY;;  Surgeon: Candy Castelan MD;  Location:  GI     ESOPHAGOSCOPY, GASTROSCOPY, DUODENOSCOPY (EGD), COMBINED N/A 7/28/2018    Procedure: COMBINED ESOPHAGOSCOPY, GASTROSCOPY, DUODENOSCOPY (EGD), REMOVE FOREIGN BODY;  COMBINED ESOPHAGOSCOPY, GASTROSCOPY, DUODENOSCOPY (EGD), REMOVE FOREIGN BODY;  Surgeon: Brice Guzmán MD;  [facility]     ESOPHAGOSCOPY, GASTROSCOPY, DUODENOSCOPY (EGD), COMBINED N/A 7/31/2018    Procedure: COMBINED ESOPHAGOSCOPY, GASTROSCOPY, DUODENOSCOPY (EGD);  COMBINED ESOPHAGOSCOPY, GASTROSCOPY, DUODENOSCOPY (EGD) TO REMOVE FOREIGN BODY;  Surgeon: Lokesh Paula MD;  [facility]     ESOPHAGOSCOPY, GASTROSCOPY, DUODENOSCOPY (EGD), COMBINED N/A 8/4/2018    Procedure: COMBINED ESOPHAGOSCOPY, GASTROSCOPY, DUODENOSCOPY (EGD), REMOVE FOREIGN BODY;   combined esophagoscopy, gastroscopy, duodenoscopy, REMOVE FOREIGN BODY ;  Surgeon: Lokesh Paula MD;  [facility]: [facility]     ESOPHAGOSCOPY, GASTROSCOPY, DUODENOSCOPY (EGD), COMBINED N/A 10/6/2019    Procedure: ESOPHAGOGASTRODUODENOSCOPY (EGD) with fireign body removal x2, esophageal stent placement with floroscopy;  Surgeon: Timoteo Espana MD;  [facility]: [facility]     ESOPHAGOSCOPY, GASTROSCOPY, DUODENOSCOPY (EGD), COMBINED N/A 12/2/2019    Procedure: Esophagogastroduodenoscopy with esophageal stent removal, esophogram;  Surgeon: Kailee Tena MD;  [facility]: [facility]     ESOPHAGOSCOPY, GASTROSCOPY, DUODENOSCOPY (EGD), COMBINED N/A 12/17/2019    Procedure: ESOPHAGOGASTRODUODENOSCOPY, WITH FOREIGN BODY REMOVAL;  Surgeon: Pamela Perez MD;  [facility]     ESOPHAGOSCOPY, GASTROSCOPY, DUODENOSCOPY (EGD), COMBINED N/A 12/13/2019     Procedure: ESOPHAGOGASTRODUODENOSCOPY, WITH FOREIGN BODY REMOVAL;  Surgeon: Samia Stanton MD;  [facility]     ESOPHAGOSCOPY, GASTROSCOPY, DUODENOSCOPY (EGD), COMBINED N/A 12/28/2019    Procedure: ESOPHAGOGASTRODUODENOSCOPY (EGD) Removal of Foreign Body X 2;  Surgeon: Huy Kelley MD;  [facility]     ESOPHAGOSCOPY, GASTROSCOPY, DUODENOSCOPY (EGD), COMBINED N/A 1/5/2020    Procedure: ESOPHAGOGASTRODUOENOSCOPY WITH FOREIGN BODY REMOVAL;  Surgeon: Pamela Perez MD;  [facility]     ESOPHAGOSCOPY, GASTROSCOPY, DUODENOSCOPY (EGD), COMBINED N/A 1/3/2020    Procedure: ESOPHAGOGASTRODUODENOSCOPY (EGD) REMOVAL OF FOREIGN BODY.;  Surgeon: Pamela Perez MD;  [facility]     ESOPHAGOSCOPY, GASTROSCOPY, DUODENOSCOPY (EGD), COMBINED N/A 1/13/2020    Procedure: ESOPHAGOGASTRODUODENOSCOPY (EGD) for foreign body removal;  Surgeon: Lokesh Paula MD;  [facility]     ESOPHAGOSCOPY, GASTROSCOPY, DUODENOSCOPY (EGD), COMBINED N/A 1/18/2020    Procedure: Diagnostic ESOPHAGOGASTRODUODENOSCOPY (EGD;  Surgeon: Lokesh Paula MD;  [facility]     ESOPHAGOSCOPY, GASTROSCOPY, DUODENOSCOPY (EGD), COMBINED N/A 3/29/2020    Procedure: UPPER ENDOSCOPY WITH FOREIGN BODY REMOVAL;  Surgeon: Doug Hansen MD;  [facility]     EXAM UNDER ANESTHESIA ANUS N/A 1/10/2017    Procedure: EXAM UNDER ANESTHESIA ANUS;  Surgeon: Annmarie Haynes MD;  [facility]     EXAM UNDER ANESTHESIA RECTUM N/A 7/19/2018    Procedure: EXAM UNDER ANESTHESIA RECTUM;  EXAM UNDER ANESTHESIA, REMOVAL OF RECTAL FOREIGN BODY;  Surgeon: Annmarie Haynes MD;  [facility]     HC REMOVE FECAL IMPACTION OR FB W ANESTHESIA N/A 12/18/2016    Procedure: REMOVE FECAL IMPACTION/FOREIGN BODY UNDER ANESTHESIA;  Surgeon: Soham Cano MD;  Location: [facility] OR     KNEE SURGERY - removed a small tissue mass from knee Right      LAPAROSCOPIC ABLATION ENDOMETRIOSIS       LAPAROTOMY  EXPLORATORY N/A 1/10/2017    Procedure: LAPAROTOMY EXPLORATORY;  Surgeon: Annmarie Haynes MD;  [facility]: [facility]     LAPAROTOMY EXPLORATORY  09/11/2019    Manual manipulation of bowel to remove pill bottle in rectum     lymph nodes removed from neck; benign  age 7     MAMMOPLASTY REDUCTION Bilateral      RELEASE CARPAL TUNNEL Bilateral      SIGMOIDOSCOPY FLEXIBLE N/A 1/10/2017    Procedure: SIGMOIDOSCOPY FLEXIBLE;  Surgeon: Annmarie Haynes MD;  [facility]: [facility]     SIGMOIDOSCOPY FLEXIBLE N/A 5/8/2018    Procedure: SIGMOIDOSCOPY FLEXIBLE;  flex sig with foreign body removal using snare and rattooth forcep;  Surgeon: Soham Cano MD;  Location: [facility] GI     SIGMOIDOSCOPY FLEXIBLE N/A 2/20/2019    Procedure: Exam under anesthesia Colonoscopy with attempted  removal of rectal foreign body;  Surgeon: Estrada Chávez MD;  Location: [facility]       Past medical history, past surgical history, medications, and allergies were reviewed with the patient. Additional pertinent items: None    FAMILY HISTORY:   Family History   Problem Relation Age of Onset     Diabetes Type 2  Maternal Grandmother      Diabetes Type 2  Paternal Grandmother      Breast Cancer Paternal Grandmother      Cerebrovascular Disease Father 53     Myocardial Infarction No family hx of      Coronary Artery Disease Early Onset No family hx of      Ovarian Cancer No family hx of      Colon Cancer No family hx of        SOCIAL HISTORY:   Social History     Tobacco Use     Smoking status: Never Smoker     Smokeless tobacco: Never Used   Substance Use Topics     Alcohol use: No     Alcohol/week: 0.0 standard drinks     Social history was reviewed with the patient. Additional pertinent items: None      Patient's Medications   New Prescriptions    No medications on file   Previous Medications    ACETAMINOPHEN (TYLENOL) 32 MG/ML LIQUID    Take 31.25 mLs (1,000 mg) by mouth every 6 hours as needed for fever or pain    BUSPIRONE (BUSPAR) 10 MG TABLET     Take 1 tablet (10 mg) by mouth twice daily    CHOLECALCIFEROL (VITAMIN D) 50 MCG (2000 UT) CAPS    Take 2,000 Units by mouth daily     CLONIDINE (CATAPRES) 0.1 MG TABLET    Take 0.1 mg by mouth 2 times daily     DESVENLAFAXINE (PRISTIQ) 100 MG 24 HR TABLET    Take 1 tablet (100 mg) by mouth every morning    DOCOSANOL (ABREVA) 10 % CREA CREAM    Apply to lip 5 times a day as soon as symptoms begin, do not use for more than 10 days. Used PRN.    HYDROXYCHLOROQUINE (PLAQUENIL) 200 MG TABLET    Take 200 mg by mouth 2 times daily    HYDROXYZINE (ATARAX) 50 MG TABLET    Take 1 tablet (50 mg) by mouth 3 times daily as needed for anxiety    LEVONORGESTREL (MIRENA) 20 MCG/24HR IUD    1 each by Intrauterine route once    LURASIDONE (LATUDA) 60 MG TABS TABLET    Take 1 tablet (60 mg) by mouth at bedtime    METFORMIN (GLUCOPHAGE-XR) 500 MG 24 HR TABLET    Take 1 tablet (500 mg) by mouth daily    ONDANSETRON (ZOFRAN-ODT) 4 MG ODT TAB    Take 4 mg by mouth every 6 hours as needed for nausea or vomiting     PREGABALIN (LYRICA) 50 MG CAPSULE    Take 50 mg by mouth 3 times daily    TOPIRAMATE (TOPAMAX) 100 MG TABLET    Take 1 tablet (100 mg) by mouth daily at bedtime   Modified Medications    No medications on file   Discontinued Medications    GABAPENTIN (NEURONTIN) 600 MG TABLET    Take 600 mg by mouth 3 times daily           Allergies   Allergen Reactions     Amoxicillin-Pot Clavulanate Other (See Comments), Rash and Swelling     PN: facial swelling, left side. Also had cortisone injection the same day as she started the Augmentin.  PN: facial swelling, left side. Also had cortisone injection the same day as she started the Augmentin.  Noted in downtime recovery of chart.       Penicillins Anaphylaxis     Vancomycin Swelling, Itching and Rash     Other reaction(s): Redness  Flushed face, very itchy; HUT Comment: Flushed face, very itchy; HUT Reaction: Angioedema; HUT Reaction: Redness; HUT Severity: Med; HUT Noted:  [id number]  Flushed face, very itchy  Flushed face, very itchy  Flushed face, very itchy       Hydrocodone Nausea and Vomiting and GI Disturbance     vomiting for days  vomiting for days  vomiting for days  vomiting for days, PN: vomiting for days  vomiting for days  vomiting for days  vomiting for days  vomiting for days  vomiting for days  vomiting for days, PN: vomiting for days  vomiting for days  vomiting for days  vomiting for days  vomiting for days  ; HUT Comment: vomiting for days; HUT Reaction: Gastrointestinal; HUT Reaction: Nausea And Vomiting; HUT Reaction Type: Intolerance; HUT Severity: Med; HUT Noted: [id number]  vomiting for days       Blood-Group Specific Substance Other (See Comments)     Patient has an anti-Cw and non-specific antibodies. Blood product orders may be delayed. Draw one red top and two purple top tubes for all type/screen/crossmatch orders.     Influenza Vaccines Other (See Comments)     Oseltamivir Hives     med stopped, PN: med stopped     Cephalosporins Rash     Lamotrigine Rash     Possibly associated with Lamictal.   HUT Comment: Possibly associated with Lamictal. ; HUT Reaction: Rash; HUT Reaction Type: Allergy; HUT Severity: Low; HUT Noted: [id number]     Latex Rash        Review of Systems  A complete review of systems was performed with pertinent positives and negatives noted in the HPI, and all other systems negative.    Physical Exam   BP: (!) 144/88  Pulse: 91  Temp: 98.7  F (37.1  C)  Resp: 16  Weight: 118.8 kg (261 lb 14.4 oz)(bed scale)  SpO2: 99 %      Physical Exam  Constitutional:       General: She is not in acute distress.     Appearance: She is well-developed. She is obese. She is not diaphoretic.   HENT:      Head: Normocephalic and atraumatic.      Mouth/Throat:      Pharynx: No oropharyngeal exudate.   Eyes:      General: No scleral icterus.        Right eye: No discharge.         Left eye: No discharge.      Pupils: Pupils are equal, round, and reactive to light.    Neck:      Musculoskeletal: Normal range of motion and neck supple.   Cardiovascular:      Rate and Rhythm: Normal rate and regular rhythm.      Heart sounds: Normal heart sounds. No murmur. No friction rub. No gallop.    Pulmonary:      Effort: Pulmonary effort is normal. No respiratory distress.      Breath sounds: Normal breath sounds. No wheezing.   Chest:      Chest wall: No tenderness.   Abdominal:      General: Bowel sounds are normal. There is no distension.      Palpations: Abdomen is soft.      Tenderness: There is no abdominal tenderness.   Musculoskeletal: Normal range of motion.         General: No tenderness or deformity.   Skin:     General: Skin is warm and dry.      Coloration: Skin is not pale.      Findings: No erythema or rash.   Neurological:      Mental Status: She is alert and oriented to person, place, and time.      Cranial Nerves: No cranial nerve deficit.         ED Course        Procedures                           No results found for this or any previous visit (from the past 24 hour(s)).  Medications - No data to display          Assessments & Plan (with Medical Decision Making)   Is a 29-year-old female who presents with foreign body ingestion.  Patient states she straightened and swallowed a paperclip.  She has a history of doing this numerous times in the past.  Patient states that this was not attempted self-harm but a coping mechanism.  She notes throat and abdominal pain.  Abdominal x-ray demonstrates the swallowed foreign body.  There is no free air under the diaphragm or any other abnormality.  I discussed the case with GI who plans to take patient to the OR for retrieval. DEC assessment was obtained.  Patient denies any intent of self-harm with these behaviors however they do cause risk of serious complication or death.  As patient has a history of similar occurrences in the past and these do appear to occur in clusters I believe patient is at risk of repeating these  behaviors in the near future.  For this reason patient was placed on a 72-hour hold.  Patient will be admitted to internal medicine for clearance after removal of the foreign body prior to admission for mental health.     I have reviewed the nursing notes.    I have reviewed the findings, diagnosis, plan and need for follow up with the patient.    New Prescriptions    No medications on file       Final diagnoses:   None       7/11/2020   [facility], [place], EMERGENCY DEPARTMENT     Dejon Rodriguez, DO  07/12/20 0219       Dejon Rodriguez, DO  07/12/20 0220

## 2020-07-12 NOTE — CONSULTS
"  [facility], [place]   Initial Psychiatric Consult   Consult date: July 12, 2020         Reason for Consult, requesting source:    History of depression, BPD. Foreign body ingestion. I have seen her 2/25/18 and 4/17/18. Dr Solano saw her 7/19/18.   Requesting source: Jermaine Pena  Primary Outpatient Psychiatrist: Dr. Brionna De La Rosa, at [facility]   Primary Physician: Latonya Knight    Therapist: [facility] : Solange Perez, [facility] though [place].  ([phone])  Family Members: Guardian: Marianna Trujillo ([phone])  Mother: Clarita - Patient requests do not contact  Home care nurse: [facility] in [place] ([phone])      HPI:   This 29 year old  female has a past psychiatric history of borderline personality disorder, PTSD, CANDICE and ADHD who presented to the ED after swallowing a paperclip. She has had a problem with ingesting foreign bodies for a number of years, but has been doing quite well. Now doing DBT and meeting with psychiatrist and therapist using telemedicine.   From psych admission 3/29/20  The patient has had several  psychiatric hospitalizations for swallowing foreign bodies or insertion with and without intent to harm self, most recently  3/10/18 for ingestion of mickie pins. Per chart review she has required at least 11 EGDs, 2 EUAs, and 3 exploratory laparatomies since 2016. She has also had 4 overdoses in the past two years, most recently 4/17/17 with oxycodone. Since past hospitalization, the patient has had ER visits every 1-5 days including 4 in the past two weeks at [facility], [facility], and [facility].    She says that she had become increasingly anxious and usual coping mechanisms didn't work, so she thought that ingesting something might help. Although it didn't help much, she now feels stable, no SIB urges. Mood has been \"pretty good\" lately, sleep and appetite " "ok. Not hopeless or suicidal. Some anxiety, but manageable. PTSD symptoms are not too bad.          Past Psychiatric History:   On January 17th 2020 patient went to the ED after ingesting a pen spring after being placed under guardianship on 1/16/2020.  Additionally admitted to ED on 1/13/2020, 1/5/2020, 12/28/19 after swallowing pen springs and numerous other ED visits for foreign body ingestion.    Most recently inpatient hospitalization on 3/20/18 through 3/21/2018 after swallowing hairpins in an attempt to self-harm in the context of increasing depressive symptoms and worsening compulsions to self injure.  During this admission patient recently restarted on her home medications including desvenlafaxine, lurasidone and melatonin.  Patient agreed to increasing Latuda to 40 mg to address mood symptoms and requested discharge.  Previously hospitalized on 2/13/2018 for ingestion of Alberto pins. Patient was discharged with plan for outpatient follow-up.  Patient has had numerous psychiatric hospitalizations for swallowing foreign bodies were his rotation with and without intent to harm herself.    Court Committments: Per patient report she is under \"emergency guardianship\" until 4/15/2020.  Per chart history of commitment on 12/2015-12/2017   Suicide attempts: 4 overdoses in the past 2 years most recently on 4/17/2017 with oxycodone (though she denies intent to self harm) and in 2015.  Numerous foreign body ingestions and an's rotations requiring at least 11 EGDs, 2x EUAs, and 3 ex lap's since 2016.        Substance Use and History:   No drug or alcohol use. No CD treatments.         Past Medical History:   PAST MEDICAL HISTORY:   Past Medical History:   Diagnosis Date     ADD (attention deficit disorder)      Anorexia nervosa with bulimia     history of; on Topamax     Anxiety      Borderline personality disorder (H)      Depression      H/O self-harm      History of pulmonary embolism 12/2019    Provoked. Completed 3 " month course of Apixaban     Morbid obesity (H)      Neuropathy      PTSD (post-traumatic stress disorder)      Rectal foreign body - Recurrent issue, self placed      Swallowed foreign body - Recurrent issue, self placed        PAST SURGICAL HISTORY:   Past Surgical History:   Procedure Laterality Date     COMBINED ESOPHAGOSCOPY, GASTROSCOPY, DUODENOSCOPY (EGD), REPLACE ESOPHAGEAL STENT N/A 10/9/2019    Procedure: Upper Endoscopy with Suture Placement;  Surgeon: Zurdo Ramirez MD;  [facility]     ESOPHAGOSCOPY, GASTROSCOPY, DUODENOSCOPY (EGD), COMBINED N/A 3/9/2017    Procedure: COMBINED ESOPHAGOSCOPY, GASTROSCOPY, DUODENOSCOPY (EGD), REMOVE FOREIGN BODY;  Surgeon: Avis Guzmán MD;  [facility]     ESOPHAGOSCOPY, GASTROSCOPY, DUODENOSCOPY (EGD), COMBINED N/A 4/20/2017    Procedure: COMBINED ESOPHAGOSCOPY, GASTROSCOPY, DUODENOSCOPY (EGD), REMOVE FOREIGN BODY;  EGD removal Foregin body;  Surgeon: Lokesh Paula MD;  [facility]     ESOPHAGOSCOPY, GASTROSCOPY, DUODENOSCOPY (EGD), COMBINED N/A 6/12/2017    Procedure: COMBINED ESOPHAGOSCOPY, GASTROSCOPY, DUODENOSCOPY (EGD);  COMBINED ESOPHAGOSCOPY, GASTROSCOPY, DUODENOSCOPY (EGD) [[id number]]attempted removal of foreign body;  Surgeon: Pamela Perez MD;  [facility]     ESOPHAGOSCOPY, GASTROSCOPY, DUODENOSCOPY (EGD), COMBINED N/A 6/9/2017    Procedure: COMBINED ESOPHAGOSCOPY, GASTROSCOPY, DUODENOSCOPY (EGD), REMOVE FOREIGN BODY;  Esophagoscopy, Gastroscopy, Duodenoscopy, Removal of Foreign Body;  Surgeon: Dejon Marsh MD;  [facility]     ESOPHAGOSCOPY, GASTROSCOPY, DUODENOSCOPY (EGD), COMBINED N/A 1/6/2018    Procedure: COMBINED ESOPHAGOSCOPY, GASTROSCOPY, DUODENOSCOPY (EGD), REMOVE FOREIGN BODY;  COMBINED ESOPHAGOSCOPY, GASTROSCOPY, DUODENOSCOPY (EGD) [by pascal net and snare with profol sedation;  Surgeon: Feliciano Emmanuel MD;  Location:  [facility]     ESOPHAGOSCOPY, GASTROSCOPY, DUODENOSCOPY  (EGD), COMBINED N/A 3/19/2018    Procedure: COMBINED ESOPHAGOSCOPY, GASTROSCOPY, DUODENOSCOPY (EGD), REMOVE FOREIGN BODY;   Esophagodscopy, Gastroscopy, Duodenoscopy,Foreign Body Removal;  Surgeon: Brice Guzmán MD;  [facility]     ESOPHAGOSCOPY, GASTROSCOPY, DUODENOSCOPY (EGD), COMBINED N/A 4/16/2018    Procedure: COMBINED ESOPHAGOSCOPY, GASTROSCOPY, DUODENOSCOPY (EGD), REMOVE FOREIGN BODY;  Esophagogastroduodenoscopy  Foreign Body Removal X 2;  Surgeon: Royer Moise MD;  [facility]     ESOPHAGOSCOPY, GASTROSCOPY, DUODENOSCOPY (EGD), COMBINED N/A 6/1/2018    Procedure: COMBINED ESOPHAGOSCOPY, GASTROSCOPY, DUODENOSCOPY (EGD), REMOVE FOREIGN BODY;  COMBINED ESOPHAGOSCOPY, GASTROSCOPY, DUODENOSCOPY with removal of foreign body, propofol sedation by anesthesia;  Surgeon: Brice Martinez MD;  Location:  GI     ESOPHAGOSCOPY, GASTROSCOPY, DUODENOSCOPY (EGD), COMBINED N/A 7/25/2018    Procedure: COMBINED ESOPHAGOSCOPY, GASTROSCOPY, DUODENOSCOPY (EGD), REMOVE FOREIGN BODY;;  Surgeon: Candy Castelan MD;  Location:  GI     ESOPHAGOSCOPY, GASTROSCOPY, DUODENOSCOPY (EGD), COMBINED N/A 7/28/2018    Procedure: COMBINED ESOPHAGOSCOPY, GASTROSCOPY, DUODENOSCOPY (EGD), REMOVE FOREIGN BODY;  COMBINED ESOPHAGOSCOPY, GASTROSCOPY, DUODENOSCOPY (EGD), REMOVE FOREIGN BODY;  Surgeon: Brice Guzmán MD;  [facility]     ESOPHAGOSCOPY, GASTROSCOPY, DUODENOSCOPY (EGD), COMBINED N/A 7/31/2018    Procedure: COMBINED ESOPHAGOSCOPY, GASTROSCOPY, DUODENOSCOPY (EGD);  COMBINED ESOPHAGOSCOPY, GASTROSCOPY, DUODENOSCOPY (EGD) TO REMOVE FOREIGN BODY;  Surgeon: Lokesh Paula MD;  [facility]     ESOPHAGOSCOPY, GASTROSCOPY, DUODENOSCOPY (EGD), COMBINED N/A 8/4/2018    Procedure: COMBINED ESOPHAGOSCOPY, GASTROSCOPY, DUODENOSCOPY (EGD), REMOVE FOREIGN BODY;   combined esophagoscopy, gastroscopy, duodenoscopy, REMOVE FOREIGN BODY ;  Surgeon: Lokesh Paula MD;  [facility]      ESOPHAGOSCOPY, GASTROSCOPY, DUODENOSCOPY (EGD), COMBINED N/A 10/6/2019    Procedure: ESOPHAGOGASTRODUODENOSCOPY (EGD) with fireign body removal x2, esophageal stent placement with floroscopy;  Surgeon: Timoteo Espana MD;  [facility]     ESOPHAGOSCOPY, GASTROSCOPY, DUODENOSCOPY (EGD), COMBINED N/A 12/2/2019    Procedure: Esophagogastroduodenoscopy with esophageal stent removal, esophogram;  Surgeon: Kailee Tena MD;  [facility]     ESOPHAGOSCOPY, GASTROSCOPY, DUODENOSCOPY (EGD), COMBINED N/A 12/17/2019    Procedure: ESOPHAGOGASTRODUODENOSCOPY, WITH FOREIGN BODY REMOVAL;  Surgeon: Pamela Perez MD;  [facility]     ESOPHAGOSCOPY, GASTROSCOPY, DUODENOSCOPY (EGD), COMBINED N/A 12/13/2019    Procedure: ESOPHAGOGASTRODUODENOSCOPY, WITH FOREIGN BODY REMOVAL;  Surgeon: Samia Stanton MD;  [facility]     ESOPHAGOSCOPY, GASTROSCOPY, DUODENOSCOPY (EGD), COMBINED N/A 12/28/2019    Procedure: ESOPHAGOGASTRODUODENOSCOPY (EGD) Removal of Foreign Body X 2;  Surgeon: Huy Kelley MD;  [facility]     ESOPHAGOSCOPY, GASTROSCOPY, DUODENOSCOPY (EGD), COMBINED N/A 1/5/2020    Procedure: ESOPHAGOGASTRODUOENOSCOPY WITH FOREIGN BODY REMOVAL;  Surgeon: Pamela Perez MD;  [facility]     ESOPHAGOSCOPY, GASTROSCOPY, DUODENOSCOPY (EGD), COMBINED N/A 1/3/2020    Procedure: ESOPHAGOGASTRODUODENOSCOPY (EGD) REMOVAL OF FOREIGN BODY.;  Surgeon: Pamela Perez MD;  [facility]     ESOPHAGOSCOPY, GASTROSCOPY, DUODENOSCOPY (EGD), COMBINED N/A 1/13/2020    Procedure: ESOPHAGOGASTRODUODENOSCOPY (EGD) for foreign body removal;  Surgeon: Lokesh Paula MD;  [facility]     ESOPHAGOSCOPY, GASTROSCOPY, DUODENOSCOPY (EGD), COMBINED N/A 1/18/2020    Procedure: Diagnostic ESOPHAGOGASTRODUODENOSCOPY (EGD;  Surgeon: Lokesh Paula MD;  [facility]     ESOPHAGOSCOPY, GASTROSCOPY, DUODENOSCOPY (EGD), COMBINED N/A 3/29/2020    Procedure: UPPER  ENDOSCOPY WITH FOREIGN BODY REMOVAL;  Surgeon: Doug Hansen MD;  [facility]     EXAM UNDER ANESTHESIA ANUS N/A 1/10/2017    Procedure: EXAM UNDER ANESTHESIA ANUS;  Surgeon: Annmarie Haynes MD;  [facility]     EXAM UNDER ANESTHESIA RECTUM N/A 7/19/2018    Procedure: EXAM UNDER ANESTHESIA RECTUM;  EXAM UNDER ANESTHESIA, REMOVAL OF RECTAL FOREIGN BODY;  Surgeon: Annmarie Haynes MD;  [facility]     HC REMOVE FECAL IMPACTION OR FB W ANESTHESIA N/A 12/18/2016    Procedure: REMOVE FECAL IMPACTION/FOREIGN BODY UNDER ANESTHESIA;  Surgeon: Soham Cano MD;  Location: [facility]     KNEE SURGERY - removed a small tissue mass from knee Right      LAPAROSCOPIC ABLATION ENDOMETRIOSIS       LAPAROTOMY EXPLORATORY N/A 1/10/2017    Procedure: LAPAROTOMY EXPLORATORY;  Surgeon: Annmarie Haynes MD;  [facility]     LAPAROTOMY EXPLORATORY  09/11/2019    Manual manipulation of bowel to remove pill bottle in rectum     lymph nodes removed from neck; benign  age 7     MAMMOPLASTY REDUCTION Bilateral      RELEASE CARPAL TUNNEL Bilateral      SIGMOIDOSCOPY FLEXIBLE N/A 1/10/2017    Procedure: SIGMOIDOSCOPY FLEXIBLE;  Surgeon: Annmarie Haynes MD;  [facility]     SIGMOIDOSCOPY FLEXIBLE N/A 5/8/2018    Procedure: SIGMOIDOSCOPY FLEXIBLE;  flex sig with foreign body removal using snare and rattooth forcep;  Surgeon: Soham Cano MD;  Location:  [facility]     SIGMOIDOSCOPY FLEXIBLE N/A 2/20/2019    Procedure: Exam under anesthesia Colonoscopy with attempted  removal of rectal foreign body;  Surgeon: Estrada Chávez MD;  [facility]             Family History:   FAMILY HISTORY:   Family History   Problem Relation Age of Onset     Diabetes Type 2  Maternal Grandmother      Diabetes Type 2  Paternal Grandmother      Breast Cancer Paternal Grandmother      Cerebrovascular Disease Father 53     Myocardial Infarction No family hx of      Coronary Artery Disease Early Onset No  "family hx of      Ovarian Cancer No family hx of      Colon Cancer No family hx of            Social History:   SOCIAL HISTORY:   Social History     Tobacco Use     Smoking status: Never Smoker     Smokeless tobacco: Never Used   Substance Use Topics     Alcohol use: No     Alcohol/week: 0.0 standard drinks     [place] up \"[place] over [place]\". Has 4 siblings, one sister lives very close to her. Quit HS in senior year. 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         Physical ROS:   The patient endorsed slight GI upset. The remainder of 10-point review of systems was negative except as noted in HPI.         PTA Medications:     Medications Prior to Admission   Medication Sig Dispense Refill Last Dose     busPIRone (BUSPAR) 10 MG tablet Take 1 tablet (10 mg) by mouth twice daily   7/11/2020 at 0800     Cholecalciferol (VITAMIN D) 50 MCG (2000 UT) CAPS Take 2,000 Units by mouth daily    7/11/2020 at 0800     cloNIDine (CATAPRES) 0.1 MG tablet Take 0.1 mg by mouth 2 times daily    7/11/2020 at 0800     desvenlafaxine (PRISTIQ) 100 MG 24 hr tablet Take 1 tablet (100 mg) by mouth every morning   7/11/2020 at 0800     hydroxychloroquine (PLAQUENIL) 200 MG tablet Take 200 mg by mouth 2 times daily   7/11/2020 at 0800     lurasidone (LATUDA) 60 MG TABS tablet Take 1 tablet (60 mg) by mouth at bedtime   7/10/2020 at 2000     metFORMIN (GLUCOPHAGE-XR) 500 MG 24 hr tablet Take 1,000 mg by mouth Take 1 tablet (500 mg) by mouth daily    7/11/2020 at 1700     norethindrone (MICRONOR) 0.35 MG tablet Take 0.35 mg by mouth daily   7/11/2020 at 0800     pregabalin (LYRICA) 50 MG capsule Take 50 mg by mouth 3 times daily   7/11/2020 at 1400     Prenatal Vit-Fe Fumarate-FA (PNV PRENATAL PLUS MULTIVITAMIN) 27-1 MG TABS per tablet Take 1 tablet by mouth daily   7/11/2020 at 0800     topiramate (TOPAMAX) 100 MG tablet Take 50 mg by mouth Take 1 tablet (100 mg) by mouth daily at bedtime    7/10/2020 at 2000     " acetaminophen (TYLENOL) 32 mg/mL liquid Take 31.25 mLs (1,000 mg) by mouth every 6 hours as needed for fever or pain 355 mL 0 More than a month at Unknown time     albuterol (PROAIR HFA/PROVENTIL HFA/VENTOLIN HFA) 108 (90 Base) MCG/ACT inhaler Inhale 2 puffs into the lungs as needed for shortness of breath / dyspnea or wheezing   More than a month at Unknown time     docosanol (ABREVA) 10 % CREA cream Apply to lip 5 times a day as soon as symptoms begin, do not use for more than 10 days. Used PRN.   More than a month at Unknown time     hydrOXYzine (ATARAX) 50 MG tablet Take 1 tablet (50 mg) by mouth 3 times daily as needed for anxiety 30 tablet 0 More than a month at Unknown time     ondansetron (ZOFRAN-ODT) 4 MG ODT tab Take 4 mg by mouth every 6 hours as needed for nausea or vomiting    More than a month at Unknown time            Allergies:     Allergies   Allergen Reactions     Amoxicillin-Pot Clavulanate Other (See Comments), Rash and Swelling     PN: facial swelling, left side. Also had cortisone injection the same day as she started the Augmentin.  PN: facial swelling, left side. Also had cortisone injection the same day as she started the Augmentin.  Noted in downtime recovery of chart.       Penicillins Anaphylaxis     Vancomycin Swelling, Itching and Rash     Other reaction(s): Redness  Flushed face, very itchy; HUT Comment: Flushed face, very itchy; HUT Reaction: Angioedema; HUT Reaction: Redness; HUT Severity: Med; HUT Noted: [id number]  Flushed face, very itchy  Flushed face, very itchy  Flushed face, very itchy       Hydrocodone Nausea and Vomiting and GI Disturbance     vomiting for days  vomiting for days  vomiting for days  vomiting for days, PN: vomiting for days  vomiting for days  vomiting for days  vomiting for days  vomiting for days  vomiting for days  vomiting for days, PN: vomiting for days  vomiting for days  vomiting for days  vomiting for days  vomiting for days  ; HUT Comment: vomiting  for days; HUT Reaction: Gastrointestinal; HUT Reaction: Nausea And Vomiting; HUT Reaction Type: Intolerance; HUT Severity: Med; HUT Noted: [id number]  vomiting for days       Blood-Group Specific Substance Other (See Comments)     Patient has an anti-Cw and non-specific antibodies. Blood product orders may be delayed. Draw one red top and two purple top tubes for all type/screen/crossmatch orders.     Influenza Vaccines Other (See Comments)     Oseltamivir Hives     med stopped, PN: med stopped     Cephalosporins Rash     Lamotrigine Rash     Possibly associated with Lamictal.   HUT Comment: Possibly associated with Lamictal. ; HUT Reaction: Rash; HUT Reaction Type: Allergy; HUT Severity: Low; HUT Noted: [id number]     Latex Rash          Labs:     Recent Results (from the past 48 hour(s))   CBC with platelets differential    Collection Time: 07/11/20  8:06 PM   Result Value Ref Range    WBC 10.5 4.0 - 11.0 10e9/L    RBC Count 4.47 3.8 - 5.2 10e12/L    Hemoglobin 12.3 11.7 - 15.7 g/dL    Hematocrit 38.8 35.0 - 47.0 %    MCV 87 78 - 100 fl    MCH 27.5 26.5 - 33.0 pg    MCHC 31.7 31.5 - 36.5 g/dL    RDW 14.2 10.0 - 15.0 %    Platelet Count 255 150 - 450 10e9/L    Diff Method Automated Method     % Neutrophils 77.8 %    % Lymphocytes 15.2 %    % Monocytes 5.3 %    % Eosinophils 1.1 %    % Basophils 0.3 %    % Immature Granulocytes 0.3 %    Nucleated RBCs 0 0 /100    Absolute Neutrophil 8.2 1.6 - 8.3 10e9/L    Absolute Lymphocytes 1.6 0.8 - 5.3 10e9/L    Absolute Monocytes 0.6 0.0 - 1.3 10e9/L    Absolute Eosinophils 0.1 0.0 - 0.7 10e9/L    Absolute Basophils 0.0 0.0 - 0.2 10e9/L    Abs Immature Granulocytes 0.0 0 - 0.4 10e9/L    Absolute Nucleated RBC 0.0    Comprehensive metabolic panel    Collection Time: 07/11/20  8:06 PM   Result Value Ref Range    Sodium 138 133 - 144 mmol/L    Potassium 3.4 3.4 - 5.3 mmol/L    Chloride 109 94 - 109 mmol/L    Carbon Dioxide 24 20 - 32 mmol/L    Anion Gap 5 3 - 14 mmol/L    Glucose 94  70 - 99 mg/dL    Urea Nitrogen 9 7 - 30 mg/dL    Creatinine 0.62 0.52 - 1.04 mg/dL    GFR Estimate >90 >60 mL/min/[1.73_m2]    GFR Estimate If Black >90 >60 mL/min/[1.73_m2]    Calcium 8.9 8.5 - 10.1 mg/dL    Bilirubin Total 0.3 0.2 - 1.3 mg/dL    Albumin 3.5 3.4 - 5.0 g/dL    Protein Total 8.8 6.8 - 8.8 g/dL    Alkaline Phosphatase 86 40 - 150 U/L    ALT 31 0 - 50 U/L    AST 20 0 - 45 U/L   ABO/Rh type and screen    Collection Time: 07/11/20  8:06 PM   Result Value Ref Range    ABO O     RH(D) Pos     Antibody Screen Pos (A)     Test Valid Only At          [facility]    Specimen Expires 07/14/2020     Blood Bank Comment       Delay in availability of Red Blood Cells called to   Tea Cooney RN in [facility] 2215 01/11/20 by BRIANNE     INR    Collection Time: 07/11/20  8:06 PM   Result Value Ref Range    INR 1.05 0.86 - 1.14   Partial thromboplastin time    Collection Time: 07/11/20  8:06 PM   Result Value Ref Range    PTT 33 22 - 37 sec   hCG qual urine POCT    Collection Time: 07/11/20  9:34 PM   Result Value Ref Range    HCG Qual Urine Negative neg    Internal QC OK Yes    UPPER GI ENDOSCOPY    Collection Time: 07/11/20  9:58 PM   Result Value Ref Range    Upper GI Endoscopy       [address] [place] [address] [phone]     Endoscopy Department  _______________________________________________________________________________  Patient Name: Nevin Alvarado     Procedure Date: 7/11/2020 9:58 PM  MRN: [id number]                       Account Number: [id number]  YOB: 1990             Admit Type: Outpatient  Age: 29                                Gender: Female  Note Status: Finalized                Attending MD: Lyndsey rCoft MD  Total Sedation Time:                    _______________________________________________________________________________     Procedure:           Upper GI endoscopy  Indications:          Foreign body in the stomach  Providers:           Lyndsey Croft MD, Keith Valdez MD  Patient Profile:     A 29 year old female with frequent foreign body                        ingestions. Here after swallowing a paper clip. Upper                        endoscopy for removal.  Referr ing MD:          Medicines:           General Anesthesia  Complications:       No immediate complications.  _______________________________________________________________________________  Procedure:           Pre-Anesthesia Assessment:                       - Prior to the procedure, a History and Physical was                        performed, and patient medications and allergies were                        reviewed. The patient is competent. The risks and                        benefits of the procedure and the sedation options and                        risks were discussed with the patient. All questions                        were answered and informed consent was obtained. Patient                        identification and proposed procedure were verified by                        the physician, the nurse and the anesthesiologist in the                        procedure room. Mental Status Examination: normal.                        Airway Examination: normal oropharyngeal airway a nd neck                        mobility. Respiratory Examination: clear to                        auscultation. CV Examination: normal. Prophylactic                        Antibiotics: The patient does not require prophylactic                        antibiotics. Prior Anticoagulants: The patient has taken                        no previous anticoagulant or antiplatelet agents. ASA                        Grade Assessment: II - A patient with mild systemic                        disease. After reviewing the risks and benefits, the                        patient was deemed in satisfactory condition to undergo                        the  procedure. The anesthesia plan was to use general                        anesthesia. Immediately prior to administration of                        medications, the patient was re-assessed for adequacy to                        receive sedatives. The heart rate, respiratory rate,                        oxygen saturations, blood pressure, adequacy of                         pulmonary ventilation, and response to care were                        monitored throughout the procedure. The physical status                        of the patient was re-assessed after the procedure.                       After obtaining informed consent, the endoscope was                        passed under direct vision. Throughout the procedure,                        the patient's blood pressure, pulse, and oxygen                        saturations were monitored continuously. The was                        introduced through the mouth, and advanced to the second                        part of duodenum. The upper GI endoscopy was                        accomplished without difficulty. The patient tolerated                        the procedure well.                                                                                   Findings:       One benign-appearing, intrinsic mild stenosis was found 23 cm from the        incisors. The stenosis was traversed.        Food was found in the entire esophagus.       The exam of the esophagus was otherwise normal.       Straightened paper clip were found in the gastric body. Removal was        accomplished with a rat-toothed forceps while using the simons. Given        latex allergy, patient was medicated with IV diphenhydramine during        procedure.       A single localized, superficial non-bleeding erosion was found at the        antrum likely from tip of swallowed clip. There were no stigmata of        recent bleeding.       The exam of the stomach was otherwise normal.       The examined  duodenum was normal.                                                                                   Impression:          - Benign-appearing esophageal stenosis.                       - Food in the esophagus.                       - Straightened paper clip were found in the stomach.                        Removal was successful.                       - Non-bleeding erosive gastropathy.                       - No rmal examined duodenum.  Recommendation:      - Admit the patient to hospital [facility] for observation.                       - clear liquid diet tonight - can advance tomorrow if                        doing well                       - Daily PPI                       - Evaluation by psychiatry prior to discharge given                        repeated episodes of foreign body ingestions and prior                        perforations as this is a life threatening condition.                                                                                     Electronically Signed by Dr. Croft  ______________________  Lyndsey Croft MD  7/11/2020 10:36:36 PM  I was physically present for the entire viewing portion of the exam.  __________________________  Signature of teaching physician  Amairani/Jennifer Croft MD    __________________  Keith Valdez MD  Number of Addenda: 0    Note Initiated On: 7/11/2020 9:58 PM  Scope In:  Scope Out:            Physical and Psychiatric Examination:     /81 (BP Location: Left arm)   Pulse 74   Temp 96.6  F (35.9  C) (Oral)   Resp 16   Wt 118.8 kg (261 lb 14.4 oz)   SpO2 97%   BMI 47.90 kg/m    Weight is 261 lbs 14.4 oz  Body mass index is 47.9 kg/m .    Physical Exam:  I have reviewed the physical exam as documented by by the medical team and agree with findings and assessment and have no additional findings to add at this time.    Mental Status Exam:  She recognized me from when I saw her in 2018. Is lying quietly in the hospital bed, is well groomed,  "pleasant, cooperative. Speech fluent. Moves upper extremities without difficulty. Associations tight. Mood is \"OK.\"  Affect congruent. Thought process logical, linear. Thought content negative for suicidal thoughts or delusions. Oriented x3.  Recent and remote memory, attention span and concentration are intact. Fund of knowledge, use of language appropriate. Insight and judgment fair.              DSM-5 Diagnosis:   PTSD, unspecified anxiety disorder, borderline personality disorder           Assessment:   She has a long history of coping by ingesting foreign bodies, but aside from the event prior to this admission, she has been doing very well. Has robust services in place with psychiatrist, therapist, DBT group, ILS worker, etc. Now feels fine and she is confident that she will do well going home. Does not meet criteria for admission to  psychiatry.           Summary of Recommendations:   I have discontinued the 72 hour hold and beside attendant.   Page me at [phone] as needed.        \"This dictation was performed with voice recognition software and may contain errors,  omissions and inadvertent word substitution.\"           "

## 2020-07-12 NOTE — PROGRESS NOTES
Social Work Services Progress Note    Hospital Day: 2    Collaborated with:  Bedside RN, Henny [id number]    Data:  Transportation    Intervention:  Writer informed by pt's bedside RN that she is ready for discharge but does not have transportation. Writer contacted [employer] [phone] and was told the patient needs discharge papers in hand;  she can call the number and select prompts to speak with a nurse. The nurse will then call and arrange for cab to come and collect pt.    This information was provided to pt's bedside RN.    Assessment:  anticipate pt will be ready for discharge today, can call for her own transportation    Plan:    Anticipated Disposition:  Home, no needs identified    Barriers to d/c plan:  None noted    Follow Up:  Proceed to Discharge.    DYLAN Tenorio, MSW      [place] [employer]     Text paging available through [employer] on [employer] - search SOCIAL WORK     ON CALL PAGER 0800 - [phone]  ON CALL COVERAGE AFTER 1600 [phone]

## 2020-07-12 NOTE — H&P
[facility], [place]    History and Physical - Hospitalist Service, [facility]       Date of Admission:  7/11/2020    Assessment & Plan   Nevin Alvarado is a 29 year old woman admitted on 7/11/2020. She has a history of pulmonary embolus, BPD, ADD, anorexia, PTSD and foreign body ingestion    1) Ingested paper clip - Patient ingested a paper clip today during an episode of anxiety.  She has had multiple admissions for foreign body ingestion and is familiar to our service but has actually done remarkable well recently.  Her last ingestion was 4 months prior which compares quite well with almost weekly admissions in Fall of last year.  - Consult psychiatry.  I would be leery of inpatient psychiatric admission given her remarkable improvement at home.  It sounds like she has group therapy, individual therapy and a good relationship with her outpatient psychiatrist.  - Consult GI for potential EGD  - NPO save for ice chips and meds, LR @ 75 ccs/hr  - Tylenol PRN pain  - Patient previously had a guardian but this has been ended by the court  - Sitter, swallow precautions  - COVID screen prior to procedure    2) History of PTSD, BPD, ADD  - Continue home buspirone, desvenlafaxine, hydroxyzine, lurasidone, pregabalin, topiramate    3) History of pulmonary embolism  - No longer on anticoagulation    4) History of granuloma annulare  - Continue Plaquenil    5) Metabolic syndrome  - Continue metformin    6) Fluid overload - Just started on lasix 20 mg daily x7 days by her PCP.  Will hold given NPO status.    Diet: NPO  DVT Prophylaxis: Pneumatic Compression Devices  Hazel Catheter: not present  Code Status: Full         Disposition Plan   Expected discharge: Tomorrow, recommended to prior living arrangement once seen by GI, psychiatry.  Entered: HU Davis CNP 07/11/2020, 8:34 PM     The patient's care was discussed with the Attending Physician, Dr. Pena.    Ken Sims  Cristy, APRN CNP  [facility], [place]  Pager: [phone]  Please see sticky note for cross cover information  ______________________________________________________________________    Chief Complaint   Ingested foreign body    History is obtained from the patient    History of Present Illness   Nevin Alvarado is a 29 year old woman admitted on 7/11/2020. She has a history of pulmonary embolus, BPD, ADD, anorexia, PTSD and foreign body ingestion.    The patient reports that today she was trying to take a nap when she had an episode of anxiety and ingested a single unfolded paperclip.  She denies any significant abdominal pain afterwards.    The patient has a long history of ingestion and foreign body insertion.  She is actually doing remarkably well at present compared to when I have seen her in the past.  Last fall she was being admitted multiple times a week for foreign body ingestion.  She had another episode in January, a 2-month break, one further ingestion and has not actually ingested anything in the past 3 months.  She attributes this success to a good relationship with her psychiatrist, group therapy, individual therapy and living on her own independently.  She did have a court appointed guardian that this ended a few months ago.    She is worried that she will be placed in inpatient psychiatry again.  She believes she would do better as an outpatient.    Otherwise she denies any recent issues.  She denies any fevers, chills, cough, chest pain, shortness of breath, nausea, vomiting or changes in her bowel movements.    Review of Systems    The 10 point Review of Systems is negative other than noted in the HPI or here.     Past Medical History    I have reviewed this patient's medical history and updated it with pertinent information if needed.   Past Medical History:   Diagnosis Date     ADD (attention deficit disorder)      Anorexia nervosa with bulimia     history of; on Topamax      Anxiety      Borderline personality disorder (H)      Depression      H/O self-harm      History of pulmonary embolism 12/2019    Provoked. Completed 3 month course of Apixaban     Morbid obesity (H)      Neuropathy      PTSD (post-traumatic stress disorder)      Rectal foreign body - Recurrent issue, self placed      Swallowed foreign body - Recurrent issue, self placed        Past Surgical History   I have reviewed this patient's surgical history and updated it with pertinent information if needed.  Past Surgical History:   Procedure Laterality Date     COMBINED ESOPHAGOSCOPY, GASTROSCOPY, DUODENOSCOPY (EGD), REPLACE ESOPHAGEAL STENT N/A 10/9/2019    Procedure: Upper Endoscopy with Suture Placement;  Surgeon: Zurdo Ramirez MD;  [facility]     ESOPHAGOSCOPY, GASTROSCOPY, DUODENOSCOPY (EGD), COMBINED N/A 3/9/2017    Procedure: COMBINED ESOPHAGOSCOPY, GASTROSCOPY, DUODENOSCOPY (EGD), REMOVE FOREIGN BODY;  Surgeon: Avis Guzmán MD;  [facility]     ESOPHAGOSCOPY, GASTROSCOPY, DUODENOSCOPY (EGD), COMBINED N/A 4/20/2017    Procedure: COMBINED ESOPHAGOSCOPY, GASTROSCOPY, DUODENOSCOPY (EGD), REMOVE FOREIGN BODY;  EGD removal Foregin body;  Surgeon: Lokesh Paula MD;  [facility]     ESOPHAGOSCOPY, GASTROSCOPY, DUODENOSCOPY (EGD), COMBINED N/A 6/12/2017    Procedure: COMBINED ESOPHAGOSCOPY, GASTROSCOPY, DUODENOSCOPY (EGD);  COMBINED ESOPHAGOSCOPY, GASTROSCOPY, DUODENOSCOPY (EGD) [[id number]]attempted removal of foreign body;  Surgeon: Pamela Perez MD;  [facility]     ESOPHAGOSCOPY, GASTROSCOPY, DUODENOSCOPY (EGD), COMBINED N/A 6/9/2017    Procedure: COMBINED ESOPHAGOSCOPY, GASTROSCOPY, DUODENOSCOPY (EGD), REMOVE FOREIGN BODY;  Esophagoscopy, Gastroscopy, Duodenoscopy, Removal of Foreign Body;  Surgeon: Dejon Marsh MD;  [facility]     ESOPHAGOSCOPY, GASTROSCOPY, DUODENOSCOPY (EGD), COMBINED N/A 1/6/2018    Procedure: COMBINED ESOPHAGOSCOPY,  GASTROSCOPY, DUODENOSCOPY (EGD), REMOVE FOREIGN BODY;  COMBINED ESOPHAGOSCOPY, GASTROSCOPY, DUODENOSCOPY (EGD) [by pascal net and snare with profol sedation;  Surgeon: Feliciano Emmanuel MD;  Location:  GI     ESOPHAGOSCOPY, GASTROSCOPY, DUODENOSCOPY (EGD), COMBINED N/A 3/19/2018    Procedure: COMBINED ESOPHAGOSCOPY, GASTROSCOPY, DUODENOSCOPY (EGD), REMOVE FOREIGN BODY;   Esophagodscopy, Gastroscopy, Duodenoscopy,Foreign Body Removal;  Surgeon: Brice Guzmán MD;  [facility]     ESOPHAGOSCOPY, GASTROSCOPY, DUODENOSCOPY (EGD), COMBINED N/A 4/16/2018    Procedure: COMBINED ESOPHAGOSCOPY, GASTROSCOPY, DUODENOSCOPY (EGD), REMOVE FOREIGN BODY;  Esophagogastroduodenoscopy  Foreign Body Removal X 2;  Surgeon: Royer Moise MD;  [facility]     ESOPHAGOSCOPY, GASTROSCOPY, DUODENOSCOPY (EGD), COMBINED N/A 6/1/2018    Procedure: COMBINED ESOPHAGOSCOPY, GASTROSCOPY, DUODENOSCOPY (EGD), REMOVE FOREIGN BODY;  COMBINED ESOPHAGOSCOPY, GASTROSCOPY, DUODENOSCOPY with removal of foreign body, propofol sedation by anesthesia;  Surgeon: Brice Martinez MD;  Location:  GI     ESOPHAGOSCOPY, GASTROSCOPY, DUODENOSCOPY (EGD), COMBINED N/A 7/25/2018    Procedure: COMBINED ESOPHAGOSCOPY, GASTROSCOPY, DUODENOSCOPY (EGD), REMOVE FOREIGN BODY;;  Surgeon: Candy Castelan MD;  Location:  GI     ESOPHAGOSCOPY, GASTROSCOPY, DUODENOSCOPY (EGD), COMBINED N/A 7/28/2018    Procedure: COMBINED ESOPHAGOSCOPY, GASTROSCOPY, DUODENOSCOPY (EGD), REMOVE FOREIGN BODY;  COMBINED ESOPHAGOSCOPY, GASTROSCOPY, DUODENOSCOPY (EGD), REMOVE FOREIGN BODY;  Surgeon: Brice Guzmán MD;  [facility]     ESOPHAGOSCOPY, GASTROSCOPY, DUODENOSCOPY (EGD), COMBINED N/A 7/31/2018    Procedure: COMBINED ESOPHAGOSCOPY, GASTROSCOPY, DUODENOSCOPY (EGD);  COMBINED ESOPHAGOSCOPY, GASTROSCOPY, DUODENOSCOPY (EGD) TO REMOVE FOREIGN BODY;  Surgeon: Lokesh Paula MD;  [facility]     ESOPHAGOSCOPY, GASTROSCOPY, DUODENOSCOPY  (EGD), COMBINED N/A 8/4/2018    Procedure: COMBINED ESOPHAGOSCOPY, GASTROSCOPY, DUODENOSCOPY (EGD), REMOVE FOREIGN BODY;   combined esophagoscopy, gastroscopy, duodenoscopy, REMOVE FOREIGN BODY ;  Surgeon: Lkoesh Paula MD;  [facility]     ESOPHAGOSCOPY, GASTROSCOPY, DUODENOSCOPY (EGD), COMBINED N/A 10/6/2019    Procedure: ESOPHAGOGASTRODUODENOSCOPY (EGD) with fireign body removal x2, esophageal stent placement with floroscopy;  Surgeon: Timoteo Espana MD;  [facility]     ESOPHAGOSCOPY, GASTROSCOPY, DUODENOSCOPY (EGD), COMBINED N/A 12/2/2019    Procedure: Esophagogastroduodenoscopy with esophageal stent removal, esophogram;  Surgeon: Kailee Tena MD;  [facility]     ESOPHAGOSCOPY, GASTROSCOPY, DUODENOSCOPY (EGD), COMBINED N/A 12/17/2019    Procedure: ESOPHAGOGASTRODUODENOSCOPY, WITH FOREIGN BODY REMOVAL;  Surgeon: Pamela Perez MD;  [facility]     ESOPHAGOSCOPY, GASTROSCOPY, DUODENOSCOPY (EGD), COMBINED N/A 12/13/2019    Procedure: ESOPHAGOGASTRODUODENOSCOPY, WITH FOREIGN BODY REMOVAL;  Surgeon: Samia Stanton MD;  [facility]     ESOPHAGOSCOPY, GASTROSCOPY, DUODENOSCOPY (EGD), COMBINED N/A 12/28/2019    Procedure: ESOPHAGOGASTRODUODENOSCOPY (EGD) Removal of Foreign Body X 2;  Surgeon: Huy Kelley MD;  [facility]     ESOPHAGOSCOPY, GASTROSCOPY, DUODENOSCOPY (EGD), COMBINED N/A 1/5/2020    Procedure: ESOPHAGOGASTRODUOENOSCOPY WITH FOREIGN BODY REMOVAL;  Surgeon: Pamela Perez MD;  [facility]     ESOPHAGOSCOPY, GASTROSCOPY, DUODENOSCOPY (EGD), COMBINED N/A 1/3/2020    Procedure: ESOPHAGOGASTRODUODENOSCOPY (EGD) REMOVAL OF FOREIGN BODY.;  Surgeon: Pamela Perez MD;  [facility]     ESOPHAGOSCOPY, GASTROSCOPY, DUODENOSCOPY (EGD), COMBINED N/A 1/13/2020    Procedure: ESOPHAGOGASTRODUODENOSCOPY (EGD) for foreign body removal;  Surgeon: Lokesh Paula MD;  [facility]     ESOPHAGOSCOPY,  GASTROSCOPY, DUODENOSCOPY (EGD), COMBINED N/A 1/18/2020    Procedure: Diagnostic ESOPHAGOGASTRODUODENOSCOPY (EGD;  Surgeon: Lokesh Paula MD;  [facility]     ESOPHAGOSCOPY, GASTROSCOPY, DUODENOSCOPY (EGD), COMBINED N/A 3/29/2020    Procedure: UPPER ENDOSCOPY WITH FOREIGN BODY REMOVAL;  Surgeon: Doug Hansen MD;  [facility]     EXAM UNDER ANESTHESIA ANUS N/A 1/10/2017    Procedure: EXAM UNDER ANESTHESIA ANUS;  Surgeon: Annmarie Haynes MD;  [facility]     EXAM UNDER ANESTHESIA RECTUM N/A 7/19/2018    Procedure: EXAM UNDER ANESTHESIA RECTUM;  EXAM UNDER ANESTHESIA, REMOVAL OF RECTAL FOREIGN BODY;  Surgeon: Annmarie Haynes MD;  [facility]     HC REMOVE FECAL IMPACTION OR FB W ANESTHESIA N/A 12/18/2016    Procedure: REMOVE FECAL IMPACTION/FOREIGN BODY UNDER ANESTHESIA;  Surgeon: Soham Cano MD;  [facility]: [facility]     KNEE SURGERY - removed a small tissue mass from knee Right      LAPAROSCOPIC ABLATION ENDOMETRIOSIS       LAPAROTOMY EXPLORATORY N/A 1/10/2017    Procedure: LAPAROTOMY EXPLORATORY;  Surgeon: Annmarie Haynse MD;  [facility]     LAPAROTOMY EXPLORATORY  09/11/2019    Manual manipulation of bowel to remove pill bottle in rectum     lymph nodes removed from neck; benign  age 7     MAMMOPLASTY REDUCTION Bilateral      RELEASE CARPAL TUNNEL Bilateral      SIGMOIDOSCOPY FLEXIBLE N/A 1/10/2017    Procedure: SIGMOIDOSCOPY FLEXIBLE;  Surgeon: Annmarie Haynes MD;  [facility]     SIGMOIDOSCOPY FLEXIBLE N/A 5/8/2018    Procedure: SIGMOIDOSCOPY FLEXIBLE;  flex sig with foreign body removal using snare and rattooth forcep;  Surgeon: Soham Cano MD;  Location:  GI     SIGMOIDOSCOPY FLEXIBLE N/A 2/20/2019    Procedure: Exam under anesthesia Colonoscopy with attempted  removal of rectal foreign body;  Surgeon: Estrada Chávez MD;  [facility]       Social History   I have reviewed this patient's social history and updated it  with pertinent information if needed.      Family History   I have reviewed this patient's family history and updated it with pertinent information if needed.  Family History   Problem Relation Age of Onset     Diabetes Type 2  Maternal Grandmother      Diabetes Type 2  Paternal Grandmother      Breast Cancer Paternal Grandmother      Cerebrovascular Disease Father 53     Myocardial Infarction No family hx of      Coronary Artery Disease Early Onset No family hx of      Ovarian Cancer No family hx of      Colon Cancer No family hx of        Prior to Admission Medications   Prior to Admission Medications   Prescriptions Last Dose Informant Patient Reported? Taking?   Cholecalciferol (VITAMIN D) 50 MCG ( UT) CAPS  Self Yes No   Sig: Take 2,000 Units by mouth daily    acetaminophen (TYLENOL) 32 mg/mL liquid   No No   Sig: Take 31.25 mLs (1,000 mg) by mouth every 6 hours as needed for fever or pain   busPIRone (BUSPAR) 10 MG tablet  Self Yes No   Sig: Take 1 tablet (10 mg) by mouth twice daily   cloNIDine (CATAPRES) 0.1 MG tablet  Self Yes No   Sig: Take 0.1 mg by mouth 2 times daily    desvenlafaxine (PRISTIQ) 100 MG 24 hr tablet  Self Yes No   Sig: Take 1 tablet (100 mg) by mouth every morning   docosanol (ABREVA) 10 % CREA cream   Yes No   Sig: Apply to lip 5 times a day as soon as symptoms begin, do not use for more than 10 days. Used PRN.   hydrOXYzine (ATARAX) 50 MG tablet   No No   Sig: Take 1 tablet (50 mg) by mouth 3 times daily as needed for anxiety   hydroxychloroquine (PLAQUENIL) 200 MG tablet   Yes Yes   Sig: Take 200 mg by mouth 2 times daily   levonorgestrel (MIRENA) 20 MCG/24HR IUD   Yes No   Si each by Intrauterine route once   lurasidone (LATUDA) 60 MG TABS tablet  Self Yes No   Sig: Take 1 tablet (60 mg) by mouth at bedtime   metFORMIN (GLUCOPHAGE-XR) 500 MG 24 hr tablet  Self Yes No   Sig: Take 1 tablet (500 mg) by mouth daily   ondansetron (ZOFRAN-ODT) 4 MG ODT tab   Yes No   Sig: Take 4 mg  by mouth every 6 hours as needed for nausea or vomiting    pregabalin (LYRICA) 50 MG capsule   Yes Yes   Sig: Take 50 mg by mouth 3 times daily   topiramate (TOPAMAX) 100 MG tablet  Self Yes No   Sig: Take 1 tablet (100 mg) by mouth daily at bedtime      Facility-Administered Medications: None     Allergies   Allergies   Allergen Reactions     Amoxicillin-Pot Clavulanate Other (See Comments), Rash and Swelling     PN: facial swelling, left side. Also had cortisone injection the same day as she started the Augmentin.  PN: facial swelling, left side. Also had cortisone injection the same day as she started the Augmentin.  Noted in downtime recovery of chart.       Penicillins Anaphylaxis     Vancomycin Swelling, Itching and Rash     Other reaction(s): Redness  Flushed face, very itchy; HUT Comment: Flushed face, very itchy; HUT Reaction: Angioedema; HUT Reaction: Redness; HUT Severity: Med; HUT Noted: [id number]  Flushed face, very itchy  Flushed face, very itchy  Flushed face, very itchy       Hydrocodone Nausea and Vomiting and GI Disturbance     vomiting for days  vomiting for days  vomiting for days  vomiting for days, PN: vomiting for days  vomiting for days  vomiting for days  vomiting for days  vomiting for days  vomiting for days  vomiting for days, PN: vomiting for days  vomiting for days  vomiting for days  vomiting for days  vomiting for days  ; HUT Comment: vomiting for days; HUT Reaction: Gastrointestinal; HUT Reaction: Nausea And Vomiting; HUT Reaction Type: Intolerance; HUT Severity: Med; HUT Noted: [id number]  vomiting for days       Blood-Group Specific Substance Other (See Comments)     Patient has an anti-Cw and non-specific antibodies. Blood product orders may be delayed. Draw one red top and two purple top tubes for all type/screen/crossmatch orders.     Influenza Vaccines Other (See Comments)     Oseltamivir Hives     med stopped, PN: med stopped     Cephalosporins Rash     Lamotrigine Rash      Possibly associated with Lamictal.   HUT Comment: Possibly associated with Lamictal. ; HUT Reaction: Rash; HUT Reaction Type: Allergy; HUT Severity: Low; HUT Noted: [id number]     Latex Rash       Physical Exam   Vital Signs: Temp: 98.7  F (37.1  C) Temp src: Oral BP: (!) 144/88 Pulse: 91   Resp: 16 SpO2: 99 % O2 Device: None (Room air)    Weight: 261 lbs 14.4 oz    Physical Exam   Constitutional:   Well nourished, well developed, resting comfortably   Head: Normocephalic and atraumatic.   Eyes: Conjunctivae are normal. Pupils are equal, round, and reactive to light.  Pharynx has no erythema or exudate, mucous membranes are moist  Neck:   No adenopathy, no bony tenderness  Cardiovascular: Regular rate and rhythm without murmurs or gallops  Pulmonary/Chest: Clear to auscultation bilaterally, with no wheezes or retractions. No respiratory distress.  GI: Soft with good bowel sounds.  Non-tender, non-distended, with no guarding, no rebound, no peritoneal signs.   Back:  No bony or CVA tenderness   Musculoskeletal:  No edema or clubbing   Skin: Skin is warm and dry. No rash noted.   Neurological: Alert and oriented to person, place, and time. Nonfocal exam  Psychiatric:  Normal mood and affect.    Data   Data reviewed today: I reviewed all medications, new labs and imaging results over the last 24 hours. I personally reviewed her labs and imaging from today.

## 2020-07-12 NOTE — ANESTHESIA POSTPROCEDURE EVALUATION
Anesthesia POST Procedure Evaluation    Patient: Nevin Alvarado   MRN:     [id number] Gender:   female   Age:    29 year old :      1990        Preoperative Diagnosis: Foreign body of small intestine [T18.3XXA]   Procedure(s):  ESOPHAGOGASTRODUODENOSCOPY (EGD); Upper Endoscopy WITH FOREIGN BODY REMOVAL   Postop Comments: No value filed.     Anesthesia Type: General       Disposition: Admission   Postop Pain Control: Uneventful            Sign Out: Well controlled pain   PONV: No   Neuro/Psych: Uneventful            Sign Out: Acceptable/Baseline neuro status   Airway/Respiratory: Uneventful            Sign Out: Acceptable/Baseline resp. status   CV/Hemodynamics: Uneventful            Sign Out: Acceptable CV status   Other NRE: NONE   DID A NON-ROUTINE EVENT OCCUR? No    Event details/Postop Comments:  I assessed the patient in PACU prior to discharge.  The patient was awake and alert with minimal to moderate pain which was well controlled with medications.  The patient denied any significant nausea.  The patient's heart rate and blood pressure with consistent with her preoperative measurements, and she was breathing comfortably without any signs of respiratory distress.  There were no readily apparent anesthetic complications.  All her questions were answered.         Last Anesthesia Record Vitals:  CRNA VITALS  2020 2230 - 2020 2330      2020             Pulse:  99    SpO2:  99 %    EKG:  Sinus rhythm          Last PACU Vitals:  Vitals Value Taken Time   /91 2020 11:20 PM   Temp     Pulse 103 2020 11:20 PM   Resp     SpO2 94 % 2020 11:28 PM   Temp src     NIBP     Pulse     SpO2     Resp     Temp     Ht Rate     Temp 2     Vitals shown include unvalidated device data.      Electronically Signed By: Jason Back MD, 2020, 11:30 PM

## 2020-07-12 NOTE — PLAN OF CARE
Nursing Focus: Admission  D: Arrived at 0015 from PACU via stretcher. Patient accompanied by N/A. Admitted for swallowing of foreign object. Complains of abdominal discomfort.      I: Admission process began.  Patient oriented to room, enviroment, call light.  MD notified of patient's arrival on unit.     A: Vital signs stable, afebrile.  Patient stable at this time.  Complaining of abdominal discomfort.     P: Implement plan of care when available. Continue to monitor patient. Nursing interventions as appropriate. Notify MD with changes in pt status.    Pt arrived to 7D from PACU after EGD and removal of paper clip from abdomen. Slightly hypertensive to 130s/80s but OVSS on 2 L NC; pt's sats 88% on RA. On capnography monitoring. Received tylenol x1 for abdominal discomfort and benadryl x1 for itching. Clear liquid diet ordered and pt has been tolerating well. Pt is on a 72 hour hold pending psych assessment. Sitter at bedside for safety and all small items that could be swallowed are out of pt's reach. Up with SBA. Continue with POC.

## 2020-07-12 NOTE — DISCHARGE SUMMARY
[facility], [place]  Hospitalist Discharge Summary      Date of Admission:  7/11/2020  Date of Discharge:  7/12/2020  Discharging Provider: Quinten Lamb MD  Discharge Team: Hospitalist Service, Gold 8    Discharge Diagnoses   Foreign Body ingestion - s/p removal by EGD    Follow-ups Needed After Discharge   Follow-up Appointments     Adult [facility] Follow-up and recommended labs and tests      Follow up with primary care provider, Latonya Knight, within 2 weeks   for hospital follow- up.  No follow up labs or test are needed.  Can   discuss timing to stop protonix    Follow up with usual psychiatrist and therapy.    Appointments on [place] and/or [facility] (with [facility] or [facility]   provider or service). Call [phone] if you haven't heard regarding   these appointments within 7 days of discharge.             Unresulted Labs Ordered in the Past 30 Days of this Admission     Date and Time Order Name Status Description    7/11/2020 2124 Asymptomatic COVID-19 Virus (Coronavirus) by PCR In process         Discharge Disposition   Discharged to home  Condition at discharge: Stable      Hospital Course   Nevin Alvarado is a 29 year old woman admitted on 7/11/2020. She has a history of pulmonary embolus, BPD, ADD, anorexia, PTSD and foreign body ingestion     1) Ingested paper clip - Patient ingested a paper clip prior to admission during an episode of anxiety.  She has had multiple admissions for foreign body ingestion,  but has actually done remarkable well recently.  Her last ingestion was 4 months prior.  Paper clip removed by EGD without complications.  She started clears and tolerated.  Diet was advanced and she did well with this as well.  Initially had sitter and after psychiatry saw in am she was deemed low risk to repeat and sitter was discontinued and she was deemed okay to discharge with usual meds and support.    She should use protonix daily for next 2 months  per gi    2) History of PTSD, BPD, ADD  - Continue home buspirone, desvenlafaxine, hydroxyzine, lurasidone, pregabalin, topiramate     3) History of pulmonary embolism  - No longer on anticoagulation     4) History of granuloma annulare  - Continue Plaquenil     5) Metabolic syndrome  - Continue metformin     6) Fluid overload - Just started on lasix 20 mg daily x7 days by her PCP.  held in hosp, restarted on discharge    Consultations This Hospital Stay   GI LUMINAL ADULT IP CONSULT  PSYCHIATRY IP CONSULT    Code Status   Full Code    Time Spent on this Encounter   I, Quinten Lamb MD, personally saw the patient today and spent greater than 30 minutes discharging this patient.       Quinten Lamb MD  [facility], [place]  ______________________________________________________________________    Physical Exam   Vital Signs: Temp: 96.6  F (35.9  C) Temp src: Oral BP: 125/81 Pulse: 74 Heart Rate: 90 Resp: 16 SpO2: 97 % O2 Device: Nasal cannula Oxygen Delivery: 2 LPM  Weight: 261 lbs 14.4 oz   WDWN NAD  Lung ctab  Heart rrr without m  abd soft +BS NT  Ext well perfused   Neuro alert and oriented X 4, appropriate mood and affect  ----------------------------------------------------------------------------------------       Primary Care Physician   Latonya Knight    Discharge Orders      Reason for your hospital stay    You were admitted after swallowing a paper clip.  It was removed by upper endoscopy without problem.  You resumed normal diet and are doing fine.  You have good support services at home and can resume your prior medications and support.     Adult [facility] Follow-up and recommended labs and tests    Follow up with primary care provider, Latonya Knight, within 2 weeks for hospital follow- up.  No follow up labs or test are needed.  Can discuss timing to stop protonix    Follow up with usual psychiatrist and therapy.    Appointments on [place] and/or [place] (with [facility] or [facility] provider or service). Call [phone] if you haven't heard regarding these appointments within 7 days of discharge.     Activity    Your activity upon discharge: activity as tolerated     Full Code     Diet    Follow this diet upon discharge: Orders Placed This Encounter      Regular Diet Adult       Significant Results and Procedures   Most Recent 3 CBC's:  Recent Labs   Lab Test 07/11/20 2006 03/29/20 1737 02/15/20  2324   WBC 10.5 9.7 8.1   HGB 12.3 11.6* 11.5*   MCV 87 89 89    310 283     Most Recent 3 BMP's:  Recent Labs   Lab Test 07/11/20  2006 03/29/20  1737 02/15/20  2324    142 141   POTASSIUM 3.4 3.4 3.8   CHLORIDE 109 110* 112*   CO2 24 22 21   BUN 9 8 12   CR 0.62 0.58 0.58   ANIONGAP 5 9 8   KELBY 8.9 8.6 9.0   GLC 94 122* 120*   ,   Results for orders placed or performed during the hospital encounter of 07/11/20   XR Abdomen 1 View    Narrative    EXAM: XR ABDOMEN 1 VW  7/11/2020 7:30 PM     HISTORY:  swallowed metallic foreign body       COMPARISON:  3/29/2020    FINDINGS:   Portable supine views of the abdomen. There is redemonstration of a  linear radiopaque density projecting over the mid abdomen, measuring  12.7 cm long, not substantially changed in position compared to prior  examination. Nonobstructive bowel gas pattern. No visualized  pneumatosis or portal venous gas. Lung bases are clear. No acute  osseous abnormality. A catheter projects with tip overlying the high  right atrium.    Nonobstructive bowel gas pattern. No portal venous gas, pneumatosis or  free air in the abdomen.    The included osseous structures and soft tissues are within normal  limits.     The lung bases are clear.      Impression    IMPRESSION: Nonobstructive bowel gas pattern.     I have personally reviewed the examination and initial interpretation  and I agree with the findings.    CHINO PERAZA MD       Discharge Medications   Current Discharge Medication List      START  taking these medications    Details   pantoprazole (PROTONIX) 40 MG EC tablet Take 1 tablet (40 mg) by mouth daily  Qty: 30 tablet, Refills: 1    Comments: May stop after 8 weeks  Associated Diagnoses: Swallowed foreign body, subsequent encounter         CONTINUE these medications which have NOT CHANGED    Details   busPIRone (BUSPAR) 10 MG tablet Take 1 tablet (10 mg) by mouth twice daily      Cholecalciferol (VITAMIN D) 50 MCG (2000 UT) CAPS Take 2,000 Units by mouth daily       cloNIDine (CATAPRES) 0.1 MG tablet Take 0.1 mg by mouth 2 times daily       desvenlafaxine (PRISTIQ) 100 MG 24 hr tablet Take 1 tablet (100 mg) by mouth every morning      hydroxychloroquine (PLAQUENIL) 200 MG tablet Take 200 mg by mouth 2 times daily      lurasidone (LATUDA) 60 MG TABS tablet Take 1 tablet (60 mg) by mouth at bedtime      metFORMIN (GLUCOPHAGE-XR) 500 MG 24 hr tablet Take 1,000 mg by mouth Take 1 tablet (500 mg) by mouth daily       norethindrone (MICRONOR) 0.35 MG tablet Take 0.35 mg by mouth daily      pregabalin (LYRICA) 50 MG capsule Take 50 mg by mouth 3 times daily      Prenatal Vit-Fe Fumarate-FA (PNV PRENATAL PLUS MULTIVITAMIN) 27-1 MG TABS per tablet Take 1 tablet by mouth daily      topiramate (TOPAMAX) 100 MG tablet Take 50 mg by mouth Take 1 tablet (100 mg) by mouth daily at bedtime       acetaminophen (TYLENOL) 32 mg/mL liquid Take 31.25 mLs (1,000 mg) by mouth every 6 hours as needed for fever or pain  Qty: 355 mL, Refills: 0    Associated Diagnoses: Esophageal dysphagia; Hx of foreign body ingestion      albuterol (PROAIR HFA/PROVENTIL HFA/VENTOLIN HFA) 108 (90 Base) MCG/ACT inhaler Inhale 2 puffs into the lungs as needed for shortness of breath / dyspnea or wheezing    Comments: Pharmacy may dispense brand covered by insurance (Proair, or proventil or ventolin or generic albuterol inhaler)      docosanol (ABREVA) 10 % CREA cream Apply to lip 5 times a day as soon as symptoms begin, do not use for more than  10 days. Used PRN.      hydrOXYzine (ATARAX) 50 MG tablet Take 1 tablet (50 mg) by mouth 3 times daily as needed for anxiety  Qty: 30 tablet, Refills: 0    Associated Diagnoses: History of posttraumatic stress disorder (PTSD)      ondansetron (ZOFRAN-ODT) 4 MG ODT tab Take 4 mg by mouth every 6 hours as needed for nausea or vomiting            Allergies   Allergies   Allergen Reactions     Amoxicillin-Pot Clavulanate Other (See Comments), Rash and Swelling     PN: facial swelling, left side. Also had cortisone injection the same day as she started the Augmentin.  PN: facial swelling, left side. Also had cortisone injection the same day as she started the Augmentin.  Noted in downtime recovery of chart.       Penicillins Anaphylaxis     Vancomycin Swelling, Itching and Rash     Other reaction(s): Redness  Flushed face, very itchy; HUT Comment: Flushed face, very itchy; HUT Reaction: Angioedema; HUT Reaction: Redness; HUT Severity: Med; HUT Noted: [id number]  Flushed face, very itchy  Flushed face, very itchy  Flushed face, very itchy       Hydrocodone Nausea and Vomiting and GI Disturbance     vomiting for days  vomiting for days  vomiting for days  vomiting for days, PN: vomiting for days  vomiting for days  vomiting for days  vomiting for days  vomiting for days  vomiting for days  vomiting for days, PN: vomiting for days  vomiting for days  vomiting for days  vomiting for days  vomiting for days  ; HUT Comment: vomiting for days; HUT Reaction: Gastrointestinal; HUT Reaction: Nausea And Vomiting; HUT Reaction Type: Intolerance; HUT Severity: Med; HUT Noted: [id number]  vomiting for days       Blood-Group Specific Substance Other (See Comments)     Patient has an anti-Cw and non-specific antibodies. Blood product orders may be delayed. Draw one red top and two purple top tubes for all type/screen/crossmatch orders.     Influenza Vaccines Other (See Comments)     Oseltamivir Hives     med stopped, PN: med stopped      Cephalosporins Rash     Lamotrigine Rash     Possibly associated with Lamictal.   HUT Comment: Possibly associated with Lamictal. ; HUT Reaction: Rash; HUT Reaction Type: Allergy; HUT Severity: Low; HUT Noted: [id number]     Latex Rash

## 2020-07-12 NOTE — ANESTHESIA CARE TRANSFER NOTE
Patient: Nevin Alvarado    Procedure(s):  ESOPHAGOGASTRODUODENOSCOPY (EGD); Upper Endoscopy WITH FOREIGN BODY REMOVAL    Diagnosis: Foreign body of small intestine [T18.3XXA]  Diagnosis Additional Information: No value filed.    Anesthesia Type:   No value filed.     Note:  Airway :Face Mask  Patient transferred to:PACU  Comments:   -on 6L O2 via FM, Pt Spont. breathing, awake & alert, monitors placed, RN at bedside, no airway      Vitals: (Last set prior to Anesthesia Care Transfer)    CRNA VITALS  7/11/2020 2230 - 7/11/2020 2304      7/11/2020             Pulse:  99    SpO2:  99 %    EKG:  Sinus rhythm                Electronically Signed By: HU Christian CRNA  July 11, 2020  11:04 PM

## 2020-07-12 NOTE — PLAN OF CARE
Nevin denies pain this am.  Ate regular diet and tolerated it well.  UAL in room and wants to go home.  72 hold and attendant discontinued after she was assessed by pyschiatry.  IVAD heplock and needle removed.  Discharge medications and instructions reviewed with pt - no questions.  Pt said she has Protonix at home.

## 2020-07-12 NOTE — PROGRESS NOTES
Gastroenterology Endoscopy Suite Brief Operative Note    Procedure:  Upper endoscopy   Post-operative diagnosis:  Foreign body ingestion   Staff Physician:  Dr. Lyndsey Mendoza   Fellow/Assistant(s):  Dr. Keith Valdez    Specimens:  Please see final procedure note for further details.   Findings:  ~ 4cm metallic object found in gastric body consistent with paper clip.  Removed with rat tooth forceps using a simons.  Small, superficial erosion in the gastric body where clip was located. Benign appearing esophageal stricture.    Complications:  None.   Condition:  Stable   Recommendations  Diet:  Clear liquid diet  PPI:  PPI po once daily  Anti-coagulants/platelets:  N/A  Octreotide:  N/A  Discharge Planning:   After evaluated by psychiatry

## 2020-07-12 NOTE — PROGRESS NOTES
Emergency Social Work Services Note    Date of  Intervention: 07/11/20  Last Emergency Department Visit:  3/29/2020    Care Plan:   Yes, please see [facility] Plan: Extensive care plan related to history of self-injurious behavior including ingestion of foreign bodies and significant mental health hx. please see problem list for full ED treatment plan.    Collaborated with:  ED MD,Patient and bedside RN    Data:  Pt is a 30 yo single female with history of depression, PTSD and borderline personality disorder. Pt presented to the ED, via EMS, for evaluation after swallowing a paperclip. Pt with long hx of swallowing foreign objects. Pt reports that today she was trying to take a nap and became anxious and swallowed the paperclip to decrease her anxiety. Pt cannot recall what made her anxious and reports she was not suicidal. Pt may have been triggered by change in plan to be at her mother's home this weekend, but mother changed plans and pt was not able to go there this weekend, as she normally does. Pt continues to deny suicidal ideation to writer.     Reviewed chart and noted Emergency Guardian listed on pt's chart that was started on 1/16/2020 and supposed to end on 4/15/2020. Met w/ pt to inquire about guardianship status. Pt reports that guardianship was closed. Pt was able to emailed writer the judicial order. Writer has printed this off and will include in pt's hard chart. Writer has also emailed the document to [employer] whom will  update the medical record.     Pt had not ingested a foreign body since 3/29/2020. Pt reports she had been doing well at home and was attending weekly group and individual therapy on Wednesdays. Pt last met w/ her psychiatrist on June 24th and next appt is in September. Pt is scheduled to speak to her CM, w/ Pilar, on Monday. Pt gave verbal permission for writer to leave  for CM notifying that pt is being admitted and reason why she is in the hospital.      Current Community Supports:    Psychiatrist at [facility], Brionna Pierre MD     [facility] The patient is also participating in DBT with [facility].   : Solange Quezada ([phone])     Armaan Ibarraor: independent living facility that is staffed 24/7  ([phone])      Intervention:  Clarification on Guardianship status    Assessment:  Pt is pleasant and engaged with writer. Pt reports that swallowing the paperwork was not a suicide attempt, but more to relieve a fleeting feeling of anxiety. Pt cannot recall what made her anxious, but does report that this weekend she was supposed to be at her mother's home and plans changed where she was unable to go.      Plan:    Anticipated Disposition:  Anticipated that pt will have a formal mental health evlauation in the ED     Barriers to d/c plan:  Medical Readiness     Follow Up:  SW to remain available to assist as needed.

## 2020-07-13 ENCOUNTER — DOCUMENTATION ONLY (OUTPATIENT)
Dept: OTHER | Facility: CLINIC | Age: 30
End: 2020-07-13

## 2020-07-13 LAB
SARS-COV-2 RNA SPEC QL NAA+PROBE: NOT DETECTED
SPECIMEN SOURCE: NORMAL

## 2020-07-22 ENCOUNTER — ANESTHESIA - HEALTHEAST (OUTPATIENT)
Dept: SURGERY | Facility: CLINIC | Age: 30
End: 2020-07-22

## 2020-07-22 ENCOUNTER — SURGERY - HEALTHEAST (OUTPATIENT)
Dept: SURGERY | Facility: CLINIC | Age: 30
End: 2020-07-22

## 2020-07-28 ENCOUNTER — APPOINTMENT (OUTPATIENT)
Dept: GENERAL RADIOLOGY | Facility: CLINIC | Age: 30
End: 2020-07-28
Attending: EMERGENCY MEDICINE
Payer: COMMERCIAL

## 2020-07-28 ENCOUNTER — COMMUNICATION - HEALTHEAST (OUTPATIENT)
Dept: SCHEDULING | Facility: CLINIC | Age: 30
End: 2020-07-28

## 2020-07-28 ENCOUNTER — HOSPITAL ENCOUNTER (OUTPATIENT)
Facility: CLINIC | Age: 30
Setting detail: OBSERVATION
Discharge: HOME | End: 2020-07-30
Attending: EMERGENCY MEDICINE | Admitting: INTERNAL MEDICINE
Payer: COMMERCIAL

## 2020-07-28 DIAGNOSIS — T18.108A FOREIGN BODY IN ESOPHAGUS, INITIAL ENCOUNTER: ICD-10-CM

## 2020-07-28 DIAGNOSIS — T18.198A OTHER FOREIGN OBJECT IN ESOPHAGUS CAUSING OTHER INJURY, INITIAL ENCOUNTER: ICD-10-CM

## 2020-07-28 LAB
AMPHETAMINES UR QL SCN: NEGATIVE
ANION GAP SERPL CALCULATED.3IONS-SCNC: 6 MMOL/L (ref 3–14)
APTT PPP: 46 SEC (ref 22–37)
BARBITURATES UR QL: NEGATIVE
BASOPHILS # BLD AUTO: 0 10E9/L (ref 0–0.2)
BASOPHILS NFR BLD AUTO: 0.4 %
BENZODIAZ UR QL: POSITIVE
BUN SERPL-MCNC: 8 MG/DL (ref 7–30)
CALCIUM SERPL-MCNC: 8.7 MG/DL (ref 8.5–10.1)
CANNABINOIDS UR QL SCN: NEGATIVE
CHLORIDE SERPL-SCNC: 111 MMOL/L (ref 94–109)
CO2 SERPL-SCNC: 23 MMOL/L (ref 20–32)
COCAINE UR QL: NEGATIVE
CREAT SERPL-MCNC: 0.76 MG/DL (ref 0.52–1.04)
DIFFERENTIAL METHOD BLD: NORMAL
EOSINOPHIL # BLD AUTO: 0.1 10E9/L (ref 0–0.7)
EOSINOPHIL NFR BLD AUTO: 1 %
ERYTHROCYTE [DISTWIDTH] IN BLOOD BY AUTOMATED COUNT: 14.6 % (ref 10–15)
ETHANOL SERPL-MCNC: <0.01 G/DL
ETHANOL UR QL SCN: NEGATIVE
GFR SERPL CREATININE-BSD FRML MDRD: >90 ML/MIN/{1.73_M2}
GLUCOSE SERPL-MCNC: 98 MG/DL (ref 70–99)
HCG UR QL: NEGATIVE
HCT VFR BLD AUTO: 37.1 % (ref 35–47)
HGB BLD-MCNC: 11.9 G/DL (ref 11.7–15.7)
IMM GRANULOCYTES # BLD: 0 10E9/L (ref 0–0.4)
IMM GRANULOCYTES NFR BLD: 0.4 %
INR PPP: 1.11 (ref 0.86–1.14)
LYMPHOCYTES # BLD AUTO: 1.9 10E9/L (ref 0.8–5.3)
LYMPHOCYTES NFR BLD AUTO: 17.8 %
MCH RBC QN AUTO: 27.7 PG (ref 26.5–33)
MCHC RBC AUTO-ENTMCNC: 32.1 G/DL (ref 31.5–36.5)
MCV RBC AUTO: 87 FL (ref 78–100)
MONOCYTES # BLD AUTO: 0.6 10E9/L (ref 0–1.3)
MONOCYTES NFR BLD AUTO: 5.7 %
NEUTROPHILS # BLD AUTO: 8 10E9/L (ref 1.6–8.3)
NEUTROPHILS NFR BLD AUTO: 74.7 %
NRBC # BLD AUTO: 0 10*3/UL
NRBC BLD AUTO-RTO: 0 /100
OPIATES UR QL SCN: NEGATIVE
PLATELET # BLD AUTO: 276 10E9/L (ref 150–450)
POTASSIUM SERPL-SCNC: 3.5 MMOL/L (ref 3.4–5.3)
RADIOLOGIST FLAGS: ABNORMAL
RBC # BLD AUTO: 4.29 10E12/L (ref 3.8–5.2)
SODIUM SERPL-SCNC: 140 MMOL/L (ref 133–144)
WBC # BLD AUTO: 10.7 10E9/L (ref 4–11)

## 2020-07-28 PROCEDURE — 99285 EMERGENCY DEPT VISIT HI MDM: CPT | Mod: 25 | Performed by: EMERGENCY MEDICINE

## 2020-07-28 PROCEDURE — 99219 ZZC INITIAL OBSERVATION CARE,LEVL II: CPT | Performed by: INTERNAL MEDICINE

## 2020-07-28 PROCEDURE — 99285 EMERGENCY DEPT VISIT HI MDM: CPT | Mod: Z6 | Performed by: EMERGENCY MEDICINE

## 2020-07-28 PROCEDURE — 25800030 ZZH RX IP 258 OP 636: Performed by: EMERGENCY MEDICINE

## 2020-07-28 PROCEDURE — 85025 COMPLETE CBC W/AUTO DIFF WBC: CPT | Performed by: EMERGENCY MEDICINE

## 2020-07-28 PROCEDURE — 99207 ZZC CDG-CODE CATEGORY CHANGED: CPT | Performed by: INTERNAL MEDICINE

## 2020-07-28 PROCEDURE — 80307 DRUG TEST PRSMV CHEM ANLYZR: CPT | Performed by: EMERGENCY MEDICINE

## 2020-07-28 PROCEDURE — 80320 DRUG SCREEN QUANTALCOHOLS: CPT | Performed by: EMERGENCY MEDICINE

## 2020-07-28 PROCEDURE — 74019 RADEX ABDOMEN 2 VIEWS: CPT

## 2020-07-28 PROCEDURE — 99220 ZZC INITIAL OBSERVATION CARE,LEVL III: CPT | Performed by: NURSE PRACTITIONER

## 2020-07-28 PROCEDURE — 25000128 H RX IP 250 OP 636: Performed by: EMERGENCY MEDICINE

## 2020-07-28 PROCEDURE — C9113 INJ PANTOPRAZOLE SODIUM, VIA: HCPCS | Performed by: EMERGENCY MEDICINE

## 2020-07-28 PROCEDURE — 96361 HYDRATE IV INFUSION ADD-ON: CPT | Performed by: EMERGENCY MEDICINE

## 2020-07-28 PROCEDURE — U0003 INFECTIOUS AGENT DETECTION BY NUCLEIC ACID (DNA OR RNA); SEVERE ACUTE RESPIRATORY SYNDROME CORONAVIRUS 2 (SARS-COV-2) (CORONAVIRUS DISEASE [COVID-19]), AMPLIFIED PROBE TECHNIQUE, MAKING USE OF HIGH THROUGHPUT TECHNOLOGIES AS DESCRIBED BY CMS-2020-01-R: HCPCS | Performed by: EMERGENCY MEDICINE

## 2020-07-28 PROCEDURE — 85730 THROMBOPLASTIN TIME PARTIAL: CPT | Performed by: EMERGENCY MEDICINE

## 2020-07-28 PROCEDURE — 80048 BASIC METABOLIC PNL TOTAL CA: CPT | Performed by: EMERGENCY MEDICINE

## 2020-07-28 PROCEDURE — G0378 HOSPITAL OBSERVATION PER HR: HCPCS

## 2020-07-28 PROCEDURE — 96372 THER/PROPH/DIAG INJ SC/IM: CPT | Mod: XS | Performed by: EMERGENCY MEDICINE

## 2020-07-28 PROCEDURE — 81025 URINE PREGNANCY TEST: CPT | Performed by: EMERGENCY MEDICINE

## 2020-07-28 PROCEDURE — 25000128 H RX IP 250 OP 636: Performed by: NURSE PRACTITIONER

## 2020-07-28 PROCEDURE — 96374 THER/PROPH/DIAG INJ IV PUSH: CPT | Performed by: EMERGENCY MEDICINE

## 2020-07-28 PROCEDURE — 85610 PROTHROMBIN TIME: CPT | Performed by: EMERGENCY MEDICINE

## 2020-07-28 RX ORDER — SODIUM CHLORIDE 9 MG/ML
INJECTION, SOLUTION INTRAVENOUS CONTINUOUS
Status: DISCONTINUED | OUTPATIENT
Start: 2020-07-28 | End: 2020-07-29

## 2020-07-28 RX ORDER — KETOROLAC TROMETHAMINE 30 MG/ML
30 INJECTION, SOLUTION INTRAMUSCULAR; INTRAVENOUS ONCE
Status: COMPLETED | OUTPATIENT
Start: 2020-07-28 | End: 2020-07-28

## 2020-07-28 RX ORDER — HYDROMORPHONE HCL/0.9% NACL/PF 0.2MG/0.2
0.2 SYRINGE (ML) INTRAVENOUS
Status: DISCONTINUED | OUTPATIENT
Start: 2020-07-28 | End: 2020-07-30 | Stop reason: HOSPADM

## 2020-07-28 RX ADMIN — SODIUM CHLORIDE 1000 ML: 9 INJECTION, SOLUTION INTRAVENOUS at 21:18

## 2020-07-28 RX ADMIN — PANTOPRAZOLE SODIUM 40 MG: 40 INJECTION, POWDER, FOR SOLUTION INTRAVENOUS at 21:19

## 2020-07-28 RX ADMIN — KETOROLAC TROMETHAMINE 30 MG: 30 INJECTION, SOLUTION INTRAMUSCULAR at 19:48

## 2020-07-28 RX ADMIN — Medication 0.2 MG: at 22:29

## 2020-07-28 ASSESSMENT — ENCOUNTER SYMPTOMS
VOMITING: 0
ABDOMINAL PAIN: 1
NAUSEA: 0

## 2020-07-28 ASSESSMENT — MIFFLIN-ST. JEOR: SCORE: 1839.92

## 2020-07-28 NOTE — ED TRIAGE NOTES
Patient BIBA after swallowing a pen spring this afternoon at 1730. She states she has felt increasingly stressed lately and had the intention to harm herself. C/o mild upper abdominal pain.

## 2020-07-28 NOTE — ED NOTES
Bed: ED09  Expected date:   Expected time:   Means of arrival:   Comments:  [facility]  29 M  Swallowed pen spring in attempt to harm self

## 2020-07-29 ENCOUNTER — ANESTHESIA (OUTPATIENT)
Dept: SURGERY | Facility: CLINIC | Age: 30
End: 2020-07-29
Payer: COMMERCIAL

## 2020-07-29 ENCOUNTER — ANESTHESIA EVENT (OUTPATIENT)
Dept: SURGERY | Facility: CLINIC | Age: 30
End: 2020-07-29
Payer: COMMERCIAL

## 2020-07-29 ENCOUNTER — APPOINTMENT (OUTPATIENT)
Dept: GENERAL RADIOLOGY | Facility: CLINIC | Age: 30
End: 2020-07-29
Attending: HOSPITALIST
Payer: COMMERCIAL

## 2020-07-29 LAB
GLUCOSE BLDC GLUCOMTR-MCNC: 128 MG/DL (ref 70–99)
GLUCOSE BLDC GLUCOMTR-MCNC: 181 MG/DL (ref 70–99)
GLUCOSE BLDC GLUCOMTR-MCNC: 89 MG/DL (ref 70–99)
GLUCOSE BLDC GLUCOMTR-MCNC: 92 MG/DL (ref 70–99)
HCG SERPL QL: NEGATIVE

## 2020-07-29 PROCEDURE — 99217 ZZC OBSERVATION CARE DISCHARGE: CPT | Performed by: INTERNAL MEDICINE

## 2020-07-29 PROCEDURE — 25000128 H RX IP 250 OP 636: Performed by: NURSE ANESTHETIST, CERTIFIED REGISTERED

## 2020-07-29 PROCEDURE — 74018 RADEX ABDOMEN 1 VIEW: CPT

## 2020-07-29 PROCEDURE — 99214 OFFICE O/P EST MOD 30 MIN: CPT | Performed by: NURSE PRACTITIONER

## 2020-07-29 PROCEDURE — 36000055 ZZH SURGERY LEVEL 2 W FLUORO 1ST 30 MIN - UMMC: Performed by: INTERNAL MEDICINE

## 2020-07-29 PROCEDURE — 84703 CHORIONIC GONADOTROPIN ASSAY: CPT | Performed by: ANESTHESIOLOGY

## 2020-07-29 PROCEDURE — 40000170 ZZH STATISTIC PRE-PROCEDURE ASSESSMENT II: Performed by: INTERNAL MEDICINE

## 2020-07-29 PROCEDURE — 25000125 ZZHC RX 250: Performed by: NURSE ANESTHETIST, CERTIFIED REGISTERED

## 2020-07-29 PROCEDURE — 25000128 H RX IP 250 OP 636: Performed by: NURSE PRACTITIONER

## 2020-07-29 PROCEDURE — 37000009 ZZH ANESTHESIA TECHNICAL FEE, EACH ADDTL 15 MIN: Performed by: INTERNAL MEDICINE

## 2020-07-29 PROCEDURE — 25000565 ZZH ISOFLURANE, EA 15 MIN: Performed by: INTERNAL MEDICINE

## 2020-07-29 PROCEDURE — 36000053 ZZH SURGERY LEVEL 2 EA 15 ADDTL MIN - UMMC: Performed by: INTERNAL MEDICINE

## 2020-07-29 PROCEDURE — 25800030 ZZH RX IP 258 OP 636: Performed by: NURSE ANESTHETIST, CERTIFIED REGISTERED

## 2020-07-29 PROCEDURE — 96376 TX/PRO/DX INJ SAME DRUG ADON: CPT

## 2020-07-29 PROCEDURE — G0378 HOSPITAL OBSERVATION PER HR: HCPCS

## 2020-07-29 PROCEDURE — 25000128 H RX IP 250 OP 636: Performed by: ANESTHESIOLOGY

## 2020-07-29 PROCEDURE — 25000125 ZZHC RX 250: Performed by: INTERNAL MEDICINE

## 2020-07-29 PROCEDURE — 27210794 ZZH OR GENERAL SUPPLY STERILE: Performed by: INTERNAL MEDICINE

## 2020-07-29 PROCEDURE — 00000146 ZZHCL STATISTIC GLUCOSE BY METER IP

## 2020-07-29 PROCEDURE — 96376 TX/PRO/DX INJ SAME DRUG ADON: CPT | Performed by: EMERGENCY MEDICINE

## 2020-07-29 PROCEDURE — 25000132 ZZH RX MED GY IP 250 OP 250 PS 637: Performed by: ANESTHESIOLOGY

## 2020-07-29 PROCEDURE — 25000132 ZZH RX MED GY IP 250 OP 250 PS 637: Performed by: INTERNAL MEDICINE

## 2020-07-29 PROCEDURE — 37000008 ZZH ANESTHESIA TECHNICAL FEE, 1ST 30 MIN: Performed by: INTERNAL MEDICINE

## 2020-07-29 PROCEDURE — 71000016 ZZH RECOVERY PHASE 1 LEVEL 3 FIRST HR: Performed by: INTERNAL MEDICINE

## 2020-07-29 PROCEDURE — 25000132 ZZH RX MED GY IP 250 OP 250 PS 637: Performed by: NURSE PRACTITIONER

## 2020-07-29 RX ORDER — PROPOFOL 10 MG/ML
INJECTION, EMULSION INTRAVENOUS PRN
Status: DISCONTINUED | OUTPATIENT
Start: 2020-07-29 | End: 2020-07-29

## 2020-07-29 RX ORDER — NEOSTIGMINE METHYLSULFATE 1 MG/ML
VIAL (ML) INJECTION PRN
Status: DISCONTINUED | OUTPATIENT
Start: 2020-07-29 | End: 2020-07-29

## 2020-07-29 RX ORDER — LIDOCAINE 40 MG/G
CREAM TOPICAL
Status: DISCONTINUED | OUTPATIENT
Start: 2020-07-29 | End: 2020-07-30 | Stop reason: HOSPADM

## 2020-07-29 RX ORDER — SODIUM CHLORIDE, SODIUM LACTATE, POTASSIUM CHLORIDE, CALCIUM CHLORIDE 600; 310; 30; 20 MG/100ML; MG/100ML; MG/100ML; MG/100ML
INJECTION, SOLUTION INTRAVENOUS CONTINUOUS
Status: DISCONTINUED | OUTPATIENT
Start: 2020-07-29 | End: 2020-07-29 | Stop reason: HOSPADM

## 2020-07-29 RX ORDER — FENTANYL CITRATE 50 UG/ML
25-50 INJECTION, SOLUTION INTRAMUSCULAR; INTRAVENOUS
Status: DISCONTINUED | OUTPATIENT
Start: 2020-07-29 | End: 2020-07-29 | Stop reason: HOSPADM

## 2020-07-29 RX ORDER — DIPHENHYDRAMINE HCL 25 MG
25 CAPSULE ORAL
Status: COMPLETED | OUTPATIENT
Start: 2020-07-29 | End: 2020-07-29

## 2020-07-29 RX ORDER — NALOXONE HYDROCHLORIDE 0.4 MG/ML
.1-.4 INJECTION, SOLUTION INTRAMUSCULAR; INTRAVENOUS; SUBCUTANEOUS
Status: DISCONTINUED | OUTPATIENT
Start: 2020-07-29 | End: 2020-07-30 | Stop reason: HOSPADM

## 2020-07-29 RX ORDER — DESVENLAFAXINE 50 MG/1
100 TABLET, FILM COATED, EXTENDED RELEASE ORAL DAILY
Status: DISCONTINUED | OUTPATIENT
Start: 2020-07-29 | End: 2020-07-30 | Stop reason: HOSPADM

## 2020-07-29 RX ORDER — HEPARIN SODIUM,PORCINE 10 UNIT/ML
5-10 VIAL (ML) INTRAVENOUS EVERY 24 HOURS
Status: DISCONTINUED | OUTPATIENT
Start: 2020-07-29 | End: 2020-07-30 | Stop reason: HOSPADM

## 2020-07-29 RX ORDER — HYDROXYCHLOROQUINE SULFATE 200 MG/1
200 TABLET, FILM COATED ORAL 2 TIMES DAILY
Status: DISCONTINUED | OUTPATIENT
Start: 2020-07-29 | End: 2020-07-30 | Stop reason: HOSPADM

## 2020-07-29 RX ORDER — HYDROMORPHONE HYDROCHLORIDE 1 MG/ML
.3-.5 INJECTION, SOLUTION INTRAMUSCULAR; INTRAVENOUS; SUBCUTANEOUS EVERY 5 MIN PRN
Status: DISCONTINUED | OUTPATIENT
Start: 2020-07-29 | End: 2020-07-29 | Stop reason: HOSPADM

## 2020-07-29 RX ORDER — PREGABALIN 50 MG/1
50 CAPSULE ORAL 3 TIMES DAILY
Status: DISCONTINUED | OUTPATIENT
Start: 2020-07-29 | End: 2020-07-30 | Stop reason: HOSPADM

## 2020-07-29 RX ORDER — DEXAMETHASONE SODIUM PHOSPHATE 4 MG/ML
INJECTION, SOLUTION INTRA-ARTICULAR; INTRALESIONAL; INTRAMUSCULAR; INTRAVENOUS; SOFT TISSUE PRN
Status: DISCONTINUED | OUTPATIENT
Start: 2020-07-29 | End: 2020-07-29

## 2020-07-29 RX ORDER — VALACYCLOVIR HYDROCHLORIDE 500 MG/1
2000 TABLET, FILM COATED ORAL EVERY 12 HOURS SCHEDULED
Status: COMPLETED | OUTPATIENT
Start: 2020-07-29 | End: 2020-07-30

## 2020-07-29 RX ORDER — LIDOCAINE 40 MG/G
CREAM TOPICAL
Status: DISCONTINUED | OUTPATIENT
Start: 2020-07-29 | End: 2020-07-29 | Stop reason: HOSPADM

## 2020-07-29 RX ORDER — HEPARIN SODIUM (PORCINE) LOCK FLUSH IV SOLN 100 UNIT/ML 100 UNIT/ML
5 SOLUTION INTRAVENOUS
Status: DISCONTINUED | OUTPATIENT
Start: 2020-07-29 | End: 2020-07-30 | Stop reason: HOSPADM

## 2020-07-29 RX ORDER — ONDANSETRON 4 MG/1
4 TABLET, ORALLY DISINTEGRATING ORAL EVERY 6 HOURS PRN
Status: DISCONTINUED | OUTPATIENT
Start: 2020-07-29 | End: 2020-07-30 | Stop reason: HOSPADM

## 2020-07-29 RX ORDER — GLYCOPYRROLATE 0.2 MG/ML
INJECTION, SOLUTION INTRAMUSCULAR; INTRAVENOUS PRN
Status: DISCONTINUED | OUTPATIENT
Start: 2020-07-29 | End: 2020-07-29

## 2020-07-29 RX ORDER — TOPIRAMATE 25 MG/1
100 TABLET, FILM COATED ORAL AT BEDTIME
Status: DISCONTINUED | OUTPATIENT
Start: 2020-07-29 | End: 2020-07-29

## 2020-07-29 RX ORDER — NALBUPHINE HYDROCHLORIDE 10 MG/ML
10 INJECTION, SOLUTION INTRAMUSCULAR; INTRAVENOUS; SUBCUTANEOUS
Status: DISCONTINUED | OUTPATIENT
Start: 2020-07-29 | End: 2020-07-29 | Stop reason: HOSPADM

## 2020-07-29 RX ORDER — TOPIRAMATE 25 MG/1
50 TABLET, FILM COATED ORAL AT BEDTIME
Status: DISCONTINUED | OUTPATIENT
Start: 2020-07-29 | End: 2020-07-30 | Stop reason: HOSPADM

## 2020-07-29 RX ORDER — ONDANSETRON 2 MG/ML
4 INJECTION INTRAMUSCULAR; INTRAVENOUS EVERY 6 HOURS PRN
Status: DISCONTINUED | OUTPATIENT
Start: 2020-07-29 | End: 2020-07-30 | Stop reason: HOSPADM

## 2020-07-29 RX ORDER — HEPARIN SODIUM,PORCINE 10 UNIT/ML
5-10 VIAL (ML) INTRAVENOUS
Status: DISCONTINUED | OUTPATIENT
Start: 2020-07-29 | End: 2020-07-30 | Stop reason: HOSPADM

## 2020-07-29 RX ORDER — LIDOCAINE 40 MG/G
CREAM TOPICAL
Status: DISCONTINUED | OUTPATIENT
Start: 2020-07-29 | End: 2020-07-29

## 2020-07-29 RX ORDER — CLONIDINE HYDROCHLORIDE 0.1 MG/1
0.1 TABLET ORAL 2 TIMES DAILY
Status: DISCONTINUED | OUTPATIENT
Start: 2020-07-29 | End: 2020-07-30 | Stop reason: HOSPADM

## 2020-07-29 RX ORDER — ACETAMINOPHEN 650 MG/1
650 SUPPOSITORY RECTAL EVERY 4 HOURS PRN
Status: DISCONTINUED | OUTPATIENT
Start: 2020-07-29 | End: 2020-07-30 | Stop reason: HOSPADM

## 2020-07-29 RX ORDER — ACETAMINOPHEN 325 MG/1
650 TABLET ORAL EVERY 4 HOURS PRN
Status: DISCONTINUED | OUTPATIENT
Start: 2020-07-29 | End: 2020-07-30 | Stop reason: HOSPADM

## 2020-07-29 RX ORDER — BUSPIRONE HYDROCHLORIDE 10 MG/1
10 TABLET ORAL 2 TIMES DAILY
Status: DISCONTINUED | OUTPATIENT
Start: 2020-07-29 | End: 2020-07-30 | Stop reason: HOSPADM

## 2020-07-29 RX ORDER — FENTANYL CITRATE 50 UG/ML
INJECTION, SOLUTION INTRAMUSCULAR; INTRAVENOUS PRN
Status: DISCONTINUED | OUTPATIENT
Start: 2020-07-29 | End: 2020-07-29

## 2020-07-29 RX ORDER — ALBUTEROL SULFATE 90 UG/1
2 AEROSOL, METERED RESPIRATORY (INHALATION) EVERY 4 HOURS PRN
Status: DISCONTINUED | OUTPATIENT
Start: 2020-07-29 | End: 2020-07-30 | Stop reason: HOSPADM

## 2020-07-29 RX ORDER — METFORMIN HCL 500 MG
1000 TABLET, EXTENDED RELEASE 24 HR ORAL
Status: DISCONTINUED | OUTPATIENT
Start: 2020-07-29 | End: 2020-07-30 | Stop reason: HOSPADM

## 2020-07-29 RX ORDER — SODIUM CHLORIDE, SODIUM LACTATE, POTASSIUM CHLORIDE, CALCIUM CHLORIDE 600; 310; 30; 20 MG/100ML; MG/100ML; MG/100ML; MG/100ML
INJECTION, SOLUTION INTRAVENOUS CONTINUOUS PRN
Status: DISCONTINUED | OUTPATIENT
Start: 2020-07-29 | End: 2020-07-29

## 2020-07-29 RX ADMIN — FENTANYL CITRATE 25 MCG: 50 INJECTION, SOLUTION INTRAMUSCULAR; INTRAVENOUS at 13:23

## 2020-07-29 RX ADMIN — DEXAMETHASONE SODIUM PHOSPHATE 8 MG: 4 INJECTION, SOLUTION INTRA-ARTICULAR; INTRALESIONAL; INTRAMUSCULAR; INTRAVENOUS; SOFT TISSUE at 11:51

## 2020-07-29 RX ADMIN — BENZOCAINE AND MENTHOL 1 LOZENGE: 15; 3.6 LOZENGE ORAL at 13:59

## 2020-07-29 RX ADMIN — SODIUM CHLORIDE, POTASSIUM CHLORIDE, SODIUM LACTATE AND CALCIUM CHLORIDE: 600; 310; 30; 20 INJECTION, SOLUTION INTRAVENOUS at 11:38

## 2020-07-29 RX ADMIN — NALBUPHINE HYDROCHLORIDE 10 MG: 10 INJECTION, SOLUTION INTRAMUSCULAR; INTRAVENOUS; SUBCUTANEOUS at 14:00

## 2020-07-29 RX ADMIN — Medication 0.2 MG: at 01:57

## 2020-07-29 RX ADMIN — ROCURONIUM BROMIDE 20 MG: 10 INJECTION INTRAVENOUS at 12:02

## 2020-07-29 RX ADMIN — FENTANYL CITRATE 100 MCG: 50 INJECTION, SOLUTION INTRAMUSCULAR; INTRAVENOUS at 11:47

## 2020-07-29 RX ADMIN — CLONIDINE HYDROCHLORIDE 0.1 MG: 0.1 TABLET ORAL at 20:28

## 2020-07-29 RX ADMIN — BUSPIRONE HYDROCHLORIDE 10 MG: 10 TABLET ORAL at 20:29

## 2020-07-29 RX ADMIN — FENTANYL CITRATE 25 MCG: 50 INJECTION, SOLUTION INTRAMUSCULAR; INTRAVENOUS at 13:08

## 2020-07-29 RX ADMIN — Medication 0.2 MG: at 09:02

## 2020-07-29 RX ADMIN — FENTANYL CITRATE 50 MCG: 50 INJECTION, SOLUTION INTRAMUSCULAR; INTRAVENOUS at 12:43

## 2020-07-29 RX ADMIN — HYDROXYCHLOROQUINE SULFATE 200 MG: 200 TABLET, FILM COATED ORAL at 20:27

## 2020-07-29 RX ADMIN — MIDAZOLAM 2 MG: 1 INJECTION INTRAMUSCULAR; INTRAVENOUS at 11:38

## 2020-07-29 RX ADMIN — VALACYCLOVIR HYDROCHLORIDE 2000 MG: 500 TABLET, FILM COATED ORAL at 20:28

## 2020-07-29 RX ADMIN — LURASIDONE HYDROCHLORIDE 60 MG: 20 TABLET, FILM COATED ORAL at 20:30

## 2020-07-29 RX ADMIN — GLYCOPYRROLATE 1 MG: 0.2 INJECTION, SOLUTION INTRAMUSCULAR; INTRAVENOUS at 12:33

## 2020-07-29 RX ADMIN — FENTANYL CITRATE 50 MCG: 50 INJECTION, SOLUTION INTRAMUSCULAR; INTRAVENOUS at 13:34

## 2020-07-29 RX ADMIN — BENZOCAINE AND MENTHOL 1 LOZENGE: 15; 3.6 LOZENGE ORAL at 20:28

## 2020-07-29 RX ADMIN — PREGABALIN 50 MG: 50 CAPSULE ORAL at 20:29

## 2020-07-29 RX ADMIN — Medication 100 MG: at 11:52

## 2020-07-29 RX ADMIN — NEOSTIGMINE METHYLSULFATE 5 MG: 1 INJECTION, SOLUTION INTRAVENOUS at 12:33

## 2020-07-29 RX ADMIN — HYDROMORPHONE HYDROCHLORIDE 0.5 MG: 1 INJECTION, SOLUTION INTRAMUSCULAR; INTRAVENOUS; SUBCUTANEOUS at 13:58

## 2020-07-29 RX ADMIN — METFORMIN HYDROCHLORIDE 1000 MG: 500 TABLET, EXTENDED RELEASE ORAL at 16:39

## 2020-07-29 RX ADMIN — DIPHENHYDRAMINE HYDROCHLORIDE 25 MG: 25 CAPSULE ORAL at 13:31

## 2020-07-29 RX ADMIN — TOPIRAMATE 50 MG: 25 TABLET, FILM COATED ORAL at 20:30

## 2020-07-29 RX ADMIN — FENTANYL CITRATE 50 MCG: 50 INJECTION, SOLUTION INTRAMUSCULAR; INTRAVENOUS at 12:48

## 2020-07-29 RX ADMIN — Medication 0.2 MG: at 05:36

## 2020-07-29 RX ADMIN — PROPOFOL 100 MG: 10 INJECTION, EMULSION INTRAVENOUS at 11:52

## 2020-07-29 RX ADMIN — ONDANSETRON 4 MG: 2 INJECTION INTRAMUSCULAR; INTRAVENOUS at 11:17

## 2020-07-29 NOTE — ANESTHESIA POSTPROCEDURE EVALUATION
Anesthesia POST Procedure Evaluation    Patient: Nevin Alvarado   MRN:     [id number] Gender:   female   Age:    29 year old :      1990        Preoperative Diagnosis: History of esophagogastroduodenoscopy [Z98.890]   Procedure(s):  ESOPHAGOGASTRODUODENOSCOPY REMOVAL OF FOREIGN BODY   Postop Comments: No value filed.     Anesthesia Type: General       Disposition: Admission   Postop Pain Control: Uneventful            Sign Out: Well controlled pain   PONV: No   Neuro/Psych: Uneventful            Sign Out: Acceptable/Baseline neuro status   Airway/Respiratory: Uneventful            Sign Out: Acceptable/Baseline resp. status   CV/Hemodynamics: Uneventful            Sign Out: Acceptable CV status   Other NRE: NONE   DID A NON-ROUTINE EVENT OCCUR? No         Last Anesthesia Record Vitals:  CRNA VITALS  2020 1213 - 2020 1313      2020             EKG:  Sinus rhythm          Last PACU Vitals:  Vitals Value Taken Time   /77 2020  2:27 PM   Temp 36.5  C (97.7  F) 2020 12:47 PM   Pulse 73 2020  2:27 PM   Resp 18 2020  1:30 PM   SpO2 97 % 2020  1:54 PM   Temp src     NIBP     Pulse     SpO2     Resp     Temp     Ht Rate     Temp 2     Vitals shown include unvalidated device data.      Electronically Signed By: Carli Estrada MD, 2020, 2:49 PM

## 2020-07-29 NOTE — BRIEF OP NOTE
[facility], [place]    Brief Operative Note    Pre-operative diagnosis: History of esophagogastroduodenoscopy [Z98.890]  Post-operative diagnosis Foreign Body Ingestion    Procedure: Procedure(s):  ESOPHAGOGASTRODUODENOSCOPY REMOVAL OF FOREIGN BODY  Surgeon: Surgeon(s) and Role:     * Samia Stanton MD - Primary  Anesthesia: General   Estimated blood loss: None  Drains: None  Specimens: * No specimens in log *  Findings:   Foreign metal body in the stomach. Removed..  Complications: None.  Implants: * No implants in log *    Recommendations:  - observation as planned  - PPI daily  - OK to advance diet as tolerated    Sonam TINSLEY MD  Gastroenterology Fellow  Division of Gastroenterology, Hepatology and Nutrition  [facility]

## 2020-07-29 NOTE — OR NURSING
Patient mostly concerned with itching. After she received the Nubain she stopped physically scratching but stated she still itched just as bad. We had a discussion and decided that the itching was probably due to the narcotics so Nevin opted not to receive them anymore. She also had no further complaints of pain. She asked for her phone and has been trying to get hold of her  and her therapist. She expressed anger at her therapists message and frustration not being able to get hold of anyone. She received her belongings back in PACU and has them with her on her bed and going through and using them.

## 2020-07-29 NOTE — H&P
[facility]    History and Physical - Hospitalist Service, [facility]       Date of Admission:  7/28/2020    Assessment & Plan   Nevin Alavrado is a 29 year old woman admitted on 7/11/2020. She has a history of pulmonary embolus, BPD, ADD, anorexia, PTSD and foreign body ingestion and is admitted following a pen spring ingestion.    1) Ingested pen spring- Patient ingested a pen spring today during an episode of anxiety.  She has had multiple admissions for foreign body ingestion and is familiar to our service.  She was doing quite well until earlier this month and has now had a series of admissions for foreign body ingestion over the past two weeks  - Consult GI for EGD  - NPO save for ice chips and meds, LR @ 75 ccs/hr  - Tylenol PRN pain, low dose hydromorphone available as well  - Patient previously had a guardian but this has been ended by the court  - Sitter, swallow precautions  - COVID screen prior to procedure  - Will consult psychiatry per patient's request as she cannot see her outpatient psychiatrist until September     2) History of PTSD, BPD, ADD  - Continue home buspirone, desvenlafaxine, hydroxyzine, lurasidone, pregabalin, topiramate     3) History of pulmonary embolism  - No longer on anticoagulation     4) History of granuloma annulare  - Continue Plaquenil     5) Metabolic syndrome  - Continue metformin        Diet: NPO  DVT Prophylaxis: Pneumatic Compression Devices  Hazel Catheter: not present  Code Status: Full         Disposition Plan   Expected discharge: Tomorrow, recommended to prior living arrangement once foreign body removed.  Entered: HU Davis CNP 07/28/2020, 9:57 PM     The patient's care was discussed with the Attending Physician, Dr. Pena.    HU Davis CNP  [facility]  Pager: [phone]  Please see sticky note for cross cover  information  ______________________________________________________________________    Chief Complaint   Ingested unwound pen spring    History is obtained from the patient    History of Present Illness   Nevin Alvarado is a 29 year old woman admitted on 7/11/2020. She has a history of pulmonary embolus, BPD, ADD, anorexia, PTSD and foreign body ingestion and is admitted following a pen spring ingestion.    The patient reports that she has had a long history of ingestions, usually with stressful episodes.  I admitted her a week and a half ago after she ingested a paperclip, and she has had now 3 ED visits for foreign body ingestion over the past 2 weeks.  Today she unbound a pen spring and ingested it.  She notes some significant pain at the bottom of her throat particularly when swallowing.    She reports she has a good relationship with her outpatient psychiatrist but cannot see her until September and is interested in speaking with someone with psychiatry here.        Review of Systems    The 10 point Review of Systems is negative other than noted in the HPI or here.     Past Medical History    I have reviewed this patient's medical history and updated it with pertinent information if needed.   Past Medical History:   Diagnosis Date     ADD (attention deficit disorder)      Anorexia nervosa with bulimia     history of; on Topamax     Anxiety      Borderline personality disorder (H)      Depression      H/O self-harm      History of pulmonary embolism 12/2019    Provoked. Completed 3 month course of Apixaban     Morbid obesity (H)      Neuropathy      PTSD (post-traumatic stress disorder)      Rectal foreign body - Recurrent issue, self placed      Swallowed foreign body - Recurrent issue, self placed        Past Surgical History   I have reviewed this patient's surgical history and updated it with pertinent information if needed.  Past Surgical History:   Procedure Laterality Date     COMBINED  ESOPHAGOSCOPY, GASTROSCOPY, DUODENOSCOPY (EGD), REPLACE ESOPHAGEAL STENT N/A 10/9/2019    Procedure: Upper Endoscopy with Suture Placement;  Surgeon: Zurdo Ramirez MD;  Location: [facility]     ESOPHAGOSCOPY, GASTROSCOPY, DUODENOSCOPY (EGD), COMBINED N/A 3/9/2017    Procedure: COMBINED ESOPHAGOSCOPY, GASTROSCOPY, DUODENOSCOPY (EGD), REMOVE FOREIGN BODY;  Surgeon: Avis Guzmán MD;  Location: [facility]     ESOPHAGOSCOPY, GASTROSCOPY, DUODENOSCOPY (EGD), COMBINED N/A 4/20/2017    Procedure: COMBINED ESOPHAGOSCOPY, GASTROSCOPY, DUODENOSCOPY (EGD), REMOVE FOREIGN BODY;  EGD removal Foregin body;  Surgeon: Lokesh Paula MD;  Location: [facility]     ESOPHAGOSCOPY, GASTROSCOPY, DUODENOSCOPY (EGD), COMBINED N/A 6/12/2017    Procedure: COMBINED ESOPHAGOSCOPY, GASTROSCOPY, DUODENOSCOPY (EGD);  COMBINED ESOPHAGOSCOPY, GASTROSCOPY, DUODENOSCOPY (EGD) [[id number]]attempted removal of foreign body;  Surgeon: Pamela Perez MD;  [facility]     ESOPHAGOSCOPY, GASTROSCOPY, DUODENOSCOPY (EGD), COMBINED N/A 6/9/2017    Procedure: COMBINED ESOPHAGOSCOPY, GASTROSCOPY, DUODENOSCOPY (EGD), REMOVE FOREIGN BODY;  Esophagoscopy, Gastroscopy, Duodenoscopy, Removal of Foreign Body;  Surgeon: Dejon Marsh MD;  [facility]: [facility]     ESOPHAGOSCOPY, GASTROSCOPY, DUODENOSCOPY (EGD), COMBINED N/A 1/6/2018    Procedure: COMBINED ESOPHAGOSCOPY, GASTROSCOPY, DUODENOSCOPY (EGD), REMOVE FOREIGN BODY;  COMBINED ESOPHAGOSCOPY, GASTROSCOPY, DUODENOSCOPY (EGD) [by pascal net and snare with profol sedation;  Surgeon: Feliciano Emmanuel MD;  Location:  GI     ESOPHAGOSCOPY, GASTROSCOPY, DUODENOSCOPY (EGD), COMBINED N/A 3/19/2018    Procedure: COMBINED ESOPHAGOSCOPY, GASTROSCOPY, DUODENOSCOPY (EGD), REMOVE FOREIGN BODY;   Esophagodscopy, Gastroscopy, Duodenoscopy,Foreign Body Removal;  Surgeon: Brice Guzmán MD;  [facility]     ESOPHAGOSCOPY, GASTROSCOPY, DUODENOSCOPY (EGD), COMBINED N/A 4/16/2018     Procedure: COMBINED ESOPHAGOSCOPY, GASTROSCOPY, DUODENOSCOPY (EGD), REMOVE FOREIGN BODY;  Esophagogastroduodenoscopy  Foreign Body Removal X 2;  Surgeon: Royer Moise MD;  [facility]: [facility]     ESOPHAGOSCOPY, GASTROSCOPY, DUODENOSCOPY (EGD), COMBINED N/A 6/1/2018    Procedure: COMBINED ESOPHAGOSCOPY, GASTROSCOPY, DUODENOSCOPY (EGD), REMOVE FOREIGN BODY;  COMBINED ESOPHAGOSCOPY, GASTROSCOPY, DUODENOSCOPY with removal of foreign body, propofol sedation by anesthesia;  Surgeon: Brice Martinez MD;  Location:  GI     ESOPHAGOSCOPY, GASTROSCOPY, DUODENOSCOPY (EGD), COMBINED N/A 7/25/2018    Procedure: COMBINED ESOPHAGOSCOPY, GASTROSCOPY, DUODENOSCOPY (EGD), REMOVE FOREIGN BODY;;  Surgeon: Candy Castelan MD;  Location:  GI     ESOPHAGOSCOPY, GASTROSCOPY, DUODENOSCOPY (EGD), COMBINED N/A 7/28/2018    Procedure: COMBINED ESOPHAGOSCOPY, GASTROSCOPY, DUODENOSCOPY (EGD), REMOVE FOREIGN BODY;  COMBINED ESOPHAGOSCOPY, GASTROSCOPY, DUODENOSCOPY (EGD), REMOVE FOREIGN BODY;  Surgeon: Brice Guzmán MD;  [facility]     ESOPHAGOSCOPY, GASTROSCOPY, DUODENOSCOPY (EGD), COMBINED N/A 7/31/2018    Procedure: COMBINED ESOPHAGOSCOPY, GASTROSCOPY, DUODENOSCOPY (EGD);  COMBINED ESOPHAGOSCOPY, GASTROSCOPY, DUODENOSCOPY (EGD) TO REMOVE FOREIGN BODY;  Surgeon: Lokesh Paula MD;  [facility]     ESOPHAGOSCOPY, GASTROSCOPY, DUODENOSCOPY (EGD), COMBINED N/A 8/4/2018    Procedure: COMBINED ESOPHAGOSCOPY, GASTROSCOPY, DUODENOSCOPY (EGD), REMOVE FOREIGN BODY;   combined esophagoscopy, gastroscopy, duodenoscopy, REMOVE FOREIGN BODY ;  Surgeon: Lokesh Paula MD;  [facility]     ESOPHAGOSCOPY, GASTROSCOPY, DUODENOSCOPY (EGD), COMBINED N/A 10/6/2019    Procedure: ESOPHAGOGASTRODUODENOSCOPY (EGD) with fireign body removal x2, esophageal stent placement with floroscopy;  Surgeon: Timoteo Espana MD;  [facility]     ESOPHAGOSCOPY, GASTROSCOPY, DUODENOSCOPY (EGD), COMBINED N/A 12/2/2019     Procedure: Esophagogastroduodenoscopy with esophageal stent removal, esophogram;  Surgeon: Kailee Tena MD;  [facility]: [facility]     ESOPHAGOSCOPY, GASTROSCOPY, DUODENOSCOPY (EGD), COMBINED N/A 12/17/2019    Procedure: ESOPHAGOGASTRODUODENOSCOPY, WITH FOREIGN BODY REMOVAL;  Surgeon: Pamela Perez MD;  [facility]     ESOPHAGOSCOPY, GASTROSCOPY, DUODENOSCOPY (EGD), COMBINED N/A 12/13/2019    Procedure: ESOPHAGOGASTRODUODENOSCOPY, WITH FOREIGN BODY REMOVAL;  Surgeon: Samia Stanton MD;  [facility]     ESOPHAGOSCOPY, GASTROSCOPY, DUODENOSCOPY (EGD), COMBINED N/A 12/28/2019    Procedure: ESOPHAGOGASTRODUODENOSCOPY (EGD) Removal of Foreign Body X 2;  Surgeon: Huy Kelley MD;  [facility]     ESOPHAGOSCOPY, GASTROSCOPY, DUODENOSCOPY (EGD), COMBINED N/A 1/5/2020    Procedure: ESOPHAGOGASTRODUOENOSCOPY WITH FOREIGN BODY REMOVAL;  Surgeon: Pamela Perez MD;  [facility]     ESOPHAGOSCOPY, GASTROSCOPY, DUODENOSCOPY (EGD), COMBINED N/A 1/3/2020    Procedure: ESOPHAGOGASTRODUODENOSCOPY (EGD) REMOVAL OF FOREIGN BODY.;  Surgeon: Pamela Perez MD;  [facility]     ESOPHAGOSCOPY, GASTROSCOPY, DUODENOSCOPY (EGD), COMBINED N/A 1/13/2020    Procedure: ESOPHAGOGASTRODUODENOSCOPY (EGD) for foreign body removal;  Surgeon: Lokesh Paula MD;  [facility]     ESOPHAGOSCOPY, GASTROSCOPY, DUODENOSCOPY (EGD), COMBINED N/A 1/18/2020    Procedure: Diagnostic ESOPHAGOGASTRODUODENOSCOPY (EGD;  Surgeon: Lokesh Paula MD;  [facility]     ESOPHAGOSCOPY, GASTROSCOPY, DUODENOSCOPY (EGD), COMBINED N/A 3/29/2020    Procedure: UPPER ENDOSCOPY WITH FOREIGN BODY REMOVAL;  Surgeon: Doug Hansen MD;  [facility]     ESOPHAGOSCOPY, GASTROSCOPY, DUODENOSCOPY (EGD), COMBINED N/A 7/11/2020    Procedure: ESOPHAGOGASTRODUODENOSCOPY (EGD); Upper Endoscopy WITH FOREIGN BODY REMOVAL;  Surgeon: Lyndsey Mendoza DO;  [facility]     EXAM UNDER  ANESTHESIA ANUS N/A 1/10/2017    Procedure: EXAM UNDER ANESTHESIA ANUS;  Surgeon: Annmarie Haynes MD;  [facility]     EXAM UNDER ANESTHESIA RECTUM N/A 7/19/2018    Procedure: EXAM UNDER ANESTHESIA RECTUM;  EXAM UNDER ANESTHESIA, REMOVAL OF RECTAL FOREIGN BODY;  Surgeon: Annmarie Haynes MD;  [facility]     HC REMOVE FECAL IMPACTION OR FB W ANESTHESIA N/A 12/18/2016    Procedure: REMOVE FECAL IMPACTION/FOREIGN BODY UNDER ANESTHESIA;  Surgeon: Soham Cano MD;  Location: [facility]     KNEE SURGERY - removed a small tissue mass from knee Right      LAPAROSCOPIC ABLATION ENDOMETRIOSIS       LAPAROTOMY EXPLORATORY N/A 1/10/2017    Procedure: LAPAROTOMY EXPLORATORY;  Surgeon: Annmarie Haynes MD;  Location: [facility]     LAPAROTOMY EXPLORATORY  09/11/2019    Manual manipulation of bowel to remove pill bottle in rectum     lymph nodes removed from neck; benign  age 7     MAMMOPLASTY REDUCTION Bilateral      RELEASE CARPAL TUNNEL Bilateral      SIGMOIDOSCOPY FLEXIBLE N/A 1/10/2017    Procedure: SIGMOIDOSCOPY FLEXIBLE;  Surgeon: Annmarie Haynes MD;  [facility]: [facility]     SIGMOIDOSCOPY FLEXIBLE N/A 5/8/2018    Procedure: SIGMOIDOSCOPY FLEXIBLE;  flex sig with foreign body removal using snare and rattooth forcep;  Surgeon: Soham Cano MD;  Location:  GI     SIGMOIDOSCOPY FLEXIBLE N/A 2/20/2019    Procedure: Exam under anesthesia Colonoscopy with attempted  removal of rectal foreign body;  Surgeon: Estrada Chávez MD;  Location: [facility]       Social History   I have reviewed this patient's social history and updated it with pertinent information if needed.      Family History   I have reviewed this patient's family history and updated it with pertinent information if needed.  Family History   Problem Relation Age of Onset     Diabetes Type 2  Maternal Grandmother      Diabetes Type 2  Paternal Grandmother      Breast Cancer Paternal Grandmother      Cerebrovascular Disease  Father 53     Myocardial Infarction No family hx of      Coronary Artery Disease Early Onset No family hx of      Ovarian Cancer No family hx of      Colon Cancer No family hx of        Prior to Admission Medications   Prior to Admission Medications   Prescriptions Last Dose Informant Patient Reported? Taking?   Cholecalciferol (VITAMIN D) 50 MCG (2000 UT) CAPS  Self Yes No   Sig: Take 2,000 Units by mouth daily    Prenatal Vit-Fe Fumarate-FA (PNV PRENATAL PLUS MULTIVITAMIN) 27-1 MG TABS per tablet   Yes No   Sig: Take 1 tablet by mouth daily   acetaminophen (TYLENOL) 32 mg/mL liquid   No No   Sig: Take 31.25 mLs (1,000 mg) by mouth every 6 hours as needed for fever or pain   albuterol (PROAIR HFA/PROVENTIL HFA/VENTOLIN HFA) 108 (90 Base) MCG/ACT inhaler   Yes No   Sig: Inhale 2 puffs into the lungs as needed for shortness of breath / dyspnea or wheezing   busPIRone (BUSPAR) 10 MG tablet  Self Yes No   Sig: Take 1 tablet (10 mg) by mouth twice daily   cloNIDine (CATAPRES) 0.1 MG tablet  Self Yes No   Sig: Take 0.1 mg by mouth 2 times daily    desvenlafaxine (PRISTIQ) 100 MG 24 hr tablet  Self Yes No   Sig: Take 1 tablet (100 mg) by mouth every morning   docosanol (ABREVA) 10 % CREA cream   Yes No   Sig: Apply to lip 5 times a day as soon as symptoms begin, do not use for more than 10 days. Used PRN.   hydrOXYzine (ATARAX) 50 MG tablet   No No   Sig: Take 1 tablet (50 mg) by mouth 3 times daily as needed for anxiety   hydroxychloroquine (PLAQUENIL) 200 MG tablet   Yes No   Sig: Take 200 mg by mouth 2 times daily   lurasidone (LATUDA) 60 MG TABS tablet  Self Yes No   Sig: Take 1 tablet (60 mg) by mouth at bedtime   metFORMIN (GLUCOPHAGE-XR) 500 MG 24 hr tablet  Self Yes No   Sig: Take 1,000 mg by mouth Take 1 tablet (500 mg) by mouth daily    norethindrone (MICRONOR) 0.35 MG tablet   Yes No   Sig: Take 0.35 mg by mouth daily   ondansetron (ZOFRAN-ODT) 4 MG ODT tab   Yes No   Sig: Take 4 mg by mouth every 6 hours as  needed for nausea or vomiting    pantoprazole (PROTONIX) 40 MG EC tablet   No No   Sig: Take 1 tablet (40 mg) by mouth daily   pregabalin (LYRICA) 50 MG capsule   Yes No   Sig: Take 50 mg by mouth 3 times daily   topiramate (TOPAMAX) 100 MG tablet  Self Yes No   Sig: Take 50 mg by mouth Take 1 tablet (100 mg) by mouth daily at bedtime       Facility-Administered Medications: None     Allergies   Allergies   Allergen Reactions     Amoxicillin-Pot Clavulanate Other (See Comments), Swelling and Rash     PN: facial swelling, left side. Also had cortisone injection the same day as she started the Augmentin.  Noted in downtime recovery of chart.       Penicillins Anaphylaxis     Vancomycin Itching, Swelling and Rash     Other reaction(s): Redness  Flushed face, very itchy; HUT Comment: Flushed face, very itchy; HUT Reaction: Angioedema; HUT Reaction: Redness; HUT Severity: Med; HUT Noted: [id number]       Hydrocodone Nausea and Vomiting and GI Disturbance     vomiting for days, PN: vomiting for days; HUT Comment: vomiting for days; HUT Reaction: Gastrointestinal; HUT Reaction: Nausea And Vomiting; HUT Reaction Type: Intolerance; HUT Severity: Med; HUT Noted: [id number]  vomiting for days       Blood-Group Specific Substance Other (See Comments)     Patient has an anti-Cw and non-specific antibodies. Blood product orders may be delayed. Draw one red top and two purple top tubes for all type/screen/crossmatch orders.     Influenza Vaccines Other (See Comments)     Oseltamivir Hives     med stopped, PN: med stopped     Cephalosporins Rash     Lamotrigine Rash     Possibly associated with Lamictal.   HUT Comment: Possibly associated with Lamictal. ; HUT Reaction: Rash; HUT Reaction Type: Allergy; HUT Severity: Low; HUT Noted: [id number]     Latex Rash       Physical Exam   Vital Signs: Temp: 99.1  F (37.3  C) Temp src: Oral BP: (!) 143/86 Pulse: 90   Resp: 16 SpO2: 97 % O2 Device: None (Room air)    Weight: 255 lbs 0 oz    Physical  Exam   Constitutional:   Well nourished, well developed, resting comfortably   Head: Normocephalic and atraumatic.   Eyes: Conjunctivae are normal. Pupils are equal, round, and reactive to light.  Pharynx has no erythema or exudate, mucous membranes are moist  Neck:   No adenopathy, no bony tenderness  Cardiovascular: Regular rate and rhythm without murmurs or gallops  Pulmonary/Chest: Clear to auscultation bilaterally, with no wheezes or retractions. No respiratory distress.  GI: Soft with good bowel sounds.  Non-tender, non-distended, with no guarding, no rebound, no peritoneal signs.   Musculoskeletal:  No edema or clubbing   Skin: Skin is warm and dry. No rash noted.   Neurological: Alert and oriented to person, place, and time. Nonfocal exam  Psychiatric:  Normal mood and affect.      Data   Data reviewed today: I reviewed all medications, new labs and imaging results over the last 24 hours. I personally reviewed her labs and imaging from today.

## 2020-07-29 NOTE — CONSULTS
.    [facility], [place]   Initial Psychiatric Consult   Consult date: July 29, 2020         Reason for Consult, requesting source:    FOB ingestion- intentional  Requesting source: Willow Hart        HPI:   Admitted ED 7/28  Per H&P: Nevin Alvarado is a 29 year old female who presents with swallowed foreign body. She is well known to the Emergency Department after swallowing a pen spring. She denies swallowing the ink cartridge. She has been engaging in swallowing things lately due to stressors, depression and anxiety. She has been feeling more depressed the past few weeks than usual and having more thoughts, urges to engage in self-injurious behavior. She swallowed a pen spring. She has an epigastric pain. No nausea or vomiting. She asks if she could have Tylenol or Toradol. She lives at home in her own independent living apartment with onsite staff. She has an ILS worker who helps her get out but otherwise she doesn't drive.      She is well-known to this service, most recently seen by Dr. Randall on 7/12/20. She had a period of doing well with no FOB ingestion or insertion since January up until 3 weeks ago and has had several episodes since. At [facility] over the weekend for swallowing paper clip.  She describes numerous recent stressors.  Ingestion of foreign body yesterday: a spring from inside of a pen. She denies any other FOB, including rectal insertion.  She denies SI- and discusses FOB issues as a kind of addiction, and she has had a relapse.  Patient states that initially when she was in ED she has pain like it was lodged is esophagus. Now appear to be entering stomach.   Has therapist., psych provider, ILS worker, and home care involved.  She states she tried calling her safety contacts but that they did not get back to her in time.   Xray Abd today:   Findings:   >Supine portable abdominal radiographs. The linear radiodensity present  previously overlying the  mediastinum is now demonstrated is the lower  mediastinum extending into the proximal stomach. No additional  radiopaque foreign bodies identified in the field-of-view. The pelvis  has not been included. Nonobstructive bowel gas pattern. Assessment  for free air is limited on supine imaging. Osseous structures stable  with scoliosis noted.  Tip of a central venous catheter in the thorax present. Stable  elevation of the right hemidiaphragm.                                                                  Impression: The ingested radiopaque structure present previously in  the mediastinum has moved distally, now appears to be entering the  stomach. Assessment for perforation/free air limited on supine  abdominal radiographs.<    She is tearful in my interview today describing this as a relapse. Her sister, who lives in apartment next to her is not speaking with her, and there was a recent anniversary of nephews death.  She states she has not be entirely forthcoming in therapy and we discussed the problem of avoidance leading to a cycle of increased anxiety which set the stage for SIB.     Denies any SI or SIB urges now.                    Past Psychiatric History:   :Psych history and hospitalizations: extensive with numerous ED encounters for FOB ingestion. Please see details in care coordination note sn Dr. Randall's recent psych consult (7/12/20)  Current prescriber: Dr Brionna De La Rosa- next appt now rescheduled to Aug 11  Therapist: has outpatient therapy with appointment at 2 pm today. Has ILS worker and . Has home care  History of psychotropics and treatment response/adverse events/adherence: Numerous trial over the years. See allergy list.   Onset of psychiatric symptoms: Teens- anorexia nervosa and cutting. Denies cutting now.   Recent stressors: as noted above. Unable to reach people immediately to help her  History of suicide attempt or self injury: As discussed above with frequent FOB SIB. Remote  history of cutting. Positive but remote  Sleep: adequate  Interest/Energy: fair  Focus/Concentration: fair  Recall: 3/3  Short- and long-term memory on gross exam: intact  Appetite: decreased  Feeding issues:  history of eating disorder. Denies binge purge or restriction at this time' appears to be restrictive eating at this time.   Mood/Mood instability/kenzie: positive due to emotional dysregulation due to PD.  No kenzie  Anxiety/Panic: intermittent high anxiety with low coping ability  Obsessions/compulsions: Intrusive thoughts to swallow FOB- is able to thwart and resist at times, at other times contemplates up to 2 hours before acting on them. Worse at night.   Nightmares, flashbacks, intrusive thoughts: nightmares at times but improved.   Hallucinations: denies  Delusions: denies  Paranoia: denies  Other symptoms of thought disorder: none     TBI/LOC: denies  Delirium screen: negative  History of commitment/court-ordered neuroleptic: MI commitment in 2015.  No longer has emergency guardian                  Substance Use and History:      She denies any problematic use of alcohol or drugs. Does not smoke.              Past Medical History:   PAST MEDICAL HISTORY:   Past Medical History:   Diagnosis Date     ADD (attention deficit disorder)      Anorexia nervosa with bulimia     history of; on Topamax     Anxiety      Borderline personality disorder (H)      Depression      H/O self-harm      History of pulmonary embolism 12/2019    Provoked. Completed 3 month course of Apixaban     Morbid obesity (H)      Neuropathy      PTSD (post-traumatic stress disorder)      Rectal foreign body - Recurrent issue, self placed      Swallowed foreign body - Recurrent issue, self placed        PAST SURGICAL HISTORY:   Past Surgical History:   Procedure Laterality Date     COMBINED ESOPHAGOSCOPY, GASTROSCOPY, DUODENOSCOPY (EGD), REPLACE ESOPHAGEAL STENT N/A 10/9/2019    Procedure: Upper Endoscopy with Suture Placement;  Surgeon:  Zurdo Ramirez MD;  [facility]     ESOPHAGOSCOPY, GASTROSCOPY, DUODENOSCOPY (EGD), COMBINED N/A 3/9/2017    Procedure: COMBINED ESOPHAGOSCOPY, GASTROSCOPY, DUODENOSCOPY (EGD), REMOVE FOREIGN BODY;  Surgeon: Avis Guzmán MD;  [facility]     ESOPHAGOSCOPY, GASTROSCOPY, DUODENOSCOPY (EGD), COMBINED N/A 4/20/2017    Procedure: COMBINED ESOPHAGOSCOPY, GASTROSCOPY, DUODENOSCOPY (EGD), REMOVE FOREIGN BODY;  EGD removal Foregin body;  Surgeon: Lokesh Paula MD;  [facility]     ESOPHAGOSCOPY, GASTROSCOPY, DUODENOSCOPY (EGD), COMBINED N/A 6/12/2017    Procedure: COMBINED ESOPHAGOSCOPY, GASTROSCOPY, DUODENOSCOPY (EGD);  COMBINED ESOPHAGOSCOPY, GASTROSCOPY, DUODENOSCOPY (EGD) [[id number]]attempted removal of foreign body;  Surgeon: Pamela Perez MD;  [facility]     ESOPHAGOSCOPY, GASTROSCOPY, DUODENOSCOPY (EGD), COMBINED N/A 6/9/2017    Procedure: COMBINED ESOPHAGOSCOPY, GASTROSCOPY, DUODENOSCOPY (EGD), REMOVE FOREIGN BODY;  Esophagoscopy, Gastroscopy, Duodenoscopy, Removal of Foreign Body;  Surgeon: Dejon Marsh MD;  [facility]     ESOPHAGOSCOPY, GASTROSCOPY, DUODENOSCOPY (EGD), COMBINED N/A 1/6/2018    Procedure: COMBINED ESOPHAGOSCOPY, GASTROSCOPY, DUODENOSCOPY (EGD), REMOVE FOREIGN BODY;  COMBINED ESOPHAGOSCOPY, GASTROSCOPY, DUODENOSCOPY (EGD) [by pascal net and snare with profol sedation;  Surgeon: Feliciano Emmanuel MD;  Location:  GI     ESOPHAGOSCOPY, GASTROSCOPY, DUODENOSCOPY (EGD), COMBINED N/A 3/19/2018    Procedure: COMBINED ESOPHAGOSCOPY, GASTROSCOPY, DUODENOSCOPY (EGD), REMOVE FOREIGN BODY;   Esophagodscopy, Gastroscopy, Duodenoscopy,Foreign Body Removal;  Surgeon: Brice Guzmán MD;  [facility]     ESOPHAGOSCOPY, GASTROSCOPY, DUODENOSCOPY (EGD), COMBINED N/A 4/16/2018    Procedure: COMBINED ESOPHAGOSCOPY, GASTROSCOPY, DUODENOSCOPY (EGD), REMOVE FOREIGN BODY;  Esophagogastroduodenoscopy  Foreign Body Removal X 2;  Surgeon:  Royer Moise MD;  [facility]     ESOPHAGOSCOPY, GASTROSCOPY, DUODENOSCOPY (EGD), COMBINED N/A 6/1/2018    Procedure: COMBINED ESOPHAGOSCOPY, GASTROSCOPY, DUODENOSCOPY (EGD), REMOVE FOREIGN BODY;  COMBINED ESOPHAGOSCOPY, GASTROSCOPY, DUODENOSCOPY with removal of foreign body, propofol sedation by anesthesia;  Surgeon: Brice Martinez MD;  Location:  GI     ESOPHAGOSCOPY, GASTROSCOPY, DUODENOSCOPY (EGD), COMBINED N/A 7/25/2018    Procedure: COMBINED ESOPHAGOSCOPY, GASTROSCOPY, DUODENOSCOPY (EGD), REMOVE FOREIGN BODY;;  Surgeon: Candy Castelan MD;  Location:  GI     ESOPHAGOSCOPY, GASTROSCOPY, DUODENOSCOPY (EGD), COMBINED N/A 7/28/2018    Procedure: COMBINED ESOPHAGOSCOPY, GASTROSCOPY, DUODENOSCOPY (EGD), REMOVE FOREIGN BODY;  COMBINED ESOPHAGOSCOPY, GASTROSCOPY, DUODENOSCOPY (EGD), REMOVE FOREIGN BODY;  Surgeon: Brice Guzmán MD;  [facility]     ESOPHAGOSCOPY, GASTROSCOPY, DUODENOSCOPY (EGD), COMBINED N/A 7/31/2018    Procedure: COMBINED ESOPHAGOSCOPY, GASTROSCOPY, DUODENOSCOPY (EGD);  COMBINED ESOPHAGOSCOPY, GASTROSCOPY, DUODENOSCOPY (EGD) TO REMOVE FOREIGN BODY;  Surgeon: Lokesh Paula MD;  [facility]     ESOPHAGOSCOPY, GASTROSCOPY, DUODENOSCOPY (EGD), COMBINED N/A 8/4/2018    Procedure: COMBINED ESOPHAGOSCOPY, GASTROSCOPY, DUODENOSCOPY (EGD), REMOVE FOREIGN BODY;   combined esophagoscopy, gastroscopy, duodenoscopy, REMOVE FOREIGN BODY ;  Surgeon: Lokesh Paula MD;  [facility]     ESOPHAGOSCOPY, GASTROSCOPY, DUODENOSCOPY (EGD), COMBINED N/A 10/6/2019    Procedure: ESOPHAGOGASTRODUODENOSCOPY (EGD) with fireign body removal x2, esophageal stent placement with floroscopy;  Surgeon: Timoteo Espana MD;  [facility]     ESOPHAGOSCOPY, GASTROSCOPY, DUODENOSCOPY (EGD), COMBINED N/A 12/2/2019    Procedure: Esophagogastroduodenoscopy with esophageal stent removal, esophogram;  Surgeon: Kailee Tena MD;  [facility]     ESOPHAGOSCOPY,  GASTROSCOPY, DUODENOSCOPY (EGD), COMBINED N/A 12/17/2019    Procedure: ESOPHAGOGASTRODUODENOSCOPY, WITH FOREIGN BODY REMOVAL;  Surgeon: Pamela Perez MD;  [facility]     ESOPHAGOSCOPY, GASTROSCOPY, DUODENOSCOPY (EGD), COMBINED N/A 12/13/2019    Procedure: ESOPHAGOGASTRODUODENOSCOPY, WITH FOREIGN BODY REMOVAL;  Surgeon: Samia Stanton MD;  [facility]     ESOPHAGOSCOPY, GASTROSCOPY, DUODENOSCOPY (EGD), COMBINED N/A 12/28/2019    Procedure: ESOPHAGOGASTRODUODENOSCOPY (EGD) Removal of Foreign Body X 2;  Surgeon: Huy Kelley MD;  [facility]     ESOPHAGOSCOPY, GASTROSCOPY, DUODENOSCOPY (EGD), COMBINED N/A 1/5/2020    Procedure: ESOPHAGOGASTRODUOENOSCOPY WITH FOREIGN BODY REMOVAL;  Surgeon: Pamela Perez MD;  [facility]     ESOPHAGOSCOPY, GASTROSCOPY, DUODENOSCOPY (EGD), COMBINED N/A 1/3/2020    Procedure: ESOPHAGOGASTRODUODENOSCOPY (EGD) REMOVAL OF FOREIGN BODY.;  Surgeon: Pamela Perez MD;  [facility]     ESOPHAGOSCOPY, GASTROSCOPY, DUODENOSCOPY (EGD), COMBINED N/A 1/13/2020    Procedure: ESOPHAGOGASTRODUODENOSCOPY (EGD) for foreign body removal;  Surgeon: Lokesh Paula MD;  [facility]     ESOPHAGOSCOPY, GASTROSCOPY, DUODENOSCOPY (EGD), COMBINED N/A 1/18/2020    Procedure: Diagnostic ESOPHAGOGASTRODUODENOSCOPY (EGD;  Surgeon: Lokesh Paula MD;  [facility]     ESOPHAGOSCOPY, GASTROSCOPY, DUODENOSCOPY (EGD), COMBINED N/A 3/29/2020    Procedure: UPPER ENDOSCOPY WITH FOREIGN BODY REMOVAL;  Surgeon: Doug Hansen MD;  [facility]     ESOPHAGOSCOPY, GASTROSCOPY, DUODENOSCOPY (EGD), COMBINED N/A 7/11/2020    Procedure: ESOPHAGOGASTRODUODENOSCOPY (EGD); Upper Endoscopy WITH FOREIGN BODY REMOVAL;  Surgeon: Lyndsey Mendoza DO;  [facility]     EXAM UNDER ANESTHESIA ANUS N/A 1/10/2017    Procedure: EXAM UNDER ANESTHESIA ANUS;  Surgeon: Annmarie Haynes MD;  [facility]     EXAM UNDER ANESTHESIA  RECTUM N/A 7/19/2018    Procedure: EXAM UNDER ANESTHESIA RECTUM;  EXAM UNDER ANESTHESIA, REMOVAL OF RECTAL FOREIGN BODY;  Surgeon: Annmarie Haynes MD;  [facility]     HC REMOVE FECAL IMPACTION OR FB W ANESTHESIA N/A 12/18/2016    Procedure: REMOVE FECAL IMPACTION/FOREIGN BODY UNDER ANESTHESIA;  Surgeon: Soham Cano MD;  [facility]     KNEE SURGERY - removed a small tissue mass from knee Right      LAPAROSCOPIC ABLATION ENDOMETRIOSIS       LAPAROTOMY EXPLORATORY N/A 1/10/2017    Procedure: LAPAROTOMY EXPLORATORY;  Surgeon: Annmarie Haynes MD;  [facility]     LAPAROTOMY EXPLORATORY  09/11/2019    Manual manipulation of bowel to remove pill bottle in rectum     lymph nodes removed from neck; benign  age 7     MAMMOPLASTY REDUCTION Bilateral      RELEASE CARPAL TUNNEL Bilateral      SIGMOIDOSCOPY FLEXIBLE N/A 1/10/2017    Procedure: SIGMOIDOSCOPY FLEXIBLE;  Surgeon: Annmarie Haynes MD;  [facility]     SIGMOIDOSCOPY FLEXIBLE N/A 5/8/2018    Procedure: SIGMOIDOSCOPY FLEXIBLE;  flex sig with foreign body removal using snare and rattooth forcep;  Surgeon: Soham Cano MD;  Location:  GI     SIGMOIDOSCOPY FLEXIBLE N/A 2/20/2019    Procedure: Exam under anesthesia Colonoscopy with attempted  removal of rectal foreign body;  Surgeon: Estrada Chávez MD;  [facility]             Family History:   FAMILY HISTORY:   Family History   Problem Relation Age of Onset     Diabetes Type 2  Maternal Grandmother      Diabetes Type 2  Paternal Grandmother      Breast Cancer Paternal Grandmother      Cerebrovascular Disease Father 53     Myocardial Infarction No family hx of      Coronary Artery Disease Early Onset No family hx of      Ovarian Cancer No family hx of      Colon Cancer No family hx of          HX OF SUICIDE IN FAMILY: denies  HX OF CD IN FAMILY: sister- alcohol        Social History:   As noted in my previous assessment: She grew up with 4 siblings in an  intact family.  She said they had to move a lot to maintain section 8 housing.  Her father works as a  part-time and her mother also worked part-time using retail.  Father is less involved in family life due to travel for work.  She did not graduate from high school, quitting in 12th grade and she does not have GED. 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 She is on Social Security Disability and lives in an apartment; he has also lived in group home through [facility].  She is close with several siblings.    Her sister,, who lives next door.  is deaf and has mental health and drinking issues and has not be talking to patient. This is a big stressor for patient. She is able to recognize co-dependency issues but does not appear to be actively working on this issue in therapy- has bene avoidant of this theme.             Physical ROS:   The patient endorsed moderate anxiety, no pain other than from mouth sores 3/10. The remainder of 10-point review of systems was negative except as noted in HPI.         Medications:     Current Facility-Administered Medications:      acetaminophen (TYLENOL) Suppository 650 mg, 650 mg, Rectal, Q4H PRN, Ken Muniz APRN CNP     acetaminophen (TYLENOL) tablet 650 mg, 650 mg, Oral, Q4H PRN, Ken Muniz APRN CNP     albuterol (PROAIR HFA/PROVENTIL HFA/VENTOLIN HFA) 108 (90 Base) MCG/ACT inhaler 2 puff, 2 puff, Inhalation, Q4H PRN, Ken Muniz APRN CNP     busPIRone (BUSPAR) tablet 10 mg, 10 mg, Oral, BID, Ken Muniz APRN CNP     cloNIDine (CATAPRES) tablet 0.1 mg, 0.1 mg, Oral, BID, Ken Muniz APRN CNP     desvenlafaxine (PRISTIQ) 24 hr tablet 100 mg, 100 mg, Oral, Daily, Ken Muniz APRN CNP     HYDROmorphone (DILAUDID) injection  0.2 mg, 0.2 mg, Intravenous, Q3H PRN, Ken Muniz APRN CNP, 0.2 mg at 07/29/20 0902     hydroxychloroquine (PLAQUENIL) tablet 200 mg, 200 mg, Oral, BID, Ken Muniz APRN CNP     lidocaine (LMX4) cream, , Topical, Q1H PRN, Ken Muniz APRN CNP     lidocaine 1 % 0.1-1 mL, 0.1-1 mL, Other, Q1H PRN, Ken Muniz APRN CNP     lurasidone (LATUDA) tablet 60 mg, 60 mg, Oral, At Bedtime, Ken Muniz APRN CNP     melatonin tablet 1 mg, 1 mg, Oral, At Bedtime PRN, Ken Muniz APRN CNP     metFORMIN (GLUCOPHAGE-XR) 24 hr tablet 1,000 mg, 1,000 mg, Oral, Daily with supper, Ken Muniz APRN CNP     naloxone (NARCAN) injection 0.1-0.4 mg, 0.1-0.4 mg, Intravenous, Q2 Min PRN, Ken Muniz APRN CNP     ondansetron (ZOFRAN-ODT) ODT tab 4 mg, 4 mg, Oral, Q6H PRN **OR** ondansetron (ZOFRAN) injection 4 mg, 4 mg, Intravenous, Q6H PRN, Ken Muniz APRN CNP     pantoprazole (PROTONIX) 40 mg IV push injection, 40 mg, Intravenous, Daily with breakfast, Ken Muniz APRN CNP, 40 mg at 07/28/20 2119     pregabalin (LYRICA) capsule 50 mg, 50 mg, Oral, TID, Ken Muniz APRN CNP     sodium chloride (PF) 0.9% PF flush 3 mL, 3 mL, Intracatheter, q1 min prn, Ken Muniz APRN CNP     sodium chloride (PF) 0.9% PF flush 3 mL, 3 mL, Intracatheter, Q8H, Ken Muniz APRN CNP     topiramate (TOPAMAX) tablet 100 mg, 100 mg, Oral, At Bedtime, Ken Muniz, HU CNP    Current Outpatient Medications:      acetaminophen (TYLENOL) 32 mg/mL liquid, Take 31.25 mLs (1,000 mg) by mouth every 6 hours as needed for fever or pain, Disp: 355 mL, Rfl: 0     albuterol (PROAIR HFA/PROVENTIL HFA/VENTOLIN HFA) 108 (90 Base) MCG/ACT inhaler, Inhale 2 puffs into the lungs as needed for shortness of breath / dyspnea or wheezing, Disp: , Rfl:      busPIRone (BUSPAR) 10 MG tablet, Take 1 tablet (10 mg) by mouth twice daily, Disp: ,  Rfl:      Cholecalciferol (VITAMIN D) 50 MCG (2000 UT) CAPS, Take 2,000 Units by mouth daily , Disp: , Rfl:      cloNIDine (CATAPRES) 0.1 MG tablet, Take 0.1 mg by mouth 2 times daily , Disp: , Rfl:      desvenlafaxine (PRISTIQ) 100 MG 24 hr tablet, Take 1 tablet (100 mg) by mouth every morning, Disp: , Rfl:      docosanol (ABREVA) 10 % CREA cream, Apply to lip 5 times a day as soon as symptoms begin, do not use for more than 10 days. Used PRN., Disp: , Rfl:      hydroxychloroquine (PLAQUENIL) 200 MG tablet, Take 200 mg by mouth 2 times daily, Disp: , Rfl:      hydrOXYzine (ATARAX) 50 MG tablet, Take 1 tablet (50 mg) by mouth 3 times daily as needed for anxiety, Disp: 30 tablet, Rfl: 0     lurasidone (LATUDA) 60 MG TABS tablet, Take 1 tablet (60 mg) by mouth at bedtime, Disp: , Rfl:      metFORMIN (GLUCOPHAGE-XR) 500 MG 24 hr tablet, Take 1,000 mg by mouth Take 1 tablet (500 mg) by mouth daily , Disp: , Rfl:      norethindrone (MICRONOR) 0.35 MG tablet, Take 0.35 mg by mouth daily, Disp: , Rfl:      ondansetron (ZOFRAN-ODT) 4 MG ODT tab, Take 4 mg by mouth every 6 hours as needed for nausea or vomiting , Disp: , Rfl:      pantoprazole (PROTONIX) 40 MG EC tablet, Take 1 tablet (40 mg) by mouth daily, Disp: 30 tablet, Rfl: 1     pregabalin (LYRICA) 50 MG capsule, Take 50 mg by mouth 3 times daily, Disp: , Rfl:      Prenatal Vit-Fe Fumarate-FA (PNV PRENATAL PLUS MULTIVITAMIN) 27-1 MG TABS per tablet, Take 1 tablet by mouth daily, Disp: , Rfl:      topiramate (TOPAMAX) 100 MG tablet, Take 50 mg by mouth Take 1 tablet (100 mg) by mouth daily at bedtime , Disp: , Rfl:   (Not in a hospital admission)         Allergies:     Allergies   Allergen Reactions     Amoxicillin-Pot Clavulanate Other (See Comments), Swelling and Rash     PN: facial swelling, left side. Also had cortisone injection the same day as she started the Augmentin.  Noted in downtime recovery of chart.       Penicillins Anaphylaxis     Vancomycin Itching,  Swelling and Rash     Other reaction(s): Redness  Flushed face, very itchy; HUT Comment: Flushed face, very itchy; HUT Reaction: Angioedema; HUT Reaction: Redness; HUT Severity: Med; HUT Noted: [id number]       Hydrocodone Nausea and Vomiting and GI Disturbance     vomiting for days, PN: vomiting for days; HUT Comment: vomiting for days; HUT Reaction: Gastrointestinal; HUT Reaction: Nausea And Vomiting; HUT Reaction Type: Intolerance; HUT Severity: Med; HUT Noted: [id number]  vomiting for days       Blood-Group Specific Substance Other (See Comments)     Patient has an anti-Cw and non-specific antibodies. Blood product orders may be delayed. Draw one red top and two purple top tubes for all type/screen/crossmatch orders.     Influenza Vaccines Other (See Comments)     Oseltamivir Hives     med stopped, PN: med stopped     Cephalosporins Rash     Lamotrigine Rash     Possibly associated with Lamictal.   HUT Comment: Possibly associated with Lamictal. ; HUT Reaction: Rash; HUT Reaction Type: Allergy; HUT Severity: Low; HUT Noted: [id number]     Latex Rash          Labs:     Recent Results (from the past 48 hour(s))   XR Abdomen 2 Views    Collection Time: 07/28/20  7:34 PM   Result Value Ref Range    Radiologist flags Possible ingested foreign body in the thorax (Urgent)    CBC with platelets differential    Collection Time: 07/28/20  8:54 PM   Result Value Ref Range    WBC 10.7 4.0 - 11.0 10e9/L    RBC Count 4.29 3.8 - 5.2 10e12/L    Hemoglobin 11.9 11.7 - 15.7 g/dL    Hematocrit 37.1 35.0 - 47.0 %    MCV 87 78 - 100 fl    MCH 27.7 26.5 - 33.0 pg    MCHC 32.1 31.5 - 36.5 g/dL    RDW 14.6 10.0 - 15.0 %    Platelet Count 276 150 - 450 10e9/L    Diff Method Automated Method     % Neutrophils 74.7 %    % Lymphocytes 17.8 %    % Monocytes 5.7 %    % Eosinophils 1.0 %    % Basophils 0.4 %    % Immature Granulocytes 0.4 %    Nucleated RBCs 0 0 /100    Absolute Neutrophil 8.0 1.6 - 8.3 10e9/L    Absolute Lymphocytes 1.9 0.8 - 5.3  "10e9/L    Absolute Monocytes 0.6 0.0 - 1.3 10e9/L    Absolute Eosinophils 0.1 0.0 - 0.7 10e9/L    Absolute Basophils 0.0 0.0 - 0.2 10e9/L    Abs Immature Granulocytes 0.0 0 - 0.4 10e9/L    Absolute Nucleated RBC 0.0    Basic metabolic panel    Collection Time: 07/28/20  8:54 PM   Result Value Ref Range    Sodium 140 133 - 144 mmol/L    Potassium 3.5 3.4 - 5.3 mmol/L    Chloride 111 (H) 94 - 109 mmol/L    Carbon Dioxide 23 20 - 32 mmol/L    Anion Gap 6 3 - 14 mmol/L    Glucose 98 70 - 99 mg/dL    Urea Nitrogen 8 7 - 30 mg/dL    Creatinine 0.76 0.52 - 1.04 mg/dL    GFR Estimate >90 >60 mL/min/[1.73_m2]    GFR Estimate If Black >90 >60 mL/min/[1.73_m2]    Calcium 8.7 8.5 - 10.1 mg/dL   INR    Collection Time: 07/28/20  8:54 PM   Result Value Ref Range    INR 1.11 0.86 - 1.14   Partial thromboplastin time    Collection Time: 07/28/20  8:54 PM   Result Value Ref Range    PTT 46 (H) 22 - 37 sec   Alcohol ethyl    Collection Time: 07/28/20  8:54 PM   Result Value Ref Range    Ethanol g/dL <0.01 <0.01 g/dL   Drug abuse screen 6 urine (chem dep)    Collection Time: 07/28/20  8:55 PM   Result Value Ref Range    Amphetamine Qual Urine Negative NEG^Negative    Barbiturates Qual Urine Negative NEG^Negative    Benzodiazepine Qual Urine Positive (A) NEG^Negative    Cannabinoids Qual Urine Negative NEG^Negative    Cocaine Qual Urine Negative NEG^Negative    Ethanol Qual Urine Negative NEG^Negative    Opiates Qualitative Urine Negative NEG^Negative   HCG qualitative urine (UPT)    Collection Time: 07/28/20  8:55 PM   Result Value Ref Range    HCG Qual Urine Negative NEG^Negative          Physical and Psychiatric Examination:     /86 (BP Location: Left arm)   Pulse 76   Temp 96  F (35.6  C) (Oral)   Resp 16   Ht 1.575 m (5' 2\")   Wt 115.7 kg (255 lb)   SpO2 98%   BMI 46.64 kg/m    Weight is 255 lbs 0 oz  Body mass index is 46.64 kg/m .    Physical Exam:  I have reviewed the physical exam as documented by the medical team " "and agree with findings and assessment and have no additional findings to add at this time.        Mental Status Exam  APPEARANCE/GROOMING/ATTITUDE: adequate grooming. Calm, tearful  ORIENTATION: Fully oriented  SPEECH: normal rate and rhythm  MOVEMENTS: to tics, tremor, rigidity or abnormal movements  THOUGHT PROCESS: logical, linear, goal directed. o loosening of associations  THOUGHT CONTENT: No SI or SIB. No HI. NO AH or VH. Anxiety 6/10. Depression 4/10  ATTENTION/CONCENTRATION/RECALL: intact; 3/3  MOOD: \"beating myself up about relapse\"  AFFECT: tearful, depressed.   INTELLECTUAL CAPACITY: Average  FUND OF KNOWLEDGE: intact  INSIGHT: understating behavior chain leading to SIB  JUDGMENT: some ongoing impairment I ability to utilize full aspects of outpatient treatment plan; avoidant issues  IMPULSE CONTROL:  While some SIB is impulsive she has a history of contemplating plan for several hours before SIB    RISK ASSESSMENT: Medical risk associated with SIB.               DSM-5 Diagnosis:   Major depressive disorder, recurrent, moderate  Borderline Personality Disorder  PTSD by history          Assessment:   The patient has unfortunately relapsed to a cycle of FOB ingestions and is quite remorseful today. She cites numerous stressors as discussed above. He has been avoidant of some of these issues in recent psychotherapy, then become more anxious and becomes desperate when she makes calls per her emergency plan and she is not able to get immediate reassurance and support. The SIB thoughts tend to occur at night.  I encourage her to be forthright in therapy and to be proactive in reviewing her safety plan  She is able to cite a period of time where she had been doing very well. As annoyed by patient today and by providers she has a very broad-based plan of outpatient services .  She has already accessed theses while in ED.  She denies SIB or SI now. She does not meet criteria for inpatient psychiatric care and she " "agrees to return home and follow up with outpatient team and contact team or \"warm line\" service available 24/7, and utilize DBT if she has SIB thoughts.                Summary of Recommendations:       Please page me if you have any question [phone]         Recommendations:  1. Per Gold 10 and specific care coordination plan as noted in Epic.   2. No psych medication changes at this time.  3. Patient states cold sore outbreak and requests acyclovir or similar.   4. Patient has moved her psychiatric follow up appt to August 11 and is contacting her therapist today  5. Continue 1:1 for now     Recommendations discussed with Gold 10.     "

## 2020-07-29 NOTE — ANESTHESIA CARE TRANSFER NOTE
Patient: Nevin Alvarado    Procedure(s):  ESOPHAGOGASTRODUODENOSCOPY REMOVAL OF FOREIGN BODY    Diagnosis: History of esophagogastroduodenoscopy [Z98.890]  Diagnosis Additional Information: No value filed.    Anesthesia Type:   General     Note:  Airway :Nasal Cannula  Patient transferred to:PACU  Comments: Awake, alert, responding appropriately. Stable, report to RN.Handoff Report: Identifed the Patient, Identified the Reponsible Provider, Reviewed the pertinent medical history, Discussed the surgical course, Reviewed Intra-OP anesthesia mangement and issues during anesthesia, Set expectations for post-procedure period and Allowed opportunity for questions and acknowledgement of understanding      Vitals: (Last set prior to Anesthesia Care Transfer)    CRNA VITALS  7/29/2020 1213 - 7/29/2020 1249      7/29/2020             EKG:  Sinus rhythm                Electronically Signed By: HU Jerry CRNA  July 29, 2020  12:49 PM

## 2020-07-29 NOTE — ED PROVIDER NOTES
[facility] EMERGENCY DEPARTMENT ([facility])  July 28, 2020  ED 9  7:20 PM   History     Chief Complaint   Patient presents with     Swallowed Foreign Body     The history is provided by the patient and medical records.     Nevin Alvarado is a 29 year old female who presents with swallowed foreign body. She is well known to the Emergency Department after swallowing a pen spring. She denies swallowing the ink cartridge. She has been engaging in swallowing things lately due to stressors, depression and anxiety. She has been feeling more depressed the past few weeks than usual and having more thoughts, urges to engage in self-injurious behavior. She swallowed a pen spring. She has an epigastric pain. No nausea or vomiting. She asks if she could have Tylenol or Toradol. She lives at home in her own independent living apartment with onsite staff. She has an ILS worker who helps her get out but otherwise she doesn't drive.     PAST MEDICAL HISTORY:   Past Medical History:   Diagnosis Date     ADD (attention deficit disorder)      Anorexia nervosa with bulimia     history of; on Topamax     Anxiety      Borderline personality disorder (H)      Depression      H/O self-harm      History of pulmonary embolism 12/2019    Provoked. Completed 3 month course of Apixaban     Morbid obesity (H)      Neuropathy      PTSD (post-traumatic stress disorder)      Rectal foreign body - Recurrent issue, self placed      Swallowed foreign body - Recurrent issue, self placed        PAST SURGICAL HISTORY:   Past Surgical History:   Procedure Laterality Date     COMBINED ESOPHAGOSCOPY, GASTROSCOPY, DUODENOSCOPY (EGD), REPLACE ESOPHAGEAL STENT N/A 10/9/2019    Procedure: Upper Endoscopy with Suture Placement;  Surgeon: Zurdo Ramirez MD;  Location:  OR     ESOPHAGOSCOPY, GASTROSCOPY, DUODENOSCOPY (EGD), COMBINED N/A 3/9/2017    Procedure: COMBINED ESOPHAGOSCOPY, GASTROSCOPY, DUODENOSCOPY (EGD), REMOVE FOREIGN BODY;   Surgeon: Avis Guzmán MD;  [facility]: [facility]     ESOPHAGOSCOPY, GASTROSCOPY, DUODENOSCOPY (EGD), COMBINED N/A 4/20/2017    Procedure: COMBINED ESOPHAGOSCOPY, GASTROSCOPY, DUODENOSCOPY (EGD), REMOVE FOREIGN BODY;  EGD removal Foregin body;  Surgeon: Lokesh Paula MD;  Location: [facility] OR     ESOPHAGOSCOPY, GASTROSCOPY, DUODENOSCOPY (EGD), COMBINED N/A 6/12/2017    Procedure: COMBINED ESOPHAGOSCOPY, GASTROSCOPY, DUODENOSCOPY (EGD);  COMBINED ESOPHAGOSCOPY, GASTROSCOPY, DUODENOSCOPY (EGD) [[id number]]attempted removal of foreign body;  Surgeon: Pamela Perez MD;  Location: [facility]     ESOPHAGOSCOPY, GASTROSCOPY, DUODENOSCOPY (EGD), COMBINED N/A 6/9/2017    Procedure: COMBINED ESOPHAGOSCOPY, GASTROSCOPY, DUODENOSCOPY (EGD), REMOVE FOREIGN BODY;  Esophagoscopy, Gastroscopy, Duodenoscopy, Removal of Foreign Body;  Surgeon: Dejon Marsh MD;  Location: [facility]     ESOPHAGOSCOPY, GASTROSCOPY, DUODENOSCOPY (EGD), COMBINED N/A 1/6/2018    Procedure: COMBINED ESOPHAGOSCOPY, GASTROSCOPY, DUODENOSCOPY (EGD), REMOVE FOREIGN BODY;  COMBINED ESOPHAGOSCOPY, GASTROSCOPY, DUODENOSCOPY (EGD) [by pascal net and snare with profol sedation;  Surgeon: Feliciano Emmanuel MD;  Location:  GI     ESOPHAGOSCOPY, GASTROSCOPY, DUODENOSCOPY (EGD), COMBINED N/A 3/19/2018    Procedure: COMBINED ESOPHAGOSCOPY, GASTROSCOPY, DUODENOSCOPY (EGD), REMOVE FOREIGN BODY;   Esophagodscopy, Gastroscopy, Duodenoscopy,Foreign Body Removal;  Surgeon: Brice Guzmán MD;  [facility]     ESOPHAGOSCOPY, GASTROSCOPY, DUODENOSCOPY (EGD), COMBINED N/A 4/16/2018    Procedure: COMBINED ESOPHAGOSCOPY, GASTROSCOPY, DUODENOSCOPY (EGD), REMOVE FOREIGN BODY;  Esophagogastroduodenoscopy  Foreign Body Removal X 2;  Surgeon: Royer Moise MD;  [facility]     ESOPHAGOSCOPY, GASTROSCOPY, DUODENOSCOPY (EGD), COMBINED N/A 6/1/2018    Procedure: COMBINED ESOPHAGOSCOPY, GASTROSCOPY, DUODENOSCOPY (EGD), REMOVE FOREIGN  BODY;  COMBINED ESOPHAGOSCOPY, GASTROSCOPY, DUODENOSCOPY with removal of foreign body, propofol sedation by anesthesia;  Surgeon: Brice Martinez MD;  Location:  GI     ESOPHAGOSCOPY, GASTROSCOPY, DUODENOSCOPY (EGD), COMBINED N/A 7/25/2018    Procedure: COMBINED ESOPHAGOSCOPY, GASTROSCOPY, DUODENOSCOPY (EGD), REMOVE FOREIGN BODY;;  Surgeon: Candy Castelan MD;  Location:  GI     ESOPHAGOSCOPY, GASTROSCOPY, DUODENOSCOPY (EGD), COMBINED N/A 7/28/2018    Procedure: COMBINED ESOPHAGOSCOPY, GASTROSCOPY, DUODENOSCOPY (EGD), REMOVE FOREIGN BODY;  COMBINED ESOPHAGOSCOPY, GASTROSCOPY, DUODENOSCOPY (EGD), REMOVE FOREIGN BODY;  Surgeon: Brice Guzmán MD;  [facility]     ESOPHAGOSCOPY, GASTROSCOPY, DUODENOSCOPY (EGD), COMBINED N/A 7/31/2018    Procedure: COMBINED ESOPHAGOSCOPY, GASTROSCOPY, DUODENOSCOPY (EGD);  COMBINED ESOPHAGOSCOPY, GASTROSCOPY, DUODENOSCOPY (EGD) TO REMOVE FOREIGN BODY;  Surgeon: Lokesh Paula MD;  [facility]     ESOPHAGOSCOPY, GASTROSCOPY, DUODENOSCOPY (EGD), COMBINED N/A 8/4/2018    Procedure: COMBINED ESOPHAGOSCOPY, GASTROSCOPY, DUODENOSCOPY (EGD), REMOVE FOREIGN BODY;   combined esophagoscopy, gastroscopy, duodenoscopy, REMOVE FOREIGN BODY ;  Surgeon: Lokesh Paula MD;  [facility]     ESOPHAGOSCOPY, GASTROSCOPY, DUODENOSCOPY (EGD), COMBINED N/A 10/6/2019    Procedure: ESOPHAGOGASTRODUODENOSCOPY (EGD) with fireign body removal x2, esophageal stent placement with floroscopy;  Surgeon: Timoteo Espana MD;  [facility]     ESOPHAGOSCOPY, GASTROSCOPY, DUODENOSCOPY (EGD), COMBINED N/A 12/2/2019    Procedure: Esophagogastroduodenoscopy with esophageal stent removal, esophogram;  Surgeon: Kailee Tena MD;  [facility]: [facility]     ESOPHAGOSCOPY, GASTROSCOPY, DUODENOSCOPY (EGD), COMBINED N/A 12/17/2019    Procedure: ESOPHAGOGASTRODUODENOSCOPY, WITH FOREIGN BODY REMOVAL;  Surgeon: Pamela Perez MD;  [facility]     ESOPHAGOSCOPY,  GASTROSCOPY, DUODENOSCOPY (EGD), COMBINED N/A 12/13/2019    Procedure: ESOPHAGOGASTRODUODENOSCOPY, WITH FOREIGN BODY REMOVAL;  Surgeon: Samia Stanton MD;  [facility]     ESOPHAGOSCOPY, GASTROSCOPY, DUODENOSCOPY (EGD), COMBINED N/A 12/28/2019    Procedure: ESOPHAGOGASTRODUODENOSCOPY (EGD) Removal of Foreign Body X 2;  Surgeon: Huy Kelley MD;  [facility]     ESOPHAGOSCOPY, GASTROSCOPY, DUODENOSCOPY (EGD), COMBINED N/A 1/5/2020    Procedure: ESOPHAGOGASTRODUOENOSCOPY WITH FOREIGN BODY REMOVAL;  Surgeon: Pamela Perez MD;  [facility]     ESOPHAGOSCOPY, GASTROSCOPY, DUODENOSCOPY (EGD), COMBINED N/A 1/3/2020    Procedure: ESOPHAGOGASTRODUODENOSCOPY (EGD) REMOVAL OF FOREIGN BODY.;  Surgeon: Pamela Perez MD;  [facility]     ESOPHAGOSCOPY, GASTROSCOPY, DUODENOSCOPY (EGD), COMBINED N/A 1/13/2020    Procedure: ESOPHAGOGASTRODUODENOSCOPY (EGD) for foreign body removal;  Surgeon: Lokesh Paula MD;  [facility]     ESOPHAGOSCOPY, GASTROSCOPY, DUODENOSCOPY (EGD), COMBINED N/A 1/18/2020    Procedure: Diagnostic ESOPHAGOGASTRODUODENOSCOPY (EGD;  Surgeon: Lokesh Paula MD;  [facility]     ESOPHAGOSCOPY, GASTROSCOPY, DUODENOSCOPY (EGD), COMBINED N/A 3/29/2020    Procedure: UPPER ENDOSCOPY WITH FOREIGN BODY REMOVAL;  Surgeon: Doug Hansen MD;  [facility]     ESOPHAGOSCOPY, GASTROSCOPY, DUODENOSCOPY (EGD), COMBINED N/A 7/11/2020    Procedure: ESOPHAGOGASTRODUODENOSCOPY (EGD); Upper Endoscopy WITH FOREIGN BODY REMOVAL;  Surgeon: Lyndsey Mendoza DO;  [facility]     EXAM UNDER ANESTHESIA ANUS N/A 1/10/2017    Procedure: EXAM UNDER ANESTHESIA ANUS;  Surgeon: Annmarie Haynes MD;  [facility]     EXAM UNDER ANESTHESIA RECTUM N/A 7/19/2018    Procedure: EXAM UNDER ANESTHESIA RECTUM;  EXAM UNDER ANESTHESIA, REMOVAL OF RECTAL FOREIGN BODY;  Surgeon: Annmarie Haynes MD;  [facility]     HC REMOVE FECAL  IMPACTION OR FB W ANESTHESIA N/A 12/18/2016    Procedure: REMOVE FECAL IMPACTION/FOREIGN BODY UNDER ANESTHESIA;  Surgeon: Soham Cano MD;  Location: [facility] OR     KNEE SURGERY - removed a small tissue mass from knee Right      LAPAROSCOPIC ABLATION ENDOMETRIOSIS       LAPAROTOMY EXPLORATORY N/A 1/10/2017    Procedure: LAPAROTOMY EXPLORATORY;  Surgeon: Annmarie Haynes MD;  [facility]     LAPAROTOMY EXPLORATORY  09/11/2019    Manual manipulation of bowel to remove pill bottle in rectum     lymph nodes removed from neck; benign  age 7     MAMMOPLASTY REDUCTION Bilateral      RELEASE CARPAL TUNNEL Bilateral      SIGMOIDOSCOPY FLEXIBLE N/A 1/10/2017    Procedure: SIGMOIDOSCOPY FLEXIBLE;  Surgeon: Annmarie Haynes MD;  [facility]     SIGMOIDOSCOPY FLEXIBLE N/A 5/8/2018    Procedure: SIGMOIDOSCOPY FLEXIBLE;  flex sig with foreign body removal using snare and rattooth forcep;  Surgeon: Soham Cano MD;  Location: [facility]     SIGMOIDOSCOPY FLEXIBLE N/A 2/20/2019    Procedure: Exam under anesthesia Colonoscopy with attempted  removal of rectal foreign body;  Surgeon: Estrada Chávez MD;  Location: [facility]       Past medical history, past surgical history, medications, and allergies were reviewed with the patient. Additional pertinent items: None    FAMILY HISTORY:   Family History   Problem Relation Age of Onset     Diabetes Type 2  Maternal Grandmother      Diabetes Type 2  Paternal Grandmother      Breast Cancer Paternal Grandmother      Cerebrovascular Disease Father 53     Myocardial Infarction No family hx of      Coronary Artery Disease Early Onset No family hx of      Ovarian Cancer No family hx of      Colon Cancer No family hx of        SOCIAL HISTORY:   Social History     Tobacco Use     Smoking status: Never Smoker     Smokeless tobacco: Never Used   Substance Use Topics     Alcohol use: No     Alcohol/week: 0.0 standard drinks     Social history was reviewed with the patient.  Additional pertinent items: None      Patient's Medications   New Prescriptions    No medications on file   Previous Medications    ACETAMINOPHEN (TYLENOL) 32 MG/ML LIQUID    Take 31.25 mLs (1,000 mg) by mouth every 6 hours as needed for fever or pain    ALBUTEROL (PROAIR HFA/PROVENTIL HFA/VENTOLIN HFA) 108 (90 BASE) MCG/ACT INHALER    Inhale 2 puffs into the lungs as needed for shortness of breath / dyspnea or wheezing    BUSPIRONE (BUSPAR) 10 MG TABLET    Take 1 tablet (10 mg) by mouth twice daily    CHOLECALCIFEROL (VITAMIN D) 50 MCG (2000 UT) CAPS    Take 2,000 Units by mouth daily     CLONIDINE (CATAPRES) 0.1 MG TABLET    Take 0.1 mg by mouth 2 times daily     DESVENLAFAXINE (PRISTIQ) 100 MG 24 HR TABLET    Take 1 tablet (100 mg) by mouth every morning    DOCOSANOL (ABREVA) 10 % CREA CREAM    Apply to lip 5 times a day as soon as symptoms begin, do not use for more than 10 days. Used PRN.    HYDROXYCHLOROQUINE (PLAQUENIL) 200 MG TABLET    Take 200 mg by mouth 2 times daily    HYDROXYZINE (ATARAX) 50 MG TABLET    Take 1 tablet (50 mg) by mouth 3 times daily as needed for anxiety    LURASIDONE (LATUDA) 60 MG TABS TABLET    Take 1 tablet (60 mg) by mouth at bedtime    METFORMIN (GLUCOPHAGE-XR) 500 MG 24 HR TABLET    Take 1,000 mg by mouth Take 1 tablet (500 mg) by mouth daily     NORETHINDRONE (MICRONOR) 0.35 MG TABLET    Take 0.35 mg by mouth daily    ONDANSETRON (ZOFRAN-ODT) 4 MG ODT TAB    Take 4 mg by mouth every 6 hours as needed for nausea or vomiting     PANTOPRAZOLE (PROTONIX) 40 MG EC TABLET    Take 1 tablet (40 mg) by mouth daily    PREGABALIN (LYRICA) 50 MG CAPSULE    Take 50 mg by mouth 3 times daily    PRENATAL VIT-FE FUMARATE-FA (PNV PRENATAL PLUS MULTIVITAMIN) 27-1 MG TABS PER TABLET    Take 1 tablet by mouth daily    TOPIRAMATE (TOPAMAX) 100 MG TABLET    Take 50 mg by mouth Take 1 tablet (100 mg) by mouth daily at bedtime    Modified Medications    No medications on file   Discontinued Medications  "   No medications on file          Allergies   Allergen Reactions     Amoxicillin-Pot Clavulanate Other (See Comments), Swelling and Rash     PN: facial swelling, left side. Also had cortisone injection the same day as she started the Augmentin.  Noted in downtime recovery of chart.       Penicillins Anaphylaxis     Vancomycin Itching, Swelling and Rash     Other reaction(s): Redness  Flushed face, very itchy; HUT Comment: Flushed face, very itchy; HUT Reaction: Angioedema; HUT Reaction: Redness; HUT Severity: Med; HUT Noted: [id number]       Hydrocodone Nausea and Vomiting and GI Disturbance     vomiting for days, PN: vomiting for days; HUT Comment: vomiting for days; HUT Reaction: Gastrointestinal; HUT Reaction: Nausea And Vomiting; HUT Reaction Type: Intolerance; HUT Severity: Med; HUT Noted: [id number]  vomiting for days       Blood-Group Specific Substance Other (See Comments)     Patient has an anti-Cw and non-specific antibodies. Blood product orders may be delayed. Draw one red top and two purple top tubes for all type/screen/crossmatch orders.     Influenza Vaccines Other (See Comments)     Oseltamivir Hives     med stopped, PN: med stopped     Cephalosporins Rash     Lamotrigine Rash     Possibly associated with Lamictal.   HUT Comment: Possibly associated with Lamictal. ; HUT Reaction: Rash; HUT Reaction Type: Allergy; HUT Severity: Low; HUT Noted: [id number]     Latex Rash        Review of Systems   Gastrointestinal: Positive for abdominal pain. Negative for nausea and vomiting.     A complete review of systems was performed with pertinent positives and negatives noted in the HPI, and all other systems negative.    Physical Exam   BP: (!) 143/86  Pulse: 90  Temp: 99.1  F (37.3  C)  Resp: 16  Height: 157.5 cm (5' 2\")  Weight: 115.7 kg (255 lb)  SpO2: 97 %      Physical Exam  Constitutional:       General: She is not in acute distress.     Appearance: She is not diaphoretic.   HENT:      Head: Normocephalic.     "  Mouth/Throat:      Pharynx: No oropharyngeal exudate.   Eyes:      Extraocular Movements: Extraocular movements intact.   Neck:      Musculoskeletal: Neck supple.   Cardiovascular:      Rate and Rhythm: Normal rate and regular rhythm.      Heart sounds: Normal heart sounds.   Pulmonary:      Effort: No respiratory distress.      Breath sounds: Normal breath sounds.   Abdominal:      General: There is no distension.      Palpations: Abdomen is soft.      Tenderness: There is abdominal tenderness.      Comments: Mild epigastric tenderness   Musculoskeletal:         General: No deformity.   Skin:     General: Skin is warm and dry.   Neurological:      Mental Status: She is alert.      Comments: alert   Psychiatric:         Behavior: Behavior normal.         ED Course        Procedures        Results for orders placed or performed during the hospital encounter of 07/28/20 (from the past 24 hour(s))   XR Abdomen 2 Views   Result Value Ref Range    Radiologist flags Possible ingested foreign body in the thorax (Urgent)     Narrative    Exam: XR ABDOMEN 2 VW, 7/28/2020 7:34 PM    Indication: swallowed pen spring    Comparison: Abdominal radiograph 7/11/2020    Findings: Upright AP and lateral radiographs of the abdomen. There is  a linear thin wire-like radiodensity projecting over the distal  esophagus. Partially visualized central venous catheter tip  terminating over the right atrium. The radiopaque object present  previously overlying the epigastrium on prior abdominal radiograph is  no longer identified. Nondistended bowel gas pattern. Multiple  air-fluid levels in the right abdomen, nonspecific. Chronic elevation  of the right hemidiaphragm.      Impression    Impression:   1. Linear, thin wire-like radiodensity projecting over the distal  esophagus, may represent described straightened out spring.  2. Scattered air-fluid levels in the right abdomen, nonspecific.     [Urgent Result: Possible ingested foreign body in  the thorax]    Finding was identified on 7/28/2020 7:48 PM.     Dr. Diego  was contacted by Dr. Yousif at 7/28/2020 8:00 PM and  verbalized understanding of the urgent finding.        I have personally reviewed the examination and initial interpretation  and I agree with the findings.    ROLANDA MCCLAIN MD     Medications   0.9% sodium chloride BOLUS (has no administration in time range)     Followed by   sodium chloride 0.9% infusion (has no administration in time range)   pantoprazole (PROTONIX) 40 mg IV push injection (has no administration in time range)   ketorolac (TORADOL) injection 30 mg (30 mg Intramuscular Given 7/28/20 1948)      Results for orders placed or performed during the hospital encounter of 07/28/20   XR Abdomen 2 Views     Status: Abnormal   Result Value Ref Range    Radiologist flags Possible ingested foreign body in the thorax (Urgent)     Narrative    Exam: XR ABDOMEN 2 VW, 7/28/2020 7:34 PM    Indication: swallowed pen spring    Comparison: Abdominal radiograph 7/11/2020    Findings: Upright AP and lateral radiographs of the abdomen. There is  a linear thin wire-like radiodensity projecting over the distal  esophagus. Partially visualized central venous catheter tip  terminating over the right atrium. The radiopaque object present  previously overlying the epigastrium on prior abdominal radiograph is  no longer identified. Nondistended bowel gas pattern. Multiple  air-fluid levels in the right abdomen, nonspecific. Chronic elevation  of the right hemidiaphragm.      Impression    Impression:   1. Linear, thin wire-like radiodensity projecting over the distal  esophagus, may represent described straightened out spring.  2. Scattered air-fluid levels in the right abdomen, nonspecific.     [Urgent Result: Possible ingested foreign body in the thorax]    Finding was identified on 7/28/2020 7:48 PM.     Dr. Diego  was contacted by Dr. Yousif at 7/28/2020 8:00 PM and  verbalized  understanding of the urgent finding.        I have personally reviewed the examination and initial interpretation  and I agree with the findings.    ROLANDA MCCLAIN MD            Assessments & Plan (with Medical Decision Making)   29-year-old female presents to us with a chief complaint of intentional swelling of a foreign body.  The foreign body appears to be in her distal esophagus on x-ray.  Discussed with GI.  They recommend admission and they will remove it in the a.m.  Patient reported she has had increasing issues with thoughts of self-harm and anxiety.  This led to her swallowing the foreign body today.  She will also get a mental health care assessment.    I have reviewed the nursing notes.    I have reviewed the findings, diagnosis, plan and need for follow up with the patient.    New Prescriptions    No medications on file       Final diagnoses:   Foreign body in esophagus, initial encounter       7/28/2020   [facility], [facility], EMERGENCY DEPARTMENT     Efraín Diego,   07/28/20 2041

## 2020-07-29 NOTE — PROGRESS NOTES
I received a call about a patient who swallowed the spring inside a pen. It is seen on xray to be in the distal esophagus.    This object is not significantly sharp enough to warrant emergent EGD and can be removed within 24 hours.    Recommendations:  -NPO  -chest and abdominal xray in the AM      GI will continue to follow.    Discussed with Dr Paula.    Berenice Schmitz MD PhD   Gastroenterology Fellow

## 2020-07-29 NOTE — DISCHARGE SUMMARY
[facility] [place]  Hospitalist Discharge Summary      Date of Admission:  7/28/2020  Date of Discharge:  7/30/2020  Discharging Provider: Dr. Willow Hart  Discharge Team: Hospitalist Service, Gold      Discharge Diagnoses   Foreign Body Ingestion, PTSD, BPD, ADD, Self Injurious Behavior     Follow-ups Needed After Discharge   Patient will discharge to previous living situation.    Unresulted Labs Ordered in the Past 30 Days of this Admission     No orders found from 6/28/2020 to 7/29/2020.      These results will be followed up by me      Discharge Disposition   Discharged to home with services  Condition at discharge: Stable      Hospital Course   Nevin Alvarado is a 29 year old woman with past medical history of pulmonary embolus, BPD, ADD, anorexia, PTSD and foreign body ingestion and is admitted following a pen spring ingestion.     #  Foreign body ingestion in the setting of PTSD, BPD, ADD  Patient ingested a foreign body (pen spring) on 7/11/20 during an episode of anxiety. She has had multiple admissions for foreign body ingestion and is familiar to the service.  She was doing well until earlier this month and has now had a series of admissions for foreign body ingestion over the past two weeks. She was admitted on 7/11/20 for ingestion of a paper clip. On 7/28/20, she presented to the ED for swallowing a pen spring with mild epigastric tenderness, otherwise asymptomatic. She was then admitted to the floor and made NPO. Due to soft nature of object, intervention was deferred for 24 hours. Gastroenterology completed EGD finding pen spring in her stomach on 7/29/20. She was stable post-procedure and had no nausea. She tolerated a regular diet and was discharge home.  -  Follow up as previously planned with Psychiatry, patient was able to move up next appointment  -  Continue therapy        Consultations This Hospital Stay   PSYCHIATRY IP CONSULT  PSYCHOLOGY ADULT IP  CONSULT    Code Status   Full Code    Time Spent on this Encounter   I, Willow Hart MD, personally saw the patient today and spent greater than 30 minutes discharging this patient.        Reece Arana, MS4 on 7/30/2020 at 10:37 AM    [facility], [place]  ______________________________________________________________________    Physical Exam   Vital Signs: Temp: (P) 97.8  F (36.6  C) Temp src: (P) Oral BP: 120/77 Pulse: 70 Heart Rate: 71 Resp: (P) 18 SpO2: 97 % O2 Device: None (Room air) Oxygen Delivery: 4 LPM  Weight: 255 lbs 0 oz  General Appearance: Sitting in bed in NAD  Respiratory: CTAB  Cardiovascular: RRR, no m/r/g, peripheral pulses intact  GI: NABS, soft, NT, ND  Neuro: Alert, interactive  Psych: Flat affect, no plans to ingest, has outpatient plan for ongoing support        Primary Care Physician   Latonya Knight    Discharge Orders   No discharge procedures on file.    Significant Results and Procedures      Results for orders placed or performed during the hospital encounter of 07/28/20   XR Abdomen 2 Views     Value    Radiologist flags Possible ingested foreign body in the thorax (Urgent)    Narrative    Exam: XR ABDOMEN 2 VW, 7/28/2020 7:34 PM    Indication: swallowed pen spring    Comparison: Abdominal radiograph 7/11/2020    Findings: Upright AP and lateral radiographs of the abdomen. There is  a linear thin wire-like radiodensity projecting over the distal  esophagus. Partially visualized central venous catheter tip  terminating over the right atrium. The radiopaque object present  previously overlying the epigastrium on prior abdominal radiograph is  no longer identified. Nondistended bowel gas pattern. Multiple  air-fluid levels in the right abdomen, nonspecific. Chronic elevation  of the right hemidiaphragm.      Impression    Impression:   1. Linear, thin wire-like radiodensity projecting over the distal  esophagus, may represent described straightened  out spring.  2. Scattered air-fluid levels in the right abdomen, nonspecific.     [Urgent Result: Possible ingested foreign body in the thorax]    Finding was identified on 7/28/2020 7:48 PM.     Dr. Diego  was contacted by Dr. Yousif at 7/28/2020 8:00 PM and  verbalized understanding of the urgent finding.        I have personally reviewed the examination and initial interpretation  and I agree with the findings.    ROLANDA MCCLAIN MD   XR Abdomen Port 1 View    Narrative    Exam: XR ABDOMEN PORT 1 VW, 7/29/2020 8:27 AM    Indication: locate pin    Comparison: Abdomen 7/28/2020    Findings:   Supine portable abdominal radiographs. The linear radiodensity present  previously overlying the mediastinum is now demonstrated is the lower  mediastinum extending into the proximal stomach. No additional  radiopaque foreign bodies identified in the field-of-view. The pelvis  has not been included. Nonobstructive bowel gas pattern. Assessment  for free air is limited on supine imaging. Osseous structures stable  with scoliosis noted.    Tip of a central venous catheter in the thorax present. Stable  elevation of the right hemidiaphragm.      Impression    Impression: The ingested radiopaque structure present previously in  the mediastinum has moved distally, now appears to be entering the  stomach. Assessment for perforation/free air limited on supine  abdominal radiographs.    ROLANDA MCCLAIN MD       Discharge Medications   Current Discharge Medication List      CONTINUE these medications which have NOT CHANGED    Details   acetaminophen (TYLENOL) 32 mg/mL liquid Take 31.25 mLs (1,000 mg) by mouth every 6 hours as needed for fever or pain  Qty: 355 mL, Refills: 0    Associated Diagnoses: Esophageal dysphagia; Hx of foreign body ingestion      albuterol (PROAIR HFA/PROVENTIL HFA/VENTOLIN HFA) 108 (90 Base) MCG/ACT inhaler Inhale 2 puffs into the lungs as needed for shortness of breath / dyspnea or wheezing     Comments: Pharmacy may dispense brand covered by insurance (Proair, or proventil or ventolin or generic albuterol inhaler)      busPIRone (BUSPAR) 10 MG tablet Take 1 tablet (10 mg) by mouth twice daily      Cholecalciferol (VITAMIN D) 50 MCG (2000 UT) CAPS Take 2,000 Units by mouth daily       cloNIDine (CATAPRES) 0.1 MG tablet Take 0.1 mg by mouth 2 times daily       desvenlafaxine (PRISTIQ) 100 MG 24 hr tablet Take 1 tablet (100 mg) by mouth every morning      docosanol (ABREVA) 10 % CREA cream Apply to lip 5 times a day as soon as symptoms begin, do not use for more than 10 days. Used PRN.      hydroxychloroquine (PLAQUENIL) 200 MG tablet Take 200 mg by mouth 2 times daily      hydrOXYzine (ATARAX) 50 MG tablet Take 1 tablet (50 mg) by mouth 3 times daily as needed for anxiety  Qty: 30 tablet, Refills: 0    Associated Diagnoses: History of posttraumatic stress disorder (PTSD)      lurasidone (LATUDA) 60 MG TABS tablet Take 1 tablet (60 mg) by mouth at bedtime      metFORMIN (GLUCOPHAGE-XR) 500 MG 24 hr tablet Take 1,000 mg by mouth Take 1 tablet (500 mg) by mouth daily       norethindrone (MICRONOR) 0.35 MG tablet Take 0.35 mg by mouth daily      ondansetron (ZOFRAN-ODT) 4 MG ODT tab Take 4 mg by mouth every 6 hours as needed for nausea or vomiting       pantoprazole (PROTONIX) 40 MG EC tablet Take 1 tablet (40 mg) by mouth daily  Qty: 30 tablet, Refills: 1    Comments: May stop after 8 weeks  Associated Diagnoses: Swallowed foreign body, subsequent encounter      pregabalin (LYRICA) 50 MG capsule Take 50 mg by mouth 3 times daily      Prenatal Vit-Fe Fumarate-FA (PNV PRENATAL PLUS MULTIVITAMIN) 27-1 MG TABS per tablet Take 1 tablet by mouth daily      topiramate (TOPAMAX) 100 MG tablet Take 50 mg by mouth Take 1 tablet (100 mg) by mouth daily at bedtime            Allergies   Allergies   Allergen Reactions     Amoxicillin-Pot Clavulanate Other (See Comments), Swelling and Rash     PN: facial swelling, left  side. Also had cortisone injection the same day as she started the Augmentin.  Noted in downtime recovery of chart.       Penicillins Anaphylaxis     Vancomycin Itching, Swelling and Rash     Other reaction(s): Redness  Flushed face, very itchy; HUT Comment: Flushed face, very itchy; HUT Reaction: Angioedema; HUT Reaction: Redness; HUT Severity: Med; HUT Noted: [id number]       Hydrocodone Nausea and Vomiting and GI Disturbance     vomiting for days, PN: vomiting for days; HUT Comment: vomiting for days; HUT Reaction: Gastrointestinal; HUT Reaction: Nausea And Vomiting; HUT Reaction Type: Intolerance; HUT Severity: Med; HUT Noted: [id number]  vomiting for days       Blood-Group Specific Substance Other (See Comments)     Patient has an anti-Cw and non-specific antibodies. Blood product orders may be delayed. Draw one red top and two purple top tubes for all type/screen/crossmatch orders.     Influenza Vaccines Other (See Comments)     Oseltamivir Hives     med stopped, PN: med stopped     Cephalosporins Rash     Lamotrigine Rash     Possibly associated with Lamictal.   HUT Comment: Possibly associated with Lamictal. ; HUT Reaction: Rash; HUT Reaction Type: Allergy; HUT Severity: Low; HUT Noted: [id number]     Latex Rash

## 2020-07-29 NOTE — PROGRESS NOTES
"[facility], [place]    Medicine Progress Note - Hospitalist Service, [facility] 10       Date of Admission:  7/28/2020  Assessment & Plan   Nevin Alvarado is a 29 year old woman admitted on 7/11/2020. She has a history of pulmonary embolus, BPD, ADD, anorexia, PTSD and foreign body ingestion and is admitted following a pen spring ingestion.    1) Ingested pen spring in the setting of a history of PTSD, BPD, ADD, and self injurious behavior- Patient ingested a pen spring day of admission during an episode of anxiety.  She has had multiple admissions for foreign body ingestion and is familiar to our service.  She was doing quite well until earlier this month and has now had a series of admissions for foreign body ingestion over the past two weeks.  - Consult GI for EGD, appreciate intervention  - Psychiatry consult, appreciate recommendations.  Patient's outpatient appointment moved up.  - Feels safe with outpatient follow-up plan, has multiple people she can contact and a \"warm line\" if she feels urges for self-injurious behavior  - Regular diet  - Tylenol PRN pain, low dose hydromorphone available as well  - Patient previously had a guardian but this has been ended by the court  - Sitter, swallow precautions     2) History of PTSD, BPD, ADD  - Continue home buspirone, desvenlafaxine, hydroxyzine, lurasidone, pregabalin, topiramate     3) History of pulmonary embolism  - No longer on anticoagulation     4) History of granuloma annulare  - Continue Plaquenil     5) Metabolic syndrome  - Continue metformin    6) HSV 1, oral   -  Valacyclovir 2g q12h x2 doses  -  Encouraged patient to seek a standing script at her outpatient pharmacy by her PCP          Diet: Regular Diet Adult    DVT Prophylaxis: Low Risk/Ambulatory with no VTE prophylaxis indicated  Hazel Catheter: not present  Code Status: Full Code           Disposition Plan   Expected discharge: Tomorrow, recommended to prior living " arrangement once adequate pain management/ tolerating PO medications and safe disposition plan/ TCU bed available.  Entered: Willow Hart MD 07/29/2020, 5:09 PM       The patient's care was discussed with the Patient and GI and Psych Consultant.    Willow Hart MD  Hospitalist Service, [facility]  [facility], [place]  Pager: [phone]  Please see sticky note for cross cover information  ______________________________________________________________________    Interval History   Nursing notes reviewed, no acute events overnight.  Scope went well today, spring removed.  No nausea.  Tolerating diet well, swallowing food and fluids without issue.  No dyspnea.  Mood stable.  Okay with outpatient plan, feels safe.    Data reviewed today: I reviewed all medications, new labs and imaging results over the last 24 hours.     Physical Exam   Vital Signs: Temp: 97.9  F (36.6  C) Temp src: Oral BP: 120/77 Pulse: 70 Heart Rate: 71 Resp: 16 SpO2: 100 % O2 Device: None (Room air) Oxygen Delivery: 4 LPM  Weight: 255 lbs 0 oz  General Appearance: Sitting in bed in NAD  Respiratory: CTAB  Cardiovascular: RRR, no m/r/g, peripheral pulses intact  GI: NABS, soft, NT, ND  Neuro: Alert, appropriately interactive  Psych:  Full affect, has insight, thought process logical.  No urges for self injurious behavior now, has plan for safety.    Data   Recent Labs   Lab 07/28/20 2054   WBC 10.7   HGB 11.9   MCV 87      INR 1.11      POTASSIUM 3.5   CHLORIDE 111*   CO2 23   BUN 8   CR 0.76   ANIONGAP 6   KELBY 8.7   GLC 98     Recent Results (from the past 24 hour(s))   XR Abdomen 2 Views   Result Value    Radiologist flags Possible ingested foreign body in the thorax (Urgent)    Narrative    Exam: XR ABDOMEN 2 VW, 7/28/2020 7:34 PM    Indication: swallowed pen spring    Comparison: Abdominal radiograph 7/11/2020    Findings: Upright AP and lateral radiographs of the abdomen. There is  a linear  thin wire-like radiodensity projecting over the distal  esophagus. Partially visualized central venous catheter tip  terminating over the right atrium. The radiopaque object present  previously overlying the epigastrium on prior abdominal radiograph is  no longer identified. Nondistended bowel gas pattern. Multiple  air-fluid levels in the right abdomen, nonspecific. Chronic elevation  of the right hemidiaphragm.      Impression    Impression:   1. Linear, thin wire-like radiodensity projecting over the distal  esophagus, may represent described straightened out spring.  2. Scattered air-fluid levels in the right abdomen, nonspecific.     [Urgent Result: Possible ingested foreign body in the thorax]    Finding was identified on 7/28/2020 7:48 PM.     Dr. Diego  was contacted by Dr. Yousif at 7/28/2020 8:00 PM and  verbalized understanding of the urgent finding.        I have personally reviewed the examination and initial interpretation  and I agree with the findings.    ROLANDA MCCLAIN MD   XR Abdomen Port 1 View    Narrative    Exam: XR ABDOMEN PORT 1 VW, 7/29/2020 8:27 AM    Indication: locate pin    Comparison: Abdomen 7/28/2020    Findings:   Supine portable abdominal radiographs. The linear radiodensity present  previously overlying the mediastinum is now demonstrated is the lower  mediastinum extending into the proximal stomach. No additional  radiopaque foreign bodies identified in the field-of-view. The pelvis  has not been included. Nonobstructive bowel gas pattern. Assessment  for free air is limited on supine imaging. Osseous structures stable  with scoliosis noted.    Tip of a central venous catheter in the thorax present. Stable  elevation of the right hemidiaphragm.      Impression    Impression: The ingested radiopaque structure present previously in  the mediastinum has moved distally, now appears to be entering the  stomach. Assessment for perforation/free air limited on supine  abdominal  radiographs.    ROLANDA MCCLAIN MD

## 2020-07-29 NOTE — PROGRESS NOTES
Emergency Social Work Services Note    Date of  Intervention: 07/29/20  Last Emergency Department Visit:  Pt has been in the ED since yesterday.    Care Plan:  Yes  Collaborated with:  EBENEZER Jimneez in the ED, [phone]; CARMEN Trotter; patient; Clara, Mental Health  with [facility], [phone]    Data:  ED SW paged with request to update pt's mental health .  Pt is reportedly OK with ALLAN communicating with Clara.    Intervention:  Chart reviewed.  ALLAN called and spoke with Rose Marie and Barbara and Barbara helped arrange an IPad so writer could conduct a virtual visit with pt.  Pt confirms above, giving verbal permission for writer to communicate with Clara.   Pt requested a Word Search magazine and crayons.  She states that the MD told her that crayons are OK, but she cannot have pens or pencils.    ALLAN called and spoke with CARMEN Trotter.  Perhaps a marker without the cap would be OK; no crayons available.    ALLAN text-paged Dr. Hart with the above request but did not hear back.  ALLAN spoke with CARMEN Trotter, who states that she spoke with Dr. Hart and team is OK with word search magazine with crayons or marker without cap.      SW attempted to meet with pt but GI meeting with her now.  SW dropped off the Word Search magazine and markers with the pt's 1:1 sitter.     ALLAN called and spoke with Clara.  Clara states that pt has a long history of ingesting items but that pt was recently doing very well and went ~4 months without any ingestions.  She states that pt's ingestions are now escalating again and cites multiple hospitalizations and ED presentations in the last 4 weeks.    They had a team meeting recently reviewing pt's OP supports.  She states that pt has access to 24-hr emergency supports but that pt has not seemed to reach out to these supports.  (Writer notes that in today's Psychiatric note, pt states 'she tried calling her safety contacts but that they did not get back to her in time'.)   Clara states that pt met with her ILS worker for 5 hours yesterday and still ended up swallowing the pen spring.      Pt currently attends a DBT program.  This program is meeting virtually currently due to COVID precautions.      Pt recently had a guardian (it was an emergency guardianship) that  at the beginning of COVID.  Clara noticed that pt seemed to be more successful when she had a guardian so OP team will continue to review if guardian is needed again.    Clara questions if pt requires in-patient mental health admission at this time.    Writer will call and update Clara after Psychiatry consult.    1205pm:  Psychiatry consulted and recommended return home with OP supports.  Pt was able to move up her Psychiatric appt to 20 and was suggested to utilize 24/7 services and 'warm line' service.  Writer called and updated Clara.  She states she just spoke with the pt and is aware of above.  Pt also mentioned to Clara that she attempted to reach out to several supports prior to ingesting and these included Clara (though this was afterhours), pt's hairdresser, and pt's therapist.  Clara states that another possible idea is for pt and ILS worker to make a written sign that is hung up in pt's house that identifies three 24/7 emergency lines, ie county crisis line, to call when pt is feeling self-injurious (rather than try to call her team afterhours or her hairdresser).      Clara would like to be updated of pt's discharge date.    Assessment:  Care coordination.    Plan:    Anticipated Disposition:  Home with services    Barriers to d/c plan:  Medical stability.  Pt currently in surgery.    Follow Up:  SW will continue to follow.    DENIS Warner  Social Work Services  Emergency Department & 6D  [phone] phone  [phone] pager  9:00am-9:30pm Mon-Sat    On-call pager, [phone], 4:00pm to midnight

## 2020-07-29 NOTE — ED NOTES
[facility], [place]   ED Nurse to Floor Handoff     Nevin Alvarado is a 29 year old female who speaks English and lives alone,  in a home  They arrived in the ED by ambulance from home    ED Chief Complaint: Swallowed Foreign Body    ED Dx;   Final diagnoses:   Foreign body in esophagus, initial encounter         Needed?: No    Allergies:   Allergies   Allergen Reactions     Amoxicillin-Pot Clavulanate Other (See Comments), Swelling and Rash     PN: facial swelling, left side. Also had cortisone injection the same day as she started the Augmentin.  Noted in downtime recovery of chart.       Penicillins Anaphylaxis     Vancomycin Itching, Swelling and Rash     Other reaction(s): Redness  Flushed face, very itchy; HUT Comment: Flushed face, very itchy; HUT Reaction: Angioedema; HUT Reaction: Redness; HUT Severity: Med; HUT Noted: [id number]       Hydrocodone Nausea and Vomiting and GI Disturbance     vomiting for days, PN: vomiting for days; HUT Comment: vomiting for days; HUT Reaction: Gastrointestinal; HUT Reaction: Nausea And Vomiting; HUT Reaction Type: Intolerance; HUT Severity: Med; HUT Noted: [id number]  vomiting for days       Blood-Group Specific Substance Other (See Comments)     Patient has an anti-Cw and non-specific antibodies. Blood product orders may be delayed. Draw one red top and two purple top tubes for all type/screen/crossmatch orders.     Influenza Vaccines Other (See Comments)     Oseltamivir Hives     med stopped, PN: med stopped     Cephalosporins Rash     Lamotrigine Rash     Possibly associated with Lamictal.   HUT Comment: Possibly associated with Lamictal. ; HUT Reaction: Rash; HUT Reaction Type: Allergy; HUT Severity: Low; HUT Noted: [id number]     Latex Rash   .  Past Medical Hx:   Past Medical History:   Diagnosis Date     ADD (attention deficit disorder)      Anorexia nervosa with bulimia     history of; on Topamax     Anxiety      Borderline  personality disorder (H)      Depression      H/O self-harm      History of pulmonary embolism 12/2019    Provoked. Completed 3 month course of Apixaban     Morbid obesity (H)      Neuropathy      PTSD (post-traumatic stress disorder)      Rectal foreign body - Recurrent issue, self placed      Swallowed foreign body - Recurrent issue, self placed       Baseline Mental status: WDL and disability from metal health issues  Current Mental Status changes: at basesline    Infection present or suspected this encounter: no  Sepsis suspected: No  Isolation type: No active isolations     Activity level - Baseline/Home:  Independent  Activity Level - Current:   Independent    Bariatric equipment needed?: No    In the ED these meds were given:   Medications   0.9% sodium chloride BOLUS (1,000 mLs Intravenous New Bag 7/28/20 2118)     Followed by   sodium chloride 0.9% infusion ( Intravenous Not Given 7/28/20 2240)   pantoprazole (PROTONIX) 40 mg IV push injection (40 mg Intravenous Given 7/28/20 2119)   HYDROmorphone (DILAUDID) injection 0.2 mg (0.2 mg Intravenous Given 7/28/20 2229)   ketorolac (TORADOL) injection 30 mg (30 mg Intramuscular Given 7/28/20 1948)       Drips running?  No    Home pump  No    Current LDAs  Port A Cath Single 07/11/20 Right Chest wall (Active)   Access Date 07/28/20 07/28/20 2100   Access Attempts 1 07/28/20 2100   Gauge 20 gauge;3/4 inch 07/28/20 2100   Site Assessment WDL 07/28/20 2100   Line Status Saline locked 07/28/20 2100   Extravasation? No 07/28/20 2100   Dressing Intervention Transparent 07/28/20 2100   Number of days: 17       Labs results:   Labs Ordered and Resulted from Time of ED Arrival Up to the Time of Departure from the ED   BASIC METABOLIC PANEL - Abnormal; Notable for the following components:       Result Value    Chloride 111 (*)     All other components within normal limits   PARTIAL THROMBOPLASTIN TIME - Abnormal; Notable for the following components:    PTT 46 (*)     All  "other components within normal limits   DRUG ABUSE SCREEN 6 CHEM DEP URINE ([facility]) - Abnormal; Notable for the following components:    Benzodiazepine Qual Urine Positive (*)     All other components within normal limits   CBC WITH PLATELETS DIFFERENTIAL   INR   ALCOHOL ETHYL   HCG QUALITATIVE URINE   COVID-19 VIRUS (CORONAVIRUS) BY PCR   PERIPHERAL IV CATHETER       Imaging Studies:   Recent Results (from the past 24 hour(s))   XR Abdomen 2 Views   Result Value    Radiologist flags Possible ingested foreign body in the thorax (Urgent)    Narrative    Exam: XR ABDOMEN 2 VW, 7/28/2020 7:34 PM    Indication: swallowed pen spring    Comparison: Abdominal radiograph 7/11/2020    Findings: Upright AP and lateral radiographs of the abdomen. There is  a linear thin wire-like radiodensity projecting over the distal  esophagus. Partially visualized central venous catheter tip  terminating over the right atrium. The radiopaque object present  previously overlying the epigastrium on prior abdominal radiograph is  no longer identified. Nondistended bowel gas pattern. Multiple  air-fluid levels in the right abdomen, nonspecific. Chronic elevation  of the right hemidiaphragm.      Impression    Impression:   1. Linear, thin wire-like radiodensity projecting over the distal  esophagus, may represent described straightened out spring.  2. Scattered air-fluid levels in the right abdomen, nonspecific.     [Urgent Result: Possible ingested foreign body in the thorax]    Finding was identified on 7/28/2020 7:48 PM.     Dr. Diego  was contacted by Dr. Yousif at 7/28/2020 8:00 PM and  verbalized understanding of the urgent finding.        I have personally reviewed the examination and initial interpretation  and I agree with the findings.    ROLANDA MCCLAIN MD       Recent vital signs:   BP (!) 143/86   Pulse 90   Temp 99.1  F (37.3  C) (Oral)   Resp 16   Ht 1.575 m (5' 2\")   Wt 115.7 kg (255 lb)   SpO2 97%   BMI 46.64 kg/m  "     Cuney Coma Scale Score: 15 (07/28/20 6159)       Cardiac Rhythm: Normal Sinus  Pt needs tele? No  Skin/wound Issues: None    Code Status: Full Code    Pain control: fair    Nausea control: pt had none    Abnormal labs/tests/findings requiring intervention: see epic    Family present during ED course? No   Family Comments/Social Situation comments:     Tasks needing completion: None    Lila Zimmer, RN  [phone] [facility]

## 2020-07-29 NOTE — PLAN OF CARE
"Seen by GI: Yes  Foreign body removed: Yes    /85   Pulse 88   Temp 97.8  F (36.6  C) (Oral)   Resp 18   Ht 1.575 m (5' 2\")   Wt 115.7 kg (255 lb)   SpO2 97%   BMI 46.64 kg/m            "

## 2020-07-29 NOTE — ANESTHESIA PREPROCEDURE EVALUATION
Anesthesia Pre-Procedure Evaluation    Patient: Nevin Alvarado   MRN:     [id number] Gender:   female   Age:    29 year old :      1990        Preoperative Diagnosis: History of esophagogastroduodenoscopy [Z98.890]   Procedure(s):  ESOPHAGOGASTRODUODENOSCOPY (EGD)     LABS:  CBC:   Lab Results   Component Value Date    WBC 10.7 2020    WBC 10.5 2020    HGB 11.9 2020    HGB 12.3 2020    HCT 37.1 2020    HCT 38.8 2020     2020     2020     BMP:   Lab Results   Component Value Date     2020     2020    POTASSIUM 3.5 2020    POTASSIUM 3.4 2020    CHLORIDE 111 (H) 2020    CHLORIDE 109 2020    CO2 23 2020    CO2 24 2020    BUN 8 2020    BUN 9 2020    CR 0.76 2020    CR 0.62 2020    GLC 98 2020    GLC 94 2020     COAGS:   Lab Results   Component Value Date    PTT 46 (H) 2020    INR 1.11 2020     POC:   Lab Results   Component Value Date    BGM 92 2020    HCG Negative 2020    HCGS Negative 2019     OTHER:   Lab Results   Component Value Date    LACT 1.0 2019    KELBY 8.7 2020    PHOS 3.5 2019    MAG 1.9 2019    ALBUMIN 3.5 2020    PROTTOTAL 8.8 2020    ALT 31 2020    AST 20 2020    ALKPHOS 86 2020    BILITOTAL 0.3 2020    LIPASE 146 02/15/2020    TSH 4.56 (H) 02/15/2020    T4 0.78 02/15/2020    CRP 6.6 2019    SED 26 (H) 2017        Preop Vitals    BP Readings from Last 3 Encounters:   20 (!) 145/117   20 125/81   20 (!) 128/97    Pulse Readings from Last 3 Encounters:   20 76   20 74   20 88      Resp Readings from Last 3 Encounters:   20 18   20 16   20 18    SpO2 Readings from Last 3 Encounters:   20 99%   20 97%   20 98%      Temp Readings from Last 1 Encounters:   20 36.7  " C (98.1  F) (Oral)    Ht Readings from Last 1 Encounters:   07/28/20 1.575 m (5' 2\")      Wt Readings from Last 1 Encounters:   07/28/20 115.7 kg (255 lb)    Estimated body mass index is 46.64 kg/m  as calculated from the following:    Height as of this encounter: 1.575 m (5' 2\").    Weight as of this encounter: 115.7 kg (255 lb).     LDA:  Port A Cath Single 07/11/20 Right Chest wall (Active)   Access Date 07/28/20 07/28/20 2100   Access Attempts 1 07/28/20 2100   Gauge 20 gauge;3/4 inch 07/28/20 2100   Site Assessment WDL 07/28/20 2100   Line Status Saline locked 07/28/20 2100   Extravasation? No 07/28/20 2100   Dressing Intervention Transparent 07/28/20 2100   Number of days: 18       ETT (Active)   Number of days: 0        Past Medical History:   Diagnosis Date     ADD (attention deficit disorder)      Anorexia nervosa with bulimia     history of; on Topamax     Anxiety      Borderline personality disorder (H)      Depression      H/O self-harm      History of pulmonary embolism 12/2019    Provoked. Completed 3 month course of Apixaban     Morbid obesity (H)      Neuropathy      PTSD (post-traumatic stress disorder)      Rectal foreign body - Recurrent issue, self placed      Swallowed foreign body - Recurrent issue, self placed       Past Surgical History:   Procedure Laterality Date     COMBINED ESOPHAGOSCOPY, GASTROSCOPY, DUODENOSCOPY (EGD), REPLACE ESOPHAGEAL STENT N/A 10/9/2019    Procedure: Upper Endoscopy with Suture Placement;  Surgeon: Zurdo Ramirez MD;  [facility]: [facility]     ESOPHAGOSCOPY, GASTROSCOPY, DUODENOSCOPY (EGD), COMBINED N/A 3/9/2017    Procedure: COMBINED ESOPHAGOSCOPY, GASTROSCOPY, DUODENOSCOPY (EGD), REMOVE FOREIGN BODY;  Surgeon: Avis Guzmán MD;  [facility]: [facility]     ESOPHAGOSCOPY, GASTROSCOPY, DUODENOSCOPY (EGD), COMBINED N/A 4/20/2017    Procedure: COMBINED ESOPHAGOSCOPY, GASTROSCOPY, DUODENOSCOPY (EGD), REMOVE FOREIGN BODY;  EGD removal Foregin body;  Surgeon: " Lokesh Paula MD;  Location: [facility]     ESOPHAGOSCOPY, GASTROSCOPY, DUODENOSCOPY (EGD), COMBINED N/A 6/12/2017    Procedure: COMBINED ESOPHAGOSCOPY, GASTROSCOPY, DUODENOSCOPY (EGD);  COMBINED ESOPHAGOSCOPY, GASTROSCOPY, DUODENOSCOPY (EGD) [[id number]]attempted removal of foreign body;  Surgeon: Pamela Perez MD;  [facility]     ESOPHAGOSCOPY, GASTROSCOPY, DUODENOSCOPY (EGD), COMBINED N/A 6/9/2017    Procedure: COMBINED ESOPHAGOSCOPY, GASTROSCOPY, DUODENOSCOPY (EGD), REMOVE FOREIGN BODY;  Esophagoscopy, Gastroscopy, Duodenoscopy, Removal of Foreign Body;  Surgeon: Dejon Marsh MD;  [facility]: [facility]     ESOPHAGOSCOPY, GASTROSCOPY, DUODENOSCOPY (EGD), COMBINED N/A 1/6/2018    Procedure: COMBINED ESOPHAGOSCOPY, GASTROSCOPY, DUODENOSCOPY (EGD), REMOVE FOREIGN BODY;  COMBINED ESOPHAGOSCOPY, GASTROSCOPY, DUODENOSCOPY (EGD) [by pascal net and snare with profol sedation;  Surgeon: Feliciano Emmanuel MD;  Location:  GI     ESOPHAGOSCOPY, GASTROSCOPY, DUODENOSCOPY (EGD), COMBINED N/A 3/19/2018    Procedure: COMBINED ESOPHAGOSCOPY, GASTROSCOPY, DUODENOSCOPY (EGD), REMOVE FOREIGN BODY;   Esophagodscopy, Gastroscopy, Duodenoscopy,Foreign Body Removal;  Surgeon: Brice Guzmán MD;  [facility]     ESOPHAGOSCOPY, GASTROSCOPY, DUODENOSCOPY (EGD), COMBINED N/A 4/16/2018    Procedure: COMBINED ESOPHAGOSCOPY, GASTROSCOPY, DUODENOSCOPY (EGD), REMOVE FOREIGN BODY;  Esophagogastroduodenoscopy  Foreign Body Removal X 2;  Surgeon: Royer Moise MD;  [facility]: [facility]     ESOPHAGOSCOPY, GASTROSCOPY, DUODENOSCOPY (EGD), COMBINED N/A 6/1/2018    Procedure: COMBINED ESOPHAGOSCOPY, GASTROSCOPY, DUODENOSCOPY (EGD), REMOVE FOREIGN BODY;  COMBINED ESOPHAGOSCOPY, GASTROSCOPY, DUODENOSCOPY with removal of foreign body, propofol sedation by anesthesia;  Surgeon: Brice Martinez MD;  Location:  GI     ESOPHAGOSCOPY, GASTROSCOPY, DUODENOSCOPY (EGD), COMBINED N/A 7/25/2018    Procedure:  COMBINED ESOPHAGOSCOPY, GASTROSCOPY, DUODENOSCOPY (EGD), REMOVE FOREIGN BODY;;  Surgeon: Candy Castelan MD;  Location: [facility]     ESOPHAGOSCOPY, GASTROSCOPY, DUODENOSCOPY (EGD), COMBINED N/A 7/28/2018    Procedure: COMBINED ESOPHAGOSCOPY, GASTROSCOPY, DUODENOSCOPY (EGD), REMOVE FOREIGN BODY;  COMBINED ESOPHAGOSCOPY, GASTROSCOPY, DUODENOSCOPY (EGD), REMOVE FOREIGN BODY;  Surgeon: Brice Guzmán MD;  [facility]     ESOPHAGOSCOPY, GASTROSCOPY, DUODENOSCOPY (EGD), COMBINED N/A 7/31/2018    Procedure: COMBINED ESOPHAGOSCOPY, GASTROSCOPY, DUODENOSCOPY (EGD);  COMBINED ESOPHAGOSCOPY, GASTROSCOPY, DUODENOSCOPY (EGD) TO REMOVE FOREIGN BODY;  Surgeon: Lokesh Paula MD;  [facility]     ESOPHAGOSCOPY, GASTROSCOPY, DUODENOSCOPY (EGD), COMBINED N/A 8/4/2018    Procedure: COMBINED ESOPHAGOSCOPY, GASTROSCOPY, DUODENOSCOPY (EGD), REMOVE FOREIGN BODY;   combined esophagoscopy, gastroscopy, duodenoscopy, REMOVE FOREIGN BODY ;  Surgeon: Lokesh Paula MD;  [facility]     ESOPHAGOSCOPY, GASTROSCOPY, DUODENOSCOPY (EGD), COMBINED N/A 10/6/2019    Procedure: ESOPHAGOGASTRODUODENOSCOPY (EGD) with fireign body removal x2, esophageal stent placement with floroscopy;  Surgeon: Timoteo Espana MD;  [facility]     ESOPHAGOSCOPY, GASTROSCOPY, DUODENOSCOPY (EGD), COMBINED N/A 12/2/2019    Procedure: Esophagogastroduodenoscopy with esophageal stent removal, esophogram;  Surgeon: Kailee Tena MD;  [facility]     ESOPHAGOSCOPY, GASTROSCOPY, DUODENOSCOPY (EGD), COMBINED N/A 12/17/2019    Procedure: ESOPHAGOGASTRODUODENOSCOPY, WITH FOREIGN BODY REMOVAL;  Surgeon: Pamela Perez MD;  [facility]     ESOPHAGOSCOPY, GASTROSCOPY, DUODENOSCOPY (EGD), COMBINED N/A 12/13/2019    Procedure: ESOPHAGOGASTRODUODENOSCOPY, WITH FOREIGN BODY REMOVAL;  Surgeon: Samia Stanton MD;  [facility]     ESOPHAGOSCOPY, GASTROSCOPY, DUODENOSCOPY (EGD), COMBINED N/A 12/28/2019    Procedure:  ESOPHAGOGASTRODUODENOSCOPY (EGD) Removal of Foreign Body X 2;  Surgeon: Huy Kelley MD;  [facility]     ESOPHAGOSCOPY, GASTROSCOPY, DUODENOSCOPY (EGD), COMBINED N/A 1/5/2020    Procedure: ESOPHAGOGASTRODUOENOSCOPY WITH FOREIGN BODY REMOVAL;  Surgeon: Pamela Perez MD;  [facility]     ESOPHAGOSCOPY, GASTROSCOPY, DUODENOSCOPY (EGD), COMBINED N/A 1/3/2020    Procedure: ESOPHAGOGASTRODUODENOSCOPY (EGD) REMOVAL OF FOREIGN BODY.;  Surgeon: Pamela Perez MD;  [facility]     ESOPHAGOSCOPY, GASTROSCOPY, DUODENOSCOPY (EGD), COMBINED N/A 1/13/2020    Procedure: ESOPHAGOGASTRODUODENOSCOPY (EGD) for foreign body removal;  Surgeon: Lokesh Paula MD;  [facility]     ESOPHAGOSCOPY, GASTROSCOPY, DUODENOSCOPY (EGD), COMBINED N/A 1/18/2020    Procedure: Diagnostic ESOPHAGOGASTRODUODENOSCOPY (EGD;  Surgeon: Lokesh Paula MD;  [facility]     ESOPHAGOSCOPY, GASTROSCOPY, DUODENOSCOPY (EGD), COMBINED N/A 3/29/2020    Procedure: UPPER ENDOSCOPY WITH FOREIGN BODY REMOVAL;  Surgeon: Doug Hansen MD;  [facility]     ESOPHAGOSCOPY, GASTROSCOPY, DUODENOSCOPY (EGD), COMBINED N/A 7/11/2020    Procedure: ESOPHAGOGASTRODUODENOSCOPY (EGD); Upper Endoscopy WITH FOREIGN BODY REMOVAL;  Surgeon: Lyndsey Mendoza DO;  [facility]     EXAM UNDER ANESTHESIA ANUS N/A 1/10/2017    Procedure: EXAM UNDER ANESTHESIA ANUS;  Surgeon: Annmarie Haynes MD;  [facility]     EXAM UNDER ANESTHESIA RECTUM N/A 7/19/2018    Procedure: EXAM UNDER ANESTHESIA RECTUM;  EXAM UNDER ANESTHESIA, REMOVAL OF RECTAL FOREIGN BODY;  Surgeon: Annmarie Haynes MD;  [facility]     HC REMOVE FECAL IMPACTION OR FB W ANESTHESIA N/A 12/18/2016    Procedure: REMOVE FECAL IMPACTION/FOREIGN BODY UNDER ANESTHESIA;  Surgeon: Soham Cano MD;  Location: [facility] OR     KNEE SURGERY - removed a small tissue mass from knee Right      LAPAROSCOPIC ABLATION ENDOMETRIOSIS        LAPAROTOMY EXPLORATORY N/A 1/10/2017    Procedure: LAPAROTOMY EXPLORATORY;  Surgeon: Annmarie Haynes MD;  [facility]     LAPAROTOMY EXPLORATORY  09/11/2019    Manual manipulation of bowel to remove pill bottle in rectum     lymph nodes removed from neck; benign  age 7     MAMMOPLASTY REDUCTION Bilateral      RELEASE CARPAL TUNNEL Bilateral      SIGMOIDOSCOPY FLEXIBLE N/A 1/10/2017    Procedure: SIGMOIDOSCOPY FLEXIBLE;  Surgeon: Annmarie Haynes MD;  [facility]     SIGMOIDOSCOPY FLEXIBLE N/A 5/8/2018    Procedure: SIGMOIDOSCOPY FLEXIBLE;  flex sig with foreign body removal using snare and rattooth forcep;  Surgeon: Soham Cano MD;  Location: [facility]     SIGMOIDOSCOPY FLEXIBLE N/A 2/20/2019    Procedure: Exam under anesthesia Colonoscopy with attempted  removal of rectal foreign body;  Surgeon: Estrada Chávez MD;  [facility]      Allergies   Allergen Reactions     Amoxicillin-Pot Clavulanate Other (See Comments), Swelling and Rash     PN: facial swelling, left side. Also had cortisone injection the same day as she started the Augmentin.  Noted in downtime recovery of chart.       Penicillins Anaphylaxis     Vancomycin Itching, Swelling and Rash     Other reaction(s): Redness  Flushed face, very itchy; HUT Comment: Flushed face, very itchy; HUT Reaction: Angioedema; HUT Reaction: Redness; HUT Severity: Med; HUT Noted: [id number]       Hydrocodone Nausea and Vomiting and GI Disturbance     vomiting for days, PN: vomiting for days; HUT Comment: vomiting for days; HUT Reaction: Gastrointestinal; HUT Reaction: Nausea And Vomiting; HUT Reaction Type: Intolerance; HUT Severity: Med; HUT Noted: [id number]  vomiting for days       Blood-Group Specific Substance Other (See Comments)     Patient has an anti-Cw and non-specific antibodies. Blood product orders may be delayed. Draw one red top and two purple top tubes for all type/screen/crossmatch orders.     Influenza Vaccines Other (See  Comments)     Oseltamivir Hives     med stopped, PN: med stopped     Cephalosporins Rash     Lamotrigine Rash     Possibly associated with Lamictal.   HUT Comment: Possibly associated with Lamictal. ; HUT Reaction: Rash; HUT Reaction Type: Allergy; HUT Severity: Low; HUT Noted: [id number]     Latex Rash        Anesthesia Evaluation     . Pt has had prior anesthetic.     No history of anesthetic complications          ROS/MED HX    ENT/Pulmonary:       Neurologic:       Cardiovascular:         METS/Exercise Tolerance:     Hematologic:     (+) History of blood clots -      Musculoskeletal:         GI/Hepatic: Comment: Foreign body ingestion    (+) GERD       Renal/Genitourinary:      (-) renal disease   Endo:     (+) Obesity, .      Psychiatric:     (+) psychiatric history anxiety, depression, other (comment) and bipolar      Infectious Disease:         Malignancy:         Other:                         PHYSICAL EXAM:   Mental Status/Neuro: A/A/O   Airway: Facies: Feasible  Mallampati: II  Mouth/Opening: Full  TM distance: > 6 cm  Neck ROM: Full   Respiratory: Auscultation: CTAB     Resp. Rate: Normal     Resp. Effort: Normal      CV: Rhythm: Regular  Rate: Age appropriate  Heart: Normal Sounds  Edema: None   Comments:      Dental: Normal Dentition                Assessment:   ASA SCORE: 2    H&P: History and physical reviewed and following examination; no interval change.    NPO Status: NPO Appropriate     Plan:   Anes. Type:  General   Pre-Medication: None   Induction:  IV (RSI)   Airway: ETT; Oral   Access/Monitoring: PIV   Maintenance: Balanced     Postop Plan:   Postop Pain: Opioids  Postop Sedation/Airway: Not planned     PONV Management:   Adult Risk Factors: Female, Postop Opioids   Prevention:, Dexamethasone     CONSENT: Direct conversation   Plan and risks discussed with: Patient                      Carli Estrada MD

## 2020-07-29 NOTE — OR NURSING
Dr. Estrada at bedside in PACU.  Notified by writing RN patient c/o full body itching and feels it is caused by Decadron given in OR.  Dr. Estrada stated she will enter order for benadryl.

## 2020-07-30 ENCOUNTER — PATIENT OUTREACH (OUTPATIENT)
Dept: CARE COORDINATION | Facility: CLINIC | Age: 30
End: 2020-07-30

## 2020-07-30 VITALS
OXYGEN SATURATION: 98 % | SYSTOLIC BLOOD PRESSURE: 121 MMHG | RESPIRATION RATE: 18 BRPM | WEIGHT: 255 LBS | DIASTOLIC BLOOD PRESSURE: 83 MMHG | HEART RATE: 68 BPM | TEMPERATURE: 97.7 F | HEIGHT: 62 IN | BODY MASS INDEX: 46.93 KG/M2

## 2020-07-30 LAB
SARS-COV-2 RNA SPEC QL NAA+PROBE: NOT DETECTED
SPECIMEN SOURCE: NORMAL

## 2020-07-30 PROCEDURE — C9113 INJ PANTOPRAZOLE SODIUM, VIA: HCPCS | Performed by: NURSE PRACTITIONER

## 2020-07-30 PROCEDURE — 25000128 H RX IP 250 OP 636: Performed by: NURSE PRACTITIONER

## 2020-07-30 PROCEDURE — 96376 TX/PRO/DX INJ SAME DRUG ADON: CPT

## 2020-07-30 PROCEDURE — 25000132 ZZH RX MED GY IP 250 OP 250 PS 637: Performed by: INTERNAL MEDICINE

## 2020-07-30 PROCEDURE — 25000132 ZZH RX MED GY IP 250 OP 250 PS 637: Performed by: NURSE PRACTITIONER

## 2020-07-30 PROCEDURE — G0378 HOSPITAL OBSERVATION PER HR: HCPCS

## 2020-07-30 RX ADMIN — VALACYCLOVIR HYDROCHLORIDE 2000 MG: 500 TABLET, FILM COATED ORAL at 07:56

## 2020-07-30 RX ADMIN — BENZOCAINE AND MENTHOL 1 LOZENGE: 15; 3.6 LOZENGE ORAL at 08:06

## 2020-07-30 RX ADMIN — DESVENLAFAXINE 100 MG: 50 TABLET, FILM COATED, EXTENDED RELEASE ORAL at 07:56

## 2020-07-30 RX ADMIN — BUSPIRONE HYDROCHLORIDE 10 MG: 10 TABLET ORAL at 07:56

## 2020-07-30 RX ADMIN — HYDROXYCHLOROQUINE SULFATE 200 MG: 200 TABLET, FILM COATED ORAL at 07:55

## 2020-07-30 RX ADMIN — PREGABALIN 50 MG: 50 CAPSULE ORAL at 07:56

## 2020-07-30 RX ADMIN — PANTOPRAZOLE SODIUM 40 MG: 40 INJECTION, POWDER, FOR SOLUTION INTRAVENOUS at 07:56

## 2020-07-30 RX ADMIN — CLONIDINE HYDROCHLORIDE 0.1 MG: 0.1 TABLET ORAL at 07:56

## 2020-07-30 RX ADMIN — Medication 5 ML: at 10:19

## 2020-07-30 NOTE — PROGRESS NOTES
Social Work Services Progress Note    Hospital Day: 3  Date of Initial Social Work Evaluation:  7/29/20  Collaborated with:  patient; Clara, Mental Health  with [employer], [phone]; 6D Charge RN     Data:  SW continuing to follow for discharge planning. Please see 7/29 ALLAN notes for additional details.     Intervention:  ALLAN contacted Mental Health  Clara to update her that Nevin remains ready for discharge today.      Clara states she spoke, at length, to Nevin's community support team yesterday including her therapist and ILS worker.  Clara has created a list of 6 appropriate options for Nevin to contact if she is needing emergency support in the evening hours.  Nevin's ILS worker will be meeting with her on 7/31 and the two of them will create a large poster of these after-hours options to hang on her wall.      Clara states Nevin's therapist (DBT program) questions what else Nevin is getting out of her self-harm behaviors leading to hospitalization after hours. Conversations will continue within her outpatient care team about her triggers and any unidentified motivating factors.  This SW encouraged Clara to continue to keep the ED SW team in the loop, as appropriate, for ways that we can be more supportive of Lisas needs and current goals during the times she presents to [facility].      At this time, Clara is not exploring the addition of guardianship to Nevin's care plan.  They are starting by ramping up her current supports with addition ILS staffing hours over the weekends,  hours and working on the above stated after-hours emergency plan.  If Nevin's pattern continues and all other options are exhausted, then guardianship will be reconsidered.     10am: ALLAN received page from [employer] MD stating Nevin is medically ready for discharge and in need of transport home.  ALLAN spoke to Nevin in her room who agreed to  support with transport.  Nevin reports no  additional questions or needs for SW support at this time.    [facility] discharge transportation arranged.  Clara notified that Nevin is returning home.      Addendum 11:15am: ALLAN received a call from Nevin stating that her ILS worker Edwige ([phone]) has offered to pick her up instead.  SW contacted Edwige to confirm and she will arrive within 15 mins.  ALLAN spoke to sitter waiting at hospital entrance with Nevin and gave him permission to leave.  [place]  Clara contacted to update re: discharge status.     Assessment:  ALLAN did not meet with Nevin for direct assessment.  Strong system of community mental health supports in place.    Plan:    Anticipated Disposition:  Home with services    Barriers to d/c plan:  None identified.    Follow Up:  ALLAN available to support, as needed.    YORDAN Oliveros, MSW  Social Work Services, 6D support   [facility]  Direct: [phone] Pager: [phone]

## 2020-07-30 NOTE — PLAN OF CARE
Pt adequate for discharge. Discharge paperwork went over with pt. All questions were answered. Port de-accessed. Pt to follow up with psychiatry in clinic. Pt left unit with attendant wheeling her to lobby. Cab will be picking her up

## 2020-07-30 NOTE — PLAN OF CARE
"Seen by GI: Yes  Foreign body removed: Yes    Pt reporting LUQ pain and sore throat, rest promoted. PRN lozenge given for throat. Pt sitting comfortably in bed. 1:1 in place. All objects removed from room besides pt's personal belongings. Call light within reach, able to make needs known. Will continue to monitor and follow POC.    BP (!) 137/92 (BP Location: Right arm)   Pulse 78   Temp 97.8  F (36.6  C) (Oral)   Resp 18   Ht 1.575 m (5' 2\")   Wt 115.7 kg (255 lb)   SpO2 97%   BMI 46.64 kg/m      "

## 2020-07-30 NOTE — PLAN OF CARE
Observation Goals Prior to Discharge:  Seen by GI: Met  Foreign body removed: Met    Pt sleeping throughout the night, sitter in room. Independently up to bathroom. Slept through assessment of heart, lungs, and abdomen. Denies pain. Will continue assessing in AM and will follow POC.

## 2020-07-30 NOTE — PROGRESS NOTES
Gastroenterology Inpatient Sign Off Note    Inpatient GI consults service will sign off. No further recommendations at this time. If primary team has addition questions, please page consult fellow listed in [facility].    Current GI Consult Staff: Dr Stanton     Follow up recommendations:   No outpatient GI clinic follow-up indicated. Follow-up with primary care provider at timing determined by discharge physician.    Sonam TINSLEY MD  Gastroenterology Fellow  Division of Gastroenterology, Hepatology and Nutrition  [facility]

## 2020-07-30 NOTE — PROVIDER NOTIFICATION
Gold paged re: Pt has port a cath with no fluids running. Pt needs heparin lock orders, thank you!

## 2020-07-30 NOTE — PROGRESS NOTES
Social Work Services Progress Note    Hospital Day: 2  Date of Initial Social Work Evaluation:  Not completed.  Collaborated with:  patient    Data:  Notified that pt requires a MOON form.    Intervention:  SW met with pt, introduced self, role and reason for the visit.  Also let her know that writer dropped off the Word Search earlier today since pt was busy in conversation with GI.  Writer educated pt about and gave the MOON form.  Pt signed the form; declined writer's request if she would like a copy of the form.    Pt asked writer to update her about my conversation with Clara, which writer did.  Informed her that Clara also wants to be updated about pt's discharge date and pt is OK with SW making this update.  Pt anticipates to be discharged tomorrow.  Pt voices frustration with her OP mental health team in that they have asked her what she needs to be more successful at home and pt has voiced more support in the evenings, but that her , therapist, etc are not available in the evenings.  She states that she knows they are recommending the 24/7 emergency crisis lines but that she has utilized them in the past and reports that the crisis team called 911 every time she called them.  Pt states that she lost her housing due to multiple visits from EMS.  She voices worry of calling 24/7 crisis line out of fear that they will call 911 each time she calls and that she will lose her housing again.  She states she has voiced her frustration to Clara and writer notes that pt was on the phone with Clara prior to our visit.    Pt also asks how to edit her [employer] page where it lists her out-patient care team (mental health , SW, CADI worker).  She states they all need to be updated because she has new workers.  Writer gave pt contact info for [employer] as well as stated that a provider may be able to assist.  (Writer uncertain how to edit [employer]).    Pt also states that she  used to have a guardian but that the guardianship  and she no longer has a guardian.  She wants to know if we have scanned in the guardianship expiration documents.  Writer searched pt's chart and informed pt that this document is scanned into her chart.    Writer faxed the JETT to [facility] to [phone] at .    Assessment:  Education and supportive listening.    Plan:    Anticipated Disposition:  Home with services.  Pt anticipates discharging tomorrow, .    Barriers to d/c plan:  Medical clearance.    Follow Up:  SW will follow.    DENIS Warner  Social Work Services  Emergency Department & 6D  [phone] phone  [phone] pager  8:00am-9:30pm Mon-Sat    On-call pager, [phone], 4:00pm to midnight

## 2020-07-30 NOTE — PLAN OF CARE
Observation Goals Prior to Discharge:  Seen by GI: Met  Foreign body removed: Met    Pt to discharge home today. SW to help with transportation

## 2020-07-31 LAB — UPPER GI ENDOSCOPY: NORMAL

## 2020-07-31 NOTE — PROGRESS NOTES
Patient was called three times and no answer so post 24 hr DC follow up calls will be closed out, message was left with contact number for department seen by or following up     Follow Up and recommended labs and tests  Follow-up with outpatient psychiatrist and therapist as previously planned.

## 2020-07-31 NOTE — PROGRESS NOTES
Attempted to contact the patient x2 for post-hospital call without answer. Will close encounter at this time.    Saskia Parsons CMA, [facility]  Post Hospital Discharge Team

## 2020-08-01 ENCOUNTER — ANESTHESIA (OUTPATIENT)
Dept: SURGERY | Facility: CLINIC | Age: 30
End: 2020-08-01
Payer: COMMERCIAL

## 2020-08-01 ENCOUNTER — ANESTHESIA EVENT (OUTPATIENT)
Dept: SURGERY | Facility: CLINIC | Age: 30
End: 2020-08-01
Payer: COMMERCIAL

## 2020-08-01 ENCOUNTER — APPOINTMENT (OUTPATIENT)
Dept: GENERAL RADIOLOGY | Facility: CLINIC | Age: 30
End: 2020-08-01
Attending: EMERGENCY MEDICINE
Payer: COMMERCIAL

## 2020-08-01 ENCOUNTER — HOSPITAL ENCOUNTER (OUTPATIENT)
Facility: CLINIC | Age: 30
Setting detail: OBSERVATION
Discharge: HOME | End: 2020-08-02
Attending: EMERGENCY MEDICINE | Admitting: INTERNAL MEDICINE
Payer: COMMERCIAL

## 2020-08-01 DIAGNOSIS — T18.2XXA FOREIGN BODY IN STOMACH, INITIAL ENCOUNTER: ICD-10-CM

## 2020-08-01 DIAGNOSIS — Z20.822 COVID-19 VIRUS NOT DETECTED: ICD-10-CM

## 2020-08-01 DIAGNOSIS — T18.9XXA SWALLOWED FOREIGN BODY, INITIAL ENCOUNTER: ICD-10-CM

## 2020-08-01 LAB
LABORATORY COMMENT REPORT: NORMAL
SARS-COV-2 RNA SPEC QL NAA+PROBE: NEGATIVE
SARS-COV-2 RNA SPEC QL NAA+PROBE: NORMAL
SPECIMEN SOURCE: NORMAL
SPECIMEN SOURCE: NORMAL

## 2020-08-01 PROCEDURE — 99285 EMERGENCY DEPT VISIT HI MDM: CPT | Performed by: EMERGENCY MEDICINE

## 2020-08-01 PROCEDURE — 99226 ZZC SUBSEQUENT OBSERVATION CARE,LEVEL III: CPT | Performed by: INTERNAL MEDICINE

## 2020-08-01 PROCEDURE — 86901 BLOOD TYPING SEROLOGIC RH(D): CPT | Performed by: EMERGENCY MEDICINE

## 2020-08-01 PROCEDURE — 86900 BLOOD TYPING SEROLOGIC ABO: CPT | Performed by: EMERGENCY MEDICINE

## 2020-08-01 PROCEDURE — 25000132 ZZH RX MED GY IP 250 OP 250 PS 637: Performed by: EMERGENCY MEDICINE

## 2020-08-01 PROCEDURE — 99207 ZZC APP CREDIT; MD BILLING SHARED VISIT: CPT | Performed by: NURSE PRACTITIONER

## 2020-08-01 PROCEDURE — C9803 HOPD COVID-19 SPEC COLLECT: HCPCS | Performed by: EMERGENCY MEDICINE

## 2020-08-01 PROCEDURE — 86850 RBC ANTIBODY SCREEN: CPT | Performed by: EMERGENCY MEDICINE

## 2020-08-01 PROCEDURE — 99207 ZZC CDG-HISTORY COMP: MEETS EXP. PROBLEM FOCUSED-DOWN CODED LACK OF ROS: CPT | Performed by: INTERNAL MEDICINE

## 2020-08-01 PROCEDURE — 74018 RADEX ABDOMEN 1 VIEW: CPT

## 2020-08-01 PROCEDURE — 99285 EMERGENCY DEPT VISIT HI MDM: CPT | Mod: Z6 | Performed by: EMERGENCY MEDICINE

## 2020-08-01 PROCEDURE — U0003 INFECTIOUS AGENT DETECTION BY NUCLEIC ACID (DNA OR RNA); SEVERE ACUTE RESPIRATORY SYNDROME CORONAVIRUS 2 (SARS-COV-2) (CORONAVIRUS DISEASE [COVID-19]), AMPLIFIED PROBE TECHNIQUE, MAKING USE OF HIGH THROUGHPUT TECHNOLOGIES AS DESCRIBED BY CMS-2020-01-R: HCPCS | Performed by: NURSE PRACTITIONER

## 2020-08-01 RX ORDER — LIDOCAINE 40 MG/G
CREAM TOPICAL
Status: CANCELLED | OUTPATIENT
Start: 2020-08-01

## 2020-08-01 RX ORDER — ACETAMINOPHEN 325 MG/1
975 TABLET ORAL ONCE
Status: COMPLETED | OUTPATIENT
Start: 2020-08-01 | End: 2020-08-01

## 2020-08-01 RX ADMIN — ACETAMINOPHEN 975 MG: 325 TABLET, FILM COATED ORAL at 22:06

## 2020-08-01 ASSESSMENT — MIFFLIN-ST. JEOR: SCORE: 1839.92

## 2020-08-01 NOTE — ED TRIAGE NOTES
"Triage Assessment & Note:    BP (!) 125/97   Pulse 96   Temp 98.5  F (36.9  C) (Oral)   Resp 16   Ht 1.575 m (5' 2\")   Wt 115.7 kg (255 lb)   SpO2 95%   BMI 46.64 kg/m        Patient presents with: Pt BIBA from home after swallowing a paperclip at 1800 and now has abdominal pain. No reports of fever, cough, SOB, CP, travel, or wanting to hurt herself.     Home Treatments/Remedies: Home medication    Febrile / Afebrile: afebrile    Duration of C/o: <1 hrs    Flower Johnson RN  August 1, 2020        "

## 2020-08-01 NOTE — ED NOTES
Bed: ED09  Expected date:   Expected time: 6:50 PM  Means of arrival: Ambulance  Comments:  [facility]    29F with abdominal pain that swallowed a paper clip

## 2020-08-02 ENCOUNTER — PATIENT OUTREACH (OUTPATIENT)
Dept: CARE COORDINATION | Facility: CLINIC | Age: 30
End: 2020-08-02

## 2020-08-02 VITALS
RESPIRATION RATE: 18 BRPM | DIASTOLIC BLOOD PRESSURE: 91 MMHG | OXYGEN SATURATION: 97 % | SYSTOLIC BLOOD PRESSURE: 136 MMHG | BODY MASS INDEX: 46.93 KG/M2 | TEMPERATURE: 97.4 F | HEIGHT: 62 IN | WEIGHT: 255 LBS | HEART RATE: 85 BPM

## 2020-08-02 LAB
ABO + RH BLD: ABNORMAL
ABO + RH BLD: ABNORMAL
BLD GP AB SCN SERPL QL: ABNORMAL
BLOOD BANK CMNT PATIENT-IMP: ABNORMAL
BLOOD BANK CMNT PATIENT-IMP: ABNORMAL
SPECIMEN EXP DATE BLD: ABNORMAL
UPPER GI ENDOSCOPY: NORMAL

## 2020-08-02 PROCEDURE — 37000008 ZZH ANESTHESIA TECHNICAL FEE, 1ST 30 MIN: Performed by: INTERNAL MEDICINE

## 2020-08-02 PROCEDURE — 99207 ZZC CDG-CODE CATEGORY CHANGED: CPT | Performed by: INTERNAL MEDICINE

## 2020-08-02 PROCEDURE — 25000132 ZZH RX MED GY IP 250 OP 250 PS 637: Performed by: NURSE PRACTITIONER

## 2020-08-02 PROCEDURE — 27210794 ZZH OR GENERAL SUPPLY STERILE: Performed by: INTERNAL MEDICINE

## 2020-08-02 PROCEDURE — G0378 HOSPITAL OBSERVATION PER HR: HCPCS

## 2020-08-02 PROCEDURE — 99217 ZZC OBSERVATION CARE DISCHARGE: CPT | Performed by: INTERNAL MEDICINE

## 2020-08-02 PROCEDURE — 25000128 H RX IP 250 OP 636: Performed by: NURSE ANESTHETIST, CERTIFIED REGISTERED

## 2020-08-02 PROCEDURE — 36000053 ZZH SURGERY LEVEL 2 EA 15 ADDTL MIN - UMMC: Performed by: INTERNAL MEDICINE

## 2020-08-02 PROCEDURE — 71000014 ZZH RECOVERY PHASE 1 LEVEL 2 FIRST HR: Performed by: INTERNAL MEDICINE

## 2020-08-02 PROCEDURE — 36000051 ZZH SURGERY LEVEL 2 1ST 30 MIN - UMMC: Performed by: INTERNAL MEDICINE

## 2020-08-02 PROCEDURE — 25000125 ZZHC RX 250: Performed by: NURSE ANESTHETIST, CERTIFIED REGISTERED

## 2020-08-02 PROCEDURE — 25800030 ZZH RX IP 258 OP 636: Performed by: NURSE ANESTHETIST, CERTIFIED REGISTERED

## 2020-08-02 PROCEDURE — 25000125 ZZHC RX 250: Performed by: INTERNAL MEDICINE

## 2020-08-02 PROCEDURE — 25000566 ZZH SEVOFLURANE, EA 15 MIN: Performed by: INTERNAL MEDICINE

## 2020-08-02 PROCEDURE — 25000128 H RX IP 250 OP 636: Performed by: INTERNAL MEDICINE

## 2020-08-02 PROCEDURE — 37000009 ZZH ANESTHESIA TECHNICAL FEE, EACH ADDTL 15 MIN: Performed by: INTERNAL MEDICINE

## 2020-08-02 RX ORDER — ONDANSETRON 2 MG/ML
INJECTION INTRAMUSCULAR; INTRAVENOUS PRN
Status: DISCONTINUED | OUTPATIENT
Start: 2020-08-02 | End: 2020-08-02

## 2020-08-02 RX ORDER — NALOXONE HYDROCHLORIDE 0.4 MG/ML
.1-.4 INJECTION, SOLUTION INTRAMUSCULAR; INTRAVENOUS; SUBCUTANEOUS
Status: DISCONTINUED | OUTPATIENT
Start: 2020-08-02 | End: 2020-08-02 | Stop reason: HOSPADM

## 2020-08-02 RX ORDER — BISACODYL 5 MG
5 TABLET, DELAYED RELEASE (ENTERIC COATED) ORAL DAILY PRN
Status: DISCONTINUED | OUTPATIENT
Start: 2020-08-02 | End: 2020-08-02 | Stop reason: HOSPADM

## 2020-08-02 RX ORDER — METFORMIN HCL 500 MG
500 TABLET, EXTENDED RELEASE 24 HR ORAL
Status: DISCONTINUED | OUTPATIENT
Start: 2020-08-02 | End: 2020-08-02 | Stop reason: HOSPADM

## 2020-08-02 RX ORDER — ONDANSETRON 4 MG/1
4 TABLET, ORALLY DISINTEGRATING ORAL EVERY 6 HOURS PRN
Status: DISCONTINUED | OUTPATIENT
Start: 2020-08-02 | End: 2020-08-02 | Stop reason: HOSPADM

## 2020-08-02 RX ORDER — FLUMAZENIL 0.1 MG/ML
0.2 INJECTION, SOLUTION INTRAVENOUS
Status: DISCONTINUED | OUTPATIENT
Start: 2020-08-02 | End: 2020-08-02 | Stop reason: HOSPADM

## 2020-08-02 RX ORDER — POLYETHYLENE GLYCOL 3350 17 G/17G
17 POWDER, FOR SOLUTION ORAL DAILY PRN
Status: DISCONTINUED | OUTPATIENT
Start: 2020-08-02 | End: 2020-08-02 | Stop reason: HOSPADM

## 2020-08-02 RX ORDER — DEXAMETHASONE SODIUM PHOSPHATE 10 MG/ML
INJECTION, SOLUTION INTRAMUSCULAR; INTRAVENOUS PRN
Status: DISCONTINUED | OUTPATIENT
Start: 2020-08-02 | End: 2020-08-02

## 2020-08-02 RX ORDER — LIDOCAINE 40 MG/G
CREAM TOPICAL
Status: DISCONTINUED | OUTPATIENT
Start: 2020-08-02 | End: 2020-08-02 | Stop reason: HOSPADM

## 2020-08-02 RX ORDER — DESVENLAFAXINE 50 MG/1
100 TABLET, FILM COATED, EXTENDED RELEASE ORAL DAILY
Status: DISCONTINUED | OUTPATIENT
Start: 2020-08-02 | End: 2020-08-02 | Stop reason: HOSPADM

## 2020-08-02 RX ORDER — CLONIDINE HYDROCHLORIDE 0.1 MG/1
0.1 TABLET ORAL 2 TIMES DAILY
Status: DISCONTINUED | OUTPATIENT
Start: 2020-08-02 | End: 2020-08-02 | Stop reason: HOSPADM

## 2020-08-02 RX ORDER — BISACODYL 5 MG
15 TABLET, DELAYED RELEASE (ENTERIC COATED) ORAL DAILY PRN
Status: DISCONTINUED | OUTPATIENT
Start: 2020-08-02 | End: 2020-08-02 | Stop reason: HOSPADM

## 2020-08-02 RX ORDER — ACETAMINOPHEN AND CODEINE PHOSPHATE 120; 12 MG/5ML; MG/5ML
0.35 SOLUTION ORAL DAILY
Status: DISCONTINUED | OUTPATIENT
Start: 2020-08-02 | End: 2020-08-02 | Stop reason: HOSPADM

## 2020-08-02 RX ORDER — ACETAMINOPHEN 325 MG/1
650 TABLET ORAL EVERY 4 HOURS PRN
Status: DISCONTINUED | OUTPATIENT
Start: 2020-08-02 | End: 2020-08-02 | Stop reason: HOSPADM

## 2020-08-02 RX ORDER — HEPARIN SODIUM,PORCINE 10 UNIT/ML
5-10 VIAL (ML) INTRAVENOUS
Status: DISCONTINUED | OUTPATIENT
Start: 2020-08-02 | End: 2020-08-02 | Stop reason: HOSPADM

## 2020-08-02 RX ORDER — HYDROXYCHLOROQUINE SULFATE 200 MG/1
200 TABLET, FILM COATED ORAL 2 TIMES DAILY
Status: DISCONTINUED | OUTPATIENT
Start: 2020-08-02 | End: 2020-08-02 | Stop reason: HOSPADM

## 2020-08-02 RX ORDER — SODIUM CHLORIDE, SODIUM LACTATE, POTASSIUM CHLORIDE, CALCIUM CHLORIDE 600; 310; 30; 20 MG/100ML; MG/100ML; MG/100ML; MG/100ML
INJECTION, SOLUTION INTRAVENOUS CONTINUOUS PRN
Status: DISCONTINUED | OUTPATIENT
Start: 2020-08-02 | End: 2020-08-02

## 2020-08-02 RX ORDER — PROPOFOL 10 MG/ML
INJECTION, EMULSION INTRAVENOUS PRN
Status: DISCONTINUED | OUTPATIENT
Start: 2020-08-02 | End: 2020-08-02

## 2020-08-02 RX ORDER — DIPHENHYDRAMINE HYDROCHLORIDE 50 MG/ML
INJECTION INTRAMUSCULAR; INTRAVENOUS PRN
Status: DISCONTINUED | OUTPATIENT
Start: 2020-08-02 | End: 2020-08-02

## 2020-08-02 RX ORDER — PANTOPRAZOLE SODIUM 40 MG/1
40 TABLET, DELAYED RELEASE ORAL DAILY
Status: DISCONTINUED | OUTPATIENT
Start: 2020-08-02 | End: 2020-08-02 | Stop reason: HOSPADM

## 2020-08-02 RX ORDER — BUSPIRONE HYDROCHLORIDE 10 MG/1
10 TABLET ORAL 2 TIMES DAILY
Status: DISCONTINUED | OUTPATIENT
Start: 2020-08-02 | End: 2020-08-02 | Stop reason: HOSPADM

## 2020-08-02 RX ORDER — TOPIRAMATE 50 MG/1
50 TABLET, FILM COATED ORAL AT BEDTIME
Status: DISCONTINUED | OUTPATIENT
Start: 2020-08-02 | End: 2020-08-02 | Stop reason: HOSPADM

## 2020-08-02 RX ORDER — HEPARIN SODIUM (PORCINE) LOCK FLUSH IV SOLN 100 UNIT/ML 100 UNIT/ML
5 SOLUTION INTRAVENOUS
Status: DISCONTINUED | OUTPATIENT
Start: 2020-08-02 | End: 2020-08-02 | Stop reason: HOSPADM

## 2020-08-02 RX ORDER — PREGABALIN 50 MG/1
50 CAPSULE ORAL 3 TIMES DAILY
Status: DISCONTINUED | OUTPATIENT
Start: 2020-08-02 | End: 2020-08-02 | Stop reason: HOSPADM

## 2020-08-02 RX ORDER — BISACODYL 5 MG
10 TABLET, DELAYED RELEASE (ENTERIC COATED) ORAL DAILY PRN
Status: DISCONTINUED | OUTPATIENT
Start: 2020-08-02 | End: 2020-08-02 | Stop reason: HOSPADM

## 2020-08-02 RX ORDER — HYDROXYZINE HYDROCHLORIDE 25 MG/1
50 TABLET, FILM COATED ORAL 3 TIMES DAILY PRN
Status: DISCONTINUED | OUTPATIENT
Start: 2020-08-02 | End: 2020-08-02 | Stop reason: HOSPADM

## 2020-08-02 RX ORDER — ONDANSETRON 2 MG/ML
4 INJECTION INTRAMUSCULAR; INTRAVENOUS EVERY 6 HOURS PRN
Status: DISCONTINUED | OUTPATIENT
Start: 2020-08-02 | End: 2020-08-02 | Stop reason: HOSPADM

## 2020-08-02 RX ORDER — HEPARIN SODIUM,PORCINE 10 UNIT/ML
5-10 VIAL (ML) INTRAVENOUS EVERY 24 HOURS
Status: DISCONTINUED | OUTPATIENT
Start: 2020-08-02 | End: 2020-08-02 | Stop reason: HOSPADM

## 2020-08-02 RX ADMIN — SUGAMMADEX 400 MG: 100 INJECTION, SOLUTION INTRAVENOUS at 01:00

## 2020-08-02 RX ADMIN — DEXAMETHASONE SODIUM PHOSPHATE 8 MG: 10 INJECTION, SOLUTION INTRAMUSCULAR; INTRAVENOUS at 00:36

## 2020-08-02 RX ADMIN — PREGABALIN 50 MG: 50 CAPSULE ORAL at 08:36

## 2020-08-02 RX ADMIN — PROPOFOL 300 MG: 10 INJECTION, EMULSION INTRAVENOUS at 00:25

## 2020-08-02 RX ADMIN — CLONIDINE HYDROCHLORIDE 0.1 MG: 0.1 TABLET ORAL at 08:35

## 2020-08-02 RX ADMIN — DIPHENHYDRAMINE HYDROCHLORIDE 50 MG: 50 INJECTION, SOLUTION INTRAMUSCULAR; INTRAVENOUS at 00:28

## 2020-08-02 RX ADMIN — BUSPIRONE HYDROCHLORIDE 10 MG: 10 TABLET ORAL at 08:35

## 2020-08-02 RX ADMIN — PANTOPRAZOLE SODIUM 40 MG: 40 TABLET, DELAYED RELEASE ORAL at 08:35

## 2020-08-02 RX ADMIN — ROCURONIUM BROMIDE 100 MG: 10 INJECTION INTRAVENOUS at 00:27

## 2020-08-02 RX ADMIN — HYDROXYCHLOROQUINE SULFATE 200 MG: 200 TABLET, FILM COATED ORAL at 10:48

## 2020-08-02 RX ADMIN — ONDANSETRON 4 MG: 2 INJECTION INTRAMUSCULAR; INTRAVENOUS at 00:36

## 2020-08-02 RX ADMIN — Medication 5 ML: at 10:52

## 2020-08-02 RX ADMIN — SODIUM CHLORIDE, POTASSIUM CHLORIDE, SODIUM LACTATE AND CALCIUM CHLORIDE: 600; 310; 30; 20 INJECTION, SOLUTION INTRAVENOUS at 00:19

## 2020-08-02 RX ADMIN — Medication 5 ML: at 10:48

## 2020-08-02 NOTE — ANESTHESIA PREPROCEDURE EVALUATION
Anesthesia Pre-Procedure Evaluation    Patient: Nevin Alvarado   MRN:     [id number] Gender:   female   Age:    29 year old :      1990        Preoperative Diagnosis: History of esophagogastroduodenoscopy [Z98.890]   Procedure(s):  ESOPHAGOGASTRODUODENOSCOPY (EGD)     LABS:  CBC:   Lab Results   Component Value Date    WBC 10.7 2020    WBC 10.5 2020    HGB 11.9 2020    HGB 12.3 2020    HCT 37.1 2020    HCT 38.8 2020     2020     2020     BMP:   Lab Results   Component Value Date     2020     2020    POTASSIUM 3.5 2020    POTASSIUM 3.4 2020    CHLORIDE 111 (H) 2020    CHLORIDE 109 2020    CO2 23 2020    CO2 24 2020    BUN 8 2020    BUN 9 2020    CR 0.76 2020    CR 0.62 2020    GLC 98 2020    GLC 94 2020     COAGS:   Lab Results   Component Value Date    PTT 46 (H) 2020    INR 1.11 2020     POC:   Lab Results   Component Value Date     (H) 2020    HCG Negative 2020    HCGS Negative 2020     OTHER:   Lab Results   Component Value Date    LACT 1.0 2019    KELBY 8.7 2020    PHOS 3.5 2019    MAG 1.9 2019    ALBUMIN 3.5 2020    PROTTOTAL 8.8 2020    ALT 31 2020    AST 20 2020    ALKPHOS 86 2020    BILITOTAL 0.3 2020    LIPASE 146 02/15/2020    TSH 4.56 (H) 02/15/2020    T4 0.78 02/15/2020    CRP 6.6 2019    SED 26 (H) 2017        Preop Vitals    BP Readings from Last 3 Encounters:   20 127/78   20 121/83   20 125/81    Pulse Readings from Last 3 Encounters:   20 86   20 68   20 74      Resp Readings from Last 3 Encounters:   20 14   20 18   20 16    SpO2 Readings from Last 3 Encounters:   20 92%   20 98%   20 97%      Temp Readings from Last 1 Encounters:   20 36.4  C  "(97.5  F) (Oral)    Ht Readings from Last 1 Encounters:   08/01/20 1.575 m (5' 2\")      Wt Readings from Last 1 Encounters:   08/01/20 115.7 kg (255 lb)    Estimated body mass index is 46.64 kg/m  as calculated from the following:    Height as of 8/1/20: 1.575 m (5' 2\").    Weight as of 8/1/20: 115.7 kg (255 lb).     LDA:  Port A Cath Single 07/11/20 Right Chest wall (Active)   Access Date 08/01/20 08/01/20 2054   Access Attempts 1 08/01/20 2054   Gauge 20 gauge 08/01/20 2054   Site Assessment WDL 08/02/20 0120   Line Status Infusing 08/02/20 0120   Extravasation? No 08/01/20 2054   Dressing Intervention Transparent 08/01/20 2054   Dressing change due 08/08/20 08/01/20 2054   Line Necessity Yes, meets criteria 08/01/20 2054   Number of days: 22        Past Medical History:   Diagnosis Date     ADD (attention deficit disorder)      Anorexia nervosa with bulimia     history of; on Topamax     Anxiety      Borderline personality disorder (H)      Depression      H/O self-harm      History of pulmonary embolism 12/2019    Provoked. Completed 3 month course of Apixaban     Morbid obesity (H)      Neuropathy      PTSD (post-traumatic stress disorder)      Rectal foreign body - Recurrent issue, self placed      Swallowed foreign body - Recurrent issue, self placed       Past Surgical History:   Procedure Laterality Date     COMBINED ESOPHAGOSCOPY, GASTROSCOPY, DUODENOSCOPY (EGD), REPLACE ESOPHAGEAL STENT N/A 10/9/2019    Procedure: Upper Endoscopy with Suture Placement;  Surgeon: Zurdo Ramirez MD;  [facility]     ESOPHAGOSCOPY, GASTROSCOPY, DUODENOSCOPY (EGD), COMBINED N/A 3/9/2017    Procedure: COMBINED ESOPHAGOSCOPY, GASTROSCOPY, DUODENOSCOPY (EGD), REMOVE FOREIGN BODY;  Surgeon: Avis Guzmán MD;  [facility]     ESOPHAGOSCOPY, GASTROSCOPY, DUODENOSCOPY (EGD), COMBINED N/A 4/20/2017    Procedure: COMBINED ESOPHAGOSCOPY, GASTROSCOPY, DUODENOSCOPY (EGD), REMOVE FOREIGN BODY;  EGD removal " Foregin body;  Surgeon: Lokesh Paula MD;  [facility]     ESOPHAGOSCOPY, GASTROSCOPY, DUODENOSCOPY (EGD), COMBINED N/A 6/12/2017    Procedure: COMBINED ESOPHAGOSCOPY, GASTROSCOPY, DUODENOSCOPY (EGD);  COMBINED ESOPHAGOSCOPY, GASTROSCOPY, DUODENOSCOPY (EGD) [[id number]]attempted removal of foreign body;  Surgeon: Pamela Perez MD;  [facility]     ESOPHAGOSCOPY, GASTROSCOPY, DUODENOSCOPY (EGD), COMBINED N/A 6/9/2017    Procedure: COMBINED ESOPHAGOSCOPY, GASTROSCOPY, DUODENOSCOPY (EGD), REMOVE FOREIGN BODY;  Esophagoscopy, Gastroscopy, Duodenoscopy, Removal of Foreign Body;  Surgeon: Dejon Marsh MD;  [facility]     ESOPHAGOSCOPY, GASTROSCOPY, DUODENOSCOPY (EGD), COMBINED N/A 1/6/2018    Procedure: COMBINED ESOPHAGOSCOPY, GASTROSCOPY, DUODENOSCOPY (EGD), REMOVE FOREIGN BODY;  COMBINED ESOPHAGOSCOPY, GASTROSCOPY, DUODENOSCOPY (EGD) [by pascal net and snare with profol sedation;  Surgeon: Feliciano Emmanuel MD;  Location:  GI     ESOPHAGOSCOPY, GASTROSCOPY, DUODENOSCOPY (EGD), COMBINED N/A 3/19/2018    Procedure: COMBINED ESOPHAGOSCOPY, GASTROSCOPY, DUODENOSCOPY (EGD), REMOVE FOREIGN BODY;   Esophagodscopy, Gastroscopy, Duodenoscopy,Foreign Body Removal;  Surgeon: Brcie Guzmán MD;  [facility]     ESOPHAGOSCOPY, GASTROSCOPY, DUODENOSCOPY (EGD), COMBINED N/A 4/16/2018    Procedure: COMBINED ESOPHAGOSCOPY, GASTROSCOPY, DUODENOSCOPY (EGD), REMOVE FOREIGN BODY;  Esophagogastroduodenoscopy  Foreign Body Removal X 2;  Surgeon: Royer Moise MD;  [facility]     ESOPHAGOSCOPY, GASTROSCOPY, DUODENOSCOPY (EGD), COMBINED N/A 6/1/2018    Procedure: COMBINED ESOPHAGOSCOPY, GASTROSCOPY, DUODENOSCOPY (EGD), REMOVE FOREIGN BODY;  COMBINED ESOPHAGOSCOPY, GASTROSCOPY, DUODENOSCOPY with removal of foreign body, propofol sedation by anesthesia;  Surgeon: Brice Martinez MD;  Location:  GI     ESOPHAGOSCOPY, GASTROSCOPY, DUODENOSCOPY (EGD), COMBINED N/A  7/25/2018    Procedure: COMBINED ESOPHAGOSCOPY, GASTROSCOPY, DUODENOSCOPY (EGD), REMOVE FOREIGN BODY;;  Surgeon: Candy Castelan MD;  Location:  GI     ESOPHAGOSCOPY, GASTROSCOPY, DUODENOSCOPY (EGD), COMBINED N/A 7/28/2018    Procedure: COMBINED ESOPHAGOSCOPY, GASTROSCOPY, DUODENOSCOPY (EGD), REMOVE FOREIGN BODY;  COMBINED ESOPHAGOSCOPY, GASTROSCOPY, DUODENOSCOPY (EGD), REMOVE FOREIGN BODY;  Surgeon: Brice Guzmán MD;  [facility]     ESOPHAGOSCOPY, GASTROSCOPY, DUODENOSCOPY (EGD), COMBINED N/A 7/31/2018    Procedure: COMBINED ESOPHAGOSCOPY, GASTROSCOPY, DUODENOSCOPY (EGD);  COMBINED ESOPHAGOSCOPY, GASTROSCOPY, DUODENOSCOPY (EGD) TO REMOVE FOREIGN BODY;  Surgeon: Lokesh Paula MD;  [facility]     ESOPHAGOSCOPY, GASTROSCOPY, DUODENOSCOPY (EGD), COMBINED N/A 8/4/2018    Procedure: COMBINED ESOPHAGOSCOPY, GASTROSCOPY, DUODENOSCOPY (EGD), REMOVE FOREIGN BODY;   combined esophagoscopy, gastroscopy, duodenoscopy, REMOVE FOREIGN BODY ;  Surgeon: Lokesh Paula MD;  [facility]     ESOPHAGOSCOPY, GASTROSCOPY, DUODENOSCOPY (EGD), COMBINED N/A 10/6/2019    Procedure: ESOPHAGOGASTRODUODENOSCOPY (EGD) with fireign body removal x2, esophageal stent placement with floroscopy;  Surgeon: Timoteo Espana MD;  [facility]     ESOPHAGOSCOPY, GASTROSCOPY, DUODENOSCOPY (EGD), COMBINED N/A 12/2/2019    Procedure: Esophagogastroduodenoscopy with esophageal stent removal, esophogram;  Surgeon: Kailee Tena MD;  [facility]     ESOPHAGOSCOPY, GASTROSCOPY, DUODENOSCOPY (EGD), COMBINED N/A 12/17/2019    Procedure: ESOPHAGOGASTRODUODENOSCOPY, WITH FOREIGN BODY REMOVAL;  Surgeon: Pamela Perez MD;  [facility]     ESOPHAGOSCOPY, GASTROSCOPY, DUODENOSCOPY (EGD), COMBINED N/A 12/13/2019    Procedure: ESOPHAGOGASTRODUODENOSCOPY, WITH FOREIGN BODY REMOVAL;  Surgeon: Samia Stanton MD;  [facility]     ESOPHAGOSCOPY, GASTROSCOPY, DUODENOSCOPY (EGD), COMBINED N/A  12/28/2019    Procedure: ESOPHAGOGASTRODUODENOSCOPY (EGD) Removal of Foreign Body X 2;  Surgeon: Huy Kelley MD;  [facility]     ESOPHAGOSCOPY, GASTROSCOPY, DUODENOSCOPY (EGD), COMBINED N/A 1/5/2020    Procedure: ESOPHAGOGASTRODUOENOSCOPY WITH FOREIGN BODY REMOVAL;  Surgeon: Pamela Perez MD;  [facility]     ESOPHAGOSCOPY, GASTROSCOPY, DUODENOSCOPY (EGD), COMBINED N/A 1/3/2020    Procedure: ESOPHAGOGASTRODUODENOSCOPY (EGD) REMOVAL OF FOREIGN BODY.;  Surgeon: Pamela Perez MD;  [facility]     ESOPHAGOSCOPY, GASTROSCOPY, DUODENOSCOPY (EGD), COMBINED N/A 1/13/2020    Procedure: ESOPHAGOGASTRODUODENOSCOPY (EGD) for foreign body removal;  Surgeon: Lokesh Paula MD;  [facility]     ESOPHAGOSCOPY, GASTROSCOPY, DUODENOSCOPY (EGD), COMBINED N/A 1/18/2020    Procedure: Diagnostic ESOPHAGOGASTRODUODENOSCOPY (EGD;  Surgeon: Lokesh Paula MD;  [facility]     ESOPHAGOSCOPY, GASTROSCOPY, DUODENOSCOPY (EGD), COMBINED N/A 3/29/2020    Procedure: UPPER ENDOSCOPY WITH FOREIGN BODY REMOVAL;  Surgeon: Doug Hansen MD;  [facility]     ESOPHAGOSCOPY, GASTROSCOPY, DUODENOSCOPY (EGD), COMBINED N/A 7/11/2020    Procedure: ESOPHAGOGASTRODUODENOSCOPY (EGD); Upper Endoscopy WITH FOREIGN BODY REMOVAL;  Surgeon: Lyndsey Mendoza DO;  [facility]     ESOPHAGOSCOPY, GASTROSCOPY, DUODENOSCOPY (EGD), COMBINED N/A 7/29/2020    Procedure: ESOPHAGOGASTRODUODENOSCOPY REMOVAL OF FOREIGN BODY;  Surgeon: Samia Stanton MD;  [facility]     EXAM UNDER ANESTHESIA ANUS N/A 1/10/2017    Procedure: EXAM UNDER ANESTHESIA ANUS;  Surgeon: Annmarie Haynes MD;  [facility]     EXAM UNDER ANESTHESIA RECTUM N/A 7/19/2018    Procedure: EXAM UNDER ANESTHESIA RECTUM;  EXAM UNDER ANESTHESIA, REMOVAL OF RECTAL FOREIGN BODY;  Surgeon: Annmarie Haynes MD;  [facility]     HC REMOVE FECAL IMPACTION OR FB W ANESTHESIA N/A 12/18/2016     Procedure: REMOVE FECAL IMPACTION/FOREIGN BODY UNDER ANESTHESIA;  Surgeon: Soham Cano MD;  Location: [facility]     KNEE SURGERY - removed a small tissue mass from knee Right      LAPAROSCOPIC ABLATION ENDOMETRIOSIS       LAPAROTOMY EXPLORATORY N/A 1/10/2017    Procedure: LAPAROTOMY EXPLORATORY;  Surgeon: Annmarie Haynes MD;  [facility]     LAPAROTOMY EXPLORATORY  09/11/2019    Manual manipulation of bowel to remove pill bottle in rectum     lymph nodes removed from neck; benign  age 7     MAMMOPLASTY REDUCTION Bilateral      RELEASE CARPAL TUNNEL Bilateral      SIGMOIDOSCOPY FLEXIBLE N/A 1/10/2017    Procedure: SIGMOIDOSCOPY FLEXIBLE;  Surgeon: Annmarie Haynes MD;  [facility]     SIGMOIDOSCOPY FLEXIBLE N/A 5/8/2018    Procedure: SIGMOIDOSCOPY FLEXIBLE;  flex sig with foreign body removal using snare and rattooth forcep;  Surgeon: Soham Cano MD;  Location: [facility]     SIGMOIDOSCOPY FLEXIBLE N/A 2/20/2019    Procedure: Exam under anesthesia Colonoscopy with attempted  removal of rectal foreign body;  Surgeon: Estrada Chávez MD;  [facility]      Allergies   Allergen Reactions     Amoxicillin-Pot Clavulanate Other (See Comments), Swelling and Rash     PN: facial swelling, left side. Also had cortisone injection the same day as she started the Augmentin.  Noted in downtime recovery of chart.       Penicillins Anaphylaxis     Vancomycin Itching, Swelling and Rash     Other reaction(s): Redness  Flushed face, very itchy; HUT Comment: Flushed face, very itchy; HUT Reaction: Angioedema; HUT Reaction: Redness; HUT Severity: Med; HUT Noted: [id number]       Hydrocodone Nausea and Vomiting and GI Disturbance     vomiting for days, PN: vomiting for days; HUT Comment: vomiting for days; HUT Reaction: Gastrointestinal; HUT Reaction: Nausea And Vomiting; HUT Reaction Type: Intolerance; HUT Severity: Med; HUT Noted: [id number]  vomiting for days       Blood-Group Specific Substance Other (See  Comments)     Patient has an anti-Cw and non-specific antibodies. Blood product orders may be delayed. Draw one red top and two purple top tubes for all type/screen/crossmatch orders.     Influenza Vaccines Other (See Comments)     Oseltamivir Hives     med stopped, PN: med stopped     Cephalosporins Rash     Lamotrigine Rash     Possibly associated with Lamictal.   HUT Comment: Possibly associated with Lamictal. ; HUT Reaction: Rash; HUT Reaction Type: Allergy; HUT Severity: Low; HUT Noted: [id number]     Latex Rash        Anesthesia Evaluation     . Pt has had prior anesthetic.     No history of anesthetic complications          ROS/MED HX    ENT/Pulmonary:       Neurologic:       Cardiovascular:         METS/Exercise Tolerance:     Hematologic:     (+) History of blood clots -      Musculoskeletal:         GI/Hepatic: Comment: Foreign body ingestion    (+) GERD       Renal/Genitourinary:      (-) renal disease   Endo:     (+) Obesity, .      Psychiatric:     (+) psychiatric history anxiety, depression, other (comment) and bipolar      Infectious Disease:         Malignancy:         Other:                         PHYSICAL EXAM:   Mental Status/Neuro: A/A/O   Airway: Facies: Feasible  Mallampati: II  Mouth/Opening: Full  TM distance: > 6 cm  Neck ROM: Full   Respiratory: Auscultation: CTAB     Resp. Rate: Normal     Resp. Effort: Normal      CV: Rhythm: Regular  Rate: Age appropriate  Heart: Normal Sounds  Edema: None   Comments:      Dental: Normal Dentition                Assessment:   ASA SCORE: 2    H&P: History and physical reviewed and following examination; no interval change.    NPO Status: NPO Appropriate     Plan:   Anes. Type:  General   Pre-Medication: None   Induction:  IV (RSI)   Airway: ETT; Oral   Access/Monitoring: PIV   Maintenance: Balanced     Postop Plan:     Postop Sedation/Airway: Not planned     PONV Management: Adult Risk Factors: Female   Prevention:, Dexamethasone     CONSENT: Direct  conversation   Plan and risks discussed with: Patient   Blood Products: Consent Deferred (Minimal Blood Loss)                       Kiersten Frye MD

## 2020-08-02 NOTE — PLAN OF CARE
OBSERVATION GOALS PRIOR TO DISCHARGE      - Paperclip extracted by GI : Met      Patient arrived from PACU at 02. Alert and oriented. Stated she had dull pain. Stated that she was tired and wanted to sleep and requested for admission profile wait till morning. Patient could not wake up to take her meds. Attendant with patient.

## 2020-08-02 NOTE — ANESTHESIA POSTPROCEDURE EVALUATION
Anesthesia POST Procedure Evaluation    Patient: Nevin Alvarado   MRN:     [id number] Gender:   female   Age:    29 year old :      1990        Preoperative Diagnosis: Foreign body in digestive tract, subsequent encounter [T18.9XXD]   Procedure(s):  ESOPHAGOGASTRODUODENOSCOPY, WITH FOREIGN BODY REMOVAL   Postop Comments: No value filed.     Anesthesia Type: General       Disposition: Admission   Postop Pain Control: Uneventful            Sign Out: Well controlled pain   PONV: No   Neuro/Psych: Uneventful            Sign Out: Acceptable/Baseline neuro status   Airway/Respiratory: Uneventful            Sign Out: Acceptable/Baseline resp. status   CV/Hemodynamics: Uneventful            Sign Out: Acceptable CV status   Other NRE: NONE   DID A NON-ROUTINE EVENT OCCUR? No         Last Anesthesia Record Vitals:  CRNA VITALS  2020 0039 - 2020 0139      2020             NIBP:  141/87    Pulse:  84    NIBP Mean:  103    Ht Rate:  84    SpO2:  99 %          Last PACU Vitals:  Vitals Value Taken Time   /78 2020  2:04 AM   Temp 36.4  C (97.5  F) 2020  2:04 AM   Pulse 86 2020  1:40 AM   Resp 14 2020  2:04 AM   SpO2 92 % 2020  2:04 AM   Temp src     NIBP     Pulse     SpO2     Resp     Temp     Ht Rate     Temp 2     Vitals shown include unvalidated device data.      Electronically Signed By: Kiersten Frye MD, 2020, 3:09 AM

## 2020-08-02 NOTE — ANESTHESIA POSTPROCEDURE EVALUATION
Anesthesia POST Procedure Evaluation    Patient: Nevin Alvarado   MRN:     [id number] Gender:   female   Age:    29 year old :      1990        Preoperative Diagnosis: Foreign body in digestive tract, subsequent encounter [T18.9XXD]   Procedure(s):  ESOPHAGOGASTRODUODENOSCOPY, WITH FOREIGN BODY REMOVAL   Postop Comments: No value filed.     Anesthesia Type: General          Postop Pain Control: Uneventful            Sign Out: Well controlled pain   PONV: No   Neuro/Psych: Uneventful            Sign Out: Acceptable/Baseline neuro status   Airway/Respiratory: Uneventful            Sign Out: Acceptable/Baseline resp. status   CV/Hemodynamics: Uneventful            Sign Out: Acceptable CV status   Other NRE: NONE   DID A NON-ROUTINE EVENT OCCUR? No         Last Anesthesia Record Vitals:  CRNA VITALS  2020 0039 - 2020 0124      2020             NIBP:  141/87    Pulse:  84    NIBP Mean:  103    Ht Rate:  84    SpO2:  99 %          Last PACU Vitals:  Vitals Value Taken Time   /104 2020  1:20 AM   Temp     Pulse 90 2020  1:20 AM   Resp     SpO2 98 % 2020  1:23 AM   Temp src     NIBP     Pulse     SpO2     Resp     Temp     Ht Rate     Temp 2     Vitals shown include unvalidated device data.      Electronically Signed By: Kiersten Frye MD, 2020, 1:24 AM

## 2020-08-02 NOTE — PROCEDURES
Gastroenterology Endoscopy Suite Brief Operative Note    Procedure:  Upper endoscopy   Post-operative diagnosis:  Foreign body removal   Staff Physician:  Dr. Pamela Perez   Fellow/Assistant(s):  Dr. Myesha Kelly    Specimens:  Please see final procedure note for further details.   Findings:  Straightened paperclip in the gastric body. Removed successfully via snare and foreign body simons.   Complications:  None.   Condition:  Stable   Recommendations  Diet:  Return to previous diet  PPI:  N/A  Anti-coagulants/platelets:  N/A  Octreotide:  N/A  Discharge Planning:   [facility].

## 2020-08-02 NOTE — DISCHARGE SUMMARY
[facility], [place]  Hospitalist Discharge Summary      Date of Admission:  8/1/2020  Date of Discharge:  8/2/2020 11:57 AM  Discharging Provider: Oliva White MD  Discharge Team: Hospitalist Service, Gold 8    Discharge Diagnoses   # Ingestion of foreign object (paper clip)  # Hx PTSD, Borderline personality disorder, ADD    Chronic Problems:  Hx Granuloma annulare  Hx Metabolic Syndrome    Follow-ups Needed After Discharge   Follow-up Appointments     Adult [facility] Follow-up and recommended labs and tests      Follow up with your psychiatrist and care team as outpatient as   previously scheduled.       Appointments on [place] and/or [facility] (with [facility] or [facility]   provider or service). Call [phone] if you haven't heard regarding   these appointments within 7 days of discharge.         Patient encouraged to complete close psychiatry follow-up. She tells me that she has a therapy appointment tomorrow 8/3.     Unresulted Labs Ordered in the Past 30 Days of this Admission     No orders found for last 31 day(s).      These results will be followed up by NA    Discharge Disposition   Discharged to home  Condition at discharge: Stable      Hospital Course   Nevin Alvarado is a 29 year old woman admitted on 7/11/2020. She has a history of pulmonary embolus, BPD, ADD, anorexia, PTSD and foreign body ingestion.  I admitted her 7/11 with an ingested paper clip, 7/28 with an ingested pen spring and she is now being admitted with another ingested paper clip.     # Foreign body ingestion (paper clip)   Patient ingested a paper clip today during an episode of anxiety.  She has had multiple admissions for foreign body ingestion and is familiar to GI and medicine service. She had several months without any issues earlier this year but is now being admitted roughly every week with ingestions. Seen by GI in ED, had EGD with removal of paper clip without incident. Patient  asymptomatic this am and feeling at baseline. Discussed with psychiatry colleague on call, Ana Laura Kemp, who saw the patient on 7/29. Assessed patient for active SI, which was negative. Per psychiatry, if not actively suicidal, not appropriate for inpatient psych. She is well connected to outpatient psychiatrists and behavioral therapy. No changes to medication necessary. Per psychiatry ok to discharge. Discussed with patient, who reassured me she was not trying to hurt herself, had behavioral therapy scheduled for tomorrow 8/3. Discharged to home with.        # History of PTSD, BPD, ADD  - Continue home buspirone, desvenlafaxine, hydroxyzine, lurasidone, pregabalin, topiramate. Has close follow up with outpatient psych. Nothing else to do. See above.     No other changes made.    Consultations This Hospital Stay   PSYCHIATRY IP CONSULT  GI LUMINAL ADULT IP CONSULT    Code Status   Full Code    Time Spent on this Encounter   I, Oliva White MD, personally saw the patient today and spent less than or equal to 30 minutes discharging this patient.       Oliva White MD  [facility], [place]  ______________________________________________________________________    Physical Exam   Vital Signs: Temp: 97.4  F (36.3  C) Temp src: Oral BP: (!) 136/91 Pulse: 85 Heart Rate: 87 Resp: 18 SpO2: 97 % O2 Device: None (Room air) Oxygen Delivery: 2 LPM    Constitutional:   Well nourished, well developed, resting comfortably   Head: Normocephalic and atraumatic.   Eyes: Conjunctivae are normal. Pupils are equal, round, and reactive to light.  Pharynx has no erythema or exudate, mucous membranes are moist  Neck:   No adenopathy, no bony tenderness  Cardiovascular: Regular rate and rhythm without murmurs or gallops  Pulmonary/Chest: Clear to auscultation bilaterally, with no wheezes or retractions. No respiratory distress.  GI: Soft with good bowel sounds.  Non-tender, non-distended, with no  guarding, no rebound, no peritoneal signs.   Back:  No bony or CVA tenderness   Musculoskeletal:  No edema or clubbing   Skin: Skin is warm and dry. No rash noted.   Neurological: Alert and oriented to person, place, and time. Nonfocal exam  Psychiatric:  Normal mood and affect.    Primary Care Physician   Latonya Knight    Discharge Orders      Reason for your hospital stay    You were admitted for monitoring after swallowing a paper clip. GI was able to remove the clip via EGD, and you were monitored overnight. You remained stable and will discharge to home without any med changes while keeping current psychiatry and DBT follow up.     Adult [facility] Follow-up and recommended labs and tests    Follow up with your psychiatrist and care team as outpatient as previously scheduled.       Appointments on [place] and/or [facility] (with [facility] or [facility] provider or service). Call [phone] if you haven't heard regarding these appointments within 7 days of discharge.     Activity    Your activity upon discharge: activity as tolerated     When to contact your care team    Fevers, chills, chest pain, abdominal pain, blood in stool, vomiting up blood     Discharge Instructions    No changes to home meds. Please follow up with your outpatient psychiatry team as scheduled.     Full Code     Diet    Follow this diet upon discharge: Orders Placed This Encounter      Regular Diet Adult       Significant Results and Procedures   Most Recent 3 CBC's:  Recent Labs   Lab Test 07/28/20 2054 07/11/20 2006 03/29/20  1737   WBC 10.7 10.5 9.7   HGB 11.9 12.3 11.6*   MCV 87 87 89    255 310     Most Recent 3 BMP's:  Recent Labs   Lab Test 07/28/20 2054 07/11/20 2006 03/29/20  1737    138 142   POTASSIUM 3.5 3.4 3.4   CHLORIDE 111* 109 110*   CO2 23 24 22   BUN 8 9 8   CR 0.76 0.62 0.58   ANIONGAP 6 5 9   KELBY 8.7 8.9 8.6   GLC 98 94 122*     Most Recent 2 LFT's:  Recent Labs   Lab Test 07/11/20 2006  02/15/20  2324   AST 20 21   ALT 31 20   ALKPHOS 86 95   BILITOTAL 0.3 0.2       Discharge Medications   Discharge Medication List as of 8/2/2020 10:41 AM      CONTINUE these medications which have NOT CHANGED    Details   acetaminophen (TYLENOL) 32 mg/mL liquid Take 31.25 mLs (1,000 mg) by mouth every 6 hours as needed for fever or pain, Disp-355 mL, R-0, E-Prescribe      albuterol (PROAIR HFA/PROVENTIL HFA/VENTOLIN HFA) 108 (90 Base) MCG/ACT inhaler Inhale 2 puffs into the lungs as needed for shortness of breath / dyspnea or wheezing, HistoricalPharmacy may dispense brand covered by insurance (Proair, or proventil or ventolin or generic albuterol inhaler)      busPIRone (BUSPAR) 10 MG tablet Take 1 tablet (10 mg) by mouth twice daily, Historical      Cholecalciferol (VITAMIN D) 50 MCG (2000 UT) CAPS Take 2,000 Units by mouth daily , Historical      cloNIDine (CATAPRES) 0.1 MG tablet Take 0.1 mg by mouth 2 times daily , Historical      desvenlafaxine (PRISTIQ) 100 MG 24 hr tablet Take 1 tablet (100 mg) by mouth every morning, Historical      docosanol (ABREVA) 10 % CREA cream Apply to lip 5 times a day as soon as symptoms begin, do not use for more than 10 days. Used PRN.Historical      hydroxychloroquine (PLAQUENIL) 200 MG tablet Take 200 mg by mouth 2 times daily, Historical      hydrOXYzine (ATARAX) 50 MG tablet Take 1 tablet (50 mg) by mouth 3 times daily as needed for anxiety, Disp-30 tablet, R-0, E-Prescribe      lurasidone (LATUDA) 60 MG TABS tablet Take 1 tablet (60 mg) by mouth at bedtime, Historical      metFORMIN (GLUCOPHAGE-XR) 500 MG 24 hr tablet Take 1,000 mg by mouth Take 1 tablet (500 mg) by mouth daily , Historical      norethindrone (MICRONOR) 0.35 MG tablet Take 0.35 mg by mouth daily, Historical      ondansetron (ZOFRAN-ODT) 4 MG ODT tab Take 4 mg by mouth every 6 hours as needed for nausea or vomiting , Historical      pantoprazole (PROTONIX) 40 MG EC tablet Take 1 tablet (40 mg) by mouth  daily, Disp-30 tablet,R-1, E-PrescribeMay stop after 8 weeks      pregabalin (LYRICA) 50 MG capsule Take 50 mg by mouth 3 times daily, Historical      Prenatal Vit-Fe Fumarate-FA (PNV PRENATAL PLUS MULTIVITAMIN) 27-1 MG TABS per tablet Take 1 tablet by mouth daily, Historical      topiramate (TOPAMAX) 100 MG tablet Take 50 mg by mouth Take 1 tablet (100 mg) by mouth daily at bedtime , Historical           Allergies   Allergies   Allergen Reactions     Amoxicillin-Pot Clavulanate Other (See Comments), Swelling and Rash     PN: facial swelling, left side. Also had cortisone injection the same day as she started the Augmentin.  Noted in downtime recovery of chart.       Penicillins Anaphylaxis     Vancomycin Itching, Swelling and Rash     Other reaction(s): Redness  Flushed face, very itchy; HUT Comment: Flushed face, very itchy; HUT Reaction: Angioedema; HUT Reaction: Redness; HUT Severity: Med; HUT Noted: [id number]       Hydrocodone Nausea and Vomiting and GI Disturbance     vomiting for days, PN: vomiting for days; HUT Comment: vomiting for days; HUT Reaction: Gastrointestinal; HUT Reaction: Nausea And Vomiting; HUT Reaction Type: Intolerance; HUT Severity: Med; HUT Noted: [id number]  vomiting for days       Blood-Group Specific Substance Other (See Comments)     Patient has an anti-Cw and non-specific antibodies. Blood product orders may be delayed. Draw one red top and two purple top tubes for all type/screen/crossmatch orders.     Influenza Vaccines Other (See Comments)     Oseltamivir Hives     med stopped, PN: med stopped     Cephalosporins Rash     Lamotrigine Rash     Possibly associated with Lamictal.   HUT Comment: Possibly associated with Lamictal. ; HUT Reaction: Rash; HUT Reaction Type: Allergy; HUT Severity: Low; HUT Noted: [id number]     Latex Rash

## 2020-08-02 NOTE — ED PROVIDER NOTES
[facility] EMERGENCY DEPARTMENT ([facility])  8/01/20    History     Chief Complaint   Patient presents with     Swallowed Foreign Body     Abdominal Pain     HPI  Nevin Alvarado is a 29 year old female with a past medical history of intentional self-harm by swallowing foreign bodies, borderline personality disorder, morbid obesity, pulmonary embolus, PTSD, anxiety, and depression who presents the Emergency Department after swallowing a paperclip at 1800.  Patient states that she had the urge to swallow foreign body all afternoon since waking up from a nap.  She states she does not know what precipitated this episode.  She straightened a paperclip and swallowed it.  She denies any other self injurious activities today.  She states she was trying to harm herself.  This is similar to previous occurrences.  She notes slight left upper abdominal pain.  No other symptoms noted.    Per chart review, the patient was hospitalized here from 7/28/2020-7/30/2020 after swallowing a pen spring.  During this hospitalization she noted that she had been engaging in swelling things lately due to increased stressors, depression, and anxiety.  She presented with epigastric pain and x-ray of the abdomen revealed a linear, thin wire-like radiodensity projecting over the distal esophagus and scattered air-fluid levels in the right abdomen.  Gastroenterology completed and an EGD finding and removing the pen spring in her stomach on 7/29/2020.  She was stable post procedure and had no nausea.  The patient was discharged home in stable condition with social work coordination and outpatient appointment with a psychiatrist.    I have reviewed the Medications, Allergies, Past Medical and Surgical History, and Social History in the [employer] system.  PAST MEDICAL HISTORY:   Past Medical History:   Diagnosis Date     ADD (attention deficit disorder)      Anorexia nervosa with bulimia     history of; on Topamax     Anxiety       Borderline personality disorder (H)      Depression      H/O self-harm      History of pulmonary embolism 12/2019    Provoked. Completed 3 month course of Apixaban     Morbid obesity (H)      Neuropathy      PTSD (post-traumatic stress disorder)      Rectal foreign body - Recurrent issue, self placed      Swallowed foreign body - Recurrent issue, self placed        PAST SURGICAL HISTORY:   Past Surgical History:   Procedure Laterality Date     COMBINED ESOPHAGOSCOPY, GASTROSCOPY, DUODENOSCOPY (EGD), REPLACE ESOPHAGEAL STENT N/A 10/9/2019    Procedure: Upper Endoscopy with Suture Placement;  Surgeon: Zurdo Ramirez MD;  Location: [facility] OR     ESOPHAGOSCOPY, GASTROSCOPY, DUODENOSCOPY (EGD), COMBINED N/A 3/9/2017    Procedure: COMBINED ESOPHAGOSCOPY, GASTROSCOPY, DUODENOSCOPY (EGD), REMOVE FOREIGN BODY;  Surgeon: Avis Guzmán MD;  Location: [facility] OR     ESOPHAGOSCOPY, GASTROSCOPY, DUODENOSCOPY (EGD), COMBINED N/A 4/20/2017    Procedure: COMBINED ESOPHAGOSCOPY, GASTROSCOPY, DUODENOSCOPY (EGD), REMOVE FOREIGN BODY;  EGD removal Foregin body;  Surgeon: Lokesh Paula MD;  Location: [facility]     ESOPHAGOSCOPY, GASTROSCOPY, DUODENOSCOPY (EGD), COMBINED N/A 6/12/2017    Procedure: COMBINED ESOPHAGOSCOPY, GASTROSCOPY, DUODENOSCOPY (EGD);  COMBINED ESOPHAGOSCOPY, GASTROSCOPY, DUODENOSCOPY (EGD) [[id number]]attempted removal of foreign body;  Surgeon: Pamela Perez MD;  Location: [facility]     ESOPHAGOSCOPY, GASTROSCOPY, DUODENOSCOPY (EGD), COMBINED N/A 6/9/2017    Procedure: COMBINED ESOPHAGOSCOPY, GASTROSCOPY, DUODENOSCOPY (EGD), REMOVE FOREIGN BODY;  Esophagoscopy, Gastroscopy, Duodenoscopy, Removal of Foreign Body;  Surgeon: Dejon Marsh MD;  Location: [facility]     ESOPHAGOSCOPY, GASTROSCOPY, DUODENOSCOPY (EGD), COMBINED N/A 1/6/2018    Procedure: COMBINED ESOPHAGOSCOPY, GASTROSCOPY, DUODENOSCOPY (EGD), REMOVE FOREIGN BODY;  COMBINED ESOPHAGOSCOPY, GASTROSCOPY, DUODENOSCOPY (EGD) [by naida  net and snare with profol sedation;  Surgeon: Feliciano Emmanuel MD;  Location:  GI     ESOPHAGOSCOPY, GASTROSCOPY, DUODENOSCOPY (EGD), COMBINED N/A 3/19/2018    Procedure: COMBINED ESOPHAGOSCOPY, GASTROSCOPY, DUODENOSCOPY (EGD), REMOVE FOREIGN BODY;   Esophagodscopy, Gastroscopy, Duodenoscopy,Foreign Body Removal;  Surgeon: Brice Guzmán MD;  [facility]     ESOPHAGOSCOPY, GASTROSCOPY, DUODENOSCOPY (EGD), COMBINED N/A 4/16/2018    Procedure: COMBINED ESOPHAGOSCOPY, GASTROSCOPY, DUODENOSCOPY (EGD), REMOVE FOREIGN BODY;  Esophagogastroduodenoscopy  Foreign Body Removal X 2;  Surgeon: Royer Moise MD;  [facility]: [facility]     ESOPHAGOSCOPY, GASTROSCOPY, DUODENOSCOPY (EGD), COMBINED N/A 6/1/2018    Procedure: COMBINED ESOPHAGOSCOPY, GASTROSCOPY, DUODENOSCOPY (EGD), REMOVE FOREIGN BODY;  COMBINED ESOPHAGOSCOPY, GASTROSCOPY, DUODENOSCOPY with removal of foreign body, propofol sedation by anesthesia;  Surgeon: Brice Martinez MD;  Location:  GI     ESOPHAGOSCOPY, GASTROSCOPY, DUODENOSCOPY (EGD), COMBINED N/A 7/25/2018    Procedure: COMBINED ESOPHAGOSCOPY, GASTROSCOPY, DUODENOSCOPY (EGD), REMOVE FOREIGN BODY;;  Surgeon: Candy Castelan MD;  Location:  GI     ESOPHAGOSCOPY, GASTROSCOPY, DUODENOSCOPY (EGD), COMBINED N/A 7/28/2018    Procedure: COMBINED ESOPHAGOSCOPY, GASTROSCOPY, DUODENOSCOPY (EGD), REMOVE FOREIGN BODY;  COMBINED ESOPHAGOSCOPY, GASTROSCOPY, DUODENOSCOPY (EGD), REMOVE FOREIGN BODY;  Surgeon: Brice Guzmán MD;  [facility]     ESOPHAGOSCOPY, GASTROSCOPY, DUODENOSCOPY (EGD), COMBINED N/A 7/31/2018    Procedure: COMBINED ESOPHAGOSCOPY, GASTROSCOPY, DUODENOSCOPY (EGD);  COMBINED ESOPHAGOSCOPY, GASTROSCOPY, DUODENOSCOPY (EGD) TO REMOVE FOREIGN BODY;  Surgeon: Lokehs Paula MD;  [facility]     ESOPHAGOSCOPY, GASTROSCOPY, DUODENOSCOPY (EGD), COMBINED N/A 8/4/2018    Procedure: COMBINED ESOPHAGOSCOPY, GASTROSCOPY, DUODENOSCOPY (EGD), REMOVE FOREIGN BODY;    combined esophagoscopy, gastroscopy, duodenoscopy, REMOVE FOREIGN BODY ;  Surgeon: Lokesh Paula MD;  [facility]     ESOPHAGOSCOPY, GASTROSCOPY, DUODENOSCOPY (EGD), COMBINED N/A 10/6/2019    Procedure: ESOPHAGOGASTRODUODENOSCOPY (EGD) with fireign body removal x2, esophageal stent placement with floroscopy;  Surgeon: Timoteo Espana MD;  [facility]: [facility]     ESOPHAGOSCOPY, GASTROSCOPY, DUODENOSCOPY (EGD), COMBINED N/A 12/2/2019    Procedure: Esophagogastroduodenoscopy with esophageal stent removal, esophogram;  Surgeon: Kailee Tena MD;  [facility]: [facility]     ESOPHAGOSCOPY, GASTROSCOPY, DUODENOSCOPY (EGD), COMBINED N/A 12/17/2019    Procedure: ESOPHAGOGASTRODUODENOSCOPY, WITH FOREIGN BODY REMOVAL;  Surgeon: Pamela Perez MD;  [facility]     ESOPHAGOSCOPY, GASTROSCOPY, DUODENOSCOPY (EGD), COMBINED N/A 12/13/2019    Procedure: ESOPHAGOGASTRODUODENOSCOPY, WITH FOREIGN BODY REMOVAL;  Surgeon: Samia Stanton MD;  [facility]     ESOPHAGOSCOPY, GASTROSCOPY, DUODENOSCOPY (EGD), COMBINED N/A 12/28/2019    Procedure: ESOPHAGOGASTRODUODENOSCOPY (EGD) Removal of Foreign Body X 2;  Surgeon: Huy Kelley MD;  [facility]     ESOPHAGOSCOPY, GASTROSCOPY, DUODENOSCOPY (EGD), COMBINED N/A 1/5/2020    Procedure: ESOPHAGOGASTRODUOENOSCOPY WITH FOREIGN BODY REMOVAL;  Surgeon: Pamela Perez MD;  [facility]     ESOPHAGOSCOPY, GASTROSCOPY, DUODENOSCOPY (EGD), COMBINED N/A 1/3/2020    Procedure: ESOPHAGOGASTRODUODENOSCOPY (EGD) REMOVAL OF FOREIGN BODY.;  Surgeon: Pamela Perez MD;  [facility]     ESOPHAGOSCOPY, GASTROSCOPY, DUODENOSCOPY (EGD), COMBINED N/A 1/13/2020    Procedure: ESOPHAGOGASTRODUODENOSCOPY (EGD) for foreign body removal;  Surgeon: Lokesh Paula MD;  [facility]     ESOPHAGOSCOPY, GASTROSCOPY, DUODENOSCOPY (EGD), COMBINED N/A 1/18/2020    Procedure: Diagnostic ESOPHAGOGASTRODUODENOSCOPY (EGD;  Surgeon: Chai,  Lokesh Gonzalez MD;  [facility]     ESOPHAGOSCOPY, GASTROSCOPY, DUODENOSCOPY (EGD), COMBINED N/A 3/29/2020    Procedure: UPPER ENDOSCOPY WITH FOREIGN BODY REMOVAL;  Surgeon: Doug Hansen MD;  [facility]     ESOPHAGOSCOPY, GASTROSCOPY, DUODENOSCOPY (EGD), COMBINED N/A 7/11/2020    Procedure: ESOPHAGOGASTRODUODENOSCOPY (EGD); Upper Endoscopy WITH FOREIGN BODY REMOVAL;  Surgeon: Lyndsey Mendoza DO;  [facility]: [facility]     ESOPHAGOSCOPY, GASTROSCOPY, DUODENOSCOPY (EGD), COMBINED N/A 7/29/2020    Procedure: ESOPHAGOGASTRODUODENOSCOPY REMOVAL OF FOREIGN BODY;  Surgeon: Samia Stanton MD;  [facility]     EXAM UNDER ANESTHESIA ANUS N/A 1/10/2017    Procedure: EXAM UNDER ANESTHESIA ANUS;  Surgeon: Annmarie Haynes MD;  [facility]     EXAM UNDER ANESTHESIA RECTUM N/A 7/19/2018    Procedure: EXAM UNDER ANESTHESIA RECTUM;  EXAM UNDER ANESTHESIA, REMOVAL OF RECTAL FOREIGN BODY;  Surgeon: Annmarie Haynes MD;  [facility]: [facility]     HC REMOVE FECAL IMPACTION OR FB W ANESTHESIA N/A 12/18/2016    Procedure: REMOVE FECAL IMPACTION/FOREIGN BODY UNDER ANESTHESIA;  Surgeon: Soham Cano MD;  Location:  OR     KNEE SURGERY - removed a small tissue mass from knee Right      LAPAROSCOPIC ABLATION ENDOMETRIOSIS       LAPAROTOMY EXPLORATORY N/A 1/10/2017    Procedure: LAPAROTOMY EXPLORATORY;  Surgeon: Annmarie Haynes MD;  Location: [facility]     LAPAROTOMY EXPLORATORY  09/11/2019    Manual manipulation of bowel to remove pill bottle in rectum     lymph nodes removed from neck; benign  age 7     MAMMOPLASTY REDUCTION Bilateral      RELEASE CARPAL TUNNEL Bilateral      SIGMOIDOSCOPY FLEXIBLE N/A 1/10/2017    Procedure: SIGMOIDOSCOPY FLEXIBLE;  Surgeon: Annmarie Haynes MD;  Location: [facility]     SIGMOIDOSCOPY FLEXIBLE N/A 5/8/2018    Procedure: SIGMOIDOSCOPY FLEXIBLE;  flex sig with foreign body removal using snare and rattooth forcep;  Surgeon: Soham Cano MD;  Location:   GI     SIGMOIDOSCOPY FLEXIBLE N/A 2/20/2019    Procedure: Exam under anesthesia Colonoscopy with attempted  removal of rectal foreign body;  Surgeon: Estrada Chávez MD;  Location: [facility]       Past medical history, past surgical history, medications, and allergies were reviewed with the patient. Additional pertinent items: None    FAMILY HISTORY:   Family History   Problem Relation Age of Onset     Diabetes Type 2  Maternal Grandmother      Diabetes Type 2  Paternal Grandmother      Breast Cancer Paternal Grandmother      Cerebrovascular Disease Father 53     Myocardial Infarction No family hx of      Coronary Artery Disease Early Onset No family hx of      Ovarian Cancer No family hx of      Colon Cancer No family hx of        SOCIAL HISTORY:   Social History     Tobacco Use     Smoking status: Never Smoker     Smokeless tobacco: Never Used   Substance Use Topics     Alcohol use: No     Alcohol/week: 0.0 standard drinks     Social history was reviewed with the patient. Additional pertinent items: None      Patient's Medications   New Prescriptions    No medications on file   Previous Medications    ACETAMINOPHEN (TYLENOL) 32 MG/ML LIQUID    Take 31.25 mLs (1,000 mg) by mouth every 6 hours as needed for fever or pain    ALBUTEROL (PROAIR HFA/PROVENTIL HFA/VENTOLIN HFA) 108 (90 BASE) MCG/ACT INHALER    Inhale 2 puffs into the lungs as needed for shortness of breath / dyspnea or wheezing    BUSPIRONE (BUSPAR) 10 MG TABLET    Take 1 tablet (10 mg) by mouth twice daily    CHOLECALCIFEROL (VITAMIN D) 50 MCG (2000 UT) CAPS    Take 2,000 Units by mouth daily     CLONIDINE (CATAPRES) 0.1 MG TABLET    Take 0.1 mg by mouth 2 times daily     DESVENLAFAXINE (PRISTIQ) 100 MG 24 HR TABLET    Take 1 tablet (100 mg) by mouth every morning    DOCOSANOL (ABREVA) 10 % CREA CREAM    Apply to lip 5 times a day as soon as symptoms begin, do not use for more than 10 days. Used PRN.    HYDROXYCHLOROQUINE (PLAQUENIL) 200 MG TABLET    Take  200 mg by mouth 2 times daily    HYDROXYZINE (ATARAX) 50 MG TABLET    Take 1 tablet (50 mg) by mouth 3 times daily as needed for anxiety    LURASIDONE (LATUDA) 60 MG TABS TABLET    Take 1 tablet (60 mg) by mouth at bedtime    METFORMIN (GLUCOPHAGE-XR) 500 MG 24 HR TABLET    Take 1,000 mg by mouth Take 1 tablet (500 mg) by mouth daily     NORETHINDRONE (MICRONOR) 0.35 MG TABLET    Take 0.35 mg by mouth daily    ONDANSETRON (ZOFRAN-ODT) 4 MG ODT TAB    Take 4 mg by mouth every 6 hours as needed for nausea or vomiting     PANTOPRAZOLE (PROTONIX) 40 MG EC TABLET    Take 1 tablet (40 mg) by mouth daily    PREGABALIN (LYRICA) 50 MG CAPSULE    Take 50 mg by mouth 3 times daily    PRENATAL VIT-FE FUMARATE-FA (PNV PRENATAL PLUS MULTIVITAMIN) 27-1 MG TABS PER TABLET    Take 1 tablet by mouth daily    TOPIRAMATE (TOPAMAX) 100 MG TABLET    Take 50 mg by mouth Take 1 tablet (100 mg) by mouth daily at bedtime    Modified Medications    No medications on file   Discontinued Medications    No medications on file          Allergies   Allergen Reactions     Amoxicillin-Pot Clavulanate Other (See Comments), Swelling and Rash     PN: facial swelling, left side. Also had cortisone injection the same day as she started the Augmentin.  Noted in downtime recovery of chart.       Penicillins Anaphylaxis     Vancomycin Itching, Swelling and Rash     Other reaction(s): Redness  Flushed face, very itchy; HUT Comment: Flushed face, very itchy; HUT Reaction: Angioedema; HUT Reaction: Redness; HUT Severity: Med; HUT Noted: [id number]       Hydrocodone Nausea and Vomiting and GI Disturbance     vomiting for days, PN: vomiting for days; HUT Comment: vomiting for days; HUT Reaction: Gastrointestinal; HUT Reaction: Nausea And Vomiting; HUT Reaction Type: Intolerance; HUT Severity: Med; HUT Noted: [id number]  vomiting for days       Blood-Group Specific Substance Other (See Comments)     Patient has an anti-Cw and non-specific antibodies. Blood product  "orders may be delayed. Draw one red top and two purple top tubes for all type/screen/crossmatch orders.     Influenza Vaccines Other (See Comments)     Oseltamivir Hives     med stopped, PN: med stopped     Cephalosporins Rash     Lamotrigine Rash     Possibly associated with Lamictal.   HUT Comment: Possibly associated with Lamictal. ; HUT Reaction: Rash; HUT Reaction Type: Allergy; HUT Severity: Low; HUT Noted: [id number]     Latex Rash        Review of Systems  A complete review of systems was performed with pertinent positives and negatives noted in the HPI, and all other systems negative.    Physical Exam   BP: (!) 125/97  Pulse: 96  Temp: 98.5  F (36.9  C)  Resp: 16  Height: 157.5 cm (5' 2\")  Weight: 115.7 kg (255 lb)  SpO2: 95 %      Physical Exam  Constitutional:       General: She is not in acute distress.     Appearance: She is well-developed. She is obese. She is not diaphoretic.   HENT:      Head: Normocephalic and atraumatic.      Mouth/Throat:      Pharynx: No oropharyngeal exudate.   Eyes:      General: No scleral icterus.        Right eye: No discharge.         Left eye: No discharge.      Pupils: Pupils are equal, round, and reactive to light.   Neck:      Musculoskeletal: Normal range of motion and neck supple.   Cardiovascular:      Rate and Rhythm: Normal rate and regular rhythm.      Heart sounds: Normal heart sounds. No murmur. No friction rub. No gallop.    Pulmonary:      Effort: Pulmonary effort is normal. No respiratory distress.      Breath sounds: Normal breath sounds. No wheezing.   Chest:      Chest wall: No tenderness.   Abdominal:      General: Bowel sounds are normal. There is no distension.      Palpations: Abdomen is soft.      Tenderness: There is no abdominal tenderness.   Musculoskeletal: Normal range of motion.         General: No tenderness or deformity.   Skin:     General: Skin is warm and dry.      Coloration: Skin is not pale.      Findings: No erythema or rash. "   Neurological:      Mental Status: She is alert and oriented to person, place, and time.      Cranial Nerves: No cranial nerve deficit.         ED Course        Procedures                           No results found for this or any previous visit (from the past 24 hour(s)).  Medications - No data to display          Assessments & Plan (with Medical Decision Making)   Is a 29-year-old female with a history of foreign body ingestion who presents after swallowing a strange paperclip.  This occurred at 1800 today.  Patient denies any attempts at self-harm, states that she is experiencing an urge to swallow foreign bodies throughout the afternoon.  He has minor left upper quadrant pain.  Exam demonstrates no acute abnormalities.  Abdomen is soft and nontender.  X-ray shows foreign body likely in stomach.  I discussed the case with GI who plans for endoscopic removal. We will admit for further monitoring work-up and treatment.    I have reviewed the nursing notes.    I have reviewed the findings, diagnosis, plan and need for follow up with the patient.    New Prescriptions    No medications on file       Final diagnoses:   None       8/1/2020   [facility], [place], EMERGENCY DEPARTMENT     Dejon Rodriguez,   08/02/20 0002

## 2020-08-02 NOTE — ANESTHESIA CARE TRANSFER NOTE
Patient: Nevin Enriquejay    Procedure(s):  ESOPHAGOGASTRODUODENOSCOPY, WITH FOREIGN BODY REMOVAL    Diagnosis: Foreign body in digestive tract, subsequent encounter [T18.9XXD]  Diagnosis Additional Information: No value filed.    Anesthesia Type:   No value filed.     Note:  Airway :Nasal Cannula  Patient transferred to:PACU  Handoff Report: Identifed the Patient, Identified the Reponsible Provider, Reviewed the pertinent medical history, Discussed the surgical course, Reviewed Intra-OP anesthesia mangement and issues during anesthesia, Set expectations for post-procedure period and Allowed opportunity for questions and acknowledgement of understanding      Vitals: (Last set prior to Anesthesia Care Transfer)    CRNA VITALS  8/2/2020 0039 - 8/2/2020 0112      8/2/2020             NIBP:  141/87    Pulse:  84    NIBP Mean:  103    Ht Rate:  84    SpO2:  99 %                Electronically Signed By: HU Sanchez CRNA  August 2, 2020  1:12 AM

## 2020-08-02 NOTE — PLAN OF CARE
VSS on RA, afebrile. Regular diet, tolerating well. Denies pain. Pt discharged home with outpatient psych follow-up scheduled. Port de-accessed, AVS reviewed, belongings gathered.

## 2020-08-02 NOTE — H&P
[facility], [place]    History and Physical - Hospitalist Service, Gold Night       Date of Admission:  8/1/2020    Assessment & Plan   Nevin Alvarado is a 29 year old woman admitted on 7/11/2020. She has a history of pulmonary embolus, BPD, ADD, anorexia, PTSD and foreign body ingestion.  I admitted her 7/11 with an ingested paper clip, 7/28 with an ingested pen spring and she is now being admitted with another ingested paper clip.     1) Ingested paper clip - Patient ingested a paper clip today during an episode of anxiety.  She has had multiple admissions for foreign body ingestion and is familiar to our service.  She had several months without any issues earlier this year but is now being admitted roughly every week with ingestions.  - Consult psychiatry.  We need to figure out a plan to help break her currently worsening cycle.  Would consider inpatient psychiatric admission  - Consult GI for potential EGD  - NPO save for ice chips and meds, LR @ 75 ccs/hr  - Tylenol PRN pain  - Patient previously had a guardian but this has been ended by the court  - Sitter, swallow precautions  - COVID screen prior to procedure  - Absolutely no IV opiates for pain.     2) History of PTSD, BPD, ADD  - Continue home buspirone, desvenlafaxine, hydroxyzine, lurasidone, pregabalin, topiramate     3) History of pulmonary embolism  - No longer on anticoagulation     4) History of granuloma annulare  - Continue Plaquenil     5) Metabolic syndrome  - Continue metformin     Diet: NPO c meds, ice chips  DVT Prophylaxis: Pneumatic Compression Devices  Hazel Catheter: not present  Code Status: Full         Disposition Plan   Expected discharge: Tomorrow, recommended to prior living arrangement once seen by GI.  Entered: HU Davis CNP 08/01/2020, 7:44 PM     The patient's care was discussed with the Attending Physician, Dr. Pena.    HU Davis CNP  [facility]  [facility], [place]  Pager: [phone]  Please see sticky note for cross cover information  ______________________________________________________________________    Chief Complaint   Ingested paper clip    History is obtained from the patient    History of Present Illness   Nevin Alvarado is a 29 year old woman admitted on 7/11/2020. She has a history of pulmonary embolus, BPD, ADD, anorexia, PTSD and foreign body ingestion.  I admitted her 7/11 with an ingested paper clip, 7/28 with an ingested pen spring and she is now being admitted with another ingested paper clip.    The patient reports that today she woke from a nap with self-harm thoughts and felt compelled to unfold and swallow a paper clip.  She reports that in the past she has had clusters of swallowing episodes like this and she is hopeful she can break the cycle.     During her last admission she saw psychiatry here and we moved up her appointment to see her outpatient psychiatrist.    Review of Systems    The 10 point Review of Systems is negative other than noted in the HPI or here.     Past Medical History    I have reviewed this patient's medical history and updated it with pertinent information if needed.   Past Medical History:   Diagnosis Date     ADD (attention deficit disorder)      Anorexia nervosa with bulimia     history of; on Topamax     Anxiety      Borderline personality disorder (H)      Depression      H/O self-harm      History of pulmonary embolism 12/2019    Provoked. Completed 3 month course of Apixaban     Morbid obesity (H)      Neuropathy      PTSD (post-traumatic stress disorder)      Rectal foreign body - Recurrent issue, self placed      Swallowed foreign body - Recurrent issue, self placed        Past Surgical History   I have reviewed this patient's surgical history and updated it with pertinent information if needed.  Past Surgical History:   Procedure Laterality Date     COMBINED ESOPHAGOSCOPY,  GASTROSCOPY, DUODENOSCOPY (EGD), REPLACE ESOPHAGEAL STENT N/A 10/9/2019    Procedure: Upper Endoscopy with Suture Placement;  Surgeon: Zurdo Ramirez MD;  [facility]: [facility]     ESOPHAGOSCOPY, GASTROSCOPY, DUODENOSCOPY (EGD), COMBINED N/A 3/9/2017    Procedure: COMBINED ESOPHAGOSCOPY, GASTROSCOPY, DUODENOSCOPY (EGD), REMOVE FOREIGN BODY;  Surgeon: Avis Guzmán MD;  [facility]: [facility]     ESOPHAGOSCOPY, GASTROSCOPY, DUODENOSCOPY (EGD), COMBINED N/A 4/20/2017    Procedure: COMBINED ESOPHAGOSCOPY, GASTROSCOPY, DUODENOSCOPY (EGD), REMOVE FOREIGN BODY;  EGD removal Foregin body;  Surgeon: Lokesh Paula MD;  [facility]: [facility]     ESOPHAGOSCOPY, GASTROSCOPY, DUODENOSCOPY (EGD), COMBINED N/A 6/12/2017    Procedure: COMBINED ESOPHAGOSCOPY, GASTROSCOPY, DUODENOSCOPY (EGD);  COMBINED ESOPHAGOSCOPY, GASTROSCOPY, DUODENOSCOPY (EGD) [[id number]]attempted removal of foreign body;  Surgeon: Pamela Perez MD;  [facility]     ESOPHAGOSCOPY, GASTROSCOPY, DUODENOSCOPY (EGD), COMBINED N/A 6/9/2017    Procedure: COMBINED ESOPHAGOSCOPY, GASTROSCOPY, DUODENOSCOPY (EGD), REMOVE FOREIGN BODY;  Esophagoscopy, Gastroscopy, Duodenoscopy, Removal of Foreign Body;  Surgeon: Dejon Marsh MD;  [facility]     ESOPHAGOSCOPY, GASTROSCOPY, DUODENOSCOPY (EGD), COMBINED N/A 1/6/2018    Procedure: COMBINED ESOPHAGOSCOPY, GASTROSCOPY, DUODENOSCOPY (EGD), REMOVE FOREIGN BODY;  COMBINED ESOPHAGOSCOPY, GASTROSCOPY, DUODENOSCOPY (EGD) [by pascal net and snare with profol sedation;  Surgeon: Feliciano Emmanuel MD;  Location:  GI     ESOPHAGOSCOPY, GASTROSCOPY, DUODENOSCOPY (EGD), COMBINED N/A 3/19/2018    Procedure: COMBINED ESOPHAGOSCOPY, GASTROSCOPY, DUODENOSCOPY (EGD), REMOVE FOREIGN BODY;   Esophagodscopy, Gastroscopy, Duodenoscopy,Foreign Body Removal;  Surgeon: Brice Guzmán MD;  [facility]     ESOPHAGOSCOPY, GASTROSCOPY, DUODENOSCOPY (EGD), COMBINED N/A 4/16/2018    Procedure: COMBINED  ESOPHAGOSCOPY, GASTROSCOPY, DUODENOSCOPY (EGD), REMOVE FOREIGN BODY;  Esophagogastroduodenoscopy  Foreign Body Removal X 2;  Surgeon: Royer Moise MD;  [facility]     ESOPHAGOSCOPY, GASTROSCOPY, DUODENOSCOPY (EGD), COMBINED N/A 6/1/2018    Procedure: COMBINED ESOPHAGOSCOPY, GASTROSCOPY, DUODENOSCOPY (EGD), REMOVE FOREIGN BODY;  COMBINED ESOPHAGOSCOPY, GASTROSCOPY, DUODENOSCOPY with removal of foreign body, propofol sedation by anesthesia;  Surgeon: Brice Martinez MD;  Location:  GI     ESOPHAGOSCOPY, GASTROSCOPY, DUODENOSCOPY (EGD), COMBINED N/A 7/25/2018    Procedure: COMBINED ESOPHAGOSCOPY, GASTROSCOPY, DUODENOSCOPY (EGD), REMOVE FOREIGN BODY;;  Surgeon: Candy Castelan MD;  Location:  GI     ESOPHAGOSCOPY, GASTROSCOPY, DUODENOSCOPY (EGD), COMBINED N/A 7/28/2018    Procedure: COMBINED ESOPHAGOSCOPY, GASTROSCOPY, DUODENOSCOPY (EGD), REMOVE FOREIGN BODY;  COMBINED ESOPHAGOSCOPY, GASTROSCOPY, DUODENOSCOPY (EGD), REMOVE FOREIGN BODY;  Surgeon: Brice Guzmán MD;  [facility]     ESOPHAGOSCOPY, GASTROSCOPY, DUODENOSCOPY (EGD), COMBINED N/A 7/31/2018    Procedure: COMBINED ESOPHAGOSCOPY, GASTROSCOPY, DUODENOSCOPY (EGD);  COMBINED ESOPHAGOSCOPY, GASTROSCOPY, DUODENOSCOPY (EGD) TO REMOVE FOREIGN BODY;  Surgeon: Lokesh Paula MD;  [facility]     ESOPHAGOSCOPY, GASTROSCOPY, DUODENOSCOPY (EGD), COMBINED N/A 8/4/2018    Procedure: COMBINED ESOPHAGOSCOPY, GASTROSCOPY, DUODENOSCOPY (EGD), REMOVE FOREIGN BODY;   combined esophagoscopy, gastroscopy, duodenoscopy, REMOVE FOREIGN BODY ;  Surgeon: Lokesh Paula MD;  [facility]     ESOPHAGOSCOPY, GASTROSCOPY, DUODENOSCOPY (EGD), COMBINED N/A 10/6/2019    Procedure: ESOPHAGOGASTRODUODENOSCOPY (EGD) with fireign body removal x2, esophageal stent placement with floroscopy;  Surgeon: Timoteo Espana MD;  [facility]     ESOPHAGOSCOPY, GASTROSCOPY, DUODENOSCOPY (EGD), COMBINED N/A 12/2/2019    Procedure:  Esophagogastroduodenoscopy with esophageal stent removal, esophogram;  Surgeon: Kailee Tena MD;  [facility]     ESOPHAGOSCOPY, GASTROSCOPY, DUODENOSCOPY (EGD), COMBINED N/A 12/17/2019    Procedure: ESOPHAGOGASTRODUODENOSCOPY, WITH FOREIGN BODY REMOVAL;  Surgeon: Pamela Perez MD;  [facility]     ESOPHAGOSCOPY, GASTROSCOPY, DUODENOSCOPY (EGD), COMBINED N/A 12/13/2019    Procedure: ESOPHAGOGASTRODUODENOSCOPY, WITH FOREIGN BODY REMOVAL;  Surgeon: Samia Stanton MD;  [facility]     ESOPHAGOSCOPY, GASTROSCOPY, DUODENOSCOPY (EGD), COMBINED N/A 12/28/2019    Procedure: ESOPHAGOGASTRODUODENOSCOPY (EGD) Removal of Foreign Body X 2;  Surgeon: Huy Kelley MD;  [facility]     ESOPHAGOSCOPY, GASTROSCOPY, DUODENOSCOPY (EGD), COMBINED N/A 1/5/2020    Procedure: ESOPHAGOGASTRODUOENOSCOPY WITH FOREIGN BODY REMOVAL;  Surgeon: Pamela Perez MD;  [facility]     ESOPHAGOSCOPY, GASTROSCOPY, DUODENOSCOPY (EGD), COMBINED N/A 1/3/2020    Procedure: ESOPHAGOGASTRODUODENOSCOPY (EGD) REMOVAL OF FOREIGN BODY.;  Surgeon: Pamela Perez MD;  [facility]     ESOPHAGOSCOPY, GASTROSCOPY, DUODENOSCOPY (EGD), COMBINED N/A 1/13/2020    Procedure: ESOPHAGOGASTRODUODENOSCOPY (EGD) for foreign body removal;  Surgeon: Lokesh Paula MD;  [facility]     ESOPHAGOSCOPY, GASTROSCOPY, DUODENOSCOPY (EGD), COMBINED N/A 1/18/2020    Procedure: Diagnostic ESOPHAGOGASTRODUODENOSCOPY (EGD;  Surgeon: Lokesh Paula MD;  [facility]     ESOPHAGOSCOPY, GASTROSCOPY, DUODENOSCOPY (EGD), COMBINED N/A 3/29/2020    Procedure: UPPER ENDOSCOPY WITH FOREIGN BODY REMOVAL;  Surgeon: Doug Hansen MD;  [facility]     ESOPHAGOSCOPY, GASTROSCOPY, DUODENOSCOPY (EGD), COMBINED N/A 7/11/2020    Procedure: ESOPHAGOGASTRODUODENOSCOPY (EGD); Upper Endoscopy WITH FOREIGN BODY REMOVAL;  Surgeon: Lyndsey Mendoza DO;  [facility]     ESOPHAGOSCOPY,  GASTROSCOPY, DUODENOSCOPY (EGD), COMBINED N/A 7/29/2020    Procedure: ESOPHAGOGASTRODUODENOSCOPY REMOVAL OF FOREIGN BODY;  Surgeon: Samia Stanton MD;  [facility]     EXAM UNDER ANESTHESIA ANUS N/A 1/10/2017    Procedure: EXAM UNDER ANESTHESIA ANUS;  Surgeon: Annmarie Haynes MD;  [facility]     EXAM UNDER ANESTHESIA RECTUM N/A 7/19/2018    Procedure: EXAM UNDER ANESTHESIA RECTUM;  EXAM UNDER ANESTHESIA, REMOVAL OF RECTAL FOREIGN BODY;  Surgeon: Annmarie Haynes MD;  [facility]     HC REMOVE FECAL IMPACTION OR FB W ANESTHESIA N/A 12/18/2016    Procedure: REMOVE FECAL IMPACTION/FOREIGN BODY UNDER ANESTHESIA;  Surgeon: Soham Cano MD;  Location:  OR     KNEE SURGERY - removed a small tissue mass from knee Right      LAPAROSCOPIC ABLATION ENDOMETRIOSIS       LAPAROTOMY EXPLORATORY N/A 1/10/2017    Procedure: LAPAROTOMY EXPLORATORY;  Surgeon: Annmarie Haynes MD;  [facility]     LAPAROTOMY EXPLORATORY  09/11/2019    Manual manipulation of bowel to remove pill bottle in rectum     lymph nodes removed from neck; benign  age 7     MAMMOPLASTY REDUCTION Bilateral      RELEASE CARPAL TUNNEL Bilateral      SIGMOIDOSCOPY FLEXIBLE N/A 1/10/2017    Procedure: SIGMOIDOSCOPY FLEXIBLE;  Surgeon: Annmarie aHynes MD;  [facility]     SIGMOIDOSCOPY FLEXIBLE N/A 5/8/2018    Procedure: SIGMOIDOSCOPY FLEXIBLE;  flex sig with foreign body removal using snare and rattooth forcep;  Surgeon: Soham Cano MD;  Location:  GI     SIGMOIDOSCOPY FLEXIBLE N/A 2/20/2019    Procedure: Exam under anesthesia Colonoscopy with attempted  removal of rectal foreign body;  Surgeon: Estrada Chávez MD;  Location[facility]       Social History   I have reviewed this patient's social history and updated it with pertinent information if needed.      Family History   I have reviewed this patient's family history and updated it with pertinent information if needed.  Family History    Problem Relation Age of Onset     Diabetes Type 2  Maternal Grandmother      Diabetes Type 2  Paternal Grandmother      Breast Cancer Paternal Grandmother      Cerebrovascular Disease Father 53     Myocardial Infarction No family hx of      Coronary Artery Disease Early Onset No family hx of      Ovarian Cancer No family hx of      Colon Cancer No family hx of        Prior to Admission Medications   Prior to Admission Medications   Prescriptions Last Dose Informant Patient Reported? Taking?   Cholecalciferol (VITAMIN D) 50 MCG (2000 UT) CAPS 8/1/2020 at Unknown time Self Yes Yes   Sig: Take 2,000 Units by mouth daily    Prenatal Vit-Fe Fumarate-FA (PNV PRENATAL PLUS MULTIVITAMIN) 27-1 MG TABS per tablet 8/1/2020 at Unknown time  Yes Yes   Sig: Take 1 tablet by mouth daily   acetaminophen (TYLENOL) 32 mg/mL liquid Past Month at Unknown time  No Yes   Sig: Take 31.25 mLs (1,000 mg) by mouth every 6 hours as needed for fever or pain   albuterol (PROAIR HFA/PROVENTIL HFA/VENTOLIN HFA) 108 (90 Base) MCG/ACT inhaler Past Month at Unknown time  Yes Yes   Sig: Inhale 2 puffs into the lungs as needed for shortness of breath / dyspnea or wheezing   busPIRone (BUSPAR) 10 MG tablet 8/1/2020 at Unknown time Self Yes Yes   Sig: Take 1 tablet (10 mg) by mouth twice daily   cloNIDine (CATAPRES) 0.1 MG tablet 8/1/2020 at Unknown time Self Yes Yes   Sig: Take 0.1 mg by mouth 2 times daily    desvenlafaxine (PRISTIQ) 100 MG 24 hr tablet 8/1/2020 at Unknown time Self Yes Yes   Sig: Take 1 tablet (100 mg) by mouth every morning   docosanol (ABREVA) 10 % CREA cream Unknown at Unknown time  Yes No   Sig: Apply to lip 5 times a day as soon as symptoms begin, do not use for more than 10 days. Used PRN.   hydrOXYzine (ATARAX) 50 MG tablet Past Month at Unknown time  No Yes   Sig: Take 1 tablet (50 mg) by mouth 3 times daily as needed for anxiety   hydroxychloroquine (PLAQUENIL) 200 MG tablet 8/1/2020 at Unknown time  Yes Yes   Sig: Take  200 mg by mouth 2 times daily   lurasidone (LATUDA) 60 MG TABS tablet 7/31/2020 at Unknown time Self Yes Yes   Sig: Take 1 tablet (60 mg) by mouth at bedtime   metFORMIN (GLUCOPHAGE-XR) 500 MG 24 hr tablet 8/1/2020 at Unknown time Self Yes Yes   Sig: Take 1,000 mg by mouth Take 1 tablet (500 mg) by mouth daily    norethindrone (MICRONOR) 0.35 MG tablet 8/1/2020 at Unknown time  Yes Yes   Sig: Take 0.35 mg by mouth daily   ondansetron (ZOFRAN-ODT) 4 MG ODT tab Past Month at Unknown time  Yes Yes   Sig: Take 4 mg by mouth every 6 hours as needed for nausea or vomiting    pantoprazole (PROTONIX) 40 MG EC tablet Past Month at Unknown time  No Yes   Sig: Take 1 tablet (40 mg) by mouth daily   pregabalin (LYRICA) 50 MG capsule 8/1/2020 at Unknown time  Yes Yes   Sig: Take 50 mg by mouth 3 times daily   topiramate (TOPAMAX) 100 MG tablet 7/31/2020 at Unknown time Self Yes Yes   Sig: Take 50 mg by mouth Take 1 tablet (100 mg) by mouth daily at bedtime       Facility-Administered Medications: None     Allergies   Allergies   Allergen Reactions     Amoxicillin-Pot Clavulanate Other (See Comments), Swelling and Rash     PN: facial swelling, left side. Also had cortisone injection the same day as she started the Augmentin.  Noted in downtime recovery of chart.       Penicillins Anaphylaxis     Vancomycin Itching, Swelling and Rash     Other reaction(s): Redness  Flushed face, very itchy; HUT Comment: Flushed face, very itchy; HUT Reaction: Angioedema; HUT Reaction: Redness; HUT Severity: Med; HUT Noted: [id number]       Hydrocodone Nausea and Vomiting and GI Disturbance     vomiting for days, PN: vomiting for days; HUT Comment: vomiting for days; HUT Reaction: Gastrointestinal; HUT Reaction: Nausea And Vomiting; HUT Reaction Type: Intolerance; HUT Severity: Med; HUT Noted: [id number]  vomiting for days       Blood-Group Specific Substance Other (See Comments)     Patient has an anti-Cw and non-specific antibodies. Blood product  orders may be delayed. Draw one red top and two purple top tubes for all type/screen/crossmatch orders.     Influenza Vaccines Other (See Comments)     Oseltamivir Hives     med stopped, PN: med stopped     Cephalosporins Rash     Lamotrigine Rash     Possibly associated with Lamictal.   HUT Comment: Possibly associated with Lamictal. ; HUT Reaction: Rash; HUT Reaction Type: Allergy; HUT Severity: Low; HUT Noted: [id number]     Latex Rash       Physical Exam   Vital Signs: Temp: 98.5  F (36.9  C) Temp src: Oral BP: (!) 125/97 Pulse: 96   Resp: 16 SpO2: 95 % O2 Device: None (Room air)    Weight: 255 lbs 0 oz    Physical Exam   Constitutional:   Well nourished, well developed, resting comfortably   Head: Normocephalic and atraumatic.   Eyes: Conjunctivae are normal. Pupils are equal, round, and reactive to light.  Pharynx has no erythema or exudate, mucous membranes are moist  Neck:   No adenopathy, no bony tenderness  Cardiovascular: Regular rate and rhythm without murmurs or gallops  Pulmonary/Chest: Clear to auscultation bilaterally, with no wheezes or retractions. No respiratory distress.  GI: Soft with good bowel sounds.  Non-tender, non-distended, with no guarding, no rebound, no peritoneal signs.   Back:  No bony or CVA tenderness   Musculoskeletal:  No edema or clubbing   Skin: Skin is warm and dry. No rash noted.   Neurological: Alert and oriented to person, place, and time. Nonfocal exam  Psychiatric:  Normal mood and affect.      Data   Data reviewed today: I reviewed all medications, new labs and imaging results over the last 24 hours. I personally reviewed recent labs, notes and imaging.

## 2020-08-03 NOTE — PROGRESS NOTES
[facility]: Post-Discharge Note  SITUATION                                                      Admission:    Admission Date: 08/01/20   Reason for Admission: Ingestion of foreign object (paper clip)  Discharge:   Discharge Date: 08/02/20  Discharge Diagnosis: Ingestion of foreign object (paper clip)  Discharge Service: Hospitalist    BACKGROUND                                                      Nevin Alvarado is a 29 year old woman admitted on 7/11/2020. She has a history of pulmonary embolus, BPD, ADD, anorexia, PTSD and foreign body ingestion.  I admitted her 7/11 with an ingested paper clip, 7/28 with an ingested pen spring and she is now being admitted with another ingested paper clip.    ASSESSMENT      Discharge Assessment  Patient reports symptoms are: Unchanged  Does the patient have all of their medications?: Yes  Does patient know what their new medications are for?: Not applicable  Does patient have a follow-up appointment scheduled?: Yes  Does patient have any other questions or concerns?: No    Post-op  Did the patient have surgery or a procedure: Yes(EGD)  Fever: No  Chills: No  Eating & Drinking: eating and drinking without complaints/concerns  PO Intake: regular diet  Bowel Function: normal  Urinary Status: voiding without complaint/concerns    PLAN                                                      Outpatient Plan:      Follow up with your psychiatrist and care team as outpatient as previously scheduled.     No future appointments.        Saskia Parsons, CMA

## 2020-08-09 NOTE — ANESTHESIA POSTPROCEDURE EVALUATION
Patient: Nevin Alvarado    EXAM UNDER ANESTHESIA ANUS (N/A Rectum)  LAPAROTOMY EXPLORATORY (N/A Abdomen)  Additional InformationProcedure(s):  Exam under anesthesia, flexible sigmoidoscopy, rigid sigmoidproctoscopy, exploratory laparotomy, extraction of rectal foreign body. - Wound Class: IV-Dirty or Infected   - Wound Class: I-Clean    Diagnosis:Colorectal Foreign Body  Diagnosis Additional Information: No value filed.    Anesthesia Type:  General, ETT    Note:  Anesthesia Post Evaluation    Patient location during evaluation: PACU  Patient participation: Able to fully participate in evaluation  Level of consciousness: awake  Pain management: satisfactory to patient  Airway patency: patent  Cardiovascular status: acceptable  Respiratory status: acceptable  Hydration status: acceptable  PONV: none     Anesthetic complications: None          Last vitals:  Filed Vitals:    01/10/17 2040 01/10/17 2041 01/10/17 2115   BP:   133/83   Pulse:      Temp:   36.8  C (98.2  F)   Resp:   18   SpO2: 100% 96% 96%       Electronically Signed By: Siddharth Burrell MD  January 11, 2017  12:27 AM  
week(s)

## 2020-08-14 ENCOUNTER — HOSPITAL ENCOUNTER (OUTPATIENT)
Dept: SURGERY | Facility: CLINIC | Age: 30
Discharge: HOME | End: 2020-08-15
Attending: INTERNAL MEDICINE | Admitting: INTERNAL MEDICINE

## 2020-08-14 ENCOUNTER — SURGERY - HEALTHEAST (OUTPATIENT)
Dept: SURGERY | Facility: CLINIC | Age: 30
End: 2020-08-14

## 2020-08-14 ENCOUNTER — ANESTHESIA - HEALTHEAST (OUTPATIENT)
Dept: SURGERY | Facility: CLINIC | Age: 30
End: 2020-08-14

## 2020-08-14 DIAGNOSIS — T18.9XXA SWALLOWED FOREIGN BODY, INITIAL ENCOUNTER: ICD-10-CM

## 2020-08-14 DIAGNOSIS — F60.3 BORDERLINE PERSONALITY DISORDER (H): ICD-10-CM

## 2020-08-14 LAB
ANION GAP SERPL CALCULATED.3IONS-SCNC: 8 MMOL/L (ref 5–18)
APTT PPP: 71 SECONDS (ref 24–37)
BASOPHILS # BLD AUTO: 0 THOU/UL (ref 0–0.2)
BASOPHILS NFR BLD AUTO: 0 % (ref 0–2)
BUN SERPL-MCNC: 9 MG/DL (ref 8–22)
CALCIUM SERPL-MCNC: 9.3 MG/DL (ref 8.5–10.5)
CHLORIDE BLD-SCNC: 109 MMOL/L (ref 98–107)
CO2 SERPL-SCNC: 22 MMOL/L (ref 22–31)
CREAT SERPL-MCNC: 0.71 MG/DL (ref 0.6–1.1)
EOSINOPHIL # BLD AUTO: 0.1 THOU/UL (ref 0–0.4)
EOSINOPHIL NFR BLD AUTO: 1 % (ref 0–6)
ERYTHROCYTE [DISTWIDTH] IN BLOOD BY AUTOMATED COUNT: 14.7 % (ref 11–14.5)
GFR SERPL CREATININE-BSD FRML MDRD: >60 ML/MIN/1.73M2
GLUCOSE BLD-MCNC: 102 MG/DL (ref 70–125)
HCG SERPL QL: NEGATIVE
HCT VFR BLD AUTO: 36.4 % (ref 35–47)
HGB BLD-MCNC: 11.9 G/DL (ref 12–16)
INR PPP: 1 (ref 0.9–1.1)
LYMPHOCYTES # BLD AUTO: 1.9 THOU/UL (ref 0.8–4.4)
LYMPHOCYTES NFR BLD AUTO: 19 % (ref 20–40)
MCH RBC QN AUTO: 28.3 PG (ref 27–34)
MCHC RBC AUTO-ENTMCNC: 32.7 G/DL (ref 32–36)
MCV RBC AUTO: 87 FL (ref 80–100)
MONOCYTES # BLD AUTO: 0.7 THOU/UL (ref 0–0.9)
MONOCYTES NFR BLD AUTO: 7 % (ref 2–10)
NEUTROPHILS # BLD AUTO: 7.3 THOU/UL (ref 2–7.7)
NEUTROPHILS NFR BLD AUTO: 73 % (ref 50–70)
PLATELET # BLD AUTO: 254 THOU/UL (ref 140–440)
PMV BLD AUTO: 9.9 FL (ref 8.5–12.5)
POTASSIUM BLD-SCNC: 3.7 MMOL/L (ref 3.5–5)
RBC # BLD AUTO: 4.2 MILL/UL (ref 3.8–5.4)
SODIUM SERPL-SCNC: 139 MMOL/L (ref 136–145)
WBC: 10 THOU/UL (ref 4–11)

## 2020-08-14 ASSESSMENT — MIFFLIN-ST. JEOR
SCORE: 1834.92
SCORE: 1834.92

## 2020-08-18 ENCOUNTER — ANESTHESIA (OUTPATIENT)
Dept: SURGERY | Facility: CLINIC | Age: 30
End: 2020-08-18
Payer: COMMERCIAL

## 2020-08-18 ENCOUNTER — ANESTHESIA EVENT (OUTPATIENT)
Dept: SURGERY | Facility: CLINIC | Age: 30
End: 2020-08-18
Payer: COMMERCIAL

## 2020-08-18 ENCOUNTER — APPOINTMENT (OUTPATIENT)
Dept: GENERAL RADIOLOGY | Facility: CLINIC | Age: 30
End: 2020-08-18
Attending: EMERGENCY MEDICINE
Payer: COMMERCIAL

## 2020-08-18 ENCOUNTER — HOSPITAL ENCOUNTER (OUTPATIENT)
Facility: CLINIC | Age: 30
Discharge: HOME | End: 2020-08-19
Attending: EMERGENCY MEDICINE | Admitting: INTERNAL MEDICINE
Payer: COMMERCIAL

## 2020-08-18 DIAGNOSIS — Z20.822 COVID-19 RULED OUT BY LABORATORY TESTING: ICD-10-CM

## 2020-08-18 DIAGNOSIS — F41.9 ANXIETY DISORDER, UNSPECIFIED TYPE: Primary | ICD-10-CM

## 2020-08-18 DIAGNOSIS — T18.2XXA FOREIGN BODY IN STOMACH, INITIAL ENCOUNTER: ICD-10-CM

## 2020-08-18 DIAGNOSIS — T18.9XXA SWALLOWED FOREIGN BODY, INITIAL ENCOUNTER: ICD-10-CM

## 2020-08-18 LAB
ANION GAP SERPL CALCULATED.3IONS-SCNC: 6 MMOL/L (ref 3–14)
BUN SERPL-MCNC: 11 MG/DL (ref 7–30)
CALCIUM SERPL-MCNC: 8.8 MG/DL (ref 8.5–10.1)
CHLORIDE SERPL-SCNC: 109 MMOL/L (ref 94–109)
CO2 SERPL-SCNC: 25 MMOL/L (ref 20–32)
CREAT SERPL-MCNC: 0.61 MG/DL (ref 0.52–1.04)
GFR SERPL CREATININE-BSD FRML MDRD: >90 ML/MIN/{1.73_M2}
GLUCOSE SERPL-MCNC: 121 MG/DL (ref 70–99)
POTASSIUM SERPL-SCNC: 3.5 MMOL/L (ref 3.4–5.3)
SARS-COV-2 RNA SPEC QL NAA+PROBE: NORMAL
SODIUM SERPL-SCNC: 140 MMOL/L (ref 133–144)
SPECIMEN SOURCE: NORMAL

## 2020-08-18 PROCEDURE — 25000128 H RX IP 250 OP 636: Performed by: STUDENT IN AN ORGANIZED HEALTH CARE EDUCATION/TRAINING PROGRAM

## 2020-08-18 PROCEDURE — 99285 EMERGENCY DEPT VISIT HI MDM: CPT | Performed by: EMERGENCY MEDICINE

## 2020-08-18 PROCEDURE — 25800030 ZZH RX IP 258 OP 636: Performed by: STUDENT IN AN ORGANIZED HEALTH CARE EDUCATION/TRAINING PROGRAM

## 2020-08-18 PROCEDURE — 74018 RADEX ABDOMEN 1 VIEW: CPT

## 2020-08-18 PROCEDURE — 36000055 ZZH SURGERY LEVEL 2 W FLUORO 1ST 30 MIN - UMMC: Performed by: INTERNAL MEDICINE

## 2020-08-18 PROCEDURE — 80048 BASIC METABOLIC PNL TOTAL CA: CPT | Performed by: EMERGENCY MEDICINE

## 2020-08-18 PROCEDURE — 99285 EMERGENCY DEPT VISIT HI MDM: CPT | Mod: Z6 | Performed by: EMERGENCY MEDICINE

## 2020-08-18 PROCEDURE — 36000053 ZZH SURGERY LEVEL 2 EA 15 ADDTL MIN - UMMC: Performed by: INTERNAL MEDICINE

## 2020-08-18 PROCEDURE — 25000125 ZZHC RX 250: Performed by: STUDENT IN AN ORGANIZED HEALTH CARE EDUCATION/TRAINING PROGRAM

## 2020-08-18 PROCEDURE — 37000009 ZZH ANESTHESIA TECHNICAL FEE, EACH ADDTL 15 MIN: Performed by: INTERNAL MEDICINE

## 2020-08-18 PROCEDURE — 27211024 ZZHC OR SUPPLY OTHER OPNP: Performed by: INTERNAL MEDICINE

## 2020-08-18 PROCEDURE — 27210794 ZZH OR GENERAL SUPPLY STERILE: Performed by: INTERNAL MEDICINE

## 2020-08-18 PROCEDURE — C9803 HOPD COVID-19 SPEC COLLECT: HCPCS | Performed by: EMERGENCY MEDICINE

## 2020-08-18 PROCEDURE — 25000566 ZZH SEVOFLURANE, EA 15 MIN: Performed by: INTERNAL MEDICINE

## 2020-08-18 PROCEDURE — 71000014 ZZH RECOVERY PHASE 1 LEVEL 2 FIRST HR: Performed by: INTERNAL MEDICINE

## 2020-08-18 PROCEDURE — 37000008 ZZH ANESTHESIA TECHNICAL FEE, 1ST 30 MIN: Performed by: INTERNAL MEDICINE

## 2020-08-18 PROCEDURE — 25000125 ZZHC RX 250: Performed by: INTERNAL MEDICINE

## 2020-08-18 PROCEDURE — U0003 INFECTIOUS AGENT DETECTION BY NUCLEIC ACID (DNA OR RNA); SEVERE ACUTE RESPIRATORY SYNDROME CORONAVIRUS 2 (SARS-COV-2) (CORONAVIRUS DISEASE [COVID-19]), AMPLIFIED PROBE TECHNIQUE, MAKING USE OF HIGH THROUGHPUT TECHNOLOGIES AS DESCRIBED BY CMS-2020-01-R: HCPCS | Performed by: NURSE PRACTITIONER

## 2020-08-18 RX ORDER — FENTANYL CITRATE 50 UG/ML
INJECTION, SOLUTION INTRAMUSCULAR; INTRAVENOUS PRN
Status: DISCONTINUED | OUTPATIENT
Start: 2020-08-18 | End: 2020-08-19

## 2020-08-18 RX ORDER — LIDOCAINE HYDROCHLORIDE 20 MG/ML
INJECTION, SOLUTION INFILTRATION; PERINEURAL PRN
Status: DISCONTINUED | OUTPATIENT
Start: 2020-08-18 | End: 2020-08-19

## 2020-08-18 RX ADMIN — PROPOFOL 240 MG: 10 INJECTION, EMULSION INTRAVENOUS at 23:38

## 2020-08-18 RX ADMIN — FENTANYL CITRATE 100 MCG: 50 INJECTION, SOLUTION INTRAMUSCULAR; INTRAVENOUS at 23:37

## 2020-08-18 RX ADMIN — SODIUM CHLORIDE, POTASSIUM CHLORIDE, SODIUM LACTATE AND CALCIUM CHLORIDE: 600; 310; 30; 20 INJECTION, SOLUTION INTRAVENOUS at 23:25

## 2020-08-18 RX ADMIN — PHENYLEPHRINE HYDROCHLORIDE 100 MCG: 10 INJECTION INTRAVENOUS at 23:50

## 2020-08-18 RX ADMIN — MIDAZOLAM 1 MG: 1 INJECTION INTRAMUSCULAR; INTRAVENOUS at 23:36

## 2020-08-18 RX ADMIN — Medication 100 MG: at 23:39

## 2020-08-18 RX ADMIN — LIDOCAINE HYDROCHLORIDE 160 MG: 20 INJECTION, SOLUTION INFILTRATION; PERINEURAL at 23:37

## 2020-08-19 VITALS
DIASTOLIC BLOOD PRESSURE: 72 MMHG | SYSTOLIC BLOOD PRESSURE: 127 MMHG | TEMPERATURE: 98 F | OXYGEN SATURATION: 98 % | RESPIRATION RATE: 16 BRPM | HEART RATE: 82 BPM

## 2020-08-19 LAB
LABORATORY COMMENT REPORT: NORMAL
SARS-COV-2 RNA SPEC QL NAA+PROBE: NEGATIVE
SPECIMEN SOURCE: NORMAL
UPPER GI ENDOSCOPY: NORMAL

## 2020-08-19 PROCEDURE — 25000132 ZZH RX MED GY IP 250 OP 250 PS 637: Performed by: STUDENT IN AN ORGANIZED HEALTH CARE EDUCATION/TRAINING PROGRAM

## 2020-08-19 PROCEDURE — 25000128 H RX IP 250 OP 636: Performed by: INTERNAL MEDICINE

## 2020-08-19 PROCEDURE — G0378 HOSPITAL OBSERVATION PER HR: HCPCS

## 2020-08-19 PROCEDURE — 99217 ZZC OBSERVATION CARE DISCHARGE: CPT | Mod: GC | Performed by: INTERNAL MEDICINE

## 2020-08-19 PROCEDURE — 25000128 H RX IP 250 OP 636: Performed by: STUDENT IN AN ORGANIZED HEALTH CARE EDUCATION/TRAINING PROGRAM

## 2020-08-19 PROCEDURE — 99214 OFFICE O/P EST MOD 30 MIN: CPT | Performed by: NURSE PRACTITIONER

## 2020-08-19 PROCEDURE — 25800030 ZZH RX IP 258 OP 636: Performed by: STUDENT IN AN ORGANIZED HEALTH CARE EDUCATION/TRAINING PROGRAM

## 2020-08-19 RX ORDER — BUSPIRONE HYDROCHLORIDE 10 MG/1
10 TABLET ORAL 2 TIMES DAILY
Status: DISCONTINUED | OUTPATIENT
Start: 2020-08-19 | End: 2020-08-19

## 2020-08-19 RX ORDER — HEPARIN SODIUM,PORCINE 10 UNIT/ML
5-10 VIAL (ML) INTRAVENOUS
Status: DISCONTINUED | OUTPATIENT
Start: 2020-08-19 | End: 2020-08-19 | Stop reason: HOSPADM

## 2020-08-19 RX ORDER — DEXAMETHASONE SODIUM PHOSPHATE 4 MG/ML
INJECTION, SOLUTION INTRA-ARTICULAR; INTRALESIONAL; INTRAMUSCULAR; INTRAVENOUS; SOFT TISSUE PRN
Status: DISCONTINUED | OUTPATIENT
Start: 2020-08-19 | End: 2020-08-19

## 2020-08-19 RX ORDER — NALOXONE HYDROCHLORIDE 0.4 MG/ML
.1-.4 INJECTION, SOLUTION INTRAMUSCULAR; INTRAVENOUS; SUBCUTANEOUS
Status: DISCONTINUED | OUTPATIENT
Start: 2020-08-19 | End: 2020-08-19 | Stop reason: HOSPADM

## 2020-08-19 RX ORDER — ACETAMINOPHEN AND CODEINE PHOSPHATE 120; 12 MG/5ML; MG/5ML
0.35 SOLUTION ORAL DAILY
Status: DISCONTINUED | OUTPATIENT
Start: 2020-08-19 | End: 2020-08-19 | Stop reason: HOSPADM

## 2020-08-19 RX ORDER — TOPIRAMATE 25 MG/1
100 TABLET, FILM COATED ORAL AT BEDTIME
Status: DISCONTINUED | OUTPATIENT
Start: 2020-08-19 | End: 2020-08-19

## 2020-08-19 RX ORDER — NALOXONE HYDROCHLORIDE 0.4 MG/ML
.1-.4 INJECTION, SOLUTION INTRAMUSCULAR; INTRAVENOUS; SUBCUTANEOUS
Status: DISCONTINUED | OUTPATIENT
Start: 2020-08-19 | End: 2020-08-19

## 2020-08-19 RX ORDER — METFORMIN HCL 500 MG
500 TABLET, EXTENDED RELEASE 24 HR ORAL
Status: DISCONTINUED | OUTPATIENT
Start: 2020-08-19 | End: 2020-08-19 | Stop reason: HOSPADM

## 2020-08-19 RX ORDER — DEXAMETHASONE SODIUM PHOSPHATE 4 MG/ML
4 INJECTION, SOLUTION INTRA-ARTICULAR; INTRALESIONAL; INTRAMUSCULAR; INTRAVENOUS; SOFT TISSUE
Status: DISCONTINUED | OUTPATIENT
Start: 2020-08-19 | End: 2020-08-19 | Stop reason: HOSPADM

## 2020-08-19 RX ORDER — LABETALOL 20 MG/4 ML (5 MG/ML) INTRAVENOUS SYRINGE
10
Status: DISCONTINUED | OUTPATIENT
Start: 2020-08-19 | End: 2020-08-19 | Stop reason: HOSPADM

## 2020-08-19 RX ORDER — LIDOCAINE 40 MG/G
CREAM TOPICAL
Status: DISCONTINUED | OUTPATIENT
Start: 2020-08-19 | End: 2020-08-19 | Stop reason: HOSPADM

## 2020-08-19 RX ORDER — HYDROMORPHONE HYDROCHLORIDE 1 MG/ML
.3-.5 INJECTION, SOLUTION INTRAMUSCULAR; INTRAVENOUS; SUBCUTANEOUS EVERY 5 MIN PRN
Status: DISCONTINUED | OUTPATIENT
Start: 2020-08-19 | End: 2020-08-19 | Stop reason: HOSPADM

## 2020-08-19 RX ORDER — BUSPIRONE HYDROCHLORIDE 15 MG/1
15 TABLET ORAL 2 TIMES DAILY
Status: ON HOLD
Start: 2020-08-19 | End: 2021-08-01

## 2020-08-19 RX ORDER — NALTREXONE HYDROCHLORIDE 50 MG/1
50 TABLET, FILM COATED ORAL EVERY EVENING
Status: ON HOLD
Start: 2020-08-19 | End: 2020-09-20

## 2020-08-19 RX ORDER — HEPARIN SODIUM,PORCINE 10 UNIT/ML
5-10 VIAL (ML) INTRAVENOUS EVERY 24 HOURS
Status: DISCONTINUED | OUTPATIENT
Start: 2020-08-19 | End: 2020-08-19 | Stop reason: HOSPADM

## 2020-08-19 RX ORDER — TOPIRAMATE 100 MG/1
50 TABLET, FILM COATED ORAL DAILY
Status: ON HOLD
Start: 2020-08-19 | End: 2020-09-20

## 2020-08-19 RX ORDER — SODIUM CHLORIDE, SODIUM LACTATE, POTASSIUM CHLORIDE, CALCIUM CHLORIDE 600; 310; 30; 20 MG/100ML; MG/100ML; MG/100ML; MG/100ML
INJECTION, SOLUTION INTRAVENOUS CONTINUOUS PRN
Status: DISCONTINUED | OUTPATIENT
Start: 2020-08-18 | End: 2020-08-19

## 2020-08-19 RX ORDER — MEPERIDINE HYDROCHLORIDE 25 MG/ML
12.5 INJECTION INTRAMUSCULAR; INTRAVENOUS; SUBCUTANEOUS EVERY 5 MIN PRN
Status: DISCONTINUED | OUTPATIENT
Start: 2020-08-19 | End: 2020-08-19 | Stop reason: HOSPADM

## 2020-08-19 RX ORDER — ONDANSETRON 2 MG/ML
INJECTION INTRAMUSCULAR; INTRAVENOUS PRN
Status: DISCONTINUED | OUTPATIENT
Start: 2020-08-19 | End: 2020-08-19

## 2020-08-19 RX ORDER — HYDROXYCHLOROQUINE SULFATE 200 MG/1
200 TABLET, FILM COATED ORAL 2 TIMES DAILY
Status: DISCONTINUED | OUTPATIENT
Start: 2020-08-19 | End: 2020-08-19 | Stop reason: HOSPADM

## 2020-08-19 RX ORDER — ONDANSETRON 4 MG/1
4 TABLET, ORALLY DISINTEGRATING ORAL EVERY 30 MIN PRN
Status: DISCONTINUED | OUTPATIENT
Start: 2020-08-19 | End: 2020-08-19 | Stop reason: HOSPADM

## 2020-08-19 RX ORDER — PREGABALIN 50 MG/1
50 CAPSULE ORAL 3 TIMES DAILY
Status: DISCONTINUED | OUTPATIENT
Start: 2020-08-19 | End: 2020-08-19 | Stop reason: HOSPADM

## 2020-08-19 RX ORDER — ONDANSETRON 2 MG/ML
4 INJECTION INTRAMUSCULAR; INTRAVENOUS EVERY 30 MIN PRN
Status: DISCONTINUED | OUTPATIENT
Start: 2020-08-19 | End: 2020-08-19 | Stop reason: HOSPADM

## 2020-08-19 RX ORDER — ONDANSETRON 4 MG/1
4 TABLET, ORALLY DISINTEGRATING ORAL EVERY 6 HOURS PRN
Status: DISCONTINUED | OUTPATIENT
Start: 2020-08-19 | End: 2020-08-19 | Stop reason: HOSPADM

## 2020-08-19 RX ORDER — HEPARIN SODIUM (PORCINE) LOCK FLUSH IV SOLN 100 UNIT/ML 100 UNIT/ML
5 SOLUTION INTRAVENOUS
Status: DISCONTINUED | OUTPATIENT
Start: 2020-08-19 | End: 2020-08-19 | Stop reason: HOSPADM

## 2020-08-19 RX ORDER — HYDROXYZINE HYDROCHLORIDE 25 MG/1
50 TABLET, FILM COATED ORAL 3 TIMES DAILY PRN
Status: DISCONTINUED | OUTPATIENT
Start: 2020-08-19 | End: 2020-08-19 | Stop reason: HOSPADM

## 2020-08-19 RX ORDER — FLUMAZENIL 0.1 MG/ML
0.2 INJECTION, SOLUTION INTRAVENOUS
Status: DISCONTINUED | OUTPATIENT
Start: 2020-08-19 | End: 2020-08-19 | Stop reason: HOSPADM

## 2020-08-19 RX ORDER — HEPARIN SODIUM,PORCINE 10 UNIT/ML
3 VIAL (ML) INTRAVENOUS
Status: DISCONTINUED | OUTPATIENT
Start: 2020-08-19 | End: 2020-08-19 | Stop reason: HOSPADM

## 2020-08-19 RX ORDER — DIMENHYDRINATE 50 MG/ML
25 INJECTION, SOLUTION INTRAMUSCULAR; INTRAVENOUS
Status: DISCONTINUED | OUTPATIENT
Start: 2020-08-19 | End: 2020-08-19 | Stop reason: HOSPADM

## 2020-08-19 RX ORDER — FENTANYL CITRATE 50 UG/ML
25-50 INJECTION, SOLUTION INTRAMUSCULAR; INTRAVENOUS
Status: DISCONTINUED | OUTPATIENT
Start: 2020-08-19 | End: 2020-08-19 | Stop reason: HOSPADM

## 2020-08-19 RX ORDER — SODIUM CHLORIDE, SODIUM LACTATE, POTASSIUM CHLORIDE, CALCIUM CHLORIDE 600; 310; 30; 20 MG/100ML; MG/100ML; MG/100ML; MG/100ML
INJECTION, SOLUTION INTRAVENOUS CONTINUOUS
Status: DISCONTINUED | OUTPATIENT
Start: 2020-08-19 | End: 2020-08-19 | Stop reason: HOSPADM

## 2020-08-19 RX ORDER — PROPOFOL 10 MG/ML
INJECTION, EMULSION INTRAVENOUS PRN
Status: DISCONTINUED | OUTPATIENT
Start: 2020-08-18 | End: 2020-08-19

## 2020-08-19 RX ORDER — ACETAMINOPHEN 500 MG
1000 TABLET ORAL EVERY 6 HOURS PRN
Status: DISCONTINUED | OUTPATIENT
Start: 2020-08-19 | End: 2020-08-19 | Stop reason: HOSPADM

## 2020-08-19 RX ORDER — DESVENLAFAXINE 100 MG/1
100 TABLET, EXTENDED RELEASE ORAL DAILY
Status: DISCONTINUED | OUTPATIENT
Start: 2020-08-19 | End: 2020-08-19 | Stop reason: HOSPADM

## 2020-08-19 RX ORDER — HYDRALAZINE HYDROCHLORIDE 20 MG/ML
2.5-5 INJECTION INTRAMUSCULAR; INTRAVENOUS EVERY 10 MIN PRN
Status: DISCONTINUED | OUTPATIENT
Start: 2020-08-19 | End: 2020-08-19 | Stop reason: HOSPADM

## 2020-08-19 RX ORDER — CLONIDINE HYDROCHLORIDE 0.1 MG/1
0.1 TABLET ORAL 2 TIMES DAILY
Status: DISCONTINUED | OUTPATIENT
Start: 2020-08-19 | End: 2020-08-19 | Stop reason: HOSPADM

## 2020-08-19 RX ADMIN — BUSPIRONE HYDROCHLORIDE 15 MG: 10 TABLET ORAL at 09:39

## 2020-08-19 RX ADMIN — HYDROXYCHLOROQUINE SULFATE 200 MG: 200 TABLET, FILM COATED ORAL at 09:39

## 2020-08-19 RX ADMIN — CLONIDINE HYDROCHLORIDE 0.1 MG: 0.1 TABLET ORAL at 09:39

## 2020-08-19 RX ADMIN — FENTANYL CITRATE 50 MCG: 50 INJECTION, SOLUTION INTRAMUSCULAR; INTRAVENOUS at 00:27

## 2020-08-19 RX ADMIN — HYDROMORPHONE HYDROCHLORIDE 0.5 MG: 1 INJECTION, SOLUTION INTRAMUSCULAR; INTRAVENOUS; SUBCUTANEOUS at 01:32

## 2020-08-19 RX ADMIN — PHENYLEPHRINE HYDROCHLORIDE 100 MCG: 10 INJECTION INTRAVENOUS at 00:02

## 2020-08-19 RX ADMIN — SODIUM CHLORIDE, POTASSIUM CHLORIDE, SODIUM LACTATE AND CALCIUM CHLORIDE: 600; 310; 30; 20 INJECTION, SOLUTION INTRAVENOUS at 00:45

## 2020-08-19 RX ADMIN — HYDROMORPHONE HYDROCHLORIDE 0.5 MG: 1 INJECTION, SOLUTION INTRAMUSCULAR; INTRAVENOUS; SUBCUTANEOUS at 01:01

## 2020-08-19 RX ADMIN — PREGABALIN 50 MG: 50 CAPSULE ORAL at 09:39

## 2020-08-19 RX ADMIN — HEPARIN 5 ML: 100 SYRINGE at 12:05

## 2020-08-19 RX ADMIN — ONDANSETRON 4 MG: 2 INJECTION INTRAMUSCULAR; INTRAVENOUS at 00:15

## 2020-08-19 RX ADMIN — FENTANYL CITRATE 50 MCG: 50 INJECTION, SOLUTION INTRAMUSCULAR; INTRAVENOUS at 00:31

## 2020-08-19 RX ADMIN — Medication: at 04:33

## 2020-08-19 RX ADMIN — DEXAMETHASONE SODIUM PHOSPHATE 8 MG: 4 INJECTION, SOLUTION INTRA-ARTICULAR; INTRALESIONAL; INTRAMUSCULAR; INTRAVENOUS; SOFT TISSUE at 00:00

## 2020-08-19 ASSESSMENT — ACTIVITIES OF DAILY LIVING (ADL)
COGNITION: 0 - NO COGNITION ISSUES REPORTED
SWALLOWING: 0-->SWALLOWS FOODS/LIQUIDS WITHOUT DIFFICULTY
BATHING: 0-->INDEPENDENT
TRANSFERRING: 0-->INDEPENDENT
RETIRED_COMMUNICATION: 0-->UNDERSTANDS/COMMUNICATES WITHOUT DIFFICULTY
RETIRED_EATING: 0-->INDEPENDENT
TOILETING: 0-->INDEPENDENT
AMBULATION: 0-->INDEPENDENT
FALL_HISTORY_WITHIN_LAST_SIX_MONTHS: NO
DRESS: 0-->INDEPENDENT

## 2020-08-19 ASSESSMENT — PAIN DESCRIPTION - DESCRIPTORS
DESCRIPTORS: ACHING
DESCRIPTORS: SHARP
DESCRIPTORS: ACHING

## 2020-08-19 NOTE — PLAN OF CARE
Admitted/transferred from: PACU  2 RN skin assessment completed by Sammi Castillo, CARMEN and Ankit Reich .  Skin assessment finding: blister on left foot as stated by pt, (not seen underneath band aid, pt refused)  Interventions/actions: pt refused to replace band aid.   Will continue to monitor.

## 2020-08-19 NOTE — PROCEDURES
[facility]astro[facility]ology Endoscopy [facility] Brief Operative Note    Procedure:  Upper endoscopy   Post-operative diagnosis:  Foreign body removal   Staff Physician:  Dr. Pamela Perez   Fellow/Assistant(s):  Dr. Berenice Schmitz    Specimens:  Please see final procedure note for further details.   Findings:  Stomach with mod amount of food. Straightened piece of metal found amongst food. Removed with snare and foreign body simons.    Complications:  None.   Condition:  Stable   Recommendations  Diet:  Return to previous diet  PPI:  N/A  Anti-coagulants/platelets:  N/A  Octreotide:  N/A  Discharge Planning:   [facility].

## 2020-08-19 NOTE — OR NURSING
Pt to the Pacu from OR. Pt awake, oriented x4 and follows commands. Pt denies feeling groggy. Vitals WNL's. Pt stable. Reports sharp left lower anterior chest pain.

## 2020-08-19 NOTE — PLAN OF CARE
Patient came in to unit around 0600 form PACU s/p EGD for foreign body removal. A/Ox4, VSS on RA. +BS, soft non distended abdomen, denies pain and nausea. Voided upon arrival to unit, 175 ml clear UOP. No skin issues, except a blister on left foot, not seen underneath band aid, pt refused. Right chest port with LR at 100 ml/hr. Placed on 1:1 for safety due to patient's history. Continue poc.   Vitals:    08/19/20 0515 08/19/20 0530 08/19/20 0545 08/19/20 0616   BP: 122/73 128/80 111/67 130/82   BP Location:  Right arm  Left arm   Pulse:  82  92   Resp: 16 18 18 18   Temp:  98.2  F (36.8  C)  98.5  F (36.9  C)   TempSrc:  Oral  Oral   SpO2: 96% 96% 96% 96%

## 2020-08-19 NOTE — PROGRESS NOTES
Social Work Services Progress Note    Hospital Day: 2  Date of Initial Social Work Evaluation:  NA  Collaborated with:  [facility]  with [employer]    Data:  Writer received call from Solange asking for an update re: pt's plan of care.    Intervention:  Referred Solange to contact YORDAN Michel, 7B ALLAN and gave her Marilu's phone number.    Assessment:  Care coordination.    Plan:    Anticipated Disposition:  To be determined by care team and patient    Barriers to d/c plan:  NA    Follow Up:  Unit SW to follow.    DENIS Warner  Social Work Services  Emergency Department   [phone] phone  [phone] pager  8:00am-8:30pm Mon-Sat    On-call pager, [phone], 4:00pm to midnight

## 2020-08-19 NOTE — OR NURSING
Report given to 7B RN. Pt has been in Pacu on hold awaiting 7B to get a private room cleaned. Pt has been sleeping, awakens easily and oriented x4. Vitals are WNL's and Pt is stable. Staff have been in Pt's room monitoring her.

## 2020-08-19 NOTE — ANESTHESIA PREPROCEDURE EVALUATION
Anesthesia Pre-Procedure Evaluation    Patient: Nevin Alvarado   MRN:     [id number] Gender:   female   Age:    29 year old :      1990        Preoperative Diagnosis: Foreign body, swallowed [T18.9XXA]   Procedure(s):  ESOPHAGOGASTRODUODENOSCOPY (EGD)     LABS:  CBC:   Lab Results   Component Value Date    WBC 10.7 2020    WBC 10.5 2020    HGB 11.9 2020    HGB 12.3 2020    HCT 37.1 2020    HCT 38.8 2020     2020     2020     BMP:   Lab Results   Component Value Date     2020     2020    POTASSIUM 3.5 2020    POTASSIUM 3.4 2020    CHLORIDE 111 (H) 2020    CHLORIDE 109 2020    CO2 23 2020    CO2 24 2020    BUN 8 2020    BUN 9 2020    CR 0.76 2020    CR 0.62 2020    GLC 98 2020    GLC 94 2020     COAGS:   Lab Results   Component Value Date    PTT 46 (H) 2020    INR 1.11 2020     POC:   Lab Results   Component Value Date     (H) 2020    HCG Negative 2020    HCGS Negative 2020     OTHER:   Lab Results   Component Value Date    LACT 1.0 2019    KELBY 8.7 2020    PHOS 3.5 2019    MAG 1.9 2019    ALBUMIN 3.5 2020    PROTTOTAL 8.8 2020    ALT 31 2020    AST 20 2020    ALKPHOS 86 2020    BILITOTAL 0.3 2020    LIPASE 146 02/15/2020    TSH 4.56 (H) 02/15/2020    T4 0.78 02/15/2020    CRP 6.6 2019    SED 26 (H) 2017        Preop Vitals    BP Readings from Last 3 Encounters:   20 (!) 136/91   20 121/83   20 125/81    Pulse Readings from Last 3 Encounters:   20 85   20 68   20 74      Resp Readings from Last 3 Encounters:   20 18   20 18   20 16    SpO2 Readings from Last 3 Encounters:   20 97%   20 98%   20 97%      Temp Readings from Last 1 Encounters:   20 36.3  C (97.4  F)  "(Oral)    Ht Readings from Last 1 Encounters:   08/01/20 1.575 m (5' 2\")      Wt Readings from Last 1 Encounters:   08/01/20 115.7 kg (255 lb)    Estimated body mass index is 46.64 kg/m  as calculated from the following:    Height as of 8/1/20: 1.575 m (5' 2\").    Weight as of 8/1/20: 115.7 kg (255 lb).     LDA:  Port A Cath Single 07/11/20 Right Chest wall (Active)   Number of days: 38        Past Medical History:   Diagnosis Date     ADD (attention deficit disorder)      Anorexia nervosa with bulimia     history of; on Topamax     Anxiety      Borderline personality disorder (H)      Depression      H/O self-harm      History of pulmonary embolism 12/2019    Provoked. Completed 3 month course of Apixaban     Morbid obesity (H)      Neuropathy      PTSD (post-traumatic stress disorder)      Rectal foreign body - Recurrent issue, self placed      Swallowed foreign body - Recurrent issue, self placed       Past Surgical History:   Procedure Laterality Date     COMBINED ESOPHAGOSCOPY, GASTROSCOPY, DUODENOSCOPY (EGD), REPLACE ESOPHAGEAL STENT N/A 10/9/2019    Procedure: Upper Endoscopy with Suture Placement;  Surgeon: Zurdo Ramirez MD;  [facility]: [facility]     ESOPHAGOSCOPY, GASTROSCOPY, DUODENOSCOPY (EGD), COMBINED N/A 3/9/2017    Procedure: COMBINED ESOPHAGOSCOPY, GASTROSCOPY, DUODENOSCOPY (EGD), REMOVE FOREIGN BODY;  Surgeon: Avis Guzmán MD;  Location: [facility]     ESOPHAGOSCOPY, GASTROSCOPY, DUODENOSCOPY (EGD), COMBINED N/A 4/20/2017    Procedure: COMBINED ESOPHAGOSCOPY, GASTROSCOPY, DUODENOSCOPY (EGD), REMOVE FOREIGN BODY;  EGD removal Foregin body;  Surgeon: Lokesh Paula MD;  Location: [facility]     ESOPHAGOSCOPY, GASTROSCOPY, DUODENOSCOPY (EGD), COMBINED N/A 6/12/2017    Procedure: COMBINED ESOPHAGOSCOPY, GASTROSCOPY, DUODENOSCOPY (EGD);  COMBINED ESOPHAGOSCOPY, GASTROSCOPY, DUODENOSCOPY (EGD) [[id number]]attempted removal of foreign body;  Surgeon: Pamela Perez MD;  " Location: [facility]     ESOPHAGOSCOPY, GASTROSCOPY, DUODENOSCOPY (EGD), COMBINED N/A 6/9/2017    Procedure: COMBINED ESOPHAGOSCOPY, GASTROSCOPY, DUODENOSCOPY (EGD), REMOVE FOREIGN BODY;  Esophagoscopy, Gastroscopy, Duodenoscopy, Removal of Foreign Body;  Surgeon: Dejon Marsh MD;  Location: [facility]     ESOPHAGOSCOPY, GASTROSCOPY, DUODENOSCOPY (EGD), COMBINED N/A 1/6/2018    Procedure: COMBINED ESOPHAGOSCOPY, GASTROSCOPY, DUODENOSCOPY (EGD), REMOVE FOREIGN BODY;  COMBINED ESOPHAGOSCOPY, GASTROSCOPY, DUODENOSCOPY (EGD) [by pascal net and snare with profol sedation;  Surgeon: Feliciano Emmanuel MD;  Location:  [facility]     ESOPHAGOSCOPY, GASTROSCOPY, DUODENOSCOPY (EGD), COMBINED N/A 3/19/2018    Procedure: COMBINED ESOPHAGOSCOPY, GASTROSCOPY, DUODENOSCOPY (EGD), REMOVE FOREIGN BODY;   Esophagodscopy, Gastroscopy, Duodenoscopy,Foreign Body Removal;  Surgeon: Brice Guzmán MD;  [facility]     ESOPHAGOSCOPY, GASTROSCOPY, DUODENOSCOPY (EGD), COMBINED N/A 4/16/2018    Procedure: COMBINED ESOPHAGOSCOPY, GASTROSCOPY, DUODENOSCOPY (EGD), REMOVE FOREIGN BODY;  Esophagogastroduodenoscopy  Foreign Body Removal X 2;  Surgeon: Royer Moise MD;  [facility]     ESOPHAGOSCOPY, GASTROSCOPY, DUODENOSCOPY (EGD), COMBINED N/A 6/1/2018    Procedure: COMBINED ESOPHAGOSCOPY, GASTROSCOPY, DUODENOSCOPY (EGD), REMOVE FOREIGN BODY;  COMBINED ESOPHAGOSCOPY, GASTROSCOPY, DUODENOSCOPY with removal of foreign body, propofol sedation by anesthesia;  Surgeon: Brice Martinez MD;  Location:  GI     ESOPHAGOSCOPY, GASTROSCOPY, DUODENOSCOPY (EGD), COMBINED N/A 7/25/2018    Procedure: COMBINED ESOPHAGOSCOPY, GASTROSCOPY, DUODENOSCOPY (EGD), REMOVE FOREIGN BODY;;  Surgeon: Candy Castelan MD;  Location:  GI     ESOPHAGOSCOPY, GASTROSCOPY, DUODENOSCOPY (EGD), COMBINED N/A 7/28/2018    Procedure: COMBINED ESOPHAGOSCOPY, GASTROSCOPY, DUODENOSCOPY (EGD), REMOVE FOREIGN BODY;  COMBINED ESOPHAGOSCOPY, GASTROSCOPY,  DUODENOSCOPY (EGD), REMOVE FOREIGN BODY;  Surgeon: Brice Guzmán MD;  [facility]     ESOPHAGOSCOPY, GASTROSCOPY, DUODENOSCOPY (EGD), COMBINED N/A 7/31/2018    Procedure: COMBINED ESOPHAGOSCOPY, GASTROSCOPY, DUODENOSCOPY (EGD);  COMBINED ESOPHAGOSCOPY, GASTROSCOPY, DUODENOSCOPY (EGD) TO REMOVE FOREIGN BODY;  Surgeon: Lokesh Paula MD;  [facility]     ESOPHAGOSCOPY, GASTROSCOPY, DUODENOSCOPY (EGD), COMBINED N/A 8/4/2018    Procedure: COMBINED ESOPHAGOSCOPY, GASTROSCOPY, DUODENOSCOPY (EGD), REMOVE FOREIGN BODY;   combined esophagoscopy, gastroscopy, duodenoscopy, REMOVE FOREIGN BODY ;  Surgeon: Lokesh Paula MD;  [facility]     ESOPHAGOSCOPY, GASTROSCOPY, DUODENOSCOPY (EGD), COMBINED N/A 10/6/2019    Procedure: ESOPHAGOGASTRODUODENOSCOPY (EGD) with fireign body removal x2, esophageal stent placement with floroscopy;  Surgeon: Timoteo Espana MD;  [facility]     ESOPHAGOSCOPY, GASTROSCOPY, DUODENOSCOPY (EGD), COMBINED N/A 12/2/2019    Procedure: Esophagogastroduodenoscopy with esophageal stent removal, esophogram;  Surgeon: Kailee Tena MD;  [facility]     ESOPHAGOSCOPY, GASTROSCOPY, DUODENOSCOPY (EGD), COMBINED N/A 12/17/2019    Procedure: ESOPHAGOGASTRODUODENOSCOPY, WITH FOREIGN BODY REMOVAL;  Surgeon: Pamela Perez MD;  [facility]     ESOPHAGOSCOPY, GASTROSCOPY, DUODENOSCOPY (EGD), COMBINED N/A 12/13/2019    Procedure: ESOPHAGOGASTRODUODENOSCOPY, WITH FOREIGN BODY REMOVAL;  Surgeon: Samia Stanton MD;  [facility]     ESOPHAGOSCOPY, GASTROSCOPY, DUODENOSCOPY (EGD), COMBINED N/A 12/28/2019    Procedure: ESOPHAGOGASTRODUODENOSCOPY (EGD) Removal of Foreign Body X 2;  Surgeon: Huy Kelley MD;  [facility]     ESOPHAGOSCOPY, GASTROSCOPY, DUODENOSCOPY (EGD), COMBINED N/A 1/5/2020    Procedure: ESOPHAGOGASTRODUOENOSCOPY WITH FOREIGN BODY REMOVAL;  Surgeon: Pamela Perez MD;  [facility]     ESOPHAGOSCOPY,  GASTROSCOPY, DUODENOSCOPY (EGD), COMBINED N/A 1/3/2020    Procedure: ESOPHAGOGASTRODUODENOSCOPY (EGD) REMOVAL OF FOREIGN BODY.;  Surgeon: Pamela Perez MD;  [facility]     ESOPHAGOSCOPY, GASTROSCOPY, DUODENOSCOPY (EGD), COMBINED N/A 1/13/2020    Procedure: ESOPHAGOGASTRODUODENOSCOPY (EGD) for foreign body removal;  Surgeon: Lokesh Paula MD;  [facility]     ESOPHAGOSCOPY, GASTROSCOPY, DUODENOSCOPY (EGD), COMBINED N/A 1/18/2020    Procedure: Diagnostic ESOPHAGOGASTRODUODENOSCOPY (EGD;  Surgeon: Lokesh Paula MD;  [facility]     ESOPHAGOSCOPY, GASTROSCOPY, DUODENOSCOPY (EGD), COMBINED N/A 3/29/2020    Procedure: UPPER ENDOSCOPY WITH FOREIGN BODY REMOVAL;  Surgeon: Doug Hansen MD;  [facility]     ESOPHAGOSCOPY, GASTROSCOPY, DUODENOSCOPY (EGD), COMBINED N/A 7/11/2020    Procedure: ESOPHAGOGASTRODUODENOSCOPY (EGD); Upper Endoscopy WITH FOREIGN BODY REMOVAL;  Surgeon: Lyndsey Mendoza DO;  [facility]     ESOPHAGOSCOPY, GASTROSCOPY, DUODENOSCOPY (EGD), COMBINED N/A 7/29/2020    Procedure: ESOPHAGOGASTRODUODENOSCOPY REMOVAL OF FOREIGN BODY;  Surgeon: Samia Stanton MD;  [facility]     ESOPHAGOSCOPY, GASTROSCOPY, DUODENOSCOPY (EGD), COMBINED N/A 8/1/2020    Procedure: ESOPHAGOGASTRODUODENOSCOPY, WITH FOREIGN BODY REMOVAL;  Surgeon: Pamela Perez MD;  [facility]     EXAM UNDER ANESTHESIA ANUS N/A 1/10/2017    Procedure: EXAM UNDER ANESTHESIA ANUS;  Surgeon: Annmarie Haynes MD;  [facility]     EXAM UNDER ANESTHESIA RECTUM N/A 7/19/2018    Procedure: EXAM UNDER ANESTHESIA RECTUM;  EXAM UNDER ANESTHESIA, REMOVAL OF RECTAL FOREIGN BODY;  Surgeon: Annmarie Haynes MD;  [facility]     HC REMOVE FECAL IMPACTION OR FB W ANESTHESIA N/A 12/18/2016    Procedure: REMOVE FECAL IMPACTION/FOREIGN BODY UNDER ANESTHESIA;  Surgeon: Soham Cano MD;  Location: [facility]     KNEE SURGERY - removed a small tissue mass from  knee Right      LAPAROSCOPIC ABLATION ENDOMETRIOSIS       LAPAROTOMY EXPLORATORY N/A 1/10/2017    Procedure: LAPAROTOMY EXPLORATORY;  Surgeon: Annmarie Haynes MD;  [facility]     LAPAROTOMY EXPLORATORY  09/11/2019    Manual manipulation of bowel to remove pill bottle in rectum     lymph nodes removed from neck; benign  age 7     MAMMOPLASTY REDUCTION Bilateral      RELEASE CARPAL TUNNEL Bilateral      SIGMOIDOSCOPY FLEXIBLE N/A 1/10/2017    Procedure: SIGMOIDOSCOPY FLEXIBLE;  Surgeon: Annmarie Haynse MD;  [facility]     SIGMOIDOSCOPY FLEXIBLE N/A 5/8/2018    Procedure: SIGMOIDOSCOPY FLEXIBLE;  flex sig with foreign body removal using snare and rattooth forcep;  Surgeon: Soham Cano MD;  Location[facility]     SIGMOIDOSCOPY FLEXIBLE N/A 2/20/2019    Procedure: Exam under anesthesia Colonoscopy with attempted  removal of rectal foreign body;  Surgeon: Estrada Chávez MD;  Location[facility]      Allergies   Allergen Reactions     Amoxicillin-Pot Clavulanate Other (See Comments), Swelling and Rash     PN: facial swelling, left side. Also had cortisone injection the same day as she started the Augmentin.  Noted in downtime recovery of chart.       Penicillins Anaphylaxis     Vancomycin Itching, Swelling and Rash     Other reaction(s): Redness  Flushed face, very itchy; HUT Comment: Flushed face, very itchy; HUT Reaction: Angioedema; HUT Reaction: Redness; HUT Severity: Med; HUT Noted: [id number]       Hydrocodone Nausea and Vomiting and GI Disturbance     vomiting for days, PN: vomiting for days; HUT Comment: vomiting for days; HUT Reaction: Gastrointestinal; HUT Reaction: Nausea And Vomiting; HUT Reaction Type: Intolerance; HUT Severity: Med; HUT Noted: [id number]  vomiting for days       Blood-Group Specific Substance Other (See Comments)     Patient has an anti-Cw and non-specific antibodies. Blood product orders may be delayed. Draw one red top and two purple top tubes for all  type/screen/crossmatch orders.     Influenza Vaccines Other (See Comments)     Oseltamivir Hives     med stopped, PN: med stopped     Cephalosporins Rash     Lamotrigine Rash     Possibly associated with Lamictal.   HUT Comment: Possibly associated with Lamictal. ; HUT Reaction: Rash; HUT Reaction Type: Allergy; HUT Severity: Low; HUT Noted: [id number]     Latex Rash        Anesthesia Evaluation     . Pt has had prior anesthetic. Type: General    No history of anesthetic complications          ROS/MED HX    ENT/Pulmonary:       Neurologic:       Cardiovascular:         METS/Exercise Tolerance:     Hematologic:     (+) History of blood clots -      Musculoskeletal:         GI/Hepatic: Comment: Foreign body ingestion    (+) GERD       Renal/Genitourinary:      (-) renal disease   Endo:     (+) Obesity, .      Psychiatric:     (+) psychiatric history anxiety, depression, other (comment) and bipolar      Infectious Disease:         Malignancy:         Other:                         PHYSICAL EXAM:   Mental Status/Neuro: A/A/O   Airway: Facies: Feasible  Mallampati: I  Mouth/Opening: Full  TM distance: < 6 cm  Neck ROM: Full   Respiratory: Auscultation: CTAB     Resp. Rate: Normal     Resp. Effort: Normal      CV: Rhythm: Regular  Rate: Age appropriate  Heart: Normal Sounds  Edema: None   Comments:      Dental: Normal Dentition                Assessment:   ASA SCORE: 3 emergent   H&P: History and physical reviewed and following examination; no interval change.   Smoking Status:  Non-Smoker/Unknown   NPO Status: ELEVATED Aspiration Risk/Unknown     Plan:   Anes. Type:  General   Pre-Medication: None   Induction:  IV (RSI)   Airway: ETT; Oral   Access/Monitoring: PIV   Maintenance: Balanced     Postop Plan:   Postop Pain: Opioids  Postop Sedation/Airway: Not planned  Disposition: Outpatient     PONV Management:   Adult Risk Factors: Female, Non-Smoker, Postop Opioids   Prevention: Ondansetron, Dexamethasone     CONSENT:  Direct conversation   Plan and risks discussed with: Patient   Blood Products: Consent Deferred (Minimal Blood Loss)       Comments for Plan/Consent:  Patient here for emergency  Upper endoscopy and foreign body retrieve.   Anesthesia considerations and plan as stated above.   COVID test collected but still in process.                  Ekaterina Madera MD

## 2020-08-19 NOTE — PROGRESS NOTES
"Social Work Services Progress Note    Hospital Day: 2  Date of Initial Social Work Evaluation:  Not officially completed  Collaborated with:  Chart review, team rounds, medical team, pt    Data:  \"Ms. Nevin Alvarado is a 29-year-old female who was admitted on 8/19/20 for EGD retrieval after swallowing a spiral notebook coil secondary to anxiety. PMH significant for borderline personality disorder, PTSD, unspecified anxiety disorder, hx of PE, and frequent admissions for foreign body ingestion\".     Per pt's medical team pt is safe to discharge today. SW requested Psych consult given pt's admission diagnosis and pt being with a sitter.     Received vm from pt's [facility] , Solange (p. [phone]) requesting an update.     Intervention:  ALLAN met with pt in pt's room to introduce self, check in, and seek permission to update Solange.   Pt was sitting in bed dressed and ready to leave. Pt reported she is doing ok today.     SW explained call from [facility] and that she is requesting an update and asked for pt permission to update [facility]ange. Pt reported she would rather update Solange herself and reported frustration with hospitalizations and the fact that hospital staff always end up updating Solange before pt can. Pt said she will update Solange today and said she has an outpatient therapy appt today at 2pm.   SW assessed further resource needs and pt declined this and said she feels she has everything she needs and noted medication changes as a recent stressor for her and feeling like her body is playing catch up with the changes.     SW explained that Solange will not be contacted by ALLAN and pt said that if [facility] calls again to tell her that pt will call and update her.     Pt did not have any further SW needs or questions at this time and is set to discharge today. Psychiatry has cleared pt to discharge today- see their documentation for details.     Assessment:  See bedside RN, medical team, Psychiatry " notes    Plan:    Anticipated Disposition:  Home with services    Barriers to d/c plan:  NA    Follow Up:  TBD, see discharge orders    MYLES Palm, [address]   [phone] (pager) [phone]  8/19/2020

## 2020-08-19 NOTE — PROGRESS NOTES
Patient was given discharge education. Questions were encouraged and answered. Patient's chest port was de-accessed prior to discharge. No medication need to be picked up at discharge pharmacy. Patient discharged at 12:30 am.

## 2020-08-19 NOTE — DISCHARGE SUMMARY
[facility], [place]  Discharge Summary - Medicine & Pediatrics       Date of Admission:  8/18/2020  Date of Discharge:  8/19/2020  Discharging Provider: Dr. Oliver  Discharge Service: Brittney Jones    Discharge Diagnoses   Ingested spiral notebook coil s/p EGD with retrieval  CANDICE  PTSD  BPD  ADD     Follow-ups Needed After Discharge   Follow-up Appointments     Adult [facility] Follow-up and recommended labs and tests      Follow up with psychiatrist and therapy within 3-5 days.  Follow up with primary care provider, Latonya Knight, within 7 days for   hospital follow- up.  No follow up labs or test are needed.      Appointments on [place] and/or [facility] (with [facility] or [facility]   provider or service). Call [phone] if you haven't heard regarding   these appointments within 7 days of discharge.             Discharge Disposition   Discharged to home  Condition at discharge: Stable    Hospital Course   Nevin Alvarado is a 29 year old female with hx of BPD, ADD, anorexia, PTSD and frequent admissions for foreign body ingestion who is admitted after swallowing a spiral notebook coil now s/p EGD with retrieval. The following problems were addressed during her hospitalization:    Ingested spiral notebook coil s/p EGD with retrieval  H/o CANDICE, PTSD, BPD, ADD   History of multiple prior admissions for foreign body ingestion. Patient ingested coil out of anxiety due to recent changes in her psych meds including stopping Topamax, increasing buspirone, and starting naltrexone. S/p EGD, patient tolerated regular diet. Patient was evaluated by Psych and safe to discharge. She denied SI and no further urges to harm herself or swallow anything. Continue PTA buspirone, desvenlafaxine, hydroxyzine, lurasidone, pregabalin. She had outpatient therapist and DBT group therapy on day of discharge.    Consultations This Hospital Stay   PSYCHIATRY IP CONSULT    Code Status   Prior       The patient was  discussed with Dr. Melody Clancy S Her  [place] [facility] Service  [facility], [place]  Pager: [phone]  ______________________________________________________________________    Physical Exam   Vital Signs: Temp: 98  F (36.7  C) Temp src: Oral BP: 127/72 Pulse: 82 RETIRE: Heart Rate: 89 Resp: 16 SpO2: 98 % O2 Device: None (Room air) Oxygen Delivery: 2 LPM  Weight: 0 lbs 0 oz  Awake and alert, nad, appears anxious  RRR, no murmur  CTAB  Obese, soft, nt/nd  No LE edema      Primary Care Physician   Latonya Knight    Discharge Orders      Reason for your hospital stay    You were in the hospital for foreign body ingestion, removed with endoscopy.     Adult [facility] Follow-up and recommended labs and tests    Follow up with psychiatrist and therapy within 3-5 days.  Follow up with primary care provider, Latonya Knight, within 7 days for hospital follow- up.  No follow up labs or test are needed.      Appointments on [place] and/or [facility] (with [facility] or [facility] provider or service). Call [phone] if you haven't heard regarding these appointments within 7 days of discharge.     Activity    Your activity upon discharge: activity as tolerated     When to contact your care team    Call [phone] if you have any of the following: temperature greater than 100.4F or less than 95F,  increased shortness of breath, increased swelling or increased pain.     Discharge Instructions     Diet    Follow this diet upon discharge: Orders Placed This Encounter      Regular Diet Adult       Significant Results and Procedures   Most Recent 3 CBC's:  Recent Labs   Lab Test 07/28/20 2054 07/11/20 2006 03/29/20  1737   WBC 10.7 10.5 9.7   HGB 11.9 12.3 11.6*   MCV 87 87 89    255 310     Most Recent 3 BMP's:  Recent Labs   Lab Test 08/18/20 2254 07/28/20 2054 07/11/20 2006    140 138   POTASSIUM 3.5 3.5 3.4   CHLORIDE 109 111* 109   CO2 25 23 24   BUN 11 8 9   CR 0.61 0.76 0.62   ANIONGAP 6 6 5    KELBY 8.8 8.7 8.9   * 98 94     Most Recent 2 LFT's:  Recent Labs   Lab Test 07/11/20  2006 02/15/20  2324   AST 20 21   ALT 31 20   ALKPHOS 86 95   BILITOTAL 0.3 0.2     Most Recent 3 INR's:  Recent Labs   Lab Test 07/28/20 2054 07/11/20 2006 01/17/20  2045   INR 1.11 1.05 1.16*   ,   Results for orders placed or performed during the hospital encounter of 08/18/20   XR Abdomen Port 1 View    Narrative    XR ABDOMEN PORT 1 VW on 8/18/2020 9:27 PM.    INDICATION: swallowed notebook spiral.    COMPARISON: Radiograph dated 8/1/2020    FINDINGS:   Portable AP upright radiograph of the abdomen. Linear radiopaque  foreign object projecting over the expected location of the stomach,  not significantly changed from 8/1/2020. Partially visualized right  chest Port-A-Cath with tip projecting over the high right atrium. No  abnormally dilated loops of bowel in the upper abdomen. No portal  venous gas. S shaped curvature of the thoracolumbar spine. The lung  bases are relatively clear.      Impression    IMPRESSION:   Linear radiopaque foreign object projecting over the expected location  of the stomach, not significantly changed from 8/1/2020.    I have personally reviewed the examination and initial interpretation  and I agree with the findings.    CHINO PERAZA MD       Discharge Medications   Discharge Medication List as of 8/19/2020 10:40 AM      START taking these medications    Details   naltrexone (DEPADE/REVIA) 50 MG tablet Take 1 tablet (50 mg) by mouth every evening, No Print Out         CONTINUE these medications which have CHANGED    Details   busPIRone (BUSPAR) 15 MG tablet Take 1 tablet (15 mg) by mouth 2 times daily, No Print Out      topiramate (TOPAMAX) 100 MG tablet Take 0.5 tablets (50 mg) by mouth daily Take 1 tablet (100 mg) by mouth daily at bedtime, No Print Out         CONTINUE these medications which have NOT CHANGED    Details   acetaminophen (TYLENOL) 32 mg/mL liquid Take 31.25 mLs  (1,000 mg) by mouth every 6 hours as needed for fever or pain, Disp-355 mL, R-0, E-Prescribe      albuterol (PROAIR HFA/PROVENTIL HFA/VENTOLIN HFA) 108 (90 Base) MCG/ACT inhaler Inhale 2 puffs into the lungs as needed for shortness of breath / dyspnea or wheezing, HistoricalPharmacy may dispense brand covered by insurance (Proair, or proventil or ventolin or generic albuterol inhaler)      Cholecalciferol (VITAMIN D) 50 MCG (2000 UT) CAPS Take 2,000 Units by mouth daily , Historical      cloNIDine (CATAPRES) 0.1 MG tablet Take 0.1 mg by mouth 2 times daily , Historical      desvenlafaxine (PRISTIQ) 100 MG 24 hr tablet Take 1 tablet (100 mg) by mouth every morning, Historical      docosanol (ABREVA) 10 % CREA cream Apply to lip 5 times a day as soon as symptoms begin, do not use for more than 10 days. Used PRN.Historical      hydroxychloroquine (PLAQUENIL) 200 MG tablet Take 200 mg by mouth 2 times daily, Historical      hydrOXYzine (ATARAX) 50 MG tablet Take 1 tablet (50 mg) by mouth 3 times daily as needed for anxiety, Disp-30 tablet, R-0, E-Prescribe      lurasidone (LATUDA) 60 MG TABS tablet Take 1 tablet (60 mg) by mouth at bedtime, Historical      metFORMIN (GLUCOPHAGE-XR) 500 MG 24 hr tablet Take 1,000 mg by mouth Take 1 tablet (500 mg) by mouth daily , Historical      norethindrone (MICRONOR) 0.35 MG tablet Take 0.35 mg by mouth daily, Historical      ondansetron (ZOFRAN-ODT) 4 MG ODT tab Take 4 mg by mouth every 6 hours as needed for nausea or vomiting , Historical      pantoprazole (PROTONIX) 40 MG EC tablet Take 1 tablet (40 mg) by mouth daily, Disp-30 tablet,R-1, E-PrescribeMay stop after 8 weeks      pregabalin (LYRICA) 50 MG capsule Take 50 mg by mouth 3 times daily, Historical      Prenatal Vit-Fe Fumarate-FA (PNV PRENATAL PLUS MULTIVITAMIN) 27-1 MG TABS per tablet Take 1 tablet by mouth daily, Historical           Allergies   Allergies   Allergen Reactions     Amoxicillin-Pot Clavulanate Other (See  Comments), Swelling and Rash     PN: facial swelling, left side. Also had cortisone injection the same day as she started the Augmentin.  Noted in downtime recovery of chart.       Penicillins Anaphylaxis     Vancomycin Itching, Swelling and Rash     Other reaction(s): Redness  Flushed face, very itchy; HUT Comment: Flushed face, very itchy; HUT Reaction: Angioedema; HUT Reaction: Redness; HUT Severity: Med; HUT Noted: [id number]       Hydrocodone Nausea and Vomiting and GI Disturbance     vomiting for days, PN: vomiting for days; HUT Comment: vomiting for days; HUT Reaction: Gastrointestinal; HUT Reaction: Nausea And Vomiting; HUT Reaction Type: Intolerance; HUT Severity: Med; HUT Noted: [id number]  vomiting for days       Blood-Group Specific Substance Other (See Comments)     Patient has an anti-Cw and non-specific antibodies. Blood product orders may be delayed. Draw one red top and two purple top tubes for all type/screen/crossmatch orders.     Influenza Vaccines Other (See Comments)     Oseltamivir Hives     med stopped, PN: med stopped     Cephalosporins Rash     Lamotrigine Rash     Possibly associated with Lamictal.   HUT Comment: Possibly associated with Lamictal. ; HUT Reaction: Rash; HUT Reaction Type: Allergy; HUT Severity: Low; HUT Noted: [id number]     Latex Rash     Internal Medicine Staff Attestation  Date of Service: 8/19/2020  I have seen and examined Nevin ZAN Jenny, reviewed the data and discussed the plan of care with the care team on rounds.  I agree with the above documentation with the additions/changes to the ROS, HPI, Exam or data (including my edits in italics):    I discussed pt's care with bedside RN, case management/social work today.  I personally reviewed, labs, medications and past 24 hr notes.    Assessment/Plan/Diagnoses: plan/dx as below, which contains my edits and reflects our joint medical decision-making.   >50% of 20 minutes spent in direct patient care and coordinating  discharge    Fausto Oliver MD PhD  Internal Medicine Hospitalist & Staff Physician   of Internal Medicine   [facility]  Pager: [phone]

## 2020-08-19 NOTE — ANESTHESIA CARE TRANSFER NOTE
Patient: Nevin Alvarado    Procedure(s):  ESOPHAGOGASTRODUODENOSCOPY (EGD) for foreign body removal    Diagnosis: Foreign body, swallowed [T18.9XXA]  Diagnosis Additional Information: No value filed.    Anesthesia Type:   General     Note:  Airway :Face Mask  Patient transferred to:PACU  Comments: VSS. Breathing spontaneously at a regular rate with adequate tidal volumes and maintaining O2 sats on 6L facemask. Denies nausea or pain. No apparent complications from anesthesia.     Ekaterina Ryan MD  CA3 Anesthesia Resident  Handoff Report: Identifed the Patient, Identified the Reponsible Provider, Reviewed the pertinent medical history, Discussed the surgical course, Reviewed Intra-OP anesthesia mangement and issues during anesthesia, Set expectations for post-procedure period and Allowed opportunity for questions and acknowledgement of understanding      Vitals: (Last set prior to Anesthesia Care Transfer)    CRNA VITALS  8/19/2020 0010 - 8/19/2020 0057      8/19/2020             Pulse:  96    Ht Rate:  94    SpO2:  100 %                Electronically Signed By: Ekaterina Madera MD  August 19, 2020  12:57 AM

## 2020-08-19 NOTE — ED PROVIDER NOTES
[place] [facility] ([facility])  August 18, 2020  History     Chief Complaint   Patient presents with     Swallowed Foreign Body     HPI  Nevin Alvarado is a 29 year old female with a PMH for borderline personality disorder, recurrent rectal foreign body, recurrent swallowed foreign body, self-harm, PE, ADD, depression and anxiety who presents to the ED with complaint of swallowed foreign body.    Patient reports she ingested a spiral notebook coil about two hours ago. Patient states that she has been very stressed out about some recent changes to her psychiatric medications. This made her swallow a foreign object again. She denies abdominal pain. She denies nausea or vomiting or hematemesis. She last ate chicken and rice at 5pm today.     PAST MEDICAL HISTORY:   Past Medical History:   Diagnosis Date     ADD (attention deficit disorder)      Anorexia nervosa with bulimia     history of; on Topamax     Anxiety      Borderline personality disorder (H)      Depression      H/O self-harm      History of pulmonary embolism 12/2019    Provoked. Completed 3 month course of Apixaban     Morbid obesity (H)      Neuropathy      PTSD (post-traumatic stress disorder)      Rectal foreign body - Recurrent issue, self placed      Swallowed foreign body - Recurrent issue, self placed        PAST SURGICAL HISTORY:   Past Surgical History:   Procedure Laterality Date     COMBINED ESOPHAGOSCOPY, GASTROSCOPY, DUODENOSCOPY (EGD), REPLACE ESOPHAGEAL STENT N/A 10/9/2019    Procedure: Upper Endoscopy with Suture Placement;  Surgeon: Zurdo Ramirez MD;  Location: [facility]     ESOPHAGOSCOPY, GASTROSCOPY, DUODENOSCOPY (EGD), COMBINED N/A 3/9/2017    Procedure: COMBINED ESOPHAGOSCOPY, GASTROSCOPY, DUODENOSCOPY (EGD), REMOVE FOREIGN BODY;  Surgeon: Avis Guzmán MD;  Location: [facility]     ESOPHAGOSCOPY, GASTROSCOPY, DUODENOSCOPY (EGD), COMBINED N/A 4/20/2017    Procedure: COMBINED ESOPHAGOSCOPY,  GASTROSCOPY, DUODENOSCOPY (EGD), REMOVE FOREIGN BODY;  EGD removal Foregin body;  Surgeon: Lokesh Paula MD;  Location: [facility]     ESOPHAGOSCOPY, GASTROSCOPY, DUODENOSCOPY (EGD), COMBINED N/A 6/12/2017    Procedure: COMBINED ESOPHAGOSCOPY, GASTROSCOPY, DUODENOSCOPY (EGD);  COMBINED ESOPHAGOSCOPY, GASTROSCOPY, DUODENOSCOPY (EGD) [[id number]]attempted removal of foreign body;  Surgeon: Pamela Perez MD;  [facility]     ESOPHAGOSCOPY, GASTROSCOPY, DUODENOSCOPY (EGD), COMBINED N/A 6/9/2017    Procedure: COMBINED ESOPHAGOSCOPY, GASTROSCOPY, DUODENOSCOPY (EGD), REMOVE FOREIGN BODY;  Esophagoscopy, Gastroscopy, Duodenoscopy, Removal of Foreign Body;  Surgeon: Dejon Marsh MD;  Location: [facility]     ESOPHAGOSCOPY, GASTROSCOPY, DUODENOSCOPY (EGD), COMBINED N/A 1/6/2018    Procedure: COMBINED ESOPHAGOSCOPY, GASTROSCOPY, DUODENOSCOPY (EGD), REMOVE FOREIGN BODY;  COMBINED ESOPHAGOSCOPY, GASTROSCOPY, DUODENOSCOPY (EGD) [by pascal net and snare with profol sedation;  Surgeon: Feliciano Emmanuel MD;  Location:  GI     ESOPHAGOSCOPY, GASTROSCOPY, DUODENOSCOPY (EGD), COMBINED N/A 3/19/2018    Procedure: COMBINED ESOPHAGOSCOPY, GASTROSCOPY, DUODENOSCOPY (EGD), REMOVE FOREIGN BODY;   Esophagodscopy, Gastroscopy, Duodenoscopy,Foreign Body Removal;  Surgeon: Brice Guzmán MD;  [facility]     ESOPHAGOSCOPY, GASTROSCOPY, DUODENOSCOPY (EGD), COMBINED N/A 4/16/2018    Procedure: COMBINED ESOPHAGOSCOPY, GASTROSCOPY, DUODENOSCOPY (EGD), REMOVE FOREIGN BODY;  Esophagogastroduodenoscopy  Foreign Body Removal X 2;  Surgeon: Royer Moise MD;  [facility]     ESOPHAGOSCOPY, GASTROSCOPY, DUODENOSCOPY (EGD), COMBINED N/A 6/1/2018    Procedure: COMBINED ESOPHAGOSCOPY, GASTROSCOPY, DUODENOSCOPY (EGD), REMOVE FOREIGN BODY;  COMBINED ESOPHAGOSCOPY, GASTROSCOPY, DUODENOSCOPY with removal of foreign body, propofol sedation by anesthesia;  Surgeon: Brice Martinez MD;  Location: [facility]      ESOPHAGOSCOPY, GASTROSCOPY, DUODENOSCOPY (EGD), COMBINED N/A 7/25/2018    Procedure: COMBINED ESOPHAGOSCOPY, GASTROSCOPY, DUODENOSCOPY (EGD), REMOVE FOREIGN BODY;;  Surgeon: Candy Castelan MD;  Location:  GI     ESOPHAGOSCOPY, GASTROSCOPY, DUODENOSCOPY (EGD), COMBINED N/A 7/28/2018    Procedure: COMBINED ESOPHAGOSCOPY, GASTROSCOPY, DUODENOSCOPY (EGD), REMOVE FOREIGN BODY;  COMBINED ESOPHAGOSCOPY, GASTROSCOPY, DUODENOSCOPY (EGD), REMOVE FOREIGN BODY;  Surgeon: Brice Guzmán MD;  [facility]     ESOPHAGOSCOPY, GASTROSCOPY, DUODENOSCOPY (EGD), COMBINED N/A 7/31/2018    Procedure: COMBINED ESOPHAGOSCOPY, GASTROSCOPY, DUODENOSCOPY (EGD);  COMBINED ESOPHAGOSCOPY, GASTROSCOPY, DUODENOSCOPY (EGD) TO REMOVE FOREIGN BODY;  Surgeon: Lokesh Paula MD;  [facility]     ESOPHAGOSCOPY, GASTROSCOPY, DUODENOSCOPY (EGD), COMBINED N/A 8/4/2018    Procedure: COMBINED ESOPHAGOSCOPY, GASTROSCOPY, DUODENOSCOPY (EGD), REMOVE FOREIGN BODY;   combined esophagoscopy, gastroscopy, duodenoscopy, REMOVE FOREIGN BODY ;  Surgeon: Lokesh Paula MD;  [facility]     ESOPHAGOSCOPY, GASTROSCOPY, DUODENOSCOPY (EGD), COMBINED N/A 10/6/2019    Procedure: ESOPHAGOGASTRODUODENOSCOPY (EGD) with fireign body removal x2, esophageal stent placement with floroscopy;  Surgeon: Timoteo Espana MD;  [facility]     ESOPHAGOSCOPY, GASTROSCOPY, DUODENOSCOPY (EGD), COMBINED N/A 12/2/2019    Procedure: Esophagogastroduodenoscopy with esophageal stent removal, esophogram;  Surgeon: Kialee Tena MD;  [facility]: [facility]     ESOPHAGOSCOPY, GASTROSCOPY, DUODENOSCOPY (EGD), COMBINED N/A 12/17/2019    Procedure: ESOPHAGOGASTRODUODENOSCOPY, WITH FOREIGN BODY REMOVAL;  Surgeon: Pamlea Perez MD;  [facility]     ESOPHAGOSCOPY, GASTROSCOPY, DUODENOSCOPY (EGD), COMBINED N/A 12/13/2019    Procedure: ESOPHAGOGASTRODUODENOSCOPY, WITH FOREIGN BODY REMOVAL;  Surgeon: Samia Stanton MD;  [facility]      ESOPHAGOSCOPY, GASTROSCOPY, DUODENOSCOPY (EGD), COMBINED N/A 12/28/2019    Procedure: ESOPHAGOGASTRODUODENOSCOPY (EGD) Removal of Foreign Body X 2;  Surgeon: Huy Kelley MD;  [facility]     ESOPHAGOSCOPY, GASTROSCOPY, DUODENOSCOPY (EGD), COMBINED N/A 1/5/2020    Procedure: ESOPHAGOGASTRODUOENOSCOPY WITH FOREIGN BODY REMOVAL;  Surgeon: Pamela Perez MD;  [facility]     ESOPHAGOSCOPY, GASTROSCOPY, DUODENOSCOPY (EGD), COMBINED N/A 1/3/2020    Procedure: ESOPHAGOGASTRODUODENOSCOPY (EGD) REMOVAL OF FOREIGN BODY.;  Surgeon: Pamela Perez MD;  [facility]     ESOPHAGOSCOPY, GASTROSCOPY, DUODENOSCOPY (EGD), COMBINED N/A 1/13/2020    Procedure: ESOPHAGOGASTRODUODENOSCOPY (EGD) for foreign body removal;  Surgeon: Lokesh Paula MD;  [facility]     ESOPHAGOSCOPY, GASTROSCOPY, DUODENOSCOPY (EGD), COMBINED N/A 1/18/2020    Procedure: Diagnostic ESOPHAGOGASTRODUODENOSCOPY (EGD;  Surgeon: Lokesh Paula MD;  [facility]     ESOPHAGOSCOPY, GASTROSCOPY, DUODENOSCOPY (EGD), COMBINED N/A 3/29/2020    Procedure: UPPER ENDOSCOPY WITH FOREIGN BODY REMOVAL;  Surgeon: Doug Hansen MD;  [facility]     ESOPHAGOSCOPY, GASTROSCOPY, DUODENOSCOPY (EGD), COMBINED N/A 7/11/2020    Procedure: ESOPHAGOGASTRODUODENOSCOPY (EGD); Upper Endoscopy WITH FOREIGN BODY REMOVAL;  Surgeon: Lyndsey Mendoza DO;  [facility]     ESOPHAGOSCOPY, GASTROSCOPY, DUODENOSCOPY (EGD), COMBINED N/A 7/29/2020    Procedure: ESOPHAGOGASTRODUODENOSCOPY REMOVAL OF FOREIGN BODY;  Surgeon: Samia Stanton MD;  [facility]     ESOPHAGOSCOPY, GASTROSCOPY, DUODENOSCOPY (EGD), COMBINED N/A 8/1/2020    Procedure: ESOPHAGOGASTRODUODENOSCOPY, WITH FOREIGN BODY REMOVAL;  Surgeon: Pamela Perez MD;  [facility]     EXAM UNDER ANESTHESIA ANUS N/A 1/10/2017    Procedure: EXAM UNDER ANESTHESIA ANUS;  Surgeon: Annmarie Haynes MD;  [facility]     EXAM  UNDER ANESTHESIA RECTUM N/A 7/19/2018    Procedure: EXAM UNDER ANESTHESIA RECTUM;  EXAM UNDER ANESTHESIA, REMOVAL OF RECTAL FOREIGN BODY;  Surgeon: Annmarie Haynes MD;  [facility]: [facility]     HC REMOVE FECAL IMPACTION OR FB W ANESTHESIA N/A 12/18/2016    Procedure: REMOVE FECAL IMPACTION/FOREIGN BODY UNDER ANESTHESIA;  Surgeon: Soham Cano MD;  Location: [facility]     KNEE SURGERY - removed a small tissue mass from knee Right      LAPAROSCOPIC ABLATION ENDOMETRIOSIS       LAPAROTOMY EXPLORATORY N/A 1/10/2017    Procedure: LAPAROTOMY EXPLORATORY;  Surgeon: Annmarie Haynes MD;  [facility]: [facility]     LAPAROTOMY EXPLORATORY  09/11/2019    Manual manipulation of bowel to remove pill bottle in rectum     lymph nodes removed from neck; benign  age 7     MAMMOPLASTY REDUCTION Bilateral      RELEASE CARPAL TUNNEL Bilateral      SIGMOIDOSCOPY FLEXIBLE N/A 1/10/2017    Procedure: SIGMOIDOSCOPY FLEXIBLE;  Surgeon: Annmarie Haynes MD;  [facility]     SIGMOIDOSCOPY FLEXIBLE N/A 5/8/2018    Procedure: SIGMOIDOSCOPY FLEXIBLE;  flex sig with foreign body removal using snare and rattooth forcep;  Surgeon: Soham Cano MD;  Location:  GI     SIGMOIDOSCOPY FLEXIBLE N/A 2/20/2019    Procedure: Exam under anesthesia Colonoscopy with attempted  removal of rectal foreign body;  Surgeon: Estrada Chávez MD;  Location: [facility]       Past medical history, past surgical history, medications, and allergies were reviewed with the patient. Additional pertinent items: None    FAMILY HISTORY:   Family History   Problem Relation Age of Onset     Diabetes Type 2  Maternal Grandmother      Diabetes Type 2  Paternal Grandmother      Breast Cancer Paternal Grandmother      Cerebrovascular Disease Father 53     Myocardial Infarction No family hx of      Coronary Artery Disease Early Onset No family hx of      Ovarian Cancer No family hx of      Colon Cancer No family hx of        SOCIAL HISTORY:   Social History      Tobacco Use     Smoking status: Never Smoker     Smokeless tobacco: Never Used   Substance Use Topics     Alcohol use: No     Alcohol/week: 0.0 standard drinks     Social history was reviewed with the patient. Additional pertinent items: None      Current Discharge Medication List      CONTINUE these medications which have NOT CHANGED    Details   acetaminophen (TYLENOL) 32 mg/mL liquid Take 31.25 mLs (1,000 mg) by mouth every 6 hours as needed for fever or pain  Qty: 355 mL, Refills: 0    Associated Diagnoses: Esophageal dysphagia; Hx of foreign body ingestion      albuterol (PROAIR HFA/PROVENTIL HFA/VENTOLIN HFA) 108 (90 Base) MCG/ACT inhaler Inhale 2 puffs into the lungs as needed for shortness of breath / dyspnea or wheezing    Comments: Pharmacy may dispense brand covered by insurance (Proair, or proventil or ventolin or generic albuterol inhaler)      busPIRone (BUSPAR) 10 MG tablet Take 1 tablet (10 mg) by mouth twice daily      Cholecalciferol (VITAMIN D) 50 MCG (2000 UT) CAPS Take 2,000 Units by mouth daily       cloNIDine (CATAPRES) 0.1 MG tablet Take 0.1 mg by mouth 2 times daily       desvenlafaxine (PRISTIQ) 100 MG 24 hr tablet Take 1 tablet (100 mg) by mouth every morning      docosanol (ABREVA) 10 % CREA cream Apply to lip 5 times a day as soon as symptoms begin, do not use for more than 10 days. Used PRN.      hydroxychloroquine (PLAQUENIL) 200 MG tablet Take 200 mg by mouth 2 times daily      hydrOXYzine (ATARAX) 50 MG tablet Take 1 tablet (50 mg) by mouth 3 times daily as needed for anxiety  Qty: 30 tablet, Refills: 0    Associated Diagnoses: History of posttraumatic stress disorder (PTSD)      lurasidone (LATUDA) 60 MG TABS tablet Take 1 tablet (60 mg) by mouth at bedtime      metFORMIN (GLUCOPHAGE-XR) 500 MG 24 hr tablet Take 1,000 mg by mouth Take 1 tablet (500 mg) by mouth daily       norethindrone (MICRONOR) 0.35 MG tablet Take 0.35 mg by mouth daily      ondansetron (ZOFRAN-ODT) 4 MG ODT  tab Take 4 mg by mouth every 6 hours as needed for nausea or vomiting       pantoprazole (PROTONIX) 40 MG EC tablet Take 1 tablet (40 mg) by mouth daily  Qty: 30 tablet, Refills: 1    Comments: May stop after 8 weeks  Associated Diagnoses: Swallowed foreign body, subsequent encounter      pregabalin (LYRICA) 50 MG capsule Take 50 mg by mouth 3 times daily      Prenatal Vit-Fe Fumarate-FA (PNV PRENATAL PLUS MULTIVITAMIN) 27-1 MG TABS per tablet Take 1 tablet by mouth daily      topiramate (TOPAMAX) 100 MG tablet Take 50 mg by mouth Take 1 tablet (100 mg) by mouth daily at bedtime                 Allergies   Allergen Reactions     Amoxicillin-Pot Clavulanate Other (See Comments), Swelling and Rash     PN: facial swelling, left side. Also had cortisone injection the same day as she started the Augmentin.  Noted in downtime recovery of chart.       Penicillins Anaphylaxis     Vancomycin Itching, Swelling and Rash     Other reaction(s): Redness  Flushed face, very itchy; HUT Comment: Flushed face, very itchy; HUT Reaction: Angioedema; HUT Reaction: Redness; HUT Severity: Med; HUT Noted: [id number]       Hydrocodone Nausea and Vomiting and GI Disturbance     vomiting for days, PN: vomiting for days; HUT Comment: vomiting for days; HUT Reaction: Gastrointestinal; HUT Reaction: Nausea And Vomiting; HUT Reaction Type: Intolerance; HUT Severity: Med; HUT Noted: [id number]  vomiting for days       Blood-Group Specific Substance Other (See Comments)     Patient has an anti-Cw and non-specific antibodies. Blood product orders may be delayed. Draw one red top and two purple top tubes for all type/screen/crossmatch orders.     Influenza Vaccines Other (See Comments)     Oseltamivir Hives     med stopped, PN: med stopped     Cephalosporins Rash     Lamotrigine Rash     Possibly associated with Lamictal.   HUT Comment: Possibly associated with Lamictal. ; HUT Reaction: Rash; HUT Reaction Type: Allergy; HUT Severity: Low; HUT Noted:  [id number]     Latex Rash        Review of Systems  A complete review of systems was performed with pertinent positives and negatives noted in the HPI, and all other systems negative.    Physical Exam          Physical Exam  Constitutional:       General: She is not in acute distress.     Appearance: She is obese.   HENT:      Head: Normocephalic and atraumatic.      Nose: Nose normal.      Mouth/Throat:      Mouth: Mucous membranes are moist.      Pharynx: Oropharynx is clear.   Eyes:      Extraocular Movements: Extraocular movements intact.      Conjunctiva/sclera: Conjunctivae normal.      Pupils: Pupils are equal, round, and reactive to light.   Neck:      Musculoskeletal: Normal range of motion and neck supple.   Cardiovascular:      Rate and Rhythm: Normal rate and regular rhythm.   Pulmonary:      Effort: Pulmonary effort is normal. No respiratory distress.   Chest:      Chest wall: No tenderness.   Abdominal:      General: Bowel sounds are normal. There is no distension.      Palpations: Abdomen is soft.      Tenderness: There is no abdominal tenderness. There is no guarding.   Musculoskeletal: Normal range of motion.   Skin:     General: Skin is warm and dry.   Neurological:      General: No focal deficit present.      Mental Status: She is alert and oriented to person, place, and time.   Psychiatric:         Mood and Affect: Mood normal.         ED Course        Procedures          Pt was evaluated by Verónica Frausto NP at 2115         Results for orders placed or performed during the hospital encounter of 08/18/20 (from the past 24 hour(s))   XR Abdomen Port 1 View    Narrative    XR ABDOMEN PORT 1 VW on 8/18/2020 9:27 PM.    INDICATION: swallowed notebook spiral.    COMPARISON: Radiograph dated 8/1/2020    FINDINGS:   Portable AP upright radiograph of the abdomen. Linear radiopaque  foreign object projecting over the expected location of the stomach,  not significantly changed from 8/1/2020. Partially  visualized right  chest Port-A-Cath with tip projecting over the high right atrium. No  abnormally dilated loops of bowel in the upper abdomen. No portal  venous gas. S shaped curvature of the thoracolumbar spine. The lung  bases are relatively clear.      Impression    IMPRESSION:   Linear radiopaque foreign object projecting over the expected location  of the stomach, not significantly changed from 8/1/2020.    I have personally reviewed the examination and initial interpretation  and I agree with the findings.    CHINO PERAZA MD   Basic metabolic panel   Result Value Ref Range    Sodium 140 133 - 144 mmol/L    Potassium 3.5 3.4 - 5.3 mmol/L    Chloride 109 94 - 109 mmol/L    Carbon Dioxide 25 20 - 32 mmol/L    Anion Gap 6 3 - 14 mmol/L    Glucose 121 (H) 70 - 99 mg/dL    Urea Nitrogen 11 7 - 30 mg/dL    Creatinine 0.61 0.52 - 1.04 mg/dL    GFR Estimate >90 >60 mL/min/[1.73_m2]    GFR Estimate If Black >90 >60 mL/min/[1.73_m2]    Calcium 8.8 8.5 - 10.1 mg/dL     Medications   simethicone 133mg/2mL oral suspension (4 mLs Oral Given 8/18/20 2302)             Assessments & Plan (with Medical Decision Making)   29 yr old female with extensive psychiatric history and many visits to the Emergency Dept for ingestion of foreign bodies. She presents to day with an xray confirmed piece of wire in the stomach that appears about 10cm long. She is in no distress. She has no pain. GI was contacted and they have agreed to remove the object. Plan for admission to medicine service after EGD for removal of foreign object. Covid PCR pending.     Physician Attestation   I, Jeffy Velasquez MD, saw this patient with the resident and agree with the resident/fellow's findings and plan of care as documented in the note.      I personally reviewed vital signs, labs and imaging.  This is a 29-year-old female patient well-known to the emergency room for frequent ingestions of foreign bodies.  Today she ingested a straightened  piece of metal a notebook.  On physical exam she is in no obvious distress.  Her vitals are otherwise stable.  X-ray confirms that there is a foreign body in the stomach.  Emergency department contacted the GI fellow who is on-call.  At this time their recommendations are visits to the operating room for retrieval.  This will happen this evening and the patient be admitted to the medicine service.    Key findings: Swallowed foreign body    Jeffy Velasquez MD  Date of Service (when I saw the patient): 08/18/20    I have reviewed the nursing notes.    I have reviewed the findings, diagnosis, plan and need for follow up with the patient.    Current Discharge Medication List          Final diagnoses:   Swallowed foreign body, initial encounter       8/18/2020   [facility], [place], EMERGENCY DEPARTMENT     Jeffy Velasquez MD  08/18/20 1053

## 2020-08-19 NOTE — CONSULTS
[facility], [place]   Initial Psychiatric Consult   Consult date: August 19, 2020         Reason for Consult, requesting source:    Hx BPD, PTSD, ADD, admitted for foreign body ingestion s/p EGD. Assist with safe dispo  Requesting source: Champ Lau        HPI:     Ms. Nevin Alvarado is a 29-year-old female who was admitted on 8/19/20 for EGD retrieval after swallowing a spiral notebook coil secondary to anxiety. PMH significant for borderline personality disorder, PTSD, unspecified anxiety disorder, hx of PE, and frequent admissions for foreign body ingestion. Psychiatry is consulted for safe disposition.    On interview today, Ms. Alvarado confirms that she had swallowed the piece of spiral notebook coil due to feeling quite anxious. She reports several recent medication changes which she feels led to increased anxiety. She reports that her psychiatrist discontinued Topamax as it had led to decreased appetite. She says her buspirone was increased and naltrexone was initiated to help reduce self-harm urges.     Ms. Alvarado denies any specific trigger for her episode of anxiety yesterday leading to the ingestion. She reports that she talked to her ILS worker for two hours. She also reached out to her old [facility] nurse. She denies that these interventions were effective. She reports taking hydroxyzine for anxiety but says it makes her quite sleepy. She was encouraged to try to cut the dose in half if she feels that it is too sedating.     She denies any recent feelings of depression, sadness, or hopelessness. She denies suicidal ideation or homicidal ideation. She denies any ongoing self-harm urges. Denies auditory or visual hallucinations.    She reports that she has an individual therapy appointment at 3pm today, as well as group DBT via webcam at 5pm. She is hoping to be able to attend these appointments.         Past Psychiatric History:     Per chart review,  "most recently seen in July 2020 after ingesting a paperclip.     On January 17th 2020 patient went to the ED after ingesting a pen spring after being placed under guardianship on 1/16/2020.  Additionally admitted to ED on 1/13/2020, 1/5/2020, 12/28/19 after swallowing pen springs and numerous other ED visits for foreign body ingestion.    Most recently inpatient hospitalization on 3/20/18 through 3/21/2018 after swallowing hairpins in an attempt to self-harm in the context of increasing depressive symptoms and worsening compulsions to self injure. Previously hospitalized on 2/13/2018 for ingestion of Alberto pins. Patient was discharged with plan for outpatient follow-up.  Patient has had numerous psychiatric hospitalizations for swallowing foreign bodies were his rotation with and without intent to harm herself.    Court Committments: She was under \"emergency guardianship\" until 4/15/2020.  Per chart history of commitment on 12/2015-12/2017  Suicide attempts: 4 overdoses in the past 2 years most recently on 4/17/2017 with oxycodone (though she denies intent to self harm) and in 2015.  Numerous foreign body ingestions and an's rotations requiring at least 12 EGDs, 2x EUAs, and 3 ex lap's since 2016.        Substance Use and History:     No drug or alcohol use. No CD treatments.         Past Medical History:   PAST MEDICAL HISTORY:   Past Medical History:   Diagnosis Date     ADD (attention deficit disorder)      Anorexia nervosa with bulimia     history of; on Topamax     Anxiety      Borderline personality disorder (H)      Depression      H/O self-harm      History of pulmonary embolism 12/2019    Provoked. Completed 3 month course of Apixaban     Morbid obesity (H)      Neuropathy      PTSD (post-traumatic stress disorder)      Rectal foreign body - Recurrent issue, self placed      Swallowed foreign body - Recurrent issue, self placed        PAST SURGICAL HISTORY:   Past Surgical History:   Procedure Laterality " Date     COMBINED ESOPHAGOSCOPY, GASTROSCOPY, DUODENOSCOPY (EGD), REPLACE ESOPHAGEAL STENT N/A 10/9/2019    Procedure: Upper Endoscopy with Suture Placement;  Surgeon: Zurdo Ramirez MD;  [facility]     ESOPHAGOSCOPY, GASTROSCOPY, DUODENOSCOPY (EGD), COMBINED N/A 3/9/2017    Procedure: COMBINED ESOPHAGOSCOPY, GASTROSCOPY, DUODENOSCOPY (EGD), REMOVE FOREIGN BODY;  Surgeon: Avis Guzmán MD;  [facility]     ESOPHAGOSCOPY, GASTROSCOPY, DUODENOSCOPY (EGD), COMBINED N/A 4/20/2017    Procedure: COMBINED ESOPHAGOSCOPY, GASTROSCOPY, DUODENOSCOPY (EGD), REMOVE FOREIGN BODY;  EGD removal Foregin body;  Surgeon: Lokesh Paula MD;  [facility]     ESOPHAGOSCOPY, GASTROSCOPY, DUODENOSCOPY (EGD), COMBINED N/A 6/12/2017    Procedure: COMBINED ESOPHAGOSCOPY, GASTROSCOPY, DUODENOSCOPY (EGD);  COMBINED ESOPHAGOSCOPY, GASTROSCOPY, DUODENOSCOPY (EGD) [[id number]]attempted removal of foreign body;  Surgeon: Pamela Perez MD;  [facility]     ESOPHAGOSCOPY, GASTROSCOPY, DUODENOSCOPY (EGD), COMBINED N/A 6/9/2017    Procedure: COMBINED ESOPHAGOSCOPY, GASTROSCOPY, DUODENOSCOPY (EGD), REMOVE FOREIGN BODY;  Esophagoscopy, Gastroscopy, Duodenoscopy, Removal of Foreign Body;  Surgeon: Dejon Marsh MD;  [facility]     ESOPHAGOSCOPY, GASTROSCOPY, DUODENOSCOPY (EGD), COMBINED N/A 1/6/2018    Procedure: COMBINED ESOPHAGOSCOPY, GASTROSCOPY, DUODENOSCOPY (EGD), REMOVE FOREIGN BODY;  COMBINED ESOPHAGOSCOPY, GASTROSCOPY, DUODENOSCOPY (EGD) [by pascal net and snare with profol sedation;  Surgeon: Feliciano Emmanuel MD;  Location:  GI     ESOPHAGOSCOPY, GASTROSCOPY, DUODENOSCOPY (EGD), COMBINED N/A 3/19/2018    Procedure: COMBINED ESOPHAGOSCOPY, GASTROSCOPY, DUODENOSCOPY (EGD), REMOVE FOREIGN BODY;   Esophagodscopy, Gastroscopy, Duodenoscopy,Foreign Body Removal;  Surgeon: Brice Guzmán MD;  [facility]     ESOPHAGOSCOPY, GASTROSCOPY, DUODENOSCOPY (EGD), COMBINED N/A  4/16/2018    Procedure: COMBINED ESOPHAGOSCOPY, GASTROSCOPY, DUODENOSCOPY (EGD), REMOVE FOREIGN BODY;  Esophagogastroduodenoscopy  Foreign Body Removal X 2;  Surgeon: Royer Moise MD;  [facility]     ESOPHAGOSCOPY, GASTROSCOPY, DUODENOSCOPY (EGD), COMBINED N/A 6/1/2018    Procedure: COMBINED ESOPHAGOSCOPY, GASTROSCOPY, DUODENOSCOPY (EGD), REMOVE FOREIGN BODY;  COMBINED ESOPHAGOSCOPY, GASTROSCOPY, DUODENOSCOPY with removal of foreign body, propofol sedation by anesthesia;  Surgeon: Brice Martinez MD;  Location:  GI     ESOPHAGOSCOPY, GASTROSCOPY, DUODENOSCOPY (EGD), COMBINED N/A 7/25/2018    Procedure: COMBINED ESOPHAGOSCOPY, GASTROSCOPY, DUODENOSCOPY (EGD), REMOVE FOREIGN BODY;;  Surgeon: Candy Castelan MD;  Location:  GI     ESOPHAGOSCOPY, GASTROSCOPY, DUODENOSCOPY (EGD), COMBINED N/A 7/28/2018    Procedure: COMBINED ESOPHAGOSCOPY, GASTROSCOPY, DUODENOSCOPY (EGD), REMOVE FOREIGN BODY;  COMBINED ESOPHAGOSCOPY, GASTROSCOPY, DUODENOSCOPY (EGD), REMOVE FOREIGN BODY;  Surgeon: Brice Guzmán MD;  [facility]     ESOPHAGOSCOPY, GASTROSCOPY, DUODENOSCOPY (EGD), COMBINED N/A 7/31/2018    Procedure: COMBINED ESOPHAGOSCOPY, GASTROSCOPY, DUODENOSCOPY (EGD);  COMBINED ESOPHAGOSCOPY, GASTROSCOPY, DUODENOSCOPY (EGD) TO REMOVE FOREIGN BODY;  Surgeon: Lokesh Paula MD;  [facility]     ESOPHAGOSCOPY, GASTROSCOPY, DUODENOSCOPY (EGD), COMBINED N/A 8/4/2018    Procedure: COMBINED ESOPHAGOSCOPY, GASTROSCOPY, DUODENOSCOPY (EGD), REMOVE FOREIGN BODY;   combined esophagoscopy, gastroscopy, duodenoscopy, REMOVE FOREIGN BODY ;  Surgeon: Lokesh Paula MD;  [facility]     ESOPHAGOSCOPY, GASTROSCOPY, DUODENOSCOPY (EGD), COMBINED N/A 10/6/2019    Procedure: ESOPHAGOGASTRODUODENOSCOPY (EGD) with fireign body removal x2, esophageal stent placement with floroscopy;  Surgeon: Timoteo Espana MD;  [facility]     ESOPHAGOSCOPY, GASTROSCOPY, DUODENOSCOPY (EGD), COMBINED N/A  12/2/2019    Procedure: Esophagogastroduodenoscopy with esophageal stent removal, esophogram;  Surgeon: Kailee Tena MD;  [facility]     ESOPHAGOSCOPY, GASTROSCOPY, DUODENOSCOPY (EGD), COMBINED N/A 12/17/2019    Procedure: ESOPHAGOGASTRODUODENOSCOPY, WITH FOREIGN BODY REMOVAL;  Surgeon: Pamela Perez MD;  [facility]     ESOPHAGOSCOPY, GASTROSCOPY, DUODENOSCOPY (EGD), COMBINED N/A 12/13/2019    Procedure: ESOPHAGOGASTRODUODENOSCOPY, WITH FOREIGN BODY REMOVAL;  Surgeon: Samia Stanton MD;  [facility]     ESOPHAGOSCOPY, GASTROSCOPY, DUODENOSCOPY (EGD), COMBINED N/A 12/28/2019    Procedure: ESOPHAGOGASTRODUODENOSCOPY (EGD) Removal of Foreign Body X 2;  Surgeon: Huy Kelley MD;  [facility]     ESOPHAGOSCOPY, GASTROSCOPY, DUODENOSCOPY (EGD), COMBINED N/A 1/5/2020    Procedure: ESOPHAGOGASTRODUOENOSCOPY WITH FOREIGN BODY REMOVAL;  Surgeon: Pamela Perez MD;  [facility]     ESOPHAGOSCOPY, GASTROSCOPY, DUODENOSCOPY (EGD), COMBINED N/A 1/3/2020    Procedure: ESOPHAGOGASTRODUODENOSCOPY (EGD) REMOVAL OF FOREIGN BODY.;  Surgeon: Pamela Perez MD;  [facility]     ESOPHAGOSCOPY, GASTROSCOPY, DUODENOSCOPY (EGD), COMBINED N/A 1/13/2020    Procedure: ESOPHAGOGASTRODUODENOSCOPY (EGD) for foreign body removal;  Surgeon: Lokesh Paula MD;  [facility]     ESOPHAGOSCOPY, GASTROSCOPY, DUODENOSCOPY (EGD), COMBINED N/A 1/18/2020    Procedure: Diagnostic ESOPHAGOGASTRODUODENOSCOPY (EGD;  Surgeon: Lokesh Paula MD;  [facility]     ESOPHAGOSCOPY, GASTROSCOPY, DUODENOSCOPY (EGD), COMBINED N/A 3/29/2020    Procedure: UPPER ENDOSCOPY WITH FOREIGN BODY REMOVAL;  Surgeon: Doug Hansen MD;  [facility]     ESOPHAGOSCOPY, GASTROSCOPY, DUODENOSCOPY (EGD), COMBINED N/A 7/11/2020    Procedure: ESOPHAGOGASTRODUODENOSCOPY (EGD); Upper Endoscopy WITH FOREIGN BODY REMOVAL;  Surgeon: Lyndsey Mendoza DO;  [facility]      ESOPHAGOSCOPY, GASTROSCOPY, DUODENOSCOPY (EGD), COMBINED N/A 7/29/2020    Procedure: ESOPHAGOGASTRODUODENOSCOPY REMOVAL OF FOREIGN BODY;  Surgeon: Samia Stanton MD;  [facility]     ESOPHAGOSCOPY, GASTROSCOPY, DUODENOSCOPY (EGD), COMBINED N/A 8/1/2020    Procedure: ESOPHAGOGASTRODUODENOSCOPY, WITH FOREIGN BODY REMOVAL;  Surgeon: Pamela Perez MD;  [facility]     EXAM UNDER ANESTHESIA ANUS N/A 1/10/2017    Procedure: EXAM UNDER ANESTHESIA ANUS;  Surgeon: Annmarie Haynes MD;  [facility]     EXAM UNDER ANESTHESIA RECTUM N/A 7/19/2018    Procedure: EXAM UNDER ANESTHESIA RECTUM;  EXAM UNDER ANESTHESIA, REMOVAL OF RECTAL FOREIGN BODY;  Surgeon: Annmarie Haynes MD;  [facility]     HC REMOVE FECAL IMPACTION OR FB W ANESTHESIA N/A 12/18/2016    Procedure: REMOVE FECAL IMPACTION/FOREIGN BODY UNDER ANESTHESIA;  Surgeon: Soham Cano MD;  Location:  OR     KNEE SURGERY - removed a small tissue mass from knee Right      LAPAROSCOPIC ABLATION ENDOMETRIOSIS       LAPAROTOMY EXPLORATORY N/A 1/10/2017    Procedure: LAPAROTOMY EXPLORATORY;  Surgeon: Annmarie Haynes MD;  [facility]     LAPAROTOMY EXPLORATORY  09/11/2019    Manual manipulation of bowel to remove pill bottle in rectum     lymph nodes removed from neck; benign  age 7     MAMMOPLASTY REDUCTION Bilateral      RELEASE CARPAL TUNNEL Bilateral      SIGMOIDOSCOPY FLEXIBLE N/A 1/10/2017    Procedure: SIGMOIDOSCOPY FLEXIBLE;  Surgeon: Annmarie Haynes MD;  [facility]     SIGMOIDOSCOPY FLEXIBLE N/A 5/8/2018    Procedure: SIGMOIDOSCOPY FLEXIBLE;  flex sig with foreign body removal using snare and rattooth forcep;  Surgeon: Soham Cano MD;  Location:  GI     SIGMOIDOSCOPY FLEXIBLE N/A 2/20/2019    Procedure: Exam under anesthesia Colonoscopy with attempted  removal of rectal foreign body;  Surgeon: Estrada Chávez MD;  [facility]             Family History:   FAMILY  HISTORY:   Family History   Problem Relation Age of Onset     Diabetes Type 2  Maternal Grandmother      Diabetes Type 2  Paternal Grandmother      Breast Cancer Paternal Grandmother      Cerebrovascular Disease Father 53     Myocardial Infarction No family hx of      Coronary Artery Disease Early Onset No family hx of      Ovarian Cancer No family hx of      Colon Cancer No family hx of            Social History:   Patient grew up in [place], has 4 siblings, one sister lives next door to her. She is not currently working, was laid off in March. Had been working at a dry .          Physical ROS:   The patient denies headache, dizziness, pain, n/v, SOB. The remainder of 10-point review of systems was negative except as noted in HPI.         PTA Medications:     Medications Prior to Admission   Medication Sig Dispense Refill Last Dose     acetaminophen (TYLENOL) 32 mg/mL liquid Take 31.25 mLs (1,000 mg) by mouth every 6 hours as needed for fever or pain 355 mL 0      albuterol (PROAIR HFA/PROVENTIL HFA/VENTOLIN HFA) 108 (90 Base) MCG/ACT inhaler Inhale 2 puffs into the lungs as needed for shortness of breath / dyspnea or wheezing        busPIRone (BUSPAR) 10 MG tablet Take 1 tablet (10 mg) by mouth twice daily        Cholecalciferol (VITAMIN D) 50 MCG (2000 UT) CAPS Take 2,000 Units by mouth daily         cloNIDine (CATAPRES) 0.1 MG tablet Take 0.1 mg by mouth 2 times daily         desvenlafaxine (PRISTIQ) 100 MG 24 hr tablet Take 1 tablet (100 mg) by mouth every morning        docosanol (ABREVA) 10 % CREA cream Apply to lip 5 times a day as soon as symptoms begin, do not use for more than 10 days. Used PRN.        hydroxychloroquine (PLAQUENIL) 200 MG tablet Take 200 mg by mouth 2 times daily        hydrOXYzine (ATARAX) 50 MG tablet Take 1 tablet (50 mg) by mouth 3 times daily as needed for anxiety 30 tablet 0      lurasidone (LATUDA) 60 MG TABS tablet Take 1 tablet (60 mg) by mouth at bedtime         metFORMIN (GLUCOPHAGE-XR) 500 MG 24 hr tablet Take 1,000 mg by mouth Take 1 tablet (500 mg) by mouth daily         norethindrone (MICRONOR) 0.35 MG tablet Take 0.35 mg by mouth daily        ondansetron (ZOFRAN-ODT) 4 MG ODT tab Take 4 mg by mouth every 6 hours as needed for nausea or vomiting         pantoprazole (PROTONIX) 40 MG EC tablet Take 1 tablet (40 mg) by mouth daily 30 tablet 1      pregabalin (LYRICA) 50 MG capsule Take 50 mg by mouth 3 times daily        Prenatal Vit-Fe Fumarate-FA (PNV PRENATAL PLUS MULTIVITAMIN) 27-1 MG TABS per tablet Take 1 tablet by mouth daily        [DISCONTINUED] topiramate (TOPAMAX) 100 MG tablet Take 50 mg by mouth Take 1 tablet (100 mg) by mouth daily at bedtime              Allergies:     Allergies   Allergen Reactions     Amoxicillin-Pot Clavulanate Other (See Comments), Swelling and Rash     PN: facial swelling, left side. Also had cortisone injection the same day as she started the Augmentin.  Noted in downtime recovery of chart.       Penicillins Anaphylaxis     Vancomycin Itching, Swelling and Rash     Other reaction(s): Redness  Flushed face, very itchy; HUT Comment: Flushed face, very itchy; HUT Reaction: Angioedema; HUT Reaction: Redness; HUT Severity: Med; HUT Noted: [id number]       Hydrocodone Nausea and Vomiting and GI Disturbance     vomiting for days, PN: vomiting for days; HUT Comment: vomiting for days; HUT Reaction: Gastrointestinal; HUT Reaction: Nausea And Vomiting; HUT Reaction Type: Intolerance; HUT Severity: Med; HUT Noted: [id number]  vomiting for days       Blood-Group Specific Substance Other (See Comments)     Patient has an anti-Cw and non-specific antibodies. Blood product orders may be delayed. Draw one red top and two purple top tubes for all type/screen/crossmatch orders.     Influenza Vaccines Other (See Comments)     Oseltamivir Hives     med stopped, PN: med stopped     Cephalosporins Rash     Lamotrigine Rash     Possibly associated with  Lamictal.   HUT Comment: Possibly associated with Lamictal. ; HUT Reaction: Rash; HUT Reaction Type: Allergy; HUT Severity: Low; HUT Noted: [id number]     Latex Rash          Labs:     Recent Results (from the past 48 hour(s))   Asymptomatic COVID-19 Virus (Coronavirus) by PCR    Collection Time: 08/18/20 10:27 PM    Specimen: Nasopharyngeal   Result Value Ref Range    COVID-19 Virus PCR to [facility] - Source Nasopharyngeal     COVID-19 Virus PCR to [facility] - Result       Test received-See reflex to IDDL test SARS CoV2 (COVID-19) Virus RT-PCR   SARS-CoV-2 COVID-19 Virus (Coronavirus) RT-PCR Nasopharyngeal    Collection Time: 08/18/20 10:27 PM    Specimen: Nasopharyngeal   Result Value Ref Range    SARS-CoV-2 Virus Specimen Source Nasopharyngeal     SARS-CoV-2 PCR Result NEGATIVE     SARS-CoV-2 PCR Comment       Testing was performed using the Xpert Xpress SARS-CoV-2 Assay on the Cepheid Gene-Xpert   Instrument Systems. Additional information about this Emergency Use Authorization (EUA)   assay can be found via the Lab Guide.     UPPER GI ENDOSCOPY    Collection Time: 08/18/20 10:44 PM   Result Value Ref Range    Upper GI Endoscopy       [address] [place] [address] [phone]     Endoscopy Department  _______________________________________________________________________________  Patient Name: Nevin Alvarado     Procedure Date: 8/18/2020 10:44 PM  MRN: [id number]                       Account Number: [id number]  YOB: 1990             Admit Type: Outpatient  Age: 29                                Gender: Female  Note Status: Finalized                Attending MD: Pamela Perez MD  Total Sedation Time:                    _______________________________________________________________________________     Procedure:           Upper GI endoscopy  Indications:         Foreign body in the stomach  Providers:           Pamela Perez MD, Berenice Sandoval  Nadir  Patient Profile:     Ms. Alvarado is a 29 year old with history of                        pulmonary embolus, BPD, ADD, PTSD and repeated foreign                       body ingestions who  presents with a straightened metal                        wire from a spiral-bound notebook in the stomach (seen                        on AXR). She's had numerous admissions and ED                        presentations here in our system over the years with                        several EGDs in the last month for foreign body removal                        in the , , [facility], and [facility].  Referring MD:          Medicines:           General Anesthesia  Complications:       No immediate complications.  _______________________________________________________________________________  Procedure:           Pre-Anesthesia Assessment:                       - Prior to the procedure, a History and Physical was                        performed, and patient medications and allergies were                        reviewed. The patient is competent. The risks and                        benefits of the procedure and the sedation options and                        risks were discussed w ith the patient. All questions                        were answered and informed consent was obtained. Patient                        identification and proposed procedure were verified by                        the physician, the nurse and the anesthetist in the                        procedure room. Mental Status Examination: normal.                        Airway Examination: normal oropharyngeal airway and neck                        mobility and Mallampati Class III (part of the uvula and                        soft palate visualized). Respiratory Examination: clear                        to auscultation. CV Examination: normal. Prophylactic                        Antibiotics: The patient does not require prophylactic                         antibiotics. Prior Anticoagulants: The patient has taken                        no previous anticoagulant or antiplatelet agents. ASA                        Grade Assessment: II - A patient with mild systemic                        disease. Afte r reviewing the risks and benefits, the                        patient was deemed in satisfactory condition to undergo                        the procedure. The anesthesia plan was to use general                        anesthesia. Immediately prior to administration of                        medications, the patient was re-assessed for adequacy to                        receive sedatives. The heart rate, respiratory rate,                        oxygen saturations, blood pressure, adequacy of                        pulmonary ventilation, and response to care were                        monitored throughout the procedure. The physical status                        of the patient was re-assessed after the procedure.                       After obtaining informed consent, the endoscope was                        passed under direct vision. Throughout the procedure,                        the patient's blood pressure, pulse, and oxygen                        saturations were monitored consuelo nuously. The Endoscope                        was introduced through the mouth, and advanced to the                        second part of duodenum. The upper GI endoscopy was                        accomplished without difficulty. The patient tolerated                        the procedure well.                                                                                   Findings:       One benign-appearing, intrinsic mild stenosis was found. This stenosis        measured less than one cm (in length). The stenosis was traversed. This        appeared similar to recent endoscopic exams.       The exam of the esophagus was otherwise normal.       A medium amount of food (residue) was  found in the gastric fundus and in        the gastric body.       A metal wire was found in the gastric body. Removal was accomplished        with a snare and foreign body simons.       Small erosions were seen with minimal oozing which appeared to be from        trauma from the metal wire. The exam of the  stomach was otherwise normal.       The examined duodenum was normal.                                                                                   Impression:          - Benign-appearing esophageal stenosis.                       - A medium amount of food (residue) in the stomach.                       - A metal wire was found in the stomach.                       - Normal examined duodenum.                       - No specimens collected.  Recommendation:      - [facility]                       - Resume previous diet.                       - Continue present medications.                       - Recommend one-to-one sitter with patient.                       - Agree with continued assistance from mental health                        team.                                                                                     Pamela Perez MD  _______________________  Pamela Perez MD  8/19/2020 12:36:57 AM  I was physically present f or the entire viewing portion of the exam.  __________________________  Signature of teaching physician  B4c/Paulina Perez MD    _________________________  Berenice Schmitz,   Number of Addenda: 0    Note Initiated On: 8/18/2020 10:44 PM     Basic metabolic panel    Collection Time: 08/18/20 10:54 PM   Result Value Ref Range    Sodium 140 133 - 144 mmol/L    Potassium 3.5 3.4 - 5.3 mmol/L    Chloride 109 94 - 109 mmol/L    Carbon Dioxide 25 20 - 32 mmol/L    Anion Gap 6 3 - 14 mmol/L    Glucose 121 (H) 70 - 99 mg/dL    Urea Nitrogen 11 7 - 30 mg/dL    Creatinine 0.61 0.52 - 1.04 mg/dL    GFR Estimate >90 >60  "mL/min/[1.73_m2]    GFR Estimate If Black >90 >60 mL/min/[1.73_m2]    Calcium 8.8 8.5 - 10.1 mg/dL          Physical and Psychiatric Examination:     /72 (BP Location: Left arm)   Pulse 82   Temp 98  F (36.7  C) (Oral)   Resp 16   SpO2 98%   Weight is 0 lbs 0 oz  There is no height or weight on file to calculate BMI.    Physical Exam:  I have reviewed the physical exam as documented by by the medical team and agree with findings and assessment and have no additional findings to add at this time.    Mental Status Exam:    Appearance: age-appearing, lying in hospital bed, wearing gown, adequate grooming, in no acute distress  Behavior: calm, cooperative, displaying appropriate eye contact  Orientation: alert and oriented to time, place and person  Movements: no abnormal or involuntary movements noted  Mood: \"good\"  Affect: mood-congruent, stable, within a normal range  Speech: Normal rate, rhythm, tone  Memory: no impairment in immediate, recent, or remote memory  Fund of knowledge: average for development based on word choice  Concentration: attentive to interview  Thought process and content: linear and organized. Denies auditory or visual hallucinations. No delusions or paranoia endorsed or elicited on exam.   Insight: fair, able to identify symptoms and stressors  Judgement: fair, improving. Cooperative with medical cares  Safety: denies suicidal or homicidal ideation, plan, or intent         DSM-5 Diagnosis:   #1 Borderline Personality Disorder  #2 Unspecified anxiety disorder  #3 PTSD, by history  #4 Ineffective coping          Assessment:   Ms. Nevin Alvarado is a 29-year-old female who was admitted on 8/19/20 for EGD retrieval of foreign body after ingestion of a spiral notebook coil. History of multiple prior admissions for the same. History of borderline personality disorder, PTSD, and anxiety. She reports several recent medication changes, including stopping Topamax, increasing buspirone, and " starting naltrexone. She feels that these medication changes made her feel more anxious. She had an acute episode of anxiety yesterday, which led her to swallow a piece of spiral notebook coil. She says she attempted to use alternative coping skills, talked to her ILS worker and a home health nurse for 2 hours prior to swallowing the object. She is in group DBT therapy, as well as individual therapy. DBT is the mainstay therapy for this type of behavior. At this point, she is not suicidal and has no further urges to harm herself or swallow anything. She is safe to discharge from a psychiatric perspective.           Summary of Recommendations:   1) Patient is safe to discharge from a psychiatric perspective. Can discontinue 1:1 staffing and suicide precautions    2) Patient was encouraged to update her outpatient psychiatrist on her condition    3) She will follow-up with her outpatient therapist at [facility] at 3pm. She has DBT group therapy by video at 5pm.     4) No medication changes    5) Thank you for this consult. Please reconsult psychiatry as needed or page [phone].

## 2020-08-19 NOTE — H&P
[facility], [place]     History and Physical - [facility]        Date of Admission: 08/19/20      Assessment & Plan  29F with history of PE, BPD, ADD, anorexia, PTSD and frequent admissions for foreign body ingestion who is admitted after swallowing a spiral notebook coil now s/p EGD with retrieval.     Ingested spiral notebook coil  Patient reports ingesting the coil out of anxiety due to recent changes in her psych meds and needing time to adjust. Went to EGD with GI from the ED and coil was successfully retrieved. She has had frequent similar admissions, with a trend toward worsening recently.  - Cleared by GI to return to her regular diet  - Tylenol PRN pain  - Patient previously had a guardian but this has been ended by the court  - Sitter, swallow precautions    Chronic problems:   PTSD, BPD, ADD - continue pta buspirone, desvenlafaxine, hydroxyzine, lurasidone, pregabalin, topiramate  H/o granuloma annulare - continue plaquenil  Metabolic syndrome - continue metformin  H/o pulmonary embolism - no longer on anticoagulation       Diet: Regular  Fluids: PO  DVT Prophylaxis: Not indicated  Code Status: Full    Disposition:  Expected discharge: today or tomorrow, recommended to prior living arrangement once follow up plan in place.      Patient to be formally staffed in the AM.    Roxanna Obregon MD  Internal Medicine, PGY-2  [facility] Service  Pager: [phone]      _____________________________________________________________________    Chief Complaint  Swallowed a spiral notebook coil    History of Present Illness  Nevin Alvarado is a 29-year-old woman with history of PE, BPD, ADD, anorexia, PTSD and frequent admissions for foreign body ingestion who presents a couple of hours after swallowing a spiral notebook coil.    Patient reports that last week she had some adjustments in her psych meds. She wanted the changes, but feels like they will take some getting used to.  Because of this she just felt more anxious in general for the last few days and ultimately resulted in her swallowing a notebook coil. She denies any other new symptoms. She wants to leave now that she has had the EGD because she has a therapy appointment this afternoon that she doesn't want to miss.      10-point ROS is negative except as mentioned in HPI.      Past Medical History  ADD  Anorexia nervosa  Anxiety  Borderline personality disorder  Depression  H/o PE, provoked, no longer on anticoagulation  Obesity  Neuropathy  PTSD  Swallowed foreign body, recurrent    Social History  Social History     Socioeconomic History     Marital status: Single     Spouse name: None     Number of children: None     Years of education: None     Highest education level: None   Occupational History     Occupation: On disability   Social Needs     Financial resource strain: None     Food insecurity     Worry: None     Inability: None     Transportation needs     Medical: None     Non-medical: None   Tobacco Use     Smoking status: Never Smoker     Smokeless tobacco: Never Used   Substance and Sexual Activity     Alcohol use: No     Alcohol/week: 0.0 standard drinks     Drug use: No     Sexual activity: Yes     Partners: Male     Birth control/protection: I.U.D.     Comment: IUD - Mirena - placed July, 2015   Lifestyle     Physical activity     Days per week: None     Minutes per session: None     Stress: None   Relationships     Social connections     Talks on phone: None     Gets together: None     Attends Islam service: None     Active member of club or organization: None     Attends meetings of clubs or organizations: None     Relationship status: None     Intimate partner violence     Fear of current or ex partner: None     Emotionally abused: None     Physically abused: None     Forced sexual activity: None   Other Topics Concern     Parent/sibling w/ CABG, MI or angioplasty before 65F 55M? Not Asked   Social History  Narrative    Single.    Living in independent living portion of [employer].    On disability.    No regular exercise.      Family History  Family History   Problem Relation Age of Onset     Diabetes Type 2  Maternal Grandmother      Diabetes Type 2  Paternal Grandmother      Breast Cancer Paternal Grandmother      Cerebrovascular Disease Father 53     Myocardial Infarction No family hx of      Coronary Artery Disease Early Onset No family hx of      Ovarian Cancer No family hx of      Colon Cancer No family hx of       Past Surgical History  Past Surgical History:   Procedure Laterality Date     COMBINED ESOPHAGOSCOPY, GASTROSCOPY, DUODENOSCOPY (EGD), REPLACE ESOPHAGEAL STENT N/A 10/9/2019    Procedure: Upper Endoscopy with Suture Placement;  Surgeon: Zurdo Ramirez MD;  [facility]: [facility]     ESOPHAGOSCOPY, GASTROSCOPY, DUODENOSCOPY (EGD), COMBINED N/A 3/9/2017    Procedure: COMBINED ESOPHAGOSCOPY, GASTROSCOPY, DUODENOSCOPY (EGD), REMOVE FOREIGN BODY;  Surgeon: Avis Guzmán MD;  [facility]     ESOPHAGOSCOPY, GASTROSCOPY, DUODENOSCOPY (EGD), COMBINED N/A 4/20/2017    Procedure: COMBINED ESOPHAGOSCOPY, GASTROSCOPY, DUODENOSCOPY (EGD), REMOVE FOREIGN BODY;  EGD removal Foregin body;  Surgeon: Lokesh Paula MD;  [facility]     ESOPHAGOSCOPY, GASTROSCOPY, DUODENOSCOPY (EGD), COMBINED N/A 6/12/2017    Procedure: COMBINED ESOPHAGOSCOPY, GASTROSCOPY, DUODENOSCOPY (EGD);  COMBINED ESOPHAGOSCOPY, GASTROSCOPY, DUODENOSCOPY (EGD) [[id number]]attempted removal of foreign body;  Surgeon: Pamela Perez MD;  [facility]     ESOPHAGOSCOPY, GASTROSCOPY, DUODENOSCOPY (EGD), COMBINED N/A 6/9/2017    Procedure: COMBINED ESOPHAGOSCOPY, GASTROSCOPY, DUODENOSCOPY (EGD), REMOVE FOREIGN BODY;  Esophagoscopy, Gastroscopy, Duodenoscopy, Removal of Foreign Body;  Surgeon: Dejon Marsh MD;  [facility]     ESOPHAGOSCOPY, GASTROSCOPY, DUODENOSCOPY (EGD), COMBINED N/A  1/6/2018    Procedure: COMBINED ESOPHAGOSCOPY, GASTROSCOPY, DUODENOSCOPY (EGD), REMOVE FOREIGN BODY;  COMBINED ESOPHAGOSCOPY, GASTROSCOPY, DUODENOSCOPY (EGD) [by pascal net and snare with profol sedation;  Surgeon: Feliciano Emmanuel MD;  Location:  GI     ESOPHAGOSCOPY, GASTROSCOPY, DUODENOSCOPY (EGD), COMBINED N/A 3/19/2018    Procedure: COMBINED ESOPHAGOSCOPY, GASTROSCOPY, DUODENOSCOPY (EGD), REMOVE FOREIGN BODY;   Esophagodscopy, Gastroscopy, Duodenoscopy,Foreign Body Removal;  Surgeon: Brice Guzmán MD;  [facility]     ESOPHAGOSCOPY, GASTROSCOPY, DUODENOSCOPY (EGD), COMBINED N/A 4/16/2018    Procedure: COMBINED ESOPHAGOSCOPY, GASTROSCOPY, DUODENOSCOPY (EGD), REMOVE FOREIGN BODY;  Esophagogastroduodenoscopy  Foreign Body Removal X 2;  Surgeon: Royer Moise MD;  [facility]     ESOPHAGOSCOPY, GASTROSCOPY, DUODENOSCOPY (EGD), COMBINED N/A 6/1/2018    Procedure: COMBINED ESOPHAGOSCOPY, GASTROSCOPY, DUODENOSCOPY (EGD), REMOVE FOREIGN BODY;  COMBINED ESOPHAGOSCOPY, GASTROSCOPY, DUODENOSCOPY with removal of foreign body, propofol sedation by anesthesia;  Surgeon: Brice Martinez MD;  Location:  GI     ESOPHAGOSCOPY, GASTROSCOPY, DUODENOSCOPY (EGD), COMBINED N/A 7/25/2018    Procedure: COMBINED ESOPHAGOSCOPY, GASTROSCOPY, DUODENOSCOPY (EGD), REMOVE FOREIGN BODY;;  Surgeon: Candy Castelan MD;  Location:  GI     ESOPHAGOSCOPY, GASTROSCOPY, DUODENOSCOPY (EGD), COMBINED N/A 7/28/2018    Procedure: COMBINED ESOPHAGOSCOPY, GASTROSCOPY, DUODENOSCOPY (EGD), REMOVE FOREIGN BODY;  COMBINED ESOPHAGOSCOPY, GASTROSCOPY, DUODENOSCOPY (EGD), REMOVE FOREIGN BODY;  Surgeon: Brice Guzmán MD;  [facility]     ESOPHAGOSCOPY, GASTROSCOPY, DUODENOSCOPY (EGD), COMBINED N/A 7/31/2018    Procedure: COMBINED ESOPHAGOSCOPY, GASTROSCOPY, DUODENOSCOPY (EGD);  COMBINED ESOPHAGOSCOPY, GASTROSCOPY, DUODENOSCOPY (EGD) TO REMOVE FOREIGN BODY;  Surgeon: Lokesh Paula MD;  Location:   OR     ESOPHAGOSCOPY, GASTROSCOPY, DUODENOSCOPY (EGD), COMBINED N/A 8/4/2018    Procedure: COMBINED ESOPHAGOSCOPY, GASTROSCOPY, DUODENOSCOPY (EGD), REMOVE FOREIGN BODY;   combined esophagoscopy, gastroscopy, duodenoscopy, REMOVE FOREIGN BODY ;  Surgeon: Lokesh Paula MD;  [facility]     ESOPHAGOSCOPY, GASTROSCOPY, DUODENOSCOPY (EGD), COMBINED N/A 10/6/2019    Procedure: ESOPHAGOGASTRODUODENOSCOPY (EGD) with fireign body removal x2, esophageal stent placement with floroscopy;  Surgeon: Timoteo Espana MD;  [facility]     ESOPHAGOSCOPY, GASTROSCOPY, DUODENOSCOPY (EGD), COMBINED N/A 12/2/2019    Procedure: Esophagogastroduodenoscopy with esophageal stent removal, esophogram;  Surgeon: Kailee Tena MD;  [facility]     ESOPHAGOSCOPY, GASTROSCOPY, DUODENOSCOPY (EGD), COMBINED N/A 12/17/2019    Procedure: ESOPHAGOGASTRODUODENOSCOPY, WITH FOREIGN BODY REMOVAL;  Surgeon: Pamela Perez MD;  [facility]     ESOPHAGOSCOPY, GASTROSCOPY, DUODENOSCOPY (EGD), COMBINED N/A 12/13/2019    Procedure: ESOPHAGOGASTRODUODENOSCOPY, WITH FOREIGN BODY REMOVAL;  Surgeon: Samia Stanton MD;  [facility]     ESOPHAGOSCOPY, GASTROSCOPY, DUODENOSCOPY (EGD), COMBINED N/A 12/28/2019    Procedure: ESOPHAGOGASTRODUODENOSCOPY (EGD) Removal of Foreign Body X 2;  Surgeon: Huy Kelley MD;  [facility]     ESOPHAGOSCOPY, GASTROSCOPY, DUODENOSCOPY (EGD), COMBINED N/A 1/5/2020    Procedure: ESOPHAGOGASTRODUOENOSCOPY WITH FOREIGN BODY REMOVAL;  Surgeon: Pamela Perez MD;  [facility]     ESOPHAGOSCOPY, GASTROSCOPY, DUODENOSCOPY (EGD), COMBINED N/A 1/3/2020    Procedure: ESOPHAGOGASTRODUODENOSCOPY (EGD) REMOVAL OF FOREIGN BODY.;  Surgeon: Pmaela Perez MD;  [facility]     ESOPHAGOSCOPY, GASTROSCOPY, DUODENOSCOPY (EGD), COMBINED N/A 1/13/2020    Procedure: ESOPHAGOGASTRODUODENOSCOPY (EGD) for foreign body removal;  Surgeon: Lokesh Paula,  MD;  [facility]     ESOPHAGOSCOPY, GASTROSCOPY, DUODENOSCOPY (EGD), COMBINED N/A 1/18/2020    Procedure: Diagnostic ESOPHAGOGASTRODUODENOSCOPY (EGD;  Surgeon: Lokesh Paula MD;  [facility]     ESOPHAGOSCOPY, GASTROSCOPY, DUODENOSCOPY (EGD), COMBINED N/A 3/29/2020    Procedure: UPPER ENDOSCOPY WITH FOREIGN BODY REMOVAL;  Surgeon: Doug Hansen MD;  [facility]     ESOPHAGOSCOPY, GASTROSCOPY, DUODENOSCOPY (EGD), COMBINED N/A 7/11/2020    Procedure: ESOPHAGOGASTRODUODENOSCOPY (EGD); Upper Endoscopy WITH FOREIGN BODY REMOVAL;  Surgeon: Lyndsey Mendoza DO;  [facility]     ESOPHAGOSCOPY, GASTROSCOPY, DUODENOSCOPY (EGD), COMBINED N/A 7/29/2020    Procedure: ESOPHAGOGASTRODUODENOSCOPY REMOVAL OF FOREIGN BODY;  Surgeon: Samia Stanton MD;  [facility]     ESOPHAGOSCOPY, GASTROSCOPY, DUODENOSCOPY (EGD), COMBINED N/A 8/1/2020    Procedure: ESOPHAGOGASTRODUODENOSCOPY, WITH FOREIGN BODY REMOVAL;  Surgeon: Pamela Perez MD;  [facility]     EXAM UNDER ANESTHESIA ANUS N/A 1/10/2017    Procedure: EXAM UNDER ANESTHESIA ANUS;  Surgeon: Annmarie Haynes MD;  [facility]     EXAM UNDER ANESTHESIA RECTUM N/A 7/19/2018    Procedure: EXAM UNDER ANESTHESIA RECTUM;  EXAM UNDER ANESTHESIA, REMOVAL OF RECTAL FOREIGN BODY;  Surgeon: Annmarie Haynes MD;  [facility]     HC REMOVE FECAL IMPACTION OR FB W ANESTHESIA N/A 12/18/2016    Procedure: REMOVE FECAL IMPACTION/FOREIGN BODY UNDER ANESTHESIA;  Surgeon: Soham Cano MD;  Location: [facility] OR     KNEE SURGERY - removed a small tissue mass from knee Right      LAPAROSCOPIC ABLATION ENDOMETRIOSIS       LAPAROTOMY EXPLORATORY N/A 1/10/2017    Procedure: LAPAROTOMY EXPLORATORY;  Surgeon: Annmarie Haynes MD;  [facility]     LAPAROTOMY EXPLORATORY  09/11/2019    Manual manipulation of bowel to remove pill bottle in rectum     lymph nodes removed from neck; benign  age 7     MAMMOPLASTY REDUCTION  Bilateral      RELEASE CARPAL TUNNEL Bilateral      SIGMOIDOSCOPY FLEXIBLE N/A 1/10/2017    Procedure: SIGMOIDOSCOPY FLEXIBLE;  Surgeon: Annmarie Haynes MD;  [facility]     SIGMOIDOSCOPY FLEXIBLE N/A 5/8/2018    Procedure: SIGMOIDOSCOPY FLEXIBLE;  flex sig with foreign body removal using snare and rattooth forcep;  Surgeon: Soham Cano MD;  Location: [facility]     SIGMOIDOSCOPY FLEXIBLE N/A 2/20/2019    Procedure: Exam under anesthesia Colonoscopy with attempted  removal of rectal foreign body;  Surgeon: Estrada Chávez MD;  [facility]      Allergies  Allergies   Allergen Reactions     Amoxicillin-Pot Clavulanate Other (See Comments), Swelling and Rash     PN: facial swelling, left side. Also had cortisone injection the same day as she started the Augmentin.  Noted in downtime recovery of chart.       Penicillins Anaphylaxis     Vancomycin Itching, Swelling and Rash     Other reaction(s): Redness  Flushed face, very itchy; HUT Comment: Flushed face, very itchy; HUT Reaction: Angioedema; HUT Reaction: Redness; HUT Severity: Med; HUT Noted: [id number]       Hydrocodone Nausea and Vomiting and GI Disturbance     vomiting for days, PN: vomiting for days; HUT Comment: vomiting for days; HUT Reaction: Gastrointestinal; HUT Reaction: Nausea And Vomiting; HUT Reaction Type: Intolerance; HUT Severity: Med; HUT Noted: [id number]  vomiting for days       Blood-Group Specific Substance Other (See Comments)     Patient has an anti-Cw and non-specific antibodies. Blood product orders may be delayed. Draw one red top and two purple top tubes for all type/screen/crossmatch orders.     Influenza Vaccines Other (See Comments)     Oseltamivir Hives     med stopped, PN: med stopped     Cephalosporins Rash     Lamotrigine Rash     Possibly associated with Lamictal.   HUT Comment: Possibly associated with Lamictal. ; HUT Reaction: Rash; HUT Reaction Type: Allergy; HUT Severity: Low; HUT Noted: [id number]     Latex Rash        _____________________________________________________________________    Physical Exam  /70   Pulse 84   Temp 98.3  F (36.8  C) (Oral)   Resp 16   SpO2 96%   0 lbs 0 oz    Constitutional: awake, alert, cooperative, no acute distress  Eyes: anicteric, EOMI  ENT: oropharynx clear, MMM  Respiratory: breathing comfortably on room air, clear to auscultation bilaterally, no crackles or wheezing  Cardiovascular: regular rate and rhythm, normal S1 and S2, no murmur noted  GI: soft, obese, non-tender, BS+  Skin: normal skin color, texture, turgor  Musculoskeletal: no lower extremity edema present  Neurologic: alert and oriented x3, CNs II-XII grossly intact, moving all extremities equally  Neuropsychiatric: flat affect, mildly agitated      Labs and Imaging  All labs and imaging reviewed. Pertinent below.    BMP and CBC wnl    Abdominal XR: Linear radiopaque foreign object projecting over the expected location  of the stomach, not significantly changed from 8/1/2020.

## 2020-08-21 NOTE — ANESTHESIA POSTPROCEDURE EVALUATION
Anesthesia POST Procedure Evaluation    Patient: Nevin Alvarado   MRN:     [id number] Gender:   female   Age:    29 year old :      1990        Preoperative Diagnosis: Foreign body, swallowed [T18.9XXA]   Procedure(s):  ESOPHAGOGASTRODUODENOSCOPY (EGD) for foreign body removal   Postop Comments: No value filed.     Anesthesia Type: General       Disposition: Admission   Postop Pain Control: Uneventful            Sign Out: Well controlled pain   PONV: No   Neuro/Psych: Uneventful            Sign Out: Acceptable/Baseline neuro status   Airway/Respiratory: Uneventful            Sign Out: Acceptable/Baseline resp. status   CV/Hemodynamics: Uneventful            Sign Out: Acceptable CV status   Other NRE: NONE   DID A NON-ROUTINE EVENT OCCUR? No         Last Anesthesia Record Vitals:  CRNA VITALS  2020 0010 - 2020 0110      2020             Pulse:  96    Ht Rate:  94    SpO2:  100 %          Last PACU Vitals:  Vitals Value Taken Time   /72 2020  7:25 AM   Temp 36.7  C (98  F) 2020  7:25 AM   Pulse 82 2020  7:25 AM   Resp 16 2020  7:25 AM   SpO2 98 % 2020  7:25 AM   Temp src     NIBP     Pulse     SpO2     Resp     Temp     Ht Rate     Temp 2           Electronically Signed By: Christopher J. Behrens, MD, 2020, 11:04 AM

## 2020-08-27 ENCOUNTER — HOSPITAL ENCOUNTER (EMERGENCY)
Facility: CLINIC | Age: 30
Discharge: HOME | End: 2020-08-28
Attending: EMERGENCY MEDICINE | Admitting: EMERGENCY MEDICINE
Payer: COMMERCIAL

## 2020-08-27 ENCOUNTER — ANESTHESIA EVENT (OUTPATIENT)
Dept: SURGERY | Facility: CLINIC | Age: 30
End: 2020-08-27
Payer: COMMERCIAL

## 2020-08-27 ENCOUNTER — APPOINTMENT (OUTPATIENT)
Dept: GENERAL RADIOLOGY | Facility: CLINIC | Age: 30
End: 2020-08-27
Attending: EMERGENCY MEDICINE
Payer: COMMERCIAL

## 2020-08-27 ENCOUNTER — ANESTHESIA (OUTPATIENT)
Dept: SURGERY | Facility: CLINIC | Age: 30
End: 2020-08-27
Payer: COMMERCIAL

## 2020-08-27 DIAGNOSIS — T18.9XXA SWALLOWED FOREIGN BODY, INITIAL ENCOUNTER: Primary | ICD-10-CM

## 2020-08-27 DIAGNOSIS — T18.2XXA FOREIGN BODY IN STOMACH, INITIAL ENCOUNTER: ICD-10-CM

## 2020-08-27 LAB
ANION GAP SERPL CALCULATED.3IONS-SCNC: 5 MMOL/L (ref 3–14)
BASOPHILS # BLD AUTO: 0 10E9/L (ref 0–0.2)
BASOPHILS NFR BLD AUTO: 0.3 %
BUN SERPL-MCNC: 11 MG/DL (ref 7–30)
CALCIUM SERPL-MCNC: 9 MG/DL (ref 8.5–10.1)
CHLORIDE SERPL-SCNC: 106 MMOL/L (ref 94–109)
CO2 SERPL-SCNC: 28 MMOL/L (ref 20–32)
CREAT SERPL-MCNC: 0.61 MG/DL (ref 0.52–1.04)
DIFFERENTIAL METHOD BLD: NORMAL
EOSINOPHIL # BLD AUTO: 0.2 10E9/L (ref 0–0.7)
EOSINOPHIL NFR BLD AUTO: 1.8 %
ERYTHROCYTE [DISTWIDTH] IN BLOOD BY AUTOMATED COUNT: 14.8 % (ref 10–15)
GFR SERPL CREATININE-BSD FRML MDRD: >90 ML/MIN/{1.73_M2}
GLUCOSE SERPL-MCNC: 98 MG/DL (ref 70–99)
HCT VFR BLD AUTO: 37.5 % (ref 35–47)
HGB BLD-MCNC: 11.9 G/DL (ref 11.7–15.7)
IMM GRANULOCYTES # BLD: 0 10E9/L (ref 0–0.4)
IMM GRANULOCYTES NFR BLD: 0.2 %
LABORATORY COMMENT REPORT: NORMAL
LYMPHOCYTES # BLD AUTO: 2.1 10E9/L (ref 0.8–5.3)
LYMPHOCYTES NFR BLD AUTO: 24.2 %
MCH RBC QN AUTO: 28.2 PG (ref 26.5–33)
MCHC RBC AUTO-ENTMCNC: 31.7 G/DL (ref 31.5–36.5)
MCV RBC AUTO: 89 FL (ref 78–100)
MONOCYTES # BLD AUTO: 0.7 10E9/L (ref 0–1.3)
MONOCYTES NFR BLD AUTO: 8 %
NEUTROPHILS # BLD AUTO: 5.7 10E9/L (ref 1.6–8.3)
NEUTROPHILS NFR BLD AUTO: 65.5 %
NRBC # BLD AUTO: 0 10*3/UL
NRBC BLD AUTO-RTO: 0 /100
PLATELET # BLD AUTO: 269 10E9/L (ref 150–450)
POTASSIUM SERPL-SCNC: 3.8 MMOL/L (ref 3.4–5.3)
RBC # BLD AUTO: 4.22 10E12/L (ref 3.8–5.2)
SARS-COV-2 RNA SPEC QL NAA+PROBE: NEGATIVE
SARS-COV-2 RNA SPEC QL NAA+PROBE: NORMAL
SODIUM SERPL-SCNC: 139 MMOL/L (ref 133–144)
SPECIMEN SOURCE: NORMAL
SPECIMEN SOURCE: NORMAL
UPPER GI ENDOSCOPY: NORMAL
WBC # BLD AUTO: 8.7 10E9/L (ref 4–11)

## 2020-08-27 PROCEDURE — 37000009 ZZH ANESTHESIA TECHNICAL FEE, EACH ADDTL 15 MIN: Performed by: INTERNAL MEDICINE

## 2020-08-27 PROCEDURE — 80048 BASIC METABOLIC PNL TOTAL CA: CPT | Performed by: EMERGENCY MEDICINE

## 2020-08-27 PROCEDURE — 25000125 ZZHC RX 250: Performed by: NURSE ANESTHETIST, CERTIFIED REGISTERED

## 2020-08-27 PROCEDURE — 25000125 ZZHC RX 250: Performed by: INTERNAL MEDICINE

## 2020-08-27 PROCEDURE — 71000015 ZZH RECOVERY PHASE 1 LEVEL 2 EA ADDTL HR: Performed by: INTERNAL MEDICINE

## 2020-08-27 PROCEDURE — U0003 INFECTIOUS AGENT DETECTION BY NUCLEIC ACID (DNA OR RNA); SEVERE ACUTE RESPIRATORY SYNDROME CORONAVIRUS 2 (SARS-COV-2) (CORONAVIRUS DISEASE [COVID-19]), AMPLIFIED PROBE TECHNIQUE, MAKING USE OF HIGH THROUGHPUT TECHNOLOGIES AS DESCRIBED BY CMS-2020-01-R: HCPCS | Performed by: EMERGENCY MEDICINE

## 2020-08-27 PROCEDURE — 71000014 ZZH RECOVERY PHASE 1 LEVEL 2 FIRST HR: Performed by: INTERNAL MEDICINE

## 2020-08-27 PROCEDURE — C9803 HOPD COVID-19 SPEC COLLECT: HCPCS | Performed by: EMERGENCY MEDICINE

## 2020-08-27 PROCEDURE — 85025 COMPLETE CBC W/AUTO DIFF WBC: CPT | Performed by: EMERGENCY MEDICINE

## 2020-08-27 PROCEDURE — 25000565 ZZH ISOFLURANE, EA 15 MIN: Performed by: INTERNAL MEDICINE

## 2020-08-27 PROCEDURE — 25000128 H RX IP 250 OP 636: Performed by: NURSE ANESTHETIST, CERTIFIED REGISTERED

## 2020-08-27 PROCEDURE — 37000008 ZZH ANESTHESIA TECHNICAL FEE, 1ST 30 MIN: Performed by: INTERNAL MEDICINE

## 2020-08-27 PROCEDURE — 25000128 H RX IP 250 OP 636: Performed by: ANESTHESIOLOGY

## 2020-08-27 PROCEDURE — 99285 EMERGENCY DEPT VISIT HI MDM: CPT | Mod: 25 | Performed by: EMERGENCY MEDICINE

## 2020-08-27 PROCEDURE — 36000053 ZZH SURGERY LEVEL 2 EA 15 ADDTL MIN - UMMC: Performed by: INTERNAL MEDICINE

## 2020-08-27 PROCEDURE — 27210794 ZZH OR GENERAL SUPPLY STERILE: Performed by: INTERNAL MEDICINE

## 2020-08-27 PROCEDURE — 99283 EMERGENCY DEPT VISIT LOW MDM: CPT | Mod: Z6 | Performed by: EMERGENCY MEDICINE

## 2020-08-27 PROCEDURE — 36000051 ZZH SURGERY LEVEL 2 1ST 30 MIN - UMMC: Performed by: INTERNAL MEDICINE

## 2020-08-27 PROCEDURE — 25800030 ZZH RX IP 258 OP 636: Performed by: NURSE ANESTHETIST, CERTIFIED REGISTERED

## 2020-08-27 PROCEDURE — 74018 RADEX ABDOMEN 1 VIEW: CPT

## 2020-08-27 RX ORDER — LIDOCAINE HYDROCHLORIDE 20 MG/ML
INJECTION, SOLUTION INFILTRATION; PERINEURAL PRN
Status: DISCONTINUED | OUTPATIENT
Start: 2020-08-27 | End: 2020-08-27

## 2020-08-27 RX ORDER — ONDANSETRON 4 MG/1
4 TABLET, ORALLY DISINTEGRATING ORAL EVERY 30 MIN PRN
Status: DISCONTINUED | OUTPATIENT
Start: 2020-08-27 | End: 2020-08-28 | Stop reason: HOSPADM

## 2020-08-27 RX ORDER — FENTANYL CITRATE 50 UG/ML
25-50 INJECTION, SOLUTION INTRAMUSCULAR; INTRAVENOUS
Status: DISCONTINUED | OUTPATIENT
Start: 2020-08-27 | End: 2020-08-28 | Stop reason: HOSPADM

## 2020-08-27 RX ORDER — DEXAMETHASONE SODIUM PHOSPHATE 4 MG/ML
INJECTION, SOLUTION INTRA-ARTICULAR; INTRALESIONAL; INTRAMUSCULAR; INTRAVENOUS; SOFT TISSUE PRN
Status: DISCONTINUED | OUTPATIENT
Start: 2020-08-27 | End: 2020-08-27

## 2020-08-27 RX ORDER — ONDANSETRON 2 MG/ML
INJECTION INTRAMUSCULAR; INTRAVENOUS PRN
Status: DISCONTINUED | OUTPATIENT
Start: 2020-08-27 | End: 2020-08-27

## 2020-08-27 RX ORDER — FENTANYL CITRATE 50 UG/ML
INJECTION, SOLUTION INTRAMUSCULAR; INTRAVENOUS PRN
Status: DISCONTINUED | OUTPATIENT
Start: 2020-08-27 | End: 2020-08-27

## 2020-08-27 RX ORDER — DIPHENHYDRAMINE HYDROCHLORIDE 50 MG/ML
12.5 INJECTION INTRAMUSCULAR; INTRAVENOUS ONCE
Status: DISCONTINUED | OUTPATIENT
Start: 2020-08-28 | End: 2020-08-27

## 2020-08-27 RX ORDER — SODIUM CHLORIDE, SODIUM LACTATE, POTASSIUM CHLORIDE, CALCIUM CHLORIDE 600; 310; 30; 20 MG/100ML; MG/100ML; MG/100ML; MG/100ML
INJECTION, SOLUTION INTRAVENOUS CONTINUOUS
Status: DISCONTINUED | OUTPATIENT
Start: 2020-08-27 | End: 2020-08-28 | Stop reason: HOSPADM

## 2020-08-27 RX ORDER — SODIUM CHLORIDE, SODIUM LACTATE, POTASSIUM CHLORIDE, CALCIUM CHLORIDE 600; 310; 30; 20 MG/100ML; MG/100ML; MG/100ML; MG/100ML
INJECTION, SOLUTION INTRAVENOUS CONTINUOUS PRN
Status: DISCONTINUED | OUTPATIENT
Start: 2020-08-27 | End: 2020-08-27

## 2020-08-27 RX ORDER — LIDOCAINE 40 MG/G
CREAM TOPICAL
Status: DISCONTINUED | OUTPATIENT
Start: 2020-08-27 | End: 2020-08-27 | Stop reason: HOSPADM

## 2020-08-27 RX ORDER — NALOXONE HYDROCHLORIDE 0.4 MG/ML
.1-.4 INJECTION, SOLUTION INTRAMUSCULAR; INTRAVENOUS; SUBCUTANEOUS
Status: DISCONTINUED | OUTPATIENT
Start: 2020-08-27 | End: 2020-08-28 | Stop reason: HOSPADM

## 2020-08-27 RX ORDER — PROPOFOL 10 MG/ML
INJECTION, EMULSION INTRAVENOUS PRN
Status: DISCONTINUED | OUTPATIENT
Start: 2020-08-27 | End: 2020-08-27

## 2020-08-27 RX ORDER — HYDROMORPHONE HYDROCHLORIDE 1 MG/ML
.3-.5 INJECTION, SOLUTION INTRAMUSCULAR; INTRAVENOUS; SUBCUTANEOUS EVERY 5 MIN PRN
Status: DISCONTINUED | OUTPATIENT
Start: 2020-08-27 | End: 2020-08-28 | Stop reason: HOSPADM

## 2020-08-27 RX ORDER — ONDANSETRON 2 MG/ML
4 INJECTION INTRAMUSCULAR; INTRAVENOUS EVERY 30 MIN PRN
Status: DISCONTINUED | OUTPATIENT
Start: 2020-08-27 | End: 2020-08-28 | Stop reason: HOSPADM

## 2020-08-27 RX ADMIN — ROCURONIUM BROMIDE 5 MG: 10 INJECTION INTRAVENOUS at 22:20

## 2020-08-27 RX ADMIN — MIDAZOLAM 1 MG: 1 INJECTION INTRAMUSCULAR; INTRAVENOUS at 22:20

## 2020-08-27 RX ADMIN — ONDANSETRON 4 MG: 2 INJECTION INTRAMUSCULAR; INTRAVENOUS at 23:08

## 2020-08-27 RX ADMIN — DIPHENHYDRAMINE HYDROCHLORIDE 12.5 MG: 50 INJECTION, SOLUTION INTRAMUSCULAR; INTRAVENOUS at 23:21

## 2020-08-27 RX ADMIN — Medication 100 MG: at 22:20

## 2020-08-27 RX ADMIN — ROCURONIUM BROMIDE 20 MG: 10 INJECTION INTRAVENOUS at 22:23

## 2020-08-27 RX ADMIN — MIDAZOLAM 1 MG: 1 INJECTION INTRAMUSCULAR; INTRAVENOUS at 22:12

## 2020-08-27 RX ADMIN — HYDROMORPHONE HYDROCHLORIDE 0.3 MG: 1 INJECTION, SOLUTION INTRAMUSCULAR; INTRAVENOUS; SUBCUTANEOUS at 23:48

## 2020-08-27 RX ADMIN — SUGAMMADEX 200 MG: 100 INJECTION, SOLUTION INTRAVENOUS at 22:38

## 2020-08-27 RX ADMIN — FENTANYL CITRATE 50 MCG: 50 INJECTION, SOLUTION INTRAMUSCULAR; INTRAVENOUS at 22:20

## 2020-08-27 RX ADMIN — ONDANSETRON 4 MG: 2 INJECTION INTRAMUSCULAR; INTRAVENOUS at 22:20

## 2020-08-27 RX ADMIN — LIDOCAINE HYDROCHLORIDE 50 MG: 20 INJECTION, SOLUTION INFILTRATION; PERINEURAL at 22:20

## 2020-08-27 RX ADMIN — DEXAMETHASONE SODIUM PHOSPHATE 8 MG: 4 INJECTION, SOLUTION INTRA-ARTICULAR; INTRALESIONAL; INTRAMUSCULAR; INTRAVENOUS; SOFT TISSUE at 22:20

## 2020-08-27 RX ADMIN — SODIUM CHLORIDE, POTASSIUM CHLORIDE, SODIUM LACTATE AND CALCIUM CHLORIDE: 600; 310; 30; 20 INJECTION, SOLUTION INTRAVENOUS at 22:12

## 2020-08-27 RX ADMIN — PROPOFOL 200 MG: 10 INJECTION, EMULSION INTRAVENOUS at 22:20

## 2020-08-27 RX ADMIN — FENTANYL CITRATE 50 MCG: 50 INJECTION, SOLUTION INTRAMUSCULAR; INTRAVENOUS at 22:56

## 2020-08-27 RX ADMIN — FENTANYL CITRATE 50 MCG: 50 INJECTION, SOLUTION INTRAMUSCULAR; INTRAVENOUS at 23:07

## 2020-08-27 RX ADMIN — FENTANYL CITRATE 50 MCG: 50 INJECTION, SOLUTION INTRAMUSCULAR; INTRAVENOUS at 22:31

## 2020-08-27 NOTE — ED PROVIDER NOTES
[place] EMERGENC[facility] DE[facility]MENT ([facility])  8/27/20    History     Chief Complaint   Patient presents with     Swallowed Foreign Body     HPI  Nevin Alvarado is a 29 year old female with a past medical history of bipolar disorder, ADD, anorexia, PTSD, and frequent admissions for foreign body ingestion who presents to the Emergency Department after swallowing a few inches of metal notebook coil.  At this time she denies any chest, abdominal or throat pain.    Per chart review, the patient was hospitalized from 8/18 - 8/19 after swallowing a spiral notebook coil. She underwent EGD with retrieval of the object on 8/18. At the time of discharge she was tolerating regular diet, denied suicidal ideation, and the urge to harm herself or swallow anything.     I have reviewed the Medications, Allergies, Past Medical and Surgical History, and Social History in the [employer] system.  PAST MEDICAL HISTORY:   Past Medical History:   Diagnosis Date     ADD (attention deficit disorder)      Anorexia nervosa with bulimia     history of; on Topamax     Anxiety      Borderline personality disorder (H)      Depression      H/O self-harm      History of pulmonary embolism 12/2019    Provoked. Completed 3 month course of Apixaban     Morbid obesity (H)      Neuropathy      PTSD (post-traumatic stress disorder)      Rectal foreign body - Recurrent issue, self placed      Swallowed foreign body - Recurrent issue, self placed        PAST SURGICAL HISTORY:   Past Surgical History:   Procedure Laterality Date     COMBINED ESOPHAGOSCOPY, GASTROSCOPY, DUODENOSCOPY (EGD), REPLACE ESOPHAGEAL STENT N/A 10/9/2019    Procedure: Upper Endoscopy with Suture Placement;  Surgeon: Zurdo Ramirez MD;  Location:  OR     ESOPHAGOSCOPY, GASTROSCOPY, DUODENOSCOPY (EGD), COMBINED N/A 3/9/2017    Procedure: COMBINED ESOPHAGOSCOPY, GASTROSCOPY, DUODENOSCOPY (EGD), REMOVE FOREIGN BODY;  Surgeon: Avis Guzmán MD;  [facility]:  [facility] OR     ESOPHAGOSCOPY, GASTROSCOPY, DUODENOSCOPY (EGD), COMBINED N/A 4/20/2017    Procedure: COMBINED ESOPHAGOSCOPY, GASTROSCOPY, DUODENOSCOPY (EGD), REMOVE FOREIGN BODY;  EGD removal Foregin body;  Surgeon: Lokesh Paula MD;  [facility]: [facility] OR     ESOPHAGOSCOPY, GASTROSCOPY, DUODENOSCOPY (EGD), COMBINED N/A 6/12/2017    Procedure: COMBINED ESOPHAGOSCOPY, GASTROSCOPY, DUODENOSCOPY (EGD);  COMBINED ESOPHAGOSCOPY, GASTROSCOPY, DUODENOSCOPY (EGD) [[id number]]attempted removal of foreign body;  Surgeon: Pamela Perez MD;  [facility]     ESOPHAGOSCOPY, GASTROSCOPY, DUODENOSCOPY (EGD), COMBINED N/A 6/9/2017    Procedure: COMBINED ESOPHAGOSCOPY, GASTROSCOPY, DUODENOSCOPY (EGD), REMOVE FOREIGN BODY;  Esophagoscopy, Gastroscopy, Duodenoscopy, Removal of Foreign Body;  Surgeon: Dejon Marsh MD;  [facility]     ESOPHAGOSCOPY, GASTROSCOPY, DUODENOSCOPY (EGD), COMBINED N/A 1/6/2018    Procedure: COMBINED ESOPHAGOSCOPY, GASTROSCOPY, DUODENOSCOPY (EGD), REMOVE FOREIGN BODY;  COMBINED ESOPHAGOSCOPY, GASTROSCOPY, DUODENOSCOPY (EGD) [by pascal net and snare with profol sedation;  Surgeon: Feliciano Emmanuel MD;  Location:  GI     ESOPHAGOSCOPY, GASTROSCOPY, DUODENOSCOPY (EGD), COMBINED N/A 3/19/2018    Procedure: COMBINED ESOPHAGOSCOPY, GASTROSCOPY, DUODENOSCOPY (EGD), REMOVE FOREIGN BODY;   Esophagodscopy, Gastroscopy, Duodenoscopy,Foreign Body Removal;  Surgeon: Brice Guzmán MD;  [facility]     ESOPHAGOSCOPY, GASTROSCOPY, DUODENOSCOPY (EGD), COMBINED N/A 4/16/2018    Procedure: COMBINED ESOPHAGOSCOPY, GASTROSCOPY, DUODENOSCOPY (EGD), REMOVE FOREIGN BODY;  Esophagogastroduodenoscopy  Foreign Body Removal X 2;  Surgeon: Royer Moise MD;  [facility]     ESOPHAGOSCOPY, GASTROSCOPY, DUODENOSCOPY (EGD), COMBINED N/A 6/1/2018    Procedure: COMBINED ESOPHAGOSCOPY, GASTROSCOPY, DUODENOSCOPY (EGD), REMOVE FOREIGN BODY;  COMBINED ESOPHAGOSCOPY, GASTROSCOPY,  DUODENOSCOPY with removal of foreign body, propofol sedation by anesthesia;  Surgeon: Brice Martinez MD;  Location:  GI     ESOPHAGOSCOPY, GASTROSCOPY, DUODENOSCOPY (EGD), COMBINED N/A 7/25/2018    Procedure: COMBINED ESOPHAGOSCOPY, GASTROSCOPY, DUODENOSCOPY (EGD), REMOVE FOREIGN BODY;;  Surgeon: Candy Castelan MD;  Location:  GI     ESOPHAGOSCOPY, GASTROSCOPY, DUODENOSCOPY (EGD), COMBINED N/A 7/28/2018    Procedure: COMBINED ESOPHAGOSCOPY, GASTROSCOPY, DUODENOSCOPY (EGD), REMOVE FOREIGN BODY;  COMBINED ESOPHAGOSCOPY, GASTROSCOPY, DUODENOSCOPY (EGD), REMOVE FOREIGN BODY;  Surgeon: Brice Guzmán MD;  [facility]     ESOPHAGOSCOPY, GASTROSCOPY, DUODENOSCOPY (EGD), COMBINED N/A 7/31/2018    Procedure: COMBINED ESOPHAGOSCOPY, GASTROSCOPY, DUODENOSCOPY (EGD);  COMBINED ESOPHAGOSCOPY, GASTROSCOPY, DUODENOSCOPY (EGD) TO REMOVE FOREIGN BODY;  Surgeon: Lokesh Paula MD;  [facility]     ESOPHAGOSCOPY, GASTROSCOPY, DUODENOSCOPY (EGD), COMBINED N/A 8/4/2018    Procedure: COMBINED ESOPHAGOSCOPY, GASTROSCOPY, DUODENOSCOPY (EGD), REMOVE FOREIGN BODY;   combined esophagoscopy, gastroscopy, duodenoscopy, REMOVE FOREIGN BODY ;  Surgeon: Lokesh Paula MD;  [facility]     ESOPHAGOSCOPY, GASTROSCOPY, DUODENOSCOPY (EGD), COMBINED N/A 10/6/2019    Procedure: ESOPHAGOGASTRODUODENOSCOPY (EGD) with fireign body removal x2, esophageal stent placement with floroscopy;  Surgeon: Timoteo Espana MD;  [facility]     ESOPHAGOSCOPY, GASTROSCOPY, DUODENOSCOPY (EGD), COMBINED N/A 12/2/2019    Procedure: Esophagogastroduodenoscopy with esophageal stent removal, esophogram;  Surgeon: Kailee Tena MD;  [facility]     ESOPHAGOSCOPY, GASTROSCOPY, DUODENOSCOPY (EGD), COMBINED N/A 12/17/2019    Procedure: ESOPHAGOGASTRODUODENOSCOPY, WITH FOREIGN BODY REMOVAL;  Surgeon: Pamela Perez MD;  [facility]     ESOPHAGOSCOPY, GASTROSCOPY, DUODENOSCOPY (EGD), COMBINED N/A  12/13/2019    Procedure: ESOPHAGOGASTRODUODENOSCOPY, WITH FOREIGN BODY REMOVAL;  Surgeon: Samia Stanton MD;  [facility]     ESOPHAGOSCOPY, GASTROSCOPY, DUODENOSCOPY (EGD), COMBINED N/A 12/28/2019    Procedure: ESOPHAGOGASTRODUODENOSCOPY (EGD) Removal of Foreign Body X 2;  Surgeon: Huy Kelley MD;  [facility]     ESOPHAGOSCOPY, GASTROSCOPY, DUODENOSCOPY (EGD), COMBINED N/A 1/5/2020    Procedure: ESOPHAGOGASTRODUOENOSCOPY WITH FOREIGN BODY REMOVAL;  Surgeon: Pamela Perez MD;  [facility]     ESOPHAGOSCOPY, GASTROSCOPY, DUODENOSCOPY (EGD), COMBINED N/A 1/3/2020    Procedure: ESOPHAGOGASTRODUODENOSCOPY (EGD) REMOVAL OF FOREIGN BODY.;  Surgeon: Pamela Perez MD;  [facility]     ESOPHAGOSCOPY, GASTROSCOPY, DUODENOSCOPY (EGD), COMBINED N/A 1/13/2020    Procedure: ESOPHAGOGASTRODUODENOSCOPY (EGD) for foreign body removal;  Surgeon: Lokesh Paula MD;  [facility]     ESOPHAGOSCOPY, GASTROSCOPY, DUODENOSCOPY (EGD), COMBINED N/A 1/18/2020    Procedure: Diagnostic ESOPHAGOGASTRODUODENOSCOPY (EGD;  Surgeon: Lokesh Paula MD;  [facility]     ESOPHAGOSCOPY, GASTROSCOPY, DUODENOSCOPY (EGD), COMBINED N/A 3/29/2020    Procedure: UPPER ENDOSCOPY WITH FOREIGN BODY REMOVAL;  Surgeon: Doug Hansen MD;  [facility]     ESOPHAGOSCOPY, GASTROSCOPY, DUODENOSCOPY (EGD), COMBINED N/A 7/11/2020    Procedure: ESOPHAGOGASTRODUODENOSCOPY (EGD); Upper Endoscopy WITH FOREIGN BODY REMOVAL;  Surgeon: Lyndsey Mendoza DO;  [facility]     ESOPHAGOSCOPY, GASTROSCOPY, DUODENOSCOPY (EGD), COMBINED N/A 7/29/2020    Procedure: ESOPHAGOGASTRODUODENOSCOPY REMOVAL OF FOREIGN BODY;  Surgeon: Samia Stanton MD;  [facility]     ESOPHAGOSCOPY, GASTROSCOPY, DUODENOSCOPY (EGD), COMBINED N/A 8/1/2020    Procedure: ESOPHAGOGASTRODUODENOSCOPY, WITH FOREIGN BODY REMOVAL;  Surgeon: Pamela Perez MD;  [facility]     ESOPHAGOSCOPY,  GASTROSCOPY, DUODENOSCOPY (EGD), COMBINED N/A 8/18/2020    Procedure: ESOPHAGOGASTRODUODENOSCOPY (EGD) for foreign body removal;  Surgeon: Pamela Perez MD;  [facility]     ESOPHAGOSCOPY, GASTROSCOPY, DUODENOSCOPY (EGD), COMBINED N/A 8/27/2020    Procedure: ESOPHAGOGASTRODUODENOSCOPY (EGD) with foreign body removal;  Surgeon: Campbell Rogers MD;  [facility]     EXAM UNDER ANESTHESIA ANUS N/A 1/10/2017    Procedure: EXAM UNDER ANESTHESIA ANUS;  Surgeon: Annmarie Haynes MD;  [facility]     EXAM UNDER ANESTHESIA RECTUM N/A 7/19/2018    Procedure: EXAM UNDER ANESTHESIA RECTUM;  EXAM UNDER ANESTHESIA, REMOVAL OF RECTAL FOREIGN BODY;  Surgeon: Annmarie Haynes MD;  [facility]     HC REMOVE FECAL IMPACTION OR FB W ANESTHESIA N/A 12/18/2016    Procedure: REMOVE FECAL IMPACTION/FOREIGN BODY UNDER ANESTHESIA;  Surgeon: Soham Cano MD;  Location:  OR     KNEE SURGERY - removed a small tissue mass from knee Right      LAPAROSCOPIC ABLATION ENDOMETRIOSIS       LAPAROTOMY EXPLORATORY N/A 1/10/2017    Procedure: LAPAROTOMY EXPLORATORY;  Surgeon: Annmarie Haynes MD;  [facility]     LAPAROTOMY EXPLORATORY  09/11/2019    Manual manipulation of bowel to remove pill bottle in rectum     lymph nodes removed from neck; benign  age 7     MAMMOPLASTY REDUCTION Bilateral      RELEASE CARPAL TUNNEL Bilateral      SIGMOIDOSCOPY FLEXIBLE N/A 1/10/2017    Procedure: SIGMOIDOSCOPY FLEXIBLE;  Surgeon: Annmarie Haynes MD;  [facility]     SIGMOIDOSCOPY FLEXIBLE N/A 5/8/2018    Procedure: SIGMOIDOSCOPY FLEXIBLE;  flex sig with foreign body removal using snare and rattooth forcep;  Surgeon: Soham Cano MD;  Location:  GI     SIGMOIDOSCOPY FLEXIBLE N/A 2/20/2019    Procedure: Exam under anesthesia Colonoscopy with attempted  removal of rectal foreign body;  Surgeon: Estrada Chávez MD;  [facility]       Past medical history, past  surgical history, medications, and allergies were reviewed with the patient. Additional pertinent items: None    FAMILY HISTORY:   Family History   Problem Relation Age of Onset     Diabetes Type 2  Maternal Grandmother      Diabetes Type 2  Paternal Grandmother      Breast Cancer Paternal Grandmother      Cerebrovascular Disease Father 53     Myocardial Infarction No family hx of      Coronary Artery Disease Early Onset No family hx of      Ovarian Cancer No family hx of      Colon Cancer No family hx of        SOCIAL HISTORY:   Social History     Tobacco Use     Smoking status: Never Smoker     Smokeless tobacco: Never Used   Substance Use Topics     Alcohol use: No     Alcohol/week: 0.0 standard drinks     Social history was reviewed with the patient. Additional pertinent items: None      Discharge Medication List as of 8/28/2020 12:31 AM      CONTINUE these medications which have NOT CHANGED    Details   acetaminophen (TYLENOL) 32 mg/mL liquid Take 31.25 mLs (1,000 mg) by mouth every 6 hours as needed for fever or pain, Disp-355 mL, R-0, E-Prescribe      albuterol (PROAIR HFA/PROVENTIL HFA/VENTOLIN HFA) 108 (90 Base) MCG/ACT inhaler Inhale 2 puffs into the lungs as needed for shortness of breath / dyspnea or wheezing, HistoricalPharmacy may dispense brand covered by insurance (Proair, or proventil or ventolin or generic albuterol inhaler)      busPIRone (BUSPAR) 15 MG tablet Take 1 tablet (15 mg) by mouth 2 times daily, No Print Out      Cholecalciferol (VITAMIN D) 50 MCG (2000 UT) CAPS Take 2,000 Units by mouth daily , Historical      cloNIDine (CATAPRES) 0.1 MG tablet Take 0.1 mg by mouth 2 times daily , Historical      desvenlafaxine (PRISTIQ) 100 MG 24 hr tablet Take 1 tablet (100 mg) by mouth every morning, Historical      docosanol (ABREVA) 10 % CREA cream Apply to lip 5 times a day as soon as symptoms begin, do not use for more than 10 days. Used PRN.Historical      hydroxychloroquine (PLAQUENIL) 200 MG  tablet Take 200 mg by mouth 2 times daily, Historical      hydrOXYzine (ATARAX) 50 MG tablet Take 1 tablet (50 mg) by mouth 3 times daily as needed for anxiety, Disp-30 tablet, R-0, E-Prescribe      lurasidone (LATUDA) 60 MG TABS tablet Take 1 tablet (60 mg) by mouth at bedtime, Historical      metFORMIN (GLUCOPHAGE-XR) 500 MG 24 hr tablet Take 1,000 mg by mouth Take 1 tablet (500 mg) by mouth daily , Historical      naltrexone (DEPADE/REVIA) 50 MG tablet Take 1 tablet (50 mg) by mouth every evening, No Print Out      norethindrone (MICRONOR) 0.35 MG tablet Take 0.35 mg by mouth daily, Historical      ondansetron (ZOFRAN-ODT) 4 MG ODT tab Take 4 mg by mouth every 6 hours as needed for nausea or vomiting , Historical      pantoprazole (PROTONIX) 40 MG EC tablet Take 1 tablet (40 mg) by mouth daily, Disp-30 tablet,R-1, E-PrescribeMay stop after 8 weeks      pregabalin (LYRICA) 50 MG capsule Take 50 mg by mouth 3 times daily, Historical      Prenatal Vit-Fe Fumarate-FA (PNV PRENATAL PLUS MULTIVITAMIN) 27-1 MG TABS per tablet Take 1 tablet by mouth daily, Historical      topiramate (TOPAMAX) 100 MG tablet Take 0.5 tablets (50 mg) by mouth daily Take 1 tablet (100 mg) by mouth daily at bedtime, No Print Out                Allergies   Allergen Reactions     Amoxicillin-Pot Clavulanate Other (See Comments), Swelling and Rash     PN: facial swelling, left side. Also had cortisone injection the same day as she started the Augmentin.  Noted in downtime recovery of chart.    PN: facial swelling, left side. Also had cortisone injection the same day as she started the Augmentin.; HUT Comment: PN: facial swelling, left side. Also had cortisone injection the same day as she started the Augmentin.Noted in downtime recovery of chart.; HUT Reaction: Rash; HUT Reaction: Unknown; HUT Reaction Type: Allergy; HUT Severity: Med; HUT Noted: [id number]     Oseltamivir Hives     med stopped, PN: med stopped  med stopped, PN: med stopped; HUT  Comment: med stopped, PN: med stopped; HUT Reaction: Hives; HUT Reaction Type: Allergy; HUT Severity: Med; HUT Noted: [id number]     Penicillins Anaphylaxis     HUT Reaction: Anaphylaxis; HUT Reaction Type: Allergy; HUT Severity: High; HUT Noted: [id number]     Vancomycin Itching, Swelling and Rash     Other reaction(s): Redness  Flushed face, very itchy; HUT Comment: Flushed face, very itchy; HUT Reaction: Angioedema; HUT Reaction: Redness; HUT Severity: Med; HUT Noted: [id number]    facial     Hydrocodone Nausea and Vomiting and GI Disturbance     vomiting for days, PN: vomiting for days; HUT Comment: vomiting for days; HUT Reaction: Gastrointestinal; HUT Reaction: Nausea And Vomiting; HUT Reaction Type: Intolerance; HUT Severity: Med; HUT Noted: [id number]  vomiting for days       Blood-Group Specific Substance Other (See Comments)     Patient has an anti-Cw and non-specific antibodies. Blood product orders may be delayed. Draw one red top and two purple top tubes for all type/screen/crossmatch orders.  Patient has anti-Cw, anti-K ([place]), Warm auto and nonspecific antibodies. Blood products may be delayed. Draw patient 24 hours prior to transfusion. Draw one red top and two purple top tubes for all type and screen orders.     Cephalosporins Rash     Influenza Vaccines Other (See Comments) and Nausea and Vomiting     HUT Reaction: Nausea And Vomiting; HUT Reaction Type: Intolerance; HUT Severity: Low; HUT Noted: [id number]     Lamotrigine Rash     Possibly associated with Lamictal.   HUT Comment: Possibly associated with Lamictal. ; HUT Reaction: Rash; HUT Reaction Type: Allergy; HUT Severity: Low; HUT Noted: [id number]     Latex Rash     HUT Reaction: Rash; HUT Reaction Type: Allergy; HUT Severity: Low; HUT Noted: [id number]        Review of Systems  A complete review of systems was performed with pertinent positives and negatives noted in the HPI, and all other systems negative.    Physical Exam   BP: 133/88  Pulse:  100  Temp: 98.4  F (36.9  C)  Resp: 16  SpO2: 100 %      Physical Exam  Constitutional:       General: She is not in acute distress.     Appearance: She is well-developed. She is not diaphoretic.      Comments: Comfortably resting, lying in bed, NAD, nondiaphoretic, lucid, fully conversant, no  respiratory distress, alert and oriented.     HENT:      Head: Normocephalic and atraumatic.   Eyes:      General: No scleral icterus.  Neck:      Musculoskeletal: Normal range of motion and neck supple.   Pulmonary:      Effort: Pulmonary effort is normal.   Abdominal:      Palpations: Abdomen is soft.      Tenderness: There is no abdominal tenderness.   Skin:     General: Skin is warm and dry.      Coloration: Skin is not pale.      Findings: No erythema or rash.   Neurological:      Mental Status: She is alert and oriented to person, place, and time.         ED Course        Procedures                           Results for orders placed or performed during the hospital encounter of 08/27/20 (from the past 24 hour(s))   UPPER GI ENDOSCOPY   Result Value Ref Range    Upper GI Endoscopy       [address] [id number] [phone]     Endoscopy Department  _______________________________________________________________________________  Patient Name: Nevin Alvarado     Procedure Date: 8/27/2020 9:55 PM  MRN: [id number]                       Account Number: [id number]  YOB: 1990             Admit Type: Inpatient  Age: 29                               Room: OR  Gender: Female                        Note Status: Finalized  Attending MD: Campbell Rogers MD  Total Sedation Time:   _______________________________________________________________________________     Procedure:           Upper GI endoscopy  Indications:         Foreign body in the stomach  Providers:           Campbell Rogers MD, Shawn Lanza RN, Jihan                         Jonny Meadows MD:          Medicines:           General Anesthesia  Complications:       No immediate complications.  ____________________________________________ ___________________________________  Procedure:           Pre-Anesthesia Assessment:                       - Prior to the procedure, a History and Physical was                        performed, and patient medications and allergies were                        reviewed. The patient is competent. The risks and                        benefits of the procedure and the sedation options and                        risks were discussed with the patient. All questions                        were answered and informed consent was obtained. Patient                        identification and proposed procedure were verified by                        the physician, the nurse and the anesthesiologist in the                        pre-procedure area in the endoscopy suite. Mental Status                        Examination: alert and oriented. Airway Examination:                        normal oropharyngeal airway and neck mobility and                        Mallampati Class II (the uvula but not tonsill ar pillars                        visualized). Respiratory Examination: clear to                        auscultation. CV Examination: normal. Prophylactic                        Antibiotics: The patient does not require prophylactic                        antibiotics. Prior Anticoagulants: The patient has taken                        no previous anticoagulant or antiplatelet agents. ASA                        Grade Assessment: II - A patient with mild systemic                        disease. After reviewing the risks and benefits, the                        patient was deemed in satisfactory condition to undergo                        the procedure. The anesthesia plan was to use general                        anesthesia. Immediately prior to administration of                         medications, the patient was re-assessed for adequacy to                        receive sedatives. The heart rate, respiratory rate,                        oxygen saturations, blood pressure, adequacy of                         pulmonary ventilation, and response to care were                        monitored throughout the procedure. The physical status                        of the patient was re-assessed after the procedure.                       After obtaining informed consent, the endoscope was                        passed under direct vision. Throughout the procedure,                        the patient's blood pressure, pulse, and oxygen                        saturations were monitored continuously. The Endoscope                        was introduced through the mouth, and advanced to the                        second part of duodenum. The upper GI endoscopy was                        accomplished without difficulty. The patient tolerated                        the procedure well.                                                                                   Findings:       One benign-appearing, intrinsic mild stenosis was found. This stenosis        measured less than one cm ( in length). The stenosis was traversed.       A medium scar was found in the mid esophagus. The scar tissue was        healthy in appearance.       Straight wire were found in the gastric body. Removal was accomplished        with a snare and simons. Scope was then reinserted for complete        examination.       The exam of the stomach was otherwise normal.       The cardia and gastric fundus were normal on retroflexion.       The examined duodenum was normal.                                                                                   Impression:          This is a 29 year old female with multiple psychiatric                        comorbidities and frequent foreign body ingestion here                        with  foreign body ingestion.                       - Benign-appearing esophageal stenosis.                       - Scar in the mid esophagus.                       - Straight wire were found in the stomach. Removal was                        successful with snare and simons.                        - Normal examined duodenum.  Recommendation:      - Return patient to ED for ongoing care.                       - Recommend admission to medicine with psychiatric                        consultation.                       - 1:1 sitter while admitted.                       - Resume previous diet.                       - Continue present medications.                                                                                       ________________________  Campbell Rogers MD  8/27/2020 10:55:07 PM  I was physically present for the entire viewing portion of the exam.  __________________________  Signature of teaching physician  [id number] Rogers MD    _______________  Jihan Fuller,   Number of Addenda: 0    Note Initiated On: 8/27/2020 9:55 PM  Scope In:  Scope Out:       Medications - No data to display          Assessments & Plan (with Medical Decision Making)   This is a 29-year-old female patient well-known to the emergency room for frequent foreign body ingestions who is presenting to the emergency department today for ingesting a metal coil from a notebook.  She is in no obvious distress upon presentation.  Vitals are otherwise stable.  Exam is otherwise unremarkable.  X-ray confirms that there is a foreign body within the stomach lumen.  The case was discussed with gastroenterology who successfully retrieved the foreign body from the operating room during an endoscopy.  The patient was observed here as well as an postanesthesia unit.  At this time I believe she is safe for discharge.  After reviewing her multiple recent visits as well as psychiatric evaluations I believe at this time there is no much  more that we can provide this patient for her psychiatric follow-up and her case coordinator will coordinate her care from this point on.  Pt was discharged home/self-care.  PT was provided written discharge instructions. Additionally verbal instructions were given and discussed with patient.  PT was asked to return to the ED immediately for any new or concerning symptoms.  Pt was in agreement, endorsed understanding, and questions were answered.     I have reviewed the nursing notes.    I have reviewed the findings, diagnosis, plan and need for follow up with the patient.    Discharge Medication List as of 8/28/2020 12:31 AM          Final diagnoses:   Swallowed foreign body, initial encounter       8/27/2020   [facility], [place], EMERGENCY DEPARTMENT     Jeffy Velasquez MD  08/29/20 1435       Jeffy Velasquez MD  09/02/20 0035

## 2020-08-27 NOTE — ED AVS SNAPSHOT
[facility], [place], Emergency Department  [address] [place] [id number]  Phone:  [phone]                                    Nevin Alvarado   MRN: [id number]    Department:  [facility], Emergency Department   Date of Visit:  8/27/2020           After Visit Summary Signature Page    I have received my discharge instructions, and my questions have been answered. I have discussed any challenges I see with this plan with the nurse or doctor.    ..........................................................................................................................................  Patient/Patient Representative Signature      ..........................................................................................................................................  Patient Representative Print Name and Relationship to Patient    ..................................................               ................................................  Date                                   Time    ..........................................................................................................................................  Reviewed by Signature/Title    ...................................................              ..............................................  Date                                               Time          22EPIC Rev 08/18

## 2020-08-28 VITALS
TEMPERATURE: 98.7 F | SYSTOLIC BLOOD PRESSURE: 131 MMHG | DIASTOLIC BLOOD PRESSURE: 88 MMHG | OXYGEN SATURATION: 100 % | HEART RATE: 85 BPM | RESPIRATION RATE: 16 BRPM

## 2020-08-28 PROCEDURE — 25000128 H RX IP 250 OP 636: Performed by: EMERGENCY MEDICINE

## 2020-08-28 RX ORDER — HEPARIN SODIUM (PORCINE) LOCK FLUSH IV SOLN 100 UNIT/ML 100 UNIT/ML
5 SOLUTION INTRAVENOUS
Status: DISCONTINUED | OUTPATIENT
Start: 2020-08-28 | End: 2020-08-28 | Stop reason: HOSPADM

## 2020-08-28 RX ADMIN — HEPARIN 5 ML: 100 SYRINGE at 00:52

## 2020-08-28 NOTE — ANESTHESIA POSTPROCEDURE EVALUATION
Anesthesia POST Procedure Evaluation    Patient: Nevin Alvarado   MRN:     [id number] Gender:   female   Age:    29 year old :      1990        Preoperative Diagnosis: Swallowed foreign body, initial encounter [T18.9XXA]   Procedure(s):  ESOPHAGOGASTRODUODENOSCOPY (EGD) with foreign body removal   Postop Comments: No value filed.     Anesthesia Type: General       Disposition: Admission   Postop Pain Control: Uneventful            Sign Out: Well controlled pain   PONV: No   Neuro/Psych: Uneventful            Sign Out: Acceptable/Baseline neuro status   Airway/Respiratory: Uneventful            Sign Out: Acceptable/Baseline resp. status   CV/Hemodynamics: Uneventful            Sign Out: Acceptable CV status   Other NRE: NONE   DID A NON-ROUTINE EVENT OCCUR? No         Last Anesthesia Record Vitals:  CRNA VITALS  2020 2212 - 2020 2246      2020             Pulse:  116    SpO2:  100 %    Resp Rate (observed):  (!) 5          Last PACU Vitals:  No vitals data found for the desired time range.        Electronically Signed By: Wale Kaplan MD, 2020, 10:46 PM

## 2020-08-28 NOTE — ED NOTES
Bed: ED09  Expected date:   Expected time:   Means of arrival:   Comments:  Nevin shaffer returning from PACU  Got 2 mg versed  100 mcg of fent in the OR    In recovery:   Got 100 mcg fent  Benadryl  8 mg zofran  8 mg decadron  500 LR

## 2020-08-28 NOTE — PROGRESS NOTES
Dr. Fuller paged and returned call to writer. Writer updating her that ER is planning on discharging patient to home with mother. No further orders at this time.

## 2020-08-28 NOTE — ED NOTES
Pt placed in Room 9. Safety secure screen in place. Pt put in gown. Pt wanted to keep pants on. 1:1 initiated upon arrival to room. Security wanded pt.  Pt's purse, shoes, shirt and bra placed in pt belongings bag outside of room. Pt requested to have door open. Door is closed with windows open in room. Pt has her personal phone.

## 2020-08-28 NOTE — ED NOTES
[facility] called for pt for discharge. Pt cooperative with plan. Port de-accessed.

## 2020-08-28 NOTE — BRIEF OP NOTE
Gastroenterology Endoscopy [facility] Brief Operative Note    Procedure:  Upper endoscopy   Post-operative diagnosis:  Foreign body removal   Staff Physician:  Dr. Rogers   Fellow/Assistant(s):  Jihan Fuller    Specimens:  Please see final procedure note for further details.   Findings:  5cm straightened wire in the gastric body removed with simons and snare.   Complications:  None.   Condition:  Stable   Recommendations  Diet:  Return to previous diet  PPI:  N/A  Anti-coagulants/platelets:  N/A  Octreotide:  N/A  Discharge Planning:   - Return to ED for dispo  - Recommend admission to medicine with psychiatry consultation

## 2020-08-28 NOTE — ANESTHESIA CARE TRANSFER NOTE
Patient: Nevin Alvarado    Procedure(s):  ESOPHAGOGASTRODUODENOSCOPY (EGD) with foreign body removal    Diagnosis: Swallowed foreign body, initial encounter [T18.9XXA]  Diagnosis Additional Information: No value filed.    Anesthesia Type:   General     Note:  Airway :Face Mask  Patient transferred to:PACU  Handoff Report: Identifed the Patient, Identified the Reponsible Provider, Reviewed the pertinent medical history, Discussed the surgical course, Reviewed Intra-OP anesthesia mangement and issues during anesthesia, Set expectations for post-procedure period and Allowed opportunity for questions and acknowledgement of understanding      Vitals: (Last set prior to Anesthesia Care Transfer)    CRNA VITALS  8/27/2020 2212 - 8/27/2020 2247      8/27/2020             Pulse:  116    SpO2:  100 %    Resp Rate (observed):  (!) 5                Electronically Signed By: HU Wiggins CRNA  August 27, 2020  10:47 PM

## 2020-08-28 NOTE — ANESTHESIA PREPROCEDURE EVALUATION
Anesthesia Pre-Procedure Evaluation    Patient: Nevin Alvarado   MRN:     [id number] Gender:   female   Age:    29 year old :      1990        Preoperative Diagnosis: Swallowed foreign body, initial encounter [T18.9XXA]   Procedure(s):  ESOPHAGOGASTRODUODENOSCOPY (EGD)     HPI:  Swallowed 3 inches of metal notebook coil around 45 minutes ago. Now complains of throat pain.    LABS:  CBC:   Lab Results   Component Value Date    WBC 10.7 2020    WBC 10.5 2020    HGB 11.9 2020    HGB 12.3 2020    HCT 37.1 2020    HCT 38.8 2020     2020     2020     BMP:   Lab Results   Component Value Date     2020     2020    POTASSIUM 3.5 2020    POTASSIUM 3.5 2020    CHLORIDE 109 2020    CHLORIDE 111 (H) 2020    CO2 25 2020    CO2 23 2020    BUN 11 2020    BUN 8 2020    CR 0.61 2020    CR 0.76 2020     (H) 2020    GLC 98 2020     COAGS:   Lab Results   Component Value Date    PTT 46 (H) 2020    INR 1.11 2020     POC:   Lab Results   Component Value Date     (H) 2020    HCG Negative 2020    HCGS Negative 2020     OTHER:   Lab Results   Component Value Date    LACT 1.0 2019    KELBY 8.8 2020    PHOS 3.5 2019    MAG 1.9 2019    ALBUMIN 3.5 2020    PROTTOTAL 8.8 2020    ALT 31 2020    AST 20 2020    ALKPHOS 86 2020    BILITOTAL 0.3 2020    LIPASE 146 02/15/2020    TSH 4.56 (H) 02/15/2020    T4 0.78 02/15/2020    CRP 6.6 2019    SED 26 (H) 2017        Preop Vitals    BP Readings from Last 3 Encounters:   20 133/88   20 127/72   20 (!) 136/91    Pulse Readings from Last 3 Encounters:   20 100   20 82   20 85      Resp Readings from Last 3 Encounters:   20 16   20 16   20 18    SpO2 Readings from Last 3  "Encounters:   08/27/20 100%   08/19/20 98%   08/02/20 97%      Temp Readings from Last 1 Encounters:   08/27/20 36.9  C (98.4  F) (Oral)    Ht Readings from Last 1 Encounters:   08/01/20 1.575 m (5' 2\")      Wt Readings from Last 1 Encounters:   08/01/20 115.7 kg (255 lb)    Estimated body mass index is 46.64 kg/m  as calculated from the following:    Height as of 8/1/20: 1.575 m (5' 2\").    Weight as of 8/1/20: 115.7 kg (255 lb).     LDA:  Port A Cath Single 08/27/20 Right Chest wall (Active)   Number of days: 0        Past Medical History:   Diagnosis Date     ADD (attention deficit disorder)      Anorexia nervosa with bulimia     history of; on Topamax     Anxiety      Borderline personality disorder (H)      Depression      H/O self-harm      History of pulmonary embolism 12/2019    Provoked. Completed 3 month course of Apixaban     Morbid obesity (H)      Neuropathy      PTSD (post-traumatic stress disorder)      Rectal foreign body - Recurrent issue, self placed      Swallowed foreign body - Recurrent issue, self placed       Past Surgical History:   Procedure Laterality Date     COMBINED ESOPHAGOSCOPY, GASTROSCOPY, DUODENOSCOPY (EGD), REPLACE ESOPHAGEAL STENT N/A 10/9/2019    Procedure: Upper Endoscopy with Suture Placement;  Surgeon: Zurdo Ramirez MD;  [facility]: [facility]     ESOPHAGOSCOPY, GASTROSCOPY, DUODENOSCOPY (EGD), COMBINED N/A 3/9/2017    Procedure: COMBINED ESOPHAGOSCOPY, GASTROSCOPY, DUODENOSCOPY (EGD), REMOVE FOREIGN BODY;  Surgeon: Avis Guzmán MD;  Location: [facility]     ESOPHAGOSCOPY, GASTROSCOPY, DUODENOSCOPY (EGD), COMBINED N/A 4/20/2017    Procedure: COMBINED ESOPHAGOSCOPY, GASTROSCOPY, DUODENOSCOPY (EGD), REMOVE FOREIGN BODY;  EGD removal Foregin body;  Surgeon: Lokesh Paula MD;  Location: [facility]     ESOPHAGOSCOPY, GASTROSCOPY, DUODENOSCOPY (EGD), COMBINED N/A 6/12/2017    Procedure: COMBINED ESOPHAGOSCOPY, GASTROSCOPY, DUODENOSCOPY (EGD);  COMBINED ESOPHAGOSCOPY, " GASTROSCOPY, DUODENOSCOPY (EGD) [[id number]]attempted removal of foreign body;  Surgeon: Pamela Perez MD;  [facility]: [facility]     ESOPHAGOSCOPY, GASTROSCOPY, DUODENOSCOPY (EGD), COMBINED N/A 6/9/2017    Procedure: COMBINED ESOPHAGOSCOPY, GASTROSCOPY, DUODENOSCOPY (EGD), REMOVE FOREIGN BODY;  Esophagoscopy, Gastroscopy, Duodenoscopy, Removal of Foreign Body;  Surgeon: Dejon Marsh MD;  Location: [facility]     ESOPHAGOSCOPY, GASTROSCOPY, DUODENOSCOPY (EGD), COMBINED N/A 1/6/2018    Procedure: COMBINED ESOPHAGOSCOPY, GASTROSCOPY, DUODENOSCOPY (EGD), REMOVE FOREIGN BODY;  COMBINED ESOPHAGOSCOPY, GASTROSCOPY, DUODENOSCOPY (EGD) [by pascal net and snare with profol sedation;  Surgeon: Feliciano Emmanuel MD;  Location:  GI     ESOPHAGOSCOPY, GASTROSCOPY, DUODENOSCOPY (EGD), COMBINED N/A 3/19/2018    Procedure: COMBINED ESOPHAGOSCOPY, GASTROSCOPY, DUODENOSCOPY (EGD), REMOVE FOREIGN BODY;   Esophagodscopy, Gastroscopy, Duodenoscopy,Foreign Body Removal;  Surgeon: Brice Guzmán MD;  [facility]: [facility]     ESOPHAGOSCOPY, GASTROSCOPY, DUODENOSCOPY (EGD), COMBINED N/A 4/16/2018    Procedure: COMBINED ESOPHAGOSCOPY, GASTROSCOPY, DUODENOSCOPY (EGD), REMOVE FOREIGN BODY;  Esophagogastroduodenoscopy  Foreign Body Removal X 2;  Surgeon: Royer Moise MD;  [facility]: [facility]     ESOPHAGOSCOPY, GASTROSCOPY, DUODENOSCOPY (EGD), COMBINED N/A 6/1/2018    Procedure: COMBINED ESOPHAGOSCOPY, GASTROSCOPY, DUODENOSCOPY (EGD), REMOVE FOREIGN BODY;  COMBINED ESOPHAGOSCOPY, GASTROSCOPY, DUODENOSCOPY with removal of foreign body, propofol sedation by anesthesia;  Surgeon: Brice Martinez MD;  Location:  GI     ESOPHAGOSCOPY, GASTROSCOPY, DUODENOSCOPY (EGD), COMBINED N/A 7/25/2018    Procedure: COMBINED ESOPHAGOSCOPY, GASTROSCOPY, DUODENOSCOPY (EGD), REMOVE FOREIGN BODY;;  Surgeon: Candy Castelan MD;  Location:  GI     ESOPHAGOSCOPY, GASTROSCOPY, DUODENOSCOPY (EGD), COMBINED N/A 7/28/2018     Procedure: COMBINED ESOPHAGOSCOPY, GASTROSCOPY, DUODENOSCOPY (EGD), REMOVE FOREIGN BODY;  COMBINED ESOPHAGOSCOPY, GASTROSCOPY, DUODENOSCOPY (EGD), REMOVE FOREIGN BODY;  Surgeon: Brice Guzmán MD;  [facility]: [facility]     ESOPHAGOSCOPY, GASTROSCOPY, DUODENOSCOPY (EGD), COMBINED N/A 7/31/2018    Procedure: COMBINED ESOPHAGOSCOPY, GASTROSCOPY, DUODENOSCOPY (EGD);  COMBINED ESOPHAGOSCOPY, GASTROSCOPY, DUODENOSCOPY (EGD) TO REMOVE FOREIGN BODY;  Surgeon: Lokesh Paula MD;  [facility]     ESOPHAGOSCOPY, GASTROSCOPY, DUODENOSCOPY (EGD), COMBINED N/A 8/4/2018    Procedure: COMBINED ESOPHAGOSCOPY, GASTROSCOPY, DUODENOSCOPY (EGD), REMOVE FOREIGN BODY;   combined esophagoscopy, gastroscopy, duodenoscopy, REMOVE FOREIGN BODY ;  Surgeon: Lokesh Paula MD;  [facility]: [facility]     ESOPHAGOSCOPY, GASTROSCOPY, DUODENOSCOPY (EGD), COMBINED N/A 10/6/2019    Procedure: ESOPHAGOGASTRODUODENOSCOPY (EGD) with fireign body removal x2, esophageal stent placement with floroscopy;  Surgeon: Timoteo Espana MD;  [facility]     ESOPHAGOSCOPY, GASTROSCOPY, DUODENOSCOPY (EGD), COMBINED N/A 12/2/2019    Procedure: Esophagogastroduodenoscopy with esophageal stent removal, esophogram;  Surgeon: Kailee Tena MD;  [facility]: [facility]     ESOPHAGOSCOPY, GASTROSCOPY, DUODENOSCOPY (EGD), COMBINED N/A 12/17/2019    Procedure: ESOPHAGOGASTRODUODENOSCOPY, WITH FOREIGN BODY REMOVAL;  Surgeon: Pamela Perez MD;  [facility]     ESOPHAGOSCOPY, GASTROSCOPY, DUODENOSCOPY (EGD), COMBINED N/A 12/13/2019    Procedure: ESOPHAGOGASTRODUODENOSCOPY, WITH FOREIGN BODY REMOVAL;  Surgeon: Samia Stanton MD;  [facility]     ESOPHAGOSCOPY, GASTROSCOPY, DUODENOSCOPY (EGD), COMBINED N/A 12/28/2019    Procedure: ESOPHAGOGASTRODUODENOSCOPY (EGD) Removal of Foreign Body X 2;  Surgeon: Huy Kelley MD;  [facility]     ESOPHAGOSCOPY, GASTROSCOPY, DUODENOSCOPY (EGD), COMBINED N/A 1/5/2020    Procedure:  ESOPHAGOGASTRODUOENOSCOPY WITH FOREIGN BODY REMOVAL;  Surgeon: Pamela Perez MD;  [facility]     ESOPHAGOSCOPY, GASTROSCOPY, DUODENOSCOPY (EGD), COMBINED N/A 1/3/2020    Procedure: ESOPHAGOGASTRODUODENOSCOPY (EGD) REMOVAL OF FOREIGN BODY.;  Surgeon: Pamela Perez MD;  [facility]     ESOPHAGOSCOPY, GASTROSCOPY, DUODENOSCOPY (EGD), COMBINED N/A 1/13/2020    Procedure: ESOPHAGOGASTRODUODENOSCOPY (EGD) for foreign body removal;  Surgeon: Lokesh Paula MD;  [facility]     ESOPHAGOSCOPY, GASTROSCOPY, DUODENOSCOPY (EGD), COMBINED N/A 1/18/2020    Procedure: Diagnostic ESOPHAGOGASTRODUODENOSCOPY (EGD;  Surgeon: Lokesh Paula MD;  [facility]     ESOPHAGOSCOPY, GASTROSCOPY, DUODENOSCOPY (EGD), COMBINED N/A 3/29/2020    Procedure: UPPER ENDOSCOPY WITH FOREIGN BODY REMOVAL;  Surgeon: Doug Hansen MD;  [facility]     ESOPHAGOSCOPY, GASTROSCOPY, DUODENOSCOPY (EGD), COMBINED N/A 7/11/2020    Procedure: ESOPHAGOGASTRODUODENOSCOPY (EGD); Upper Endoscopy WITH FOREIGN BODY REMOVAL;  Surgeon: Lyndsey Mendoza DO;  [facility]: [facility]     ESOPHAGOSCOPY, GASTROSCOPY, DUODENOSCOPY (EGD), COMBINED N/A 7/29/2020    Procedure: ESOPHAGOGASTRODUODENOSCOPY REMOVAL OF FOREIGN BODY;  Surgeon: Samia Stanton MD;  [facility]     ESOPHAGOSCOPY, GASTROSCOPY, DUODENOSCOPY (EGD), COMBINED N/A 8/1/2020    Procedure: ESOPHAGOGASTRODUODENOSCOPY, WITH FOREIGN BODY REMOVAL;  Surgeon: Pamela Perez MD;  [facility]     ESOPHAGOSCOPY, GASTROSCOPY, DUODENOSCOPY (EGD), COMBINED N/A 8/18/2020    Procedure: ESOPHAGOGASTRODUODENOSCOPY (EGD) for foreign body removal;  Surgeon: Pamela Perez MD;  [facility]     EXAM UNDER ANESTHESIA ANUS N/A 1/10/2017    Procedure: EXAM UNDER ANESTHESIA ANUS;  Surgeon: Annmarie Haynes MD;  [facility]     EXAM UNDER ANESTHESIA RECTUM N/A 7/19/2018    Procedure: EXAM UNDER ANESTHESIA RECTUM;   EXAM UNDER ANESTHESIA, REMOVAL OF RECTAL FOREIGN BODY;  Surgeon: Annmarie Haynes MD;  [facility]     HC REMOVE FECAL IMPACTION OR FB W ANESTHESIA N/A 12/18/2016    Procedure: REMOVE FECAL IMPACTION/FOREIGN BODY UNDER ANESTHESIA;  Surgeon: Soham Cano MD;  Location: [facility]     KNEE SURGERY - removed a small tissue mass from knee Right      LAPAROSCOPIC ABLATION ENDOMETRIOSIS       LAPAROTOMY EXPLORATORY N/A 1/10/2017    Procedure: LAPAROTOMY EXPLORATORY;  Surgeon: Annmarie Haynes MD;  [facility]: [facility]     LAPAROTOMY EXPLORATORY  09/11/2019    Manual manipulation of bowel to remove pill bottle in rectum     lymph nodes removed from neck; benign  age 7     MAMMOPLASTY REDUCTION Bilateral      RELEASE CARPAL TUNNEL Bilateral      SIGMOIDOSCOPY FLEXIBLE N/A 1/10/2017    Procedure: SIGMOIDOSCOPY FLEXIBLE;  Surgeon: Annmarie Haynes MD;  [facility]     SIGMOIDOSCOPY FLEXIBLE N/A 5/8/2018    Procedure: SIGMOIDOSCOPY FLEXIBLE;  flex sig with foreign body removal using snare and rattooth forcep;  Surgeon: Soham Cano MD;  Location: [facility]     SIGMOIDOSCOPY FLEXIBLE N/A 2/20/2019    Procedure: Exam under anesthesia Colonoscopy with attempted  removal of rectal foreign body;  Surgeon: Estrada Chávez MD;  [facility]      Allergies   Allergen Reactions     Amoxicillin-Pot Clavulanate Other (See Comments), Swelling and Rash     PN: facial swelling, left side. Also had cortisone injection the same day as she started the Augmentin.  Noted in downtime recovery of chart.       Penicillins Anaphylaxis     Vancomycin Itching, Swelling and Rash     Other reaction(s): Redness  Flushed face, very itchy; HUT Comment: Flushed face, very itchy; HUT Reaction: Angioedema; HUT Reaction: Redness; HUT Severity: Med; HUT Noted: [id number]       Hydrocodone Nausea and Vomiting and GI Disturbance     vomiting for days, PN: vomiting for days; HUT Comment: vomiting for days; HUT Reaction:  Gastrointestinal; HUT Reaction: Nausea And Vomiting; HUT Reaction Type: Intolerance; HUT Severity: Med; HUT Noted: [id number]  vomiting for days       Blood-Group Specific Substance Other (See Comments)     Patient has an anti-Cw and non-specific antibodies. Blood product orders may be delayed. Draw one red top and two purple top tubes for all type/screen/crossmatch orders.     Influenza Vaccines Other (See Comments)     Oseltamivir Hives     med stopped, PN: med stopped     Cephalosporins Rash     Lamotrigine Rash     Possibly associated with Lamictal.   HUT Comment: Possibly associated with Lamictal. ; HUT Reaction: Rash; HUT Reaction Type: Allergy; HUT Severity: Low; HUT Noted: [id number]     Latex Rash        Anesthesia Evaluation     . Pt has had prior anesthetic.     No history of anesthetic complications          ROS/MED HX    ENT/Pulmonary:       Neurologic:       Cardiovascular:         METS/Exercise Tolerance:     Hematologic:     (+) History of blood clots -      Musculoskeletal:         GI/Hepatic: Comment: Foreign body ingestion    (+) GERD       Renal/Genitourinary:      (-) renal disease   Endo:     (+) Obesity, .      Psychiatric:     (+) psychiatric history anxiety, depression, other (comment) and bipolar      Infectious Disease:         Malignancy:         Other:                         PHYSICAL EXAM:   Mental Status/Neuro: A/A/O   Airway: Facies: Feasible  Mallampati: I  Mouth/Opening: Full  TM distance: > 6 cm  Neck ROM: Full   Respiratory: Auscultation: CTAB     Resp. Rate: Normal     Resp. Effort: Normal      CV: Rhythm: Regular  Rate: Age appropriate  Heart: Normal Sounds  Edema: None   Comments:      Dental: Normal Dentition                Assessment:   ASA SCORE: 3 emergent   H&P: History and physical reviewed and following examination; no interval change.   Smoking Status:  Non-Smoker/Unknown        Plan:   Anes. Type:  General   Pre-Medication: None   Induction:  IV (Standard)   Airway: ETT;  Oral   Access/Monitoring: PIV   Maintenance: Balanced     Postop Plan:   Postop Pain: Opioids  Postop Sedation/Airway: Not planned  Disposition: Outpatient     PONV Management:   Adult Risk Factors: Female, Postop Opioids   Prevention: Ondansetron     CONSENT: Direct conversation   Plan and risks discussed with: Patient   Blood Products: Consent Deferred (Minimal Blood Loss)       Comments for Plan/Consent:  Patient here for emergency  Upper endoscopy and foreign body retrieval.    Care plan in place: no benzodiazapines, limited or no opioids.                  MD Wale Short III, MD  Staff Anesthesiologist  [phone]

## 2020-08-28 NOTE — PROGRESS NOTES
Patient transferred to ER 9. Writer with patient until ED RN arrived. Patient made contact with attendant at her home in anticipation for discharge.

## 2020-08-28 NOTE — PROGRESS NOTES
Ms. Alvarado is a 30 yo female with a history of multiple psychiatric comorbidities including frequent admissions for foreign body ingestion who presented to the ED after swallowing the straightened coil of a notebook visualized in the stomach on abdominal x-ray. Plan for urgent EGD for extraction. Case called in to the OR for removal. Patient consented as reports no guardian since April.    Discussed with Dr. Rogers.    Jihan Fuller MD  GI Fellow  P: [phone]

## 2020-08-30 ENCOUNTER — PATIENT OUTREACH (OUTPATIENT)
Dept: CARE COORDINATION | Facility: CLINIC | Age: 30
End: 2020-08-30

## 2020-08-31 ENCOUNTER — APPOINTMENT (OUTPATIENT)
Dept: CT IMAGING | Facility: CLINIC | Age: 30
End: 2020-08-31
Attending: EMERGENCY MEDICINE
Payer: COMMERCIAL

## 2020-08-31 ENCOUNTER — HOSPITAL ENCOUNTER (EMERGENCY)
Facility: CLINIC | Age: 30
Discharge: HOME | End: 2020-08-31
Attending: EMERGENCY MEDICINE | Admitting: EMERGENCY MEDICINE
Payer: COMMERCIAL

## 2020-08-31 VITALS
TEMPERATURE: 98.7 F | OXYGEN SATURATION: 98 % | WEIGHT: 267.2 LBS | RESPIRATION RATE: 16 BRPM | HEIGHT: 62 IN | DIASTOLIC BLOOD PRESSURE: 85 MMHG | HEART RATE: 93 BPM | SYSTOLIC BLOOD PRESSURE: 144 MMHG | BODY MASS INDEX: 49.17 KG/M2

## 2020-08-31 DIAGNOSIS — R07.0 THROAT PAIN: ICD-10-CM

## 2020-08-31 LAB
CREAT BLD-MCNC: 0.6 MG/DL (ref 0.52–1.04)
GFR SERPL CREATININE-BSD FRML MDRD: >90 ML/MIN/{1.73_M2}

## 2020-08-31 PROCEDURE — 25000128 H RX IP 250 OP 636: Performed by: EMERGENCY MEDICINE

## 2020-08-31 PROCEDURE — 36415 COLL VENOUS BLD VENIPUNCTURE: CPT | Performed by: EMERGENCY MEDICINE

## 2020-08-31 PROCEDURE — 99285 EMERGENCY DEPT VISIT HI MDM: CPT | Mod: Z6 | Performed by: EMERGENCY MEDICINE

## 2020-08-31 PROCEDURE — 96374 THER/PROPH/DIAG INJ IV PUSH: CPT | Mod: 59 | Performed by: EMERGENCY MEDICINE

## 2020-08-31 PROCEDURE — 71260 CT THORAX DX C+: CPT

## 2020-08-31 PROCEDURE — 99285 EMERGENCY DEPT VISIT HI MDM: CPT | Mod: 25 | Performed by: EMERGENCY MEDICINE

## 2020-08-31 PROCEDURE — 70491 CT SOFT TISSUE NECK W/DYE: CPT

## 2020-08-31 PROCEDURE — 25000125 ZZHC RX 250: Performed by: EMERGENCY MEDICINE

## 2020-08-31 PROCEDURE — 82565 ASSAY OF CREATININE: CPT

## 2020-08-31 RX ORDER — IOPAMIDOL 755 MG/ML
100 INJECTION, SOLUTION INTRAVASCULAR ONCE
Status: COMPLETED | OUTPATIENT
Start: 2020-08-31 | End: 2020-08-31

## 2020-08-31 RX ORDER — ONDANSETRON 4 MG/1
4 TABLET, ORALLY DISINTEGRATING ORAL ONCE
Status: COMPLETED | OUTPATIENT
Start: 2020-08-31 | End: 2020-08-31

## 2020-08-31 RX ORDER — LIDOCAINE HYDROCHLORIDE 20 MG/ML
10 SOLUTION OROPHARYNGEAL ONCE
Status: COMPLETED | OUTPATIENT
Start: 2020-08-31 | End: 2020-08-31

## 2020-08-31 RX ORDER — LIDOCAINE HYDROCHLORIDE 20 MG/ML
15 SOLUTION OROPHARYNGEAL EVERY 4 HOURS PRN
Qty: 100 ML | Refills: 0 | Status: SHIPPED | OUTPATIENT
Start: 2020-08-31 | End: 2020-08-31

## 2020-08-31 RX ORDER — LIDOCAINE HYDROCHLORIDE 20 MG/ML
15 SOLUTION OROPHARYNGEAL EVERY 4 HOURS PRN
Qty: 100 ML | Refills: 0 | Status: ON HOLD | OUTPATIENT
Start: 2020-08-31 | End: 2020-09-20

## 2020-08-31 RX ORDER — MORPHINE SULFATE 4 MG/ML
4 INJECTION, SOLUTION INTRAMUSCULAR; INTRAVENOUS ONCE
Status: COMPLETED | OUTPATIENT
Start: 2020-08-31 | End: 2020-08-31

## 2020-08-31 RX ORDER — HEPARIN SODIUM (PORCINE) LOCK FLUSH IV SOLN 100 UNIT/ML 100 UNIT/ML
5 SOLUTION INTRAVENOUS
Status: DISCONTINUED | OUTPATIENT
Start: 2020-08-31 | End: 2020-08-31 | Stop reason: HOSPADM

## 2020-08-31 RX ADMIN — LIDOCAINE HYDROCHLORIDE 10 ML: 20 SOLUTION ORAL; TOPICAL at 17:51

## 2020-08-31 RX ADMIN — HEPARIN 5 ML: 100 SYRINGE at 18:41

## 2020-08-31 RX ADMIN — MORPHINE SULFATE 4 MG: 4 INJECTION INTRAVENOUS at 17:12

## 2020-08-31 RX ADMIN — IOPAMIDOL 100 ML: 755 INJECTION, SOLUTION INTRAVENOUS at 15:48

## 2020-08-31 RX ADMIN — ONDANSETRON 4 MG: 4 TABLET, ORALLY DISINTEGRATING ORAL at 18:02

## 2020-08-31 ASSESSMENT — MIFFLIN-ST. JEOR: SCORE: 1895.26

## 2020-08-31 NOTE — PROGRESS NOTES
Patient states that she feels like her throat is getting worse.  It hurts to swallow and the pain is increasing.  She feels like when she swallows her throat is ripping back open.  Her Neck hurts to move her head.  She is concerned her throat is doing worse.

## 2020-08-31 NOTE — PROGRESS NOTES
Called patient to see how she was feeling.  Patient states that she is having trouble breathing and swallowing   She states that her next is painful with movement.      As patient is unknown in the GI clinic and is having airway problems with breathing patient was advised to go to the ED to be evaluated.

## 2020-08-31 NOTE — PROGRESS NOTES
[facility]: Post-Discharge Note  SITUATION                                                      Admission:    Admission Date: 08/27/20   Reason for Admission: Foriegn Body  Discharge:   Discharge Date: 08/28/20  Discharge Diagnosis: Foriegn Body    BACKGROUND                                                      Nevin Alvarado is a 29 year old female with a past medical history of bipolar disorder, ADD, anorexia, PTSD, and frequent admissions for foreign body ingestion who presents to the Emergency Department after swallowing a few inches of metal notebook coil.  At this time she denies any chest, abdominal or throat pain.     Per chart review, the patient was hospitalized from 8/18 - 8/19 after swallowing a spiral notebook coil. She underwent EGD with retrieval of the object on 8/18. At the time of discharge she was tolerating regular diet, denied suicidal ideation, and the urge to harm herself or swallow anything.      I have reviewed the Medications, Allergies, Past Medical and Surgical History, and Social History in the Epic system.    ASSESSMENT      Discharge Assessment  Patient reports symptoms are: Worsening  Does the patient have all of their medications?: Yes  Does patient know what their new medications are for?: Yes  Does patient have a follow-up appointment scheduled?: No  Does patient have any other questions or concerns?: Yes(throat feels like it is getting worse and feels like it is ripping open)    Post-op  Did the patient have surgery or a procedure: Yes  Drainage: No  Bleeding: none  Fever: No  Chills: No  Warmth: No  Swelling: Yes  Incision site pain: Yes  Eating & Drinking: unable to drink liquids  PO Intake: full liquids  Bowel Function: normal  Urinary Status: voiding without complaint/concerns        PLAN                                                      Outpatient Plan:  Recommendation:      - Return patient to ED for ongoing care.                       -  Recommend admission to medicine with psychiatric                        consultation.                       - 1:1 sitter while admitted.                       - Resume previous diet.                       - Continue present medications    No future appointments.        Humera Espinosa, CMA

## 2020-08-31 NOTE — PROGRESS NOTES
Emergency Social Work Services Note    Date of  Intervention: 08/31/20  Last Emergency Department Visit:  8/28/20 at [facility]  Care Plan:  Yes, see One Plan for more information  Collaborated with:  EBENEZER Orellana; CARMEN De Jesus; Dr. Rodriguez; patient; Henny [phone]    Data:  Pt presents to the ED today with post-endoscopy concerns.    SW consulted to assist in arranging a ride home.  She will be discharged home soon and has no ride and no money.     Intervention:  Chart reviewed.  Pt familiar to writer from previous visits.    SW met with pt, introduced self, role and reason for the visit.  Pt states that a staff member from her building drove her here to the ED with intention of driving her home after being discharged.  However, staff found out they were not supposed to stay with pt in the ED so returned to the building.  She reports that the overnight staff person comes in at 10pm but cannot leave the building for the night.  Pt needs a ride home; can take a cab.      Writer called [employer] [facility] [employer] [facility] and spoke with the after-hours Nurse Line staff who brokered the ride with [employer] ([phone]) for immediate pickup at the ER entrance.  ([employer] utilizes several different cab companies so the exact cab company that will  patient is unknown, thus pt may need to approach any taxi that comes to the main entrance to determine if it is her taxi.  It is scheduled under her name.)    Writer brian De Jesus RN, then called back and spoke with EBENEZER Orellana.    Assessment:  Ride home.    Plan:    Anticipated Disposition:  Home    Barriers to d/c plan:  None.    Follow Up:  ALLAN available as needed.    DENIS Warner  Social Work Services  Emergency Department   [phone] phone  [phone] pager  8:00am-8:30pm Mon-Sat    On-call pager, [phone], 4:00pm to midnight

## 2020-08-31 NOTE — PROGRESS NOTES
Brief GI Note    Discussed CT findings with Dr Rodriguez in the ED, no signs of perforation or pneumomediastinum. No report of foreign body ingestion.  - role of EGD would be limited in this situation  - viscous lidocaine and po PPI for symptomatic management  - ok to discharge if no other concerning signs or symptoms and if feeling better; if patient is admitted/obs overnight, please consult GI formally in the AM.    Please call or page GI with any questions or concerns.    Sonam TINSLEY MD  Gastroenterology Fellow  Division of Gastroenterology, Hepatology and Nutrition  [facility]  Text page Pager [phone]

## 2020-08-31 NOTE — ED AVS SNAPSHOT
[facility], [place], Emergency Department  [address] [place] [id number]  Phone:  [phone]                                    Nevin Alvarado   MRN: [id number]    Department:  [facility], Emergency Department   Date of Visit:  8/31/2020           After Visit Summary Signature Page    I have received my discharge instructions, and my questions have been answered. I have discussed any challenges I see with this plan with the nurse or doctor.    ..........................................................................................................................................  Patient/Patient Representative Signature      ..........................................................................................................................................  Patient Representative Print Name and Relationship to Patient    ..................................................               ................................................  Date                                   Time    ..........................................................................................................................................  Reviewed by Signature/Title    ...................................................              ..............................................  Date                                               Time          22EPIC Rev 08/18

## 2020-08-31 NOTE — ED TRIAGE NOTES
Patient presents ambulatory to triage with c/o ENT problem. Patient reports she had an endoscopy on Thursday and Friday and was told a tear or scratch was made during procedure. Patient reports pain/difficulty swallowing and pain in upper back when she swallows; states her throat feels like it is tearing when she turns her head.

## 2020-08-31 NOTE — ED PROVIDER NOTES
[place] EMERGENCY DEPARTMENT ([facility])  August 31, 2020  History     Chief Complaint   Patient presents with     Ent Problem     HPI  Nevin Alvarado is a 29 year old female bipolar disorder, ADD, anorexia, PTSD, and frequent admissions for foreign body ingestion who presents to the Emergency Department difficulty swallowing and pain when swallowing.  Patient underwent endoscopy on 8/27 and 8/28 after swallowing foreign bodies.  Patient states that she was told that there is a possible scratch or in her esophagus as a result of the endoscopy.  Patient was recovering well and asymptomatic until yesterday when she developed pain while drinking water.  She notes a tearing pain in her throat if she tries to eat or drink anything as well as turn her head or put pressure on the left side of her neck.  She also notes upper back pain with taking a deep breath.  Patient has previous esophageal perforation but states this feels somewhat different.  She denies fever chills or any other associated symptoms.  Patient denies any other foreign body ingestion since the previous endoscopy.    PAST MEDICAL HISTORY:   Past Medical History:   Diagnosis Date     ADD (attention deficit disorder)      Anorexia nervosa with bulimia     history of; on Topamax     Anxiety      Borderline personality disorder (H)      Depression      H/O self-harm      History of pulmonary embolism 12/2019    Provoked. Completed 3 month course of Apixaban     Morbid obesity (H)      Neuropathy      PTSD (post-traumatic stress disorder)      Rectal foreign body - Recurrent issue, self placed      Swallowed foreign body - Recurrent issue, self placed        PAST SURGICAL HISTORY:   Past Surgical History:   Procedure Laterality Date     COMBINED ESOPHAGOSCOPY, GASTROSCOPY, DUODENOSCOPY (EGD), REPLACE ESOPHAGEAL STENT N/A 10/9/2019    Procedure: Upper Endoscopy with Suture Placement;  Surgeon: Zurdo Ramirez MD;  Location:  OR      ESOPHAGOSCOPY, GASTROSCOPY, DUODENOSCOPY (EGD), COMBINED N/A 3/9/2017    Procedure: COMBINED ESOPHAGOSCOPY, GASTROSCOPY, DUODENOSCOPY (EGD), REMOVE FOREIGN BODY;  Surgeon: Avis Guzmán MD;  [facility]     ESOPHAGOSCOPY, GASTROSCOPY, DUODENOSCOPY (EGD), COMBINED N/A 4/20/2017    Procedure: COMBINED ESOPHAGOSCOPY, GASTROSCOPY, DUODENOSCOPY (EGD), REMOVE FOREIGN BODY;  EGD removal Foregin body;  Surgeon: Lokesh Paula MD;  [facility]     ESOPHAGOSCOPY, GASTROSCOPY, DUODENOSCOPY (EGD), COMBINED N/A 6/12/2017    Procedure: COMBINED ESOPHAGOSCOPY, GASTROSCOPY, DUODENOSCOPY (EGD);  COMBINED ESOPHAGOSCOPY, GASTROSCOPY, DUODENOSCOPY (EGD) [[id number]]attempted removal of foreign body;  Surgeon: Pamela Perez MD;  [facility]     ESOPHAGOSCOPY, GASTROSCOPY, DUODENOSCOPY (EGD), COMBINED N/A 6/9/2017    Procedure: COMBINED ESOPHAGOSCOPY, GASTROSCOPY, DUODENOSCOPY (EGD), REMOVE FOREIGN BODY;  Esophagoscopy, Gastroscopy, Duodenoscopy, Removal of Foreign Body;  Surgeon: Dejon Marsh MD;  [facility]     ESOPHAGOSCOPY, GASTROSCOPY, DUODENOSCOPY (EGD), COMBINED N/A 1/6/2018    Procedure: COMBINED ESOPHAGOSCOPY, GASTROSCOPY, DUODENOSCOPY (EGD), REMOVE FOREIGN BODY;  COMBINED ESOPHAGOSCOPY, GASTROSCOPY, DUODENOSCOPY (EGD) [by pascal net and snare with profol sedation;  Surgeon: Feliciano Emmanuel MD;  Location:  GI     ESOPHAGOSCOPY, GASTROSCOPY, DUODENOSCOPY (EGD), COMBINED N/A 3/19/2018    Procedure: COMBINED ESOPHAGOSCOPY, GASTROSCOPY, DUODENOSCOPY (EGD), REMOVE FOREIGN BODY;   Esophagodscopy, Gastroscopy, Duodenoscopy,Foreign Body Removal;  Surgeon: Brice Guzmán MD;  [facility]     ESOPHAGOSCOPY, GASTROSCOPY, DUODENOSCOPY (EGD), COMBINED N/A 4/16/2018    Procedure: COMBINED ESOPHAGOSCOPY, GASTROSCOPY, DUODENOSCOPY (EGD), REMOVE FOREIGN BODY;  Esophagogastroduodenoscopy  Foreign Body Removal X 2;  Surgeon: Royer Moise MD;  [facility]      ESOPHAGOSCOPY, GASTROSCOPY, DUODENOSCOPY (EGD), COMBINED N/A 6/1/2018    Procedure: COMBINED ESOPHAGOSCOPY, GASTROSCOPY, DUODENOSCOPY (EGD), REMOVE FOREIGN BODY;  COMBINED ESOPHAGOSCOPY, GASTROSCOPY, DUODENOSCOPY with removal of foreign body, propofol sedation by anesthesia;  Surgeon: Brice Martinez MD;  Location:  GI     ESOPHAGOSCOPY, GASTROSCOPY, DUODENOSCOPY (EGD), COMBINED N/A 7/25/2018    Procedure: COMBINED ESOPHAGOSCOPY, GASTROSCOPY, DUODENOSCOPY (EGD), REMOVE FOREIGN BODY;;  Surgeon: Candy Castelan MD;  Location:  GI     ESOPHAGOSCOPY, GASTROSCOPY, DUODENOSCOPY (EGD), COMBINED N/A 7/28/2018    Procedure: COMBINED ESOPHAGOSCOPY, GASTROSCOPY, DUODENOSCOPY (EGD), REMOVE FOREIGN BODY;  COMBINED ESOPHAGOSCOPY, GASTROSCOPY, DUODENOSCOPY (EGD), REMOVE FOREIGN BODY;  Surgeon: Brice Guzmán MD;  [facility]     ESOPHAGOSCOPY, GASTROSCOPY, DUODENOSCOPY (EGD), COMBINED N/A 7/31/2018    Procedure: COMBINED ESOPHAGOSCOPY, GASTROSCOPY, DUODENOSCOPY (EGD);  COMBINED ESOPHAGOSCOPY, GASTROSCOPY, DUODENOSCOPY (EGD) TO REMOVE FOREIGN BODY;  Surgeon: Lokesh Paula MD;  [facility]     ESOPHAGOSCOPY, GASTROSCOPY, DUODENOSCOPY (EGD), COMBINED N/A 8/4/2018    Procedure: COMBINED ESOPHAGOSCOPY, GASTROSCOPY, DUODENOSCOPY (EGD), REMOVE FOREIGN BODY;   combined esophagoscopy, gastroscopy, duodenoscopy, REMOVE FOREIGN BODY ;  Surgeon: Lokesh Paula MD;  [facility]     ESOPHAGOSCOPY, GASTROSCOPY, DUODENOSCOPY (EGD), COMBINED N/A 10/6/2019    Procedure: ESOPHAGOGASTRODUODENOSCOPY (EGD) with fireign body removal x2, esophageal stent placement with floroscopy;  Surgeon: Timoteo Espana MD;  [facility]     ESOPHAGOSCOPY, GASTROSCOPY, DUODENOSCOPY (EGD), COMBINED N/A 12/2/2019    Procedure: Esophagogastroduodenoscopy with esophageal stent removal, esophogram;  Surgeon: Kailee Tena MD;  [facility]     ESOPHAGOSCOPY, GASTROSCOPY, DUODENOSCOPY (EGD), COMBINED N/A  12/17/2019    Procedure: ESOPHAGOGASTRODUODENOSCOPY, WITH FOREIGN BODY REMOVAL;  Surgeon: Pamela Perez MD;  [facility]     ESOPHAGOSCOPY, GASTROSCOPY, DUODENOSCOPY (EGD), COMBINED N/A 12/13/2019    Procedure: ESOPHAGOGASTRODUODENOSCOPY, WITH FOREIGN BODY REMOVAL;  Surgeon: Samia Stanton MD;  [facility]     ESOPHAGOSCOPY, GASTROSCOPY, DUODENOSCOPY (EGD), COMBINED N/A 12/28/2019    Procedure: ESOPHAGOGASTRODUODENOSCOPY (EGD) Removal of Foreign Body X 2;  Surgeon: Huy Kelley MD;  [facility]     ESOPHAGOSCOPY, GASTROSCOPY, DUODENOSCOPY (EGD), COMBINED N/A 1/5/2020    Procedure: ESOPHAGOGASTRODUOENOSCOPY WITH FOREIGN BODY REMOVAL;  Surgeon: Pamela Perez MD;  [facility]     ESOPHAGOSCOPY, GASTROSCOPY, DUODENOSCOPY (EGD), COMBINED N/A 1/3/2020    Procedure: ESOPHAGOGASTRODUODENOSCOPY (EGD) REMOVAL OF FOREIGN BODY.;  Surgeon: Pamela Perez MD;  [facility]     ESOPHAGOSCOPY, GASTROSCOPY, DUODENOSCOPY (EGD), COMBINED N/A 1/13/2020    Procedure: ESOPHAGOGASTRODUODENOSCOPY (EGD) for foreign body removal;  Surgeon: Lokesh Paula MD;  [facility]     ESOPHAGOSCOPY, GASTROSCOPY, DUODENOSCOPY (EGD), COMBINED N/A 1/18/2020    Procedure: Diagnostic ESOPHAGOGASTRODUODENOSCOPY (EGD;  Surgeon: Lokesh Paula MD;  [facility]     ESOPHAGOSCOPY, GASTROSCOPY, DUODENOSCOPY (EGD), COMBINED N/A 3/29/2020    Procedure: UPPER ENDOSCOPY WITH FOREIGN BODY REMOVAL;  Surgeon: Doug Hansen MD;  [facility]     ESOPHAGOSCOPY, GASTROSCOPY, DUODENOSCOPY (EGD), COMBINED N/A 7/11/2020    Procedure: ESOPHAGOGASTRODUODENOSCOPY (EGD); Upper Endoscopy WITH FOREIGN BODY REMOVAL;  Surgeon: McBeath, Lyndsey, DO;  [facility]     ESOPHAGOSCOPY, GASTROSCOPY, DUODENOSCOPY (EGD), COMBINED N/A 7/29/2020    Procedure: ESOPHAGOGASTRODUODENOSCOPY REMOVAL OF FOREIGN BODY;  Surgeon: Samia Stanton MD;  [facility]     ESOPHAGOSCOPY,  GASTROSCOPY, DUODENOSCOPY (EGD), COMBINED N/A 8/1/2020    Procedure: ESOPHAGOGASTRODUODENOSCOPY, WITH FOREIGN BODY REMOVAL;  Surgeon: Pamela Perez MD;  [facility]     ESOPHAGOSCOPY, GASTROSCOPY, DUODENOSCOPY (EGD), COMBINED N/A 8/18/2020    Procedure: ESOPHAGOGASTRODUODENOSCOPY (EGD) for foreign body removal;  Surgeon: Pamela Perez MD;  [facility]     ESOPHAGOSCOPY, GASTROSCOPY, DUODENOSCOPY (EGD), COMBINED N/A 8/27/2020    Procedure: ESOPHAGOGASTRODUODENOSCOPY (EGD) with foreign body removal;  Surgeon: Campbell Rogers MD;  [facility]     EXAM UNDER ANESTHESIA ANUS N/A 1/10/2017    Procedure: EXAM UNDER ANESTHESIA ANUS;  Surgeon: Annmarie Haynes MD;  [facility]     EXAM UNDER ANESTHESIA RECTUM N/A 7/19/2018    Procedure: EXAM UNDER ANESTHESIA RECTUM;  EXAM UNDER ANESTHESIA, REMOVAL OF RECTAL FOREIGN BODY;  Surgeon: Annmarie Haynes MD;  [facility]     HC REMOVE FECAL IMPACTION OR FB W ANESTHESIA N/A 12/18/2016    Procedure: REMOVE FECAL IMPACTION/FOREIGN BODY UNDER ANESTHESIA;  Surgeon: Soham Cano MD;  Location: [facility] OR     KNEE SURGERY - removed a small tissue mass from knee Right      LAPAROSCOPIC ABLATION ENDOMETRIOSIS       LAPAROTOMY EXPLORATORY N/A 1/10/2017    Procedure: LAPAROTOMY EXPLORATORY;  Surgeon: Annmarie Hayens MD;  [facility]     LAPAROTOMY EXPLORATORY  09/11/2019    Manual manipulation of bowel to remove pill bottle in rectum     lymph nodes removed from neck; benign  age 7     MAMMOPLASTY REDUCTION Bilateral      RELEASE CARPAL TUNNEL Bilateral      SIGMOIDOSCOPY FLEXIBLE N/A 1/10/2017    Procedure: SIGMOIDOSCOPY FLEXIBLE;  Surgeon: Annmarie Haynes MD;  [facility]     SIGMOIDOSCOPY FLEXIBLE N/A 5/8/2018    Procedure: SIGMOIDOSCOPY FLEXIBLE;  flex sig with foreign body removal using snare and rattooth forcep;  Surgeon: Soham Cano MD;  Location:  GI      SIGMOIDOSCOPY FLEXIBLE N/A 2/20/2019    Procedure: Exam under anesthesia Colonoscopy with attempted  removal of rectal foreign body;  Surgeon: Estrada Chávez MD;  [facility]       Past medical history, past surgical history, medications, and allergies were reviewed with the patient. Additional pertinent items: None    FAMILY HISTORY:   Family History   Problem Relation Age of Onset     Diabetes Type 2  Maternal Grandmother      Diabetes Type 2  Paternal Grandmother      Breast Cancer Paternal Grandmother      Cerebrovascular Disease Father 53     Myocardial Infarction No family hx of      Coronary Artery Disease Early Onset No family hx of      Ovarian Cancer No family hx of      Colon Cancer No family hx of        SOCIAL HISTORY:   Social History     Tobacco Use     Smoking status: Never Smoker     Smokeless tobacco: Never Used   Substance Use Topics     Alcohol use: No     Alcohol/week: 0.0 standard drinks     Social history was reviewed with the patient. Additional pertinent items: None      Patient's Medications   New Prescriptions    No medications on file   Previous Medications    ACETAMINOPHEN (TYLENOL) 32 MG/ML LIQUID    Take 31.25 mLs (1,000 mg) by mouth every 6 hours as needed for fever or pain    ALBUTEROL (PROAIR HFA/PROVENTIL HFA/VENTOLIN HFA) 108 (90 BASE) MCG/ACT INHALER    Inhale 2 puffs into the lungs as needed for shortness of breath / dyspnea or wheezing    BUSPIRONE (BUSPAR) 15 MG TABLET    Take 1 tablet (15 mg) by mouth 2 times daily    CHOLECALCIFEROL (VITAMIN D) 50 MCG (2000 UT) CAPS    Take 2,000 Units by mouth daily     CLONIDINE (CATAPRES) 0.1 MG TABLET    Take 0.1 mg by mouth 2 times daily     DESVENLAFAXINE (PRISTIQ) 100 MG 24 HR TABLET    Take 1 tablet (100 mg) by mouth every morning    DOCOSANOL (ABREVA) 10 % CREA CREAM    Apply to lip 5 times a day as soon as symptoms begin, do not use for more than 10 days. Used PRN.    HYDROXYCHLOROQUINE (PLAQUENIL) 200 MG TABLET    Take 200 mg  by mouth 2 times daily    HYDROXYZINE (ATARAX) 50 MG TABLET    Take 1 tablet (50 mg) by mouth 3 times daily as needed for anxiety    LURASIDONE (LATUDA) 60 MG TABS TABLET    Take 1 tablet (60 mg) by mouth at bedtime    METFORMIN (GLUCOPHAGE-XR) 500 MG 24 HR TABLET    Take 1,000 mg by mouth Take 1 tablet (500 mg) by mouth daily     NALTREXONE (DEPADE/REVIA) 50 MG TABLET    Take 1 tablet (50 mg) by mouth every evening    NORETHINDRONE (MICRONOR) 0.35 MG TABLET    Take 0.35 mg by mouth daily    ONDANSETRON (ZOFRAN-ODT) 4 MG ODT TAB    Take 4 mg by mouth every 6 hours as needed for nausea or vomiting     PANTOPRAZOLE (PROTONIX) 40 MG EC TABLET    Take 1 tablet (40 mg) by mouth daily    PREGABALIN (LYRICA) 50 MG CAPSULE    Take 50 mg by mouth 3 times daily    PRENATAL VIT-FE FUMARATE-FA (PNV PRENATAL PLUS MULTIVITAMIN) 27-1 MG TABS PER TABLET    Take 1 tablet by mouth daily    TOPIRAMATE (TOPAMAX) 100 MG TABLET    Take 0.5 tablets (50 mg) by mouth daily Take 1 tablet (100 mg) by mouth daily at bedtime   Modified Medications    No medications on file   Discontinued Medications    No medications on file          Allergies   Allergen Reactions     Amoxicillin-Pot Clavulanate Other (See Comments), Swelling and Rash     PN: facial swelling, left side. Also had cortisone injection the same day as she started the Augmentin.  Noted in downtime recovery of chart.    PN: facial swelling, left side. Also had cortisone injection the same day as she started the Augmentin.; HUT Comment: PN: facial swelling, left side. Also had cortisone injection the same day as she started the Augmentin.Noted in downtime recovery of chart.; HUT Reaction: Rash; HUT Reaction: Unknown; HUT Reaction Type: Allergy; HUT Severity: Med; HUT Noted: [id number]     Oseltamivir Hives     med stopped, PN: med stopped  med stopped, PN: med stopped; HUT Comment: med stopped, PN: med stopped; HUT Reaction: Hives; HUT Reaction Type: Allergy; HUT Severity: Med; HUT  "Noted: [id number]     Penicillins Anaphylaxis     HUT Reaction: Anaphylaxis; HUT Reaction Type: Allergy; HUT Severity: High; HUT Noted: 20150904     Vancomycin Itching, Swelling and Rash     Other reaction(s): Redness  Flushed face, very itchy; HUT Comment: Flushed face, very itchy; HUT Reaction: Angioedema; HUT Reaction: Redness; HUT Severity: Med; HUT Noted: [id number]    facial     Hydrocodone Nausea and Vomiting and GI Disturbance     vomiting for days, PN: vomiting for days; HUT Comment: vomiting for days; HUT Reaction: Gastrointestinal; HUT Reaction: Nausea And Vomiting; HUT Reaction Type: Intolerance; HUT Severity: Med; HUT Noted: 20141211  vomiting for days       Blood-Group Specific Substance Other (See Comments)     Patient has an anti-Cw and non-specific antibodies. Blood product orders may be delayed. Draw one red top and two purple top tubes for all type/screen/crossmatch orders.  Patient has anti-Cw, anti-K ([place]), Warm auto and nonspecific antibodies. Blood products may be delayed. Draw patient 24 hours prior to transfusion. Draw one red top and two purple top tubes for all type and screen orders.     Cephalosporins Rash     Influenza Vaccines Other (See Comments) and Nausea and Vomiting     HUT Reaction: Nausea And Vomiting; HUT Reaction Type: Intolerance; HUT Severity: Low; HUT Noted: 20170416     Lamotrigine Rash     Possibly associated with Lamictal.   HUT Comment: Possibly associated with Lamictal. ; HUT Reaction: Rash; HUT Reaction Type: Allergy; HUT Severity: Low; HUT Noted: 20180307     Latex Rash     HUT Reaction: Rash; HUT Reaction Type: Allergy; HUT Severity: Low; HUT Noted: [id number]        Review of Systems     ROS: 14 point ROS neg other than the symptoms noted above in the HPI.    Physical Exam   BP: (!) 151/86  Pulse: 95  Temp: 98.7  F (37.1  C)  Resp: 16  Height: 157.5 cm (5' 2\")  Weight: 121.2 kg (267 lb 3.2 oz)(scale)  SpO2: 96 %      Physical Exam  Constitutional:       General: She is " not in acute distress.     Appearance: She is well-developed. She is obese. She is not diaphoretic.   HENT:      Head: Normocephalic and atraumatic.      Mouth/Throat:      Pharynx: No oropharyngeal exudate.   Eyes:      General: No scleral icterus.        Right eye: No discharge.         Left eye: No discharge.      Pupils: Pupils are equal, round, and reactive to light.   Neck:      Musculoskeletal: Normal range of motion and neck supple. No crepitus.      Trachea: Phonation normal. No abnormal tracheal secretions.     Cardiovascular:      Rate and Rhythm: Normal rate and regular rhythm.      Heart sounds: Normal heart sounds. No murmur. No friction rub. No gallop.    Pulmonary:      Effort: Pulmonary effort is normal. No respiratory distress.      Breath sounds: Normal breath sounds. No wheezing.   Chest:      Chest wall: No tenderness.   Abdominal:      General: Bowel sounds are normal. There is no distension.      Palpations: Abdomen is soft.      Tenderness: There is no abdominal tenderness.   Musculoskeletal: Normal range of motion.         General: No tenderness or deformity.   Skin:     General: Skin is warm and dry.      Coloration: Skin is not pale.      Findings: No erythema or rash.   Neurological:      Mental Status: She is alert and oriented to person, place, and time.      Cranial Nerves: No cranial nerve deficit.         ED Course        Procedures                           No results found for this or any previous visit (from the past 24 hour(s)).  Medications - No data to display          Assessments & Plan (with Medical Decision Making)   This is a 29-year-old female with a history of swallowing foreign objects with recent endoscopy presents with throat pain on swallowing.  This began yesterday afternoon.  She had been previously recovering well from endoscopy.  She also notes pain in her throat with turning her head and pressing on the left side of her neck.  No other symptoms associated with  this.  On exam, patient has no difficulty with airway or phonation.  She has minimal tenderness on her left anterior neck.  I discussed the case with GI.  We obtained a CT of her neck as well as chest which shows no signs of esophageal perforation.  GI recommends a viscous lidocaine for symptomatic relief.  They state that repeat endoscopy would not be of benefit and would likely put the patient at increased risk.  Patient was able to eat and drink in the emergency department.  Will provide a short course of viscous lidocaine for symptomatic relief.  Will discharge home with return precautions. Discussed reasons to return to the emergency department.  Patient understands and agrees with this plan.    I have reviewed the nursing notes.    I have reviewed the findings, diagnosis, plan and need for follow up with the patient.    New Prescriptions    No medications on file       Final diagnoses:   None       8/31/2020   [facility], [place], EMERGENCY DEPARTMENT     Dejon Rodriguez,   08/31/20 1958

## 2020-09-05 ENCOUNTER — APPOINTMENT (OUTPATIENT)
Dept: CT IMAGING | Facility: CLINIC | Age: 30
End: 2020-09-05
Attending: INTERNAL MEDICINE
Payer: COMMERCIAL

## 2020-09-05 ENCOUNTER — HOSPITAL ENCOUNTER (EMERGENCY)
Facility: CLINIC | Age: 30
Discharge: HOME | End: 2020-09-05
Attending: INTERNAL MEDICINE | Admitting: INTERNAL MEDICINE
Payer: COMMERCIAL

## 2020-09-05 ENCOUNTER — APPOINTMENT (OUTPATIENT)
Dept: GENERAL RADIOLOGY | Facility: CLINIC | Age: 30
End: 2020-09-05
Attending: INTERNAL MEDICINE
Payer: COMMERCIAL

## 2020-09-05 VITALS
HEIGHT: 62 IN | TEMPERATURE: 98.3 F | SYSTOLIC BLOOD PRESSURE: 136 MMHG | RESPIRATION RATE: 16 BRPM | OXYGEN SATURATION: 98 % | DIASTOLIC BLOOD PRESSURE: 85 MMHG | BODY MASS INDEX: 49.13 KG/M2 | HEART RATE: 89 BPM | WEIGHT: 267 LBS

## 2020-09-05 DIAGNOSIS — R07.1 CHEST PAIN ON BREATHING: ICD-10-CM

## 2020-09-05 LAB
ALBUMIN SERPL-MCNC: 2.8 G/DL (ref 3.4–5)
ALBUMIN UR-MCNC: 10 MG/DL
ALP SERPL-CCNC: 70 U/L (ref 40–150)
ALT SERPL W P-5'-P-CCNC: 26 U/L (ref 0–50)
AMPHETAMINES UR QL SCN: NEGATIVE
ANION GAP SERPL CALCULATED.3IONS-SCNC: 3 MMOL/L (ref 3–14)
APPEARANCE UR: CLEAR
AST SERPL W P-5'-P-CCNC: 15 U/L (ref 0–45)
BARBITURATES UR QL: NEGATIVE
BASOPHILS # BLD AUTO: 0 10E9/L (ref 0–0.2)
BASOPHILS NFR BLD AUTO: 0.3 %
BENZODIAZ UR QL: NEGATIVE
BILIRUB SERPL-MCNC: 0.1 MG/DL (ref 0.2–1.3)
BILIRUB UR QL STRIP: NEGATIVE
BUN SERPL-MCNC: 11 MG/DL (ref 7–30)
CALCIUM SERPL-MCNC: 8.4 MG/DL (ref 8.5–10.1)
CANNABINOIDS UR QL SCN: NEGATIVE
CHLORIDE SERPL-SCNC: 109 MMOL/L (ref 94–109)
CO2 SERPL-SCNC: 26 MMOL/L (ref 20–32)
COCAINE UR QL: NEGATIVE
COLOR UR AUTO: YELLOW
CREAT SERPL-MCNC: 0.59 MG/DL (ref 0.52–1.04)
CRP SERPL-MCNC: 22 MG/L (ref 0–8)
D DIMER PPP FEU-MCNC: 0.8 UG/ML FEU (ref 0–0.5)
DIFFERENTIAL METHOD BLD: ABNORMAL
EOSINOPHIL # BLD AUTO: 0.2 10E9/L (ref 0–0.7)
EOSINOPHIL NFR BLD AUTO: 2.8 %
ERYTHROCYTE [DISTWIDTH] IN BLOOD BY AUTOMATED COUNT: 14.8 % (ref 10–15)
ETHANOL UR QL SCN: NEGATIVE
GFR SERPL CREATININE-BSD FRML MDRD: >90 ML/MIN/{1.73_M2}
GLUCOSE SERPL-MCNC: 107 MG/DL (ref 70–99)
GLUCOSE UR STRIP-MCNC: NEGATIVE MG/DL
HCT VFR BLD AUTO: 34.2 % (ref 35–47)
HGB BLD-MCNC: 10.8 G/DL (ref 11.7–15.7)
HGB UR QL STRIP: NEGATIVE
IMM GRANULOCYTES # BLD: 0 10E9/L (ref 0–0.4)
IMM GRANULOCYTES NFR BLD: 0.3 %
INR PPP: 1.06 (ref 0.86–1.14)
KETONES UR STRIP-MCNC: NEGATIVE MG/DL
LEUKOCYTE ESTERASE UR QL STRIP: NEGATIVE
LIPASE SERPL-CCNC: 152 U/L (ref 73–393)
LYMPHOCYTES # BLD AUTO: 1.6 10E9/L (ref 0.8–5.3)
LYMPHOCYTES NFR BLD AUTO: 25.6 %
MCH RBC QN AUTO: 28.3 PG (ref 26.5–33)
MCHC RBC AUTO-ENTMCNC: 31.6 G/DL (ref 31.5–36.5)
MCV RBC AUTO: 90 FL (ref 78–100)
MONOCYTES # BLD AUTO: 0.6 10E9/L (ref 0–1.3)
MONOCYTES NFR BLD AUTO: 9.4 %
MUCOUS THREADS #/AREA URNS LPF: PRESENT /LPF
NEUTROPHILS # BLD AUTO: 3.8 10E9/L (ref 1.6–8.3)
NEUTROPHILS NFR BLD AUTO: 61.6 %
NITRATE UR QL: NEGATIVE
NRBC # BLD AUTO: 0 10*3/UL
NRBC BLD AUTO-RTO: 0 /100
NT-PROBNP SERPL-MCNC: 47 PG/ML (ref 0–450)
OPIATES UR QL SCN: NEGATIVE
PH UR STRIP: 6 PH (ref 5–7)
PLATELET # BLD AUTO: 243 10E9/L (ref 150–450)
POTASSIUM SERPL-SCNC: 3.7 MMOL/L (ref 3.4–5.3)
PROT SERPL-MCNC: 8.1 G/DL (ref 6.8–8.8)
RBC # BLD AUTO: 3.81 10E12/L (ref 3.8–5.2)
RBC #/AREA URNS AUTO: <1 /HPF (ref 0–2)
SODIUM SERPL-SCNC: 138 MMOL/L (ref 133–144)
SOURCE: ABNORMAL
SP GR UR STRIP: 1.01 (ref 1–1.03)
SQUAMOUS #/AREA URNS AUTO: <1 /HPF (ref 0–1)
TROPONIN I SERPL-MCNC: <0.015 UG/L (ref 0–0.04)
UROBILINOGEN UR STRIP-MCNC: NORMAL MG/DL (ref 0–2)
WBC # BLD AUTO: 6.2 10E9/L (ref 4–11)
WBC #/AREA URNS AUTO: 0 /HPF (ref 0–5)

## 2020-09-05 PROCEDURE — 84484 ASSAY OF TROPONIN QUANT: CPT | Performed by: INTERNAL MEDICINE

## 2020-09-05 PROCEDURE — 93005 ELECTROCARDIOGRAM TRACING: CPT | Performed by: INTERNAL MEDICINE

## 2020-09-05 PROCEDURE — 93010 ELECTROCARDIOGRAM REPORT: CPT | Mod: Z6 | Performed by: INTERNAL MEDICINE

## 2020-09-05 PROCEDURE — 96374 THER/PROPH/DIAG INJ IV PUSH: CPT | Mod: 59 | Performed by: INTERNAL MEDICINE

## 2020-09-05 PROCEDURE — 85379 FIBRIN DEGRADATION QUANT: CPT | Performed by: INTERNAL MEDICINE

## 2020-09-05 PROCEDURE — 71045 X-RAY EXAM CHEST 1 VIEW: CPT

## 2020-09-05 PROCEDURE — 96375 TX/PRO/DX INJ NEW DRUG ADDON: CPT | Mod: 59 | Performed by: INTERNAL MEDICINE

## 2020-09-05 PROCEDURE — 80320 DRUG SCREEN QUANTALCOHOLS: CPT | Performed by: INTERNAL MEDICINE

## 2020-09-05 PROCEDURE — 96376 TX/PRO/DX INJ SAME DRUG ADON: CPT | Performed by: INTERNAL MEDICINE

## 2020-09-05 PROCEDURE — 99285 EMERGENCY DEPT VISIT HI MDM: CPT | Mod: 25 | Performed by: INTERNAL MEDICINE

## 2020-09-05 PROCEDURE — 25000128 H RX IP 250 OP 636: Performed by: INTERNAL MEDICINE

## 2020-09-05 PROCEDURE — 81001 URINALYSIS AUTO W/SCOPE: CPT | Performed by: INTERNAL MEDICINE

## 2020-09-05 PROCEDURE — 80307 DRUG TEST PRSMV CHEM ANLYZR: CPT | Performed by: INTERNAL MEDICINE

## 2020-09-05 PROCEDURE — 86140 C-REACTIVE PROTEIN: CPT | Performed by: INTERNAL MEDICINE

## 2020-09-05 PROCEDURE — 85610 PROTHROMBIN TIME: CPT | Performed by: INTERNAL MEDICINE

## 2020-09-05 PROCEDURE — 83690 ASSAY OF LIPASE: CPT | Performed by: INTERNAL MEDICINE

## 2020-09-05 PROCEDURE — 25000128 H RX IP 250 OP 636: Performed by: STUDENT IN AN ORGANIZED HEALTH CARE EDUCATION/TRAINING PROGRAM

## 2020-09-05 PROCEDURE — 71275 CT ANGIOGRAPHY CHEST: CPT

## 2020-09-05 PROCEDURE — 85025 COMPLETE CBC W/AUTO DIFF WBC: CPT | Performed by: INTERNAL MEDICINE

## 2020-09-05 PROCEDURE — 80053 COMPREHEN METABOLIC PANEL: CPT | Performed by: INTERNAL MEDICINE

## 2020-09-05 PROCEDURE — 83880 ASSAY OF NATRIURETIC PEPTIDE: CPT | Performed by: INTERNAL MEDICINE

## 2020-09-05 RX ORDER — HYDROMORPHONE HYDROCHLORIDE 1 MG/ML
0.5 INJECTION, SOLUTION INTRAMUSCULAR; INTRAVENOUS; SUBCUTANEOUS ONCE
Status: COMPLETED | OUTPATIENT
Start: 2020-09-05 | End: 2020-09-05

## 2020-09-05 RX ORDER — HEPARIN SODIUM (PORCINE) LOCK FLUSH IV SOLN 100 UNIT/ML 100 UNIT/ML
100 SOLUTION INTRAVENOUS ONCE
Status: COMPLETED | OUTPATIENT
Start: 2020-09-05 | End: 2020-09-05

## 2020-09-05 RX ORDER — ONDANSETRON 2 MG/ML
4 INJECTION INTRAMUSCULAR; INTRAVENOUS ONCE
Status: COMPLETED | OUTPATIENT
Start: 2020-09-05 | End: 2020-09-05

## 2020-09-05 RX ORDER — DIPHENHYDRAMINE HYDROCHLORIDE 50 MG/ML
25 INJECTION INTRAMUSCULAR; INTRAVENOUS ONCE
Status: COMPLETED | OUTPATIENT
Start: 2020-09-05 | End: 2020-09-05

## 2020-09-05 RX ORDER — IOPAMIDOL 755 MG/ML
75 INJECTION, SOLUTION INTRAVASCULAR ONCE
Status: COMPLETED | OUTPATIENT
Start: 2020-09-05 | End: 2020-09-05

## 2020-09-05 RX ADMIN — HYDROMORPHONE HYDROCHLORIDE 0.5 MG: 1 INJECTION, SOLUTION INTRAMUSCULAR; INTRAVENOUS; SUBCUTANEOUS at 17:53

## 2020-09-05 RX ADMIN — HEPARIN 100 UNITS: 100 SYRINGE at 21:13

## 2020-09-05 RX ADMIN — DIPHENHYDRAMINE HYDROCHLORIDE 25 MG: 50 INJECTION, SOLUTION INTRAMUSCULAR; INTRAVENOUS at 19:37

## 2020-09-05 RX ADMIN — ONDANSETRON 4 MG: 2 INJECTION INTRAMUSCULAR; INTRAVENOUS at 16:54

## 2020-09-05 RX ADMIN — HYDROMORPHONE HYDROCHLORIDE 0.5 MG: 1 INJECTION, SOLUTION INTRAMUSCULAR; INTRAVENOUS; SUBCUTANEOUS at 16:54

## 2020-09-05 RX ADMIN — IOPAMIDOL 75 ML: 755 INJECTION, SOLUTION INTRAVENOUS at 18:39

## 2020-09-05 ASSESSMENT — ENCOUNTER SYMPTOMS
FEVER: 0
CHILLS: 0
COUGH: 0
PALPITATIONS: 0
TROUBLE SWALLOWING: 0
WHEEZING: 0
BACK PAIN: 0
LIGHT-HEADEDNESS: 0
WEAKNESS: 0
CONFUSION: 0
HEADACHES: 0
NAUSEA: 1
DYSURIA: 0
ADENOPATHY: 0
SHORTNESS OF BREATH: 1
ABDOMINAL PAIN: 0
NUMBNESS: 0
NECK PAIN: 0
VOMITING: 0

## 2020-09-05 ASSESSMENT — MIFFLIN-ST. JEOR: SCORE: 1894.35

## 2020-09-05 NOTE — ED PROVIDER NOTES
[place] EMERGENCY DEPARTMENT ([facility])  9/05/20    History     Chief Complaint   Patient presents with     Chest Pain     HPI  Nevin Alvarado is a 29 year old female with a medical history notable for bipolar disorder, ADD, anorexia, PTSD and frequent admissions for foreign body ingestion presents for evaluation of chest pain.She states she had abrupt onset of left lateral chest pain which migrates toward the midline associated with nausea and mild shortness of breath. The pain is worse on inspiration and movement. She has no abdominal pain. She has had no fever, chills, URI symptoms, cough or wheezing. She has a history of past PE in her right lung, but has been off DOAC therapy since February. She has chronic ankle edema unchanged from baseline. She has no palpitations. She has a past history of SIB, foreign object insertions in the rectum and swallowed foreign bodies. She had esophageal perforation and stenting associated with foreign body removal about a year ago.    Past Medical History:   Diagnosis Date     ADD (attention deficit disorder)      Anorexia nervosa with bulimia     history of; on Topamax     Anxiety      Borderline personality disorder (H)      Depression      H/O self-harm      History of pulmonary embolism 12/2019    Provoked. Completed 3 month course of Apixaban     Morbid obesity (H)      Neuropathy      PTSD (post-traumatic stress disorder)      Rectal foreign body - Recurrent issue, self placed      Swallowed foreign body - Recurrent issue, self placed        Past Surgical History:   Procedure Laterality Date     COMBINED ESOPHAGOSCOPY, GASTROSCOPY, DUODENOSCOPY (EGD), REPLACE ESOPHAGEAL STENT N/A 10/9/2019    Procedure: Upper Endoscopy with Suture Placement;  Surgeon: Zurdo Ramirez MD;  [facility]     ESOPHAGOSCOPY, GASTROSCOPY, DUODENOSCOPY (EGD), COMBINED N/A 3/9/2017    Procedure: COMBINED ESOPHAGOSCOPY, GASTROSCOPY, DUODENOSCOPY (EGD), REMOVE FOREIGN  BODY;  Surgeon: Avis Guzmán MD;  [facility]     ESOPHAGOSCOPY, GASTROSCOPY, DUODENOSCOPY (EGD), COMBINED N/A 4/20/2017    Procedure: COMBINED ESOPHAGOSCOPY, GASTROSCOPY, DUODENOSCOPY (EGD), REMOVE FOREIGN BODY;  EGD removal Foregin body;  Surgeon: Lokesh Paula MD;  [facility]     ESOPHAGOSCOPY, GASTROSCOPY, DUODENOSCOPY (EGD), COMBINED N/A 6/12/2017    Procedure: COMBINED ESOPHAGOSCOPY, GASTROSCOPY, DUODENOSCOPY (EGD);  COMBINED ESOPHAGOSCOPY, GASTROSCOPY, DUODENOSCOPY (EGD) [[id number]]attempted removal of foreign body;  Surgeon: Pamela Perez MD;  [facility]     ESOPHAGOSCOPY, GASTROSCOPY, DUODENOSCOPY (EGD), COMBINED N/A 6/9/2017    Procedure: COMBINED ESOPHAGOSCOPY, GASTROSCOPY, DUODENOSCOPY (EGD), REMOVE FOREIGN BODY;  Esophagoscopy, Gastroscopy, Duodenoscopy, Removal of Foreign Body;  Surgeon: Dejon Marsh MD;  [facility]     ESOPHAGOSCOPY, GASTROSCOPY, DUODENOSCOPY (EGD), COMBINED N/A 1/6/2018    Procedure: COMBINED ESOPHAGOSCOPY, GASTROSCOPY, DUODENOSCOPY (EGD), REMOVE FOREIGN BODY;  COMBINED ESOPHAGOSCOPY, GASTROSCOPY, DUODENOSCOPY (EGD) [by pascal net and snare with profol sedation;  Surgeon: Feliciano Emmanuel MD;  Location:  GI     ESOPHAGOSCOPY, GASTROSCOPY, DUODENOSCOPY (EGD), COMBINED N/A 3/19/2018    Procedure: COMBINED ESOPHAGOSCOPY, GASTROSCOPY, DUODENOSCOPY (EGD), REMOVE FOREIGN BODY;   Esophagodscopy, Gastroscopy, Duodenoscopy,Foreign Body Removal;  Surgeon: Brice Guzmán MD;  [facility]     ESOPHAGOSCOPY, GASTROSCOPY, DUODENOSCOPY (EGD), COMBINED N/A 4/16/2018    Procedure: COMBINED ESOPHAGOSCOPY, GASTROSCOPY, DUODENOSCOPY (EGD), REMOVE FOREIGN BODY;  Esophagogastroduodenoscopy  Foreign Body Removal X 2;  Surgeon: Royer Moise MD;  [facility]     ESOPHAGOSCOPY, GASTROSCOPY, DUODENOSCOPY (EGD), COMBINED N/A 6/1/2018    Procedure: COMBINED ESOPHAGOSCOPY, GASTROSCOPY, DUODENOSCOPY (EGD), REMOVE  FOREIGN BODY;  COMBINED ESOPHAGOSCOPY, GASTROSCOPY, DUODENOSCOPY with removal of foreign body, propofol sedation by anesthesia;  Surgeon: Brice Martinez MD;  Location:  GI     ESOPHAGOSCOPY, GASTROSCOPY, DUODENOSCOPY (EGD), COMBINED N/A 7/25/2018    Procedure: COMBINED ESOPHAGOSCOPY, GASTROSCOPY, DUODENOSCOPY (EGD), REMOVE FOREIGN BODY;;  Surgeon: Candy Castelan MD;  Location:  GI     ESOPHAGOSCOPY, GASTROSCOPY, DUODENOSCOPY (EGD), COMBINED N/A 7/28/2018    Procedure: COMBINED ESOPHAGOSCOPY, GASTROSCOPY, DUODENOSCOPY (EGD), REMOVE FOREIGN BODY;  COMBINED ESOPHAGOSCOPY, GASTROSCOPY, DUODENOSCOPY (EGD), REMOVE FOREIGN BODY;  Surgeon: Brice Guzmán MD;  [facility]     ESOPHAGOSCOPY, GASTROSCOPY, DUODENOSCOPY (EGD), COMBINED N/A 7/31/2018    Procedure: COMBINED ESOPHAGOSCOPY, GASTROSCOPY, DUODENOSCOPY (EGD);  COMBINED ESOPHAGOSCOPY, GASTROSCOPY, DUODENOSCOPY (EGD) TO REMOVE FOREIGN BODY;  Surgeon: Lokesh Paula MD;  [facility]     ESOPHAGOSCOPY, GASTROSCOPY, DUODENOSCOPY (EGD), COMBINED N/A 8/4/2018    Procedure: COMBINED ESOPHAGOSCOPY, GASTROSCOPY, DUODENOSCOPY (EGD), REMOVE FOREIGN BODY;   combined esophagoscopy, gastroscopy, duodenoscopy, REMOVE FOREIGN BODY ;  Surgeon: Lokesh Paula MD;  [facility]     ESOPHAGOSCOPY, GASTROSCOPY, DUODENOSCOPY (EGD), COMBINED N/A 10/6/2019    Procedure: ESOPHAGOGASTRODUODENOSCOPY (EGD) with fireign body removal x2, esophageal stent placement with floroscopy;  Surgeon: Timoteo Espana MD;  [facility]     ESOPHAGOSCOPY, GASTROSCOPY, DUODENOSCOPY (EGD), COMBINED N/A 12/2/2019    Procedure: Esophagogastroduodenoscopy with esophageal stent removal, esophogram;  Surgeon: Kailee Tena MD;  [facility]     ESOPHAGOSCOPY, GASTROSCOPY, DUODENOSCOPY (EGD), COMBINED N/A 12/17/2019    Procedure: ESOPHAGOGASTRODUODENOSCOPY, WITH FOREIGN BODY REMOVAL;  Surgeon: Pamela Perez MD;  Location:  OR      ESOPHAGOSCOPY, GASTROSCOPY, DUODENOSCOPY (EGD), COMBINED N/A 12/13/2019    Procedure: ESOPHAGOGASTRODUODENOSCOPY, WITH FOREIGN BODY REMOVAL;  Surgeon: Samia Stanton MD;  [facility]     ESOPHAGOSCOPY, GASTROSCOPY, DUODENOSCOPY (EGD), COMBINED N/A 12/28/2019    Procedure: ESOPHAGOGASTRODUODENOSCOPY (EGD) Removal of Foreign Body X 2;  Surgeon: Huy Kelley MD;  [facility]     ESOPHAGOSCOPY, GASTROSCOPY, DUODENOSCOPY (EGD), COMBINED N/A 1/5/2020    Procedure: ESOPHAGOGASTRODUOENOSCOPY WITH FOREIGN BODY REMOVAL;  Surgeon: Pamela Perez MD;  [facility]     ESOPHAGOSCOPY, GASTROSCOPY, DUODENOSCOPY (EGD), COMBINED N/A 1/3/2020    Procedure: ESOPHAGOGASTRODUODENOSCOPY (EGD) REMOVAL OF FOREIGN BODY.;  Surgeon: Pamela Perez MD;  [facility]     ESOPHAGOSCOPY, GASTROSCOPY, DUODENOSCOPY (EGD), COMBINED N/A 1/13/2020    Procedure: ESOPHAGOGASTRODUODENOSCOPY (EGD) for foreign body removal;  Surgeon: Lokesh Paula MD;  [facility]     ESOPHAGOSCOPY, GASTROSCOPY, DUODENOSCOPY (EGD), COMBINED N/A 1/18/2020    Procedure: Diagnostic ESOPHAGOGASTRODUODENOSCOPY (EGD;  Surgeon: Lokesh Paula MD;  [facility]     ESOPHAGOSCOPY, GASTROSCOPY, DUODENOSCOPY (EGD), COMBINED N/A 3/29/2020    Procedure: UPPER ENDOSCOPY WITH FOREIGN BODY REMOVAL;  Surgeon: Doug Hansen MD;  [facility]     ESOPHAGOSCOPY, GASTROSCOPY, DUODENOSCOPY (EGD), COMBINED N/A 7/11/2020    Procedure: ESOPHAGOGASTRODUODENOSCOPY (EGD); Upper Endoscopy WITH FOREIGN BODY REMOVAL;  Surgeon: Lyndsey Mendoza DO;  [facility]     ESOPHAGOSCOPY, GASTROSCOPY, DUODENOSCOPY (EGD), COMBINED N/A 7/29/2020    Procedure: ESOPHAGOGASTRODUODENOSCOPY REMOVAL OF FOREIGN BODY;  Surgeon: Samia Stanton MD;  [facility]     ESOPHAGOSCOPY, GASTROSCOPY, DUODENOSCOPY (EGD), COMBINED N/A 8/1/2020    Procedure: ESOPHAGOGASTRODUODENOSCOPY, WITH FOREIGN BODY REMOVAL;  Surgeon: Pamela Perez  MD Krystin;  [facility]     ESOPHAGOSCOPY, GASTROSCOPY, DUODENOSCOPY (EGD), COMBINED N/A 8/18/2020    Procedure: ESOPHAGOGASTRODUODENOSCOPY (EGD) for foreign body removal;  Surgeon: Pamela Perez MD;  [facility]     ESOPHAGOSCOPY, GASTROSCOPY, DUODENOSCOPY (EGD), COMBINED N/A 8/27/2020    Procedure: ESOPHAGOGASTRODUODENOSCOPY (EGD) with foreign body removal;  Surgeon: Campbell Rogers MD;  [facility]     EXAM UNDER ANESTHESIA ANUS N/A 1/10/2017    Procedure: EXAM UNDER ANESTHESIA ANUS;  Surgeon: Annmarie Haynes MD;  [facility]     EXAM UNDER ANESTHESIA RECTUM N/A 7/19/2018    Procedure: EXAM UNDER ANESTHESIA RECTUM;  EXAM UNDER ANESTHESIA, REMOVAL OF RECTAL FOREIGN BODY;  Surgeon: Annmarie Haynes MD;  [facility]     HC REMOVE FECAL IMPACTION OR FB W ANESTHESIA N/A 12/18/2016    Procedure: REMOVE FECAL IMPACTION/FOREIGN BODY UNDER ANESTHESIA;  Surgeon: Soham Cano MD;  Location: [facility]     KNEE SURGERY - removed a small tissue mass from knee Right      LAPAROSCOPIC ABLATION ENDOMETRIOSIS       LAPAROTOMY EXPLORATORY N/A 1/10/2017    Procedure: LAPAROTOMY EXPLORATORY;  Surgeon: Annmarie Haynes MD;  [facility]     LAPAROTOMY EXPLORATORY  09/11/2019    Manual manipulation of bowel to remove pill bottle in rectum     lymph nodes removed from neck; benign  age 7     MAMMOPLASTY REDUCTION Bilateral      RELEASE CARPAL TUNNEL Bilateral      SIGMOIDOSCOPY FLEXIBLE N/A 1/10/2017    Procedure: SIGMOIDOSCOPY FLEXIBLE;  Surgeon: Annmarie Haynes MD;  [facility]     SIGMOIDOSCOPY FLEXIBLE N/A 5/8/2018    Procedure: SIGMOIDOSCOPY FLEXIBLE;  flex sig with foreign body removal using snare and rattooth forcep;  Surgeon: Soham Cano MD;  Location:  GI     SIGMOIDOSCOPY FLEXIBLE N/A 2/20/2019    Procedure: Exam under anesthesia Colonoscopy with attempted  removal of rectal foreign body;  Surgeon: Estrada Chávez MD;   [facility]       Family History   Problem Relation Age of Onset     Diabetes Type 2  Maternal Grandmother      Diabetes Type 2  Paternal Grandmother      Breast Cancer Paternal Grandmother      Cerebrovascular Disease Father 53     Myocardial Infarction No family hx of      Coronary Artery Disease Early Onset No family hx of      Ovarian Cancer No family hx of      Colon Cancer No family hx of        Social History     Tobacco Use     Smoking status: Never Smoker     Smokeless tobacco: Never Used   Substance Use Topics     Alcohol use: No     Alcohol/week: 0.0 standard drinks     No current facility-administered medications for this encounter.      Current Outpatient Medications   Medication     acetaminophen (TYLENOL) 32 mg/mL liquid     albuterol (PROAIR HFA/PROVENTIL HFA/VENTOLIN HFA) 108 (90 Base) MCG/ACT inhaler     busPIRone (BUSPAR) 15 MG tablet     Cholecalciferol (VITAMIN D) 50 MCG (2000 UT) CAPS     cloNIDine (CATAPRES) 0.1 MG tablet     desvenlafaxine (PRISTIQ) 100 MG 24 hr tablet     docosanol (ABREVA) 10 % CREA cream     hydroxychloroquine (PLAQUENIL) 200 MG tablet     hydrOXYzine (ATARAX) 50 MG tablet     lidocaine (XYLOCAINE) 2 % solution     lurasidone (LATUDA) 60 MG TABS tablet     metFORMIN (GLUCOPHAGE-XR) 500 MG 24 hr tablet     naltrexone (DEPADE/REVIA) 50 MG tablet     norethindrone (MICRONOR) 0.35 MG tablet     ondansetron (ZOFRAN-ODT) 4 MG ODT tab     pantoprazole (PROTONIX) 40 MG EC tablet     pregabalin (LYRICA) 50 MG capsule     Prenatal Vit-Fe Fumarate-FA (PNV PRENATAL PLUS MULTIVITAMIN) 27-1 MG TABS per tablet     topiramate (TOPAMAX) 100 MG tablet        Allergies   Allergen Reactions     Amoxicillin-Pot Clavulanate Other (See Comments), Swelling and Rash     PN: facial swelling, left side. Also had cortisone injection the same day as she started the Augmentin.  Noted in downtime recovery of chart.    PN: facial swelling, left side. Also had cortisone injection the same day as she  started the Augmentin.; HUT Comment: PN: facial swelling, left side. Also had cortisone injection the same day as she started the Augmentin.Noted in downtime recovery of chart.; HUT Reaction: Rash; HUT Reaction: Unknown; HUT Reaction Type: Allergy; HUT Severity: Med; [facility] Noted: [id number]     Oseltamivir Hives     med stopped, PN: med stopped  med stopped, PN: med stopped; HUT Comment: med stopped, PN: med stopped; HUT Reaction: Hives; HUT Reaction Type: Allergy; HUT Severity: Med; [facility] Noted: [id number]     Penicillins Anaphylaxis     HUT Reaction: Anaphylaxis; HUT Reaction Type: Allergy; HUT Severity: High; [facility] Noted: [id number]     Vancomycin Itching, Swelling and Rash     Other reaction(s): Redness  Flushed face, very itchy; HUT Comment: Flushed face, very itchy; HUT Reaction: Angioedema; HUT Reaction: Redness; HUT Severity: Med; [facility] Noted: [id number]    facial     Hydrocodone Nausea and Vomiting and GI Disturbance     vomiting for days, PN: vomiting for days; HUT Comment: vomiting for days; HUT Reaction: Gastrointestinal; HUT Reaction: Nausea And Vomiting; HUT Reaction Type: Intolerance; HUT Severity: Med; [facility] Noted: [id number]  vomiting for days       Blood-Group Specific Substance Other (See Comments)     Patient has an anti-Cw and non-specific antibodies. Blood product orders may be delayed. Draw one red top and two purple top tubes for all type/screen/crossmatch orders.  Patient has anti-Cw, anti-K (Angella), Warm auto and nonspecific antibodies. Blood products may be delayed. Draw patient 24 hours prior to transfusion. Draw one red top and two purple top tubes for all type and screen orders.     Cephalosporins Rash     Influenza Vaccines Other (See Comments) and Nausea and Vomiting     HUT Reaction: Nausea And Vomiting; HUT Reaction Type: Intolerance; HUT Severity: Low; [facility] Noted: [id number]     Lamotrigine Rash     Possibly associated with Lamictal.   HUT Comment: Possibly associated with Lamictal. ; HUT Reaction: Rash;  "HUT Reaction Type: Allergy; HUT Severity: Low; [facility] Noted: [id number]     Latex Rash     HUT Reaction: Rash; HUT Reaction Type: Allergy; HUT Severity: Low; [facility] Noted: [id number]          Review of Systems   Constitutional: Negative for chills and fever.   HENT: Negative for congestion and trouble swallowing.    Eyes: Negative for visual disturbance.   Respiratory: Positive for shortness of breath. Negative for cough and wheezing.    Cardiovascular: Positive for chest pain. Negative for palpitations and leg swelling.   Gastrointestinal: Positive for nausea. Negative for abdominal pain and vomiting.   Genitourinary: Negative for dysuria.   Musculoskeletal: Negative for back pain and neck pain.   Skin: Negative for pallor.   Neurological: Negative for weakness, light-headedness, numbness and headaches.   Hematological: Negative for adenopathy.   Psychiatric/Behavioral: Negative for confusion.         Physical Exam   BP: (!) 165/96  Pulse: 92  Temp: 98.3  F (36.8  C)  Resp: 16  Height: 157.5 cm (5' 2\")  Weight: 121.1 kg (267 lb)  SpO2: 97 %  Physical Exam  Vitals signs and nursing note reviewed.   Constitutional:       General: She is not in acute distress.     Appearance: She is well-developed.   HENT:      Head: Normocephalic and atraumatic.   Eyes:      Extraocular Movements: Extraocular movements intact.      Pupils: Pupils are equal, round, and reactive to light.   Neck:      Musculoskeletal: Normal range of motion and neck supple.      Vascular: No JVD.   Cardiovascular:      Rate and Rhythm: Normal rate and regular rhythm.      Heart sounds: No murmur.   Pulmonary:      Effort: Pulmonary effort is normal. No respiratory distress.      Breath sounds: Normal breath sounds. No wheezing or rales.   Abdominal:      General: Abdomen is protuberant. Bowel sounds are normal.      Tenderness: There is no abdominal tenderness.   Musculoskeletal:      Right lower leg: No edema.      Left lower leg: No edema.   Skin:     General: " Skin is warm and dry.   Neurological:      General: No focal deficit present.      Mental Status: She is alert and oriented to person, place, and time.      Cranial Nerves: No cranial nerve deficit.      Motor: No weakness.   Psychiatric:         Attention and Perception: Attention normal.         Mood and Affect: Mood is anxious.         Speech: Speech normal.         Behavior: Behavior normal.         Thought Content: Thought content normal.         Cognition and Memory: Cognition normal.         Judgment: Judgment normal.         ED Course      Procedures             EKG Interpretation:      Interpreted by KIM LOCKHART MD  Time reviewed: 1553  Symptoms at time of EKG: chest pain   Rhythm: normal sinus   Rate: Normal  Axis: Normal  Ectopy: none  Conduction: normal  ST Segments/ T Waves: No ST-T wave changes and No acute ischemic changes  Q Waves: III and aVf  Comparison to prior: Unchanged from 2333    Clinical Impression: normal EKG    Labs/Imaging    Results for orders placed or performed during the hospital encounter of 09/05/20 (from the past 24 hour(s))   CBC with platelets differential   Result Value Ref Range    WBC 6.2 4.0 - 11.0 10e9/L    RBC Count 3.81 3.8 - 5.2 10e12/L    Hemoglobin 10.8 (L) 11.7 - 15.7 g/dL    Hematocrit 34.2 (L) 35.0 - 47.0 %    MCV 90 78 - 100 fl    MCH 28.3 26.5 - 33.0 pg    MCHC 31.6 31.5 - 36.5 g/dL    RDW 14.8 10.0 - 15.0 %    Platelet Count 243 150 - 450 10e9/L    Diff Method Automated Method     % Neutrophils 61.6 %    % Lymphocytes 25.6 %    % Monocytes 9.4 %    % Eosinophils 2.8 %    % Basophils 0.3 %    % Immature Granulocytes 0.3 %    Nucleated RBCs 0 0 /100    Absolute Neutrophil 3.8 1.6 - 8.3 10e9/L    Absolute Lymphocytes 1.6 0.8 - 5.3 10e9/L    Absolute Monocytes 0.6 0.0 - 1.3 10e9/L    Absolute Eosinophils 0.2 0.0 - 0.7 10e9/L    Absolute Basophils 0.0 0.0 - 0.2 10e9/L    Abs Immature Granulocytes 0.0 0 - 0.4 10e9/L    Absolute Nucleated RBC 0.0    Comprehensive  metabolic panel   Result Value Ref Range    Sodium 138 133 - 144 mmol/L    Potassium 3.7 3.4 - 5.3 mmol/L    Chloride 109 94 - 109 mmol/L    Carbon Dioxide 26 20 - 32 mmol/L    Anion Gap 3 3 - 14 mmol/L    Glucose 107 (H) 70 - 99 mg/dL    Urea Nitrogen 11 7 - 30 mg/dL    Creatinine 0.59 0.52 - 1.04 mg/dL    GFR Estimate >90 >60 mL/min/[1.73_m2]    GFR Estimate If Black >90 >60 mL/min/[1.73_m2]    Calcium 8.4 (L) 8.5 - 10.1 mg/dL    Bilirubin Total 0.1 (L) 0.2 - 1.3 mg/dL    Albumin 2.8 (L) 3.4 - 5.0 g/dL    Protein Total 8.1 6.8 - 8.8 g/dL    Alkaline Phosphatase 70 40 - 150 U/L    ALT 26 0 - 50 U/L    AST 15 0 - 45 U/L   D dimer quantitative   Result Value Ref Range    D Dimer 0.8 (H) 0.0 - 0.50 ug/ml FEU   INR   Result Value Ref Range    INR 1.06 0.86 - 1.14   Troponin I   Result Value Ref Range    Troponin I ES <0.015 0.000 - 0.045 ug/L   CRP inflammation   Result Value Ref Range    CRP Inflammation 22.0 (H) 0.0 - 8.0 mg/L   Nt probnp inpatient (BNP)   Result Value Ref Range    N-Terminal Pro BNP Inpatient 47 0 - 450 pg/mL   Lipase   Result Value Ref Range    Lipase 152 73 - 393 U/L   EKG 12-lead, tracing only   Result Value Ref Range    Interpretation ECG Click View Image link to view waveform and result    UA with Microscopic   Result Value Ref Range    Color Urine Yellow     Appearance Urine Clear     Glucose Urine Negative NEG^Negative mg/dL    Bilirubin Urine Negative NEG^Negative    Ketones Urine Negative NEG^Negative mg/dL    Specific Gravity Urine 1.015 1.003 - 1.035    Blood Urine Negative NEG^Negative    pH Urine 6.0 5.0 - 7.0 pH    Protein Albumin Urine 10 (A) NEG^Negative mg/dL    Urobilinogen mg/dL Normal 0.0 - 2.0 mg/dL    Nitrite Urine Negative NEG^Negative    Leukocyte Esterase Urine Negative NEG^Negative    Source Midstream Urine     WBC Urine 0 0 - 5 /HPF    RBC Urine <1 0 - 2 /HPF    Squamous Epithelial /HPF Urine <1 0 - 1 /HPF    Mucous Urine Present (A) NEG^Negative /LPF   Drug abuse screen 6  urine (chem dep)   Result Value Ref Range    Amphetamine Qual Urine Negative NEG^Negative    Barbiturates Qual Urine Negative NEG^Negative    Benzodiazepine Qual Urine Negative NEG^Negative    Cannabinoids Qual Urine Negative NEG^Negative    Cocaine Qual Urine Negative NEG^Negative    Ethanol Qual Urine Negative NEG^Negative    Opiates Qualitative Urine Negative NEG^Negative   XR Chest Port 1 View    Narrative    XR CHEST PORT 1 VW  9/5/2020 4:52 PM      HISTORY: chest pain    COMPARISON: CT chest 8/31/2020.    FINDINGS:   AP upright radiograph of the chest. Right Port-A-Cath tip projects in  the high right atrium.    Trachea is midline. Heart size is normal. Pulmonary vasculature is  distinct. No focal airspace opacity, pleural effusion, or  pneumothorax.    No acute osseous abnormality.    Visualized upper abdomen is normal. Soft tissues are within normal  limits.      Impression    IMPRESSION:   No acute cardiopulmonary abnormalities.    I have personally reviewed the examination and initial interpretation  and I agree with the findings.    CHINO PERAZA MD   CT Chest Pulmonary Embolism w Contrast    Narrative    EXAMINATION: CTA pulmonary angiogram, 9/5/2020 6:54 PM     COMPARISON: CT chest 8/31/2020.    HISTORY: Left pleuritic chest pain.    TECHNIQUE: Volumetric helical acquisition of CT images of the chest  from the lung apices to the kidneys were acquired after the  administration of 80 mL of Isovue-370 IV contrast. Non-Flash technique  with shallow inspiratory breath hold technique.  Post-processed  multiplanar and/or MIP reformations were obtained, archived to PACS  and used in interpretation of this study.     FINDINGS:      Contrast bolus is: adequate.  Exam is negative for acute pulmonary  embolism.    The largest right ventricle transaxial diameter is (measured from  endocardium to endocardium): cm   The largest left ventricle transaxial diameter is (measured from  endocardium to endocardium):  cm  RV/LV ratio is <1 (if ratio greater than 1.1 then sign is suspicious  for right heart strain)  Reflux of contrast into the IVC? no  Paradoxical bowing of the interventricular septum to the left? no    Chest:     Right Port-A-Cath tip projects in the right atrium.    Thyroid gland appears unremarkable. Tracheobronchial tree appears  patent. Esophagus appears unremarkable. No suspicious lung nodules. No  focal airspace opacities. No pleural effusion. The pleura appears  unremarkable.. No pneumothorax. Heart size within normal limits.. No  significant pericardial effusion..  Visualized thoracic aorta and main  pulmonary artery diameters appear within normal limits. Normal 3  vessel branching pattern of the great vessels.   There are no  visualized pathologically enlarged mediastinal, hilar or axillary  lymph nodes.     Abdomen: Visualized abdomen is limited.. Cholelithiasis without  cholecystitis.    Bones and Soft Tissues: No suspicious osseous lesion. No suspicious  mass.      Impression    IMPRESSION:   1. No pulmonary edema is appreciated. No focal airspace disease.    2. Cholelithiasis without cholecystitis.    In the event of a positive result for acute pulmonary embolism  resulting in right heart strain, consider calling the   [facility]  for PERT (Pulmonary Embolism Response Team)  Activation?    PERT -- Pulmonary Embolism Response Team (Multidisciplinary team  including cardiology, interventional radiology, critical care,  hematology)    I have personally reviewed the examination and initial interpretation  and I agree with the findings.    CHINO PERAZA MD     Medications   HYDROmorphone (PF) (DILAUDID) injection 0.5 mg (0.5 mg Intravenous Given 9/5/20 1654)   ondansetron (ZOFRAN) injection 4 mg (4 mg Intravenous Given 9/5/20 1654)   HYDROmorphone (PF) (DILAUDID) injection 0.5 mg (0.5 mg Intravenous Given 9/5/20 1753)   iopamidol (ISOVUE-370) solution 75 mL (75 mLs Intravenous Given 9/5/20  1839)   sodium chloride (PF) 0.9% PF flush 100 mL (100 mLs Intravenous Given 9/5/20 1839)   diphenhydrAMINE (BENADRYL) injection 25 mg (25 mg Intravenous Given 9/5/20 1937)        Assessments & Plan (with Medical Decision Making)   Impression:  Young female with history of recurrent SIB, borderline personality disorder and past PE presents with left lateral pleuritic chest pain starting about 2 hours before arrival. She has no acute changes on EKG. Her vital signs are stable. Troponin is normal. D-dimer is mildly elevated, but there is no evidence of PE on chest CTA. There are no pulmonary infiltrates. CBC, LFTs and lipase are normal. There are no other acute findings on chest CT. I find no evidence of ACS.     I have reviewed the nursing notes. I have reviewed the findings, diagnosis, plan and need for follow up with the patient.    New Prescriptions    No medications on file       Final diagnoses:   Chest pain on breathing       --  Gustavo Quintero MD  [facility], [place], EMERGENCY DEPARTMENT  9/5/2020     Gustavo Quintero MD  09/05/20 [id number]

## 2020-09-05 NOTE — ED TRIAGE NOTES
Pt BIBA for chest pain and SOB that worsens with exertion. Was taken off eliquis a few months ago. Hx PE. 324mg aspirin and nitroglycerin x1 given without relief.

## 2020-09-05 NOTE — ED AVS SNAPSHOT
[facility], [place], Emergency Department  [address] [place] [id number]  Phone:  [phone]                                    Nevin Alvarado   MRN: [id number]    Department:  [facility], Emergency Department   Date of Visit:  9/5/2020           After Visit Summary Signature Page    I have received my discharge instructions, and my questions have been answered. I have discussed any challenges I see with this plan with the nurse or doctor.    ..........................................................................................................................................  Patient/Patient Representative Signature      ..........................................................................................................................................  Patient Representative Print Name and Relationship to Patient    ..................................................               ................................................  Date                                   Time    ..........................................................................................................................................  Reviewed by Signature/Title    ...................................................              ..............................................  Date                                               Time          22EPIC Rev 08/18

## 2020-09-05 NOTE — ED NOTES
Pt placed on 1:1 for safety. Garage door closed. Belongings taken out of the room. Primary RN updated on plan of care.

## 2020-09-06 LAB — INTERPRETATION ECG - MUSE: NORMAL

## 2020-09-06 NOTE — DISCHARGE INSTRUCTIONS
Use tylenol as needed for pain.  Follow up with your primary care clinic next week.  Return as needed for new or worsening symptoms.  Continue the Protonix

## 2020-09-07 ENCOUNTER — HOSPITAL ENCOUNTER (EMERGENCY)
Facility: CLINIC | Age: 30
Discharge: HOME | End: 2020-09-07
Attending: FAMILY MEDICINE | Admitting: FAMILY MEDICINE
Payer: COMMERCIAL

## 2020-09-07 VITALS
OXYGEN SATURATION: 96 % | RESPIRATION RATE: 16 BRPM | DIASTOLIC BLOOD PRESSURE: 98 MMHG | SYSTOLIC BLOOD PRESSURE: 147 MMHG | HEART RATE: 106 BPM | TEMPERATURE: 98.6 F

## 2020-09-07 DIAGNOSIS — T50.901A DRUG INGESTION, ACCIDENTAL OR UNINTENTIONAL, INITIAL ENCOUNTER: ICD-10-CM

## 2020-09-07 DIAGNOSIS — L29.9 ITCHING: ICD-10-CM

## 2020-09-07 DIAGNOSIS — R51.9 HEADACHE DISORDER: ICD-10-CM

## 2020-09-07 LAB
ALBUMIN SERPL-MCNC: 3.3 G/DL (ref 3.4–5)
ALBUMIN UR-MCNC: 30 MG/DL
ALP SERPL-CCNC: 69 U/L (ref 40–150)
ALT SERPL W P-5'-P-CCNC: 28 U/L (ref 0–50)
AMPHETAMINES UR QL SCN: NEGATIVE
ANION GAP SERPL CALCULATED.3IONS-SCNC: 4 MMOL/L (ref 3–14)
APAP SERPL-MCNC: <2 MG/L (ref 10–20)
APPEARANCE UR: CLEAR
APTT PPP: 34 SEC (ref 22–37)
AST SERPL W P-5'-P-CCNC: 19 U/L (ref 0–45)
BARBITURATES UR QL: NEGATIVE
BASOPHILS # BLD AUTO: 0 10E9/L (ref 0–0.2)
BASOPHILS NFR BLD AUTO: 0.4 %
BENZODIAZ UR QL: NEGATIVE
BILIRUB SERPL-MCNC: 0.2 MG/DL (ref 0.2–1.3)
BILIRUB UR QL STRIP: NEGATIVE
BUN SERPL-MCNC: 9 MG/DL (ref 7–30)
CALCIUM SERPL-MCNC: 9 MG/DL (ref 8.5–10.1)
CANNABINOIDS UR QL SCN: NEGATIVE
CHLORIDE SERPL-SCNC: 108 MMOL/L (ref 94–109)
CO2 SERPL-SCNC: 27 MMOL/L (ref 20–32)
COCAINE UR QL: NEGATIVE
COLOR UR AUTO: YELLOW
CREAT SERPL-MCNC: 0.51 MG/DL (ref 0.52–1.04)
DIFFERENTIAL METHOD BLD: ABNORMAL
EOSINOPHIL # BLD AUTO: 0.1 10E9/L (ref 0–0.7)
EOSINOPHIL NFR BLD AUTO: 1.5 %
ERYTHROCYTE [DISTWIDTH] IN BLOOD BY AUTOMATED COUNT: 14.6 % (ref 10–15)
ETHANOL SERPL-MCNC: <0.01 G/DL
ETHANOL UR QL SCN: NEGATIVE
GFR SERPL CREATININE-BSD FRML MDRD: >90 ML/MIN/{1.73_M2}
GLUCOSE SERPL-MCNC: 87 MG/DL (ref 70–99)
GLUCOSE UR STRIP-MCNC: NEGATIVE MG/DL
HCG UR QL: NEGATIVE
HCT VFR BLD AUTO: 37.8 % (ref 35–47)
HGB BLD-MCNC: 11.8 G/DL (ref 11.7–15.7)
HGB UR QL STRIP: NEGATIVE
HYALINE CASTS #/AREA URNS LPF: 1 /LPF (ref 0–2)
IMM GRANULOCYTES # BLD: 0 10E9/L (ref 0–0.4)
IMM GRANULOCYTES NFR BLD: 0.4 %
INR PPP: 1.04 (ref 0.86–1.14)
INTERNAL QC OK POCT: YES
INTERPRETATION ECG - MUSE: NORMAL
KETONES UR STRIP-MCNC: NEGATIVE MG/DL
LEUKOCYTE ESTERASE UR QL STRIP: NEGATIVE
LIPASE SERPL-CCNC: 97 U/L (ref 73–393)
LYMPHOCYTES # BLD AUTO: 1.9 10E9/L (ref 0.8–5.3)
LYMPHOCYTES NFR BLD AUTO: 23 %
MCH RBC QN AUTO: 27.9 PG (ref 26.5–33)
MCHC RBC AUTO-ENTMCNC: 31.2 G/DL (ref 31.5–36.5)
MCV RBC AUTO: 89 FL (ref 78–100)
MONOCYTES # BLD AUTO: 0.7 10E9/L (ref 0–1.3)
MONOCYTES NFR BLD AUTO: 8.8 %
MUCOUS THREADS #/AREA URNS LPF: PRESENT /LPF
NEUTROPHILS # BLD AUTO: 5.3 10E9/L (ref 1.6–8.3)
NEUTROPHILS NFR BLD AUTO: 65.9 %
NITRATE UR QL: NEGATIVE
NRBC # BLD AUTO: 0 10*3/UL
NRBC BLD AUTO-RTO: 0 /100
OPIATES UR QL SCN: POSITIVE
PH UR STRIP: 6.5 PH (ref 5–7)
PLATELET # BLD AUTO: 276 10E9/L (ref 150–450)
POTASSIUM SERPL-SCNC: 3.4 MMOL/L (ref 3.4–5.3)
PROT SERPL-MCNC: 8.7 G/DL (ref 6.8–8.8)
RBC # BLD AUTO: 4.23 10E12/L (ref 3.8–5.2)
RBC #/AREA URNS AUTO: <1 /HPF (ref 0–2)
SALICYLATES SERPL-MCNC: <2 MG/DL
SODIUM SERPL-SCNC: 138 MMOL/L (ref 133–144)
SOURCE: ABNORMAL
SP GR UR STRIP: 1.02 (ref 1–1.03)
SQUAMOUS #/AREA URNS AUTO: <1 /HPF (ref 0–1)
UROBILINOGEN UR STRIP-MCNC: NORMAL MG/DL (ref 0–2)
WBC # BLD AUTO: 8.1 10E9/L (ref 4–11)
WBC #/AREA URNS AUTO: <1 /HPF (ref 0–5)

## 2020-09-07 PROCEDURE — 25800030 ZZH RX IP 258 OP 636: Performed by: FAMILY MEDICINE

## 2020-09-07 PROCEDURE — 81025 URINE PREGNANCY TEST: CPT | Performed by: FAMILY MEDICINE

## 2020-09-07 PROCEDURE — 80307 DRUG TEST PRSMV CHEM ANLYZR: CPT | Performed by: FAMILY MEDICINE

## 2020-09-07 PROCEDURE — 85610 PROTHROMBIN TIME: CPT | Performed by: FAMILY MEDICINE

## 2020-09-07 PROCEDURE — 96375 TX/PRO/DX INJ NEW DRUG ADDON: CPT | Performed by: FAMILY MEDICINE

## 2020-09-07 PROCEDURE — 80320 DRUG SCREEN QUANTALCOHOLS: CPT | Performed by: FAMILY MEDICINE

## 2020-09-07 PROCEDURE — 81001 URINALYSIS AUTO W/SCOPE: CPT | Performed by: FAMILY MEDICINE

## 2020-09-07 PROCEDURE — 85025 COMPLETE CBC W/AUTO DIFF WBC: CPT | Performed by: FAMILY MEDICINE

## 2020-09-07 PROCEDURE — 96361 HYDRATE IV INFUSION ADD-ON: CPT | Performed by: FAMILY MEDICINE

## 2020-09-07 PROCEDURE — 93005 ELECTROCARDIOGRAM TRACING: CPT | Performed by: FAMILY MEDICINE

## 2020-09-07 PROCEDURE — 85730 THROMBOPLASTIN TIME PARTIAL: CPT | Performed by: FAMILY MEDICINE

## 2020-09-07 PROCEDURE — 25000132 ZZH RX MED GY IP 250 OP 250 PS 637: Performed by: FAMILY MEDICINE

## 2020-09-07 PROCEDURE — 80329 ANALGESICS NON-OPIOID 1 OR 2: CPT | Performed by: FAMILY MEDICINE

## 2020-09-07 PROCEDURE — 99284 EMERGENCY DEPT VISIT MOD MDM: CPT | Mod: 25 | Performed by: FAMILY MEDICINE

## 2020-09-07 PROCEDURE — 83690 ASSAY OF LIPASE: CPT | Performed by: FAMILY MEDICINE

## 2020-09-07 PROCEDURE — 25000128 H RX IP 250 OP 636: Performed by: FAMILY MEDICINE

## 2020-09-07 PROCEDURE — 93010 ELECTROCARDIOGRAM REPORT: CPT | Mod: Z6 | Performed by: FAMILY MEDICINE

## 2020-09-07 PROCEDURE — 96374 THER/PROPH/DIAG INJ IV PUSH: CPT | Performed by: FAMILY MEDICINE

## 2020-09-07 PROCEDURE — 80053 COMPREHEN METABOLIC PANEL: CPT | Performed by: FAMILY MEDICINE

## 2020-09-07 RX ORDER — HYDROXYZINE HYDROCHLORIDE 25 MG/1
25 TABLET, FILM COATED ORAL ONCE
Status: COMPLETED | OUTPATIENT
Start: 2020-09-07 | End: 2020-09-07

## 2020-09-07 RX ORDER — METOCLOPRAMIDE HYDROCHLORIDE 5 MG/ML
5 INJECTION INTRAMUSCULAR; INTRAVENOUS ONCE
Status: COMPLETED | OUTPATIENT
Start: 2020-09-07 | End: 2020-09-07

## 2020-09-07 RX ORDER — DIPHENHYDRAMINE HCL 25 MG
25 CAPSULE ORAL ONCE
Status: COMPLETED | OUTPATIENT
Start: 2020-09-07 | End: 2020-09-07

## 2020-09-07 RX ORDER — HEPARIN SODIUM (PORCINE) LOCK FLUSH IV SOLN 100 UNIT/ML 100 UNIT/ML
5 SOLUTION INTRAVENOUS
Status: DISCONTINUED | OUTPATIENT
Start: 2020-09-07 | End: 2020-09-07 | Stop reason: HOSPADM

## 2020-09-07 RX ORDER — DIPHENHYDRAMINE HYDROCHLORIDE 50 MG/ML
25 INJECTION INTRAMUSCULAR; INTRAVENOUS ONCE
Status: COMPLETED | OUTPATIENT
Start: 2020-09-07 | End: 2020-09-07

## 2020-09-07 RX ORDER — LIDOCAINE 40 MG/G
CREAM TOPICAL
Status: DISCONTINUED | OUTPATIENT
Start: 2020-09-07 | End: 2020-09-07 | Stop reason: HOSPADM

## 2020-09-07 RX ORDER — SODIUM CHLORIDE 9 MG/ML
INJECTION, SOLUTION INTRAVENOUS CONTINUOUS
Status: DISCONTINUED | OUTPATIENT
Start: 2020-09-07 | End: 2020-09-07 | Stop reason: HOSPADM

## 2020-09-07 RX ORDER — ONDANSETRON 2 MG/ML
4 INJECTION INTRAMUSCULAR; INTRAVENOUS EVERY 30 MIN PRN
Status: DISCONTINUED | OUTPATIENT
Start: 2020-09-07 | End: 2020-09-07 | Stop reason: HOSPADM

## 2020-09-07 RX ORDER — KETOROLAC TROMETHAMINE 15 MG/ML
15 INJECTION, SOLUTION INTRAMUSCULAR; INTRAVENOUS ONCE
Status: COMPLETED | OUTPATIENT
Start: 2020-09-07 | End: 2020-09-07

## 2020-09-07 RX ADMIN — DIPHENHYDRAMINE HYDROCHLORIDE 25 MG: 25 CAPSULE ORAL at 16:18

## 2020-09-07 RX ADMIN — DIPHENHYDRAMINE HYDROCHLORIDE 25 MG: 50 INJECTION, SOLUTION INTRAMUSCULAR; INTRAVENOUS at 15:39

## 2020-09-07 RX ADMIN — METOCLOPRAMIDE HYDROCHLORIDE 5 MG: 5 INJECTION INTRAMUSCULAR; INTRAVENOUS at 17:34

## 2020-09-07 RX ADMIN — HYDROXYZINE HYDROCHLORIDE 25 MG: 25 TABLET, FILM COATED ORAL at 17:34

## 2020-09-07 RX ADMIN — SODIUM CHLORIDE: 9 INJECTION, SOLUTION INTRAVENOUS at 17:36

## 2020-09-07 RX ADMIN — Medication 5 ML: at 18:24

## 2020-09-07 RX ADMIN — SODIUM CHLORIDE 1000 ML: 9 INJECTION, SOLUTION INTRAVENOUS at 15:39

## 2020-09-07 RX ADMIN — KETOROLAC TROMETHAMINE 15 MG: 15 INJECTION, SOLUTION INTRAMUSCULAR; INTRAVENOUS at 17:33

## 2020-09-07 ASSESSMENT — ENCOUNTER SYMPTOMS
APPETITE CHANGE: 1
ACTIVITY CHANGE: 1
WEAKNESS: 1
SHORTNESS OF BREATH: 0
CONFUSION: 0
WOUND: 0
NERVOUS/ANXIOUS: 1
SPEECH DIFFICULTY: 0
DYSPHORIC MOOD: 1
DECREASED CONCENTRATION: 1
VOICE CHANGE: 0
TROUBLE SWALLOWING: 0
BACK PAIN: 0
FATIGUE: 0
SEIZURES: 0
ABDOMINAL PAIN: 0
VOMITING: 0
HEADACHES: 1
NAUSEA: 1
COUGH: 0
NUMBNESS: 0
FEVER: 0
SLEEP DISTURBANCE: 0
LIGHT-HEADEDNESS: 1
ROS SKIN COMMENTS: ITCHING

## 2020-09-07 NOTE — ED AVS SNAPSHOT
[facility], [place], Emergency Department  [address] [place] [id number]  Phone:  [phone]                                    Nevin Alvarado   MRN: [id number]    Department:  [facility], Emergency Department   Date of Visit:  9/7/2020           After Visit Summary Signature Page    I have received my discharge instructions, and my questions have been answered. I have discussed any challenges I see with this plan with the nurse or doctor.    ..........................................................................................................................................  Patient/Patient Representative Signature      ..........................................................................................................................................  Patient Representative Print Name and Relationship to Patient    ..................................................               ................................................  Date                                   Time    ..........................................................................................................................................  Reviewed by Signature/Title    ...................................................              ..............................................  Date                                               Time          22EPIC Rev 08/18

## 2020-09-07 NOTE — ED PROVIDER NOTES
[place] EMERGENCY DEPARTMENT ([facility])  9/07/20    History     Chief Complaint   Patient presents with     Drug Overdose     x9 5mg oxy     HPI  Nevin Alvarado is a 29 year old female with a history of bipolar disorder, ADD, PTSD, anorexia, and frequent admissions for foreign body ingestion who presents to the Emergency Department for evaluation after ingesting 45 mg (9-5mg tablets) of oxycodone approx 1.5 hrs ago.  Patient states she has had a migraine all this week. Did not have any tylenol or ibuprofen so she took 1 oxycodone tablet. States she had a vivid dream. Then when awoke all (9) oxycodone 5mg tabs gone. Patient states she feels nauseated and itching and disconnected currently. She denies any suicidal intent. No HI or hallucinations. No other concerns of ingestions.    I have reviewed the Medications, Allergies, Past Medical and Surgical History, and Social History in the [employer] system.  PAST MEDICAL HISTORY:   Past Medical History:   Diagnosis Date     ADD (attention deficit disorder)      Anorexia nervosa with bulimia     history of; on Topamax     Anxiety      Borderline personality disorder (H)      Depression      H/O self-harm      History of pulmonary embolism 12/2019    Provoked. Completed 3 month course of Apixaban     Morbid obesity (H)      Neuropathy      PTSD (post-traumatic stress disorder)      Rectal foreign body - Recurrent issue, self placed      Swallowed foreign body - Recurrent issue, self placed        PAST SURGICAL HISTORY:   Past Surgical History:   Procedure Laterality Date     COMBINED ESOPHAGOSCOPY, GASTROSCOPY, DUODENOSCOPY (EGD), REPLACE ESOPHAGEAL STENT N/A 10/9/2019    Procedure: Upper Endoscopy with Suture Placement;  Surgeon: Zurdo Ramirez MD;  Location:  OR     ESOPHAGOSCOPY, GASTROSCOPY, DUODENOSCOPY (EGD), COMBINED N/A 3/9/2017    Procedure: COMBINED ESOPHAGOSCOPY, GASTROSCOPY, DUODENOSCOPY (EGD), REMOVE FOREIGN BODY;  Surgeon: Ramirez  Avis Traore MD;  [facility] OR     ESOPHAGOSCOPY, GASTROSCOPY, DUODENOSCOPY (EGD), COMBINED N/A 4/20/2017    Procedure: COMBINED ESOPHAGOSCOPY, GASTROSCOPY, DUODENOSCOPY (EGD), REMOVE FOREIGN BODY;  EGD removal Foregin body;  Surgeon: Lokesh Paula MD;  [facility]     ESOPHAGOSCOPY, GASTROSCOPY, DUODENOSCOPY (EGD), COMBINED N/A 6/12/2017    Procedure: COMBINED ESOPHAGOSCOPY, GASTROSCOPY, DUODENOSCOPY (EGD);  COMBINED ESOPHAGOSCOPY, GASTROSCOPY, DUODENOSCOPY (EGD) [[id number]]attempted removal of foreign body;  Surgeon: Pamela Perez MD;  [facility]     ESOPHAGOSCOPY, GASTROSCOPY, DUODENOSCOPY (EGD), COMBINED N/A 6/9/2017    Procedure: COMBINED ESOPHAGOSCOPY, GASTROSCOPY, DUODENOSCOPY (EGD), REMOVE FOREIGN BODY;  Esophagoscopy, Gastroscopy, Duodenoscopy, Removal of Foreign Body;  Surgeon: Dejon Marsh MD;  [facility]     ESOPHAGOSCOPY, GASTROSCOPY, DUODENOSCOPY (EGD), COMBINED N/A 1/6/2018    Procedure: COMBINED ESOPHAGOSCOPY, GASTROSCOPY, DUODENOSCOPY (EGD), REMOVE FOREIGN BODY;  COMBINED ESOPHAGOSCOPY, GASTROSCOPY, DUODENOSCOPY (EGD) [by pascal net and snare with profol sedation;  Surgeon: Feliciano Emmanuel MD;  Location:  GI     ESOPHAGOSCOPY, GASTROSCOPY, DUODENOSCOPY (EGD), COMBINED N/A 3/19/2018    Procedure: COMBINED ESOPHAGOSCOPY, GASTROSCOPY, DUODENOSCOPY (EGD), REMOVE FOREIGN BODY;   Esophagodscopy, Gastroscopy, Duodenoscopy,Foreign Body Removal;  Surgeon: Brice Guzmán MD;  [facility]     ESOPHAGOSCOPY, GASTROSCOPY, DUODENOSCOPY (EGD), COMBINED N/A 4/16/2018    Procedure: COMBINED ESOPHAGOSCOPY, GASTROSCOPY, DUODENOSCOPY (EGD), REMOVE FOREIGN BODY;  Esophagogastroduodenoscopy  Foreign Body Removal X 2;  Surgeon: Royer Moise MD;  [facility]     ESOPHAGOSCOPY, GASTROSCOPY, DUODENOSCOPY (EGD), COMBINED N/A 6/1/2018    Procedure: COMBINED ESOPHAGOSCOPY, GASTROSCOPY, DUODENOSCOPY (EGD), REMOVE FOREIGN BODY;  COMBINED  ESOPHAGOSCOPY, GASTROSCOPY, DUODENOSCOPY with removal of foreign body, propofol sedation by anesthesia;  Surgeon: Brice Martinez MD;  Location:  GI     ESOPHAGOSCOPY, GASTROSCOPY, DUODENOSCOPY (EGD), COMBINED N/A 7/25/2018    Procedure: COMBINED ESOPHAGOSCOPY, GASTROSCOPY, DUODENOSCOPY (EGD), REMOVE FOREIGN BODY;;  Surgeon: Candy Castelan MD;  Location:  GI     ESOPHAGOSCOPY, GASTROSCOPY, DUODENOSCOPY (EGD), COMBINED N/A 7/28/2018    Procedure: COMBINED ESOPHAGOSCOPY, GASTROSCOPY, DUODENOSCOPY (EGD), REMOVE FOREIGN BODY;  COMBINED ESOPHAGOSCOPY, GASTROSCOPY, DUODENOSCOPY (EGD), REMOVE FOREIGN BODY;  Surgeon: Brice Guzmán MD;  [facility]     ESOPHAGOSCOPY, GASTROSCOPY, DUODENOSCOPY (EGD), COMBINED N/A 7/31/2018    Procedure: COMBINED ESOPHAGOSCOPY, GASTROSCOPY, DUODENOSCOPY (EGD);  COMBINED ESOPHAGOSCOPY, GASTROSCOPY, DUODENOSCOPY (EGD) TO REMOVE FOREIGN BODY;  Surgeon: Lokesh Paula MD;  [facility]     ESOPHAGOSCOPY, GASTROSCOPY, DUODENOSCOPY (EGD), COMBINED N/A 8/4/2018    Procedure: COMBINED ESOPHAGOSCOPY, GASTROSCOPY, DUODENOSCOPY (EGD), REMOVE FOREIGN BODY;   combined esophagoscopy, gastroscopy, duodenoscopy, REMOVE FOREIGN BODY ;  Surgeon: Lokesh Paula MD;  [facility]     ESOPHAGOSCOPY, GASTROSCOPY, DUODENOSCOPY (EGD), COMBINED N/A 10/6/2019    Procedure: ESOPHAGOGASTRODUODENOSCOPY (EGD) with fireign body removal x2, esophageal stent placement with floroscopy;  Surgeon: Timoteo Espana MD;  [facility]     ESOPHAGOSCOPY, GASTROSCOPY, DUODENOSCOPY (EGD), COMBINED N/A 12/2/2019    Procedure: Esophagogastroduodenoscopy with esophageal stent removal, esophogram;  Surgeon: Kailee Tena MD;  [facility]     ESOPHAGOSCOPY, GASTROSCOPY, DUODENOSCOPY (EGD), COMBINED N/A 12/17/2019    Procedure: ESOPHAGOGASTRODUODENOSCOPY, WITH FOREIGN BODY REMOVAL;  Surgeon: Pamela Perez MD;  [facility]     ESOPHAGOSCOPY, GASTROSCOPY,  DUODENOSCOPY (EGD), COMBINED N/A 12/13/2019    Procedure: ESOPHAGOGASTRODUODENOSCOPY, WITH FOREIGN BODY REMOVAL;  Surgeon: Samia Stanton MD;  [facility]     ESOPHAGOSCOPY, GASTROSCOPY, DUODENOSCOPY (EGD), COMBINED N/A 12/28/2019    Procedure: ESOPHAGOGASTRODUODENOSCOPY (EGD) Removal of Foreign Body X 2;  Surgeon: Huy Kelely MD;  [facility]     ESOPHAGOSCOPY, GASTROSCOPY, DUODENOSCOPY (EGD), COMBINED N/A 1/5/2020    Procedure: ESOPHAGOGASTRODUOENOSCOPY WITH FOREIGN BODY REMOVAL;  Surgeon: Pamela Perez MD;  [facility]     ESOPHAGOSCOPY, GASTROSCOPY, DUODENOSCOPY (EGD), COMBINED N/A 1/3/2020    Procedure: ESOPHAGOGASTRODUODENOSCOPY (EGD) REMOVAL OF FOREIGN BODY.;  Surgeon: Pamela Perez MD;  [facility]     ESOPHAGOSCOPY, GASTROSCOPY, DUODENOSCOPY (EGD), COMBINED N/A 1/13/2020    Procedure: ESOPHAGOGASTRODUODENOSCOPY (EGD) for foreign body removal;  Surgeon: Lokesh Paula MD;  [facility]     ESOPHAGOSCOPY, GASTROSCOPY, DUODENOSCOPY (EGD), COMBINED N/A 1/18/2020    Procedure: Diagnostic ESOPHAGOGASTRODUODENOSCOPY (EGD;  Surgeon: Lokesh Paula MD;  [facility]     ESOPHAGOSCOPY, GASTROSCOPY, DUODENOSCOPY (EGD), COMBINED N/A 3/29/2020    Procedure: UPPER ENDOSCOPY WITH FOREIGN BODY REMOVAL;  Surgeon: Doug Hansen MD;  [facility]     ESOPHAGOSCOPY, GASTROSCOPY, DUODENOSCOPY (EGD), COMBINED N/A 7/11/2020    Procedure: ESOPHAGOGASTRODUODENOSCOPY (EGD); Upper Endoscopy WITH FOREIGN BODY REMOVAL;  Surgeon: Lyndsey Mendoza DO;  [facility]     ESOPHAGOSCOPY, GASTROSCOPY, DUODENOSCOPY (EGD), COMBINED N/A 7/29/2020    Procedure: ESOPHAGOGASTRODUODENOSCOPY REMOVAL OF FOREIGN BODY;  Surgeon: Samia Stanton MD;  Location:  OR     ESOPHAGOSCOPY, GASTROSCOPY, DUODENOSCOPY (EGD), COMBINED N/A 8/1/2020    Procedure: ESOPHAGOGASTRODUODENOSCOPY, WITH FOREIGN BODY REMOVAL;  Surgeon: Pameal Perez MD;  [facility]     ESOPHAGOSCOPY, GASTROSCOPY, DUODENOSCOPY (EGD), COMBINED N/A 8/18/2020    Procedure: ESOPHAGOGASTRODUODENOSCOPY (EGD) for foreign body removal;  Surgeon: Pamela Perez MD;  [facility]     ESOPHAGOSCOPY, GASTROSCOPY, DUODENOSCOPY (EGD), COMBINED N/A 8/27/2020    Procedure: ESOPHAGOGASTRODUODENOSCOPY (EGD) with foreign body removal;  Surgeon: Campbell Rogers MD;  [facility]     EXAM UNDER ANESTHESIA ANUS N/A 1/10/2017    Procedure: EXAM UNDER ANESTHESIA ANUS;  Surgeon: Annmarie Haynes MD;  [facility]     EXAM UNDER ANESTHESIA RECTUM N/A 7/19/2018    Procedure: EXAM UNDER ANESTHESIA RECTUM;  EXAM UNDER ANESTHESIA, REMOVAL OF RECTAL FOREIGN BODY;  Surgeon: Annmarie Haynes MD;  [facility]     HC REMOVE FECAL IMPACTION OR FB W ANESTHESIA N/A 12/18/2016    Procedure: REMOVE FECAL IMPACTION/FOREIGN BODY UNDER ANESTHESIA;  Surgeon: Soham Cano MD;  Location:  OR     KNEE SURGERY - removed a small tissue mass from knee Right      LAPAROSCOPIC ABLATION ENDOMETRIOSIS       LAPAROTOMY EXPLORATORY N/A 1/10/2017    Procedure: LAPAROTOMY EXPLORATORY;  Surgeon: Annmarie Haynes MD;  [facility]     LAPAROTOMY EXPLORATORY  09/11/2019    Manual manipulation of bowel to remove pill bottle in rectum     lymph nodes removed from neck; benign  age 7     MAMMOPLASTY REDUCTION Bilateral      RELEASE CARPAL TUNNEL Bilateral      SIGMOIDOSCOPY FLEXIBLE N/A 1/10/2017    Procedure: SIGMOIDOSCOPY FLEXIBLE;  Surgeon: Annmarie Haynes MD;  [facility]     SIGMOIDOSCOPY FLEXIBLE N/A 5/8/2018    Procedure: SIGMOIDOSCOPY FLEXIBLE;  flex sig with foreign body removal using snare and rattooth forcep;  Surgeon: Soham Cano MD;  Location:  GI     SIGMOIDOSCOPY FLEXIBLE N/A 2/20/2019    Procedure: Exam under anesthesia Colonoscopy with attempted  removal of rectal foreign body;  Surgeon: Estrada Chávez MD;  [facility]       Past  medical history, past surgical history, medications, and allergies were reviewed with the patient. Additional pertinent items: None    FAMILY HISTORY:   Family History   Problem Relation Age of Onset     Diabetes Type 2  Maternal Grandmother      Diabetes Type 2  Paternal Grandmother      Breast Cancer Paternal Grandmother      Cerebrovascular Disease Father 53     Myocardial Infarction No family hx of      Coronary Artery Disease Early Onset No family hx of      Ovarian Cancer No family hx of      Colon Cancer No family hx of        SOCIAL HISTORY:   Social History     Tobacco Use     Smoking status: Never Smoker     Smokeless tobacco: Never Used   Substance Use Topics     Alcohol use: No     Alcohol/week: 0.0 standard drinks     Social history was reviewed with the patient. Additional pertinent items: None      Discharge Medication List as of 9/7/2020  6:20 PM      CONTINUE these medications which have NOT CHANGED    Details   acetaminophen (TYLENOL) 32 mg/mL liquid Take 31.25 mLs (1,000 mg) by mouth every 6 hours as needed for fever or pain, Disp-355 mL, R-0, E-Prescribe      albuterol (PROAIR HFA/PROVENTIL HFA/VENTOLIN HFA) 108 (90 Base) MCG/ACT inhaler Inhale 2 puffs into the lungs as needed for shortness of breath / dyspnea or wheezing, HistoricalPharmacy may dispense brand covered by insurance (Proair, or proventil or ventolin or generic albuterol inhaler)      busPIRone (BUSPAR) 15 MG tablet Take 1 tablet (15 mg) by mouth 2 times daily, No Print Out      Cholecalciferol (VITAMIN D) 50 MCG (2000 UT) CAPS Take 2,000 Units by mouth daily , Historical      cloNIDine (CATAPRES) 0.1 MG tablet Take 0.1 mg by mouth 2 times daily , Historical      desvenlafaxine (PRISTIQ) 100 MG 24 hr tablet Take 1 tablet (100 mg) by mouth every morning, Historical      docosanol (ABREVA) 10 % CREA cream Apply to lip 5 times a day as soon as symptoms begin, do not use for more than 10 days. Used PRN.Historical      hydroxychloroquine  (PLAQUENIL) 200 MG tablet Take 200 mg by mouth 2 times daily, Historical      hydrOXYzine (ATARAX) 50 MG tablet Take 1 tablet (50 mg) by mouth 3 times daily as needed for anxiety, Disp-30 tablet, R-0, E-Prescribe      lidocaine (XYLOCAINE) 2 % solution Swish and swallow 15 mLs in mouth every 4 hours as needed for moderate pain ; Max 8 doses/24 hour period., Disp-100 mL,R-0, E-Prescribe      lurasidone (LATUDA) 60 MG TABS tablet Take 1 tablet (60 mg) by mouth at bedtime, Historical      metFORMIN (GLUCOPHAGE-XR) 500 MG 24 hr tablet Take 1,000 mg by mouth Take 1 tablet (500 mg) by mouth daily , Historical      naltrexone (DEPADE/REVIA) 50 MG tablet Take 1 tablet (50 mg) by mouth every evening, No Print Out      norethindrone (MICRONOR) 0.35 MG tablet Take 0.35 mg by mouth daily, Historical      ondansetron (ZOFRAN-ODT) 4 MG ODT tab Take 4 mg by mouth every 6 hours as needed for nausea or vomiting , Historical      pantoprazole (PROTONIX) 40 MG EC tablet Take 1 tablet (40 mg) by mouth daily, Disp-30 tablet,R-1, E-PrescribeMay stop after 8 weeks      pregabalin (LYRICA) 50 MG capsule Take 50 mg by mouth 3 times daily, Historical      Prenatal Vit-Fe Fumarate-FA (PNV PRENATAL PLUS MULTIVITAMIN) 27-1 MG TABS per tablet Take 1 tablet by mouth daily, Historical      topiramate (TOPAMAX) 100 MG tablet Take 0.5 tablets (50 mg) by mouth daily Take 1 tablet (100 mg) by mouth daily at bedtime, No Print Out                Allergies   Allergen Reactions     Amoxicillin-Pot Clavulanate Other (See Comments), Swelling and Rash     PN: facial swelling, left side. Also had cortisone injection the same day as she started the Augmentin.  Noted in downtime recovery of chart.    PN: facial swelling, left side. Also had cortisone injection the same day as she started the Augmentin.; HUT Comment: PN: facial swelling, left side. Also had cortisone injection the same day as she started the Augmentin.Noted in downtime recovery of chart.; HUT  Reaction: Rash; HUT Reaction: Unknown; HUT Reaction Type: Allergy; HUT Severity: Med; HUT Noted: [id number]     Oseltamivir Hives     med stopped, PN: med stopped  med stopped, PN: med stopped; HUT Comment: med stopped, PN: med stopped; HUT Reaction: Hives; HUT Reaction Type: Allergy; HUT Severity: Med; HUT Noted: [id number]     Penicillins Anaphylaxis     HUT Reaction: Anaphylaxis; HUT Reaction Type: Allergy; HUT Severity: High; HUT Noted: [id number]     Vancomycin Itching, Swelling and Rash     Other reaction(s): Redness  Flushed face, very itchy; HUT Comment: Flushed face, very itchy; HUT Reaction: Angioedema; HUT Reaction: Redness; HUT Severity: Med; HUT Noted: [id number]    facial     Hydrocodone Nausea and Vomiting and GI Disturbance     vomiting for days, PN: vomiting for days; HUT Comment: vomiting for days; HUT Reaction: Gastrointestinal; HUT Reaction: Nausea And Vomiting; HUT Reaction Type: Intolerance; HUT Severity: Med; HUT Noted: [id number]  vomiting for days       Blood-Group Specific Substance Other (See Comments)     Patient has an anti-Cw and non-specific antibodies. Blood product orders may be delayed. Draw one red top and two purple top tubes for all type/screen/crossmatch orders.  Patient has anti-Cw, anti-K ([place]), Warm auto and nonspecific antibodies. Blood products may be delayed. Draw patient 24 hours prior to transfusion. Draw one red top and two purple top tubes for all type and screen orders.     Cephalosporins Rash     Influenza Vaccines Other (See Comments) and Nausea and Vomiting     HUT Reaction: Nausea And Vomiting; HUT Reaction Type: Intolerance; HUT Severity: Low; HUT Noted: [id number]     Lamotrigine Rash     Possibly associated with Lamictal.   HUT Comment: Possibly associated with Lamictal. ; HUT Reaction: Rash; HUT Reaction Type: Allergy; HUT Severity: Low; HUT Noted: [id number]     Latex Rash     HUT Reaction: Rash; HUT Reaction Type: Allergy; HUT Severity: Low; HUT Noted: [id number]         Review of Systems   Constitutional: Positive for activity change and appetite change (nausea). Negative for fatigue and fever.   HENT: Negative for trouble swallowing and voice change.    Eyes: Negative for visual disturbance.   Respiratory: Negative for cough and shortness of breath.    Cardiovascular: Negative for chest pain.   Gastrointestinal: Positive for nausea. Negative for abdominal pain and vomiting.   Musculoskeletal: Negative for back pain and gait problem.   Skin: Negative for rash and wound.        Itching     Allergic/Immunologic: Negative for immunocompromised state (migraine).   Neurological: Positive for weakness (general), light-headedness and headaches. Negative for seizures, syncope, speech difficulty and numbness.   Psychiatric/Behavioral: Positive for decreased concentration and dysphoric mood. Negative for confusion, sleep disturbance and suicidal ideas. The patient is nervous/anxious.    All other systems reviewed and are negative.    A complete review of systems was performed with pertinent positives and negatives noted in the HPI, and all other systems negative.       Physical Exam   BP: (!) 173/107  Pulse: 108  Temp: 98.6  F (37  C)  Resp: 16  SpO2: 96 %      Physical Exam  Vitals signs and nursing note reviewed.   Constitutional:       General: She is in acute distress.      Appearance: She is not ill-appearing, toxic-appearing or diaphoretic.      Comments: Patient interactive does not appear to be sedate airway intact.   HENT:      Head: Atraumatic.      Mouth/Throat:      Mouth: Mucous membranes are moist.      Pharynx: No oropharyngeal exudate.   Eyes:      General: No scleral icterus.     Extraocular Movements: Extraocular movements intact.      Conjunctiva/sclera: Conjunctivae normal.      Pupils: Pupils are equal, round, and reactive to light.   Neck:      Musculoskeletal: Normal range of motion and neck supple.   Cardiovascular:      Rate and Rhythm: Normal rate and regular  rhythm.      Heart sounds: Normal heart sounds.   Pulmonary:      Effort: No respiratory distress.      Breath sounds: Normal breath sounds.   Abdominal:      General: Bowel sounds are normal.      Palpations: Abdomen is soft.      Tenderness: There is no abdominal tenderness.   Musculoskeletal:         General: No tenderness.   Skin:     General: Skin is warm.      Capillary Refill: Capillary refill takes less than 2 seconds.      Findings: No rash.   Neurological:      General: No focal deficit present.      Mental Status: She is alert and oriented to person, place, and time. Mental status is at baseline.   Psychiatric:      Comments: Patient mildly flattened affect otherwise appropriate not suicidal not homicidal not delusional         ED Course     Patient was evaluated here in the ER is been cooperative.  IV established labs drawn.  Patient complained of itching also throughout.  Patient given 25 of Benadryl IV along with 25 mg orally of Benadryl.  Patient also received Zofran for nausea.  EKG done as noted did not show any changes labs are drawn Tylenol aspirin alcohol level negative drug screen positive for opiates other labs stable.  Discussed with poison control also.  Patient received liter normal saline in the ER is been stable reassessed and observed several hours.  This point with a short half-life no other ingestion concerns patient should be medically cleared in about 4 hours post ingestions.  Patient initially had told me that she had not had a migraine and 8 years then stated that [facility].  She still would like something for headache.  Patient given 5 mg of Reglan along with 15 mg of Toradol IV after labs are back also given Vistaril 25 mg orally for the itching    At this point there notes no intention as far as overdose etc. patient here in the ER appears stable observed for several hours at this point the alleged unintentional ingestion of  opiate appears stable.respiratory depression etc.  As noted per recommendations of poison control patient appears medically stable here treated for headache also she would then be discharged home to follow-up with MD.  The itching should improve after if it is associated with a histamine release from the opiates.       Procedures             EKG Interpretation:      Interpreted by Aquilino Ragsdale MD  Time reviewed: 1510  Symptoms at time of EKG: drug ingestion   Rhythm: normal sinus   Rate: normal  Axis: normal  Ectopy: none  Conduction: normal  ST Segments/ T Waves: No ST-T wave changes  Q Waves: none  Comparison to prior: No old EKG available    Clinical Impression: normal EKG                        Results for orders placed or performed during the hospital encounter of 09/07/20 (from the past 24 hour(s))   EKG 12 lead   Result Value Ref Range    Interpretation ECG Click View Image link to view waveform and result    CBC with platelets differential   Result Value Ref Range    WBC 8.1 4.0 - 11.0 10e9/L    RBC Count 4.23 3.8 - 5.2 10e12/L    Hemoglobin 11.8 11.7 - 15.7 g/dL    Hematocrit 37.8 35.0 - 47.0 %    MCV 89 78 - 100 fl    MCH 27.9 26.5 - 33.0 pg    MCHC 31.2 (L) 31.5 - 36.5 g/dL    RDW 14.6 10.0 - 15.0 %    Platelet Count 276 150 - 450 10e9/L    Diff Method Automated Method     % Neutrophils 65.9 %    % Lymphocytes 23.0 %    % Monocytes 8.8 %    % Eosinophils 1.5 %    % Basophils 0.4 %    % Immature Granulocytes 0.4 %    Nucleated RBCs 0 0 /100    Absolute Neutrophil 5.3 1.6 - 8.3 10e9/L    Absolute Lymphocytes 1.9 0.8 - 5.3 10e9/L    Absolute Monocytes 0.7 0.0 - 1.3 10e9/L    Absolute Eosinophils 0.1 0.0 - 0.7 10e9/L    Absolute Basophils 0.0 0.0 - 0.2 10e9/L    Abs Immature Granulocytes 0.0 0 - 0.4 10e9/L    Absolute Nucleated RBC 0.0    INR   Result Value Ref Range    INR 1.04 0.86 - 1.14   Partial thromboplastin time   Result Value Ref Range    PTT 34 22 - 37 sec   Comprehensive metabolic panel    Result Value Ref Range    Sodium 138 133 - 144 mmol/L    Potassium 3.4 3.4 - 5.3 mmol/L    Chloride 108 94 - 109 mmol/L    Carbon Dioxide 27 20 - 32 mmol/L    Anion Gap 4 3 - 14 mmol/L    Glucose 87 70 - 99 mg/dL    Urea Nitrogen 9 7 - 30 mg/dL    Creatinine 0.51 (L) 0.52 - 1.04 mg/dL    GFR Estimate >90 >60 mL/min/[1.73_m2]    GFR Estimate If Black >90 >60 mL/min/[1.73_m2]    Calcium 9.0 8.5 - 10.1 mg/dL    Bilirubin Total 0.2 0.2 - 1.3 mg/dL    Albumin 3.3 (L) 3.4 - 5.0 g/dL    Protein Total 8.7 6.8 - 8.8 g/dL    Alkaline Phosphatase 69 40 - 150 U/L    ALT 28 0 - 50 U/L    AST 19 0 - 45 U/L   Lipase   Result Value Ref Range    Lipase 97 73 - 393 U/L   Acetaminophen level   Result Value Ref Range    Acetaminophen Level <2 mg/L   Salicylate level   Result Value Ref Range    Salicylate Level <2 mg/dL   Alcohol ethyl   Result Value Ref Range    Ethanol g/dL <0.01 <0.01 g/dL   Drug abuse screen 6 urine (chem dep) ([facility])   Result Value Ref Range    Amphetamine Qual Urine Negative NEG^Negative    Barbiturates Qual Urine Negative NEG^Negative    Benzodiazepine Qual Urine Negative NEG^Negative    Cannabinoids Qual Urine Negative NEG^Negative    Cocaine Qual Urine Negative NEG^Negative    Ethanol Qual Urine Negative NEG^Negative    Opiates Qualitative Urine Positive (A) NEG^Negative   UA reflex to Microscopic and Culture    Specimen: Urine clean catch; Midstream Urine   Result Value Ref Range    Color Urine Yellow     Appearance Urine Clear     Glucose Urine Negative NEG^Negative mg/dL    Bilirubin Urine Negative NEG^Negative    Ketones Urine Negative NEG^Negative mg/dL    Specific Gravity Urine 1.022 1.003 - 1.035    Blood Urine Negative NEG^Negative    pH Urine 6.5 5.0 - 7.0 pH    Protein Albumin Urine 30 (A) NEG^Negative mg/dL    Urobilinogen mg/dL Normal 0.0 - 2.0 mg/dL    Nitrite Urine Negative NEG^Negative    Leukocyte Esterase Urine Negative NEG^Negative    Source Midstream Urine     RBC Urine <1 0 - 2 /HPF    WBC  Urine <1 0 - 5 /HPF    Squamous Epithelial /HPF Urine <1 0 - 1 /HPF    Mucous Urine Present (A) NEG^Negative /LPF    Hyaline Casts 1 0 - 2 /LPF   hCG qual urine POCT   Result Value Ref Range    HCG Qual Urine Negative neg    Internal QC OK Yes      Medications   0.9% sodium chloride BOLUS (0 mLs Intravenous Stopped 9/7/20 1719)   diphenhydrAMINE (BENADRYL) injection 25 mg (25 mg Intravenous Given 9/7/20 1539)   diphenhydrAMINE (BENADRYL) capsule 25 mg (25 mg Oral Given 9/7/20 1618)   metoclopramide (REGLAN) injection 5 mg (5 mg Intravenous Given 9/7/20 1734)   ketorolac (TORADOL) injection 15 mg (15 mg Intravenous Given 9/7/20 1733)   hydrOXYzine (ATARAX) tablet 25 mg (25 mg Oral Given 9/7/20 1734)             Assessments & Plan (with Medical Decision Making)  29-year-old female history of some mental health issues presented the ER with unintentional ingestion of 45 mg of oxycodone immediate release.  Patient states that she was having a bad dream and woke up and then somehow unintentionally took the rest of these she does not feel suicidal etc. presented the ER was observed several hours postingestion without signs of respiratory depression etc.  Discussed with poison control agree.  Salicylate Tylenol alcohol negative opiates only seen in drug screen other labs stable given IV fluids has migraine headache treated with Zofran Benadryl for itching from the opiates histamine release.  Patient given Reglan and Toradol to be discharged home with follow-up with MD and reassured at this point medically stable from an unintentional alleged ingestion of oxycodone.         I have reviewed the nursing notes.    I have reviewed the findings, diagnosis, plan and need for follow up with the patient.    Discharge Medication List as of 9/7/2020  6:20 PM          Final diagnoses:   Drug ingestion, accidental or unintentional, initial encounter   Itching   Headache disorder       9/7/2020   [facility], [facility], EMERGENCY  DEPARTMENT    This note was created at least in part by the use of dragon voice dictation system. Inadvertent typographical errors may still exist.  Aquilino Ragsdale MD.    Patient evaluated in the emergency department during the COVID-19 pandemic period. Careful attention to patients safety was addressed throughout the evaluation. Evaluation and treatment management was initiated with disposition made efficiently and appropriate as possible to minimize any risk of potential exposure to patient during this evaluation.       Aquilino Ragsdale MD  09/07/20 [id number]

## 2020-09-07 NOTE — ED TRIAGE NOTES
Triage Assessment & Note:      Patient presents with: Pt BIBA from home after calling and saying that she might have taken x9 5 mg of oxycodone. No reports of fever, cough, SOB, CP, or travel.     Home Treatments/Remedies: n/a    Febrile / Afebrile: n/a    Duration of C/o: < 5 hrs    Flower Johnson RN  September 7, 2020

## 2020-09-07 NOTE — ED TRIAGE NOTES
"BIBA from home. Pt reports having chest pain and migraine for over one week. She took 5 mg of oxycodone then fell asleep. \" I had a bad dream about the pain and when I woke up the pill bottle with about 10 pills were empty.\" She estimates  taking about 50 mg of oxycodone and reports nausea, shallow breathing, itching and feeling sleepy. Pt with hx of mental illness, denies any self harm or SI.  "

## 2020-09-07 NOTE — DISCHARGE INSTRUCTIONS
Home.  Labs and ekg look ok.  Discussed with poison control who felt symptoms will gradually go away. OK to go home after 4 hours post ingestion.  The itching should get better.  OK to take benadryl as needed along with tylenol and ibuprofen.  Follow up with your MD for a recheck.

## 2020-09-08 ENCOUNTER — HOSPITAL ENCOUNTER (EMERGENCY)
Facility: CLINIC | Age: 30
Discharge: HOME | End: 2020-09-09
Attending: EMERGENCY MEDICINE | Admitting: EMERGENCY MEDICINE
Payer: COMMERCIAL

## 2020-09-08 DIAGNOSIS — T45.0X2A INTENTIONAL DIPHENHYDRAMINE OVERDOSE, INITIAL ENCOUNTER (H): ICD-10-CM

## 2020-09-08 LAB
ALBUMIN SERPL-MCNC: 3.1 G/DL (ref 3.4–5)
ALBUMIN UR-MCNC: NEGATIVE MG/DL
ALP SERPL-CCNC: 77 U/L (ref 40–150)
ALT SERPL W P-5'-P-CCNC: 33 U/L (ref 0–50)
AMPHETAMINES UR QL SCN: NEGATIVE
ANION GAP SERPL CALCULATED.3IONS-SCNC: 5 MMOL/L (ref 3–14)
APAP SERPL-MCNC: <2 MG/L (ref 10–20)
APPEARANCE UR: CLEAR
AST SERPL W P-5'-P-CCNC: 23 U/L (ref 0–45)
BARBITURATES UR QL: NEGATIVE
BASOPHILS # BLD AUTO: 0.1 10E9/L (ref 0–0.2)
BASOPHILS NFR BLD AUTO: 0.6 %
BENZODIAZ UR QL: NEGATIVE
BILIRUB SERPL-MCNC: 0.2 MG/DL (ref 0.2–1.3)
BILIRUB UR QL STRIP: NEGATIVE
BUN SERPL-MCNC: 10 MG/DL (ref 7–30)
CALCIUM SERPL-MCNC: 9.4 MG/DL (ref 8.5–10.1)
CANNABINOIDS UR QL SCN: NEGATIVE
CHLORIDE SERPL-SCNC: 107 MMOL/L (ref 94–109)
CO2 SERPL-SCNC: 27 MMOL/L (ref 20–32)
COCAINE UR QL: NEGATIVE
COLOR UR AUTO: NORMAL
CREAT SERPL-MCNC: 0.56 MG/DL (ref 0.52–1.04)
DIFFERENTIAL METHOD BLD: ABNORMAL
EOSINOPHIL # BLD AUTO: 0.1 10E9/L (ref 0–0.7)
EOSINOPHIL NFR BLD AUTO: 1.5 %
ERYTHROCYTE [DISTWIDTH] IN BLOOD BY AUTOMATED COUNT: 14.6 % (ref 10–15)
ETHANOL SERPL-MCNC: <0.01 G/DL
ETHANOL UR QL SCN: NEGATIVE
GFR SERPL CREATININE-BSD FRML MDRD: >90 ML/MIN/{1.73_M2}
GLUCOSE SERPL-MCNC: 107 MG/DL (ref 70–99)
GLUCOSE UR STRIP-MCNC: NEGATIVE MG/DL
HCG UR QL: NEGATIVE
HCT VFR BLD AUTO: 36 % (ref 35–47)
HGB BLD-MCNC: 11.4 G/DL (ref 11.7–15.7)
HGB UR QL STRIP: NEGATIVE
IMM GRANULOCYTES # BLD: 0 10E9/L (ref 0–0.4)
IMM GRANULOCYTES NFR BLD: 0.4 %
KETONES UR STRIP-MCNC: NEGATIVE MG/DL
LEUKOCYTE ESTERASE UR QL STRIP: NEGATIVE
LYMPHOCYTES # BLD AUTO: 1.5 10E9/L (ref 0.8–5.3)
LYMPHOCYTES NFR BLD AUTO: 18.8 %
MCH RBC QN AUTO: 27.8 PG (ref 26.5–33)
MCHC RBC AUTO-ENTMCNC: 31.7 G/DL (ref 31.5–36.5)
MCV RBC AUTO: 88 FL (ref 78–100)
MONOCYTES # BLD AUTO: 0.7 10E9/L (ref 0–1.3)
MONOCYTES NFR BLD AUTO: 8.5 %
NEUTROPHILS # BLD AUTO: 5.6 10E9/L (ref 1.6–8.3)
NEUTROPHILS NFR BLD AUTO: 70.2 %
NITRATE UR QL: NEGATIVE
NRBC # BLD AUTO: 0 10*3/UL
NRBC BLD AUTO-RTO: 0 /100
OPIATES UR QL SCN: NEGATIVE
PH UR STRIP: 5.5 PH (ref 5–7)
PLATELET # BLD AUTO: 247 10E9/L (ref 150–450)
POTASSIUM SERPL-SCNC: 3.6 MMOL/L (ref 3.4–5.3)
PROT SERPL-MCNC: 8.4 G/DL (ref 6.8–8.8)
RBC # BLD AUTO: 4.1 10E12/L (ref 3.8–5.2)
SALICYLATES SERPL-MCNC: <2 MG/DL
SODIUM SERPL-SCNC: 139 MMOL/L (ref 133–144)
SOURCE: NORMAL
SP GR UR STRIP: 1 (ref 1–1.03)
T4 FREE SERPL-MCNC: 0.94 NG/DL (ref 0.76–1.46)
TSH SERPL DL<=0.005 MIU/L-ACNC: 6.96 MU/L (ref 0.4–4)
UROBILINOGEN UR STRIP-MCNC: NORMAL MG/DL (ref 0–2)
WBC # BLD AUTO: 8 10E9/L (ref 4–11)

## 2020-09-08 PROCEDURE — 80053 COMPREHEN METABOLIC PANEL: CPT | Performed by: EMERGENCY MEDICINE

## 2020-09-08 PROCEDURE — 81003 URINALYSIS AUTO W/O SCOPE: CPT | Performed by: EMERGENCY MEDICINE

## 2020-09-08 PROCEDURE — 93010 ELECTROCARDIOGRAM REPORT: CPT | Mod: Z6 | Performed by: EMERGENCY MEDICINE

## 2020-09-08 PROCEDURE — 99291 CRITICAL CARE FIRST HOUR: CPT | Mod: 25 | Performed by: EMERGENCY MEDICINE

## 2020-09-08 PROCEDURE — 84443 ASSAY THYROID STIM HORMONE: CPT | Performed by: EMERGENCY MEDICINE

## 2020-09-08 PROCEDURE — 80320 DRUG SCREEN QUANTALCOHOLS: CPT | Performed by: EMERGENCY MEDICINE

## 2020-09-08 PROCEDURE — 81025 URINE PREGNANCY TEST: CPT | Performed by: EMERGENCY MEDICINE

## 2020-09-08 PROCEDURE — 93005 ELECTROCARDIOGRAM TRACING: CPT | Performed by: EMERGENCY MEDICINE

## 2020-09-08 PROCEDURE — 99285 EMERGENCY DEPT VISIT HI MDM: CPT | Mod: 25 | Performed by: EMERGENCY MEDICINE

## 2020-09-08 PROCEDURE — 80329 ANALGESICS NON-OPIOID 1 OR 2: CPT | Performed by: EMERGENCY MEDICINE

## 2020-09-08 PROCEDURE — 80307 DRUG TEST PRSMV CHEM ANLYZR: CPT | Performed by: EMERGENCY MEDICINE

## 2020-09-08 PROCEDURE — 85025 COMPLETE CBC W/AUTO DIFF WBC: CPT | Performed by: EMERGENCY MEDICINE

## 2020-09-08 PROCEDURE — 84439 ASSAY OF FREE THYROXINE: CPT | Performed by: EMERGENCY MEDICINE

## 2020-09-08 ASSESSMENT — ENCOUNTER SYMPTOMS
HEADACHES: 1
FEVER: 0
HALLUCINATIONS: 0
SHORTNESS OF BREATH: 0
ABDOMINAL PAIN: 0

## 2020-09-08 ASSESSMENT — MIFFLIN-ST. JEOR: SCORE: 1894.35

## 2020-09-09 ENCOUNTER — HOSPITAL ENCOUNTER (EMERGENCY)
Facility: CLINIC | Age: 30
Discharge: LEFT BEFORE DISCHARGE | End: 2020-09-09
Payer: COMMERCIAL

## 2020-09-09 VITALS
HEIGHT: 62 IN | TEMPERATURE: 98.3 F | HEART RATE: 109 BPM | DIASTOLIC BLOOD PRESSURE: 101 MMHG | OXYGEN SATURATION: 98 % | BODY MASS INDEX: 48.83 KG/M2 | SYSTOLIC BLOOD PRESSURE: 149 MMHG | RESPIRATION RATE: 16 BRPM

## 2020-09-09 VITALS
OXYGEN SATURATION: 97 % | HEIGHT: 62 IN | DIASTOLIC BLOOD PRESSURE: 98 MMHG | TEMPERATURE: 99.6 F | SYSTOLIC BLOOD PRESSURE: 138 MMHG | WEIGHT: 267 LBS | BODY MASS INDEX: 49.13 KG/M2 | HEART RATE: 99 BPM | RESPIRATION RATE: 16 BRPM

## 2020-09-09 LAB — INTERPRETATION ECG - MUSE: NORMAL

## 2020-09-09 PROCEDURE — 90791 PSYCH DIAGNOSTIC EVALUATION: CPT

## 2020-09-09 PROCEDURE — 25000128 H RX IP 250 OP 636: Performed by: EMERGENCY MEDICINE

## 2020-09-09 RX ORDER — HEPARIN SODIUM (PORCINE) LOCK FLUSH IV SOLN 100 UNIT/ML 100 UNIT/ML
5 SOLUTION INTRAVENOUS ONCE
Status: COMPLETED | OUTPATIENT
Start: 2020-09-09 | End: 2020-09-09

## 2020-09-09 RX ADMIN — Medication 5 ML: at 02:33

## 2020-09-09 NOTE — DISCHARGE INSTRUCTIONS
Go to your therapy and DBT as planned. Work on your alternate coping skills.  Return with concerns.

## 2020-09-09 NOTE — ED NOTES
The patient was accepted at shift change signout with a plan to wait for the DEC assessment, and disposition per them.  The  did phone me and states that she feels that this is unfortunately a part of the patient's baseline, and did not feel she would benefit from hospitalization.  The patient's been adamant that this was not a suicide attempt.  Per discussion at signout the patient is now passed the window of recommended monitoring and thus does appear medically stable/clear.  Patient is asking to be discharged.  Per the discussion with the , she will be discharged home at this time.  She does have outpatient therapy and DBT already set up and is encouraged to pull through this and work on her alternate coping skills.    Dictation Disclaimer: Some of this Note has been completed with voice-recognition dictation software. Although errors are generally corrected real-time, there is the potential for a rare error to be present in the completed chart.       Oliva Cabello MD  09/09/20 0202

## 2020-09-09 NOTE — ED NOTES
"This note summarizes a safety incident in which pt fell while in bathroom. Pt is currently on 1:1 sitter. Sitter walked pt to bathroom, which this RN saw. Pt's gait was steady and pt ambulated independently to the bathroom where sitter waited outside of the bathroom. Sitter stated she heard a muffled thump and opened door to bathroom where she saw pt lying on ground. This RN rounded the corner at this time and saw sitter with pt on floor. This RN assessed pt who stated \"her toes just got caught on the ground and I fell. \"I didn't hit my head, I was able to slowly lower to the ground.\" Pt denied any pain but did say she was feeling dizzy. A wheelchair was brought over to pt who was assisted to standing position and placed in wheel chair and brought back to her room.   "

## 2020-09-09 NOTE — ED NOTES
Discussed sitter status with ED MD.  Continuous sitter is needed for patient to observe, but no suicidal concerns at this time.

## 2020-09-09 NOTE — ED NOTES
I have performed an in person assessment of the patient. Based on this assessment the patient no longer requires a one on one attendant for suicide watch at this point in time. However, she does need a sitter to watch for FB ingesting behavior.     Juan Jurado MD  11:06 PM  September 8, 2020         Juan Jurado MD  09/08/20 [id number]

## 2020-09-09 NOTE — ED TRIAGE NOTES
"Pt arrived ambulatory to triage having ingested 24 tablets of 25mg benadryl at approx 845pm. Pt endorses the overdose was intentional but was because she \"was feeling down, not because she wanted to die\". Pt denies current suicidal ideation or intent to harm self. Hypertensive in triage.   "

## 2020-09-09 NOTE — ED PROVIDER NOTES
"ED Provider Note  [facility]      History     Chief Complaint   Patient presents with     Ingestion     The history is provided by the patient and medical records.     Nevin Alvarado is a 29 year old female with a medical history significant for bipolar disorder, ADD, PTSD and frequent admissions for foreign body ingestion who presents to the Emergency Department for evaluation after an intentional overdose.  Patient reports that she took 24 tablets of Benadryl 25 mg this evening at approximately 8:45 PM.  The patient denies any suicidal ideation, but states that she was feeling depressed and overdosed in order to \"distract\" herself.  Patient denies any hallucinations.  She denies any ingestion of anything else.    Per review of patient's chart, patient has frequent admissions and Emergency Department visits for foreign body ingestions.  Patient denies any ingestion of foreign bodies today.    Per further review of patient's chart, patient was seen in the Emergency Department here yesterday after an alleged unintentional overdose on oxycodone.  Patient yesterday took 9 tablets of oxycodone 5 mg.  Patient was observed in the Emergency Department for several hours and did not have any signs of respiratory depression.    The patient here also reports that she has had chest pain and intermittent migraines for the last few days that have not resolved.  The patient reports that the migraines have been intermittent, but the chest pain has been constant.  She states that the chest pain currently is not as bad as it has been over the last few days.  Per review of patient's chart, patient was seen in the Emergency Department here on 9/5/2020 for chest pain.  Patient's EKG at that time did not show any acute changes, troponin was negative, d-dimer was mildly elevated and CTA chest did not show any evidence of PE.  The remainder of the patient's labs were normal.    Past Medical History  Past " Medical History:   Diagnosis Date     ADD (attention deficit disorder)      Anorexia nervosa with bulimia     history of; on Topamax     Anxiety      Borderline personality disorder (H)      Depression      H/O self-harm      History of pulmonary embolism 12/2019    Provoked. Completed 3 month course of Apixaban     Morbid obesity (H)      Neuropathy      PTSD (post-traumatic stress disorder)      Rectal foreign body - Recurrent issue, self placed      Swallowed foreign body - Recurrent issue, self placed      Past Surgical History:   Procedure Laterality Date     COMBINED ESOPHAGOSCOPY, GASTROSCOPY, DUODENOSCOPY (EGD), REPLACE ESOPHAGEAL STENT N/A 10/9/2019    Procedure: Upper Endoscopy with Suture Placement;  Surgeon: Zurdo Ramirez MD;  [facility]     ESOPHAGOSCOPY, GASTROSCOPY, DUODENOSCOPY (EGD), COMBINED N/A 3/9/2017    Procedure: COMBINED ESOPHAGOSCOPY, GASTROSCOPY, DUODENOSCOPY (EGD), REMOVE FOREIGN BODY;  Surgeon: Avis Guzmán MD;  [facility]     ESOPHAGOSCOPY, GASTROSCOPY, DUODENOSCOPY (EGD), COMBINED N/A 4/20/2017    Procedure: COMBINED ESOPHAGOSCOPY, GASTROSCOPY, DUODENOSCOPY (EGD), REMOVE FOREIGN BODY;  EGD removal Foregin body;  Surgeon: Lokesh Paula MD;  [facility]     ESOPHAGOSCOPY, GASTROSCOPY, DUODENOSCOPY (EGD), COMBINED N/A 6/12/2017    Procedure: COMBINED ESOPHAGOSCOPY, GASTROSCOPY, DUODENOSCOPY (EGD);  COMBINED ESOPHAGOSCOPY, GASTROSCOPY, DUODENOSCOPY (EGD) [[id number]]attempted removal of foreign body;  Surgeon: Pamela Perez MD;  [facility]     ESOPHAGOSCOPY, GASTROSCOPY, DUODENOSCOPY (EGD), COMBINED N/A 6/9/2017    Procedure: COMBINED ESOPHAGOSCOPY, GASTROSCOPY, DUODENOSCOPY (EGD), REMOVE FOREIGN BODY;  Esophagoscopy, Gastroscopy, Duodenoscopy, Removal of Foreign Body;  Surgeon: Dejon Marsh MD;  [facility]     ESOPHAGOSCOPY, GASTROSCOPY, DUODENOSCOPY (EGD), COMBINED N/A 1/6/2018    Procedure: COMBINED  ESOPHAGOSCOPY, GASTROSCOPY, DUODENOSCOPY (EGD), REMOVE FOREIGN BODY;  COMBINED ESOPHAGOSCOPY, GASTROSCOPY, DUODENOSCOPY (EGD) [by pascal net and snare with profol sedation;  Surgeon: Feliciano Emmanuel MD;  Location:  GI     ESOPHAGOSCOPY, GASTROSCOPY, DUODENOSCOPY (EGD), COMBINED N/A 3/19/2018    Procedure: COMBINED ESOPHAGOSCOPY, GASTROSCOPY, DUODENOSCOPY (EGD), REMOVE FOREIGN BODY;   Esophagodscopy, Gastroscopy, Duodenoscopy,Foreign Body Removal;  Surgeon: Brice Guzmán MD;  [facility]     ESOPHAGOSCOPY, GASTROSCOPY, DUODENOSCOPY (EGD), COMBINED N/A 4/16/2018    Procedure: COMBINED ESOPHAGOSCOPY, GASTROSCOPY, DUODENOSCOPY (EGD), REMOVE FOREIGN BODY;  Esophagogastroduodenoscopy  Foreign Body Removal X 2;  Surgeon: Royer Moise MD;  [facility]     ESOPHAGOSCOPY, GASTROSCOPY, DUODENOSCOPY (EGD), COMBINED N/A 6/1/2018    Procedure: COMBINED ESOPHAGOSCOPY, GASTROSCOPY, DUODENOSCOPY (EGD), REMOVE FOREIGN BODY;  COMBINED ESOPHAGOSCOPY, GASTROSCOPY, DUODENOSCOPY with removal of foreign body, propofol sedation by anesthesia;  Surgeon: Brice Martinez MD;  Location:  GI     ESOPHAGOSCOPY, GASTROSCOPY, DUODENOSCOPY (EGD), COMBINED N/A 7/25/2018    Procedure: COMBINED ESOPHAGOSCOPY, GASTROSCOPY, DUODENOSCOPY (EGD), REMOVE FOREIGN BODY;;  Surgeon: Candy Castelan MD;  Location:  GI     ESOPHAGOSCOPY, GASTROSCOPY, DUODENOSCOPY (EGD), COMBINED N/A 7/28/2018    Procedure: COMBINED ESOPHAGOSCOPY, GASTROSCOPY, DUODENOSCOPY (EGD), REMOVE FOREIGN BODY;  COMBINED ESOPHAGOSCOPY, GASTROSCOPY, DUODENOSCOPY (EGD), REMOVE FOREIGN BODY;  Surgeon: Brice Guzmán MD;  [facility]     ESOPHAGOSCOPY, GASTROSCOPY, DUODENOSCOPY (EGD), COMBINED N/A 7/31/2018    Procedure: COMBINED ESOPHAGOSCOPY, GASTROSCOPY, DUODENOSCOPY (EGD);  COMBINED ESOPHAGOSCOPY, GASTROSCOPY, DUODENOSCOPY (EGD) TO REMOVE FOREIGN BODY;  Surgeon: Lokesh Paula MD;  [facility]     ESOPHAGOSCOPY,  GASTROSCOPY, DUODENOSCOPY (EGD), COMBINED N/A 8/4/2018    Procedure: COMBINED ESOPHAGOSCOPY, GASTROSCOPY, DUODENOSCOPY (EGD), REMOVE FOREIGN BODY;   combined esophagoscopy, gastroscopy, duodenoscopy, REMOVE FOREIGN BODY ;  Surgeon: Lokesh Paula MD;  [facility]     ESOPHAGOSCOPY, GASTROSCOPY, DUODENOSCOPY (EGD), COMBINED N/A 10/6/2019    Procedure: ESOPHAGOGASTRODUODENOSCOPY (EGD) with fireign body removal x2, esophageal stent placement with floroscopy;  Surgeon: Timoteo Espana MD;  [facility]     ESOPHAGOSCOPY, GASTROSCOPY, DUODENOSCOPY (EGD), COMBINED N/A 12/2/2019    Procedure: Esophagogastroduodenoscopy with esophageal stent removal, esophogram;  Surgeon: Kailee Tena MD;  [facility]     ESOPHAGOSCOPY, GASTROSCOPY, DUODENOSCOPY (EGD), COMBINED N/A 12/17/2019    Procedure: ESOPHAGOGASTRODUODENOSCOPY, WITH FOREIGN BODY REMOVAL;  Surgeon: Pamela Perez MD;  [facility]     ESOPHAGOSCOPY, GASTROSCOPY, DUODENOSCOPY (EGD), COMBINED N/A 12/13/2019    Procedure: ESOPHAGOGASTRODUODENOSCOPY, WITH FOREIGN BODY REMOVAL;  Surgeon: Samia Stanton MD;  [facility]     ESOPHAGOSCOPY, GASTROSCOPY, DUODENOSCOPY (EGD), COMBINED N/A 12/28/2019    Procedure: ESOPHAGOGASTRODUODENOSCOPY (EGD) Removal of Foreign Body X 2;  Surgeon: Huy Kelley MD;  [facility]     ESOPHAGOSCOPY, GASTROSCOPY, DUODENOSCOPY (EGD), COMBINED N/A 1/5/2020    Procedure: ESOPHAGOGASTRODUOENOSCOPY WITH FOREIGN BODY REMOVAL;  Surgeon: Pamela Perez MD;  [facility]     ESOPHAGOSCOPY, GASTROSCOPY, DUODENOSCOPY (EGD), COMBINED N/A 1/3/2020    Procedure: ESOPHAGOGASTRODUODENOSCOPY (EGD) REMOVAL OF FOREIGN BODY.;  Surgeon: Pamela Perez MD;  [facility]     ESOPHAGOSCOPY, GASTROSCOPY, DUODENOSCOPY (EGD), COMBINED N/A 1/13/2020    Procedure: ESOPHAGOGASTRODUODENOSCOPY (EGD) for foreign body removal;  Surgeon: Lokesh Paula MD;  [facility]      ESOPHAGOSCOPY, GASTROSCOPY, DUODENOSCOPY (EGD), COMBINED N/A 1/18/2020    Procedure: Diagnostic ESOPHAGOGASTRODUODENOSCOPY (EGD;  Surgeon: Lokesh Paula MD;  [facility]     ESOPHAGOSCOPY, GASTROSCOPY, DUODENOSCOPY (EGD), COMBINED N/A 3/29/2020    Procedure: UPPER ENDOSCOPY WITH FOREIGN BODY REMOVAL;  Surgeon: Doug Hansen MD;  [facility]     ESOPHAGOSCOPY, GASTROSCOPY, DUODENOSCOPY (EGD), COMBINED N/A 7/11/2020    Procedure: ESOPHAGOGASTRODUODENOSCOPY (EGD); Upper Endoscopy WITH FOREIGN BODY REMOVAL;  Surgeon: Lyndsey Mendoza DO;  [facility]     ESOPHAGOSCOPY, GASTROSCOPY, DUODENOSCOPY (EGD), COMBINED N/A 7/29/2020    Procedure: ESOPHAGOGASTRODUODENOSCOPY REMOVAL OF FOREIGN BODY;  Surgeon: Samia Stanton MD;  [facility]     ESOPHAGOSCOPY, GASTROSCOPY, DUODENOSCOPY (EGD), COMBINED N/A 8/1/2020    Procedure: ESOPHAGOGASTRODUODENOSCOPY, WITH FOREIGN BODY REMOVAL;  Surgeon: Pamela Perez MD;  [facility]     ESOPHAGOSCOPY, GASTROSCOPY, DUODENOSCOPY (EGD), COMBINED N/A 8/18/2020    Procedure: ESOPHAGOGASTRODUODENOSCOPY (EGD) for foreign body removal;  Surgeon: Pamela Perez MD;  [facility]     ESOPHAGOSCOPY, GASTROSCOPY, DUODENOSCOPY (EGD), COMBINED N/A 8/27/2020    Procedure: ESOPHAGOGASTRODUODENOSCOPY (EGD) with foreign body removal;  Surgeon: Campbell Rogers MD;  [facility]     EXAM UNDER ANESTHESIA ANUS N/A 1/10/2017    Procedure: EXAM UNDER ANESTHESIA ANUS;  Surgeon: Annmarie Haynes MD;  [facility]     EXAM UNDER ANESTHESIA RECTUM N/A 7/19/2018    Procedure: EXAM UNDER ANESTHESIA RECTUM;  EXAM UNDER ANESTHESIA, REMOVAL OF RECTAL FOREIGN BODY;  Surgeon: Annmarie Haynes MD;  [facility]     HC REMOVE FECAL IMPACTION OR FB W ANESTHESIA N/A 12/18/2016    Procedure: REMOVE FECAL IMPACTION/FOREIGN BODY UNDER ANESTHESIA;  Surgeon: Soham Cano MD;  Location: [facility]     KNEE SURGERY - removed a  small tissue mass from knee Right      LAPAROSCOPIC ABLATION ENDOMETRIOSIS       LAPAROTOMY EXPLORATORY N/A 1/10/2017    Procedure: LAPAROTOMY EXPLORATORY;  Surgeon: Annmarie Haynes MD;  [facility]     LAPAROTOMY EXPLORATORY  09/11/2019    Manual manipulation of bowel to remove pill bottle in rectum     lymph nodes removed from neck; benign  age 7     MAMMOPLASTY REDUCTION Bilateral      RELEASE CARPAL TUNNEL Bilateral      SIGMOIDOSCOPY FLEXIBLE N/A 1/10/2017    Procedure: SIGMOIDOSCOPY FLEXIBLE;  Surgeon: Annmarie Haynes MD;  [facility]     SIGMOIDOSCOPY FLEXIBLE N/A 5/8/2018    Procedure: SIGMOIDOSCOPY FLEXIBLE;  flex sig with foreign body removal using snare and rattooth forcep;  Surgeon: Soham Cano MD;  Location: [facility]     SIGMOIDOSCOPY FLEXIBLE N/A 2/20/2019    Procedure: Exam under anesthesia Colonoscopy with attempted  removal of rectal foreign body;  Surgeon: Estrada Chávez MD;  [facility]     acetaminophen (TYLENOL) 32 mg/mL liquid  albuterol (PROAIR HFA/PROVENTIL HFA/VENTOLIN HFA) 108 (90 Base) MCG/ACT inhaler  busPIRone (BUSPAR) 15 MG tablet  Cholecalciferol (VITAMIN D) 50 MCG (2000 UT) CAPS  cloNIDine (CATAPRES) 0.1 MG tablet  desvenlafaxine (PRISTIQ) 100 MG 24 hr tablet  docosanol (ABREVA) 10 % CREA cream  hydroxychloroquine (PLAQUENIL) 200 MG tablet  hydrOXYzine (ATARAX) 50 MG tablet  lidocaine (XYLOCAINE) 2 % solution  lurasidone (LATUDA) 60 MG TABS tablet  metFORMIN (GLUCOPHAGE-XR) 500 MG 24 hr tablet  naltrexone (DEPADE/REVIA) 50 MG tablet  norethindrone (MICRONOR) 0.35 MG tablet  ondansetron (ZOFRAN-ODT) 4 MG ODT tab  pantoprazole (PROTONIX) 40 MG EC tablet  pregabalin (LYRICA) 50 MG capsule  Prenatal Vit-Fe Fumarate-FA (PNV PRENATAL PLUS MULTIVITAMIN) 27-1 MG TABS per tablet  topiramate (TOPAMAX) 100 MG tablet      Allergies   Allergen Reactions     Amoxicillin-Pot Clavulanate Other (See Comments), Swelling and Rash     PN: facial swelling, left side.  Also had cortisone injection the same day as she started the Augmentin.  Noted in downtime recovery of chart.    PN: facial swelling, left side. Also had cortisone injection the same day as she started the Augmentin.; HUT Comment: PN: facial swelling, left side. Also had cortisone injection the same day as she started the Augmentin.Noted in downtime recovery of chart.; HUT Reaction: Rash; HUT Reaction: Unknown; HUT Reaction Type: Allergy; HUT Severity: Med; HUT Noted: [id number]     Oseltamivir Hives     med stopped, PN: med stopped  med stopped, PN: med stopped; HUT Comment: med stopped, PN: med stopped; HUT Reaction: Hives; HUT Reaction Type: Allergy; HUT Severity: Med; HUT Noted: [id number]     Penicillins Anaphylaxis     HUT Reaction: Anaphylaxis; HUT Reaction Type: Allergy; HUT Severity: High; HUT Noted: [id number]     Vancomycin Itching, Swelling and Rash     Other reaction(s): Redness  Flushed face, very itchy; HUT Comment: Flushed face, very itchy; HUT Reaction: Angioedema; HUT Reaction: Redness; HUT Severity: Med; HUT Noted: [id number]    facial     Hydrocodone Nausea and Vomiting and GI Disturbance     vomiting for days, PN: vomiting for days; HUT Comment: vomiting for days; HUT Reaction: Gastrointestinal; HUT Reaction: Nausea And Vomiting; HUT Reaction Type: Intolerance; HUT Severity: Med; HUT Noted: [id number]  vomiting for days       Blood-Group Specific Substance Other (See Comments)     Patient has an anti-Cw and non-specific antibodies. Blood product orders may be delayed. Draw one red top and two purple top tubes for all type/screen/crossmatch orders.  Patient has anti-Cw, anti-K ([place]), Warm auto and nonspecific antibodies. Blood products may be delayed. Draw patient 24 hours prior to transfusion. Draw one red top and two purple top tubes for all type and screen orders.     Cephalosporins Rash     Influenza Vaccines Other (See Comments) and Nausea and Vomiting     HUT Reaction: Nausea And Vomiting; HUT  "Reaction Type: Intolerance; HUT Severity: Low; HUT Noted: [id number]     Lamotrigine Rash     Possibly associated with Lamictal.   HUT Comment: Possibly associated with Lamictal. ; HUT Reaction: Rash; HUT Reaction Type: Allergy; HUT Severity: Low; HUT Noted: [id number]     Latex Rash     HUT Reaction: Rash; HUT Reaction Type: Allergy; HUT Severity: Low; HUT Noted: [id number]     Family History  Family History   Problem Relation Age of Onset     Diabetes Type 2  Maternal Grandmother      Diabetes Type 2  Paternal Grandmother      Breast Cancer Paternal Grandmother      Cerebrovascular Disease Father 53     Myocardial Infarction No family hx of      Coronary Artery Disease Early Onset No family hx of      Ovarian Cancer No family hx of      Colon Cancer No family hx of      Social History   Social History     Tobacco Use     Smoking status: Never Smoker     Smokeless tobacco: Never Used   Substance Use Topics     Alcohol use: No     Alcohol/week: 0.0 standard drinks     Drug use: No      Past medical history, past surgical history, medications, allergies, family history, and social history were reviewed with the patient. No additional pertinent items.       Review of Systems   Constitutional: Negative for fever.   Respiratory: Negative for shortness of breath.    Cardiovascular: Positive for chest pain (constant; improved currently).   Gastrointestinal: Negative for abdominal pain.   Neurological: Positive for headaches (intermittent migraines).   Psychiatric/Behavioral: Positive for self-injury. Negative for hallucinations and suicidal ideas.   All other systems reviewed and are negative.      Physical Exam   BP: (!) 203/120  Pulse: 98  Temp: 99.6  F (37.6  C)  Resp: 18  Height: 157.5 cm (5' 2\")  Weight: 121.1 kg (267 lb)  SpO2: 97 %  Physical Exam  Vitals signs and nursing note reviewed.   Constitutional:       General: She is not in acute distress.     Appearance: Normal appearance. She is not diaphoretic.   HENT:      " Head: Atraumatic.      Mouth/Throat:      Pharynx: No oropharyngeal exudate.   Eyes:      General: No scleral icterus.     Pupils: Pupils are equal, round, and reactive to light.   Cardiovascular:      Rate and Rhythm: Tachycardia present.      Heart sounds: Normal heart sounds.   Pulmonary:      Effort: No respiratory distress.      Breath sounds: Normal breath sounds.   Abdominal:      General: Bowel sounds are normal.      Palpations: Abdomen is soft.      Tenderness: There is no abdominal tenderness.   Musculoskeletal:         General: No tenderness.   Skin:     General: Skin is warm.      Findings: No rash.   Neurological:      General: No focal deficit present.      Mental Status: She is alert and oriented to person, place, and time.           ED Course      Procedures     10:11 PM  The patient was seen and examined by Juan Jurado MD in Room ED15.                EKG Interpretation:      Interpreted by Juan Jurado MD  Time reviewed: perEpic  Symptoms at time of EKG: none   Rhythm: normal sinus   Rate: normal  Axis: normal  Ectopy: none  Conduction: normal  ST Segments/ T Waves: No ST-T wave changes  Q Waves: none  Comparison to prior: Unchanged    Clinical Impression: normal EKG             Critical Care Addendum    My initial assessment, based on my review of vital signs, established that Nevin Alvarado has severe toxic ingestion, which requires immediate intervention, and therefore she is critically ill.     After the initial assessment, the care team initiated IV fluid administration to provide stabilization care. Due to the critical nature of this patient, I reassessed vital signs multiple times prior to her disposition.     Time also spent performing discussion with consultants.     Critical care time (excluding teaching time and procedures): 40 minutes.            No results found for any visits on 09/08/20.  Medications - No data to display     Assessments & Plan (with Medical Decision Making)      29 year old female with a medical history significant for bipolar disorder, ADD, PTSD and frequent admissions for foreign body ingestion who presents to the Emergency Department for evaluation after an intentional overdose with approximately 600 mg of diphenhydramine.  Patient placed on cardiac monitor which revealed sinus tachycardia with heart rates in the low 100s and slightly elevated blood pressure in the 160/100 range.  IV established, labs drawn sent reviewed and document in epic essentially all unremarkable with negative UDS, acetaminophen and salicylate levels undetectable, normal CBC and electrolytes and normal TSH.  EKG obtained which revealed normal sinus rhythm without any acute ischemic changes.  Patient given a liter of normal saline IV fluid bolus, case discussed with toxicology who advised observation for about 4 hours after ingestion prior to medical clearance.  Upon repeat assessment 3 and half hours later patient's heart rate had improved and she remained asymptomatic.  Rate department.  Arrangements made for video crisis counselor assessment and patient be signed out to overnight staff pending recommendations and disposition accordingly..    I have reviewed the nursing notes. I have reviewed the findings, diagnosis, plan and need for follow up with the patient.    New Prescriptions    No medications on file       Final diagnoses:   None       --  IMarcos, am serving as a trained medical scribe to document services personally performed by Juan Jurado MD, based on the provider's statements to me.     IJuan MD, was physically present and have reviewed and verified the accuracy of this note documented by Marcos Askew.    Juan Jurado MD  [facility], EMERGENCY DEPARTMENT  9/8/2020     Juan Jurado MD  09/09/20 0027

## 2020-09-10 LAB — INTERPRETATION ECG - MUSE: NORMAL

## 2020-09-10 NOTE — ED NOTES
Per JOSEPH RN patient LWBS after triage. Declination form not signed. Patient denied ingestion at this encounter.

## 2020-09-10 NOTE — ED TRIAGE NOTES
Patient BIBA after feeling weak at home.  Patient was discharge today and said they started feeling weaker and called EMS.

## 2020-09-18 ENCOUNTER — HOSPITAL ENCOUNTER (INPATIENT)
Facility: CLINIC | Age: 30
LOS: 1 days | Discharge: HOME | DRG: 885 | End: 2020-09-20
Attending: EMERGENCY MEDICINE | Admitting: PSYCHIATRY & NEUROLOGY
Payer: COMMERCIAL

## 2020-09-18 ENCOUNTER — APPOINTMENT (OUTPATIENT)
Dept: GENERAL RADIOLOGY | Facility: CLINIC | Age: 30
DRG: 885 | End: 2020-09-18
Attending: EMERGENCY MEDICINE
Payer: COMMERCIAL

## 2020-09-18 ENCOUNTER — HOSPITAL ENCOUNTER (INPATIENT)
Age: 30
End: 2020-09-18
Payer: COMMERCIAL

## 2020-09-18 ENCOUNTER — ANESTHESIA EVENT (OUTPATIENT)
Dept: SURGERY | Facility: CLINIC | Age: 30
DRG: 885 | End: 2020-09-18
Payer: COMMERCIAL

## 2020-09-18 ENCOUNTER — TELEPHONE (OUTPATIENT)
Dept: BEHAVIORAL HEALTH | Facility: CLINIC | Age: 30
End: 2020-09-18

## 2020-09-18 ENCOUNTER — ANESTHESIA (OUTPATIENT)
Dept: SURGERY | Facility: CLINIC | Age: 30
DRG: 885 | End: 2020-09-18
Payer: COMMERCIAL

## 2020-09-18 DIAGNOSIS — Z20.828 EXPOSURE TO SARS-ASSOCIATED CORONAVIRUS: ICD-10-CM

## 2020-09-18 DIAGNOSIS — F33.9 RECURRENT MAJOR DEPRESSIVE DISORDER, REMISSION STATUS UNSPECIFIED (H): ICD-10-CM

## 2020-09-18 DIAGNOSIS — F29 PSYCHOSIS, UNSPECIFIED PSYCHOSIS TYPE (H): ICD-10-CM

## 2020-09-18 DIAGNOSIS — T18.9XXA FOREIGN BODY IN DIGESTIVE TRACT, INITIAL ENCOUNTER: ICD-10-CM

## 2020-09-18 DIAGNOSIS — F41.1 GENERALIZED ANXIETY DISORDER: ICD-10-CM

## 2020-09-18 LAB
ANION GAP SERPL CALCULATED.3IONS-SCNC: 7 MMOL/L (ref 3–14)
B-HCG SERPL-ACNC: <1 IU/L (ref 0–5)
BASOPHILS # BLD AUTO: 0 10E9/L (ref 0–0.2)
BASOPHILS NFR BLD AUTO: 0.3 %
BUN SERPL-MCNC: 11 MG/DL (ref 7–30)
CALCIUM SERPL-MCNC: 9 MG/DL (ref 8.5–10.1)
CHLORIDE SERPL-SCNC: 105 MMOL/L (ref 94–109)
CO2 SERPL-SCNC: 25 MMOL/L (ref 20–32)
CREAT SERPL-MCNC: 0.55 MG/DL (ref 0.52–1.04)
DIFFERENTIAL METHOD BLD: ABNORMAL
EOSINOPHIL # BLD AUTO: 0.1 10E9/L (ref 0–0.7)
EOSINOPHIL NFR BLD AUTO: 1.1 %
ERYTHROCYTE [DISTWIDTH] IN BLOOD BY AUTOMATED COUNT: 13.8 % (ref 10–15)
GFR SERPL CREATININE-BSD FRML MDRD: >90 ML/MIN/{1.73_M2}
GLUCOSE BLDC GLUCOMTR-MCNC: 95 MG/DL (ref 70–99)
GLUCOSE SERPL-MCNC: 97 MG/DL (ref 70–99)
HCT VFR BLD AUTO: 38.6 % (ref 35–47)
HGB BLD-MCNC: 12.2 G/DL (ref 11.7–15.7)
IMM GRANULOCYTES # BLD: 0 10E9/L (ref 0–0.4)
IMM GRANULOCYTES NFR BLD: 0.3 %
LABORATORY COMMENT REPORT: NORMAL
LYMPHOCYTES # BLD AUTO: 1.7 10E9/L (ref 0.8–5.3)
LYMPHOCYTES NFR BLD AUTO: 15.2 %
MCH RBC QN AUTO: 27.8 PG (ref 26.5–33)
MCHC RBC AUTO-ENTMCNC: 31.6 G/DL (ref 31.5–36.5)
MCV RBC AUTO: 88 FL (ref 78–100)
MONOCYTES # BLD AUTO: 0.6 10E9/L (ref 0–1.3)
MONOCYTES NFR BLD AUTO: 5 %
NEUTROPHILS # BLD AUTO: 8.6 10E9/L (ref 1.6–8.3)
NEUTROPHILS NFR BLD AUTO: 78.1 %
NRBC # BLD AUTO: 0 10*3/UL
NRBC BLD AUTO-RTO: 0 /100
PLATELET # BLD AUTO: 301 10E9/L (ref 150–450)
POTASSIUM SERPL-SCNC: 3.9 MMOL/L (ref 3.4–5.3)
RBC # BLD AUTO: 4.39 10E12/L (ref 3.8–5.2)
SARS-COV-2 RNA SPEC QL NAA+PROBE: NEGATIVE
SARS-COV-2 RNA SPEC QL NAA+PROBE: NORMAL
SODIUM SERPL-SCNC: 138 MMOL/L (ref 133–144)
SPECIMEN SOURCE: NORMAL
SPECIMEN SOURCE: NORMAL
UPPER GI ENDOSCOPY: NORMAL
WBC # BLD AUTO: 11 10E9/L (ref 4–11)

## 2020-09-18 PROCEDURE — 36000053 ZZH SURGERY LEVEL 2 EA 15 ADDTL MIN - UMMC: Performed by: STUDENT IN AN ORGANIZED HEALTH CARE EDUCATION/TRAINING PROGRAM

## 2020-09-18 PROCEDURE — 25800030 ZZH RX IP 258 OP 636: Performed by: EMERGENCY MEDICINE

## 2020-09-18 PROCEDURE — 36415 COLL VENOUS BLD VENIPUNCTURE: CPT | Performed by: EMERGENCY MEDICINE

## 2020-09-18 PROCEDURE — 71000014 ZZH RECOVERY PHASE 1 LEVEL 2 FIRST HR: Performed by: STUDENT IN AN ORGANIZED HEALTH CARE EDUCATION/TRAINING PROGRAM

## 2020-09-18 PROCEDURE — 25000566 ZZH SEVOFLURANE, EA 15 MIN: Performed by: STUDENT IN AN ORGANIZED HEALTH CARE EDUCATION/TRAINING PROGRAM

## 2020-09-18 PROCEDURE — 37000008 ZZH ANESTHESIA TECHNICAL FEE, 1ST 30 MIN: Performed by: STUDENT IN AN ORGANIZED HEALTH CARE EDUCATION/TRAINING PROGRAM

## 2020-09-18 PROCEDURE — 74019 RADEX ABDOMEN 2 VIEWS: CPT

## 2020-09-18 PROCEDURE — 99285 EMERGENCY DEPT VISIT HI MDM: CPT | Mod: Z6 | Performed by: EMERGENCY MEDICINE

## 2020-09-18 PROCEDURE — 36000051 ZZH SURGERY LEVEL 2 1ST 30 MIN - UMMC: Performed by: STUDENT IN AN ORGANIZED HEALTH CARE EDUCATION/TRAINING PROGRAM

## 2020-09-18 PROCEDURE — 25000125 ZZHC RX 250: Performed by: NURSE ANESTHETIST, CERTIFIED REGISTERED

## 2020-09-18 PROCEDURE — U0003 INFECTIOUS AGENT DETECTION BY NUCLEIC ACID (DNA OR RNA); SEVERE ACUTE RESPIRATORY SYNDROME CORONAVIRUS 2 (SARS-COV-2) (CORONAVIRUS DISEASE [COVID-19]), AMPLIFIED PROBE TECHNIQUE, MAKING USE OF HIGH THROUGHPUT TECHNOLOGIES AS DESCRIBED BY CMS-2020-01-R: HCPCS | Performed by: EMERGENCY MEDICINE

## 2020-09-18 PROCEDURE — 37000009 ZZH ANESTHESIA TECHNICAL FEE, EACH ADDTL 15 MIN: Performed by: STUDENT IN AN ORGANIZED HEALTH CARE EDUCATION/TRAINING PROGRAM

## 2020-09-18 PROCEDURE — 0DC68ZZ EXTIRPATION OF MATTER FROM STOMACH, VIA NATURAL OR ARTIFICIAL OPENING ENDOSCOPIC: ICD-10-PCS | Performed by: STUDENT IN AN ORGANIZED HEALTH CARE EDUCATION/TRAINING PROGRAM

## 2020-09-18 PROCEDURE — 99285 EMERGENCY DEPT VISIT HI MDM: CPT | Mod: 25 | Performed by: EMERGENCY MEDICINE

## 2020-09-18 PROCEDURE — 25000128 H RX IP 250 OP 636: Performed by: NURSE ANESTHETIST, CERTIFIED REGISTERED

## 2020-09-18 PROCEDURE — 80048 BASIC METABOLIC PNL TOTAL CA: CPT | Performed by: EMERGENCY MEDICINE

## 2020-09-18 PROCEDURE — 25000132 ZZH RX MED GY IP 250 OP 250 PS 637: Performed by: EMERGENCY MEDICINE

## 2020-09-18 PROCEDURE — 90791 PSYCH DIAGNOSTIC EVALUATION: CPT

## 2020-09-18 PROCEDURE — 85025 COMPLETE CBC W/AUTO DIFF WBC: CPT | Performed by: EMERGENCY MEDICINE

## 2020-09-18 PROCEDURE — 96361 HYDRATE IV INFUSION ADD-ON: CPT | Performed by: EMERGENCY MEDICINE

## 2020-09-18 PROCEDURE — 71046 X-RAY EXAM CHEST 2 VIEWS: CPT

## 2020-09-18 PROCEDURE — 25000128 H RX IP 250 OP 636: Performed by: ANESTHESIOLOGY

## 2020-09-18 PROCEDURE — 96374 THER/PROPH/DIAG INJ IV PUSH: CPT | Performed by: EMERGENCY MEDICINE

## 2020-09-18 PROCEDURE — 25000125 ZZHC RX 250: Performed by: STUDENT IN AN ORGANIZED HEALTH CARE EDUCATION/TRAINING PROGRAM

## 2020-09-18 PROCEDURE — 84702 CHORIONIC GONADOTROPIN TEST: CPT | Performed by: EMERGENCY MEDICINE

## 2020-09-18 PROCEDURE — 00000146 ZZHCL STATISTIC GLUCOSE BY METER IP

## 2020-09-18 PROCEDURE — C9803 HOPD COVID-19 SPEC COLLECT: HCPCS | Performed by: EMERGENCY MEDICINE

## 2020-09-18 RX ORDER — FENTANYL CITRATE 50 UG/ML
INJECTION, SOLUTION INTRAMUSCULAR; INTRAVENOUS PRN
Status: DISCONTINUED | OUTPATIENT
Start: 2020-09-18 | End: 2020-09-18

## 2020-09-18 RX ORDER — HYDROMORPHONE HYDROCHLORIDE 1 MG/ML
.3-.5 INJECTION, SOLUTION INTRAMUSCULAR; INTRAVENOUS; SUBCUTANEOUS EVERY 10 MIN PRN
Status: DISCONTINUED | OUTPATIENT
Start: 2020-09-18 | End: 2020-09-18 | Stop reason: HOSPADM

## 2020-09-18 RX ORDER — SODIUM CHLORIDE, SODIUM LACTATE, POTASSIUM CHLORIDE, CALCIUM CHLORIDE 600; 310; 30; 20 MG/100ML; MG/100ML; MG/100ML; MG/100ML
INJECTION, SOLUTION INTRAVENOUS CONTINUOUS
Status: DISCONTINUED | OUTPATIENT
Start: 2020-09-18 | End: 2020-09-18 | Stop reason: HOSPADM

## 2020-09-18 RX ORDER — SODIUM CHLORIDE 9 MG/ML
INJECTION, SOLUTION INTRAVENOUS CONTINUOUS
Status: DISCONTINUED | OUTPATIENT
Start: 2020-09-18 | End: 2020-09-19

## 2020-09-18 RX ORDER — PROPOFOL 10 MG/ML
INJECTION, EMULSION INTRAVENOUS PRN
Status: DISCONTINUED | OUTPATIENT
Start: 2020-09-18 | End: 2020-09-18

## 2020-09-18 RX ORDER — NALOXONE HYDROCHLORIDE 0.4 MG/ML
.1-.4 INJECTION, SOLUTION INTRAMUSCULAR; INTRAVENOUS; SUBCUTANEOUS
Status: DISCONTINUED | OUTPATIENT
Start: 2020-09-18 | End: 2020-09-18 | Stop reason: HOSPADM

## 2020-09-18 RX ORDER — ONDANSETRON 2 MG/ML
4 INJECTION INTRAMUSCULAR; INTRAVENOUS EVERY 30 MIN PRN
Status: DISCONTINUED | OUTPATIENT
Start: 2020-09-18 | End: 2020-09-18 | Stop reason: HOSPADM

## 2020-09-18 RX ORDER — DEXAMETHASONE SODIUM PHOSPHATE 4 MG/ML
INJECTION, SOLUTION INTRA-ARTICULAR; INTRALESIONAL; INTRAMUSCULAR; INTRAVENOUS; SOFT TISSUE PRN
Status: DISCONTINUED | OUTPATIENT
Start: 2020-09-18 | End: 2020-09-18

## 2020-09-18 RX ORDER — ACETAMINOPHEN 500 MG
1000 TABLET ORAL ONCE
Status: COMPLETED | OUTPATIENT
Start: 2020-09-18 | End: 2020-09-18

## 2020-09-18 RX ORDER — FENTANYL CITRATE 50 UG/ML
25-50 INJECTION, SOLUTION INTRAMUSCULAR; INTRAVENOUS
Status: DISCONTINUED | OUTPATIENT
Start: 2020-09-18 | End: 2020-09-18 | Stop reason: HOSPADM

## 2020-09-18 RX ORDER — ONDANSETRON 2 MG/ML
INJECTION INTRAMUSCULAR; INTRAVENOUS PRN
Status: DISCONTINUED | OUTPATIENT
Start: 2020-09-18 | End: 2020-09-18

## 2020-09-18 RX ORDER — ONDANSETRON 4 MG/1
4 TABLET, ORALLY DISINTEGRATING ORAL EVERY 30 MIN PRN
Status: DISCONTINUED | OUTPATIENT
Start: 2020-09-18 | End: 2020-09-18 | Stop reason: HOSPADM

## 2020-09-18 RX ORDER — LIDOCAINE HYDROCHLORIDE 20 MG/ML
INJECTION, SOLUTION INFILTRATION; PERINEURAL PRN
Status: DISCONTINUED | OUTPATIENT
Start: 2020-09-18 | End: 2020-09-18

## 2020-09-18 RX ORDER — KETOROLAC TROMETHAMINE 15 MG/ML
15 INJECTION, SOLUTION INTRAMUSCULAR; INTRAVENOUS ONCE
Status: COMPLETED | OUTPATIENT
Start: 2020-09-18 | End: 2020-09-19

## 2020-09-18 RX ADMIN — SODIUM CHLORIDE 1000 ML: 9 INJECTION, SOLUTION INTRAVENOUS at 17:37

## 2020-09-18 RX ADMIN — ROCURONIUM BROMIDE 10 MG: 10 INJECTION INTRAVENOUS at 20:35

## 2020-09-18 RX ADMIN — DEXAMETHASONE SODIUM PHOSPHATE 8 MG: 4 INJECTION, SOLUTION INTRA-ARTICULAR; INTRALESIONAL; INTRAMUSCULAR; INTRAVENOUS; SOFT TISSUE at 20:45

## 2020-09-18 RX ADMIN — MIDAZOLAM 2 MG: 1 INJECTION INTRAMUSCULAR; INTRAVENOUS at 20:36

## 2020-09-18 RX ADMIN — SODIUM CHLORIDE: 9 INJECTION, SOLUTION INTRAVENOUS at 20:28

## 2020-09-18 RX ADMIN — LIDOCAINE HYDROCHLORIDE 100 MG: 20 INJECTION, SOLUTION INFILTRATION; PERINEURAL at 20:36

## 2020-09-18 RX ADMIN — FENTANYL CITRATE 100 MCG: 50 INJECTION, SOLUTION INTRAMUSCULAR; INTRAVENOUS at 20:35

## 2020-09-18 RX ADMIN — HYDROMORPHONE HYDROCHLORIDE 0.5 MG: 1 INJECTION, SOLUTION INTRAMUSCULAR; INTRAVENOUS; SUBCUTANEOUS at 21:38

## 2020-09-18 RX ADMIN — ACETAMINOPHEN 1000 MG: 500 TABLET, FILM COATED ORAL at 18:37

## 2020-09-18 RX ADMIN — ONDANSETRON 4 MG: 2 INJECTION INTRAMUSCULAR; INTRAVENOUS at 20:45

## 2020-09-18 RX ADMIN — HYDROMORPHONE HYDROCHLORIDE 0.5 MG: 1 INJECTION, SOLUTION INTRAMUSCULAR; INTRAVENOUS; SUBCUTANEOUS at 21:40

## 2020-09-18 RX ADMIN — PROPOFOL 200 MG: 10 INJECTION, EMULSION INTRAVENOUS at 20:36

## 2020-09-18 RX ADMIN — ROCURONIUM BROMIDE 10 MG: 10 INJECTION INTRAVENOUS at 20:45

## 2020-09-18 RX ADMIN — Medication 100 MG: at 20:36

## 2020-09-18 NOTE — ED PROVIDER NOTES
ED Provider Note  [facility]      History     Chief Complaint   Patient presents with     Ingestion     swallowed paperclip 10 minutes ago     HPI  Nevin Alvarado is a 29 year old female with a PMH for bipolar disorder, ADD, PTSD and frequent admissions for foreign body ingestion who presents to the ED with complaint of ingestion of a foreign object.    Patient reports she swallowed a paperclip in the lobby just prior to checking into the emergency department.  She said she had thoughts of self-harm, was coming to the emergency department to be seen for these thoughts but then swallowed a paperclip prior to being evaluated.  Patient states they recently stopped her hydroxyzine 25 mg yesterday and feels that this is contributing to her symptoms.  She now endorses some nausea and mild abdominal discomfort.  She is now requesting admission for management of her psychiatric meds.  She reports she last ate 1 hour ago.    Denies chest pain, fever, cough, shortness of breath.    Past Medical History  Past Medical History:   Diagnosis Date     ADD (attention deficit disorder)      Anorexia nervosa with bulimia     history of; on Topamax     Anxiety      Borderline personality disorder (H)      Depression      H/O self-harm      History of pulmonary embolism 12/2019    Provoked. Completed 3 month course of Apixaban     Morbid obesity (H)      Neuropathy      PTSD (post-traumatic stress disorder)      Rectal foreign body - Recurrent issue, self placed      Swallowed foreign body - Recurrent issue, self placed      Past Surgical History:   Procedure Laterality Date     COMBINED ESOPHAGOSCOPY, GASTROSCOPY, DUODENOSCOPY (EGD), REPLACE ESOPHAGEAL STENT N/A 10/9/2019    Procedure: Upper Endoscopy with Suture Placement;  Surgeon: Zurdo Ramirez MD;  [facility]     ESOPHAGOSCOPY, GASTROSCOPY, DUODENOSCOPY (EGD), COMBINED N/A 3/9/2017    Procedure: COMBINED ESOPHAGOSCOPY, GASTROSCOPY,  DUODENOSCOPY (EGD), REMOVE FOREIGN BODY;  Surgeon: Avis Guzmán MD;  [facility]     ESOPHAGOSCOPY, GASTROSCOPY, DUODENOSCOPY (EGD), COMBINED N/A 4/20/2017    Procedure: COMBINED ESOPHAGOSCOPY, GASTROSCOPY, DUODENOSCOPY (EGD), REMOVE FOREIGN BODY;  EGD removal Foregin body;  Surgeon: Lokesh Paula MD;  [facility]     ESOPHAGOSCOPY, GASTROSCOPY, DUODENOSCOPY (EGD), COMBINED N/A 6/12/2017    Procedure: COMBINED ESOPHAGOSCOPY, GASTROSCOPY, DUODENOSCOPY (EGD);  COMBINED ESOPHAGOSCOPY, GASTROSCOPY, DUODENOSCOPY (EGD) [[id number]]attempted removal of foreign body;  Surgeon: Pamela Perez MD;  [facility]     ESOPHAGOSCOPY, GASTROSCOPY, DUODENOSCOPY (EGD), COMBINED N/A 6/9/2017    Procedure: COMBINED ESOPHAGOSCOPY, GASTROSCOPY, DUODENOSCOPY (EGD), REMOVE FOREIGN BODY;  Esophagoscopy, Gastroscopy, Duodenoscopy, Removal of Foreign Body;  Surgeon: Dejon Marsh MD;  [facility]     ESOPHAGOSCOPY, GASTROSCOPY, DUODENOSCOPY (EGD), COMBINED N/A 1/6/2018    Procedure: COMBINED ESOPHAGOSCOPY, GASTROSCOPY, DUODENOSCOPY (EGD), REMOVE FOREIGN BODY;  COMBINED ESOPHAGOSCOPY, GASTROSCOPY, DUODENOSCOPY (EGD) [by pascal net and snare with profol sedation;  Surgeon: Feliciano Emmanuel MD;  Location:  GI     ESOPHAGOSCOPY, GASTROSCOPY, DUODENOSCOPY (EGD), COMBINED N/A 3/19/2018    Procedure: COMBINED ESOPHAGOSCOPY, GASTROSCOPY, DUODENOSCOPY (EGD), REMOVE FOREIGN BODY;   Esophagodscopy, Gastroscopy, Duodenoscopy,Foreign Body Removal;  Surgeon: Brice Guzmán MD;  [facility]     ESOPHAGOSCOPY, GASTROSCOPY, DUODENOSCOPY (EGD), COMBINED N/A 4/16/2018    Procedure: COMBINED ESOPHAGOSCOPY, GASTROSCOPY, DUODENOSCOPY (EGD), REMOVE FOREIGN BODY;  Esophagogastroduodenoscopy  Foreign Body Removal X 2;  Surgeon: Royer Moise MD;  [facility]     ESOPHAGOSCOPY, GASTROSCOPY, DUODENOSCOPY (EGD), COMBINED N/A 6/1/2018    Procedure: COMBINED ESOPHAGOSCOPY,  GASTROSCOPY, DUODENOSCOPY (EGD), REMOVE FOREIGN BODY;  COMBINED ESOPHAGOSCOPY, GASTROSCOPY, DUODENOSCOPY with removal of foreign body, propofol sedation by anesthesia;  Surgeon: Brice Martinez MD;  Location:  GI     ESOPHAGOSCOPY, GASTROSCOPY, DUODENOSCOPY (EGD), COMBINED N/A 7/25/2018    Procedure: COMBINED ESOPHAGOSCOPY, GASTROSCOPY, DUODENOSCOPY (EGD), REMOVE FOREIGN BODY;;  Surgeon: Candy Castelan MD;  Location:  GI     ESOPHAGOSCOPY, GASTROSCOPY, DUODENOSCOPY (EGD), COMBINED N/A 7/28/2018    Procedure: COMBINED ESOPHAGOSCOPY, GASTROSCOPY, DUODENOSCOPY (EGD), REMOVE FOREIGN BODY;  COMBINED ESOPHAGOSCOPY, GASTROSCOPY, DUODENOSCOPY (EGD), REMOVE FOREIGN BODY;  Surgeon: Brice Guzmán MD;  [facility]     ESOPHAGOSCOPY, GASTROSCOPY, DUODENOSCOPY (EGD), COMBINED N/A 7/31/2018    Procedure: COMBINED ESOPHAGOSCOPY, GASTROSCOPY, DUODENOSCOPY (EGD);  COMBINED ESOPHAGOSCOPY, GASTROSCOPY, DUODENOSCOPY (EGD) TO REMOVE FOREIGN BODY;  Surgeon: Lokesh Paula MD;  [facility]     ESOPHAGOSCOPY, GASTROSCOPY, DUODENOSCOPY (EGD), COMBINED N/A 8/4/2018    Procedure: COMBINED ESOPHAGOSCOPY, GASTROSCOPY, DUODENOSCOPY (EGD), REMOVE FOREIGN BODY;   combined esophagoscopy, gastroscopy, duodenoscopy, REMOVE FOREIGN BODY ;  Surgeon: Lokesh Paula MD;  [facility]     ESOPHAGOSCOPY, GASTROSCOPY, DUODENOSCOPY (EGD), COMBINED N/A 10/6/2019    Procedure: ESOPHAGOGASTRODUODENOSCOPY (EGD) with fireign body removal x2, esophageal stent placement with floroscopy;  Surgeon: Timoteo Espana MD;  [facility]     ESOPHAGOSCOPY, GASTROSCOPY, DUODENOSCOPY (EGD), COMBINED N/A 12/2/2019    Procedure: Esophagogastroduodenoscopy with esophageal stent removal, esophogram;  Surgeon: Kailee Tena MD;  [facility]     ESOPHAGOSCOPY, GASTROSCOPY, DUODENOSCOPY (EGD), COMBINED N/A 12/17/2019    Procedure: ESOPHAGOGASTRODUODENOSCOPY, WITH FOREIGN BODY REMOVAL;  Surgeon: Pamela Perez  MD Krystin;  [facility]     ESOPHAGOSCOPY, GASTROSCOPY, DUODENOSCOPY (EGD), COMBINED N/A 12/13/2019    Procedure: ESOPHAGOGASTRODUODENOSCOPY, WITH FOREIGN BODY REMOVAL;  Surgeon: Samia Stanton MD;  [facility]     ESOPHAGOSCOPY, GASTROSCOPY, DUODENOSCOPY (EGD), COMBINED N/A 12/28/2019    Procedure: ESOPHAGOGASTRODUODENOSCOPY (EGD) Removal of Foreign Body X 2;  Surgeon: Huy Kelley MD;  [facility]     ESOPHAGOSCOPY, GASTROSCOPY, DUODENOSCOPY (EGD), COMBINED N/A 1/5/2020    Procedure: ESOPHAGOGASTRODUOENOSCOPY WITH FOREIGN BODY REMOVAL;  Surgeon: Pamela Perez MD;  [facility]     ESOPHAGOSCOPY, GASTROSCOPY, DUODENOSCOPY (EGD), COMBINED N/A 1/3/2020    Procedure: ESOPHAGOGASTRODUODENOSCOPY (EGD) REMOVAL OF FOREIGN BODY.;  Surgeon: Pamela Perez MD;  [facility]     ESOPHAGOSCOPY, GASTROSCOPY, DUODENOSCOPY (EGD), COMBINED N/A 1/13/2020    Procedure: ESOPHAGOGASTRODUODENOSCOPY (EGD) for foreign body removal;  Surgeon: Lokesh Paula MD;  [facility]     ESOPHAGOSCOPY, GASTROSCOPY, DUODENOSCOPY (EGD), COMBINED N/A 1/18/2020    Procedure: Diagnostic ESOPHAGOGASTRODUODENOSCOPY (EGD;  Surgeon: Lokesh Paula MD;  [facility]     ESOPHAGOSCOPY, GASTROSCOPY, DUODENOSCOPY (EGD), COMBINED N/A 3/29/2020    Procedure: UPPER ENDOSCOPY WITH FOREIGN BODY REMOVAL;  Surgeon: Doug Hansen MD;  [facility]     ESOPHAGOSCOPY, GASTROSCOPY, DUODENOSCOPY (EGD), COMBINED N/A 7/11/2020    Procedure: ESOPHAGOGASTRODUODENOSCOPY (EGD); Upper Endoscopy WITH FOREIGN BODY REMOVAL;  Surgeon: Lyndsey Mendoza DO;  [facility]     ESOPHAGOSCOPY, GASTROSCOPY, DUODENOSCOPY (EGD), COMBINED N/A 7/29/2020    Procedure: ESOPHAGOGASTRODUODENOSCOPY REMOVAL OF FOREIGN BODY;  Surgeon: Samia Stanton MD;  [facility]     ESOPHAGOSCOPY, GASTROSCOPY, DUODENOSCOPY (EGD), COMBINED N/A 8/1/2020    Procedure: ESOPHAGOGASTRODUODENOSCOPY, WITH FOREIGN BODY  REMOVAL;  Surgeon: Pamela Perez MD;  [facility]     ESOPHAGOSCOPY, GASTROSCOPY, DUODENOSCOPY (EGD), COMBINED N/A 8/18/2020    Procedure: ESOPHAGOGASTRODUODENOSCOPY (EGD) for foreign body removal;  Surgeon: Pamela Perez MD;  [facility]     ESOPHAGOSCOPY, GASTROSCOPY, DUODENOSCOPY (EGD), COMBINED N/A 8/27/2020    Procedure: ESOPHAGOGASTRODUODENOSCOPY (EGD) with foreign body removal;  Surgeon: Campbell Rogers MD;  [facility]     EXAM UNDER ANESTHESIA ANUS N/A 1/10/2017    Procedure: EXAM UNDER ANESTHESIA ANUS;  Surgeon: Annmarie Haynes MD;  [facility]     EXAM UNDER ANESTHESIA RECTUM N/A 7/19/2018    Procedure: EXAM UNDER ANESTHESIA RECTUM;  EXAM UNDER ANESTHESIA, REMOVAL OF RECTAL FOREIGN BODY;  Surgeon: Annmarie Haynes MD;  [facility]     HC REMOVE FECAL IMPACTION OR FB W ANESTHESIA N/A 12/18/2016    Procedure: REMOVE FECAL IMPACTION/FOREIGN BODY UNDER ANESTHESIA;  Surgeon: Soham Cano MD;  [facility]     KNEE SURGERY - removed a small tissue mass from knee Right      LAPAROSCOPIC ABLATION ENDOMETRIOSIS       LAPAROTOMY EXPLORATORY N/A 1/10/2017    Procedure: LAPAROTOMY EXPLORATORY;  Surgeon: Annmarie Haynes MD;  [facility]     LAPAROTOMY EXPLORATORY  09/11/2019    Manual manipulation of bowel to remove pill bottle in rectum     lymph nodes removed from neck; benign  age 7     MAMMOPLASTY REDUCTION Bilateral      RELEASE CARPAL TUNNEL Bilateral      SIGMOIDOSCOPY FLEXIBLE N/A 1/10/2017    Procedure: SIGMOIDOSCOPY FLEXIBLE;  Surgeon: Annmarie Haynes MD;  [facility]     SIGMOIDOSCOPY FLEXIBLE N/A 5/8/2018    Procedure: SIGMOIDOSCOPY FLEXIBLE;  flex sig with foreign body removal using snare and rattooth forcep;  Surgeon: Soham Cano MD;  Location:  GI     SIGMOIDOSCOPY FLEXIBLE N/A 2/20/2019    Procedure: Exam under anesthesia Colonoscopy with attempted  removal of rectal foreign  body;  Surgeon: Estrada Chávez MD;  [facility]     acetaminophen (TYLENOL) 32 mg/mL liquid  albuterol (PROAIR HFA/PROVENTIL HFA/VENTOLIN HFA) 108 (90 Base) MCG/ACT inhaler  busPIRone (BUSPAR) 15 MG tablet  Cholecalciferol (VITAMIN D) 50 MCG (2000 UT) CAPS  cloNIDine (CATAPRES) 0.1 MG tablet  desvenlafaxine (PRISTIQ) 100 MG 24 hr tablet  docosanol (ABREVA) 10 % CREA cream  hydroxychloroquine (PLAQUENIL) 200 MG tablet  hydrOXYzine (ATARAX) 50 MG tablet  lidocaine (XYLOCAINE) 2 % solution  lurasidone (LATUDA) 60 MG TABS tablet  metFORMIN (GLUCOPHAGE-XR) 500 MG 24 hr tablet  naltrexone (DEPADE/REVIA) 50 MG tablet  norethindrone (MICRONOR) 0.35 MG tablet  ondansetron (ZOFRAN-ODT) 4 MG ODT tab  pantoprazole (PROTONIX) 40 MG EC tablet  pregabalin (LYRICA) 50 MG capsule  Prenatal Vit-Fe Fumarate-FA (PNV PRENATAL PLUS MULTIVITAMIN) 27-1 MG TABS per tablet  topiramate (TOPAMAX) 100 MG tablet      Allergies   Allergen Reactions     Amoxicillin-Pot Clavulanate Other (See Comments), Swelling and Rash     PN: facial swelling, left side. Also had cortisone injection the same day as she started the Augmentin.  Noted in downtime recovery of chart.    PN: facial swelling, left side. Also had cortisone injection the same day as she started the Augmentin.; HUT Comment: PN: facial swelling, left side. Also had cortisone injection the same day as she started the Augmentin.Noted in downtime recovery of chart.; HUT Reaction: Rash; HUT Reaction: Unknown; HUT Reaction Type: Allergy; HUT Severity: Med; HUT Noted: [id number]     Oseltamivir Hives     med stopped, PN: med stopped  med stopped, PN: med stopped; HUT Comment: med stopped, PN: med stopped; HUT Reaction: Hives; HUT Reaction Type: Allergy; HUT Severity: Med; HUT Noted: [id number]     Penicillins Anaphylaxis     HUT Reaction: Anaphylaxis; HUT Reaction Type: Allergy; HUT Severity: High; HUT Noted: [id number]     Vancomycin Itching, Swelling and Rash     Other reaction(s):  Redness  Flushed face, very itchy; HUT Comment: Flushed face, very itchy; HUT Reaction: Angioedema; HUT Reaction: Redness; HUT Severity: Med; HUT Noted: [id number]    facial     Hydrocodone Nausea and Vomiting and GI Disturbance     vomiting for days, PN: vomiting for days; HUT Comment: vomiting for days; HUT Reaction: Gastrointestinal; HUT Reaction: Nausea And Vomiting; HUT Reaction Type: Intolerance; HUT Severity: Med; HUT Noted: [id number]  vomiting for days       Blood-Group Specific Substance Other (See Comments)     Patient has an anti-Cw and non-specific antibodies. Blood product orders may be delayed. Draw one red top and two purple top tubes for all type/screen/crossmatch orders.  Patient has anti-Cw, anti-K ([place]), Warm auto and nonspecific antibodies. Blood products may be delayed. Draw patient 24 hours prior to transfusion. Draw one red top and two purple top tubes for all type and screen orders.     Cephalosporins Rash     Influenza Vaccines Other (See Comments) and Nausea and Vomiting     HUT Reaction: Nausea And Vomiting; HUT Reaction Type: Intolerance; HUT Severity: Low; HUT Noted: [id number]     Lamotrigine Rash     Possibly associated with Lamictal.   HUT Comment: Possibly associated with Lamictal. ; HUT Reaction: Rash; HUT Reaction Type: Allergy; HUT Severity: Low; HUT Noted: [id number]     Latex Rash     HUT Reaction: Rash; HUT Reaction Type: Allergy; HUT Severity: Low; HUT Noted: [id number]     Family History  Family History   Problem Relation Age of Onset     Diabetes Type 2  Maternal Grandmother      Diabetes Type 2  Paternal Grandmother      Breast Cancer Paternal Grandmother      Cerebrovascular Disease Father 53     Myocardial Infarction No family hx of      Coronary Artery Disease Early Onset No family hx of      Ovarian Cancer No family hx of      Colon Cancer No family hx of      Social History   Social History     Tobacco Use     Smoking status: Never Smoker     Smokeless tobacco: Never Used    Substance Use Topics     Alcohol use: No     Alcohol/week: 0.0 standard drinks     Drug use: No      Past medical history, past surgical history, medications, allergies, family history, and social history were reviewed with the patient. No additional pertinent items.       Review of Systems  A complete review of systems was performed with pertinent positives and negatives noted in the HPI, and all other systems negative.    Physical Exam   BP: (!) 149/90  Pulse: 110  Temp: 98.6  F (37  C)  Resp: 16  SpO2: 100 %  Physical Exam  General: awake, alert, NAD  Head: normal cephalic  HEENT: pupils equal, conjugate gaze in tact  Neck: Supple  CV: regular rate and rhythm without murmur  Lungs: clear to ascultation  Abd: soft, epigastric tenderness, remainder of the abdomen non-tender, no guarding, no peritoneal signs  EXT: lower extremities without swelling or edema  Neuro: awake, answers questions appropriately. No focal deficits noted   Psych: Patient endorses self-harm and attempt to hurt self swallowing paperclip.  She reports she does not feel safe.      ED Course     The patient was seen and examined by Dr. Marco Antonio Gonzalez in Room ED27.      Procedures       XR Chest 2 Views     Status: None    Narrative    Exam: 2 view chest radiograph 9/18/2020.    HISTORY: Foreign body.    COMPARISON: Chest CT 9/5/2020    FINDINGS: Right Port-A-Cath tip at the right atrium. No focal  pulmonary opacity, pleural effusion, or pneumothorax. Linear metallic  density projecting over the upper abdomen. Levocurvature of the mid to  lower thoracic spine.      Impression    IMPRESSION: Linear radiodensity projecting over the upper abdomen, new  since 9/5/2020, compatible with swallowed paper clip. Levocurvature of  lower thoracic spine.    I have personally reviewed the examination and initial interpretation  and I agree with the findings.    LEXII CHAVIRA MD   XR Abdomen 2 Views     Status: None    Narrative    Exam: XR ABDOMEN 2 VW,  9/18/2020 6:48 PM    Indication: forgein body    Comparison: 8/27/2020    Findings:   2 views the abdomen. Paperclip projecting over the stomach. Lung bases  are unremarkable. Nonobstructive bowel gas pattern. No acute osseous  abnormality. No abdominal free air. Levocurvature of the lower  thoracic spine. Mild dextrocurvature of the lumbar spine.      Impression    Impression: Paperclip projecting over the stomach. S-shaped idiopathic  spinal curvature.    I have personally reviewed the examination and initial interpretation  and I agree with the findings.    LEXII CHAVIRA MD   CBC with platelets differential     Status: Abnormal   Result Value Ref Range    WBC 11.0 4.0 - 11.0 10e9/L    RBC Count 4.39 3.8 - 5.2 10e12/L    Hemoglobin 12.2 11.7 - 15.7 g/dL    Hematocrit 38.6 35.0 - 47.0 %    MCV 88 78 - 100 fl    MCH 27.8 26.5 - 33.0 pg    MCHC 31.6 31.5 - 36.5 g/dL    RDW 13.8 10.0 - 15.0 %    Platelet Count 301 150 - 450 10e9/L    Diff Method Automated Method     % Neutrophils 78.1 %    % Lymphocytes 15.2 %    % Monocytes 5.0 %    % Eosinophils 1.1 %    % Basophils 0.3 %    % Immature Granulocytes 0.3 %    Nucleated RBCs 0 0 /100    Absolute Neutrophil 8.6 (H) 1.6 - 8.3 10e9/L    Absolute Lymphocytes 1.7 0.8 - 5.3 10e9/L    Absolute Monocytes 0.6 0.0 - 1.3 10e9/L    Absolute Eosinophils 0.1 0.0 - 0.7 10e9/L    Absolute Basophils 0.0 0.0 - 0.2 10e9/L    Abs Immature Granulocytes 0.0 0 - 0.4 10e9/L    Absolute Nucleated RBC 0.0    Asymptomatic COVID-19 Virus (Coronavirus) by PCR     Status: None    Specimen: Nasopharyngeal   Result Value Ref Range    COVID-19 Virus PCR to [facility] - Source Swab     COVID-19 Virus PCR to [facility] - Result       Test received-See reflex to IDDL test SARS CoV2 (COVID-19) Virus RT-PCR   Basic metabolic panel     Status: None   Result Value Ref Range    Sodium 138 133 - 144 mmol/L    Potassium 3.9 3.4 - 5.3 mmol/L    Chloride 105 94 - 109 mmol/L    Carbon Dioxide 25 20 - 32 mmol/L     Anion Gap 7 3 - 14 mmol/L    Glucose 97 70 - 99 mg/dL    Urea Nitrogen 11 7 - 30 mg/dL    Creatinine 0.55 0.52 - 1.04 mg/dL    GFR Estimate >90 >60 mL/min/[1.73_m2]    GFR Estimate If Black >90 >60 mL/min/[1.73_m2]    Calcium 9.0 8.5 - 10.1 mg/dL   HCG quantitative pregnancy     Status: None   Result Value Ref Range    HCG Quantitative Serum <1 0 - 5 IU/L   Glucose by meter     Status: None   Result Value Ref Range    Glucose 95 70 - 99 mg/dL   SARS-CoV-2 COVID-19 Virus (Coronavirus) RT-PCR Nasopharyngeal     Status: None    Specimen: Nasopharyngeal   Result Value Ref Range    SARS-CoV-2 Virus Specimen Source Swab     SARS-CoV-2 PCR Result NEGATIVE     SARS-CoV-2 PCR Comment       Testing was performed using the Xpert Xpress SARS-CoV-2 Assay on the Cepheid Gene-Xpert   Instrument Systems. Additional information about this Emergency Use Authorization (EUA)   assay can be found via the Lab Guide.       Medications   0.9% sodium chloride BOLUS (0 mLs Intravenous Stopped 9/18/20 1921)     Followed by   sodium chloride 0.9% infusion ( Intravenous Stopped 9/19/20 0026)   acetaminophen (TYLENOL) tablet 1,000 mg (1,000 mg Oral Given 9/18/20 1837)   ketorolac (TORADOL) injection 15 mg (15 mg Intravenous Given 9/19/20 0028)   OLANZapine zydis (zyPREXA) ODT tab 10 mg (10 mg Oral Given 9/19/20 0232)        Assessments & Plan (with Medical Decision Making)   Nevin is a 21-year-old female who presents after an ingestion of a straightened out paperclip.  Patient has a significant past medical/psychiatric history with recurrent ER visits for swallowing foreign bodies.  Here she is vitally stable, has been on surgical abdomen.    Will obtain labs in anticipation of endoscopic procedure including CBC, electrolytes, and asymptomatic covid swab.  We will also obtain imaging to confirm objects and rule out perforation.    Abdominal x-ray shows paperclip projecting over the stomach.  GI is aware of patient, patient has been n.p.o. for  anticipation of endoscopic retrieval.  Mental health assessment is pending plan will be to admit patient to the psychiatric hospital after mental health evaluation, patient currently voluntary and in agreement with this plan.    I have reviewed the nursing notes. I have reviewed the findings, diagnosis, plan and need for follow up with the patient.    New Prescriptions    No medications on file       Final diagnoses:   Psychosis, unspecified psychosis type (H)     I, Shekhar Christie, am serving as a trained medical scribe to document services personally performed by Marco Antonio Hinojosa MD, based on the provider's statements to me.     I, Marco Antonio Hinojosa MD, was physically present and have reviewed and verified the accuracy of this note documented by Shekhar Christie.    --  Marco Antonio Hinojosa  [facility], [place], EMERGENCY DEPARTMENT  9/18/2020     Marco Antonio Hinojosa MD  09/19/20 0814

## 2020-09-18 NOTE — ED TRIAGE NOTES
Pt presents ambulatory to triage with c/o self harm. Swallowed paperclip about 10 minutes ago.  knows about patients presentation to ED. Was taken off one of her medications yesterday and feels this is why she is feeling this way.

## 2020-09-19 PROBLEM — F32.A DEPRESSION: Status: ACTIVE | Noted: 2020-09-19

## 2020-09-19 LAB
AMPHETAMINES UR QL SCN: NEGATIVE
BARBITURATES UR QL: NEGATIVE
BENZODIAZ UR QL: POSITIVE
CANNABINOIDS UR QL SCN: NEGATIVE
COCAINE UR QL: NEGATIVE
ETHANOL UR QL SCN: NEGATIVE
OPIATES UR QL SCN: NEGATIVE

## 2020-09-19 PROCEDURE — 25000132 ZZH RX MED GY IP 250 OP 250 PS 637: Performed by: STUDENT IN AN ORGANIZED HEALTH CARE EDUCATION/TRAINING PROGRAM

## 2020-09-19 PROCEDURE — 80320 DRUG SCREEN QUANTALCOHOLS: CPT | Performed by: EMERGENCY MEDICINE

## 2020-09-19 PROCEDURE — 80307 DRUG TEST PRSMV CHEM ANLYZR: CPT | Performed by: EMERGENCY MEDICINE

## 2020-09-19 PROCEDURE — 25000128 H RX IP 250 OP 636: Performed by: EMERGENCY MEDICINE

## 2020-09-19 PROCEDURE — 25000132 ZZH RX MED GY IP 250 OP 250 PS 637: Performed by: EMERGENCY MEDICINE

## 2020-09-19 PROCEDURE — 12400001 ZZH R&B MH UMMC

## 2020-09-19 RX ORDER — BUSPIRONE HYDROCHLORIDE 15 MG/1
15 TABLET ORAL ONCE
Status: DISCONTINUED | OUTPATIENT
Start: 2020-09-19 | End: 2020-09-19

## 2020-09-19 RX ORDER — ALUMINA, MAGNESIA, AND SIMETHICONE 2400; 2400; 240 MG/30ML; MG/30ML; MG/30ML
30 SUSPENSION ORAL EVERY 4 HOURS PRN
Status: DISCONTINUED | OUTPATIENT
Start: 2020-09-19 | End: 2020-09-20 | Stop reason: HOSPADM

## 2020-09-19 RX ORDER — OLANZAPINE 5 MG/1
10 TABLET, ORALLY DISINTEGRATING ORAL ONCE
Status: COMPLETED | OUTPATIENT
Start: 2020-09-19 | End: 2020-09-19

## 2020-09-19 RX ORDER — LORAZEPAM 0.5 MG/1
0.5 TABLET ORAL ONCE
Status: COMPLETED | OUTPATIENT
Start: 2020-09-19 | End: 2020-09-19

## 2020-09-19 RX ORDER — DESVENLAFAXINE 50 MG/1
100 TABLET, FILM COATED, EXTENDED RELEASE ORAL DAILY
Status: DISCONTINUED | OUTPATIENT
Start: 2020-09-19 | End: 2020-09-19

## 2020-09-19 RX ORDER — ACETAMINOPHEN 325 MG/1
650 TABLET ORAL EVERY 4 HOURS PRN
Status: DISCONTINUED | OUTPATIENT
Start: 2020-09-19 | End: 2020-09-20 | Stop reason: HOSPADM

## 2020-09-19 RX ORDER — PREGABALIN 50 MG/1
50 CAPSULE ORAL ONCE
Status: COMPLETED | OUTPATIENT
Start: 2020-09-19 | End: 2020-09-19

## 2020-09-19 RX ORDER — PREGABALIN 50 MG/1
50 CAPSULE ORAL 3 TIMES DAILY
Status: DISCONTINUED | OUTPATIENT
Start: 2020-09-19 | End: 2020-09-20 | Stop reason: HOSPADM

## 2020-09-19 RX ORDER — DESVENLAFAXINE 50 MG/1
100 TABLET, FILM COATED, EXTENDED RELEASE ORAL EVERY MORNING
Status: DISCONTINUED | OUTPATIENT
Start: 2020-09-20 | End: 2020-09-20 | Stop reason: HOSPADM

## 2020-09-19 RX ORDER — ACETAMINOPHEN AND CODEINE PHOSPHATE 120; 12 MG/5ML; MG/5ML
0.35 SOLUTION ORAL DAILY
Status: DISCONTINUED | OUTPATIENT
Start: 2020-09-20 | End: 2020-09-20 | Stop reason: HOSPADM

## 2020-09-19 RX ORDER — PANTOPRAZOLE SODIUM 40 MG/1
40 TABLET, DELAYED RELEASE ORAL DAILY
Status: DISCONTINUED | OUTPATIENT
Start: 2020-09-20 | End: 2020-09-20 | Stop reason: HOSPADM

## 2020-09-19 RX ORDER — HEPARIN SODIUM (PORCINE) LOCK FLUSH IV SOLN 100 UNIT/ML 100 UNIT/ML
5 SOLUTION INTRAVENOUS
Status: DISCONTINUED | OUTPATIENT
Start: 2020-09-19 | End: 2020-09-20 | Stop reason: HOSPADM

## 2020-09-19 RX ORDER — VITAMIN B COMPLEX
2000 TABLET ORAL DAILY
Status: DISCONTINUED | OUTPATIENT
Start: 2020-09-20 | End: 2020-09-20 | Stop reason: HOSPADM

## 2020-09-19 RX ORDER — LURASIDONE HYDROCHLORIDE 20 MG/1
60 TABLET, FILM COATED ORAL EVERY EVENING
Status: DISCONTINUED | OUTPATIENT
Start: 2020-09-19 | End: 2020-09-20 | Stop reason: HOSPADM

## 2020-09-19 RX ORDER — METFORMIN HCL 500 MG
1000 TABLET, EXTENDED RELEASE 24 HR ORAL DAILY
Status: DISCONTINUED | OUTPATIENT
Start: 2020-09-19 | End: 2020-09-20 | Stop reason: HOSPADM

## 2020-09-19 RX ORDER — TOPIRAMATE 50 MG/1
50 TABLET, FILM COATED ORAL DAILY
Status: DISCONTINUED | OUTPATIENT
Start: 2020-09-20 | End: 2020-09-20 | Stop reason: HOSPADM

## 2020-09-19 RX ORDER — NALTREXONE HYDROCHLORIDE 50 MG/1
50 TABLET, FILM COATED ORAL EVERY EVENING
Status: DISCONTINUED | OUTPATIENT
Start: 2020-09-19 | End: 2020-09-20 | Stop reason: HOSPADM

## 2020-09-19 RX ORDER — ALBUTEROL SULFATE 90 UG/1
2 AEROSOL, METERED RESPIRATORY (INHALATION)
Status: DISCONTINUED | OUTPATIENT
Start: 2020-09-19 | End: 2020-09-20 | Stop reason: HOSPADM

## 2020-09-19 RX ORDER — CLONIDINE HYDROCHLORIDE 0.1 MG/1
0.1 TABLET ORAL 2 TIMES DAILY
Status: DISCONTINUED | OUTPATIENT
Start: 2020-09-19 | End: 2020-09-20 | Stop reason: HOSPADM

## 2020-09-19 RX ORDER — HYDROXYCHLOROQUINE SULFATE 200 MG/1
200 TABLET, FILM COATED ORAL DAILY
Status: DISCONTINUED | OUTPATIENT
Start: 2020-09-19 | End: 2020-09-19

## 2020-09-19 RX ORDER — OLANZAPINE 10 MG/2ML
10 INJECTION, POWDER, FOR SOLUTION INTRAMUSCULAR
Status: DISCONTINUED | OUTPATIENT
Start: 2020-09-19 | End: 2020-09-20 | Stop reason: HOSPADM

## 2020-09-19 RX ORDER — OLANZAPINE 10 MG/1
10 TABLET ORAL
Status: DISCONTINUED | OUTPATIENT
Start: 2020-09-19 | End: 2020-09-20 | Stop reason: HOSPADM

## 2020-09-19 RX ORDER — PRENATAL VIT/IRON FUM/FOLIC AC 27MG-0.8MG
1 TABLET ORAL DAILY
Status: DISCONTINUED | OUTPATIENT
Start: 2020-09-20 | End: 2020-09-20 | Stop reason: HOSPADM

## 2020-09-19 RX ORDER — BUSPIRONE HYDROCHLORIDE 15 MG/1
15 TABLET ORAL 2 TIMES DAILY
Status: DISCONTINUED | OUTPATIENT
Start: 2020-09-19 | End: 2020-09-20 | Stop reason: HOSPADM

## 2020-09-19 RX ORDER — HYDROXYZINE HYDROCHLORIDE 50 MG/1
50 TABLET, FILM COATED ORAL 3 TIMES DAILY PRN
Status: DISCONTINUED | OUTPATIENT
Start: 2020-09-19 | End: 2020-09-20 | Stop reason: HOSPADM

## 2020-09-19 RX ORDER — ONDANSETRON 4 MG/1
4 TABLET, ORALLY DISINTEGRATING ORAL EVERY 6 HOURS PRN
Status: DISCONTINUED | OUTPATIENT
Start: 2020-09-19 | End: 2020-09-20 | Stop reason: HOSPADM

## 2020-09-19 RX ORDER — HYDROXYCHLOROQUINE SULFATE 200 MG/1
200 TABLET, FILM COATED ORAL 2 TIMES DAILY
Status: DISCONTINUED | OUTPATIENT
Start: 2020-09-19 | End: 2020-09-20 | Stop reason: HOSPADM

## 2020-09-19 RX ADMIN — HYDROXYCHLOROQUINE SULFATE 200 MG: 200 TABLET, FILM COATED ORAL at 21:15

## 2020-09-19 RX ADMIN — LURASIDONE HYDROCHLORIDE 60 MG: 20 TABLET, FILM COATED ORAL at 21:15

## 2020-09-19 RX ADMIN — METFORMIN HYDROCHLORIDE 1000 MG: 500 TABLET, EXTENDED RELEASE ORAL at 21:15

## 2020-09-19 RX ADMIN — CLONIDINE HYDROCHLORIDE 0.1 MG: 0.1 TABLET ORAL at 21:14

## 2020-09-19 RX ADMIN — OLANZAPINE 10 MG: 5 TABLET, ORALLY DISINTEGRATING ORAL at 02:32

## 2020-09-19 RX ADMIN — KETOROLAC TROMETHAMINE 15 MG: 15 INJECTION, SOLUTION INTRAMUSCULAR; INTRAVENOUS at 00:28

## 2020-09-19 RX ADMIN — PREGABALIN 50 MG: 50 CAPSULE ORAL at 14:22

## 2020-09-19 RX ADMIN — BUSPIRONE HYDROCHLORIDE 15 MG: 15 TABLET ORAL at 21:14

## 2020-09-19 RX ADMIN — Medication 5 ML: at 17:22

## 2020-09-19 RX ADMIN — LORAZEPAM 0.5 MG: 0.5 TABLET ORAL at 21:14

## 2020-09-19 ASSESSMENT — ACTIVITIES OF DAILY LIVING (ADL)
RETIRED_COMMUNICATION: 0-->UNDERSTANDS/COMMUNICATES WITHOUT DIFFICULTY
DRESS: 0-->INDEPENDENT
FALL_HISTORY_WITHIN_LAST_SIX_MONTHS: NO
TOILETING: 0-->INDEPENDENT
RETIRED_EATING: 0-->INDEPENDENT
BATHING: 0-->INDEPENDENT
COGNITION: 0 - NO COGNITION ISSUES REPORTED
TRANSFERRING: 0-->INDEPENDENT
AMBULATION: 0-->INDEPENDENT
SWALLOWING: 0-->SWALLOWS FOODS/LIQUIDS WITHOUT DIFFICULTY

## 2020-09-19 NOTE — ED PROVIDER NOTES
Talked to Chuck w/ DEC, agrees patient should be admitted. Will kevin intake once patient has foreign body removed.     Josue Cuevas MD  09/18/20 2009

## 2020-09-19 NOTE — TELEPHONE ENCOUNTER
[facility]/[place]    - unit [facility] will not have available staff to take pt all weekend per charge rn and nurse supervisor. Intake identifying other options.   - pt does have previous admissions at [place] on station 20[facility]  - paged resident 815am  - resident reviewing pt 830am

## 2020-09-19 NOTE — ED PROVIDER NOTES
Patient accepted to psych.  Will transfer to [facility] for psychiatric admission.    Patient needs to be institutionalized.  Dozens of intentional foreign body ingestions, likely suicide attempts, patient is at profound risk for legitimate self-harm.  This could be from bowel perforation, complications from anesthesia, and from foreign body ingestion, etc.    I am unsure how this is going on for this long without being institutionalized, thankfully at this time we are able to get patient admitted so this can be initiated.  It is not safe to send the patient home under any circumstance.  She is coming in voluntarily, if she wants to leave, I will place her on a 72-hour hold.     Josue Cuevas MD  09/18/20 6117

## 2020-09-19 NOTE — ED NOTES
[facility] [place]   ED Nurse to Floor Handoff     Nevin Alvarado is a 29 year old female who speaks English and lives with others,  in a group home  They arrived in the ED by car from home.  Swallowed a paper clip in the lobby   yest dsaw psyhch hydroxonic 25 mg stopped it   Thoughts of self harm before coming into lobby   Was it our paper clip?   Thoughts of self harm couldn't get here in time   Feels nasueous and some abdominal discomfort   Requesting admission to psych for med management   Paper clip - unrolled straightened it out   Last time she ate   Abdominal tenderness or distention     Last ate at 1 hour ago - salad with strawberries   Abdominal pain was worsened - requesting tylenol     ED Chief Complaint: Ingestion (swallowed paperclip 10 minutes ago)    ED Dx;   Final diagnoses:   Psychosis, unspecified psychosis type (H)         Needed?: No    Allergies:   Allergies   Allergen Reactions     Amoxicillin-Pot Clavulanate Other (See Comments), Swelling and Rash     PN: facial swelling, left side. Also had cortisone injection the same day as she started the Augmentin.  Noted in downtime recovery of chart.    PN: facial swelling, left side. Also had cortisone injection the same day as she started the Augmentin.; HUT Comment: PN: facial swelling, left side. Also had cortisone injection the same day as she started the Augmentin.Noted in downtime recovery of chart.; HUT Reaction: Rash; HUT Reaction: Unknown; HUT Reaction Type: Allergy; HUT Severity: Med; HUT Noted: [id number]     Oseltamivir Hives     med stopped, PN: med stopped  med stopped, PN: med stopped; HUT Comment: med stopped, PN: med stopped; HUT Reaction: Hives; HUT Reaction Type: Allergy; HUT Severity: Med; HUT Noted: [id number]     Penicillins Anaphylaxis     HUT Reaction: Anaphylaxis; HUT Reaction Type: Allergy; HUT Severity: High; HUT Noted: [id number]     Vancomycin Itching, Swelling and Rash     Other  reaction(s): Redness  Flushed face, very itchy; HUT Comment: Flushed face, very itchy; HUT Reaction: Angioedema; HUT Reaction: Redness; HUT Severity: Med; HUT Noted: [id number]    facial     Hydrocodone Nausea and Vomiting and GI Disturbance     vomiting for days, PN: vomiting for days; HUT Comment: vomiting for days; HUT Reaction: Gastrointestinal; HUT Reaction: Nausea And Vomiting; HUT Reaction Type: Intolerance; HUT Severity: Med; HUT Noted: [id number]  vomiting for days       Blood-Group Specific Substance Other (See Comments)     Patient has an anti-Cw and non-specific antibodies. Blood product orders may be delayed. Draw one red top and two purple top tubes for all type/screen/crossmatch orders.  Patient has anti-Cw, anti-K ([place]), Warm auto and nonspecific antibodies. Blood products may be delayed. Draw patient 24 hours prior to transfusion. Draw one red top and two purple top tubes for all type and screen orders.     Cephalosporins Rash     Influenza Vaccines Other (See Comments) and Nausea and Vomiting     HUT Reaction: Nausea And Vomiting; HUT Reaction Type: Intolerance; HUT Severity: Low; HUT Noted: [id number]     Lamotrigine Rash     Possibly associated with Lamictal.   HUT Comment: Possibly associated with Lamictal. ; HUT Reaction: Rash; HUT Reaction Type: Allergy; HUT Severity: Low; HUT Noted: [id number]     Latex Rash     HUT Reaction: Rash; HUT Reaction Type: Allergy; HUT Severity: Low; HUT Noted: [id number]   .  Past Medical Hx:   Past Medical History:   Diagnosis Date     ADD (attention deficit disorder)      Anorexia nervosa with bulimia     history of; on Topamax     Anxiety      Borderline personality disorder (H)      Depression      H/O self-harm      History of pulmonary embolism 12/2019    Provoked. Completed 3 month course of Apixaban     Morbid obesity (H)      Neuropathy      PTSD (post-traumatic stress disorder)      Rectal foreign body - Recurrent issue, self placed      Swallowed foreign body -  Recurrent issue, self placed       Baseline Mental status: WDL  Current Mental Status changes: at basesline    Infection present or suspected this encounter: no  Sepsis suspected: No  Isolation type: No active isolations     Activity level - Baseline/Home:  Independent  Activity Level - Current:   Stand with Assist    Bariatric equipment needed?: No    In the ED these meds were given:   Medications   0.9% sodium chloride BOLUS (0 mLs Intravenous Stopped 9/18/20 1921)     Followed by   sodium chloride 0.9% infusion ( Intravenous Stopped 9/19/20 0026)   acetaminophen (TYLENOL) tablet 1,000 mg (1,000 mg Oral Given 9/18/20 1837)   ketorolac (TORADOL) injection 15 mg (15 mg Intravenous Given 9/19/20 0028)   OLANZapine zydis (zyPREXA) ODT tab 10 mg (10 mg Oral Given 9/19/20 0232)       Drips running?  No    Home pump  No    Current LDAs  Port A Cath Single 09/08/20 Right Chest wall (Active)   Access Date 09/18/20 09/18/20 1700   Access Attempts 1 09/18/20 1700   Gauge 3/4 inch;20 gauge 09/18/20 1700   Site Assessment WDL 09/18/20 2200   Line Status Infusing 09/18/20 2200   Extravasation? No 09/18/20 2200   Dressing Intervention Transparent 09/18/20 2200   Number of days: 11       Labs results:   Labs Ordered and Resulted from Time of ED Arrival Up to the Time of Departure from the ED   CBC WITH PLATELETS DIFFERENTIAL - Abnormal; Notable for the following components:       Result Value    Absolute Neutrophil 8.6 (*)     All other components within normal limits   BASIC METABOLIC PANEL   HCG QUANTITATIVE PREGNANCY   DRUG ABUSE SCREEN 6 CHEM DEP URINE ([facility])       Imaging Studies:   Recent Results (from the past 24 hour(s))   XR Chest 2 Views    Narrative    Exam: 2 view chest radiograph 9/18/2020.    HISTORY: Foreign body.    COMPARISON: Chest CT 9/5/2020    FINDINGS: Right Port-A-Cath tip at the right atrium. No focal  pulmonary opacity, pleural effusion, or pneumothorax. Linear metallic  density projecting over the upper  abdomen. Levocurvature of the mid to  lower thoracic spine.      Impression    IMPRESSION: Linear radiodensity projecting over the upper abdomen, new  since 9/5/2020, compatible with swallowed paper clip. Levocurvature of  lower thoracic spine.    I have personally reviewed the examination and initial interpretation  and I agree with the findings.    LEXII CHAVIRA MD   XR Abdomen 2 Views    Narrative    Exam: XR ABDOMEN 2 VW, 9/18/2020 6:48 PM    Indication: forgein body    Comparison: 8/27/2020    Findings:   2 views the abdomen. Paperclip projecting over the stomach. Lung bases  are unremarkable. Nonobstructive bowel gas pattern. No acute osseous  abnormality. No abdominal free air. Levocurvature of the lower  thoracic spine. Mild dextrocurvature of the lumbar spine.      Impression    Impression: Paperclip projecting over the stomach. S-shaped idiopathic  spinal curvature.    I have personally reviewed the examination and initial interpretation  and I agree with the findings.    LEXII CHAVIRA MD       Recent vital signs:   BP (!) 135/93   Pulse 99   Temp 98.2  F (36.8  C) (Oral)   Resp 16   SpO2 97%   Breastfeeding No     Eliazar Coma Scale Score: 15 (09/18/20 2200)       Cardiac Rhythm: Other  Pt needs tele? No  Skin/wound Issues: None    Code Status: Full Code    Pain control: fair    Nausea control: good    Abnormal labs/tests/findings requiring intervention: see epic    Family present during ED course? No   Family Comments/Social Situation comments: NA    Tasks needing completion: None    Keturah Lucero, RN    [phone] [facility]

## 2020-09-19 NOTE — ED NOTES
SIGN-OUT:  - Assumed care of this patient from Dr. Young, she is a hold over from yesterday evening shift.  In brief she came in after the intentional ingestion of a paperclip, this was removed by GI via endoscopy.  She had a mental health assessment and plan is for patient to go to psychiatric inpatient with the hopes to pursue commitment.  - Pending at shift change: Awaiting transfer to inpatient mental health  - Tentative plan from original EM Physician: Nothing to do    PE:   VS: normal   General: Patient is awake alert conversant  CV: Regular rate  Lungs: Breathing comfortably on room air  Neuro: Awake, alert, conversant.  No focal deficits noted on exam.    UPDATES / REASSESSMENT:  -Morning medications were given  -Per GI note patient was started on a full liquid diet  -No acute interventions necessary while in the ER  -Patient signed out to Dr. Jenkins.         MD Micaela Bailey Evan Martin, MD  09/20/20 2747

## 2020-09-19 NOTE — ANESTHESIA PREPROCEDURE EVALUATION
Anesthesia Pre-Procedure Evaluation    Patient: Nevin Alvarado   MRN:     [id number] Gender:   female   Age:    29 year old :      1990        Preoperative Diagnosis: Swallowed foreign body, initial encounter [T18.9XXA]   Procedure(s):  ESOPHAGOGASTRODUODENOSCOPY (EGD)     HPI:  Swallowed a straightened paperclip.    LABS:  CBC:   Lab Results   Component Value Date    WBC 11.0 2020    WBC 8.0 2020    HGB 12.2 2020    HGB 11.4 (L) 2020    HCT 38.6 2020    HCT 36.0 2020     2020     2020     BMP:   Lab Results   Component Value Date     2020     2020    POTASSIUM 3.9 2020    POTASSIUM 3.6 2020    CHLORIDE 105 2020    CHLORIDE 107 2020    CO2 25 2020    CO2 27 2020    BUN 11 2020    BUN 10 2020    CR 0.55 2020    CR 0.56 2020    GLC 97 2020     (H) 2020     COAGS:   Lab Results   Component Value Date    PTT 34 2020    INR 1.04 2020     POC:   Lab Results   Component Value Date    BGM 95 2020    HCG Negative 2020    HCGS Negative 2020     OTHER:   Lab Results   Component Value Date    LACT 1.0 2019    KELBY 9.0 2020    PHOS 3.5 2019    MAG 1.9 2019    ALBUMIN 3.1 (L) 2020    PROTTOTAL 8.4 2020    ALT 33 2020    AST 23 2020    ALKPHOS 77 2020    BILITOTAL 0.2 2020    LIPASE 97 2020    TSH 6.96 (H) 2020    T4 0.94 2020    CRP 22.0 (H) 2020    SED 26 (H) 2017        Preop Vitals    BP Readings from Last 3 Encounters:   20 (!) 145/91   20 (!) 149/101   20 (!) 138/98    Pulse Readings from Last 3 Encounters:   20 106   20 109   20 99      Resp Readings from Last 3 Encounters:   20 14   20 16   20 16    SpO2 Readings from Last 3 Encounters:   20 97%   20 98%  "  09/09/20 97%      Temp Readings from Last 1 Encounters:   09/18/20 36.8  C (98.2  F) (Oral)    Ht Readings from Last 1 Encounters:   09/09/20 1.575 m (5' 2\")      Wt Readings from Last 1 Encounters:   09/08/20 121.1 kg (267 lb)    Estimated body mass index is 48.83 kg/m  as calculated from the following:    Height as of 9/9/20: 1.575 m (5' 2\").    Weight as of 9/8/20: 121.1 kg (267 lb).     LDA:  Port A Cath Single 09/08/20 Right Chest wall (Active)   Access Date 09/18/20 09/18/20 1700   Access Attempts 1 09/18/20 1700   Gauge 3/4 inch;20 gauge 09/18/20 1700   Site Assessment WDL 09/18/20 2200   Line Status Infusing 09/18/20 2200   Extravasation? No 09/18/20 2200   Dressing Intervention Transparent 09/18/20 2200   Number of days: 10        Past Medical History:   Diagnosis Date     ADD (attention deficit disorder)      Anorexia nervosa with bulimia     history of; on Topamax     Anxiety      Borderline personality disorder (H)      Depression      H/O self-harm      History of pulmonary embolism 12/2019    Provoked. Completed 3 month course of Apixaban     Morbid obesity (H)      Neuropathy      PTSD (post-traumatic stress disorder)      Rectal foreign body - Recurrent issue, self placed      Swallowed foreign body - Recurrent issue, self placed       Past Surgical History:   Procedure Laterality Date     COMBINED ESOPHAGOSCOPY, GASTROSCOPY, DUODENOSCOPY (EGD), REPLACE ESOPHAGEAL STENT N/A 10/9/2019    Procedure: Upper Endoscopy with Suture Placement;  Surgeon: Zurdo Ramirez MD;  [facility]     ESOPHAGOSCOPY, GASTROSCOPY, DUODENOSCOPY (EGD), COMBINED N/A 3/9/2017    Procedure: COMBINED ESOPHAGOSCOPY, GASTROSCOPY, DUODENOSCOPY (EGD), REMOVE FOREIGN BODY;  Surgeon: Avis Guzmán MD;  [facility]     ESOPHAGOSCOPY, GASTROSCOPY, DUODENOSCOPY (EGD), COMBINED N/A 4/20/2017    Procedure: COMBINED ESOPHAGOSCOPY, GASTROSCOPY, DUODENOSCOPY (EGD), REMOVE FOREIGN BODY;  EGD removal Foregin body; "  Surgeon: Lokesh Paula MD;  [facility]     ESOPHAGOSCOPY, GASTROSCOPY, DUODENOSCOPY (EGD), COMBINED N/A 6/12/2017    Procedure: COMBINED ESOPHAGOSCOPY, GASTROSCOPY, DUODENOSCOPY (EGD);  COMBINED ESOPHAGOSCOPY, GASTROSCOPY, DUODENOSCOPY (EGD) [[id number]]attempted removal of foreign body;  Surgeon: Pamela Perez MD;  [facility]     ESOPHAGOSCOPY, GASTROSCOPY, DUODENOSCOPY (EGD), COMBINED N/A 6/9/2017    Procedure: COMBINED ESOPHAGOSCOPY, GASTROSCOPY, DUODENOSCOPY (EGD), REMOVE FOREIGN BODY;  Esophagoscopy, Gastroscopy, Duodenoscopy, Removal of Foreign Body;  Surgeon: Dejon Marsh MD;  [facility]     ESOPHAGOSCOPY, GASTROSCOPY, DUODENOSCOPY (EGD), COMBINED N/A 1/6/2018    Procedure: COMBINED ESOPHAGOSCOPY, GASTROSCOPY, DUODENOSCOPY (EGD), REMOVE FOREIGN BODY;  COMBINED ESOPHAGOSCOPY, GASTROSCOPY, DUODENOSCOPY (EGD) [by pascal net and snare with profol sedation;  Surgeon: Feliciano Emmanuel MD;  Location:  GI     ESOPHAGOSCOPY, GASTROSCOPY, DUODENOSCOPY (EGD), COMBINED N/A 3/19/2018    Procedure: COMBINED ESOPHAGOSCOPY, GASTROSCOPY, DUODENOSCOPY (EGD), REMOVE FOREIGN BODY;   Esophagodscopy, Gastroscopy, Duodenoscopy,Foreign Body Removal;  Surgeon: Brice Guzmán MD;  [facility]     ESOPHAGOSCOPY, GASTROSCOPY, DUODENOSCOPY (EGD), COMBINED N/A 4/16/2018    Procedure: COMBINED ESOPHAGOSCOPY, GASTROSCOPY, DUODENOSCOPY (EGD), REMOVE FOREIGN BODY;  Esophagogastroduodenoscopy  Foreign Body Removal X 2;  Surgeon: Royer Moise MD;  [facility]     ESOPHAGOSCOPY, GASTROSCOPY, DUODENOSCOPY (EGD), COMBINED N/A 6/1/2018    Procedure: COMBINED ESOPHAGOSCOPY, GASTROSCOPY, DUODENOSCOPY (EGD), REMOVE FOREIGN BODY;  COMBINED ESOPHAGOSCOPY, GASTROSCOPY, DUODENOSCOPY with removal of foreign body, propofol sedation by anesthesia;  Surgeon: Brice Martinez MD;  Location:  GI     ESOPHAGOSCOPY, GASTROSCOPY, DUODENOSCOPY (EGD), COMBINED N/A 7/25/2018     Procedure: COMBINED ESOPHAGOSCOPY, GASTROSCOPY, DUODENOSCOPY (EGD), REMOVE FOREIGN BODY;;  Surgeon: Candy Castelan MD;  Location:  GI     ESOPHAGOSCOPY, GASTROSCOPY, DUODENOSCOPY (EGD), COMBINED N/A 7/28/2018    Procedure: COMBINED ESOPHAGOSCOPY, GASTROSCOPY, DUODENOSCOPY (EGD), REMOVE FOREIGN BODY;  COMBINED ESOPHAGOSCOPY, GASTROSCOPY, DUODENOSCOPY (EGD), REMOVE FOREIGN BODY;  Surgeon: Brice Guzmán MD;  [facility]     ESOPHAGOSCOPY, GASTROSCOPY, DUODENOSCOPY (EGD), COMBINED N/A 7/31/2018    Procedure: COMBINED ESOPHAGOSCOPY, GASTROSCOPY, DUODENOSCOPY (EGD);  COMBINED ESOPHAGOSCOPY, GASTROSCOPY, DUODENOSCOPY (EGD) TO REMOVE FOREIGN BODY;  Surgeon: Lokesh Paula MD;  [facility]     ESOPHAGOSCOPY, GASTROSCOPY, DUODENOSCOPY (EGD), COMBINED N/A 8/4/2018    Procedure: COMBINED ESOPHAGOSCOPY, GASTROSCOPY, DUODENOSCOPY (EGD), REMOVE FOREIGN BODY;   combined esophagoscopy, gastroscopy, duodenoscopy, REMOVE FOREIGN BODY ;  Surgeon: Lokesh Paula MD;  [facility]     ESOPHAGOSCOPY, GASTROSCOPY, DUODENOSCOPY (EGD), COMBINED N/A 10/6/2019    Procedure: ESOPHAGOGASTRODUODENOSCOPY (EGD) with fireign body removal x2, esophageal stent placement with floroscopy;  Surgeon: Timoteo Espana MD;  [facility]     ESOPHAGOSCOPY, GASTROSCOPY, DUODENOSCOPY (EGD), COMBINED N/A 12/2/2019    Procedure: Esophagogastroduodenoscopy with esophageal stent removal, esophogram;  Surgeon: Kailee Tena MD;  [facility]     ESOPHAGOSCOPY, GASTROSCOPY, DUODENOSCOPY (EGD), COMBINED N/A 12/17/2019    Procedure: ESOPHAGOGASTRODUODENOSCOPY, WITH FOREIGN BODY REMOVAL;  Surgeon: Pamela Perez MD;  [facility]     ESOPHAGOSCOPY, GASTROSCOPY, DUODENOSCOPY (EGD), COMBINED N/A 12/13/2019    Procedure: ESOPHAGOGASTRODUODENOSCOPY, WITH FOREIGN BODY REMOVAL;  Surgeon: Samia Stanton MD;  [facility]     ESOPHAGOSCOPY, GASTROSCOPY, DUODENOSCOPY (EGD), COMBINED N/A 12/28/2019     Procedure: ESOPHAGOGASTRODUODENOSCOPY (EGD) Removal of Foreign Body X 2;  Surgeon: Huy Kelley MD;  [facility]     ESOPHAGOSCOPY, GASTROSCOPY, DUODENOSCOPY (EGD), COMBINED N/A 1/5/2020    Procedure: ESOPHAGOGASTRODUOENOSCOPY WITH FOREIGN BODY REMOVAL;  Surgeon: Pamela Perez MD;  [facility]     ESOPHAGOSCOPY, GASTROSCOPY, DUODENOSCOPY (EGD), COMBINED N/A 1/3/2020    Procedure: ESOPHAGOGASTRODUODENOSCOPY (EGD) REMOVAL OF FOREIGN BODY.;  Surgeon: Pamela Perez MD;  [facility]     ESOPHAGOSCOPY, GASTROSCOPY, DUODENOSCOPY (EGD), COMBINED N/A 1/13/2020    Procedure: ESOPHAGOGASTRODUODENOSCOPY (EGD) for foreign body removal;  Surgeon: Lokesh Paula MD;  [facility]     ESOPHAGOSCOPY, GASTROSCOPY, DUODENOSCOPY (EGD), COMBINED N/A 1/18/2020    Procedure: Diagnostic ESOPHAGOGASTRODUODENOSCOPY (EGD;  Surgeon: Lokesh Paula MD;  [facility]     ESOPHAGOSCOPY, GASTROSCOPY, DUODENOSCOPY (EGD), COMBINED N/A 3/29/2020    Procedure: UPPER ENDOSCOPY WITH FOREIGN BODY REMOVAL;  Surgeon: Doug Hansen MD;  [facility]     ESOPHAGOSCOPY, GASTROSCOPY, DUODENOSCOPY (EGD), COMBINED N/A 7/11/2020    Procedure: ESOPHAGOGASTRODUODENOSCOPY (EGD); Upper Endoscopy WITH FOREIGN BODY REMOVAL;  Surgeon: Lyndsey Mendoza DO;  [facility]     ESOPHAGOSCOPY, GASTROSCOPY, DUODENOSCOPY (EGD), COMBINED N/A 7/29/2020    Procedure: ESOPHAGOGASTRODUODENOSCOPY REMOVAL OF FOREIGN BODY;  Surgeon: Samia Stanton MD;  [facility]     ESOPHAGOSCOPY, GASTROSCOPY, DUODENOSCOPY (EGD), COMBINED N/A 8/1/2020    Procedure: ESOPHAGOGASTRODUODENOSCOPY, WITH FOREIGN BODY REMOVAL;  Surgeon: Pamela Perez MD;  [facility]     ESOPHAGOSCOPY, GASTROSCOPY, DUODENOSCOPY (EGD), COMBINED N/A 8/18/2020    Procedure: ESOPHAGOGASTRODUODENOSCOPY (EGD) for foreign body removal;  Surgeon: Pamela Perez MD;  [facility]     ESOPHAGOSCOPY,  GASTROSCOPY, DUODENOSCOPY (EGD), COMBINED N/A 8/27/2020    Procedure: ESOPHAGOGASTRODUODENOSCOPY (EGD) with foreign body removal;  Surgeon: Campbell Rogers MD;  [facility]     EXAM UNDER ANESTHESIA ANUS N/A 1/10/2017    Procedure: EXAM UNDER ANESTHESIA ANUS;  Surgeon: Annmarie Haynse MD;  [facility]     EXAM UNDER ANESTHESIA RECTUM N/A 7/19/2018    Procedure: EXAM UNDER ANESTHESIA RECTUM;  EXAM UNDER ANESTHESIA, REMOVAL OF RECTAL FOREIGN BODY;  Surgeon: Annmarie Haynes MD;  [facility]     HC REMOVE FECAL IMPACTION OR FB W ANESTHESIA N/A 12/18/2016    Procedure: REMOVE FECAL IMPACTION/FOREIGN BODY UNDER ANESTHESIA;  Surgeon: Soham Cano MD;  [facility]: [facility]     KNEE SURGERY - removed a small tissue mass from knee Right      LAPAROSCOPIC ABLATION ENDOMETRIOSIS       LAPAROTOMY EXPLORATORY N/A 1/10/2017    Procedure: LAPAROTOMY EXPLORATORY;  Surgeon: Annmarie Haynes MD;  [facility]     LAPAROTOMY EXPLORATORY  09/11/2019    Manual manipulation of bowel to remove pill bottle in rectum     lymph nodes removed from neck; benign  age 7     MAMMOPLASTY REDUCTION Bilateral      RELEASE CARPAL TUNNEL Bilateral      SIGMOIDOSCOPY FLEXIBLE N/A 1/10/2017    Procedure: SIGMOIDOSCOPY FLEXIBLE;  Surgeon: Annmarie Haynes MD;  [facility]     SIGMOIDOSCOPY FLEXIBLE N/A 5/8/2018    Procedure: SIGMOIDOSCOPY FLEXIBLE;  flex sig with foreign body removal using snare and rattooth forcep;  Surgeon: Soham Cano MD;  Location:  GI     SIGMOIDOSCOPY FLEXIBLE N/A 2/20/2019    Procedure: Exam under anesthesia Colonoscopy with attempted  removal of rectal foreign body;  Surgeon: Estrada Chávez MD;  [facility]      Allergies   Allergen Reactions     Amoxicillin-Pot Clavulanate Other (See Comments), Swelling and Rash     PN: facial swelling, left side. Also had cortisone injection the same day as she started the Augmentin.  Noted in downtime recovery  of chart.    PN: facial swelling, left side. Also had cortisone injection the same day as she started the Augmentin.; HUT Comment: PN: facial swelling, left side. Also had cortisone injection the same day as she started the Augmentin.Noted in downtime recovery of chart.; HUT Reaction: Rash; HUT Reaction: Unknown; HUT Reaction Type: Allergy; HUT Severity: Med; HUT Noted: [id number]     Oseltamivir Hives     med stopped, PN: med stopped  med stopped, PN: med stopped; HUT Comment: med stopped, PN: med stopped; HUT Reaction: Hives; HUT Reaction Type: Allergy; HUT Severity: Med; HUT Noted: [id number]     Penicillins Anaphylaxis     HUT Reaction: Anaphylaxis; HUT Reaction Type: Allergy; HUT Severity: High; HUT Noted: [id number]     Vancomycin Itching, Swelling and Rash     Other reaction(s): Redness  Flushed face, very itchy; HUT Comment: Flushed face, very itchy; HUT Reaction: Angioedema; HUT Reaction: Redness; HUT Severity: Med; HUT Noted: [id number]    facial     Hydrocodone Nausea and Vomiting and GI Disturbance     vomiting for days, PN: vomiting for days; HUT Comment: vomiting for days; HUT Reaction: Gastrointestinal; HUT Reaction: Nausea And Vomiting; HUT Reaction Type: Intolerance; HUT Severity: Med; HUT Noted: [id number]  vomiting for days       Blood-Group Specific Substance Other (See Comments)     Patient has an anti-Cw and non-specific antibodies. Blood product orders may be delayed. Draw one red top and two purple top tubes for all type/screen/crossmatch orders.  Patient has anti-Cw, anti-K ([place]), Warm auto and nonspecific antibodies. Blood products may be delayed. Draw patient 24 hours prior to transfusion. Draw one red top and two purple top tubes for all type and screen orders.     Cephalosporins Rash     Influenza Vaccines Other (See Comments) and Nausea and Vomiting     HUT Reaction: Nausea And Vomiting; HUT Reaction Type: Intolerance; HUT Severity: Low; HUT Noted: [id number]     Lamotrigine Rash     Possibly  associated with Lamictal.   HUT Comment: Possibly associated with Lamictal. ; HUT Reaction: Rash; HUT Reaction Type: Allergy; HUT Severity: Low; HUT Noted: [id number]     Latex Rash     HUT Reaction: Rash; HUT Reaction Type: Allergy; HUT Severity: Low; HUT Noted: [id number]        Anesthesia Evaluation     . Pt has had prior anesthetic.     No history of anesthetic complications          ROS/MED HX    ENT/Pulmonary:       Neurologic:       Cardiovascular:         METS/Exercise Tolerance:     Hematologic:     (+) History of blood clots -      Musculoskeletal:         GI/Hepatic: Comment: Foreign body ingestion    (+) GERD       Renal/Genitourinary:      (-) renal disease   Endo:     (+) Obesity, .      Psychiatric:     (+) psychiatric history anxiety, depression, other (comment) and bipolar      Infectious Disease:         Malignancy:         Other:                         PHYSICAL EXAM:   Mental Status/Neuro: A/A/O   Airway: Facies: Feasible  Mallampati: I  Mouth/Opening: Full  TM distance: > 6 cm  Neck ROM: Full   Respiratory: Auscultation: CTAB     Resp. Rate: Normal     Resp. Effort: Normal      CV: Rhythm: Regular  Rate: Age appropriate  Heart: Normal Sounds  Edema: None   Comments:      Dental: Normal Dentition                Assessment:   ASA SCORE: 3 emergent   H&P: History and physical reviewed and following examination; no interval change.   Smoking Status:  Non-Smoker/Unknown        Plan:   Anes. Type:  General   Pre-Medication: None   Induction:  IV (Standard)   Airway: ETT; Oral   Access/Monitoring: PIV   Maintenance: Balanced     Postop Plan:   Postop Pain: Opioids  Postop Sedation/Airway: Not planned  Disposition: Outpatient     PONV Management:   Adult Risk Factors: Female, Postop Opioids   Prevention: Ondansetron     CONSENT: Direct conversation   Plan and risks discussed with: Patient   Blood Products: Consent Deferred (Minimal Blood Loss)       Comments for Plan/Consent:  Patient here for emergency   Upper endoscopy and foreign body retrieval.    Care plan in place: no benzodiazapines, limited or no opioids.                      MD Wale Carmen MD  Staff Anesthesiologist  [phone]

## 2020-09-19 NOTE — PROGRESS NOTES
09/19/20 1803   Patient Belongings   Did you bring any home meds/supplements to the hospital?  No   Patient Belongings remains with patient;sent to security per site process;locker   Patient Belongings Remaining with Patient clothing;glasses   Patient Belongings Put in Hospital Secure Location (Security or Locker, etc.) cash/credit card;cell phone/electronics;shoes;wallet   Belongings Search Yes   Clothing Search Yes   Second Staff Kathy CASTANO RN.     Personal belongings in locker:  Pink purse  Phone   Pen  Hand   Mask  Keys x6  Lanyard  Glasses case   X2 panty liner  X2 Pad  Pink wallet  [place] ID  Insurance card  [employer] membership card  Bus pass card    Belongings with patient:  Pants  Hooded sweatshirt  Underwear    Sent to security: #[id number]  Visa debit #[id number]  Visa debit #[id number]  [place] EBT #[id number]  Mastercard #[id number]  Visa debit #[id number]  Social security card  $5.55 change, $1 cash, Citizen of [place] darlene     Sent to security:#[id number]   Inhaler    A               Admission:  I am responsible for any personal items that are not sent to the safe or pharmacy.  [facility] is not responsible for loss, theft or damage of any property in my possession.    Signature:  _________________________________ Date: _______  Time: _____                                              Staff Signature:  ____________________________ Date: ________  Time: _____      2nd Staff person, if patient is unable/unwilling to sign:    Signature: ________________________________ Date: ________  Time: _____     Discharge:  [facility] has returned all of my personal belongings:    Signature: _________________________________ Date: ________  Time: _____                                          Staff Signature:  ____________________________ Date: ________  Time: _____

## 2020-09-19 NOTE — ANESTHESIA CARE TRANSFER NOTE
Patient: Nevin Alvarado    Procedure(s):  ESOPHAGOGASTRODUODENOSCOPY (EGD) with foreign body removal    Diagnosis: Foreign body in esophagus [T18.108A]  Diagnosis Additional Information: No value filed.    Anesthesia Type:   No value filed.     Note:  Airway :Face Mask  Patient transferred to:PACU  Handoff Report: Identifed the Patient, Identified the Reponsible Provider, Reviewed the pertinent medical history, Discussed the surgical course, Reviewed Intra-OP anesthesia mangement and issues during anesthesia, Set expectations for post-procedure period and Allowed opportunity for questions and acknowledgement of understanding      Vitals: (Last set prior to Anesthesia Care Transfer)    CRNA VITALS  9/18/2020 2053 - 9/18/2020 2127 9/18/2020             Pulse:  108    Ht Rate:  107    SpO2:  100 %                Electronically Signed By: Charles Patrick Schlatter, APRN CRNA  September 18, 2020  9:27 PM

## 2020-09-19 NOTE — ANESTHESIA POSTPROCEDURE EVALUATION
Anesthesia POST Procedure Evaluation    Patient: Nevin Alvarado   MRN:     [id number] Gender:   female   Age:    29 year old :      1990        Preoperative Diagnosis: Foreign body in esophagus [T18.108A]   Procedure(s):  ESOPHAGOGASTRODUODENOSCOPY (EGD) with foreign body removal   Postop Comments: No value filed.     Anesthesia Type: No value filed.       Disposition: Outpatient   Postop Pain Control: Uneventful            Sign Out: Well controlled pain   PONV: No   Neuro/Psych: Uneventful            Sign Out: Acceptable/Baseline neuro status   Airway/Respiratory: Uneventful            Sign Out: Acceptable/Baseline resp. status   CV/Hemodynamics: Uneventful            Sign Out: Acceptable CV status   Other NRE: NONE   DID A NON-ROUTINE EVENT OCCUR? No    Event details/Postop Comments:  Discharging to ED so that she can be admitted for psych evaluation.  GEOVANI Collins MD  Clinical   Anesthesia / Critical Care  *[id number]           Last Anesthesia Record Vitals:  CRNA VITALS  20203 - 2020 2153      2020             Pulse:  108    Ht Rate:  107    SpO2:  100 %          Last PACU Vitals:  Vitals Value Taken Time   /105 2020 10:10 PM   Temp 36.8  C (98.2  F) 2020 10:00 PM   Pulse 105 2020 10:12 PM   Resp 14 2020 10:00 PM   SpO2 98 % 2020 10:11 PM   Temp src     NIBP     Pulse     SpO2     Resp     Temp     Ht Rate     Temp 2     Vitals shown include unvalidated device data.      Electronically Signed By: Arnold Collins MD, 2020, 10:26 PM

## 2020-09-19 NOTE — TELEPHONE ENCOUNTER
S: [facility] ED called at 2130 to place a 28 y/o female for inpatient mental health treatment.    B: Pt with hx of bipolar disorder, ADD, anorexia, PTSD and frequent admissions for foreign body ingestion brought herself to the ED for evaluation of a mental health problem. Pt reports she is having difficulty regulating her mood and has been experiencing increased SIB and intrusive thoughts of ingesting objects. She reported that she had ingested a paperclip 10 minutes prior to arriving to the ED. Pt reports the increased SIB and intrusive thoughts have worsened over the past several weeks. Pt denies SI and HI. She reports that she has been experiencing excessive sleep, racing thoughts, tension, somatic symptoms. Pt has hx of multiple IP  admissions with the most recent being in March 2020. The paperclip was successfully removed via an EGD. She is asymptomatic for COVID-19. COVID test is pending.     A: Voluntary.    R: [facility] on call approved admission at 2150. Left message for unit 77 charge RN at 2154. Unit 77 charge RN notified at 2157. ED notified at 2158.  Patient cleared and ready for behavioral bed placement: Yes

## 2020-09-19 NOTE — BRIEF OP NOTE
[facility] Brief Operative Note    Pre-operative diagnosis: Foreign body in esophagus [T18.108A]   Post-operative diagnosis Paperclip in stomach   Procedure: Procedure(s):  ESOPHAGOGASTRODUODENOSCOPY (EGD) with foreign body removal   Surgeon: Dick Gillis MD   Assistants(s): Jihan Fuller MD   Estimated blood loss: Minimal    Specimens: None   Findings: Successful EGD with endoscopic extraction of intentionally swallowed unfolded paperclip.     Recommendations:  - Transfer patient back to ED to await pending admission to psychiatry.  - OK to start diet. Recommend against providing eating utensils though (suggest full liquid diet).  - Recommend bedside sitter to ensure there is no in-house attempt to ingest a foreign object.      Dick Gillis MD on 9/18/2020 at 9:23 PM

## 2020-09-19 NOTE — ED NOTES
"\"Charge Nurse Request Response\":  Communicates needs without difficulty. Denies SOB. Denies chest/shoulder/arm pain or discomfort. No acute distress noted. Pt has c/o not being cared for by the medical staff. Pt reports not seeing her nurse all day. Pt states that she has not been given her medications and that she is being told multiple things [facility]. Pt's nurse reports that the pt has been refusing her daily medication for a variety of reasons. Some of the medications were not 'timed' properly. Pt is angry and her story conflicts with what is being observed by her caregivers.  "

## 2020-09-20 VITALS
TEMPERATURE: 97.3 F | OXYGEN SATURATION: 100 % | DIASTOLIC BLOOD PRESSURE: 94 MMHG | HEART RATE: 100 BPM | SYSTOLIC BLOOD PRESSURE: 142 MMHG | RESPIRATION RATE: 16 BRPM

## 2020-09-20 PROBLEM — T14.91XA SUICIDE ATTEMPT (H): Status: ACTIVE | Noted: 2018-02-21

## 2020-09-20 PROBLEM — T14.91XA SUICIDE ATTEMPT (H): Status: RESOLVED | Noted: 2018-02-21 | Resolved: 2020-09-20

## 2020-09-20 PROBLEM — R45.851 SUICIDAL INTENT: Status: RESOLVED | Noted: 2017-04-20 | Resolved: 2020-09-20

## 2020-09-20 PROBLEM — R45.851 SUICIDAL IDEATION: Status: RESOLVED | Noted: 2017-06-09 | Resolved: 2020-09-20

## 2020-09-20 PROCEDURE — 99238 HOSP IP/OBS DSCHRG MGMT 30/<: CPT | Mod: AI | Performed by: PSYCHIATRY & NEUROLOGY

## 2020-09-20 PROCEDURE — 25000132 ZZH RX MED GY IP 250 OP 250 PS 637: Performed by: STUDENT IN AN ORGANIZED HEALTH CARE EDUCATION/TRAINING PROGRAM

## 2020-09-20 RX ORDER — VALACYCLOVIR HYDROCHLORIDE 1 G/1
2000 TABLET, FILM COATED ORAL 2 TIMES DAILY PRN
COMMUNITY

## 2020-09-20 RX ADMIN — CLONIDINE HYDROCHLORIDE 0.1 MG: 0.1 TABLET ORAL at 08:12

## 2020-09-20 RX ADMIN — PREGABALIN 50 MG: 50 CAPSULE ORAL at 08:12

## 2020-09-20 RX ADMIN — Medication 2000 UNITS: at 08:12

## 2020-09-20 RX ADMIN — HYDROXYCHLOROQUINE SULFATE 200 MG: 200 TABLET, FILM COATED ORAL at 08:12

## 2020-09-20 RX ADMIN — BUSPIRONE HYDROCHLORIDE 15 MG: 15 TABLET ORAL at 08:12

## 2020-09-20 RX ADMIN — DESVENLAFAXINE SUCCINATE 100 MG: 50 TABLET, EXTENDED RELEASE ORAL at 08:12

## 2020-09-20 NOTE — PHARMACY-ADMISSION MEDICATION HISTORY
Admission Medication History Completed by Pharmacy    See [facility] Admission Navigator for allergy information, preferred outpatient pharmacy, prior to admission medications and immunization status.     Medication History Sources:     Surescripts, CareEverywhere, and patient interview    Changes made to PTA medication list (reason):    Added: Valacyclovir     Deleted: pt reported old/completed prescriptions after verifying last fills (Tylenol liquid, hydroxyzine, lidocaine solution, Zofran ODT, and topiramate)    Changed: None    Additional Information:    None    Prior to Admission medications    Medication Sig Last Dose Taking? Auth Provider   valACYclovir (VALTREX) 1000 mg tablet Take 1,000 mg by mouth Take 2 tablets by mouth two times daily for one day. Use as needed at onset of cold sore.  Yes Unknown, Entered By History   albuterol (PROAIR HFA/PROVENTIL HFA/VENTOLIN HFA) 108 (90 Base) MCG/ACT inhaler Inhale 2 puffs into the lungs as needed for shortness of breath / dyspnea or wheezing Unknown at PRN  Reported, Patient   busPIRone (BUSPAR) 15 MG tablet Take 1 tablet (15 mg) by mouth 2 times daily Unknown at Unknown time  Her, Julieth KAT   Cholecalciferol (VITAMIN D) 50 MCG (2000 UT) CAPS Take 2,000 Units by mouth daily  Unknown at Unknown time  Unknown, Entered By History   cloNIDine (CATAPRES) 0.1 MG tablet Take 0.1 mg by mouth 2 times daily  Unknown at Unknown time  Reported, Patient   desvenlafaxine (PRISTIQ) 100 MG 24 hr tablet Take 1 tablet (100 mg) by mouth every morning Unknown at Unknown time  Reported, Patient   docosanol (ABREVA) 10 % CREA cream Apply to lip 5 times a day as soon as symptoms begin, do not use for more than 10 days. Used PRN. Unknown at PRN  Unknown, Entered By History   hydroxychloroquine (PLAQUENIL) 200 MG tablet Take 200 mg by mouth 2 times daily Unknown at Unknown time  Reported, Patient   lurasidone (LATUDA) 60 MG TABS tablet Take 60 mg by mouth daily (with dinner)  Unknown at Unknown  time  Reported, Patient   metFORMIN (GLUCOPHAGE-XR) 500 MG 24 hr tablet Take 1,000 mg by mouth Take 1 tablet (500 mg) by mouth daily  Unknown at Unknown time  Unknown, Entered By History   norethindrone (MICRONOR) 0.35 MG tablet Take 0.35 mg by mouth daily Unknown at Unknown time  Reported, Patient   pantoprazole (PROTONIX) 40 MG EC tablet Take 1 tablet (40 mg) by mouth daily Unknown at Unknown time  Quinten Lamb MD   pregabalin (LYRICA) 50 MG capsule Take 50 mg by mouth 3 times daily Unknown at Unknown time  Reported, Patient   Prenatal Vit-Fe Fumarate-FA (PNV PRENATAL PLUS MULTIVITAMIN) 27-1 MG TABS per tablet Take 1 tablet by mouth daily Unknown at Unknown time  Reported, Patient       Date completed: 09/20/20    Medication history completed by:     Nicollette McMann, [place]  [facility] Building: Ascom *[phone] or *[phone]

## 2020-09-20 NOTE — PROGRESS NOTES
[facility] was informed that the patient may possibly be discharged home today. The patient's AVS has been completed in the event of a discharge.

## 2020-09-20 NOTE — DISCHARGE INSTRUCTIONS
Behavioral Discharge Planning and Instructions  Summary:  You were admitted on 9/18/2020  due to Increased urges of SIB and foreign obeject ingestion.  You were treated by Dr. Perico Ordonez MD and discharged on 09/20/2020 from [facility] to Home      Principal Diagnosis:   Borderline Personality Disorder  Anxiety  Depression  Post Traumatic Stress Disorder    Health Care Follow-up Appointments:  Medication Management  Wednesday, October 14th Dr. Brionna Lopez [facility]  [address]  [place] [address]  Phone: [phone]  Fax: [phone]  Please be sure to bring your insurance card and ID to present to the reception staff. If you need to reschedule thisappointment at any point, please do so at least 24 hours prior to the start of the scheduled appointment.    Outpatient Therapy  Wednesday, September 24th at 10:00am with Donna  [facility]  [address] [place] [address]  Phone: [phone]  Fax: [phone]  Please be sure to bring your insurance card and ID to present to the reception staff. If you need to reschedule thisappointment at any point, please do so at least 24 hours prior to the start of the scheduled appointment.    Attend all scheduled appointments with your outpatient providers. Call at least 24 hours in advance if you need to reschedule an appointment to ensure continued access to your outpatient providers.   Major Treatments, Procedures and Findings:  You were provided with: a psychiatric assessment, medication evaluation and/or management and group therapy    Symptoms to Report: feeling more aggressive, increased confusion, losing more sleep, mood getting worse or thoughts of suicide    Early warning signs can include: increased depression or anxiety sleep disturbances increased thoughts or behaviors of suicide or self-harm  increased unusual thinking, such as paranoia or hearing voices    Safety and Wellness:  Take all  "medicines as directed.  Make no changes unless your doctor suggests them. Follow treatment recommendations.  Refrain from alcohol and non-prescribed drugs.  Ask your support system to help you reduce your access to items that could harm yourself or others. Items could include:    - Firearms  - Medicines (both prescribed and over-the-counter)  - Knives and other sharp objects  - Ropes and like materials  - Car keys  If there is a concern for safety, call 911. If there is a concern for safety, call 911.    Resources:   [facility] Crisis Response 228-993-8584  Crisis Intervention: 108.964.8767 or 277-401-3803 (TTY: 491.801.1389).  Call anytime for help.  [employer] [place] [employer]ness (www.[place].darnell.org): 914.226.2946 or 409-030-1517.  [place] Association for Children's Mental Health (www.mac.org): 350.627.2580.  Suicide Awareness Voices of Education (SAVE) (www.save.org): 150-359-ZFPC (2833)  National Suicide Prevention Line (www.mentalhealthmn.org): 051-940-XFSH (9860)  Mental Health Consumer/Survivor Network of [place] (www.mhcsn.net): 310.186.8219 or 217-051-4257  Mental Health Association of [place] (www.mentalhealth.org): 209.857.1640 or 776-986-4723  Self- Management and Recovery Training., SMART-- Toll free: 595.666.9494  www.Room.grabHalo  Text 4 Life: txt \"LIFE\" to 25536 for immediate support and crisis intervention  Crisis text line: Text \"[place]\" to 653189. Free, confidential, 24/7.  Crisis Intervention: 314.589.2074 or 569-850-9522. Call anytime for help.     The treatment team has appreciated the opportunity to work with you.     If you have any questions or concerns our unit number is [phone].  "

## 2020-09-20 NOTE — H&P
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 patient    Patient will be treated in therapeutic milieu with appropriate individual and group therapies.    Laboratory/Imaging:  - CMP, CBC wnl  - TSH elevated, T4 normal  - UDS was positive for benzos    Legal Status:   Orders Placed This Encounter      Voluntary      Safety Assessment:    Behavioral Orders   Procedures    Code 1 - Restrict to Unit    Electroconvulsive therapy     Must order EKG, Hemogram & Basic Metabolic Battery, if not done within last 30 days    Routine Programming     As clinically indicated    Self Injury Precaution    Status 15     Every 15 minutes.    Status Individual Observation     Patient SIO status reviewed with team/RN.  Please also refer to RN/team documentation for add'l  detail.    -SIO staff to monitor following which have contributed to patient being on SIO:  Ingestion risk    -Possible interventions SIO staff could use to support patient's treatment progress:  Behavioral redirection, environmental safety checks    -When following observed, team will review discontinuation of SIO:  Rudi for safety and no attempts at ingestion for 24hrs     Order Specific Question:   CONTINUOUS 24 hours / day     Answer:   1 foot     Order Specific Question:   Indications for SIO     Answer:   Self-injury risk    Suicide precautions     Patients on Suicide Precautions should have a Combination Diet ordered that includes a Diet selection(s) AND a Behavioral Tray selection for Safe Tray - with utensils, or Safe Tray - NO utensils          Consults:  - none    Medical diagnoses to be addressed this admission:     # Obesity  - Continua PTA Metformin    # Neuropathy   - Continue Lyrica 50mg TID  - Uses walker at home, order placed       Dispo: On admission, pt had recently exhibited SIB that was significant enough to warrant inpatient psychiatric hospitalization. Today she was deemed appropriate for voluntary discharge given she was reporting reduction in self injurious ideations and described adequate safety plan with multiple available resources verified directly prior to discharge, and plan for contact with supports both in the evening and on the next day.      Patient was seen and staffed with the attending physician.   ----------------------------------------------------------------------------------------------------------------  Luz Patterson MD  PGY2 Psychiatry Resident

## 2020-09-20 NOTE — PROGRESS NOTES
"Pt expressed concerns about wanting to leave. She explained that she \"never meant to be inpatient for this long,\" and she was just looking for immediate crisis help. She denies any current SIB thoughts or urges. She talked with her provider about discharge plans for today because she would like to discharge today and believes she has proper resources in the community to help her. She presents as blunted and flat, her mood is calm but at times irritable. She is isolative and withdrawn. She denied any SI or SIB.        09/20/20 1000   Behavioral Health   Hallucinations denies / not responding to hallucinations   Thinking intact   Orientation person: oriented;place: oriented;date: oriented;time: oriented   Memory baseline memory   Insight admits / accepts   Judgement impaired   Eye Contact at examiner   Affect blunted, flat;irritable   Mood irritable;mood is calm   Physical Appearance/Attire attire appropriate to age and situation   Hygiene other (see comment)  (fair)   Suicidality other (see comments)  (pt denies)   1. Wish to be Dead (Recent) No   2. Non-Specific Active Suicidal Thoughts (Recent) No   Self Injury other (see comment)  (pt denies)   Elopement   (none stated or observed)   Activity isolative;withdrawn   Speech clear;coherent   Medication Sensitivity no stated side effects;no observed side effects   Psychomotor / Gait balanced;steady  (uses a walker)     "

## 2020-09-20 NOTE — PLAN OF CARE
Discharge:  alert and orientated x 4.  Denies suicidal thoughts, reports no longer has any thoughts or urges to ingest foreign objects.  States has a good support system and feels capable of using her coping skills.  Received written discharge instructions, with verbal explanation.  All questions answered, verbalizes understanding.  All belongings returned. Has all needed medications at home.  Discharged at 13:35 via cab to home.

## 2020-09-20 NOTE — PROGRESS NOTES
Initial Psychosocial Assessment  I have reviewed the chart, met with the patient, and developed Care Plan. Information for assessment was obtained from the patient, the patient's medical chart, and the patient's DEC assessment.      Presenting Problem: Shannan Alvarado is a 29 year old female who presented to the ED with complaints of a possible foreign object ingestion. According to the patient, she was experiencing increased anxiety and was not able to secure a crisis bed so she swallowed a paperclip. The patient reported increased depression due to a medication change. The patient stated that she has multiple appointments set and would like to leave as soon as she can.     There patient's UTOX was positive for Benzodiazepine. The patient denied any current SI to this writer and reported wanting to go home.      History of Mental Health and Chemical Dependency: The patient has a significant history of inpatient mental health hospitalizations for foreign obejct ingestion or insertion. The patient's last documented ingestion with the intent of self harm was on 2/13/18. The patient has also had at least 3 overdoses within the last 3 years with the last being on 4/17/17 with an overdose on Oxycodone. According to the patient's chart, she was last hospitalized at [facility] in January 2020 for the ingestion of a stretched out spring. She was hospitalized for one day. The patient was also hospitalized at [facility] in March 2020 on station 20.     Past Diagnosis: PTSD, BPD, Depression, Anxiety, and ADD    History of SI or SIB: The patient has had multiple suicide attempts.     History of Aggression: None.     Significant Life Events  (Illness, Abuse, Trauma, Death): The patient denied any hx of significant life events.      Family/Living Situation: The patient is currently living in a independent living facility that is staffed 24/7. The patient denied having nay contact with her family members and did not want to      Familial History of Mental Illness: The patient denied.      Educational Background: Unable to assess.      Financial Status (Employment/Income): The patient is not currently employed.      Legal Issues: The patient is currently hospitalized voluntarily. The patient has a court appearance on Thursday, September 24th at 9am for guardianship appointment in [facility].     Legal Guardian: The patient previously had a legal guardian, Marianna with [facility] [employer] ([phone]), but reported that she is no longer her guardian. The patient stated that she has a court date on Thursday to be appointed a new guardian.       Ethnic/Cultural Considerations: None.       Spiritual Orientation: None.         Service History: None.      Social Functioning (organization, interests, Strengths): Patient reported that she enjoys arts and crafts and puzzles.     Goals for Hospitalization: The patient stated that she would like to be discharged as soon as possible so that she can attend her ILS appointment on Monday.       Current Treatment Providers are:  Primary Care: Latonya Knight with [facility] in [place].    Psychiatry: Dr. Brionna De La Rosa with [facility].   Therapist: Donna at [facility].   : Solange with [facility] ([phone])    Social Service Assessment/Plan: CTC will explore options for follow up care and  provide a psychological assessment.Patient will be provided with a safe environment, medication management, as well as offered groups for coping skills.
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
## 2020-09-22 ENCOUNTER — TELEPHONE (OUTPATIENT)
Dept: INTERNAL MEDICINE | Facility: CLINIC | Age: 30
End: 2020-09-22

## 2020-09-22 NOTE — TELEPHONE ENCOUNTER
Please call patient for hospital follow up. She appears to have a new PCP but was sent to us on the discharge report.

## 2020-09-22 NOTE — TELEPHONE ENCOUNTER
Pt is established with [facility] primary care, and various other specialities.     Triage, please call patient for hospital discharge follow-up if you see fit. Thanks!

## 2020-09-28 ENCOUNTER — APPOINTMENT (OUTPATIENT)
Dept: GENERAL RADIOLOGY | Facility: CLINIC | Age: 30
End: 2020-09-28
Attending: EMERGENCY MEDICINE
Payer: COMMERCIAL

## 2020-09-28 ENCOUNTER — APPOINTMENT (OUTPATIENT)
Dept: CT IMAGING | Facility: CLINIC | Age: 30
End: 2020-09-28
Attending: EMERGENCY MEDICINE
Payer: COMMERCIAL

## 2020-09-28 ENCOUNTER — HOSPITAL ENCOUNTER (EMERGENCY)
Facility: CLINIC | Age: 30
Discharge: HOME | End: 2020-09-28
Attending: EMERGENCY MEDICINE | Admitting: EMERGENCY MEDICINE
Payer: COMMERCIAL

## 2020-09-28 VITALS
OXYGEN SATURATION: 96 % | SYSTOLIC BLOOD PRESSURE: 165 MMHG | WEIGHT: 267 LBS | RESPIRATION RATE: 16 BRPM | TEMPERATURE: 98.3 F | BODY MASS INDEX: 49.13 KG/M2 | HEIGHT: 62 IN | HEART RATE: 118 BPM | DIASTOLIC BLOOD PRESSURE: 96 MMHG
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
## 2020-09-28 DIAGNOSIS — M25.511 RIGHT SHOULDER PAIN: ICD-10-CM

## 2020-09-28 DIAGNOSIS — M54.2 CERVICALGIA: ICD-10-CM

## 2020-09-28 DIAGNOSIS — M54.50 LUMBAGO: ICD-10-CM

## 2020-09-28 DIAGNOSIS — S09.90XA HEAD INJURY: ICD-10-CM

## 2020-09-28 PROCEDURE — 99285 EMERGENCY DEPT VISIT HI MDM: CPT | Mod: 25

## 2020-09-28 PROCEDURE — 99284 EMERGENCY DEPT VISIT MOD MDM: CPT | Mod: Z6 | Performed by: EMERGENCY MEDICINE

## 2020-09-28 PROCEDURE — 72072 X-RAY EXAM THORAC SPINE 3VWS: CPT

## 2020-09-28 PROCEDURE — 70450 CT HEAD/BRAIN W/O DYE: CPT

## 2020-09-28 PROCEDURE — 72125 CT NECK SPINE W/O DYE: CPT

## 2020-09-28 PROCEDURE — 73030 X-RAY EXAM OF SHOULDER: CPT | Mod: RT

## 2020-09-28 PROCEDURE — 25000132 ZZH RX MED GY IP 250 OP 250 PS 637: Performed by: EMERGENCY MEDICINE

## 2020-09-28 PROCEDURE — 72100 X-RAY EXAM L-S SPINE 2/3 VWS: CPT

## 2020-09-28 RX ORDER — OXYCODONE HYDROCHLORIDE 5 MG/1
5 TABLET ORAL ONCE
Status: COMPLETED | OUTPATIENT
Start: 2020-09-28 | End: 2020-09-28

## 2020-09-28 RX ORDER — ACETAMINOPHEN 325 MG/1
975 TABLET ORAL ONCE
Status: COMPLETED | OUTPATIENT
Start: 2020-09-28 | End: 2020-09-28

## 2020-09-28 RX ADMIN — ACETAMINOPHEN 975 MG: 325 TABLET, FILM COATED ORAL at 22:23

## 2020-09-28 RX ADMIN — OXYCODONE HYDROCHLORIDE 5 MG: 5 TABLET ORAL at 20:08

## 2020-09-28 ASSESSMENT — MIFFLIN-ST. JEOR: SCORE: 1894.35

## 2020-09-28 NOTE — ED NOTES
Bed: ED26  Expected date:   Expected time:   Means of arrival:   Comments:  [place] 29F shoulder pain

## 2020-09-28 NOTE — ED AVS SNAPSHOT
[facility], [place], Emergency Department  [address] [place] [id number]  Phone:  [phone]                                    Nevin Alvarado   MRN: [id number]    Department:  [facility], Emergency Department   Date of Visit:  9/28/2020           After Visit Summary Signature Page    I have received my discharge instructions, and my questions have been answered. I have discussed any challenges I see with this plan with the nurse or doctor.    ..........................................................................................................................................  Patient/Patient Representative Signature      ..........................................................................................................................................  Patient Representative Print Name and Relationship to Patient    ..................................................               ................................................  Date                                   Time    ..........................................................................................................................................  Reviewed by Signature/Title    ...................................................              ..............................................  Date                                               Time          22EPIC Rev 08/18

## 2020-09-28 NOTE — ED PROVIDER NOTES
ED Provider Note  [facility]      History     Chief Complaint   Patient presents with     Fall     Neck Pain     The history is provided by the patient and medical records.     Nevin Alvarado is a 29 year old female with a PMH for chronic pain, self injurious behavior, chronic abdominal and pelvic pain who presents to the ED via EMS with complaint of fall and subsequent head, neck and right shoulder pain. Patient reports falling down 7 stairs and hitting her head. She states that she was wearing crocs and that caused her to fall down the stairs. The patient reports that she remembers falling down the stairs and hitting her head but is unsure what happened after so she could have lost consciousness. She reports having pain in her upper back into her neck and then into her right shoulder. The patient states that she also has lower back pain on the right side. She took tylenol and ibuprofen which did not relieve her pain. There were no other symptoms noted.    Past Medical History  Past Medical History:   Diagnosis Date     ADD (attention deficit disorder)      Anorexia nervosa with bulimia     history of; on Topamax     Anxiety      Borderline personality disorder (H)      Depression      H/O self-harm      History of pulmonary embolism 12/2019    Provoked. Completed 3 month course of Apixaban     Morbid obesity (H)      Neuropathy      PTSD (post-traumatic stress disorder)      Rectal foreign body - Recurrent issue, self placed      Suicide attempt (H) 2/21/2018     Swallowed foreign body - Recurrent issue, self placed      Past Surgical History:   Procedure Laterality Date     COMBINED ESOPHAGOSCOPY, GASTROSCOPY, DUODENOSCOPY (EGD), REPLACE ESOPHAGEAL STENT N/A 10/9/2019    Procedure: Upper Endoscopy with Suture Placement;  Surgeon: Zurdo Ramirez MD;  [facility]     ESOPHAGOSCOPY, GASTROSCOPY, DUODENOSCOPY (EGD), COMBINED N/A 3/9/2017    Procedure: COMBINED  ESOPHAGOSCOPY, GASTROSCOPY, DUODENOSCOPY (EGD), REMOVE FOREIGN BODY;  Surgeon: Avis Guzmán MD;  [facility]     ESOPHAGOSCOPY, GASTROSCOPY, DUODENOSCOPY (EGD), COMBINED N/A 4/20/2017    Procedure: COMBINED ESOPHAGOSCOPY, GASTROSCOPY, DUODENOSCOPY (EGD), REMOVE FOREIGN BODY;  EGD removal Foregin body;  Surgeon: Lokesh Paula MD;  [facility]     ESOPHAGOSCOPY, GASTROSCOPY, DUODENOSCOPY (EGD), COMBINED N/A 6/12/2017    Procedure: COMBINED ESOPHAGOSCOPY, GASTROSCOPY, DUODENOSCOPY (EGD);  COMBINED ESOPHAGOSCOPY, GASTROSCOPY, DUODENOSCOPY (EGD) [[id number]]attempted removal of foreign body;  Surgeon: Pamela Perez MD;  [facility]     ESOPHAGOSCOPY, GASTROSCOPY, DUODENOSCOPY (EGD), COMBINED N/A 6/9/2017    Procedure: COMBINED ESOPHAGOSCOPY, GASTROSCOPY, DUODENOSCOPY (EGD), REMOVE FOREIGN BODY;  Esophagoscopy, Gastroscopy, Duodenoscopy, Removal of Foreign Body;  Surgeon: Dejon Marsh MD;  [facility]     ESOPHAGOSCOPY, GASTROSCOPY, DUODENOSCOPY (EGD), COMBINED N/A 1/6/2018    Procedure: COMBINED ESOPHAGOSCOPY, GASTROSCOPY, DUODENOSCOPY (EGD), REMOVE FOREIGN BODY;  COMBINED ESOPHAGOSCOPY, GASTROSCOPY, DUODENOSCOPY (EGD) [by pascal net and snare with profol sedation;  Surgeon: Feliciano Emmanuel MD;  Location:  GI     ESOPHAGOSCOPY, GASTROSCOPY, DUODENOSCOPY (EGD), COMBINED N/A 3/19/2018    Procedure: COMBINED ESOPHAGOSCOPY, GASTROSCOPY, DUODENOSCOPY (EGD), REMOVE FOREIGN BODY;   Esophagodscopy, Gastroscopy, Duodenoscopy,Foreign Body Removal;  Surgeon: Brice Guzmán MD;  [facility]     ESOPHAGOSCOPY, GASTROSCOPY, DUODENOSCOPY (EGD), COMBINED N/A 4/16/2018    Procedure: COMBINED ESOPHAGOSCOPY, GASTROSCOPY, DUODENOSCOPY (EGD), REMOVE FOREIGN BODY;  Esophagogastroduodenoscopy  Foreign Body Removal X 2;  Surgeon: Royer Moise MD;  [facility]     ESOPHAGOSCOPY, GASTROSCOPY, DUODENOSCOPY (EGD), COMBINED N/A 6/1/2018    Procedure:  COMBINED ESOPHAGOSCOPY, GASTROSCOPY, DUODENOSCOPY (EGD), REMOVE FOREIGN BODY;  COMBINED ESOPHAGOSCOPY, GASTROSCOPY, DUODENOSCOPY with removal of foreign body, propofol sedation by anesthesia;  Surgeon: Brice Martinez MD;  Location:  GI     ESOPHAGOSCOPY, GASTROSCOPY, DUODENOSCOPY (EGD), COMBINED N/A 7/25/2018    Procedure: COMBINED ESOPHAGOSCOPY, GASTROSCOPY, DUODENOSCOPY (EGD), REMOVE FOREIGN BODY;;  Surgeon: Candy Castelan MD;  Location:  GI     ESOPHAGOSCOPY, GASTROSCOPY, DUODENOSCOPY (EGD), COMBINED N/A 7/28/2018    Procedure: COMBINED ESOPHAGOSCOPY, GASTROSCOPY, DUODENOSCOPY (EGD), REMOVE FOREIGN BODY;  COMBINED ESOPHAGOSCOPY, GASTROSCOPY, DUODENOSCOPY (EGD), REMOVE FOREIGN BODY;  Surgeon: Brice Guzmán MD;  [facility]     ESOPHAGOSCOPY, GASTROSCOPY, DUODENOSCOPY (EGD), COMBINED N/A 7/31/2018    Procedure: COMBINED ESOPHAGOSCOPY, GASTROSCOPY, DUODENOSCOPY (EGD);  COMBINED ESOPHAGOSCOPY, GASTROSCOPY, DUODENOSCOPY (EGD) TO REMOVE FOREIGN BODY;  Surgeon: Lokesh Paula MD;  [facility]     ESOPHAGOSCOPY, GASTROSCOPY, DUODENOSCOPY (EGD), COMBINED N/A 8/4/2018    Procedure: COMBINED ESOPHAGOSCOPY, GASTROSCOPY, DUODENOSCOPY (EGD), REMOVE FOREIGN BODY;   combined esophagoscopy, gastroscopy, duodenoscopy, REMOVE FOREIGN BODY ;  Surgeon: Lokesh Paula MD;  [facility]     ESOPHAGOSCOPY, GASTROSCOPY, DUODENOSCOPY (EGD), COMBINED N/A 10/6/2019    Procedure: ESOPHAGOGASTRODUODENOSCOPY (EGD) with fireign body removal x2, esophageal stent placement with floroscopy;  Surgeon: Timoteo Espana MD;  [facility]     ESOPHAGOSCOPY, GASTROSCOPY, DUODENOSCOPY (EGD), COMBINED N/A 12/2/2019    Procedure: Esophagogastroduodenoscopy with esophageal stent removal, esophogram;  Surgeon: Kailee eTna MD;  [facility]     ESOPHAGOSCOPY, GASTROSCOPY, DUODENOSCOPY (EGD), COMBINED N/A 12/17/2019    Procedure: ESOPHAGOGASTRODUODENOSCOPY, WITH FOREIGN BODY REMOVAL;  Surgeon:  Pamela Perez MD;  [facility]     ESOPHAGOSCOPY, GASTROSCOPY, DUODENOSCOPY (EGD), COMBINED N/A 12/13/2019    Procedure: ESOPHAGOGASTRODUODENOSCOPY, WITH FOREIGN BODY REMOVAL;  Surgeon: Samia Stanton MD;  [facility]     ESOPHAGOSCOPY, GASTROSCOPY, DUODENOSCOPY (EGD), COMBINED N/A 12/28/2019    Procedure: ESOPHAGOGASTRODUODENOSCOPY (EGD) Removal of Foreign Body X 2;  Surgeon: Huy Kelley MD;  [facility]     ESOPHAGOSCOPY, GASTROSCOPY, DUODENOSCOPY (EGD), COMBINED N/A 1/5/2020    Procedure: ESOPHAGOGASTRODUOENOSCOPY WITH FOREIGN BODY REMOVAL;  Surgeon: Pamela Perez MD;  [facility]     ESOPHAGOSCOPY, GASTROSCOPY, DUODENOSCOPY (EGD), COMBINED N/A 1/3/2020    Procedure: ESOPHAGOGASTRODUODENOSCOPY (EGD) REMOVAL OF FOREIGN BODY.;  Surgeon: Pamela Perez MD;  [facility]     ESOPHAGOSCOPY, GASTROSCOPY, DUODENOSCOPY (EGD), COMBINED N/A 1/13/2020    Procedure: ESOPHAGOGASTRODUODENOSCOPY (EGD) for foreign body removal;  Surgeon: Lokesh Paula MD;  [facility]     ESOPHAGOSCOPY, GASTROSCOPY, DUODENOSCOPY (EGD), COMBINED N/A 1/18/2020    Procedure: Diagnostic ESOPHAGOGASTRODUODENOSCOPY (EGD;  Surgeon: Lokesh Paula MD;  [facility]     ESOPHAGOSCOPY, GASTROSCOPY, DUODENOSCOPY (EGD), COMBINED N/A 3/29/2020    Procedure: UPPER ENDOSCOPY WITH FOREIGN BODY REMOVAL;  Surgeon: Doug Hansen MD;  [facility]     ESOPHAGOSCOPY, GASTROSCOPY, DUODENOSCOPY (EGD), COMBINED N/A 7/11/2020    Procedure: ESOPHAGOGASTRODUODENOSCOPY (EGD); Upper Endoscopy WITH FOREIGN BODY REMOVAL;  Surgeon: Lyndsey Mendoza DO;  [facility]     ESOPHAGOSCOPY, GASTROSCOPY, DUODENOSCOPY (EGD), COMBINED N/A 7/29/2020    Procedure: ESOPHAGOGASTRODUODENOSCOPY REMOVAL OF FOREIGN BODY;  Surgeon: Samia Stanton MD;  [facility]     ESOPHAGOSCOPY, GASTROSCOPY, DUODENOSCOPY (EGD), COMBINED N/A 8/1/2020    Procedure: ESOPHAGOGASTRODUODENOSCOPY,  WITH FOREIGN BODY REMOVAL;  Surgeon: Pamela Perez MD;  [facility]     ESOPHAGOSCOPY, GASTROSCOPY, DUODENOSCOPY (EGD), COMBINED N/A 8/18/2020    Procedure: ESOPHAGOGASTRODUODENOSCOPY (EGD) for foreign body removal;  Surgeon: Pamela Perez MD;  [facility]     ESOPHAGOSCOPY, GASTROSCOPY, DUODENOSCOPY (EGD), COMBINED N/A 8/27/2020    Procedure: ESOPHAGOGASTRODUODENOSCOPY (EGD) with foreign body removal;  Surgeon: Campbell Rogers MD;  [facility]     ESOPHAGOSCOPY, GASTROSCOPY, DUODENOSCOPY (EGD), COMBINED N/A 9/18/2020    Procedure: ESOPHAGOGASTRODUODENOSCOPY (EGD) with foreign body removal;  Surgeon: Dick Gillis MD;  [facility]     EXAM UNDER ANESTHESIA ANUS N/A 1/10/2017    Procedure: EXAM UNDER ANESTHESIA ANUS;  Surgeon: Annmarie Haynes MD;  [facility]     EXAM UNDER ANESTHESIA RECTUM N/A 7/19/2018    Procedure: EXAM UNDER ANESTHESIA RECTUM;  EXAM UNDER ANESTHESIA, REMOVAL OF RECTAL FOREIGN BODY;  Surgeon: Annmarie Haynes MD;  [facility]     HC REMOVE FECAL IMPACTION OR FB W ANESTHESIA N/A 12/18/2016    Procedure: REMOVE FECAL IMPACTION/FOREIGN BODY UNDER ANESTHESIA;  Surgeon: Soham Cano MD;  [facility]     KNEE SURGERY - removed a small tissue mass from knee Right      LAPAROSCOPIC ABLATION ENDOMETRIOSIS       LAPAROTOMY EXPLORATORY N/A 1/10/2017    Procedure: LAPAROTOMY EXPLORATORY;  Surgeon: Annmarie Haynes MD;  [facility]     LAPAROTOMY EXPLORATORY  09/11/2019    Manual manipulation of bowel to remove pill bottle in rectum     lymph nodes removed from neck; benign  age 7     MAMMOPLASTY REDUCTION Bilateral      RELEASE CARPAL TUNNEL Bilateral      SIGMOIDOSCOPY FLEXIBLE N/A 1/10/2017    Procedure: SIGMOIDOSCOPY FLEXIBLE;  Surgeon: Annmarie Haynes MD;  [facility]     SIGMOIDOSCOPY FLEXIBLE N/A 5/8/2018    Procedure: SIGMOIDOSCOPY FLEXIBLE;  flex sig with foreign body  removal using snare and rattooth forcep;  Surgeon: Soham Cano MD;  Location:  GI     SIGMOIDOSCOPY FLEXIBLE N/A 2/20/2019    Procedure: Exam under anesthesia Colonoscopy with attempted  removal of rectal foreign body;  Surgeon: Estrada Chávez MD;  [facility]     albuterol (PROAIR HFA/PROVENTIL HFA/VENTOLIN HFA) 108 (90 Base) MCG/ACT inhaler  busPIRone (BUSPAR) 15 MG tablet  Cholecalciferol (VITAMIN D) 50 MCG (2000 UT) CAPS  cloNIDine (CATAPRES) 0.1 MG tablet  desvenlafaxine (PRISTIQ) 100 MG 24 hr tablet  docosanol (ABREVA) 10 % CREA cream  hydroxychloroquine (PLAQUENIL) 200 MG tablet  lurasidone (LATUDA) 60 MG TABS tablet  metFORMIN (GLUCOPHAGE-XR) 500 MG 24 hr tablet  norethindrone (MICRONOR) 0.35 MG tablet  pantoprazole (PROTONIX) 40 MG EC tablet  pregabalin (LYRICA) 50 MG capsule  Prenatal Vit-Fe Fumarate-FA (PNV PRENATAL PLUS MULTIVITAMIN) 27-1 MG TABS per tablet  valACYclovir (VALTREX) 1000 mg tablet      Allergies   Allergen Reactions     Amoxicillin-Pot Clavulanate Other (See Comments), Swelling and Rash     PN: facial swelling, left side. Also had cortisone injection the same day as she started the Augmentin.  Noted in downtime recovery of chart.    PN: facial swelling, left side. Also had cortisone injection the same day as she started the Augmentin.; HUT Comment: PN: facial swelling, left side. Also had cortisone injection the same day as she started the Augmentin.Noted in downtime recovery of chart.; HUT Reaction: Rash; HUT Reaction: Unknown; HUT Reaction Type: Allergy; HUT Severity: Med; HUT Noted: [id number]     Oseltamivir Hives     med stopped, PN: med stopped  med stopped, PN: med stopped; HUT Comment: med stopped, PN: med stopped; HUT Reaction: Hives; HUT Reaction Type: Allergy; HUT Severity: Med; HUT Noted: [id number]     Penicillins Anaphylaxis     HUT Reaction: Anaphylaxis; HUT Reaction Type: Allergy; HUT Severity: High; HUT Noted: [id number]     Vancomycin Itching, Swelling and Rash      Other reaction(s): Redness  Flushed face, very itchy; HUT Comment: Flushed face, very itchy; HUT Reaction: Angioedema; HUT Reaction: Redness; HUT Severity: Med; HUT Noted: [id number]    facial     Hydrocodone Nausea and Vomiting and GI Disturbance     vomiting for days, PN: vomiting for days; HUT Comment: vomiting for days; HUT Reaction: Gastrointestinal; HUT Reaction: Nausea And Vomiting; HUT Reaction Type: Intolerance; HUT Severity: Med; HUT Noted: [id number]  vomiting for days       Blood-Group Specific Substance Other (See Comments)     Patient has an anti-Cw and non-specific antibodies. Blood product orders may be delayed. Draw one red top and two purple top tubes for all type/screen/crossmatch orders.  Patient has anti-Cw, anti-K (Angella), Warm auto and nonspecific antibodies. Blood products may be delayed. Draw patient 24 hours prior to transfusion. Draw one red top and two purple top tubes for all type and screen orders.     Cephalosporins Rash     Influenza Vaccines Other (See Comments) and Nausea and Vomiting     HUT Reaction: Nausea And Vomiting; HUT Reaction Type: Intolerance; HUT Severity: Low; HUT Noted: [id number]     Lamotrigine Rash     Possibly associated with Lamictal.   HUT Comment: Possibly associated with Lamictal. ; HUT Reaction: Rash; HUT Reaction Type: Allergy; HUT Severity: Low; HUT Noted: [id number]     Latex Rash     HUT Reaction: Rash; HUT Reaction Type: Allergy; HUT Severity: Low; HUT Noted: [id number]     Family History  Family History   Problem Relation Age of Onset     Diabetes Type 2  Maternal Grandmother      Diabetes Type 2  Paternal Grandmother      Breast Cancer Paternal Grandmother      Cerebrovascular Disease Father 53     Myocardial Infarction No family hx of      Coronary Artery Disease Early Onset No family hx of      Ovarian Cancer No family hx of      Colon Cancer No family hx of      Social History   Social History     Tobacco Use     Smoking status: Never Smoker     Smokeless  "tobacco: Never Used   Substance Use Topics     Alcohol use: No     Alcohol/week: 0.0 standard drinks     Drug use: No      Past medical history, past surgical history, medications, allergies, family history, and social history were reviewed with the patient. No additional pertinent items.       Review of Systems   ROS: 14 point ROS neg other than the symptoms noted above in the HPI.    Physical Exam   BP: (!) 144/82  Pulse: 96  Temp: 98.9  F (37.2  C)  Resp: 16  Height: 157.5 cm (5' 2\")  Weight: 121.1 kg (267 lb)  SpO2: 97 %  Physical Exam  Constitutional:       General: She is not in acute distress.     Appearance: She is well-developed. She is obese. She is not diaphoretic.   HENT:      Head: Normocephalic and atraumatic.      Mouth/Throat:      Pharynx: No oropharyngeal exudate.   Eyes:      General: No scleral icterus.        Right eye: No discharge.         Left eye: No discharge.      Pupils: Pupils are equal, round, and reactive to light.   Neck:      Musculoskeletal: Normal range of motion and neck supple.   Cardiovascular:      Rate and Rhythm: Normal rate and regular rhythm.      Heart sounds: Normal heart sounds. No murmur. No friction rub. No gallop.    Pulmonary:      Effort: Pulmonary effort is normal. No respiratory distress.      Breath sounds: Normal breath sounds. No wheezing.   Chest:      Chest wall: No tenderness.   Abdominal:      General: Bowel sounds are normal. There is no distension.      Palpations: Abdomen is soft.      Tenderness: There is no abdominal tenderness.   Musculoskeletal: Normal range of motion.         General: No tenderness or deformity.   Skin:     General: Skin is warm and dry.      Coloration: Skin is not pale.      Findings: No erythema or rash.   Neurological:      General: No focal deficit present.      Mental Status: She is alert and oriented to person, place, and time.      Cranial Nerves: No cranial nerve deficit.      Sensory: No sensory deficit.      Motor: No " weakness.         ED Course      Procedures                         No results found for any visits on 09/28/20.  Medications - No data to display     Assessments & Plan (with Medical Decision Making)   This is a 29-year-old female presents after a fall.  Patient states she fell down 7 stairs.  She did strike her head.  She had a possible LOC.  She notes head and neck pain.  She also has right-sided shoulder as well as mid and lower back pain.  Exam demonstrates no acute abnormalities.  She has no neuro deficits.  Head and C-spine CT shows no acute abnormalities.  X-ray shows no acute abnormalities.  I discussed all results with patient. Will discharge home with return precautions. Discussed reasons to return to the emergency department.  Patient understands and agrees with this plan.    I have reviewed the nursing notes. I have reviewed the findings, diagnosis, plan and need for follow up with the patient.    New Prescriptions    No medications on file       Final diagnoses:   None       --  I, Hector Guevara, am serving as a trained medical scribe to document services personally performed by Dejon Rodriguez MD, based on the provider's statements to me.     IDejon MD, was physically present and have reviewed and verified the accuracy of this note documented by Hector Guevara.    Dejon Rodriguez MD  [facility], [place], EMERGENCY DEPARTMENT  9/28/2020     Dejon Rodriguez,   09/29/20 0212

## 2020-09-28 NOTE — ED TRIAGE NOTES
Pt BIBA from home after fall. Pt fell down 7 stairs. Pt reports she slipped on a shoe and fell. Denies LOC. Pt reports head, neck and R shoulder pain. A&O, VSS

## 2020-09-29 NOTE — TELEPHONE ENCOUNTER
Appointment scheduled. Understanding verbalized.    Patient stating since October she has had difficulty swallowing with vomiting. Reporting she had an esophogeal perforation in 10/2019. Reporting she is finding herself choking on solids intermittently since 10/2019. Reporting episodes are occurring up to 4 times a day. Stating it can be meat, pizza, bread. Patient is taking liquids.  Denies any difficulty breathing. Denies any current sensation of foreign body stuck now during triage. Patient is free of symptoms during triage call.     Per guidelines advised to be seen over next 2 weeks as symptoms have been chronic since 10/2019. Advised patient for any increase in symptoms to call back or be seen in the ER. Patient unsure what she will do at this time.Reviewed option to be seen in ER today if she feels symptoms have increased.     Caller verbalized understanding. Denies further questions.    Becca Calvert RN  [facility]      Reason for Disposition    Difficulty swallowing is a chronic symptom (recurrent or ongoing AND present > 4 weeks)    Additional Information    Negative: [1] Severe difficulty swallowing (e.g., drooling or spitting) AND [2] started suddenly after taking a medicine or allergic food    Negative: Wheezing, stridor, hoarseness, or difficulty breathing    Negative: [1] Swollen tongue AND [2] sudden onset    Negative: Sounds like a life-threatening emergency to the triager    Negative: [1] Symptoms of blocked esophagus (e.g., can't swallow normal secretions, drooling) AND [2] present now    Negative: Symptoms of food or bone stuck in throat or esophagus (e.g., pain in throat or chest, FB sensation, blood-tinged saliva)    Negative: [1] Severe difficulty swallowing (e.g., drooling or spitting, can't swallow water) AND [2] new onset AND [3] normal breathing    Negative: SEVERE symptoms of pill stuck in throat or esophagus (e.g., severe pain, bleeding, or inability to swallow liquids)    Negative: [1] Drinking very little AND [2]  dehydration suspected (e.g., no urine > 12 hours, very dry mouth, very lightheaded)    Negative: [1] Refuses to drink anything AND [2] for > 12 hours    Negative: Patient sounds very sick or weak to the triager    Negative: Fever > 100.5 F (38.1 C)    Negative: [1] Coughing spells AND [2] occur during eating/feedings or within 2 hours    Negative: [1] Symptoms of pill stuck in throat or esophagus (e.g., pain in throat or chest, FB sensation) AND [2] no relief after using CARE ADVICE    Negative: Weak immune system (e.g., HIV positive, cancer chemo, splenectomy, organ transplant, chronic steroids)    Negative: [1] Swallowing difficulty AND [2] cause unknown (Exception: difficulty swallowing is a chronic symptom)    Protocols used: SWALLOWING DIFFICULTY-A-

## 2020-10-08 ENCOUNTER — COMMUNICATION - HEALTHEAST (OUTPATIENT)
Dept: SCHEDULING | Facility: CLINIC | Age: 30
End: 2020-10-08

## 2020-10-11 ENCOUNTER — HOSPITAL ENCOUNTER (EMERGENCY)
Facility: CLINIC | Age: 30
Discharge: HOME | End: 2020-10-11
Attending: INTERNAL MEDICINE | Admitting: INTERNAL MEDICINE
Payer: COMMERCIAL

## 2020-10-11 ENCOUNTER — APPOINTMENT (OUTPATIENT)
Dept: GENERAL RADIOLOGY | Facility: CLINIC | Age: 30
End: 2020-10-11
Attending: INTERNAL MEDICINE
Payer: COMMERCIAL

## 2020-10-11 VITALS
SYSTOLIC BLOOD PRESSURE: 114 MMHG | TEMPERATURE: 98.4 F | RESPIRATION RATE: 17 BRPM | HEART RATE: 94 BPM | OXYGEN SATURATION: 97 % | DIASTOLIC BLOOD PRESSURE: 56 MMHG

## 2020-10-11 DIAGNOSIS — B34.9 VIRAL SYNDROME: ICD-10-CM

## 2020-10-11 LAB
ALBUMIN SERPL-MCNC: 3.5 G/DL (ref 3.4–5)
ALBUMIN UR-MCNC: 10 MG/DL
ALP SERPL-CCNC: 87 U/L (ref 40–150)
ALT SERPL W P-5'-P-CCNC: 35 U/L (ref 0–50)
ANION GAP SERPL CALCULATED.3IONS-SCNC: 7 MMOL/L (ref 3–14)
APPEARANCE UR: ABNORMAL
AST SERPL W P-5'-P-CCNC: 35 U/L (ref 0–45)
BACTERIA #/AREA URNS HPF: ABNORMAL /HPF
BASOPHILS # BLD AUTO: 0 10E9/L (ref 0–0.2)
BASOPHILS NFR BLD AUTO: 0.3 %
BILIRUB SERPL-MCNC: 0.2 MG/DL (ref 0.2–1.3)
BILIRUB UR QL STRIP: NEGATIVE
BUN SERPL-MCNC: 10 MG/DL (ref 7–30)
CALCIUM SERPL-MCNC: 9.4 MG/DL (ref 8.5–10.1)
CHLORIDE SERPL-SCNC: 107 MMOL/L (ref 94–109)
CO2 SERPL-SCNC: 26 MMOL/L (ref 20–32)
COLOR UR AUTO: YELLOW
CREAT SERPL-MCNC: 0.58 MG/DL (ref 0.52–1.04)
CRP SERPL-MCNC: 23 MG/L (ref 0–8)
DIFFERENTIAL METHOD BLD: NORMAL
EOSINOPHIL # BLD AUTO: 0.1 10E9/L (ref 0–0.7)
EOSINOPHIL NFR BLD AUTO: 2.2 %
ERYTHROCYTE [DISTWIDTH] IN BLOOD BY AUTOMATED COUNT: 13.9 % (ref 10–15)
ERYTHROCYTE [SEDIMENTATION RATE] IN BLOOD BY WESTERGREN METHOD: 49 MM/H (ref 0–20)
GFR SERPL CREATININE-BSD FRML MDRD: >90 ML/MIN/{1.73_M2}
GLUCOSE SERPL-MCNC: 117 MG/DL (ref 70–99)
GLUCOSE UR STRIP-MCNC: NEGATIVE MG/DL
HCG UR QL: NEGATIVE
HCT VFR BLD AUTO: 40.3 % (ref 35–47)
HGB BLD-MCNC: 12.7 G/DL (ref 11.7–15.7)
HGB UR QL STRIP: NEGATIVE
IMM GRANULOCYTES # BLD: 0 10E9/L (ref 0–0.4)
IMM GRANULOCYTES NFR BLD: 0.3 %
KETONES UR STRIP-MCNC: NEGATIVE MG/DL
LEUKOCYTE ESTERASE UR QL STRIP: NEGATIVE
LYMPHOCYTES # BLD AUTO: 1.6 10E9/L (ref 0.8–5.3)
LYMPHOCYTES NFR BLD AUTO: 26.3 %
MCH RBC QN AUTO: 28.3 PG (ref 26.5–33)
MCHC RBC AUTO-ENTMCNC: 31.5 G/DL (ref 31.5–36.5)
MCV RBC AUTO: 90 FL (ref 78–100)
MONOCYTES # BLD AUTO: 0.3 10E9/L (ref 0–1.3)
MONOCYTES NFR BLD AUTO: 5.7 %
MUCOUS THREADS #/AREA URNS LPF: PRESENT /LPF
NEUTROPHILS # BLD AUTO: 3.9 10E9/L (ref 1.6–8.3)
NEUTROPHILS NFR BLD AUTO: 65.2 %
NITRATE UR QL: NEGATIVE
NRBC # BLD AUTO: 0 10*3/UL
NRBC BLD AUTO-RTO: 0 /100
PH UR STRIP: 8 PH (ref 5–7)
PLATELET # BLD AUTO: 203 10E9/L (ref 150–450)
POTASSIUM SERPL-SCNC: 4.1 MMOL/L (ref 3.4–5.3)
PROT SERPL-MCNC: 8.7 G/DL (ref 6.8–8.8)
RBC # BLD AUTO: 4.48 10E12/L (ref 3.8–5.2)
RBC #/AREA URNS AUTO: <1 /HPF (ref 0–2)
SODIUM SERPL-SCNC: 139 MMOL/L (ref 133–144)
SOURCE: ABNORMAL
SP GR UR STRIP: 1.02 (ref 1–1.03)
SQUAMOUS #/AREA URNS AUTO: 4 /HPF (ref 0–1)
UROBILINOGEN UR STRIP-MCNC: NORMAL MG/DL (ref 0–2)
WBC # BLD AUTO: 5.9 10E9/L (ref 4–11)
WBC #/AREA URNS AUTO: 0 /HPF (ref 0–5)

## 2020-10-11 PROCEDURE — 80053 COMPREHEN METABOLIC PANEL: CPT | Performed by: INTERNAL MEDICINE

## 2020-10-11 PROCEDURE — 250N000013 HC RX MED GY IP 250 OP 250 PS 637: Performed by: INTERNAL MEDICINE

## 2020-10-11 PROCEDURE — 85652 RBC SED RATE AUTOMATED: CPT | Performed by: INTERNAL MEDICINE

## 2020-10-11 PROCEDURE — 81001 URINALYSIS AUTO W/SCOPE: CPT | Performed by: INTERNAL MEDICINE

## 2020-10-11 PROCEDURE — 85025 COMPLETE CBC W/AUTO DIFF WBC: CPT | Performed by: INTERNAL MEDICINE

## 2020-10-11 PROCEDURE — 71045 X-RAY EXAM CHEST 1 VIEW: CPT | Mod: 26 | Performed by: RADIOLOGY

## 2020-10-11 PROCEDURE — 99284 EMERGENCY DEPT VISIT MOD MDM: CPT | Mod: 25

## 2020-10-11 PROCEDURE — 71045 X-RAY EXAM CHEST 1 VIEW: CPT

## 2020-10-11 PROCEDURE — 81025 URINE PREGNANCY TEST: CPT | Performed by: INTERNAL MEDICINE

## 2020-10-11 PROCEDURE — 99283 EMERGENCY DEPT VISIT LOW MDM: CPT | Performed by: INTERNAL MEDICINE

## 2020-10-11 PROCEDURE — 86140 C-REACTIVE PROTEIN: CPT | Performed by: INTERNAL MEDICINE

## 2020-10-11 RX ORDER — ACETAMINOPHEN 325 MG/1
975 TABLET ORAL ONCE
Status: COMPLETED | OUTPATIENT
Start: 2020-10-11 | End: 2020-10-11

## 2020-10-11 RX ORDER — IBUPROFEN 600 MG/1
600 TABLET, FILM COATED ORAL ONCE
Status: COMPLETED | OUTPATIENT
Start: 2020-10-11 | End: 2020-10-11

## 2020-10-11 RX ADMIN — IBUPROFEN 600 MG: 600 TABLET ORAL at 12:00

## 2020-10-11 RX ADMIN — ACETAMINOPHEN 975 MG: 325 TABLET, FILM COATED ORAL at 13:33

## 2020-10-11 ASSESSMENT — ENCOUNTER SYMPTOMS
DIAPHORESIS: 1
COLOR CHANGE: 0
DIFFICULTY URINATING: 0
MYALGIAS: 1
NAUSEA: 1
NECK STIFFNESS: 0
EYE REDNESS: 0
SHORTNESS OF BREATH: 0
COUGH: 0
CONFUSION: 0
FEVER: 0
ARTHRALGIAS: 0
ABDOMINAL PAIN: 1
HEADACHES: 0
CHILLS: 1
DIARRHEA: 1

## 2020-10-11 NOTE — DISCHARGE INSTRUCTIONS
"  Viral Syndrome (Adult)  A viral illness may cause a number of symptoms such as fever. Other symptoms depend on the part of the body that the virus affects. If it settles in your nose, throat, and lungs, it may cause cough, sore throat, congestion, runny nose, headache, earache and other ear symptoms, or shortness of breath. If it settles in your stomach and intestinal tract, it may cause nausea, vomiting, cramping, and diarrhea. Sometimes it causes generalized symptoms like \"aching all over,\" feeling tired, loss of energy, or loss of appetite.  A viral illness usually lasts anywhere from several days to several weeks, but sometimes it lasts longer. In some cases, a more serious infection can look like a viral syndrome in the first few days of the illness. You may need another exam and additional tests to know the difference. Watch for the warning signs listed below for when to seek medical advice.  Home care  Follow these guidelines for taking care of yourself at home:    If symptoms are severe, rest at home for the first 2 to 3 days.    Stay away from cigarette smoke - both your smoke and the smoke from others.    You may use over-the-counter acetaminophen or ibuprofen for fever, muscle aching, and headache, unless another medicine was prescribed for this. If you have chronic liver or kidney disease or ever had a stomach ulcer or gastrointestinal bleeding, talk with your healthcare provider before using these medicines. No one who is younger than 18 and ill with a fever should take aspirin. It may cause severe disease or death.    Your appetite may be poor, so a light diet is fine. Avoid dehydration by drinking 8 to 12, 8-ounce glasses of fluids each day. This may include water; orange juice; lemonade; apple, grape, and cranberry juice; clear fruit drinks; electrolyte replacement and sports drinks; and decaffeinated teas and coffee. If you have been diagnosed with a kidney disease, ask your healthcare provider " how much and what types of fluids you should drink to prevent dehydration. If you have kidney disease, drinking too much fluid can cause it build up in the your body and be dangerous to your health.    Over-the-counter remedies won't shorten the length of the illness but may be helpful for symptoms such as cough, sore throat, nasal and sinus congestion, or diarrhea. Don't use decongestants if you have high blood pressure.  Follow-up care  Follow up with your healthcare provider if you do not improve over the next week.  Call 911  Call 911 if any of the following occur:    Convulsion    Feeling weak, dizzy, or like you are going to faint    Chest pain, or more than mild shortness of breath  When to seek medical advice  Call your healthcare provider right away if any of these occur:    Cough with lots of colored sputum (mucus) or blood in your sputum    Chest pain, shortness of breath, wheezing, or trouble breathing    Severe headache; face, neck, or ear pain    Severe, constant pain in the lower right side of your belly (abdominal)    Continued vomiting (can t keep liquids down)    Frequent diarrhea (more than 5 times a day); blood (red or black color) or mucus in diarrhea    Feeling weak, dizzy, or like you are going to faint    Extreme thirst    Fever of 100.4 F (38 C) or higher, or as directed by your healthcare provider  Date Last Reviewed: 4/1/2018 2000-2019 [employer]. [address] [place] [address]. All rights reserved. This information is not intended as a substitute for professional medical care. Always follow your healthcare professional's instructions.      Please make an appointment to follow up with Your Primary Care Provider in 3-7 days if not improving.

## 2020-10-11 NOTE — ED AVS SNAPSHOT
[facility] Emergency Department  [address] [facility] [address]  Phone: [phone]                                    Nevin Alvarado   MRN: [id number]    Department: [facility] Emergency Department   Date of Visit: 10/11/2020           After Visit Summary Signature Page    I have received my discharge instructions, and my questions have been answered. I have discussed any challenges I see with this plan with the nurse or doctor.    ..........................................................................................................................................  Patient/Patient Representative Signature      ..........................................................................................................................................  Patient Representative Print Name and Relationship to Patient    ..................................................               ................................................  Date                                   Time    ..........................................................................................................................................  Reviewed by Signature/Title    ...................................................              ..............................................  Date                                               Time          22EPIC Rev 08/18

## 2020-10-11 NOTE — ED TRIAGE NOTES
"BIBA with c/o general body aches fever and chills since yesterday (temp 96.0). Multiple loose stools, \" but not diarrhea.\" pt stated, \"I am sweating  and I feel cold.\" temp at home was 99.5. Denies any self harm.  "

## 2020-10-11 NOTE — ED PROVIDER NOTES
ED Provider Note  [facility]      History     Chief Complaint   Patient presents with     Fever     The history is provided by the patient and medical records.     Nevin Alvarado is a 29 year old female with a past medical history significant for self injurious behavior, repeated foreign body ingestion, and borderline personality disorder who presents here to the Emergency Department via EMS due to general body aches, subjective fever, and chills.      Per review of patient's chart, patient was recently admitted on 10/7 with foreign body aspiration.  Patient ingested 36 pills of benadryl and 2 mickie pins. Abdominal XR confirmed presence of 2 mickie pins and an EGD was performed and successfully removed the pins.     Today in the ED, patient reports she got hives yesterday. She states she then started to get chills, myalgias, and diaphoresis.  Patient states she felt feverish, but her temperature was 95 F.  Patient endorses nausea, abdominal pain, and loose stool.  She denies cough.  Patient does note she overdosed on benadryl earlier this week, and denies new pain.  Patient denies known sick exposure. Patient denies taking anything for her symptoms.     Past Medical History  Past Medical History:   Diagnosis Date     ADD (attention deficit disorder)      Anorexia nervosa with bulimia     history of; on Topamax     Anxiety      Borderline personality disorder (H)      Depression      H/O self-harm      History of pulmonary embolism 12/2019    Provoked. Completed 3 month course of Apixaban     Morbid obesity (H)      Neuropathy      PTSD (post-traumatic stress disorder)      Rectal foreign body - Recurrent issue, self placed      Suicide attempt (H) 2/21/2018     Swallowed foreign body - Recurrent issue, self placed      Past Surgical History:   Procedure Laterality Date     COMBINED ESOPHAGOSCOPY, GASTROSCOPY, DUODENOSCOPY (EGD), REPLACE ESOPHAGEAL STENT N/A 10/9/2019    Procedure:  Upper Endoscopy with Suture Placement;  Surgeon: Zurdo Ramirez MD;  [facility]     ESOPHAGOSCOPY, GASTROSCOPY, DUODENOSCOPY (EGD), COMBINED N/A 3/9/2017    Procedure: COMBINED ESOPHAGOSCOPY, GASTROSCOPY, DUODENOSCOPY (EGD), REMOVE FOREIGN BODY;  Surgeon: Avis Guzmán MD;  [facility]     ESOPHAGOSCOPY, GASTROSCOPY, DUODENOSCOPY (EGD), COMBINED N/A 4/20/2017    Procedure: COMBINED ESOPHAGOSCOPY, GASTROSCOPY, DUODENOSCOPY (EGD), REMOVE FOREIGN BODY;  EGD removal Foregin body;  Surgeon: Lokesh Paula MD;  [facility]     ESOPHAGOSCOPY, GASTROSCOPY, DUODENOSCOPY (EGD), COMBINED N/A 6/12/2017    Procedure: COMBINED ESOPHAGOSCOPY, GASTROSCOPY, DUODENOSCOPY (EGD);  COMBINED ESOPHAGOSCOPY, GASTROSCOPY, DUODENOSCOPY (EGD) [[id number]]attempted removal of foreign body;  Surgeon: Pamela Perez MD;  [facility]     ESOPHAGOSCOPY, GASTROSCOPY, DUODENOSCOPY (EGD), COMBINED N/A 6/9/2017    Procedure: COMBINED ESOPHAGOSCOPY, GASTROSCOPY, DUODENOSCOPY (EGD), REMOVE FOREIGN BODY;  Esophagoscopy, Gastroscopy, Duodenoscopy, Removal of Foreign Body;  Surgeon: Dejon Marsh MD;  [facility]     ESOPHAGOSCOPY, GASTROSCOPY, DUODENOSCOPY (EGD), COMBINED N/A 1/6/2018    Procedure: COMBINED ESOPHAGOSCOPY, GASTROSCOPY, DUODENOSCOPY (EGD), REMOVE FOREIGN BODY;  COMBINED ESOPHAGOSCOPY, GASTROSCOPY, DUODENOSCOPY (EGD) [by pascal net and snare with profol sedation;  Surgeon: Feliciano Emmanuel MD;  Location:  GI     ESOPHAGOSCOPY, GASTROSCOPY, DUODENOSCOPY (EGD), COMBINED N/A 3/19/2018    Procedure: COMBINED ESOPHAGOSCOPY, GASTROSCOPY, DUODENOSCOPY (EGD), REMOVE FOREIGN BODY;   Esophagodscopy, Gastroscopy, Duodenoscopy,Foreign Body Removal;  Surgeon: Brice Guzmán MD;  [facility]     ESOPHAGOSCOPY, GASTROSCOPY, DUODENOSCOPY (EGD), COMBINED N/A 4/16/2018    Procedure: COMBINED ESOPHAGOSCOPY, GASTROSCOPY, DUODENOSCOPY (EGD), REMOVE FOREIGN BODY;   Esophagogastroduodenoscopy  Foreign Body Removal X 2;  Surgeon: Royer Moise MD;  [facility]     ESOPHAGOSCOPY, GASTROSCOPY, DUODENOSCOPY (EGD), COMBINED N/A 6/1/2018    Procedure: COMBINED ESOPHAGOSCOPY, GASTROSCOPY, DUODENOSCOPY (EGD), REMOVE FOREIGN BODY;  COMBINED ESOPHAGOSCOPY, GASTROSCOPY, DUODENOSCOPY with removal of foreign body, propofol sedation by anesthesia;  Surgeon: Brice Martinez MD;  Location:  GI     ESOPHAGOSCOPY, GASTROSCOPY, DUODENOSCOPY (EGD), COMBINED N/A 7/25/2018    Procedure: COMBINED ESOPHAGOSCOPY, GASTROSCOPY, DUODENOSCOPY (EGD), REMOVE FOREIGN BODY;;  Surgeon: Candy Castelan MD;  Location:  GI     ESOPHAGOSCOPY, GASTROSCOPY, DUODENOSCOPY (EGD), COMBINED N/A 7/28/2018    Procedure: COMBINED ESOPHAGOSCOPY, GASTROSCOPY, DUODENOSCOPY (EGD), REMOVE FOREIGN BODY;  COMBINED ESOPHAGOSCOPY, GASTROSCOPY, DUODENOSCOPY (EGD), REMOVE FOREIGN BODY;  Surgeon: Brice Guzmán MD;  [facility]     ESOPHAGOSCOPY, GASTROSCOPY, DUODENOSCOPY (EGD), COMBINED N/A 7/31/2018    Procedure: COMBINED ESOPHAGOSCOPY, GASTROSCOPY, DUODENOSCOPY (EGD);  COMBINED ESOPHAGOSCOPY, GASTROSCOPY, DUODENOSCOPY (EGD) TO REMOVE FOREIGN BODY;  Surgeon: Lokesh Paula MD;  [facility]     ESOPHAGOSCOPY, GASTROSCOPY, DUODENOSCOPY (EGD), COMBINED N/A 8/4/2018    Procedure: COMBINED ESOPHAGOSCOPY, GASTROSCOPY, DUODENOSCOPY (EGD), REMOVE FOREIGN BODY;   combined esophagoscopy, gastroscopy, duodenoscopy, REMOVE FOREIGN BODY ;  Surgeon: Lokesh Paula MD;  [facility]     ESOPHAGOSCOPY, GASTROSCOPY, DUODENOSCOPY (EGD), COMBINED N/A 10/6/2019    Procedure: ESOPHAGOGASTRODUODENOSCOPY (EGD) with fireign body removal x2, esophageal stent placement with floroscopy;  Surgeon: Timoteo Espana MD;  [facility]     ESOPHAGOSCOPY, GASTROSCOPY, DUODENOSCOPY (EGD), COMBINED N/A 12/2/2019    Procedure: Esophagogastroduodenoscopy with esophageal stent removal, esophogram;  Surgeon:  Kailee Tena MD;  [facility]     ESOPHAGOSCOPY, GASTROSCOPY, DUODENOSCOPY (EGD), COMBINED N/A 12/17/2019    Procedure: ESOPHAGOGASTRODUODENOSCOPY, WITH FOREIGN BODY REMOVAL;  Surgeon: Pamela Perez MD;  [facility]     ESOPHAGOSCOPY, GASTROSCOPY, DUODENOSCOPY (EGD), COMBINED N/A 12/13/2019    Procedure: ESOPHAGOGASTRODUODENOSCOPY, WITH FOREIGN BODY REMOVAL;  Surgeon: Samia Stanton MD;  [facility]     ESOPHAGOSCOPY, GASTROSCOPY, DUODENOSCOPY (EGD), COMBINED N/A 12/28/2019    Procedure: ESOPHAGOGASTRODUODENOSCOPY (EGD) Removal of Foreign Body X 2;  Surgeon: Huy Kelley MD;  [facility]     ESOPHAGOSCOPY, GASTROSCOPY, DUODENOSCOPY (EGD), COMBINED N/A 1/5/2020    Procedure: ESOPHAGOGASTRODUOENOSCOPY WITH FOREIGN BODY REMOVAL;  Surgeon: Pamela Perez MD;  [facility]     ESOPHAGOSCOPY, GASTROSCOPY, DUODENOSCOPY (EGD), COMBINED N/A 1/3/2020    Procedure: ESOPHAGOGASTRODUODENOSCOPY (EGD) REMOVAL OF FOREIGN BODY.;  Surgeon: Pamela Perez MD;  [facility]     ESOPHAGOSCOPY, GASTROSCOPY, DUODENOSCOPY (EGD), COMBINED N/A 1/13/2020    Procedure: ESOPHAGOGASTRODUODENOSCOPY (EGD) for foreign body removal;  Surgeon: Lokesh Paula MD;  [facility]     ESOPHAGOSCOPY, GASTROSCOPY, DUODENOSCOPY (EGD), COMBINED N/A 1/18/2020    Procedure: Diagnostic ESOPHAGOGASTRODUODENOSCOPY (EGD;  Surgeon: Lokesh Paula MD;  [facility]     ESOPHAGOSCOPY, GASTROSCOPY, DUODENOSCOPY (EGD), COMBINED N/A 3/29/2020    Procedure: UPPER ENDOSCOPY WITH FOREIGN BODY REMOVAL;  Surgeon: Doug Hansen MD;  [facility]     ESOPHAGOSCOPY, GASTROSCOPY, DUODENOSCOPY (EGD), COMBINED N/A 7/11/2020    Procedure: ESOPHAGOGASTRODUODENOSCOPY (EGD); Upper Endoscopy WITH FOREIGN BODY REMOVAL;  Surgeon: Lyndsey Mendoza DO;  [facility]     ESOPHAGOSCOPY, GASTROSCOPY, DUODENOSCOPY (EGD), COMBINED N/A 7/29/2020    Procedure:  ESOPHAGOGASTRODUODENOSCOPY REMOVAL OF FOREIGN BODY;  Surgeon: Samia Stanton MD;  [facility]     ESOPHAGOSCOPY, GASTROSCOPY, DUODENOSCOPY (EGD), COMBINED N/A 8/1/2020    Procedure: ESOPHAGOGASTRODUODENOSCOPY, WITH FOREIGN BODY REMOVAL;  Surgeon: Pamela Perez MD;  [facility]     ESOPHAGOSCOPY, GASTROSCOPY, DUODENOSCOPY (EGD), COMBINED N/A 8/18/2020    Procedure: ESOPHAGOGASTRODUODENOSCOPY (EGD) for foreign body removal;  Surgeon: Pamela Perez MD;  [facility]     ESOPHAGOSCOPY, GASTROSCOPY, DUODENOSCOPY (EGD), COMBINED N/A 8/27/2020    Procedure: ESOPHAGOGASTRODUODENOSCOPY (EGD) with foreign body removal;  Surgeon: Campbell Rogers MD;  [facility]     ESOPHAGOSCOPY, GASTROSCOPY, DUODENOSCOPY (EGD), COMBINED N/A 9/18/2020    Procedure: ESOPHAGOGASTRODUODENOSCOPY (EGD) with foreign body removal;  Surgeon: Dick Gillis MD;  [facility]     EXAM UNDER ANESTHESIA ANUS N/A 1/10/2017    Procedure: EXAM UNDER ANESTHESIA ANUS;  Surgeon: Annmarie Haynes MD;  [facility]     EXAM UNDER ANESTHESIA RECTUM N/A 7/19/2018    Procedure: EXAM UNDER ANESTHESIA RECTUM;  EXAM UNDER ANESTHESIA, REMOVAL OF RECTAL FOREIGN BODY;  Surgeon: Annmarie Haynes MD;  [facility]     HC REMOVE FECAL IMPACTION OR FB W ANESTHESIA N/A 12/18/2016    Procedure: REMOVE FECAL IMPACTION/FOREIGN BODY UNDER ANESTHESIA;  Surgeon: Soham Cano MD;  [facility]: [facility]     KNEE SURGERY - removed a small tissue mass from knee Right      LAPAROSCOPIC ABLATION ENDOMETRIOSIS       LAPAROTOMY EXPLORATORY N/A 1/10/2017    Procedure: LAPAROTOMY EXPLORATORY;  Surgeon: Annmarie Haynes MD;  [facility]     LAPAROTOMY EXPLORATORY  09/11/2019    Manual manipulation of bowel to remove pill bottle in rectum     lymph nodes removed from neck; benign  age 7     MAMMOPLASTY REDUCTION Bilateral      RELEASE CARPAL TUNNEL Bilateral      SIGMOIDOSCOPY FLEXIBLE N/A  1/10/2017    Procedure: SIGMOIDOSCOPY FLEXIBLE;  Surgeon: Annmarie Haynes MD;  [facility]     SIGMOIDOSCOPY FLEXIBLE N/A 5/8/2018    Procedure: SIGMOIDOSCOPY FLEXIBLE;  flex sig with foreign body removal using snare and rattooth forcep;  Surgeon: Soham Cano MD;  Location:  GI     SIGMOIDOSCOPY FLEXIBLE N/A 2/20/2019    Procedure: Exam under anesthesia Colonoscopy with attempted  removal of rectal foreign body;  Surgeon: Estrada Chávez MD;  [facility]          albuterol (PROAIR HFA/PROVENTIL HFA/VENTOLIN HFA) 108 (90 Base) MCG/ACT inhaler       busPIRone (BUSPAR) 15 MG tablet       Cholecalciferol (VITAMIN D) 50 MCG (2000 UT) CAPS       cloNIDine (CATAPRES) 0.1 MG tablet       desvenlafaxine (PRISTIQ) 100 MG 24 hr tablet       docosanol (ABREVA) 10 % CREA cream       hydroxychloroquine (PLAQUENIL) 200 MG tablet       lurasidone (LATUDA) 60 MG TABS tablet       metFORMIN (GLUCOPHAGE-XR) 500 MG 24 hr tablet       norethindrone (MICRONOR) 0.35 MG tablet       pantoprazole (PROTONIX) 40 MG EC tablet       pregabalin (LYRICA) 50 MG capsule       Prenatal Vit-Fe Fumarate-FA (PNV PRENATAL PLUS MULTIVITAMIN) 27-1 MG TABS per tablet       valACYclovir (VALTREX) 1000 mg tablet      Allergies   Allergen Reactions     Amoxicillin-Pot Clavulanate Other (See Comments), Swelling and Rash     PN: facial swelling, left side. Also had cortisone injection the same day as she started the Augmentin.  Noted in downtime recovery of chart.    PN: facial swelling, left side. Also had cortisone injection the same day as she started the Augmentin.; HUT Comment: PN: facial swelling, left side. Also had cortisone injection the same day as she started the Augmentin.Noted in downtime recovery of chart.; HUT Reaction: Rash; HUT Reaction: Unknown; HUT Reaction Type: Allergy; HUT Severity: Med; HUT Noted: [id number]     Oseltamivir Hives     med stopped, PN: med stopped  med stopped, PN: med stopped; HUT Comment: med  stopped, PN: med stopped; HUT Reaction: Hives; HUT Reaction Type: Allergy; HUT Severity: Med; HUT Noted: [id number]     Penicillins Anaphylaxis     HUT Reaction: Anaphylaxis; HUT Reaction Type: Allergy; HUT Severity: High; HUT Noted: [id number]     Vancomycin Itching, Swelling and Rash     Other reaction(s): Redness  Flushed face, very itchy; HUT Comment: Flushed face, very itchy; HUT Reaction: Angioedema; HUT Reaction: Redness; HUT Severity: Med; HUT Noted: [id number]    facial     Hydrocodone Nausea and Vomiting and GI Disturbance     vomiting for days, PN: vomiting for days; HUT Comment: vomiting for days; HUT Reaction: Gastrointestinal; HUT Reaction: Nausea And Vomiting; HUT Reaction Type: Intolerance; HUT Severity: Med; HUT Noted: [id number]  vomiting for days       Blood-Group Specific Substance Other (See Comments)     Patient has an anti-Cw and non-specific antibodies. Blood product orders may be delayed. Draw one red top and two purple top tubes for all type/screen/crossmatch orders.  Patient has anti-Cw, anti-K ([place]), Warm auto and nonspecific antibodies. Blood products may be delayed. Draw patient 24 hours prior to transfusion. Draw one red top and two purple top tubes for all type and screen orders.     Cephalosporins Rash     Influenza Vaccines Other (See Comments) and Nausea and Vomiting     HUT Reaction: Nausea And Vomiting; HUT Reaction Type: Intolerance; HUT Severity: Low; HUT Noted: [id number]     Lamotrigine Rash     Possibly associated with Lamictal.   HUT Comment: Possibly associated with Lamictal. ; HUT Reaction: Rash; HUT Reaction Type: Allergy; HUT Severity: Low; HUT Noted: [id number]     Latex Rash     HUT Reaction: Rash; HUT Reaction Type: Allergy; HUT Severity: Low; HUT Noted: [id number]     Family History  Family History   Problem Relation Age of Onset     Diabetes Type 2  Maternal Grandmother      Diabetes Type 2  Paternal Grandmother      Breast Cancer Paternal Grandmother      Cerebrovascular  Disease Father 53     Myocardial Infarction No family hx of      Coronary Artery Disease Early Onset No family hx of      Ovarian Cancer No family hx of      Colon Cancer No family hx of      Social History   Social History     Tobacco Use     Smoking status: Never Smoker     Smokeless tobacco: Never Used   Substance Use Topics     Alcohol use: No     Alcohol/week: 0.0 standard drinks     Drug use: No      Past medical history, past surgical history, medications, allergies, family history, and social history were reviewed with the patient. No additional pertinent items.       Review of Systems   Constitutional: Positive for chills and diaphoresis. Negative for fever (subjective).   HENT: Negative for congestion.    Eyes: Negative for redness.   Respiratory: Negative for cough and shortness of breath.    Cardiovascular: Negative for chest pain.   Gastrointestinal: Positive for abdominal pain, diarrhea and nausea.   Genitourinary: Negative for difficulty urinating.   Musculoskeletal: Positive for myalgias. Negative for arthralgias and neck stiffness.   Skin: Negative for color change.   Neurological: Negative for headaches.   Psychiatric/Behavioral: Negative for confusion.     A complete review of systems was performed with pertinent positives and negatives noted in the HPI, and all other systems negative.    Physical Exam   BP: (!) 139/112  Pulse: 97  Temp: 98.4  F (36.9  C)  Resp: 16  SpO2: 100 %  Physical Exam  Constitutional:       General: She is not in acute distress.     Appearance: She is not diaphoretic.   HENT:      Head: Atraumatic.      Mouth/Throat:      Pharynx: No oropharyngeal exudate.   Eyes:      General: No scleral icterus.     Pupils: Pupils are equal, round, and reactive to light.   Neck:      Musculoskeletal: Neck supple.   Cardiovascular:      Rate and Rhythm: Normal rate and regular rhythm.      Heart sounds: Normal heart sounds. No murmur. No friction rub. No gallop.    Pulmonary:      Effort:  Pulmonary effort is normal. No respiratory distress.      Breath sounds: Normal breath sounds. No stridor. No wheezing, rhonchi or rales.   Chest:      Chest wall: No tenderness.   Abdominal:      General: Abdomen is flat. Bowel sounds are normal. There is no distension.      Palpations: Abdomen is soft. There is no mass.      Tenderness: There is no abdominal tenderness. There is no right CVA tenderness, left CVA tenderness, guarding or rebound.      Hernia: No hernia is present.   Musculoskeletal:         General: No tenderness.   Skin:     General: Skin is warm.      Findings: No rash.   Neurological:      General: No focal deficit present.      Cranial Nerves: No cranial nerve deficit.      Sensory: No sensory deficit.      Motor: No weakness.      Coordination: Coordination normal.      Gait: Gait normal.      Deep Tendon Reflexes: Reflexes normal.         ED Course     11:52 AM  The patient was seen and examined by Mila Diallo MD in Room ED09.    Procedures             Results for orders placed or performed during the hospital encounter of 10/11/20   XR Chest Port 1 View     Status: None    Narrative    Exam: XR CHEST PORT 1 VW, 10/11/2020 12:33 PM    Indication: fever    Comparison: Chest radiograph 9/18/2020    Findings:   Lines: Right chest port while catheter with the tip at the level of  the upper right atrium.    AP view of the chest. Images are mildly underpenetrated. No new focal  airspace consolidation, pleural effusion or pneumothorax. Cardiac  silhouette is within normal limits. Degenerative changes of the spine.  Mild thoracic levocurvature.      Impression    Impression:   Thoracic levocurvature, otherwise negative.    I have personally reviewed the examination and initial interpretation  and I agree with the findings.    LEXII CHAVIRA MD   CBC with platelets differential     Status: None   Result Value Ref Range    WBC 5.9 4.0 - 11.0 10e9/L    RBC Count 4.48 3.8 - 5.2 10e12/L     Hemoglobin 12.7 11.7 - 15.7 g/dL    Hematocrit 40.3 35.0 - 47.0 %    MCV 90 78 - 100 fl    MCH 28.3 26.5 - 33.0 pg    MCHC 31.5 31.5 - 36.5 g/dL    RDW 13.9 10.0 - 15.0 %    Platelet Count 203 150 - 450 10e9/L    Diff Method Automated Method     % Neutrophils 65.2 %    % Lymphocytes 26.3 %    % Monocytes 5.7 %    % Eosinophils 2.2 %    % Basophils 0.3 %    % Immature Granulocytes 0.3 %    Nucleated RBCs 0 0 /100    Absolute Neutrophil 3.9 1.6 - 8.3 10e9/L    Absolute Lymphocytes 1.6 0.8 - 5.3 10e9/L    Absolute Monocytes 0.3 0.0 - 1.3 10e9/L    Absolute Eosinophils 0.1 0.0 - 0.7 10e9/L    Absolute Basophils 0.0 0.0 - 0.2 10e9/L    Abs Immature Granulocytes 0.0 0 - 0.4 10e9/L    Absolute Nucleated RBC 0.0    Comprehensive metabolic panel     Status: Abnormal   Result Value Ref Range    Sodium 139 133 - 144 mmol/L    Potassium 4.1 3.4 - 5.3 mmol/L    Chloride 107 94 - 109 mmol/L    Carbon Dioxide 26 20 - 32 mmol/L    Anion Gap 7 3 - 14 mmol/L    Glucose 117 (H) 70 - 99 mg/dL    Urea Nitrogen 10 7 - 30 mg/dL    Creatinine 0.58 0.52 - 1.04 mg/dL    GFR Estimate >90 >60 mL/min/[1.73_m2]    GFR Estimate If Black >90 >60 mL/min/[1.73_m2]    Calcium 9.4 8.5 - 10.1 mg/dL    Bilirubin Total 0.2 0.2 - 1.3 mg/dL    Albumin 3.5 3.4 - 5.0 g/dL    Protein Total 8.7 6.8 - 8.8 g/dL    Alkaline Phosphatase 87 40 - 150 U/L    ALT 35 0 - 50 U/L    AST 35 0 - 45 U/L   CRP inflammation     Status: Abnormal   Result Value Ref Range    CRP Inflammation 23.0 (H) 0.0 - 8.0 mg/L   Erythrocyte sedimentation rate auto     Status: Abnormal   Result Value Ref Range    Sed Rate 49 (H) 0 - 20 mm/h   UA reflex to Microscopic and Culture     Status: Abnormal    Specimen: Urine clean catch; Midstream Urine   Result Value Ref Range    Color Urine Yellow     Appearance Urine Slightly Cloudy     Glucose Urine Negative NEG^Negative mg/dL    Bilirubin Urine Negative NEG^Negative    Ketones Urine Negative NEG^Negative mg/dL    Specific Gravity Urine 1.018  1.003 - 1.035    Blood Urine Negative NEG^Negative    pH Urine 8.0 (H) 5.0 - 7.0 pH    Protein Albumin Urine 10 (A) NEG^Negative mg/dL    Urobilinogen mg/dL Normal 0.0 - 2.0 mg/dL    Nitrite Urine Negative NEG^Negative    Leukocyte Esterase Urine Negative NEG^Negative    Source Midstream Urine     RBC Urine <1 0 - 2 /HPF    WBC Urine 0 0 - 5 /HPF    Bacteria Urine Few (A) NEG^Negative /HPF    Squamous Epithelial /HPF Urine 4 (H) 0 - 1 /HPF    Mucous Urine Present (A) NEG^Negative /LPF   HCG qualitative urine     Status: None   Result Value Ref Range    HCG Qual Urine Negative NEG^Negative     Medications   ibuprofen (ADVIL/MOTRIN) tablet 600 mg (600 mg Oral Given 10/11/20 1200)   acetaminophen (TYLENOL) tablet 975 mg (975 mg Oral Given 10/11/20 1333)        Assessments & Plan (with Medical Decision Making)  Low grade fever and chills, diffuse aches, no respiratory symptoms but some GI mild symptoms, labs mild elevation of inflammatory markers, UA UPT  Neg, CXR neg, COVID neg 4 days ago, improved after ibuprofen and tylenol, etiology not clear but probable viral, discharge and follow up with her PMD.       I have reviewed the nursing notes. I have reviewed the findings, diagnosis, plan and need for follow up with the patient.    Discharge Medication List as of 10/11/2020  3:06 PM          Final diagnoses:   Viral syndrome   IShantel, am serving as a trained medical scribe to document services personally performed by Mila Diallo Md, MD, based on the provider's statements to me.     IMila Md, MD, was physically present and have reviewed and verified the accuracy of this note documented by Shantel Vinsno.     --  Mila Diallo MD  [facility] EMERGENCY DEPARTMENT  10/11/2020     Mila Diallo MD  10/11/20 0321

## 2020-10-14 NOTE — ED TRIAGE NOTES
"Triage Assessment & Note:    /72   Pulse 96   Temp 97.6  F (36.4  C) (Oral)   Resp 12   Ht 1.575 m (5' 2\")   Wt 118.4 kg (261 lb)   SpO2 99%   BMI 47.74 kg/m        Patient presents with: Pt BIBA from group home with abdominal pain and some back pain. No reports of fever, cough, CP, or SOB    Home Treatments/Remedies: Home medications    Febrile / Afebrile: afebrile    Duration of C/o: < 24 hours    Flower Johnson RN  October 26, 2019        " JALEN resolved with increased frequency of Straight Catheterization; patient now with -indwelling scott catheter   Continue to Monitor BMP RESOLVED  JALEN resolved with increased frequency of Straight Catheterization; patient now with -indwelling scott catheter   Continue to Monitor BMP

## 2020-10-30 ENCOUNTER — APPOINTMENT (OUTPATIENT)
Dept: ULTRASOUND IMAGING | Facility: CLINIC | Age: 30
End: 2020-10-30
Attending: INTERNAL MEDICINE
Payer: COMMERCIAL

## 2020-10-30 ENCOUNTER — HOSPITAL ENCOUNTER (OUTPATIENT)
Facility: CLINIC | Age: 30
Setting detail: OBSERVATION
Discharge: HOME | End: 2020-10-31
Attending: INTERNAL MEDICINE | Admitting: INTERNAL MEDICINE
Payer: COMMERCIAL

## 2020-10-30 ENCOUNTER — APPOINTMENT (OUTPATIENT)
Dept: GENERAL RADIOLOGY | Facility: CLINIC | Age: 30
End: 2020-10-30
Attending: INTERNAL MEDICINE
Payer: COMMERCIAL

## 2020-10-30 DIAGNOSIS — K80.20 GALLSTONES: ICD-10-CM

## 2020-10-30 DIAGNOSIS — R45.851 SUICIDAL IDEATION: ICD-10-CM

## 2020-10-30 DIAGNOSIS — Z20.828 EXPOSURE TO SARS-ASSOCIATED CORONAVIRUS: ICD-10-CM

## 2020-10-30 DIAGNOSIS — R10.11 RUQ ABDOMINAL PAIN: ICD-10-CM

## 2020-10-30 DIAGNOSIS — R11.2 NAUSEA AND VOMITING, INTRACTABILITY OF VOMITING NOT SPECIFIED, UNSPECIFIED VOMITING TYPE: ICD-10-CM

## 2020-10-30 LAB
ALBUMIN SERPL-MCNC: 3.4 G/DL (ref 3.4–5)
ALP SERPL-CCNC: 80 U/L (ref 40–150)
ALT SERPL W P-5'-P-CCNC: 40 U/L (ref 0–50)
ANION GAP SERPL CALCULATED.3IONS-SCNC: 6 MMOL/L (ref 3–14)
AST SERPL W P-5'-P-CCNC: 22 U/L (ref 0–45)
BASOPHILS # BLD AUTO: 0 10E9/L (ref 0–0.2)
BASOPHILS NFR BLD AUTO: 0.4 %
BILIRUB SERPL-MCNC: 0.2 MG/DL (ref 0.2–1.3)
BUN SERPL-MCNC: 7 MG/DL (ref 7–30)
CALCIUM SERPL-MCNC: 9.4 MG/DL (ref 8.5–10.1)
CHLORIDE SERPL-SCNC: 105 MMOL/L (ref 94–109)
CO2 SERPL-SCNC: 27 MMOL/L (ref 20–32)
CREAT SERPL-MCNC: 0.56 MG/DL (ref 0.52–1.04)
CRP SERPL-MCNC: 27 MG/L (ref 0–8)
DIFFERENTIAL METHOD BLD: ABNORMAL
EOSINOPHIL # BLD AUTO: 0.1 10E9/L (ref 0–0.7)
EOSINOPHIL NFR BLD AUTO: 1 %
ERYTHROCYTE [DISTWIDTH] IN BLOOD BY AUTOMATED COUNT: 13.6 % (ref 10–15)
GFR SERPL CREATININE-BSD FRML MDRD: >90 ML/MIN/{1.73_M2}
GLUCOSE SERPL-MCNC: 100 MG/DL (ref 70–99)
HCT VFR BLD AUTO: 34.9 % (ref 35–47)
HGB BLD-MCNC: 11 G/DL (ref 11.7–15.7)
IMM GRANULOCYTES # BLD: 0 10E9/L (ref 0–0.4)
IMM GRANULOCYTES NFR BLD: 0.3 %
INR PPP: 1.06 (ref 0.86–1.14)
LACTATE BLD-SCNC: 1.2 MMOL/L (ref 0.7–2)
LIPASE SERPL-CCNC: 75 U/L (ref 73–393)
LYMPHOCYTES # BLD AUTO: 1.8 10E9/L (ref 0.8–5.3)
LYMPHOCYTES NFR BLD AUTO: 18.7 %
MCH RBC QN AUTO: 27.2 PG (ref 26.5–33)
MCHC RBC AUTO-ENTMCNC: 31.5 G/DL (ref 31.5–36.5)
MCV RBC AUTO: 86 FL (ref 78–100)
MONOCYTES # BLD AUTO: 0.6 10E9/L (ref 0–1.3)
MONOCYTES NFR BLD AUTO: 6.1 %
NEUTROPHILS # BLD AUTO: 7.1 10E9/L (ref 1.6–8.3)
NEUTROPHILS NFR BLD AUTO: 73.5 %
NRBC # BLD AUTO: 0 10*3/UL
NRBC BLD AUTO-RTO: 0 /100
PLATELET # BLD AUTO: 293 10E9/L (ref 150–450)
POTASSIUM SERPL-SCNC: 3.7 MMOL/L (ref 3.4–5.3)
PROT SERPL-MCNC: 7.5 G/DL (ref 6.8–8.8)
RBC # BLD AUTO: 4.04 10E12/L (ref 3.8–5.2)
SODIUM SERPL-SCNC: 138 MMOL/L (ref 133–144)
WBC # BLD AUTO: 9.7 10E9/L (ref 4–11)

## 2020-10-30 PROCEDURE — 76705 ECHO EXAM OF ABDOMEN: CPT | Mod: 26 | Performed by: RADIOLOGY

## 2020-10-30 PROCEDURE — 80053 COMPREHEN METABOLIC PANEL: CPT | Performed by: INTERNAL MEDICINE

## 2020-10-30 PROCEDURE — G0378 HOSPITAL OBSERVATION PER HR: HCPCS

## 2020-10-30 PROCEDURE — 83605 ASSAY OF LACTIC ACID: CPT | Performed by: INTERNAL MEDICINE

## 2020-10-30 PROCEDURE — C9113 INJ PANTOPRAZOLE SODIUM, VIA: HCPCS | Performed by: INTERNAL MEDICINE

## 2020-10-30 PROCEDURE — 74019 RADEX ABDOMEN 2 VIEWS: CPT

## 2020-10-30 PROCEDURE — 99285 EMERGENCY DEPT VISIT HI MDM: CPT | Performed by: INTERNAL MEDICINE

## 2020-10-30 PROCEDURE — 258N000003 HC RX IP 258 OP 636: Performed by: INTERNAL MEDICINE

## 2020-10-30 PROCEDURE — 250N000011 HC RX IP 250 OP 636: Performed by: INTERNAL MEDICINE

## 2020-10-30 PROCEDURE — 86140 C-REACTIVE PROTEIN: CPT | Performed by: INTERNAL MEDICINE

## 2020-10-30 PROCEDURE — 85610 PROTHROMBIN TIME: CPT | Performed by: INTERNAL MEDICINE

## 2020-10-30 PROCEDURE — 96361 HYDRATE IV INFUSION ADD-ON: CPT | Performed by: INTERNAL MEDICINE

## 2020-10-30 PROCEDURE — 76705 ECHO EXAM OF ABDOMEN: CPT

## 2020-10-30 PROCEDURE — 96375 TX/PRO/DX INJ NEW DRUG ADDON: CPT | Mod: 59 | Performed by: INTERNAL MEDICINE

## 2020-10-30 PROCEDURE — 85025 COMPLETE CBC W/AUTO DIFF WBC: CPT | Performed by: INTERNAL MEDICINE

## 2020-10-30 PROCEDURE — 96374 THER/PROPH/DIAG INJ IV PUSH: CPT | Performed by: INTERNAL MEDICINE

## 2020-10-30 PROCEDURE — 999N000127 HC STATISTIC PERIPHERAL IV START W US GUIDANCE

## 2020-10-30 PROCEDURE — 99285 EMERGENCY DEPT VISIT HI MDM: CPT | Mod: 25 | Performed by: INTERNAL MEDICINE

## 2020-10-30 PROCEDURE — 96376 TX/PRO/DX INJ SAME DRUG ADON: CPT | Mod: 59 | Performed by: INTERNAL MEDICINE

## 2020-10-30 PROCEDURE — 74019 RADEX ABDOMEN 2 VIEWS: CPT | Mod: 26 | Performed by: RADIOLOGY

## 2020-10-30 PROCEDURE — 83690 ASSAY OF LIPASE: CPT | Performed by: INTERNAL MEDICINE

## 2020-10-30 RX ORDER — ONDANSETRON 2 MG/ML
4 INJECTION INTRAMUSCULAR; INTRAVENOUS ONCE
Status: COMPLETED | OUTPATIENT
Start: 2020-10-30 | End: 2020-10-30

## 2020-10-30 RX ORDER — HYDROMORPHONE HYDROCHLORIDE 1 MG/ML
0.5 INJECTION, SOLUTION INTRAMUSCULAR; INTRAVENOUS; SUBCUTANEOUS ONCE
Status: COMPLETED | OUTPATIENT
Start: 2020-10-30 | End: 2020-10-30

## 2020-10-30 RX ORDER — HYDROMORPHONE HYDROCHLORIDE 1 MG/ML
0.5 INJECTION, SOLUTION INTRAMUSCULAR; INTRAVENOUS; SUBCUTANEOUS
Status: DISCONTINUED | OUTPATIENT
Start: 2020-10-30 | End: 2020-10-31 | Stop reason: HOSPADM

## 2020-10-30 RX ORDER — SODIUM CHLORIDE 9 MG/ML
INJECTION, SOLUTION INTRAVENOUS CONTINUOUS
Status: DISCONTINUED | OUTPATIENT
Start: 2020-10-30 | End: 2020-10-31 | Stop reason: HOSPADM

## 2020-10-30 RX ADMIN — HYDROMORPHONE HYDROCHLORIDE 0.5 MG: 1 INJECTION, SOLUTION INTRAMUSCULAR; INTRAVENOUS; SUBCUTANEOUS at 20:08

## 2020-10-30 RX ADMIN — ONDANSETRON 4 MG: 2 INJECTION INTRAMUSCULAR; INTRAVENOUS at 20:07

## 2020-10-30 RX ADMIN — HYDROMORPHONE HYDROCHLORIDE 0.5 MG: 1 INJECTION, SOLUTION INTRAMUSCULAR; INTRAVENOUS; SUBCUTANEOUS at 23:17

## 2020-10-30 RX ADMIN — SODIUM CHLORIDE 1000 ML: 9 INJECTION, SOLUTION INTRAVENOUS at 20:06

## 2020-10-30 RX ADMIN — HYDROMORPHONE HYDROCHLORIDE 0.5 MG: 1 INJECTION, SOLUTION INTRAMUSCULAR; INTRAVENOUS; SUBCUTANEOUS at 21:24

## 2020-10-30 RX ADMIN — PANTOPRAZOLE SODIUM 40 MG: 40 INJECTION, POWDER, FOR SOLUTION INTRAVENOUS at 23:18

## 2020-10-30 ASSESSMENT — ENCOUNTER SYMPTOMS
SHORTNESS OF BREATH: 0
FEVER: 0
DIARRHEA: 0
ABDOMINAL PAIN: 1
HEADACHES: 0
COUGH: 0
CHILLS: 0
VOMITING: 1
NECK PAIN: 0
NUMBNESS: 0
WHEEZING: 0
PALPITATIONS: 0
NAUSEA: 1
ADENOPATHY: 0
WEAKNESS: 0
BACK PAIN: 0
LIGHT-HEADEDNESS: 0
CONFUSION: 0
DYSURIA: 0
TROUBLE SWALLOWING: 0

## 2020-10-30 ASSESSMENT — MIFFLIN-ST. JEOR: SCORE: 1898.89

## 2020-10-30 NOTE — ED AVS SNAPSHOT
[facility]  [address] [facility] [facility] [address]  Phone: [phone]                                    Nevin Alvarado   MRN: [id number]    Department: [facility]   Date of Visit: 10/30/2020           After Visit Summary Signature Page    I have received my discharge instructions, and my questions have been answered. I have discussed any challenges I see with this plan with the nurse or doctor.    ..........................................................................................................................................  Patient/Patient Representative Signature      ..........................................................................................................................................  Patient Representative Print Name and Relationship to Patient    ..................................................               ................................................  Date                                   Time    ..........................................................................................................................................  Reviewed by Signature/Title    ...................................................              ..............................................  Date                                               Time          22EPIC Rev 08/18

## 2020-10-30 NOTE — ED TRIAGE NOTES
Pt arrives ambulatory to triage with c/o abdominal pain and nausea. Pt reports having been diagnosed with gallstones yesterday. A+O, VSS

## 2020-10-30 NOTE — ED PROVIDER NOTES
ED Provider Note  [facility]      History     Chief Complaint   Patient presents with     Abdominal Pain     The history is provided by the patient and medical records.     Nevin Alvarado is a 29 year old female with a past medical history significant for pulmonary embolus, BPD, ADD, depression, PTSD, esophageal perforation status post esophageal stent (10/9/2019) and foreign body ingestion who presents the ED for evaluation of abdominal pain.  The patient states that she has been diagnosed with gallstones as of yesterday by a doctor at [facility] but there is no record of this in the medical record.  The patient complains of abdominal pain and nausea. She vomited once today. She states she has been having white stools. She denies fever, chills, URI symptoms, cough, sputum, shortness of breath. She has no diarrhea. Her abdominal pain is epigastric and RUQ. Last ingestion was earlier this months.    Past Medical History:   Diagnosis Date     ADD (attention deficit disorder)      Anorexia nervosa with bulimia     history of; on Topamax     Anxiety      Borderline personality disorder (H)      Depression      H/O self-harm      History of pulmonary embolism 12/2019    Provoked. Completed 3 month course of Apixaban     Morbid obesity (H)      Neuropathy      PTSD (post-traumatic stress disorder)      Rectal foreign body - Recurrent issue, self placed      Suicide attempt (H) 2/21/2018     Swallowed foreign body - Recurrent issue, self placed        Past Surgical History:   Procedure Laterality Date     COMBINED ESOPHAGOSCOPY, GASTROSCOPY, DUODENOSCOPY (EGD), REPLACE ESOPHAGEAL STENT N/A 10/9/2019    Procedure: Upper Endoscopy with Suture Placement;  Surgeon: Zurdo Ramirez MD;  [facility]     ESOPHAGOSCOPY, GASTROSCOPY, DUODENOSCOPY (EGD), COMBINED N/A 3/9/2017    Procedure: COMBINED ESOPHAGOSCOPY, GASTROSCOPY, DUODENOSCOPY (EGD), REMOVE FOREIGN BODY;  Surgeon:  Avis Guzmán MD;  [facility]     ESOPHAGOSCOPY, GASTROSCOPY, DUODENOSCOPY (EGD), COMBINED N/A 4/20/2017    Procedure: COMBINED ESOPHAGOSCOPY, GASTROSCOPY, DUODENOSCOPY (EGD), REMOVE FOREIGN BODY;  EGD removal Foregin body;  Surgeon: Lokesh Paula MD;  [facility]     ESOPHAGOSCOPY, GASTROSCOPY, DUODENOSCOPY (EGD), COMBINED N/A 6/12/2017    Procedure: COMBINED ESOPHAGOSCOPY, GASTROSCOPY, DUODENOSCOPY (EGD);  COMBINED ESOPHAGOSCOPY, GASTROSCOPY, DUODENOSCOPY (EGD) [[id number]]attempted removal of foreign body;  Surgeon: Pamela Perez MD;  [facility]     ESOPHAGOSCOPY, GASTROSCOPY, DUODENOSCOPY (EGD), COMBINED N/A 6/9/2017    Procedure: COMBINED ESOPHAGOSCOPY, GASTROSCOPY, DUODENOSCOPY (EGD), REMOVE FOREIGN BODY;  Esophagoscopy, Gastroscopy, Duodenoscopy, Removal of Foreign Body;  Surgeon: Dejon Marsh MD;  [facility]     ESOPHAGOSCOPY, GASTROSCOPY, DUODENOSCOPY (EGD), COMBINED N/A 1/6/2018    Procedure: COMBINED ESOPHAGOSCOPY, GASTROSCOPY, DUODENOSCOPY (EGD), REMOVE FOREIGN BODY;  COMBINED ESOPHAGOSCOPY, GASTROSCOPY, DUODENOSCOPY (EGD) [by pascal net and snare with profol sedation;  Surgeon: Feliciano Emmanuel MD;  Location:  GI     ESOPHAGOSCOPY, GASTROSCOPY, DUODENOSCOPY (EGD), COMBINED N/A 3/19/2018    Procedure: COMBINED ESOPHAGOSCOPY, GASTROSCOPY, DUODENOSCOPY (EGD), REMOVE FOREIGN BODY;   Esophagodscopy, Gastroscopy, Duodenoscopy,Foreign Body Removal;  Surgeon: Brice Guzmán MD;  [facility]     ESOPHAGOSCOPY, GASTROSCOPY, DUODENOSCOPY (EGD), COMBINED N/A 4/16/2018    Procedure: COMBINED ESOPHAGOSCOPY, GASTROSCOPY, DUODENOSCOPY (EGD), REMOVE FOREIGN BODY;  Esophagogastroduodenoscopy  Foreign Body Removal X 2;  Surgeon: Royer Moise MD;  [facility]     ESOPHAGOSCOPY, GASTROSCOPY, DUODENOSCOPY (EGD), COMBINED N/A 6/1/2018    Procedure: COMBINED ESOPHAGOSCOPY, GASTROSCOPY, DUODENOSCOPY (EGD), REMOVE FOREIGN BODY;   COMBINED ESOPHAGOSCOPY, GASTROSCOPY, DUODENOSCOPY with removal of foreign body, propofol sedation by anesthesia;  Surgeon: Brice Martinez MD;  Location:  GI     ESOPHAGOSCOPY, GASTROSCOPY, DUODENOSCOPY (EGD), COMBINED N/A 7/25/2018    Procedure: COMBINED ESOPHAGOSCOPY, GASTROSCOPY, DUODENOSCOPY (EGD), REMOVE FOREIGN BODY;;  Surgeon: Candy Castelan MD;  Location:  GI     ESOPHAGOSCOPY, GASTROSCOPY, DUODENOSCOPY (EGD), COMBINED N/A 7/28/2018    Procedure: COMBINED ESOPHAGOSCOPY, GASTROSCOPY, DUODENOSCOPY (EGD), REMOVE FOREIGN BODY;  COMBINED ESOPHAGOSCOPY, GASTROSCOPY, DUODENOSCOPY (EGD), REMOVE FOREIGN BODY;  Surgeon: Brice Guzmán MD;  [facility]     ESOPHAGOSCOPY, GASTROSCOPY, DUODENOSCOPY (EGD), COMBINED N/A 7/31/2018    Procedure: COMBINED ESOPHAGOSCOPY, GASTROSCOPY, DUODENOSCOPY (EGD);  COMBINED ESOPHAGOSCOPY, GASTROSCOPY, DUODENOSCOPY (EGD) TO REMOVE FOREIGN BODY;  Surgeon: Lokesh Paula MD;  [facility]     ESOPHAGOSCOPY, GASTROSCOPY, DUODENOSCOPY (EGD), COMBINED N/A 8/4/2018    Procedure: COMBINED ESOPHAGOSCOPY, GASTROSCOPY, DUODENOSCOPY (EGD), REMOVE FOREIGN BODY;   combined esophagoscopy, gastroscopy, duodenoscopy, REMOVE FOREIGN BODY ;  Surgeon: Lokesh Paula MD;  [facility]     ESOPHAGOSCOPY, GASTROSCOPY, DUODENOSCOPY (EGD), COMBINED N/A 10/6/2019    Procedure: ESOPHAGOGASTRODUODENOSCOPY (EGD) with fireign body removal x2, esophageal stent placement with floroscopy;  Surgeon: Timoteo Espana MD;  [facility]     ESOPHAGOSCOPY, GASTROSCOPY, DUODENOSCOPY (EGD), COMBINED N/A 12/2/2019    Procedure: Esophagogastroduodenoscopy with esophageal stent removal, esophogram;  Surgeon: Kailee Tena MD;  [facility]     ESOPHAGOSCOPY, GASTROSCOPY, DUODENOSCOPY (EGD), COMBINED N/A 12/17/2019    Procedure: ESOPHAGOGASTRODUODENOSCOPY, WITH FOREIGN BODY REMOVAL;  Surgeon: Pamela Perez MD;  [facility]     ESOPHAGOSCOPY,  GASTROSCOPY, DUODENOSCOPY (EGD), COMBINED N/A 12/13/2019    Procedure: ESOPHAGOGASTRODUODENOSCOPY, WITH FOREIGN BODY REMOVAL;  Surgeon: Samia Stanton MD;  [facility]     ESOPHAGOSCOPY, GASTROSCOPY, DUODENOSCOPY (EGD), COMBINED N/A 12/28/2019    Procedure: ESOPHAGOGASTRODUODENOSCOPY (EGD) Removal of Foreign Body X 2;  Surgeon: Huy Kelley MD;  [facility]     ESOPHAGOSCOPY, GASTROSCOPY, DUODENOSCOPY (EGD), COMBINED N/A 1/5/2020    Procedure: ESOPHAGOGASTRODUOENOSCOPY WITH FOREIGN BODY REMOVAL;  Surgeon: Pamela Perez MD;  [facility]     ESOPHAGOSCOPY, GASTROSCOPY, DUODENOSCOPY (EGD), COMBINED N/A 1/3/2020    Procedure: ESOPHAGOGASTRODUODENOSCOPY (EGD) REMOVAL OF FOREIGN BODY.;  Surgeon: Pamela Perez MD;  [facility]     ESOPHAGOSCOPY, GASTROSCOPY, DUODENOSCOPY (EGD), COMBINED N/A 1/13/2020    Procedure: ESOPHAGOGASTRODUODENOSCOPY (EGD) for foreign body removal;  Surgeon: Lokesh Paula MD;  [facility]     ESOPHAGOSCOPY, GASTROSCOPY, DUODENOSCOPY (EGD), COMBINED N/A 1/18/2020    Procedure: Diagnostic ESOPHAGOGASTRODUODENOSCOPY (EGD;  Surgeon: Lokesh Paula MD;  [facility]     ESOPHAGOSCOPY, GASTROSCOPY, DUODENOSCOPY (EGD), COMBINED N/A 3/29/2020    Procedure: UPPER ENDOSCOPY WITH FOREIGN BODY REMOVAL;  Surgeon: Doug Hansen MD;  [facility]     ESOPHAGOSCOPY, GASTROSCOPY, DUODENOSCOPY (EGD), COMBINED N/A 7/11/2020    Procedure: ESOPHAGOGASTRODUODENOSCOPY (EGD); Upper Endoscopy WITH FOREIGN BODY REMOVAL;  Surgeon: Lyndsey Mendoza DO;  [facility]     ESOPHAGOSCOPY, GASTROSCOPY, DUODENOSCOPY (EGD), COMBINED N/A 7/29/2020    Procedure: ESOPHAGOGASTRODUODENOSCOPY REMOVAL OF FOREIGN BODY;  Surgeon: Samia Stanton MD;  [facility]     ESOPHAGOSCOPY, GASTROSCOPY, DUODENOSCOPY (EGD), COMBINED N/A 8/1/2020    Procedure: ESOPHAGOGASTRODUODENOSCOPY, WITH FOREIGN BODY REMOVAL;  Surgeon: Pamela Perez MD;   [facility]     ESOPHAGOSCOPY, GASTROSCOPY, DUODENOSCOPY (EGD), COMBINED N/A 8/18/2020    Procedure: ESOPHAGOGASTRODUODENOSCOPY (EGD) for foreign body removal;  Surgeon: Pamela Perez MD;  [facility]     ESOPHAGOSCOPY, GASTROSCOPY, DUODENOSCOPY (EGD), COMBINED N/A 8/27/2020    Procedure: ESOPHAGOGASTRODUODENOSCOPY (EGD) with foreign body removal;  Surgeon: Campbell Rogers MD;  [facility]     ESOPHAGOSCOPY, GASTROSCOPY, DUODENOSCOPY (EGD), COMBINED N/A 9/18/2020    Procedure: ESOPHAGOGASTRODUODENOSCOPY (EGD) with foreign body removal;  Surgeon: Dick Gillis MD;  [facility]     EXAM UNDER ANESTHESIA ANUS N/A 1/10/2017    Procedure: EXAM UNDER ANESTHESIA ANUS;  Surgeon: Annmarie Haynes MD;  [facility]     EXAM UNDER ANESTHESIA RECTUM N/A 7/19/2018    Procedure: EXAM UNDER ANESTHESIA RECTUM;  EXAM UNDER ANESTHESIA, REMOVAL OF RECTAL FOREIGN BODY;  Surgeon: Annmarie Haynes MD;  [facility]     HC REMOVE FECAL IMPACTION OR FB W ANESTHESIA N/A 12/18/2016    Procedure: REMOVE FECAL IMPACTION/FOREIGN BODY UNDER ANESTHESIA;  Surgeon: Soham Cano MD;  Location: [facility]     KNEE SURGERY - removed a small tissue mass from knee Right      LAPAROSCOPIC ABLATION ENDOMETRIOSIS       LAPAROTOMY EXPLORATORY N/A 1/10/2017    Procedure: LAPAROTOMY EXPLORATORY;  Surgeon: Annmarie Haynes MD;  [facility]     LAPAROTOMY EXPLORATORY  09/11/2019    Manual manipulation of bowel to remove pill bottle in rectum     lymph nodes removed from neck; benign  age 7     MAMMOPLASTY REDUCTION Bilateral      RELEASE CARPAL TUNNEL Bilateral      SIGMOIDOSCOPY FLEXIBLE N/A 1/10/2017    Procedure: SIGMOIDOSCOPY FLEXIBLE;  Surgeon: Annmarie Haynes MD;  [facility]     SIGMOIDOSCOPY FLEXIBLE N/A 5/8/2018    Procedure: SIGMOIDOSCOPY FLEXIBLE;  flex sig with foreign body removal using snare and rattooth forcep;  Surgeon: Soham Cano MD;   "Location:  GI     SIGMOIDOSCOPY FLEXIBLE N/A 2/20/2019    Procedure: Exam under anesthesia Colonoscopy with attempted  removal of rectal foreign body;  Surgeon: Estrada Chávez MD;  [facility]       Family History   Problem Relation Age of Onset     Diabetes Type 2  Maternal Grandmother      Diabetes Type 2  Paternal Grandmother      Breast Cancer Paternal Grandmother      Cerebrovascular Disease Father 53     Myocardial Infarction No family hx of      Coronary Artery Disease Early Onset No family hx of      Ovarian Cancer No family hx of      Colon Cancer No family hx of        Social History     Tobacco Use     Smoking status: Never Smoker     Smokeless tobacco: Never Used   Substance Use Topics     Alcohol use: No     Alcohol/week: 0.0 standard drinks          Review of Systems   Constitutional: Negative for chills and fever.   HENT: Negative for congestion and trouble swallowing.    Respiratory: Negative for cough, shortness of breath and wheezing.    Cardiovascular: Negative for chest pain, palpitations and leg swelling.   Gastrointestinal: Positive for abdominal pain, nausea and vomiting. Negative for diarrhea.   Genitourinary: Negative for dysuria.   Musculoskeletal: Negative for back pain and neck pain.   Skin: Negative for rash.   Neurological: Negative for weakness, light-headedness, numbness and headaches.   Hematological: Negative for adenopathy.   Psychiatric/Behavioral: Negative for confusion.         Physical Exam   BP: (!) 153/65  Pulse: 88  Temp: 98.1  F (36.7  C)  Height: 157.5 cm (5' 2\")  Weight: 121.6 kg (268 lb)  SpO2: 96 %  Physical Exam  Vitals signs and nursing note reviewed.   Constitutional:       Appearance: She is well-developed.   HENT:      Head: Normocephalic and atraumatic.   Eyes:      General: No scleral icterus.     Extraocular Movements: Extraocular movements intact.      Pupils: Pupils are equal, round, and reactive to light.   Cardiovascular:      Rate and Rhythm: Normal " rate and regular rhythm.      Heart sounds: No murmur.   Pulmonary:      Effort: Pulmonary effort is normal.      Breath sounds: Normal breath sounds. No wheezing or rales.   Abdominal:      Tenderness: There is abdominal tenderness. There is no guarding or rebound.   Musculoskeletal:      Right lower leg: No edema.      Left lower leg: No edema.   Skin:     General: Skin is warm and dry.   Neurological:      General: No focal deficit present.      Mental Status: She is alert and oriented to person, place, and time.      Cranial Nerves: No cranial nerve deficit.      Sensory: No sensory deficit.      Motor: No weakness.   Psychiatric:         Mood and Affect: Mood normal.         Behavior: Behavior normal.         ED Course      Procedures          Labs/Imaging    Results for orders placed or performed during the hospital encounter of 10/30/20 (from the past 24 hour(s))   CBC with platelets differential   Result Value Ref Range    WBC 9.7 4.0 - 11.0 10e9/L    RBC Count 4.04 3.8 - 5.2 10e12/L    Hemoglobin 11.0 (L) 11.7 - 15.7 g/dL    Hematocrit 34.9 (L) 35.0 - 47.0 %    MCV 86 78 - 100 fl    MCH 27.2 26.5 - 33.0 pg    MCHC 31.5 31.5 - 36.5 g/dL    RDW 13.6 10.0 - 15.0 %    Platelet Count 293 150 - 450 10e9/L    Diff Method Automated Method     % Neutrophils 73.5 %    % Lymphocytes 18.7 %    % Monocytes 6.1 %    % Eosinophils 1.0 %    % Basophils 0.4 %    % Immature Granulocytes 0.3 %    Nucleated RBCs 0 0 /100    Absolute Neutrophil 7.1 1.6 - 8.3 10e9/L    Absolute Lymphocytes 1.8 0.8 - 5.3 10e9/L    Absolute Monocytes 0.6 0.0 - 1.3 10e9/L    Absolute Eosinophils 0.1 0.0 - 0.7 10e9/L    Absolute Basophils 0.0 0.0 - 0.2 10e9/L    Abs Immature Granulocytes 0.0 0 - 0.4 10e9/L    Absolute Nucleated RBC 0.0    Comprehensive metabolic panel   Result Value Ref Range    Sodium 138 133 - 144 mmol/L    Potassium 3.7 3.4 - 5.3 mmol/L    Chloride 105 94 - 109 mmol/L    Carbon Dioxide 27 20 - 32 mmol/L    Anion Gap 6 3 - 14  mmol/L    Glucose 100 (H) 70 - 99 mg/dL    Urea Nitrogen 7 7 - 30 mg/dL    Creatinine 0.56 0.52 - 1.04 mg/dL    GFR Estimate >90 >60 mL/min/[1.73_m2]    GFR Estimate If Black >90 >60 mL/min/[1.73_m2]    Calcium 9.4 8.5 - 10.1 mg/dL    Bilirubin Total 0.2 0.2 - 1.3 mg/dL    Albumin 3.4 3.4 - 5.0 g/dL    Protein Total 7.5 6.8 - 8.8 g/dL    Alkaline Phosphatase 80 40 - 150 U/L    ALT 40 0 - 50 U/L    AST 22 0 - 45 U/L   Lipase   Result Value Ref Range    Lipase 75 73 - 393 U/L   INR   Result Value Ref Range    INR 1.06 0.86 - 1.14   CRP inflammation   Result Value Ref Range    CRP Inflammation 27.0 (H) 0.0 - 8.0 mg/L   Lactic acid whole blood   Result Value Ref Range    Lactic Acid 1.2 0.7 - 2.0 mmol/L   US Abdomen Limited (RUQ)    Narrative    EXAMINATION: Limited Abdominal Ultrasound, 10/30/2020 8:00 PM     COMPARISON: None.    HISTORY: Right upper quadrant pain    FINDINGS:  Exam was slightly difficult to perform due to patient's body habitus.     Fluid: No evidence of ascites or pleural effusions.    Liver: The liver demonstrates possibly increased echogenicity,  measuring 12.7 cm in craniocaudal dimension. There is no gross focal  mass.     Gallbladder: Gallbladder is nondistended and appears to contain some  shadowing debris. There is no wall thickening, pericholecystic fluid,  positive sonographic Shepard's sign or evidence for cholelithiasis.    Bile Ducts: Both the intra- and extrahepatic biliary system are of  normal caliber.  The common bile duct measures 2.4 mm in diameter.    Pancreas: Visualized portions of the head and body of the pancreas are  unremarkable.     Kidney: The right kidney measures 9.1 cm long. There is no  hydronephrosis or hydroureter, no shadowing renal calculi, cystic  lesion or mass.       Impression    IMPRESSION:   The gallbladder is contracted and contains some shadowing debris. No  secondary signs of cholecystitis. Possible hepatic steatosis versus  artifact due to overlying body  wall. Limited evaluation of the hepatic  parenchyma. Remainder of the exam is unremarkable.    I have personally reviewed the examination and initial interpretation  and I agree with the findings.    ROLANDA MCCLAIN MD   XR Abdomen 2 Views    Narrative    Exam: XR ABDOMEN 2 VW, 10/30/2020 8:28 PM    Indication: abdominal pain    Comparison: Same-day abdominal ultrasound, abdominal radiographs  9/18/2020, CT chest abdomen pelvis 12/17/2019, chest radiograph  10/11/2020    Findings:   Upright and supine AP views of the abdomen. Multiple loops of mildly  gas distended bowel. No pneumatosis, portal venous gas or evidence of  free air. There are some calcifications in the right upper quadrant  which may correlate with gallstones present on prior CT and  ultrasound. Mild elevation of the right hemidiaphragm similar to prior  exams. No focal consolidation in the visualized portion of the lower  lungs. Degenerative changes of the spine with an S-shaped scoliotic  curvature. No acute osseous pathology. Pelvic phleboliths.      Impression    Impression:   Nonobstructive bowel gas pattern.    I have personally reviewed the examination and initial interpretation  and I agree with the findings.    ROLANDA MCCLAIN MD       Medications   0.9% sodium chloride BOLUS (1,000 mLs Intravenous New Bag 10/30/20 2006)     Followed by   sodium chloride 0.9% infusion (has no administration in time range)   pantoprazole (PROTONIX) IV push injection 40 mg (has no administration in time range)   HYDROmorphone (PF) (DILAUDID) injection 0.5 mg (has no administration in time range)   ondansetron (ZOFRAN) injection 4 mg (4 mg Intravenous Given 10/30/20 2007)   HYDROmorphone (PF) (DILAUDID) injection 0.5 mg (0.5 mg Intravenous Given 10/30/20 2008)   HYDROmorphone (PF) (DILAUDID) injection 0.5 mg (0.5 mg Intravenous Given 10/30/20 2124)        Assessments & Plan (with Medical Decision Making)   Impression:  Young female with history of  depression chronic abdominal pain and repeated foreign body insertions and ingestions. She presents with 2-3 day of constant RUQ abdominal pain, nausea, vomiting and light colored stools. She has gallstones in the gallbladder on US (and on past CTs). She has a contracted gallbladder on US tonight with probable gallstones. There is no gallbladder wall thickening and bile ducts are of normal caliber. Her CBC and LFTs are normal. Her pain continues. With ongoing pain and report of chuck colored stools HIDA scan or MRCP would be the next step in evaluation. She will be admitted to the ED observation service. Given her past compulsive ingestion behaviors when stressed, sitter or safety room will be needed.    I have reviewed the nursing notes. I have reviewed the findings, diagnosis, plan and need for follow up with the patient.    New Prescriptions    No medications on file       Final diagnoses:   RUQ abdominal pain   Gallstones       --    [facility] EMERGENCY DEPARTMENT  10/30/2020     Gustavo Quintero MD  10/30/20 [id number]

## 2020-10-31 ENCOUNTER — APPOINTMENT (OUTPATIENT)
Dept: NUCLEAR MEDICINE | Facility: CLINIC | Age: 30
End: 2020-10-31
Payer: COMMERCIAL

## 2020-10-31 VITALS
BODY MASS INDEX: 49.32 KG/M2 | HEART RATE: 117 BPM | TEMPERATURE: 98.4 F | DIASTOLIC BLOOD PRESSURE: 102 MMHG | WEIGHT: 268 LBS | OXYGEN SATURATION: 93 % | HEIGHT: 62 IN | RESPIRATION RATE: 16 BRPM | SYSTOLIC BLOOD PRESSURE: 139 MMHG

## 2020-10-31 LAB
ALBUMIN SERPL-MCNC: 3.1 G/DL (ref 3.4–5)
ALBUMIN UR-MCNC: 10 MG/DL
ALP SERPL-CCNC: 76 U/L (ref 40–150)
ALT SERPL W P-5'-P-CCNC: 35 U/L (ref 0–50)
ANION GAP SERPL CALCULATED.3IONS-SCNC: 4 MMOL/L (ref 3–14)
APPEARANCE UR: CLEAR
AST SERPL W P-5'-P-CCNC: 28 U/L (ref 0–45)
BASOPHILS # BLD AUTO: 0 10E9/L (ref 0–0.2)
BASOPHILS NFR BLD AUTO: 0.6 %
BILIRUB SERPL-MCNC: 0.3 MG/DL (ref 0.2–1.3)
BILIRUB UR QL STRIP: NEGATIVE
BUN SERPL-MCNC: 8 MG/DL (ref 7–30)
CALCIUM SERPL-MCNC: 8.4 MG/DL (ref 8.5–10.1)
CHLORIDE SERPL-SCNC: 110 MMOL/L (ref 94–109)
CO2 SERPL-SCNC: 27 MMOL/L (ref 20–32)
COLOR UR AUTO: YELLOW
CREAT SERPL-MCNC: 0.66 MG/DL (ref 0.52–1.04)
CRP SERPL-MCNC: 26 MG/L (ref 0–8)
DIFFERENTIAL METHOD BLD: ABNORMAL
EOSINOPHIL # BLD AUTO: 0.1 10E9/L (ref 0–0.7)
EOSINOPHIL NFR BLD AUTO: 1.9 %
ERYTHROCYTE [DISTWIDTH] IN BLOOD BY AUTOMATED COUNT: 13.7 % (ref 10–15)
GFR SERPL CREATININE-BSD FRML MDRD: >90 ML/MIN/{1.73_M2}
GLUCOSE SERPL-MCNC: 100 MG/DL (ref 70–99)
GLUCOSE UR STRIP-MCNC: NEGATIVE MG/DL
HCG UR QL: NEGATIVE
HCT VFR BLD AUTO: 34 % (ref 35–47)
HGB BLD-MCNC: 10.2 G/DL (ref 11.7–15.7)
HGB UR QL STRIP: NEGATIVE
HYALINE CASTS #/AREA URNS LPF: 7 /LPF (ref 0–2)
IMM GRANULOCYTES # BLD: 0 10E9/L (ref 0–0.4)
IMM GRANULOCYTES NFR BLD: 0.2 %
KETONES UR STRIP-MCNC: NEGATIVE MG/DL
LABORATORY COMMENT REPORT: NORMAL
LEUKOCYTE ESTERASE UR QL STRIP: NEGATIVE
LIPASE SERPL-CCNC: 98 U/L (ref 73–393)
LYMPHOCYTES # BLD AUTO: 1.6 10E9/L (ref 0.8–5.3)
LYMPHOCYTES NFR BLD AUTO: 30.8 %
MAGNESIUM SERPL-MCNC: 2 MG/DL (ref 1.6–2.3)
MCH RBC QN AUTO: 26.8 PG (ref 26.5–33)
MCHC RBC AUTO-ENTMCNC: 30 G/DL (ref 31.5–36.5)
MCV RBC AUTO: 90 FL (ref 78–100)
MONOCYTES # BLD AUTO: 0.5 10E9/L (ref 0–1.3)
MONOCYTES NFR BLD AUTO: 10.1 %
MUCOUS THREADS #/AREA URNS LPF: PRESENT /LPF
NEUTROPHILS # BLD AUTO: 2.9 10E9/L (ref 1.6–8.3)
NEUTROPHILS NFR BLD AUTO: 56.4 %
NITRATE UR QL: NEGATIVE
NRBC # BLD AUTO: 0 10*3/UL
NRBC BLD AUTO-RTO: 0 /100
PH UR STRIP: 5 PH (ref 5–7)
PLATELET # BLD AUTO: 256 10E9/L (ref 150–450)
POTASSIUM SERPL-SCNC: 3.9 MMOL/L (ref 3.4–5.3)
PROT SERPL-MCNC: 6.7 G/DL (ref 6.8–8.8)
RBC # BLD AUTO: 3.8 10E12/L (ref 3.8–5.2)
RBC #/AREA URNS AUTO: 1 /HPF (ref 0–2)
SARS-COV-2 RNA SPEC QL NAA+PROBE: NEGATIVE
SARS-COV-2 RNA SPEC QL NAA+PROBE: NORMAL
SODIUM SERPL-SCNC: 141 MMOL/L (ref 133–144)
SOURCE: ABNORMAL
SP GR UR STRIP: 1.02 (ref 1–1.03)
SPECIMEN SOURCE: NORMAL
SPECIMEN SOURCE: NORMAL
SQUAMOUS #/AREA URNS AUTO: <1 /HPF (ref 0–1)
UROBILINOGEN UR STRIP-MCNC: NORMAL MG/DL (ref 0–2)
WBC # BLD AUTO: 5.2 10E9/L (ref 4–11)
WBC #/AREA URNS AUTO: 0 /HPF (ref 0–5)

## 2020-10-31 PROCEDURE — U0003 INFECTIOUS AGENT DETECTION BY NUCLEIC ACID (DNA OR RNA); SEVERE ACUTE RESPIRATORY SYNDROME CORONAVIRUS 2 (SARS-COV-2) (CORONAVIRUS DISEASE [COVID-19]), AMPLIFIED PROBE TECHNIQUE, MAKING USE OF HIGH THROUGHPUT TECHNOLOGIES AS DESCRIBED BY CMS-2020-01-R: HCPCS | Performed by: INTERNAL MEDICINE

## 2020-10-31 PROCEDURE — 250N000011 HC RX IP 250 OP 636: Performed by: PHYSICIAN ASSISTANT

## 2020-10-31 PROCEDURE — 96361 HYDRATE IV INFUSION ADD-ON: CPT | Performed by: INTERNAL MEDICINE

## 2020-10-31 PROCEDURE — C9113 INJ PANTOPRAZOLE SODIUM, VIA: HCPCS | Performed by: PHYSICIAN ASSISTANT

## 2020-10-31 PROCEDURE — 258N000003 HC RX IP 258 OP 636: Performed by: INTERNAL MEDICINE

## 2020-10-31 PROCEDURE — 78227 HEPATOBIL SYST IMAGE W/DRUG: CPT | Mod: 26 | Performed by: RADIOLOGY

## 2020-10-31 PROCEDURE — 250N000013 HC RX MED GY IP 250 OP 250 PS 637: Performed by: INTERNAL MEDICINE

## 2020-10-31 PROCEDURE — 86140 C-REACTIVE PROTEIN: CPT | Performed by: INTERNAL MEDICINE

## 2020-10-31 PROCEDURE — 250N000011 HC RX IP 250 OP 636: Performed by: INTERNAL MEDICINE

## 2020-10-31 PROCEDURE — 80053 COMPREHEN METABOLIC PANEL: CPT | Performed by: INTERNAL MEDICINE

## 2020-10-31 PROCEDURE — 83735 ASSAY OF MAGNESIUM: CPT | Performed by: INTERNAL MEDICINE

## 2020-10-31 PROCEDURE — G0378 HOSPITAL OBSERVATION PER HR: HCPCS

## 2020-10-31 PROCEDURE — 343N000001 HC RX 343: Performed by: PHYSICIAN ASSISTANT

## 2020-10-31 PROCEDURE — 81025 URINE PREGNANCY TEST: CPT | Performed by: PHYSICIAN ASSISTANT

## 2020-10-31 PROCEDURE — C9803 HOPD COVID-19 SPEC COLLECT: HCPCS | Performed by: INTERNAL MEDICINE

## 2020-10-31 PROCEDURE — 250N000013 HC RX MED GY IP 250 OP 250 PS 637: Performed by: PHYSICIAN ASSISTANT

## 2020-10-31 PROCEDURE — 78227 HEPATOBIL SYST IMAGE W/DRUG: CPT

## 2020-10-31 PROCEDURE — 96375 TX/PRO/DX INJ NEW DRUG ADDON: CPT | Performed by: INTERNAL MEDICINE

## 2020-10-31 PROCEDURE — 99236 HOSP IP/OBS SAME DATE HI 85: CPT | Performed by: FAMILY MEDICINE

## 2020-10-31 PROCEDURE — 83690 ASSAY OF LIPASE: CPT | Performed by: INTERNAL MEDICINE

## 2020-10-31 PROCEDURE — 36415 COLL VENOUS BLD VENIPUNCTURE: CPT | Performed by: INTERNAL MEDICINE

## 2020-10-31 PROCEDURE — 81001 URINALYSIS AUTO W/SCOPE: CPT | Performed by: PHYSICIAN ASSISTANT

## 2020-10-31 PROCEDURE — 85025 COMPLETE CBC W/AUTO DIFF WBC: CPT | Performed by: INTERNAL MEDICINE

## 2020-10-31 PROCEDURE — 96376 TX/PRO/DX INJ SAME DRUG ADON: CPT | Performed by: INTERNAL MEDICINE

## 2020-10-31 PROCEDURE — A9537 TC99M MEBROFENIN: HCPCS | Performed by: PHYSICIAN ASSISTANT

## 2020-10-31 PROCEDURE — 87210 SMEAR WET MOUNT SALINE/INK: CPT | Performed by: PHYSICIAN ASSISTANT

## 2020-10-31 RX ORDER — KIT FOR THE PREPARATION OF TECHNETIUM TC 99M MEBROFENIN 45 MG/10ML
4.8-7.2 INJECTION, POWDER, LYOPHILIZED, FOR SOLUTION INTRAVENOUS ONCE
Status: COMPLETED | OUTPATIENT
Start: 2020-10-31 | End: 2020-10-31

## 2020-10-31 RX ORDER — CLONIDINE HYDROCHLORIDE 0.1 MG/1
0.1 TABLET ORAL 2 TIMES DAILY
Status: DISCONTINUED | OUTPATIENT
Start: 2020-10-31 | End: 2020-10-31 | Stop reason: HOSPADM

## 2020-10-31 RX ORDER — HYDROMORPHONE HYDROCHLORIDE 1 MG/ML
0.5 INJECTION, SOLUTION INTRAMUSCULAR; INTRAVENOUS; SUBCUTANEOUS EVERY 4 HOURS PRN
Status: DISCONTINUED | OUTPATIENT
Start: 2020-10-31 | End: 2020-10-31 | Stop reason: HOSPADM

## 2020-10-31 RX ORDER — BUSPIRONE HYDROCHLORIDE 15 MG/1
15 TABLET ORAL 2 TIMES DAILY
Status: DISCONTINUED | OUTPATIENT
Start: 2020-10-31 | End: 2020-10-31 | Stop reason: HOSPADM

## 2020-10-31 RX ORDER — PREGABALIN 50 MG/1
50 CAPSULE ORAL 3 TIMES DAILY
Status: DISCONTINUED | OUTPATIENT
Start: 2020-10-31 | End: 2020-10-31 | Stop reason: HOSPADM

## 2020-10-31 RX ORDER — HYDROMORPHONE HYDROCHLORIDE 1 MG/ML
0.5 INJECTION, SOLUTION INTRAMUSCULAR; INTRAVENOUS; SUBCUTANEOUS ONCE
Status: COMPLETED | OUTPATIENT
Start: 2020-10-31 | End: 2020-10-31

## 2020-10-31 RX ORDER — METOCLOPRAMIDE HYDROCHLORIDE 5 MG/ML
10 INJECTION INTRAMUSCULAR; INTRAVENOUS ONCE
Status: COMPLETED | OUTPATIENT
Start: 2020-10-31 | End: 2020-10-31

## 2020-10-31 RX ORDER — DESVENLAFAXINE 50 MG/1
100 TABLET, FILM COATED, EXTENDED RELEASE ORAL DAILY
Status: DISCONTINUED | OUTPATIENT
Start: 2020-10-31 | End: 2020-10-31 | Stop reason: HOSPADM

## 2020-10-31 RX ORDER — METOCLOPRAMIDE HYDROCHLORIDE 5 MG/ML
10 INJECTION INTRAMUSCULAR; INTRAVENOUS EVERY 6 HOURS PRN
Status: DISCONTINUED | OUTPATIENT
Start: 2020-10-31 | End: 2020-10-31 | Stop reason: HOSPADM

## 2020-10-31 RX ORDER — HYDROXYCHLOROQUINE SULFATE 200 MG/1
200 TABLET, FILM COATED ORAL 2 TIMES DAILY
Status: DISCONTINUED | OUTPATIENT
Start: 2020-10-31 | End: 2020-10-31 | Stop reason: HOSPADM

## 2020-10-31 RX ORDER — PREGABALIN 50 MG/1
50 CAPSULE ORAL 3 TIMES DAILY
Status: DISCONTINUED | OUTPATIENT
Start: 2020-10-31 | End: 2020-10-31

## 2020-10-31 RX ORDER — ONDANSETRON 2 MG/ML
4 INJECTION INTRAMUSCULAR; INTRAVENOUS ONCE
Status: COMPLETED | OUTPATIENT
Start: 2020-10-31 | End: 2020-10-31

## 2020-10-31 RX ADMIN — SINCALIDE 2.4 MCG: 5 INJECTION, POWDER, LYOPHILIZED, FOR SOLUTION INTRAVENOUS at 12:15

## 2020-10-31 RX ADMIN — HYDROXYCHLOROQUINE SULFATE 200 MG: 200 TABLET, FILM COATED ORAL at 08:00

## 2020-10-31 RX ADMIN — HYDROMORPHONE HYDROCHLORIDE 0.5 MG: 1 INJECTION, SOLUTION INTRAMUSCULAR; INTRAVENOUS; SUBCUTANEOUS at 08:02

## 2020-10-31 RX ADMIN — SODIUM CHLORIDE 1000 ML: 9 INJECTION, SOLUTION INTRAVENOUS at 00:52

## 2020-10-31 RX ADMIN — PREGABALIN 50 MG: 50 CAPSULE ORAL at 01:04

## 2020-10-31 RX ADMIN — PANTOPRAZOLE SODIUM 40 MG: 40 INJECTION, POWDER, FOR SOLUTION INTRAVENOUS at 08:01

## 2020-10-31 RX ADMIN — METOCLOPRAMIDE HYDROCHLORIDE 10 MG: 5 INJECTION INTRAMUSCULAR; INTRAVENOUS at 10:33

## 2020-10-31 RX ADMIN — HYDROMORPHONE HYDROCHLORIDE 0.5 MG: 1 INJECTION, SOLUTION INTRAMUSCULAR; INTRAVENOUS; SUBCUTANEOUS at 00:52

## 2020-10-31 RX ADMIN — PREGABALIN 50 MG: 50 CAPSULE ORAL at 08:01

## 2020-10-31 RX ADMIN — CLONIDINE HYDROCHLORIDE 0.1 MG: 0.1 TABLET ORAL at 08:00

## 2020-10-31 RX ADMIN — MEBROFENIN 5.5 MCI.: 45 INJECTION, POWDER, LYOPHILIZED, FOR SOLUTION INTRAVENOUS at 12:45

## 2020-10-31 RX ADMIN — METOCLOPRAMIDE HYDROCHLORIDE 10 MG: 5 INJECTION INTRAMUSCULAR; INTRAVENOUS at 03:38

## 2020-10-31 RX ADMIN — HYDROMORPHONE HYDROCHLORIDE 0.5 MG: 1 INJECTION, SOLUTION INTRAMUSCULAR; INTRAVENOUS; SUBCUTANEOUS at 03:38

## 2020-10-31 RX ADMIN — DESVENLAFAXINE SUCCINATE 100 MG: 50 TABLET, EXTENDED RELEASE ORAL at 08:00

## 2020-10-31 RX ADMIN — HYDROMORPHONE HYDROCHLORIDE 0.5 MG: 1 INJECTION, SOLUTION INTRAMUSCULAR; INTRAVENOUS; SUBCUTANEOUS at 06:33

## 2020-10-31 RX ADMIN — ONDANSETRON 4 MG: 2 INJECTION INTRAMUSCULAR; INTRAVENOUS at 00:52

## 2020-10-31 RX ADMIN — HYDROMORPHONE HYDROCHLORIDE 0.5 MG: 1 INJECTION, SOLUTION INTRAMUSCULAR; INTRAVENOUS; SUBCUTANEOUS at 15:32

## 2020-10-31 RX ADMIN — SINCALIDE 2.4 MCG: 5 INJECTION, POWDER, LYOPHILIZED, FOR SOLUTION INTRAVENOUS at 14:10

## 2020-10-31 RX ADMIN — METOCLOPRAMIDE HYDROCHLORIDE 10 MG: 5 INJECTION INTRAMUSCULAR; INTRAVENOUS at 16:58

## 2020-10-31 RX ADMIN — PREGABALIN 50 MG: 50 CAPSULE ORAL at 15:32

## 2020-10-31 RX ADMIN — SODIUM CHLORIDE: 9 INJECTION, SOLUTION INTRAVENOUS at 08:04

## 2020-10-31 RX ADMIN — BUSPIRONE HYDROCHLORIDE 15 MG: 15 TABLET ORAL at 08:00

## 2020-10-31 NOTE — H&P
[facility], [place]     History and Physical - Hospitalist Service, Gold                                                                                                                                                                                                                                      Date of Admission:  10/30/2020    Assessment & Plan   Nevin Alvarado is a 29 year old female admitted on 10/30/2020. She with a history of ADD, MDD, CANDICE, BPD, PTSD, anorexia nervosa, obesity, CIDP (chronic inflammatory demyelinating polyneuropathy), provoked PE (no longer on anticoagulation), frequent admissions for foreign body ingestion, esophageal perforation s/p esophageal stent (10/9/19) who presented to the ED with abdominal pain.     ##. RUQ Abdominal Pain: worsening RUQ abdominal pain for the last 5 days. Pain is sharp and constant mostly in the RUQ and epigastric area wrapping around to her back and also some pain in the suprapubic area; intermittent sharp pain in the left side. Associated nausea, episode of non bloody emesis. Reports white stools. Otherwise denies fever, chills, URI symptoms, diarrhea, melena, hematochezia. In ED, HR , -153/65-89, SaO2 94-96% on RA, Temp 98.1. Normal CMP, lactic acid, lipase, INR. CBC with H/H 11/34.9 otherwise normal. CRP 27. Abdominal US reports gallbladder is contracted and contains some shadowing debris, no secondary signs of cholecystitis; possible hepatic steatosis versus artifact due to overlying body wall. Strangely she had an US abdomen at [facility] yesterday which makes not mention of gallstones. AXR reports non obstructive bowel gas pattern and that at [facility] on 10/29/20 reports a 1.2cm indeterminate radiopacity projecting over the RUQ. CT abdomen w/o contrast at [facility] on 10/30.20 reported interval development of partial mural calcification of the gallbladder fundus. In ED patient was given 2L NS bolus,  dilaudid 0.5mg IV x 4, zofran 4mg IV x 2, protonix 40mg IV x 1.   - NS 125ml/hr  - NPO  - Protonix 40mg IV BID  - Zofran 4mg IV q6h prn  - Reglan 10mg q6h prn  - Intermittent doses of dilaudid 0.5mg IV prn  - HIDA scan in AM  - Send UA, UPT  - Follow up on Covid 19 swab  - Surgery consult after Hida scan    ##. Vaginal Itch and Discharge: On exam white patches notes. Swab collected to send for wet prep  - Wet prep sent    ##. Suicidal Thoughts: Patient states upon arrival to this place, it triggered this feeling however has improved over time being here  - 1:1 sitter  - Psych consult    Chronic Medical problems:   ##. PTSD, BPD, ADD: - Continue PTA buspirone, desvenlafaxine, hydroxyzine, lurasidone, pregabalin  ##. H/o granuloma annulare: - Continue PTA plaquenil  ##. Metabolic syndrome: - Hold metformin for any potential contrast studies       Diet:   NPO  DVT Prophylaxis: Expect short Obs stay  Hazel Catheter: not present  Code Status:   full code         Disposition Plan   Expected discharge: Tomorrow, recommended to prior living arrangement once adequate pain management/ tolerating PO medications, antibiotic plan established and safe disposition plan/ TCU bed available.  Entered: CARMELO Astorga 10/31/2020, 1:30 AM     The patient's care was discussed with the Attending Physician, Dr. Garcia.                                                                                                                                                                                                                                                                                                                                             CARMELO Astorga  [facility]   Contact information available via [facility] Paging/Directory      ______________________________________________________________________    Chief Complaint   Abdominal pain     History is obtained from the  patient    History of Present Illness   Nevin Alvarado is a 29 year old female with a history of ADD, MDD, CANDICE, BPD, PTSD, anorexia nervosa, obesity, CIDP (chronic inflammatory demyelinating polyneuropathy), provoked PE (no longer on anticoagulation), frequent admissions for foreign body ingestion, esophageal perforation s/p esophageal stent (10/9/19) who presented to the ED with abdominal pain.     Patient reports worsening RUQ abdominal pain for the last 5 days. She reports being seen at [facility] yesterday in the ED and diagnosed with gallstones and was asked to see General Surgery as an outpatient however she states there was no contact information in her discharge paper work. She states her pain as been worse today. Pain is sharp and constant mostly in the RUQ and epigastric area wrapping around to her back and also some pain in the suprapubic area; intermittent sharp pain in the left side. Associated nausea, episode of non bloody emesis. Reports white stools. Notes she had pizza yesterday and some peanut butter and jelly sandwich. Otherwise denies fever, chills, URI symptoms, diarrhea, melena, hematochezia.    Reports suicidal thoughts upon arriving to the [place].     In the ED, HR , -153/65-89, SaO2 94-96% on RA, Temp 98.1. Labs show normal CMP, lactic acid, lipase, INR. CBC with H/H 11/34.9 otherwise normal. CRP 27. Abdominal US reports gallbladder is contracted and contains some shadowing debris, no secondary signs of cholecystitis; possible hepatic steatosis versus artifact due to overlying body wall. Strangely she had an US abdomen at [facility] yesterday which makes not mention of gallstones. AXR reports non obstructive bowel gas pattern and that at [facility] on 10/29/20 reports a 1.2cm indeterminate radiopacity projecting over the RUQ. CT abdomen w/o contrast at [facility] on 10/30.20 reported interval development of partial mural calcification of the gallbladder fundus. In the ED the  patient was given 2L NS bolus, dilaudid 0.5mg IV x 4, zofran 4mg IV x 2, protonix 40mg IV x 1. She is being admitted to the ED observation Unit for HIDA scan or MRCP which would be the next step in evaluation.      Review of Systems    All other ROS negative except those mentioned in above note.      Past Medical History    I have reviewed this patient's medical history and updated it with pertinent information if needed.   Past Medical History:   Diagnosis Date     ADD (attention deficit disorder)      Anorexia nervosa with bulimia     history of; on Topamax     Anxiety      Borderline personality disorder (H)      Depression      H/O self-harm      History of pulmonary embolism 12/2019    Provoked. Completed 3 month course of Apixaban     Morbid obesity (H)      Neuropathy      PTSD (post-traumatic stress disorder)      Rectal foreign body - Recurrent issue, self placed      Suicide attempt (H) 2/21/2018     Swallowed foreign body - Recurrent issue, self placed        Past Surgical History   I have reviewed this patient's surgical history and updated it with pertinent information if needed.  Past Surgical History:   Procedure Laterality Date     COMBINED ESOPHAGOSCOPY, GASTROSCOPY, DUODENOSCOPY (EGD), REPLACE ESOPHAGEAL STENT N/A 10/9/2019    Procedure: Upper Endoscopy with Suture Placement;  Surgeon: Zurdo Ramirez MD;  [facility]     ESOPHAGOSCOPY, GASTROSCOPY, DUODENOSCOPY (EGD), COMBINED N/A 3/9/2017    Procedure: COMBINED ESOPHAGOSCOPY, GASTROSCOPY, DUODENOSCOPY (EGD), REMOVE FOREIGN BODY;  Surgeon: Avis Guzmán MD;  [facility]     ESOPHAGOSCOPY, GASTROSCOPY, DUODENOSCOPY (EGD), COMBINED N/A 4/20/2017    Procedure: COMBINED ESOPHAGOSCOPY, GASTROSCOPY, DUODENOSCOPY (EGD), REMOVE FOREIGN BODY;  EGD removal Foregin body;  Surgeon: Lokesh Paula MD;  [facility]     ESOPHAGOSCOPY, GASTROSCOPY, DUODENOSCOPY (EGD), COMBINED N/A 6/12/2017    Procedure: COMBINED  ESOPHAGOSCOPY, GASTROSCOPY, DUODENOSCOPY (EGD);  COMBINED ESOPHAGOSCOPY, GASTROSCOPY, DUODENOSCOPY (EGD) [[id number]]attempted removal of foreign body;  Surgeon: Pamela Perez MD;  [facility]     ESOPHAGOSCOPY, GASTROSCOPY, DUODENOSCOPY (EGD), COMBINED N/A 6/9/2017    Procedure: COMBINED ESOPHAGOSCOPY, GASTROSCOPY, DUODENOSCOPY (EGD), REMOVE FOREIGN BODY;  Esophagoscopy, Gastroscopy, Duodenoscopy, Removal of Foreign Body;  Surgeon: Dejon Marsh MD;  [facility]     ESOPHAGOSCOPY, GASTROSCOPY, DUODENOSCOPY (EGD), COMBINED N/A 1/6/2018    Procedure: COMBINED ESOPHAGOSCOPY, GASTROSCOPY, DUODENOSCOPY (EGD), REMOVE FOREIGN BODY;  COMBINED ESOPHAGOSCOPY, GASTROSCOPY, DUODENOSCOPY (EGD) [by pascal net and snare with profol sedation;  Surgeon: Feliciano Emmanuel MD;  Location:  GI     ESOPHAGOSCOPY, GASTROSCOPY, DUODENOSCOPY (EGD), COMBINED N/A 3/19/2018    Procedure: COMBINED ESOPHAGOSCOPY, GASTROSCOPY, DUODENOSCOPY (EGD), REMOVE FOREIGN BODY;   Esophagodscopy, Gastroscopy, Duodenoscopy,Foreign Body Removal;  Surgeon: Brice Guzmán MD;  [facility]     ESOPHAGOSCOPY, GASTROSCOPY, DUODENOSCOPY (EGD), COMBINED N/A 4/16/2018    Procedure: COMBINED ESOPHAGOSCOPY, GASTROSCOPY, DUODENOSCOPY (EGD), REMOVE FOREIGN BODY;  Esophagogastroduodenoscopy  Foreign Body Removal X 2;  Surgeon: Royer Moise MD;  [facility]     ESOPHAGOSCOPY, GASTROSCOPY, DUODENOSCOPY (EGD), COMBINED N/A 6/1/2018    Procedure: COMBINED ESOPHAGOSCOPY, GASTROSCOPY, DUODENOSCOPY (EGD), REMOVE FOREIGN BODY;  COMBINED ESOPHAGOSCOPY, GASTROSCOPY, DUODENOSCOPY with removal of foreign body, propofol sedation by anesthesia;  Surgeon: Brice Martinez MD;  Location:  GI     ESOPHAGOSCOPY, GASTROSCOPY, DUODENOSCOPY (EGD), COMBINED N/A 7/25/2018    Procedure: COMBINED ESOPHAGOSCOPY, GASTROSCOPY, DUODENOSCOPY (EGD), REMOVE FOREIGN BODY;;  Surgeon: Candy Castelan MD;  Location:  GI      ESOPHAGOSCOPY, GASTROSCOPY, DUODENOSCOPY (EGD), COMBINED N/A 7/28/2018    Procedure: COMBINED ESOPHAGOSCOPY, GASTROSCOPY, DUODENOSCOPY (EGD), REMOVE FOREIGN BODY;  COMBINED ESOPHAGOSCOPY, GASTROSCOPY, DUODENOSCOPY (EGD), REMOVE FOREIGN BODY;  Surgeon: Brice Guzmán MD;  [facility]     ESOPHAGOSCOPY, GASTROSCOPY, DUODENOSCOPY (EGD), COMBINED N/A 7/31/2018    Procedure: COMBINED ESOPHAGOSCOPY, GASTROSCOPY, DUODENOSCOPY (EGD);  COMBINED ESOPHAGOSCOPY, GASTROSCOPY, DUODENOSCOPY (EGD) TO REMOVE FOREIGN BODY;  Surgeon: Lokesh Paula MD;  [facility]     ESOPHAGOSCOPY, GASTROSCOPY, DUODENOSCOPY (EGD), COMBINED N/A 8/4/2018    Procedure: COMBINED ESOPHAGOSCOPY, GASTROSCOPY, DUODENOSCOPY (EGD), REMOVE FOREIGN BODY;   combined esophagoscopy, gastroscopy, duodenoscopy, REMOVE FOREIGN BODY ;  Surgeon: Lokesh Paula MD;  [facility]     ESOPHAGOSCOPY, GASTROSCOPY, DUODENOSCOPY (EGD), COMBINED N/A 10/6/2019    Procedure: ESOPHAGOGASTRODUODENOSCOPY (EGD) with fireign body removal x2, esophageal stent placement with floroscopy;  Surgeon: Timoteo Espana MD;  [facility]     ESOPHAGOSCOPY, GASTROSCOPY, DUODENOSCOPY (EGD), COMBINED N/A 12/2/2019    Procedure: Esophagogastroduodenoscopy with esophageal stent removal, esophogram;  Surgeon: Kailee Tena MD;  [facility]     ESOPHAGOSCOPY, GASTROSCOPY, DUODENOSCOPY (EGD), COMBINED N/A 12/17/2019    Procedure: ESOPHAGOGASTRODUODENOSCOPY, WITH FOREIGN BODY REMOVAL;  Surgeon: Pamela Perez MD;  [facility]     ESOPHAGOSCOPY, GASTROSCOPY, DUODENOSCOPY (EGD), COMBINED N/A 12/13/2019    Procedure: ESOPHAGOGASTRODUODENOSCOPY, WITH FOREIGN BODY REMOVAL;  Surgeon: Samia Stanton MD;  [facility]     ESOPHAGOSCOPY, GASTROSCOPY, DUODENOSCOPY (EGD), COMBINED N/A 12/28/2019    Procedure: ESOPHAGOGASTRODUODENOSCOPY (EGD) Removal of Foreign Body X 2;  Surgeon: Huy Kelley MD;  [facility]     ESOPHAGOSCOPY,  GASTROSCOPY, DUODENOSCOPY (EGD), COMBINED N/A 1/5/2020    Procedure: ESOPHAGOGASTRODUOENOSCOPY WITH FOREIGN BODY REMOVAL;  Surgeon: Pamela Perez MD;  [facility]     ESOPHAGOSCOPY, GASTROSCOPY, DUODENOSCOPY (EGD), COMBINED N/A 1/3/2020    Procedure: ESOPHAGOGASTRODUODENOSCOPY (EGD) REMOVAL OF FOREIGN BODY.;  Surgeon: Pamela Perez MD;  [facility]     ESOPHAGOSCOPY, GASTROSCOPY, DUODENOSCOPY (EGD), COMBINED N/A 1/13/2020    Procedure: ESOPHAGOGASTRODUODENOSCOPY (EGD) for foreign body removal;  Surgeon: Lokesh Paula MD;  [facility]     ESOPHAGOSCOPY, GASTROSCOPY, DUODENOSCOPY (EGD), COMBINED N/A 1/18/2020    Procedure: Diagnostic ESOPHAGOGASTRODUODENOSCOPY (EGD;  Surgeon: Lokesh Paula MD;  [facility]     ESOPHAGOSCOPY, GASTROSCOPY, DUODENOSCOPY (EGD), COMBINED N/A 3/29/2020    Procedure: UPPER ENDOSCOPY WITH FOREIGN BODY REMOVAL;  Surgeon: Doug Hansen MD;  [facility]     ESOPHAGOSCOPY, GASTROSCOPY, DUODENOSCOPY (EGD), COMBINED N/A 7/11/2020    Procedure: ESOPHAGOGASTRODUODENOSCOPY (EGD); Upper Endoscopy WITH FOREIGN BODY REMOVAL;  Surgeon: Lyndsey Mendoza DO;  [facility]     ESOPHAGOSCOPY, GASTROSCOPY, DUODENOSCOPY (EGD), COMBINED N/A 7/29/2020    Procedure: ESOPHAGOGASTRODUODENOSCOPY REMOVAL OF FOREIGN BODY;  Surgeon: Samia Stanton MD;  [facility]     ESOPHAGOSCOPY, GASTROSCOPY, DUODENOSCOPY (EGD), COMBINED N/A 8/1/2020    Procedure: ESOPHAGOGASTRODUODENOSCOPY, WITH FOREIGN BODY REMOVAL;  Surgeon: Pamela Perez MD;  [facility]     ESOPHAGOSCOPY, GASTROSCOPY, DUODENOSCOPY (EGD), COMBINED N/A 8/18/2020    Procedure: ESOPHAGOGASTRODUODENOSCOPY (EGD) for foreign body removal;  Surgeon: Pamela Perez MD;  [facility]     ESOPHAGOSCOPY, GASTROSCOPY, DUODENOSCOPY (EGD), COMBINED N/A 8/27/2020    Procedure: ESOPHAGOGASTRODUODENOSCOPY (EGD) with foreign body removal;  Surgeon: Ken  Campbell Pablo MD;  [facility]     ESOPHAGOSCOPY, GASTROSCOPY, DUODENOSCOPY (EGD), COMBINED N/A 9/18/2020    Procedure: ESOPHAGOGASTRODUODENOSCOPY (EGD) with foreign body removal;  Surgeon: Dick Gillis MD;  [facility]     EXAM UNDER ANESTHESIA ANUS N/A 1/10/2017    Procedure: EXAM UNDER ANESTHESIA ANUS;  Surgeon: Annmarie Haynes MD;  [facility]     EXAM UNDER ANESTHESIA RECTUM N/A 7/19/2018    Procedure: EXAM UNDER ANESTHESIA RECTUM;  EXAM UNDER ANESTHESIA, REMOVAL OF RECTAL FOREIGN BODY;  Surgeon: Annmarie Haynes MD;  [facility]     HC REMOVE FECAL IMPACTION OR FB W ANESTHESIA N/A 12/18/2016    Procedure: REMOVE FECAL IMPACTION/FOREIGN BODY UNDER ANESTHESIA;  Surgeon: Soham Cano MD;  Location:  OR     KNEE SURGERY - removed a small tissue mass from knee Right      LAPAROSCOPIC ABLATION ENDOMETRIOSIS       LAPAROTOMY EXPLORATORY N/A 1/10/2017    Procedure: LAPAROTOMY EXPLORATORY;  Surgeon: Annmarie Haynes MD;  [facility]     LAPAROTOMY EXPLORATORY  09/11/2019    Manual manipulation of bowel to remove pill bottle in rectum     lymph nodes removed from neck; benign  age 7     MAMMOPLASTY REDUCTION Bilateral      RELEASE CARPAL TUNNEL Bilateral      SIGMOIDOSCOPY FLEXIBLE N/A 1/10/2017    Procedure: SIGMOIDOSCOPY FLEXIBLE;  Surgeon: Annmarie Haynes MD;  [facility]     SIGMOIDOSCOPY FLEXIBLE N/A 5/8/2018    Procedure: SIGMOIDOSCOPY FLEXIBLE;  flex sig with foreign body removal using snare and rattooth forcep;  Surgeon: Soham Cano MD;  Location:  GI     SIGMOIDOSCOPY FLEXIBLE N/A 2/20/2019    Procedure: Exam under anesthesia Colonoscopy with attempted  removal of rectal foreign body;  Surgeon: Estrada Chávez MD;  [facility]       Social History   I have reviewed this patient's social history and updated it with pertinent information if needed.  Social History     Tobacco Use     Smoking status: Never Smoker      Smokeless tobacco: Never Used   Substance Use Topics     Alcohol use: No     Alcohol/week: 0.0 standard drinks     Drug use: No       Family History   I have reviewed this patient's family history and updated it with pertinent information if needed.  Family History   Problem Relation Age of Onset     Diabetes Type 2  Maternal Grandmother      Diabetes Type 2  Paternal Grandmother      Breast Cancer Paternal Grandmother      Cerebrovascular Disease Father 53     Myocardial Infarction No family hx of      Coronary Artery Disease Early Onset No family hx of      Ovarian Cancer No family hx of      Colon Cancer No family hx of        Prior to Admission Medications   Prior to Admission Medications   Prescriptions Last Dose Informant Patient Reported? Taking?   Cholecalciferol (VITAMIN D) 50 MCG (2000 UT) CAPS  Self Yes No   Sig: Take 2,000 Units by mouth daily    Prenatal Vit-Fe Fumarate-FA (PNV PRENATAL PLUS MULTIVITAMIN) 27-1 MG TABS per tablet   Yes No   Sig: Take 1 tablet by mouth daily   albuterol (PROAIR HFA/PROVENTIL HFA/VENTOLIN HFA) 108 (90 Base) MCG/ACT inhaler   Yes No   Sig: Inhale 2 puffs into the lungs as needed for shortness of breath / dyspnea or wheezing   busPIRone (BUSPAR) 15 MG tablet   No No   Sig: Take 1 tablet (15 mg) by mouth 2 times daily   cloNIDine (CATAPRES) 0.1 MG tablet  Self Yes No   Sig: Take 0.1 mg by mouth 2 times daily    desvenlafaxine (PRISTIQ) 100 MG 24 hr tablet  Self Yes No   Sig: Take 1 tablet (100 mg) by mouth every morning   docosanol (ABREVA) 10 % CREA cream   Yes No   Sig: Apply to lip 5 times a day as soon as symptoms begin, do not use for more than 10 days. Used PRN.   hydroxychloroquine (PLAQUENIL) 200 MG tablet   Yes No   Sig: Take 200 mg by mouth 2 times daily   lurasidone (LATUDA) 60 MG TABS tablet  Self Yes No   Sig: Take 60 mg by mouth daily (with dinner)    metFORMIN (GLUCOPHAGE-XR) 500 MG 24 hr tablet  Self Yes No   Sig: Take 1,000 mg by mouth Take 1 tablet (500 mg)  by mouth daily    norethindrone (MICRONOR) 0.35 MG tablet   Yes No   Sig: Take 0.35 mg by mouth daily   pantoprazole (PROTONIX) 40 MG EC tablet   No No   Sig: Take 1 tablet (40 mg) by mouth daily   pregabalin (LYRICA) 50 MG capsule   Yes No   Sig: Take 50 mg by mouth 3 times daily   valACYclovir (VALTREX) 1000 mg tablet   Yes No   Sig: Take 1,000 mg by mouth Take 2 tablets by mouth two times daily for one day. Use as needed at onset of cold sore.      Facility-Administered Medications: None     Allergies   Allergies   Allergen Reactions     Amoxicillin-Pot Clavulanate Other (See Comments), Swelling and Rash     PN: facial swelling, left side. Also had cortisone injection the same day as she started the Augmentin.  Noted in downtime recovery of chart.    PN: facial swelling, left side. Also had cortisone injection the same day as she started the Augmentin.; HUT Comment: PN: facial swelling, left side. Also had cortisone injection the same day as she started the Augmentin.Noted in downtime recovery of chart.; HUT Reaction: Rash; HUT Reaction: Unknown; HUT Reaction Type: Allergy; HUT Severity: Med; HUT Noted: [id number]     Oseltamivir Hives     med stopped, PN: med stopped  med stopped, PN: med stopped; HUT Comment: med stopped, PN: med stopped; HUT Reaction: Hives; HUT Reaction Type: Allergy; HUT Severity: Med; HUT Noted: [id number]     Penicillins Anaphylaxis     HUT Reaction: Anaphylaxis; HUT Reaction Type: Allergy; HUT Severity: High; HUT Noted: [id number]     Vancomycin Itching, Swelling and Rash     Other reaction(s): Redness  Flushed face, very itchy; HUT Comment: Flushed face, very itchy; HUT Reaction: Angioedema; HUT Reaction: Redness; HUT Severity: Med; HUT Noted: [id number]    facial     Hydrocodone Nausea and Vomiting and GI Disturbance     vomiting for days, PN: vomiting for days; HUT Comment: vomiting for days; HUT Reaction: Gastrointestinal; HUT Reaction: Nausea And Vomiting; HUT Reaction Type: Intolerance; HUT  Severity: Med; HUT Noted: [id number]  vomiting for days       Blood-Group Specific Substance Other (See Comments)     Patient has an anti-Cw and non-specific antibodies. Blood product orders may be delayed. Draw one red top and two purple top tubes for all type/screen/crossmatch orders.  Patient has anti-Cw, anti-K (Angella), Warm auto and nonspecific antibodies. Blood products may be delayed. Draw patient 24 hours prior to transfusion. Draw one red top and two purple top tubes for all type and screen orders.     Cephalosporins Rash     Influenza Vaccines Other (See Comments) and Nausea and Vomiting     HUT Reaction: Nausea And Vomiting; HUT Reaction Type: Intolerance; HUT Severity: Low; HUT Noted: [id number]     Lamotrigine Rash     Possibly associated with Lamictal.   HUT Comment: Possibly associated with Lamictal. ; HUT Reaction: Rash; HUT Reaction Type: Allergy; HUT Severity: Low; HUT Noted: [id number]     Latex Rash     HUT Reaction: Rash; HUT Reaction Type: Allergy; HUT Severity: Low; HUT Noted: [id number]       Physical Exam   Vital Signs: Temp: 98.1  F (36.7  C) Temp src: Oral BP: (!) 140/89 Pulse: 102     SpO2: 94 %      Weight: 268 lbs 0 oz    Constitutional: awake, alert, cooperative, no apparent distress, and appears stated age  Eyes: Lids and lashes normal, pupils equal, round and reactive to light, extra ocular muscles intact, sclera clear, conjunctiva normal  ENT: Normocephalic, without obvious abnormality, atraumatic, sinuses nontender on palpation, external ears without lesions, oral pharynx with moist mucous membranes, tonsils without erythema or exudates, gums normal and good dentition.  Hematologic / Lymphatic: no cervical lymphadenopathy  Respiratory: No increased work of breathing, good air exchange, clear to auscultation bilaterally, no crackles or wheezing  Cardiovascular: Normal apical impulse, regular rate and rhythm, normal S1 and S2, no S3 or S4, and no murmur noted  GI: No scars, normal bowel  sounds, soft, non-distended, generalized abdominal tenderness mostly RUQ and epigastric region, no masses palpated, no hepatosplenomegally  Skin: no bruising or bleeding  Musculoskeletal: There is no redness, warmth, or swelling of the joints.  Full range of motion noted.  Motor strength is 5 out of 5 all extremities bilaterally.  Tone is normal.  Neurologic: Awake, alert, oriented to name, place and time.  Cranial nerves II-XII are grossly intact.  Motor is 5 out of 5 bilaterally.  Cerebellar finger to nose, heel to shin intact.  Sensory is intact.  Babinski down going, Romberg negative, and gait is normal.  Neuropsychiatric: General: normal, calm and normal eye contact    Data   Data reviewed today: I reviewed all medications, new labs and imaging results over the last 24 hours.  Recent Labs   Lab 10/30/20  1956   WBC 9.7   HGB 11.0*   MCV 86      INR 1.06      POTASSIUM 3.7   CHLORIDE 105   CO2 27   BUN 7   CR 0.56   ANIONGAP 6   KELBY 9.4   *   ALBUMIN 3.4   PROTTOTAL 7.5   BILITOTAL 0.2   ALKPHOS 80   ALT 40   AST 22   LIPASE 75     Most Recent 3 CBC's:  Recent Labs   Lab Test 10/30/20  1956 10/11/20  1215 09/18/20  1808   WBC 9.7 5.9 11.0   HGB 11.0* 12.7 12.2   MCV 86 90 88    203 301     Most Recent 3 BMP's:  Recent Labs   Lab Test 10/30/20  1956 10/11/20  1215 09/18/20  1738    139 138   POTASSIUM 3.7 4.1 3.9   CHLORIDE 105 107 105   CO2 27 26 25   BUN 7 10 11   CR 0.56 0.58 0.55   ANIONGAP 6 7 7   KELBY 9.4 9.4 9.0   * 117* 97     Most Recent 2 LFT's:  Recent Labs   Lab Test 10/30/20  1956 10/11/20  1215   AST 22 35   ALT 40 35   ALKPHOS 80 87   BILITOTAL 0.2 0.2     Most Recent 3 INR's:  Recent Labs   Lab Test 10/30/20  1956 09/07/20  1530 09/05/20  1521   INR 1.06 1.04 1.06     Most Recent 3 Creatinines:  Recent Labs   Lab Test 10/30/20  1956 10/11/20  1215 09/18/20  1738   CR 0.56 0.58 0.55     Most Recent 3 Hemoglobins:  Recent Labs   Lab Test 10/30/20  1956  10/11/20  1215 09/18/20  1808   HGB 11.0* 12.7 12.2     Most Recent ESR & CRP:  Recent Labs   Lab Test 10/30/20  1956 10/11/20  1215   SED  --  49*   CRP 27.0* 23.0*     Most Recent Anemia Panel:  Recent Labs   Lab Test 10/30/20  1956 03/21/18  0802 03/21/18  0802   WBC 9.7   < >  --    HGB 11.0*   < >  --    HCT 34.9*   < > 33.7*   MCV 86   < >  --       < >  --    B12  --   --  279   FOLIC  --   --  18.7    < > = values in this interval not displayed.     Recent Results (from the past 24 hour(s))   US Abdomen Limited (RUQ)    Narrative    EXAMINATION: Limited Abdominal Ultrasound, 10/30/2020 8:00 PM     COMPARISON: None.    HISTORY: Right upper quadrant pain    FINDINGS:  Exam was slightly difficult to perform due to patient's body habitus.     Fluid: No evidence of ascites or pleural effusions.    Liver: The liver demonstrates possibly increased echogenicity,  measuring 12.7 cm in craniocaudal dimension. There is no gross focal  mass.     Gallbladder: Gallbladder is nondistended and appears to contain some  shadowing debris. There is no wall thickening, pericholecystic fluid,  positive sonographic Shepard's sign or evidence for cholelithiasis.    Bile Ducts: Both the intra- and extrahepatic biliary system are of  normal caliber.  The common bile duct measures 2.4 mm in diameter.    Pancreas: Visualized portions of the head and body of the pancreas are  unremarkable.     Kidney: The right kidney measures 9.1 cm long. There is no  hydronephrosis or hydroureter, no shadowing renal calculi, cystic  lesion or mass.       Impression    IMPRESSION:   The gallbladder is contracted and contains some shadowing debris. No  secondary signs of cholecystitis. Possible hepatic steatosis versus  artifact due to overlying body wall. Limited evaluation of the hepatic  parenchyma. Remainder of the exam is unremarkable.    I have personally reviewed the examination and initial interpretation  and I agree with the  findings.    ROLANDA MCCLAIN MD   XR Abdomen 2 Views    Narrative    Exam: XR ABDOMEN 2 VW, 10/30/2020 8:28 PM    Indication: abdominal pain    Comparison: Same-day abdominal ultrasound, abdominal radiographs  9/18/2020, CT chest abdomen pelvis 12/17/2019, chest radiograph  10/11/2020    Findings:   Upright and supine AP views of the abdomen. Multiple loops of mildly  gas distended bowel. No pneumatosis, portal venous gas or evidence of  free air. There are some calcifications in the right upper quadrant  which may correlate with gallstones present on prior CT and  ultrasound. Mild elevation of the right hemidiaphragm similar to prior  exams. No focal consolidation in the visualized portion of the lower  lungs. Degenerative changes of the spine with an S-shaped scoliotic  curvature. No acute osseous pathology. Pelvic phleboliths.      Impression    Impression:   Nonobstructive bowel gas pattern.    I have personally reviewed the examination and initial interpretation  and I agree with the findings.    ROLANDA MCCLAIN MD

## 2020-10-31 NOTE — PROGRESS NOTES
Care Management Note    Length of Stay (days) 0 - still remains in the ED    Patient plan of care discussed at Interdisciplinary Rounds: N/A  Expected Discharge Date:  TBD - pending HIDA scan and possible cholecystectomy v. Outpatient follow-up   Concerns to be Addressed: SW met with Pt today to complete MOON r/t Observation status    Anticipated Discharge Disposition:  TBD  Anticipated Discharge Services:  TBD  Anticipated Discharge DME:  TBD    Plan:  SW met with Pt at bedside to review JETT. Pt was understanding of Observation status, reported no concerns and signed the document.    While completing this, Pt did mention she has a Legal Guardian. She reports that this is Janie Martell (PH: [phone]) and that she would like this updated on her chart. No paperwork documenting this is on file. With Pt's permission, SW did leave a VM for Janie today at [phone] asking for a call back and a copy of legal guardianship paperwork.     For weekend social work needs, contact information below and available on [facility]:  4A, 4C, 4E, 5A, 5B  pager [phone]  6A, 6B, 6C   pager [phone]  7A, 7B, 7C, 7D, 5C  pager [phone]  ED/OBS   pager [phone]    For weekend RN care coordinator needs (home discharge with needs including home care, assisted living facility returns, durable medical equip, IV antibiotics) - page [phone].    MYLES Daniels, [facility]  Weekend  - ED/OBS 10/31/2020  [facility]  ED Pager [phone]: [phone]  After Hours Pager [phone]: [phone]

## 2020-10-31 NOTE — DISCHARGE SUMMARY
Discharge Summary    Nevin Alvarado MRN# [id number]   YOB: 1990 Age: 29 year old     Date of Admission:  10/30/2020  Date of Discharge:  10/31/2020  6:25 PM  Admitting Physician:  SAROJ OCHOA  Discharge Physician:  SAROJ OCHOA  Discharging Service:  Emergency Department Observation Unit     Primary Provider: Latonya Knight          Discharge Diagnosis:   RUQ abdominal pain  Nausea and vomiting              Discharge Disposition:   Discharged to home           Condition on Discharge:   Discharge condition: Stable   Code status on discharge: Full Code           Procedures:   HIDA scan          Discharge Medications:     Current Discharge Medication List      CONTINUE these medications which have NOT CHANGED    Details   albuterol (PROAIR HFA/PROVENTIL HFA/VENTOLIN HFA) 108 (90 Base) MCG/ACT inhaler Inhale 2 puffs into the lungs as needed for shortness of breath / dyspnea or wheezing    Comments: Pharmacy may dispense brand covered by insurance (Proair, or proventil or ventolin or generic albuterol inhaler)      busPIRone (BUSPAR) 15 MG tablet Take 1 tablet (15 mg) by mouth 2 times daily  Qty:      Associated Diagnoses: Anxiety disorder, unspecified type      Cholecalciferol (VITAMIN D) 50 MCG (2000 UT) CAPS Take 2,000 Units by mouth daily       cloNIDine (CATAPRES) 0.1 MG tablet Take 0.1 mg by mouth 2 times daily       desvenlafaxine (PRISTIQ) 100 MG 24 hr tablet Take 1 tablet (100 mg) by mouth every morning      docosanol (ABREVA) 10 % CREA cream Apply to lip 5 times a day as soon as symptoms begin, do not use for more than 10 days. Used PRN.      hydroxychloroquine (PLAQUENIL) 200 MG tablet Take 200 mg by mouth 2 times daily      lurasidone (LATUDA) 60 MG TABS tablet Take 60 mg by mouth daily (with dinner)       metFORMIN (GLUCOPHAGE-XR) 500 MG 24 hr tablet Take 1,000 mg by mouth daily (with dinner) Take 1 tablet (500 mg) by mouth daily       pregabalin (LYRICA) 50 MG capsule Take 50 mg  by mouth 3 times daily      Prenatal Vit-Fe Fumarate-FA (PNV PRENATAL PLUS MULTIVITAMIN) 27-1 MG TABS per tablet Take 1 tablet by mouth daily      valACYclovir (VALTREX) 1000 mg tablet Take 1,000 mg by mouth Take 2 tablets by mouth two times daily for one day. Use as needed at onset of cold sore.                   Consultations:   Consultation during this admission received from surgery             Brief History of Illness:   Nevin Alvarado is a 29 year old female admitted on 10/30/2020. She with a history of ADD, MDD, CANDICE, BPD, PTSD, anorexia nervosa, obesity, CIDP (chronic inflammatory demyelinating polyneuropathy), provoked PE (no longer on anticoagulation), frequent admissions for foreign body ingestion, esophageal perforation s/p esophageal stent (10/9/19) who presented to the ED with abdominal pain.           Hospital Course:   ##. RUQ Abdominal Pain: worsening RUQ abdominal pain for the last 5 days. Pain is sharp and constant mostly in the RUQ and epigastric area wrapping around to her back and also some pain in the suprapubic area; intermittent sharp pain in the left side. Associated nausea, episode of non bloody emesis. Reports white stools. Otherwise denies fever, chills, URI symptoms, diarrhea, melena, hematochezia. In ED, HR , -153/65-89, SaO2 94-96% on RA, Temp 98.1. Normal CMP, lactic acid, lipase, INR. CBC with H/H 11/34.9 otherwise normal. CRP 27. COVID19 negative. Abdominal US reports gallbladder is contracted and contains some shadowing debris, no secondary signs of cholecystitis; possible hepatic steatosis versus artifact due to overlying body wall. Strangely she had an US abdomen at [facility] yesterday which makes not mention of gallstones. AXR reports non obstructive bowel gas pattern and that at [facility] on 10/29/20 reports a 1.2cm indeterminate radiopacity projecting over the RUQ. CT abdomen w/o contrast at [facility] on 10/30.20 reported interval development of partial mural  "calcification of the gallbladder fundus. General surgery was consulted. Agreed with HIDA scan for further assesement. HIDA scan showed normal hepatobiliary scan without evidence for acute or chronic cholecystitis. Normal gallbladder ejection fraction calculated at 75%. Per surgery, no surgical intervention indicated at this time given normal HIDA scan. Patient can follow up outpatient with Dr. Brice Vail. Patient feeling better after pain medication and IVF hydration. She was able to tolerate po. She is afebrile. Lab work shows no indication of infection process. At this point, patient is HD stable and can discharge home with plans to follow up with general surgery. Can follow up with her PCP as needed.      ##. Suicidal Thoughts: Patient states upon arrival to this place, it triggered this feeling however has improved over time being here. No suicidal or homicidal thoughts present.     Chronic Medical problems:   ##. PTSD, BPD, ADD: - Continue PTA buspirone, desvenlafaxine, hydroxyzine, lurasidone, pregabalin  ##. H/o granuloma annulare: - Continue PTA plaquenil  ##. Metabolic syndrome: - Hold metformin for any potential contrast studies               Final Day of Progress before Discharge:       Physical Exam:  Blood pressure (!) 139/102, pulse 117, temperature 98.4  F (36.9  C), temperature source Oral, height 1.575 m (5' 2\"), weight 121.6 kg (268 lb), SpO2 93 %, not currently breastfeeding.    EXAM:  Physical Exam   Constitutional: Pt is oriented to person, place, and time.Pt appears well-developed and well-nourished.   HENT:   Head: Normocephalic and atraumatic.   Eyes: Conjunctivae are normal. Pupils are equal, round, and reactive to light.   Neck: Normal range of motion. Neck supple.   Cardiovascular: Normal rate, regular rhythm, normal heart sounds and intact distal pulses.    Pulmonary/Chest: Effort normal and breath sounds normal. No respiratory distress. Pt has no wheezes. Pt has no rales  Abdominal: " Soft. Bowel sounds are normal. Pt exhibits no distension and no mass. No tenderness. Pt has no rebound and no guarding.   Musculoskeletal: Normal range of motion. Pt exhibits no edema.   Neurological: Pt is alert and oriented to person, place, and time. Normal reflexes.   Skin: Skin is warm and dry. No rash noted.   Psychiatric: Pt has a normal mood and affect. Behavior is normal. Judgment and thought content normal.             Data:  All laboratory data reviewed             Significant Results:   None  Results for orders placed or performed during the hospital encounter of 10/30/20   US Abdomen Limited (RUQ)     Status: None    Narrative    EXAMINATION: Limited Abdominal Ultrasound, 10/30/2020 8:00 PM     COMPARISON: None.    HISTORY: Right upper quadrant pain    FINDINGS:  Exam was slightly difficult to perform due to patient's body habitus.     Fluid: No evidence of ascites or pleural effusions.    Liver: The liver demonstrates possibly increased echogenicity,  measuring 12.7 cm in craniocaudal dimension. There is no gross focal  mass.     Gallbladder: Gallbladder is nondistended and appears to contain some  shadowing debris. There is no wall thickening, pericholecystic fluid,  positive sonographic Shepard's sign or evidence for cholelithiasis.    Bile Ducts: Both the intra- and extrahepatic biliary system are of  normal caliber.  The common bile duct measures 2.4 mm in diameter.    Pancreas: Visualized portions of the head and body of the pancreas are  unremarkable.     Kidney: The right kidney measures 9.1 cm long. There is no  hydronephrosis or hydroureter, no shadowing renal calculi, cystic  lesion or mass.       Impression    IMPRESSION:   The gallbladder is contracted and contains some shadowing debris. No  secondary signs of cholecystitis. Possible hepatic steatosis versus  artifact due to overlying body wall. Limited evaluation of the hepatic  parenchyma. Remainder of the exam is unremarkable.    I have  personally reviewed the examination and initial interpretation  and I agree with the findings.    ROLANDA MCCLAIN MD   XR Abdomen 2 Views     Status: None    Narrative    Exam: XR ABDOMEN 2 VW, 10/30/2020 8:28 PM    Indication: abdominal pain    Comparison: Same-day abdominal ultrasound, abdominal radiographs  9/18/2020, CT chest abdomen pelvis 12/17/2019, chest radiograph  10/11/2020    Findings:   Upright and supine AP views of the abdomen. Multiple loops of mildly  gas distended bowel. No pneumatosis, portal venous gas or evidence of  free air. There are some calcifications in the right upper quadrant  which may correlate with gallstones present on prior CT and  ultrasound. Mild elevation of the right hemidiaphragm similar to prior  exams. No focal consolidation in the visualized portion of the lower  lungs. Degenerative changes of the spine with an S-shaped scoliotic  curvature. No acute osseous pathology. Pelvic phleboliths.      Impression    Impression:   Nonobstructive bowel gas pattern.    I have personally reviewed the examination and initial interpretation  and I agree with the findings.    ROLANDA MCCLAIN MD   NM Hepatobiliary Scan w GB EF     Status: None    Narrative    Examination:  TEMPORARY      Date: 10/31/2020 2:18 PM.    Indication:   Cholelithiasis, nausea, vomiting, right upper quadrant  pain, no fever, no white blood cell count, ultrasound shows gallstones  only     COMPARISON: 10/30/2020 ultrasound    Additional Information: none    Technique:    The patient received 5.50 mCi of Tc-99m Choletec intravenously. Images  were obtained out through 60 minutes.   At 60 minutes the patient  received [Amt of CCK] mcg of CCK over [Infusion Time] minutes. An  additional 45 minutes of images were obtained after the gall bladder  contraction intervention.    Findings:    There is prompt clearance of the radionuclide from the blood pool into  the liver. By 10 minutes there is clear visualization of  the  intrahepatic ducts as well as the upper common bile duct. By 15  minutes there is visualization of the gallbladder. At 60 minutes there  is emptying from the common bile duct into the small bowel.    After CCK administration the gallbladder ejection fraction was  measured at 75%.  The normal gallbladder EF based on a 45 minute  infusion is >40%.    Enterogastric reflux was not present.      Impression    Impression:    Normal hepatobiliary scan without evidence for acute or chronic  cholecystitis. Normal gallbladder ejection fraction calculated at 75%.    =======================    The normal Gall Bladder ejection fraction for a 45 minute infusion is  >40%    I have personally reviewed the examination and initial interpretation  and I agree with the findings.    LEXII CHAVIRA MD   CBC with platelets differential     Status: Abnormal   Result Value Ref Range    WBC 9.7 4.0 - 11.0 10e9/L    RBC Count 4.04 3.8 - 5.2 10e12/L    Hemoglobin 11.0 (L) 11.7 - 15.7 g/dL    Hematocrit 34.9 (L) 35.0 - 47.0 %    MCV 86 78 - 100 fl    MCH 27.2 26.5 - 33.0 pg    MCHC 31.5 31.5 - 36.5 g/dL    RDW 13.6 10.0 - 15.0 %    Platelet Count 293 150 - 450 10e9/L    Diff Method Automated Method     % Neutrophils 73.5 %    % Lymphocytes 18.7 %    % Monocytes 6.1 %    % Eosinophils 1.0 %    % Basophils 0.4 %    % Immature Granulocytes 0.3 %    Nucleated RBCs 0 0 /100    Absolute Neutrophil 7.1 1.6 - 8.3 10e9/L    Absolute Lymphocytes 1.8 0.8 - 5.3 10e9/L    Absolute Monocytes 0.6 0.0 - 1.3 10e9/L    Absolute Eosinophils 0.1 0.0 - 0.7 10e9/L    Absolute Basophils 0.0 0.0 - 0.2 10e9/L    Abs Immature Granulocytes 0.0 0 - 0.4 10e9/L    Absolute Nucleated RBC 0.0    Comprehensive metabolic panel     Status: Abnormal   Result Value Ref Range    Sodium 138 133 - 144 mmol/L    Potassium 3.7 3.4 - 5.3 mmol/L    Chloride 105 94 - 109 mmol/L    Carbon Dioxide 27 20 - 32 mmol/L    Anion Gap 6 3 - 14 mmol/L    Glucose 100 (H) 70 - 99 mg/dL     Urea Nitrogen 7 7 - 30 mg/dL    Creatinine 0.56 0.52 - 1.04 mg/dL    GFR Estimate >90 >60 mL/min/[1.73_m2]    GFR Estimate If Black >90 >60 mL/min/[1.73_m2]    Calcium 9.4 8.5 - 10.1 mg/dL    Bilirubin Total 0.2 0.2 - 1.3 mg/dL    Albumin 3.4 3.4 - 5.0 g/dL    Protein Total 7.5 6.8 - 8.8 g/dL    Alkaline Phosphatase 80 40 - 150 U/L    ALT 40 0 - 50 U/L    AST 22 0 - 45 U/L   Lipase     Status: None   Result Value Ref Range    Lipase 75 73 - 393 U/L   INR     Status: None   Result Value Ref Range    INR 1.06 0.86 - 1.14   CRP inflammation     Status: Abnormal   Result Value Ref Range    CRP Inflammation 27.0 (H) 0.0 - 8.0 mg/L   Lactic acid whole blood     Status: None   Result Value Ref Range    Lactic Acid 1.2 0.7 - 2.0 mmol/L   Asymptomatic COVID-19 Virus (Coronavirus) by PCR     Status: None    Specimen: Nasopharyngeal   Result Value Ref Range    COVID-19 Virus PCR to [facility] - Source Nasopharyngeal     COVID-19 Virus PCR to [facility] - Result       Test received-See reflex to IDDL test SARS CoV2 (COVID-19) Virus RT-PCR   UA with Microscopic reflex to Culture     Status: Abnormal    Specimen: Urine Midstream; Midstream Urine   Result Value Ref Range    Color Urine Yellow     Appearance Urine Clear     Glucose Urine Negative NEG^Negative mg/dL    Bilirubin Urine Negative NEG^Negative    Ketones Urine Negative NEG^Negative mg/dL    Specific Gravity Urine 1.018 1.003 - 1.035    Blood Urine Negative NEG^Negative    pH Urine 5.0 5.0 - 7.0 pH    Protein Albumin Urine 10 (A) NEG^Negative mg/dL    Urobilinogen mg/dL Normal 0.0 - 2.0 mg/dL    Nitrite Urine Negative NEG^Negative    Leukocyte Esterase Urine Negative NEG^Negative    Source Midstream Urine     WBC Urine 0 0 - 5 /HPF    RBC Urine 1 0 - 2 /HPF    Squamous Epithelial /HPF Urine <1 0 - 1 /HPF    Mucous Urine Present (A) NEG^Negative /LPF    Hyaline Casts 7 (H) 0 - 2 /LPF   HCG qualitative urine     Status: None   Result Value Ref Range    HCG Qual Urine Negative  NEG^Negative   SARS-CoV-2 COVID-19 Virus (Coronavirus) RT-PCR Nasopharyngeal     Status: None    Specimen: Nasopharyngeal   Result Value Ref Range    SARS-CoV-2 Virus Specimen Source Nasopharyngeal     SARS-CoV-2 PCR Result NEGATIVE     SARS-CoV-2 PCR Comment       Testing was performed using the Xpert Xpress SARS-CoV-2 Assay on the Cepheid Gene-Xpert   Instrument Systems. Additional information about this Emergency Use Authorization (EUA)   assay can be found via the Lab Guide.     Comprehensive metabolic panel     Status: Abnormal   Result Value Ref Range    Sodium 141 133 - 144 mmol/L    Potassium 3.9 3.4 - 5.3 mmol/L    Chloride 110 (H) 94 - 109 mmol/L    Carbon Dioxide 27 20 - 32 mmol/L    Anion Gap 4 3 - 14 mmol/L    Glucose 100 (H) 70 - 99 mg/dL    Urea Nitrogen 8 7 - 30 mg/dL    Creatinine 0.66 0.52 - 1.04 mg/dL    GFR Estimate >90 >60 mL/min/[1.73_m2]    GFR Estimate If Black >90 >60 mL/min/[1.73_m2]    Calcium 8.4 (L) 8.5 - 10.1 mg/dL    Bilirubin Total 0.3 0.2 - 1.3 mg/dL    Albumin 3.1 (L) 3.4 - 5.0 g/dL    Protein Total 6.7 (L) 6.8 - 8.8 g/dL    Alkaline Phosphatase 76 40 - 150 U/L    ALT 35 0 - 50 U/L    AST 28 0 - 45 U/L   CBC with platelets differential     Status: Abnormal   Result Value Ref Range    WBC 5.2 4.0 - 11.0 10e9/L    RBC Count 3.80 3.8 - 5.2 10e12/L    Hemoglobin 10.2 (L) 11.7 - 15.7 g/dL    Hematocrit 34.0 (L) 35.0 - 47.0 %    MCV 90 78 - 100 fl    MCH 26.8 26.5 - 33.0 pg    MCHC 30.0 (L) 31.5 - 36.5 g/dL    RDW 13.7 10.0 - 15.0 %    Platelet Count 256 150 - 450 10e9/L    Diff Method Automated Method     % Neutrophils 56.4 %    % Lymphocytes 30.8 %    % Monocytes 10.1 %    % Eosinophils 1.9 %    % Basophils 0.6 %    % Immature Granulocytes 0.2 %    Nucleated RBCs 0 0 /100    Absolute Neutrophil 2.9 1.6 - 8.3 10e9/L    Absolute Lymphocytes 1.6 0.8 - 5.3 10e9/L    Absolute Monocytes 0.5 0.0 - 1.3 10e9/L    Absolute Eosinophils 0.1 0.0 - 0.7 10e9/L    Absolute Basophils 0.0 0.0 - 0.2  "10e9/L    Abs Immature Granulocytes 0.0 0 - 0.4 10e9/L    Absolute Nucleated RBC 0.0    Magnesium     Status: None   Result Value Ref Range    Magnesium 2.0 1.6 - 2.3 mg/dL   CRP inflammation     Status: Abnormal   Result Value Ref Range    CRP Inflammation 26.0 (H) 0.0 - 8.0 mg/L   Lipase     Status: None   Result Value Ref Range    Lipase 98 73 - 393 U/L   Surgery General Adult IP Consult: Patient to be seen: Routine within 24 hrs; Call back #: [id number]; RUQ pain, nausea vomiting, worse with eating. normal labs. US with gallbladder debris. Obtaining HIDA scan today. Recommendations appreciated; Consult...     Status: None ()    Nevin Rodas MD     10/31/2020 12:59 PM  General Surgery Consultation    Nevin Alvarado MRN# [id number]   Age: 29 year old YOB: 1990     Date of Admission:  10/30/2020    Date of Consult:   10/31/2020    Reason for consult: Abdominal pain, right upper quadrant       Requesting service: ED observation; requesting provider: Tyson Richardson PA-C                   Assessment and Plan:   Assessment:   Nevin Alvarado is a 29 year old woman with history of   depression, anxiety, self-harm, PTSD, anorexia with bullemia,   obesity, and neuropathy who presents with 4 day history of RUQ,   epigastric and back pain associated with nausea found to have   cholelithiasis on ultrasound.         Plan:    - Will discuss cholecystectomy v. Outpatient follow-up    - Agree with HIDA   - Dispo pending HIDA      Discussed with chief resident, Aminta Khan who discussed with   staff, Dr. Yared Womack MD  PGY-2 General Surgery  [facility]  General Surgery Service            Chief Complaint:   \"Gallstones\"         History of Present Illness:   Nevin Alvarado is a 29 year old woman who presented to the   ED with 4 day history of RUQ, epigastric and back pain that   worsens with food, is otherwise constant without palliation, and " "  associated with nausea. No emesis. She has had diarrhea for the   past month, but more recently has had soft, formed, \"white\"   stools without hematochezia nor melena. She has daily acholic BM.   She has been voiding normal volumes without issue. She notes   darkening of her urine. No recent fevers or chills. No recent   sick contacts.               Past Medical History:     Past Medical History:   Diagnosis Date     ADD (attention deficit disorder)      Anorexia nervosa with bulimia     history of; on Topamax     Anxiety      Borderline personality disorder (H)      Depression      H/O self-harm      History of pulmonary embolism 12/2019    Provoked. Completed 3 month course of Apixaban     Morbid obesity (H)      Neuropathy      PTSD (post-traumatic stress disorder)      Rectal foreign body - Recurrent issue, self placed      Suicide attempt (H) 2/21/2018     Swallowed foreign body - Recurrent issue, self placed    No personal history of issues with anesthesia          Past Surgical History:     Past Surgical History:   Procedure Laterality Date     COMBINED ESOPHAGOSCOPY, GASTROSCOPY, DUODENOSCOPY (EGD),   REPLACE ESOPHAGEAL STENT N/A 10/9/2019    Procedure: Upper Endoscopy with Suture Placement;  Surgeon:   Zurdo Ramirez MD;  Location: [facility]     ESOPHAGOSCOPY, GASTROSCOPY, DUODENOSCOPY (EGD), COMBINED N/A   3/9/2017    Procedure: COMBINED ESOPHAGOSCOPY, GASTROSCOPY, DUODENOSCOPY   (EGD), REMOVE FOREIGN BODY;  Surgeon: Avis Guzmán MD;  Location: [facility]     ESOPHAGOSCOPY, GASTROSCOPY, DUODENOSCOPY (EGD), COMBINED N/A   4/20/2017    Procedure: COMBINED ESOPHAGOSCOPY, GASTROSCOPY, DUODENOSCOPY   (EGD), REMOVE FOREIGN BODY;  EGD removal Foregin body;  Surgeon:   Lokesh Paula MD;  Location: [facility]     ESOPHAGOSCOPY, GASTROSCOPY, DUODENOSCOPY (EGD), COMBINED N/A   6/12/2017    Procedure: COMBINED ESOPHAGOSCOPY, GASTROSCOPY, DUODENOSCOPY   (EGD);  COMBINED ESOPHAGOSCOPY, GASTROSCOPY, " DUODENOSCOPY (EGD)   [[id number]]attempted removal of foreign body;  Surgeon:   Pamela Perez MD;  Location: [facility]     ESOPHAGOSCOPY, GASTROSCOPY, DUODENOSCOPY (EGD), COMBINED N/A   6/9/2017    Procedure: COMBINED ESOPHAGOSCOPY, GASTROSCOPY, DUODENOSCOPY   (EGD), REMOVE FOREIGN BODY;  Esophagoscopy, Gastroscopy,   Duodenoscopy, Removal of Foreign Body;  Surgeon: Dejon Marsh MD;  Location: [facility]     ESOPHAGOSCOPY, GASTROSCOPY, DUODENOSCOPY (EGD), COMBINED N/A   1/6/2018    Procedure: COMBINED ESOPHAGOSCOPY, GASTROSCOPY, DUODENOSCOPY   (EGD), REMOVE FOREIGN BODY;  COMBINED ESOPHAGOSCOPY, GASTROSCOPY,   DUODENOSCOPY (EGD) [by pascal net and snare with profol sedation;    Surgeon: Feliciano Emmanuel MD;  Location:  GI     ESOPHAGOSCOPY, GASTROSCOPY, DUODENOSCOPY (EGD), COMBINED N/A   3/19/2018    Procedure: COMBINED ESOPHAGOSCOPY, GASTROSCOPY, DUODENOSCOPY   (EGD), REMOVE FOREIGN BODY;   Esophagodscopy, Gastroscopy,   Duodenoscopy,Foreign Body Removal;  Surgeon: Brice Guzmán MD;    Location: [facility]     ESOPHAGOSCOPY, GASTROSCOPY, DUODENOSCOPY (EGD), COMBINED N/A   4/16/2018    Procedure: COMBINED ESOPHAGOSCOPY, GASTROSCOPY, DUODENOSCOPY   (EGD), REMOVE FOREIGN BODY;  Esophagogastroduodenoscopy  Foreign   Body Removal X 2;  Surgeon: Royer Moise MD;    Location: [facility]     ESOPHAGOSCOPY, GASTROSCOPY, DUODENOSCOPY (EGD), COMBINED N/A   6/1/2018    Procedure: COMBINED ESOPHAGOSCOPY, GASTROSCOPY, DUODENOSCOPY   (EGD), REMOVE FOREIGN BODY;  COMBINED ESOPHAGOSCOPY, GASTROSCOPY,   DUODENOSCOPY with removal of foreign body, propofol sedation by   anesthesia;  Surgeon: Brice Martinez MD;  Location: [facility]     ESOPHAGOSCOPY, GASTROSCOPY, DUODENOSCOPY (EGD), COMBINED N/A   7/25/2018    Procedure: COMBINED ESOPHAGOSCOPY, GASTROSCOPY, DUODENOSCOPY   (EGD), REMOVE FOREIGN BODY;;  Surgeon: Candy Castelan MD;  Location:  GI     ESOPHAGOSCOPY, GASTROSCOPY, DUODENOSCOPY  (EGD), COMBINED N/A   7/28/2018    Procedure: COMBINED ESOPHAGOSCOPY, GASTROSCOPY, DUODENOSCOPY   (EGD), REMOVE FOREIGN BODY;  COMBINED ESOPHAGOSCOPY, GASTROSCOPY,   DUODENOSCOPY (EGD), REMOVE FOREIGN BODY;  Surgeon: Brice Guzmán MD;  Location: [facility]     ESOPHAGOSCOPY, GASTROSCOPY, DUODENOSCOPY (EGD), COMBINED N/A   7/31/2018    Procedure: COMBINED ESOPHAGOSCOPY, GASTROSCOPY, DUODENOSCOPY   (EGD);  COMBINED ESOPHAGOSCOPY, GASTROSCOPY, DUODENOSCOPY (EGD)   TO REMOVE FOREIGN BODY;  Surgeon: Lokesh Paula MD;    Location: [facility]     ESOPHAGOSCOPY, GASTROSCOPY, DUODENOSCOPY (EGD), COMBINED N/A   8/4/2018    Procedure: COMBINED ESOPHAGOSCOPY, GASTROSCOPY, DUODENOSCOPY   (EGD), REMOVE FOREIGN BODY;   combined esophagoscopy,   gastroscopy, duodenoscopy, REMOVE FOREIGN BODY ;  Surgeon:   Lokesh Paula MD;  Location: [facility]     ESOPHAGOSCOPY, GASTROSCOPY, DUODENOSCOPY (EGD), COMBINED N/A   10/6/2019    Procedure: ESOPHAGOGASTRODUODENOSCOPY (EGD) with fireign body   removal x2, esophageal stent placement with floroscopy;  Surgeon:   Timoteo Espana MD;  Location: [facility]     ESOPHAGOSCOPY, GASTROSCOPY, DUODENOSCOPY (EGD), COMBINED N/A   12/2/2019    Procedure: Esophagogastroduodenoscopy with esophageal stent   removal, esophogram;  Surgeon: Kailee Tena MD;    Location: [facility]     ESOPHAGOSCOPY, GASTROSCOPY, DUODENOSCOPY (EGD), COMBINED N/A   12/17/2019    Procedure: ESOPHAGOGASTRODUODENOSCOPY, WITH FOREIGN BODY   REMOVAL;  Surgeon: Pamela Perez MD;  Location:   [facility]     ESOPHAGOSCOPY, GASTROSCOPY, DUODENOSCOPY (EGD), COMBINED N/A   12/13/2019    Procedure: ESOPHAGOGASTRODUODENOSCOPY, WITH FOREIGN BODY   REMOVAL;  Surgeon: Samia Stanton MD;  [facility]: [facility]     ESOPHAGOSCOPY, GASTROSCOPY, DUODENOSCOPY (EGD), COMBINED N/A   12/28/2019    Procedure: ESOPHAGOGASTRODUODENOSCOPY (EGD) Removal of Foreign   Body X 2;  Surgeon: Huy Kelley MD;  Location:  OR      ESOPHAGOSCOPY, GASTROSCOPY, DUODENOSCOPY (EGD), COMBINED N/A   1/5/2020    Procedure: ESOPHAGOGASTRODUOENOSCOPY WITH FOREIGN BODY REMOVAL;    Surgeon: Pamela Perez MD;  [facility]: [facility]     ESOPHAGOSCOPY, GASTROSCOPY, DUODENOSCOPY (EGD), COMBINED N/A   1/3/2020    Procedure: ESOPHAGOGASTRODUODENOSCOPY (EGD) REMOVAL OF FOREIGN   BODY.;  Surgeon: Pamela Perez MD;  [facility]     ESOPHAGOSCOPY, GASTROSCOPY, DUODENOSCOPY (EGD), COMBINED N/A   1/13/2020    Procedure: ESOPHAGOGASTRODUODENOSCOPY (EGD) for foreign body   removal;  Surgeon: Lokesh Paula MD;  Location: [facility]     ESOPHAGOSCOPY, GASTROSCOPY, DUODENOSCOPY (EGD), COMBINED N/A   1/18/2020    Procedure: Diagnostic ESOPHAGOGASTRODUODENOSCOPY (EGD;  Surgeon:   Lokesh Paula MD;  [facility]     ESOPHAGOSCOPY, GASTROSCOPY, DUODENOSCOPY (EGD), COMBINED N/A   3/29/2020    Procedure: UPPER ENDOSCOPY WITH FOREIGN BODY REMOVAL;  Surgeon:   Doug Hansen MD;  [facility]     ESOPHAGOSCOPY, GASTROSCOPY, DUODENOSCOPY (EGD), COMBINED N/A   7/11/2020    Procedure: ESOPHAGOGASTRODUODENOSCOPY (EGD); Upper Endoscopy   WITH FOREIGN BODY REMOVAL;  Surgeon: Lyndsey Mendoza DO;    Location: [facility]     ESOPHAGOSCOPY, GASTROSCOPY, DUODENOSCOPY (EGD), COMBINED N/A   7/29/2020    Procedure: ESOPHAGOGASTRODUODENOSCOPY REMOVAL OF FOREIGN BODY;    Surgeon: Samia Stanton MD;  [facility]     ESOPHAGOSCOPY, GASTROSCOPY, DUODENOSCOPY (EGD), COMBINED N/A   8/1/2020    Procedure: ESOPHAGOGASTRODUODENOSCOPY, WITH FOREIGN BODY   REMOVAL;  Surgeon: Pamela Perez MD;  [facility]     ESOPHAGOSCOPY, GASTROSCOPY, DUODENOSCOPY (EGD), COMBINED N/A   8/18/2020    Procedure: ESOPHAGOGASTRODUODENOSCOPY (EGD) for foreign body   removal;  Surgeon: Pamela Perez MD;  [facility]     ESOPHAGOSCOPY, GASTROSCOPY, DUODENOSCOPY (EGD), COMBINED N/A   8/27/2020    Procedure:  ESOPHAGOGASTRODUODENOSCOPY (EGD) with foreign body   removal;  Surgeon: Campbell Rogers MD;  [facility]     ESOPHAGOSCOPY, GASTROSCOPY, DUODENOSCOPY (EGD), COMBINED N/A   9/18/2020    Procedure: ESOPHAGOGASTRODUODENOSCOPY (EGD) with foreign body   removal;  Surgeon: Dick Gillis MD;  [facility]     EXAM UNDER ANESTHESIA ANUS N/A 1/10/2017    Procedure: EXAM UNDER ANESTHESIA ANUS;  Surgeon: Annmarie Haynes MD;  [facility]     EXAM UNDER ANESTHESIA RECTUM N/A 7/19/2018    Procedure: EXAM UNDER ANESTHESIA RECTUM;  EXAM UNDER ANESTHESIA,   REMOVAL OF RECTAL FOREIGN BODY;  Surgeon: Annmarie Haynes MD;  [facility]     HC REMOVE FECAL IMPACTION OR FB W ANESTHESIA N/A 12/18/2016    Procedure: REMOVE FECAL IMPACTION/FOREIGN BODY UNDER ANESTHESIA;    Surgeon: Soham Cano MD;  Location: [facility]     KNEE SURGERY - removed a small tissue mass from knee Right      LAPAROSCOPIC ABLATION ENDOMETRIOSIS       LAPAROTOMY EXPLORATORY N/A 1/10/2017    Procedure: LAPAROTOMY EXPLORATORY;  Surgeon: Annmarie Haynes MD;  [facility]     LAPAROTOMY EXPLORATORY  09/11/2019    Manual manipulation of bowel to remove pill bottle in rectum     lymph nodes removed from neck; benign  age 7     MAMMOPLASTY REDUCTION Bilateral      RELEASE CARPAL TUNNEL Bilateral      SIGMOIDOSCOPY FLEXIBLE N/A 1/10/2017    Procedure: SIGMOIDOSCOPY FLEXIBLE;  Surgeon: Annmarie Haynes MD;  [facility]     SIGMOIDOSCOPY FLEXIBLE N/A 5/8/2018    Procedure: SIGMOIDOSCOPY FLEXIBLE;  flex sig with foreign body   removal using snare and rattooth forcep;  Surgeon: Soham Cano MD;  Location: [facility]     SIGMOIDOSCOPY FLEXIBLE N/A 2/20/2019    Procedure: Exam under anesthesia Colonoscopy with attempted    removal of rectal foreign body;  Surgeon: Estrada Chávez MD;    Location: [facility]             Social History:     Social History     Tobacco Use     Smoking status: Never  Smoker     Smokeless tobacco: Never Used   Substance Use Topics     Alcohol use: No     Alcohol/week: 0.0 standard drinks             Family History:     Family History   Problem Relation Age of Onset     Diabetes Type 2  Maternal Grandmother      Diabetes Type 2  Paternal Grandmother      Breast Cancer Paternal Grandmother      Cerebrovascular Disease Father 53     Myocardial Infarction No family hx of      Coronary Artery Disease Early Onset No family hx of      Ovarian Cancer No family hx of      Colon Cancer No family hx of                 Allergies:     Allergies   Allergen Reactions     Amoxicillin-Pot Clavulanate Other (See Comments), Swelling and   Rash     PN: facial swelling, left side. Also had cortisone injection   the same day as she started the Augmentin.  Noted in downtime recovery of chart.    PN: facial swelling, left side. Also had cortisone injection the   same day as she started the Augmentin.; HUT Comment: PN: facial   swelling, left side. Also had cortisone injection the same day as   she started the Augmentin.Noted in downtime recovery of chart.;   HUT Reaction: Rash; HUT Reaction: Unknown; HUT Reaction Type:   Allergy; HUT Severity: Med; HUT Noted: [id number]     Oseltamivir Hives     med stopped, PN: med stopped  med stopped, PN: med stopped; HUT Comment: med stopped, PN: med   stopped; HUT Reaction: Hives; HUT Reaction Type: Allergy; HUT   Severity: Med; HUT Noted: [id number]     Penicillins Anaphylaxis     HUT Reaction: Anaphylaxis; HUT Reaction Type: Allergy; HUT   Severity: High; HUT Noted: [id number]     Vancomycin Itching, Swelling and Rash     Other reaction(s): Redness  Flushed face, very itchy; HUT Comment: Flushed face, very itchy;   HUT Reaction: Angioedema; HUT Reaction: Redness; HUT Severity:   Med; HUT Noted: [id number]    facial     Hydrocodone Nausea and Vomiting and GI Disturbance     vomiting for days, PN: vomiting for days; HUT Comment: vomiting   for days; HUT Reaction:  Gastrointestinal; HUT Reaction: Nausea   And Vomiting; HUT Reaction Type: Intolerance; HUT Severity: Med;   HUT Noted: [id number]  vomiting for days       Blood-Group Specific Substance Other (See Comments)     Patient has an anti-Cw and non-specific antibodies. Blood   product orders may be delayed. Draw one red top and two purple   top tubes for all type/screen/crossmatch orders.  Patient has anti-Cw, anti-K (Angella), Warm auto and nonspecific   antibodies. Blood products may be delayed. Draw patient 24 hours   prior to transfusion. Draw one red top and two purple top tubes   for all type and screen orders.     Cephalosporins Rash     Influenza Vaccines Other (See Comments) and Nausea and Vomiting       HUT Reaction: Nausea And Vomiting; HUT Reaction Type:   Intolerance; HUT Severity: Low; HUT Noted: [id number]     Lamotrigine Rash     Possibly associated with Lamictal.   HUT Comment: Possibly associated with Lamictal. ; HUT Reaction:   Rash; HUT Reaction Type: Allergy; HUT Severity: Low; HUT Noted:   [id number]     Latex Rash     HUT Reaction: Rash; HUT Reaction Type: Allergy; HUT Severity:   Low; HUT Noted: [id number]             Medications:     Current Facility-Administered Medications   Medication     busPIRone (BUSPAR) tablet 15 mg     cloNIDine (CATAPRES) tablet 0.1 mg     desvenlafaxine (PRISTIQ) 24 hr tablet 100 mg     HYDROmorphone (PF) (DILAUDID) injection 0.5 mg     HYDROmorphone (PF) (DILAUDID) injection 0.5 mg     hydroxychloroquine (PLAQUENIL) tablet 200 mg     lurasidone (LATUDA) tablet 60 mg     metoclopramide (REGLAN) injection 10 mg     pantoprazole (PROTONIX) IV push injection 40 mg     pregabalin (LYRICA) capsule 50 mg     sodium chloride 0.9% infusion     Current Outpatient Medications   Medication Sig     albuterol (PROAIR HFA/PROVENTIL HFA/VENTOLIN HFA) 108 (90 Base)   MCG/ACT inhaler Inhale 2 puffs into the lungs as needed for   shortness of breath / dyspnea or wheezing     busPIRone (BUSPAR) 15 MG  tablet Take 1 tablet (15 mg) by mouth   2 times daily     Cholecalciferol (VITAMIN D) 50 MCG (2000 UT) CAPS Take 2,000   Units by mouth daily      cloNIDine (CATAPRES) 0.1 MG tablet Take 0.1 mg by mouth 2 times   daily      desvenlafaxine (PRISTIQ) 100 MG 24 hr tablet Take 1 tablet (100   mg) by mouth every morning     docosanol (ABREVA) 10 % CREA cream Apply to lip 5 times a day   as soon as symptoms begin, do not use for more than 10 days. Used   PRN.     hydroxychloroquine (PLAQUENIL) 200 MG tablet Take 200 mg by   mouth 2 times daily     lurasidone (LATUDA) 60 MG TABS tablet Take 60 mg by mouth daily   (with dinner)      metFORMIN (GLUCOPHAGE-XR) 500 MG 24 hr tablet Take 1,000 mg by   mouth daily (with dinner) Take 1 tablet (500 mg) by mouth daily      pregabalin (LYRICA) 50 MG capsule Take 50 mg by mouth 3 times   daily     Prenatal Vit-Fe Fumarate-FA (PNV PRENATAL PLUS MULTIVITAMIN)   27-1 MG TABS per tablet Take 1 tablet by mouth daily     valACYclovir (VALTREX) 1000 mg tablet Take 1,000 mg by mouth   Take 2 tablets by mouth two times daily for one day. Use as   needed at onset of cold sore.               Review of Systems:   10-point ROS otherwise negative except as noted above.          Physical Exam:   All vitals have been reviewed    Temp:  [98.1  F (36.7  C)-98.2  F (36.8  C)] 98.2  F (36.8  C)  Pulse:  [] 117  BP: (140-153)/() 143/91  SpO2:  [93 %-96 %] 93 %      No intake or output data in the 24 hours ending 10/31/20 1120      Physical Exam:  Gen: alert, responsive, NAD, comfortable in bed  CV: RRR  Pulm: NLB on RA  Abd: Soft, obese, non-distended, diffusely tender throughout,   negative coleman's, tolerates deep palpation RUQ with minimal   discomfort, no tympany.   Ext: WWP          Data:   All laboratory data reviewed      Results:  [facility]  Recent Labs   Lab 10/31/20  0721 10/30/20  1956    138   POTASSIUM 3.9 3.7   CHLORIDE 110* 105   CO2 27 27   BUN 8 7   CR 0.66 0.56   *  100*     CBC  Recent Labs   Lab 10/31/20  0721 10/30/20  1956   WBC 5.2 9.7   HGB 10.2* 11.0*    293     LFT  Recent Labs   Lab 10/31/20  0721 10/30/20  1956   AST 28 22   ALT 35 40   ALKPHOS 76 80   BILITOTAL 0.3 0.2   ALBUMIN 3.1* 3.4   INR  --  1.06     Recent Labs   Lab 10/31/20  0721 10/30/20  1956   * 100*         Imaging:  Abd US  IMPRESSION:   The gallbladder is contracted and contains some shadowing debris.   No  secondary signs of cholecystitis. Possible hepatic steatosis   versus  artifact due to overlying body wall. Limited evaluation of the   hepatic  parenchyma. Remainder of the exam is unremarkable.         Nevin Womack MD  PGY-2 General Surgery           Recent Results (from the past 48 hour(s))   US Abdomen Limited (RUQ)    Narrative    EXAMINATION: Limited Abdominal Ultrasound, 10/30/2020 8:00 PM     COMPARISON: None.    HISTORY: Right upper quadrant pain    FINDINGS:  Exam was slightly difficult to perform due to patient's body habitus.     Fluid: No evidence of ascites or pleural effusions.    Liver: The liver demonstrates possibly increased echogenicity,  measuring 12.7 cm in craniocaudal dimension. There is no gross focal  mass.     Gallbladder: Gallbladder is nondistended and appears to contain some  shadowing debris. There is no wall thickening, pericholecystic fluid,  positive sonographic Shepard's sign or evidence for cholelithiasis.    Bile Ducts: Both the intra- and extrahepatic biliary system are of  normal caliber.  The common bile duct measures 2.4 mm in diameter.    Pancreas: Visualized portions of the head and body of the pancreas are  unremarkable.     Kidney: The right kidney measures 9.1 cm long. There is no  hydronephrosis or hydroureter, no shadowing renal calculi, cystic  lesion or mass.       Impression    IMPRESSION:   The gallbladder is contracted and contains some shadowing debris. No  secondary signs of cholecystitis. Possible hepatic steatosis  versus  artifact due to overlying body wall. Limited evaluation of the hepatic  parenchyma. Remainder of the exam is unremarkable.    I have personally reviewed the examination and initial interpretation  and I agree with the findings.    ROLANDA MCCLAIN MD   XR Abdomen 2 Views    Narrative    Exam: XR ABDOMEN 2 VW, 10/30/2020 8:28 PM    Indication: abdominal pain    Comparison: Same-day abdominal ultrasound, abdominal radiographs  9/18/2020, CT chest abdomen pelvis 12/17/2019, chest radiograph  10/11/2020    Findings:   Upright and supine AP views of the abdomen. Multiple loops of mildly  gas distended bowel. No pneumatosis, portal venous gas or evidence of  free air. There are some calcifications in the right upper quadrant  which may correlate with gallstones present on prior CT and  ultrasound. Mild elevation of the right hemidiaphragm similar to prior  exams. No focal consolidation in the visualized portion of the lower  lungs. Degenerative changes of the spine with an S-shaped scoliotic  curvature. No acute osseous pathology. Pelvic phleboliths.      Impression    Impression:   Nonobstructive bowel gas pattern.    I have personally reviewed the examination and initial interpretation  and I agree with the findings.    ROLANDA MCCLAIN MD   NM Hepatobiliary Scan w GB EF    Narrative    Examination:  TEMPORARY      Date: 10/31/2020 2:18 PM.    Indication:   Cholelithiasis, nausea, vomiting, right upper quadrant  pain, no fever, no white blood cell count, ultrasound shows gallstones  only     COMPARISON: 10/30/2020 ultrasound    Additional Information: none    Technique:    The patient received 5.50 mCi of Tc-99m Choletec intravenously. Images  were obtained out through 60 minutes.   At 60 minutes the patient  received [Amt of CCK] mcg of CCK over [Infusion Time] minutes. An  additional 45 minutes of images were obtained after the gall bladder  contraction intervention.    Findings:    There is prompt  clearance of the radionuclide from the blood pool into  the liver. By 10 minutes there is clear visualization of the  intrahepatic ducts as well as the upper common bile duct. By 15  minutes there is visualization of the gallbladder. At 60 minutes there  is emptying from the common bile duct into the small bowel.    After CCK administration the gallbladder ejection fraction was  measured at 75%.  The normal gallbladder EF based on a 45 minute  infusion is >40%.    Enterogastric reflux was not present.      Impression    Impression:    Normal hepatobiliary scan without evidence for acute or chronic  cholecystitis. Normal gallbladder ejection fraction calculated at 75%.    =======================    The normal Gall Bladder ejection fraction for a 45 minute infusion is  >40%    I have personally reviewed the examination and initial interpretation  and I agree with the findings.    LEXII CHAVIRA MD                Pending Results:   Unresulted Labs Ordered in the Past 30 Days of this Admission     No orders found for last 31 day(s).                  Discharge Instructions and Follow-Up:     Discharge Procedure Orders   Reason for your hospital stay   Order Comments: Abdominal pain, nausea and vomiting     Follow Up and recommended labs and tests   Order Comments: Follow up with surgery as discussed     Activity   Order Comments: Your activity upon discharge: activity as tolerated     Order Specific Question Answer Comments   Is discharge order? Yes      When to contact your care team   Order Comments: Return to the ED with fever, uncontrolled nausea, vomiting, unrelieved pain,   dizziness, chest pain, shortness of breath, loss of consciousness, and any new or concerning symptoms.     Discharge Instructions   Order Comments: You were admitted for abdominal pain, nausea and vomiting. US of the abdomen was normal. Additionally, you completed a HIDA scan to look at your gallbladder which was normal. You blood work up  was unremarkable. Your case was discussed with surgery. They recommend no surgery at this point. Your symptoms are improving after you were treated with intervenous fluids and anti nausea medications. Drink liquids as directed.  Eat bland foods. When you feel hungry, begin eating soft, bland foods. Examples are bananas, clear soup, potatoes, and applesauce.  Do not have dairy products, alcohol, sugary drinks, or drinks with caffeine until you feel better.  Rest as much as possible. Slowly start to do more each day when you begin to feel better. Follow up with general surgery outpatient as discussed. [phone]     Full Code     Order Specific Question Answer Comments   Code status determined by: Discussion with patient/ legal decision maker      Diet   Order Comments: Follow this diet upon discharge: Regular     Order Specific Question Answer Comments   Is discharge order? Yes           Attestation:  Rachel Brunner PA-C.

## 2020-11-02 ENCOUNTER — TELEPHONE (OUTPATIENT)
Dept: INTERNAL MEDICINE | Facility: CLINIC | Age: 30
End: 2020-11-02

## 2020-11-02 NOTE — TELEPHONE ENCOUNTER
"ED/Discharge Protocol    \"Hi, my name is Rose Marie Huang RN, a registered nurse, and I am calling on behalf of Dr. Ramos's office at [place].  I am calling to follow up and see how things are going for you after your recent visit.\"    \"I see that you were in the (ER/UC/IP) on 10-30-20.    How are you doing now that you are home?\" fine pt has a new PCP and has for a couple of years    Is patient experiencing symptoms that may require a hospital visit?  no    Discharge Instructions    \"Let's review your discharge instructions.  What is/are the follow-up recommendations?  Pt. Response: pt will follow up with current PCP    \"Were you instructed to make a follow-up appointment?\"  Pt. Response: Yes.  Has appointment been made?   Yes      \"When you see the provider, I would recommend that you bring your discharge instructions with you.    Medications    \"How many new medications are you on since your hospitalization/ED visit?\"    0-1  \"How many of your current medicines changed (dose, timing, name, etc.) while you were in the hospital/ED visit?\"   0-1  \"Do you have questions about your medications?\"   No  \"Were you newly diagnosed with heart failure, COPD, diabetes or did you have a heart attack?\"   No  For patients on insulin: \"Did you start on insulin in the hospital or did you have your insulin dose changed?\"   No  Post Discharge Medication Reconciliation Status: unable to reconcile discharge medications due to not our pt.    Was MTM referral placed (*Make sure to put transitions as reason for referral)?   No    Call Summary    \"Do you have any questions or concerns about your condition or care plan at the moment?\"    No  Triage nurse advice given: na    Patient was in ER N/A in the past year (assess appropriateness of ER visits.)      \"If you have questions or things don't continue to improve, we encourage you contact us through the main clinic number,  [phone].  Even if the clinic is not open, triage " "nurses are available 24/7 to help you.     We would like you to know that our clinic has extended hours (provide information).  We also have urgent care (provide details on closest location and hours/contact info)\"      \"Thank you for your time and take care!\"      "

## 2020-11-07 ENCOUNTER — APPOINTMENT (OUTPATIENT)
Dept: GENERAL RADIOLOGY | Facility: CLINIC | Age: 30
End: 2020-11-07
Attending: EMERGENCY MEDICINE
Payer: COMMERCIAL

## 2020-11-07 ENCOUNTER — HOSPITAL ENCOUNTER (EMERGENCY)
Facility: CLINIC | Age: 30
Discharge: HOME | End: 2020-11-07
Attending: EMERGENCY MEDICINE | Admitting: EMERGENCY MEDICINE
Payer: COMMERCIAL

## 2020-11-07 ENCOUNTER — HEALTH MAINTENANCE LETTER (OUTPATIENT)
Age: 30
End: 2020-11-07

## 2020-11-07 ENCOUNTER — APPOINTMENT (OUTPATIENT)
Dept: ULTRASOUND IMAGING | Facility: CLINIC | Age: 30
End: 2020-11-07
Attending: EMERGENCY MEDICINE
Payer: COMMERCIAL

## 2020-11-07 VITALS
SYSTOLIC BLOOD PRESSURE: 135 MMHG | TEMPERATURE: 98.4 F | RESPIRATION RATE: 16 BRPM | HEART RATE: 90 BPM | DIASTOLIC BLOOD PRESSURE: 92 MMHG | OXYGEN SATURATION: 94 %

## 2020-11-07 DIAGNOSIS — R10.9 ABDOMINAL PAIN, UNSPECIFIED ABDOMINAL LOCATION: ICD-10-CM

## 2020-11-07 DIAGNOSIS — G89.18 ACUTE POST-OPERATIVE PAIN: ICD-10-CM

## 2020-11-07 LAB
ALBUMIN SERPL-MCNC: 3.3 G/DL (ref 3.4–5)
ALBUMIN UR-MCNC: NEGATIVE MG/DL
ALP SERPL-CCNC: 76 U/L (ref 40–150)
ALT SERPL W P-5'-P-CCNC: 32 U/L (ref 0–50)
ANION GAP SERPL CALCULATED.3IONS-SCNC: 4 MMOL/L (ref 3–14)
APPEARANCE UR: CLEAR
AST SERPL W P-5'-P-CCNC: 24 U/L (ref 0–45)
BACTERIA #/AREA URNS HPF: ABNORMAL /HPF
BASOPHILS # BLD AUTO: 0.1 10E9/L (ref 0–0.2)
BASOPHILS NFR BLD AUTO: 0.7 %
BILIRUB SERPL-MCNC: 0.2 MG/DL (ref 0.2–1.3)
BILIRUB UR QL STRIP: NEGATIVE
BUN SERPL-MCNC: 11 MG/DL (ref 7–30)
CALCIUM SERPL-MCNC: 9 MG/DL (ref 8.5–10.1)
CHLORIDE SERPL-SCNC: 107 MMOL/L (ref 94–109)
CO2 SERPL-SCNC: 29 MMOL/L (ref 20–32)
COLOR UR AUTO: ABNORMAL
CREAT SERPL-MCNC: 0.62 MG/DL (ref 0.52–1.04)
DIFFERENTIAL METHOD BLD: ABNORMAL
EOSINOPHIL # BLD AUTO: 0.4 10E9/L (ref 0–0.7)
EOSINOPHIL NFR BLD AUTO: 6 %
ERYTHROCYTE [DISTWIDTH] IN BLOOD BY AUTOMATED COUNT: 14.1 % (ref 10–15)
GFR SERPL CREATININE-BSD FRML MDRD: >90 ML/MIN/{1.73_M2}
GLUCOSE SERPL-MCNC: 92 MG/DL (ref 70–99)
GLUCOSE UR STRIP-MCNC: NEGATIVE MG/DL
HCT VFR BLD AUTO: 32.9 % (ref 35–47)
HGB BLD-MCNC: 10.3 G/DL (ref 11.7–15.7)
HGB UR QL STRIP: NEGATIVE
IMM GRANULOCYTES # BLD: 0 10E9/L (ref 0–0.4)
IMM GRANULOCYTES NFR BLD: 0.3 %
INR PPP: 0.97 (ref 0.86–1.14)
KETONES UR STRIP-MCNC: NEGATIVE MG/DL
LEUKOCYTE ESTERASE UR QL STRIP: NEGATIVE
LIPASE SERPL-CCNC: 105 U/L (ref 73–393)
LYMPHOCYTES # BLD AUTO: 2.1 10E9/L (ref 0.8–5.3)
LYMPHOCYTES NFR BLD AUTO: 29.8 %
MCH RBC QN AUTO: 27.5 PG (ref 26.5–33)
MCHC RBC AUTO-ENTMCNC: 31.3 G/DL (ref 31.5–36.5)
MCV RBC AUTO: 88 FL (ref 78–100)
MONOCYTES # BLD AUTO: 0.6 10E9/L (ref 0–1.3)
MONOCYTES NFR BLD AUTO: 8.4 %
NEUTROPHILS # BLD AUTO: 3.9 10E9/L (ref 1.6–8.3)
NEUTROPHILS NFR BLD AUTO: 54.8 %
NITRATE UR QL: NEGATIVE
NRBC # BLD AUTO: 0 10*3/UL
NRBC BLD AUTO-RTO: 0 /100
PH UR STRIP: 6 PH (ref 5–7)
PLATELET # BLD AUTO: 262 10E9/L (ref 150–450)
POTASSIUM SERPL-SCNC: 3.6 MMOL/L (ref 3.4–5.3)
PROT SERPL-MCNC: 7.4 G/DL (ref 6.8–8.8)
RBC # BLD AUTO: 3.74 10E12/L (ref 3.8–5.2)
RBC #/AREA URNS AUTO: <1 /HPF (ref 0–2)
SODIUM SERPL-SCNC: 140 MMOL/L (ref 133–144)
SOURCE: ABNORMAL
SP GR UR STRIP: 1.01 (ref 1–1.03)
SQUAMOUS #/AREA URNS AUTO: <1 /HPF (ref 0–1)
UROBILINOGEN UR STRIP-MCNC: NORMAL MG/DL (ref 0–2)
WBC # BLD AUTO: 7.1 10E9/L (ref 4–11)
WBC #/AREA URNS AUTO: 1 /HPF (ref 0–5)

## 2020-11-07 PROCEDURE — 74019 RADEX ABDOMEN 2 VIEWS: CPT | Mod: 26 | Performed by: RADIOLOGY

## 2020-11-07 PROCEDURE — 96375 TX/PRO/DX INJ NEW DRUG ADDON: CPT | Performed by: EMERGENCY MEDICINE

## 2020-11-07 PROCEDURE — 99285 EMERGENCY DEPT VISIT HI MDM: CPT | Mod: 25 | Performed by: EMERGENCY MEDICINE

## 2020-11-07 PROCEDURE — 74019 RADEX ABDOMEN 2 VIEWS: CPT

## 2020-11-07 PROCEDURE — 96374 THER/PROPH/DIAG INJ IV PUSH: CPT | Performed by: EMERGENCY MEDICINE

## 2020-11-07 PROCEDURE — 76705 ECHO EXAM OF ABDOMEN: CPT | Mod: 26 | Performed by: RADIOLOGY

## 2020-11-07 PROCEDURE — 250N000011 HC RX IP 250 OP 636: Performed by: EMERGENCY MEDICINE

## 2020-11-07 PROCEDURE — 85025 COMPLETE CBC W/AUTO DIFF WBC: CPT | Performed by: EMERGENCY MEDICINE

## 2020-11-07 PROCEDURE — 250N000013 HC RX MED GY IP 250 OP 250 PS 637: Performed by: EMERGENCY MEDICINE

## 2020-11-07 PROCEDURE — 96376 TX/PRO/DX INJ SAME DRUG ADON: CPT | Performed by: EMERGENCY MEDICINE

## 2020-11-07 PROCEDURE — 81001 URINALYSIS AUTO W/SCOPE: CPT | Performed by: EMERGENCY MEDICINE

## 2020-11-07 PROCEDURE — 258N000003 HC RX IP 258 OP 636: Performed by: EMERGENCY MEDICINE

## 2020-11-07 PROCEDURE — 80053 COMPREHEN METABOLIC PANEL: CPT | Performed by: EMERGENCY MEDICINE

## 2020-11-07 PROCEDURE — 83690 ASSAY OF LIPASE: CPT | Performed by: EMERGENCY MEDICINE

## 2020-11-07 PROCEDURE — 96361 HYDRATE IV INFUSION ADD-ON: CPT | Performed by: EMERGENCY MEDICINE

## 2020-11-07 PROCEDURE — 99285 EMERGENCY DEPT VISIT HI MDM: CPT | Performed by: EMERGENCY MEDICINE

## 2020-11-07 PROCEDURE — 76705 ECHO EXAM OF ABDOMEN: CPT

## 2020-11-07 PROCEDURE — 85610 PROTHROMBIN TIME: CPT | Performed by: EMERGENCY MEDICINE

## 2020-11-07 RX ORDER — HYDROMORPHONE HYDROCHLORIDE 2 MG/1
2 TABLET ORAL EVERY 6 HOURS PRN
Qty: 10 TABLET | Refills: 0 | Status: SHIPPED | OUTPATIENT
Start: 2020-11-07 | End: 2020-11-10

## 2020-11-07 RX ORDER — SODIUM CHLORIDE 9 MG/ML
INJECTION, SOLUTION INTRAVENOUS CONTINUOUS
Status: DISCONTINUED | OUTPATIENT
Start: 2020-11-07 | End: 2020-11-08 | Stop reason: HOSPADM

## 2020-11-07 RX ORDER — HEPARIN SODIUM (PORCINE) LOCK FLUSH IV SOLN 100 UNIT/ML 100 UNIT/ML
5 SOLUTION INTRAVENOUS
Status: DISCONTINUED | OUTPATIENT
Start: 2020-11-07 | End: 2020-11-08 | Stop reason: HOSPADM

## 2020-11-07 RX ORDER — HYDROMORPHONE HYDROCHLORIDE 1 MG/ML
0.5 INJECTION, SOLUTION INTRAMUSCULAR; INTRAVENOUS; SUBCUTANEOUS EVERY 30 MIN PRN
Status: COMPLETED | OUTPATIENT
Start: 2020-11-07 | End: 2020-11-07

## 2020-11-07 RX ORDER — DIPHENHYDRAMINE HCL 50 MG
50 CAPSULE ORAL ONCE
Status: COMPLETED | OUTPATIENT
Start: 2020-11-07 | End: 2020-11-07

## 2020-11-07 RX ORDER — ONDANSETRON 2 MG/ML
4 INJECTION INTRAMUSCULAR; INTRAVENOUS EVERY 30 MIN PRN
Status: DISCONTINUED | OUTPATIENT
Start: 2020-11-07 | End: 2020-11-08 | Stop reason: HOSPADM

## 2020-11-07 RX ADMIN — SODIUM CHLORIDE 500 ML: 9 INJECTION, SOLUTION INTRAVENOUS at 18:48

## 2020-11-07 RX ADMIN — DIPHENHYDRAMINE HYDROCHLORIDE 50 MG: 50 CAPSULE ORAL at 22:22

## 2020-11-07 RX ADMIN — HYDROMORPHONE HYDROCHLORIDE 0.5 MG: 1 INJECTION, SOLUTION INTRAMUSCULAR; INTRAVENOUS; SUBCUTANEOUS at 22:51

## 2020-11-07 RX ADMIN — HYDROMORPHONE HYDROCHLORIDE 0.5 MG: 1 INJECTION, SOLUTION INTRAMUSCULAR; INTRAVENOUS; SUBCUTANEOUS at 18:48

## 2020-11-07 RX ADMIN — HYDROMORPHONE HYDROCHLORIDE 0.5 MG: 1 INJECTION, SOLUTION INTRAMUSCULAR; INTRAVENOUS; SUBCUTANEOUS at 20:17

## 2020-11-07 RX ADMIN — Medication 5 ML: at 23:01

## 2020-11-07 RX ADMIN — ONDANSETRON 4 MG: 2 INJECTION INTRAMUSCULAR; INTRAVENOUS at 18:48

## 2020-11-07 ASSESSMENT — ENCOUNTER SYMPTOMS
FEVER: 0
VOMITING: 0
SHORTNESS OF BREATH: 0
ABDOMINAL PAIN: 1
CHILLS: 0

## 2020-11-07 NOTE — ED AVS SNAPSHOT
[facility] Emergency Department  [address] [address]  Phone: [phone]                                    Nevin Alvarado   MRN: [id number]    Department: [facility] Emergency Department   Date of Visit: 11/7/2020           After Visit Summary Signature Page    I have received my discharge instructions, and my questions have been answered. I have discussed any challenges I see with this plan with the nurse or doctor.    ..........................................................................................................................................  Patient/Patient Representative Signature      ..........................................................................................................................................  Patient Representative Print Name and Relationship to Patient    ..................................................               ................................................  Date                                   Time    ..........................................................................................................................................  Reviewed by Signature/Title    ...................................................              ..............................................  Date                                               Time          22EPIC Rev 08/18

## 2020-11-08 NOTE — ED TRIAGE NOTES
Pt had gallbladder removed on Thursday (11/5/20), pt had a reaction to oxycodone yesterday (Friday) w/ swelling to face. Was told to stop taking oxy. C/o increased abdominal pain d/t no pain meds and nausea.    EMS gave OTD zofran.

## 2020-11-08 NOTE — ED NOTES
Bed: ED26  Expected date:   Expected time:   Means of arrival:   Comments:  HE 29F pain after gallbladder surgery

## 2020-11-08 NOTE — DISCHARGE INSTRUCTIONS
Please make an appointment to follow up with your surgeons or your Primary Care Provider on Monday for further management as needed.

## 2020-11-08 NOTE — ED PROVIDER NOTES
[place] EMERGENCY DEPARTMENT ([facility])  11/07/20   History     Chief Complaint   Patient presents with     Abdominal Pain     HPI  Nevin Alvarado is a 29 year old female with multiple medical problems and psychiatric issues with self harm and foreign bodies either placed rectally or swallowed and has an emergency care plan.  The patient has been CT'd many times for some chronic pain issues and recently was hospitalized around [facility] here at our facility where she underwent an abdominal ultrasound x-ray and HIDA scan with surgical evaluation for possible biliary colic.  At that time, she was found to not have evidence of cholelithiasis and surgery recommended no removal of her gallbladder.  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  The patient states she was sent home the same day on oxycodone but became allergic to the oxycodone and could not take it.  Patient now presents to the ER here 2 days post cholecystectomy done laparoscopically with increasing abdominal pain uncontrolled without pain meds.  Patient denies fever, denies vomiting.  Patient was specifically asked if she ingested anything or inserted foreign bodies either rectally or vaginally and she denied this.    Recent surgical history verified in care everywhere    This part of the medical record was transcribed by [facility] [employer].     I have reviewed the Medications, Allergies, Past Medical and Surgical History, and Social History in the [employer] system.    Past Medical History:   Diagnosis Date     ADD (attention deficit disorder)      Anorexia nervosa with bulimia     history of; on Topamax     Anxiety      Borderline personality disorder (H)      Depression      H/O self-harm      History of pulmonary embolism 12/2019    Provoked.  Completed 3 month course of Apixaban     Morbid obesity (H)      Neuropathy      PTSD (post-traumatic stress disorder)      Rectal foreign body - Recurrent issue, self placed      Suicide attempt (H) 2/21/2018     Swallowed foreign body - Recurrent issue, self placed        Past Surgical History:   Procedure Laterality Date     COMBINED ESOPHAGOSCOPY, GASTROSCOPY, DUODENOSCOPY (EGD), REPLACE ESOPHAGEAL STENT N/A 10/9/2019    Procedure: Upper Endoscopy with Suture Placement;  Surgeon: Zurdo Ramirez MD;  [facility]     ESOPHAGOSCOPY, GASTROSCOPY, DUODENOSCOPY (EGD), COMBINED N/A 3/9/2017    Procedure: COMBINED ESOPHAGOSCOPY, GASTROSCOPY, DUODENOSCOPY (EGD), REMOVE FOREIGN BODY;  Surgeon: Avis Guzmán MD;  [facility]     ESOPHAGOSCOPY, GASTROSCOPY, DUODENOSCOPY (EGD), COMBINED N/A 4/20/2017    Procedure: COMBINED ESOPHAGOSCOPY, GASTROSCOPY, DUODENOSCOPY (EGD), REMOVE FOREIGN BODY;  EGD removal Foregin body;  Surgeon: Lokesh Paula MD;  [facility]     ESOPHAGOSCOPY, GASTROSCOPY, DUODENOSCOPY (EGD), COMBINED N/A 6/12/2017    Procedure: COMBINED ESOPHAGOSCOPY, GASTROSCOPY, DUODENOSCOPY (EGD);  COMBINED ESOPHAGOSCOPY, GASTROSCOPY, DUODENOSCOPY (EGD) [[id number]]attempted removal of foreign body;  Surgeon: Pamela Perez MD;  [facility]     ESOPHAGOSCOPY, GASTROSCOPY, DUODENOSCOPY (EGD), COMBINED N/A 6/9/2017    Procedure: COMBINED ESOPHAGOSCOPY, GASTROSCOPY, DUODENOSCOPY (EGD), REMOVE FOREIGN BODY;  Esophagoscopy, Gastroscopy, Duodenoscopy, Removal of Foreign Body;  Surgeon: Dejon Marsh MD;  [facility]     ESOPHAGOSCOPY, GASTROSCOPY, DUODENOSCOPY (EGD), COMBINED N/A 1/6/2018    Procedure: COMBINED ESOPHAGOSCOPY, GASTROSCOPY, DUODENOSCOPY (EGD), REMOVE FOREIGN BODY;  COMBINED ESOPHAGOSCOPY, GASTROSCOPY, DUODENOSCOPY (EGD) [by pascal net and snare with profol sedation;  Surgeon: Feliciano Emmanuel MD;  Location:  GI     ESOPHAGOSCOPY,  GASTROSCOPY, DUODENOSCOPY (EGD), COMBINED N/A 3/19/2018    Procedure: COMBINED ESOPHAGOSCOPY, GASTROSCOPY, DUODENOSCOPY (EGD), REMOVE FOREIGN BODY;   Esophagodscopy, Gastroscopy, Duodenoscopy,Foreign Body Removal;  Surgeon: Brice Guzmán MD;  [facility]     ESOPHAGOSCOPY, GASTROSCOPY, DUODENOSCOPY (EGD), COMBINED N/A 4/16/2018    Procedure: COMBINED ESOPHAGOSCOPY, GASTROSCOPY, DUODENOSCOPY (EGD), REMOVE FOREIGN BODY;  Esophagogastroduodenoscopy  Foreign Body Removal X 2;  Surgeon: Royer Moise MD;  [facility]     ESOPHAGOSCOPY, GASTROSCOPY, DUODENOSCOPY (EGD), COMBINED N/A 6/1/2018    Procedure: COMBINED ESOPHAGOSCOPY, GASTROSCOPY, DUODENOSCOPY (EGD), REMOVE FOREIGN BODY;  COMBINED ESOPHAGOSCOPY, GASTROSCOPY, DUODENOSCOPY with removal of foreign body, propofol sedation by anesthesia;  Surgeon: Briec Martinez MD;  Location:  GI     ESOPHAGOSCOPY, GASTROSCOPY, DUODENOSCOPY (EGD), COMBINED N/A 7/25/2018    Procedure: COMBINED ESOPHAGOSCOPY, GASTROSCOPY, DUODENOSCOPY (EGD), REMOVE FOREIGN BODY;;  Surgeon: Candy Castelan MD;  Location:  GI     ESOPHAGOSCOPY, GASTROSCOPY, DUODENOSCOPY (EGD), COMBINED N/A 7/28/2018    Procedure: COMBINED ESOPHAGOSCOPY, GASTROSCOPY, DUODENOSCOPY (EGD), REMOVE FOREIGN BODY;  COMBINED ESOPHAGOSCOPY, GASTROSCOPY, DUODENOSCOPY (EGD), REMOVE FOREIGN BODY;  Surgeon: Brice Guzmán MD;  [facility]     ESOPHAGOSCOPY, GASTROSCOPY, DUODENOSCOPY (EGD), COMBINED N/A 7/31/2018    Procedure: COMBINED ESOPHAGOSCOPY, GASTROSCOPY, DUODENOSCOPY (EGD);  COMBINED ESOPHAGOSCOPY, GASTROSCOPY, DUODENOSCOPY (EGD) TO REMOVE FOREIGN BODY;  Surgeon: Lokesh Paula MD;  [facility]     ESOPHAGOSCOPY, GASTROSCOPY, DUODENOSCOPY (EGD), COMBINED N/A 8/4/2018    Procedure: COMBINED ESOPHAGOSCOPY, GASTROSCOPY, DUODENOSCOPY (EGD), REMOVE FOREIGN BODY;   combined esophagoscopy, gastroscopy, duodenoscopy, REMOVE FOREIGN BODY ;  Surgeon: oLkesh Paula MD;   [facility]     ESOPHAGOSCOPY, GASTROSCOPY, DUODENOSCOPY (EGD), COMBINED N/A 10/6/2019    Procedure: ESOPHAGOGASTRODUODENOSCOPY (EGD) with fireign body removal x2, esophageal stent placement with floroscopy;  Surgeon: Timoteo Espana MD;  [facility]     ESOPHAGOSCOPY, GASTROSCOPY, DUODENOSCOPY (EGD), COMBINED N/A 12/2/2019    Procedure: Esophagogastroduodenoscopy with esophageal stent removal, esophogram;  Surgeon: Kailee Tena MD;  [facility]     ESOPHAGOSCOPY, GASTROSCOPY, DUODENOSCOPY (EGD), COMBINED N/A 12/17/2019    Procedure: ESOPHAGOGASTRODUODENOSCOPY, WITH FOREIGN BODY REMOVAL;  Surgeon: Pamela Perez MD;  [facility]     ESOPHAGOSCOPY, GASTROSCOPY, DUODENOSCOPY (EGD), COMBINED N/A 12/13/2019    Procedure: ESOPHAGOGASTRODUODENOSCOPY, WITH FOREIGN BODY REMOVAL;  Surgeon: Samia Stanton MD;  [facility]     ESOPHAGOSCOPY, GASTROSCOPY, DUODENOSCOPY (EGD), COMBINED N/A 12/28/2019    Procedure: ESOPHAGOGASTRODUODENOSCOPY (EGD) Removal of Foreign Body X 2;  Surgeon: Huy Kelley MD;  [facility]     ESOPHAGOSCOPY, GASTROSCOPY, DUODENOSCOPY (EGD), COMBINED N/A 1/5/2020    Procedure: ESOPHAGOGASTRODUOENOSCOPY WITH FOREIGN BODY REMOVAL;  Surgeon: Pamela Perez MD;  [facility]     ESOPHAGOSCOPY, GASTROSCOPY, DUODENOSCOPY (EGD), COMBINED N/A 1/3/2020    Procedure: ESOPHAGOGASTRODUODENOSCOPY (EGD) REMOVAL OF FOREIGN BODY.;  Surgeon: Pamela Perez MD;  [facility]     ESOPHAGOSCOPY, GASTROSCOPY, DUODENOSCOPY (EGD), COMBINED N/A 1/13/2020    Procedure: ESOPHAGOGASTRODUODENOSCOPY (EGD) for foreign body removal;  Surgeon: Lokesh Paula MD;  [facility]     ESOPHAGOSCOPY, GASTROSCOPY, DUODENOSCOPY (EGD), COMBINED N/A 1/18/2020    Procedure: Diagnostic ESOPHAGOGASTRODUODENOSCOPY (EGD;  Surgeon: Lokesh Paula MD;  [facility]     ESOPHAGOSCOPY, GASTROSCOPY, DUODENOSCOPY (EGD), COMBINED N/A 3/29/2020     Procedure: UPPER ENDOSCOPY WITH FOREIGN BODY REMOVAL;  Surgeon: Doug Hansen MD;  [facility]     ESOPHAGOSCOPY, GASTROSCOPY, DUODENOSCOPY (EGD), COMBINED N/A 7/11/2020    Procedure: ESOPHAGOGASTRODUODENOSCOPY (EGD); Upper Endoscopy WITH FOREIGN BODY REMOVAL;  Surgeon: Lyndsey Mendoza DO;  [facility]     ESOPHAGOSCOPY, GASTROSCOPY, DUODENOSCOPY (EGD), COMBINED N/A 7/29/2020    Procedure: ESOPHAGOGASTRODUODENOSCOPY REMOVAL OF FOREIGN BODY;  Surgeon: Samia Stanton MD;  [facility]     ESOPHAGOSCOPY, GASTROSCOPY, DUODENOSCOPY (EGD), COMBINED N/A 8/1/2020    Procedure: ESOPHAGOGASTRODUODENOSCOPY, WITH FOREIGN BODY REMOVAL;  Surgeon: Pamela Perez MD;  [facility]     ESOPHAGOSCOPY, GASTROSCOPY, DUODENOSCOPY (EGD), COMBINED N/A 8/18/2020    Procedure: ESOPHAGOGASTRODUODENOSCOPY (EGD) for foreign body removal;  Surgeon: Pamela Perez MD;  [facility]     ESOPHAGOSCOPY, GASTROSCOPY, DUODENOSCOPY (EGD), COMBINED N/A 8/27/2020    Procedure: ESOPHAGOGASTRODUODENOSCOPY (EGD) with foreign body removal;  Surgeon: Campbell Rogers MD;  [facility]     ESOPHAGOSCOPY, GASTROSCOPY, DUODENOSCOPY (EGD), COMBINED N/A 9/18/2020    Procedure: ESOPHAGOGASTRODUODENOSCOPY (EGD) with foreign body removal;  Surgeon: Dick Gillis MD;  [facility]     EXAM UNDER ANESTHESIA ANUS N/A 1/10/2017    Procedure: EXAM UNDER ANESTHESIA ANUS;  Surgeon: Annmarie Haynes MD;  [facility]     EXAM UNDER ANESTHESIA RECTUM N/A 7/19/2018    Procedure: EXAM UNDER ANESTHESIA RECTUM;  EXAM UNDER ANESTHESIA, REMOVAL OF RECTAL FOREIGN BODY;  Surgeon: Annmarie Haynes MD;  [facility]     HC REMOVE FECAL IMPACTION OR FB W ANESTHESIA N/A 12/18/2016    Procedure: REMOVE FECAL IMPACTION/FOREIGN BODY UNDER ANESTHESIA;  Surgeon: Soham Cano MD;  Location: [facility]     KNEE SURGERY - removed a small tissue mass from knee Right      LAPAROSCOPIC ABLATION  ENDOMETRIOSIS       LAPAROTOMY EXPLORATORY N/A 1/10/2017    Procedure: LAPAROTOMY EXPLORATORY;  Surgeon: Annmarie Haynes MD;  [facility]     LAPAROTOMY EXPLORATORY  09/11/2019    Manual manipulation of bowel to remove pill bottle in rectum     lymph nodes removed from neck; benign  age 7     MAMMOPLASTY REDUCTION Bilateral      RELEASE CARPAL TUNNEL Bilateral      SIGMOIDOSCOPY FLEXIBLE N/A 1/10/2017    Procedure: SIGMOIDOSCOPY FLEXIBLE;  Surgeon: Annmarie Haynes MD;  [facility]     SIGMOIDOSCOPY FLEXIBLE N/A 5/8/2018    Procedure: SIGMOIDOSCOPY FLEXIBLE;  flex sig with foreign body removal using snare and rattooth forcep;  Surgeon: Soham Cano MD;  Location: [facility]     SIGMOIDOSCOPY FLEXIBLE N/A 2/20/2019    Procedure: Exam under anesthesia Colonoscopy with attempted  removal of rectal foreign body;  Surgeon: Estrada Chávez MD;  [facility]           Dose / Directions   albuterol 108 (90 Base) MCG/ACT inhaler  Commonly known as: PROAIR HFA/PROVENTIL HFA/VENTOLIN HFA      Dose: 2 puff  Inhale 2 puffs into the lungs as needed for shortness of breath / dyspnea or wheezing  Refills: 0     busPIRone 15 MG tablet  Commonly known as: BUSPAR  Used for: Anxiety disorder, unspecified type      Dose: 15 mg  Take 1 tablet (15 mg) by mouth 2 times daily  Quantity:    Refills: 0     cloNIDine 0.1 MG tablet  Commonly known as: CATAPRES      Dose: 0.1 mg  Take 0.1 mg by mouth 2 times daily  Refills: 0     docosanol 10 % Crea cream  Commonly known as: ABREVA      Apply to lip 5 times a day as soon as symptoms begin, do not use for more than 10 days. Used PRN.  Refills: 0     hydroxychloroquine 200 MG tablet  Commonly known as: PLAQUENIL      Dose: 200 mg  Take 200 mg by mouth 2 times daily  Refills: 0     Latuda 60 MG Tabs tablet  Generic drug: lurasidone      Dose: 60 mg  Take 60 mg by mouth daily (with dinner)  Refills: 0     metFORMIN 500 MG 24 hr tablet  Commonly known as:  GLUCOPHAGE-XR      Dose: 1,000 mg  Take 1,000 mg by mouth daily (with dinner) Take 1 tablet (500 mg) by mouth daily  Refills: 0     PNV Prenatal Plus Multivitamin 27-1 MG Tabs per tablet      Dose: 1 tablet  Take 1 tablet by mouth daily  Refills: 0     pregabalin 50 MG capsule  Commonly known as: LYRICA      Dose: 50 mg  Take 50 mg by mouth 3 times daily  Refills: 0     Pristiq 100 MG 24 hr tablet  Generic drug: desvenlafaxine      Take 1 tablet (100 mg) by mouth every morning  Refills: 0     valACYclovir 1000 mg tablet  Commonly known as: VALTREX      Dose: 1,000 mg  Take 1,000 mg by mouth Take 2 tablets by mouth two times daily for one day. Use as needed at onset of cold sore.  Refills: 0     vitamin D 50 MCG (2000 UT) Caps      Dose: 2,000 Units  Take 2,000 Units by mouth daily  Refills: 0            Past medical history, past surgical history, medications, and allergies were reviewed with the patient. Additional pertinent items: None    Family History   Problem Relation Age of Onset     Diabetes Type 2  Maternal Grandmother      Diabetes Type 2  Paternal Grandmother      Breast Cancer Paternal Grandmother      Cerebrovascular Disease Father 53     Myocardial Infarction No family hx of      Coronary Artery Disease Early Onset No family hx of      Ovarian Cancer No family hx of      Colon Cancer No family hx of        Social History     Tobacco Use     Smoking status: Never Smoker     Smokeless tobacco: Never Used   Substance Use Topics     Alcohol use: No     Alcohol/week: 0.0 standard drinks     Social history was reviewed with the patient. Additional pertinent items: None    Allergies   Allergen Reactions     Amoxicillin-Pot Clavulanate Other (See Comments), Swelling and Rash     PN: facial swelling, left side. Also had cortisone injection the same day as she started the Augmentin.  Noted in downtime recovery of chart.    PN: facial swelling, left side. Also had cortisone injection the same day as she started  the Augmentin.; HUT Comment: PN: facial swelling, left side. Also had cortisone injection the same day as she started the Augmentin.Noted in downtime recovery of chart.; HUT Reaction: Rash; HUT Reaction: Unknown; HUT Reaction Type: Allergy; HUT Severity: Med; HUT Noted: [id number]     Oseltamivir Hives     med stopped, PN: med stopped  med stopped, PN: med stopped; HUT Comment: med stopped, PN: med stopped; HUT Reaction: Hives; HUT Reaction Type: Allergy; HUT Severity: Med; HUT Noted: [id number]     Penicillins Anaphylaxis     HUT Reaction: Anaphylaxis; HUT Reaction Type: Allergy; HUT Severity: High; HUT Noted: [id number]     Vancomycin Itching, Swelling and Rash     Other reaction(s): Redness  Flushed face, very itchy; HUT Comment: Flushed face, very itchy; HUT Reaction: Angioedema; HUT Reaction: Redness; HUT Severity: Med; HUT Noted: [id number]    facial     Hydrocodone Nausea and Vomiting and GI Disturbance     vomiting for days, PN: vomiting for days; HUT Comment: vomiting for days; HUT Reaction: Gastrointestinal; HUT Reaction: Nausea And Vomiting; HUT Reaction Type: Intolerance; HUT Severity: Med; HUT Noted: [id number]  vomiting for days       Blood-Group Specific Substance Other (See Comments)     Patient has an anti-Cw and non-specific antibodies. Blood product orders may be delayed. Draw one red top and two purple top tubes for all type/screen/crossmatch orders.  Patient has anti-Cw, anti-K (Angella), Warm auto and nonspecific antibodies. Blood products may be delayed. Draw patient 24 hours prior to transfusion. Draw one red top and two purple top tubes for all type and screen orders.     Oxycodone Swelling     Cephalosporins Rash     Influenza Vaccines Other (See Comments) and Nausea and Vomiting     HUT Reaction: Nausea And Vomiting; HUT Reaction Type: Intolerance; HUT Severity: Low; HUT Noted: [id number]     Lamotrigine Rash     Possibly associated with Lamictal.   HUT Comment: Possibly associated with Lamictal. ; HUT  Reaction: Rash; HUT Reaction Type: Allergy; HUT Severity: Low; HUT Noted: [id number]     Latex Rash     HUT Reaction: Rash; HUT Reaction Type: Allergy; HUT Severity: Low; HUT Noted: [id number]       Review of Systems   Constitutional: Negative for chills and fever.   Respiratory: Negative for shortness of breath.    Cardiovascular: Negative for chest pain.   Gastrointestinal: Positive for abdominal pain. Negative for vomiting.   All other systems reviewed and are negative.    A complete review of systems was performed with pertinent positives and negatives noted in the HPI, and all other systems negative.    Physical Exam   BP: 136/88  Pulse: 90  Temp: 98.4  F (36.9  C)  Resp: 16  SpO2: 99 %      Physical Exam  Vitals signs and nursing note reviewed.   Constitutional:       General: She is in acute distress.   HENT:      Head: Atraumatic.   Eyes:      Extraocular Movements: Extraocular movements intact.      Pupils: Pupils are equal, round, and reactive to light.   Pulmonary:      Breath sounds: Normal breath sounds.   Abdominal:      Comments: Morbidly obese, soft, tenderness in the right upper quadrant, laparoscopic incisions noted   Skin:     General: Skin is warm.   Neurological:      General: No focal deficit present.      Mental Status: She is alert and oriented to person, place, and time.   Psychiatric:      Comments: Labile         ED Course        Procedures          Results for orders placed or performed during the hospital encounter of 11/07/20 (from the past 24 hour(s))   CBC with platelets differential   Result Value Ref Range    WBC 7.1 4.0 - 11.0 10e9/L    RBC Count 3.74 (L) 3.8 - 5.2 10e12/L    Hemoglobin 10.3 (L) 11.7 - 15.7 g/dL    Hematocrit 32.9 (L) 35.0 - 47.0 %    MCV 88 78 - 100 fl    MCH 27.5 26.5 - 33.0 pg    MCHC 31.3 (L) 31.5 - 36.5 g/dL    RDW 14.1 10.0 - 15.0 %    Platelet Count 262 150 - 450 10e9/L    Diff Method Automated Method     % Neutrophils 54.8 %    % Lymphocytes 29.8 %    %  Monocytes 8.4 %    % Eosinophils 6.0 %    % Basophils 0.7 %    % Immature Granulocytes 0.3 %    Nucleated RBCs 0 0 /100    Absolute Neutrophil 3.9 1.6 - 8.3 10e9/L    Absolute Lymphocytes 2.1 0.8 - 5.3 10e9/L    Absolute Monocytes 0.6 0.0 - 1.3 10e9/L    Absolute Eosinophils 0.4 0.0 - 0.7 10e9/L    Absolute Basophils 0.1 0.0 - 0.2 10e9/L    Abs Immature Granulocytes 0.0 0 - 0.4 10e9/L    Absolute Nucleated RBC 0.0    INR   Result Value Ref Range    INR 0.97 0.86 - 1.14   Comprehensive metabolic panel   Result Value Ref Range    Sodium 140 133 - 144 mmol/L    Potassium 3.6 3.4 - 5.3 mmol/L    Chloride 107 94 - 109 mmol/L    Carbon Dioxide 29 20 - 32 mmol/L    Anion Gap 4 3 - 14 mmol/L    Glucose 92 70 - 99 mg/dL    Urea Nitrogen 11 7 - 30 mg/dL    Creatinine 0.62 0.52 - 1.04 mg/dL    GFR Estimate >90 >60 mL/min/[1.73_m2]    GFR Estimate If Black >90 >60 mL/min/[1.73_m2]    Calcium 9.0 8.5 - 10.1 mg/dL    Bilirubin Total 0.2 0.2 - 1.3 mg/dL    Albumin 3.3 (L) 3.4 - 5.0 g/dL    Protein Total 7.4 6.8 - 8.8 g/dL    Alkaline Phosphatase 76 40 - 150 U/L    ALT 32 0 - 50 U/L    AST 24 0 - 45 U/L   Lipase   Result Value Ref Range    Lipase 105 73 - 393 U/L   UA with Microscopic reflex to Culture    Specimen: Urine clean catch; Midstream Urine   Result Value Ref Range    Color Urine Light Yellow     Appearance Urine Clear     Glucose Urine Negative NEG^Negative mg/dL    Bilirubin Urine Negative NEG^Negative    Ketones Urine Negative NEG^Negative mg/dL    Specific Gravity Urine 1.006 1.003 - 1.035    Blood Urine Negative NEG^Negative    pH Urine 6.0 5.0 - 7.0 pH    Protein Albumin Urine Negative NEG^Negative mg/dL    Urobilinogen mg/dL Normal 0.0 - 2.0 mg/dL    Nitrite Urine Negative NEG^Negative    Leukocyte Esterase Urine Negative NEG^Negative    Source Midstream Urine     WBC Urine 1 0 - 5 /HPF    RBC Urine <1 0 - 2 /HPF    Bacteria Urine Few (A) NEG^Negative /HPF    Squamous Epithelial /HPF Urine <1 0 - 1 /HPF   Abdomen  US, limited (RUQ only)    Narrative    EXAMINATION: Limited Abdominal Ultrasound, 11/7/2020 8:23 PM     COMPARISON: Abdominal ultrasound 10/30/2020.    History: s/p lap adrianna, pain, r/o retained stone.     FINDINGS:   Technically difficult exam due to patient body habitus.    Fluid: No evidence of ascites or pleural effusions.    Liver: Liver demonstrates questionable increased parenchymal  echogenicity versus artifact from overlying soft tissues, measuring  17.2 cm in craniocaudal dimension. Main portal vein is patent.    Gallbladder: Surgically absent.    Bile Ducts: No definite intra or extrahepatic biliary duct dilation.   The common bile duct measures 2 mm in diameter.    Pancreas: Not well visualized.    Kidney: The right kidney measures 9.4 cm long. There is no  hydronephrosis or hydroureter, no shadowing renal calculi, cystic  lesion or mass.       Impression    IMPRESSION:   1. No focal sonographic findings in the right upper quadrant. Common  bile duct is normal in caliber without shadowing stone appreciated.    2. Questionable hepatic steatosis versus artifact due to overlying  soft tissues.    I have personally reviewed the examination and initial interpretation  and I agree with the findings.    RICHELLE JACKSON MD     Flat and upright of the patient's abdomen revealed no evidence of free air no evidence of ileus and no evidence of foreign body.    Medications   0.9% sodium chloride BOLUS (0 mLs Intravenous Stopped 11/7/20 2252)     Followed by   sodium chloride 0.9% infusion (has no administration in time range)   ondansetron (ZOFRAN) injection 4 mg (4 mg Intravenous Given 11/7/20 1848)   heparin 100 UNIT/ML injection 5 mL (has no administration in time range)   HYDROmorphone (PF) (DILAUDID) injection 0.5 mg (0.5 mg Intravenous Given 11/7/20 2251)   diphenhydrAMINE (BENADRYL) capsule 50 mg (50 mg Oral Given 11/7/20 2222)          Assessments & Plan (with Medical Decision Making)     I have reviewed the  nursing notes.    Patient did feel better after the IV Dilaudid here in the ER and at this point was offered transfer to regions so her surgeons could care for her postoperatively or to go home with a few Dilaudid tablets and follow-up with her surgeons on Monday.  The patient chose the latter.    I have reviewed the findings, diagnosis, plan and need for follow up with the patient.    New Prescriptions    HYDROMORPHONE (DILAUDID) 2 MG TABLET    Take 1 tablet (2 mg) by mouth every 6 hours as needed for moderate to severe pain       Final diagnoses:   Acute post-operative pain - s/p lap adrianna 2 days ago     Please make an appointment to follow up with your surgeons or your Primary Care Provider on Monday for further management as needed.      Bruce Pryor MD, MD    11/7/2020   formerly [facility] EMERGENCY DEPARTMENT     Bruce Pryor MD  11/07/20 5943

## 2020-11-08 NOTE — ED NOTES
Per Dr. Pryor, patient does not need a sitter or search/room strip. Pt is not here for self harm, denies ingesting/hiding any foreign objects. Pt states she is only here for pain r/t recent gallbladder surgery.

## 2020-11-17 ENCOUNTER — APPOINTMENT (OUTPATIENT)
Dept: GENERAL RADIOLOGY | Facility: CLINIC | Age: 30
End: 2020-11-17
Attending: EMERGENCY MEDICINE
Payer: COMMERCIAL

## 2020-11-17 ENCOUNTER — HOSPITAL ENCOUNTER (EMERGENCY)
Facility: CLINIC | Age: 30
Discharge: HOME | End: 2020-11-18
Attending: EMERGENCY MEDICINE | Admitting: INTERNAL MEDICINE
Payer: COMMERCIAL

## 2020-11-17 DIAGNOSIS — T18.2XXA FOREIGN BODY IN STOMACH, INITIAL ENCOUNTER: ICD-10-CM

## 2020-11-17 DIAGNOSIS — Z20.828 CONTACT WITH AND (SUSPECTED) EXPOSURE TO OTHER VIRAL COMMUNICABLE DISEASES: ICD-10-CM

## 2020-11-17 DIAGNOSIS — F90.9 ATTENTION DEFICIT HYPERACTIVITY DISORDER (ADHD), UNSPECIFIED ADHD TYPE: ICD-10-CM

## 2020-11-17 DIAGNOSIS — T18.9XXA SWALLOWED FOREIGN BODY, INITIAL ENCOUNTER: ICD-10-CM

## 2020-11-17 PROCEDURE — 85025 COMPLETE CBC W/AUTO DIFF WBC: CPT | Performed by: EMERGENCY MEDICINE

## 2020-11-17 PROCEDURE — 99285 EMERGENCY DEPT VISIT HI MDM: CPT | Performed by: EMERGENCY MEDICINE

## 2020-11-17 PROCEDURE — 74018 RADEX ABDOMEN 1 VIEW: CPT

## 2020-11-17 PROCEDURE — 84703 CHORIONIC GONADOTROPIN ASSAY: CPT | Performed by: EMERGENCY MEDICINE

## 2020-11-17 PROCEDURE — 99285 EMERGENCY DEPT VISIT HI MDM: CPT | Mod: 25 | Performed by: EMERGENCY MEDICINE

## 2020-11-17 PROCEDURE — 80048 BASIC METABOLIC PNL TOTAL CA: CPT | Performed by: EMERGENCY MEDICINE

## 2020-11-17 PROCEDURE — 74018 RADEX ABDOMEN 1 VIEW: CPT | Mod: 26 | Performed by: RADIOLOGY

## 2020-11-17 PROCEDURE — C9803 HOPD COVID-19 SPEC COLLECT: HCPCS | Performed by: EMERGENCY MEDICINE

## 2020-11-17 PROCEDURE — U0003 INFECTIOUS AGENT DETECTION BY NUCLEIC ACID (DNA OR RNA); SEVERE ACUTE RESPIRATORY SYNDROME CORONAVIRUS 2 (SARS-COV-2) (CORONAVIRUS DISEASE [COVID-19]), AMPLIFIED PROBE TECHNIQUE, MAKING USE OF HIGH THROUGHPUT TECHNOLOGIES AS DESCRIBED BY CMS-2020-01-R: HCPCS | Performed by: EMERGENCY MEDICINE

## 2020-11-17 ASSESSMENT — ENCOUNTER SYMPTOMS
SHORTNESS OF BREATH: 0
DYSURIA: 0
SORE THROAT: 0
CHILLS: 0
FEVER: 0
DIFFICULTY URINATING: 0
ABDOMINAL PAIN: 0
NAUSEA: 0
HEMATURIA: 0
FREQUENCY: 0
RHINORRHEA: 0
VOMITING: 0
COUGH: 0

## 2020-11-17 NOTE — ED AVS SNAPSHOT
[facility]  [address] [address]  Phone: [phone]                                    Nevin Alvarado   MRN: [id number]    Department: [facility]   Date of Visit: 11/17/2020           After Visit Summary Signature Page    I have received my discharge instructions, and my questions have been answered. I have discussed any challenges I see with this plan with the nurse or doctor.    ..........................................................................................................................................  Patient/Patient Representative Signature      ..........................................................................................................................................  Patient Representative Print Name and Relationship to Patient    ..................................................               ................................................  Date                                   Time    ..........................................................................................................................................  Reviewed by Signature/Title    ...................................................              ..............................................  Date                                               Time          22EPIC Rev 08/18

## 2020-11-18 ENCOUNTER — ANESTHESIA (OUTPATIENT)
Dept: SURGERY | Facility: CLINIC | Age: 30
End: 2020-11-18
Payer: COMMERCIAL

## 2020-11-18 ENCOUNTER — ANESTHESIA EVENT (OUTPATIENT)
Dept: SURGERY | Facility: CLINIC | Age: 30
End: 2020-11-18
Payer: COMMERCIAL

## 2020-11-18 VITALS
RESPIRATION RATE: 16 BRPM | HEART RATE: 74 BPM | DIASTOLIC BLOOD PRESSURE: 76 MMHG | TEMPERATURE: 98.2 F | SYSTOLIC BLOOD PRESSURE: 122 MMHG | OXYGEN SATURATION: 98 %

## 2020-11-18 LAB
ANION GAP SERPL CALCULATED.3IONS-SCNC: 6 MMOL/L (ref 3–14)
BASOPHILS # BLD AUTO: 0.1 10E9/L (ref 0–0.2)
BASOPHILS NFR BLD AUTO: 0.8 %
BUN SERPL-MCNC: 13 MG/DL (ref 7–30)
CALCIUM SERPL-MCNC: 9.1 MG/DL (ref 8.5–10.1)
CHLORIDE SERPL-SCNC: 106 MMOL/L (ref 94–109)
CO2 SERPL-SCNC: 27 MMOL/L (ref 20–32)
CREAT SERPL-MCNC: 0.62 MG/DL (ref 0.52–1.04)
DIFFERENTIAL METHOD BLD: ABNORMAL
EOSINOPHIL # BLD AUTO: 0.5 10E9/L (ref 0–0.7)
EOSINOPHIL NFR BLD AUTO: 5.8 %
ERYTHROCYTE [DISTWIDTH] IN BLOOD BY AUTOMATED COUNT: 13.7 % (ref 10–15)
GFR SERPL CREATININE-BSD FRML MDRD: >90 ML/MIN/{1.73_M2}
GLUCOSE SERPL-MCNC: 133 MG/DL (ref 70–99)
HCG SERPL QL: NEGATIVE
HCT VFR BLD AUTO: 33.3 % (ref 35–47)
HGB BLD-MCNC: 10.6 G/DL (ref 11.7–15.7)
IMM GRANULOCYTES # BLD: 0 10E9/L (ref 0–0.4)
IMM GRANULOCYTES NFR BLD: 0.2 %
LABORATORY COMMENT REPORT: NORMAL
LYMPHOCYTES # BLD AUTO: 1.9 10E9/L (ref 0.8–5.3)
LYMPHOCYTES NFR BLD AUTO: 22.3 %
MCH RBC QN AUTO: 27.7 PG (ref 26.5–33)
MCHC RBC AUTO-ENTMCNC: 31.8 G/DL (ref 31.5–36.5)
MCV RBC AUTO: 87 FL (ref 78–100)
MONOCYTES # BLD AUTO: 0.6 10E9/L (ref 0–1.3)
MONOCYTES NFR BLD AUTO: 7.6 %
NEUTROPHILS # BLD AUTO: 5.4 10E9/L (ref 1.6–8.3)
NEUTROPHILS NFR BLD AUTO: 63.3 %
NRBC # BLD AUTO: 0 10*3/UL
NRBC BLD AUTO-RTO: 0 /100
PLATELET # BLD AUTO: 282 10E9/L (ref 150–450)
POTASSIUM SERPL-SCNC: 3.4 MMOL/L (ref 3.4–5.3)
RBC # BLD AUTO: 3.82 10E12/L (ref 3.8–5.2)
SARS-COV-2 RNA SPEC QL NAA+PROBE: NEGATIVE
SARS-COV-2 RNA SPEC QL NAA+PROBE: NORMAL
SODIUM SERPL-SCNC: 139 MMOL/L (ref 133–144)
SPECIMEN SOURCE: NORMAL
SPECIMEN SOURCE: NORMAL
UPPER GI ENDOSCOPY: NORMAL
WBC # BLD AUTO: 8.5 10E9/L (ref 4–11)

## 2020-11-18 PROCEDURE — 250N000009 HC RX 250: Performed by: STUDENT IN AN ORGANIZED HEALTH CARE EDUCATION/TRAINING PROGRAM

## 2020-11-18 PROCEDURE — 250N000011 HC RX IP 250 OP 636: Performed by: STUDENT IN AN ORGANIZED HEALTH CARE EDUCATION/TRAINING PROGRAM

## 2020-11-18 PROCEDURE — 272N000001 HC OR GENERAL SUPPLY STERILE: Performed by: INTERNAL MEDICINE

## 2020-11-18 PROCEDURE — 370N000002 HC ANESTHESIA TECHNICAL FEE, EACH ADDTL 15 MIN: Performed by: INTERNAL MEDICINE

## 2020-11-18 PROCEDURE — 360N000016 HC SURGERY LEVEL 2 1ST 30 MIN - UMMC: Performed by: INTERNAL MEDICINE

## 2020-11-18 PROCEDURE — 250N000013 HC RX MED GY IP 250 OP 250 PS 637: Performed by: STUDENT IN AN ORGANIZED HEALTH CARE EDUCATION/TRAINING PROGRAM

## 2020-11-18 PROCEDURE — 43247 EGD REMOVE FOREIGN BODY: CPT | Performed by: INTERNAL MEDICINE

## 2020-11-18 PROCEDURE — 370N000001 HC ANESTHESIA TECHNICAL FEE, 1ST 30 MIN: Performed by: INTERNAL MEDICINE

## 2020-11-18 PROCEDURE — 250N000011 HC RX IP 250 OP 636: Performed by: ANESTHESIOLOGY

## 2020-11-18 PROCEDURE — 90791 PSYCH DIAGNOSTIC EVALUATION: CPT

## 2020-11-18 PROCEDURE — 250N000003 HC SEVOFLURANE, EA 15 MIN: Performed by: INTERNAL MEDICINE

## 2020-11-18 PROCEDURE — 761N000003 HC RECOVERY PHASE 1 LEVEL 2 FIRST HR: Performed by: INTERNAL MEDICINE

## 2020-11-18 PROCEDURE — 258N000003 HC RX IP 258 OP 636: Performed by: STUDENT IN AN ORGANIZED HEALTH CARE EDUCATION/TRAINING PROGRAM

## 2020-11-18 PROCEDURE — 360N000017 HC SURGERY LEVEL 2 EA 15 ADDTL MIN - UMMC: Performed by: INTERNAL MEDICINE

## 2020-11-18 RX ORDER — NALOXONE HYDROCHLORIDE 0.4 MG/ML
.1-.4 INJECTION, SOLUTION INTRAMUSCULAR; INTRAVENOUS; SUBCUTANEOUS
Status: DISCONTINUED | OUTPATIENT
Start: 2020-11-18 | End: 2020-11-18 | Stop reason: HOSPADM

## 2020-11-18 RX ORDER — SODIUM CHLORIDE, SODIUM LACTATE, POTASSIUM CHLORIDE, CALCIUM CHLORIDE 600; 310; 30; 20 MG/100ML; MG/100ML; MG/100ML; MG/100ML
INJECTION, SOLUTION INTRAVENOUS CONTINUOUS
Status: DISCONTINUED | OUTPATIENT
Start: 2020-11-18 | End: 2020-11-18 | Stop reason: HOSPADM

## 2020-11-18 RX ORDER — FENTANYL CITRATE 50 UG/ML
INJECTION, SOLUTION INTRAMUSCULAR; INTRAVENOUS PRN
Status: DISCONTINUED | OUTPATIENT
Start: 2020-11-18 | End: 2020-11-18

## 2020-11-18 RX ORDER — ONDANSETRON 2 MG/ML
INJECTION INTRAMUSCULAR; INTRAVENOUS PRN
Status: DISCONTINUED | OUTPATIENT
Start: 2020-11-18 | End: 2020-11-18

## 2020-11-18 RX ORDER — FENTANYL CITRATE 50 UG/ML
25-50 INJECTION, SOLUTION INTRAMUSCULAR; INTRAVENOUS
Status: DISCONTINUED | OUTPATIENT
Start: 2020-11-18 | End: 2020-11-18 | Stop reason: HOSPADM

## 2020-11-18 RX ORDER — ONDANSETRON 2 MG/ML
4 INJECTION INTRAMUSCULAR; INTRAVENOUS EVERY 30 MIN PRN
Status: DISCONTINUED | OUTPATIENT
Start: 2020-11-18 | End: 2020-11-18 | Stop reason: HOSPADM

## 2020-11-18 RX ORDER — HEPARIN SODIUM (PORCINE) LOCK FLUSH IV SOLN 100 UNIT/ML 100 UNIT/ML
5 SOLUTION INTRAVENOUS
Status: DISCONTINUED | OUTPATIENT
Start: 2020-11-18 | End: 2020-11-18 | Stop reason: HOSPADM

## 2020-11-18 RX ORDER — HYDROMORPHONE HYDROCHLORIDE 1 MG/ML
.3-.5 INJECTION, SOLUTION INTRAMUSCULAR; INTRAVENOUS; SUBCUTANEOUS EVERY 5 MIN PRN
Status: DISCONTINUED | OUTPATIENT
Start: 2020-11-18 | End: 2020-11-18 | Stop reason: HOSPADM

## 2020-11-18 RX ORDER — DIPHENHYDRAMINE HYDROCHLORIDE 50 MG/ML
12.5 INJECTION INTRAMUSCULAR; INTRAVENOUS ONCE
Status: COMPLETED | OUTPATIENT
Start: 2020-11-18 | End: 2020-11-18

## 2020-11-18 RX ORDER — PROPOFOL 10 MG/ML
INJECTION, EMULSION INTRAVENOUS PRN
Status: DISCONTINUED | OUTPATIENT
Start: 2020-11-18 | End: 2020-11-18

## 2020-11-18 RX ORDER — ALBUTEROL SULFATE 90 UG/1
AEROSOL, METERED RESPIRATORY (INHALATION) PRN
Status: DISCONTINUED | OUTPATIENT
Start: 2020-11-18 | End: 2020-11-18

## 2020-11-18 RX ORDER — DEXAMETHASONE SODIUM PHOSPHATE 4 MG/ML
INJECTION, SOLUTION INTRA-ARTICULAR; INTRALESIONAL; INTRAMUSCULAR; INTRAVENOUS; SOFT TISSUE PRN
Status: DISCONTINUED | OUTPATIENT
Start: 2020-11-18 | End: 2020-11-18

## 2020-11-18 RX ORDER — SODIUM CHLORIDE, SODIUM LACTATE, POTASSIUM CHLORIDE, CALCIUM CHLORIDE 600; 310; 30; 20 MG/100ML; MG/100ML; MG/100ML; MG/100ML
INJECTION, SOLUTION INTRAVENOUS CONTINUOUS PRN
Status: DISCONTINUED | OUTPATIENT
Start: 2020-11-18 | End: 2020-11-18

## 2020-11-18 RX ORDER — ONDANSETRON 4 MG/1
4 TABLET, ORALLY DISINTEGRATING ORAL EVERY 30 MIN PRN
Status: DISCONTINUED | OUTPATIENT
Start: 2020-11-18 | End: 2020-11-18 | Stop reason: HOSPADM

## 2020-11-18 RX ADMIN — SODIUM CHLORIDE, POTASSIUM CHLORIDE, SODIUM LACTATE AND CALCIUM CHLORIDE: 600; 310; 30; 20 INJECTION, SOLUTION INTRAVENOUS at 01:30

## 2020-11-18 RX ADMIN — FENTANYL CITRATE 25 MCG: 50 INJECTION, SOLUTION INTRAMUSCULAR; INTRAVENOUS at 02:31

## 2020-11-18 RX ADMIN — MIDAZOLAM 1 MG: 1 INJECTION INTRAMUSCULAR; INTRAVENOUS at 01:37

## 2020-11-18 RX ADMIN — ROCURONIUM BROMIDE 50 MG: 10 INJECTION INTRAVENOUS at 01:41

## 2020-11-18 RX ADMIN — SUGAMMADEX 200 MG: 100 INJECTION, SOLUTION INTRAVENOUS at 02:21

## 2020-11-18 RX ADMIN — Medication 100 MG: at 01:37

## 2020-11-18 RX ADMIN — MIDAZOLAM 1 MG: 1 INJECTION INTRAMUSCULAR; INTRAVENOUS at 01:27

## 2020-11-18 RX ADMIN — DEXAMETHASONE SODIUM PHOSPHATE 8 MG: 4 INJECTION, SOLUTION INTRA-ARTICULAR; INTRALESIONAL; INTRAMUSCULAR; INTRAVENOUS; SOFT TISSUE at 01:53

## 2020-11-18 RX ADMIN — PROPOFOL 200 MG: 10 INJECTION, EMULSION INTRAVENOUS at 01:37

## 2020-11-18 RX ADMIN — ALBUTEROL SULFATE 12 PUFF: 108 INHALANT RESPIRATORY (INHALATION) at 02:22

## 2020-11-18 RX ADMIN — ONDANSETRON 4 MG: 2 INJECTION INTRAMUSCULAR; INTRAVENOUS at 02:16

## 2020-11-18 RX ADMIN — DIPHENHYDRAMINE HYDROCHLORIDE 12.5 MG: 50 INJECTION, SOLUTION INTRAMUSCULAR; INTRAVENOUS at 02:58

## 2020-11-18 RX ADMIN — FENTANYL CITRATE 50 MCG: 50 INJECTION, SOLUTION INTRAMUSCULAR; INTRAVENOUS at 01:37

## 2020-11-18 RX ADMIN — FENTANYL CITRATE 25 MCG: 50 INJECTION, SOLUTION INTRAMUSCULAR; INTRAVENOUS at 02:50

## 2020-11-18 RX ADMIN — FENTANYL CITRATE 50 MCG: 50 INJECTION, SOLUTION INTRAMUSCULAR; INTRAVENOUS at 03:00

## 2020-11-18 RX ADMIN — FENTANYL CITRATE 25 MCG: 50 INJECTION, SOLUTION INTRAMUSCULAR; INTRAVENOUS at 02:44

## 2020-11-18 NOTE — ANESTHESIA PREPROCEDURE EVALUATION
Anesthesia Pre-Procedure Evaluation    Patient: Nevin Alvarado   MRN:     [id number] Gender:   female   Age:    30 year old :      1990        Preoperative Diagnosis: Swallowed foreign body, initial encounter [T18.9XXA]   Procedure(s):  ESOPHAGOGASTRODUODENOSCOPY, WITH FOREIGN BODY REMOVAL     LABS:  CBC:   Lab Results   Component Value Date    WBC 8.5 2020    WBC 7.1 2020    HGB 10.6 (L) 2020    HGB 10.3 (L) 2020    HCT 33.3 (L) 2020    HCT 32.9 (L) 2020     2020     2020     BMP:   Lab Results   Component Value Date     2020     2020    POTASSIUM 3.4 2020    POTASSIUM 3.6 2020    CHLORIDE 106 2020    CHLORIDE 107 2020    CO2 27 2020    CO2 29 2020    BUN 13 2020    BUN 11 2020    CR 0.62 2020    CR 0.62 2020     (H) 2020    GLC 92 2020     COAGS:   Lab Results   Component Value Date    PTT 34 2020    INR 0.97 2020     POC:   Lab Results   Component Value Date    BGM 95 2020    HCG Negative 10/31/2020    HCGS Negative 2020     OTHER:   Lab Results   Component Value Date    LACT 1.2 10/30/2020    KELBY 9.1 2020    PHOS 3.5 2019    MAG 2.0 10/31/2020    ALBUMIN 3.3 (L) 2020    PROTTOTAL 7.4 2020    ALT 32 2020    AST 24 2020    ALKPHOS 76 2020    BILITOTAL 0.2 2020    LIPASE 105 2020    TSH 6.96 (H) 2020    T4 0.94 2020    CRP 26.0 (H) 10/31/2020    SED 49 (H) 10/11/2020        Preop Vitals    BP Readings from Last 3 Encounters:   20 (!) 142/74   20 (!) 135/92   10/31/20 (!) 139/102    Pulse Readings from Last 3 Encounters:   20 90   20 90   10/31/20 117      Resp Readings from Last 3 Encounters:   20 16   20 16   10/31/20 16    SpO2 Readings from Last 3 Encounters:   20 99%   20 94%   10/31/20 93%     "  Temp Readings from Last 1 Encounters:   11/17/20 36.1  C (97  F) (Oral)    Ht Readings from Last 1 Encounters:   10/30/20 1.575 m (5' 2\")      Wt Readings from Last 1 Encounters:   10/30/20 121.6 kg (268 lb)    Estimated body mass index is 49.02 kg/m  as calculated from the following:    Height as of 10/30/20: 1.575 m (5' 2\").    Weight as of 10/30/20: 121.6 kg (268 lb).     LDA:  Port A Cath Single 09/08/20 Right Chest wall (Active)   Number of days: 71        Past Medical History:   Diagnosis Date     ADD (attention deficit disorder)      Anorexia nervosa with bulimia     history of; on Topamax     Anxiety      Borderline personality disorder (H)      Depression      H/O self-harm      History of pulmonary embolism 12/2019    Provoked. Completed 3 month course of Apixaban     Morbid obesity (H)      Neuropathy      PTSD (post-traumatic stress disorder)      Rectal foreign body - Recurrent issue, self placed      Suicide attempt (H) 2/21/2018     Swallowed foreign body - Recurrent issue, self placed       Past Surgical History:   Procedure Laterality Date     COMBINED ESOPHAGOSCOPY, GASTROSCOPY, DUODENOSCOPY (EGD), REPLACE ESOPHAGEAL STENT N/A 10/9/2019    Procedure: Upper Endoscopy with Suture Placement;  Surgeon: Zurdo Ramirez MD;  [facility]: [facility]     ESOPHAGOSCOPY, GASTROSCOPY, DUODENOSCOPY (EGD), COMBINED N/A 3/9/2017    Procedure: COMBINED ESOPHAGOSCOPY, GASTROSCOPY, DUODENOSCOPY (EGD), REMOVE FOREIGN BODY;  Surgeon: Avis Guzmán MD;  Location: [facility]     ESOPHAGOSCOPY, GASTROSCOPY, DUODENOSCOPY (EGD), COMBINED N/A 4/20/2017    Procedure: COMBINED ESOPHAGOSCOPY, GASTROSCOPY, DUODENOSCOPY (EGD), REMOVE FOREIGN BODY;  EGD removal Foregin body;  Surgeon: Lokesh Paula MD;  Location: [facility]     ESOPHAGOSCOPY, GASTROSCOPY, DUODENOSCOPY (EGD), COMBINED N/A 6/12/2017    Procedure: COMBINED ESOPHAGOSCOPY, GASTROSCOPY, DUODENOSCOPY (EGD);  COMBINED ESOPHAGOSCOPY, GASTROSCOPY, DUODENOSCOPY " (EGD) [[id number]]attempted removal of foreign body;  Surgeon: Pamela Perez MD;  [facility]: [facility]     ESOPHAGOSCOPY, GASTROSCOPY, DUODENOSCOPY (EGD), COMBINED N/A 6/9/2017    Procedure: COMBINED ESOPHAGOSCOPY, GASTROSCOPY, DUODENOSCOPY (EGD), REMOVE FOREIGN BODY;  Esophagoscopy, Gastroscopy, Duodenoscopy, Removal of Foreign Body;  Surgeon: Dejon Marsh MD;  Location: [facility]     ESOPHAGOSCOPY, GASTROSCOPY, DUODENOSCOPY (EGD), COMBINED N/A 1/6/2018    Procedure: COMBINED ESOPHAGOSCOPY, GASTROSCOPY, DUODENOSCOPY (EGD), REMOVE FOREIGN BODY;  COMBINED ESOPHAGOSCOPY, GASTROSCOPY, DUODENOSCOPY (EGD) [by pascal net and snare with profol sedation;  Surgeon: Feliciano Emmanuel MD;  Location:  [facility]     ESOPHAGOSCOPY, GASTROSCOPY, DUODENOSCOPY (EGD), COMBINED N/A 3/19/2018    Procedure: COMBINED ESOPHAGOSCOPY, GASTROSCOPY, DUODENOSCOPY (EGD), REMOVE FOREIGN BODY;   Esophagodscopy, Gastroscopy, Duodenoscopy,Foreign Body Removal;  Surgeon: Brice Guzmán MD;  [facility]: [facility]     ESOPHAGOSCOPY, GASTROSCOPY, DUODENOSCOPY (EGD), COMBINED N/A 4/16/2018    Procedure: COMBINED ESOPHAGOSCOPY, GASTROSCOPY, DUODENOSCOPY (EGD), REMOVE FOREIGN BODY;  Esophagogastroduodenoscopy  Foreign Body Removal X 2;  Surgeon: Royer Moise MD;  [facility]     ESOPHAGOSCOPY, GASTROSCOPY, DUODENOSCOPY (EGD), COMBINED N/A 6/1/2018    Procedure: COMBINED ESOPHAGOSCOPY, GASTROSCOPY, DUODENOSCOPY (EGD), REMOVE FOREIGN BODY;  COMBINED ESOPHAGOSCOPY, GASTROSCOPY, DUODENOSCOPY with removal of foreign body, propofol sedation by anesthesia;  Surgeon: Brice Martinez MD;  Location:  GI     ESOPHAGOSCOPY, GASTROSCOPY, DUODENOSCOPY (EGD), COMBINED N/A 7/25/2018    Procedure: COMBINED ESOPHAGOSCOPY, GASTROSCOPY, DUODENOSCOPY (EGD), REMOVE FOREIGN BODY;;  Surgeon: Candy Castelan MD;  Location:  GI     ESOPHAGOSCOPY, GASTROSCOPY, DUODENOSCOPY (EGD), COMBINED N/A 7/28/2018    Procedure: COMBINED  ESOPHAGOSCOPY, GASTROSCOPY, DUODENOSCOPY (EGD), REMOVE FOREIGN BODY;  COMBINED ESOPHAGOSCOPY, GASTROSCOPY, DUODENOSCOPY (EGD), REMOVE FOREIGN BODY;  Surgeon: Brice Guzmán MD;  [facility]     ESOPHAGOSCOPY, GASTROSCOPY, DUODENOSCOPY (EGD), COMBINED N/A 7/31/2018    Procedure: COMBINED ESOPHAGOSCOPY, GASTROSCOPY, DUODENOSCOPY (EGD);  COMBINED ESOPHAGOSCOPY, GASTROSCOPY, DUODENOSCOPY (EGD) TO REMOVE FOREIGN BODY;  Surgeon: Lokesh Paula MD;  [facility]     ESOPHAGOSCOPY, GASTROSCOPY, DUODENOSCOPY (EGD), COMBINED N/A 8/4/2018    Procedure: COMBINED ESOPHAGOSCOPY, GASTROSCOPY, DUODENOSCOPY (EGD), REMOVE FOREIGN BODY;   combined esophagoscopy, gastroscopy, duodenoscopy, REMOVE FOREIGN BODY ;  Surgeon: Lokesh Paula MD;  [facility]     ESOPHAGOSCOPY, GASTROSCOPY, DUODENOSCOPY (EGD), COMBINED N/A 10/6/2019    Procedure: ESOPHAGOGASTRODUODENOSCOPY (EGD) with fireign body removal x2, esophageal stent placement with floroscopy;  Surgeon: Timoteo Espana MD;  [facility]     ESOPHAGOSCOPY, GASTROSCOPY, DUODENOSCOPY (EGD), COMBINED N/A 12/2/2019    Procedure: Esophagogastroduodenoscopy with esophageal stent removal, esophogram;  Surgeon: Kailee Tena MD;  [facility]     ESOPHAGOSCOPY, GASTROSCOPY, DUODENOSCOPY (EGD), COMBINED N/A 12/17/2019    Procedure: ESOPHAGOGASTRODUODENOSCOPY, WITH FOREIGN BODY REMOVAL;  Surgeon: Pamela Perez MD;  [facility]     ESOPHAGOSCOPY, GASTROSCOPY, DUODENOSCOPY (EGD), COMBINED N/A 12/13/2019    Procedure: ESOPHAGOGASTRODUODENOSCOPY, WITH FOREIGN BODY REMOVAL;  Surgeon: Samia Stanton MD;  [facility]     ESOPHAGOSCOPY, GASTROSCOPY, DUODENOSCOPY (EGD), COMBINED N/A 12/28/2019    Procedure: ESOPHAGOGASTRODUODENOSCOPY (EGD) Removal of Foreign Body X 2;  Surgeon: Huy Kelley MD;  [facility]     ESOPHAGOSCOPY, GASTROSCOPY, DUODENOSCOPY (EGD), COMBINED N/A 1/5/2020    Procedure: ESOPHAGOGASTRODUOENOSCOPY WITH  FOREIGN BODY REMOVAL;  Surgeon: Pamela Perez MD;  [facility]     ESOPHAGOSCOPY, GASTROSCOPY, DUODENOSCOPY (EGD), COMBINED N/A 1/3/2020    Procedure: ESOPHAGOGASTRODUODENOSCOPY (EGD) REMOVAL OF FOREIGN BODY.;  Surgeon: Pamela Perez MD;  [facility]     ESOPHAGOSCOPY, GASTROSCOPY, DUODENOSCOPY (EGD), COMBINED N/A 1/13/2020    Procedure: ESOPHAGOGASTRODUODENOSCOPY (EGD) for foreign body removal;  Surgeon: Lokesh Paula MD;  [facility]     ESOPHAGOSCOPY, GASTROSCOPY, DUODENOSCOPY (EGD), COMBINED N/A 1/18/2020    Procedure: Diagnostic ESOPHAGOGASTRODUODENOSCOPY (EGD;  Surgeon: Lokesh Paula MD;  [facility]     ESOPHAGOSCOPY, GASTROSCOPY, DUODENOSCOPY (EGD), COMBINED N/A 3/29/2020    Procedure: UPPER ENDOSCOPY WITH FOREIGN BODY REMOVAL;  Surgeon: Doug Hansen MD;  [facility]     ESOPHAGOSCOPY, GASTROSCOPY, DUODENOSCOPY (EGD), COMBINED N/A 7/11/2020    Procedure: ESOPHAGOGASTRODUODENOSCOPY (EGD); Upper Endoscopy WITH FOREIGN BODY REMOVAL;  Surgeon: Lyndsey Mendoza DO;  [facility]     ESOPHAGOSCOPY, GASTROSCOPY, DUODENOSCOPY (EGD), COMBINED N/A 7/29/2020    Procedure: ESOPHAGOGASTRODUODENOSCOPY REMOVAL OF FOREIGN BODY;  Surgeon: Samia Stanton MD;  [facility]     ESOPHAGOSCOPY, GASTROSCOPY, DUODENOSCOPY (EGD), COMBINED N/A 8/1/2020    Procedure: ESOPHAGOGASTRODUODENOSCOPY, WITH FOREIGN BODY REMOVAL;  Surgeon: Pamela Perez MD;  [facility]     ESOPHAGOSCOPY, GASTROSCOPY, DUODENOSCOPY (EGD), COMBINED N/A 8/18/2020    Procedure: ESOPHAGOGASTRODUODENOSCOPY (EGD) for foreign body removal;  Surgeon: Pamela Perez MD;  [facility]     ESOPHAGOSCOPY, GASTROSCOPY, DUODENOSCOPY (EGD), COMBINED N/A 8/27/2020    Procedure: ESOPHAGOGASTRODUODENOSCOPY (EGD) with foreign body removal;  Surgeon: Campbell Rogers MD;  [facility]     ESOPHAGOSCOPY, GASTROSCOPY, DUODENOSCOPY (EGD), COMBINED  N/A 9/18/2020    Procedure: ESOPHAGOGASTRODUODENOSCOPY (EGD) with foreign body removal;  Surgeon: Dick Gillis MD;  [facility]     EXAM UNDER ANESTHESIA ANUS N/A 1/10/2017    Procedure: EXAM UNDER ANESTHESIA ANUS;  Surgeon: Annmarie Haynes MD;  [facility]     EXAM UNDER ANESTHESIA RECTUM N/A 7/19/2018    Procedure: EXAM UNDER ANESTHESIA RECTUM;  EXAM UNDER ANESTHESIA, REMOVAL OF RECTAL FOREIGN BODY;  Surgeon: Annmarie Haynes MD;  [facility]     HC REMOVE FECAL IMPACTION OR FB W ANESTHESIA N/A 12/18/2016    Procedure: REMOVE FECAL IMPACTION/FOREIGN BODY UNDER ANESTHESIA;  Surgeon: Soham Cano MD;  [facility]     KNEE SURGERY - removed a small tissue mass from knee Right      LAPAROSCOPIC ABLATION ENDOMETRIOSIS       LAPAROTOMY EXPLORATORY N/A 1/10/2017    Procedure: LAPAROTOMY EXPLORATORY;  Surgeon: Annmarie Haynes MD;  [facility]: [facility]     LAPAROTOMY EXPLORATORY  09/11/2019    Manual manipulation of bowel to remove pill bottle in rectum     lymph nodes removed from neck; benign  age 7     MAMMOPLASTY REDUCTION Bilateral      RELEASE CARPAL TUNNEL Bilateral      SIGMOIDOSCOPY FLEXIBLE N/A 1/10/2017    Procedure: SIGMOIDOSCOPY FLEXIBLE;  Surgeon: Annmarie Haynes MD;  [facility]     SIGMOIDOSCOPY FLEXIBLE N/A 5/8/2018    Procedure: SIGMOIDOSCOPY FLEXIBLE;  flex sig with foreign body removal using snare and rattooth forcep;  Surgeon: Soham Cano MD;  Location:  [facility]     SIGMOIDOSCOPY FLEXIBLE N/A 2/20/2019    Procedure: Exam under anesthesia Colonoscopy with attempted  removal of rectal foreign body;  Surgeon: Estrada Chávez MD;  Location[facility]      Allergies   Allergen Reactions     Amoxicillin-Pot Clavulanate Other (See Comments), Swelling and Rash     PN: facial swelling, left side. Also had cortisone injection the same day as she started the Augmentin.  Noted in downtime recovery of chart.    PN: facial swelling, left side. Also had  cortisone injection the same day as she started the Augmentin.; HUT Comment: PN: facial swelling, left side. Also had cortisone injection the same day as she started the Augmentin.Noted in downtime recovery of chart.; HUT Reaction: Rash; HUT Reaction: Unknown; HUT Reaction Type: Allergy; HUT Severity: Med; HUT Noted: [id number]     Oseltamivir Hives     med stopped, PN: med stopped  med stopped, PN: med stopped; HUT Comment: med stopped, PN: med stopped; HUT Reaction: Hives; HUT Reaction Type: Allergy; HUT Severity: Med; HUT Noted: [id number]     Penicillins Anaphylaxis     HUT Reaction: Anaphylaxis; HUT Reaction Type: Allergy; HUT Severity: High; HUT Noted: [id number]     Vancomycin Itching, Swelling and Rash     Other reaction(s): Redness  Flushed face, very itchy; HUT Comment: Flushed face, very itchy; HUT Reaction: Angioedema; HUT Reaction: Redness; HUT Severity: Med; HUT Noted: [id number]    facial     Hydrocodone Nausea and Vomiting and GI Disturbance     vomiting for days, PN: vomiting for days; HUT Comment: vomiting for days; HUT Reaction: Gastrointestinal; HUT Reaction: Nausea And Vomiting; HUT Reaction Type: Intolerance; HUT Severity: Med; HUT Noted: [id number]  vomiting for days       Blood-Group Specific Substance Other (See Comments)     Patient has an anti-Cw and non-specific antibodies. Blood product orders may be delayed. Draw one red top and two purple top tubes for all type/screen/crossmatch orders.  Patient has anti-Cw, anti-K ([place]), Warm auto and nonspecific antibodies. Blood products may be delayed. Draw patient 24 hours prior to transfusion. Draw one red top and two purple top tubes for all type and screen orders.     Oxycodone Swelling     Cephalosporins Rash     Influenza Vaccines Other (See Comments) and Nausea and Vomiting     HUT Reaction: Nausea And Vomiting; HUT Reaction Type: Intolerance; HUT Severity: Low; HUT Noted: [id number]     Lamotrigine Rash     Possibly associated with Lamictal.   HUT  Comment: Possibly associated with Lamictal. ; HUT Reaction: Rash; HUT Reaction Type: Allergy; HUT Severity: Low; HUT Noted: [id number]     Latex Rash     HUT Reaction: Rash; HUT Reaction Type: Allergy; HUT Severity: Low; HUT Noted: [id number]        Anesthesia Evaluation     . Pt has had prior anesthetic.     No history of anesthetic complications          ROS/MED HX    ENT/Pulmonary:       Neurologic:       Cardiovascular:         METS/Exercise Tolerance:     Hematologic:     (+) History of blood clots -      Musculoskeletal:         GI/Hepatic: Comment: Foreign body ingestion    (+) GERD       Renal/Genitourinary:      (-) renal disease   Endo:     (+) Obesity, .      Psychiatric:     (+) psychiatric history anxiety, depression and bipolar      Infectious Disease:         Malignancy:         Other:                     [facility] AN PHYSICAL EXAM    Assessment:   ASA SCORE: 2      Smoking Status:  Non-Smoker/Unknown        Plan:   Anes. Type:  General   Pre-Medication: None   Induction:  IV (RSI)   Airway: ETT; Oral   Access/Monitoring: PIV   Maintenance: Balanced     Postop Plan:   Postop Pain: Opioids  Postop Sedation/Airway: Not planned     PONV Management:   Adult Risk Factors: Female, Non-Smoker, Postop Opioids   Prevention: Ondansetron, Dexamethasone                   Paris Schilling MD

## 2020-11-18 NOTE — ED PROVIDER NOTES
ED Provider Note  [facility]      History     Chief Complaint   Patient presents with     Swallowed Foreign Body     The history is provided by the patient and medical records.     Nevni Alvarado is a 30 year old female with a medical history significant for bipolar disorder, ADD, PTSD and frequent presentations for foreign body ingestion who presents to the Emergency Department for evaluation after swallowing a foreign body.  Patient reports that she has struggled with swallowing foreign bodies for many years.  She states that she was doing well for 2 months, but began having difficulty once again today.  However, per review of patient's chart, patient was hospitalized at [facility] from 10/7/2020 to 10/9/2020 after ingestion of 36 pills of diphenhydramine and 2 mickie pins.  At that time, an EGD was performed and successfully removed the pins.    Today, she reports that she was doing well as she changed her mind set and removed everything from her house that she could swallow.  Patient was going through some old artwork today that she used to do, she reports that one of the pieces had a small metal wire on it.  Patient estimates that it was maybe 6 inches in length.  Patient states that this triggered her urge to swallow foreign bodies and she swallowed this metal piece approximately 1.5 hours prior to arrival.    Patient reports that she currently lives alone.  Patient states that swallowing the foreign object was not an attempt to harm herself or kill herself.  She says that she has urges that she simply cannot control.  Patient denies any abdominal pain currently in the Emergency Department.  She reports that she last ate/drank this evening at approximately 7:30 PM.  She denies any fevers, chills, runny nose, congestion, sore throat, cough, shortness of breath, chest pain, nausea, vomiting or urinary symptoms.  Patient reports that her last menstrual period  was approximately 7 months ago, she denies any chance of pregnancy as she states she was last sexually active in 2018.  The patient does note that swallowing foreign objects in the past has been complicated by an esophageal perforation which required stent placement.  Patient reports that the stent was removed in 1/2020.  Patient states that since the removal she has had some intermittent problems with swallowing food.  Patient also notes that she underwent a laparoscopic cholecystectomy on 11/5/2020.    Past Medical History  Past Medical History:   Diagnosis Date     ADD (attention deficit disorder)      Anorexia nervosa with bulimia     history of; on Topamax     Anxiety      Borderline personality disorder (H)      Depression      H/O self-harm      History of pulmonary embolism 12/2019    Provoked. Completed 3 month course of Apixaban     Morbid obesity (H)      Neuropathy      PTSD (post-traumatic stress disorder)      Rectal foreign body - Recurrent issue, self placed      Suicide attempt (H) 2/21/2018     Swallowed foreign body - Recurrent issue, self placed      Past Surgical History:   Procedure Laterality Date     COMBINED ESOPHAGOSCOPY, GASTROSCOPY, DUODENOSCOPY (EGD), REPLACE ESOPHAGEAL STENT N/A 10/9/2019    Procedure: Upper Endoscopy with Suture Placement;  Surgeon: Zurdo Ramirez MD;  [facility]     ESOPHAGOSCOPY, GASTROSCOPY, DUODENOSCOPY (EGD), COMBINED N/A 3/9/2017    Procedure: COMBINED ESOPHAGOSCOPY, GASTROSCOPY, DUODENOSCOPY (EGD), REMOVE FOREIGN BODY;  Surgeon: Avis Guzmán MD;  [facility]     ESOPHAGOSCOPY, GASTROSCOPY, DUODENOSCOPY (EGD), COMBINED N/A 4/20/2017    Procedure: COMBINED ESOPHAGOSCOPY, GASTROSCOPY, DUODENOSCOPY (EGD), REMOVE FOREIGN BODY;  EGD removal Foregin body;  Surgeon: Lokesh Paula MD;  [facility]     ESOPHAGOSCOPY, GASTROSCOPY, DUODENOSCOPY (EGD), COMBINED N/A 6/12/2017    Procedure: COMBINED ESOPHAGOSCOPY, GASTROSCOPY,  DUODENOSCOPY (EGD);  COMBINED ESOPHAGOSCOPY, GASTROSCOPY, DUODENOSCOPY (EGD) [[id number]]attempted removal of foreign body;  Surgeon: Pamela Perez MD;  [facility]     ESOPHAGOSCOPY, GASTROSCOPY, DUODENOSCOPY (EGD), COMBINED N/A 6/9/2017    Procedure: COMBINED ESOPHAGOSCOPY, GASTROSCOPY, DUODENOSCOPY (EGD), REMOVE FOREIGN BODY;  Esophagoscopy, Gastroscopy, Duodenoscopy, Removal of Foreign Body;  Surgeon: Dejon Marsh MD;  [facility]     ESOPHAGOSCOPY, GASTROSCOPY, DUODENOSCOPY (EGD), COMBINED N/A 1/6/2018    Procedure: COMBINED ESOPHAGOSCOPY, GASTROSCOPY, DUODENOSCOPY (EGD), REMOVE FOREIGN BODY;  COMBINED ESOPHAGOSCOPY, GASTROSCOPY, DUODENOSCOPY (EGD) [by pascal net and snare with profol sedation;  Surgeon: Feliciano Emmanuel MD;  Location:  GI     ESOPHAGOSCOPY, GASTROSCOPY, DUODENOSCOPY (EGD), COMBINED N/A 3/19/2018    Procedure: COMBINED ESOPHAGOSCOPY, GASTROSCOPY, DUODENOSCOPY (EGD), REMOVE FOREIGN BODY;   Esophagodscopy, Gastroscopy, Duodenoscopy,Foreign Body Removal;  Surgeon: Brice Guzmán MD;  [facility]     ESOPHAGOSCOPY, GASTROSCOPY, DUODENOSCOPY (EGD), COMBINED N/A 4/16/2018    Procedure: COMBINED ESOPHAGOSCOPY, GASTROSCOPY, DUODENOSCOPY (EGD), REMOVE FOREIGN BODY;  Esophagogastroduodenoscopy  Foreign Body Removal X 2;  Surgeon: Royer Moise MD;  [facility]     ESOPHAGOSCOPY, GASTROSCOPY, DUODENOSCOPY (EGD), COMBINED N/A 6/1/2018    Procedure: COMBINED ESOPHAGOSCOPY, GASTROSCOPY, DUODENOSCOPY (EGD), REMOVE FOREIGN BODY;  COMBINED ESOPHAGOSCOPY, GASTROSCOPY, DUODENOSCOPY with removal of foreign body, propofol sedation by anesthesia;  Surgeon: Brice Martinez MD;  Location:  GI     ESOPHAGOSCOPY, GASTROSCOPY, DUODENOSCOPY (EGD), COMBINED N/A 7/25/2018    Procedure: COMBINED ESOPHAGOSCOPY, GASTROSCOPY, DUODENOSCOPY (EGD), REMOVE FOREIGN BODY;;  Surgeon: Candy Castelan MD;  Location:  GI     ESOPHAGOSCOPY, GASTROSCOPY,  DUODENOSCOPY (EGD), COMBINED N/A 7/28/2018    Procedure: COMBINED ESOPHAGOSCOPY, GASTROSCOPY, DUODENOSCOPY (EGD), REMOVE FOREIGN BODY;  COMBINED ESOPHAGOSCOPY, GASTROSCOPY, DUODENOSCOPY (EGD), REMOVE FOREIGN BODY;  Surgeon: Brice Guzmán MD;  [facility]     ESOPHAGOSCOPY, GASTROSCOPY, DUODENOSCOPY (EGD), COMBINED N/A 7/31/2018    Procedure: COMBINED ESOPHAGOSCOPY, GASTROSCOPY, DUODENOSCOPY (EGD);  COMBINED ESOPHAGOSCOPY, GASTROSCOPY, DUODENOSCOPY (EGD) TO REMOVE FOREIGN BODY;  Surgeon: Lokesh Paula MD;  [facility]     ESOPHAGOSCOPY, GASTROSCOPY, DUODENOSCOPY (EGD), COMBINED N/A 8/4/2018    Procedure: COMBINED ESOPHAGOSCOPY, GASTROSCOPY, DUODENOSCOPY (EGD), REMOVE FOREIGN BODY;   combined esophagoscopy, gastroscopy, duodenoscopy, REMOVE FOREIGN BODY ;  Surgeon: Lokesh Paula MD;  [facility]     ESOPHAGOSCOPY, GASTROSCOPY, DUODENOSCOPY (EGD), COMBINED N/A 10/6/2019    Procedure: ESOPHAGOGASTRODUODENOSCOPY (EGD) with fireign body removal x2, esophageal stent placement with floroscopy;  Surgeon: Timoteo Espana MD;  [facility]     ESOPHAGOSCOPY, GASTROSCOPY, DUODENOSCOPY (EGD), COMBINED N/A 12/2/2019    Procedure: Esophagogastroduodenoscopy with esophageal stent removal, esophogram;  Surgeon: Kailee Tena MD;  [facility]     ESOPHAGOSCOPY, GASTROSCOPY, DUODENOSCOPY (EGD), COMBINED N/A 12/17/2019    Procedure: ESOPHAGOGASTRODUODENOSCOPY, WITH FOREIGN BODY REMOVAL;  Surgeon: Pamela Perez MD;  [facility]     ESOPHAGOSCOPY, GASTROSCOPY, DUODENOSCOPY (EGD), COMBINED N/A 12/13/2019    Procedure: ESOPHAGOGASTRODUODENOSCOPY, WITH FOREIGN BODY REMOVAL;  Surgeon: Samia Stanton MD;  [facility]     ESOPHAGOSCOPY, GASTROSCOPY, DUODENOSCOPY (EGD), COMBINED N/A 12/28/2019    Procedure: ESOPHAGOGASTRODUODENOSCOPY (EGD) Removal of Foreign Body X 2;  Surgeon: Huy Kelley MD;  [facility]     ESOPHAGOSCOPY, GASTROSCOPY, DUODENOSCOPY (EGD),  COMBINED N/A 1/5/2020    Procedure: ESOPHAGOGASTRODUOENOSCOPY WITH FOREIGN BODY REMOVAL;  Surgeon: Pamela Perez MD;  [facility]     ESOPHAGOSCOPY, GASTROSCOPY, DUODENOSCOPY (EGD), COMBINED N/A 1/3/2020    Procedure: ESOPHAGOGASTRODUODENOSCOPY (EGD) REMOVAL OF FOREIGN BODY.;  Surgeon: Pamela Perez MD;  [facility]     ESOPHAGOSCOPY, GASTROSCOPY, DUODENOSCOPY (EGD), COMBINED N/A 1/13/2020    Procedure: ESOPHAGOGASTRODUODENOSCOPY (EGD) for foreign body removal;  Surgeon: Lokesh Paula MD;  [facility]     ESOPHAGOSCOPY, GASTROSCOPY, DUODENOSCOPY (EGD), COMBINED N/A 1/18/2020    Procedure: Diagnostic ESOPHAGOGASTRODUODENOSCOPY (EGD;  Surgeon: Lokesh Paula MD;  [facility]     ESOPHAGOSCOPY, GASTROSCOPY, DUODENOSCOPY (EGD), COMBINED N/A 3/29/2020    Procedure: UPPER ENDOSCOPY WITH FOREIGN BODY REMOVAL;  Surgeon: Doug Hansen MD;  [facility]     ESOPHAGOSCOPY, GASTROSCOPY, DUODENOSCOPY (EGD), COMBINED N/A 7/11/2020    Procedure: ESOPHAGOGASTRODUODENOSCOPY (EGD); Upper Endoscopy WITH FOREIGN BODY REMOVAL;  Surgeon: Lyndsey Mendoza DO;  [facility]     ESOPHAGOSCOPY, GASTROSCOPY, DUODENOSCOPY (EGD), COMBINED N/A 7/29/2020    Procedure: ESOPHAGOGASTRODUODENOSCOPY REMOVAL OF FOREIGN BODY;  Surgeon: Samia Stanton MD;  [facility]     ESOPHAGOSCOPY, GASTROSCOPY, DUODENOSCOPY (EGD), COMBINED N/A 8/1/2020    Procedure: ESOPHAGOGASTRODUODENOSCOPY, WITH FOREIGN BODY REMOVAL;  Surgeon: Pamela Perez MD;  [facility]     ESOPHAGOSCOPY, GASTROSCOPY, DUODENOSCOPY (EGD), COMBINED N/A 8/18/2020    Procedure: ESOPHAGOGASTRODUODENOSCOPY (EGD) for foreign body removal;  Surgeon: Pamela Perez MD;  [facility]     ESOPHAGOSCOPY, GASTROSCOPY, DUODENOSCOPY (EGD), COMBINED N/A 8/27/2020    Procedure: ESOPHAGOGASTRODUODENOSCOPY (EGD) with foreign body removal;  Surgeon: Campbell Rogers MD;  Location:   OR     ESOPHAGOSCOPY, GASTROSCOPY, DUODENOSCOPY (EGD), COMBINED N/A 9/18/2020    Procedure: ESOPHAGOGASTRODUODENOSCOPY (EGD) with foreign body removal;  Surgeon: Dick Gillis MD;  [facility]     EXAM UNDER ANESTHESIA ANUS N/A 1/10/2017    Procedure: EXAM UNDER ANESTHESIA ANUS;  Surgeon: Annmarie Haynes MD;  [facility]     EXAM UNDER ANESTHESIA RECTUM N/A 7/19/2018    Procedure: EXAM UNDER ANESTHESIA RECTUM;  EXAM UNDER ANESTHESIA, REMOVAL OF RECTAL FOREIGN BODY;  Surgeon: Annmarie Haynes MD;  [facility]     HC REMOVE FECAL IMPACTION OR FB W ANESTHESIA N/A 12/18/2016    Procedure: REMOVE FECAL IMPACTION/FOREIGN BODY UNDER ANESTHESIA;  Surgeon: Soham Cano MD;  [facility]     KNEE SURGERY - removed a small tissue mass from knee Right      LAPAROSCOPIC ABLATION ENDOMETRIOSIS       LAPAROTOMY EXPLORATORY N/A 1/10/2017    Procedure: LAPAROTOMY EXPLORATORY;  Surgeon: Annmarie Haynes MD;  [facility]     LAPAROTOMY EXPLORATORY  09/11/2019    Manual manipulation of bowel to remove pill bottle in rectum     lymph nodes removed from neck; benign  age 7     MAMMOPLASTY REDUCTION Bilateral      RELEASE CARPAL TUNNEL Bilateral      SIGMOIDOSCOPY FLEXIBLE N/A 1/10/2017    Procedure: SIGMOIDOSCOPY FLEXIBLE;  Surgeon: Annmarie Haynes MD;  [facility]     SIGMOIDOSCOPY FLEXIBLE N/A 5/8/2018    Procedure: SIGMOIDOSCOPY FLEXIBLE;  flex sig with foreign body removal using snare and rattooth forcep;  Surgeon: Soham Cano MD;  Location:  GI     SIGMOIDOSCOPY FLEXIBLE N/A 2/20/2019    Procedure: Exam under anesthesia Colonoscopy with attempted  removal of rectal foreign body;  Surgeon: Estrada Chávez MD;  [facility]          albuterol (PROAIR HFA/PROVENTIL HFA/VENTOLIN HFA) 108 (90 Base) MCG/ACT inhaler       busPIRone (BUSPAR) 15 MG tablet       Cholecalciferol (VITAMIN D) 50 MCG (2000 UT) CAPS       cloNIDine (CATAPRES) 0.1 MG tablet        desvenlafaxine (PRISTIQ) 100 MG 24 hr tablet       docosanol (ABREVA) 10 % CREA cream       hydroxychloroquine (PLAQUENIL) 200 MG tablet       lurasidone (LATUDA) 60 MG TABS tablet       metFORMIN (GLUCOPHAGE-XR) 500 MG 24 hr tablet       pregabalin (LYRICA) 50 MG capsule       Prenatal Vit-Fe Fumarate-FA (PNV PRENATAL PLUS MULTIVITAMIN) 27-1 MG TABS per tablet       valACYclovir (VALTREX) 1000 mg tablet      Allergies   Allergen Reactions     Amoxicillin-Pot Clavulanate Other (See Comments), Swelling and Rash     PN: facial swelling, left side. Also had cortisone injection the same day as she started the Augmentin.  Noted in downtime recovery of chart.    PN: facial swelling, left side. Also had cortisone injection the same day as she started the Augmentin.; HUT Comment: PN: facial swelling, left side. Also had cortisone injection the same day as she started the Augmentin.Noted in downtime recovery of chart.; HUT Reaction: Rash; HUT Reaction: Unknown; HUT Reaction Type: Allergy; HUT Severity: Med; HUT Noted: [id number]     Oseltamivir Hives     med stopped, PN: med stopped  med stopped, PN: med stopped; HUT Comment: med stopped, PN: med stopped; HUT Reaction: Hives; HUT Reaction Type: Allergy; HUT Severity: Med; HUT Noted: [id number]     Penicillins Anaphylaxis     HUT Reaction: Anaphylaxis; HUT Reaction Type: Allergy; HUT Severity: High; HUT Noted: [id number]     Vancomycin Itching, Swelling and Rash     Other reaction(s): Redness  Flushed face, very itchy; HUT Comment: Flushed face, very itchy; HUT Reaction: Angioedema; HUT Reaction: Redness; HUT Severity: Med; HUT Noted: [id number]    facial     Hydrocodone Nausea and Vomiting and GI Disturbance     vomiting for days, PN: vomiting for days; HUT Comment: vomiting for days; HUT Reaction: Gastrointestinal; HUT Reaction: Nausea And Vomiting; HUT Reaction Type: Intolerance; HUT Severity: Med; HUT Noted: [id number]  vomiting for days       Blood-Group Specific Substance Other  (See Comments)     Patient has an anti-Cw and non-specific antibodies. Blood product orders may be delayed. Draw one red top and two purple top tubes for all type/screen/crossmatch orders.  Patient has anti-Cw, anti-K ([place]), Warm auto and nonspecific antibodies. Blood products may be delayed. Draw patient 24 hours prior to transfusion. Draw one red top and two purple top tubes for all type and screen orders.     Oxycodone Swelling     Cephalosporins Rash     Influenza Vaccines Other (See Comments) and Nausea and Vomiting     HUT Reaction: Nausea And Vomiting; HUT Reaction Type: Intolerance; HUT Severity: Low; HUT Noted: [id number]     Lamotrigine Rash     Possibly associated with Lamictal.   HUT Comment: Possibly associated with Lamictal. ; HUT Reaction: Rash; HUT Reaction Type: Allergy; HUT Severity: Low; HUT Noted: [id number]     Latex Rash     HUT Reaction: Rash; HUT Reaction Type: Allergy; HUT Severity: Low; HUT Noted: [id number]     Family History  Family History   Problem Relation Age of Onset     Diabetes Type 2  Maternal Grandmother      Diabetes Type 2  Paternal Grandmother      Breast Cancer Paternal Grandmother      Cerebrovascular Disease Father 53     Myocardial Infarction No family hx of      Coronary Artery Disease Early Onset No family hx of      Ovarian Cancer No family hx of      Colon Cancer No family hx of      Social History   Social History     Tobacco Use     Smoking status: Never Smoker     Smokeless tobacco: Never Used   Substance Use Topics     Alcohol use: No     Alcohol/week: 0.0 standard drinks     Drug use: No      Past medical history, past surgical history, medications, allergies, family history, and social history were reviewed with the patient. No additional pertinent items.       Review of Systems   Constitutional: Negative for chills and fever.   HENT: Negative for congestion, rhinorrhea and sore throat.    Respiratory: Negative for cough and shortness of breath.    Cardiovascular:  Negative for chest pain.   Gastrointestinal: Negative for abdominal pain, nausea and vomiting.   Genitourinary: Negative for decreased urine volume, difficulty urinating, dysuria, frequency, hematuria and urgency.   Psychiatric/Behavioral: Negative for self-injury and suicidal ideas.   All other systems reviewed and are negative.      Physical Exam   BP: (!) 142/74  Pulse: 90  Temp: 97  F (36.1  C)  Resp: 16  SpO2: 99 %  Physical Exam  Vitals signs and nursing note reviewed.   Constitutional:       General: She is not in acute distress.     Appearance: She is not diaphoretic.      Comments: Adult female, alert, cooperative, NAD   HENT:      Head: Atraumatic.      Mouth/Throat:      Pharynx: No oropharyngeal exudate.   Eyes:      General: No scleral icterus.     Pupils: Pupils are equal, round, and reactive to light.   Cardiovascular:      Rate and Rhythm: Normal rate.      Heart sounds: Normal heart sounds.   Pulmonary:      Effort: No respiratory distress.      Breath sounds: Normal breath sounds.   Abdominal:      General: Bowel sounds are normal.      Palpations: Abdomen is soft.      Tenderness: There is no abdominal tenderness.      Comments: Obese, soft, nontender.  Healing port sites/incisions from recent lap adrianna.    Musculoskeletal:         General: No tenderness.   Skin:     General: Skin is warm.      Findings: No rash.   Psychiatric:      Comments: Alert, cooperative.  Denies SI/HI.  No sign of psychosis.  Insight moderate. Reactive affect.          ED Course      Procedures     11:16 PM  The patient was seen and examined by Jolly Ravi MD in Room ED08.                            Results for orders placed or performed during the hospital encounter of 11/17/20   XR Abdomen Port 1 View     Status: None (Preliminary result)    Narrative    EXAM: XR ABDOMEN PORT 1 VW  LOCATION: [facility]  DATE/TIME: 11/17/2020 11:28 PM    INDICATION: Evaluate for foreign body. Patient swallowed metal  wire.    COMPARISON: 11/7/2020.      Impression    IMPRESSION: There is a new, thin 9 cm long metallic density compatible with a wire projecting over the left upper abdomen. This is probably the swallowed foreign body/wire, likely within the stomach. Right upper quadrant surgical clips. Normal bowel gas   pattern. Thoracolumbar scoliosis.   Asymptomatic COVID-19 Virus (Coronavirus) by PCR     Status: None    Specimen: Nasopharyngeal   Result Value Ref Range    COVID-19 Virus PCR to [facility] - Source Nasopharyngeal     COVID-19 Virus PCR to [facility] - Result       Test received-See reflex to IDDL test SARS CoV2 (COVID-19) Virus RT-PCR   CBC with platelets differential     Status: Abnormal   Result Value Ref Range    WBC 8.5 4.0 - 11.0 10e9/L    RBC Count 3.82 3.8 - 5.2 10e12/L    Hemoglobin 10.6 (L) 11.7 - 15.7 g/dL    Hematocrit 33.3 (L) 35.0 - 47.0 %    MCV 87 78 - 100 fl    MCH 27.7 26.5 - 33.0 pg    MCHC 31.8 31.5 - 36.5 g/dL    RDW 13.7 10.0 - 15.0 %    Platelet Count 282 150 - 450 10e9/L    Diff Method Automated Method     % Neutrophils 63.3 %    % Lymphocytes 22.3 %    % Monocytes 7.6 %    % Eosinophils 5.8 %    % Basophils 0.8 %    % Immature Granulocytes 0.2 %    Nucleated RBCs 0 0 /100    Absolute Neutrophil 5.4 1.6 - 8.3 10e9/L    Absolute Lymphocytes 1.9 0.8 - 5.3 10e9/L    Absolute Monocytes 0.6 0.0 - 1.3 10e9/L    Absolute Eosinophils 0.5 0.0 - 0.7 10e9/L    Absolute Basophils 0.1 0.0 - 0.2 10e9/L    Abs Immature Granulocytes 0.0 0 - 0.4 10e9/L    Absolute Nucleated RBC 0.0    Basic metabolic panel     Status: Abnormal   Result Value Ref Range    Sodium 139 133 - 144 mmol/L    Potassium 3.4 3.4 - 5.3 mmol/L    Chloride 106 94 - 109 mmol/L    Carbon Dioxide 27 20 - 32 mmol/L    Anion Gap 6 3 - 14 mmol/L    Glucose 133 (H) 70 - 99 mg/dL    Urea Nitrogen 13 7 - 30 mg/dL    Creatinine 0.62 0.52 - 1.04 mg/dL    GFR Estimate >90 >60 mL/min/[1.73_m2]    GFR Estimate If Black >90 >60 mL/min/[1.73_m2]    Calcium  9.1 8.5 - 10.1 mg/dL   HCG qualitative pregnancy (blood)     Status: None   Result Value Ref Range    HCG Qualitative Serum Negative NEG^Negative     Medications - No data to display     Assessments & Plan (with Medical Decision Making)   Patient presents to the emergency department today after deliberately swallowing a metal wire.  She is a long history of swallowing objects due to compulsive urges to do so.  She today denies any intent to harm herself or kill herself.  This is very consistent with her prior presentations.    We did do an abdominal x-ray which shows a new thin 9 cm long metallic density compatible with a wire projecting over the upper abdomen, likely in the stomach.    Basic labs were obtained, CBC demonstrates mild anemia with a hemoglobin of 10.6, otherwise within normal limits, BMP demonstrates mild hyperglycemia with a glucose of 133, otherwise within normal limits, pregnancy test negative.  We did order a stat SARS-CoV-2 PCR swab prior to procedure.    GI was consulted at 11:55 PM.  They will plan to come in to evaluate her and scope to remove the foreign object.    After she has recovered from anesthesia, I would advocate a DEC assessment on her.  Although I do not believe that she was trying to harm herself and I do not believe she meets criteria for hospitalization, it may be beneficial for her to get set up with a day program or some intensive outpatient treatment. Discussed with patient's bedside nurse - after she recovers from anesthesia and can effectively participate in a DEC assessment, will have remote assessment done.     I have reviewed the nursing notes. I have reviewed the findings, diagnosis, plan and need for follow up with the patient.    New Prescriptions    No medications on file       Final diagnoses:   Swallowed foreign body, initial encounter       --  IMarcos, am serving as a trained medical scribe to document services personally performed by Jolly DICKSON  MD Trav, based on the provider's statements to me.     I, Jolly Ravi MD, was physically present and have reviewed and verified the accuracy of this note documented by Marcos Askew.    Jolly Ravi MD  [facility] EMERGENCY DEPARTMENT  11/17/2020     Jolly Ravi MD  11/18/20 0025

## 2020-11-18 NOTE — ANESTHESIA POSTPROCEDURE EVALUATION
Anesthesia POST Procedure Evaluation    Patient: Nevin Alvarado   MRN:     [id number] Gender:   female   Age:    30 year old :      1990        Preoperative Diagnosis: Swallowed foreign body, initial encounter [T18.9XXA]   Procedure(s):  ESOPHAGOGASTRODUODENOSCOPY, WITH FOREIGN BODY REMOVAL   Postop Comments: No value filed.     Anesthesia Type: General       Disposition: Outpatient   Postop Pain Control: Uneventful            Sign Out: Well controlled pain   PONV: No   Neuro/Psych: Uneventful            Sign Out: Acceptable/Baseline neuro status   Airway/Respiratory: Uneventful            Sign Out: Acceptable/Baseline resp. status   CV/Hemodynamics: Uneventful            Sign Out: Acceptable CV status   Other NRE: NONE   DID A NON-ROUTINE EVENT OCCUR? No         Last Anesthesia Record Vitals:  CRNA VITALS  2020 0159 - 2020 0259      2020             Pulse:  94    SpO2:  98 %          Last PACU Vitals:  Vitals Value Taken Time   /69 20 0310   Temp     Pulse 95 20 0314   Resp     SpO2 96 % 20 0315   Temp src     NIBP     Pulse 94 20 0231   SpO2 98 % 20 0231   Resp     Temp     Ht Rate     Temp 2     Vitals shown include unvalidated device data.      Electronically Signed By: Dejon Forde MD, 2020, 4:53 AM

## 2020-11-18 NOTE — BRIEF OP NOTE
[facility]     Brief Operative Note    Pre-operative diagnosis: Swallowed foreign body, initial encounter [T18.9XXA]  Post-operative diagnosis Same as pre-operative diagnosis    Procedure: Procedure(s):  ESOPHAGOGASTRODUODENOSCOPY, WITH FOREIGN BODY REMOVAL    Surgeon: Surgeon(s) and Role:     * Felipe Ulloa DO - Primary   LAUREANO, Sonam GRIMES - Fellow    Anesthesia: General   Estimated blood loss: None  Drains: None  Specimens: * No specimens in log *  Findings:   Foreign body (thin wire) found in the stomach. Successfully removed with rat tooth forceps. .  Complications: None.  Implants: * No implants in log *      Recommendations  - Return to ED after recovery from anesthesia for discharge home  - ok to start diet after complete anesthesia recovery      Sonam TINSLEY MD  Gastroenterology Fellow  Division of Gastroenterology, Hepatology and Nutrition  [facility]  Text page Pager [phone]

## 2020-11-18 NOTE — ED NOTES
Emergency Department Patient Sign-out       Brief HPI:  This is a 30 year old female signed out to me by Dr. Ravi .  See initial ED Provider note for details of the presentation.            Significant Events prior to my assuming care: Patient with swallowed foreign body. Swallowed something with metal wire. No SI. 9cm wire in stomach. GI coming to scope. DEC assessment after endoscopy.      Exam:   Patient Vitals for the past 24 hrs:   BP Temp Temp src Pulse Resp SpO2   11/17/20 2310 (!) 142/74 97  F (36.1  C) Oral 90 16 99 %           ED RESULTS:   Results for orders placed or performed during the hospital encounter of 11/17/20 (from the past 24 hour(s))   XR Abdomen Port 1 View     Status: None    Collection Time: 11/17/20 11:37 PM    Narrative    EXAM: XR ABDOMEN PORT 1 VW  LOCATION: [facility]  DATE/TIME: 11/17/2020 11:28 PM    INDICATION: Evaluate for foreign body. Patient swallowed metal wire.    COMPARISON: 11/7/2020.      Impression    IMPRESSION: There is a new, thin 9 cm long metallic density compatible with a wire projecting over the left upper abdomen. This is probably the swallowed foreign body/wire, likely within the stomach. Right upper quadrant surgical clips. Normal bowel gas   pattern. Thoracolumbar scoliosis.   CBC with platelets differential     Status: Abnormal    Collection Time: 11/17/20 11:49 PM   Result Value Ref Range    WBC 8.5 4.0 - 11.0 10e9/L    RBC Count 3.82 3.8 - 5.2 10e12/L    Hemoglobin 10.6 (L) 11.7 - 15.7 g/dL    Hematocrit 33.3 (L) 35.0 - 47.0 %    MCV 87 78 - 100 fl    MCH 27.7 26.5 - 33.0 pg    MCHC 31.8 31.5 - 36.5 g/dL    RDW 13.7 10.0 - 15.0 %    Platelet Count 282 150 - 450 10e9/L    Diff Method Automated Method     % Neutrophils 63.3 %    % Lymphocytes 22.3 %    % Monocytes 7.6 %    % Eosinophils 5.8 %    % Basophils 0.8 %    % Immature Granulocytes 0.2 %    Nucleated RBCs 0 0 /100    Absolute Neutrophil 5.4 1.6 - 8.3 10e9/L    Absolute Lymphocytes  1.9 0.8 - 5.3 10e9/L    Absolute Monocytes 0.6 0.0 - 1.3 10e9/L    Absolute Eosinophils 0.5 0.0 - 0.7 10e9/L    Absolute Basophils 0.1 0.0 - 0.2 10e9/L    Abs Immature Granulocytes 0.0 0 - 0.4 10e9/L    Absolute Nucleated RBC 0.0    Basic metabolic panel     Status: Abnormal    Collection Time: 11/17/20 11:49 PM   Result Value Ref Range    Sodium 139 133 - 144 mmol/L    Potassium 3.4 3.4 - 5.3 mmol/L    Chloride 106 94 - 109 mmol/L    Carbon Dioxide 27 20 - 32 mmol/L    Anion Gap 6 3 - 14 mmol/L    Glucose 133 (H) 70 - 99 mg/dL    Urea Nitrogen 13 7 - 30 mg/dL    Creatinine 0.62 0.52 - 1.04 mg/dL    GFR Estimate >90 >60 mL/min/[1.73_m2]    GFR Estimate If Black >90 >60 mL/min/[1.73_m2]    Calcium 9.1 8.5 - 10.1 mg/dL   HCG qualitative pregnancy (blood)     Status: None    Collection Time: 11/17/20 11:49 PM   Result Value Ref Range    HCG Qualitative Serum Negative NEG^Negative   Asymptomatic COVID-19 Virus (Coronavirus) by PCR     Status: None    Collection Time: 11/17/20 11:50 PM    Specimen: Nasopharyngeal   Result Value Ref Range    COVID-19 Virus PCR to [facility] - Source Nasopharyngeal     COVID-19 Virus PCR to [facility] - Result       Test received-See reflex to IDDL test SARS CoV2 (COVID-19) Virus RT-PCR       ED MEDICATIONS:   Medications - No data to display      Impression:    ICD-10-CM    1. Swallowed foreign body, initial encounter  T18.9XXA Asymptomatic COVID-19 Virus (Coronavirus) by PCR     CBC with platelets differential     Basic metabolic panel     HCG qualitative pregnancy (blood)     SARS-CoV-2 COVID-19 Virus (Coronavirus) RT-PCR     SARS-CoV-2 COVID-19 Virus (Coronavirus) RT-PCR       Plan:    Gastroenterology successfully removed the object.  Patient otherwise appears well, she is alert and oriented and tolerating p.o. intake.  A behavioral assessment was done, she does not need to be admitted to psychiatry.  An appointment was made at 10 AM today.  Patient will follow up.  She will return if  worsening..        MD Nas Hart Matthew Joseph, MD  11/18/20 0552

## 2020-11-18 NOTE — ED TRIAGE NOTES
Presents to ED with CC of swallowing a thin metal wire from a crafting set, about 6 inches long. States this is a compulsive behavior for her, but she has not engaged in it in about two months. She did state she was having thoughts about swallowing an object, but not thoughts specifically about harming herself. A&Ox4, anxious. Vitally stable in triage.

## 2020-11-18 NOTE — DOWNTIME EVENT NOTE
The EMR was down for 1 hour on 11/18/2020.    CARMEN Hart was responsible for completing the paper charting during this time period.     The following information was re-entered into the system by Ileana Kent RN: Flowsheet data, Orders and MAR    The following information will remain in the paper chart: flowsheet data, nora score, and progress note.    Ileana Kent RN  11/18/2020

## 2020-11-18 NOTE — ANESTHESIA CARE TRANSFER NOTE
Patient: Nevin Alvarado    Procedure(s):  ESOPHAGOGASTRODUODENOSCOPY, WITH FOREIGN BODY REMOVAL    Diagnosis: Swallowed foreign body, initial encounter [T18.9XXA]  Diagnosis Additional Information: No value filed.    Anesthesia Type:   General     Note:  Airway :Face Mask  Patient transferred to:PACU  Comments: VSS. Breathing spontaneously at a regular rate with adequate tidal volumes and maintaining O2 sats on 6L. Denies nausea, endorses throat pain. No apparent complications from anesthesia.     MD SVEN YuBS pager [phone]  Anesthesia CA-2  Handoff Report: Identifed the Patient, Identified the Reponsible Provider, Reviewed the pertinent medical history, Discussed the surgical course, Reviewed Intra-OP anesthesia mangement and issues during anesthesia, Set expectations for post-procedure period and Allowed opportunity for questions and acknowledgement of understanding      Vitals: (Last set prior to Anesthesia Care Transfer)    CRNA VITALS  11/18/2020 0159 - 11/18/2020 0259      11/18/2020             Pulse:  94    SpO2:  98 %                Electronically Signed By: Paris Schilling MD  November 18, 2020  4:37 AM

## 2020-11-18 NOTE — ANESTHESIA PROCEDURE NOTES
Airway   Date/Time: 11/18/2020 1:48 AM   Patient location during procedure: OR    Staff -   Performed By: resident    Indications and Patient Condition  Indications for airway management: isma-procedural  Induction type:RSIMask difficulty assessment: 1 - vent by mask    Final Airway Details  Final airway type: endotracheal airway  Successful airway:ETT - single  Endotracheal Airway Details   ETT size (mm): 7.0  Cuffed: yes  Successful intubation technique: video laryngoscopy  Grade View of Cords: 1  Adjucts: stylet  Measured from: lips  Secured at (cm): 24  Secured with: pink tape  Bite Block used: EGD bite block.    Post intubation assessment   Placement verified by: capnometry and chest rise   Number of attempts at approach: 4 or more  Secured with:pink tape  Ease of procedure: difficult  Dentition: Intact and UnchangedAdditional Comments  First attempt by resident with a MAC 3 blade had a grade 2b view - unable to pass the ETT easily. Bag mask ventilation - easy BVM. Second attempt by resident with Fournier blade - grade 3 view. Third attempt by Staff with Fournier blade grade 1 view - but cords closed. Rocuronium given. Fourth attempt by staff - poor view. BVM difficult - switched to 2 hands patient likely laryngospasm able to move are with 2 hands and increased positive pressure. Fifth attempt by resident with CMAC 3 blade grade 1 view ETT passed easily. Initially high airway pressures likely due to bronchospasm this improved quickly.

## 2020-11-18 NOTE — PROGRESS NOTES
Brief GI Note    29 yo F with hx of multiple foreign body ingestions presents again with another ingestion. Straightened out wire ingested. Has history of esophageal perforation in the past (10/2019) in the setting of similar ingestions and required stent placement, since removed.     - last EGD was 11/3 for dysphagia, dilated, no major change in swallowing  - before that, last foreign body ingestion was in sept 2020, with similar wire ingestion, removed here.  - has had more than 90 EGDs between various health systems since 2017    Currently denies any chest pain, discomfort, nausea, vomiting, difficulty swallowing. Is handling secretions.    Labs reviewed. Mildly anemic. No signs of bleeding.  BMP is normal.   COVID-19 is negative.    Discussed with patient that she needs an EGD. She wanted me to call her legal guardian to get formal consent, which I did and filed in the chart.      Consent obtained from Court Appointed Legal Guardian  Aaliyah MakennaJanie) Jasbir  [phone]      Plan for EGD under general anesthesia in the OR.    Sonam TINSLEY MD  Gastroenterology Fellow  Division of Gastroenterology, Hepatology and Nutrition  [facility]  Text page Pager [phone]

## 2020-11-27 ENCOUNTER — HOSPITAL ENCOUNTER (INPATIENT)
Facility: CLINIC | Age: 30
LOS: 2 days | Discharge: HOME | DRG: 883 | End: 2020-12-01
Attending: EMERGENCY MEDICINE | Admitting: PSYCHIATRY & NEUROLOGY
Payer: COMMERCIAL

## 2020-11-27 ENCOUNTER — APPOINTMENT (OUTPATIENT)
Dept: GENERAL RADIOLOGY | Facility: CLINIC | Age: 30
DRG: 883 | End: 2020-11-27
Attending: EMERGENCY MEDICINE
Payer: COMMERCIAL

## 2020-11-27 DIAGNOSIS — T18.8XXA FOREIGN BODY IN OTHER PARTS OF ALIMENTARY TRACT, INITIAL ENCOUNTER: ICD-10-CM

## 2020-11-27 DIAGNOSIS — T18.9XXA SWALLOWED FOREIGN BODY, INITIAL ENCOUNTER: Primary | ICD-10-CM

## 2020-11-27 DIAGNOSIS — Z20.828 EXPOSURE TO SARS-ASSOCIATED CORONAVIRUS: ICD-10-CM
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
## 2020-11-27 DIAGNOSIS — F60.3 EXPLOSIVE PERSONALITY DISORDER (H): ICD-10-CM

## 2020-11-27 DIAGNOSIS — F43.10 POSTTRAUMATIC STRESS DISORDER: ICD-10-CM
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
## 2020-11-27 DIAGNOSIS — F41.1 GENERALIZED ANXIETY DISORDER: ICD-10-CM

## 2020-11-27 LAB
ANION GAP SERPL CALCULATED.3IONS-SCNC: 6 MMOL/L (ref 3–14)
APAP SERPL-MCNC: <2 MG/L (ref 10–20)
BASOPHILS # BLD AUTO: 0 10E9/L (ref 0–0.2)
BASOPHILS NFR BLD AUTO: 0.4 %
BUN SERPL-MCNC: 9 MG/DL (ref 7–30)
CALCIUM SERPL-MCNC: 8.9 MG/DL (ref 8.5–10.1)
CHLORIDE SERPL-SCNC: 108 MMOL/L (ref 94–109)
CO2 SERPL-SCNC: 26 MMOL/L (ref 20–32)
CREAT SERPL-MCNC: 0.66 MG/DL (ref 0.52–1.04)
DIFFERENTIAL METHOD BLD: ABNORMAL
EOSINOPHIL # BLD AUTO: 0.2 10E9/L (ref 0–0.7)
EOSINOPHIL NFR BLD AUTO: 2 %
ERYTHROCYTE [DISTWIDTH] IN BLOOD BY AUTOMATED COUNT: 14 % (ref 10–15)
GFR SERPL CREATININE-BSD FRML MDRD: >90 ML/MIN/{1.73_M2}
GLUCOSE SERPL-MCNC: 118 MG/DL (ref 70–99)
HCG SERPL QL: NEGATIVE
HCT VFR BLD AUTO: 34.6 % (ref 35–47)
HGB BLD-MCNC: 10.7 G/DL (ref 11.7–15.7)
IMM GRANULOCYTES # BLD: 0 10E9/L (ref 0–0.4)
IMM GRANULOCYTES NFR BLD: 0.2 %
LABORATORY COMMENT REPORT: NORMAL
LYMPHOCYTES # BLD AUTO: 1.8 10E9/L (ref 0.8–5.3)
LYMPHOCYTES NFR BLD AUTO: 17.4 %
MCH RBC QN AUTO: 27 PG (ref 26.5–33)
MCHC RBC AUTO-ENTMCNC: 30.9 G/DL (ref 31.5–36.5)
MCV RBC AUTO: 87 FL (ref 78–100)
MONOCYTES # BLD AUTO: 0.7 10E9/L (ref 0–1.3)
MONOCYTES NFR BLD AUTO: 7 %
NEUTROPHILS # BLD AUTO: 7.7 10E9/L (ref 1.6–8.3)
NEUTROPHILS NFR BLD AUTO: 73 %
NRBC # BLD AUTO: 0 10*3/UL
NRBC BLD AUTO-RTO: 0 /100
PLATELET # BLD AUTO: 303 10E9/L (ref 150–450)
POTASSIUM SERPL-SCNC: 3.8 MMOL/L (ref 3.4–5.3)
RBC # BLD AUTO: 3.96 10E12/L (ref 3.8–5.2)
SALICYLATES SERPL-MCNC: <2 MG/DL
SARS-COV-2 RNA SPEC QL NAA+PROBE: NEGATIVE
SARS-COV-2 RNA SPEC QL NAA+PROBE: NORMAL
SODIUM SERPL-SCNC: 141 MMOL/L (ref 133–144)
SPECIMEN SOURCE: NORMAL
SPECIMEN SOURCE: NORMAL
WBC # BLD AUTO: 10.6 10E9/L (ref 4–11)

## 2020-11-27 PROCEDURE — 70360 X-RAY EXAM OF NECK: CPT | Mod: 26 | Performed by: RADIOLOGY

## 2020-11-27 PROCEDURE — 80329 ANALGESICS NON-OPIOID 1 OR 2: CPT | Performed by: EMERGENCY MEDICINE

## 2020-11-27 PROCEDURE — 74019 RADEX ABDOMEN 2 VIEWS: CPT

## 2020-11-27 PROCEDURE — 250N000011 HC RX IP 250 OP 636: Performed by: EMERGENCY MEDICINE

## 2020-11-27 PROCEDURE — 85025 COMPLETE CBC W/AUTO DIFF WBC: CPT | Performed by: EMERGENCY MEDICINE

## 2020-11-27 PROCEDURE — 71045 X-RAY EXAM CHEST 1 VIEW: CPT | Mod: 26 | Performed by: RADIOLOGY

## 2020-11-27 PROCEDURE — 80048 BASIC METABOLIC PNL TOTAL CA: CPT | Performed by: EMERGENCY MEDICINE

## 2020-11-27 PROCEDURE — 99285 EMERGENCY DEPT VISIT HI MDM: CPT | Mod: 25 | Performed by: EMERGENCY MEDICINE

## 2020-11-27 PROCEDURE — 99285 EMERGENCY DEPT VISIT HI MDM: CPT | Performed by: EMERGENCY MEDICINE

## 2020-11-27 PROCEDURE — 71045 X-RAY EXAM CHEST 1 VIEW: CPT

## 2020-11-27 PROCEDURE — 90791 PSYCH DIAGNOSTIC EVALUATION: CPT

## 2020-11-27 PROCEDURE — U0003 INFECTIOUS AGENT DETECTION BY NUCLEIC ACID (DNA OR RNA); SEVERE ACUTE RESPIRATORY SYNDROME CORONAVIRUS 2 (SARS-COV-2) (CORONAVIRUS DISEASE [COVID-19]), AMPLIFIED PROBE TECHNIQUE, MAKING USE OF HIGH THROUGHPUT TECHNOLOGIES AS DESCRIBED BY CMS-2020-01-R: HCPCS | Performed by: EMERGENCY MEDICINE

## 2020-11-27 PROCEDURE — 84703 CHORIONIC GONADOTROPIN ASSAY: CPT | Performed by: EMERGENCY MEDICINE

## 2020-11-27 PROCEDURE — 70360 X-RAY EXAM OF NECK: CPT

## 2020-11-27 PROCEDURE — 74019 RADEX ABDOMEN 2 VIEWS: CPT | Mod: 26 | Performed by: RADIOLOGY

## 2020-11-27 PROCEDURE — 96374 THER/PROPH/DIAG INJ IV PUSH: CPT | Performed by: EMERGENCY MEDICINE

## 2020-11-27 PROCEDURE — C9803 HOPD COVID-19 SPEC COLLECT: HCPCS | Performed by: EMERGENCY MEDICINE

## 2020-11-27 RX ORDER — KETOROLAC TROMETHAMINE 15 MG/ML
15 INJECTION, SOLUTION INTRAMUSCULAR; INTRAVENOUS ONCE
Status: COMPLETED | OUTPATIENT
Start: 2020-11-27 | End: 2020-11-27

## 2020-11-27 RX ADMIN — KETOROLAC TROMETHAMINE 15 MG: 15 INJECTION, SOLUTION INTRAMUSCULAR; INTRAVENOUS at 21:46

## 2020-11-27 ASSESSMENT — ENCOUNTER SYMPTOMS
SHORTNESS OF BREATH: 0
SORE THROAT: 1
COLOR CHANGE: 0
NECK STIFFNESS: 0
HEADACHES: 0
CONFUSION: 0
FEVER: 0
ARTHRALGIAS: 0
ABDOMINAL PAIN: 0
EYE REDNESS: 0
DIFFICULTY URINATING: 0

## 2020-11-28 ENCOUNTER — HOSPITAL ENCOUNTER (INPATIENT)
Facility: HOSPITAL | Age: 30
End: 2020-11-28
Attending: PSYCHIATRY & NEUROLOGY | Admitting: PSYCHIATRY & NEUROLOGY
Payer: COMMERCIAL

## 2020-11-28 ENCOUNTER — ANESTHESIA EVENT (OUTPATIENT)
Dept: SURGERY | Facility: CLINIC | Age: 30
DRG: 883 | End: 2020-11-28
Payer: COMMERCIAL

## 2020-11-28 ENCOUNTER — ANESTHESIA (OUTPATIENT)
Dept: SURGERY | Facility: CLINIC | Age: 30
DRG: 883 | End: 2020-11-28
Payer: COMMERCIAL

## 2020-11-28 ENCOUNTER — TELEPHONE (OUTPATIENT)
Dept: BEHAVIORAL HEALTH | Facility: CLINIC | Age: 30
End: 2020-11-28

## 2020-11-28 LAB
AMPHETAMINES UR QL SCN: NEGATIVE
BARBITURATES UR QL: NEGATIVE
BENZODIAZ UR QL: NEGATIVE
CANNABINOIDS UR QL SCN: NEGATIVE
COCAINE UR QL: NEGATIVE
ETHANOL UR QL SCN: NEGATIVE
GLUCOSE BLDC GLUCOMTR-MCNC: 125 MG/DL (ref 70–99)
OPIATES UR QL SCN: NEGATIVE

## 2020-11-28 PROCEDURE — 250N000011 HC RX IP 250 OP 636: Performed by: FAMILY MEDICINE

## 2020-11-28 PROCEDURE — 272N000001 HC OR GENERAL SUPPLY STERILE: Performed by: INTERNAL MEDICINE

## 2020-11-28 PROCEDURE — 258N000003 HC RX IP 258 OP 636: Performed by: ANESTHESIOLOGY

## 2020-11-28 PROCEDURE — 43247 EGD REMOVE FOREIGN BODY: CPT | Mod: GC | Performed by: INTERNAL MEDICINE

## 2020-11-28 PROCEDURE — 250N000013 HC RX MED GY IP 250 OP 250 PS 637: Performed by: ANESTHESIOLOGY

## 2020-11-28 PROCEDURE — 370N000001 HC ANESTHESIA TECHNICAL FEE, 1ST 30 MIN: Performed by: INTERNAL MEDICINE

## 2020-11-28 PROCEDURE — 250N000011 HC RX IP 250 OP 636: Performed by: EMERGENCY MEDICINE

## 2020-11-28 PROCEDURE — 94660 CPAP INITIATION&MGMT: CPT

## 2020-11-28 PROCEDURE — 250N000013 HC RX MED GY IP 250 OP 250 PS 637: Performed by: FAMILY MEDICINE

## 2020-11-28 PROCEDURE — 999N001017 HC STATISTIC GLUCOSE BY METER IP

## 2020-11-28 PROCEDURE — 999N000139 HC STATISTIC PRE-PROCEDURE ASSESSMENT II: Performed by: INTERNAL MEDICINE

## 2020-11-28 PROCEDURE — 360N000017 HC SURGERY LEVEL 2 EA 15 ADDTL MIN - UMMC: Performed by: INTERNAL MEDICINE

## 2020-11-28 PROCEDURE — 0DC68ZZ EXTIRPATION OF MATTER FROM STOMACH, VIA NATURAL OR ARTIFICIAL OPENING ENDOSCOPIC: ICD-10-PCS | Performed by: INTERNAL MEDICINE

## 2020-11-28 PROCEDURE — 96376 TX/PRO/DX INJ SAME DRUG ADON: CPT | Performed by: EMERGENCY MEDICINE

## 2020-11-28 PROCEDURE — 250N000009 HC RX 250: Performed by: NURSE ANESTHETIST, CERTIFIED REGISTERED

## 2020-11-28 PROCEDURE — 80307 DRUG TEST PRSMV CHEM ANLYZR: CPT | Performed by: EMERGENCY MEDICINE

## 2020-11-28 PROCEDURE — 80320 DRUG SCREEN QUANTALCOHOLS: CPT | Performed by: EMERGENCY MEDICINE

## 2020-11-28 PROCEDURE — 99222 1ST HOSP IP/OBS MODERATE 55: CPT | Mod: 25 | Performed by: INTERNAL MEDICINE

## 2020-11-28 PROCEDURE — 360N000016 HC SURGERY LEVEL 2 1ST 30 MIN - UMMC: Performed by: INTERNAL MEDICINE

## 2020-11-28 PROCEDURE — 96375 TX/PRO/DX INJ NEW DRUG ADDON: CPT | Performed by: EMERGENCY MEDICINE

## 2020-11-28 PROCEDURE — 250N000003 HC SEVOFLURANE, EA 15 MIN: Performed by: INTERNAL MEDICINE

## 2020-11-28 PROCEDURE — 250N000011 HC RX IP 250 OP 636: Performed by: NURSE ANESTHETIST, CERTIFIED REGISTERED

## 2020-11-28 PROCEDURE — 761N000005 HC RECOVERY PHASE 1 LEVEL 3 FIRST HR: Performed by: INTERNAL MEDICINE

## 2020-11-28 PROCEDURE — 370N000002 HC ANESTHESIA TECHNICAL FEE, EACH ADDTL 15 MIN: Performed by: INTERNAL MEDICINE

## 2020-11-28 RX ORDER — KETOROLAC TROMETHAMINE 15 MG/ML
15 INJECTION, SOLUTION INTRAMUSCULAR; INTRAVENOUS ONCE
Status: COMPLETED | OUTPATIENT
Start: 2020-11-28 | End: 2020-11-28

## 2020-11-28 RX ORDER — ACETAMINOPHEN 500 MG
1000 TABLET ORAL ONCE
Status: COMPLETED | OUTPATIENT
Start: 2020-11-28 | End: 2020-11-28

## 2020-11-28 RX ORDER — ONDANSETRON 4 MG/1
4 TABLET, ORALLY DISINTEGRATING ORAL EVERY 30 MIN PRN
Status: DISCONTINUED | OUTPATIENT
Start: 2020-11-28 | End: 2020-11-28 | Stop reason: HOSPADM

## 2020-11-28 RX ORDER — ONDANSETRON 8 MG/1
8 TABLET, ORALLY DISINTEGRATING ORAL ONCE
Status: COMPLETED | OUTPATIENT
Start: 2020-11-28 | End: 2020-11-28

## 2020-11-28 RX ORDER — VITAMIN B COMPLEX
2000 TABLET ORAL DAILY
Status: DISCONTINUED | OUTPATIENT
Start: 2020-11-28 | End: 2020-11-29

## 2020-11-28 RX ORDER — PROPOFOL 10 MG/ML
INJECTION, EMULSION INTRAVENOUS PRN
Status: DISCONTINUED | OUTPATIENT
Start: 2020-11-28 | End: 2020-11-28

## 2020-11-28 RX ORDER — HEPARIN SODIUM (PORCINE) LOCK FLUSH IV SOLN 100 UNIT/ML 100 UNIT/ML
5 SOLUTION INTRAVENOUS
Status: DISCONTINUED | OUTPATIENT
Start: 2020-11-28 | End: 2020-11-29

## 2020-11-28 RX ORDER — DESVENLAFAXINE 50 MG/1
100 TABLET, FILM COATED, EXTENDED RELEASE ORAL ONCE
Status: COMPLETED | OUTPATIENT
Start: 2020-11-28 | End: 2020-11-28

## 2020-11-28 RX ORDER — NALOXONE HYDROCHLORIDE 0.4 MG/ML
0.2 INJECTION, SOLUTION INTRAMUSCULAR; INTRAVENOUS; SUBCUTANEOUS
Status: DISCONTINUED | OUTPATIENT
Start: 2020-11-28 | End: 2020-11-29

## 2020-11-28 RX ORDER — ALBUTEROL SULFATE 90 UG/1
2 AEROSOL, METERED RESPIRATORY (INHALATION)
Status: DISCONTINUED | OUTPATIENT
Start: 2020-11-28 | End: 2020-11-29

## 2020-11-28 RX ORDER — NALOXONE HYDROCHLORIDE 0.4 MG/ML
0.4 INJECTION, SOLUTION INTRAMUSCULAR; INTRAVENOUS; SUBCUTANEOUS
Status: DISCONTINUED | OUTPATIENT
Start: 2020-11-28 | End: 2020-11-29

## 2020-11-28 RX ORDER — LIDOCAINE HYDROCHLORIDE 20 MG/ML
INJECTION, SOLUTION INFILTRATION; PERINEURAL PRN
Status: DISCONTINUED | OUTPATIENT
Start: 2020-11-28 | End: 2020-11-28

## 2020-11-28 RX ORDER — ACETAMINOPHEN AND CODEINE PHOSPHATE 120; 12 MG/5ML; MG/5ML
0.35 SOLUTION ORAL DAILY
COMMUNITY
End: 2020-11-29

## 2020-11-28 RX ORDER — SENNOSIDES 8.6 MG
1 TABLET ORAL 2 TIMES DAILY PRN
COMMUNITY
End: 2021-07-21

## 2020-11-28 RX ORDER — FENTANYL CITRATE 50 UG/ML
25 INJECTION, SOLUTION INTRAMUSCULAR; INTRAVENOUS ONCE
Status: COMPLETED | OUTPATIENT
Start: 2020-11-28 | End: 2020-11-28

## 2020-11-28 RX ORDER — SODIUM CHLORIDE, SODIUM LACTATE, POTASSIUM CHLORIDE, CALCIUM CHLORIDE 600; 310; 30; 20 MG/100ML; MG/100ML; MG/100ML; MG/100ML
INJECTION, SOLUTION INTRAVENOUS CONTINUOUS
Status: DISCONTINUED | OUTPATIENT
Start: 2020-11-28 | End: 2020-11-28 | Stop reason: HOSPADM

## 2020-11-28 RX ORDER — DEXAMETHASONE SODIUM PHOSPHATE 4 MG/ML
INJECTION, SOLUTION INTRA-ARTICULAR; INTRALESIONAL; INTRAMUSCULAR; INTRAVENOUS; SOFT TISSUE PRN
Status: DISCONTINUED | OUTPATIENT
Start: 2020-11-28 | End: 2020-11-28

## 2020-11-28 RX ORDER — ACETAMINOPHEN 500 MG
500-1000 TABLET ORAL EVERY 8 HOURS PRN
Status: ON HOLD | COMMUNITY
End: 2022-02-16

## 2020-11-28 RX ORDER — FAMOTIDINE 20 MG/1
20 TABLET, FILM COATED ORAL 2 TIMES DAILY
COMMUNITY
End: 2020-11-29

## 2020-11-28 RX ORDER — HALOPERIDOL 5 MG/ML
2 INJECTION INTRAMUSCULAR ONCE
Status: COMPLETED | OUTPATIENT
Start: 2020-11-28 | End: 2020-11-28

## 2020-11-28 RX ORDER — ACETAMINOPHEN 325 MG/1
650 TABLET ORAL ONCE
Status: COMPLETED | OUTPATIENT
Start: 2020-11-28 | End: 2020-11-28

## 2020-11-28 RX ORDER — HYDROXYZINE HYDROCHLORIDE 25 MG/1
25-50 TABLET, FILM COATED ORAL 2 TIMES DAILY PRN
COMMUNITY
End: 2020-11-29

## 2020-11-28 RX ORDER — TIZANIDINE 2 MG/1
2 TABLET ORAL 3 TIMES DAILY
COMMUNITY
End: 2020-11-28

## 2020-11-28 RX ORDER — SENNOSIDES 8.6 MG
8.6 TABLET ORAL 2 TIMES DAILY PRN
Status: DISCONTINUED | OUTPATIENT
Start: 2020-11-28 | End: 2020-11-29

## 2020-11-28 RX ORDER — PREGABALIN 50 MG/1
50 CAPSULE ORAL 3 TIMES DAILY
Status: DISCONTINUED | OUTPATIENT
Start: 2020-11-28 | End: 2020-11-29

## 2020-11-28 RX ORDER — HYDROXYCHLOROQUINE SULFATE 200 MG/1
200 TABLET, FILM COATED ORAL 2 TIMES DAILY
Status: DISCONTINUED | OUTPATIENT
Start: 2020-11-28 | End: 2020-11-29

## 2020-11-28 RX ORDER — ONDANSETRON 2 MG/ML
INJECTION INTRAMUSCULAR; INTRAVENOUS PRN
Status: DISCONTINUED | OUTPATIENT
Start: 2020-11-28 | End: 2020-11-28

## 2020-11-28 RX ORDER — LIDOCAINE 40 MG/G
CREAM TOPICAL
Status: DISCONTINUED | OUTPATIENT
Start: 2020-11-28 | End: 2020-11-28 | Stop reason: HOSPADM

## 2020-11-28 RX ORDER — ONDANSETRON 2 MG/ML
4 INJECTION INTRAMUSCULAR; INTRAVENOUS EVERY 30 MIN PRN
Status: DISCONTINUED | OUTPATIENT
Start: 2020-11-28 | End: 2020-11-28 | Stop reason: HOSPADM

## 2020-11-28 RX ORDER — METFORMIN HCL 500 MG
1000 TABLET, EXTENDED RELEASE 24 HR ORAL
Status: DISCONTINUED | OUTPATIENT
Start: 2020-11-28 | End: 2020-11-29

## 2020-11-28 RX ORDER — CLONIDINE HYDROCHLORIDE 0.1 MG/1
0.1 TABLET ORAL 2 TIMES DAILY
Status: DISCONTINUED | OUTPATIENT
Start: 2020-11-28 | End: 2020-11-29

## 2020-11-28 RX ADMIN — HEPARIN 5 ML: 100 SYRINGE at 20:24

## 2020-11-28 RX ADMIN — KETOROLAC TROMETHAMINE 15 MG: 15 INJECTION, SOLUTION INTRAMUSCULAR; INTRAVENOUS at 00:37

## 2020-11-28 RX ADMIN — Medication 2000 UNITS: at 14:21

## 2020-11-28 RX ADMIN — PREGABALIN 50 MG: 50 CAPSULE ORAL at 14:21

## 2020-11-28 RX ADMIN — ROCURONIUM BROMIDE 100 MG: 10 INJECTION INTRAVENOUS at 09:25

## 2020-11-28 RX ADMIN — DEXAMETHASONE SODIUM PHOSPHATE 6 MG: 4 INJECTION, SOLUTION INTRA-ARTICULAR; INTRALESIONAL; INTRAMUSCULAR; INTRAVENOUS; SOFT TISSUE at 09:30

## 2020-11-28 RX ADMIN — ALBUTEROL SULFATE 2 PUFF: 90 AEROSOL, METERED RESPIRATORY (INHALATION) at 14:45

## 2020-11-28 RX ADMIN — ONDANSETRON 8 MG: 8 TABLET, ORALLY DISINTEGRATING ORAL at 13:23

## 2020-11-28 RX ADMIN — DESVENLAFAXINE SUCCINATE 100 MG: 50 TABLET, EXTENDED RELEASE ORAL at 14:24

## 2020-11-28 RX ADMIN — METFORMIN HYDROCHLORIDE 1000 MG: 500 TABLET, EXTENDED RELEASE ORAL at 17:15

## 2020-11-28 RX ADMIN — LURASIDONE HYDROCHLORIDE 60 MG: 40 TABLET, FILM COATED ORAL at 19:47

## 2020-11-28 RX ADMIN — BUSPIRONE HYDROCHLORIDE 15 MG: 15 TABLET ORAL at 19:49

## 2020-11-28 RX ADMIN — SODIUM CHLORIDE, POTASSIUM CHLORIDE, SODIUM LACTATE AND CALCIUM CHLORIDE: 600; 310; 30; 20 INJECTION, SOLUTION INTRAVENOUS at 09:19

## 2020-11-28 RX ADMIN — LIDOCAINE HYDROCHLORIDE 100 MG: 20 INJECTION, SOLUTION INFILTRATION; PERINEURAL at 09:25

## 2020-11-28 RX ADMIN — ONDANSETRON 4 MG: 2 INJECTION INTRAMUSCULAR; INTRAVENOUS at 09:30

## 2020-11-28 RX ADMIN — CLONIDINE HYDROCHLORIDE 0.1 MG: 0.1 TABLET ORAL at 19:53

## 2020-11-28 RX ADMIN — SUGAMMADEX 400 MG: 100 INJECTION, SOLUTION INTRAVENOUS at 09:45

## 2020-11-28 RX ADMIN — FENTANYL CITRATE 25 MCG: 50 INJECTION, SOLUTION INTRAMUSCULAR; INTRAVENOUS at 02:27

## 2020-11-28 RX ADMIN — ACETAMINOPHEN 1000 MG: 500 TABLET, FILM COATED ORAL at 14:51

## 2020-11-28 RX ADMIN — PROPOFOL 200 MG: 10 INJECTION, EMULSION INTRAVENOUS at 09:25

## 2020-11-28 RX ADMIN — ACETAMINOPHEN 650 MG: 325 TABLET, FILM COATED ORAL at 10:39

## 2020-11-28 RX ADMIN — HALOPERIDOL LACTATE 2 MG: 5 INJECTION, SOLUTION INTRAMUSCULAR at 04:25

## 2020-11-28 NOTE — ED NOTES
Bed: ED17  Expected date:   Expected time:   Means of arrival:   Comments:  Hold for east er patient

## 2020-11-28 NOTE — ED NOTES
[facility]    ED Nurse to Floor Handoff     Nevin Alvarado is a 30 year old female who speaks English and lives alone,  in a home  They arrived in the ED by car from emergency room    ED Chief Complaint: Swallowed Foreign Body    ED Dx;   Final diagnoses:   Suicide gesture, subsequent encounter (H)         Needed?: No    Allergies:   Allergies   Allergen Reactions     Amoxicillin-Pot Clavulanate Other (See Comments), Swelling and Rash     PN: facial swelling, left side. Also had cortisone injection the same day as she started the Augmentin.  Noted in downtime recovery of chart.    PN: facial swelling, left side. Also had cortisone injection the same day as she started the Augmentin.; HUT Comment: PN: facial swelling, left side. Also had cortisone injection the same day as she started the Augmentin.Noted in downtime recovery of chart.; HUT Reaction: Rash; HUT Reaction: Unknown; HUT Reaction Type: Allergy; HUT Severity: Med; HUT Noted: [id number]     Oseltamivir Hives     med stopped, PN: med stopped  med stopped, PN: med stopped; HUT Comment: med stopped, PN: med stopped; HUT Reaction: Hives; HUT Reaction Type: Allergy; HUT Severity: Med; HUT Noted: [id number]     Penicillins Anaphylaxis     HUT Reaction: Anaphylaxis; HUT Reaction Type: Allergy; HUT Severity: High; HUT Noted: [id number]     Vancomycin Itching, Swelling and Rash     Other reaction(s): Redness  Flushed face, very itchy; HUT Comment: Flushed face, very itchy; HUT Reaction: Angioedema; HUT Reaction: Redness; HUT Severity: Med; HUT Noted: [id number]    facial     Hydrocodone Nausea and Vomiting and GI Disturbance     vomiting for days, PN: vomiting for days; HUT Comment: vomiting for days; HUT Reaction: Gastrointestinal; HUT Reaction: Nausea And Vomiting; HUT Reaction Type: Intolerance; HUT Severity: Med; HUT Noted: [id number]  vomiting for days       Blood-Group Specific Substance Other (See Comments)      Patient has an anti-Cw and non-specific antibodies. Blood product orders may be delayed. Draw one red top and two purple top tubes for all type/screen/crossmatch orders.  Patient has anti-Cw, anti-K ([place]), Warm auto and nonspecific antibodies. Blood products may be delayed. Draw patient 24 hours prior to transfusion. Draw one red top and two purple top tubes for all type and screen orders.     Oxycodone Swelling     Cephalosporins Rash     Influenza Vaccines Other (See Comments) and Nausea and Vomiting     HUT Reaction: Nausea And Vomiting; HUT Reaction Type: Intolerance; HUT Severity: Low; HUT Noted: [id number]     Lamotrigine Rash     Possibly associated with Lamictal.   HUT Comment: Possibly associated with Lamictal. ; HUT Reaction: Rash; HUT Reaction Type: Allergy; HUT Severity: Low; HUT Noted: [id number]     Latex Rash     HUT Reaction: Rash; HUT Reaction Type: Allergy; HUT Severity: Low; HUT Noted: [id number]   .  Past Medical Hx:   Past Medical History:   Diagnosis Date     ADD (attention deficit disorder)      Anorexia nervosa with bulimia     history of; on Topamax     Anxiety      Borderline personality disorder (H)      Depression      H/O self-harm      History of pulmonary embolism 12/2019    Provoked. Completed 3 month course of Apixaban     Morbid obesity (H)      Neuropathy      PTSD (post-traumatic stress disorder)      Rectal foreign body - Recurrent issue, self placed      Suicide attempt (H) 2/21/2018     Swallowed foreign body - Recurrent issue, self placed       Baseline Mental status: WDL  Current Mental Status changes: at basesline    Infection present or suspected this encounter: no  Sepsis suspected: No  Isolation type: No active isolations  Patient tested for COVID 19 prior to admission: YES     Activity level - Baseline/Home:  Independent  Activity Level - Current:   Stand with Assist    Bariatric equipment needed?: No    In the ED these meds were given:   Medications   ketorolac (TORADOL)  injection 15 mg (15 mg Intravenous Given 11/27/20 2146)       Drips running?  No    Home pump  No    Current LDAs  Port A Cath Single 09/08/20 Right Chest wall (Active)   Access Date 11/27/20 11/27/20 2137   Access Attempts 1 11/27/20 2137   Gauge 20 gauge;3/4 inch 11/27/20 2137   Site Assessment WDL 11/27/20 2137   Line Status Saline locked 11/27/20 2137   Number of days: 80       Labs results:   Labs Ordered and Resulted from Time of ED Arrival Up to the Time of Departure from the ED   CBC WITH PLATELETS DIFFERENTIAL - Abnormal; Notable for the following components:       Result Value    Hemoglobin 10.7 (*)     Hematocrit 34.6 (*)     MCHC 30.9 (*)     All other components within normal limits   BASIC METABOLIC PANEL   HCG QUALITATIVE   ACETAMINOPHEN LEVEL   SALICYLATE LEVEL   COVID-19 VIRUS (CORONAVIRUS) BY PCR   DRUG ABUSE SCREEN 6 CHEM DEP URINE ([facility])   ASSESSMENT       Imaging Studies:   Recent Results (from the past 24 hour(s))   XR Chest Port 1 View    Narrative    EXAM: XR CHEST PORT 1 VW  11/27/2020 7:49 PM      HISTORY: swallowed fb    COMPARISON: Chest x-ray 10/11/2020    FINDINGS:     Portable upright AP radiograph of the chest. Right IJ Port-A-Cath with  tip at the cavoatrial junction. The costophrenic angles are collimated  out of the field of view, however there is no large pleural effusion.  No focal airspace opacity. No pneumothorax. No radiopaque foreign  bodies. Convex right  curvature of the thoracic spine, centered at  approximately T5.       Impression    IMPRESSION:  1. No radiopaque foreign body.  2. No focal airspace disease.    I have personally reviewed the examination and initial interpretation  and I agree with the findings.    LEXII CHAVIRA MD   XR Abdomen 2 Views    Narrative    EXAM: XR ABDOMEN 2 VW  11/27/2020 8:44 PM     HISTORY:  ap, lateral, r/o fb       COMPARISON:  11/17/2020    FINDINGS:   Upright frontal and lateral and supine frontal radiograph the abdomen.  There is  a 8.5 mm linear metallic density which overlies the mid  abdomen, likely within the stomach. Cholecystectomy clips in the right  upper quadrant. No free intraperitoneal air. Moderate colonic stool  burden. Nonobstructive bowel gas pattern. Visualized lung bases are  clear. Levocurvature of the lower thoracic spine.      Impression    IMPRESSION:   1. 8.5 mm linear metallic density projects over the mid abdomen,  likely within the stomach. No evidence of free intraperitoneal air.  2. Nonobstructive bowel gas pattern with a moderate colonic stool  burden.  3. Levocurvature of lower thoracic spine.    I have personally reviewed the examination and initial interpretation  and I agree with the findings.    LEXII CHAVIRA MD       Recent vital signs:   BP (!) 145/101   Pulse 106   Temp 99.2  F (37.3  C) (Oral)   SpO2 99%   Breastfeeding No     [place]ar Coma Scale Score: 15 (11/27/20 1947)       Cardiac Rhythm: Other  Pt needs tele? TBD  Skin/wound Issues: None    Code Status: Full Code    Pain control: fair    Nausea control: good    Abnormal labs/tests/findings requiring intervention: See epic    Family present during ED course? No   Family Comments/Social Situation comments: n/a    Tasks needing completion: None at this time    Margie Wallis, RN    [phone] [facility]

## 2020-11-28 NOTE — OP NOTE
Gastroenterology Endoscopy Suite Brief Operative Note    Procedure:  Upper endoscopy   Post-operative diagnosis:  FB removal   Staff Physician:  Dr. Rogers   Fellow/Assistant(s):  Tha   Specimens:  Please see final procedure note for further details.   Findings:  8cm paper clip removed, minimal erosions from clip noticed in the gastric body   Complications:  None.   Condition:  Stable   Recommendations  Diet:  Clear liquid diet, advance as tolerates  PPI:  N/A  Anti-coagulants/platelets:  N/A  Octreotide:  N/A  Discharge Planning:    as per inpatient team

## 2020-11-28 NOTE — PROGRESS NOTES
Called by ED regarding pt who is known to GI service for a foriegn body ingestion. AXR was reviewed and shows a FB in the gastric body approx 10cm. Pt is hemodynamically stable. Her COVID test is pending. Her case was discussed in detail and decision was made to perform endoscopy non-emergently to minimize utilization of PPE and OR resources.    -- Please keep pt NPO over night, plan for EGD at 9am tomorrow  -- Please get lab work and maintain electrolytes WNL  -- Avoid pain meds  -- Monitor closely and have 1:1 sitter  -- If becomes unstable, recommend to get CT abd overnight    Plan was discussed with Dr. Rogers.    Chetan Almazan  GI fellow

## 2020-11-28 NOTE — ED TRIAGE NOTES
Pt BIBA, swallowed a paperclip, states it may be stuck in the back of the throat. Swallowed about 45-50 minutes ago. Pt states it is a coping mechanism and was not an intent of suicide. Can feel it on the back of throat on left side when swallowing.

## 2020-11-28 NOTE — ED NOTES
Sign out Provider: Melyssa      Sign out Plan: Patient with extensive psychiatric history was seen at campus due to self injury and ingestion of a paperclip.  She had an endoscopy performed in the offending clip was apparently removed.  She is transferred here to be held pending psychiatric admission.  She is on a 72-hour hold.  She is awaiting bed placement.      Reassessment: Patient is depressed, anxious and tearful.  She requests her regular daily medications and something for nausea.      Disposition: Continue to await bed placement.       Barney Garcia MD  11/28/20 8649

## 2020-11-28 NOTE — TELEPHONE ENCOUNTER
Patient cleared and ready for behavioral bed placement: No     S: 29 y/o female in the [facility] ED presenting after swallowing a foreign body.    B: Hx bipolar d/o, ADD, PTSD, SIB, pica.  Pt reported she swallowed a straightened paperclip prior to being brought in to the ED but denied the intent was to self-harm.  Per HPI, pt was also seen on 11/17/20 with a similar presentation.  3rd foreign body ingestion since last [facility] admission (20 in September) and 12 ED presentations for this issue this year.  Per DEC Assessment, [place] [facility] [place] after she swallowed 36 diphenhydramine and 2 mickie pins.  OP resources include a , ILS worker, CADI , therapist, psychiatrist and DBT group.  No reported HI or psychosis.    A: 72 hr hold.  Medical clearance pending.  Pending endoscopy to remove foreign body.  Estimated to be medically cleared after 9 AM  See Emergency Care Plan    R: Awaiting medical clearance.

## 2020-11-28 NOTE — CONSULTS
GASTROENTEROLOGY CONSULTATION    Date: 11/28/2020  Date of Admission: 11/27/2020  Reason for Consultation: FB ingestion  Requested by:   Dr. Lovell  Brief Summary:   We were asked by Dr. Lovell to evaluate this patient with foreign body ingestion which was straightened paper clip      ASSESSMENT AND RECOMMENDATIONS:   Assessment:  30 year old female with a history of anxiety and  frequent foreign body ingestion, including 2 perforations, multiple prior endoscopic procedures, who came in after swallowing a straightened paperclip yesterday.       Recommendations  --Continue one-to-one sitter  --Maintain NPO  --Plan to perform endoscopy at 9 AM today  --Consult psych for management of anxiety and borderline personality disorder      Gastroenterology outpatient follow up recommendations: pending clinical course    Thank you for involving us in this patient's care. Please do not hesitate to contact the GI service with any questions or concerns.     Pt care plan discussed with Dr. Rogers, GI staff physician.    This note was created with voice recognition software, and while reviewed for accuracy, typos may remain.    Chetan Almazan MD  GI Fellow  Pager: [phone]  -------------------------------------------------------------------------------------------------------------------           History of Present Illness:   Nevin Alvarado is a 30 year old female with a history of anxiety and  frequent foreign body ingestion, including 2 perforations, multiple prior endoscopic procedures, who came in after swallowing a straightened paperclip yesterday.    Patient stated that she had her dinner last night around 5:30 followed by anxiety attack after which she ingested a straightened paperclip.  She presented to the ED around 6:30.  Complains of mild abdominal pain in the epigastric area.            Past Medical History:   Reviewed and edited as appropriate  Past Medical History:   Diagnosis Date     ADD (attention deficit  disorder)      Anorexia nervosa with bulimia     history of; on Topamax     Anxiety      Borderline personality disorder (H)      Depression      H/O self-harm      History of pulmonary embolism 12/2019    Provoked. Completed 3 month course of Apixaban     Morbid obesity (H)      Neuropathy      PTSD (post-traumatic stress disorder)      Rectal foreign body - Recurrent issue, self placed      Suicide attempt (H) 2/21/2018     Swallowed foreign body - Recurrent issue, self placed             Past Surgical History:   Reviewed and edited as appropriate   Past Surgical History:   Procedure Laterality Date     COMBINED ESOPHAGOSCOPY, GASTROSCOPY, DUODENOSCOPY (EGD), REPLACE ESOPHAGEAL STENT N/A 10/9/2019    Procedure: Upper Endoscopy with Suture Placement;  Surgeon: Zurdo Ramirez MD;  Location: [facility]     ESOPHAGOSCOPY, GASTROSCOPY, DUODENOSCOPY (EGD), COMBINED N/A 3/9/2017    Procedure: COMBINED ESOPHAGOSCOPY, GASTROSCOPY, DUODENOSCOPY (EGD), REMOVE FOREIGN BODY;  Surgeon: Avis Guzmán MD;  Location: [facility]     ESOPHAGOSCOPY, GASTROSCOPY, DUODENOSCOPY (EGD), COMBINED N/A 4/20/2017    Procedure: COMBINED ESOPHAGOSCOPY, GASTROSCOPY, DUODENOSCOPY (EGD), REMOVE FOREIGN BODY;  EGD removal Foregin body;  Surgeon: Lokesh Paula MD;  Location: [facility]     ESOPHAGOSCOPY, GASTROSCOPY, DUODENOSCOPY (EGD), COMBINED N/A 6/12/2017    Procedure: COMBINED ESOPHAGOSCOPY, GASTROSCOPY, DUODENOSCOPY (EGD);  COMBINED ESOPHAGOSCOPY, GASTROSCOPY, DUODENOSCOPY (EGD) [[id number]]attempted removal of foreign body;  Surgeon: Pamela Perez MD;  [facility]: [facility]     ESOPHAGOSCOPY, GASTROSCOPY, DUODENOSCOPY (EGD), COMBINED N/A 6/9/2017    Procedure: COMBINED ESOPHAGOSCOPY, GASTROSCOPY, DUODENOSCOPY (EGD), REMOVE FOREIGN BODY;  Esophagoscopy, Gastroscopy, Duodenoscopy, Removal of Foreign Body;  Surgeon: Dejon Marsh MD;  Location: [facility]     ESOPHAGOSCOPY, GASTROSCOPY, DUODENOSCOPY (EGD), COMBINED N/A  1/6/2018    Procedure: COMBINED ESOPHAGOSCOPY, GASTROSCOPY, DUODENOSCOPY (EGD), REMOVE FOREIGN BODY;  COMBINED ESOPHAGOSCOPY, GASTROSCOPY, DUODENOSCOPY (EGD) [by pascal net and snare with profol sedation;  Surgeon: Feliciano Emmanuel MD;  Location:  GI     ESOPHAGOSCOPY, GASTROSCOPY, DUODENOSCOPY (EGD), COMBINED N/A 3/19/2018    Procedure: COMBINED ESOPHAGOSCOPY, GASTROSCOPY, DUODENOSCOPY (EGD), REMOVE FOREIGN BODY;   Esophagodscopy, Gastroscopy, Duodenoscopy,Foreign Body Removal;  Surgeon: Brice Guzmán MD;  [facility]     ESOPHAGOSCOPY, GASTROSCOPY, DUODENOSCOPY (EGD), COMBINED N/A 4/16/2018    Procedure: COMBINED ESOPHAGOSCOPY, GASTROSCOPY, DUODENOSCOPY (EGD), REMOVE FOREIGN BODY;  Esophagogastroduodenoscopy  Foreign Body Removal X 2;  Surgeon: Royer Moise MD;  [facility]     ESOPHAGOSCOPY, GASTROSCOPY, DUODENOSCOPY (EGD), COMBINED N/A 6/1/2018    Procedure: COMBINED ESOPHAGOSCOPY, GASTROSCOPY, DUODENOSCOPY (EGD), REMOVE FOREIGN BODY;  COMBINED ESOPHAGOSCOPY, GASTROSCOPY, DUODENOSCOPY with removal of foreign body, propofol sedation by anesthesia;  Surgeon: Brice Martinez MD;  Location:  GI     ESOPHAGOSCOPY, GASTROSCOPY, DUODENOSCOPY (EGD), COMBINED N/A 7/25/2018    Procedure: COMBINED ESOPHAGOSCOPY, GASTROSCOPY, DUODENOSCOPY (EGD), REMOVE FOREIGN BODY;;  Surgeon: Candy Castelan MD;  Location:  GI     ESOPHAGOSCOPY, GASTROSCOPY, DUODENOSCOPY (EGD), COMBINED N/A 7/28/2018    Procedure: COMBINED ESOPHAGOSCOPY, GASTROSCOPY, DUODENOSCOPY (EGD), REMOVE FOREIGN BODY;  COMBINED ESOPHAGOSCOPY, GASTROSCOPY, DUODENOSCOPY (EGD), REMOVE FOREIGN BODY;  Surgeon: Brice Guzmán MD;  [facility]     ESOPHAGOSCOPY, GASTROSCOPY, DUODENOSCOPY (EGD), COMBINED N/A 7/31/2018    Procedure: COMBINED ESOPHAGOSCOPY, GASTROSCOPY, DUODENOSCOPY (EGD);  COMBINED ESOPHAGOSCOPY, GASTROSCOPY, DUODENOSCOPY (EGD) TO REMOVE FOREIGN BODY;  Surgeon: Lokesh Paula MD;  Location:   OR     ESOPHAGOSCOPY, GASTROSCOPY, DUODENOSCOPY (EGD), COMBINED N/A 8/4/2018    Procedure: COMBINED ESOPHAGOSCOPY, GASTROSCOPY, DUODENOSCOPY (EGD), REMOVE FOREIGN BODY;   combined esophagoscopy, gastroscopy, duodenoscopy, REMOVE FOREIGN BODY ;  Surgeon: Lokesh Paula MD;  [facility]     ESOPHAGOSCOPY, GASTROSCOPY, DUODENOSCOPY (EGD), COMBINED N/A 10/6/2019    Procedure: ESOPHAGOGASTRODUODENOSCOPY (EGD) with fireign body removal x2, esophageal stent placement with floroscopy;  Surgeon: Timoteo Espana MD;  [facility]     ESOPHAGOSCOPY, GASTROSCOPY, DUODENOSCOPY (EGD), COMBINED N/A 12/2/2019    Procedure: Esophagogastroduodenoscopy with esophageal stent removal, esophogram;  Surgeon: Kailee Tena MD;  [facility]     ESOPHAGOSCOPY, GASTROSCOPY, DUODENOSCOPY (EGD), COMBINED N/A 12/17/2019    Procedure: ESOPHAGOGASTRODUODENOSCOPY, WITH FOREIGN BODY REMOVAL;  Surgeon: Pamela Perez MD;  [facility]     ESOPHAGOSCOPY, GASTROSCOPY, DUODENOSCOPY (EGD), COMBINED N/A 12/13/2019    Procedure: ESOPHAGOGASTRODUODENOSCOPY, WITH FOREIGN BODY REMOVAL;  Surgeon: Samia Stanton MD;  [facility]     ESOPHAGOSCOPY, GASTROSCOPY, DUODENOSCOPY (EGD), COMBINED N/A 12/28/2019    Procedure: ESOPHAGOGASTRODUODENOSCOPY (EGD) Removal of Foreign Body X 2;  Surgeon: Huy Kelley MD;  [facility]     ESOPHAGOSCOPY, GASTROSCOPY, DUODENOSCOPY (EGD), COMBINED N/A 1/5/2020    Procedure: ESOPHAGOGASTRODUOENOSCOPY WITH FOREIGN BODY REMOVAL;  Surgeon: Pamela Perez MD;  [facility]     ESOPHAGOSCOPY, GASTROSCOPY, DUODENOSCOPY (EGD), COMBINED N/A 1/3/2020    Procedure: ESOPHAGOGASTRODUODENOSCOPY (EGD) REMOVAL OF FOREIGN BODY.;  Surgeon: Pamela Perez MD;  [facility]     ESOPHAGOSCOPY, GASTROSCOPY, DUODENOSCOPY (EGD), COMBINED N/A 1/13/2020    Procedure: ESOPHAGOGASTRODUODENOSCOPY (EGD) for foreign body removal;  Surgeon: Lokesh Paula,  MD;  [facility]     ESOPHAGOSCOPY, GASTROSCOPY, DUODENOSCOPY (EGD), COMBINED N/A 1/18/2020    Procedure: Diagnostic ESOPHAGOGASTRODUODENOSCOPY (EGD;  Surgeon: Lokesh Paula MD;  [facility]     ESOPHAGOSCOPY, GASTROSCOPY, DUODENOSCOPY (EGD), COMBINED N/A 3/29/2020    Procedure: UPPER ENDOSCOPY WITH FOREIGN BODY REMOVAL;  Surgeon: Doug Hansen MD;  [facility]     ESOPHAGOSCOPY, GASTROSCOPY, DUODENOSCOPY (EGD), COMBINED N/A 7/11/2020    Procedure: ESOPHAGOGASTRODUODENOSCOPY (EGD); Upper Endoscopy WITH FOREIGN BODY REMOVAL;  Surgeon: Lyndsey Mendoza DO;  [facility]     ESOPHAGOSCOPY, GASTROSCOPY, DUODENOSCOPY (EGD), COMBINED N/A 7/29/2020    Procedure: ESOPHAGOGASTRODUODENOSCOPY REMOVAL OF FOREIGN BODY;  Surgeon: Samia Stanton MD;  [facility]     ESOPHAGOSCOPY, GASTROSCOPY, DUODENOSCOPY (EGD), COMBINED N/A 8/1/2020    Procedure: ESOPHAGOGASTRODUODENOSCOPY, WITH FOREIGN BODY REMOVAL;  Surgeon: Pamela Perez MD;  [facility]     ESOPHAGOSCOPY, GASTROSCOPY, DUODENOSCOPY (EGD), COMBINED N/A 8/18/2020    Procedure: ESOPHAGOGASTRODUODENOSCOPY (EGD) for foreign body removal;  Surgeon: Pamela Perez MD;  [facility]     ESOPHAGOSCOPY, GASTROSCOPY, DUODENOSCOPY (EGD), COMBINED N/A 8/27/2020    Procedure: ESOPHAGOGASTRODUODENOSCOPY (EGD) with foreign body removal;  Surgeon: Campbell Rogers MD;  [facility]     ESOPHAGOSCOPY, GASTROSCOPY, DUODENOSCOPY (EGD), COMBINED N/A 9/18/2020    Procedure: ESOPHAGOGASTRODUODENOSCOPY (EGD) with foreign body removal;  Surgeon: Dick Gillis MD;  [facility]     ESOPHAGOSCOPY, GASTROSCOPY, DUODENOSCOPY (EGD), COMBINED N/A 11/18/2020    Procedure: ESOPHAGOGASTRODUODENOSCOPY, WITH FOREIGN BODY REMOVAL;  Surgeon: Felipe Ulloa DO;  [facility]     EXAM UNDER ANESTHESIA ANUS N/A 1/10/2017    Procedure: EXAM UNDER ANESTHESIA ANUS;  Surgeon: Annmarie Haynes MD;  [facility]      EXAM UNDER ANESTHESIA RECTUM N/A 7/19/2018    Procedure: EXAM UNDER ANESTHESIA RECTUM;  EXAM UNDER ANESTHESIA, REMOVAL OF RECTAL FOREIGN BODY;  Surgeon: Annmraie Haynes MD;  [facility]     HC REMOVE FECAL IMPACTION OR FB W ANESTHESIA N/A 12/18/2016    Procedure: REMOVE FECAL IMPACTION/FOREIGN BODY UNDER ANESTHESIA;  Surgeon: Soham Cano MD;  Location: [facility]     KNEE SURGERY - removed a small tissue mass from knee Right      LAPAROSCOPIC ABLATION ENDOMETRIOSIS       LAPAROTOMY EXPLORATORY N/A 1/10/2017    Procedure: LAPAROTOMY EXPLORATORY;  Surgeon: Annmarie Haynes MD;  [facility]     LAPAROTOMY EXPLORATORY  09/11/2019    Manual manipulation of bowel to remove pill bottle in rectum     lymph nodes removed from neck; benign  age 7     MAMMOPLASTY REDUCTION Bilateral      RELEASE CARPAL TUNNEL Bilateral      SIGMOIDOSCOPY FLEXIBLE N/A 1/10/2017    Procedure: SIGMOIDOSCOPY FLEXIBLE;  Surgeon: Annmarie Haynes MD;  [facility]     SIGMOIDOSCOPY FLEXIBLE N/A 5/8/2018    Procedure: SIGMOIDOSCOPY FLEXIBLE;  flex sig with foreign body removal using snare and rattooth forcep;  Surgeon: Soham Cano MD;  Location:  GI     SIGMOIDOSCOPY FLEXIBLE N/A 2/20/2019    Procedure: Exam under anesthesia Colonoscopy with attempted  removal of rectal foreign body;  Surgeon: Estrada Chávez MD;  Location[facility]            Previous Endoscopy:   No results found for this or any previous visit.         Social History:   Reviewed and edited as appropriate  Social History     Socioeconomic History     Marital status: Single     Spouse name: Not on file     Number of children: Not on file     Years of education: Not on file     Highest education level: Not on file   Occupational History     Occupation: On disability   Social Needs     Financial resource strain: Not on file     Food insecurity     Worry: Not on file     Inability: Not on file     Transportation needs     Medical: Not on  file     Non-medical: Not on file   Tobacco Use     Smoking status: Never Smoker     Smokeless tobacco: Never Used   Substance and Sexual Activity     Alcohol use: No     Alcohol/week: 0.0 standard drinks     Drug use: No     Sexual activity: Not Currently     Partners: Male     Birth control/protection: I.U.D.     Comment: IUD - Mirena - placed July, 2015   Lifestyle     Physical activity     Days per week: Not on file     Minutes per session: Not on file     Stress: Not on file   Relationships     Social connections     Talks on phone: Not on file     Gets together: Not on file     Attends Rastafarian service: Not on file     Active member of club or organization: Not on file     Attends meetings of clubs or organizations: Not on file     Relationship status: Not on file     Intimate partner violence     Fear of current or ex partner: Not on file     Emotionally abused: Not on file     Physically abused: Not on file     Forced sexual activity: Not on file   Other Topics Concern     Parent/sibling w/ CABG, MI or angioplasty before 65F 55M? Not Asked   Social History Narrative    Single.    Living in independent living portion of [employer] [facility].    On disability.    No regular exercise.             Family History:   Reviewed and edited as appropriate  Family History   Problem Relation Age of Onset     Diabetes Type 2  Maternal Grandmother      Diabetes Type 2  Paternal Grandmother      Breast Cancer Paternal Grandmother      Cerebrovascular Disease Father 53     Myocardial Infarction No family hx of      Coronary Artery Disease Early Onset No family hx of      Ovarian Cancer No family hx of      Colon Cancer No family hx of             Allergies:   Reviewed and edited as appropriate     Allergies   Allergen Reactions     Amoxicillin-Pot Clavulanate Other (See Comments), Swelling and Rash     PN: facial swelling, left side. Also had cortisone injection the same day as she started the Augmentin.  Noted in downtime  recovery of chart.    PN: facial swelling, left side. Also had cortisone injection the same day as she started the Augmentin.; HUT Comment: PN: facial swelling, left side. Also had cortisone injection the same day as she started the Augmentin.Noted in downtime recovery of chart.; HUT Reaction: Rash; HUT Reaction: Unknown; HUT Reaction Type: Allergy; HUT Severity: Med; HUT Noted: [id number]     Oseltamivir Hives     med stopped, PN: med stopped  med stopped, PN: med stopped; HUT Comment: med stopped, PN: med stopped; HUT Reaction: Hives; HUT Reaction Type: Allergy; HUT Severity: Med; HUT Noted: [id number]     Penicillins Anaphylaxis     HUT Reaction: Anaphylaxis; HUT Reaction Type: Allergy; HUT Severity: High; HUT Noted: [id number]     Vancomycin Itching, Swelling and Rash     Other reaction(s): Redness  Flushed face, very itchy; HUT Comment: Flushed face, very itchy; HUT Reaction: Angioedema; HUT Reaction: Redness; HUT Severity: Med; HUT Noted: [id number]    facial     Hydrocodone Nausea and Vomiting and GI Disturbance     vomiting for days, PN: vomiting for days; HUT Comment: vomiting for days; HUT Reaction: Gastrointestinal; HUT Reaction: Nausea And Vomiting; HUT Reaction Type: Intolerance; HUT Severity: Med; HUT Noted: [id number]  vomiting for days       Blood-Group Specific Substance Other (See Comments)     Patient has an anti-Cw and non-specific antibodies. Blood product orders may be delayed. Draw one red top and two purple top tubes for all type/screen/crossmatch orders.  Patient has anti-Cw, anti-K (Angella), Warm auto and nonspecific antibodies. Blood products may be delayed. Draw patient 24 hours prior to transfusion. Draw one red top and two purple top tubes for all type and screen orders.     Oxycodone Swelling     Cephalosporins Rash     Influenza Vaccines Other (See Comments) and Nausea and Vomiting     HUT Reaction: Nausea And Vomiting; HUT Reaction Type: Intolerance; HUT Severity: Low; HUT Noted: [id number]      Lamotrigine Rash     Possibly associated with Lamictal.   HUT Comment: Possibly associated with Lamictal. ; HUT Reaction: Rash; HUT Reaction Type: Allergy; HUT Severity: Low; HUT Noted: [id number]     Latex Rash     HUT Reaction: Rash; HUT Reaction Type: Allergy; HUT Severity: Low; HUT Noted: [id number]            Medications:     No current facility-administered medications for this encounter.      Current Outpatient Medications   Medication Sig     albuterol (PROAIR HFA/PROVENTIL HFA/VENTOLIN HFA) 108 (90 Base) MCG/ACT inhaler Inhale 2 puffs into the lungs as needed for shortness of breath / dyspnea or wheezing     busPIRone (BUSPAR) 15 MG tablet Take 1 tablet (15 mg) by mouth 2 times daily     Cholecalciferol (VITAMIN D) 50 MCG (2000 UT) CAPS Take 2,000 Units by mouth daily      cloNIDine (CATAPRES) 0.1 MG tablet Take 0.1 mg by mouth 2 times daily      desvenlafaxine (PRISTIQ) 100 MG 24 hr tablet Take 1 tablet (100 mg) by mouth every morning     docosanol (ABREVA) 10 % CREA cream Apply to lip 5 times a day as soon as symptoms begin, do not use for more than 10 days. Used PRN.     hydroxychloroquine (PLAQUENIL) 200 MG tablet Take 200 mg by mouth 2 times daily     lurasidone (LATUDA) 60 MG TABS tablet Take 60 mg by mouth daily (with dinner)      metFORMIN (GLUCOPHAGE-XR) 500 MG 24 hr tablet Take 1,000 mg by mouth daily (with dinner) Take 1 tablet (500 mg) by mouth daily      pregabalin (LYRICA) 50 MG capsule Take 50 mg by mouth 3 times daily     Prenatal Vit-Fe Fumarate-FA (PNV PRENATAL PLUS MULTIVITAMIN) 27-1 MG TABS per tablet Take 1 tablet by mouth daily     valACYclovir (VALTREX) 1000 mg tablet Take 1,000 mg by mouth Take 2 tablets by mouth two times daily for one day. Use as needed at onset of cold sore.             Review of Systems:     A complete 10 point review of systems was performed and is negative except as noted in the HPI           Physical Exam:   /79   Pulse 106   Temp 99.2  F (37.3  C) (Oral)    SpO2 100%   Breastfeeding No   Wt:   Wt Readings from Last 2 Encounters:   10/30/20 121.6 kg (268 lb)   09/28/20 121.1 kg (267 lb)      Constitutional: cooperative, pleasant  Eyes: Sclera anicteric  Ears/nose/mouth/throat: mucus membranes moist  Neck: supple  CV: No edema  Respiratory: Unlabored breathing  Abd: Nondistended, +bs, mild epigastric tender, no peritoneal signs  Skin: warm, perfused, no jaundice  Neuro: AAO x 3           Data:   Labs and imaging below were independently reviewed and interpreted    BMP  Recent Labs   Lab 11/27/20  2135      POTASSIUM 3.8   CHLORIDE 108   KELBY 8.9   CO2 26   BUN 9   CR 0.66   *     CBC  Recent Labs   Lab 11/27/20  2135   WBC 10.6   RBC 3.96   HGB 10.7*   HCT 34.6*   MCV 87   MCH 27.0   MCHC 30.9*   RDW 14.0        INRNo lab results found in last 7 days.  LFTsNo lab results found in last 7 days.   PANCNo lab results found in last 7 days.    Imaging:  AXR:  IMPRESSION:   1. 8.5 mm linear metallic density projects over the mid abdomen,  likely within the stomach. No evidence of free intraperitoneal air.  2. Nonobstructive bowel gas pattern with a moderate colonic stool  burden.  3. Levocurvature of lower thoracic spine.

## 2020-11-28 NOTE — ANESTHESIA PREPROCEDURE EVALUATION
Anesthesia Pre-Procedure Evaluation    Patient: Nevin Alvarado   MRN:     [id number] Gender:   female   Age:    30 year old :      1990        Preoperative Diagnosis: * No pre-op diagnosis entered *   Procedure(s):  ESOPHAGOGASTRODUODENOSCOPY (EGD)     LABS:  CBC:   Lab Results   Component Value Date    WBC 10.6 2020    WBC 8.5 2020    HGB 10.7 (L) 2020    HGB 10.6 (L) 2020    HCT 34.6 (L) 2020    HCT 33.3 (L) 2020     2020     2020     BMP:   Lab Results   Component Value Date     2020     2020    POTASSIUM 3.8 2020    POTASSIUM 3.4 2020    CHLORIDE 108 2020    CHLORIDE 106 2020    CO2 26 2020    CO2 27 2020    BUN 9 2020    BUN 13 2020    CR 0.66 2020    CR 0.62 2020     (H) 2020     (H) 2020     COAGS:   Lab Results   Component Value Date    PTT 34 2020    INR 0.97 2020     POC:   Lab Results   Component Value Date    BGM 95 2020    HCG Negative 10/31/2020    HCGS Negative 2020     OTHER:   Lab Results   Component Value Date    LACT 1.2 10/30/2020    KELBY 8.9 2020    PHOS 3.5 2019    MAG 2.0 10/31/2020    ALBUMIN 3.3 (L) 2020    PROTTOTAL 7.4 2020    ALT 32 2020    AST 24 2020    ALKPHOS 76 2020    BILITOTAL 0.2 2020    LIPASE 105 2020    TSH 6.96 (H) 2020    T4 0.94 2020    CRP 26.0 (H) 10/31/2020    SED 49 (H) 10/11/2020        Preop Vitals    BP Readings from Last 3 Encounters:   20 133/79   20 122/76   20 (!) 135/92    Pulse Readings from Last 3 Encounters:   20 106   20 74   20 90      Resp Readings from Last 3 Encounters:   20 16   20 16   10/31/20 16    SpO2 Readings from Last 3 Encounters:   20 100%   20 98%   20 94%      Temp Readings from Last 1 Encounters:  "  11/27/20 37.3  C (99.2  F) (Oral)    Ht Readings from Last 1 Encounters:   10/30/20 1.575 m (5' 2\")      Wt Readings from Last 1 Encounters:   10/30/20 121.6 kg (268 lb)    Estimated body mass index is 49.02 kg/m  as calculated from the following:    Height as of 10/30/20: 1.575 m (5' 2\").    Weight as of 10/30/20: 121.6 kg (268 lb).     LDA:  Port A Cath Single 09/08/20 Right Chest wall (Active)   Access Date 11/27/20 11/27/20 2137   Access Attempts 1 11/27/20 2137   Gauge 20 gauge;3/4 inch 11/27/20 2137   Site Assessment WDL 11/27/20 2137   Line Status Saline locked 11/27/20 2137   Number of days: 81        Past Medical History:   Diagnosis Date     ADD (attention deficit disorder)      Anorexia nervosa with bulimia     history of; on Topamax     Anxiety      Borderline personality disorder (H)      Depression      H/O self-harm      History of pulmonary embolism 12/2019    Provoked. Completed 3 month course of Apixaban     Morbid obesity (H)      Neuropathy      PTSD (post-traumatic stress disorder)      Rectal foreign body - Recurrent issue, self placed      Suicide attempt (H) 2/21/2018     Swallowed foreign body - Recurrent issue, self placed       Past Surgical History:   Procedure Laterality Date     COMBINED ESOPHAGOSCOPY, GASTROSCOPY, DUODENOSCOPY (EGD), REPLACE ESOPHAGEAL STENT N/A 10/9/2019    Procedure: Upper Endoscopy with Suture Placement;  Surgeon: Zurdo Ramirez MD;  [facility]: [facility]     ESOPHAGOSCOPY, GASTROSCOPY, DUODENOSCOPY (EGD), COMBINED N/A 3/9/2017    Procedure: COMBINED ESOPHAGOSCOPY, GASTROSCOPY, DUODENOSCOPY (EGD), REMOVE FOREIGN BODY;  Surgeon: Avis Guzmán MD;  Location: [facility]     ESOPHAGOSCOPY, GASTROSCOPY, DUODENOSCOPY (EGD), COMBINED N/A 4/20/2017    Procedure: COMBINED ESOPHAGOSCOPY, GASTROSCOPY, DUODENOSCOPY (EGD), REMOVE FOREIGN BODY;  EGD removal Foregin body;  Surgeon: Lokesh Paula MD;  Location: [facility]     ESOPHAGOSCOPY, GASTROSCOPY, DUODENOSCOPY " (EGD), COMBINED N/A 6/12/2017    Procedure: COMBINED ESOPHAGOSCOPY, GASTROSCOPY, DUODENOSCOPY (EGD);  COMBINED ESOPHAGOSCOPY, GASTROSCOPY, DUODENOSCOPY (EGD) [[id number]]attempted removal of foreign body;  Surgeon: Pamela Perez MD;  [facility]: [facility]     ESOPHAGOSCOPY, GASTROSCOPY, DUODENOSCOPY (EGD), COMBINED N/A 6/9/2017    Procedure: COMBINED ESOPHAGOSCOPY, GASTROSCOPY, DUODENOSCOPY (EGD), REMOVE FOREIGN BODY;  Esophagoscopy, Gastroscopy, Duodenoscopy, Removal of Foreign Body;  Surgeon: Dejon Marsh MD;  Location: [facility]     ESOPHAGOSCOPY, GASTROSCOPY, DUODENOSCOPY (EGD), COMBINED N/A 1/6/2018    Procedure: COMBINED ESOPHAGOSCOPY, GASTROSCOPY, DUODENOSCOPY (EGD), REMOVE FOREIGN BODY;  COMBINED ESOPHAGOSCOPY, GASTROSCOPY, DUODENOSCOPY (EGD) [by pascal net and snare with profol sedation;  Surgeon: Feliciano Emmanuel MD;  Location:  [facility]     ESOPHAGOSCOPY, GASTROSCOPY, DUODENOSCOPY (EGD), COMBINED N/A 3/19/2018    Procedure: COMBINED ESOPHAGOSCOPY, GASTROSCOPY, DUODENOSCOPY (EGD), REMOVE FOREIGN BODY;   Esophagodscopy, Gastroscopy, Duodenoscopy,Foreign Body Removal;  Surgeon: Brice Guzmán MD;  [facility]     ESOPHAGOSCOPY, GASTROSCOPY, DUODENOSCOPY (EGD), COMBINED N/A 4/16/2018    Procedure: COMBINED ESOPHAGOSCOPY, GASTROSCOPY, DUODENOSCOPY (EGD), REMOVE FOREIGN BODY;  Esophagogastroduodenoscopy  Foreign Body Removal X 2;  Surgeon: Royer Moise MD;  [facility]     ESOPHAGOSCOPY, GASTROSCOPY, DUODENOSCOPY (EGD), COMBINED N/A 6/1/2018    Procedure: COMBINED ESOPHAGOSCOPY, GASTROSCOPY, DUODENOSCOPY (EGD), REMOVE FOREIGN BODY;  COMBINED ESOPHAGOSCOPY, GASTROSCOPY, DUODENOSCOPY with removal of foreign body, propofol sedation by anesthesia;  Surgeon: Brice Martinez MD;  Location:  GI     ESOPHAGOSCOPY, GASTROSCOPY, DUODENOSCOPY (EGD), COMBINED N/A 7/25/2018    Procedure: COMBINED ESOPHAGOSCOPY, GASTROSCOPY, DUODENOSCOPY (EGD), REMOVE FOREIGN BODY;;  Surgeon:  Candy Castelan MD;  Location:  GI     ESOPHAGOSCOPY, GASTROSCOPY, DUODENOSCOPY (EGD), COMBINED N/A 7/28/2018    Procedure: COMBINED ESOPHAGOSCOPY, GASTROSCOPY, DUODENOSCOPY (EGD), REMOVE FOREIGN BODY;  COMBINED ESOPHAGOSCOPY, GASTROSCOPY, DUODENOSCOPY (EGD), REMOVE FOREIGN BODY;  Surgeon: Brice Guzmán MD;  [facility]     ESOPHAGOSCOPY, GASTROSCOPY, DUODENOSCOPY (EGD), COMBINED N/A 7/31/2018    Procedure: COMBINED ESOPHAGOSCOPY, GASTROSCOPY, DUODENOSCOPY (EGD);  COMBINED ESOPHAGOSCOPY, GASTROSCOPY, DUODENOSCOPY (EGD) TO REMOVE FOREIGN BODY;  Surgeon: Lokesh Paula MD;  [facility]     ESOPHAGOSCOPY, GASTROSCOPY, DUODENOSCOPY (EGD), COMBINED N/A 8/4/2018    Procedure: COMBINED ESOPHAGOSCOPY, GASTROSCOPY, DUODENOSCOPY (EGD), REMOVE FOREIGN BODY;   combined esophagoscopy, gastroscopy, duodenoscopy, REMOVE FOREIGN BODY ;  Surgeon: Lokesh Paula MD;  [facility]     ESOPHAGOSCOPY, GASTROSCOPY, DUODENOSCOPY (EGD), COMBINED N/A 10/6/2019    Procedure: ESOPHAGOGASTRODUODENOSCOPY (EGD) with fireign body removal x2, esophageal stent placement with floroscopy;  Surgeon: Timoteo Espana MD;  [facility]     ESOPHAGOSCOPY, GASTROSCOPY, DUODENOSCOPY (EGD), COMBINED N/A 12/2/2019    Procedure: Esophagogastroduodenoscopy with esophageal stent removal, esophogram;  Surgeon: Kailee Tena MD;  [facility]     ESOPHAGOSCOPY, GASTROSCOPY, DUODENOSCOPY (EGD), COMBINED N/A 12/17/2019    Procedure: ESOPHAGOGASTRODUODENOSCOPY, WITH FOREIGN BODY REMOVAL;  Surgeon: Pamela Perez MD;  [facility]     ESOPHAGOSCOPY, GASTROSCOPY, DUODENOSCOPY (EGD), COMBINED N/A 12/13/2019    Procedure: ESOPHAGOGASTRODUODENOSCOPY, WITH FOREIGN BODY REMOVAL;  Surgeon: Samia Stanton MD;  [facility]     ESOPHAGOSCOPY, GASTROSCOPY, DUODENOSCOPY (EGD), COMBINED N/A 12/28/2019    Procedure: ESOPHAGOGASTRODUODENOSCOPY (EGD) Removal of Foreign Body X 2;  Surgeon: Huy Kelley  MD Siddharth;  [facility]: [facility]     ESOPHAGOSCOPY, GASTROSCOPY, DUODENOSCOPY (EGD), COMBINED N/A 1/5/2020    Procedure: ESOPHAGOGASTRODUOENOSCOPY WITH FOREIGN BODY REMOVAL;  Surgeon: Pamela Perez MD;  [facility]     ESOPHAGOSCOPY, GASTROSCOPY, DUODENOSCOPY (EGD), COMBINED N/A 1/3/2020    Procedure: ESOPHAGOGASTRODUODENOSCOPY (EGD) REMOVAL OF FOREIGN BODY.;  Surgeon: Pamela Perez MD;  [facility]     ESOPHAGOSCOPY, GASTROSCOPY, DUODENOSCOPY (EGD), COMBINED N/A 1/13/2020    Procedure: ESOPHAGOGASTRODUODENOSCOPY (EGD) for foreign body removal;  Surgeon: Lokesh Paula MD;  [facility]     ESOPHAGOSCOPY, GASTROSCOPY, DUODENOSCOPY (EGD), COMBINED N/A 1/18/2020    Procedure: Diagnostic ESOPHAGOGASTRODUODENOSCOPY (EGD;  Surgeon: Lokesh Paula MD;  [facility]     ESOPHAGOSCOPY, GASTROSCOPY, DUODENOSCOPY (EGD), COMBINED N/A 3/29/2020    Procedure: UPPER ENDOSCOPY WITH FOREIGN BODY REMOVAL;  Surgeon: Doug Hansen MD;  [facility]     ESOPHAGOSCOPY, GASTROSCOPY, DUODENOSCOPY (EGD), COMBINED N/A 7/11/2020    Procedure: ESOPHAGOGASTRODUODENOSCOPY (EGD); Upper Endoscopy WITH FOREIGN BODY REMOVAL;  Surgeon: Lyndsey Mendoza DO;  [facility]     ESOPHAGOSCOPY, GASTROSCOPY, DUODENOSCOPY (EGD), COMBINED N/A 7/29/2020    Procedure: ESOPHAGOGASTRODUODENOSCOPY REMOVAL OF FOREIGN BODY;  Surgeon: Samia Stanton MD;  [facility]     ESOPHAGOSCOPY, GASTROSCOPY, DUODENOSCOPY (EGD), COMBINED N/A 8/1/2020    Procedure: ESOPHAGOGASTRODUODENOSCOPY, WITH FOREIGN BODY REMOVAL;  Surgeon: Pamela Perez MD;  [facility]     ESOPHAGOSCOPY, GASTROSCOPY, DUODENOSCOPY (EGD), COMBINED N/A 8/18/2020    Procedure: ESOPHAGOGASTRODUODENOSCOPY (EGD) for foreign body removal;  Surgeon: Pamela Perez MD;  [facility]     ESOPHAGOSCOPY, GASTROSCOPY, DUODENOSCOPY (EGD), COMBINED N/A 8/27/2020    Procedure: ESOPHAGOGASTRODUODENOSCOPY (EGD)  with foreign body removal;  Surgeon: Campbell Rogers MD;  [facility]     ESOPHAGOSCOPY, GASTROSCOPY, DUODENOSCOPY (EGD), COMBINED N/A 9/18/2020    Procedure: ESOPHAGOGASTRODUODENOSCOPY (EGD) with foreign body removal;  Surgeon: Dick Gillis MD;  [facility]     ESOPHAGOSCOPY, GASTROSCOPY, DUODENOSCOPY (EGD), COMBINED N/A 11/18/2020    Procedure: ESOPHAGOGASTRODUODENOSCOPY, WITH FOREIGN BODY REMOVAL;  Surgeon: Felipe Ulloa DO;  [facility]     EXAM UNDER ANESTHESIA ANUS N/A 1/10/2017    Procedure: EXAM UNDER ANESTHESIA ANUS;  Surgeon: Annmarie Haynes MD;  [facility]     EXAM UNDER ANESTHESIA RECTUM N/A 7/19/2018    Procedure: EXAM UNDER ANESTHESIA RECTUM;  EXAM UNDER ANESTHESIA, REMOVAL OF RECTAL FOREIGN BODY;  Surgeon: Annmarie Haynes MD;  [facility]     HC REMOVE FECAL IMPACTION OR FB W ANESTHESIA N/A 12/18/2016    Procedure: REMOVE FECAL IMPACTION/FOREIGN BODY UNDER ANESTHESIA;  Surgeon: Soham Cano MD;  [facility]     KNEE SURGERY - removed a small tissue mass from knee Right      LAPAROSCOPIC ABLATION ENDOMETRIOSIS       LAPAROTOMY EXPLORATORY N/A 1/10/2017    Procedure: LAPAROTOMY EXPLORATORY;  Surgeon: Annmarie Haynes MD;  [facility]     LAPAROTOMY EXPLORATORY  09/11/2019    Manual manipulation of bowel to remove pill bottle in rectum     lymph nodes removed from neck; benign  age 7     MAMMOPLASTY REDUCTION Bilateral      RELEASE CARPAL TUNNEL Bilateral      SIGMOIDOSCOPY FLEXIBLE N/A 1/10/2017    Procedure: SIGMOIDOSCOPY FLEXIBLE;  Surgeon: Annmarie Haynes MD;  [facility]     SIGMOIDOSCOPY FLEXIBLE N/A 5/8/2018    Procedure: SIGMOIDOSCOPY FLEXIBLE;  flex sig with foreign body removal using snare and rattooth forcep;  Surgeon: Soham Cano MD;  Location:  [facility]     SIGMOIDOSCOPY FLEXIBLE N/A 2/20/2019    Procedure: Exam under anesthesia Colonoscopy with attempted  removal of rectal foreign body;   Surgeon: Estrada Chávez MD;  Location[facility]      Allergies   Allergen Reactions     Amoxicillin-Pot Clavulanate Other (See Comments), Swelling and Rash     PN: facial swelling, left side. Also had cortisone injection the same day as she started the Augmentin.  Noted in downtime recovery of chart.    PN: facial swelling, left side. Also had cortisone injection the same day as she started the Augmentin.; HUT Comment: PN: facial swelling, left side. Also had cortisone injection the same day as she started the Augmentin.Noted in downtime recovery of chart.; HUT Reaction: Rash; HUT Reaction: Unknown; HUT Reaction Type: Allergy; HUT Severity: Med; HUT Noted: [id number]     Oseltamivir Hives     med stopped, PN: med stopped  med stopped, PN: med stopped; HUT Comment: med stopped, PN: med stopped; HUT Reaction: Hives; HUT Reaction Type: Allergy; HUT Severity: Med; HUT Noted: [id number]     Penicillins Anaphylaxis     HUT Reaction: Anaphylaxis; HUT Reaction Type: Allergy; HUT Severity: High; HUT Noted: [id number]     Vancomycin Itching, Swelling and Rash     Other reaction(s): Redness  Flushed face, very itchy; HUT Comment: Flushed face, very itchy; HUT Reaction: Angioedema; HUT Reaction: Redness; HUT Severity: Med; HUT Noted: [id number]    facial     Hydrocodone Nausea and Vomiting and GI Disturbance     vomiting for days, PN: vomiting for days; HUT Comment: vomiting for days; HUT Reaction: Gastrointestinal; HUT Reaction: Nausea And Vomiting; HUT Reaction Type: Intolerance; HUT Severity: Med; HUT Noted: [id number]  vomiting for days       Blood-Group Specific Substance Other (See Comments)     Patient has an anti-Cw and non-specific antibodies. Blood product orders may be delayed. Draw one red top and two purple top tubes for all type/screen/crossmatch orders.  Patient has anti-Cw, anti-K (Angella), Warm auto and nonspecific antibodies. Blood products may be delayed. Draw patient 24 hours prior to transfusion. Draw one red top and two  purple top tubes for all type and screen orders.     Oxycodone Swelling     Cephalosporins Rash     Influenza Vaccines Other (See Comments) and Nausea and Vomiting     HUT Reaction: Nausea And Vomiting; HUT Reaction Type: Intolerance; HUT Severity: Low; HUT Noted: [id number]     Lamotrigine Rash     Possibly associated with Lamictal.   HUT Comment: Possibly associated with Lamictal. ; HUT Reaction: Rash; HUT Reaction Type: Allergy; HUT Severity: Low; HUT Noted: [id number]     Latex Rash     HUT Reaction: Rash; HUT Reaction Type: Allergy; HUT Severity: Low; HUT Noted: [id number]        Anesthesia Evaluation     . Pt has had prior anesthetic.     No history of anesthetic complications          ROS/MED HX    ENT/Pulmonary:       Neurologic:       Cardiovascular:         METS/Exercise Tolerance:     Hematologic:     (+) History of blood clots -      Musculoskeletal:         GI/Hepatic: Comment: Foreign body ingestion    (+) GERD       Renal/Genitourinary:      (-) renal disease   Endo:     (+) Obesity, .      Psychiatric:     (+) psychiatric history anxiety, depression and bipolar      Infectious Disease:         Malignancy:         Other:                     [facility] AN PHYSICAL EXAM    Assessment:   ASA SCORE: 3    H&P: History and physical reviewed and following examination; no interval change.   Smoking Status:  Non-Smoker/Unknown        Plan:   Anes. Type:  General   Pre-Medication: None   Induction:  IV (Standard)   Airway: ETT; Oral   Access/Monitoring: PIV   Maintenance: Balanced     Postop Plan:   Postop Pain: Opioids  Postop Sedation/Airway: Not planned  Disposition: Outpatient     PONV Management:   Adult Risk Factors: Female, Non-Smoker, Postop Opioids   Prevention: Ondansetron, Dexamethasone       Comments for Plan/Consent:  30 year old female, ASA 3, with obesity and psychiatric illness including recurrent ingestion of foreign objects presenting for retrieval of foreign object via EGD.   Care plan in place to  avoid rewarding medications (benzodiazapines, opioids).  - GETA  - single IV                 Siddharth Castaneda III, MD

## 2020-11-28 NOTE — ED PROVIDER NOTES
[place] EMERGENCY DEPARTMENT ([facility])  11/27/20   History     Chief Complaint   Patient presents with     Swallowed Foreign Body     The history is provided by the patient and medical records.     Nevin Alvarado is a 30 year old female with a past medical history significant for bipolar disorder, ADD, PTSD and frequent presentations who presents to the Emergency Department for evaluation of a foreign body ingestion.  Per chart review, the patient was last seen at the [facility] ED on 11/17/2020 with another foreign body ingestion.  During this visit, the patient endorsed ongoing problems with foreign body ingestion for many years.  She was noted to have swallowed a piece of wire prior to this visit.  The patient underwent an abdominal XR with impression noted below.  Patient denied any actual intent to harm herself with this ingestion.  Basic labs were obtained, and CBC demonstrated mild anemia with a hemoglobin of 10.6.  Other basic labs were WNL.  BMP did demonstrate mild hyperglycemia with a glucose of 133, otherwise WNL.  Urine pregnancy test negative.  GI was consulted with plan to evaluate her, and remove the object via scope.    The patient presents to the ED today after intentionally ingesting a metal paperclip.  Patient states that she has swallowed metal paperclips in the past, but states this is been the first time she has swallowed a paperclip in a while.  She still believes the object is in her throat.  She states that she can feel slight pain upon swallowing.  No difficulty breathing.  She states that she swallowed this 1.5 hours prior to ED arrival.  She currently denies any abdominal pain, nausea, or vomiting.  Patient's last meal was 2 hours ago.  No other symptoms noted.        EXAM: XR ABDOMEN PORT 1 VW-11/17/2020  IMPRESSION:   There is a new, thin 9 cm long metallic density compatible with a wire projecting over the left upper abdomen. This is probably the swallowed foreign  body/wire, likely within the stomach. Right upper quadrant surgical clips. Normal bowel gas pattern. Thoracolumbar scoliosis.    I have reviewed the Medications, Allergies, Past Medical and Surgical History, and Social History in the [facility] system.  PAST MEDICAL HISTORY:   Past Medical History:   Diagnosis Date     ADD (attention deficit disorder)      Anorexia nervosa with bulimia     history of; on Topamax     Anxiety      Borderline personality disorder (H)      Depression      H/O self-harm      History of pulmonary embolism 12/2019    Provoked. Completed 3 month course of Apixaban     Morbid obesity (H)      Neuropathy      PTSD (post-traumatic stress disorder)      Rectal foreign body - Recurrent issue, self placed      Suicide attempt (H) 2/21/2018     Swallowed foreign body - Recurrent issue, self placed        PAST SURGICAL HISTORY:   Past Surgical History:   Procedure Laterality Date     COMBINED ESOPHAGOSCOPY, GASTROSCOPY, DUODENOSCOPY (EGD), REPLACE ESOPHAGEAL STENT N/A 10/9/2019    Procedure: Upper Endoscopy with Suture Placement;  Surgeon: Zurdo Ramirez MD;  [facility]     ESOPHAGOSCOPY, GASTROSCOPY, DUODENOSCOPY (EGD), COMBINED N/A 3/9/2017    Procedure: COMBINED ESOPHAGOSCOPY, GASTROSCOPY, DUODENOSCOPY (EGD), REMOVE FOREIGN BODY;  Surgeon: Avis Guzmán MD;  [facility]     ESOPHAGOSCOPY, GASTROSCOPY, DUODENOSCOPY (EGD), COMBINED N/A 4/20/2017    Procedure: COMBINED ESOPHAGOSCOPY, GASTROSCOPY, DUODENOSCOPY (EGD), REMOVE FOREIGN BODY;  EGD removal Foregin body;  Surgeon: Lokesh Paula MD;  [facility]     ESOPHAGOSCOPY, GASTROSCOPY, DUODENOSCOPY (EGD), COMBINED N/A 6/12/2017    Procedure: COMBINED ESOPHAGOSCOPY, GASTROSCOPY, DUODENOSCOPY (EGD);  COMBINED ESOPHAGOSCOPY, GASTROSCOPY, DUODENOSCOPY (EGD) [[id number]]attempted removal of foreign body;  Surgeon: Pamela Perez MD;  [facility]     ESOPHAGOSCOPY, GASTROSCOPY, DUODENOSCOPY (EGD),  COMBINED N/A 6/9/2017    Procedure: COMBINED ESOPHAGOSCOPY, GASTROSCOPY, DUODENOSCOPY (EGD), REMOVE FOREIGN BODY;  Esophagoscopy, Gastroscopy, Duodenoscopy, Removal of Foreign Body;  Surgeon: Dejon Marsh MD;  [facility]     ESOPHAGOSCOPY, GASTROSCOPY, DUODENOSCOPY (EGD), COMBINED N/A 1/6/2018    Procedure: COMBINED ESOPHAGOSCOPY, GASTROSCOPY, DUODENOSCOPY (EGD), REMOVE FOREIGN BODY;  COMBINED ESOPHAGOSCOPY, GASTROSCOPY, DUODENOSCOPY (EGD) [by pascal net and snare with profol sedation;  Surgeon: Feliciano Emmanuel MD;  Location:  GI     ESOPHAGOSCOPY, GASTROSCOPY, DUODENOSCOPY (EGD), COMBINED N/A 3/19/2018    Procedure: COMBINED ESOPHAGOSCOPY, GASTROSCOPY, DUODENOSCOPY (EGD), REMOVE FOREIGN BODY;   Esophagodscopy, Gastroscopy, Duodenoscopy,Foreign Body Removal;  Surgeon: Brice Guzmán MD;  [facility]     ESOPHAGOSCOPY, GASTROSCOPY, DUODENOSCOPY (EGD), COMBINED N/A 4/16/2018    Procedure: COMBINED ESOPHAGOSCOPY, GASTROSCOPY, DUODENOSCOPY (EGD), REMOVE FOREIGN BODY;  Esophagogastroduodenoscopy  Foreign Body Removal X 2;  Surgeon: Royer Moise MD;  [facility]     ESOPHAGOSCOPY, GASTROSCOPY, DUODENOSCOPY (EGD), COMBINED N/A 6/1/2018    Procedure: COMBINED ESOPHAGOSCOPY, GASTROSCOPY, DUODENOSCOPY (EGD), REMOVE FOREIGN BODY;  COMBINED ESOPHAGOSCOPY, GASTROSCOPY, DUODENOSCOPY with removal of foreign body, propofol sedation by anesthesia;  Surgeon: Brice Martinez MD;  Location:  GI     ESOPHAGOSCOPY, GASTROSCOPY, DUODENOSCOPY (EGD), COMBINED N/A 7/25/2018    Procedure: COMBINED ESOPHAGOSCOPY, GASTROSCOPY, DUODENOSCOPY (EGD), REMOVE FOREIGN BODY;;  Surgeon: Candy Castelan MD;  Location:  GI     ESOPHAGOSCOPY, GASTROSCOPY, DUODENOSCOPY (EGD), COMBINED N/A 7/28/2018    Procedure: COMBINED ESOPHAGOSCOPY, GASTROSCOPY, DUODENOSCOPY (EGD), REMOVE FOREIGN BODY;  COMBINED ESOPHAGOSCOPY, GASTROSCOPY, DUODENOSCOPY (EGD), REMOVE FOREIGN BODY;  Surgeon: Brice Guzmán,  MD;  [facility]     ESOPHAGOSCOPY, GASTROSCOPY, DUODENOSCOPY (EGD), COMBINED N/A 7/31/2018    Procedure: COMBINED ESOPHAGOSCOPY, GASTROSCOPY, DUODENOSCOPY (EGD);  COMBINED ESOPHAGOSCOPY, GASTROSCOPY, DUODENOSCOPY (EGD) TO REMOVE FOREIGN BODY;  Surgeon: Lokesh Paula MD;  [facility]     ESOPHAGOSCOPY, GASTROSCOPY, DUODENOSCOPY (EGD), COMBINED N/A 8/4/2018    Procedure: COMBINED ESOPHAGOSCOPY, GASTROSCOPY, DUODENOSCOPY (EGD), REMOVE FOREIGN BODY;   combined esophagoscopy, gastroscopy, duodenoscopy, REMOVE FOREIGN BODY ;  Surgeon: Lokesh Paula MD;  [facility]     ESOPHAGOSCOPY, GASTROSCOPY, DUODENOSCOPY (EGD), COMBINED N/A 10/6/2019    Procedure: ESOPHAGOGASTRODUODENOSCOPY (EGD) with fireign body removal x2, esophageal stent placement with floroscopy;  Surgeon: Timoteo Espana MD;  [facility]     ESOPHAGOSCOPY, GASTROSCOPY, DUODENOSCOPY (EGD), COMBINED N/A 12/2/2019    Procedure: Esophagogastroduodenoscopy with esophageal stent removal, esophogram;  Surgeon: Kailee Tena MD;  [facility]     ESOPHAGOSCOPY, GASTROSCOPY, DUODENOSCOPY (EGD), COMBINED N/A 12/17/2019    Procedure: ESOPHAGOGASTRODUODENOSCOPY, WITH FOREIGN BODY REMOVAL;  Surgeon: Pamela Perez MD;  [facility]     ESOPHAGOSCOPY, GASTROSCOPY, DUODENOSCOPY (EGD), COMBINED N/A 12/13/2019    Procedure: ESOPHAGOGASTRODUODENOSCOPY, WITH FOREIGN BODY REMOVAL;  Surgeon: Samia Stanton MD;  [facility]     ESOPHAGOSCOPY, GASTROSCOPY, DUODENOSCOPY (EGD), COMBINED N/A 12/28/2019    Procedure: ESOPHAGOGASTRODUODENOSCOPY (EGD) Removal of Foreign Body X 2;  Surgeon: Huy Kelley MD;  [facility]     ESOPHAGOSCOPY, GASTROSCOPY, DUODENOSCOPY (EGD), COMBINED N/A 1/5/2020    Procedure: ESOPHAGOGASTRODUOENOSCOPY WITH FOREIGN BODY REMOVAL;  Surgeon: Pamela Perez MD;  [facility]     ESOPHAGOSCOPY, GASTROSCOPY, DUODENOSCOPY (EGD), COMBINED N/A 1/3/2020    Procedure:  ESOPHAGOGASTRODUODENOSCOPY (EGD) REMOVAL OF FOREIGN BODY.;  Surgeon: Pamela Perez MD;  [facility]     ESOPHAGOSCOPY, GASTROSCOPY, DUODENOSCOPY (EGD), COMBINED N/A 1/13/2020    Procedure: ESOPHAGOGASTRODUODENOSCOPY (EGD) for foreign body removal;  Surgeon: Lokesh Paula MD;  [facility]     ESOPHAGOSCOPY, GASTROSCOPY, DUODENOSCOPY (EGD), COMBINED N/A 1/18/2020    Procedure: Diagnostic ESOPHAGOGASTRODUODENOSCOPY (EGD;  Surgeon: Lokesh Paula MD;  [facility]     ESOPHAGOSCOPY, GASTROSCOPY, DUODENOSCOPY (EGD), COMBINED N/A 3/29/2020    Procedure: UPPER ENDOSCOPY WITH FOREIGN BODY REMOVAL;  Surgeon: Doug Hansen MD;  [facility]     ESOPHAGOSCOPY, GASTROSCOPY, DUODENOSCOPY (EGD), COMBINED N/A 7/11/2020    Procedure: ESOPHAGOGASTRODUODENOSCOPY (EGD); Upper Endoscopy WITH FOREIGN BODY REMOVAL;  Surgeon: Lyndsey Mendoza DO;  [facility]     ESOPHAGOSCOPY, GASTROSCOPY, DUODENOSCOPY (EGD), COMBINED N/A 7/29/2020    Procedure: ESOPHAGOGASTRODUODENOSCOPY REMOVAL OF FOREIGN BODY;  Surgeon: Samia Stanton MD;  [facility]     ESOPHAGOSCOPY, GASTROSCOPY, DUODENOSCOPY (EGD), COMBINED N/A 8/1/2020    Procedure: ESOPHAGOGASTRODUODENOSCOPY, WITH FOREIGN BODY REMOVAL;  Surgeon: Pamela Perez MD;  [facility]     ESOPHAGOSCOPY, GASTROSCOPY, DUODENOSCOPY (EGD), COMBINED N/A 8/18/2020    Procedure: ESOPHAGOGASTRODUODENOSCOPY (EGD) for foreign body removal;  Surgeon: Pamela Perez MD;  [facility]     ESOPHAGOSCOPY, GASTROSCOPY, DUODENOSCOPY (EGD), COMBINED N/A 8/27/2020    Procedure: ESOPHAGOGASTRODUODENOSCOPY (EGD) with foreign body removal;  Surgeon: Campbell Rogers MD;  [facility]     ESOPHAGOSCOPY, GASTROSCOPY, DUODENOSCOPY (EGD), COMBINED N/A 9/18/2020    Procedure: ESOPHAGOGASTRODUODENOSCOPY (EGD) with foreign body removal;  Surgeon: Dick Gillis MD;  [facility]     ESOPHAGOSCOPY, GASTROSCOPY,  DUODENOSCOPY (EGD), COMBINED N/A 11/18/2020    Procedure: ESOPHAGOGASTRODUODENOSCOPY, WITH FOREIGN BODY REMOVAL;  Surgeon: Felipe Ulloa DO;  [facility]     ESOPHAGOSCOPY, GASTROSCOPY, DUODENOSCOPY (EGD), COMBINED N/A 11/28/2020    Procedure: ESOPHAGOGASTRODUODENOSCOPY (EGD);  Surgeon: Campbell Rogers MD;  [facility]     EXAM UNDER ANESTHESIA ANUS N/A 1/10/2017    Procedure: EXAM UNDER ANESTHESIA ANUS;  Surgeon: Annmarie Haynes MD;  [facility]     EXAM UNDER ANESTHESIA RECTUM N/A 7/19/2018    Procedure: EXAM UNDER ANESTHESIA RECTUM;  EXAM UNDER ANESTHESIA, REMOVAL OF RECTAL FOREIGN BODY;  Surgeon: Annmarie Haynes MD;  [facility]     HC REMOVE FECAL IMPACTION OR FB W ANESTHESIA N/A 12/18/2016    Procedure: REMOVE FECAL IMPACTION/FOREIGN BODY UNDER ANESTHESIA;  Surgeon: Soham Cano MD;  Location:  OR     KNEE SURGERY - removed a small tissue mass from knee Right      LAPAROSCOPIC ABLATION ENDOMETRIOSIS       LAPAROTOMY EXPLORATORY N/A 1/10/2017    Procedure: LAPAROTOMY EXPLORATORY;  Surgeon: Annmarie Haynes MD;  [facility]     LAPAROTOMY EXPLORATORY  09/11/2019    Manual manipulation of bowel to remove pill bottle in rectum     lymph nodes removed from neck; benign  age 7     MAMMOPLASTY REDUCTION Bilateral      RELEASE CARPAL TUNNEL Bilateral      SIGMOIDOSCOPY FLEXIBLE N/A 1/10/2017    Procedure: SIGMOIDOSCOPY FLEXIBLE;  Surgeon: Annmarie Haynes MD;  [facility]     SIGMOIDOSCOPY FLEXIBLE N/A 5/8/2018    Procedure: SIGMOIDOSCOPY FLEXIBLE;  flex sig with foreign body removal using snare and rattooth forcep;  Surgeon: Soham Cano MD;  Location:  GI     SIGMOIDOSCOPY FLEXIBLE N/A 2/20/2019    Procedure: Exam under anesthesia Colonoscopy with attempted  removal of rectal foreign body;  Surgeon: Estrada Chávez MD;  [facility]       Past medical history, past surgical history, medications, and allergies were reviewed  with the patient. Additional pertinent items: None    FAMILY HISTORY:   Family History   Problem Relation Age of Onset     Diabetes Type 2  Maternal Grandmother      Diabetes Type 2  Paternal Grandmother      Breast Cancer Paternal Grandmother      Cerebrovascular Disease Father 53     Myocardial Infarction No family hx of      Coronary Artery Disease Early Onset No family hx of      Ovarian Cancer No family hx of      Colon Cancer No family hx of        SOCIAL HISTORY:   Social History     Tobacco Use     Smoking status: Never Smoker     Smokeless tobacco: Never Used   Substance Use Topics     Alcohol use: No     Alcohol/week: 0.0 standard drinks     Social history was reviewed with the patient. Additional pertinent items: None      Discharge Medication List as of 12/1/2020  9:59 AM      CONTINUE these medications which have NOT CHANGED    Details   acetaminophen (TYLENOL) 500 MG tablet Take 500-1,000 mg by mouth every 8 hours as needed for mild pain , Historical      albuterol (PROAIR HFA/PROVENTIL HFA/VENTOLIN HFA) 108 (90 Base) MCG/ACT inhaler Inhale 2 puffs into the lungs every 4 hours as needed for shortness of breath / dyspnea or wheezing , HistoricalPharmacy may dispense brand covered by insurance (Proair, or proventil or ventolin or generic albuterol inhaler)      busPIRone (BUSPAR) 15 MG tablet Take 1 tablet (15 mg) by mouth 2 times daily, No Print Out      Cholecalciferol (VITAMIN D) 50 MCG (2000 UT) CAPS Take 2,000 Units by mouth daily , Historical      cloNIDine (CATAPRES) 0.1 MG tablet Take 0.1 mg by mouth 2 times daily , Historical      desvenlafaxine (PRISTIQ) 100 MG 24 hr tablet Take 1 tablet (100 mg) by mouth every morning, Historical      hydroxychloroquine (PLAQUENIL) 200 MG tablet Take 200 mg by mouth 2 times daily, Historical      lurasidone (LATUDA) 60 MG TABS tablet Take 60 mg by mouth daily (with dinner) , Historical      metFORMIN (GLUCOPHAGE-XR) 500 MG 24 hr tablet Take 1,000 mg by mouth  daily (with dinner) , Historical      pregabalin (LYRICA) 50 MG capsule Take 50 mg by mouth 3 times daily, Historical      valACYclovir (VALTREX) 1000 mg tablet Take 2 tablets by mouth two times daily for one day. Use as needed at onset of cold sore. , Historical      sennosides (SENOKOT) 8.6 MG tablet Take 1 tablet by mouth 2 times daily as needed for constipation , Historical                Allergies   Allergen Reactions     Amoxicillin-Pot Clavulanate Other (See Comments), Swelling and Rash     PN: facial swelling, left side. Also had cortisone injection the same day as she started the Augmentin.  Noted in downtime recovery of chart.    PN: facial swelling, left side. Also had cortisone injection the same day as she started the Augmentin.; HUT Comment: PN: facial swelling, left side. Also had cortisone injection the same day as she started the Augmentin.Noted in downtime recovery of chart.; HUT Reaction: Rash; HUT Reaction: Unknown; HUT Reaction Type: Allergy; HUT Severity: Med; HUT Noted: [id number]     Oseltamivir Hives     med stopped, PN: med stopped  med stopped, PN: med stopped; HUT Comment: med stopped, PN: med stopped; HUT Reaction: Hives; HUT Reaction Type: Allergy; HUT Severity: Med; HUT Noted: [id number]     Penicillins Anaphylaxis     HUT Reaction: Anaphylaxis; HUT Reaction Type: Allergy; HUT Severity: High; HUT Noted: [id number]     Vancomycin Itching, Swelling and Rash     Other reaction(s): Redness  Flushed face, very itchy; HUT Comment: Flushed face, very itchy; HUT Reaction: Angioedema; HUT Reaction: Redness; HUT Severity: Med; HUT Noted: [id number]    facial     Hydrocodone Nausea and Vomiting and GI Disturbance     vomiting for days, PN: vomiting for days; HUT Comment: vomiting for days; HUT Reaction: Gastrointestinal; HUT Reaction: Nausea And Vomiting; HUT Reaction Type: Intolerance; HUT Severity: Med; HUT Noted: [id number]  vomiting for days       Blood-Group Specific Substance Other (See Comments)      Patient has an anti-Cw and non-specific antibodies. Blood product orders may be delayed. Draw one red top and two purple top tubes for all type/screen/crossmatch orders.  Patient has anti-Cw, anti-K ([place]), Warm auto and nonspecific antibodies. Blood products may be delayed. Draw patient 24 hours prior to transfusion. Draw one red top and two purple top tubes for all type and screen orders.     Oxycodone Swelling     Cephalosporins Rash     Influenza Vaccines Other (See Comments) and Nausea and Vomiting     HUT Reaction: Nausea And Vomiting; HUT Reaction Type: Intolerance; HUT Severity: Low; HUT Noted: [id number]     Lamotrigine Rash     Possibly associated with Lamictal.   HUT Comment: Possibly associated with Lamictal. ; HUT Reaction: Rash; HUT Reaction Type: Allergy; HUT Severity: Low; HUT Noted: [id number]     Latex Rash     HUT Reaction: Rash; HUT Reaction Type: Allergy; HUT Severity: Low; HUT Noted: [id number]        Review of Systems   Constitutional: Negative for fever.   HENT: Positive for sore throat. Negative for congestion.    Eyes: Negative for redness.   Respiratory: Negative for shortness of breath.    Cardiovascular: Negative for chest pain.   Gastrointestinal: Negative for abdominal pain.   Genitourinary: Negative for difficulty urinating.   Musculoskeletal: Negative for arthralgias and neck stiffness.   Skin: Negative for color change.   Neurological: Negative for headaches.   Psychiatric/Behavioral: Negative for confusion.   All other systems reviewed and are negative.        Physical Exam   BP: (!) 145/101  Pulse: 106  Temp: 99.2  F (37.3  C)  Resp: 15  SpO2: 99 %      Physical Exam  Vitals signs and nursing note reviewed.   Constitutional:       General: She is not in acute distress.     Appearance: She is not diaphoretic.   HENT:      Head: Atraumatic.      Mouth/Throat:      Pharynx: No oropharyngeal exudate.   Eyes:      General: No scleral icterus.     Pupils: Pupils are equal, round, and reactive  to light.   Cardiovascular:      Rate and Rhythm: Normal rate and regular rhythm.      Heart sounds: Normal heart sounds.   Pulmonary:      Effort: No respiratory distress.      Breath sounds: Normal breath sounds.   Abdominal:      General: Bowel sounds are normal.      Palpations: Abdomen is soft.      Tenderness: There is no abdominal tenderness.   Musculoskeletal:         General: No tenderness.   Skin:     General: Skin is warm.      Findings: No rash.   Neurological:      General: No focal deficit present.      Mental Status: Mental status is at baseline.         ED Course   7:46 PM  The patient was seen and examined by Dr. Josue Cuevas in Room ED08.        Procedures                           No results found for this or any previous visit (from the past 24 hour(s)).  Medications   ketorolac (TORADOL) injection 15 mg (15 mg Intravenous Given 11/27/20 2146)   ketorolac (TORADOL) injection 15 mg (15 mg Intravenous Given 11/28/20 0037)   fentaNYL (PF) (SUBLIMAZE) injection 25 mcg (25 mcg Intravenous Given 11/28/20 0227)   haloperidol lactate (HALDOL) injection 2 mg (2 mg Intravenous Given 11/28/20 0425)   acetaminophen (TYLENOL) tablet 650 mg (650 mg Oral Given 11/28/20 1039)   desvenlafaxine (PRISTIQ) 24 hr tablet 100 mg (100 mg Oral Given 11/28/20 1424)   ondansetron (ZOFRAN-ODT) ODT tab 8 mg (8 mg Oral Given 11/28/20 1323)   acetaminophen (TYLENOL) tablet 1,000 mg (1,000 mg Oral Given 11/28/20 1451)             Assessments & Plan (with Medical Decision Making)   Nevin Alvarado is a 30 year old female with a past medical history significant for bipolar disorder, ADD, PTSD and frequent presentations who presents to the Emergency Department after ingestion of a paper clip. She states that she swallowed this 1.5 hours prior to ED arrival. She denies any suicidal ideation motivating the ingestion, but, due to patient straitening clip I have higher suspicion for suicidal ideation.    Discussed case with GI,  they will scope patient in the morning.  I will admit patient to medicine.  Patient 72-hour hold as she requires psychiatric placement due to persistent suicide attempts.  I am seeing the suicidal because she straighten out the safety pin as it is more likely to cause harm to her.  She was discharged from ED 2 months ago by a psychiatrist, support for the decision was based off of her extensive outpatient resources, however those have clearly failed her as this is her third foreign body ingestion since that time.        I have reviewed the nursing notes.    I have reviewed the findings, diagnosis, plan and need for follow up with the patient.    Discharge Medication List as of 12/1/2020  9:59 AM          Final diagnoses:   Suicide gesture, subsequent encounter (H)   I, Maximus Osborn, am serving as a trained medical scribe to document services personally performed by Antelmo Cuevas DO, based on the provider's statements to me.      IAntelmo DO, was physically present and have reviewed and verified the accuracy of this note documented by Maximus Osborn.     11/27/2020   [facility] EMERGENCY DEPARTMENT     Josue Cuevas MD  11/27/20 1912       Josue Cuevas MD  12/04/20 1229       Josue Cuevas MD  02/23/21 4204

## 2020-11-28 NOTE — ED NOTES
Patient asked me to write down the topics that she has for the psychiatrist in the morning:   (These topics are directly quoted from the patient.)    1. About potential eating disorder therapy referral due to swallowing  2. Possible medications that may help with the urge to swallow things  3. Referral to outpatient intensive therapy groups  4. Ask about other therapies that may help with swallowing disorders (specialists in this area)

## 2020-11-28 NOTE — PHARMACY-ADMISSION MEDICATION HISTORY
Admission Medication History Completed by Pharmacy    See [facility] Admission Navigator for allergy information, preferred outpatient pharmacy, prior to admission medications and immunization status.     Medication History Sources:   - Sure script, chart review, unable to talk to the patient     Changes made to PTA medication list (reason):  Added:   - Senna 8.6 mg   - norethindrone 0.35 mg  - hydroxyzine 25 mg  - famotidine 20 mg  Deleted:   - none   Changed:   - none   Additional Information:  - all medications were updated according to the sure script, and was unable to confirm with the patient.     Prior to Admission medications    Medication Sig Last Dose Taking? Auth Provider   acetaminophen (TYLENOL) 500 MG tablet Take 500-1,000 mg by mouth every 8 hours as needed for mild pain  Yes Unknown, Entered By History   albuterol (PROAIR HFA/PROVENTIL HFA/VENTOLIN HFA) 108 (90 Base) MCG/ACT inhaler Inhale 2 puffs into the lungs as needed for shortness of breath / dyspnea or wheezing  Yes Reported, Patient   busPIRone (BUSPAR) 15 MG tablet Take 1 tablet (15 mg) by mouth 2 times daily  Yes Her, Julieth KAT   Cholecalciferol (VITAMIN D) 50 MCG (2000 UT) CAPS Take 2,000 Units by mouth daily   Yes Unknown, Entered By History   cloNIDine (CATAPRES) 0.1 MG tablet Take 0.1 mg by mouth 2 times daily   Yes Reported, Patient   desvenlafaxine (PRISTIQ) 100 MG 24 hr tablet Take 1 tablet (100 mg) by mouth every morning  Yes Reported, Patient   famotidine (PEPCID) 20 MG tablet Take 20 mg by mouth 2 times daily  Yes Unknown, Entered By History   hydroxychloroquine (PLAQUENIL) 200 MG tablet Take 200 mg by mouth 2 times daily  Yes Reported, Patient   hydrOXYzine (ATARAX) 25 MG tablet Take 25-50 mg by mouth 2 times daily as needed for anxiety  Yes Unknown, Entered By History   lurasidone (LATUDA) 60 MG TABS tablet Take 60 mg by mouth daily (with dinner)   Yes Reported, Patient   metFORMIN (GLUCOPHAGE-XR) 500 MG 24 hr tablet Take 1,000 mg by  mouth daily (with dinner)  Yes Unknown, Entered By History   Norethindrone 0.35 mg tablet Take 1 tablet by mouth daily  Yes Unknown, Entered By History   pregabalin (LYRICA) 50 MG capsule Take 50 mg by mouth 3 times daily  Yes Reported, Patient   sennosides (SENOKOT) 8.6 MG tablet Take 1 tablet by mouth 2 times daily   Yes Unknown, Entered By History   valACYclovir (VALTREX) 1000 mg tablet Take 1,000 mg by mouth Take 2 tablets by mouth two times daily for one day. Use as needed at onset of cold sore.  Yes Unknown, Entered By History       Date completed: 11/28/20    Medication history completed by: NATHANIEL UGARTE ( PD3)

## 2020-11-28 NOTE — ED NOTES
[facility] getting last set of vitals, [facility]

## 2020-11-28 NOTE — ANESTHESIA CARE TRANSFER NOTE
Patient: Nevin Alvarado    Procedure(s):  ESOPHAGOGASTRODUODENOSCOPY (EGD)    Diagnosis: * No pre-op diagnosis entered *  Diagnosis Additional Information: No value filed.    Anesthesia Type:   General     Note:  Airway :Nasal Cannula  Patient transferred to:PACU  Comments: Pt. Pink and breathing spontaneously.  Vitals stable.  Noted left upper lip swollen  - no cut -  no bleeding.  Lubrication applied to swollen area. Report given to oncoming nurse.  Transfer of care occurred. Handoff Report: Identifed the Patient, Identified the Reponsible Provider, Reviewed the pertinent medical history, Discussed the surgical course, Reviewed Intra-OP anesthesia mangement and issues during anesthesia, Set expectations for post-procedure period and Allowed opportunity for questions and acknowledgement of understanding      Vitals: (Last set prior to Anesthesia Care Transfer)    CRNA VITALS  11/28/2020 0928 - 11/28/2020 1005      11/28/2020             Pulse:  108    SpO2:  96 %                Electronically Signed By: HU Sanchez CRNA  November 28, 2020  10:05 AM

## 2020-11-29 ENCOUNTER — HOSPITAL ENCOUNTER (EMERGENCY)
Age: 30
End: 2020-11-29
Payer: COMMERCIAL

## 2020-11-29 LAB — UPPER GI ENDOSCOPY: NORMAL

## 2020-11-29 PROCEDURE — 250N000013 HC RX MED GY IP 250 OP 250 PS 637: Performed by: EMERGENCY MEDICINE

## 2020-11-29 PROCEDURE — 250N000013 HC RX MED GY IP 250 OP 250 PS 637: Performed by: FAMILY MEDICINE

## 2020-11-29 PROCEDURE — 124N000002 HC R&B MH UMMC

## 2020-11-29 RX ORDER — CLONIDINE HYDROCHLORIDE 0.1 MG/1
0.1 TABLET ORAL 2 TIMES DAILY
Status: DISCONTINUED | OUTPATIENT
Start: 2020-11-29 | End: 2020-12-01 | Stop reason: HOSPADM

## 2020-11-29 RX ORDER — PREGABALIN 50 MG/1
50 CAPSULE ORAL 3 TIMES DAILY
Status: DISCONTINUED | OUTPATIENT
Start: 2020-11-29 | End: 2020-12-01 | Stop reason: HOSPADM

## 2020-11-29 RX ORDER — HYDROXYZINE HYDROCHLORIDE 25 MG/1
25 TABLET, FILM COATED ORAL EVERY 4 HOURS PRN
Status: DISCONTINUED | OUTPATIENT
Start: 2020-11-29 | End: 2020-12-01 | Stop reason: HOSPADM

## 2020-11-29 RX ORDER — AMOXICILLIN 250 MG
1 CAPSULE ORAL 2 TIMES DAILY PRN
Status: DISCONTINUED | OUTPATIENT
Start: 2020-11-29 | End: 2020-12-01 | Stop reason: HOSPADM

## 2020-11-29 RX ORDER — VITAMIN B COMPLEX
50 TABLET ORAL DAILY
Status: DISCONTINUED | OUTPATIENT
Start: 2020-11-30 | End: 2020-12-01 | Stop reason: HOSPADM

## 2020-11-29 RX ORDER — ACETAMINOPHEN 500 MG
500-1000 TABLET ORAL EVERY 8 HOURS PRN
Status: DISCONTINUED | OUTPATIENT
Start: 2020-11-29 | End: 2020-12-01 | Stop reason: HOSPADM

## 2020-11-29 RX ORDER — TRAZODONE HYDROCHLORIDE 50 MG/1
50 TABLET, FILM COATED ORAL
Status: DISCONTINUED | OUTPATIENT
Start: 2020-11-29 | End: 2020-12-01 | Stop reason: HOSPADM

## 2020-11-29 RX ORDER — DESVENLAFAXINE 50 MG/1
100 TABLET, FILM COATED, EXTENDED RELEASE ORAL DAILY
Status: DISCONTINUED | OUTPATIENT
Start: 2020-11-29 | End: 2020-11-29

## 2020-11-29 RX ORDER — OLANZAPINE 10 MG/2ML
5-10 INJECTION, POWDER, FOR SOLUTION INTRAMUSCULAR 3 TIMES DAILY PRN
Status: DISCONTINUED | OUTPATIENT
Start: 2020-11-29 | End: 2020-12-01 | Stop reason: HOSPADM

## 2020-11-29 RX ORDER — MAGNESIUM HYDROXIDE/ALUMINUM HYDROXICE/SIMETHICONE 120; 1200; 1200 MG/30ML; MG/30ML; MG/30ML
30 SUSPENSION ORAL EVERY 4 HOURS PRN
Status: DISCONTINUED | OUTPATIENT
Start: 2020-11-29 | End: 2020-12-01 | Stop reason: HOSPADM

## 2020-11-29 RX ORDER — HYDROXYCHLOROQUINE SULFATE 200 MG/1
200 TABLET, FILM COATED ORAL 2 TIMES DAILY
Status: DISCONTINUED | OUTPATIENT
Start: 2020-11-29 | End: 2020-12-01 | Stop reason: HOSPADM

## 2020-11-29 RX ORDER — OLANZAPINE 5 MG/1
5-10 TABLET ORAL 3 TIMES DAILY PRN
Status: DISCONTINUED | OUTPATIENT
Start: 2020-11-29 | End: 2020-12-01 | Stop reason: HOSPADM

## 2020-11-29 RX ORDER — HYDROXYZINE HYDROCHLORIDE 50 MG/1
50 TABLET, FILM COATED ORAL EVERY 6 HOURS PRN
Status: DISCONTINUED | OUTPATIENT
Start: 2020-11-29 | End: 2020-11-29

## 2020-11-29 RX ORDER — METFORMIN HCL 500 MG
1000 TABLET, EXTENDED RELEASE 24 HR ORAL
Status: DISCONTINUED | OUTPATIENT
Start: 2020-11-23 | End: 2020-12-01 | Stop reason: HOSPADM

## 2020-11-29 RX ADMIN — DESVENLAFAXINE SUCCINATE 100 MG: 50 TABLET, EXTENDED RELEASE ORAL at 08:53

## 2020-11-29 RX ADMIN — HYDROXYZINE HYDROCHLORIDE 50 MG: 50 TABLET, FILM COATED ORAL at 10:22

## 2020-11-29 RX ADMIN — CLONIDINE HYDROCHLORIDE 0.1 MG: 0.1 TABLET ORAL at 08:34

## 2020-11-29 RX ADMIN — PREGABALIN 50 MG: 50 CAPSULE ORAL at 08:53

## 2020-11-29 RX ADMIN — Medication 2000 UNITS: at 08:34

## 2020-11-29 RX ADMIN — PREGABALIN 50 MG: 50 CAPSULE ORAL at 15:02

## 2020-11-29 RX ADMIN — HYDROXYCHLOROQUINE SULFATE 200 MG: 200 TABLET, FILM COATED ORAL at 08:34

## 2020-11-29 RX ADMIN — METFORMIN HYDROCHLORIDE 1000 MG: 500 TABLET, EXTENDED RELEASE ORAL at 18:38

## 2020-11-29 RX ADMIN — BUSPIRONE HYDROCHLORIDE 15 MG: 15 TABLET ORAL at 08:34

## 2020-11-29 ASSESSMENT — ACTIVITIES OF DAILY LIVING (ADL)
LAUNDRY: WITH SUPERVISION
ORAL_HYGIENE: INDEPENDENT
DRESS: SCRUBS (BEHAVIORAL HEALTH);INDEPENDENT
HYGIENE/GROOMING: INDEPENDENT

## 2020-11-29 NOTE — ED NOTES
Patient is currently a boarder in the ED.   Patient was offered hygiene supplies:The patient is eating at this time. Hygiene supplies will be offered when the patient finishes eating.   Patient was offered to ambulate: Patient is lunch at this time.   Patient was ordered a breakfast tray: Yes, the patient was given their lunch tray.

## 2020-11-29 NOTE — PROGRESS NOTES
Pt requested to be seen as she is on a 72 hour hold and waiting for a bed in the ED.  She reported that she is confused about the hold and has a lot of stuff on her mind.  She feels like people don't listen to her and she isn't getting the help she needs.  Pt reported that she has an appointment with her therapist and hopes to be out of the hospital for this appointment on 12/2/20.  She was tearful throughout our discussion.  Supportive counseling was provided.

## 2020-11-29 NOTE — TELEPHONE ENCOUNTER
Patient cleared and ready for behavioral bed placement: No     S: 31 y/o female in the [facility] ED presenting after swallowing a foreign body.    B: Hx bipolar d/o, ADD, PTSD, SIB, pica.  Pt reported she swallowed a straightened paperclip prior to being brought in to the ED but denied the intent was to self-harm.  Per HPI, pt was also seen on 11/17/20 with a similar presentation.  3rd foreign body ingestion since last [facility] admission (20 in September) and 12 ED presentations for this issue this year.  Per DEC Assessment, pt ws admitted to [facility] from 10/7-10/9 after she swallowed 36 diphenhydramine and 2 mickie pins.  OP resources include a , ILS worker, CADI , therapist, psychiatrist and DBT group.  No reported HI or psychosis.    A: 72 hr hold.  Medical clearance pending.  Pending endoscopy to remove foreign body.  Estimated to be medically cleared after 9 AM  See Emergency Care Plan    R: Awaiting medical clearance.    R: Pt is medically cleared. COVID test is negative. [facility] on call approved admission to [facility] at 1815. Unit notified at 1825. ED notified at 1828.  Patient cleared and ready for behavioral bed placement: Yes    R: [facility] on call called at 1940, upon chart review pt is declined for admission due to pt acuity and need for CPAP. [facility]mission to [facility] cancelled. Identifying other placement options. Pt remains on work list. ED notified at 1955.    1957: [facility] reports they are at capacity.   2001: [facility] reports that [facility] and [facility] are at capacity and pt does not meet criteria for [place].   [facility]: [facility] [place] [facility] due to pt's hx of swallowing foreign objects.   2200: Dr. Victor declined [facility] due to pt acuity, pt would need placement on [facility] which is not available at this time.

## 2020-11-29 NOTE — ED NOTES
Patient care was signed out to me pending psychiatric placement.  No issues during my shift.  She will be signed out to the day physician.     Efraín Diego,   11/29/20 0605

## 2020-11-29 NOTE — ED NOTES
Sign out note: Nevin Alvarado is a 30 year old female was signed out to me by Dr. Diego at 0700 am.  Please see initial dictation for full details and history.  Patient is well-known to the emergency department for repeated foreign body ingestions that require endoscopy. She was seen at the [facility] on 11/27/2020 and an EGD was done to remove an 8 cm metal yang. She was then transferred to [facility] emergency department awaiting an inpatient psychiatric bed. She is on a 72-hour hold. Plan at time of sign out is admit the patient to psychiatric bed..       Course in the ED:  I spoke with her at about 0830 am. She is quite upset about her wait in the ED waiting for a bed.  I spoke with mental health intake regarding the patient.  Apparently, she has been refused at [facility] as well as [facility] due to her high acuity and need for one-to-one sitter.  There are no beds here at [facility].  The patient is on a 72-hour hold.  Unfortunately, we are working on obtaining an available bed but have not yet been successful.  The patient does require a one-to-one given her history of swallowing foreign bodies.  I treated her with Prestiq medication.--she is on 100 mg daily.  She also received Atarax for anxiety.  She had an uneventful day shift.  She did speak with a [facility]  during the day at her request.  She was signed out to the evening physician still awaiting psychiatric bed availability.      This note was created in part by the use of Dragon voice recognition dictation system. Inadvertent grammatical errors and typographical errors may still exist.  MD Dotty Carlos Alda L, MD  11/29/20 1982

## 2020-11-29 NOTE — ED NOTES
Emergency Department Patient Sign-out       Brief HPI:  This is a 30 year old female signed out to me by Dr. Storm.  See initial ED Provider note for details of the presentation.       Significant Events prior to my assuming care: Pending admission with likely UGI endoscopy in the morning.        Exam:   Patient Vitals for the past 24 hrs:   BP Temp Temp src Pulse Resp SpO2   11/29/20 0834 (!) 151/92 98.7  F (37.1  C) Oral 101 18 98 %   11/28/20 1923 (!) 151/94 98.3  F (36.8  C) -- 131 -- 95 %           ED RESULTS:   Results for orders placed or performed during the hospital encounter of 11/27/20 (from the past 24 hour(s))   Drug abuse screen 6 urine (tox)     Status: None    Collection Time: 11/28/20  7:38 PM   Result Value Ref Range    Amphetamine Qual Urine Negative NEG^Negative    Barbiturates Qual Urine Negative NEG^Negative    Benzodiazepine Qual Urine Negative NEG^Negative    Cannabinoids Qual Urine Negative NEG^Negative    Cocaine Qual Urine Negative NEG^Negative    Ethanol Qual Urine Negative NEG^Negative    Opiates Qualitative Urine Negative NEG^Negative       ED MEDICATIONS:   Medications   naloxone (NARCAN) injection 0.2 mg (has no administration in time range)   naloxone (NARCAN) injection 0.4 mg (has no administration in time range)   naloxone (NARCAN) injection 0.2 mg (has no administration in time range)   naloxone (NARCAN) injection 0.4 mg (has no administration in time range)   albuterol (PROAIR HFA/PROVENTIL HFA/VENTOLIN HFA) 108 (90 Base) MCG/ACT inhaler 2 puff (2 puffs Inhalation Not Given 11/29/20 1329)   Vitamin D3 (CHOLECALCIFEROL) tablet 2,000 Units (2,000 Units Oral Given 11/29/20 0834)   cloNIDine (CATAPRES) tablet 0.1 mg (0.1 mg Oral Given 11/29/20 0834)   lurasidone (LATUDA) tablet 60 mg (60 mg Oral Given 11/28/20 1947)   metFORMIN (GLUCOPHAGE-XR) 24 hr tablet 1,000 mg (1,000 mg Oral Given 11/28/20 1715)   pregabalin (LYRICA) capsule 50 mg (50 mg Oral Given 11/29/20 1502)    busPIRone (BUSPAR) tablet 15 mg (15 mg Oral Given 11/29/20 0834)   sennosides (SENOKOT) tablet 8.6 mg (has no administration in time range)   heparin 100 UNIT/ML injection 5 mL (5 mLs Intracatheter Given 11/28/20 2024)   hydroxychloroquine (PLAQUENIL) tablet 200 mg (200 mg Oral Given 11/29/20 0834)   desvenlafaxine (PRISTIQ) 24 hr tablet 100 mg (100 mg Oral Given 11/29/20 0853)   hydrOXYzine (ATARAX) tablet 50 mg (50 mg Oral Given 11/29/20 1022)   ketorolac (TORADOL) injection 15 mg (15 mg Intravenous Given 11/27/20 2146)   ketorolac (TORADOL) injection 15 mg (15 mg Intravenous Given 11/28/20 0037)   fentaNYL (PF) (SUBLIMAZE) injection 25 mcg (25 mcg Intravenous Given 11/28/20 0227)   haloperidol lactate (HALDOL) injection 2 mg (2 mg Intravenous Given 11/28/20 0425)   acetaminophen (TYLENOL) tablet 650 mg (650 mg Oral Given 11/28/20 1039)   desvenlafaxine (PRISTIQ) 24 hr tablet 100 mg (100 mg Oral Given 11/28/20 1424)   ondansetron (ZOFRAN-ODT) ODT tab 8 mg (8 mg Oral Given 11/28/20 1323)   acetaminophen (TYLENOL) tablet 1,000 mg (1,000 mg Oral Given 11/28/20 1451)         Impression:    ICD-10-CM    1. Swallowed foreign body, initial encounter  T18.9XXA Drug abuse screen 6 urine (tox)     Case Request: ESOPHAGOGASTRODUODENOSCOPY (EGD)     Case Request: ESOPHAGOGASTRODUODENOSCOPY (EGD)     Glucose by meter     Glucose by meter   2. Suicide gesture, subsequent encounter (H)  X83.8XXD CBC with platelets differential     Basic metabolic panel     HCG qualitative Blood     Acetaminophen level     Salicylate level     Asymptomatic COVID-19 Virus (Coronavirus) by PCR     SARS-CoV-2 COVID-19 Virus (Coronavirus) RT-PCR     SARS-CoV-2 COVID-19 Virus (Coronavirus) RT-PCR Nasopharyngeal       Plan:    Pending admission to medicine with GI consultation due to ingested foreign body.      Reevaluation:  No interval deterioration.  Admitted to inpatient medicine.        MD Yayo Joya William A, MD  11/29/20  1930

## 2020-11-29 NOTE — ED NOTES
Emergency Department Patient Sign-out       Brief HPI:  This is a 30 year old female signed out to me by Dr. Garcia .  See initial ED Provider note for details of the presentation.          Patient is medically cleared for admission to a Behavioral Health unit.      The patient is on a hold.  The type of hold is a 72 hour hold for psychiatric reasons.      The patient has not required medication for agitation aside from her home medications.    Awaiting Transfer to Mental Health Facility      Significant Events prior to my assuming care: No significant events. EGD this morning to remove metallic foreign body from stomach. Waiting for inpatient behavioral health.      Exam:   Patient Vitals for the past 24 hrs:   BP Temp Temp src Pulse Resp SpO2   11/28/20 1923 (!) 151/94 98.3  F (36.8  C) -- 131 -- 95 %   11/28/20 1206 (!) 169/105 -- -- 113 -- 97 %   11/28/20 1100 (!) 141/109 98.6  F (37  C) Oral 101 16 97 %   11/28/20 1045 (!) 147/97 98.1  F (36.7  C) Oral 100 15 97 %   11/28/20 1030 (!) 149/93 98  F (36.7  C) -- 99 13 99 %   11/28/20 1015 128/86 -- -- 100 18 99 %   11/28/20 1000 134/89 98.1  F (36.7  C) Oral 104 15 98 %   11/28/20 0838 117/72 -- -- 94 -- 98 %   11/28/20 0800 -- -- -- -- -- 96 %   11/28/20 0700 -- -- -- -- -- 99 %   11/28/20 0600 -- -- -- -- -- 99 %   11/28/20 0500 -- -- -- -- -- 98 %   11/28/20 0428 133/79 -- -- -- -- --   11/28/20 0400 -- -- -- -- -- 100 %           ED RESULTS:   Results for orders placed or performed during the hospital encounter of 11/27/20 (from the past 24 hour(s))   Glucose by meter     Status: Abnormal    Collection Time: 11/28/20  9:00 AM   Result Value Ref Range    Glucose 125 (H) 70 - 99 mg/dL   Drug abuse screen 6 urine (tox)     Status: None    Collection Time: 11/28/20  7:38 PM   Result Value Ref Range    Amphetamine Qual Urine Negative NEG^Negative    Barbiturates Qual Urine Negative NEG^Negative    Benzodiazepine Qual Urine Negative NEG^Negative     Cannabinoids Qual Urine Negative NEG^Negative    Cocaine Qual Urine Negative NEG^Negative    Ethanol Qual Urine Negative NEG^Negative    Opiates Qualitative Urine Negative NEG^Negative       ED MEDICATIONS:   Medications   naloxone (NARCAN) injection 0.2 mg (has no administration in time range)   naloxone (NARCAN) injection 0.4 mg (has no administration in time range)   naloxone (NARCAN) injection 0.2 mg (has no administration in time range)   naloxone (NARCAN) injection 0.4 mg (has no administration in time range)   albuterol (PROAIR HFA/PROVENTIL HFA/VENTOLIN HFA) 108 (90 Base) MCG/ACT inhaler 2 puff (2 puffs Inhalation Not Given 11/28/20 2255)   Vitamin D3 (CHOLECALCIFEROL) tablet 2,000 Units (2,000 Units Oral Given 11/28/20 1421)   cloNIDine (CATAPRES) tablet 0.1 mg (0.1 mg Oral Given 11/28/20 1953)   lurasidone (LATUDA) tablet 60 mg (60 mg Oral Given 11/28/20 1947)   metFORMIN (GLUCOPHAGE-XR) 24 hr tablet 1,000 mg (1,000 mg Oral Given 11/28/20 1715)   pregabalin (LYRICA) capsule 50 mg (50 mg Oral Not Given 11/28/20 2253)   busPIRone (BUSPAR) tablet 15 mg (15 mg Oral Given 11/28/20 1949)   sennosides (SENOKOT) tablet 8.6 mg (has no administration in time range)   heparin 100 UNIT/ML injection 5 mL (5 mLs Intracatheter Given 11/28/20 2024)   hydroxychloroquine (PLAQUENIL) tablet 200 mg (200 mg Oral Not Given 11/28/20 2253)   ketorolac (TORADOL) injection 15 mg (15 mg Intravenous Given 11/27/20 2146)   ketorolac (TORADOL) injection 15 mg (15 mg Intravenous Given 11/28/20 0037)   fentaNYL (PF) (SUBLIMAZE) injection 25 mcg (25 mcg Intravenous Given 11/28/20 0227)   haloperidol lactate (HALDOL) injection 2 mg (2 mg Intravenous Given 11/28/20 0425)   acetaminophen (TYLENOL) tablet 650 mg (650 mg Oral Given 11/28/20 1039)   desvenlafaxine (PRISTIQ) 24 hr tablet 100 mg (100 mg Oral Given 11/28/20 1424)   ondansetron (ZOFRAN-ODT) ODT tab 8 mg (8 mg Oral Given 11/28/20 1323)   acetaminophen (TYLENOL) tablet 1,000 mg  (1,000 mg Oral Given 11/28/20 1451)         Impression:    ICD-10-CM    1. Swallowed foreign body, initial encounter  T18.9XXA Drug abuse screen 6 urine (tox)     Case Request: ESOPHAGOGASTRODUODENOSCOPY (EGD)     Case Request: ESOPHAGOGASTRODUODENOSCOPY (EGD)     Glucose by meter     Glucose by meter   2. Suicide gesture, subsequent encounter (H)  X83.8XXD CBC with platelets differential     Basic metabolic panel     HCG qualitative Blood     Acetaminophen level     Salicylate level     Asymptomatic COVID-19 Virus (Coronavirus) by PCR     SARS-CoV-2 COVID-19 Virus (Coronavirus) RT-PCR     SARS-CoV-2 COVID-19 Virus (Coronavirus) RT-PCR Nasopharyngeal       Plan:    Continues to wait for mental health inpatient bed. Legal guardian updated. Will continue to hold in ED until inpatient bed is available.       DO Loyda Mendoza Amanda, DO  11/29/20 0012

## 2020-11-29 NOTE — TELEPHONE ENCOUNTER
0329 Bed Search Update:    [facility]-No beds available  [facility]-No beds available  [facility]-No beds available  [facility]-No beds available  [facility]-At capacity (reported at 1957)  [facility]-No beds available  [facility]-Negative COVID required. Low acuity only  [facility]-No beds available  [facility]-Negative COVID required. Low acuity only  [facility]-No beds available  [facility]-No beds available  [facility]-Negative COVID required  [facility]-No beds available  [facility]-No beds available  [facility]-No beds available  [facility]-Negative COVID required. Low acuity only  [facility]-Negative COVID required  [facility]-Negative COVID required. Voluntary pts only  [facility]-No beds available  [facility]-No beds available  [facility]-Negative COVID required  [facility]-No beds available  [facility]-No beds available  [facility]-No beds available  [facility]-Negative COVID required. Voluntary pts only  [facility]-No beds available  [facility]-Declined on eves

## 2020-11-29 NOTE — ED NOTES
Pt frustrated that she has to wait in the ER. Offered pt other things to entertain; pt was not interested. Otherwise cooperative with cares. 1:1 in place.

## 2020-11-30 ENCOUNTER — AMBULATORY - HEALTHEAST (OUTPATIENT)
Dept: OTHER | Facility: CLINIC | Age: 30
End: 2020-11-30

## 2020-11-30 LAB
CHOLEST SERPL-MCNC: 130 MG/DL
GLUCOSE BLDC GLUCOMTR-MCNC: 117 MG/DL (ref 70–99)
HDLC SERPL-MCNC: 44 MG/DL
LDLC SERPL CALC-MCNC: 50 MG/DL
NONHDLC SERPL-MCNC: 86 MG/DL
TRIGL SERPL-MCNC: 182 MG/DL
TSH SERPL DL<=0.005 MIU/L-ACNC: 3.19 MU/L (ref 0.4–4)

## 2020-11-30 PROCEDURE — 124N000002 HC R&B MH UMMC

## 2020-11-30 PROCEDURE — 84443 ASSAY THYROID STIM HORMONE: CPT | Performed by: CLINICAL NURSE SPECIALIST

## 2020-11-30 PROCEDURE — 250N000013 HC RX MED GY IP 250 OP 250 PS 637: Performed by: NURSE PRACTITIONER

## 2020-11-30 PROCEDURE — 999N001017 HC STATISTIC GLUCOSE BY METER IP

## 2020-11-30 PROCEDURE — 99223 1ST HOSP IP/OBS HIGH 75: CPT | Mod: AI | Performed by: NURSE PRACTITIONER

## 2020-11-30 PROCEDURE — 36416 COLLJ CAPILLARY BLOOD SPEC: CPT | Performed by: CLINICAL NURSE SPECIALIST

## 2020-11-30 PROCEDURE — 250N000013 HC RX MED GY IP 250 OP 250 PS 637: Performed by: CLINICAL NURSE SPECIALIST

## 2020-11-30 PROCEDURE — 80061 LIPID PANEL: CPT | Performed by: CLINICAL NURSE SPECIALIST

## 2020-11-30 PROCEDURE — 250N000011 HC RX IP 250 OP 636: Performed by: PSYCHIATRY & NEUROLOGY

## 2020-11-30 RX ORDER — ONDANSETRON 4 MG/1
4 TABLET, FILM COATED ORAL EVERY 6 HOURS PRN
Status: DISCONTINUED | OUTPATIENT
Start: 2020-11-30 | End: 2020-12-01 | Stop reason: HOSPADM

## 2020-11-30 RX ORDER — DESVENLAFAXINE 50 MG/1
100 TABLET, FILM COATED, EXTENDED RELEASE ORAL DAILY
Status: DISCONTINUED | OUTPATIENT
Start: 2020-11-30 | End: 2020-12-01 | Stop reason: HOSPADM

## 2020-11-30 RX ADMIN — HYDROXYCHLOROQUINE SULFATE 200 MG: 200 TABLET, FILM COATED ORAL at 19:12

## 2020-11-30 RX ADMIN — CLONIDINE HYDROCHLORIDE 0.1 MG: 0.1 TABLET ORAL at 19:12

## 2020-11-30 RX ADMIN — BUSPIRONE HYDROCHLORIDE 15 MG: 10 TABLET ORAL at 10:05

## 2020-11-30 RX ADMIN — Medication 50 MCG: at 10:05

## 2020-11-30 RX ADMIN — METFORMIN HYDROCHLORIDE 1000 MG: 500 TABLET, EXTENDED RELEASE ORAL at 17:24

## 2020-11-30 RX ADMIN — CLONIDINE HYDROCHLORIDE 0.1 MG: 0.1 TABLET ORAL at 10:05

## 2020-11-30 RX ADMIN — PREGABALIN 50 MG: 50 CAPSULE ORAL at 10:05

## 2020-11-30 RX ADMIN — HYDROXYCHLOROQUINE SULFATE 200 MG: 200 TABLET, FILM COATED ORAL at 10:06

## 2020-11-30 RX ADMIN — PREGABALIN 50 MG: 50 CAPSULE ORAL at 19:12

## 2020-11-30 RX ADMIN — BUSPIRONE HYDROCHLORIDE 15 MG: 10 TABLET ORAL at 19:12

## 2020-11-30 RX ADMIN — DESVENLAFAXINE SUCCINATE 100 MG: 50 TABLET, EXTENDED RELEASE ORAL at 13:06

## 2020-11-30 RX ADMIN — LURASIDONE HYDROCHLORIDE 60 MG: 40 TABLET, FILM COATED ORAL at 19:12

## 2020-11-30 RX ADMIN — PREGABALIN 50 MG: 50 CAPSULE ORAL at 14:30

## 2020-11-30 RX ADMIN — ONDANSETRON HYDROCHLORIDE 4 MG: 4 TABLET, FILM COATED ORAL at 19:13

## 2020-11-30 NOTE — H&P
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  steady gait with walker    /71   Pulse 113   Temp 97.4  F (36.3  C) (Oral)   Resp 16   SpO2 95%   Breastfeeding No            Physical Exam:     Please refer to the physical exam completed by Dr. Cuevas in the ER 11/27/2020.  "

## 2020-11-30 NOTE — PLAN OF CARE
"Admission Note    Pt History  Pt is a 29 y/o who presented to [place] ED after swallowing a foreign body. Pt ingested a paperclip which was later removed. Pt has a history of bipolar disorder, ADD, PTSD, SIB, depression and anxiety. Pt has had 12 presentations to the ED within the year.     Pt Assessment  Pt presented with a flat, blunted affect. Pt denies SI/HI/AH/VH. Pt endorsed SIB via swallowing, but stated \"I am not going to try and do that while I'm here.\" Pt ambulates with the assistance of a walker. Pt dresses independently, but verbalized needing assistance from staff to get socks on. Pt verbalized needing a shower chair to complete shower. Pt uses CPAP as night, CPAP approved and order placed. When asked about any skin concerns, pt stated \"I have some scars on my arms from SIB. I have some discoloration on my legs from my neuropathy. That has been like that for awhile.\" Pt verbalized having NO concerns regarding their skin at this time. Staff assessed pt arms and legs, no concerns at this time - will continue to monitor. Per pt Metformin, blood glucose checks 1x daily were ordered.     2:1 SIO  Pt placed on a 2:1 SIO to promote pt safety per pt history of foreign object ingestion. 2:1 are to stay within 1 foot of pt at all times. Staff are to monitor pt closely and monitor objects around pt at all times to promote pt safety. See SIO order for more information.     Status: 72 hour hold  Start: 11/27 @ 1944  End: 12/2 @ 1944    Precautions: Fall, SIB, Single room    Provider notified of pt admission, orders placed. Pt is resting comfortably in room at this time, no further complaints/concerns, will continue to monitor.       "

## 2020-11-30 NOTE — PROGRESS NOTES
[facility] set up conference call with the following people:   Janie: Chandleran: [phone]  Hollie: TIMOTHY worker: [phone]  : Clara: [phone]  : Cheryl: [phone]

## 2020-11-30 NOTE — PROGRESS NOTES
"Writer attempted to administer medications once received from pharmacy. Patient refused vitals and medications stating \"it's too late\".   "

## 2020-11-30 NOTE — PLAN OF CARE
BEHAVIORAL TEAM DISCUSSION    Participants: Toya Beltran.CNP; Maranda Ribeiro and Radha Jung, CTC's; Flora Helm RN; Fabrizio Gonzalez OT  Progress: New admission for this 30 year old female admitted to [facility] 11/30/2020 after intentional ingestion of a paperclip.  Hx of mental health admissions and multiple ED presentations due to mental health.   Anticipated length of stay: TBD  Continued Stay Criteria/Rationale: pt needs further assessment  Medical/Physical: foreign body was removed prior to admission  Precautions:   Behavioral Orders   Procedures     Code 1 - Restrict to Unit     Fall precautions     Routine Programming     As clinically indicated     Self Injury Precaution     Single Room     2:1 observation     Status 15     Every 15 minutes.     Status Individual Observation     Patient SIO status reviewed with team/RN.  Please also refer to RN/team documentation for add'l detail.    -SIO staff to monitor following which have contributed to patient being on SIO:  Hx of ingesting foreign objects.     -Possible interventions SIO staff could use to support patient's treatment progress:  Monitor closely for loose objects in milieu . No objects in room. Finger foods only. No utensils on meal tray. Meals in room only. No groups until approved by provider. Scrubs only until clothing approved by provider. Toiletries kept behind desk or in med room. Establish therapeutic relationship.     -When following observed, team will review discontinuation of SIO:  To be reviewed by provider daily.     Order Specific Question:   CONTINUOUS 24 hours / day     Answer:   Other     Order Specific Question:   Specify distance     Answer:   5 feet.  Female staff.     Order Specific Question:   Indications for SIO     Answer:   Self-injury risk     Suicide precautions     Patients on Suicide Precautions should have a Combination Diet ordered that includes a Diet selection(s) AND a Behavioral Tray selection for Safe  Tray - with utensils, or Safe Tray - NO utensils       Plan: assess, monitor and stabilize.    Rationale for change in precautions or plan: new admission
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
## 2020-11-30 NOTE — PROGRESS NOTES
Work Completed:  Reviewed chart  Remotely attended team discussion.  Entered team note.  Called pt on the unit to interview  Called pt's ILS worker to coordinate care.  AVS work.      Discharge plan or goal: stabilize and discharge                Barriers to discharge: coordination of op services and need for additional psychiatric assessment, collaboration with Legal guardian regarding plan.

## 2020-11-30 NOTE — PLAN OF CARE
Problem: Behavior Regulation Impairment (Nonsuicidal Self-Injury)  Goal: Improved Behavior Regulation and Impulse Control (Nonsuicidal Self-Injury)  Outcome: Improving     Problem: Mood Impairment (Nonsuicidal Self-Injury)  Goal: Improved Mood Symptoms (Nonsuicidal Self-Injury)  Outcome: Improving     Problem: Suicidal Behavior  Goal: Suicidal Behavior is Absent or Managed  Outcome: Improving  Patient was assessed at 1400 for suicidal ideation and thoughts of self-harm. Patient denied both of them. She denied wishing she was dead. Will continue to monitor self-harm and suicidal thoughts.

## 2020-11-30 NOTE — PLAN OF CARE
Call from Solange, Mental health  from [place], [phone]. Fax: [phone]    Pt has extensive history of self-harm. Past 6 weeks has done well except one occasion. The self-harm can be cyclical. Got guardian beginning of October, has had a few episodes of self-harm; tends to be high risk episodes.    Have discussed crisis home, IOP programming. [facility]. Had a couple of intakes scheduled but was inpatient and missed one and cancelled the other due to illness. Now has to be re-referred.

## 2020-11-30 NOTE — PROGRESS NOTES
11/30/20 0242   Patient Belongings   Did you bring any home meds/supplements to the hospital?  Yes   Disposition of meds  Sent to security/pharmacy per site process  (Given to RN / Envelope #[id number])   Patient Belongings locker  (Locker #4)   Patient Belongings Remaining with Patient glasses   Patient Belongings Put in Hospital Secure Location (Security or Locker, etc.) cell phone/electronics;clothing;keys;plastic bag;purse/wallet;ring;shoes;wallet   Belongings Search Yes   Clothing Search Yes   Second Staff Pt searched by evening shift staff 11/29/2020 - belongings searched later by night shift staff       Belongings Kept with Patient on Unit:  - Glasses    Patient Belongings Stored in Locker #4:   - Clothing (socks, crocs with 8 Tiffanie charms (Jibbitz) attached, bra, fabric facemask, underwear, T-shirt, leggings, zip-up jacket, headband (earwarmer)).   - Toiletries (hair binder, feminine hygiene products including tampon and pads, Chapstick, hand ).  - Ivonne Sylvester purse  - Miscellaneous receipts/paperwork  - Keys and lanyard  - Ring  - Micro fiber cloth for cleaning glasses  - Deck of cards  - Glasses case  - 2 bags of C-PAP attachments  - Charging cord and box  - Cell phone   - Handicap ID parking placard   - Wallet  - Loose change  - Membership cards  - [place] Drivers License  - [employer] and Medicare Insurance cards    Given to RN / Sent to Security / Envelope #[id number]:   - Albuterol Sulfate HFA Inhalation Aerosol Inhaler    Items Sent to Security / Envelope #[id number]:  - Social Security Card  - [employer] gift card  - [employer] V[employer] debit card (#6204)  - [employer] mastercard (#[id number])  - [employer] To-Go card   - [employer] Visa debit card (#[id number])  - [employer] Visa debit card (#8201)  - [place] EBT card (#[id number])    A               Admission:  I am responsible for any personal items that are not sent to the safe or pharmacy.  [facility] is not responsible for loss, theft or damage of any  property in my possession.    Signature:  _________________________________ Date: _______  Time: _____                                              Staff Signature:  ____________________________ Date: ________  Time: _____      2nd Staff person, if patient is unable/unwilling to sign:    Signature: ________________________________ Date: ________  Time: _____     Discharge:  [place] has returned all of my personal belongings:    Signature: _________________________________ Date: ________  Time: _____                                          Staff Signature:  ____________________________ Date: ________  Time: _____

## 2020-12-01 VITALS
SYSTOLIC BLOOD PRESSURE: 124 MMHG | HEART RATE: 94 BPM | TEMPERATURE: 97.9 F | DIASTOLIC BLOOD PRESSURE: 78 MMHG | RESPIRATION RATE: 16 BRPM | OXYGEN SATURATION: 97 %

## 2020-12-01 PROCEDURE — 250N000013 HC RX MED GY IP 250 OP 250 PS 637: Performed by: CLINICAL NURSE SPECIALIST

## 2020-12-01 PROCEDURE — 94660 CPAP INITIATION&MGMT: CPT

## 2020-12-01 PROCEDURE — 250N000013 HC RX MED GY IP 250 OP 250 PS 637: Performed by: NURSE PRACTITIONER

## 2020-12-01 PROCEDURE — 999N000157 HC STATISTIC RCP TIME EA 10 MIN

## 2020-12-01 PROCEDURE — 99239 HOSP IP/OBS DSCHRG MGMT >30: CPT | Performed by: NURSE PRACTITIONER

## 2020-12-01 RX ADMIN — CLONIDINE HYDROCHLORIDE 0.1 MG: 0.1 TABLET ORAL at 08:01

## 2020-12-01 RX ADMIN — Medication 50 MCG: at 08:02

## 2020-12-01 RX ADMIN — HYDROXYCHLOROQUINE SULFATE 200 MG: 200 TABLET, FILM COATED ORAL at 08:02

## 2020-12-01 RX ADMIN — BUSPIRONE HYDROCHLORIDE 15 MG: 10 TABLET ORAL at 08:01

## 2020-12-01 RX ADMIN — PREGABALIN 50 MG: 50 CAPSULE ORAL at 08:02

## 2020-12-01 RX ADMIN — DESVENLAFAXINE SUCCINATE 100 MG: 50 TABLET, EXTENDED RELEASE ORAL at 08:02

## 2020-12-01 ASSESSMENT — ACTIVITIES OF DAILY LIVING (ADL)
ORAL_HYGIENE: INDEPENDENT
HYGIENE/GROOMING: INDEPENDENT
LAUNDRY: WITH SUPERVISION
DRESS: INDEPENDENT

## 2020-12-01 NOTE — PROGRESS NOTES
Patient was discharged to home today from [facility] [address] [place]. Discharge instructions were reviewed and she verbalized understanding of them. Patient denied having any suicidal thoughts at time of discharge. Patient was did not have medications filled prior to discharge, she said she has them at home. All of patients personal belongings were returned to her at discharge.
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
## 2020-12-01 NOTE — PLAN OF CARE
Patient denied suicidal ideation, depression,anxiety, thought problems and medication side effects.  Affect is flat. No SIB or foreign body ingestion noted or reported.  She reported nausea at 1800 so an order for prn zofran was obtained and she received a dose with  her HS meds.  Patient contracted for safety while using cpap.

## 2020-12-01 NOTE — PLAN OF CARE
Attended 1 OT group. Initiated OT [employer] and was decisive with task selection and efficient with planning and execution. Worked with good attention to details. Minimal social interaction, however, did discuss plans and goals with writer. Appeared OK with discharge today and choices were made with that plan in mind. Encouraged to f/unit(s) with out patient providers and home staff.

## 2020-12-01 NOTE — DISCHARGE INSTRUCTIONS
Behavioral Discharge Planning and Instructions      Summary:  You were admitted on 11/27/2020  due to Self Injurious Behaviors.  You were treated by Toya Beltran NP and discharged on 12/1/2020 from [facility] Home      Principal Diagnosis:   Borderline personality disorder  Impulse control disorder, unspecified  PTSD  History of major depressive disorder  History of ingestion of harmful objects  History of inserting harmful objects into rectum and vagina      Health Care Follow-up Appointments:     You will be picked up today by your ILS worker, Edwige [phone] who will transport you home and spend the day with you.  Please continue with your established providers:    PCP - Dr. Latonya Knight - [facility]    Resource Organizer - Cheryl ([phone])    [facility]  - Clara ([phone])    Psychiatry -  Dr. Brionna De La Rosa appointment scheduled on 12/10/2020 @8am (this is a virtual appointment)  [facility]  Address: [address] [place] [address]  Phone: [phone]  Fax  [phone]        [facility]      Attend all scheduled appointments with your outpatient providers. Call at least 24 hours in advance if you need to reschedule an appointment to ensure continued access to your outpatient providers.   Major Treatments, Procedures and Findings:  You were provided with: a psychiatric assessment, medication evaluation and/or management and milieu management    Symptoms to Report: mood getting worse, thoughts of suicide or urges to self harm    Early warning signs can include: increased depression or anxiety sleep disturbances increased thoughts or behaviors of suicide or self-harm     Safety and Wellness:  Take all medicines as directed.  Make no changes unless your doctor suggests them.      Follow treatment recommendations.  Refrain from alcohol and non-prescribed drugs.  If there is a concern for safety, call  "911.    Resources:   Crisis Intervention: 181.261.3728 or 605-160-8710 (TTY: 622.815.8973).  Call anytime for help.  National [place] [employer] Mental Illness (www.mn.[place]arnell.org): 864.809.8179 or 955-295-6636.  Suicide Awareness Voices of Education (SAVE) (www.save.org): 888-511-SAVE (2125)  [facility] Crisis Response - Adult 831 384-2999  Text 4 Life: txt \"LIFE\" to 21815 for immediate support and crisis intervention      The treatment team has appreciated the opportunity to work with you.     If you have any questions or concerns our unit number is [phone]        "

## 2020-12-01 NOTE — PROGRESS NOTES
Work Completed: consult with treatment team, scheduled OP psychiatry, completed AVS, call to ELDA [email or web address] [phone] to coordinate .    Discharge plan or goal: discharge today to OP providers.  Psychiatry -  Dr. Brionna De La Rosa appointment scheduled on 12/10/2020 @8am (this is a virtual appointment)  [facility]  Address: [facility] [address] [place] [address]  Phone: [phone]  Fax  [phone]                Barriers to discharge: none

## 2020-12-02 ENCOUNTER — TELEPHONE (OUTPATIENT)
Dept: INTERNAL MEDICINE | Facility: CLINIC | Age: 30
End: 2020-12-02

## 2020-12-02 NOTE — TELEPHONE ENCOUNTER
Pt is established with [facility] primary care and various other specialities. Pt has ample outpatient supports including case management, therapy, and psychiatry.     Care Coordination outreach not appropriate as patient is not seen by [facility] PCP.     No discharge call is necessary from [facility] system at this time.

## 2021-01-10 ENCOUNTER — HOSPITAL ENCOUNTER (EMERGENCY)
Facility: CLINIC | Age: 31
Discharge: HOME | End: 2021-01-10
Attending: STUDENT IN AN ORGANIZED HEALTH CARE EDUCATION/TRAINING PROGRAM | Admitting: STUDENT IN AN ORGANIZED HEALTH CARE EDUCATION/TRAINING PROGRAM
Payer: COMMERCIAL

## 2021-01-10 VITALS
WEIGHT: 292 LBS | HEART RATE: 94 BPM | SYSTOLIC BLOOD PRESSURE: 133 MMHG | OXYGEN SATURATION: 99 % | BODY MASS INDEX: 53.73 KG/M2 | HEIGHT: 62 IN | DIASTOLIC BLOOD PRESSURE: 82 MMHG | RESPIRATION RATE: 16 BRPM

## 2021-01-10 DIAGNOSIS — G62.9 NEUROPATHY: ICD-10-CM

## 2021-01-10 LAB — GLUCOSE BLDC GLUCOMTR-MCNC: 109 MG/DL (ref 70–99)

## 2021-01-10 PROCEDURE — 250N000011 HC RX IP 250 OP 636: Performed by: EMERGENCY MEDICINE

## 2021-01-10 PROCEDURE — 99291 CRITICAL CARE FIRST HOUR: CPT | Performed by: STUDENT IN AN ORGANIZED HEALTH CARE EDUCATION/TRAINING PROGRAM

## 2021-01-10 PROCEDURE — 99283 EMERGENCY DEPT VISIT LOW MDM: CPT | Performed by: STUDENT IN AN ORGANIZED HEALTH CARE EDUCATION/TRAINING PROGRAM

## 2021-01-10 PROCEDURE — 250N000013 HC RX MED GY IP 250 OP 250 PS 637: Performed by: STUDENT IN AN ORGANIZED HEALTH CARE EDUCATION/TRAINING PROGRAM

## 2021-01-10 PROCEDURE — 999N001017 HC STATISTIC GLUCOSE BY METER IP

## 2021-01-10 PROCEDURE — 999N000176 HC STATISTIC STROKE CODE W/O ACCESS

## 2021-01-10 RX ORDER — HEPARIN SODIUM (PORCINE) LOCK FLUSH IV SOLN 100 UNIT/ML 100 UNIT/ML
5 SOLUTION INTRAVENOUS
Status: DISCONTINUED | OUTPATIENT
Start: 2021-01-10 | End: 2021-01-10 | Stop reason: HOSPADM

## 2021-01-10 RX ORDER — LIDOCAINE 4 G/G
1 PATCH TOPICAL ONCE
Status: DISCONTINUED | OUTPATIENT
Start: 2021-01-10 | End: 2021-01-10 | Stop reason: HOSPADM

## 2021-01-10 RX ADMIN — LIDOCAINE 1 PATCH: 560 PATCH PERCUTANEOUS; TOPICAL; TRANSDERMAL at 21:00

## 2021-01-10 RX ADMIN — Medication 5 ML: at 21:00

## 2021-01-10 ASSESSMENT — ENCOUNTER SYMPTOMS
HEADACHES: 0
WEAKNESS: 0
WHEEZING: 0
SHORTNESS OF BREATH: 0
SORE THROAT: 0
DYSURIA: 0
RHINORRHEA: 0
NECK PAIN: 0
ABDOMINAL PAIN: 0
NUMBNESS: 1
BACK PAIN: 0
FEVER: 0
CONSTIPATION: 0
FACIAL SWELLING: 0
WOUND: 0
EYE REDNESS: 0
EYE PAIN: 0
CHILLS: 0
DIARRHEA: 0
VOMITING: 0
FLANK PAIN: 0
COUGH: 0
DIZZINESS: 0
EYE DISCHARGE: 0
NAUSEA: 0
HEMATURIA: 0

## 2021-01-10 ASSESSMENT — MIFFLIN-ST. JEOR: SCORE: 2002.75

## 2021-01-10 NOTE — ED AVS SNAPSHOT
[facility] Emergency Department  [address] [address]  Phone: [phone]                                    Nevin Alvarado   MRN: [id number]    Department: [facility] Emergency Department   Date of Visit: 1/10/2021           After Visit Summary Signature Page    I have received my discharge instructions, and my questions have been answered. I have discussed any challenges I see with this plan with the nurse or doctor.    ..........................................................................................................................................  Patient/Patient Representative Signature      ..........................................................................................................................................  Patient Representative Print Name and Relationship to Patient    ..................................................               ................................................  Date                                   Time    ..........................................................................................................................................  Reviewed by Signature/Title    ...................................................              ..............................................  Date                                               Time          22EPIC Rev 08/18

## 2021-01-11 NOTE — ED PROVIDER NOTES
[place] EMERGENCY DEPARTMENT ([facility])  1/10/21  History     Chief Complaint   Patient presents with     One-sided Weakness     History was provided by the patient, EMS and medical records.        Nevin Alvarado is a 30 year old female with with a history of morbid obesity, borderline personality disorder, suicide attempt and neuropathy who presents to the Emergency Department via EMS from home for evaluation of one-sided weakness. The patient reports feeling a pinching pain in her back at approximately 19:00 this evening. She subsequently developed right-sided numbness from her shoulders through her feet. The patient eventually called EMS. Upon arrival patient was found to have a systolic maximum of 150. Blood glucose was found to be 88. No facial droop was noted. Here in the ED, patient complains of persistent right-sided numbness. She denies headache, cough, fever, chest pain, shortness of breath, nausea, vomiting or loss of taste or smell.      Past Medical History:   Diagnosis Date     ADD (attention deficit disorder)      Anorexia nervosa with bulimia     history of; on Topamax     Anxiety      Borderline personality disorder (H)      Depression      H/O self-harm      History of pulmonary embolism 12/2019    Provoked. Completed 3 month course of Apixaban     Morbid obesity (H)      Neuropathy      PTSD (post-traumatic stress disorder)      Rectal foreign body - Recurrent issue, self placed      Suicide attempt (H) 2/21/2018     Swallowed foreign body - Recurrent issue, self placed        Past Surgical History:   Procedure Laterality Date     COMBINED ESOPHAGOSCOPY, GASTROSCOPY, DUODENOSCOPY (EGD), REPLACE ESOPHAGEAL STENT N/A 10/9/2019    Procedure: Upper Endoscopy with Suture Placement;  Surgeon: Zurdo Ramirez MD;  Location:  OR     ESOPHAGOSCOPY, GASTROSCOPY, DUODENOSCOPY (EGD), COMBINED N/A 3/9/2017    Procedure: COMBINED ESOPHAGOSCOPY, GASTROSCOPY, DUODENOSCOPY (EGD),  REMOVE FOREIGN BODY;  Surgeon: Avis Guzmán MD;  [facility]     ESOPHAGOSCOPY, GASTROSCOPY, DUODENOSCOPY (EGD), COMBINED N/A 4/20/2017    Procedure: COMBINED ESOPHAGOSCOPY, GASTROSCOPY, DUODENOSCOPY (EGD), REMOVE FOREIGN BODY;  EGD removal Foregin body;  Surgeon: Lokesh Paula MD;  [facility]     ESOPHAGOSCOPY, GASTROSCOPY, DUODENOSCOPY (EGD), COMBINED N/A 6/12/2017    Procedure: COMBINED ESOPHAGOSCOPY, GASTROSCOPY, DUODENOSCOPY (EGD);  COMBINED ESOPHAGOSCOPY, GASTROSCOPY, DUODENOSCOPY (EGD) [[id number]]attempted removal of foreign body;  Surgeon: Pamela Perez MD;  [facility]     ESOPHAGOSCOPY, GASTROSCOPY, DUODENOSCOPY (EGD), COMBINED N/A 6/9/2017    Procedure: COMBINED ESOPHAGOSCOPY, GASTROSCOPY, DUODENOSCOPY (EGD), REMOVE FOREIGN BODY;  Esophagoscopy, Gastroscopy, Duodenoscopy, Removal of Foreign Body;  Surgeon: Dejon Marsh MD;  [facility]     ESOPHAGOSCOPY, GASTROSCOPY, DUODENOSCOPY (EGD), COMBINED N/A 1/6/2018    Procedure: COMBINED ESOPHAGOSCOPY, GASTROSCOPY, DUODENOSCOPY (EGD), REMOVE FOREIGN BODY;  COMBINED ESOPHAGOSCOPY, GASTROSCOPY, DUODENOSCOPY (EGD) [by pascal net and snare with profol sedation;  Surgeon: Feliciano Emmanuel MD;  Location:  GI     ESOPHAGOSCOPY, GASTROSCOPY, DUODENOSCOPY (EGD), COMBINED N/A 3/19/2018    Procedure: COMBINED ESOPHAGOSCOPY, GASTROSCOPY, DUODENOSCOPY (EGD), REMOVE FOREIGN BODY;   Esophagodscopy, Gastroscopy, Duodenoscopy,Foreign Body Removal;  Surgeon: Brice Guzmán MD;  [facility]     ESOPHAGOSCOPY, GASTROSCOPY, DUODENOSCOPY (EGD), COMBINED N/A 4/16/2018    Procedure: COMBINED ESOPHAGOSCOPY, GASTROSCOPY, DUODENOSCOPY (EGD), REMOVE FOREIGN BODY;  Esophagogastroduodenoscopy  Foreign Body Removal X 2;  Surgeon: Royer Moise MD;  [facility]     ESOPHAGOSCOPY, GASTROSCOPY, DUODENOSCOPY (EGD), COMBINED N/A 6/1/2018    Procedure: COMBINED ESOPHAGOSCOPY, GASTROSCOPY, DUODENOSCOPY  (EGD), REMOVE FOREIGN BODY;  COMBINED ESOPHAGOSCOPY, GASTROSCOPY, DUODENOSCOPY with removal of foreign body, propofol sedation by anesthesia;  Surgeon: Brice Martinez MD;  Location:  GI     ESOPHAGOSCOPY, GASTROSCOPY, DUODENOSCOPY (EGD), COMBINED N/A 7/25/2018    Procedure: COMBINED ESOPHAGOSCOPY, GASTROSCOPY, DUODENOSCOPY (EGD), REMOVE FOREIGN BODY;;  Surgeon: Candy Castelan MD;  Location:  GI     ESOPHAGOSCOPY, GASTROSCOPY, DUODENOSCOPY (EGD), COMBINED N/A 7/28/2018    Procedure: COMBINED ESOPHAGOSCOPY, GASTROSCOPY, DUODENOSCOPY (EGD), REMOVE FOREIGN BODY;  COMBINED ESOPHAGOSCOPY, GASTROSCOPY, DUODENOSCOPY (EGD), REMOVE FOREIGN BODY;  Surgeon: Brice Guzmán MD;  [facility]     ESOPHAGOSCOPY, GASTROSCOPY, DUODENOSCOPY (EGD), COMBINED N/A 7/31/2018    Procedure: COMBINED ESOPHAGOSCOPY, GASTROSCOPY, DUODENOSCOPY (EGD);  COMBINED ESOPHAGOSCOPY, GASTROSCOPY, DUODENOSCOPY (EGD) TO REMOVE FOREIGN BODY;  Surgeon: Lokesh Paula MD;  [facility]     ESOPHAGOSCOPY, GASTROSCOPY, DUODENOSCOPY (EGD), COMBINED N/A 8/4/2018    Procedure: COMBINED ESOPHAGOSCOPY, GASTROSCOPY, DUODENOSCOPY (EGD), REMOVE FOREIGN BODY;   combined esophagoscopy, gastroscopy, duodenoscopy, REMOVE FOREIGN BODY ;  Surgeon: Lokesh Paula MD;  [facility]     ESOPHAGOSCOPY, GASTROSCOPY, DUODENOSCOPY (EGD), COMBINED N/A 10/6/2019    Procedure: ESOPHAGOGASTRODUODENOSCOPY (EGD) with fireign body removal x2, esophageal stent placement with floroscopy;  Surgeon: Timoteo Espana MD;  [facility]     ESOPHAGOSCOPY, GASTROSCOPY, DUODENOSCOPY (EGD), COMBINED N/A 12/2/2019    Procedure: Esophagogastroduodenoscopy with esophageal stent removal, esophogram;  Surgeon: Kailee Tena MD;  [facility]     ESOPHAGOSCOPY, GASTROSCOPY, DUODENOSCOPY (EGD), COMBINED N/A 12/17/2019    Procedure: ESOPHAGOGASTRODUODENOSCOPY, WITH FOREIGN BODY REMOVAL;  Surgeon: Pamela Perez MD;  Location:  OR      ESOPHAGOSCOPY, GASTROSCOPY, DUODENOSCOPY (EGD), COMBINED N/A 12/13/2019    Procedure: ESOPHAGOGASTRODUODENOSCOPY, WITH FOREIGN BODY REMOVAL;  Surgeon: Samia Stanton MD;  [facility]     ESOPHAGOSCOPY, GASTROSCOPY, DUODENOSCOPY (EGD), COMBINED N/A 12/28/2019    Procedure: ESOPHAGOGASTRODUODENOSCOPY (EGD) Removal of Foreign Body X 2;  Surgeon: Huy Kelley MD;  [facility]     ESOPHAGOSCOPY, GASTROSCOPY, DUODENOSCOPY (EGD), COMBINED N/A 1/5/2020    Procedure: ESOPHAGOGASTRODUOENOSCOPY WITH FOREIGN BODY REMOVAL;  Surgeon: Pamela Perez MD;  [facility]     ESOPHAGOSCOPY, GASTROSCOPY, DUODENOSCOPY (EGD), COMBINED N/A 1/3/2020    Procedure: ESOPHAGOGASTRODUODENOSCOPY (EGD) REMOVAL OF FOREIGN BODY.;  Surgeon: Pamela Perez MD;  [facility]     ESOPHAGOSCOPY, GASTROSCOPY, DUODENOSCOPY (EGD), COMBINED N/A 1/13/2020    Procedure: ESOPHAGOGASTRODUODENOSCOPY (EGD) for foreign body removal;  Surgeon: Lokesh Paula MD;  [facility]     ESOPHAGOSCOPY, GASTROSCOPY, DUODENOSCOPY (EGD), COMBINED N/A 1/18/2020    Procedure: Diagnostic ESOPHAGOGASTRODUODENOSCOPY (EGD;  Surgeon: Lokesh Paula MD;  [facility]     ESOPHAGOSCOPY, GASTROSCOPY, DUODENOSCOPY (EGD), COMBINED N/A 3/29/2020    Procedure: UPPER ENDOSCOPY WITH FOREIGN BODY REMOVAL;  Surgeon: Doug Hansen MD;  [facility]     ESOPHAGOSCOPY, GASTROSCOPY, DUODENOSCOPY (EGD), COMBINED N/A 7/11/2020    Procedure: ESOPHAGOGASTRODUODENOSCOPY (EGD); Upper Endoscopy WITH FOREIGN BODY REMOVAL;  Surgeon: Lyndsey Mendoza DO;  [facility]     ESOPHAGOSCOPY, GASTROSCOPY, DUODENOSCOPY (EGD), COMBINED N/A 7/29/2020    Procedure: ESOPHAGOGASTRODUODENOSCOPY REMOVAL OF FOREIGN BODY;  Surgeon: Samia Stanton MD;  [facility]     ESOPHAGOSCOPY, GASTROSCOPY, DUODENOSCOPY (EGD), COMBINED N/A 8/1/2020    Procedure: ESOPHAGOGASTRODUODENOSCOPY, WITH FOREIGN BODY REMOVAL;  Surgeon: Chris  Pamela Mcclelland MD;  [facility]     ESOPHAGOSCOPY, GASTROSCOPY, DUODENOSCOPY (EGD), COMBINED N/A 8/18/2020    Procedure: ESOPHAGOGASTRODUODENOSCOPY (EGD) for foreign body removal;  Surgeon: Pamela Perez MD;  [facility]     ESOPHAGOSCOPY, GASTROSCOPY, DUODENOSCOPY (EGD), COMBINED N/A 8/27/2020    Procedure: ESOPHAGOGASTRODUODENOSCOPY (EGD) with foreign body removal;  Surgeon: Campbell Rogers MD;  [facility]     ESOPHAGOSCOPY, GASTROSCOPY, DUODENOSCOPY (EGD), COMBINED N/A 9/18/2020    Procedure: ESOPHAGOGASTRODUODENOSCOPY (EGD) with foreign body removal;  Surgeon: Dick Gillis MD;  [facility]     ESOPHAGOSCOPY, GASTROSCOPY, DUODENOSCOPY (EGD), COMBINED N/A 11/18/2020    Procedure: ESOPHAGOGASTRODUODENOSCOPY, WITH FOREIGN BODY REMOVAL;  Surgeon: Felipe Ulloa DO;  [facility]     ESOPHAGOSCOPY, GASTROSCOPY, DUODENOSCOPY (EGD), COMBINED N/A 11/28/2020    Procedure: ESOPHAGOGASTRODUODENOSCOPY (EGD);  Surgeon: Campbell Rogers MD;  [facility]     EXAM UNDER ANESTHESIA ANUS N/A 1/10/2017    Procedure: EXAM UNDER ANESTHESIA ANUS;  Surgeon: Annmarie Haynes MD;  [facility]     EXAM UNDER ANESTHESIA RECTUM N/A 7/19/2018    Procedure: EXAM UNDER ANESTHESIA RECTUM;  EXAM UNDER ANESTHESIA, REMOVAL OF RECTAL FOREIGN BODY;  Surgeon: Annmarie Haynes MD;  [facility]     HC REMOVE FECAL IMPACTION OR FB W ANESTHESIA N/A 12/18/2016    Procedure: REMOVE FECAL IMPACTION/FOREIGN BODY UNDER ANESTHESIA;  Surgeon: Soham Cano MD;  Location: [facility] OR     KNEE SURGERY - removed a small tissue mass from knee Right      LAPAROSCOPIC ABLATION ENDOMETRIOSIS       LAPAROTOMY EXPLORATORY N/A 1/10/2017    Procedure: LAPAROTOMY EXPLORATORY;  Surgeon: Annmarie Haynes MD;  [facility]     LAPAROTOMY EXPLORATORY  09/11/2019    Manual manipulation of bowel to remove pill bottle in rectum     lymph nodes removed from neck; benign   age 7     MAMMOPLASTY REDUCTION Bilateral      RELEASE CARPAL TUNNEL Bilateral      SIGMOIDOSCOPY FLEXIBLE N/A 1/10/2017    Procedure: SIGMOIDOSCOPY FLEXIBLE;  Surgeon: Annmarie Haynes MD;  [facility]     SIGMOIDOSCOPY FLEXIBLE N/A 5/8/2018    Procedure: SIGMOIDOSCOPY FLEXIBLE;  flex sig with foreign body removal using snare and rattooth forcep;  Surgeon: Soham Cano MD;  Location: [facility] GI     SIGMOIDOSCOPY FLEXIBLE N/A 2/20/2019    Procedure: Exam under anesthesia Colonoscopy with attempted  removal of rectal foreign body;  Surgeon: Estrada Chávez MD;  [facility]       Family History   Problem Relation Age of Onset     Diabetes Type 2  Maternal Grandmother      Diabetes Type 2  Paternal Grandmother      Breast Cancer Paternal Grandmother      Cerebrovascular Disease Father 53     Myocardial Infarction No family hx of      Coronary Artery Disease Early Onset No family hx of      Ovarian Cancer No family hx of      Colon Cancer No family hx of        Social History     Tobacco Use     Smoking status: Never Smoker     Smokeless tobacco: Never Used   Substance Use Topics     Alcohol use: No     Alcohol/week: 0.0 standard drinks     Current Facility-Administered Medications   Medication     heparin 100 UNIT/ML injection 5 mL     Lidocaine (LIDOCARE) 4 % Patch 1 patch     Current Outpatient Medications   Medication     acetaminophen (TYLENOL) 500 MG tablet     albuterol (PROAIR HFA/PROVENTIL HFA/VENTOLIN HFA) 108 (90 Base) MCG/ACT inhaler     busPIRone (BUSPAR) 15 MG tablet     Cholecalciferol (VITAMIN D) 50 MCG (2000 UT) CAPS     cloNIDine (CATAPRES) 0.1 MG tablet     desvenlafaxine (PRISTIQ) 100 MG 24 hr tablet     hydroxychloroquine (PLAQUENIL) 200 MG tablet     lurasidone (LATUDA) 60 MG TABS tablet     metFORMIN (GLUCOPHAGE-XR) 500 MG 24 hr tablet     pregabalin (LYRICA) 50 MG capsule     sennosides (SENOKOT) 8.6 MG tablet     valACYclovir (VALTREX) 1000 mg tablet        Allergies   Allergen  Reactions     Amoxicillin-Pot Clavulanate Other (See Comments), Swelling and Rash     PN: facial swelling, left side. Also had cortisone injection the same day as she started the Augmentin.  Noted in downtime recovery of chart.    PN: facial swelling, left side. Also had cortisone injection the same day as she started the Augmentin.; HUT Comment: PN: facial swelling, left side. Also had cortisone injection the same day as she started the Augmentin.Noted in downtime recovery of chart.; HUT Reaction: Rash; HUT Reaction: Unknown; HUT Reaction Type: Allergy; HUT Severity: Med; HUT Noted: [id number]     Oseltamivir Hives     med stopped, PN: med stopped  med stopped, PN: med stopped; HUT Comment: med stopped, PN: med stopped; HUT Reaction: Hives; HUT Reaction Type: Allergy; HUT Severity: Med; HUT Noted: [id number]     Penicillins Anaphylaxis     HUT Reaction: Anaphylaxis; HUT Reaction Type: Allergy; HUT Severity: High; HUT Noted: [id number]     Vancomycin Itching, Swelling and Rash     Other reaction(s): Redness  Flushed face, very itchy; HUT Comment: Flushed face, very itchy; HUT Reaction: Angioedema; HUT Reaction: Redness; HUT Severity: Med; HUT Noted: [id number]    facial     Hydrocodone Nausea and Vomiting and GI Disturbance     vomiting for days, PN: vomiting for days; HUT Comment: vomiting for days; HUT Reaction: Gastrointestinal; HUT Reaction: Nausea And Vomiting; HUT Reaction Type: Intolerance; HUT Severity: Med; HUT Noted: [id number]  vomiting for days       Blood-Group Specific Substance Other (See Comments)     Patient has an anti-Cw and non-specific antibodies. Blood product orders may be delayed. Draw one red top and two purple top tubes for all type/screen/crossmatch orders.  Patient has anti-Cw, anti-K (Angella), Warm auto and nonspecific antibodies. Blood products may be delayed. Draw patient 24 hours prior to transfusion. Draw one red top and two purple top tubes for all type and screen orders.     Oxycodone Swelling  "    Cephalosporins Rash     Influenza Vaccines Other (See Comments) and Nausea and Vomiting     HUT Reaction: Nausea And Vomiting; HUT Reaction Type: Intolerance; HUT Severity: Low; HUT Noted: [id number]     Lamotrigine Rash     Possibly associated with Lamictal.   HUT Comment: Possibly associated with Lamictal. ; HUT Reaction: Rash; HUT Reaction Type: Allergy; HUT Severity: Low; HUT Noted: [id number]     Latex Rash     HUT Reaction: Rash; HUT Reaction Type: Allergy; HUT Severity: Low; HUT Noted: [id number]         Review of Systems   Constitutional: Negative for chills and fever.   HENT: Negative for ear pain, facial swelling, rhinorrhea and sore throat.    Eyes: Negative for pain, discharge, redness and visual disturbance.   Respiratory: Negative for cough, shortness of breath and wheezing.    Cardiovascular: Negative for chest pain.   Gastrointestinal: Negative for abdominal pain, constipation, diarrhea, nausea and vomiting.   Genitourinary: Negative for dysuria, flank pain, hematuria, vaginal bleeding and vaginal discharge.   Musculoskeletal: Negative for back pain and neck pain.   Skin: Negative for rash and wound.   Neurological: Positive for numbness (Right-sided). Negative for dizziness, weakness and headaches.     A complete review of systems was performed with pertinent positives and negatives noted in the HPI, and all other systems negative.    Physical Exam   BP: 133/82  Pulse: 94  Resp: 16  Height: 157.5 cm (5' 2\")  Weight: 132.5 kg (292 lb)  SpO2: 99 %  Physical Exam  Constitutional:       General: She is not in acute distress.     Appearance: Normal appearance. She is not diaphoretic.   HENT:      Head: Normocephalic and atraumatic.      Nose: Nose normal.      Mouth/Throat:      Mouth: Mucous membranes are moist.      Pharynx: Oropharynx is clear. No oropharyngeal exudate.   Eyes:      General: Lids are normal. No scleral icterus.     Extraocular Movements: Extraocular movements intact.      " Conjunctiva/sclera: Conjunctivae normal.      Pupils: Pupils are equal, round, and reactive to light.   Neck:      Musculoskeletal: Full passive range of motion without pain, normal range of motion and neck supple.   Cardiovascular:      Rate and Rhythm: Normal rate and regular rhythm.      Pulses: Normal pulses.      Heart sounds: Normal heart sounds. No murmur. No friction rub. No gallop.    Pulmonary:      Effort: Pulmonary effort is normal. No respiratory distress.      Breath sounds: Normal breath sounds. No stridor. No wheezing, rhonchi or rales.   Abdominal:      General: Bowel sounds are normal.      Palpations: Abdomen is soft.      Tenderness: There is no abdominal tenderness.   Musculoskeletal: Normal range of motion.         General: No tenderness.   Skin:     General: Skin is warm and dry.      Capillary Refill: Capillary refill takes less than 2 seconds.      Findings: No rash.   Neurological:      General: No focal deficit present.      Mental Status: She is oriented to person, place, and time. Mental status is at baseline.      GCS: GCS eye subscore is 4. GCS verbal subscore is 5. GCS motor subscore is 6.      Cranial Nerves: Cranial nerves are intact.      Sensory: Sensory deficit present.      Motor: Motor function is intact.      Coordination: Coordination is intact.      Gait: Gait is intact.      Deep Tendon Reflexes:      Reflex Scores:       Tricep reflexes are 2+ on the right side and 2+ on the left side.       Bicep reflexes are 2+ on the right side and 2+ on the left side.       Brachioradialis reflexes are 2+ on the right side and 2+ on the left side.       Patellar reflexes are 2+ on the right side and 2+ on the left side.       Achilles reflexes are 2+ on the right side and 2+ on the left side.     Comments: Subjective numbness to right UE, small amount of drift   Psychiatric:         Attention and Perception: Attention normal.         Mood and Affect: Mood normal.         Speech: Speech  normal.         Behavior: Behavior normal.       ED Course     8:27 PM  The patient was seen and examined by Dr. Jose Galvez in Room ED 03     Procedures             Critical Care Addendum    My initial assessment, based on my review of prehospital provider report, review of nursing observations, review of vital signs, focused history, physical exam, review of cardiac rhythm monitor and discussion with neurology, established that Nevin Alvarado has focal neurologic abnormalities, which requires immediate intervention, and therefore she is critically ill.     After the initial assessment, the care team initiated multiple lab tests and consulted with neurology to provide stabilization care. Due to the critical nature of this patient, I reassessed nursing observations, vital signs, physical exam, review of cardiac rhythm monitor, mental status, neurologic status and respiratory status multiple times prior to her disposition.     Time also spent performing documentation, reviewing test results, discussion with consultants and coordination of care.     Critical care time (excluding teaching time and procedures): 30 minutes.   The patient has stroke symptoms:         ED Stroke specific documentation           NIHSS PDF     Patient last known well time: 19:00  ED Provider first to bedside at: 20:27  CT Results not obtained after conferring with neurology    tPA:   Not given due to not a CVA.    If treating with tPA: Ensure SBP<185 and DBP<105 prior to treatment with IV tPA.  Administering IV tPA after treatment with IV labetalol, hydralazine, or nicardipine is reasonable once BP control is established.    Endovascular Retrieval:  Not indicated    National [employer] of [employer] Stroke Scale (Baseline)  Time Performed: 20:27     Score    Level of consciousness: (0)   Alert, keenly responsive    LOC questions: (0)   Answers both questions correctly    LOC commands: (0)   Performs both tasks correctly    Best  gaze: (0)   Normal    Visual: (0)   No visual loss    Facial palsy: (0)   Normal symmetrical movements    Motor arm (left): (0)   No drift    Motor arm (right): (1)   Drift    Motor leg (left): (0)   No drift    Motor leg (right): (0)   No drift    Limb ataxia: (0)   Absent    Sensory: (1)   Mild to moderate sensory loss    Best language: (0)   Normal- no aphasia    Dysarthria: (0)   Normal    Extinction and inattention: (0)   No abnormality        Total Score:  2        Stroke Mimics were considered (including migraine headache, seizure disorder, hypoglycemia (or hyperglycemia), head or spinal trauma, CNS infection, Toxin ingestion and shock state (e.g. sepsis) .         Results for orders placed or performed during the hospital encounter of 01/10/21   Glucose by meter     Status: Abnormal   Result Value Ref Range    Glucose 109 (H) 70 - 99 mg/dL     Medications   Lidocaine (LIDOCARE) 4 % Patch 1 patch (1 patch Transdermal Patch/Med Applied 1/10/21 2100)   heparin 100 UNIT/ML injection 5 mL (5 mLs Intracatheter Given 1/10/21 2100)        Assessments & Plan (with Medical Decision Making)   This is a 30 year old female who presents to the ED for evaluation of right UE weakness. Given her history and examination, my suspicion is that the symptoms are likely secondary to peripheral neuropathy, prehospital stroke code was called by EMS, hemorrhagic versus ischemic. Other possibilities include TIA that has not yet resolved, occult traumatic hemorrhage or injury, complicated migraine, less likely malignancy given the course and onset.   Evaluation and management will include activation of stroke protocol and neurological evaluation for assistance with treatment decisions and disposition.     After further discussion with neurology, we both agree that the likelihood of an acute intracranial process is exceedingly low, especially based on evaluation of her prior medical records, and her relatively benign exam performed by  both myself and the neurology team here as a result of the stroke code.    We both agree this is likely peripheral neuropathy, we will consult patient on some pain management strategies, and have her follow-up with primary care for possible physical therapy, as well as possible referral to spinal specialist.  Her peripheral neuropathy is well-established and a chronic problem, neurology agrees that at this time there is no emergent or urgent indication for her subjective distal right upper extremity paresthesias that warrant further evaluation or inpatient admission.    The patient's workup and evaluation during their Emergency Department stay was reviewed with the patient. She is comfortable going home based on our discussion with her. They are amenable to this plan. They will follow up with PCP in 3 days time. The signs/symptoms to prompt return to the Emergency Department were discussed with the patient and they expressed understanding. All questions were answered.       I have reviewed the nursing notes. I have reviewed the findings, diagnosis, plan and need for follow up with the patient.    Discharge Medication List as of 1/10/2021  9:01 PM          Final diagnoses:   Neuropathy     I, Nish Aburto, am serving as a trained medical scribe to document services personally performed by Jose Galvez MD, based on the provider's statements to me.      I, Jose Galvez MD, was physically present and have reviewed and verified the accuracy of this note documented by Nish Aburto.  --  Jose Galvez MD  [facility] EMERGENCY DEPARTMENT  1/10/2021     Jose Galvez MD  01/10/21 2128

## 2021-01-11 NOTE — CONSULTS
[facility]     Stroke Consult Note    Reason for Consult: Stroke Code    Chief Complaint: One-sided Weakness      HPI  Nevin Alvarado is a 30 year old female with PMH of polyneuropathy, chronic pain, depression, borderline personality disorder, who presented with right sided weakness.  On initial evaluation, she reports that around 19:00 she developed a pinching/sharp pain in her right shoulder then experienced right upper extremity weakness and numbness and tingling.  She reports that her arm feels heavy.  She also has significant polyneuropathy for which she takes Lyrica.  She has had this back pain before in the past.  On examination, when activating her right upper extremity muscles, she reports that her shoulder pain is worsened.    A stroke code was called prior to arrival by EMS.  However, on evaluation by the stroke team and the primary ED attending, it was felt that the symptoms did not represent a stroke, therefore the stroke code was canceled.  CT head and CTA head and neck were not obtained because of this.  Of note, the patient has a care plan in her chart that CT imaging of the head should be limited.     TPA Treatment   Not given due to stroke code cancelled, symptoms unlikely to be due to a stroke.    Endovascular Treatment  Not performed due tos troke code cancelled, symptoms unlikely to be due to a stroke.    Impression  #Right sided numbness and tingling  30yoF with PMH of polyneuropathy, chronic pain, depression, and borderline personality disorder who presented with right sided numbness and tingling and and pain-limited right sided weakness. A stroke code was called prior to arrival due to the patient's R sided weakness, however cancelled on arrival as the symptoms did not appear to represent a stroke. Head CT/CTA were deferred because of this and because she has a care plan note in her chart asking to limit unnecessary work-up.      Recommendations  -Pain management per ED provider  -No additional work-up needed from a stroke perspective      Thank you for this consult. No further stroke evaluation is recommended, so we will sign off. Please contact us with any additional questions.    The patient was discussed with Stroke Fellow, Dr. Tirado.  The Stroke Staff is Dr. Fournier.    France Cook MD  Neurology PGY-2  1/10/2021 20:53  ______________________________________________________    Past Medical History   Past Medical History:   Diagnosis Date     ADD (attention deficit disorder)      Anorexia nervosa with bulimia     history of; on Topamax     Anxiety      Borderline personality disorder (H)      Depression      H/O self-harm      History of pulmonary embolism 12/2019    Provoked. Completed 3 month course of Apixaban     Morbid obesity (H)      Neuropathy      PTSD (post-traumatic stress disorder)      Rectal foreign body - Recurrent issue, self placed      Suicide attempt (H) 2/21/2018     Swallowed foreign body - Recurrent issue, self placed      Past Surgical History   Past Surgical History:   Procedure Laterality Date     COMBINED ESOPHAGOSCOPY, GASTROSCOPY, DUODENOSCOPY (EGD), REPLACE ESOPHAGEAL STENT N/A 10/9/2019    Procedure: Upper Endoscopy with Suture Placement;  Surgeon: Zurdo Ramirez MD;  [facility]     ESOPHAGOSCOPY, GASTROSCOPY, DUODENOSCOPY (EGD), COMBINED N/A 3/9/2017    Procedure: COMBINED ESOPHAGOSCOPY, GASTROSCOPY, DUODENOSCOPY (EGD), REMOVE FOREIGN BODY;  Surgeon: Avis Guzmán MD;  [facility]     ESOPHAGOSCOPY, GASTROSCOPY, DUODENOSCOPY (EGD), COMBINED N/A 4/20/2017    Procedure: COMBINED ESOPHAGOSCOPY, GASTROSCOPY, DUODENOSCOPY (EGD), REMOVE FOREIGN BODY;  EGD removal Foregin body;  Surgeon: Lokesh Paula MD;  [facility]     ESOPHAGOSCOPY, GASTROSCOPY, DUODENOSCOPY (EGD), COMBINED N/A 6/12/2017    Procedure: COMBINED ESOPHAGOSCOPY, GASTROSCOPY, DUODENOSCOPY (EGD);  COMBINED  ESOPHAGOSCOPY, GASTROSCOPY, DUODENOSCOPY (EGD) [[id number]]attempted removal of foreign body;  Surgeon: Pamela Perez MD;  [facility]     ESOPHAGOSCOPY, GASTROSCOPY, DUODENOSCOPY (EGD), COMBINED N/A 6/9/2017    Procedure: COMBINED ESOPHAGOSCOPY, GASTROSCOPY, DUODENOSCOPY (EGD), REMOVE FOREIGN BODY;  Esophagoscopy, Gastroscopy, Duodenoscopy, Removal of Foreign Body;  Surgeon: Dejon Marsh MD;  [facility]     ESOPHAGOSCOPY, GASTROSCOPY, DUODENOSCOPY (EGD), COMBINED N/A 1/6/2018    Procedure: COMBINED ESOPHAGOSCOPY, GASTROSCOPY, DUODENOSCOPY (EGD), REMOVE FOREIGN BODY;  COMBINED ESOPHAGOSCOPY, GASTROSCOPY, DUODENOSCOPY (EGD) [by pascal net and snare with profol sedation;  Surgeon: Feliciano Emmanuel MD;  [facility]     ESOPHAGOSCOPY, GASTROSCOPY, DUODENOSCOPY (EGD), COMBINED N/A 3/19/2018    Procedure: COMBINED ESOPHAGOSCOPY, GASTROSCOPY, DUODENOSCOPY (EGD), REMOVE FOREIGN BODY;   Esophagodscopy, Gastroscopy, Duodenoscopy,Foreign Body Removal;  Surgeon: Brice Guzmán MD;  [facility]     ESOPHAGOSCOPY, GASTROSCOPY, DUODENOSCOPY (EGD), COMBINED N/A 4/16/2018    Procedure: COMBINED ESOPHAGOSCOPY, GASTROSCOPY, DUODENOSCOPY (EGD), REMOVE FOREIGN BODY;  Esophagogastroduodenoscopy  Foreign Body Removal X 2;  Surgeon: Royer Moise MD;  [facility]     ESOPHAGOSCOPY, GASTROSCOPY, DUODENOSCOPY (EGD), COMBINED N/A 6/1/2018    Procedure: COMBINED ESOPHAGOSCOPY, GASTROSCOPY, DUODENOSCOPY (EGD), REMOVE FOREIGN BODY;  COMBINED ESOPHAGOSCOPY, GASTROSCOPY, DUODENOSCOPY with removal of foreign body, propofol sedation by anesthesia;  Surgeon: Brice Martinez MD;  [facility]     ESOPHAGOSCOPY, GASTROSCOPY, DUODENOSCOPY (EGD), COMBINED N/A 7/25/2018    Procedure: COMBINED ESOPHAGOSCOPY, GASTROSCOPY, DUODENOSCOPY (EGD), REMOVE FOREIGN BODY;;  Surgeon: Candy Castelan MD;  Location: [facility]     ESOPHAGOSCOPY, GASTROSCOPY, DUODENOSCOPY (EGD), COMBINED N/A  7/28/2018    Procedure: COMBINED ESOPHAGOSCOPY, GASTROSCOPY, DUODENOSCOPY (EGD), REMOVE FOREIGN BODY;  COMBINED ESOPHAGOSCOPY, GASTROSCOPY, DUODENOSCOPY (EGD), REMOVE FOREIGN BODY;  Surgeon: Brice Guzmán MD;  [facility]     ESOPHAGOSCOPY, GASTROSCOPY, DUODENOSCOPY (EGD), COMBINED N/A 7/31/2018    Procedure: COMBINED ESOPHAGOSCOPY, GASTROSCOPY, DUODENOSCOPY (EGD);  COMBINED ESOPHAGOSCOPY, GASTROSCOPY, DUODENOSCOPY (EGD) TO REMOVE FOREIGN BODY;  Surgeon: Lokesh Paula MD;  [facility]     ESOPHAGOSCOPY, GASTROSCOPY, DUODENOSCOPY (EGD), COMBINED N/A 8/4/2018    Procedure: COMBINED ESOPHAGOSCOPY, GASTROSCOPY, DUODENOSCOPY (EGD), REMOVE FOREIGN BODY;   combined esophagoscopy, gastroscopy, duodenoscopy, REMOVE FOREIGN BODY ;  Surgeon: Lokesh Paula MD;  [facility]     ESOPHAGOSCOPY, GASTROSCOPY, DUODENOSCOPY (EGD), COMBINED N/A 10/6/2019    Procedure: ESOPHAGOGASTRODUODENOSCOPY (EGD) with fireign body removal x2, esophageal stent placement with floroscopy;  Surgeon: Timoteo Espana MD;  [facility]     ESOPHAGOSCOPY, GASTROSCOPY, DUODENOSCOPY (EGD), COMBINED N/A 12/2/2019    Procedure: Esophagogastroduodenoscopy with esophageal stent removal, esophogram;  Surgeon: Kailee Tena MD;  [facility]     ESOPHAGOSCOPY, GASTROSCOPY, DUODENOSCOPY (EGD), COMBINED N/A 12/17/2019    Procedure: ESOPHAGOGASTRODUODENOSCOPY, WITH FOREIGN BODY REMOVAL;  Surgeon: Pamela Perez MD;  [facility]     ESOPHAGOSCOPY, GASTROSCOPY, DUODENOSCOPY (EGD), COMBINED N/A 12/13/2019    Procedure: ESOPHAGOGASTRODUODENOSCOPY, WITH FOREIGN BODY REMOVAL;  Surgeon: Samia Stanton MD;  [facility]     ESOPHAGOSCOPY, GASTROSCOPY, DUODENOSCOPY (EGD), COMBINED N/A 12/28/2019    Procedure: ESOPHAGOGASTRODUODENOSCOPY (EGD) Removal of Foreign Body X 2;  Surgeon: Huy Kelley MD;  [facility]     ESOPHAGOSCOPY, GASTROSCOPY, DUODENOSCOPY (EGD), COMBINED N/A 1/5/2020    Procedure:  ESOPHAGOGASTRODUOENOSCOPY WITH FOREIGN BODY REMOVAL;  Surgeon: Pamela Perez MD;  [facility]     ESOPHAGOSCOPY, GASTROSCOPY, DUODENOSCOPY (EGD), COMBINED N/A 1/3/2020    Procedure: ESOPHAGOGASTRODUODENOSCOPY (EGD) REMOVAL OF FOREIGN BODY.;  Surgeon: Pamela Perez MD;  [facility]     ESOPHAGOSCOPY, GASTROSCOPY, DUODENOSCOPY (EGD), COMBINED N/A 1/13/2020    Procedure: ESOPHAGOGASTRODUODENOSCOPY (EGD) for foreign body removal;  Surgeon: Lokesh Paula MD;  [facility]     ESOPHAGOSCOPY, GASTROSCOPY, DUODENOSCOPY (EGD), COMBINED N/A 1/18/2020    Procedure: Diagnostic ESOPHAGOGASTRODUODENOSCOPY (EGD;  Surgeon: Lokesh Paula MD;  [facility]     ESOPHAGOSCOPY, GASTROSCOPY, DUODENOSCOPY (EGD), COMBINED N/A 3/29/2020    Procedure: UPPER ENDOSCOPY WITH FOREIGN BODY REMOVAL;  Surgeon: Doug Hansen MD;  [facility]     ESOPHAGOSCOPY, GASTROSCOPY, DUODENOSCOPY (EGD), COMBINED N/A 7/11/2020    Procedure: ESOPHAGOGASTRODUODENOSCOPY (EGD); Upper Endoscopy WITH FOREIGN BODY REMOVAL;  Surgeon: Lyndsey Mendoza DO;  [facility]     ESOPHAGOSCOPY, GASTROSCOPY, DUODENOSCOPY (EGD), COMBINED N/A 7/29/2020    Procedure: ESOPHAGOGASTRODUODENOSCOPY REMOVAL OF FOREIGN BODY;  Surgeon: Samia Stanton MD;  [facility]     ESOPHAGOSCOPY, GASTROSCOPY, DUODENOSCOPY (EGD), COMBINED N/A 8/1/2020    Procedure: ESOPHAGOGASTRODUODENOSCOPY, WITH FOREIGN BODY REMOVAL;  Surgeon: Pamela Perez MD;  [facility]     ESOPHAGOSCOPY, GASTROSCOPY, DUODENOSCOPY (EGD), COMBINED N/A 8/18/2020    Procedure: ESOPHAGOGASTRODUODENOSCOPY (EGD) for foreign body removal;  Surgeon: Pamela Perez MD;  [facility]     ESOPHAGOSCOPY, GASTROSCOPY, DUODENOSCOPY (EGD), COMBINED N/A 8/27/2020    Procedure: ESOPHAGOGASTRODUODENOSCOPY (EGD) with foreign body removal;  Surgeon: Campbell Rogers MD;  [facility]     ESOPHAGOSCOPY, GASTROSCOPY,  DUODENOSCOPY (EGD), COMBINED N/A 9/18/2020    Procedure: ESOPHAGOGASTRODUODENOSCOPY (EGD) with foreign body removal;  Surgeon: Dick Gillis MD;  [facility]     ESOPHAGOSCOPY, GASTROSCOPY, DUODENOSCOPY (EGD), COMBINED N/A 11/18/2020    Procedure: ESOPHAGOGASTRODUODENOSCOPY, WITH FOREIGN BODY REMOVAL;  Surgeon: Felipe Ulloa DO;  [facility]     ESOPHAGOSCOPY, GASTROSCOPY, DUODENOSCOPY (EGD), COMBINED N/A 11/28/2020    Procedure: ESOPHAGOGASTRODUODENOSCOPY (EGD);  Surgeon: Campbell Rogers MD;  [facility]     EXAM UNDER ANESTHESIA ANUS N/A 1/10/2017    Procedure: EXAM UNDER ANESTHESIA ANUS;  Surgeon: Annmarie Haynes MD;  [facility]     EXAM UNDER ANESTHESIA RECTUM N/A 7/19/2018    Procedure: EXAM UNDER ANESTHESIA RECTUM;  EXAM UNDER ANESTHESIA, REMOVAL OF RECTAL FOREIGN BODY;  Surgeon: Annmarie Haynes MD;  [facility]     HC REMOVE FECAL IMPACTION OR FB W ANESTHESIA N/A 12/18/2016    Procedure: REMOVE FECAL IMPACTION/FOREIGN BODY UNDER ANESTHESIA;  Surgeon: Soham Cano MD;  Location: [facility]     KNEE SURGERY - removed a small tissue mass from knee Right      LAPAROSCOPIC ABLATION ENDOMETRIOSIS       LAPAROTOMY EXPLORATORY N/A 1/10/2017    Procedure: LAPAROTOMY EXPLORATORY;  Surgeon: Annmarie Haynes MD;  [facility]     LAPAROTOMY EXPLORATORY  09/11/2019    Manual manipulation of bowel to remove pill bottle in rectum     lymph nodes removed from neck; benign  age 7     MAMMOPLASTY REDUCTION Bilateral      RELEASE CARPAL TUNNEL Bilateral      SIGMOIDOSCOPY FLEXIBLE N/A 1/10/2017    Procedure: SIGMOIDOSCOPY FLEXIBLE;  Surgeon: Annmarie Haynes MD;  [facility]     SIGMOIDOSCOPY FLEXIBLE N/A 5/8/2018    Procedure: SIGMOIDOSCOPY FLEXIBLE;  flex sig with foreign body removal using snare and rattooth forcep;  Surgeon: Soham Cano MD;  [facility]     SIGMOIDOSCOPY FLEXIBLE N/A 2/20/2019    Procedure: Exam under anesthesia  Colonoscopy with attempted  removal of rectal foreign body;  Surgeon: Estrada Chávez MD;  [facility]     Medications   Home Meds  Prior to Admission medications    Medication Sig Start Date End Date Taking? Authorizing Provider   acetaminophen (TYLENOL) 500 MG tablet Take 500-1,000 mg by mouth every 8 hours as needed for mild pain     Unknown, Entered By History   albuterol (PROAIR HFA/PROVENTIL HFA/VENTOLIN HFA) 108 (90 Base) MCG/ACT inhaler Inhale 2 puffs into the lungs every 4 hours as needed for shortness of breath / dyspnea or wheezing     Reported, Patient   busPIRone (BUSPAR) 15 MG tablet Take 1 tablet (15 mg) by mouth 2 times daily 8/19/20   Her, Julieth S   Cholecalciferol (VITAMIN D) 50 MCG (2000 UT) CAPS Take 2,000 Units by mouth daily     Unknown, Entered By History   cloNIDine (CATAPRES) 0.1 MG tablet Take 0.1 mg by mouth 2 times daily     Reported, Patient   desvenlafaxine (PRISTIQ) 100 MG 24 hr tablet Take 1 tablet (100 mg) by mouth every morning    Reported, Patient   hydroxychloroquine (PLAQUENIL) 200 MG tablet Take 200 mg by mouth 2 times daily    Reported, Patient   lurasidone (LATUDA) 60 MG TABS tablet Take 60 mg by mouth daily (with dinner)  12/4/18   Reported, Patient   metFORMIN (GLUCOPHAGE-XR) 500 MG 24 hr tablet Take 1,000 mg by mouth daily (with dinner)     Unknown, Entered By History   pregabalin (LYRICA) 50 MG capsule Take 50 mg by mouth 3 times daily    Reported, Patient   sennosides (SENOKOT) 8.6 MG tablet Take 1 tablet by mouth 2 times daily as needed for constipation     Unknown, Entered By History   valACYclovir (VALTREX) 1000 mg tablet Take 2 tablets by mouth two times daily for one day. Use as needed at onset of cold sore.     Unknown, Entered By History       Scheduled Meds    lidocaine  1 patch Transdermal Once       Infusion Meds      PRN Meds      Allergies   Allergies   Allergen Reactions     Amoxicillin-Pot Clavulanate Other (See Comments), Swelling and Rash     PN:  facial swelling, left side. Also had cortisone injection the same day as she started the Augmentin.  Noted in downtime recovery of chart.    PN: facial swelling, left side. Also had cortisone injection the same day as she started the Augmentin.; HUT Comment: PN: facial swelling, left side. Also had cortisone injection the same day as she started the Augmentin.Noted in downtime recovery of chart.; HUT Reaction: Rash; HUT Reaction: Unknown; HUT Reaction Type: Allergy; HUT Severity: Med; HUT Noted: [id number]     Oseltamivir Hives     med stopped, PN: med stopped  med stopped, PN: med stopped; HUT Comment: med stopped, PN: med stopped; HUT Reaction: Hives; HUT Reaction Type: Allergy; HUT Severity: Med; HUT Noted: [id number]     Penicillins Anaphylaxis     HUT Reaction: Anaphylaxis; HUT Reaction Type: Allergy; HUT Severity: High; HUT Noted: [id number]     Vancomycin Itching, Swelling and Rash     Other reaction(s): Redness  Flushed face, very itchy; HUT Comment: Flushed face, very itchy; HUT Reaction: Angioedema; HUT Reaction: Redness; HUT Severity: Med; HUT Noted: [id number]    facial     Hydrocodone Nausea and Vomiting and GI Disturbance     vomiting for days, PN: vomiting for days; HUT Comment: vomiting for days; HUT Reaction: Gastrointestinal; HUT Reaction: Nausea And Vomiting; HUT Reaction Type: Intolerance; HUT Severity: Med; HUT Noted: [id number]  vomiting for days       Blood-Group Specific Substance Other (See Comments)     Patient has an anti-Cw and non-specific antibodies. Blood product orders may be delayed. Draw one red top and two purple top tubes for all type/screen/crossmatch orders.  Patient has anti-Cw, anti-K (Angella), Warm auto and nonspecific antibodies. Blood products may be delayed. Draw patient 24 hours prior to transfusion. Draw one red top and two purple top tubes for all type and screen orders.     Oxycodone Swelling     Cephalosporins Rash     Influenza Vaccines Other (See Comments) and Nausea and  Vomiting     HUT Reaction: Nausea And Vomiting; HUT Reaction Type: Intolerance; HUT Severity: Low; HUT Noted: [id number]     Lamotrigine Rash     Possibly associated with Lamictal.   HUT Comment: Possibly associated with Lamictal. ; HUT Reaction: Rash; HUT Reaction Type: Allergy; HUT Severity: Low; HUT Noted: [id number]     Latex Rash     HUT Reaction: Rash; HUT Reaction Type: Allergy; HUT Severity: Low; HUT Noted: [id number]     Family History   Family History   Problem Relation Age of Onset     Diabetes Type 2  Maternal Grandmother      Diabetes Type 2  Paternal Grandmother      Breast Cancer Paternal Grandmother      Cerebrovascular Disease Father 53     Myocardial Infarction No family hx of      Coronary Artery Disease Early Onset No family hx of      Ovarian Cancer No family hx of      Colon Cancer No family hx of      Social History   Social History     Tobacco Use     Smoking status: Never Smoker     Smokeless tobacco: Never Used   Substance Use Topics     Alcohol use: No     Alcohol/week: 0.0 standard drinks     Drug use: No       Review of Systems   The 10 point Review of Systems is negative other than noted in the HPI or here.        PHYSICAL EXAMINATION  Pulse:  [94] 94  Resp:  [16] 16  BP: (133)/(82) 133/82  SpO2:  [99 %] 99 %     General:  patient lying in bed without any acute distress    HEENT:  normocephalic/atraumatic, no epistaxis   Cardio:  RRR  Pulmonary:  no respiratory distress  Abdomen:  soft  Extremities:  no edema  Skin:  intact, warm/dry, no jaundice       Neurologic  Mental Status:  alert, oriented to age and month, follows commands, speech clear and fluent, naming and repetition normal  Cranial Nerves:  visual fields intact, PERRL, EOMI with normal smooth pursuit, facial sensation intact and symmetric, facial movements symmetric, hearing not formally tested but intact to conversation, palate elevation symmetric and uvula midline, no dysarthria  Motor:  Slight drift on RUE prior to 10 seconds,  but does not hit the bed. RUE , strength is 3/5, elbow flexion and extension 4/5, shoulder abduction is 4/5; all appears to be pain limited. RLE strength is 4/5. LUE and LLE strength is 5/5.  Reflexes:  no clonus  Sensory:  decreased sensation to light touch and pinprick on RUE and RLE. Intact on left side.  Coordination:  FNF without dysmetria bilaterally.  Station/Gait:  deferred      Dysphagia Screen  Per Nursing    Stroke Scales    NIHSS  Interval     Interval Comments     1a. Level of Consciousness 0-->Alert, keenly responsive   1b. LOC Questions 0-->Answers both questions correctly   1c. LOC Commands 0-->Performs both tasks correctly   2.   Best Gaze 0-->Normal   3.   Visual 0-->No visual loss   4.   Facial Palsy 0-->Normal symmetrical movements   5a. Motor Arm, Left 0-->No drift, limb holds 90 (or 45) degrees for full 10 secs   5b. Motor Arm, Right 1-->Drift, limb holds 90 (or 45) degrees, but drifts down before full 10 secs, does not hit bed or other support   6a. Motor Leg, Left 0-->No drift, leg holds 30 degree position for full 5 secs   6b. Motor Leg, right 0-->No drift, leg holds 30 degree position for full 5 secs   7.   Limb Ataxia 0-->Absent   8.   Sensory 1-->Mild-to-moderate sensory loss, patient feels pinprick is less sharp or is dull on the affected side, or there is a loss of superficial pain with pinprick, but patient is aware of being touched   9.   Best Language 0-->No aphasia, normal   10. Dysarthria 0-->Normal   11. Extinction and Inattention  0-->No abnormality   Total 2 (01/10/21 2057)       Imaging  I personally reviewed all imaging; relevant findings per HPI.     Lab Results Data   CBC  No results for input(s): WBC, RBC, HGB, HCT, PLT in the last 168 hours.  Basic Metabolic Panel    No results for input(s): NA, POTASSIUM, CHLORIDE, CO2, BUN, CR, GLC, KELBY in the last 168 hours.  Liver Panel  No results for input(s): PROTTOTAL, ALBUMIN, BILITOTAL, ALKPHOS, AST, ALT, BILIDIRECT in the  last 168 hours.  INR    Recent Labs   Lab Test 11/07/20  1847 10/30/20  1956 09/07/20  1530   INR 0.97 1.06 1.04      Lipid Profile    Recent Labs   Lab Test 11/30/20  0823 03/21/18  0802   CHOL 130 132   HDL 44* 48*   LDL 50 59   TRIG 182* 125     A1C  No lab results found.  Troponin I  No results for input(s): TROPI in the last 168 hours.       Stroke Code / Stroke Consult Data Data   Stroke Code Data  (for stroke code without tele)  Stroke code activated 01/10/21   2014   First stroke provider response 01/10/21   2030   Last known normal 01/10/21   1900   Time of discovery   (or onset of symptoms) 01/10/21   1900   Head CT read by me         Was stroke code de-escalated? (Stroke code cancelled)

## 2021-01-11 NOTE — PHARMACY
Pharmacy Stroke Code Response  Pharmacist responded as part of the Stroke Code Team activation to patient care area ED.  The Stroke Team determined that the patient was not a candidate for IV alteplase therapy and the pharmacy team was dismissed at 2030.     Elda Connolly, AdolfoD, BCPS

## 2021-01-11 NOTE — ED TRIAGE NOTES
Patient BIBA from home. Per EMS, patient felt a pinch in her back around 1900 tonight and then her entire right side went numb and she had tingling from the shoulders down. No facial droop noted. Blood sugar 88 en route.

## 2021-01-11 NOTE — ED NOTES
Bed: ED03  Expected date:   Expected time:   Means of arrival:   Comments:  HE 30F Stroke Code right sided weakness BS 88. ETA 12 mins

## 2021-01-11 NOTE — ED NOTES
Stroke code de-escalated per ED MD and Stroke Neuro MD at 2050. No CT scan necessary at this time. Patient will be discharging home.

## 2021-02-07 ENCOUNTER — HOSPITAL ENCOUNTER (EMERGENCY)
Facility: CLINIC | Age: 31
Discharge: HOME | End: 2021-02-08
Attending: EMERGENCY MEDICINE | Admitting: EMERGENCY MEDICINE
Payer: COMMERCIAL

## 2021-02-07 DIAGNOSIS — S89.91XA KNEE INJURY, RIGHT, INITIAL ENCOUNTER: ICD-10-CM

## 2021-02-07 PROCEDURE — 99285 EMERGENCY DEPT VISIT HI MDM: CPT | Mod: 25 | Performed by: EMERGENCY MEDICINE

## 2021-02-07 PROCEDURE — 29505 APPLICATION LONG LEG SPLINT: CPT | Mod: RT | Performed by: EMERGENCY MEDICINE

## 2021-02-07 PROCEDURE — 99284 EMERGENCY DEPT VISIT MOD MDM: CPT | Mod: 25 | Performed by: EMERGENCY MEDICINE

## 2021-02-07 PROCEDURE — 99283 EMERGENCY DEPT VISIT LOW MDM: CPT | Mod: Z6 | Performed by: EMERGENCY MEDICINE

## 2021-02-08 ENCOUNTER — APPOINTMENT (OUTPATIENT)
Dept: GENERAL RADIOLOGY | Facility: CLINIC | Age: 31
End: 2021-02-08
Attending: EMERGENCY MEDICINE
Payer: COMMERCIAL

## 2021-02-08 VITALS
HEART RATE: 89 BPM | RESPIRATION RATE: 16 BRPM | SYSTOLIC BLOOD PRESSURE: 142 MMHG | OXYGEN SATURATION: 100 % | DIASTOLIC BLOOD PRESSURE: 66 MMHG | TEMPERATURE: 97.9 F

## 2021-02-08 PROCEDURE — 73560 X-RAY EXAM OF KNEE 1 OR 2: CPT | Mod: RT

## 2021-02-08 PROCEDURE — 73560 X-RAY EXAM OF KNEE 1 OR 2: CPT | Mod: 26 | Performed by: RADIOLOGY

## 2021-02-08 NOTE — ED TRIAGE NOTES
Pt presents to ER via EMS for right knee pain after falling and slipping out of the shower at about 2200 this evening. Pt states that she felt a pop and a rip after slipping. Pt states pain is 7/10 when not applying pressure. Pt currently in room 10 due to history of ingestion foreign objects. Will continue to monitor. Sitter placed for direct observation.

## 2021-02-08 NOTE — DISCHARGE INSTRUCTIONS
Please make an appointment to follow up with [facility] (phone: [phone]) in 7-14 days as needed.     Return to the emergency department if symptoms continue, get worse, there are any new symptoms or any cause for concern.

## 2021-02-08 NOTE — ED PROVIDER NOTES
[place] EMERGENCY DEPARTMENT ([facility])  2/07/21  History     Chief Complaint   Patient presents with     Knee Pain     HPI  Nevin Alvarado is a 30 year old female who presents to the Emergency Department for evaluation of knee pain.  Patient states that she was getting out of the shower when she had pain above her knee and her right anterior thigh.  She states she felt a pop with this.  This became worse after bearing weight.  Patient denies falling, did not strike her head or have other injuries.  Patient denies previous knee injuries or previous surgeries on that affected knee.  No other symptoms noted.      I have reviewed the Medications, Allergies, Past Medical and Surgical History, and Social History in the Rewarding Return system.  PAST MEDICAL HISTORY:   Past Medical History:   Diagnosis Date     ADD (attention deficit disorder)      Anorexia nervosa with bulimia     history of; on Topamax     Anxiety      Borderline personality disorder (H)      Depression      H/O self-harm      History of pulmonary embolism 12/2019    Provoked. Completed 3 month course of Apixaban     Morbid obesity (H)      Neuropathy      PTSD (post-traumatic stress disorder)      Rectal foreign body - Recurrent issue, self placed      Suicide attempt (H) 2/21/2018     Swallowed foreign body - Recurrent issue, self placed        PAST SURGICAL HISTORY:   Past Surgical History:   Procedure Laterality Date     COMBINED ESOPHAGOSCOPY, GASTROSCOPY, DUODENOSCOPY (EGD), REPLACE ESOPHAGEAL STENT N/A 10/9/2019    Procedure: Upper Endoscopy with Suture Placement;  Surgeon: Zurdo Ramirez MD;  Location:  OR     ESOPHAGOSCOPY, GASTROSCOPY, DUODENOSCOPY (EGD), COMBINED N/A 3/9/2017    Procedure: COMBINED ESOPHAGOSCOPY, GASTROSCOPY, DUODENOSCOPY (EGD), REMOVE FOREIGN BODY;  Surgeon: Avis Guzmán MD;  Location: [facility] OR     ESOPHAGOSCOPY, GASTROSCOPY, DUODENOSCOPY (EGD), COMBINED N/A 4/20/2017    Procedure: COMBINED  ESOPHAGOSCOPY, GASTROSCOPY, DUODENOSCOPY (EGD), REMOVE FOREIGN BODY;  EGD removal Foregin body;  Surgeon: Lokesh Paula MD;  Location: [facility] OR     ESOPHAGOSCOPY, GASTROSCOPY, DUODENOSCOPY (EGD), COMBINED N/A 6/12/2017    Procedure: COMBINED ESOPHAGOSCOPY, GASTROSCOPY, DUODENOSCOPY (EGD);  COMBINED ESOPHAGOSCOPY, GASTROSCOPY, DUODENOSCOPY (EGD) [[id number]]attempted removal of foreign body;  Surgeon: Pamela Perez MD;  [facility]     ESOPHAGOSCOPY, GASTROSCOPY, DUODENOSCOPY (EGD), COMBINED N/A 6/9/2017    Procedure: COMBINED ESOPHAGOSCOPY, GASTROSCOPY, DUODENOSCOPY (EGD), REMOVE FOREIGN BODY;  Esophagoscopy, Gastroscopy, Duodenoscopy, Removal of Foreign Body;  Surgeon: Dejon Marsh MD;  [facility]: [facility]     ESOPHAGOSCOPY, GASTROSCOPY, DUODENOSCOPY (EGD), COMBINED N/A 1/6/2018    Procedure: COMBINED ESOPHAGOSCOPY, GASTROSCOPY, DUODENOSCOPY (EGD), REMOVE FOREIGN BODY;  COMBINED ESOPHAGOSCOPY, GASTROSCOPY, DUODENOSCOPY (EGD) [by pascal net and snare with profol sedation;  Surgeon: Feliciano Emmanuel MD;  Location:  GI     ESOPHAGOSCOPY, GASTROSCOPY, DUODENOSCOPY (EGD), COMBINED N/A 3/19/2018    Procedure: COMBINED ESOPHAGOSCOPY, GASTROSCOPY, DUODENOSCOPY (EGD), REMOVE FOREIGN BODY;   Esophagodscopy, Gastroscopy, Duodenoscopy,Foreign Body Removal;  Surgeon: Brice Guzmán MD;  [facility]     ESOPHAGOSCOPY, GASTROSCOPY, DUODENOSCOPY (EGD), COMBINED N/A 4/16/2018    Procedure: COMBINED ESOPHAGOSCOPY, GASTROSCOPY, DUODENOSCOPY (EGD), REMOVE FOREIGN BODY;  Esophagogastroduodenoscopy  Foreign Body Removal X 2;  Surgeon: Royer Moise MD;  [facility]     ESOPHAGOSCOPY, GASTROSCOPY, DUODENOSCOPY (EGD), COMBINED N/A 6/1/2018    Procedure: COMBINED ESOPHAGOSCOPY, GASTROSCOPY, DUODENOSCOPY (EGD), REMOVE FOREIGN BODY;  COMBINED ESOPHAGOSCOPY, GASTROSCOPY, DUODENOSCOPY with removal of foreign body, propofol sedation by anesthesia;  Surgeon: Brice Martinez MD;   Location:  GI     ESOPHAGOSCOPY, GASTROSCOPY, DUODENOSCOPY (EGD), COMBINED N/A 7/25/2018    Procedure: COMBINED ESOPHAGOSCOPY, GASTROSCOPY, DUODENOSCOPY (EGD), REMOVE FOREIGN BODY;;  Surgeon: Candy Castelan MD;  Location:  GI     ESOPHAGOSCOPY, GASTROSCOPY, DUODENOSCOPY (EGD), COMBINED N/A 7/28/2018    Procedure: COMBINED ESOPHAGOSCOPY, GASTROSCOPY, DUODENOSCOPY (EGD), REMOVE FOREIGN BODY;  COMBINED ESOPHAGOSCOPY, GASTROSCOPY, DUODENOSCOPY (EGD), REMOVE FOREIGN BODY;  Surgeon: Brice Guzmán MD;  [facility]     ESOPHAGOSCOPY, GASTROSCOPY, DUODENOSCOPY (EGD), COMBINED N/A 7/31/2018    Procedure: COMBINED ESOPHAGOSCOPY, GASTROSCOPY, DUODENOSCOPY (EGD);  COMBINED ESOPHAGOSCOPY, GASTROSCOPY, DUODENOSCOPY (EGD) TO REMOVE FOREIGN BODY;  Surgeon: Lokesh Paula MD;  [facility]     ESOPHAGOSCOPY, GASTROSCOPY, DUODENOSCOPY (EGD), COMBINED N/A 8/4/2018    Procedure: COMBINED ESOPHAGOSCOPY, GASTROSCOPY, DUODENOSCOPY (EGD), REMOVE FOREIGN BODY;   combined esophagoscopy, gastroscopy, duodenoscopy, REMOVE FOREIGN BODY ;  Surgeon: Lokesh Paula MD;  [facility]     ESOPHAGOSCOPY, GASTROSCOPY, DUODENOSCOPY (EGD), COMBINED N/A 10/6/2019    Procedure: ESOPHAGOGASTRODUODENOSCOPY (EGD) with fireign body removal x2, esophageal stent placement with floroscopy;  Surgeon: Timoteo Espana MD;  [facility]     ESOPHAGOSCOPY, GASTROSCOPY, DUODENOSCOPY (EGD), COMBINED N/A 12/2/2019    Procedure: Esophagogastroduodenoscopy with esophageal stent removal, esophogram;  Surgeon: Kailee Tena MD;  [facility]     ESOPHAGOSCOPY, GASTROSCOPY, DUODENOSCOPY (EGD), COMBINED N/A 12/17/2019    Procedure: ESOPHAGOGASTRODUODENOSCOPY, WITH FOREIGN BODY REMOVAL;  Surgeon: Pamela Perez MD;  [facility]     ESOPHAGOSCOPY, GASTROSCOPY, DUODENOSCOPY (EGD), COMBINED N/A 12/13/2019    Procedure: ESOPHAGOGASTRODUODENOSCOPY, WITH FOREIGN BODY REMOVAL;  Surgeon: Samia Stanton MD;   [facility]     ESOPHAGOSCOPY, GASTROSCOPY, DUODENOSCOPY (EGD), COMBINED N/A 12/28/2019    Procedure: ESOPHAGOGASTRODUODENOSCOPY (EGD) Removal of Foreign Body X 2;  Surgeon: Huy Kelley MD;  [facility]     ESOPHAGOSCOPY, GASTROSCOPY, DUODENOSCOPY (EGD), COMBINED N/A 1/5/2020    Procedure: ESOPHAGOGASTRODUOENOSCOPY WITH FOREIGN BODY REMOVAL;  Surgeon: Pamela Perez MD;  [facility]     ESOPHAGOSCOPY, GASTROSCOPY, DUODENOSCOPY (EGD), COMBINED N/A 1/3/2020    Procedure: ESOPHAGOGASTRODUODENOSCOPY (EGD) REMOVAL OF FOREIGN BODY.;  Surgeon: Pamela Perez MD;  [facility]     ESOPHAGOSCOPY, GASTROSCOPY, DUODENOSCOPY (EGD), COMBINED N/A 1/13/2020    Procedure: ESOPHAGOGASTRODUODENOSCOPY (EGD) for foreign body removal;  Surgeon: Lokesh Paula MD;  [facility]     ESOPHAGOSCOPY, GASTROSCOPY, DUODENOSCOPY (EGD), COMBINED N/A 1/18/2020    Procedure: Diagnostic ESOPHAGOGASTRODUODENOSCOPY (EGD;  Surgeon: Lokesh Paula MD;  [facility]     ESOPHAGOSCOPY, GASTROSCOPY, DUODENOSCOPY (EGD), COMBINED N/A 3/29/2020    Procedure: UPPER ENDOSCOPY WITH FOREIGN BODY REMOVAL;  Surgeon: Doug Hansen MD;  [facility]     ESOPHAGOSCOPY, GASTROSCOPY, DUODENOSCOPY (EGD), COMBINED N/A 7/11/2020    Procedure: ESOPHAGOGASTRODUODENOSCOPY (EGD); Upper Endoscopy WITH FOREIGN BODY REMOVAL;  Surgeon: Lyndsey Mendoza DO;  [facility]     ESOPHAGOSCOPY, GASTROSCOPY, DUODENOSCOPY (EGD), COMBINED N/A 7/29/2020    Procedure: ESOPHAGOGASTRODUODENOSCOPY REMOVAL OF FOREIGN BODY;  Surgeon: Samia Stanton MD;  [facility]     ESOPHAGOSCOPY, GASTROSCOPY, DUODENOSCOPY (EGD), COMBINED N/A 8/1/2020    Procedure: ESOPHAGOGASTRODUODENOSCOPY, WITH FOREIGN BODY REMOVAL;  Surgeon: Pamela Perez MD;  [facility]     ESOPHAGOSCOPY, GASTROSCOPY, DUODENOSCOPY (EGD), COMBINED N/A 8/18/2020    Procedure: ESOPHAGOGASTRODUODENOSCOPY (EGD) for foreign body  removal;  Surgeon: Pamela Perez MD;  [facility]     ESOPHAGOSCOPY, GASTROSCOPY, DUODENOSCOPY (EGD), COMBINED N/A 8/27/2020    Procedure: ESOPHAGOGASTRODUODENOSCOPY (EGD) with foreign body removal;  Surgeon: Campbell Rogers MD;  [facility]     ESOPHAGOSCOPY, GASTROSCOPY, DUODENOSCOPY (EGD), COMBINED N/A 9/18/2020    Procedure: ESOPHAGOGASTRODUODENOSCOPY (EGD) with foreign body removal;  Surgeon: Dick Gillis MD;  [facility]     ESOPHAGOSCOPY, GASTROSCOPY, DUODENOSCOPY (EGD), COMBINED N/A 11/18/2020    Procedure: ESOPHAGOGASTRODUODENOSCOPY, WITH FOREIGN BODY REMOVAL;  Surgeon: Felipe Ulloa DO;  [facility]     ESOPHAGOSCOPY, GASTROSCOPY, DUODENOSCOPY (EGD), COMBINED N/A 11/28/2020    Procedure: ESOPHAGOGASTRODUODENOSCOPY (EGD);  Surgeon: Campbell Rogers MD;  [facility]     EXAM UNDER ANESTHESIA ANUS N/A 1/10/2017    Procedure: EXAM UNDER ANESTHESIA ANUS;  Surgeon: Annmarie Haynes MD;  [facility]     EXAM UNDER ANESTHESIA RECTUM N/A 7/19/2018    Procedure: EXAM UNDER ANESTHESIA RECTUM;  EXAM UNDER ANESTHESIA, REMOVAL OF RECTAL FOREIGN BODY;  Surgeon: Annmarie Haynes MD;  [facility]     HC REMOVE FECAL IMPACTION OR FB W ANESTHESIA N/A 12/18/2016    Procedure: REMOVE FECAL IMPACTION/FOREIGN BODY UNDER ANESTHESIA;  Surgeon: Soham Cano MD;  Location: [facility] OR     KNEE SURGERY - removed a small tissue mass from knee Right      LAPAROSCOPIC ABLATION ENDOMETRIOSIS       LAPAROTOMY EXPLORATORY N/A 1/10/2017    Procedure: LAPAROTOMY EXPLORATORY;  Surgeon: Annmarie Haynes MD;  [facility]     LAPAROTOMY EXPLORATORY  09/11/2019    Manual manipulation of bowel to remove pill bottle in rectum     lymph nodes removed from neck; benign  age 7     MAMMOPLASTY REDUCTION Bilateral      RELEASE CARPAL TUNNEL Bilateral      SIGMOIDOSCOPY FLEXIBLE N/A 1/10/2017    Procedure: SIGMOIDOSCOPY FLEXIBLE;  Surgeon:  Annmarie Haynes MD;  [facility]     SIGMOIDOSCOPY FLEXIBLE N/A 5/8/2018    Procedure: SIGMOIDOSCOPY FLEXIBLE;  flex sig with foreign body removal using snare and rattooth forcep;  Surgeon: Soham Cano MD;  Location:  GI     SIGMOIDOSCOPY FLEXIBLE N/A 2/20/2019    Procedure: Exam under anesthesia Colonoscopy with attempted  removal of rectal foreign body;  Surgeon: Estrada Chávez MD;  [facility]: [facility]       Past medical history, past surgical history, medications, and allergies were reviewed with the patient. Additional pertinent items: None    FAMILY HISTORY:   Family History   Problem Relation Age of Onset     Diabetes Type 2  Maternal Grandmother      Diabetes Type 2  Paternal Grandmother      Breast Cancer Paternal Grandmother      Cerebrovascular Disease Father 53     Myocardial Infarction No family hx of      Coronary Artery Disease Early Onset No family hx of      Ovarian Cancer No family hx of      Colon Cancer No family hx of        SOCIAL HISTORY:   Social History     Tobacco Use     Smoking status: Never Smoker     Smokeless tobacco: Never Used   Substance Use Topics     Alcohol use: No     Alcohol/week: 0.0 standard drinks     Social history was reviewed with the patient. Additional pertinent items: None      Patient's Medications   New Prescriptions    No medications on file   Previous Medications    ACETAMINOPHEN (TYLENOL) 500 MG TABLET    Take 500-1,000 mg by mouth every 8 hours as needed for mild pain     ALBUTEROL (PROAIR HFA/PROVENTIL HFA/VENTOLIN HFA) 108 (90 BASE) MCG/ACT INHALER    Inhale 2 puffs into the lungs every 4 hours as needed for shortness of breath / dyspnea or wheezing     BUSPIRONE (BUSPAR) 15 MG TABLET    Take 1 tablet (15 mg) by mouth 2 times daily    CHOLECALCIFEROL (VITAMIN D) 50 MCG (2000 UT) CAPS    Take 2,000 Units by mouth daily     CLONIDINE (CATAPRES) 0.1 MG TABLET    Take 0.1 mg by mouth 2 times daily     DESVENLAFAXINE (PRISTIQ) 100 MG 24 HR TABLET     Take 1 tablet (100 mg) by mouth every morning    HYDROXYCHLOROQUINE (PLAQUENIL) 200 MG TABLET    Take 200 mg by mouth 2 times daily    LURASIDONE (LATUDA) 60 MG TABS TABLET    Take 60 mg by mouth daily (with dinner)     METFORMIN (GLUCOPHAGE-XR) 500 MG 24 HR TABLET    Take 1,000 mg by mouth daily (with dinner)     PREGABALIN (LYRICA) 50 MG CAPSULE    Take 50 mg by mouth 3 times daily    SENNOSIDES (SENOKOT) 8.6 MG TABLET    Take 1 tablet by mouth 2 times daily as needed for constipation     VALACYCLOVIR (VALTREX) 1000 MG TABLET    Take 2 tablets by mouth two times daily for one day. Use as needed at onset of cold sore.    Modified Medications    No medications on file   Discontinued Medications    No medications on file          Allergies   Allergen Reactions     Amoxicillin-Pot Clavulanate Other (See Comments), Swelling and Rash     PN: facial swelling, left side. Also had cortisone injection the same day as she started the Augmentin.  Noted in downtime recovery of chart.    PN: facial swelling, left side. Also had cortisone injection the same day as she started the Augmentin.; HUT Comment: PN: facial swelling, left side. Also had cortisone injection the same day as she started the Augmentin.Noted in downtime recovery of chart.; HUT Reaction: Rash; HUT Reaction: Unknown; HUT Reaction Type: Allergy; HUT Severity: Med; HUT Noted: [id number]     Oseltamivir Hives     med stopped, PN: med stopped  med stopped, PN: med stopped; HUT Comment: med stopped, PN: med stopped; HUT Reaction: Hives; HUT Reaction Type: Allergy; HUT Severity: Med; HUT Noted: [id number]     Penicillins Anaphylaxis     HUT Reaction: Anaphylaxis; HUT Reaction Type: Allergy; HUT Severity: High; HUT Noted: [id number]     Vancomycin Itching, Swelling and Rash     Other reaction(s): Redness  Flushed face, very itchy; HUT Comment: Flushed face, very itchy; HUT Reaction: Angioedema; HUT Reaction: Redness; HUT Severity: Med; HUT Noted: [id number]    facial      Hydrocodone Nausea and Vomiting and GI Disturbance     vomiting for days, PN: vomiting for days; HUT Comment: vomiting for days; HUT Reaction: Gastrointestinal; HUT Reaction: Nausea And Vomiting; HUT Reaction Type: Intolerance; HUT Severity: Med; HUT Noted: [id number]  vomiting for days       Blood-Group Specific Substance Other (See Comments)     Patient has an anti-Cw and non-specific antibodies. Blood product orders may be delayed. Draw one red top and two purple top tubes for all type/screen/crossmatch orders.  Patient has anti-Cw, anti-K (Angella), Warm auto and nonspecific antibodies. Blood products may be delayed. Draw patient 24 hours prior to transfusion. Draw one red top and two purple top tubes for all type and screen orders.     Oxycodone Swelling     Cephalosporins Rash     Influenza Vaccines Other (See Comments) and Nausea and Vomiting     HUT Reaction: Nausea And Vomiting; HUT Reaction Type: Intolerance; HUT Severity: Low; HUT Noted: [id number]     Lamotrigine Rash     Possibly associated with Lamictal.   HUT Comment: Possibly associated with Lamictal. ; HUT Reaction: Rash; HUT Reaction Type: Allergy; HUT Severity: Low; HUT Noted: [id number]     Latex Rash     HUT Reaction: Rash; HUT Reaction Type: Allergy; HUT Severity: Low; HUT Noted: [id number]        Review of Systems  A complete review of systems was performed with pertinent positives and negatives noted in the HPI, and all other systems negative.    Physical Exam          Physical Exam  Constitutional:       General: She is not in acute distress.     Appearance: She is not diaphoretic.   HENT:      Head: Atraumatic.      Mouth/Throat:      Pharynx: No oropharyngeal exudate.   Eyes:      General: No scleral icterus.     Pupils: Pupils are equal, round, and reactive to light.   Cardiovascular:      Rate and Rhythm: Normal rate.   Pulmonary:      Effort: No respiratory distress.   Abdominal:      Tenderness: There is no abdominal tenderness.    Musculoskeletal:         General: Tenderness present. No swelling or deformity.        Legs:       Comments: Distal pulse, sensation and movement intact in right lower extremity.   Skin:     General: Skin is warm.      Findings: No rash.         ED Course     ED Course as of Feb 08 0251   Mon Feb 08, 2021   0054 XR Knee Right 1/2 Views     Procedures                           No results found for this or any previous visit (from the past 24 hour(s)).  Medications - No data to display          Assessments & Plan (with Medical Decision Making)   This is a 30-year-old female who presents after a knee injury.  This occurred while stepping out of the shower.  She notes pain on her right anterior thigh just above the knee.  On exam she has tenderness at this site.  There is no effusion or tenderness along the joint line of the knee.  She is neurovascularly intact distally.  X-ray shows no acute abnormalities.  I discussed all results with patient.  Symptoms could be due to a tendon or ligamentous injury.  We will provide a referral for Orthopedic Surgery if needed.  Patient was placed in a knee immobilizer and given crutches.  Patient will be discharged home with return precautions.    I have reviewed the nursing notes.    I have reviewed the findings, diagnosis, plan and need for follow up with the patient.    New Prescriptions    No medications on file       Final diagnoses:   None       2/7/2021   [facility] EMERGENCY DEPARTMENT     Dejon Rodriguez,   02/08/21 0256

## 2021-02-08 NOTE — ED NOTES
Bed: ED17  Expected date:   Expected time:   Means of arrival:   Comments:  [facility], [place], operated by [facility]  30/F  Slipped getting out of shower,felt pop in knee  Only hurts when standing on it  Yellow, ETA [id number]

## 2021-02-16 ENCOUNTER — AMBULATORY - HEALTHEAST (OUTPATIENT)
Dept: OTHER | Facility: CLINIC | Age: 31
End: 2021-02-16

## 2021-02-21 ENCOUNTER — HOSPITAL ENCOUNTER (EMERGENCY)
Facility: CLINIC | Age: 31
Discharge: LEFT BEFORE DISCHARGE | End: 2021-02-21
Admitting: EMERGENCY MEDICINE
Payer: COMMERCIAL

## 2021-02-21 VITALS
OXYGEN SATURATION: 97 % | SYSTOLIC BLOOD PRESSURE: 158 MMHG | DIASTOLIC BLOOD PRESSURE: 70 MMHG | TEMPERATURE: 98.7 F | RESPIRATION RATE: 16 BRPM | HEART RATE: 97 BPM

## 2021-02-21 PROCEDURE — 250N000013 HC RX MED GY IP 250 OP 250 PS 637: Performed by: EMERGENCY MEDICINE

## 2021-02-21 PROCEDURE — 250N000011 HC RX IP 250 OP 636: Performed by: EMERGENCY MEDICINE

## 2021-02-21 PROCEDURE — 999N000104 HC STATISTIC NO CHARGE

## 2021-02-21 RX ORDER — ACETAMINOPHEN 325 MG/1
975 TABLET ORAL ONCE
Status: COMPLETED | OUTPATIENT
Start: 2021-02-21 | End: 2021-02-21

## 2021-02-21 RX ORDER — ONDANSETRON 4 MG/1
4 TABLET, ORALLY DISINTEGRATING ORAL ONCE
Status: COMPLETED | OUTPATIENT
Start: 2021-02-21 | End: 2021-02-21

## 2021-02-21 RX ADMIN — ACETAMINOPHEN 975 MG: 325 TABLET, FILM COATED ORAL at 20:07

## 2021-02-21 RX ADMIN — ONDANSETRON 4 MG: 4 TABLET, ORALLY DISINTEGRATING ORAL at 20:07

## 2021-02-22 NOTE — ED TRIAGE NOTES
Patient BIBA from home. Reports cleaning ears after shower earlier today; then started having pain and now cannot hear out of RIGHT ear. Reports dizziness and headache associated with ear pain.

## 2021-02-24 ASSESSMENT — MIFFLIN-ST. JEOR: SCORE: 1966.47

## 2021-02-25 ENCOUNTER — SURGERY - HEALTHEAST (OUTPATIENT)
Dept: GASTROENTEROLOGY | Facility: CLINIC | Age: 31
End: 2021-02-25

## 2021-03-12 ENCOUNTER — HOSPITAL ENCOUNTER (EMERGENCY)
Facility: CLINIC | Age: 31
Discharge: HOME | End: 2021-03-13
Attending: EMERGENCY MEDICINE | Admitting: EMERGENCY MEDICINE
Payer: COMMERCIAL

## 2021-03-12 ENCOUNTER — APPOINTMENT (OUTPATIENT)
Dept: CT IMAGING | Facility: CLINIC | Age: 31
End: 2021-03-12
Attending: EMERGENCY MEDICINE
Payer: COMMERCIAL

## 2021-03-12 ENCOUNTER — APPOINTMENT (OUTPATIENT)
Dept: GENERAL RADIOLOGY | Facility: CLINIC | Age: 31
End: 2021-03-12
Attending: EMERGENCY MEDICINE
Payer: COMMERCIAL

## 2021-03-12 DIAGNOSIS — T18.9XXD SWALLOWED FOREIGN BODY, SUBSEQUENT ENCOUNTER: ICD-10-CM

## 2021-03-12 DIAGNOSIS — T18.2XXA FOREIGN BODY IN STOMACH, INITIAL ENCOUNTER: ICD-10-CM

## 2021-03-12 DIAGNOSIS — T18.9XXA SWALLOWED FOREIGN BODY, INITIAL ENCOUNTER: ICD-10-CM

## 2021-03-12 DIAGNOSIS — G89.18 POSTOPERATIVE PAIN: ICD-10-CM

## 2021-03-12 DIAGNOSIS — F41.9 ANXIETY DISORDER, UNSPECIFIED TYPE: Primary | ICD-10-CM

## 2021-03-12 PROCEDURE — 250N000011 HC RX IP 250 OP 636: Performed by: INTERNAL MEDICINE

## 2021-03-12 PROCEDURE — 43247 EGD REMOVE FOREIGN BODY: CPT | Performed by: INTERNAL MEDICINE

## 2021-03-12 PROCEDURE — G0500 MOD SEDAT ENDO SERVICE >5YRS: HCPCS | Performed by: INTERNAL MEDICINE

## 2021-03-12 PROCEDURE — 250N000013 HC RX MED GY IP 250 OP 250 PS 637: Performed by: EMERGENCY MEDICINE

## 2021-03-12 PROCEDURE — 74176 CT ABD & PELVIS W/O CONTRAST: CPT

## 2021-03-12 PROCEDURE — 43247 EGD REMOVE FOREIGN BODY: CPT

## 2021-03-12 PROCEDURE — 250N000011 HC RX IP 250 OP 636

## 2021-03-12 PROCEDURE — 99285 EMERGENCY DEPT VISIT HI MDM: CPT | Mod: 25

## 2021-03-12 PROCEDURE — 74018 RADEX ABDOMEN 1 VIEW: CPT

## 2021-03-12 RX ORDER — HYDROMORPHONE HYDROCHLORIDE 2 MG/1
2 TABLET ORAL ONCE
Status: COMPLETED | OUTPATIENT
Start: 2021-03-12 | End: 2021-03-12

## 2021-03-12 RX ORDER — FENTANYL CITRATE 50 UG/ML
25-50 INJECTION, SOLUTION INTRAMUSCULAR; INTRAVENOUS EVERY 5 MIN PRN
Status: DISCONTINUED | OUTPATIENT
Start: 2021-03-12 | End: 2021-03-13 | Stop reason: HOSPADM

## 2021-03-12 RX ORDER — FENTANYL CITRATE 50 UG/ML
INJECTION, SOLUTION INTRAMUSCULAR; INTRAVENOUS
Status: DISCONTINUED
Start: 2021-03-12 | End: 2021-03-13 | Stop reason: HOSPADM

## 2021-03-12 RX ORDER — FENTANYL CITRATE 50 UG/ML
50 INJECTION, SOLUTION INTRAMUSCULAR; INTRAVENOUS
Status: COMPLETED | OUTPATIENT
Start: 2021-03-12 | End: 2021-03-12

## 2021-03-12 RX ORDER — DIBUCAINE 0.28 G/28G
OINTMENT TOPICAL ONCE
Status: COMPLETED | OUTPATIENT
Start: 2021-03-12 | End: 2021-03-12

## 2021-03-12 RX ORDER — DIPHENHYDRAMINE HYDROCHLORIDE 50 MG/ML
INJECTION INTRAMUSCULAR; INTRAVENOUS
Status: COMPLETED
Start: 2021-03-12 | End: 2021-03-12

## 2021-03-12 RX ORDER — DIPHENHYDRAMINE HCL 25 MG
25 CAPSULE ORAL EVERY 6 HOURS PRN
Status: DISCONTINUED | OUTPATIENT
Start: 2021-03-12 | End: 2021-03-13 | Stop reason: HOSPADM

## 2021-03-12 RX ADMIN — MIDAZOLAM 1 MG: 1 INJECTION INTRAMUSCULAR; INTRAVENOUS at 23:58

## 2021-03-12 RX ADMIN — MIDAZOLAM 1 MG: 1 INJECTION INTRAMUSCULAR; INTRAVENOUS at 11:57

## 2021-03-12 RX ADMIN — FENTANYL CITRATE 50 MCG: 50 INJECTION, SOLUTION INTRAMUSCULAR; INTRAVENOUS at 23:57

## 2021-03-12 RX ADMIN — DIBUCAINE: 10 OINTMENT TOPICAL at 22:14

## 2021-03-12 RX ADMIN — HYDROMORPHONE HYDROCHLORIDE 2 MG: 2 TABLET ORAL at 20:15

## 2021-03-12 RX ADMIN — DIPHENHYDRAMINE HYDROCHLORIDE 50 MG: 50 INJECTION INTRAMUSCULAR; INTRAVENOUS at 23:51

## 2021-03-12 RX ADMIN — FENTANYL CITRATE 50 MCG: 50 INJECTION, SOLUTION INTRAMUSCULAR; INTRAVENOUS at 23:58

## 2021-03-12 ASSESSMENT — ENCOUNTER SYMPTOMS
ANAL BLEEDING: 1
ABDOMINAL PAIN: 1

## 2021-03-13 ENCOUNTER — APPOINTMENT (OUTPATIENT)
Dept: ULTRASOUND IMAGING | Facility: CLINIC | Age: 31
End: 2021-03-13
Attending: EMERGENCY MEDICINE
Payer: COMMERCIAL

## 2021-03-13 ENCOUNTER — APPOINTMENT (OUTPATIENT)
Dept: CT IMAGING | Facility: CLINIC | Age: 31
End: 2021-03-13
Attending: EMERGENCY MEDICINE
Payer: COMMERCIAL

## 2021-03-13 VITALS
DIASTOLIC BLOOD PRESSURE: 72 MMHG | HEART RATE: 98 BPM | RESPIRATION RATE: 18 BRPM | OXYGEN SATURATION: 100 % | TEMPERATURE: 97 F | SYSTOLIC BLOOD PRESSURE: 133 MMHG

## 2021-03-13 VITALS
HEART RATE: 89 BPM | TEMPERATURE: 98 F | DIASTOLIC BLOOD PRESSURE: 92 MMHG | RESPIRATION RATE: 10 BRPM | OXYGEN SATURATION: 100 % | SYSTOLIC BLOOD PRESSURE: 150 MMHG

## 2021-03-13 DIAGNOSIS — R51.9 HEADACHE, UNSPECIFIED HEADACHE TYPE: ICD-10-CM

## 2021-03-13 DIAGNOSIS — R07.81 PLEURITIC CHEST PAIN: ICD-10-CM

## 2021-03-13 LAB
ANION GAP SERPL CALCULATED.3IONS-SCNC: 7 MMOL/L (ref 3–14)
BASOPHILS # BLD AUTO: 0.1 10E9/L (ref 0–0.2)
BASOPHILS NFR BLD AUTO: 1.1 %
BUN SERPL-MCNC: 10 MG/DL (ref 7–30)
CALCIUM SERPL-MCNC: 9 MG/DL (ref 8.5–10.1)
CHLORIDE SERPL-SCNC: 108 MMOL/L (ref 94–109)
CO2 SERPL-SCNC: 26 MMOL/L (ref 20–32)
CREAT SERPL-MCNC: 0.62 MG/DL (ref 0.52–1.04)
D DIMER PPP FEU-MCNC: 1.2 UG/ML FEU (ref 0–0.5)
DIFFERENTIAL METHOD BLD: ABNORMAL
EOSINOPHIL # BLD AUTO: 1 10E9/L (ref 0–0.7)
EOSINOPHIL NFR BLD AUTO: 9.1 %
ERYTHROCYTE [DISTWIDTH] IN BLOOD BY AUTOMATED COUNT: 15.1 % (ref 10–15)
FLUAV RNA RESP QL NAA+PROBE: NEGATIVE
FLUBV RNA RESP QL NAA+PROBE: NEGATIVE
GFR SERPL CREATININE-BSD FRML MDRD: >90 ML/MIN/{1.73_M2}
GLUCOSE SERPL-MCNC: 97 MG/DL (ref 70–99)
HCT VFR BLD AUTO: 34.7 % (ref 35–47)
HGB BLD-MCNC: 10.9 G/DL (ref 11.7–15.7)
IMM GRANULOCYTES # BLD: 0.1 10E9/L (ref 0–0.4)
IMM GRANULOCYTES NFR BLD: 0.7 %
INTERPRETATION ECG - MUSE: NORMAL
LABORATORY COMMENT REPORT: NORMAL
LYMPHOCYTES # BLD AUTO: 2 10E9/L (ref 0.8–5.3)
LYMPHOCYTES NFR BLD AUTO: 18.6 %
MCH RBC QN AUTO: 26.8 PG (ref 26.5–33)
MCHC RBC AUTO-ENTMCNC: 31.4 G/DL (ref 31.5–36.5)
MCV RBC AUTO: 85 FL (ref 78–100)
MONOCYTES # BLD AUTO: 0.7 10E9/L (ref 0–1.3)
MONOCYTES NFR BLD AUTO: 6.5 %
NEUTROPHILS # BLD AUTO: 6.9 10E9/L (ref 1.6–8.3)
NEUTROPHILS NFR BLD AUTO: 64 %
NRBC # BLD AUTO: 0 10*3/UL
NRBC BLD AUTO-RTO: 0 /100
PLATELET # BLD AUTO: 299 10E9/L (ref 150–450)
POTASSIUM SERPL-SCNC: 3.9 MMOL/L (ref 3.4–5.3)
RBC # BLD AUTO: 4.07 10E12/L (ref 3.8–5.2)
RSV RNA SPEC QL NAA+PROBE: NEGATIVE
SARS-COV-2 RNA RESP QL NAA+PROBE: NEGATIVE
SODIUM SERPL-SCNC: 141 MMOL/L (ref 133–144)
SPECIMEN SOURCE: NORMAL
TROPONIN I SERPL-MCNC: <0.015 UG/L (ref 0–0.04)
UPPER GI ENDOSCOPY: NORMAL
WBC # BLD AUTO: 10.7 10E9/L (ref 4–11)

## 2021-03-13 PROCEDURE — 85025 COMPLETE CBC W/AUTO DIFF WBC: CPT | Performed by: EMERGENCY MEDICINE

## 2021-03-13 PROCEDURE — 250N000011 HC RX IP 250 OP 636: Performed by: EMERGENCY MEDICINE

## 2021-03-13 PROCEDURE — 71275 CT ANGIOGRAPHY CHEST: CPT

## 2021-03-13 PROCEDURE — 99285 EMERGENCY DEPT VISIT HI MDM: CPT | Mod: 25 | Performed by: EMERGENCY MEDICINE

## 2021-03-13 PROCEDURE — 250N000013 HC RX MED GY IP 250 OP 250 PS 637: Performed by: EMERGENCY MEDICINE

## 2021-03-13 PROCEDURE — 258N000003 HC RX IP 258 OP 636: Performed by: EMERGENCY MEDICINE

## 2021-03-13 PROCEDURE — 87636 SARSCOV2 & INF A&B AMP PRB: CPT | Performed by: EMERGENCY MEDICINE

## 2021-03-13 PROCEDURE — 250N000011 HC RX IP 250 OP 636

## 2021-03-13 PROCEDURE — 96374 THER/PROPH/DIAG INJ IV PUSH: CPT | Mod: 59 | Performed by: EMERGENCY MEDICINE

## 2021-03-13 PROCEDURE — 93005 ELECTROCARDIOGRAM TRACING: CPT | Performed by: EMERGENCY MEDICINE

## 2021-03-13 PROCEDURE — 80048 BASIC METABOLIC PNL TOTAL CA: CPT | Performed by: EMERGENCY MEDICINE

## 2021-03-13 PROCEDURE — 96375 TX/PRO/DX INJ NEW DRUG ADDON: CPT | Mod: 59 | Performed by: EMERGENCY MEDICINE

## 2021-03-13 PROCEDURE — 84484 ASSAY OF TROPONIN QUANT: CPT | Performed by: EMERGENCY MEDICINE

## 2021-03-13 PROCEDURE — C9803 HOPD COVID-19 SPEC COLLECT: HCPCS | Performed by: EMERGENCY MEDICINE

## 2021-03-13 PROCEDURE — 71275 CT ANGIOGRAPHY CHEST: CPT | Mod: 26 | Performed by: RADIOLOGY

## 2021-03-13 PROCEDURE — 85379 FIBRIN DEGRADATION QUANT: CPT | Performed by: EMERGENCY MEDICINE

## 2021-03-13 PROCEDURE — 93010 ELECTROCARDIOGRAM REPORT: CPT | Performed by: EMERGENCY MEDICINE

## 2021-03-13 PROCEDURE — 93970 EXTREMITY STUDY: CPT | Mod: 26 | Performed by: RADIOLOGY

## 2021-03-13 PROCEDURE — 250N000009 HC RX 250

## 2021-03-13 PROCEDURE — 96361 HYDRATE IV INFUSION ADD-ON: CPT | Performed by: EMERGENCY MEDICINE

## 2021-03-13 PROCEDURE — 93970 EXTREMITY STUDY: CPT

## 2021-03-13 RX ORDER — FLUMAZENIL 0.1 MG/ML
0.2 INJECTION, SOLUTION INTRAVENOUS
Status: DISCONTINUED | OUTPATIENT
Start: 2021-03-13 | End: 2021-03-13 | Stop reason: HOSPADM

## 2021-03-13 RX ORDER — PROCHLORPERAZINE MALEATE 10 MG
10 TABLET ORAL EVERY 6 HOURS PRN
Status: DISCONTINUED | OUTPATIENT
Start: 2021-03-13 | End: 2021-03-13 | Stop reason: HOSPADM

## 2021-03-13 RX ORDER — KETOROLAC TROMETHAMINE 15 MG/ML
15 INJECTION, SOLUTION INTRAMUSCULAR; INTRAVENOUS ONCE
Status: COMPLETED | OUTPATIENT
Start: 2021-03-13 | End: 2021-03-13

## 2021-03-13 RX ORDER — ONDANSETRON 2 MG/ML
4 INJECTION INTRAMUSCULAR; INTRAVENOUS
Status: DISCONTINUED | OUTPATIENT
Start: 2021-03-13 | End: 2021-03-13 | Stop reason: HOSPADM

## 2021-03-13 RX ORDER — ONDANSETRON 4 MG/1
4 TABLET, ORALLY DISINTEGRATING ORAL EVERY 6 HOURS PRN
Status: DISCONTINUED | OUTPATIENT
Start: 2021-03-13 | End: 2021-03-13 | Stop reason: HOSPADM

## 2021-03-13 RX ORDER — NALOXONE HYDROCHLORIDE 0.4 MG/ML
0.2 INJECTION, SOLUTION INTRAMUSCULAR; INTRAVENOUS; SUBCUTANEOUS
Status: DISCONTINUED | OUTPATIENT
Start: 2021-03-13 | End: 2021-03-13 | Stop reason: HOSPADM

## 2021-03-13 RX ORDER — LIDOCAINE 40 MG/G
CREAM TOPICAL
Status: DISCONTINUED | OUTPATIENT
Start: 2021-03-13 | End: 2021-03-13 | Stop reason: HOSPADM

## 2021-03-13 RX ORDER — LIDOCAINE 4 G/G
1 PATCH TOPICAL ONCE
Status: DISCONTINUED | OUTPATIENT
Start: 2021-03-13 | End: 2021-03-14 | Stop reason: HOSPADM

## 2021-03-13 RX ORDER — ONDANSETRON 2 MG/ML
4 INJECTION INTRAMUSCULAR; INTRAVENOUS EVERY 6 HOURS PRN
Status: DISCONTINUED | OUTPATIENT
Start: 2021-03-13 | End: 2021-03-13 | Stop reason: HOSPADM

## 2021-03-13 RX ORDER — HYDROMORPHONE HYDROCHLORIDE 1 MG/ML
0.5 INJECTION, SOLUTION INTRAMUSCULAR; INTRAVENOUS; SUBCUTANEOUS ONCE
Status: COMPLETED | OUTPATIENT
Start: 2021-03-13 | End: 2021-03-13

## 2021-03-13 RX ORDER — IOPAMIDOL 755 MG/ML
77 INJECTION, SOLUTION INTRAVASCULAR ONCE
Status: COMPLETED | OUTPATIENT
Start: 2021-03-13 | End: 2021-03-13

## 2021-03-13 RX ORDER — NALOXONE HYDROCHLORIDE 0.4 MG/ML
0.4 INJECTION, SOLUTION INTRAMUSCULAR; INTRAVENOUS; SUBCUTANEOUS
Status: DISCONTINUED | OUTPATIENT
Start: 2021-03-13 | End: 2021-03-13 | Stop reason: HOSPADM

## 2021-03-13 RX ADMIN — LIDOCAINE 1 PATCH: 560 PATCH PERCUTANEOUS; TOPICAL; TRANSDERMAL at 23:25

## 2021-03-13 RX ADMIN — MIDAZOLAM 1 MG: 1 INJECTION INTRAMUSCULAR; INTRAVENOUS at 00:02

## 2021-03-13 RX ADMIN — SODIUM CHLORIDE 1000 ML: 900 INJECTION, SOLUTION INTRAVENOUS at 22:13

## 2021-03-13 RX ADMIN — HYDROMORPHONE HYDROCHLORIDE 0.5 MG: 1 INJECTION, SOLUTION INTRAMUSCULAR; INTRAVENOUS; SUBCUTANEOUS at 20:32

## 2021-03-13 RX ADMIN — KETOROLAC TROMETHAMINE 15 MG: 15 INJECTION, SOLUTION INTRAMUSCULAR; INTRAVENOUS at 22:13

## 2021-03-13 RX ADMIN — IOPAMIDOL 77 ML: 755 INJECTION, SOLUTION INTRAVENOUS at 21:39

## 2021-03-13 RX ADMIN — TOPICAL ANESTHETIC: 200 SPRAY DENTAL; PERIODONTAL at 00:07

## 2021-03-13 NOTE — ED PROVIDER NOTES
"  History   Chief Complaint:  Post-op Problem    HPI   Nevin Alvarado is a 30 year old female with a history of impulse control disorder, PTSD, borderline personality disorder, depression, anxiety, and ADD who presents to the ED for an evaluation of a post-op problem. Per chart review, the patient was admitted to [facility] from 3/1/21 to 3/7/21 from the ED for a plastic spray bottle in her rectum. On 3/3/21, Dr. Jimenes performed an exploratory laparotomy with successful removal of the bottle. From 3/8/21 to 3/9/21, she was admitted again for rectal bleeding and abdominal pain. CT revealed a small seroma, which was drained, as well as mild wall thickening of the sigmoid colon and trace free fluid left lower quadrant. One staple was removed from the lower right aspect of the incision. Yesterday, she presented to the ED again for continuing post-op symptoms. She as discharged as she has a follow-up with her colorectal surgeon on   3/17/21. Here in the ED today, she describes the abdominal pain as a \"rag of water being rung out.\" She notes that she has rectal bleeding every time she has a bowel movement. Otherwise, she has no other complaints.    Review of Systems   Gastrointestinal: Positive for abdominal pain and anal bleeding.   All other systems reviewed and are negative.    Allergies:  Amoxicillin-pot clavulanate  Oseltamivir  Penicillins  Vancomycin  Hydrocodone  Blood-group specific substance  Cephalosporins  Influenza vaccines  Lamotrigine  Naltrexone    Medications:    Albuterol  Buspar  Catapres  Pristiq  Plaquenil  Latuda  Glucophage  Lyrica  Senokot  Valtrex  Dilaudid  Mycostatin    Past Medical History:    ADD  Anorexia nervosa with bulimia  Anxiety  Borderline personality disorder  Depression  Pulmonary embolism  Polyneuropathy  PTSD  Suicidal ideations  Gallstones  Esophageal stricture  Intentional diphenhydramine overdose  SNHl of left ear  Migraines  Endometriosis  GERD  Carpal tunnel " syndrome  Scoliosis  Herpes labialis  Esophagitis    Past Surgical History:    EGD x31  Fecal impaction removal  Right knee surgery  Exploratory laparotomy x3  Bilateral mammoplasty reduction  Carpal tunnel release  Flexible sigmoidoscopy x3  Power port placement  Cholecystectomy  Lymphadenectomy    Family History:    CVD    Social History:  The patient presented alone.  The patient lives at a group home.    Physical Exam     Patient Vitals for the past 24 hrs:   BP Temp Pulse Resp SpO2   03/12/21 1904 (!) 140/102 97  F (36.1  C) 90 18 99 %       Physical Exam  Vitals signs reviewed.   Constitutional:       Appearance: She is obese.   HENT:      Head: Normocephalic.   Cardiovascular:      Rate and Rhythm: Normal rate.   Pulmonary:      Effort: Pulmonary effort is normal.   Abdominal:      Comments: There is a midline vertical incision from recent laparotomy.  There is well approximated staples without signs of erythema discharge or secondary infection or dehiscence.   Genitourinary:     Comments: Rectum shows no external hemorrhoids no active bleeding.  Musculoskeletal: Normal range of motion.   Skin:     General: Skin is warm.   Neurological:      Mental Status: She is alert.           Emergency Department Course   Imaging:  CT Abd/pelvis without contrast:  1.  Small metallic coiled structure is present in the lumen of the stomach. This is of uncertain significance. Clinical correlation recommended.   2.  No other acute process in the abdomen or pelvis.     Imaging independently reviewed and agree with radiologist interpretation.     XR Abdomen, 1 view:  Surgical staples in the midline. Cholecystectomy clips in the right upper abdomen. Small coiled metallic device in the left upper abdomen. Normal bowel gas pattern. No evidence of obstruction, pneumatosis or pneumoperitoneum. Mild scoliosis.    Imaging independently reviewed and agree with radiologist interpretation.      Emergency Department  Course:    Reviewed:  1937: I reviewed nursing notes, vitals and past history.    Assessments:  1942 I obtained history and examined the patient as noted above.   2154 I rechecked the patient and explained findings/plan of care.    Consults:   1954 I spoke with Dr. Villareal of the colorectal surgery service regarding the patient's presentation, findings, and plan of care.  2127 I spoke with Dr. Villareal of the colorectal surgery service regarding the patient's presentation, findings, and plan of care.  2134 I spoke with Dr. Villareal of the colorectal service regarding the patient's presentation, findings, and plan of care.  2153 I spoke with Dr. Rudolph of the [facility] service regarding the patient's presentation, findings, and plan of care.    Interventions:  2015: Dilaudid 2 mg PO  2214: Nupercainal 1% ointment rectal    Disposition:  The patient was discharged to home.    Impression & Plan    Medical Decision Making:  Patient presents with abdominal pain after surgery.  Patient has a clear care plan due to history of multiforeign body ingestions.  Patient is had recent laparotomy and removal of a spray bottle head from her distal colon and rectum after inability to remove using manual technique.  This is patient's third visit since surgery with severe abdominal pain.  Patient does have a clear care plan but due to recent history of surgery care was discussed with colorectal surgery.  I did eventually speak to Dr. Mcginnis and after plain films identified a foreign body in her stomach CT scan was performed that shows no other foreign bodies no concerns for postoperative complication and therefore he care was handed over to gastroenterology for foreign body removal.  Care was discussed with Dr. Shah on-call for GI gastroenterology who came to do semiurgent endoscopic removal of foreign body to the length and concern for lack of ability to pass the cecal valve.  Once procedure was performed patient will be  discharged after sedation and continue to follow-up as indicated including surgical appointment for postoperative follow-up on the 17th.        Critical Care time:  none    Diagnosis:    ICD-10-CM    1. Anxiety disorder, unspecified type  F41.9 Case Request: ESOPHAGOGASTRODUODENOSCOPY, WITH FOREIGN BODY REMOVAL     Case Request: ESOPHAGOGASTRODUODENOSCOPY, WITH FOREIGN BODY REMOVAL   2. History of self injurious behavior  Z91.5 Case Request: ESOPHAGOGASTRODUODENOSCOPY, WITH FOREIGN BODY REMOVAL     Case Request: ESOPHAGOGASTRODUODENOSCOPY, WITH FOREIGN BODY REMOVAL   3. Swallowed foreign body, subsequent encounter  T18.9XXD Case Request: ESOPHAGOGASTRODUODENOSCOPY, WITH FOREIGN BODY REMOVAL     Case Request: ESOPHAGOGASTRODUODENOSCOPY, WITH FOREIGN BODY REMOVAL   4. Swallowed foreign body, intentional  T18.9XXA Case Request: ESOPHAGOGASTRODUODENOSCOPY, WITH FOREIGN BODY REMOVAL     Case Request: ESOPHAGOGASTRODUODENOSCOPY, WITH FOREIGN BODY REMOVAL   5. Postoperative pain  G89.18    6. Foreign body in stomach, initial encounter  T18.2XXA        Discharge Medications:  Discharge Medication List as of 3/13/2021 12:39 AM          Scribe Disclosure:  Serene MURRAY, am serving as a scribe at 7:42 PM on 3/12/2021 to document services personally performed by Reece Silverio MD based on my observations and the provider's statements to me.      Reece Silverio MD  03/13/21 1800

## 2021-03-13 NOTE — ED NOTES
"Pt put her call light on and said \" As you guys know I have a history of self harm and today I relapsed with self harm. I swallowed a big metal piece.\" MD and RN notified.   "

## 2021-03-13 NOTE — DISCHARGE INSTRUCTIONS
The gastroenterologist has recommended yet again to remove a foreign body from your stomach.  There is no cause for abdominal pain noted on examination and CT scan awaiting x-ray.  Please follow-up with the colorectal surgeons on the 17th as scheduled.

## 2021-03-13 NOTE — ED NOTES
Answered the bathroom call light where pt was using the bathroom. Pt requested assistance wiping. Writer assisted pt. RN notified.

## 2021-03-13 NOTE — ED TRIAGE NOTES
"Patient reports having abdominal sx on Wednesday at [facility] . States,   \" I had a spray bottle removed and had abdominal surgery and since then I have been having diarrhea, rectal bleeding.\"    C/o increased pain and has been taking tylenol and ibuprofen without relief     Denies fever, vomiting     ABC intact   "

## 2021-03-14 NOTE — DISCHARGE INSTRUCTIONS
Instructions from your doctor today:  Emergency Department testing is focused on the potential causes of your symptoms that are the most dangerous possibilities, and cannot cover every possibility. Based on the evaluation, it was deemed sufficiently safe to discharge and continue management through the clinics. Thus, follow-up is very important to assess for improvement/worsening, potential further testing, and potential treatment adjustments.     Please make an appointment to follow up with:  - Your Primary Care Provider in 1-2 days  - If you do not have a primary care provider, you can be seen in follow-up and establish care by calling any of the clinics below:     - [facility] (phone: [phone])     - [facility] / [facility] (phone: [phone])   - Have your provider review the results from today's visit with you again, including any potential follow-up or additional testing that may be needed based on the results. Occasionally, incidental findings are found on later review by radiologists that may need follow-up.     Return to the Emergency Department immediately if you have worsening symptoms, or any other urgent or potentially life-threatening concerns.

## 2021-03-14 NOTE — ED PROVIDER NOTES
History     Chief Complaint   Patient presents with     Chest Pain     Shortness of Breath     Dizziness     The history is provided by the patient, medical records and the EMS personnel.     Nevin Alvarado is a 30 year old female with past medical history notable for borderline personality disorder, many episodes of intentionally swallowed foreign bodies with laparotomy for rectal foreign body removal 2 weeks ago and EGD from removal of a spring from her stomach yesterday, pulmonary embolism (not currently anticoagulated) who presents the ED with left lower chest pain.  Patient reports that the pain came on acutely about 1 hour prior to arrival.  It awoke her from sleep.  She endorses shortness of breath and worsening of the pain when taking a deep breath.  No recent cough or fever.    Patient also endorses headache.  It is frontal in location.  No neck pain or stiffness.  No vision change, no numbness, no weakness.    EMS reports that they gave the patient two 50 mcg doses of fentanyl in route along with 15 mg of ketorolac with mild improvement in symptoms.  EMS EKG reportedly without evidence of acute ischemia.    This part of the medical record was transcribed by Shantel Vinson, Medical Scribe, from a dictation done by Donavon Young MD.      Past Medical History  Past Medical History:   Diagnosis Date     ADD (attention deficit disorder)      Anorexia nervosa with bulimia     history of; on Topamax     Anxiety      Borderline personality disorder      Depression      H/O self-harm      h/o Suicide attempt 02/21/2018     History of pulmonary embolism 12/2019    Provoked. Completed 3 month course of Apixaban     Morbid obesity      Neuropathy      PTSD (post-traumatic stress disorder)      Rectal foreign body - Recurrent issue, self placed      Swallowed foreign body - Recurrent issue, self placed      Past Surgical History:   Procedure Laterality Date     COMBINED ESOPHAGOSCOPY, GASTROSCOPY,  DUODENOSCOPY (EGD), REPLACE ESOPHAGEAL STENT N/A 10/9/2019    Procedure: Upper Endoscopy with Suture Placement;  Surgeon: Zurdo Ramirez MD;  [facility]     ESOPHAGOSCOPY, GASTROSCOPY, DUODENOSCOPY (EGD), COMBINED N/A 3/9/2017    Procedure: COMBINED ESOPHAGOSCOPY, GASTROSCOPY, DUODENOSCOPY (EGD), REMOVE FOREIGN BODY;  Surgeon: Avis Guzmán MD;  [facility]     ESOPHAGOSCOPY, GASTROSCOPY, DUODENOSCOPY (EGD), COMBINED N/A 4/20/2017    Procedure: COMBINED ESOPHAGOSCOPY, GASTROSCOPY, DUODENOSCOPY (EGD), REMOVE FOREIGN BODY;  EGD removal Foregin body;  Surgeon: Lokesh Paula MD;  [facility]     ESOPHAGOSCOPY, GASTROSCOPY, DUODENOSCOPY (EGD), COMBINED N/A 6/12/2017    Procedure: COMBINED ESOPHAGOSCOPY, GASTROSCOPY, DUODENOSCOPY (EGD);  COMBINED ESOPHAGOSCOPY, GASTROSCOPY, DUODENOSCOPY (EGD) [[id number]]attempted removal of foreign body;  Surgeon: Pamela Perez MD;  [facility]     ESOPHAGOSCOPY, GASTROSCOPY, DUODENOSCOPY (EGD), COMBINED N/A 6/9/2017    Procedure: COMBINED ESOPHAGOSCOPY, GASTROSCOPY, DUODENOSCOPY (EGD), REMOVE FOREIGN BODY;  Esophagoscopy, Gastroscopy, Duodenoscopy, Removal of Foreign Body;  Surgeon: Dejon Marsh MD;  [facility]     ESOPHAGOSCOPY, GASTROSCOPY, DUODENOSCOPY (EGD), COMBINED N/A 1/6/2018    Procedure: COMBINED ESOPHAGOSCOPY, GASTROSCOPY, DUODENOSCOPY (EGD), REMOVE FOREIGN BODY;  COMBINED ESOPHAGOSCOPY, GASTROSCOPY, DUODENOSCOPY (EGD) [by pascal net and snare with profol sedation;  Surgeon: Feliciano Emmanuel MD;  Location:  GI     ESOPHAGOSCOPY, GASTROSCOPY, DUODENOSCOPY (EGD), COMBINED N/A 3/19/2018    Procedure: COMBINED ESOPHAGOSCOPY, GASTROSCOPY, DUODENOSCOPY (EGD), REMOVE FOREIGN BODY;   Esophagodscopy, Gastroscopy, Duodenoscopy,Foreign Body Removal;  Surgeon: Brice Guzmán MD;  [facility]     ESOPHAGOSCOPY, GASTROSCOPY, DUODENOSCOPY (EGD), COMBINED N/A 4/16/2018    Procedure: COMBINED  ESOPHAGOSCOPY, GASTROSCOPY, DUODENOSCOPY (EGD), REMOVE FOREIGN BODY;  Esophagogastroduodenoscopy  Foreign Body Removal X 2;  Surgeon: Royer Moise MD;  [facility]     ESOPHAGOSCOPY, GASTROSCOPY, DUODENOSCOPY (EGD), COMBINED N/A 6/1/2018    Procedure: COMBINED ESOPHAGOSCOPY, GASTROSCOPY, DUODENOSCOPY (EGD), REMOVE FOREIGN BODY;  COMBINED ESOPHAGOSCOPY, GASTROSCOPY, DUODENOSCOPY with removal of foreign body, propofol sedation by anesthesia;  Surgeon: Brice Martinez MD;  Location:  GI     ESOPHAGOSCOPY, GASTROSCOPY, DUODENOSCOPY (EGD), COMBINED N/A 7/25/2018    Procedure: COMBINED ESOPHAGOSCOPY, GASTROSCOPY, DUODENOSCOPY (EGD), REMOVE FOREIGN BODY;;  Surgeon: Candy Castelan MD;  Location:  GI     ESOPHAGOSCOPY, GASTROSCOPY, DUODENOSCOPY (EGD), COMBINED N/A 7/28/2018    Procedure: COMBINED ESOPHAGOSCOPY, GASTROSCOPY, DUODENOSCOPY (EGD), REMOVE FOREIGN BODY;  COMBINED ESOPHAGOSCOPY, GASTROSCOPY, DUODENOSCOPY (EGD), REMOVE FOREIGN BODY;  Surgeon: Brice Guzmán MD;  [facility]     ESOPHAGOSCOPY, GASTROSCOPY, DUODENOSCOPY (EGD), COMBINED N/A 7/31/2018    Procedure: COMBINED ESOPHAGOSCOPY, GASTROSCOPY, DUODENOSCOPY (EGD);  COMBINED ESOPHAGOSCOPY, GASTROSCOPY, DUODENOSCOPY (EGD) TO REMOVE FOREIGN BODY;  Surgeon: Lokesh Paula MD;  [facility]     ESOPHAGOSCOPY, GASTROSCOPY, DUODENOSCOPY (EGD), COMBINED N/A 8/4/2018    Procedure: COMBINED ESOPHAGOSCOPY, GASTROSCOPY, DUODENOSCOPY (EGD), REMOVE FOREIGN BODY;   combined esophagoscopy, gastroscopy, duodenoscopy, REMOVE FOREIGN BODY ;  Surgeon: Lokesh Paula MD;  [facility]     ESOPHAGOSCOPY, GASTROSCOPY, DUODENOSCOPY (EGD), COMBINED N/A 10/6/2019    Procedure: ESOPHAGOGASTRODUODENOSCOPY (EGD) with fireign body removal x2, esophageal stent placement with floroscopy;  Surgeon: Timoteo Espana MD;  [facility]     ESOPHAGOSCOPY, GASTROSCOPY, DUODENOSCOPY (EGD), COMBINED N/A 12/2/2019    Procedure:  Esophagogastroduodenoscopy with esophageal stent removal, esophogram;  Surgeon: Kailee Tena MD;  [facility]     ESOPHAGOSCOPY, GASTROSCOPY, DUODENOSCOPY (EGD), COMBINED N/A 12/17/2019    Procedure: ESOPHAGOGASTRODUODENOSCOPY, WITH FOREIGN BODY REMOVAL;  Surgeon: Pamela Perez MD;  [facility]     ESOPHAGOSCOPY, GASTROSCOPY, DUODENOSCOPY (EGD), COMBINED N/A 12/13/2019    Procedure: ESOPHAGOGASTRODUODENOSCOPY, WITH FOREIGN BODY REMOVAL;  Surgeon: Samia Stanton MD;  [facility]     ESOPHAGOSCOPY, GASTROSCOPY, DUODENOSCOPY (EGD), COMBINED N/A 12/28/2019    Procedure: ESOPHAGOGASTRODUODENOSCOPY (EGD) Removal of Foreign Body X 2;  Surgeon: Huy Kelley MD;  [facility]     ESOPHAGOSCOPY, GASTROSCOPY, DUODENOSCOPY (EGD), COMBINED N/A 1/5/2020    Procedure: ESOPHAGOGASTRODUOENOSCOPY WITH FOREIGN BODY REMOVAL;  Surgeon: Pamela Perez MD;  [facility]     ESOPHAGOSCOPY, GASTROSCOPY, DUODENOSCOPY (EGD), COMBINED N/A 1/3/2020    Procedure: ESOPHAGOGASTRODUODENOSCOPY (EGD) REMOVAL OF FOREIGN BODY.;  Surgeon: Pamela Perez MD;  [facility]     ESOPHAGOSCOPY, GASTROSCOPY, DUODENOSCOPY (EGD), COMBINED N/A 1/13/2020    Procedure: ESOPHAGOGASTRODUODENOSCOPY (EGD) for foreign body removal;  Surgeon: Lokesh Paula MD;  [facility]     ESOPHAGOSCOPY, GASTROSCOPY, DUODENOSCOPY (EGD), COMBINED N/A 1/18/2020    Procedure: Diagnostic ESOPHAGOGASTRODUODENOSCOPY (EGD;  Surgeon: Lokesh Paula MD;  [facility]     ESOPHAGOSCOPY, GASTROSCOPY, DUODENOSCOPY (EGD), COMBINED N/A 3/29/2020    Procedure: UPPER ENDOSCOPY WITH FOREIGN BODY REMOVAL;  Surgeon: Doug Hansen MD;  [facility]     ESOPHAGOSCOPY, GASTROSCOPY, DUODENOSCOPY (EGD), COMBINED N/A 7/11/2020    Procedure: ESOPHAGOGASTRODUODENOSCOPY (EGD); Upper Endoscopy WITH FOREIGN BODY REMOVAL;  Surgeon: Lyndsey Mendoza DO;  [facility]     ESOPHAGOSCOPY,  GASTROSCOPY, DUODENOSCOPY (EGD), COMBINED N/A 7/29/2020    Procedure: ESOPHAGOGASTRODUODENOSCOPY REMOVAL OF FOREIGN BODY;  Surgeon: Samia Stanton MD;  [facility]     ESOPHAGOSCOPY, GASTROSCOPY, DUODENOSCOPY (EGD), COMBINED N/A 8/1/2020    Procedure: ESOPHAGOGASTRODUODENOSCOPY, WITH FOREIGN BODY REMOVAL;  Surgeon: Pamela Perez MD;  [facility]     ESOPHAGOSCOPY, GASTROSCOPY, DUODENOSCOPY (EGD), COMBINED N/A 8/18/2020    Procedure: ESOPHAGOGASTRODUODENOSCOPY (EGD) for foreign body removal;  Surgeon: Pamela Perez MD;  [facility]     ESOPHAGOSCOPY, GASTROSCOPY, DUODENOSCOPY (EGD), COMBINED N/A 8/27/2020    Procedure: ESOPHAGOGASTRODUODENOSCOPY (EGD) with foreign body removal;  Surgeon: Campbell Rogers MD;  [facility]     ESOPHAGOSCOPY, GASTROSCOPY, DUODENOSCOPY (EGD), COMBINED N/A 9/18/2020    Procedure: ESOPHAGOGASTRODUODENOSCOPY (EGD) with foreign body removal;  Surgeon: Dick Gillis MD;  [facility]     ESOPHAGOSCOPY, GASTROSCOPY, DUODENOSCOPY (EGD), COMBINED N/A 11/18/2020    Procedure: ESOPHAGOGASTRODUODENOSCOPY, WITH FOREIGN BODY REMOVAL;  Surgeon: Felipe Ulloa DO;  [facility]     ESOPHAGOSCOPY, GASTROSCOPY, DUODENOSCOPY (EGD), COMBINED N/A 11/28/2020    Procedure: ESOPHAGOGASTRODUODENOSCOPY (EGD);  Surgeon: Campbell Rogers MD;  [facility]     EXAM UNDER ANESTHESIA ANUS N/A 1/10/2017    Procedure: EXAM UNDER ANESTHESIA ANUS;  Surgeon: Annmarie Haynes MD;  [facility]     EXAM UNDER ANESTHESIA RECTUM N/A 7/19/2018    Procedure: EXAM UNDER ANESTHESIA RECTUM;  EXAM UNDER ANESTHESIA, REMOVAL OF RECTAL FOREIGN BODY;  Surgeon: Annmarie Haynes MD;  [facility]     HC REMOVE FECAL IMPACTION OR FB W ANESTHESIA N/A 12/18/2016    Procedure: REMOVE FECAL IMPACTION/FOREIGN BODY UNDER ANESTHESIA;  Surgeon: Soham Cano MD;  Location: [facility]     KNEE SURGERY - removed a small tissue mass from knee  Right      LAPAROSCOPIC ABLATION ENDOMETRIOSIS       LAPAROTOMY EXPLORATORY N/A 1/10/2017    Procedure: LAPAROTOMY EXPLORATORY;  Surgeon: Annmarie Haynes MD;  [facility]     LAPAROTOMY EXPLORATORY  09/11/2019    Manual manipulation of bowel to remove pill bottle in rectum     lymph nodes removed from neck; benign  age 7     MAMMOPLASTY REDUCTION Bilateral      RELEASE CARPAL TUNNEL Bilateral      SIGMOIDOSCOPY FLEXIBLE N/A 1/10/2017    Procedure: SIGMOIDOSCOPY FLEXIBLE;  Surgeon: Annmarie Haynes MD;  [facility]     SIGMOIDOSCOPY FLEXIBLE N/A 5/8/2018    Procedure: SIGMOIDOSCOPY FLEXIBLE;  flex sig with foreign body removal using snare and rattooth forcep;  Surgeon: Soham Cano MD;  [facility]     SIGMOIDOSCOPY FLEXIBLE N/A 2/20/2019    Procedure: Exam under anesthesia Colonoscopy with attempted  removal of rectal foreign body;  Surgeon: Estrada Chávez MD;  [facility]     acetaminophen (TYLENOL) 500 MG tablet  albuterol (PROAIR HFA/PROVENTIL HFA/VENTOLIN HFA) 108 (90 Base) MCG/ACT inhaler  busPIRone (BUSPAR) 15 MG tablet  Cholecalciferol (VITAMIN D) 50 MCG (2000 UT) CAPS  cloNIDine (CATAPRES) 0.1 MG tablet  desvenlafaxine (PRISTIQ) 100 MG 24 hr tablet  hydroxychloroquine (PLAQUENIL) 200 MG tablet  lurasidone (LATUDA) 60 MG TABS tablet  metFORMIN (GLUCOPHAGE-XR) 500 MG 24 hr tablet  pregabalin (LYRICA) 50 MG capsule  sennosides (SENOKOT) 8.6 MG tablet  valACYclovir (VALTREX) 1000 mg tablet      Allergies   Allergen Reactions     Amoxicillin-Pot Clavulanate Other (See Comments), Swelling and Rash     PN: facial swelling, left side. Also had cortisone injection the same day as she started the Augmentin.  Noted in downtime recovery of chart.    PN: facial swelling, left side. Also had cortisone injection the same day as she started the Augmentin.; HUT Comment: PN: facial swelling, left side. Also had cortisone injection the same day as she started the Augmentin.Noted in  downtime recovery of chart.; HUT Reaction: Rash; HUT Reaction: Unknown; HUT Reaction Type: Allergy; HUT Severity: Med; HUT Noted: [id number]     Oseltamivir Hives     med stopped, PN: med stopped  med stopped, PN: med stopped; HUT Comment: med stopped, PN: med stopped; HUT Reaction: Hives; HUT Reaction Type: Allergy; HUT Severity: Med; HUT Noted: [id number]     Penicillins Anaphylaxis     HUT Reaction: Anaphylaxis; HUT Reaction Type: Allergy; HUT Severity: High; HUT Noted: [id number]     Vancomycin Itching, Swelling and Rash     Other reaction(s): Redness  Flushed face, very itchy; HUT Comment: Flushed face, very itchy; HUT Reaction: Angioedema; HUT Reaction: Redness; HUT Severity: Med; HUT Noted: [id number]    facial     Hydrocodone Nausea and Vomiting and GI Disturbance     vomiting for days, PN: vomiting for days; HUT Comment: vomiting for days; HUT Reaction: Gastrointestinal; HUT Reaction: Nausea And Vomiting; HUT Reaction Type: Intolerance; HUT Severity: Med; HUT Noted: [id number]  vomiting for days       Blood-Group Specific Substance Other (See Comments)     Patient has an anti-Cw and non-specific antibodies. Blood product orders may be delayed. Draw one red top and two purple top tubes for all type/screen/crossmatch orders.  Patient has anti-Cw, anti-K ([place]), Warm auto and nonspecific antibodies. Blood products may be delayed. Draw patient 24 hours prior to transfusion. Draw one red top and two purple top tubes for all type and screen orders.     Oxycodone Swelling     Cephalosporins Rash     Influenza Vaccines Other (See Comments) and Nausea and Vomiting     HUT Reaction: Nausea And Vomiting; HUT Reaction Type: Intolerance; HUT Severity: Low; HUT Noted: [id number]     Lamotrigine Rash     Possibly associated with Lamictal.   HUT Comment: Possibly associated with Lamictal. ; HUT Reaction: Rash; HUT Reaction Type: Allergy; HUT Severity: Low; HUT Noted: [id number]     Latex Rash     HUT Reaction: Rash; HUT Reaction Type:  Allergy; HUT Severity: Low; HUT Noted: [id number]     Family History  Family History   Problem Relation Age of Onset     Diabetes Type 2  Maternal Grandmother      Diabetes Type 2  Paternal Grandmother      Breast Cancer Paternal Grandmother      Cerebrovascular Disease Father 53     Myocardial Infarction No family hx of      Coronary Artery Disease Early Onset No family hx of      Ovarian Cancer No family hx of      Colon Cancer No family hx of      Social History   Social History     Tobacco Use     Smoking status: Never Smoker     Smokeless tobacco: Never Used   Substance Use Topics     Alcohol use: No     Alcohol/week: 0.0 standard drinks     Drug use: No      Past medical history, past surgical history, medications, allergies, family history, and social history were reviewed with the patient. No additional pertinent items.      Review of Systems  A complete review of systems was performed with pertinent positives and negatives noted in the HPI, and all other systems negative.     Physical Exam   BP: (!) 160/99  Pulse: 84  Temp: 98  F (36.7  C)  Resp: 26  SpO2: 99 %    Physical Exam  General: uncomfortable appearing. Appears stated age.   HENT: MMM, no oropharyngeal lesions  Eyes: PERRL, normal sclerae  Neck: non-tender, supple  Cardio: regular rate. Regular rhythm. Extremities well perfused  Resp: Normal work of breathing, normal respiratory rate.  Chest/Back: no visual signs of trauma, no chest wall tenderness  Abdomen: no tenderness, non-distended, no rebound, no guarding  Neuro: alert and fully oriented. CN II-XII grossly intact. Grossly normal strength and sensation in all extremities.   MSK: no deformities. Grossly normal ROM in extremities.   Integumentary/Skin: no rash visualized, normal color  Psych: normal affect, normal behavior    ED Course      Procedures             EKG Interpretation:      Interpreted by Donavon Young MD  Time reviewed: 2024  Symptoms at time of EKG: chest pain   Rhythm: normal  sinus   Rate: Normal  Axis: Normal  Ectopy: none  Conduction: normal  ST Segments/ T Waves: No ST-T wave changes and No acute ischemic changes  Q Waves: none  Comparison to prior: Unchanged from 9/9/2020    Clinical Impression: normal EKG       Labs Ordered and Resulted from Time of ED Arrival Up to the Time of Departure from the ED   CBC WITH PLATELETS DIFFERENTIAL - Abnormal; Notable for the following components:       Result Value    Hemoglobin 10.9 (*)     Hematocrit 34.7 (*)     MCHC 31.4 (*)     RDW 15.1 (*)     Absolute Eosinophils 1.0 (*)     All other components within normal limits   D DIMER QUANTITATIVE - Abnormal; Notable for the following components:    D Dimer 1.2 (*)     All other components within normal limits   TROPONIN I   BASIC METABOLIC PANEL   INFLUENZA A/B & SARS-COV2 PCR MULTIPLEX   CARDIAC CONTINUOUS MONITORING     CT Chest Pulmonary Embolism w Contrast   Preliminary Result   IMPRESSION:     Exam is negative  for acute pulmonary embolism. No evidence of right   heart strain.                 In the event of a positive result for acute pulmonary embolism   resulting in right heart strain, please activate the PERT   Multidisciplinary group for consultation by paging [phone]   ([phone]).       PERT -- Pulmonary Embolism Response Team (Multidisciplinary team   including cardiology, interventional radiology, critical care,   hematology)      US Lower Extremity Venous Duplex Bilateral    (Results Pending)          Assessments & Plan (with Medical Decision Making)   Patient presenting with left low chest pain and headache that acutely started 1 hour prior to arrival. No abdominal pain nor reported ingestion today. Vitals in the ED unremarkable, SpO2 100%. Nursing notes reviewed. Initial differential diagnosis includes but not limited to PE, MSK pain, ACS, aortic dissection, malingering.     ECG shows NSR without evidence of acute ischemia, troponin negative, age younger than typical for ACS, HEART  score 1 - ACS very unlikely. Lack of ECG findings and lack of effusion makes pericarditis very unlikely. VTE risk factor profile high - CT PA ordered to evaluate for PE. CT negative for PE, was without evidence of esophageal perforation, pneumonia, aortic dissection, pneumothorax, pleural effusion, nor other visualized pathology. Bilateral leg venous US somewhat limited by habitus, demonstrated no evidence of DVT.    In the ED, the patient's symptoms were managed with hydromorphone and ketorolac, with improvement in symptoms upon reassessment.     For the patient's headache. She has history of recurrent severe headaches and many ED visits for them with many negative head CTs. No trauma to suggest traumatic ICH. No aneurisms present on MR brain in 2017 - aneurismal SAH very unlikely. Symptoms consistent with potential migraine. Headache resolved after fluid bolus and medications.     The complete clinical picture is most consistent with atypical chest pain and headache. After counseling on the diagnosis, work-up, and treatment plan, the patient was discharged to home. The patient was advised to follow-up with primary care in 1-3 days. The patient was advised to return to the ED if worsening symptoms, or if there are any urgent/life-threatening concerns.     Final diagnoses:   Pleuritic chest pain   Headache, unspecified headache type     New Prescriptions    No medications on file       --  Donavon Young MD   Emergency Medicine   [facility] EMERGENCY DEPARTMENT  3/13/2021     Donavon Young MD  03/13/21 [id number]

## 2021-03-14 NOTE — ED NOTES
Bed: ED04  Expected date:   Expected time:   Means of arrival:   Comments:  [facility] 31 yo F hx blood clots. Pain under left breast. Fentanyl. ETA 2000

## 2021-03-14 NOTE — ED TRIAGE NOTES
Pt BIBA due to new onset of pain under her left breast, exacerbated with inflammation.  Pt had abdominal surgery on 3/3 for foreign body removal.  Has a hx of blood clots.  Complaining of SOB, dizziness, cluster HA, and shaky/weak legs.  No photophobia or other deficits noted.  Denies any N/V/D.

## 2021-03-18 NOTE — MR AVS SNAPSHOT
After Visit Summary   6/15/2017    Nevin Alvarado    MRN: [id number]           Patient Information     Date Of Birth          1990        Visit Information        Provider Department      6/15/2017 3:15 PM [facility]        Today's Diagnoses     Nausea    -  1      Care Instructions    Please go to [facility] outpatient radiology for abdominal xray tomorrow.     GI will let you know the results (and whether another xray is going to be ordered for Monday).    ---    Please make follow-up appointment with psychiatry for next week at latest - you just came out of hospital for, in part, a psychiatric condition.     ---    Refills of Zofran sent in.           Follow-ups after your visit        Your next 10 appointments already scheduled     Kaden 15, 2017  3:15 PM CDT   [facility]    [address] [address] [phone]           Bring a current list of meds and any records pertaining to this visit.  For Physicals, please bring immunization records and any forms needing to be filled out.  Please arrive 10 minutes early to complete paperwork.              Future tests that were ordered for you today     Open Future Orders        Priority Expected Expires Ordered    XR Abdomen 2 Views Routine 6/16/2017 6/15/2018 6/15/2017            Who to contact     If you have questions or need follow up information about today's clinic visit or your schedule please contact [facility] directly at [phone].  Normal or non-critical lab and imaging results will be communicated to you by MyChart, letter or phone within 4 business days after the clinic has received the results. If you do not hear from us within 7 days, please contact the clinic through MyChart or phone. If you have a critical or abnormal lab  result, we will notify you by phone as soon as possible.  Submit refill requests through [employer] or call your pharmacy and they will forward the refill request to us. Please allow 3 business days for your refill to be completed.          Additional Information About Your Visit        [employer] Information     [employer] gives you secure access to your electronic health record. If you see a primary care provider, you can also send messages to your care team and make appointments. If you have questions, please call your primary care clinic.  If you do not have a primary care provider, please call [phone] and they will assist you.        Care EveryWhere ID     This is your Care EveryWhere ID. This could be used by other organizations to access your [facility] medical records  [id number]        Your Vitals Were     Pulse Temperature Pulse Oximetry BMI (Body Mass Index)          101 98.4  F (36.9  C) (Oral) 96% 40.84 kg/m2         Blood Pressure from Last 3 Encounters:   06/15/17 114/78   06/15/17 (!) 142/93   06/13/17 129/79    Weight from Last 3 Encounters:   06/15/17 223 lb 4.8 oz (101.3 kg)   06/15/17 226 lb (102.5 kg)   06/11/17 226 lb (102.5 kg)              Today, you had the following     No orders found for display         Today's Medication Changes          These changes are accurate as of: 6/15/17  3:11 PM.  If you have any questions, ask your nurse or doctor.               Start taking these medicines.        Dose/Directions    ondansetron 4 MG ODT tab   Commonly known as:  ZOFRAN ODT   Used for:  Nausea   Started by:  Sandra Ramos MD        Dose:  4-8 mg   Take 1-2 tablets (4-8 mg) by mouth every 8 hours as needed for nausea   Quantity:  20 tablet   Refills:  1            Where to get your medicines      These [place] [facility] [place] [place] [address], [place] [address]     Phone:  [phone]     ondansetron 4 MG ODT tab                 Primary Care Provider Office Phone # Fax #    Sandra Ramos [phone]       [facility] [address] [facility] [address]        Thank you!     Thank you for [place] [facility]. Our goal is always to provide you with excellent care. Hearing back from our patients is one way we can continue to improve our services. Please take a few minutes to complete the written survey that you may receive in the mail after your visit with us. Thank you!             Your Updated Medication List - Protect others around you: Learn how to safely use, store and throw away your medicines at www.disposemymeds.org.          This list is accurate as of: 6/15/17  3:11 PM.  Always use your most recent med list.                   Brand Name Dispense Instructions for use    acetaminophen 500 MG tablet    TYLENOL    90 tablet    Take 1-2 tablets (500-1,000 mg) by mouth every 8 hours as needed for mild pain       albuterol 108 (90 BASE) MCG/ACT Inhaler    albuterol    1 Inhaler    Inhale 2 puffs into the lungs every 4 hours as needed for shortness of breath / dyspnea       augmented betamethasone dipropionate 0.05 % ointment    DIPROLENE-AF    45 g    Apply to AA BID x 2-3 weeks then PRN only       ibuprofen 600 MG tablet    ADVIL/MOTRIN    30 tablet    Take 1 tablet (600 mg) by mouth every 6 hours as needed for moderate pain       LAMICTAL 100 MG tablet   Generic drug:  lamoTRIgine      Take 1 tablet (100 mg) by mouth daily       levonorgestrel 20 MCG/24HR IUD    MIRENA     1 each (20 mcg) by Intrauterine route once for 1 dose       ondansetron 4 MG ODT tab    ZOFRAN ODT    20 tablet    Take 1-2 tablets (4-8 mg) by mouth every 8 hours as needed for nausea       polyethylene glycol powder    MIRALAX/GLYCOLAX    510 g    Take 17 g by mouth daily as needed for constipation       PRISTIQ PO      Take 100 mg by mouth daily       senna 8.6 MG tablet    SENOKOT     60 tablet    Take 1 tablet by mouth 2 times daily as needed for constipation       SUMATRIPTAN SUCCINATE PO      Take 50 mg by mouth once as needed for migraine Reported on 5/19/2017       TOPAMAX 50 MG tablet   Generic drug:  topiramate      Take 1 tablet (50 mg) by mouth 2 times daily       traMADol 50 MG tablet    ULTRAM     Take 5 mg by mouth as needed       VITAMIN D3 PO      Take 5,000 Units by mouth daily          4

## 2021-03-27 ENCOUNTER — HEALTH MAINTENANCE LETTER (OUTPATIENT)
Age: 31
End: 2021-03-27

## 2021-04-19 ENCOUNTER — SURGERY - HEALTHEAST (OUTPATIENT)
Dept: SURGERY | Facility: HOSPITAL | Age: 31
End: 2021-04-19

## 2021-04-19 ENCOUNTER — ANESTHESIA - HEALTHEAST (OUTPATIENT)
Dept: SURGERY | Facility: HOSPITAL | Age: 31
End: 2021-04-19

## 2021-04-19 ASSESSMENT — MIFFLIN-ST. JEOR: SCORE: 1966.47

## 2021-04-20 ENCOUNTER — COMMUNICATION - HEALTHEAST (OUTPATIENT)
Dept: SCHEDULING | Facility: CLINIC | Age: 31
End: 2021-04-20

## 2021-05-31 VITALS — BODY MASS INDEX: 44.88 KG/M2 | HEIGHT: 61 IN | WEIGHT: 237.7 LBS

## 2021-05-31 NOTE — ANESTHESIA PROCEDURE NOTES
Peripheral IV start      Staffing:  Performing  Performing CRNA: Hever Woodruff CRNA  IV:   Laterality: right (Mid clavicle.  Power port.)    Prepped with: ChloroPrep        Needle gauge: 20 G  Number of Attempts: 1  Secured with: transparent dressing  Flushed with: saline

## 2021-05-31 NOTE — ANESTHESIA CARE TRANSFER NOTE
Last vitals:   Vitals:    08/24/19 0016   BP: 141/73   Pulse: (!) 104   Resp: 20   Temp: 36.1  C (97  F)   SpO2: 100%     Patient's level of consciousness is awake  Spontaneous respirations: yes  Maintains airway independently: yes  Dentition unchanged: yes  Oropharynx: oropharynx clear of all foreign objects    QCDR Measures:  ASA# 20 - Surgical Safety Checklist: WHO surgical safety checklist completed prior to induction    PQRS# 430 - Adult PONV Prevention: 4558F - Pt received => 2 anti-emetic agents (different classes) preop & intraop  ASA# 8 - Peds PONV Prevention: NA - Not pediatric patient, not GA or 2 or more risk factors NOT present  PQRS# 424 - Tami-op Temp Management: NA - MAC anesthesia or case < 60 minutes  PQRS# 426 - PACU Transfer Protocol: - Transfer of care checklist used  ASA# 14 - Acute Post-op Pain: ASA14B - Patient did NOT experience pain >= 7 out of 10

## 2021-05-31 NOTE — ANESTHESIA PREPROCEDURE EVALUATION
Anesthesia Evaluation      Patient summary reviewed   No history of anesthetic complications     Airway   Mallampati: II  Neck ROM: full   Pulmonary - normal exam    breath sounds clear to auscultation  (+) asthma    (-) sleep apnea, not a smoker                         Cardiovascular - normal exam  Exercise tolerance: > or = 4 METS  (-) angina  Rhythm: regular  Rate: normal,         Neuro/Psych    (+) depression, anxiety/panic attacks,     Comments: ptsd  Personality d/o  ADHD  Self injurious behavior  Eating d/o     Endo/Other    (+) obesity,   (-) not pregnant     GI/Hepatic/Renal    (+) GERD well controlled,        Other findings: Last ate at noon. Liquids 2pm      Dental - normal exam   (+) poor dentition and chipped                         Anesthesia Plan  Planned anesthetic: general endotracheal    ASA 3 - emergent   Induction: intravenous   Anesthetic plan and risks discussed with: patient  Anesthesia plan special considerations: antiemetics,   Post-op plan: routine recovery

## 2021-05-31 NOTE — ANESTHESIA POSTPROCEDURE EVALUATION
Patient: Nevin Alvarado  REMOVAL, FOREIGN BODY, RECTUM,  Anesthesia type: general    Patient location: PACU  Last vitals:   Vitals Value Taken Time   /72 8/24/2019 12:20 AM   Temp 36.1  C (97  F) 8/24/2019 12:16 AM   Pulse 105 8/24/2019 12:20 AM   Resp 18 8/24/2019 12:20 AM   SpO2 100 % 8/24/2019 12:20 AM   Vitals shown include unvalidated device data.  Post vital signs: stable  Level of consciousness: awake and responds to simple questions  Post-anesthesia pain: pain controlled  Post-anesthesia nausea and vomiting: no  Pulmonary: unassisted, return to baseline  Cardiovascular: stable and blood pressure at baseline  Hydration: adequate  Anesthetic events: no    QCDR Measures:  ASA# 11 - Tami-op Cardiac Arrest: ASA11B - Patient did NOT experience unanticipated cardiac arrest  ASA# 12 - Tami-op Mortality Rate: ASA12B - Patient did NOT die  ASA# 13 - PACU Re-Intubation Rate: ASA13B - Patient did NOT require a new airway mgmt  ASA# 10 - Composite Anes Safety: ASA10A - No serious adverse event    Additional Notes:

## 2021-06-01 VITALS — HEIGHT: 61 IN | WEIGHT: 240 LBS | BODY MASS INDEX: 45.31 KG/M2

## 2021-06-02 VITALS — BODY MASS INDEX: 47.7 KG/M2 | WEIGHT: 259.2 LBS | HEIGHT: 62 IN

## 2021-06-02 VITALS — HEIGHT: 62 IN | BODY MASS INDEX: 46.96 KG/M2 | WEIGHT: 255.2 LBS

## 2021-06-02 VITALS — BODY MASS INDEX: 46.56 KG/M2 | WEIGHT: 253 LBS | HEIGHT: 62 IN

## 2021-06-02 VITALS — BODY MASS INDEX: 46.82 KG/M2 | WEIGHT: 256 LBS

## 2021-06-02 VITALS — BODY MASS INDEX: 47.11 KG/M2 | WEIGHT: 256 LBS | HEIGHT: 62 IN

## 2021-06-02 VITALS — HEIGHT: 62 IN | WEIGHT: 259.8 LBS | BODY MASS INDEX: 47.81 KG/M2

## 2021-06-02 VITALS — BODY MASS INDEX: 46.74 KG/M2 | HEIGHT: 62 IN | WEIGHT: 254 LBS

## 2021-06-02 NOTE — TELEPHONE ENCOUNTER
"RN Assessment/Reason for Call:   Okay to leave Detailed Message  Patient calling in, discharge from hospital ; perforated esophagus  Pain med's aren't helping.  Feels shaky and winded.Cant walk. Hasn't eat for a week, fulliquid diet.  Taking ibuprofen and oxycodone every 4 hours as needed.   Shortness of breath 6/10, lives alone.  [facility]; no oxycodone on med list.   She was  transferred [facility].  Call transferred to [facility] triage nurses.  Lips pinkish /blue, \"normal color\"  Chest pain 6/10 after procedures; comes and goes. Couple minutes.  Pulse is slow.  Put a stent in her esophagus.    RN Action/Disposition:  She will call 911; noone to take her to ER.  Call back if worse symptoms  Agrees to plan    Lola Thomas, CARMEN    Care Connection Triage/med refill  10/12/2019  12:38 PM      Reason for Disposition    Difficulty breathing    Protocols used: CHEST PAIN-A-AH      "

## 2021-06-02 NOTE — ANESTHESIA POSTPROCEDURE EVALUATION
Patient: Nevin Alvarado  ESOPHAGOGASTRODUODENOSCOPY (EGD), REMOVAL OF FOREIGN BODY  Anesthesia type: general    Patient location: PACU  Last vitals:   Vitals Value Taken Time   /74 10/3/2019 10:00 PM   Temp 36.7  C (98  F) 10/3/2019  9:57 PM   Pulse 83 10/3/2019 10:01 PM   Resp 16 10/3/2019 10:01 PM   SpO2 100 % 10/3/2019 10:01 PM   Vitals shown include unvalidated device data.  Post vital signs: stable  Level of consciousness: awake and responds to simple questions  Post-anesthesia pain: pain controlled  Post-anesthesia nausea and vomiting: no  Pulmonary: unassisted, return to baseline  Cardiovascular: stable and blood pressure at baseline  Hydration: adequate  Anesthetic events: no    QCDR Measures:  ASA# 11 - Tami-op Cardiac Arrest: ASA11B - Patient did NOT experience unanticipated cardiac arrest  ASA# 12 - Tami-op Mortality Rate: ASA12B - Patient did NOT die  ASA# 13 - PACU Re-Intubation Rate: ASA13B - Patient did NOT require a new airway mgmt  ASA# 10 - Composite Anes Safety: ASA10A - No serious adverse event    Additional Notes:

## 2021-06-02 NOTE — ANESTHESIA PREPROCEDURE EVALUATION
Anesthesia Evaluation      Patient summary reviewed   No history of anesthetic complications     Airway   Mallampati: II  Neck ROM: full   Pulmonary - normal exam    breath sounds clear to auscultation  (+) asthma    (-) sleep apnea, not a smoker                         Cardiovascular - normal exam  Exercise tolerance: > or = 4 METS  (-) angina  Rhythm: regular  Rate: normal,         Neuro/Psych    (+) depression, anxiety/panic attacks,     Comments: ptsd  Personality d/o  ADHD  Self injurious behavior  Eating d/o     Endo/Other    (+) obesity,   (-) not pregnant     GI/Hepatic/Renal    (+) GERD,        Other findings: Last ate at noon. Liquids 2pm      Dental - normal exam   (+) poor dentition and chipped                         Anesthesia Plan  Planned anesthetic: general endotracheal    ASA 3 - emergent   Induction: intravenous   Anesthetic plan and risks discussed with: patient  Anesthesia plan special considerations: rapid sequence induction, antiemetics,   Post-op plan: routine recovery

## 2021-06-03 VITALS — WEIGHT: 269 LBS | BODY MASS INDEX: 49.2 KG/M2

## 2021-06-03 VITALS — WEIGHT: 268 LBS | BODY MASS INDEX: 49.02 KG/M2

## 2021-06-03 VITALS — BODY MASS INDEX: 49.32 KG/M2 | WEIGHT: 268 LBS | HEIGHT: 62 IN

## 2021-06-03 VITALS — BODY MASS INDEX: 49.2 KG/M2 | WEIGHT: 269 LBS

## 2021-06-03 VITALS — BODY MASS INDEX: 49.32 KG/M2 | HEIGHT: 62 IN | WEIGHT: 268 LBS

## 2021-06-04 VITALS — BODY MASS INDEX: 43.9 KG/M2 | WEIGHT: 240 LBS

## 2021-06-04 VITALS — BODY MASS INDEX: 44.16 KG/M2 | HEIGHT: 62 IN | WEIGHT: 240 LBS

## 2021-06-04 VITALS
HEIGHT: 62 IN | HEIGHT: 62 IN | BODY MASS INDEX: 46.93 KG/M2 | WEIGHT: 255 LBS | BODY MASS INDEX: 46.93 KG/M2 | WEIGHT: 255 LBS

## 2021-06-04 VITALS — BODY MASS INDEX: 46.64 KG/M2 | WEIGHT: 255 LBS

## 2021-06-04 VITALS — HEIGHT: 62 IN | BODY MASS INDEX: 45.45 KG/M2 | WEIGHT: 247 LBS

## 2021-06-04 NOTE — ANESTHESIA PREPROCEDURE EVALUATION
Anesthesia Evaluation      Patient summary reviewed   No history of anesthetic complications     Airway   Mallampati: II  Neck ROM: full   Pulmonary - normal exam   (+) asthma                           Cardiovascular - negative ROS and normal exam   Neuro/Psych    (+) depression, anxiety/panic attacks,     Endo/Other    (+) obesity,      GI/Hepatic/Renal    (+) esophageal disease,            Dental - normal exam                        Anesthesia Plan  Planned anesthetic: general endotracheal  50 mg ketamine IV on induction.    ASA 3 - emergent   Induction: intravenous   Anesthetic plan and risks discussed with: patient  Anesthesia plan special considerations: video-assisted, rapid sequence induction, antiemetics,   Post-op plan: routine recovery

## 2021-06-04 NOTE — ANESTHESIA POSTPROCEDURE EVALUATION
Patient: Nevin Alvarado  ESOPHAGOGASTRODUODENOSCOPY (EGD) FOR FROEIGN BODY REMOVAL  Anesthesia type: general    Patient location: PACU  Last vitals:   Vitals Value Taken Time   /83 12/21/2019  7:00 PM   Temp 36.1  C (97  F) 12/21/2019  6:41 PM   Pulse 92 12/21/2019  7:00 PM   Resp 20 12/21/2019  7:00 PM   SpO2 97 % 12/21/2019  7:00 PM     Post vital signs: stable  Level of consciousness: awake and responds to simple questions  Post-anesthesia pain: pain controlled  Post-anesthesia nausea and vomiting: no  Pulmonary: unassisted, return to baseline  Cardiovascular: stable and blood pressure at baseline  Hydration: adequate  Anesthetic events: no    QCDR Measures:  ASA# 11 - Tami-op Cardiac Arrest: ASA11B - Patient did NOT experience unanticipated cardiac arrest  ASA# 12 - Tami-op Mortality Rate: ASA12B - Patient did NOT die  ASA# 13 - PACU Re-Intubation Rate: ASA13B - Patient did NOT require a new airway mgmt  ASA# 10 - Composite Anes Safety: ASA10A - No serious adverse event    Additional Notes:

## 2021-06-04 NOTE — ANESTHESIA CARE TRANSFER NOTE
Last vitals:   Vitals:    12/21/19 1841   BP: 147/81   Pulse: 96   Resp: 12   Temp:    SpO2: 100%     Patient's level of consciousness is awake  Spontaneous respirations: yes  Maintains airway independently: yes  Dentition unchanged: yes  Oropharynx: oropharynx clear of all foreign objects    QCDR Measures:  ASA# 20 - Surgical Safety Checklist: WHO surgical safety checklist completed prior to induction    PQRS# 430 - Adult PONV Prevention: 4558F - Pt received => 2 anti-emetic agents (different classes) preop & intraop  ASA# 8 - Peds PONV Prevention: NA - Not pediatric patient, not GA or 2 or more risk factors NOT present  PQRS# 424 - Tami-op Temp Management: 4559F - At least one body temp DOCUMENTED => 35.5C or 95.9F within required timeframe  PQRS# 426 - PACU Transfer Protocol: - Transfer of care checklist used  ASA# 14 - Acute Post-op Pain: ASA14B - Patient did NOT experience pain >= 7 out of 10

## 2021-06-05 VITALS — WEIGHT: 284 LBS | HEIGHT: 62 IN | BODY MASS INDEX: 52.26 KG/M2

## 2021-06-05 VITALS — WEIGHT: 284 LBS | BODY MASS INDEX: 52.26 KG/M2 | HEIGHT: 62 IN

## 2021-06-05 NOTE — ANESTHESIA CARE TRANSFER NOTE
Last vitals:   Vitals:    02/05/20 1705   BP: (!) 141/93   Pulse: 78   Resp: 15   Temp:    SpO2: 98%     Patient's level of consciousness is drowsy  Spontaneous respirations: yes  Maintains airway independently: yes  Dentition unchanged: yes  Oropharynx: oropharynx clear of all foreign objects    QCDR Measures:  ASA# 20 - Surgical Safety Checklist: WHO surgical safety checklist completed prior to induction    PQRS# 430 - Adult PONV Prevention: 4558F - Pt received => 2 anti-emetic agents (different classes) preop & intraop  ASA# 8 - Peds PONV Prevention: NA - Not pediatric patient, not GA or 2 or more risk factors NOT present  PQRS# 424 - Tami-op Temp Management: 4559F - At least one body temp DOCUMENTED => 35.5C or 95.9F within required timeframe  PQRS# 426 - PACU Transfer Protocol: - Transfer of care checklist used  ASA# 14 - Acute Post-op Pain: ASA14B - Patient did NOT experience pain >= 7 out of 10

## 2021-06-05 NOTE — ANESTHESIA PREPROCEDURE EVALUATION
Anesthesia Evaluation      Patient summary reviewed   No history of anesthetic complications     Airway   Mallampati: II  Neck ROM: full   Pulmonary - normal exam   (+) asthma      ROS comment: PE hx                         Cardiovascular - normal exam  ECG reviewed     ROS comment: PE hx     Neuro/Psych    (+) depression, anxiety/panic attacks,     Comments: Borderline personality d/o  H/o Suicidal overdose  Self-injurious behavior  hx foreign body ingestion  ADHD  PTSD    Endo/Other    (+) obesity (BMI 45),      GI/Hepatic/Renal      Comments: Rectal foreign body          Dental - normal exam                        Anesthesia Plan  Planned anesthetic: general LMA    ASA 3   Induction: intravenous   Anesthetic plan and risks discussed with: patient  Anesthesia plan special considerations: antiemetics,   Post-op plan: routine recovery

## 2021-06-05 NOTE — ANESTHESIA POSTPROCEDURE EVALUATION
Patient: Nevin Alvarado  Procedure(s):  EXAM UNDER ANESTHESIA, Flexible Sigmoidoscopy, Retrieval of Foreign Body  Anesthesia type: general    Patient location: PACU  Last vitals:   Vitals Value Taken Time   /94 2/5/2020  5:15 PM   Temp 36.6  C (97.8  F) 2/5/2020  5:05 PM   Pulse 85 2/5/2020  5:27 PM   Resp 18 2/5/2020  5:27 PM   SpO2 97 % 2/5/2020  5:27 PM   Vitals shown include unvalidated device data.  Post vital signs: stable  Level of consciousness: awake, alert and responds to simple questions  Post-anesthesia pain: pain controlled  Post-anesthesia nausea and vomiting: no  Pulmonary: unassisted, nasal cannula  Cardiovascular: stable and blood pressure at baseline  Hydration: adequate  Anesthetic events: no    QCDR Measures:  ASA# 11 - Tami-op Cardiac Arrest: ASA11B - Patient did NOT experience unanticipated cardiac arrest  ASA# 12 - Tami-op Mortality Rate: ASA12B - Patient did NOT die  ASA# 13 - PACU Re-Intubation Rate: ASA13B - Patient did NOT require a new airway mgmt  ASA# 10 - Composite Anes Safety: ASA10A - No serious adverse event    Additional Notes:

## 2021-06-09 NOTE — ANESTHESIA POSTPROCEDURE EVALUATION
Patient: Nevin Alvarado  Procedure(s):  ESOPHAGOGASTRODUODENOSCOPY (EGD)  Anesthesia type: MAC    Patient location: PACU  Last vitals:   Vitals Value Taken Time   /68 7/23/2020 12:00 AM   Temp 36.7  C (98.1  F) 7/22/2020 11:46 PM   Pulse 87 7/23/2020 12:14 AM   Resp 18 7/23/2020 12:14 AM   SpO2 100 % 7/23/2020 12:14 AM   Vitals shown include unvalidated device data.  Post vital signs: stable  Level of consciousness: awake and responds to simple questions  Post-anesthesia pain: pain controlled  Post-anesthesia nausea and vomiting: no  Pulmonary: unassisted, return to baseline  Cardiovascular: stable and blood pressure at baseline  Hydration: adequate  Anesthetic events: no    QCDR Measures:  ASA# 11 - Tami-op Cardiac Arrest: ASA11B - Patient did NOT experience unanticipated cardiac arrest  ASA# 12 - Tami-op Mortality Rate: ASA12B - Patient did NOT die  ASA# 13 - PACU Re-Intubation Rate: NA - No ETT / LMA used for case  ASA# 10 - Composite Anes Safety: ASA10A - No serious adverse event    Additional Notes:

## 2021-06-09 NOTE — ANESTHESIA PREPROCEDURE EVALUATION
Anesthesia Evaluation      Patient summary reviewed   No history of anesthetic complications     Airway   Mallampati: II  Neck ROM: full   Pulmonary - normal exam   (+) asthma                           Cardiovascular - negative ROS and normal exam   Neuro/Psych    (+) depression, anxiety/panic attacks,     Endo/Other    (+) obesity (bmi 46),      GI/Hepatic/Renal    (+) esophageal disease,       Other findings: Ingested mickie pins        Dental - normal exam                          Anesthesia Plan  Planned anesthetic: MAC  Versed/fentanyl/propofol  Ketamine PRN  Decadron/zofran     ASA 3 - emergent   Induction: intravenous   Anesthetic plan and risks discussed with: patient  Anesthesia plan special considerations: antiemetics,   Post-op plan: routine recovery      covid pcr negative 7/18/2020

## 2021-06-09 NOTE — ANESTHESIA CARE TRANSFER NOTE
Last vitals:   Vitals:    07/22/20 2346   BP: 138/74   Pulse: 90   Resp: 16   Temp: 36.7  C (98.1  F)   SpO2: 99%     Patient's level of consciousness is drowsy  Spontaneous respirations: yes  Maintains airway independently: yes  Dentition unchanged: yes  Oropharynx: oropharynx clear of all foreign objects    QCDR Measures:  ASA# 20 - Surgical Safety Checklist: WHO surgical safety checklist completed prior to induction    PQRS# 430 - Adult PONV Prevention: 4558F - Pt received => 2 anti-emetic agents (different classes) preop & intraop  ASA# 8 - Peds PONV Prevention: NA - Not pediatric patient, not GA or 2 or more risk factors NOT present  PQRS# 424 - Tami-op Temp Management: NA - MAC anesthesia or case < 60 minutes  PQRS# 426 - PACU Transfer Protocol: - Transfer of care checklist used  ASA# 14 - Acute Post-op Pain: ASA14A - Patient experienced pain >= 7 out of 10

## 2021-06-10 ENCOUNTER — NURSE TRIAGE (OUTPATIENT)
Dept: NURSING | Facility: CLINIC | Age: 31
End: 2021-06-10

## 2021-06-10 NOTE — ANESTHESIA CARE TRANSFER NOTE
Last vitals:   Vitals:    08/14/20 2311   BP: 131/71   Pulse: (!) 101   Resp: 18   Temp: 37  C (98.6  F)   SpO2: 99%     Patient's level of consciousness is drowsy  Spontaneous respirations: yes  Maintains airway independently: yes  Dentition unchanged: yes  Oropharynx: oropharynx clear of all foreign objects    QCDR Measures:  ASA# 20 - Surgical Safety Checklist: WHO surgical safety checklist completed prior to induction    PQRS# 430 - Adult PONV Prevention: 4558F - Pt received => 2 anti-emetic agents (different classes) preop & intraop  ASA# 8 - Peds PONV Prevention: NA - Not pediatric patient, not GA or 2 or more risk factors NOT present  PQRS# 424 - Tami-op Temp Management: 4559F - At least one body temp DOCUMENTED => 35.5C or 95.9F within required timeframe  PQRS# 426 - PACU Transfer Protocol: - Transfer of care checklist used  ASA# 14 - Acute Post-op Pain: ASA14B - Patient did NOT experience pain >= 7 out of 10

## 2021-06-10 NOTE — H&P
Pre-procedure Note    Reason for procedure: Ingested foreign body (paperclip)    History and Physical Reviewed: Reviewed, no changes.    Pre-sedation assessment:    General: alert, appears stated age and cooperative  Airway: normal  Heart: regular rate and rhythm, S1, S2 normal, no murmur, click, rub or gallop  Lungs: clear to auscultation bilaterally    Previous reaction to sedation:     Sedation Plan based on assessment: Moderate    ASA Classification: ASA 2 - Patient with mild systemic disease with no functional limitations    Impression: Patient deemed adequate candidate for conscious sedation    Plan: esophagogastroduodenoscopy      Jeffy Zuñiga MD  8/14/2020 10:16 PM  [facility]
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
## 2021-06-10 NOTE — OR NURSING
Dr Yates, Anesthesia, ordered nursing to call Dr. Zuñiga to tell him that the patient needed to stay overnight.  Dr. Zuñiga was paged and notified of this order.  He was also notified that the patient is going to leave AMA and call an Uber to take her home.

## 2021-06-10 NOTE — ED NOTES
Patients belongings were sealed in a bag and taped with security, in front of the patient. Security did wand the patient, she is still considered medical but camera and door locked for safety.

## 2021-06-10 NOTE — ED TRIAGE NOTES
Recent death in the family (  3 days ago). Pt reports that she relapsed on her self  injurious behavior ( swallowed paper clip). Pt denies being suicidal.
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
## 2021-06-10 NOTE — ANESTHESIA POSTPROCEDURE EVALUATION
Patient: Nevin Alvarado  Procedure(s):  ESOPHAGOGASTRODUODENOSCOPY (EGD) FOREIGN BODY REMOVAL  Anesthesia type: general    Patient location: PACU  Last vitals:   Vitals Value Taken Time   /81 8/15/2020 12:15 AM   Temp 37.1  C (98.8  F) 8/14/2020 11:45 PM   Pulse 79 8/15/2020 12:15 AM   Resp 20 8/15/2020 12:00 AM   SpO2 99 % 8/15/2020 12:15 AM   Vitals shown include unvalidated device data.  Post vital signs: stable  Level of consciousness: awake and responds to simple questions  Post-anesthesia pain: pain controlled  Post-anesthesia nausea and vomiting: no  Pulmonary: unassisted, return to baseline  Cardiovascular: stable and blood pressure at baseline  Hydration: adequate  Anesthetic events: no    QCDR Measures:  ASA# 11 - Tami-op Cardiac Arrest: ASA11B - Patient did NOT experience unanticipated cardiac arrest  ASA# 12 - Tami-op Mortality Rate: ASA12B - Patient did NOT die  ASA# 13 - PACU Re-Intubation Rate: NA - No ETT / LMA used for case  ASA# 10 - Composite Anes Safety: ASA10A - No serious adverse event    Additional Notes:  Patient leaving AMA, to home, via Uber.  Patient is signing AMA form.

## 2021-06-10 NOTE — OR NURSING
Patient has no one to stay with her OR to pick her up and drive her home  She lives in an apartment She wants to go home by herself with an UBER  She states that there is staff living below her and that they can check on her She is adament about not staying in the hospitial and leaving in the morning Call placed to GI physician regarding this issue Dr Trudy CASANOVA notified

## 2021-06-10 NOTE — ANESTHESIA PREPROCEDURE EVALUATION
Anesthesia Evaluation      Patient summary reviewed   No history of anesthetic complications     Airway   Mallampati: II  Neck ROM: full   Pulmonary - normal exam   (+) asthma                           Cardiovascular - negative ROS and normal exam   Neuro/Psych    (+) depression, anxiety/panic attacks,     Endo/Other    (+) obesity (bmi 46),      GI/Hepatic/Renal    (+) esophageal disease,       Other findings: Ingested mickie pins        Dental - normal exam                          Anesthesia Plan  Planned anesthetic: MAC and general endotracheal  Versed/fentanyl/propofol  Ketamine PRN  Decadron/zofran   GETA/RSI backup.    ASA 3 - emergent   Induction: intravenous   Anesthetic plan and risks discussed with: patient  Anesthesia plan special considerations: antiemetics,   Post-op plan: routine recovery      covid pcr negative 7/18/2020

## 2021-06-11 NOTE — TELEPHONE ENCOUNTER
Pt called stating she had  esophageal perforation surgery 2019, and reported she has been having trouble swallowing things for one week. Pt reported when she swallows pills it get stuck on her throat.      Per protocol pt was advised to go to the emergency room to be evaluated since there in pcp to page. Pt requested RN to page on call gastroenterology that she had surgery in 2019. Pt was informed RN cant paged because there is no assigned provider to page. Pt was advised to go to the ER, or call [facility] to have the on call provider paged for her pcp, she stated okay.      Bhargav Brunner RN  [facility]       COVID 19 Nurse Triage Plan/Patient Instructions    Please be aware that novel coronavirus (COVID-19) may be circulating in the community. If you develop symptoms such as fever, cough, or SOB or if you have concerns about the presence of another infection including coronavirus (COVID-19), please contact your health care provider or visit [email or web address].     Disposition/Instructions    ED Visit recommended. Follow protocol based instructions.     Bring Your Own Device:  Please also bring your smart device(s) (smart phones, tablets, laptops) and their charging cables for your personal use and to communicate with your care team during your visit.    Thank you for taking steps to prevent the spread of this virus.  o Limit your contact with others.  o Wear a simple mask to cover your cough.  o Wash your hands well and often.    Resources    [facility]: About COVID-19: [email or web address]    CDC: What to Do If You're Sick: www.cdc.gov/coronavirus/2019-ncov/about/steps-when-sick.html    CDC: Ending Home Isolation: www.cdc.gov/coronavirus/2019-ncov/hcp/disposition-in-home-patients.html     CDC: Caring for Someone: www.cdc.gov/coronavirus/2019-ncov/if-you-are-sick/care-for-someone.html     [facility]: Interim Guidance for Hospital Discharge to Home:  [email or web address]/diseases/[email or web address]/hcp/hospdischarge.pdf    [facility] clinical trials (COVID-19 research studies): [email or web address]     Below are the COVID-19 hotlines at the [place] [facility] ([facility]). Interpreters are available.   o For health questions: Call 876-305-5785 or 1-478.168.5842 (7 a.m. to 7 p.m.)  o For questions about schools and childcare: Call 185-171-2063 or 1-912.606.1654 (7 a.m. to 7 p.m.)       Reason for Disposition    SEVERE symptoms of pill stuck in throat or esophagus (e.g., severe pain, bleeding, or inability to swallow liquids)    Additional Information    Negative: [1] Severe difficulty swallowing (e.g., drooling or spitting) AND [2] started suddenly after taking a medicine or allergic food    Negative: Wheezing, stridor, hoarseness, or difficulty breathing    Negative: [1] Swollen tongue AND [2] sudden onset    Negative: Sounds like a life-threatening emergency to the triager    Negative: Mouth ulcers are seen    Negative: Sore throat (throat pain with swallowing)    Negative: Swallowed a (non-edible) foreign body    Negative: Feeding tube, questions or concerns related to    Negative: Swelling of tongue    Negative: SEVERE difficulty swallowing (e.g., drooling or spitting, can't swallow water)    Negative: [1] Symptoms of blocked esophagus (e.g., can't swallow normal secretions, drooling) AND [2] present now    Negative: Symptoms of food or bone stuck in throat or esophagus (e.g., pain in throat or chest, FB sensation, blood-tinged saliva)    Protocols used: SWALLOWING DIFFICULTY-A-AH

## 2021-06-16 NOTE — ANESTHESIA PREPROCEDURE EVALUATION
Anesthesia Evaluation      Patient summary reviewed   No history of anesthetic complications     Airway   Mallampati: II  Neck ROM: full   Pulmonary - normal exam   (+) asthma                           Cardiovascular - negative ROS and normal exam   Neuro/Psych    (+) depression, anxiety/panic attacks,     Endo/Other    (+) obesity (bmi 46),      GI/Hepatic/Renal    (+) esophageal disease,       Other findings: Ingested a wire      Dental - normal exam                          Anesthesia Plan  Planned anesthetic: MAC  Propofol, ketamine prn  Decadron/zofran   GETA/RSI backup.    ASA 3 - emergent   Induction: intravenous   Anesthetic plan and risks discussed with: patient  Anesthesia plan special considerations: antiemetics,   Post-op plan: routine recovery

## 2021-06-16 NOTE — ANESTHESIA POSTPROCEDURE EVALUATION
Patient: Nevin Alvarado  Procedure(s):  ESOPHAGOGASTRODUODENOSCOPY (EGD)  Anesthesia type: MAC    Patient location: Phase II Recovery  Last vitals:   Vitals Value Taken Time   /75 04/20/21 0125   Temp 36.7  C (98  F) 04/20/21 0125   Pulse 95 04/20/21 0125   Resp 16 04/20/21 0125   SpO2 94 % 04/20/21 0125     Post vital signs: stable  Level of consciousness: awake and responds to simple questions  Post-anesthesia pain: pain controlled  Post-anesthesia nausea and vomiting: no  Pulmonary: unassisted  Cardiovascular: stable  Hydration: adequate  Anesthetic events: no    QCDR Measures:  ASA# 11 - Tami-op Cardiac Arrest: ASA11B - Patient did NOT experience unanticipated cardiac arrest  ASA# 12 - Tami-op Mortality Rate: ASA12B - Patient did NOT die  ASA# 13 - PACU Re-Intubation Rate: NA - No ETT / LMA used for case  ASA# 10 - Composite Anes Safety: ASA10A - No serious adverse event    Additional Notes:

## 2021-06-16 NOTE — ANESTHESIA CARE TRANSFER NOTE
Last vitals:   Vitals:    04/20/21 0057   BP: 135/69   Pulse: 98   Resp: 16   Temp:    SpO2: 98%     Patient's level of consciousness is awake  Spontaneous respirations: yes  Maintains airway independently: yes  Dentition unchanged: yes  Oropharynx: oropharynx clear of all foreign objects    QCDR Measures:  ASA# 20 - Surgical Safety Checklist: WHO surgical safety checklist completed prior to induction    PQRS# 430 - Adult PONV Prevention: NA - Not adult patient, not GA or 3 or more risk factors NOT present  ASA# 8 - Peds PONV Prevention: NA - Not pediatric patient, not GA or 2 or more risk factors NOT present  PQRS# 424 - Tami-op Temp Management: NA - MAC anesthesia or case < 60 minutes  PQRS# 426 - PACU Transfer Protocol: - Transfer of care checklist used  ASA# 14 - Acute Post-op Pain: ASA14B - Patient did NOT experience pain >= 7 out of 10

## 2021-06-21 NOTE — ANESTHESIA PREPROCEDURE EVALUATION
Anesthesia Evaluation      Patient summary reviewed   No history of anesthetic complications     Airway   Mallampati: II  Neck ROM: full   Pulmonary - normal exam                          Cardiovascular - normal exam   Neuro/Psych      Endo/Other    (+) obesity,      GI/Hepatic/Renal            Dental - normal exam                        Anesthesia Plan  Planned anesthetic: MAC    ASA 3 - emergent   Induction: intravenous   Anesthetic plan and risks discussed with: patient  Anesthesia plan special considerations: antiemetics,   Post-op plan: routine recovery

## 2021-06-21 NOTE — ANESTHESIA CARE TRANSFER NOTE
Last vitals:   Vitals:    11/10/18 0005   BP: 120/61   Pulse: 98   Resp: 20   Temp:    SpO2: 96%     Patient's level of consciousness is drowsy  Spontaneous respirations: yes  Maintains airway independently: yes  Dentition unchanged: yes  Oropharynx: oropharynx clear of all foreign objects    QCDR Measures:  ASA# 20 - Surgical Safety Checklist: WHO surgical safety checklist completed prior to induction  PQRS# 430 - Adult PONV Prevention: 4558F-8P - Pt did NOT receive => 2 anti-emetic agents  ASA# 8 - Peds PONV Prevention: NA - Not pediatric patient, not GA or 2 or more risk factors NOT present  PQRS# 424 - Tami-op Temp Management: NA - MAC anesthesia or case < 60 minutes  PQRS# 426 - PACU Transfer Protocol: - Transfer of care checklist used  ASA# 14 - Acute Post-op Pain: ASA14B - Patient did NOT experience pain >= 7 out of 10

## 2021-06-21 NOTE — ANESTHESIA PREPROCEDURE EVALUATION
Anesthesia Evaluation      Patient summary reviewed   No history of anesthetic complications     Airway   Mallampati: I  Neck ROM: full   Pulmonary - negative ROS and normal exam                          Cardiovascular - negative ROS and normal exam   Neuro/Psych    (+) depression, anxiety/panic attacks,     Endo/Other    (+) obesity (morbid),      GI/Hepatic/Renal    (+) esophageal disease,            Dental - normal exam                        Anesthesia Plan  Planned anesthetic: general endotracheal  50 mg ketamine IV on induction.    ASA 3 - emergent   Induction: intravenous   Anesthetic plan and risks discussed with: patient  Anesthesia plan special considerations: rapid sequence induction, antiemetics,   Post-op plan: routine recovery

## 2021-06-21 NOTE — ANESTHESIA PREPROCEDURE EVALUATION
Anesthesia Evaluation      Patient summary reviewed   No history of anesthetic complications     Airway   Mallampati: III  Neck ROM: full   Pulmonary - negative ROS and normal exam    breath sounds clear to auscultation                         Cardiovascular - negative ROS and normal exam  Exercise tolerance: > or = 4 METS  (-) murmur  ECG reviewed  Rhythm: regular  Rate: normal,    no murmur      Neuro/Psych    (+) depression, anxiety/panic attacks,     Comments: Borderline personality d/o  Suicidal overdose  PTSD  ADHD  Self-injurious behavior    Endo/Other    (+) obesity (BMI 46),      GI/Hepatic/Renal      Comments: Swallowed foreign body, paper clips     Other findings: history of intentional foreign body ingestions      Dental - normal exam                        Anesthesia Plan  Planned anesthetic: MAC    ASA 3     Anesthetic plan and risks discussed with: patient  Anesthesia plan special considerations: antiemetics,   Post-op plan: routine recovery

## 2021-06-21 NOTE — ANESTHESIA POSTPROCEDURE EVALUATION
Patient: Nevin Alvarado  UPPER ENDOSCOPY, FOREIGN BODY REMOVAL  Anesthesia type: MAC    Patient location: PACU  Last vitals:   Vitals:    11/10/18 0735   BP: 113/57   Pulse: 83   Resp: 18   Temp: 36.3  C (97.4  F)   SpO2: 94%     Post vital signs: stable  Level of consciousness: awake, alert and oriented  Post-anesthesia pain: pain controlled  Post-anesthesia nausea and vomiting: no  Pulmonary: unassisted, return to baseline  Cardiovascular: stable and blood pressure at baseline  Hydration: adequate  Anesthetic events: no    QCDR Measures:  ASA# 11 - Tami-op Cardiac Arrest: ASA11B - Patient did NOT experience unanticipated cardiac arrest  ASA# 12 - Tami-op Mortality Rate: ASA12B - Patient did NOT die  ASA# 13 - PACU Re-Intubation Rate: NA - No ETT / LMA used for case  ASA# 10 - Composite Anes Safety: ASA10A - No serious adverse event    Additional Notes:

## 2021-06-21 NOTE — ANESTHESIA POSTPROCEDURE EVALUATION
Patient: Nevin Alvarado  UPPER ENDOSCOPY TO REMOVE PAPER CLIPS  Anesthesia type: general    Patient location: PACU  Last vitals:   Vitals:    11/25/18 2150   BP: 112/51   Pulse: 87   Resp: 18   Temp: 36.8  C (98.3  F)   SpO2: 94%     Post vital signs: stable  Level of consciousness: awake and responds to simple questions  Post-anesthesia pain: pain controlled  Post-anesthesia nausea and vomiting: no  Pulmonary: unassisted, return to baseline  Cardiovascular: stable and blood pressure at baseline  Hydration: adequate  Anesthetic events: no    QCDR Measures:  ASA# 11 - Tami-op Cardiac Arrest: ASA11B - Patient did NOT experience unanticipated cardiac arrest  ASA# 12 - Tami-op Mortality Rate: ASA12B - Patient did NOT die  ASA# 13 - PACU Re-Intubation Rate: ASA13B - Patient did NOT require a new airway mgmt  ASA# 10 - Composite Anes Safety: ASA10A - No serious adverse event    Additional Notes: doing well, comfortable, non-combative.

## 2021-06-21 NOTE — ANESTHESIA CARE TRANSFER NOTE
Last vitals:   Vitals:    11/22/18 1030   BP: 120/80   Pulse: 80   Resp: 16   Temp:    SpO2: 97%     Patient's level of consciousness is awake  Spontaneous respirations: yes  Maintains airway independently: yes  Dentition unchanged: yes  Oropharynx: oropharynx clear of all foreign objects    QCDR Measures:  ASA# 20 - Surgical Safety Checklist: WHO surgical safety checklist completed prior to induction    PQRS# 430 - Adult PONV Prevention: NA - Not adult patient, not GA or 3 or more risk factors NOT present  ASA# 8 - Peds PONV Prevention: NA - Not pediatric patient, not GA or 2 or more risk factors NOT present  PQRS# 424 - Tami-op Temp Management: 4559F - At least one body temp DOCUMENTED => 35.5C or 95.9F within required timeframe  PQRS# 426 - PACU Transfer Protocol:NA - Patient did not go to PACU  ASA# 14 - Acute Post-op Pain: ASA14B - Patient did NOT experience pain >= 7 out of 10

## 2021-06-21 NOTE — ANESTHESIA CARE TRANSFER NOTE
Last vitals:   Vitals:    11/25/18 1840   BP: (!) 160/92   Pulse: 100   Resp: 22   Temp: 36.8  C (98.2  F)   SpO2: 93%     Patient's level of consciousness is awake and drowsy  Spontaneous respirations: yes  Maintains airway independently: yes  Dentition unchanged: yes  Oropharynx: oropharynx clear of all foreign objects    QCDR Measures:  ASA# 20 - Surgical Safety Checklist: WHO surgical safety checklist completed prior to induction    PQRS# 430 - Adult PONV Prevention: 4558F - Pt received => 2 anti-emetic agents (different classes) preop & intraop  ASA# 8 - Peds PONV Prevention: NA - Not pediatric patient, not GA or 2 or more risk factors NOT present  PQRS# 424 - Tami-op Temp Management: 4559F - At least one body temp DOCUMENTED => 35.5C or 95.9F within required timeframe  PQRS# 426 - PACU Transfer Protocol: - Transfer of care checklist used  ASA# 14 - Acute Post-op Pain: ASA14A - Patient experienced pain >= 7 out of 10

## 2021-06-21 NOTE — ANESTHESIA POSTPROCEDURE EVALUATION
Patient: Nvein Alvarado  ESOPHAGOGASTRODUODENOSCOPY (EGD)  Anesthesia type: MAC    Patient location: [facility] Surgical Floor  Last vitals:   Vitals:    11/22/18 1030   BP: 120/80   Pulse: 80   Resp: 16   Temp:    SpO2: 97%     Post vital signs: stable  Level of consciousness: awake and responds to simple questions  Post-anesthesia pain: pain controlled  Post-anesthesia nausea and vomiting: no  Pulmonary: unassisted, return to baseline  Cardiovascular: stable and blood pressure at baseline  Hydration: adequate  Anesthetic events: no    QCDR Measures:  ASA# 11 - Tami-op Cardiac Arrest: ASA11B - Patient did NOT experience unanticipated cardiac arrest  ASA# 12 - Tami-op Mortality Rate: ASA12B - Patient did NOT die  ASA# 13 - PACU Re-Intubation Rate: ASA13B - Patient did NOT require a new airway mgmt  ASA# 10 - Composite Anes Safety: ASA10A - No serious adverse event    Additional Notes:

## 2021-06-23 NOTE — ANESTHESIA POSTPROCEDURE EVALUATION
Patient: Nevin Alvarado  ESOPHAGOGASTRODUODENOSCOPY (EGD) removal of foreign body  Anesthesia type: MAC    Patient location: [facility] Surgical Floor  Last vitals:   Vitals:    01/25/19 2322   BP: 141/73   Pulse: 68   Resp: 20   Temp:    SpO2: 100%     Post vital signs: stable  Level of consciousness: awake and responds to simple questions  Post-anesthesia pain: pain controlled  Post-anesthesia nausea and vomiting: no  Pulmonary: unassisted, return to baseline  Cardiovascular: stable and blood pressure at baseline  Hydration: adequate  Anesthetic events: no    QCDR Measures:  ASA# 11 - Tami-op Cardiac Arrest: ASA11B - Patient did NOT experience unanticipated cardiac arrest  ASA# 12 - Tami-op Mortality Rate: ASA12B - Patient did NOT die  ASA# 13 - PACU Re-Intubation Rate: ASA13B - Patient did NOT require a new airway mgmt  ASA# 10 - Composite Anes Safety: ASA10A - No serious adverse event    Additional Notes:

## 2021-06-23 NOTE — ANESTHESIA CARE TRANSFER NOTE
Last vitals:   Vitals:    01/25/19 2322   BP: 141/73   Pulse: 68   Resp: 20   Temp:    SpO2: 100%     Patient's level of consciousness is awake  Spontaneous respirations: yes  Maintains airway independently: yes  Dentition unchanged: yes  Oropharynx: oropharynx clear of all foreign objects    QCDR Measures:  ASA# 20 - Surgical Safety Checklist: WHO surgical safety checklist completed prior to induction    PQRS# 430 - Adult PONV Prevention: 4558F - Pt received => 2 anti-emetic agents (different classes) preop & intraop  ASA# 8 - Peds PONV Prevention: NA - Not pediatric patient, not GA or 2 or more risk factors NOT present  PQRS# 424 - Tami-op Temp Management: 4559F - At least one body temp DOCUMENTED => 35.5C or 95.9F within required timeframe  PQRS# 426 - PACU Transfer Protocol: - Transfer of care checklist used  ASA# 14 - Acute Post-op Pain: ASA14B - Patient did NOT experience pain >= 7 out of 10

## 2021-06-23 NOTE — ANESTHESIA POSTPROCEDURE EVALUATION
Patient: Nevin Alvarado  ESOPHAGOGASTRODUODENOSCOPY (EGD)  Anesthesia type: MAC    Patient location: PACU  Last vitals:   Vitals:    01/31/19 1917   BP: 134/73   Pulse: (!) 111   Resp: 18   Temp: 37.6  C (99.7  F)   SpO2: 98%     Post vital signs: stable  Level of consciousness: awake and responds to simple questions  Post-anesthesia pain: pain controlled  Post-anesthesia nausea and vomiting: no  Pulmonary: unassisted, return to baseline  Cardiovascular: stable and blood pressure at baseline  Hydration: adequate  Anesthetic events: no    QCDR Measures:  ASA# 11 - Tami-op Cardiac Arrest: ASA11B - Patient did NOT experience unanticipated cardiac arrest  ASA# 12 - Tami-op Mortality Rate: ASA12B - Patient did NOT die  ASA# 13 - PACU Re-Intubation Rate: NA - No ETT / LMA used for case  ASA# 10 - Composite Anes Safety: ASA10A - No serious adverse event    Additional Notes:

## 2021-06-23 NOTE — ANESTHESIA PREPROCEDURE EVALUATION
Anesthesia Evaluation      Patient summary reviewed   No history of anesthetic complications     Airway   Mallampati: III  Neck ROM: full   Pulmonary - normal exam                          Cardiovascular - normal exam   Neuro/Psych      Endo/Other    (+) obesity,      GI/Hepatic/Renal            Dental - normal exam                        Anesthesia Plan  Planned anesthetic: MAC    ASA 3 - emergent     Anesthetic plan and risks discussed with: patient    Post-op plan: routine recovery

## 2021-06-23 NOTE — ANESTHESIA PREPROCEDURE EVALUATION
Anesthesia Evaluation      Patient summary reviewed   No history of anesthetic complications     Airway   Mallampati: III  Neck ROM: full   Pulmonary - normal exam    breath sounds clear to auscultation                         Cardiovascular - normal exam  Exercise tolerance: > or = 4 METS  Rhythm: regular  Rate: normal,         Neuro/Psych    (+) depression, anxiety/panic attacks,     Comments: ptsd  Personality d/o    Endo/Other    (+) obesity,      GI/Hepatic/Renal       Other findings: Last ate at noon. Liquids 2pm      Dental - normal exam   (+) poor dentition and chipped                         Anesthesia Plan  Planned anesthetic: MAC    ASA 3 - emergent     Anesthetic plan and risks discussed with: patient    Post-op plan: routine recovery

## 2021-06-23 NOTE — ANESTHESIA CARE TRANSFER NOTE
Last vitals:   Vitals:    01/31/19 1917   BP: 134/73   Pulse: (!) 111   Resp: 18   Temp: 37.6  C (99.7  F)   SpO2: 98%     Patient's level of consciousness is awake  Spontaneous respirations: yes  Maintains airway independently: yes  Dentition unchanged: yes  Oropharynx: oropharynx clear of all foreign objects    QCDR Measures:  ASA# 20 - Surgical Safety Checklist: WHO surgical safety checklist completed prior to induction    PQRS# 430 - Adult PONV Prevention: 4558F - Pt received => 2 anti-emetic agents (different classes) preop & intraop  ASA# 8 - Peds PONV Prevention: NA - Not pediatric patient, not GA or 2 or more risk factors NOT present  PQRS# 424 - Tami-op Temp Management: NA - MAC anesthesia or case < 60 minutes  PQRS# 426 - PACU Transfer Protocol: - Transfer of care checklist used  ASA# 14 - Acute Post-op Pain: ASA14B - Patient did NOT experience pain >= 7 out of 10

## 2021-07-01 NOTE — CONSULTS
Consults by Maranda Robles LICSW at 8/14/2020 10:19 PM     Author: Maranad Robles LICSW Service: Behavioral Author Type:     Filed: 8/14/2020 10:57 PM Date of Service: 8/14/2020 10:19 PM Status: Signed    : Maranda Robles LICSW (Licensed Social Worker)     Consult Orders    1. Consult to Care Management / Social Work - Crisis Consult [[id number]] ordered by Siddharth Kearns DO at 08/14/20 1941                Crisis Social Work Assessment         DATE OF SERVICE      8/14/2020    Pt consented to video consult.   Order received: 1942   Consult started:   2100 due to pt going to ay  Patients Originating Emergency Department: [facility]       PRESENTING PROBLEM / PERTINENT HISTORY AND COLLATERAL     Nevin Alvarado is a 29 y.o. female being seen by Crisis Social Work through tele-medicine video consult. Pt is talkative, cooperative, and oriented during interview. She does not appear to be in distress physically or emotionally, although she acknowledges intermittent sadness and grief.     Pt reports feeling sad and grieving the loss of grandmother, as well as grief over the past month due to the one year anniversary of her nephew's death. Pt denies other symptoms of depression and anxiety being more prevalent recently. She describes the behavior of swallowing objects as a coping skill, an addiction, and that it occurs impulsively. Pt does report attempting to contact people in her safety plan without success, and also did not successfully use coping skills prior to swallowing the notebook binding prior to admission. She denies this was a suicide attempt and denies suicidal ideation. Pt reports having success over 7 months without swallowing objects and believes she can do this again.     Pt has a history of multiple incidences of swallowing objects. She describes the behavior as a poor coping skill that has become and addiction. She relapsed about one month ago and had 3  incidences in July, went a few weeks without until this.     Most recent psychiatric admissions include 3/29/20 at [facility] and 11/28/18 at [facility] for self harm behaviors.     Pt is able to report multiple coping skills and demonstrates adequate insight into her behavior and changes she can make. She is also hopeful for a new medication she was prescribed this week by her psychiatrist. Writer and pt review safety plan, additional coping strategies, and additional resources she can utilized such as the [place] Warm Line and [facility] support groups in person and online. These are provided to her directly and also entered on her AVS.     Discussed case with treatment team and attending MD agrees with discharge plan at this time.     Current Symptoms:      Depression Symptoms: Sad, depressed mood       Manic Symptoms: No problems reported or observed    Psychotic Symptoms:   No none observed or reported      Anxiety Symptoms: No problems reported or observed    If any symptoms, how difficult have these symptoms made it for you to work, take care of things at home, or get along with other people?   Somewhat difficult         SUBSTANCE USE HISTORY:     Is there a history of, or current substance use? None reported by patient      reports that she has never smoked. She has never used smokeless tobacco. She reports that she does not drink alcohol or use drugs.    Previous Treatment: No  Drug screen/BAL/Breathalyzer completed (date/results):   no medical necessity determined for testing by attending doctor         PSYCHIATRIC HISTORY AND CURRENT CARE:     Is there a history of mental health concerns?  Yes  History of Commitment: None reported by patient   History of Psychiatric Hospitalizations (dates):  Yes, Date: 3/29/20 and 11/28/18, Location: [facility]  History of PHP/Day Treatment/Outpatient Group Therapy (dates): Pt currently doing DBT at [facility]  History of ECT (dates): None reported by  patient   Current psychotropic medications:  see medication list ; pt reports recent addition of naltrexone but pt has not started the new medication yet.     Psychiatrist:  Yes: Linda De La Rosa at [facility]  Therapist:  Yes: Donna at [facility]  :  Yes: CADI waiver with ILS worker and   ARMHS:  No  ACT Team:  No         MEDICAL HISTORY AND CURRENT MEDICAL CARE:     Hx of falls (per patient report) : No  Hx of seizure (per patient report) : No  Health Problems/Concerns: See medical record   Recent Changes in medications:   yes addition of Naltrexone, has not yet started this med  Medication Compliant (If not, is the patient off all or csome of medications; how long without;issues effecting compliance):yes, patient reports compliance  Providers: Patient Care Team:  Latonya Knight MD as PCP - General (Family Medicine)          FAMILY HISTORY:     MI/CD Family History:  Yes         SOCIAL HISTORY:     Living Situation:   Living Arrangements: Alone  Type of Residence: Private residence    Marital Status: single  Children: No        Abuse History:  Yes  Tenriism Beliefs:   none reported by patient  Education: student no GED/Diploma  Employment: Disabled/unable to work   Income: disability income     Additional Legal Issues: none reported by patient  Culture: Cultural Impact on Health and Healthcare include: Patient utilizes [facility] for mental and physical health needs.     Community/Family Significant Supports/Legal Guardian/POA:  Support System: sister lives next door; mother; supportive professionals like ILS worker and     Patient identified Strengths and Effective Coping Skills: (What is going well?)  DBT skills, lives independently     Extended Emergency Contact Information  Primary Emergency Contact: Clarita Alvarado   [facility]  Home Phone: [phone]  Mobile Phone: [phone]  Relation: Mother  Secondary Emergency Contact: Cheryl Pryor  Work Phone:  [phone]  Relation: Other         MENTAL STATUS EXAM AND RISK ASSESSMENT:     Risk Assessment  Attempted suicide within last 30 days?: No         Attempting or threatening suicide/self harm?: No    Expressing suicidal/self harm thoughts without intent?: No    Do you have a plan?: No    Hx of Suicidal Attempts?: Yes (comment)(2014 by overdose)    Recent Psychological Experiences: Loss (Comment)(loss of Grandmother earlier this week)    Current self Injurious behavior?: Yes (comment)(swallowed metal notebook binding prior to ED admission)    Hx of Self injurious behavior?: Yes (comment)    Current plan to harm another?: No      Do you have access to weapons?: No    Assaultive behavior: no problem          Suicidal Risk Factors: Recent loss  1A. Over the past 2 weeks, have you had thoughts of killing yourself?  No  1B. Have you ever attempted to kill yourself and, if yes, when did this last happen? Yes: 2014 overdose in [facility], was hospitalized   (If the answer to this question is Yes AND the attempt was within the past 6 months, this   item is positive).   (If Yes to 1A AND 1B score = 1       All other responses = 0)   2. Recent or current suicide plan? No  (Suggested wording: Have you been thinking about how you might kill yourself?)   3. Recent or current intent to act on ideation? No  (Suggested wording: Have you had some intention of acting on your thoughts?)   4. Lifetime psychiatric hospitalization? Yes, Date: 3/29/20, Location: [facility]  (Suggested wording: Have you ever been hospitalized for a mental health or substance use problem?)   5. Pattern of excessive substance use?  No (see chemical   (Suggested wording: Has drinking or drug abuse ever been a problem for you? Or a positive on CAGE or other standardized substance use screener).   6. Current irritability, agitation, or aggression? No  (Source: Clinical observation, collateral report)      Total Score: 2/6   Critical Review items-     Current Attempt? No    Suicide Plan Present? No   Intent Present? No        Conclude risk level based on highest category endorsed in any row (mild/moderate/High)? Mild  Active cutting or other SIB in department: No  Aggressive/assaultive behavior: No  Sexually inappropriate behavior or sexual vulnerability: No  Elopement risk: No    Mental Status Exam:  Appearance : Appropriate  Motor Activity:  Within Normal Limits  Eye Contact: Engaged  Orientation Person: Yes  Orientation Situation: Yes  Orientation Place: Yes  Orientation Time: Yes  Memory Recent: Intact  Memory Remote: Intact  Mood : Calm, Congruent  Affect: Appropriate  Thought Content: Clear  Fund of Knowledge: Appropriate     Attention Span: Adequate  Behavior Toward Examiner/   Speech Content: Fluent  Speech Rate/Production: Normal  Speech Volume: Normal  Judgement: Impairment Minimal  Estimated Intelligence:  Below Average   Insight: Adequate        ASSESSMENT AND DIAGNOSIS     Clinical Summary (explains the provisional diagnostic hypothesis including your justification or reasons for applying the diagnosis: Pt reports feeling sad and grieving the loss of grandmother, as well as grief over the past month due to the one year anniversary of her nephew's death. Pt denies other symptoms of depression and anxiety being more prevalent recently. She describes the behavior of swallowing objects as a coping skill, an addiction, and that it occurs impulsively. Pt does report attempting to contact people in her safety plan without success, and also did not successfully use coping skills prior to swallowing the notebook binding prior to admission. She denies this was a suicide attempt and denies suicidal ideation. Pt reports having success over 7 months without swallowing objects and believes she can do this again.     Diagnosis: Borderline Personality Disorder; History of PTSD, Major Depressive Disorder, Anxiety, Attention Deficit Disorder       PLAN     Level of Care  Summary:  Recommended level of care: outpatient with therapy, supportive services  Does the patient agree with the recommended level of care?    Yes  ED Physician consulted Name:  Dr. Kearns  ED Physician concurs with disposition:   Yes  Psychiatrist consulted?consult not required  Psychiatrist consulted Name:Consult not required  Psychiatrist concurs with disposition?   consult not required    Final disposition:  Outpatient  If Inpatient, is patient admitted voluntary?  Yes    Patient aware of potential for transfer if there are not appropriate placement: N/A    Follow-Up Plan:  Safety plan provided to the parent/guardian: Plan reviewed with patient who is adult/primary decision maker for care  Resources provided?  Yes  Appointments pending or scheduled (provider, date, time location, etc.): therapy and DBT to continue  Legal Status: Voluntary status,   Issue ,    Expires             SOURCES     Referral Information:  Referral Source: Physician  Referral Name: Dr. Kearns        Patient Identified Liabilities: Recent Loss    Psychotherapy techniques and/or interventions used:  Assess dimensions of crisis, apply solution-focused therapy to address current crisis, identify additional supports and alternative coping skills, and establish a safety plan.     Rationale for intervention/technique: Used to improve patient safety, draw on patient's strengths, assess resources, and facilitate return to normal functioning.     Collateral Information/Sources:  Collateral Information: Yes  Collateral Sources: Medical Records  Details (name and phone number of collateral contact and information provided): none       SIGNATURE     Cumulative face to face time with patient in minutes: 45 minutes  Type of Assessment: Crisis Psychotherapy  Performed and Documented by: ALLAN Ramsey   8/14/2020 10:19 PM

## 2021-07-03 NOTE — ADDENDUM NOTE
Addendum Note by Clara Winston MD at 10/3/2019 10:24 PM     Author: Clara Winston MD Service: -- Author Type: Physician    Filed: 10/3/2019 10:24 PM Date of Service: 10/3/2019 10:24 PM Status: Signed    : Clara Winston MD (Physician)       Addendum  created 10/03/19 2224 by Clara Winston MD    Order list changed, Order sets accessed

## 2021-07-04 ENCOUNTER — APPOINTMENT (OUTPATIENT)
Dept: GENERAL RADIOLOGY | Facility: CLINIC | Age: 31
End: 2021-07-04
Attending: INTERNAL MEDICINE
Payer: COMMERCIAL

## 2021-07-04 ENCOUNTER — HOSPITAL ENCOUNTER (EMERGENCY)
Facility: CLINIC | Age: 31
Discharge: HOME | End: 2021-07-04
Attending: INTERNAL MEDICINE | Admitting: INTERNAL MEDICINE
Payer: COMMERCIAL

## 2021-07-04 VITALS
TEMPERATURE: 98.4 F | SYSTOLIC BLOOD PRESSURE: 145 MMHG | OXYGEN SATURATION: 96 % | RESPIRATION RATE: 18 BRPM | DIASTOLIC BLOOD PRESSURE: 82 MMHG | HEART RATE: 99 BPM

## 2021-07-04 DIAGNOSIS — S56.911S ELBOW STRAIN, RIGHT, SEQUELA: ICD-10-CM

## 2021-07-04 DIAGNOSIS — M79.601 PAIN OF RIGHT UPPER EXTREMITY: ICD-10-CM

## 2021-07-04 PROCEDURE — 99284 EMERGENCY DEPT VISIT MOD MDM: CPT | Performed by: INTERNAL MEDICINE

## 2021-07-04 PROCEDURE — 73070 X-RAY EXAM OF ELBOW: CPT | Mod: RT

## 2021-07-04 PROCEDURE — 250N000013 HC RX MED GY IP 250 OP 250 PS 637: Performed by: INTERNAL MEDICINE

## 2021-07-04 PROCEDURE — 73030 X-RAY EXAM OF SHOULDER: CPT | Mod: 26 | Performed by: RADIOLOGY

## 2021-07-04 PROCEDURE — 73030 X-RAY EXAM OF SHOULDER: CPT | Mod: RT

## 2021-07-04 PROCEDURE — 73070 X-RAY EXAM OF ELBOW: CPT | Mod: 26 | Performed by: RADIOLOGY

## 2021-07-04 RX ORDER — HYDROMORPHONE HYDROCHLORIDE 2 MG/1
2 TABLET ORAL ONCE
Status: COMPLETED | OUTPATIENT
Start: 2021-07-04 | End: 2021-07-04

## 2021-07-04 RX ORDER — LIDOCAINE 50 MG/G
2 PATCH TOPICAL EVERY 24 HOURS
Qty: 20 PATCH | Refills: 0 | Status: SHIPPED | OUTPATIENT
Start: 2021-07-04 | End: 2021-07-14

## 2021-07-04 RX ORDER — LIDOCAINE 4 G/G
1 PATCH TOPICAL ONCE
Status: DISCONTINUED | OUTPATIENT
Start: 2021-07-04 | End: 2021-07-04 | Stop reason: HOSPADM

## 2021-07-04 RX ADMIN — HYDROMORPHONE HYDROCHLORIDE 2 MG: 2 TABLET ORAL at 18:57

## 2021-07-04 RX ADMIN — LIDOCAINE 1 PATCH: 560 PATCH PERCUTANEOUS; TOPICAL; TRANSDERMAL at 21:00

## 2021-07-04 RX ADMIN — LIDOCAINE 1 PATCH: 560 PATCH PERCUTANEOUS; TOPICAL; TRANSDERMAL at 21:01

## 2021-07-04 ASSESSMENT — ENCOUNTER SYMPTOMS
CONFUSION: 0
WEAKNESS: 0
JOINT SWELLING: 1
COLOR CHANGE: 0
ARTHRALGIAS: 1
NUMBNESS: 1
SHORTNESS OF BREATH: 0
ABDOMINAL PAIN: 0
NECK PAIN: 0
FEVER: 0
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
## 2021-07-04 NOTE — ED PROVIDER NOTES
"ED Provider Note  [facility]      History     Chief Complaint   Patient presents with     Arm Pain     HPI  Nevin Alvarado is a 30 year old female with a past medical history significant for***who presents to the Emergency Department for evaluation of right arm pain.  Patient was recently seen at [facility]    {Past History:533642}      Review of Systems  {Complete vs limited ROS:103914::\"A complete review of systems was performed with pertinent positives and negatives noted in the HPI, and all other systems negative.\"}    Physical Exam   BP: (!) 158/85  Pulse: 101  Temp: 98.9  F (37.2  C)  Resp: 18  SpO2: 96 %  Physical Exam  ***  ED Course      Procedures        {ED Course Selections:221417}       No results found for any visits on 07/04/21.  Medications - No data to display     Assessments & Plan (with Medical Decision Making)   ***    I have reviewed the nursing notes. I have reviewed the findings, diagnosis, plan and need for follow up with the patient.    New Prescriptions    No medications on file       Final diagnoses:   None       --  ***  [facility] EMERGENCY DEPARTMENT  7/4/2021  "

## 2021-07-04 NOTE — ED TRIAGE NOTES
Patient reports right arm pain that started when she woke up one morning last week. Seen in urgent care, no fracture noted. Still complains of arm pain.

## 2021-07-05 NOTE — DISCHARGE INSTRUCTIONS
Lidoderm 2 patches to the right arm daily.  Diclofenac gel to the right elbow 4 times/ day.  Follow up with the Orthopedics clinic this week. You should receive a call Monday or Tuesday to set up the appointment.
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
## 2021-07-12 ENCOUNTER — OFFICE VISIT (OUTPATIENT)
Dept: ORTHOPEDICS | Facility: CLINIC | Age: 31
End: 2021-07-12
Payer: COMMERCIAL

## 2021-07-12 ENCOUNTER — PRE VISIT (OUTPATIENT)
Dept: ORTHOPEDICS | Facility: CLINIC | Age: 31
End: 2021-07-12

## 2021-07-12 ENCOUNTER — ANCILLARY PROCEDURE (OUTPATIENT)
Dept: GENERAL RADIOLOGY | Facility: CLINIC | Age: 31
End: 2021-07-12
Attending: FAMILY MEDICINE
Payer: COMMERCIAL

## 2021-07-12 VITALS — WEIGHT: 284 LBS | BODY MASS INDEX: 52.26 KG/M2 | HEIGHT: 62 IN

## 2021-07-12 DIAGNOSIS — M25.421 ELBOW EFFUSION, RIGHT: Primary | ICD-10-CM

## 2021-07-12 DIAGNOSIS — M25.421 ELBOW EFFUSION, RIGHT: ICD-10-CM

## 2021-07-12 PROCEDURE — 73070 X-RAY EXAM OF ELBOW: CPT | Mod: RT | Performed by: RADIOLOGY

## 2021-07-12 PROCEDURE — 99203 OFFICE O/P NEW LOW 30 MIN: CPT | Performed by: FAMILY MEDICINE

## 2021-07-12 ASSESSMENT — MIFFLIN-ST. JEOR: SCORE: 1966.47
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
## 2021-07-20 ENCOUNTER — HOSPITAL ENCOUNTER (EMERGENCY)
Dept: RADIOLOGY | Facility: HOSPITAL | Age: 31
End: 2021-07-20
Attending: EMERGENCY MEDICINE
Payer: COMMERCIAL

## 2021-07-20 PROCEDURE — 72170 X-RAY EXAM OF PELVIS: CPT

## 2021-07-20 PROCEDURE — 99285 EMERGENCY DEPT VISIT HI MDM: CPT | Mod: 25

## 2021-07-20 ASSESSMENT — MIFFLIN-ST. JEOR: SCORE: 1966.47

## 2021-07-21 ENCOUNTER — ANESTHESIA (OUTPATIENT)
Dept: SURGERY | Facility: HOSPITAL | Age: 31
End: 2021-07-21
Payer: COMMERCIAL

## 2021-07-21 ENCOUNTER — ANESTHESIA EVENT (OUTPATIENT)
Dept: SURGERY | Facility: HOSPITAL | Age: 31
End: 2021-07-21
Payer: COMMERCIAL

## 2021-07-21 ENCOUNTER — HOSPITAL ENCOUNTER (OUTPATIENT)
Facility: HOSPITAL | Age: 31
Discharge: HOME | End: 2021-07-21
Attending: EMERGENCY MEDICINE | Admitting: SURGERY
Payer: COMMERCIAL

## 2021-07-21 VITALS
TEMPERATURE: 97.3 F | OXYGEN SATURATION: 92 % | HEIGHT: 62 IN | WEIGHT: 284 LBS | RESPIRATION RATE: 15 BRPM | HEART RATE: 101 BPM | SYSTOLIC BLOOD PRESSURE: 143 MMHG | DIASTOLIC BLOOD PRESSURE: 82 MMHG | BODY MASS INDEX: 52.26 KG/M2

## 2021-07-21 DIAGNOSIS — T18.5XXA FOREIGN BODY ANUS/RECTUM, INITIAL ENCOUNTER: ICD-10-CM

## 2021-07-21 LAB
ANION GAP SERPL CALCULATED.3IONS-SCNC: 10 MMOL/L (ref 5–18)
BUN SERPL-MCNC: 13 MG/DL (ref 8–22)
CALCIUM SERPL-MCNC: 9.4 MG/DL (ref 8.5–10.5)
CHLORIDE BLD-SCNC: 105 MMOL/L (ref 98–107)
CO2 SERPL-SCNC: 26 MMOL/L (ref 22–31)
CREAT SERPL-MCNC: 0.72 MG/DL (ref 0.6–1.1)
ERYTHROCYTE [DISTWIDTH] IN BLOOD BY AUTOMATED COUNT: 15 % (ref 10–15)
GFR SERPL CREATININE-BSD FRML MDRD: >90 ML/MIN/1.73M2
GLUCOSE BLD-MCNC: 100 MG/DL (ref 70–125)
HCG SERPL QL: NEGATIVE
HCT VFR BLD AUTO: 34.2 % (ref 35–47)
HGB BLD-MCNC: 10.9 G/DL (ref 11.7–15.7)
HOLD SPECIMEN: NORMAL
INR PPP: 1.04 (ref 0.9–1.15)
Lab: NORMAL
MCH RBC QN AUTO: 27.1 PG (ref 26.5–33)
MCHC RBC AUTO-ENTMCNC: 31.9 G/DL (ref 31.5–36.5)
MCV RBC AUTO: 85 FL (ref 78–100)
PLATELET # BLD AUTO: 238 10E3/UL (ref 150–450)
POTASSIUM BLD-SCNC: 3.7 MMOL/L (ref 3.5–5)
RBC # BLD AUTO: 4.02 10E6/UL (ref 3.8–5.2)
SARS-COV-2 RNA RESP QL NAA+PROBE: NEGATIVE
SODIUM SERPL-SCNC: 141 MMOL/L (ref 136–145)
WBC # BLD AUTO: 8.6 10E3/UL (ref 4–11)

## 2021-07-21 PROCEDURE — 88300 SURGICAL PATH GROSS: CPT | Mod: TC | Performed by: SURGERY

## 2021-07-21 PROCEDURE — 250N000011 HC RX IP 250 OP 636

## 2021-07-21 PROCEDURE — 258N000003 HC RX IP 258 OP 636: Performed by: EMERGENCY MEDICINE

## 2021-07-21 PROCEDURE — 250N000011 HC RX IP 250 OP 636: Performed by: NURSE ANESTHETIST, CERTIFIED REGISTERED

## 2021-07-21 PROCEDURE — 250N000011 HC RX IP 250 OP 636: Performed by: SURGERY

## 2021-07-21 PROCEDURE — 999N000141 HC STATISTIC PRE-PROCEDURE NURSING ASSESSMENT: Performed by: SURGERY

## 2021-07-21 PROCEDURE — 96375 TX/PRO/DX INJ NEW DRUG ADDON: CPT | Mod: 59

## 2021-07-21 PROCEDURE — 250N000009 HC RX 250: Performed by: NURSE ANESTHETIST, CERTIFIED REGISTERED

## 2021-07-21 PROCEDURE — 250N000011 HC RX IP 250 OP 636: Performed by: PHYSICIAN ASSISTANT

## 2021-07-21 PROCEDURE — 85027 COMPLETE CBC AUTOMATED: CPT | Performed by: EMERGENCY MEDICINE

## 2021-07-21 PROCEDURE — 250N000011 HC RX IP 250 OP 636: Performed by: EMERGENCY MEDICINE

## 2021-07-21 PROCEDURE — 96374 THER/PROPH/DIAG INJ IV PUSH: CPT

## 2021-07-21 PROCEDURE — 250N000025 HC SEVOFLURANE, PER MIN: Performed by: SURGERY

## 2021-07-21 PROCEDURE — 36415 COLL VENOUS BLD VENIPUNCTURE: CPT | Performed by: EMERGENCY MEDICINE

## 2021-07-21 PROCEDURE — 99203 OFFICE O/P NEW LOW 30 MIN: CPT | Mod: 25 | Performed by: PHYSICIAN ASSISTANT

## 2021-07-21 PROCEDURE — 710N000009 HC RECOVERY PHASE 1, LEVEL 1, PER MIN: Performed by: SURGERY

## 2021-07-21 PROCEDURE — 80048 BASIC METABOLIC PNL TOTAL CA: CPT | Performed by: EMERGENCY MEDICINE

## 2021-07-21 PROCEDURE — 85610 PROTHROMBIN TIME: CPT | Performed by: EMERGENCY MEDICINE

## 2021-07-21 PROCEDURE — 710N000012 HC RECOVERY PHASE 2, PER MINUTE: Performed by: SURGERY

## 2021-07-21 PROCEDURE — 250N000009 HC RX 250: Performed by: EMERGENCY MEDICINE

## 2021-07-21 PROCEDURE — 87635 SARS-COV-2 COVID-19 AMP PRB: CPT | Performed by: EMERGENCY MEDICINE

## 2021-07-21 PROCEDURE — 45915 REMOVE RECTAL OBSTRUCTION: CPT | Performed by: SURGERY

## 2021-07-21 PROCEDURE — 360N000075 HC SURGERY LEVEL 2, PER MIN: Performed by: SURGERY

## 2021-07-21 PROCEDURE — 96372 THER/PROPH/DIAG INJ SC/IM: CPT | Performed by: EMERGENCY MEDICINE

## 2021-07-21 PROCEDURE — 36592 COLLECT BLOOD FROM PICC: CPT | Performed by: EMERGENCY MEDICINE

## 2021-07-21 PROCEDURE — 258N000003 HC RX IP 258 OP 636: Performed by: ANESTHESIOLOGY

## 2021-07-21 PROCEDURE — 250N000009 HC RX 250: Performed by: SURGERY

## 2021-07-21 PROCEDURE — 250N000011 HC RX IP 250 OP 636: Performed by: ANESTHESIOLOGY

## 2021-07-21 PROCEDURE — 96376 TX/PRO/DX INJ SAME DRUG ADON: CPT

## 2021-07-21 PROCEDURE — 84703 CHORIONIC GONADOTROPIN ASSAY: CPT | Performed by: EMERGENCY MEDICINE

## 2021-07-21 PROCEDURE — 370N000017 HC ANESTHESIA TECHNICAL FEE, PER MIN: Performed by: SURGERY

## 2021-07-21 RX ORDER — HYDROMORPHONE HCL IN WATER/PF 6 MG/30 ML
0.2 PATIENT CONTROLLED ANALGESIA SYRINGE INTRAVENOUS EVERY 5 MIN PRN
Status: DISCONTINUED | OUTPATIENT
Start: 2021-07-21 | End: 2021-07-21 | Stop reason: HOSPADM

## 2021-07-21 RX ORDER — LIDOCAINE HYDROCHLORIDE 20 MG/ML
INJECTION, SOLUTION INFILTRATION; PERINEURAL PRN
Status: DISCONTINUED | OUTPATIENT
Start: 2021-07-21 | End: 2021-07-21

## 2021-07-21 RX ORDER — FENTANYL CITRATE 50 UG/ML
INJECTION, SOLUTION INTRAMUSCULAR; INTRAVENOUS PRN
Status: DISCONTINUED | OUTPATIENT
Start: 2021-07-21 | End: 2021-07-21

## 2021-07-21 RX ORDER — MORPHINE SULFATE 4 MG/ML
4 INJECTION, SOLUTION INTRAMUSCULAR; INTRAVENOUS ONCE
Status: COMPLETED | OUTPATIENT
Start: 2021-07-21 | End: 2021-07-21

## 2021-07-21 RX ORDER — ONDANSETRON 2 MG/ML
INJECTION INTRAMUSCULAR; INTRAVENOUS PRN
Status: DISCONTINUED | OUTPATIENT
Start: 2021-07-21 | End: 2021-07-21

## 2021-07-21 RX ORDER — SODIUM CHLORIDE, SODIUM LACTATE, POTASSIUM CHLORIDE, CALCIUM CHLORIDE 600; 310; 30; 20 MG/100ML; MG/100ML; MG/100ML; MG/100ML
INJECTION, SOLUTION INTRAVENOUS CONTINUOUS
Status: DISCONTINUED | OUTPATIENT
Start: 2021-07-21 | End: 2021-07-21 | Stop reason: HOSPADM

## 2021-07-21 RX ORDER — LIDOCAINE 40 MG/G
CREAM TOPICAL
Status: DISCONTINUED | OUTPATIENT
Start: 2021-07-21 | End: 2021-07-21 | Stop reason: HOSPADM

## 2021-07-21 RX ORDER — DEXAMETHASONE SODIUM PHOSPHATE 10 MG/ML
INJECTION, SOLUTION INTRAMUSCULAR; INTRAVENOUS PRN
Status: DISCONTINUED | OUTPATIENT
Start: 2021-07-21 | End: 2021-07-21

## 2021-07-21 RX ORDER — HEPARIN SODIUM (PORCINE) LOCK FLUSH IV SOLN 100 UNIT/ML 100 UNIT/ML
5 SOLUTION INTRAVENOUS EVERY 8 HOURS
Status: COMPLETED | OUTPATIENT
Start: 2021-07-21 | End: 2021-07-21

## 2021-07-21 RX ORDER — ONDANSETRON 4 MG/1
4 TABLET, ORALLY DISINTEGRATING ORAL EVERY 8 HOURS PRN
Status: ON HOLD | COMMUNITY
End: 2022-02-16

## 2021-07-21 RX ORDER — MORPHINE SULFATE 2 MG/ML
2-4 INJECTION, SOLUTION INTRAMUSCULAR; INTRAVENOUS
Status: DISCONTINUED | OUTPATIENT
Start: 2021-07-21 | End: 2021-07-21 | Stop reason: HOSPADM

## 2021-07-21 RX ORDER — HALOPERIDOL 5 MG/ML
1 INJECTION INTRAMUSCULAR
Status: DISCONTINUED | OUTPATIENT
Start: 2021-07-21 | End: 2021-07-21 | Stop reason: HOSPADM

## 2021-07-21 RX ORDER — CLINDAMYCIN PHOSPHATE 900 MG/50ML
900 INJECTION, SOLUTION INTRAVENOUS SEE ADMIN INSTRUCTIONS
Status: DISCONTINUED | OUTPATIENT
Start: 2021-07-21 | End: 2021-07-21 | Stop reason: HOSPADM

## 2021-07-21 RX ORDER — ONDANSETRON 2 MG/ML
4 INJECTION INTRAMUSCULAR; INTRAVENOUS EVERY 30 MIN PRN
Status: DISCONTINUED | OUTPATIENT
Start: 2021-07-21 | End: 2021-07-21 | Stop reason: HOSPADM

## 2021-07-21 RX ORDER — CETIRIZINE HYDROCHLORIDE 10 MG/1
10 TABLET ORAL DAILY
Status: ON HOLD | COMMUNITY
End: 2022-02-16

## 2021-07-21 RX ORDER — DIPHENHYDRAMINE HYDROCHLORIDE 50 MG/ML
50 INJECTION INTRAMUSCULAR; INTRAVENOUS ONCE
Status: COMPLETED | OUTPATIENT
Start: 2021-07-21 | End: 2021-07-21

## 2021-07-21 RX ORDER — PROPOFOL 10 MG/ML
INJECTION, EMULSION INTRAVENOUS PRN
Status: DISCONTINUED | OUTPATIENT
Start: 2021-07-21 | End: 2021-07-21

## 2021-07-21 RX ORDER — PROPOFOL 10 MG/ML
INJECTION, EMULSION INTRAVENOUS CONTINUOUS PRN
Status: DISCONTINUED | OUTPATIENT
Start: 2021-07-21 | End: 2021-07-21

## 2021-07-21 RX ORDER — GLYCOPYRROLATE 0.2 MG/ML
INJECTION, SOLUTION INTRAMUSCULAR; INTRAVENOUS PRN
Status: DISCONTINUED | OUTPATIENT
Start: 2021-07-21 | End: 2021-07-21

## 2021-07-21 RX ORDER — FENTANYL CITRATE 50 UG/ML
50 INJECTION, SOLUTION INTRAMUSCULAR; INTRAVENOUS EVERY 5 MIN PRN
Status: DISCONTINUED | OUTPATIENT
Start: 2021-07-21 | End: 2021-07-21 | Stop reason: HOSPADM

## 2021-07-21 RX ORDER — SODIUM CHLORIDE 9 MG/ML
INJECTION, SOLUTION INTRAVENOUS ONCE
Status: COMPLETED | OUTPATIENT
Start: 2021-07-21 | End: 2021-07-21

## 2021-07-21 RX ORDER — DIPHENHYDRAMINE HYDROCHLORIDE 50 MG/ML
25 INJECTION INTRAMUSCULAR; INTRAVENOUS EVERY 6 HOURS PRN
Status: DISCONTINUED | OUTPATIENT
Start: 2021-07-21 | End: 2021-07-21 | Stop reason: HOSPADM

## 2021-07-21 RX ORDER — DIPHENHYDRAMINE HYDROCHLORIDE 50 MG/ML
25 INJECTION INTRAMUSCULAR; INTRAVENOUS ONCE
Status: COMPLETED | OUTPATIENT
Start: 2021-07-21 | End: 2021-07-21

## 2021-07-21 RX ORDER — SODIUM CHLORIDE 9 MG/ML
100 INJECTION, SOLUTION INTRAVENOUS ONCE
Status: DISCONTINUED | OUTPATIENT
Start: 2021-07-21 | End: 2021-07-21

## 2021-07-21 RX ORDER — CLINDAMYCIN PHOSPHATE 900 MG/50ML
900 INJECTION, SOLUTION INTRAVENOUS
Status: COMPLETED | OUTPATIENT
Start: 2021-07-21 | End: 2021-07-21

## 2021-07-21 RX ORDER — KETOROLAC TROMETHAMINE 30 MG/ML
30 INJECTION, SOLUTION INTRAMUSCULAR; INTRAVENOUS ONCE
Status: COMPLETED | OUTPATIENT
Start: 2021-07-21 | End: 2021-07-21

## 2021-07-21 RX ORDER — HEPARIN SODIUM (PORCINE) LOCK FLUSH IV SOLN 100 UNIT/ML 100 UNIT/ML
SOLUTION INTRAVENOUS
Status: COMPLETED
Start: 2021-07-21 | End: 2021-07-21

## 2021-07-21 RX ORDER — DIPHENHYDRAMINE HYDROCHLORIDE 50 MG/ML
25 INJECTION INTRAMUSCULAR; INTRAVENOUS EVERY 6 HOURS PRN
Status: DISCONTINUED | OUTPATIENT
Start: 2021-07-21 | End: 2021-07-21 | Stop reason: CLARIF

## 2021-07-21 RX ORDER — FENTANYL CITRATE 50 UG/ML
100 INJECTION, SOLUTION INTRAMUSCULAR; INTRAVENOUS ONCE
Status: COMPLETED | OUTPATIENT
Start: 2021-07-21 | End: 2021-07-21

## 2021-07-21 RX ORDER — LIDOCAINE HYDROCHLORIDE 20 MG/ML
JELLY TOPICAL ONCE
Status: COMPLETED | OUTPATIENT
Start: 2021-07-21 | End: 2021-07-21

## 2021-07-21 RX ORDER — ONDANSETRON 4 MG/1
4 TABLET, ORALLY DISINTEGRATING ORAL EVERY 30 MIN PRN
Status: DISCONTINUED | OUTPATIENT
Start: 2021-07-21 | End: 2021-07-21 | Stop reason: HOSPADM

## 2021-07-21 RX ADMIN — DEXAMETHASONE SODIUM PHOSPHATE 10 MG: 10 INJECTION, SOLUTION INTRAMUSCULAR; INTRAVENOUS at 15:38

## 2021-07-21 RX ADMIN — ONDANSETRON 4 MG: 2 INJECTION INTRAMUSCULAR; INTRAVENOUS at 15:38

## 2021-07-21 RX ADMIN — DIPHENHYDRAMINE HYDROCHLORIDE 25 MG: 50 INJECTION, SOLUTION INTRAMUSCULAR; INTRAVENOUS at 17:11

## 2021-07-21 RX ADMIN — GLYCOPYRROLATE 0.2 MG: 0.2 INJECTION, SOLUTION INTRAMUSCULAR; INTRAVENOUS at 15:38

## 2021-07-21 RX ADMIN — DIPHENHYDRAMINE HYDROCHLORIDE 50 MG: 50 INJECTION INTRAMUSCULAR; INTRAVENOUS at 03:20

## 2021-07-21 RX ADMIN — HEPARIN SODIUM (PORCINE) LOCK FLUSH IV SOLN 100 UNIT/ML 5 ML: 100 SOLUTION at 18:43

## 2021-07-21 RX ADMIN — FENTANYL CITRATE 100 MCG: 50 INJECTION, SOLUTION INTRAMUSCULAR; INTRAVENOUS at 15:34

## 2021-07-21 RX ADMIN — MIDAZOLAM HYDROCHLORIDE 2 MG: 1 INJECTION, SOLUTION INTRAMUSCULAR; INTRAVENOUS at 15:24

## 2021-07-21 RX ADMIN — ROCURONIUM BROMIDE 30 MG: 10 INJECTION, SOLUTION INTRAVENOUS at 15:34

## 2021-07-21 RX ADMIN — HYDROMORPHONE HYDROCHLORIDE 0.5 MG: 1 INJECTION, SOLUTION INTRAMUSCULAR; INTRAVENOUS; SUBCUTANEOUS at 16:03

## 2021-07-21 RX ADMIN — DIPHENHYDRAMINE HYDROCHLORIDE 25 MG: 50 INJECTION INTRAMUSCULAR; INTRAVENOUS at 10:11

## 2021-07-21 RX ADMIN — HYDROMORPHONE HYDROCHLORIDE 0.5 MG: 1 INJECTION, SOLUTION INTRAMUSCULAR; INTRAVENOUS; SUBCUTANEOUS at 08:29

## 2021-07-21 RX ADMIN — LIDOCAINE HYDROCHLORIDE: 20 JELLY TOPICAL at 00:30

## 2021-07-21 RX ADMIN — LIDOCAINE HYDROCHLORIDE 60 MG: 20 INJECTION, SOLUTION INFILTRATION; PERINEURAL at 15:34

## 2021-07-21 RX ADMIN — FENTANYL CITRATE 100 MCG: 50 INJECTION, SOLUTION INTRAMUSCULAR; INTRAVENOUS at 02:36

## 2021-07-21 RX ADMIN — SODIUM CHLORIDE, POTASSIUM CHLORIDE, SODIUM LACTATE AND CALCIUM CHLORIDE: 600; 310; 30; 20 INJECTION, SOLUTION INTRAVENOUS at 14:33

## 2021-07-21 RX ADMIN — SODIUM CHLORIDE: 9 INJECTION, SOLUTION INTRAVENOUS at 04:24

## 2021-07-21 RX ADMIN — MORPHINE SULFATE 4 MG: 4 INJECTION INTRAVENOUS at 06:12

## 2021-07-21 RX ADMIN — PROPOFOL 200 MG: 10 INJECTION, EMULSION INTRAVENOUS at 15:34

## 2021-07-21 RX ADMIN — PROPOFOL 150 MCG/KG/MIN: 10 INJECTION, EMULSION INTRAVENOUS at 15:34

## 2021-07-21 RX ADMIN — CLINDAMYCIN PHOSPHATE 900 MG: 900 INJECTION, SOLUTION INTRAVENOUS at 14:29

## 2021-07-21 RX ADMIN — SUGAMMADEX 260 MG: 100 INJECTION, SOLUTION INTRAVENOUS at 15:54

## 2021-07-21 RX ADMIN — MORPHINE SULFATE 4 MG: 2 INJECTION, SOLUTION INTRAMUSCULAR; INTRAVENOUS at 11:34

## 2021-07-21 RX ADMIN — MORPHINE SULFATE 4 MG: 4 INJECTION INTRAVENOUS at 04:28

## 2021-07-21 RX ADMIN — HYDROMORPHONE HYDROCHLORIDE 0.5 MG: 1 INJECTION, SOLUTION INTRAMUSCULAR; INTRAVENOUS; SUBCUTANEOUS at 15:57

## 2021-07-21 RX ADMIN — HEPARIN 5 ML: 100 SYRINGE at 18:43

## 2021-07-21 RX ADMIN — FENTANYL CITRATE 50 MCG: 50 INJECTION, SOLUTION INTRAMUSCULAR; INTRAVENOUS at 16:15

## 2021-07-21 RX ADMIN — DIPHENHYDRAMINE HYDROCHLORIDE 25 MG: 50 INJECTION, SOLUTION INTRAMUSCULAR; INTRAVENOUS at 16:42

## 2021-07-21 RX ADMIN — KETOROLAC TROMETHAMINE 30 MG: 30 INJECTION, SOLUTION INTRAMUSCULAR; INTRAVENOUS at 00:52

## 2021-07-21 ASSESSMENT — ACTIVITIES OF DAILY LIVING (ADL): DEPENDENT_IADLS:: CLEANING;COOKING;LAUNDRY;SHOPPING;MEAL PREPARATION;TRANSPORTATION

## 2021-07-21 NOTE — ED NOTES
I brought pt. To the bathroom via wheelchair per her request. Gave pt a pair of hospital socks and Vaseline for her lips per her request.

## 2021-07-21 NOTE — ED PROVIDER NOTES
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 and Vomiting and GI Disturbance     vomiting for days, PN: vomiting for days; HUT Comment: vomiting for days; HUT Reaction: Gastrointestinal; HUT Reaction: Nausea And Vomiting; HUT Reaction Type: Intolerance; HUT Severity: Med; HUT Noted: [id number]  vomiting for days       Blood-Group Specific Substance Other (See Comments)     Patient has an anti-Cw and non-specific antibodies. Blood product orders may be delayed. Draw one red top and two purple top tubes for all type/screen/crossmatch orders.  Patient has anti-Cw, anti-K ([employer]), Warm auto and nonspecific antibodies. Blood products may be delayed. Draw patient 24 hours prior to  transfusion. Draw one red top and two purple top tubes for all type and screen orders.     Oxycodone Swelling     Cephalosporins Rash     Influenza Vaccines Other (See Comments) and Nausea and Vomiting     HUT Reaction: Nausea And Vomiting; HUT Reaction Type: Intolerance; HUT Severity: Low; HUT Noted: [id number]     Lamotrigine Rash     Possibly associated with Lamictal.   HUT Comment: Possibly associated with Lamictal. ; HUT Reaction: Rash; HUT Reaction Type: Allergy; HUT Severity: Low; HUT Noted: [id number]     Latex Rash     HUT Reaction: Rash; HUT Reaction Type: Allergy; HUT Severity: Low; HUT Noted: [id number]       FAMILY HISTORY    Family History   Problem Relation Age of Onset     Diabetes Type 2  Maternal Grandmother      Diabetes Type 2  Paternal Grandmother      Breast Cancer Paternal Grandmother      Cerebrovascular Disease Father 53     Myocardial Infarction No family hx of      Coronary Artery Disease Early Onset No family hx of      Ovarian Cancer No family hx of      Colon Cancer No family hx of      Depression Mother      Anxiety Disorder Mother        SOCIAL HISTORY    Social History     Socioeconomic History     Marital status: Single     Spouse name: Not on file     Number of children: Not on file     Years of education: Not on file     Highest education level: Not on file   Occupational History     Occupation: On disability   Tobacco Use     Smoking status: Never Smoker     Smokeless tobacco: Never Used   Substance and Sexual Activity     Alcohol use: No     Alcohol/week: 0.0 standard drinks     Drug use: No     Sexual activity: Not Currently     Partners: Male     Birth control/protection: I.U.D.     Comment: IUD - Mirena - placed July, 2015   Other Topics Concern     Parent/sibling w/ CABG, MI or angioplasty before 65F 55M? Not Asked   Social History Narrative    Single.    Living in independent living portion of [facility].    On disability.    No regular exercise.      Social Determinants  of Health     Financial Resource Strain:      Difficulty of Paying Living Expenses:    Food Insecurity:      Worried About Running Out of Food in the Last Year:      Ran Out of Food in the Last Year:    Transportation Needs:      Lack of Transportation (Medical):      Lack of Transportation (Non-Medical):    Physical Activity:      Days of Exercise per Week:      Minutes of Exercise per Session:    Stress:      Feeling of Stress :    Social Connections:      Frequency of Communication with Friends and Family:      Frequency of Social Gatherings with Friends and Family:      Attends Latter-day Services:      Active Member of Clubs or Organizations:      Attends Club or Organization Meetings:      Marital Status:    Intimate Partner Violence:      Fear of Current or Ex-Partner:      Emotionally Abused:      Physically Abused:      Sexually Abused:        This document serves as a record of services performed by Dr. Dl Alejandro. It was created on his behalf by Jim Kate, trained medical scribe. The creation of this record is based on the scribes personal observations and the providers statements to him/her.     I Dr. Dl Alejandro, personally performed the services described in this documentation as scribed by the above named individual in my presence, and it is both accurate and complete.      Dl Alejandro MD  07/21/21 0214

## 2021-07-21 NOTE — ED PROVIDER NOTES
Patient signed out at change of shift awaiting OR intervention with general surgery for rectal foreign body.     9:49 AM  Pt still waiting to go to the OR.  Maintenance IVF ordered.    11:45 AM  Nursing has called report to PACU. Awaiting transport to PACU.    1:47 PM  Patient is going to the OR now.    Pt otherwise stable.  It is quite likely the patient will be discharged from the OR tonight and therefore no indication to get the hospitalist involved at this time.     Julita Penny MD  07/21/21 2971       Julita Penny MD  07/21/21 1243

## 2021-07-21 NOTE — CONSULTS
"Care Management Initial Consult    General Information  Assessment completed with: PatientNevin  Type of CM/SW Visit: Initial Assessment    Primary Care Provider verified and updated as needed: Yes   Readmission within the last 30 days: no previous admission in last 30 days      Reason for Consult: discharge planning  Advance Care Planning: Advance Care Planning Reviewed: other (comment) (Guardianship in chart)          Communication Assessment  Patient's communication style: spoken language (English or Bilingual)             Cognitive  Cognitive/Neuro/Behavioral: WDL                      Living Environment:   People in home: facility resident, other (see comments) (Group home staff and residents)     Current living Arrangements[facility]")      Able to return to prior arrangements: yes       Family/Social Support:  Care provided by: self  Provides care for: no one  Marital Status: Single  Facility resident(s)/Staff ( staff)          Description of Support System:           Current Resources:   Patient receiving home care services: No     Community Resources: Group Home  Equipment currently used at home: walker, rolling  Supplies currently used at home: Oxygen Tubing/Supplies (\"CPAP\")    Employment/Financial:  Employment Status: unemployed        Financial Concerns:     Referral to Financial Counselor: No       Lifestyle & Psychosocial Needs:  Social Determinants of Health     Tobacco Use: Low Risk      Smoking Tobacco Use: Never Smoker     Smokeless Tobacco Use: Never Used   Alcohol Use:      Frequency of Alcohol Consumption:      Average Number of Drinks:      Frequency of Binge Drinking:    Financial Resource Strain:      Difficulty of Paying Living Expenses:    Food Insecurity:      Worried About Running Out of Food in the Last Year:      Ran Out of Food in the Last Year:    Transportation Needs:      Lack of Transportation (Medical):      Lack of Transportation (Non-Medical):  " "  Physical Activity:      Days of Exercise per Week:      Minutes of Exercise per Session:    Stress:      Feeling of Stress :    Social Connections:      Frequency of Communication with Friends and Family:      Frequency of Social Gatherings with Friends and Family:      Attends Jainism Services:      Active Member of Clubs or Organizations:      Attends Club or Organization Meetings:      Marital Status:    Intimate Partner Violence:      Fear of Current or Ex-Partner:      Emotionally Abused:      Physically Abused:      Sexually Abused:    Depression:      PHQ-2 Score:    Housing Stability:      Unable to Pay for Housing in the Last Year:      Number of Places Lived in the Last Year:      Unstable Housing in the Last Year:        Functional Status:  Prior to admission patient needed assistance:   Dependent ADLs:: Ambulation-walker  Dependent IADLs:: Cleaning, Cooking, Laundry, Shopping, Meal Preparation, Transportation ([employer] or insurance rides)  Assesssment of Functional Status: At functional baseline    Mental Health Status:  Mental Health Status: Current Concern  Mental Health Management: Medication    Chemical Dependency Status:                Values/Beliefs:  Spiritual, Cultural Beliefs, Jainism Practices, Values that affect care:                 Additional Information:  Nevin lives in [facility]. I asked her for the number to the [facility] and she states, \"I don't know what it is\". I was unable to locate it in old charts or online either. She moved to the [facility] in June 2021.    Legal Guardianship: [employer] cell [phone]; work [phone]..    [facility] transport at discharge.    Nevin is currently still in ED and the plan is for her to go to surgery. No admission order is placed yet. Because the plan is that she will go to surgery and then maybe discharge from there back to her group home.    [facility] Fax [phone].    Rhina Grimes, " RN

## 2021-07-21 NOTE — ANESTHESIA CARE TRANSFER NOTE
Patient: Nevin Alvarado    Procedure(s):  REMOVAL, FOREIGN BODY, RECTUM POSSIBLE SIGMOIDOSCOPY  SIGMOIDOSCOPY, FLEXIBLE  LAPAROTOMY    Diagnosis: Foreign body anus/rectum, initial encounter [T18.5XXA]  Diagnosis Additional Information: No value filed.    Anesthesia Type:   MAC     Note:    Oropharynx: oropharynx clear of all foreign objects  Level of Consciousness: awake  Oxygen Supplementation: nasal cannula  Level of Supplemental Oxygen (L/min / FiO2): 4  Independent Airway: airway patency satisfactory and stable  Dentition: dentition unchanged  Vital Signs Stable: post-procedure vital signs reviewed and stable  Report to RN Given: handoff report given  Patient transferred to: PACU  Comments: Patient is wide awake and alert. She continues to complain of abdominal and rectal pain despite narcotics given. Overall she appears comfortable. VSS.   Handoff Report: Identifed the Patient, Identified the Reponsible Provider, Reviewed the pertinent medical history, Discussed the surgical course, Reviewed Intra-OP anesthesia mangement and issues during anesthesia, Set expectations for post-procedure period and Allowed opportunity for questions and acknowledgement of understanding      Vitals:  Vitals Value Taken Time   /73 07/21/21 1601   Temp 37  C (98.6  F) 07/21/21 1601   Pulse 98 07/21/21 1601   Resp 16 07/21/21 1601   SpO2 100 % 07/21/21 1601       Electronically Signed By: HU Carmona CRNA  July 21, 2021  4:05 PM

## 2021-07-21 NOTE — INTERVAL H&P NOTE
I have reviewed the surgical (or preoperative) H&P that is linked to this encounter, and examined the patient. There are no significant changes. Will proceed to OR for flexible sigmoidoscopy, removal of foreign body, possible laparotomy.    Aleksandra Gerber MD  General Surgery  [facility]  [phone]

## 2021-07-21 NOTE — H&P
General Surgery   Nevin Alvarado MRN# [id number]   Age/Sex: 30 year old female YOB: 1990     Reason for consult: 1. Foreign body anus/rectum, initial encounter            Requesting physician: ER- Dr Alejandro                   Assessment and Plan:   Assessment:  Foreign body/plastic spray bottle and rectum    Plan:  Plan for surgical sedation and sigmoidoscopy to remove foreign body.  Hopefully will be able to remove this without any difficulty.            Chief Complaint:     Chief Complaint   Patient presents with     Foreign Body in Rectum        History is obtained from the patient    HPI:   Nevin Alvarado is a 30 year old female who presents with lower abdominal pain as well as lower back pain.  Patient admittedly placed a long plastic eyeglass  bottle into her rectum.  She denies putting it anywhere else including her vagina.  She has a history of swallowing foreign bodies and inserting foreign bodies into the rectum.  Current situation is patient was using the plastic eyeglass  to pleasure herself and was pushing the bottle into her rectum which then it got stuck.  She currently states that she is in pain and that morphine was not helping.  She reports itching with fentanyl and received Benadryl x1.  She is requesting Benadryl again.  She later received Dilaudid which caused her to feel itchy again.  She requests morphine.  Patient also continues to request Benadryl.  A attempt to retrieve the bottle from the rectum was made in the ER without success.  There was no vaginal exam.  She is afebrile.  X-ray of the pelvis you can see clear bottle with the metal top which appears to be in the rectal vault.          Past Medical History:     Past Medical History:   Diagnosis Date     ADD (attention deficit disorder)      ADHD      Anorexia nervosa with bulimia     history of; on Topamax     Anxiety      Anxiety      Asthma      Borderline personality disorder       Borderline personality disorder (H)      Depression      Depression      Eating disorder      H/O self-harm      h/o Suicide attempt 02/21/2018     History of pulmonary embolism 12/2019    Provoked. Completed 3 month course of Apixaban     Morbid obesity      Neuropathy      Obesity      PTSD (post-traumatic stress disorder)      PTSD (post-traumatic stress disorder)      Pulmonary emboli (H)      Rectal foreign body - Recurrent issue, self placed      Self-injurious behavior     hx swallowing nonfood items such as mickie pins     Suicidal overdose (H)      Swallowed foreign body - Recurrent issue, self placed               Past Surgical History:     Past Surgical History:   Procedure Laterality Date     ABDOMEN SURGERY       ABDOMEN SURGERY N/A     Patient stated she had to have glass bottle extracted from her rectum through her abdomen     COMBINED ESOPHAGOSCOPY, GASTROSCOPY, DUODENOSCOPY (EGD), REPLACE ESOPHAGEAL STENT N/A 10/9/2019    Procedure: Upper Endoscopy with Suture Placement;  Surgeon: uZrdo Ramirez MD;  [facility]     ESOPHAGOSCOPY, GASTROSCOPY, DUODENOSCOPY (EGD), COMBINED N/A 3/9/2017    Procedure: COMBINED ESOPHAGOSCOPY, GASTROSCOPY, DUODENOSCOPY (EGD), REMOVE FOREIGN BODY;  Surgeon: Avis Guzmán MD;  [facility]     ESOPHAGOSCOPY, GASTROSCOPY, DUODENOSCOPY (EGD), COMBINED N/A 4/20/2017    Procedure: COMBINED ESOPHAGOSCOPY, GASTROSCOPY, DUODENOSCOPY (EGD), REMOVE FOREIGN BODY;  EGD removal Foregin body;  Surgeon: Lokesh Paula MD;  [facility]     ESOPHAGOSCOPY, GASTROSCOPY, DUODENOSCOPY (EGD), COMBINED N/A 6/12/2017    Procedure: COMBINED ESOPHAGOSCOPY, GASTROSCOPY, DUODENOSCOPY (EGD);  COMBINED ESOPHAGOSCOPY, GASTROSCOPY, DUODENOSCOPY (EGD) [[id number]]attempted removal of foreign body;  Surgeon: Pamela Perez MD;  [facility]     ESOPHAGOSCOPY, GASTROSCOPY, DUODENOSCOPY (EGD), COMBINED N/A 6/9/2017    Procedure: COMBINED ESOPHAGOSCOPY,  GASTROSCOPY, DUODENOSCOPY (EGD), REMOVE FOREIGN BODY;  Esophagoscopy, Gastroscopy, Duodenoscopy, Removal of Foreign Body;  Surgeon: Dejon Marsh MD;  [facility]     ESOPHAGOSCOPY, GASTROSCOPY, DUODENOSCOPY (EGD), COMBINED N/A 1/6/2018    Procedure: COMBINED ESOPHAGOSCOPY, GASTROSCOPY, DUODENOSCOPY (EGD), REMOVE FOREIGN BODY;  COMBINED ESOPHAGOSCOPY, GASTROSCOPY, DUODENOSCOPY (EGD) [by pascal net and snare with profol sedation;  Surgeon: Feliciano Emmanuel MD;  Location:  GI     ESOPHAGOSCOPY, GASTROSCOPY, DUODENOSCOPY (EGD), COMBINED N/A 3/19/2018    Procedure: COMBINED ESOPHAGOSCOPY, GASTROSCOPY, DUODENOSCOPY (EGD), REMOVE FOREIGN BODY;   Esophagodscopy, Gastroscopy, Duodenoscopy,Foreign Body Removal;  Surgeon: Brice Guzmán MD;  [facility]     ESOPHAGOSCOPY, GASTROSCOPY, DUODENOSCOPY (EGD), COMBINED N/A 4/16/2018    Procedure: COMBINED ESOPHAGOSCOPY, GASTROSCOPY, DUODENOSCOPY (EGD), REMOVE FOREIGN BODY;  Esophagogastroduodenoscopy  Foreign Body Removal X 2;  Surgeon: Royer Moise MD;  [facility]     ESOPHAGOSCOPY, GASTROSCOPY, DUODENOSCOPY (EGD), COMBINED N/A 6/1/2018    Procedure: COMBINED ESOPHAGOSCOPY, GASTROSCOPY, DUODENOSCOPY (EGD), REMOVE FOREIGN BODY;  COMBINED ESOPHAGOSCOPY, GASTROSCOPY, DUODENOSCOPY with removal of foreign body, propofol sedation by anesthesia;  Surgeon: Brice Martinez MD;  Location:  GI     ESOPHAGOSCOPY, GASTROSCOPY, DUODENOSCOPY (EGD), COMBINED N/A 7/25/2018    Procedure: COMBINED ESOPHAGOSCOPY, GASTROSCOPY, DUODENOSCOPY (EGD), REMOVE FOREIGN BODY;;  Surgeon: Candy Castelan MD;  Location:  GI     ESOPHAGOSCOPY, GASTROSCOPY, DUODENOSCOPY (EGD), COMBINED N/A 7/28/2018    Procedure: COMBINED ESOPHAGOSCOPY, GASTROSCOPY, DUODENOSCOPY (EGD), REMOVE FOREIGN BODY;  COMBINED ESOPHAGOSCOPY, GASTROSCOPY, DUODENOSCOPY (EGD), REMOVE FOREIGN BODY;  Surgeon: Brice Guzmán MD;  [facility]     ESOPHAGOSCOPY, GASTROSCOPY,  DUODENOSCOPY (EGD), COMBINED N/A 7/31/2018    Procedure: COMBINED ESOPHAGOSCOPY, GASTROSCOPY, DUODENOSCOPY (EGD);  COMBINED ESOPHAGOSCOPY, GASTROSCOPY, DUODENOSCOPY (EGD) TO REMOVE FOREIGN BODY;  Surgeon: Lokesh Paula MD;  [facility]     ESOPHAGOSCOPY, GASTROSCOPY, DUODENOSCOPY (EGD), COMBINED N/A 8/4/2018    Procedure: COMBINED ESOPHAGOSCOPY, GASTROSCOPY, DUODENOSCOPY (EGD), REMOVE FOREIGN BODY;   combined esophagoscopy, gastroscopy, duodenoscopy, REMOVE FOREIGN BODY ;  Surgeon: Lokesh Paula MD;  [facility]     ESOPHAGOSCOPY, GASTROSCOPY, DUODENOSCOPY (EGD), COMBINED N/A 10/6/2019    Procedure: ESOPHAGOGASTRODUODENOSCOPY (EGD) with fireign body removal x2, esophageal stent placement with floroscopy;  Surgeon: Timoteo Espana MD;  [facility]     ESOPHAGOSCOPY, GASTROSCOPY, DUODENOSCOPY (EGD), COMBINED N/A 12/2/2019    Procedure: Esophagogastroduodenoscopy with esophageal stent removal, esophogram;  Surgeon: Kailee Tena MD;  [facility]     ESOPHAGOSCOPY, GASTROSCOPY, DUODENOSCOPY (EGD), COMBINED N/A 12/17/2019    Procedure: ESOPHAGOGASTRODUODENOSCOPY, WITH FOREIGN BODY REMOVAL;  Surgeon: Pamela Perez MD;  [facility]     ESOPHAGOSCOPY, GASTROSCOPY, DUODENOSCOPY (EGD), COMBINED N/A 12/13/2019    Procedure: ESOPHAGOGASTRODUODENOSCOPY, WITH FOREIGN BODY REMOVAL;  Surgeon: Samia Stanton MD;  [facility]     ESOPHAGOSCOPY, GASTROSCOPY, DUODENOSCOPY (EGD), COMBINED N/A 12/28/2019    Procedure: ESOPHAGOGASTRODUODENOSCOPY (EGD) Removal of Foreign Body X 2;  Surgeon: Hyu Kelley MD;  [facility]     ESOPHAGOSCOPY, GASTROSCOPY, DUODENOSCOPY (EGD), COMBINED N/A 1/5/2020    Procedure: ESOPHAGOGASTRODUOENOSCOPY WITH FOREIGN BODY REMOVAL;  Surgeon: Pamela Perez MD;  [facility]     ESOPHAGOSCOPY, GASTROSCOPY, DUODENOSCOPY (EGD), COMBINED N/A 1/3/2020    Procedure: ESOPHAGOGASTRODUODENOSCOPY (EGD) REMOVAL OF FOREIGN  BODY.;  Surgeon: Pamela Perez MD;  [facility]     ESOPHAGOSCOPY, GASTROSCOPY, DUODENOSCOPY (EGD), COMBINED N/A 1/13/2020    Procedure: ESOPHAGOGASTRODUODENOSCOPY (EGD) for foreign body removal;  Surgeon: Lokesh Paula MD;  [facility]     ESOPHAGOSCOPY, GASTROSCOPY, DUODENOSCOPY (EGD), COMBINED N/A 1/18/2020    Procedure: Diagnostic ESOPHAGOGASTRODUODENOSCOPY (EGD;  Surgeon: Lokesh Paula MD;  [facility]     ESOPHAGOSCOPY, GASTROSCOPY, DUODENOSCOPY (EGD), COMBINED N/A 3/29/2020    Procedure: UPPER ENDOSCOPY WITH FOREIGN BODY REMOVAL;  Surgeon: Doug Hansen MD;  [facility]     ESOPHAGOSCOPY, GASTROSCOPY, DUODENOSCOPY (EGD), COMBINED N/A 7/11/2020    Procedure: ESOPHAGOGASTRODUODENOSCOPY (EGD); Upper Endoscopy WITH FOREIGN BODY REMOVAL;  Surgeon: Lyndsey Mendoza DO;  [facility]     ESOPHAGOSCOPY, GASTROSCOPY, DUODENOSCOPY (EGD), COMBINED N/A 7/29/2020    Procedure: ESOPHAGOGASTRODUODENOSCOPY REMOVAL OF FOREIGN BODY;  Surgeon: Samia Stanton MD;  [facility]     ESOPHAGOSCOPY, GASTROSCOPY, DUODENOSCOPY (EGD), COMBINED N/A 8/1/2020    Procedure: ESOPHAGOGASTRODUODENOSCOPY, WITH FOREIGN BODY REMOVAL;  Surgeon: Pamela Perez MD;  [facility]     ESOPHAGOSCOPY, GASTROSCOPY, DUODENOSCOPY (EGD), COMBINED N/A 8/18/2020    Procedure: ESOPHAGOGASTRODUODENOSCOPY (EGD) for foreign body removal;  Surgeon: Pamela Perez MD;  [facility]     ESOPHAGOSCOPY, GASTROSCOPY, DUODENOSCOPY (EGD), COMBINED N/A 8/27/2020    Procedure: ESOPHAGOGASTRODUODENOSCOPY (EGD) with foreign body removal;  Surgeon: Campbell Rogers MD;  [facility]     ESOPHAGOSCOPY, GASTROSCOPY, DUODENOSCOPY (EGD), COMBINED N/A 9/18/2020    Procedure: ESOPHAGOGASTRODUODENOSCOPY (EGD) with foreign body removal;  Surgeon: Dick Gillis MD;  [facility]     ESOPHAGOSCOPY, GASTROSCOPY, DUODENOSCOPY (EGD), COMBINED N/A 11/18/2020    Procedure:  ESOPHAGOGASTRODUODENOSCOPY, WITH FOREIGN BODY REMOVAL;  Surgeon: Felipe Ulloa DO;  [facility]     ESOPHAGOSCOPY, GASTROSCOPY, DUODENOSCOPY (EGD), COMBINED N/A 11/28/2020    Procedure: ESOPHAGOGASTRODUODENOSCOPY (EGD);  Surgeon: Campbell Rogers MD;  Location:  OR     ESOPHAGOSCOPY, GASTROSCOPY, DUODENOSCOPY (EGD), COMBINED N/A 3/12/2021    Procedure: ESOPHAGOGASTRODUODENOSCOPY, WITH FOREIGN BODY REMOVAL using cold snare;  Surgeon: Marianna Rudolph MD;  Location: [facility]     ESOPHAGOSCOPY, GASTROSCOPY, DUODENOSCOPY (EGD), COMBINED N/A 12/10/2017    Procedure: ESOPHAGOGASTRODUODENOSCOPY (EGD) with foreign body removal;  Surgeon: Lila Sol MD;  Location: [facility];  Service:      ESOPHAGOSCOPY, GASTROSCOPY, DUODENOSCOPY (EGD), COMBINED N/A 2/13/2018    Procedure: ESOPHAGOGASTRODUODENOSCOPY (EGD);  Surgeon: Barney Pinto MD;  Location: [facility];  Service:      ESOPHAGOSCOPY, GASTROSCOPY, DUODENOSCOPY (EGD), COMBINED N/A 11/9/2018    Procedure: UPPER ENDOSCOPY, FOREIGN BODY REMOVAL;  Surgeon: Cristino Kelsey MD;  Location: [facility] OR;  Service: Gastroenterology     ESOPHAGOSCOPY, GASTROSCOPY, DUODENOSCOPY (EGD), COMBINED N/A 11/17/2018    Procedure: ESOPHAGOGASTRODUODENOSCOPY (EGD) with foreign body removal;  Surgeon: Gustavo Mathew MD;  Location: [facility];  Service: Gastroenterology     ESOPHAGOSCOPY, GASTROSCOPY, DUODENOSCOPY (EGD), COMBINED N/A 11/22/2018    Procedure: ESOPHAGOGASTRODUODENOSCOPY (EGD);  Surgeon: Binu Vigil MD;  Location: [facility] OR;  Service: Gastroenterology     ESOPHAGOSCOPY, GASTROSCOPY, DUODENOSCOPY (EGD), COMBINED N/A 11/25/2018    Procedure: UPPER ENDOSCOPY TO REMOVE PAPER CLIPS;  Surgeon: Hira Jacobs MD;  Location: [facility] OR;  Service: Gastroenterology     EXAM UNDER ANESTHESIA ANUS N/A 1/10/2017    Procedure: EXAM UNDER ANESTHESIA ANUS;  Surgeon: Annmarie Haynes MD;  Location:  OR      EXAM UNDER ANESTHESIA RECTUM N/A 7/19/2018    Procedure: EXAM UNDER ANESTHESIA RECTUM;  EXAM UNDER ANESTHESIA, REMOVAL OF RECTAL FOREIGN BODY;  Surgeon: Annmarie Haynes MD;  [facility]     HC REMOVE FECAL IMPACTION OR FB W ANESTHESIA N/A 12/18/2016    Procedure: REMOVE FECAL IMPACTION/FOREIGN BODY UNDER ANESTHESIA;  Surgeon: Soham Cano MD;  [facility]     KNEE SURGERY Right      KNEE SURGERY - removed a small tissue mass from knee Right      LAPAROSCOPIC ABLATION ENDOMETRIOSIS       LAPAROTOMY EXPLORATORY N/A 1/10/2017    Procedure: LAPAROTOMY EXPLORATORY;  Surgeon: Annmarie Haynes MD;  [facility]     LAPAROTOMY EXPLORATORY  09/11/2019    Manual manipulation of bowel to remove pill bottle in rectum     lymph nodes removed from neck; benign  age 7     MAMMOPLASTY REDUCTION Bilateral      OTHER SURGICAL HISTORY      foreign body anus removal     DE ESOPHAGOGASTRODUODENOSCOPY TRANSORAL DIAGNOSTIC N/A 1/5/2019    Procedure: ESOPHAGOGASTRODUODENOSCOPY (EGD) with foreign body removal using raptor;  Surgeon: Lila Sol MD;  Location: [facility];  Service: Gastroenterology     DE ESOPHAGOGASTRODUODENOSCOPY TRANSORAL DIAGNOSTIC N/A 1/25/2019    Procedure: ESOPHAGOGASTRODUODENOSCOPY (EGD) removal of foreign body;  Surgeon: Binu Vigil MD;  Location: [facility];  Service: Gastroenterology     DE ESOPHAGOGASTRODUODENOSCOPY TRANSORAL DIAGNOSTIC N/A 1/31/2019    Procedure: ESOPHAGOGASTRODUODENOSCOPY (EGD);  Surgeon: Siddharth Spears MD;  Location: [facility];  Service: Gastroenterology     DE ESOPHAGOGASTRODUODENOSCOPY TRANSORAL DIAGNOSTIC N/A 8/17/2019    Procedure: ESOPHAGOGASTRODUODENOSCOPY (EGD) with foreign body removal;  Surgeon: Darek Lucero MD;  Location: [facility];  Service: Gastroenterology     DE ESOPHAGOGASTRODUODENOSCOPY TRANSORAL DIAGNOSTIC N/A 9/29/2019    Procedure: ESOPHAGOGASTRODUODENOSCOPY (EGD) with  foreign body removal;  Surgeon: Bailey Arnold MD;  Location: [facility];  Service: Gastroenterology     NM ESOPHAGOGASTRODUODENOSCOPY TRANSORAL DIAGNOSTIC N/A 10/3/2019    Procedure: ESOPHAGOGASTRODUODENOSCOPY (EGD), REMOVAL OF FOREIGN BODY;  Surgeon: Chris Lira MD;  Location: [facility] OR;  Service: Gastroenterology     NM ESOPHAGOGASTRODUODENOSCOPY TRANSORAL DIAGNOSTIC N/A 10/6/2019    Procedure: ESOPHAGOGASTRODUODENOSCOPY (EGD) with attempted foreign body removal;  Surgeon: Felipe Connolly MD;  Location: [facility];  Service: Gastroenterology     NM ESOPHAGOGASTRODUODENOSCOPY TRANSORAL DIAGNOSTIC N/A 12/15/2019    Procedure: ESOPHAGOGASTRODUODENOSCOPY (EGD) with foreign body removal;  Surgeon: Jeffy Zuñiga MD;  Location: [facility];  Service: Gastroenterology     NM ESOPHAGOGASTRODUODENOSCOPY TRANSORAL DIAGNOSTIC N/A 12/17/2019    Procedure: ESOPHAGOGASTRODUODENOSCOPY (EGD) with attempted foreign body removal;  Surgeon: Felipe Connolly MD;  Location: [facility];  Service: Gastroenterology     NM ESOPHAGOGASTRODUODENOSCOPY TRANSORAL DIAGNOSTIC N/A 12/21/2019    Procedure: ESOPHAGOGASTRODUODENOSCOPY (EGD) FOR FROEIGN BODY REMOVAL;  Surgeon: Binu Vigil MD;  Location: [facility];  Service: Gastroenterology     NM ESOPHAGOGASTRODUODENOSCOPY TRANSORAL DIAGNOSTIC N/A 7/22/2020    Procedure: ESOPHAGOGASTRODUODENOSCOPY (EGD);  Surgeon: Bailey Arnold MD;  Location: [facility] OR;  Service: Gastroenterology     NM ESOPHAGOGASTRODUODENOSCOPY TRANSORAL DIAGNOSTIC N/A 8/14/2020    Procedure: ESOPHAGOGASTRODUODENOSCOPY (EGD) FOREIGN BODY REMOVAL;  Surgeon: Jeffy Zuñiga MD;  Location: [facility] OR;  Service: Gastroenterology     NM ESOPHAGOGASTRODUODENOSCOPY TRANSORAL DIAGNOSTIC N/A 2/25/2021    Procedure: ESOPHAGOGASTRODUODENOSCOPY (EGD) with foreign body reoval;  Surgeon: Bird Sethi MD;  Location: [facility];  Service:  Gastroenterology     NV ESOPHAGOGASTRODUODENOSCOPY TRANSORAL DIAGNOSTIC N/A 4/19/2021    Procedure: ESOPHAGOGASTRODUODENOSCOPY (EGD);  Surgeon: Libia Rose MD;  Location: [facility];  Service: Gastroenterology     NV SURG DIAGNOSTIC EXAM, ANORECTAL N/A 2/5/2020    Procedure: EXAM UNDER ANESTHESIA, Flexible Sigmoidoscopy, Retrieval of Foreign Body;  Surgeon: Sasha Ivan MD;  Location: [facility];  Service: General     RELEASE CARPAL TUNNEL Bilateral      RELEASE CARPAL TUNNEL Bilateral      SIGMOIDOSCOPY FLEXIBLE N/A 1/10/2017    Procedure: SIGMOIDOSCOPY FLEXIBLE;  Surgeon: Annmarie Haynes MD;  [facility]     SIGMOIDOSCOPY FLEXIBLE N/A 5/8/2018    Procedure: SIGMOIDOSCOPY FLEXIBLE;  flex sig with foreign body removal using snare and rattooth forcep;  Surgeon: Soham Cano MD;  Location:  GI     SIGMOIDOSCOPY FLEXIBLE N/A 2/20/2019    Procedure: Exam under anesthesia Colonoscopy with attempted  removal of rectal foreign body;  Surgeon: Estrada Chávez MD;  [facility]             Social History:    reports that she has never smoked. She has never used smokeless tobacco. She reports that she does not drink alcohol and does not use drugs.           Family History:     Family History   Problem Relation Age of Onset     Diabetes Type 2  Maternal Grandmother      Diabetes Type 2  Paternal Grandmother      Breast Cancer Paternal Grandmother      Cerebrovascular Disease Father 53     Myocardial Infarction No family hx of      Coronary Artery Disease Early Onset No family hx of      Ovarian Cancer No family hx of      Colon Cancer No family hx of      Depression Mother      Anxiety Disorder Mother               Allergies:     Allergies   Allergen Reactions     Amoxicillin-Pot Clavulanate Other (See Comments), Swelling and Rash     PN: facial swelling, left side. Also had cortisone injection the same day as she started the Augmentin.  Noted in downtime recovery of chart.    PN:  facial swelling, left side. Also had cortisone injection the same day as she started the Augmentin.; HUT Comment: PN: facial swelling, left side. Also had cortisone injection the same day as she started the Augmentin.Noted in downtime recovery of chart.; HUT Reaction: Rash; HUT Reaction: Unknown; HUT Reaction Type: Allergy; HUT Severity: Med; HUT Noted: [id number]     Oseltamivir Hives     med stopped, PN: med stopped  med stopped, PN: med stopped; HUT Comment: med stopped, PN: med stopped; HUT Reaction: Hives; HUT Reaction Type: Allergy; HUT Severity: Med; HUT Noted: [id number]     Penicillins Anaphylaxis     HUT Reaction: Anaphylaxis; HUT Reaction Type: Allergy; HUT Severity: High; HUT Noted: [id number]     Vancomycin Itching, Swelling and Rash     Other reaction(s): Redness  Flushed face, very itchy; HUT Comment: Flushed face, very itchy; HUT Reaction: Angioedema; HUT Reaction: Redness; HUT Severity: Med; HUT Noted: [id number]    facial     Hydrocodone Nausea and Vomiting and GI Disturbance     vomiting for days, PN: vomiting for days; HUT Comment: vomiting for days; HUT Reaction: Gastrointestinal; HUT Reaction: Nausea And Vomiting; HUT Reaction Type: Intolerance; HUT Severity: Med; HUT Noted: [id number]  vomiting for days       Blood-Group Specific Substance Other (See Comments)     Patient has an anti-Cw and non-specific antibodies. Blood product orders may be delayed. Draw one red top and two purple top tubes for all type/screen/crossmatch orders.  Patient has anti-Cw, anti-K ([place]), Warm auto and nonspecific antibodies. Blood products may be delayed. Draw patient 24 hours prior to transfusion. Draw one red top and two purple top tubes for all type and screen orders.     Oxycodone Swelling     Cephalosporins Rash     Influenza Vaccines Other (See Comments) and Nausea and Vomiting     HUT Reaction: Nausea And Vomiting; HUT Reaction Type: Intolerance; HUT Severity: Low; HUT Noted: [id number]     Lamotrigine Rash      Possibly associated with Lamictal.   HUT Comment: Possibly associated with Lamictal. ; HUT Reaction: Rash; HUT Reaction Type: Allergy; HUT Severity: Low; HUT Noted: [id number]     Latex Rash     HUT Reaction: Rash; HUT Reaction Type: Allergy; HUT Severity: Low; HUT Noted: [id number]              Medications:     Prior to Admission medications    Medication Sig Start Date End Date Taking? Authorizing Provider   acetaminophen (TYLENOL) 500 MG tablet Take 500-1,000 mg by mouth every 8 hours as needed for mild pain    Yes Unknown, Entered By History   albuterol (PROAIR HFA/PROVENTIL HFA/VENTOLIN HFA) 108 (90 Base) MCG/ACT inhaler Inhale 2 puffs into the lungs every 4 hours as needed for shortness of breath / dyspnea or wheezing    Yes Reported, Patient   busPIRone (BUSPAR) 15 MG tablet Take 1 tablet (15 mg) by mouth 2 times daily  Patient taking differently: Take 15 mg by mouth 3 times daily  8/19/20  Yes Her, Julieth S   cetirizine (ZYRTEC) 10 MG tablet Take 10 mg by mouth daily   Yes Unknown, Entered By History   Cholecalciferol (VITAMIN D) 50 MCG (2000 UT) CAPS Take 2,000 Units by mouth daily    Yes Unknown, Entered By History   cloNIDine (CATAPRES) 0.1 MG tablet Take 0.1 mg by mouth 2 times daily    Yes Reported, Patient   desvenlafaxine (PRISTIQ) 100 MG 24 hr tablet Take 1 tablet (100 mg) by mouth every morning   Yes Reported, Patient   diclofenac (VOLTAREN) 1 % topical gel Apply 4 g topically 4 times daily  Patient taking differently: Apply 4 g topically 4 times daily as needed  7/4/21  Yes Gustavo Quintero MD   hydroxychloroquine (PLAQUENIL) 200 MG tablet Take 200 mg by mouth 2 times daily   Yes Reported, Patient   lurasidone (LATUDA) 60 MG TABS tablet Take 60 mg by mouth daily (with dinner)  12/4/18  Yes Reported, Patient   metFORMIN (GLUCOPHAGE-XR) 500 MG 24 hr tablet Take 1,000 mg by mouth daily (with dinner)    Yes Unknown, Entered By History   ondansetron (ZOFRAN-ODT) 4 MG ODT tab Take 4 mg by mouth  "every 8 hours as needed for nausea   Yes Unknown, Entered By History   pregabalin (LYRICA) 50 MG capsule Take 50 mg by mouth 3 times daily   Yes Reported, Patient   valACYclovir (VALTREX) 1000 mg tablet Take 2 tablets by mouth two times daily for one day. Use as needed at onset of cold sore.    Yes Unknown, Entered By History              Review of Systems:   The Review of Systems is negative other than noted in the HPI            Physical Exam:     Patient Vitals for the past 24 hrs:   BP Temp Temp src Pulse Resp SpO2 Height Weight   07/21/21 0700 (!) 145/64 -- -- 92 -- 96 % -- --   07/21/21 0630 (!) 153/65 -- -- 94 -- 95 % -- --   07/21/21 0600 (!) 161/71 -- -- 100 -- 96 % -- --   07/21/21 0545 -- -- -- 89 -- 94 % -- --   07/21/21 0530 (!) 149/84 -- -- 95 -- 94 % -- --   07/21/21 0515 -- -- -- 89 -- 95 % -- --   07/21/21 0500 (!) 142/93 -- -- 97 -- 93 % -- --   07/21/21 0430 116/69 -- -- 97 -- 95 % -- --   07/21/21 0400 123/57 -- -- 92 -- 96 % -- --   07/21/21 0330 132/68 -- -- 104 -- 97 % -- --   07/21/21 0300 120/59 -- -- 97 -- 96 % -- --   07/21/21 0130 -- -- -- 103 -- 98 % -- --   07/21/21 0115 -- -- -- 98 -- 97 % -- --   07/20/21 1943 (!) 154/95 98  F (36.7  C) Oral 100 18 96 % 1.575 m (5' 2\") 128.8 kg (284 lb)        No intake or output data in the 24 hours ending 07/21/21 1053   Constitutional:   awake, alert, cooperative, no apparent distress, and appears stated age             Hematologic / Lymphatic:   No inguinal lymphadenopathy.       Lungs:   Normal respiratory effort, no accessory muscle use       Cardiovascular:   Regular rate and rhythm       Abdomen:   Soft tenderness suprapubic without rebound or peritoneal signs present.  Large lower abdominal incision from previous exploratory laparotomy       Musculoskeletal:   No obvious swelling, bruising or deformity       Skin:   Skin color and texture normal for patient, no rashes or lesions              Data:        Admission on 07/21/2021   Component " Date Value     WBC Count 07/21/2021 8.6      RBC Count 07/21/2021 4.02      Hemoglobin 07/21/2021 10.9*     Hematocrit 07/21/2021 34.2*     MCV 07/21/2021 85      MCH 07/21/2021 27.1      MCHC 07/21/2021 31.9      RDW 07/21/2021 15.0      Platelet Count 07/21/2021 238      Sodium 07/21/2021 141      Potassium 07/21/2021 3.7      Chloride 07/21/2021 105      Carbon Dioxide (CO2) 07/21/2021 26      Anion Gap 07/21/2021 10      Urea Nitrogen 07/21/2021 13      Creatinine 07/21/2021 0.72      Calcium 07/21/2021 9.4      Glucose 07/21/2021 100      GFR Estimate 07/21/2021 >90      INR 07/21/2021 1.04      Hold Specimen 07/21/2021 [facility]      Internal QC hCG Serum 07/21/2021 Valid          Abdominal films:   No air fluid levels, calcifications, free air or excessive stool, normal gas pattern       Tatiana Spain PA-C

## 2021-07-21 NOTE — PHARMACY-ADMISSION MEDICATION HISTORY
Pharmacy Note - Admission Medication History    Pertinent Provider Information: pt would like to continue to take her medications at the same time she takes them at the group home     ______________________________________________________________________    Prior To Admission (PTA) med list completed and updated in EMR.       Prior to Admission Medications   Prescriptions Last Dose Informant Patient Reported? Taking?   Cholecalciferol (VITAMIN D) 50 MCG (2000 UT) CAPS 7/20/2021 at 0800 Self Yes Yes   Sig: Take 2,000 Units by mouth daily    acetaminophen (TYLENOL) 500 MG tablet   Yes Yes   Sig: Take 500-1,000 mg by mouth every 8 hours as needed for mild pain    albuterol (PROAIR HFA/PROVENTIL HFA/VENTOLIN HFA) 108 (90 Base) MCG/ACT inhaler   Yes Yes   Sig: Inhale 2 puffs into the lungs every 4 hours as needed for shortness of breath / dyspnea or wheezing    busPIRone (BUSPAR) 15 MG tablet 7/20/2021 at 1900  No Yes   Sig: Take 1 tablet (15 mg) by mouth 2 times daily   Patient taking differently: Take 15 mg by mouth 3 times daily    cetirizine (ZYRTEC) 10 MG tablet 7/20/2021 at 1900  Yes Yes   Sig: Take 10 mg by mouth daily   cloNIDine (CATAPRES) 0.1 MG tablet 7/20/2021 at 1900 Self Yes Yes   Sig: Take 0.1 mg by mouth 2 times daily    desvenlafaxine (PRISTIQ) 100 MG 24 hr tablet 7/20/2021 at 0800 Self Yes Yes   Sig: Take 1 tablet (100 mg) by mouth every morning   diclofenac (VOLTAREN) 1 % topical gel Past Month at Unknown time  No Yes   Sig: Apply 4 g topically 4 times daily   Patient taking differently: Apply 4 g topically 4 times daily as needed    hydroxychloroquine (PLAQUENIL) 200 MG tablet 7/20/2021 at 1900  Yes Yes   Sig: Take 200 mg by mouth 2 times daily   lurasidone (LATUDA) 60 MG TABS tablet 7/20/2021 at 1900 Self Yes Yes   Sig: Take 60 mg by mouth daily (with dinner)    metFORMIN (GLUCOPHAGE-XR) 500 MG 24 hr tablet 7/20/2021 at 1700 Self Yes Yes   Sig: Take 1,000 mg by mouth daily (with dinner)     ondansetron (ZOFRAN-ODT) 4 MG ODT tab   Yes Yes   Sig: Take 4 mg by mouth every 8 hours as needed for nausea   pregabalin (LYRICA) 50 MG capsule 7/20/2021 at 1900  Yes Yes   Sig: Take 50 mg by mouth 3 times daily   valACYclovir (VALTREX) 1000 mg tablet   Yes Yes   Sig: Take 2 tablets by mouth two times daily for one day. Use as needed at onset of cold sore.       Facility-Administered Medications: None       Information [facility](s[facility]  Method of interview communication: in-person    Summary of Changes to PTA Med List  New: Zyrtec, Zofran  Discontinued: Senna  Changed: Buspar    Patient was asked about OTC/herbal products specifically.  PTA med list reflects this.    In the past week, patient estimated taking medication this percent of the time:  greater than 90%.    Allergies were reviewed, assessed, and updated with the patient.      Patient did not bring any medications to the hospital and can't retrieve from home. No multi-dose medications are available for use during hospital stay.     The information provided in this note is only as accurate as the sources available at the time of the update(s).    Thank you for the opportunity to participate in the care of this patient.    Rhea Babcock  7/21/2021 8:18 AM

## 2021-07-21 NOTE — ANESTHESIA PROCEDURE NOTES
Airway       Patient location during procedure: OR       Procedure Start/Stop Times: 7/21/2021 3:37 PM  Staff -        CRNA: Red Roe APRN CRNA       Performed By: CRNAIndications and Patient Condition       Indications for airway management: isma-procedural       Induction type:intravenous       Mask difficulty assessment: 2 - vent by mask + OA or adjuvant +/- NMBA    Final Airway Details       Final airway type: endotracheal airway       Successful airway: Oral and ETT - single  Endotracheal Airway Details        ETT size (mm): 7.0       Cuffed: yes       Successful intubation technique: direct laryngoscopy       DL Blade Type: Fournier 2       Grade View of Cords: 1       Adjucts: stylet and tooth guard       Position: Right       Measured from: gums/teeth       Secured at (cm): 21       Bite block used: Oral Airway    Post intubation assessment        Placement verified by: capnometry, equal breath sounds and chest rise        Number of attempts at approach: 1       Number of other approaches attempted: 0       Secured with: silk tape       Ease of procedure: easy       Dentition: Intact    Additional Comments       Very easy to mask ventilate with a 9cm OAW / grade 1 view of cords / sniffing position / ETT passed with ease into glottic opening

## 2021-07-21 NOTE — OP NOTE
Name:  Nevin Alvarado  PCP:  Latonya Knight  Procedure Date:  7/21/2021      Removal of rectal foreign body    Pre-Procedure Diagnosis:  Foreign body anus/rectum, initial encounter [T18.5XXA]     Post-Procedure Diagnosis:    Foreign body in rectum    Surgeon(s):  Aleksandra Gerber MD      Anesthesia Type:    General    Estimated Blood Loss:   none    Specimens:    Appendix    Complications:    none    Indication for procedure:  30y F with h/o multiple foreign body ingestions presented after placement of a foreign body in her rectum (eyeglass  bottle). She was recommended to undergo removal under anesthesia and she agreed.    Operative Report:    After informed consent was obtained, and the risks and benefits of the procedure were discussed, the patient was brought back to the operative suite and placed in the supine position.  General endotracheal anesthesia was administered and tolerated well. IV antibiotics were not indicated. A Time Out was held. Lubrication was used and and I reached my hand into the rectum and could feel the foreign body. It was easily removed without complication.    Disposition:  Extubated, stable to PACU, discharge from recovery    Aleksandra Gerber MD  General Surgery  [facility]  [phone]         English

## 2021-07-21 NOTE — DISCHARGE INSTRUCTIONS
You may shower normally starting 7/21/21. You may have a small amount of bleeding with your next few bowel movements. No specific activity restrictions.    You may take over the counter tylenol and/or ibuprofen as needed for pain.    You do not need a postop appointment with your surgeon. You may also call the surgery clinic at [phone] 24 hours a day if you have questions about your surgery or any postoperative issues.             Form received at [facility] on 6/17/2019.    Please note that it takes 7-10 business days for completion.     Authorization on file. 3/13/19
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
## 2021-07-22 LAB
PATH REPORT.COMMENTS IMP SPEC: NORMAL
PATH REPORT.COMMENTS IMP SPEC: NORMAL
PATH REPORT.FINAL DX SPEC: NORMAL
PATH REPORT.GROSS SPEC: NORMAL
PATH REPORT.RELEVANT HX SPEC: NORMAL
PHOTO IMAGE: NORMAL

## 2021-07-22 PROCEDURE — 88300 SURGICAL PATH GROSS: CPT | Mod: 26 | Performed by: PATHOLOGY

## 2021-07-22 NOTE — ANESTHESIA POSTPROCEDURE EVALUATION
Patient: Nevin Alvarado    Procedure(s):  MANUAL RETREIVALOF FOREIGN OBJECT- RECTUM.    Diagnosis:Foreign body anus/rectum, initial encounter [T18.5XXA]  Diagnosis Additional Information: No value filed.    Anesthesia Type:  MAC    Note:  Disposition: Outpatient   Postop Pain Control: Uneventful            Sign Out: Well controlled pain   PONV: No   Neuro/Psych: Uneventful            Sign Out: Acceptable/Baseline neuro status   Airway/Respiratory: Uneventful            Sign Out: Acceptable/Baseline resp. status   CV/Hemodynamics: Uneventful            Sign Out: Acceptable CV status; No obvious hypovolemia; No obvious fluid overload   Other NRE: NONE   DID A NON-ROUTINE EVENT OCCUR? No           Last vitals:  Vitals Value Taken Time   /85 07/21/21 1715   Temp 36.3  C (97.3  F) 07/21/21 1650   Pulse 104 07/21/21 1728   Resp 12 07/21/21 1728   SpO2 97 % 07/21/21 1728   Vitals shown include unvalidated device data.    Electronically Signed By: Campbell Gross MD  July 21, 2021  9:51 PM

## 2021-07-22 NOTE — PROGRESS NOTES
Spoke with Dr. Tee about patient's O2 saturation ranging from 87-94%.  Patient does not appear to be in respiratory distress and does not appear to be short of breath.  Her work of breathing is not increased.  Patient will be transported back to group home.

## 2021-07-31 ENCOUNTER — APPOINTMENT (OUTPATIENT)
Dept: RADIOLOGY | Facility: HOSPITAL | Age: 31
End: 2021-07-31
Attending: EMERGENCY MEDICINE
Payer: COMMERCIAL

## 2021-07-31 ENCOUNTER — HOSPITAL ENCOUNTER (OUTPATIENT)
Facility: HOSPITAL | Age: 31
Setting detail: OBSERVATION
Discharge: NURSING, REHAB OR LONG-TERM CARE | End: 2021-08-01
Attending: EMERGENCY MEDICINE | Admitting: INTERNAL MEDICINE
Payer: COMMERCIAL

## 2021-07-31 DIAGNOSIS — T18.9XXA SWALLOWED FOREIGN BODY, INITIAL ENCOUNTER: Primary | ICD-10-CM

## 2021-07-31 DIAGNOSIS — F41.9 ANXIETY DISORDER, UNSPECIFIED TYPE: ICD-10-CM

## 2021-07-31 LAB — UPPER GI ENDOSCOPY: NORMAL

## 2021-07-31 PROCEDURE — 99152 MOD SED SAME PHYS/QHP 5/>YRS: CPT

## 2021-07-31 PROCEDURE — G0378 HOSPITAL OBSERVATION PER HR: HCPCS

## 2021-07-31 PROCEDURE — G0500 MOD SEDAT ENDO SERVICE >5YRS: HCPCS | Performed by: INTERNAL MEDICINE

## 2021-07-31 PROCEDURE — 250N000011 HC RX IP 250 OP 636: Performed by: INTERNAL MEDICINE

## 2021-07-31 PROCEDURE — 258N000003 HC RX IP 258 OP 636: Performed by: EMERGENCY MEDICINE

## 2021-07-31 PROCEDURE — 99220 PR INITIAL OBSERVATION CARE,LEVEL III: CPT | Performed by: EMERGENCY MEDICINE

## 2021-07-31 PROCEDURE — 96374 THER/PROPH/DIAG INJ IV PUSH: CPT | Mod: XU

## 2021-07-31 PROCEDURE — 250N000011 HC RX IP 250 OP 636: Performed by: EMERGENCY MEDICINE

## 2021-07-31 PROCEDURE — 74019 RADEX ABDOMEN 2 VIEWS: CPT

## 2021-07-31 PROCEDURE — 999N000156 HC STATISTIC RCP CONSULT EA 30 MIN

## 2021-07-31 PROCEDURE — 99285 EMERGENCY DEPT VISIT HI MDM: CPT | Mod: 25

## 2021-07-31 PROCEDURE — 43235 EGD DIAGNOSTIC BRUSH WASH: CPT | Performed by: INTERNAL MEDICINE

## 2021-07-31 PROCEDURE — 99153 MOD SED SAME PHYS/QHP EA: CPT

## 2021-07-31 PROCEDURE — 250N000009 HC RX 250: Performed by: INTERNAL MEDICINE

## 2021-07-31 RX ORDER — KETOROLAC TROMETHAMINE 15 MG/ML
15 INJECTION, SOLUTION INTRAMUSCULAR; INTRAVENOUS ONCE
Status: COMPLETED | OUTPATIENT
Start: 2021-07-31 | End: 2021-07-31

## 2021-07-31 RX ORDER — DIPHENHYDRAMINE HYDROCHLORIDE 50 MG/ML
INJECTION INTRAMUSCULAR; INTRAVENOUS PRN
Status: COMPLETED | OUTPATIENT
Start: 2021-07-31 | End: 2021-07-31

## 2021-07-31 RX ORDER — FENTANYL CITRATE 50 UG/ML
INJECTION, SOLUTION INTRAMUSCULAR; INTRAVENOUS PRN
Status: COMPLETED | OUTPATIENT
Start: 2021-07-31 | End: 2021-07-31

## 2021-07-31 RX ORDER — SODIUM CHLORIDE 9 MG/ML
INJECTION, SOLUTION INTRAVENOUS ONCE
Status: COMPLETED | OUTPATIENT
Start: 2021-07-31 | End: 2021-07-31

## 2021-07-31 RX ORDER — ALBUTEROL SULFATE 90 UG/1
2 AEROSOL, METERED RESPIRATORY (INHALATION) EVERY 4 HOURS PRN
Status: DISCONTINUED | OUTPATIENT
Start: 2021-07-31 | End: 2021-08-01 | Stop reason: HOSPADM

## 2021-07-31 RX ORDER — PREDNISONE 20 MG/1
20 TABLET ORAL DAILY
COMMUNITY
Start: 2021-07-31 | End: 2021-08-04

## 2021-07-31 RX ADMIN — DIPHENHYDRAMINE HYDROCHLORIDE 25 MG: 50 INJECTION INTRAMUSCULAR; INTRAVENOUS at 22:21

## 2021-07-31 RX ADMIN — KETOROLAC TROMETHAMINE 15 MG: 15 INJECTION, SOLUTION INTRAMUSCULAR; INTRAVENOUS at 22:51

## 2021-07-31 RX ADMIN — FENTANYL CITRATE 25 MCG: 50 INJECTION, SOLUTION INTRAMUSCULAR; INTRAVENOUS at 22:19

## 2021-07-31 RX ADMIN — MIDAZOLAM HYDROCHLORIDE 1 MG: 1 INJECTION, SOLUTION INTRAMUSCULAR; INTRAVENOUS at 22:14

## 2021-07-31 RX ADMIN — SODIUM CHLORIDE 1000 ML: 9 INJECTION, SOLUTION INTRAVENOUS at 23:29

## 2021-07-31 RX ADMIN — DIPHENHYDRAMINE HYDROCHLORIDE 25 MG: 50 INJECTION INTRAMUSCULAR; INTRAVENOUS at 22:15

## 2021-07-31 RX ADMIN — MIDAZOLAM HYDROCHLORIDE 2 MG: 1 INJECTION, SOLUTION INTRAMUSCULAR; INTRAVENOUS at 22:12

## 2021-07-31 RX ADMIN — MIDAZOLAM HYDROCHLORIDE 2 MG: 1 INJECTION, SOLUTION INTRAMUSCULAR; INTRAVENOUS at 22:24

## 2021-07-31 RX ADMIN — FENTANYL CITRATE 25 MCG: 50 INJECTION, SOLUTION INTRAMUSCULAR; INTRAVENOUS at 22:22

## 2021-07-31 RX ADMIN — BENZOCAINE 2 SPRAY: 220 SPRAY, METERED PERIODONTAL at 22:12

## 2021-07-31 RX ADMIN — MIDAZOLAM HYDROCHLORIDE 1 MG: 1 INJECTION, SOLUTION INTRAMUSCULAR; INTRAVENOUS at 22:19

## 2021-07-31 RX ADMIN — FENTANYL CITRATE 75 MCG: 50 INJECTION, SOLUTION INTRAMUSCULAR; INTRAVENOUS at 22:12

## 2021-07-31 RX ADMIN — FENTANYL CITRATE 25 MCG: 50 INJECTION, SOLUTION INTRAMUSCULAR; INTRAVENOUS at 22:24

## 2021-07-31 RX ADMIN — FENTANYL CITRATE 25 MCG: 50 INJECTION, SOLUTION INTRAMUSCULAR; INTRAVENOUS at 22:14

## 2021-07-31 ASSESSMENT — MIFFLIN-ST. JEOR: SCORE: 1988.24

## 2021-07-31 NOTE — TELEPHONE ENCOUNTER
Can route to RN or care coordinator.    I am okay with the patient being on [place] restricted program. What I've noticed, however, is that patient goes to facilities outside the program regardless (not sure if there's a way to keep patient from doing this since she frequently seeks emergency care).       Critically ill patient with frequent bedside visits. Patient seen and examined on rounds and plan of care discussed with team. In summary, this is a 77 y/o M with PMH of afib on eliquis (dx in his 20s, gets frequent cardioversions, was at hospital for cardioversion the day before admission), prostate ca s/p resection presenting with L flank pain that started the evening before admission, noted to have a obstructing stone and associated hydro. Patient also with sepsis. He underwent L ureteral stent placement/cystoscopy. Post operative with profound hypotension, likely septic shock. Transferred to MICU for vasoplegic shock.     - Will obtain blood cultures.  - Continue dc drip for vasopressor support. Patient with A-line for hemodynamic monitoring.   - Will require MICU level of care given septic shock and profound hypotension.  - Continue monitoring for A fib  - Already received 3 liters of fluid  - Ultrasound with preserved cardiac function, IVC is dilated so would not use more IVF at this time  - Lung ultrasound normal. Critically ill patient with frequent bedside visits. Patient seen and examined on rounds and plan of care discussed with team. In summary, this is a 79 y/o M with PMH of afib on eliquis (dx in his 20s, gets frequent cardioversions, was at hospital for cardioversion the day before admission), prostate ca s/p resection presenting with L flank pain that started the evening before admission, noted to have a obstructing stone and associated hydro. Patient also with sepsis. He underwent L ureteral stent placement/cystoscopy. Post operative with profound hypotension, likely septic shock. Transferred to MICU for vasoplegic shock.     - Will obtain blood cultures.  - Continue dc drip for vasopressor support. Patient with A-line for hemodynamic monitoring.   - Will require MICU level of care given septic shock and profound hypotension.  - Continue monitoring for A fib  - Already received 3 liters of fluid  - ID eval noted. Continue antibiotics per ID  - Ultrasound with preserved cardiac function, IVC is dilated so would not use more IVF at this time  - Lung ultrasound normal.  - Full Code.  - On eliquis for VTE Px.

## 2021-08-01 ENCOUNTER — APPOINTMENT (OUTPATIENT)
Dept: RADIOLOGY | Facility: HOSPITAL | Age: 31
End: 2021-08-01
Attending: INTERNAL MEDICINE
Payer: COMMERCIAL

## 2021-08-01 ENCOUNTER — ANESTHESIA EVENT (OUTPATIENT)
Dept: SURGERY | Facility: HOSPITAL | Age: 31
End: 2021-08-01
Payer: COMMERCIAL

## 2021-08-01 ENCOUNTER — ANESTHESIA (OUTPATIENT)
Dept: SURGERY | Facility: HOSPITAL | Age: 31
End: 2021-08-01
Payer: COMMERCIAL

## 2021-08-01 VITALS
TEMPERATURE: 97.9 F | RESPIRATION RATE: 16 BRPM | WEIGHT: 288.8 LBS | SYSTOLIC BLOOD PRESSURE: 125 MMHG | OXYGEN SATURATION: 93 % | BODY MASS INDEX: 53.15 KG/M2 | HEIGHT: 62 IN | HEART RATE: 120 BPM | DIASTOLIC BLOOD PRESSURE: 66 MMHG

## 2021-08-01 LAB
ANION GAP SERPL CALCULATED.3IONS-SCNC: 9 MMOL/L (ref 5–18)
BUN SERPL-MCNC: 10 MG/DL (ref 8–22)
CALCIUM SERPL-MCNC: 9 MG/DL (ref 8.5–10.5)
CHLORIDE BLD-SCNC: 111 MMOL/L (ref 98–107)
CO2 SERPL-SCNC: 24 MMOL/L (ref 22–31)
CREAT SERPL-MCNC: 0.69 MG/DL (ref 0.6–1.1)
ERYTHROCYTE [DISTWIDTH] IN BLOOD BY AUTOMATED COUNT: 15 % (ref 10–15)
GFR SERPL CREATININE-BSD FRML MDRD: >90 ML/MIN/1.73M2
GLUCOSE BLD-MCNC: 112 MG/DL (ref 70–125)
HCT VFR BLD AUTO: 33.7 % (ref 35–47)
HGB BLD-MCNC: 10.7 G/DL (ref 11.7–15.7)
MCH RBC QN AUTO: 27.1 PG (ref 26.5–33)
MCHC RBC AUTO-ENTMCNC: 31.8 G/DL (ref 31.5–36.5)
MCV RBC AUTO: 85 FL (ref 78–100)
PLATELET # BLD AUTO: 241 10E3/UL (ref 150–450)
POTASSIUM BLD-SCNC: 3.8 MMOL/L (ref 3.5–5)
RBC # BLD AUTO: 3.95 10E6/UL (ref 3.8–5.2)
SARS-COV-2 RNA RESP QL NAA+PROBE: NEGATIVE
SODIUM SERPL-SCNC: 144 MMOL/L (ref 136–145)
UPPER GI ENDOSCOPY: NORMAL
WBC # BLD AUTO: 10.7 10E3/UL (ref 4–11)

## 2021-08-01 PROCEDURE — 250N000011 HC RX IP 250 OP 636: Performed by: NURSE ANESTHETIST, CERTIFIED REGISTERED

## 2021-08-01 PROCEDURE — 250N000009 HC RX 250: Performed by: NURSE ANESTHETIST, CERTIFIED REGISTERED

## 2021-08-01 PROCEDURE — 96376 TX/PRO/DX INJ SAME DRUG ADON: CPT | Mod: XU

## 2021-08-01 PROCEDURE — 370N000017 HC ANESTHESIA TECHNICAL FEE, PER MIN: Performed by: INTERNAL MEDICINE

## 2021-08-01 PROCEDURE — 96375 TX/PRO/DX INJ NEW DRUG ADDON: CPT

## 2021-08-01 PROCEDURE — 360N000075 HC SURGERY LEVEL 2, PER MIN: Performed by: INTERNAL MEDICINE

## 2021-08-01 PROCEDURE — 999N000141 HC STATISTIC PRE-PROCEDURE NURSING ASSESSMENT: Performed by: INTERNAL MEDICINE

## 2021-08-01 PROCEDURE — 87635 SARS-COV-2 COVID-19 AMP PRB: CPT | Performed by: EMERGENCY MEDICINE

## 2021-08-01 PROCEDURE — 250N000013 HC RX MED GY IP 250 OP 250 PS 637: Performed by: HOSPITALIST

## 2021-08-01 PROCEDURE — 80048 BASIC METABOLIC PNL TOTAL CA: CPT | Performed by: EMERGENCY MEDICINE

## 2021-08-01 PROCEDURE — 74019 RADEX ABDOMEN 2 VIEWS: CPT

## 2021-08-01 PROCEDURE — 85014 HEMATOCRIT: CPT | Performed by: EMERGENCY MEDICINE

## 2021-08-01 PROCEDURE — 258N000003 HC RX IP 258 OP 636: Performed by: ANESTHESIOLOGY

## 2021-08-01 PROCEDURE — 250N000011 HC RX IP 250 OP 636: Performed by: HOSPITALIST

## 2021-08-01 PROCEDURE — 272N000001 HC OR GENERAL SUPPLY STERILE: Performed by: INTERNAL MEDICINE

## 2021-08-01 PROCEDURE — 250N000011 HC RX IP 250 OP 636: Performed by: EMERGENCY MEDICINE

## 2021-08-01 PROCEDURE — 99217 PR OBSERVATION CARE DISCHARGE: CPT | Performed by: HOSPITALIST

## 2021-08-01 PROCEDURE — G0378 HOSPITAL OBSERVATION PER HR: HCPCS

## 2021-08-01 PROCEDURE — 258N000003 HC RX IP 258 OP 636: Performed by: EMERGENCY MEDICINE

## 2021-08-01 RX ORDER — NALOXONE HYDROCHLORIDE 0.4 MG/ML
0.2 INJECTION, SOLUTION INTRAMUSCULAR; INTRAVENOUS; SUBCUTANEOUS
Status: DISCONTINUED | OUTPATIENT
Start: 2021-08-01 | End: 2021-08-01 | Stop reason: HOSPADM

## 2021-08-01 RX ORDER — DIPHENHYDRAMINE HYDROCHLORIDE 50 MG/ML
25 INJECTION INTRAMUSCULAR; INTRAVENOUS ONCE
Status: COMPLETED | OUTPATIENT
Start: 2021-08-01 | End: 2021-08-01

## 2021-08-01 RX ORDER — BUSPIRONE HYDROCHLORIDE 15 MG/1
15 TABLET ORAL 3 TIMES DAILY
Start: 2021-08-01 | End: 2022-02-09

## 2021-08-01 RX ORDER — LIDOCAINE 40 MG/G
CREAM TOPICAL
Status: DISCONTINUED | OUTPATIENT
Start: 2021-08-01 | End: 2021-08-01 | Stop reason: HOSPADM

## 2021-08-01 RX ORDER — DIPHENHYDRAMINE HCL 25 MG
25 TABLET ORAL EVERY 6 HOURS PRN
Status: DISCONTINUED | OUTPATIENT
Start: 2021-08-01 | End: 2021-08-01 | Stop reason: HOSPADM

## 2021-08-01 RX ORDER — NALOXONE HYDROCHLORIDE 0.4 MG/ML
0.4 INJECTION, SOLUTION INTRAMUSCULAR; INTRAVENOUS; SUBCUTANEOUS
Status: DISCONTINUED | OUTPATIENT
Start: 2021-08-01 | End: 2021-08-01 | Stop reason: HOSPADM

## 2021-08-01 RX ORDER — SODIUM CHLORIDE 9 MG/ML
INJECTION, SOLUTION INTRAVENOUS CONTINUOUS
Status: DISCONTINUED | OUTPATIENT
Start: 2021-08-01 | End: 2021-08-01

## 2021-08-01 RX ORDER — LIDOCAINE HYDROCHLORIDE 20 MG/ML
INJECTION, SOLUTION INFILTRATION; PERINEURAL PRN
Status: DISCONTINUED | OUTPATIENT
Start: 2021-08-01 | End: 2021-08-01

## 2021-08-01 RX ORDER — DIPHENHYDRAMINE HCL 25 MG
25 TABLET ORAL EVERY 6 HOURS PRN
Status: CANCELLED | OUTPATIENT
Start: 2021-08-01

## 2021-08-01 RX ORDER — MEPERIDINE HYDROCHLORIDE 25 MG/ML
12.5 INJECTION INTRAMUSCULAR; INTRAVENOUS; SUBCUTANEOUS EVERY 5 MIN PRN
Status: CANCELLED | OUTPATIENT
Start: 2021-08-01

## 2021-08-01 RX ORDER — DIPHENHYDRAMINE HYDROCHLORIDE 50 MG/ML
25 INJECTION INTRAMUSCULAR; INTRAVENOUS EVERY 6 HOURS PRN
Status: DISCONTINUED | OUTPATIENT
Start: 2021-08-01 | End: 2021-08-01

## 2021-08-01 RX ORDER — HEPARIN SODIUM,PORCINE 10 UNIT/ML
5-10 VIAL (ML) INTRAVENOUS EVERY 24 HOURS
Status: DISCONTINUED | OUTPATIENT
Start: 2021-08-01 | End: 2021-08-01 | Stop reason: HOSPADM

## 2021-08-01 RX ORDER — ONDANSETRON 2 MG/ML
4 INJECTION INTRAMUSCULAR; INTRAVENOUS EVERY 30 MIN PRN
Status: CANCELLED | OUTPATIENT
Start: 2021-08-01

## 2021-08-01 RX ORDER — HEPARIN SODIUM (PORCINE) LOCK FLUSH IV SOLN 100 UNIT/ML 100 UNIT/ML
5-10 SOLUTION INTRAVENOUS
Status: DISCONTINUED | OUTPATIENT
Start: 2021-08-01 | End: 2021-08-01 | Stop reason: HOSPADM

## 2021-08-01 RX ORDER — HALOPERIDOL 5 MG/ML
1 INJECTION INTRAMUSCULAR
Status: CANCELLED | OUTPATIENT
Start: 2021-08-01

## 2021-08-01 RX ORDER — HEPARIN SODIUM,PORCINE 10 UNIT/ML
5-10 VIAL (ML) INTRAVENOUS
Status: DISCONTINUED | OUTPATIENT
Start: 2021-08-01 | End: 2021-08-01 | Stop reason: HOSPADM

## 2021-08-01 RX ORDER — PROPOFOL 10 MG/ML
INJECTION, EMULSION INTRAVENOUS PRN
Status: DISCONTINUED | OUTPATIENT
Start: 2021-08-01 | End: 2021-08-01

## 2021-08-01 RX ORDER — GLYCOPYRROLATE 0.2 MG/ML
INJECTION, SOLUTION INTRAMUSCULAR; INTRAVENOUS PRN
Status: DISCONTINUED | OUTPATIENT
Start: 2021-08-01 | End: 2021-08-01

## 2021-08-01 RX ORDER — ONDANSETRON 2 MG/ML
INJECTION INTRAMUSCULAR; INTRAVENOUS PRN
Status: DISCONTINUED | OUTPATIENT
Start: 2021-08-01 | End: 2021-08-01

## 2021-08-01 RX ORDER — PROPOFOL 10 MG/ML
INJECTION, EMULSION INTRAVENOUS CONTINUOUS PRN
Status: DISCONTINUED | OUTPATIENT
Start: 2021-08-01 | End: 2021-08-01

## 2021-08-01 RX ORDER — SODIUM CHLORIDE, SODIUM LACTATE, POTASSIUM CHLORIDE, CALCIUM CHLORIDE 600; 310; 30; 20 MG/100ML; MG/100ML; MG/100ML; MG/100ML
INJECTION, SOLUTION INTRAVENOUS CONTINUOUS
Status: CANCELLED | OUTPATIENT
Start: 2021-08-01

## 2021-08-01 RX ORDER — MORPHINE SULFATE 4 MG/ML
4 INJECTION, SOLUTION INTRAMUSCULAR; INTRAVENOUS
Status: DISCONTINUED | OUTPATIENT
Start: 2021-08-01 | End: 2021-08-01 | Stop reason: HOSPADM

## 2021-08-01 RX ORDER — DIPHENHYDRAMINE HCL 25 MG
25 TABLET ORAL EVERY 6 HOURS PRN
Status: DISCONTINUED | OUTPATIENT
Start: 2021-08-01 | End: 2021-08-01

## 2021-08-01 RX ORDER — DIPHENHYDRAMINE HCL 25 MG
25 TABLET ORAL ONCE
Status: COMPLETED | OUTPATIENT
Start: 2021-08-01 | End: 2021-08-01

## 2021-08-01 RX ORDER — DIPHENHYDRAMINE HYDROCHLORIDE 50 MG/ML
25 INJECTION INTRAMUSCULAR; INTRAVENOUS EVERY 6 HOURS PRN
Status: CANCELLED | OUTPATIENT
Start: 2021-08-01

## 2021-08-01 RX ORDER — FENTANYL CITRATE 50 UG/ML
25 INJECTION, SOLUTION INTRAMUSCULAR; INTRAVENOUS EVERY 5 MIN PRN
Status: CANCELLED | OUTPATIENT
Start: 2021-08-01 | End: 2021-08-01

## 2021-08-01 RX ORDER — DEXAMETHASONE SODIUM PHOSPHATE 4 MG/ML
INJECTION, SOLUTION INTRA-ARTICULAR; INTRALESIONAL; INTRAMUSCULAR; INTRAVENOUS; SOFT TISSUE PRN
Status: DISCONTINUED | OUTPATIENT
Start: 2021-08-01 | End: 2021-08-01

## 2021-08-01 RX ORDER — SODIUM CHLORIDE, SODIUM LACTATE, POTASSIUM CHLORIDE, CALCIUM CHLORIDE 600; 310; 30; 20 MG/100ML; MG/100ML; MG/100ML; MG/100ML
INJECTION, SOLUTION INTRAVENOUS CONTINUOUS
Status: DISCONTINUED | OUTPATIENT
Start: 2021-08-01 | End: 2021-08-01 | Stop reason: HOSPADM

## 2021-08-01 RX ORDER — ONDANSETRON 4 MG/1
4 TABLET, ORALLY DISINTEGRATING ORAL EVERY 30 MIN PRN
Status: CANCELLED | OUTPATIENT
Start: 2021-08-01

## 2021-08-01 RX ADMIN — MORPHINE SULFATE 4 MG: 4 INJECTION, SOLUTION INTRAMUSCULAR; INTRAVENOUS at 08:47

## 2021-08-01 RX ADMIN — PROPOFOL 50 MG: 10 INJECTION, EMULSION INTRAVENOUS at 13:37

## 2021-08-01 RX ADMIN — MORPHINE SULFATE 4 MG: 4 INJECTION, SOLUTION INTRAMUSCULAR; INTRAVENOUS at 04:34

## 2021-08-01 RX ADMIN — MORPHINE SULFATE 4 MG: 4 INJECTION, SOLUTION INTRAMUSCULAR; INTRAVENOUS at 00:45

## 2021-08-01 RX ADMIN — DIPHENHYDRAMINE HCL 25 MG: 25 TABLET ORAL at 17:23

## 2021-08-01 RX ADMIN — GLYCOPYRROLATE 0.2 MG: 0.2 INJECTION, SOLUTION INTRAMUSCULAR; INTRAVENOUS at 13:35

## 2021-08-01 RX ADMIN — SODIUM CHLORIDE: 9 INJECTION, SOLUTION INTRAVENOUS at 14:23

## 2021-08-01 RX ADMIN — MIDAZOLAM HYDROCHLORIDE 2 MG: 1 INJECTION, SOLUTION INTRAMUSCULAR; INTRAVENOUS at 13:35

## 2021-08-01 RX ADMIN — DIPHENHYDRAMINE HYDROCHLORIDE 25 MG: 50 INJECTION, SOLUTION INTRAMUSCULAR; INTRAVENOUS at 09:27

## 2021-08-01 RX ADMIN — HEPARIN 5 ML: 100 SYRINGE at 17:24

## 2021-08-01 RX ADMIN — ONDANSETRON 4 MG: 2 INJECTION INTRAMUSCULAR; INTRAVENOUS at 13:47

## 2021-08-01 RX ADMIN — DIPHENHYDRAMINE HYDROCHLORIDE 25 MG: 50 INJECTION, SOLUTION INTRAMUSCULAR; INTRAVENOUS at 02:23

## 2021-08-01 RX ADMIN — MORPHINE SULFATE 4 MG: 4 INJECTION, SOLUTION INTRAMUSCULAR; INTRAVENOUS at 11:50

## 2021-08-01 RX ADMIN — SODIUM CHLORIDE: 9 INJECTION, SOLUTION INTRAVENOUS at 00:44

## 2021-08-01 RX ADMIN — PROPOFOL 150 MCG/KG/MIN: 10 INJECTION, EMULSION INTRAVENOUS at 13:35

## 2021-08-01 RX ADMIN — DEXAMETHASONE SODIUM PHOSPHATE 4 MG: 4 INJECTION, SOLUTION INTRA-ARTICULAR; INTRALESIONAL; INTRAMUSCULAR; INTRAVENOUS; SOFT TISSUE at 13:47

## 2021-08-01 RX ADMIN — DIPHENHYDRAMINE HYDROCHLORIDE 25 MG: 50 INJECTION, SOLUTION INTRAMUSCULAR; INTRAVENOUS at 14:22

## 2021-08-01 RX ADMIN — SODIUM CHLORIDE: 9 INJECTION, SOLUTION INTRAVENOUS at 11:50

## 2021-08-01 RX ADMIN — LIDOCAINE HYDROCHLORIDE 100 MG: 20 INJECTION, SOLUTION INFILTRATION; PERINEURAL at 13:39

## 2021-08-01 RX ADMIN — SODIUM CHLORIDE, POTASSIUM CHLORIDE, SODIUM LACTATE AND CALCIUM CHLORIDE: 600; 310; 30; 20 INJECTION, SOLUTION INTRAVENOUS at 13:28

## 2021-08-01 ASSESSMENT — ACTIVITIES OF DAILY LIVING (ADL)
WEAR_GLASSES_OR_BLIND: YES
CONCENTRATING,_REMEMBERING_OR_MAKING_DECISIONS_DIFFICULTY: NO
DRESSING/BATHING_DIFFICULTY: NO
FALL_HISTORY_WITHIN_LAST_SIX_MONTHS: YES
WALKING_OR_CLIMBING_STAIRS: OTHER (SEE COMMENTS)
DOING_ERRANDS_INDEPENDENTLY_DIFFICULTY: NO
DEPENDENT_IADLS:: INDEPENDENT
DIFFICULTY_COMMUNICATING: NO
NUMBER_OF_TIMES_PATIENT_HAS_FALLEN_WITHIN_LAST_SIX_MONTHS: 1
TOILETING_ISSUES: NO
HEARING_DIFFICULTY_OR_DEAF: NO
WALKING_OR_CLIMBING_STAIRS_DIFFICULTY: NO
DIFFICULTY_EATING/SWALLOWING: NO

## 2021-08-01 ASSESSMENT — LIFESTYLE VARIABLES: TOBACCO_USE: 0

## 2021-08-01 NOTE — ED PROVIDER NOTES
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 imaging. Please see official radiology report.  XR Abdomen 2 Views   Final Result   IMPRESSION:       A linear metallic foreign body is present in the epigastrium which measures around 13 cm in length and has undulation towards 1 and consistent with a straightened mickie pin. Based on position and is likely in the stomach.      Nonobstructive bowel gas pattern.             I, Nevin Steiner, am serving as a scribe to document services personally performed by Dr. Jeffy Bhardwaj, based on my observation and the provider's  statements to me. I, Jeffy Bhardwaj MD attest that Nevin Obdulia is acting in a scribe capacity, has observed my performance of the services and has documented them in accordance with my direction.    Jeffy Bhardwaj M.D.  Emergency Medicine  [facility] EMERGENCY DEPARTMENT  [address] [place] [id number]  [phone]  Dept: [phone]     Jeffy Bhardwaj MD  07/31/21 [id number]
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
## 2021-08-01 NOTE — PLAN OF CARE
Acute Traumatic Pain     1.  Pain controlled with oral analgesia: No      2.  Vital signs stable: Yes      3.  Diagnotic testing complete: No      4.  Cleared from consultants (if applicable): No      5. Return to near baseline physical activity: Yes    Patient is NPO for EGD. Independent in the room.  Received PRN IV morphine x 2 . Continue to monitor.

## 2021-08-01 NOTE — PROGRESS NOTES
Pre-procedure Note    Reason for procedure: Foreign body ingestion    History and Physical Reviewed: Reviewed, no changes.    Pre-sedation assessment:    General: alert, appears stated age and cooperative  Airway: normal  Heart: S1, S2 normal, no murmur, click, rub or gallop, regular rate and rhythm, chest is clear without rales or wheezing, no pedal edema, no JVD, no hepatosplenomegaly  Lungs: clear to auscultation bilaterally   Abdomen: Mild upper abdominal tenderness    Previous reaction to sedation: No    Sedation Plan based on assessment: Moderate    ASA Classification: Class 3    Impression: Patient deemed adequate candidate for conscious sedation    Plan: esophagogastroduodenoscopy      Keith Quinn MD  7/31/2021 10:05 PM  [facility]
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
## 2021-08-01 NOTE — ANESTHESIA CARE TRANSFER NOTE
Patient: Nevin Alvarado    Procedure(s):  ESOPHAGOGASTRODUODENOSCOPY (EGD)    Diagnosis: Swallowed foreign body, initial encounter [T18.9XXA]  Diagnosis Additional Information: No value filed.    Anesthesia Type:   MAC     Note:  Anesthesia Care Transfer Notewriter  Vitals:  Vitals Value Taken Time   /88    Temp 36.5c    Pulse 115    Resp 14    SpO2 96%        Electronically Signed By: HU Odom CRNA  August 1, 2021  1:55 PM
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
## 2021-08-01 NOTE — ED TRIAGE NOTES
Pt swallowed straightened out mickie pin tonight.  Pt has a history of ingesting inedible objects.  Pt denies SI.  Pt lives at group home but patient came by herself tonight.

## 2021-08-01 NOTE — PLAN OF CARE
PRIMARY DIAGNOSIS: ACUTE PAIN  OUTPATIENT/OBSERVATION GOALS TO BE MET BEFORE DISCHARGE:  1. Pain Status: Improved but still requiring IV narcotics.    2. Return to near baseline physical activity: Yes    3. Cleared for discharge by consultants (if involved): No    Discharge Planner Nurse   Safe discharge environment identified: Yes  Barriers to discharge: No       Entered by: Monse Stephen 08/01/2021 6:18 AM     Please review provider order for any additional goals.   Nurse to notify provider when observation goals have been met and patient is ready for discharge.
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
## 2021-08-01 NOTE — PLAN OF CARE
Problem: Adult Inpatient Plan of Care  Goal: Plan of Care Review  8/1/2021 1506 by Yelitza John, RN  Outcome: Improving  8/1/2021 1216 by Yelitza John, RN  Outcome: Improving  Flowsheets (Taken 8/1/2021 1216)  Plan of Care Reviewed With: patient   Patient had an EGD and they removed the mickie pin from her stomach.  [facility]  Social work notified of the need for a ride.

## 2021-08-01 NOTE — DISCHARGE SUMMARY
[facility] MEDICINE  DISCHARGE SUMMARY     Primary Care Physician: Latonya Knight  Admission Date: 7/31/2021   Discharge Provider: Vince Nguyen MD Discharge Date: 8/1/2021   Diet:   Active Diet and Nourishment Order   Procedures     Regular Diet Adult     Diet       Code Status: Full Code   Activity: DCACTIVITY: Activity as tolerated        Condition at Discharge: Stable     REASON FOR PRESENTATION(See Admission Note for Details)     Swallowed foreign object    PRINCIPAL & ACTIVE DISCHARGE DIAGNOSES     Principal Problem:    Swallowed foreign body, initial encounter      PENDING LABS     Unresulted Labs Ordered in the Past 30 Days of this Admission     No orders found for last 31 day(s).            PROCEDURES ( this hospitalization only)      Procedure(s):  ESOPHAGOGASTRODUODENOSCOPY (EGD)    RECOMMENDATIONS TO OUTPATIENT PROVIDER FOR F/U VISIT     Follow-up Appointments     Follow-up and recommended labs and tests       Follow up with  mental health provider             Follow-up with mental health provider    DISPOSITION     Home    SUMMARY OF HOSPITAL COURSE:      30F with long hx of swallowing foreign objects admitted after swallowing a mickie pin.  This was retrieved by EGD on a 2nd attempt (wasn't seen on initial attempt due to food in stomach).  Pain resolved and no s/s peritonitis.  No suicidal ideation.  Will f/u with mental health providers.  Ms Alvarado will be discharged back to group home.    Discharge Medications with Med changes:     Current Discharge Medication List      CONTINUE these medications which have CHANGED    Details   busPIRone (BUSPAR) 15 MG tablet Take 1 tablet (15 mg) by mouth 3 times daily    Associated Diagnoses: Anxiety disorder, unspecified type         CONTINUE these medications which have NOT CHANGED    Details   acetaminophen (TYLENOL) 500 MG tablet Take 500-1,000 mg by mouth every 8 hours as needed for mild pain       albuterol (PROAIR  HFA/PROVENTIL HFA/VENTOLIN HFA) 108 (90 Base) MCG/ACT inhaler Inhale 2 puffs into the lungs every 4 hours as needed for shortness of breath / dyspnea or wheezing     Comments: Pharmacy may dispense brand covered by insurance (Proair, or proventil or ventolin or generic albuterol inhaler)      cetirizine (ZYRTEC) 10 MG tablet Take 10 mg by mouth daily      Cholecalciferol (VITAMIN D) 50 MCG (2000 UT) CAPS Take 2,000 Units by mouth daily       cloNIDine (CATAPRES) 0.1 MG tablet Take 0.1 mg by mouth 2 times daily       desvenlafaxine (PRISTIQ) 100 MG 24 hr tablet Take 1 tablet (100 mg) by mouth every morning      diclofenac (VOLTAREN) 1 % topical gel Apply 4 g topically 4 times daily  Qty: 150 g, Refills: 0      hydroxychloroquine (PLAQUENIL) 200 MG tablet Take 200 mg by mouth 2 times daily      lurasidone (LATUDA) 60 MG TABS tablet Take 60 mg by mouth daily (with dinner)       metFORMIN (GLUCOPHAGE-XR) 500 MG 24 hr tablet Take 1,000 mg by mouth daily (with dinner)       ondansetron (ZOFRAN-ODT) 4 MG ODT tab Take 4 mg by mouth every 8 hours as needed for nausea      predniSONE (DELTASONE) 20 MG tablet Take 20 mg by mouth daily X 5 days      pregabalin (LYRICA) 100 MG capsule Take 100 mg by mouth 3 times daily       valACYclovir (VALTREX) 1000 mg tablet Take 2 tablets by mouth two times daily for one day. Use as needed at onset of cold sore.                    Rationale for medication changes:            Consults   GI    SOCIAL WORK IP CONSULT    Immunizations given this encounter     Most Recent Immunizations   Administered Date(s) Administered     COVID-19,PF,Pfizer 04/29/2021     DTAP (<7y) 04/07/1997     FLU 6-35 months 09/16/2010     Flu, Unspecified 09/26/2017     HPV 12/03/2007     HPV Quadrivalent 12/03/2007     Hep B, Peds or Adolescent 11/05/1996     HepB, Unspecified 11/05/1996     Hepatitis B Immunity: Titer 04/20/2015     Historic Hib Hib-titer 02/03/1993     Historical DTP/aP 04/06/1993     Influenza  (IIV3) PF 09/26/2017     Influenza Vaccine IM > 6 months Valent IIV4 09/26/2017     MMR 12/11/2003     OPV, trivalent, live 04/07/1997     OPV, unspecified 04/07/1997     TDAP Vaccine (Adacel) 04/16/2015     TDAP Vaccine (Boostrix) 04/16/2015     Td (Adult), Adsorbed 12/11/2003     Twinrix A/B 04/16/2015     Varicella 08/17/1995           Anticoagulation Information            SIGNIFICANT IMAGING FINDINGS     Results for orders placed or performed during the hospital encounter of 07/31/21   XR Abdomen 2 Views    Impression    IMPRESSION:     A linear metallic foreign body is present in the epigastrium which measures around 13 cm in length and has undulation towards 1 and consistent with a straightened mickie pin. Based on position and is likely in the stomach.    Nonobstructive bowel gas pattern.    XR Abdomen 2 Views    Impression    IMPRESSION:    The linear metallic foreign body remains in the left upper quadrant based on the orientation likely remains within the stomach. Nonobstructive bowel gas pattern. Prior cholecystectomy.       SIGNIFICANT LABORATORY FINDINGS       Discharge Orders        Reason for your hospital stay    You swallowed a mickie pin which was retrieved by endoscopy.     Follow-up and recommended labs and tests     Follow up with  mental health provider     Activity    Your activity upon discharge: activity as tolerated     Diet    Follow this diet upon discharge: Diabetic (2000 ADA)       Examination   Physical Exam   Temp:  [97.7  F (36.5  C)-99.3  F (37.4  C)] (P) 98.1  F (36.7  C)  Pulse:  [] (P) 95  Resp:  [15-20] (P) 15  BP: (127-172)/() (P) 165/83  SpO2:  [90 %-100 %] (P) 90 %  Wt Readings from Last 1 Encounters:   07/31/21 131 kg (288 lb 12.8 oz)       Abdomen soft, non tender.  Requesting food  No SI.      Please see EMR for more detailed significant labs, imaging, consultant notes etc.      Vince Nguyen MD  [facility]    CC:Latonya Knight

## 2021-08-01 NOTE — PLAN OF CARE
Problem: Adult Inpatient Plan of Care  Goal: Plan of Care Review  8/1/2021 0627 by Monse Stephen, RN  Outcome: Improving  8/1/2021 0623 by Monse Stephen, RN  Outcome: Improving     NPO since midnight. Continue to complain of left abdominal pain. Received PRN IV morphine x 2.  Independent in the room. Alert and oriented times 4. Has a central line on the right chest. NS running at 100 ml/hr. COVID swab came back negative. Continue to monitor.

## 2021-08-01 NOTE — PHARMACY-ADMISSION MEDICATION HISTORY
Pharmacy Note - Admission Medication History    Pertinent Provider Information: Pt recently prescribed prednisone 20 mg daily X 5 days (7/31-8/4)     ______________________________________________________________________    Prior To Admission (PTA) med list completed and updated in EMR.       PTA Med List   Medication Sig Last Dose     acetaminophen (TYLENOL) 500 MG tablet Take 500-1,000 mg by mouth every 8 hours as needed for mild pain       albuterol (PROAIR HFA/PROVENTIL HFA/VENTOLIN HFA) 108 (90 Base) MCG/ACT inhaler Inhale 2 puffs into the lungs every 4 hours as needed for shortness of breath / dyspnea or wheezing       busPIRone (BUSPAR) 15 MG tablet Take 1 tablet (15 mg) by mouth 2 times daily (Patient taking differently: Take 15 mg by mouth 3 times daily ) 7/31/2021 at 1400     cetirizine (ZYRTEC) 10 MG tablet Take 10 mg by mouth daily 7/30/2021 at pm     Cholecalciferol (VITAMIN D) 50 MCG (2000 UT) CAPS Take 2,000 Units by mouth daily  7/31/2021 at Unknown time     cloNIDine (CATAPRES) 0.1 MG tablet Take 0.1 mg by mouth 2 times daily  7/31/2021 at Unknown time     desvenlafaxine (PRISTIQ) 100 MG 24 hr tablet Take 1 tablet (100 mg) by mouth every morning 7/31/2021 at Unknown time     diclofenac (VOLTAREN) 1 % topical gel Apply 4 g topically 4 times daily (Patient taking differently: Apply 4 g topically 4 times daily as needed )      hydroxychloroquine (PLAQUENIL) 200 MG tablet Take 200 mg by mouth 2 times daily 7/31/2021 at Unknown time     lurasidone (LATUDA) 60 MG TABS tablet Take 60 mg by mouth daily (with dinner)  7/30/2021 at Unknown time     metFORMIN (GLUCOPHAGE-XR) 500 MG 24 hr tablet Take 1,000 mg by mouth daily (with dinner)  7/31/2021 at Unknown time     ondansetron (ZOFRAN-ODT) 4 MG ODT tab Take 4 mg by mouth every 8 hours as needed for nausea      predniSONE (DELTASONE) 20 MG tablet Take 20 mg by mouth daily X 5 days      pregabalin (LYRICA) 100 MG capsule Take 100 mg by mouth 3 times daily   7/31/2021 at 1400     valACYclovir (VALTREX) 1000 mg tablet Take 2 tablets by mouth two times daily for one day. Use as needed at onset of cold sore.         Information source(s): Patient, Hospital records and CareEverywhere/[facility]  Method of interview communication: in-person    Summary of Changes to PTA Med List  New: Prednisone  Discontinued:    Changed: Pregabalin    Patient was asked about OTC/herbal products specifically.  PTA med list reflects this.    In the past week, patient estimated taking medication this percent of the time:  greater than 90%.    Allergies were reviewed, assessed, and updated with the patient.      Patient does not use any multi-dose medications prior to admission.    The information provided in this note is only as accurate as the sources available at the time of the update(s).    Thank you for the opportunity to participate in the care of this patient.    Gustavo Watson RPH  7/31/2021 11:15 PM

## 2021-08-01 NOTE — CONSULTS
Care Management Initial Consult    General Information  Assessment completed with: Patient,  (Nevin Alvarado)  Type of CM/SW Visit: Initial Assessment    Primary Care Provider verified and updated as needed: Yes     Readmission within the last 30 days: no previous admission in last 30 days    Reason for Consult: discharge planning    Advance Care Planning: Advance  Care Planning Reviewed: no concerns identified     General Information Comments:  (Pt resides from  group home and independent at baseline. )    Communication Assessment  Patient's communication style: spoken language (English or Bilingual)    Hearing Difficulty or Deaf: no   Wear Glasses or Blind: no    Cognitive  Cognitive/Neuro/Behavioral: mood/behavior, .WDL except           Mood/Behavior: anxious, sad          Living Environment:   People in home: other relative(s)  Group home  Current living Arrangements: group home      Able to return to prior arrangements: yes  Living Arrangement Comments:  (lives with group home with alone and independent at baseline)    Family/Social Support:  Care provided by: other [employer]  Guardian      Provides care for: other       Marital Status: Single          Description of Support System: Supportive    Support Assessment: Adequate family and caregiver support    Current Resources:   Patient receiving home care services: None     Community Resources: Group Home  Equipment currently used at home: none  Supplies currently used at home: None    Employment/Financial:  Employment Status: employment seeking        Financial Concerns:   None         Lifestyle & Psychosocial Needs:  Social Determinants of Health     Tobacco Use: Low Risk      Smoking Tobacco Use: Never Smoker     Smokeless Tobacco Use: Never Used   Alcohol Use:      Frequency of Alcohol Consumption:      Average Number of Drinks:      Frequency of Binge Drinking:    Financial Resource Strain:      Difficulty of Paying Living Expenses:    Food  Insecurity:      Worried About Running Out of Food in the Last Year:      Ran Out of Food in the Last Year:    Transportation Needs:      Lack of Transportation (Medical):      Lack of Transportation (Non-Medical):    Physical Activity:      Days of Exercise per Week:      Minutes of Exercise per Session:    Stress:      Feeling of Stress :    Social Connections:      Frequency of Communication with Friends and Family:      Frequency of Social Gatherings with Friends and Family:      Attends Protestant Services:      Active Member of Clubs or Organizations:      Attends Club or Organization Meetings:      Marital Status:    Intimate Partner Violence:      Fear of Current or Ex-Partner:      Emotionally Abused:      Physically Abused:      Sexually Abused:    Depression:      PHQ-2 Score:    Housing Stability:      Unable to Pay for Housing in the Last Year:      Number of Places Lived in the Last Year:      Unstable Housing in the Last Year:        Functional Status:  Prior to admission patient needed assistance:   Dependent ADLs:: Independent  Dependent IADLs:: Independent       Mental Health Status:          Chemical Dependency Status:               Values/Beliefs:  Spiritual, Cultural Beliefs, Protestant Practices, Values that affect care:                 Additional Information:      ALLAN RAPHAEL met with patient in her room and introduce self and CM role and complete patient initial assessment & discussed her discharge plan. Pt reports she came from group home and independent at baseline. Pt reports she swallowing foreign objects admitted  after swallowing a michael pin. This is her 2nd attempt. Pt reports her discharge goal is return to group home when she is medically to stable. Pt is going to discharge today. ALLAN RAPHAEL spoke with her discharge plan and she is agreeable to go group home. ALLAN RAPHAEL left VM patient Group home and left voice message patient Guardian. ALLAN RAPHAEL set up her [id number] at 530 pm. ALLAN RAPHAEL updated  floor nurse and charge nurse patient discharge plan and ride time.  CM to continue following as needed.     Massiel Parr   MSW/DYLAN/IJOEMA   8/1/2021

## 2021-08-01 NOTE — ANESTHESIA POSTPROCEDURE EVALUATION
Patient: Nevin Alvarado    Procedure(s):  ESOPHAGOGASTRODUODENOSCOPY (EGD)    Diagnosis:Swallowed foreign body, initial encounter [T18.9XXA]  Diagnosis Additional Information: No value filed.    Anesthesia Type:  MAC    Note:  Disposition: Inpatient   Postop Pain Control: Uneventful            Sign Out: Well controlled pain   PONV: No   Neuro/Psych:    Airway/Respiratory: Uneventful            Sign Out: Acceptable/Baseline resp. status   CV/Hemodynamics: Uneventful            Sign Out: Acceptable CV status; No obvious hypovolemia; No obvious fluid overload   Other NRE: NONE   DID A NON-ROUTINE EVENT OCCUR? No           Last vitals:  Vitals Value Taken Time   /83 08/01/21 1509   Temp 36.7  C (98.1  F) 08/01/21 1509   Pulse 95 08/01/21 1509   Resp 15 08/01/21 1509   SpO2 90 % 08/01/21 1509       Electronically Signed By: Clara Winston MD  August 1, 2021  4:17 PM

## 2021-08-01 NOTE — ANESTHESIA CARE TRANSFER NOTE
Patient: Nevin Alvarado    Procedure(s):  ESOPHAGOGASTRODUODENOSCOPY (EGD)    Diagnosis: Swallowed foreign body, initial encounter [T18.9XXA]  Diagnosis Additional Information: No value filed.    Anesthesia Type:   MAC     Note:    Oropharynx: oropharynx clear of all foreign objects  Level of Consciousness: awake  Oxygen Supplementation: room air    Independent Airway: airway patency satisfactory and stable  Dentition: dentition unchanged  Vital Signs Stable: post-procedure vital signs reviewed and stable  Report to RN Given: handoff report given  Patient transferred to: Medical/Surgical Unit    Handoff Report: Identifed the Patient, Identified the Reponsible Provider, Reviewed the pertinent medical history, Discussed the surgical course, Reviewed Intra-OP anesthesia mangement and issues during anesthesia, Set expectations for post-procedure period and Allowed opportunity for questions and acknowledgement of understanding      Vitals:  Vitals Value Taken Time   /88    Temp 36c    Pulse 115    Resp 16    SpO2 95%        Electronically Signed By: HU Odom CRNA  August 1, 2021  1:56 PM

## 2021-08-01 NOTE — PLAN OF CARE
Problem: Adult Inpatient Plan of Care  Goal: Plan of Care Review  Outcome: Improving  Flowsheets (Taken 8/1/2021 1216)  Plan of Care Reviewed With: patient   Patient was spoken to about scheduled EGD and continued NPO status.      Problem: Adult Inpatient Plan of Care  Goal: Optimal Comfort and Wellbeing  Outcome: Improving   Patient has been comforted and given emotional support by nursing staff and social work regarding her worries and concerns.  She has been given IV Morphine twice for abdominal pain.

## 2021-08-01 NOTE — PROVIDER NOTIFICATION
Pt C/O trouble swallowing after EGD. Pt had pancakes. Notify MD of pt concern prior to Discharge.

## 2021-08-01 NOTE — PRE-PROCEDURE
GENERAL PRE-PROCEDURE:   Procedure:  Esophagogastroduodenoscopy   Date/Time:  8/1/2021 1:24 PM    Verbal consent obtained?: Yes    Written consent obtained?: Yes    Risks and benefits: Risks, benefits and alternatives were discussed    Consent given by:  Patient  Patient states understanding of procedure being performed: Yes    Patient's understanding of procedure matches consent: Yes    Procedure consent matches procedure scheduled: Yes    Expected level of sedation:  Moderate  Appropriately NPO:  Yes  Mallampati  :  Grade 3- soft palate visible, posterior pharyngeal wall not visible  Lungs:  Lungs clear with good breath sounds bilaterally  Heart:  Normal heart sounds and rate  History & Physical reviewed:  History and physical reviewed and no updates needed  Statement of review:  I have reviewed the lab findings, diagnostic data, medications, and the plan for sedation

## 2021-08-02 NOTE — PLAN OF CARE
PRIMARY DIAGNOSIS: Post EGD retrieval of foreign body.  OUTPATIENT/OBSERVATION GOALS TO BE MET BEFORE DISCHARGE:  1. Stable vital signs Yes  2. Tolerating diet:Yes  3. Pain controlled with oral pain medications:  Yes  4. Positive bowel sounds:  Yes  5. Voiding without difficulty:  Yes  6. Able to ambulate:  Yes  7. Provider specific discharge goals met:  Yes    Discharge Planner Nurse   Safe discharge environment identified: Yes  Barriers to discharge: No       Entered by: Danni Arzola 08/01/2021 10:50 PM   Called report to Julisa at [facility]. All belongings sent with pt. Pt discharged to [facility].   Please review provider order for any additional goals.   Nurse to notify provider when observation goals have been met and patient is ready for discharge.

## 2021-08-12 ENCOUNTER — TRANSFERRED RECORDS (OUTPATIENT)
Dept: HEALTH INFORMATION MANAGEMENT | Facility: CLINIC | Age: 31
End: 2021-08-12

## 2021-08-12 ENCOUNTER — HOSPITAL ENCOUNTER (OUTPATIENT)
Facility: HOSPITAL | Age: 31
Setting detail: OBSERVATION
Discharge: HOME | End: 2021-08-13
Attending: FAMILY MEDICINE | Admitting: SURGERY
Payer: COMMERCIAL

## 2021-08-12 ENCOUNTER — NURSE TRIAGE (OUTPATIENT)
Dept: NURSING | Facility: CLINIC | Age: 31
End: 2021-08-12

## 2021-08-12 ENCOUNTER — APPOINTMENT (OUTPATIENT)
Dept: RADIOLOGY | Facility: HOSPITAL | Age: 31
End: 2021-08-12
Attending: FAMILY MEDICINE
Payer: COMMERCIAL

## 2021-08-12 DIAGNOSIS — Z11.59 ENCOUNTER FOR SCREENING FOR OTHER VIRAL DISEASES: ICD-10-CM

## 2021-08-12 DIAGNOSIS — T18.9XXA SWALLOWED FOREIGN BODY, INITIAL ENCOUNTER: ICD-10-CM

## 2021-08-12 PROCEDURE — 74018 RADEX ABDOMEN 1 VIEW: CPT

## 2021-08-12 PROCEDURE — 99285 EMERGENCY DEPT VISIT HI MDM: CPT | Mod: 25

## 2021-08-12 ASSESSMENT — MIFFLIN-ST. JEOR: SCORE: 1975.54

## 2021-08-12 ASSESSMENT — ENCOUNTER SYMPTOMS: ABDOMINAL PAIN: 1

## 2021-08-12 NOTE — TELEPHONE ENCOUNTER
"Pt reports eating chicken for supper tonight, about 30-40 mins ago, pt reports she swallowed chicken bone while \"chewing too fast\". Pt reports it feels stuck in throat and is painful. Pt denies difficulty breathing or swallowing, or drooling. Pt reports she ate a piece of bread and it didn't get lodged in throat, but pain and sensation that bone is present remain.     Per RN triage protocol to ED now. Pt will have someone drive her. Pt verbalizes understanding and acceptance of plan.     COVID 19 Nurse Triage Plan/Patient Instructions    Please be aware that novel coronavirus (COVID-19) may be circulating in the community. If you develop symptoms such as fever, cough, or SOB or if you have concerns about the presence of another infection including coronavirus (COVID-19), please contact your health care provider or visit [email or web address].     Disposition/Instructions    ED Visit recommended. Follow protocol based instructions.     Bring Your Own Device:  Please also bring your smart device(s) (smart phones, tablets, laptops) and their charging cables for your personal use and to communicate with your care team during your visit.    Thank you for taking steps to prevent the spread of this virus.  o Limit your contact with others.  o Wear a simple mask to cover your cough.  o Wash your hands well and often.    Resources    [facility]: About COVID-19: [email or web address]    CDC: What to Do If You're Sick: www.cdc.gov/coronavirus/2019-ncov/about/steps-when-sick.html    CDC: Ending Home Isolation: www.cdc.gov/coronavirus/2019-ncov/hcp/disposition-in-home-patients.html     CDC: Caring for Someone: www.cdc.gov/coronavirus/2019-ncov/if-you-are-sick/care-for-someone.html     [facility]: Interim Guidance for Hospital Discharge to Home: www.health.[facility].[place]/diseases/coronavirus/hcp/hospdischarge.pdf    [facility] clinical trials (COVID-19 research studies): " [email or web address].[email or web address]-clinical-trials     Below are the COVID-19 hotlines at the [place] [facility] ([facility]). Interpreters are available.   o For health questions: Call 068-483-1814 or 1-636.580.5992 (7 a.m. to 7 p.m.)  o For questions about schools and childcare: Call 676-566-4701 or 1-513.857.6924 (7 a.m. to 7 p.m.)                     Reason for Disposition    Symptoms of FB stuck in throat or esophagus (e.g., continued pain in throat or chest, FB sensation, blood-tinged saliva) (Exception: pill stuck)    Additional Information    Negative: Any difficulty breathing (e.g., coughing, wheezing or stridor)    Negative: Sounds like a life-threatening emergency to the triager    Negative: Choked on or inhaled food or foreign body (but did not swallow it)    Negative: Symptoms of blocked esophagus (e.g., can't swallow normal secretions, drooling)    Protocols used: SWALLOWED FOREIGN BODY-A-AH

## 2021-08-13 ENCOUNTER — APPOINTMENT (OUTPATIENT)
Dept: RADIOLOGY | Facility: HOSPITAL | Age: 31
End: 2021-08-13
Attending: EMERGENCY MEDICINE
Payer: COMMERCIAL

## 2021-08-13 ENCOUNTER — APPOINTMENT (OUTPATIENT)
Dept: CT IMAGING | Facility: HOSPITAL | Age: 31
End: 2021-08-13
Attending: FAMILY MEDICINE
Payer: COMMERCIAL

## 2021-08-13 VITALS
SYSTOLIC BLOOD PRESSURE: 128 MMHG | HEART RATE: 84 BPM | HEIGHT: 62 IN | TEMPERATURE: 98.5 F | OXYGEN SATURATION: 95 % | RESPIRATION RATE: 18 BRPM | WEIGHT: 286 LBS | DIASTOLIC BLOOD PRESSURE: 80 MMHG | BODY MASS INDEX: 52.63 KG/M2

## 2021-08-13 LAB
SARS-COV-2 RNA RESP QL NAA+PROBE: NEGATIVE
UPPER GI ENDOSCOPY: NORMAL
UPPER GI ENDOSCOPY: NORMAL

## 2021-08-13 PROCEDURE — C9803 HOPD COVID-19 SPEC COLLECT: HCPCS

## 2021-08-13 PROCEDURE — 74176 CT ABD & PELVIS W/O CONTRAST: CPT

## 2021-08-13 PROCEDURE — 999N000099 HC STATISTIC MODERATE SEDATION < 10 MIN: Performed by: INTERNAL MEDICINE

## 2021-08-13 PROCEDURE — G0500 MOD SEDAT ENDO SERVICE >5YRS: HCPCS | Performed by: INTERNAL MEDICINE

## 2021-08-13 PROCEDURE — 74018 RADEX ABDOMEN 1 VIEW: CPT

## 2021-08-13 PROCEDURE — 250N000009 HC RX 250: Performed by: INTERNAL MEDICINE

## 2021-08-13 PROCEDURE — 250N000011 HC RX IP 250 OP 636: Performed by: INTERNAL MEDICINE

## 2021-08-13 PROCEDURE — 250N000011 HC RX IP 250 OP 636

## 2021-08-13 PROCEDURE — 87635 SARS-COV-2 COVID-19 AMP PRB: CPT | Performed by: FAMILY MEDICINE

## 2021-08-13 PROCEDURE — 43235 EGD DIAGNOSTIC BRUSH WASH: CPT | Performed by: INTERNAL MEDICINE

## 2021-08-13 PROCEDURE — G0378 HOSPITAL OBSERVATION PER HR: HCPCS

## 2021-08-13 PROCEDURE — 250N000011 HC RX IP 250 OP 636: Performed by: FAMILY MEDICINE

## 2021-08-13 PROCEDURE — 250N000013 HC RX MED GY IP 250 OP 250 PS 637: Performed by: FAMILY MEDICINE

## 2021-08-13 RX ORDER — PANTOPRAZOLE SODIUM 20 MG/1
40 TABLET, DELAYED RELEASE ORAL ONCE
Status: COMPLETED | OUTPATIENT
Start: 2021-08-13 | End: 2021-08-13

## 2021-08-13 RX ORDER — HEPARIN SODIUM (PORCINE) LOCK FLUSH IV SOLN 100 UNIT/ML 100 UNIT/ML
SOLUTION INTRAVENOUS
Status: COMPLETED
Start: 2021-08-13 | End: 2021-08-13

## 2021-08-13 RX ORDER — FENTANYL CITRATE 50 UG/ML
INJECTION, SOLUTION INTRAMUSCULAR; INTRAVENOUS PRN
Status: COMPLETED | OUTPATIENT
Start: 2021-08-13 | End: 2021-08-13

## 2021-08-13 RX ORDER — ONDANSETRON 4 MG/1
4 TABLET, ORALLY DISINTEGRATING ORAL ONCE
Status: COMPLETED | OUTPATIENT
Start: 2021-08-13 | End: 2021-08-13

## 2021-08-13 RX ORDER — LIDOCAINE 40 MG/G
CREAM TOPICAL
Status: CANCELLED | OUTPATIENT
Start: 2021-08-13

## 2021-08-13 RX ORDER — ACETAMINOPHEN 325 MG/1
650 TABLET ORAL ONCE
Status: COMPLETED | OUTPATIENT
Start: 2021-08-13 | End: 2021-08-13

## 2021-08-13 RX ORDER — DIPHENHYDRAMINE HYDROCHLORIDE 50 MG/ML
INJECTION INTRAMUSCULAR; INTRAVENOUS PRN
Status: COMPLETED | OUTPATIENT
Start: 2021-08-13 | End: 2021-08-13

## 2021-08-13 RX ORDER — LIDOCAINE 40 MG/G
CREAM TOPICAL
Status: DISCONTINUED | OUTPATIENT
Start: 2021-08-13 | End: 2021-08-13 | Stop reason: HOSPADM

## 2021-08-13 RX ORDER — HEPARIN SODIUM (PORCINE) LOCK FLUSH IV SOLN 100 UNIT/ML 100 UNIT/ML
100 SOLUTION INTRAVENOUS ONCE
Status: COMPLETED | OUTPATIENT
Start: 2021-08-13 | End: 2021-08-13

## 2021-08-13 RX ADMIN — HEPARIN SODIUM (PORCINE) LOCK FLUSH IV SOLN 100 UNIT/ML 100 UNITS: 100 SOLUTION at 14:11

## 2021-08-13 RX ADMIN — MIDAZOLAM HYDROCHLORIDE 4 MG: 1 INJECTION, SOLUTION INTRAMUSCULAR; INTRAVENOUS at 08:52

## 2021-08-13 RX ADMIN — ACETAMINOPHEN 650 MG: 325 TABLET ORAL at 05:26

## 2021-08-13 RX ADMIN — DIPHENHYDRAMINE HYDROCHLORIDE 25 MG: 50 INJECTION INTRAMUSCULAR; INTRAVENOUS at 11:51

## 2021-08-13 RX ADMIN — BENZOCAINE 2 SPRAY: 220 SPRAY, METERED PERIODONTAL at 08:52

## 2021-08-13 RX ADMIN — HEPARIN 100 UNITS: 100 SYRINGE at 14:11

## 2021-08-13 RX ADMIN — PANTOPRAZOLE SODIUM 40 MG: 20 TABLET, DELAYED RELEASE ORAL at 01:54

## 2021-08-13 RX ADMIN — MIDAZOLAM HYDROCHLORIDE 2 MG: 1 INJECTION, SOLUTION INTRAMUSCULAR; INTRAVENOUS at 08:55

## 2021-08-13 RX ADMIN — FENTANYL CITRATE 1 MCG: 50 INJECTION, SOLUTION INTRAMUSCULAR; INTRAVENOUS at 08:52

## 2021-08-13 RX ADMIN — MIDAZOLAM HYDROCHLORIDE 2 MG: 1 INJECTION, SOLUTION INTRAMUSCULAR; INTRAVENOUS at 11:47

## 2021-08-13 RX ADMIN — FENTANYL CITRATE 100 MCG: 50 INJECTION, SOLUTION INTRAMUSCULAR; INTRAVENOUS at 11:45

## 2021-08-13 RX ADMIN — MIDAZOLAM HYDROCHLORIDE 2 MG: 1 INJECTION, SOLUTION INTRAMUSCULAR; INTRAVENOUS at 11:45

## 2021-08-13 RX ADMIN — ONDANSETRON 4 MG: 4 TABLET, ORALLY DISINTEGRATING ORAL at 01:29

## 2021-08-13 NOTE — ED NOTES
Expected Patient Referral to ED  9:35 PM    Referring Clinic/Provider:  [facility]    Reason for referral/Clinical facts:  SI. Ingested a twist tie wire- visible on XR in stomach    Recommendations provided:  Send to ED    Caller was informed that this institution does  possess the capabilities and/or resources to provide for patient and should be transferred to our institution.    Based on the information provided, discussed that this patient likely is not a good candidate for direct admission to this institution and that provider could proceed as such.  If however direct admit is sought and any hurdles encountered, this ED would be happy to see the patient and evaluate.    Informed caller that recommendations provided are recommendations based only on the facts provided and that they responsible to accept or reject the advice, or to seek a formal in person consultation as needed and that this ED will see/treat patient should they arrive.      Madeline Hartmann MD  Emergency Medicine  [facility] EMERGENCY DEPARTMENT  [address] [place] [id number]  [phone]       Madeline Hartmann MD  08/12/21 [id number]

## 2021-08-13 NOTE — ED PROVIDER NOTES
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 Unknown)   SpO2 98%   BMI 52.31 kg/m      PHYSICAL EXAM:  Physical Exam  Vitals and nursing note reviewed.   Constitutional:       Appearance: Normal appearance.   HENT:      Head: Normocephalic and atraumatic.      Right Ear: External ear normal.      Left Ear: External ear normal.      Nose: Nose normal.      Mouth/Throat:      Mouth: Mucous membranes are moist.   Eyes:      Extraocular Movements: Extraocular movements intact.      Conjunctiva/sclera: Conjunctivae normal.     "  Pupils: Pupils are equal, round, and reactive to light.   Cardiovascular:      Rate and Rhythm: Normal rate and regular rhythm.   Pulmonary:      Effort: Pulmonary effort is normal.      Breath sounds: Normal breath sounds. No wheezing or rales.   Abdominal:      General: Abdomen is flat. There is no distension.      Palpations: Abdomen is soft.      Tenderness: There is no abdominal tenderness. There is no guarding.   Musculoskeletal:         General: Normal range of motion.      Cervical back: Normal range of motion and neck supple.      Right lower leg: No edema.      Left lower leg: No edema.   Lymphadenopathy:      Cervical: No cervical adenopathy.   Skin:     General: Skin is warm and dry.   Neurological:      General: No focal deficit present.      Mental Status: She is alert and oriented to person, place, and time. Mental status is at baseline.      Comments: No gross focal neurologic deficits   Psychiatric:         Mood and Affect: Mood normal.         Behavior: Behavior normal.         Thought Content: Thought content normal.       LAB:  All pertinent labs reviewed and interpreted.  Results for orders placed or performed during the hospital encounter of 08/12/21   Abdomen XR 1 vw    Impression    IMPRESSION: 7 cm linear metallic foreign body remains within the stomach consistent with history foreign body ingestion. Cholecystectomy clips. No visible small bowel dilatation. Pelvic vascular calcifications.    CT Abdomen Pelvis w/o Contrast    Impression    IMPRESSION:   1.  Linear metallic foreign body within the stomach.  2.  No bowel obstruction or abscess.     SARS-COV2 (COVID-19) Virus RT-PCR    Specimen: Nasopharyngeal; Swab   Result Value Ref Range    SARS CoV2 PCR Negative Negative       RADIOLOGY:  Reviewed all pertinent imaging. Please see official radiology report.  CT Abdomen Pelvis w/o Contrast   Final Result   IMPRESSION:    1.  Linear metallic foreign body within the stomach.   2.  No bowel  obstruction or abscess.         Abdomen XR 1 vw   Final Result   IMPRESSION: 7 cm linear metallic foreign body remains within the stomach consistent with history foreign body ingestion. Cholecystectomy clips. No visible small bowel dilatation. Pelvic vascular calcifications.           I, Jeanie Tucker, am serving as a scribe to document services personally performed by Dr. Flynn based on my observation and the provider's statements to me. I, Reece Flynn MD attest that Jeanie Tucker is acting in a scribe capacity, has observed my performance of the services and has documented them in accordance with my direction.    Reece Flynn M.D.  Emergency Medicine  [facility] EMERGENCY DEPARTMENT  [address] [address] [phone]  Dept: [phone]     Reece Flynn MD  08/13/21 033

## 2021-08-13 NOTE — ED NOTES
Pt reports she swallowed wire off of her surgical mask, provider notified and ordered imaging. 1:1 in room with pt. Denies sob and pain. Vitals stable.

## 2021-08-13 NOTE — ED NOTES
Pt informed staff that she ate the metal out of her face mask upon arrival to her room from GI. Staff will be going into her room to maintain pt safety. Pt requesting to talk to .

## 2021-08-13 NOTE — ED NOTES
Pt is 1:1 and  watched through the computer for safety. denies shortness of breath. Does report some pain on abdomen which she received pain medication earlier(see MAR) pt is calm and cooperative with care.

## 2021-08-13 NOTE — H&P
GENERAL PRE-PROCEDURE:   Procedure:  EGD - Gastric foreign body  Date/Time:  8/13/2021 8:46 AM    Verbal consent obtained?: Yes    Written consent obtained?: Yes    Risks and benefits: Risks, benefits and alternatives were discussed    Consent given by:  Patient  Patient states understanding of procedure being performed: Yes    Patient's understanding of procedure matches consent: Yes    Procedure consent matches procedure scheduled: Yes    Expected level of sedation:  Deep  Appropriately NPO:  Yes  Mallampati  :  Grade 2- soft palate, base of uvula, tonsillar pillars, and portion of posterior pharyngeal wall visible  Lungs:  Lungs clear with good breath sounds bilaterally  Heart:  Normal heart sounds and rate  History & Physical reviewed:  History and physical reviewed and no updates needed  Statement of review:  I have reviewed the lab findings, diagnostic data, medications, and the plan for sedation

## 2021-08-13 NOTE — ED NOTES
Pt arrived back to ER from GI procedure around 1230.. Pt was assisted into bed, and given a warm blanket. Pt is relaxing back in bed with 1-1 staff in room for safety.

## 2021-08-13 NOTE — ED PROVIDER NOTES
eMERGENCY dEPARTMENT PROGRESS NOTE      Patient was signed out to me by Dr. Flynn at 7:41 AM.        LABS  Pertinent lab results reviewed in chart.  Results for orders placed or performed during the hospital encounter of 08/12/21   Result Value Ref Range    Upper GI Endoscopy       [facility]  [address] [place] [address]  _______________________________________________________________________________  Patient Name: Nevin Alvarado     Procedure Date: 8/13/2021 8:32 AM  MRN: [id number]                       Account Number: [id number]  YOB: 1990             Admit Type: Emergency Department  Age: 30                                Note Status: Finalized  Attending MD: Gustavo Mathew MD        Instrument Name: EGD Scope 0211  _______________________________________________________________________________     Procedure:           Upper GI endoscopy  Indications:         Gastric Foreign Body  Providers:           Gustavo Mathew MD  Patient Profile:     This is a 30 year old female.  Referring MD:          Medicines:           Midazolam 6 mg IV, Fentanyl 100 micrograms IV,                        Diphenhydramine 25 mg IV, Benzocaine spray  Complications:       No immediate complications.  _________________________________ ______________________________________________  Procedure:           Pre-Anesthesia Assessment:                       - Prior to the procedure, a History and Physical was                        performed, and patient medications, allergies and                        sensitivities were reviewed. The patient's tolerance of                        previous anesthesia was reviewed.                       After obtaining informed consent, the endoscope was                        passed under direct vision. Throughout the procedure,                        the patient's blood pressure, pulse, and oxygen                        saturations were monitored  continuously. The endoscope                        was introduced through the mouth, and advanced to the                        second part of duodenum. The upper GI endoscopy was                        accomplished without difficulty. The patient tolerated                        the procedure well.                                                                                    Findings:       The esophagus was normal.       A twist tie were found in the gastric body. Removal was accomplished        with a rat-toothed forceps.       The examined duodenum was normal.                                                                                   Moderate Sedation:       Moderate (conscious) sedation was administered by the endoscopy nurse        and supervised by the endoscopist. The following parameters were        monitored: oxygen saturation, heart rate, blood pressure, and response        to care.  Impression:          - Normal esophagus.                       - A twist tie were found in the stomach. Removal was                        successful.                       - Normal examined duodenum.  Recommendation:      - Discharge patient to home.                       - Resume regular diet.                                                                                     Gustavo Mathew MD  _______________  KENNY Barbosa  8/13/2021 9:04:11 AM  I was physically present for the entire viewing portion of the exam.  __________________________  Signature of teaching physician  [id number] Mathew MD  Number of Addenda: 0    Note Initiated On: 8/13/2021 8:32 AM  Scope In: 8:57:49 AM  Scope Out: 9:00:19 AM     Result Value Ref Range    Upper GI Endoscopy       [facility]  [address] [place] [address]  _______________________________________________________________________________  Patient Name: Nevin Alvarado     Procedure Date: 8/13/2021 11:33 AM  MRN: [id number]                        Account Number: [id number]  YOB: 1990             Admit Type: Emergency Department  Age: 30                                Note Status: Finalized  Attending MD: Gustavo Mathew MD        Instrument Name: EGD Scope 1129  _______________________________________________________________________________     Procedure:           Upper GI endoscopy  Indications:         Foreign body in the stomach  Providers:           Gustavo Mathew MD  Patient Profile:     This is a 30 year old female.  Referring MD:          Medicines:           Fentanyl 100 micrograms IV, Midazolam 4 mg IV,                        Diphenhydramine 25 mg IV  Complications:       No immediate complications.  ___________________________________________ ____________________________________  Procedure:           Pre-Anesthesia Assessment:                       - Prior to the procedure, a History and Physical was                        performed, and patient medications, allergies and                        sensitivities were reviewed. The patient's tolerance of                        previous anesthesia was reviewed.                       After obtaining informed consent, the endoscope was                        passed under direct vision. Throughout the procedure,                        the patient's blood pressure, pulse, and oxygen                        saturations were monitored continuously. The endoscope                        was introduced through the mouth, and advanced to the                        second part of duodenum. The upper GI endoscopy was                        accomplished without difficulty. The patient tolerated                        the procedure well.                                                                                    Findings:       The esophagus was normal.       Metal nose part of face mask were found in the gastric body. Removal was        accomplished with a rat-toothed forceps.       The  examined duodenum was normal.                                                                                   Moderate Sedation:       Moderate (conscious) sedation was administered by the endoscopy nurse        and supervised by the endoscopist. The following parameters were        monitored: oxygen saturation, heart rate, blood pressure, and response        to care.  Impression:          - Normal esophagus.                       - Metal nose part of face mask were found in the                        stomach. Removal was successful.                       - Normal examined duodenum.  Recommendation:      - Discharge patient to home.                                                                                     Gustavo Mathew MD  _______________  Gustavo Mathew MD  8/13/2021 12:31:11  PM  I was physically present for the entire viewing portion of the exam.  __________________________  Signature of teaching physician  [id number] Mathew MD  Number of Addenda: 0    Note Initiated On: 8/13/2021 11:33 AM  Scope In: 11:53:41 AM  Scope Out: 11:55:24 AM     Abdomen XR 1 vw    Impression    IMPRESSION: 7 cm linear metallic foreign body remains within the stomach consistent with history foreign body ingestion. Cholecystectomy clips. No visible small bowel dilatation. Pelvic vascular calcifications.    CT Abdomen Pelvis w/o Contrast    Impression    IMPRESSION:   1.  Linear metallic foreign body within the stomach.  2.  No bowel obstruction or abscess.     Abdomen XR 1 vw    Impression    IMPRESSION: A linear metallic object overlies the stomach. Cholecystectomy clips are present. No intraperitoneal free air appreciated on these supine images.   SARS-COV2 (COVID-19) Virus RT-PCR    Specimen: Nasopharyngeal; Swab   Result Value Ref Range    SARS CoV2 PCR Negative Negative       ED COURSE & MEDICAL DECISION MAKING    Pertinent Labs and Imagaing reviewed (see chart for details)    30 year old female who swallowed foreign  body consistent with a twist tie.  Patient taken to EGD by GI, had a successfully removed.  Recovered well from anesthesia and will be discharged back to group home.    10:00 AM -nursing staff went to discharge patient and she admitted that she swallowed the metal nose piece from her mask somewhere in the process of coming back from the GI lab.  There is not a metal nose piece currently in her mask.  Patient was given a new mask without a metal nose piece and will obtain x-ray to evaluate for intra-abdominal foreign body.  10:46 AM metal foreign body seen in stomach on plain film.  Case discussed with Dr. Mathew of GI.  Will add on for a second EGD today.  12:38 PM foreign body again removed.  Discharged back to group home.    At the conclusion of the encounter I discussed  the results of all of the tests and the disposition.   The questions were answered.  The patient or family acknowledged understanding and was agreeable with the care plan.       FINAL IMPRESSION        1. Swallowed foreign body, initial encounter             Lyubov Duke MD  08/13/21 0954       Lyubov Duke MD  08/13/21 1000       Lyubov Duke MD  08/13/21 1046       Lyubov Duke MD  08/13/21 6009

## 2021-08-13 NOTE — H&P
GENERAL PRE-PROCEDURE:   Procedure:  EGD - Foreign Body  Date/Time:  8/13/2021 11:34 AM    Verbal consent obtained?: Yes    Written consent obtained?: Yes    Risks and benefits: Risks, benefits and alternatives were discussed    Consent given by:  Patient  Patient states understanding of procedure being performed: Yes    Patient's understanding of procedure matches consent: Yes    Procedure consent matches procedure scheduled: Yes    Expected level of sedation:  Moderate  Appropriately NPO:  Yes  Mallampati  :  Grade 2- soft palate, base of uvula, tonsillar pillars, and portion of posterior pharyngeal wall visible  Lungs:  Lungs clear with good breath sounds bilaterally  Heart:  Normal heart sounds and rate  History & Physical reviewed:  History and physical reviewed and no updates needed  Statement of review:  I have reviewed the lab findings, diagnostic data, medications, and the plan for sedation

## 2021-08-13 NOTE — ED NOTES
Pt arrived back to ED around 940am.   PT was settled back into bed with water and blankets and is awaiting further instruction.   1-1 sitter watching via computer.  Pt needed to use house phone to cancel appt.

## 2021-08-13 NOTE — ED NOTES
Pt got CT scan with no issues. Provider ok for us not to start IV because Pt is a risk for trying to pull IV out and eat it.

## 2021-08-13 NOTE — ED TRIAGE NOTES
Pt states she swallowed some twist ties and a chicken bone at 1800, was seen at an urgency room had x-rays and was sent here

## 2021-08-13 NOTE — ED NOTES
Called the group home. At first they told me, I should contact the legal guardian regarding who will transport pt back to group home. Later, I called backed to let them know we arranging ride for the pt and to give report and no one answered, so I left voice mail. Will try to call again.

## 2021-08-14 DIAGNOSIS — Z71.89 OTHER SPECIFIED COUNSELING: ICD-10-CM

## 2021-08-15 ENCOUNTER — PATIENT OUTREACH (OUTPATIENT)
Dept: CARE COORDINATION | Facility: CLINIC | Age: 31
End: 2021-08-15

## 2021-08-15 NOTE — PROGRESS NOTES
Clinic Care Coordination Contact    Background: Care Coordination referral placed from [facility] discharge report for reason of patient meeting criteria for a TCM outreach call by [facility] team.    Assessment: Upon chart review, [facility] Team member will cancel/close the referral for TCM outreach due to reason below:     Patient has discharged to another healthcare facility, skilled nursing facility or group home.     Plan: Care Coordination referral for TCM outreach canceled.      Lola Sultana RN  [facility], [facility]

## 2021-08-25 RX ORDER — METHYLCELLULOSE 2 G/19G
POWDER, FOR SOLUTION ORAL DAILY
Status: ON HOLD | COMMUNITY
End: 2022-02-16

## 2021-08-27 ASSESSMENT — MIFFLIN-ST. JEOR: SCORE: 1975.54

## 2021-08-30 ENCOUNTER — HOSPITAL ENCOUNTER (OUTPATIENT)
Facility: CLINIC | Age: 31
Discharge: HOME | End: 2021-08-30
Attending: INTERNAL MEDICINE | Admitting: INTERNAL MEDICINE
Payer: COMMERCIAL

## 2021-08-30 ENCOUNTER — ANESTHESIA EVENT (OUTPATIENT)
Dept: SURGERY | Facility: CLINIC | Age: 31
End: 2021-08-30
Payer: COMMERCIAL

## 2021-08-30 ENCOUNTER — ANESTHESIA (OUTPATIENT)
Dept: SURGERY | Facility: CLINIC | Age: 31
End: 2021-08-30
Payer: COMMERCIAL

## 2021-08-30 VITALS
HEIGHT: 62 IN | TEMPERATURE: 98.2 F | OXYGEN SATURATION: 97 % | SYSTOLIC BLOOD PRESSURE: 142 MMHG | BODY MASS INDEX: 52.17 KG/M2 | DIASTOLIC BLOOD PRESSURE: 88 MMHG | RESPIRATION RATE: 16 BRPM | HEART RATE: 87 BPM | WEIGHT: 283.5 LBS

## 2021-08-30 LAB — UPPER GI ENDOSCOPY: NORMAL

## 2021-08-30 PROCEDURE — 370N000017 HC ANESTHESIA TECHNICAL FEE, PER MIN: Performed by: INTERNAL MEDICINE

## 2021-08-30 PROCEDURE — 250N000009 HC RX 250: Performed by: NURSE ANESTHETIST, CERTIFIED REGISTERED

## 2021-08-30 PROCEDURE — 250N000011 HC RX IP 250 OP 636: Performed by: NURSE ANESTHETIST, CERTIFIED REGISTERED

## 2021-08-30 PROCEDURE — 360N000075 HC SURGERY LEVEL 2, PER MIN: Performed by: INTERNAL MEDICINE

## 2021-08-30 PROCEDURE — 999N000141 HC STATISTIC PRE-PROCEDURE NURSING ASSESSMENT: Performed by: INTERNAL MEDICINE

## 2021-08-30 PROCEDURE — C1726 CATH, BAL DIL, NON-VASCULAR: HCPCS | Performed by: INTERNAL MEDICINE

## 2021-08-30 PROCEDURE — 710N000012 HC RECOVERY PHASE 2, PER MINUTE: Performed by: INTERNAL MEDICINE

## 2021-08-30 PROCEDURE — 272N000001 HC OR GENERAL SUPPLY STERILE: Performed by: INTERNAL MEDICINE

## 2021-08-30 PROCEDURE — 258N000003 HC RX IP 258 OP 636: Performed by: NURSE ANESTHETIST, CERTIFIED REGISTERED

## 2021-08-30 RX ORDER — SODIUM CHLORIDE, SODIUM LACTATE, POTASSIUM CHLORIDE, CALCIUM CHLORIDE 600; 310; 30; 20 MG/100ML; MG/100ML; MG/100ML; MG/100ML
INJECTION, SOLUTION INTRAVENOUS CONTINUOUS PRN
Status: DISCONTINUED | OUTPATIENT
Start: 2021-08-30 | End: 2021-08-30

## 2021-08-30 RX ORDER — ONDANSETRON 4 MG/1
4 TABLET, ORALLY DISINTEGRATING ORAL EVERY 30 MIN PRN
Status: DISCONTINUED | OUTPATIENT
Start: 2021-08-30 | End: 2021-08-30 | Stop reason: HOSPADM

## 2021-08-30 RX ORDER — NALOXONE HYDROCHLORIDE 0.4 MG/ML
0.4 INJECTION, SOLUTION INTRAMUSCULAR; INTRAVENOUS; SUBCUTANEOUS
Status: DISCONTINUED | OUTPATIENT
Start: 2021-08-30 | End: 2021-08-30 | Stop reason: HOSPADM

## 2021-08-30 RX ORDER — OXYCODONE HYDROCHLORIDE 5 MG/1
5 TABLET ORAL EVERY 4 HOURS PRN
Status: DISCONTINUED | OUTPATIENT
Start: 2021-08-30 | End: 2021-08-30 | Stop reason: HOSPADM

## 2021-08-30 RX ORDER — PROPOFOL 10 MG/ML
INJECTION, EMULSION INTRAVENOUS PRN
Status: DISCONTINUED | OUTPATIENT
Start: 2021-08-30 | End: 2021-08-30

## 2021-08-30 RX ORDER — ONDANSETRON 2 MG/ML
4 INJECTION INTRAMUSCULAR; INTRAVENOUS
Status: DISCONTINUED | OUTPATIENT
Start: 2021-08-30 | End: 2021-08-30 | Stop reason: HOSPADM

## 2021-08-30 RX ORDER — ONDANSETRON 2 MG/ML
4 INJECTION INTRAMUSCULAR; INTRAVENOUS EVERY 6 HOURS PRN
Status: DISCONTINUED | OUTPATIENT
Start: 2021-08-30 | End: 2021-08-30 | Stop reason: HOSPADM

## 2021-08-30 RX ORDER — DIPHENHYDRAMINE HCL 25 MG
25 CAPSULE ORAL EVERY 6 HOURS PRN
Status: DISCONTINUED | OUTPATIENT
Start: 2021-08-30 | End: 2021-08-30 | Stop reason: HOSPADM

## 2021-08-30 RX ORDER — LABETALOL HYDROCHLORIDE 5 MG/ML
10 INJECTION, SOLUTION INTRAVENOUS
Status: DISCONTINUED | OUTPATIENT
Start: 2021-08-30 | End: 2021-08-30 | Stop reason: HOSPADM

## 2021-08-30 RX ORDER — SODIUM CHLORIDE, SODIUM LACTATE, POTASSIUM CHLORIDE, CALCIUM CHLORIDE 600; 310; 30; 20 MG/100ML; MG/100ML; MG/100ML; MG/100ML
INJECTION, SOLUTION INTRAVENOUS CONTINUOUS
Status: DISCONTINUED | OUTPATIENT
Start: 2021-08-30 | End: 2021-08-30 | Stop reason: HOSPADM

## 2021-08-30 RX ORDER — PROPOFOL 10 MG/ML
INJECTION, EMULSION INTRAVENOUS CONTINUOUS PRN
Status: DISCONTINUED | OUTPATIENT
Start: 2021-08-30 | End: 2021-08-30

## 2021-08-30 RX ORDER — ONDANSETRON 2 MG/ML
4 INJECTION INTRAMUSCULAR; INTRAVENOUS EVERY 30 MIN PRN
Status: DISCONTINUED | OUTPATIENT
Start: 2021-08-30 | End: 2021-08-30 | Stop reason: HOSPADM

## 2021-08-30 RX ORDER — PROCHLORPERAZINE MALEATE 10 MG
10 TABLET ORAL EVERY 6 HOURS PRN
Status: DISCONTINUED | OUTPATIENT
Start: 2021-08-30 | End: 2021-08-30 | Stop reason: HOSPADM

## 2021-08-30 RX ORDER — DIPHENHYDRAMINE HYDROCHLORIDE 50 MG/ML
25 INJECTION INTRAMUSCULAR; INTRAVENOUS EVERY 6 HOURS PRN
Status: DISCONTINUED | OUTPATIENT
Start: 2021-08-30 | End: 2021-08-30 | Stop reason: HOSPADM

## 2021-08-30 RX ORDER — FENTANYL CITRATE 50 UG/ML
25 INJECTION, SOLUTION INTRAMUSCULAR; INTRAVENOUS EVERY 5 MIN PRN
Status: DISCONTINUED | OUTPATIENT
Start: 2021-08-30 | End: 2021-08-30 | Stop reason: HOSPADM

## 2021-08-30 RX ORDER — HYDROMORPHONE HCL IN WATER/PF 6 MG/30 ML
.2-.4 PATIENT CONTROLLED ANALGESIA SYRINGE INTRAVENOUS EVERY 5 MIN PRN
Status: DISCONTINUED | OUTPATIENT
Start: 2021-08-30 | End: 2021-08-30 | Stop reason: HOSPADM

## 2021-08-30 RX ORDER — GLYCOPYRROLATE 0.2 MG/ML
INJECTION, SOLUTION INTRAMUSCULAR; INTRAVENOUS PRN
Status: DISCONTINUED | OUTPATIENT
Start: 2021-08-30 | End: 2021-08-30

## 2021-08-30 RX ORDER — FENTANYL CITRATE 50 UG/ML
25-50 INJECTION, SOLUTION INTRAMUSCULAR; INTRAVENOUS EVERY 5 MIN PRN
Status: DISCONTINUED | OUTPATIENT
Start: 2021-08-30 | End: 2021-08-30 | Stop reason: HOSPADM

## 2021-08-30 RX ORDER — FLUMAZENIL 0.1 MG/ML
0.2 INJECTION, SOLUTION INTRAVENOUS
Status: DISCONTINUED | OUTPATIENT
Start: 2021-08-30 | End: 2021-08-30 | Stop reason: HOSPADM

## 2021-08-30 RX ORDER — MEPERIDINE HYDROCHLORIDE 25 MG/ML
12.5 INJECTION INTRAMUSCULAR; INTRAVENOUS; SUBCUTANEOUS EVERY 5 MIN PRN
Status: DISCONTINUED | OUTPATIENT
Start: 2021-08-30 | End: 2021-08-30 | Stop reason: HOSPADM

## 2021-08-30 RX ORDER — NALOXONE HYDROCHLORIDE 0.4 MG/ML
0.2 INJECTION, SOLUTION INTRAMUSCULAR; INTRAVENOUS; SUBCUTANEOUS
Status: DISCONTINUED | OUTPATIENT
Start: 2021-08-30 | End: 2021-08-30 | Stop reason: HOSPADM

## 2021-08-30 RX ORDER — HALOPERIDOL 5 MG/ML
1 INJECTION INTRAMUSCULAR
Status: DISCONTINUED | OUTPATIENT
Start: 2021-08-30 | End: 2021-08-30 | Stop reason: HOSPADM

## 2021-08-30 RX ORDER — LIDOCAINE 40 MG/G
CREAM TOPICAL
Status: DISCONTINUED | OUTPATIENT
Start: 2021-08-30 | End: 2021-08-30 | Stop reason: HOSPADM

## 2021-08-30 RX ORDER — ONDANSETRON 4 MG/1
4 TABLET, ORALLY DISINTEGRATING ORAL EVERY 6 HOURS PRN
Status: DISCONTINUED | OUTPATIENT
Start: 2021-08-30 | End: 2021-08-30 | Stop reason: HOSPADM

## 2021-08-30 RX ADMIN — PROPOFOL 150 MCG/KG/MIN: 10 INJECTION, EMULSION INTRAVENOUS at 10:22

## 2021-08-30 RX ADMIN — GLYCOPYRROLATE 0.1 MG: 0.2 INJECTION, SOLUTION INTRAMUSCULAR; INTRAVENOUS at 10:16

## 2021-08-30 RX ADMIN — MIDAZOLAM 2 MG: 1 INJECTION INTRAMUSCULAR; INTRAVENOUS at 10:16

## 2021-08-30 RX ADMIN — PROPOFOL 70 MG: 10 INJECTION, EMULSION INTRAVENOUS at 10:26

## 2021-08-30 RX ADMIN — SODIUM CHLORIDE, POTASSIUM CHLORIDE, SODIUM LACTATE AND CALCIUM CHLORIDE: 600; 310; 30; 20 INJECTION, SOLUTION INTRAVENOUS at 10:16

## 2021-08-30 ASSESSMENT — MIFFLIN-ST. JEOR: SCORE: 1964.2

## 2021-08-30 NOTE — ANESTHESIA CARE TRANSFER NOTE
Patient: Nevin Alvarado    Procedure(s):  ESOPHAGOGASTRODUODENOSCOPY, WITH DILATION (mngi)    Diagnosis: Dysphagia [R13.10]  Diagnosis Additional Information: No value filed.    Anesthesia Type:   MAC     Note:    Oropharynx: oropharynx clear of all foreign objects  Level of Consciousness: awake  Oxygen Supplementation: nasal cannula  Level of Supplemental Oxygen (L/min / FiO2): 2  Independent Airway: airway patency satisfactory and stable  Dentition: dentition unchanged  Vital Signs Stable: post-procedure vital signs reviewed and stable  Report to RN Given: handoff report given  Patient transferred to: Phase II    Handoff Report: Identifed the Patient, Identified the Reponsible Provider, Reviewed the pertinent medical history, Discussed the surgical course, Reviewed Intra-OP anesthesia mangement and issues during anesthesia, Set expectations for post-procedure period and Allowed opportunity for questions and acknowledgement of understanding      Vitals:  Vitals Value Taken Time   BP     Temp     Pulse     Resp     SpO2         Electronically Signed By: HU Menjivar CRNA  August 30, 2021  10:42 AM

## 2021-08-30 NOTE — DISCHARGE INSTRUCTIONS
SEDATION ADULT DISCHARGE INSTRUCTIONS   SPECIAL PRECAUTIONS FOR 24 HOURS AFTER SURGERY    IT IS NOT UNUSUAL TO FEEL LIGHT-HEADED OR FAINT, UP TO 24 HOURS AFTER SURGERY OR WHILE TAKING PAIN MEDICATION.  IF YOU HAVE THESE SYMPTOMS; SIT FOR A FEW MINUTES BEFORE STANDING AND HAVE SOMEONE ASSIST YOU WHEN YOU GET UP TO WALK OR USE THE BATHROOM.    YOU SHOULD REST AND RELAX FOR THE NEXT 24 HOURS AND YOU MUST MAKE ARRANGEMENTS TO HAVE SOMEONE STAY WITH YOU FOR AT LEAST 24 HOURS AFTER YOUR DISCHARGE.  AVOID HAZARDOUS AND STRENUOUS ACTIVITIES.  DO NOT MAKE IMPORTANT DECISIONS FOR 24 HOURS.    DO NOT DRIVE ANY VEHICLE OR OPERATE MECHANICAL EQUIPMENT FOR 24 HOURS FOLLOWING THE END OF YOUR SURGERY.  EVEN THOUGH YOU MAY FEEL NORMAL, YOUR REACTIONS MAY BE AFFECTED BY THE MEDICATION YOU HAVE RECEIVED.    DO NOT DRINK ALCOHOLIC BEVERAGES FOR 24 HOURS FOLLOWING YOUR SURGERY.    DRINK CLEAR LIQUIDS (APPLE JUICE, GINGER ALE, 7-UP, BROTH, ETC.).  PROGRESS TO YOUR REGULAR DIET AS YOU FEEL ABLE.    YOU MAY HAVE A DRY MOUTH, A SORE THROAT, MUSCLES ACHES OR TROUBLE SLEEPING.  THESE SHOULD GO AWAY AFTER 24 HOURS.    CALL YOUR DOCTOR FOR ANY OF THE FOLLOWING:  SIGNS OF INFECTION (FEVER, GROWING TENDERNESS AT THE SURGERY SITE, A LARGE AMOUNT OF DRAINAGE OR BLEEDING, SEVERE PAIN, FOUL-SMELLING DRAINAGE, REDNESS OR SWELLING.    IT HAS BEEN OVER 8 TO 10 HOURS SINCE SURGERY AND YOU ARE STILL NOT ABLE TO URINATE (PASS WATER).     ESOPHAGOGASTRODUODENOSCOPY DISCHARGE INSTRUCTIONS    You may not drive, use heavy equipment or consume alcohol for 24 hours because the drugs you were given may cause dizziness, drowsiness, forgetfulness and slower reaction time.    Small pieces or tissue (biopsies) or polyps may have been removed.    You may resume your regular diet and medications. Exception: If you had a biopsy or polypectomy, do not take aspirin, aleve (naproxen) or ibuprofen for the next 10 days.  Tylenol (acetaminophen) is safe to  take.    Additional instructions:  If you had a biopsy or polypectomy, the pathology report will be sent to your doctor.  If you have not received the results within 10 days, call your doctor's office.    What to watch for:  Problems rarely occur after the procedure.  It is important for you to be aware of the early signs of a possible complication.  Call immediately if you notice any of the followin.  Unusual pain or difficulty swallowing.    2.  Unusual abdominal or chest pain.    3.  Vomiting of blood.    4.  Black or bloody stools.    5.  Temperature above 100.6 degrees F      DR. CARLOS GOMEZ M.D.      CLINIC PHONE NUMBER:  [phone]  [facility]
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
## 2021-08-30 NOTE — ANESTHESIA POSTPROCEDURE EVALUATION
Patient: Nevin Alvarado    Procedure(s):  ESOPHAGOGASTRODUODENOSCOPY, WITH DILATION (mngi)    Diagnosis:Dysphagia [R13.10]  Diagnosis Additional Information: No value filed.    Anesthesia Type:  MAC    Note:  Disposition: Outpatient   Postop Pain Control: Uneventful            Sign Out: Well controlled pain   PONV: No   Neuro/Psych: Uneventful            Sign Out: Acceptable/Baseline neuro status   Airway/Respiratory: Uneventful            Sign Out: Acceptable/Baseline resp. status   CV/Hemodynamics: Uneventful            Sign Out: Acceptable CV status; No obvious hypovolemia; No obvious fluid overload   Other NRE: NONE   DID A NON-ROUTINE EVENT OCCUR? No           Last vitals:  Vitals Value Taken Time   /88 08/30/21 1103   Temp 98.2  F (36.8  C) 08/30/21 1037   Pulse 87 08/30/21 1103   Resp 16 08/30/21 1103   SpO2 97 % 08/30/21 1103       Electronically Signed By: Niru Bates MD  August 30, 2021  11:12 AM

## 2021-09-11 ENCOUNTER — HEALTH MAINTENANCE LETTER (OUTPATIENT)
Age: 31
End: 2021-09-11

## 2021-09-23 NOTE — ANESTHESIA CARE TRANSFER NOTE
Last vitals:   Vitals:    10/03/19 2157   BP: (!) 159/92   Pulse: 90   Resp: 20   Temp: 36.7  C (98  F)   SpO2: 99%     Patient's level of consciousness is awake and drowsy  Spontaneous respirations: yes  Maintains airway independently: yes  Dentition unchanged: yes  Oropharynx: oropharynx clear of all foreign objects    QCDR Measures:  ASA# 20 - Surgical Safety Checklist: WHO surgical safety checklist completed prior to induction    PQRS# 430 - Adult PONV Prevention: 4558F - Pt received => 2 anti-emetic agents (different classes) preop & intraop  ASA# 8 - Peds PONV Prevention: NA - Not pediatric patient, not GA or 2 or more risk factors NOT present  PQRS# 424 - Tami-op Temp Management: 4559F - At least one body temp DOCUMENTED => 35.5C or 95.9F within required timeframe  PQRS# 426 - PACU Transfer Protocol:transfer checklist used   ASA# 14 - Acute Post-op Pain: ASA14B - Patient did NOT experience pain >= 7 out of 10  
occasional use

## 2021-10-25 NOTE — TELEPHONE ENCOUNTER
Please call patient for hospital follow up.  She is no longer under Dr. Ramos's care, however.   What Is The Reason For Today's Visit?: Excessive sun exposure

## 2022-01-14 ENCOUNTER — HOSPITAL ENCOUNTER (EMERGENCY)
Facility: HOSPITAL | Age: 32
Discharge: HOME | End: 2022-01-14
Attending: EMERGENCY MEDICINE | Admitting: EMERGENCY MEDICINE
Payer: COMMERCIAL

## 2022-01-14 ENCOUNTER — APPOINTMENT (OUTPATIENT)
Dept: RADIOLOGY | Facility: HOSPITAL | Age: 32
End: 2022-01-14
Attending: EMERGENCY MEDICINE
Payer: COMMERCIAL

## 2022-01-14 VITALS
OXYGEN SATURATION: 95 % | DIASTOLIC BLOOD PRESSURE: 88 MMHG | HEART RATE: 99 BPM | TEMPERATURE: 97 F | RESPIRATION RATE: 16 BRPM | SYSTOLIC BLOOD PRESSURE: 145 MMHG

## 2022-01-14 DIAGNOSIS — J45.21 MILD INTERMITTENT ASTHMA WITH EXACERBATION: ICD-10-CM

## 2022-01-14 DIAGNOSIS — J06.9 VIRAL URI WITH COUGH: ICD-10-CM

## 2022-01-14 PROCEDURE — 99283 EMERGENCY DEPT VISIT LOW MDM: CPT | Mod: 25

## 2022-01-14 PROCEDURE — 71046 X-RAY EXAM CHEST 2 VIEWS: CPT

## 2022-01-14 PROCEDURE — 99284 EMERGENCY DEPT VISIT MOD MDM: CPT | Mod: 25

## 2022-01-14 RX ORDER — ALBUTEROL SULFATE 0.83 MG/ML
2.5 SOLUTION RESPIRATORY (INHALATION) EVERY 4 HOURS PRN
Qty: 3 ML | Refills: 0 | Status: ON HOLD | OUTPATIENT
Start: 2022-01-14 | End: 2022-02-16

## 2022-01-14 RX ORDER — ALBUTEROL SULFATE 90 UG/1
2 AEROSOL, METERED RESPIRATORY (INHALATION) ONCE
Status: DISCONTINUED | OUTPATIENT
Start: 2022-01-14 | End: 2022-01-14 | Stop reason: HOSPADM

## 2022-01-14 RX ORDER — PREDNISONE 20 MG/1
TABLET ORAL
Qty: 10 TABLET | Refills: 0 | Status: SHIPPED | OUTPATIENT
Start: 2022-01-14 | End: 2022-02-09

## 2022-01-14 RX ORDER — ALBUTEROL SULFATE 5 MG/ML
2.5 SOLUTION RESPIRATORY (INHALATION) ONCE
Status: DISCONTINUED | OUTPATIENT
Start: 2022-01-14 | End: 2022-01-14

## 2022-01-14 RX ORDER — ALBUTEROL SULFATE 90 UG/1
2 AEROSOL, METERED RESPIRATORY (INHALATION) EVERY 6 HOURS PRN
Qty: 18 G | Refills: 0 | Status: SHIPPED | OUTPATIENT
Start: 2022-01-14

## 2022-01-14 RX ORDER — IPRATROPIUM BROMIDE AND ALBUTEROL SULFATE 2.5; .5 MG/3ML; MG/3ML
3 SOLUTION RESPIRATORY (INHALATION) ONCE
Status: DISCONTINUED | OUTPATIENT
Start: 2022-01-14 | End: 2022-01-14

## 2022-01-14 RX ORDER — PREDNISONE 20 MG/1
40 TABLET ORAL ONCE
Status: DISCONTINUED | OUTPATIENT
Start: 2022-01-14 | End: 2022-01-14 | Stop reason: HOSPADM

## 2022-01-14 ASSESSMENT — ENCOUNTER SYMPTOMS
VOMITING: 0
SHORTNESS OF BREATH: 1
COUGH: 1

## 2022-01-15 NOTE — DISCHARGE INSTRUCTIONS
Albuterol inhaler every 2-4 hours as needed for shortness of breath or wheezing.  Alternatively albuterol 3 mL or 2.5 mg nebulization solution and nebulizer machine every 2-4 hours as needed for shortness of breath or wheezing.  Prednisone as prescribed.  See your clinic for follow-up within the week.

## 2022-01-15 NOTE — ED PROVIDER NOTES
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     Financial Resource Strain: Not on file   Food Insecurity: Not on file   Transportation Needs: Not on file   Physical Activity: Not on file   Stress: Not on file   Social Connections: Not on file   Intimate Partner Violence: Not on file   Housing Stability: Not on file   No drugs, alcohol, tobacco.    VITALS:  BP (!) 162/95   Pulse 115   Temp 97  F (36.1  C) (Temporal)   Resp 22   SpO2 96%     PHYSICAL EXAM    Vital Signs:  BP (!) 162/95   Pulse 115   Temp 97  F (36.1  C) (Temporal)   Resp 22   SpO2 96%   General:  On entering the room she is in no apparent distress.  Morbid obesity is noted.  Expiratory  wheezing is noted.  A dry cough is also noted.  Neck:  Neck supple with full range of motion and nontender.    Back:  Back and spine are nontender.  No costovertebral angle tenderness.    HEENT:  Oropharynx clear with moist mucous membranes.  HEENT unremarkable.    Pulmonary:  Chest clear to auscultation without rhonchi rales.  Expiratory wheezing is noted.  Cardiovascular:  Cardiac slightly fast at 105 regular rate and rhythm without murmurs rubs or gallops.    Abdomen:  Abdomen soft nontender.  There is no rebound or guarding.    Muskuloskeletal:  she moves all 4 without any difficulty and has normal neurovascular exams.  Extremities without clubbing, cyanosis, or edema.  Legs and calves are nontender.    Neuro:  she is alert and oriented ×3 and moves all extremities symmetrically.    Psych:  Normal affect.    Skin:  Unremarkable and warm and dry.       LAB:  All pertinent labs reviewed and interpreted.  Labs Ordered and Resulted from Time of ED Arrival to Time of ED Departure - No data to display    RADIOLOGY:  Reviewed all pertinent imaging. Please see official radiology report.  Chest XR,  PA & LAT   Final Result   IMPRESSION: Mild elevation of right hemidiaphragm. Minimal scoliosis. Otherwise negative chest.                 EKG:          PROCEDURES:   None      I, Lynn Pyle, am serving as a scribe to document services personally performed by Dr. Saucedo based on my observation and the provider's statements to me. I, Nish Saucedo MD attest that Lynn Pyle is acting in a scribe capacity, has observed my performance of the services and has documented them in accordance with my direction.    Nish Saucedo M.D.  Emergency Medicine  [facility] EMERGENCY DEPARTMENT  [facility] [facility] [id number] [phone]  Dept: [phone]     Nish Saucedo MD  01/14/22 1955

## 2022-01-15 NOTE — ED TRIAGE NOTES
Patient has covid sx per her report. She feels SOB and loss of taste and smell. States she has been sick for four days. She has had two negative COVID tests the last two days.

## 2022-01-17 ENCOUNTER — HOSPITAL ENCOUNTER (EMERGENCY)
Facility: HOSPITAL | Age: 32
Discharge: HOME | End: 2022-01-17
Attending: EMERGENCY MEDICINE | Admitting: EMERGENCY MEDICINE
Payer: COMMERCIAL

## 2022-01-17 VITALS
HEART RATE: 86 BPM | DIASTOLIC BLOOD PRESSURE: 97 MMHG | TEMPERATURE: 98 F | OXYGEN SATURATION: 99 % | SYSTOLIC BLOOD PRESSURE: 140 MMHG | RESPIRATION RATE: 16 BRPM

## 2022-01-17 DIAGNOSIS — R07.89 CHEST WALL PAIN: ICD-10-CM

## 2022-01-17 DIAGNOSIS — R05.9 COUGH: ICD-10-CM

## 2022-01-17 LAB
FLUAV RNA SPEC QL NAA+PROBE: NEGATIVE
FLUBV RNA RESP QL NAA+PROBE: NEGATIVE
SARS-COV-2 RNA RESP QL NAA+PROBE: NEGATIVE

## 2022-01-17 PROCEDURE — 87636 SARSCOV2 & INF A&B AMP PRB: CPT | Performed by: EMERGENCY MEDICINE

## 2022-01-17 PROCEDURE — C9803 HOPD COVID-19 SPEC COLLECT: HCPCS

## 2022-01-17 PROCEDURE — 99283 EMERGENCY DEPT VISIT LOW MDM: CPT

## 2022-01-17 PROCEDURE — 250N000013 HC RX MED GY IP 250 OP 250 PS 637: Performed by: EMERGENCY MEDICINE

## 2022-01-17 RX ORDER — DOXYCYCLINE 100 MG/1
100 CAPSULE ORAL EVERY 12 HOURS SCHEDULED
Status: DISCONTINUED | OUTPATIENT
Start: 2022-01-17 | End: 2022-01-17 | Stop reason: HOSPADM

## 2022-01-17 RX ORDER — NAPROXEN SODIUM 220 MG
220 TABLET ORAL ONCE
Status: DISCONTINUED | OUTPATIENT
Start: 2022-01-17 | End: 2022-01-17 | Stop reason: RX

## 2022-01-17 RX ORDER — BENZONATATE 100 MG/1
100 CAPSULE ORAL ONCE
Status: COMPLETED | OUTPATIENT
Start: 2022-01-17 | End: 2022-01-17

## 2022-01-17 RX ORDER — DOXYCYCLINE 100 MG/1
100 CAPSULE ORAL 2 TIMES DAILY
Qty: 14 CAPSULE | Refills: 0 | Status: SHIPPED | OUTPATIENT
Start: 2022-01-17 | End: 2022-01-24

## 2022-01-17 RX ORDER — NAPROXEN 500 MG/1
500 TABLET ORAL 2 TIMES DAILY WITH MEALS
Qty: 24 TABLET | Refills: 0 | Status: SHIPPED | OUTPATIENT
Start: 2022-01-17 | End: 2022-01-25

## 2022-01-17 RX ORDER — NAPROXEN 250 MG/1
250 TABLET ORAL ONCE
Status: DISCONTINUED | OUTPATIENT
Start: 2022-01-17 | End: 2022-01-17 | Stop reason: HOSPADM

## 2022-01-17 RX ADMIN — BENZONATATE 100 MG: 100 CAPSULE ORAL at 02:36

## 2022-01-17 NOTE — Clinical Note
Nevin Alvarado was seen and treated in our emergency department on 1/17/2022.  She may return to work on 01/19/2022.       If you have any questions or concerns, please don't hesitate to call.      Zainab Evans, DO

## 2022-01-17 NOTE — ED PROVIDER NOTES
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 CAPS Take 2,000 Units by mouth daily     Unknown, Entered By History   cloNIDine (CATAPRES) 0.1 MG tablet Take 0.1 mg by mouth 2 times daily     Reported, Patient   desvenlafaxine (PRISTIQ) 100 MG 24 hr tablet Take 1 tablet (100 mg) by mouth every morning    Reported, Patient   hydroxychloroquine (PLAQUENIL) 200 MG tablet Take 200 mg by mouth 2 times daily    Reported, Patient   lurasidone (LATUDA) 60 MG TABS tablet Take 60 mg by mouth daily  (with dinner)  12/4/18   Reported, Patient   metFORMIN (GLUCOPHAGE-XR) 500 MG 24 hr tablet Take 1,000 mg by mouth daily (with dinner)     Unknown, Entered By History   methylcellulose (CITRUCEL) powder Take by mouth daily    Reported, Patient   ondansetron (ZOFRAN-ODT) 4 MG ODT tab Take 4 mg by mouth every 8 hours as needed for nausea    Unknown, Entered By History   predniSONE (DELTASONE) 20 MG tablet Take two tablets (= 40mg) each day for 5 (five) days 1/14/22   Nish Saucedo MD   pregabalin (LYRICA) 100 MG capsule Take 100 mg by mouth 3 times daily     Reported, Patient   Respiratory Therapy Supplies (NEBULIZER) BRENDAN Nebulizer device.  Albuterol nebulization every 2 hours as needed for shortness of breath or wheezing. 1/14/22   Nish Saucedo MD   valACYclovir (VALTREX) 1000 mg tablet Take 2 tablets by mouth two times daily for one day. Use as needed at onset of cold sore.     Unknown, Entered By History         ALLERGIES:  Allergies   Allergen Reactions     Amoxicillin-Pot Clavulanate Other (See Comments), Swelling and Rash     PN: facial swelling, left side. Also had cortisone injection the same day as she started the Augmentin.  Noted in downtime recovery of chart.    PN: facial swelling, left side. Also had cortisone injection the same day as she started the Augmentin.; HUT Comment: PN: facial swelling, left side. Also had cortisone injection the same day as she started the Augmentin.Noted in downtime recovery of chart.; HUT Reaction: Rash; HUT Reaction: Unknown; HUT Reaction Type: Allergy; HUT Severity: Med; HUT Noted: [id number]     Oseltamivir Hives     med stopped, PN: med stopped  med stopped, PN: med stopped; HUT Comment: med stopped, PN: med stopped; HUT Reaction: Hives; HUT Reaction Type: Allergy; HUT Severity: Med; HUT Noted: [id number]     Penicillins Anaphylaxis     HUT Reaction: Anaphylaxis; HUT Reaction Type: Allergy; HUT Severity: High; HUT Noted: [id number]     Vancomycin Itching, Swelling and  Rash     Other reaction(s): Redness  Flushed face, very itchy; HUT Comment: Flushed face, very itchy; HUT Reaction: Angioedema; HUT Reaction: Redness; HUT Severity: Med; HUT Noted: [id number]    facial     Hydrocodone Nausea and Vomiting and GI Disturbance     vomiting for days, PN: vomiting for days; HUT Comment: vomiting for days; HUT Reaction: Gastrointestinal; HUT Reaction: Nausea And Vomiting; HUT Reaction Type: Intolerance; HUT Severity: Med; HUT Noted: [id number]  vomiting for days       Adhesive Tape      Silicone type     Blood-Group Specific Substance Other (See Comments)     Patient has an anti-Cw and non-specific antibodies. Blood product orders may be delayed. Draw one red top and two purple top tubes for all type/screen/crossmatch orders.  Patient has anti-Cw, anti-K ([place]), Warm auto and nonspecific antibodies. Blood products may be delayed. Draw patient 24 hours prior to transfusion. Draw one red top and two purple top tubes for all type and screen orders.     Naltrexone Other (See Comments)     Reaction(s): Vivid dreams.  Reaction(s): Vivid dreams.       Oxycodone Swelling     Cephalosporins Rash     Influenza Vaccines Other (See Comments) and Nausea and Vomiting     HUT Reaction: Nausea And Vomiting; HUT Reaction Type: Intolerance; HUT Severity: Low; HUT Noted: [id number]     Lamotrigine Rash     Possibly associated with Lamictal.   HUT Comment: Possibly associated with Lamictal. ; HUT Reaction: Rash; HUT Reaction Type: Allergy; HUT Severity: Low; HUT Noted: [id number]     Latex Rash     HUT Reaction: Rash; HUT Reaction Type: Allergy; HUT Severity: Low; HUT Noted: [id number]       FAMILY HISTORY:  Family History   Problem Relation Age of Onset     Diabetes Type 2  Maternal Grandmother      Diabetes Type 2  Paternal Grandmother      Breast Cancer Paternal Grandmother      Cerebrovascular Disease Father 53     Myocardial Infarction No family hx of      Coronary Artery Disease Early Onset No family hx of       Ovarian Cancer No family hx of      Colon Cancer No family hx of      Depression Mother      Anxiety Disorder Mother        SOCIAL HISTORY:   Social History     Socioeconomic History     Marital status: Single     Spouse name: Not on file     Number of children: Not on file     Years of education: Not on file     Highest education level: Not on file   Occupational History     Occupation: On disability   Tobacco Use     Smoking status: Never Smoker     Smokeless tobacco: Never Used   Vaping Use     Vaping Use: Not on file   Substance and Sexual Activity     Alcohol use: No     Alcohol/week: 0.0 standard drinks     Drug use: No     Sexual activity: Not Currently     Partners: Male     Birth control/protection: I.U.D.     Comment: IUD - Mirena - placed July, 2015   Other Topics Concern     Parent/sibling w/ CABG, MI or angioplasty before 65F 55M? Not Asked   Social History Narrative    Single.    Living in independent living portion of [employer].    On disability.    No regular exercise.      Social Determinants of Health     Financial Resource Strain: Not on file   Food Insecurity: Not on file   Transportation Needs: Not on file   Physical Activity: Not on file   Stress: Not on file   Social Connections: Not on file   Intimate Partner Violence: Not on file   Housing Stability: Not on file       PHYSICAL EXAM   BP (!) 140/97   Pulse 86   Temp 98  F (36.7  C) (Oral)   Resp 16   LMP 12/27/2021   SpO2 99%   General presentation: Alert, Vital signs reviewed. NAD  HENT: ENT inspection is normal. Oropharynx is moist and clear.   Eye: Pupils are equal and reactive to light. EOMI  Neck: The neck is supple, with full ROM, with no evidence of meningismus.  Pulmonary: Currently in no acute respiratory distress. Very faint expiratory wheezing noted bilateral. Mild crackles in the right base.   Circulatory: Regular rate and rhythm. Peripheral pulses are strong and equal. No murmurs, rubs, or gallops.   Abdominal: The  abdomen is soft. Nontender. No rigidity, guarding, or rebound. Bowel sounds normal.   Neurologic: Alert, oriented to person, place, and time. No motor deficit. No sensory deficit. Cranial nerves II through XII are intact.  Musculoskeletal: No extremity tenderness. Full range of motion in all extremities. No extremity edema.   Skin: Skin color is normal. No rash. Warm. Dry to touch.        LAB:  All pertinent labs reviewed and interpreted.  Labs Ordered and Resulted from Time of ED Arrival to Time of ED Departure   INFLUENZA A/B & SARS-COV2 PCR MULTIPLEX - Normal       Result Value    Influenza A PCR Negative      Influenza B PCR Negative      SARS CoV2 PCR Negative         RADIOLOGY:  Reviewed all pertinent imaging. Please see official radiology reports  No orders to display           IJeffrey, am serving as a scribe to document services personally performed by Dr. Zainab Evans based on my observation and the provider's statements to me. Zainab MURRAY, DO attest that Jeffrey Bunch is acting in a scribe capacity, has observed my performance of the services and has documented them in accordance with my direction.    Zainab Evans D.O.  Emergency Medicine  [facility] EMERGENCY DEPARTMENT  [facility] [facility] [id number] [phone]  Dept: [phone]       Zainab Evans DO  01/17/22 0723

## 2022-01-17 NOTE — DISCHARGE INSTRUCTIONS
Take the antibiotic doxycycline as directed for the next 7 days to treat a possible bacterial pneumonia.  Use the naproxen as needed for any further chest wall discomfort.  Continue taking the prednisone as directed.  Your chest discomfort is likely due to a strain of your chest wall musculature.  Use the prescribed naproxen as needed for any further pain.  Please follow-up with your primary care physician for reevaluation or return back to ED sooner for any worsening shortness of breath, chest pain, or any other new or concerning symptoms

## 2022-01-17 NOTE — ED TRIAGE NOTES
Pt presents to ED with c/o coughing and SOB that has been going on for about a week, pt states she was dx with a URI and given nebs and steroids that are not working, pt has CP when she coughs, states she has had fevers at home, pt is a/ox4 in no acute distress

## 2022-01-24 ENCOUNTER — APPOINTMENT (OUTPATIENT)
Dept: CT IMAGING | Facility: CLINIC | Age: 32
End: 2022-01-24
Attending: EMERGENCY MEDICINE
Payer: COMMERCIAL

## 2022-01-24 ENCOUNTER — HOSPITAL ENCOUNTER (EMERGENCY)
Facility: CLINIC | Age: 32
Discharge: HOME | End: 2022-01-24
Attending: EMERGENCY MEDICINE | Admitting: EMERGENCY MEDICINE
Payer: COMMERCIAL

## 2022-01-24 VITALS
HEART RATE: 100 BPM | OXYGEN SATURATION: 96 % | DIASTOLIC BLOOD PRESSURE: 64 MMHG | WEIGHT: 287 LBS | HEIGHT: 62 IN | BODY MASS INDEX: 52.81 KG/M2 | RESPIRATION RATE: 18 BRPM | SYSTOLIC BLOOD PRESSURE: 146 MMHG

## 2022-01-24 DIAGNOSIS — R55 SYNCOPE, UNSPECIFIED SYNCOPE TYPE: ICD-10-CM

## 2022-01-24 LAB
ANION GAP SERPL CALCULATED.3IONS-SCNC: 8 MMOL/L (ref 5–18)
BASOPHILS # BLD AUTO: 0 10E3/UL (ref 0–0.2)
BASOPHILS NFR BLD AUTO: 0 %
BUN SERPL-MCNC: 14 MG/DL (ref 8–22)
CALCIUM SERPL-MCNC: 9.5 MG/DL (ref 8.5–10.5)
CHLORIDE BLD-SCNC: 102 MMOL/L (ref 98–107)
CO2 SERPL-SCNC: 28 MMOL/L (ref 22–31)
CREAT SERPL-MCNC: 0.64 MG/DL (ref 0.6–1.1)
EOSINOPHIL # BLD AUTO: 0.3 10E3/UL (ref 0–0.7)
EOSINOPHIL NFR BLD AUTO: 3 %
ERYTHROCYTE [DISTWIDTH] IN BLOOD BY AUTOMATED COUNT: 13.6 % (ref 10–15)
FLUAV RNA SPEC QL NAA+PROBE: NEGATIVE
FLUBV RNA RESP QL NAA+PROBE: NEGATIVE
GFR SERPL CREATININE-BSD FRML MDRD: >90 ML/MIN/1.73M2
GLUCOSE BLD-MCNC: 113 MG/DL (ref 70–125)
HCG SERPL QL: NEGATIVE
HCT VFR BLD AUTO: 40.9 % (ref 35–47)
HGB BLD-MCNC: 13.1 G/DL (ref 11.7–15.7)
IMM GRANULOCYTES # BLD: 0 10E3/UL
IMM GRANULOCYTES NFR BLD: 0 %
LYMPHOCYTES # BLD AUTO: 1.7 10E3/UL (ref 0.8–5.3)
LYMPHOCYTES NFR BLD AUTO: 16 %
MCH RBC QN AUTO: 28.2 PG (ref 26.5–33)
MCHC RBC AUTO-ENTMCNC: 32 G/DL (ref 31.5–36.5)
MCV RBC AUTO: 88 FL (ref 78–100)
MONOCYTES # BLD AUTO: 0.5 10E3/UL (ref 0–1.3)
MONOCYTES NFR BLD AUTO: 5 %
NEUTROPHILS # BLD AUTO: 8.1 10E3/UL (ref 1.6–8.3)
NEUTROPHILS NFR BLD AUTO: 76 %
NRBC # BLD AUTO: 0 10E3/UL
NRBC BLD AUTO-RTO: 0 /100
PLATELET # BLD AUTO: 258 10E3/UL (ref 150–450)
POTASSIUM BLD-SCNC: 4 MMOL/L (ref 3.5–5)
RBC # BLD AUTO: 4.64 10E6/UL (ref 3.8–5.2)
SARS-COV-2 RNA RESP QL NAA+PROBE: NEGATIVE
SODIUM SERPL-SCNC: 138 MMOL/L (ref 136–145)
TROPONIN I SERPL-MCNC: <0.01 NG/ML (ref 0–0.29)
WBC # BLD AUTO: 10.6 10E3/UL (ref 4–11)

## 2022-01-24 PROCEDURE — 82310 ASSAY OF CALCIUM: CPT | Performed by: EMERGENCY MEDICINE

## 2022-01-24 PROCEDURE — 93005 ELECTROCARDIOGRAM TRACING: CPT | Performed by: EMERGENCY MEDICINE

## 2022-01-24 PROCEDURE — 71275 CT ANGIOGRAPHY CHEST: CPT

## 2022-01-24 PROCEDURE — C9803 HOPD COVID-19 SPEC COLLECT: HCPCS

## 2022-01-24 PROCEDURE — 36415 COLL VENOUS BLD VENIPUNCTURE: CPT | Performed by: EMERGENCY MEDICINE

## 2022-01-24 PROCEDURE — 250N000011 HC RX IP 250 OP 636: Performed by: EMERGENCY MEDICINE

## 2022-01-24 PROCEDURE — 87636 SARSCOV2 & INF A&B AMP PRB: CPT | Performed by: EMERGENCY MEDICINE

## 2022-01-24 PROCEDURE — 250N000013 HC RX MED GY IP 250 OP 250 PS 637: Performed by: EMERGENCY MEDICINE

## 2022-01-24 PROCEDURE — 99285 EMERGENCY DEPT VISIT HI MDM: CPT | Mod: 25

## 2022-01-24 PROCEDURE — 84484 ASSAY OF TROPONIN QUANT: CPT | Performed by: EMERGENCY MEDICINE

## 2022-01-24 PROCEDURE — 84703 CHORIONIC GONADOTROPIN ASSAY: CPT | Performed by: EMERGENCY MEDICINE

## 2022-01-24 PROCEDURE — 85004 AUTOMATED DIFF WBC COUNT: CPT | Performed by: EMERGENCY MEDICINE

## 2022-01-24 RX ORDER — ACETAMINOPHEN 325 MG/1
650 TABLET ORAL ONCE
Status: COMPLETED | OUTPATIENT
Start: 2022-01-24 | End: 2022-01-24

## 2022-01-24 RX ORDER — IOPAMIDOL 755 MG/ML
100 INJECTION, SOLUTION INTRAVASCULAR ONCE
Status: COMPLETED | OUTPATIENT
Start: 2022-01-24 | End: 2022-01-24

## 2022-01-24 RX ORDER — ASPIRIN 325 MG
325 TABLET ORAL ONCE
Status: COMPLETED | OUTPATIENT
Start: 2022-01-24 | End: 2022-01-24

## 2022-01-24 RX ADMIN — ASPIRIN 325 MG ORAL TABLET 325 MG: 325 PILL ORAL at 17:39

## 2022-01-24 RX ADMIN — IOPAMIDOL 100 ML: 755 INJECTION, SOLUTION INTRAVENOUS at 19:34

## 2022-01-24 RX ADMIN — ACETAMINOPHEN 650 MG: 325 TABLET ORAL at 20:35

## 2022-01-24 ASSESSMENT — MIFFLIN-ST. JEOR: SCORE: 1975.07

## 2022-01-24 NOTE — ED PROVIDER NOTES
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   01/24/22 2000 -- 100 18 96 % -- --   01/24/22 1845 (!) 146/64 99 19 97 % -- --   01/24/22 1830 132/63 95 18 97 % -- --   01/24/22 1815 (!) 150/81 97 22 97 % -- --   01/24/22 1800 (!) 145/72 96 16 97 % -- --   01/24/22 1745 138/59 96 17 97 % -- --   01/24/22 1730 (!) 147/85 101 19 97 % -- --   01/24/22 1715 (!) 146/70 94 18 97 % -- --   01/24/22 1700 (!) 163/81 90 15 97 % -- --   01/24/22 1658 (!) 176/84 91 9 98 % -- --  "  01/24/22 1657 -- -- -- -- 1.575 m (5' 2\") 130.2 kg (287 lb)       PHYSICAL EXAM    GENERAL: Awake, alert.  In no acute distress.   HEENT: Normocephalic, atraumatic.  Pupils equal, round and reactive.  Conjunctiva normal.  EOMI.  NECK: No stridor or apparent deformity.  PULMONARY: Symmetrical breath sounds without distress.  Lungs clear to auscultation bilaterally without wheezes, rhonchi or rales.  CARDIO: Regular rate and rhythm.  No significant murmur, rub or gallop.  Radial pulses strong and symmetrical.  ABDOMINAL: Abdomen soft, non-distended and non-tender to palpation.  No CVAT, no palpable hepatosplenomegaly.  EXTREMITIES: No lower extremity swelling or edema.    NEURO: Alert and oriented to person, place and time.  Cranial nerves grossly intact.  No focal motor deficit.  PSYCH: Normal mood and affect  SKIN: No rashes      LAB:  All pertinent labs reviewed and interpreted.  Results for orders placed or performed during the hospital encounter of 01/24/22   CT Chest Pulmonary Embolism w Contrast    Impression    IMPRESSION:  1.  No pulmonary embolism.    2.  Fatty liver.     3.  Cholecystectomy.   HCG qualitative Blood   Result Value Ref Range    hCG Serum Qualitative Negative Negative   Result Value Ref Range    Troponin I <0.01 0.00 - 0.29 ng/mL   Basic metabolic panel   Result Value Ref Range    Sodium 138 136 - 145 mmol/L    Potassium 4.0 3.5 - 5.0 mmol/L    Chloride 102 98 - 107 mmol/L    Carbon Dioxide (CO2) 28 22 - 31 mmol/L    Anion Gap 8 5 - 18 mmol/L    Urea Nitrogen 14 8 - 22 mg/dL    Creatinine 0.64 0.60 - 1.10 mg/dL    Calcium 9.5 8.5 - 10.5 mg/dL    Glucose 113 70 - 125 mg/dL    GFR Estimate >90 >60 mL/min/1.73m2   Symptomatic; Unknown Influenza A/B & SARS-CoV2 (COVID-19) Virus PCR Multiplex Nasopharyngeal    Specimen: Nasopharyngeal; Swab   Result Value Ref Range    Influenza A PCR Negative Negative    Influenza B PCR Negative Negative    SARS CoV2 PCR Negative Negative   CBC with platelets and " differential   Result Value Ref Range    WBC Count 10.6 4.0 - 11.0 10e3/uL    RBC Count 4.64 3.80 - 5.20 10e6/uL    Hemoglobin 13.1 11.7 - 15.7 g/dL    Hematocrit 40.9 35.0 - 47.0 %    MCV 88 78 - 100 fL    MCH 28.2 26.5 - 33.0 pg    MCHC 32.0 31.5 - 36.5 g/dL    RDW 13.6 10.0 - 15.0 %    Platelet Count 258 150 - 450 10e3/uL    % Neutrophils 76 %    % Lymphocytes 16 %    % Monocytes 5 %    % Eosinophils 3 %    % Basophils 0 %    % Immature Granulocytes 0 %    NRBCs per 100 WBC 0 <1 /100    Absolute Neutrophils 8.1 1.6 - 8.3 10e3/uL    Absolute Lymphocytes 1.7 0.8 - 5.3 10e3/uL    Absolute Monocytes 0.5 0.0 - 1.3 10e3/uL    Absolute Eosinophils 0.3 0.0 - 0.7 10e3/uL    Absolute Basophils 0.0 0.0 - 0.2 10e3/uL    Absolute Immature Granulocytes 0.0 <=0.4 10e3/uL    Absolute NRBCs 0.0 10e3/uL       RADIOLOGY:  Reviewed all pertinent imaging. Please see official radiology report.  CT Chest Pulmonary Embolism w Contrast   Final Result   IMPRESSION:   1.  No pulmonary embolism.      2.  Fatty liver.       3.  Cholecystectomy.            EKG:    Reviewed and interpreted as:     Performed at 1659 1/24/2022  Vent.rate:  90 bpm  Sinus rhythm  Normal ECG  When compared with ECG of 1/17/2022 0139, no significant change was found.    I have independently reviewed and interpreted the EKG(s) documented above.    I, Pau Fu, am serving as a scribe to document services personally performed by Dr. Mavis Garcia based on my observation and the provider's statements to me. I, Mavis Garcia MD attest that Pau Fu is acting in a scribe capacity, has observed my performance of the services and has documented them in accordance with my direction.       Mavis Garcia MD  01/24/22 2120

## 2022-01-24 NOTE — Clinical Note
Nevin Alvarado was seen and treated in our emergency department on 1/24/2022.  She may return to work on 01/25/2022.       If you have any questions or concerns, please don't hesitate to call.      Mavis Garcia MD

## 2022-01-25 LAB
ATRIAL RATE - MUSE: 90 BPM
DIASTOLIC BLOOD PRESSURE - MUSE: 84 MMHG
INTERPRETATION ECG - MUSE: NORMAL
P AXIS - MUSE: 30 DEGREES
PR INTERVAL - MUSE: 148 MS
QRS DURATION - MUSE: 78 MS
QT - MUSE: 350 MS
QTC - MUSE: 428 MS
R AXIS - MUSE: 62 DEGREES
SYSTOLIC BLOOD PRESSURE - MUSE: 176 MMHG
T AXIS - MUSE: 15 DEGREES
VENTRICULAR RATE- MUSE: 90 BPM

## 2022-01-29 ENCOUNTER — HOSPITAL ENCOUNTER (OUTPATIENT)
Facility: HOSPITAL | Age: 32
Discharge: HOME | End: 2022-01-30
Attending: EMERGENCY MEDICINE | Admitting: INTERNAL MEDICINE
Payer: COMMERCIAL

## 2022-01-29 ENCOUNTER — APPOINTMENT (OUTPATIENT)
Dept: RADIOLOGY | Facility: HOSPITAL | Age: 32
End: 2022-01-29
Attending: EMERGENCY MEDICINE
Payer: COMMERCIAL

## 2022-01-29 DIAGNOSIS — T18.9XXA SWALLOWED FOREIGN BODY, INITIAL ENCOUNTER: Primary | ICD-10-CM
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
## 2022-01-29 PROCEDURE — 99285 EMERGENCY DEPT VISIT HI MDM: CPT | Mod: 25

## 2022-01-29 PROCEDURE — 90791 PSYCH DIAGNOSTIC EVALUATION: CPT

## 2022-01-29 PROCEDURE — 87635 SARS-COV-2 COVID-19 AMP PRB: CPT | Performed by: EMERGENCY MEDICINE

## 2022-01-29 PROCEDURE — 250N000009 HC RX 250: Performed by: EMERGENCY MEDICINE

## 2022-01-29 PROCEDURE — 74018 RADEX ABDOMEN 1 VIEW: CPT

## 2022-01-29 RX ORDER — KETOROLAC TROMETHAMINE 30 MG/ML
15 INJECTION, SOLUTION INTRAMUSCULAR; INTRAVENOUS ONCE
Status: COMPLETED | OUTPATIENT
Start: 2022-01-30 | End: 2022-01-30

## 2022-01-29 RX ORDER — LIDOCAINE HYDROCHLORIDE 20 MG/ML
10 SOLUTION OROPHARYNGEAL ONCE
Status: COMPLETED | OUTPATIENT
Start: 2022-01-29 | End: 2022-01-29

## 2022-01-29 RX ADMIN — LIDOCAINE HYDROCHLORIDE 10 ML: 20 SOLUTION ORAL; TOPICAL at 22:14

## 2022-01-29 ASSESSMENT — ENCOUNTER SYMPTOMS
ABDOMINAL PAIN: 1
NAUSEA: 0
BACK PAIN: 1

## 2022-01-30 ENCOUNTER — ANESTHESIA (OUTPATIENT)
Dept: SURGERY | Facility: HOSPITAL | Age: 32
End: 2022-01-30
Payer: COMMERCIAL

## 2022-01-30 ENCOUNTER — ANESTHESIA EVENT (OUTPATIENT)
Dept: SURGERY | Facility: HOSPITAL | Age: 32
End: 2022-01-30
Payer: COMMERCIAL

## 2022-01-30 ENCOUNTER — HOSPITAL ENCOUNTER (EMERGENCY)
Facility: HOSPITAL | Age: 32
Discharge: HOME | End: 2022-01-30
Attending: STUDENT IN AN ORGANIZED HEALTH CARE EDUCATION/TRAINING PROGRAM
Payer: COMMERCIAL

## 2022-01-30 VITALS
RESPIRATION RATE: 18 BRPM | HEART RATE: 131 BPM | DIASTOLIC BLOOD PRESSURE: 79 MMHG | TEMPERATURE: 97.1 F | BODY MASS INDEX: 54.44 KG/M2 | OXYGEN SATURATION: 94 % | SYSTOLIC BLOOD PRESSURE: 156 MMHG | WEIGHT: 293 LBS

## 2022-01-30 VITALS — RESPIRATION RATE: 20 BRPM | HEART RATE: 98 BPM | OXYGEN SATURATION: 96 %

## 2022-01-30 DIAGNOSIS — T18.9XXA SWALLOWED FOREIGN BODY, INITIAL ENCOUNTER: ICD-10-CM

## 2022-01-30 LAB
ATRIAL RATE - MUSE: 99 BPM
DIASTOLIC BLOOD PRESSURE - MUSE: NORMAL MMHG
GLUCOSE BLDC GLUCOMTR-MCNC: 118 MG/DL (ref 70–99)
HOLD SPECIMEN: NORMAL
INTERPRETATION ECG - MUSE: NORMAL
P AXIS - MUSE: 44 DEGREES
PR INTERVAL - MUSE: 152 MS
QRS DURATION - MUSE: 74 MS
QT - MUSE: 350 MS
QTC - MUSE: 449 MS
R AXIS - MUSE: 58 DEGREES
SARS-COV-2 RNA RESP QL NAA+PROBE: NEGATIVE
SYSTOLIC BLOOD PRESSURE - MUSE: NORMAL MMHG
T AXIS - MUSE: 19 DEGREES
UPPER GI ENDOSCOPY: NORMAL
VENTRICULAR RATE- MUSE: 99 BPM

## 2022-01-30 PROCEDURE — 250N000011 HC RX IP 250 OP 636: Performed by: EMERGENCY MEDICINE

## 2022-01-30 PROCEDURE — 96374 THER/PROPH/DIAG INJ IV PUSH: CPT

## 2022-01-30 PROCEDURE — 258N000003 HC RX IP 258 OP 636: Performed by: NURSE ANESTHETIST, CERTIFIED REGISTERED

## 2022-01-30 PROCEDURE — 272N000001 HC OR GENERAL SUPPLY STERILE: Performed by: INTERNAL MEDICINE

## 2022-01-30 PROCEDURE — 96375 TX/PRO/DX INJ NEW DRUG ADDON: CPT | Mod: 59

## 2022-01-30 PROCEDURE — 250N000011 HC RX IP 250 OP 636: Performed by: NURSE ANESTHETIST, CERTIFIED REGISTERED

## 2022-01-30 PROCEDURE — 710N000009 HC RECOVERY PHASE 1, LEVEL 1, PER MIN: Performed by: INTERNAL MEDICINE

## 2022-01-30 PROCEDURE — C9113 INJ PANTOPRAZOLE SODIUM, VIA: HCPCS | Performed by: EMERGENCY MEDICINE

## 2022-01-30 PROCEDURE — 250N000011 HC RX IP 250 OP 636: Performed by: ANESTHESIOLOGY

## 2022-01-30 PROCEDURE — 96376 TX/PRO/DX INJ SAME DRUG ADON: CPT | Mod: 59

## 2022-01-30 PROCEDURE — 250N000009 HC RX 250: Performed by: NURSE ANESTHETIST, CERTIFIED REGISTERED

## 2022-01-30 PROCEDURE — 93005 ELECTROCARDIOGRAM TRACING: CPT | Mod: XU | Performed by: STUDENT IN AN ORGANIZED HEALTH CARE EDUCATION/TRAINING PROGRAM

## 2022-01-30 PROCEDURE — 999N000141 HC STATISTIC PRE-PROCEDURE NURSING ASSESSMENT: Performed by: INTERNAL MEDICINE

## 2022-01-30 PROCEDURE — 258N000003 HC RX IP 258 OP 636: Performed by: ANESTHESIOLOGY

## 2022-01-30 PROCEDURE — 370N000017 HC ANESTHESIA TECHNICAL FEE, PER MIN: Performed by: INTERNAL MEDICINE

## 2022-01-30 PROCEDURE — 360N000075 HC SURGERY LEVEL 2, PER MIN: Performed by: INTERNAL MEDICINE

## 2022-01-30 PROCEDURE — 250N000025 HC SEVOFLURANE, PER MIN: Performed by: INTERNAL MEDICINE

## 2022-01-30 RX ORDER — FLUMAZENIL 0.1 MG/ML
0.2 INJECTION, SOLUTION INTRAVENOUS
Status: CANCELLED | OUTPATIENT
Start: 2022-01-30 | End: 2022-01-30

## 2022-01-30 RX ORDER — ONDANSETRON 4 MG/1
4 TABLET, ORALLY DISINTEGRATING ORAL EVERY 30 MIN PRN
Status: DISCONTINUED | OUTPATIENT
Start: 2022-01-30 | End: 2022-01-30 | Stop reason: HOSPADM

## 2022-01-30 RX ORDER — SODIUM CHLORIDE, SODIUM LACTATE, POTASSIUM CHLORIDE, CALCIUM CHLORIDE 600; 310; 30; 20 MG/100ML; MG/100ML; MG/100ML; MG/100ML
INJECTION, SOLUTION INTRAVENOUS CONTINUOUS
Status: DISCONTINUED | OUTPATIENT
Start: 2022-01-30 | End: 2022-01-30 | Stop reason: HOSPADM

## 2022-01-30 RX ORDER — HYDROMORPHONE HYDROCHLORIDE 1 MG/ML
0.5 INJECTION, SOLUTION INTRAMUSCULAR; INTRAVENOUS; SUBCUTANEOUS ONCE
Status: COMPLETED | OUTPATIENT
Start: 2022-01-30 | End: 2022-01-30

## 2022-01-30 RX ORDER — ONDANSETRON 2 MG/ML
INJECTION INTRAMUSCULAR; INTRAVENOUS PRN
Status: DISCONTINUED | OUTPATIENT
Start: 2022-01-30 | End: 2022-01-30

## 2022-01-30 RX ORDER — LIDOCAINE 40 MG/G
CREAM TOPICAL
Status: DISCONTINUED | OUTPATIENT
Start: 2022-01-30 | End: 2022-01-30 | Stop reason: HOSPADM

## 2022-01-30 RX ORDER — KETAMINE HYDROCHLORIDE 50 MG/ML
INJECTION, SOLUTION INTRAMUSCULAR; INTRAVENOUS PRN
Status: DISCONTINUED | OUTPATIENT
Start: 2022-01-30 | End: 2022-01-30

## 2022-01-30 RX ORDER — NALOXONE HYDROCHLORIDE 0.4 MG/ML
0.4 INJECTION, SOLUTION INTRAMUSCULAR; INTRAVENOUS; SUBCUTANEOUS
Status: DISCONTINUED | OUTPATIENT
Start: 2022-01-30 | End: 2022-01-30 | Stop reason: HOSPADM

## 2022-01-30 RX ORDER — HYDROMORPHONE HYDROCHLORIDE 1 MG/ML
0.5 INJECTION, SOLUTION INTRAMUSCULAR; INTRAVENOUS; SUBCUTANEOUS
Status: COMPLETED | OUTPATIENT
Start: 2022-01-30 | End: 2022-01-30

## 2022-01-30 RX ORDER — FENTANYL CITRATE 50 UG/ML
INJECTION, SOLUTION INTRAMUSCULAR; INTRAVENOUS PRN
Status: DISCONTINUED | OUTPATIENT
Start: 2022-01-30 | End: 2022-01-30

## 2022-01-30 RX ORDER — NALOXONE HYDROCHLORIDE 0.4 MG/ML
0.2 INJECTION, SOLUTION INTRAMUSCULAR; INTRAVENOUS; SUBCUTANEOUS
Status: DISCONTINUED | OUTPATIENT
Start: 2022-01-30 | End: 2022-01-30 | Stop reason: HOSPADM

## 2022-01-30 RX ORDER — FENTANYL CITRATE 50 UG/ML
25 INJECTION, SOLUTION INTRAMUSCULAR; INTRAVENOUS EVERY 5 MIN PRN
Status: DISCONTINUED | OUTPATIENT
Start: 2022-01-30 | End: 2022-01-30 | Stop reason: HOSPADM

## 2022-01-30 RX ORDER — ONDANSETRON 2 MG/ML
4 INJECTION INTRAMUSCULAR; INTRAVENOUS EVERY 30 MIN PRN
Status: DISCONTINUED | OUTPATIENT
Start: 2022-01-30 | End: 2022-01-30 | Stop reason: HOSPADM

## 2022-01-30 RX ORDER — DEXAMETHASONE SODIUM PHOSPHATE 4 MG/ML
INJECTION, SOLUTION INTRA-ARTICULAR; INTRALESIONAL; INTRAMUSCULAR; INTRAVENOUS; SOFT TISSUE PRN
Status: DISCONTINUED | OUTPATIENT
Start: 2022-01-30 | End: 2022-01-30

## 2022-01-30 RX ORDER — FENTANYL CITRATE 50 UG/ML
25 INJECTION, SOLUTION INTRAMUSCULAR; INTRAVENOUS
Status: DISCONTINUED | OUTPATIENT
Start: 2022-01-30 | End: 2022-01-30 | Stop reason: HOSPADM

## 2022-01-30 RX ORDER — MEPERIDINE HYDROCHLORIDE 25 MG/ML
12.5 INJECTION INTRAMUSCULAR; INTRAVENOUS; SUBCUTANEOUS
Status: DISCONTINUED | OUTPATIENT
Start: 2022-01-30 | End: 2022-01-30 | Stop reason: HOSPADM

## 2022-01-30 RX ORDER — LIDOCAINE HYDROCHLORIDE 20 MG/ML
INJECTION, SOLUTION INFILTRATION; PERINEURAL PRN
Status: DISCONTINUED | OUTPATIENT
Start: 2022-01-30 | End: 2022-01-30

## 2022-01-30 RX ORDER — HYDROMORPHONE HCL IN WATER/PF 6 MG/30 ML
0.2 PATIENT CONTROLLED ANALGESIA SYRINGE INTRAVENOUS EVERY 5 MIN PRN
Status: DISCONTINUED | OUTPATIENT
Start: 2022-01-30 | End: 2022-01-30 | Stop reason: HOSPADM

## 2022-01-30 RX ORDER — DIPHENHYDRAMINE HYDROCHLORIDE 50 MG/ML
50 INJECTION INTRAMUSCULAR; INTRAVENOUS ONCE
Status: COMPLETED | OUTPATIENT
Start: 2022-01-30 | End: 2022-01-30

## 2022-01-30 RX ORDER — PROPOFOL 10 MG/ML
INJECTION, EMULSION INTRAVENOUS PRN
Status: DISCONTINUED | OUTPATIENT
Start: 2022-01-30 | End: 2022-01-30

## 2022-01-30 RX ORDER — ONDANSETRON 2 MG/ML
4 INJECTION INTRAMUSCULAR; INTRAVENOUS ONCE
Status: COMPLETED | OUTPATIENT
Start: 2022-01-30 | End: 2022-01-30

## 2022-01-30 RX ADMIN — ONDANSETRON 4 MG: 2 INJECTION INTRAMUSCULAR; INTRAVENOUS at 08:33

## 2022-01-30 RX ADMIN — LIDOCAINE HYDROCHLORIDE 60 MG: 20 INJECTION, SOLUTION INFILTRATION; PERINEURAL at 08:27

## 2022-01-30 RX ADMIN — MIDAZOLAM 2 MG: 1 INJECTION INTRAMUSCULAR; INTRAVENOUS at 08:18

## 2022-01-30 RX ADMIN — KETOROLAC TROMETHAMINE 15 MG: 30 INJECTION, SOLUTION INTRAMUSCULAR at 00:22

## 2022-01-30 RX ADMIN — DEXAMETHASONE SODIUM PHOSPHATE 10 MG: 4 INJECTION, SOLUTION INTRA-ARTICULAR; INTRALESIONAL; INTRAMUSCULAR; INTRAVENOUS; SOFT TISSUE at 08:27

## 2022-01-30 RX ADMIN — DIPHENHYDRAMINE HYDROCHLORIDE 50 MG: 50 INJECTION INTRAMUSCULAR; INTRAVENOUS at 09:30

## 2022-01-30 RX ADMIN — HYDROMORPHONE HYDROCHLORIDE 0.2 MG: 0.2 INJECTION, SOLUTION INTRAMUSCULAR; INTRAVENOUS; SUBCUTANEOUS at 09:05

## 2022-01-30 RX ADMIN — PANTOPRAZOLE SODIUM 40 MG: 40 INJECTION, POWDER, FOR SOLUTION INTRAVENOUS at 00:21

## 2022-01-30 RX ADMIN — ONDANSETRON 4 MG: 2 INJECTION INTRAMUSCULAR; INTRAVENOUS at 02:30

## 2022-01-30 RX ADMIN — HYDROMORPHONE HYDROCHLORIDE 0.5 MG: 1 INJECTION, SOLUTION INTRAMUSCULAR; INTRAVENOUS; SUBCUTANEOUS at 05:50

## 2022-01-30 RX ADMIN — SODIUM CHLORIDE, POTASSIUM CHLORIDE, SODIUM LACTATE AND CALCIUM CHLORIDE: 600; 310; 30; 20 INJECTION, SOLUTION INTRAVENOUS at 08:21

## 2022-01-30 RX ADMIN — KETAMINE HYDROCHLORIDE 50 MG: 50 INJECTION, SOLUTION INTRAMUSCULAR; INTRAVENOUS at 08:27

## 2022-01-30 RX ADMIN — ONDANSETRON 4 MG: 2 INJECTION INTRAMUSCULAR; INTRAVENOUS at 09:09

## 2022-01-30 RX ADMIN — SUCCINYLCHOLINE CHLORIDE 200 MG: 20 INJECTION, SOLUTION INTRAMUSCULAR; INTRAVENOUS at 08:27

## 2022-01-30 RX ADMIN — FENTANYL CITRATE 100 MCG: 50 INJECTION, SOLUTION INTRAMUSCULAR; INTRAVENOUS at 08:27

## 2022-01-30 RX ADMIN — HYDROMORPHONE HYDROCHLORIDE 0.2 MG: 0.2 INJECTION, SOLUTION INTRAMUSCULAR; INTRAVENOUS; SUBCUTANEOUS at 09:19

## 2022-01-30 RX ADMIN — PHENYLEPHRINE HYDROCHLORIDE 100 MCG: 10 INJECTION INTRAVENOUS at 08:33

## 2022-01-30 RX ADMIN — HYDROMORPHONE HYDROCHLORIDE 0.5 MG: 1 INJECTION, SOLUTION INTRAMUSCULAR; INTRAVENOUS; SUBCUTANEOUS at 02:01

## 2022-01-30 RX ADMIN — PROPOFOL 200 MG: 10 INJECTION, EMULSION INTRAVENOUS at 08:27

## 2022-01-30 NOTE — ED NOTES
Pt walked to bathroom, while in the bathroom pt put on call light.  When Mariama SWARTZ went in to bathroom, pt was sitting on the floor.  Pt states that she felt her heart racing and thought she was going to pass out.  Pt states she did not fall, pt states she lowered herself to the floor.  Wheel chair brought to bathroom and pt brought back to room.  .  Pt placed on cardiac monitor and ekg being obtained. Dr. Cazares notified

## 2022-01-30 NOTE — ED PROVIDER NOTES
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 Severity: Low; HUT Noted: [id number]     Lamotrigine Rash     Possibly associated with Lamictal.   HUT Comment: Possibly associated with Lamictal. ; HUT Reaction: Rash; HUT Reaction Type: Allergy; HUT Severity: Low; HUT Noted: [id number]     Latex Rash     HUT Reaction: Rash; HUT Reaction Type: Allergy; HUT Severity: Low; HUT Noted: [id number]       FAMILY HISTORY:  Family History   Problem Relation Age of Onset     Diabetes Type 2  Maternal Grandmother      Diabetes Type 2  Paternal Grandmother      Breast Cancer Paternal Grandmother      Cerebrovascular  Disease Father 53     Myocardial Infarction No family hx of      Coronary Artery Disease Early Onset No family hx of      Ovarian Cancer No family hx of      Colon Cancer No family hx of      Depression Mother      Anxiety Disorder Mother        SOCIAL HISTORY:   Social History     Tobacco Use     Smoking status: Never Smoker     Smokeless tobacco: Never Used   Vaping Use     Vaping Use: Not on file   Substance Use Topics     Alcohol use: No     Alcohol/week: 0.0 standard drinks     Drug use: No        PHYSICAL EXAM:    Vitals: BP (!) 173/81   Pulse 94   Temp 97.8  F (36.6  C) (Temporal)   Resp 22   Wt 135 kg (297 lb 9.9 oz)   SpO2 98%   BMI 54.44 kg/m     General:. Alert and interactive, comfortable appearing.  HENT: Oropharynx without erythema or exudates. MMM.  TMs clear bilaterally.  Eyes: Pupils mid-sized and equally reactive.   Neck: Full AROM.  No midline tenderness to palpation.  Cardiovascular: Regular rate and rhythm. Peripheral pulses 2+ bilaterally.  Chest/Pulmonary: Normal work of breathing. Lung sounds clear and equal throughout, no wheezes or crackles. No chest wall tenderness or deformities.  Abdomen: Soft, obese, nondistended. Mild epigastric tenderness without guarding or rebound.  Back/Spine: No CVA or midline tenderness.  Extremities: Normal ROM of all major joints. No lower extremity edema.   Skin: Warm and dry. Normal skin color.   Neuro: Speech clear. CNs grossly intact. Moves all extremities appropriately. Strength and sensation grossly intact to all extremities.   Psych: Normal affect/mood, cooperative, memory appropriate.     LAB:  All pertinent labs reviewed and interpreted.  Labs Ordered and Resulted from Time of ED Arrival to Time of ED Departure   COVID-19 VIRUS (CORONAVIRUS) BY PCR - Normal       Result Value    SARS CoV2 PCR Negative         RADIOLOGY:  XR Abdomen Upright Only   Final Result   IMPRESSION: Metallic foreign body projecting over the left upper quadrant likely  within the mid to distal stomach. This linear radiopaque foreign body (uncurled paper clip) measures approximately 8 cm in length. No free intraperitoneal air or air-fluid    levels. Minimal amount of stool in the proximal colon. Thoracolumbar curvature.      Results discussed with Dr. Marianela Gatica at 10:15 PM 01/29/2022.              I, David Gates, am serving as a scribe to document services personally performed by Dr. Marianela Beach  based on my observation and the provider's statements to me. I, Marianela Beach MD attest that David Gates is acting in a scribe capacity, has observed my performance of the services and has documented them in accordance with my direction.      Marianela Beach M.D.  Emergency Medicine  [facility] EMERGENCY DEPARTMENT  [facility] [facility] [address]  [phone]  Dept: [phone]         Marianela Beach MD  01/30/22 0048

## 2022-01-30 NOTE — PROGRESS NOTES
Pre-procedure Note    Reason for procedure: Foreign body ingestion    History and Physical Reviewed: Reviewed, no changes.  32 yo F, recurrent foreign body ingestions. Presented after swallowing a paperclip last evening. Reports abdominal pain in LUQ. No nausea, emesis. No fever.    Pre-sedation assessment:  /79   Pulse 92   Temp 98  F (36.7  C) (Temporal)   Resp 16   Wt 135 kg (297 lb 9.9 oz)   SpO2 97%   BMI 54.44 kg/m      General: alert, appears stated age, and cooperative  Airway: normal  Heart: regular rate and rhythm  Lungs: clear to auscultation bilaterally  Abdom: SOft, obese, mild TTP in LUQ, no rebound or guarding BS+  Extrem: PPI, mild peripheral edema    Sedation Plan based on assessment: MAC    Mallampati score: Class II (visualization of the soft palate, fauces, and uvula)          ASA Classification: ASA 3 - Patient with moderate systemic disease with functional limitations    Imaging:  AbdXR  IMPRESSION: Metallic foreign body projecting over the left upper quadrant likely within the mid to distal stomach. This linear radiopaque foreign body (uncurled paper clip) measures approximately 8 cm in length. No free intraperitoneal air or air-fluid   levels. Minimal amount of stool in the proximal colon. Thoracolumbar curvature.    Impression: Patient deemed adequate candidate for MAC sedation    Risks, benefits and alternatives were discussed with the patient and informed consent was obtained. Spoke with legal guardian. Agreeable to proceed. Consent from pt.    Plan: esophagogastroduodenoscopy      Bird Sethi MD 1/30/2022 8:16 AM                                               Bird Sethi MD  Thank you for the opportunity to participate in the care of this patient.   Please feel free to call me with any questions or concerns.  Phone number [phone].

## 2022-01-30 NOTE — ED NOTES
This nurse went in to room to talk with pt.  Pt states that when she went to the bathroom, she had a lot of mucous come out.  This nurse told pt it was most likely due to the paper clip that she swallowed and her body trying to get rid of it.  Pt then asked if it was time that she could have her pain meds.  This nurse talked with MD who then ordered another dose of pain meds.

## 2022-01-30 NOTE — ED PROVIDER NOTES
ED SIGNOUT  Date/Time:1/30/2022 5:01 AM    Patient signed out to me by my colleague, Dr. Beach.  Please see their note for complete history and physical. General surgery will take patient to the OR in the morning.     The creation of this record is based on the scribe s observations of the work being performed by Campbell Sage and the provider s statements to them. It was created on their behalf by Lilliana Torres, a trained medical scribe. This document has been checked and approved by the attending provider.      REMAINING ED WORKUP:    Vitals:  /73   Pulse 99   Temp 97.8  F (36.6  C) (Temporal)   Resp 14   Wt 135 kg (297 lb 9.9 oz)   SpO2 93%   BMI 54.44 kg/m      Pertinent labs results reviewed   Results for orders placed or performed during the hospital encounter of 01/29/22   XR Abdomen Upright Only    Impression    IMPRESSION: Metallic foreign body projecting over the left upper quadrant likely within the mid to distal stomach. This linear radiopaque foreign body (uncurled paper clip) measures approximately 8 cm in length. No free intraperitoneal air or air-fluid   levels. Minimal amount of stool in the proximal colon. Thoracolumbar curvature.    Results discussed with Dr. Marianela Gatica at 10:15 PM 01/29/2022.   Asymptomatic COVID-19 Virus (Coronavirus) by PCR Nasopharyngeal    Specimen: Nasopharyngeal; Swab   Result Value Ref Range    SARS CoV2 PCR Negative Negative   Extra Red Top Tube   Result Value Ref Range    Hold Specimen JIC    Extra Green Top (Lithium Heparin) Tube   Result Value Ref Range    Hold Specimen JIC    Extra Purple Top Tube   Result Value Ref Range    Hold Specimen JIC      Pertinent imaging reviewed   Please see official radiology report.  XR Abdomen Upright Only   Final Result   IMPRESSION: Metallic foreign body projecting over the left upper quadrant likely within the mid to distal stomach. This linear radiopaque foreign body (uncurled paper clip) measures approximately  8 cm in length. No free intraperitoneal air or air-fluid    levels. Minimal amount of stool in the proximal colon. Thoracolumbar curvature.      Results discussed with Dr. Marianela Gatica at 10:15 PM 01/29/2022.         Interventions  Medications   lidocaine (viscous) (XYLOCAINE) 2 % solution 10 mL (10 mLs Mouth/Throat Given 1/29/22 2214)   ketorolac (TORADOL) injection 15 mg (15 mg Intravenous Given 1/30/22 0022)   pantoprazole (PROTONIX) IV push injection 40 mg (40 mg Intravenous Given 1/30/22 0021)   HYDROmorphone (PF) (DILAUDID) injection 0.5 mg (0.5 mg Intravenous Given 1/30/22 0201)   ondansetron (ZOFRAN) injection 4 mg (4 mg Intravenous Given 1/30/22 0230)   HYDROmorphone (PF) (DILAUDID) injection 0.5 mg (0.5 mg Intravenous Given 1/30/22 0550)      EKG:    Performed at: 6:53 AM on January 30, 2022.    Impression: Normal sinus rhythm no signs of acute ST elevation ischemia or arrhythmia.    Rate: 99 bpm  Rhythm: Normal sinus rhythm  QRS Interval: 74 ms  QTc Interval: 449 ms  ST Changes: No ST elevations depressions.  Comparison: Compared to prior EKG from January 24, 2022 without any acute ischemic changes.    I have independently reviewed and interpreted the EKG(s) documented above.    ED Course/MDM:  5:01 AM Signout accepted from Dr. Beach.  Prior records were reviewed.  Diagnostics from this visit are reviewed.  6:22 AM I introduced myself to the patient.  6:58 AM prior to signout patient had gone to the bathroom.  She had called nursing to the bathroom where she had on own admission laid herself on the floor complaining of palpitations.  EKG was obtained which was unremarkable.  Patient asking for more pain medication which was refused after which patient able ambulate back to her bed without complication.  No evidence of any fall or injury and patient did admit to lying herself down on the floor in the bathroom.  7:00 AM patient signed out to Dr. Arias for disposition after GI procedure.    Patient signed  out pending endoscopy in the OR for removal of foreign body in the stomach.  Patient awaiting transport to the OR for this procedure and no other events during observation.  Patient signed out to day physician for disposition once foreign bodies removed in the operating room.           1. Swallowed foreign body, initial encounter    2. Intentional self-harm by sharp object, initial encounter (H)          Dr. Campbell Cazares  01/30/22  [facility]       Campbell Cazares MD  01/30/22 0622       Campbell Cazares MD  01/30/22 0722

## 2022-01-30 NOTE — ANESTHESIA PROCEDURE NOTES
Airway       Patient location during procedure: OR       Procedure Start/Stop Times: 1/30/2022 8:28 AM  Staff -        CRNA: Gretel Holbrook APRN CRNA       Performed By: CRNA  Consent for Airway        Urgency: elective  Indications and Patient Condition       Indications for airway management: isma-procedural         Mask difficulty assessment: 1 - vent by mask    Final Airway Details       Final airway type: endotracheal airway       Successful airway: ETT - single  Endotracheal Airway Details        ETT size (mm): 7.0       Successful intubation technique: direct laryngoscopy       DL Blade Type: MAC 4       Grade View of Cords: 2       Adjucts: stylet       Position: Right       Measured from: gums/teeth       Secured at (cm): 23       Bite Block used: GI block.    Post intubation assessment        Placement verified by: capnometry and equal breath sounds        Number of attempts at approach: 1       Secured with: silk tape       Ease of procedure: easy (needed cricoid)       Dentition: Intact and Unchanged

## 2022-01-30 NOTE — ED NOTES
While this nurse was settling another ambulance, pt had call light on.  Gustavo MORALES answered call light but pt told him she did not feel comfortable talking to a male nurse.  Pt was told that this nurse was in with an ambulance and would be some time.  Pt agreeable to wait

## 2022-01-30 NOTE — ED NOTES
Patient on a call light again. The writer stood at the door and asked the patient if she needed help right away. The patient asked the nurse to step in to her room and so she could discuss pain status. The writer told the patient that he does not feel comfortable being in the room by himself based on the statement that the patient had made earlier.

## 2022-01-30 NOTE — ED NOTES
"This writer answered the patient's call light. Patient asked to see her nurse. The writer informed her that her primary nurse was settling an ambulance and would take a little bit before she could come see her. The writer asked the patient if she needed help right away. Patient stated \"I do not feel comfortable discussing my care with a male nurse\" Patient assured that the primary nurse will be notified.  "

## 2022-01-30 NOTE — ANESTHESIA POSTPROCEDURE EVALUATION
Patient: Nevin Alvarado    Procedure: Procedure(s):  ESOPHAGOGASTRODUODENOSCOPY (EGD) FOREIGN BODY REMOVAL       Diagnosis:Swallowed foreign body, initial encounter [T18.9XXA]  Diagnosis Additional Information: No value filed.    Anesthesia Type:  General    Note:  Disposition: Inpatient   Postop Pain Control: Uneventful            Sign Out: Well controlled pain   PONV: No   Neuro/Psych: Uneventful            Sign Out: Acceptable/Baseline neuro status   Airway/Respiratory: Uneventful            Sign Out: Acceptable/Baseline resp. status   CV/Hemodynamics: Uneventful            Sign Out: Acceptable CV status; No obvious hypovolemia; No obvious fluid overload   Other NRE: NONE   DID A NON-ROUTINE EVENT OCCUR?            Last vitals:  Vitals Value Taken Time   /79 01/30/22 0915   Temp     Pulse 111 01/30/22 0921   Resp 20 01/30/22 0921   SpO2 99 % 01/30/22 0921   Vitals shown include unvalidated device data.    Electronically Signed By: Dick Yates MD  January 30, 2022  9:22 AM

## 2022-01-30 NOTE — ED NOTES
1/29/2022  Nevin Alvarado 1990     [facility] Crisis Assessment    Patient was assessed: remote  Patient location: [facility] ED    Referral Data and Chief Complaint  Nevin is a 31 year old who uses she/her pronouns. Patient presented to the ED alone and was referred to the ED by community provider(s). Patient is presenting to the ED for the following concerns: depression and ingestion of paper clip/self harm.      Informed Consent and Assessment Methods    Patient is under the guardianship of [employer] .  Writer met with patient and explained the crisis assessment process, including applicable information disclosures and limits to confidentiality, assessed understanding of the process, and obtained consent to proceed with the assessment. Patient was observed to be able to participate in the assessment as evidenced by pt was alert and oriented . Assessment methods included conducting a formal interview with patient, review of medical records, collaboration with medical staff, and obtaining relevant collateral information from family and community providers when available.    Narrative Summary of Presenting Problem and Current Functioning  What led to the patient presenting for crisis services, factors that make the crisis life threatening or complex, stressors, how is this disrupting the patient's life, and how current functioning is in comparison to baseline. How is patient presenting during the assessment.     Pt reports she came to the ED herself after ingesting a paper clip at her group home tonight. Reports symptoms of anxiety and depression leading up to today's episode. Pt reports she has not self harmed since August of 2021 and overall has being do well with managing her mental health. Reports she was recently physically ill and is recovering still. Reports recent ED and urgent care visits were triggers for past episodes of self harm and medical interventions. Pt was calm,  cooperative and engaged with assessment process.     History of the Crisis  Duration of the current crisis, coping skills attempted to reduce the crisis, community resources used, and past presentations.    Pt reports for the last several weeks feeling more anxious and depressed. Pt reports she has been unable to work until this lats week due to physical illness and not having a pay check soon is also a stressor. Pt reports she lives in a group home and has outpatient providers she meets with weekly for therapy services and skills services. Pt has extensive history of self harm/ingestion of items as well as inserting items in body cavaties. Pt reports no intentional self harm since August 2021.    Collateral Information  Spoke to pts /staff; Irais at #[phone] and she also provided her cell phone #[phone]. She collaborated pts story and reports she was surprised with the events of the night. She reports she spoke to pt before and after the ingestion. She reports pt did not initially tell her about the ingestion and then once she did they both agreed pt needed to come to the ED. Irais collaborated pts report that her recent meeting with care team was discussing pts positive progress and goals to move in adult foster care/more independent living option.    Risk Assessment    Risk of Harm to Self     ESS-6  1.a. Over the past 2 weeks, have you had thoughts of killing yourself? No  1.b. Have you ever attempted to kill yourself and, if yes, when did this last happen? No   2. Recent or current suicide plan? No   3. Recent or current intent to act on ideation? No  4. Lifetime psychiatric hospitalization? Yes  5. Pattern of excessive substance use? No  6. Current irritability, agitation, or aggression? No  Scoring note: BOTH 1a and 1b must be yes for it to score 1 point, if both are not yes it is zero. All others are 1 point per number. If all questions 1a/1b - 6 are no, risk is negligible.  If one of 1a/1b is yes, then risk is mild. If either question 2 or 3, but not both, is yes, then risk is automatically moderate regardless of total score. If both 2 and 3 are yes, risk is automatically high regardless of total score.     Score: 1, mild risk    The patient has the following risk factors for suicide: depressive symptoms and poor impulse control    Is the patient experiencing current suicidal ideation: No    Is the patient engaging in preparatory suicide behaviors (formulating how to act on plan, giving away possessions, saying goodbye, displaying dramatic behavior changes, etc)? No    Does the patient have access to firearms or other lethal means? no    The patient has the following protective factors: social support, voluntarily seeking mental health support, established relationship community mental health provider(s), future focused thinking, displays insight, expresses desire to engage in treatment, safe/stable housing and fulfilling employment    Support system information: outpatient providers, group home staff, family and friends    Patient strengths: willing to share thoughts and feelings, able to identify coping skills    Does the patient engage in non-suicidal self-injurious behavior (NSSI/SIB)? no. However, patient has a history of SIB via ingestion and inseration of objects. Pt has not engaged in SIB since august 2021    Is the patient vulnerable to sexual exploitation?  No    Is the patient experiencing abuse or neglect? no    Is the patient a vulnerable adult? No      Risk of Harm to Others  The patient has the following risk factors of harm to others: no risk factors identified    Does the patient have thoughts of harming others? No    Is the patient engaging in sexually inappropriate behavior?  no       Current Substance Abuse    Is there recent substance abuse? no    Was a urine drug screen or blood alcohol level obtained: No        Current Symptoms/Concerns    Symptoms  Attention,  hyperactivity, and impulsivity symptoms present: No    Anxiety symptoms present: Yes: Generalized Symptoms: Avoidance, Cognitive anxiety - feelings of doom, racing thoughts, difficulty concentrating  and Excessive worry      Appetite symptoms present: No     Behavioral difficulties present: Yes: Displaces Blame, Impulsivity/Disinhibition and Withdrawal/Isolation     Cognitive impairment symptoms present: No    Depressive symptoms present: Yes Depressed mood, Feelings of hopelessness , Impaired decision making , Increased irritability/agitation and Sleep disturbance      Eating disorder symptoms present: No    Learning disabilities, cognitive challenges, and/or developmental disorder symptoms present: No     Manic/hypomanic symptoms present: No    Personality and interpersonal functioning difficulties present : Yes: Displaces Blame, Emotional Deregulation and Impaired Impulse Control    Psychosis symptoms present: No      Sleep difficulties present: Yes: Difficulty staying sleep     Substance abuse disorder symptoms present: No     Trauma and stressor related symptoms present: No           Mental Status Exam   Affect: Appropriate   Appearance: Appropriate    Attention Span/Concentration: Attentive?    Eye Contact: Variable   Fund of Knowledge: Appropriate    Language /Speech Content: Fluent   Language /Speech Volume: Normal    Language /Speech Rate/Productions: Normal    Recent Memory: Intact   Remote Memory: Intact   Mood: Normal    Orientation to Person: Yes    Orientation to Place: Yes   Orientation to Time of Day: Yes    Orientation to Date: Yes    Situation (Do they understand why they are here?): Yes    Psychomotor Behavior: Normal    Thought Content: Clear   Thought Form: Intact       Mental Health and Substance Abuse History    History  Current and historical diagnoses or mental health concerns: history of depression, BPD, ADD, PTSD, anorexia and anxiety    Prior  services (inpatient, programmatic care,  "outpatient, etc) : Yes history of [facility] ED visits and medical admissions for intential self harm of ingestion or insertion of objects. Pt currently in outpatient therapy and skills services    History of substance abuse: No    Prior PRASHANTH services (inpatient, programmatic care, detox, outpatient, etc) : No    History of commitment: No    Family history of MH/PRASHANTH: No    Trauma history: Yes history of PTSD    Medication  Psychotropic medications: Yes. Pt is currently taking medications listed in EPIC. Medication compliant: Yes. Recent medication changes: Yes pt reports about one month ago stopping Klonodine     Current Care Team  Primary Care Provider: No    Psychiatrist: Yes. Name: Linda Rj. Location: unknown. Date of last visit: 1 month ago. Frequency: as needed. Perceived helpfulness: helpful/supportive.    Therapist: Yes. Name: Lyndsey. Location: [facility]. Date of last visit: unknown. Frequency: weekly. Perceived helpfulness: helpful/supportive.    : No    CTSS or ARMHS: No    ACT Team: No    Other: Yes. Name: \"medical guardian\" per pts report; [employer]. Location: n/a. Date of last visit: n/a. Frequency: n/a. Perceived helpfulness: n/a.    Release of Information  Was a release of information signed: No. Reason: pt following up with established providers      Biopsychosocial Information    Socioeconomic Information  Current living situation: lives at group home with 3 other residents    Employment/income source: pt works part time at [employer]    Relevant legal issues: none     Cultural, Episcopal, or spiritual influences on mental health care: none reported    Is the patient active in the  or a : No      Relevant Medical Concerns   Patient identifies concerns with completing ADLs? No     Patient can ambulate independently? Yes     Other medical concerns? No     History of concussion or TBI? No        Diagnosis    Major depressive disorder, " Recurrent episode, Mild F33.0   by history   Borderline personality disorder F60.3    By history  Posttraumatic stress disorder F43.10    By history        Therapeutic Intervention  The following therapeutic methodologies were employed when working with the patient: establishing rapport, active listening, assessing dimensions of crisis, identifying additional supports and alternative coping skills, establishing a discharge plan, safety planning, treatment planning and harm reduction. Patient response to intervention: pt was cooperative and engaged with assessment process.      Disposition  Recommended disposition: Individual Therapy, Medication Management, Group Home: return to current group home and Other: DBT therapy services could be considered as option for additonal therapy       Reviewed case and recommendations with attending provider. Attending Name: Dr. Marianela Beach      Attending concurs with disposition: Yes      Patient concurs with disposition: Yes      Guardian concurs with disposition: NA     Final disposition: Individual therapy , Medication management and Group home: pt will discharge back to [facility] when medically cleared .         Clinical Substantiation of Recommendations   Rationale with supporting factors for disposition and diagnosis.     Pt is 31 year old female with history of depression, BPD, ADD, PTSD, anorexia and anxiety. Pt was alert and oriented during assessment. Pt denied substance use, HI, SI, hallucinations and major concerns with sleep or appetite. Pt reports symptoms of depression, anxiety and BPD. Pt reported episode of self harm today and multiple triggers leading to worsening mood and thoughts of self harm. Pt has outpatient therapy and medication management services. Pt denying any thoughts of self harm in the ED. Pt engaged in discussion about safety planning and alternatives to self harm. Pt recommended for outpatient LOC and to return to group home.      Assessment  Details  Patient interview started at: 10:41 PM and completed at: 11:09 PM.    Total duration spent on the patient case in minutes: 2.0 hrs     CPT code(s) utilized: 99450 - Psychotherapy for Crisis - 60 (30-74*) min       Aftercare and Safety Planning  Follow up plans with MH/PRASHANTH services: No      Aftercare plan placed in the AVS and provided to patient: Yes. Given to patient by ED staff     Althea [facility]      Aftercare Plan  If I am feeling unsafe or I am in a crisis, I will:   Contact my established care providers   Call the [place] Suicide Prevention Lifeline: [phone]   Go to the nearest emergency room   Call 115     Warning signs that I or other people might notice when a crisis is developing for me:   -thoughts of physical self harm  - increased depression     Things I am able to do on my own to cope or help me feel better:   -video games  - listen to music     Things that I am able to do with others to cope or help me better:   -spend time with others  -share thoughts and feelings with trusted individual  - ask trusted individual how they solve problems or handle certain emotions     Things I can use or do for distraction:   - puzzles/word searches  -fidgets  -sensory activities/grounding techniques ( see list below for list, website and books)     Changes I can make to support my mental health and wellness:   - continue services with outpatient mental health providers  - create safety plans with outpatient providers and group home staff (scripts are an example; making scripts of how you like to be treated/talked to..)     People in my life that I can ask for help:   - group home staff  - friends  - family  -[facility] crisis workers  - outpatient providers     Your [facility] has a mental health crisis team you can call 24/7: [facility] Mobile Crisis  [phone] (adults)  [phone] (children)    Other things that are important when I m in crisis:   -asking for help as needed  - utilizing safety  plan or alternatives to self harm     Additional resources and information:   1- [facility] can be contacted at #[phone] for help with scheduling outpatient mental health services    2- alternatives to self harm;   website-  https://www.adolescentselfinjuryfoundation.com/fdfuwx-lv-zc-besides-self-harm    List-      Snap a rubber band around your wrist. Find a thick rubber band and put it around your wrist. When you feel the need to cut snap the rubber band until the urge subsides.     Draw on yourself with sharpie/draw pretend wounds where you want to cut. It s a physiological thing. Your mind sees red where you want to cut, and sometimes the urge goes away. Or, instead of cutting you can buy brand new sharpies with semi sharp edges and draw on the undersides of your arms. It can also be fun and artistic.     Take a hot or cold shower. Not luke warm - a temperature different than your body will mimick the  distraction  purposes of self harm.      Brush yourself: take a plastic tipped hair brush and instead of scratching, brush the areas you would normally self harm. Apply pressure but do not break the skin.      Ice yourself: Run an ice cube down wherever you self harm. Do it until it melts away completely. It s a sub for those urges and feelings.      When you want to cut, go outside and distract yourself, or  a hobby. Exercise is a natural way of releasing endorphins, a similar reaction to harming oneself. Good hobbies to  are painting, or writing in a journal. You don t have to be good at it; nobody will be critiquing your work. It s a healthy way to express emotion, which is highly beneficial. Sit and draw, or read a book.      Blast Music: Headphones in, world out. Just get some music, somewhat cheerful preferably, and tune out the world. Embrace music, and embrace yourself. This is another way stop racing thoughts/worry.     For every cut you have, you have to wait one day to cut again. Let s say  "you ve cut 5 times. 5 cuts. Wait 5 days to cut again. See if you can do it. It s an alternative to driving yourself crazy by trying to stop cold turkey. Because, you re not stopping completely. This is also a pact you can make with someone who desires you to stop SI. Or for burns/bruises etc, you can wait until it heals to  X  point before doing it again.       Practice Mindfulness (Meditate): Put on some light music or find a quiet place and try to clear your mind. Take deep breaths and try to control your heart beat. Try to observe everything that is going on around you in the moment.  If you re going to cry, then let the tears flow. This is your personal time to relax and do whatever you want.      Books:     Bodies Under Siege: Self-Mutilation and Body Modification in Culture and Psychiatry By Marcus Boss     A Bright Red Scream: Self-Mutilation and the Language of Pain By Florinda Cardenas     Cutting: Understanding and Overcoming Self-Mutilation By Zay Tyson     The Scarred Soul: Understanding & Ending Self-Inflicted Violence By Kathy Fernández     Secret Scars: Uncovering and Understanding the Addiction of Self-Injury By THIAGO Ennis     Self Injury: Psychotherapy with People Who Engage in Self-Inflicted Violence By Royce Beauchamp     Stopping the Pain: A Workbook for Teens Who Cut & Self-Injure     Crisis Lines  Crisis Text Line  Text 215017  You will be connected with a trained live crisis counselor to provide support.    National Hope Line  1.800.SUICIDE [6414240]      Community Resources  Fast Tracker  Linking people to mental health and substance use disorder resources  fasttrackermn.org     [place] Mental Health Warm Line  Peer to peer support  Monday thru Saturday, 12 pm to 10 pm  101.257.9948 or 8.297.260.4552  Text \"Support\" to 58894    National [place] on Mental Illness (ELIJAH)  961.478.2306 or 1.888.ELIJAH.HELPS        Mental Health Apps  My3  " https://my3app.org/    VirtualHopeBox  https://[employer]form[employer]/apps/virtual-hope-box/

## 2022-01-30 NOTE — ED NOTES
Pt again had call light on.  Gustavo MORALES answered call light.  Pt again wanting to talk to this nurse but this nurse was in with another ambulance that had arrived.  Gustavo MORALES was about to leave room when pt stopped him and wanted him to help her with pain medicine.  Gustavo MORALES told pt that he did not feel comfortable being in the room alone with her due to her previous unwillingness to talk with a male.

## 2022-01-30 NOTE — DISCHARGE INSTRUCTIONS
DEC Aftercare Plan  If I am feeling unsafe or I am in a crisis, I will:   Contact my established care providers   Call the National Suicide Prevention Lifeline: 916.417.4973   Go to the nearest emergency room   Call 911     Warning signs that I or other people might notice when a crisis is developing for me:   -thoughts of physical self harm  - increased depression     Things I am able to do on my own to cope or help me feel better:   -video games  - listen to music     Things that I am able to do with others to cope or help me better:   -spend time with others  -share thoughts and feelings with trusted individual  - ask trusted individual how they solve problems or handle certain emotions     Things I can use or do for distraction:   - puzzles/word searches  -fidgets  -sensory activities/grounding techniques ( see list below for list, website and books)     Changes I can make to support my mental health and wellness:   - continue services with outpatient mental health providers  - create safety plans with outpatient providers and group home staff (scripts are an example; making scripts of how you like to be treated/talked to..)     People in my life that I can ask for help:   - group home staff  - friends  - family  -[facility] crisis workers  - outpatient providers     Your [facility] has a mental health crisis team you can call 24/7: [facility] Mobile Crisis  [phone] (adults)  [phone] (children)    Other things that are important when I m in crisis:   -asking for help as needed  - utilizing safety plan or alternatives to self harm     Additional resources and information:   1- [facility] can be contacted at #[phone] for help with scheduling outpatient mental health services    2- alternatives to self harm;   website-  https://www.adolescentselfinjuryfoundation.com/ggwwao-yy-qy-besides-self-harm    List-      Snap a rubber band around your wrist. Find a thick rubber band and put it around your wrist.  When you feel the need to cut snap the rubber band until the urge subsides.     Draw on yourself with sharpie/draw pretend wounds where you want to cut. It s a physiological thing. Your mind sees red where you want to cut, and sometimes the urge goes away. Or, instead of cutting you can buy brand new sharpies with semi sharp edges and draw on the undersides of your arms. It can also be fun and artistic.     Take a hot or cold shower. Not luke warm - a temperature different than your body will mimick the  distraction  purposes of self harm.      Brush yourself: take a plastic tipped hair brush and instead of scratching, brush the areas you would normally self harm. Apply pressure but do not break the skin.      Ice yourself: Run an ice cube down wherever you self harm. Do it until it melts away completely. It s a sub for those urges and feelings.      When you want to cut, go outside and distract yourself, or  a hobby. Exercise is a natural way of releasing endorphins, a similar reaction to harming oneself. Good hobbies to  are painting, or writing in a journal. You don t have to be good at it; nobody will be critiquing your work. It s a healthy way to express emotion, which is highly beneficial. Sit and draw, or read a book.      Blast Music: Headphones in, world out. Just get some music, somewhat cheerful preferably, and tune out the world. Embrace music, and embrace yourself. This is another way stop racing thoughts/worry.     For every cut you have, you have to wait one day to cut again. Let s say you ve cut 5 times. 5 cuts. Wait 5 days to cut again. See if you can do it. It s an alternative to driving yourself crazy by trying to stop cold turkey. Because, you re not stopping completely. This is also a pact you can make with someone who desires you to stop SI. Or for burns/bruises etc, you can wait until it heals to  X  point before doing it again.       Practice Mindfulness (Meditate): Put on some  "light music or find a quiet place and try to clear your mind. Take deep breaths and try to control your heart beat. Try to observe everything that is going on around you in the moment.  If you re going to cry, then let the tears flow. This is your personal time to relax and do whatever you want.      Books:     Bodies Under Siege: Self-Mutilation and Body Modification in Culture and Psychiatry By Marcus Boss     A Bright Red Scream: Self-Mutilation and the Language of Pain By Florinda Cardenas     Cutting: Understanding and Overcoming Self-Mutilation By Zay Tyson     The Scarred Soul: Understanding & Ending Self-Inflicted Violence By Kathy Fernández     Secret Scars: Uncovering and Understanding the Addiction of Self-Injury By THIAGO Ennis     Self Injury: Psychotherapy with People Who Engage in Self-Inflicted Violence By Royce Beauchamp     Stopping the Pain: A Workbook for Teens Who Cut & Self-Injure     Crisis Lines  Crisis Text Line  Text 805211  You will be connected with a trained live crisis counselor to provide support.    National Hope Line  1.800.SUICIDE [6384219]      Community Resources  Fast Tracker  Linking people to mental health and substance use disorder resources  fasttrack"[employer]n.org     [place] Mental Health Warm Line  Peer to peer support  Monday thru Saturday, 12 pm to 10 pm  630.948.7820 or 7.143.804.1844  Text \"Support\" to 04360    National [place] on Mental Illness (ELIJAH)  689.242.2704 or 1.888.ELIJAH.HELPS        Mental Health Apps  My3  https://my3app.org/    VirtualHopeBox  [email or web address].org/apps/virtual-hope-box/    "
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
## 2022-01-30 NOTE — PLAN OF CARE
Received phone call from [facility] that saw pt in ED.  They wanted to forward to the  seeing pt in [facility] dept.  Left following phone number: [phone]. Call if needed.

## 2022-01-30 NOTE — ED TRIAGE NOTES
She states she swallowed a paper clip that she had opened into a straight line. She has done this in the past. She says she was trying to harm herself. She states she was not trying to kill herself.

## 2022-01-30 NOTE — ANESTHESIA CARE TRANSFER NOTE
Patient: Nevin Alvarado    Procedure: Procedure(s):  ESOPHAGOGASTRODUODENOSCOPY (EGD) FOREIGN BODY REMOVAL       Diagnosis: Swallowed foreign body, initial encounter [T18.9XXA]  Diagnosis Additional Information: No value filed.    Anesthesia Type:   General     Note:    Oropharynx: spontaneously breathing  Level of Consciousness: drowsy  Oxygen Supplementation: face mask    Independent Airway: airway patency satisfactory and stable  Dentition: dentition unchanged  Vital Signs Stable: post-procedure vital signs reviewed and stable  Report to RN Given: handoff report given  Patient transferred to: PACU  Comments: Pt. [place] and breathing spontaneously.  Vitals stable.  Report given to oncoming nurse.  Transfer of care occurred.   Handoff Report: Identifed the Patient, Identified the Reponsible Provider, Reviewed the pertinent medical history, Discussed the surgical course, Reviewed Intra-OP anesthesia mangement and issues during anesthesia, Set expectations for post-procedure period and Allowed opportunity for questions and acknowledgement of understanding      Vitals:  Vitals Value Taken Time   /82 01/30/22 0902   Temp 97.1    Pulse 116 01/30/22 0902   Resp 21 01/30/22 0902   SpO2 100 % 01/30/22 0902   Vitals shown include unvalidated device data.    Electronically Signed By: HU Sanchez CRNA  January 30, 2022  9:03 AM

## 2022-02-03 ENCOUNTER — APPOINTMENT (OUTPATIENT)
Dept: RADIOLOGY | Facility: HOSPITAL | Age: 32
End: 2022-02-03
Attending: EMERGENCY MEDICINE
Payer: COMMERCIAL

## 2022-02-03 ENCOUNTER — HOSPITAL ENCOUNTER (OUTPATIENT)
Facility: HOSPITAL | Age: 32
End: 2022-02-03
Attending: INTERNAL MEDICINE | Admitting: INTERNAL MEDICINE
Payer: COMMERCIAL

## 2022-02-03 ENCOUNTER — ANESTHESIA EVENT (OUTPATIENT)
Dept: SURGERY | Facility: HOSPITAL | Age: 32
End: 2022-02-03
Payer: COMMERCIAL

## 2022-02-03 ENCOUNTER — HOSPITAL ENCOUNTER (OUTPATIENT)
Facility: HOSPITAL | Age: 32
Discharge: HOME | End: 2022-02-03
Attending: EMERGENCY MEDICINE | Admitting: INTERNAL MEDICINE
Payer: COMMERCIAL

## 2022-02-03 ENCOUNTER — ANESTHESIA (OUTPATIENT)
Dept: SURGERY | Facility: HOSPITAL | Age: 32
End: 2022-02-03
Payer: COMMERCIAL

## 2022-02-03 VITALS
HEART RATE: 106 BPM | SYSTOLIC BLOOD PRESSURE: 144 MMHG | RESPIRATION RATE: 16 BRPM | WEIGHT: 293 LBS | BODY MASS INDEX: 53.92 KG/M2 | DIASTOLIC BLOOD PRESSURE: 88 MMHG | OXYGEN SATURATION: 95 % | TEMPERATURE: 99 F | HEIGHT: 62 IN

## 2022-02-03 DIAGNOSIS — T18.9XXA SWALLOWED FOREIGN BODY, INITIAL ENCOUNTER: ICD-10-CM

## 2022-02-03 LAB
HCG SERPL-ACNC: <2 MLU/ML (ref 0–4)
SARS-COV-2 RNA RESP QL NAA+PROBE: NEGATIVE
UPPER GI ENDOSCOPY: NORMAL

## 2022-02-03 PROCEDURE — 710N000012 HC RECOVERY PHASE 2, PER MINUTE: Performed by: INTERNAL MEDICINE

## 2022-02-03 PROCEDURE — 250N000009 HC RX 250: Performed by: NURSE ANESTHETIST, CERTIFIED REGISTERED

## 2022-02-03 PROCEDURE — 36415 COLL VENOUS BLD VENIPUNCTURE: CPT | Performed by: EMERGENCY MEDICINE

## 2022-02-03 PROCEDURE — 74018 RADEX ABDOMEN 1 VIEW: CPT

## 2022-02-03 PROCEDURE — 250N000011 HC RX IP 250 OP 636: Performed by: EMERGENCY MEDICINE

## 2022-02-03 PROCEDURE — 370N000017 HC ANESTHESIA TECHNICAL FEE, PER MIN: Performed by: INTERNAL MEDICINE

## 2022-02-03 PROCEDURE — 250N000013 HC RX MED GY IP 250 OP 250 PS 637: Performed by: ANESTHESIOLOGY

## 2022-02-03 PROCEDURE — 84702 CHORIONIC GONADOTROPIN TEST: CPT | Performed by: EMERGENCY MEDICINE

## 2022-02-03 PROCEDURE — 250N000011 HC RX IP 250 OP 636: Performed by: NURSE ANESTHETIST, CERTIFIED REGISTERED

## 2022-02-03 PROCEDURE — 999N000141 HC STATISTIC PRE-PROCEDURE NURSING ASSESSMENT: Performed by: INTERNAL MEDICINE

## 2022-02-03 PROCEDURE — C9803 HOPD COVID-19 SPEC COLLECT: HCPCS

## 2022-02-03 PROCEDURE — 360N000075 HC SURGERY LEVEL 2, PER MIN: Performed by: INTERNAL MEDICINE

## 2022-02-03 PROCEDURE — 272N000001 HC OR GENERAL SUPPLY STERILE: Performed by: INTERNAL MEDICINE

## 2022-02-03 PROCEDURE — 258N000003 HC RX IP 258 OP 636: Performed by: NURSE ANESTHETIST, CERTIFIED REGISTERED

## 2022-02-03 PROCEDURE — 250N000025 HC SEVOFLURANE, PER MIN: Performed by: INTERNAL MEDICINE

## 2022-02-03 PROCEDURE — 710N000009 HC RECOVERY PHASE 1, LEVEL 1, PER MIN: Performed by: INTERNAL MEDICINE

## 2022-02-03 PROCEDURE — 71046 X-RAY EXAM CHEST 2 VIEWS: CPT

## 2022-02-03 PROCEDURE — 99285 EMERGENCY DEPT VISIT HI MDM: CPT | Mod: 25

## 2022-02-03 PROCEDURE — 87635 SARS-COV-2 COVID-19 AMP PRB: CPT | Performed by: EMERGENCY MEDICINE

## 2022-02-03 PROCEDURE — 96374 THER/PROPH/DIAG INJ IV PUSH: CPT

## 2022-02-03 RX ORDER — NALOXONE HYDROCHLORIDE 0.4 MG/ML
0.4 INJECTION, SOLUTION INTRAMUSCULAR; INTRAVENOUS; SUBCUTANEOUS
Status: CANCELLED | OUTPATIENT
Start: 2022-02-03

## 2022-02-03 RX ORDER — KETAMINE HYDROCHLORIDE 50 MG/ML
INJECTION, SOLUTION INTRAMUSCULAR; INTRAVENOUS PRN
Status: DISCONTINUED | OUTPATIENT
Start: 2022-02-03 | End: 2022-02-03

## 2022-02-03 RX ORDER — NALOXONE HYDROCHLORIDE 0.4 MG/ML
0.2 INJECTION, SOLUTION INTRAMUSCULAR; INTRAVENOUS; SUBCUTANEOUS
Status: CANCELLED | OUTPATIENT
Start: 2022-02-03

## 2022-02-03 RX ORDER — SODIUM CHLORIDE, SODIUM LACTATE, POTASSIUM CHLORIDE, CALCIUM CHLORIDE 600; 310; 30; 20 MG/100ML; MG/100ML; MG/100ML; MG/100ML
INJECTION, SOLUTION INTRAVENOUS CONTINUOUS
Status: DISCONTINUED | OUTPATIENT
Start: 2022-02-03 | End: 2022-02-03 | Stop reason: HOSPADM

## 2022-02-03 RX ORDER — DEXAMETHASONE SODIUM PHOSPHATE 4 MG/ML
INJECTION, SOLUTION INTRA-ARTICULAR; INTRALESIONAL; INTRAMUSCULAR; INTRAVENOUS; SOFT TISSUE PRN
Status: DISCONTINUED | OUTPATIENT
Start: 2022-02-03 | End: 2022-02-03

## 2022-02-03 RX ORDER — KETOROLAC TROMETHAMINE 30 MG/ML
INJECTION, SOLUTION INTRAMUSCULAR; INTRAVENOUS PRN
Status: DISCONTINUED | OUTPATIENT
Start: 2022-02-03 | End: 2022-02-03

## 2022-02-03 RX ORDER — FENTANYL CITRATE 50 UG/ML
75 INJECTION, SOLUTION INTRAMUSCULAR; INTRAVENOUS ONCE
Status: COMPLETED | OUTPATIENT
Start: 2022-02-03 | End: 2022-02-03

## 2022-02-03 RX ORDER — SODIUM CHLORIDE, SODIUM LACTATE, POTASSIUM CHLORIDE, CALCIUM CHLORIDE 600; 310; 30; 20 MG/100ML; MG/100ML; MG/100ML; MG/100ML
INJECTION, SOLUTION INTRAVENOUS CONTINUOUS PRN
Status: DISCONTINUED | OUTPATIENT
Start: 2022-02-03 | End: 2022-02-03

## 2022-02-03 RX ORDER — PROPOFOL 10 MG/ML
INJECTION, EMULSION INTRAVENOUS PRN
Status: DISCONTINUED | OUTPATIENT
Start: 2022-02-03 | End: 2022-02-03

## 2022-02-03 RX ORDER — ONDANSETRON 2 MG/ML
INJECTION INTRAMUSCULAR; INTRAVENOUS PRN
Status: DISCONTINUED | OUTPATIENT
Start: 2022-02-03 | End: 2022-02-03

## 2022-02-03 RX ORDER — LIDOCAINE 40 MG/G
CREAM TOPICAL
Status: DISCONTINUED | OUTPATIENT
Start: 2022-02-03 | End: 2022-02-03 | Stop reason: HOSPADM

## 2022-02-03 RX ORDER — ONDANSETRON 4 MG/1
4 TABLET, ORALLY DISINTEGRATING ORAL EVERY 30 MIN PRN
Status: DISCONTINUED | OUTPATIENT
Start: 2022-02-03 | End: 2022-02-03 | Stop reason: HOSPADM

## 2022-02-03 RX ORDER — ONDANSETRON 2 MG/ML
4 INJECTION INTRAMUSCULAR; INTRAVENOUS EVERY 30 MIN PRN
Status: DISCONTINUED | OUTPATIENT
Start: 2022-02-03 | End: 2022-02-03 | Stop reason: HOSPADM

## 2022-02-03 RX ORDER — FENTANYL CITRATE 50 UG/ML
50 INJECTION, SOLUTION INTRAMUSCULAR; INTRAVENOUS ONCE
Status: DISCONTINUED | OUTPATIENT
Start: 2022-02-03 | End: 2022-02-03

## 2022-02-03 RX ORDER — FLUMAZENIL 0.1 MG/ML
0.2 INJECTION, SOLUTION INTRAVENOUS
Status: CANCELLED | OUTPATIENT
Start: 2022-02-03 | End: 2022-02-04

## 2022-02-03 RX ORDER — LIDOCAINE HYDROCHLORIDE 20 MG/ML
INJECTION, SOLUTION INFILTRATION; PERINEURAL PRN
Status: DISCONTINUED | OUTPATIENT
Start: 2022-02-03 | End: 2022-02-03

## 2022-02-03 RX ORDER — ACETAMINOPHEN 325 MG/1
975 TABLET ORAL ONCE
Status: COMPLETED | OUTPATIENT
Start: 2022-02-03 | End: 2022-02-03

## 2022-02-03 RX ADMIN — PROPOFOL 200 MG: 10 INJECTION, EMULSION INTRAVENOUS at 19:30

## 2022-02-03 RX ADMIN — PROPOFOL 10 MG: 10 INJECTION, EMULSION INTRAVENOUS at 19:23

## 2022-02-03 RX ADMIN — LIDOCAINE HYDROCHLORIDE 60 MG: 20 INJECTION, SOLUTION INFILTRATION; PERINEURAL at 19:30

## 2022-02-03 RX ADMIN — KETAMINE HYDROCHLORIDE 50 MG: 50 INJECTION, SOLUTION INTRAMUSCULAR; INTRAVENOUS at 19:30

## 2022-02-03 RX ADMIN — ONDANSETRON 4 MG: 2 INJECTION INTRAMUSCULAR; INTRAVENOUS at 19:15

## 2022-02-03 RX ADMIN — KETOROLAC TROMETHAMINE 30 MG: 30 INJECTION, SOLUTION INTRAMUSCULAR at 19:51

## 2022-02-03 RX ADMIN — SUCCINYLCHOLINE CHLORIDE 100 MG: 20 INJECTION, SOLUTION INTRAMUSCULAR; INTRAVENOUS at 19:30

## 2022-02-03 RX ADMIN — FENTANYL CITRATE 75 MCG: 50 INJECTION, SOLUTION INTRAMUSCULAR; INTRAVENOUS at 18:22

## 2022-02-03 RX ADMIN — DEXAMETHASONE SODIUM PHOSPHATE 10 MG: 4 INJECTION, SOLUTION INTRA-ARTICULAR; INTRALESIONAL; INTRAMUSCULAR; INTRAVENOUS; SOFT TISSUE at 19:34

## 2022-02-03 RX ADMIN — ACETAMINOPHEN 975 MG: 325 TABLET ORAL at 20:18

## 2022-02-03 RX ADMIN — SODIUM CHLORIDE, POTASSIUM CHLORIDE, SODIUM LACTATE AND CALCIUM CHLORIDE: 600; 310; 30; 20 INJECTION, SOLUTION INTRAVENOUS at 19:11

## 2022-02-03 RX ADMIN — LIDOCAINE HYDROCHLORIDE 60 MG: 20 INJECTION, SOLUTION INFILTRATION; PERINEURAL at 19:23

## 2022-02-03 ASSESSMENT — MIFFLIN-ST. JEOR: SCORE: 2024.97

## 2022-02-03 NOTE — ED TRIAGE NOTES
"Pt presents after swallowing a \"looked like a bread tie.\" Pt lives in a group home and has been having difficulty with the  and other staff. Pt states that this is a behavior she has and she has done this in the past. Last time being last week with a paper clip that had to remove through endoscopy.  "

## 2022-02-03 NOTE — ED PROVIDER NOTES
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 N/A 10/3/2019    Procedure: ESOPHAGOGASTRODUODENOSCOPY (EGD), REMOVAL OF FOREIGN BODY;  Surgeon: Chris Lira MD;  Location: [facility] OR;  Service: Gastroenterology     MO ESOPHAGOGASTRODUODENOSCOPY TRANSORAL DIAGNOSTIC N/A 10/6/2019    Procedure: ESOPHAGOGASTRODUODENOSCOPY (EGD) with attempted foreign body removal;  Surgeon: Felipe Connolly MD;  Location: [facility];  Service: Gastroenterology     MO ESOPHAGOGASTRODUODENOSCOPY TRANSORAL DIAGNOSTIC N/A 12/15/2019    Procedure: ESOPHAGOGASTRODUODENOSCOPY (EGD) with foreign body removal;  Surgeon: Jeffy Zuñiga MD;  Location: [facility];  Service: Gastroenterology     MO ESOPHAGOGASTRODUODENOSCOPY TRANSORAL DIAGNOSTIC N/A 12/17/2019    Procedure: ESOPHAGOGASTRODUODENOSCOPY (EGD) with attempted foreign body removal;   Surgeon: Felipe Connolly MD;  Location: [facility];  Service: Gastroenterology     [facility] ESOPHAGOGASTRODUODENOSCOPY TRANSORAL DIAGNOSTIC N/A 12/21/2019    Procedure: ESOPHAGOGASTRODUODENOSCOPY (EGD) FOR FROEIGN BODY REMOVAL;  Surgeon: Binu Vigil MD;  Location: [facility];  Service: Gastroenterology     VA ESOPHAGOGASTRODUODENOSCOPY TRANSORAL DIAGNOSTIC N/A 7/22/2020    Procedure: ESOPHAGOGASTRODUODENOSCOPY (EGD);  Surgeon: Bailey Arnold MD;  Location: [facility];  Service: Gastroenterology     VA ESOPHAGOGASTRODUODENOSCOPY TRANSORAL DIAGNOSTIC N/A 8/14/2020    Procedure: ESOPHAGOGASTRODUODENOSCOPY (EGD) FOREIGN BODY REMOVAL;  Surgeon: Jeffy Zuñiga MD;  Location: [facility];  Service: Gastroenterology     VA ESOPHAGOGASTRODUODENOSCOPY TRANSORAL DIAGNOSTIC N/A 2/25/2021    Procedure: ESOPHAGOGASTRODUODENOSCOPY (EGD) with foreign body reoval;  Surgeon: Bird Sethi MD;  Location: [facility];  Service: Gastroenterology     VA ESOPHAGOGASTRODUODENOSCOPY TRANSORAL DIAGNOSTIC N/A 4/19/2021    Procedure: ESOPHAGOGASTRODUODENOSCOPY (EGD);  Surgeon: Libia Rose MD;  Location: [facility];  Service: Gastroenterology     VA SURG DIAGNOSTIC EXAM, ANORECTAL N/A 2/5/2020    Procedure: EXAM UNDER ANESTHESIA, Flexible Sigmoidoscopy, Retrieval of Foreign Body;  Surgeon: Sasha Ivan MD;  Location: [facility];  Service: General     RELEASE CARPAL TUNNEL Bilateral      RELEASE CARPAL TUNNEL Bilateral      REMOVAL, FOREIGN BODY, RECTUM N/A 7/21/2021    Procedure: MANUAL RETREIVALOF FOREIGN OBJECT- RECTUM.;  Surgeon: Aleksandra Gerber MD;  [place] [facility] [place]     SIGMOIDOSCOPY FLEXIBLE N/A 1/10/2017    Procedure: SIGMOIDOSCOPY FLEXIBLE;  Surgeon: Annmarie Haynes MD;  Location:  OR     SIGMOIDOSCOPY FLEXIBLE N/A 5/8/2018    Procedure: SIGMOIDOSCOPY FLEXIBLE;  flex sig with foreign body removal using snare and rattooth forcep;  Surgeon:  Soham Cano MD;  Location:  GI     SIGMOIDOSCOPY FLEXIBLE N/A 2/20/2019    Procedure: Exam under anesthesia Colonoscopy with attempted  removal of rectal foreign body;  Surgeon: Estrada Chávez MD;  Location:  OR     Social Histrory  Smoking:  Alcohol Use:  Allergies   Allergen Reactions     Amoxicillin-Pot Clavulanate Other (See Comments), Swelling and Rash     PN: facial swelling, left side. Also had cortisone injection the same day as she started the Augmentin.  Noted in downtime recovery of chart.    PN: facial swelling, left side. Also had cortisone injection the same day as she started the Augmentin.; HUT Comment: PN: facial swelling, left side. Also had cortisone injection the same day as she started the Augmentin.Noted in downtime recovery of chart.; HUT Reaction: Rash; HUT Reaction: Unknown; HUT Reaction Type: Allergy; HUT Severity: Med; HUT Noted: [id number]     Oseltamivir Hives     med stopped, PN: med stopped  med stopped, PN: med stopped; HUT Comment: med stopped, PN: med stopped; HUT Reaction: Hives; HUT Reaction Type: Allergy; HUT Severity: Med; HUT Noted: [id number]     Penicillins Anaphylaxis     HUT Reaction: Anaphylaxis; HUT Reaction Type: Allergy; HUT Severity: High; HUT Noted: [id number]     Vancomycin Itching, Swelling and Rash     Other reaction(s): Redness  Flushed face, very itchy; HUT Comment: Flushed face, very itchy; HUT Reaction: Angioedema; HUT Reaction: Redness; HUT Severity: Med; HUT Noted: [id number]    facial     Hydrocodone Nausea and Vomiting and GI Disturbance     vomiting for days, PN: vomiting for days; HUT Comment: vomiting for days; HUT Reaction: Gastrointestinal; HUT Reaction: Nausea And Vomiting; HUT Reaction Type: Intolerance; HUT Severity: Med; HUT Noted: [id number]  vomiting for days       Acetaminophen Hives     Blood-Group Specific Substance Other (See Comments)     Patient has an anti-Cw and non-specific antibodies. Blood product orders may be delayed. Draw one red  top and two purple top tubes for all type/screen/crossmatch orders.  Patient has anti-Cw, anti-K (Angella), Warm auto and nonspecific antibodies. Blood products may be delayed. Draw patient 24 hours prior to transfusion. Draw one red top and two purple top tubes for all type and screen orders.     Clavulanic Acid Angioedema     Fentanyl Itching     Naltrexone Other (See Comments)     Reaction(s): Vivid dreams.  Reaction(s): Vivid dreams.       Oxycodone Swelling     Adhesive Tape Rash     Silicone type  Silicone type     Cephalosporins Rash     Influenza Vaccines Other (See Comments) and Nausea and Vomiting     HUT Reaction: Nausea And Vomiting; HUT Reaction Type: Intolerance; HUT Severity: Low; HUT Noted: [id number]     Lamotrigine Rash     Possibly associated with Lamictal.   HUT Comment: Possibly associated with Lamictal. ; HUT Reaction: Rash; HUT Reaction Type: Allergy; HUT Severity: Low; HUT Noted: [id number]     Latex Rash     HUT Reaction: Rash; HUT Reaction Type: Allergy; HUT Severity: Low; HUT Noted: [id number]         OUTPATIENT MEDICATIONS     Discharge Medication List as of 2/3/2022  8:14 PM         Vitals:    02/03/22 2023 02/03/22 2027 02/03/22 2039 02/03/22 2045   BP: (!) 148/95  134/73 (!) 144/88   Pulse: 113 112 106    Resp: 17  16    Temp:  98.7  F (37.1  C) 99  F (37.2  C)    TempSrc:  Temporal Temporal    SpO2: 95% 95% 95%    Weight:       Height:           Physical Exam   Constitutional: Oriented to person, place, and time. Appears well-developed and well-nourished.   HEENT:    Head: Atraumatic.   Neck: Normal range of motion. Neck supple.   Cardiovascular: Normal rate, regular rhythm and normal heart sounds.    Pulmonary/Chest: Normal effort  and breath sounds normal.   Abdominal: Soft. Bowel sounds are normal. Left upper quadrant pain with no guarding or peritoneal signs.   Musculoskeletal: Normal range of motion.   Neurological: Alert and oriented to person, place, and time. Normal strength.No sensory  deficit. No cranial nerve deficit . Skin: Skin is warm and dry.   Psychiatric: Normal mood and affect. Behavior is normal. Thought content normal.       DIAGNOSTICS    LABORATORY FINDINGS (REVIEWED AND INTERPRETED):  Labs Ordered and Resulted from Time of ED Arrival to Time of ED Departure   COVID-19 VIRUS (CORONAVIRUS) BY PCR - Normal       Result Value    SARS CoV2 PCR Negative           IMAGING (REVIEWED AND INTERPRETED):  Abdomen XR 1 vw   Final Result   IMPRESSION: No acute chest findings. No radiodense foreign body is identified within the chest. The lungs are clear and there are no pleural effusions. Normal heart size.      Within the mid abdomen there is a 1.2 cm thin linear radiodense foreign body likely correlating with the patient's history of a swallowed bag tie. Nonobstructive bowel gas pattern. No definite free air.      Chest XR,  PA & LAT   Final Result   IMPRESSION: No acute chest findings. No radiodense foreign body is identified within the chest. The lungs are clear and there are no pleural effusions. Normal heart size.      Within the mid abdomen there is a 1.2 cm thin linear radiodense foreign body likely correlating with the patient's history of a swallowed bag tie. Nonobstructive bowel gas pattern. No definite free air.            I, Rhina Francois, am serving as a scribe to document services personally performed by Bird Melchor D.O., based on my observation and the provider s statements to me.    IBird D.O., attest that Rhina Francois is acting in a scribe capacity, has observed my performance of the services and has documented them in accordance with my direction.    Bird Melchor D.O.  EMERGENCY MEDICINE   02/03/22  [facility] EMERGENCY DEPARTMENT  [address] [place] [id number]  [phone]  Dept: [phone]     Bird Melchor DO  02/03/22 1967

## 2022-02-03 NOTE — ED NOTES
Bed: JNED-07  Expected date: 2/3/22  Expected time: 4:33 PM  Means of arrival:   Comments:  JAIME TEE JF

## 2022-02-04 NOTE — ANESTHESIA POSTPROCEDURE EVALUATION
Patient: Nevin Alvarado    Procedure: Procedure(s):  ESOPHAGOGASTRODUODENOSCOPY (EGD), FOREIGN BODY REMOVAL       Diagnosis:Swallowed foreign body, initial encounter [T18.9XXA]  Diagnosis Additional Information: No value filed.    Anesthesia Type:  General    Note:  Disposition: Outpatient   Postop Pain Control: Uneventful            Sign Out: Well controlled pain   PONV: No   Neuro/Psych: Uneventful            Sign Out: Acceptable/Baseline neuro status   Airway/Respiratory: Uneventful            Sign Out: Acceptable/Baseline resp. status   CV/Hemodynamics: Uneventful            Sign Out: Acceptable CV status; No obvious hypovolemia; No obvious fluid overload   Other NRE: NONE   DID A NON-ROUTINE EVENT OCCUR? No           Last vitals:  Vitals Value Taken Time   /95 02/03/22 2024   Temp 37.1  C (98.7  F) 02/03/22 2027   Pulse 111 02/03/22 2028   Resp 17 02/03/22 2023   SpO2 94 % 02/03/22 2028   Vitals shown include unvalidated device data.    Electronically Signed By: Fran Neves MD  February 3, 2022  9:58 PM

## 2022-02-04 NOTE — PRE-PROCEDURE
GENERAL PRE-PROCEDURE:   Procedure:  Esophagogastroduodenoscopy   Date/Time:  2/3/2022 6:59 PM    Verbal consent obtained?: Yes    Written consent obtained?: Yes    Risks and benefits: Risks, benefits and alternatives were discussed    Consent given by:  Patient  Patient states understanding of procedure being performed: Yes    Patient's understanding of procedure matches consent: Yes    Procedure consent matches procedure scheduled: Yes    Expected level of sedation:  Moderate  Appropriately NPO:  Yes  Mallampati  :  Grade 2- soft palate, base of uvula, tonsillar pillars, and portion of posterior pharyngeal wall visible  Lungs:  Lungs clear with good breath sounds bilaterally  Heart:  Normal heart sounds and rate  History & Physical reviewed:  History and physical reviewed and no updates needed  Statement of review:  I have reviewed the lab findings, diagnostic data, medications, and the plan for sedation
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
## 2022-02-04 NOTE — DISCHARGE INSTRUCTIONS
You will be discharged to the paresthesia waiting area for foreign body removal by Dr. Vigil.  After procedure you will be discharged home.  If you have any abdominal pain that persists or is not improved with Tylenol, develop vomiting, chest pain, difficulty breathing or fever return the emergency department.  Continue to follow-up with your primary care physician and psychiatrist as scheduled otherwise.
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
## 2022-02-04 NOTE — ANESTHESIA CARE TRANSFER NOTE
Patient: Nevin Alvarado    Procedure: Procedure(s):  ESOPHAGOGASTRODUODENOSCOPY (EGD), FOREIGN BODY REMOVAL       Diagnosis: Swallowed foreign body, initial encounter [T18.9XXA]  Diagnosis Additional Information: No value filed.    Anesthesia Type:   General     Note:    Oropharynx: oropharynx clear of all foreign objects and spontaneously breathing  Level of Consciousness: drowsy    Level of Supplemental Oxygen (L/min / FiO2): 6  Independent Airway: airway patency satisfactory and stable  Dentition: dentition unchanged  Vital Signs Stable: post-procedure vital signs reviewed and stable  Report to RN Given: handoff report given  Patient transferred to: PACU    Handoff Report: Identifed the Patient, Identified the Reponsible Provider, Reviewed the pertinent medical history, Discussed the surgical course, Reviewed Intra-OP anesthesia mangement and issues during anesthesia, Set expectations for post-procedure period and Allowed opportunity for questions and acknowledgement of understanding      Vitals:  Vitals Value Taken Time   /80 02/03/22 2000   Temp 36.9  C (98.5  F) 02/03/22 1957   Pulse 109 02/03/22 1959   Resp 13 02/03/22 1959   SpO2 100 % 02/03/22 1959   Vitals shown include unvalidated device data.    Electronically Signed By: HU Rodrigues CRNA  February 3, 2022  8:01 PM

## 2022-02-04 NOTE — ANESTHESIA PROCEDURE NOTES
Airway       Patient location during procedure: OR       Procedure Start/Stop Times: 2/3/2022 7:32 PM  Staff -        Anesthesiologist:  Fran Neves MD       CRNA: Hever Woodruff APRN CRNA       Performed By: CRNA  Consent for Airway        Urgency: elective  Indications and Patient Condition       Indications for airway management: isma-procedural       Induction type:RSI       Mask difficulty assessment: 1 - vent by mask    Final Airway Details       Final airway type: endotracheal airway       Successful airway: ETT - single  Endotracheal Airway Details        ETT size (mm): 7.0       Cuffed: yes       Successful intubation technique: video laryngoscopy       VL Blade Size: Glidescope 3       Grade View of Cords: 1       Adjucts: stylet       Position: Right       Measured from: lips       Secured at (cm): 22    Post intubation assessment        Placement verified by: capnometry, equal breath sounds and chest rise        Number of attempts at approach: 1       Number of other approaches attempted: 0       Secured with: silk tape       Ease of procedure: easy       Dentition: Intact and Unchanged

## 2022-02-07 ENCOUNTER — ANESTHESIA (OUTPATIENT)
Dept: SURGERY | Facility: CLINIC | Age: 32
End: 2022-02-07
Payer: COMMERCIAL

## 2022-02-07 ENCOUNTER — HOSPITAL ENCOUNTER (OUTPATIENT)
Facility: CLINIC | Age: 32
Discharge: NURSING, REHAB OR LONG-TERM CARE | End: 2022-02-07
Attending: EMERGENCY MEDICINE | Admitting: INTERNAL MEDICINE
Payer: COMMERCIAL

## 2022-02-07 ENCOUNTER — ANESTHESIA EVENT (OUTPATIENT)
Dept: SURGERY | Facility: CLINIC | Age: 32
End: 2022-02-07
Payer: COMMERCIAL

## 2022-02-07 ENCOUNTER — APPOINTMENT (OUTPATIENT)
Dept: RADIOLOGY | Facility: CLINIC | Age: 32
End: 2022-02-07
Attending: EMERGENCY MEDICINE
Payer: COMMERCIAL

## 2022-02-07 ENCOUNTER — HOSPITAL ENCOUNTER (OUTPATIENT)
Facility: CLINIC | Age: 32
End: 2022-02-07
Attending: INTERNAL MEDICINE | Admitting: INTERNAL MEDICINE
Payer: COMMERCIAL

## 2022-02-07 VITALS
BODY MASS INDEX: 54.5 KG/M2 | SYSTOLIC BLOOD PRESSURE: 149 MMHG | TEMPERATURE: 97.1 F | DIASTOLIC BLOOD PRESSURE: 79 MMHG | RESPIRATION RATE: 18 BRPM | OXYGEN SATURATION: 96 % | WEIGHT: 293 LBS | HEART RATE: 92 BPM

## 2022-02-07 DIAGNOSIS — T18.9XXA SWALLOWED FOREIGN BODY, INITIAL ENCOUNTER: ICD-10-CM

## 2022-02-07 LAB
AMPHETAMINES UR QL SCN: NORMAL
BARBITURATES UR QL: NORMAL
BENZODIAZ UR QL: NORMAL
CANNABINOIDS UR QL SCN: NORMAL
COCAINE UR QL: NORMAL
CREAT UR-MCNC: 23 MG/DL
HCG UR QL: NEGATIVE
METHADONE UR QL SCN: NORMAL
OPIATES UR QL SCN: NORMAL
OXYCODONE UR QL: NORMAL
PCP UR QL SCN: NORMAL
SARS-COV-2 RNA RESP QL NAA+PROBE: NEGATIVE
UPPER GI ENDOSCOPY: NORMAL

## 2022-02-07 PROCEDURE — 999N000141 HC STATISTIC PRE-PROCEDURE NURSING ASSESSMENT: Performed by: INTERNAL MEDICINE

## 2022-02-07 PROCEDURE — 74019 RADEX ABDOMEN 2 VIEWS: CPT

## 2022-02-07 PROCEDURE — 250N000009 HC RX 250: Performed by: NURSE ANESTHETIST, CERTIFIED REGISTERED

## 2022-02-07 PROCEDURE — 360N000075 HC SURGERY LEVEL 2, PER MIN: Performed by: INTERNAL MEDICINE

## 2022-02-07 PROCEDURE — 258N000003 HC RX IP 258 OP 636: Performed by: ANESTHESIOLOGY

## 2022-02-07 PROCEDURE — 370N000017 HC ANESTHESIA TECHNICAL FEE, PER MIN: Performed by: INTERNAL MEDICINE

## 2022-02-07 PROCEDURE — 71046 X-RAY EXAM CHEST 2 VIEWS: CPT

## 2022-02-07 PROCEDURE — 87635 SARS-COV-2 COVID-19 AMP PRB: CPT | Performed by: EMERGENCY MEDICINE

## 2022-02-07 PROCEDURE — 81025 URINE PREGNANCY TEST: CPT | Performed by: EMERGENCY MEDICINE

## 2022-02-07 PROCEDURE — 250N000025 HC SEVOFLURANE, PER MIN: Performed by: INTERNAL MEDICINE

## 2022-02-07 PROCEDURE — 80307 DRUG TEST PRSMV CHEM ANLYZR: CPT | Performed by: EMERGENCY MEDICINE

## 2022-02-07 PROCEDURE — 272N000001 HC OR GENERAL SUPPLY STERILE: Performed by: INTERNAL MEDICINE

## 2022-02-07 PROCEDURE — C9803 HOPD COVID-19 SPEC COLLECT: HCPCS

## 2022-02-07 PROCEDURE — 999N000127 HC STATISTIC PERIPHERAL IV START W US GUIDANCE

## 2022-02-07 PROCEDURE — 710N000012 HC RECOVERY PHASE 2, PER MINUTE: Performed by: INTERNAL MEDICINE

## 2022-02-07 PROCEDURE — 710N000010 HC RECOVERY PHASE 1, LEVEL 2, PER MIN: Performed by: INTERNAL MEDICINE

## 2022-02-07 PROCEDURE — 250N000013 HC RX MED GY IP 250 OP 250 PS 637: Performed by: ANESTHESIOLOGY

## 2022-02-07 PROCEDURE — 99284 EMERGENCY DEPT VISIT MOD MDM: CPT | Mod: 25

## 2022-02-07 PROCEDURE — 250N000011 HC RX IP 250 OP 636: Performed by: NURSE ANESTHETIST, CERTIFIED REGISTERED

## 2022-02-07 RX ORDER — NALOXONE HYDROCHLORIDE 0.4 MG/ML
0.4 INJECTION, SOLUTION INTRAMUSCULAR; INTRAVENOUS; SUBCUTANEOUS
Status: DISCONTINUED | OUTPATIENT
Start: 2022-02-07 | End: 2022-02-07 | Stop reason: HOSPADM

## 2022-02-07 RX ORDER — MEPERIDINE HYDROCHLORIDE 25 MG/ML
12.5 INJECTION INTRAMUSCULAR; INTRAVENOUS; SUBCUTANEOUS
Status: DISCONTINUED | OUTPATIENT
Start: 2022-02-07 | End: 2022-02-07 | Stop reason: HOSPADM

## 2022-02-07 RX ORDER — LIDOCAINE HYDROCHLORIDE 10 MG/ML
INJECTION, SOLUTION INFILTRATION; PERINEURAL PRN
Status: DISCONTINUED | OUTPATIENT
Start: 2022-02-07 | End: 2022-02-07

## 2022-02-07 RX ORDER — SODIUM CHLORIDE, SODIUM LACTATE, POTASSIUM CHLORIDE, CALCIUM CHLORIDE 600; 310; 30; 20 MG/100ML; MG/100ML; MG/100ML; MG/100ML
INJECTION, SOLUTION INTRAVENOUS CONTINUOUS
Status: DISCONTINUED | OUTPATIENT
Start: 2022-02-07 | End: 2022-02-07 | Stop reason: HOSPADM

## 2022-02-07 RX ORDER — NALOXONE HYDROCHLORIDE 0.4 MG/ML
0.2 INJECTION, SOLUTION INTRAMUSCULAR; INTRAVENOUS; SUBCUTANEOUS
Status: DISCONTINUED | OUTPATIENT
Start: 2022-02-07 | End: 2022-02-07 | Stop reason: HOSPADM

## 2022-02-07 RX ORDER — FLUMAZENIL 0.1 MG/ML
0.2 INJECTION, SOLUTION INTRAVENOUS
Status: DISCONTINUED | OUTPATIENT
Start: 2022-02-07 | End: 2022-02-07 | Stop reason: HOSPADM

## 2022-02-07 RX ORDER — LIDOCAINE 40 MG/G
CREAM TOPICAL
Status: DISCONTINUED | OUTPATIENT
Start: 2022-02-07 | End: 2022-02-07 | Stop reason: HOSPADM

## 2022-02-07 RX ORDER — ONDANSETRON 4 MG/1
4 TABLET, ORALLY DISINTEGRATING ORAL EVERY 6 HOURS PRN
Status: DISCONTINUED | OUTPATIENT
Start: 2022-02-07 | End: 2022-02-07 | Stop reason: HOSPADM

## 2022-02-07 RX ORDER — DEXAMETHASONE SODIUM PHOSPHATE 10 MG/ML
INJECTION, SOLUTION INTRAMUSCULAR; INTRAVENOUS PRN
Status: DISCONTINUED | OUTPATIENT
Start: 2022-02-07 | End: 2022-02-07

## 2022-02-07 RX ORDER — DIPHENHYDRAMINE HCL 25 MG
25 CAPSULE ORAL ONCE
Status: COMPLETED | OUTPATIENT
Start: 2022-02-07 | End: 2022-02-07

## 2022-02-07 RX ORDER — ONDANSETRON 2 MG/ML
4 INJECTION INTRAMUSCULAR; INTRAVENOUS EVERY 6 HOURS PRN
Status: DISCONTINUED | OUTPATIENT
Start: 2022-02-07 | End: 2022-02-07 | Stop reason: HOSPADM

## 2022-02-07 RX ORDER — ONDANSETRON 4 MG/1
4 TABLET, ORALLY DISINTEGRATING ORAL EVERY 30 MIN PRN
Status: DISCONTINUED | OUTPATIENT
Start: 2022-02-07 | End: 2022-02-07 | Stop reason: HOSPADM

## 2022-02-07 RX ORDER — PROPOFOL 10 MG/ML
INJECTION, EMULSION INTRAVENOUS PRN
Status: DISCONTINUED | OUTPATIENT
Start: 2022-02-07 | End: 2022-02-07

## 2022-02-07 RX ORDER — ONDANSETRON 2 MG/ML
INJECTION INTRAMUSCULAR; INTRAVENOUS PRN
Status: DISCONTINUED | OUTPATIENT
Start: 2022-02-07 | End: 2022-02-07

## 2022-02-07 RX ORDER — ONDANSETRON 2 MG/ML
4 INJECTION INTRAMUSCULAR; INTRAVENOUS EVERY 30 MIN PRN
Status: DISCONTINUED | OUTPATIENT
Start: 2022-02-07 | End: 2022-02-07 | Stop reason: HOSPADM

## 2022-02-07 RX ORDER — PROCHLORPERAZINE MALEATE 10 MG
10 TABLET ORAL EVERY 6 HOURS PRN
Status: DISCONTINUED | OUTPATIENT
Start: 2022-02-07 | End: 2022-02-07 | Stop reason: HOSPADM

## 2022-02-07 RX ADMIN — ONDANSETRON 4 MG: 2 INJECTION INTRAMUSCULAR; INTRAVENOUS at 18:09

## 2022-02-07 RX ADMIN — DEXAMETHASONE SODIUM PHOSPHATE 10 MG: 10 INJECTION, SOLUTION INTRAMUSCULAR; INTRAVENOUS at 18:09

## 2022-02-07 RX ADMIN — Medication 100 MG: at 18:09

## 2022-02-07 RX ADMIN — SODIUM CHLORIDE, POTASSIUM CHLORIDE, SODIUM LACTATE AND CALCIUM CHLORIDE 1000 ML: 600; 310; 30; 20 INJECTION, SOLUTION INTRAVENOUS at 17:57

## 2022-02-07 RX ADMIN — DIPHENHYDRAMINE HYDROCHLORIDE 25 MG: 25 CAPSULE ORAL at 18:44

## 2022-02-07 RX ADMIN — PROPOFOL 200 MG: 10 INJECTION, EMULSION INTRAVENOUS at 18:09

## 2022-02-07 RX ADMIN — LIDOCAINE HYDROCHLORIDE 5 ML: 10 INJECTION, SOLUTION INFILTRATION; PERINEURAL at 18:09

## 2022-02-07 NOTE — ED PROVIDER NOTES
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 angioplasty before 65F 55M? Not Asked   Social History Narrative    Single.    Living in independent living portion of [facility].    On disability.    No regular exercise.      Social Determinants of Health     Financial Resource Strain: Not on file   Food Insecurity: Not on file   Transportation Needs: Not on file   Physical Activity: Not on file   Stress: Not on file   Social Connections: Not on file   Intimate Partner Violence: Not on file   Housing Stability: Not on file  "      VITALS:  Patient Vitals for the past 24 hrs:   BP Temp Temp src Pulse Resp SpO2 Weight   02/07/22 1603 (!) 166/103 98  F (36.7  C) Oral 85 18 96 % 135.2 kg (298 lb)       PHYSICAL EXAM    GENERAL: Awake, alert.  In no acute distress.   HEENT: Normocephalic, atraumatic.  Pupils equal, round and reactive.  Conjunctiva normal.  EOMI.  NECK: No stridor or apparent deformity.  PULMONARY: Symmetrical breath sounds without distress.  Lungs clear to auscultation bilaterally without wheezes, rhonchi or rales.  CARDIO: Regular rate and rhythm.  No significant murmur, rub or gallop.  Radial pulses strong and symmetrical.  ABDOMINAL: Abdomen soft, non-distended and non-tender to palpation.  No CVAT, no palpable hepatosplenomegaly.  EXTREMITIES: No lower extremity swelling or edema.    NEURO: Alert and oriented to person, place and time.  Cranial nerves grossly intact.  No focal motor deficit.  PSYCH: Normal mood and affect  SKIN: No rashes      LAB:  All pertinent labs reviewed and interpreted.  Results for orders placed or performed during the hospital encounter of 02/07/22   Chest XR,  PA & LAT    Impression    IMPRESSION: Lungs are clear. No pleural effusion. Heart size and pulmonary vascularity within normal limits.  An 11 cm linear metallic foreign body partially visualized in the upper abdomen anteriorly similar to 02/03/2022. Mild thoracolumbar curvature.   XR Abdomen 2 Views    Impression    IMPRESSION: A linear density is noted overlying the upper abdomen measuring greater than 11 cm, certainly would or could correspond to a \"straightened paperclip.\" This overlies the upper abdomen. This may be within the gastric lumen, though difficult to   determine. Recommend endoscopy. Rounded density overlies the lower lumbar spine, presumably external to the patient, though clinical correlation necessary. No suggestion of intraperitoneal air.    HCG qualitative urine   Result Value Ref Range    hCG Urine Qualitative " "Negative Negative   Asymptomatic COVID-19 Virus (Coronavirus) by PCR Nasopharyngeal    Specimen: Nasopharyngeal; Swab   Result Value Ref Range    SARS CoV2 PCR Negative Negative   Drugs of Abuse 1+ Panel, Urine ([facility] Only)   Result Value Ref Range    Amphetamines Urine Screen Negative Screen Negative    Benzodiazepines Urine Screen Negative Screen Negative    Opiates Urine Screen Negative Screen Negative    PCP Urine Screen Negative Screen Negative    Cannabinoids Urine Screen Negative Screen Negative    Barbiturates Urine Screen Negative Screen Negative    Cocaine Urine Screen Negative Screen Negative    Methadone Urine Screen Negative Screen Negative    Oxycodone Urine Screen Negative Screen Negative    Creatinine Urine mg/dL 23 mg/dL       RADIOLOGY:  Reviewed all pertinent imaging. Please see official radiology report.  Chest XR,  PA & LAT   Final Result   IMPRESSION: Lungs are clear. No pleural effusion. Heart size and pulmonary vascularity within normal limits.  An 11 cm linear metallic foreign body partially visualized in the upper abdomen anteriorly similar to 02/03/2022. Mild thoracolumbar curvature.      XR Abdomen 2 Views   Final Result   IMPRESSION: A linear density is noted overlying the upper abdomen measuring greater than 11 cm, certainly would or could correspond to a \"straightened paperclip.\" This overlies the upper abdomen. This may be within the gastric lumen, though difficult to    determine. Recommend endoscopy. Rounded density overlies the lower lumbar spine, presumably external to the patient, though clinical correlation necessary. No suggestion of intraperitoneal air.                   I, Dilcia Villalta, am serving as a scribe to document services personally performed by Dr. Mavis Garcia based on my observation and the provider's statements to me. I, Mavis Garcia MD attest that Dilcia Villalta is acting in a scribe capacity, has observed my performance of the services and has documented them " in accordance with my direction.      Mavis Garcia MD  02/07/22 [id number]
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
## 2022-02-07 NOTE — ED TRIAGE NOTES
Patient reports struggling with anxiety and had been doing well, but about 6 months ago it got worse and her coping mechanism is swallowing FB, @1445 today she reports straightening out a paper clip and swallow it, reports some belly pain.  Catherine Casillas RN.......2/7/2022 4:02 PM

## 2022-02-07 NOTE — H&P
Pre-procedure Note    Reason for procedure: Swallowed foreign body    History and Physical Reviewed: Reviewed, no changes.    Pre-sedation assessment:    General: alert, appears stated age, and cooperative  Airway: normal  Heart: regular rate and rhythm  Lungs: clear to auscultation bilaterally    Sedation Plan based on assessment: MAC or general      ASA Classification: ASA 3 - Patient with moderate systemic disease with functional limitations    Impression: Patient deemed adequate candidate for conscious sedation    Risks, benefits and alternatives were discussed with the patient and informed consent was obtained.    Plan: esophagogastroduodenoscopy                                                    Darek Lucero MD  Thank you for the opportunity to participate in the care of this patient.   Please feel free to call me with any questions or concerns.  Phone number [phone].

## 2022-02-08 ENCOUNTER — APPOINTMENT (OUTPATIENT)
Dept: GENERAL RADIOLOGY | Facility: CLINIC | Age: 32
DRG: 882 | End: 2022-02-08
Attending: EMERGENCY MEDICINE
Payer: COMMERCIAL

## 2022-02-08 ENCOUNTER — ANESTHESIA (OUTPATIENT)
Dept: SURGERY | Facility: CLINIC | Age: 32
DRG: 882 | End: 2022-02-08
Payer: COMMERCIAL

## 2022-02-08 ENCOUNTER — APPOINTMENT (OUTPATIENT)
Dept: CT IMAGING | Facility: HOSPITAL | Age: 32
End: 2022-02-08
Attending: FAMILY MEDICINE
Payer: COMMERCIAL

## 2022-02-08 ENCOUNTER — ANESTHESIA EVENT (OUTPATIENT)
Dept: SURGERY | Facility: CLINIC | Age: 32
DRG: 882 | End: 2022-02-08
Payer: COMMERCIAL

## 2022-02-08 ENCOUNTER — HOSPITAL ENCOUNTER (INPATIENT)
Facility: CLINIC | Age: 32
LOS: 7 days | Discharge: NURSING, REHAB OR LONG-TERM CARE | DRG: 882 | End: 2022-02-17
Attending: EMERGENCY MEDICINE | Admitting: PSYCHIATRY & NEUROLOGY
Payer: COMMERCIAL

## 2022-02-08 ENCOUNTER — NURSE TRIAGE (OUTPATIENT)
Dept: NURSING | Facility: CLINIC | Age: 32
End: 2022-02-08
Payer: COMMERCIAL

## 2022-02-08 ENCOUNTER — HOSPITAL ENCOUNTER (EMERGENCY)
Facility: HOSPITAL | Age: 32
Discharge: HOME | End: 2022-02-08
Attending: FAMILY MEDICINE | Admitting: FAMILY MEDICINE
Payer: COMMERCIAL

## 2022-02-08 VITALS
TEMPERATURE: 98.6 F | DIASTOLIC BLOOD PRESSURE: 70 MMHG | OXYGEN SATURATION: 98 % | HEART RATE: 85 BPM | HEIGHT: 62 IN | RESPIRATION RATE: 20 BRPM | WEIGHT: 293 LBS | SYSTOLIC BLOOD PRESSURE: 128 MMHG | BODY MASS INDEX: 53.92 KG/M2

## 2022-02-08 DIAGNOSIS — T18.9XXD SWALLOWED FOREIGN BODY, SUBSEQUENT ENCOUNTER: Primary | ICD-10-CM

## 2022-02-08 DIAGNOSIS — G62.9 POLYNEUROPATHY: ICD-10-CM

## 2022-02-08 DIAGNOSIS — K21.9 GASTROESOPHAGEAL REFLUX DISEASE WITHOUT ESOPHAGITIS: ICD-10-CM

## 2022-02-08 DIAGNOSIS — F50.83 PICA IN ADULTS: ICD-10-CM

## 2022-02-08 DIAGNOSIS — F41.9 ANXIETY DISORDER, UNSPECIFIED TYPE: ICD-10-CM

## 2022-02-08 DIAGNOSIS — F41.9 ANXIETY: ICD-10-CM

## 2022-02-08 DIAGNOSIS — T18.9XXA FOREIGN BODY INGESTION, INITIAL ENCOUNTER: ICD-10-CM

## 2022-02-08 DIAGNOSIS — R10.13 EPIGASTRIC PAIN: ICD-10-CM

## 2022-02-08 DIAGNOSIS — Z20.822 CONTACT WITH AND (SUSPECTED) EXPOSURE TO COVID-19: ICD-10-CM

## 2022-02-08 DIAGNOSIS — M54.2 NECK PAIN ON LEFT SIDE: ICD-10-CM

## 2022-02-08 DIAGNOSIS — F43.10 POSTTRAUMATIC STRESS DISORDER: ICD-10-CM

## 2022-02-08 DIAGNOSIS — F33.1 MAJOR DEPRESSIVE DISORDER, RECURRENT EPISODE, MODERATE (H): ICD-10-CM

## 2022-02-08 DIAGNOSIS — R76.8 RED BLOOD CELL ANTIBODY POSITIVE: ICD-10-CM

## 2022-02-08 DIAGNOSIS — F60.3 BORDERLINE PERSONALITY DISORDER (H): ICD-10-CM

## 2022-02-08 DIAGNOSIS — N80.9 ENDOMETRIOSIS: ICD-10-CM

## 2022-02-08 DIAGNOSIS — Z78.9 OTHER SPECIFIED HEALTH STATUS: ICD-10-CM

## 2022-02-08 DIAGNOSIS — F43.12 CHRONIC POST-TRAUMATIC STRESS DISORDER (PTSD): ICD-10-CM

## 2022-02-08 LAB
AMPHETAMINES UR QL SCN: NORMAL
AMYLASE SERPL-CCNC: 29 U/L (ref 5–120)
ANION GAP SERPL CALCULATED.3IONS-SCNC: 11 MMOL/L (ref 5–18)
ANION GAP SERPL CALCULATED.3IONS-SCNC: 8 MMOL/L (ref 3–14)
BARBITURATES UR QL: NORMAL
BASOPHILS # BLD AUTO: 0 10E3/UL (ref 0–0.2)
BASOPHILS NFR BLD AUTO: 0 %
BENZODIAZ UR QL: NORMAL
BUN SERPL-MCNC: 12 MG/DL (ref 8–22)
BUN SERPL-MCNC: 17 MG/DL (ref 7–30)
CALCIUM SERPL-MCNC: 9.5 MG/DL (ref 8.5–10.1)
CALCIUM SERPL-MCNC: 9.5 MG/DL (ref 8.5–10.5)
CANNABINOIDS UR QL SCN: NORMAL
CHLORIDE BLD-SCNC: 105 MMOL/L (ref 98–107)
CHLORIDE BLD-SCNC: 108 MMOL/L (ref 94–109)
CO2 SERPL-SCNC: 23 MMOL/L (ref 22–31)
CO2 SERPL-SCNC: 24 MMOL/L (ref 20–32)
COCAINE UR QL: NORMAL
CREAT SERPL-MCNC: 0.56 MG/DL (ref 0.52–1.04)
CREAT SERPL-MCNC: 0.68 MG/DL (ref 0.6–1.1)
EOSINOPHIL # BLD AUTO: 0 10E3/UL (ref 0–0.7)
EOSINOPHIL NFR BLD AUTO: 0 %
ERYTHROCYTE [DISTWIDTH] IN BLOOD BY AUTOMATED COUNT: 13.9 % (ref 10–15)
GFR SERPL CREATININE-BSD FRML MDRD: >90 ML/MIN/1.73M2
GFR SERPL CREATININE-BSD FRML MDRD: >90 ML/MIN/1.73M2
GLUCOSE BLD-MCNC: 122 MG/DL (ref 70–99)
GLUCOSE BLD-MCNC: 164 MG/DL (ref 70–125)
HCG SERPL QL: NEGATIVE
HCT VFR BLD AUTO: 38.3 % (ref 35–47)
HGB BLD-MCNC: 12.7 G/DL (ref 11.7–15.7)
HOLD SPECIMEN: NORMAL
IMM GRANULOCYTES # BLD: 0.1 10E3/UL
IMM GRANULOCYTES NFR BLD: 0 %
LYMPHOCYTES # BLD AUTO: 2.3 10E3/UL (ref 0.8–5.3)
LYMPHOCYTES NFR BLD AUTO: 16 %
MCH RBC QN AUTO: 28.9 PG (ref 26.5–33)
MCHC RBC AUTO-ENTMCNC: 33.2 G/DL (ref 31.5–36.5)
MCV RBC AUTO: 87 FL (ref 78–100)
MONOCYTES # BLD AUTO: 1.2 10E3/UL (ref 0–1.3)
MONOCYTES NFR BLD AUTO: 8 %
NEUTROPHILS # BLD AUTO: 11.3 10E3/UL (ref 1.6–8.3)
NEUTROPHILS NFR BLD AUTO: 76 %
NRBC # BLD AUTO: 0 10E3/UL
NRBC BLD AUTO-RTO: 0 /100
OPIATES UR QL SCN: NORMAL
PLATELET # BLD AUTO: 300 10E3/UL (ref 150–450)
POTASSIUM BLD-SCNC: 4.1 MMOL/L (ref 3.5–5)
POTASSIUM BLD-SCNC: 4.8 MMOL/L (ref 3.4–5.3)
RBC # BLD AUTO: 4.4 10E6/UL (ref 3.8–5.2)
SARS-COV-2 RNA RESP QL NAA+PROBE: NEGATIVE
SODIUM SERPL-SCNC: 139 MMOL/L (ref 136–145)
SODIUM SERPL-SCNC: 140 MMOL/L (ref 133–144)
WBC # BLD AUTO: 14.9 10E3/UL (ref 4–11)

## 2022-02-08 PROCEDURE — 36415 COLL VENOUS BLD VENIPUNCTURE: CPT | Performed by: FAMILY MEDICINE

## 2022-02-08 PROCEDURE — 360N000075 HC SURGERY LEVEL 2, PER MIN: Performed by: INTERNAL MEDICINE

## 2022-02-08 PROCEDURE — 36415 COLL VENOUS BLD VENIPUNCTURE: CPT | Performed by: EMERGENCY MEDICINE

## 2022-02-08 PROCEDURE — 99285 EMERGENCY DEPT VISIT HI MDM: CPT | Mod: 25

## 2022-02-08 PROCEDURE — 250N000025 HC SEVOFLURANE, PER MIN: Performed by: INTERNAL MEDICINE

## 2022-02-08 PROCEDURE — 250N000011 HC RX IP 250 OP 636: Performed by: FAMILY MEDICINE

## 2022-02-08 PROCEDURE — 71045 X-RAY EXAM CHEST 1 VIEW: CPT

## 2022-02-08 PROCEDURE — 82306 VITAMIN D 25 HYDROXY: CPT | Performed by: STUDENT IN AN ORGANIZED HEALTH CARE EDUCATION/TRAINING PROGRAM

## 2022-02-08 PROCEDURE — 96374 THER/PROPH/DIAG INJ IV PUSH: CPT | Mod: 59

## 2022-02-08 PROCEDURE — 250N000009 HC RX 250: Performed by: NURSE ANESTHETIST, CERTIFIED REGISTERED

## 2022-02-08 PROCEDURE — 250N000011 HC RX IP 250 OP 636: Performed by: NURSE ANESTHETIST, CERTIFIED REGISTERED

## 2022-02-08 PROCEDURE — 710N000010 HC RECOVERY PHASE 1, LEVEL 2, PER MIN: Performed by: INTERNAL MEDICINE

## 2022-02-08 PROCEDURE — 71260 CT THORAX DX C+: CPT

## 2022-02-08 PROCEDURE — 370N000017 HC ANESTHESIA TECHNICAL FEE, PER MIN: Performed by: INTERNAL MEDICINE

## 2022-02-08 PROCEDURE — 87635 SARS-COV-2 COVID-19 AMP PRB: CPT | Performed by: EMERGENCY MEDICINE

## 2022-02-08 PROCEDURE — 80048 BASIC METABOLIC PNL TOTAL CA: CPT | Performed by: EMERGENCY MEDICINE

## 2022-02-08 PROCEDURE — 258N000003 HC RX IP 258 OP 636: Performed by: NURSE ANESTHETIST, CERTIFIED REGISTERED

## 2022-02-08 PROCEDURE — 272N000001 HC OR GENERAL SUPPLY STERILE: Performed by: INTERNAL MEDICINE

## 2022-02-08 PROCEDURE — 80307 DRUG TEST PRSMV CHEM ANLYZR: CPT | Performed by: EMERGENCY MEDICINE

## 2022-02-08 PROCEDURE — 999N000141 HC STATISTIC PRE-PROCEDURE NURSING ASSESSMENT: Performed by: INTERNAL MEDICINE

## 2022-02-08 PROCEDURE — 70491 CT SOFT TISSUE NECK W/DYE: CPT

## 2022-02-08 PROCEDURE — 0DC68ZZ EXTIRPATION OF MATTER FROM STOMACH, VIA NATURAL OR ARTIFICIAL OPENING ENDOSCOPIC: ICD-10-PCS | Performed by: INTERNAL MEDICINE

## 2022-02-08 PROCEDURE — 84703 CHORIONIC GONADOTROPIN ASSAY: CPT | Performed by: EMERGENCY MEDICINE

## 2022-02-08 PROCEDURE — 74018 RADEX ABDOMEN 1 VIEW: CPT

## 2022-02-08 PROCEDURE — 250N000011 HC RX IP 250 OP 636: Performed by: EMERGENCY MEDICINE

## 2022-02-08 PROCEDURE — 80048 BASIC METABOLIC PNL TOTAL CA: CPT | Performed by: FAMILY MEDICINE

## 2022-02-08 PROCEDURE — 85025 COMPLETE CBC W/AUTO DIFF WBC: CPT | Performed by: EMERGENCY MEDICINE

## 2022-02-08 PROCEDURE — 82150 ASSAY OF AMYLASE: CPT | Performed by: FAMILY MEDICINE

## 2022-02-08 RX ORDER — LIDOCAINE HYDROCHLORIDE 20 MG/ML
INJECTION, SOLUTION INFILTRATION; PERINEURAL PRN
Status: DISCONTINUED | OUTPATIENT
Start: 2022-02-08 | End: 2022-02-09

## 2022-02-08 RX ORDER — DEXAMETHASONE SODIUM PHOSPHATE 4 MG/ML
INJECTION, SOLUTION INTRA-ARTICULAR; INTRALESIONAL; INTRAMUSCULAR; INTRAVENOUS; SOFT TISSUE PRN
Status: DISCONTINUED | OUTPATIENT
Start: 2022-02-08 | End: 2022-02-09

## 2022-02-08 RX ORDER — SODIUM CHLORIDE, SODIUM LACTATE, POTASSIUM CHLORIDE, CALCIUM CHLORIDE 600; 310; 30; 20 MG/100ML; MG/100ML; MG/100ML; MG/100ML
INJECTION, SOLUTION INTRAVENOUS CONTINUOUS PRN
Status: DISCONTINUED | OUTPATIENT
Start: 2022-02-08 | End: 2022-02-09

## 2022-02-08 RX ORDER — ONDANSETRON 2 MG/ML
INJECTION INTRAMUSCULAR; INTRAVENOUS PRN
Status: DISCONTINUED | OUTPATIENT
Start: 2022-02-08 | End: 2022-02-09

## 2022-02-08 RX ORDER — IOPAMIDOL 755 MG/ML
100 INJECTION, SOLUTION INTRAVASCULAR ONCE
Status: COMPLETED | OUTPATIENT
Start: 2022-02-08 | End: 2022-02-08

## 2022-02-08 RX ORDER — KETOROLAC TROMETHAMINE 30 MG/ML
INJECTION, SOLUTION INTRAMUSCULAR; INTRAVENOUS PRN
Status: DISCONTINUED | OUTPATIENT
Start: 2022-02-08 | End: 2022-02-09

## 2022-02-08 RX ORDER — PROPOFOL 10 MG/ML
INJECTION, EMULSION INTRAVENOUS PRN
Status: DISCONTINUED | OUTPATIENT
Start: 2022-02-08 | End: 2022-02-09

## 2022-02-08 RX ORDER — LORAZEPAM 2 MG/ML
0.5 INJECTION INTRAMUSCULAR ONCE
Status: COMPLETED | OUTPATIENT
Start: 2022-02-08 | End: 2022-02-08

## 2022-02-08 RX ORDER — FENTANYL CITRATE 50 UG/ML
INJECTION, SOLUTION INTRAMUSCULAR; INTRAVENOUS PRN
Status: DISCONTINUED | OUTPATIENT
Start: 2022-02-08 | End: 2022-02-09

## 2022-02-08 RX ADMIN — LORAZEPAM 0.5 MG: 2 INJECTION INTRAMUSCULAR; INTRAVENOUS at 09:01

## 2022-02-08 RX ADMIN — IOPAMIDOL 100 ML: 755 INJECTION, SOLUTION INTRAVENOUS at 09:21

## 2022-02-08 RX ADMIN — Medication 200 MG: at 23:27

## 2022-02-08 RX ADMIN — DEXAMETHASONE SODIUM PHOSPHATE 6 MG: 4 INJECTION, SOLUTION INTRA-ARTICULAR; INTRALESIONAL; INTRAMUSCULAR; INTRAVENOUS; SOFT TISSUE at 23:27

## 2022-02-08 RX ADMIN — LIDOCAINE HYDROCHLORIDE 100 MG: 20 INJECTION, SOLUTION INFILTRATION; PERINEURAL at 23:27

## 2022-02-08 RX ADMIN — KETOROLAC TROMETHAMINE 30 MG: 30 INJECTION, SOLUTION INTRAMUSCULAR at 23:55

## 2022-02-08 RX ADMIN — ONDANSETRON 4 MG: 2 INJECTION INTRAMUSCULAR; INTRAVENOUS at 23:27

## 2022-02-08 RX ADMIN — PROPOFOL 40 MG: 10 INJECTION, EMULSION INTRAVENOUS at 23:53

## 2022-02-08 RX ADMIN — SODIUM CHLORIDE, POTASSIUM CHLORIDE, SODIUM LACTATE AND CALCIUM CHLORIDE: 600; 310; 30; 20 INJECTION, SOLUTION INTRAVENOUS at 23:16

## 2022-02-08 RX ADMIN — PROPOFOL 200 MG: 10 INJECTION, EMULSION INTRAVENOUS at 23:27

## 2022-02-08 RX ADMIN — MIDAZOLAM 2 MG: 1 INJECTION INTRAMUSCULAR; INTRAVENOUS at 23:27

## 2022-02-08 RX ADMIN — FENTANYL CITRATE 50 MCG: 50 INJECTION, SOLUTION INTRAMUSCULAR; INTRAVENOUS at 23:27

## 2022-02-08 ASSESSMENT — ENCOUNTER SYMPTOMS
FEVER: 0
SHORTNESS OF BREATH: 0
HEADACHES: 0
DYSURIA: 0
VOMITING: 0
NECK PAIN: 0
ABDOMINAL PAIN: 0
SHORTNESS OF BREATH: 0
BACK PAIN: 0
EYE REDNESS: 0
SLEEP DISTURBANCE: 0
DIFFICULTY URINATING: 0
NECK PAIN: 1
COUGH: 0
NERVOUS/ANXIOUS: 1
SORE THROAT: 0
TROUBLE SWALLOWING: 0
NAUSEA: 0

## 2022-02-08 ASSESSMENT — MIFFLIN-ST. JEOR: SCORE: 2024.97

## 2022-02-08 NOTE — ED PROVIDER NOTES
ED SIGNOUT  Date/Time:2/8/2022 7:54 AM    Patient signed out to me by my colleague, Dr. Gee MD.  Please see their note for complete history and physical. Plan to follow up on CT chest and neck.     The creation of this record is based on the scribe s observations of the work being performed by Dr. Erich DO and the provider s statements to them. It was created on their behalf by Mavis Mitchell a trained medical scribe. This document has been checked and approved by the attending provider.           ED Course/MDM:  7:00 AM Signout accepted from Dr. Gee MD.  Prior records were reviewed.  Diagnostics from this visit are reviewed.  10:13 AM the patient's CTs returned unremarkable. Given her continued well clinical appearance I feel she can be safely discharged home. She is comfortable with this plan.      Diagnosis:  1. Neck pain on left side                 Nish Jung MD  02/09/22 [id number]

## 2022-02-08 NOTE — ANESTHESIA PROCEDURE NOTES
Airway       Patient location during procedure: OR       Procedure Start/Stop Times: 2/7/2022 6:11 PM and 2/7/2022 6:12 PM  Staff -        Anesthesiologist:  Jessi Wynn MD       CRNA: Rhea Gabriel APRN CRNA       Performed By: CRNAIndications and Patient Condition       Indications for airway management: isma-procedural       Induction type:RSI       Mask difficulty assessment: 0 - not attempted    Final Airway Details       Final airway type: endotracheal airway       Successful airway: ETT - single  Endotracheal Airway Details        ETT size (mm): 7.0       Successful intubation technique: video laryngoscopy       VL Blade Size: Glidescope 3       Grade View of Cords: 1       Position: Right       Measured from: lips       Secured at (cm): 21       Bite Block used: gi bite block.    Post intubation assessment        Placement verified by: capnometry and equal breath sounds        Number of attempts at approach: 1       Secured with: silk tape       Ease of procedure: easy       Dentition: Intact and Unchanged

## 2022-02-08 NOTE — ANESTHESIA CARE TRANSFER NOTE
Patient: Nevin Alvarado    Procedure: Procedure(s):  ESOPHAGOGASTRODUODENOSCOPY (EGD) WITH FOREIGN BODY REMOVAL       Diagnosis: Swallowed foreign body, initial encounter [T18.9XXA]  Diagnosis Additional Information: No value filed.    Anesthesia Type:   General     Note:    Oropharynx: oropharynx clear of all foreign objects  Level of Consciousness: awake  Oxygen Supplementation: face mask  Level of Supplemental Oxygen (L/min / FiO2): 8  Independent Airway: airway patency satisfactory and stable  Dentition: dentition unchanged  Vital Signs Stable: post-procedure vital signs reviewed and stable  Report to RN Given: handoff report given  Patient transferred to: PACU    Handoff Report: Identifed the Patient, Identified the Reponsible Provider, Reviewed the pertinent medical history, Discussed the surgical course, Reviewed Intra-OP anesthesia mangement and issues during anesthesia, Set expectations for post-procedure period and Allowed opportunity for questions and acknowledgement of understanding      Vitals:  Vitals Value Taken Time   /81 02/07/22 1833   Temp     Pulse 99 02/07/22 1833   Resp 17 02/07/22 1833   SpO2 99 % 02/07/22 1833   Vitals shown include unvalidated device data.    Electronically Signed By: HU Arceo CRNA  February 7, 2022  6:34 PM

## 2022-02-08 NOTE — ANESTHESIA POSTPROCEDURE EVALUATION
Patient: Nevin Alvarado    Procedure: Procedure(s):  ESOPHAGOGASTRODUODENOSCOPY (EGD) WITH FOREIGN BODY REMOVAL       Diagnosis:Swallowed foreign body, initial encounter [T18.9XXA]  Diagnosis Additional Information: No value filed.    Anesthesia Type:  General    Note:  Disposition: Outpatient   Postop Pain Control: Uneventful            Sign Out: Well controlled pain   PONV: No   Neuro/Psych: Uneventful            Sign Out: Acceptable/Baseline neuro status   Airway/Respiratory: Uneventful            Sign Out: Acceptable/Baseline resp. status   CV/Hemodynamics: Uneventful            Sign Out: Acceptable CV status; No obvious hypovolemia; No obvious fluid overload   Other NRE: NONE   DID A NON-ROUTINE EVENT OCCUR? No           Last vitals:  Vitals Value Taken Time   /71 02/07/22 1850   Temp 36.4  C (97.6  F) 02/07/22 1850   Pulse 97 02/07/22 1850   Resp 18 02/07/22 1850   SpO2 94 % 02/07/22 1850       Electronically Signed By: Jessi Wynn MD  February 7, 2022  6:54 PM

## 2022-02-08 NOTE — ED PROVIDER NOTES
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 Nose normal.      Mouth/Throat:      Mouth: Mucous membranes are moist.      Comments: No posterior oropharyngeal swelling, bleeding, or asymmetry.  Tenderness along the angle and body of the mandible on the left  Eyes:      Extraocular Movements: Extraocular movements intact.      Conjunctiva/sclera: Conjunctivae normal.      Pupils: Pupils are equal, round, and reactive to light.   Cardiovascular:      Rate and Rhythm: " Normal rate and regular rhythm.   Pulmonary:      Effort: Pulmonary effort is normal.      Breath sounds: Normal breath sounds. No wheezing or rales.   Abdominal:      General: Abdomen is flat. There is no distension.      Palpations: Abdomen is soft.      Tenderness: There is no abdominal tenderness. There is no guarding.   Musculoskeletal:         General: Normal range of motion.      Cervical back: Normal range of motion and neck supple.      Right lower leg: No edema.      Left lower leg: No edema.   Lymphadenopathy:      Cervical: No cervical adenopathy.   Skin:     General: Skin is warm and dry.   Neurological:      General: No focal deficit present.      Mental Status: She is alert and oriented to person, place, and time. Mental status is at baseline.      Comments: No gross focal neurologic deficits   Psychiatric:         Mood and Affect: Mood normal.         Behavior: Behavior normal.         Thought Content: Thought content normal.          LAB:  All pertinent labs reviewed and interpreted.  Results for orders placed or performed during the hospital encounter of 02/08/22   Basic metabolic panel   Result Value Ref Range    Sodium 139 136 - 145 mmol/L    Potassium 4.1 3.5 - 5.0 mmol/L    Chloride 105 98 - 107 mmol/L    Carbon Dioxide (CO2) 23 22 - 31 mmol/L    Anion Gap 11 5 - 18 mmol/L    Urea Nitrogen 12 8 - 22 mg/dL    Creatinine 0.68 0.60 - 1.10 mg/dL    Calcium 9.5 8.5 - 10.5 mg/dL    Glucose 164 (H) 70 - 125 mg/dL    GFR Estimate >90 >60 mL/min/1.73m2   Result Value Ref Range    Amylase 29 5 - 120 U/L       RADIOLOGY:  Reviewed all pertinent imaging. Please see official radiology report.  Soft tissue neck CT w contrast    (Results Pending)   CT Chest w Contrast    (Results Pending)       I, Girish Iglesias, am serving as a scribe to document services personally performed by Dr. Flynn based on my observation and the provider's statements to me. I, Reece Flynn MD attest that Girish Iglesias is acting  in a scribe capacity, has observed my performance of the services and has documented them in accordance with my direction.    Reece Flynn M.D.  Emergency Medicine  [facility] EMERGENCY DEPARTMENT  [address] [place] [id number]  [phone]  Dept: [phone]     Reece Flynn MD  02/08/22 0705

## 2022-02-08 NOTE — TELEPHONE ENCOUNTER
"Triage call    Pt calling with report of sharp constant throbbing neck pain the past 1-2 hours.  Rates pain 6/10 and says it is painful to bend her neck toward the left. Denies neck swelling or fever.    Pt says yesterday she had an upper Endoscopy to remove a foreign body from her esophagus (straightened-out paper clip.)     Denies breathing problems, and denies she has swallowed anything else. Says her throat is not sore, but her neck hurts when she swallows.    Advised pt to call the GI clinic to talk to the On-Call surgeon, since neck pain may be a complication of her procedure.    Katharina Reno RN              Additional Information    Negative: Shock suspected (e.g., cold/pale/clammy skin, too weak to stand, low BP, rapid pulse)    Negative: Difficult to awaken or acting confused (e.g., disoriented, slurred speech)    Negative: [1] Similar pain previously AND [2] it was from \"heart attack\"    Negative: [1] Similar pain previously AND [2] it was from \"angina\" AND [3] not relieved by nitroglycerin    Negative: Sounds like a life-threatening emergency to the triager    Negative: Lymph node in the neck is swollen or painful to the touch    Negative: Chest pain    Negative: Followed a neck injury (e.g., MVA, sports, impact or collision)    Negative: Sore throat is main symptom    Negative: Difficulty breathing or unusual sweating (e.g., sweating without exertion)    Negative: [1] Stiff neck (can't put chin to chest) AND [2] headache    Negative: [1] Stiff neck (can't put chin to chest) AND [2] fever    Negative: Weakness of an arm or hand    Negative: Problems with bowel or bladder control    Negative: Head is twisting to one side (or ask \"is it turning against your will?\")    Negative: Patient sounds very sick or weak to the triager    Negative: [1] SEVERE neck pain (e.g., excruciating, unable to do any normal activities) AND [2] not improved after 2 hours of pain medicine    Negative: [1] Fever > 100.0 F " "(37.8 C) AND [2] IVDA (intravenous drug abuse)    Negative: [1] Fever > 100.0 F (37.8 C) AND [2] diabetes mellitus or weak immune system (e.g., HIV positive, cancer chemo, splenectomy, organ transplant, chronic steroids)    Negative: Numbness in an arm or hand (i.e., loss of sensation)    Negative: Tenderness or swelling of front of neck over windpipe    Negative: Rash in same area as pain (may be described as \"small blisters\")    Negative: High-risk adult (e.g., history of cancer, HIV, or IV drug abuse)    Negative: [1] MODERATE neck pain (e.g., interferes with normal activities AND [2] present > 3 days    Negative: Neck pain present > 2 weeks    Negative: Pain shoots (radiates) into arm or hand    Negative: Neck pain is a chronic symptom (recurrent or ongoing AND present > 4 weeks)    Negative: [1] Age > 50 AND [2] no history of prior similar neck pain    Negative: Caused by a twisting, bending, or lifting injury    Negative: Caused by overuse from recent vigorous activity (e.g., exercise, work, sports)    Negative: Neck pain  or stiffness    Negative: Preventing neck strain, questions about    Protocols used: NECK PAIN OR [id number]      "

## 2022-02-08 NOTE — OR NURSING
Telephone conversation with legal guardian, Aaliyah Farr, during pre op phase of care to verify surgical procedure.

## 2022-02-08 NOTE — DISCHARGE INSTRUCTIONS
Fortunately you do not appear to have suffered any injuries from your procedure yesterday. Follow-up with your primary care doctor as an outpatient and return to the emergency department for any worsening symptoms or other concerns.

## 2022-02-09 ENCOUNTER — TELEPHONE (OUTPATIENT)
Dept: BEHAVIORAL HEALTH | Facility: CLINIC | Age: 32
End: 2022-02-09
Payer: COMMERCIAL

## 2022-02-09 ENCOUNTER — HOSPITAL ENCOUNTER (EMERGENCY)
Facility: CLINIC | Age: 32
Discharge: STILL ADMITTED OR ADMINISTRATIVE | DRG: 882 | End: 2022-02-09
Payer: COMMERCIAL

## 2022-02-09 VITALS
RESPIRATION RATE: 18 BRPM | BODY MASS INDEX: 54.5 KG/M2 | OXYGEN SATURATION: 98 % | WEIGHT: 293 LBS | SYSTOLIC BLOOD PRESSURE: 125 MMHG | TEMPERATURE: 98 F | HEART RATE: 76 BPM | DIASTOLIC BLOOD PRESSURE: 80 MMHG

## 2022-02-09 LAB — UPPER GI ENDOSCOPY: NORMAL

## 2022-02-09 PROCEDURE — 250N000011 HC RX IP 250 OP 636: Performed by: ANESTHESIOLOGY

## 2022-02-09 PROCEDURE — 258N000003 HC RX IP 258 OP 636: Performed by: ANESTHESIOLOGY

## 2022-02-09 PROCEDURE — 250N000011 HC RX IP 250 OP 636: Performed by: NURSE ANESTHETIST, CERTIFIED REGISTERED

## 2022-02-09 PROCEDURE — 250N000013 HC RX MED GY IP 250 OP 250 PS 637: Performed by: EMERGENCY MEDICINE

## 2022-02-09 PROCEDURE — 99285 EMERGENCY DEPT VISIT HI MDM: CPT | Mod: 25 | Performed by: EMERGENCY MEDICINE

## 2022-02-09 PROCEDURE — 250N000009 HC RX 250: Performed by: EMERGENCY MEDICINE

## 2022-02-09 PROCEDURE — C9803 HOPD COVID-19 SPEC COLLECT: HCPCS | Performed by: EMERGENCY MEDICINE

## 2022-02-09 PROCEDURE — 90791 PSYCH DIAGNOSTIC EVALUATION: CPT

## 2022-02-09 PROCEDURE — 99285 EMERGENCY DEPT VISIT HI MDM: CPT | Performed by: EMERGENCY MEDICINE

## 2022-02-09 RX ORDER — PROPRANOLOL HYDROCHLORIDE 10 MG/1
10 TABLET ORAL DAILY PRN
Status: DISCONTINUED | OUTPATIENT
Start: 2022-02-09 | End: 2022-02-17 | Stop reason: HOSPADM

## 2022-02-09 RX ORDER — PROPRANOLOL HYDROCHLORIDE 10 MG/1
10 TABLET ORAL ONCE
Status: COMPLETED | OUTPATIENT
Start: 2022-02-09 | End: 2022-02-09

## 2022-02-09 RX ORDER — PROPRANOLOL HYDROCHLORIDE 10 MG/1
10 TABLET ORAL
Status: ON HOLD | COMMUNITY
End: 2022-02-16

## 2022-02-09 RX ORDER — FERROUS SULFATE 325(65) MG
325 TABLET ORAL
Status: DISCONTINUED | OUTPATIENT
Start: 2022-02-10 | End: 2022-02-17 | Stop reason: HOSPADM

## 2022-02-09 RX ORDER — LURASIDONE HYDROCHLORIDE 40 MG/1
40 TABLET, FILM COATED ORAL
Status: DISCONTINUED | OUTPATIENT
Start: 2022-02-09 | End: 2022-02-10

## 2022-02-09 RX ORDER — HYDROXYZINE HYDROCHLORIDE 25 MG/1
25 TABLET, FILM COATED ORAL ONCE
Status: DISCONTINUED | OUTPATIENT
Start: 2022-02-09 | End: 2022-02-10

## 2022-02-09 RX ORDER — SODIUM CHLORIDE, SODIUM LACTATE, POTASSIUM CHLORIDE, CALCIUM CHLORIDE 600; 310; 30; 20 MG/100ML; MG/100ML; MG/100ML; MG/100ML
INJECTION, SOLUTION INTRAVENOUS CONTINUOUS
Status: DISCONTINUED | OUTPATIENT
Start: 2022-02-09 | End: 2022-02-09 | Stop reason: HOSPADM

## 2022-02-09 RX ORDER — MULTIVIT-MIN/IRON/FOLIC ACID/K 18-600-40
2000 CAPSULE ORAL DAILY
Status: CANCELLED | OUTPATIENT
Start: 2022-02-09

## 2022-02-09 RX ORDER — DESVENLAFAXINE 50 MG/1
100 TABLET, FILM COATED, EXTENDED RELEASE ORAL DAILY
Status: DISCONTINUED | OUTPATIENT
Start: 2022-02-09 | End: 2022-02-17 | Stop reason: HOSPADM

## 2022-02-09 RX ORDER — FENTANYL CITRATE 50 UG/ML
25 INJECTION, SOLUTION INTRAMUSCULAR; INTRAVENOUS EVERY 5 MIN PRN
Status: DISCONTINUED | OUTPATIENT
Start: 2022-02-09 | End: 2022-02-09 | Stop reason: HOSPADM

## 2022-02-09 RX ORDER — ONDANSETRON 2 MG/ML
4 INJECTION INTRAMUSCULAR; INTRAVENOUS EVERY 30 MIN PRN
Status: DISCONTINUED | OUTPATIENT
Start: 2022-02-09 | End: 2022-02-09 | Stop reason: HOSPADM

## 2022-02-09 RX ORDER — DIPHENHYDRAMINE HYDROCHLORIDE 50 MG/ML
25 INJECTION INTRAMUSCULAR; INTRAVENOUS EVERY 6 HOURS PRN
Status: DISCONTINUED | OUTPATIENT
Start: 2022-02-09 | End: 2022-02-09 | Stop reason: HOSPADM

## 2022-02-09 RX ORDER — CETIRIZINE HYDROCHLORIDE 10 MG/1
10 TABLET ORAL DAILY
Status: CANCELLED | OUTPATIENT
Start: 2022-02-09

## 2022-02-09 RX ORDER — ALBUTEROL SULFATE 90 UG/1
2 AEROSOL, METERED RESPIRATORY (INHALATION) EVERY 6 HOURS PRN
Status: CANCELLED | OUTPATIENT
Start: 2022-02-09

## 2022-02-09 RX ORDER — HYDROXYCHLOROQUINE SULFATE 200 MG/1
200 TABLET, FILM COATED ORAL 2 TIMES DAILY
Status: DISCONTINUED | OUTPATIENT
Start: 2022-02-09 | End: 2022-02-17 | Stop reason: HOSPADM

## 2022-02-09 RX ORDER — ALBUTEROL SULFATE 90 UG/1
2 AEROSOL, METERED RESPIRATORY (INHALATION) EVERY 6 HOURS PRN
Status: DISCONTINUED | OUTPATIENT
Start: 2022-02-09 | End: 2022-02-17 | Stop reason: HOSPADM

## 2022-02-09 RX ORDER — DESVENLAFAXINE 100 MG/1
100 TABLET, EXTENDED RELEASE ORAL DAILY
Status: CANCELLED | OUTPATIENT
Start: 2022-02-09

## 2022-02-09 RX ORDER — CETIRIZINE HYDROCHLORIDE 10 MG/1
10 TABLET ORAL DAILY
Status: DISCONTINUED | OUTPATIENT
Start: 2022-02-09 | End: 2022-02-17 | Stop reason: HOSPADM

## 2022-02-09 RX ORDER — METFORMIN HCL 500 MG
1000 TABLET, EXTENDED RELEASE 24 HR ORAL
Status: DISCONTINUED | OUTPATIENT
Start: 2022-02-09 | End: 2022-02-15

## 2022-02-09 RX ORDER — CLONIDINE HYDROCHLORIDE 0.1 MG/1
0.1 TABLET ORAL 2 TIMES DAILY
Status: CANCELLED | OUTPATIENT
Start: 2022-02-09

## 2022-02-09 RX ORDER — BUSPIRONE HYDROCHLORIDE 10 MG/1
20 TABLET ORAL 3 TIMES DAILY
Status: ON HOLD | COMMUNITY
End: 2022-02-16

## 2022-02-09 RX ORDER — VITAMIN B COMPLEX
2000 TABLET ORAL DAILY
Status: DISCONTINUED | OUTPATIENT
Start: 2022-02-09 | End: 2022-02-17 | Stop reason: HOSPADM

## 2022-02-09 RX ORDER — DROPERIDOL 2.5 MG/ML
2.5 INJECTION, SOLUTION INTRAMUSCULAR; INTRAVENOUS ONCE
Status: DISCONTINUED | OUTPATIENT
Start: 2022-02-09 | End: 2022-02-09

## 2022-02-09 RX ORDER — PREGABALIN 100 MG/1
100 CAPSULE ORAL 3 TIMES DAILY
Status: DISCONTINUED | OUTPATIENT
Start: 2022-02-09 | End: 2022-02-17 | Stop reason: HOSPADM

## 2022-02-09 RX ORDER — ONDANSETRON 4 MG/1
4 TABLET, ORALLY DISINTEGRATING ORAL EVERY 8 HOURS PRN
Status: DISCONTINUED | OUTPATIENT
Start: 2022-02-09 | End: 2022-02-17 | Stop reason: HOSPADM

## 2022-02-09 RX ORDER — FERROUS SULFATE 325(65) MG
325 TABLET ORAL
Status: ON HOLD | COMMUNITY
End: 2022-02-16

## 2022-02-09 RX ORDER — ONDANSETRON 4 MG/1
4 TABLET, ORALLY DISINTEGRATING ORAL EVERY 30 MIN PRN
Status: DISCONTINUED | OUTPATIENT
Start: 2022-02-09 | End: 2022-02-09 | Stop reason: HOSPADM

## 2022-02-09 RX ORDER — BUSPIRONE HYDROCHLORIDE 15 MG/1
15 TABLET ORAL 3 TIMES DAILY
Status: CANCELLED | OUTPATIENT
Start: 2022-02-09

## 2022-02-09 RX ADMIN — FENTANYL CITRATE 25 MCG: 50 INJECTION, SOLUTION INTRAMUSCULAR; INTRAVENOUS at 01:13

## 2022-02-09 RX ADMIN — HYDROMORPHONE HYDROCHLORIDE 0.5 MG: 1 INJECTION, SOLUTION INTRAMUSCULAR; INTRAVENOUS; SUBCUTANEOUS at 00:09

## 2022-02-09 RX ADMIN — FENTANYL CITRATE 25 MCG: 50 INJECTION, SOLUTION INTRAMUSCULAR; INTRAVENOUS at 00:21

## 2022-02-09 RX ADMIN — DESVENLAFAXINE SUCCINATE 100 MG: 100 TABLET, EXTENDED RELEASE ORAL at 12:05

## 2022-02-09 RX ADMIN — ONDANSETRON 4 MG: 2 INJECTION INTRAMUSCULAR; INTRAVENOUS at 00:40

## 2022-02-09 RX ADMIN — LIDOCAINE HYDROCHLORIDE 30 ML: 20 SOLUTION ORAL; TOPICAL at 04:57

## 2022-02-09 RX ADMIN — PREGABALIN 100 MG: 100 CAPSULE ORAL at 18:22

## 2022-02-09 RX ADMIN — LURASIDONE HYDROCHLORIDE 40 MG: 40 TABLET, FILM COATED ORAL at 16:57

## 2022-02-09 RX ADMIN — Medication 2000 UNITS: at 12:04

## 2022-02-09 RX ADMIN — PROPRANOLOL HYDROCHLORIDE 10 MG: 10 TABLET ORAL at 18:22

## 2022-02-09 RX ADMIN — HYDROXYCHLOROQUINE SULFATE 200 MG: 200 TABLET, FILM COATED ORAL at 12:06

## 2022-02-09 RX ADMIN — SODIUM CHLORIDE, POTASSIUM CHLORIDE, SODIUM LACTATE AND CALCIUM CHLORIDE: 600; 310; 30; 20 INJECTION, SOLUTION INTRAVENOUS at 00:10

## 2022-02-09 RX ADMIN — FENTANYL CITRATE 50 MCG: 50 INJECTION, SOLUTION INTRAMUSCULAR; INTRAVENOUS at 00:01

## 2022-02-09 RX ADMIN — DIPHENHYDRAMINE HYDROCHLORIDE 25 MG: 50 INJECTION, SOLUTION INTRAMUSCULAR; INTRAVENOUS at 01:06

## 2022-02-09 RX ADMIN — FENTANYL CITRATE 25 MCG: 50 INJECTION, SOLUTION INTRAMUSCULAR; INTRAVENOUS at 00:38

## 2022-02-09 RX ADMIN — FENTANYL CITRATE 25 MCG: 50 INJECTION, SOLUTION INTRAMUSCULAR; INTRAVENOUS at 00:52

## 2022-02-09 RX ADMIN — PROPRANOLOL HYDROCHLORIDE 10 MG: 10 TABLET ORAL at 13:28

## 2022-02-09 RX ADMIN — PREGABALIN 100 MG: 100 CAPSULE ORAL at 12:05

## 2022-02-09 RX ADMIN — METFORMIN ER 500 MG 1000 MG: 500 TABLET ORAL at 16:57

## 2022-02-09 RX ADMIN — DIPHENHYDRAMINE HYDROCHLORIDE 25 MG: 50 INJECTION, SOLUTION INTRAMUSCULAR; INTRAVENOUS at 00:49

## 2022-02-09 RX ADMIN — BUSPIRONE HYDROCHLORIDE 20 MG: 10 TABLET ORAL at 20:19

## 2022-02-09 RX ADMIN — BUSPIRONE HYDROCHLORIDE 20 MG: 10 TABLET ORAL at 12:05

## 2022-02-09 RX ADMIN — HYDROXYCHLOROQUINE SULFATE 200 MG: 200 TABLET, FILM COATED ORAL at 20:19

## 2022-02-09 ASSESSMENT — ENCOUNTER SYMPTOMS: SEIZURES: 0

## 2022-02-09 NOTE — OR NURSING
Pt will be going to the ED. Pt would like something to eat. The following message sent to Dr. Aguilar (GI Fellow):  Nevin Enriquejay ([id number]) will be staying in our ED tonight. She was wondering if she could get an order to advance her diet so she could eat and drink something.

## 2022-02-09 NOTE — SAFE
Nevin Alvarado  February 9, 2022  SAFE Note    Critical Safety Issues: Patient with a significant history of ingesting inedible and sharp objects has had a recent escalation in frequency of ingestions.  Yesterday, she had paperclips removed and today she swallowed two wires from face masks.  Patient does not feel safe to return to her group home tonight.        Current Suicidal Ideation/Self-Injurious Concerns/Methods: Ingestion sharp objects.      Current or Historical Inappropriate Sexual Behavior: No      Current or Historical Aggression/Homicidal Ideation: None - N/A      Triggers: Stress and anxiety.      Updated care team: Yes.      For additional details see full LMHP assessment.       Kiah López, LP

## 2022-02-09 NOTE — ED NOTES
[facility] ED Mental Health Handoff Note:       Brief HPI:  This is a 31 year old female initially seen by Dr. Richmond.  See initial ED Provider note for full details of the presentation. Interval history is pertinent for patient went for EGD with GI; metal mask wires removed successfully. Patient brought to [facility] ED for behavioral health assessment.    Home meds reviewed and ordered/administered: No    Medically stable for inpatient mental health admission: Yes.    Evaluated by mental health: No. Patient is clinically sober and awaiting evaluation for disposition.    Safety concerns: At the time I received sign out, patient requires 1:1 monitoring to prevent foreign body ingestions.    Hold Status:  Active Orders   N/A       Exam:   Patient Vitals for the past 24 hrs:   BP Temp Temp src Pulse Resp SpO2 Weight   02/09/22 0130 132/82 -- -- 91 18 96 % --   02/09/22 0115 (!) 153/98 98.2  F (36.8  C) Oral 88 18 97 % --   02/09/22 0100 (!) 138/92 -- -- 89 16 97 % --   02/09/22 0045 (!) 145/90 -- -- 90 18 98 % --   02/09/22 0030 (!) 130/90 98  F (36.7  C) Oral 91 18 95 % --   02/09/22 0015 (!) 141/91 -- -- 97 20 97 % --   02/09/22 0010 (!) 141/63 97.9  F (36.6  C) Oral 102 20 100 % --   02/08/22 2206 (!) 142/94 98.5  F (36.9  C) Oral 85 16 96 % --   02/08/22 1826 (!) 144/84 98.7  F (37.1  C) Oral 90 16 96 % 135.2 kg (298 lb)       ED Course:    Medications - No data to display         DEC assessment completed with  recommending admission to behavioral health.  See separate DEC assessment note for details on the assessment.    Patient was signed out to the oncoming provider, Dr. Greer      Impression:    ICD-10-CM    1. Swallowed foreign body, subsequent encounter  T18.9XXD Case Request: ESOPHAGOGASTRODUODENOSCOPY (EGD)     Case Request: ESOPHAGOGASTRODUODENOSCOPY (EGD)       Plan:    1. Awaiting inpatient mental health admission/transfer.  Patient admitted voluntary.  Hold not indicated at this  time.      RESULTS:   Results for orders placed or performed during the hospital encounter of 02/08/22 (from the past 24 hour(s))   Urine Drugs of Abuse Screen     Status: Normal    Collection Time: 02/08/22  7:07 PM    Narrative    The following orders were created for panel order Urine Drugs of Abuse Screen.  Procedure                               Abnormality         Status                     ---------                               -----------         ------                     Drug abuse screen 1 urin...[[id number]]  Normal              Final result                 Please view results for these tests on the individual orders.   Drug abuse screen 1 urine (ED)     Status: Normal    Collection Time: 02/08/22  7:07 PM   Result Value Ref Range    Amphetamines Urine Screen Negative Screen Negative    Barbiturates Urine Screen Negative Screen Negative    Benzodiazepines Urine Screen Negative Screen Negative    Cannabinoids Urine Screen Negative Screen Negative    Cocaine Urine Screen Negative Screen Negative    Opiates Urine Screen Negative Screen Negative   XR Abdomen 1 View     Status: None    Collection Time: 02/08/22  7:37 PM    Narrative    EXAM: XR ABDOMEN 1 VIEW  LOCATION: [facility]  DATE/TIME: 2/8/2022 7:28 PM    INDICATION: Swallowed foreign body.  COMPARISON: Chest x-ray 02/08/2022.      Impression    IMPRESSION: There are 2 metallic wires each measuring approximately 1.2 cm at the upper mid abdomen extending towards the left approximating the position of the stomach. This is partially included for imaging on the comparison chest x-ray. These are   compatible with swallowed foreign bodies. Cholecystectomy. No bowel obstruction. No visible free air.    XR Chest 1 View     Status: None    Collection Time: 02/08/22  7:38 PM    Narrative    EXAM: XR CHEST 1 VIEW  LOCATION: [facility]  DATE/TIME: 2/8/2022 7:28  PM    INDICATION: Swallowed foreign body.  COMPARISON: None.      Impression    IMPRESSION: No acute cardiopulmonary disease identified. Included imaging of the upper abdomen demonstrates a linear metallic foreign body that is partially included for imaging. It is at least 1.1 cm. This could reside within the gastric lumen.   Asymptomatic COVID-19 Virus (Coronavirus) by PCR Nasopharyngeal     Status: Normal    Collection Time: 02/08/22  9:20 PM    Specimen: Nasopharyngeal; Swab   Result Value Ref Range    SARS CoV2 PCR Negative Negative    Narrative    Testing was performed using the [facility]  SARS-CoV-2 & Influenza A/B Assay on the [employer]  Angi  System.  This test should be ordered for the detection of SARS-COV-2 in individuals who meet SARS-CoV-2 clinical and/or epidemiological criteria. Test performance is unknown in asymptomatic patients.  This test is for in vitro diagnostic use under the FDA EUA for laboratories certified under CLIA to perform moderate and/or high complexity testing. This test has not been FDA cleared or approved.  A negative test does not rule out the presence of PCR inhibitors in the specimen or target RNA in concentration below the limit of detection for the assay. The possibility of a false negative should be considered if the patient's recent exposure or clinical presentation suggests COVID-19.  [facility] are certified under the Clinical Laboratory Improvement Amendments of 1988 (CLIA-88) as qualified to perform moderate and/or high complexity laboratory testing.   CBC with platelets differential     Status: Abnormal    Collection Time: 02/08/22  9:54 PM    Narrative    The following orders were created for panel order CBC with platelets differential.  Procedure                               Abnormality         Status                     ---------                               -----------         ------                     CBC with platelets and d...[[id number]]  Abnormal             Final result                 Please view results for these tests on the individual orders.   Basic metabolic panel     Status: Abnormal    Collection Time: 02/08/22  9:54 PM   Result Value Ref Range    Sodium 140 133 - 144 mmol/L    Potassium 4.8 3.4 - 5.3 mmol/L    Chloride 108 94 - 109 mmol/L    Carbon Dioxide (CO2) 24 20 - 32 mmol/L    Anion Gap 8 3 - 14 mmol/L    Urea Nitrogen 17 7 - 30 mg/dL    Creatinine 0.56 0.52 - 1.04 mg/dL    Calcium 9.5 8.5 - 10.1 mg/dL    Glucose 122 (H) 70 - 99 mg/dL    GFR Estimate >90 >60 mL/min/1.73m2   HCG qualitative Blood     Status: Normal    Collection Time: 02/08/22  9:54 PM   Result Value Ref Range    hCG Serum Qualitative Negative Negative   CBC with platelets and differential     Status: Abnormal    Collection Time: 02/08/22  9:54 PM   Result Value Ref Range    WBC Count 14.9 (H) 4.0 - 11.0 10e3/uL    RBC Count 4.40 3.80 - 5.20 10e6/uL    Hemoglobin 12.7 11.7 - 15.7 g/dL    Hematocrit 38.3 35.0 - 47.0 %    MCV 87 78 - 100 fL    MCH 28.9 26.5 - 33.0 pg    MCHC 33.2 31.5 - 36.5 g/dL    RDW 13.9 10.0 - 15.0 %    Platelet Count 300 150 - 450 10e3/uL    % Neutrophils 76 %    % Lymphocytes 16 %    % Monocytes 8 %    % Eosinophils 0 %    % Basophils 0 %    % Immature Granulocytes 0 %    NRBCs per 100 WBC 0 <1 /100    Absolute Neutrophils 11.3 (H) 1.6 - 8.3 10e3/uL    Absolute Lymphocytes 2.3 0.8 - 5.3 10e3/uL    Absolute Monocytes 1.2 0.0 - 1.3 10e3/uL    Absolute Eosinophils 0.0 0.0 - 0.7 10e3/uL    Absolute Basophils 0.0 0.0 - 0.2 10e3/uL    Absolute Immature Granulocytes 0.1 <=0.4 10e3/uL    Absolute NRBCs 0.0 10e3/uL   Extra Tube     Status: None    Collection Time: 02/08/22 10:06 PM    Narrative    The following orders were created for panel order Extra Tube.  Procedure                               Abnormality         Status                     ---------                               -----------         ------                     Extra Blue Top Tube[[id number]]                               Final result                 Please view results for these tests on the individual orders.   Extra Blue Top Tube     Status: None    Collection Time: 02/08/22 10:06 PM   Result Value Ref Range    Hold Specimen JI              MD Hector Mitchell Barrett Parker, MD  02/09/22 0763       oDnavon Young MD  02/09/22 0849

## 2022-02-09 NOTE — OR NURSING
Discharge Inst-Standard


Discharge Medications


New, Converted or Re-Newed RX:  Transmitted to Pharmacy





Patient Instructions/Follow Up


Plan of Care/Instructions/FU:  


Hold meloxicam for 3 more days.


Follow up with Dr. Cardenas in 2-3 weeks


Will need repeat colonoscopy in 3 months or sooner if condition changes.


Activity as Tolerated:  Yes


Discharge Diet:  No Restrictions








AMARA MANZO Feb 7, 2017 14:37 Pt to [facility] from [facility] unit via ambulance. Pt oriented x4. Pt denies nausea but c/o upper abdominal pain. Pt up to void. Pt seen by necessary doctors and nurses. Pt to OR at this time.

## 2022-02-09 NOTE — ANESTHESIA POSTPROCEDURE EVALUATION
Patient: Nevin Alvarado    Procedure: Procedure(s):  ESOPHAGOGASTRODUODENOSCOPY (EGD), foreign body removal       Diagnosis:Swallowed foreign body, subsequent encounter [T18.9XXD]  Diagnosis Additional Information: No value filed.    Anesthesia Type:  General    Note:  Disposition: Inpatient   Postop Pain Control: Uneventful            Sign Out: Well controlled pain   PONV:    Neuro/Psych: Uneventful            Sign Out: Acceptable/Baseline neuro status   Airway/Respiratory: Uneventful            Sign Out: Acceptable/Baseline resp. status   CV/Hemodynamics: Uneventful            Sign Out: Acceptable CV status; No obvious hypovolemia; No obvious fluid overload   Other NRE: NONE   DID A NON-ROUTINE EVENT OCCUR?            Last vitals:  Vitals Value Taken Time   /92 02/09/22 0100   Temp 36.7  C (98  F) 02/09/22 0030   Pulse 89 02/09/22 0100   Resp 18 02/09/22 0045   SpO2 92 % 02/09/22 0102   Vitals shown include unvalidated device data.    Electronically Signed By: Mayte Tran MD  February 9, 2022  1:02 AM

## 2022-02-09 NOTE — ED NOTES
Collateral obtained from [facility]  [facility] RN Supervisor    MDD, BPD, eating disorder, chronic PTSD, Anxiety  Attempted suicide 3x this past week. Swallows sharp objects, with plan to do it again.   Saw psychiatrist today, and this is what triggered her current SI with plan.   Hx of inpatient hospitalizations.  Lives in  which only has one staff member: Susan Moran [phone]  Guardianship: Janie [phone]

## 2022-02-09 NOTE — ED PROVIDER NOTES
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  Pause for the Cause: Done             Total Sedation Time: See general   anesthesia documentation  _______________________________________________________________________________     Procedure:             Upper GI endoscopy  Indications:           Foreign body in the stomach  Providers:             Lyndsey Croft MD, Mario Aguilar MD  Patient Profile:       31 yof with MDD/CANDICE, recurrent foreign body ingestion,                          history of  esophageal perforation, morbid obesity,                           presenting with foreign body ingestion, will proceed                          to EGD  Referring MD:            Medicines:             General Anesthesia  Complications:         No immediate complications.  _______________________________________________________________________________  Procedure:             Pre-Anesthesia Assessment:                         - Prior to the procedure, a History and Physical was                          performed, and patient medications and allergies were                          reviewed. The patient is unable to give consent                          secondary to the patient being legally incompetent to                          consent. The risks and benefits of the procedure and                          the sedation options and risks were discussed with the                          patient's guardian. All questions were answered and                          informed consent was obtained . Patient identification                          and proposed procedure were verified by the physician                          and the nurse in the pre-procedure area. Mental Status                          Examination: anxious. Airway Examination: normal                          oropharyngeal airway and neck mobility. Respiratory                          Examination: clear to auscultation. CV Examination:                          normal. Prophylactic Antibiotics: The patient does not                          require prophylactic antibiotics. Prior                          Anticoagulants: The patient has taken no anticoagulant                          or antiplatelet agents. ASA Grade Assessment: II - A                          patient with mild systemic disease. After reviewing                          the risks and benefits, the patient was deemed in                          satisfactory condition to undergo the procedure. The                          anesthesia plan was to use gen eral  anesthesia.                          Immediately prior to administration of medications,                          the patient was re-assessed for adequacy to receive                          sedatives. The heart rate, respiratory rate, oxygen                          saturations, blood pressure, adequacy of pulmonary                          ventilation, and response to care were monitored                          throughout the procedure. The physical status of the                          patient was re-assessed after the procedure.                         After obtaining informed consent, the endoscope was                          passed under direct vision. Throughout the procedure,                          the patient's blood pressure, pulse, and oxygen                          saturations were monitored continuously. The Endoscope                          was introduced through the mouth, and advanced to the                          second part of duodenum. The upper GI endo scopy was                          accomplished without difficulty. The patient tolerated                          the procedure well.                                                                                   Findings:       One benign-appearing, intrinsic mild stenosis was found. The stenosis        was traversed easily. This was identified as the likely site of a past        perforation.       Localized moderate mucosal changes characterized by nodularity and        scarring were found in the middle third of the esophagus.       Two mask wires/twist ties were found in the gastric body. Removal was        accomplished with a snare and a rat tooth forceps.       Scattered mild mucosal changes characterized by erosion were found in        the gastric body, more than likely secondary to foreign bodies.       The duodenal bulb, first portion of the duodenum and second portion of        the duodenum were normal.                                                                                     Moderate Sedation:       N/A General Anesthesia  Impression:            - Benign-appearing esophageal stenosis.                         - Nodular, scarred mucosa in the esophagus.                         - Two mask wires/twist ties were found in the stomach.                          Removal was successful.                         - Scattered eroded mucosa in the gastric body.                         - Normal duodenal bulb, first portion of the duodenum                          and second portion of the duodenum.  Recommendation:        - Transport patient to ER on [facility] for further                          monitoring and cares                         - Recommend engagement with /inpatient psych                         - CLD, then ADAT                         - PRN viscous lidocaine/GI cocktail for possible                          oropharyngeal pain (patient has had two EGDs and has                          been intubated twice in last 24 hours)                          - [facility] will sign off, please reach out with                          questions or concerns                                                                                     Electronically Signed by Dr. Croft  ______________________  Lyndsey Croft MD  2/9/2022 8:56:45 AM  I was physically present for the entire viewing portion of the exam.  __________________________  Signature of teaching physician  Amairani/Jennifer Croft MD    ________________________  Mario Aguilar MD  Number of Addenda: 0    Note Initiated On: 2/8/2022 9:26 PM  Scope In:  Scope Out:     XR Chest 1 View     Status: None    Narrative    EXAM: XR CHEST 1 VIEW  LOCATION: [facility]  DATE/TIME: 2/8/2022 7:28 PM    INDICATION: Swallowed foreign body.  COMPARISON: None.      Impression    IMPRESSION: No acute cardiopulmonary disease identified. Included  imaging of the upper abdomen demonstrates a linear metallic foreign body that is partially included for imaging. It is at least 1.1 cm. This could reside within the gastric lumen.   XR Abdomen 1 View     Status: None    Narrative    EXAM: XR ABDOMEN 1 VIEW  LOCATION: [facility]  DATE/TIME: 2/8/2022 7:28 PM    INDICATION: Swallowed foreign body.  COMPARISON: Chest x-ray 02/08/2022.      Impression    IMPRESSION: There are 2 metallic wires each measuring approximately 1.2 cm at the upper mid abdomen extending towards the left approximating the position of the stomach. This is partially included for imaging on the comparison chest x-ray. These are   compatible with swallowed foreign bodies. Cholecystectomy. No bowel obstruction. No visible free air.    Asymptomatic COVID-19 Virus (Coronavirus) by PCR Nasopharyngeal     Status: Normal    Specimen: Nasopharyngeal; Swab   Result Value Ref Range    SARS CoV2 PCR Negative Negative    Narrative    Testing was performed using the [facility]  SARS-CoV-2 & Influenza A/B Assay on the [facility]  Angi  System.  This test should be ordered for the detection of SARS-COV-2 in individuals who meet SARS-CoV-2 clinical and/or epidemiological criteria. Test performance is unknown in asymptomatic patients.  This test is for in vitro diagnostic use under the FDA EUA for laboratories certified under CLIA to perform moderate and/or high complexity testing. This test has not been FDA cleared or approved.  A negative test does not rule out the presence of PCR inhibitors in the specimen or target RNA in concentration below the limit of detection for the assay. The possibility of a false negative should be considered if the patient's recent exposure or clinical presentation suggests COVID-19.  [facility] are certified under the Clinical Laboratory Improvement Amendments of 1988 (CLIA-88) as qualified to perform moderate and/or high  complexity laboratory testing.   Drug abuse screen 1 urine (ED)     Status: Normal   Result Value Ref Range    Amphetamines Urine Screen Negative Screen Negative    Barbiturates Urine Screen Negative Screen Negative    Benzodiazepines Urine Screen Negative Screen Negative    Cannabinoids Urine Screen Negative Screen Negative    Cocaine Urine Screen Negative Screen Negative    Opiates Urine Screen Negative Screen Negative   Basic metabolic panel     Status: Abnormal   Result Value Ref Range    Sodium 140 133 - 144 mmol/L    Potassium 4.8 3.4 - 5.3 mmol/L    Chloride 108 94 - 109 mmol/L    Carbon Dioxide (CO2) 24 20 - 32 mmol/L    Anion Gap 8 3 - 14 mmol/L    Urea Nitrogen 17 7 - 30 mg/dL    Creatinine 0.56 0.52 - 1.04 mg/dL    Calcium 9.5 8.5 - 10.1 mg/dL    Glucose 122 (H) 70 - 99 mg/dL    GFR Estimate >90 >60 mL/min/1.73m2   HCG qualitative Blood     Status: Normal   Result Value Ref Range    hCG Serum Qualitative Negative Negative   CBC with platelets and differential     Status: Abnormal   Result Value Ref Range    WBC Count 14.9 (H) 4.0 - 11.0 10e3/uL    RBC Count 4.40 3.80 - 5.20 10e6/uL    Hemoglobin 12.7 11.7 - 15.7 g/dL    Hematocrit 38.3 35.0 - 47.0 %    MCV 87 78 - 100 fL    MCH 28.9 26.5 - 33.0 pg    MCHC 33.2 31.5 - 36.5 g/dL    RDW 13.9 10.0 - 15.0 %    Platelet Count 300 150 - 450 10e3/uL    % Neutrophils 76 %    % Lymphocytes 16 %    % Monocytes 8 %    % Eosinophils 0 %    % Basophils 0 %    % Immature Granulocytes 0 %    NRBCs per 100 WBC 0 <1 /100    Absolute Neutrophils 11.3 (H) 1.6 - 8.3 10e3/uL    Absolute Lymphocytes 2.3 0.8 - 5.3 10e3/uL    Absolute Monocytes 1.2 0.0 - 1.3 10e3/uL    Absolute Eosinophils 0.0 0.0 - 0.7 10e3/uL    Absolute Basophils 0.0 0.0 - 0.2 10e3/uL    Absolute Immature Granulocytes 0.1 <=0.4 10e3/uL    Absolute NRBCs 0.0 10e3/uL   Extra Blue Top Tube     Status: None   Result Value Ref Range    Hold Specimen JI    Urine Drugs of Abuse Screen     Status: Normal    Narrative     The following orders were created for panel order Urine Drugs of Abuse Screen.  Procedure                               Abnormality         Status                     ---------                               -----------         ------                     Drug abuse screen 1 urin...[[id number]]  Normal              Final result                 Please view results for these tests on the individual orders.   CBC with platelets differential     Status: Abnormal    Narrative    The following orders were created for panel order CBC with platelets differential.  Procedure                               Abnormality         Status                     ---------                               -----------         ------                     CBC with platelets and d...[[id number]]  Abnormal            Final result                 Please view results for these tests on the individual orders.   Extra Tube     Status: None    Narrative    The following orders were created for panel order Extra Tube.  Procedure                               Abnormality         Status                     ---------                               -----------         ------                     Extra Blue Top Tube[[id number]]                              Final result                 Please view results for these tests on the individual orders.     Medications   lidocaine (viscous) (XYLOCAINE) 2 % 15 mL, alum & mag hydroxide-simethicone (MAALOX) 15 mL GI Cocktail (30 mLs Oral Given 2/9/22 5152)        Assessments & Plan (with Medical Decision Making)   31 year old female with history of anxiety and self-injurious behavior to the emergency department with recurrent foreign body ingestion.  The patient reports that she ingested 2 wires from a surgical mask this evening due to worsening anxiety.  She saw her psychiatrist today and was prescribed propranolol which she has not yet taken.  The patient has no abdominal pain or tenderness.  She has normal vital signs.   Abdominal radiograph reveals 2 metallic foreign bodies that appear consistent with mask wires as reported.  The patient underwent endoscopy yesterday for ingested foreign body as well.  GI consulted.  Patient transferred to [facility] emergency department with plan to proceed to the OR for foreign body removal under general anesthesia.  The patient denies suicidal ideation but does endorse worsening anxiety and resultant foreign body ingestion.    I have reviewed the nursing notes. I have reviewed the findings, diagnosis, plan and need for follow up with the patient.    New Prescriptions    No medications on file       Final diagnoses:   Foreign body ingestion, initial encounter   Borderline personality disorder (H)   Anxiety     Chart documentation was completed with Dragon voice-recognition software. Even though reviewed, this chart may still contain some grammatical, spelling, and word errors.     --  Bird Richmond Md  [facility] EMERGENCY DEPARTMENT  2/8/2022     Bird Richmond MD  02/09/22 0945

## 2022-02-09 NOTE — PROGRESS NOTES
Brief Social Work Note    Expected Discharge Date:  TBD     Concerns to be Addressed:    Pt request    Additional Information:    0400 SW spoke with ED RN Sammi who reported that pt requested to speak with SW.    2305 SW met with pt at bedside, introduced self and asked how SW could help. At the time pt had a 1:1 sitter who requested pt's cell phone per policy. Pt refused and when sitter left asked that a different sitter be assigned. Pt also requested to go back to  ED. SW explained that SW would make both requests, but SW what could be done and when it would happen.    Pt also explained that she lives in a group home and that her doctor said she needed to be away from her job for at least 4 weeks in order to improve her mental health. She said she was unsure if she qualified for FMLA as she had only been with her employer for 6 months, but she did know that she couldn't lose her job due to her mental health. She also reported that she spoke with her [facility] [employer] worker Kanchan Ceja ([phone]) and would need SW to contact her for paperwork to maintain her spot at the group home.     SW suggested that she contact her employer HR for FMLA forms. SW explained that her doctor would need to complete the forms and send them in. SW agreed to contact her financial worker and provide update when available.    SW attempted to contact pt's financial worker and LVM asking for a call back.    SW attempted to provide update to pt but she was not in her room at the time.    SW will continue to follow as needed.    MYLES Araya, [facility]  ED/OBS   [facility]  Phone: [phone]  Pager: [phone]  Fax: [phone]     On-call pager, [phone], 4:00 pm to midnight

## 2022-02-09 NOTE — OR NURSING
Pt to [facility] from the OR. Pt awake and oriented x4. Pt was a little groggy at first, no longer groggy. Vitals are WNL's and Pt stable. Pt reports pain in upper abdomen but less than pre-op pain. Pt c/o nausea and being itchy. Pt given benadryl for this and zofran for nausea. Dr. Tran (Anesthesiologist) called for a sign out and was updated. Pt has met [facility] transfer criteria.
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
## 2022-02-09 NOTE — ED NOTES
Patient resting in bed with eyes closed. VSS. Arouseable to verbal stimuli. Calm & cooperative w/ room search. G;asses, cell phone, water remain at bedside. 1:1 observation in place.   [Joint Pain] : joint pain [Negative] : Heme/Lymph

## 2022-02-09 NOTE — ANESTHESIA CARE TRANSFER NOTE
Patient: Nevin Alvarado    Procedure: Procedure(s):  ESOPHAGOGASTRODUODENOSCOPY (EGD), foreign body removal       Diagnosis: Swallowed foreign body, subsequent encounter [T18.9XXD]  Diagnosis Additional Information: No value filed.    Anesthesia Type:   General     Note:    Oropharynx: oropharynx clear of all foreign objects and spontaneously breathing  Level of Consciousness: awake  Oxygen Supplementation: room air    Independent Airway: airway patency satisfactory and stable  Dentition: dentition unchanged  Vital Signs Stable: post-procedure vital signs reviewed and stable  Report to RN Given: handoff report given  Patient transferred to: PACU    Handoff Report: Identifed the Patient, Identified the Reponsible Provider, Reviewed the pertinent medical history, Discussed the surgical course, Reviewed Intra-OP anesthesia mangement and issues during anesthesia, Set expectations for post-procedure period and Allowed opportunity for questions and acknowledgement of understanding      Vitals:  Vitals Value Taken Time   /63 02/09/22 0010   Temp     Pulse 102 02/09/22 0010   Resp 16    SpO2 98 % 02/09/22 0011   Vitals shown include unvalidated device data.    Electronically Signed By: Clarita De La Rosa CRNA, HU VAZQUEZ  February 9, 2022  12:12 AM
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
## 2022-02-09 NOTE — ED TRIAGE NOTES
Patient transferred to ED from PACU, she had a EGD to remove a foreign body. She had swallowed two wires from her face mask which were removed during the procedure without incident. Upon arrival the patient is A&Ox4, vitals stable, c/o abdominal pain.

## 2022-02-09 NOTE — BRIEF OP NOTE
Gastroenterology Endoscopy Suite Brief Operative Note    Procedure:  Upper endoscopy   Post-operative diagnosis:  Ingested foreign body   Staff Physician:  Lyndsey Mendoza   Fellow/Assistant(s):  Mario Aguilar     Specimens:  Please see final procedure note for further details.   Findings:  - Nodular, fibrotic mucosa with luminal diameter variance in mid-esophagus consistent with healed area of prior perforation  - Normal GEJ  - Two linear white metallic/plastic objects projecting across gastric body  - Scattered superficial mucosal abrasions in stomach, scant blood   Complications:  None   Condition:  Stable to PACU   Recommendations  Diet:  Clear liquid diet, then ADAT  Remainder of cares per primary, recommend engagement with         Mario Aguilar MD, PGY4  Gastroenterology Fellow  [facility]  See AMCOM/Qgenda for GI on-call information    Procedure performed with Dr. Mendoza.

## 2022-02-09 NOTE — ED NOTES
Advised of protocol to remove cell phone. Pt refused.  paged per request. Spoke w/ [facility] at [facility]. No ETA for bed at this time.

## 2022-02-09 NOTE — ED NOTES
Pt alerted sitter to get writer. At this time, pt c/o left sided, reproducible, pleuritic chest pain that started after she ate. Describes this pain as sharp and 8/10. MD updated.

## 2022-02-09 NOTE — TELEPHONE ENCOUNTER
Patient cleared and ready for behavioral bed placement: Yes   S: 03:47 DEC call, 31/F, [facility], SIB via swallowing foreign objects    B: Hx of BPD, ADHD, depression, PTSD, SIB, recurrent foreign body ingestion, sleep apnea, asthma and obesity.   Pt reportedly swallowed 2 metal wires from a facemask in an attempt to self-harm. Objects have been removed and pt has been medically cleared. Pt also has a hx of inserting foreign objects rectally. 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 Pt lives in a  and has an appointed guardian through [facility]. No reported aggression. No substance use concerns. Hx of multiple Inpt  admissions, most recently November 2020. Ambulates / eats / drinks ok. Covid negative    A: Voluntary - pt has a guardian through [facility]. [facility] only at this time    R: Pt placed on work list until appropriate placement is available

## 2022-02-09 NOTE — PROGRESS NOTES
"Brief Luminal GI Procedural Note:    02/08/22    Subjective:  Nevin Alvarado is a 31 year old female   Chief Complaint   Patient presents with     left neck pain after endoscopy     Active diagnoses this visit:  Neck pain on left side     HPI:    Briefly, 31 yof with multiple psychiatric comorbidities, morbid obesity, recurrent intentional foreign body ingestion (most recent yesterday, 2/7/22 s/p EGD) now presenting again with ingested foreign body. 2x facemask nose wires ingested in the evening of 2/8/22. Plain films confirm 2x metallic linear wires in gastric lumen. No visible free air. Hemodynamically stable, labs WNL. COVID19 negative yesterday and today (2/7, 2/8).     Consent obtained through legal guardian Aaliyah ([email or web address]) via phone.     Most recent endoscopy: 2/7/22  Impression:            - Normal esophagus.                          - Erythematous mucosa in the antrum.                          - A paper clip was found in the stomach and                          successfully removed.                          - No specimens collected.     EGD Findings:  -- nodular, scarred mucosa in mid esophagus at site of previous perforation  -- GEJ normal without injury  -- two linear metallic/plastic objects projecting across gastric body  -- scattered superficial abrasions on gastric mucosa, likely foreign body trauma, scant blood  -- normal appearing pylorus, duodenal bulb, and 2nd portion of duodenum    Outgoing Recommendations:  -- CLD, then ADAT  -- viscous lidocaine, GI cocktail to soothe esophagus and oropharynx (2x EGD in < 24 hours, 2x intubations in < 24 hours)   -- remainder of pain control and overall cares per ED/primary team  -- patient amenable to  evaluation, provided that she wanted to \"get better\" so she could stay safe and eventually get back to work, we would recommend MH evaluation when returns to [facility]  -- luminal GI will sign off, reach out with any questions or " concerns    Mario Aguilar MD, PGY4  Gastroenterology Fellow  [facility]  See AMCOM/[facility] for GI on-call information    Case discussed with Dr. Mendoza, EGD performed with her supervision.

## 2022-02-09 NOTE — TELEPHONE ENCOUNTER
R: per 8 am bed search ([facility] only):  [facility]: @ cap per website  [facility]: @ cap per website  [facility]: @ cap per website  [facility]: @ cap per website  [facility]: @ cap per website  [facility]: @ cap per website    #12 @ cap. Pt to wait in er until a bed is avail       Passed to zulema shift.         jose

## 2022-02-09 NOTE — BRIEF OP NOTE
Gastroenterology Endoscopy Suite Brief Operative Note     Procedure:  Upper endoscopy   Post-operative diagnosis:  Ingested foreign body   Staff Physician:  Lyndsey Mendoza   Fellow/Assistant(s):  Mario Aguilar     Specimens:  Please see final procedure note for further details.   Findings:  - Nodular, fibrotic mucosa with luminal diameter variance in mid-esophagus consistent with healed area of prior perforation  - Normal GEJ  - Two linear white metallic/plastic objects projecting across gastric body  - Scattered superficial mucosal abrasions in stomach, scant blood  - Foreign objects removed with snare and rat tooth forceps without incident   Complications:  None   Condition:  Stable to PACU   Recommendations  Diet:  Clear liquid diet, then ADAT  Remainder of cares per primary, recommend engagement with           Mario Aguilar MD, PGY4  Gastroenterology Fellow  [facility]  See AMCOM/Qgenda for GI on-call information     Procedure performed with Dr. Mendoza.

## 2022-02-09 NOTE — PHARMACY-ADMISSION MEDICATION HISTORY
Admission Medication History Completed by Pharmacy    See [facility] Admission Navigator for allergy information, preferred outpatient pharmacy, prior to admission medications and immunization status.     Medication History Sources:     Surescripts, CareEverywhere, Patient    Changes made to PTA medication list (reason):    Added:   o Propranolol 10 mg tablet - take one tablet daily as needed for anxiety, may repeat dose once.  This is a new prescription prescribed February 2022 that the patient has not filled yet  o Ferrous sulfate 325 mg tablet - take once daily in the morning with breakfast    Deleted:   o Prednisone - therapy complete (5 day course in January 2022)  o Clonidine - patient reports no longer taking this medication. Last fill in Oct. 2021 for 30 day supply    Changed:   o Buspirone 15 mg TID -> 20 mg TID  o Lurasidone 60 mg daily in the evening --> 40 mg daily in the evening    Additional Information:    Patient reports last doses of medications was on 2/7/22.    Propranolol is a new medication prescribed this month for her anxiety. However, she has not been able to get it filled yet.    Prior to Admission medications    Medication Sig Last Dose Taking? Auth Provider   acetaminophen (TYLENOL) 500 MG tablet Take 500-1,000 mg by mouth every 8 hours as needed for mild pain  Past Week at Unknown time Yes Unknown, Entered By History   albuterol (PROAIR HFA/PROVENTIL HFA/VENTOLIN HFA) 108 (90 Base) MCG/ACT inhaler Inhale 2 puffs into the lungs every 6 hours as needed for shortness of breath / dyspnea or wheezing Past Week at Unknown time Yes Nish Saucedo MD   albuterol (PROVENTIL) (2.5 MG/3ML) 0.083% neb solution Take 1 vial (2.5 mg) by nebulization every 4 hours as needed for shortness of breath / dyspnea 1 vial 3 mL or 2.5 mg albuterol by nebulization every 2-4 hours as needed for shortness of breath or wheezing.  Prescribe 1 box. Past Week at Unknown time Yes Nish Saucedo MD   busPIRone (BUSPAR) 10  MG tablet Take 20 mg by mouth 3 times daily 2/7/2022 at PM Yes Unknown, Entered By History   cetirizine (ZYRTEC) 10 MG tablet Take 10 mg by mouth daily 2/7/2022 at AM Yes Unknown, Entered By History   Cholecalciferol (VITAMIN D) 50 MCG (2000 UT) CAPS Take 2,000 Units by mouth daily  2/7/2022 at AM Yes Unknown, Entered By History   desvenlafaxine (PRISTIQ) 100 MG 24 hr tablet Take 1 tablet (100 mg) by mouth every morning 2/7/2022 at AM Yes Reported, Patient   ferrous sulfate (FEROSUL) 325 (65 Fe) MG tablet Take 325 mg by mouth daily (with breakfast) 2/7/2022 at AM Yes Unknown, Entered By History   hydroxychloroquine (PLAQUENIL) 200 MG tablet Take 200 mg by mouth 2 times daily 2/7/2022 at AM Yes Reported, Patient   lurasidone (LATUDA) 40 MG TABS tablet Take 40 mg by mouth daily (with dinner)  2/7/2022 at Evening Yes Reported, Patient   metFORMIN (GLUCOPHAGE-XR) 500 MG 24 hr tablet Take 1,000 mg by mouth daily (with dinner)  2/7/2022 at 17:00 Yes Unknown, Entered By History   methylcellulose (CITRUCEL) powder Take by mouth daily 2/7/2022 Yes Reported, Patient   ondansetron (ZOFRAN-ODT) 4 MG ODT tab Take 4 mg by mouth every 8 hours as needed for nausea More than a month at Unknown time Yes Unknown, Entered By History   pregabalin (LYRICA) 100 MG capsule Take 100 mg by mouth 3 times daily  2/7/2022 at 19:00 Yes Reported, Patient   propranolol (INDERAL) 10 MG tablet Take 10 mg by mouth . Take one tablet (10 mg) daily as needed for anxiety, may repeat once if needed  at Has not started yet Yes Unknown, Entered By History   valACYclovir (VALTREX) 1000 mg tablet Take 2 tablets by mouth two times daily for one day. Use as needed at onset of cold sore.  More than a month at Unknown time Yes Unknown, Entered By History   Respiratory Therapy Supplies (NEBULIZER) BRENDAN Nebulizer device.  Albuterol nebulization every 2 hours as needed for shortness of breath or wheezing.   Nish Saucedo MD       Date completed:  02/09/22    Medication history completed by: Estrada Harmon [facility]

## 2022-02-09 NOTE — ANESTHESIA PROCEDURE NOTES
Airway       Patient location during procedure: OR       Procedure Start/Stop Times: 2/8/2022 11:28 PM  Staff -        CRNA: Clarita De La Rosa APRN CRNA       Performed By: CRNA  Consent for Airway        Urgency: elective  Indications and Patient Condition       Indications for airway management: isma-procedural       Induction type:RSI       Mask difficulty assessment: 0 - not attempted    Final Airway Details       Final airway type: endotracheal airway       Successful airway: ETT - single  Endotracheal Airway Details        ETT size (mm): 7.0       Cuffed: yes       Successful intubation technique: video laryngoscopy       VL Blade Size: MAC 3       Grade View of Cords: 1       Adjucts: stylet       Position: Right       Measured from: lips       Secured at (cm): 22       Bite Block used: GI Bite block.    Post intubation assessment        Placement verified by: capnometry, equal breath sounds and chest rise        Number of attempts at approach: 1       Secured with: silk tape       Ease of procedure: easy       Dentition: Unchanged

## 2022-02-09 NOTE — PROGRESS NOTES
"Brief Luminal GI Procedural Note:     02/08/22     HPI:     Briefly, 31 yof with multiple psychiatric comorbidities, morbid obesity, recurrent intentional foreign body ingestion (most recent yesterday, 2/7/22 s/p EGD) now presenting again with ingested foreign body. 2x facemask nose wires ingested in the evening of 2/8/22. Plain films confirm 2x metallic linear wires in gastric lumen. No visible free air. Hemodynamically stable, labs WNL. COVID19 negative yesterday and today (2/7, 2/8).      Consent obtained through legal guardian Aaliyah ([employer]) via phone.      Most recent endoscopy: 2/7/22  Impression:            - Normal esophagus.                          - Erythematous mucosa in the antrum.                          - A paper clip was found in the stomach and                          successfully removed.                          - No specimens collected.      EGD Findings:  -- nodular, scarred mucosa in mid esophagus at site of previous perforation  -- GEJ normal without injury  -- two linear metallic/plastic objects projecting across gastric body  -- scattered superficial abrasions on gastric mucosa, likely foreign body trauma, scant blood  -- normal appearing pylorus, duodenal bulb, and 2nd portion of duodenum     Outgoing Recommendations:  -- CLD, then ADAT  -- viscous lidocaine, GI cocktail to soothe esophagus and oropharynx (2x EGD in < 24 hours, 2x intubations in < 24 hours)   -- remainder of pain control and overall cares per ED/primary team  -- patient amenable to MH evaluation, provided that she wanted to \"get better\" so she could stay safe and eventually get back to work, we would recommend MH evaluation when [facility]  -- luminal GI will sign off, reach out with any questions or concerns     Mario Aguilar MD, PGY4  Gastroenterology Fellow  [facility]  See AMCOM/[facility] for GI on-call information     Case discussed with Dr. Mendoza, EGD performed with her " supervision.

## 2022-02-09 NOTE — ED NOTES
Bed: ED09  Expected date:   Expected time:   Means of arrival:   Comments:  Nevin LYNN  31yr   From PACU

## 2022-02-10 ENCOUNTER — TELEPHONE (OUTPATIENT)
Dept: BEHAVIORAL HEALTH | Facility: CLINIC | Age: 32
End: 2022-02-10

## 2022-02-10 ENCOUNTER — TELEPHONE (OUTPATIENT)
Dept: BEHAVIORAL HEALTH | Facility: CLINIC | Age: 32
End: 2022-02-10
Payer: COMMERCIAL

## 2022-02-10 PROBLEM — T18.9XXA FOREIGN BODY INGESTION, INITIAL ENCOUNTER: Status: ACTIVE | Noted: 2022-02-10

## 2022-02-10 PROCEDURE — 124N000002 HC R&B MH UMMC

## 2022-02-10 PROCEDURE — 250N000013 HC RX MED GY IP 250 OP 250 PS 637: Performed by: EMERGENCY MEDICINE

## 2022-02-10 PROCEDURE — 250N000013 HC RX MED GY IP 250 OP 250 PS 637: Performed by: STUDENT IN AN ORGANIZED HEALTH CARE EDUCATION/TRAINING PROGRAM

## 2022-02-10 PROCEDURE — 96374 THER/PROPH/DIAG INJ IV PUSH: CPT | Performed by: EMERGENCY MEDICINE

## 2022-02-10 PROCEDURE — 96361 HYDRATE IV INFUSION ADD-ON: CPT | Performed by: EMERGENCY MEDICINE

## 2022-02-10 PROCEDURE — 96376 TX/PRO/DX INJ SAME DRUG ADON: CPT | Performed by: EMERGENCY MEDICINE

## 2022-02-10 PROCEDURE — 96375 TX/PRO/DX INJ NEW DRUG ADDON: CPT | Performed by: EMERGENCY MEDICINE

## 2022-02-10 RX ORDER — OLANZAPINE 10 MG/2ML
10 INJECTION, POWDER, FOR SOLUTION INTRAMUSCULAR 3 TIMES DAILY PRN
Status: DISCONTINUED | OUTPATIENT
Start: 2022-02-10 | End: 2022-02-17 | Stop reason: HOSPADM

## 2022-02-10 RX ORDER — MAGNESIUM HYDROXIDE/ALUMINUM HYDROXICE/SIMETHICONE 120; 1200; 1200 MG/30ML; MG/30ML; MG/30ML
30 SUSPENSION ORAL EVERY 4 HOURS PRN
Status: DISCONTINUED | OUTPATIENT
Start: 2022-02-10 | End: 2022-02-17 | Stop reason: HOSPADM

## 2022-02-10 RX ORDER — HYDROXYZINE HYDROCHLORIDE 25 MG/1
25 TABLET, FILM COATED ORAL EVERY 4 HOURS PRN
Status: DISCONTINUED | OUTPATIENT
Start: 2022-02-10 | End: 2022-02-17 | Stop reason: HOSPADM

## 2022-02-10 RX ORDER — POLYETHYLENE GLYCOL 3350 17 G
2 POWDER IN PACKET (EA) ORAL
Status: DISCONTINUED | OUTPATIENT
Start: 2022-02-10 | End: 2022-02-17 | Stop reason: HOSPADM

## 2022-02-10 RX ORDER — OLANZAPINE 10 MG/1
10 TABLET ORAL 3 TIMES DAILY PRN
Status: DISCONTINUED | OUTPATIENT
Start: 2022-02-10 | End: 2022-02-17 | Stop reason: HOSPADM

## 2022-02-10 RX ORDER — LANOLIN ALCOHOL/MO/W.PET/CERES
3 CREAM (GRAM) TOPICAL
Status: DISCONTINUED | OUTPATIENT
Start: 2022-02-10 | End: 2022-02-17 | Stop reason: HOSPADM

## 2022-02-10 RX ORDER — IBUPROFEN 600 MG/1
600 TABLET, FILM COATED ORAL ONCE
Status: COMPLETED | OUTPATIENT
Start: 2022-02-10 | End: 2022-02-10

## 2022-02-10 RX ORDER — LURASIDONE HYDROCHLORIDE 40 MG/1
40 TABLET, FILM COATED ORAL
Status: DISCONTINUED | OUTPATIENT
Start: 2022-02-10 | End: 2022-02-17 | Stop reason: HOSPADM

## 2022-02-10 RX ORDER — POLYETHYLENE GLYCOL 3350 17 G/17G
17 POWDER, FOR SOLUTION ORAL DAILY PRN
Status: DISCONTINUED | OUTPATIENT
Start: 2022-02-10 | End: 2022-02-17 | Stop reason: HOSPADM

## 2022-02-10 RX ADMIN — BENZOCAINE AND MENTHOL 1 LOZENGE: 15; 3.6 LOZENGE ORAL at 10:35

## 2022-02-10 RX ADMIN — PREGABALIN 100 MG: 100 CAPSULE ORAL at 09:10

## 2022-02-10 RX ADMIN — PREGABALIN 100 MG: 100 CAPSULE ORAL at 14:10

## 2022-02-10 RX ADMIN — IBUPROFEN 600 MG: 600 TABLET ORAL at 17:32

## 2022-02-10 RX ADMIN — Medication 2000 UNITS: at 09:11

## 2022-02-10 RX ADMIN — DESVENLAFAXINE SUCCINATE 100 MG: 100 TABLET, EXTENDED RELEASE ORAL at 10:20

## 2022-02-10 RX ADMIN — PREGABALIN 100 MG: 100 CAPSULE ORAL at 19:43

## 2022-02-10 RX ADMIN — METFORMIN ER 500 MG 1000 MG: 500 TABLET ORAL at 18:19

## 2022-02-10 RX ADMIN — LURASIDONE HYDROCHLORIDE 40 MG: 40 TABLET, FILM COATED ORAL at 19:43

## 2022-02-10 RX ADMIN — BUSPIRONE HYDROCHLORIDE 20 MG: 10 TABLET ORAL at 09:35

## 2022-02-10 RX ADMIN — HYDROXYCHLOROQUINE SULFATE 200 MG: 200 TABLET, FILM COATED ORAL at 19:44

## 2022-02-10 RX ADMIN — BUSPIRONE HYDROCHLORIDE 20 MG: 10 TABLET ORAL at 19:43

## 2022-02-10 RX ADMIN — FERROUS SULFATE TAB 325 MG (65 MG ELEMENTAL FE) 325 MG: 325 (65 FE) TAB at 09:38

## 2022-02-10 RX ADMIN — HYDROXYCHLOROQUINE SULFATE 200 MG: 200 TABLET, FILM COATED ORAL at 09:39

## 2022-02-10 RX ADMIN — BUSPIRONE HYDROCHLORIDE 20 MG: 10 TABLET ORAL at 14:46

## 2022-02-10 RX ADMIN — CETIRIZINE HYDROCHLORIDE 10 MG: 10 TABLET, FILM COATED ORAL at 19:43

## 2022-02-10 NOTE — ED NOTES
[facility] ED Mental Health Handoff Note:       Brief HPI:  This is a 31 year old female signed out to me by Dr. Young.  See initial ED Provider note for full details of the presentation. Plan for psychiatric admission due to this being the fourth episode in the last week or so that she has been ingesting things intentionally to harm herself. She did have an EGD here which removed the metal objects. She has been medically stable. Was requesting multiple times IV medications for anxiety. Her home meds were ordered including her as needed propranolol that she can take up to 2 times a day. She was given this here. We also offered her hydroxyzine which she refused. We discussed with the patient that she would not get IV medications unless she necessitated this which she does not at this time. We also discussed that at this point we are waiting for her inpatient psychiatric bed with the plan was to [place] [facility] [place] an available bed to continue to board the patient. I did discuss the patient with Dr. Garcia who is aware of her transfer over there for boarding when there is a bed available there.    Home meds reviewed and ordered/administered: Yes    Medically stable for inpatient mental health admission: Yes.    Evaluated by mental health: Yes. The recommendation is for inpatient mental health treatment. Bed search in process    Safety concerns: At the time I received sign out, there were no safety concerns.    Hold Status:  Active Orders   N/A            Exam:   Patient Vitals for the past 24 hrs:   BP Temp Temp src Pulse Resp SpO2   02/09/22 1710 (!) 143/77 (!) 96.2  F (35.7  C) Oral 90 18 97 %   02/09/22 0720 129/68 -- -- 82 18 97 %   02/09/22 0130 132/82 -- -- 91 18 96 %   02/09/22 0115 (!) 153/98 98.2  F (36.8  C) Oral 88 18 97 %   02/09/22 0100 (!) 138/92 -- -- 89 16 97 %   02/09/22 0045 (!) 145/90 -- -- 90 18 98 %   02/09/22 0030 (!) 130/90 98  F  (36.7  C) Oral 91 18 95 %   02/09/22 0015 (!) 141/91 -- -- 97 20 97 %   02/09/22 0010 (!) 141/63 97.9  F (36.6  C) Oral 102 20 100 %   02/08/22 2206 (!) 142/94 98.5  F (36.9  C) Oral 85 16 96 %           ED Course:    Medications   hydrOXYzine (ATARAX) tablet 25 mg (25 mg Oral Not Given 2/9/22 0956)   albuterol (PROVENTIL HFA/VENTOLIN HFA) inhaler (has no administration in time range)   busPIRone (BUSPAR) tablet 20 mg (20 mg Oral Given 2/9/22 1205)   cetirizine (zyrTEC) tablet 10 mg (10 mg Oral Not Given 2/9/22 1324)   Vitamin D3 (CHOLECALCIFEROL) tablet 2,000 Units (2,000 Units Oral Given 2/9/22 1204)   desvenlafaxine (PRISTIQ) 24 hr tablet 100 mg (100 mg Oral Given 2/9/22 1205)   ferrous sulfate (FEROSUL) tablet 325 mg (has no administration in time range)   hydroxychloroquine (PLAQUENIL) tablet 200 mg (200 mg Oral Given 2/9/22 1206)   lurasidone (LATUDA) tablet 40 mg (40 mg Oral Given 2/9/22 1657)   metFORMIN (GLUCOPHAGE-XR) 24 hr tablet 1,000 mg (1,000 mg Oral Given 2/9/22 1657)   psyllium (METAMUCIL/KONSYL) Packet 1 packet (1 packet Oral Not Given 2/9/22 1324)   ondansetron (ZOFRAN-ODT) ODT tab 4 mg (has no administration in time range)   pregabalin (LYRICA) capsule 100 mg (100 mg Oral Given 2/9/22 1822)   propranolol (INDERAL) tablet 10 mg (10 mg Oral Given 2/9/22 1328)   lidocaine (viscous) (XYLOCAINE) 2 % 15 mL, alum & mag hydroxide-simethicone (MAALOX) 15 mL GI Cocktail (30 mLs Oral Given 2/9/22 0457)   propranolol (INDERAL) tablet 10 mg (10 mg Oral Given 2/9/22 1822)            There were no significant events during my shift.    Patient was signed out to the oncoming provider, Dr. Cao      Impression:    ICD-10-CM    1. Swallowed foreign body, subsequent encounter  T18.9XXD Case Request: ESOPHAGOGASTRODUODENOSCOPY (EGD)     Case Request: ESOPHAGOGASTRODUODENOSCOPY (EGD)   2. Foreign body ingestion, initial encounter  T18.9XXA    3. Borderline personality disorder (H)  F60.3    4. Anxiety  F41.9         Plan:    1. Awaiting inpatient mental health admission/transfer.      RESULTS:   Results for orders placed or performed during the hospital encounter of 02/08/22 (from the past 24 hour(s))   Urine Drugs of Abuse Screen     Status: Normal    Collection Time: 02/08/22  7:07 PM    Narrative    The following orders were created for panel order Urine Drugs of Abuse Screen.  Procedure                               Abnormality         Status                     ---------                               -----------         ------                     Drug abuse screen 1 urin...[[id number]]  Normal              Final result                 Please view results for these tests on the individual orders.   Drug abuse screen 1 urine (ED)     Status: Normal    Collection Time: 02/08/22  7:07 PM   Result Value Ref Range    Amphetamines Urine Screen Negative Screen Negative    Barbiturates Urine Screen Negative Screen Negative    Benzodiazepines Urine Screen Negative Screen Negative    Cannabinoids Urine Screen Negative Screen Negative    Cocaine Urine Screen Negative Screen Negative    Opiates Urine Screen Negative Screen Negative   XR Abdomen 1 View     Status: None    Collection Time: 02/08/22  7:37 PM    Narrative    EXAM: XR ABDOMEN 1 VIEW  LOCATION: [facility]  DATE/TIME: 2/8/2022 7:28 PM    INDICATION: Swallowed foreign body.  COMPARISON: Chest x-ray 02/08/2022.      Impression    IMPRESSION: There are 2 metallic wires each measuring approximately 1.2 cm at the upper mid abdomen extending towards the left approximating the position of the stomach. This is partially included for imaging on the comparison chest x-ray. These are   compatible with swallowed foreign bodies. Cholecystectomy. No bowel obstruction. No visible free air.    XR Chest 1 View     Status: None    Collection Time: 02/08/22  7:38 PM    Narrative    EXAM: XR CHEST 1 VIEW  LOCATION: [facility]  DATE/TIME: 2/8/2022 7:28 PM    INDICATION: Swallowed foreign body.  COMPARISON: None.      Impression    IMPRESSION: No acute cardiopulmonary disease identified. Included imaging of the upper abdomen demonstrates a linear metallic foreign body that is partially included for imaging. It is at least 1.1 cm. This could reside within the gastric lumen.   Asymptomatic COVID-19 Virus (Coronavirus) by PCR Nasopharyngeal     Status: Normal    Collection Time: 02/08/22  9:20 PM    Specimen: Nasopharyngeal; Swab   Result Value Ref Range    SARS CoV2 PCR Negative Negative    Narrative    Testing was performed using the [facility]  SARS-CoV-2 & Influenza A/B Assay on the [employer]  Angi  System.  This test should be ordered for the detection of SARS-COV-2 in individuals who meet SARS-CoV-2 clinical and/or epidemiological criteria. Test performance is unknown in asymptomatic patients.  This test is for in vitro diagnostic use under the FDA EUA for laboratories certified under CLIA to perform moderate and/or high complexity testing. This test has not been FDA cleared or approved.  A negative test does not rule out the presence of PCR inhibitors in the specimen or target RNA in concentration below the limit of detection for the assay. The possibility of a false negative should be considered if the patient's recent exposure or clinical presentation suggests COVID-19.  [facility] are certified under the Clinical Laboratory Improvement Amendments of 1988 (CLIA-88) as qualified to perform moderate and/or high complexity laboratory testing.   UPPER GI ENDOSCOPY     Status: None    Collection Time: 02/08/22  9:26 PM   Result Value Ref Range    Upper GI Endoscopy       [address] [address] [phone]     Endoscopy Department  _______________________________________________________________________________  Patient Name: Nevin  Jenny     Procedure Date: 2/8/2022 9:26 PM  MRN: [id number]                       Account Number: [id number]  YOB: 1990             Admit Type: Outpatient  Age: 31                                Gender: Female  Note Status: Finalized                Attending MD: Lyndsey Croft MD  Pause for the Cause: Done             Total Sedation Time: See general   anesthesia documentation  _______________________________________________________________________________     Procedure:             Upper GI endoscopy  Indications:           Foreign body in the stomach  Providers:             Lyndsey Croft MD, Mario Aguilar MD  Patient Profile:       31 yof with MDD/CANDICE, recurrent foreign body ingestion,                          history of  esophageal perforation, morbid obesity,                          presenting with foreign body ingestion, will proceed                          to EGD  Referring MD:            Medicines:             General Anesthesia  Complications:         No immediate complications.  _______________________________________________________________________________  Procedure:             Pre-Anesthesia Assessment:                         - Prior to the procedure, a History and Physical was                          performed, and patient medications and allergies were                          reviewed. The patient is unable to give consent                          secondary to the patient being legally incompetent to                          consent. The risks and benefits of the procedure and                          the sedation options and risks were discussed with the                          patient's guardian. All questions were answered and                          informed consent was obtained . Patient identification                          and proposed procedure were verified by the physician                          and the nurse in the pre-procedure area.  Mental Status                          Examination: anxious. Airway Examination: normal                          oropharyngeal airway and neck mobility. Respiratory                          Examination: clear to auscultation. CV Examination:                          normal. Prophylactic Antibiotics: The patient does not                          require prophylactic antibiotics. Prior                          Anticoagulants: The patient has taken no anticoagulant                          or antiplatelet agents. ASA Grade Assessment: II - A                          patient with mild systemic disease. After reviewing                          the risks and benefits, the patient was deemed in                          satisfactory condition to undergo the procedure. The                          anesthesia plan was to use gen eral anesthesia.                          Immediately prior to administration of medications,                          the patient was re-assessed for adequacy to receive                          sedatives. The heart rate, respiratory rate, oxygen                          saturations, blood pressure, adequacy of pulmonary                          ventilation, and response to care were monitored                          throughout the procedure. The physical status of the                          patient was re-assessed after the procedure.                         After obtaining informed consent, the endoscope was                          passed under direct vision. Throughout the procedure,                          the patient's blood pressure, pulse, and oxygen                          saturations were monitored continuously. The Endoscope                          was introduced through the mouth, and advanced to the                          second part of duodenum. The upper GI endo scopy was                          accomplished without difficulty. The patient tolerated                           the procedure well.                                                                                   Findings:       One benign-appearing, intrinsic mild stenosis was found. The stenosis        was traversed easily. This was identified as the likely site of a past        perforation.       Localized moderate mucosal changes characterized by nodularity and        scarring were found in the middle third of the esophagus.       Two mask wires/twist ties were found in the gastric body. Removal was        accomplished with a snare and a rat tooth forceps.       Scattered mild mucosal changes characterized by erosion were found in        the gastric body, more than likely secondary to foreign bodies.       The duodenal bulb, first portion of the duodenum and second portion of        the duodenum were normal.                                                                                    Moderate Sedation:       N/A General Anesthesia  Impression:            - Benign-appearing esophageal stenosis.                         - Nodular, scarred mucosa in the esophagus.                         - Two mask wires/twist ties were found in the stomach.                          Removal was successful.                         - Scattered eroded mucosa in the gastric body.                         - Normal duodenal bulb, first portion of the duodenum                          and second portion of the duodenum.  Recommendation:        - Transport patient to ER on [facility] for further                          monitoring and cares                         - Recommend engagement with MH/inpatient psych                         - CLD, then ADAT                         - PRN viscous lidocaine/GI cocktail for possible                          oropharyngeal pain (patient has had two EGDs and has                          been intubated twice in last 24 hours)                          - [facility] will sign off, please reach out with                           questions or concerns                                                                                     Electronically Signed by Dr. Croft  ______________________  Lyndsey Corft MD  2/9/2022 8:56:45 AM  I was physically present for the entire viewing portion of the exam.  __________________________  Signature of teaching physician  Amairani/Jennifer Croft MD    ________________________  Mario Aguilar MD  Number of Addenda: 0    Note Initiated On: 2/8/2022 9:26 PM  Scope In:  Scope Out:     CBC with platelets differential     Status: Abnormal    Collection Time: 02/08/22  9:54 PM    Narrative    The following orders were created for panel order CBC with platelets differential.  Procedure                               Abnormality         Status                     ---------                               -----------         ------                     CBC with platelets and d...[[id number]]  Abnormal            Final result                 Please view results for these tests on the individual orders.   Basic metabolic panel     Status: Abnormal    Collection Time: 02/08/22  9:54 PM   Result Value Ref Range    Sodium 140 133 - 144 mmol/L    Potassium 4.8 3.4 - 5.3 mmol/L    Chloride 108 94 - 109 mmol/L    Carbon Dioxide (CO2) 24 20 - 32 mmol/L    Anion Gap 8 3 - 14 mmol/L    Urea Nitrogen 17 7 - 30 mg/dL    Creatinine 0.56 0.52 - 1.04 mg/dL    Calcium 9.5 8.5 - 10.1 mg/dL    Glucose 122 (H) 70 - 99 mg/dL    GFR Estimate >90 >60 mL/min/1.73m2   HCG qualitative Blood     Status: Normal    Collection Time: 02/08/22  9:54 PM   Result Value Ref Range    hCG Serum Qualitative Negative Negative   CBC with platelets and differential     Status: Abnormal    Collection Time: 02/08/22  9:54 PM   Result Value Ref Range    WBC Count 14.9 (H) 4.0 - 11.0 10e3/uL    RBC Count 4.40 3.80 - 5.20 10e6/uL    Hemoglobin 12.7 11.7 - 15.7 g/dL    Hematocrit 38.3 35.0 - 47.0 %    MCV 87 78 - 100 fL    MCH 28.9  26.5 - 33.0 pg    MCHC 33.2 31.5 - 36.5 g/dL    RDW 13.9 10.0 - 15.0 %    Platelet Count 300 150 - 450 10e3/uL    % Neutrophils 76 %    % Lymphocytes 16 %    % Monocytes 8 %    % Eosinophils 0 %    % Basophils 0 %    % Immature Granulocytes 0 %    NRBCs per 100 WBC 0 <1 /100    Absolute Neutrophils 11.3 (H) 1.6 - 8.3 10e3/uL    Absolute Lymphocytes 2.3 0.8 - 5.3 10e3/uL    Absolute Monocytes 1.2 0.0 - 1.3 10e3/uL    Absolute Eosinophils 0.0 0.0 - 0.7 10e3/uL    Absolute Basophils 0.0 0.0 - 0.2 10e3/uL    Absolute Immature Granulocytes 0.1 <=0.4 10e3/uL    Absolute NRBCs 0.0 10e3/uL   Extra Tube     Status: None    Collection Time: 02/08/22 10:06 PM    Narrative    The following orders were created for panel order Extra Tube.  Procedure                               Abnormality         Status                     ---------                               -----------         ------                     Extra Blue Top Tube[[id number]]                              Final result                 Please view results for these tests on the individual orders.   Extra Blue Top Tube     Status: None    Collection Time: 02/08/22 10:06 PM   Result Value Ref Range    Hold Specimen [facility]              MD Percy Moy Ashley A, MD  02/09/22 4169

## 2022-02-10 NOTE — ED NOTES
[facility] ED Mental Health Handoff Note:       Brief HPI:  This is a 31 year old female signed out to me by Dr. Basurto.  See initial ED Provider note for full details of the presentation.  The patient is a 31-year-old female with a history of anxiety and recurrent foreign body ingestion who presented to the emergency department after ingesting 2 wires from a surgical mask.  Wires were removed by GI service. She is awaiting inpatient mental health bed at this time.     Home meds reviewed and ordered/administered: Yes    Medically stable for inpatient mental health admission: Yes.    Evaluated by mental health: Yes. The recommendation is for inpatient mental health treatment. Bed search in process    Safety concerns: At the time I received sign out, patient remains with 1:1 sitter to prevent further foreign body ingestion .    Hold Status:  Active Orders   N/A            Exam:   Patient Vitals for the past 24 hrs:   BP Temp Temp src Pulse Resp SpO2   02/10/22 0850 (!) 146/60 98.2  F (36.8  C) Oral 82 16 96 %   02/10/22 0824 (!) 133/97 -- -- 84 18 98 %   02/10/22 0820 (!) 133/97 97.9  F (36.6  C) Oral 84 18 98 %   02/10/22 0135 115/74 97.8  F (36.6  C) Oral 87 18 98 %   02/09/22 1710 (!) 143/77 (!) 96.2  F (35.7  C) Oral 90 18 97 %             ED Course:    Medications   hydrOXYzine (ATARAX) tablet 25 mg (25 mg Oral Not Given 2/9/22 0956)   albuterol (PROVENTIL HFA/VENTOLIN HFA) inhaler (has no administration in time range)   busPIRone (BUSPAR) tablet 20 mg (20 mg Oral Given 2/10/22 1446)   cetirizine (zyrTEC) tablet 10 mg (10 mg Oral Not Given 2/10/22 0935)   Vitamin D3 (CHOLECALCIFEROL) tablet 2,000 Units (2,000 Units Oral Given 2/10/22 0911)   desvenlafaxine (PRISTIQ) 24 hr tablet 100 mg (100 mg Oral Given 2/10/22 1020)   ferrous sulfate (FEROSUL) tablet 325 mg (325 mg Oral Given 2/10/22 0938)   hydroxychloroquine (PLAQUENIL) tablet 200 mg (200 mg Oral Given 2/10/22 0939)   lurasidone (LATUDA) tablet 40 mg  (40 mg Oral Given 2/9/22 1657)   metFORMIN (GLUCOPHAGE-XR) 24 hr tablet 1,000 mg (1,000 mg Oral Given 2/9/22 1657)   psyllium (METAMUCIL/KONSYL) Packet 1 packet (1 packet Oral Not Given 2/10/22 0940)   ondansetron (ZOFRAN-ODT) ODT tab 4 mg (has no administration in time range)   pregabalin (LYRICA) capsule 100 mg (100 mg Oral Given 2/10/22 1410)   propranolol (INDERAL) tablet 10 mg (10 mg Oral Given 2/9/22 1328)   lidocaine (viscous) (XYLOCAINE) 2 % 15 mL, alum & mag hydroxide-simethicone (MAALOX) 15 mL GI Cocktail (30 mLs Oral Given 2/9/22 0457)   propranolol (INDERAL) tablet 10 mg (10 mg Oral Given 2/9/22 1822)   benzocaine-menthol (CEPACOL) 15-3.6 MG lozenge 1 lozenge (1 lozenge Buccal Given 2/10/22 1035)            There were no significant events during my shift.        Impression:    ICD-10-CM    1. Swallowed foreign body, subsequent encounter  T18.9XXD Case Request: ESOPHAGOGASTRODUODENOSCOPY (EGD)     Case Request: ESOPHAGOGASTRODUODENOSCOPY (EGD)   2. Foreign body ingestion, initial encounter  T18.9XXA    3. Borderline personality disorder (H)  F60.3    4. Anxiety  F41.9        Plan:    1. Awaiting inpatient mental health admission/transfer.      RESULTS:   No results found for this visit on 02/08/22 (from the past 24 hour(s)).          MD Rufino Vergara, Lola ARNOLD MD  02/10/22 8848

## 2022-02-10 NOTE — TELEPHONE ENCOUNTER
R:  No appropriate beds currently available within [facility] system. Per chart, metro only. Bed search update @ 01:43:    [facility]: @ cap per website  [facility]:@ cap per website  [facility]: @ cap per website  [facility]: @ cap per website  [facility]: @ cap per website  [facility]: @ cap per website    Pt remains on work list until appropriate placement is available

## 2022-02-10 NOTE — ED NOTES
Patient requested to eat ice cream with a spoon. I informed the patient that she is not allowed to use utensils and verified with the patient the e-mail that was sent by management indicating the safety interventions in place. The patient became upset and wanted to speak with the charge nurse. The charge nurse attempted to reinforce said interventions with no success. The MD was notified and said it was okay for the patient to use a spoon. Patient was given ice cream with a spoon with the sitter standing bedside.

## 2022-02-10 NOTE — TELEPHONE ENCOUNTER
R:  Patient cleared and ready for behavioral bed placement: Yes     Dr Arias paged @ 10:56am to present for 10/Nielsolo  Dr SAM called back @ 11:01am and disposition given.   Dr SAM will f/u with unit staff and call intake back.   Informed Dr Rutherford now covering 10 and Eduardo covering 30.  Paged provider Krish @ 11:51am for 10/Krish Rutherford called back @ 12:12pm and unit [place] already high with 2 SIO's.  Unable to take at this time.      Paged Dr Ledesma @ 12:40pm to present for 20 w/SIO  Dr Ledesma called back @ 12:43pm and accepted pt for unit 20/[facility].  Pt placed in unit que and charge called with disposition @ 12:44pm  ED Updated with placement @ 12:50pm  -  Report 3:30pm per Charge  Unit 20 Charge aware of SIO nee and states will call staffing

## 2022-02-11 LAB
CHOLEST SERPL-MCNC: 132 MG/DL
DEPRECATED CALCIDIOL+CALCIFEROL SERPL-MC: 44 UG/L (ref 20–75)
FOLATE SERPL-MCNC: 17.9 NG/ML
HDLC SERPL-MCNC: 41 MG/DL
HOLD SPECIMEN: NORMAL
LDLC SERPL CALC-MCNC: 38 MG/DL
NONHDLC SERPL-MCNC: 91 MG/DL
T4 FREE SERPL-MCNC: 0.87 NG/DL (ref 0.76–1.46)
TRIGL SERPL-MCNC: 266 MG/DL
TSH SERPL DL<=0.005 MIU/L-ACNC: 4.94 MU/L (ref 0.4–4)
VIT B12 SERPL-MCNC: 203 PG/ML (ref 193–986)

## 2022-02-11 PROCEDURE — 82607 VITAMIN B-12: CPT | Performed by: STUDENT IN AN ORGANIZED HEALTH CARE EDUCATION/TRAINING PROGRAM

## 2022-02-11 PROCEDURE — 124N000002 HC R&B MH UMMC

## 2022-02-11 PROCEDURE — 36415 COLL VENOUS BLD VENIPUNCTURE: CPT | Performed by: STUDENT IN AN ORGANIZED HEALTH CARE EDUCATION/TRAINING PROGRAM

## 2022-02-11 PROCEDURE — 82746 ASSAY OF FOLIC ACID SERUM: CPT | Performed by: STUDENT IN AN ORGANIZED HEALTH CARE EDUCATION/TRAINING PROGRAM

## 2022-02-11 PROCEDURE — 80061 LIPID PANEL: CPT | Performed by: STUDENT IN AN ORGANIZED HEALTH CARE EDUCATION/TRAINING PROGRAM

## 2022-02-11 PROCEDURE — G0177 OPPS/PHP; TRAIN & EDUC SERV: HCPCS

## 2022-02-11 PROCEDURE — 250N000013 HC RX MED GY IP 250 OP 250 PS 637: Performed by: STUDENT IN AN ORGANIZED HEALTH CARE EDUCATION/TRAINING PROGRAM

## 2022-02-11 PROCEDURE — 84443 ASSAY THYROID STIM HORMONE: CPT | Performed by: STUDENT IN AN ORGANIZED HEALTH CARE EDUCATION/TRAINING PROGRAM

## 2022-02-11 PROCEDURE — 250N000011 HC RX IP 250 OP 636: Performed by: STUDENT IN AN ORGANIZED HEALTH CARE EDUCATION/TRAINING PROGRAM

## 2022-02-11 PROCEDURE — 84439 ASSAY OF FREE THYROXINE: CPT | Performed by: STUDENT IN AN ORGANIZED HEALTH CARE EDUCATION/TRAINING PROGRAM

## 2022-02-11 PROCEDURE — 99223 1ST HOSP IP/OBS HIGH 75: CPT | Mod: AI | Performed by: PSYCHIATRY & NEUROLOGY

## 2022-02-11 RX ORDER — PROPRANOLOL HYDROCHLORIDE 80 MG/1
80 CAPSULE, EXTENDED RELEASE ORAL DAILY
Status: DISCONTINUED | OUTPATIENT
Start: 2022-02-11 | End: 2022-02-17 | Stop reason: HOSPADM

## 2022-02-11 RX ORDER — IBUPROFEN 600 MG/1
600 TABLET, FILM COATED ORAL EVERY 6 HOURS PRN
Status: DISCONTINUED | OUTPATIENT
Start: 2022-02-11 | End: 2022-02-15

## 2022-02-11 RX ADMIN — LURASIDONE HYDROCHLORIDE 40 MG: 40 TABLET, FILM COATED ORAL at 20:37

## 2022-02-11 RX ADMIN — ONDANSETRON 4 MG: 4 TABLET, ORALLY DISINTEGRATING ORAL at 12:34

## 2022-02-11 RX ADMIN — PREGABALIN 100 MG: 100 CAPSULE ORAL at 08:51

## 2022-02-11 RX ADMIN — PREGABALIN 100 MG: 100 CAPSULE ORAL at 18:45

## 2022-02-11 RX ADMIN — HYDROXYCHLOROQUINE SULFATE 200 MG: 200 TABLET, FILM COATED ORAL at 20:37

## 2022-02-11 RX ADMIN — DESVENLAFAXINE SUCCINATE 100 MG: 100 TABLET, EXTENDED RELEASE ORAL at 08:51

## 2022-02-11 RX ADMIN — METFORMIN ER 500 MG 1000 MG: 500 TABLET ORAL at 17:01

## 2022-02-11 RX ADMIN — BUSPIRONE HYDROCHLORIDE 20 MG: 10 TABLET ORAL at 20:37

## 2022-02-11 RX ADMIN — CETIRIZINE HYDROCHLORIDE 10 MG: 10 TABLET, FILM COATED ORAL at 20:38

## 2022-02-11 RX ADMIN — BUSPIRONE HYDROCHLORIDE 20 MG: 10 TABLET ORAL at 14:47

## 2022-02-11 RX ADMIN — IBUPROFEN 600 MG: 600 TABLET ORAL at 20:37

## 2022-02-11 RX ADMIN — HYDROXYCHLOROQUINE SULFATE 200 MG: 200 TABLET, FILM COATED ORAL at 08:51

## 2022-02-11 RX ADMIN — PSYLLIUM HUSK 1 PACKET: 3.4 POWDER ORAL at 08:51

## 2022-02-11 RX ADMIN — Medication 2000 UNITS: at 08:51

## 2022-02-11 RX ADMIN — PROPRANOLOL HYDROCHLORIDE 80 MG: 80 CAPSULE, EXTENDED RELEASE ORAL at 17:02

## 2022-02-11 RX ADMIN — IBUPROFEN 600 MG: 600 TABLET ORAL at 13:28

## 2022-02-11 RX ADMIN — PREGABALIN 100 MG: 100 CAPSULE ORAL at 13:28

## 2022-02-11 ASSESSMENT — ACTIVITIES OF DAILY LIVING (ADL)
HYGIENE/GROOMING: INDEPENDENT
HYGIENE/GROOMING: SHOWER;INDEPENDENT
ORAL_HYGIENE: INDEPENDENT
ORAL_HYGIENE: INDEPENDENT
LAUNDRY: WITH SUPERVISION
LAUNDRY: WITH SUPERVISION
DRESS: INDEPENDENT
DRESS: INDEPENDENT

## 2022-02-11 NOTE — PLAN OF CARE
Problem: Behavioral Health Plan of Care  Goal: Patient-Specific Goal (Individualization)  Flowsheets (Taken 2/11/2022 1104)  Patient Vulnerabilities:   poor physical health   adverse childhood experience(s)   lacks insight into illness   poor impulse control  Patient Personal Strengths:   ability to maintain sobriety   socioeconomic stability   stable living environment   community support  Note:   Personal Plan of Care    Patient goals:  Feel stable/contract for safety   Absence of SIB urges  Med adjustments as needed    Plan for admission:  1. stabilization of mood disorder symptoms.   2. Absence of SI/Safe with self  3. Medication management per Mds  4. Coordination of Care with outpatient providers/family  5. Psychiatric follow up care in place

## 2022-02-11 NOTE — PLAN OF CARE
Problem: Sleep Disturbance (Anxiety Signs/Symptoms)  Goal: Improved Sleep (Anxiety Signs/Symptoms)  Outcome: Improving     Patient appeared to have slept 6.5 hours, safety checks and precautions in place. No PRN given or requested this shift. Will monitor and offer support as needed

## 2022-02-11 NOTE — PROGRESS NOTES
A             In locker:  Pt. has a jacket, black purse, a phone  in purse, cell phone, bra, black sunglasses in purse, black shirt, 1 pair of jeans, white crocs, blue long sleeve shirt, a dressy hoodie shirt, leggings, toiletress in bag, several pull ups/depends.    Admission:  I am responsible for any personal items that are not sent to the safe or pharmacy.  [facility] is not responsible for loss, theft or damage of any property in my possession.    Signature:  _________________________________ Date: _______  Time: _____                                              Staff Signature:  ____________________________ Date: ________  Time: _____      2nd Staff person, if patient is unable/unwilling to sign:    Signature: ________________________________ Date: ________  Time: _____     Discharge:  [facility] has returned all of my personal belongings:    Signature: _________________________________ Date: ________  Time: _____                                          Staff Signature:  ____________________________ Date: ________  Time: _____

## 2022-02-11 NOTE — H&P
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 left side. Also had cortisone injection the same day as she started the Augmentin.; HUT Comment: PN: facial swelling, left side. Also had cortisone injection the same day as she started the Augmentin.Noted in downtime recovery of chart.; HUT Reaction: Rash; HUT Reaction: Unknown; HUT Reaction Type: Allergy; HUT Severity: Med; HUT Noted: [id number]     Oseltamivir Hives     med  stopped, PN: med stopped  med stopped, PN: med stopped; HUT Comment: med stopped, PN: med stopped; HUT Reaction: Hives; HUT Reaction Type: Allergy; HUT Severity: Med; HUT Noted: [id number]     Penicillins Anaphylaxis     HUT Reaction: Anaphylaxis; HUT Reaction Type: Allergy; HUT Severity: High; HUT Noted: [id number]     Vancomycin Itching, Swelling and Rash     Other reaction(s): Redness  Flushed face, very itchy; HUT Comment: Flushed face, very itchy; HUT Reaction: Angioedema; HUT Reaction: Redness; HUT Severity: Med; HUT Noted: [id number]    facial     Hydrocodone Nausea and Vomiting and GI Disturbance     vomiting for days, PN: vomiting for days; HUT Comment: vomiting for days; HUT Reaction: Gastrointestinal; HUT Reaction: Nausea And Vomiting; HUT Reaction Type: Intolerance; HUT Severity: Med; HUT Noted: [id number]  vomiting for days       Acetaminophen Hives     Blood-Group Specific Substance Other (See Comments)     Patient has an anti-Cw and non-specific antibodies. Blood product orders may be delayed. Draw one red top and two purple top tubes for all type/screen/crossmatch orders.  Patient has anti-Cw, anti-K ([place]), Warm auto and nonspecific antibodies. Blood products may be delayed. Draw patient 24 hours prior to transfusion. Draw one red top and two purple top tubes for all type and screen orders.     Clavulanic Acid Angioedema     Naltrexone Other (See Comments)     Reaction(s): Vivid dreams.  Reaction(s): Vivid dreams.       Oxycodone Swelling     Adhesive Tape Rash     Silicone type  Silicone type     Cephalosporins Rash     Lamotrigine Rash     Possibly associated with Lamictal.   HUT Comment: Possibly associated with Lamictal. ; HUT Reaction: Rash; HUT Reaction Type: Allergy; HUT Severity: Low; HUT Noted: [id number]     Latex Rash     HUT Reaction: Rash; HUT Reaction Type: Allergy; HUT Severity: Low; HUT Noted: [id number]          Medications:     No current outpatient medications on file.             Family  "History:       Family History   Problem Relation Age of Onset     Diabetes Type 2  Maternal Grandmother      Diabetes Type 2  Paternal Grandmother      Breast Cancer Paternal Grandmother      Cerebrovascular Disease Father 53     Myocardial Infarction No family hx of      Coronary Artery Disease Early Onset No family hx of      Ovarian Cancer No family hx of      Colon Cancer No family hx of      Depression Mother      Anxiety Disorder Mother             Labs:   No results found for this or any previous visit (from the past 24 hour(s)).       Psychiatric Examination:   BP (!) 153/90   Pulse 87   Temp 97.8  F (36.6  C) (Temporal)   Resp 16   Wt 132.1 kg (291 lb 3.2 oz)   LMP 12/02/2021 (Approximate)   SpO2 98%   BMI 53.26 kg/m      Appearance:  Obese female, wearing hospital gown, purple/teal colored hair, no apparent acute distress, cooperative and friendly, tearful at times.   Attitude:  cooperative, friendly and anxious   Eye Contact:  good  Mood:  \"Not super depressed, more like self deprecating and feeling like I'm a failure\"   Affect:  mood congruent, intensity is exaggerated and full range  Speech:  clear, coherent and normal prosody, talkative   Psychomotor Behavior:  no evidence of tardive dyskinesia, dystonia, or tics  Thought Process:  logical and goal oriented  Associations:  no loose associations  Thought Content:  no evidence of suicidal ideation or homicidal ideation, no evidence of psychotic thought, thoughts of self-harm, which are increased and no visual hallucinations present  Insight:  fair  Judgment:  limited  Oriented to:  time, person, and place  Attention Span and Concentration:  intact  Recent and Remote Memory:  intact  Language:  english with appropriate syntax and vocabulary  Fund of Knowledge: appropriate  Gait and Station: Normal station, gait not assessed, uses walker.         Physical Exam:     See ED assessment note by ED physician on 02/07        Assessment   Nevin ZULUAGA" Jenny is a 31 year old  female with a past psychiatric history of MDD, PTSD  and Borderline Personality Disorder, factitious disorder,  has a long history of swallowing inedible objects as intentional self-harm.   Pt admitted from the  ER on 02/10/2022 due to concern for out of control behaviors and SIB, mood lability, sleep issues, poor frustration tolerance and impulsivein the context of psychosocial stressors including hx of trauma, chronic mental health issues, family dynamics and medical issues being overly-sensitive to criticism, mood liability, maladaptive coping mechanism. Patient  reports medication adherence.   She is currently followed by MH provider  Kiersten Johnson, [facility] and PCP  Latonya Knight M.D at [facility]. Current psychosocial stressors include trauma, chronic mental health issues, family dynamics and medical issues which she has been coping with by SIB, acting out to self and acting out to others.  Patient's support system includes family, county and outpatient team.  Substance use does not appear to be playing a contributing role in the patient's presentation.  There is genetic loading for mood and anxiety. Medical history does appear to be significant for obesity, chronic pain, autoimmune disorder.  Psychologically hx of trauma leading to chronic PTSD with maladaptive coping mechanism and SIB are a pivotal contributing factor to presentation. Socially Chronic MH illness, difficulty with interpersonal relationships and maintaining a job are factors affecting patient's ability to thrive in society . The MSE is notable for low mood, anxious/exaggerated affect, pt being hypertalkative, tearful and feeling ashamed; no evidence of suicidal ideation or homicidal ideation, no evidence of psychotic thought, thoughts of self-harm, which are increased and no visual hallucinations present, no elicited delusions and impaired judgement.   Her reported symptoms of  out of control  behaviors and SIB, mood lability, sleep issues, poor frustration tolerance and impulsiveness,  Difficulty w interpersonal relationships are consistent with her historic diagnosis of Complex PTSD with underlying borderline personality disorder.  Additionally, her underlying dx of  borderline interfere with her ability to adhere with the treatment plan.  Optimization of medications to target these symptom clusters in addition to evaluation of adquate outpatient supports will be targets for treatment during this admission.     Given that she currently has out of control behaviors and SIB, patient warrants inpatient psychiatric hospitalization to maintain her safety. Disposition pending clinical stabilization, medication optimization and development of an appropriate discharge plan.    Risk for harm is elevated.  Risk factors: maladaptive coping, trauma, family dynamics, impulsive and past behaviors  Protective factors: family, [facility] services, Group home.     Principal psychiatric diagnosis:   - Complex PTSD severe, recurrent    Secondary psychiatric diagnoses:   -MDD, recurrent, moderate   -CANDICE   -Factitious Disorder             Plan     Admit to [facility] with Attending Physician Dr. Marian Ledesma M.D.    Medications:   Outpatient medications held:     None    Outpatient medications continued:   acetaminophen (TYLENOL) 500 MG tablet Take 500-1,000 mg by mouth every 8 hours as needed for mild pain  Past Week at Unknown time Yes Unknown, Entered By History   albuterol (PROAIR HFA/PROVENTIL HFA/VENTOLIN HFA) 108 (90 Base) MCG/ACT inhaler Inhale 2 puffs into the lungs every 6 hours as needed for shortness of breath / dyspnea or wheezing Past Week at Unknown time Yes Nish Saucedo MD   albuterol (PROVENTIL) (2.5 MG/3ML) 0.083% neb solution Take 1 vial (2.5 mg) by nebulization every 4 hours as needed for shortness of breath / dyspnea 1 vial 3 mL or 2.5 mg albuterol by nebulization every 2-4 hours as needed  for shortness of breath or wheezing.  Prescribe 1 box. Past Week at Unknown time Yes Nish Saucedo MD   busPIRone (BUSPAR) 10 MG tablet Take 20 mg by mouth 3 times daily 2/7/2022 at PM Yes Unknown, Entered By History   cetirizine (ZYRTEC) 10 MG tablet Take 10 mg by mouth daily 2/7/2022 at AM Yes Unknown, Entered By History   Cholecalciferol (VITAMIN D) 50 MCG (2000 UT) CAPS Take 2,000 Units by mouth daily  2/7/2022 at AM Yes Unknown, Entered By History   desvenlafaxine (PRISTIQ) 100 MG 24 hr tablet Take 1 tablet (100 mg) by mouth every morning 2/7/2022 at AM Yes Reported, Patient   ferrous sulfate (FEROSUL) 325 (65 Fe) MG tablet Take 325 mg by mouth daily (with breakfast) 2/7/2022 at AM Yes Unknown, Entered By History   hydroxychloroquine (PLAQUENIL) 200 MG tablet Take 200 mg by mouth 2 times daily 2/7/2022 at AM Yes Reported, Patient   lurasidone (LATUDA) 40 MG TABS tablet Take 40 mg by mouth daily (with dinner)  2/7/2022 at Evening Yes Reported, Patient   metFORMIN (GLUCOPHAGE-XR) 500 MG 24 hr tablet Take 1,000 mg by mouth daily (with dinner)  2/7/2022 at 17:00 Yes Unknown, Entered By History   methylcellulose (CITRUCEL) powder Take by mouth daily 2/7/2022 Yes Reported, Patient   ondansetron (ZOFRAN-ODT) 4 MG ODT tab Take 4 mg by mouth every 8 hours as needed for nausea More than a month at Unknown time Yes Unknown, Entered By History   pregabalin (LYRICA) 100 MG capsule Take 100 mg by mouth 3 times daily  2/7/2022 at 19:00 Yes Reported, Patient   propranolol (INDERAL) 10 MG tablet Take 10 mg by mouth . Take one tablet (10 mg) daily as needed for anxiety, may repeat once if needed  at Has not started yet Yes Unknown, Entered By History   valACYclovir (VALTREX) 1000 mg tablet Take 2 tablets by mouth two times daily for one day. Use as needed at onset of cold sore.  More than a month at Unknown time Yes Unknown, Entered By History   Respiratory Therapy Supplies (NEBULIZER) BRENDAN Nebulizer device.  Albuterol  nebulization every 2 hours as needed for shortness of breath or wheezing.           Changes made to PTA medication list (reason):    Added (Prescribed earlier this month:   ? Propranolol 10 mg tablet - take one tablet daily as needed for anxiety, may repeat dose once.  This is a new prescription prescribed February 2022 that the patient has not filled yet  ? Ferrous sulfate 325 mg tablet - take once daily in the morning with breakfast          Hospital PRNs as ordered:  albuterol, alum & mag hydroxide-simethicone, hydrOXYzine, melatonin, nicotine, OLANZapine **OR** OLANZapine, ondansetron, polyethylene glycol, propranolol    Medications: risks/benefits discussed with county worker and guardian, per chart review. Please see nursing notes/DEC assessment for further details.     Patient will be treated in therapeutic milieu with appropriate individual and group therapies.    Laboratory/Imaging:  - Labs: Lipid panel, TSH/t3/t4, Vit B12, Folate ordered. Pending morning draw on 2/11    Legal Status:   Orders Placed This Encounter      Voluntary/has legal guardian       Safety Assessment:    Behavioral Orders   Procedures     Code 1 - Restrict to Unit     Routine Programming     As clinically indicated     Self Injury Precaution     Status 15     Every 15 minutes.     Status Individual Observation     Patient SIO status reviewed with team/RN.  Please also refer to RN/team documentation for add'l detail.    -SIO staff to monitor following which have contributed to patient being on SIO:  Intentional ingestion of non-food objects  -Possible interventions SIO staff could use to support patient's treatment progress:  Ensure environment does not contain swallow-able objects,  intervene if she reaches for small objects  -When following observed, team will review discontinuation of SIO:  Consistent safe behavior without attempts to obtain small objects.     Order Specific Question:   CONTINUOUS 24 hours / day     Answer:   Other      Order Specific Question:   Specify distance     Answer:   1 foot when in the milieu or any space that has not been cleared of small objects. 5 feet in room, provided it and she have had swallow-able objects cleared.     Order Specific Question:   Indications for SIO     Answer:   Self-injury risk     Suicide precautions     Patients on Suicide Precautions should have a Combination Diet ordered that includes a Diet selection(s) AND a Behavioral Tray selection for Safe Tray - with utensils, or Safe Tray - NO utensils     Suicide precautions     Patients on Suicide Precautions should have a Combination Diet ordered that includes a Diet selection(s) AND a Behavioral Tray selection for Safe Tray - with utensils, or Safe Tray - NO utensils        Pt has not required locked seclusion or restraints in the past 24 hours to maintain safety, please refer to RN documentation for further details. Current SIO     Consults:  - none    Medical diagnoses to be addressed this admission:   None       Dispo: unknown pending medication management and clinical stabilization    Patient to be staffed with the attending physician, Dr. Marian Ledesma in the morning.     -------------------------------------------------------  Dudley Bryant MD  PGY-1 Psychiatry Resident    Attestation:  For attending attestation statement, see progress note dated February 11 2022. Marian Ledesma MD
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
## 2022-02-11 NOTE — PLAN OF CARE
Problem: Suicidal Behavior  Goal: Suicidal Behavior is Absent or Managed  Outcome: Improving    Pt was admitted to station 20 d/t foreign body ingestion. Pt was dropped off by  after this incident. Pt reportedly swallowed 2 metal wires from a facemask in an attempt to self-harm. Objects have been removed and pt is medically cleared. Pt also has hx of inserting foreign objects rectally. Pt has hx of BPD, ADHD, depression, PTSD, SIB, recurrent foreign body ingestion, sleep apnea, asthma, and obesity.    Pt uses a walker d/t chronic pelvic pain and neuropathy. Pt is on a SIO 1:1 5 feet while in room, and 1 foot while in the milieu. Pt has orders for finger foods, safe tray, and no utensils. Since arriving on the unit, pt has been calm, cooperative, and pleasant. Pt has been accepting of these orders that have been put in place for her safety and was asked to follow up with the primary team tomorrow. Pt remained in her room after arriving on the unit and went to bed early after taking her evening medications.    Pt is voluntary and has a legal guardian through [facility]. Pt is covid negative. Adm profile needs to be finished as pt went to bed early. Most of it is done.

## 2022-02-11 NOTE — PROGRESS NOTES
02/11/22 1430   General Information   Date Initially Attended OT 02/11/22     Date of Service: February 11, 2022    Description: Nevin attended 1 occupational therapy group today. Overall, presented with a restricted affect. Receptive upon approach.   1:15 - 2:00. 4 total participants. Leisure exploration and participation group offered for increased intrinsic motivation to engage in social, non-obligatory occupations via an assortment of group games. Structured group was used to promote positive milieu interaction and collaboration. Held in [facility]. Pt Response: SIO present. Intermittently participated. Social with peers. Requested to color and inquired about gel pens. Remained safe with regular markers today while she quietly colored.     Assessment: Benefit from engagement in OT groups is supported as they explore healthy recovery strategies, positive coping skills for symptom management, relapse prevention, and resumption of personal roles, routines and daily occupations.    Plan: OT staff will meet with pt to review the role of occupational therapy and explain the value of having them involved in their treatment plan including options to meet current needs/self-identified goals. As group attendance is established,  continued clinical observations will be made and Pt will be given self-assessment to inform OT initial assessment. Continue to encourage group attendance. Provide graded occupation-based activities for increased success towards OT goals and ongoing assessment.     Dilcia Dick, OT on 2/11/2022 at 2:31 PM

## 2022-02-11 NOTE — PLAN OF CARE
BEHAVIORAL TEAM DISCUSSION    Participants: Dr. Ledesma, Psychiatry Resident, Med Students, Lola MORALES, Samira Dan [facility]  Progress: Initial assessment   Anticipated length of stay: No anticipated discharge date at this time   Continued Stay Criteria/Rationale: SIB by ingestion. Needs clinical stabilization and medication management   Medical/Physical: See MD's daily note  Precautions:   Behavioral Orders   Procedures    Code 1 - Restrict to Unit    Routine Programming     As clinically indicated    Self Injury Precaution    Status 15     Every 15 minutes.    Status Individual Observation     Patient SIO status reviewed with team/RN.  Please also refer to RN/team documentation for add'l detail.    -SIO staff to monitor following which have contributed to patient being on SIO:  Intentional ingestion of non-food objects  -Possible interventions SIO staff could use to support patient's treatment progress:  Ensure environment does not contain swallow-able objects,  intervene if she reaches for small objects  -When following observed, team will review discontinuation of SIO:  Consistent safe behavior without attempts to obtain small objects.     Order Specific Question:   CONTINUOUS 24 hours / day     Answer:   Other     Order Specific Question:   Specify distance     Answer:   1 foot when in the milieu or any space that has not been cleared of small objects. 5 feet in room, provided it and she have had swallow-able objects cleared.     Order Specific Question:   Indications for SIO     Answer:   Self-injury risk    Suicide precautions     Patients on Suicide Precautions should have a Combination Diet ordered that includes a Diet selection(s) AND a Behavioral Tray selection for Safe Tray - with utensils, or Safe Tray - NO utensils    Suicide precautions     Patients on Suicide Precautions should have a Combination Diet ordered that includes a Diet selection(s) AND a Behavioral Tray selection for Safe Tray - with  utensils, or Safe Tray - NO utensils       Plan: Patient will have ongoing psychiatric assessment. Medications will be reviewed and adjusted per MD as indicated. Outpatient providers will be contacted for care coordination. CTC will continue to assess needs and  ensure appropriate follow up care is in place.   Rationale for change in precautions or plan: None

## 2022-02-11 NOTE — PROGRESS NOTES
"    ----------------------------------------------------------------------------------------------------------  [facility], [place]   Psychiatric Progress Note  Hospital Day #1    Identifier: Nevin Alvarado is a 31 year old female with previous psychiatric diagnoses of MDD, PTSD, borderline personality disorder, CANDICE, factitious disorder who presents with recent self-injurious behavior (ingestion). Admission was voluntary. Given her extensive, previous history of self-injurious ingestions, assessment is that the current presentation is consistent with self-injurious ingestion related to anxiety.     Interim History:   The patient's care was discussed with the treatment team and chart notes were reviewed.    Vitals: Hypertensive (153/90), otherwise VSS  Sleep: 6.5 hours (02/11/22 0611)  Scheduled medications: Took all scheduled psychiatric medications as prescribed  PRN medications: No psychiatric prns given    Staff Report:   Per chart review, SIO in place. Has been awhile since last shower. Currently in pain due to period cramps. Pt asked for own shampoo/conditioner and hair brush.     Subjective:     Patient Interview:  Nevin Alvarado was interviewed in bedroom.    Patient was sleeping when team entered the room. Feeling \"okay\" due to not showering for awhile and is in significant pain due to her period cramps. Reports not having periods regularly but when she does have them, there is heavy flow.     Denies thoughts of SI/SIB, impulsive thoughts to ingest. Discussed specific triggers for ingestion which include \"office supplies like wires, paper clips, bread ties, pen springs.\" Otherwise no issue with utensils, markers.     Prior to hospitalization patient was not sleeping well due to anxiety. Anxiety has been building up for a month which she feels led to her admission and was exacerbated in the emergency room setting when she recently had pneumonia. Propanolol did help " "in ED. She feels like when she gets anxious, she has a strong desire to get admitted since it makes her feel better but has insight that it is probably counterproductive to her overall goal of being more independent and not ingesting as much. She describes her anxiety as more \"fluctuating\" than a steady increase. It is unpredictable and overwhelming when the intense episodes occur, describes feeling of doom. Has had therapy for methods to try and help control the anxiety but don't seem to work for her when \"it's really bad.\" Notes that she had no ingestion events for 4-5m before the past few weeks.     Pt is okay with us calling guardian and her psychiatrist to change medications based on their plans. Otherwise would prefer not to make too many changes to her medications. She last saw her psychiatrist 4 months ago. Reports having disturbing dreams for naltrexone side effects, unsure about topiramate. Regarding her multiple past Benadryl overdoses (last 03/2021), states it was seeking a recreational \"high.\"     Pt reports she has a tremor that is significantly worse with drinking from a cup. No resting tremor. Okay with long-acting propanolol to try and treat tremor and anxiety instead of PRN.    Phone call with Guardian:  Spoke with isrrael on the phone. She expresses concern that her swallowing behaviors are motivated by \"enjoyment, she likes medications before scope, or anxiety, but sometimes she'll say it's calming.\" Notes that she's had no ingestion incidents since August, and wonders if recent staff changes in home - including a  and  who she was close with - may have contributed to her recent string of ingestions. She's had 4 ED visits following swallowing events recently. She also notes that the same thing happened last year around this time and wonders if it has to do with this time of year. Guardian also notes that pt had been getting ready to leave the  and move to a Family " "Home, which she was excited about.     Guardian would like to be a part of discharge, and has concerns that patient \"will do whatever she can to get whatever she wants.\"    Phone call with primary psychiatrist:  Report longstanding history of cycles of doing well for a while, but then spiraling and having difficulty pulling self out of spiral. She's not sure if this is anxiety driven, or an element of seeking medications. She feels that patient would get most benefit from repeating DBT. Patient has declined recently, saying that group and talking about her SIB were triggering. Dr. De La Rosa's goal for this hospitalization would be to find an effective prn for anxiety or something to help break cycle. She's been considering a low dose benzodiazepine or changing desvenlafaxine. Clonidine was ineffective and discontinued. Also notes that patient has been lowering Latuda out of concerning for weight gain and is considering brexpiprazole.     Regarding Topamax, notes this was last used in Feb 2019 and can't recall exactly why it was discontinued, possibly fatigue. She agrees with trial of propanolol SA for both anxiety and tremor.    The risks, benefits, alternatives and side effects of any medication changes have been discussed and are understood by the patient and other caregivers.    Review of systems:   Patient has tremors and pain (cramps from periods). Unintentional weight loss 10-15lbs in last month.   Patient denies acute concerns, appetite changes, energy changes, manic symptoms, AH, delusions, OCD, substance use    Objective:   BP (!) 153/90   Pulse 87   Temp 97.8  F (36.6  C) (Temporal)   Resp 16   Wt 132.1 kg (291 lb 3.2 oz)   LMP 12/02/2021 (Approximate)   SpO2 98%   BMI 53.26 kg/m    Weight is 291 lbs 3.2 oz  Body mass index is 53.26 kg/m .    Mental Status Exam:  Oriented to:  Grossly Oriented  General:  Awake and Alert  Appearance:  appears stated age  Behavior/Attitude:  calm and cooperative, " "open  Eye Contact:  appropriate  Psychomotor: normal no catatonia present  Speech:  appropriate volume/tone  Language: Fluent in English with appropriate syntax and vocabulary.  Mood:  \"feeling more down\"  Affect: euthymic, appropriate  Thought Process:  linear, logical, goal-oriented  Thought Content:  No SI/HI/AH/VH; No apparent delusions  Associations:  intact  Insight:  good   Judgment:  partial  Impulse control: fair  Attention Span:  grossly intact  Concentration:  grossly intact  Recent and Remote Memory:  grossly intact  Fund of Knowledge:  average  Muscle Strength and Tone: normal  Gait and Station: Normal        Allergies:     Allergies   Allergen Reactions     Amoxicillin-Pot Clavulanate Other (See Comments), Swelling and Rash     PN: facial swelling, left side. Also had cortisone injection the same day as she started the Augmentin.  Noted in downtime recovery of chart.    PN: facial swelling, left side. Also had cortisone injection the same day as she started the Augmentin.; HUT Comment: PN: facial swelling, left side. Also had cortisone injection the same day as she started the Augmentin.Noted in downtime recovery of chart.; HUT Reaction: Rash; HUT Reaction: Unknown; HUT Reaction Type: Allergy; HUT Severity: Med; HUT Noted: [id number]     Oseltamivir Hives     med stopped, PN: med stopped  med stopped, PN: med stopped; HUT Comment: med stopped, PN: med stopped; HUT Reaction: Hives; HUT Reaction Type: Allergy; HUT Severity: Med; HUT Noted: [id number]     Penicillins Anaphylaxis     HUT Reaction: Anaphylaxis; HUT Reaction Type: Allergy; HUT Severity: High; HUT Noted: [id number]     Vancomycin Itching, Swelling and Rash     Other reaction(s): Redness  Flushed face, very itchy; HUT Comment: Flushed face, very itchy; HUT Reaction: Angioedema; HUT Reaction: Redness; HUT Severity: Med; HUT Noted: [id number]    facial     Hydrocodone Nausea and Vomiting and GI Disturbance     vomiting for days, PN: vomiting for days; " HUT Comment: vomiting for days; HUT Reaction: Gastrointestinal; HUT Reaction: Nausea And Vomiting; HUT Reaction Type: Intolerance; HUT Severity: Med; HUT Noted: [id number]  vomiting for days       Acetaminophen Hives     Blood-Group Specific Substance Other (See Comments)     Patient has an anti-Cw and non-specific antibodies. Blood product orders may be delayed. Draw one red top and two purple top tubes for all type/screen/crossmatch orders.  Patient has anti-Cw, anti-K (Trenary), Warm auto and nonspecific antibodies. Blood products may be delayed. Draw patient 24 hours prior to transfusion. Draw one red top and two purple top tubes for all type and screen orders.     Clavulanic Acid Angioedema     Naltrexone Other (See Comments)     Reaction(s): Vivid dreams.  Reaction(s): Vivid dreams.       Oxycodone Swelling     Adhesive Tape Rash     Silicone type  Silicone type     Cephalosporins Rash     Lamotrigine Rash     Possibly associated with Lamictal.   HUT Comment: Possibly associated with Lamictal. ; HUT Reaction: Rash; HUT Reaction Type: Allergy; HUT Severity: Low; HUT Noted: [id number]     Latex Rash     HUT Reaction: Rash; HUT Reaction Type: Allergy; HUT Severity: Low; HUT Noted: [id number]        Labs:   No results found for this or any previous visit (from the past 24 hour(s)).       Assessment    Primary psychiatric diagnosis:   Factitious disorder  Borderline personality disorder    Secondary psychiatric diagnoses of concern this admission:   CANDICE  MDD  Complex PTSD severe, recurrent    Diagnostic Impression:  Nevin Alvarado is a 31 year old  female with a past psychiatric history of MDD, PTSD, Borderline Personality Disorder, factitious disorder, and long history of swallowing inedible objects. Patient self-presented to the ER on 02/10/2022 after ingesting 2 wires from a surgical mask in the context of several other recent ingestions. Patient reports medication adherence. She is currently followed by  Psychiatrist Kiersten Johnson, [facility] and PCP  Latonya Knight M.D at [facility]. Patient's support system includes family, county and outpatient team.  Substance use does not appear to be playing a contributing role in the patient's presentation.  There is genetic loading for mood and anxiety. Medical history does appear to be significant for obesity, chronic pain, autoimmune disorder.  Psychologically hx of trauma leading to chronic PTSD with maladaptive coping mechanism and SIB are a pivotal contributing factor to presentation. Socially, difficulty with interpersonal relationships and maintaining a job are factors affecting patient's ability to thrive in society. The MSE on admission was notable for low mood, anxious/exaggerated affect, pt being hypertalkative, tearful and feeling ashamed; no evidence of suicidal ideation or homicidal ideation, no evidence of psychotic thought, thoughts of self-harm, which are increased and no visual hallucinations present, no elicited delusions and impaired judgement.     Her reported symptoms of SIB, mood lability, poor frustration tolerance and impulsivity, and difficulty with interpersonal relationships are consistent with her historic diagnosis of underlying borderline personality disorder and complex PTSD.      Psychiatric Hospital course: Nevin Alvarado was admitted to [facility] as a voluntary patient. PTA meds were continued. New medications started at the time of admission include scheduled propanolol XR for both tremor and anxiety. This was approved by her legal guardian.    Medical course:   Patient was medically cleared for admission to psychiatric unit. S/p EGD by GI service to remove metal wires in ED. PTA medications were resumed.    Data:   UDS negative  TSH 4.94, T4  0.87  Lipids: triglycerides 266, otherwise WNL  CBC: WBC 14.9, otherwise WNL  Folate, Vit B12, Vit D,  normal  Amylase normal  BMP normal  Covid-19 negative  UPT negative    Plan      Today's changes:  -Start long acting propanolol 80 mg  -Start ibuprofen 600mg q6h prn for menstrual pain    Psychotropic Medications:  Scheduled:  Buspirone 20 mg TID  Desvenlafaxine 100 mg daily  Lurasidone 40 mg with supper  Pregabalin 100 mg TID  Propanolol SA 80mg daily    PRN:  -Maalox 30 ml Q4H PRN for indigestion  -Hydroxyzine 25-50 mg Q6H PRN for anxiety  -Melatonin 3mg at bedtime  -Miralax prn for constipation  -NRT  -Olanzapine 10 mg PO/IM prn Q2H severe agitation/psychosis  -Trazodone 50 mg PRN at bedtime  -Propanolol 10 mg prn daily   -Ibuprofen 600mg q6h prn for menstrual cramps    Additional Plans:  Patient will be treated in therapeutic milieu with appropriate individual and group therapies as described.      Medical diagnoses to be addressed this admission:    #Intention Tremor  Unclear etiology. No family history of essential tremor. No resting tremor or symptoms of parkinsonism.   -Propanolol SA 80mg daily    #DM vs prediabetes  Blood glucose 2/8/22 108.  -PTA metformin XR 1000mg daily    Consults: None    Legal Status: Voluntary     Safety Assessment:   Behavioral Orders   Procedures     Code 1 - Restrict to Unit     Routine Programming     As clinically indicated     Self Injury Precaution     Status 15     Every 15 minutes.     Status Individual Observation     Patient SIO status reviewed with team/RN.  Please also refer to RN/team documentation for add'l detail.    -SIO staff to monitor following which have contributed to patient being on SIO:  Intentional ingestion of non-food objects  -Possible interventions SIO staff could use to support patient's treatment progress:  Ensure environment does not contain swallow-able objects,  intervene if she reaches for small objects  -When following observed, team will review discontinuation of SIO:  Consistent safe behavior without attempts to obtain small objects.     Order Specific Question:   CONTINUOUS 24 hours / day     Answer:   Other     Order  Specific Question:   Specify distance     Answer:   1 foot when in the milieu or any space that has not been cleared of small objects. 5 feet in room, provided it and she have had swallow-able objects cleared.     Order Specific Question:   Indications for SIO     Answer:   Self-injury risk     Suicide precautions     Patients on Suicide Precautions should have a Combination Diet ordered that includes a Diet selection(s) AND a Behavioral Tray selection for Safe Tray - with utensils, or Safe Tray - NO utensils     Suicide precautions     Patients on Suicide Precautions should have a Combination Diet ordered that includes a Diet selection(s) AND a Behavioral Tray selection for Safe Tray - with utensils, or Safe Tray - NO utensils         SIO: Yes, 1:1    The patient does have moderate to good insight into her SIB.  She says that small metal objects are the most concerning triggers for her and that she has not had problems with things like plastic utensils or plastic markers.  She says paperclips, mickie pins, wire twist ties, and pen springs are the most dangerous for her, this is confirmed from looking at her medical record.  Dr. Ledesma is comfortable with allowing regular tray,  as long as she is on 1:1 and in her room. She has a hx of eating disorder so I dont want to place too much focus on mealtime restrictions because I am worried that will trigger her and be counterproductive.         Patient will eat in room    May have regular tray -  While on 1:1 and in her room.    Make sure meal tray cart is always locked to reduce access items that may be ingestible.      Patient's room is away from [facility] area to reduce access to objects.    No packaged toiletries, packaging, or small objects within her reach. (Is on her period, may have extra sanitary pads in her room to support patient dignity.) Addendum: may use hairbrush from home WITH PIN REMOVED.     Staff must search her room and ensure she does not have small  objects before allowing 5 feet from SIO.    Staff must inspect bathroom and shower looking for and removing small objects before she enters    Unit environment will be swept regularly by staff to remove any objects that could be ingested    Masks - use only the cloth masks, do not allow masks with wires or plastic    Staff must always observe hands.  This plan is in effect until changed by Jessica Avalos or Johanny Whiteside.    Disposition: To home likely early next week. Patient willing to stay over the weekend. Pending stabilization, medication optimization, & development of a safe discharge plan.    Attestation:   I was present with the medical student Rafita Pyle, MS3 who participated in the service and in the documentation of the note.  I have verified the history and personally performed the physical exam and medical decision making. I agree with the assessment and plan of care as documented in the note.    This patient was seen and discussed with my attending physician.  Yolanda Alonzo MD  PGY-1 Psychiatry Resident Physician      Attestation:  I, Marian Ledesma MD, have personally performed an examination of this patient and I have reviewed the resident's documentation.  I have edited the note to reflect all relevant changes.  I have discussed this patient with the house staff on 2/11/2022.  I agree with resident findings and plan in today's note and yesterdays resident H&P.  I have reviewed all vitals and laboratory findings.      I certifiy that the inpatient services were ordered in accordance with the Medicare regulations governing the order. This includes certification that hospital inpatient services are reasonable and necessary and in the case of services not specified as inpatient-only under 42 .22(n), that they are appropriately provided as inpatient services in accordance with the 2-midnight benchmark under 42 .3(e).     The reason for inpatient status is Self-injurious Behavior.    Marian Ledesma   M.D.,Ph.D.

## 2022-02-11 NOTE — PLAN OF CARE
Information from Dr. Ledesma:  The patient does have moderate to good insight into her SIB.  She says that small metal objects are the most concerning triggers for her and that she has not had problems with things like plastic utensils or plastic markers.  She says paperclips, mickie pins, wire twist ties, and pen springs are the most dangerous for her, this is confirmed from looking at her medical record.  Dr. Ledesma is comfortable with allowing regular tray,  as long as she is on 1:1 and in her room. She has a hx of eating disorder and it is important to not place too much focus on mealtime restrictions - to reduce risk for triggering.        Safety care plan:       Patient will eat in room     May have regular tray -  While on 1:1 and in her room.    Make sure meal tray cart is always locked to reduce access items that may be ingestible.    Patient's room is away from lounge area to reduce access to objects.    No packaged toiletries, packaging, or small objects within her reach. (Is on her period, may have extra sanitary pads in her room to support patient dignity.) May use her own brush.     Staff must search her room and ensure she does not have small objects before allowing 5 feet from SIO.     Staff must inspect bathroom and shower looking for and removing small objects before she enters    Unit environment will be swept regularly by staff to remove any objects that could be ingested    Masks - use only the cloth masks, do not allow masks with wires or plastic    Staff must always observe hands.    This plan is in effect until changed by Jessica Avalos or Johanny Whiteside.

## 2022-02-11 NOTE — PLAN OF CARE
"Pt has been intermittently visible in the milieu today. She sat in the lounge among peers, though she was minimally social with them. She attended OT groups, and she took a shower with the assistance of her SIO staff. She woke up around 0745 stating that she had started her period over night and was upset because she had \"ruined her underwear.\" She stated that she doesn't have periods very often and when she does, they are quite heavy. Pt did receive PRN Advil (600mg) for pain from uterine cramps. She also received PRN Zofran (4mg) for c/o nausea. Pt is currently laya for safety. She shared that objects that trigger her impulse to ingest/self-harm include office-related supplies such has paperclips and similar items. She states she is able to use eating utensils and hygiene products safety and has agreed to communicate openly with staff regarding SIB thoughts/urges. Medication compliant. No safety concerns at this time. Pt care plan re: SIO/ingestion is currently in the notes.      Problem: Behavioral Health Plan of Care  Goal: Plan of Care Review  Recent Flowsheet Documentation  Taken 2/11/2022 1325 by Lola Fournier  Plan of Care Reviewed With: patient  Patient Agreement with Plan of Care: agrees     "

## 2022-02-12 PROCEDURE — 250N000011 HC RX IP 250 OP 636: Performed by: STUDENT IN AN ORGANIZED HEALTH CARE EDUCATION/TRAINING PROGRAM

## 2022-02-12 PROCEDURE — 250N000013 HC RX MED GY IP 250 OP 250 PS 637: Performed by: STUDENT IN AN ORGANIZED HEALTH CARE EDUCATION/TRAINING PROGRAM

## 2022-02-12 PROCEDURE — 124N000002 HC R&B MH UMMC

## 2022-02-12 RX ADMIN — Medication 2000 UNITS: at 08:49

## 2022-02-12 RX ADMIN — METFORMIN ER 500 MG 1000 MG: 500 TABLET ORAL at 17:38

## 2022-02-12 RX ADMIN — PROPRANOLOL HYDROCHLORIDE 80 MG: 80 CAPSULE, EXTENDED RELEASE ORAL at 08:48

## 2022-02-12 RX ADMIN — BUSPIRONE HYDROCHLORIDE 20 MG: 10 TABLET ORAL at 20:27

## 2022-02-12 RX ADMIN — FERROUS SULFATE TAB 325 MG (65 MG ELEMENTAL FE) 325 MG: 325 (65 FE) TAB at 08:51

## 2022-02-12 RX ADMIN — PREGABALIN 100 MG: 100 CAPSULE ORAL at 18:54

## 2022-02-12 RX ADMIN — LURASIDONE HYDROCHLORIDE 40 MG: 40 TABLET, FILM COATED ORAL at 20:27

## 2022-02-12 RX ADMIN — BUSPIRONE HYDROCHLORIDE 20 MG: 10 TABLET ORAL at 08:50

## 2022-02-12 RX ADMIN — ONDANSETRON 4 MG: 4 TABLET, ORALLY DISINTEGRATING ORAL at 11:40

## 2022-02-12 RX ADMIN — BUSPIRONE HYDROCHLORIDE 20 MG: 10 TABLET ORAL at 14:09

## 2022-02-12 RX ADMIN — HYDROXYCHLOROQUINE SULFATE 200 MG: 200 TABLET, FILM COATED ORAL at 08:47

## 2022-02-12 RX ADMIN — PREGABALIN 100 MG: 100 CAPSULE ORAL at 14:09

## 2022-02-12 RX ADMIN — HYDROXYCHLOROQUINE SULFATE 200 MG: 200 TABLET, FILM COATED ORAL at 20:27

## 2022-02-12 RX ADMIN — CETIRIZINE HYDROCHLORIDE 10 MG: 10 TABLET, FILM COATED ORAL at 20:27

## 2022-02-12 RX ADMIN — DESVENLAFAXINE SUCCINATE 100 MG: 100 TABLET, EXTENDED RELEASE ORAL at 08:48

## 2022-02-12 RX ADMIN — IBUPROFEN 600 MG: 600 TABLET ORAL at 20:29

## 2022-02-12 RX ADMIN — PREGABALIN 100 MG: 100 CAPSULE ORAL at 08:52

## 2022-02-12 RX ADMIN — IBUPROFEN 600 MG: 600 TABLET ORAL at 09:17

## 2022-02-12 RX ADMIN — ONDANSETRON 4 MG: 4 TABLET, ORALLY DISINTEGRATING ORAL at 21:49

## 2022-02-12 NOTE — PLAN OF CARE
Problem: Adult Inpatient Plan of Care  Goal: Optimal Comfort and Wellbeing  Outcome: Improving   Problem: Suicidal Behavior   Problem: Activity and Energy Impairment (Anxiety Signs/Symptoms)  Goal: Optimized Energy Level (Anxiety Signs/Symptoms)  Outcome: Improving  Flowsheets (Taken 2/11/2022 2126)  Mutually Determined Action Steps (Optimized Energy Level): grooms self without prompting   Problem: Mood Impairment (Anxiety Signs/Symptoms)  Goal: Improved Mood Symptoms (Anxiety Signs/Symptoms)  Outcome: Improving  Flowsheets (Taken 2/11/2022 2126)  Mutually Determined Action Steps (Improved Mood Symptoms):   adheres to medication regimen   shares insight re: need for meds   Patient was intermittently visible, socially withdrawn from others, bright upon approach, denied thoughts of suicide and self harm ideations, insight appears to be improving, patient stated she feels better and that her mood is improving from baseline, she is able to adhere to medication regiment, able to verbalize safety check rationale and set future oriented goals, grooms self without prompts, she endorsed pain level of 6 due to menstrual cramps that is elevated with medication management, her blood pressure was elevated, she took her first dose schedule propanolol 80 mg extended release, she also has a prn propanolol 10 mg only given when parameters are met, she is medication complaint, denies any other feelings of discomfort, feels safe and was able to contract for safety,.

## 2022-02-12 NOTE — PLAN OF CARE
Problem: Sleep Disturbance (Anxiety Signs/Symptoms)  Goal: Improved Sleep (Anxiety Signs/Symptoms)  Outcome: Improving     Pt appeared to have slept for 7 hours. No PRN medication was given. Pt is on SIO for self-injury risk. Staff to continue to offer support to patients.

## 2022-02-13 ENCOUNTER — APPOINTMENT (OUTPATIENT)
Dept: GENERAL RADIOLOGY | Facility: CLINIC | Age: 32
DRG: 882 | End: 2022-02-13
Payer: COMMERCIAL

## 2022-02-13 ENCOUNTER — APPOINTMENT (OUTPATIENT)
Dept: CT IMAGING | Facility: CLINIC | Age: 32
DRG: 882 | End: 2022-02-13
Attending: PHYSICIAN ASSISTANT
Payer: COMMERCIAL

## 2022-02-13 PROCEDURE — 99207 PR CONSULT E&M CHANGED TO SUBSEQUENT LEVEL: CPT | Performed by: PHYSICIAN ASSISTANT

## 2022-02-13 PROCEDURE — 250N000013 HC RX MED GY IP 250 OP 250 PS 637: Performed by: STUDENT IN AN ORGANIZED HEALTH CARE EDUCATION/TRAINING PROGRAM

## 2022-02-13 PROCEDURE — 124N000002 HC R&B MH UMMC

## 2022-02-13 PROCEDURE — 71250 CT THORAX DX C-: CPT

## 2022-02-13 PROCEDURE — 71046 X-RAY EXAM CHEST 2 VIEWS: CPT

## 2022-02-13 PROCEDURE — 71250 CT THORAX DX C-: CPT | Mod: 26 | Performed by: RADIOLOGY

## 2022-02-13 PROCEDURE — 74176 CT ABD & PELVIS W/O CONTRAST: CPT | Mod: 26 | Performed by: RADIOLOGY

## 2022-02-13 PROCEDURE — 99232 SBSQ HOSP IP/OBS MODERATE 35: CPT | Performed by: PHYSICIAN ASSISTANT

## 2022-02-13 PROCEDURE — 71046 X-RAY EXAM CHEST 2 VIEWS: CPT | Mod: 26 | Performed by: RADIOLOGY

## 2022-02-13 RX ADMIN — Medication 2000 UNITS: at 09:40

## 2022-02-13 RX ADMIN — HYDROXYCHLOROQUINE SULFATE 200 MG: 200 TABLET, FILM COATED ORAL at 20:58

## 2022-02-13 RX ADMIN — DESVENLAFAXINE SUCCINATE 100 MG: 100 TABLET, EXTENDED RELEASE ORAL at 09:40

## 2022-02-13 RX ADMIN — LURASIDONE HYDROCHLORIDE 40 MG: 40 TABLET, FILM COATED ORAL at 20:58

## 2022-02-13 RX ADMIN — PREGABALIN 100 MG: 100 CAPSULE ORAL at 09:41

## 2022-02-13 RX ADMIN — BUSPIRONE HYDROCHLORIDE 20 MG: 10 TABLET ORAL at 10:39

## 2022-02-13 RX ADMIN — CETIRIZINE HYDROCHLORIDE 10 MG: 10 TABLET, FILM COATED ORAL at 20:58

## 2022-02-13 RX ADMIN — FERROUS SULFATE TAB 325 MG (65 MG ELEMENTAL FE) 325 MG: 325 (65 FE) TAB at 09:40

## 2022-02-13 RX ADMIN — PREGABALIN 100 MG: 100 CAPSULE ORAL at 15:33

## 2022-02-13 RX ADMIN — HYDROXYCHLOROQUINE SULFATE 200 MG: 200 TABLET, FILM COATED ORAL at 09:41

## 2022-02-13 RX ADMIN — PREGABALIN 100 MG: 100 CAPSULE ORAL at 20:58

## 2022-02-13 RX ADMIN — BUSPIRONE HYDROCHLORIDE 20 MG: 10 TABLET ORAL at 15:33

## 2022-02-13 RX ADMIN — METFORMIN ER 500 MG 1000 MG: 500 TABLET ORAL at 17:45

## 2022-02-13 RX ADMIN — BUSPIRONE HYDROCHLORIDE 20 MG: 10 TABLET ORAL at 20:58

## 2022-02-13 RX ADMIN — PROPRANOLOL HYDROCHLORIDE 80 MG: 80 CAPSULE, EXTENDED RELEASE ORAL at 09:40

## 2022-02-13 ASSESSMENT — ACTIVITIES OF DAILY LIVING (ADL)
ORAL_HYGIENE: INDEPENDENT
HYGIENE/GROOMING: INDEPENDENT
ORAL_HYGIENE: INDEPENDENT
DRESS: INDEPENDENT
HYGIENE/GROOMING: INDEPENDENT
LAUNDRY: WITH SUPERVISION
DRESS: WITH ASSISTANCE
LAUNDRY: WITH SUPERVISION

## 2022-02-13 NOTE — PROGRESS NOTES
Ethics Brief Note     Date: 2/13/2022  Time:  1550     Phone call with MASON Poon (covering this weekend).     Briefly, 31 year old female with history of depression, anxiety, borderline personality disorder, ADD, PTSD, eating disorder, factitious disorder, and self-injurious behavior including frequent ingestion of foreign bodies who was admitted to inpatient psychiatry after ingesting metal wires from face masks s/p EGD. Recent procedures 2/7 and 2/8.   Now with repeat ingestion of 7.5 cm plastic spoon handle. In this instance, low risk of 7.5 cm foreign body passing to small bowel and low risk of gastric perforation at this time. (Noted to have over 50 EGDs in last 3 years).     Medical team concerned about high concern for reinforcement of negative behaviors and secondary gain by patent.     Because patient currently stable and not in imminent risk, team is not compelled to offer emergent procedure.     Full consult and recommendations to follow  - Will follow up with core ethics consult team tomorrow.  - Will reach out to Dr. Stanton (GI) tomorrow     Royer Montgomery DO, MA  Pronouns: he/him/his  Clinical Ethicist - [facility]     -  Department of Family Medicine and Community Health      Please contact the service if we can be of any further assistance, service pager [phone].

## 2022-02-13 NOTE — CONSULTS
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 associated with Lamictal.   HUT Comment: Possibly associated with Lamictal. ; HUT Reaction: Rash; HUT Reaction Type: Allergy; HUT Severity: Low; HUT Noted: [id number]     Latex Rash     HUT Reaction: Rash; HUT Reaction Type: Allergy; HUT Severity: Low; HUT Noted: [id number]       Physical Exam   Vital Signs: Temp: 97.4  F (36.3  C) Temp src: Temporal BP: 123/71  Pulse: 93   Resp: 16 SpO2: 97 % O2 Device: None (Room air)    Weight: 291 lbs 3.2 oz    General Appearance:  Well developed, morbidly obese, female who appears stated age. Awake. Alert. Appears very comfortable lying in bed.   HEENT:  Unremarkable   Respiratory:  CTAB. Normal effort. No stridor or wheezing.   Cardiovascular:  RRR. S1, S2. No murmur.   GI:  Obese. Soft. Non-distended. Active BS. Tender in LUQ w/o   Skin: No visible rash.  Musculoskeletal:  No gross deformity.   Neurologic:  Grossly non-focal.  Psychiatric:  Flat affect.     Data   Recent Labs   Lab 02/08/22 2154 02/08/22  0551    139   POTASSIUM 4.8 4.1   CHLORIDE 108 105   CO2 24 23   ANIONGAP 8 11   BUN 17 12   CR 0.56 0.68   KELBY 9.5 9.5     Recent Labs   Lab 02/08/22 2154   WBC 14.9*   RBC 4.40   HGB 12.7   HCT 38.3   MCV 87   MCH 28.9   MCHC 33.2   RDW 13.9        No lab results found in last 7 days.  No lab results found in last 7 days.   Recent Labs   Lab 02/11/22  0844   TSH 4.94*   T4 0.87     No lab results found in last 7 days.  Recent Labs   Lab 02/08/22 2154 02/08/22  0551   * 164*        All labs personally reviewed in [facility].  See A&P for additional results.     Unresulted Labs Ordered in the Past 30 Days of this Admission     No orders found from 1/9/2022 to 2/9/2022.

## 2022-02-13 NOTE — PROGRESS NOTES
"  Brief Note - Cross Cover    S: Notified by the unit staff that patient has been having abdominal pain. Per chart, patient reported yesterday \"chipping away\" of the handle of a plastic spoon and swallowing the pieces. She was asymptomatic at that time, and put on SIO and finger food only (no utensils). Chest X-ray was ordered with a plan monitoring closely for signs of obstruction and bleeding. The on-call resident consulted the hospitalist, who agreed with this plan.  Impression of the x-ray: \"no radiodense foreign body was seen in the visualized chest or abdomen.\"    Today, patient reports LUQ abdominal pain that started early morning waking her up from sleep. Pain is sharp, with an intensity of 7/10 (10 being the worst) and does not radiate. No nausea or vomiting. No dysphagia, or regurgitation of undigested food.  Last bowel movement was yesterday. Patient reported today that she swallowed the handle of the plastic spoon.     O: /71 (BP Location: Left arm, Patient Position: Sitting)   Pulse 93   Temp 97.4  F (36.3  C) (Temporal)   Resp 16   Wt 132.1 kg (291 lb 3.2 oz)   LMP 12/02/2021 (Approximate)   SpO2 97%   BMI 53.26 kg/m      A/P:  Nevin Alvarado is a 31-year-old female with documented psychiatric diagnoses of MDD, PTSD, borderline personality disorder, CANDICE and factitious disorder with a history of swallowing foreign objects for self-harm. She was admitted on 2/10/22 due to concern for out of control behaviors and SIB (patient ingested metal wires from face masks had an EGD with retrieval on 2/9/22), mood lability, sleep issues, poor frustration tolerance, and impulsivity in the context of psychosocial stressors, including chronic mental health issues, family dynamic and maladaptive coping.     Given the LUQ pain reported as 7/10, likelihood of ingestion of a sharp or pointed plastic pieces, and hx of recurrent swallowing of foreign objects with esophageal perforation s/p stenting " "following ingestion of a paperclip and recent EGD on 2/9/22 to remove metal wires, patient needs a CT to determine whether further treatment options are needed. Discussed the patient with medicine, and the consult was ordered.     # Foreign body ingestion, r/o GI obstruction, perforation  - Medicine consult (see consult note for details)          - Chest Abdomen Pelvis CT w/o Contrast ordered by medicine   Impression:  Mildly hyperdense 7.5 cm foreign body in the stomach, compatible with history of ingesting the handle of a plastic tensile. No additional foreign body or evidence for perforation,obstruction, or other  acute intra-abdominal pathology.          - Transfer to medicine/GI for EGD (pending confirmation of timing)          - NPO           - Will monitor for any clinical changes or worsening of symptoms         -  Will page medicine for any acute concerns         -  Patient's guardian was updated by medicine    ADDENDUM:   Medicine discussed the pt with the GI team. GI did not feel that patient's transfer to inpatient medicine is necessary at this time. Per GI, no indication for urgent endoscopy due to \"overall low probability of passage of 7.5 cm long object into small bowel.\"  GI recommended Ethics Consult before proceeding with EGD as repeated procedures may reinforce the patient's self-harming behavior. Per chart, \"pt has had > 50 EGD's in the past 3 years in the [place] system alone. Likely > 100 if other health systems\")     Plan:  - Per GI, clear liquid diet overnight. Can consider advancement in AM.  - Contact medicine for any change in status, particularly if febrile, vital changes, or worsening abdominal pain with obvious evidence of distress.  - Ethics will follow up with core ethics consult team on 2/14/22 and reach out to GI.    ADDENDUM:   Notified by the unit staff at 3:19 am on 2/14/22 that the patient's abdominal pain is 9/10. Vitals are RR:20, BP:118/77, pulse:84, SpO2:96%. No obvious " signs of acute distress. She declined pain medication and requested that on-call the resident be notified. The abdominal exam has not changed per unit staff, and the patient appears stable. We will continue monitoring with the plan above.     (See Medicine, GI and Ethics notes from 2/13/22  for details).       Marilu Garsia MD  PGY-2 Psychiatry Resident

## 2022-02-13 NOTE — PROGRESS NOTES
Brief GI note:    31F with multiple psychiatric comorbidities, morbid obesity, recurrent intentional foreign body ingestions (most recently 2/8), who was admitted to [facility] psychiatry after ingesting metal wires from face masks s/p EGD w/ retrieval on 2/9/22 by GI. She reports ingestion of a plastic spoon handle last night. Due to concern for malingering, CXR was obtained which did not show foreign body. Followup CT A/P shows a 7.5cm linear density inside the stomach within a lot of food material.     #. Recurrent Foreign Body Ingestion   Pt has had > 50 EGD's in the past 3 years in the [place] system alone. Likely > 100 if other health systems are added to this tally.   Due to high concern for reinforcement of negative behaviors and overall low probability of passage of 7.5cm long object into small bowel, we recommend continued monitoring at this point with Ethics Consultation. Would appreciate ethics guidance in navigating this complex situation.    If any objective evidence of worsening abdominal pain (fevers, increased tenderness on exam), we can consider earlier EGD with general anesthesia.     Given no plans for emergent EGD, ok to advance patient to clear liquid diet overnight. Can consider advancement in AM.     Discussed with Dr. Stanton.     Ana Laura Paz MD  Gastroenterology Fellow  Pager [phone]

## 2022-02-13 NOTE — PROVIDER NOTIFICATION
Patient complained of sharp left abdominal pain. She insisted that she swallowed part of plastic spoon last night although x-ray was negative. On call psych was notified of the reported pain and hospitalist consult was ordered. She was seen by hospitalist and CT scan was ordered.

## 2022-02-13 NOTE — PLAN OF CARE
Problem: Suicidal Behavior  Goal: Suicidal Behavior is Absent or Managed  Outcome: Improving  Flowsheets (Taken 2/12/2022 2017)  Mutually Determined Action Steps (Suicidal Behavior Absent/Managed):    sets future-oriented goal    shares suicidal thoughts    verbalizes safety check rationale     Problem: Activity and Energy Impairment (Anxiety Signs/Symptoms)  Goal: Optimized Energy Level (Anxiety Signs/Symptoms)  Outcome: Improving  Flowsheets (Taken 2/12/2022 2017)  Mutually Determined Action Steps (Optimized Energy Level): participates in exercise activity     Problem: Cognitive Impairment (Anxiety Signs/Symptoms)  Goal: Optimized Cognitive Function (Anxiety Signs/Symptoms)  Outcome: Improving  Flowsheets (Taken 2/12/2022 2017)  Mutually Determined Action Steps (Optimized Cognitive Function): contributes to treatment plan   Patient was more out and visible, socially engaging with peers, more interactive, presented with blunted affect, however bright upon approach, medication compliant, reported increased insight and judgement, admits and accepts illness, able to rationalize and set future oriented goals, verbalized need, denies any thoughts of suicide and self harm ideations, was able to contract for safety and stated she will notify staff in the even she feels urges to ingest any foreign object, vitals were stable, ate dinner well, hygiene was adequate, continues to c/o menstrual cramps, she was given hot packs and Ibuprofen to which she reported minimal effect, pt towards the end of the shift c/o diarrhea like episodes, we are currently monitoring closely, no other concerns reported or noted.

## 2022-02-13 NOTE — PLAN OF CARE
"  Problem: Sleep Disturbance (Anxiety Signs/Symptoms)  Goal: Improved Sleep (Anxiety Signs/Symptoms)  Outcome: Improving    At the change of shift, patient's 1:1 staff alerted the nurse that the patient reported that she has ingested a part of a plastic spoon ( the handle part of it). Patient was assessed, and the patient denied pain or discomfort, vital signs were stable. Upon further inquiring from the patient, she stated, \" I swallowed part of the spoon that I got from my snack box.\"  She further explained to the staff that she used her finger nails to break the plastic spoon and hide it under her pillow, and ingested it when she was  facing the window in her room. Per staff on  1:1, patient asked her to assist in writing down her goals about ways to adhere to treatment regimen. Patient said as staff was writing down what she  was telling her,  she used the opportunity to ingest  a part of the plastic spoon. Patient was assessed and  kept comfortable, denied pain or discomfort. Pt room was searched to make sure there was no other objects for possible ingestion. on-call doctor updated, X-ray was ordered.  X-ray shows no evidence of object in the pt body at this time. Patient denied shortness of breath, discomfort, denied suicidal ideation, and contracted for safety. Continuous monitoring of patient was initiated by the on-call doctor, no-linen  in room, and patient to continue on 1:1 staff to promote safety.     Pt appeared to have slept for 4.5  hours. At 0500,  pt complained of pain, and before staff intervened, pt went back to sleep. No PRN medication was given. Pt is on SIO for self-injury risk, and ingestion. Staff to continue to offer support to patients.    "

## 2022-02-13 NOTE — PROGRESS NOTES
"Received pt awake sitting in bed.  Pt requested toileting.  Staff assisted pt with hygenic measures then pt apologized to staff for requiring assistance. Then pt walked towards the window w/ staff following.  While looking out the window, pt asked \"where is the emergency room\"? Pt talked about her weight and reported that her doctor told her she needs to exercise and eat healthy. This staff told pt about yoga class, pilates class, and affordable membership plans locally.  Pt turned away from the window and sat on her bed. This staff went and sat on the chair with continual 1:1 visibility.  Pt asked for a sheet of paper.  Staff offered and wrote this health information.  While attempting to hand this information to pt, she immediately endorsed that she had just swallowed a partial piece of a plastic spoon which she further stated that she had retreived from her snack tray given to her earlier by her previous SIO staff.  Endorsed \"chipping away at it for 30 minutes\" while under her covers and then placed it under her pillow. When inquiring regarding pt ability to talk w/o difficulty, pt stated that she had been doing this for seven years and could multi-task she could swallow and speak at the same time.  Gave conflicting accounts of timing and swallowing of the piece of plastic to different staff persons.  Visually saw pt remove a piece of plastic from underneath her pillow while stating that she had swallowed a second piece of the same plastic item once nurse has been notified and was visiting the pt. Pt was monitored continually since this staff assumed responsibility for pt care w/o any visibility or evident of pt having swallowed any contraband. Per RN pt search was conducted with pt's cooperation and pt relenquish a piece of the plastic to the nurse when requested to do so.    "

## 2022-02-13 NOTE — PROGRESS NOTES
"Brief Cross Cover Note    Subjective:  Notified by nursing that patient reporting she swallowed part of a broken plastic spoon. Ingestion was not witnessed. Patient did display part of a plastic spoon with handle missing. Patient got the spoon during snack time and reports \"chipping away at it for 30 minutes\" and hiding piece in her bed. Patient on SIO. Spoke with staff who indicated that patient was speaking during the time she claims to have swallowed the spoon. Patient says she was able to \"multi-task\" and speak/swallow at the same time. Patient denies any shortness of breath/difficulty breathing, difficulty swallowing, or difficulty speaking since ingestion.    Objective:  /83 (BP Location: Left arm, Patient Position: Sitting)   Pulse 84   Temp 98  F (36.7  C) (Temporal)   Resp 14   Wt 132.1 kg (291 lb 3.2 oz)   LMP 12/02/2021 (Approximate)   SpO2 96%   BMI 53.26 kg/m        Assessment & Plan  Nevin Alvarado is a 31 year old female with previous psychiatric diagnoses of MDD, PTSD, borderline personality disorder, CANDICE, factitious disorder who presented with recent self-injurious behavior (ingestion). Given her extensive, previous history of self-injurious ingestions, it is not unreasonable to believe that she did swallow an object this evening. She is currently asymptomatic. Will order chest x-ray to investigate further, though unclear if plastic spoon is radiopaque.  Continue to monitor for signs of obstruction, or GI bleed. Hospitalist on call, was consulted and is in agreement with this plan.     - chest X-ray ordered  - monitor closely for signs of obstruction, bleeding  - patient to have finger foods only/no access to plastic utensils this weekend until primary team can re-evaluate    Jennifer Bowman MD  PGY-2 Psychiatry Resident    Addendum:    Preliminary read of Xray :    Impression:   1. No radiodense foreign body is seen in the visualized chest or  abdomen.  If there is further " clinical concern, consider further  evaluation with soft tissue neck and abdominal radiographs.  2. No focal airspace disease.    Further discussed with hospitalist on call. Determined no further imaging need at this time. Will continue to monitor closely. Given history of factitious disorder and that patient has been quite suggestible in terms of symptoms, will focus on objective measures as much as possible. Nursing to obtain full set of vitals in AM and spot check O2. Day team can consider consulting with GI if further concerns in the morning.

## 2022-02-13 NOTE — PLAN OF CARE
"  Problem: Adult Inpatient Plan of Care  Goal: Plan of Care Review  Outcome: No Change  Flowsheets  Taken 2/13/2022 1248  Plan of Care Reviewed With: patient  Progress: no change  Taken 2/13/2022 1135  Plan of Care Reviewed With: patient     Problem: Mood Impairment (Anxiety Signs/Symptoms)  Goal: Improved Mood Symptoms (Anxiety Signs/Symptoms)  Outcome: Improving     During assessment this morning, patient denied all psych symptoms and appeared irritable. The irritability would dissipate as the day progressed. She complained of sharp, left-sided abdominal pain and said she believed it was from her \"swallowing part of a spoon\" last night. On-call psychiatrist was notified and an Internal Medicine consult was ordered. Internal Medicine came to see patient in room and ordered a Chest and abdominal/pelvis CT scan without contrast, which is pending at the time of making this entry. Patient continues to be on SIO status due to history of ingesting foreign objects. She told writer that she regretted having \"swallowed\" the spoon and contracted to avoid ingesting foreign objects in the future. For the latter part of the shift, patient showed some insight into her behavior and acknowledged that staff were dong \"the best they can\" to keep her safe. Patient is waiting to go for the CT scan at the time of making this entry. She currently rates her abdominal pain at 6/10. Will continue to monitor.  "

## 2022-02-14 LAB — SARS-COV-2 RNA RESP QL NAA+PROBE: NEGATIVE

## 2022-02-14 PROCEDURE — 250N000013 HC RX MED GY IP 250 OP 250 PS 637: Performed by: STUDENT IN AN ORGANIZED HEALTH CARE EDUCATION/TRAINING PROGRAM

## 2022-02-14 PROCEDURE — 99232 SBSQ HOSP IP/OBS MODERATE 35: CPT | Mod: GC | Performed by: PSYCHIATRY & NEUROLOGY

## 2022-02-14 PROCEDURE — 124N000002 HC R&B MH UMMC

## 2022-02-14 PROCEDURE — U0003 INFECTIOUS AGENT DETECTION BY NUCLEIC ACID (DNA OR RNA); SEVERE ACUTE RESPIRATORY SYNDROME CORONAVIRUS 2 (SARS-COV-2) (CORONAVIRUS DISEASE [COVID-19]), AMPLIFIED PROBE TECHNIQUE, MAKING USE OF HIGH THROUGHPUT TECHNOLOGIES AS DESCRIBED BY CMS-2020-01-R: HCPCS | Performed by: NURSE PRACTITIONER

## 2022-02-14 PROCEDURE — 250N000011 HC RX IP 250 OP 636: Performed by: STUDENT IN AN ORGANIZED HEALTH CARE EDUCATION/TRAINING PROGRAM

## 2022-02-14 RX ADMIN — PROPRANOLOL HYDROCHLORIDE 10 MG: 10 TABLET ORAL at 18:28

## 2022-02-14 RX ADMIN — IBUPROFEN 600 MG: 600 TABLET ORAL at 04:17

## 2022-02-14 RX ADMIN — HYDROXYCHLOROQUINE SULFATE 200 MG: 200 TABLET, FILM COATED ORAL at 19:26

## 2022-02-14 RX ADMIN — FERROUS SULFATE TAB 325 MG (65 MG ELEMENTAL FE) 325 MG: 325 (65 FE) TAB at 08:24

## 2022-02-14 RX ADMIN — PREGABALIN 100 MG: 100 CAPSULE ORAL at 19:26

## 2022-02-14 RX ADMIN — CETIRIZINE HYDROCHLORIDE 10 MG: 10 TABLET, FILM COATED ORAL at 19:27

## 2022-02-14 RX ADMIN — PREGABALIN 100 MG: 100 CAPSULE ORAL at 14:36

## 2022-02-14 RX ADMIN — BUSPIRONE HYDROCHLORIDE 20 MG: 10 TABLET ORAL at 14:36

## 2022-02-14 RX ADMIN — LURASIDONE HYDROCHLORIDE 40 MG: 40 TABLET, FILM COATED ORAL at 19:26

## 2022-02-14 RX ADMIN — METFORMIN ER 500 MG 1000 MG: 500 TABLET ORAL at 17:56

## 2022-02-14 RX ADMIN — ONDANSETRON 4 MG: 4 TABLET, ORALLY DISINTEGRATING ORAL at 03:16

## 2022-02-14 RX ADMIN — PROPRANOLOL HYDROCHLORIDE 80 MG: 80 CAPSULE, EXTENDED RELEASE ORAL at 08:24

## 2022-02-14 RX ADMIN — DESVENLAFAXINE SUCCINATE 100 MG: 100 TABLET, EXTENDED RELEASE ORAL at 08:25

## 2022-02-14 RX ADMIN — ONDANSETRON 4 MG: 4 TABLET, ORALLY DISINTEGRATING ORAL at 12:14

## 2022-02-14 RX ADMIN — PREGABALIN 100 MG: 100 CAPSULE ORAL at 08:24

## 2022-02-14 RX ADMIN — HYDROXYCHLOROQUINE SULFATE 200 MG: 200 TABLET, FILM COATED ORAL at 08:24

## 2022-02-14 RX ADMIN — BUSPIRONE HYDROCHLORIDE 20 MG: 10 TABLET ORAL at 19:26

## 2022-02-14 RX ADMIN — Medication 2000 UNITS: at 08:24

## 2022-02-14 RX ADMIN — BUSPIRONE HYDROCHLORIDE 20 MG: 10 TABLET ORAL at 08:24

## 2022-02-14 NOTE — PLAN OF CARE
Assessment/Intervention/Current Symptoms and Care Coordination: Met with team. Reviewed patient's chart and progress notes. Patient ingested part of spoon on 2/13/2022. Complex care plan was developed to ensure safety and prevent future SIB. While meeting with team patient expressed guild and remorse for the incident. Patient also stated that what precipitated her SIB urges was a conversation she had with another peer where this was very descriptive on how he will engaged in SIB while in the unit. Writer called patient's therapist, Rosio at [phone]. Writer left  requesting a call back.          Discharge Plan or Goal: No discharge disposition at this time due to active SIB.        Barriers to Discharge: Engaged in SIB by ingestion. Needs clinical stabilization and medication management.          Referral Status:         Legal Status: Has Legal Guardian

## 2022-02-14 NOTE — PLAN OF CARE
"  Problem: Behavioral Health Plan of Care  Goal: Patient-Specific Goal (Individualization)  Outcome: Declining     Problem: Behavioral Health Plan of Care  Goal: Adheres to Safety Considerations for Self and Others  Outcome: Declining       Pt visible in the milieu, sat by [facility] area closely watched by 2 SIO staff. At the beginning of the shift, pt was on clear liquid diet, then changed to NPO.  In the morning, pt complained of nausea, was administered Zofran that proved effective. Pt complained of RUQ pain that is constant at 9/10 pain level. Was alleviated a little when she used used the recliner (8/10)as compared to when she was laying down (9/10).     Upon room check in the morning, this writer found under the bed a clear plastic item that looks like a cut from a small straw 1 inch in length.  When pt was asked, pt denies and swears not knowing anything about it. Pt was even concerned if this will be reported to the team, and was a little tearful. Pt said that the SIB was not anything like to end her life, but was initially to get attention and now feels like an \"addiction\". Provided emphatic listening to pt. Pt continues to be on NPO and 2:1 SIO.    "

## 2022-02-14 NOTE — PLAN OF CARE
Upon shift start patient c/o pain level of 7/10 around her left upper abdominal quadrant that was sharp, constant and worse with activity, when writer assessed the abdominal region no distension or warmth noted, no facial grimacing noted, patient intermittently guarded the region but denied any tenderness and refused medication management when offered for pain relief, vitals were stable, no apparent obvious signs of physical manifestation of pain noted, patient was out visible engaging, social and working on art pieces with peers out in the lounge, intermittently in her room laying supine, no signs of distress noted,    Affect was flat and blunted, she denied thoughts of suicide and self harm ideations, patient reported that the ingestion was a fleeting thought last night and she was apologetic, patient's feelings and thoughts were acknowledged and reassurance provided.      Patient continuously asked about the progression of the treatment plan for the removal of the foreign object, the plan of care was explained to the patient and she was receptive. Patient is currently being monitored closely for any changes with condition, both internal medicine and the psychiatry teams are notified of patient's progressive state, currently there is no obvious signs of distress and patient appears stable, she is on clear liquid diet, was able to tolerate, denied nausea, no bowel movement noted during shift.       Problem: Behavioral Health Plan of Care  Goal: Adheres to Safety Considerations for Self and Others  Outcome: Declining  Goal: Optimized Coping Skills in Response to Life Stressors  Outcome: Declining     Problem: Social, Occupational or Functional Impairment (Anxiety Signs/Symptoms)  Goal: Enhanced Social, Occupational or Functional Skills (Anxiety Signs/Symptoms)  Intervention: Promote Social, Occupational and Functional Ability  Recent Flowsheet Documentation  Taken 2/13/2022 1732 by Delma Alexander RN  Trust  Relationship/Rapport:    thoughts/feelings acknowledged    reassurance provided    empathic listening provided    emotional support provided

## 2022-02-14 NOTE — PROGRESS NOTES
"    ----------------------------------------------------------------------------------------------------------  [facility], [place]   Psychiatric Progress Note  Hospital Day #4    Identifier: Nevin Alvarado is a 31 year old female with previous psychiatric diagnoses of MDD, PTSD, borderline personality disorder, CANDICE, factitious disorder who presents with recent self-injurious behavior (ingestion). Admission was voluntary. Given her extensive, previous history of self-injurious ingestions, assessment is that the current presentation is consistent with self-injurious ingestion related to previous diagnosis of BPD.     Interim History:   The patient's care was discussed with the treatment team and chart notes were reviewed.    Vitals: Hypertensive (153/90), otherwise VSS  Sleep: 4 hours (02/14/22 0624)  Scheduled medications: Took all scheduled psychiatric medications as prescribed  PRN medications: No psychiatric prns given. Ibuprofen 2/12 x2 and 2/14 for abdominal pain    Staff Report:   Per chart review, pt ingested 7.5 cm of plastic spoon handle (seen on CT) over weekend while on 1:1 SIO. GI consulted, did not pursue emergent EGD due to low perforation risk (patient also afebrile, VSS, and not in distress) and wanted ethics consult prior to treatment given patient's extensive history of EGDs. Patient was initially asymptomatic, but has since reported sharp LUQ pain post ingestion with worst pain being 9/10. No signs of distress later during groups.     Subjective:     Patient Interview:  Nevin Alvarado was interviewed in bedroom. Reports feeling 9/10 sharp pain and nausea from ingestion of spoon handle over weekend. She's been feeling a lot of shame, embarrassment, and regret from the ingestion.     Prior to the ingestion, she was talking with a peer who was saying that \"no one in this hospital cares if you self-harm and that I plan to cut myself today using x item, etc.\" " This made her very anxious and uncomfortable, says that talking about self harm is a trigger for her. She describes the ingestion as impulsive, that he had brought a plastic spoon into her room (inside her walker) to eat a snack later and then ingested it during 1:1 session while staff member was busy writing something down. She denies SI or that this was an attempt to end her life. She was unable to describe motivation for ingestion outside of the triggering conversation with peer.    Discussed that increased restrictions would be placed after this event and that it would take some time for GI to remove the spoon handle. Told her we would need to communicate again with GI before EGD is done and that she would likely discharge then be readmitted after procedure. She asked if she could have a deck of cards in room which team declined for now due to recent events. Pt told that restrictions would potentially be lifted slowly on a day to day basis.     Pt also reports propanolol has improved her anxiety symptoms during the daytime but she still gets anxious in the evening. She denies using her inhaler frequently and that her concerns for asthma were low. She reports she only had to use her inhaler recently when she was sick with pneumonia. She would like to stay on propanolol for now and reassess at a later date. Risks of decreased effectiveness of rescue albuterol by continuing propanolol was discussed and understood by pt.    Phone call with Luke ([employer]), guardian:  Discussed ingestion and weekend events with guardian. He consents to EGD and related cares. He was also made aware of and agrees with elevated restrictions made for patient safety.     The risks, benefits, alternatives and side effects of any medication changes have been discussed and are understood by the guardian.    Review of systems:   Patient has tremors and pain (cramps from periods). Previously reported unintentional weight loss  "10-15lbs in last month.   Patient denies acute concerns, appetite changes, energy changes, manic symptoms, AH, delusions, OCD, substance use    Objective:   BP (!) 141/84 (BP Location: Left arm, Patient Position: Sitting)   Pulse 78   Temp 96.9  F (36.1  C) (Temporal)   Resp 18   Wt 132.1 kg (291 lb 3.2 oz)   LMP 12/02/2021 (Approximate)   SpO2 98%   BMI 53.26 kg/m    Weight is 291 lbs 3.2 oz  Body mass index is 53.26 kg/m .    Mental Status Exam:  Oriented to:  Grossly Oriented  General:  Awake and Alert  Appearance:  appears stated age and, lying down due to stomach in pain  Behavior/Attitude:  calm and cooperative, open  Eye Contact:  avoids or evasive and glances  Psychomotor: normal no catatonia present  Speech:  appropriate volume/tone  Language: Fluent in English with appropriate syntax and vocabulary.  Mood:  \"ashamed, regretful, embarrassed\"  Affect: sad  Thought Process:  linear, logical, goal-oriented  Thought Content:  No SI/HI/AH/VH; No apparent delusions  Associations:  intact  Insight:  good   Judgment:  poor  Impulse control: very poor  Attention Span:  grossly intact  Concentration:  grossly intact  Recent and Remote Memory:  grossly intact  Fund of Knowledge:  average  Muscle Strength and Tone: normal  Gait and Station: Normal        Allergies:     Allergies   Allergen Reactions     Amoxicillin-Pot Clavulanate Other (See Comments), Swelling and Rash     PN: facial swelling, left side. Also had cortisone injection the same day as she started the Augmentin.  Noted in downtime recovery of chart.    PN: facial swelling, left side. Also had cortisone injection the same day as she started the Augmentin.; HUT Comment: PN: facial swelling, left side. Also had cortisone injection the same day as she started the Augmentin.Noted in downtime recovery of chart.; HUT Reaction: Rash; HUT Reaction: Unknown; HUT Reaction Type: Allergy; HUT Severity: Med; HUT Noted: [id number]     Oseltamivir Hives     med " stopped, PN: med stopped  med stopped, PN: med stopped; HUT Comment: med stopped, PN: med stopped; HUT Reaction: Hives; HUT Reaction Type: Allergy; HUT Severity: Med; HUT Noted: [id number]     Penicillins Anaphylaxis     HUT Reaction: Anaphylaxis; HUT Reaction Type: Allergy; HUT Severity: High; HUT Noted: [id number]     Vancomycin Itching, Swelling and Rash     Other reaction(s): Redness  Flushed face, very itchy; HUT Comment: Flushed face, very itchy; HUT Reaction: Angioedema; HUT Reaction: Redness; HUT Severity: Med; HUT Noted: [id number]    facial     Hydrocodone Nausea and Vomiting and GI Disturbance     vomiting for days, PN: vomiting for days; HUT Comment: vomiting for days; HUT Reaction: Gastrointestinal; HUT Reaction: Nausea And Vomiting; HUT Reaction Type: Intolerance; HUT Severity: Med; HUT Noted: [id number]  vomiting for days       Acetaminophen Hives     Blood-Group Specific Substance Other (See Comments)     Patient has an anti-Cw and non-specific antibodies. Blood product orders may be delayed. Draw one red top and two purple top tubes for all type/screen/crossmatch orders.  Patient has anti-Cw, anti-K ([place]), Warm auto and nonspecific antibodies. Blood products may be delayed. Draw patient 24 hours prior to transfusion. Draw one red top and two purple top tubes for all type and screen orders.     Clavulanic Acid Angioedema     Naltrexone Other (See Comments)     Reaction(s): Vivid dreams.  Reaction(s): Vivid dreams.       Oxycodone Swelling     Adhesive Tape Rash     Silicone type  Silicone type     Cephalosporins Rash     Lamotrigine Rash     Possibly associated with Lamictal.   HUT Comment: Possibly associated with Lamictal. ; HUT Reaction: Rash; HUT Reaction Type: Allergy; HUT Severity: Low; HUT Noted: [id number]     Latex Rash     HUT Reaction: Rash; HUT Reaction Type: Allergy; HUT Severity: Low; HUT Noted: [id number]        Labs:   No results found for this or any previous visit (from the past 24  hour(s)).       Assessment    Primary psychiatric diagnosis:   Factitious disorder  Borderline personality disorder    Secondary psychiatric diagnoses of concern this admission:   CANDICE  MDD  Complex PTSD severe, recurrent    Diagnostic Impression:  Nevin Alvarado is a 31 year old  female with a past psychiatric history of MDD, PTSD, Borderline Personality Disorder, factitious disorder, and long history of swallowing inedible objects. Patient self-presented to the ER on 02/10/2022 after ingesting 2 wires from a surgical mask in the context of several other recent ingestions. Patient reports medication adherence. She is currently followed by Psychiatrist Kiersten Johnson, [facility] and PCP  Latonya Knight M.D at [facility]. Patient's support system includes family, county and outpatient team.  Substance use does not appear to be playing a contributing role in the patient's presentation.  There is genetic loading for mood and anxiety. Medical history does appear to be significant for obesity, chronic pain, autoimmune disorder.  Psychologically hx of trauma leading to chronic PTSD with maladaptive coping mechanism and SIB are a pivotal contributing factor to presentation. Socially, difficulty with interpersonal relationships and maintaining a job are factors affecting patient's ability to thrive in society. The MSE on admission was notable for low mood, anxious/exaggerated affect, pt being hypertalkative, tearful and feeling ashamed; no evidence of suicidal ideation or homicidal ideation, no evidence of psychotic thought, thoughts of self-harm, which are increased and no visual hallucinations present, no elicited delusions and impaired judgement. The ingestion of a spoon handle on 2/12 was further emblematic of poor coping skills, poor distress tolerance, and impulsivity. Her reported symptoms of SIB, mood lability, poor frustration tolerance and impulsivity, and difficulty with interpersonal  relationships are consistent with her historic diagnosis of underlying borderline personality disorder and complex PTSD.      Psychiatric Hospital course:   Nevin Alvarado was admitted to [facility] as a voluntary patient. PTA meds were continued. New medications started at the time of admission include scheduled propanolol XR for both tremor and anxiety. This was approved by her legal guardian. On 2/12, patient ingested the handle of a spoon following a triggering conversation with peer. Patient did not describe motivation for event outside of the triggering conversation.    Medical course:   Patient was medically cleared for admission to psychiatric unit. S/p EGD by GI service to remove metal wires in ED. PTA medications were resumed. Pt ingested 7.5 cm plastic spoon handle on 2/12/22. Medicine consulted and 7.5cm foreign body was confirmed via CT abdomen. GI was consulted, EGD not pursued emergently due to patient stability, low risk of perforation, entry into small bowel, and concerns regarding negative reinforcement for ingestion behavior. Ethics consulted by GI and recommended proceeding with EGD. EGD tentatively scheduled for 2/15.    Data:   UDS negative  TSH 4.94, T4  0.87  Lipids: triglycerides 266, otherwise WNL  CBC: WBC 14.9, otherwise WNL  Folate, Vit B12, Vit D,  normal  Amylase normal  BMP normal  Covid-19 negative  UPT negative    Plan     Today's changes:   - 2:1 SIO with increased restrictions for ingestion risk (see SIO section below)  - Water only until midnight, NPO at midnight for tentative EGD in AM  -Psyllium made prn    Psychotropic Medications:  Scheduled:  Buspirone 20 mg TID  Desvenlafaxine 100 mg daily  Lurasidone 40 mg with supper  Pregabalin 100 mg TID  Propanolol SA 80mg daily    PRN:  -Maalox 30 ml Q4H PRN for indigestion  -Hydroxyzine 25-50 mg Q6H PRN for anxiety  -Melatonin 3mg at bedtime  -Miralax prn for constipation  -NRT  -Olanzapine 10 mg PO/IM prn Q2H severe  "agitation/psychosis  -Trazodone 50 mg PRN at bedtime  -Propanolol 10 mg prn daily   -Ibuprofen 600mg q6h prn for menstrual cramps  -Psyllium daily prn    Additional Plans:  Patient will be treated in therapeutic milieu with appropriate individual and group therapies as described.    Medical diagnoses to be addressed this admission:    #Foreign body ingestion   Pt ingested 7.5 cm plastic spoon handle on 2/12/22. Medicine consulted and 7.5cm foreign body was confirmed via CT abdomen. GI was consulted, EGD not pursued emergently due to patient stability, low risk of perforation, entry into small bowel, and concerns regarding negative reinforcement for ingestion behavior. Ethics consulted by GI, and recommended proceeding with EGD. EGD tentatively scheduled for 2/15.  -Per GI, water only until midnight, NPO at midnight for tentative EGD in AM    #Intention Tremor  Unclear etiology. No family history of essential tremor. No resting tremor or symptoms of parkinsonism.   -Propanolol SA 80mg daily    #DM vs prediabetes  Blood glucose 2/8/22 108.  -PTA metformin XR 1000mg daily    #Subclinical hypothyroidism  TSH 4.94, T4  0.87. Asymptomatic.     Consults:   --Medicine/GI for foreign body ingestion (2/13/22):  \"CT shows 7.5 cm foreign body in stomach. Case discussed with GI team. Foreign body unlikely to pass out of stomach given size. GI team does not feel that transfer to inpatient medicine is necessary at this time given lower risk of gastric perforation. No indication for urgent endoscopy at this time. Given history of recurrent foreign body ingestion possibly for secondary gain, GI recommends ethics consult prior to proceeding as repeat procedures may continue to encourage future behaviors.   - Continue NPO status for now  - Will reassess EGD plan and diet in AM  - Contact medicine for any change in status, particularly if febrile, vital changes, or worsening pain with obvious evidence of distress    Update following " "conversation 2/14 PM: Will proceed with EGD following their conversation with ethics committee. Patient tentatively scheduled for EGD on [place] 2/15. Will need general anesthesia due to risk of injury to lung given nature of ingested object. Patient may drink water only until midnight, then strict NPO at midnight.\"    --Full Ethics Consult 2/14, pending note. Per conversation, recommend proceeding with EGD.    Legal Status: Voluntary with legal guardian    Safety Assessment:   Behavioral Orders   Procedures     Code 1 - Restrict to Unit     Code 2     For xray     Routine Programming     As clinically indicated     Self Injury Precaution     Status 15     Every 15 minutes.     Status Individual Observation     2:1 observation. Patient SIO status reviewed with team/RN.  Please also refer to RN/team documentation for add'l details - detailed plan in documentation.     Order Specific Question:   CONTINUOUS 24 hours / day     Answer:   1 foot     Order Specific Question:   Indications for SIO     Answer:   Self-injury risk     Suicide precautions     Patients on Suicide Precautions should have a Combination Diet ordered that includes a Diet selection(s) AND a Behavioral Tray selection for Safe Tray - with utensils, or Safe Tray - NO utensils     Suicide precautions     Patients on Suicide Precautions should have a Combination Diet ordered that includes a Diet selection(s) AND a Behavioral Tray selection for Safe Tray - with utensils, or Safe Tray - NO utensils         SIO: Yes, 2:1    Information from Dr. Ledesma:    The patient does have moderate to good insight into her SIB.  She says that small metal objects are the most concerning triggers for her and that she has not had problems with things like plastic utensils or plastic markers.  She says paperclips, mickie pins, wire twist ties, and pen springs are the most dangerous for her, this is confirmed from looking at her medical record.  he has a hx of eating " disorder and it is important to not place too much focus on mealtime restrictions - to reduce risk for triggering.        Safety care plan:              2:1 staff with 1 foot distance. At least 1 female staff (See SIO order.)              Staff must always observe hands.              No bathroom door              Room lights and bathroom light on 24 hours per day              Staff members must not have swallowable objects on persons (such as pens).              Patient will eat in room.              Finger foods only, remove packaging outside of room before given to patient.              Make sure all items from tray are removed from room.              Make sure meal tray cart is always locked to reduce access items that may be ingestible.   Patient's room is away from lounge area to reduce access to objects.              No packaged toiletries, packaging, or small objects within her reach. (Is on her period, may have extra sanitary pads in her room to support patient dignity.) May use her own brush.              Room sweep prior to patient entering room              Room sweep at least twice (with accountability checks).              Staff must inspect bathroom and shower looking for and removing small objects before she enters              Unit environment will be swept regularly by staff to remove any objects that could be ingested              Masks - use only the cloth masks, do not allow masks with wires or plastic              Planned time outside of room - when patient is out of room which must be planned, environment is swept, and patient room is swept when returns.              No hairbrush, no personal shampoo.              No groups with any supplies except paper (no staples or paperclips on paper)    This plan is in effect until changed by Jessica Avalos or Johanny Whiteside.     Disposition: Pending stabilization, medication optimization, & development of a safe discharge plan.    Attestation:   I was present  with the medical student Rafita Pyle MS3 who participated in the service and in the documentation of the note.  I have verified the history and personally performed the physical exam and medical decision making. I agree with the assessment and plan of care as documented in the note.    This patient was seen and discussed with my attending physician.  Yolanda Alonzo MD  PGY-1 Psychiatry Resident Physician    Attestation:  This patient has been seen and evaluated by me, Marian Ledesma MD.  I have discussed this patient with the house staff team including the resident and medical student and I agree with the findings and plan in this note.    I have reviewed today's vital signs, medications, labs and imaging. Marian Ledesma MD , PhD.

## 2022-02-14 NOTE — CONSULTS
"Ethics Consultation      Date: 2/14/2022     Time:  1515     Attending physician: Marian Ledesma     Consult requested by: Dr. Ana Laura Paz     Reason for consult: \"Recurrent foreign body ingestions, suspicion for secondary gain. GI requests ethics consult prior to proceeding with endoscopy.\"     Participants in Consult:        Landon Temple, Deepali Richards, Damian Teresa, Royer Montgomery- ethics    Dr. Ana Laura Paz, Dr. Samia Stanton- GI     Dr. Yolanda Alonzo- psychiatry     Decisional capacity: impaired, has guardian     Advance directive:  On File      HCA: Active- Guardian      Code status: Full Code     Background: (Medical)     - 31 year old female with history of depression, anxiety, borderline personality disorder, ADD, PTSD, eating disorder, factitious disorder, and self-injurious behavior including frequent ingestion of foreign bodies.  - Admitted to inpatient psychiatry after ingesting metal wires from face masks s/p EGD- recent procedures 2/7 and 2/8.   - Noted to have numerous (over 50 in [facility]) EGDs in last 3 years  - Now with repeat ingestion of 7.5 cm plastic spoon handle on 2/13. Per GI, low risk of 7.5 cm foreign body passing to small bowel and low risk of gastric perforation at this time.      Social:       lives at [facility] in [place], since March 2021    Has [facility]      Ethical Issue:      - Appropriateness of intervention for a patient with potential factitious disorder and may be inappropriately seeking medical intervention      Ethics Analysis:      - Does this treatment have opportunity for benefit, or do the risks/burdens outweigh any of the opportunities for benefits    When weighing the risks and benefits of treatment (EGD), there is definitive benefit of removing the foreign object.  Certainly there are increased risks of repeated EGDs per GI.  Further, repeat EGDs can have an incremental increase risk, and the party giving consent should be aware of the potential " increased risks of subsequent procedures in the informed consent process.    However, there are harms of not providing medical intervention, which are far greater than the harms providing this intervention.  Although EDGs are not treating the underlying disease process directly, it does allow the opportunity for the patient to continue receiving care with the psychiatric team and participate in the therapeutic milieu.    We also recognize that the patient may be seeing certain aspects of procedures or treatments including sedation.  It is ethically defensible to limit and mitigate some of these incentives.  For example, one may be able to limit the amount or time of sedation if this does not pose risk to the patient or compromise the success of the procedure.       Recommendations:      - Proceed with EGD per MASON Montgomery DO, MA  Pronouns: he/him/his  Clinical Ethicist - [facility]     -  Department of Family Medicine and Community Health      Please contact the service if we can be of any further assistance, service pager [phone].

## 2022-02-14 NOTE — PROGRESS NOTES
Pt came and sat on her walker by the nursing desk and reported that she was having pain on her abdomen left upper quadrant. Pt described the pain as sharp and rated it at 9/10. Pt was engaging in conversation with another peer. Pt did not present to be in any distress. Pt was ambulating without any difficulty. Pt used a warm pack but stated it was not helpful. Pt was offered prn Ibuprofen but declined. Vital signs stable. Denies nausea or vomiting. On call resident aware of pt current situation. Will continue to monitor and offer prn.

## 2022-02-14 NOTE — PROGRESS NOTES
Vital signs: Temperature 97.2, pulse 84, /77, 02 sats 96% on RA.  At 0300, pt walked with her walker to the nursing station, along side with her SIO staff; complained of pain in her LUQ abdominal region, rated 9/10, and complain of nausea. Pt was assessed, no acute distress was noted, but she guarded her LUQ abdominal region with her left hand. Lung sounds clear to auscultation in bilateral lung fields. No abdominal distension noted. Bowel sounds present to auscultion in all abdominal quadrants.She denied shortness of breath.  At 0300, refused pain medication, but accepted medication for nausea. At 0300, on-call MD was updated with patient's status, ok to continue to monitor patients, keep her comfortable, and continue to update as needed to promote further evaluation.    At 0400, vital signs: Temp 97.4, pulse 82, RR 18, /75, 02 sats 96% on RA. At 0400, pt complained of pain , rated 9/10. no acute distress was noted, but she guarded her LUQ abdominal region with her left hand. Lung sounds clear to auscultation in bilateral lung fields. No abdominal distension noted. Bowel sounds present to auscultion in all abdominal quadrants.She denied shortness of breath. At 0400, pt accepted PRN Ibuprofen 600 mg for LUQ abdominal pain rated 9/10. PRN medication was effective as patient was sleeping one hour after administration.      Patient appeared to have slept for 4 hours during shift. Pt is on SIO for self-injury risk, and ingestion. Staff to continue to offer support to patient.

## 2022-02-14 NOTE — PLAN OF CARE
Information from Dr. Ledesma:  The patient does have moderate to good insight into her SIB.  She says that small metal objects are the most concerning triggers for her and that she has not had problems with things like plastic utensils or plastic markers.  She says paperclips, mickie pins, wire twist ties, and pen springs are the most dangerous for her, this is confirmed from looking at her medical record.  he has a hx of eating disorder and it is important to not place too much focus on mealtime restrictions - to reduce risk for triggering.        Safety care plan:       2:1 staff with 1 foot distance. At least 1 female staff (See SIO order.)    Staff must always observe hands.    No bathroom door    Room lights and bathroom light on 24 hours per day    Staff members must not have swallowable objects on persons (such as pens).    Patient will eat in room.     Finger foods only, remove packaging outside of room before given to patient.     Make sure all items from tray are removed from room.    Make sure meal tray cart is always locked to reduce access items that may be ingestible.    [facility] to reduce access to objects.    No packaged toiletries, packaging, or small objects within her reach. (Is on her period, may have extra sanitary pads in her room to support patient dignity.) May use her own brush.     Room sweep prior to patient entering room    Room sweep at least twice (with accountability checks).     Staff must inspect bathroom and shower looking for and removing small objects before she enters    Unit environment will be swept regularly by staff to remove any objects that could be ingested    Masks - use only the cloth masks, do not allow masks with wires or plastic    Planned time outside of room - when patient is out of room which must be planned, environment is swept, and patient room is swept when returns.     No hairbrush, no personal shampoo.     No groups with any  supplies except paper (no staples or paperclips on paper)      This plan is in effect until changed by Jessica Avalos or Johanny Whiteside.

## 2022-02-15 ENCOUNTER — ANESTHESIA EVENT (OUTPATIENT)
Dept: SURGERY | Facility: CLINIC | Age: 32
DRG: 882 | End: 2022-02-15
Payer: COMMERCIAL

## 2022-02-15 ENCOUNTER — ANESTHESIA (OUTPATIENT)
Dept: SURGERY | Facility: CLINIC | Age: 32
DRG: 882 | End: 2022-02-15
Payer: COMMERCIAL

## 2022-02-15 LAB
GLUCOSE BLDC GLUCOMTR-MCNC: 88 MG/DL (ref 70–99)
UPPER GI ENDOSCOPY: NORMAL

## 2022-02-15 PROCEDURE — 250N000009 HC RX 250

## 2022-02-15 PROCEDURE — 370N000017 HC ANESTHESIA TECHNICAL FEE, PER MIN: Performed by: INTERNAL MEDICINE

## 2022-02-15 PROCEDURE — 250N000013 HC RX MED GY IP 250 OP 250 PS 637: Performed by: STUDENT IN AN ORGANIZED HEALTH CARE EDUCATION/TRAINING PROGRAM

## 2022-02-15 PROCEDURE — 250N000025 HC SEVOFLURANE, PER MIN: Performed by: INTERNAL MEDICINE

## 2022-02-15 PROCEDURE — 0DC68ZZ EXTIRPATION OF MATTER FROM STOMACH, VIA NATURAL OR ARTIFICIAL OPENING ENDOSCOPIC: ICD-10-PCS | Performed by: INTERNAL MEDICINE

## 2022-02-15 PROCEDURE — 250N000009 HC RX 250: Performed by: INTERNAL MEDICINE

## 2022-02-15 PROCEDURE — 710N000011 HC RECOVERY PHASE 1, LEVEL 3, PER MIN: Performed by: INTERNAL MEDICINE

## 2022-02-15 PROCEDURE — 272N000001 HC OR GENERAL SUPPLY STERILE: Performed by: INTERNAL MEDICINE

## 2022-02-15 PROCEDURE — 124N000002 HC R&B MH UMMC

## 2022-02-15 PROCEDURE — 250N000011 HC RX IP 250 OP 636

## 2022-02-15 PROCEDURE — 360N000075 HC SURGERY LEVEL 2, PER MIN: Performed by: INTERNAL MEDICINE

## 2022-02-15 PROCEDURE — 999N000141 HC STATISTIC PRE-PROCEDURE NURSING ASSESSMENT: Performed by: INTERNAL MEDICINE

## 2022-02-15 PROCEDURE — 99232 SBSQ HOSP IP/OBS MODERATE 35: CPT | Mod: GC | Performed by: PSYCHIATRY & NEUROLOGY

## 2022-02-15 PROCEDURE — 258N000003 HC RX IP 258 OP 636

## 2022-02-15 RX ORDER — FLUMAZENIL 0.1 MG/ML
0.2 INJECTION, SOLUTION INTRAVENOUS
Status: ACTIVE | OUTPATIENT
Start: 2022-02-15 | End: 2022-02-16

## 2022-02-15 RX ORDER — DIPHENHYDRAMINE HYDROCHLORIDE 50 MG/ML
25 INJECTION INTRAMUSCULAR; INTRAVENOUS EVERY 6 HOURS PRN
Status: DISCONTINUED | OUTPATIENT
Start: 2022-02-15 | End: 2022-02-15 | Stop reason: HOSPADM

## 2022-02-15 RX ORDER — NALOXONE HYDROCHLORIDE 0.4 MG/ML
0.2 INJECTION, SOLUTION INTRAMUSCULAR; INTRAVENOUS; SUBCUTANEOUS
Status: DISCONTINUED | OUTPATIENT
Start: 2022-02-15 | End: 2022-02-17 | Stop reason: HOSPADM

## 2022-02-15 RX ORDER — ONDANSETRON 4 MG/1
4 TABLET, ORALLY DISINTEGRATING ORAL EVERY 30 MIN PRN
Status: DISCONTINUED | OUTPATIENT
Start: 2022-02-15 | End: 2022-02-15 | Stop reason: HOSPADM

## 2022-02-15 RX ORDER — HYDROMORPHONE HCL IN WATER/PF 6 MG/30 ML
0.2 PATIENT CONTROLLED ANALGESIA SYRINGE INTRAVENOUS EVERY 5 MIN PRN
Status: DISCONTINUED | OUTPATIENT
Start: 2022-02-15 | End: 2022-02-15 | Stop reason: CLARIF

## 2022-02-15 RX ORDER — FENTANYL CITRATE 50 UG/ML
25 INJECTION, SOLUTION INTRAMUSCULAR; INTRAVENOUS
Status: DISCONTINUED | OUTPATIENT
Start: 2022-02-15 | End: 2022-02-15

## 2022-02-15 RX ORDER — SODIUM CHLORIDE, SODIUM LACTATE, POTASSIUM CHLORIDE, CALCIUM CHLORIDE 600; 310; 30; 20 MG/100ML; MG/100ML; MG/100ML; MG/100ML
INJECTION, SOLUTION INTRAVENOUS CONTINUOUS PRN
Status: DISCONTINUED | OUTPATIENT
Start: 2022-02-15 | End: 2022-02-15

## 2022-02-15 RX ORDER — ONDANSETRON 2 MG/ML
4 INJECTION INTRAMUSCULAR; INTRAVENOUS EVERY 30 MIN PRN
Status: DISCONTINUED | OUTPATIENT
Start: 2022-02-15 | End: 2022-02-15 | Stop reason: HOSPADM

## 2022-02-15 RX ORDER — DIAZEPAM 10 MG/2ML
2.5 INJECTION, SOLUTION INTRAMUSCULAR; INTRAVENOUS
Status: DISCONTINUED | OUTPATIENT
Start: 2022-02-15 | End: 2022-02-15

## 2022-02-15 RX ORDER — HYDROMORPHONE HCL IN WATER/PF 6 MG/30 ML
0.2 PATIENT CONTROLLED ANALGESIA SYRINGE INTRAVENOUS EVERY 5 MIN PRN
Status: DISCONTINUED | OUTPATIENT
Start: 2022-02-15 | End: 2022-02-15

## 2022-02-15 RX ORDER — FENTANYL CITRATE 50 UG/ML
INJECTION, SOLUTION INTRAMUSCULAR; INTRAVENOUS PRN
Status: DISCONTINUED | OUTPATIENT
Start: 2022-02-15 | End: 2022-02-15

## 2022-02-15 RX ORDER — PROPOFOL 10 MG/ML
INJECTION, EMULSION INTRAVENOUS PRN
Status: DISCONTINUED | OUTPATIENT
Start: 2022-02-15 | End: 2022-02-15

## 2022-02-15 RX ORDER — NALOXONE HYDROCHLORIDE 0.4 MG/ML
0.4 INJECTION, SOLUTION INTRAMUSCULAR; INTRAVENOUS; SUBCUTANEOUS
Status: DISCONTINUED | OUTPATIENT
Start: 2022-02-15 | End: 2022-02-17 | Stop reason: HOSPADM

## 2022-02-15 RX ORDER — FENTANYL CITRATE 50 UG/ML
25 INJECTION, SOLUTION INTRAMUSCULAR; INTRAVENOUS EVERY 5 MIN PRN
Status: DISCONTINUED | OUTPATIENT
Start: 2022-02-15 | End: 2022-02-15 | Stop reason: CLARIF

## 2022-02-15 RX ORDER — OXYCODONE HYDROCHLORIDE 5 MG/1
5 TABLET ORAL EVERY 4 HOURS PRN
Status: DISCONTINUED | OUTPATIENT
Start: 2022-02-15 | End: 2022-02-15

## 2022-02-15 RX ORDER — KETOROLAC TROMETHAMINE 30 MG/ML
30 INJECTION, SOLUTION INTRAMUSCULAR; INTRAVENOUS ONCE
Status: COMPLETED | OUTPATIENT
Start: 2022-02-15 | End: 2022-02-15

## 2022-02-15 RX ORDER — LABETALOL HYDROCHLORIDE 5 MG/ML
10 INJECTION, SOLUTION INTRAVENOUS
Status: DISCONTINUED | OUTPATIENT
Start: 2022-02-15 | End: 2022-02-15 | Stop reason: HOSPADM

## 2022-02-15 RX ORDER — SODIUM CHLORIDE, SODIUM LACTATE, POTASSIUM CHLORIDE, CALCIUM CHLORIDE 600; 310; 30; 20 MG/100ML; MG/100ML; MG/100ML; MG/100ML
INJECTION, SOLUTION INTRAVENOUS CONTINUOUS
Status: DISCONTINUED | OUTPATIENT
Start: 2022-02-15 | End: 2022-02-15 | Stop reason: HOSPADM

## 2022-02-15 RX ORDER — HYDRALAZINE HYDROCHLORIDE 20 MG/ML
2.5-5 INJECTION INTRAMUSCULAR; INTRAVENOUS EVERY 10 MIN PRN
Status: DISCONTINUED | OUTPATIENT
Start: 2022-02-15 | End: 2022-02-15 | Stop reason: HOSPADM

## 2022-02-15 RX ORDER — FENTANYL CITRATE 50 UG/ML
25 INJECTION, SOLUTION INTRAMUSCULAR; INTRAVENOUS EVERY 5 MIN PRN
Status: DISCONTINUED | OUTPATIENT
Start: 2022-02-15 | End: 2022-02-15

## 2022-02-15 RX ORDER — DIPHENHYDRAMINE HCL 25 MG
25 CAPSULE ORAL EVERY 6 HOURS PRN
Status: DISCONTINUED | OUTPATIENT
Start: 2022-02-15 | End: 2022-02-15 | Stop reason: HOSPADM

## 2022-02-15 RX ORDER — HALOPERIDOL 5 MG/ML
1 INJECTION INTRAMUSCULAR
Status: DISCONTINUED | OUTPATIENT
Start: 2022-02-15 | End: 2022-02-15

## 2022-02-15 RX ORDER — IBUPROFEN 400 MG/1
400 TABLET, FILM COATED ORAL EVERY 6 HOURS PRN
Status: DISCONTINUED | OUTPATIENT
Start: 2022-02-15 | End: 2022-02-17 | Stop reason: HOSPADM

## 2022-02-15 RX ORDER — METFORMIN HCL 500 MG
1000 TABLET, EXTENDED RELEASE 24 HR ORAL
Status: DISCONTINUED | OUTPATIENT
Start: 2022-02-15 | End: 2022-02-17 | Stop reason: HOSPADM

## 2022-02-15 RX ORDER — MEPERIDINE HYDROCHLORIDE 25 MG/ML
12.5 INJECTION INTRAMUSCULAR; INTRAVENOUS; SUBCUTANEOUS
Status: DISCONTINUED | OUTPATIENT
Start: 2022-02-15 | End: 2022-02-15

## 2022-02-15 RX ORDER — ONDANSETRON 2 MG/ML
INJECTION INTRAMUSCULAR; INTRAVENOUS PRN
Status: DISCONTINUED | OUTPATIENT
Start: 2022-02-15 | End: 2022-02-15

## 2022-02-15 RX ORDER — ALBUTEROL SULFATE 0.83 MG/ML
2.5 SOLUTION RESPIRATORY (INHALATION) EVERY 4 HOURS PRN
Status: DISCONTINUED | OUTPATIENT
Start: 2022-02-15 | End: 2022-02-15 | Stop reason: HOSPADM

## 2022-02-15 RX ORDER — LIDOCAINE HYDROCHLORIDE 20 MG/ML
INJECTION, SOLUTION INFILTRATION; PERINEURAL PRN
Status: DISCONTINUED | OUTPATIENT
Start: 2022-02-15 | End: 2022-02-15

## 2022-02-15 RX ADMIN — ONDANSETRON 4 MG: 2 INJECTION INTRAMUSCULAR; INTRAVENOUS at 13:06

## 2022-02-15 RX ADMIN — CETIRIZINE HYDROCHLORIDE 10 MG: 10 TABLET, FILM COATED ORAL at 19:49

## 2022-02-15 RX ADMIN — BUSPIRONE HYDROCHLORIDE 20 MG: 10 TABLET ORAL at 07:38

## 2022-02-15 RX ADMIN — PREGABALIN 100 MG: 100 CAPSULE ORAL at 07:38

## 2022-02-15 RX ADMIN — FENTANYL CITRATE 50 MCG: 50 INJECTION, SOLUTION INTRAMUSCULAR; INTRAVENOUS at 13:41

## 2022-02-15 RX ADMIN — PROPRANOLOL HYDROCHLORIDE 80 MG: 80 CAPSULE, EXTENDED RELEASE ORAL at 07:37

## 2022-02-15 RX ADMIN — Medication 100 MG: at 12:55

## 2022-02-15 RX ADMIN — LIDOCAINE HYDROCHLORIDE 100 MG: 20 INJECTION, SOLUTION INFILTRATION; PERINEURAL at 12:52

## 2022-02-15 RX ADMIN — HYDROXYCHLOROQUINE SULFATE 200 MG: 200 TABLET, FILM COATED ORAL at 07:37

## 2022-02-15 RX ADMIN — MELATONIN TAB 3 MG 3 MG: 3 TAB at 00:29

## 2022-02-15 RX ADMIN — FENTANYL CITRATE 100 MCG: 50 INJECTION, SOLUTION INTRAMUSCULAR; INTRAVENOUS at 12:51

## 2022-02-15 RX ADMIN — FERROUS SULFATE TAB 325 MG (65 MG ELEMENTAL FE) 325 MG: 325 (65 FE) TAB at 07:38

## 2022-02-15 RX ADMIN — PREGABALIN 100 MG: 100 CAPSULE ORAL at 19:49

## 2022-02-15 RX ADMIN — IBUPROFEN 400 MG: 400 TABLET ORAL at 04:45

## 2022-02-15 RX ADMIN — HYDROXYCHLOROQUINE SULFATE 200 MG: 200 TABLET, FILM COATED ORAL at 19:49

## 2022-02-15 RX ADMIN — Medication 2000 UNITS: at 07:38

## 2022-02-15 RX ADMIN — MELATONIN TAB 3 MG 3 MG: 3 TAB at 22:25

## 2022-02-15 RX ADMIN — BUSPIRONE HYDROCHLORIDE 20 MG: 10 TABLET ORAL at 19:49

## 2022-02-15 RX ADMIN — KETOROLAC TROMETHAMINE 30 MG: 30 INJECTION, SOLUTION INTRAMUSCULAR; INTRAVENOUS at 14:29

## 2022-02-15 RX ADMIN — PROPOFOL 140 MG: 10 INJECTION, EMULSION INTRAVENOUS at 12:54

## 2022-02-15 RX ADMIN — DESVENLAFAXINE SUCCINATE 100 MG: 100 TABLET, EXTENDED RELEASE ORAL at 07:38

## 2022-02-15 RX ADMIN — LURASIDONE HYDROCHLORIDE 40 MG: 40 TABLET, FILM COATED ORAL at 19:49

## 2022-02-15 RX ADMIN — SODIUM CHLORIDE, POTASSIUM CHLORIDE, SODIUM LACTATE AND CALCIUM CHLORIDE: 600; 310; 30; 20 INJECTION, SOLUTION INTRAVENOUS at 12:45

## 2022-02-15 RX ADMIN — METFORMIN HYDROCHLORIDE 1000 MG: 500 TABLET, EXTENDED RELEASE ORAL at 17:53

## 2022-02-15 ASSESSMENT — ACTIVITIES OF DAILY LIVING (ADL)
ORAL_HYGIENE: INDEPENDENT
DRESS: INDEPENDENT
ORAL_HYGIENE: INDEPENDENT
HYGIENE/GROOMING: INDEPENDENT
LAUNDRY: WITH SUPERVISION
DRESS: INDEPENDENT
LAUNDRY: WITH SUPERVISION
HYGIENE/GROOMING: INDEPENDENT

## 2022-02-15 ASSESSMENT — MIFFLIN-ST. JEOR: SCORE: 1936.25

## 2022-02-15 ASSESSMENT — ENCOUNTER SYMPTOMS: SEIZURES: 0

## 2022-02-15 NOTE — ANESTHESIA POSTPROCEDURE EVALUATION
Patient: Nevin Alvarado    Procedure: Procedure(s):  UPPER ESOPHAGOGASTRODUODENOSCOPY, WITH FOREIGN BODY REMOVAL AND USE OF BLANKENSHIP       Diagnosis:Swallowed foreign body, subsequent encounter [T18.9XXD]  Diagnosis Additional Information: No value filed.    Anesthesia Type:  General    Note:  Disposition: Outpatient   Postop Pain Control: Uneventful            Sign Out: Well controlled pain   PONV: No   Neuro/Psych: Uneventful            Sign Out: Acceptable/Baseline neuro status   Airway/Respiratory: Uneventful            Sign Out: Acceptable/Baseline resp. status   CV/Hemodynamics: Uneventful            Sign Out: Acceptable CV status; No obvious hypovolemia; No obvious fluid overload   Other NRE: NONE   DID A NON-ROUTINE EVENT OCCUR? No    Event details/Postop Comments:  Hemodynamically stable, denies N/V, well controlled pain.             Last vitals:  Vitals Value Taken Time   /75 02/15/22 1415   Temp     Pulse 75 02/15/22 1430   Resp     SpO2 94 % 02/15/22 1430   Vitals shown include unvalidated device data.    Electronically Signed By: Asher Corado MD  February 15, 2022  2:31 PM

## 2022-02-15 NOTE — PLAN OF CARE
Pt was out in the lounge watching tv most of the time this evening. Pt presents with flat affect. Pt engages in conversations upon approach but others meeks kept to herself. Pt reports tenderness abdomen on left upper quadrant rating at 6/10. Pt was constantly asking when she would be taken to have the foreign object swallowed on 2/12/22 removed. The resident psychiatrist came up and spoke to patient about the plans and pt was much calm after that. Pt denied suicidal thoughts. Denies AH/VH. Pt took all her medications at . No further concerns. Vital signs are stable.    Problem: Adult Inpatient Plan of Care  Goal: Optimal Comfort and Wellbeing  Outcome: No Change  Goal: Readiness for Transition of Care  Outcome: No Change     Problem: Behavioral Health Plan of Care  Goal: Adheres to Safety Considerations for Self and Others  Outcome: No Change     Problem: Suicidal Behavior  Goal: Suicidal Behavior is Absent or Managed  Outcome: Improving
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
## 2022-02-15 NOTE — PROGRESS NOTES
"    ----------------------------------------------------------------------------------------------------------  [facility], [place]   Psychiatric Progress Note  Hospital Day #5    Identifier: Nevin Alvarado is a 31 year old female with previous psychiatric diagnoses of MDD, PTSD, borderline personality disorder, CANDICE, factitious disorder who presents with recent self-injurious behavior (ingestion). Admission was voluntary. Given her extensive, previous history of self-injurious ingestions, assessment is that the current presentation is consistent with self-injurious ingestion related to previous diagnosis of BPD.     Interim History:   The patient's care was discussed with the treatment team and chart notes were reviewed.    Vitals: VSS  Sleep: 5.75 hours (02/15/22 0600)  Scheduled medications: Took all scheduled psychiatric medications as prescribed  PRN medications:   -Melatonin  -Ibuprofen x2  -Zofran  -Propanolol for anxiety with good effect    Staff Report:   Per chart review, pt complained of nausea which was resolved with Zofran. Pt also still endorses 9/10 LUQ abd pain which was mildly resolved with use of recliner. Upon room check, writer found 1 inch straw segment under bed. Pt reported to be tearful and that the SIB was not \"anything to end her life\" but was initially trying to get attention. Now feels like an \"addicition.\" Pt given PRN melatonin and ibuprofen before bed.    Update: on further inspection, item under mattress appears to be inside of pen. Patient has history of ingesting pen springs.     Subjective:     Patient Interview:  Nevin Alvarado was interviewed in bedroom. Discussed with pt that her EGD is scheduled for this morning at [place]. Pt reports feeling a bit anxious about procedure but overall good that she is getting object removed.     When asked about the 1 inch straw piece found in her bed yesterday, she denied that it was from her and that " "she was \"just was as surprised as everyone else.\" Denies knowing it was there and putting it there in the first place.    Pt asked if she could have blanket over vent due to it blowing only cold air. Also asked if it would be okay if she did not have to be \"strip searched\" by staff every time she leaves the room.     Phone call with Luke ([facility]), guardian:  Guardian again consents to EGD under general anesthesia and related cares. Also discussed risk of propanolol interfering with albuterol for asthma rescue, and he consents to continuing propanolol.    The risks, benefits, alternatives and side effects of any medication changes have been discussed and are understood by the guardian.    Review of systems:     Patient denies acute concerns    Objective:   /79 (BP Location: Left arm, Patient Position: Sitting)   Pulse 80   Temp 97.1  F (36.2  C) (Temporal)   Resp 20   Wt 132.1 kg (291 lb 3.2 oz)   LMP 12/02/2021 (Approximate)   SpO2 95%   BMI 53.26 kg/m    Weight is 291 lbs 3.2 oz  Body mass index is 53.26 kg/m .    Mental Status Exam:  Oriented to:  Grossly Oriented  General:  Awake and Alert. Appears comfortable sitting up in bed.  Appearance:  appears stated age  Behavior/Attitude:  calm and cooperative  Eye Contact:  appropriate  Psychomotor: normal no catatonia present  Speech:  appropriate volume/tone  Language: Fluent in English with appropriate syntax and vocabulary.  Mood:  \"good but anxious\"  Affect: Appears euthymic. Congruent with mood.  Thought Process:  linear, logical, goal-oriented  Thought Content:  No SI/HI/AH/VH; No apparent delusions  Associations:  intact  Insight:  good   Judgment:  poor  Impulse control: poor  Attention Span:  grossly intact  Concentration:  grossly intact  Recent and Remote Memory:  grossly intact  Fund of Knowledge:  average  Muscle Strength and Tone: normal  Gait and Station: Normal        Allergies:     Allergies   Allergen Reactions     " Amoxicillin-Pot Clavulanate Other (See Comments), Swelling and Rash     PN: facial swelling, left side. Also had cortisone injection the same day as she started the Augmentin.  Noted in downtime recovery of chart.    PN: facial swelling, left side. Also had cortisone injection the same day as she started the Augmentin.; HUT Comment: PN: facial swelling, left side. Also had cortisone injection the same day as she started the Augmentin.Noted in downtime recovery of chart.; HUT Reaction: Rash; HUT Reaction: Unknown; HUT Reaction Type: Allergy; HUT Severity: Med; HUT Noted: [id number]     Oseltamivir Hives     med stopped, PN: med stopped  med stopped, PN: med stopped; HUT Comment: med stopped, PN: med stopped; HUT Reaction: Hives; HUT Reaction Type: Allergy; HUT Severity: Med; HUT Noted: [id number]     Penicillins Anaphylaxis     HUT Reaction: Anaphylaxis; HUT Reaction Type: Allergy; HUT Severity: High; HUT Noted: [id number]     Vancomycin Itching, Swelling and Rash     Other reaction(s): Redness  Flushed face, very itchy; HUT Comment: Flushed face, very itchy; HUT Reaction: Angioedema; HUT Reaction: Redness; HUT Severity: Med; HUT Noted: [id number]    facial     Hydrocodone Nausea and Vomiting and GI Disturbance     vomiting for days, PN: vomiting for days; HUT Comment: vomiting for days; HUT Reaction: Gastrointestinal; HUT Reaction: Nausea And Vomiting; HUT Reaction Type: Intolerance; HUT Severity: Med; HUT Noted: [id number]  vomiting for days       Acetaminophen Hives     Blood-Group Specific Substance Other (See Comments)     Patient has an anti-Cw and non-specific antibodies. Blood product orders may be delayed. Draw one red top and two purple top tubes for all type/screen/crossmatch orders.  Patient has anti-Cw, anti-K ([place]), Warm auto and nonspecific antibodies. Blood products may be delayed. Draw patient 24 hours prior to transfusion. Draw one red top and two purple top tubes for all type and screen orders.      Clavulanic Acid Angioedema     Naltrexone Other (See Comments)     Reaction(s): Vivid dreams.  Reaction(s): Vivid dreams.       Oxycodone Swelling     Adhesive Tape Rash     Silicone type  Silicone type     Cephalosporins Rash     Lamotrigine Rash     Possibly associated with Lamictal.   HUT Comment: Possibly associated with Lamictal. ; HUT Reaction: Rash; HUT Reaction Type: Allergy; HUT Severity: Low; HUT Noted: [id number]     Latex Rash     HUT Reaction: Rash; HUT Reaction Type: Allergy; HUT Severity: Low; HUT Noted: [id number]        Labs:     Recent Results (from the past 24 hour(s))   Asymptomatic COVID-19 Virus (Coronavirus) by PCR Nasopharyngeal    Collection Time: 02/14/22  2:42 PM    Specimen: Nasopharyngeal; Swab   Result Value Ref Range    SARS CoV2 PCR Negative Negative          Assessment    Primary psychiatric diagnosis:   Factitious disorder  Borderline personality disorder    Secondary psychiatric diagnoses of concern this admission:   CANDICE  MDD  Complex PTSD severe, recurrent    Diagnostic Impression:  Nevin Alvarado is a 31 year old  female with a past psychiatric history of MDD, PTSD, Borderline Personality Disorder, factitious disorder, and long history of swallowing inedible objects. Patient self-presented to the ER on 02/10/2022 after ingesting 2 wires from a surgical mask in the context of several other recent ingestions. Patient reports medication adherence. She is currently followed by Psychiatrist Kiersten Johnson, [facility] and PCP  Latonya Knight M.D at [facility]. Patient's support system includes family, county and outpatient team.  Substance use does not appear to be playing a contributing role in the patient's presentation.  There is genetic loading for mood and anxiety. Medical history does appear to be significant for obesity, chronic pain, autoimmune disorder.  Psychologically hx of trauma leading to chronic PTSD with maladaptive coping mechanism and SIB are a pivotal  contributing factor to presentation. Socially, difficulty with interpersonal relationships and maintaining a job are factors affecting patient's ability to thrive in society. The MSE on admission was notable for low mood, anxious/exaggerated affect, pt being hypertalkative, tearful and feeling ashamed; no evidence of suicidal ideation or homicidal ideation, no evidence of psychotic thought, thoughts of self-harm, which are increased and no visual hallucinations present, no elicited delusions and impaired judgement. The ingestion of a spoon handle on 2/12 was further emblematic of poor coping skills, poor distress tolerance, and impulsivity. Her reported symptoms of SIB, mood lability, poor frustration tolerance and impulsivity, and difficulty with interpersonal relationships are consistent with her historic diagnosis of underlying borderline personality disorder and complex PTSD.      Psychiatric Hospital course:   Nevin Alvarado was admitted to [facility] as a voluntary patient. PTA meds were continued. New medications started at the time of admission include scheduled propanolol XR for both tremor and anxiety. This was approved by her legal guardian. On 2/12, patient ingested the handle of a spoon following a triggering conversation with peer. Patient did not describe motivation for event outside of the triggering conversation.     Medical course:   Patient was medically cleared for admission to psychiatric unit. S/p EGD by GI service to remove metal wires in ED. PTA medications were resumed. Pt ingested 7.5 cm plastic spoon handle on 2/12/22. Medicine consulted and 7.5cm foreign body was confirmed via CT abdomen. GI was consulted, EGD not pursued emergently due to patient stability, low risk of perforation, entry into small bowel, and concerns regarding negative reinforcement for ingestion behavior. Ethics consulted by GI and recommended proceeding with EGD. EGD completed under general anesthesia  "2/15.    Data:   UDS negative  TSH 4.94, T4  0.87  Lipids: triglycerides 266, otherwise WNL  CBC: WBC 14.9, otherwise WNL  Folate, Vit B12, Vit D,  normal  Amylase normal  BMP normal  Covid-19 negative  UPT negative    Plan     Today's changes:   - EGD at [place] today under general anesthesia     Psychotropic Medications:  Scheduled:  Buspirone 20 mg TID  Desvenlafaxine 100 mg daily  Lurasidone 40 mg with supper  Pregabalin 100 mg TID\  Propanolol SA 80mg daily     PRN:  -Maalox 30 ml Q4H PRN for indigestion  -Hydroxyzine 25-50 mg Q6H PRN for anxiety  -Melatonin 3mg at bedtime  -Miralax prn for constipation  -NRT  -Olanzapine 10 mg PO/IM prn Q2H severe agitation/psychosis  -Trazodone 50 mg PRN at bedtime  -Propanolol 10 mg prn daily   -Ibuprofen 600mg q6h prn for menstrual cramps  -Psyllium daily prn    Additional Plans:  Patient will be treated in therapeutic milieu with appropriate individual and group therapies as described.    Medical diagnoses to be addressed this admission:    #Foreign body ingestion   Pt ingested 7.5 cm plastic spoon handle on 2/12/22. Medicine consulted and 7.5cm foreign body was confirmed via CT abdomen. GI was consulted, EGD not pursued emergently due to patient stability, low risk of perforation, entry into small bowel, and concerns regarding negative reinforcement for ingestion behavior. Ethics consulted by GI, and recommended proceeding with EGD. EGD tentatively scheduled for 2/15.  -Resume regular diet per GI    #Intention Tremor  Unclear etiology. No family history of essential tremor. No resting tremor or symptoms of parkinsonism.   -Propanolol SA 80mg daily    #DM vs prediabetes  Blood glucose 2/8/22 108.  -PTA metformin XR 1000mg daily    #Subclinical hypothyroidism  TSH 4.94, T4  0.87. Asymptomatic.     Consults:   --Medicine/GI for foreign body ingestion (2/13/22):   \"CT shows 7.5 cm foreign body in stomach. Case discussed with GI team. Foreign body unlikely to pass out of " "stomach given size. GI team does not feel that transfer to inpatient medicine is necessary at this time given lower risk of gastric perforation. No indication for urgent endoscopy at this time. Given history of recurrent foreign body ingestion possibly for secondary gain, GI recommends ethics consult prior to proceeding as repeat procedures may continue to encourage future behaviors.   - Continue NPO status for now  - Will reassess EGD plan and diet in AM  - Contact medicine for any change in status, particularly if febrile, vital changes, or worsening pain with obvious evidence of distress\"    Update following conversation 2/14 PM: Will proceed with EGD following their conversation with ethics committee. Patient tentatively scheduled for EGD on [place] 2/15. Will need general anesthesia due to risk of injury to lung given nature of ingested object. Patient may drink water only until midnight, then strict NPO at midnight.    Update 2/15: EGD findings: healed esophageal deformity, linear, green, plastic density consistent with spoon handle in body of stomach successfully removed, scattered superficial mucosal injury in stomach near foreign object. Recommend regular diet. Signed off.    --Full Ethics Consult 2/14  Recommend proceeding with EGD. See note 2/14 for further details.    Legal Status: Voluntary with legal guardian    Safety Assessment:   Behavioral Orders   Procedures     Code 1 - Restrict to Unit     Code 2     For xray     Routine Programming     As clinically indicated     Self Injury Precaution     Status 15     Every 15 minutes.     Status Individual Observation     2:1 observation. Patient SIO status reviewed with team/RN.  Please also refer to RN/team documentation for add'l details - detailed plan in documentation.     Order Specific Question:   CONTINUOUS 24 hours / day     Answer:   1 foot     Order Specific Question:   Indications for SIO     Answer:   Self-injury risk     Suicide precautions "     Patients on Suicide Precautions should have a Combination Diet ordered that includes a Diet selection(s) AND a Behavioral Tray selection for Safe Tray - with utensils, or Safe Tray - NO utensils     Suicide precautions     Patients on Suicide Precautions should have a Combination Diet ordered that includes a Diet selection(s) AND a Behavioral Tray selection for Safe Tray - with utensils, or Safe Tray - NO utensils         SIO: Yes, 2:1    Information from Dr. Ledesma:    The patient does have moderate to good insight into her SIB.  She says that small metal objects are the most concerning triggers for her and that she has not had problems with things like plastic utensils or plastic markers.  She says paperclips, mickie pins, wire twist ties, and pen springs are the most dangerous for her, this is confirmed from looking at her medical record.  he has a hx of eating disorder and it is important to not place too much focus on mealtime restrictions - to reduce risk for triggering.        Safety care plan:              2:1 staff with 1 foot distance. At least 1 female staff (See SIO order.)              Staff must always observe hands.              No bathroom door              Room lights and bathroom light on 24 hours per day              Staff members must not have swallowable objects on persons (such as pens).              Patient will eat in room.              Finger foods only, remove packaging outside of room before given to patient.              Make sure all items from tray are removed from room.              Make sure meal tray cart is always locked to reduce access items that may be ingestible.   Patient's room is away from [facility] area to reduce access to objects.              No packaged toiletries, packaging, or small objects within her reach. (Is on her period, may have extra sanitary pads in her room to support patient dignity.) May use her own brush.              Room sweep prior to patient entering  room              Room sweep at least twice (with accountability checks).              Staff must inspect bathroom and shower looking for and removing small objects before she enters              Unit environment will be swept regularly by staff to remove any objects that could be ingested              Masks - use only the cloth masks, do not allow masks with wires or plastic              Planned time outside of room - when patient is out of room which must be planned, environment is swept, and patient room is swept when returns.              No hairbrush, no personal shampoo.              No groups with any supplies except paper (no staples or paperclips on paper)    This plan is in effect until changed by Jessica Avalos or Johanny Whiteside.     Disposition: Pending stabilization, medication optimization, & development of a safe discharge plan.    Attestation:   I was present with the medical student Rafita Pyle MS3 who participated in the service and in the documentation of the note.  I have verified the history and personally performed the physical exam and medical decision making. I agree with the assessment and plan of care as documented in the note.    This patient was seen and discussed with my attending physician.  Yolanda Alonzo MD  PGY-1 Psychiatry Resident Physician    Attestation:  This patient has been seen and evaluated by me, Marian Ledesma MD.  I have discussed this patient with the house staff team including the resident and medical student and I agree with the findings and plan in this note.    I have reviewed today's vital signs, medications, labs and imaging. Marian Ledesma MD , PhD.

## 2022-02-15 NOTE — OR NURSING
Cared for patient in immediate postop and facilitated transport/transfer back to [facility] on [facility] w CARMEN Narvaez. Patient continued to have 2:1 psych attendants Shoshana and Libia, now Shivani and Norma throughout PACU stay and they will accompany patient on EMS ride back.    Of note, there is no clear surgical plan laid out in ethics, psych, or gastroenterology to communicate post surgical medication/pain medication plan in patient who has history of repeated self injurious behaviors requiring surgical intervention. Paged GI team and called patient's inpatient nurse on [facility] to discuss, plus discussed with Dr Asher Corado [facility] - plan to offer 1x dose of iv toradol as patient c/o postop abd discomfort and discharge all narcotic medications as this surgical procedure has very low likelihood to necessitate narcotic pain medications and this may aid in reducing secondary benefit of surgery for Nevin.    Please see flowsheets, MAR, and results review for further details

## 2022-02-15 NOTE — PLAN OF CARE
Problem: Sleep Disturbance (Anxiety Signs/Symptoms)  Goal: Improved Sleep (Anxiety Signs/Symptoms)  Outcome: Improving     Vital signs: Temperature 97.4, pulse 72, /87, 02 sats 94 % on RA.  At 0029, melatonin 3 mg was given to promote sleep per pt request, which was effective, as pt was sleeping almost immediately after administration. At 0029, pt complained of pain in her LUQ abdominal region, rated 8/10, but she immediately went back to sleep before medication intervention was initiated. At 0445, Ibuprofen 400 mg was given for complain of pain in her LUQ abdominal region, rated 8/10. PRN pain medication was effective, as pt was sleeping one hour after administration. Throughout the shift, Pt was assessed, no acute distress was noted, but she guarded her LUQ abdominal region with her left hand. Lung sounds clear to auscultation in bilateral lung fields. No abdominal distension noted. Bowel sounds present to auscultion in all abdominal quadrants.She denied shortness of breath, denied nausea, denied SI/SIB, and contracted for safety. Pt still remain on SIO= 2:1 for self injury risk, and ingestion.      Pt. appeared to have slept for 5.75 hours. At 0625,Pt is in bed  sleeping with SIO 2:1 keeping close watch of her to promote safety. Staff to continue to offer support to pt.

## 2022-02-15 NOTE — ANESTHESIA PROCEDURE NOTES
Airway       Patient location during procedure: OR       Procedure Start/Stop Times: 2/15/2022 12:56 PM  Staff -        Anesthesiologist:  Bruce Fox MD       Resident/Fellow: Nadir Baez MD       Performed By: resident  Consent for Airway        Urgency: elective  Indications and Patient Condition       Indications for airway management: isma-procedural       Induction type:RSI       Mask difficulty assessment: 0 - not attempted    Final Airway Details       Final airway type: endotracheal airway       Successful airway: ETT - single  Endotracheal Airway Details        ETT size (mm): 7.0       Cuffed: yes       Successful intubation technique: video laryngoscopy       VL Blade Size: MAC 3       Grade View of Cords: 2       Adjucts: stylet       Position: Right       Measured from: gums/teeth       Bite Block used: EGD biteblock.    Post intubation assessment        Placement verified by: capnometry, equal breath sounds and chest rise        Number of attempts at approach: 1       Number of other approaches attempted: 0       Secured with: pink tape       Ease of procedure: easy       Dentition: Intact    Additional Comments       Routine intubation.

## 2022-02-15 NOTE — ANESTHESIA CARE TRANSFER NOTE
Patient: Nevin Alvarado    Procedure: Procedure(s):  UPPER ESOPHAGOGASTRODUODENOSCOPY, WITH FOREIGN BODY REMOVAL AND USE OF BLANKENSHIP       Diagnosis: Swallowed foreign body, subsequent encounter [T18.9XXD]  Diagnosis Additional Information: No value filed.    Anesthesia Type:   General     Note:    Oropharynx: oropharynx clear of all foreign objects and spontaneously breathing  Level of Consciousness: drowsy  Oxygen Supplementation: nasal cannula  Level of Supplemental Oxygen (L/min / FiO2): 2  Independent Airway: airway patency satisfactory and stable  Dentition: dentition unchanged  Vital Signs Stable: post-procedure vital signs reviewed and stable  Report to RN Given: handoff report given  Patient transferred to: PACU    Handoff Report: Identifed the Patient, Identified the Reponsible Provider, Reviewed the pertinent medical history, Discussed the surgical course, Reviewed Intra-OP anesthesia mangement and issues during anesthesia, Set expectations for post-procedure period and Allowed opportunity for questions and acknowledgement of understanding      Vitals:  Vitals Value Taken Time   /84 02/15/22 1346   Temp     Pulse 77 02/15/22 1354   Resp     SpO2 92 % 02/15/22 1354   Vitals shown include unvalidated device data.    Electronically Signed By: Nadir Baez MD  February 15, 2022  1:55 PM

## 2022-02-15 NOTE — PLAN OF CARE
Problem: Suicidal Behavior  Goal: Suicidal Behavior is Absent or Managed  Outcome: Improving     Problem: Mood Impairment (Anxiety Signs/Symptoms)  Goal: Improved Mood Symptoms (Anxiety Signs/Symptoms)  2/15/2022 1338 by Brice Vieyra, RN  Outcome: Improving  2/15/2022 1337 by Brice Vieyra, RN  Outcome: Improving     Patient denied suicidal and homicidal ideation and contracted for safety. She denied visual and auditory hallucinations and did not seem to be responding to internal stimuli. She was pleasant during psychosocial assessment but complained of having been NPO for a prolonged period of time. She left for EGD procedure at the [facility] at 1130. 2 SIO staff took the shuttle to meet her and EMS at the [facility].

## 2022-02-15 NOTE — PLAN OF CARE
Assessment/Intervention/Current Symptoms and Care Coordination: Met with team. Reviewed patient's chart and progress notes. Writer received a call from patient's therapist, Jacklyn, stating that she will contact patient tomorrow at 11:00 AM. Writer received a call from MedicErna michaels CM. CM stated that it will be beneficial for patient to keep this hospitalization short. Writer provided update on patient's most recent ingestion incident and the need to be hospitalized at this time. CM also recommended that patient may need Neuropsychiatry evaluation after discharged.         Discharge Plan or Goal: Will discharged back to group home. Will follow up with outpatient treatment team.         Barriers to Discharge: Engaged in SIB by ingestion. Needs clinical stabilization and medication management.            Referral Status:           Legal Status: Has Legal Guardian

## 2022-02-15 NOTE — BRIEF OP NOTE
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, healed   -- Linear, green, plastic density consistent with spoon handle in body of  Stomach, successfully removed with snare and blankenship   -- Scattered superficial mucosal injury in stomach near foreign object   -- Scatter nodular mucosa in stomach   -- No evidence of recent or active bleeding      Complications: None    Implants: None    Outgoing Recommendations:  -- Recovery in PACU, then transfer back to [facility] in [facility]  -- ADAT, regular diet ok  -- please limit patient's access to ingestible foreign bodies  -- luminal GI will sign off, please do not hesitate to reach out with questions or concerns    Mario Aguilar MD, PGY4  Gastroenterology Fellow  [facility]  See [facility] for GI on-call information    Dr. Stanton present for entire procedure, she is in agreement with the above

## 2022-02-16 PROCEDURE — 124N000002 HC R&B MH UMMC

## 2022-02-16 PROCEDURE — 99232 SBSQ HOSP IP/OBS MODERATE 35: CPT | Mod: GC | Performed by: PSYCHIATRY & NEUROLOGY

## 2022-02-16 PROCEDURE — 250N000013 HC RX MED GY IP 250 OP 250 PS 637: Performed by: STUDENT IN AN ORGANIZED HEALTH CARE EDUCATION/TRAINING PROGRAM

## 2022-02-16 RX ORDER — PROPRANOLOL HYDROCHLORIDE 80 MG/1
80 CAPSULE, EXTENDED RELEASE ORAL DAILY
Qty: 30 CAPSULE | Refills: 0 | Status: SHIPPED | OUTPATIENT
Start: 2022-02-17 | End: 2022-07-29

## 2022-02-16 RX ORDER — PREGABALIN 100 MG/1
100 CAPSULE ORAL 3 TIMES DAILY
Qty: 90 CAPSULE | Refills: 0 | Status: ON HOLD | OUTPATIENT
Start: 2022-02-16 | End: 2023-10-16

## 2022-02-16 RX ORDER — MULTIVIT-MIN/IRON/FOLIC ACID/K 18-600-40
2000 CAPSULE ORAL DAILY
Qty: 30 CAPSULE | Refills: 0 | Status: SHIPPED | OUTPATIENT
Start: 2022-02-16 | End: 2023-01-15

## 2022-02-16 RX ORDER — HYDROXYCHLOROQUINE SULFATE 200 MG/1
200 TABLET, FILM COATED ORAL 2 TIMES DAILY
Qty: 30 TABLET | Refills: 0 | Status: ON HOLD | OUTPATIENT
Start: 2022-02-16 | End: 2023-10-16

## 2022-02-16 RX ORDER — LURASIDONE HYDROCHLORIDE 40 MG/1
40 TABLET, FILM COATED ORAL
Qty: 30 TABLET | Refills: 0 | Status: SHIPPED | OUTPATIENT
Start: 2022-02-16 | End: 2022-12-08

## 2022-02-16 RX ORDER — METFORMIN HYDROCHLORIDE EXTENDED-RELEASE TABLETS 1000 MG/1
1000 TABLET, FILM COATED, EXTENDED RELEASE ORAL
Qty: 30 TABLET | Refills: 0 | Status: ON HOLD | OUTPATIENT
Start: 2022-02-16 | End: 2022-08-24

## 2022-02-16 RX ORDER — DESVENLAFAXINE 100 MG/1
100 TABLET, EXTENDED RELEASE ORAL DAILY
Qty: 30 TABLET | Refills: 0 | Status: ON HOLD | OUTPATIENT
Start: 2022-02-17 | End: 2023-10-16

## 2022-02-16 RX ORDER — FERROUS SULFATE 325(65) MG
325 TABLET ORAL
Qty: 30 TABLET | Refills: 0 | Status: SHIPPED | OUTPATIENT
Start: 2022-02-17

## 2022-02-16 RX ORDER — BUSPIRONE HYDROCHLORIDE 10 MG/1
20 TABLET ORAL 3 TIMES DAILY
Qty: 180 TABLET | Refills: 0 | Status: SHIPPED | OUTPATIENT
Start: 2022-02-16 | End: 2023-07-07

## 2022-02-16 RX ORDER — ONDANSETRON 4 MG/1
4 TABLET, ORALLY DISINTEGRATING ORAL EVERY 8 HOURS PRN
Qty: 15 TABLET | Refills: 0 | Status: SHIPPED | OUTPATIENT
Start: 2022-02-16

## 2022-02-16 RX ORDER — PROPRANOLOL HYDROCHLORIDE 10 MG/1
10 TABLET ORAL DAILY PRN
Qty: 30 TABLET | Refills: 0 | Status: SHIPPED | OUTPATIENT
Start: 2022-02-16 | End: 2022-07-29

## 2022-02-16 RX ORDER — CETIRIZINE HYDROCHLORIDE 10 MG/1
10 TABLET ORAL DAILY
Qty: 30 TABLET | Refills: 0 | Status: SHIPPED | OUTPATIENT
Start: 2022-02-16

## 2022-02-16 RX ADMIN — HYDROXYCHLOROQUINE SULFATE 200 MG: 200 TABLET, FILM COATED ORAL at 20:02

## 2022-02-16 RX ADMIN — PROPRANOLOL HYDROCHLORIDE 10 MG: 10 TABLET ORAL at 11:38

## 2022-02-16 RX ADMIN — Medication 2000 UNITS: at 08:09

## 2022-02-16 RX ADMIN — DESVENLAFAXINE SUCCINATE 100 MG: 100 TABLET, EXTENDED RELEASE ORAL at 08:09

## 2022-02-16 RX ADMIN — MELATONIN TAB 3 MG 3 MG: 3 TAB at 20:08

## 2022-02-16 RX ADMIN — PROPRANOLOL HYDROCHLORIDE 80 MG: 80 CAPSULE, EXTENDED RELEASE ORAL at 08:10

## 2022-02-16 RX ADMIN — BUSPIRONE HYDROCHLORIDE 20 MG: 10 TABLET ORAL at 14:28

## 2022-02-16 RX ADMIN — PREGABALIN 100 MG: 100 CAPSULE ORAL at 20:03

## 2022-02-16 RX ADMIN — PREGABALIN 100 MG: 100 CAPSULE ORAL at 14:28

## 2022-02-16 RX ADMIN — CETIRIZINE HYDROCHLORIDE 10 MG: 10 TABLET, FILM COATED ORAL at 20:02

## 2022-02-16 RX ADMIN — METFORMIN HYDROCHLORIDE 1000 MG: 500 TABLET, EXTENDED RELEASE ORAL at 17:34

## 2022-02-16 RX ADMIN — FERROUS SULFATE TAB 325 MG (65 MG ELEMENTAL FE) 325 MG: 325 (65 FE) TAB at 08:09

## 2022-02-16 RX ADMIN — PREGABALIN 100 MG: 100 CAPSULE ORAL at 08:09

## 2022-02-16 RX ADMIN — BUSPIRONE HYDROCHLORIDE 20 MG: 10 TABLET ORAL at 08:09

## 2022-02-16 RX ADMIN — HYDROXYCHLOROQUINE SULFATE 200 MG: 200 TABLET, FILM COATED ORAL at 08:09

## 2022-02-16 RX ADMIN — LURASIDONE HYDROCHLORIDE 40 MG: 40 TABLET, FILM COATED ORAL at 20:02

## 2022-02-16 RX ADMIN — BUSPIRONE HYDROCHLORIDE 20 MG: 10 TABLET ORAL at 20:02

## 2022-02-16 ASSESSMENT — ACTIVITIES OF DAILY LIVING (ADL)
DRESS: INDEPENDENT
ORAL_HYGIENE: INDEPENDENT
DRESS: INDEPENDENT
HYGIENE/GROOMING: WITH ASSISTANCE
HYGIENE/GROOMING: INDEPENDENT
ORAL_HYGIENE: INDEPENDENT
LAUNDRY: UNABLE TO COMPLETE
LAUNDRY: WITH SUPERVISION

## 2022-02-16 NOTE — PLAN OF CARE
Problem: Sleep Disturbance (Anxiety Signs/Symptoms)  Goal: Improved Sleep (Anxiety Signs/Symptoms)  Outcome: Improving       Vital signs: Temperature 97.8, pulse 76, /74, 02 sats 96 % on RA. Pt slept for the most part of the shift, she denies pain in her LUQ abdominal region. No PRN medication was given. Throughout the shift, Pt was assessed, no distress was noted, Lung sounds clear to auscultation in bilateral lung fields. No abdominal distension noted. Bowel sounds present to auscultion in all abdominal quadrants.She denied shortness of breath, denied nausea, denied SI/SIB, and contracted for safety. No side effect to the upper GI endoscopy procedure during shift. Pt still remain on SIO= 2:1 for self injury risk, and ingestion.        Pt. appeared to have slept for 6.5 hours. At 0620,Pt is in bed  sleeping with SIO 2:1 keeping close watch of her to promote safety. Staff to continue to offer support to pt.

## 2022-02-16 NOTE — PROGRESS NOTES
"    ----------------------------------------------------------------------------------------------------------  [facility], [place]   Psychiatric Progress Note  Hospital Day #6    Identifier: Nevin Alvarado is a 31 year old female with previous psychiatric diagnoses of MDD, PTSD, borderline personality disorder, CANDICE, factitious disorder who presents with recent self-injurious behavior (ingestion). Admission was voluntary. Given her extensive, previous history of self-injurious ingestions, assessment is that the current presentation is consistent with self-injurious ingestion related to previous diagnosis of BPD.     Interim History:   The patient's care was discussed with the treatment team and chart notes were reviewed.    Vitals: VSS  Sleep: 6.5 hours (02/16/22 0600)  Scheduled medications: Took all scheduled psychiatric medications as prescribed  PRN medications:   -Melatonin  -Ibuprofen  -Zofran  -Propanolol for anxiety with good effect    Staff Report:   Per chart review, pt returned from EGD with no complications or concerns. Slept for most of the night shift. Denies abd pain and in relatively good mood. Requesting restrictions to be lifted. Social with peers and staff.     Subjective:     Patient Interview:  Nevin Alvarado was interviewed in her bedroom. Denies abdominal pain and feels much better after removing ingested object. Pt was upset about being unable to join group in which other patients had supplies (cups, straws, pen). She continues to request that more restrictions be lifted, and has talked with guardian about this. We did agree to no longer keep her light on at night, and are continuing with all other restrictions out of high concern for patient safety. Reiterated that our first priority is her safety. Pt remarks \"I came her to go to groups and get better, but I just want to go home today if I can't get any help.\" Requests that team speak with guardian " "for consent. Did discuss our recommendation for DBT as outpatient, which she initially declines due to not wanting to change therapists and history of being triggered by DBT topics. Later, patient did decide to pursue this and called [facility].    Review of systems:     Patient denies acute concerns    Objective:   /74 (BP Location: Left arm, Patient Position: Supine)   Pulse 76   Temp 97.8  F (36.6  C) (Temporal)   Resp 18   Ht 1.575 m (5' 2\")   Wt 126.3 kg (278 lb 7.1 oz)   LMP 12/02/2021 (Approximate)   SpO2 96%   BMI 50.93 kg/m    Weight is 278 lbs 7.06 oz  Body mass index is 50.93 kg/m .    Mental Status Exam:  Oriented to:  Grossly Oriented  General:  Awake and Alert. Found in milieu.  Appearance:  appears stated age  Behavior/Attitude:  calm, minimizing and defensive  Eye Contact:  appropriate  Psychomotor: normal no catatonia present  Speech:  appropriate volume/tone  Language: Fluent in English with appropriate syntax and vocabulary.  Mood:  \"frustrated\"  Affect: Frustrated, disgruntled. Congruent with mood.  Thought Process:  linear, logical, goal-oriented  Thought Content: Denies thoughts of ingestion. No SI/HI/AH/VH; No apparent delusions  Associations:  intact  Insight:  fair   Judgment:  poor  Impulse control: poor  Attention Span:  grossly intact  Concentration:  grossly intact  Recent and Remote Memory:  grossly intact  Fund of Knowledge:  average  Muscle Strength and Tone: normal  Gait and Station: Normal. Walks well with walker.        Allergies:     Allergies   Allergen Reactions     Amoxicillin-Pot Clavulanate Other (See Comments), Swelling and Rash     PN: facial swelling, left side. Also had cortisone injection the same day as she started the Augmentin.  Noted in downtime recovery of chart.    PN: facial swelling, left side. Also had cortisone injection the same day as she started the Augmentin.; [facility] Comment: PN: facial swelling, left side. Also had cortisone injection the same day as " she started the Augmentin.Noted in downtime recovery of chart.; HUT Reaction: Rash; HUT Reaction: Unknown; HUT Reaction Type: Allergy; HUT Severity: Med; HUT Noted: [id number]     Oseltamivir Hives     med stopped, PN: med stopped  med stopped, PN: med stopped; HUT Comment: med stopped, PN: med stopped; HUT Reaction: Hives; HUT Reaction Type: Allergy; HUT Severity: Med; HUT Noted: [id number]     Penicillins Anaphylaxis     HUT Reaction: Anaphylaxis; HUT Reaction Type: Allergy; HUT Severity: High; HUT Noted: [id number]     Vancomycin Itching, Swelling and Rash     Other reaction(s): Redness  Flushed face, very itchy; HUT Comment: Flushed face, very itchy; HUT Reaction: Angioedema; HUT Reaction: Redness; HUT Severity: Med; HUT Noted: [id number]    facial     Hydrocodone Nausea and Vomiting and GI Disturbance     vomiting for days, PN: vomiting for days; HUT Comment: vomiting for days; HUT Reaction: Gastrointestinal; HUT Reaction: Nausea And Vomiting; HUT Reaction Type: Intolerance; HUT Severity: Med; HUT Noted: [id number]  vomiting for days       Acetaminophen Hives     Blood-Group Specific Substance Other (See Comments)     Patient has an anti-Cw and non-specific antibodies. Blood product orders may be delayed. Draw one red top and two purple top tubes for all type/screen/crossmatch orders.  Patient has anti-Cw, anti-K ([place]), Warm auto and nonspecific antibodies. Blood products may be delayed. Draw patient 24 hours prior to transfusion. Draw one red top and two purple top tubes for all type and screen orders.     Clavulanic Acid Angioedema     Naltrexone Other (See Comments)     Reaction(s): Vivid dreams.  Reaction(s): Vivid dreams.    Reaction(s): Vivid dreams.  Reaction(s): Vivid dreams.  Reaction(s): Vivid dreams.     Oxycodone Swelling     Adhesive Tape Rash     Silicone type  Silicone type     Cephalosporins Rash     Lamotrigine Rash     Possibly associated with Lamictal.   HUT Comment: Possibly associated with Lamictal.  ; HUT Reaction: Rash; HUT Reaction Type: Allergy; HUT Severity: Low; HUT Noted: [id number]     Latex Rash     HUT Reaction: Rash; HUT Reaction Type: Allergy; HUT Severity: Low; HUT Noted: [id number]        Labs:     Recent Results (from the past 24 hour(s))   Glucose by meter    Collection Time: 02/15/22 12:02 PM   Result Value Ref Range    GLUCOSE BY METER POCT 88 70 - 99 mg/dL   UPPER GI ENDOSCOPY    Collection Time: 02/15/22 12:29 PM   Result Value Ref Range    Upper GI Endoscopy       [address] [place] [address] [phone]     Endoscopy Department  _______________________________________________________________________________  Patient Name: Nevin Alvarado     Procedure Date: 2/15/2022 12:29 PM  MRN: [id number]                       Account Number: [id number]  YOB: 1990             Admit Type: Outpatient  Age: 31                                Gender: Female  Note Status: Finalized                Attending MD: Samia Stanton MD  Pause for the Cause: Completed        Total Sedation Time:   _______________________________________________________________________________     Procedure:             Upper GI endoscopy  Indications:           Foreign body in the stomach  Providers:             Samia Stanton MD, Mario Aguilar MD  Patient Profile:       31 yof, hx of MDD/CANDICE/BPD/PTSD, recurrent foreign body                          ingestion c/b esophageal perforation in the past, now                           presenting again with ingested foreign body (plastic                          spoon handle)  Referring MD:            Medicines:             See general anesthesia record  Complications:         No immediate complications.  _______________________________________________________________________________  Procedure:             Pre-Anesthesia Assessment:                         - Prior to the procedure, a History and Physical was                           performed, and patient medications and allergies were                          reviewed. The patient is unable to give consent                          secondary to the patient being legally incompetent to                          consent. The risks and benefits of the procedure and                          the sedation options and risks were discussed with the                          patient's guardian. All questions were answered and                          informed consent was obtained. Patient identificatio n                          and proposed procedure were verified by the physician                          and the nurse in the pre-procedure area. Mental Status                          Examination: anxious. Airway Examination: Mallampati                          Class II (the uvula but not tonsillar pillars                          visualized). Respiratory Examination: clear to                          auscultation. CV Examination: normal. Prophylactic                          Antibiotics: The patient does not require prophylactic                          antibiotics. Prior Anticoagulants: The patient has                          taken no anticoagulant or antiplatelet agents. ASA                          Grade Assessment: II - A patient with mild systemic                          disease. After reviewing the risks and benefits, the                          patient was deemed in satisfactory condition to                          undergo the procedure. The anesthesia plan was to use                           general anesthesia. Immediately prior to                          administration of medications, the patient was                          re-assessed for adequacy to receive sedatives. The                          heart rate, respiratory rate, oxygen saturations,                          blood pressure, adequacy of pulmonary ventilation, and                          response to  care were monitored throughout the                          procedure. The physical status of the patient was                          re-assessed after the procedure.                         After obtaining informed consent, the endoscope was                          passed under direct vision. Throughout the procedure,                          the patient's blood pressure, pulse, and oxygen                          saturations were monitored continuously. The Endoscope                          was introduced through the mouth, and advanced to the                          antrum of the sto mach. The upper GI endoscopy was                          accomplished without difficulty. The patient tolerated                          the procedure well.                                                                                   Findings:       A fibrotic shelf secondary to previous perforation was found in the        middle third of the esophagus.       Plastic spoon handle was found in the gastric body. Length 3.5 inches,        width 0.25 inches.       Scattered mild mucosal changes characterized by linear erosions,        nodularity and petechiae were found in the entire examined stomach. More        than likely secondary to superficial trauma from foreign body ingestion.                                                                                   Moderate Sedation:       See general anesthesia record  Impression:            - A fibrotic shelf secondary to previous perforation                          anastomosis was found in the middle third of the                           esophagus.                         - Plastic spoon handle was found in the stomach. This                          was removed with snare and simons without trauma or                          injury to the esophagus from removal.                         - Linearly eroded, nodular and petechial mucosa in the                          stomach.                          - No specimens collected.  Recommendation:        - [facility] to [facility] for [facility] care.                         - Advance diet as tolerated.                         - Avoid access to foreign objects as you are able.                         - [facility] will sign off at this time, please do not                          hesitate to reach out with questions or concerns.                                                                                     Electronically Signed by Dr NORTH Stanton  __________________  Samia Stanton MD  2/15/2022 4:31:46 PM  I was physically present for the entire viewing portion of  the exam.  __________________________  Signature of teaching physician  BERNIEc/[id number] Stanton MD    ________________________  Mario Aguilar MD  Number of Addenda: 0    Note Initiated On: 2/15/2022 12:29 PM  Scope In:  Scope Out:            Assessment    Primary psychiatric diagnosis:   Factitious disorder  Borderline personality disorder    Secondary psychiatric diagnoses of concern this admission:   CANDICE  MDD  Complex PTSD severe, recurrent    Diagnostic Impression:  Nevin Alvarado is a 31 year old  female with a past psychiatric history of MDD, PTSD, Borderline Personality Disorder, factitious disorder, and long history of swallowing inedible objects. Patient self-presented to the ER on 02/10/2022 after ingesting 2 wires from a surgical mask in the context of several other recent ingestions. Patient reports medication adherence. She is currently followed by Psychiatrist Kiersten Johnson, [facility] and PCP  Latonya Knight M.D at [facility]. Patient's support system includes family, county and outpatient team.  Substance use does not appear to be playing a contributing role in the patient's presentation.  There is genetic loading for mood and anxiety. Medical history does appear to be significant for obesity, chronic pain, autoimmune disorder.   Psychologically hx of trauma leading to chronic PTSD with maladaptive coping mechanism and SIB are a pivotal contributing factor to presentation. Socially, difficulty with interpersonal relationships and maintaining a job are factors affecting patient's ability to thrive in society. The MSE on admission was notable for low mood, anxious/exaggerated affect, pt being hypertalkative, tearful and feeling ashamed; no evidence of suicidal ideation or homicidal ideation, no evidence of psychotic thought, thoughts of self-harm, which are increased and no visual hallucinations present, no elicited delusions and impaired judgement. The ingestion of a spoon handle on 2/12 was further emblematic of poor coping skills, poor distress tolerance, and impulsivity. Her reported symptoms of SIB, mood lability, poor frustration tolerance and impulsivity, and difficulty with interpersonal relationships are consistent with her historic diagnosis of underlying borderline personality disorder and complex PTSD.      Psychiatric Hospital course:   Nevin Alvarado was admitted to [facility] as a voluntary patient. PTA meds were continued. New medications started at the time of admission include scheduled propanolol XR for both tremor and anxiety. This was approved by her legal guardian. On 2/12, patient ingested the handle of a spoon following a triggering conversation with peer. Patient did not describe motivation for event outside of the triggering conversation. Patient requested discharge on 2/16, guardian approved discharge back to  2/17 Patient plans to seek DBT as outpatient.     Medical course:   Patient was medically cleared for admission to psychiatric unit. S/p EGD by GI service to remove metal wires in ED. PTA medications were resumed. Pt ingested 7.5 cm plastic spoon handle on 2/12/22. Medicine consulted and 7.5cm foreign body was confirmed via CT abdomen. GI was consulted, EGD not pursued emergently due to patient  stability, low risk of perforation, entry into small bowel, and concerns regarding negative reinforcement for ingestion behavior. Ethics consulted by GI and recommended proceeding with EGD. EGD completed under general anesthesia 2/15 without complication.    Data:   UDS negative  TSH 4.94, T4  0.87  Lipids: triglycerides 266, otherwise WNL  CBC: WBC 14.9, otherwise WNL  Folate, Vit B12, Vit D,  normal  Amylase normal  BMP normal  Covid-19 negative  UPT negative    Plan     Today's changes:   -None    Psychotropic Medications:  Scheduled:  Buspirone 20 mg TID  Desvenlafaxine 100 mg daily  Lurasidone 40 mg with supper  Pregabalin 100 mg TID\  Propanolol SA 80mg daily     PRN:  -Maalox 30 ml Q4H PRN for indigestion  -Hydroxyzine 25-50 mg Q6H PRN for anxiety  -Melatonin 3mg at bedtime  -Miralax prn for constipation  -NRT  -Olanzapine 10 mg PO/IM prn Q2H severe agitation/psychosis  -Trazodone 50 mg PRN at bedtime  -Propanolol 10 mg prn daily   -Ibuprofen 600mg q6h prn for menstrual cramps  -Psyllium daily prn    Additional Plans:  Patient will be treated in therapeutic milieu with appropriate individual and group therapies as described.    Medical diagnoses to be addressed this admission:    #Foreign body ingestion   Pt ingested 7.5 cm plastic spoon handle on 2/12/22. Medicine consulted and 7.5cm foreign body was confirmed via CT abdomen. GI was consulted, EGD not pursued emergently due to patient stability, low risk of perforation, entry into small bowel, and concerns regarding negative reinforcement for ingestion behavior. Ethics consulted by GI, and recommended proceeding with EGD. EGD completed 2/15 without complication.    #Intention Tremor  Unclear etiology. No family history of essential tremor. No resting tremor or symptoms of parkinsonism.   -Propanolol SA 80mg daily    #DM vs prediabetes  Blood glucose 2/8/22 108.  -PTA metformin XR 1000mg daily    #Subclinical hypothyroidism  TSH 4.94, T4  0.87. Asymptomatic.  "    Consults:   --Medicine/GI for foreign body ingestion (2/13/22):   \"CT shows 7.5 cm foreign body in stomach. Case discussed with GI team. Foreign body unlikely to pass out of stomach given size. GI team does not feel that transfer to inpatient medicine is necessary at this time given lower risk of gastric perforation. No indication for urgent endoscopy at this time. Given history of recurrent foreign body ingestion possibly for secondary gain, GI recommends ethics consult prior to proceeding as repeat procedures may continue to encourage future behaviors.   - Continue NPO status for now  - Will reassess EGD plan and diet in AM  - Contact medicine for any change in status, particularly if febrile, vital changes, or worsening pain with obvious evidence of distress\"    Update following conversation 2/14 PM: Will proceed with EGD following their conversation with ethics committee. Patient tentatively scheduled for EGD on [place] 2/15. Will need general anesthesia due to risk of injury to lung given nature of ingested object. Patient may drink water only until midnight, then strict NPO at midnight.    Update 2/15: EGD findings: healed esophageal deformity, linear, green, plastic density consistent with spoon handle in body of stomach successfully removed, scattered superficial mucosal injury in stomach near foreign object. Recommend regular diet. Signed off.    --Full Ethics Consult 2/14  Recommend proceeding with EGD. See note 2/14 for further details.    Legal Status: Voluntary with legal guardian    Safety Assessment:   Behavioral Orders   Procedures     Code 1 - Restrict to Unit     Code 2     For xray     Routine Programming     As clinically indicated     Self Injury Precaution     Status 15     Every 15 minutes.     Status Individual Observation     2:1 observation. Patient SIO status reviewed with team/RN.  Please also refer to RN/team documentation for add'l details - detailed plan in documentation.     " Order Specific Question:   CONTINUOUS 24 hours / day     Answer:   1 foot     Order Specific Question:   Indications for SIO     Answer:   Self-injury risk     Suicide precautions     Patients on Suicide Precautions should have a Combination Diet ordered that includes a Diet selection(s) AND a Behavioral Tray selection for Safe Tray - with utensils, or Safe Tray - NO utensils     Suicide precautions     Patients on Suicide Precautions should have a Combination Diet ordered that includes a Diet selection(s) AND a Behavioral Tray selection for Safe Tray - with utensils, or Safe Tray - NO utensils         SIO: Yes, 2:1    Safety care plan:  Information from Dr. Ledesma:    The patient does have moderate to good insight into her SIB.  She says that small metal objects are the most concerning triggers for her and that she has not had problems with things like plastic utensils or plastic markers.  She says paperclips, mickie pins, wire twist ties, and pen springs are the most dangerous for her, this is confirmed from looking at her medical record.  he has a hx of eating disorder and it is important to not place too much focus on mealtime restrictions - to reduce risk for triggering.                  2:1 staff with 1 foot distance. At least 1 female staff (See SIO order.)              Staff must always observe hands.              No bathroom door              Discontinued: Room lights and bathroom light on 24 hours per day              Staff members must not have swallowable objects on persons (such as pens).              Patient will eat in room.              Finger foods only, remove packaging outside of room before given to patient.              Make sure all items from tray are removed from room.              Make sure meal tray cart is always locked to reduce access items that may be ingestible.   Patient's room is away from [facility] area to reduce access to objects.              No packaged toiletries, packaging, or  small objects within her reach. (Is on her period, may have extra sanitary pads in her room to support patient dignity.) May use her own brush.              Room sweep prior to patient entering room              Room sweep at least twice (with accountability checks).              Staff must inspect bathroom and shower looking for and removing small objects before she enters              Unit environment will be swept regularly by staff to remove any objects that could be ingested              Masks - use only the cloth masks, do not allow masks with wires or plastic              Planned time outside of room - when patient is out of room which must be planned, environment is swept, and patient room is swept when returns.              No hairbrush, no personal shampoo.              Clarified: No groups with any supplies, including objects on other patients in close proximity    This plan is in effect until changed by Jessica Avalos or Johanny Whiteside.     Disposition:  Patient requested discharge on 2/16, guardian approved discharge back to  2/17 Patient plans to seek DBT as outpatient.     Attestation:   I was present with the medical student Rafita Pyle, MS3 who participated in the service and in the documentation of the note.  I have verified the history and personally performed the physical exam and medical decision making. I agree with the assessment and plan of care as documented in the note.    This patient was seen and discussed with my attending physician.  Yolanda Alonzo MD  PGY-1 Psychiatry Resident Physician    Attestation:  This patient has been seen and evaluated by me, Marian Ledesma MD.  I have discussed this patient with the house staff team including the resident and medical student and I agree with the findings and plan in this note.    I have reviewed today's vital signs, medications, labs and imaging. Marian Ledesma MD , PhD.

## 2022-02-16 NOTE — PLAN OF CARE
"  Problem: Adult Inpatient Plan of Care  Goal: Plan of Care Review  Outcome: Ongoing, Progressing  Flowsheets  Taken 2/16/2022 1230  Plan of Care Reviewed With: patient  Overall Patient Progress: improving  Taken 2/16/2022 1116  Plan of Care Reviewed With: patient     Problem: Suicidal Behavior  Goal: Suicidal Behavior is Absent or Managed  Outcome: Ongoing, Progressing     Problem: Activity and Energy Impairment (Anxiety Signs/Symptoms)  Goal: Optimized Energy Level (Anxiety Signs/Symptoms)  Outcome: Ongoing, Progressing    On initial assessment, patient denied all psych symptoms and contracted for safety. She continues to be on a 2:1 SIO status due to high risk and history of ingesting foreign objects. She appeared to have a flat affect but was at times pleasant in her interactions with staff. She denied abdominal pain post EGD procedure (yesterday.) Later in the afternoon, patient complained of anxiety which she rated at 7/10 for which PRN propanolol was given. Patient complained of having \"too many restrictions\" placed on her per safety care plan. She was informed that the restrictions would be adjusted depending on her progress, for which she verbalized understanding. Will continue to monitor.   "

## 2022-02-16 NOTE — PROGRESS NOTES
"RESPIRATORY THERAPY NOTE    Writer called to set-up patient for CPAP/BIPAP usage at night. Patient declined equipment at this time. Order was discontinued.    /73 (BP Location: Left arm, Patient Position: Sitting)   Pulse 75   Temp 97  F (36.1  C) (Temporal)   Resp 18   Ht 1.575 m (5' 2\")   Wt 126.3 kg (278 lb 7.1 oz)   LMP 12/02/2021 (Approximate)   SpO2 98%   BMI 50.93 kg/m      Kenzie High RT on 2/16/2022 at 4:46 PM        "

## 2022-02-16 NOTE — PLAN OF CARE
Pt returned from [place] this evening after EGD was completed. Pt was alert and oriented x4. Pt was compliant with search after returning to the unit. Pt presented with full range affect. Denied for pain or discomfort. Pt ate dinner 100% and nausea reported. Pt denies suicidal ideation or SIB urges. Denied AH/VH, depression or anxiety. Pt continues to be 2:1 SIO for ingestion precautions. Pt was social with staff and peers. Will monitor.    Problem: Adult Inpatient Plan of Care  Goal: Absence of Hospital-Acquired Illness or Injury  Outcome: No Change  Goal: Readiness for Transition of Care  Outcome: No Change     Problem: Suicidal Behavior  Goal: Suicidal Behavior is Absent or Managed  Outcome: Improving

## 2022-02-16 NOTE — PLAN OF CARE
Goal Outcome Evaluation:    Plan of Care Reviewed With: patient     Overall Patient Progress: improving    Outcome Evaluation: patient only received toradol for postop pain, stable for transition back to [facility] care    Assessment/Intervention/Current Symptoms and Care Coordination: Met with team. Reviewed patient's chart and progress notes. Writer spoke with Legal Guardian  ([phone]), Aaliyah, and she is aware patient will be discharged tomorrow due to insurance not covering further hospitalization. Aaliyah gave verbal consent for discharge.  Writer called [facility] and left two messages requesting a call back on whether or not patient could be admitted to their facility on 2/16. Writer sent an email to patient's outpatient team stating that patient will be discharged tomorrow. Writer received a call from legal guardian, this provided the contact number for the , Louise. Writer called Louise and she stated that patient can be admitted tomorrow at 1:00 PM.         Discharge Plan or Goal: Will discharged back to [facility]. Will follow up with outpatient treatment team.         Barriers to Discharge: Engaged in SIB by ingestion. Needs clinical stabilization and medication management.            Referral Status:           Legal Status: Has Legal Guardian

## 2022-02-16 NOTE — DISCHARGE SUMMARY
"    Psychiatric Discharge Summary    Hospital Day #7    Nevin Alvarado MRN# [id number]   Age: 31 year old YOB: 1990     Date of Admission:  2/8/2022  Date of Discharge:  2/17/2022  Admitting Physician:  Marian Ledesma MD  Discharge Physician:  Marian Ledesma MD         Event Leading to Hospitalization:   \"Nevin Alvarado is a 31 year old  female with a past psychiatric history of MDD, PTSD  and Borderline Personality Disorder, factitious disorder,  has a long history of swallowing inedible objects as intentional self-harm.   Pt admitted from the  ER on 02/10/2022 due to concern for out of control behaviors and SIB, mood lability, sleep issues, poor frustration tolerance and impulsivein the context of psychosocial stressors, including psychosocial stressors including hx of trauma, chronic mental health issues, family dynamics and medical issues being overly-sensitive to criticism, mood liability, maladaptive coping mechanism. Patient  reports medication adherence, denies substance use. Nevin has extensive psychiatric history with close outpatient mental health follow up (currently residing in a group home and has a [facility] conservator), recurrent swallowing of foreign bodies with esophageal perforation s/p stenting, and chronic pain disorder with [facility] emergency care plan, Please see note under provider summary tab > important patient care coordination information for further details.         Per patient report:    Nevin Alvarado reports that she has been living at [facility] and that over past 6 months se has been doing well, without swallowing or SIB and has been able to get/maintain a job at [employer], with the help of a , with a goal of  Obtaining approval approval to move out of the [facility] and into her own place.      She reports last being well was a few weeks ago  and that has experienced worsening/increased anxiety attributing " "this to just recently she having pneumonia and the medical care was triggering for her swallowing urges  and that being at work surrounded by small office supply objects is a trigger for self harm behaviors as well.      Mood has been lower, due to anxiety. Denies feeling \"super depressed\" But endorses more feelings of guilt. Increased sleep lately, with more naps during the day. No changes in appetite, energy, concentration. Denies SILou Rooney attributes her symptoms & decompensation to decreased frequency seeing her therapist from x2/week to once/every other week. She reports she has been taking her psychiatric medications as prescribed. She last took these medications. She denies recent (or hx of) substance use.  She was last seen by her outpatient psychiatric provider earlier this week.     She ended up swallowing a paperclip at work a few weeks ago. She's been engaged in lots of negative self-talk since then. She's also been struggling with feelings of a\"being a failure.\"   She again swallowed the mask wires, and wanted to come to the hospital to help her \"switch gears.\" and get back on track. She does not want to change her meds, but does want to work on skills. She tried propranolol for the first time in the ED, and found it to be very helpful. She is hoping for a short stay. She has had DBT 3x in the past.     Has neuropathy, needs to use a walker. Needs assistance to bend over, shower chair. Has granularum annulare, recently cut back on hydroxycloroquine 5 months ago. Reports broke out from a rash from scrubs provided at the ED.      Per ED Note:   \"Regarding her current presentation, patient was on break at work around 1445 (~2 hours ago) when she swallowed a straightened paper clip secondary to increased anxiety. She is unsure why she straightened it out other than to make it easier for her to swallow. She states this was another \"self harm\" episode, of which she has had three in the last two weeks. When " "questioned on this further she denies an intent to harm herself or having suicidal thoughts. She instead notes that \"everyone that knows me calls it self harm\" and so that is how she describes these episodes as it's \"all I know.\" She denies nausea, vomiting or hematemesis, bloody stools, dyspnea, cough, or fever. Denies possibility of pregnancy. She last ate this morning. \"   Wires were removed by GI service. She was  medically cleared for admission to inpatient psychiatric unit.     Per DEC assessment:  In the past three weeks, patient has been to the hospital four times (1/30/22, 2/2/22, 2/7/22, and 2/9/22) due to having ingested foreign objects.  Patient has a several year history of this behavior, which has resulted in numerous procedures and surgeries as well as psychiatric interventions.  At this time, patient feels like she is in a spiral of anxiety, urges to self-harm, and acting on self-harm.  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  Patient reports having been swallowing objects on her lunch breaks while at work.  Just yesterday paperclips were removed after she swallowed them.       ED/Hospital Course   She did have an EGD at the ED which removed the metal objects. She had been medically stable. Was requesting multiple times IV medications for anxiety. Her home meds were ordered including her as needed propranolol that she can take up to 2 times a day. We also offered her hydroxyzine which she refused. Staff discussed with the patient that she would not get IV medications unless necessary.\"       See Admission note by Dudley Bryant MD on 2/10/22 for additional details.          Diagnoses:   Primary psychiatric diagnosis:   Factitious disorder  Borderline personality disorder     Secondary psychiatric diagnoses of concern this admission:   CANDICE  MDD  Complex PTSD severe, recurrent     Diagnostic Impression:  Nevin Alvarado is a 31 year " "old  female with a past psychiatric history of MDD, PTSD, Borderline Personality Disorder, factitious disorder, and long history of swallowing inedible objects. Patient self-presented to the ER on 02/10/2022 after ingesting 2 wires from a surgical mask in the context of several other recent ingestions. Patient reports medication adherence. She is currently followed by Psychiatrist Kiersten Johnson, [facility] and PCP  Latonya Knight M.D at [facility]. Patient's support system includes family, county and outpatient team.  Substance use does not appear to be playing a contributing role in the patient's presentation.  There is genetic loading for mood and anxiety. Medical history does appear to be significant for obesity, chronic pain, autoimmune disorder.  Psychologically hx of trauma leading to chronic PTSD with maladaptive coping mechanism and SIB are a pivotal contributing factor to presentation. Socially, difficulty with interpersonal relationships and maintaining a job are factors affecting patient's ability to thrive in society. The MSE on admission was notable for low mood, anxious/exaggerated affect, pt being hypertalkative, tearful and feeling ashamed; no evidence of suicidal ideation or homicidal ideation, no evidence of psychotic thought, thoughts of self-harm, which are increased and no visual hallucinations present, no elicited delusions and impaired judgement. The ingestion of a spoon handle on 2/12 was further emblematic of poor coping skills, poor distress tolerance, and impulsivity. Her reported symptoms of SIB, mood lability, poor frustration tolerance and impulsivity, and difficulty with interpersonal relationships are consistent with her historic diagnosis of underlying borderline personality disorder and complex PTSD.           Consults:   --Medicine/GI for foreign body ingestion (2/13/22):   \"CT shows 7.5 cm foreign body in stomach. Case discussed with GI team. Foreign body unlikely to " "pass out of stomach given size. GI team does not feel that transfer to inpatient medicine is necessary at this time given lower risk of gastric perforation. No indication for urgent endoscopy at this time. Given history of recurrent foreign body ingestion possibly for secondary gain, GI recommends ethics consult prior to proceeding as repeat procedures may continue to encourage future behaviors.   - Continue NPO status for now  - Will reassess EGD plan and diet in AM  - Contact medicine for any change in status, particularly if febrile, vital changes, or worsening pain with obvious evidence of distress\"     Update following conversation 2/14 PM: Will proceed with EGD following their conversation with ethics committee. Patient tentatively scheduled for EGD on [place] 2/15. Will need general anesthesia due to risk of injury to lung given nature of ingested object. Patient may drink water only until midnight, then strict NPO at midnight.     Update 2/15: EGD findings: healed esophageal deformity, linear, green, plastic density consistent with spoon handle in body of stomach successfully removed, scattered superficial mucosal injury in stomach near foreign object. Recommend regular diet. Signed off.     --Full Ethics Consult 2/14  Recommend proceeding with EGD. See note 2/14 for further details.         Hospital Course:   Psychiatric Course:  Nevin Alvarado was admitted to [facility] as a voluntary patient. PTA meds were continued. New medications started at the time of admission include scheduled propanolol XR for both tremor and anxiety (while continuing propanolol 10mg TID prn for anxiety). This was approved by her legal guardian. On 2/12, patient ingested the handle of a spoon following a triggering conversation with peer. Patient did not describe motivation for event outside of the triggering conversation. Patient requested discharge on 2/16, guardian approved discharge back to  2/17 Patient plans to " seek DBT as outpatient.     Medical Course: Patient was medically cleared for admission to psychiatric unit. S/p EGD by GI service to remove metal wires in ED. PTA medications were resumed. Pt ingested 7.5 cm plastic spoon handle on 2/12/22. Medicine consulted and 7.5cm foreign body was confirmed via CT abdomen. GI was consulted, EGD not pursued emergently due to patient stability, low risk of perforation, entry into small bowel, and concerns regarding negative reinforcement for ingestion behavior. Ethics consulted by GI and recommended proceeding with EGD. EGD completed under general anesthesia 2/15 without complication.    #Foreign body ingestion   Pt ingested 7.5 cm plastic spoon handle on 2/12/22. Medicine consulted and 7.5cm foreign body was confirmed via CT abdomen. GI was consulted, EGD not pursued emergently due to patient stability, low risk of perforation, entry into small bowel, and concerns regarding negative reinforcement for ingestion behavior. Ethics consulted by GI, and recommended proceeding with EGD. EGD completed 2/15 without complication.     #Intention Tremor  Unclear etiology. No family history of essential tremor. No resting tremor or symptoms of parkinsonism.   -Propanolol SA 80mg daily     #DM vs prediabetes  Blood glucose 2/8/22 108.  -PTA metformin XR 1000mg daily     #Subclinical hypothyroidism  TSH 4.94, T4  0.87. Asymptomatic.     Admission labs were notable for the following:  UDS negative  TSH 4.94, T4  0.87  Lipids: triglycerides 266, otherwise WNL  CBC: WBC 14.9, otherwise WNL  Folate, Vit B12, Vit D,  normal  Amylase normal  BMP normal  Covid-19 negative  UPT negative    Risk Assessment:      Today Nevin Alvarado denies thoughts of self-harm, SI, HI. She has notable risk factors for self-harm, including single status, anxiety, comorbid medical condition of self-harm, factitious disorder, BPD and previous suicide attempts. However, risk is mitigated by ability to volunteer a  safety plan and history of seeking help when needed. Therefore, based on all available evidence including the factors cited above, she does not appear to be at imminent risk for self-harm, does not meet criteria for a 72-hr hold, and therefore remains appropriate for ongoing outpatient level of care. Additional steps taken to minimize risk include: medication optimization, close psychiatric follow up and provision of crisis resources.     Nevin was released to group home. During this admission, she was unable to participate in groups per safety plan and was visible in the milieu, and her symptoms of self-harm improved. At the time of discharge she was determined to not be a danger to herself or others.     This document serves as a transfer of care to Nevin ZULUAGA Jenny's outpatient providers.         Discharge Medications:     Discharge Medication List as of 2/17/2022 11:52 AM      START taking these medications    Details   propranolol ER (INDERAL LA) 80 MG 24 hr capsule Take 1 capsule (80 mg) by mouth daily, Disp-30 capsule, R-0, E-Prescribe         CONTINUE these medications which have CHANGED    Details   acetaminophen (TYLENOL) 500 MG tablet Take 1-2 tablets (500-1,000 mg) by mouth every 6 hours as needed for mild pain, Disp-15 tablet, R-0, No Print Out      busPIRone (BUSPAR) 10 MG tablet Take 2 tablets (20 mg) by mouth 3 times daily, Disp-180 tablet, R-0, E-Prescribe      cetirizine (ZYRTEC) 10 MG tablet Take 1 tablet (10 mg) by mouth daily, Disp-30 tablet, R-0, E-Prescribe      Cholecalciferol (VITAMIN D) 50 MCG (2000 UT) CAPS Take 2,000 Units by mouth daily, Disp-30 capsule, R-0, E-Prescribe      desvenlafaxine (PRISTIQ) 100 MG 24 hr tablet Take 1 tablet (100 mg) by mouth daily, Disp-30 tablet, R-0, E-Prescribe      ferrous sulfate (FEROSUL) 325 (65 Fe) MG tablet Take 1 tablet (325 mg) by mouth daily (with breakfast), Disp-30 tablet, R-0, E-Prescribe      hydroxychloroquine (PLAQUENIL) 200 MG tablet  Take 1 tablet (200 mg) by mouth 2 times daily, Disp-30 tablet, R-0, E-Prescribe      lurasidone (LATUDA) 40 MG TABS tablet Take 1 tablet (40 mg) by mouth daily (with dinner), Disp-30 tablet, R-0, E-Prescribe      metFORMIN (FORTAMET) 1000 MG 24 hr tablet Take 1 tablet (1,000 mg) by mouth daily (with dinner), Disp-30 tablet, R-0, E-Prescribe      ondansetron (ZOFRAN-ODT) 4 MG ODT tab Take 1 tablet (4 mg) by mouth every 8 hours as needed for nausea, Disp-15 tablet, R-0, E-Prescribe      pregabalin (LYRICA) 100 MG capsule Take 1 capsule (100 mg) by mouth 3 times daily, Disp-90 capsule, R-0, E-Prescribe      propranolol (INDERAL) 10 MG tablet Take 1 tablet (10 mg) by mouth daily as needed (anxiety), Disp-30 tablet, R-0, E-Prescribe         CONTINUE these medications which have NOT CHANGED    Details   albuterol (PROAIR HFA/PROVENTIL HFA/VENTOLIN HFA) 108 (90 Base) MCG/ACT inhaler Inhale 2 puffs into the lungs every 6 hours as needed for shortness of breath / dyspnea or wheezing, Disp-18 g, R-0, Local PrintPharmacy may dispense brand covered by insurance (Proair, or proventil or ventolin or generic albuterol inhaler)      valACYclovir (VALTREX) 1000 mg tablet Take 2 tablets by mouth two times daily for one day. Use as needed at onset of cold sore. , Historical      Respiratory Therapy Supplies (NEBULIZER) BRENDAN Nebulizer device.  Albuterol nebulization every 2 hours as needed for shortness of breath or wheezing., Disp-1 each, R-0, Local PrintInclude tubing and mask for age please.         STOP taking these medications       albuterol (PROVENTIL) (2.5 MG/3ML) 0.083% neb solution Comments:   Reason for Stopping:         methylcellulose (CITRUCEL) powder Comments:   Reason for Stopping:                    Psychiatric Examination:   Mental Status Exam:  Oriented to:  Grossly Oriented  General:  Awake and Alert.   Appearance:  appears stated age  Behavior/Attitude:  calm, cooperative  Eye Contact:  appropriate  Psychomotor:  "normal no catatonia present  Speech:  appropriate volume/tone  Language: Fluent in English with appropriate syntax and vocabulary.  Mood:  \"good\"  Affect: Bright, jovial at times  Thought Process:  linear, logical, goal-oriented  Thought Content: Denies thoughts of ingestion or SI. No evidence of AH/VH; No apparent delusions  Associations:  intact  Insight:  fair   Judgment:  fair  Impulse control: poor  Attention Span:  grossly intact  Concentration:  grossly intact  Recent and Remote Memory:  grossly intact  Fund of Knowledge:  average  Muscle Strength and Tone: normal  Gait and Station: Normal. Walks well with walker.         Discharge Plan:   Psychiatric Appointments:  22 with Linda De La Rosa    Other Referrals: PCP 22 with Latonya Knight Pt seen and discussed with my attending physician, Marian Ledesma MD.   Yolanda Alonzo MD  PGY-1 Psychiatry Resident Physician    Attestation:   The patient has been seen and evaluated by me,  Marian Ledesma MD. I have examined the patient today and reviewed the discharge plan with the resident and medical student. I agree with the final assessment and plan, as noted in the discharge summary. I have reviewed today's vital signs, medications, labs and imaging.  Total time discharge plannin minutes  Marian Ledesma MD ,Ph.D.            Appendix A: All Labs This Admission:     Results for orders placed or performed during the hospital encounter of 22   UPPER GI ENDOSCOPY     Status: None   Result Value Ref Range    Upper GI Endoscopy       [address] [address] [phone]     Endoscopy Department  _______________________________________________________________________________  Patient Name: Nevin Alvarado     Procedure Date: 2022 9:26 PM  MRN: [id number]                       Account Number: [id number]  YOB: 1990             Admit Type: Outpatient  Age: 31                 "                Gender: Female  Note Status: Finalized                Attending MD: Lyndsey Croft MD  Pause for the Cause: Done             Total Sedation Time: See general   anesthesia documentation  _______________________________________________________________________________     Procedure:             Upper GI endoscopy  Indications:           Foreign body in the stomach  Providers:             Lyndsey Croft MD, Mario Aguilar MD  Patient Profile:       31 yof with MDD/CANDICE, recurrent foreign body ingestion,                          history of  esophageal perforation, morbid obesity,                          presenting with foreign body ingestion, will proceed                          to EGD  Referring MD:            Medicines:             General Anesthesia  Complications:         No immediate complications.  _______________________________________________________________________________  Procedure:             Pre-Anesthesia Assessment:                         - Prior to the procedure, a History and Physical was                          performed, and patient medications and allergies were                          reviewed. The patient is unable to give consent                          secondary to the patient being legally incompetent to                          consent. The risks and benefits of the procedure and                          the sedation options and risks were discussed with the                          patient's guardian. All questions were answered and                          informed consent was obtained . Patient identification                          and proposed procedure were verified by the physician                          and the nurse in the pre-procedure area. Mental Status                          Examination: anxious. Airway Examination: normal                          oropharyngeal airway and neck mobility. Respiratory                          Examination:  clear to auscultation. CV Examination:                          normal. Prophylactic Antibiotics: The patient does not                          require prophylactic antibiotics. Prior                          Anticoagulants: The patient has taken no anticoagulant                          or antiplatelet agents. ASA Grade Assessment: II - A                          patient with mild systemic disease. After reviewing                          the risks and benefits, the patient was deemed in                          satisfactory condition to undergo the procedure. The                          anesthesia plan was to use gen eral anesthesia.                          Immediately prior to administration of medications,                          the patient was re-assessed for adequacy to receive                          sedatives. The heart rate, respiratory rate, oxygen                          saturations, blood pressure, adequacy of pulmonary                          ventilation, and response to care were monitored                          throughout the procedure. The physical status of the                          patient was re-assessed after the procedure.                         After obtaining informed consent, the endoscope was                          passed under direct vision. Throughout the procedure,                          the patient's blood pressure, pulse, and oxygen                          saturations were monitored continuously. The Endoscope                          was introduced through the mouth, and advanced to the                          second part of duodenum. The upper GI endo scopy was                          accomplished without difficulty. The patient tolerated                          the procedure well.                                                                                   Findings:       One benign-appearing, intrinsic mild stenosis was found. The stenosis        was  traversed easily. This was identified as the likely site of a past        perforation.       Localized moderate mucosal changes characterized by nodularity and        scarring were found in the middle third of the esophagus.       Two mask wires/twist ties were found in the gastric body. Removal was        accomplished with a snare and a rat tooth forceps.       Scattered mild mucosal changes characterized by erosion were found in        the gastric body, more than likely secondary to foreign bodies.       The duodenal bulb, first portion of the duodenum and second portion of        the duodenum were normal.                                                                                    Moderate Sedation:       N/A General Anesthesia  Impression:            - Benign-appearing esophageal stenosis.                         - Nodular, scarred mucosa in the esophagus.                         - Two mask wires/twist ties were found in the stomach.                          Removal was successful.                         - Scattered eroded mucosa in the gastric body.                         - Normal duodenal bulb, first portion of the duodenum                          and second portion of the duodenum.  Recommendation:        - Transport patient to ER on [facility] for further                          monitoring and cares                         - Recommend engagement with MH/inpatient psych                         - CLD, then ADAT                         - PRN viscous lidocaine/GI cocktail for possible                          oropharyngeal pain (patient has had two EGDs and has                          been intubated twice in last 24 hours)                          - [facility] will sign off, please reach out with                          questions or concerns                                                                                     Electronically Signed by Dr. Croft  ______________________  Lyndsey PAT  MD Guerda  2/9/2022 8:56:45 AM  I was physically present for the entire viewing portion of the exam.  __________________________  Signature of teaching physician  Amairani/Jennifer Croft MD    ________________________  Mario Aguilar MD  Number of Addenda: 0    Note Initiated On: 2/8/2022 9:26 PM  Scope In:  Scope Out:     UPPER GI ENDOSCOPY     Status: None   Result Value Ref Range    Upper GI Endoscopy       [address] [place] [address] [phone]     Endoscopy Department  _______________________________________________________________________________  Patient Name: Nevin Alvarado     Procedure Date: 2/15/2022 12:29 PM  MRN: [id number]                       Account Number: [id number]  YOB: 1990             Admit Type: Outpatient  Age: 31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 ESOPHAGOSCOPY, GASTROSCOPY, DUODENOSCOPY (EGD)   [[id number]]attempted removal of foreign body;  Surgeon:   Pamela Perez MD;  [facility]      ESOPHAGOSCOPY, GASTROSCOPY, DUODENOSCOPY (EGD), COMBINED N/A   6/9/2017    Procedure: COMBINED ESOPHAGOSCOPY, GASTROSCOPY, DUODENOSCOPY   (EGD), REMOVE FOREIGN BODY;  Esophagoscopy, Gastroscopy,   Duodenoscopy, Removal of Foreign Body;  Surgeon: Dejon Marsh MD;  [facility]     ESOPHAGOSCOPY, GASTROSCOPY, DUODENOSCOPY (EGD), COMBINED N/A   1/6/2018    Procedure: COMBINED ESOPHAGOSCOPY, GASTROSCOPY, DUODENOSCOPY   (EGD), REMOVE FOREIGN BODY;  COMBINED ESOPHAGOSCOPY, GASTROSCOPY,   DUODENOSCOPY (EGD) [by pascal net and snare with profol sedation;    Surgeon: Feliciano Emmanuel MD;  Location:  GI     ESOPHAGOSCOPY, GASTROSCOPY, DUODENOSCOPY (EGD), COMBINED N/A   3/19/2018    Procedure: COMBINED ESOPHAGOSCOPY, GASTROSCOPY, DUODENOSCOPY   (EGD), REMOVE FOREIGN BODY;   Esophagodscopy, Gastroscopy,   Duodenoscopy,Foreign Body Removal;  Surgeon: Brice Guzmán MD;    [facility]     ESOPHAGOSCOPY, GASTROSCOPY, DUODENOSCOPY (EGD), COMBINED N/A   4/16/2018    Procedure: COMBINED ESOPHAGOSCOPY, GASTROSCOPY, DUODENOSCOPY   (EGD), REMOVE FOREIGN BODY;  Esophagogastroduodenoscopy  Foreign   Body Removal X 2;  Surgeon: Royer Moise MD;    [facility]     ESOPHAGOSCOPY, GASTROSCOPY, DUODENOSCOPY (EGD), COMBINED N/A   6/1/2018    Procedure: COMBINED ESOPHAGOSCOPY, GASTROSCOPY, DUODENOSCOPY   (EGD), REMOVE FOREIGN BODY;  COMBINED ESOPHAGOSCOPY, GASTROSCOPY,   DUODENOSCOPY with removal of foreign body, propofol sedation by   anesthesia;  Surgeon: Brice Martinez MD;  Location:  GI     ESOPHAGOSCOPY, GASTROSCOPY, DUODENOSCOPY (EGD), COMBINED N/A   7/25/2018    Procedure: COMBINED ESOPHAGOSCOPY, GASTROSCOPY, DUODENOSCOPY   (EGD), REMOVE FOREIGN BODY;;  Surgeon: Candy Castelan MD;  Location:  GI     ESOPHAGOSCOPY, GASTROSCOPY, DUODENOSCOPY (EGD), COMBINED N/A   7/28/2018    Procedure: COMBINED ESOPHAGOSCOPY, GASTROSCOPY, DUODENOSCOPY   (EGD), REMOVE FOREIGN BODY;  COMBINED  ESOPHAGOSCOPY, GASTROSCOPY,   DUODENOSCOPY (EGD), REMOVE FOREIGN BODY;  Surgeon: Brice Guzmán MD;  [facility]     ESOPHAGOSCOPY, GASTROSCOPY, DUODENOSCOPY (EGD), COMBINED N/A   7/31/2018    Procedure: COMBINED ESOPHAGOSCOPY, GASTROSCOPY, DUODENOSCOPY   (EGD);  COMBINED ESOPHAGOSCOPY, GASTROSCOPY, DUODENOSCOPY (EGD)   TO REMOVE FOREIGN BODY;  Surgeon: Lokesh Paula MD;    [facility]     ESOPHAGOSCOPY, GASTROSCOPY, DUODENOSCOPY (EGD), COMBINED N/A   8/4/2018    Procedure: COMBINED ESOPHAGOSCOPY, GASTROSCOPY, DUODENOSCOPY   (EGD), REMOVE FOREIGN BODY;   combined esophagoscopy,   gastroscopy, duodenoscopy, REMOVE FOREIGN BODY ;  Surgeon:   Lokesh Paula MD;  [facility]     ESOPHAGOSCOPY, GASTROSCOPY, DUODENOSCOPY (EGD), COMBINED N/A   10/6/2019    Procedure: ESOPHAGOGASTRODUODENOSCOPY (EGD) with fireign body   removal x2, esophageal stent placement with floroscopy;  Surgeon:   Timoteo Espana MD;  [facility]     ESOPHAGOSCOPY, GASTROSCOPY, DUODENOSCOPY (EGD), COMBINED N/A   12/2/2019    Procedure: Esophagogastroduodenoscopy with esophageal stent   removal, esophogram;  Surgeon: Kailee Tena MD;    [facility]     ESOPHAGOSCOPY, GASTROSCOPY, DUODENOSCOPY (EGD), COMBINED N/A   12/17/2019    Procedure: ESOPHAGOGASTRODUODENOSCOPY, WITH FOREIGN BODY   REMOVAL;  Surgeon: Pamela Perez MD;  [facility]     ESOPHAGOSCOPY, GASTROSCOPY, DUODENOSCOPY (EGD), COMBINED N/A   12/13/2019    Procedure: ESOPHAGOGASTRODUODENOSCOPY, WITH FOREIGN BODY   REMOVAL;  Surgeon: Samia Stanton MD;  [facility]     ESOPHAGOSCOPY, GASTROSCOPY, DUODENOSCOPY (EGD), COMBINED N/A   12/28/2019    Procedure: ESOPHAGOGASTRODUODENOSCOPY (EGD) Removal of Foreign   Body X 2;  Surgeon: Huy Kelley MD;  [facility]     ESOPHAGOSCOPY, GASTROSCOPY, DUODENOSCOPY (EGD), COMBINED N/A   1/5/2020    Procedure: ESOPHAGOGASTRODUOENOSCOPY WITH FOREIGN BODY REMOVAL;     Surgeon: Pamela Perez MD;  [facility]     ESOPHAGOSCOPY, GASTROSCOPY, DUODENOSCOPY (EGD), COMBINED N/A   1/3/2020    Procedure: ESOPHAGOGASTRODUODENOSCOPY (EGD) REMOVAL OF FOREIGN   BODY.;  Surgeon: Pamela Perez MD;  [facility]     ESOPHAGOSCOPY, GASTROSCOPY, DUODENOSCOPY (EGD), COMBINED N/A   1/13/2020    Procedure: ESOPHAGOGASTRODUODENOSCOPY (EGD) for foreign body   removal;  Surgeon: Lokesh Paula MD;  [facility]     ESOPHAGOSCOPY, GASTROSCOPY, DUODENOSCOPY (EGD), COMBINED N/A   1/18/2020    Procedure: Diagnostic ESOPHAGOGASTRODUODENOSCOPY (EGD;  Surgeon:   Lokesh Paula MD;  [facility]     ESOPHAGOSCOPY, GASTROSCOPY, DUODENOSCOPY (EGD), COMBINED N/A   3/29/2020    Procedure: UPPER ENDOSCOPY WITH FOREIGN BODY REMOVAL;  Surgeon:   Doug Hansen MD;  [facility]     ESOPHAGOSCOPY, GASTROSCOPY, DUODENOSCOPY (EGD), COMBINED N/A   7/11/2020    Procedure: ESOPHAGOGASTRODUODENOSCOPY (EGD); Upper Endoscopy   WITH FOREIGN BODY REMOVAL;  Surgeon: Lyndsey Mendoza DO;    [facility]     ESOPHAGOSCOPY, GASTROSCOPY, DUODENOSCOPY (EGD), COMBINED N/A   7/29/2020    Procedure: ESOPHAGOGASTRODUODENOSCOPY REMOVAL OF FOREIGN BODY;    Surgeon: Samia Stanton MD;  [facility]     ESOPHAGOSCOPY, GASTROSCOPY, DUODENOSCOPY (EGD), COMBINED N/A   8/1/2020    Procedure: ESOPHAGOGASTRODUODENOSCOPY, WITH FOREIGN BODY   REMOVAL;  Surgeon: Pamela Perez MD;  [facility]     ESOPHAGOSCOPY, GASTROSCOPY, DUODENOSCOPY (EGD), COMBINED N/A   8/18/2020    Procedure: ESOPHAGOGASTRODUODENOSCOPY (EGD) for foreign body   removal;  Surgeon: Pamela Perez MD;  [facility]     ESOPHAGOSCOPY, GASTROSCOPY, DUODENOSCOPY (EGD), COMBINED N/A   8/27/2020    Procedure: ESOPHAGOGASTRODUODENOSCOPY (EGD) with foreign body   removal;  Surgeon: Campbell Rogers MD;  [facility]     ESOPHAGOSCOPY, GASTROSCOPY,  DUODENOSCOPY (EGD), COMBINED N/A   9/18/2020    Procedure: ESOPHAGOGASTRODUODENOSCOPY (EGD) with foreign body   removal;  Surgeon: Dick Gillis MD;  [facility]     ESOPHAGOSCOPY, GASTROSCOPY, DUODENOSCOPY (EGD), COMBINED N/A   11/18/2020    Procedure: ESOPHAGOGASTRODUODENOSCOPY, WITH FOREIGN BODY   REMOVAL;  Surgeon: Felipe Ulloa DO;  [facility]     ESOPHAGOSCOPY, GASTROSCOPY, DUODENOSCOPY (EGD), COMBINED N/A   11/28/2020    Procedure: ESOPHAGOGASTRODUODENOSCOPY (EGD);  Surgeon: Campbell Rogers MD;  [facility]     ESOPHAGOSCOPY, GASTROSCOPY, DUODENOSCOPY (EGD), COMBINED N/A   3/12/2021    Procedure: ESOPHAGOGASTRODUODENOSCOPY, WITH FOREIGN BODY REMOVAL   using cold snare;  Surgeon: Marianna Rudolph MD;    [facility]     ESOPHAGOSCOPY, GASTROSCOPY, DUODENOSCOPY (EGD), COMBINED N/A   12/10/2017    Procedure: ESOPHAGOGASTRODUODENOSCOPY (EGD) with foreign body   removal;  Surgeon: Lila Sol MD;  Location: [facility];  Service:      ESOPHAGOSCOPY, GASTROSCOPY, DUODENOSCOPY (EGD), COMBINED N/A   2/13/2018    Procedure: ESOPHAGOGASTRODUODENOSCOPY (EGD);  Surgeon: Barney Pinto MD;  Location: [facility];  Service:      ESOPHAGOSCOPY, GASTROSCOPY, DUODENOSCOPY (EGD), COMBINED N/A   11/9/2018    Procedure: UPPER ENDOSCOPY, FOREIGN BODY REMOVAL;  Surgeon: Cristino Kelsey MD;  Location: [facility] OR;  Service:   Gastroenterology     ESOPHAGOSCOPY, GASTROSCOPY, DUODENOSCOPY (EGD), COMBINED N/A   11/17/2018    Procedure: ESOPHAGOGASTRODUODENOSCOPY (EGD) with foreign body   removal;  Surgeon: Gustavo Mathew MD;  Location: [facility];    Service: Gastroenterology     ESOPHAGOSCOPY, GASTROSCOPY, DUODENOSCOPY (EGD), COMBINED N/A   11/22/2018    Procedure: ESOPHAGOGASTRODUODENOSCOPY (EGD);  Surgeon: Binu Vigil MD;  Location: [facility];  Service:   Gastroenterology     ESOPHAGOSCOPY, GASTROSCOPY, DUODENOSCOPY (EGD), COMBINED  N/A   11/25/2018    Procedure: UPPER ENDOSCOPY TO REMOVE PAPER CLIPS;  Surgeon:   Hira Jacobs MD;  Location: [facility];  Service:   Gastroenterology     ESOPHAGOSCOPY, GASTROSCOPY, DUODENOSCOPY (EGD), COMBINED N/A   8/1/2021    Procedure: ESOPHAGOGASTRODUODENOSCOPY (EGD);  Surgeon: Binu Vigil MD;  Location: [facility]     ESOPHAGOSCOPY, GASTROSCOPY, DUODENOSCOPY (EGD), COMBINED N/A   7/31/2021    Procedure: ESOPHAGOGASTRODUODENOSCOPY (EGD);  Surgeon: Keith Quinn MD;  Location: [facility]     ESOPHAGOSCOPY, GASTROSCOPY, DUODENOSCOPY (EGD), COMBINED N/A   8/13/2021    Procedure: ESOPHAGOGASTRODUODENOSCOPY (EGD);  Surgeon: Gustavo Mathew MD;  Location: [facility]     ESOPHAGOSCOPY, GASTROSCOPY, DUODENOSCOPY (EGD), COMBINED N/A   8/13/2021    Procedure: ESOPHAGOGASTRODUODENOSCOPY (EGD) with foreign body   removal;  Surgeon: Gustavo Mathew MD;  Location: [facility]     ESOPHAGOSCOPY, GASTROSCOPY, DUODENOSCOPY (EGD), COMBINED N/A   1/30/2022    Procedure: ESOPHAGOGASTRODUODENOSCOPY (EGD) FOREIGN BODY   REMOVAL;  Surgeon: Bird Sethi MD;  Location: [facility]     ESOPHAGOSCOPY, GASTROSCOPY, DUODENOSCOPY (EGD), COMBINED N/A   2/3/2022    Procedure: ESOPHAGOGASTRODUODENOSCOPY (EGD), FOREIGN BODY   REMOVAL;  Surgeon: Binu Vigil MD;  Location: [facility]     ESOPHAGOSCOPY, GASTROSCOPY, DUODENOSCOPY (EGD), COMBINED N/A   2/7/2022    Procedure: ESOPHAGOGASTRODUODENOSCOPY (EGD) WITH FOREIGN BODY   REMOVAL;  Surgeon: Darek Mendoza MD;  Location:   [facility]     ESOPHAGOSCOPY, GASTROSCOPY, DUODENOSCOPY (EGD), COMBINED N/A   2/8/2022    Procedure: ESOPHAGOGASTRODUODENOSCOPY (EGD), foreign body   removal;  Surgeon: Lyndsey Mendoza DO;  [facility]     ESOPHAGOSCOPY, GASTROSCOPY, DUODENOSCOPY (EGD), DILATATION,   COMBINED N/A 8/30/2021    Procedure: ESOPHAGOGASTRODUODENOSCOPY, WITH DILATION (mngi);    Surgeon: Pat Cervantes  MD Meghan;  [facility]     EXAM UNDER ANESTHESIA ANUS N/A 1/10/2017    Procedure: EXAM UNDER ANESTHESIA ANUS;  Surgeon: Annmarie Haynes MD;  [facility]     EXAM UNDER ANESTHESIA RECTUM N/A 7/19/2018    Procedure: EXAM UNDER ANESTHESIA RECTUM;  EXAM UNDER ANESTHESIA,   REMOVAL OF RECTAL FOREIGN BODY;  Surgeon: Annmarie Haynes MD;  [facility]     HC REMOVE FECAL IMPACTION OR FB W ANESTHESIA N/A 12/18/2016    Procedure: REMOVE FECAL IMPACTION/FOREIGN BODY UNDER ANESTHESIA;    Surgeon: Soham Cano MD;  [facility]: [facility]     KNEE SURGERY Right      KNEE SURGERY - removed a small tissue mass from knee Right      LAPAROSCOPIC ABLATION ENDOMETRIOSIS       LAPAROTOMY EXPLORATORY N/A 1/10/2017    Procedure: LAPAROTOMY EXPLORATORY;  Surgeon: Annmarie Haynes MD;  [facility]     LAPAROTOMY EXPLORATORY  09/11/2019    Manual manipulation of bowel to remove pill bottle in rectum     lymph nodes removed from neck; benign  age 7     MAMMOPLASTY REDUCTION Bilateral      OTHER SURGICAL HISTORY      foreign body anus removal     WI ESOPHAGOGASTRODUODENOSCOPY TRANSORAL DIAGNOSTIC N/A 1/5/2019      Procedure: ESOPHAGOGASTRODUODENOSCOPY (EGD) with foreign body   removal using raptor;  Surgeon: Lila Sol MD;    Location: [facility];  Service: Gastroenterology     WI ESOPHAGOGASTRODUODENOSCOPY TRANSORAL DIAGNOSTIC N/A   1/25/2019    Procedure: ESOPHAGOGASTRODUODENOSCOPY (EGD) removal of foreign   body;  Surgeon: Binu Vigil MD;  Location: [facility] OR;  Service: Gastroenterology     WI ESOPHAGOGASTRODUODENOSCOPY TRANSORAL DIAGNOSTIC N/A   1/31/2019    Procedure: ESOPHAGOGASTRODUODENOSCOPY (EGD);  Surgeon:   Siddharth Spears MD;  Location: [facility] OR;    Service: Gastroenterology     WI ESOPHAGOGASTRODUODENOSCOPY TRANSORAL DIAGNOSTIC N/A   8/17/2019    Procedure: ESOPHAGOGASTRODUODENOSCOPY (EGD) with foreign body   removal;   Surgeon: Darek Lucero MD;  Location: [facility];  Service: Gastroenterology     SC ESOPHAGOGASTRODUODENOSCOPY TRANSORAL DIAGNOSTIC N/A   9/29/2019    Procedure: ESOPHAGOGASTRODUODENOSCOPY (EGD) with foreign body   removal;  Surgeon: Bailey Arnold MD;  Location: [facility];  Service: Gastroenterology     SC ESOPHAGOGASTRODUODENOSCOPY TRANSORAL DIAGNOSTIC N/A   10/3/2019    Procedure: ESOPHAGOGASTRODUODENOSCOPY (EGD), REMOVAL OF FOREIGN   BODY;  Surgeon: Chris Lira MD;  Location: [facility]   OR;  Service: Gastroenterology     SC ESOPHAGOGASTRODUODENOSCOPY TRANSORAL DIAGNOSTIC N/A   10/6/2019    Procedure: ESOPHAGOGASTRODUODENOSCOPY (EGD) with attempted   foreign body removal;  Surgeon: Felipe Connolly MD;  Location:   [facility];  Service: Gastroenterology     SC ESOPHAGOGASTRODUODENOSCOPY TRANSORAL DIAGNOSTIC N/A   12/15/2019    Procedure: ESOPHAGOGASTRODUODENOSCOPY (EGD) with foreign body   removal;  Surgeon: Jeffy Zuñiga MD;  Location: [facility];  Service: Gastroenterology     SC ESOPHAGOGASTRODUODENOSCOPY TRANSORAL DIAGNOSTIC N/A   12/17/2019    Procedure: ESOPHAGOGASTRODUODENOSCOPY (EGD) with attempted   foreign body removal;  Surgeon: Felipe Connolly MD;  Location:   [facility];  Service: Gastroenterology     SC ESOPHAGOGASTRODUODENOSCOPY TRANSORAL DIAGNOSTIC N/A   12/21/2019    Procedure: ESOPHAGOGASTRODUODENOSCOPY (EGD) FOR FROEIGN BODY   REMOVAL;  Surgeon: Binu Vigil MD;  Location: [facility];  Service: Gastroenterology     SC ESOPHAGOGASTRODUODENOSCOPY TRANSORAL DIAGNOSTIC N/A   7/22/2020    Procedure: ESOPHAGOGASTRODUODENOSCOPY (EGD);  Surgeon:   Bailey Arnold MD;  Location: [facility] OR;    Service: Gastroenterology     SC ESOPHAGOGASTRODUODENOSCOPY TRANSORAL DIAGNOSTIC N/A   8/14/2020    Procedure: ESOPHAGOGASTRODUODENOSCOPY (EGD) FOREIGN BODY   REMOVAL;  Surgeon: Jeffy Zuñiga MD;  [facility]: [facility] OR;  Service: Gastroenterology     AK ESOPHAGOGASTRODUODENOSCOPY TRANSORAL DIAGNOSTIC N/A   2/25/2021    Procedure: ESOPHAGOGASTRODUODENOSCOPY (EGD) with foreign body   reoval;  Surgeon: Bird Sethi MD;  Location: [facility];    Service: Gastroenterology     AK ESOPHAGOGASTRODUODENOSCOPY TRANSORAL DIAGNOSTIC N/A   4/19/2021    Procedure: ESOPHAGOGASTRODUODENOSCOPY (EGD);  Surgeon: Libia Rose MD;  Location: [facility] OR;  Service:   Gastroenterology     AK SURG DIAGNOSTIC EXAM, ANORECTAL N/A 2/5/2020    Procedure: EXAM UNDER ANESTHESIA, Flexible Sigmoidoscopy,   Retrieval of Foreign Body;  Surgeon: Sasha Ivan MD;  Location:   [facility] OR;  Service: General     RELEASE CARPAL TUNNEL Bilateral      RELEASE CARPAL TUNNEL Bilateral      REMOVAL, FOREIGN BODY, RECTUM N/A 7/21/2021    Procedure: MANUAL RETREIVALOF FOREIGN OBJECT- RECTUM.;  Surgeon:   Aleksandra Gerber MD;  Location[facility] OR     SIGMOIDOSCOPY FLEXIBLE N/A 1/10/2017    Procedure: SIGMOIDOSCOPY FLEXIBLE;  Surgeon: Annmarie Haynes MD;  Location:  OR     SIGMOIDOSCOPY FLEXIBLE N/A 5/8/2018    Procedure: SIGMOIDOSCOPY FLEXIBLE;  flex sig with foreign body   removal using snare and rattooth forcep;  Surgeon: Soham Cano MD;  Location: [facility]     SIGMOIDOSCOPY FLEXIBLE N/A 2/20/2019    Procedure: Exam under anesthesia Colonoscopy with attempted    removal of rectal foreign body;  Surgeon: Estrada Chávez MD;    Location: [facility]       Social History   I have reviewed this patient's social history and updated it with   pertinent information if needed.  Social History     Tobacco Use     Smoking status: Never Smoker     Smokeless tobacco: Never Used   Vaping Use     Vaping Use: None   Substance Use Topics     Alcohol use: No     Alcohol/week: 0.0 standard drinks     Drug use: No       Family History   I have reviewed this patient's family history and updated it with   pertinent information if  "needed.  Family History   Problem Relation Age of Onset     Diabetes Type 2  Maternal Grandmother      Diabetes Type 2  Paternal Grandmother      Breast Cancer Paternal Grandmother      Cerebrovascular Disease Father 53     Myocardial Infarction No family hx of      Coronary Artery Disease Early Onset No family hx of      Ovarian Cancer No family hx of      Colon Cancer No family hx of      Depression Mother      Anxiety Disorder Mother        Medications   I have reviewed this patient's current medications    Allergies   Allergies   Allergen Reactions     Amoxicillin-Pot Clavulanate Other (See Comments), Swelling and   Rash     PN: facial swelling, left side. Also had cortisone injection   the same day as she started the Augmentin.  Noted in downtime recovery of chart.    PN: facial swelling, left side. Also had cortisone injection the   same day as she started the Augmentin.; HUT Comment: PN: facial   swelling, left side. Also had cortisone injection the same day as   she started the Augmentin.Noted in downtime recovery of chart.;   HUT Reaction: Rash; HUT Reaction: Unknown; HUT Reaction Type:   Allergy; HUT Severity: Med; HUT Noted: [id number]     Oseltamivir Hives     med stopped, PN: med stopped  med stopped, PN: med stopped; HUT Comment: med stopped, PN: med   [Narrative was truncated due to length]   Ethics IP Consult     Status: None ()    Narrative    Royer Montgomery DO     2/14/2022  6:14 PM  Ethics Consultation      Date: 2/14/2022     Time:  1515     Attending physician: Marian Ledesma     Consult requested by: Dr. Ana Laura Paz     Reason for consult: \"Recurrent foreign body ingestions, suspicion   for secondary gain. GI requests ethics consult prior to   proceeding with endoscopy.\"     Participants in Consult:        Landon Temple, Damian Orosco, Royer Montgomery- ethics    Dr. Ana Laura Paz, Dr. Samia Stanton- GI     Dr. Yolanda Alonzo- psychiatry     Decisional capacity: impaired, has guardian   "   Advance directive:  On File      HCA: Active- Guardian      Code status: Full Code     Background: (Medical)     - 31 year old female with history of depression, anxiety,   borderline personality disorder, ADD, PTSD, eating disorder,   factitious disorder, and self-injurious behavior including   frequent ingestion of foreign bodies.  - Admitted to inpatient psychiatry after ingesting metal wires   from face masks s/p EGD- recent procedures 2/7 and 2/8.   - Noted to have numerous (over 50 in [facility]) EGDs in last 3 years  - Now with repeat ingestion of 7.5 cm plastic spoon handle on   2/13. Per GI, low risk of 7.5 cm foreign body passing to small   bowel and low risk of gastric perforation at this time.      Social:       lives at [facility] in [place], since March 2021    Has [facility]      Ethical Issue:      - Appropriateness of intervention for a patient with potential   factitious disorder and may be inappropriately seeking medical   intervention      Ethics Analysis:      - Does this treatment have opportunity for benefit, or do the   risks/burdens outweigh any of the opportunities for benefits    When weighing the risks and benefits of treatment (EGD), there is   definitive benefit of removing the foreign object.  Certainly   there are increased risks of repeated EGDs per GI.  Further,   repeat EGDs can have an incremental increase risk, and the party   giving consent should be aware of the potential increased risks   of subsequent procedures in the informed consent process.    However, there are harms of not providing medical intervention,   which are far greater than the harms providing this intervention.    Although EDGs are not treating the underlying disease process   directly, it does allow the opportunity for the patient to   continue receiving care with the psychiatric team and participate   in the therapeutic milieu.    We also recognize that the patient may be seeing certain  aspects   of procedures or treatments including sedation.  It is ethically   defensible to limit and mitigate some of these incentives.  For   example, one may be able to limit the amount or time of sedation   if this does not pose risk to the patient or compromise the   success of the procedure.       Recommendations:      - Proceed with EGD per MASON Montgomery DO, MA  Pronouns: he/him/his  Clinical Ethicist - [facility]     -  Department of Family Medicine and   [facility]      Please contact the service if we can be of any further   assistance, service pager [phone].   Urine Drugs of Abuse Screen     Status: Normal    Narrative    The following orders were created for panel order Urine Drugs of Abuse Screen.  Procedure                               Abnormality         Status                     ---------                               -----------         ------                     Drug abuse screen 1 urin...[[id number]]  Normal              Final result                 Please view results for these tests on the individual orders.   CBC with platelets differential     Status: Abnormal    Narrative    The following orders were created for panel order CBC with platelets differential.  Procedure                               Abnormality         Status                     ---------                               -----------         ------                     CBC with platelets and d...[[id number]]  Abnormal            Final result                 Please view results for these tests on the individual orders.   Extra Tube     Status: None    Narrative    The following orders were created for panel order Extra Tube.  Procedure                               Abnormality         Status                     ---------                               -----------         ------                     Extra Blue Top Tube[[id number]]                              Final result                  Please view results for these tests on the individual orders.   Extra Tube     Status: None    Narrative    The following orders were created for panel order Extra Tube.  Procedure                               Abnormality         Status                     ---------                               -----------         ------                     Extra Red Top Tube[[id number]]                                                          Extra Green Top (Lithium...[[id number]]                                                 Extra Purple Top Tube[[id number]]                            Final result                 Please view results for these tests on the individual orders.

## 2022-02-17 VITALS
SYSTOLIC BLOOD PRESSURE: 134 MMHG | HEIGHT: 62 IN | BODY MASS INDEX: 52.65 KG/M2 | HEART RATE: 82 BPM | TEMPERATURE: 97.7 F | OXYGEN SATURATION: 94 % | DIASTOLIC BLOOD PRESSURE: 79 MMHG | RESPIRATION RATE: 18 BRPM | WEIGHT: 286.1 LBS

## 2022-02-17 PROBLEM — T18.9XXA SWALLOWED FOREIGN BODY, INITIAL ENCOUNTER: Status: ACTIVE | Noted: 2020-01-12

## 2022-02-17 PROBLEM — Z91.52 HISTORY OF NON-SUICIDAL SELF-HARM: Status: ACTIVE | Noted: 2017-11-25

## 2022-02-17 PROCEDURE — 250N000013 HC RX MED GY IP 250 OP 250 PS 637: Performed by: STUDENT IN AN ORGANIZED HEALTH CARE EDUCATION/TRAINING PROGRAM

## 2022-02-17 PROCEDURE — 99238 HOSP IP/OBS DSCHRG MGMT 30/<: CPT | Mod: GC | Performed by: PSYCHIATRY & NEUROLOGY

## 2022-02-17 RX ORDER — ACETAMINOPHEN 500 MG
500-1000 TABLET ORAL EVERY 6 HOURS PRN
Qty: 15 TABLET | Refills: 0 | Status: SHIPPED | OUTPATIENT
Start: 2022-02-17 | End: 2022-02-17

## 2022-02-17 RX ORDER — ACETAMINOPHEN 500 MG
500-1000 TABLET ORAL EVERY 6 HOURS PRN
Qty: 15 TABLET | Refills: 0
Start: 2022-02-17 | End: 2022-07-09

## 2022-02-17 RX ADMIN — HYDROXYCHLOROQUINE SULFATE 200 MG: 200 TABLET, FILM COATED ORAL at 07:52

## 2022-02-17 RX ADMIN — PROPRANOLOL HYDROCHLORIDE 80 MG: 80 CAPSULE, EXTENDED RELEASE ORAL at 07:53

## 2022-02-17 RX ADMIN — PREGABALIN 100 MG: 100 CAPSULE ORAL at 07:53

## 2022-02-17 RX ADMIN — FERROUS SULFATE TAB 325 MG (65 MG ELEMENTAL FE) 325 MG: 325 (65 FE) TAB at 07:53

## 2022-02-17 RX ADMIN — BUSPIRONE HYDROCHLORIDE 20 MG: 10 TABLET ORAL at 07:53

## 2022-02-17 RX ADMIN — DESVENLAFAXINE SUCCINATE 100 MG: 100 TABLET, EXTENDED RELEASE ORAL at 07:52

## 2022-02-17 RX ADMIN — Medication 2000 UNITS: at 07:53

## 2022-02-17 ASSESSMENT — ACTIVITIES OF DAILY LIVING (ADL)
ORAL_HYGIENE: INDEPENDENT
HYGIENE/GROOMING: INDEPENDENT
DRESS: INDEPENDENT

## 2022-02-17 NOTE — PLAN OF CARE
Problem: Adult Inpatient Plan of Care  Goal: Optimal Comfort and Wellbeing  Outcome: Ongoing, Progressing  Goal: Readiness for Transition of Care  Outcome: Ongoing, Progressing    Patient continues on 2:1 SIO today for risk of SIB.  She denies SI and SIB currently and is agreeable to let staff know if she feels SIB urges this shift.  Patient states that her goal for today is to not engage in SIB.  Patient endorses anxiety rated at 3/10 and states this is good for her.  States that PRN medication that she took earlier today was effective in decreasing anxiety.  Patient endorses low depression, rating it at 1/10. Patient is up in milieu this evening.  She expresses frustration with needing to eat in her room when she is able to be up in milieu outside of meal time.  Patient is medication compliant and remains safe on unit.  Will continue to monitor.  Lou White RN

## 2022-02-17 NOTE — PLAN OF CARE
Goal Outcome Evaluation:    Plan of Care Reviewed With: patient     Overall Patient Progress: improving    Pt appeared to have slept for 7 hours  during shift.Vital signs: Temperature 97.6, pulse 77, RR 18, /75, 02 sats 98% on RA. She slept for the most part of the shift, she denies pain in her LUQ abdominal region. No PRN medication was given. Throughout the shift, pt was assessed, no distress was noted. Staff will continue to offer support to pt.

## 2022-02-17 NOTE — PLAN OF CARE
Goal Outcome Evaluation:    Plan of Care Reviewed With: patient     Overall Patient Progress: improving    Outcome Evaluation: patient only received toradol for postop pain, stable for transition back to [facility] care    Assessment/Intervention/Current Symptoms and Care Coordination: Met with team. Reviewed patient's chart and progress notes. Writer called [employer] at [phone]. Writer scheduled  transportation services for 12:00 PM. Transportation services will be [employer]. Writer scheduled MH and PCP appointments. Writer called patient's care coordinator to provide update on Writer checked with patient to discuss aftercare plan and discharge. Writer emailed patient's outpatient team to update them on discharge plan and follow up appointments. Writer completed AVS.      Discharge Plan or Goal: Will discharged back to group home. Will follow up with outpatient treatment team.         Barriers to Discharge: Denies SI/SIB/HI. Ready to discharge           Referral Status:           Legal Status: Has Legal Guardian

## 2022-02-17 NOTE — PLAN OF CARE
Alert and orientated x 3.  Affect flat and blunted.  Reports low anxiety and looking forward to discharge back to her group home.  Denies suicidal thoughts and thoughts of self harm.  Endorses ability to notify staff if she has any self harm urges.  Remained on 2:1, SIO for patient safety until discharged at 12:00.  Has been pleasant and cooperative. Received written discharge instructions, with verbal overview of instructions.  All questions answered and verbalizes understanding.  Per CTC, guardian agrees with discharge plans and informed of discharge instructions.   All belongings returned to patient.  Discharge to group home at 12:00.    Problem: Adult Inpatient Plan of Care  Goal: Plan of Care Review  Outcome: Adequate for Care Transition  Flowsheets (Taken 2/17/2022 1136)  Plan of Care Reviewed With: patient  Goal: Patient-Specific Goal (Individualized)  Outcome: Adequate for Care Transition  Goal: Absence of Hospital-Acquired Illness or Injury  Outcome: Adequate for Care Transition  Goal: Optimal Comfort and Wellbeing  Outcome: Adequate for Care Transition  Intervention: Provide Person-Centered Care  Recent Flowsheet Documentation  Taken 2/17/2022 1136 by Jayne Witt RN  Trust Relationship/Rapport: questions answered  Goal: Readiness for Transition of Care  Outcome: Adequate for Care Transition     Problem: Behavioral Health Plan of Care  Goal: Plan of Care Review  Outcome: Adequate for Care Transition  Flowsheets (Taken 2/17/2022 1136)  Plan of Care Reviewed With: patient  Patient Agreement with Plan of Care: agrees  Goal: Patient-Specific Goal (Individualization)  Outcome: Adequate for Care Transition  Note:     Goal: Adheres to Safety Considerations for Self and Others  Outcome: Adequate for Care Transition  Goal: Absence of New-Onset Illness or Injury  Outcome: Adequate for Care Transition  Goal: Optimized Coping Skills in Response to Life Stressors  Outcome: Adequate for Care Transition  Goal:  Develops/Participates in Therapeutic [place] to Support Successful Transition  Outcome: Adequate for Care Transition  Intervention: Foster Therapeutic [place]  Recent Flowsheet Documentation  Taken 2/17/2022 1136 by Jayne Witt RN  Trust Relationship/Rapport: questions answered     Problem: Suicidal Behavior  Goal: Suicidal Behavior is Absent or Managed  Outcome: Adequate for Care Transition     Problem: Activity and Energy Impairment (Anxiety Signs/Symptoms)  Goal: Optimized Energy Level (Anxiety Signs/Symptoms)  Outcome: Adequate for Care Transition     Problem: Cognitive Impairment (Anxiety Signs/Symptoms)  Goal: Optimized Cognitive Function (Anxiety Signs/Symptoms)  Outcome: Adequate for Care Transition     Problem: Mood Impairment (Anxiety Signs/Symptoms)  Goal: Improved Mood Symptoms (Anxiety Signs/Symptoms)  Outcome: Adequate for Care Transition     Problem: Sleep Disturbance (Anxiety Signs/Symptoms)  Goal: Improved Sleep (Anxiety Signs/Symptoms)  Outcome: Adequate for Care Transition     Problem: Social, Occupational or Functional Impairment (Anxiety Signs/Symptoms)  Goal: Enhanced Social, Occupational or Functional Skills (Anxiety Signs/Symptoms)  Outcome: Adequate for Care Transition  Intervention: Promote Social, Occupational and Functional Ability  Recent Flowsheet Documentation  Taken 2/17/2022 1136 by Jayne Witt RN  Trust Relationship/Rapport: questions answered     Problem: Somatic Disturbance (Anxiety Signs/Symptoms)  Goal: Improved Somatic Symptoms (Anxiety Signs/Symptoms)  Outcome: Adequate for Care Transition   Goal Outcome Evaluation:    Plan of Care Reviewed With: patient     Overall Patient Progress: improving    Outcome Evaluation: patient only received toradol for postop pain, stable for transition back to [facility]

## 2022-02-17 NOTE — DISCHARGE INSTRUCTIONS
Behavioral Discharge Planning and Instructions    Summary: You were admitted on 2/8/2022  due to Worsening depression, SA.  You were treated by Dr. Ledesma and discharged on 2/17/2022 from [facility]    Main Diagnosis: MDD, PTSD  and Borderline Personality Disorder, factitious disorder    Health Care Follow-up:     [facility]  Appointment Date/Time: 2/18/2022 at 10:30 AM. This will be a virtual appointment      Psychiatrist: Linda De La Rosa,      Address: [address] [place] [address]     Phone Number:  [phone]      [facility]     Appointment Date/Time: 2/23/2020 at 11:40 AM. This will be an in person appointment      Primary Care Provider: Latonya Knight MD      Address: [address] [place] [address]        Phone Number: [phone]       Other Provider:  [facility] Pilar [facility]  Legal guardian: Aaliyah Martell, [phone]   : Julisa Lee [phone]   [facility] Care Coordinator: Erna at [phone] Ext. [phone] Fax [phone]      Attend all scheduled appointments with your outpatient providers. Call at least 24 hours in advance if you need to reschedule an appointment to ensure continued access to your outpatient providers.     Major Treatments, Procedures and Findings:  You were provided with: a psychiatric assessment, assessed for medical stability, medication evaluation and/or management, group therapy, milieu management and medical interventions    Symptoms to Report: feeling more aggressive, increased confusion, losing more sleep, mood getting worse or thoughts of suicide    Early warning signs can include: increased depression or anxiety sleep disturbances increased thoughts or behaviors of suicide or self-harm  increased unusual thinking, such as paranoia or hearing voices    Safety and Wellness:  Take all  "medicines as directed.  Make no changes unless your doctor suggests them.  Follow treatment recommendations.  Refrain from alcohol and non-prescribed drugs.  Ask your support system to help you reduce your access to items that could harm yourself or others. If there is a concern for safety, call 911.    DBT Resources:  [facility]  Phone: (1-144.188.6927)    [facility]  Address: [address], [place] [address]  Phone: [phone]    [facility]  Address: [address], [place] [address]  Phone: [phone]    [facility],   Address: [address], [place] [address]  Phone: [phone]    [facility]  Address: [address], [place] [address]  Phone: [phone]    [facility]  Address: [address], [place] [address]  Phone: [phone]    DBT with [facility]  Phone: [phone]    Resources:   Crisis Intervention: 709.557.6940 or 829-390-2520 (TTY: 195.412.8757).  Call anytime for help.  [employer] [place] on Mental Illness (www.[place].darnell.org): 842.522.5550 or 765-814-4997.  Suicide Awareness Voices of Education (SAVE) (www.save.org): 223-363-JRDZ (2555)  National Suicide Prevention Line (www.mentalhealthmn.org): 550-407-QCWV (7197)  Mental Health Consumer/Survivor Network of [place] (www.mhcsn.net): 541.904.5925 or 351-181-4181  Mental Health Association of [place] (www.mentalhealth.org): 436.465.4449 or 385-475-7395  [facility] Crisis Response 916-332-0486  [facility] Crisis (COPE) Response - Adult [phone]  [facility] Crisis Response - Adult [phone]  [facility] Rapid Response [phone]  Text 4 Life: txt \"LIFE\" to 07044 for immediate support and crisis intervention  Crisis text line: Text \"[place]\" to 919146. Free, confidential, 24/7.    General Medication Instructions:   See your medication sheet(s) for instructions.   Take " all medicines as directed.  Make no changes unless your doctor suggests them.   Go to all your doctor visits.  Be sure to have all your required lab tests. This way, your medicines can be refilled on time.  Do not use any drugs not prescribed by your doctor.  Avoid alcohol.    Advance Directives:   Scanned document on file with [place]? Yes, scanned ACP docmt  Is document scanned? Pt states no documents  Honoring Choices Your Rights Handout: Informed and given  Was more information offered? Pt declined    The Treatment team has appreciated the opportunity to work with you. If you have any questions or concerns about your recent admission, you can contact the unit which can receive your call 24 hours a day, 7 days a week. They will be able to get in touch with a Provider if needed. The unit number is [phone] .          [facility], [place] // Same-Day Surgery // Adult Discharge Orders & Instructions     Take it easy when you get home.  Remember, same day surgery DOES NOT MEAN SAME DAY RECOVERY! Healing is a gradual process.   You will need some time to recover- you may be more tired than you realize at first.  Rest and relax for at least the first 24 hours at home.  You'll feel better and heal faster if you take good care of yourself.     ANESTHESIA RECOVERY -   For 24 hours after surgery    1. Get plenty of rest.  A responsible adult must stay with you for at least 24 hours after you leave the hospital.   2. Do not drive or use heavy equipment.  If you have weakness or tingling, don't drive or use heavy equipment until this feeling goes away.  3. Do not drink alcohol.  4. Avoid strenuous or risky activities.  Ask for help when climbing stairs.   5. You may feel lightheaded.  IF so, sit for a few minutes before standing.  Have someone help you get up.   6. If you have nausea (feel sick to your stomach): Drink only clear liquids such as apple juice, ginger ale, broth or 7-Up.  Rest  may also help.  Be sure to drink enough fluids.  Move to a regular diet as you feel able.  7. You may have a slight fever. Call the doctor if your fever is over 100 F (37.7 C) (taken under the tongue) or lasts longer than 24 hours.  8. You may have a dry mouth, a sore throat, muscle aches or trouble sleeping.  These should go away after 24 hours.  9. Do not make important or legal decisions.     Call your doctor for any of the followin.  Signs of infection (fever, growing tenderness at the surgery site, a large amount of drainage or bleeding, severe pain, foul-smelling drainage, redness, swelling).  2. It has been over 8 to 10 hours since surgery and you are still not able to urinate (pass water).  3.  Headache for over 24 hours.    To contact a doctor, call:    Dr Stanton's clinic at [phone] and ask for the resident on call for Gastroenterology (answered 24 hours a day)    Emergency Department: [facility]: [phone] (TTY for hearing impaired: [phone])

## 2022-04-17 ENCOUNTER — HEALTH MAINTENANCE LETTER (OUTPATIENT)
Age: 32
End: 2022-04-17

## 2022-04-21 ENCOUNTER — HOSPITAL ENCOUNTER (EMERGENCY)
Facility: HOSPITAL | Age: 32
Discharge: HOME | End: 2022-04-22
Attending: EMERGENCY MEDICINE | Admitting: EMERGENCY MEDICINE
Payer: COMMERCIAL

## 2022-04-21 ENCOUNTER — APPOINTMENT (OUTPATIENT)
Dept: RADIOLOGY | Facility: HOSPITAL | Age: 32
End: 2022-04-21
Attending: STUDENT IN AN ORGANIZED HEALTH CARE EDUCATION/TRAINING PROGRAM
Payer: COMMERCIAL

## 2022-04-21 VITALS
SYSTOLIC BLOOD PRESSURE: 141 MMHG | TEMPERATURE: 98 F | HEART RATE: 81 BPM | DIASTOLIC BLOOD PRESSURE: 83 MMHG | BODY MASS INDEX: 47.74 KG/M2 | WEIGHT: 261 LBS | RESPIRATION RATE: 16 BRPM | OXYGEN SATURATION: 97 %
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
## 2022-04-21 DIAGNOSIS — M54.2 NECK PAIN: ICD-10-CM

## 2022-04-21 PROCEDURE — 99284 EMERGENCY DEPT VISIT MOD MDM: CPT

## 2022-04-21 PROCEDURE — 72040 X-RAY EXAM NECK SPINE 2-3 VW: CPT

## 2022-04-21 RX ORDER — ACETAMINOPHEN 325 MG/1
975 TABLET ORAL ONCE
Status: COMPLETED | OUTPATIENT
Start: 2022-04-22 | End: 2022-04-22

## 2022-04-22 LAB
ATRIAL RATE - MUSE: 78 BPM
DIASTOLIC BLOOD PRESSURE - MUSE: NORMAL MMHG
INTERPRETATION ECG - MUSE: NORMAL
P AXIS - MUSE: 47 DEGREES
PR INTERVAL - MUSE: 166 MS
QRS DURATION - MUSE: 78 MS
QT - MUSE: 394 MS
QTC - MUSE: 449 MS
R AXIS - MUSE: 70 DEGREES
SYSTOLIC BLOOD PRESSURE - MUSE: NORMAL MMHG
T AXIS - MUSE: 42 DEGREES
VENTRICULAR RATE- MUSE: 78 BPM

## 2022-04-22 PROCEDURE — 93005 ELECTROCARDIOGRAM TRACING: CPT | Performed by: EMERGENCY MEDICINE

## 2022-04-22 PROCEDURE — 250N000013 HC RX MED GY IP 250 OP 250 PS 637: Performed by: EMERGENCY MEDICINE

## 2022-04-22 RX ADMIN — ACETAMINOPHEN 975 MG: 325 TABLET ORAL at 00:05

## 2022-04-22 ASSESSMENT — ENCOUNTER SYMPTOMS
CHILLS: 0
DYSURIA: 0
NUMBNESS: 0
DIZZINESS: 0
JOINT SWELLING: 0
BLOOD IN STOOL: 0
ABDOMINAL PAIN: 0
FEVER: 0
LIGHT-HEADEDNESS: 1
DIARRHEA: 0
VOMITING: 0
NAUSEA: 0
SHORTNESS OF BREATH: 0
HEMATURIA: 0
CHEST TIGHTNESS: 0
WEAKNESS: 0
CONFUSION: 0
SORE THROAT: 0

## 2022-04-22 NOTE — ED PROVIDER NOTES
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 direction.      Nish West DO  Emergency Medicine  [facility] EMERGENCY DEPARTMENT  4/21/2022  11:46 PM        Nish West MD  04/22/22 0108

## 2022-04-22 NOTE — DISCHARGE INSTRUCTIONS
Xrays and EKG all reassuring. Follow up with primary clinic. Ibuprofen/tylenol for pain.  Lightly stretch and use ice or heat for 15-20 minutes at a time.
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
## 2022-06-07 NOTE — ED AVS SNAPSHOT
[facility], [place], Emergency Department  [address] [place] [id number]  Phone:  [phone]                                    Nevin Alvarado   MRN: [id number]    Department:  [facility], Emergency Department   Date of Visit:  9/8/2020           After Visit Summary Signature Page    I have received my discharge instructions, and my questions have been answered. I have discussed any challenges I see with this plan with the nurse or doctor.    ..........................................................................................................................................  Patient/Patient Representative Signature      ..........................................................................................................................................  Patient Representative Print Name and Relationship to Patient    ..................................................               ................................................  Date                                   Time    ..........................................................................................................................................  Reviewed by Signature/Title    ...................................................              ..............................................  Date                                               Time          22EPIC Rev 08/18       
[id number]

## 2022-07-01 NOTE — OR NURSING
Pt to PACU from the OR. Dr. Perez at bedside assessing Pt and explained what happened. Vitals stable. Pt stable.   5

## 2022-07-05 ENCOUNTER — HOSPITAL ENCOUNTER (EMERGENCY)
Facility: CLINIC | Age: 32
Discharge: HOME | End: 2022-07-05
Attending: EMERGENCY MEDICINE | Admitting: EMERGENCY MEDICINE
Payer: COMMERCIAL

## 2022-07-05 ENCOUNTER — APPOINTMENT (OUTPATIENT)
Dept: CT IMAGING | Facility: CLINIC | Age: 32
End: 2022-07-05
Attending: EMERGENCY MEDICINE
Payer: COMMERCIAL

## 2022-07-05 VITALS
TEMPERATURE: 97.6 F | HEIGHT: 62 IN | HEART RATE: 106 BPM | SYSTOLIC BLOOD PRESSURE: 129 MMHG | RESPIRATION RATE: 16 BRPM | BODY MASS INDEX: 48.03 KG/M2 | OXYGEN SATURATION: 98 % | WEIGHT: 261 LBS | DIASTOLIC BLOOD PRESSURE: 62 MMHG

## 2022-07-05 DIAGNOSIS — M54.2 NECK PAIN: ICD-10-CM

## 2022-07-05 DIAGNOSIS — S09.90XA TRAUMATIC INJURY OF HEAD, INITIAL ENCOUNTER: ICD-10-CM
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
## 2022-07-05 DIAGNOSIS — M54.6 ACUTE MIDLINE THORACIC BACK PAIN: ICD-10-CM

## 2022-07-05 LAB
HCG SERPL QL: NEGATIVE
HOLD SPECIMEN: NORMAL

## 2022-07-05 PROCEDURE — 72125 CT NECK SPINE W/O DYE: CPT | Mod: 26 | Performed by: RADIOLOGY

## 2022-07-05 PROCEDURE — 99284 EMERGENCY DEPT VISIT MOD MDM: CPT | Performed by: EMERGENCY MEDICINE

## 2022-07-05 PROCEDURE — 84703 CHORIONIC GONADOTROPIN ASSAY: CPT | Performed by: EMERGENCY MEDICINE

## 2022-07-05 PROCEDURE — 99285 EMERGENCY DEPT VISIT HI MDM: CPT | Mod: 25 | Performed by: EMERGENCY MEDICINE

## 2022-07-05 PROCEDURE — G1010 CDSM STANSON: HCPCS | Mod: GC | Performed by: RADIOLOGY

## 2022-07-05 PROCEDURE — G1010 CDSM STANSON: HCPCS

## 2022-07-05 PROCEDURE — 96375 TX/PRO/DX INJ NEW DRUG ADDON: CPT | Performed by: EMERGENCY MEDICINE

## 2022-07-05 PROCEDURE — 36415 COLL VENOUS BLD VENIPUNCTURE: CPT | Performed by: EMERGENCY MEDICINE

## 2022-07-05 PROCEDURE — 96361 HYDRATE IV INFUSION ADD-ON: CPT | Performed by: EMERGENCY MEDICINE

## 2022-07-05 PROCEDURE — 96374 THER/PROPH/DIAG INJ IV PUSH: CPT | Performed by: EMERGENCY MEDICINE

## 2022-07-05 PROCEDURE — 72128 CT CHEST SPINE W/O DYE: CPT | Mod: 26 | Performed by: RADIOLOGY

## 2022-07-05 PROCEDURE — 258N000003 HC RX IP 258 OP 636: Performed by: EMERGENCY MEDICINE

## 2022-07-05 PROCEDURE — 96376 TX/PRO/DX INJ SAME DRUG ADON: CPT | Performed by: EMERGENCY MEDICINE

## 2022-07-05 PROCEDURE — 70450 CT HEAD/BRAIN W/O DYE: CPT | Mod: 26 | Performed by: RADIOLOGY

## 2022-07-05 PROCEDURE — 250N000011 HC RX IP 250 OP 636: Performed by: EMERGENCY MEDICINE

## 2022-07-05 RX ORDER — SODIUM CHLORIDE 9 MG/ML
INJECTION, SOLUTION INTRAVENOUS CONTINUOUS
Status: DISCONTINUED | OUTPATIENT
Start: 2022-07-05 | End: 2022-07-06 | Stop reason: HOSPADM

## 2022-07-05 RX ORDER — KETOROLAC TROMETHAMINE 15 MG/ML
15 INJECTION, SOLUTION INTRAMUSCULAR; INTRAVENOUS ONCE
Status: COMPLETED | OUTPATIENT
Start: 2022-07-05 | End: 2022-07-05

## 2022-07-05 RX ORDER — ONDANSETRON 2 MG/ML
4 INJECTION INTRAMUSCULAR; INTRAVENOUS EVERY 30 MIN PRN
Status: DISCONTINUED | OUTPATIENT
Start: 2022-07-05 | End: 2022-07-06 | Stop reason: HOSPADM

## 2022-07-05 RX ADMIN — ONDANSETRON 4 MG: 2 INJECTION INTRAMUSCULAR; INTRAVENOUS at 20:15

## 2022-07-05 RX ADMIN — KETOROLAC TROMETHAMINE 15 MG: 15 INJECTION, SOLUTION INTRAMUSCULAR; INTRAVENOUS at 20:15

## 2022-07-05 RX ADMIN — KETOROLAC TROMETHAMINE 15 MG: 15 INJECTION, SOLUTION INTRAMUSCULAR; INTRAVENOUS at 19:02

## 2022-07-05 RX ADMIN — ONDANSETRON 4 MG: 2 INJECTION INTRAMUSCULAR; INTRAVENOUS at 19:02

## 2022-07-05 RX ADMIN — SODIUM CHLORIDE 1000 ML: 9 INJECTION, SOLUTION INTRAVENOUS at 19:02
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
## 2022-07-09 ENCOUNTER — HOSPITAL ENCOUNTER (OUTPATIENT)
Facility: CLINIC | Age: 32
Discharge: HOME | End: 2022-07-09
Attending: INTERNAL MEDICINE | Admitting: INTERNAL MEDICINE
Payer: COMMERCIAL

## 2022-07-09 ENCOUNTER — APPOINTMENT (OUTPATIENT)
Dept: GENERAL RADIOLOGY | Facility: CLINIC | Age: 32
End: 2022-07-09
Attending: INTERNAL MEDICINE
Payer: COMMERCIAL

## 2022-07-09 ENCOUNTER — ANESTHESIA EVENT (OUTPATIENT)
Dept: SURGERY | Facility: CLINIC | Age: 32
End: 2022-07-09
Payer: COMMERCIAL

## 2022-07-09 ENCOUNTER — ANESTHESIA (OUTPATIENT)
Dept: SURGERY | Facility: CLINIC | Age: 32
End: 2022-07-09
Payer: COMMERCIAL

## 2022-07-09 VITALS
HEART RATE: 85 BPM | RESPIRATION RATE: 16 BRPM | SYSTOLIC BLOOD PRESSURE: 117 MMHG | TEMPERATURE: 98.5 F | OXYGEN SATURATION: 93 % | DIASTOLIC BLOOD PRESSURE: 77 MMHG

## 2022-07-09 DIAGNOSIS — G89.29 CHRONIC ABDOMINAL PAIN: ICD-10-CM

## 2022-07-09 DIAGNOSIS — T18.9XXA SWALLOWED FOREIGN BODY, INITIAL ENCOUNTER: ICD-10-CM

## 2022-07-09 DIAGNOSIS — T18.2XXA FOREIGN BODY IN STOMACH: ICD-10-CM

## 2022-07-09 DIAGNOSIS — Z11.52 ENCOUNTER FOR SCREENING LABORATORY TESTING FOR SEVERE ACUTE RESPIRATORY SYNDROME CORONAVIRUS 2 (SARS-COV-2): ICD-10-CM

## 2022-07-09 DIAGNOSIS — R10.9 CHRONIC ABDOMINAL PAIN: ICD-10-CM

## 2022-07-09 LAB
ALBUMIN SERPL BCG-MCNC: 4.7 G/DL (ref 3.5–5.2)
ALP SERPL-CCNC: 88 U/L (ref 35–104)
ALT SERPL W P-5'-P-CCNC: 49 U/L (ref 10–35)
ANION GAP SERPL CALCULATED.3IONS-SCNC: 13 MMOL/L (ref 7–15)
AST SERPL W P-5'-P-CCNC: 44 U/L (ref 10–35)
BASOPHILS # BLD AUTO: 0.1 10E3/UL (ref 0–0.2)
BASOPHILS NFR BLD AUTO: 1 %
BILIRUB SERPL-MCNC: 0.2 MG/DL
BUN SERPL-MCNC: 10 MG/DL (ref 6–20)
CALCIUM SERPL-MCNC: 10.4 MG/DL (ref 8.6–10)
CHLORIDE SERPL-SCNC: 98 MMOL/L (ref 98–107)
CREAT SERPL-MCNC: 0.64 MG/DL (ref 0.51–0.95)
DEPRECATED HCO3 PLAS-SCNC: 30 MMOL/L (ref 22–29)
EOSINOPHIL # BLD AUTO: 0.2 10E3/UL (ref 0–0.7)
EOSINOPHIL NFR BLD AUTO: 2 %
ERYTHROCYTE [DISTWIDTH] IN BLOOD BY AUTOMATED COUNT: 13.4 % (ref 10–15)
GFR SERPL CREATININE-BSD FRML MDRD: >90 ML/MIN/1.73M2
GLUCOSE SERPL-MCNC: 112 MG/DL (ref 70–99)
HCT VFR BLD AUTO: 42.7 % (ref 35–47)
HGB BLD-MCNC: 14.2 G/DL (ref 11.7–15.7)
IMM GRANULOCYTES # BLD: 0 10E3/UL
IMM GRANULOCYTES NFR BLD: 0 %
INR PPP: 1.04 (ref 0.85–1.15)
LIPASE SERPL-CCNC: 49 U/L (ref 13–60)
LYMPHOCYTES # BLD AUTO: 1.6 10E3/UL (ref 0.8–5.3)
LYMPHOCYTES NFR BLD AUTO: 16 %
MCH RBC QN AUTO: 29.8 PG (ref 26.5–33)
MCHC RBC AUTO-ENTMCNC: 33.3 G/DL (ref 31.5–36.5)
MCV RBC AUTO: 90 FL (ref 78–100)
MONOCYTES # BLD AUTO: 0.6 10E3/UL (ref 0–1.3)
MONOCYTES NFR BLD AUTO: 6 %
NEUTROPHILS # BLD AUTO: 7.3 10E3/UL (ref 1.6–8.3)
NEUTROPHILS NFR BLD AUTO: 75 %
NRBC # BLD AUTO: 0 10E3/UL
NRBC BLD AUTO-RTO: 0 /100
PLATELET # BLD AUTO: 289 10E3/UL (ref 150–450)
POTASSIUM SERPL-SCNC: 3.9 MMOL/L (ref 3.4–5.3)
PROT SERPL-MCNC: 7.6 G/DL (ref 6.4–8.3)
RBC # BLD AUTO: 4.77 10E6/UL (ref 3.8–5.2)
SARS-COV-2 RNA RESP QL NAA+PROBE: NEGATIVE
SODIUM SERPL-SCNC: 141 MMOL/L (ref 136–145)
WBC # BLD AUTO: 9.7 10E3/UL (ref 4–11)

## 2022-07-09 PROCEDURE — 710N000011 HC RECOVERY PHASE 1, LEVEL 3, PER MIN: Performed by: INTERNAL MEDICINE

## 2022-07-09 PROCEDURE — 85610 PROTHROMBIN TIME: CPT | Performed by: INTERNAL MEDICINE

## 2022-07-09 PROCEDURE — 250N000011 HC RX IP 250 OP 636: Performed by: ANESTHESIOLOGY

## 2022-07-09 PROCEDURE — 272N000001 HC OR GENERAL SUPPLY STERILE: Performed by: INTERNAL MEDICINE

## 2022-07-09 PROCEDURE — U0003 INFECTIOUS AGENT DETECTION BY NUCLEIC ACID (DNA OR RNA); SEVERE ACUTE RESPIRATORY SYNDROME CORONAVIRUS 2 (SARS-COV-2) (CORONAVIRUS DISEASE [COVID-19]), AMPLIFIED PROBE TECHNIQUE, MAKING USE OF HIGH THROUGHPUT TECHNOLOGIES AS DESCRIBED BY CMS-2020-01-R: HCPCS | Performed by: INTERNAL MEDICINE

## 2022-07-09 PROCEDURE — 258N000003 HC RX IP 258 OP 636: Performed by: STUDENT IN AN ORGANIZED HEALTH CARE EDUCATION/TRAINING PROGRAM

## 2022-07-09 PROCEDURE — 250N000009 HC RX 250: Performed by: INTERNAL MEDICINE

## 2022-07-09 PROCEDURE — 250N000009 HC RX 250: Performed by: STUDENT IN AN ORGANIZED HEALTH CARE EDUCATION/TRAINING PROGRAM

## 2022-07-09 PROCEDURE — 96375 TX/PRO/DX INJ NEW DRUG ADDON: CPT | Mod: 59

## 2022-07-09 PROCEDURE — 250N000025 HC SEVOFLURANE, PER MIN: Performed by: INTERNAL MEDICINE

## 2022-07-09 PROCEDURE — 36415 COLL VENOUS BLD VENIPUNCTURE: CPT | Performed by: INTERNAL MEDICINE

## 2022-07-09 PROCEDURE — 74018 RADEX ABDOMEN 1 VIEW: CPT | Mod: 26 | Performed by: RADIOLOGY

## 2022-07-09 PROCEDURE — 99285 EMERGENCY DEPT VISIT HI MDM: CPT | Mod: 25

## 2022-07-09 PROCEDURE — 96374 THER/PROPH/DIAG INJ IV PUSH: CPT

## 2022-07-09 PROCEDURE — 370N000017 HC ANESTHESIA TECHNICAL FEE, PER MIN: Performed by: INTERNAL MEDICINE

## 2022-07-09 PROCEDURE — 360N000075 HC SURGERY LEVEL 2, PER MIN: Performed by: INTERNAL MEDICINE

## 2022-07-09 PROCEDURE — 999N000141 HC STATISTIC PRE-PROCEDURE NURSING ASSESSMENT: Performed by: INTERNAL MEDICINE

## 2022-07-09 PROCEDURE — 74018 RADEX ABDOMEN 1 VIEW: CPT

## 2022-07-09 PROCEDURE — 99285 EMERGENCY DEPT VISIT HI MDM: CPT | Performed by: INTERNAL MEDICINE

## 2022-07-09 PROCEDURE — 250N000011 HC RX IP 250 OP 636: Performed by: INTERNAL MEDICINE

## 2022-07-09 PROCEDURE — 85025 COMPLETE CBC W/AUTO DIFF WBC: CPT | Performed by: INTERNAL MEDICINE

## 2022-07-09 PROCEDURE — 250N000011 HC RX IP 250 OP 636: Performed by: STUDENT IN AN ORGANIZED HEALTH CARE EDUCATION/TRAINING PROGRAM

## 2022-07-09 PROCEDURE — 83690 ASSAY OF LIPASE: CPT | Performed by: INTERNAL MEDICINE

## 2022-07-09 PROCEDURE — 71045 X-RAY EXAM CHEST 1 VIEW: CPT | Mod: 26 | Performed by: RADIOLOGY

## 2022-07-09 PROCEDURE — 71045 X-RAY EXAM CHEST 1 VIEW: CPT

## 2022-07-09 PROCEDURE — C9803 HOPD COVID-19 SPEC COLLECT: HCPCS

## 2022-07-09 PROCEDURE — 80053 COMPREHEN METABOLIC PANEL: CPT | Performed by: INTERNAL MEDICINE

## 2022-07-09 RX ORDER — NALOXONE HYDROCHLORIDE 0.4 MG/ML
0.2 INJECTION, SOLUTION INTRAMUSCULAR; INTRAVENOUS; SUBCUTANEOUS
Status: DISCONTINUED | OUTPATIENT
Start: 2022-07-09 | End: 2022-07-10 | Stop reason: HOSPADM

## 2022-07-09 RX ORDER — ONDANSETRON 2 MG/ML
INJECTION INTRAMUSCULAR; INTRAVENOUS PRN
Status: DISCONTINUED | OUTPATIENT
Start: 2022-07-09 | End: 2022-07-09

## 2022-07-09 RX ORDER — KETOROLAC TROMETHAMINE 30 MG/ML
30 INJECTION, SOLUTION INTRAMUSCULAR; INTRAVENOUS
Status: COMPLETED | OUTPATIENT
Start: 2022-07-09 | End: 2022-07-09

## 2022-07-09 RX ORDER — FLUMAZENIL 0.1 MG/ML
0.2 INJECTION, SOLUTION INTRAVENOUS
Status: DISCONTINUED | OUTPATIENT
Start: 2022-07-09 | End: 2022-07-10 | Stop reason: HOSPADM

## 2022-07-09 RX ORDER — DEXAMETHASONE SODIUM PHOSPHATE 4 MG/ML
INJECTION, SOLUTION INTRA-ARTICULAR; INTRALESIONAL; INTRAMUSCULAR; INTRAVENOUS; SOFT TISSUE PRN
Status: DISCONTINUED | OUTPATIENT
Start: 2022-07-09 | End: 2022-07-09

## 2022-07-09 RX ORDER — MAGNESIUM SULFATE HEPTAHYDRATE 40 MG/ML
2 INJECTION, SOLUTION INTRAVENOUS ONCE
Status: COMPLETED | OUTPATIENT
Start: 2022-07-09 | End: 2022-07-09

## 2022-07-09 RX ORDER — SODIUM CHLORIDE, SODIUM LACTATE, POTASSIUM CHLORIDE, CALCIUM CHLORIDE 600; 310; 30; 20 MG/100ML; MG/100ML; MG/100ML; MG/100ML
INJECTION, SOLUTION INTRAVENOUS CONTINUOUS PRN
Status: DISCONTINUED | OUTPATIENT
Start: 2022-07-09 | End: 2022-07-09

## 2022-07-09 RX ORDER — HYDRALAZINE HYDROCHLORIDE 20 MG/ML
2.5-5 INJECTION INTRAMUSCULAR; INTRAVENOUS EVERY 10 MIN PRN
Status: DISCONTINUED | OUTPATIENT
Start: 2022-07-09 | End: 2022-07-09 | Stop reason: HOSPADM

## 2022-07-09 RX ORDER — HYDROMORPHONE HYDROCHLORIDE 1 MG/ML
0.5 INJECTION, SOLUTION INTRAMUSCULAR; INTRAVENOUS; SUBCUTANEOUS ONCE
Status: COMPLETED | OUTPATIENT
Start: 2022-07-09 | End: 2022-07-09

## 2022-07-09 RX ORDER — NALOXONE HYDROCHLORIDE 0.4 MG/ML
0.4 INJECTION, SOLUTION INTRAMUSCULAR; INTRAVENOUS; SUBCUTANEOUS
Status: DISCONTINUED | OUTPATIENT
Start: 2022-07-09 | End: 2022-07-10 | Stop reason: HOSPADM

## 2022-07-09 RX ORDER — PROPOFOL 10 MG/ML
INJECTION, EMULSION INTRAVENOUS PRN
Status: DISCONTINUED | OUTPATIENT
Start: 2022-07-09 | End: 2022-07-09

## 2022-07-09 RX ORDER — ONDANSETRON 4 MG/1
4 TABLET, ORALLY DISINTEGRATING ORAL EVERY 6 HOURS PRN
Status: DISCONTINUED | OUTPATIENT
Start: 2022-07-09 | End: 2022-07-10 | Stop reason: HOSPADM

## 2022-07-09 RX ORDER — LIDOCAINE HYDROCHLORIDE 20 MG/ML
INJECTION, SOLUTION INFILTRATION; PERINEURAL PRN
Status: DISCONTINUED | OUTPATIENT
Start: 2022-07-09 | End: 2022-07-09

## 2022-07-09 RX ORDER — PROCHLORPERAZINE MALEATE 5 MG
10 TABLET ORAL EVERY 6 HOURS PRN
Status: DISCONTINUED | OUTPATIENT
Start: 2022-07-09 | End: 2022-07-10 | Stop reason: HOSPADM

## 2022-07-09 RX ORDER — ONDANSETRON 2 MG/ML
4 INJECTION INTRAMUSCULAR; INTRAVENOUS ONCE
Status: COMPLETED | OUTPATIENT
Start: 2022-07-09 | End: 2022-07-09

## 2022-07-09 RX ORDER — HALOPERIDOL 5 MG/ML
1 INJECTION INTRAMUSCULAR
Status: DISCONTINUED | OUTPATIENT
Start: 2022-07-09 | End: 2022-07-09 | Stop reason: HOSPADM

## 2022-07-09 RX ORDER — METOPROLOL TARTRATE 1 MG/ML
1-2 INJECTION, SOLUTION INTRAVENOUS EVERY 5 MIN PRN
Status: DISCONTINUED | OUTPATIENT
Start: 2022-07-09 | End: 2022-07-09 | Stop reason: HOSPADM

## 2022-07-09 RX ORDER — ACETAMINOPHEN 325 MG/1
975 TABLET ORAL ONCE
Status: DISCONTINUED | OUTPATIENT
Start: 2022-07-09 | End: 2022-07-09 | Stop reason: HOSPADM

## 2022-07-09 RX ORDER — SODIUM CHLORIDE, SODIUM LACTATE, POTASSIUM CHLORIDE, CALCIUM CHLORIDE 600; 310; 30; 20 MG/100ML; MG/100ML; MG/100ML; MG/100ML
INJECTION, SOLUTION INTRAVENOUS CONTINUOUS
Status: DISCONTINUED | OUTPATIENT
Start: 2022-07-09 | End: 2022-07-09 | Stop reason: HOSPADM

## 2022-07-09 RX ORDER — ONDANSETRON 2 MG/ML
4 INJECTION INTRAMUSCULAR; INTRAVENOUS EVERY 6 HOURS PRN
Status: DISCONTINUED | OUTPATIENT
Start: 2022-07-09 | End: 2022-07-10 | Stop reason: HOSPADM

## 2022-07-09 RX ADMIN — DEXAMETHASONE SODIUM PHOSPHATE 4 MG: 4 INJECTION, SOLUTION INTRA-ARTICULAR; INTRALESIONAL; INTRAMUSCULAR; INTRAVENOUS; SOFT TISSUE at 19:08

## 2022-07-09 RX ADMIN — SUCCINYLCHOLINE CHLORIDE 120 MG: 20 INJECTION, SOLUTION INTRAMUSCULAR; INTRAVENOUS; PARENTERAL at 18:52

## 2022-07-09 RX ADMIN — ONDANSETRON 4 MG: 2 INJECTION INTRAMUSCULAR; INTRAVENOUS at 17:22

## 2022-07-09 RX ADMIN — MAGNESIUM SULFATE IN WATER 2 G: 40 INJECTION, SOLUTION INTRAVENOUS at 20:30

## 2022-07-09 RX ADMIN — LIDOCAINE HYDROCHLORIDE 100 MG: 20 INJECTION, SOLUTION INFILTRATION; PERINEURAL at 18:52

## 2022-07-09 RX ADMIN — HYDROMORPHONE HYDROCHLORIDE 0.5 MG: 1 INJECTION, SOLUTION INTRAMUSCULAR; INTRAVENOUS; SUBCUTANEOUS at 21:43

## 2022-07-09 RX ADMIN — HYDROMORPHONE HYDROCHLORIDE 0.5 MG: 1 INJECTION, SOLUTION INTRAMUSCULAR; INTRAVENOUS; SUBCUTANEOUS at 17:21

## 2022-07-09 RX ADMIN — KETOROLAC TROMETHAMINE 30 MG: 30 INJECTION, SOLUTION INTRAMUSCULAR at 20:18

## 2022-07-09 RX ADMIN — ONDANSETRON 4 MG: 2 INJECTION INTRAMUSCULAR; INTRAVENOUS at 19:08

## 2022-07-09 RX ADMIN — SODIUM CHLORIDE, POTASSIUM CHLORIDE, SODIUM LACTATE AND CALCIUM CHLORIDE: 600; 310; 30; 20 INJECTION, SOLUTION INTRAVENOUS at 18:43

## 2022-07-09 RX ADMIN — PROPOFOL 200 MG: 10 INJECTION, EMULSION INTRAVENOUS at 18:52

## 2022-07-09 ASSESSMENT — ENCOUNTER SYMPTOMS
DIFFICULTY URINATING: 0
ADENOPATHY: 0
HEADACHES: 0
CONFUSION: 0
COUGH: 0
SHORTNESS OF BREATH: 0
NUMBNESS: 0
TROUBLE SWALLOWING: 0
WEAKNESS: 0
ABDOMINAL PAIN: 1
SEIZURES: 0
VOMITING: 0
NAUSEA: 0
LIGHT-HEADEDNESS: 0
FEVER: 0
CHILLS: 0
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
## 2022-07-09 NOTE — ED TRIAGE NOTES
Patient swallowed a iván pin prior to arrival. Patient reports this is a compulsive behavior of hers which she acts upon every 5 to 6 month. Patient denies swallowing the iván pin as on act of self hard and denies suicidal or homicidal thoughts. Patient has a similar event 5 month ago which required an endoscopy to remove iván pin       Triage Assessment     Row Name 07/09/22 1459       Triage Assessment (Adult)    Airway WDL WDL       Respiratory WDL    Respiratory WDL WDL       Skin Circulation/Temperature WDL    Skin Circulation/Temperature WDL WDL       Cardiac WDL    Cardiac WDL WDL       Peripheral/Neurovascular WDL    Peripheral Neurovascular WDL WDL
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
## 2022-07-09 NOTE — CONSULTS
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   07/05/22 118.4 kg (261 lb)   02/17/22 129.8 kg (286 lb 1.6 oz)      Constitutional: cooperative, pleasant, not dyspneic/diaphoretic, no acute distress  Eyes: Sclera anicteric/injected  Ears/nose/mouth/throat: Mucus membranes moist  Neck: supple  CV: No edema  Respiratory: Unlabored breathing  Abdomen: Obese, no hepatosplenomegaly, nontender, no peritoneal signs  Skin: warm, perfused, no jaundice  Neuro: AAO x 3, No  asterixis  Psych: Normal affect       Data:   Labs and imaging below were independently reviewed and interpreted    BMPNo lab results found in last 7 days.  CBC  Recent Labs   Lab 07/09/22  1642   WBC 9.7   RBC 4.77   HGB 14.2   HCT 42.7   MCV 90   MCH 29.8   MCHC 33.3   RDW 13.4        INR  Recent Labs   Lab 07/09/22  1642   INR 1.04     LFTsNo lab results found in last 7 days.   PANCNo lab results found in last 7 days.    Imaging:    Abdominal Xray                                                             IMPRESSION:   Linear opaque 12.5 cm foreign body projects over the upper abdomen,  likely within the stomach. No free air is seen within the abdomen.

## 2022-07-09 NOTE — ED PROVIDER NOTES
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 - 0.95 mg/dL    Urea Nitrogen 10.0 6.0 - 20.0 mg/dL    Chloride 98 98 - 107 mmol/L    Carbon Dioxide (CO2) 30 (H) 22 - 29 mmol/L    Anion Gap 13 7 - 15 mmol/L    Glucose 112 (H) 70 - 99 mg/dL    Calcium 10.4 (H) 8.6 - 10.0 mg/dL    Protein Total 7.6 6.4 - 8.3 g/dL    Albumin 4.7 3.5 - 5.2 g/dL    Bilirubin Total 0.2 <=1.2 mg/dL    Alkaline Phosphatase 88 35 - 104 U/L    AST  44 (H) 10 - 35 U/L    ALT 49 (H) 10 - 35 U/L    GFR Estimate >90 >60 mL/min/1.73m2   Lipase   Result Value Ref Range    Lipase 49 13 - 60 U/L   CBC with platelets and differential   Result Value Ref Range    WBC Count 9.7 4.0 - 11.0 10e3/uL    RBC Count 4.77 3.80 - 5.20 10e6/uL    Hemoglobin 14.2 11.7 - 15.7 g/dL    Hematocrit 42.7 35.0 - 47.0 %    MCV 90 78 - 100 fL    MCH 29.8 26.5 - 33.0 pg    MCHC 33.3 31.5 - 36.5 g/dL    RDW 13.4 10.0 - 15.0 %    Platelet Count 289 150 - 450 10e3/uL    % Neutrophils 75 %    % Lymphocytes 16 %    % Monocytes 6 %    % Eosinophils 2 %    % Basophils 1 %    % Immature Granulocytes 0 %    NRBCs per 100 WBC 0 <1 /100    Absolute Neutrophils 7.3 1.6 - 8.3 10e3/uL    Absolute Lymphocytes 1.6 0.8 - 5.3 10e3/uL    Absolute Monocytes 0.6 0.0 - 1.3 10e3/uL    Absolute Eosinophils 0.2 0.0 - 0.7 10e3/uL    Absolute Basophils 0.1 0.0 - 0.2 10e3/uL    Absolute Immature Granulocytes 0.0 <=0.4 10e3/uL    Absolute NRBCs 0.0 10e3/uL     GI consulted at 1620    Medications   ondansetron (ZOFRAN) injection 4 mg (4 mg Intravenous Given 7/9/22 1722)   HYDROmorphone (PF) (DILAUDID) injection 0.5 mg (0.5 mg Intravenous Given 7/9/22 1721)        Assessments & Plan (with Medical Decision Making)   Impression:  Young woman with a history of recurrent foreign body ingestions, usually when under stress. She presents after swallowing a straightened mickie pain. AXR demonstrates metallic FB in the epigastrium consistent with reported history. She has LUQ abdominal pain consistent with her past chronic abdominal pain. GI consulted for probable endoscopic FB removal. She will be taken to the OR for foreign body removal. Gi will return her to the ED following the procedure for discharge/disposition.    I have reviewed the nursing notes. I have reviewed the findings, diagnosis, plan and need for follow up with the patient.    New Prescriptions    No medications on file       Final diagnoses:   Swallowed foreign  body, initial encounter       --  Gustavo Quintero  [facility] EMERGENCY DEPARTMENT  7/9/2022     Gustavo Quintero MD  07/09/22 9170      Addendum:  Metallic foreign body successfully removed by GI in the OR. She was observed after anesthesia in the PAR and returns to the ED for discharge to home. She continues to have LUQ abdominal pain (chronic). SHe will be discharged back to her residence.     Gustavo Quintero MD  07/09/22 1728

## 2022-07-10 LAB — UPPER GI ENDOSCOPY: NORMAL

## 2022-07-10 NOTE — ED NOTES
Bed: ED08  Expected date:   Expected time:   Means of arrival:   Comments:  Pt coming back from IR

## 2022-07-10 NOTE — ANESTHESIA POSTPROCEDURE EVALUATION
Patient: Nevin Alvarado    Procedure: Procedure(s):  ESOPHAGOGASTRODUODENOSCOPY (EGD) with foreign body extraction       Anesthesia Type:  General    Note:  Disposition: Outpatient   Postop Pain Control: Uneventful            Sign Out: Well controlled pain   PONV: No   Neuro/Psych: Uneventful            Sign Out: Acceptable/Baseline neuro status   Airway/Respiratory: Uneventful            Sign Out: Acceptable/Baseline resp. status   CV/Hemodynamics: Uneventful            Sign Out: Acceptable CV status; No obvious hypovolemia; No obvious fluid overload   Other NRE: NONE   DID A NON-ROUTINE EVENT OCCUR? No           Last vitals:  Vitals Value Taken Time   /82 07/09/22 2100   Temp 36.9  C (98.5  F) 07/09/22 2003   Pulse 82 07/09/22 2111   Resp 15 07/09/22 2045   SpO2 96 % 07/09/22 2112   Vitals shown include unvalidated device data.    Electronically Signed By: ROLANDA GONZALEZ MD  July 9, 2022  10:10 PM

## 2022-07-10 NOTE — ANESTHESIA PROCEDURE NOTES
Airway         Procedure Start/Stop Times: 7/9/2022 6:55 PM  Staff -        Anesthesiologist:  Fausto Mendoza MD       Resident/Fellow: Doug Cabrales MD       Performed By: resident    Final Airway Details       Final airway type: endotracheal airway       Successful airway: ETT - single  Endotracheal Airway Details        ETT size (mm): 7.5       Cuffed: yes       Successful intubation technique: direct laryngoscopy and video laryngoscopy       VL Blade Size: MAC 4       Grade View of Cords: 2       Adjucts: stylet       Position: Right       Measured from: gums/teeth       Secured at (cm): 22       Bite block used: None    Post intubation assessment        Placement verified by: capnometry        Number of attempts at approach: 1       Number of other approaches attempted: 0       Secured with: silk tape       Ease of procedure: easy       Dentition: Intact    Medication(s) Administered   Medication Administration Time: 7/9/2022 6:55 PM

## 2022-07-10 NOTE — PROGRESS NOTES
Discussed successful removal of foreign body with the patient and encouraged her to seek mental care help if she gets the urge to ingest something in the future

## 2022-07-10 NOTE — ANESTHESIA CARE TRANSFER NOTE
Patient: Nevin Alvarado    Procedure: Procedure(s):  ESOPHAGOGASTRODUODENOSCOPY (EGD) with foreign body extraction       Diagnosis: Impacted esophageal foreign body, initial encounter [T18.108A]  Diagnosis Additional Information: No value filed.    Anesthesia Type:   General     Note:    Oropharynx: oropharynx clear of all foreign objects  Level of Consciousness: awake  Oxygen Supplementation: nasal cannula    Independent Airway: airway patency satisfactory and stable  Dentition: dentition unchanged  Vital Signs Stable: post-procedure vital signs reviewed and stable  Report to RN Given: handoff report given  Patient transferred to: PACU    Handoff Report: Identifed the Patient, Identified the Reponsible Provider, Reviewed the pertinent medical history, Discussed the surgical course, Reviewed Intra-OP anesthesia mangement and issues during anesthesia, Set expectations for post-procedure period and Allowed opportunity for questions and acknowledgement of understanding      Vitals:  Vitals Value Taken Time   BP     Temp     Pulse 87 07/09/22 2005   Resp 15 07/09/22 2005   SpO2 99 % 07/09/22 2005   Vitals shown include unvalidated device data.    Electronically Signed By: HU Grant CRNA  July 9, 2022  8:06 PM

## 2022-07-10 NOTE — OR NURSING
"Cared for patient during recovery period, 1998-0315 on 7/9/22.    Patient made multiple requests for stronger pain medications, requesting \"fentanyl or dilaudid\", beginning right away on arrival to recovery room. Per Dr Barrera, anesthesiologist, will give iv toradol and magnesium supplement, patient declined oral tylenol. Patient's nonverbal pain assessment charted. I was transparent and kevin with patient about risk for surgical intervention to be incentivized by receiving IV narcotics at request and focused attention by surgical staff - of note, I was Nevin's recovery nurse in February of 2022 and set and held similar boundaries  "

## 2022-07-10 NOTE — DISCHARGE INSTRUCTIONS
Discharge Instructions after  Upper Endoscopy (EGD)    Activity and Diet  You were given medicine for pain. You may be dizzy or sleepy.  For 24 hours:   Do not drive or use heavy equipment.   Do not make important decisions.   Do not drink any alcohol.  --You may return to your regular diet.    Discomfort  You may have a sore throat for 2 to 3 days. It may help to:   Avoid hot liquids for 24 hours.   Use sore throat lozenges.   Gargle as needed with salt water up to 4 times a day. Mix 1 cup of warm water  with 1 teaspoon of salt. Do not swallow.      You may take Tylenol (acetaminophen) for pain.    Other instructions: seek mental care help if you get the urge to ingest something in the future    When to call us:  Problems are rare. Call right away if you have:   Unusual throat pain or trouble swallowing   Unusual pain in belly or chest that is not relieved by belching or passing air   Black stools (tar-like looking bowel movement)   Temperature above 100.6  F. (37.5  C).    If you vomit blood or have severe pain, go to an emergency room.    If you have questions, call:  Monday to Friday, 8 a.m. to 4:30 p.m.: Central Scheduling Department:[phone]    After hours: [facility]: [phone] (Ask for the GI fellow on call)

## 2022-07-28 ENCOUNTER — APPOINTMENT (OUTPATIENT)
Dept: GENERAL RADIOLOGY | Facility: CLINIC | Age: 32
End: 2022-07-28
Attending: EMERGENCY MEDICINE
Payer: COMMERCIAL

## 2022-07-28 PROCEDURE — 99285 EMERGENCY DEPT VISIT HI MDM: CPT | Mod: 25

## 2022-07-28 PROCEDURE — C9803 HOPD COVID-19 SPEC COLLECT: HCPCS

## 2022-07-28 PROCEDURE — 74018 RADEX ABDOMEN 1 VIEW: CPT

## 2022-07-28 PROCEDURE — 74018 RADEX ABDOMEN 1 VIEW: CPT | Mod: 26 | Performed by: RADIOLOGY

## 2022-07-28 PROCEDURE — 71045 X-RAY EXAM CHEST 1 VIEW: CPT

## 2022-07-28 PROCEDURE — 99285 EMERGENCY DEPT VISIT HI MDM: CPT | Performed by: EMERGENCY MEDICINE

## 2022-07-28 PROCEDURE — 71045 X-RAY EXAM CHEST 1 VIEW: CPT | Mod: 26 | Performed by: RADIOLOGY

## 2022-07-29 ENCOUNTER — ANESTHESIA EVENT (OUTPATIENT)
Dept: SURGERY | Facility: CLINIC | Age: 32
End: 2022-07-29
Payer: COMMERCIAL

## 2022-07-29 ENCOUNTER — HOSPITAL ENCOUNTER (OUTPATIENT)
Facility: CLINIC | Age: 32
Setting detail: OBSERVATION
Discharge: HOME | End: 2022-07-29
Attending: EMERGENCY MEDICINE | Admitting: PHYSICIAN ASSISTANT
Payer: COMMERCIAL

## 2022-07-29 ENCOUNTER — ANESTHESIA (OUTPATIENT)
Dept: SURGERY | Facility: CLINIC | Age: 32
End: 2022-07-29
Payer: COMMERCIAL

## 2022-07-29 VITALS
RESPIRATION RATE: 22 BRPM | SYSTOLIC BLOOD PRESSURE: 150 MMHG | BODY MASS INDEX: 53.92 KG/M2 | HEIGHT: 62 IN | HEART RATE: 111 BPM | DIASTOLIC BLOOD PRESSURE: 87 MMHG | TEMPERATURE: 98 F | WEIGHT: 293 LBS | OXYGEN SATURATION: 100 %

## 2022-07-29 DIAGNOSIS — Z11.52 ENCOUNTER FOR SCREENING LABORATORY TESTING FOR SEVERE ACUTE RESPIRATORY SYNDROME CORONAVIRUS 2 (SARS-COV-2): ICD-10-CM

## 2022-07-29 DIAGNOSIS — T18.9XXA SWALLOWED FOREIGN BODY, INITIAL ENCOUNTER: ICD-10-CM

## 2022-07-29 LAB
ALBUMIN SERPL BCG-MCNC: 4.2 G/DL (ref 3.5–5.2)
ALP SERPL-CCNC: 67 U/L (ref 35–104)
ALT SERPL W P-5'-P-CCNC: 37 U/L (ref 10–35)
ANION GAP SERPL CALCULATED.3IONS-SCNC: 23 MMOL/L (ref 7–15)
AST SERPL W P-5'-P-CCNC: 47 U/L (ref 10–35)
BASOPHILS # BLD AUTO: 0 10E3/UL (ref 0–0.2)
BASOPHILS NFR BLD AUTO: 1 %
BILIRUB SERPL-MCNC: 0.3 MG/DL
BUN SERPL-MCNC: 13.6 MG/DL (ref 6–20)
CALCIUM SERPL-MCNC: 9.6 MG/DL (ref 8.6–10)
CHLORIDE SERPL-SCNC: 102 MMOL/L (ref 98–107)
CREAT SERPL-MCNC: 0.62 MG/DL (ref 0.51–0.95)
DEPRECATED HCO3 PLAS-SCNC: 18 MMOL/L (ref 22–29)
EOSINOPHIL # BLD AUTO: 0.2 10E3/UL (ref 0–0.7)
EOSINOPHIL NFR BLD AUTO: 3 %
ERYTHROCYTE [DISTWIDTH] IN BLOOD BY AUTOMATED COUNT: 13.4 % (ref 10–15)
GFR SERPL CREATININE-BSD FRML MDRD: >90 ML/MIN/1.73M2
GLUCOSE SERPL-MCNC: 117 MG/DL (ref 70–99)
HCT VFR BLD AUTO: 37.4 % (ref 35–47)
HGB BLD-MCNC: 12.3 G/DL (ref 11.7–15.7)
IMM GRANULOCYTES # BLD: 0 10E3/UL
IMM GRANULOCYTES NFR BLD: 0 %
INR PPP: 1 (ref 0.85–1.15)
LYMPHOCYTES # BLD AUTO: 1.7 10E3/UL (ref 0.8–5.3)
LYMPHOCYTES NFR BLD AUTO: 30 %
MCH RBC QN AUTO: 29.5 PG (ref 26.5–33)
MCHC RBC AUTO-ENTMCNC: 32.9 G/DL (ref 31.5–36.5)
MCV RBC AUTO: 90 FL (ref 78–100)
MONOCYTES # BLD AUTO: 0.5 10E3/UL (ref 0–1.3)
MONOCYTES NFR BLD AUTO: 9 %
NEUTROPHILS # BLD AUTO: 3.4 10E3/UL (ref 1.6–8.3)
NEUTROPHILS NFR BLD AUTO: 57 %
NRBC # BLD AUTO: 0 10E3/UL
NRBC BLD AUTO-RTO: 0 /100
PLATELET # BLD AUTO: 221 10E3/UL (ref 150–450)
POTASSIUM SERPL-SCNC: 4.1 MMOL/L (ref 3.4–5.3)
PROT SERPL-MCNC: 6.9 G/DL (ref 6.4–8.3)
RBC # BLD AUTO: 4.17 10E6/UL (ref 3.8–5.2)
SARS-COV-2 RNA RESP QL NAA+PROBE: NEGATIVE
SODIUM SERPL-SCNC: 143 MMOL/L (ref 136–145)
UPPER GI ENDOSCOPY: NORMAL
WBC # BLD AUTO: 5.8 10E3/UL (ref 4–11)

## 2022-07-29 PROCEDURE — 250N000025 HC SEVOFLURANE, PER MIN: Performed by: INTERNAL MEDICINE

## 2022-07-29 PROCEDURE — 250N000013 HC RX MED GY IP 250 OP 250 PS 637: Performed by: PHYSICIAN ASSISTANT

## 2022-07-29 PROCEDURE — U0005 INFEC AGEN DETEC AMPLI PROBE: HCPCS | Performed by: EMERGENCY MEDICINE

## 2022-07-29 PROCEDURE — 360N000082 HC SURGERY LEVEL 2 W/ FLUORO, PER MIN: Performed by: INTERNAL MEDICINE

## 2022-07-29 PROCEDURE — 370N000017 HC ANESTHESIA TECHNICAL FEE, PER MIN: Performed by: INTERNAL MEDICINE

## 2022-07-29 PROCEDURE — 96375 TX/PRO/DX INJ NEW DRUG ADDON: CPT

## 2022-07-29 PROCEDURE — 250N000011 HC RX IP 250 OP 636: Performed by: PHYSICIAN ASSISTANT

## 2022-07-29 PROCEDURE — 258N000003 HC RX IP 258 OP 636: Performed by: STUDENT IN AN ORGANIZED HEALTH CARE EDUCATION/TRAINING PROGRAM

## 2022-07-29 PROCEDURE — 250N000009 HC RX 250: Performed by: INTERNAL MEDICINE

## 2022-07-29 PROCEDURE — 99203 OFFICE O/P NEW LOW 30 MIN: CPT | Performed by: PSYCHIATRY & NEUROLOGY

## 2022-07-29 PROCEDURE — C9113 INJ PANTOPRAZOLE SODIUM, VIA: HCPCS | Performed by: PHYSICIAN ASSISTANT

## 2022-07-29 PROCEDURE — G0378 HOSPITAL OBSERVATION PER HR: HCPCS

## 2022-07-29 PROCEDURE — 272N000001 HC OR GENERAL SUPPLY STERILE: Performed by: INTERNAL MEDICINE

## 2022-07-29 PROCEDURE — 250N000011 HC RX IP 250 OP 636: Performed by: STUDENT IN AN ORGANIZED HEALTH CARE EDUCATION/TRAINING PROGRAM

## 2022-07-29 PROCEDURE — 999N000141 HC STATISTIC PRE-PROCEDURE NURSING ASSESSMENT: Performed by: INTERNAL MEDICINE

## 2022-07-29 PROCEDURE — 36415 COLL VENOUS BLD VENIPUNCTURE: CPT | Performed by: PHYSICIAN ASSISTANT

## 2022-07-29 PROCEDURE — 85025 COMPLETE CBC W/AUTO DIFF WBC: CPT | Performed by: PHYSICIAN ASSISTANT

## 2022-07-29 PROCEDURE — 250N000009 HC RX 250: Performed by: STUDENT IN AN ORGANIZED HEALTH CARE EDUCATION/TRAINING PROGRAM

## 2022-07-29 PROCEDURE — 99213 OFFICE O/P EST LOW 20 MIN: CPT | Mod: 25 | Performed by: INTERNAL MEDICINE

## 2022-07-29 PROCEDURE — 80053 COMPREHEN METABOLIC PANEL: CPT | Performed by: PHYSICIAN ASSISTANT

## 2022-07-29 PROCEDURE — 999N000248 HC STATISTIC IV INSERT WITH US BY RN

## 2022-07-29 PROCEDURE — 258N000003 HC RX IP 258 OP 636: Performed by: PHYSICIAN ASSISTANT

## 2022-07-29 PROCEDURE — 710N000010 HC RECOVERY PHASE 1, LEVEL 2, PER MIN: Performed by: INTERNAL MEDICINE

## 2022-07-29 PROCEDURE — 85610 PROTHROMBIN TIME: CPT | Performed by: PHYSICIAN ASSISTANT

## 2022-07-29 PROCEDURE — 96376 TX/PRO/DX INJ SAME DRUG ADON: CPT

## 2022-07-29 PROCEDURE — 96374 THER/PROPH/DIAG INJ IV PUSH: CPT

## 2022-07-29 RX ORDER — DIPHENHYDRAMINE HCL 25 MG
25 CAPSULE ORAL EVERY 6 HOURS PRN
Status: DISCONTINUED | OUTPATIENT
Start: 2022-07-29 | End: 2022-07-29 | Stop reason: HOSPADM

## 2022-07-29 RX ORDER — MONTELUKAST SODIUM 10 MG/1
10 TABLET ORAL DAILY
Status: DISCONTINUED | OUTPATIENT
Start: 2022-07-29 | End: 2022-07-29 | Stop reason: HOSPADM

## 2022-07-29 RX ORDER — CETIRIZINE HYDROCHLORIDE 10 MG/1
10 TABLET ORAL AT BEDTIME
Status: DISCONTINUED | OUTPATIENT
Start: 2022-07-29 | End: 2022-07-29 | Stop reason: HOSPADM

## 2022-07-29 RX ORDER — ONDANSETRON 2 MG/ML
4 INJECTION INTRAMUSCULAR; INTRAVENOUS EVERY 6 HOURS PRN
Status: DISCONTINUED | OUTPATIENT
Start: 2022-07-29 | End: 2022-07-29 | Stop reason: HOSPADM

## 2022-07-29 RX ORDER — DIPHENHYDRAMINE HYDROCHLORIDE 50 MG/ML
25 INJECTION INTRAMUSCULAR; INTRAVENOUS EVERY 10 MIN PRN
Status: COMPLETED | OUTPATIENT
Start: 2022-07-29 | End: 2022-07-29

## 2022-07-29 RX ORDER — MONTELUKAST SODIUM 10 MG/1
10 TABLET ORAL EVERY EVENING
COMMUNITY
Start: 2022-06-21

## 2022-07-29 RX ORDER — KETOROLAC TROMETHAMINE 30 MG/ML
15 INJECTION, SOLUTION INTRAMUSCULAR; INTRAVENOUS
Status: DISCONTINUED | OUTPATIENT
Start: 2022-07-29 | End: 2022-07-29 | Stop reason: HOSPADM

## 2022-07-29 RX ORDER — HYDROXYZINE HYDROCHLORIDE 25 MG/1
25 TABLET, FILM COATED ORAL EVERY 6 HOURS PRN
Status: ON HOLD | COMMUNITY
Start: 2022-04-14 | End: 2022-08-25

## 2022-07-29 RX ORDER — BUSPIRONE HYDROCHLORIDE 10 MG/1
20 TABLET ORAL 3 TIMES DAILY
Status: DISCONTINUED | OUTPATIENT
Start: 2022-07-29 | End: 2022-07-29 | Stop reason: HOSPADM

## 2022-07-29 RX ORDER — DEXAMETHASONE SODIUM PHOSPHATE 4 MG/ML
INJECTION, SOLUTION INTRA-ARTICULAR; INTRALESIONAL; INTRAMUSCULAR; INTRAVENOUS; SOFT TISSUE PRN
Status: DISCONTINUED | OUTPATIENT
Start: 2022-07-29 | End: 2022-07-29

## 2022-07-29 RX ORDER — AMOXICILLIN 250 MG
1 CAPSULE ORAL 2 TIMES DAILY
Status: DISCONTINUED | OUTPATIENT
Start: 2022-07-29 | End: 2022-07-29 | Stop reason: HOSPADM

## 2022-07-29 RX ORDER — HYDROMORPHONE HYDROCHLORIDE 1 MG/ML
0.5 INJECTION, SOLUTION INTRAMUSCULAR; INTRAVENOUS; SUBCUTANEOUS EVERY 10 MIN PRN
Status: COMPLETED | OUTPATIENT
Start: 2022-07-29 | End: 2022-07-29

## 2022-07-29 RX ORDER — FERROUS SULFATE 325(65) MG
325 TABLET ORAL
Status: DISCONTINUED | OUTPATIENT
Start: 2022-07-29 | End: 2022-07-29 | Stop reason: HOSPADM

## 2022-07-29 RX ORDER — PROCHLORPERAZINE 25 MG
25 SUPPOSITORY, RECTAL RECTAL EVERY 12 HOURS PRN
Status: DISCONTINUED | OUTPATIENT
Start: 2022-07-29 | End: 2022-07-29 | Stop reason: HOSPADM

## 2022-07-29 RX ORDER — IBUPROFEN 600 MG/1
600 TABLET, FILM COATED ORAL EVERY 6 HOURS PRN
COMMUNITY
Start: 2022-01-13 | End: 2022-12-08

## 2022-07-29 RX ORDER — NALOXONE HYDROCHLORIDE 0.4 MG/ML
0.2 INJECTION, SOLUTION INTRAMUSCULAR; INTRAVENOUS; SUBCUTANEOUS
Status: DISCONTINUED | OUTPATIENT
Start: 2022-07-29 | End: 2022-07-29 | Stop reason: HOSPADM

## 2022-07-29 RX ORDER — ACETAMINOPHEN 325 MG/1
650 TABLET ORAL EVERY 6 HOURS PRN
Status: DISCONTINUED | OUTPATIENT
Start: 2022-07-29 | End: 2022-07-29 | Stop reason: HOSPADM

## 2022-07-29 RX ORDER — ONDANSETRON 2 MG/ML
4 INJECTION INTRAMUSCULAR; INTRAVENOUS EVERY 30 MIN PRN
Status: DISCONTINUED | OUTPATIENT
Start: 2022-07-29 | End: 2022-07-29 | Stop reason: HOSPADM

## 2022-07-29 RX ORDER — SUMATRIPTAN 25 MG/1
25 TABLET, FILM COATED ORAL
COMMUNITY
Start: 2022-01-26 | End: 2024-03-30

## 2022-07-29 RX ORDER — NICOTINE POLACRILEX 4 MG
15-30 LOZENGE BUCCAL
Status: DISCONTINUED | OUTPATIENT
Start: 2022-07-29 | End: 2022-07-29 | Stop reason: HOSPADM

## 2022-07-29 RX ORDER — FENTANYL CITRATE 50 UG/ML
INJECTION, SOLUTION INTRAMUSCULAR; INTRAVENOUS PRN
Status: DISCONTINUED | OUTPATIENT
Start: 2022-07-29 | End: 2022-07-29

## 2022-07-29 RX ORDER — PROCHLORPERAZINE MALEATE 10 MG
10 TABLET ORAL EVERY 6 HOURS PRN
Status: DISCONTINUED | OUTPATIENT
Start: 2022-07-29 | End: 2022-07-29 | Stop reason: HOSPADM

## 2022-07-29 RX ORDER — ONDANSETRON 2 MG/ML
INJECTION INTRAMUSCULAR; INTRAVENOUS PRN
Status: DISCONTINUED | OUTPATIENT
Start: 2022-07-29 | End: 2022-07-29

## 2022-07-29 RX ORDER — LURASIDONE HYDROCHLORIDE 40 MG/1
40 TABLET, FILM COATED ORAL AT BEDTIME
Status: DISCONTINUED | OUTPATIENT
Start: 2022-07-29 | End: 2022-07-29 | Stop reason: HOSPADM

## 2022-07-29 RX ORDER — NALOXONE HYDROCHLORIDE 0.4 MG/ML
0.4 INJECTION, SOLUTION INTRAMUSCULAR; INTRAVENOUS; SUBCUTANEOUS
Status: DISCONTINUED | OUTPATIENT
Start: 2022-07-29 | End: 2022-07-29 | Stop reason: HOSPADM

## 2022-07-29 RX ORDER — FLUMAZENIL 0.1 MG/ML
0.2 INJECTION, SOLUTION INTRAVENOUS
Status: DISCONTINUED | OUTPATIENT
Start: 2022-07-29 | End: 2022-07-29 | Stop reason: HOSPADM

## 2022-07-29 RX ORDER — HYDRALAZINE HYDROCHLORIDE 20 MG/ML
2.5-5 INJECTION INTRAMUSCULAR; INTRAVENOUS EVERY 10 MIN PRN
Status: DISCONTINUED | OUTPATIENT
Start: 2022-07-29 | End: 2022-07-29 | Stop reason: HOSPADM

## 2022-07-29 RX ORDER — HYDROXYZINE HYDROCHLORIDE 25 MG/1
25 TABLET, FILM COATED ORAL EVERY 6 HOURS PRN
Status: DISCONTINUED | OUTPATIENT
Start: 2022-07-29 | End: 2022-07-29 | Stop reason: HOSPADM

## 2022-07-29 RX ORDER — METFORMIN HYDROCHLORIDE EXTENDED-RELEASE TABLETS 1000 MG/1
1000 TABLET, FILM COATED, EXTENDED RELEASE ORAL
Status: DISCONTINUED | OUTPATIENT
Start: 2022-07-29 | End: 2022-07-29

## 2022-07-29 RX ORDER — HYDROMORPHONE HCL IN WATER/PF 6 MG/30 ML
0.2 PATIENT CONTROLLED ANALGESIA SYRINGE INTRAVENOUS ONCE
Status: COMPLETED | OUTPATIENT
Start: 2022-07-29 | End: 2022-07-29

## 2022-07-29 RX ORDER — SODIUM CHLORIDE, SODIUM LACTATE, POTASSIUM CHLORIDE, CALCIUM CHLORIDE 600; 310; 30; 20 MG/100ML; MG/100ML; MG/100ML; MG/100ML
INJECTION, SOLUTION INTRAVENOUS CONTINUOUS
Status: DISCONTINUED | OUTPATIENT
Start: 2022-07-29 | End: 2022-07-29 | Stop reason: HOSPADM

## 2022-07-29 RX ORDER — ONDANSETRON 4 MG/1
4 TABLET, ORALLY DISINTEGRATING ORAL EVERY 30 MIN PRN
Status: DISCONTINUED | OUTPATIENT
Start: 2022-07-29 | End: 2022-07-29 | Stop reason: HOSPADM

## 2022-07-29 RX ORDER — HYDROMORPHONE HYDROCHLORIDE 1 MG/ML
0.2 INJECTION, SOLUTION INTRAMUSCULAR; INTRAVENOUS; SUBCUTANEOUS EVERY 10 MIN PRN
Status: DISCONTINUED | OUTPATIENT
Start: 2022-07-29 | End: 2022-07-29 | Stop reason: HOSPADM

## 2022-07-29 RX ORDER — KETOROLAC TROMETHAMINE 15 MG/ML
15 INJECTION, SOLUTION INTRAMUSCULAR; INTRAVENOUS EVERY 6 HOURS PRN
Status: DISCONTINUED | OUTPATIENT
Start: 2022-07-29 | End: 2022-07-29 | Stop reason: HOSPADM

## 2022-07-29 RX ORDER — LIDOCAINE HYDROCHLORIDE 20 MG/ML
INJECTION, SOLUTION INFILTRATION; PERINEURAL PRN
Status: DISCONTINUED | OUTPATIENT
Start: 2022-07-29 | End: 2022-07-29

## 2022-07-29 RX ORDER — AMOXICILLIN 250 MG
2 CAPSULE ORAL 2 TIMES DAILY
Status: DISCONTINUED | OUTPATIENT
Start: 2022-07-29 | End: 2022-07-29 | Stop reason: HOSPADM

## 2022-07-29 RX ORDER — HYDROXYCHLOROQUINE SULFATE 200 MG/1
200 TABLET, FILM COATED ORAL 2 TIMES DAILY
Status: DISCONTINUED | OUTPATIENT
Start: 2022-07-29 | End: 2022-07-29 | Stop reason: HOSPADM

## 2022-07-29 RX ORDER — IBUPROFEN 600 MG/1
600 TABLET, FILM COATED ORAL EVERY 6 HOURS PRN
Status: DISCONTINUED | OUTPATIENT
Start: 2022-07-29 | End: 2022-07-29 | Stop reason: HOSPADM

## 2022-07-29 RX ORDER — PREGABALIN 100 MG/1
100 CAPSULE ORAL 3 TIMES DAILY
Status: DISCONTINUED | OUTPATIENT
Start: 2022-07-29 | End: 2022-07-29 | Stop reason: HOSPADM

## 2022-07-29 RX ORDER — PROPOFOL 10 MG/ML
INJECTION, EMULSION INTRAVENOUS PRN
Status: DISCONTINUED | OUTPATIENT
Start: 2022-07-29 | End: 2022-07-29

## 2022-07-29 RX ORDER — DEXTROSE MONOHYDRATE 25 G/50ML
25-50 INJECTION, SOLUTION INTRAVENOUS
Status: DISCONTINUED | OUTPATIENT
Start: 2022-07-29 | End: 2022-07-29 | Stop reason: HOSPADM

## 2022-07-29 RX ORDER — DESVENLAFAXINE 100 MG/1
100 TABLET, EXTENDED RELEASE ORAL DAILY
Status: DISCONTINUED | OUTPATIENT
Start: 2022-07-29 | End: 2022-07-29 | Stop reason: HOSPADM

## 2022-07-29 RX ORDER — LIDOCAINE 40 MG/G
CREAM TOPICAL
Status: DISCONTINUED | OUTPATIENT
Start: 2022-07-29 | End: 2022-07-29 | Stop reason: HOSPADM

## 2022-07-29 RX ORDER — ONDANSETRON 4 MG/1
4 TABLET, ORALLY DISINTEGRATING ORAL EVERY 6 HOURS PRN
Status: DISCONTINUED | OUTPATIENT
Start: 2022-07-29 | End: 2022-07-29 | Stop reason: HOSPADM

## 2022-07-29 RX ORDER — HYDROCHLOROTHIAZIDE 25 MG/1
25 TABLET ORAL
COMMUNITY
Start: 2022-05-25 | End: 2022-12-08

## 2022-07-29 RX ORDER — HYDROMORPHONE HYDROCHLORIDE 1 MG/ML
0.2 INJECTION, SOLUTION INTRAMUSCULAR; INTRAVENOUS; SUBCUTANEOUS EVERY 5 MIN PRN
Status: DISCONTINUED | OUTPATIENT
Start: 2022-07-29 | End: 2022-07-29 | Stop reason: HOSPADM

## 2022-07-29 RX ORDER — ACETAMINOPHEN 650 MG/1
650 SUPPOSITORY RECTAL EVERY 6 HOURS PRN
Status: DISCONTINUED | OUTPATIENT
Start: 2022-07-29 | End: 2022-07-29 | Stop reason: HOSPADM

## 2022-07-29 RX ORDER — HYDROCHLOROTHIAZIDE 25 MG/1
25 TABLET ORAL
Status: DISCONTINUED | OUTPATIENT
Start: 2022-07-29 | End: 2022-07-29 | Stop reason: HOSPADM

## 2022-07-29 RX ORDER — SODIUM CHLORIDE, SODIUM LACTATE, POTASSIUM CHLORIDE, CALCIUM CHLORIDE 600; 310; 30; 20 MG/100ML; MG/100ML; MG/100ML; MG/100ML
INJECTION, SOLUTION INTRAVENOUS CONTINUOUS PRN
Status: DISCONTINUED | OUTPATIENT
Start: 2022-07-29 | End: 2022-07-29

## 2022-07-29 RX ORDER — HALOPERIDOL 5 MG/ML
1 INJECTION INTRAMUSCULAR
Status: DISCONTINUED | OUTPATIENT
Start: 2022-07-29 | End: 2022-07-29 | Stop reason: HOSPADM

## 2022-07-29 RX ADMIN — HYDROCHLOROTHIAZIDE 25 MG: 25 TABLET ORAL at 07:54

## 2022-07-29 RX ADMIN — MONTELUKAST 10 MG: 10 TABLET, FILM COATED ORAL at 07:55

## 2022-07-29 RX ADMIN — FENTANYL CITRATE 100 MCG: 50 INJECTION, SOLUTION INTRAMUSCULAR; INTRAVENOUS at 11:31

## 2022-07-29 RX ADMIN — BUSPIRONE HYDROCHLORIDE 20 MG: 10 TABLET ORAL at 16:23

## 2022-07-29 RX ADMIN — DEXAMETHASONE SODIUM PHOSPHATE 6 MG: 4 INJECTION, SOLUTION INTRA-ARTICULAR; INTRALESIONAL; INTRAMUSCULAR; INTRAVENOUS; SOFT TISSUE at 11:50

## 2022-07-29 RX ADMIN — HYDROXYCHLOROQUINE SULFATE 200 MG: 200 TABLET, FILM COATED ORAL at 07:55

## 2022-07-29 RX ADMIN — HYDROMORPHONE HYDROCHLORIDE 0.2 MG: 1 INJECTION, SOLUTION INTRAMUSCULAR; INTRAVENOUS; SUBCUTANEOUS at 12:40

## 2022-07-29 RX ADMIN — SODIUM CHLORIDE, POTASSIUM CHLORIDE, SODIUM LACTATE AND CALCIUM CHLORIDE: 600; 310; 30; 20 INJECTION, SOLUTION INTRAVENOUS at 13:25

## 2022-07-29 RX ADMIN — KETOROLAC TROMETHAMINE 15 MG: 15 INJECTION, SOLUTION INTRAMUSCULAR; INTRAVENOUS at 07:54

## 2022-07-29 RX ADMIN — DIPHENHYDRAMINE HYDROCHLORIDE 25 MG: 50 INJECTION INTRAMUSCULAR; INTRAVENOUS at 12:37

## 2022-07-29 RX ADMIN — ACETAMINOPHEN 650 MG: 325 TABLET, FILM COATED ORAL at 07:54

## 2022-07-29 RX ADMIN — LIDOCAINE HYDROCHLORIDE 100 MG: 20 INJECTION, SOLUTION INFILTRATION; PERINEURAL at 11:31

## 2022-07-29 RX ADMIN — FERROUS SULFATE TAB 325 MG (65 MG ELEMENTAL FE) 325 MG: 325 (65 FE) TAB at 07:55

## 2022-07-29 RX ADMIN — DIPHENHYDRAMINE HYDROCHLORIDE 25 MG: 50 INJECTION INTRAMUSCULAR; INTRAVENOUS at 12:47

## 2022-07-29 RX ADMIN — HYDROMORPHONE HYDROCHLORIDE 0.2 MG: 1 INJECTION, SOLUTION INTRAMUSCULAR; INTRAVENOUS; SUBCUTANEOUS at 12:35

## 2022-07-29 RX ADMIN — SUCCINYLCHOLINE CHLORIDE 140 MG: 20 INJECTION, SOLUTION INTRAMUSCULAR; INTRAVENOUS; PARENTERAL at 11:31

## 2022-07-29 RX ADMIN — PANTOPRAZOLE SODIUM 40 MG: 40 INJECTION, POWDER, FOR SOLUTION INTRAVENOUS at 07:54

## 2022-07-29 RX ADMIN — ONDANSETRON 4 MG: 2 INJECTION INTRAMUSCULAR; INTRAVENOUS at 11:50

## 2022-07-29 RX ADMIN — HYDROMORPHONE HYDROCHLORIDE 0.2 MG: 1 INJECTION, SOLUTION INTRAMUSCULAR; INTRAVENOUS; SUBCUTANEOUS at 12:20

## 2022-07-29 RX ADMIN — SODIUM CHLORIDE, POTASSIUM CHLORIDE, SODIUM LACTATE AND CALCIUM CHLORIDE: 600; 310; 30; 20 INJECTION, SOLUTION INTRAVENOUS at 11:23

## 2022-07-29 RX ADMIN — PREGABALIN 100 MG: 100 CAPSULE ORAL at 07:55

## 2022-07-29 RX ADMIN — PANTOPRAZOLE SODIUM 40 MG: 40 INJECTION, POWDER, FOR SOLUTION INTRAVENOUS at 06:44

## 2022-07-29 RX ADMIN — ONDANSETRON 4 MG: 4 TABLET, ORALLY DISINTEGRATING ORAL at 07:55

## 2022-07-29 RX ADMIN — DESVENLAFAXINE SUCCINATE 100 MG: 100 TABLET, EXTENDED RELEASE ORAL at 07:55

## 2022-07-29 RX ADMIN — BUSPIRONE HYDROCHLORIDE 20 MG: 10 TABLET ORAL at 07:55

## 2022-07-29 RX ADMIN — MIDAZOLAM 2 MG: 1 INJECTION INTRAMUSCULAR; INTRAVENOUS at 11:21

## 2022-07-29 RX ADMIN — HYDROMORPHONE HYDROCHLORIDE 0.2 MG: 1 INJECTION, SOLUTION INTRAMUSCULAR; INTRAVENOUS; SUBCUTANEOUS at 12:25

## 2022-07-29 RX ADMIN — SODIUM CHLORIDE, POTASSIUM CHLORIDE, SODIUM LACTATE AND CALCIUM CHLORIDE: 600; 310; 30; 20 INJECTION, SOLUTION INTRAVENOUS at 06:45

## 2022-07-29 RX ADMIN — SENNOSIDES AND DOCUSATE SODIUM 2 TABLET: 8.6; 5 TABLET ORAL at 07:55

## 2022-07-29 RX ADMIN — HYDROMORPHONE HYDROCHLORIDE 0.2 MG: 1 INJECTION, SOLUTION INTRAMUSCULAR; INTRAVENOUS; SUBCUTANEOUS at 12:30

## 2022-07-29 RX ADMIN — HYDROMORPHONE HYDROCHLORIDE 0.5 MG: 1 INJECTION, SOLUTION INTRAMUSCULAR; INTRAVENOUS; SUBCUTANEOUS at 13:00

## 2022-07-29 RX ADMIN — HYDROMORPHONE HYDROCHLORIDE 0.2 MG: 0.2 INJECTION, SOLUTION INTRAMUSCULAR; INTRAVENOUS; SUBCUTANEOUS at 06:44

## 2022-07-29 RX ADMIN — PREGABALIN 100 MG: 100 CAPSULE ORAL at 16:24

## 2022-07-29 RX ADMIN — PROPOFOL 200 MG: 10 INJECTION, EMULSION INTRAVENOUS at 11:31

## 2022-07-29 RX ADMIN — HYDROMORPHONE HYDROCHLORIDE 0.5 MG: 1 INJECTION, SOLUTION INTRAMUSCULAR; INTRAVENOUS; SUBCUTANEOUS at 13:10

## 2022-07-29 ASSESSMENT — ENCOUNTER SYMPTOMS
FEVER: 0
COUGH: 0
DIETARY ISSUES: NPO
ARTHRALGIAS: 0
DIFFICULTY URINATING: 0
SORE THROAT: 0
CHEST TIGHTNESS: 0
SEIZURES: 0
HEADACHES: 0
CONFUSION: 0
PALPITATIONS: 0
NAUSEA: 0
SUBJECTIVE PATIENT PAIN CONTROL: PARTIALLY CONTROLLED
DYSURIA: 0
DIARRHEA: 0
EYE PAIN: 0
ACTIVITY IMPAIRMENT: IMPAIRED DUE TO PAIN
CHILLS: 0
SUBJECTIVE PAIN PROGRESSION: UNCHANGED
ABDOMINAL DISTENTION: 0
WEAKNESS: 0
BACK PAIN: 0
COLOR CHANGE: 0
CONSTIPATION: 0
NECK PAIN: 0
SHORTNESS OF BREATH: 0
FREQUENCY: 0
VOMITING: 0
FATIGUE: 0
MYALGIAS: 0
PAIN SEVERITY NOW: MODERATE
ABDOMINAL PAIN: 1
DIZZINESS: 0

## 2022-07-29 NOTE — PROCEDURES
Gastroenterology Endoscopy Suite Brief Operative Note    Procedure:  Upper endoscopy   Post-operative diagnosis:  Foreign body removal   Staff Physician:  Dr. Pamela Perez   Fellow/Assistant(s):  Dr. Efrem Quintanilla    Specimens:  Please see final procedure note for further details.   Findings:  Stiff, coated wire seen in the antrum extending through the pylorus. This was removed with a snare and foreign body simons without complication.    Complications:  None.  EBL: 0 mL   Condition:  Stable   Recommendations  Diet:  Return to previous diet  PPI:  N/A  Anti-coagulants/platelets:  N/A  Octreotide:  N/A  Discharge Planning:   [facility] to [facility] to [facility] for [facility] care. Disposition pending planned psychiatry consultation.
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
## 2022-07-29 NOTE — ED TRIAGE NOTES
Pt arrives ambulatory to triage with reports she swallowed a metal object from the nose piece of the mask. Denies any SI intentions. Has hx of this in the past.
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
## 2022-07-29 NOTE — PROGRESS NOTES
Nevin Alvarado is a 31 year old female patient.  1. Swallowed foreign body, initial encounter      Past Medical History:   Diagnosis Date     ADD (attention deficit disorder)      ADHD      Anorexia nervosa with bulimia     history of; on Topamax     Anxiety      Anxiety      Asthma      Borderline personality disorder      Borderline personality disorder (H)      Depression      Depression      Eating disorder      H/O self-harm      h/o Suicide attempt 02/21/2018     History of pulmonary embolism 12/2019    Provoked. Completed 3 month course of Apixaban     Morbid obesity      Neuropathy      Obesity      PTSD (post-traumatic stress disorder)      PTSD (post-traumatic stress disorder)      Pulmonary emboli (H)      Rectal foreign body - Recurrent issue, self placed      Self-injurious behavior     hx swallowing nonfood items such as mickie pins     Sleep apnea     uses cpap     Suicidal overdose (H)      Swallowed foreign body - Recurrent issue, self placed      Syncope      Current Outpatient Medications   Medication Sig Dispense Refill     albuterol (PROAIR HFA/PROVENTIL HFA/VENTOLIN HFA) 108 (90 Base) MCG/ACT inhaler Inhale 2 puffs into the lungs every 6 hours as needed for shortness of breath / dyspnea or wheezing 18 g 0     busPIRone (BUSPAR) 10 MG tablet Take 2 tablets (20 mg) by mouth 3 times daily 180 tablet 0     cetirizine (ZYRTEC) 10 MG tablet Take 1 tablet (10 mg) by mouth daily 30 tablet 0     Cholecalciferol (VITAMIN D) 50 MCG (2000 UT) CAPS Take 2,000 Units by mouth daily 30 capsule 0     desvenlafaxine (PRISTIQ) 100 MG 24 hr tablet Take 1 tablet (100 mg) by mouth daily 30 tablet 0     diphenhydrAMINE (BENADRYL) 25 MG tablet Take 25 mg by mouth every 6 hours as needed       ferrous sulfate (FEROSUL) 325 (65 Fe) MG tablet Take 1 tablet (325 mg) by mouth daily (with breakfast) 30 tablet 0     hydrochlorothiazide (HYDRODIURIL) 25 MG tablet Take 25 mg by mouth daily before breakfast        hydroxychloroquine (PLAQUENIL) 200 MG tablet Take 1 tablet (200 mg) by mouth 2 times daily 30 tablet 0     hydrOXYzine (ATARAX) 25 MG tablet Take 25 mg by mouth every 6 hours as needed       ibuprofen (ADVIL/MOTRIN) 600 MG tablet Take 600 mg by mouth every 6 hours as needed       lurasidone (LATUDA) 40 MG TABS tablet Take 1 tablet (40 mg) by mouth daily (with dinner) 30 tablet 0     metFORMIN (FORTAMET) 1000 MG 24 hr tablet Take 1 tablet (1,000 mg) by mouth daily (with dinner) 30 tablet 0     montelukast (SINGULAIR) 10 MG tablet Take 10 mg by mouth daily       ondansetron (ZOFRAN-ODT) 4 MG ODT tab Take 1 tablet (4 mg) by mouth every 8 hours as needed for nausea 15 tablet 0     pregabalin (LYRICA) 100 MG capsule Take 1 capsule (100 mg) by mouth 3 times daily 90 capsule 0     SUMAtriptan (IMITREX) 25 MG tablet Take 25 mg by mouth as needed       valACYclovir (VALTREX) 1000 mg tablet Take 2 tablets by mouth two times daily for one day. Use as needed at onset of cold sore.        Respiratory Therapy Supplies (NEBULIZER) BRENDAN Nebulizer device.  Albuterol nebulization every 2 hours as needed for shortness of breath or wheezing. 1 each 0     Allergies   Allergen Reactions     Amoxicillin-Pot Clavulanate Other (See Comments), Swelling and Rash     PN: facial swelling, left side. Also had cortisone injection the same day as she started the Augmentin.  Noted in downtime recovery of chart.    PN: facial swelling, left side. Also had cortisone injection the same day as she started the Augmentin.; HUT Comment: PN: facial swelling, left side. Also had cortisone injection the same day as she started the Augmentin.Noted in downtime recovery of chart.; HUT Reaction: Rash; HUT Reaction: Unknown; HUT Reaction Type: Allergy; HUT Severity: Med; HUT Noted: [id number]     Hydrocodone-Acetaminophen Nausea and Vomiting and Rash     Update on 12/12  Pt says she can take tylenol just not the hydrocodone.   Other reaction(s): Rash       Latex  Rash     HUT Reaction: Rash; HUT Reaction Type: Allergy; HUT Severity: Low; HUT Noted: [id number]  Other reaction(s): Rash       Oseltamivir Hives     med stopped, PN: med stopped  med stopped, PN: med stopped; HUT Comment: med stopped, PN: med stopped; HUT Reaction: Hives; HUT Reaction Type: Allergy; HUT Severity: Med; HUT Noted: [id number]     Penicillins Anaphylaxis     HUT Reaction: Anaphylaxis; HUT Reaction Type: Allergy; HUT Severity: High; HUT Noted: [id number]     Vancomycin Itching, Swelling and Rash     Other reaction(s): Redness  Flushed face, very itchy; HUT Comment: Flushed face, very itchy; HUT Reaction: Angioedema; HUT Reaction: Redness; HUT Severity: Med; HUT Noted: [id number]    facial     Hydrocodone Nausea and Vomiting and GI Disturbance     vomiting for days, PN: vomiting for days; HUT Comment: vomiting for days; HUT Reaction: Gastrointestinal; HUT Reaction: Nausea And Vomiting; HUT Reaction Type: Intolerance; HUT Severity: Med; HUT Noted: [id number]  vomiting for days       Blood-Group Specific Substance Other (See Comments)     Patient has an anti-Cw and non-specific antibodies. Blood product orders may be delayed. Draw one red top and two purple top tubes for all type/screen/crossmatch orders.  Patient has anti-Cw, anti-K ([place]), Warm auto and nonspecific antibodies. Blood products may be delayed. Draw patient 24 hours prior to transfusion. Draw one red top and two purple top tubes for all type and screen orders.     Clavulanic Acid Angioedema     Fentanyl Itching     Naltrexone Other (See Comments)     Reaction(s): Vivid dreams.  Reaction(s): Vivid dreams.    Reaction(s): Vivid dreams.  Reaction(s): Vivid dreams.  Reaction(s): Vivid dreams.  Reaction(s): Vivid dreams.  Reaction(s): Vivid dreams.  Reaction(s): Vivid dreams.  Reaction(s): Vivid dreams.  Reaction(s): Vivid dreams.    Reaction(s): Vivid dreams.  Reaction(s): Vivid dreams.  Reaction(s): Vivid dreams.     Oxycodone Swelling     Adhesive Tape  "Rash     Silicone type  Silicone type     Cephalosporins Rash     Lamotrigine Rash     Possibly associated with Lamictal.   HUT Comment: Possibly associated with Lamictal. ; HUT Reaction: Rash; HUT Reaction Type: Allergy; HUT Severity: Low; HUT Noted: [id number]     Active Problems:    Swallowed foreign body, initial encounter    Blood pressure 109/71, pulse 89, temperature 97.7  F (36.5  C), temperature source Oral, resp. rate 18, height 1.575 m (5' 2\"), weight 135.2 kg (298 lb), SpO2 96 %, not currently breastfeeding.    Subjective:  Symptoms:  Stable.  (Epigastric pain after metal ingestion).    Diet:  NPO.    Activity level: Impaired due to pain.    Pain:  She complains of pain that is moderate.  She reports pain is unchanged.  Pain is partially controlled.      Objective:  General Appearance:  Comfortable.    Vital signs: (most recent): Blood pressure 109/71, pulse 89, temperature 97.7  F (36.5  C), temperature source Oral, resp. rate 18, height 1.575 m (5' 2\"), weight 135.2 kg (298 lb), SpO2 96 %, not currently breastfeeding.  Vital signs are normal.    Output: Producing urine.    HEENT: Normal HEENT exam.    Lungs:  Normal effort and normal respiratory rate.  Breath sounds clear to auscultation.    Heart: Normal rate.  Regular rhythm.  S1 normal and S2 normal.    Abdomen: Abdomen is soft.  Bowel sounds are normal.   There is no abdominal tenderness.     Extremities: Normal range of motion.    Pulses: Distal pulses are intact.    Neurological: Patient is alert.    Pupils:  Pupils are equal, round, and reactive to light.    Skin:  Warm.      Assessment:    Condition: In stable condition.  Unchanged.   (31 yof with complicated MH with self injuries behaviors presents with metal ingestion, here for further work up and managements. GI input appreciated-EGD this am.).     The pt was seen and examined by myself. The case was reviewed and the plan was discussed with the MARYCRUZ.    Mila Diallo MD, MD  7/29/2022    "

## 2022-07-29 NOTE — CONSULTS
GASTROENTEROLOGY CONSULTATION      Luminal consult    Consult requested by Mila Veliz MD    Reason for consult: Foreign body ingestion     History is obtained from the patient    Date of Admission:  7/29/2022         History of Present Illness:   Nevin Alvarado is a 31 year old female with a history of repeated ingestions and insertions of foreign objects, either swallowed, or inserted into the rectum or vagina. She has required numerous endoscopies for retrieval of foreign bodies and has sustained esophageal perforation in the past. Last endoscopy was 7/26/2022 at [facility] when a mask wire was removed. Pt ingested another mask wire yesterday evening, and presented to ED.     Pt endorses some abdominal pain. Last ate yesterday prior to the ingestion. Not on any anticoagulants.     Pt currently living at a crisis house. States one of the other residents can be combative at times, and this has greatly increased her anxiety. Additionally, she has not been able to see her therapist as often as she would like.             Social History:     Social History     Socioeconomic History     Marital status: Single     Spouse name: Not on file     Number of children: Not on file     Years of education: Not on file     Highest education level: Not on file   Occupational History     Occupation: On disability   Tobacco Use     Smoking status: Never Smoker     Smokeless tobacco: Never Used   Vaping Use     Vaping Use: Not on file   Substance and Sexual Activity     Alcohol use: No     Alcohol/week: 0.0 standard drinks     Drug use: No     Sexual activity: Not Currently     Partners: Male     Birth control/protection: I.U.D.     Comment: IUD - Mirena - placed July, 2015   Other Topics Concern     Parent/sibling w/ CABG, MI or angioplasty before 65F 55M? Not Asked   Social History Narrative    Single.    Living in independent living portion of [facility].    On disability.    No regular exercise.      Social  Determinants of Health     Financial Resource Strain: Not on file   Food Insecurity: Not on file   Transportation Needs: Not on file   Physical Activity: Not on file   Stress: Not on file   Social Connections: Not on file   Intimate Partner Violence: Not on file   Housing Stability: Not on file            Family History:     Family History   Problem Relation Age of Onset     Diabetes Type 2  Maternal Grandmother      Diabetes Type 2  Paternal Grandmother      Breast Cancer Paternal Grandmother      Cerebrovascular Disease Father 53     Myocardial Infarction No family hx of      Coronary Artery Disease Early Onset No family hx of      Ovarian Cancer No family hx of      Colon Cancer No family hx of      Depression Mother      Anxiety Disorder Mother                Past Medical, Surgical and Procedural History (Summarized):   Pertinent Medical History:  Past Medical History:   Diagnosis Date     ADD (attention deficit disorder)      ADHD      Anorexia nervosa with bulimia     history of; on Topamax     Anxiety      Anxiety      Asthma      Borderline personality disorder      Borderline personality disorder (H)      Depression      Depression      Eating disorder      H/O self-harm      h/o Suicide attempt 02/21/2018     History of pulmonary embolism 12/2019    Provoked. Completed 3 month course of Apixaban     Morbid obesity      Neuropathy      Obesity      PTSD (post-traumatic stress disorder)      PTSD (post-traumatic stress disorder)      Pulmonary emboli (H)      Rectal foreign body - Recurrent issue, self placed      Self-injurious behavior     hx swallowing nonfood items such as mickie pins     Sleep apnea     uses cpap     Suicidal overdose (H)      Swallowed foreign body - Recurrent issue, self placed      Syncope          Abdominal and Pelvis Surgeries:    44 EGD in [place] system, mostly for foreign body removal.         Medications:     Current Facility-Administered Medications   Medication      acetaminophen (TYLENOL) tablet 650 mg    Or     acetaminophen (TYLENOL) Suppository 650 mg     busPIRone (BUSPAR) tablet 20 mg     cetirizine (zyrTEC) tablet 10 mg     desvenlafaxine (PRISTIQ) 24 hr tablet 100 mg     glucose gel 15-30 g    Or     dextrose 50 % injection 25-50 mL    Or     glucagon injection 1 mg     diphenhydrAMINE (BENADRYL) capsule 25 mg     ferrous sulfate (FEROSUL) tablet 325 mg     hydrochlorothiazide (HYDRODIURIL) tablet 25 mg     hydroxychloroquine (PLAQUENIL) tablet 200 mg     hydrOXYzine (ATARAX) tablet 25 mg     ibuprofen (ADVIL/MOTRIN) tablet 600 mg     ketorolac (TORADOL) injection 15 mg     lactated ringers infusion     lurasidone (LATUDA) tablet 40 mg     melatonin tablet 1 mg     montelukast (SINGULAIR) tablet 10 mg     ondansetron (ZOFRAN ODT) ODT tab 4 mg    Or     ondansetron (ZOFRAN) injection 4 mg     pantoprazole (PROTONIX) IV push injection 40 mg     pregabalin (LYRICA) capsule 100 mg     prochlorperazine (COMPAZINE) injection 10 mg    Or     prochlorperazine (COMPAZINE) tablet 10 mg    Or     prochlorperazine (COMPAZINE) suppository 25 mg     senna-docusate (SENOKOT-S/PERICOLACE) 8.6-50 MG per tablet 1 tablet    Or     senna-docusate (SENOKOT-S/PERICOLACE) 8.6-50 MG per tablet 2 tablet     Current Outpatient Medications   Medication Sig     albuterol (PROAIR HFA/PROVENTIL HFA/VENTOLIN HFA) 108 (90 Base) MCG/ACT inhaler Inhale 2 puffs into the lungs every 6 hours as needed for shortness of breath / dyspnea or wheezing     busPIRone (BUSPAR) 10 MG tablet Take 2 tablets (20 mg) by mouth 3 times daily     cetirizine (ZYRTEC) 10 MG tablet Take 1 tablet (10 mg) by mouth daily     Cholecalciferol (VITAMIN D) 50 MCG (2000 UT) CAPS Take 2,000 Units by mouth daily     desvenlafaxine (PRISTIQ) 100 MG 24 hr tablet Take 1 tablet (100 mg) by mouth daily     diphenhydrAMINE (BENADRYL) 25 MG tablet Take 25 mg by mouth every 6 hours as needed     ferrous sulfate (FEROSUL) 325 (65 Fe) MG tablet  "Take 1 tablet (325 mg) by mouth daily (with breakfast)     hydrochlorothiazide (HYDRODIURIL) 25 MG tablet Take 25 mg by mouth daily before breakfast     hydroxychloroquine (PLAQUENIL) 200 MG tablet Take 1 tablet (200 mg) by mouth 2 times daily     hydrOXYzine (ATARAX) 25 MG tablet Take 25 mg by mouth every 6 hours as needed     ibuprofen (ADVIL/MOTRIN) 600 MG tablet Take 600 mg by mouth every 6 hours as needed     lurasidone (LATUDA) 40 MG TABS tablet Take 1 tablet (40 mg) by mouth daily (with dinner)     metFORMIN (FORTAMET) 1000 MG 24 hr tablet Take 1 tablet (1,000 mg) by mouth daily (with dinner)     montelukast (SINGULAIR) 10 MG tablet Take 10 mg by mouth daily     ondansetron (ZOFRAN-ODT) 4 MG ODT tab Take 1 tablet (4 mg) by mouth every 8 hours as needed for nausea     pregabalin (LYRICA) 100 MG capsule Take 1 capsule (100 mg) by mouth 3 times daily     SUMAtriptan (IMITREX) 25 MG tablet Take 25 mg by mouth as needed     valACYclovir (VALTREX) 1000 mg tablet Take 2 tablets by mouth two times daily for one day. Use as needed at onset of cold sore.      Respiratory Therapy Supplies (NEBULIZER) BRENDAN Nebulizer device.  Albuterol nebulization every 2 hours as needed for shortness of breath or wheezing.            Previous Endoscopy:   Recent Endoscopic History:    EGD - 7/26/2022 FB removal (mask wire), 7/22/2022 FB removal (mask wire), 7/16/2022 FB removal (Paper clip)         Review of Systems:   A complete 10 point review of systems was obtained.  Please see the HPI for pertinent positives and negatives.  All other systems were reviewed and were found to be negative.          Physical Exam:   /71 (BP Location: Right arm)   Pulse 89   Temp 97.7  F (36.5  C) (Oral)   Resp 18   Ht 1.575 m (5' 2\")   Wt 135.2 kg (298 lb)   SpO2 96%   BMI 54.50 kg/m    Wt:   Wt Readings from Last 2 Encounters:   07/28/22 135.2 kg (298 lb)   07/05/22 118.4 kg (261 lb)      Constitutional: Cooperative, pleasant, no apparent " distress.  HEENT: No conjunctival icterus, moist mucus membranes.  CV: Normal rate.  No edema.  Respiratory: Unlabored breathing  Abdomen:   Non-distended  Non-tender  Skin: No jaundice, warm  Neuro: Alert, moves all extremities  Psych: Normal affect, answers questions appropriately.          Data (Summarized):     Labs reviewed:  Hgb - 12.3  Plts - 221  INR - 1.0    Imaging reviewed:  EXAM: XR ABDOMEN 1 VIEW                                                                   IMPRESSION: There is a 15 cm linear metallic density focus projecting transversely over the mid upper abdomen consistent with a foreign body. Cholecystectomy clips. Nonobstructive bowel gas pattern. No gross evidence of free air on these supine images.          ASSESSMENT AND PLAN:   Nevin Alvarado is a 31 year old female with a history of repeated ingestions and insertions of foreign objects, either swallowed, or inserted into the rectum or vagina. She has required numerous endoscopies for retrieval of foreign bodies and has sustained esophageal perforation in the past. Last endoscopy was 7/26/2022 at [facility] when a mask wire was removed. GI  consulted in the context of a repeat foreign body ingestion.    Foreign body ingestion    Pt presented to ED last evening after ingesting a a wire from a surgical mask. 14cm wire noted on abdominal X-ray. On EGD, stiff, coated wire seen in the antrum extending through the pylorus. Wire removed with a snare and foreign body simons without complication. This is pt's 3rd intentional ingestion in the past week. Pt endorses increased stress and anxiety with her current living situation, likely contributing to her recent increase in frequency of ingestions. Appreciate psychiatry recommendations.          Recommendations:  -- Restart previous Diet  -- Disposition pending psychiatry recommendations.    -- GI will sign off.     The case was staffed with attending, Dr. Chris De Dios,  MS4          I performed a history and physical examination of the above patient and discussed the management with Barney De Dios, MS4  on 7/29/2022. I reviewed the note and there are no changes to the past medical, family or social history.  A complete 10 point review of systems was obtained. Please see the HPI for pertinent positives and negatives. All other systems were reviewed and were found to be negative. I agree with the documented findings and plan of care as outlined with the following additions:    -- To OR today for EGD for foreign body removal  (Update, EGD showed wire in the stomach, removed with simons).  -- Return to ED after EGD with plans for psychiatry evaluation per the ED note  -- GI will sign off , ok for regular diet    Staffed with attending, Dr. Perez who agrees with this assessment and plan.    Efrem Quintanilla MD  Gastroenterology Fellow, PGY-6
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
## 2022-07-29 NOTE — PROGRESS NOTES
[place] [employer] Aaliyah Shepard is following her case. Her Number is [phone]. Please contact her with any updates or concerns.

## 2022-07-29 NOTE — PLAN OF CARE
Goal Outcome Evaluation:    -diagnostic tests and consults completed and resulted-Met  -vital signs normal or at patient baseline- Progressing; Hypertensive  -tolerating oral intake to maintain hydration- Met  -adequate pain control on oral analgesics- Met  -dyspnea improved and O2 sats greater than 88% on room air or prior home oxygen levels- Met  -returns to baseline functional status- Progressing   -safe disposition plan has been identified- Met   -SW consult complete- Met  -GI consult complete-Met   -Psych consult complete-Met     Nurse to notify provider when observation goals have been met and patient is ready for discharge.

## 2022-07-29 NOTE — PROGRESS NOTES
Discharge paperwork reviewed with patient. Pt has no other questions and understands. IV removed prior to leaving. Pt belongings sent with.

## 2022-07-29 NOTE — ED PROVIDER NOTES
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 content does not include homicidal or suicidal ideation.         Cognition and Memory: Cognition normal.         Judgment: Judgment is impulsive and inappropriate.         ED Course       Results for orders placed or performed during the hospital encounter of 07/29/22 (from the past 24 hour(s))   XR Chest 1 View    Narrative    EXAM: XR CHEST 1 VIEW  LOCATION: [facility]  DATE/TIME: 7/28/2022 11:17 PM    INDICATION: Swallowed foreign " body, wire out of mask.  COMPARISON: 7/9/2022      Impression    IMPRESSION: Chest is negative. No evidence for radiopaque foreign body within the chest. However, there appears to be a linear metallic radiopaque foreign body in the upper abdomen. See separate abdominal x-ray report for those details.   XR Abdomen 1 View    Narrative    EXAM: XR ABDOMEN 1 VIEW  LOCATION: [facility]  DATE/TIME: 7/28/2022 11:17 PM    INDICATION: swallowed foreign body   wire out of mask  COMPARISON: 07/09/2022      Impression    IMPRESSION: There is a 15 cm linear metallic density focus projecting transversely over the mid upper abdomen consistent with a foreign body. Cholecystectomy clips. Nonobstructive bowel gas pattern. No gross evidence of free air on these supine images.    Asymptomatic COVID-19 Virus (Coronavirus) by PCR Nasopharyngeal    Specimen: Nasopharyngeal; Swab   Result Value Ref Range    SARS CoV2 PCR Negative Negative, Testing sent to reference lab. Results will be returned via unsolicited result    Narrative    Testing was performed using the Xpert Xpress SARS-CoV-2 Assay on the  Cepheid Gene-Xpert Instrument Systems. Additional information about  this Emergency Use Authorization (EUA) assay can be found via the Lab  Guide. This test should be ordered for the detection of SARS-CoV-2 in  individuals who meet SARS-CoV-2 clinical and/or epidemiological  criteria. Test performance is unknown in asymptomatic patients. This  test is for in vitro diagnostic use under the FDA EUA for  laboratories certified under CLIA to perform high complexity testing.  This test has not been FDA cleared or approved. A negative result  does not rule out the presence of PCR inhibitors in the specimen or  target RNA in concentration below the limit of detection for the  assay. The possibility of a false negative should be considered if  the patient's recent exposure or clinical presentation  suggests  COVID-19. This test was validated by the [facility]. This laboratory is certified under  the Clinical Laboratory Improvement Amendments of 1988 (CLIA-88) as  qualified to perform high complexity laboratory testing.     CBC with Platelets & Differential    Narrative    The following orders were created for panel order CBC with Platelets & Differential.  Procedure                               Abnormality         Status                     ---------                               -----------         ------                     CBC with platelets and d...[[id number]]                      Final result                 Please view results for these tests on the individual orders.   INR   Result Value Ref Range    INR 1.00 0.85 - 1.15   CBC with platelets and differential   Result Value Ref Range    WBC Count 5.8 4.0 - 11.0 10e3/uL    RBC Count 4.17 3.80 - 5.20 10e6/uL    Hemoglobin 12.3 11.7 - 15.7 g/dL    Hematocrit 37.4 35.0 - 47.0 %    MCV 90 78 - 100 fL    MCH 29.5 26.5 - 33.0 pg    MCHC 32.9 31.5 - 36.5 g/dL    RDW 13.4 10.0 - 15.0 %    Platelet Count 221 150 - 450 10e3/uL    % Neutrophils 57 %    % Lymphocytes 30 %    % Monocytes 9 %    % Eosinophils 3 %    % Basophils 1 %    % Immature Granulocytes 0 %    NRBCs per 100 WBC 0 <1 /100    Absolute Neutrophils 3.4 1.6 - 8.3 10e3/uL    Absolute Lymphocytes 1.7 0.8 - 5.3 10e3/uL    Absolute Monocytes 0.5 0.0 - 1.3 10e3/uL    Absolute Eosinophils 0.2 0.0 - 0.7 10e3/uL    Absolute Basophils 0.0 0.0 - 0.2 10e3/uL    Absolute Immature Granulocytes 0.0 <=0.4 10e3/uL    Absolute NRBCs 0.0 10e3/uL     *Note: Due to a large number of results and/or encounters for the requested time period, some results have not been displayed. A complete set of results can be found in Results Review.     Medications   busPIRone (BUSPAR) tablet 20 mg ( Oral Unhold 7/29/22 1429)   cetirizine (zyrTEC) tablet 10 mg ( Oral Unhold 7/29/22 1429)    desvenlafaxine (PRISTIQ) 24 hr tablet 100 mg ( Oral Unhold 7/29/22 1429)   ferrous sulfate (FEROSUL) tablet 325 mg ( Oral Unhold 7/29/22 1429)   hydroxychloroquine (PLAQUENIL) tablet 200 mg ( Oral Unhold 7/29/22 1429)   lurasidone (LATUDA) tablet 40 mg ( Oral Unhold 7/29/22 1429)   pregabalin (LYRICA) capsule 100 mg ( Oral Unhold 7/29/22 1429)   diphenhydrAMINE (BENADRYL) capsule 25 mg ( Oral Unhold 7/29/22 1429)   hydrochlorothiazide (HYDRODIURIL) tablet 25 mg ( Oral Unhold 7/29/22 1429)   hydrOXYzine (ATARAX) tablet 25 mg ( Oral Unhold 7/29/22 1429)   ibuprofen (ADVIL/MOTRIN) tablet 600 mg ( Oral Unhold 7/29/22 1429)   montelukast (SINGULAIR) tablet 10 mg ( Oral Unhold 7/29/22 1429)   lactated ringers infusion ( Intravenous New Bag 7/29/22 1325)   melatonin tablet 1 mg ( Oral Unhold 7/29/22 1429)   ondansetron (ZOFRAN ODT) ODT tab 4 mg ( Oral Unhold 7/29/22 1429)     Or   ondansetron (ZOFRAN) injection 4 mg ( Intravenous Unhold 7/29/22 1429)   acetaminophen (TYLENOL) tablet 650 mg ( Oral Unhold 7/29/22 1429)     Or   acetaminophen (TYLENOL) Suppository 650 mg ( Rectal Unhold 7/29/22 1429)   senna-docusate (SENOKOT-S/PERICOLACE) 8.6-50 MG per tablet 1 tablet ( Oral Unhold 7/29/22 1429)     Or   senna-docusate (SENOKOT-S/PERICOLACE) 8.6-50 MG per tablet 2 tablet ( Oral Unhold 7/29/22 1429)   prochlorperazine (COMPAZINE) injection 10 mg ( Intravenous Unhold 7/29/22 1429)     Or   prochlorperazine (COMPAZINE) tablet 10 mg ( Oral Unhold 7/29/22 1429)     Or   prochlorperazine (COMPAZINE) suppository 25 mg ( Rectal Unhold 7/29/22 1429)   ketorolac (TORADOL) injection 15 mg ( Intravenous Unhold 7/29/22 1429)   pantoprazole (PROTONIX) IV push injection 40 mg ( Intravenous Unhold 7/29/22 1429)   glucose gel 15-30 g ( Oral Unhold 7/29/22 1429)     Or   dextrose 50 % injection 25-50 mL ( Intravenous Unhold 7/29/22 1429)     Or   glucagon injection 1 mg ( Subcutaneous Unhold 7/29/22 1429)   naloxone (NARCAN) injection 0.2  mg (has no administration in time range)   naloxone (NARCAN) injection 0.4 mg (has no administration in time range)   naloxone (NARCAN) injection 0.2 mg (has no administration in time range)   naloxone (NARCAN) injection 0.4 mg (has no administration in time range)   flumazenil (ROMAZICON) injection 0.2 mg (has no administration in time range)   HYDROmorphone (DILAUDID) injection 0.2 mg (0.2 mg Intravenous Given 7/29/22 0635)   diphenhydrAMINE (BENADRYL) injection 25 mg (25 mg Intravenous Given 7/29/22 1247)   HYDROmorphone (PF) (DILAUDID) injection 0.5 mg (0.5 mg Intravenous Given 7/29/22 1310)             Assessments & Plan (with Medical Decision Making)   Patient presents to the ED with foreign body ingestion.  Has multiple visits for the same.  Denies active suicidality and states she can keep her self safe in the ED.  Was placed on one-to-one monitoring.    X-ray shows 14 cm likely wire in the stomach.  Case discussed with GI.  No indication for emergent removal.  Recommend admission to observation and will perform endoscopy in the morning.    I have reviewed the nursing notes.    I have reviewed the findings, diagnosis, plan and need for follow up with the patient.    Current Discharge Medication List          Final diagnoses:   Swallowed foreign body, initial encounter       7/28/2022   [facility] EMERGENCY DEPARTMENT     David Frias, DO  07/29/22 2768

## 2022-07-29 NOTE — ANESTHESIA CARE TRANSFER NOTE
Patient: Nevin Alvarado    Procedure: Procedure(s):  ESOPHAGOGASTRODUODENOSCOPY (EGD) WITH FOREIGN BODY REMOVAL       Diagnosis: Foreign body (FB) in soft tissue [M79.5]  Diagnosis Additional Information: No value filed.    Anesthesia Type:   General     Note:    Oropharynx: oropharynx clear of all foreign objects and spontaneously breathing  Level of Consciousness: awake  Oxygen Supplementation: face mask  Level of Supplemental Oxygen (L/min / FiO2): 6  Independent Airway: airway patency satisfactory and stable  Dentition: dentition unchanged  Vital Signs Stable: post-procedure vital signs reviewed and stable  Report to RN Given: handoff report given  Patient transferred to: PACU    Handoff Report: Identifed the Patient, Identified the Reponsible Provider, Reviewed the pertinent medical history, Discussed the surgical course, Reviewed Intra-OP anesthesia mangement and issues during anesthesia, Set expectations for post-procedure period and Allowed opportunity for questions and acknowledgement of understanding      Vitals:  Vitals Value Taken Time   BP     Temp     Pulse     Resp     SpO2 100 % 07/29/22 1210   Vitals shown include unvalidated device data.    Electronically Signed By: Fausto Anderson MD  July 29, 2022  12:10 PM
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
## 2022-07-29 NOTE — DISCHARGE INSTRUCTIONS
[facility] initial intake appointment has been scheduled for 8/4/22 at 10:00am via telehealth and an e-mail with the link will be sent the day of the appointment. With any questions or concern the patient can call [phone].
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
## 2022-07-29 NOTE — ANESTHESIA PROCEDURE NOTES
Airway       Patient location during procedure: OR       Procedure Start/Stop Times: 7/29/2022 11:34 AM  Staff -        Anesthesiologist:  Mary Irvin MD       Resident/Fellow: Fausto Anderson MD       Performed By: resident  Consent for Airway        Urgency: elective  Indications and Patient Condition       Indications for airway management: isma-procedural       Induction type:RSI       Mask difficulty assessment: 0 - not attempted    Final Airway Details       Final airway type: endotracheal airway       Successful airway: ETT - single  Endotracheal Airway Details        Cuffed: yes       Successful intubation technique: video laryngoscopy       VL Blade Size: Glidescope 4       Grade View of Cords: 1       Adjucts: stylet       Position: Right       Measured from: lips    Post intubation assessment        Number of attempts at approach: 1       Secured with: pink tape       Ease of procedure: easy       Dentition: Intact and Unchanged    Medication(s) Administered   Medication Administration Time: 7/29/2022 11:34 AM

## 2022-07-29 NOTE — ANESTHESIA POSTPROCEDURE EVALUATION
Patient: Nevin Alvarado    Procedure: Procedure(s):  ESOPHAGOGASTRODUODENOSCOPY (EGD) WITH FOREIGN BODY REMOVAL       Anesthesia Type:  General    Note:  Disposition: Inpatient   Postop Pain Control: Uneventful            Sign Out: Pain at Preoperative BASELINE   PONV: No   Neuro/Psych: Uneventful            Sign Out: Acceptable/Baseline neuro status   Airway/Respiratory: Uneventful            Sign Out: Acceptable/Baseline resp. status   CV/Hemodynamics: Uneventful            Sign Out: Acceptable CV status   Other NRE: NONE   DID A NON-ROUTINE EVENT OCCUR? No           Last vitals:  Vitals Value Taken Time   /103 07/29/22 1245   Temp 36.7  C (98  F) 07/29/22 1215   Pulse 106 07/29/22 1246   Resp     SpO2 90 % 07/29/22 1246   Vitals shown include unvalidated device data.    Electronically Signed By: Mary Irvin MD  July 29, 2022  12:48 PM

## 2022-07-29 NOTE — H&P
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 (325 mg) by mouth daily (with breakfast)   hydrOXYzine (ATARAX) 25 MG tablet   Yes No   Sig: Take 25 mg by mouth every 6 hours as needed   hydrochlorothiazide (HYDRODIURIL) 25 MG tablet   Yes Yes   Sig: Take 25 mg by mouth daily before breakfast   hydroxychloroquine (PLAQUENIL) 200 MG tablet   No No   Sig: Take 1 tablet (200 mg) by mouth 2 times daily   ibuprofen  (ADVIL/MOTRIN) 600 MG tablet   Yes Yes   Sig: Take 600 mg by mouth every 6 hours as needed   lurasidone (LATUDA) 40 MG TABS tablet   No No   Sig: Take 1 tablet (40 mg) by mouth daily (with dinner)   metFORMIN (FORTAMET) 1000 MG 24 hr tablet   No No   Sig: Take 1 tablet (1,000 mg) by mouth daily (with dinner)   montelukast (SINGULAIR) 10 MG tablet   Yes Yes   Sig: Take 10 mg by mouth daily   ondansetron (ZOFRAN-ODT) 4 MG ODT tab   No No   Sig: Take 1 tablet (4 mg) by mouth every 8 hours as needed for nausea   pregabalin (LYRICA) 100 MG capsule   No No   Sig: Take 1 capsule (100 mg) by mouth 3 times daily   valACYclovir (VALTREX) 1000 mg tablet   Yes No   Sig: Take 2 tablets by mouth two times daily for one day. Use as needed at onset of cold sore.       Facility-Administered Medications: None     Allergies   Allergies   Allergen Reactions     Amoxicillin-Pot Clavulanate Other (See Comments), Swelling and Rash     PN: facial swelling, left side. Also had cortisone injection the same day as she started the Augmentin.  Noted in downtime recovery of chart.    PN: facial swelling, left side. Also had cortisone injection the same day as she started the Augmentin.; HUT Comment: PN: facial swelling, left side. Also had cortisone injection the same day as she started the Augmentin.Noted in downtime recovery of chart.; HUT Reaction: Rash; HUT Reaction: Unknown; HUT Reaction Type: Allergy; HUT Severity: Med; HUT Noted: [id number]     Hydrocodone-Acetaminophen Nausea and Vomiting and Rash     Update on 12/12  Pt says she can take tylenol just not the hydrocodone.   Other reaction(s): Rash       Latex Rash     HUT Reaction: Rash; HUT Reaction Type: Allergy; HUT Severity: Low; HUT Noted: [id number]  Other reaction(s): Rash       Oseltamivir Hives     med stopped, PN: med stopped  med stopped, PN: med stopped; HUT Comment: med stopped, PN: med stopped; HUT Reaction: Hives; HUT Reaction Type: Allergy; HUT Severity: Med; HUT Noted:  [id number]     Penicillins Anaphylaxis     HUT Reaction: Anaphylaxis; HUT Reaction Type: Allergy; HUT Severity: High; HUT Noted: [id number]     Vancomycin Itching, Swelling and Rash     Other reaction(s): Redness  Flushed face, very itchy; HUT Comment: Flushed face, very itchy; HUT Reaction: Angioedema; HUT Reaction: Redness; HUT Severity: Med; HUT Noted: [id number]    facial     Hydrocodone Nausea and Vomiting and GI Disturbance     vomiting for days, PN: vomiting for days; HUT Comment: vomiting for days; HUT Reaction: Gastrointestinal; HUT Reaction: Nausea And Vomiting; HUT Reaction Type: Intolerance; HUT Severity: Med; HUT Noted: [id number]  vomiting for days       Blood-Group Specific Substance Other (See Comments)     Patient has an anti-Cw and non-specific antibodies. Blood product orders may be delayed. Draw one red top and two purple top tubes for all type/screen/crossmatch orders.  Patient has anti-Cw, anti-K ([place]), Warm auto and nonspecific antibodies. Blood products may be delayed. Draw patient 24 hours prior to transfusion. Draw one red top and two purple top tubes for all type and screen orders.     Clavulanic Acid Angioedema     Fentanyl Itching     Naltrexone Other (See Comments)     Reaction(s): Vivid dreams.  Reaction(s): Vivid dreams.    Reaction(s): Vivid dreams.  Reaction(s): Vivid dreams.  Reaction(s): Vivid dreams.  Reaction(s): Vivid dreams.  Reaction(s): Vivid dreams.  Reaction(s): Vivid dreams.  Reaction(s): Vivid dreams.  Reaction(s): Vivid dreams.    Reaction(s): Vivid dreams.  Reaction(s): Vivid dreams.  Reaction(s): Vivid dreams.     Oxycodone Swelling     Adhesive Tape Rash     Silicone type  Silicone type     Cephalosporins Rash     Lamotrigine Rash     Possibly associated with Lamictal.   HUT Comment: Possibly associated with Lamictal. ; HUT Reaction: Rash; HUT Reaction Type: Allergy; HUT Severity: Low; HUT Noted: [id number]       Physical Exam   Vital Signs: Temp: 98.3  F (36.8  C) Temp src:  Temporal BP: (!) 129/110 Pulse: 90   Resp: 16 SpO2: 98 % O2 Device: None (Room air)    Weight: 298 lbs 0 oz    Constitutional: awake, alert, cooperative, no apparent distress, and appears stated age  Eyes: Lids and lashes normal, pupils equal, round and reactive to light, extra ocular muscles intact, sclera clear, conjunctiva normal  ENT: Normocephalic, without obvious abnormality, atraumatic, sinuses nontender on palpation, external ears without lesions, oral pharynx with moist mucous membranes, tonsils without erythema or exudates, gums normal and good dentition.  Hematologic / Lymphatic: no cervical lymphadenopathy  Respiratory: No increased work of breathing, good air exchange, clear to auscultation bilaterally, no crackles or wheezing  Cardiovascular: Normal apical impulse, regular rate and rhythm, normal S1 and S2, no S3 or S4, and no murmur noted  GI: No scars, normal bowel sounds, soft, non-distended, non-tender, no masses palpated, no hepatosplenomegally  Skin: no bruising or bleeding  Musculoskeletal: There is no redness, warmth, or swelling of the joints.  Full range of motion noted.  Motor strength is 5 out of 5 all extremities bilaterally.  Tone is normal.  Neurologic: Awake, alert, oriented to name, place and time.  Cranial nerves II-XII are grossly intact.  Motor is 5 out of 5 bilaterally.  Cerebellar finger to nose, heel to shin intact.  Sensory is intact.  Babinski down going, Romberg negative, and gait is normal.  Neuropsychiatric: General: normal, calm and normal eye contact     Data   Data reviewed today: I reviewed all medications, new labs and imaging results over the last 24 hours. I personally reviewed  No lab results found in last 7 days.      Recent Results (from the past 24 hour(s))   XR Chest 1 View    Narrative    EXAM: XR CHEST 1 VIEW  LOCATION: [facility]  DATE/TIME: 7/28/2022 11:17 PM    INDICATION: Swallowed foreign body, wire out of  mask.  COMPARISON: 7/9/2022      Impression    IMPRESSION: Chest is negative. No evidence for radiopaque foreign body within the chest. However, there appears to be a linear metallic radiopaque foreign body in the upper abdomen. See separate abdominal x-ray report for those details.   XR Abdomen 1 View    Narrative    EXAM: XR ABDOMEN 1 VIEW  LOCATION: [facility]  DATE/TIME: 7/28/2022 11:17 PM    INDICATION: swallowed foreign body   wire out of mask  COMPARISON: 07/09/2022      Impression    IMPRESSION: There is a 15 cm linear metallic density focus projecting transversely over the mid upper abdomen consistent with a foreign body. Cholecystectomy clips. Nonobstructive bowel gas pattern. No gross evidence of free air on these supine images.

## 2022-07-30 DIAGNOSIS — Z71.89 OTHER SPECIFIED COUNSELING: ICD-10-CM

## 2022-07-31 ENCOUNTER — PATIENT OUTREACH (OUTPATIENT)
Dept: CARE COORDINATION | Facility: CLINIC | Age: 32
End: 2022-07-31

## 2022-08-02 ENCOUNTER — TELEPHONE (OUTPATIENT)
Dept: BEHAVIORAL HEALTH | Facility: CLINIC | Age: 32
End: 2022-08-02

## 2022-08-06 ENCOUNTER — HOSPITAL ENCOUNTER (EMERGENCY)
Facility: CLINIC | Age: 32
Discharge: HOME | End: 2022-08-06
Attending: EMERGENCY MEDICINE | Admitting: EMERGENCY MEDICINE
Payer: COMMERCIAL

## 2022-08-06 ENCOUNTER — APPOINTMENT (OUTPATIENT)
Dept: GENERAL RADIOLOGY | Facility: CLINIC | Age: 32
End: 2022-08-06
Attending: EMERGENCY MEDICINE
Payer: COMMERCIAL

## 2022-08-06 VITALS
TEMPERATURE: 98.1 F | HEIGHT: 62 IN | WEIGHT: 293 LBS | DIASTOLIC BLOOD PRESSURE: 119 MMHG | OXYGEN SATURATION: 98 % | SYSTOLIC BLOOD PRESSURE: 185 MMHG | HEART RATE: 137 BPM | RESPIRATION RATE: 18 BRPM | BODY MASS INDEX: 53.92 KG/M2

## 2022-08-06 DIAGNOSIS — T18.9XXA SWALLOWED FOREIGN BODY, INITIAL ENCOUNTER: ICD-10-CM

## 2022-08-06 LAB
SARS-COV-2 RNA RESP QL NAA+PROBE: NEGATIVE
UPPER GI ENDOSCOPY: NORMAL

## 2022-08-06 PROCEDURE — 96374 THER/PROPH/DIAG INJ IV PUSH: CPT | Mod: 59

## 2022-08-06 PROCEDURE — 96375 TX/PRO/DX INJ NEW DRUG ADDON: CPT

## 2022-08-06 PROCEDURE — C9803 HOPD COVID-19 SPEC COLLECT: HCPCS

## 2022-08-06 PROCEDURE — 250N000011 HC RX IP 250 OP 636

## 2022-08-06 PROCEDURE — 96361 HYDRATE IV INFUSION ADD-ON: CPT

## 2022-08-06 PROCEDURE — U0003 INFECTIOUS AGENT DETECTION BY NUCLEIC ACID (DNA OR RNA); SEVERE ACUTE RESPIRATORY SYNDROME CORONAVIRUS 2 (SARS-COV-2) (CORONAVIRUS DISEASE [COVID-19]), AMPLIFIED PROBE TECHNIQUE, MAKING USE OF HIGH THROUGHPUT TECHNOLOGIES AS DESCRIBED BY CMS-2020-01-R: HCPCS | Performed by: EMERGENCY MEDICINE

## 2022-08-06 PROCEDURE — 258N000003 HC RX IP 258 OP 636: Performed by: EMERGENCY MEDICINE

## 2022-08-06 PROCEDURE — G0500 MOD SEDAT ENDO SERVICE >5YRS: HCPCS | Performed by: INTERNAL MEDICINE

## 2022-08-06 PROCEDURE — 250N000011 HC RX IP 250 OP 636: Performed by: INTERNAL MEDICINE

## 2022-08-06 PROCEDURE — 250N000011 HC RX IP 250 OP 636: Performed by: EMERGENCY MEDICINE

## 2022-08-06 PROCEDURE — 43247 EGD REMOVE FOREIGN BODY: CPT | Performed by: INTERNAL MEDICINE

## 2022-08-06 PROCEDURE — 74018 RADEX ABDOMEN 1 VIEW: CPT

## 2022-08-06 PROCEDURE — 99285 EMERGENCY DEPT VISIT HI MDM: CPT | Mod: 25

## 2022-08-06 RX ORDER — DIPHENHYDRAMINE HYDROCHLORIDE 50 MG/ML
50 INJECTION INTRAMUSCULAR; INTRAVENOUS ONCE
Status: COMPLETED | OUTPATIENT
Start: 2022-08-06 | End: 2022-08-06

## 2022-08-06 RX ORDER — FENTANYL CITRATE 50 UG/ML
INJECTION, SOLUTION INTRAMUSCULAR; INTRAVENOUS
Status: DISCONTINUED
Start: 2022-08-06 | End: 2022-08-06 | Stop reason: WASHOUT

## 2022-08-06 RX ORDER — FENTANYL CITRATE 50 UG/ML
100 INJECTION, SOLUTION INTRAMUSCULAR; INTRAVENOUS ONCE
Status: COMPLETED | OUTPATIENT
Start: 2022-08-06 | End: 2022-08-06

## 2022-08-06 RX ORDER — ONDANSETRON 2 MG/ML
INJECTION INTRAMUSCULAR; INTRAVENOUS
Status: DISCONTINUED
Start: 2022-08-06 | End: 2022-08-06 | Stop reason: WASHOUT

## 2022-08-06 RX ORDER — SODIUM CHLORIDE 9 MG/ML
INJECTION, SOLUTION INTRAVENOUS CONTINUOUS
Status: DISCONTINUED | OUTPATIENT
Start: 2022-08-06 | End: 2022-08-06 | Stop reason: HOSPADM

## 2022-08-06 RX ORDER — DIPHENHYDRAMINE HYDROCHLORIDE 50 MG/ML
25 INJECTION INTRAMUSCULAR; INTRAVENOUS ONCE
Status: COMPLETED | OUTPATIENT
Start: 2022-08-06 | End: 2022-08-06

## 2022-08-06 RX ORDER — DIPHENHYDRAMINE HYDROCHLORIDE 50 MG/ML
INJECTION INTRAMUSCULAR; INTRAVENOUS
Status: COMPLETED
Start: 2022-08-06 | End: 2022-08-06

## 2022-08-06 RX ORDER — HYDROMORPHONE HYDROCHLORIDE 1 MG/ML
0.5 INJECTION, SOLUTION INTRAMUSCULAR; INTRAVENOUS; SUBCUTANEOUS
Status: DISCONTINUED | OUTPATIENT
Start: 2022-08-06 | End: 2022-08-06 | Stop reason: HOSPADM

## 2022-08-06 RX ORDER — ONDANSETRON 2 MG/ML
4 INJECTION INTRAMUSCULAR; INTRAVENOUS ONCE
Status: COMPLETED | OUTPATIENT
Start: 2022-08-06 | End: 2022-08-06

## 2022-08-06 RX ADMIN — DIPHENHYDRAMINE HYDROCHLORIDE 25 MG: 50 INJECTION, SOLUTION INTRAMUSCULAR; INTRAVENOUS at 20:19

## 2022-08-06 RX ADMIN — MIDAZOLAM HYDROCHLORIDE 4 MG: 1 INJECTION, SOLUTION INTRAMUSCULAR; INTRAVENOUS at 19:51

## 2022-08-06 RX ADMIN — ONDANSETRON 4 MG: 2 INJECTION INTRAMUSCULAR; INTRAVENOUS at 19:35

## 2022-08-06 RX ADMIN — FAMOTIDINE 20 MG: 10 INJECTION INTRAVENOUS at 20:19

## 2022-08-06 RX ADMIN — FENTANYL CITRATE 100 MCG: 50 INJECTION, SOLUTION INTRAMUSCULAR; INTRAVENOUS at 19:51

## 2022-08-06 RX ADMIN — DIPHENHYDRAMINE HYDROCHLORIDE 50 MG: 50 INJECTION INTRAMUSCULAR; INTRAVENOUS at 19:50

## 2022-08-06 RX ADMIN — SODIUM CHLORIDE: 9 INJECTION, SOLUTION INTRAVENOUS at 19:40

## 2022-08-06 RX ADMIN — DIPHENHYDRAMINE HYDROCHLORIDE 50 MG: 50 INJECTION, SOLUTION INTRAMUSCULAR; INTRAVENOUS at 19:50

## 2022-08-06 RX ADMIN — HYDROMORPHONE HYDROCHLORIDE 0.5 MG: 1 INJECTION, SOLUTION INTRAMUSCULAR; INTRAVENOUS; SUBCUTANEOUS at 19:30

## 2022-08-06 ASSESSMENT — ENCOUNTER SYMPTOMS
ABDOMINAL PAIN: 1
NAUSEA: 0
BLOOD IN STOOL: 0
DIARRHEA: 0

## 2022-08-06 NOTE — ED TRIAGE NOTES
"EMS report: has Pika disorder. Today swallowed metal nose piece out of mask. Denies intent to self harm, just \"couldn't help it\". Reports having over 100 endoscopies.      Triage Assessment     Row Name 08/06/22 9553       Triage Assessment (Adult)    Airway WDL WDL       Respiratory WDL    Respiratory WDL WDL       Skin Circulation/Temperature WDL    Skin Circulation/Temperature WDL WDL       Cardiac WDL    Cardiac WDL WDL       Peripheral/Neurovascular WDL    Peripheral Neurovascular WDL WDL       Cognitive/Neuro/Behavioral WDL    Cognitive/Neuro/Behavioral WDL WDL              "

## 2022-08-06 NOTE — ED PROVIDER NOTES
History   Chief Complaint:  Swallowed Foreign Body       HPI   Nevin Alvarado is a 31 year old female with history of PICA who presents via EMS after swallowing a foreign body.  She took the wire out of her COVID mask and swallowed it.  She did the same thing back in February and they were able to remove it through endoscopy.  She has had over 100 endoscopies and has had 5 surgery for perforations.  She is having a lot of pain in her left upper quadrant.  She swallowed this about 330 to 4:00 this afternoon or approximately 3 hours ago.  She did not choke on it, no esophageal or chest pain.  No other symptoms.  It was not to kill her self, she just cannot help her self from swallowing foreign objects.    Review of Systems   Cardiovascular: Negative for chest pain.   Gastrointestinal: Positive for abdominal pain. Negative for blood in stool, diarrhea and nausea.   All other systems reviewed and are negative.        Allergies:  Fentanyl  Other Drug Allergy (See Comments)    Medications:  Buspar  Pristiq  Latuda  Metformin  Lyrica  Imitrex  Valtrex  Ferrous sulfate  Hydrochorothiazide  Hydroxychloroquine  Hydroxyzine  Albuterol  Zyrtec    Past Medical History:     Borderline personality disorder  Depression  Migraines   Anxiety  ADHD  Pulmonary embolism  Dysphagia  Obesity  Foreign body ingestion, multiple  Esophageal tear  Pica  GERD     Past Surgical History:    Esophagogastroduodenoscopy, multiple  Laparotomy  Laparoscopy, endometriosis ablation  Carpal tunnel release, bilateral  Knee surgery  Mammoplasty reduction   Cholecystectomy     Social History:  The patient presents to the ED alone.  PCP: Latonya Knight     Physical Exam     Patient Vitals for the past 24 hrs:   BP Temp Temp src Pulse Resp SpO2 Height Weight   08/06/22 2117 -- -- -- -- -- 98 % -- --   08/06/22 2045 (!) 185/119 -- -- (!) 137 18 98 % -- --   08/06/22 2020 (!) 146/94 -- -- (!) 123 20 95 % -- --   08/06/22 2015 (!) 146/101 -- -- (!)  "125 (!) 46 -- -- --   08/06/22 2010 137/84 -- -- (!) 122 10 96 % -- --   08/06/22 2005 (!) 147/93 -- -- (!) 128 19 97 % -- --   08/06/22 2000 (!) 164/116 -- -- (!) 125 12 96 % -- --   08/06/22 1955 (!) 147/97 -- -- (!) 124 26 97 % -- --   08/06/22 1950 (!) 153/116 -- -- 105 16 96 % -- --   08/06/22 1945 (!) 135/114 -- -- 116 16 93 % -- --   08/06/22 1728 (!) 164/102 98.1  F (36.7  C) Oral 95 20 97 % 1.575 m (5' 2\") 135.2 kg (298 lb)       Physical Exam  Nursing note and vitals reviewed.    Constitutional:  Appears comfortable.    HENT:    Nose normal.  No discharge.  No blood in the mouth.     Oral mucosa is moist.  Cardiovascular:  Normal rate, regular rhythm with normal S1 and S2.      Normal heart sounds and peripheral pulses 2+ and equal.       No murmur or manuelito.  Pulmonary:  Effort normal and breath sounds clear to auscultation bilaterally.     No respiratory distress.  No stridor.     No wheezes. No rales.     GI:    Soft.  Obese.  She does have some left upper quadrant tenderness with palpation, lower abdomen is nontender.  Musculoskeletal:  Normal range of motion. No extremity deformity.     No edema and no tenderness.    Neurological:   Alert and oriented. No focal weakness.  Skin:    Skin is warm and dry. No rash noted.   Psychiatric:   Behavior is normal. Appropriate mood and affect.     Judgment and thought content normal.         Emergency Department Course     Imaging:  Abdomen XR 1 vw   Final Result   IMPRESSION: There is no change in the 14 cm linear metallic wire density projecting over the stomach which certainly could represent a stable gastric foreign body. This is been present back to at least 2/23/2022.   Surgical clips from cholecystectomy. No other foreign body evident. Bowel gas pattern normal with no obstruction.         Report per radiology    Laboratory:  Labs Ordered and Resulted from Time of ED Arrival to Time of ED Departure   COVID-19 VIRUS (CORONAVIRUS) BY PCR - Normal       Result " Value    SARS CoV2 PCR Negative            Emergency Department Course:    Mental Health Risk Assessment      PSS-3    Date and Time Over the past 2 weeks have you felt down, depressed, or hopeless? Over the past 2 weeks have you had thoughts of killing yourself? Have you ever attempted to kill yourself? When did this last happen? User   08/06/22 9548 no no yes more than 6 months ago HORACE                  Reviewed:  I reviewed nursing notes, vitals and past medical history    Assessments:  1825 I obtained history and examined the patient as noted above.   1835 I rechecked the patient and explained findings thus far.     Consults:  1838 I spoke with Dr. Griffin of gastroenterology     Interventions:  Medications   HYDROmorphone (PF) (DILAUDID) injection 0.5 mg (0.5 mg Intravenous Given 8/6/22 1930)   sodium chloride 0.9% infusion ( Intravenous Stopped 8/6/22 2112)   midazolam (VERSED) injection 4 mg (4 mg Intravenous Given 8/6/22 1951)   fentaNYL (PF) (SUBLIMAZE) injection 100 mcg (100 mcg Intravenous Given 8/6/22 1951)   ondansetron (ZOFRAN) injection 4 mg (4 mg Intravenous Given 8/6/22 1935)   diphenhydrAMINE (BENADRYL) injection 50 mg (50 mg Intravenous Given 8/6/22 1950)   famotidine (PEPCID) injection 20 mg (20 mg Intravenous Given 8/6/22 2019)   diphenhydrAMINE (BENADRYL) injection 25 mg (25 mg Intravenous Given 8/6/22 2019)      Disposition:  The patient was discharged to home.     Impression & Plan       Medical Decision Making:  Patient has a history of pica and comes in after eating the metal piece on an a COVID mask.  She was not suicidal and this is what she does.  There is a long thin metal piece noted on her x-ray in her stomach.  She was having pain and was given a dose of Dilaudid.  I contacted Dr. Griffin and GI will be coming in to remove this.  She has only been in there for about 3 hours.  This was successfully removed and because the patient got fentanyl she did develop some itching and she was  given 3 doses of Benadryl 75 mg total IV, 20 mg of IV Pepcid and the rash resolved her itching resolved.  I encouraged the patient to get back in with her psychiatrist to talk about her pica.  Incidentally before she was discharged, she rolled out of bed and landed on the floor.  She said her right back was sore around the area just below the scapula but there is no visible bruising and there was no definite tenderness when I palpated.  Reassurance was given and she was moving around in the bed fine.  She did not hit her head.  She will be sent back to the group home.    Set up an appointment for recheck with your doctor.  Benadryl overnight as needed for itching.    Diagnosis:    ICD-10-CM    1. Swallowed foreign body, initial encounter  T18.9XXA        Discharge Medications:  New Prescriptions    No medications on file       Scribe Disclosure:  I, Agatha Olivares, am serving as a scribe at 6:24 PM on 8/6/2022 to document services personally performed by Kathy Luque MD based on my observations and the provider's statements to me.         Kathy Luque MD  08/06/22 8813

## 2022-08-06 NOTE — ED NOTES
Bed: ED06  Expected date: 8/6/22  Expected time: 5:04 PM  Means of arrival: Ambulance  Comments:  417 31f swallowed wire ETA 5622

## 2022-08-07 ENCOUNTER — HOSPITAL ENCOUNTER (EMERGENCY)
Facility: CLINIC | Age: 32
Discharge: HOME | End: 2022-08-07
Attending: EMERGENCY MEDICINE | Admitting: EMERGENCY MEDICINE
Payer: COMMERCIAL

## 2022-08-07 VITALS
RESPIRATION RATE: 29 BRPM | DIASTOLIC BLOOD PRESSURE: 87 MMHG | BODY MASS INDEX: 53.92 KG/M2 | SYSTOLIC BLOOD PRESSURE: 141 MMHG | OXYGEN SATURATION: 92 % | WEIGHT: 293 LBS | HEIGHT: 62 IN | HEART RATE: 109 BPM

## 2022-08-07 DIAGNOSIS — I10 HYPERTENSION, UNSPECIFIED TYPE: ICD-10-CM

## 2022-08-07 DIAGNOSIS — T18.9XXA INGESTION OF FOREIGN BODY, INITIAL ENCOUNTER: ICD-10-CM

## 2022-08-07 LAB — UPPER GI ENDOSCOPY: NORMAL

## 2022-08-07 PROCEDURE — 258N000003 HC RX IP 258 OP 636: Performed by: EMERGENCY MEDICINE

## 2022-08-07 PROCEDURE — 96365 THER/PROPH/DIAG IV INF INIT: CPT

## 2022-08-07 PROCEDURE — 250N000009 HC RX 250: Performed by: EMERGENCY MEDICINE

## 2022-08-07 PROCEDURE — 96375 TX/PRO/DX INJ NEW DRUG ADDON: CPT

## 2022-08-07 PROCEDURE — 99285 EMERGENCY DEPT VISIT HI MDM: CPT | Mod: 25

## 2022-08-07 PROCEDURE — 250N000011 HC RX IP 250 OP 636: Performed by: INTERNAL MEDICINE

## 2022-08-07 PROCEDURE — 43247 EGD REMOVE FOREIGN BODY: CPT

## 2022-08-07 PROCEDURE — 96374 THER/PROPH/DIAG INJ IV PUSH: CPT | Mod: 59

## 2022-08-07 PROCEDURE — 250N000011 HC RX IP 250 OP 636: Performed by: EMERGENCY MEDICINE

## 2022-08-07 PROCEDURE — 96361 HYDRATE IV INFUSION ADD-ON: CPT

## 2022-08-07 RX ORDER — HYDROMORPHONE HYDROCHLORIDE 1 MG/ML
0.5 INJECTION, SOLUTION INTRAMUSCULAR; INTRAVENOUS; SUBCUTANEOUS ONCE
Status: COMPLETED | OUTPATIENT
Start: 2022-08-07 | End: 2022-08-07

## 2022-08-07 RX ORDER — DIPHENHYDRAMINE HCL 25 MG
25 CAPSULE ORAL ONCE
Status: COMPLETED | OUTPATIENT
Start: 2022-08-07 | End: 2022-08-07

## 2022-08-07 RX ORDER — FENTANYL CITRATE 50 UG/ML
100 INJECTION, SOLUTION INTRAMUSCULAR; INTRAVENOUS ONCE
Status: COMPLETED | OUTPATIENT
Start: 2022-08-07 | End: 2022-08-07

## 2022-08-07 RX ORDER — DIPHENHYDRAMINE HYDROCHLORIDE 50 MG/ML
50 INJECTION INTRAMUSCULAR; INTRAVENOUS ONCE
Status: COMPLETED | OUTPATIENT
Start: 2022-08-07 | End: 2022-08-07

## 2022-08-07 RX ORDER — DIPHENHYDRAMINE HYDROCHLORIDE 50 MG/ML
25 INJECTION INTRAMUSCULAR; INTRAVENOUS ONCE
Status: COMPLETED | OUTPATIENT
Start: 2022-08-07 | End: 2022-08-07

## 2022-08-07 RX ADMIN — DIPHENHYDRAMINE HYDROCHLORIDE 50 MG: 50 INJECTION, SOLUTION INTRAMUSCULAR; INTRAVENOUS at 22:10

## 2022-08-07 RX ADMIN — SODIUM CHLORIDE 1000 ML: 9 INJECTION, SOLUTION INTRAVENOUS at 20:24

## 2022-08-07 RX ADMIN — FENTANYL CITRATE 100 MCG: 50 INJECTION, SOLUTION INTRAMUSCULAR; INTRAVENOUS at 22:11

## 2022-08-07 RX ADMIN — MIDAZOLAM 2 MG: 1 INJECTION INTRAMUSCULAR; INTRAVENOUS at 22:11

## 2022-08-07 RX ADMIN — HYDROMORPHONE HYDROCHLORIDE 0.5 MG: 1 INJECTION, SOLUTION INTRAMUSCULAR; INTRAVENOUS; SUBCUTANEOUS at 21:10

## 2022-08-07 RX ADMIN — DIPHENHYDRAMINE HYDROCHLORIDE 25 MG: 50 INJECTION, SOLUTION INTRAMUSCULAR; INTRAVENOUS at 22:40

## 2022-08-07 RX ADMIN — TOPICAL ANESTHETIC 0.5 ML: 200 SPRAY DENTAL; PERIODONTAL at 22:10

## 2022-08-07 RX ADMIN — MIDAZOLAM 2 MG: 1 INJECTION INTRAMUSCULAR; INTRAVENOUS at 22:13

## 2022-08-07 ASSESSMENT — ENCOUNTER SYMPTOMS
DIFFICULTY URINATING: 0
NAUSEA: 1
VOMITING: 0
FEVER: 0
ABDOMINAL PAIN: 1
COUGH: 0

## 2022-08-07 NOTE — ED NOTES
Pt ambulated to the bathroom and back without needing assistance.  Pt was able to climb back into bed and when connected back up to VS monitoring, Pt's oxygenation was at 94% on RA.

## 2022-08-07 NOTE — CONSULTS
[facility]    Gastroenterology Consultation    Date of Admission:  8/6/2022    History of Present Illness   Nevin Alvarado is a 31 year old female who presents with foreign body ingestion. With a history of repeated ingestions and insertions of foreign objects, either swallowed, or inserted into the rectum or vagina. She has required numerous endoscopies for retrieval of foreign bodies and has sustained esophageal perforation in the past. Last endoscopy was 7/29/2022 at [facility] [place] when a mask wire was removed. Pt ingested another mask wire this evening, and presented to ED.      Pt endorses some abdominal pain. Not on any anticoagulants. Last ate 2 pm. She did not choke on it, no esophageal or chest pain.  No other symptoms.  It was not suicidal attempt, she just cannot help her self from swallowing foreign objects.     Pt has been trying different medication and was able to see her therapist which they have been trying different method such as art therapy.    Assessment & Plan   Nevin Alvarado is a 31 year old female who was admitted on 8/6/2022. I was asked to see the patient for foreign body ingestion.  -multiple EGD and prior perforation, mask wire per pt this time and confirmed by AXR  -denies any chest pain  -will plan for EGD today  -recommend arrangement for psych consult given the main reason is problem with stress coping/metnal health issue      [address] level 2 consult        Active Problems:    * No active hospital problems. *      Bety Nova MD  (M[place])  [facility]  Office: [phone]  Cell: [phone], please feel free to call the cell    Code Status    No Order      Primary Care Physician   Latonya Nova MD  (Morgan)  [facility]  Office: [phone]  Cell: [phone], please feel free to call the cell      Past Medical History   I have reviewed this patient's medical  history and updated it with pertinent information if needed.   No past medical history on file.    Past Surgical History   I have reviewed this patient's surgical history and updated it with pertinent information if needed.  No past surgical history on file.    Prior to Admission Medications   None     Allergies   Allergies   Allergen Reactions     Other Drug Allergy (See Comments)      See original file MRN [id number]. Files are marked for merge       Social History   I have reviewed this patient's social history and updated it with pertinent information if needed. Nevin Alvarado      Family History   I have reviewed this patient's family history and updated it with pertinent information if needed.   No family history on file.    Review of Systems   The 10 point Review of Systems is negative other than noted in the HPI or here.     Physical Exam   Temp: 98.1  F (36.7  C) Temp src: Oral BP: (!) 164/102 Pulse: 95   Resp: 20 SpO2: 97 %      Vital Signs with Ranges  Temp:  [98.1  F (36.7  C)] 98.1  F (36.7  C)  Pulse:  [95] 95  Resp:  [20] 20  BP: (164)/(102) 164/102  SpO2:  [97 %] 97 %  298 lbs 0 oz    Exam:  Constitutional: healthy, alert and no distress  Head: Normocephalic. No masses, lesions, tenderness or abnormalities  Neck: Neck supple. No adenopathy. Thyroid symmetric, normal size,, Carotids without bruits.  ENT: ENT exam normal, no neck nodes or sinus tenderness  Cardiovascular: negative, PMI normal. No lifts, heaves, or thrills. RRR. No murmurs, clicks gallops or rub  Respiratory: negative, Percussion normal. Good diaphragmatic excursion. Lungs clear  Gastrointestinal: Abdomen soft, non-tender. BS normal. No masses, organomegaly  : Deferred  Musculoskeletal: extremities normal- no gross deformities noted, gait normal and normal muscle tone  Skin: no suspicious lesions or rashes  Neurologic: Gait normal. Reflexes normal and symmetric. Sensation grossly WNL.  Psychiatric: mentation appears normal and  affect normal/bright  Hematologic/Lymphatic/Immunologic: Normal cervical lymph nodes     Data   Results for orders placed or performed during the hospital encounter of 08/06/22 (from the past 24 hour(s))   Abdomen XR 1 vw    Narrative    EXAM: XR ABDOMEN 1 VIEW  LOCATION: [facility]  DATE/TIME: 8/6/2022 6:10 PM    INDICATION: allegedly swallowed metal FB.  Has PICA  COMPARISON: 7/20/2022      Impression    IMPRESSION: There is no change in the 14 cm linear metallic wire density projecting over the stomach which certainly could represent a stable gastric foreign body. This is been present back to at least 2/23/2022.  Surgical clips from cholecystectomy. No other foreign body evident. Bowel gas pattern normal with no obstruction.

## 2022-08-07 NOTE — ED NOTES
"Another RN went to respond to call light and reported to the writer that Pt states that she \"fell out of bed\" and \"hit her back\" and wants the doctor to examine her back.  RN reported that there was a small abrasion/red area and went to inform the doctor.    Pt uses the call light again and stated she wanted to speak with her nurse.  Writer informed Pt that they are her nurse.  Pt states that she \"slid off the bed\" and \"hit her back\" and wants the doctor to check it out.  Pt informed that doctor is already aware and would be in shortly.    Doctor examined Pt and cleared her for discharge  "

## 2022-08-07 NOTE — SEDATION DOCUMENTATION
Total of 4 mg versed, 100 mcg Fentanyl, 50mg Benadryl.  O2 at 5L after dropping her sats.  Wire was successfully removed at 1959.

## 2022-08-08 NOTE — DISCHARGE INSTRUCTIONS
Your blood pressures are elevated today.  They have been elevated on your emergency department visits.  Please get this checked at your primary care doctor's office.

## 2022-08-08 NOTE — ED NOTES
Bed: ED24  Expected date: 8/7/22  Expected time: 10:08 PM  Means of arrival:   Comments:  For ED 24

## 2022-08-08 NOTE — ED NOTES
Received pt back into care post scope. C/o itchiness all over. No obvious hives noted to skin. Given IV dose of benadryl. MD aware. Sitting up in bed , call bell in reach. Resps unlaboured. No chest tightness or airway issues.

## 2022-08-08 NOTE — ED NOTES
Pt trying to sleep. C/o 7/10 pain given analgesia. MD aware of GI orders in computer for pre sedation. Pt relaxing in stretcher sr up x 2.

## 2022-08-08 NOTE — ED NOTES
Pt reassessed for discharge. Woke from sleep, drowsy from benadryl. Okay to call lift. Hypertensive MD aware of > 200 systolic. Pt reports headache with same. MD states re check BP in 15 min. Pt does not take HTN meds or propanolol which is on her file.

## 2022-08-08 NOTE — ED NOTES
Bed: ED24  Expected date: 8/7/22  Expected time: 7:55 PM  Means of arrival:   Comments:  [facility] 31F swallowed FO LAURA 2010

## 2022-08-08 NOTE — CONSULTS
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 Reaction: Rash; HUT Reaction Type: Allergy; HUT Severity: Low; HUT Noted: [id number]       Social History   I have reviewed this patient's social history and updated it with pertinent information if needed. Nevin Alvarado  reports that she has never smoked. She has never used smokeless tobacco. She reports that she does  not drink alcohol and does not use drugs.    Family History   I have reviewed this patient's family history and updated it with pertinent information if needed.   Family History   Problem Relation Age of Onset     Diabetes Type 2  Maternal Grandmother      Diabetes Type 2  Paternal Grandmother      Breast Cancer Paternal Grandmother      Cerebrovascular Disease Father 53     Myocardial Infarction No family hx of      Coronary Artery Disease Early Onset No family hx of      Ovarian Cancer No family hx of      Colon Cancer No family hx of      Depression Mother      Anxiety Disorder Mother        Review of Systems   The 10 point Review of Systems is negative other than noted in the HPI or here.     Physical Exam     Temp src: Oral BP: (!) 141/103 Pulse: 105   Resp: 18 SpO2: 98 %      Vital Signs with Ranges  Pulse:  [105] 105  Resp:  [18] 18  BP: (141)/(103) 141/103  SpO2:  [98 %] 98 %  298 lbs 0 oz    Exam:  Constitutional: healthy, alert and no distress  Head: Normocephalic. No masses, lesions, tenderness or abnormalities  Neck: Neck supple. No adenopathy. Thyroid symmetric, normal size,, Carotids without bruits.  ENT: ENT exam normal, no neck nodes or sinus tenderness  Cardiovascular: negative, PMI normal. No lifts, heaves, or thrills. RRR. No murmurs, clicks gallops or rub  Respiratory: negative, Percussion normal. Good diaphragmatic excursion. Lungs clear  Gastrointestinal: Abdomen soft, non-tender. BS normal. No masses, organomegaly  : Deferred  Musculoskeletal: extremities normal- no gross deformities noted, gait normal and normal muscle tone  Skin: no suspicious lesions or rashes  Neurologic: Gait normal. Reflexes normal and symmetric. Sensation grossly WNL.  Psychiatric: mentation appears normal and affect normal/bright  Hematologic/Lymphatic/Immunologic: Normal cervical lymph nodes     Data   No results found. However, due to the size of the patient record, not all encounters were searched. Please check  Results Review for a complete set of results.
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
## 2022-08-08 NOTE — ED TRIAGE NOTES
Pt leti. Swallowed a micike pin at 1530. Was seen at urgency room. Sent here for scope with GI MD. C/o epigastric pain 8/10 constant. Denies rectal bleeding.Denies nausea

## 2022-08-13 ENCOUNTER — HOSPITAL ENCOUNTER (EMERGENCY)
Facility: CLINIC | Age: 32
Discharge: HOME | End: 2022-08-13
Attending: EMERGENCY MEDICINE | Admitting: EMERGENCY MEDICINE
Payer: COMMERCIAL

## 2022-08-13 ENCOUNTER — APPOINTMENT (OUTPATIENT)
Dept: GENERAL RADIOLOGY | Facility: CLINIC | Age: 32
End: 2022-08-13
Attending: EMERGENCY MEDICINE
Payer: COMMERCIAL

## 2022-08-13 VITALS
OXYGEN SATURATION: 92 % | HEART RATE: 112 BPM | SYSTOLIC BLOOD PRESSURE: 141 MMHG | RESPIRATION RATE: 15 BRPM | DIASTOLIC BLOOD PRESSURE: 104 MMHG | TEMPERATURE: 98.5 F

## 2022-08-13 DIAGNOSIS — Z86.59 HISTORY OF PSYCHIATRIC DISORDER: ICD-10-CM

## 2022-08-13 DIAGNOSIS — T18.9XXA SWALLOWED FOREIGN BODY, INITIAL ENCOUNTER: ICD-10-CM

## 2022-08-13 DIAGNOSIS — T18.2XXA FOREIGN BODY IN STOMACH, INITIAL ENCOUNTER: ICD-10-CM

## 2022-08-13 DIAGNOSIS — T18.108A FOREIGN BODY IN ESOPHAGUS, INITIAL ENCOUNTER: ICD-10-CM

## 2022-08-13 LAB — UPPER GI ENDOSCOPY: NORMAL

## 2022-08-13 PROCEDURE — 250N000011 HC RX IP 250 OP 636: Performed by: EMERGENCY MEDICINE

## 2022-08-13 PROCEDURE — 74018 RADEX ABDOMEN 1 VIEW: CPT

## 2022-08-13 PROCEDURE — 96375 TX/PRO/DX INJ NEW DRUG ADDON: CPT

## 2022-08-13 PROCEDURE — 250N000009 HC RX 250: Performed by: INTERNAL MEDICINE

## 2022-08-13 PROCEDURE — 250N000011 HC RX IP 250 OP 636: Performed by: INTERNAL MEDICINE

## 2022-08-13 PROCEDURE — 43247 EGD REMOVE FOREIGN BODY: CPT | Performed by: INTERNAL MEDICINE

## 2022-08-13 PROCEDURE — 96374 THER/PROPH/DIAG INJ IV PUSH: CPT | Mod: 59

## 2022-08-13 PROCEDURE — G0500 MOD SEDAT ENDO SERVICE >5YRS: HCPCS | Performed by: INTERNAL MEDICINE

## 2022-08-13 PROCEDURE — 99291 CRITICAL CARE FIRST HOUR: CPT | Mod: 25

## 2022-08-13 RX ORDER — NALOXONE HYDROCHLORIDE 0.4 MG/ML
0.4 INJECTION, SOLUTION INTRAMUSCULAR; INTRAVENOUS; SUBCUTANEOUS
Status: DISCONTINUED | OUTPATIENT
Start: 2022-08-13 | End: 2022-08-14 | Stop reason: HOSPADM

## 2022-08-13 RX ORDER — NALOXONE HYDROCHLORIDE 0.4 MG/ML
0.2 INJECTION, SOLUTION INTRAMUSCULAR; INTRAVENOUS; SUBCUTANEOUS
Status: DISCONTINUED | OUTPATIENT
Start: 2022-08-13 | End: 2022-08-14 | Stop reason: HOSPADM

## 2022-08-13 RX ORDER — DIPHENHYDRAMINE HYDROCHLORIDE 50 MG/ML
25-50 INJECTION INTRAMUSCULAR; INTRAVENOUS
Status: COMPLETED | OUTPATIENT
Start: 2022-08-13 | End: 2022-08-13

## 2022-08-13 RX ORDER — FLUMAZENIL 0.1 MG/ML
0.2 INJECTION, SOLUTION INTRAVENOUS
Status: DISCONTINUED | OUTPATIENT
Start: 2022-08-13 | End: 2022-08-14 | Stop reason: HOSPADM

## 2022-08-13 RX ORDER — ATROPINE SULFATE 0.1 MG/ML
1 INJECTION INTRAVENOUS
Status: DISCONTINUED | OUTPATIENT
Start: 2022-08-13 | End: 2022-08-14 | Stop reason: HOSPADM

## 2022-08-13 RX ORDER — EPINEPHRINE 1 MG/ML
0.1 INJECTION, SOLUTION, CONCENTRATE INTRAVENOUS
Status: DISCONTINUED | OUTPATIENT
Start: 2022-08-13 | End: 2022-08-14 | Stop reason: HOSPADM

## 2022-08-13 RX ORDER — FENTANYL CITRATE 50 UG/ML
50-100 INJECTION, SOLUTION INTRAMUSCULAR; INTRAVENOUS EVERY 5 MIN PRN
Status: DISCONTINUED | OUTPATIENT
Start: 2022-08-13 | End: 2022-08-14 | Stop reason: HOSPADM

## 2022-08-13 RX ORDER — SIMETHICONE 40MG/0.6ML
133 SUSPENSION, DROPS(FINAL DOSAGE FORM)(ML) ORAL
Status: DISCONTINUED | OUTPATIENT
Start: 2022-08-13 | End: 2022-08-14 | Stop reason: HOSPADM

## 2022-08-13 RX ORDER — ONDANSETRON 2 MG/ML
4 INJECTION INTRAMUSCULAR; INTRAVENOUS
Status: DISCONTINUED | OUTPATIENT
Start: 2022-08-13 | End: 2022-08-14 | Stop reason: HOSPADM

## 2022-08-13 RX ORDER — DIPHENHYDRAMINE HYDROCHLORIDE 50 MG/ML
25-50 INJECTION INTRAMUSCULAR; INTRAVENOUS
Status: DISCONTINUED | OUTPATIENT
Start: 2022-08-13 | End: 2022-08-14 | Stop reason: HOSPADM

## 2022-08-13 RX ORDER — LIDOCAINE 40 MG/G
CREAM TOPICAL
Status: DISCONTINUED | OUTPATIENT
Start: 2022-08-13 | End: 2022-08-14 | Stop reason: HOSPADM

## 2022-08-13 RX ADMIN — MIDAZOLAM HYDROCHLORIDE 1 MG: 1 INJECTION, SOLUTION INTRAMUSCULAR; INTRAVENOUS at 21:36

## 2022-08-13 RX ADMIN — DIPHENHYDRAMINE HYDROCHLORIDE 50 MG: 50 INJECTION, SOLUTION INTRAMUSCULAR; INTRAVENOUS at 21:23

## 2022-08-13 RX ADMIN — FENTANYL CITRATE 100 MCG: 50 INJECTION, SOLUTION INTRAMUSCULAR; INTRAVENOUS at 21:24

## 2022-08-13 RX ADMIN — FENTANYL CITRATE 50 MCG: 50 INJECTION, SOLUTION INTRAMUSCULAR; INTRAVENOUS at 21:29

## 2022-08-13 RX ADMIN — MIDAZOLAM HYDROCHLORIDE 2 MG: 1 INJECTION, SOLUTION INTRAMUSCULAR; INTRAVENOUS at 21:24

## 2022-08-13 RX ADMIN — ONDANSETRON 4 MG: 2 INJECTION INTRAMUSCULAR; INTRAVENOUS at 20:50

## 2022-08-13 RX ADMIN — TOPICAL ANESTHETIC 1 SPRAY: 200 SPRAY DENTAL; PERIODONTAL at 21:24

## 2022-08-13 ASSESSMENT — ACTIVITIES OF DAILY LIVING (ADL)
ADLS_ACUITY_SCORE: 40

## 2022-08-13 NOTE — ED NOTES
Bed: ED36  Expected date: 8/13/22  Expected time: 6:52 PM  Means of arrival:   Comments:  [facility]  31F  Abd pain
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
## 2022-08-14 NOTE — H&P
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 HUT Noted: [id number]     Vancomycin Itching, Swelling and Rash     Other reaction(s): Redness  Flushed face, very itchy; HUT Comment: Flushed face, very itchy; HUT Reaction: Angioedema; HUT Reaction: Redness; HUT Severity: Med; HUT Noted: [id number]    facial     Hydrocodone Nausea and Vomiting and GI Disturbance     vomiting for days, PN: vomiting for days; HUT Comment: vomiting for days; HUT  "Reaction: Gastrointestinal; HUT Reaction: Nausea And Vomiting; HUT Reaction Type: Intolerance; HUT Severity: Med; HUT Noted: [id number]  vomiting for days       Blood-Group Specific Substance Other (See Comments)     Patient has an anti-Cw and non-specific antibodies. Blood product orders may be delayed. Draw one red top and two purple top tubes for all type/screen/crossmatch orders.  Patient has anti-Cw, anti-K (Angella), Warm auto and nonspecific antibodies. Blood products may be delayed. Draw patient 24 hours prior to transfusion. Draw one red top and two purple top tubes for all type and screen orders.     Clavulanic Acid Angioedema     Fentanyl Itching     Naltrexone Other (See Comments)     Reaction(s): Vivid dreams.  Reaction(s): Vivid dreams.    Reaction(s): Vivid dreams.  Reaction(s): Vivid dreams.  Reaction(s): Vivid dreams.  Reaction(s): Vivid dreams.  Reaction(s): Vivid dreams.  Reaction(s): Vivid dreams.  Reaction(s): Vivid dreams.  Reaction(s): Vivid dreams.    Reaction(s): Vivid dreams.  Reaction(s): Vivid dreams.  Reaction(s): Vivid dreams.     Other Drug Allergy (See Comments)      See original file MRN [id number]. Files are marked for merge     Oxycodone Swelling     Adhesive Tape Rash     Silicone type  Silicone type     Band-Aid Anti-Itch      Other reaction(s): adhesive allergy     Cephalosporins Rash     Lamotrigine Rash     Possibly associated with Lamictal.   HUT Comment: Possibly associated with Lamictal. ; HUT Reaction: Rash; HUT Reaction Type: Allergy; HUT Severity: Low; HUT Noted: [id number]        REVIEW OF SYSTEMS:   5 point ROS negative except as noted above in HPI, including Gen., Resp., CV, GI &  system review.    PHYSICAL EXAM:   BP (!) 143/92 (BP Location: Left arm)   Pulse 92   Temp 98.5  F (36.9  C) (Oral)   Resp 16   LMP  (LMP Unknown)   SpO2 97%  Estimated body mass index is 54.5 kg/m  as calculated from the following:    Height as of 8/7/22: 1.575 m (5' 2\").    Weight as of " 8/7/22: 135.2 kg (298 lb).   GENERAL APPEARANCE: alert, and oriented  MENTAL STATUS: alert  AIRWAY EXAM: Mallampatti Class I (visualization of the soft palate, fauces, uvula, anterior and posterior pillars)  RESP: lungs clear to auscultation - no rales, rhonchi or wheezes  CV: regular rates and rhythm  DIAGNOSTICS:    Not indicated    IMPRESSION   ASA Class 2 - Mild systemic disease    PLAN:   Plan for Esophagogastroduodenoscopy with possible biopsy, possible dilation, possible foreign body removal. We discussed the risks, benefits and alternatives and the patient wished to proceed.    The above has been forwarded to the consulting provider.      Signed Electronically by: Brice Ramirez MD  August 13, 2022

## 2022-08-14 NOTE — ED NOTES
Pt discharged in care of sisterJihan. Verbalized understanding of all written and verbal instructions. Pt is ambulatory off unit w/ steady gait en route to home.

## 2022-08-14 NOTE — ED TRIAGE NOTES
Pt arrives via EMS from [facility] for abdominal pain following ingesting 2 metal strips from covid masks around 1600 today. Pt has had several instances of ingesting foreign bodies in the past, resulting in surgeries. C/O upper left abdominal pain and nausea. VSS, A&Ox4

## 2022-08-22 ENCOUNTER — APPOINTMENT (OUTPATIENT)
Dept: GENERAL RADIOLOGY | Facility: CLINIC | Age: 32
End: 2022-08-22
Attending: EMERGENCY MEDICINE
Payer: COMMERCIAL

## 2022-08-22 ENCOUNTER — APPOINTMENT (OUTPATIENT)
Dept: CT IMAGING | Facility: CLINIC | Age: 32
End: 2022-08-22
Attending: EMERGENCY MEDICINE
Payer: COMMERCIAL

## 2022-08-22 ENCOUNTER — HOSPITAL ENCOUNTER (EMERGENCY)
Facility: CLINIC | Age: 32
Discharge: HOME | End: 2022-08-22
Attending: EMERGENCY MEDICINE | Admitting: EMERGENCY MEDICINE
Payer: COMMERCIAL

## 2022-08-22 VITALS
DIASTOLIC BLOOD PRESSURE: 96 MMHG | SYSTOLIC BLOOD PRESSURE: 145 MMHG | WEIGHT: 293 LBS | OXYGEN SATURATION: 96 % | BODY MASS INDEX: 54.5 KG/M2 | TEMPERATURE: 98.3 F | HEART RATE: 99 BPM | RESPIRATION RATE: 16 BRPM

## 2022-08-22 DIAGNOSIS — R07.0 THROAT PAIN: ICD-10-CM

## 2022-08-22 DIAGNOSIS — J38.01 PARESIS OF LEFT VOCAL CORD: ICD-10-CM

## 2022-08-22 LAB
ANION GAP SERPL CALCULATED.3IONS-SCNC: 12 MMOL/L (ref 7–15)
BASOPHILS # BLD AUTO: 0.1 10E3/UL (ref 0–0.2)
BASOPHILS NFR BLD AUTO: 0 %
BUN SERPL-MCNC: 14.9 MG/DL (ref 6–20)
CALCIUM SERPL-MCNC: 10.5 MG/DL (ref 8.6–10)
CHLORIDE SERPL-SCNC: 97 MMOL/L (ref 98–107)
CREAT SERPL-MCNC: 0.67 MG/DL (ref 0.51–0.95)
CRP SERPL-MCNC: 33 MG/L
D DIMER PPP FEU-MCNC: 0.42 UG/ML FEU (ref 0–0.5)
DEPRECATED HCO3 PLAS-SCNC: 28 MMOL/L (ref 22–29)
EOSINOPHIL # BLD AUTO: 0.2 10E3/UL (ref 0–0.7)
EOSINOPHIL NFR BLD AUTO: 2 %
ERYTHROCYTE [DISTWIDTH] IN BLOOD BY AUTOMATED COUNT: 13.2 % (ref 10–15)
GFR SERPL CREATININE-BSD FRML MDRD: >90 ML/MIN/1.73M2
GLUCOSE SERPL-MCNC: 115 MG/DL (ref 70–99)
HCG SERPL QL: NEGATIVE
HCT VFR BLD AUTO: 41.3 % (ref 35–47)
HGB BLD-MCNC: 13.6 G/DL (ref 11.7–15.7)
HOLD SPECIMEN: NORMAL
IMM GRANULOCYTES # BLD: 0.1 10E3/UL
IMM GRANULOCYTES NFR BLD: 1 %
LYMPHOCYTES # BLD AUTO: 2.4 10E3/UL (ref 0.8–5.3)
LYMPHOCYTES NFR BLD AUTO: 20 %
MCH RBC QN AUTO: 29.5 PG (ref 26.5–33)
MCHC RBC AUTO-ENTMCNC: 32.9 G/DL (ref 31.5–36.5)
MCV RBC AUTO: 90 FL (ref 78–100)
MONOCYTES # BLD AUTO: 0.7 10E3/UL (ref 0–1.3)
MONOCYTES NFR BLD AUTO: 6 %
NEUTROPHILS # BLD AUTO: 8.8 10E3/UL (ref 1.6–8.3)
NEUTROPHILS NFR BLD AUTO: 71 %
NRBC # BLD AUTO: 0 10E3/UL
NRBC BLD AUTO-RTO: 0 /100
PLATELET # BLD AUTO: 296 10E3/UL (ref 150–450)
POTASSIUM SERPL-SCNC: 4 MMOL/L (ref 3.4–5.3)
RBC # BLD AUTO: 4.61 10E6/UL (ref 3.8–5.2)
SARS-COV-2 RNA RESP QL NAA+PROBE: NEGATIVE
SODIUM SERPL-SCNC: 137 MMOL/L (ref 136–145)
WBC # BLD AUTO: 12.2 10E3/UL (ref 4–11)

## 2022-08-22 PROCEDURE — 71260 CT THORAX DX C+: CPT | Mod: 26 | Performed by: RADIOLOGY

## 2022-08-22 PROCEDURE — U0003 INFECTIOUS AGENT DETECTION BY NUCLEIC ACID (DNA OR RNA); SEVERE ACUTE RESPIRATORY SYNDROME CORONAVIRUS 2 (SARS-COV-2) (CORONAVIRUS DISEASE [COVID-19]), AMPLIFIED PROBE TECHNIQUE, MAKING USE OF HIGH THROUGHPUT TECHNOLOGIES AS DESCRIBED BY CMS-2020-01-R: HCPCS | Performed by: EMERGENCY MEDICINE

## 2022-08-22 PROCEDURE — 99285 EMERGENCY DEPT VISIT HI MDM: CPT | Performed by: EMERGENCY MEDICINE

## 2022-08-22 PROCEDURE — 99285 EMERGENCY DEPT VISIT HI MDM: CPT | Mod: 25 | Performed by: EMERGENCY MEDICINE

## 2022-08-22 PROCEDURE — 43235 EGD DIAGNOSTIC BRUSH WASH: CPT

## 2022-08-22 PROCEDURE — C9803 HOPD COVID-19 SPEC COLLECT: HCPCS | Performed by: EMERGENCY MEDICINE

## 2022-08-22 PROCEDURE — 96376 TX/PRO/DX INJ SAME DRUG ADON: CPT | Performed by: EMERGENCY MEDICINE

## 2022-08-22 PROCEDURE — 71045 X-RAY EXAM CHEST 1 VIEW: CPT

## 2022-08-22 PROCEDURE — 71045 X-RAY EXAM CHEST 1 VIEW: CPT | Mod: 26 | Performed by: RADIOLOGY

## 2022-08-22 PROCEDURE — 86140 C-REACTIVE PROTEIN: CPT | Performed by: EMERGENCY MEDICINE

## 2022-08-22 PROCEDURE — 85379 FIBRIN DEGRADATION QUANT: CPT | Performed by: EMERGENCY MEDICINE

## 2022-08-22 PROCEDURE — 74018 RADEX ABDOMEN 1 VIEW: CPT | Mod: 26 | Performed by: RADIOLOGY

## 2022-08-22 PROCEDURE — 250N000011 HC RX IP 250 OP 636: Performed by: EMERGENCY MEDICINE

## 2022-08-22 PROCEDURE — 74018 RADEX ABDOMEN 1 VIEW: CPT

## 2022-08-22 PROCEDURE — 96375 TX/PRO/DX INJ NEW DRUG ADDON: CPT | Mod: 59 | Performed by: EMERGENCY MEDICINE

## 2022-08-22 PROCEDURE — 84703 CHORIONIC GONADOTROPIN ASSAY: CPT | Performed by: EMERGENCY MEDICINE

## 2022-08-22 PROCEDURE — 250N000013 HC RX MED GY IP 250 OP 250 PS 637: Performed by: EMERGENCY MEDICINE

## 2022-08-22 PROCEDURE — 71260 CT THORAX DX C+: CPT

## 2022-08-22 PROCEDURE — 85025 COMPLETE CBC W/AUTO DIFF WBC: CPT | Performed by: EMERGENCY MEDICINE

## 2022-08-22 PROCEDURE — 96374 THER/PROPH/DIAG INJ IV PUSH: CPT | Mod: 59 | Performed by: EMERGENCY MEDICINE

## 2022-08-22 PROCEDURE — 250N000009 HC RX 250: Performed by: EMERGENCY MEDICINE

## 2022-08-22 PROCEDURE — 36415 COLL VENOUS BLD VENIPUNCTURE: CPT | Performed by: EMERGENCY MEDICINE

## 2022-08-22 PROCEDURE — 80048 BASIC METABOLIC PNL TOTAL CA: CPT | Performed by: EMERGENCY MEDICINE

## 2022-08-22 RX ORDER — LIDOCAINE HYDROCHLORIDE 20 MG/ML
15 SOLUTION OROPHARYNGEAL EVERY 6 HOURS PRN
Qty: 300 ML | Refills: 0 | Status: ON HOLD | OUTPATIENT
Start: 2022-08-22 | End: 2022-12-08

## 2022-08-22 RX ORDER — LIDOCAINE HYDROCHLORIDE 20 MG/ML
15 SOLUTION OROPHARYNGEAL EVERY 6 HOURS PRN
Qty: 300 ML | Refills: 0 | Status: SHIPPED | OUTPATIENT
Start: 2022-08-22 | End: 2022-08-22

## 2022-08-22 RX ORDER — HYDROMORPHONE HYDROCHLORIDE 1 MG/ML
0.5 INJECTION, SOLUTION INTRAMUSCULAR; INTRAVENOUS; SUBCUTANEOUS
Status: COMPLETED | OUTPATIENT
Start: 2022-08-22 | End: 2022-08-22

## 2022-08-22 RX ORDER — DIPHENHYDRAMINE HYDROCHLORIDE 50 MG/ML
25 INJECTION INTRAMUSCULAR; INTRAVENOUS ONCE
Status: COMPLETED | OUTPATIENT
Start: 2022-08-22 | End: 2022-08-22

## 2022-08-22 RX ORDER — IOPAMIDOL 755 MG/ML
100 INJECTION, SOLUTION INTRAVASCULAR ONCE
Status: COMPLETED | OUTPATIENT
Start: 2022-08-22 | End: 2022-08-22

## 2022-08-22 RX ORDER — HYDROXYZINE HYDROCHLORIDE 50 MG/1
50 TABLET, FILM COATED ORAL ONCE
Status: DISCONTINUED | OUTPATIENT
Start: 2022-08-22 | End: 2022-08-22 | Stop reason: HOSPADM

## 2022-08-22 RX ORDER — ALUMINA, MAGNESIA, AND SIMETHICONE 2400; 2400; 240 MG/30ML; MG/30ML; MG/30ML
15 SUSPENSION ORAL EVERY 6 HOURS PRN
Qty: 355 ML | Refills: 0 | Status: SHIPPED | OUTPATIENT
Start: 2022-08-22 | End: 2023-07-07

## 2022-08-22 RX ORDER — ALUMINA, MAGNESIA, AND SIMETHICONE 2400; 2400; 240 MG/30ML; MG/30ML; MG/30ML
15 SUSPENSION ORAL EVERY 6 HOURS PRN
Qty: 355 ML | Refills: 0 | Status: SHIPPED | OUTPATIENT
Start: 2022-08-22 | End: 2022-08-22

## 2022-08-22 RX ADMIN — HYDROMORPHONE HYDROCHLORIDE 0.5 MG: 1 INJECTION, SOLUTION INTRAMUSCULAR; INTRAVENOUS; SUBCUTANEOUS at 20:09

## 2022-08-22 RX ADMIN — IOPAMIDOL 100 ML: 755 INJECTION, SOLUTION INTRAVENOUS at 20:19

## 2022-08-22 RX ADMIN — LIDOCAINE HYDROCHLORIDE 30 ML: 20 SOLUTION ORAL; TOPICAL at 21:36

## 2022-08-22 RX ADMIN — DIPHENHYDRAMINE HYDROCHLORIDE 25 MG: 50 INJECTION, SOLUTION INTRAMUSCULAR; INTRAVENOUS at 20:47

## 2022-08-22 RX ADMIN — HYDROMORPHONE HYDROCHLORIDE 0.5 MG: 1 INJECTION, SOLUTION INTRAMUSCULAR; INTRAVENOUS; SUBCUTANEOUS at 18:29

## 2022-08-22 ASSESSMENT — ENCOUNTER SYMPTOMS
SHORTNESS OF BREATH: 1
SORE THROAT: 1
NECK STIFFNESS: 1
VOICE CHANGE: 1
CHOKING: 1
FEVER: 0
TROUBLE SWALLOWING: 1

## 2022-08-22 ASSESSMENT — ACTIVITIES OF DAILY LIVING (ADL)
ADLS_ACUITY_SCORE: 40
ADLS_ACUITY_SCORE: 40

## 2022-08-22 NOTE — ED TRIAGE NOTES
Pt arrives to the ED complaining of throat pain. Patient states that her uvula split in half about a week ago. She states that she previously swallowed a metal wire and that it was removed, but that they said they may have scratched her throat when it was removed. Patient states that eating or drinking hurts her throat.      Triage Assessment     Row Name 08/22/22 [id number]       Triage Assessment (Adult)    Airway WDL WDL       Respiratory WDL    Respiratory WDL WDL       Skin Circulation/Temperature WDL    Skin Circulation/Temperature WDL WDL       Cardiac WDL    Cardiac WDL WDL       Peripheral/Neurovascular WDL    Peripheral Neurovascular WDL WDL       Cognitive/Neuro/Behavioral WDL    Cognitive/Neuro/Behavioral WDL WDL

## 2022-08-22 NOTE — ED PROVIDER NOTES
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 2/20/2019    Procedure: Exam under anesthesia Colonoscopy with attempted  removal of rectal foreign body;  Surgeon: Estrada Chávez MD;  Location:  OR     Family History   Problem Relation Age of Onset     Diabetes Type 2  Maternal Grandmother      Diabetes Type 2  Paternal Grandmother      Breast Cancer Paternal Grandmother      Cerebrovascular Disease Father 53     Myocardial Infarction No family hx of      Coronary Artery Disease Early Onset No family hx of       Ovarian Cancer No family hx of      Colon Cancer No family hx of      Depression Mother      Anxiety Disorder Mother        Review of Systems  A complete review of systems was performed with pertinent positives and negatives noted in the HPI, and all other systems negative.    Physical Exam   BP: 127/59  Pulse: 94  Temp: 98.3  F (36.8  C)  Resp: 16  Weight: 135.2 kg (298 lb)  SpO2: 98 %  Physical Exam  Vitals and nursing note reviewed.   Constitutional:       Comments: Somewhat wide-eyed, anxious,   HENT:      Mouth/Throat:      Comments: Reveal a slightly bifid uvula with some white healing tissue that may not actually be infectious but may be healing in nature.  Pulmonary:      Effort: No respiratory distress.   Neurological:      General: No focal deficit present.      Mental Status: She is alert and oriented to person, place, and time.         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   Patient has had a hoarse voice for a while.   Outpatient ENT referral within 3-4 weeks, outpatient speech language pathology referral       Procedures          Orders Placed This Encounter   Procedures     XR Chest 1 View     XR Abdomen 1 View     Asymptomatic COVID-19 Virus (Coronavirus) by PCR     CRP inflammation     Basic metabolic panel     HCG qualitative Blood     Peripheral IV catheter     CBC with platelets differential     Results for orders placed or performed during the hospital encounter of 08/22/22   XR Chest 1 View     Status: None    Narrative    EXAM: XR CHEST 1 VIEW  8/22/2022 6:03 PM      HISTORY: hx of FB ingestion. Per chart, patient previously swallowed a  metal wire that was removed.    COMPARISON: Chest x-ray 7/28/2022    FINDINGS: PA radiograph of the chest. The trachea is midline. The  cardiac silhouette is not enlarged. No pleural effusion or  pneumothorax. No focal airspace  opacity. Visualized upper abdomen is  unremarkable. No radiodense foreign body. Convex left scoliotic  curvature of the thoracic spine.      Impression    IMPRESSION:   1. No radiodense foreign body.  2. No focal airspace opacity.    I have personally reviewed the examination and initial interpretation  and I agree with the findings.    ELE DENSON MD         SYSTEM ID:  [id number]   XR Abdomen 1 View     Status: None    Narrative    Exam: XR ABDOMEN 1 VIEW, 8/22/2022 6:01 PM    Indication: r/o FB ingestion    Comparison: Abdominal radiograph 8/13/2022    Findings:   Upright PA radiographs of the abdomen. Surgical clips project over the  right upper quadrant. No additional radiopaque foreign body.  Nonobstructive bowel gas pattern. No visualized pneumatosis or portal  venous gas. Pelvic phleboliths. S-shaped scoliotic curvature of the  thoracolumbar spine.      Impression    Impression:   1. No radiopaque foreign body.  2. Nonobstructive bowel gas pattern.    I have personally reviewed the examination and initial interpretation  and I agree with the findings.    ELE DENSON MD         SYSTEM ID:  [id number]   CT Chest w Contrast     Status: None    Narrative    EXAMINATION: CT CHEST W CONTRAST, 8/22/2022 8:37 PM    TECHNIQUE:  Helical CT images from the thoracic inlet through the  upper abdomen were obtained with intravenous and oral contrast.   Images are displayed at 1 and 5 mm intervals. Images reviewed in lung,  soft tissue, and bone windows.    Contrast: 100 mL Isovue-370    Radiation Dose (DLP): 753 mGy*cm    COMPARISON: Chest CT 2/13/2022    HISTORY: recent endoscopic esophageal foreign body removal with chest  pain and dysphagia, eval perforation    FINDINGS:    Lungs: The central tracheobronchial tree is patent. No pleural  effusion, pneumothorax, or focal consolidation. There are a few sub-6  mm solid pulmonary nodules, including a 3 mm solid pulmonary nodule in  the posterior right lower lobe (series 4,  image 252).    Mediastinum: No pneumomediastinum. There is contrast present within  the esophagus and stomach without evidence for esophageal perforation.  Mild diffuse thickening of the wall of the esophagus. Small sliding  hiatal hernia.    The thyroid gland is unremarkable. The heart is not enlarged. No  significant pericardial effusion. The ascending aorta and main  pulmonary artery are normal in caliber. No mediastinal or axillary  lymphadenopathy.    Upper abdomen: Diffuse hypoattenuation of the hepatic parenchyma,  consistent with hepatic steatosis. Cholecystectomy clips.    Bones/soft tissues: S-shaped scoliotic curvature of the thoracolumbar  spine. Mild degenerative changes in the spine. No acute or suspicious  osseous lesion.      Impression    IMPRESSION:   1. No pneumomediastinum or extravasation of contrast in the  mediastinum to suggest esophageal perforation. There is mild diffuse  thickening of the wall of the esophagus, suggesting esophagitis.  2. Hepatic steatosis.    I have personally reviewed the examination and initial interpretation  and I agree with the findings.    ELE DENSON MD         SYSTEM ID:  [id number]   Asymptomatic COVID-19 Virus (Coronavirus) by PCR Nasopharyngeal     Status: Normal    Specimen: Nasopharyngeal; Swab   Result Value Ref Range    SARS CoV2 PCR Negative Negative    Narrative    Testing was performed using the Xpert Xpress SARS-CoV-2 Assay on the  Cepheid Gene-Xpert Instrument Systems. Additional information about  this Emergency Use Authorization (EUA) assay can be found via the Lab  Guide. This test should be ordered for the detection of SARS-CoV-2 in  individuals who meet SARS-CoV-2 clinical and/or epidemiological  criteria. Test performance is unknown in asymptomatic patients. This  test is for in vitro diagnostic use under the FDA EUA for  laboratories certified under CLIA to perform high complexity testing.  This test has not been FDA cleared or approved. A  negative result  does not rule out the presence of PCR inhibitors in the specimen or  target RNA in concentration below the limit of detection for the  assay. The possibility of a false negative should be considered if  the patient's recent exposure or clinical presentation suggests  COVID-19. This test was validated by the [facility]. This laboratory is certified under  the Clinical Laboratory Improvement Amendments of 1988 (CLIA-88) as  qualified to perform high complexity laboratory testing.     CRP inflammation     Status: Abnormal   Result Value Ref Range    CRP Inflammation 33.00 (H) <5.00 mg/L   Basic metabolic panel     Status: Abnormal   Result Value Ref Range    Creatinine 0.67 0.51 - 0.95 mg/dL    Sodium 137 136 - 145 mmol/L    Potassium 4.0 3.4 - 5.3 mmol/L    Urea Nitrogen 14.9 6.0 - 20.0 mg/dL    Chloride 97 (L) 98 - 107 mmol/L    Carbon Dioxide (CO2) 28 22 - 29 mmol/L    Anion Gap 12 7 - 15 mmol/L    Glucose 115 (H) 70 - 99 mg/dL    GFR Estimate >90 >60 mL/min/1.73m2    Calcium 10.5 (H) 8.6 - 10.0 mg/dL   HCG qualitative Blood     Status: Normal   Result Value Ref Range    hCG Serum Qualitative Negative Negative   CBC with platelets and differential     Status: Abnormal   Result Value Ref Range    WBC Count 12.2 (H) 4.0 - 11.0 10e3/uL    RBC Count 4.61 3.80 - 5.20 10e6/uL    Hemoglobin 13.6 11.7 - 15.7 g/dL    Hematocrit 41.3 35.0 - 47.0 %    MCV 90 78 - 100 fL    MCH 29.5 26.5 - 33.0 pg    MCHC 32.9 31.5 - 36.5 g/dL    RDW 13.2 10.0 - 15.0 %    Platelet Count 296 150 - 450 10e3/uL    % Neutrophils 71 %    % Lymphocytes 20 %    % Monocytes 6 %    % Eosinophils 2 %    % Basophils 0 %    % Immature Granulocytes 1 %    NRBCs per 100 WBC 0 <1 /100    Absolute Neutrophils 8.8 (H) 1.6 - 8.3 10e3/uL    Absolute Lymphocytes 2.4 0.8 - 5.3 10e3/uL    Absolute Monocytes 0.7 0.0 - 1.3 10e3/uL    Absolute Eosinophils 0.2 0.0 - 0.7 10e3/uL    Absolute Basophils 0.1  0.0 - 0.2 10e3/uL    Absolute Immature Granulocytes 0.1 <=0.4 10e3/uL    Absolute NRBCs 0.0 10e3/uL   Extra Tube     Status: None    Narrative    The following orders were created for panel order Extra Tube.  Procedure                               Abnormality         Status                     ---------                               -----------         ------                     Extra Blue Top Tube[[id number]]                              Final result                 Please view results for these tests on the individual orders.   Extra Blue Top Tube     Status: None   Result Value Ref Range    Hold Specimen [facility]    D dimer quantitative     Status: Normal   Result Value Ref Range    D-Dimer Quantitative 0.42 0.00 - 0.50 ug/mL FEU    Narrative    This D-dimer assay is intended for use in conjunction with a clinical pretest probability assessment model to exclude pulmonary embolism (PE) and deep venous thrombosis (DVT) in outpatients suspected of PE or DVT. The cut-off value is 0.50 ug/mL FEU.   CBC with platelets differential     Status: Abnormal    Narrative    The following orders were created for panel order CBC with platelets differential.  Procedure                               Abnormality         Status                     ---------                               -----------         ------                     CBC with platelets and d...[[id number]]  Abnormal            Final result                 Please view results for these tests on the individual orders.       Medications   sodium chloride (PF) 0.9% PF flush 3 mL (has no administration in time range)   sodium chloride (PF) 0.9% PF flush 3 mL (has no administration in time range)        Assessments & Plan (with Medical Decision Making)     I have reviewed the nursing notes.    Because of the patient's recurrent ingestion of foreign object history the patient will need to be ruled out for a new foreign body ingestion.  Patient was seen in triage and labs were  ordered as well as x-rays.  Labs will also be drawn and if these are elevated it may be reasonable to do a CT of the patient's neck to rule out soft tissue abscess however if the labs are unremarkable I believe the tissue on the patient's uvula is healing in nature and not infectious.      Bruce Pryor MD, MD    [facility] EMERGENCY DEPARTMENT  8/22/2022     Bruce Pryor MD  08/22/22 1720       Bruce Pryor MD  09/20/22 1011

## 2022-08-22 NOTE — ED PROVIDER NOTES
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 2  Maternal Grandmother      Diabetes Type 2  Paternal Grandmother      Breast Cancer Paternal Grandmother      Cerebrovascular Disease Father 53     Myocardial Infarction No family hx of      Coronary Artery Disease Early Onset No family hx of      Ovarian Cancer No family hx of      Colon Cancer No family hx of      Depression Mother      Anxiety Disorder Mother      Social History   Social History     Tobacco Use     Smoking status: Never Smoker     Smokeless tobacco: Never Used    Substance Use Topics     Alcohol use: No     Alcohol/week: 0.0 standard drinks     Drug use: No      Past medical history, past surgical history, medications, allergies, family history, and social history were reviewed with the patient. No additional pertinent items.       Review of Systems   Constitutional: Negative for fever.   HENT: Positive for sore throat, trouble swallowing and voice change.    Respiratory: Positive for choking and shortness of breath.    Cardiovascular: Positive for chest pain (Left Sided).   Musculoskeletal: Positive for neck stiffness.   All other systems reviewed and are negative.    A complete review of systems was performed with pertinent positives and negatives noted in the HPI, and all other systems negative.    Physical Exam   BP: 127/59  Pulse: 94  Temp: 98.3  F (36.8  C)  Resp: 16  Weight: 135.2 kg (298 lb)  SpO2: 98 %  Physical Exam  Vitals and nursing note reviewed.   Constitutional:       General: She is not in acute distress.     Appearance: She is well-developed. She is obese. She is not ill-appearing or diaphoretic.   HENT:      Head: Normocephalic and atraumatic.      Jaw: No trismus.      Nose: Nose normal. No congestion or rhinorrhea.      Mouth/Throat:      Lips: No lesions.      Mouth: Mucous membranes are moist. Injury present. No angioedema.      Tongue: No lesions.      Pharynx: Uvula midline. Pharyngeal swelling, posterior oropharyngeal erythema and uvula swelling present. No oropharyngeal exudate.      Tonsils: No tonsillar exudate or tonsillar abscesses.        Comments: Mild erythema and swelling of the posterior oropharynx and tonsillar pillars.  No exudates.  There is also erythema and trauma to the uvula without purulent discharge.  Eyes:      General: No scleral icterus.     Conjunctiva/sclera: Conjunctivae normal.   Neck:      Thyroid: No thyroid mass.      Trachea: Phonation normal. No abnormal tracheal secretions.   Cardiovascular:      Rate and Rhythm:  Normal rate.   Pulmonary:      Effort: Pulmonary effort is normal. No respiratory distress.      Breath sounds: No stridor.   Abdominal:      General: There is no distension.      Palpations: Abdomen is soft.      Tenderness: There is no abdominal tenderness.   Musculoskeletal:         General: No deformity or signs of injury. Normal range of motion.      Cervical back: Normal range of motion and neck supple. No edema, erythema, rigidity or crepitus. Pain with movement present. Normal range of motion.   Lymphadenopathy:      Cervical: No cervical adenopathy.   Skin:     General: Skin is warm and dry.      Coloration: Skin is not jaundiced or pale.   Neurological:      General: No focal deficit present.      Mental Status: She is alert and oriented to person, place, and time.   Psychiatric:         Mood and Affect: Mood normal.         Behavior: Behavior normal.         ED Course     ED Course as of 08/22/22 2113   Mon Aug 22, 2022   2038 ENT scoped patient and found left vocal cord paresis, making her at increased risk for aspiration.   Patient has had a hoarse voice for a while.   Outpatient ENT referral within 3-4 weeks, outpatient speech language pathology referral       Procedures                     Results for orders placed or performed during the hospital encounter of 08/22/22   XR Chest 1 View     Status: None    Narrative    EXAM: XR CHEST 1 VIEW  8/22/2022 6:03 PM      HISTORY: hx of FB ingestion. Per chart, patient previously swallowed a  metal wire that was removed.    COMPARISON: Chest x-ray 7/28/2022    FINDINGS: PA radiograph of the chest. The trachea is midline. The  cardiac silhouette is not enlarged. No pleural effusion or  pneumothorax. No focal airspace opacity. Visualized upper abdomen is  unremarkable. No radiodense foreign body. Convex left scoliotic  curvature of the thoracic spine.      Impression    IMPRESSION:   1. No radiodense foreign body.  2. No focal airspace opacity.    I have  personally reviewed the examination and initial interpretation  and I agree with the findings.    ELE DENSON MD         SYSTEM ID:  [id number]   XR Abdomen 1 View     Status: None    Narrative    Exam: XR ABDOMEN 1 VIEW, 8/22/2022 6:01 PM    Indication: r/o FB ingestion    Comparison: Abdominal radiograph 8/13/2022    Findings:   Upright PA radiographs of the abdomen. Surgical clips project over the  right upper quadrant. No additional radiopaque foreign body.  Nonobstructive bowel gas pattern. No visualized pneumatosis or portal  venous gas. Pelvic phleboliths. S-shaped scoliotic curvature of the  thoracolumbar spine.      Impression    Impression:   1. No radiopaque foreign body.  2. Nonobstructive bowel gas pattern.    I have personally reviewed the examination and initial interpretation  and I agree with the findings.    ELE DENSON MD         SYSTEM ID:  [id number]   CT Chest w Contrast     Status: None (Preliminary result)    Impression    RESIDENT PRELIMINARY INTERPRETATION  IMPRESSION:   1. No pneumomediastinum or extravasation of contrast in the  mediastinum to suggest esophageal perforation. There is mild diffuse  thickening of the wall of the esophagus, which may represent  irritation from recent EGD.  2. Hepatic steatosis.   Asymptomatic COVID-19 Virus (Coronavirus) by PCR Nasopharyngeal     Status: Normal    Specimen: Nasopharyngeal; Swab   Result Value Ref Range    SARS CoV2 PCR Negative Negative    Narrative    Testing was performed using the Xpert Xpress SARS-CoV-2 Assay on the  [employer]. Additional information about  this Emergency Use Authorization (EUA) assay can be found via the Lab  Guide. This test should be ordered for the detection of SARS-CoV-2 in  individuals who meet SARS-CoV-2 clinical and/or epidemiological  criteria. Test performance is unknown in asymptomatic patients. This  test is for in vitro diagnostic use under the FDA EUA for  laboratories  certified under CLIA to perform high complexity testing.  This test has not been FDA cleared or approved. A negative result  does not rule out the presence of PCR inhibitors in the specimen or  target RNA in concentration below the limit of detection for the  assay. The possibility of a false negative should be considered if  the patient's recent exposure or clinical presentation suggests  COVID-19. This test was validated by the [facility]. This laboratory is certified under  the Clinical Laboratory Improvement Amendments of 1988 (CLIA-88) as  qualified to perform high complexity laboratory testing.     CRP inflammation     Status: Abnormal   Result Value Ref Range    CRP Inflammation 33.00 (H) <5.00 mg/L   Basic metabolic panel     Status: Abnormal   Result Value Ref Range    Creatinine 0.67 0.51 - 0.95 mg/dL    Sodium 137 136 - 145 mmol/L    Potassium 4.0 3.4 - 5.3 mmol/L    Urea Nitrogen 14.9 6.0 - 20.0 mg/dL    Chloride 97 (L) 98 - 107 mmol/L    Carbon Dioxide (CO2) 28 22 - 29 mmol/L    Anion Gap 12 7 - 15 mmol/L    Glucose 115 (H) 70 - 99 mg/dL    GFR Estimate >90 >60 mL/min/1.73m2    Calcium 10.5 (H) 8.6 - 10.0 mg/dL   HCG qualitative Blood     Status: Normal   Result Value Ref Range    hCG Serum Qualitative Negative Negative   CBC with platelets and differential     Status: Abnormal   Result Value Ref Range    WBC Count 12.2 (H) 4.0 - 11.0 10e3/uL    RBC Count 4.61 3.80 - 5.20 10e6/uL    Hemoglobin 13.6 11.7 - 15.7 g/dL    Hematocrit 41.3 35.0 - 47.0 %    MCV 90 78 - 100 fL    MCH 29.5 26.5 - 33.0 pg    MCHC 32.9 31.5 - 36.5 g/dL    RDW 13.2 10.0 - 15.0 %    Platelet Count 296 150 - 450 10e3/uL    % Neutrophils 71 %    % Lymphocytes 20 %    % Monocytes 6 %    % Eosinophils 2 %    % Basophils 0 %    % Immature Granulocytes 1 %    NRBCs per 100 WBC 0 <1 /100    Absolute Neutrophils 8.8 (H) 1.6 - 8.3 10e3/uL    Absolute Lymphocytes 2.4 0.8 - 5.3 10e3/uL     Absolute Monocytes 0.7 0.0 - 1.3 10e3/uL    Absolute Eosinophils 0.2 0.0 - 0.7 10e3/uL    Absolute Basophils 0.1 0.0 - 0.2 10e3/uL    Absolute Immature Granulocytes 0.1 <=0.4 10e3/uL    Absolute NRBCs 0.0 10e3/uL   Extra Tube     Status: None    Narrative    The following orders were created for panel order Extra Tube.  Procedure                               Abnormality         Status                     ---------                               -----------         ------                     Extra Blue Top Tube[[id number]]                              Final result                 Please view results for these tests on the individual orders.   Extra Blue Top Tube     Status: None   Result Value Ref Range    Hold Specimen JI    D dimer quantitative     Status: Normal   Result Value Ref Range    D-Dimer Quantitative 0.42 0.00 - 0.50 ug/mL FEU    Narrative    This D-dimer assay is intended for use in conjunction with a clinical pretest probability assessment model to exclude pulmonary embolism (PE) and deep venous thrombosis (DVT) in outpatients suspected of PE or DVT. The cut-off value is 0.50 ug/mL FEU.   CBC with platelets differential     Status: Abnormal    Narrative    The following orders were created for panel order CBC with platelets differential.  Procedure                               Abnormality         Status                     ---------                               -----------         ------                     CBC with platelets and d...[[id number]]  Abnormal            Final result                 Please view results for these tests on the individual orders.     Medications   sodium chloride (PF) 0.9% PF flush 3 mL (3 mLs Intracatheter Not Given 8/22/22 1840)   sodium chloride (PF) 0.9% PF flush 3 mL (has no administration in time range)   lidocaine (viscous) (XYLOCAINE) 2 % 15 mL, alum & mag hydroxide-simethicone (MAALOX) 15 mL GI Cocktail (has no administration in time range)   HYDROmorphone (PF)  (DILAUDID) injection 0.5 mg (0.5 mg Intravenous Given 8/22/22 1829)   HYDROmorphone (PF) (DILAUDID) injection 0.5 mg (0.5 mg Intravenous Given 8/22/22 2009)   iopamidol (ISOVUE-370) solution 100 mL (100 mLs Intravenous Given 8/22/22 2019)   sodium chloride (PF) 0.9% PF flush 83 mL (83 mLs Intravenous Given 8/22/22 2019)   diphenhydrAMINE (BENADRYL) injection 25 mg (25 mg Intravenous Given 8/22/22 2047)        Assessments & Plan (with Medical Decision Making)   Nevin Alvarado is a 31 year old female well-known to the emergency department for complex psychosocial history including depression, PTSD, borderline personality disorder, and frequent emergency department visits for foreign body insertion in rectum/vagina or swallowing objects who presents to the ED today with a chief complaint of pharyngitis after swallowing a metal wire requiring removal via EGD on 8/16/22.     Ddx: Oropharyngeal trauma, retropharyngeal abscess/cellulitis, oropharyngeal cellulitis, esophageal perforation, dysphagia    ENT consulted for nasopharyngeal scope to directly visualize the oropharyngeal trauma and screen for evidence of infection.  Patient had a CT of tissue of the neck performed recently which was within normal limits.  She is now complaining of chest pain and pleurisy that feels similar to when she had an esophageal perforation.  Review of the GI endoscopic procedure note, there was no esophageal trauma noted on reassessment after foreign body removal.  Patient is nontoxic-appearing and has no apparent airway compromise on examination.  No respiratory distress.  He is tolerating her oral secretions but does look uncomfortable and has moderate oropharyngeal swelling with a traumatic abrasion to the oral mucosa.  Discussed with radiology who recommended getting a CT of the chest with IV and oral contrast to be timed to right before the scan.  They will protocol the study to evaluate for esophageal perforation.  X-rays of the  chest and abdomen negative for evidence of ingested foreign body.  Patient also denies ingesting anything today.  D-dimer negative so defer CT PE study.  Given IV Dilaudid for oropharyngeal pain.  Creatinine and electrolytes within normal limits so it does not appear that the patient is become dehydrated from decreased p.o. intake.  White count 12.2 with normal hemoglobin.  CT of the chest shows no pneumomediastinum or extravasation of contrast to suggest esophageal perforation.  Mild diffuse wall thickening of the esophagus which may represent irritation from recent EGD or previous trauma from multiple episodes of esophageal foreign body ingestion.  Patient reassured by these findings.  ENT saw the patient and did a nasopharyngeal scope in the emergency department.  They discovered a new left vocal cord paresis but no evidence of infection or abscess.  There is minimal healing trauma to the oropharyngeal mucosa.  They recommended the patient follow-up in ENT clinic and speech-language therapy for further evaluation of the vocal cord dysfunction.  Formal referrals were placed and the recommendations were copied into the patient's AVS for her group home.  Before discharge, the patient was given a GI cocktail.  She was able to tolerate a sandwich lunch box.  Recommended ongoing pain control with over-the-counter NSAIDs and topical analgesics for throat pain.  Recommended continuing a liquid or soft diet until improvement of her pain.  Detailed return precautions provided.    I have reviewed the nursing notes. I have reviewed the findings, diagnosis, plan and need for follow up with the patient.    New Prescriptions    No medications on file       Final diagnoses:   Paresis of left vocal cord   Throat pain       --  IJacklyn, am serving as a trained medical scribe to document services personally performed by Inessa Barlow MD, based on the provider's statements to me.     IInessa MD, was physically  present and have reviewed and verified the accuracy of this note documented by Jacklyn Mendosa.    Inessa Barlow MD  [facility] EMERGENCY DEPARTMENT  8/22/2022     Inessa Barlow MD  08/22/22 2120

## 2022-08-23 ENCOUNTER — APPOINTMENT (OUTPATIENT)
Dept: GENERAL RADIOLOGY | Facility: CLINIC | Age: 32
End: 2022-08-23
Attending: EMERGENCY MEDICINE
Payer: COMMERCIAL

## 2022-08-23 ENCOUNTER — HOSPITAL ENCOUNTER (OUTPATIENT)
Facility: CLINIC | Age: 32
Setting detail: OBSERVATION
Discharge: HOME | End: 2022-08-25
Attending: EMERGENCY MEDICINE | Admitting: STUDENT IN AN ORGANIZED HEALTH CARE EDUCATION/TRAINING PROGRAM
Payer: COMMERCIAL

## 2022-08-23 DIAGNOSIS — T18.9XXA SWALLOWED FOREIGN BODY, INITIAL ENCOUNTER: ICD-10-CM

## 2022-08-23 LAB
BASOPHILS # BLD AUTO: 0 10E3/UL (ref 0–0.2)
BASOPHILS NFR BLD AUTO: 0 %
EOSINOPHIL # BLD AUTO: 0.2 10E3/UL (ref 0–0.7)
EOSINOPHIL NFR BLD AUTO: 2 %
ERYTHROCYTE [DISTWIDTH] IN BLOOD BY AUTOMATED COUNT: 13.2 % (ref 10–15)
HCT VFR BLD AUTO: 40.5 % (ref 35–47)
HGB BLD-MCNC: 13.3 G/DL (ref 11.7–15.7)
HOLD SPECIMEN: NORMAL
IMM GRANULOCYTES # BLD: 0.1 10E3/UL
IMM GRANULOCYTES NFR BLD: 1 %
LYMPHOCYTES # BLD AUTO: 1.9 10E3/UL (ref 0.8–5.3)
LYMPHOCYTES NFR BLD AUTO: 21 %
MCH RBC QN AUTO: 29.6 PG (ref 26.5–33)
MCHC RBC AUTO-ENTMCNC: 32.8 G/DL (ref 31.5–36.5)
MCV RBC AUTO: 90 FL (ref 78–100)
MONOCYTES # BLD AUTO: 0.7 10E3/UL (ref 0–1.3)
MONOCYTES NFR BLD AUTO: 8 %
NEUTROPHILS # BLD AUTO: 6.1 10E3/UL (ref 1.6–8.3)
NEUTROPHILS NFR BLD AUTO: 68 %
NRBC # BLD AUTO: 0 10E3/UL
NRBC BLD AUTO-RTO: 0 /100
PLATELET # BLD AUTO: 277 10E3/UL (ref 150–450)
RBC # BLD AUTO: 4.5 10E6/UL (ref 3.8–5.2)
WBC # BLD AUTO: 8.9 10E3/UL (ref 4–11)

## 2022-08-23 PROCEDURE — 85610 PROTHROMBIN TIME: CPT | Performed by: STUDENT IN AN ORGANIZED HEALTH CARE EDUCATION/TRAINING PROGRAM

## 2022-08-23 PROCEDURE — 96376 TX/PRO/DX INJ SAME DRUG ADON: CPT

## 2022-08-23 PROCEDURE — 36415 COLL VENOUS BLD VENIPUNCTURE: CPT | Performed by: EMERGENCY MEDICINE

## 2022-08-23 PROCEDURE — U0005 INFEC AGEN DETEC AMPLI PROBE: HCPCS | Performed by: EMERGENCY MEDICINE

## 2022-08-23 PROCEDURE — 250N000011 HC RX IP 250 OP 636: Performed by: EMERGENCY MEDICINE

## 2022-08-23 PROCEDURE — 96361 HYDRATE IV INFUSION ADD-ON: CPT

## 2022-08-23 PROCEDURE — 74019 RADEX ABDOMEN 2 VIEWS: CPT | Mod: 26 | Performed by: RADIOLOGY

## 2022-08-23 PROCEDURE — 80048 BASIC METABOLIC PNL TOTAL CA: CPT | Performed by: STUDENT IN AN ORGANIZED HEALTH CARE EDUCATION/TRAINING PROGRAM

## 2022-08-23 PROCEDURE — 99285 EMERGENCY DEPT VISIT HI MDM: CPT | Performed by: EMERGENCY MEDICINE

## 2022-08-23 PROCEDURE — 71046 X-RAY EXAM CHEST 2 VIEWS: CPT

## 2022-08-23 PROCEDURE — 120N000002 HC R&B MED SURG/OB UMMC

## 2022-08-23 PROCEDURE — 96374 THER/PROPH/DIAG INJ IV PUSH: CPT

## 2022-08-23 PROCEDURE — 85025 COMPLETE CBC W/AUTO DIFF WBC: CPT | Performed by: EMERGENCY MEDICINE

## 2022-08-23 PROCEDURE — 71046 X-RAY EXAM CHEST 2 VIEWS: CPT | Mod: 26 | Performed by: RADIOLOGY

## 2022-08-23 PROCEDURE — 258N000003 HC RX IP 258 OP 636: Performed by: EMERGENCY MEDICINE

## 2022-08-23 PROCEDURE — 96375 TX/PRO/DX INJ NEW DRUG ADDON: CPT

## 2022-08-23 PROCEDURE — 99285 EMERGENCY DEPT VISIT HI MDM: CPT | Mod: 25

## 2022-08-23 PROCEDURE — 74019 RADEX ABDOMEN 2 VIEWS: CPT

## 2022-08-23 RX ORDER — HYDROMORPHONE HYDROCHLORIDE 1 MG/ML
0.5 INJECTION, SOLUTION INTRAMUSCULAR; INTRAVENOUS; SUBCUTANEOUS
Status: COMPLETED | OUTPATIENT
Start: 2022-08-23 | End: 2022-08-23

## 2022-08-23 RX ORDER — SODIUM CHLORIDE 9 MG/ML
INJECTION, SOLUTION INTRAVENOUS CONTINUOUS
Status: ACTIVE | OUTPATIENT
Start: 2022-08-23 | End: 2022-08-24

## 2022-08-23 RX ORDER — HYDROMORPHONE HYDROCHLORIDE 1 MG/ML
0.5 INJECTION, SOLUTION INTRAMUSCULAR; INTRAVENOUS; SUBCUTANEOUS
Status: COMPLETED | OUTPATIENT
Start: 2022-08-23 | End: 2022-08-24

## 2022-08-23 RX ORDER — DIPHENHYDRAMINE HCL 50 MG
50 CAPSULE ORAL ONCE
Status: COMPLETED | OUTPATIENT
Start: 2022-08-23 | End: 2022-08-24

## 2022-08-23 RX ORDER — ONDANSETRON 2 MG/ML
4 INJECTION INTRAMUSCULAR; INTRAVENOUS EVERY 30 MIN PRN
Status: DISCONTINUED | OUTPATIENT
Start: 2022-08-23 | End: 2022-08-25 | Stop reason: HOSPADM

## 2022-08-23 RX ADMIN — ONDANSETRON 4 MG: 2 INJECTION INTRAMUSCULAR; INTRAVENOUS at 21:20

## 2022-08-23 RX ADMIN — SODIUM CHLORIDE 1000 ML: 9 INJECTION, SOLUTION INTRAVENOUS at 21:20

## 2022-08-23 RX ADMIN — HYDROMORPHONE HYDROCHLORIDE 0.5 MG: 1 INJECTION, SOLUTION INTRAMUSCULAR; INTRAVENOUS; SUBCUTANEOUS at 21:21

## 2022-08-23 RX ADMIN — HYDROMORPHONE HYDROCHLORIDE 0.5 MG: 1 INJECTION, SOLUTION INTRAMUSCULAR; INTRAVENOUS; SUBCUTANEOUS at 22:57

## 2022-08-23 ASSESSMENT — ENCOUNTER SYMPTOMS
ABDOMINAL PAIN: 1
NAUSEA: 1

## 2022-08-23 ASSESSMENT — ACTIVITIES OF DAILY LIVING (ADL)
ADLS_ACUITY_SCORE: 40
ADLS_ACUITY_SCORE: 40

## 2022-08-23 NOTE — CONSULTS
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 name: Not on file     Number of children: Not on file     Years of education: Not on file     Highest education level: Not on file   Occupational History     Occupation: On disability   Tobacco Use     Smoking status: Never Smoker     Smokeless tobacco: Never Used   Vaping Use     Vaping Use: Not on file   Substance and Sexual Activity     Alcohol use: No     Alcohol/week: 0.0 standard drinks     Drug  use: No     Sexual activity: Not Currently     Partners: Male     Birth control/protection: I.U.D.     Comment: IUD - Mirena - placed July, 2015   Other Topics Concern     Parent/sibling w/ CABG, MI or angioplasty before 65F 55M? Not Asked   Social History Narrative    Single.    Living in independent living portion of [employer].    On disability.    No regular exercise.      Social Determinants of Health     Financial Resource Strain: Not on file   Food Insecurity: Not on file   Transportation Needs: Not on file   Physical Activity: Not on file   Stress: Not on file   Social Connections: Not on file   Intimate Partner Violence: Not on file   Housing Stability: Not on file       Family History   Problem Relation Age of Onset     Diabetes Type 2  Maternal Grandmother      Diabetes Type 2  Paternal Grandmother      Breast Cancer Paternal Grandmother      Cerebrovascular Disease Father 53     Myocardial Infarction No family hx of      Coronary Artery Disease Early Onset No family hx of      Ovarian Cancer No family hx of      Colon Cancer No family hx of      Depression Mother      Anxiety Disorder Mother        ROS: 12 point review of systems is negative unless noted in HPI.    PHYSICAL EXAM:  BP (!) 145/96   Pulse 99   Temp 98.3  F (36.8  C) (Oral)   Resp 16   Wt 135.2 kg (298 lb)   LMP  (LMP Unknown)   SpO2 96%   BMI 54.50 kg/m    General: laying in bed, no acute distress  HEAD: normocephalic, atraumatic  Face: symmetrical, right lower marginal mandibular facial nerve weakness observed, no swelling, edema, or erythema. Sensation V1-V3 intact and equal bilaterally.   Eyes: EOMI without spontaneous or gaze evoked nystagmus, PERRL, clear sclera  Ears: no tragal tenderness  Nose: no anterior drainage, intact and midline septum  Mouth: moist,  tongue midline and symmetric  Oropharynx: tonsils within normal limits, uvula with traumatic separation seen on left side, approximately left 1/4 of the uvula is  split vertically full-thickness  with granulation tissue present. Does not appear infected or purulent. Some bruising noted on the right palatopharyngeal fold  Neck: no LAD, trachea midline, good range of motion in all four directions  Neuro: cranial nerves 2-12 grossly intact  Respiratory: breathing non-labored on RA, no stridor  Skin: no rashes or skin lesions of the face/neck  Psych: pleasant affect  Cardio: extremities warm and well perfused     FIBEROPTIC ENDOSCOPY:  Due to concern for retained foreign body, fiberoptic laryngoscopy was indicated. After obtaining verbal consent, the fiberoptic laryngoscope was passed under endoscopic vision through the left nasal passage. The turbinates were normal. The inferior and middle meati were clear bilaterally without purulence, masses, or polyps. The nasopharynx was clear. The eustachian tubes were clear. The soft palate appeared normal with good mobility. The epiglottis was sharp, and the visualized portion of the vallecula was clear. The left TVC appears paretic, the right TVC is mobile, there is a considerable glottic gap with adduction. There was no pooling in the hypopharynx.    ROUTINE IP LABS (Last four results)  BMP  Recent Labs   Lab 08/22/22  1815      POTASSIUM 4.0   CHLORIDE 97*   KELBY 10.5*   CO2 28   BUN 14.9   CR 0.67   *     CBC  Recent Labs   Lab 08/22/22  1815   WBC 12.2*   RBC 4.61   HGB 13.6   HCT 41.3   MCV 90   MCH 29.5   MCHC 32.9   RDW 13.2        INRNo lab results found in last 7 days.    Imaging:  Results for orders placed or performed during the hospital encounter of 08/22/22   XR Chest 1 View    Narrative    EXAM: XR CHEST 1 VIEW  8/22/2022 6:03 PM      HISTORY: hx of FB ingestion. Per chart, patient previously swallowed a  metal wire that was removed.    COMPARISON: Chest x-ray 7/28/2022    FINDINGS: PA radiograph of the chest. The trachea is midline. The  cardiac silhouette is not enlarged. No pleural effusion  or  pneumothorax. No focal airspace opacity. Visualized upper abdomen is  unremarkable. No radiodense foreign body. Convex left scoliotic  curvature of the thoracic spine.      Impression    IMPRESSION:   1. No radiodense foreign body.  2. No focal airspace opacity.    I have personally reviewed the examination and initial interpretation  and I agree with the findings.    ELE DENSON MD         SYSTEM ID:  [id number]   XR Abdomen 1 View    Narrative    Exam: XR ABDOMEN 1 VIEW, 8/22/2022 6:01 PM    Indication: r/o FB ingestion    Comparison: Abdominal radiograph 8/13/2022    Findings:   Upright PA radiographs of the abdomen. Surgical clips project over the  right upper quadrant. No additional radiopaque foreign body.  Nonobstructive bowel gas pattern. No visualized pneumatosis or portal  venous gas. Pelvic phleboliths. S-shaped scoliotic curvature of the  thoracolumbar spine.      Impression    Impression:   1. No radiopaque foreign body.  2. Nonobstructive bowel gas pattern.    I have personally reviewed the examination and initial interpretation  and I agree with the findings.    ELE DENSON MD         SYSTEM ID:  [id number]     *Note: Due to a large number of results and/or encounters for the requested time period, some results have not been displayed. A complete set of results can be found in Results Review.       Assessment and Plan  Nevin Alvarado is a 31 year old female with complex past psychiatric history including multiple prior ingestions requiring numerous EGD's as well as rectal foreign bodies requiring colonoscopy.  Does not appear to have any foreign body visualized on flexible laryngoscopy, was observed to have an immobile left TVC of unknown duration. She is unable to recall any history of prior diagnosis of vocal cord paresis, prolonged intubation, or recent intubation. It is difficult to tell the source or chronicity of the vocal cord paresis on a single exam. Extensive discussion was had  regarding risks of unilateral vocal cord paresis (ie: Hoarseness, aspiration, etc) and options for further management. We will have her see us in clinic outpatient as well as SLP for further evaluation and treatment.    - Uvula with a full-thickness separation, this is unlikely to heal together, will likely heal bifid. Recommend salt-and-soda rinses BID. Does not appear infected and would not recommend further antibiotics.  - SLP outpatient referral for left TVC paresis, dysphonia and for dysphagia eval given subjective coughing with PO intake, rule-out aspiration.   - Follow-up with laryngologist in 3-4 weeks for evaluation of the left vocal cord immobility (we will coordinate)  - Please contact Otolaryngology on call with any questions or concerns    Patient and plan was discussed with staff Dr. Anand Díaz MD  Otolaryngology Head and Neck Surgery Resident, PGY-3  Please page on call Otolaryngology resident with questions.

## 2022-08-23 NOTE — DISCHARGE INSTRUCTIONS
Please make an appointment to follow up with speech language therapy and [facility] (phone: [phone]) in 3 to 4 weeks for further evaluation of your left vocal cord paresis.    You were given a referral to ENT clinic and speech language therapy. Someone should call you to set up this appointment, but please call the number listed above, if you do not hear from someone within 1-2 business days.     You may follow a soft or liquid diet for the next few days until your sore throat improves.  Be sure to drink enough fluids with electrolytes.  You may take over-the-counter pain medications including Tylenol and ibuprofen.  You can also try over-the-counter topical pain treatments such as cough drops or throat sprays.    Return to the emergency department if you develop worsening pain, difficulty swallowing, difficulty breathing, chest pain,  coughing up blood, or fever.

## 2022-08-24 ENCOUNTER — APPOINTMENT (OUTPATIENT)
Dept: ULTRASOUND IMAGING | Facility: CLINIC | Age: 32
End: 2022-08-24
Attending: PHYSICIAN ASSISTANT
Payer: COMMERCIAL

## 2022-08-24 LAB
ANION GAP SERPL CALCULATED.3IONS-SCNC: 13 MMOL/L (ref 7–15)
ANION GAP SERPL CALCULATED.3IONS-SCNC: 8 MMOL/L (ref 7–15)
BUN SERPL-MCNC: 14.1 MG/DL (ref 6–20)
BUN SERPL-MCNC: 15.8 MG/DL (ref 6–20)
CALCIUM SERPL-MCNC: 8.8 MG/DL (ref 8.6–10)
CALCIUM SERPL-MCNC: 9.4 MG/DL (ref 8.6–10)
CHLORIDE SERPL-SCNC: 104 MMOL/L (ref 98–107)
CHLORIDE SERPL-SCNC: 99 MMOL/L (ref 98–107)
CREAT SERPL-MCNC: 0.57 MG/DL (ref 0.51–0.95)
CREAT SERPL-MCNC: 0.61 MG/DL (ref 0.51–0.95)
DEPRECATED HCO3 PLAS-SCNC: 27 MMOL/L (ref 22–29)
DEPRECATED HCO3 PLAS-SCNC: 29 MMOL/L (ref 22–29)
GFR SERPL CREATININE-BSD FRML MDRD: >90 ML/MIN/1.73M2
GFR SERPL CREATININE-BSD FRML MDRD: >90 ML/MIN/1.73M2
GLUCOSE SERPL-MCNC: 129 MG/DL (ref 70–99)
GLUCOSE SERPL-MCNC: 161 MG/DL (ref 70–99)
INR PPP: 1 (ref 0.85–1.15)
POTASSIUM SERPL-SCNC: 4 MMOL/L (ref 3.4–5.3)
POTASSIUM SERPL-SCNC: 4.3 MMOL/L (ref 3.4–5.3)
SARS-COV-2 RNA RESP QL NAA+PROBE: NEGATIVE
SODIUM SERPL-SCNC: 139 MMOL/L (ref 136–145)
SODIUM SERPL-SCNC: 141 MMOL/L (ref 136–145)
UPPER GI ENDOSCOPY: NORMAL

## 2022-08-24 PROCEDURE — 258N000003 HC RX IP 258 OP 636: Performed by: EMERGENCY MEDICINE

## 2022-08-24 PROCEDURE — 250N000011 HC RX IP 250 OP 636: Performed by: STUDENT IN AN ORGANIZED HEALTH CARE EDUCATION/TRAINING PROGRAM

## 2022-08-24 PROCEDURE — G0500 MOD SEDAT ENDO SERVICE >5YRS: HCPCS | Performed by: INTERNAL MEDICINE

## 2022-08-24 PROCEDURE — 999N000157 HC STATISTIC RCP TIME EA 10 MIN

## 2022-08-24 PROCEDURE — 80048 BASIC METABOLIC PNL TOTAL CA: CPT | Performed by: PHYSICIAN ASSISTANT

## 2022-08-24 PROCEDURE — 36415 COLL VENOUS BLD VENIPUNCTURE: CPT | Performed by: PHYSICIAN ASSISTANT

## 2022-08-24 PROCEDURE — 76770 US EXAM ABDO BACK WALL COMP: CPT | Mod: 26 | Performed by: RADIOLOGY

## 2022-08-24 PROCEDURE — 99220 PR INITIAL OBSERVATION CARE,LEVEL III: CPT | Mod: GC | Performed by: STUDENT IN AN ORGANIZED HEALTH CARE EDUCATION/TRAINING PROGRAM

## 2022-08-24 PROCEDURE — G0378 HOSPITAL OBSERVATION PER HR: HCPCS

## 2022-08-24 PROCEDURE — 250N000011 HC RX IP 250 OP 636: Performed by: EMERGENCY MEDICINE

## 2022-08-24 PROCEDURE — 99232 SBSQ HOSP IP/OBS MODERATE 35: CPT | Performed by: PSYCHIATRY & NEUROLOGY

## 2022-08-24 PROCEDURE — 250N000011 HC RX IP 250 OP 636: Performed by: INTERNAL MEDICINE

## 2022-08-24 PROCEDURE — 250N000013 HC RX MED GY IP 250 OP 250 PS 637: Performed by: EMERGENCY MEDICINE

## 2022-08-24 PROCEDURE — 96376 TX/PRO/DX INJ SAME DRUG ADON: CPT

## 2022-08-24 PROCEDURE — 250N000013 HC RX MED GY IP 250 OP 250 PS 637: Performed by: STUDENT IN AN ORGANIZED HEALTH CARE EDUCATION/TRAINING PROGRAM

## 2022-08-24 PROCEDURE — 43235 EGD DIAGNOSTIC BRUSH WASH: CPT | Performed by: INTERNAL MEDICINE

## 2022-08-24 PROCEDURE — 96361 HYDRATE IV INFUSION ADD-ON: CPT

## 2022-08-24 PROCEDURE — 76770 US EXAM ABDO BACK WALL COMP: CPT

## 2022-08-24 PROCEDURE — 258N000003 HC RX IP 258 OP 636: Performed by: STUDENT IN AN ORGANIZED HEALTH CARE EDUCATION/TRAINING PROGRAM

## 2022-08-24 RX ORDER — LIDOCAINE 40 MG/G
CREAM TOPICAL
Status: DISCONTINUED | OUTPATIENT
Start: 2022-08-24 | End: 2022-08-25 | Stop reason: HOSPADM

## 2022-08-24 RX ORDER — DIPHENHYDRAMINE HCL 25 MG
25 CAPSULE ORAL ONCE
Status: COMPLETED | OUTPATIENT
Start: 2022-08-24 | End: 2022-08-24

## 2022-08-24 RX ORDER — CETIRIZINE HYDROCHLORIDE 10 MG/1
10 TABLET ORAL EVERY EVENING
Status: DISCONTINUED | OUTPATIENT
Start: 2022-08-24 | End: 2022-08-25 | Stop reason: HOSPADM

## 2022-08-24 RX ORDER — BUSPIRONE HYDROCHLORIDE 10 MG/1
20 TABLET ORAL 3 TIMES DAILY
Status: DISCONTINUED | OUTPATIENT
Start: 2022-08-24 | End: 2022-08-25 | Stop reason: HOSPADM

## 2022-08-24 RX ORDER — HYDROCHLOROTHIAZIDE 25 MG/1
25 TABLET ORAL
Status: DISCONTINUED | OUTPATIENT
Start: 2022-08-24 | End: 2022-08-25 | Stop reason: HOSPADM

## 2022-08-24 RX ORDER — MONTELUKAST SODIUM 10 MG/1
10 TABLET ORAL EVERY EVENING
Status: DISCONTINUED | OUTPATIENT
Start: 2022-08-24 | End: 2022-08-25 | Stop reason: HOSPADM

## 2022-08-24 RX ORDER — FENTANYL CITRATE 50 UG/ML
INJECTION, SOLUTION INTRAMUSCULAR; INTRAVENOUS PRN
Status: COMPLETED | OUTPATIENT
Start: 2022-08-24 | End: 2022-08-24

## 2022-08-24 RX ORDER — DESVENLAFAXINE 100 MG/1
100 TABLET, EXTENDED RELEASE ORAL DAILY
Status: DISCONTINUED | OUTPATIENT
Start: 2022-08-24 | End: 2022-08-25 | Stop reason: HOSPADM

## 2022-08-24 RX ORDER — ALBUTEROL SULFATE 0.83 MG/ML
2.5 SOLUTION RESPIRATORY (INHALATION) EVERY 6 HOURS PRN
COMMUNITY
End: 2023-11-22

## 2022-08-24 RX ORDER — HYDROXYZINE HYDROCHLORIDE 25 MG/1
25 TABLET, FILM COATED ORAL EVERY 6 HOURS PRN
Status: DISCONTINUED | OUTPATIENT
Start: 2022-08-24 | End: 2022-08-25 | Stop reason: HOSPADM

## 2022-08-24 RX ORDER — HYDROMORPHONE HYDROCHLORIDE 1 MG/ML
0.5 INJECTION, SOLUTION INTRAMUSCULAR; INTRAVENOUS; SUBCUTANEOUS EVERY 4 HOURS PRN
Status: DISCONTINUED | OUTPATIENT
Start: 2022-08-24 | End: 2022-08-25 | Stop reason: HOSPADM

## 2022-08-24 RX ORDER — ALBUTEROL SULFATE 90 UG/1
2 AEROSOL, METERED RESPIRATORY (INHALATION) EVERY 6 HOURS PRN
Status: DISCONTINUED | OUTPATIENT
Start: 2022-08-24 | End: 2022-08-25 | Stop reason: HOSPADM

## 2022-08-24 RX ORDER — METFORMIN HCL 500 MG
1000 TABLET, EXTENDED RELEASE 24 HR ORAL
Status: DISCONTINUED | OUTPATIENT
Start: 2022-08-24 | End: 2022-08-25 | Stop reason: HOSPADM

## 2022-08-24 RX ORDER — HYDROXYCHLOROQUINE SULFATE 200 MG/1
200 TABLET, FILM COATED ORAL 2 TIMES DAILY
Status: DISCONTINUED | OUTPATIENT
Start: 2022-08-24 | End: 2022-08-25 | Stop reason: HOSPADM

## 2022-08-24 RX ORDER — MINERAL OIL/HYDROPHIL PETROLAT
OINTMENT (GRAM) TOPICAL EVERY 4 HOURS PRN
Status: DISCONTINUED | OUTPATIENT
Start: 2022-08-24 | End: 2022-08-25 | Stop reason: HOSPADM

## 2022-08-24 RX ORDER — PREGABALIN 100 MG/1
100 CAPSULE ORAL 3 TIMES DAILY
Status: DISCONTINUED | OUTPATIENT
Start: 2022-08-24 | End: 2022-08-25 | Stop reason: HOSPADM

## 2022-08-24 RX ORDER — DIPHENHYDRAMINE HCL 25 MG
25 CAPSULE ORAL EVERY 6 HOURS PRN
Status: DISCONTINUED | OUTPATIENT
Start: 2022-08-24 | End: 2022-08-25 | Stop reason: HOSPADM

## 2022-08-24 RX ORDER — DIPHENHYDRAMINE HYDROCHLORIDE 50 MG/ML
INJECTION INTRAMUSCULAR; INTRAVENOUS PRN
Status: COMPLETED | OUTPATIENT
Start: 2022-08-24 | End: 2022-08-24

## 2022-08-24 RX ORDER — FERROUS SULFATE 325(65) MG
325 TABLET ORAL DAILY
Status: DISCONTINUED | OUTPATIENT
Start: 2022-08-25 | End: 2022-08-25 | Stop reason: HOSPADM

## 2022-08-24 RX ORDER — VITAMIN B COMPLEX
50 TABLET ORAL DAILY
Status: DISCONTINUED | OUTPATIENT
Start: 2022-08-25 | End: 2022-08-25 | Stop reason: HOSPADM

## 2022-08-24 RX ORDER — ACETAMINOPHEN 325 MG/1
650 TABLET ORAL EVERY 6 HOURS PRN
Status: DISCONTINUED | OUTPATIENT
Start: 2022-08-24 | End: 2022-08-25 | Stop reason: HOSPADM

## 2022-08-24 RX ORDER — LURASIDONE HYDROCHLORIDE 40 MG/1
40 TABLET, FILM COATED ORAL
Status: DISCONTINUED | OUTPATIENT
Start: 2022-08-24 | End: 2022-08-25 | Stop reason: HOSPADM

## 2022-08-24 RX ORDER — METFORMIN HCL 500 MG
1000 TABLET, EXTENDED RELEASE 24 HR ORAL
COMMUNITY
End: 2024-03-28

## 2022-08-24 RX ORDER — HYDROMORPHONE HYDROCHLORIDE 1 MG/ML
0.3 INJECTION, SOLUTION INTRAMUSCULAR; INTRAVENOUS; SUBCUTANEOUS ONCE
Status: COMPLETED | OUTPATIENT
Start: 2022-08-24 | End: 2022-08-24

## 2022-08-24 RX ADMIN — DIPHENHYDRAMINE HYDROCHLORIDE 50 MG: 50 CAPSULE ORAL at 00:29

## 2022-08-24 RX ADMIN — HYDROMORPHONE HYDROCHLORIDE 0.5 MG: 1 INJECTION, SOLUTION INTRAMUSCULAR; INTRAVENOUS; SUBCUTANEOUS at 15:19

## 2022-08-24 RX ADMIN — PREGABALIN 100 MG: 100 CAPSULE ORAL at 20:04

## 2022-08-24 RX ADMIN — CETIRIZINE HYDROCHLORIDE 10 MG: 10 TABLET, FILM COATED ORAL at 20:05

## 2022-08-24 RX ADMIN — FENTANYL CITRATE 100 MCG: 50 INJECTION, SOLUTION INTRAMUSCULAR; INTRAVENOUS at 10:21

## 2022-08-24 RX ADMIN — ONDANSETRON 4 MG: 2 INJECTION INTRAMUSCULAR; INTRAVENOUS at 05:25

## 2022-08-24 RX ADMIN — LURASIDONE HYDROCHLORIDE 40 MG: 40 TABLET, FILM COATED ORAL at 20:10

## 2022-08-24 RX ADMIN — FENTANYL CITRATE 50 MCG: 50 INJECTION, SOLUTION INTRAMUSCULAR; INTRAVENOUS at 10:23

## 2022-08-24 RX ADMIN — HYDROXYCHLOROQUINE SULFATE 200 MG: 200 TABLET, FILM COATED ORAL at 20:05

## 2022-08-24 RX ADMIN — HYDROMORPHONE HYDROCHLORIDE 0.5 MG: 1 INJECTION, SOLUTION INTRAMUSCULAR; INTRAVENOUS; SUBCUTANEOUS at 03:16

## 2022-08-24 RX ADMIN — MIDAZOLAM 1 MG: 1 INJECTION INTRAMUSCULAR; INTRAVENOUS at 10:23

## 2022-08-24 RX ADMIN — BUSPIRONE HYDROCHLORIDE 20 MG: 10 TABLET ORAL at 20:05

## 2022-08-24 RX ADMIN — SODIUM CHLORIDE: 9 INJECTION, SOLUTION INTRAVENOUS at 02:34

## 2022-08-24 RX ADMIN — HYDROMORPHONE HYDROCHLORIDE 0.5 MG: 1 INJECTION, SOLUTION INTRAMUSCULAR; INTRAVENOUS; SUBCUTANEOUS at 01:11

## 2022-08-24 RX ADMIN — MIDAZOLAM 2 MG: 1 INJECTION INTRAMUSCULAR; INTRAVENOUS at 10:21

## 2022-08-24 RX ADMIN — MONTELUKAST 10 MG: 10 TABLET, FILM COATED ORAL at 20:05

## 2022-08-24 RX ADMIN — ACETAMINOPHEN 650 MG: 325 TABLET ORAL at 15:19

## 2022-08-24 RX ADMIN — METFORMIN ER 500 MG 1000 MG: 500 TABLET ORAL at 17:08

## 2022-08-24 RX ADMIN — DIPHENHYDRAMINE HYDROCHLORIDE 50 MG: 50 INJECTION, SOLUTION INTRAMUSCULAR; INTRAVENOUS at 10:15

## 2022-08-24 RX ADMIN — HYDROMORPHONE HYDROCHLORIDE 0.5 MG: 1 INJECTION, SOLUTION INTRAMUSCULAR; INTRAVENOUS; SUBCUTANEOUS at 11:15

## 2022-08-24 RX ADMIN — HYDROMORPHONE HYDROCHLORIDE 0.3 MG: 1 INJECTION, SOLUTION INTRAMUSCULAR; INTRAVENOUS; SUBCUTANEOUS at 05:20

## 2022-08-24 RX ADMIN — HYDROMORPHONE HYDROCHLORIDE 0.4 MG: 1 INJECTION, SOLUTION INTRAMUSCULAR; INTRAVENOUS; SUBCUTANEOUS at 09:44

## 2022-08-24 RX ADMIN — HYDROMORPHONE HYDROCHLORIDE 0.5 MG: 1 INJECTION, SOLUTION INTRAMUSCULAR; INTRAVENOUS; SUBCUTANEOUS at 20:02

## 2022-08-24 RX ADMIN — DIPHENHYDRAMINE HYDROCHLORIDE 25 MG: 25 CAPSULE ORAL at 11:34

## 2022-08-24 RX ADMIN — ACETAMINOPHEN 650 MG: 325 TABLET ORAL at 20:01

## 2022-08-24 ASSESSMENT — ACTIVITIES OF DAILY LIVING (ADL)
ADLS_ACUITY_SCORE: 27
EQUIPMENT_CURRENTLY_USED_AT_HOME: WALKER, ROLLING
ADLS_ACUITY_SCORE: 27
ADLS_ACUITY_SCORE: 40
DRESSING/BATHING_DIFFICULTY: NO
ADLS_ACUITY_SCORE: 40
DIFFICULTY_EATING/SWALLOWING: NO
WALKING_OR_CLIMBING_STAIRS_DIFFICULTY: NO
ADLS_ACUITY_SCORE: 27
FALL_HISTORY_WITHIN_LAST_SIX_MONTHS: NO
ADLS_ACUITY_SCORE: 40
CONCENTRATING,_REMEMBERING_OR_MAKING_DECISIONS_DIFFICULTY: NO
TOILETING_ISSUES: NO
ADLS_ACUITY_SCORE: 40
CHANGE_IN_FUNCTIONAL_STATUS_SINCE_ONSET_OF_CURRENT_ILLNESS/INJURY: NO
WEAR_GLASSES_OR_BLIND: YES
ADLS_ACUITY_SCORE: 40
VISION_MANAGEMENT: GLASSES
ADLS_ACUITY_SCORE: 27
ADLS_ACUITY_SCORE: 40
ADLS_ACUITY_SCORE: 27
DOING_ERRANDS_INDEPENDENTLY_DIFFICULTY: YES
ADLS_ACUITY_SCORE: 27
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
## 2022-08-24 NOTE — ED TRIAGE NOTES
"Pt arrives via EMS f/ group home after swallowing the wire out of her mask. States she felt anxious which led her to do this. Had a psych appointment today in which she talked about having \"background\" suicidal thoughts. States she currently has thoughts but no plan. States she has them on and off and doesn't know when they're coming. Denies homicidal thoughts. Feels scared and that she can't help herself. Denies drug or alcohol use today. A&O, VSS.       "

## 2022-08-24 NOTE — PROGRESS NOTES
Brief progress note    Saw patient prior to EGD.  Doing well overall, looking forward to getting object out.  No chest pain or shortness of breath. Mild throat pain after EGD at [facility].  No fever or chills.  Discussed with GI after EGD and object removed successfully.  Called Aaliyah (guardian) with update and she discussed how this has happened multiple times and is getting harder for her to get control of objects in her room in order to prevent intentional swallowing.  Discussed psychiatry consult with Aaliyah and she agrees that patient needs further psych care to hopefully improve behavior and decrease further foreign body swallowing risk. Will place psych consult and monitor overnight.

## 2022-08-24 NOTE — UTILIZATION REVIEW
"Admission Status; Secondary Review Determination     Under the authority of the Utilization Management Committee, the utilization review process indicated a secondary review on the above patient. The review outcome is based on review of the medical records, discussions with staff, and applying clinical experience noted on the date of the review.     (x) Observation Status Appropriate - This patient does not meet hospital inpatient criteria and is placed in observation status. If this patient's primary payer is Medicare and was admitted as an inpatient, Condition Code 44 should be used and patient status changed to \"observation\".     RATIONALE FOR DETERMINATION:  31 year old female admitted on 8/23/2022. She has a history of complex psych history (borderline personality disorder, MDD, PTSD), frequent foreign body ingestions complicated by esophageal perforation in 2019, and GERD and was admitted for foreign body ingestion (wire from mask)    Vital signs stable    Labs unremarkable    CXR shows \"Radiopaque wire is partially seen projecting over the upper abdomen. No acute airspace disease.\"    GI was consulted.  EGD performed and foreign body removed    Psychiatry has been consulted    The severity of illness, intensity of service provided, expected LOS and risk for adverse outcome make the care appropriate for further observation; however, doesn't meet criteria for hospital inpatient admission. Dr Ann notified of this determination via text message through [employer].     The information on this document is developed by the utilization review team in order for the business office to ensure compliance. This only denotes the appropriateness of proper admission status and does not reflect the quality of care rendered.   The definitions of Inpatient Status and Observation Status used in making the determination above are those provided in the CMS Coverage Manual, Chapter 1 and Chapter 6, section 70.4.     Sincerely, "     Yuniel Anton MD  Utilization Review   Physician Advisor   [facility]

## 2022-08-24 NOTE — ED PROVIDER NOTES
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 oriented   Psychiatric:         Mood and Affect: Mood normal.         Behavior: Behavior normal.         ED Course      Procedures              Results for orders placed or performed during the hospital encounter of 08/23/22   XR Chest 2 Views     Status: None    Narrative    XR CHEST 2 VIEWS  8/23/2022 8:53 PM      HISTORY: Swallowed foreign body, wire from mask    COMPARISON: 8/22/2022    FINDINGS: PA and lateral views of the " chest. The cardiac silhouette is  within normal limits. No acute airspace opacity, pleural effusion, or  pneumothorax. Radiopaque wire is partially seen over the upper abdomen  on the lateral view. Cholecystectomy clips      Impression    IMPRESSION: Radiopaque wire is partially seen projecting over the  upper abdomen. No acute airspace disease.    I have personally reviewed the examination and initial interpretation  and I agree with the findings.    PATRICK FAM DO         SYSTEM ID:  [id number]   XR Abdomen 2 Views     Status: None    Narrative    XR ABDOMEN 2 VIEWS  8/23/2022 8:52 PM      HISTORY: Swallowed foreign body, wire from mask    COMPARISON: 8/22/2022    FINDINGS: AP view of the abdomen. Single radiopaque wire with small  hook on the end projects over the left upper quadrant. No definite  free air or pneumatosis. Moderate colonic stool. Cholecystectomy  clips.      Impression    IMPRESSION: Single radiopaque wire with small hook on the end projects  over the left upper quadrant, likely within the stomach.     I have personally reviewed the examination and initial interpretation  and I agree with the findings.    PATRICK FAM DO         SYSTEM ID:  [id number]   [place] Draw     Status: None    Narrative    The following orders were created for panel order [place] Draw.  Procedure                               Abnormality         Status                     ---------                               -----------         ------                     Extra Blue Top Tube[[id number]]                              Final result               Extra Red Top Tube[[id number]]                               Final result               Extra Green Top (Lithium...[[id number]]                      Final result                 Please view results for these tests on the individual orders.   CBC with platelets and differential     Status: None   Result Value Ref Range    WBC Count 8.9 4.0 - 11.0 10e3/uL    RBC Count 4.50 3.80 - 5.20  10e6/uL    Hemoglobin 13.3 11.7 - 15.7 g/dL    Hematocrit 40.5 35.0 - 47.0 %    MCV 90 78 - 100 fL    MCH 29.6 26.5 - 33.0 pg    MCHC 32.8 31.5 - 36.5 g/dL    RDW 13.2 10.0 - 15.0 %    Platelet Count 277 150 - 450 10e3/uL    % Neutrophils 68 %    % Lymphocytes 21 %    % Monocytes 8 %    % Eosinophils 2 %    % Basophils 0 %    % Immature Granulocytes 1 %    NRBCs per 100 WBC 0 <1 /100    Absolute Neutrophils 6.1 1.6 - 8.3 10e3/uL    Absolute Lymphocytes 1.9 0.8 - 5.3 10e3/uL    Absolute Monocytes 0.7 0.0 - 1.3 10e3/uL    Absolute Eosinophils 0.2 0.0 - 0.7 10e3/uL    Absolute Basophils 0.0 0.0 - 0.2 10e3/uL    Absolute Immature Granulocytes 0.1 <=0.4 10e3/uL    Absolute NRBCs 0.0 10e3/uL   Extra Blue Top Tube     Status: None   Result Value Ref Range    Hold Specimen JIC    Extra Red Top Tube     Status: None   Result Value Ref Range    Hold Specimen JIC    Extra Green Top (Lithium Heparin) Tube     Status: None   Result Value Ref Range    Hold Specimen JIC    CBC with platelets differential     Status: None    Narrative    The following orders were created for panel order CBC with platelets differential.  Procedure                               Abnormality         Status                     ---------                               -----------         ------                     CBC with platelets and d...[[id number]]                      Final result                 Please view results for these tests on the individual orders.     Medications   0.9% sodium chloride BOLUS (1,000 mLs Intravenous New Bag 8/23/22 2120)     Followed by   sodium chloride 0.9% infusion (has no administration in time range)   ondansetron (ZOFRAN) injection 4 mg (4 mg Intravenous Given 8/23/22 2120)   HYDROmorphone (PF) (DILAUDID) injection 0.5 mg (0.5 mg Intravenous Given 8/23/22 2257)   diphenhydrAMINE (BENADRYL) capsule 50 mg (has no administration in time range)   HYDROmorphone (PF) (DILAUDID) injection 0.5 mg (0.5 mg Intravenous Given  8/23/22 2121)        Assessments & Plan (with Medical Decision Making)   31-year-old female presents to us with a chief complaint of ingestion of foreign body.  She denies any current active suicidal ideation.  She reports she normally has a degree of passive suicidal thoughts.  Previous records were reviewed.  Labs were interpreted and were unremarkable.  X-rays of the chest and abdomen were ordered.  These were interpreted and show evidence of the stated metal wire in her stomach.  Discussed with on-call gastroenterology.  They recommend admission and scope her in the a.m.  Patient will be admitted to the internal medicine service  I have reviewed the nursing notes. I have reviewed the findings, diagnosis, plan and need for follow up with the patient.    New Prescriptions    No medications on file       Final diagnoses:   Swallowed foreign body, initial encounter     I, Josselin Hill, am serving as a trained medical scribe to document services personally performed by Efraín Diego DO based on the provider's statements to me on August 23, 2022.  This document has been checked and approved by the attending provider.    I, Efraín Diego DO, was physically present and have reviewed and verified the accuracy of this note documented by Josselin Hill, medical scribe.      Efraín Diego DO     [facility] EMERGENCY DEPARTMENT  8/23/2022     Efraín Diego DO  08/23/22 [id number]

## 2022-08-24 NOTE — CARE PLAN
Admitted/transferred from: Endo  2 RN skin assessment completed by Grzegorz Diop, CARMEN and Laura PAT  Skin assessment finding: skin intact  Interventions/actions: pt educated on frequent repositioning, cream ordered for itching     Will continue to monitor.  
normal...

## 2022-08-24 NOTE — PROGRESS NOTES
Brief GI note:     Note: patient not seen today, this note is a product of chart review and discussion with ED physician.     Called regarding patient presenting after swallowing a wire from a medical facemask around 7pm tonight. Patient has history of frequent foreign body ingestions complicated by esophageal perforation in 2019. Most recent EGD was 8/16 after patient swallowed wire binding from notebook; procedure note commented on difficult removal with injury to posterior oropharynx with removal.     Reported swallowing mask wire due to anxiety today. Noting abdominal pain to ED provider. Vitals: afebrile , /71, oxygenating well on room air. ED provider reports abdominal soft without peritoneal signs    Abdominal X-ray shows wire in stomach, no evidence of free air.   Patient will require EGD within 24 hours for removal of foreign body. Given length of object, it is unlikely to leave the stomach therefor will plan for EGD in AM.      Recommendations:   - admit to OBS vs medicine per ED provider   - keep NPO  - IVF for hydration   - Plan for EGD for foreign body removal in AM    Patient discussed with on call GI staff, Dr. Rogers.     Jacob Crews MD  GI fellow

## 2022-08-24 NOTE — PHARMACY-ADMISSION MEDICATION HISTORY
Admission Medication History Completed by Pharmacy    See [facility] Admission Navigator for allergy information, preferred outpatient pharmacy, prior to admission medications and immunization status.     Medication History Sources:     Patient interview    Surescript fill history    Care Everywhere chart review    Changes made to PTA medication list (reason):    Added:   o Rexulti     Deleted: None    Changed:  o Updated directions for cetirizine to take at bedtime (per pt)  o Updated directions for Pristiq to take in morning (per pt)  o Updated directions for Latuda to be taking at 8 pm (per pt)  o Updated montelukast directions to take in evening (per pt)    Additional Information:    Pt saw her psychiatry provider yesterday (at [facility]) and she was told to make the following changes.  o Start brexiprazole (Rexulti) 0.5 mg daily for 1 week, and lower Latuda to 20 mg daily. After 1 week, increase Rexulti to 1 mg daily and stop Latuda.  o Per pt, this was just prescribed yesterday and she has not started this yet. Currently, she reports still taking Latuda 40 mg every evening (at 8 pm).     Prior to Admission medications    Medication Sig Last Dose Taking? Auth Provider Long Term End Date   albuterol (PROAIR HFA/PROVENTIL HFA/VENTOLIN HFA) 108 (90 Base) MCG/ACT inhaler Inhale 2 puffs into the lungs every 6 hours as needed for shortness of breath / dyspnea or wheezing  Yes Nish Saucedo MD Yes    albuterol (PROVENTIL) (2.5 MG/3ML) 0.083% neb solution Take 2.5 mg by nebulization every 6 hours as needed for shortness of breath / dyspnea or wheezing  Yes Unknown, Entered By History Yes    alum & mag hydroxide-simethicone (MAALOX MAX) 400-400-40 MG/5ML SUSP suspension Take 15 mLs by mouth every 6 hours as needed for heartburn or other (sore throat)  Yes Inessa Barlow MD     brexpiprazole (REXULTI) 0.5 MG tablet Take 0.5 mg by mouth daily for 1 week, and reduce Latuda to 20 mg daily for 1 week. After 1 week,  increase Rexulti to 1 mg by mouth daily and stop Latuda.  at Not started yet Yes Unknown, Entered By History     busPIRone (BUSPAR) 10 MG tablet Take 2 tablets (20 mg) by mouth 3 times daily  Yes Marian Ledesma MD Yes    cetirizine (ZYRTEC) 10 MG tablet Take 1 tablet (10 mg) by mouth daily  Patient taking differently: Take 10 mg by mouth At Bedtime  Yes Marian Ledesma MD     Cholecalciferol (VITAMIN D) 50 MCG (2000 UT) CAPS Take 2,000 Units by mouth daily  Yes Marian Ledesma MD No    desvenlafaxine (PRISTIQ) 100 MG 24 hr tablet Take 1 tablet (100 mg) by mouth daily  Patient taking differently: Take 100 mg by mouth every morning  Yes Marian Ledesma MD Yes    diphenhydrAMINE (BENADRYL) 25 MG tablet Take 25 mg by mouth every 6 hours as needed  Yes Reported, Patient     ferrous sulfate (FEROSUL) 325 (65 Fe) MG tablet Take 1 tablet (325 mg) by mouth daily (with breakfast)  Yes Marian Ledesma MD     hydrochlorothiazide (HYDRODIURIL) 25 MG tablet Take 25 mg by mouth daily before breakfast  Yes Reported, Patient Yes    hydroxychloroquine (PLAQUENIL) 200 MG tablet Take 1 tablet (200 mg) by mouth 2 times daily  Yes Marian Ledesma MD     hydrOXYzine (ATARAX) 25 MG tablet Take 25 mg by mouth every 6 hours as needed  Yes Reported, Patient     ibuprofen (ADVIL/MOTRIN) 600 MG tablet Take 600 mg by mouth every 6 hours as needed  Yes Reported, Patient No 1/13/23   lidocaine, viscous, (XYLOCAINE) 2 % solution Swish and swallow 15 mLs in mouth every 6 hours as needed for pain ; Max 8 doses/24 hour period.  Yes Inessa Barlow MD     metFORMIN (GLUCOPHAGE XR) 500 MG 24 hr tablet Take 1,000 mg by mouth daily (with dinner) Take at 5 pm.  Yes Unknown, Entered By History Yes    montelukast (SINGULAIR) 10 MG tablet Take 10 mg by mouth every evening  Yes Reported, Patient Yes    ondansetron (ZOFRAN-ODT) 4 MG ODT tab Take 1 tablet (4 mg) by mouth every 8 hours as needed for nausea  Yes Marian Ledesma MD No    pregabalin (LYRICA) 100 MG  capsule Take 1 capsule (100 mg) by mouth 3 times daily  Yes Marian Ledesma MD Yes    SUMAtriptan (IMITREX) 25 MG tablet Take 25 mg by mouth as needed  Yes Reported, Patient     valACYclovir (VALTREX) 1000 mg tablet Take 2 tablets by mouth two times daily for one day. Use as needed at onset of cold sore.   Yes Unknown, Entered By History     lurasidone (LATUDA) 40 MG TABS tablet Take 1 tablet (40 mg) by mouth daily (with dinner)  Patient taking differently: Take 40 mg by mouth every evening At 8 pm.   Marian Ledesma MD Yes    Respiratory Therapy Supplies (NEBULIZER) BRENDAN Nebulizer device.  Albuterol nebulization every 2 hours as needed for shortness of breath or wheezing.   Nish Saucedo MD         Date completed: 08/24/22    Medication history completed by: [facility]

## 2022-08-24 NOTE — H&P
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  Self No No   Sig: Take 1 tablet (325 mg) by mouth daily (with breakfast)   hydrOXYzine (ATARAX) 25 MG tablet  Self Yes No   Sig: Take 25 mg by mouth every 6 hours as needed   hydrochlorothiazide (HYDRODIURIL) 25 MG tablet  Self Yes No   Sig: Take 25 mg by mouth daily before breakfast   hydroxychloroquine (PLAQUENIL) 200 MG tablet  Self  No No   Sig: Take 1 tablet (200 mg) by mouth 2 times daily   ibuprofen (ADVIL/MOTRIN) 600 MG tablet  Self Yes No   Sig: Take 600 mg by mouth every 6 hours as needed   lidocaine, viscous, (XYLOCAINE) 2 % solution   No No   Sig: Swish and swallow 15 mLs in mouth every 6 hours as needed for pain ; Max 8 doses/24 hour period.   lurasidone (LATUDA) 40 MG TABS tablet  Self No No   Sig: Take 1 tablet (40 mg) by mouth daily (with dinner)   metFORMIN (FORTAMET) 1000 MG 24 hr tablet  Self No No   Sig: Take 1 tablet (1,000 mg) by mouth daily (with dinner)   montelukast (SINGULAIR) 10 MG tablet  Self Yes No   Sig: Take 10 mg by mouth daily   ondansetron (ZOFRAN-ODT) 4 MG ODT tab  Self No No   Sig: Take 1 tablet (4 mg) by mouth every 8 hours as needed for nausea   pregabalin (LYRICA) 100 MG capsule  Self No No   Sig: Take 1 capsule (100 mg) by mouth 3 times daily   valACYclovir (VALTREX) 1000 mg tablet  Self Yes No   Sig: Take 2 tablets by mouth two times daily for one day. Use as needed at onset of cold sore.       Facility-Administered Medications: None     Allergies   Allergies   Allergen Reactions     Amoxicillin-Pot Clavulanate Other (See Comments), Swelling and Rash     PN: facial swelling, left side. Also had cortisone injection the same day as she started the Augmentin.  Noted in downtime recovery of chart.    PN: facial swelling, left side. Also had cortisone injection the same day as she started the Augmentin.; HUT Comment: PN: facial swelling, left side. Also had cortisone injection the same day as she started the Augmentin.Noted in downtime recovery of chart.; HUT Reaction: Rash; HUT Reaction: Unknown; HUT Reaction Type: Allergy; HUT Severity: Med; HUT Noted: [id number]     Hydrocodone-Acetaminophen Nausea and Vomiting and Rash     Update on 12/12  Pt says she can take tylenol just not the hydrocodone.   Other reaction(s): Rash       Latex Rash     HUT Reaction: Rash; HUT Reaction Type: Allergy; HUT Severity: Low; HUT  Noted: [id number]  Other reaction(s): Rash       Oseltamivir Hives     med stopped, PN: med stopped  med stopped, PN: med stopped; HUT Comment: med stopped, PN: med stopped; HUT Reaction: Hives; HUT Reaction Type: Allergy; HUT Severity: Med; HUT Noted: [id number]     Penicillins Anaphylaxis     HUT Reaction: Anaphylaxis; HUT Reaction Type: Allergy; HUT Severity: High; HUT Noted: [id number]     Vancomycin Itching, Swelling and Rash     Other reaction(s): Redness  Flushed face, very itchy; HUT Comment: Flushed face, very itchy; HUT Reaction: Angioedema; HUT Reaction: Redness; HUT Severity: Med; HUT Noted: [id number]    facial     Hydrocodone Nausea and Vomiting and GI Disturbance     vomiting for days, PN: vomiting for days; HUT Comment: vomiting for days; HUT Reaction: Gastrointestinal; HUT Reaction: Nausea And Vomiting; HUT Reaction Type: Intolerance; HUT Severity: Med; HUT Noted: [id number]  vomiting for days       Blood-Group Specific Substance Other (See Comments)     Patient has an anti-Cw and non-specific antibodies. Blood product orders may be delayed. Draw one red top and two purple top tubes for all type/screen/crossmatch orders.  Patient has anti-Cw, anti-K ([place]), Warm auto and nonspecific antibodies. Blood products may be delayed. Draw patient 24 hours prior to transfusion. Draw one red top and two purple top tubes for all type and screen orders.     Clavulanic Acid Angioedema     Fentanyl Itching     Naltrexone Other (See Comments)     Reaction(s): Vivid dreams.     Other Drug Allergy (See Comments)      See original file MRN [id number]. Files are marked for merge     Oxycodone Swelling     Adhesive Tape Rash     Silicone type     Band-Aid Anti-Itch      Other reaction(s): adhesive allergy     Cephalosporins Rash     Lamotrigine Rash     Possibly associated with Lamictal.   HUT Comment: Possibly associated with Lamictal. ; HUT Reaction: Rash; HUT Reaction Type: Allergy; HUT Severity: Low; HUT Noted: [id number]        Physical Exam   Vital Signs: Temp: 98.3  F (36.8  C) Temp src: Oral BP: (!) 152/87 Pulse: 101   Resp: 16 SpO2: 94 % O2 Device: None (Room air)    Weight: 295 lbs 0 oz    General Appearance: well appearing, NAD lying in bed  Eyes: Anicteric sclera  HEENT: NCAT, anicteric sclera, MMM  Respiratory: Non-labored on room air, LCTAB  Cardiovascular: RRR, no murmurs  GI: Soft, non-distended, obese, tender to palpation in epigastric region without rebound or guarding  Skin: No rash on exposed skin including pruritic area  Extremities: WWP, no peripheral edema  Neurologic: AAOx4, grossly non-focal  Psychiatric: Calm and cooperative    Data   Data reviewed today: I reviewed all medications, new labs and imaging results over the last 24 hours. I personally reviewed all    Recent Labs   Lab 08/23/22 2018 08/22/22  1815   WBC 8.9 12.2*   HGB 13.3 13.6   MCV 90 90    296   INR 1.00  --    NA  --  137   POTASSIUM  --  4.0   CHLORIDE  --  97*   CO2  --  28   BUN  --  14.9   CR  --  0.67   ANIONGAP  --  12   KELBY  --  10.5*   GLC  --  115*     Recent Results (from the past 24 hour(s))   XR Abdomen 2 Views    Narrative    XR ABDOMEN 2 VIEWS  8/23/2022 8:52 PM      HISTORY: Swallowed foreign body, wire from mask    COMPARISON: 8/22/2022    FINDINGS: AP view of the abdomen. Single radiopaque wire with small  hook on the end projects over the left upper quadrant. No definite  free air or pneumatosis. Moderate colonic stool. Cholecystectomy  clips.      Impression    IMPRESSION: Single radiopaque wire with small hook on the end projects  over the left upper quadrant, likely within the stomach.     I have personally reviewed the examination and initial interpretation  and I agree with the findings.    PATRICK FAM DO         SYSTEM ID:  [id number]   XR Chest 2 Views    Narrative    XR CHEST 2 VIEWS  8/23/2022 8:53 PM      HISTORY: Swallowed foreign body, wire from mask    COMPARISON: 8/22/2022    FINDINGS: PA and lateral  views of the chest. The cardiac silhouette is  within normal limits. No acute airspace opacity, pleural effusion, or  pneumothorax. Radiopaque wire is partially seen over the upper abdomen  on the lateral view. Cholecystectomy clips      Impression    IMPRESSION: Radiopaque wire is partially seen projecting over the  upper abdomen. No acute airspace disease.    I have personally reviewed the examination and initial interpretation  and I agree with the findings.    PATRICK FAM DO         SYSTEM ID:  [id number]

## 2022-08-24 NOTE — PROVIDER NOTIFICATION
Cody Davis paged  Regarding pt concerns about not receiving any of her home medications since checking in to the hospital. She has Latuda scheduled for 5Pm, but usually takes it at 8pm, per pt.

## 2022-08-24 NOTE — OR NURSING
Pt tolerated EGD with removal of foreign body wel under conscious sedation. Pt is being admitted to PCU 7D. Report called to floor nurse. Pt was c/o itching soon after the scope was removed. Reminded pt that she did receive benadryl . Pt is currently sleeping . Will also send pt to the floor on  O2 lpm per NC

## 2022-08-24 NOTE — PROVIDER NOTIFICATION
Purpose of Notification: due to void  Notified Person: MD on call  Notification Time: 1700  Notification Interaction: page  7B [id number] [address]  Pt has been unable to void since procedure, bladder scanner shows low readings upon multiple tries with multiple people. Do not think its accurate I think she needs a straight cath  Binta RN [id number]    After discussion with Gold Attending, order was placed for straight cath x1 d/t mysteriousness of low bladder scan readings when pt has had high oral intake and 125 mL/hr IV fluid and >12 hours w/o urine output. Straight cath resulted: 75 mL yellow urine.   Cross cover paged to notify.    Cross cover ordering renal panel and renal US, holding benedr

## 2022-08-24 NOTE — PROVIDER NOTIFICATION
Purpose of Notification: medication  Notified Person: medicine  Notification Time: 1231  Notification Interaction: page  7b [id number] [address]  Pt took 50 mg oral Benadryl 1 hour ago, says its not working she its scratching all over. Pt requesting IV Benadryl  Binta RN [id number]  Provider ordered Aquaphor for irritation   Discussed with pt, she is agreeable to trying

## 2022-08-24 NOTE — CONSULTS
GASTROENTEROLOGY CONSULTATION      Luminal consult    Consult requested by Cody Davis MD    Reason for consult: Foreign body ingestion    History is obtained from the patient    Date of Admission:  8/23/2022         History of Present Illness:   Nevin Alvarado is a 31 year old female with a history of foreign body ingestion, insertion in rectum/vagina and multiple prior EGD for retrieval with esophageal perforation in the past.    She comes in after swallowing a mask wire yesterday.  She has had multiple EGDs for this.  She reports doing this when her anxiety is high as a coping mechanism.      She has a therapist and psychiatrist whom she follows with.  She has had a prior esophageal perforation in the past.         Social History:   Has a guardian         Family History:   Denies relevant family history of gastrointestinal disease          Past Medical, Surgical and Procedural History (Summarized):   Pertinent Medical History:  Anxiety  Depression         Medications:     Current Facility-Administered Medications   Medication     acetaminophen (TYLENOL) tablet 650 mg     albuterol (PROVENTIL HFA/VENTOLIN HFA) inhaler     [Held by provider] busPIRone (BUSPAR) tablet 20 mg     [Held by provider] desvenlafaxine (PRISTIQ) 24 hr tablet 100 mg     diphenhydrAMINE (BENADRYL) capsule 25 mg     diphenhydrAMINE (BENADRYL) injection     fentaNYL (PF) (SUBLIMAZE) injection     [Held by provider] hydrochlorothiazide (HYDRODIURIL) tablet 25 mg     HYDROmorphone (PF) (DILAUDID) injection 0.5 mg     [Held by provider] hydroxychloroquine (PLAQUENIL) tablet 200 mg     [Held by provider] hydrOXYzine (ATARAX) tablet 25 mg     lidocaine (LMX4) cream     lidocaine 1 % 0.1-1 mL     lurasidone (LATUDA) tablet 40 mg     melatonin tablet 1 mg     midazolam (VERSED) injection     ondansetron (ZOFRAN) injection 4 mg     [Held by provider] pregabalin (LYRICA) capsule 100 mg     sodium chloride (PF) 0.9% PF flush 3 mL      "sodium chloride (PF) 0.9% PF flush 3 mL     sodium chloride 0.9% infusion              Previous Endoscopy:   Multiple EGDs for foreign body ingestion  - see procedure tab / care everywhere for details         Review of Systems:   A complete 10 point review of systems was obtained.  Please see the HPI for pertinent positives and negatives.  All other systems were reviewed and were found to be negative.          Physical Exam:   BP (!) 152/87 (BP Location: Right arm)   Pulse 101   Temp 98.3  F (36.8  C) (Oral)   Resp 16   Ht 1.575 m (5' 2\")   Wt 133.8 kg (295 lb)   LMP  (LMP Unknown)   SpO2 94%   BMI 53.96 kg/m    Wt:   Wt Readings from Last 2 Encounters:   08/23/22 133.8 kg (295 lb)   08/22/22 135.2 kg (298 lb)      Constitutional: Cooperative, pleasant, no apparent distress.  HEENT: No conjunctival icterus, moist mucus membranes.  CV: Normal rate.  No edema.  Respiratory: Unlabored breathing  Abdomen:    Non-distended   Non-tender  Skin: No jaundice, warm  Neuro: Alert, moves all extremities, no asterixis.  Psych: Normal affect, answers questions appropriately.           ASSESSMENT AND PLAN:   Nevin Alvarado is a 31 year old female with a history of foreign body ingestion, insertion in rectum/vagina and multiple prior EGD for retrieval with esophageal perforation in the past.    Comes in after swallowing long, blunt foreign body (mask wire).  Removed successfully with EGD today.      She can discharge from GI standpoint when OK with medicine and her guardian.  Agree with ongoing efforts from psychiatry and therapy to help with her psychiatric disease.    #1 Ingested foreign body    Recommendations:  -- s/p EGD with removal today  -- GI will sign off    The case was staffed with attending, Dr. Bradley.  Luminal will continue to follow.    Efrem Quintanilla MD  Gastroenterology Fellow, PGY-6    "

## 2022-08-24 NOTE — PLAN OF CARE
"/82 (BP Location: Right arm)   Pulse 101   Temp (P) 97.2  F (36.2  C) (Oral)   Resp 16   Ht 1.575 m (5' 2\")   Wt 133.8 kg (295 lb)   LMP  (LMP Unknown)   SpO2 97%   BMI 53.96 kg/m    Shift: 11a-7:30p  Reason for admission: foreign body ingestion, POD 0 EGD  Neuro: AOx4, frustrated and labile, denies SI, says swallowing mask wire was an impulse to try and control anxiety   Safety: SI precautions, 1:1 in room, room sweep complete   Behavior: seeks out staff, seeks out high risk medications  Pain: c/o sharp pain in abdomen treated with IV dilaudid  CMS: intact  Cardiac: WDL  Resp: O2>90 on 2 LPM  GI/: due to void, -BM  Skin/Wound: skin intact, pt c/o itchiness untreated with oral benedryl   Access: PIV infusing  Labs: reviewed  Activity: Up SBA w walker  Nutrition: regular  Changes this shift: EGD complete  Plan:  Continue to monitor                      "

## 2022-08-25 ENCOUNTER — NURSE TRIAGE (OUTPATIENT)
Dept: NURSING | Facility: CLINIC | Age: 32
End: 2022-08-25

## 2022-08-25 VITALS
DIASTOLIC BLOOD PRESSURE: 79 MMHG | OXYGEN SATURATION: 93 % | WEIGHT: 293 LBS | TEMPERATURE: 97.8 F | RESPIRATION RATE: 18 BRPM | BODY MASS INDEX: 53.92 KG/M2 | HEART RATE: 102 BPM | HEIGHT: 62 IN | SYSTOLIC BLOOD PRESSURE: 136 MMHG

## 2022-08-25 LAB
ANION GAP SERPL CALCULATED.3IONS-SCNC: 7 MMOL/L (ref 7–15)
BUN SERPL-MCNC: 13.2 MG/DL (ref 6–20)
CALCIUM SERPL-MCNC: 9.7 MG/DL (ref 8.6–10)
CHLORIDE SERPL-SCNC: 105 MMOL/L (ref 98–107)
CREAT SERPL-MCNC: 0.55 MG/DL (ref 0.51–0.95)
DEPRECATED HCO3 PLAS-SCNC: 29 MMOL/L (ref 22–29)
ERYTHROCYTE [DISTWIDTH] IN BLOOD BY AUTOMATED COUNT: 13.3 % (ref 10–15)
GFR SERPL CREATININE-BSD FRML MDRD: >90 ML/MIN/1.73M2
GLUCOSE BLDC GLUCOMTR-MCNC: 137 MG/DL (ref 70–99)
GLUCOSE SERPL-MCNC: 135 MG/DL (ref 70–99)
HCT VFR BLD AUTO: 36.1 % (ref 35–47)
HGB BLD-MCNC: 11.6 G/DL (ref 11.7–15.7)
MCH RBC QN AUTO: 29.7 PG (ref 26.5–33)
MCHC RBC AUTO-ENTMCNC: 32.1 G/DL (ref 31.5–36.5)
MCV RBC AUTO: 92 FL (ref 78–100)
PLATELET # BLD AUTO: 232 10E3/UL (ref 150–450)
POTASSIUM SERPL-SCNC: 3.8 MMOL/L (ref 3.4–5.3)
RBC # BLD AUTO: 3.91 10E6/UL (ref 3.8–5.2)
SODIUM SERPL-SCNC: 141 MMOL/L (ref 136–145)
WBC # BLD AUTO: 5.6 10E3/UL (ref 4–11)

## 2022-08-25 PROCEDURE — 99217 PR OBSERVATION CARE DISCHARGE: CPT | Performed by: STUDENT IN AN ORGANIZED HEALTH CARE EDUCATION/TRAINING PROGRAM

## 2022-08-25 PROCEDURE — 82310 ASSAY OF CALCIUM: CPT | Performed by: STUDENT IN AN ORGANIZED HEALTH CARE EDUCATION/TRAINING PROGRAM

## 2022-08-25 PROCEDURE — 85027 COMPLETE CBC AUTOMATED: CPT | Performed by: STUDENT IN AN ORGANIZED HEALTH CARE EDUCATION/TRAINING PROGRAM

## 2022-08-25 PROCEDURE — 250N000011 HC RX IP 250 OP 636: Performed by: STUDENT IN AN ORGANIZED HEALTH CARE EDUCATION/TRAINING PROGRAM

## 2022-08-25 PROCEDURE — 250N000013 HC RX MED GY IP 250 OP 250 PS 637: Performed by: STUDENT IN AN ORGANIZED HEALTH CARE EDUCATION/TRAINING PROGRAM

## 2022-08-25 PROCEDURE — 82374 ASSAY BLOOD CARBON DIOXIDE: CPT | Performed by: STUDENT IN AN ORGANIZED HEALTH CARE EDUCATION/TRAINING PROGRAM

## 2022-08-25 PROCEDURE — 96376 TX/PRO/DX INJ SAME DRUG ADON: CPT

## 2022-08-25 PROCEDURE — 82962 GLUCOSE BLOOD TEST: CPT

## 2022-08-25 PROCEDURE — 36415 COLL VENOUS BLD VENIPUNCTURE: CPT | Performed by: STUDENT IN AN ORGANIZED HEALTH CARE EDUCATION/TRAINING PROGRAM

## 2022-08-25 PROCEDURE — G0378 HOSPITAL OBSERVATION PER HR: HCPCS

## 2022-08-25 RX ORDER — ACETAMINOPHEN 325 MG/1
650 TABLET ORAL EVERY 8 HOURS PRN
Qty: 10 TABLET | Refills: 0 | Status: SHIPPED | OUTPATIENT
Start: 2022-08-25 | End: 2023-01-15

## 2022-08-25 RX ORDER — ACETAMINOPHEN 325 MG/1
650 TABLET ORAL EVERY 8 HOURS PRN
Qty: 10 TABLET | Refills: 0 | Status: SHIPPED | OUTPATIENT
Start: 2022-08-25 | End: 2022-08-25

## 2022-08-25 RX ADMIN — Medication 50 MCG: at 09:10

## 2022-08-25 RX ADMIN — PREGABALIN 100 MG: 100 CAPSULE ORAL at 09:10

## 2022-08-25 RX ADMIN — HYDROMORPHONE HYDROCHLORIDE 0.5 MG: 1 INJECTION, SOLUTION INTRAMUSCULAR; INTRAVENOUS; SUBCUTANEOUS at 09:01

## 2022-08-25 RX ADMIN — BUSPIRONE HYDROCHLORIDE 20 MG: 10 TABLET ORAL at 14:39

## 2022-08-25 RX ADMIN — FERROUS SULFATE TAB 325 MG (65 MG ELEMENTAL FE) 325 MG: 325 (65 FE) TAB at 09:10

## 2022-08-25 RX ADMIN — PREGABALIN 100 MG: 100 CAPSULE ORAL at 14:39

## 2022-08-25 RX ADMIN — BUSPIRONE HYDROCHLORIDE 20 MG: 10 TABLET ORAL at 09:10

## 2022-08-25 RX ADMIN — DESVENLAFAXINE SUCCINATE 100 MG: 100 TABLET, EXTENDED RELEASE ORAL at 09:10

## 2022-08-25 RX ADMIN — HYDROXYCHLOROQUINE SULFATE 200 MG: 200 TABLET, FILM COATED ORAL at 09:10

## 2022-08-25 RX ADMIN — HYDROCHLOROTHIAZIDE 25 MG: 25 TABLET ORAL at 09:10

## 2022-08-25 RX ADMIN — HYDROMORPHONE HYDROCHLORIDE 0.5 MG: 1 INJECTION, SOLUTION INTRAMUSCULAR; INTRAVENOUS; SUBCUTANEOUS at 04:41

## 2022-08-25 ASSESSMENT — ACTIVITIES OF DAILY LIVING (ADL)
ADLS_ACUITY_SCORE: 27
DEPENDENT_IADLS:: INDEPENDENT
ADLS_ACUITY_SCORE: 27
ADLS_ACUITY_SCORE: 27

## 2022-08-25 NOTE — PLAN OF CARE
"          BP (!) 145/73 (BP Location: Right arm)   Pulse 104   Temp 97.5  F (36.4  C) (Oral)   Resp 18   Ht 1.575 m (5' 2\")   Wt 133.8 kg (295 lb)   LMP  (LMP Unknown)   SpO2 94%   BMI 53.96 kg/m      Status: POD #1, s/p EGD for foreign body removal. VSS on RA   Pain: Abdomen, managed with regimens per eMAR, sore throat, able swallow.   Nausea: Denies.   Mobility: Independent, SBA for suicide watch.   Diet: Reg, adequate intake.   Labs: Reviewed.   LDAs: PIV, SL.   Skin/incisions: WDL.   Neuro: A&O x4, suicide watch.   Respiratory: WDL.  Cardiac: WDL.   GI/: Last BM 8/24, Voided x1 overnight for 300 mL, PVR: 19    Plan:  Continue SI precautions      "

## 2022-08-25 NOTE — PLAN OF CARE
"Goal Outcome Evaluation:    Plan of Care Reviewed With: patient     Overall Patient Progress: no change       /77 (BP Location: Right arm)   Pulse 105   Temp 98  F (36.7  C) (Oral)   Resp 16   Ht 1.575 m (5' 2\")   Wt 133.8 kg (295 lb)   LMP  (LMP Unknown)   SpO2 90%   BMI 53.96 kg/m      Status: POD #1, s/p EGD today. VSS.   Pain: Abdomen, managed with regimens per eMAR, sore throat, able swallow.   Nausea: Denies.   Mobility: Independent, SBA for suicide watch.   Diet: Reg, adequate intake.   Labs: Reviewed.   LDAs: PIV, SL.   Skin/incisions: WDL.   Neuro: A&O x4, suicide watch.   Respiratory: WDL.  Cardiac: WDL.   GI/: BS normal active x4, large BM x1, due to void. Denies fullness of bladder.   New Changes: None.   Plan:  Due to void, monitor for urinary retention. Maintain suicide watch.     "

## 2022-08-25 NOTE — CONSULTS
Care Management Initial Consult    General Information  Assessment completed with: Patient,    Type of CM/SW Visit: Initial Assessment    Primary Care Provider verified and updated as needed: Yes   Readmission within the last 30 days:        Reason for Consult: discharge planning  Advance Care Planning: Advance Care Planning Reviewed: no concerns identified          Communication Assessment  Patient's communication style: spoken language (English or Bilingual)    Hearing Difficulty or Deaf: no   Wear Glasses or Blind: yes    Cognitive  Cognitive/Neuro/Behavioral: WDL  Level of Consciousness: alert  Arousal Level: opens eyes spontaneously  Orientation: oriented x 4  Mood/Behavior: agitated  Best Language: 0 - No aphasia  Speech: logical, spontaneous, clear    Living Environment:   People in home: facility resident ([facility])     Current living Arrangements: other (see comments) (crisis center)      Able to return to prior arrangements: yes       Family/Social Support:  Care provided by: self  Provides care for: no one  Marital Status: Single             Description of Support System:  (did not discuss)         Current Resources:   Patient receiving home care services: No     Community Resources:  ([facility])  Equipment currently used at home: none  Supplies currently used at home: None    Employment/Financial:  Employment Status:  (did not discuss)        Financial Concerns: No concerns identified           Lifestyle & Psychosocial Needs:  Social Determinants of Health     Tobacco Use: Low Risk      Smoking Tobacco Use: Never Smoker     Smokeless Tobacco Use: Never Used   Alcohol Use: Not on file   Financial Resource Strain: Not on file   Food Insecurity: Not on file   Transportation Needs: Not on file   Physical Activity: Not on file   Stress: Not on file   Social Connections: Not on file   Intimate Partner Violence: Not on file   Depression: Not on file   Housing Stability: Not on file  "      Functional Status:  Prior to admission patient needed assistance:   Dependent ADLs:: Independent  Dependent IADLs:: Independent  Assesssment of Functional Status: At functional baseline    Mental Health Status:  Mental Health Status: Current Concern  Mental Health Management: Medication, Psychiatrist    Chemical Dependency Status:  Chemical Dependency Status: Current Concern             Values/Beliefs:  Spiritual, Cultural Beliefs, Scientologist Practices, Values that affect care: no               Additional Information:  Patient is a 31 year old female who is s/p EGD with foreign body removal.  Per MD team pt is anticipated to be ready for discharge today.  Called pt's guardian, Janie, to review JETT and discuss discharge planning.  Janie reports pt is living in a Crisis [facility], [facility], while they are working to find her a new group home.  Pt's previous group home closed due to no staffing.  Per Janie pt can return to the Crisis Center whenever she is medically ready.  She said she would let them know pt is returning today.  Janie said we could set up a [employer] cab ride for her to return.  They are looking to find a group home that can provide 1:1 staffing, but they have not had any luck.      Called Thad,  at [facility], and he reported pt can return anytime.  Staff manages pt's medications.  Thad reports they have staffing to provide 1:1 staffing for the pt but pt has \"alone time\" so gets to choose when she has 1:1 staffing and when she does not.  Due to this she often does not have 1:1 staffing.  Thad reports that Janie says that the alone time is something she feels she can not take away from the pt.  Thad reports that if Janie verbally told Stephenie she does not get alone time anymore they would legally need to be with her 24/7.  Thad reports due to having this alone time she was able to leave the home and go to [facility] where she bought the mask with the wire she " swallowed.  He feels due to pt's high risk of harming herself that she should have the alone time taken away.  Updated MD and Dr. Randall with psychiatry with this information.  Dr. Randall will have a discussion with Janie regarding this.  RNCC will remain available if further needs arise.     ADDENDUM 2700: Dr. Randall spoke with Janie who reported she would need a court order to revoke the pt's alone time and needs MD documentation to support this.  Dr. Randall wrote a note in regards to this and it was faxed to Janie at Fax: [phone].     ADDENDUM 1333: Dr. Ann updated this writer that the patient was medically ready for discharge.  He is discontinuing her benadryl and atarax and wanted us to update the Crisis Center regarding this.  Called and updated Thad. He asked for orders to be faxed.  This was completed.  [employer] arranged for 1500.  Bedside nurse made aware.      [employer]  Phone: [phone]       [facility], [facility]   P: [phone]  Fax: [phone]    [facility]  Thad-    P: [phone]  Fax: [phone]  [address] [place] [id number]    TOM Irizarry  Phone: [phone]  Pager: [phone]  To contact the weekend RNCC  Reading (0800 - 1630) Saturday and Sunday    Units: 4A, 4C, 4E, 5A and 5B- Pager 1: [phone]    Units: 6A, 6B, 6C, 6D- Pager 2: [phone]    Units: 7A, 7B, 7C, 7D, and 5C-Pager 3: [phone]

## 2022-08-25 NOTE — TELEPHONE ENCOUNTER
"Nurse Triage SBAR    Situation: patient called complaining headache, rating 5/9. Stiff neck. No fever. Denies vomiting. She has History of migraine.      Background: patient stated she was at the hospital yesterday, where she had upper endoscopy test.     Assessment: headache, body ache, Stiff neck.     Protocol Recommended Disposition:   See PCP Within 24 Hours    Recommendation: PCP within 24hrs. Or Urgent care if no appointment with her clinic.     Reason for Disposition    [1] MODERATE headache (e.g., interferes with normal activities) AND [2] present > 24 hours AND [3] unexplained  (Exceptions: analgesics not tried, typical migraine, or headache part of viral illness)    Additional Information    Negative: Difficult to awaken or acting confused (e.g., disoriented, slurred speech)    Negative: [1] Weakness of the face, arm or leg on one side of the body AND [2] new-onset    Negative: [1] Numbness of the face, arm or leg on one side of the body AND [2] new-onset    Negative: [1] Loss of speech or garbled speech AND [2] new-onset    Negative: Passed out (i.e., lost consciousness, collapsed and was not responding)    Negative: Sounds like a life-threatening emergency to the triager    Negative: Followed a head injury    Negative: Pregnant    Negative: Postpartum (from 0 to 6 weeks after delivery)    Negative: Traumatic Brain Injury (TBI) is suspected    Negative: Unable to walk, or can only walk with assistance (e.g., requires support)    Negative: Stiff neck (can't touch chin to chest)    Negative: Severe pain in one eye    Negative: [1] Other family members (or roommates) with headaches AND [2] possibility of carbon monoxide exposure    Negative: [1] SEVERE headache (e.g., excruciating) AND [2] \"worst headache\" of life    Negative: [1] SEVERE headache AND [2] sudden-onset (i.e., reaching maximum intensity within seconds to 1 hour)    Negative: [1] SEVERE headache AND [2] fever    Negative: Loss of vision or " double vision (Exception: same as prior migraines)    Negative: [1] Fever > 100.0 F (37.8 C) AND [2] diabetes mellitus or weak immune system (e.g., HIV positive, cancer chemo, splenectomy, organ transplant, chronic steroids)    Negative: Patient sounds very sick or weak to the triager    Negative: [1] SEVERE headache (e.g., excruciating) AND [2] not improved after 2 hours of pain medicine    Negative: [1] Vomiting AND [2] 2 or more times (Exception: similar to previous migraines)    Negative: Fever > 104 F (40 C)    Protocols used: HEADACHE-A-    Olga Mccallum RN on 8/25/2022 at 5:54 PM

## 2022-08-25 NOTE — DISCHARGE SUMMARY
[facility]  Hospitalist Discharge Summary      Date of Admission:  8/23/2022  Date of Discharge:  8/25/2022  3:14 PM  Discharging Provider: Cody Ann MD  Discharge Service: Hospitalist Service, GOLD TEAM 7    Discharge Diagnoses   # Foreign body ingestion  # Abdominal pain  # Uvula injury  # Odynophagia, throat pain  # Borderline personality disorder, depression, PTSD  # HTN  # Granuloma annulare  # DM2  # Polyneuropathy  # Severe Obesity  # OS    Follow-ups Needed After Discharge   Follow-up Appointments     Adult [facility] Follow-up and recommended labs and tests      Follow up with primary care provider, Latonya Knight, within 7 days for   hospital follow- up.  The following labs/tests are recommended:   hemoglobin.      Please follow up on FB swallowing, anxiety, depression, psych med change,   hgb drop, mild throat pain, BPD, PTSD, DM2 and ZOHRA. Diphenhydramine and   atarax held due to decreased urination. Small amount of tylenol given for   throat pain.    Appointments on [place] and/or [facility] (with [facility] or [facility]   provider or service). Call [phone] if you haven't heard regarding   these appointments within 7 days of discharge.             Unresulted Labs Ordered in the Past 30 Days of this Admission     No orders found from 7/24/2022 to 8/24/2022.      These results will be followed up by NA    Discharge Disposition   Discharged to group home  Condition at discharge: Stable      Hospital Course      Nevin Alvarado is a 31 year old female admitted on 8/23/2022. She has a history of complex psych history (borderline personality disorder, MDD, PTSD), frequent foreign body ingestions complicated by esophageal perforation in 2019, and GERD and is admitted for foreign body ingestion (wire from mask)    # Foreign body ingestion  # Abdominal pain, improved  Pt with frequent foreign body ingestions in past. Swallowed ~15 cm wire from a mask at  1900 on 8/23 in setting of increased anxiety, not in suicide attempt per pt. AXR with wire seen in LUQ, likely stomach without free air. CXR unremarkable. Abdominal pain and nausea present. EGD with removal of wire. Discussed with GI and no further follow up from EGD intervention/results. Improved abdominal pain after wire removal. Tolerated diet.  - DC on Tylenol for mild pain  - Psych saw patient in hospital. Continue current outpatient regimen (plan to slowly switch to Rexulti from Latuda)   -Recommendation for 1:1 attendant at all times in order to ensure patient safety  -All recommendations/changes discussed with patient guardian (Aaliyah)  -Patient and guardian instructed to follow up at PCP clinic, regarding upcoming lab draws, and medication changes. Patient and guardian understood instructions and all questions answered.     # Uvula injury  # Odynophagia, throat pain, improved  Sustained full-thickness uvula separation during EGD at [facility] last week during EGD with foreign body removal. Continues to have sxs of throat pain, hoarse voice, odynophagia but improved by DC. Evaluated by ENT 8/22 - will follow-up in 3-4 weeks as outpatient. PO tylenol as above    # Borderline personality disorder, depression, PTSD  Follows with outside psychiatry. Per pt she talked with psych in AM on 8/23 and planned on some medication adjustments that were sent to her pharmacy but she hadn't started.  - No med changes per psych (saw in hospital), ok to proceed with prior psych med plan (discussed with Dr. Randall)  - Outpatient follow-up with her psychiatrist    # Decreased urination  Developed decreased urination during admission. Was receiving fluids throughout. Straight cath with 75cc of urine. US renal normal and without DENISE on BMP. Patient spontaneously urinated multiple times prior to DC. Benadryl and Atarax held on DC. Pending need for itching/anxiety would reinstate outpatient as able.    #Hgb drop  Hgb 11.6 (from last  value of 13.3 on 8/23) in the setting of IVF. No bleeding noted per patient. VSS. Would repeat hgb as outpatient.      Consultations This Hospital Stay   GI LUMINAL ADULT IP CONSULT  PSYCHIATRY IP CONSULT  PSYCHIATRY IP CONSULT  SOCIAL WORK IP CONSULT  SOCIAL WORK IP CONSULT  CARE MANAGEMENT / SOCIAL WORK IP CONSULT    Code Status   Full Code    Time Spent on this Encounter   ICody MD, personally saw the patient today and spent greater than 30 minutes discharging this patient.       Cody Ann MD  [facility] [address] [address]  Phone: [phone]  ______________________________________________________________________    Physical Exam   Vital Signs: Temp: 97.8  F (36.6  C) Temp src: Oral BP: 136/79 Pulse: 102   Resp: 18 SpO2: 93 % O2 Device: None (Room air)    Weight: 295 lbs 0 oz  General Appearance: well appearing, NAD lying in bed  Respiratory: Non-labored on room air, CTAB  Cardiovascular: RRR, no murmurs  GI: Soft, non-distended, obese, tender to palpation in epigastric region without rebound or guarding (improving)  Extremities: WWP, no peripheral edema  Neurologic: AAOx4, grossly non-focal  Psychiatric: Calm and cooperative, no suicidal ideation       Primary Care Physician   Latonya Knight    Discharge Orders      Reason for your hospital stay    You were hospitalized due to foreign body swallowing.     Activity    Your activity upon discharge: activity as tolerated     Discharge Instructions    Patient needs 1:1 attendant at all times.     Adult [email or web address] Follow-up and recommended labs and tests    Follow up with primary care provider, Latonya Knight, within 7 days for hospital follow- up.  The following labs/tests are recommended: hemoglobin.      Please follow up on FB swallowing, anxiety, depression, psych med change, hgb drop, mild throat pain, BPD, PTSD, DM2 and ZOHRA. Diphenhydramine and atarax held due to decreased urination. Small  amount of tylenol given for throat pain.    Appointments on [place] and/or [facility] (with [facility] or [facility] provider or service). Call [phone] if you haven't heard regarding these appointments within 7 days of discharge.     Bedside Attendant    Recommendation for 1:1 attendant at all times due to foreign body ingestion.     Suicide precautions     Diet    Follow this diet upon discharge: Orders Placed This Encounter      Combination Diet Regular Diet; Safe Tray - NO utensils       Significant Results and Procedures   Most Recent 3 CBC's:Recent Labs   Lab Test 08/25/22  0759 08/23/22 2018 08/22/22 1815   WBC 5.6 8.9 12.2*   HGB 11.6* 13.3 13.6   MCV 92 90 90    277 296     Most Recent 3 BMP's:Recent Labs   Lab Test 08/25/22 0759 08/25/22 0756 08/24/22 1817 08/23/22 2018     --  141 139   POTASSIUM 3.8  --  4.3 4.0   CHLORIDE 105  --  104 99   CO2 29  --  29 27   BUN 13.2  --  14.1 15.8   CR 0.55  --  0.57 0.61   ANIONGAP 7  --  8 13   KELBY 9.7  --  8.8 9.4   * 137* 129* 161*   ,   Results for orders placed or performed during the hospital encounter of 08/23/22   XR Chest 2 Views    Narrative    XR CHEST 2 VIEWS  8/23/2022 8:53 PM      HISTORY: Swallowed foreign body, wire from mask    COMPARISON: 8/22/2022    FINDINGS: PA and lateral views of the chest. The cardiac silhouette is  within normal limits. No acute airspace opacity, pleural effusion, or  pneumothorax. Radiopaque wire is partially seen over the upper abdomen  on the lateral view. Cholecystectomy clips      Impression    IMPRESSION: Radiopaque wire is partially seen projecting over the  upper abdomen. No acute airspace disease.    I have personally reviewed the examination and initial interpretation  and I agree with the findings.    PATRICK FAM DO         SYSTEM ID:  [id number]   XR Abdomen 2 Views    Narrative    XR ABDOMEN 2 VIEWS  8/23/2022 8:52 PM      HISTORY: Swallowed foreign body, wire from mask    COMPARISON:  8/22/2022    FINDINGS: AP view of the abdomen. Single radiopaque wire with small  hook on the end projects over the left upper quadrant. No definite  free air or pneumatosis. Moderate colonic stool. Cholecystectomy  clips.      Impression    IMPRESSION: Single radiopaque wire with small hook on the end projects  over the left upper quadrant, likely within the stomach.     I have personally reviewed the examination and initial interpretation  and I agree with the findings.    PATRICK FAM DO         SYSTEM ID:  [id number]   US Renal Complete    Narrative    EXAMINATION: US RENAL COMPLETE, 8/24/2022 6:36 PM     COMPARISON: Chest, abdomen and pelvic CT 2/13/2022    HISTORY: Not voiding despite aggressive IVF hydration, assess for  obstruction    TECHNIQUE: The kidneys and bladder were scanned in the standard  fashion with specialized ultrasound transducer(s) using both gray  scale and limited color/spectral Doppler techniques.    FINDINGS:  Suboptimal exam secondary to patient body habitus.    Right kidney: Measures 10.1 cm in length. Parenchyma is of normal  thickness and echogenicity. No focal mass. No hydronephrosis.    Left kidney: Measures 10.1 cm in length. Parenchyma is of normal  thickness and echogenicity. No focal mass. No hydronephrosis.     Bladder: Not visualized, presumably under distended.      Impression    IMPRESSION:  1.  Normal renal ultrasound.  2.  Bladder is not visualized, presumably nondistended.    I have personally reviewed the examination and initial interpretation  and I agree with the findings.    FLETCHER LEGER MD         SYSTEM ID:  [id number]     *Note: Due to a large number of results and/or encounters for the requested time period, some results have not been displayed. A complete set of results can be found in Results Review.       Discharge Medications   Discharge Medication List as of 8/25/2022  2:35 PM      CONTINUE these medications which have CHANGED    Details   acetaminophen  (TYLENOL) 325 MG tablet Take 2 tablets (650 mg) by mouth every 8 hours as needed for mild pain, Disp-10 tablet, R-0, E-Prescribe         CONTINUE these medications which have NOT CHANGED    Details   albuterol (PROAIR HFA/PROVENTIL HFA/VENTOLIN HFA) 108 (90 Base) MCG/ACT inhaler Inhale 2 puffs into the lungs every 6 hours as needed for shortness of breath / dyspnea or wheezing, Disp-18 g, R-0, Local [facility] may dispense brand covered by insurance (Proair, or proventil or ventolin or generic albuterol inhaler)      albuterol (PROVENTIL) (2.5 MG/3ML) 0.083% neb solution Take 2.5 mg by nebulization every 6 hours as needed for shortness of breath / dyspnea or wheezing, Historical      alum & mag hydroxide-simethicone (MAALOX MAX) 400-400-40 MG/5ML SUSP suspension Take 15 mLs by mouth every 6 hours as needed for heartburn or other (sore throat), Disp-355 mL, R-0, E-Prescribe      brexpiprazole (REXULTI) 0.5 MG tablet Take 0.5 mg by mouth daily for 1 week, and reduce Latuda to 20 mg daily for 1 week. After 1 week, increase Rexulti to 1 mg by mouth daily and stop Latuda., Historical      busPIRone (BUSPAR) 10 MG tablet Take 2 tablets (20 mg) by mouth 3 times daily, Disp-180 tablet, R-0, E-Prescribe      cetirizine (ZYRTEC) 10 MG tablet Take 1 tablet (10 mg) by mouth daily, Disp-30 tablet, R-0, E-Prescribe      Cholecalciferol (VITAMIN D) 50 MCG (2000 UT) CAPS Take 2,000 Units by mouth daily, Disp-30 capsule, R-0, E-Prescribe      desvenlafaxine (PRISTIQ) 100 MG 24 hr tablet Take 1 tablet (100 mg) by mouth daily, Disp-30 tablet, R-0, E-Prescribe      ferrous sulfate (FEROSUL) 325 (65 Fe) MG tablet Take 1 tablet (325 mg) by mouth daily (with breakfast), Disp-30 tablet, R-0, E-Prescribe      hydrochlorothiazide (HYDRODIURIL) 25 MG tablet Take 25 mg by mouth daily before breakfast, Historical      hydroxychloroquine (PLAQUENIL) 200 MG tablet Take 1 tablet (200 mg) by mouth 2 times daily, Disp-30 tablet, R-0, E-Prescribe       ibuprofen (ADVIL/MOTRIN) 600 MG tablet Take 600 mg by mouth every 6 hours as needed, Historical      lidocaine, viscous, (XYLOCAINE) 2 % solution Swish and swallow 15 mLs in mouth every 6 hours as needed for pain ; Max 8 doses/24 hour period., Disp-300 mL, R-0, E-Prescribe      metFORMIN (GLUCOPHAGE XR) 500 MG 24 hr tablet Take 1,000 mg by mouth daily (with dinner) Take at 5 pm., Historical      montelukast (SINGULAIR) 10 MG tablet Take 10 mg by mouth every evening, Historical      ondansetron (ZOFRAN-ODT) 4 MG ODT tab Take 1 tablet (4 mg) by mouth every 8 hours as needed for nausea, Disp-15 tablet, R-0, E-Prescribe      pregabalin (LYRICA) 100 MG capsule Take 1 capsule (100 mg) by mouth 3 times daily, Disp-90 capsule, R-0, E-Prescribe      SUMAtriptan (IMITREX) 25 MG tablet Take 25 mg by mouth as needed, Historical      valACYclovir (VALTREX) 1000 mg tablet Take 2 tablets by mouth two times daily for one day. Use as needed at onset of cold sore. , Historical      lurasidone (LATUDA) 40 MG TABS tablet Take 1 tablet (40 mg) by mouth daily (with dinner), Disp-30 tablet, R-0, E-Prescribe      Respiratory Therapy Supplies (NEBULIZER) BRENDAN Nebulizer device.  Albuterol nebulization every 2 hours as needed for shortness of breath or wheezing., Disp-1 each, R-0, Local PrintInclude tubing and mask for age please.         STOP taking these medications       diphenhydrAMINE (BENADRYL) 25 MG tablet Comments:   Reason for Stopping:         hydrOXYzine (ATARAX) 25 MG tablet Comments:   Reason for Stopping:             Allergies   Allergies   Allergen Reactions     Amoxicillin-Pot Clavulanate Other (See Comments), Swelling and Rash     PN: facial swelling, left side. Also had cortisone injection the same day as she started the Augmentin.  Noted in downtime recovery of chart.    PN: facial swelling, left side. Also had cortisone injection the same day as she started the Augmentin.; [facility] Comment: PN: facial swelling, left side.  Also had cortisone injection the same day as she started the Augmentin.Noted in downtime recovery of chart.; HUT Reaction: Rash; HUT Reaction: Unknown; HUT Reaction Type: Allergy; HUT Severity: Med; HUT Noted: [id number]     Hydrocodone-Acetaminophen Nausea and Vomiting and Rash     Update on 12/12  Pt says she can take tylenol just not the hydrocodone.   Other reaction(s): Rash       Latex Rash     HUT Reaction: Rash; HUT Reaction Type: Allergy; HUT Severity: Low; HUT Noted: [id number]  Other reaction(s): Rash       Oseltamivir Hives     med stopped, PN: med stopped  med stopped, PN: med stopped; HUT Comment: med stopped, PN: med stopped; HUT Reaction: Hives; HUT Reaction Type: Allergy; HUT Severity: Med; HUT Noted: [id number]     Penicillins Anaphylaxis     HUT Reaction: Anaphylaxis; HUT Reaction Type: Allergy; HUT Severity: High; HUT Noted: [id number]     Vancomycin Itching, Swelling and Rash     Other reaction(s): Redness  Flushed face, very itchy; HUT Comment: Flushed face, very itchy; HUT Reaction: Angioedema; HUT Reaction: Redness; HUT Severity: Med; HUT Noted: [id number]    facial     Hydrocodone Nausea and Vomiting and GI Disturbance     vomiting for days, PN: vomiting for days; HUT Comment: vomiting for days; HUT Reaction: Gastrointestinal; HUT Reaction: Nausea And Vomiting; HUT Reaction Type: Intolerance; HUT Severity: Med; HUT Noted: [id number]  vomiting for days       Blood-Group Specific Substance Other (See Comments)     Patient has an anti-Cw and non-specific antibodies. Blood product orders may be delayed. Draw one red top and two purple top tubes for all type/screen/crossmatch orders.  Patient has anti-Cw, anti-K ([place]), Warm auto and nonspecific antibodies. Blood products may be delayed. Draw patient 24 hours prior to transfusion. Draw one red top and two purple top tubes for all type and screen orders.     Clavulanic Acid Angioedema     Fentanyl Itching     Naltrexone Other (See Comments)     Reaction(s):  Vivid dreams.     Other Drug Allergy (See Comments)      See original file MRN [id number]. Files are marked for merge     Oxycodone Swelling     Adhesive Tape Rash     Silicone type     Band-Aid Anti-Itch      Other reaction(s): adhesive allergy     Cephalosporins Rash     Lamotrigine Rash     Possibly associated with Lamictal.   HUT Comment: Possibly associated with Lamictal. ; HUT Reaction: Rash; HUT Reaction Type: Allergy; HUT Severity: Low; HUT Noted: [id number]

## 2022-08-25 NOTE — CONSULTS
"  Addendum to my note from yesterday.  We reviewed therapy options. DBT made her worse I think I convinced her that CBT is different enough that I think it would help. She agreed to buy the \"Mind over Mood\" book. I also gave her the name of a psychotherapist who helps patients work on the book (Varinder Mcclelland).   I also spoke to her nurse Judith who informed me that her guardian Aaliyah ([phone]) is unable to revoke Nevin's alone time without a court order, and a doctor's recommendation that she needs an attendant at all times.   She certainly does need a full time attendant for her safety, so this is my recommendation.    Notice of this can be faxed to [phone]    Barney Randall M.D.   [facility]   Contact information available via [facility] Paging/Directory  If I am not available, then [facility] CL line ([phone]) should know who   Is covering our consult service.        "

## 2022-08-25 NOTE — PROGRESS NOTES
Per pt, pt supposed to wear CPAP at home but CPAP machine is in storage in the process of moving, therefore has not worn it in a while. RT offered V60 for tonight, pt declined and said sleeps well without one.

## 2022-08-26 ENCOUNTER — PATIENT OUTREACH (OUTPATIENT)
Dept: CARE COORDINATION | Facility: CLINIC | Age: 32
End: 2022-08-26

## 2022-08-26 NOTE — PROGRESS NOTES
[facility]    Background: Transitional Care Management program auto-identified and prompting a chart review by [facility] team.    Assessment: Upon chart review, [facility] Team member will cancel/close this episode of Transitional Care Management program due to reason below:    Patient discharged to [facility].    Plan: Transitional Care Management episode closed per reason above.      Alma Jung MA  [facility] Resource [place], [facility]    *Connected Care Resource Team does NOT follow patient ongoing. Referrals are identified based on internal discharge reports and the outreach is to ensure patient has an understanding of their discharge instructions.

## 2022-08-28 ENCOUNTER — APPOINTMENT (OUTPATIENT)
Dept: GENERAL RADIOLOGY | Facility: CLINIC | Age: 32
End: 2022-08-28
Attending: EMERGENCY MEDICINE
Payer: COMMERCIAL

## 2022-08-28 ENCOUNTER — HOSPITAL ENCOUNTER (EMERGENCY)
Facility: CLINIC | Age: 32
Discharge: HOME | End: 2022-08-29
Attending: EMERGENCY MEDICINE | Admitting: EMERGENCY MEDICINE
Payer: COMMERCIAL

## 2022-08-28 DIAGNOSIS — R10.12 ABDOMINAL PAIN, LEFT UPPER QUADRANT: ICD-10-CM

## 2022-08-28 DIAGNOSIS — R04.2 HEMOPTYSIS: ICD-10-CM

## 2022-08-28 LAB
ALBUMIN SERPL BCG-MCNC: 4.4 G/DL (ref 3.5–5.2)
ALP SERPL-CCNC: 82 U/L (ref 35–104)
ALT SERPL W P-5'-P-CCNC: 66 U/L (ref 10–35)
ANION GAP SERPL CALCULATED.3IONS-SCNC: 12 MMOL/L (ref 7–15)
AST SERPL W P-5'-P-CCNC: 42 U/L (ref 10–35)
BASOPHILS # BLD AUTO: 0.1 10E3/UL (ref 0–0.2)
BASOPHILS NFR BLD AUTO: 1 %
BILIRUB SERPL-MCNC: 0.2 MG/DL
BUN SERPL-MCNC: 8 MG/DL (ref 6–20)
CALCIUM SERPL-MCNC: 9.8 MG/DL (ref 8.6–10)
CHLORIDE SERPL-SCNC: 100 MMOL/L (ref 98–107)
CREAT SERPL-MCNC: 0.67 MG/DL (ref 0.51–0.95)
D DIMER PPP FEU-MCNC: <0.27 UG/ML FEU (ref 0–0.5)
DEPRECATED HCO3 PLAS-SCNC: 28 MMOL/L (ref 22–29)
EOSINOPHIL # BLD AUTO: 0.2 10E3/UL (ref 0–0.7)
EOSINOPHIL NFR BLD AUTO: 3 %
ERYTHROCYTE [DISTWIDTH] IN BLOOD BY AUTOMATED COUNT: 13.2 % (ref 10–15)
GFR SERPL CREATININE-BSD FRML MDRD: >90 ML/MIN/1.73M2
GLUCOSE SERPL-MCNC: 111 MG/DL (ref 70–99)
HCT VFR BLD AUTO: 40.1 % (ref 35–47)
HGB BLD-MCNC: 13.4 G/DL (ref 11.7–15.7)
HOLD SPECIMEN: NORMAL
IMM GRANULOCYTES # BLD: 0 10E3/UL
IMM GRANULOCYTES NFR BLD: 0 %
LYMPHOCYTES # BLD AUTO: 1.6 10E3/UL (ref 0.8–5.3)
LYMPHOCYTES NFR BLD AUTO: 21 %
MCH RBC QN AUTO: 30 PG (ref 26.5–33)
MCHC RBC AUTO-ENTMCNC: 33.4 G/DL (ref 31.5–36.5)
MCV RBC AUTO: 90 FL (ref 78–100)
MONOCYTES # BLD AUTO: 0.8 10E3/UL (ref 0–1.3)
MONOCYTES NFR BLD AUTO: 10 %
NEUTROPHILS # BLD AUTO: 5.2 10E3/UL (ref 1.6–8.3)
NEUTROPHILS NFR BLD AUTO: 65 %
NRBC # BLD AUTO: 0 10E3/UL
NRBC BLD AUTO-RTO: 0 /100
PLATELET # BLD AUTO: 259 10E3/UL (ref 150–450)
POTASSIUM SERPL-SCNC: 3.6 MMOL/L (ref 3.4–5.3)
PROT SERPL-MCNC: 7.1 G/DL (ref 6.4–8.3)
RBC # BLD AUTO: 4.47 10E6/UL (ref 3.8–5.2)
SODIUM SERPL-SCNC: 140 MMOL/L (ref 136–145)
WBC # BLD AUTO: 7.8 10E3/UL (ref 4–11)

## 2022-08-28 PROCEDURE — 74018 RADEX ABDOMEN 1 VIEW: CPT | Mod: 26 | Performed by: RADIOLOGY

## 2022-08-28 PROCEDURE — 93005 ELECTROCARDIOGRAM TRACING: CPT | Performed by: EMERGENCY MEDICINE

## 2022-08-28 PROCEDURE — 80053 COMPREHEN METABOLIC PANEL: CPT | Performed by: NURSE PRACTITIONER

## 2022-08-28 PROCEDURE — 250N000013 HC RX MED GY IP 250 OP 250 PS 637: Performed by: EMERGENCY MEDICINE

## 2022-08-28 PROCEDURE — 85379 FIBRIN DEGRADATION QUANT: CPT | Performed by: NURSE PRACTITIONER

## 2022-08-28 PROCEDURE — 99284 EMERGENCY DEPT VISIT MOD MDM: CPT | Mod: 25 | Performed by: EMERGENCY MEDICINE

## 2022-08-28 PROCEDURE — 71045 X-RAY EXAM CHEST 1 VIEW: CPT

## 2022-08-28 PROCEDURE — 36415 COLL VENOUS BLD VENIPUNCTURE: CPT | Performed by: NURSE PRACTITIONER

## 2022-08-28 PROCEDURE — 93010 ELECTROCARDIOGRAM REPORT: CPT | Performed by: EMERGENCY MEDICINE

## 2022-08-28 PROCEDURE — 71045 X-RAY EXAM CHEST 1 VIEW: CPT | Mod: 26 | Performed by: RADIOLOGY

## 2022-08-28 PROCEDURE — 74018 RADEX ABDOMEN 1 VIEW: CPT

## 2022-08-28 PROCEDURE — 85004 AUTOMATED DIFF WBC COUNT: CPT | Performed by: NURSE PRACTITIONER

## 2022-08-28 PROCEDURE — 99285 EMERGENCY DEPT VISIT HI MDM: CPT | Mod: 25 | Performed by: EMERGENCY MEDICINE

## 2022-08-28 RX ORDER — HYDROMORPHONE HYDROCHLORIDE 2 MG/1
2 TABLET ORAL ONCE
Status: COMPLETED | OUTPATIENT
Start: 2022-08-28 | End: 2022-08-28

## 2022-08-28 RX ORDER — SUCRALFATE ORAL 1 G/10ML
1 SUSPENSION ORAL ONCE
Status: COMPLETED | OUTPATIENT
Start: 2022-08-28 | End: 2022-08-28

## 2022-08-28 RX ADMIN — SUCRALFATE 1 G: 1 SUSPENSION ORAL at 23:52

## 2022-08-28 RX ADMIN — HYDROMORPHONE HYDROCHLORIDE 2 MG: 2 TABLET ORAL at 21:32

## 2022-08-28 ASSESSMENT — ACTIVITIES OF DAILY LIVING (ADL)
ADLS_ACUITY_SCORE: 40

## 2022-08-28 NOTE — ED TRIAGE NOTES
"ED Triage Provider Note  [facility]  Encounter Date: Aug 28, 2022    History:  Chief Complaint   Patient presents with     Hemoptysis     Chest Pain     Nevin Alvarado is a 31 year old female who presents to the ED with coughing up blood.  Patient reports in the middle of the night she woke up and coughed up about a quarter sized dark clot of blood.  After that she had difficulty breathing especially inhaling \"felt very restricted\".  Patient also reports new bilateral lower extremity swelling over the past couple days and a right lower extremity shin throbbing pain that started today.  Says she is currently coughing up pink frothy sputum.  Today has also been having pain in the left side of her chest mid axillary region. for the past week has had body aches and upper respiratory infection symptoms, took 1 COVID test which was negative.  Denies any trauma.  Was hospitalized 1 week ago for foreign body removal after intentional ingestion of a 15 cm wire from a mask.  Patient reports some residual left upper quadrant pain that has been persistent since that object was removed 1 week ago.  Patient denies swallowing anything today or in the past week.  Patient denies suicidal ideation or self-injurious behavior thoughts.    Review of Systems:  + chest pain, hemoptysis, shortness of breath     Exam:  BP (!) 140/94   Pulse 100   Temp 98.2  F (36.8  C) (Temporal)   Resp 16   LMP  (LMP Unknown)   SpO2 100%   General: No acute distress. Appears stated age.   Cardio: Regular rate, extremities well perfused  Resp: Normal work of breathing, grossly normal respiratory rate. Point tenderness midaxillary line left side.  Neuro: Alert. CN II-XII grossly intact. Grossly intact strength.   Abd: LUQ tenderness    Medical Decision Making:  Patient arriving to the ED with problem as above. A medical screening exam was performed. EKG, CBC, CMP, D-dimer, 1:1 sitter based on patient " care plan and prior history of swallowing multiple foreign bodies while in ED orders initiated from Triage. The patient is appropriate to be immediately roomed.      HU Ferrer CNP on 8/28/2022 at 6:58 PM

## 2022-08-29 VITALS
DIASTOLIC BLOOD PRESSURE: 88 MMHG | TEMPERATURE: 98.8 F | RESPIRATION RATE: 16 BRPM | OXYGEN SATURATION: 98 % | SYSTOLIC BLOOD PRESSURE: 142 MMHG | HEART RATE: 62 BPM

## 2022-08-29 LAB
ATRIAL RATE - MUSE: 90 BPM
DIASTOLIC BLOOD PRESSURE - MUSE: NORMAL MMHG
INTERPRETATION ECG - MUSE: NORMAL
P AXIS - MUSE: 32 DEGREES
PR INTERVAL - MUSE: 140 MS
QRS DURATION - MUSE: 76 MS
QT - MUSE: 338 MS
QTC - MUSE: 413 MS
R AXIS - MUSE: 65 DEGREES
SYSTOLIC BLOOD PRESSURE - MUSE: NORMAL MMHG
T AXIS - MUSE: 22 DEGREES
VENTRICULAR RATE- MUSE: 90 BPM

## 2022-08-29 RX ORDER — SUCRALFATE 1 G/1
1 TABLET ORAL 4 TIMES DAILY
Qty: 28 TABLET | Refills: 0 | Status: SHIPPED | OUTPATIENT
Start: 2022-08-29 | End: 2022-09-05

## 2022-08-29 NOTE — PROVIDER NOTIFICATION
Patient refusing giving up personal items. Writer explained risk and policy with her history of ingesting foreign objects. Patient still uncooperative. Provider notified and is in an emergent situation; will wait for provider to advise before further action.

## 2022-08-29 NOTE — ED PROVIDER NOTES
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 Bowel sounds are normal. There is no distension.      Palpations: Abdomen is soft.      Tenderness: There is no abdominal tenderness.   Musculoskeletal:         General: No tenderness or deformity. Normal range of motion.      Cervical back: Normal range of motion and neck supple.   Skin:     General: Skin is warm and dry.      Coloration: Skin is not pale.      Findings: No erythema or  rash.   Neurological:      Mental Status: She is alert and oriented to person, place, and time.      Cranial Nerves: No cranial nerve deficit.         ED Course      Procedures            EKG Interpretation:      Interpreted by Dejon Rodriguez DO  Time reviewed:2105   Symptoms at time of EKG: Shortness of breath  Rhythm: Normal sinus   Rate: 90  Axis: Normal  Ectopy: None  Conduction: Normal  ST Segments/ T Waves: No ST-T wave changes  Q Waves: None  Comparison to prior: No old EKG available    Clinical Impression: no acute changes                Results for orders placed or performed during the hospital encounter of 08/28/22   [facility]     Status: None (In process)    Narrative    The following orders were created for panel order [facility].  Procedure                               Abnormality         Status                     ---------                               -----------         ------                     Extra Red Top Tube[[id number]]                               In process                   Please view results for these tests on the individual orders.   CBC with platelets and differential     Status: None   Result Value Ref Range    WBC Count 7.8 4.0 - 11.0 10e3/uL    RBC Count 4.47 3.80 - 5.20 10e6/uL    Hemoglobin 13.4 11.7 - 15.7 g/dL    Hematocrit 40.1 35.0 - 47.0 %    MCV 90 78 - 100 fL    MCH 30.0 26.5 - 33.0 pg    MCHC 33.4 31.5 - 36.5 g/dL    RDW 13.2 10.0 - 15.0 %    Platelet Count 259 150 - 450 10e3/uL    % Neutrophils 65 %    % Lymphocytes 21 %    % Monocytes 10 %    % Eosinophils 3 %    % Basophils 1 %    % Immature Granulocytes 0 %    NRBCs per 100 WBC 0 <1 /100    Absolute Neutrophils 5.2 1.6 - 8.3 10e3/uL    Absolute Lymphocytes 1.6 0.8 - 5.3 10e3/uL    Absolute Monocytes 0.8 0.0 - 1.3 10e3/uL    Absolute Eosinophils 0.2 0.0 - 0.7 10e3/uL    Absolute Basophils 0.1 0.0 - 0.2 10e3/uL    Absolute Immature Granulocytes 0.0 <=0.4 10e3/uL    Absolute NRBCs 0.0 10e3/uL   EKG 12-lead,  tracing only     Status: None (Preliminary result)   Result Value Ref Range    Systolic Blood Pressure  mmHg    Diastolic Blood Pressure  mmHg    Ventricular Rate 90 BPM    Atrial Rate 90 BPM    AR Interval 140 ms    QRS Duration 76 ms     ms    QTc 413 ms    P Axis 32 degrees    R AXIS 65 degrees    T Axis 22 degrees    Interpretation ECG       Sinus rhythm  Cannot rule out Anterior infarct , age undetermined  Abnormal ECG     CBC with platelets differential     Status: None    Narrative    The following orders were created for panel order CBC with platelets differential.  Procedure                               Abnormality         Status                     ---------                               -----------         ------                     CBC with platelets and d...[[id number]]                      Final result                 Please view results for these tests on the individual orders.     Medications - No data to display     Assessments & Plan (with Medical Decision Making)   This is a 31-year-old female who presents with hemoptysis shortness of breath and upper abdominal pain.  Patient recently underwent EGD for removal of swallowed foreign body.  Exam demonstrates no acute abnormalities.  Patient has known uvular injury which appears to be well-healing.  Lab work shows no acute abnormalities.  D-dimer is not elevated.  X-rays of chest and abdomen show no acute abnormalities.  Patient was given hydromorphone and Carafate for pain.  Patient will be discharged with return precautions.    I have reviewed the nursing notes. I have reviewed the findings, diagnosis, plan and need for follow up with the patient.    New Prescriptions    No medications on file       Final diagnoses:   None       --  Dejon Rodriguez DO  [facility] EMERGENCY DEPARTMENT  8/28/2022     Dejon Rodriguez,   08/29/22 0310

## 2022-08-29 NOTE — ED NOTES
Bed: ED08  Expected date:   Expected time:   Means of arrival:   Comments:  Needs floor clean + a bed

## 2022-08-29 NOTE — PROGRESS NOTES
Provider allowing patient to keep objects with her in her room; writer will relocate to inside the room to further observe

## 2022-08-31 ENCOUNTER — APPOINTMENT (OUTPATIENT)
Dept: CT IMAGING | Facility: CLINIC | Age: 32
End: 2022-08-31
Attending: EMERGENCY MEDICINE
Payer: COMMERCIAL

## 2022-08-31 ENCOUNTER — HOSPITAL ENCOUNTER (EMERGENCY)
Facility: CLINIC | Age: 32
Discharge: HOME | End: 2022-08-31
Attending: EMERGENCY MEDICINE | Admitting: EMERGENCY MEDICINE
Payer: COMMERCIAL

## 2022-08-31 VITALS
HEART RATE: 102 BPM | OXYGEN SATURATION: 96 % | SYSTOLIC BLOOD PRESSURE: 137 MMHG | DIASTOLIC BLOOD PRESSURE: 83 MMHG | RESPIRATION RATE: 18 BRPM | TEMPERATURE: 98.1 F

## 2022-08-31 DIAGNOSIS — R06.02 SHORTNESS OF BREATH: ICD-10-CM

## 2022-08-31 LAB
ALBUMIN SERPL BCG-MCNC: 4.1 G/DL (ref 3.5–5.2)
ALP SERPL-CCNC: 72 U/L (ref 35–104)
ALT SERPL W P-5'-P-CCNC: 50 U/L (ref 10–35)
ANION GAP SERPL CALCULATED.3IONS-SCNC: 9 MMOL/L (ref 7–15)
AST SERPL W P-5'-P-CCNC: 35 U/L (ref 10–35)
ATRIAL RATE - MUSE: 96 BPM
BASOPHILS # BLD AUTO: 0 10E3/UL (ref 0–0.2)
BASOPHILS NFR BLD AUTO: 1 %
BILIRUB SERPL-MCNC: 0.3 MG/DL
BUN SERPL-MCNC: 10.7 MG/DL (ref 6–20)
CALCIUM SERPL-MCNC: 9.3 MG/DL (ref 8.6–10)
CHLORIDE SERPL-SCNC: 96 MMOL/L (ref 98–107)
CREAT SERPL-MCNC: 0.55 MG/DL (ref 0.51–0.95)
D DIMER PPP FEU-MCNC: 0.7 UG/ML FEU (ref 0–0.5)
DEPRECATED HCO3 PLAS-SCNC: 31 MMOL/L (ref 22–29)
DIASTOLIC BLOOD PRESSURE - MUSE: NORMAL MMHG
EOSINOPHIL # BLD AUTO: 0.3 10E3/UL (ref 0–0.7)
EOSINOPHIL NFR BLD AUTO: 4 %
ERYTHROCYTE [DISTWIDTH] IN BLOOD BY AUTOMATED COUNT: 13.2 % (ref 10–15)
GFR SERPL CREATININE-BSD FRML MDRD: >90 ML/MIN/1.73M2
GLUCOSE SERPL-MCNC: 140 MG/DL (ref 70–99)
HCT VFR BLD AUTO: 38.5 % (ref 35–47)
HEMOLYSIS: 30
HGB BLD-MCNC: 12.6 G/DL (ref 11.7–15.7)
ICTERUS: 0
IMM GRANULOCYTES # BLD: 0 10E3/UL
IMM GRANULOCYTES NFR BLD: 0 %
INTERPRETATION ECG - MUSE: NORMAL
LIPEMIA: 10
LYMPHOCYTES # BLD AUTO: 2.4 10E3/UL (ref 0.8–5.3)
LYMPHOCYTES NFR BLD AUTO: 35 %
MCH RBC QN AUTO: 29.3 PG (ref 26.5–33)
MCHC RBC AUTO-ENTMCNC: 32.7 G/DL (ref 31.5–36.5)
MCV RBC AUTO: 90 FL (ref 78–100)
MONOCYTES # BLD AUTO: 0.5 10E3/UL (ref 0–1.3)
MONOCYTES NFR BLD AUTO: 8 %
NEUTROPHILS # BLD AUTO: 3.5 10E3/UL (ref 1.6–8.3)
NEUTROPHILS NFR BLD AUTO: 52 %
NRBC # BLD AUTO: 0 10E3/UL
NRBC BLD AUTO-RTO: 0 /100
P AXIS - MUSE: 41 DEGREES
PLATELET # BLD AUTO: 236 10E3/UL (ref 150–450)
POTASSIUM SERPL-SCNC: 3.5 MMOL/L (ref 3.4–5.3)
PR INTERVAL - MUSE: 150 MS
PROT SERPL-MCNC: 6.7 G/DL (ref 6.4–8.3)
QRS DURATION - MUSE: 82 MS
QT - MUSE: 360 MS
QTC - MUSE: 454 MS
R AXIS - MUSE: 69 DEGREES
RBC # BLD AUTO: 4.3 10E6/UL (ref 3.8–5.2)
SARS-COV-2 RNA RESP QL NAA+PROBE: NEGATIVE
SODIUM SERPL-SCNC: 136 MMOL/L (ref 136–145)
SYSTOLIC BLOOD PRESSURE - MUSE: NORMAL MMHG
T AXIS - MUSE: 29 DEGREES
TROPONIN T SERPL HS-MCNC: 13 NG/L
TROPONIN T SERPL HS-MCNC: 15 NG/L
VENTRICULAR RATE- MUSE: 96 BPM
WBC # BLD AUTO: 6.7 10E3/UL (ref 4–11)

## 2022-08-31 PROCEDURE — 99285 EMERGENCY DEPT VISIT HI MDM: CPT | Mod: CS,25 | Performed by: EMERGENCY MEDICINE

## 2022-08-31 PROCEDURE — 94640 AIRWAY INHALATION TREATMENT: CPT | Performed by: EMERGENCY MEDICINE

## 2022-08-31 PROCEDURE — 250N000013 HC RX MED GY IP 250 OP 250 PS 637: Performed by: EMERGENCY MEDICINE

## 2022-08-31 PROCEDURE — 250N000009 HC RX 250: Performed by: EMERGENCY MEDICINE

## 2022-08-31 PROCEDURE — 99285 EMERGENCY DEPT VISIT HI MDM: CPT | Mod: CS | Performed by: EMERGENCY MEDICINE

## 2022-08-31 PROCEDURE — 93005 ELECTROCARDIOGRAM TRACING: CPT | Performed by: EMERGENCY MEDICINE

## 2022-08-31 PROCEDURE — 250N000011 HC RX IP 250 OP 636: Performed by: STUDENT IN AN ORGANIZED HEALTH CARE EDUCATION/TRAINING PROGRAM

## 2022-08-31 PROCEDURE — 84484 ASSAY OF TROPONIN QUANT: CPT | Performed by: EMERGENCY MEDICINE

## 2022-08-31 PROCEDURE — 250N000011 HC RX IP 250 OP 636: Performed by: EMERGENCY MEDICINE

## 2022-08-31 PROCEDURE — C9803 HOPD COVID-19 SPEC COLLECT: HCPCS | Performed by: EMERGENCY MEDICINE

## 2022-08-31 PROCEDURE — 71275 CT ANGIOGRAPHY CHEST: CPT | Mod: 26 | Performed by: RADIOLOGY

## 2022-08-31 PROCEDURE — 85025 COMPLETE CBC W/AUTO DIFF WBC: CPT | Performed by: EMERGENCY MEDICINE

## 2022-08-31 PROCEDURE — 80053 COMPREHEN METABOLIC PANEL: CPT | Performed by: EMERGENCY MEDICINE

## 2022-08-31 PROCEDURE — U0003 INFECTIOUS AGENT DETECTION BY NUCLEIC ACID (DNA OR RNA); SEVERE ACUTE RESPIRATORY SYNDROME CORONAVIRUS 2 (SARS-COV-2) (CORONAVIRUS DISEASE [COVID-19]), AMPLIFIED PROBE TECHNIQUE, MAKING USE OF HIGH THROUGHPUT TECHNOLOGIES AS DESCRIBED BY CMS-2020-01-R: HCPCS | Performed by: EMERGENCY MEDICINE

## 2022-08-31 PROCEDURE — 36415 COLL VENOUS BLD VENIPUNCTURE: CPT | Performed by: EMERGENCY MEDICINE

## 2022-08-31 PROCEDURE — 96374 THER/PROPH/DIAG INJ IV PUSH: CPT | Mod: 59 | Performed by: EMERGENCY MEDICINE

## 2022-08-31 PROCEDURE — 85379 FIBRIN DEGRADATION QUANT: CPT | Performed by: EMERGENCY MEDICINE

## 2022-08-31 PROCEDURE — 93010 ELECTROCARDIOGRAM REPORT: CPT | Performed by: EMERGENCY MEDICINE

## 2022-08-31 PROCEDURE — 71275 CT ANGIOGRAPHY CHEST: CPT

## 2022-08-31 RX ORDER — ONDANSETRON 2 MG/ML
4 INJECTION INTRAMUSCULAR; INTRAVENOUS ONCE
Status: COMPLETED | OUTPATIENT
Start: 2022-08-31 | End: 2022-08-31

## 2022-08-31 RX ORDER — IOPAMIDOL 755 MG/ML
77 INJECTION, SOLUTION INTRAVASCULAR ONCE
Status: COMPLETED | OUTPATIENT
Start: 2022-08-31 | End: 2022-08-31

## 2022-08-31 RX ORDER — IBUPROFEN 600 MG/1
600 TABLET, FILM COATED ORAL ONCE
Status: COMPLETED | OUTPATIENT
Start: 2022-08-31 | End: 2022-08-31

## 2022-08-31 RX ORDER — ACETAMINOPHEN 325 MG/1
975 TABLET ORAL ONCE
Status: COMPLETED | OUTPATIENT
Start: 2022-08-31 | End: 2022-08-31

## 2022-08-31 RX ORDER — ONDANSETRON 4 MG/1
4 TABLET, ORALLY DISINTEGRATING ORAL EVERY 8 HOURS PRN
Qty: 10 TABLET | Refills: 0 | Status: SHIPPED | OUTPATIENT
Start: 2022-08-31 | End: 2022-09-03

## 2022-08-31 RX ORDER — IPRATROPIUM BROMIDE AND ALBUTEROL SULFATE 2.5; .5 MG/3ML; MG/3ML
3 SOLUTION RESPIRATORY (INHALATION) ONCE
Status: COMPLETED | OUTPATIENT
Start: 2022-08-31 | End: 2022-08-31

## 2022-08-31 RX ADMIN — IPRATROPIUM BROMIDE AND ALBUTEROL SULFATE 3 ML: .5; 3 SOLUTION RESPIRATORY (INHALATION) at 06:03

## 2022-08-31 RX ADMIN — IOPAMIDOL 77 ML: 755 INJECTION, SOLUTION INTRAVENOUS at 10:00

## 2022-08-31 RX ADMIN — ONDANSETRON 4 MG: 2 INJECTION INTRAMUSCULAR; INTRAVENOUS at 07:25

## 2022-08-31 RX ADMIN — ACETAMINOPHEN 975 MG: 325 TABLET, FILM COATED ORAL at 10:54

## 2022-08-31 RX ADMIN — IBUPROFEN 600 MG: 600 TABLET ORAL at 12:19

## 2022-08-31 ASSESSMENT — ACTIVITIES OF DAILY LIVING (ADL)
ADLS_ACUITY_SCORE: 40

## 2022-08-31 NOTE — ED PROVIDER NOTES
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 hospital encounter of 08/31/22   EKG 12-lead, tracing only     Status: None (Preliminary result)   Result Value Ref Range    Systolic Blood Pressure  mmHg    Diastolic Blood Pressure  mmHg    Ventricular Rate 96 BPM    Atrial Rate 96 BPM    HI Interval 150 ms    QRS Duration 82 ms     ms    QTc 454 ms    P Axis 41 degrees    R AXIS 69 degrees    T Axis 29 degrees    Interpretation ECG  Sinus rhythm  Normal ECG      CBC with platelets differential     Status: None (In process)    Narrative    The following orders were created for panel order CBC with platelets differential.  Procedure                               Abnormality         Status                     ---------                               -----------         ------                     CBC with platelets and d...[[id number]]                      In process                   Please view results for these tests on the individual orders.     Medications   ipratropium - albuterol 0.5 mg/2.5 mg/3 mL (DUONEB) neb solution 3 mL (has no administration in time range)        Assessments & Plan (with Medical Decision Making)   MDM  Patient presenting with SOB.  Differential includes asthma exacerbation, COVID, pneumonia, pneumothorax.  Less likely heart failure, no signs of edema, no history of heart disease.  Unlikely ACS.  EKG is normal.  We will check a single troponin.  D-dimer sent as she is tachycardic with pleuritic chest pain, will get a CT scan if this is positive.  Chest x-ray pending.  Vitals otherwise appears stable, she does appear quite well and nontoxic.    Re eval: D dimer elevated, will get PE scan. Patient will be signed out to oncoming physician pending imaging/workup.    I have reviewed the nursing notes. I have reviewed the findings, diagnosis, plan and need for follow up with the patient.    New Prescriptions    No medications on file       Final diagnoses:   Shortness of breath       --  Dick Lovell  [facility] EMERGENCY DEPARTMENT  8/31/2022     Dick Lovell MD  08/31/22 0680

## 2022-08-31 NOTE — ED NOTES
2:27 PM is a 31-year-old female seen by my partner last evening and signed out she presented with shortness of breath basically her work-up is normal and has been extensive including a CT of the chest for pulmonary embolism which is negative.  She has had a cough with minimal bronchospasm.  She does have a neb machine at home I will approve up to 2 nebs a day for her group home and she requested something for nausea I will give a prescription for Zofran.  She has an appoint with a primary care provider in 3 weeks.  On my exam before discharge her lungs are grossly clear saturations were normal and she was in no distress.     Estrada Bee MD  08/31/22 [id number]

## 2022-08-31 NOTE — DISCHARGE INSTRUCTIONS
No evidence for pneumonia or blood clot in the lungs (pulmonary embolism)  Follow-up with your primary care provider  Okay to use your nebulizer up to 2 times daily

## 2022-08-31 NOTE — ED TRIAGE NOTES
Productive cough for one week. SOB, Ches ttightness last night. Called ems. Given neb.      Triage Assessment     Row Name 08/31/22 0537       Triage Assessment (Adult)    Airway WDL WDL    Additional Documentation Breath Sounds (Group)       Respiratory WDL    Respiratory WDL cough    Cough Frequency frequent    Cough Type productive       Breath Sounds    Breath Sounds All Fields    All Lung Fields Breath Sounds diminished       Skin Circulation/Temperature WDL    Skin Circulation/Temperature WDL WDL       Cardiac WDL    Cardiac WDL X  general chest tightness        Peripheral/Neurovascular WDL    Peripheral Neurovascular WDL WDL       Cognitive/Neuro/Behavioral WDL    Cognitive/Neuro/Behavioral WDL WDL

## 2022-09-03 ENCOUNTER — NURSE TRIAGE (OUTPATIENT)
Dept: NURSING | Facility: CLINIC | Age: 32
End: 2022-09-03

## 2022-09-04 ENCOUNTER — HOSPITAL ENCOUNTER (EMERGENCY)
Facility: CLINIC | Age: 32
Discharge: HOME | End: 2022-09-04
Admitting: PHYSICIAN ASSISTANT
Payer: COMMERCIAL

## 2022-09-04 VITALS
BODY MASS INDEX: 53.92 KG/M2 | RESPIRATION RATE: 16 BRPM | HEART RATE: 96 BPM | TEMPERATURE: 98.5 F | SYSTOLIC BLOOD PRESSURE: 138 MMHG | OXYGEN SATURATION: 96 % | HEIGHT: 62 IN | DIASTOLIC BLOOD PRESSURE: 82 MMHG | WEIGHT: 293 LBS

## 2022-09-04 DIAGNOSIS — M54.9 MUSCULOSKELETAL BACK PAIN: ICD-10-CM

## 2022-09-04 DIAGNOSIS — M54.50 LUMBAGO: ICD-10-CM

## 2022-09-04 DIAGNOSIS — J45.901 EXACERBATION OF ASTHMA, UNSPECIFIED ASTHMA SEVERITY, UNSPECIFIED WHETHER PERSISTENT: ICD-10-CM

## 2022-09-04 LAB
ALBUMIN UR-MCNC: NEGATIVE MG/DL
APPEARANCE UR: CLEAR
BILIRUB UR QL STRIP: NEGATIVE
COLOR UR AUTO: ABNORMAL
GLUCOSE UR STRIP-MCNC: NEGATIVE MG/DL
HGB UR QL STRIP: NEGATIVE
KETONES UR STRIP-MCNC: NEGATIVE MG/DL
LEUKOCYTE ESTERASE UR QL STRIP: NEGATIVE
MUCOUS THREADS #/AREA URNS LPF: PRESENT /LPF
NITRATE UR QL: NEGATIVE
PH UR STRIP: 5.5 [PH] (ref 5–7)
RBC URINE: 1 /HPF
SP GR UR STRIP: 1.01 (ref 1–1.03)
UROBILINOGEN UR STRIP-MCNC: NORMAL MG/DL
WBC URINE: <1 /HPF

## 2022-09-04 PROCEDURE — 81001 URINALYSIS AUTO W/SCOPE: CPT | Performed by: INTERNAL MEDICINE

## 2022-09-04 PROCEDURE — 99283 EMERGENCY DEPT VISIT LOW MDM: CPT

## 2022-09-04 PROCEDURE — 99282 EMERGENCY DEPT VISIT SF MDM: CPT | Performed by: PHYSICIAN ASSISTANT

## 2022-09-04 RX ORDER — PREDNISONE 20 MG/1
40 TABLET ORAL DAILY
Qty: 10 TABLET | Refills: 0 | Status: SHIPPED | OUTPATIENT
Start: 2022-09-04 | End: 2022-09-09

## 2022-09-04 ASSESSMENT — ACTIVITIES OF DAILY LIVING (ADL): ADLS_ACUITY_SCORE: 40

## 2022-09-04 NOTE — TELEPHONE ENCOUNTER
Nurse Triage SBAR    Situation:  Back/flank, right sided pain. Started 1 month ago. Comes and goes. Now back again.     Background: Patient, Nevin, romario. Consent: not needed.    Assessment:  Using a heating pad with no relief. Pain is constant. She rates it a 7/10 on the pain scale.  No fever.  Urine is dark yellow despite drinking a lot of water today. Nausea off and on, but no vomiting. Feels full fast and nauseated with eating.     Protocol Recommended Disposition: See Physician within 24 hours    Recommendation: According to the protocol, Patient should be seen within 24 hours. Advised Patient to be seen within 24 hours. I advised her to be seen in urgent care tomorrow, or if pain becomes severe, she has a fever, vomiting, then be seen sooner in the ED.  Care advice given. Patient verbalizes understanding and agrees with plan of care. Reviewed concerning symptoms and when to call back.  She verbalized understanding.      Melody Hobson RN  [facility] Nurse Advisor  9/3/2022 at 7:11 PM        Reason for Disposition    MODERATE pain (e.g., interferes with normal activities or awakens from sleep)    Additional Information    Negative: Passed out (i.e., lost consciousness, collapsed and was not responding)    Negative: Shock suspected (e.g., cold/pale/clammy skin, too weak to stand, low BP, rapid pulse)    Negative: Difficult to awaken or acting confused (e.g., disoriented, slurred speech)    Negative: Sounds like a life-threatening emergency to the triager    Negative: Followed a major injury to the back (e.g., MVA, fall > 10 feet or 3 meters, penetrating injury, etc.)    Negative: Back pain or flank pain during pregnancy    Negative: Upper, mid or lower back pain that occurs mainly in the midline    Negative: [1] SEVERE pain (e.g., excruciating, scale 8-10) AND [2] present > 1 hour    Negative: [1] SEVERE pain (e.g., excruciating, scale 8-10) AND [2] not improved after pain medicine    Negative: [1]  Sudden onset of severe flank pain AND [2] age > 60 years    Negative: [1] Abdominal pain AND [2] age > 60 years    Negative: [1] Unable to urinate (or only a few drops) > 4 hours AND [2] bladder feels very full (e.g., palpable bladder or strong urge to urinate)    Negative: Vomiting    Negative: Weakness of a leg or foot (e.g., unable to bear weight, dragging foot)    Negative: Patient sounds very sick or weak to the triager    Negative: Fever > 100.4 F (38.0 C)    Negative: Pain or burning with passing urine (urination)    Protocols used: FLANK PAIN-A-AH

## 2022-09-05 NOTE — ED PROVIDER NOTES
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 systems was performed with pertinent positives and negatives noted in the HPI, and all other systems negative.    Physical Exam   BP: 138/82  Pulse: 93  Temp: 98.5  F (36.9  C)  Resp: 16  Height: 157.5 cm (5' 2\")  Weight: 135.2 kg (298 lb)  SpO2: 94 %  Physical Exam    GENERAL APPEARANCE: The patient is well developed, well appearing, and in no acute distress.  HEAD:  " Normocephalic and atraumatic.   EENT: Voice normal.  NECK: Trachea is midline.No lymphadenopathy or tenderness.  LUNGS: Breath sounds are equal and clear bilaterally. No wheezes, rhonchi, or rales.  HEART: Regular rate and normal rhythm.  Radial pulses 2+ bilaterally.  ABDOMEN: Soft, flat, and benign. No mass, tenderness, guarding, or rebound.Bowel sounds are present.  EXTREMITIES: No cyanosis, clubbing, or edema.  Palpation at midline of cervical thoracic and lumbar spine elicits no tenderness.  Palpation of the paraspinal muscles and lumbar spine elicits no tenderness.  NEUROLOGIC: No focal sensory or motor deficits are noted.  Gross sensation intact in lower extremities bilaterally.  Resisted plantar flexion dorsiflexion 5 out of 5 bilaterally.  Negative straight leg raise while seated bilaterally.  PSYCHIATRIC: The patient is awake, alert.  Appropriate mood and affect.  SKIN: Warm, dry, and well perfused. Good turgor.      ED Course        Results for orders placed or performed during the hospital encounter of 09/04/22   UA with Microscopic reflex to Culture     Status: Abnormal    Specimen: Urine, Midstream   Result Value Ref Range    Color Urine Light Yellow Colorless, Straw, Light Yellow, Yellow    Appearance Urine Clear Clear    Glucose Urine Negative Negative mg/dL    Bilirubin Urine Negative Negative    Ketones Urine Negative Negative mg/dL    Specific Gravity Urine 1.015 1.003 - 1.035    Blood Urine Negative Negative    pH Urine 5.5 5.0 - 7.0    Protein Albumin Urine Negative Negative mg/dL    Urobilinogen Urine Normal Normal, 2.0 mg/dL    Nitrite Urine Negative Negative    Leukocyte Esterase Urine Negative Negative    Mucus Urine Present (A) None Seen /LPF    RBC Urine 1 <=2 /HPF    WBC Urine <1 <=5 /HPF    Narrative    Urine Culture not indicated     Medications - No data to display     Assessments & Plan (with Medical Decision Making)   This is a 31-year-old female with extensive past medical history  presenting with concerns for low back pain after prior trauma several days ago in addition to ongoing shortness of breath, chest pain with coughing, productive cough for which she is already been evaluated for several days ago.    On presentation patient has stable vital signs, initially oxygenation 94 I did check this at bedside, 96 on room air.  Patient has clear breath sounds, without any audible wheezing.  In regards to her cough, she continues to have shortness of breath and chest discomfort after thorough evaluation in the emergency department including chest CT scan.  Patient does have a history of PE, and I did discuss this with her based on her clinical presentation, history of asthma, this appears more likely to explain her symptoms.  Discussed with her low clinical suspicion of PE, ACS with her continued to have difficulties, known history of asthma, reassuring evaluation just several days ago.  I do not feel additional evaluation today is necessary.  Patient is actually well, afebrile, has clear breath sounds, symptoms not consistent with a pneumonia type picture.  She is already had a negative COVID test and symptom onset, and is outside the treatment duration for outpatient COVID treatment.  I did discuss with her recommendations to start her on prednisone burst course for treatment of asthma exacerbation.  This was sent to her preferred pharmacy.    In regards to her back pain, patient has no focal tenderness to palpation at midline or in the vicinity of the paraspinals of the low back.  She is able to ambulate with her walker.  She has no red flag signs suggestive of cauda equina, epidural abscess, or other emergent findings.  Urinalysis was obtained from triage which returns no findings suggestive of infectious process, or hematuria.  Patient is not having any urinary symptoms on her ROS.  I discussed with her symptoms most consistent with musculoskeletal low back pain and contusion, and that with  her associated numbness in the associated area, she may have irritated some of the corresponding nerves.  This does not appear consistent with a radiculopathy type picture.  I discussed with her low clinical suspicion of fracture, and do not feel that radiographs at this time would be beneficial. I did recommend trying conservative measures including trying Tylenol, heat, ice.  Patient does request a prescription for Voltaren as I discussed with her that this may also be helpful and I did send a prescription to her preferred pharmacy.  We discussed muscle relaxants as well, patient states that these make her feel sleepy and she would not like to have them.    Patient has an upcoming appoint with her PCP in a several weeks, I discussed with her the importance of tenting this visit.  We also discussed red flag signs for which you need to return immediately to the ED for further evaluation and management.    Patient has no other questions or concerns at this time.  Red flag signs were addressed, and they were in agreement with the patient care plan provided.    I have reviewed the nursing notes. I have reviewed the findings, diagnosis, plan and need for follow up with the patient.    New Prescriptions    DICLOFENAC (VOLTAREN) 1 % TOPICAL GEL    Apply 2 g topically 4 times daily for 14 days    PREDNISONE (DELTASONE) 20 MG TABLET    Take 2 tablets (40 mg) by mouth daily for 5 days       Final diagnoses:   Musculoskeletal back pain   Exacerbation of asthma, unspecified asthma severity, unspecified whether persistent       --  Niru Paulo, PAC  [facility] EMERGENCY DEPARTMENT  9/4/2022

## 2022-09-05 NOTE — DISCHARGE INSTRUCTIONS
Here in the emergency department, have reassuring evaluation today.  He has stable vital signs.  Physical exam shows no tenderness to your back in the middle of the back where your bones are.  He has clear breath sounds, have already been evaluated for your shortness of breath difficulties, continuing to persist.    We discussed that your back pain does appear consistent with musculoskeletal low back pain.  We discussed that this should improve within the next several days with use of heat, ice, Tylenol, Biofreeze.  Prescription for Voltaren gel was sent to your preferred pharmacy.    In addition, with reporting some shortness of breath, ongoing cough, reassuring evaluation several days ago in the emergency department, this does appear to be consistent with a worsening of your asthma.  We discussed continue your nebulizers as prescribed, and I did send a prescription for burst course of prednisone to your preferred pharmacy.  I would recommend starting this tomorrow, taking this with food, and avoiding ibuprofen while taking this medicine.    We discussed importance of following up with primary care, he should keep appointment that is scheduled for you.  Patient has no other questions or concerns at this time.  Red flag signs were addressed, and they were in agreement with the patient care plan provided.

## 2022-09-05 NOTE — ED TRIAGE NOTES
Pt presents to ER with C/O low back pain, numbness with urinary urgency after falling down 2 steps and landing on her butt. No radiation of pain. Taking Tylenol and Ibuprofen without improvement.      Triage Assessment     Row Name 09/04/22 1927       Triage Assessment (Adult)    Airway WDL WDL       Respiratory WDL    Respiratory WDL WDL       Skin Circulation/Temperature WDL    Skin Circulation/Temperature WDL WDL       Cardiac WDL    Cardiac WDL WDL       Peripheral/Neurovascular WDL    Peripheral Neurovascular WDL WDL  low back numbness       Cognitive/Neuro/Behavioral WDL    Cognitive/Neuro/Behavioral WDL WDL

## 2022-09-09 ENCOUNTER — TELEPHONE (OUTPATIENT)
Dept: OTOLARYNGOLOGY | Facility: CLINIC | Age: 32
End: 2022-09-09

## 2022-09-09 NOTE — TELEPHONE ENCOUNTER
Contacted patient to schedule follow up appointment. Pt asked writer to call back in one hour due to being on the phone with post office. Writer will call back.

## 2022-09-09 NOTE — TELEPHONE ENCOUNTER
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   - Follow-up with laryngologist in 3-4 weeks for evaluation of the left vocal cord immobility (we will coordinate)  This was per Soniya Díaz MD      It doesn't look like anything was coordinated - the only thing that was set up was a voice eval w/ Anjali Kim, SLP on 10/11      Please advise     Action Taken: Message routed to:  [facility] ([facility] ENT    Travel Screening: Not Applicable

## 2022-09-13 ENCOUNTER — TELEPHONE (OUTPATIENT)
Dept: OTOLARYNGOLOGY | Facility: CLINIC | Age: 32
End: 2022-09-13

## 2022-09-13 NOTE — TELEPHONE ENCOUNTER
Writer called patient to offer an appointment spot on October 24th at 11a. Patient confirmed the appointment would work. Writer will send a follow up [employer] message for the patient that will include appointment details as well as writer's direct number.

## 2022-09-16 ENCOUNTER — APPOINTMENT (OUTPATIENT)
Dept: GENERAL RADIOLOGY | Facility: CLINIC | Age: 32
End: 2022-09-16
Attending: EMERGENCY MEDICINE
Payer: COMMERCIAL

## 2022-09-16 ENCOUNTER — HOSPITAL ENCOUNTER (OUTPATIENT)
Facility: CLINIC | Age: 32
Setting detail: OBSERVATION
Discharge: HOME | End: 2022-09-17
Attending: EMERGENCY MEDICINE | Admitting: INTERNAL MEDICINE
Payer: COMMERCIAL

## 2022-09-16 DIAGNOSIS — T18.9XXA SWALLOWED FOREIGN BODY, INITIAL ENCOUNTER: ICD-10-CM

## 2022-09-16 DIAGNOSIS — Z11.52 ENCOUNTER FOR SCREENING LABORATORY TESTING FOR SEVERE ACUTE RESPIRATORY SYNDROME CORONAVIRUS 2 (SARS-COV-2): ICD-10-CM

## 2022-09-16 PROCEDURE — C9803 HOPD COVID-19 SPEC COLLECT: HCPCS

## 2022-09-16 PROCEDURE — 99285 EMERGENCY DEPT VISIT HI MDM: CPT | Mod: 25

## 2022-09-16 PROCEDURE — 96361 HYDRATE IV INFUSION ADD-ON: CPT

## 2022-09-16 PROCEDURE — 96374 THER/PROPH/DIAG INJ IV PUSH: CPT

## 2022-09-16 PROCEDURE — U0003 INFECTIOUS AGENT DETECTION BY NUCLEIC ACID (DNA OR RNA); SEVERE ACUTE RESPIRATORY SYNDROME CORONAVIRUS 2 (SARS-COV-2) (CORONAVIRUS DISEASE [COVID-19]), AMPLIFIED PROBE TECHNIQUE, MAKING USE OF HIGH THROUGHPUT TECHNOLOGIES AS DESCRIBED BY CMS-2020-01-R: HCPCS | Performed by: EMERGENCY MEDICINE

## 2022-09-16 PROCEDURE — 74019 RADEX ABDOMEN 2 VIEWS: CPT

## 2022-09-16 PROCEDURE — 71046 X-RAY EXAM CHEST 2 VIEWS: CPT

## 2022-09-16 PROCEDURE — 71046 X-RAY EXAM CHEST 2 VIEWS: CPT | Mod: 26 | Performed by: RADIOLOGY

## 2022-09-16 PROCEDURE — 74019 RADEX ABDOMEN 2 VIEWS: CPT | Mod: 26 | Performed by: RADIOLOGY

## 2022-09-16 PROCEDURE — G0378 HOSPITAL OBSERVATION PER HR: HCPCS

## 2022-09-16 PROCEDURE — 99285 EMERGENCY DEPT VISIT HI MDM: CPT | Performed by: EMERGENCY MEDICINE

## 2022-09-16 ASSESSMENT — ENCOUNTER SYMPTOMS
SHORTNESS OF BREATH: 0
ABDOMINAL PAIN: 1
NECK PAIN: 0
CHOKING: 0
SORE THROAT: 0

## 2022-09-16 ASSESSMENT — ACTIVITIES OF DAILY LIVING (ADL)
ADLS_ACUITY_SCORE: 40
ADLS_ACUITY_SCORE: 40

## 2022-09-17 ENCOUNTER — ANESTHESIA EVENT (OUTPATIENT)
Dept: SURGERY | Facility: CLINIC | Age: 32
End: 2022-09-17
Payer: COMMERCIAL

## 2022-09-17 ENCOUNTER — ANESTHESIA (OUTPATIENT)
Dept: SURGERY | Facility: CLINIC | Age: 32
End: 2022-09-17
Payer: COMMERCIAL

## 2022-09-17 VITALS
HEART RATE: 93 BPM | HEIGHT: 62 IN | SYSTOLIC BLOOD PRESSURE: 145 MMHG | RESPIRATION RATE: 16 BRPM | DIASTOLIC BLOOD PRESSURE: 80 MMHG | OXYGEN SATURATION: 95 % | BODY MASS INDEX: 53.92 KG/M2 | TEMPERATURE: 96 F | WEIGHT: 293 LBS

## 2022-09-17 LAB
SARS-COV-2 RNA RESP QL NAA+PROBE: NEGATIVE
UPPER GI ENDOSCOPY: NORMAL

## 2022-09-17 PROCEDURE — 250N000025 HC SEVOFLURANE, PER MIN: Performed by: INTERNAL MEDICINE

## 2022-09-17 PROCEDURE — 999N000141 HC STATISTIC PRE-PROCEDURE NURSING ASSESSMENT: Performed by: INTERNAL MEDICINE

## 2022-09-17 PROCEDURE — 258N000003 HC RX IP 258 OP 636: Performed by: EMERGENCY MEDICINE

## 2022-09-17 PROCEDURE — 360N000082 HC SURGERY LEVEL 2 W/ FLUORO, PER MIN: Performed by: INTERNAL MEDICINE

## 2022-09-17 PROCEDURE — 272N000001 HC OR GENERAL SUPPLY STERILE: Performed by: INTERNAL MEDICINE

## 2022-09-17 PROCEDURE — 710N000010 HC RECOVERY PHASE 1, LEVEL 2, PER MIN: Performed by: INTERNAL MEDICINE

## 2022-09-17 PROCEDURE — 250N000011 HC RX IP 250 OP 636: Performed by: ANESTHESIOLOGY

## 2022-09-17 PROCEDURE — 250N000009 HC RX 250: Performed by: INTERNAL MEDICINE

## 2022-09-17 PROCEDURE — 250N000011 HC RX IP 250 OP 636: Performed by: STUDENT IN AN ORGANIZED HEALTH CARE EDUCATION/TRAINING PROGRAM

## 2022-09-17 PROCEDURE — 250N000009 HC RX 250: Performed by: STUDENT IN AN ORGANIZED HEALTH CARE EDUCATION/TRAINING PROGRAM

## 2022-09-17 PROCEDURE — G0378 HOSPITAL OBSERVATION PER HR: HCPCS

## 2022-09-17 PROCEDURE — 370N000017 HC ANESTHESIA TECHNICAL FEE, PER MIN: Performed by: INTERNAL MEDICINE

## 2022-09-17 PROCEDURE — 258N000003 HC RX IP 258 OP 636: Performed by: STUDENT IN AN ORGANIZED HEALTH CARE EDUCATION/TRAINING PROGRAM

## 2022-09-17 RX ORDER — DEXMEDETOMIDINE HYDROCHLORIDE 4 UG/ML
INJECTION, SOLUTION INTRAVENOUS PRN
Status: DISCONTINUED | OUTPATIENT
Start: 2022-09-17 | End: 2022-09-17

## 2022-09-17 RX ORDER — DIPHENHYDRAMINE HYDROCHLORIDE 50 MG/ML
25 INJECTION INTRAMUSCULAR; INTRAVENOUS ONCE
Status: COMPLETED | OUTPATIENT
Start: 2022-09-17 | End: 2022-09-17

## 2022-09-17 RX ORDER — ONDANSETRON 2 MG/ML
4 INJECTION INTRAMUSCULAR; INTRAVENOUS EVERY 30 MIN PRN
Status: DISCONTINUED | OUTPATIENT
Start: 2022-09-17 | End: 2022-09-17 | Stop reason: HOSPADM

## 2022-09-17 RX ORDER — HYDROMORPHONE HCL IN WATER/PF 6 MG/30 ML
0.2 PATIENT CONTROLLED ANALGESIA SYRINGE INTRAVENOUS EVERY 5 MIN PRN
Status: DISCONTINUED | OUTPATIENT
Start: 2022-09-17 | End: 2022-09-17 | Stop reason: HOSPADM

## 2022-09-17 RX ORDER — SODIUM CHLORIDE, SODIUM LACTATE, POTASSIUM CHLORIDE, CALCIUM CHLORIDE 600; 310; 30; 20 MG/100ML; MG/100ML; MG/100ML; MG/100ML
INJECTION, SOLUTION INTRAVENOUS CONTINUOUS
Status: DISCONTINUED | OUTPATIENT
Start: 2022-09-17 | End: 2022-09-17 | Stop reason: HOSPADM

## 2022-09-17 RX ORDER — ONDANSETRON 4 MG/1
4 TABLET, ORALLY DISINTEGRATING ORAL EVERY 30 MIN PRN
Status: DISCONTINUED | OUTPATIENT
Start: 2022-09-17 | End: 2022-09-17 | Stop reason: HOSPADM

## 2022-09-17 RX ORDER — NALOXONE HYDROCHLORIDE 0.4 MG/ML
0.4 INJECTION, SOLUTION INTRAMUSCULAR; INTRAVENOUS; SUBCUTANEOUS
Status: DISCONTINUED | OUTPATIENT
Start: 2022-09-17 | End: 2022-09-17 | Stop reason: HOSPADM

## 2022-09-17 RX ORDER — NALOXONE HYDROCHLORIDE 0.4 MG/ML
0.2 INJECTION, SOLUTION INTRAMUSCULAR; INTRAVENOUS; SUBCUTANEOUS
Status: DISCONTINUED | OUTPATIENT
Start: 2022-09-17 | End: 2022-09-17 | Stop reason: HOSPADM

## 2022-09-17 RX ORDER — SODIUM CHLORIDE 9 MG/ML
INJECTION, SOLUTION INTRAVENOUS CONTINUOUS
Status: DISCONTINUED | OUTPATIENT
Start: 2022-09-17 | End: 2022-09-17 | Stop reason: HOSPADM

## 2022-09-17 RX ORDER — HALOPERIDOL 5 MG/ML
1 INJECTION INTRAMUSCULAR
Status: DISCONTINUED | OUTPATIENT
Start: 2022-09-17 | End: 2022-09-17 | Stop reason: HOSPADM

## 2022-09-17 RX ORDER — LIDOCAINE 40 MG/G
CREAM TOPICAL
Status: DISCONTINUED | OUTPATIENT
Start: 2022-09-17 | End: 2022-09-17 | Stop reason: HOSPADM

## 2022-09-17 RX ORDER — DEXAMETHASONE SODIUM PHOSPHATE 4 MG/ML
INJECTION, SOLUTION INTRA-ARTICULAR; INTRALESIONAL; INTRAMUSCULAR; INTRAVENOUS; SOFT TISSUE PRN
Status: DISCONTINUED | OUTPATIENT
Start: 2022-09-17 | End: 2022-09-17

## 2022-09-17 RX ORDER — PROPOFOL 10 MG/ML
INJECTION, EMULSION INTRAVENOUS PRN
Status: DISCONTINUED | OUTPATIENT
Start: 2022-09-17 | End: 2022-09-17

## 2022-09-17 RX ORDER — FLUMAZENIL 0.1 MG/ML
0.2 INJECTION, SOLUTION INTRAVENOUS
Status: DISCONTINUED | OUTPATIENT
Start: 2022-09-17 | End: 2022-09-17 | Stop reason: HOSPADM

## 2022-09-17 RX ORDER — LIDOCAINE HYDROCHLORIDE 20 MG/ML
INJECTION, SOLUTION INFILTRATION; PERINEURAL PRN
Status: DISCONTINUED | OUTPATIENT
Start: 2022-09-17 | End: 2022-09-17

## 2022-09-17 RX ADMIN — SODIUM CHLORIDE 125 ML/HR: 9 INJECTION, SOLUTION INTRAVENOUS at 09:17

## 2022-09-17 RX ADMIN — SODIUM CHLORIDE 1000 ML: 9 INJECTION, SOLUTION INTRAVENOUS at 06:56

## 2022-09-17 RX ADMIN — PROPOFOL 300 MG: 10 INJECTION, EMULSION INTRAVENOUS at 08:27

## 2022-09-17 RX ADMIN — LIDOCAINE HYDROCHLORIDE 100 MG: 20 INJECTION, SOLUTION INFILTRATION; PERINEURAL at 08:27

## 2022-09-17 RX ADMIN — PHENYLEPHRINE HYDROCHLORIDE 100 MCG: 10 INJECTION INTRAVENOUS at 08:54

## 2022-09-17 RX ADMIN — SUCCINYLCHOLINE CHLORIDE 200 MG: 20 INJECTION, SOLUTION INTRAMUSCULAR; INTRAVENOUS; PARENTERAL at 08:32

## 2022-09-17 RX ADMIN — PHENYLEPHRINE HYDROCHLORIDE 100 MCG: 10 INJECTION INTRAVENOUS at 08:43

## 2022-09-17 RX ADMIN — DEXAMETHASONE SODIUM PHOSPHATE 8 MG: 4 INJECTION, SOLUTION INTRA-ARTICULAR; INTRALESIONAL; INTRAMUSCULAR; INTRAVENOUS; SOFT TISSUE at 08:36

## 2022-09-17 RX ADMIN — ONDANSETRON 4 MG: 2 INJECTION INTRAMUSCULAR; INTRAVENOUS at 09:00

## 2022-09-17 RX ADMIN — DIPHENHYDRAMINE HYDROCHLORIDE 25 MG: 50 INJECTION, SOLUTION INTRAMUSCULAR; INTRAVENOUS at 09:23

## 2022-09-17 RX ADMIN — DEXMEDETOMIDINE 24 MCG: 100 INJECTION, SOLUTION, CONCENTRATE INTRAVENOUS at 08:29

## 2022-09-17 RX ADMIN — PHENYLEPHRINE HYDROCHLORIDE 300 MCG: 10 INJECTION INTRAVENOUS at 08:39

## 2022-09-17 RX ADMIN — MIDAZOLAM 2 MG: 1 INJECTION INTRAMUSCULAR; INTRAVENOUS at 08:07

## 2022-09-17 ASSESSMENT — LIFESTYLE VARIABLES: TOBACCO_USE: 0

## 2022-09-17 ASSESSMENT — ACTIVITIES OF DAILY LIVING (ADL)
ADLS_ACUITY_SCORE: 40

## 2022-09-17 NOTE — ED TRIAGE NOTES
"Pt BIBA after swallowing the metal of her face mask. Says its a \"compulsion I struggle with\". Denies SI. No difficulty swallowing or breathing. Having mild throat and upper abdominal discomfort.       "

## 2022-09-17 NOTE — ED TRIAGE NOTES
BIBA   Patient Swallow metal face mask piece   VSS  Esophagus pain and abdominal pain   Patient not suicidal per EMS just PICA

## 2022-09-17 NOTE — ED PROVIDER NOTES
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 is protuberant. There is no distension.      Palpations: There is no mass.      Tenderness: There is abdominal tenderness (mild) in the left upper quadrant. There is no guarding or rebound.   Musculoskeletal:         General: No deformity or signs of injury. Normal range of motion.      Cervical back: Normal range of motion and neck supple. No rigidity.   Skin:     General: Skin is warm and dry.      Coloration: Skin is not " jaundiced or pale.   Neurological:      General: No focal deficit present.      Mental Status: She is alert and oriented to person, place, and time.   Psychiatric:         Attention and Perception: Attention normal.         Mood and Affect: Mood and affect normal.         Speech: Speech normal.         Behavior: Behavior normal. Behavior is cooperative.         ED Course      Procedures       -----  Observation Addendum  With this Addendum, this ED Provider Note may also serve as an Observation H&P    Observation Initiation Date: Sep 16, 2022    The patient's condition of foreign body ingestion would benefit from an observation stay for further monitoring and treatment. The observation plan includes monitoring overnight until EGD in AM, serial assessments of the patient's condition, and further disposition pending the patient's course during the monitoring period.    The patient's outpatient medications were not reconciled and ordered as she needs to be NPO.   -----    Mental Health Risk Assessment      PSS-3    Date and Time Over the past 2 weeks have you felt down, depressed, or hopeless? Over the past 2 weeks have you had thoughts of killing yourself? Have you ever attempted to kill yourself? When did this last happen? User   09/16/22 2017 no no yes more than 6 months ago [facility]                Item Assessment   Suicidal Ideation None   Plan NA   Intent NA   Suicidal or self-harm behaviors NA   Risk Factors NA   Protective Factors NA        Results for orders placed or performed during the hospital encounter of 09/16/22   XR Abdomen 2 Views     Status: None    Narrative    EXAM: XR ABDOMEN 2 VIEWS  LOCATION: [facility]  DATE/TIME: 9/16/2022 9:41 PM    INDICATION: swallowed foreign body  Wire from face mask  COMPARISON: 08/28/2022      Impression    IMPRESSION: There is a 10 cm linear metallic foreign body overlying the left upper quadrant in an oblique orientation. The  inferior 1 cm is folded upon itself. Given history, findings consistent with a swallowed wire in the stomach.    No free air. Bowel gas pattern is unremarkable.     Prior cholecystectomy. There is a mild S-shaped thoracolumbar scoliosis.   XR Chest 2 Views     Status: None    Narrative    EXAM: XR CHEST 2 VIEWS  LOCATION: [facility]  DATE/TIME: 9/16/2022 9:43 PM    INDICATION: swallowed FB  Wire from face mask  COMPARISON: 8/28/2022      Impression    IMPRESSION: No change. No radiopaque foreign body identified.  Heart size and pulmonary vessels are normal. Lungs are clear. Mild scoliosis.     Medications - No data to display     Assessments & Plan (with Medical Decision Making)   Nevin Alvarado is a 31 year old female with a past medical history of borderline personality disorder, PTSD, anxiety, chronic pain, and self-injurious behavior including multiple incidences of foreign body ingestion, or placing objects in the rectum, who presents after swallowing a metal wire taken from a surgical mask.     Ddx: Foreign body ingestion, gastric perforation, esophageal perforation    X-ray of the abdomen shows a 10 cm linear metallic foreign body overlying the left upper quadrant in oblique orientation.  Chest x-ray normal.  No abnormality in the chest or neck.    GI consulted.  They requested a stat COVID test.  After reviewing patient's chart and their guidelines, they recommend observing the patient overnight and keeping her n.p.o.  They plan to take her to the OR in the morning at 8 AM for EGD.    ED OBS called for admission but they are not allowed to admit this patient to their service.  I placed patient on observation status under my name.    Patient requesting pain meds.  I informed her that she has to be n.p.o. so we would not be giving her pain medications.  She asked if she could have IV pain medications.  Her current pain does not warrant IV analgesics.  Her  abdomen is soft and minimally tender without guarding or rebound.  I offered the patient a hot pack which she said she was willing to try although she did not think it would help.    Patient signed out to Dr. Pryor at shift change.       I have reviewed the nursing notes. I have reviewed the findings, diagnosis, plan and need for follow up with the patient.    New Prescriptions    No medications on file       Final diagnoses:   Swallowed foreign body, initial encounter       --  Inessa [facility] EMERGENCY DEPARTMENT  9/16/2022     Inessa Barlow MD  09/16/22 [id number]
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
## 2022-09-17 NOTE — ANESTHESIA CARE TRANSFER NOTE
Patient: Nevin Alvarado    Procedure: Procedure(s):  ESOPHAGOGASTRODUODENOSCOPY (EGD), Foreign Body removal       Diagnosis: Swallowed foreign body, initial encounter [T18.9XXA]  Diagnosis Additional Information: No value filed.    Anesthesia Type:   General     Note:    Oropharynx: spontaneously breathing  Level of Consciousness: awake  Oxygen Supplementation: face mask  Level of Supplemental Oxygen (L/min / FiO2): 6  Independent Airway: airway patency satisfactory and stable  Dentition: dentition unchanged  Vital Signs Stable: post-procedure vital signs reviewed and stable  Report to RN Given: handoff report given  Patient transferred to: PACU    Handoff Report: Identifed the Patient, Identified the Reponsible Provider, Reviewed the pertinent medical history, Discussed the surgical course, Reviewed Intra-OP anesthesia mangement and issues during anesthesia, Set expectations for post-procedure period and Allowed opportunity for questions and acknowledgement of understanding      Vitals:  Vitals Value Taken Time   /111 09/17/22 0915   Temp 97.8    Pulse 102 09/17/22 0917   Resp 15 09/17/22 0917   SpO2 99 % 09/17/22 0917   Vitals shown include unvalidated device data.    Electronically Signed By: Agusto Reyna MD  September 17, 2022  9:19 AM

## 2022-09-17 NOTE — ED NOTES
Patient arrived awake and alert from PACU.  Patient denies any nausea or pain.  Asking for something to eat.  Patient given sip of water; swallowed without difficulty.

## 2022-09-17 NOTE — ED NOTES
Patient given ED discharge instructions, as well as instructions for sedation recovery to include no driving and no ETOH today; and also post endoscopy instructions.

## 2022-09-17 NOTE — OR NURSING
AVSS. Tolerable pain reported between episodes of sleep. Complained of itching. Dr. Pinto at bedside. Benadryl 25mg IV per patient request given. Skin CDI. No areas of redness or rash noted. Respiratory status WNL. kan Billingsley at bedside throughout PACU stay. Patient phone and glasses returned to patient per patient request. It was noted that ED MD stated patient can have items.

## 2022-09-17 NOTE — PROCEDURES
Gastroenterology Endoscopy Brief Operative Note    Procedure:  Upper endoscopy   Post-operative diagnosis:  Foreign body removal   Staff Physician:  Dr. Pamela Perez   Fellow/Assistant(s):  N/A    Specimens:  Please see final procedure note for further details.   Findings:  Flexible, coated metal wire, 5-6 inches in length was found in the stomach. Removed with snare. After removal endoscope re-introduced into the esophagus and full endoscopic exam to the 2nd portion duodenum was without abnormalities.   Complications:  None.   Condition:  Stable   Recommendations  Diet:  Return to previous diet  PPI:  N/A  Anti-coagulants/platelets:  N/A  Octreotide:  N/A  Discharge Planning:   Return patient to ED. Ok for discharge to home thereafter from [facility].

## 2022-09-17 NOTE — DISCHARGE INSTRUCTIONS
Please make an appointment to follow up with Your Mental Health Care Team as soon as possible even if entirely better.

## 2022-09-17 NOTE — ED NOTES
"     Emergency Department Patient Sign-out       Brief HPI:  This is a 31 year old female signed out to me by Dr. LORENZO Pryor .  See initial ED Provider note for details of the presentation.            Significant Events prior to my assuming care: complicated MH with recurrent esophageal FB      Exam:   Patient Vitals for the past 24 hrs:   BP Temp Temp src Pulse Resp SpO2 Height Weight   09/17/22 1009 (!) 145/80 (!) 96  F (35.6  C) Oral 98 16 95 % -- --   09/17/22 0940 -- -- -- -- 18 95 % -- --   09/17/22 0935 -- 97.9  F (36.6  C) Oral 98 12 100 % -- --   09/17/22 0930 136/89 -- -- 98 16 99 % -- --   09/17/22 0925 138/89 -- -- 102 15 100 % -- --   09/17/22 0920 -- -- -- 104 13 100 % -- --   09/17/22 0915 (!) 138/111 98.7  F (37.1  C) Oral 105 18 99 % -- --   09/17/22 0745 -- -- -- 115 22 -- -- --   09/17/22 0600 135/75 98.6  F (37  C) -- 99 17 96 % -- --   09/16/22 2019 (!) 142/89 -- -- -- -- -- -- --   09/16/22 2012 -- 98.8  F (37.1  C) Temporal 108 16 96 % 1.575 m (5' 2\") 135.2 kg (298 lb)           ED RESULTS:   Results for orders placed or performed during the hospital encounter of 09/16/22 (from the past 24 hour(s))   XR Abdomen 2 Views     Status: None    Collection Time: 09/16/22  9:41 PM    Narrative    EXAM: XR ABDOMEN 2 VIEWS  LOCATION: [facility]  DATE/TIME: 9/16/2022 9:41 PM    INDICATION: swallowed foreign body  Wire from face mask  COMPARISON: 08/28/2022      Impression    IMPRESSION: There is a 10 cm linear metallic foreign body overlying the left upper quadrant in an oblique orientation. The inferior 1 cm is folded upon itself. Given history, findings consistent with a swallowed wire in the stomach.    No free air. Bowel gas pattern is unremarkable.     Prior cholecystectomy. There is a mild S-shaped thoracolumbar scoliosis.   XR Chest 2 Views     Status: None    Collection Time: 09/16/22  9:43 PM    Narrative    EXAM: XR CHEST 2 VIEWS  LOCATION: [facility]  DATE/TIME: 9/16/2022 9:43 PM    INDICATION: swallowed FB  Wire from face mask  COMPARISON: 8/28/2022      Impression    IMPRESSION: No change. No radiopaque foreign body identified.  Heart size and pulmonary vessels are normal. Lungs are clear. Mild scoliosis.   Asymptomatic COVID-19 Virus (Coronavirus) by PCR Nasopharyngeal     Status: Normal    Collection Time: 09/16/22 11:09 PM    Specimen: Nasopharyngeal; Swab   Result Value Ref Range    SARS CoV2 PCR Negative Negative    Narrative    Testing was performed using the Xpert Xpress SARS-CoV-2 Assay on the  Cepheid Gene-Xpert Instrument Systems. Additional information about  this Emergency Use Authorization (EUA) assay can be found via the Lab  Guide. This test should be ordered for the detection of SARS-CoV-2 in  individuals who meet SARS-CoV-2 clinical and/or epidemiological  criteria. Test performance is unknown in asymptomatic patients. This  test is for in vitro diagnostic use under the FDA EUA for  laboratories certified under CLIA to perform high complexity testing.  This test has not been FDA cleared or approved. A negative result  does not rule out the presence of PCR inhibitors in the specimen or  target RNA in concentration below the limit of detection for the  assay. The possibility of a false negative should be considered if  the patient's recent exposure or clinical presentation suggests  COVID-19. This test was validated by the [facility]. This laboratory is certified under  the Clinical Laboratory Improvement Amendments of 1988 (CLIA-88) as  qualified to perform high complexity laboratory testing.     UPPER GI ENDOSCOPY     Status: None    Collection Time: 09/17/22  8:11 AM   Result Value Ref Range    Upper GI Endoscopy       [facility]  [address] [place] [address] [address] [phone]     Endoscopy  Department  _______________________________________________________________________________  Patient Name: Nevin Alvarado     Procedure Date: 9/17/2022 8:11 AM  MRN: [id number]                       Account Number: [id number]  YOB: 1990             Admit Type: Outpatient  Age: 31                               Room: [place] [address]  Gender: Female                        Note Status: Finalized  Attending MD: EVANS MORGAN MD  Total Sedation Time:   _______________________________________________________________________________     Procedure:             Upper GI endoscopy  Indications:           Foreign body in the stomach  Providers:             EVANS MORGAN MD, Mena Meadows MD:            Medicines:             General Anesthesia  Complications:         No immediate complications.  _____________________________________ __________________________________________  Procedure:             Pre-Anesthesia Assessment:                         - See separate Anesthesia note for pre-anesthesia                          assessment.                         After obtaining informed consent, the endoscope was                          passed under direct vision. Throughout the procedure,                          the patient's blood pressure, pulse, and oxygen                          saturations were monitored continuously. The Endoscope                          was introduced through the mouth, and advanced to the                          second part of duodenum. The upper GI endoscopy was                          accomplished without difficulty. The patient tolerated                          the procedure well.                                                                                   Findings:       Flexible, coated wire from a face mask were found in the gastric body.        Removal was accomplished with  a snare.       The endoscope was then re-introduced into  the esophagus and to the        second portion of the duodenum to evaluate for any trauma related to the        foreign body removal.       Z-line was regular and was found 40 cm from the incisors.       Evidence of scarring from prior mucosal injury in the esophagus at 25 cm        and 35 cm from the incisors was seen. This is similar in appearance to        what has been seen with prior endoscopic exams.       The exam of the esophagus was otherwise normal with easy passage of the        adult endoscope through the esophagus and GE junction.       A couple small areas of erythema consistent with mild trauma related to        the swallowed wire were seen in the stomach.       The exam of the stomach was otherwise normal.       The exam of the duodenum was normal.                                                                                   Impression:            - Flexible, coated wire from a face mask were found in                           the stomach. Removal was successful.                         - Z-line regular, 40 cm from the incisors.                         - Prior scarring again seen in the esophagus.                         - Small areas of minimal trauma from the swallowed                          wire were seen in the stomach.                         - Otherwise normal exam.  Recommendation:        - Return patient to hospital cooper. Ok to discharge to                          home from [facility] after recovery from procedure.                         - Resume previous diet.                         - Continue present medications.                         - Continue outpatient mental health management with                          close follow-up.                                                                                     Pamela Perez MD  _______________________________  PAMELA PEREZ MD  9/17/2022 9:10:24 AM  I was physically present for the entire viewing por tion  of the exam.  __________________________  Signature of teaching physician  Amairani/Amber MORGAN MD  Number of Addenda: 0    Note Initiated On: 9/17/2022 8:11 AM  Scope In:  Scope Out:         ED MEDICATIONS:   Medications   0.9% sodium chloride BOLUS (1,000 mLs Intravenous New Bag 9/17/22 0656)     Followed by   sodium chloride 0.9% infusion (125 mL/hr Intravenous New Bag 9/17/22 0917)   naloxone (NARCAN) injection 0.2 mg (has no administration in time range)   naloxone (NARCAN) injection 0.4 mg (has no administration in time range)   naloxone (NARCAN) injection 0.2 mg (has no administration in time range)   naloxone (NARCAN) injection 0.4 mg (has no administration in time range)   flumazenil (ROMAZICON) injection 0.2 mg (has no administration in time range)   simethicone 133mg/2mL oral suspension (2 mLs Oral Given 9/17/22 0845)   diphenhydrAMINE (BENADRYL) injection 25 mg (25 mg Intravenous Given 9/17/22 0923)         Impression:    ICD-10-CM    1. Swallowed foreign body, initial encounter  T18.9XXA        Plan:    Pending studies include Esophageal FB retrieved per GI, observed in PACU, able to ambulate without any problems, discharge with  follow up..        Mila Diallo MD, Mila Fernandez MD  09/17/22 6725

## 2022-09-17 NOTE — PROGRESS NOTES
Upon initial assessment, patient siting on bed, AAOx4, even and unlabeled respirations, responds to verbal and tactile stimuli, NAD noted at this time. patient states she attempted to swallow metal because  she felt like it . Provider aware. Sitter at bedside. Security called to assess patient. Room made safe. Will continue to observe.

## 2022-09-17 NOTE — ANESTHESIA POSTPROCEDURE EVALUATION
Patient: Nevin Alvarado    Procedure: Procedure(s):  ESOPHAGOGASTRODUODENOSCOPY (EGD), Foreign Body removal       Anesthesia Type:  General    Note:  Disposition: Outpatient   Postop Pain Control: Uneventful            Sign Out: Well controlled pain   PONV: No   Neuro/Psych: Uneventful            Sign Out: Acceptable/Baseline neuro status   Airway/Respiratory: Uneventful            Sign Out: Acceptable/Baseline resp. status   CV/Hemodynamics: Uneventful            Sign Out: Acceptable CV status; No obvious hypovolemia; No obvious fluid overload   Other NRE: NONE   DID A NON-ROUTINE EVENT OCCUR? No           Last vitals:  Vitals Value Taken Time   /82    Temp 97.8    Pulse 95 09/17/22 0908   Resp 14 09/17/22 0908   SpO2 99 % 09/17/22 0908   Vitals shown include unvalidated device data.    Electronically Signed By: Agusto Reyna MD  September 17, 2022  9:08 AM

## 2022-09-17 NOTE — OR NURSING
Bedside attendant with patient in pre-op area. Patient alert/oriented. Talkative and very animated with interactions. Voided x1. No issues noted.

## 2022-09-17 NOTE — ANESTHESIA PROCEDURE NOTES
Airway       Patient location during procedure: OR       Procedure Start/Stop Times: 9/17/2022 8:29 AM  Staff -        Anesthesiologist:  Cristino Pinto MD       Resident/Fellow: Agusto Reyna MD       Performed By: anesthesiologist  Consent for Airway        Urgency: elective  Indications and Patient Condition       Indications for airway management: isma-procedural       Induction type:intravenous       Mask difficulty assessment: 1 - vent by mask    Final Airway Details       Final airway type: endotracheal airway       Successful airway: ETT - single  Endotracheal Airway Details        ETT size (mm): 7.0       Cuffed: yes       Successful intubation technique: video laryngoscopy       VL Blade Size: MAC D Blade       Grade View of Cords: 1       Adjucts: stylet       Position: Right       Measured from: gums/teeth       Secured at (cm): 22       Bite Block used: GI bite block.    Post intubation assessment        Number of attempts at approach: 1       Secured with: silk tape       Ease of procedure: easy       Dentition: Intact and Unchanged    Medication(s) Administered   Medication Administration Time: 9/17/2022 8:29 AM

## 2022-09-20 ENCOUNTER — PATIENT OUTREACH (OUTPATIENT)
Dept: CARE COORDINATION | Facility: CLINIC | Age: 32
End: 2022-09-20

## 2022-09-20 NOTE — TELEPHONE ENCOUNTER
FUTURE VISIT INFORMATION      FUTURE VISIT INFORMATION:    Date: 10/24/2022    Time: 11 AM with SLP    Location: [facility]  REFERRAL INFORMATION:    Referring provider: Dr. Ruiz    Referring providers clinic:  [facility]    Reason for visit/diagnosis: Left TVC Paresis    RECORDS REQUESTED FROM:       Clinic name Comments Records Status Imaging Status   [facility] ([facility]) 10/11/22 - SPEECH OV with JAMIE Rowley [place] 9/16/22 - ED OV with Dr. Barlow  9/4/22 - ED OV with CARMELO Bloom  8/31/22 - ED OV with Dr. Lovell  8/23/22 - Admission with Dr. Ann  * Additional ED/Admissions in [facility] 4/19/21 - Admission with Dr. Flynn [facility]    [facility] - Procedure 9/16/22 - EGD  8/23/22 - EGD  8/13/22 - EGD  * Additional EGDs in [facility] - Imaging 9/16/22 - XR Chest  8/31/22 - CT Chest  8/28/22, 8/23/22, 8/22/22, 7/28/22, 7/9/22, 2/13/22, 2/8/22 - XR Chest  8/22/22 - CT Chest  7/5/22 - CT Cervical Spine  7/5/22 - CT Head  2/13/22 - CT Chest/Abd/Pelvis  2/8/22 - CT Neck  * Additional Images in Epic/PACs Epic PACs   [facility] 8/16/22 - ED OV with Kellen Cheney  7/15/22 - Admission with Dr. Kearns  5/18/22 - Admission with Dr. Tabor  * Additional Admissions in Care Everywhere Care Everywhere    [facility] 8/1/18 - ENT OV with Dr. Sanabria Care Everywhere    [facility] - Biopsy 4/8/21 - Esophagus Biopsy (Case: [id number]) Care Everywhere    [facility] - Procedure 8/10/22 - EGD  7/22/22 - EGD  7/16/22 - EGD  * Additional in Care Everywhere Care Everywhere    [facility] - Imaging  Fax: [phone] 8/16/22 - CT Neck  8/16/22 - XR Neck  8/16/22 - XR Chest  8/9/22 - XR Chest  7/21/22 - XR Chest  5/29/22 - XR Chest  9/30/21 - CTA Chest  8/12/21 - XR Neck  4/5/21 - MRI Cervical  3/29/21 - CT Head/Brain  3/29/21 - CT Cervical  12/22/19 - XR Chest  12/16/19 - XR Chest  11/16/19 - CT Chest  11/15/19 - CT Chest  11/10/19 - CT Chest/Abd/Pelvis  11/7/19 - XR Neck  1/11/18 - CT  Neck  1/9/18 - CT Neck Care Everywhere 9/20 Req - PACS   [facility] 9/23/20 - Admission with Dr. Morales  9/11/19 - Admission with Dr. Pina  * Additional Admissions in Care Everywhere Care Everywhere    [facility] - Biopsy 1/29/19 - Gastroesophageal Biopsy (Case: [id number])  3/18/18 - GE Junction Biopsy (Case: [id number]) Care Everywhere    [facility] - Procedure 2/23/22 - EGD  8/24/21 - EGD  7/9/21 - EGD  * Additional in Care Everywhere Care Everywhere    [facility] - Imaging  Fax: [phone]  2/23/22 - XR Chest  8/2/20 - XR Chest  11/2/19 - CT Chest/Abd/Pelvis  10/18/19 - CTA Chest Care Everywhere 9/20 Req - PACS   [facility] 8/28/20 - Admission with Dr. Santana  11/26/19 - Admission with Dr. Wilburn Care Everywhere    [facility] - Procedure 8/16/22 - EGD  7/10/22 - EGD  10/8/20 - EGD  * Additional in Care Everywhere Care Everywhere    [facility] - Procedure 7/26/22 - EGD Care Everywhere            * 9/20/22 12:32 PM Faxed req to  [facility], and [facility] for images to be pushed to [facility] PACs. - [place]  10/10/22 9AM images received in PACS - Am[facility]

## 2022-09-20 NOTE — PROGRESS NOTES
Clinic Care Coordination Contact  [facility]/Voicemail       Clinical Data: Care Coordinator Outreach-TCM    Outreach attempted x 2.  Left message on patient's voicemail with call back information and requested return call.    Plan: Care Coordinator will make no further outreaches at this time.    LEATHA Connell   Social Work Clinic Care Coordinator   [facility]  PH: [phone]  [email or web address]

## 2022-09-22 ENCOUNTER — NURSE TRIAGE (OUTPATIENT)
Dept: NURSING | Facility: CLINIC | Age: 32
End: 2022-09-22

## 2022-09-22 NOTE — TELEPHONE ENCOUNTER
Woke up in the middle of the night with acid reflux   Took tums   Slept for an hour    Woke up about 40 min ago and feels a lump in throat   Can barely swallow anything including water  When she tries to swallow feels like there is a rock in there  The water tries to come back up, very hard to get it to go down   Pain in throat that feels like a stabbing pain  Rates 6/10    Feels like thick air when she breathes    Back of throat looks like red and white splotches and like it is swollen    No difficulty breathing  No fever    Triaged to a disposition of Go to ED. Patient is agreeable.     Reason for Disposition    SEVERE difficulty swallowing (e.g., drooling or spitting, can't swallow water)    Symptoms of food or bone stuck in throat or esophagus (e.g., pain in throat or chest, FB sensation, blood-tinged saliva)    Additional Information    Negative: SEVERE difficulty breathing (e.g., struggling for each breath, speaks in single words, stridor)    Negative: Sounds like a life-threatening emergency to the triager    Negative: [1] Diagnosed strep throat AND [2] taking antibiotic AND [3] symptoms continue    Negative: Throat culture results, call about    Negative: Productive cough is main symptom    Negative: Non-productive cough is main symptom    Negative: Hoarseness is main symptom    Negative: Runny nose is main symptom    Negative: Uvula swelling is main symptom    Negative: [1] Drooling or spitting out saliva (because can't swallow) AND [2] normal breathing    Negative: Unable to open mouth completely    Negative: [1] Difficulty breathing AND [2] not severe    Negative: Fever > 104 F (40 C)    Negative: [1] Refuses to drink anything AND [2] for > 12 hours    Negative: [1] Drinking very little AND [2] dehydration suspected (e.g., no urine > 12 hours, very dry mouth, very lightheaded)    Negative: Patient sounds very sick or weak to the triager    Negative: SEVERE (e.g., excruciating) throat pain    Negative: [1]  "Pus on tonsils (back of throat) AND [2]  fever AND [3] swollen neck lymph nodes (\"glands\")    Negative: [1] Severe difficulty swallowing (e.g., drooling or spitting) AND [2] started suddenly after taking a medicine or allergic food    Negative: Wheezing, stridor, hoarseness, or difficulty breathing    Negative: [1] Swollen tongue AND [2] sudden onset    Negative: Sounds like a life-threatening emergency to the triager    Negative: Mouth ulcers are seen    Negative: Sore throat (throat pain with swallowing)    Negative: Swallowed a (non-edible) foreign body    Negative: Feeding tube, questions or concerns related to    Negative: Swelling of tongue    Protocols used: SWALLOWING DIFFICULTY-A-, SORE THROAT-A-    Toya Okeefe RN on 9/22/2022 at 4:11 AM      "

## 2022-09-24 ENCOUNTER — HOSPITAL ENCOUNTER (EMERGENCY)
Facility: CLINIC | Age: 32
Discharge: HOME | End: 2022-09-25
Attending: EMERGENCY MEDICINE | Admitting: EMERGENCY MEDICINE
Payer: COMMERCIAL

## 2022-09-24 DIAGNOSIS — T18.9XXA SWALLOWED FOREIGN BODY, INITIAL ENCOUNTER: ICD-10-CM

## 2022-09-24 DIAGNOSIS — N92.0 MENORRHAGIA WITH REGULAR CYCLE: ICD-10-CM

## 2022-09-24 LAB
ALBUMIN SERPL-MCNC: 3.5 G/DL (ref 3.4–5)
ALP SERPL-CCNC: 79 U/L (ref 40–150)
ALT SERPL W P-5'-P-CCNC: 45 U/L (ref 0–50)
ANION GAP SERPL CALCULATED.3IONS-SCNC: 9 MMOL/L (ref 3–14)
AST SERPL W P-5'-P-CCNC: 28 U/L (ref 0–45)
BASOPHILS # BLD AUTO: 0 10E3/UL (ref 0–0.2)
BASOPHILS NFR BLD AUTO: 0 %
BILIRUB SERPL-MCNC: 0.2 MG/DL (ref 0.2–1.3)
BUN SERPL-MCNC: 10 MG/DL (ref 7–30)
CALCIUM SERPL-MCNC: 9.3 MG/DL (ref 8.5–10.1)
CHLORIDE BLD-SCNC: 99 MMOL/L (ref 94–109)
CO2 SERPL-SCNC: 28 MMOL/L (ref 20–32)
CREAT SERPL-MCNC: 0.55 MG/DL (ref 0.52–1.04)
EOSINOPHIL # BLD AUTO: 0.2 10E3/UL (ref 0–0.7)
EOSINOPHIL NFR BLD AUTO: 3 %
ERYTHROCYTE [DISTWIDTH] IN BLOOD BY AUTOMATED COUNT: 12.7 % (ref 10–15)
GFR SERPL CREATININE-BSD FRML MDRD: >90 ML/MIN/1.73M2
GLUCOSE BLD-MCNC: 116 MG/DL (ref 70–99)
HCG SERPL QL: NEGATIVE
HCT VFR BLD AUTO: 39.7 % (ref 35–47)
HGB BLD-MCNC: 13 G/DL (ref 11.7–15.7)
HOLD SPECIMEN: NORMAL
HOLD SPECIMEN: NORMAL
IMM GRANULOCYTES # BLD: 0 10E3/UL
IMM GRANULOCYTES NFR BLD: 0 %
LIPASE SERPL-CCNC: 175 U/L (ref 73–393)
LYMPHOCYTES # BLD AUTO: 2.1 10E3/UL (ref 0.8–5.3)
LYMPHOCYTES NFR BLD AUTO: 22 %
MCH RBC QN AUTO: 30 PG (ref 26.5–33)
MCHC RBC AUTO-ENTMCNC: 32.7 G/DL (ref 31.5–36.5)
MCV RBC AUTO: 92 FL (ref 78–100)
MONOCYTES # BLD AUTO: 0.6 10E3/UL (ref 0–1.3)
MONOCYTES NFR BLD AUTO: 7 %
NEUTROPHILS # BLD AUTO: 6.5 10E3/UL (ref 1.6–8.3)
NEUTROPHILS NFR BLD AUTO: 68 %
NRBC # BLD AUTO: 0 10E3/UL
NRBC BLD AUTO-RTO: 0 /100
PLATELET # BLD AUTO: 246 10E3/UL (ref 150–450)
POTASSIUM BLD-SCNC: 3.5 MMOL/L (ref 3.4–5.3)
PROT SERPL-MCNC: 7.2 G/DL (ref 6.8–8.8)
RBC # BLD AUTO: 4.34 10E6/UL (ref 3.8–5.2)
SODIUM SERPL-SCNC: 136 MMOL/L (ref 133–144)
WBC # BLD AUTO: 9.5 10E3/UL (ref 4–11)

## 2022-09-24 PROCEDURE — 85041 AUTOMATED RBC COUNT: CPT | Performed by: EMERGENCY MEDICINE

## 2022-09-24 PROCEDURE — 80053 COMPREHEN METABOLIC PANEL: CPT | Performed by: EMERGENCY MEDICINE

## 2022-09-24 PROCEDURE — 84703 CHORIONIC GONADOTROPIN ASSAY: CPT | Performed by: EMERGENCY MEDICINE

## 2022-09-24 PROCEDURE — 83690 ASSAY OF LIPASE: CPT | Performed by: EMERGENCY MEDICINE

## 2022-09-24 PROCEDURE — 99285 EMERGENCY DEPT VISIT HI MDM: CPT | Mod: 25

## 2022-09-24 PROCEDURE — 36415 COLL VENOUS BLD VENIPUNCTURE: CPT | Performed by: EMERGENCY MEDICINE

## 2022-09-24 PROCEDURE — 85025 COMPLETE CBC W/AUTO DIFF WBC: CPT | Performed by: EMERGENCY MEDICINE

## 2022-09-25 ENCOUNTER — APPOINTMENT (OUTPATIENT)
Dept: GENERAL RADIOLOGY | Facility: CLINIC | Age: 32
End: 2022-09-25
Attending: EMERGENCY MEDICINE
Payer: COMMERCIAL

## 2022-09-25 ENCOUNTER — APPOINTMENT (OUTPATIENT)
Dept: ULTRASOUND IMAGING | Facility: CLINIC | Age: 32
End: 2022-09-25
Attending: EMERGENCY MEDICINE
Payer: COMMERCIAL

## 2022-09-25 VITALS
OXYGEN SATURATION: 89 % | SYSTOLIC BLOOD PRESSURE: 130 MMHG | HEART RATE: 88 BPM | RESPIRATION RATE: 20 BRPM | WEIGHT: 293 LBS | DIASTOLIC BLOOD PRESSURE: 87 MMHG | BODY MASS INDEX: 53.92 KG/M2 | TEMPERATURE: 97.7 F | HEIGHT: 62 IN

## 2022-09-25 LAB — UPPER GI ENDOSCOPY: NORMAL

## 2022-09-25 PROCEDURE — 250N000011 HC RX IP 250 OP 636: Performed by: INTERNAL MEDICINE

## 2022-09-25 PROCEDURE — 250N000011 HC RX IP 250 OP 636

## 2022-09-25 PROCEDURE — 74018 RADEX ABDOMEN 1 VIEW: CPT

## 2022-09-25 PROCEDURE — 999N000099 HC STATISTIC MODERATE SEDATION < 10 MIN: Performed by: INTERNAL MEDICINE

## 2022-09-25 PROCEDURE — 71046 X-RAY EXAM CHEST 2 VIEWS: CPT

## 2022-09-25 PROCEDURE — 76856 US EXAM PELVIC COMPLETE: CPT

## 2022-09-25 PROCEDURE — 250N000013 HC RX MED GY IP 250 OP 250 PS 637: Performed by: EMERGENCY MEDICINE

## 2022-09-25 PROCEDURE — 43247 EGD REMOVE FOREIGN BODY: CPT | Performed by: INTERNAL MEDICINE

## 2022-09-25 RX ORDER — MEDROXYPROGESTERONE ACETATE 10 MG
10 TABLET ORAL ONCE
Status: COMPLETED | OUTPATIENT
Start: 2022-09-25 | End: 2022-09-25

## 2022-09-25 RX ORDER — DIPHENHYDRAMINE HYDROCHLORIDE 50 MG/ML
25-50 INJECTION INTRAMUSCULAR; INTRAVENOUS
Status: DISCONTINUED | OUTPATIENT
Start: 2022-09-25 | End: 2022-09-25 | Stop reason: HOSPADM

## 2022-09-25 RX ORDER — SIMETHICONE 40MG/0.6ML
133 SUSPENSION, DROPS(FINAL DOSAGE FORM)(ML) ORAL
Status: DISCONTINUED | OUTPATIENT
Start: 2022-09-25 | End: 2022-09-25 | Stop reason: HOSPADM

## 2022-09-25 RX ORDER — ATROPINE SULFATE 0.1 MG/ML
1 INJECTION INTRAVENOUS
Status: DISCONTINUED | OUTPATIENT
Start: 2022-09-25 | End: 2022-09-25 | Stop reason: HOSPADM

## 2022-09-25 RX ORDER — MEDROXYPROGESTERONE ACETATE 10 MG
10 TABLET ORAL DAILY
Qty: 10 TABLET | Refills: 0 | Status: SHIPPED | OUTPATIENT
Start: 2022-09-25 | End: 2023-07-07

## 2022-09-25 RX ORDER — FENTANYL CITRATE 50 UG/ML
INJECTION, SOLUTION INTRAMUSCULAR; INTRAVENOUS
Status: COMPLETED
Start: 2022-09-25 | End: 2022-09-25

## 2022-09-25 RX ORDER — NALOXONE HYDROCHLORIDE 0.4 MG/ML
0.4 INJECTION, SOLUTION INTRAMUSCULAR; INTRAVENOUS; SUBCUTANEOUS
Status: DISCONTINUED | OUTPATIENT
Start: 2022-09-25 | End: 2022-09-25 | Stop reason: HOSPADM

## 2022-09-25 RX ORDER — NALOXONE HYDROCHLORIDE 0.4 MG/ML
0.2 INJECTION, SOLUTION INTRAMUSCULAR; INTRAVENOUS; SUBCUTANEOUS
Status: DISCONTINUED | OUTPATIENT
Start: 2022-09-25 | End: 2022-09-25 | Stop reason: HOSPADM

## 2022-09-25 RX ORDER — DIPHENHYDRAMINE HYDROCHLORIDE 50 MG/ML
INJECTION INTRAMUSCULAR; INTRAVENOUS
Status: COMPLETED
Start: 2022-09-25 | End: 2022-09-25

## 2022-09-25 RX ORDER — FLUMAZENIL 0.1 MG/ML
0.2 INJECTION, SOLUTION INTRAVENOUS
Status: DISCONTINUED | OUTPATIENT
Start: 2022-09-25 | End: 2022-09-25 | Stop reason: HOSPADM

## 2022-09-25 RX ORDER — EPINEPHRINE 1 MG/ML
0.1 INJECTION, SOLUTION, CONCENTRATE INTRAVENOUS
Status: DISCONTINUED | OUTPATIENT
Start: 2022-09-25 | End: 2022-09-25 | Stop reason: HOSPADM

## 2022-09-25 RX ORDER — ACETAMINOPHEN 500 MG
500 TABLET ORAL ONCE
Status: COMPLETED | OUTPATIENT
Start: 2022-09-25 | End: 2022-09-25

## 2022-09-25 RX ORDER — FENTANYL CITRATE 50 UG/ML
50-100 INJECTION, SOLUTION INTRAMUSCULAR; INTRAVENOUS EVERY 5 MIN PRN
Status: DISCONTINUED | OUTPATIENT
Start: 2022-09-25 | End: 2022-09-25 | Stop reason: HOSPADM

## 2022-09-25 RX ADMIN — FENTANYL CITRATE 50 MCG: 50 INJECTION, SOLUTION INTRAMUSCULAR; INTRAVENOUS at 02:45

## 2022-09-25 RX ADMIN — MIDAZOLAM HYDROCHLORIDE 2 MG: 1 INJECTION, SOLUTION INTRAMUSCULAR; INTRAVENOUS at 02:47

## 2022-09-25 RX ADMIN — MIDAZOLAM HYDROCHLORIDE 2 MG: 1 INJECTION, SOLUTION INTRAMUSCULAR; INTRAVENOUS at 02:44

## 2022-09-25 RX ADMIN — FENTANYL CITRATE 50 MCG: 50 INJECTION, SOLUTION INTRAMUSCULAR; INTRAVENOUS at 02:48

## 2022-09-25 RX ADMIN — MEDROXYPROGESTERONE ACETATE 10 MG: 10 TABLET ORAL at 04:32

## 2022-09-25 RX ADMIN — DIPHENHYDRAMINE HYDROCHLORIDE 50 MG: 50 INJECTION, SOLUTION INTRAMUSCULAR; INTRAVENOUS at 02:51

## 2022-09-25 RX ADMIN — ACETAMINOPHEN 500 MG: 500 TABLET ORAL at 01:46

## 2022-09-25 ASSESSMENT — ACTIVITIES OF DAILY LIVING (ADL)
ADLS_ACUITY_SCORE: 40

## 2022-09-25 NOTE — ED NOTES
Pt moved to  3 because she told EDT that she when she used the bathroom just now, she swallowed the metal nosepiece of her mask.  RN asked why she did that, she said she just can't help it when she does this kind of thing (self harm)

## 2022-09-25 NOTE — OR NURSING
EGD with foreign body removal done in ER with Dr. Griffin.  Medications and monitoring per ER staff.  Patient tolerated procedure well.

## 2022-09-25 NOTE — ED TRIAGE NOTES
BIBA from her group home ([address] c/o dizziness today and RLQ abdominal pain and vaginal bleeding x 1 month.      Triage Assessment     Row Name 09/24/22 1953       Triage Assessment (Adult)    Airway WDL WDL       Respiratory WDL    Respiratory WDL WDL       Skin Circulation/Temperature WDL    Skin Circulation/Temperature WDL WDL       Cardiac WDL    Cardiac WDL WDL       Peripheral/Neurovascular WDL    Peripheral Neurovascular WDL WDL       Cognitive/Neuro/Behavioral WDL    Cognitive/Neuro/Behavioral WDL X  c/o dizziness

## 2022-09-25 NOTE — ED PROVIDER NOTES
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    Interventions:  Medications   sodium chloride (PF) 0.9% PF flush 3 mL (has no administration in time range)   benzocaine 20% (HURRICAINE/TOPEX) 20 % spray 0.5 mL (has no administration in time range)   diphenhydrAMINE (BENADRYL) injection 25-50 mg (has no administration in time range)   simethicone (MYLICON) suspension 133 mg (has no administration in time range)   atropine injection 1 mg  (has no administration in time range)   0.9% sodium chloride BOLUS (has no administration in time range)   EPINEPHrine PF (ADRENALIN) injection 0.1 mg (has no administration in time range)   glucagon injection 0.5 mg (has no administration in time range)   midazolam (VERSED) injection 0.5-2 mg (2 mg Intravenous Given 9/25/22 0247)   flumazenil (ROMAZICON) injection 0.2 mg (has no administration in time range)   fentaNYL (PF) (SUBLIMAZE) injection  mcg (50 mcg Intravenous Given 9/25/22 0248)   naloxone (NARCAN) injection 0.2 mg (has no administration in time range)     Or   naloxone (NARCAN) injection 0.4 mg (has no administration in time range)     Or   naloxone (NARCAN) injection 0.2 mg (has no administration in time range)     Or   naloxone (NARCAN) injection 0.4 mg (has no administration in time range)   medroxyPROGESTERone (PROVERA) tablet 10 mg (has no administration in time range)   acetaminophen (TYLENOL) tablet 500 mg (500 mg Oral Given 9/25/22 0146)   diphenhydrAMINE (BENADRYL) 50 MG/ML injection (50 mg  Given 9/25/22 0251)        Disposition:  The patient was discharged to home.     Impression & Plan      Medical Decision Making:  This patient is a 31-year-old female with significant psychiatric comorbidities who presents today with vaginal bleeding.  She states she had menorrhagia for the past month.  There is no significant lightheadedness, chest pain, shortness of breath.  Here her hemoglobin is well within normal limits.  I think it is reasonable given her symptoms though to start her on Provera for 10 days to see if that helps.  I have asked her to follow-up with her PCP/OB for this.  Unfortunately while she was here she also managed to swallow the metal insert into a mask.  X-rays confirmed this was still in the stomach.  I spoke with the GI physician who felt that it should come out so she got an EGD today at the bedside which was successful.  She will be discharged home for further management  as an outpatient.  Diagnosis:    ICD-10-CM    1. Menorrhagia with regular cycle  N92.0    2. Swallowed foreign body, initial encounter  T18.9XXA         Discharge Medications:  New Prescriptions    MEDROXYPROGESTERONE (PROVERA) 10 MG TABLET    Take 1 tablet (10 mg) by mouth daily        9/24/2022   Karime More*        Karime More MD  09/25/22 0315

## 2022-09-25 NOTE — ED NOTES
Bed: [facility]  Expected date:   Expected time:   Means of arrival:   Comments:  Stab 3 coming back here

## 2022-09-25 NOTE — ED NOTES
Pt moved to formerly [facility] to secure garage door closed to provide secure environment for pt

## 2022-09-25 NOTE — CONSULTS
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 N/A 12/17/2019    Procedure: ESOPHAGOGASTRODUODENOSCOPY (EGD) with attempted foreign body removal;  Surgeon: Felipe Connolly MD;  Location: [facility];  Service: Gastroenterology    TX ESOPHAGOGASTRODUODENOSCOPY TRANSORAL DIAGNOSTIC N/A 12/21/2019    Procedure: ESOPHAGOGASTRODUODENOSCOPY (EGD) FOR FROEIGN BODY REMOVAL;  Surgeon: Binu Vigil MD;  Location: [facility];  Service: Gastroenterology    TX ESOPHAGOGASTRODUODENOSCOPY TRANSORAL DIAGNOSTIC N/A 7/22/2020    Procedure:  ESOPHAGOGASTRODUODENOSCOPY (EGD);  Surgeon: Bailey Arnold MD;  Location: [facility];  Service: Gastroenterology    OK ESOPHAGOGASTRODUODENOSCOPY TRANSORAL DIAGNOSTIC N/A 8/14/2020    Procedure: ESOPHAGOGASTRODUODENOSCOPY (EGD) FOREIGN BODY REMOVAL;  Surgeon: Jeffy Zuñiga MD;  Location: [facility];  Service: Gastroenterology    OK ESOPHAGOGASTRODUODENOSCOPY TRANSORAL DIAGNOSTIC N/A 2/25/2021    Procedure: ESOPHAGOGASTRODUODENOSCOPY (EGD) with foreign body reoval;  Surgeon: Bird Sethi MD;  Location: [facility];  Service: Gastroenterology    OK ESOPHAGOGASTRODUODENOSCOPY TRANSORAL DIAGNOSTIC N/A 4/19/2021    Procedure: ESOPHAGOGASTRODUODENOSCOPY (EGD);  Surgeon: Libia Rose MD;  Location: [facility];  Service: Gastroenterology    OK SURG DIAGNOSTIC EXAM, ANORECTAL N/A 2/5/2020    Procedure: EXAM UNDER ANESTHESIA, Flexible Sigmoidoscopy, Retrieval of Foreign Body;  Surgeon: Sasha Ivan MD;  Location: [facility];  Service: General    RELEASE CARPAL TUNNEL Bilateral     RELEASE CARPAL TUNNEL Bilateral     REMOVAL, FOREIGN BODY, RECTUM N/A 7/21/2021    Procedure: MANUAL RETREIVALOF FOREIGN OBJECT- RECTUM.;  Surgeon: Aleksandra Gerber MD;  [place] [facility] [place]    SIGMOIDOSCOPY FLEXIBLE N/A 1/10/2017    Procedure: SIGMOIDOSCOPY FLEXIBLE;  Surgeon: Annmarie Haynes MD;  Location:  [facility]    SIGMOIDOSCOPY FLEXIBLE N/A 5/8/2018    Procedure: SIGMOIDOSCOPY FLEXIBLE;  flex sig with foreign body removal using snare and rattooth forcep;  Surgeon: Soham Cano MD;  Location: [facility]    SIGMOIDOSCOPY FLEXIBLE N/A 2/20/2019    Procedure: Exam under anesthesia Colonoscopy with attempted  removal of rectal foreign body;  Surgeon: Estrada Chávez MD;  Location:  OR       Social History     Tobacco Use    Smoking status: Never Smoker    Smokeless tobacco: Never Used   Substance Use Topics    Alcohol use: No     Alcohol/week: 0.0 standard drinks       Family  "History   Problem Relation Age of Onset    Diabetes Type 2  Maternal Grandmother     Diabetes Type 2  Paternal Grandmother     Breast Cancer Paternal Grandmother     Cerebrovascular Disease Father 53    Myocardial Infarction No family hx of     Coronary Artery Disease Early Onset No family hx of     Ovarian Cancer No family hx of     Colon Cancer No family hx of     Depression Mother     Anxiety Disorder Mother        REVIEW OF SYSTEMS:     5 point ROS negative except as noted above in HPI, including Gen., Resp., CV, GI &  system review.      PHYSICAL EXAM:   /63   Pulse 109   Temp 97.7  F (36.5  C) (Temporal)   Resp 17   Ht 1.575 m (5' 2\")   Wt 136.5 kg (301 lb)   LMP 09/12/2022   SpO2 96%   BMI 55.05 kg/m   Estimated body mass index is 55.05 kg/m  as calculated from the following:    Height as of this encounter: 1.575 m (5' 2\").    Weight as of this encounter: 136.5 kg (301 lb).   GENERAL APPEARANCE: healthy, alert, and no distress  MENTAL STATUS: alert  AIRWAY EXAM: Mallampatti Class I (visualization of the soft palate, fauces, uvula, anterior and posterior pillars)  RESP: lungs clear to auscultation - no rales, rhonchi or wheezes  CV: normal S1 S2, no S3 or S4      DIAGNOSTICS:    Not indicated      IMPRESSION   ASA Class 2 - Mild systemic disease        PLAN:       Plan for esophagogastroduodenoscopy (upper GI endoscopy). We discussed the risks, benefits and alternatives and the patient wished to proceed.    The above has been forwarded to the consulting provider.      Signed Electronically by: Kash Griffin MD,MD  September 25, 2022      [facility]  Ph: [phone] Fax: [phone]    "

## 2022-10-03 VITALS
OXYGEN SATURATION: 97 % | HEIGHT: 62 IN | WEIGHT: 293 LBS | DIASTOLIC BLOOD PRESSURE: 91 MMHG | TEMPERATURE: 87.5 F | BODY MASS INDEX: 53.92 KG/M2 | HEART RATE: 104 BPM | RESPIRATION RATE: 18 BRPM | SYSTOLIC BLOOD PRESSURE: 150 MMHG

## 2022-10-03 LAB
ALBUMIN SERPL-MCNC: 3.6 G/DL (ref 3.4–5)
ALP SERPL-CCNC: 77 U/L (ref 40–150)
ALT SERPL W P-5'-P-CCNC: 40 U/L (ref 0–50)
ANION GAP SERPL CALCULATED.3IONS-SCNC: 10 MMOL/L (ref 3–14)
AST SERPL W P-5'-P-CCNC: 13 U/L (ref 0–45)
BASOPHILS # BLD AUTO: 0 10E3/UL (ref 0–0.2)
BASOPHILS NFR BLD AUTO: 0 %
BILIRUB SERPL-MCNC: 0.2 MG/DL (ref 0.2–1.3)
BUN SERPL-MCNC: 14 MG/DL (ref 7–30)
CALCIUM SERPL-MCNC: 9.5 MG/DL (ref 8.5–10.1)
CHLORIDE BLD-SCNC: 104 MMOL/L (ref 94–109)
CO2 SERPL-SCNC: 28 MMOL/L (ref 20–32)
CREAT SERPL-MCNC: 0.48 MG/DL (ref 0.52–1.04)
EOSINOPHIL # BLD AUTO: 0.4 10E3/UL (ref 0–0.7)
EOSINOPHIL NFR BLD AUTO: 4 %
ERYTHROCYTE [DISTWIDTH] IN BLOOD BY AUTOMATED COUNT: 13.1 % (ref 10–15)
GFR SERPL CREATININE-BSD FRML MDRD: >90 ML/MIN/1.73M2
GLUCOSE BLD-MCNC: 154 MG/DL (ref 70–99)
HCT VFR BLD AUTO: 39 % (ref 35–47)
HGB BLD-MCNC: 12.9 G/DL (ref 11.7–15.7)
HOLD SPECIMEN: NORMAL
HOLD SPECIMEN: NORMAL
IMM GRANULOCYTES # BLD: 0 10E3/UL
IMM GRANULOCYTES NFR BLD: 0 %
LIPASE SERPL-CCNC: 112 U/L (ref 73–393)
LYMPHOCYTES # BLD AUTO: 2 10E3/UL (ref 0.8–5.3)
LYMPHOCYTES NFR BLD AUTO: 22 %
MCH RBC QN AUTO: 29.7 PG (ref 26.5–33)
MCHC RBC AUTO-ENTMCNC: 33.1 G/DL (ref 31.5–36.5)
MCV RBC AUTO: 90 FL (ref 78–100)
MONOCYTES # BLD AUTO: 0.7 10E3/UL (ref 0–1.3)
MONOCYTES NFR BLD AUTO: 8 %
NEUTROPHILS # BLD AUTO: 6 10E3/UL (ref 1.6–8.3)
NEUTROPHILS NFR BLD AUTO: 66 %
NRBC # BLD AUTO: 0 10E3/UL
NRBC BLD AUTO-RTO: 0 /100
PLATELET # BLD AUTO: 263 10E3/UL (ref 150–450)
POTASSIUM BLD-SCNC: 3.1 MMOL/L (ref 3.4–5.3)
PROT SERPL-MCNC: 7.2 G/DL (ref 6.8–8.8)
RBC # BLD AUTO: 4.34 10E6/UL (ref 3.8–5.2)
SODIUM SERPL-SCNC: 142 MMOL/L (ref 133–144)
WBC # BLD AUTO: 9.1 10E3/UL (ref 4–11)

## 2022-10-03 PROCEDURE — 250N000011 HC RX IP 250 OP 636: Performed by: EMERGENCY MEDICINE

## 2022-10-03 PROCEDURE — 999N000104 HC STATISTIC NO CHARGE

## 2022-10-03 PROCEDURE — 85025 COMPLETE CBC W/AUTO DIFF WBC: CPT | Performed by: EMERGENCY MEDICINE

## 2022-10-03 PROCEDURE — 83690 ASSAY OF LIPASE: CPT | Performed by: EMERGENCY MEDICINE

## 2022-10-03 PROCEDURE — 80053 COMPREHEN METABOLIC PANEL: CPT | Performed by: EMERGENCY MEDICINE

## 2022-10-03 PROCEDURE — 36415 COLL VENOUS BLD VENIPUNCTURE: CPT | Performed by: EMERGENCY MEDICINE

## 2022-10-03 RX ORDER — ONDANSETRON 4 MG/1
4 TABLET, ORALLY DISINTEGRATING ORAL ONCE
Status: COMPLETED | OUTPATIENT
Start: 2022-10-03 | End: 2022-10-03

## 2022-10-03 RX ADMIN — ONDANSETRON 4 MG: 4 TABLET, ORALLY DISINTEGRATING ORAL at 21:14

## 2022-10-04 ENCOUNTER — HOSPITAL ENCOUNTER (EMERGENCY)
Facility: CLINIC | Age: 32
Discharge: LEFT BEFORE DISCHARGE | End: 2022-10-04
Admitting: EMERGENCY MEDICINE
Payer: COMMERCIAL

## 2022-10-04 NOTE — ED NOTES
Patient does not want to wait to be seen.  Waiting for ride by ED door 4.   Patient stated intent to leave without being seen after [facility]  Patient informed that though they received the results, they still need to be seen by a provider, since they are preliminary results, and the provider may need to order more tests to better evaluate the patient's medical complaint. Patient stated they understood there are risks of leaving prior to being seen.

## 2022-10-08 ENCOUNTER — HOSPITAL ENCOUNTER (EMERGENCY)
Facility: CLINIC | Age: 32
Discharge: HOME | End: 2022-10-09
Attending: EMERGENCY MEDICINE | Admitting: EMERGENCY MEDICINE
Payer: COMMERCIAL

## 2022-10-08 VITALS
SYSTOLIC BLOOD PRESSURE: 150 MMHG | WEIGHT: 293 LBS | HEIGHT: 62 IN | OXYGEN SATURATION: 95 % | HEART RATE: 108 BPM | DIASTOLIC BLOOD PRESSURE: 86 MMHG | TEMPERATURE: 98.3 F | BODY MASS INDEX: 53.92 KG/M2 | RESPIRATION RATE: 18 BRPM

## 2022-10-08 DIAGNOSIS — R42 DIZZINESS: ICD-10-CM

## 2022-10-08 DIAGNOSIS — E86.0 DEHYDRATION: ICD-10-CM

## 2022-10-08 LAB
ALBUMIN SERPL BCG-MCNC: 4.1 G/DL (ref 3.5–5.2)
ALP SERPL-CCNC: 78 U/L (ref 35–104)
ALT SERPL W P-5'-P-CCNC: 35 U/L (ref 10–35)
ANION GAP SERPL CALCULATED.3IONS-SCNC: 11 MMOL/L (ref 7–15)
AST SERPL W P-5'-P-CCNC: 34 U/L (ref 10–35)
BASOPHILS # BLD AUTO: 0.1 10E3/UL (ref 0–0.2)
BASOPHILS NFR BLD AUTO: 1 %
BILIRUB SERPL-MCNC: <0.2 MG/DL
BUN SERPL-MCNC: 11.7 MG/DL (ref 6–20)
CALCIUM SERPL-MCNC: 9.4 MG/DL (ref 8.6–10)
CHLORIDE SERPL-SCNC: 100 MMOL/L (ref 98–107)
CREAT SERPL-MCNC: 0.61 MG/DL (ref 0.51–0.95)
DEPRECATED HCO3 PLAS-SCNC: 26 MMOL/L (ref 22–29)
EOSINOPHIL # BLD AUTO: 0.4 10E3/UL (ref 0–0.7)
EOSINOPHIL NFR BLD AUTO: 4 %
ERYTHROCYTE [DISTWIDTH] IN BLOOD BY AUTOMATED COUNT: 13.2 % (ref 10–15)
GFR SERPL CREATININE-BSD FRML MDRD: >90 ML/MIN/1.73M2
GLUCOSE SERPL-MCNC: 121 MG/DL (ref 70–99)
HCT VFR BLD AUTO: 38 % (ref 35–47)
HGB BLD-MCNC: 12.4 G/DL (ref 11.7–15.7)
IMM GRANULOCYTES # BLD: 0 10E3/UL
IMM GRANULOCYTES NFR BLD: 0 %
LACTATE SERPL-SCNC: 2.1 MMOL/L (ref 0.7–2)
LACTATE SERPL-SCNC: 2.3 MMOL/L (ref 0.7–2)
LIPASE SERPL-CCNC: 28 U/L (ref 13–60)
LYMPHOCYTES # BLD AUTO: 2.3 10E3/UL (ref 0.8–5.3)
LYMPHOCYTES NFR BLD AUTO: 26 %
MCH RBC QN AUTO: 29 PG (ref 26.5–33)
MCHC RBC AUTO-ENTMCNC: 32.6 G/DL (ref 31.5–36.5)
MCV RBC AUTO: 89 FL (ref 78–100)
MONOCYTES # BLD AUTO: 0.7 10E3/UL (ref 0–1.3)
MONOCYTES NFR BLD AUTO: 8 %
NEUTROPHILS # BLD AUTO: 5.5 10E3/UL (ref 1.6–8.3)
NEUTROPHILS NFR BLD AUTO: 61 %
NRBC # BLD AUTO: 0 10E3/UL
NRBC BLD AUTO-RTO: 0 /100
PLATELET # BLD AUTO: 281 10E3/UL (ref 150–450)
POTASSIUM SERPL-SCNC: 3.6 MMOL/L (ref 3.4–5.3)
PROT SERPL-MCNC: 6.9 G/DL (ref 6.4–8.3)
RBC # BLD AUTO: 4.28 10E6/UL (ref 3.8–5.2)
SODIUM SERPL-SCNC: 137 MMOL/L (ref 136–145)
WBC # BLD AUTO: 9.1 10E3/UL (ref 4–11)

## 2022-10-08 PROCEDURE — 80053 COMPREHEN METABOLIC PANEL: CPT | Performed by: NURSE PRACTITIONER

## 2022-10-08 PROCEDURE — 83605 ASSAY OF LACTIC ACID: CPT | Performed by: EMERGENCY MEDICINE

## 2022-10-08 PROCEDURE — 258N000003 HC RX IP 258 OP 636: Performed by: NURSE PRACTITIONER

## 2022-10-08 PROCEDURE — 83605 ASSAY OF LACTIC ACID: CPT | Performed by: NURSE PRACTITIONER

## 2022-10-08 PROCEDURE — 99285 EMERGENCY DEPT VISIT HI MDM: CPT | Mod: 25 | Performed by: EMERGENCY MEDICINE

## 2022-10-08 PROCEDURE — 96361 HYDRATE IV INFUSION ADD-ON: CPT

## 2022-10-08 PROCEDURE — 96374 THER/PROPH/DIAG INJ IV PUSH: CPT

## 2022-10-08 PROCEDURE — 99285 EMERGENCY DEPT VISIT HI MDM: CPT | Mod: 25

## 2022-10-08 PROCEDURE — 93010 ELECTROCARDIOGRAM REPORT: CPT | Performed by: EMERGENCY MEDICINE

## 2022-10-08 PROCEDURE — 83690 ASSAY OF LIPASE: CPT | Performed by: NURSE PRACTITIONER

## 2022-10-08 PROCEDURE — 93005 ELECTROCARDIOGRAM TRACING: CPT | Performed by: EMERGENCY MEDICINE

## 2022-10-08 PROCEDURE — 96375 TX/PRO/DX INJ NEW DRUG ADDON: CPT

## 2022-10-08 PROCEDURE — 36415 COLL VENOUS BLD VENIPUNCTURE: CPT | Performed by: EMERGENCY MEDICINE

## 2022-10-08 PROCEDURE — 36415 COLL VENOUS BLD VENIPUNCTURE: CPT | Performed by: NURSE PRACTITIONER

## 2022-10-08 PROCEDURE — 85025 COMPLETE CBC W/AUTO DIFF WBC: CPT | Performed by: NURSE PRACTITIONER

## 2022-10-08 PROCEDURE — 82040 ASSAY OF SERUM ALBUMIN: CPT | Performed by: NURSE PRACTITIONER

## 2022-10-08 PROCEDURE — 250N000011 HC RX IP 250 OP 636: Performed by: NURSE PRACTITIONER

## 2022-10-08 RX ORDER — IBUPROFEN 600 MG/1
1 TABLET, FILM COATED ORAL EVERY 6 HOURS PRN
COMMUNITY
Start: 2021-05-03 | End: 2022-12-08

## 2022-10-08 RX ORDER — FUROSEMIDE 20 MG
TABLET ORAL
COMMUNITY
Start: 2022-10-01 | End: 2022-12-08

## 2022-10-08 RX ORDER — FUROSEMIDE 20 MG
20 TABLET ORAL DAILY
COMMUNITY
Start: 2022-10-01 | End: 2024-03-30

## 2022-10-08 RX ORDER — SODIUM CHLORIDE 9 MG/ML
INJECTION, SOLUTION INTRAVENOUS CONTINUOUS
Status: DISCONTINUED | OUTPATIENT
Start: 2022-10-08 | End: 2022-10-09 | Stop reason: HOSPADM

## 2022-10-08 RX ORDER — BUSPIRONE HYDROCHLORIDE 10 MG/1
20 TABLET ORAL
COMMUNITY
End: 2022-12-08

## 2022-10-08 RX ORDER — HYDROXYCHLOROQUINE SULFATE 200 MG/1
200 TABLET, FILM COATED ORAL
COMMUNITY
End: 2022-12-08

## 2022-10-08 RX ORDER — SUCRALFATE 1 G/1
TABLET ORAL
COMMUNITY
Start: 2022-08-29 | End: 2022-12-08

## 2022-10-08 RX ORDER — OLANZAPINE 2.5 MG/1
1.25 TABLET, FILM COATED ORAL
COMMUNITY
Start: 2022-09-28 | End: 2023-07-07

## 2022-10-08 RX ORDER — OLANZAPINE 2.5 MG/1
TABLET, FILM COATED ORAL
COMMUNITY
Start: 2022-09-28 | End: 2022-12-08

## 2022-10-08 RX ORDER — CHOLECALCIFEROL (VITAMIN D3) 25 MCG
CAPSULE ORAL
COMMUNITY
Start: 2022-04-13 | End: 2022-12-08

## 2022-10-08 RX ORDER — FENTANYL CITRATE 50 UG/ML
25 INJECTION, SOLUTION INTRAMUSCULAR; INTRAVENOUS ONCE
Status: COMPLETED | OUTPATIENT
Start: 2022-10-08 | End: 2022-10-08

## 2022-10-08 RX ADMIN — SODIUM CHLORIDE 1000 ML: 9 INJECTION, SOLUTION INTRAVENOUS at 22:50

## 2022-10-08 RX ADMIN — PROCHLORPERAZINE EDISYLATE 5 MG: 5 INJECTION INTRAMUSCULAR; INTRAVENOUS at 20:23

## 2022-10-08 RX ADMIN — FENTANYL CITRATE 25 MCG: 50 INJECTION, SOLUTION INTRAMUSCULAR; INTRAVENOUS at 20:25

## 2022-10-08 RX ADMIN — SODIUM CHLORIDE 1000 ML: 9 INJECTION, SOLUTION INTRAVENOUS at 20:22

## 2022-10-08 ASSESSMENT — ENCOUNTER SYMPTOMS
MUSCULOSKELETAL NEGATIVE: 1
HEADACHES: 1
SINUS PAIN: 0
DIZZINESS: 1
WEAKNESS: 0
SINUS PRESSURE: 0
SORE THROAT: 0
VOMITING: 0
SHORTNESS OF BREATH: 1
SEIZURES: 0
EYES NEGATIVE: 1
NUMBNESS: 0
ENDOCRINE NEGATIVE: 1
DIARRHEA: 1
CARDIOVASCULAR NEGATIVE: 1
HEMATOLOGIC/LYMPHATIC NEGATIVE: 1
CONSTITUTIONAL NEGATIVE: 1
ABDOMINAL DISTENTION: 0
ABDOMINAL PAIN: 1
PSYCHIATRIC NEGATIVE: 1
TREMORS: 1
NAUSEA: 1

## 2022-10-08 ASSESSMENT — ACTIVITIES OF DAILY LIVING (ADL)
ADLS_ACUITY_SCORE: 40
ADLS_ACUITY_SCORE: 40

## 2022-10-09 ASSESSMENT — ACTIVITIES OF DAILY LIVING (ADL)
ADLS_ACUITY_SCORE: 40
ADLS_ACUITY_SCORE: 40

## 2022-10-09 NOTE — ED TRIAGE NOTES
"Pt BIBA with complaints of dizziness while standing and walking around, accompanied by nausea. Pt also reports \"inner ear pain.\" Was in the ED last week for abdominal pain, but left before being treated, pt states that this abdominal pain is ongoing, sharp and stabbing in nature, rating at an 8/10. VSS on RA except for BP is elevated at 172/91.     Triage Assessment     Row Name 10/08/22 1930       Triage Assessment (Adult)    Airway WDL WDL       Respiratory WDL    Respiratory WDL WDL       Skin Circulation/Temperature WDL    Skin Circulation/Temperature WDL WDL       Cardiac WDL    Cardiac WDL WDL       Peripheral/Neurovascular WDL    Peripheral Neurovascular WDL WDL       Cognitive/Neuro/Behavioral WDL    Cognitive/Neuro/Behavioral WDL WDL              "

## 2022-10-09 NOTE — ED NOTES
Pt ready for discharge, called  Natasha and she is aware of pt status and discharge, she will communicate with the group home staff so that they are aware that she will be returning within 60-90 mins.        Karis Dennis RN

## 2022-10-09 NOTE — ED PROVIDER NOTES
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    Gastrointestinal: Positive for abdominal pain, diarrhea and nausea. Negative for abdominal distention and vomiting.   Endocrine: Negative.    Genitourinary: Positive for vaginal bleeding.        Menses started today, only 3 days since last menses ended had been ongoing for 2 months   Musculoskeletal: Negative.    Skin: Negative.    Neurological: Positive for dizziness, tremors and  "headaches. Negative for seizures, syncope, weakness and numbness.   Hematological: Negative.    Psychiatric/Behavioral: Negative.      A complete review of systems was performed with pertinent positives and negatives noted in the HPI, and all other systems negative.    Physical Exam   BP: (!) 172/91  Pulse: 108  Temp: 98.3  F (36.8  C)  Resp: 18  Height: 157.5 cm (5' 2\")  Weight: 136.5 kg (301 lb)  SpO2: 95 %  Physical Exam  Vitals and nursing note reviewed.   Constitutional:       General: She is not in acute distress.     Appearance: Normal appearance. She is obese. She is not ill-appearing, toxic-appearing or diaphoretic.   HENT:      Head: Normocephalic and atraumatic.      Right Ear: Tympanic membrane, ear canal and external ear normal. There is no impacted cerumen.      Left Ear: Tympanic membrane, ear canal and external ear normal. There is no impacted cerumen.      Nose: Nose normal.      Mouth/Throat:      Mouth: Mucous membranes are moist.      Pharynx: Oropharynx is clear. No oropharyngeal exudate or posterior oropharyngeal erythema.   Eyes:      Pupils: Pupils are equal, round, and reactive to light.   Cardiovascular:      Rate and Rhythm: Regular rhythm. Tachycardia present.      Pulses: Normal pulses.      Heart sounds: Normal heart sounds.   Pulmonary:      Effort: Pulmonary effort is normal. No respiratory distress.      Breath sounds: Normal breath sounds.   Chest:      Chest wall: No tenderness.   Abdominal:      General: Abdomen is flat. There is no distension.      Palpations: Abdomen is soft.      Tenderness: There is abdominal tenderness. There is rebound. There is no right CVA tenderness, left CVA tenderness or guarding.      Comments: RLQ rebound tenderness with radiation into periumbilical and LUQ    Musculoskeletal:         General: Normal range of motion.      Cervical back: Normal range of motion and neck supple.   Skin:     General: Skin is warm and dry.      Capillary Refill: Capillary " refill takes less than 2 seconds.   Neurological:      General: No focal deficit present.      Mental Status: She is alert and oriented to person, place, and time.   Psychiatric:         Mood and Affect: Mood normal.         Behavior: Behavior normal.         ED Course      Procedures       The medical record was reviewed and interpreted.  Current labs reviewed and interpreted.  Previous labs reviewed and interpreted.      Item Assessment   Suicidal Ideation Suicidal intent (without specific plan)   Plan None   Intent Passive chronic SI   Suicidal or self-harm behaviors Swallows foreign bodies   Risk Factors Highly impulsive behavior   Protective Factors Living in a group home        Results for orders placed or performed during the hospital encounter of 10/08/22   Comprehensive metabolic panel     Status: Abnormal   Result Value Ref Range    Sodium 137 136 - 145 mmol/L    Potassium 3.6 3.4 - 5.3 mmol/L    Chloride 100 98 - 107 mmol/L    Carbon Dioxide (CO2) 26 22 - 29 mmol/L    Anion Gap 11 7 - 15 mmol/L    Urea Nitrogen 11.7 6.0 - 20.0 mg/dL    Creatinine 0.61 0.51 - 0.95 mg/dL    Calcium 9.4 8.6 - 10.0 mg/dL    Glucose 121 (H) 70 - 99 mg/dL    Alkaline Phosphatase 78 35 - 104 U/L    AST 34 10 - 35 U/L    ALT 35 10 - 35 U/L    Protein Total 6.9 6.4 - 8.3 g/dL    Albumin 4.1 3.5 - 5.2 g/dL    Bilirubin Total <0.2 <=1.2 mg/dL    GFR Estimate >90 >60 mL/min/1.73m2   Lipase     Status: Normal   Result Value Ref Range    Lipase 28 13 - 60 U/L   Lactic acid whole blood     Status: Abnormal   Result Value Ref Range    Lactic Acid 2.3 (H) 0.7 - 2.0 mmol/L   CBC with platelets and differential     Status: None   Result Value Ref Range    WBC Count 9.1 4.0 - 11.0 10e3/uL    RBC Count 4.28 3.80 - 5.20 10e6/uL    Hemoglobin 12.4 11.7 - 15.7 g/dL    Hematocrit 38.0 35.0 - 47.0 %    MCV 89 78 - 100 fL    MCH 29.0 26.5 - 33.0 pg    MCHC 32.6 31.5 - 36.5 g/dL    RDW 13.2 10.0 - 15.0 %    Platelet Count 281 150 - 450 10e3/uL    %  Neutrophils 61 %    % Lymphocytes 26 %    % Monocytes 8 %    % Eosinophils 4 %    % Basophils 1 %    % Immature Granulocytes 0 %    NRBCs per 100 WBC 0 <1 /100    Absolute Neutrophils 5.5 1.6 - 8.3 10e3/uL    Absolute Lymphocytes 2.3 0.8 - 5.3 10e3/uL    Absolute Monocytes 0.7 0.0 - 1.3 10e3/uL    Absolute Eosinophils 0.4 0.0 - 0.7 10e3/uL    Absolute Basophils 0.1 0.0 - 0.2 10e3/uL    Absolute Immature Granulocytes 0.0 <=0.4 10e3/uL    Absolute NRBCs 0.0 10e3/uL   Lactic acid whole blood     Status: Abnormal   Result Value Ref Range    Lactic Acid 2.1 (H) 0.7 - 2.0 mmol/L   CBC with platelets differential     Status: None    Narrative    The following orders were created for panel order CBC with platelets differential.  Procedure                               Abnormality         Status                     ---------                               -----------         ------                     CBC with platelets and d...[[id number]]                      Final result                 Please view results for these tests on the individual orders.     Medications   0.9% sodium chloride BOLUS (0 mLs Intravenous Stopped 10/8/22 2238)     Followed by   sodium chloride 0.9% infusion ( Intravenous Canceled Entry 10/8/22 2256)   0.9% sodium chloride BOLUS (0 mLs Intravenous Stopped 10/9/22 0030)     Followed by   sodium chloride 0.9% infusion (has no administration in time range)   prochlorperazine (COMPAZINE) injection 5 mg (5 mg Intravenous Given 10/8/22 2023)   fentaNYL (PF) (SUBLIMAZE) injection 25 mcg (25 mcg Intravenous Given 10/8/22 2025)        Assessments & Plan (with Medical Decision Making)   Nevin Alvarado is a 31 year old female with significant psychiatric comorbidities including self-injurious behavior as well as swallowing foreign bodies who presents with abdominal pain, dizziness, and nausea.    IV access was established labs were drawn and sent for analysis which showed no evidence of leukocytosis WBC at  9.1, however lactic acid is 2.3.  Hemoglobin normal at 12.4.  No electrolyte abnormalities.  Normal renal function.  Lipase 28.  Pt states she is not sexually active.    Per her care plan she was also placed on a one-to-one for her safety based on a past medical history of self-injurious behavior during her emergency department stay.    Patient was given 1 L normal saline bolus as well as IV Compazine 5 mg and IV fentanyl 25 mcg.    I have reviewed the nursing notes. I have reviewed the findings, diagnosis, plan and need for follow up with the patient.    Repeat lactate after 2L of IV NS is improving and down to 2.1. Repeat abdominal exam is benign. She is feeling better and tolerating oral liquids without difficulty. She will be discharged to home with f/u with her PCP in the next 2-3 days.    New Prescriptions    No medications on file       Final diagnoses:   Dizziness   Dehydration       --  [facility] EMERGENCY DEPARTMENT  10/8/2022     Dejon Mathew MD  10/09/22 0037

## 2022-10-11 ENCOUNTER — HOSPITAL ENCOUNTER (OUTPATIENT)
Dept: SPEECH THERAPY | Facility: CLINIC | Age: 32
Discharge: HOME | End: 2022-10-11
Attending: EMERGENCY MEDICINE
Payer: COMMERCIAL

## 2022-10-11 DIAGNOSIS — J38.01 PARESIS OF LEFT VOCAL CORD: ICD-10-CM

## 2022-10-11 DIAGNOSIS — R49.0 DYSPHONIA: Primary | ICD-10-CM

## 2022-10-11 PROCEDURE — 92524 BEHAVRAL QUALIT ANALYS VOICE: CPT | Mod: GN | Performed by: STUDENT IN AN ORGANIZED HEALTH CARE EDUCATION/TRAINING PROGRAM

## 2022-10-12 ENCOUNTER — HOSPITAL ENCOUNTER (EMERGENCY)
Facility: CLINIC | Age: 32
Discharge: HOME | End: 2022-10-12
Admitting: EMERGENCY MEDICINE
Payer: COMMERCIAL

## 2022-10-12 VITALS
SYSTOLIC BLOOD PRESSURE: 136 MMHG | HEART RATE: 90 BPM | WEIGHT: 293 LBS | HEIGHT: 62 IN | BODY MASS INDEX: 53.92 KG/M2 | OXYGEN SATURATION: 100 % | TEMPERATURE: 97.5 F | RESPIRATION RATE: 16 BRPM | DIASTOLIC BLOOD PRESSURE: 64 MMHG

## 2022-10-12 LAB
ALBUMIN SERPL BCG-MCNC: 4.1 G/DL (ref 3.5–5.2)
ALP SERPL-CCNC: 66 U/L (ref 35–104)
ALT SERPL W P-5'-P-CCNC: 36 U/L (ref 10–35)
ANION GAP SERPL CALCULATED.3IONS-SCNC: 11 MMOL/L (ref 7–15)
AST SERPL W P-5'-P-CCNC: ABNORMAL U/L
BASOPHILS # BLD AUTO: 0.1 10E3/UL (ref 0–0.2)
BASOPHILS NFR BLD AUTO: 1 %
BILIRUB SERPL-MCNC: 0.2 MG/DL
BUN SERPL-MCNC: 13.3 MG/DL (ref 6–20)
CALCIUM SERPL-MCNC: 9.6 MG/DL (ref 8.6–10)
CHLORIDE SERPL-SCNC: 103 MMOL/L (ref 98–107)
CREAT SERPL-MCNC: 0.52 MG/DL (ref 0.51–0.95)
DEPRECATED HCO3 PLAS-SCNC: 26 MMOL/L (ref 22–29)
EOSINOPHIL # BLD AUTO: 0.3 10E3/UL (ref 0–0.7)
EOSINOPHIL NFR BLD AUTO: 4 %
ERYTHROCYTE [DISTWIDTH] IN BLOOD BY AUTOMATED COUNT: 13.2 % (ref 10–15)
GFR SERPL CREATININE-BSD FRML MDRD: >90 ML/MIN/1.73M2
GLUCOSE SERPL-MCNC: 125 MG/DL (ref 70–99)
HCG SERPL QL: NEGATIVE
HCT VFR BLD AUTO: 37.9 % (ref 35–47)
HGB BLD-MCNC: 12.6 G/DL (ref 11.7–15.7)
HOLD SPECIMEN: NORMAL
HOLD SPECIMEN: NORMAL
IMM GRANULOCYTES # BLD: 0.1 10E3/UL
IMM GRANULOCYTES NFR BLD: 1 %
LYMPHOCYTES # BLD AUTO: 1.7 10E3/UL (ref 0.8–5.3)
LYMPHOCYTES NFR BLD AUTO: 22 %
MCH RBC QN AUTO: 29.1 PG (ref 26.5–33)
MCHC RBC AUTO-ENTMCNC: 33.2 G/DL (ref 31.5–36.5)
MCV RBC AUTO: 88 FL (ref 78–100)
MONOCYTES # BLD AUTO: 0.5 10E3/UL (ref 0–1.3)
MONOCYTES NFR BLD AUTO: 6 %
NEUTROPHILS # BLD AUTO: 5.2 10E3/UL (ref 1.6–8.3)
NEUTROPHILS NFR BLD AUTO: 66 %
NRBC # BLD AUTO: 0 10E3/UL
NRBC BLD AUTO-RTO: 0 /100
PLAT MORPH BLD: ABNORMAL
PLATELET # BLD AUTO: 252 10E3/UL (ref 150–450)
POLYCHROMASIA BLD QL SMEAR: SLIGHT
POTASSIUM SERPL-SCNC: 4.4 MMOL/L (ref 3.4–5.3)
PROT SERPL-MCNC: 6.7 G/DL (ref 6.4–8.3)
RBC # BLD AUTO: 4.33 10E6/UL (ref 3.8–5.2)
RBC MORPH BLD: ABNORMAL
SODIUM SERPL-SCNC: 140 MMOL/L (ref 136–145)
WBC # BLD AUTO: 7.8 10E3/UL (ref 4–11)

## 2022-10-12 PROCEDURE — 84703 CHORIONIC GONADOTROPIN ASSAY: CPT | Performed by: EMERGENCY MEDICINE

## 2022-10-12 PROCEDURE — 84155 ASSAY OF PROTEIN SERUM: CPT | Performed by: EMERGENCY MEDICINE

## 2022-10-12 PROCEDURE — 36415 COLL VENOUS BLD VENIPUNCTURE: CPT | Performed by: EMERGENCY MEDICINE

## 2022-10-12 PROCEDURE — 999N000104 HC STATISTIC NO CHARGE: Performed by: EMERGENCY MEDICINE

## 2022-10-12 PROCEDURE — 85004 AUTOMATED DIFF WBC COUNT: CPT | Performed by: EMERGENCY MEDICINE

## 2022-10-12 NOTE — PROGRESS NOTES
[facility]      OUTPATIENT SPEECH LANGUAGE PATHOLOGY VOICE EVALUATION  PLAN OF TREATMENT FOR OUTPATIENT REHABILITATION  (COMPLETE FOR INITIAL CLAIMS ONLY)    Patient's Last Name, First Name, M.I.  YOB: 1990  KlausNevin thompson  ZAN                        Provider s Name: [facility] Medical Record No.  [id number]     Onset Date:  8/22/2022 (order date)    Start of Care Date:  10/11/2022   Type:     ___PT  __OT   _X_SLP    Medical Diagnosis: Paresis of left vocal cord   Speech Language Pathology Diagnosis:  Dysphonia    Visits from SOC: 1      _________________________________________________________________________________  Plan of Treatment/Functional Goals:  Voice         Goals     1. Goal Identifier: Generalization       Goal Description: Patient will report a week of typical activities in which dysphonia and vocal effort do not exceed a level of 2 out of 10, 90% of the time, so that patient is able to meet her vocal demands for daily conversations.       Target Date: 01/09/23   2. Goal Identifier: Voice quality       Goal Description: In a 20-minute speech task, patient will demonstrate roughness, breathiness, strain, and pitch instability that do not exceed a level of 2 out of 10, 90% of the time by SLP judgment, so that patient is able to meet her voice quality demands.       Target Date: 01/09/23   3. Goal Identifier: Vocal function       Goal Description: In order to improve laryngeal strength, flexibility, and coordination for daily vocal tasks, patient will extend average pitch range up to G5 in exercises 2/3 of modified Vocal Function Exercises and average maximum phonation time in exercise 4 to 10 seconds without strain, roughness, breathiness, or pitch instability when given min assist.       Target Date: 01/09/23             Frequency and Duration:  1x/week for 6 weeks with 1-2 monthly follow-ups  Anjali Kim, SLP       I CERTIFY THE NEED FOR THESE SERVICES FURNISHED UNDER        THIS PLAN OF TREATMENT AND WHILE UNDER MY CARE .            Physician Signature               Date    X_____________________________________________________                      Certification Date From:  10/11/22  Certification Date To:  01/09/23  Referring Provider: Inessa Barlow MD  PCP: Latonya Knight MD                 Initial Assessment        See Epic Evaluation Start of Care

## 2022-10-12 NOTE — PROGRESS NOTES
Speech-Language Pathology Department   VOICE EVALUATION  [facility]    10/11/22 1230   General Information   Type Of Visit Initial   Start Of Care Date 10/11/22   Referring Physician Inessa Barlow MD   Orders Evaluate And Treat   Medical Diagnosis Paresis of left vocal cord   Onset Of Illness/injury Or Date Of Surgery 08/22/22  (order date)   Precautions/Limitations  fall precautions  (Pt using a walker to assist with ambulation)   Hearing WFL for 1:1 conversation in session   Avocational voice uses Pt enjoyed singing prior to onset of voice problems.   Surgical/Medical history reviewed Yes   Pertinent History Of Current Problem 32yo female with complex PMH including frequent foreign body ingestion with numerous surgeries to remove objects and Hx esophageal perforation in 2019, GERD, complex psychiatric history including boderline personality disorder, anxiety/depression, and PTSD, ZOHRA on CPAP, Hx PE in November 2019, and asthma presenting with dysphonia.  Pt first became hoarse in May 2022 following an illness, reporting that she lost her voice completely for 3 weeks.  Pt was diagnosed with left vocal fold paresis by ENT in the ED on 8/22/22.  Since then, pt reports that her voice is hoarse all the time and sounds worse the more she talks.  She also feels that she has to breathe more frequently when she talks.  She has been unable to sing, noting reduced range in both her higher and lower pitches.  She experiences occasional pain in her throat.  She will get a tickle sensation in her throat at night, and will cough to the point of near emesis.  She notes that food will occasionally get stuck in her throat and she will have to vomit to bring it up.  Please see chart for additional PMH.   Prior Level of Functioning No previous problems.   Living environment Group home   Patient/family Goals To improve her voice quality as much as possible   Voice Profile during  conversation, 1 min monologue and paragraph reading   Voice Quality Raspy;Scratchy;Airy   Voice quality comments SPEECH: Consistent mild breathiness with intermittent mild-moderate strain and roughness.  SINGING: Intermittent mild roughness with increased strain at higher pitches.  THERAPY PROBES: Improved voice quality with flow mode, semi-occluded vocal tract, and glottal closure probes.   Voice quality severity rating continuum (1=Severe, 7=WNL) 5  (CAPE-V Overall Severity: 25/100)   Breath control Tight   Breath Control comments Inspirations for speech are shallow and mildly audible, with poor respiratory/phonatory coordination.   Breath control severity rating continuum (1=Severe, 7=WNL) 5   Voice Use / Effort Throat push   Voice Use / Effort comments Pt rates her current phonatory effort for speech as 5/10 (10 is maximum effort, noting that effort increases with voice use.   Voice use / Effort severity rating continuum (1=Severe, 7=WNL) 5   Fundamental frequency (Hz) 196 Hz  (Centered around G3)   Pitch / Frequency comments Intermittent mild pitch instability observed in both speaking and singing tasks.  Reduced pitch range in both upper and lower registers observed.   Pitch / Frequency severity rating continuum (1=Severe, 7=WNL) 6   Volume comments Volume for conversational speech is WFL and appropriate for the setting (1:1 conversation in quiet room).  A whisper is normal.  Soft phonation is mildly breathy.  Loud phonation is strained, mildly rough, and reduced in volume increase.   Volume severity rating continuum (1=Severe, 7=WNL) 6   Neuromuscular Control WNL   Neuromuscular Control severity rating continuum (1=Severe, 7=WNL) 7   Resonance Muffled   Resonance severity rating continuum (1=Severe, 7=WNL) 6   Comments Mild-moderate dysphonia characterized by roughness, breathiness, strain, pitch instability, reduced pitch range, poor respiratory/phonatory coordination, muffled resonance, and increased  phonatory effort for speech.   Adduction /Abduction Function   Laryngeal diadokinetic speed   (WNL)   Laryngeal diadokinetic strength Sharp;Clear   Laryngeal diadokinetic consistency Regular   Adduction / Abduction function scale Age 11 - 65, norm per sec:  5+   Vibratory Function of Vocal Folds   Prolonged 'ah' at mid pitch (sec) 6.8 seconds at A3 with consistent mild breathiness   Prolonged 'ah' at high pitch (sec) 6.7 seconds at Ab4 with consistent mild breathiness and strain   Vibratory Function of Vocal Folds Scale Females 20 - 80 yrs: 10 - 22 secs   Vibratory Function of Vocal Folds Comments Reduced in duration and quality   Function of Lengtheners / Shorteners (CT and TA Muscles)   Pitch glides Limited range  (Lowest: G3; Highest: F5)   Videostroboscopy / Endoscopy   Other observations Laryngoscopy completed by ENT in the ED on 8/22/22 significant for left vocal fold paresis.   General Therapy Interventions   Planned Therapy Interventions Voice   Voice Breath flow to sound flow;Voice quality/pitch or volume tasks;Resonant voice techniques;Larynx strengthening;Larynx and TVF flexibility   Impressions and Recommendations   Communication Diagnosis Dysphonia   Summary Ms. Alvarado presents with mild-moderate dysphonia characterized by roughness, breathiness, strain, pitch instability, reduced pitch range, poor respiratory/phonatory coordination, muffled resonance, and increased phonatory effort for speech.  Based on today's evaluation and previous laryngoscopy with ENT, dysphonia is likely accounted for ongoing limited mobility of the left vocal fold.  Pt is scheduled to see ENT on 10/24, after which we will have a better picture of her current laryngeal function.   Recommendations A course of skilled speech therapy is recommended to optimize vocal technique, improve voice quality, and promote reduced laryngeal effort, fatigue, discomfort, and irritation, so that patient is able to meet her vocal demands for  daily conversations.   Frequency and Duration 1x/week for 6 weeks with 1-2 monthly follow-ups   Prognosis  Good with intervention   Risks and Benefits of Treatment have been explained. Yes   Patient & /or Caregiver  in agreement with plan of care Yes   Patient Education SLP provided education regarding evaluation findings and proposed POC.  SLP recommending that pt practice gentle singing and semi-occluded vocal tract (SOVT) lip trill glides throughout her pitch range as a form of  vocal exercise until therapy can be initiated.   Educational Assessment   Barriers to Learning No barriers   Preferred Learning Style Listening;Reading;Demonstration;Pictures / Video   Voice Goals   Voice Goals 1;2;3   Voice Goal 1   Goal Identifier Generalization   Goal Description Patient will report a week of typical activities in which dysphonia and vocal effort do not exceed a level of 2 out of 10, 90% of the time, so that patient is able to meet her vocal demands for daily conversations.   Target Date 01/09/23   Voice Goal 2   Goal Identifier Voice quality   Goal Description In a 20-minute speech task, patient will demonstrate roughness, breathiness, strain, and pitch instability that do not exceed a level of 2 out of 10, 90% of the time by SLP judgment, so that patient is able to meet her voice quality demands.   Target Date 01/09/23   Voice Goal 3   Goal Identifier Vocal function   Goal Description In order to improve laryngeal strength, flexibility, and coordination for daily vocal tasks, patient will extend average pitch range up to G5 in exercises 2/3 of modified Vocal Function Exercises and average maximum phonation time in exercise 4 to 10 seconds without strain, roughness, breathiness, or pitch instability when given min assist.   Target Date 01/09/23   Total Session Time   Voice Minutes ([id number]) 35   Total Evaluation Time 35   Therapy Certification   Certification date from 10/11/22   Certification date to 01/09/23   Medical  Diagnosis Paresis of left vocal cord     Thank you for the referral of this patient.    MELIDA Rowley (LORAINE olea, CCC-SLP  Speech-Language Pathologist  [facility] Certificate of Vocology  [facility]  [phone]

## 2022-10-12 NOTE — ED TRIAGE NOTES
Pt presents to ER with C/O heavy vaginal bleeding with clots x 3 days. Saturating a pad every 2 hours. Woke up feeling dizzy today. Left lower abdominal/pelvic pain radiating to left back. No dysuria. No fever or chills. No nausea or vomiting. No vaginal discharge. Irregular menses. No sexual activity since 2018     Triage Assessment     Row Name 10/12/22 0933       Triage Assessment (Adult)    Airway WDL WDL       Respiratory WDL    Respiratory WDL WDL       Skin Circulation/Temperature WDL    Skin Circulation/Temperature WDL WDL       Cardiac WDL    Cardiac WDL WDL       Peripheral/Neurovascular WDL    Peripheral Neurovascular WDL WDL       Cognitive/Neuro/Behavioral WDL    Cognitive/Neuro/Behavioral WDL WDL

## 2022-10-15 ENCOUNTER — HOSPITAL ENCOUNTER (EMERGENCY)
Facility: CLINIC | Age: 32
Discharge: HOME | End: 2022-10-16
Attending: EMERGENCY MEDICINE | Admitting: EMERGENCY MEDICINE
Payer: COMMERCIAL

## 2022-10-15 DIAGNOSIS — R10.31 RLQ ABDOMINAL PAIN: ICD-10-CM

## 2022-10-15 LAB
ABO/RH(D): ABNORMAL
ALBUMIN SERPL-MCNC: 3.9 G/DL (ref 3.5–5)
ALP SERPL-CCNC: 65 U/L (ref 45–120)
ALT SERPL W P-5'-P-CCNC: 40 U/L (ref 0–45)
ANION GAP SERPL CALCULATED.3IONS-SCNC: 12 MMOL/L (ref 5–18)
ANTIBODY SCREEN: POSITIVE
AST SERPL W P-5'-P-CCNC: 36 U/L (ref 0–40)
BASOPHILS # BLD AUTO: 0.1 10E3/UL (ref 0–0.2)
BASOPHILS NFR BLD AUTO: 1 %
BILIRUB SERPL-MCNC: 0.2 MG/DL (ref 0–1)
BUN SERPL-MCNC: 10 MG/DL (ref 8–22)
CALCIUM SERPL-MCNC: 9.2 MG/DL (ref 8.5–10.5)
CHLORIDE BLD-SCNC: 105 MMOL/L (ref 98–107)
CO2 SERPL-SCNC: 26 MMOL/L (ref 22–31)
CREAT SERPL-MCNC: 0.7 MG/DL (ref 0.6–1.1)
EOSINOPHIL # BLD AUTO: 0.3 10E3/UL (ref 0–0.7)
EOSINOPHIL NFR BLD AUTO: 4 %
ERYTHROCYTE [DISTWIDTH] IN BLOOD BY AUTOMATED COUNT: 13.3 % (ref 10–15)
GFR SERPL CREATININE-BSD FRML MDRD: >90 ML/MIN/1.73M2
GLUCOSE BLD-MCNC: 122 MG/DL (ref 70–125)
HCT VFR BLD AUTO: 35.5 % (ref 35–47)
HGB BLD-MCNC: 11.7 G/DL (ref 11.7–15.7)
IMM GRANULOCYTES # BLD: 0 10E3/UL
IMM GRANULOCYTES NFR BLD: 0 %
INR PPP: 1.03 (ref 0.85–1.15)
LIPASE SERPL-CCNC: 25 U/L (ref 0–52)
LYMPHOCYTES # BLD AUTO: 2 10E3/UL (ref 0.8–5.3)
LYMPHOCYTES NFR BLD AUTO: 30 %
MCH RBC QN AUTO: 29 PG (ref 26.5–33)
MCHC RBC AUTO-ENTMCNC: 33 G/DL (ref 31.5–36.5)
MCV RBC AUTO: 88 FL (ref 78–100)
MONOCYTES # BLD AUTO: 0.6 10E3/UL (ref 0–1.3)
MONOCYTES NFR BLD AUTO: 9 %
NEUTROPHILS # BLD AUTO: 3.8 10E3/UL (ref 1.6–8.3)
NEUTROPHILS NFR BLD AUTO: 56 %
NRBC # BLD AUTO: 0 10E3/UL
NRBC BLD AUTO-RTO: 0 /100
PLATELET # BLD AUTO: 283 10E3/UL (ref 150–450)
POTASSIUM BLD-SCNC: 3.8 MMOL/L (ref 3.5–5)
PROT SERPL-MCNC: 6.8 G/DL (ref 6–8)
RBC # BLD AUTO: 4.03 10E6/UL (ref 3.8–5.2)
SODIUM SERPL-SCNC: 143 MMOL/L (ref 136–145)
SPECIMEN EXPIRATION DATE: ABNORMAL
WBC # BLD AUTO: 6.7 10E3/UL (ref 4–11)

## 2022-10-15 PROCEDURE — 250N000011 HC RX IP 250 OP 636: Performed by: EMERGENCY MEDICINE

## 2022-10-15 PROCEDURE — 96375 TX/PRO/DX INJ NEW DRUG ADDON: CPT

## 2022-10-15 PROCEDURE — 85610 PROTHROMBIN TIME: CPT | Performed by: EMERGENCY MEDICINE

## 2022-10-15 PROCEDURE — 258N000003 HC RX IP 258 OP 636: Performed by: EMERGENCY MEDICINE

## 2022-10-15 PROCEDURE — 86870 RBC ANTIBODY IDENTIFICATION: CPT | Performed by: EMERGENCY MEDICINE

## 2022-10-15 PROCEDURE — 83690 ASSAY OF LIPASE: CPT | Performed by: EMERGENCY MEDICINE

## 2022-10-15 PROCEDURE — 36415 COLL VENOUS BLD VENIPUNCTURE: CPT | Performed by: EMERGENCY MEDICINE

## 2022-10-15 PROCEDURE — 99285 EMERGENCY DEPT VISIT HI MDM: CPT | Mod: 25

## 2022-10-15 PROCEDURE — 86901 BLOOD TYPING SEROLOGIC RH(D): CPT | Performed by: EMERGENCY MEDICINE

## 2022-10-15 PROCEDURE — 96361 HYDRATE IV INFUSION ADD-ON: CPT

## 2022-10-15 PROCEDURE — 96374 THER/PROPH/DIAG INJ IV PUSH: CPT | Mod: 59

## 2022-10-15 PROCEDURE — 85025 COMPLETE CBC W/AUTO DIFF WBC: CPT | Performed by: EMERGENCY MEDICINE

## 2022-10-15 PROCEDURE — 80053 COMPREHEN METABOLIC PANEL: CPT | Performed by: EMERGENCY MEDICINE

## 2022-10-15 PROCEDURE — 82040 ASSAY OF SERUM ALBUMIN: CPT | Performed by: EMERGENCY MEDICINE

## 2022-10-15 RX ORDER — MORPHINE SULFATE 4 MG/ML
4 INJECTION, SOLUTION INTRAMUSCULAR; INTRAVENOUS ONCE
Status: COMPLETED | OUTPATIENT
Start: 2022-10-16 | End: 2022-10-15

## 2022-10-15 RX ORDER — MORPHINE SULFATE 4 MG/ML
4 INJECTION, SOLUTION INTRAMUSCULAR; INTRAVENOUS
Status: DISCONTINUED | OUTPATIENT
Start: 2022-10-15 | End: 2022-10-15

## 2022-10-15 RX ORDER — SODIUM CHLORIDE 9 MG/ML
INJECTION, SOLUTION INTRAVENOUS CONTINUOUS
Status: DISCONTINUED | OUTPATIENT
Start: 2022-10-15 | End: 2022-10-16 | Stop reason: HOSPADM

## 2022-10-15 RX ORDER — ONDANSETRON 2 MG/ML
4 INJECTION INTRAMUSCULAR; INTRAVENOUS EVERY 30 MIN PRN
Status: DISCONTINUED | OUTPATIENT
Start: 2022-10-15 | End: 2022-10-16 | Stop reason: HOSPADM

## 2022-10-15 RX ADMIN — MORPHINE SULFATE 4 MG: 4 INJECTION, SOLUTION INTRAMUSCULAR; INTRAVENOUS at 23:48

## 2022-10-15 RX ADMIN — ONDANSETRON 4 MG: 2 INJECTION INTRAMUSCULAR; INTRAVENOUS at 23:26

## 2022-10-15 RX ADMIN — SODIUM CHLORIDE 1000 ML: 9 INJECTION, SOLUTION INTRAVENOUS at 23:20

## 2022-10-15 ASSESSMENT — ACTIVITIES OF DAILY LIVING (ADL): ADLS_ACUITY_SCORE: 38

## 2022-10-15 ASSESSMENT — ENCOUNTER SYMPTOMS
BRUISES/BLEEDS EASILY: 1
ABDOMINAL PAIN: 1
NAUSEA: 1

## 2022-10-15 NOTE — ED TRIAGE NOTES
Pt. Comes by EMS, Reports RLQ abdominal pain, diarrhea, and heavy vaginal bleeding with current period for the last 2 days. Pt. Has some nausea, no vomiting. Pt. Lives in [facility], comes with caregiver, and has legal guardian.

## 2022-10-16 ENCOUNTER — APPOINTMENT (OUTPATIENT)
Dept: ULTRASOUND IMAGING | Facility: CLINIC | Age: 32
End: 2022-10-16
Attending: EMERGENCY MEDICINE
Payer: COMMERCIAL

## 2022-10-16 ENCOUNTER — APPOINTMENT (OUTPATIENT)
Dept: CT IMAGING | Facility: CLINIC | Age: 32
End: 2022-10-16
Attending: EMERGENCY MEDICINE
Payer: COMMERCIAL

## 2022-10-16 VITALS
DIASTOLIC BLOOD PRESSURE: 83 MMHG | SYSTOLIC BLOOD PRESSURE: 140 MMHG | BODY MASS INDEX: 53.92 KG/M2 | HEART RATE: 108 BPM | OXYGEN SATURATION: 94 % | RESPIRATION RATE: 18 BRPM | HEIGHT: 62 IN | WEIGHT: 293 LBS | TEMPERATURE: 99.4 F

## 2022-10-16 LAB
ANTIBODY UNIDENTIFIED: NORMAL
HCG UR QL: NEGATIVE
SPECIMEN EXPIRATION DATE: NORMAL

## 2022-10-16 PROCEDURE — 250N000011 HC RX IP 250 OP 636: Performed by: EMERGENCY MEDICINE

## 2022-10-16 PROCEDURE — 86901 BLOOD TYPING SEROLOGIC RH(D): CPT | Performed by: EMERGENCY MEDICINE

## 2022-10-16 PROCEDURE — 76830 TRANSVAGINAL US NON-OB: CPT

## 2022-10-16 PROCEDURE — 258N000003 HC RX IP 258 OP 636: Performed by: EMERGENCY MEDICINE

## 2022-10-16 PROCEDURE — 96375 TX/PRO/DX INJ NEW DRUG ADDON: CPT

## 2022-10-16 PROCEDURE — 36415 COLL VENOUS BLD VENIPUNCTURE: CPT | Performed by: EMERGENCY MEDICINE

## 2022-10-16 PROCEDURE — 74177 CT ABD & PELVIS W/CONTRAST: CPT

## 2022-10-16 PROCEDURE — 86860 RBC ANTIBODY ELUTION: CPT | Performed by: EMERGENCY MEDICINE

## 2022-10-16 PROCEDURE — 84999 UNLISTED CHEMISTRY PROCEDURE: CPT | Performed by: EMERGENCY MEDICINE

## 2022-10-16 PROCEDURE — 81025 URINE PREGNANCY TEST: CPT | Performed by: EMERGENCY MEDICINE

## 2022-10-16 PROCEDURE — 86880 COOMBS TEST DIRECT: CPT | Performed by: EMERGENCY MEDICINE

## 2022-10-16 PROCEDURE — 96361 HYDRATE IV INFUSION ADD-ON: CPT

## 2022-10-16 PROCEDURE — 86900 BLOOD TYPING SEROLOGIC ABO: CPT | Performed by: EMERGENCY MEDICINE

## 2022-10-16 PROCEDURE — 86870 RBC ANTIBODY IDENTIFICATION: CPT | Performed by: EMERGENCY MEDICINE

## 2022-10-16 RX ORDER — KETOROLAC TROMETHAMINE 15 MG/ML
15 INJECTION, SOLUTION INTRAMUSCULAR; INTRAVENOUS ONCE
Status: COMPLETED | OUTPATIENT
Start: 2022-10-16 | End: 2022-10-16

## 2022-10-16 RX ORDER — IBUPROFEN 600 MG/1
600 TABLET, FILM COATED ORAL EVERY 8 HOURS PRN
Qty: 24 TABLET | Refills: 0 | Status: SHIPPED | OUTPATIENT
Start: 2022-10-16 | End: 2022-10-18

## 2022-10-16 RX ORDER — ONDANSETRON 4 MG/1
4 TABLET, ORALLY DISINTEGRATING ORAL EVERY 8 HOURS PRN
Qty: 10 TABLET | Refills: 0 | Status: SHIPPED | OUTPATIENT
Start: 2022-10-16 | End: 2022-10-18

## 2022-10-16 RX ORDER — IOPAMIDOL 755 MG/ML
100 INJECTION, SOLUTION INTRAVASCULAR ONCE
Status: COMPLETED | OUTPATIENT
Start: 2022-10-16 | End: 2022-10-16

## 2022-10-16 RX ADMIN — IOPAMIDOL 100 ML: 755 INJECTION, SOLUTION INTRAVENOUS at 01:15

## 2022-10-16 RX ADMIN — KETOROLAC TROMETHAMINE 15 MG: 15 INJECTION, SOLUTION INTRAMUSCULAR; INTRAVENOUS at 01:58

## 2022-10-16 RX ADMIN — SODIUM CHLORIDE: 9 INJECTION, SOLUTION INTRAVENOUS at 02:13

## 2022-10-16 ASSESSMENT — ACTIVITIES OF DAILY LIVING (ADL)
ADLS_ACUITY_SCORE: 40
ADLS_ACUITY_SCORE: 40

## 2022-10-16 NOTE — DISCHARGE INSTRUCTIONS
Fortunately all of your testing today has been normal.  Follow-up closely with your primary care doctor and return to the ER for any worsening symptoms or other concerns.

## 2022-10-16 NOTE — ED NOTES
EMERGENCY DEPARTMENT SIGN OUT NOTE        ED COURSE AND MEDICAL DECISION MAKING  Patient was signed out to me by Dr Estrada Beltrán at 12:23 AM  3:11 AM Checked in on and updated patient. I discussed the plan for discharge with the patient, and patient is agreeable.  We discussed supportive cares at home and reasons for return to the ER including new or worsening symptoms - all questions and concerns addressed.  Patient to be discharged by RN.     In brief, Nevin Alvarado is a 31 year old female who initially presented for abdominal pain.   Patent comes in with RLQ abdominal pain that started this morning. She woke up with mild abdominal pain that slowly got worse throughout the day and lead to nasuea. Patient also says that she has been having a lot of bleeding from her menstrual cycle that has lasted over a week.    The patient CT returned showing no acute process.  A pelvic ultrasound was ordered for further evaluation.  This shows a small left ovarian cyst but no other acute findings.  The patient is feeling better and I feel she can be safely discharged home.  She is comfortable with this plan.      FINAL IMPRESSION    1. RLQ abdominal pain        ED MEDS  Medications   0.9% sodium chloride BOLUS (0 mLs Intravenous Stopped 10/16/22 0159)   morphine (PF) injection 4 mg (4 mg Intravenous Given 10/15/22 2348)   iopamidol (ISOVUE-370) solution 100 mL (100 mLs Intravenous Given 10/16/22 0115)   ketorolac (TORADOL) injection 15 mg (15 mg Intravenous Given 10/16/22 0158)       LAB  Labs Ordered and Resulted from Time of ED Arrival to Time of ED Departure   TYPE AND SCREEN, ADULT - Abnormal       Result Value    ABO/RH(D) O POS      Antibody Screen Positive (*)     SPECIMEN EXPIRATION DATE [id number]     INR - Normal    INR 1.03     COMPREHENSIVE METABOLIC PANEL - Normal    Sodium 143      Potassium 3.8      Chloride 105      Carbon Dioxide (CO2) 26      Anion Gap 12      Urea Nitrogen 10      Creatinine 0.70       Calcium 9.2      Glucose 122      Alkaline Phosphatase 65      AST 36      ALT 40      Protein Total 6.8      Albumin 3.9      Bilirubin Total 0.2      GFR Estimate >90     LIPASE - Normal    Lipase 25     HCG QUALITATIVE URINE - Normal    hCG Urine Qualitative Negative     CBC WITH PLATELETS AND DIFFERENTIAL    WBC Count 6.7      RBC Count 4.03      Hemoglobin 11.7      Hematocrit 35.5      MCV 88      MCH 29.0      MCHC 33.0      RDW 13.3      Platelet Count 283      % Neutrophils 56      % Lymphocytes 30      % Monocytes 9      % Eosinophils 4      % Basophils 1      % Immature Granulocytes 0      NRBCs per 100 WBC 0      Absolute Neutrophils 3.8      Absolute Lymphocytes 2.0      Absolute Monocytes 0.6      Absolute Eosinophils 0.3      Absolute Basophils 0.1      Absolute Immature Granulocytes 0.0      Absolute NRBCs 0.0     LABORATORY MISCELLANEOUS ORDER   ANTIBODY IDENTIFICATION    ANTIBODY UNIDENTIFIED REFERRED FOR I.D.      SPECIMEN EXPIRATION DATE [id number]     ABO/RH TYPE AND SCREEN     RADIOLOGY    US Pelvic Transabdominal and Transvaginal   Final Result   IMPRESSION:   1.  Small left paraovarian cyst. Otherwise unremarkable pelvis.               CT Abdomen Pelvis w Contrast   Final Result   IMPRESSION:    1.  Previous appendectomy      2.  Fatty liver.      3.  Cholecystectomy with mild common bile duct dilatation likely due to the postcholecystectomy state.      4.  Scoliosis.            DO KENNY [facility] EMERGENCY ROOM  [address] [id number] [phone]     Nish Jung MD  10/17/22 9024

## 2022-10-16 NOTE — ED PROVIDER NOTES
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     Vancomycin Itching, Swelling and Rash     Other reaction(s): Redness  Flushed face, very itchy; HUT Comment: Flushed face, very itchy; HUT Reaction: Angioedema; HUT Reaction: Redness; HUT Severity: Med; HUT Noted: [id number]    facial     Hydrocodone Nausea and Vomiting and GI Disturbance     vomiting for days, PN: vomiting for days; HUT Comment: vomiting for days; HUT Reaction: Gastrointestinal; HUT  "Reaction: Nausea And Vomiting; HUT Reaction Type: Intolerance; HUT Severity: Med; HUT Noted: [id number]  vomiting for days       Blood-Group Specific Substance Other (See Comments)     Patient has an anti-Cw and non-specific antibodies. Blood product orders may be delayed. Draw one red top and two purple top tubes for all type/screen/crossmatch orders.  Patient has anti-Cw, anti-K (Angella), Warm auto and nonspecific antibodies. Blood products may be delayed. Draw patient 24 hours prior to transfusion. Draw one red top and two purple top tubes for all type and screen orders.     Clavulanic Acid Angioedema     Fentanyl Itching     Naltrexone Other (See Comments)     Reaction(s): Vivid dreams.     Other Drug Allergy (See Comments)      See original file MRN [id number]. Files are marked for merge     Oxycodone Swelling     Adhesive Tape Rash     Silicone type     Band-Aid Anti-Itch      Other reaction(s): adhesive allergy     Cephalosporins Rash     Lamotrigine Rash     Possibly associated with Lamictal.   HUT Comment: Possibly associated with Lamictal. ; HUT Reaction: Rash; HUT Reaction Type: Allergy; HUT Severity: Low; HUT Noted: [id number]       Review of Systems     Review of Systems   Cardiovascular: Negative for chest pain.   Gastrointestinal: Positive for abdominal pain (RLQ) and nausea.   Genitourinary: Positive for menstrual problem.   Hematological: Bruises/bleeds easily.   All other systems reviewed and are negative.       Remainder of systems reviewed, unless noted in HPI all others negative.    Physical Exam     /65   Pulse 105   Temp 99.4  F (37.4  C) (Temporal)   Resp 18   Ht 1.575 m (5' 2\")   Wt 136.5 kg (301 lb)   LMP 10/07/2022 (Approximate)   SpO2 95%   BMI 55.05 kg/m      Physical Exam  Vitals and nursing note reviewed.   HENT:      Head: Normocephalic.      Nose: Nose normal.   Cardiovascular:      Rate and Rhythm: Normal rate.   Pulmonary:      Effort: Pulmonary effort is normal. "   Abdominal:      Tenderness: There is abdominal tenderness in the right lower quadrant. There is no guarding or rebound.   Neurological:      Mental Status: She is alert. Mental status is at baseline.   Psychiatric:         Mood and Affect: Mood normal.             Labs & Imaging         Labs Ordered and Resulted from Time of ED Arrival to Time of ED Departure   CBC WITH PLATELETS AND DIFFERENTIAL       Result Value    WBC Count 6.7      RBC Count 4.03      Hemoglobin 11.7      Hematocrit 35.5      MCV 88      MCH 29.0      MCHC 33.0      RDW 13.3      Platelet Count 283      % Neutrophils 56      % Lymphocytes 30      % Monocytes 9      % Eosinophils 4      % Basophils 1      % Immature Granulocytes 0      NRBCs per 100 WBC 0      Absolute Neutrophils 3.8      Absolute Lymphocytes 2.0      Absolute Monocytes 0.6      Absolute Eosinophils 0.3      Absolute Basophils 0.1      Absolute Immature Granulocytes 0.0      Absolute NRBCs 0.0     INR   COMPREHENSIVE METABOLIC PANEL   LIPASE   TYPE AND SCREEN, ADULT   ABO/RH TYPE AND SCREEN         Results for orders placed or performed during the hospital encounter of 10/15/22   CBC with platelets and differential   Result Value Ref Range    WBC Count 6.7 4.0 - 11.0 10e3/uL    RBC Count 4.03 3.80 - 5.20 10e6/uL    Hemoglobin 11.7 11.7 - 15.7 g/dL    Hematocrit 35.5 35.0 - 47.0 %    MCV 88 78 - 100 fL    MCH 29.0 26.5 - 33.0 pg    MCHC 33.0 31.5 - 36.5 g/dL    RDW 13.3 10.0 - 15.0 %    Platelet Count 283 150 - 450 10e3/uL    % Neutrophils 56 %    % Lymphocytes 30 %    % Monocytes 9 %    % Eosinophils 4 %    % Basophils 1 %    % Immature Granulocytes 0 %    NRBCs per 100 WBC 0 <1 /100    Absolute Neutrophils 3.8 1.6 - 8.3 10e3/uL    Absolute Lymphocytes 2.0 0.8 - 5.3 10e3/uL    Absolute Monocytes 0.6 0.0 - 1.3 10e3/uL    Absolute Eosinophils 0.3 0.0 - 0.7 10e3/uL    Absolute Basophils 0.1 0.0 - 0.2 10e3/uL    Absolute Immature Granulocytes 0.0 <=0.4 10e3/uL    Absolute NRBCs  0.0 10e3/uL       Estrada Beltrán MD  Emergency Medicine  [facility] EMERGENCY ROOM  [address] [facility] [address]  [phone]  10/15/2022         Estrada Beltrán MD  10/15/22 [id number]

## 2022-10-18 RX ORDER — IBUPROFEN 600 MG/1
600 TABLET, FILM COATED ORAL EVERY 8 HOURS PRN
Qty: 24 TABLET | Refills: 0 | Status: ON HOLD | OUTPATIENT
Start: 2022-10-18 | End: 2023-10-16

## 2022-10-18 RX ORDER — ONDANSETRON 4 MG/1
4 TABLET, ORALLY DISINTEGRATING ORAL EVERY 8 HOURS PRN
Qty: 10 TABLET | Refills: 0 | Status: SHIPPED | OUTPATIENT
Start: 2022-10-18 | End: 2022-12-08

## 2022-10-19 LAB
Lab: NORMAL
PERFORMING LABORATORY: NORMAL
SPECIMEN STATUS: NORMAL
TEST NAME: NORMAL

## 2022-10-23 ENCOUNTER — ANESTHESIA (OUTPATIENT)
Dept: SURGERY | Facility: CLINIC | Age: 32
End: 2022-10-23
Payer: COMMERCIAL

## 2022-10-23 ENCOUNTER — APPOINTMENT (OUTPATIENT)
Dept: CT IMAGING | Facility: CLINIC | Age: 32
End: 2022-10-23
Attending: EMERGENCY MEDICINE
Payer: COMMERCIAL

## 2022-10-23 ENCOUNTER — HOSPITAL ENCOUNTER (OUTPATIENT)
Facility: CLINIC | Age: 32
End: 2022-10-23
Attending: INTERNAL MEDICINE | Admitting: INTERNAL MEDICINE
Payer: COMMERCIAL

## 2022-10-23 ENCOUNTER — ANESTHESIA EVENT (OUTPATIENT)
Dept: SURGERY | Facility: CLINIC | Age: 32
End: 2022-10-23
Payer: COMMERCIAL

## 2022-10-23 ENCOUNTER — HOSPITAL ENCOUNTER (EMERGENCY)
Facility: CLINIC | Age: 32
Discharge: NURSING, REHAB OR LONG-TERM CARE | End: 2022-10-23
Attending: EMERGENCY MEDICINE | Admitting: INTERNAL MEDICINE
Payer: COMMERCIAL

## 2022-10-23 VITALS
OXYGEN SATURATION: 93 % | DIASTOLIC BLOOD PRESSURE: 78 MMHG | TEMPERATURE: 97.9 F | HEART RATE: 110 BPM | SYSTOLIC BLOOD PRESSURE: 156 MMHG | RESPIRATION RATE: 17 BRPM

## 2022-10-23 DIAGNOSIS — T18.9XXA SWALLOWED FOREIGN BODY, INITIAL ENCOUNTER: Primary | ICD-10-CM

## 2022-10-23 DIAGNOSIS — T18.9XXA FOREIGN BODY IN DIGESTIVE TRACT, INITIAL ENCOUNTER: ICD-10-CM

## 2022-10-23 LAB
ANION GAP SERPL CALCULATED.3IONS-SCNC: 11 MMOL/L (ref 5–18)
BUN SERPL-MCNC: 12 MG/DL (ref 8–22)
CALCIUM SERPL-MCNC: 9.6 MG/DL (ref 8.5–10.5)
CHLORIDE BLD-SCNC: 101 MMOL/L (ref 98–107)
CO2 SERPL-SCNC: 26 MMOL/L (ref 22–31)
CREAT SERPL-MCNC: 0.66 MG/DL (ref 0.6–1.1)
ERYTHROCYTE [DISTWIDTH] IN BLOOD BY AUTOMATED COUNT: 13.2 % (ref 10–15)
GFR SERPL CREATININE-BSD FRML MDRD: >90 ML/MIN/1.73M2
GLUCOSE BLD-MCNC: 125 MG/DL (ref 70–125)
HCG SERPL QL: NEGATIVE
HCT VFR BLD AUTO: 37.2 % (ref 35–47)
HGB BLD-MCNC: 12.3 G/DL (ref 11.7–15.7)
MCH RBC QN AUTO: 29.1 PG (ref 26.5–33)
MCHC RBC AUTO-ENTMCNC: 33.1 G/DL (ref 31.5–36.5)
MCV RBC AUTO: 88 FL (ref 78–100)
PLATELET # BLD AUTO: 290 10E3/UL (ref 150–450)
POTASSIUM BLD-SCNC: 3.7 MMOL/L (ref 3.5–5)
RBC # BLD AUTO: 4.23 10E6/UL (ref 3.8–5.2)
SARS-COV-2 RNA RESP QL NAA+PROBE: NEGATIVE
SODIUM SERPL-SCNC: 138 MMOL/L (ref 136–145)
UPPER GI ENDOSCOPY: NORMAL
WBC # BLD AUTO: 8.2 10E3/UL (ref 4–11)

## 2022-10-23 PROCEDURE — 258N000003 HC RX IP 258 OP 636: Performed by: NURSE ANESTHETIST, CERTIFIED REGISTERED

## 2022-10-23 PROCEDURE — 85027 COMPLETE CBC AUTOMATED: CPT | Performed by: EMERGENCY MEDICINE

## 2022-10-23 PROCEDURE — 710N000010 HC RECOVERY PHASE 1, LEVEL 2, PER MIN: Performed by: INTERNAL MEDICINE

## 2022-10-23 PROCEDURE — 250N000025 HC SEVOFLURANE, PER MIN: Performed by: INTERNAL MEDICINE

## 2022-10-23 PROCEDURE — 84703 CHORIONIC GONADOTROPIN ASSAY: CPT | Performed by: EMERGENCY MEDICINE

## 2022-10-23 PROCEDURE — 96375 TX/PRO/DX INJ NEW DRUG ADDON: CPT | Mod: 59

## 2022-10-23 PROCEDURE — 80048 BASIC METABOLIC PNL TOTAL CA: CPT | Performed by: EMERGENCY MEDICINE

## 2022-10-23 PROCEDURE — C9803 HOPD COVID-19 SPEC COLLECT: HCPCS

## 2022-10-23 PROCEDURE — 250N000011 HC RX IP 250 OP 636: Performed by: NURSE ANESTHETIST, CERTIFIED REGISTERED

## 2022-10-23 PROCEDURE — 250N000011 HC RX IP 250 OP 636: Performed by: EMERGENCY MEDICINE

## 2022-10-23 PROCEDURE — 99285 EMERGENCY DEPT VISIT HI MDM: CPT | Mod: 25

## 2022-10-23 PROCEDURE — 74177 CT ABD & PELVIS W/CONTRAST: CPT

## 2022-10-23 PROCEDURE — 36415 COLL VENOUS BLD VENIPUNCTURE: CPT | Performed by: EMERGENCY MEDICINE

## 2022-10-23 PROCEDURE — 360N000075 HC SURGERY LEVEL 2, PER MIN: Performed by: INTERNAL MEDICINE

## 2022-10-23 PROCEDURE — 272N000001 HC OR GENERAL SUPPLY STERILE: Performed by: INTERNAL MEDICINE

## 2022-10-23 PROCEDURE — U0005 INFEC AGEN DETEC AMPLI PROBE: HCPCS | Performed by: EMERGENCY MEDICINE

## 2022-10-23 PROCEDURE — 96374 THER/PROPH/DIAG INJ IV PUSH: CPT

## 2022-10-23 PROCEDURE — 43247 EGD REMOVE FOREIGN BODY: CPT

## 2022-10-23 PROCEDURE — 250N000009 HC RX 250: Performed by: NURSE ANESTHETIST, CERTIFIED REGISTERED

## 2022-10-23 PROCEDURE — 370N000017 HC ANESTHESIA TECHNICAL FEE, PER MIN: Performed by: INTERNAL MEDICINE

## 2022-10-23 PROCEDURE — 710N000012 HC RECOVERY PHASE 2, PER MINUTE: Performed by: INTERNAL MEDICINE

## 2022-10-23 RX ORDER — ONDANSETRON 2 MG/ML
4 INJECTION INTRAMUSCULAR; INTRAVENOUS
Status: DISCONTINUED | OUTPATIENT
Start: 2022-10-23 | End: 2022-10-23 | Stop reason: HOSPADM

## 2022-10-23 RX ORDER — KETOROLAC TROMETHAMINE 15 MG/ML
15 INJECTION, SOLUTION INTRAMUSCULAR; INTRAVENOUS ONCE
Status: COMPLETED | OUTPATIENT
Start: 2022-10-23 | End: 2022-10-23

## 2022-10-23 RX ORDER — ONDANSETRON 4 MG/1
4 TABLET, ORALLY DISINTEGRATING ORAL EVERY 6 HOURS PRN
Status: DISCONTINUED | OUTPATIENT
Start: 2022-10-23 | End: 2022-10-24 | Stop reason: HOSPADM

## 2022-10-23 RX ORDER — ONDANSETRON 2 MG/ML
4 INJECTION INTRAMUSCULAR; INTRAVENOUS EVERY 30 MIN PRN
Status: DISCONTINUED | OUTPATIENT
Start: 2022-10-23 | End: 2022-10-23 | Stop reason: HOSPADM

## 2022-10-23 RX ORDER — FENTANYL CITRATE 50 UG/ML
INJECTION, SOLUTION INTRAMUSCULAR; INTRAVENOUS PRN
Status: DISCONTINUED | OUTPATIENT
Start: 2022-10-23 | End: 2022-10-23

## 2022-10-23 RX ORDER — ONDANSETRON 2 MG/ML
4 INJECTION INTRAMUSCULAR; INTRAVENOUS EVERY 6 HOURS PRN
Status: DISCONTINUED | OUTPATIENT
Start: 2022-10-23 | End: 2022-10-24 | Stop reason: HOSPADM

## 2022-10-23 RX ORDER — NALOXONE HYDROCHLORIDE 0.4 MG/ML
0.2 INJECTION, SOLUTION INTRAMUSCULAR; INTRAVENOUS; SUBCUTANEOUS
Status: DISCONTINUED | OUTPATIENT
Start: 2022-10-23 | End: 2022-10-24 | Stop reason: HOSPADM

## 2022-10-23 RX ORDER — SODIUM CHLORIDE, SODIUM LACTATE, POTASSIUM CHLORIDE, CALCIUM CHLORIDE 600; 310; 30; 20 MG/100ML; MG/100ML; MG/100ML; MG/100ML
INJECTION, SOLUTION INTRAVENOUS CONTINUOUS
Status: DISCONTINUED | OUTPATIENT
Start: 2022-10-23 | End: 2022-10-23 | Stop reason: HOSPADM

## 2022-10-23 RX ORDER — FENTANYL CITRATE 50 UG/ML
25 INJECTION, SOLUTION INTRAMUSCULAR; INTRAVENOUS EVERY 5 MIN PRN
Status: DISCONTINUED | OUTPATIENT
Start: 2022-10-23 | End: 2022-10-23 | Stop reason: HOSPADM

## 2022-10-23 RX ORDER — NALOXONE HYDROCHLORIDE 0.4 MG/ML
0.4 INJECTION, SOLUTION INTRAMUSCULAR; INTRAVENOUS; SUBCUTANEOUS
Status: DISCONTINUED | OUTPATIENT
Start: 2022-10-23 | End: 2022-10-24 | Stop reason: HOSPADM

## 2022-10-23 RX ORDER — HYDROMORPHONE HYDROCHLORIDE 1 MG/ML
0.5 INJECTION, SOLUTION INTRAMUSCULAR; INTRAVENOUS; SUBCUTANEOUS ONCE
Status: COMPLETED | OUTPATIENT
Start: 2022-10-23 | End: 2022-10-23

## 2022-10-23 RX ORDER — PROPOFOL 10 MG/ML
INJECTION, EMULSION INTRAVENOUS CONTINUOUS PRN
Status: DISCONTINUED | OUTPATIENT
Start: 2022-10-23 | End: 2022-10-23

## 2022-10-23 RX ORDER — PROCHLORPERAZINE MALEATE 10 MG
10 TABLET ORAL EVERY 6 HOURS PRN
Status: DISCONTINUED | OUTPATIENT
Start: 2022-10-23 | End: 2022-10-24 | Stop reason: HOSPADM

## 2022-10-23 RX ORDER — ONDANSETRON 2 MG/ML
INJECTION INTRAMUSCULAR; INTRAVENOUS PRN
Status: DISCONTINUED | OUTPATIENT
Start: 2022-10-23 | End: 2022-10-23

## 2022-10-23 RX ORDER — FLUMAZENIL 0.1 MG/ML
0.2 INJECTION, SOLUTION INTRAVENOUS
Status: DISCONTINUED | OUTPATIENT
Start: 2022-10-23 | End: 2022-10-24 | Stop reason: HOSPADM

## 2022-10-23 RX ORDER — IOPAMIDOL 755 MG/ML
100 INJECTION, SOLUTION INTRAVASCULAR ONCE
Status: COMPLETED | OUTPATIENT
Start: 2022-10-23 | End: 2022-10-23

## 2022-10-23 RX ORDER — PROPOFOL 10 MG/ML
INJECTION, EMULSION INTRAVENOUS PRN
Status: DISCONTINUED | OUTPATIENT
Start: 2022-10-23 | End: 2022-10-23

## 2022-10-23 RX ORDER — LIDOCAINE 40 MG/G
CREAM TOPICAL
Status: DISCONTINUED | OUTPATIENT
Start: 2022-10-23 | End: 2022-10-23 | Stop reason: HOSPADM

## 2022-10-23 RX ORDER — SODIUM CHLORIDE, SODIUM LACTATE, POTASSIUM CHLORIDE, CALCIUM CHLORIDE 600; 310; 30; 20 MG/100ML; MG/100ML; MG/100ML; MG/100ML
INJECTION, SOLUTION INTRAVENOUS CONTINUOUS PRN
Status: DISCONTINUED | OUTPATIENT
Start: 2022-10-23 | End: 2022-10-23

## 2022-10-23 RX ORDER — LIDOCAINE HYDROCHLORIDE 10 MG/ML
INJECTION, SOLUTION INFILTRATION; PERINEURAL PRN
Status: DISCONTINUED | OUTPATIENT
Start: 2022-10-23 | End: 2022-10-23

## 2022-10-23 RX ORDER — ONDANSETRON 4 MG/1
4 TABLET, ORALLY DISINTEGRATING ORAL EVERY 30 MIN PRN
Status: DISCONTINUED | OUTPATIENT
Start: 2022-10-23 | End: 2022-10-23 | Stop reason: HOSPADM

## 2022-10-23 RX ADMIN — PROPOFOL 200 MG: 10 INJECTION, EMULSION INTRAVENOUS at 20:37

## 2022-10-23 RX ADMIN — LIDOCAINE HYDROCHLORIDE 4 ML: 10 INJECTION, SOLUTION INFILTRATION; PERINEURAL at 20:37

## 2022-10-23 RX ADMIN — KETOROLAC TROMETHAMINE 15 MG: 15 INJECTION, SOLUTION INTRAMUSCULAR; INTRAVENOUS at 18:01

## 2022-10-23 RX ADMIN — PROPOFOL 200 MCG/KG/MIN: 10 INJECTION, EMULSION INTRAVENOUS at 20:37

## 2022-10-23 RX ADMIN — HYDROMORPHONE HYDROCHLORIDE 0.5 MG: 1 INJECTION, SOLUTION INTRAMUSCULAR; INTRAVENOUS; SUBCUTANEOUS at 19:38

## 2022-10-23 RX ADMIN — IOPAMIDOL 100 ML: 755 INJECTION, SOLUTION INTRAVENOUS at 18:37

## 2022-10-23 RX ADMIN — Medication 100 MG: at 20:37

## 2022-10-23 RX ADMIN — FENTANYL CITRATE 50 MCG: 50 INJECTION, SOLUTION INTRAMUSCULAR; INTRAVENOUS at 21:00

## 2022-10-23 RX ADMIN — SODIUM CHLORIDE, POTASSIUM CHLORIDE, SODIUM LACTATE AND CALCIUM CHLORIDE: 600; 310; 30; 20 INJECTION, SOLUTION INTRAVENOUS at 20:31

## 2022-10-23 RX ADMIN — ONDANSETRON 4 MG: 2 INJECTION INTRAMUSCULAR; INTRAVENOUS at 20:44

## 2022-10-23 RX ADMIN — MIDAZOLAM 2 MG: 1 INJECTION INTRAMUSCULAR; INTRAVENOUS at 20:31

## 2022-10-23 RX ADMIN — FENTANYL CITRATE 50 MCG: 50 INJECTION, SOLUTION INTRAMUSCULAR; INTRAVENOUS at 20:37

## 2022-10-23 ASSESSMENT — ENCOUNTER SYMPTOMS
VOMITING: 0
ABDOMINAL PAIN: 1
NAUSEA: 0

## 2022-10-23 ASSESSMENT — ACTIVITIES OF DAILY LIVING (ADL)
ADLS_ACUITY_SCORE: 40

## 2022-10-23 NOTE — ED PROVIDER NOTES
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 an anti-Cw and non-specific antibodies. Blood product orders may be delayed. Draw one red top and two purple top tubes for all type/screen/crossmatch orders.  Patient has anti-Cw, anti-K (Hanna), Warm auto and nonspecific antibodies. Blood products may be delayed. Draw patient 24 hours prior to transfusion. Draw one red top and two purple top tubes  for all type and screen orders.     Clavulanic Acid Angioedema     Fentanyl Itching     Naltrexone Other (See Comments)     Reaction(s): Vivid dreams.     Other Drug Allergy (See Comments)      See original file MRN [id number]. Files are marked for merge     Oxycodone Swelling     Adhesive Tape Rash     Silicone type     Band-Aid Anti-Itch      Other reaction(s): adhesive allergy     Cephalosporins Rash     Lamotrigine Rash     Possibly associated with Lamictal.   HUT Comment: Possibly associated with Lamictal. ; HUT Reaction: Rash; HUT Reaction Type: Allergy; HUT Severity: Low; HUT Noted: [id number]       FAMILY HISTORY:  Family History   Problem Relation Age of Onset     Diabetes Type 2  Maternal Grandmother      Diabetes Type 2  Paternal Grandmother      Breast Cancer Paternal Grandmother      Cerebrovascular Disease Father 53     Myocardial Infarction No family hx of      Coronary Artery Disease Early Onset No family hx of      Ovarian Cancer No family hx of      Colon Cancer No family hx of      Depression Mother      Anxiety Disorder Mother        SOCIAL HISTORY:   Social History     Socioeconomic History     Marital status: Single   Occupational History     Occupation: On disability   Tobacco Use     Smoking status: Never     Smokeless tobacco: Never   Substance and Sexual Activity     Alcohol use: No     Alcohol/week: 0.0 standard drinks     Drug use: No     Sexual activity: Not Currently     Partners: Male     Birth control/protection: I.U.D.     Comment: IUD - Mirena - placed July, 2015   Social History Narrative    Single.    Living in independent living portion of [employer].    On disability.    No regular exercise.        VITALS:  BP (!) 156/78   Pulse 110   Temp 97.9  F (36.6  C) (Temporal)   Resp 17   LMP 10/07/2022 (Approximate)   SpO2 93%     PHYSICAL EXAM    Constitutional: Well developed, well nourished. Comfortable appearing.  HENT: Normocephalic, atraumatic, mucous membranes moist,  nose normal. Neck- Supple, gross ROM intact.   Eyes: Pupils mid-range, conjunctiva without injection, no discharge.   Respiratory: Clear to auscultation bilaterally, no respiratory distress, no wheezing, speaks full sentences easily. No cough.  Cardiovascular: Normal heart rate, regular rhythm, no murmurs.   GI: Soft, no masses. LUQ tenderness and guarding.  Musculoskeletal: Moving all 4 extremities intentionally and without pain. No obvious deformity.  Skin: Warm, dry, no rash.  Neurologic: Alert & oriented x 3, cranial nerves grossly intact.  Psychiatric: Affect normal, cooperative.     LAB:  All pertinent labs reviewed and interpreted.  Labs Ordered and Resulted from Time of ED Arrival to Time of ED Departure   CBC WITH PLATELETS - Normal       Result Value    WBC Count 8.2      RBC Count 4.23      Hemoglobin 12.3      Hematocrit 37.2      MCV 88      MCH 29.1      MCHC 33.1      RDW 13.2      Platelet Count 290     BASIC METABOLIC PANEL - Normal    Sodium 138      Potassium 3.7      Chloride 101      Carbon Dioxide (CO2) 26      Anion Gap 11      Urea Nitrogen 12      Creatinine 0.66      Calcium 9.6      Glucose 125      GFR Estimate >90     HCG QUALITATIVE PREGNANCY - Normal    hCG Serum Qualitative Negative         RADIOLOGY:  Reviewed all pertinent imaging. Please see official radiology report.  CT Abdomen Pelvis w Contrast   Final Result   IMPRESSION:    1.  Linear metallic foreign body consistent with a 10 cm length of wire within distal stomach extending just into the duodenal bulb.   2.  Fatty infiltration of liver.          EKG:    All EKG interpretations will be found in ED course above.      I, Esteban Hull am serving as a scribe to document services personally performed by Dr. Jonh Singh based on my observation and the provider's statements to me. I, Jonh Singh MD attest that Esteban Hull is acting in a scribe capacity, has observed my performance of the services and has documented them in  accordance with my direction.    Jonh Singh M.D.  Emergency Medicine  [facility] EMERGENCY ROOM  [address] [id number] [phone]  Dept: [phone]     Jonh Singh MD  10/24/22 2989

## 2022-10-23 NOTE — ED TRIAGE NOTES
Patient arrived by EMS from [facility], for eating the wires out of a surgical mask at 1500 today.  Patient has history of PIKA.  C/o left upper abdominal pain.  .     Triage Assessment     Row Name 10/23/22 5718       Triage Assessment (Adult)    Airway WDL WDL       Respiratory WDL    Respiratory WDL WDL       Skin Circulation/Temperature WDL    Skin Circulation/Temperature WDL WDL       Cardiac WDL    Cardiac WDL WDL       Peripheral/Neurovascular WDL    Peripheral Neurovascular WDL WDL       Cognitive/Neuro/Behavioral WDL    Cognitive/Neuro/Behavioral WDL WDL       Eliazar Coma Scale    Best Eye Response 4-->(E4) spontaneous    Best Motor Response 6-->(M6) obeys commands    Best Verbal Response 5-->(V5) oriented    Eliazar Coma Scale Score 15

## 2022-10-24 ENCOUNTER — THERAPY VISIT (OUTPATIENT)
Dept: SPEECH THERAPY | Facility: CLINIC | Age: 32
End: 2022-10-24
Payer: COMMERCIAL

## 2022-10-24 ENCOUNTER — PRE VISIT (OUTPATIENT)
Dept: OTOLARYNGOLOGY | Facility: CLINIC | Age: 32
End: 2022-10-24

## 2022-10-24 ENCOUNTER — OFFICE VISIT (OUTPATIENT)
Dept: OTOLARYNGOLOGY | Facility: CLINIC | Age: 32
End: 2022-10-24
Payer: COMMERCIAL

## 2022-10-24 VITALS
WEIGHT: 293 LBS | HEART RATE: 112 BPM | BODY MASS INDEX: 53.92 KG/M2 | OXYGEN SATURATION: 96 % | HEIGHT: 62 IN | RESPIRATION RATE: 16 BRPM

## 2022-10-24 DIAGNOSIS — R13.14 PHARYNGOESOPHAGEAL DYSPHAGIA: Primary | ICD-10-CM

## 2022-10-24 DIAGNOSIS — J38.01 PARALYSIS OF LEFT VOCAL CORD: Primary | ICD-10-CM

## 2022-10-24 DIAGNOSIS — R49.0 DYSPHONIA: ICD-10-CM

## 2022-10-24 DIAGNOSIS — R13.12 OROPHARYNGEAL DYSPHAGIA: ICD-10-CM

## 2022-10-24 DIAGNOSIS — G47.33 OSA (OBSTRUCTIVE SLEEP APNEA): ICD-10-CM

## 2022-10-24 PROCEDURE — 99204 OFFICE O/P NEW MOD 45 MIN: CPT | Mod: 25 | Performed by: OTOLARYNGOLOGY

## 2022-10-24 PROCEDURE — 99207 PR NO BILLABLE SERVICE THIS VISIT: CPT | Performed by: SPEECH-LANGUAGE PATHOLOGIST

## 2022-10-24 PROCEDURE — 31575 DIAGNOSTIC LARYNGOSCOPY: CPT | Performed by: OTOLARYNGOLOGY

## 2022-10-24 ASSESSMENT — PAIN SCALES - GENERAL: PAINLEVEL: NO PAIN (0)

## 2022-10-24 NOTE — PRE-PROCEDURE INSTRUCTIONS
[facility] Pre-Procedure Note    Reason for procedure: Foreign body ingestion 3:30    History and Physical Reviewed: H/O multiple FB ingestions in past and esophageal perforation.  Swallowed metal piece from mask.  Last episode 1 mo ago.  No anticoagulation.  Tried to call her neri several times for consent-NA.  We will proceed with emergent EGD in OR with anesthesia.   Patient understand plan and wishes to proceed.    Pre-sedation assessment:    General: alert, appears stated age, and cooperative  Airway: normal  Heart: regular rate and rhythm  Lungs: clear to auscultation bilaterally  Abdomen:  NT/ND; normal bowel sounds    Sedation Plan based on assessment: Moderate    Mallampati score: Class II (visualization of the soft palate, fauces, and uvula)          ASA Classification: ASA 3 - Patient with moderate systemic disease with functional limitations    Impression: Patient deemed adequate candidate for conscious sedation.    Risks, benefits and alternatives were discussed with the patient and informed consent was obtained.    Plan: esophagogastroduodenoscopy with FB removal              Barney Pinto MD  Thank you for the opportunity to participate in the care of this patient.   Please feel free to call me with any questions or concerns.  Phone number [phone].

## 2022-10-24 NOTE — PROCEDURES
[facility] Note    See attached note for details of EGD.    Patient's guardian called back in the procedure and it was explained to her and agrees with the emergent plans.    Needs close observation X 24 hours.    Clear liquids X 12 hours then resume previous diet    Needs ongoing psychiatric cares.    Barney Pinto MD  [facility]

## 2022-10-24 NOTE — ANESTHESIA POSTPROCEDURE EVALUATION
Patient: Nevin Alvarado    Procedure: Procedure(s):  ESOPHAGOGASTRODUODENOSCOPY (EGD) FOR REMOVAL OF FOREIGN BODY       Anesthesia Type:  General    Note:  Disposition: Outpatient   Postop Pain Control: Uneventful            Sign Out: Well controlled pain   PONV: No   Neuro/Psych: Uneventful            Sign Out: Acceptable/Baseline neuro status   Airway/Respiratory: Uneventful            Sign Out: Acceptable/Baseline resp. status   CV/Hemodynamics: Uneventful            Sign Out: Acceptable CV status; No obvious hypovolemia; No obvious fluid overload   Other NRE: NONE   DID A NON-ROUTINE EVENT OCCUR? No    Event details/Postop Comments:  Alert and awake. No apparent discomfort. She is instructed to use motrin/ibuprofen +/- Tylenol as directed on label for pain when she gets home. She states that she is able to take NSAIDS and tylenol without problems.           Last vitals:  Vitals Value Taken Time   /79 10/23/22 2110   Temp 36.6  C (97.9  F) 10/23/22 2058   Pulse 106 10/23/22 2113   Resp 14 10/23/22 2113   SpO2 90 % 10/23/22 2113   Vitals shown include unvalidated device data.    Electronically Signed By: John Mccauley MD  October 23, 2022  9:15 PM

## 2022-10-24 NOTE — LETTER
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 Oxycodone, Adhesive tape, Band-aid anti-itch, Cephalosporins, and Lamotrigine    SOCIAL HISTORY:    Social History     Socioeconomic History     Marital status: Single     Spouse name: Not on file     Number of children: Not on file     Years of education: Not on  file     Highest education level: Not on file   Occupational History     Occupation: On disability   Tobacco Use     Smoking status: Never     Smokeless tobacco: Never   Vaping Use     Vaping Use: Not on file   Substance and Sexual Activity     Alcohol use: No     Alcohol/week: 0.0 standard drinks     Drug use: No     Sexual activity: Not Currently     Partners: Male     Birth control/protection: I.U.D.     Comment: IUD - Mirena - placed July, 2015   Other Topics Concern     Parent/sibling w/ CABG, MI or angioplasty before 65F 55M? Not Asked   Social History Narrative    Single.    Living in independent living portion of [employer].    On disability.    No regular exercise.      Social Determinants of Health     Financial Resource Strain: Not on file   Food Insecurity: Not on file   Transportation Needs: Not on file   Physical Activity: Not on file   Stress: Not on file   Social Connections: Not on file   Intimate Partner Violence: Not on file   Housing Stability: Not on file         FAMILY HISTORY:   Family History   Problem Relation Age of Onset     Diabetes Type 2  Maternal Grandmother      Diabetes Type 2  Paternal Grandmother      Breast Cancer Paternal Grandmother      Cerebrovascular Disease Father 53     Myocardial Infarction No family hx of      Coronary Artery Disease Early Onset No family hx of      Ovarian Cancer No family hx of      Colon Cancer No family hx of      Depression Mother      Anxiety Disorder Mother      Non-contributory for problems with anesthesia    REVIEW OF SYSTEMS:   The patient was asked a 14 point review of systems regarding constitutional symptoms, eye symptoms, ears, nose, mouth, throat symptoms, cardiovascular symptoms, respiratory symptoms, gastrointestinal symptoms, genitourinary symptoms, musculoskeletal symptoms, integumentary symptoms, neurological symptoms, psychiatric symptoms, endocrine symptoms, hematologic/lymphatic symptoms, and allergic/ immunologic symptoms.    The pertinent factors have been included in the HPI and below.  Patient Supplied Answers to Review of Systems   ENT ROS 10/24/2022   Constitutional: Weight gain, Problems with sleep   Neurology: Dizzy spells, Numbness, Unexplained weakness, Headache   Psychology: Frequently feeling anxious   Ears, Nose, Throat: Ringing/noise in ears, Nasal congestion or drainage, Sore throat, Coughing up blood   Gastrointestinal/Genitourinary: Unexplained nausea/vomiting, Diarrhea   Musculoskeletal: Sore or stiff joints, Swollen joints, Back pain, Neck pain, Swollen legs/feet   Allergy/Immunology: Skin changes   Hematologic: Easy bruising, Lymph node swelling   Endocrine: Thirst, Heat or cold intolerance, Frequent urination       Physical Examination   The patient underwent a physical examination as described below. The pertinent positive and negative findings are summarized after the description of the examination.  Constitutional: The patient's developmental and nutritional status was assessed. The patient's voice quality was assessed.  Head and Face: The head and face were inspected for deformities. The sinuses were palpated. The salivary glands were palpated. Facial muscle strength was assessed bilaterally.  Eyes: Extraocular movements and primary gaze alignment were assessed.  Ears, Nose, Mouth and Throat: The ears and nose were examined for deformities. The nasal septum, mucosa, and turbinates were inspected by anterior rhinoscopy. The lips, teeth, and gums were examined for abnormalities. The oral mucosa, tongue, palate, tonsils, lateral and posterior pharynx were inspected for the presence of asymmetry or mucosal lesions.    Neck: The tracheal position was noted, and the neck mass palpated to determine if there were any asymmetries, abnormal neck masses, thyromegally, or thyroid nodules.  Respiratory: The nature of the breathing and chest expansion/symmetry was observed.  Cardiovascular: The patient was examined to  determine the presence of any edema or jugular venous distension.  Abdomen: The contour of the abdomen was noted.  Lymphatic: The patient was examined for infraclavicular lymphadenopathy.  Musculoskeletal: The patient was inspected for the presence of skeletal deformities.  Extremities: The extremities were examined for any clubbing or cyanosis.  Skin: The skin was examined for inflammatory or neoplastic conditions.  Neurologic: The patient's orientation, mood, and affect were noted. The cranial nerve  functions were examined.  Other pertinent positive and negative findings on physical examination:      OC/OP: no lesions, uvula midline, soft palate elevates symmetrically   Neck: no lesions, no TH tenderness to palpation    All other physical examination findings were within normal limits and noncontributory.  Procedures   Flexible laryngoscopy (CPT 98269)      Pre-procedure diagnosis: dysphonia  Post-procedure diagnosis: same as above  Indication for procedure: Ms. Alvarado is a 31 year old female with see above  Procedure(s): Fiberoptic Laryngoscopy    Details of Procedure: After informed consent was obtained, the patient was seated in the examination chair.  The areas of the nasopharynx as well as the hypopharynx were anesthetized with topical 4% lidocaine with 0.25% phenylephrine atomizer.  Examination of the base of tongue was performed first.  Attention was directed to any evidence of masses in the area or evidence of leukoplakia or candidal infection.  Attention was directed to the epiglottis where its size and position was determined and its movement on phonation of the vowel  e .  The piriform sinuses were then inspected for any mass lesions or pooling of secretions.  Attention was then directed to the larynx. The vocal folds were inspected for infection or any areas of leukoplakia, for masses, polypoid degeneration, or hemorrhage.  Having done this, the arytenoids and vocal processes were inspected for  erythema or evidence of granuloma formation.  The posterior commissure was then inspected for evidence of inflammatory changes in the mucosa and heaping up of mucosal tissue. The patient was then instructed to say the vowel  e .  Adduction of vocal folds to the midline was observed for any evidence of paresis or paralysis of the larynx or asymmetry in rotation of the larynx to the left or right. The patient was asked to breathe and the degree of abduction was noted bilaterally.  Subglottic view of the larynx was obtained for any additional mass lesions or mucosal changes.  Finally the post cricoid was examined for evidence of pooling of secretions, as well as the pharyngeal wall mucosa.   Anesthesia type: 0.25% phenylephrine    Findings:  Anatomic/physiological deviations: RNC, left vocal fold paralysis, postcricoid edema, right>left infraglottic prominence/granuloma    Right vocal process: No restriction of mobility   Left vocal process: Marked restriction of mobility  Glottal gap: Complete glottal closure  Supraglottic structures: Normal  Hypopharynx: Normal     Estimated Blood Loss: minimal  Complications: None  Disposition: Patient tolerated the procedure well                Review of Relevant Clinical Data   I personally reviewed:  Notes: Anjali Acishan 10/11/22  32yo female with complex PMH including frequent foreign body ingestion with numerous surgeries to remove objects and Hx esophageal perforation in 2019, GERD, complex psychiatric history including boderline personality disorder, anxiety/depression, and PTSD, ZOHRA on CPAP, Hx PE in November 2019, and asthma presenting with dysphonia.  Pt first became hoarse in May 2022 following an illness, reporting that she lost her voice completely for 3 weeks.  Pt was diagnosed with left vocal fold paresis by ENT in the ED on 8/22/22.  Since then, pt reports that her voice is hoarse all the time and sounds worse the more she talks.  She also feels that she has to  breathe more frequently when she talks.  She has been unable to sing, noting reduced range in both her higher and lower pitches.  She experiences occasional pain in her throat.  She will get a tickle sensation in her throat at night, and will cough to the point of near emesis.  She notes that food will occasionally get stuck in her throat and she will have to vomit to bring it up.  Please see chart for additional PMH.    Ed 8/23/22  . Swallowed ~15 cm wire from a mask at 1900 on 8/23 in setting of increased anxiety, not in suicide attempt per pt    ED 10/23/22  Nevin Alvarado is a 31 year old female with a pertinent history of morbid obesity, anxiety and depression, self arm with recurrent swallowed foreign bodies, who presents to this ED by EMS from [facility] for evaluation of a swallowed foreign body. Patient with a history of swallowing foreign bodies. Reports that today around 3:00 PM she swallowed the metal wiring from a surgical mask. She did not fold it up, pushed it down straight. Symptoms started 30-40 minutes later with a mild poking sensation that has progressed into a sharp, stabbing pain in her LUQ. Denies any associated nausea or vomiting. She reports a history of abdominal surgeries including cholecystectomy and appendectomy. She denies any chance of pregnancy. Denies any other complaints or concerns.  Radiology: CT neck 8/16/22    Pathology:    Procedures:    Labs:  Lab Results   Component Value Date    TSH 4.94 (H) 02/11/2022     Lab Results   Component Value Date     10/23/2022    CO2 26 10/23/2022    BUN 12 10/23/2022    CREAT 0.6 08/31/2020    PHOS 3.5 12/01/2019     Lab Results   Component Value Date    WBC 8.2 10/23/2022    HGB 12.3 10/23/2022    HCT 37.2 10/23/2022    MCV 88 10/23/2022     10/23/2022     Lab Results   Component Value Date    PTT 26 02/24/2021    INR 1.03 10/15/2022     No results found for: JULIA  No components found for: RHEUMATOIDFACTOR,  RF  Lab Results    Component Value Date    CRP 33.00 (H) 2022    CRP 26.0 (H) 10/31/2020    CRP 27.0 (H) 10/30/2020    CRP 23.0 (H) 10/11/2020    CRP 22.0 (H) 2020     No components found for: CKTOT, URICACID  No components found for: C3, C4, DSDNAAB, NDNAABIFA  No results found for: MPOAB    Patient reported Quality of Life (QOL) Measures   Patient Supplied Answers To VHI Questionnaire  No flowsheet data found.      Patient Supplied Answers To EAT Questionnaire  No flowsheet data found.      Patient Supplied Answers To CSI Questionnaire  No flowsheet data found.      Patient Supplied Answers to Dyspnea Index Questionnaire:  No flowsheet data found.    Impression & Plan     IMPRESSION: Ms. Alvarado is a 31 year old female who is being seen for the followin. Dysphonia   - since May  - after URI  - voice was gone for 3 weeks  - now better but not normal  - both singing and speaking voice  - seen in the hospital on  with vocal fold paralysis on the left  - has complex history of multiple EGDs for foreign body ingestion, had esophageal perf in 2019 which required endoscopic procedures, no open procedures  - has coughing and vomiting as well  - had CT neck 2022 - no obvious lesions  - scope shows left vocal fold paralysis, postcricoid edema, right>left infraglottic prominence/granuloma   - discussed will review CT neck with neurorads - if clear then this is idiopathic vocal fold paralysis vs post-surgical after her perforation in , she can't remember if voice changes happened at that time  - discussed if this is idiopathic after her cold in May - it takes 1 year to allow for full recovery  - voice therapy for optimization both for vocal fold paralysis, as well as for irritable larynx syndrome and for vocal process granuloma/prominence   Plan  - voice therapy  - review CT neck with neurorads    2. Dysphagia  - in the setting of complex history of multiple EGDs for foreign body ingestion, had esophageal perf  in 2019 which required endoscopic procedures, no open procedures  - feels foods like bread and meat get stuck  - no recent swallow evaluations  - just had a foreign body requiring EGD last night  - coughing up blood at times - likely esophageal   - does have nasal crusting today - so discussed vaseline for that   - discussed having GI follow up and swallow evaluation  Plan  - will reach out to Dr Ulloa to set up follow up  - Xray Video Swallow Exam with esophagram with foods from home    3. Sleep apnea  - sleep evaluation     RETURN VISIT: end of January     Thank you for the kind referral and for allowing me to share in the care of Ms. Alvarado. If you have any questions, please do not hesitate to contact me.    Chrissy Simons MD    Laryngology    [facility]  Department of  Otolaryngology - Head and Neck Surgery  [facility] [place] [address]  Appointment line: [phone]  Fax: [phone]  [email or web address]

## 2022-10-24 NOTE — PROGRESS NOTES
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 education level: Not on file   Occupational History     Occupation: On disability   Tobacco Use     Smoking status: Never     Smokeless tobacco: Never   Vaping Use     Vaping Use: Not on file   Substance and Sexual Activity     Alcohol use: No     Alcohol/week: 0.0 standard drinks     Drug use: No     Sexual activity: Not Currently     Partners:  Male     Birth control/protection: I.U.D.     Comment: IUD - Mirena - placed July, 2015   Other Topics Concern     Parent/sibling w/ CABG, MI or angioplasty before 65F 55M? Not Asked   Social History Narrative    Single.    Living in independent living portion of [employer].    On disability.    No regular exercise.      Social Determinants of Health     Financial Resource Strain: Not on file   Food Insecurity: Not on file   Transportation Needs: Not on file   Physical Activity: Not on file   Stress: Not on file   Social Connections: Not on file   Intimate Partner Violence: Not on file   Housing Stability: Not on file         FAMILY HISTORY:   Family History   Problem Relation Age of Onset     Diabetes Type 2  Maternal Grandmother      Diabetes Type 2  Paternal Grandmother      Breast Cancer Paternal Grandmother      Cerebrovascular Disease Father 53     Myocardial Infarction No family hx of      Coronary Artery Disease Early Onset No family hx of      Ovarian Cancer No family hx of      Colon Cancer No family hx of      Depression Mother      Anxiety Disorder Mother      Non-contributory for problems with anesthesia    REVIEW OF SYSTEMS:   The patient was asked a 14 point review of systems regarding constitutional symptoms, eye symptoms, ears, nose, mouth, throat symptoms, cardiovascular symptoms, respiratory symptoms, gastrointestinal symptoms, genitourinary symptoms, musculoskeletal symptoms, integumentary symptoms, neurological symptoms, psychiatric symptoms, endocrine symptoms, hematologic/lymphatic symptoms, and allergic/ immunologic symptoms.   The pertinent factors have been included in the HPI and below.  Patient Supplied Answers to Review of Systems  [facility] ROS 10/24/2022   Constitutional: Weight gain, Problems with sleep   Neurology: Dizzy spells, Numbness, Unexplained weakness, Headache   Psychology: Frequently feeling anxious   Ears, Nose, Throat: Ringing/noise in ears, Nasal congestion or  drainage, Sore throat, Coughing up blood   Gastrointestinal/Genitourinary: Unexplained nausea/vomiting, Diarrhea   Musculoskeletal: Sore or stiff joints, Swollen joints, Back pain, Neck pain, Swollen legs/feet   Allergy/Immunology: Skin changes   Hematologic: Easy bruising, Lymph node swelling   Endocrine: Thirst, Heat or cold intolerance, Frequent urination       Physical Examination   The patient underwent a physical examination as described below. The pertinent positive and negative findings are summarized after the description of the examination.  Constitutional: The patient's developmental and nutritional status was assessed. The patient's voice quality was assessed.  Head and Face: The head and face were inspected for deformities. The sinuses were palpated. The salivary glands were palpated. Facial muscle strength was assessed bilaterally.  Eyes: Extraocular movements and primary gaze alignment were assessed.  Ears, Nose, Mouth and Throat: The ears and nose were examined for deformities. The nasal septum, mucosa, and turbinates were inspected by anterior rhinoscopy. The lips, teeth, and gums were examined for abnormalities. The oral mucosa, tongue, palate, tonsils, lateral and posterior pharynx were inspected for the presence of asymmetry or mucosal lesions.    Neck: The tracheal position was noted, and the neck mass palpated to determine if there were any asymmetries, abnormal neck masses, thyromegally, or thyroid nodules.  Respiratory: The nature of the breathing and chest expansion/symmetry was observed.  Cardiovascular: The patient was examined to determine the presence of any edema or jugular venous distension.  Abdomen: The contour of the abdomen was noted.  Lymphatic: The patient was examined for infraclavicular lymphadenopathy.  Musculoskeletal: The patient was inspected for the presence of skeletal deformities.  Extremities: The extremities were examined for any clubbing or cyanosis.  Skin: The skin  was examined for inflammatory or neoplastic conditions.  Neurologic: The patient's orientation, mood, and affect were noted. The cranial nerve  functions were examined.  Other pertinent positive and negative findings on physical examination:      OC/OP: no lesions, uvula midline, soft palate elevates symmetrically   Neck: no lesions, no TH tenderness to palpation    All other physical examination findings were within normal limits and noncontributory.  Procedures   Flexible laryngoscopy (CPT 82369)      Pre-procedure diagnosis: dysphonia  Post-procedure diagnosis: same as above  Indication for procedure: Ms. Alvarado is a 31 year old female with see above  Procedure(s): Fiberoptic Laryngoscopy    Details of Procedure: After informed consent was obtained, the patient was seated in the examination chair.  The areas of the nasopharynx as well as the hypopharynx were anesthetized with topical 4% lidocaine with 0.25% phenylephrine atomizer.  Examination of the base of tongue was performed first.  Attention was directed to any evidence of masses in the area or evidence of leukoplakia or candidal infection.  Attention was directed to the epiglottis where its size and position was determined and its movement on phonation of the vowel  e .  The piriform sinuses were then inspected for any mass lesions or pooling of secretions.  Attention was then directed to the larynx. The vocal folds were inspected for infection or any areas of leukoplakia, for masses, polypoid degeneration, or hemorrhage.  Having done this, the arytenoids and vocal processes were inspected for erythema or evidence of granuloma formation.  The posterior commissure was then inspected for evidence of inflammatory changes in the mucosa and heaping up of mucosal tissue. The patient was then instructed to say the vowel  e .  Adduction of vocal folds to the midline was observed for any evidence of paresis or paralysis of the larynx or asymmetry in rotation of  the larynx to the left or right. The patient was asked to breathe and the degree of abduction was noted bilaterally.  Subglottic view of the larynx was obtained for any additional mass lesions or mucosal changes.  Finally the post cricoid was examined for evidence of pooling of secretions, as well as the pharyngeal wall mucosa.   Anesthesia type: 0.25% phenylephrine    Findings:  Anatomic/physiological deviations: RNC, left vocal fold paralysis, postcricoid edema, right>left infraglottic prominence/granuloma    Right vocal process: No restriction of mobility   Left vocal process: Marked restriction of mobility  Glottal gap: Complete glottal closure  Supraglottic structures: Normal  Hypopharynx: Normal     Estimated Blood Loss: minimal  Complications: None  Disposition: Patient tolerated the procedure well                Review of Relevant Clinical Data   I personally reviewed:  Notes: Anjali Pottscatejuan 10/11/22  32yo female with complex PMH including frequent foreign body ingestion with numerous surgeries to remove objects and Hx esophageal perforation in 2019, GERD, complex psychiatric history including boderline personality disorder, anxiety/depression, and PTSD, ZOHRA on CPAP, Hx PE in November 2019, and asthma presenting with dysphonia.  Pt first became hoarse in May 2022 following an illness, reporting that she lost her voice completely for 3 weeks.  Pt was diagnosed with left vocal fold paresis by ENT in the ED on 8/22/22.  Since then, pt reports that her voice is hoarse all the time and sounds worse the more she talks.  She also feels that she has to breathe more frequently when she talks.  She has been unable to sing, noting reduced range in both her higher and lower pitches.  She experiences occasional pain in her throat.  She will get a tickle sensation in her throat at night, and will cough to the point of near emesis.  She notes that food will occasionally get stuck in her throat and she will have to vomit to  bring it up.  Please see chart for additional PMH.    Ed 8/23/22  . Swallowed ~15 cm wire from a mask at 1900 on 8/23 in setting of increased anxiety, not in suicide attempt per pt    ED 10/23/22  Nevin Alvarado is a 31 year old female with a pertinent history of morbid obesity, anxiety and depression, self arm with recurrent swallowed foreign bodies, who presents to this ED by EMS from [facility] for evaluation of a swallowed foreign body. Patient with a history of swallowing foreign bodies. Reports that today around 3:00 PM she swallowed the metal wiring from a surgical mask. She did not fold it up, pushed it down straight. Symptoms started 30-40 minutes later with a mild poking sensation that has progressed into a sharp, stabbing pain in her LUQ. Denies any associated nausea or vomiting. She reports a history of abdominal surgeries including cholecystectomy and appendectomy. She denies any chance of pregnancy. Denies any other complaints or concerns.  Radiology: CT neck 8/16/22    Pathology:    Procedures:    Labs:  Lab Results   Component Value Date    TSH 4.94 (H) 02/11/2022     Lab Results   Component Value Date     10/23/2022    CO2 26 10/23/2022    BUN 12 10/23/2022    CREAT 0.6 08/31/2020    PHOS 3.5 12/01/2019     Lab Results   Component Value Date    WBC 8.2 10/23/2022    HGB 12.3 10/23/2022    HCT 37.2 10/23/2022    MCV 88 10/23/2022     10/23/2022     Lab Results   Component Value Date    PTT 26 02/24/2021    INR 1.03 10/15/2022     No results found for: JULIA  No components found for: RHEUMATOIDFACTOR,  RF  Lab Results   Component Value Date    CRP 33.00 (H) 08/22/2022    CRP 26.0 (H) 10/31/2020    CRP 27.0 (H) 10/30/2020    CRP 23.0 (H) 10/11/2020    CRP 22.0 (H) 09/05/2020     No components found for: CKTOT, URICACID  No components found for: C3, C4, DSDNAAB, NDNAABIFA  No results found for: MPOAB    Patient reported Quality of Life (QOL) Measures   Patient Supplied Answers To I  Questionnaire  No flowsheet data found.      Patient Supplied Answers To EAT Questionnaire  No flowsheet data found.      Patient Supplied Answers To CSI Questionnaire  No flowsheet data found.      Patient Supplied Answers to Dyspnea Index Questionnaire:  No flowsheet data found.    Impression & Plan     IMPRESSION: Ms. Alvarado is a 31 year old female who is being seen for the followin. Dysphonia   - since May  - after URI  - voice was gone for 3 weeks  - now better but not normal  - both singing and speaking voice  - seen in the hospital on  with vocal fold paralysis on the left  - has complex history of multiple EGDs for foreign body ingestion, had esophageal perf in 2019 which required endoscopic procedures, no open procedures  - has coughing and vomiting as well  - had CT neck 2022 - no obvious lesions  - scope shows left vocal fold paralysis, postcricoid edema, right>left infraglottic prominence/granuloma   - discussed will review CT neck with neurorads - if clear then this is idiopathic vocal fold paralysis vs post-surgical after her perforation in , she can't remember if voice changes happened at that time  - discussed if this is idiopathic after her cold in May - it takes 1 year to allow for full recovery  - voice therapy for optimization both for vocal fold paralysis, as well as for irritable larynx syndrome and for vocal process granuloma/prominence   Plan  - voice therapy  - review CT neck with neurorads    2. Dysphagia  - in the setting of complex history of multiple EGDs for foreign body ingestion, had esophageal perf in 2019 which required endoscopic procedures, no open procedures  - feels foods like bread and meat get stuck  - no recent swallow evaluations  - just had a foreign body requiring EGD last night  - coughing up blood at times - likely esophageal   - does have nasal crusting today - so discussed vaseline for that   - discussed having GI follow up and swallow  evaluation  Plan  - will reach out to Dr Ulloa to set up follow up  - Xray Video Swallow Exam with esophagram with foods from home    3. Sleep apnea  - sleep evaluation     RETURN VISIT: end of January     Thank you for the kind referral and for allowing me to share in the care of Ms. Alvarado. If you have any questions, please do not hesitate to contact me.    Chrissy Simons MD    Laryngology    [facility]  Department of  Otolaryngology - Head and Neck Surgery  Clinics & Surgery Center  [address] [place] [address]  Appointment line: [phone]  Fax: [phone]  [email or web address]

## 2022-10-24 NOTE — PROGRESS NOTES
Nevin Alvarado was seen for allied health care provider visit in conjunction with Dr. Simons's [facility]. Introduced self and SLP roll in evaluation process. Currently pt reports difficulty with swallowing. She endorses foods getting stuck in her low throat including [employer] sandwiches and pasta. She endorses occasional symptoms of reflux. She has been having increased trouble swallowing since her esophageal perforation in 2019. She notes her mother tells her she eats to quickly. Speaking is clear. She does have a left vocal fold paralysis noted on scope exam today and is participating in voice therapy with Anjali Kim SLP. Encouraged her to continue with this therapy. Swallowing will be further evaluated with probematic foods via video swallow study and esophagram. She reported concern for the symptoms not occurring during the exam so we encouraged her to bring foods that cause her troubles. All questions answered within scope of practice for pt. Encouraged pt to reach out with any questions or concerns. Time spent with patient 14 minutes. No billable services provided during today's encounter as she will participate in video swallow study exam at a future time.       Judith Pryor MS, CCC-SLP  Speech-Language Pathology  [facility]  Department of Otolaryngology/D&T - 4th floor  Phone: [phone]  Email: [email or web address]

## 2022-10-24 NOTE — ANESTHESIA PROCEDURE NOTES
Airway       Patient location during procedure: OR       Procedure Start/Stop Times: 10/23/2022 8:37 PM  Staff -        Anesthesiologist:  John Mccauley MD       CRNA: Tristan Griffith APRN CRNA       Performed By: CRNA  Consent for Airway        Urgency: elective  Indications and Patient Condition       Indications for airway management: isma-procedural       Induction type:intravenous       Mask difficulty assessment: 0 - not attempted    Final Airway Details       Final airway type: endotracheal airway       Successful airway: ETT - single  Endotracheal Airway Details        ETT size (mm): 7.0       Cuffed: yes       Cuff volume (mL): 5       Successful intubation technique: video laryngoscopy       VL Blade Size: Glidescope 3       Grade View of Cords: 1       Adjucts: stylet       Position: Right       Measured from: lips       Secured at (cm): 21    Post intubation assessment        Placement verified by: capnometry, equal breath sounds and chest rise        Number of attempts at approach: 1       Ease of procedure: easy       Dentition: Unchanged and Intact       Dental guard used and removed.    Medication(s) Administered   Medication Administration Time: 10/23/2022 8:37 PM

## 2022-10-24 NOTE — ANESTHESIA CARE TRANSFER NOTE
Patient: Nevin Alvarado    Procedure: Procedure(s):  ESOPHAGOGASTRODUODENOSCOPY (EGD) FOR REMOVAL OF FOREIGN BODY       Diagnosis: Swallowed foreign body, initial encounter [T18.9XXA]  Diagnosis Additional Information: No value filed.    Anesthesia Type:   General     Note:    Oropharynx: oropharynx clear of all foreign objects and spontaneously breathing  Level of Consciousness: awake  Oxygen Supplementation: face mask    Independent Airway: airway patency satisfactory and stable  Dentition: dentition unchanged  Vital Signs Stable: post-procedure vital signs reviewed and stable  Report to RN Given: handoff report given  Patient transferred to: PACU    Handoff Report: Identifed the Patient, Identified the Reponsible Provider, Reviewed the pertinent medical history, Discussed the surgical course, Reviewed Intra-OP anesthesia mangement and issues during anesthesia, Set expectations for post-procedure period and Allowed opportunity for questions and acknowledgement of understanding      Vitals:  Vitals Value Taken Time   /89 10/23/22 2100   Temp 36.6  C (97.9  F) 10/23/22 2058   Pulse 107 10/23/22 2101   Resp 6 10/23/22 2101   SpO2 100 % 10/23/22 2101   Vitals shown include unvalidated device data.    Electronically Signed By: HU Benjamin CRNA  October 23, 2022  9:03 PM
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
## 2022-10-24 NOTE — PATIENT INSTRUCTIONS
1.  You were seen in the ENT Clinic today by . If you have any questions or concerns after your appointment, please call [phone]. Press option #1 for scheduling related needs. Press option #3 for Nurse advice.    2.   has recommended  the following:   - Xray video swallow and esophagram   - sleep evaluation to address sleep apnea    3.  Plan is to return to clinic 1/26/23      Hannah Rivera LPN  [phone]  [facility] - Otolaryngology

## 2022-10-25 ENCOUNTER — HOSPITAL ENCOUNTER (OUTPATIENT)
Dept: SPEECH THERAPY | Facility: CLINIC | Age: 32
Discharge: HOME | End: 2022-10-25
Payer: COMMERCIAL

## 2022-10-25 DIAGNOSIS — J38.01 PARESIS OF LEFT VOCAL CORD: ICD-10-CM

## 2022-10-25 DIAGNOSIS — R49.0 DYSPHONIA: Primary | ICD-10-CM

## 2022-10-25 PROCEDURE — 92507 TX SP LANG VOICE COMM INDIV: CPT | Mod: GN | Performed by: STUDENT IN AN ORGANIZED HEALTH CARE EDUCATION/TRAINING PROGRAM

## 2022-10-29 ENCOUNTER — HEALTH MAINTENANCE LETTER (OUTPATIENT)
Age: 32
End: 2022-10-29

## 2022-11-01 ENCOUNTER — HOSPITAL ENCOUNTER (OUTPATIENT)
Dept: SPEECH THERAPY | Facility: CLINIC | Age: 32
Discharge: HOME | End: 2022-11-01
Payer: COMMERCIAL

## 2022-11-01 DIAGNOSIS — J38.01 PARESIS OF LEFT VOCAL CORD: ICD-10-CM

## 2022-11-01 DIAGNOSIS — R49.0 DYSPHONIA: Primary | ICD-10-CM

## 2022-11-01 PROCEDURE — 92507 TX SP LANG VOICE COMM INDIV: CPT | Mod: GN | Performed by: STUDENT IN AN ORGANIZED HEALTH CARE EDUCATION/TRAINING PROGRAM

## 2022-11-03 ENCOUNTER — ANESTHESIA EVENT (OUTPATIENT)
Dept: SURGERY | Facility: CLINIC | Age: 32
End: 2022-11-03
Payer: COMMERCIAL

## 2022-11-03 ENCOUNTER — APPOINTMENT (OUTPATIENT)
Dept: RADIOLOGY | Facility: CLINIC | Age: 32
End: 2022-11-03
Attending: EMERGENCY MEDICINE
Payer: COMMERCIAL

## 2022-11-03 ENCOUNTER — ANESTHESIA (OUTPATIENT)
Dept: SURGERY | Facility: CLINIC | Age: 32
End: 2022-11-03
Payer: COMMERCIAL

## 2022-11-03 ENCOUNTER — HOSPITAL ENCOUNTER (OUTPATIENT)
Facility: CLINIC | Age: 32
Discharge: NURSING, REHAB OR LONG-TERM CARE | End: 2022-11-03
Attending: STUDENT IN AN ORGANIZED HEALTH CARE EDUCATION/TRAINING PROGRAM | Admitting: STUDENT IN AN ORGANIZED HEALTH CARE EDUCATION/TRAINING PROGRAM
Payer: COMMERCIAL

## 2022-11-03 VITALS
TEMPERATURE: 98 F | DIASTOLIC BLOOD PRESSURE: 69 MMHG | HEART RATE: 94 BPM | OXYGEN SATURATION: 98 % | RESPIRATION RATE: 20 BRPM | SYSTOLIC BLOOD PRESSURE: 159 MMHG

## 2022-11-03 DIAGNOSIS — T18.9XXA SWALLOWED FOREIGN BODY, INITIAL ENCOUNTER: Primary | ICD-10-CM

## 2022-11-03 LAB — UPPER GI ENDOSCOPY: NORMAL

## 2022-11-03 PROCEDURE — 250N000011 HC RX IP 250 OP 636: Performed by: NURSE ANESTHETIST, CERTIFIED REGISTERED

## 2022-11-03 PROCEDURE — 250N000009 HC RX 250: Performed by: NURSE ANESTHETIST, CERTIFIED REGISTERED

## 2022-11-03 PROCEDURE — 370N000017 HC ANESTHESIA TECHNICAL FEE, PER MIN: Performed by: INTERNAL MEDICINE

## 2022-11-03 PROCEDURE — 74018 RADEX ABDOMEN 1 VIEW: CPT

## 2022-11-03 PROCEDURE — 99284 EMERGENCY DEPT VISIT MOD MDM: CPT | Mod: 25

## 2022-11-03 PROCEDURE — 258N000003 HC RX IP 258 OP 636: Performed by: NURSE ANESTHETIST, CERTIFIED REGISTERED

## 2022-11-03 PROCEDURE — 71046 X-RAY EXAM CHEST 2 VIEWS: CPT

## 2022-11-03 PROCEDURE — 250N000013 HC RX MED GY IP 250 OP 250 PS 637: Performed by: STUDENT IN AN ORGANIZED HEALTH CARE EDUCATION/TRAINING PROGRAM

## 2022-11-03 PROCEDURE — 272N000001 HC OR GENERAL SUPPLY STERILE: Performed by: INTERNAL MEDICINE

## 2022-11-03 PROCEDURE — 710N000012 HC RECOVERY PHASE 2, PER MINUTE: Performed by: INTERNAL MEDICINE

## 2022-11-03 PROCEDURE — 250N000011 HC RX IP 250 OP 636: Performed by: STUDENT IN AN ORGANIZED HEALTH CARE EDUCATION/TRAINING PROGRAM

## 2022-11-03 PROCEDURE — 360N000075 HC SURGERY LEVEL 2, PER MIN: Performed by: INTERNAL MEDICINE

## 2022-11-03 PROCEDURE — 710N000010 HC RECOVERY PHASE 1, LEVEL 2, PER MIN: Performed by: INTERNAL MEDICINE

## 2022-11-03 PROCEDURE — 96374 THER/PROPH/DIAG INJ IV PUSH: CPT

## 2022-11-03 RX ORDER — PROPOFOL 10 MG/ML
INJECTION, EMULSION INTRAVENOUS PRN
Status: DISCONTINUED | OUTPATIENT
Start: 2022-11-03 | End: 2022-11-03

## 2022-11-03 RX ORDER — ONDANSETRON 2 MG/ML
4 INJECTION INTRAMUSCULAR; INTRAVENOUS EVERY 30 MIN PRN
Status: CANCELLED | OUTPATIENT
Start: 2022-11-03

## 2022-11-03 RX ORDER — SODIUM CHLORIDE, SODIUM LACTATE, POTASSIUM CHLORIDE, CALCIUM CHLORIDE 600; 310; 30; 20 MG/100ML; MG/100ML; MG/100ML; MG/100ML
INJECTION, SOLUTION INTRAVENOUS CONTINUOUS PRN
Status: DISCONTINUED | OUTPATIENT
Start: 2022-11-03 | End: 2022-11-03

## 2022-11-03 RX ORDER — LIDOCAINE 40 MG/G
CREAM TOPICAL
Status: CANCELLED | OUTPATIENT
Start: 2022-11-03

## 2022-11-03 RX ORDER — ONDANSETRON 2 MG/ML
INJECTION INTRAMUSCULAR; INTRAVENOUS PRN
Status: DISCONTINUED | OUTPATIENT
Start: 2022-11-03 | End: 2022-11-03

## 2022-11-03 RX ORDER — SODIUM CHLORIDE, SODIUM LACTATE, POTASSIUM CHLORIDE, CALCIUM CHLORIDE 600; 310; 30; 20 MG/100ML; MG/100ML; MG/100ML; MG/100ML
INJECTION, SOLUTION INTRAVENOUS CONTINUOUS
Status: CANCELLED | OUTPATIENT
Start: 2022-11-03

## 2022-11-03 RX ORDER — KETOROLAC TROMETHAMINE 15 MG/ML
15 INJECTION, SOLUTION INTRAMUSCULAR; INTRAVENOUS ONCE
Status: COMPLETED | OUTPATIENT
Start: 2022-11-03 | End: 2022-11-03

## 2022-11-03 RX ORDER — LIDOCAINE HYDROCHLORIDE 10 MG/ML
INJECTION, SOLUTION INFILTRATION; PERINEURAL PRN
Status: DISCONTINUED | OUTPATIENT
Start: 2022-11-03 | End: 2022-11-03

## 2022-11-03 RX ORDER — ACETAMINOPHEN 325 MG/1
975 TABLET ORAL ONCE
Status: COMPLETED | OUTPATIENT
Start: 2022-11-03 | End: 2022-11-03

## 2022-11-03 RX ORDER — ONDANSETRON 4 MG/1
4 TABLET, ORALLY DISINTEGRATING ORAL EVERY 30 MIN PRN
Status: CANCELLED | OUTPATIENT
Start: 2022-11-03

## 2022-11-03 RX ORDER — DEXAMETHASONE SODIUM PHOSPHATE 10 MG/ML
INJECTION, SOLUTION INTRAMUSCULAR; INTRAVENOUS PRN
Status: DISCONTINUED | OUTPATIENT
Start: 2022-11-03 | End: 2022-11-03

## 2022-11-03 RX ORDER — PROPOFOL 10 MG/ML
INJECTION, EMULSION INTRAVENOUS CONTINUOUS PRN
Status: DISCONTINUED | OUTPATIENT
Start: 2022-11-03 | End: 2022-11-03

## 2022-11-03 RX ADMIN — SODIUM CHLORIDE, POTASSIUM CHLORIDE, SODIUM LACTATE AND CALCIUM CHLORIDE: 600; 310; 30; 20 INJECTION, SOLUTION INTRAVENOUS at 21:37

## 2022-11-03 RX ADMIN — DEXAMETHASONE SODIUM PHOSPHATE 10 MG: 10 INJECTION, SOLUTION INTRAMUSCULAR; INTRAVENOUS at 21:47

## 2022-11-03 RX ADMIN — ONDANSETRON 4 MG: 2 INJECTION INTRAMUSCULAR; INTRAVENOUS at 21:42

## 2022-11-03 RX ADMIN — PROPOFOL 100 MCG/KG/MIN: 10 INJECTION, EMULSION INTRAVENOUS at 21:46

## 2022-11-03 RX ADMIN — ACETAMINOPHEN 975 MG: 325 TABLET, FILM COATED ORAL at 19:55

## 2022-11-03 RX ADMIN — PROPOFOL 50 MG: 10 INJECTION, EMULSION INTRAVENOUS at 21:42

## 2022-11-03 RX ADMIN — LIDOCAINE HYDROCHLORIDE 10 MG: 10 INJECTION, SOLUTION INFILTRATION; PERINEURAL at 21:42

## 2022-11-03 RX ADMIN — KETOROLAC TROMETHAMINE 15 MG: 15 INJECTION, SOLUTION INTRAMUSCULAR; INTRAVENOUS at 21:17

## 2022-11-03 RX ADMIN — PROPOFOL 50 MG: 10 INJECTION, EMULSION INTRAVENOUS at 21:48

## 2022-11-03 ASSESSMENT — ACTIVITIES OF DAILY LIVING (ADL): ADLS_ACUITY_SCORE: 40

## 2022-11-03 NOTE — ED TRIAGE NOTES
"Pt presents after swallowing foreign body. Pt reports \"she was triggered when she saw the twist tie on the floor\". Pt reports \"those type of this are suppose to be kept away\". Pt reports LUQ pain. ABCs intact.      Triage Assessment     Row Name 11/03/22 7084       Triage Assessment (Adult)    Airway WDL WDL       Respiratory WDL    Respiratory WDL WDL       Skin Circulation/Temperature WDL    Skin Circulation/Temperature WDL WDL       Cardiac WDL    Cardiac WDL WDL       Peripheral/Neurovascular WDL    Peripheral Neurovascular WDL WDL       Cognitive/Neuro/Behavioral WDL    Cognitive/Neuro/Behavioral WDL WDL              "

## 2022-11-04 ENCOUNTER — ANCILLARY PROCEDURE (OUTPATIENT)
Dept: GENERAL RADIOLOGY | Facility: CLINIC | Age: 32
End: 2022-11-04
Attending: OTOLARYNGOLOGY
Payer: COMMERCIAL

## 2022-11-04 ENCOUNTER — THERAPY VISIT (OUTPATIENT)
Dept: SPEECH THERAPY | Facility: CLINIC | Age: 32
End: 2022-11-04
Payer: COMMERCIAL

## 2022-11-04 ENCOUNTER — DOCUMENTATION ONLY (OUTPATIENT)
Dept: OTOLARYNGOLOGY | Facility: CLINIC | Age: 32
End: 2022-11-04

## 2022-11-04 DIAGNOSIS — R13.12 OROPHARYNGEAL DYSPHAGIA: ICD-10-CM

## 2022-11-04 DIAGNOSIS — R13.14 PHARYNGOESOPHAGEAL DYSPHAGIA: Primary | ICD-10-CM

## 2022-11-04 PROCEDURE — 92610 EVALUATE SWALLOWING FUNCTION: CPT | Mod: GN | Performed by: SPEECH-LANGUAGE PATHOLOGIST

## 2022-11-04 PROCEDURE — 74230 X-RAY XM SWLNG FUNCJ C+: CPT | Mod: GC | Performed by: RADIOLOGY

## 2022-11-04 PROCEDURE — 92611 MOTION FLUOROSCOPY/SWALLOW: CPT | Mod: GN | Performed by: SPEECH-LANGUAGE PATHOLOGIST

## 2022-11-04 RX ORDER — BARIUM SULFATE 400 MG/ML
30 SUSPENSION ORAL ONCE
Status: COMPLETED | OUTPATIENT
Start: 2022-11-04 | End: 2022-11-04

## 2022-11-04 RX ADMIN — BARIUM SULFATE 30 ML: 400 SUSPENSION ORAL at 13:59
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
## 2022-11-04 NOTE — CONSULTS
I have reviewed the ER, H&P that is linked to this encounter, and examined the patient. There are no significant changes.  Cruz Kumar MD

## 2022-11-04 NOTE — ANESTHESIA POSTPROCEDURE EVALUATION
Patient: Nevin Alvarado    Procedure: Procedure(s):  ESOPHAGOGASTRODUODENOSCOPY (EGD) for foreign body removal       Anesthesia Type:  General    Note:     Postop Pain Control: Uneventful            Sign Out: Well controlled pain   PONV: No   Neuro/Psych: Uneventful            Sign Out: Acceptable/Baseline neuro status   Airway/Respiratory: Uneventful            Sign Out: Acceptable/Baseline resp. status   CV/Hemodynamics: Uneventful            Sign Out: Acceptable CV status; No obvious hypovolemia; No obvious fluid overload   Other NRE: NONE   DID A NON-ROUTINE EVENT OCCUR? No           Last vitals:  Vitals Value Taken Time   /94 11/03/22 2221   Temp 36.9  C (98.5  F) 11/03/22 2210   Pulse 102 11/03/22 2224   Resp 35 11/03/22 2224   SpO2 95 % 11/03/22 2224   Vitals shown include unvalidated device data.    Electronically Signed By: Kamar Young MD  November 3, 2022  10:53 PM

## 2022-11-04 NOTE — H&P
GENERAL PRE-PROCEDURE:   Procedure:  EGD    Verbal consent obtained?: Yes    Written consent obtained?: Yes    Risks and benefits: Risks, benefits and alternatives were discussed    Consent given by:  Guardian  Patient states understanding of procedure being performed: Yes    Patient's understanding of procedure matches consent: Yes    Procedure consent matches procedure scheduled: Yes    Expected level of sedation:  Deep  Appropriately NPO:  Yes  ASA Class:  2  Lungs:  Lungs clear with good breath sounds bilaterally  Heart:  Normal heart sounds and rate  History & Physical reviewed:  History and physical reviewed and no updates needed  Statement of review:  I have reviewed the lab findings, diagnostic data, medications, and the plan for sedation

## 2022-11-04 NOTE — ANESTHESIA CARE TRANSFER NOTE
Patient: Nevin Alvarado    Procedure: Procedure(s):  ESOPHAGOGASTRODUODENOSCOPY (EGD) for foreign body removal       Diagnosis: Swallowed foreign body, initial encounter [T18.9XXA]  Diagnosis Additional Information: No value filed.    Anesthesia Type:   General     Note:    Oropharynx: oropharynx clear of all foreign objects  Level of Consciousness: awake  Oxygen Supplementation: face mask  Level of Supplemental Oxygen (L/min / FiO2): 6  Independent Airway: airway patency satisfactory and stable  Dentition: dentition unchanged  Vital Signs Stable: post-procedure vital signs reviewed and stable  Report to RN Given: handoff report given  Patient transferred to: PACU    Handoff Report: Identifed the Patient, Identified the Reponsible Provider, Reviewed the pertinent medical history, Discussed the surgical course, Reviewed Intra-OP anesthesia mangement and issues during anesthesia, Set expectations for post-procedure period and Allowed opportunity for questions and acknowledgement of understanding      Vitals:  Vitals Value Taken Time   /80    Temp 98.5f    Pulse 97    Resp 20    SpO2 96        Electronically Signed By: HU Lopez CRNA  November 3, 2022  10:10 PM

## 2022-11-04 NOTE — PROGRESS NOTES
Speech-Language Pathology Department   EVALUATION  [facility]  VIDEO SWALLOW STUDY RESULTS        11/04/22 1000       Present No   General Information   Type Of Visit Initial   Start Of Care Date 11/04/22   Referring Physician Chrissy Simons MD   Orders Evaluate And Treat   Orders Comment Video swallow study   Medical Diagnosis Dysphagia   Onset Of Illness/injury Or Date Of Surgery 10/24/22   Precautions/limitations No Known Precautions/limitations   Hearing WFL for conversational speech production   Pertinent History of Current Problem/OT: Additional Occupational Profile Info Pt is a 31 year old female with a PMH significant for ADD/ADHD, anxiety, anorexia with bulimia, borderline personality disorder, depression, self-harm, suicide attempt, neuropathy, PTSD, pulmonary emboli, self-injurious behavior, recurrent rectal foreign body placement, speech apnea, and recurrent swallowing of foreign body, L VF paralysis, and syncope.  Of note, pt was in the ED previous date after swallowing a metal twist tie which needed extraction via GI service.  Pt was referred for a VFSS following an ENT clinic visit where she reported difficulty swallowing items such as Jersey Jordan s sandwiches, pasta as they result in a sensation of getting stuck lower in her throat.  Pt reported that there are times that she needs to throw items up to clear.  Pe denies difficulty on liquids.  Pt reported that this has been occurring since esophageal perforation in 2019.  Pt also reports symptoms of reflux.  History is negative for PNA.  Pt is participating in voice therapy with Anjali Kim and has been seen for sessions x2.   Respiratory Status Room air   Prior Level Of Function Swallowing   Prior Level Of Function Comment Regular textures and thin liquids   General Observations Pt is pleasant and cooperative.  PCA present.   Patient/family Goals Goals not formally stated at  the time of evaluation.   Clinical Swallow Evaluation   Oral Musculature generally intact   Structural Abnormalities none present   Dentition present and adequate   Mucosal Quality good   Mandibular Strength and Mobility intact   Oral Labial Strength and Mobility other (see comments)  (Flat nasolabial fold on the R)   Lingual Strength and Mobility WFL;other (see comments)  (Lingual deviation to the L upon protrousion)   Laryngeal Function Cough;Throat clear;Swallow;Voicing initiated;Dry swallow palpated   Oral Musculature Comments WFL   Additional Documentation Yes   Additional evaluation(s) completed today Yes   Rationale for completing additional evaluation View pharyngeal phase and r/o globus.   Clinical Swallow Eval: Thin Liquid Texture Trial   Mode of Presentation, Thin Liquids cup;self-fed   Volume of Liquid or Food Presented single sips x3   Oral Phase of Swallow WFL   Pharyngeal Phase of Swallow intact   Diagnostic Statement Functional swallow for thin liquids.  No overt s/s of aspiration.   VFSS Evaluation   VFSS Additional Documentation Yes   VFSS Eval: Radiology   Radiologist [facility] Rad[facility] Resident   Views Taken left lateral;A/P   Physical Location of Procedure [facility] #2   VFSS Eval: Thin Liquid Texture Trial   Mode of Presentation, Thin Liquid cup;straw;self-fed   Order of Presentation 1, 2, 3, 4, 5, 6, 19 (AP)  (Trials 1-3 Omnipaque, remaining trials completed with barium)   Preparatory Phase WFL   Oral Phase, Thin Liquid Premature pharyngeal entry   Pharyngeal Phase, Thin Liquid WFL;Residue in pyriform sinus  (Minimal)   Rosenbek's Penetration Aspiration Scale: Thin Liquid Trial Results 1 - no aspiration, contrast does not enter airway   Diagnostic Statement Premature pharyngeal entry but no penetration or aspiration.  Minimal residue remaining in the pyriforms.   VFSS Eval: Mildly Thick Liquids    Mode of Presentation cup;self-fed   Order of Presentation 7, 8    Preparatory Phase WFL   Oral Phase WFL   Pharyngeal Phase WFL   Rosenbek's Penetration Aspiration Scale 1 - no aspiration, contrast does not enter airway   Diagnostic Statement No penetration or aspiration.   VFSS Eval: Puree Solid Texture Trial   Mode of Presentation, Puree spoon;self-fed   Order of Presentation 9   Preparatory Phase WFL   Oral Phase, Puree WFL   Pharyngeal Phase, Puree WFL   Rosenbek's Penetration Aspiration Scale: Puree Food Trial Results 1 - no aspiration, contrast does not enter airway   Diagnostic Statement No penetration or aspiration.   VFSS Eval: Regular Texture Trial (Solid)   Mode of Presentation self-fed   Order of Presentation 10, 11, 12, 13, 14, 15, 16, 17, 18 (barium tablet), 20, 21   Preparatory Phase WFL   Oral Phase WFL   Pharyngeal Phase WFL   Rosenbek's Penetration Aspiration Scale 1 - no aspiration, contrast does not enter airway   Diagnostic Statement No penetration or aspiration.  No pharyngeal residue.  Pt denies a sense of globus.  Barium tablet passed with ease through the pharynx.   Swallow Compensations   Swallow Compensations No compensations were used   Results No difficulties noted   Educational Assessment   Barriers to Learning No barriers   Esophageal Phase of Swallow   Patient reports or presents with symptoms of esophageal dysphagia Yes   Esophageal sweep performed during today s vidofluoroscopic exam  Yes;Please refer to radiologist's report for details   Esophageal comments Esophageal sweep performed on AP trials.  Full esophagram completed following VFSS.   Swallow Eval: Clinical Impressions   Skilled Criteria for Therapy Intervention No problems identified which require skilled intervention   Dysphagia Outcome Severity Scale (SHARITA) Level 6 - SHARITA   Treatment Diagnosis Oropharyngeal swallow responses WFL   Diet texture recommendations Regular diet;Thin liquids (level 0)   Demonstrates Need for Referral to Another Service other (see comments)  (GI)    Anticipated Discharge Disposition home   Risks and Benefits of Treatment have been explained. Yes   Patient, family and/or staff in agreement with Plan of Care Yes   Clinical Impression Comments Pt presents with functional oropharyngeal swallow responses.  Oral mech examination significant for flat nasolabial fold on R and L lingual deviation upon protrousion.  Omnipaque contrast initially used due to concern for possible esophageal perforation in the setting of ED visit previous date; there was no leak visualized upon esophageal sweep in AP view and the remainder of the study was completed with barium.  Trials under flouroscopy revealed premature pharyngeal entry but no penetration or aspiration.  No pharyngeal residue observed.  Solid textures were challenge items from home including grapes and a sandwich.  Pt denied globus throughout the evaluation.  A full esophagram was completed following the VFSS.  At this time, pt is appopriate for a regular texture diet and thin liquids.  No compensatory strategies were indicated.  Further ST for dysphagia is not indicated; however, it is recommeded that pt continue with SLP for voice and follow up with GI service.  Pt was educated on these results and recommendations.  Pt reported understanding and agreement.   Total Session Time   SLP Eval: oral/pharyngeal swallow function, clinical minutes ([id number]) 15   SLP Eval: VideoFluoroscopic Swallow function Minutes ([id number]) 30   Total Evaluation Time 45

## 2022-11-04 NOTE — PROGRESS NOTES
CT neck also reviewed with neurorads - no lesion along RLN    Video Esophagram Review Rounds  Imaging Review of MBSS conducted with attending physician Chrissy Simons and reviewed/discussed with SLP Judith Pryor, Alma Oliva, Mayela Alvarado, and/or Yelitza Farrar    Relevant Background:    Esophagram: 11/4/22  1. No penetration or aspiration. See same day speech pathology note  for additional details regarding swallow portion of the exam.  2. No stricture, filling defect, or definite hiatal hernia.  3. Limited esophageal motility evaluation due to impaired patient  mobility, though the esophagus was patulous with some evidence of  dysmotility.  4. Dense barium limits mucosal evaluation of the distal esophagus on  double contrast portion. No obvious mucosal abnormality within the  upstream esophagus.    MBSS Date: 11/4/22    Findings:  Pharyngeal Weakness:No  Epiglottic dysfunction: No  Penetration: No  Aspiration: No  UES dysfunction: No  Details: safe swallow      Recommendations:  Diet: current

## 2022-11-04 NOTE — ED PROVIDER NOTES
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 changes or additions, unless otherwise noted in the HPI.    Current Medications     acetaminophen (TYLENOL) 325 MG tablet  albuterol (PROAIR HFA/PROVENTIL HFA/VENTOLIN HFA) 108 (90 Base) MCG/ACT inhaler  albuterol (PROVENTIL) (2.5 MG/3ML) 0.083% neb solution  alum & mag hydroxide-simethicone (MAALOX MAX) 400-400-40 MG/5ML SUSP suspension  brexpiprazole (REXULTI) 0.5 MG tablet  busPIRone  (BUSPAR) 10 MG tablet  busPIRone (BUSPAR) 10 MG tablet  cetirizine (ZYRTEC) 10 MG tablet  Cholecalciferol (D3 HIGH POTENCY) 25 MCG (1000 UT) CAPS  Cholecalciferol (VITAMIN D) 50 MCG (2000 UT) CAPS  cholecalciferol 25 MCG (1000 UT) TABS  desvenlafaxine (PRISTIQ) 100 MG 24 hr tablet  ferrous sulfate (FEROSUL) 325 (65 Fe) MG tablet  furosemide (LASIX) 20 MG tablet  furosemide (LASIX) 20 MG tablet  hydrochlorothiazide (HYDRODIURIL) 25 MG tablet  hydroxychloroquine (PLAQUENIL) 200 MG tablet  hydroxychloroquine (PLAQUENIL) 200 MG tablet  ibuprofen (ADVIL/MOTRIN) 600 MG tablet  ibuprofen (ADVIL/MOTRIN) 600 MG tablet  ibuprofen (ADVIL/MOTRIN) 600 MG tablet  lidocaine, viscous, (XYLOCAINE) 2 % solution  lurasidone (LATUDA) 40 MG TABS tablet  medroxyPROGESTERone (PROVERA) 10 MG tablet  metFORMIN (GLUCOPHAGE XR) 500 MG 24 hr tablet  montelukast (SINGULAIR) 10 MG tablet  OLANZapine (ZYPREXA) 2.5 MG tablet  OLANZapine (ZYPREXA) 2.5 MG tablet  ondansetron (ZOFRAN ODT) 4 MG ODT tab  ondansetron (ZOFRAN-ODT) 4 MG ODT tab  pregabalin (LYRICA) 100 MG capsule  Respiratory Therapy Supplies (NEBULIZER) BRENDAN  sucralfate (CARAFATE) 1 GM tablet  SUMAtriptan (IMITREX) 25 MG tablet  valACYclovir (VALTREX) 1000 mg tablet        Review of Systems     GI: Endorses swallowing a foreign body and LUQ pain    Remainder of systems reviewed, unless noted in HPI all others negative.    Physical Exam     BP (!) 159/69   Pulse 94   Temp 98  F (36.7  C) (Temporal)   Resp 20   LMP 10/07/2022 (Approximate)   SpO2 98%   Constitutional: Awake, alert, in no acute distress.      Head: Normocephalic, atraumatic.      ENT: Mucous membranes moist.      Eyes: Conjunctiva normal.      Respiratory: Respirations even, unlabored.  Cardiovascular: Regular rate and rhythm. +2 radial pulses, equal bilaterally. No pitting edema.      GI: Abdomen soft, mild left upper quadrant/epigastric tenderness  Musculoskeletal: Moves all 4 extremities equally, strength  symmetrical on bilateral uppers and lowers.      Integument: Warm, dry.   Neurologic: Alert & oriented x 3. Normal speech. Grossly normal motor and sensory function.   Psychiatric: Normal mood    Labs & Imaging     Results for orders placed or performed during the hospital encounter of 11/03/22   Result Value Ref Range    Upper GI Endoscopy       [facility]  [address] [facility] [place] [address]  _______________________________________________________________________________  Patient Name: Nevin Alvarado     Procedure Date: 11/3/2022 9:27 PM  MRN: [id number]                       Account Number: [id number]  YOB: 1990             Admit Type: Emergency Department  Age: 31                               Room: [place] [address]  Note Status: Finalized                Attending MD: SCOTT PIMENTEL MD  Total Sedation Time:                  Instrument Name: EGD Scope 276  _______________________________________________________________________________     Procedure:             Upper GI endoscopy  Indications:           Foreign body in the stomach  Providers:             SCOTT PIMENTEL MD  Referring MD:            Medicines:             Monitored Anesthesia Care  Complications:         No immediate complications.  ___________________________________________________________________________ ____  Procedure:             Pre-Anesthesia Assessment:                         - Prior to the procedure, a History and Physical was                          performed, and patient medications and allergies were                          reviewed. The risks and benefits of the procedure and                          the sedation options and risks were discussed with the                          patient's guardian. All questions were answered and                          informed consent was obtained. Patient identification                          and proposed procedure were verified by the  physician,                          the nurse and the anesthesiologist in the                          pre-procedure area in the procedure room in the                          endoscopy suite. Mental Status Examination: alert and                          oriented. Airway Examination: normal oropharyngeal                          airway and neck mobility. Respiratory Examination:                           clear to auscultation. CV Examination: regular rate                          and rhythm. Prophylactic Antibiotics: The patient does                          not require prophylactic antibiotics. Prior                          Anticoagulants: The patient has taken no anticoagulant                          or antiplatelet agents. After reviewing the risks and                          benefits, the patient was deemed in satisfactory                          condition to undergo the procedure. The anesthesia                          plan was to use monitored anesthesia care (MAC).                          Immediately prior to administration of medications,                          the patient was re-assessed for adequacy to receive                          sedatives. The physical status of the patient was                          re-assessed after the procedure.                         After obtaining informed consent, the endoscope was                          passed under direct visio n. Throughout the procedure,                          the patient's blood pressure, pulse, and oxygen                          saturations were monitored continuously. The endsocope                          was introduced through the mouth, and advanced to the                          second part of duodenum. The upper GI endoscopy was                          accomplished without difficulty. The patient tolerated                          the procedure well.                                                                                    Findings:       The esophagus was normal.       Metallic wire was found in the gastric body. Removal was accomplished        with a rat-toothed forceps.       The examined duodenum was normal.                                                                                   Moderate Sedation:       MAC  Impression:            - Normal esophagus.                         - Metallic wire was found in the stomach. Removal was                          succ essful.                         - Normal examined duodenum.  Recommendation:        - Return patient to ER for ongoing care.                                                                                     Scott Kumar MD  ______________________  SCOTT KUMAR MD  11/3/2022 10:02:39 PM  I was physically present for the entire viewing portion of the exam.  __________________________  Signature of teaching physician  [id number]/[id number] KUMAR MD  Number of Addenda: 0    Note Initiated On: 11/3/2022 9:27 PM  Scope In: 9:51:09 PM  Scope Out: 9:54:37 PM     Abdomen XR 1 vw    Impression    IMPRESSION: Again seen is a linear metallic object over the abdomen. This has not changed in position from the comparison CT study from 10/23/2022.    Chest XR,  PA & LAT    Impression    IMPRESSION:     Linear metallic foreign body within the midline upper abdomen, better evaluated on the separately dictated abdominal radiograph. Cholecystectomy clips.    No radiopaque foreign bodies within the chest.    Mildly elevated right hemidiaphragm, unchanged. No focal airspace opacities, pleural effusions, or pneumothorax. Nonenlarged cardiomediastinal silhouette.        Fausto Beatty MD  11/03/22 1229

## 2022-11-08 ENCOUNTER — APPOINTMENT (OUTPATIENT)
Dept: RADIOLOGY | Facility: CLINIC | Age: 32
End: 2022-11-08
Attending: EMERGENCY MEDICINE
Payer: COMMERCIAL

## 2022-11-08 ENCOUNTER — APPOINTMENT (OUTPATIENT)
Dept: CT IMAGING | Facility: CLINIC | Age: 32
End: 2022-11-08
Attending: EMERGENCY MEDICINE
Payer: COMMERCIAL

## 2022-11-08 ENCOUNTER — HOSPITAL ENCOUNTER (EMERGENCY)
Facility: CLINIC | Age: 32
Discharge: HOME | End: 2022-11-09
Attending: EMERGENCY MEDICINE | Admitting: EMERGENCY MEDICINE
Payer: COMMERCIAL

## 2022-11-08 VITALS
SYSTOLIC BLOOD PRESSURE: 149 MMHG | HEIGHT: 62 IN | HEART RATE: 99 BPM | WEIGHT: 293 LBS | BODY MASS INDEX: 53.92 KG/M2 | DIASTOLIC BLOOD PRESSURE: 82 MMHG | OXYGEN SATURATION: 95 % | TEMPERATURE: 98.6 F | RESPIRATION RATE: 18 BRPM

## 2022-11-08 DIAGNOSIS — T07.XXXA MULTIPLE CONTUSIONS: ICD-10-CM
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
## 2022-11-08 PROCEDURE — 250N000013 HC RX MED GY IP 250 OP 250 PS 637: Performed by: EMERGENCY MEDICINE

## 2022-11-08 PROCEDURE — 72125 CT NECK SPINE W/O DYE: CPT

## 2022-11-08 PROCEDURE — 72072 X-RAY EXAM THORAC SPINE 3VWS: CPT

## 2022-11-08 PROCEDURE — 72100 X-RAY EXAM L-S SPINE 2/3 VWS: CPT

## 2022-11-08 PROCEDURE — 99285 EMERGENCY DEPT VISIT HI MDM: CPT | Mod: 25

## 2022-11-08 RX ORDER — ACETAMINOPHEN 325 MG/1
650 TABLET ORAL ONCE
Status: COMPLETED | OUTPATIENT
Start: 2022-11-08 | End: 2022-11-08

## 2022-11-08 RX ADMIN — ACETAMINOPHEN 650 MG: 325 TABLET, FILM COATED ORAL at 22:36

## 2022-11-08 ASSESSMENT — ACTIVITIES OF DAILY LIVING (ADL): ADLS_ACUITY_SCORE: 40

## 2022-11-09 ASSESSMENT — ACTIVITIES OF DAILY LIVING (ADL): ADLS_ACUITY_SCORE: 40

## 2022-11-09 NOTE — ED PROVIDER NOTES
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 Allergy; HUT Severity: Low; HUT Noted: [id number]  Other reaction(s): Rash       Oseltamivir Hives     med stopped, PN: med stopped  med stopped, PN: med stopped; HUT Comment: med stopped, PN: med stopped; HUT Reaction: Hives; HUT Reaction Type: Allergy; HUT Severity: Med; HUT Noted: [id number]     Penicillins  Anaphylaxis     HUT Reaction: Anaphylaxis; HUT Reaction Type: Allergy; HUT Severity: High; HUT Noted: [id number]     Vancomycin Itching, Swelling and Rash     Other reaction(s): Redness  Flushed face, very itchy; HUT Comment: Flushed face, very itchy; HUT Reaction: Angioedema; HUT Reaction: Redness; HUT Severity: Med; HUT Noted: [id number]    facial     Hydrocodone Nausea and Vomiting and GI Disturbance     vomiting for days, PN: vomiting for days; HUT Comment: vomiting for days; HUT Reaction: Gastrointestinal; HUT Reaction: Nausea And Vomiting; HUT Reaction Type: Intolerance; HUT Severity: Med; HUT Noted: [id number]  vomiting for days       Blood-Group Specific Substance Other (See Comments)     Patient has an anti-Cw and non-specific antibodies. Blood product orders may be delayed. Draw one red top and two purple top tubes for all type/screen/crossmatch orders.  Patient has anti-Cw, anti-K (Angella), Warm auto and nonspecific antibodies. Blood products may be delayed. Draw patient 24 hours prior to transfusion. Draw one red top and two purple top tubes for all type and screen orders.     Clavulanic Acid Angioedema     Fentanyl Itching     Naltrexone Other (See Comments)     Reaction(s): Vivid dreams.     Other Drug Allergy (See Comments)      See original file MRN [id number]. Files are marked for merge     Oxycodone Swelling     Adhesive Tape Rash     Silicone type     Band-Aid Anti-Itch      Other reaction(s): adhesive allergy     Cephalosporins Rash     Lamotrigine Rash     Possibly associated with Lamictal.   HUT Comment: Possibly associated with Lamictal. ; HUT Reaction: Rash; HUT Reaction Type: Allergy; HUT Severity: Low; HUT Noted: [id number]       FAMILY HISTORY:  Family History   Problem Relation Age of Onset     Diabetes Type 2  Maternal Grandmother      Diabetes Type 2  Paternal Grandmother      Breast Cancer Paternal Grandmother      Cerebrovascular Disease Father 53     Myocardial Infarction No family hx of       "Coronary Artery Disease Early Onset No family hx of      Ovarian Cancer No family hx of      Colon Cancer No family hx of      Depression Mother      Anxiety Disorder Mother        SOCIAL HISTORY:   Social History     Socioeconomic History     Marital status: Single   Occupational History     Occupation: On disability   Tobacco Use     Smoking status: Never     Smokeless tobacco: Never   Substance and Sexual Activity     Alcohol use: No     Alcohol/week: 0.0 standard drinks     Drug use: No     Sexual activity: Not Currently     Partners: Male     Birth control/protection: I.U.D.     Comment: IUD - Mirena - placed July, 2015   Social History Narrative    Single.    Living in independent living portion of [employer].    On disability.    No regular exercise.    No drugs, alcohol, or tobacco.    VITALS:  BP (!) 149/82   Pulse 99   Temp 98.6  F (37  C) (Oral)   Resp 18   Ht 1.575 m (5' 2\")   Wt 136.5 kg (301 lb)   LMP 10/06/2022 (Approximate)   SpO2 95%   BMI 55.05 kg/m      PHYSICAL EXAM    Vital Signs:  BP (!) 149/82   Pulse 99   Temp 98.6  F (37  C) (Oral)   Resp 18   Ht 1.575 m (5' 2\")   Wt 136.5 kg (301 lb)   LMP 10/06/2022 (Approximate)   SpO2 95%   BMI 55.05 kg/m    General:  On entering the room she is in no apparent distress.  Morbid obesity noted BMI over 55.  Neck:  Neck supple with full range of motion and nontender over the spine, but mildly tender in the paracervical muscles..    Back:  Back and spine are tender.  No costovertebral angle tenderness.    HEENT:  Oropharynx clear with moist mucous membranes.  HEENT unremarkable.    Pulmonary:  Chest clear to auscultation without rhonchi rales or wheezing.    Cardiovascular:  Cardiac regular rate and rhythm without murmurs rubs or gallops.    Abdomen:  Abdomen soft nontender.  There is no rebound or guarding.    Muskuloskeletal:  she moves all 4 without any difficulty and has normal neurovascular exams.  Extremities without clubbing, " cyanosis, or edema.  Legs and calves are nontender.    Neuro:  she is alert and oriented ×3 and moves all extremities symmetrically.  Strength 5/5 in all 4 extremities.  Sensation intact in all 4 extremities.  Psych:  Normal affect.    Skin:  Unremarkable and warm and dry.       LAB:  All pertinent labs reviewed and interpreted.  Labs Ordered and Resulted from Time of ED Arrival to Time of ED Departure - No data to display    RADIOLOGY:  Reviewed all pertinent imaging. Please see official radiology report.  CT Cervical Spine w/o Contrast   Final Result   IMPRESSION:   1.  No fracture or posttraumatic subluxation.         Lumbar spine XR, 2-3 views   Final Result      Thoracic spine XR, 3 views   Final Result                     Nish Saucedo M.D.  Emergency Medicine  [facility] EMERGENCY ROOM  [address] [facility] [address] [phone]  Dept: [phone]       Nish Saucedo MD  11/09/22 0025

## 2022-11-09 NOTE — DISCHARGE INSTRUCTIONS
Ice or heat off-and-on to sore areas can help with pain.  Over-the-counter Tylenol or ibuprofen as tolerated every 6 hours can help with the pain.  See your doctor if not better in the next 1 to 2 weeks.

## 2022-11-09 NOTE — ED TRIAGE NOTES
Pt presents to ED via EMS with reports of a fall in the shower around 1900.  EMS reports they were called out for eval at the time of fall, pt checked out and declined transfer.  Pt reports about 1 hour after falling she began to have right sided numbness starting at her neck down to her hip bone.  Pt requested a c-collar from EMS.  CMS intact.       Triage Assessment     Row Name 11/08/22 1074       Triage Assessment (Adult)    Airway WDL WDL       Respiratory WDL    Respiratory WDL WDL       Skin Circulation/Temperature WDL    Skin Circulation/Temperature WDL WDL       Cardiac WDL    Cardiac WDL WDL       Peripheral/Neurovascular WDL    Peripheral Neurovascular WDL X  reports numbness on right side of neck down to right low back.  CMS intact    Capillary Refill, General less than/equal to 3 secs       Cognitive/Neuro/Behavioral WDL    Cognitive/Neuro/Behavioral WDL WDL       [place] Coma Scale    Best Eye Response 4-->(E4) spontaneous    Best Motor Response 6-->(M6) obeys commands    Best Verbal Response 5-->(V5) oriented    [place] Coma Scale Score 15

## 2022-11-14 ENCOUNTER — APPOINTMENT (OUTPATIENT)
Dept: GENERAL RADIOLOGY | Facility: CLINIC | Age: 32
End: 2022-11-14
Attending: EMERGENCY MEDICINE
Payer: COMMERCIAL

## 2022-11-14 ENCOUNTER — APPOINTMENT (OUTPATIENT)
Dept: ULTRASOUND IMAGING | Facility: CLINIC | Age: 32
End: 2022-11-14
Attending: EMERGENCY MEDICINE
Payer: COMMERCIAL

## 2022-11-14 ENCOUNTER — HOSPITAL ENCOUNTER (EMERGENCY)
Facility: CLINIC | Age: 32
Discharge: HOME | End: 2022-11-15
Attending: EMERGENCY MEDICINE | Admitting: EMERGENCY MEDICINE
Payer: COMMERCIAL

## 2022-11-14 VITALS
OXYGEN SATURATION: 95 % | RESPIRATION RATE: 18 BRPM | HEART RATE: 107 BPM | DIASTOLIC BLOOD PRESSURE: 81 MMHG | SYSTOLIC BLOOD PRESSURE: 120 MMHG | TEMPERATURE: 98.6 F

## 2022-11-14 DIAGNOSIS — R07.9 CHEST PAIN, UNSPECIFIED TYPE: ICD-10-CM

## 2022-11-14 DIAGNOSIS — R60.0 BILATERAL LOWER EXTREMITY EDEMA: ICD-10-CM

## 2022-11-14 LAB
BASOPHILS # BLD AUTO: 0.1 10E3/UL (ref 0–0.2)
BASOPHILS NFR BLD AUTO: 1 %
D DIMER PPP FEU-MCNC: 0.38 UG/ML FEU (ref 0–0.5)
EOSINOPHIL # BLD AUTO: 0.2 10E3/UL (ref 0–0.7)
EOSINOPHIL NFR BLD AUTO: 3 %
ERYTHROCYTE [DISTWIDTH] IN BLOOD BY AUTOMATED COUNT: 13 % (ref 10–15)
HCT VFR BLD AUTO: 38.1 % (ref 35–47)
HGB BLD-MCNC: 12.5 G/DL (ref 11.7–15.7)
IMM GRANULOCYTES # BLD: 0 10E3/UL
IMM GRANULOCYTES NFR BLD: 0 %
LYMPHOCYTES # BLD AUTO: 2.1 10E3/UL (ref 0.8–5.3)
LYMPHOCYTES NFR BLD AUTO: 23 %
MCH RBC QN AUTO: 28.5 PG (ref 26.5–33)
MCHC RBC AUTO-ENTMCNC: 32.8 G/DL (ref 31.5–36.5)
MCV RBC AUTO: 87 FL (ref 78–100)
MONOCYTES # BLD AUTO: 0.6 10E3/UL (ref 0–1.3)
MONOCYTES NFR BLD AUTO: 7 %
NEUTROPHILS # BLD AUTO: 5.8 10E3/UL (ref 1.6–8.3)
NEUTROPHILS NFR BLD AUTO: 66 %
NRBC # BLD AUTO: 0 10E3/UL
NRBC BLD AUTO-RTO: 0 /100
PLATELET # BLD AUTO: 294 10E3/UL (ref 150–450)
RBC # BLD AUTO: 4.39 10E6/UL (ref 3.8–5.2)
WBC # BLD AUTO: 8.8 10E3/UL (ref 4–11)

## 2022-11-14 PROCEDURE — 93005 ELECTROCARDIOGRAM TRACING: CPT | Performed by: EMERGENCY MEDICINE

## 2022-11-14 PROCEDURE — 85025 COMPLETE CBC W/AUTO DIFF WBC: CPT | Performed by: EMERGENCY MEDICINE

## 2022-11-14 PROCEDURE — 71046 X-RAY EXAM CHEST 2 VIEWS: CPT

## 2022-11-14 PROCEDURE — 82435 ASSAY OF BLOOD CHLORIDE: CPT | Performed by: EMERGENCY MEDICINE

## 2022-11-14 PROCEDURE — 93010 ELECTROCARDIOGRAM REPORT: CPT | Mod: 59 | Performed by: EMERGENCY MEDICINE

## 2022-11-14 PROCEDURE — 93970 EXTREMITY STUDY: CPT | Mod: 26 | Performed by: RADIOLOGY

## 2022-11-14 PROCEDURE — 93308 TTE F-UP OR LMTD: CPT | Performed by: EMERGENCY MEDICINE

## 2022-11-14 PROCEDURE — 84484 ASSAY OF TROPONIN QUANT: CPT | Performed by: EMERGENCY MEDICINE

## 2022-11-14 PROCEDURE — 93308 TTE F-UP OR LMTD: CPT | Mod: 26 | Performed by: EMERGENCY MEDICINE

## 2022-11-14 PROCEDURE — 83880 ASSAY OF NATRIURETIC PEPTIDE: CPT | Performed by: EMERGENCY MEDICINE

## 2022-11-14 PROCEDURE — 93970 EXTREMITY STUDY: CPT

## 2022-11-14 PROCEDURE — 85379 FIBRIN DEGRADATION QUANT: CPT | Performed by: EMERGENCY MEDICINE

## 2022-11-14 PROCEDURE — 99285 EMERGENCY DEPT VISIT HI MDM: CPT | Mod: 25 | Performed by: EMERGENCY MEDICINE

## 2022-11-14 PROCEDURE — 71046 X-RAY EXAM CHEST 2 VIEWS: CPT | Mod: 26 | Performed by: RADIOLOGY

## 2022-11-14 PROCEDURE — 36415 COLL VENOUS BLD VENIPUNCTURE: CPT | Performed by: EMERGENCY MEDICINE

## 2022-11-14 ASSESSMENT — ACTIVITIES OF DAILY LIVING (ADL): ADLS_ACUITY_SCORE: 38

## 2022-11-15 LAB
ALBUMIN SERPL BCG-MCNC: 4.2 G/DL (ref 3.5–5.2)
ALP SERPL-CCNC: 78 U/L (ref 35–104)
ALT SERPL W P-5'-P-CCNC: 30 U/L (ref 10–35)
ANION GAP SERPL CALCULATED.3IONS-SCNC: 15 MMOL/L (ref 7–15)
AST SERPL W P-5'-P-CCNC: 41 U/L (ref 10–35)
ATRIAL RATE - MUSE: 110 BPM
BILIRUB SERPL-MCNC: 0.2 MG/DL
BUN SERPL-MCNC: 12.8 MG/DL (ref 6–20)
CALCIUM SERPL-MCNC: 10.1 MG/DL (ref 8.6–10)
CHLORIDE SERPL-SCNC: 107 MMOL/L (ref 98–107)
CREAT SERPL-MCNC: 0.71 MG/DL (ref 0.51–0.95)
DEPRECATED HCO3 PLAS-SCNC: 17 MMOL/L (ref 22–29)
DIASTOLIC BLOOD PRESSURE - MUSE: NORMAL MMHG
GFR SERPL CREATININE-BSD FRML MDRD: >90 ML/MIN/1.73M2
GLUCOSE SERPL-MCNC: 138 MG/DL (ref 70–99)
INTERPRETATION ECG - MUSE: NORMAL
NT-PROBNP SERPL-MCNC: 12 PG/ML (ref 0–450)
P AXIS - MUSE: 23 DEGREES
POTASSIUM SERPL-SCNC: 3.8 MMOL/L (ref 3.4–5.3)
PR INTERVAL - MUSE: 142 MS
PROT SERPL-MCNC: 7.1 G/DL (ref 6.4–8.3)
QRS DURATION - MUSE: 74 MS
QT - MUSE: 320 MS
QTC - MUSE: 433 MS
R AXIS - MUSE: 80 DEGREES
SODIUM SERPL-SCNC: 139 MMOL/L (ref 136–145)
SYSTOLIC BLOOD PRESSURE - MUSE: NORMAL MMHG
T AXIS - MUSE: 4 DEGREES
TROPONIN T SERPL HS-MCNC: 12 NG/L
VENTRICULAR RATE- MUSE: 110 BPM

## 2022-11-15 ASSESSMENT — ENCOUNTER SYMPTOMS
FEVER: 0
SHORTNESS OF BREATH: 1
SORE THROAT: 0
NAUSEA: 0
COUGH: 0
DIARRHEA: 0
VOMITING: 0

## 2022-11-15 ASSESSMENT — ACTIVITIES OF DAILY LIVING (ADL)
ADLS_ACUITY_SCORE: 40
ADLS_ACUITY_SCORE: 40

## 2022-11-15 NOTE — ED PROVIDER NOTES
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 Topics     Alcohol use: No     Alcohol/week: 0.0 standard drinks     Drug use: No      Past medical history, past surgical history, medications, allergies, family history, and social history were reviewed with the patient. No additional pertinent items.       Review of Systems   Constitutional:  Negative for fever.   HENT: Negative for congestion and sore throat.    Respiratory: Positive for shortness of breath. Negative for cough.    Cardiovascular: Positive for chest pain and leg swelling.   Gastrointestinal: Negative for diarrhea, nausea and vomiting.   All other systems reviewed and are negative.    A complete review of systems was performed with pertinent positives and negatives noted in the HPI, and all other systems negative.    Physical Exam   BP: 120/81  Pulse: 107  Temp: 98.6  F (37  C)  Resp: 18  SpO2: 95 %  Physical Exam  Constitutional:       General: She is not in acute distress.     Appearance: She is not diaphoretic.   HENT:      Head: Atraumatic.   Eyes:      General: No scleral icterus.     Pupils: Pupils are equal, round, and reactive to light.   Cardiovascular:      Heart sounds: Normal heart sounds.   Pulmonary:      Effort: No respiratory distress.      Breath sounds: Normal breath sounds.   Abdominal:      Palpations: Abdomen is soft.      Tenderness: There is no abdominal tenderness.   Musculoskeletal:         General: No tenderness.      Right lower leg: Edema (mild) present.      Left lower leg: Edema (mild) present.   Skin:     General: Skin is warm.         ED Course      Procedures  Results for orders placed during the hospital encounter of 11/14/22    POC US ECHO LIMITED    Impression  Limited Bedside ED Cardiac Ultrasound  PROCEDURE: PERFORMED BY: Dr. Donavon Young MD  INDICATIONS/SYMPTOM:  Chest Pain  PROBE: Cardiac phased array probe  BODY LOCATION: Chest (cardiac)  FINDINGS: The ultrasound was performed utilizing the subcostal, parasternal long axis, parasternal short axis and apical 4 chamber views.  Images somewhat limited by difficult visualization due to habitus.  Grossly normal LV contractility. No RV dilation visualized. No pericardial effusion visualized.  INTERPRETATION:    Grossly normal LV contractility. No pericardial effusion. No RV dilation.  IMAGE  DOCUMENTATION: Images were archived to PACs system.            EKG Interpretation:      Interpreted by Oliva Cabello MD  Time reviewed: 0000  Symptoms at time of EKG: left sided chest pain   Rhythm: sinus tachycardia  Rate:    Axis: Normal  Ectopy: none  Conduction: normal  ST Segments/ T Waves: No ST-T wave changes  Q Waves: none  Comparison to prior: Unchanged    Clinical Impression: stable EKG                          Results for orders placed or performed during the hospital encounter of 11/14/22   POC US ECHO LIMITED     Status: None    Impression    Limited Bedside ED Cardiac Ultrasound  PROCEDURE: PERFORMED BY: Dr. Donavon Young MD  INDICATIONS/SYMPTOM:  Chest Pain  PROBE: Cardiac phased array probe  BODY LOCATION: Chest (cardiac)  FINDINGS: The ultrasound was performed utilizing the subcostal, parasternal long axis, parasternal short axis and apical 4 chamber views.  Images somewhat limited by difficult visualization due to habitus.  Grossly normal LV contractility. No RV dilation visualized. No pericardial effusion visualized.   INTERPRETATION:    Grossly normal LV contractility. No pericardial effusion. No RV dilation.   IMAGE DOCUMENTATION: Images were archived to PACs system.     Chest XR,  PA & LAT     Status: None    Narrative    EXAM: XR CHEST 2 VIEWS  LOCATION: [facility]  DATE/TIME: 11/14/2022 11:30 PM    INDICATION: left chest pain  COMPARISON: 11/03/2022      Impression    IMPRESSION: Heart size is normal. Mild elevation of the right hemidiaphragm. Clear lungs. No visible pneumothorax or pleural effusion. Mild lower thoracic spine scoliotic curvature.   US Lower Extremity Venous Duplex Bilateral     Status: None (Preliminary result)    Impression    RESIDENT PRELIMINARY INTERPRETATION  IMPRESSION: No evidence of deep venous thrombosis in either lower  extremity.   Troponin T, High Sensitivity     Status: Normal   Result Value Ref Range     Troponin T, High Sensitivity 12 <=14 ng/L   Comprehensive metabolic panel     Status: Abnormal   Result Value Ref Range    Sodium 139 136 - 145 mmol/L    Potassium 3.8 3.4 - 5.3 mmol/L    Chloride 107 98 - 107 mmol/L    Carbon Dioxide (CO2) 17 (L) 22 - 29 mmol/L    Anion Gap 15 7 - 15 mmol/L    Urea Nitrogen 12.8 6.0 - 20.0 mg/dL    Creatinine 0.71 0.51 - 0.95 mg/dL    Calcium 10.1 (H) 8.6 - 10.0 mg/dL    Glucose 138 (H) 70 - 99 mg/dL    Alkaline Phosphatase 78 35 - 104 U/L    AST 41 (H) 10 - 35 U/L    ALT 30 10 - 35 U/L    Protein Total 7.1 6.4 - 8.3 g/dL    Albumin 4.2 3.5 - 5.2 g/dL    Bilirubin Total 0.2 <=1.2 mg/dL    GFR Estimate >90 >60 mL/min/1.73m2   BNP     Status: Normal   Result Value Ref Range    N terminal Pro BNP Inpatient 12 0 - 450 pg/mL   D dimer quantitative     Status: Normal   Result Value Ref Range    D-Dimer Quantitative 0.38 0.00 - 0.50 ug/mL FEU    Narrative    This D-dimer assay is intended for use in conjunction with a clinical pretest probability assessment model to exclude pulmonary embolism (PE) and deep venous thrombosis (DVT) in outpatients suspected of PE or DVT. The cut-off value is 0.50 ug/mL FEU.   CBC with platelets and differential     Status: None   Result Value Ref Range    WBC Count 8.8 4.0 - 11.0 10e3/uL    RBC Count 4.39 3.80 - 5.20 10e6/uL    Hemoglobin 12.5 11.7 - 15.7 g/dL    Hematocrit 38.1 35.0 - 47.0 %    MCV 87 78 - 100 fL    MCH 28.5 26.5 - 33.0 pg    MCHC 32.8 31.5 - 36.5 g/dL    RDW 13.0 10.0 - 15.0 %    Platelet Count 294 150 - 450 10e3/uL    % Neutrophils 66 %    % Lymphocytes 23 %    % Monocytes 7 %    % Eosinophils 3 %    % Basophils 1 %    % Immature Granulocytes 0 %    NRBCs per 100 WBC 0 <1 /100    Absolute Neutrophils 5.8 1.6 - 8.3 10e3/uL    Absolute Lymphocytes 2.1 0.8 - 5.3 10e3/uL    Absolute Monocytes 0.6 0.0 - 1.3 10e3/uL    Absolute Eosinophils 0.2 0.0 - 0.7 10e3/uL    Absolute Basophils 0.1 0.0 - 0.2 10e3/uL    Absolute Immature Granulocytes 0.0  <=0.4 10e3/uL    Absolute NRBCs 0.0 10e3/uL   EKG 12-lead, tracing only     Status: None (Preliminary result)   Result Value Ref Range    Systolic Blood Pressure  mmHg    Diastolic Blood Pressure  mmHg    Ventricular Rate 110 BPM    Atrial Rate 110 BPM    HI Interval 142 ms    QRS Duration 74 ms     ms    QTc 433 ms    P Axis 23 degrees    R AXIS 80 degrees    T Axis 4 degrees    Interpretation ECG Sinus tachycardia  Otherwise normal ECG      CBC with platelets differential     Status: None    Narrative    The following orders were created for panel order CBC with platelets differential.  Procedure                               Abnormality         Status                     ---------                               -----------         ------                     CBC with platelets and d...[[id number]]                      Final result                 Please view results for these tests on the individual orders.     Medications - No data to display     Assessments & Plan (with Medical Decision Making)   Nevin Alvarado is a 32 year old female who was initially seen in the triage by the triage physician and labs and imaging were ordered.  EKG looks stable compared with previous.  Basic labs were checked and troponin is negative.  Given that she has had chest pain for greater than 6 hours I do think this effectively rules out acute coronary syndrome.  BNP is normal at 12.  D-dimer is negative.  CBC is not remarkable.  Chemistry showed mildly low bicarb and minimally elevated calcium, of unlikely significance. Bilateral lower extremity ultrasounds are negative for DVT.  Chest x-ray does not show any acute abnormality.  Bedside echo done in triage shows no concerning acute findings.  She does have some lower extremity edema.  She is chronically on a multitude of medications, and I do think that its more appropriate for her underlying primary care provider to make decisions on further management.  I am not finding  acute/serious or any life-threatening etiology at this time.  I do not think she has dissection or PE, acute coronary syndrome.  No evidence for pneumonia or pneumothorax.  I do think she can safely be discharged home and encouraged to follow-up within the next week.  She may return with any worsening or concerns.      This part of the document was transcribed by Josselin Hill, Medical Scribe.      I have reviewed the nursing notes. I have reviewed the findings, diagnosis, plan and need for follow up with the patient.    Discharge Medication List as of 11/15/2022 12:35 AM          Final diagnoses:   Bilateral lower extremity edema   Chest pain, unspecified type       --  Oliva Cabello MD  [facility] EMERGENCY DEPARTMENT  11/14/2022     Oliva Cabello MD  11/15/22 0315

## 2022-11-15 NOTE — ED NOTES
ED Triage Provider Note  [facility] EMERGENCY DEPARTMENT  Encounter Date: Nov 14, 2022    History:  Chief Complaint   Patient presents with     Chest Pain     Nevin Alvarado is a 32 year old female with PMH notable for borderline personality disorder, frequent ED presentations for swallowed foreign bodies, asthma, PE associated with surgery who presents to the ED with chest pain and leg swelling.  Symptoms started 3 days ago.  Legs have become more swollen than usual, has had some swelling previously.  Feet are swollen which is unusual for her.  Patient also notes left superior anterior chest pain.  It has been coming going through the course of the day.  It has been lasting less than an hour each time, but 1.5 hours before calling EMS tonight.  Associated shortness of breath.  No recent cough nor fever.  No recent swallowed foreign bodies.    Review of Systems:  Positive chest pain, positive shortness of breath    Exam:  /81   Pulse 107   Temp 98.6  F (37  C) (Oral)   Resp 18   LMP 10/07/2022 (Approximate)   SpO2 95%   General: No acute distress. Appears stated age.   Cardio: Mildly tachycardic rate, extremities well perfused, 1-2+ symmetric lower extremity edema  Resp: Normal work of breathing, grossly normal respiratory rate  Neuro: Alert. Facial movement grossly symmetric. Grossly intact strength.       Medical Decision Making:  Patient arriving to the ED with problem as above. A medical screening exam was performed. Initial differential diagnosis includes but not limited to Dependent edema, CHF, musculoskeletal pain, ACS, PE, DVT.    EKG, US, serum labs, chest x-ray orders initiated from Triage.  EKG showed mildly tachycardic sinus rhythm without evidence of acute ischemia.  Bedside cardiac ultrasound showed grossly normal LV contractility, no pericardial effusion, no RV dilation.    The patient is most appropriate to return to the waiting room.            EKG Interpretation:       Interpreted by Donavon Young MD  Time reviewed:2009   Symptoms at time of EKG: chest discomfort    Rhythm: Normal sinus  and Sinus tachycardia  Rate: 110  Axis: Normal  Ectopy: None  Conduction: Normal  ST Segments/ T Waves: No acute ischemic changes and chronic T inversion in III  Q Waves: Chronic inferior tiny Q waves  Comparison to prior: Unchanged from 8/31/2022, 8/28/2022    Clinical Impression: no acute changes          Donavon Young MD  11/14/2022 at 10:16 PM           Donavon Young MD  11/14/22 [id number]

## 2022-11-15 NOTE — ED TRIAGE NOTES
Pt BIBA with c/o chest pain. Pt states she began having chest pain this evening, unrelieved by Asprin &  Nitroglycerine. Pt also endorsing BLE swelling & cyanosis. Pt A&Ox4, VSS on RA, pain 8/10.      Triage Assessment     Row Name 11/14/22 9877       Triage Assessment (Adult)    Airway WDL WDL       Respiratory WDL    Respiratory WDL WDL       Skin Circulation/Temperature WDL    Skin Circulation/Temperature WDL X;circulation    Skin Circulation cyanosis       Cardiac WDL    Cardiac WDL X;chest pain       Chest Pain Assessment    Chest Pain Location midsternal    Character aching    Precipitating Factors at rest    Associated Signs/Symptoms cyanosis       Peripheral/Neurovascular WDL    Peripheral Neurovascular WDL X;neurovascular assessment lower       LLE Neurovascular Assessment    Temperature LLE cool    Color LLE pale;cyanotic    Sensation LLE no tingling;no tenderness;no numbness       RLE Neurovascular Assessment    Temperature RLE cool    Color RLE cyanotic;pale    Sensation RLE no tingling;no tenderness;no numbness       Cognitive/Neuro/Behavioral WDL    Cognitive/Neuro/Behavioral WDL WDL       [place] Coma Scale    Best Eye Response 4-->(E4) spontaneous    Best Motor Response 6-->(M6) obeys commands    Best Verbal Response 5-->(V5) oriented    [place] Coma Scale Score 15

## 2022-11-29 ENCOUNTER — ANESTHESIA (OUTPATIENT)
Dept: SURGERY | Facility: CLINIC | Age: 32
End: 2022-11-29
Payer: COMMERCIAL

## 2022-11-29 ENCOUNTER — HOSPITAL ENCOUNTER (OUTPATIENT)
Facility: CLINIC | Age: 32
Discharge: HOME | End: 2022-11-30
Attending: EMERGENCY MEDICINE | Admitting: INTERNAL MEDICINE
Payer: COMMERCIAL

## 2022-11-29 ENCOUNTER — APPOINTMENT (OUTPATIENT)
Dept: CT IMAGING | Facility: CLINIC | Age: 32
End: 2022-11-29
Attending: EMERGENCY MEDICINE
Payer: COMMERCIAL

## 2022-11-29 ENCOUNTER — ANESTHESIA EVENT (OUTPATIENT)
Dept: SURGERY | Facility: CLINIC | Age: 32
End: 2022-11-29
Payer: COMMERCIAL

## 2022-11-29 DIAGNOSIS — F50.89 PICA: ICD-10-CM

## 2022-11-29 DIAGNOSIS — T18.9XXA FOREIGN BODY IN DIGESTIVE TRACT, INITIAL ENCOUNTER: ICD-10-CM

## 2022-11-29 LAB
ANION GAP SERPL CALCULATED.3IONS-SCNC: 12 MMOL/L (ref 5–18)
BUN SERPL-MCNC: 11 MG/DL (ref 8–22)
CALCIUM SERPL-MCNC: 9.9 MG/DL (ref 8.5–10.5)
CHLORIDE BLD-SCNC: 103 MMOL/L (ref 98–107)
CO2 SERPL-SCNC: 26 MMOL/L (ref 22–31)
CREAT SERPL-MCNC: 0.72 MG/DL (ref 0.6–1.1)
ERYTHROCYTE [DISTWIDTH] IN BLOOD BY AUTOMATED COUNT: 13.2 % (ref 10–15)
GFR SERPL CREATININE-BSD FRML MDRD: >90 ML/MIN/1.73M2
GLUCOSE BLD-MCNC: 142 MG/DL (ref 70–125)
HCT VFR BLD AUTO: 40.2 % (ref 35–47)
HGB BLD-MCNC: 13 G/DL (ref 11.7–15.7)
MCH RBC QN AUTO: 29 PG (ref 26.5–33)
MCHC RBC AUTO-ENTMCNC: 32.3 G/DL (ref 31.5–36.5)
MCV RBC AUTO: 90 FL (ref 78–100)
PLATELET # BLD AUTO: 286 10E3/UL (ref 150–450)
POTASSIUM BLD-SCNC: 3.6 MMOL/L (ref 3.5–5)
RBC # BLD AUTO: 4.48 10E6/UL (ref 3.8–5.2)
SARS-COV-2 RNA RESP QL NAA+PROBE: NEGATIVE
SODIUM SERPL-SCNC: 141 MMOL/L (ref 136–145)
WBC # BLD AUTO: 9.4 10E3/UL (ref 4–11)

## 2022-11-29 PROCEDURE — 999N000141 HC STATISTIC PRE-PROCEDURE NURSING ASSESSMENT: Performed by: INTERNAL MEDICINE

## 2022-11-29 PROCEDURE — 258N000003 HC RX IP 258 OP 636: Performed by: NURSE ANESTHETIST, CERTIFIED REGISTERED

## 2022-11-29 PROCEDURE — 82310 ASSAY OF CALCIUM: CPT | Performed by: EMERGENCY MEDICINE

## 2022-11-29 PROCEDURE — 710N000010 HC RECOVERY PHASE 1, LEVEL 2, PER MIN: Performed by: INTERNAL MEDICINE

## 2022-11-29 PROCEDURE — 96374 THER/PROPH/DIAG INJ IV PUSH: CPT | Mod: 59

## 2022-11-29 PROCEDURE — 370N000017 HC ANESTHESIA TECHNICAL FEE, PER MIN: Performed by: INTERNAL MEDICINE

## 2022-11-29 PROCEDURE — 96375 TX/PRO/DX INJ NEW DRUG ADDON: CPT

## 2022-11-29 PROCEDURE — C9803 HOPD COVID-19 SPEC COLLECT: HCPCS

## 2022-11-29 PROCEDURE — 250N000025 HC SEVOFLURANE, PER MIN: Performed by: INTERNAL MEDICINE

## 2022-11-29 PROCEDURE — 85027 COMPLETE CBC AUTOMATED: CPT | Performed by: EMERGENCY MEDICINE

## 2022-11-29 PROCEDURE — 250N000011 HC RX IP 250 OP 636: Performed by: EMERGENCY MEDICINE

## 2022-11-29 PROCEDURE — 360N000075 HC SURGERY LEVEL 2, PER MIN: Performed by: INTERNAL MEDICINE

## 2022-11-29 PROCEDURE — 710N000012 HC RECOVERY PHASE 2, PER MINUTE: Performed by: INTERNAL MEDICINE

## 2022-11-29 PROCEDURE — 272N000001 HC OR GENERAL SUPPLY STERILE: Performed by: INTERNAL MEDICINE

## 2022-11-29 PROCEDURE — 36415 COLL VENOUS BLD VENIPUNCTURE: CPT | Performed by: EMERGENCY MEDICINE

## 2022-11-29 PROCEDURE — 250N000011 HC RX IP 250 OP 636: Performed by: NURSE ANESTHETIST, CERTIFIED REGISTERED

## 2022-11-29 PROCEDURE — 99285 EMERGENCY DEPT VISIT HI MDM: CPT | Mod: 25

## 2022-11-29 PROCEDURE — U0005 INFEC AGEN DETEC AMPLI PROBE: HCPCS | Performed by: EMERGENCY MEDICINE

## 2022-11-29 PROCEDURE — 74177 CT ABD & PELVIS W/CONTRAST: CPT

## 2022-11-29 RX ORDER — IOPAMIDOL 755 MG/ML
100 INJECTION, SOLUTION INTRAVASCULAR ONCE
Status: COMPLETED | OUTPATIENT
Start: 2022-11-29 | End: 2022-11-29

## 2022-11-29 RX ORDER — ONDANSETRON 2 MG/ML
4 INJECTION INTRAMUSCULAR; INTRAVENOUS ONCE
Status: COMPLETED | OUTPATIENT
Start: 2022-11-29 | End: 2022-11-29

## 2022-11-29 RX ORDER — SODIUM CHLORIDE, SODIUM LACTATE, POTASSIUM CHLORIDE, CALCIUM CHLORIDE 600; 310; 30; 20 MG/100ML; MG/100ML; MG/100ML; MG/100ML
INJECTION, SOLUTION INTRAVENOUS CONTINUOUS
Status: DISCONTINUED | OUTPATIENT
Start: 2022-11-30 | End: 2022-11-30 | Stop reason: HOSPADM

## 2022-11-29 RX ORDER — ONDANSETRON 2 MG/ML
4 INJECTION INTRAMUSCULAR; INTRAVENOUS EVERY 30 MIN PRN
Status: DISCONTINUED | OUTPATIENT
Start: 2022-11-29 | End: 2022-11-30 | Stop reason: HOSPADM

## 2022-11-29 RX ORDER — SODIUM CHLORIDE, SODIUM LACTATE, POTASSIUM CHLORIDE, CALCIUM CHLORIDE 600; 310; 30; 20 MG/100ML; MG/100ML; MG/100ML; MG/100ML
INJECTION, SOLUTION INTRAVENOUS CONTINUOUS
Status: DISCONTINUED | OUTPATIENT
Start: 2022-11-29 | End: 2022-11-30 | Stop reason: HOSPADM

## 2022-11-29 RX ORDER — ONDANSETRON 4 MG/1
4 TABLET, ORALLY DISINTEGRATING ORAL EVERY 30 MIN PRN
Status: DISCONTINUED | OUTPATIENT
Start: 2022-11-29 | End: 2022-11-30 | Stop reason: HOSPADM

## 2022-11-29 RX ORDER — KETOROLAC TROMETHAMINE 15 MG/ML
15 INJECTION, SOLUTION INTRAMUSCULAR; INTRAVENOUS ONCE
Status: COMPLETED | OUTPATIENT
Start: 2022-11-29 | End: 2022-11-29

## 2022-11-29 RX ORDER — HALOPERIDOL 5 MG/ML
1 INJECTION INTRAMUSCULAR
Status: DISCONTINUED | OUTPATIENT
Start: 2022-11-29 | End: 2022-11-30 | Stop reason: HOSPADM

## 2022-11-29 RX ORDER — LIDOCAINE 40 MG/G
CREAM TOPICAL
Status: DISCONTINUED | OUTPATIENT
Start: 2022-11-29 | End: 2022-11-30 | Stop reason: HOSPADM

## 2022-11-29 RX ADMIN — Medication 100 MG: at 23:52

## 2022-11-29 RX ADMIN — IOPAMIDOL 90 ML: 755 INJECTION, SOLUTION INTRAVENOUS at 22:12

## 2022-11-29 RX ADMIN — KETOROLAC TROMETHAMINE 15 MG: 15 INJECTION, SOLUTION INTRAMUSCULAR; INTRAVENOUS at 21:26

## 2022-11-29 RX ADMIN — PROPOFOL 200 MG: 10 INJECTION, EMULSION INTRAVENOUS at 23:52

## 2022-11-29 RX ADMIN — SODIUM CHLORIDE, POTASSIUM CHLORIDE, SODIUM LACTATE AND CALCIUM CHLORIDE: 600; 310; 30; 20 INJECTION, SOLUTION INTRAVENOUS at 23:49

## 2022-11-29 RX ADMIN — ONDANSETRON 4 MG: 2 INJECTION INTRAMUSCULAR; INTRAVENOUS at 21:21

## 2022-11-29 ASSESSMENT — ENCOUNTER SYMPTOMS
ABDOMINAL PAIN: 1
BACK PAIN: 1

## 2022-11-29 ASSESSMENT — ACTIVITIES OF DAILY LIVING (ADL)
ADLS_ACUITY_SCORE: 44
ADLS_ACUITY_SCORE: 40

## 2022-11-30 VITALS
RESPIRATION RATE: 16 BRPM | OXYGEN SATURATION: 93 % | DIASTOLIC BLOOD PRESSURE: 60 MMHG | SYSTOLIC BLOOD PRESSURE: 133 MMHG | WEIGHT: 293 LBS | BODY MASS INDEX: 48.82 KG/M2 | HEART RATE: 101 BPM | HEIGHT: 65 IN | TEMPERATURE: 98.4 F

## 2022-11-30 LAB — UPPER GI ENDOSCOPY: NORMAL

## 2022-11-30 PROCEDURE — 250N000011 HC RX IP 250 OP 636: Performed by: NURSE ANESTHETIST, CERTIFIED REGISTERED

## 2022-11-30 RX ORDER — NALOXONE HYDROCHLORIDE 0.4 MG/ML
0.4 INJECTION, SOLUTION INTRAMUSCULAR; INTRAVENOUS; SUBCUTANEOUS
Status: DISCONTINUED | OUTPATIENT
Start: 2022-11-30 | End: 2022-11-30 | Stop reason: HOSPADM

## 2022-11-30 RX ORDER — ONDANSETRON 2 MG/ML
INJECTION INTRAMUSCULAR; INTRAVENOUS PRN
Status: DISCONTINUED | OUTPATIENT
Start: 2022-11-30 | End: 2022-11-30

## 2022-11-30 RX ORDER — LORAZEPAM 2 MG/ML
.5-1 INJECTION INTRAMUSCULAR
Status: DISCONTINUED | OUTPATIENT
Start: 2022-11-30 | End: 2022-11-30 | Stop reason: HOSPADM

## 2022-11-30 RX ORDER — PROPOFOL 10 MG/ML
INJECTION, EMULSION INTRAVENOUS PRN
Status: DISCONTINUED | OUTPATIENT
Start: 2022-11-29 | End: 2022-11-30

## 2022-11-30 RX ORDER — DEXAMETHASONE SODIUM PHOSPHATE 10 MG/ML
INJECTION, SOLUTION INTRAMUSCULAR; INTRAVENOUS PRN
Status: DISCONTINUED | OUTPATIENT
Start: 2022-11-30 | End: 2022-11-30

## 2022-11-30 RX ORDER — NALOXONE HYDROCHLORIDE 0.4 MG/ML
0.2 INJECTION, SOLUTION INTRAMUSCULAR; INTRAVENOUS; SUBCUTANEOUS
Status: DISCONTINUED | OUTPATIENT
Start: 2022-11-30 | End: 2022-11-30 | Stop reason: HOSPADM

## 2022-11-30 RX ORDER — SODIUM CHLORIDE, SODIUM LACTATE, POTASSIUM CHLORIDE, CALCIUM CHLORIDE 600; 310; 30; 20 MG/100ML; MG/100ML; MG/100ML; MG/100ML
INJECTION, SOLUTION INTRAVENOUS CONTINUOUS PRN
Status: DISCONTINUED | OUTPATIENT
Start: 2022-11-29 | End: 2022-11-30

## 2022-11-30 RX ORDER — FLUMAZENIL 0.1 MG/ML
0.2 INJECTION, SOLUTION INTRAVENOUS
Status: DISCONTINUED | OUTPATIENT
Start: 2022-11-30 | End: 2022-11-30 | Stop reason: HOSPADM

## 2022-11-30 RX ADMIN — ONDANSETRON 4 MG: 2 INJECTION INTRAMUSCULAR; INTRAVENOUS at 00:00

## 2022-11-30 RX ADMIN — DEXAMETHASONE SODIUM PHOSPHATE 10 MG: 10 INJECTION, SOLUTION INTRAMUSCULAR; INTRAVENOUS at 00:00

## 2022-11-30 RX ADMIN — PROPOFOL 100 MG: 10 INJECTION, EMULSION INTRAVENOUS at 00:00

## 2022-11-30 ASSESSMENT — ACTIVITIES OF DAILY LIVING (ADL): ADLS_ACUITY_SCORE: 44

## 2022-11-30 NOTE — ANESTHESIA PREPROCEDURE EVALUATION
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 Intubation/Ventilatory Support Discussed: No.      - Patient is DNR/DNI Status: No         Postoperative Care       PONV prophylaxis: Dexamethasone or Solumedrol, Ondansetron (or other 5HT-3)     Comments:    Other Comments: Decadron, Zofran.  Diprivan infusion.  Anectine.                Erich Gomez MD

## 2022-11-30 NOTE — ED TRIAGE NOTES
Here by [facility]  [facility] called EMS because patient voiced concerns about having a dream to overdose self, according to patient, she doesn't want to hurt self, however to distract herself from these thoughts she swallowed a metal bread tie   Took zyprexa at 1700  Denies SI/HI thoughts   Positive for nausea, airway patent, no signs of respiratory distress   Alert and oriented     Triage Assessment       Row Name 11/29/22 2006       Triage Assessment (Adult)    Airway WDL WDL       Respiratory WDL    Respiratory WDL WDL       Skin Circulation/Temperature WDL    Skin Circulation/Temperature WDL WDL       Cardiac WDL    Cardiac WDL WDL       Peripheral/Neurovascular WDL    Peripheral Neurovascular WDL WDL       Cognitive/Neuro/Behavioral WDL    Cognitive/Neuro/Behavioral WDL WDL

## 2022-11-30 NOTE — CONSULTS
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 membranes.   LYMPH: No cervical lymphadenopathy  PULM: Nonlabored breathing. Breath sounds equal.   CARDIO: Regular rate  GI: Non-distended. Soft.  Non-tender to palpation.  No guarding. No rebound tenderness.  EXT: warm, no lower extremity edema  NEURO: Alert. No focal defects.   PSYCH: Mental status appropriate, mood and affect  normal.    SKIN: No rashes  MSK: No joint abnormalities    LABORATORY DATA:  CBC RESULTS:   Recent Labs   Lab Test 11/29/22  2103 11/14/22  2220 10/23/22  1800   WBC 9.4 8.8 8.2   RBC 4.48 4.39 4.23   HGB 13.0 12.5 12.3   HCT 40.2 38.1 37.2   MCV 90 87 88   MCH 29.0 28.5 29.1   MCHC 32.3 32.8 33.1   RDW 13.2 13.0 13.2    294 290        CMP Results:   Recent Labs   Lab Test 11/29/22  2132 11/14/22  2220 10/23/22  1800 10/15/22  2319 10/12/22  0944 10/08/22  2013    139 138 143 140 137   POTASSIUM 3.6 3.8 3.7 3.8 4.4 3.6   CHLORIDE 103 107 101 105 103 100   CO2 26 17* 26 26 26 26   ANIONGAP 12 15 11 12 11 11   * 138* 125 122 125* 121*   BUN 11 12.8 12 10 13.3 11.7   CR 0.72 0.71 0.66 0.70 0.52 0.61   BILITOTAL  --  0.2  --  0.2 0.2 <0.2   ALKPHOS  --  78  --  65 66 78   ALT  --  30  --  40 36* 35   AST  --  41*  --  36  --  34        INR Results:   Recent Labs   Lab Test 10/15/22  2319 08/23/22  2018 07/29/22  1043   INR 1.03 1.00 1.00          IMAGING:      FB in stomach                 Cristino Kelsey MD  Thank you for the opportunity to participate in the care of this patient.   Please feel free to call me with any questions or concerns.  Phone number [phone].            CC: [facility] Brittany A

## 2022-11-30 NOTE — PROGRESS NOTES
PRE-PROCEDURE NOTE      REASON FOR PROCEDURE: Foreign body ingestion    History and Physical: Reviewed, no changes    Pre-sedation Assessment:     VS: VSS  General: Alert, NAD  Airway: normal  Heart: RRR  Lungs: CTA    Previous Reaction to Sedation: None    Sedation Plan Based on Assessment: General Anesthesia    Mallampati: II    ASA Classification: 4      Impression: Patient deemed adequate candidate for General Anesthesia    Plan: esophagogastroduodenoscopy              Cristino Kelsey MD  Thank you for the opportunity to participate in the care of this patient.   Please feel free to call me with any questions or concerns.  Phone number [phone].            11/29/2022 11:30 PM

## 2022-11-30 NOTE — ANESTHESIA POSTPROCEDURE EVALUATION
Patient: Nevin Alvarado    Procedure: Procedure(s):  ESOPHAGOGASTRODUODENOSCOPY (EGD)       Anesthesia Type:  General    Note:  Disposition: Outpatient   Postop Pain Control: Uneventful            Sign Out: Well controlled pain   PONV: No   Neuro/Psych: Uneventful            Sign Out: Acceptable/Baseline neuro status   Airway/Respiratory: Uneventful            Sign Out: Acceptable/Baseline resp. status   CV/Hemodynamics: Uneventful            Sign Out: Acceptable CV status; No obvious hypovolemia; No obvious fluid overload   Other NRE: NONE   DID A NON-ROUTINE EVENT OCCUR? No           Last vitals:  Vitals Value Taken Time   /85 11/30/22 0045   Temp 36.8  C (98.3  F) 11/30/22 0045   Pulse 106 11/30/22 0045   Resp 19 11/30/22 0045   SpO2 95 % 11/30/22 0045       Electronically Signed By: Erich Gomez MD  November 30, 2022  4:57 AM

## 2022-11-30 NOTE — ED PROVIDER NOTES
EMERGENCY DEPARTMENT SIGN OUT NOTE        ED COURSE AND MEDICAL DECISION MAKING  Patient was signed out to me by Dr Shane Singh at 1053    In brief, Nevin Alvarado is a 32 year old female who initially presented with swallowed wire tie for bread     At time of sign out, pending EGD with GI and likely discharge.     Went to the OR with GI    ED Course as of 11/29/22 2253 Tue Nov 29, 2022 2036 I met with the patient for an interview and initial exam. Plans for care were discussed.   2047 Patient is a 32-year-old woman with history of pica, here with abdominal and left flank pain after ingesting a metal bread clip 2 hours ago.  She did this to alleviate stress from recent dreams last night.  She had stressful thoughts about planning suicide if no she does not want to, distress let her to eat this bread clip.  She has a benign abdomen, but has had prior foreign bodies perforated in the past and I ordered a CT scan, Toradol, Zofran.   2227 I am able to visualize a metallic foreign body,   2237 Patient is asking for analgesia.  She was given Toradol, Zofran here.  There is no evidence of perforation on the scan, and I think is appropriate to give opioids at this time.  GI has been paged.   2252 CT Abdomen Pelvis w Contrast  1.  There is a thin wire-like linear density within the stomach with length of approximately 7 cm.  2.  No evidence for bowel perforation or bowel wall thickening.         FINAL IMPRESSION    1. Pica    2. Foreign body in digestive tract, initial encounter        ED MEDS  Medications   ketorolac (TORADOL) injection 15 mg (15 mg Intravenous Given 11/29/22 2126)   ondansetron (ZOFRAN) injection 4 mg (4 mg Intravenous Given 11/29/22 2121)   iopamidol (ISOVUE-370) solution 100 mL (90 mLs Intravenous Given 11/29/22 2212)       LAB  Labs Ordered and Resulted from Time of ED Arrival to Time of ED Departure   BASIC METABOLIC PANEL - Abnormal       Result Value    Sodium 141      Potassium 3.6       Chloride 103      Carbon Dioxide (CO2) 26      Anion Gap 12      Urea Nitrogen 11      Creatinine 0.72      Calcium 9.9      Glucose 142 (*)     GFR Estimate >90     CBC WITH PLATELETS - Normal    WBC Count 9.4      RBC Count 4.48      Hemoglobin 13.0      Hematocrit 40.2      MCV 90      MCH 29.0      MCHC 32.3      RDW 13.2      Platelet Count 286             RADIOLOGY    CT Abdomen Pelvis w Contrast   Final Result   IMPRESSION:    1.  There is a thin wire-like linear density within the stomach with length of approximately 7 cm.   2.  No evidence for bowel perforation or bowel wall thickening.          DISCHARGE MEDS  New Prescriptions    No medications on file         Zay Casanova MD  Emergency Medicine  [facility] EMERGENCY ROOM  [facility] [facility] [id number] [phone]       Zay Casanova MD  11/29/22 7925       Zay Casanova MD  11/29/22 1974

## 2022-11-30 NOTE — ED PROVIDER NOTES
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 HUT Noted: [id number]     Hydrocodone-Acetaminophen Nausea and Vomiting and Rash     Update on 12/12  Pt says she can take tylenol just not the hydrocodone.   Other reaction(s): Rash       Latex Rash     HUT Reaction: Rash; HUT Reaction Type: Allergy; HUT Severity: Low; HUT Noted: [id number]  Other reaction(s):  Rash       Oseltamivir Hives     med stopped, PN: med stopped  med stopped, PN: med stopped; HUT Comment: med stopped, PN: med stopped; HUT Reaction: Hives; HUT Reaction Type: Allergy; HUT Severity: Med; HUT Noted: [id number]     Penicillins Anaphylaxis     HUT Reaction: Anaphylaxis; HUT Reaction Type: Allergy; HUT Severity: High; HUT Noted: [id number]     Vancomycin Itching, Swelling and Rash     Other reaction(s): Redness  Flushed face, very itchy; HUT Comment: Flushed face, very itchy; HUT Reaction: Angioedema; HUT Reaction: Redness; HUT Severity: Med; HUT Noted: [id number]    facial     Hydrocodone Nausea and Vomiting and GI Disturbance     vomiting for days, PN: vomiting for days; HUT Comment: vomiting for days; HUT Reaction: Gastrointestinal; HUT Reaction: Nausea And Vomiting; HUT Reaction Type: Intolerance; HUT Severity: Med; HUT Noted: [id number]  vomiting for days       Blood-Group Specific Substance Other (See Comments)     Patient has an anti-Cw and non-specific antibodies. Blood product orders may be delayed. Draw one red top and two purple top tubes for all type/screen/crossmatch orders.  Patient has anti-Cw, anti-K ([place]), Warm auto and nonspecific antibodies. Blood products may be delayed. Draw patient 24 hours prior to transfusion. Draw one red top and two purple top tubes for all type and screen orders.     Clavulanic Acid Angioedema     Fentanyl Itching     Naltrexone Other (See Comments)     Reaction(s): Vivid dreams.     Other Drug Allergy (See Comments)      See original file MRN [id number]. Files are marked for merge     Oxycodone Swelling     Adhesive Tape Rash     Silicone type     Band-Aid Anti-Itch      Other reaction(s): adhesive allergy     Cephalosporins Rash     Lamotrigine Rash     Possibly associated with Lamictal.   HUT Comment: Possibly associated with Lamictal. ; HUT Reaction: Rash; HUT Reaction Type: Allergy; HUT Severity: Low; HUT Noted: [id number]       FAMILY HISTORY:  Family History  "  Problem Relation Age of Onset     Diabetes Type 2  Maternal Grandmother      Diabetes Type 2  Paternal Grandmother      Breast Cancer Paternal Grandmother      Cerebrovascular Disease Father 53     Myocardial Infarction No family hx of      Coronary Artery Disease Early Onset No family hx of      Ovarian Cancer No family hx of      Colon Cancer No family hx of      Depression Mother      Anxiety Disorder Mother        SOCIAL HISTORY:   Social History     Socioeconomic History     Marital status: Single   Occupational History     Occupation: On disability   Tobacco Use     Smoking status: Never     Smokeless tobacco: Never   Substance and Sexual Activity     Alcohol use: No     Alcohol/week: 0.0 standard drinks     Drug use: No     Sexual activity: Not Currently     Partners: Male     Birth control/protection: I.U.D.     Comment: IUD - Mirena - placed July, 2015   Social History Narrative    Single.    Living in independent living portion of [employer] [facility].    On disability.    No regular exercise.        VITALS:  BP (!) 172/99   Pulse 106   Temp 98.8  F (37.1  C) (Oral)   Resp 22   Ht 1.651 m (5' 5\")   Wt 136.5 kg (301 lb)   LMP 10/07/2022 (Approximate)   SpO2 96%   BMI 50.09 kg/m      PHYSICAL EXAM    Constitutional: Well developed, well nourished. Comfortable appearing.  HENT: Normocephalic, atraumatic, mucous membranes moist, nose normal. Neck- Supple, gross ROM intact.   Eyes: Pupils mid-range, conjunctiva without injection, no discharge.   Respiratory: Clear to auscultation bilaterally, no respiratory distress, no wheezing, speaks full sentences easily. No cough.  Cardiovascular: Normal heart rate, regular rhythm, no murmurs.   GI: Soft, no tenderness to deep palpation in all quadrants, no masses.  Musculoskeletal: Moving all 4 extremities intentionally and without pain. No obvious deformity.  Skin: Warm, dry, no rash.  Neurologic: Alert & oriented x 3, cranial nerves grossly " intact.  Psychiatric: Affect normal, cooperative.       LAB:  All pertinent labs reviewed and interpreted.  Labs Ordered and Resulted from Time of ED Arrival to Time of ED Departure   BASIC METABOLIC PANEL - Abnormal       Result Value    Sodium 141      Potassium 3.6      Chloride 103      Carbon Dioxide (CO2) 26      Anion Gap 12      Urea Nitrogen 11      Creatinine 0.72      Calcium 9.9      Glucose 142 (*)     GFR Estimate >90     CBC WITH PLATELETS - Normal    WBC Count 9.4      RBC Count 4.48      Hemoglobin 13.0      Hematocrit 40.2      MCV 90      MCH 29.0      MCHC 32.3      RDW 13.2      Platelet Count 286         RADIOLOGY:  Reviewed all pertinent imaging. Please see official radiology report.  CT Abdomen Pelvis w Contrast   Final Result   IMPRESSION:    1.  There is a thin wire-like linear density within the stomach with length of approximately 7 cm.   2.  No evidence for bowel perforation or bowel wall thickening.          EKG:    All EKG interpretations will be found in ED course above.      I, Quinten Lundberg am serving as a scribe to document services personally performed by Dr. Jonh Singh based on my observation and the provider's statements to me. I, Jonh Singh MD attest that Quinten Lundberg is acting in a scribe capacity, has observed my performance of the services and has documented them in accordance with my direction.    Jonh Singh M.D.  Emergency Medicine  [facility] EMERGENCY ROOM  [address] [facility] [id number] [phone]  Dept: [phone]       Jonh Singh MD  11/29/22 [id number]

## 2022-11-30 NOTE — ANESTHESIA CARE TRANSFER NOTE
Patient: Nevin Alvarado    Procedure: Procedure(s):  ESOPHAGOGASTRODUODENOSCOPY (EGD)       Diagnosis: Foreign body in digestive tract, initial encounter [T18.9XXA]  Diagnosis Additional Information: No value filed.    Anesthesia Type:   General     Note:    Oropharynx: oropharynx clear of all foreign objects and spontaneously breathing  Level of Consciousness: awake  Oxygen Supplementation: room air    Independent Airway: airway patency satisfactory and stable  Dentition: dentition unchanged  Vital Signs Stable: post-procedure vital signs reviewed and stable  Report to RN Given: handoff report given  Patient transferred to: PACU    Handoff Report: Identifed the Patient, Identified the Reponsible Provider, Reviewed the pertinent medical history, Discussed the surgical course, Reviewed Intra-OP anesthesia mangement and issues during anesthesia, Set expectations for post-procedure period and Allowed opportunity for questions and acknowledgement of understanding      Vitals:  Vitals Value Taken Time   /81 11/30/22 0021   Temp 36.9  C (98.4  F) 11/30/22 0021   Pulse 112    Resp 20 11/30/22 0021   SpO2 97 % 11/30/22 0021       Electronically Signed By: HU GONZALEZ CRNA  November 30, 2022  12:23 AM

## 2022-11-30 NOTE — ANESTHESIA PROCEDURE NOTES
Airway       Patient location during procedure: OR       Procedure Start/Stop Times: 11/29/2022 11:54 PM  Staff -        CRNA: Sandra Portillo APRN CRNA       Performed By: CRNA  Consent for Airway        Urgency: elective  Indications and Patient Condition       Indications for airway management: isma-procedural       Induction type:intravenous       Mask difficulty assessment: 0 - not attempted    Final Airway Details       Final airway type: endotracheal airway       Successful airway: ETT - single  Endotracheal Airway Details        ETT size (mm): 7.5       Cuffed: yes       Successful intubation technique: direct laryngoscopy       DL Blade Type: MAC 3       Grade View of Cords: 1       Adjucts: stylet       Position: Right       Measured from: gums/teeth    Post intubation assessment        Placement verified by: capnometry, equal breath sounds and chest rise        Number of attempts at approach: 1       Number of other approaches attempted: 0       Secured with: silk tape       Ease of procedure: easy       Dentition: Intact       Dental guard used and removed.    Medication(s) Administered   Medication Administration Time: 11/29/2022 11:54 PM

## 2022-12-07 ENCOUNTER — APPOINTMENT (OUTPATIENT)
Dept: RADIOLOGY | Facility: CLINIC | Age: 32
End: 2022-12-07
Attending: EMERGENCY MEDICINE
Payer: COMMERCIAL

## 2022-12-07 ENCOUNTER — HOSPITAL ENCOUNTER (OUTPATIENT)
Facility: CLINIC | Age: 32
Setting detail: OBSERVATION
Discharge: NURSING, REHAB OR LONG-TERM CARE | End: 2022-12-08
Attending: EMERGENCY MEDICINE | Admitting: EMERGENCY MEDICINE
Payer: COMMERCIAL

## 2022-12-07 DIAGNOSIS — T18.9XXA SWALLOWED FOREIGN BODY, INITIAL ENCOUNTER: ICD-10-CM

## 2022-12-07 LAB — SARS-COV-2 RNA RESP QL NAA+PROBE: NEGATIVE

## 2022-12-07 PROCEDURE — 250N000013 HC RX MED GY IP 250 OP 250 PS 637: Performed by: HOSPITALIST

## 2022-12-07 PROCEDURE — 99285 EMERGENCY DEPT VISIT HI MDM: CPT

## 2022-12-07 PROCEDURE — C9803 HOPD COVID-19 SPEC COLLECT: HCPCS

## 2022-12-07 PROCEDURE — 74019 RADEX ABDOMEN 2 VIEWS: CPT

## 2022-12-07 PROCEDURE — U0003 INFECTIOUS AGENT DETECTION BY NUCLEIC ACID (DNA OR RNA); SEVERE ACUTE RESPIRATORY SYNDROME CORONAVIRUS 2 (SARS-COV-2) (CORONAVIRUS DISEASE [COVID-19]), AMPLIFIED PROBE TECHNIQUE, MAKING USE OF HIGH THROUGHPUT TECHNOLOGIES AS DESCRIBED BY CMS-2020-01-R: HCPCS | Performed by: EMERGENCY MEDICINE

## 2022-12-07 PROCEDURE — 99219 PR INITIAL OBSERVATION CARE,LEVEL II: CPT | Performed by: HOSPITALIST

## 2022-12-07 PROCEDURE — G0378 HOSPITAL OBSERVATION PER HR: HCPCS

## 2022-12-07 RX ORDER — ONDANSETRON 4 MG/1
4 TABLET, ORALLY DISINTEGRATING ORAL EVERY 6 HOURS PRN
Status: DISCONTINUED | OUTPATIENT
Start: 2022-12-07 | End: 2022-12-08 | Stop reason: HOSPADM

## 2022-12-07 RX ORDER — ONDANSETRON 2 MG/ML
4 INJECTION INTRAMUSCULAR; INTRAVENOUS EVERY 6 HOURS PRN
Status: DISCONTINUED | OUTPATIENT
Start: 2022-12-07 | End: 2022-12-08 | Stop reason: HOSPADM

## 2022-12-07 RX ORDER — LANOLIN ALCOHOL/MO/W.PET/CERES
3 CREAM (GRAM) TOPICAL
Status: DISCONTINUED | OUTPATIENT
Start: 2022-12-07 | End: 2022-12-08 | Stop reason: HOSPADM

## 2022-12-07 RX ORDER — ACETAMINOPHEN 325 MG/1
975 TABLET ORAL EVERY 8 HOURS PRN
Status: DISCONTINUED | OUTPATIENT
Start: 2022-12-07 | End: 2022-12-08 | Stop reason: HOSPADM

## 2022-12-07 RX ADMIN — ACETAMINOPHEN 975 MG: 325 TABLET ORAL at 23:29

## 2022-12-07 ASSESSMENT — ACTIVITIES OF DAILY LIVING (ADL)
ADLS_ACUITY_SCORE: 40
ADLS_ACUITY_SCORE: 40

## 2022-12-08 ENCOUNTER — APPOINTMENT (OUTPATIENT)
Dept: RADIOLOGY | Facility: CLINIC | Age: 32
End: 2022-12-08
Attending: HOSPITALIST
Payer: COMMERCIAL

## 2022-12-08 ENCOUNTER — ANESTHESIA EVENT (OUTPATIENT)
Dept: SURGERY | Facility: CLINIC | Age: 32
End: 2022-12-08
Payer: COMMERCIAL

## 2022-12-08 ENCOUNTER — ANESTHESIA (OUTPATIENT)
Dept: SURGERY | Facility: CLINIC | Age: 32
End: 2022-12-08
Payer: COMMERCIAL

## 2022-12-08 VITALS
OXYGEN SATURATION: 94 % | RESPIRATION RATE: 16 BRPM | DIASTOLIC BLOOD PRESSURE: 80 MMHG | SYSTOLIC BLOOD PRESSURE: 157 MMHG | HEART RATE: 98 BPM | TEMPERATURE: 97.7 F

## 2022-12-08 LAB
GLUCOSE BLDC GLUCOMTR-MCNC: 111 MG/DL (ref 70–99)
GLUCOSE BLDC GLUCOMTR-MCNC: 136 MG/DL (ref 70–99)
UPPER GI ENDOSCOPY: NORMAL

## 2022-12-08 PROCEDURE — G0378 HOSPITAL OBSERVATION PER HR: HCPCS

## 2022-12-08 PROCEDURE — 710N000012 HC RECOVERY PHASE 2, PER MINUTE: Performed by: INTERNAL MEDICINE

## 2022-12-08 PROCEDURE — 360N000075 HC SURGERY LEVEL 2, PER MIN: Performed by: INTERNAL MEDICINE

## 2022-12-08 PROCEDURE — 999N000141 HC STATISTIC PRE-PROCEDURE NURSING ASSESSMENT: Performed by: INTERNAL MEDICINE

## 2022-12-08 PROCEDURE — 82962 GLUCOSE BLOOD TEST: CPT

## 2022-12-08 PROCEDURE — 250N000011 HC RX IP 250 OP 636: Performed by: NURSE ANESTHETIST, CERTIFIED REGISTERED

## 2022-12-08 PROCEDURE — 272N000001 HC OR GENERAL SUPPLY STERILE: Performed by: INTERNAL MEDICINE

## 2022-12-08 PROCEDURE — 250N000011 HC RX IP 250 OP 636: Performed by: FAMILY MEDICINE

## 2022-12-08 PROCEDURE — 99217 PR OBSERVATION CARE DISCHARGE: CPT | Performed by: FAMILY MEDICINE

## 2022-12-08 PROCEDURE — 96374 THER/PROPH/DIAG INJ IV PUSH: CPT | Mod: 59

## 2022-12-08 PROCEDURE — 250N000013 HC RX MED GY IP 250 OP 250 PS 637: Performed by: HOSPITALIST

## 2022-12-08 PROCEDURE — 250N000013 HC RX MED GY IP 250 OP 250 PS 637: Performed by: FAMILY MEDICINE

## 2022-12-08 PROCEDURE — 74019 RADEX ABDOMEN 2 VIEWS: CPT

## 2022-12-08 PROCEDURE — 250N000009 HC RX 250: Performed by: NURSE ANESTHETIST, CERTIFIED REGISTERED

## 2022-12-08 PROCEDURE — 370N000017 HC ANESTHESIA TECHNICAL FEE, PER MIN: Performed by: INTERNAL MEDICINE

## 2022-12-08 RX ORDER — ONDANSETRON 4 MG/1
4 TABLET, ORALLY DISINTEGRATING ORAL EVERY 8 HOURS PRN
Status: DISCONTINUED | OUTPATIENT
Start: 2022-12-08 | End: 2022-12-08 | Stop reason: HOSPADM

## 2022-12-08 RX ORDER — ALBUTEROL SULFATE 90 UG/1
2 AEROSOL, METERED RESPIRATORY (INHALATION) EVERY 6 HOURS PRN
Status: DISCONTINUED | OUTPATIENT
Start: 2022-12-08 | End: 2022-12-08 | Stop reason: HOSPADM

## 2022-12-08 RX ORDER — HYDROXYCHLOROQUINE SULFATE 200 MG/1
200 TABLET, FILM COATED ORAL DAILY
Status: DISCONTINUED | OUTPATIENT
Start: 2022-12-08 | End: 2022-12-08 | Stop reason: HOSPADM

## 2022-12-08 RX ORDER — PROPOFOL 10 MG/ML
INJECTION, EMULSION INTRAVENOUS PRN
Status: DISCONTINUED | OUTPATIENT
Start: 2022-12-08 | End: 2022-12-08

## 2022-12-08 RX ORDER — ALBUTEROL SULFATE 0.83 MG/ML
2.5 SOLUTION RESPIRATORY (INHALATION) EVERY 6 HOURS PRN
Status: DISCONTINUED | OUTPATIENT
Start: 2022-12-08 | End: 2022-12-08 | Stop reason: HOSPADM

## 2022-12-08 RX ORDER — DESVENLAFAXINE 50 MG/1
100 TABLET, FILM COATED, EXTENDED RELEASE ORAL DAILY
Status: DISCONTINUED | OUTPATIENT
Start: 2022-12-08 | End: 2022-12-08 | Stop reason: HOSPADM

## 2022-12-08 RX ORDER — KETOROLAC TROMETHAMINE 15 MG/ML
30 INJECTION, SOLUTION INTRAMUSCULAR; INTRAVENOUS EVERY 6 HOURS PRN
Status: DISCONTINUED | OUTPATIENT
Start: 2022-12-08 | End: 2022-12-08 | Stop reason: HOSPADM

## 2022-12-08 RX ORDER — FUROSEMIDE 20 MG
20 TABLET ORAL DAILY
Status: DISCONTINUED | OUTPATIENT
Start: 2022-12-08 | End: 2022-12-08 | Stop reason: HOSPADM

## 2022-12-08 RX ORDER — LIDOCAINE HYDROCHLORIDE 10 MG/ML
INJECTION, SOLUTION INFILTRATION; PERINEURAL PRN
Status: DISCONTINUED | OUTPATIENT
Start: 2022-12-08 | End: 2022-12-08

## 2022-12-08 RX ORDER — FERROUS SULFATE 325(65) MG
325 TABLET ORAL
Status: DISCONTINUED | OUTPATIENT
Start: 2022-12-08 | End: 2022-12-08 | Stop reason: HOSPADM

## 2022-12-08 RX ORDER — METFORMIN HCL 500 MG
1000 TABLET, EXTENDED RELEASE 24 HR ORAL
Status: DISCONTINUED | OUTPATIENT
Start: 2022-12-08 | End: 2022-12-08 | Stop reason: HOSPADM

## 2022-12-08 RX ORDER — CETIRIZINE HYDROCHLORIDE 10 MG/1
10 TABLET ORAL AT BEDTIME
Status: DISCONTINUED | OUTPATIENT
Start: 2022-12-08 | End: 2022-12-08 | Stop reason: HOSPADM

## 2022-12-08 RX ORDER — MEDROXYPROGESTERONE ACETATE 5 MG
10 TABLET ORAL DAILY
Status: DISCONTINUED | OUTPATIENT
Start: 2022-12-08 | End: 2022-12-08 | Stop reason: HOSPADM

## 2022-12-08 RX ORDER — PREGABALIN 100 MG/1
100 CAPSULE ORAL 3 TIMES DAILY
Status: DISCONTINUED | OUTPATIENT
Start: 2022-12-08 | End: 2022-12-08 | Stop reason: HOSPADM

## 2022-12-08 RX ORDER — BUSPIRONE HYDROCHLORIDE 10 MG/1
20 TABLET ORAL 3 TIMES DAILY
Status: DISCONTINUED | OUTPATIENT
Start: 2022-12-08 | End: 2022-12-08 | Stop reason: HOSPADM

## 2022-12-08 RX ORDER — OLANZAPINE 2.5 MG/1
2.5 TABLET, FILM COATED ORAL
Status: DISCONTINUED | OUTPATIENT
Start: 2022-12-08 | End: 2022-12-08 | Stop reason: HOSPADM

## 2022-12-08 RX ORDER — DEXAMETHASONE SODIUM PHOSPHATE 10 MG/ML
INJECTION, SOLUTION INTRAMUSCULAR; INTRAVENOUS PRN
Status: DISCONTINUED | OUTPATIENT
Start: 2022-12-08 | End: 2022-12-08

## 2022-12-08 RX ORDER — ONDANSETRON 2 MG/ML
INJECTION INTRAMUSCULAR; INTRAVENOUS PRN
Status: DISCONTINUED | OUTPATIENT
Start: 2022-12-08 | End: 2022-12-08

## 2022-12-08 RX ORDER — PROPOFOL 10 MG/ML
INJECTION, EMULSION INTRAVENOUS CONTINUOUS PRN
Status: DISCONTINUED | OUTPATIENT
Start: 2022-12-08 | End: 2022-12-08

## 2022-12-08 RX ORDER — MONTELUKAST SODIUM 10 MG/1
10 TABLET ORAL EVERY EVENING
Status: DISCONTINUED | OUTPATIENT
Start: 2022-12-08 | End: 2022-12-08 | Stop reason: HOSPADM

## 2022-12-08 RX ADMIN — PROPOFOL 200 MCG/KG/MIN: 10 INJECTION, EMULSION INTRAVENOUS at 13:21

## 2022-12-08 RX ADMIN — ACETAMINOPHEN 975 MG: 325 TABLET ORAL at 10:23

## 2022-12-08 RX ADMIN — PREGABALIN 100 MG: 100 CAPSULE ORAL at 09:59

## 2022-12-08 RX ADMIN — PROPOFOL 50 MG: 10 INJECTION, EMULSION INTRAVENOUS at 13:21

## 2022-12-08 RX ADMIN — ONDANSETRON 4 MG: 2 INJECTION INTRAMUSCULAR; INTRAVENOUS at 13:27

## 2022-12-08 RX ADMIN — DEXAMETHASONE SODIUM PHOSPHATE 4 MG: 10 INJECTION, SOLUTION INTRAMUSCULAR; INTRAVENOUS at 13:27

## 2022-12-08 RX ADMIN — BUSPIRONE HYDROCHLORIDE 20 MG: 10 TABLET ORAL at 10:23

## 2022-12-08 RX ADMIN — LIDOCAINE HYDROCHLORIDE 40 MG: 10 INJECTION, SOLUTION INFILTRATION; PERINEURAL at 13:21

## 2022-12-08 RX ADMIN — DESVENLAFAXINE 100 MG: 50 TABLET, FILM COATED, EXTENDED RELEASE ORAL at 10:00

## 2022-12-08 RX ADMIN — KETOROLAC TROMETHAMINE 30 MG: 15 INJECTION, SOLUTION INTRAMUSCULAR; INTRAVENOUS at 10:23

## 2022-12-08 ASSESSMENT — ACTIVITIES OF DAILY LIVING (ADL)
ADLS_ACUITY_SCORE: 40

## 2022-12-08 NOTE — PROGRESS NOTES
Spoke with Marcos from [facility] about transport home. Informed him that ambulance transport would be required to get her back to the group [place]. He said he could arrange for an on-coming staff member, Chrissy, to come directly here to receive discharge instructions and transport patient back to group home.Marcos estimated that Chrissy would be to [facility] around [phone].

## 2022-12-08 NOTE — DISCHARGE SUMMARY
"[facility] MEDICINE  DISCHARGE SUMMARY     Primary Care Physician: Latonya Knight  Admission Date: 12/7/2022   Discharge Provider: Erich Ortega MD Discharge Date: 12/8/2022   Diet:   Active Diet and Nourishment Order   Procedures     Regular Diet Adult     Diet     Code Status: Full Code   Activity: DCACTIVITY: Activity as tolerated  Resume previous group home orders      Condition at Discharge: Stable     REASON FOR PRESENTATION(See Admission Note for Details)   Foreign body ingestion    PRINCIPAL & ACTIVE DISCHARGE DIAGNOSES     Principal Problem:    Foreign body ingestion  Active Problems:    Borderline personality disorder (H)    Self-injurious behavior    Morbid obesity with BMI of 40.0-44.9, adult (H)  Obstructive sleep apnea on CPAP    Clinically Significant Risk Factors Present on Admission                       # Severe Obesity: Estimated body mass index is 50.09 kg/m  as calculated from the following:    Height as of 11/29/22: 1.651 m (5' 5\").    Weight as of 11/29/22: 136.5 kg (301 lb).           PENDING LABS     Unresulted Labs Ordered in the Past 30 Days of this Admission     No orders found for last 31 day(s).        PROCEDURES ( this hospitalization only)    Procedure(s):  ESOPHAGOGASTRODUODENOSCOPY (EGD) with foreign body removal      RECOMMENDATIONS TO OUTPATIENT PROVIDER FOR F/U VISIT   Follow-up Appointments     Follow-up and recommended labs and tests      Follow-up with primary care provider within 1 week.               DISPOSITION     Group home    SUMMARY OF HOSPITAL COURSE:      Nevin Alvarado is a 32 year old female who presented with complaints of abdominal pain after swallowing foreign body.  Medical history is notable for ADD, depression, self-harm, suicide attempt, pulmonary embolism, morbid obesity, recurrent foreign body ingestion.    Initial evaluation revealed elevated blood pressure, elevated heart rate.  COVID-negative.  Abdominal " x-ray showed a wire in the abdomen.    Initial treatment included close monitoring.  GI was consulted via phone who recommended a repeat x-ray of the abdomen in the morning which showed wire still within the stomach.  Upper endoscopy was performed and twisted high wire was removed.  Old scarring was present but no other acute findings.  Afterwards patient felt well.  Interested in eating food.  Okayed to resume regular diet.    Status discussed with patient's guardian Janie Martell and is agreeable to returning to group home.  Resume all previous care orders.      Discharge Medications with Med changes:     Current Discharge Medication List      CONTINUE these medications which have NOT CHANGED    Details   acetaminophen (TYLENOL) 325 MG tablet Take 2 tablets (650 mg) by mouth every 8 hours as needed for mild pain  Qty: 10 tablet, Refills: 0    Associated Diagnoses: Swallowed foreign body, initial encounter      albuterol (PROAIR HFA/PROVENTIL HFA/VENTOLIN HFA) 108 (90 Base) MCG/ACT inhaler Inhale 2 puffs into the lungs every 6 hours as needed for shortness of breath / dyspnea or wheezing  Qty: 18 g, Refills: 0    Comments: Pharmacy may dispense brand covered by insurance (Proair, or proventil or ventolin or generic albuterol inhaler)      albuterol (PROVENTIL) (2.5 MG/3ML) 0.083% neb solution Take 2.5 mg by nebulization every 6 hours as needed for shortness of breath / dyspnea or wheezing      brexpiprazole (REXULTI) 0.5 MG tablet Take 2 mg by mouth daily .      busPIRone (BUSPAR) 10 MG tablet Take 2 tablets (20 mg) by mouth 3 times daily  Qty: 180 tablet, Refills: 0    Associated Diagnoses: Anxiety disorder, unspecified type      cetirizine (ZYRTEC) 10 MG tablet Take 1 tablet (10 mg) by mouth daily  Qty: 30 tablet, Refills: 0    Associated Diagnoses: Pica in adults; Gastroesophageal reflux disease without esophagitis      Cholecalciferol (VITAMIN D) 50 MCG (2000 UT) CAPS Take 2,000 Units by mouth daily  Qty:  30 capsule, Refills: 0    Associated Diagnoses: Other specified health status      desvenlafaxine (PRISTIQ) 100 MG 24 hr tablet Take 1 tablet (100 mg) by mouth daily  Qty: 30 tablet, Refills: 0    Associated Diagnoses: Major depressive disorder, recurrent episode, moderate (H)      ferrous sulfate (FEROSUL) 325 (65 Fe) MG tablet Take 1 tablet (325 mg) by mouth daily (with breakfast)  Qty: 30 tablet, Refills: 0    Associated Diagnoses: Red blood cell antibody positive      furosemide (LASIX) 20 MG tablet Take 20 mg by mouth daily      hydroxychloroquine (PLAQUENIL) 200 MG tablet Take 1 tablet (200 mg) by mouth 2 times daily  Qty: 30 tablet, Refills: 0    Associated Diagnoses: Other specified health status      ibuprofen (ADVIL/MOTRIN) 600 MG tablet Take 1 tablet (600 mg) by mouth every 8 hours as needed for moderate pain  Qty: 24 tablet, Refills: 0      medroxyPROGESTERone (PROVERA) 10 MG tablet Take 1 tablet (10 mg) by mouth daily  Qty: 10 tablet, Refills: 0      metFORMIN (GLUCOPHAGE XR) 500 MG 24 hr tablet Take 1,000 mg by mouth daily (with dinner) Take at 5 pm.      OLANZapine (ZYPREXA) 2.5 MG tablet Take 2.5 mg by mouth daily (with dinner)      ondansetron (ZOFRAN-ODT) 4 MG ODT tab Take 1 tablet (4 mg) by mouth every 8 hours as needed for nausea  Qty: 15 tablet, Refills: 0    Associated Diagnoses: Gastroesophageal reflux disease without esophagitis      pregabalin (LYRICA) 100 MG capsule Take 1 capsule (100 mg) by mouth 3 times daily  Qty: 90 capsule, Refills: 0    Associated Diagnoses: Anxiety; Polyneuropathy      SUMAtriptan (IMITREX) 25 MG tablet Take 25 mg by mouth as needed      valACYclovir (VALTREX) 1000 mg tablet Take 2 tablets by mouth two times daily for one day. Use as needed at onset of cold sore.       alum & mag hydroxide-simethicone (MAALOX MAX) 400-400-40 MG/5ML SUSP suspension Take 15 mLs by mouth every 6 hours as needed for heartburn or other (sore throat)  Qty: 355 mL, Refills: 0       montelukast (SINGULAIR) 10 MG tablet Take 10 mg by mouth every evening      Respiratory Therapy Supplies (NEBULIZER) BRENDAN Nebulizer device.  Albuterol nebulization every 2 hours as needed for shortness of breath or wheezing.  Qty: 1 each, Refills: 0    Comments: Include tubing and mask for age please.         STOP taking these medications       lidocaine, viscous, (XYLOCAINE) 2 % solution Comments:   Reason for Stopping:                 Rationale for medication changes:      No changes to previous orders.  Patient reported she was no longer utilizing viscous lidocaine so this was stopped.      Consults     GASTROENTEROLOGY IP CONSULT      Immunizations given this encounter     Most Recent Immunizations   Administered Date(s) Administered     COVID-19 Vaccine 12+ (Pfizer) 09/29/2021     DTAP (<7y) 04/07/1997     FLU 6-35 months 09/16/2010     Flu, Unspecified 09/26/2017     HPV 12/03/2007     HPV Quadrivalent 12/03/2007     Hep B, Peds or Adolescent 11/05/1996     HepB, Unspecified 11/05/1996     Hepatitis B Immunity: Titer 04/20/2015     Historic Hib Hib-titer 02/03/1993     Historical DTP/aP 04/06/1993     Influenza (IIV3) PF 09/26/2017     Influenza Vaccine >6 months (Alfuria,Fluzone) 09/29/2021     MMR 12/11/2003     OPV, trivalent, live 04/07/1997     OPV, unspecified 04/07/1997     Pneumococcal 23 valent 09/29/2021     TDAP Vaccine (Adacel) 04/16/2015     TDAP Vaccine (Boostrix) 04/16/2015     Td (Adult), Adsorbed 12/11/2003     Twinrix A/B 04/16/2015     Varicella 08/17/1995           Anticoagulation Information      Recent INR results: No results for input(s): INR in the last 168 hours.  Warfarin doses (if applicable) or name of other anticoagulant: na      SIGNIFICANT IMAGING FINDINGS     Results for orders placed or performed during the hospital encounter of 12/07/22   Abdomen XR, 2 vw, flat and upright    Impression    IMPRESSION: A wire is seen in the central abdomen, slightly more inferior and medial  compared to previous. Nonobstructed bowel gas pattern.   XR Abdomen 2 Views    Impression    IMPRESSION: A metallic wire object folded upon itself in the left upper quadrant in the expected location of stomach. This has a different configuration than the study from 12/07/2022. Cholecystectomy clips right upper quadrant. Nonobstructive bowel gas   pattern with no free air or gross organomegaly.       SIGNIFICANT LABORATORY FINDINGS     Most Recent 3 CBC's:Recent Labs   Lab Test 11/29/22  2103 11/14/22  2220 10/23/22  1800   WBC 9.4 8.8 8.2   HGB 13.0 12.5 12.3   MCV 90 87 88    294 290     Most Recent 3 BMP's:Recent Labs   Lab Test 12/08/22  1224 12/08/22  1024 11/29/22  2132 11/14/22  2220 10/23/22  1800   NA  --   --  141 139 138   POTASSIUM  --   --  3.6 3.8 3.7   CHLORIDE  --   --  103 107 101   CO2  --   --  26 17* 26   BUN  --   --  11 12.8 12   CR  --   --  0.72 0.71 0.66   ANIONGAP  --   --  12 15 11   KELBY  --   --  9.9 10.1* 9.6   * 136* 142* 138* 125     Most Recent 2 LFT's:Recent Labs   Lab Test 11/14/22  2220 10/15/22  2319   AST 41* 36   ALT 30 40   ALKPHOS 78 65   BILITOTAL 0.2 0.2     Most Recent 3 INR's:Recent Labs   Lab Test 10/15/22  2319 08/23/22  2018 07/29/22  1043   INR 1.03 1.00 1.00     7-Day Micro Results     Collected Updated Procedure Result Status      12/07/2022 2122 12/07/2022 2158 Asymptomatic COVID-19 Virus (Coronavirus) by PCR Nasopharyngeal [[id number]]    Swab from Nasopharyngeal    Final result Component Value   SARS CoV2 PCR Negative   NEGATIVE: SARS-CoV-2 (COVID-19) RNA not detected, presumed negative.                    Discharge Orders        Reason for your hospital stay    Ingestion of foreign body with upper endoscopy and removal     Follow-up and recommended labs and tests    Follow-up with primary care provider within 1 week.     Activity    Your activity upon discharge: activity as tolerated.  Resume previous group home orders.     Discharge Instructions     Resume all previous group home care plans and orders.     Brief Discharge Instructions    Utilize home CPAP     Diet    Follow this diet upon discharge: Orders Placed This Encounter      Regular Diet Adult       Examination   Physical Exam   Temp:  [97.1  F (36.2  C)-97.7  F (36.5  C)] 97.7  F (36.5  C)  Pulse:  [] 98  Resp:  [16-26] 16  BP: (126-162)/(73-91) 126/85  SpO2:  [95 %-99 %] 95 %  Wt Readings from Last 4 Encounters:   11/29/22 136.5 kg (301 lb)   11/08/22 136.5 kg (301 lb)   10/24/22 137.4 kg (303 lb)   10/15/22 136.5 kg (301 lb)       Constitutional: awake, alert, cooperative, no apparent distress, and appears stated age and morbidly obese  Eyes: Lids and lashes normal, pupils equal, round and reactive to light, extra ocular muscles intact, sclera clear, conjunctiva normal  ENT: Normocephalic, without obvious abnormality, atraumatic, sinuses nontender on palpation, external ears without lesions, oral pharynx with moist mucous membranes, tonsils without erythema or exudates, gums normal and good dentition.  Respiratory: No increased work of breathing, good air exchange, clear to auscultation bilaterally, no crackles or wheezing  Cardiovascular: Normal apical impulse, regular rate and rhythm, normal S1 and S2, no S3 or S4, and no murmur noted  GI: No scars, normal bowel sounds, soft, non-distended, non-tender, no masses palpated, no hepatosplenomegally  Skin: normal skin color, texture, turgor, no redness, warmth, or swelling, and no rashes  Musculoskeletal: There is no redness, warmth, or swelling of the joints.  Full range of motion noted.  Motor strength is 5 out of 5 all extremities bilaterally.  Tone is normal. no lower extremity pitting edema present  Neurologic: Cranial nerves II-XII are grossly intact. Sensory:  Sensory intact  Neuropsychiatric: General: normal, calm and normal eye contact Level of consciousness: alert / normal Affect: flat Orientation: oriented to self, place, time and  situation Memory and insight: normal, memory for past and recent events intact and thought process normal      Please see EMR for more detailed significant labs, imaging, consultant notes etc.    I, Erich Ortega MD, personally saw the patient today and spent less than or equal to 30 minutes discharging this patient.    Erich Ortega MD  [facility]    CC:Latonya Knight

## 2022-12-08 NOTE — ED PROVIDER NOTES
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 cortisone injection the same day as she started the Augmentin.; HUT Comment: PN: facial swelling, left side. Also had cortisone injection the same day as she started the Augmentin.Noted in downtime recovery of chart.; HUT Reaction: Rash; HUT Reaction: Unknown;  HUT Reaction Type: Allergy; HUT Severity: Med; HUT Noted: [id number]     Hydrocodone-Acetaminophen Nausea and Vomiting and Rash     Update on 12/12  Pt says she can take tylenol just not the hydrocodone.   Other reaction(s): Rash       Latex Rash     HUT Reaction: Rash; HUT Reaction Type: Allergy; HUT Severity: Low; HUT Noted: [id number]  Other reaction(s): Rash       Oseltamivir Hives     med stopped, PN: med stopped  med stopped, PN: med stopped; HUT Comment: med stopped, PN: med stopped; HUT Reaction: Hives; HUT Reaction Type: Allergy; HUT Severity: Med; HUT Noted: [id number]     Penicillins Anaphylaxis     HUT Reaction: Anaphylaxis; HUT Reaction Type: Allergy; HUT Severity: High; HUT Noted: [id number]     Vancomycin Itching, Swelling and Rash     Other reaction(s): Redness  Flushed face, very itchy; HUT Comment: Flushed face, very itchy; HUT Reaction: Angioedema; HUT Reaction: Redness; HUT Severity: Med; HUT Noted: [id number]    facial     Hydrocodone Nausea and Vomiting and GI Disturbance     vomiting for days, PN: vomiting for days; HUT Comment: vomiting for days; HUT Reaction: Gastrointestinal; HUT Reaction: Nausea And Vomiting; HUT Reaction Type: Intolerance; HUT Severity: Med; HUT Noted: [id number]  vomiting for days       Blood-Group Specific Substance Other (See Comments)     Patient has an anti-Cw and non-specific antibodies. Blood product orders may be delayed. Draw one red top and two purple top tubes for all type/screen/crossmatch orders.  Patient has anti-Cw, anti-K (Angella), Warm auto and nonspecific antibodies. Blood products may be delayed. Draw patient 24 hours prior to transfusion. Draw one red top and two purple top tubes for all type and screen orders.     Clavulanic Acid Angioedema     Fentanyl Itching     Naltrexone Other (See Comments)     Reaction(s): Vivid dreams.     Other Drug Allergy (See Comments)      See original file MRN [id number]. Files are marked for merge     Oxycodone Swelling     Adhesive  Tape Rash     Silicone type     Band-Aid Anti-Itch      Other reaction(s): adhesive allergy     Cephalosporins Rash     Lamotrigine Rash     Possibly associated with Lamictal.   HUT Comment: Possibly associated with Lamictal. ; HUT Reaction: Rash; HUT Reaction Type: Allergy; HUT Severity: Low; HUT Noted: [id number]       FAMILY HISTORY:  Family History   Problem Relation Age of Onset     Diabetes Type 2  Maternal Grandmother      Diabetes Type 2  Paternal Grandmother      Breast Cancer Paternal Grandmother      Cerebrovascular Disease Father 53     Myocardial Infarction No family hx of      Coronary Artery Disease Early Onset No family hx of      Ovarian Cancer No family hx of      Colon Cancer No family hx of      Depression Mother      Anxiety Disorder Mother        SOCIAL HISTORY:   Social History     Socioeconomic History     Marital status: Single     Spouse name: None     Number of children: None     Years of education: None     Highest education level: None   Occupational History     Occupation: On disability   Tobacco Use     Smoking status: Never     Smokeless tobacco: Never   Substance and Sexual Activity     Alcohol use: No     Alcohol/week: 0.0 standard drinks     Drug use: No     Sexual activity: Not Currently     Partners: Male     Birth control/protection: I.U.D.     Comment: IUD - Mirena - placed July, 2015   Social History Narrative    Single.    Living in independent living portion of [employer].    On disability.    No regular exercise.        VITALS:  BP (!) 162/91   Pulse 118   Resp 26   SpO2 96%     PHYSICAL EXAM    Constitutional: Well developed, Well nourished, NAD.  HENT: Normocephalic, Atraumatic. Neck Supple.  Eyes: EOMI, Conjunctiva normal.  Respiratory: Breathing comfortably on room air. Speaks full sentences easily. Lungs clear to ascultation.  Cardiovascular: Normal heart rate, Regular rhythm. No peripheral edema.  Abdomen: Soft, nontender  Musculoskeletal: Good range of  motion in all major joints. No major deformities noted.  Integument: Warm, Dry.  Neurologic: Alert & awake, Normal motor function, Normal sensory function, No focal deficits noted.   Psychiatric: Cooperative. Affect appropriate.     LAB:  All pertinent labs reviewed and interpreted.  Labs Ordered and Resulted from Time of ED Arrival to Time of ED Departure   COVID-19 VIRUS (CORONAVIRUS) BY PCR - Normal       Result Value    SARS CoV2 PCR Negative         RADIOLOGY:  Reviewed all pertinent imaging. Please see official radiology report.  Abdomen XR, 2 vw, flat and upright   Final Result   IMPRESSION: A wire is seen in the central abdomen, slightly more inferior and medial compared to previous. Nonobstructed bowel gas pattern.          PROCEDURES:   None      I, Sudhir Simeon, am serving as a scribe to document services personally performed by Dr. Jeffy Bhardwaj, based on my observation and the provider's statements to me. IJeffy MD attest that Sudhir Simeon is acting in a scribe capacity, has observed my performance of the services and has documented them in accordance with my direction.    Jeffy Bhardwaj M.D.  Emergency Medicine  [facility] EMERGENCY ROOM  [address] [id number] [phone]  Dept: [phone]     Jeffy Bhardwaj MD  12/07/22 [id number]

## 2022-12-08 NOTE — ANESTHESIA CARE TRANSFER NOTE
Patient: Nevin Alvaraod    Procedure: Procedure(s):  ESOPHAGOGASTRODUODENOSCOPY (EGD) with foreign body removal       Diagnosis: Swallowed foreign body, initial encounter [T18.9XXA]  Diagnosis Additional Information: No value filed.    Anesthesia Type:   MAC     Note:    Oropharynx: oropharynx clear of all foreign objects and spontaneously breathing  Level of Consciousness: awake  Oxygen Supplementation: room air    Independent Airway: airway patency satisfactory and stable  Dentition: dentition unchanged  Vital Signs Stable: post-procedure vital signs reviewed and stable  Report to RN Given: handoff report given  Patient transferred to: Phase II    Handoff Report: Identifed the Patient, Identified the Reponsible Provider, Reviewed the pertinent medical history, Discussed the surgical course, Reviewed Intra-OP anesthesia mangement and issues during anesthesia, Set expectations for post-procedure period and Allowed opportunity for questions and acknowledgement of understanding      Vitals:  Vitals Value Taken Time   BP     Temp     Pulse     Resp     SpO2         Electronically Signed By: HU Beltran CRNA  December 8, 2022  1:34 PM

## 2022-12-08 NOTE — PHARMACY-ADMISSION MEDICATION HISTORY
Pharmacy Note - Admission Medication History    Pertinent Provider Information: Patient reported hydroxychloroquine dose has been reduced to daily based on improvement in skin condition with option to take additional dose as needed.      ______________________________________________________________________    Prior To Admission (PTA) med list completed and updated in EMR.       PTA Med List   Medication Sig Last Dose     acetaminophen (TYLENOL) 325 MG tablet Take 2 tablets (650 mg) by mouth every 8 hours as needed for mild pain (Patient taking differently: Take 1,000 mg by mouth every 8 hours as needed for mild pain) Past Month     albuterol (PROAIR HFA/PROVENTIL HFA/VENTOLIN HFA) 108 (90 Base) MCG/ACT inhaler Inhale 2 puffs into the lungs every 6 hours as needed for shortness of breath / dyspnea or wheezing Past Month     albuterol (PROVENTIL) (2.5 MG/3ML) 0.083% neb solution Take 2.5 mg by nebulization every 6 hours as needed for shortness of breath / dyspnea or wheezing More than a month     brexpiprazole (REXULTI) 0.5 MG tablet Take 2 mg by mouth daily . 12/7/2022     busPIRone (BUSPAR) 10 MG tablet Take 2 tablets (20 mg) by mouth 3 times daily 12/7/2022     cetirizine (ZYRTEC) 10 MG tablet Take 1 tablet (10 mg) by mouth daily (Patient taking differently: Take 10 mg by mouth At Bedtime) 12/6/2022     Cholecalciferol (VITAMIN D) 50 MCG (2000 UT) CAPS Take 2,000 Units by mouth daily 12/7/2022     desvenlafaxine (PRISTIQ) 100 MG 24 hr tablet Take 1 tablet (100 mg) by mouth daily 12/7/2022     ferrous sulfate (FEROSUL) 325 (65 Fe) MG tablet Take 1 tablet (325 mg) by mouth daily (with breakfast) 12/7/2022     furosemide (LASIX) 20 MG tablet Take 20 mg by mouth daily 12/7/2022     hydroxychloroquine (PLAQUENIL) 200 MG tablet Take 1 tablet (200 mg) by mouth 2 times daily (Patient taking differently: Take 200 mg by mouth daily). May take additional 200mg as needed.  12/7/2022     ibuprofen (ADVIL/MOTRIN) 600 MG tablet  Take 1 tablet (600 mg) by mouth every 8 hours as needed for moderate pain Past Week           medroxyPROGESTERone (PROVERA) 10 MG tablet Take 1 tablet (10 mg) by mouth daily 12/7/2022     metFORMIN (GLUCOPHAGE XR) 500 MG 24 hr tablet Take 1,000 mg by mouth daily (with dinner) Take at 5 pm. 12/7/2022     OLANZapine (ZYPREXA) 2.5 MG tablet Take 2.5 mg by mouth daily (with dinner) 12/7/2022     ondansetron (ZOFRAN-ODT) 4 MG ODT tab Take 1 tablet (4 mg) by mouth every 8 hours as needed for nausea Past Month     pregabalin (LYRICA) 100 MG capsule Take 1 capsule (100 mg) by mouth 3 times daily 12/7/2022     SUMAtriptan (IMITREX) 25 MG tablet Take 25 mg by mouth as needed More than a month     valACYclovir (VALTREX) 1000 mg tablet Take 2 tablets by mouth two times daily for one day. Use as needed at onset of cold sore.  More than a month       Information source(s): Patient and [facility]  Method of interview communication: in-person    Summary of Changes to PTA Med List  New: None  Discontinued: HCTZ - per patient, replaced by Lasix, Latuda - replaced by Rexulti  Changed: Hydroxychloroquine from 200mg BID to 200mg daily     Patient was asked about OTC/herbal products specifically.  PTA med list reflects this.    In the past week, patient estimated taking medication this percent of the time:  greater than 90%.    Allergies were reviewed, assessed, and updated with the patient.      Patient did not bring any medications to the hospital and can't retrieve from home. No multi-dose medications are available for use during hospital stay.     The information provided in this note is only as accurate as the sources available at the time of the update(s).    Thank you for the opportunity to participate in the care of this patient.    Margie Lee RPH  12/8/2022 9:20 AM

## 2022-12-08 NOTE — H&P
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  Albuterol nebulization every 2 hours as needed for shortness of breath or wheezing.   SUMAtriptan (IMITREX) 25 MG tablet  Self Yes No   Sig: Take 25 mg by mouth as needed   acetaminophen (TYLENOL) 325 MG tablet   No No   Sig: Take 2 tablets (650 mg) by mouth every 8 hours as needed  for mild pain   albuterol (PROAIR HFA/PROVENTIL HFA/VENTOLIN HFA) 108 (90 Base) MCG/ACT inhaler  Self No No   Sig: Inhale 2 puffs into the lungs every 6 hours as needed for shortness of breath / dyspnea or wheezing   albuterol (PROVENTIL) (2.5 MG/3ML) 0.083% neb solution   Yes No   Sig: Take 2.5 mg by nebulization every 6 hours as needed for shortness of breath / dyspnea or wheezing   alum & mag hydroxide-simethicone (MAALOX MAX) 400-400-40 MG/5ML SUSP suspension   No No   Sig: Take 15 mLs by mouth every 6 hours as needed for heartburn or other (sore throat)   brexpiprazole (REXULTI) 0.5 MG tablet   Yes No   Sig: Take 0.5 mg by mouth daily for 1 week, and reduce Latuda to 20 mg daily for 1 week. After 1 week, increase Rexulti to 1 mg by mouth daily and stop Latuda.   busPIRone (BUSPAR) 10 MG tablet  Self No No   Sig: Take 2 tablets (20 mg) by mouth 3 times daily   busPIRone (BUSPAR) 10 MG tablet   Yes No   Sig: Take 20 mg by mouth   cetirizine (ZYRTEC) 10 MG tablet  Self No No   Sig: Take 1 tablet (10 mg) by mouth daily   Patient taking differently: Take 10 mg by mouth At Bedtime   cholecalciferol 25 MCG (1000 UT) TABS   Yes No   Sig: Take 2,000 Units by mouth   desvenlafaxine (PRISTIQ) 100 MG 24 hr tablet  Self No No   Sig: Take 1 tablet (100 mg) by mouth daily   Patient taking differently: Take 100 mg by mouth every morning   ferrous sulfate (FEROSUL) 325 (65 Fe) MG tablet  Self No No   Sig: Take 1 tablet (325 mg) by mouth daily (with breakfast)   furosemide (LASIX) 20 MG tablet   Yes No   furosemide (LASIX) 20 MG tablet   Yes No   Sig: Take 20 mg by mouth   hydrochlorothiazide (HYDRODIURIL) 25 MG tablet  Self Yes No   Sig: Take 25 mg by mouth daily before breakfast   hydroxychloroquine (PLAQUENIL) 200 MG tablet  Self No No   Sig: Take 1 tablet (200 mg) by mouth 2 times daily   hydroxychloroquine (PLAQUENIL) 200 MG tablet   Yes No   Sig: Take 200 mg by mouth   ibuprofen (ADVIL/MOTRIN) 600 MG tablet  Self Yes No    Sig: Take 600 mg by mouth every 6 hours as needed   ibuprofen (ADVIL/MOTRIN) 600 MG tablet   Yes No   Sig: Take 1 tablet by mouth every 6 hours as needed   ibuprofen (ADVIL/MOTRIN) 600 MG tablet   No No   Sig: Take 1 tablet (600 mg) by mouth every 8 hours as needed for moderate pain   lidocaine, viscous, (XYLOCAINE) 2 % solution   No No   Sig: Swish and swallow 15 mLs in mouth every 6 hours as needed for pain ; Max 8 doses/24 hour period.   lurasidone (LATUDA) 40 MG TABS tablet  Self No No   Sig: Take 1 tablet (40 mg) by mouth daily (with dinner)   medroxyPROGESTERone (PROVERA) 10 MG tablet   No No   Sig: Take 1 tablet (10 mg) by mouth daily   metFORMIN (GLUCOPHAGE XR) 500 MG 24 hr tablet   Yes No   Sig: Take 1,000 mg by mouth daily (with dinner) Take at 5 pm.   montelukast (SINGULAIR) 10 MG tablet  Self Yes No   Sig: Take 10 mg by mouth every evening   ondansetron (ZOFRAN ODT) 4 MG ODT tab   No No   Sig: Take 1 tablet (4 mg) by mouth every 8 hours as needed for nausea   ondansetron (ZOFRAN-ODT) 4 MG ODT tab  Self No No   Sig: Take 1 tablet (4 mg) by mouth every 8 hours as needed for nausea   pregabalin (LYRICA) 100 MG capsule  Self No No   Sig: Take 1 capsule (100 mg) by mouth 3 times daily   sucralfate (CARAFATE) 1 GM tablet   Yes No   valACYclovir (VALTREX) 1000 mg tablet  Self Yes No   Sig: Take 2 tablets by mouth two times daily for one day. Use as needed at onset of cold sore.       Facility-Administered Medications: None      Review of Systems     A 12 point comprehensive review of systems was negative except as noted above in HPI.    PHYSICAL EXAMINATION     Vitals      Pulse:  [110-118] 110  Resp:  [22-26] 22  BP: (153-162)/(73-91) 153/73  SpO2:  [96 %] 96 %    Examination   Physical Exam:    Gen: no acute distress, comfortable  ENT: no scleral icterus  Pulm: Breathing comfortably at rest  GI: abdomen is soft, nondistended, reports pain in left upper abdomen  Derm: Not pale, no jaundice  Psych:  appropriate affect      Pertinent Radiology     Radiology Results:   Recent Results (from the past 24 hour(s))   Abdomen XR, 2 vw, flat and upright    Narrative    EXAM: XR ABDOMEN 2 VIEWS  LOCATION: [facility]  DATE/TIME: 12/7/2022 9:09 PM    INDICATION: Ingested wire foreign body.  COMPARISON: 09/05/2022.      Impression    IMPRESSION: A wire is seen in the central abdomen, slightly more inferior and medial compared to previous. Nonobstructed bowel gas pattern.       Advance Care Planning        Brice Cabrales,   [facility]  [facility]   Phone: #[phone]

## 2022-12-08 NOTE — ANESTHESIA PREPROCEDURE EVALUATION
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     - Discussed with:  Patient      - Extended Intubation/Ventilatory Support Discussed: No.      - Patient is DNR/DNI Status: No    Use of blood products discussed: No .     Postoperative Care       PONV prophylaxis: Dexamethasone or Solumedrol, Ondansetron (or other 5HT-3)     Comments:    Other Comments: Decadron, Zofran.   Diprivan infusion.                Erich Gomez MD

## 2022-12-08 NOTE — ED NOTES
Patient up to the bathroom, states she had BM.  No c/o pain, back from xray. Ice chips given per patient request, removed BLE compression stockings,  Awaiting plan of care.

## 2022-12-08 NOTE — CONSULTS
GI CONSULT NOTE      Name: Nevin Alvarado  Medical Record #: [id number]  YOB: 1990  Date of Admission: 12/7/2022  Date/Time: 12/8/2022/9:25 AM     CHIEF COMPLAINT: Abdominal pain after swallowed metal wire (twist tie).     HISTORY OF PRESENT ILLNESS: We were asked to see Nevin Alvarado by Dr. Ortega for FB ingestion.     Nevin Alvaraod is a 32 year old year old female with history of ADD, depression, self-harm, suicide attempt, pulmonary embolism, morbid obesity, recurrent foreign body ingestion who presented with abdominal pain after swallowing a twist tie from a bread bag containing a metal wire. Imaging today shows this in the stomach. She reports 9/10 LUQ abdominal pain. No nausea or vomiting. She had a normal bowel movement this AM.     REVIEW OF SYSTEMS (ROS): Complete review of systems negative other than listed in HPI.    PAST MEDICAL HISTORY:  Past Medical History:   Diagnosis Date     ADD (attention deficit disorder)      Anorexia nervosa with bulimia     history of; on Topamax     Anxiety      Asthma      Borderline personality disorder (H)      Depression      Eating disorder      H/O self-harm      h/o Suicide attempt 02/21/2018     History of pulmonary embolism 12/2019    Provoked. Completed 3 month course of Apixaban     Morbid obesity      Neuropathy      Obesity      PTSD (post-traumatic stress disorder)      Pulmonary emboli (H)      Rectal foreign body - Recurrent issue, self placed      Self-injurious behavior     hx swallowing nonfood items such as mickie pins     Sleep apnea     uses cpap     Suicidal overdose (H)      Swallowed foreign body - Recurrent issue, self placed      Syncope       FAMILY HISTORY:  Family History   Problem Relation Age of Onset     Diabetes Type 2  Maternal Grandmother      Diabetes Type 2  Paternal Grandmother      Breast Cancer Paternal Grandmother      Cerebrovascular Disease Father 53     Myocardial Infarction No family hx of       Coronary Artery Disease Early Onset No family hx of      Ovarian Cancer No family hx of      Colon Cancer No family hx of      Depression Mother      Anxiety Disorder Mother        SOCIAL HISTORY:  Social History     Socioeconomic History     Marital status: Single     Spouse name: Not on file     Number of children: Not on file     Years of education: Not on file     Highest education level: Not on file   Occupational History     Occupation: On disability   Tobacco Use     Smoking status: Never     Smokeless tobacco: Never   Vaping Use     Vaping Use: Not on file   Substance and Sexual Activity     Alcohol use: No     Alcohol/week: 0.0 standard drinks     Drug use: No     Sexual activity: Not Currently     Partners: Male     Birth control/protection: I.U.D.     Comment: IUD - Mirena - placed July, 2015   Other Topics Concern     Parent/sibling w/ CABG, MI or angioplasty before 65F 55M? Not Asked   Social History Narrative    Single.    Living in independent living portion of [employer].    On disability.    No regular exercise.      Social Determinants of Health     Financial Resource Strain: Not on file   Food Insecurity: Not on file   Transportation Needs: Not on file   Physical Activity: Not on file   Stress: Not on file   Social Connections: Not on file   Intimate Partner Violence: Not on file   Housing Stability: Not on file       MEDICATIONS PRIOR TO ADMISSION: (Not in a hospital admission)         ALLERGIES: Amoxicillin-pot clavulanate, Hydrocodone-acetaminophen, Latex, Oseltamivir, Penicillins, Vancomycin, Hydrocodone, Blood-group specific substance, Clavulanic acid, Fentanyl, Naltrexone, Other drug allergy (see comments), Oxycodone, Adhesive tape, Band-aid anti-itch, Cephalosporins, and Lamotrigine    PHYSICAL EXAM:    /85   Pulse 98   Temp 97.1  F (36.2  C) (Temporal)   Resp 22   SpO2 96%     GENERAL: Pleasant, no obvious distress, sitting up on gurney.   NECK: Supple without  adenopathy  EYES: No scleral icterus  LUNGS: Clear to auscultation bilaterally  HEART: Regular rate and rhythm, S1 and S2 present, no lower extremity edema  ABDOMEN: Obese. Non-distended. Positive bowel sounds. Soft, mild left upper quadrant tenderness with voluntary guarding, no rebound/mass, no obvious organomegaly  MUSKULOSKELETAL:  Warm and well perfused, moves all extremities well  SKIN: No jaundice  NEUROLOGIC: Alert and oriented  PSYCHIATRIC: Normal affect    LAB DATA:  CMP Results:   Recent Labs   Lab Test 11/29/22 2132 11/14/22 2220 10/23/22  1800 10/15/22  2319 10/12/22  0944 10/08/22  2013    139 138 143 140 137   POTASSIUM 3.6 3.8 3.7 3.8 4.4 3.6   CHLORIDE 103 107 101 105 103 100   CO2 26 17* 26 26 26 26   ANIONGAP 12 15 11 12 11 11   * 138* 125 122 125* 121*   BUN 11 12.8 12 10 13.3 11.7   CR 0.72 0.71 0.66 0.70 0.52 0.61   BILITOTAL  --  0.2  --  0.2 0.2 <0.2   ALKPHOS  --  78  --  65 66 78   ALT  --  30  --  40 36* 35   AST  --  41*  --  36  --  34      CBCNo lab results found in last 7 days.  INRNo lab results found in last 7 days.   Lipase   Date Value Ref Range Status   10/15/2022 25 0 - 52 U/L Final   10/08/2022 28 13 - 60 U/L Final   10/03/2022 112 73 - 393 U/L Final   09/24/2022 175 73 - 393 U/L Final   07/09/2022 49 13 - 60 U/L Final   11/07/2020 105 73 - 393 U/L Final   10/31/2020 98 73 - 393 U/L Final   10/30/2020 75 73 - 393 U/L Final     IMAGING:  EXAM: XR ABDOMEN 2 VIEWS  LOCATION: [facility]  DATE/TIME: 12/8/2022 7:54 AM     INDICATION: Swallowed a wire twist tie, eval for perforation, passage  COMPARISON: 12/07/2022                                                                      IMPRESSION: A metallic wire object folded upon itself in the left upper quadrant in the expected location of stomach. This has a different configuration than the study from 12/07/2022. Cholecystectomy clips right upper quadrant. Nonobstructive bowel gas   pattern  with no free air or gross organomegaly.    ASSESSMENT:  1. Foreign body ingestion  32 year old female with history of frequent foreign body ingestion here with abdominal pain and metal wire seen in stomach after she swallowed a bread bag twist tie. Plan for EGD today. NPO.     PLAN:  1. NPO  2. EGD today    Discussed with Dr. Harris will also visit with the patient.     TIME SPENT: 30 min including chart review, patient interview and care coordination.                                                Ivonne De La Rosa PA-C  Thank you for the opportunity to participate in the care of this patient.   Please feel free to call me with any questions or concerns.  Phone number [phone].

## 2022-12-08 NOTE — ANESTHESIA POSTPROCEDURE EVALUATION
Patient: Nevin Alvarado    Procedure: Procedure(s):  ESOPHAGOGASTRODUODENOSCOPY (EGD) with foreign body removal       Anesthesia Type:  MAC    Note:  Disposition: Outpatient   Postop Pain Control: Uneventful            Sign Out: Well controlled pain   PONV: No   Neuro/Psych: Uneventful            Sign Out: Acceptable/Baseline neuro status   Airway/Respiratory: Uneventful            Sign Out: Acceptable/Baseline resp. status   CV/Hemodynamics: Uneventful            Sign Out: Acceptable CV status; No obvious hypovolemia; No obvious fluid overload   Other NRE: NONE   DID A NON-ROUTINE EVENT OCCUR? No           Last vitals:  Vitals Value Taken Time   /75 12/08/22 1333   Temp     Pulse 104 12/08/22 1345   Resp 16 12/08/22 1332   SpO2 95 % 12/08/22 1345   Vitals shown include unvalidated device data.    Electronically Signed By: Erich Gomez MD  December 8, 2022  1:47 PM

## 2022-12-08 NOTE — ED TRIAGE NOTES
Patient arrived via EMS after swallowing a metal twist tie. C/o LUQ 8/10 pain. HR elevated. Nausea reported. VSS. RA     Triage Assessment     Row Name 12/07/22 2041       Triage Assessment (Adult)    Airway WDL WDL       Respiratory WDL    Respiratory WDL WDL       Skin Circulation/Temperature WDL    Skin Circulation/Temperature WDL WDL       Cardiac WDL    Cardiac WDL WDL       Peripheral/Neurovascular WDL    Peripheral Neurovascular WDL WDL

## 2022-12-20 ENCOUNTER — HOSPITAL ENCOUNTER (EMERGENCY)
Facility: CLINIC | Age: 32
Discharge: HOME | End: 2022-12-20
Attending: EMERGENCY MEDICINE | Admitting: EMERGENCY MEDICINE
Payer: COMMERCIAL

## 2022-12-20 ENCOUNTER — APPOINTMENT (OUTPATIENT)
Dept: RADIOLOGY | Facility: CLINIC | Age: 32
End: 2022-12-20
Attending: EMERGENCY MEDICINE
Payer: COMMERCIAL

## 2022-12-20 VITALS
HEART RATE: 93 BPM | DIASTOLIC BLOOD PRESSURE: 83 MMHG | TEMPERATURE: 98.1 F | HEIGHT: 62 IN | BODY MASS INDEX: 53.92 KG/M2 | WEIGHT: 293 LBS | OXYGEN SATURATION: 95 % | SYSTOLIC BLOOD PRESSURE: 128 MMHG | RESPIRATION RATE: 16 BRPM

## 2022-12-20 DIAGNOSIS — S86.911A KNEE STRAIN, RIGHT, INITIAL ENCOUNTER: ICD-10-CM

## 2022-12-20 PROCEDURE — 99283 EMERGENCY DEPT VISIT LOW MDM: CPT

## 2022-12-20 PROCEDURE — 250N000013 HC RX MED GY IP 250 OP 250 PS 637: Performed by: EMERGENCY MEDICINE

## 2022-12-20 PROCEDURE — 73562 X-RAY EXAM OF KNEE 3: CPT | Mod: RT

## 2022-12-20 RX ORDER — IBUPROFEN 400 MG/1
400 TABLET, FILM COATED ORAL ONCE
Status: COMPLETED | OUTPATIENT
Start: 2022-12-20 | End: 2022-12-20

## 2022-12-20 RX ORDER — ACETAMINOPHEN 325 MG/1
650 TABLET ORAL ONCE
Status: COMPLETED | OUTPATIENT
Start: 2022-12-20 | End: 2022-12-20

## 2022-12-20 RX ADMIN — ACETAMINOPHEN 650 MG: 325 TABLET ORAL at 18:54

## 2022-12-20 RX ADMIN — IBUPROFEN 400 MG: 400 TABLET ORAL at 18:54

## 2022-12-20 ASSESSMENT — ACTIVITIES OF DAILY LIVING (ADL)
ADLS_ACUITY_SCORE: 40
ADLS_ACUITY_SCORE: 40

## 2022-12-21 NOTE — ED PROVIDER NOTES
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     Other reaction(s): Redness  Flushed face, very itchy; HUT Comment: Flushed face, very itchy; HUT Reaction: Angioedema; HUT Reaction: Redness; HUT Severity: Med; HUT Noted: [id number]    facial     Hydrocodone Nausea and Vomiting and GI Disturbance     vomiting for days, PN:  vomiting for days; HUT Comment: vomiting for days; HUT Reaction: Gastrointestinal; HUT Reaction: Nausea And Vomiting; HUT Reaction Type: Intolerance; HUT Severity: Med; HUT Noted: [id number]  vomiting for days       Blood-Group Specific Substance Other (See Comments)     Patient has an anti-Cw and non-specific antibodies. Blood product orders may be delayed. Draw one red top and two purple top tubes for all type/screen/crossmatch orders.  Patient has anti-Cw, anti-K (Angella), Warm auto and nonspecific antibodies. Blood products may be delayed. Draw patient 24 hours prior to transfusion. Draw one red top and two purple top tubes for all type and screen orders.     Clavulanic Acid Angioedema     Fentanyl Itching     Naltrexone Other (See Comments)     Reaction(s): Vivid dreams.     Other Drug Allergy (See Comments)      See original file MRN [id number]. Files are marked for merge     Oxycodone Swelling     Adhesive Tape Rash     Silicone type     Band-Aid Anti-Itch      Other reaction(s): adhesive allergy     Cephalosporins Rash     Lamotrigine Rash     Possibly associated with Lamictal.   HUT Comment: Possibly associated with Lamictal. ; HUT Reaction: Rash; HUT Reaction Type: Allergy; HUT Severity: Low; HUT Noted: [id number]       FAMILY HISTORY:  Family History   Problem Relation Age of Onset     Diabetes Type 2  Maternal Grandmother      Diabetes Type 2  Paternal Grandmother      Breast Cancer Paternal Grandmother      Cerebrovascular Disease Father 53     Myocardial Infarction No family hx of      Coronary Artery Disease Early Onset No family hx of      Ovarian Cancer No family hx of      Colon Cancer No family hx of      Depression Mother      Anxiety Disorder Mother        SOCIAL HISTORY:   Social History     Socioeconomic History     Marital status: Single   Occupational History     Occupation: On disability   Tobacco Use     Smoking status: Never     Smokeless tobacco: Never   Substance and Sexual Activity      "Alcohol use: No     Alcohol/week: 0.0 standard drinks     Drug use: No     Sexual activity: Not Currently     Partners: Male     Birth control/protection: I.U.D.     Comment: IUD - Mirena - placed July, 2015   Social History Narrative    Single.    Living in independent living portion of [employer] [facility].    On disability.    No regular exercise.        VITALS:  /83   Pulse 93   Temp 98.1  F (36.7  C) (Oral)   Resp 16   Ht 1.575 m (5' 2\")   Wt 136.5 kg (301 lb)   LMP 12/01/2022   SpO2 95%   BMI 55.05 kg/m      PHYSICAL EXAM    Constitutional: Obese  young female patient, sitting up in bed, no acute distress  HENT: Normocephalic, Atraumatic. Neck Supple.  Eyes: EOMI, Conjunctiva normal.  Respiratory: Breathing comfortably on room air. Speaks full sentences easily. Lungs clear to ascultation.  Cardiovascular: Normal heart rate, Regular rhythm. No peripheral edema.  Abdomen: Soft  Musculoskeletal: Mild pain with range of motion of the right knee.  There is no ecchymosis or traumatic deformity.  No palpable effusion.  Patient is able to flex and extend knee without assistance.  Distal perfusion and sensation are intact.  Integument: Warm, Dry.  Neurologic: Alert & awake, Normal motor function, Normal sensory function, No focal deficits noted.   Psychiatric: Cooperative. Affect appropriate.     LAB:  All pertinent labs reviewed and interpreted.  Labs Ordered and Resulted from Time of ED Arrival to Time of ED Departure - No data to display    RADIOLOGY:  Reviewed all pertinent imaging. Please see official radiology report.  XR Knee Right 3 Views   Final Result   IMPRESSION: No fractures are evident. No knee joint effusion. Normal patellar alignment.              I, Kwesi Rashid, am serving as a scribe to document services personally performed by Dr. Jeffy Bhardwaj, based on my observation and the provider's statements to me. IJeffy MD attest that Kwesi Rashid is acting in a scribe capacity, has " observed my performance of the services and has documented them in accordance with my direction.    Jeffy Bhardwaj M.D.  Emergency Medicine  [facility] EMERGENCY ROOM  [address] [address]  [phone]  Dept: [phone]      Jeffy Bhardwaj MD  12/20/22 0404

## 2022-12-21 NOTE — DISCHARGE INSTRUCTIONS
You were seen in the emergency department for right knee pain after a fall.  Your evaluation included x-rays which look encouraging.  We do think your symptoms will probably get better within the next few days without much additional intervention.  You can use Tylenol 600 mg and ibuprofen 650 mg every 6 hours for pain.  Please keep ice on the leg for the next couple of days and keep it elevated to help with pain and swelling.  If symptoms or not improving you can discuss things further with your primary doctor or with a specialist clinic like [facility] within the next couple of weeks.

## 2022-12-21 NOTE — ED TRIAGE NOTES
Patient reports she slipped on bathroom floor and landed on her right knee. Patient reports pain in knee. CMS intact. Patient given 200 mcg fent I by EMS.      Triage Assessment     Row Name 12/20/22 2247       Triage Assessment (Adult)    Airway WDL WDL       Respiratory WDL    Respiratory WDL WDL       Skin Circulation/Temperature WDL    Skin Circulation/Temperature WDL WDL       Peripheral/Neurovascular WDL    Peripheral Neurovascular WDL WDL       Cognitive/Neuro/Behavioral WDL    Cognitive/Neuro/Behavioral WDL WDL

## 2022-12-27 ENCOUNTER — APPOINTMENT (OUTPATIENT)
Dept: GENERAL RADIOLOGY | Facility: CLINIC | Age: 32
End: 2022-12-27
Attending: EMERGENCY MEDICINE
Payer: COMMERCIAL

## 2022-12-27 ENCOUNTER — HOSPITAL ENCOUNTER (OUTPATIENT)
Facility: CLINIC | Age: 32
Setting detail: OBSERVATION
Discharge: NURSING, REHAB OR LONG-TERM CARE | End: 2022-12-28
Attending: EMERGENCY MEDICINE | Admitting: INTERNAL MEDICINE
Payer: COMMERCIAL

## 2022-12-27 DIAGNOSIS — T18.9XXA SWALLOWED FOREIGN BODY, INITIAL ENCOUNTER: ICD-10-CM

## 2022-12-27 DIAGNOSIS — Z11.52 ENCOUNTER FOR SCREENING LABORATORY TESTING FOR SEVERE ACUTE RESPIRATORY SYNDROME CORONAVIRUS 2 (SARS-COV-2): ICD-10-CM

## 2022-12-27 LAB
ALBUMIN SERPL BCG-MCNC: 4 G/DL (ref 3.5–5.2)
ALP SERPL-CCNC: 75 U/L (ref 35–104)
ALT SERPL W P-5'-P-CCNC: 27 U/L (ref 10–35)
ANION GAP SERPL CALCULATED.3IONS-SCNC: 13 MMOL/L (ref 7–15)
AST SERPL W P-5'-P-CCNC: 25 U/L (ref 10–35)
BASOPHILS # BLD AUTO: 0 10E3/UL (ref 0–0.2)
BASOPHILS NFR BLD AUTO: 1 %
BILIRUB SERPL-MCNC: 0.2 MG/DL
BUN SERPL-MCNC: 9.8 MG/DL (ref 6–20)
CALCIUM SERPL-MCNC: 9.6 MG/DL (ref 8.6–10)
CHLORIDE SERPL-SCNC: 102 MMOL/L (ref 98–107)
CREAT SERPL-MCNC: 0.56 MG/DL (ref 0.51–0.95)
DEPRECATED HCO3 PLAS-SCNC: 26 MMOL/L (ref 22–29)
EOSINOPHIL # BLD AUTO: 0.3 10E3/UL (ref 0–0.7)
EOSINOPHIL NFR BLD AUTO: 3 %
ERYTHROCYTE [DISTWIDTH] IN BLOOD BY AUTOMATED COUNT: 13.3 % (ref 10–15)
GFR SERPL CREATININE-BSD FRML MDRD: >90 ML/MIN/1.73M2
GLUCOSE SERPL-MCNC: 113 MG/DL (ref 70–99)
HCT VFR BLD AUTO: 37 % (ref 35–47)
HGB BLD-MCNC: 11.9 G/DL (ref 11.7–15.7)
HOLD SPECIMEN: NORMAL
IMM GRANULOCYTES # BLD: 0 10E3/UL
IMM GRANULOCYTES NFR BLD: 1 %
LYMPHOCYTES # BLD AUTO: 2.1 10E3/UL (ref 0.8–5.3)
LYMPHOCYTES NFR BLD AUTO: 24 %
MCH RBC QN AUTO: 28.5 PG (ref 26.5–33)
MCHC RBC AUTO-ENTMCNC: 32.2 G/DL (ref 31.5–36.5)
MCV RBC AUTO: 89 FL (ref 78–100)
MONOCYTES # BLD AUTO: 0.5 10E3/UL (ref 0–1.3)
MONOCYTES NFR BLD AUTO: 6 %
NEUTROPHILS # BLD AUTO: 5.8 10E3/UL (ref 1.6–8.3)
NEUTROPHILS NFR BLD AUTO: 65 %
NRBC # BLD AUTO: 0 10E3/UL
NRBC BLD AUTO-RTO: 0 /100
NT-PROBNP SERPL-MCNC: 57 PG/ML (ref 0–450)
PLATELET # BLD AUTO: 274 10E3/UL (ref 150–450)
POTASSIUM SERPL-SCNC: 3.8 MMOL/L (ref 3.4–5.3)
PROT SERPL-MCNC: 6.6 G/DL (ref 6.4–8.3)
RBC # BLD AUTO: 4.17 10E6/UL (ref 3.8–5.2)
SODIUM SERPL-SCNC: 141 MMOL/L (ref 136–145)
WBC # BLD AUTO: 8.7 10E3/UL (ref 4–11)

## 2022-12-27 PROCEDURE — 120N000002 HC R&B MED SURG/OB UMMC

## 2022-12-27 PROCEDURE — 85025 COMPLETE CBC W/AUTO DIFF WBC: CPT | Performed by: EMERGENCY MEDICINE

## 2022-12-27 PROCEDURE — 83880 ASSAY OF NATRIURETIC PEPTIDE: CPT | Performed by: EMERGENCY MEDICINE

## 2022-12-27 PROCEDURE — 74019 RADEX ABDOMEN 2 VIEWS: CPT

## 2022-12-27 PROCEDURE — 99222 1ST HOSP IP/OBS MODERATE 55: CPT | Performed by: INTERNAL MEDICINE

## 2022-12-27 PROCEDURE — 99285 EMERGENCY DEPT VISIT HI MDM: CPT | Mod: 25

## 2022-12-27 PROCEDURE — 74019 RADEX ABDOMEN 2 VIEWS: CPT | Mod: 26 | Performed by: RADIOLOGY

## 2022-12-27 PROCEDURE — 99207 PR APP CREDIT; MD BILLING SHARED VISIT: CPT | Performed by: PHYSICIAN ASSISTANT

## 2022-12-27 PROCEDURE — C9803 HOPD COVID-19 SPEC COLLECT: HCPCS

## 2022-12-27 PROCEDURE — 84703 CHORIONIC GONADOTROPIN ASSAY: CPT | Performed by: PHYSICIAN ASSISTANT

## 2022-12-27 PROCEDURE — 80053 COMPREHEN METABOLIC PANEL: CPT | Performed by: EMERGENCY MEDICINE

## 2022-12-27 PROCEDURE — 36415 COLL VENOUS BLD VENIPUNCTURE: CPT | Performed by: EMERGENCY MEDICINE

## 2022-12-27 PROCEDURE — 99285 EMERGENCY DEPT VISIT HI MDM: CPT | Performed by: EMERGENCY MEDICINE

## 2022-12-27 RX ORDER — MEDROXYPROGESTERONE ACETATE 10 MG
10 TABLET ORAL DAILY
Status: CANCELLED | OUTPATIENT
Start: 2022-12-28

## 2022-12-27 RX ORDER — LANOLIN ALCOHOL/MO/W.PET/CERES
6 CREAM (GRAM) TOPICAL
Status: DISCONTINUED | OUTPATIENT
Start: 2022-12-27 | End: 2022-12-28 | Stop reason: HOSPADM

## 2022-12-27 RX ORDER — ALBUTEROL SULFATE 90 UG/1
2 AEROSOL, METERED RESPIRATORY (INHALATION) EVERY 6 HOURS PRN
Status: DISCONTINUED | OUTPATIENT
Start: 2022-12-27 | End: 2022-12-28 | Stop reason: HOSPADM

## 2022-12-27 RX ORDER — CETIRIZINE HYDROCHLORIDE 5 MG/1
10 TABLET ORAL AT BEDTIME
Status: DISCONTINUED | OUTPATIENT
Start: 2022-12-27 | End: 2022-12-28 | Stop reason: HOSPADM

## 2022-12-27 RX ORDER — ONDANSETRON 4 MG/1
4 TABLET, ORALLY DISINTEGRATING ORAL EVERY 6 HOURS PRN
Status: DISCONTINUED | OUTPATIENT
Start: 2022-12-27 | End: 2022-12-28 | Stop reason: HOSPADM

## 2022-12-27 RX ORDER — PREGABALIN 100 MG/1
100 CAPSULE ORAL 3 TIMES DAILY
Status: DISCONTINUED | OUTPATIENT
Start: 2022-12-28 | End: 2022-12-28 | Stop reason: HOSPADM

## 2022-12-27 RX ORDER — BUSPIRONE HYDROCHLORIDE 10 MG/1
20 TABLET ORAL 3 TIMES DAILY
Status: DISCONTINUED | OUTPATIENT
Start: 2022-12-28 | End: 2022-12-28 | Stop reason: HOSPADM

## 2022-12-27 RX ORDER — OLANZAPINE 2.5 MG/1
2.5 TABLET, FILM COATED ORAL DAILY PRN
Status: DISCONTINUED | OUTPATIENT
Start: 2022-12-27 | End: 2022-12-28 | Stop reason: HOSPADM

## 2022-12-27 RX ORDER — HYDROXYCHLOROQUINE SULFATE 200 MG/1
200 TABLET, FILM COATED ORAL 2 TIMES DAILY
Status: DISCONTINUED | OUTPATIENT
Start: 2022-12-27 | End: 2022-12-28 | Stop reason: HOSPADM

## 2022-12-27 RX ORDER — FUROSEMIDE 20 MG
20 TABLET ORAL DAILY
Status: DISCONTINUED | OUTPATIENT
Start: 2022-12-28 | End: 2022-12-28 | Stop reason: HOSPADM

## 2022-12-27 RX ORDER — OLANZAPINE 2.5 MG/1
2.5 TABLET, FILM COATED ORAL
Status: DISCONTINUED | OUTPATIENT
Start: 2022-12-28 | End: 2022-12-27

## 2022-12-27 RX ORDER — AMOXICILLIN 250 MG
1 CAPSULE ORAL 2 TIMES DAILY PRN
Status: DISCONTINUED | OUTPATIENT
Start: 2022-12-27 | End: 2022-12-28 | Stop reason: HOSPADM

## 2022-12-27 RX ORDER — ONDANSETRON 2 MG/ML
4 INJECTION INTRAMUSCULAR; INTRAVENOUS EVERY 6 HOURS PRN
Status: DISCONTINUED | OUTPATIENT
Start: 2022-12-27 | End: 2022-12-28 | Stop reason: HOSPADM

## 2022-12-27 RX ORDER — ACETAMINOPHEN 325 MG/1
650 TABLET ORAL EVERY 8 HOURS PRN
Status: DISCONTINUED | OUTPATIENT
Start: 2022-12-27 | End: 2022-12-28 | Stop reason: HOSPADM

## 2022-12-27 RX ORDER — AMOXICILLIN 250 MG
2 CAPSULE ORAL 2 TIMES DAILY PRN
Status: DISCONTINUED | OUTPATIENT
Start: 2022-12-27 | End: 2022-12-28 | Stop reason: HOSPADM

## 2022-12-27 RX ORDER — DESVENLAFAXINE 100 MG/1
100 TABLET, EXTENDED RELEASE ORAL DAILY
Status: DISCONTINUED | OUTPATIENT
Start: 2022-12-28 | End: 2022-12-28 | Stop reason: HOSPADM

## 2022-12-27 RX ORDER — MONTELUKAST SODIUM 10 MG/1
10 TABLET ORAL EVERY EVENING
Status: DISCONTINUED | OUTPATIENT
Start: 2022-12-27 | End: 2022-12-28 | Stop reason: HOSPADM

## 2022-12-27 ASSESSMENT — ACTIVITIES OF DAILY LIVING (ADL)
ADLS_ACUITY_SCORE: 40

## 2022-12-27 NOTE — LETTER
[facility] EMERGENCY DEPARTMENT  [address]  [place] [address]  [phone]    FACSIMILE TRANSMITTAL SHEET      TO: Miriam   COMPANY: [employer]  FAX NUMBER: [phone]  PHONE NUMBER: [phone]     FROM: MYLES Araya, LGSW  ED/OBS   [facility]  PHONE: [phone]  FAX: [phone]  DATE: 2022     _____URGENT _____REVIEW ONLY _____PLEASE COMMENT____PLEASE REPLY    NOTES/COMMENTS: Discharge orders for JF ( 1990)                                IF YOU DID NOT RECEIVE THE CORRECT NUMBER OF PAGES OR THE FAX DID NOT COME THROUGH CLEARLY, PLEASE CALL THE SENDER     CONFIDENTIALITY STATEMENT: Confidential information that may accompany this transmission contains protected health information under state and federal law and is legally privileged. This information is intended only for the use of the individual or entity named above and may be used only for carrying out treatment, payment or other healthcare operations. The recipient or person responsible for delivering this information is prohibited by law from disclosing this information without proper authorization to any other party, unless required to do so by law or regulation. If you are not the intended recipient, you are hereby notified that any review, dissemination, distribution, or copying of this message is strictly prohibited. If you have received this communication in error, please destroy the materials and contact us immediately by calling the number listed above. No response indicates that the information was received by the appropriate authorized party

## 2022-12-27 NOTE — LETTER
[facility] EMERGENCY DEPARTMENT  [address]  [place] [address]  [phone]    FACSIMILE TRANSMITTAL SHEET    TO: Miriam   COMPANY: [employer]  FAX NUMBER: [phone]  PHONE NUMBER: [phone]     FROM: MYLES Araya, LGSW  ED/OBS   [facility]  PHONE: [phone]  FAX: [phone]  DATE: 2022     _____URGENT _____REVIEW ONLY _____PLEASE COMMENT____PLEASE REPLY    NOTES/COMMENTS: After Visit Summary for ORALIA ( 1990)                                      IF YOU DID NOT RECEIVE THE CORRECT NUMBER OF PAGES OR THE FAX DID NOT COME THROUGH CLEARLY, PLEASE CALL THE SENDER     CONFIDENTIALITY STATEMENT: Confidential information that may accompany this transmission contains protected health information under state and federal law and is legally privileged. This information is intended only for the use of the individual or entity named above and may be used only for carrying out treatment, payment or other healthcare operations. The recipient or person responsible for delivering this information is prohibited by law from disclosing this information without proper authorization to any other party, unless required to do so by law or regulation. If you are not the intended recipient, you are hereby notified that any review, dissemination, distribution, or copying of this message is strictly prohibited. If you have received this communication in error, please destroy the materials and contact us immediately by calling the number listed above. No response indicates that the information was received by the appropriate authorized party

## 2022-12-28 VITALS
WEIGHT: 293 LBS | DIASTOLIC BLOOD PRESSURE: 61 MMHG | OXYGEN SATURATION: 96 % | HEART RATE: 108 BPM | TEMPERATURE: 98.8 F | BODY MASS INDEX: 51.91 KG/M2 | SYSTOLIC BLOOD PRESSURE: 113 MMHG | HEIGHT: 63 IN | RESPIRATION RATE: 16 BRPM

## 2022-12-28 LAB
ANION GAP SERPL CALCULATED.3IONS-SCNC: 10 MMOL/L (ref 7–15)
BUN SERPL-MCNC: 10 MG/DL (ref 6–20)
CALCIUM SERPL-MCNC: 9.4 MG/DL (ref 8.6–10)
CHLORIDE SERPL-SCNC: 103 MMOL/L (ref 98–107)
CREAT SERPL-MCNC: 0.52 MG/DL (ref 0.51–0.95)
DEPRECATED HCO3 PLAS-SCNC: 27 MMOL/L (ref 22–29)
ERYTHROCYTE [DISTWIDTH] IN BLOOD BY AUTOMATED COUNT: 13.4 % (ref 10–15)
GFR SERPL CREATININE-BSD FRML MDRD: >90 ML/MIN/1.73M2
GLUCOSE SERPL-MCNC: 135 MG/DL (ref 70–99)
HCG SERPL QL: NEGATIVE
HCT VFR BLD AUTO: 38.2 % (ref 35–47)
HGB BLD-MCNC: 12.1 G/DL (ref 11.7–15.7)
INR PPP: 1.06 (ref 0.85–1.15)
MCH RBC QN AUTO: 28.3 PG (ref 26.5–33)
MCHC RBC AUTO-ENTMCNC: 31.7 G/DL (ref 31.5–36.5)
MCV RBC AUTO: 89 FL (ref 78–100)
PLATELET # BLD AUTO: 249 10E3/UL (ref 150–450)
POTASSIUM SERPL-SCNC: 4.2 MMOL/L (ref 3.4–5.3)
RBC # BLD AUTO: 4.28 10E6/UL (ref 3.8–5.2)
SARS-COV-2 RNA RESP QL NAA+PROBE: NEGATIVE
SODIUM SERPL-SCNC: 140 MMOL/L (ref 136–145)
UPPER GI ENDOSCOPY: NORMAL
WBC # BLD AUTO: 5.5 10E3/UL (ref 4–11)

## 2022-12-28 PROCEDURE — 250N000013 HC RX MED GY IP 250 OP 250 PS 637: Performed by: PHYSICIAN ASSISTANT

## 2022-12-28 PROCEDURE — 99217 PR OBSERVATION CARE DISCHARGE: CPT | Performed by: STUDENT IN AN ORGANIZED HEALTH CARE EDUCATION/TRAINING PROGRAM

## 2022-12-28 PROCEDURE — 36415 COLL VENOUS BLD VENIPUNCTURE: CPT | Performed by: PHYSICIAN ASSISTANT

## 2022-12-28 PROCEDURE — 85027 COMPLETE CBC AUTOMATED: CPT | Performed by: PHYSICIAN ASSISTANT

## 2022-12-28 PROCEDURE — G0500 MOD SEDAT ENDO SERVICE >5YRS: HCPCS | Performed by: INTERNAL MEDICINE

## 2022-12-28 PROCEDURE — 82310 ASSAY OF CALCIUM: CPT | Performed by: PHYSICIAN ASSISTANT

## 2022-12-28 PROCEDURE — G0378 HOSPITAL OBSERVATION PER HR: HCPCS

## 2022-12-28 PROCEDURE — 250N000011 HC RX IP 250 OP 636: Performed by: PHYSICIAN ASSISTANT

## 2022-12-28 PROCEDURE — 250N000009 HC RX 250: Performed by: INTERNAL MEDICINE

## 2022-12-28 PROCEDURE — 85610 PROTHROMBIN TIME: CPT | Performed by: PHYSICIAN ASSISTANT

## 2022-12-28 PROCEDURE — 250N000011 HC RX IP 250 OP 636: Performed by: INTERNAL MEDICINE

## 2022-12-28 PROCEDURE — U0005 INFEC AGEN DETEC AMPLI PROBE: HCPCS | Performed by: PHYSICIAN ASSISTANT

## 2022-12-28 PROCEDURE — 43247 EGD REMOVE FOREIGN BODY: CPT | Performed by: INTERNAL MEDICINE

## 2022-12-28 RX ORDER — FENTANYL CITRATE 50 UG/ML
INJECTION, SOLUTION INTRAMUSCULAR; INTRAVENOUS PRN
Status: DISCONTINUED | OUTPATIENT
Start: 2022-12-28 | End: 2022-12-28 | Stop reason: HOSPADM

## 2022-12-28 RX ORDER — DIPHENHYDRAMINE HYDROCHLORIDE 50 MG/ML
INJECTION INTRAMUSCULAR; INTRAVENOUS PRN
Status: DISCONTINUED | OUTPATIENT
Start: 2022-12-28 | End: 2022-12-28 | Stop reason: HOSPADM

## 2022-12-28 RX ADMIN — HYDROXYCHLOROQUINE SULFATE 200 MG: 200 TABLET, FILM COATED ORAL at 00:04

## 2022-12-28 RX ADMIN — DESVENLAFAXINE SUCCINATE 100 MG: 100 TABLET, EXTENDED RELEASE ORAL at 08:33

## 2022-12-28 RX ADMIN — OLANZAPINE 2.5 MG: 2.5 TABLET, FILM COATED ORAL at 04:36

## 2022-12-28 RX ADMIN — BREXPIPRAZOLE 2 MG: 1 TABLET ORAL at 00:04

## 2022-12-28 RX ADMIN — ONDANSETRON 4 MG: 4 TABLET, ORALLY DISINTEGRATING ORAL at 08:32

## 2022-12-28 RX ADMIN — HYDROXYCHLOROQUINE SULFATE 200 MG: 200 TABLET, FILM COATED ORAL at 08:32

## 2022-12-28 RX ADMIN — PREGABALIN 100 MG: 100 CAPSULE ORAL at 08:32

## 2022-12-28 RX ADMIN — CETIRIZINE HYDROCHLORIDE 10 MG: 1 SOLUTION ORAL at 00:04

## 2022-12-28 RX ADMIN — ONDANSETRON 4 MG: 4 TABLET, ORALLY DISINTEGRATING ORAL at 00:03

## 2022-12-28 RX ADMIN — ACETAMINOPHEN 650 MG: 325 TABLET, FILM COATED ORAL at 00:04

## 2022-12-28 RX ADMIN — FUROSEMIDE 20 MG: 20 TABLET ORAL at 08:32

## 2022-12-28 RX ADMIN — MONTELUKAST 10 MG: 10 TABLET, FILM COATED ORAL at 00:04

## 2022-12-28 RX ADMIN — BUSPIRONE HYDROCHLORIDE 20 MG: 10 TABLET ORAL at 08:33

## 2022-12-28 ASSESSMENT — ACTIVITIES OF DAILY LIVING (ADL)
ADLS_ACUITY_SCORE: 40

## 2022-12-28 NOTE — UTILIZATION REVIEW
"Admission Status; Secondary Review Determination     Admission Date: 12/27/2022  6:43 PM       Under the authority of the Utilization Management Committee, the utilization review process indicated a secondary review on the above patient.  The review outcome is based on review of the medical records, discussions with staff, and applying clinical experience noted on the date of the review.          (x) Observation Status Appropriate - This patient does not meet hospital inpatient criteria and is placed in observation status. If this patient's primary payer is Medicare and was admitted as an inpatient, Condition Code 44 should be used and patient status changed to \"observation\".       RATIONALE FOR DETERMINATION      Brief clinical presentation, information copied from the chart, abbreviated and edited for relevant content:     Not meeting IP - paged team to change to OBS.    Nevin Alvarado is a 32 year old female with PMHx of borderline personality disorder, MDD, PTSD, frequent foreign body ingestions, Hx of provoked PE, asthma, ZOHRA, granuloma annular, DM2, GERD, admitted on 12/27/2022 after an ingestion of foreign body. Has had multiple prior EGDs. Previously complicated by esophageal perforation. Patient reported ingesting a 3-4 inch wire. KUB with linear radiodense foreign body in stomach. Abdomen soft without any peritoneal signs. GI reviewed and concerned it would not pass and require removal with endoscopy. NPO with plan for EGD today. If successful removal with no complications, will be discharged shortly thereafter.   If complications, then IP can be considered with another review.      The severity of illness, intensity of cares provided, risk for adverse outcome, and expected LOS make the care appropriate for observation.       The information on this document is developed by the utilization review team in order for the business office to ensure compliance.  This only denotes the appropriateness of " proper admission status and does not reflect the quality of care rendered.         The definitions of Inpatient Status and Observation Status used in making the determination above are those provided in the CMS Coverage Manual, Chapter 1 and Chapter 6, section 70.4.      Sincerely,     Soheila Purcell MD   Utilization Review/ Case Management  [facility].

## 2022-12-28 NOTE — PROGRESS NOTES
v  Brief Social Work Note    Expected Discharge Date:  12/28/2022     Concerns to be Addressed:    discharge transportation    Additional Information:    1245 ALLAN spoke with ED RN Annel who asked SW to arrange discharge transportation for pt.    1253 ALLAN spoke with GH manager Marcos Mueller ([phone]) who reported that there were staff at the residence who would be there when pt arrived home.  Marcos asked to be updated with transportation time. SW agreed.    1258  SW contacted [facility] Provide A Ride [phone], spoke with Stacie, and was able to secure transportation for ASAP pt pick-up with [employer] service. SW provided Unit RN station ([phone]) for contact, and informed agent that pt will be at the ED entrance.     1314 SW updated ED HUC with ride information.    6040 ALLAN spoke with RN Mgr Herr regarding discharge paperwork-pt has a legal guardian and was unsure if pt could sign her own paperwork. ALLAN attempted to contact guardian, [employer] (work [phone]; mobile: [phone]). The VM was full on the work number, and there was no answer on the mobile.      1334 ALLAN spoke with Marcos and provided transportation update and explained issue regarding discharge paperwork. SW asked if he would prefer SW email paperwork to him or send it with pt. Marcos did not have fax number readily available but agreed to contact SW if pt was unable to provide paperwork when she arrived home. No additional questions or concerns were reported. ALLAN provided availability and contact information and the call was ended    1343 CARMEN Arias was able to provide name of individual covering pt's assigned guardian who is out of the country and ALLAN contacted her, Miriam ([phone]), and provided discharge update. Miriam told SW that pt was okay to sign discharge papers and asked that a copy be faxed to her, as well (fax: [phone]). SW agreed to do so.    9888 SW faxed AVS and Discharge orders to [place]  [employer]/Miriam LEMUS will continue to follow as needed.    MYLES Araya, [facility]  ED/OBS   [facility]  Phone: [phone]  Pager: [phone]  Fax: [phone]     On-call pager, [phone], 4:00 pm to midnight

## 2022-12-28 NOTE — ED TRIAGE NOTES
Per EMS: Coming from group home. Swallowed wire and EMS called. Said the wire was 3-4 inches long. No airway compromise.

## 2022-12-28 NOTE — CONSULTS
Brief GI Luminal Note:     The GI Luminal Service was consulted for procedure only - EGD intervention for ingestion of foreign body.     S/p EGD with foreign body removal 12/28/2022 with Dr. Hugo Hansen, GI Luminal Staff Physician.   -- Please see 12/28/2022 EGD procedure report for impression and recommendations.     I did not personally see or examine this patient today.     The inpatient gastroenterology service will sign off at this time. Thank you for allowing us to participate in the care of this patient. Please do not hesitate to page the GI service with any questions or concerns.     Gretel Wilcox PA-C  Inpatient Gastroenterology Service  [facility]  Pager: *[phone]  Text Page

## 2022-12-28 NOTE — ED PROVIDER NOTES
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     Alcohol/week: 0.0 standard drinks     Drug use: No     Sexual activity: Not Currently     Partners: Male     Birth control/protection: I.U.D.     Comment: IUD - Mirena - placed July, 2015   Other Topics Concern     Parent/sibling w/ CABG, MI or angioplasty before 65F 55M? Not Asked   Social History Narrative    Single.    Living in independent  "living portion of [employer].    On disability.    No regular exercise.      Social Determinants of Health     Financial Resource Strain: Not on file   Food Insecurity: Not on file   Transportation Needs: Not on file   Physical Activity: Not on file   Stress: Not on file   Social Connections: Not on file   Intimate Partner Violence: Not on file   Housing Stability: Not on file     Social history was reviewed with the patient. Additional pertinent items: None    Review of Systems  General: No fevers or chills  Skin: No rash or diaphoresis  Eyes: No eye redness or discharge  Ears/Nose/Throat: No rhinorrhea or nasal congestion  Respiratory: No cough or SOB  Cardiovascular: No chest pain or palpitations  Gastrointestinal: No nausea, vomiting, or diarrhea, positive for abdominal pain  Genitourinary: No urinary frequency, hematuria, or dysuria  Musculoskeletal: No arthralgias or myalgias  Neurologic: No numbness or weakness  Psychiatric: See HPI  Hematologic/Lymphatic/Immunologic: No leg swelling, no easy bruising/bleeding  Endocrine: No polyuria/polydypsia    A complete review of systems was performed with pertinent positives and negatives noted in the HPI, and all other systems negative.    Physical Exam   BP: (!) 147/99  Pulse: (!) 124  Temp: 99.1  F (37.3  C)  Resp: 24  Height: 160 cm (5' 3\")  Weight: 136.5 kg (300 lb 14.9 oz)  SpO2: 96 %      General: Well nourished, well developed, NAD  HEENT: EOMI, anicteric. NCAT, MMM  Neck: no jugular venous distension, supple, nl ROM  Cardiac: Regular rate and rhythm. No murmurs, rubs, or gallops. Normal S1, S2.  Intact peripheral pulses  Pulm: CTAB, no stridor, wheezes, rales, rhonchi, airway patent  Abd: Soft, tenderness to palpation in the left upper quadrant without rebound or guarding, nondistended.  No masses palpated.    Skin: Warm and dry to the touch.  No rash  Extremities: Trace bilateral LE edema, no cyanosis, w/w/p  Neuro: A&Ox3, no gross focal " deficits  Psych: Calm and cooperative    ED Course        Procedures                          Labs Ordered and Resulted from Time of ED Arrival to Time of ED Departure - No data to display         Results for orders placed or performed during the hospital encounter of 12/27/22 (from the past 24 hour(s))   XR Abdomen 2 Views    Impression    RESIDENT PRELIMINARY INTERPRETATION  IMPRESSION: Linear radiodense presumed foreign body projecting within  the stomach similar to CT 11/29/2022. No appreciable free air.   Extra Tube ([place] [place] [facility])    Narrative    The following orders were created for panel order Extra Tube ([place] [place] [facility]).  Procedure                               Abnormality         Status                     ---------                               -----------         ------                     Extra Red Top Tube[[id number]]                               In process                   Please view results for these tests on the individual orders.   CBC with platelets differential    Narrative    The following orders were created for panel order CBC with platelets differential.  Procedure                               Abnormality         Status                     ---------                               -----------         ------                     CBC with platelets and d...[[id number]]                      In process                   Please view results for these tests on the individual orders.     *Note: Due to a large number of results and/or encounters for the requested time period, some results have not been displayed. A complete set of results can be found in Results Review.       Labs, vital signs, and imaging studies were reviewed by me.    Medications - No data to display    Assessments & Plan (with Medical Decision Making)   Nevin Alvarado is a 32 year old female who presents to the emergency department with swallowed foreign body.  Patient placed on one-to-one observation in the emergency  department and x-ray ordered to further evaluate the patient.  This shows a linear radiodense object present within the stomach.    Labs were also ordered to further evaluate the patient    I have reviewed the nursing notes.    I have reviewed the findings, diagnosis, plan and need for follow up with the patient.    Patient discussed with internal medicine, to be admitted to their service for further management. Plan was discussed with patient who understands and agrees with plan.    New Prescriptions    No medications on file       Final diagnoses:   Swallowed foreign body, initial encounter     Lola Joy MD  12/27/2022   formerly [facility] EMERGENCY DEPARTMENT     Lola Joy MD  12/27/22 [id number]

## 2022-12-28 NOTE — OR NURSING
Patient had EGD with foreign body removal.  Patient tolerated procedure under conscious sedation and 2 liters nasal cannula.  Report called to ED RNAndrea.  Patient transferred back to ED in stable condition.      Prior to procedure, telephone consent was obtained and paper consent signed    **1:1 attendant present for duration of procedure and transport back to ED    Per ED RNrudolph to send patient back to ED with PIV in place.

## 2022-12-28 NOTE — PROGRESS NOTES
Patient alert and oriented x 4 s/p EGD.  Patient tolerated lunch with success and has denied pain and discomfort. RN called group home x 1 for report. RN unsuccessfully able to provide report due to full voicemail. Social work called group home x 1 and called legal guardian x 1 with zero successful attempts to contact.  AVS printed and provided to patient. Patient provided verbal understanding and was discharged via medical cab.    Nelda Israel RN on 12/28/2022 at 1:44 PM

## 2022-12-28 NOTE — H&P
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 shortness of breath / dyspnea or wheezing   alum & mag hydroxide-simethicone (MAALOX MAX) 400-400-40 MG/5ML SUSP suspension   No No   Sig: Take 15 mLs by mouth every 6 hours as needed for heartburn or other (sore  throat)   brexpiprazole (REXULTI) 0.5 MG tablet   Yes No   Sig: Take 2 mg by mouth daily .   busPIRone (BUSPAR) 10 MG tablet  Self No No   Sig: Take 2 tablets (20 mg) by mouth 3 times daily   cetirizine (ZYRTEC) 10 MG tablet  Self No No   Sig: Take 1 tablet (10 mg) by mouth daily   Patient taking differently: Take 10 mg by mouth At Bedtime   desvenlafaxine (PRISTIQ) 100 MG 24 hr tablet  Self No No   Sig: Take 1 tablet (100 mg) by mouth daily   ferrous sulfate (FEROSUL) 325 (65 Fe) MG tablet  Self No No   Sig: Take 1 tablet (325 mg) by mouth daily (with breakfast)   furosemide (LASIX) 20 MG tablet   Yes No   Sig: Take 20 mg by mouth daily   hydroxychloroquine (PLAQUENIL) 200 MG tablet  Self No No   Sig: Take 1 tablet (200 mg) by mouth 2 times daily   ibuprofen (ADVIL/MOTRIN) 600 MG tablet   No No   Sig: Take 1 tablet (600 mg) by mouth every 8 hours as needed for moderate pain   medroxyPROGESTERone (PROVERA) 10 MG tablet   No No   Sig: Take 1 tablet (10 mg) by mouth daily   metFORMIN (GLUCOPHAGE XR) 500 MG 24 hr tablet   Yes No   Sig: Take 1,000 mg by mouth daily (with dinner) Take at 5 pm.   montelukast (SINGULAIR) 10 MG tablet  Self Yes No   Sig: Take 10 mg by mouth every evening   ondansetron (ZOFRAN-ODT) 4 MG ODT tab  Self No No   Sig: Take 1 tablet (4 mg) by mouth every 8 hours as needed for nausea   pregabalin (LYRICA) 100 MG capsule  Self No No   Sig: Take 1 capsule (100 mg) by mouth 3 times daily   valACYclovir (VALTREX) 1000 mg tablet  Self Yes No   Sig: Take 2 tablets by mouth two times daily for one day. Use as needed at onset of cold sore.       Facility-Administered Medications: None     Allergies   Allergies   Allergen Reactions     Amoxicillin-Pot Clavulanate Other (See Comments), Swelling and Rash     PN: facial swelling, left side. Also had cortisone injection the same day as she started the Augmentin.  Noted in downtime recovery of chart.    PN: facial swelling, left side. Also had cortisone  injection the same day as she started the Augmentin.; HUT Comment: PN: facial swelling, left side. Also had cortisone injection the same day as she started the Augmentin.Noted in downtime recovery of chart.; HUT Reaction: Rash; HUT Reaction: Unknown; HUT Reaction Type: Allergy; HUT Severity: Med; HUT Noted: [id number]     Hydrocodone-Acetaminophen Nausea and Vomiting and Rash     Update on 12/12  Pt says she can take tylenol just not the hydrocodone.   Other reaction(s): Rash       Latex Rash     HUT Reaction: Rash; HUT Reaction Type: Allergy; HUT Severity: Low; HUT Noted: [id number]  Other reaction(s): Rash       Oseltamivir Hives     med stopped, PN: med stopped  med stopped, PN: med stopped; HUT Comment: med stopped, PN: med stopped; HUT Reaction: Hives; HUT Reaction Type: Allergy; HUT Severity: Med; HUT Noted: [id number]     Penicillins Anaphylaxis     HUT Reaction: Anaphylaxis; HUT Reaction Type: Allergy; HUT Severity: High; HUT Noted: [id number]     Vancomycin Itching, Swelling and Rash     Other reaction(s): Redness  Flushed face, very itchy; HUT Comment: Flushed face, very itchy; HUT Reaction: Angioedema; HUT Reaction: Redness; HUT Severity: Med; HUT Noted: [id number]    facial     Hydrocodone Nausea and Vomiting and GI Disturbance     vomiting for days, PN: vomiting for days; HUT Comment: vomiting for days; HUT Reaction: Gastrointestinal; HUT Reaction: Nausea And Vomiting; HUT Reaction Type: Intolerance; HUT Severity: Med; HUT Noted: [id number]  vomiting for days       Blood-Group Specific Substance Other (See Comments)     Patient has an anti-Cw and non-specific antibodies. Blood product orders may be delayed. Draw one red top and two purple top tubes for all type/screen/crossmatch orders.  Patient has anti-Cw, anti-K ([place]), Warm auto and nonspecific antibodies. Blood products may be delayed. Draw patient 24 hours prior to transfusion. Draw one red top and two purple top tubes for all type and screen orders.      "Clavulanic Acid Angioedema     Fentanyl Itching     Naltrexone Other (See Comments)     Reaction(s): Vivid dreams.     Other Drug Allergy (See Comments)      See original file MRN [id number]. Files are marked for merge     Oxycodone Swelling     Adhesive Tape Rash     Silicone type     Band-Aid Anti-Itch      Other reaction(s): adhesive allergy     Cephalosporins Rash     Lamotrigine Rash     Possibly associated with Lamictal.   HUT Comment: Possibly associated with Lamictal. ; HUT Reaction: Rash; HUT Reaction Type: Allergy; HUT Severity: Low; HUT Noted: [id number]       Physical Exam   Vital Signs: Temp: 99.1  F (37.3  C) Temp src: Oral BP: (!) 145/84 Pulse: 92   Resp: 16 SpO2: 96 % O2 Device: None (Room air)    Weight: 300 lbs 14.85 oz  Blood pressure (!) 145/84, pulse 92, temperature 99.1  F (37.3  C), temperature source Oral, resp. rate 16, height 1.6 m (5' 3\"), weight 136.5 kg (300 lb 14.9 oz), last menstrual period 12/01/2022, SpO2 96 %, not currently breastfeeding.  GENERAL: Alert and awake. Answering questions appropriately. NAD. Pleasant and conversational.  HEENT: Anicteric sclera. Mucous membranes moist   CARDIOVASCULAR: RRR. S1, S2. No murmurs, rubs, or gallops.   RESPIRATORY: Effort normal on RA. Clear to auscultation bilaterally, no rales, rhonchi or wheezes  GI: Abdomen obese but soft, TTP in LUQ abdomen without rebound or guarding, Hypoactive bowel sounds present  EXTREMITIES: No peripheral edema. +1 pitting edema in LE bilaterally. No calf asymmetry, erythema, or tenderness.   NEUROLOGICAL: CN II-XII grossly intact. Moving all extremities symmetrically.   SKIN: Intact. Warm and dry. No jaundice.   PSYCH: Affect appropriate. Denies any SI.     Data   Data reviewed today: I reviewed all medications, new labs and imaging results over the last 24 hours.    Recent Labs   Lab 12/27/22  2142   WBC 8.7   HGB 11.9   MCV 89         POTASSIUM 3.8   CHLORIDE 102   CO2 26   BUN 9.8   CR 0.56 "   ANIONGAP 13   KELBY 9.6   *   ALBUMIN 4.0   PROTTOTAL 6.6   BILITOTAL 0.2   ALKPHOS 75   ALT 27   AST 25     Recent Results (from the past 24 hour(s))   XR Abdomen 2 Views    Narrative    EXAM: XR ABDOMEN 2 VIEWS  12/27/2022 7:26 PM     HISTORY:  swallowed foreign body       COMPARISON:  CT 11/29/2022    FINDINGS:   Upright and supine frontal views of the abdomen. Redemonstrated linear  radiodense presumed foreign body projecting over the mid abdomen  measuring approximately 7.4 cm in length.     Nonobstructive bowel gas pattern. No portal venous gas, pneumatosis or  free air in the abdomen. Pelvic phleboliths.     The included osseous structures and soft tissues are within normal  limits. Mild S-shaped thoracolumbar scoliosis.    Low lung volumes. Streaky perihilar and retrocardiac opacities likely  represent atelectasis.      Impression    IMPRESSION:   Redemonstrated linear radiodense presumed foreign body projecting over  the midabdomen, indeterminate whether in the stomach or bowel.  Non-obstructive bowel gas pattern. No appreciable free air.    I have personally reviewed the examination and initial interpretation  and I agree with the findings.    STEPHEN BELTRAN MD         SYSTEM ID:  [id number]

## 2022-12-28 NOTE — ED NOTES
Bed: ED09  Expected date: 12/28/22  Expected time:   Means of arrival:   Comments:  Pt returning from Endo

## 2022-12-30 NOTE — DISCHARGE SUMMARY
[facility]  Hospitalist Discharge Summary      Date of Admission:  12/27/2022  Date of Discharge:  12/28/2022  1:58 PM  Discharging Provider: Diana Boo MD  Discharge Service: Hospitalist Service, GOLD TEAM 9    Discharge Diagnoses   Swallowed foreign body (wire)    Follow-ups Needed After Discharge   Follow-up Appointments     Follow Up ([facility])      Follow up as needed with primary care physician.    Follow up with your primary gastroenterologist (Dr. Ulloa) as scheduled.    Appointments on [place] and/or [facility] (with [facility] or [facility]   provider or service). Call [phone] if you haven't heard regarding   these appointments within 7 days of discharge.             Discharge Disposition   Discharged to home  Condition at discharge: Stable    Hospital Course   Nevin Alvarado is a 32 year old female with PMHx of borderline personality disorder, MDD, PTSD, frequent foreign body ingestions, Hx of provoked PE, asthma, ZOHRA, granuloma annular, DM2, GERD, admitted on 12/27/2022 after an ingestion of foreign body (a wire). She was made NPO and taken to endoscopy for removal the next day, which proceeded without complication. Afterwards, she felt back to baseline and was tolerating a regular diet. Nevin was discharged back to her group home in no acute distress and at her baseline mental status.    Consultations This Hospital Stay   GI LUMINAL ADULT IP CONSULT    Code Status   Prior    Time Spent on this Encounter   I, Diana Boo MD, personally saw the patient today and spent greater than 30 minutes discharging this patient.       Diana Boo MD  [facility] EMERGENCY DEPARTMENT  [address] [facility] [address]  Phone: [phone]  ______________________________________________________________________    Physical Exam    Weight: 300 lbs 14.85 oz  GENERAL: The patient is awake and alert, in no apparent distress, appropriate,  pleasant and cooperative. No dysarthria is noted. No discomfort on presentation is noted.  HEAD: Atraumatic, normocephalic.   LUNGS: No increased work of breathing.  HEART: Regular rate and rhythm. No murmur, rubs, or gallops noted. No S3, S4 or rub is auscultated.   ABDOMEN: Soft, nontender and nondistended. Normal bowel sounds are auscultated.  SKIN: No rashes. No jaundice. Pink and warm with good turgor.   PSYCHIATRIC: The patient is oriented x4. Somewhat withdrawal affect.       Primary Care Physician   Latonya Knight    Discharge Orders      Reason for your hospital stay    You were in the hospital for removal of a swallow metal wire.     Activity    Your activity upon discharge: activity as tolerated     Follow Up ([facility])    Follow up as needed with primary care physician.    Follow up with your primary gastroenterologist (Dr. Ulloa) as scheduled.    Appointments on [place] and/or [facility] (with [facility] or [facility] provider or service). Call [phone] if you haven't heard regarding these appointments within 7 days of discharge.     NPO for Medical/Clinical Reasons     Diet    Follow this diet upon discharge: regular diet       Significant Results and Procedures   Results for orders placed or performed during the hospital encounter of 12/27/22   XR Abdomen 2 Views    Narrative    EXAM: XR ABDOMEN 2 VIEWS  12/27/2022 7:26 PM     HISTORY:  swallowed foreign body       COMPARISON:  CT 11/29/2022    FINDINGS:   Upright and supine frontal views of the abdomen. Redemonstrated linear  radiodense presumed foreign body projecting over the mid abdomen  measuring approximately 7.4 cm in length.     Nonobstructive bowel gas pattern. No portal venous gas, pneumatosis or  free air in the abdomen. Pelvic phleboliths.     The included osseous structures and soft tissues are within normal  limits. Mild S-shaped thoracolumbar scoliosis.    Low lung volumes. Streaky perihilar and retrocardiac opacities  likely  represent atelectasis.      Impression    IMPRESSION:   Redemonstrated linear radiodense presumed foreign body projecting over  the midabdomen, indeterminate whether in the stomach or bowel.  Non-obstructive bowel gas pattern. No appreciable free air.    I have personally reviewed the examination and initial interpretation  and I agree with the findings.    STEPHEN BELTRAN MD         SYSTEM ID:  [id number]     *Note: Due to a large number of results and/or encounters for the requested time period, some results have not been displayed. A complete set of results can be found in Results Review.       Discharge Medications   Discharge Medication List as of 12/28/2022 12:47 PM      CONTINUE these medications which have NOT CHANGED    Details   acetaminophen (TYLENOL) 325 MG tablet Take 2 tablets (650 mg) by mouth every 8 hours as needed for mild pain, Disp-10 tablet, R-0, E-Prescribe      albuterol (PROAIR HFA/PROVENTIL HFA/VENTOLIN HFA) 108 (90 Base) MCG/ACT inhaler Inhale 2 puffs into the lungs every 6 hours as needed for shortness of breath / dyspnea or wheezing, Disp-18 g, R-0, [facility] may dispense brand covered by insurance (Proair, or proventil or ventolin or generic albuterol inhaler)      albuterol (PROVENTIL) (2.5 MG/3ML) 0.083% neb solution Take 2.5 mg by nebulization every 6 hours as needed for shortness of breath / dyspnea or wheezing, Historical      alum & mag hydroxide-simethicone (MAALOX MAX) 400-400-40 MG/5ML SUSP suspension Take 15 mLs by mouth every 6 hours as needed for heartburn or other (sore throat), Disp-355 mL, R-0, E-Prescribe      brexpiprazole (REXULTI) 0.5 MG tablet Take 2 mg by mouth daily ., Historical      busPIRone (BUSPAR) 10 MG tablet Take 2 tablets (20 mg) by mouth 3 times daily, Disp-180 tablet, R-0, E-Prescribe      cetirizine (ZYRTEC) 10 MG tablet Take 1 tablet (10 mg) by mouth daily, Disp-30 tablet, R-0, E-Prescribe      Cholecalciferol (VITAMIN D) 50 MCG (2000  UT) CAPS Take 2,000 Units by mouth daily, Disp-30 capsule, R-0, E-Prescribe      desvenlafaxine (PRISTIQ) 100 MG 24 hr tablet Take 1 tablet (100 mg) by mouth daily, Disp-30 tablet, R-0, E-Prescribe      ferrous sulfate (FEROSUL) 325 (65 Fe) MG tablet Take 1 tablet (325 mg) by mouth daily (with breakfast), Disp-30 tablet, R-0, E-Prescribe      furosemide (LASIX) 20 MG tablet Take 20 mg by mouth daily, Historical      hydroxychloroquine (PLAQUENIL) 200 MG tablet Take 1 tablet (200 mg) by mouth 2 times daily, Disp-30 tablet, R-0, E-Prescribe      ibuprofen (ADVIL/MOTRIN) 600 MG tablet Take 1 tablet (600 mg) by mouth every 8 hours as needed for moderate pain, Disp-24 tablet, R-0, E-Prescribe      medroxyPROGESTERone (PROVERA) 10 MG tablet Take 1 tablet (10 mg) by mouth daily, Disp-10 tablet, R-0, E-Prescribe      metFORMIN (GLUCOPHAGE XR) 500 MG 24 hr tablet Take 1,000 mg by mouth daily (with dinner) Take at 5 pm., Historical      montelukast (SINGULAIR) 10 MG tablet Take 10 mg by mouth every evening, Historical      OLANZapine (ZYPREXA) 2.5 MG tablet Take 2.5 mg by mouth daily as needed (anxiety), Historical      ondansetron (ZOFRAN-ODT) 4 MG ODT tab Take 1 tablet (4 mg) by mouth every 8 hours as needed for nausea, Disp-15 tablet, R-0, E-Prescribe      pregabalin (LYRICA) 100 MG capsule Take 1 capsule (100 mg) by mouth 3 times daily, Disp-90 capsule, R-0, E-Prescribe      Respiratory Therapy Supplies (NEBULIZER) BRENDAN Nebulizer device.  Albuterol nebulization every 2 hours as needed for shortness of breath or wheezing., Disp-1 each, R-0, Local PrintInclude tubing and mask for age please.      SUMAtriptan (IMITREX) 25 MG tablet Take 25 mg by mouth as needed, Historical      valACYclovir (VALTREX) 1000 mg tablet Take 2 tablets by mouth two times daily for one day. Use as needed at onset of cold sore. , Historical           Allergies   Allergies   Allergen Reactions     Amoxicillin-Pot Clavulanate Other (See Comments),  Swelling and Rash     PN: facial swelling, left side. Also had cortisone injection the same day as she started the Augmentin.  Noted in downtime recovery of chart.    PN: facial swelling, left side. Also had cortisone injection the same day as she started the Augmentin.; HUT Comment: PN: facial swelling, left side. Also had cortisone injection the same day as she started the Augmentin.Noted in downtime recovery of chart.; HUT Reaction: Rash; HUT Reaction: Unknown; HUT Reaction Type: Allergy; HUT Severity: Med; HUT Noted: [id number]     Hydrocodone-Acetaminophen Nausea and Vomiting and Rash     Update on 12/12  Pt says she can take tylenol just not the hydrocodone.   Other reaction(s): Rash       Latex Rash     HUT Reaction: Rash; HUT Reaction Type: Allergy; HUT Severity: Low; HUT Noted: [id number]  Other reaction(s): Rash       Oseltamivir Hives     med stopped, PN: med stopped  med stopped, PN: med stopped; HUT Comment: med stopped, PN: med stopped; HUT Reaction: Hives; HUT Reaction Type: Allergy; HUT Severity: Med; HUT Noted: [id number]     Penicillins Anaphylaxis     HUT Reaction: Anaphylaxis; HUT Reaction Type: Allergy; HUT Severity: High; HUT Noted: [id number]     Vancomycin Itching, Swelling and Rash     Other reaction(s): Redness  Flushed face, very itchy; HUT Comment: Flushed face, very itchy; HUT Reaction: Angioedema; HUT Reaction: Redness; HUT Severity: Med; HUT Noted: [id number]    facial     Hydrocodone Nausea and Vomiting and GI Disturbance     vomiting for days, PN: vomiting for days; HUT Comment: vomiting for days; HUT Reaction: Gastrointestinal; HUT Reaction: Nausea And Vomiting; HUT Reaction Type: Intolerance; HUT Severity: Med; HUT Noted: [id number]  vomiting for days       Blood-Group Specific Substance Other (See Comments)     Patient has an anti-Cw and non-specific antibodies. Blood product orders may be delayed. Draw one red top and two purple top tubes for all type/screen/crossmatch orders.  Patient has  anti-Cw, anti-K ([place]), Warm auto and nonspecific antibodies. Blood products may be delayed. Draw patient 24 hours prior to transfusion. Draw one red top and two purple top tubes for all type and screen orders.     Clavulanic Acid Angioedema     Fentanyl Itching     Naltrexone Other (See Comments)     Reaction(s): Vivid dreams.     Other Drug Allergy (See Comments)      See original file MRN [id number]. Files are marked for merge     Oxycodone Swelling     Adhesive Tape Rash     Silicone type     Band-Aid Anti-Itch      Other reaction(s): adhesive allergy     Cephalosporins Rash     Lamotrigine Rash     Possibly associated with Lamictal.   HUT Comment: Possibly associated with Lamictal. ; HUT Reaction: Rash; HUT Reaction Type: Allergy; HUT Severity: Low; HUT Noted: [id number]

## 2023-01-05 ENCOUNTER — APPOINTMENT (OUTPATIENT)
Dept: CT IMAGING | Facility: CLINIC | Age: 33
End: 2023-01-05
Attending: EMERGENCY MEDICINE
Payer: COMMERCIAL

## 2023-01-05 ENCOUNTER — HOSPITAL ENCOUNTER (EMERGENCY)
Facility: CLINIC | Age: 33
Discharge: HOME | End: 2023-01-05
Attending: EMERGENCY MEDICINE | Admitting: EMERGENCY MEDICINE
Payer: COMMERCIAL

## 2023-01-05 VITALS
HEIGHT: 62 IN | HEART RATE: 99 BPM | SYSTOLIC BLOOD PRESSURE: 114 MMHG | OXYGEN SATURATION: 93 % | BODY MASS INDEX: 53.92 KG/M2 | DIASTOLIC BLOOD PRESSURE: 55 MMHG | WEIGHT: 293 LBS | RESPIRATION RATE: 18 BRPM | TEMPERATURE: 98 F

## 2023-01-05 DIAGNOSIS — R10.12 LUQ ABDOMINAL PAIN: ICD-10-CM

## 2023-01-05 DIAGNOSIS — M54.50 ACUTE BILATERAL LOW BACK PAIN WITHOUT SCIATICA: ICD-10-CM

## 2023-01-05 LAB
ALBUMIN SERPL-MCNC: 3.6 G/DL (ref 3.5–5)
ALBUMIN UR-MCNC: 30 MG/DL
ALP SERPL-CCNC: 70 U/L (ref 45–120)
ALT SERPL W P-5'-P-CCNC: 30 U/L (ref 0–45)
ANION GAP SERPL CALCULATED.3IONS-SCNC: 8 MMOL/L (ref 5–18)
APPEARANCE UR: CLEAR
AST SERPL W P-5'-P-CCNC: 24 U/L (ref 0–40)
BASOPHILS # BLD AUTO: 0.1 10E3/UL (ref 0–0.2)
BASOPHILS NFR BLD AUTO: 1 %
BILIRUB DIRECT SERPL-MCNC: <0.1 MG/DL
BILIRUB SERPL-MCNC: 0.2 MG/DL (ref 0–1)
BILIRUB UR QL STRIP: NEGATIVE
BUN SERPL-MCNC: 10 MG/DL (ref 8–22)
CALCIUM SERPL-MCNC: 9.1 MG/DL (ref 8.5–10.5)
CHLORIDE BLD-SCNC: 107 MMOL/L (ref 98–107)
CO2 SERPL-SCNC: 28 MMOL/L (ref 22–31)
COLOR UR AUTO: YELLOW
CREAT SERPL-MCNC: 0.72 MG/DL (ref 0.6–1.1)
EOSINOPHIL # BLD AUTO: 0.3 10E3/UL (ref 0–0.7)
EOSINOPHIL NFR BLD AUTO: 3 %
ERYTHROCYTE [DISTWIDTH] IN BLOOD BY AUTOMATED COUNT: 13.4 % (ref 10–15)
GFR SERPL CREATININE-BSD FRML MDRD: >90 ML/MIN/1.73M2
GLUCOSE BLD-MCNC: 171 MG/DL (ref 70–125)
GLUCOSE UR STRIP-MCNC: NEGATIVE MG/DL
HCT VFR BLD AUTO: 37.8 % (ref 35–47)
HGB BLD-MCNC: 12.3 G/DL (ref 11.7–15.7)
HGB UR QL STRIP: NEGATIVE
IMM GRANULOCYTES # BLD: 0 10E3/UL
IMM GRANULOCYTES NFR BLD: 0 %
KETONES UR STRIP-MCNC: NEGATIVE MG/DL
LEUKOCYTE ESTERASE UR QL STRIP: NEGATIVE
LIPASE SERPL-CCNC: 26 U/L (ref 0–52)
LYMPHOCYTES # BLD AUTO: 1.9 10E3/UL (ref 0.8–5.3)
LYMPHOCYTES NFR BLD AUTO: 24 %
MCH RBC QN AUTO: 28.2 PG (ref 26.5–33)
MCHC RBC AUTO-ENTMCNC: 32.5 G/DL (ref 31.5–36.5)
MCV RBC AUTO: 87 FL (ref 78–100)
MONOCYTES # BLD AUTO: 0.6 10E3/UL (ref 0–1.3)
MONOCYTES NFR BLD AUTO: 7 %
MUCOUS THREADS #/AREA URNS LPF: PRESENT /LPF
NEUTROPHILS # BLD AUTO: 5.1 10E3/UL (ref 1.6–8.3)
NEUTROPHILS NFR BLD AUTO: 65 %
NITRATE UR QL: NEGATIVE
NRBC # BLD AUTO: 0 10E3/UL
NRBC BLD AUTO-RTO: 0 /100
PH UR STRIP: 5.5 [PH] (ref 5–7)
PLATELET # BLD AUTO: 273 10E3/UL (ref 150–450)
POTASSIUM BLD-SCNC: 3.5 MMOL/L (ref 3.5–5)
PROT SERPL-MCNC: 6.6 G/DL (ref 6–8)
RBC # BLD AUTO: 4.36 10E6/UL (ref 3.8–5.2)
RBC URINE: 1 /HPF
SODIUM SERPL-SCNC: 143 MMOL/L (ref 136–145)
SP GR UR STRIP: 1.03 (ref 1–1.03)
SQUAMOUS EPITHELIAL: 1 /HPF
UROBILINOGEN UR STRIP-MCNC: <2 MG/DL
WBC # BLD AUTO: 8 10E3/UL (ref 4–11)
WBC URINE: 2 /HPF

## 2023-01-05 PROCEDURE — 85004 AUTOMATED DIFF WBC COUNT: CPT | Performed by: EMERGENCY MEDICINE

## 2023-01-05 PROCEDURE — 258N000003 HC RX IP 258 OP 636: Performed by: EMERGENCY MEDICINE

## 2023-01-05 PROCEDURE — 96376 TX/PRO/DX INJ SAME DRUG ADON: CPT

## 2023-01-05 PROCEDURE — 74176 CT ABD & PELVIS W/O CONTRAST: CPT

## 2023-01-05 PROCEDURE — 82248 BILIRUBIN DIRECT: CPT | Performed by: EMERGENCY MEDICINE

## 2023-01-05 PROCEDURE — 96361 HYDRATE IV INFUSION ADD-ON: CPT

## 2023-01-05 PROCEDURE — 81001 URINALYSIS AUTO W/SCOPE: CPT | Performed by: EMERGENCY MEDICINE

## 2023-01-05 PROCEDURE — 96374 THER/PROPH/DIAG INJ IV PUSH: CPT

## 2023-01-05 PROCEDURE — 250N000011 HC RX IP 250 OP 636: Performed by: EMERGENCY MEDICINE

## 2023-01-05 PROCEDURE — 96375 TX/PRO/DX INJ NEW DRUG ADDON: CPT

## 2023-01-05 PROCEDURE — 36415 COLL VENOUS BLD VENIPUNCTURE: CPT | Performed by: EMERGENCY MEDICINE

## 2023-01-05 PROCEDURE — 99285 EMERGENCY DEPT VISIT HI MDM: CPT | Mod: 25

## 2023-01-05 PROCEDURE — 80053 COMPREHEN METABOLIC PANEL: CPT | Performed by: EMERGENCY MEDICINE

## 2023-01-05 PROCEDURE — 83690 ASSAY OF LIPASE: CPT | Performed by: EMERGENCY MEDICINE

## 2023-01-05 RX ORDER — ONDANSETRON 2 MG/ML
4 INJECTION INTRAMUSCULAR; INTRAVENOUS ONCE
Status: COMPLETED | OUTPATIENT
Start: 2023-01-05 | End: 2023-01-05

## 2023-01-05 RX ORDER — METHOCARBAMOL 500 MG/1
500 TABLET, FILM COATED ORAL 4 TIMES DAILY PRN
Qty: 30 TABLET | Refills: 0 | Status: SHIPPED | OUTPATIENT
Start: 2023-01-05 | End: 2023-02-10

## 2023-01-05 RX ORDER — HYDROMORPHONE HYDROCHLORIDE 1 MG/ML
0.5 INJECTION, SOLUTION INTRAMUSCULAR; INTRAVENOUS; SUBCUTANEOUS ONCE
Status: COMPLETED | OUTPATIENT
Start: 2023-01-05 | End: 2023-01-05

## 2023-01-05 RX ADMIN — HYDROMORPHONE HYDROCHLORIDE 0.5 MG: 1 INJECTION, SOLUTION INTRAMUSCULAR; INTRAVENOUS; SUBCUTANEOUS at 18:44

## 2023-01-05 RX ADMIN — HYDROMORPHONE HYDROCHLORIDE 1 MG: 1 INJECTION, SOLUTION INTRAMUSCULAR; INTRAVENOUS; SUBCUTANEOUS at 19:47

## 2023-01-05 RX ADMIN — SODIUM CHLORIDE 1000 ML: 9 INJECTION, SOLUTION INTRAVENOUS at 19:18

## 2023-01-05 RX ADMIN — ONDANSETRON 4 MG: 2 INJECTION INTRAMUSCULAR; INTRAVENOUS at 19:17

## 2023-01-05 ASSESSMENT — ACTIVITIES OF DAILY LIVING (ADL)
ADLS_ACUITY_SCORE: 40
ADLS_ACUITY_SCORE: 40

## 2023-01-05 NOTE — ED TRIAGE NOTES
Patient reports abdominal pain x 4 days. Pain started on the RLQ, and is now on both sides of her abdomen. Patient also states that she has lower back pain. Reports nausea and diarrhea (diarrhea is baseline)      Triage Assessment     Row Name 01/05/23 [id number]       Triage Assessment (Adult)    Airway WDL WDL       Respiratory WDL    Respiratory WDL WDL       Skin Circulation/Temperature WDL    Skin Circulation/Temperature WDL WDL       Cardiac WDL    Cardiac WDL WDL       Peripheral/Neurovascular WDL    Peripheral Neurovascular WDL WDL       Cognitive/Neuro/Behavioral WDL    Cognitive/Neuro/Behavioral WDL WDL

## 2023-01-06 NOTE — ED NOTES
Pt requesting lemon lime soda. Spoke with provider. Can of lemon lime soda given with tab removed from can and aide monitoring pt.

## 2023-01-06 NOTE — ED PROVIDER NOTES
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     brexpiprazole (REXULTI) 0.5 MG tablet     busPIRone (BUSPAR) 10 MG tablet     cetirizine (ZYRTEC) 10 MG tablet     Cholecalciferol (VITAMIN D) 50 MCG (2000 UT) CAPS     desvenlafaxine (PRISTIQ) 100 MG 24 hr tablet     ferrous sulfate (FEROSUL) 325 (65 Fe) MG tablet     furosemide (LASIX) 20 MG tablet      hydroxychloroquine (PLAQUENIL) 200 MG tablet     ibuprofen (ADVIL/MOTRIN) 600 MG tablet     medroxyPROGESTERone (PROVERA) 10 MG tablet     metFORMIN (GLUCOPHAGE XR) 500 MG 24 hr tablet     montelukast (SINGULAIR) 10 MG tablet     OLANZapine (ZYPREXA) 2.5 MG tablet     ondansetron (ZOFRAN-ODT) 4 MG ODT tab     pregabalin (LYRICA) 100 MG capsule     Respiratory Therapy Supplies (NEBULIZER) BRENDAN     SUMAtriptan (IMITREX) 25 MG tablet     valACYclovir (VALTREX) 1000 mg tablet         ALLERGIES:  Allergies   Allergen Reactions     Amoxicillin-Pot Clavulanate Other (See Comments), Swelling and Rash     PN: facial swelling, left side. Also had cortisone injection the same day as she started the Augmentin.  Noted in downtime recovery of chart.    PN: facial swelling, left side. Also had cortisone injection the same day as she started the Augmentin.; HUT Comment: PN: facial swelling, left side. Also had cortisone injection the same day as she started the Augmentin.Noted in downtime recovery of chart.; HUT Reaction: Rash; HUT Reaction: Unknown; HUT Reaction Type: Allergy; HUT Severity: Med; HUT Noted: [id number]     Hydrocodone-Acetaminophen Nausea and Vomiting and Rash     Update on 12/12  Pt says she can take tylenol just not the hydrocodone.   Other reaction(s): Rash       Latex Rash     HUT Reaction: Rash; HUT Reaction Type: Allergy; HUT Severity: Low; HUT Noted: [id number]  Other reaction(s): Rash       Oseltamivir Hives     med stopped, PN: med stopped  med stopped, PN: med stopped; HUT Comment: med stopped, PN: med stopped; HUT Reaction: Hives; HUT Reaction Type: Allergy; HUT Severity: Med; HUT Noted: [id number]     Penicillins Anaphylaxis     HUT Reaction: Anaphylaxis; HUT Reaction Type: Allergy; HUT Severity: High; HUT Noted: [id number]     Vancomycin Itching, Swelling and Rash     Other reaction(s): Redness  Flushed face, very itchy; HUT Comment: Flushed face, very itchy; HUT Reaction: Angioedema; HUT Reaction: Redness; HUT  Severity: Med; HUT Noted: [id number]    facial     Hydrocodone Nausea and Vomiting and GI Disturbance     vomiting for days, PN: vomiting for days; HUT Comment: vomiting for days; HUT Reaction: Gastrointestinal; HUT Reaction: Nausea And Vomiting; HUT Reaction Type: Intolerance; HUT Severity: Med; HUT Noted: [id number]  vomiting for days       Blood-Group Specific Substance Other (See Comments)     Patient has an anti-Cw and non-specific antibodies. Blood product orders may be delayed. Draw one red top and two purple top tubes for all type/screen/crossmatch orders.  Patient has anti-Cw, anti-K ([place]), Warm auto and nonspecific antibodies. Blood products may be delayed. Draw patient 24 hours prior to transfusion. Draw one red top and two purple top tubes for all type and screen orders.     Clavulanic Acid Angioedema     Fentanyl Itching     Naltrexone Other (See Comments)     Reaction(s): Vivid dreams.     Other Drug Allergy (See Comments)      See original file MRN [id number]. Files are marked for merge     Oxycodone Swelling     Adhesive Tape Rash     Silicone type     Band-Aid Anti-Itch      Other reaction(s): adhesive allergy     Cephalosporins Rash     Lamotrigine Rash     Possibly associated with Lamictal.   HUT Comment: Possibly associated with Lamictal. ; HUT Reaction: Rash; HUT Reaction Type: Allergy; HUT Severity: Low; HUT Noted: [id number]       FAMILY HISTORY:  Family History   Problem Relation Age of Onset     Diabetes Type 2  Maternal Grandmother      Diabetes Type 2  Paternal Grandmother      Breast Cancer Paternal Grandmother      Cerebrovascular Disease Father 53     Myocardial Infarction No family hx of      Coronary Artery Disease Early Onset No family hx of      Ovarian Cancer No family hx of      Colon Cancer No family hx of      Depression Mother      Anxiety Disorder Mother        SOCIAL HISTORY:  Social History     Socioeconomic History     Marital status: Single   Occupational History      "Occupation: On disability   Tobacco Use     Smoking status: Never     Smokeless tobacco: Never   Substance and Sexual Activity     Alcohol use: No     Alcohol/week: 0.0 standard drinks     Drug use: No     Sexual activity: Not Currently     Partners: Male     Birth control/protection: I.U.D.     Comment: IUD - Mirena - placed July, 2015   Social History Narrative    Single.    Living in independent living portion of [employer] [facility].    On disability.    No regular exercise.        VITALS:  Patient Vitals for the past 24 hrs:   BP Temp Temp src Pulse Resp SpO2 Height Weight   01/05/23 2007 -- -- -- 102 -- 95 % -- --   01/05/23 2000 114/55 -- -- 101 -- 92 % -- --   01/05/23 1953 116/58 -- -- 101 18 95 % -- --   01/05/23 1733 132/83 98  F (36.7  C) Oral 105 22 96 % 1.575 m (5' 2\") 136.1 kg (300 lb)       PHYSICAL EXAM    VITAL SIGNS: /55   Pulse 102   Temp 98  F (36.7  C) (Oral)   Resp 18   Ht 1.575 m (5' 2\")   Wt 136.1 kg (300 lb)   LMP 12/01/2022   SpO2 95%   BMI 54.87 kg/m      General Appearance: Well-appearing, well-nourished, no acute distress  Head:  Normocephalic, without obvious abnormality, atraumatic  Eyes:  PERRL, conjunctiva/corneas clear, EOM's intact,  ENT:  Lips, mucosa, and tongue normal, membranes are moist without pallor  Neck:  Normal ROM, symmetrical, trachea midline    Cardio:  Regular rate and rhythm, no murmur, rub or gallop, 2+ pulses symmetric in all extremities  Pulm:  Clear to auscultation bilaterally, respirations unlabored,  Back:  ROM normal, mild left CVA tenderness, no spinal tenderness, bilateral paraspinal lumbar tenderness  Abdomen:  Soft, left sided tenderness, no rebound or guarding.  Musculoskeletal: Full ROM, no edema, no cyanosis, good ROM of major joints  Integument:  Warm, Dry, No erythema, No rash.    Neurologic:  Alert & oriented.  No focal deficits appreciated.  Ambulatory.  Psychiatric:  Affect normal, Judgment normal, Mood normal.      LABS  Results for " orders placed or performed during the hospital encounter of 01/05/23 (from the past 24 hour(s))   CBC with platelets + differential    Narrative    The following orders were created for panel order CBC with platelets + differential.  Procedure                               Abnormality         Status                     ---------                               -----------         ------                     CBC with platelets and d...[[id number]]                      Final result                 Please view results for these tests on the individual orders.   Basic metabolic panel   Result Value Ref Range    Sodium 143 136 - 145 mmol/L    Potassium 3.5 3.5 - 5.0 mmol/L    Chloride 107 98 - 107 mmol/L    Carbon Dioxide (CO2) 28 22 - 31 mmol/L    Anion Gap 8 5 - 18 mmol/L    Urea Nitrogen 10 8 - 22 mg/dL    Creatinine 0.72 0.60 - 1.10 mg/dL    Calcium 9.1 8.5 - 10.5 mg/dL    Glucose 171 (H) 70 - 125 mg/dL    GFR Estimate >90 >60 mL/min/1.73m2   Hepatic function panel   Result Value Ref Range    Bilirubin Total 0.2 0.0 - 1.0 mg/dL    Bilirubin Direct <0.1 <=0.5 mg/dL    Protein Total 6.6 6.0 - 8.0 g/dL    Albumin 3.6 3.5 - 5.0 g/dL    Alkaline Phosphatase 70 45 - 120 U/L    AST 24 0 - 40 U/L    ALT 30 0 - 45 U/L   Lipase   Result Value Ref Range    Lipase 26 0 - 52 U/L   CBC with platelets and differential   Result Value Ref Range    WBC Count 8.0 4.0 - 11.0 10e3/uL    RBC Count 4.36 3.80 - 5.20 10e6/uL    Hemoglobin 12.3 11.7 - 15.7 g/dL    Hematocrit 37.8 35.0 - 47.0 %    MCV 87 78 - 100 fL    MCH 28.2 26.5 - 33.0 pg    MCHC 32.5 31.5 - 36.5 g/dL    RDW 13.4 10.0 - 15.0 %    Platelet Count 273 150 - 450 10e3/uL    % Neutrophils 65 %    % Lymphocytes 24 %    % Monocytes 7 %    % Eosinophils 3 %    % Basophils 1 %    % Immature Granulocytes 0 %    NRBCs per 100 WBC 0 <1 /100    Absolute Neutrophils 5.1 1.6 - 8.3 10e3/uL    Absolute Lymphocytes 1.9 0.8 - 5.3 10e3/uL    Absolute Monocytes 0.6 0.0 - 1.3 10e3/uL    Absolute  Eosinophils 0.3 0.0 - 0.7 10e3/uL    Absolute Basophils 0.1 0.0 - 0.2 10e3/uL    Absolute Immature Granulocytes 0.0 <=0.4 10e3/uL    Absolute NRBCs 0.0 10e3/uL   Abd/pelvis CT no contrast - Stone Protocol    Narrative    EXAM: CT ABDOMEN PELVIS W/O CONTRAST  LOCATION: [facility]  DATE/TIME: 1/5/2023 7:41 PM    INDICATION: Left abdominal flank pain  COMPARISON: 11/29/2022  TECHNIQUE: CT scan of the abdomen and pelvis was performed without IV contrast. Multiplanar reformats were obtained. Dose reduction techniques were used.  CONTRAST: None.    FINDINGS:   LOWER CHEST: Normal.    HEPATOBILIARY: Hepatic steatosis. Cholecystectomy.    PANCREAS: Normal.    SPLEEN: Normal.    ADRENAL GLANDS: Normal.    KIDNEYS/BLADDER: No calculi, hydronephrosis or perinephric stranding in either kidney. No calculi in the ureters. No masses evident on noncontrast CT. Nearly empty urinary bladder without calculi.    BOWEL: No small bowel or colonic obstruction or inflammatory changes. Appendectomy.    LYMPH NODES: Stable 1.0 cm right external iliac lymph node and 0.9 cm left external iliac lymph node, likely reactive. No new or enlarging lymph nodes.    VASCULATURE: Unremarkable.    PELVIC ORGANS: No pelvic or adnexal masses. No free fluid or fluid collections. No extraluminal air.    MUSCULOSKELETAL: Unremarkable.      Impression    IMPRESSION:   1.  No acute findings in the abdomen and pelvis.  2.  Hepatic steatosis.     UA with Microscopic reflex to Culture    Specimen: Urine, Midstream   Result Value Ref Range    Color Urine Yellow Colorless, Straw, Light Yellow, Yellow    Appearance Urine Clear Clear    Glucose Urine Negative Negative mg/dL    Bilirubin Urine Negative Negative    Ketones Urine Negative Negative mg/dL    Specific Gravity Urine 1.032 (H) 1.001 - 1.030    Blood Urine Negative Negative    pH Urine 5.5 5.0 - 7.0    Protein Albumin Urine 30 (A) Negative mg/dL    Urobilinogen Urine <2.0 <2.0 mg/dL     Nitrite Urine Negative Negative    Leukocyte Esterase Urine Negative Negative    Mucus Urine Present (A) None Seen /LPF    RBC Urine 1 <=2 /HPF    WBC Urine 2 <=5 /HPF    Squamous Epithelials Urine 1 <=1 /HPF    Narrative    Urine Culture not indicated     *Note: Due to a large number of results and/or encounters for the requested time period, some results have not been displayed. A complete set of results can be found in Results Review.         RADIOLOGY  Abd/pelvis CT no contrast - Stone Protocol   Final Result   IMPRESSION:    1.  No acute findings in the abdomen and pelvis.   2.  Hepatic steatosis.               MEDICATIONS GIVEN IN THE EMERGENCY:  Medications   0.9% sodium chloride BOLUS (1,000 mLs Intravenous New Bag 1/5/23 1918)   HYDROmorphone (PF) (DILAUDID) injection 0.5 mg (0.5 mg Intravenous Given 1/5/23 1844)   ondansetron (ZOFRAN) injection 4 mg (4 mg Intravenous Given 1/5/23 1917)   HYDROmorphone (DILAUDID) injection 1 mg (1 mg Intravenous Given 1/5/23 1947)       NEW PRESCRIPTIONS STARTED AT TODAY'S ER VISIT  New Prescriptions    METHOCARBAMOL (ROBAXIN) 500 MG TABLET    Take 1 tablet (500 mg) by mouth 4 times daily as needed        I, Ray Hall, am serving as a scribe to document services personally performed by Brice Goncalves D.O., based on my observations and the provider's statements to me.  I, Brice Goncalves D.O., attest that Ray Hall is acting in a scribe capacity, has observed my performance of the services and has documented them in accordance with my direction.     Brice Goncalves D.O.  Emergency Medicine  [facility] EMERGENCY ROOM  [address] [address]  [phone]  Dept: [phone]       Brice Goncalves DO  01/05/23 2130

## 2023-01-11 ENCOUNTER — APPOINTMENT (OUTPATIENT)
Dept: RADIOLOGY | Facility: CLINIC | Age: 33
End: 2023-01-11
Attending: EMERGENCY MEDICINE
Payer: COMMERCIAL

## 2023-01-11 ENCOUNTER — HOSPITAL ENCOUNTER (EMERGENCY)
Facility: CLINIC | Age: 33
Discharge: NURSING, REHAB OR LONG-TERM CARE | End: 2023-01-11
Attending: EMERGENCY MEDICINE | Admitting: EMERGENCY MEDICINE
Payer: COMMERCIAL

## 2023-01-11 VITALS
HEIGHT: 62 IN | HEART RATE: 105 BPM | SYSTOLIC BLOOD PRESSURE: 154 MMHG | BODY MASS INDEX: 53.92 KG/M2 | OXYGEN SATURATION: 95 % | RESPIRATION RATE: 19 BRPM | WEIGHT: 293 LBS | DIASTOLIC BLOOD PRESSURE: 77 MMHG | TEMPERATURE: 98.4 F

## 2023-01-11 DIAGNOSIS — S76.911A MUSCLE STRAIN OF THIGH, RIGHT, INITIAL ENCOUNTER: ICD-10-CM

## 2023-01-11 PROCEDURE — 99284 EMERGENCY DEPT VISIT MOD MDM: CPT

## 2023-01-11 PROCEDURE — 73552 X-RAY EXAM OF FEMUR 2/>: CPT | Mod: RT

## 2023-01-11 PROCEDURE — 250N000013 HC RX MED GY IP 250 OP 250 PS 637: Performed by: EMERGENCY MEDICINE

## 2023-01-11 PROCEDURE — 73560 X-RAY EXAM OF KNEE 1 OR 2: CPT | Mod: RT

## 2023-01-11 RX ADMIN — HYDROMORPHONE HYDROCHLORIDE 1 MG: 2 TABLET ORAL at 19:47

## 2023-01-11 ASSESSMENT — ACTIVITIES OF DAILY LIVING (ADL): ADLS_ACUITY_SCORE: 40

## 2023-01-12 NOTE — ED TRIAGE NOTES
Pt arrives via EMS for right knee/hip pain after she slipped and fell on a bath mat. Group home pt.    Triage Assessment     Row Name 01/11/23 1928       Triage Assessment (Adult)    Airway WDL WDL       Respiratory WDL    Respiratory WDL WDL       Skin Circulation/Temperature WDL    Skin Circulation/Temperature WDL WDL       Peripheral/Neurovascular WDL    Peripheral Neurovascular WDL WDL       Cognitive/Neuro/Behavioral WDL    Cognitive/Neuro/Behavioral WDL WDL

## 2023-01-12 NOTE — DISCHARGE INSTRUCTIONS
Expect baseline achy for the next few days.  Take Tylenol as needed.  Continue the ice the area of greatest discomfort for the next few days.

## 2023-01-12 NOTE — ED NOTES
Returned from imaging. No observed or reported distress. Continue to assist as needed. Anticipate discharge.

## 2023-01-12 NOTE — ED PROVIDER NOTES
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 (MAALOX MAX) 400-400-40 MG/5ML SUSP suspension, Take 15 mLs by mouth every 6 hours as needed for heartburn or other (sore throat), Disp: 355 mL, Rfl: 0     brexpiprazole (REXULTI) 0.5 MG tablet, Take 2 mg by mouth daily ., Disp: , Rfl:      busPIRone (BUSPAR) 10 MG tablet, Take 2 tablets (20 mg) by mouth 3 times daily, Disp: 180 tablet, Rfl: 0      cetirizine (ZYRTEC) 10 MG tablet, Take 1 tablet (10 mg) by mouth daily (Patient taking differently: Take 10 mg by mouth At Bedtime), Disp: 30 tablet, Rfl: 0     Cholecalciferol (VITAMIN D) 50 MCG (2000 UT) CAPS, Take 2,000 Units by mouth daily, Disp: 30 capsule, Rfl: 0     desvenlafaxine (PRISTIQ) 100 MG 24 hr tablet, Take 1 tablet (100 mg) by mouth daily, Disp: 30 tablet, Rfl: 0     ferrous sulfate (FEROSUL) 325 (65 Fe) MG tablet, Take 1 tablet (325 mg) by mouth daily (with breakfast), Disp: 30 tablet, Rfl: 0     furosemide (LASIX) 20 MG tablet, Take 20 mg by mouth daily, Disp: , Rfl:      hydroxychloroquine (PLAQUENIL) 200 MG tablet, Take 1 tablet (200 mg) by mouth 2 times daily, Disp: 30 tablet, Rfl: 0     ibuprofen (ADVIL/MOTRIN) 600 MG tablet, Take 1 tablet (600 mg) by mouth every 8 hours as needed for moderate pain, Disp: 24 tablet, Rfl: 0     medroxyPROGESTERone (PROVERA) 10 MG tablet, Take 1 tablet (10 mg) by mouth daily, Disp: 10 tablet, Rfl: 0     metFORMIN (GLUCOPHAGE XR) 500 MG 24 hr tablet, Take 1,000 mg by mouth daily (with dinner) Take at 5 pm., Disp: , Rfl:      methocarbamol (ROBAXIN) 500 MG tablet, Take 1 tablet (500 mg) by mouth 4 times daily as needed, Disp: 30 tablet, Rfl: 0     montelukast (SINGULAIR) 10 MG tablet, Take 10 mg by mouth every evening, Disp: , Rfl:      OLANZapine (ZYPREXA) 2.5 MG tablet, Take 2.5 mg by mouth daily as needed (anxiety), Disp: , Rfl:      ondansetron (ZOFRAN-ODT) 4 MG ODT tab, Take 1 tablet (4 mg) by mouth every 8 hours as needed for nausea, Disp: 15 tablet, Rfl: 0     pregabalin (LYRICA) 100 MG capsule, Take 1 capsule (100 mg) by mouth 3 times daily, Disp: 90 capsule, Rfl: 0     Respiratory Therapy Supplies (NEBULIZER) BRENDAN, Nebulizer device.  Albuterol nebulization every 2 hours as needed for shortness of breath or wheezing., Disp: 1 each, Rfl: 0     SUMAtriptan (IMITREX) 25 MG tablet, Take 25 mg by mouth as needed, Disp: , Rfl:      valACYclovir (VALTREX) 1000 mg  tablet, Take 2 tablets by mouth two times daily for one day. Use as needed at onset of cold sore. , Disp: , Rfl:     ALLERGIES:  Allergies   Allergen Reactions     Amoxicillin-Pot Clavulanate Other (See Comments), Swelling and Rash     PN: facial swelling, left side. Also had cortisone injection the same day as she started the Augmentin.  Noted in downtime recovery of chart.    PN: facial swelling, left side. Also had cortisone injection the same day as she started the Augmentin.; HUT Comment: PN: facial swelling, left side. Also had cortisone injection the same day as she started the Augmentin.Noted in downtime recovery of chart.; HUT Reaction: Rash; HUT Reaction: Unknown; HUT Reaction Type: Allergy; HUT Severity: Med; HUT Noted: [id number]     Hydrocodone-Acetaminophen Nausea and Vomiting and Rash     Update on 12/12  Pt says she can take tylenol just not the hydrocodone.   Other reaction(s): Rash       Latex Rash     HUT Reaction: Rash; HUT Reaction Type: Allergy; HUT Severity: Low; HUT Noted: [id number]  Other reaction(s): Rash       Oseltamivir Hives     med stopped, PN: med stopped  med stopped, PN: med stopped; HUT Comment: med stopped, PN: med stopped; HUT Reaction: Hives; HUT Reaction Type: Allergy; HUT Severity: Med; HUT Noted: [id number]     Penicillins Anaphylaxis     HUT Reaction: Anaphylaxis; HUT Reaction Type: Allergy; HUT Severity: High; HUT Noted: [id number]     Vancomycin Itching, Swelling and Rash     Other reaction(s): Redness  Flushed face, very itchy; HUT Comment: Flushed face, very itchy; HUT Reaction: Angioedema; HUT Reaction: Redness; HUT Severity: Med; HUT Noted: [id number]    facial     Hydrocodone Nausea and Vomiting and GI Disturbance     vomiting for days, PN: vomiting for days; HUT Comment: vomiting for days; HUT Reaction: Gastrointestinal; HUT Reaction: Nausea And Vomiting; HUT Reaction Type: Intolerance; HUT Severity: Med; HUT Noted: [id number]  vomiting for days       Blood-Group Specific  Substance Other (See Comments)     Patient has an anti-Cw and non-specific antibodies. Blood product orders may be delayed. Draw one red top and two purple top tubes for all type/screen/crossmatch orders.  Patient has anti-Cw, anti-K ([place]), Warm auto and nonspecific antibodies. Blood products may be delayed. Draw patient 24 hours prior to transfusion. Draw one red top and two purple top tubes for all type and screen orders.     Clavulanic Acid Angioedema     Fentanyl Itching     Naltrexone Other (See Comments)     Reaction(s): Vivid dreams.     Other Drug Allergy (See Comments)      See original file MRN [id number]. Files are marked for merge     Oxycodone Swelling     Adhesive Tape Rash     Silicone type     Band-Aid Anti-Itch      Other reaction(s): adhesive allergy     Cephalosporins Rash     Lamotrigine Rash     Possibly associated with Lamictal.   HUT Comment: Possibly associated with Lamictal. ; HUT Reaction: Rash; HUT Reaction Type: Allergy; HUT Severity: Low; HUT Noted: [id number]       FAMILY HISTORY:  Family History   Problem Relation Age of Onset     Diabetes Type 2  Maternal Grandmother      Diabetes Type 2  Paternal Grandmother      Breast Cancer Paternal Grandmother      Cerebrovascular Disease Father 53     Myocardial Infarction No family hx of      Coronary Artery Disease Early Onset No family hx of      Ovarian Cancer No family hx of      Colon Cancer No family hx of      Depression Mother      Anxiety Disorder Mother        SOCIAL HISTORY:   Social History     Socioeconomic History     Marital status: Single   Occupational History     Occupation: On disability   Tobacco Use     Smoking status: Never     Smokeless tobacco: Never   Substance and Sexual Activity     Alcohol use: No     Alcohol/week: 0.0 standard drinks     Drug use: No     Sexual activity: Not Currently     Partners: Male     Birth control/protection: I.U.D.     Comment: IUD - Mirena - placed July, 2015   Social History Narrative     "Single.    Living in independent living portion of [employer].    On disability.    No regular exercise.        VITALS:  Patient Vitals for the past 24 hrs:   BP Temp Pulse Resp SpO2 Height Weight   01/11/23 1927 (!) 154/77 98.4  F (36.9  C) 105 19 95 % 1.575 m (5' 2\") 136.1 kg (300 lb)        PHYSICAL EXAM    Constitutional:  Awake, alert, obese female in mild distress  HENT:  Normocephalic, Atraumatic. Bilateral external ears normal. Oropharynx moist. Nose normal. Neck- Normal range of motion with no guarding, No midline cervical tenderness, Supple, No stridor.   Eyes:  PERRL, EOMI with no signs of entrapment, Conjunctiva normal, No discharge.   Musculoskeletal:  Intact distal pulses, No edema. Diffuse tenderness to right knee. Difficult to appreciate soft tissue swelling due to obesity. No major deformities noted.  Integument:  Warm, Dry, No erythema, No rash.   Neurologic:  Alert & oriented, Normal motor function, Normal sensory function, No focal deficits noted.   Psychiatric:  Affect normal, Judgment normal, Mood normal.     Exam limited due to being performed in [facility].    LAB:  All pertinent labs reviewed and interpreted.  Results for orders placed or performed during the hospital encounter of 01/11/23   XR Femur Right 2 Views    Impression    IMPRESSION: No fractures are evident.   XR Knee Right 1/2 Views    Impression    IMPRESSION: No fractures are evident. No knee joint effusion.       RADIOLOGY:  Reviewed all pertinent imaging. Please see official radiology report.  XR Knee Right 1/2 Views   Final Result   IMPRESSION: No fractures are evident. No knee joint effusion.      XR Femur Right 2 Views   Final Result   IMPRESSION: No fractures are evident.            I, Sudhir Simeon, am serving as a scribe to document services personally performed by Bird Valles MD, based on my observation and the provider's statements to me. I, Bird Valles MD attest that Sudhir Simeon is acting in a scribe " capacity, has observed my performance of the services and has documented them in accordance with my direction.    Bird Valles M.D.  Emergency Medicine  [facility] EMERGENCY ROOM     Bird Valles MD  01/11/23 2129

## 2023-01-15 ENCOUNTER — APPOINTMENT (OUTPATIENT)
Dept: RADIOLOGY | Facility: CLINIC | Age: 33
End: 2023-01-15
Attending: FAMILY MEDICINE
Payer: COMMERCIAL

## 2023-01-15 ENCOUNTER — HOSPITAL ENCOUNTER (EMERGENCY)
Facility: CLINIC | Age: 33
Discharge: NURSING, REHAB OR LONG-TERM CARE | End: 2023-01-15
Attending: FAMILY MEDICINE | Admitting: FAMILY MEDICINE
Payer: COMMERCIAL

## 2023-01-15 ENCOUNTER — APPOINTMENT (OUTPATIENT)
Dept: CT IMAGING | Facility: CLINIC | Age: 33
End: 2023-01-15
Attending: FAMILY MEDICINE
Payer: COMMERCIAL

## 2023-01-15 VITALS
BODY MASS INDEX: 53.92 KG/M2 | HEART RATE: 99 BPM | RESPIRATION RATE: 18 BRPM | HEIGHT: 62 IN | DIASTOLIC BLOOD PRESSURE: 77 MMHG | OXYGEN SATURATION: 98 % | WEIGHT: 293 LBS | SYSTOLIC BLOOD PRESSURE: 153 MMHG

## 2023-01-15 DIAGNOSIS — R42 VERTIGO: ICD-10-CM

## 2023-01-15 LAB
ANION GAP SERPL CALCULATED.3IONS-SCNC: 12 MMOL/L (ref 5–18)
APTT PPP: 24 SECONDS (ref 22–38)
BASOPHILS # BLD AUTO: 0 10E3/UL (ref 0–0.2)
BASOPHILS NFR BLD AUTO: 0 %
BUN SERPL-MCNC: 9 MG/DL (ref 8–22)
CALCIUM SERPL-MCNC: 9.4 MG/DL (ref 8.5–10.5)
CHLORIDE BLD-SCNC: 103 MMOL/L (ref 98–107)
CO2 SERPL-SCNC: 24 MMOL/L (ref 22–31)
CREAT SERPL-MCNC: 0.65 MG/DL (ref 0.6–1.1)
EOSINOPHIL # BLD AUTO: 0.4 10E3/UL (ref 0–0.7)
EOSINOPHIL NFR BLD AUTO: 4 %
ERYTHROCYTE [DISTWIDTH] IN BLOOD BY AUTOMATED COUNT: 13.3 % (ref 10–15)
GFR SERPL CREATININE-BSD FRML MDRD: >90 ML/MIN/1.73M2
GLUCOSE BLD-MCNC: 158 MG/DL (ref 70–125)
GLUCOSE BLDC GLUCOMTR-MCNC: 177 MG/DL (ref 70–99)
HCT VFR BLD AUTO: 39.4 % (ref 35–47)
HGB BLD-MCNC: 12.6 G/DL (ref 11.7–15.7)
IMM GRANULOCYTES # BLD: 0 10E3/UL
IMM GRANULOCYTES NFR BLD: 0 %
INR PPP: 1.11 (ref 0.85–1.15)
LYMPHOCYTES # BLD AUTO: 1.8 10E3/UL (ref 0.8–5.3)
LYMPHOCYTES NFR BLD AUTO: 22 %
MCH RBC QN AUTO: 28.5 PG (ref 26.5–33)
MCHC RBC AUTO-ENTMCNC: 32 G/DL (ref 31.5–36.5)
MCV RBC AUTO: 89 FL (ref 78–100)
MONOCYTES # BLD AUTO: 0.5 10E3/UL (ref 0–1.3)
MONOCYTES NFR BLD AUTO: 7 %
NEUTROPHILS # BLD AUTO: 5.6 10E3/UL (ref 1.6–8.3)
NEUTROPHILS NFR BLD AUTO: 67 %
NRBC # BLD AUTO: 0 10E3/UL
NRBC BLD AUTO-RTO: 0 /100
PLATELET # BLD AUTO: 290 10E3/UL (ref 150–450)
POTASSIUM BLD-SCNC: 3.9 MMOL/L (ref 3.5–5)
RBC # BLD AUTO: 4.42 10E6/UL (ref 3.8–5.2)
SODIUM SERPL-SCNC: 139 MMOL/L (ref 136–145)
TROPONIN I SERPL-MCNC: 0.01 NG/ML (ref 0–0.29)
WBC # BLD AUTO: 8.4 10E3/UL (ref 4–11)

## 2023-01-15 PROCEDURE — 96374 THER/PROPH/DIAG INJ IV PUSH: CPT | Mod: 59

## 2023-01-15 PROCEDURE — 250N000013 HC RX MED GY IP 250 OP 250 PS 637: Performed by: FAMILY MEDICINE

## 2023-01-15 PROCEDURE — 99285 EMERGENCY DEPT VISIT HI MDM: CPT | Mod: 25

## 2023-01-15 PROCEDURE — 74018 RADEX ABDOMEN 1 VIEW: CPT

## 2023-01-15 PROCEDURE — 250N000011 HC RX IP 250 OP 636: Performed by: FAMILY MEDICINE

## 2023-01-15 PROCEDURE — 84484 ASSAY OF TROPONIN QUANT: CPT | Performed by: FAMILY MEDICINE

## 2023-01-15 PROCEDURE — 80048 BASIC METABOLIC PNL TOTAL CA: CPT | Performed by: FAMILY MEDICINE

## 2023-01-15 PROCEDURE — 85730 THROMBOPLASTIN TIME PARTIAL: CPT | Performed by: FAMILY MEDICINE

## 2023-01-15 PROCEDURE — 93005 ELECTROCARDIOGRAM TRACING: CPT | Performed by: FAMILY MEDICINE

## 2023-01-15 PROCEDURE — 36415 COLL VENOUS BLD VENIPUNCTURE: CPT | Performed by: FAMILY MEDICINE

## 2023-01-15 PROCEDURE — 70498 CT ANGIOGRAPHY NECK: CPT

## 2023-01-15 PROCEDURE — 250N000011 HC RX IP 250 OP 636: Performed by: PHYSICIAN ASSISTANT

## 2023-01-15 PROCEDURE — 85610 PROTHROMBIN TIME: CPT | Performed by: FAMILY MEDICINE

## 2023-01-15 PROCEDURE — 82962 GLUCOSE BLOOD TEST: CPT

## 2023-01-15 PROCEDURE — 85025 COMPLETE CBC W/AUTO DIFF WBC: CPT | Performed by: FAMILY MEDICINE

## 2023-01-15 RX ORDER — MECLIZINE HYDROCHLORIDE 25 MG/1
25 TABLET ORAL ONCE
Status: COMPLETED | OUTPATIENT
Start: 2023-01-15 | End: 2023-01-15

## 2023-01-15 RX ORDER — IOPAMIDOL 755 MG/ML
100 INJECTION, SOLUTION INTRAVASCULAR ONCE
Status: COMPLETED | OUTPATIENT
Start: 2023-01-15 | End: 2023-01-15

## 2023-01-15 RX ORDER — CHOLECALCIFEROL (VITAMIN D3) 25 MCG
50 CAPSULE ORAL DAILY
COMMUNITY
Start: 2022-12-19

## 2023-01-15 RX ORDER — MULTIVIT-MIN/IRON/FOLIC ACID/K 18-600-40
CAPSULE ORAL
COMMUNITY
End: 2023-01-15

## 2023-01-15 RX ORDER — LORAZEPAM 2 MG/ML
0.5 INJECTION INTRAMUSCULAR ONCE
Status: COMPLETED | OUTPATIENT
Start: 2023-01-15 | End: 2023-01-15

## 2023-01-15 RX ORDER — DIPHENHYDRAMINE HYDROCHLORIDE, ZINC ACETATE 2; .1 G/100G; G/100G
1 CREAM TOPICAL 2 TIMES DAILY PRN
COMMUNITY
Start: 2022-12-16 | End: 2024-03-30

## 2023-01-15 RX ORDER — FLUOCINONIDE 0.5 MG/G
1 CREAM TOPICAL 2 TIMES DAILY PRN
COMMUNITY
Start: 2023-01-10 | End: 2024-03-30

## 2023-01-15 RX ORDER — MECLIZINE HYDROCHLORIDE 25 MG/1
25 TABLET ORAL EVERY 6 HOURS PRN
Qty: 30 TABLET | Refills: 0 | Status: SHIPPED | OUTPATIENT
Start: 2023-01-15 | End: 2023-07-07

## 2023-01-15 RX ADMIN — IOPAMIDOL 75 ML: 755 INJECTION, SOLUTION INTRAVENOUS at 15:59

## 2023-01-15 RX ADMIN — LORAZEPAM 0.5 MG: 2 INJECTION INTRAMUSCULAR; INTRAVENOUS at 17:15

## 2023-01-15 RX ADMIN — MECLIZINE HYDROCHLORIDE 25 MG: 25 TABLET ORAL at 16:36

## 2023-01-15 ASSESSMENT — ACTIVITIES OF DAILY LIVING (ADL)
ADLS_ACUITY_SCORE: 40
ADLS_ACUITY_SCORE: 40

## 2023-01-15 NOTE — ED PROVIDER NOTES
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 external cream     furosemide (LASIX) 20 MG tablet     hydroxychloroquine (PLAQUENIL) 200 MG tablet     ibuprofen (ADVIL/MOTRIN) 600 MG tablet     meclizine (ANTIVERT) 25 MG tablet     medroxyPROGESTERone (PROVERA) 10 MG tablet     metFORMIN (GLUCOPHAGE XR) 500 MG 24 hr  tablet     methocarbamol (ROBAXIN) 500 MG tablet     montelukast (SINGULAIR) 10 MG tablet     OLANZapine (ZYPREXA) 2.5 MG tablet     ondansetron (ZOFRAN-ODT) 4 MG ODT tab     pregabalin (LYRICA) 100 MG capsule     Respiratory Therapy Supplies (NEBULIZER) BRENDAN     SUMAtriptan (IMITREX) 25 MG tablet     valACYclovir (VALTREX) 1000 mg tablet       ALLERGIES:  Allergies   Allergen Reactions     Amoxicillin-Pot Clavulanate Other (See Comments), Swelling and Rash     PN: facial swelling, left side. Also had cortisone injection the same day as she started the Augmentin.  Noted in downtime recovery of chart.    PN: facial swelling, left side. Also had cortisone injection the same day as she started the Augmentin.; HUT Comment: PN: facial swelling, left side. Also had cortisone injection the same day as she started the Augmentin.Noted in downtime recovery of chart.; HUT Reaction: Rash; HUT Reaction: Unknown; HUT Reaction Type: Allergy; HUT Severity: Med; HUT Noted: [id number]     Hydrocodone-Acetaminophen Nausea and Vomiting and Rash     Update on 12/12  Pt says she can take tylenol just not the hydrocodone.   Other reaction(s): Rash       Latex Rash     HUT Reaction: Rash; HUT Reaction Type: Allergy; HUT Severity: Low; HUT Noted: [id number]  Other reaction(s): Rash       Oseltamivir Hives     med stopped, PN: med stopped  med stopped, PN: med stopped; HUT Comment: med stopped, PN: med stopped; HUT Reaction: Hives; HUT Reaction Type: Allergy; HUT Severity: Med; HUT Noted: [id number]     Penicillins Anaphylaxis     HUT Reaction: Anaphylaxis; HUT Reaction Type: Allergy; HUT Severity: High; HUT Noted: [id number]     Vancomycin Itching, Swelling and Rash     Other reaction(s): Redness  Flushed face, very itchy; HUT Comment: Flushed face, very itchy; HUT Reaction: Angioedema; HUT Reaction: Redness; HUT Severity: Med; HUT Noted: [id number]    facial     Hydrocodone Nausea and Vomiting and GI Disturbance     vomiting for days, PN: vomiting  for days; HUT Comment: vomiting for days; HUT Reaction: Gastrointestinal; HUT Reaction: Nausea And Vomiting; HUT Reaction Type: Intolerance; HUT Severity: Med; HUT Noted: [id number]  vomiting for days       Blood-Group Specific Substance Other (See Comments)     Patient has an anti-Cw and non-specific antibodies. Blood product orders may be delayed. Draw one red top and two purple top tubes for all type/screen/crossmatch orders.  Patient has anti-Cw, anti-K (Angella), Warm auto and nonspecific antibodies. Blood products may be delayed. Draw patient 24 hours prior to transfusion. Draw one red top and two purple top tubes for all type and screen orders.     Clavulanic Acid Angioedema     Fentanyl Itching     Naltrexone Other (See Comments)     Reaction(s): Vivid dreams.     Other Drug Allergy (See Comments)      See original file MRN [id number]. Files are marked for merge     Oxycodone Swelling     Adhesive Tape Rash     Silicone type     Band-Aid Anti-Itch      Other reaction(s): adhesive allergy     Cephalosporins Rash     Lamotrigine Rash     Possibly associated with Lamictal.   HUT Comment: Possibly associated with Lamictal. ; HUT Reaction: Rash; HUT Reaction Type: Allergy; HUT Severity: Low; HUT Noted: [id number]       FAMILY HISTORY:  Family History   Problem Relation Age of Onset     Diabetes Type 2  Maternal Grandmother      Diabetes Type 2  Paternal Grandmother      Breast Cancer Paternal Grandmother      Cerebrovascular Disease Father 53     Myocardial Infarction No family hx of      Coronary Artery Disease Early Onset No family hx of      Ovarian Cancer No family hx of      Colon Cancer No family hx of      Depression Mother      Anxiety Disorder Mother        SOCIAL HISTORY:   Social History     Socioeconomic History     Marital status: Single   Occupational History     Occupation: On disability   Tobacco Use     Smoking status: Never     Smokeless tobacco: Never   Substance and Sexual Activity     Alcohol use:  "No     Alcohol/week: 0.0 standard drinks     Drug use: No     Sexual activity: Not Currently     Partners: Male     Birth control/protection: I.U.D.     Comment: IUD - Mirena - placed July, 2015   Social History Narrative    Single.    Living in independent living portion of [employer] [facility].    On disability.    No regular exercise.        VITALS:  BP (!) 164/77   Pulse 96   Resp 17   Ht 1.575 m (5' 2\")   Wt 140.6 kg (310 lb)   LMP 12/01/2022   SpO2 97%   BMI 56.70 kg/m      PHYSICAL EXAM:  Physical Exam  Vitals and nursing note reviewed.   Constitutional:       Appearance: Normal appearance.   HENT:      Head: Normocephalic and atraumatic.      Right Ear: External ear normal.      Left Ear: External ear normal.      Nose: Nose normal.      Mouth/Throat:      Mouth: Mucous membranes are moist.   Eyes:      Extraocular Movements: Extraocular movements intact.      Conjunctiva/sclera: Conjunctivae normal.      Pupils: Pupils are equal, round, and reactive to light.   Cardiovascular:      Rate and Rhythm: Normal rate and regular rhythm.   Pulmonary:      Effort: Pulmonary effort is normal.      Breath sounds: Normal breath sounds. No wheezing or rales.   Abdominal:      General: Abdomen is flat. There is no distension.      Palpations: Abdomen is soft.      Tenderness: There is no abdominal tenderness. There is no guarding.   Musculoskeletal:         General: Normal range of motion.      Cervical back: Normal range of motion and neck supple.      Right lower leg: No edema.      Left lower leg: No edema.   Lymphadenopathy:      Cervical: No cervical adenopathy.   Skin:     General: Skin is warm and dry.   Neurological:      General: No focal deficit present.      Mental Status: She is alert and oriented to person, place, and time. Mental status is at baseline.      Comments: No gross focal neurologic deficits   Psychiatric:         Mood and Affect: Mood normal.         Behavior: Behavior normal.         " Thought Content: Thought content normal.          LAB:  All pertinent labs reviewed and interpreted.  Results for orders placed or performed during the hospital encounter of 01/15/23   CTA Head Neck with Contrast    Impression    IMPRESSION:   HEAD CT:  1.  No intracranial hemorrhage, mass lesions, hydrocephalus or CT evidence for an acute infarct.      Discussed the head CT findings with Dr. Flynn at 3:41 PM, 01/15/2023.    HEAD CTA:   1.  Normal CTA [place].    NECK CTA:  1.  Normal neck CTA.      Discussed the CT angiogram findings with Dr. Flynn at 4:16 PM, 01/15/2023. Please note the CTA images were made available for interpretation at 4:12 PM.   Abdomen XR 1 vw    Impression    IMPRESSION: Cholecystectomy clips. Appendectomy sutures. No evidence of ingested metallic foreign body. Excreted contrast from the recent CT is present within both collecting systems and the bladder. Nonobstructive bowel gas pattern. Scattered pelvic   phleboliths.   Basic metabolic panel   Result Value Ref Range    Sodium 139 136 - 145 mmol/L    Potassium 3.9 3.5 - 5.0 mmol/L    Chloride 103 98 - 107 mmol/L    Carbon Dioxide (CO2) 24 22 - 31 mmol/L    Anion Gap 12 5 - 18 mmol/L    Urea Nitrogen 9 8 - 22 mg/dL    Creatinine 0.65 0.60 - 1.10 mg/dL    Calcium 9.4 8.5 - 10.5 mg/dL    Glucose 158 (H) 70 - 125 mg/dL    GFR Estimate >90 >60 mL/min/1.73m2   Result Value Ref Range    INR 1.11 0.85 - 1.15   Partial thromboplastin time   Result Value Ref Range    aPTT 24 22 - 38 Seconds   Result Value Ref Range    Troponin I 0.01 0.00 - 0.29 ng/mL   Glucose by meter   Result Value Ref Range    GLUCOSE BY METER POCT 177 (H) 70 - 99 mg/dL   CBC with platelets and differential   Result Value Ref Range    WBC Count 8.4 4.0 - 11.0 10e3/uL    RBC Count 4.42 3.80 - 5.20 10e6/uL    Hemoglobin 12.6 11.7 - 15.7 g/dL    Hematocrit 39.4 35.0 - 47.0 %    MCV 89 78 - 100 fL    MCH 28.5 26.5 - 33.0 pg    MCHC 32.0 31.5 - 36.5 g/dL    RDW 13.3  10.0 - 15.0 %    Platelet Count 290 150 - 450 10e3/uL    % Neutrophils 67 %    % Lymphocytes 22 %    % Monocytes 7 %    % Eosinophils 4 %    % Basophils 0 %    % Immature Granulocytes 0 %    NRBCs per 100 WBC 0 <1 /100    Absolute Neutrophils 5.6 1.6 - 8.3 10e3/uL    Absolute Lymphocytes 1.8 0.8 - 5.3 10e3/uL    Absolute Monocytes 0.5 0.0 - 1.3 10e3/uL    Absolute Eosinophils 0.4 0.0 - 0.7 10e3/uL    Absolute Basophils 0.0 0.0 - 0.2 10e3/uL    Absolute Immature Granulocytes 0.0 <=0.4 10e3/uL    Absolute NRBCs 0.0 10e3/uL       RADIOLOGY:  Reviewed all pertinent imaging. Please see official radiology report.  Abdomen XR 1 vw   Final Result   IMPRESSION: Cholecystectomy clips. Appendectomy sutures. No evidence of ingested metallic foreign body. Excreted contrast from the recent CT is present within both collecting systems and the bladder. Nonobstructive bowel gas pattern. Scattered pelvic    phleboliths.      CTA Head Neck with Contrast   Final Result   IMPRESSION:    HEAD CT:   1.  No intracranial hemorrhage, mass lesions, hydrocephalus or CT evidence for an acute infarct.         Discussed the head CT findings with Dr. Flynn at 3:41 PM, 01/15/2023.      HEAD CTA:    1.  Normal CTA [place].      NECK CTA:   1.  Normal neck CTA.         Discussed the CT angiogram findings with Dr. Flynn at 4:16 PM, 01/15/2023. Please note the CTA images were made available for interpretation at 4:12 PM.      MR Brain w/o Contrast    (Results Pending)     EKG:    Performed at: 4:33 PM  Impression: Normal EKG  Rate: 95  Rhythm: Sinus  Axis: Normal  AZ Interval: 156  QRS Interval: 74  QTc Interval: 437  ST Changes: No acute ischemic changes  Comparison: November 2022, no changes    I have independently reviewed and interpreted the EKG(s) documented above.    I, Quinten Lundberg, am serving as a scribe to document services personally performed by Dr. Flynn based on my observation and the provider's statements to me. I,  Reece Flynn MD attest that Quinten Tamika is acting in a scribe capacity, has observed my performance of the services and has documented them in accordance with my direction.    Reece Flynn M.D.  Emergency Medicine  [facility] EMERGENCY ROOM  [address] [address]  [phone]  Dept: [phone]       Reece Flynn MD  01/15/23 3604

## 2023-01-15 NOTE — ED NOTES
Bed: [place] [address]  Expected date:   Expected time:   Means of arrival: Ambulance  Comments:  33 yo dizzy x 2 hours

## 2023-01-15 NOTE — PROGRESS NOTES
Pharmacy Note - Admission Medication History    Pertinent Provider Information: med rec in progress, have not spoken with patient.      ______________________________________________________________________    Prior To Admission (PTA) med list completed and updated in EMR.       PTA Med List   Medication Sig Note Last Dose    albuterol (PROAIR HFA/PROVENTIL HFA/VENTOLIN HFA) 108 (90 Base) MCG/ACT inhaler Inhale 2 puffs into the lungs every 6 hours as needed for shortness of breath / dyspnea or wheezing  Unknown    albuterol (PROVENTIL) (2.5 MG/3ML) 0.083% neb solution Take 2.5 mg by nebulization every 6 hours as needed for shortness of breath / dyspnea or wheezing  Unknown    alum & mag hydroxide-simethicone (MAALOX MAX) 400-400-40 MG/5ML SUSP suspension Take 15 mLs by mouth every 6 hours as needed for heartburn or other (sore throat)  Unknown    BANOPHEN 2-0.1 % external cream Apply 1 applicator topically 2 times daily as needed for itching  Unknown    brexpiprazole (REXULTI) 2 MG tablet Take 2 mg by mouth daily      busPIRone (BUSPAR) 10 MG tablet Take 2 tablets (20 mg) by mouth 3 times daily      cetirizine (ZYRTEC) 10 MG tablet Take 1 tablet (10 mg) by mouth daily      Cholecalciferol (D3 HIGH POTENCY) 25 MCG (1000 UT) CAPS Take 50 mcg by mouth daily      desvenlafaxine (PRISTIQ) 100 MG 24 hr tablet Take 1 tablet (100 mg) by mouth daily      ferrous sulfate (FEROSUL) 325 (65 Fe) MG tablet Take 1 tablet (325 mg) by mouth daily (with breakfast)      fluocinonide (LIDEX) 0.05 % external cream Apply 1 Application topically See Admin Instructions Apply thinly to knee 2 times daily, Monday through Fridays, off on weekends as needed. Avoid face and skin folds.  Unknown    furosemide (LASIX) 20 MG tablet Take 20 mg by mouth daily      hydroxychloroquine (PLAQUENIL) 200 MG tablet Take 1 tablet (200 mg) by mouth 2 times daily      ibuprofen (ADVIL/MOTRIN) 600 MG tablet Take 1 tablet (600 mg) by mouth every 8 hours as  needed for moderate pain  Unknown    medroxyPROGESTERone (PROVERA) 10 MG tablet Take 1 tablet (10 mg) by mouth daily      metFORMIN (GLUCOPHAGE XR) 500 MG 24 hr tablet Take 1,000 mg by mouth daily (with dinner) Take at 5 pm.      methocarbamol (ROBAXIN) 500 MG tablet Take 1 tablet (500 mg) by mouth 4 times daily as needed 1/15/2023: New 1/5/23, filled? Unknown    montelukast (SINGULAIR) 10 MG tablet Take 10 mg by mouth every evening      OLANZapine (ZYPREXA) 2.5 MG tablet Take 2.5 mg by mouth daily as needed (anxiety)  Unknown    ondansetron (ZOFRAN-ODT) 4 MG ODT tab Take 1 tablet (4 mg) by mouth every 8 hours as needed for nausea  Unknown    pregabalin (LYRICA) 100 MG capsule Take 1 capsule (100 mg) by mouth 3 times daily      Respiratory Therapy Supplies (NEBULIZER) BRENDAN Nebulizer device.  Albuterol nebulization every 2 hours as needed for shortness of breath or wheezing.  Unknown    SUMAtriptan (IMITREX) 25 MG tablet Take 25 mg by mouth at onset of headache for migraine May repeat in 2 hours.  Unknown    valACYclovir (VALTREX) 1000 mg tablet Take 2 tablets by mouth two times daily for one day. Use as needed at onset of cold sore.   Unknown       [facility]  Method of interview communication: N/A    Summary of Changes to PTA Med List  New: fluocinonide 0.05% cream, diphenhydramine zinc 2% anti-itch cream  Discontinued: acetaminophen #10 tabs (Aug)  Changed: vitamin D 1000 unit strength, taking 2 = 2000 units; brexpiprazole 2 mg strength taking 2 mg dose, not 0.5 mg strength taking 2 mg dose    Patient was asked about OTC/herbal products specifically.  PTA med list reflects this.    In the past week, patient estimated taking medication this percent of the time:  Unable to assess.    Allergies were NOT reviewed, assessed, and updated with the patient.      The information provided in this note is only as accurate as the sources available at the time of the update(s).    Thank  you for the opportunity to participate in the care of this patient.    Elke Freed, [facility]  1/15/2023 3:49 PM

## 2023-01-15 NOTE — ED TRIAGE NOTES
Pt arrives via EMS for dizziness for the past 2 hours.  Pt reports headache started en route to the hospital.  Pt reports a fall from the shower several days ago.      Of note pt has a known history of ingesting foreign objects and just had surgery on 12/27. Pt has had multiple ER visits in the last few months.  Pt is a 1:1 due to history of foreign body ingestion for self harm intent.    Stroke code called by MD.

## 2023-01-15 NOTE — CONSULTS
"    [facility]    Stroke Telephone Note    I was called by  on 01/15/23 regarding patient Nevin Alvarado. The patient is a 32 year old female who presents to hospital with 2 hours of dizziness which is worse at rest and with motion.     Stroke Code Data (for stroke code without tele)  Stroke code activated 01/15/23   1538   Stroke provider first response  01/15/23   1540    01/15/23   1540   Last known normal 01/15/23   0130        Time of discovery   (or onset of symptoms) 01/15/23   1330   Head CT read by Stroke Neuro Dr/Provider 01/15/23   1540   Was stroke code de-escalated? Yes 01/15/23 1624          Imaging Findings   Ct head neg for acute abnormalities  CTA neg for LVO , stenosis or other vascular abnormalities      Intravenous Thrombolysis  Not given due to:   - stroke mimic: Vertigo    Endovascular Treatment  Not initiated due to absence of proximal vessel occlusion    Impression  Dizziness    Recommendations   Mri brain with and without contrast  Please reach out to stroke team once MRI is completed    My recommendations are based on the information provided over the phone by Nevin Alvarado's in-person providers. They are not intended to replace the clinical judgment of her in-person providers. I was not requested to personally see or examine the patient at this time.    Chase English MD  Vascular Neurology    To page me or covering stroke neurology team member, click here: AMCOM  Choose \"On Call\" tab at top, then select \"NEUROLOGY/ALL SITES\" from middle drop-down box, press Enter, then look for \"stroke\" or \"telestroke\" for your site.           "

## 2023-01-16 LAB
ATRIAL RATE - MUSE: 91 BPM
DIASTOLIC BLOOD PRESSURE - MUSE: 77 MMHG
INTERPRETATION ECG - MUSE: NORMAL
P AXIS - MUSE: 43 DEGREES
PR INTERVAL - MUSE: 156 MS
QRS DURATION - MUSE: 82 MS
QT - MUSE: 348 MS
QTC - MUSE: 428 MS
R AXIS - MUSE: 73 DEGREES
SYSTOLIC BLOOD PRESSURE - MUSE: 164 MMHG
T AXIS - MUSE: 15 DEGREES
VENTRICULAR RATE- MUSE: 91 BPM

## 2023-01-19 ENCOUNTER — APPOINTMENT (OUTPATIENT)
Dept: GENERAL RADIOLOGY | Facility: CLINIC | Age: 33
End: 2023-01-19
Attending: EMERGENCY MEDICINE
Payer: COMMERCIAL

## 2023-01-19 ENCOUNTER — HOSPITAL ENCOUNTER (EMERGENCY)
Facility: CLINIC | Age: 33
Discharge: HOME | End: 2023-01-20
Attending: EMERGENCY MEDICINE | Admitting: EMERGENCY MEDICINE
Payer: COMMERCIAL

## 2023-01-19 DIAGNOSIS — T18.9XXA SWALLOWED FOREIGN BODY, INITIAL ENCOUNTER: ICD-10-CM

## 2023-01-19 DIAGNOSIS — R45.851 SUICIDAL IDEATION: ICD-10-CM

## 2023-01-19 LAB
AMPHETAMINES UR QL SCN: NORMAL
BARBITURATES UR QL SCN: NORMAL
BENZODIAZ UR QL SCN: NORMAL
BZE UR QL SCN: NORMAL
CANNABINOIDS UR QL SCN: NORMAL
HCG UR QL: NEGATIVE
OPIATES UR QL SCN: NORMAL
PCP QUAL URINE (ROCHE): NORMAL
SARS-COV-2 RNA RESP QL NAA+PROBE: NEGATIVE

## 2023-01-19 PROCEDURE — 80307 DRUG TEST PRSMV CHEM ANLYZR: CPT | Performed by: EMERGENCY MEDICINE

## 2023-01-19 PROCEDURE — 99285 EMERGENCY DEPT VISIT HI MDM: CPT | Mod: 25

## 2023-01-19 PROCEDURE — 43235 EGD DIAGNOSTIC BRUSH WASH: CPT

## 2023-01-19 PROCEDURE — 74018 RADEX ABDOMEN 1 VIEW: CPT

## 2023-01-19 PROCEDURE — U0005 INFEC AGEN DETEC AMPLI PROBE: HCPCS | Performed by: EMERGENCY MEDICINE

## 2023-01-19 PROCEDURE — C9803 HOPD COVID-19 SPEC COLLECT: HCPCS

## 2023-01-19 PROCEDURE — 81025 URINE PREGNANCY TEST: CPT | Performed by: EMERGENCY MEDICINE

## 2023-01-19 RX ORDER — ALBUTEROL SULFATE 90 UG/1
2 AEROSOL, METERED RESPIRATORY (INHALATION) EVERY 6 HOURS PRN
Status: DISCONTINUED | OUTPATIENT
Start: 2023-01-19 | End: 2023-01-20 | Stop reason: HOSPADM

## 2023-01-19 RX ORDER — CETIRIZINE HYDROCHLORIDE 10 MG/1
10 TABLET ORAL EVERY EVENING
Status: DISCONTINUED | OUTPATIENT
Start: 2023-01-19 | End: 2023-01-20 | Stop reason: HOSPADM

## 2023-01-19 RX ORDER — FUROSEMIDE 20 MG
20 TABLET ORAL DAILY
Status: DISCONTINUED | OUTPATIENT
Start: 2023-01-20 | End: 2023-01-20 | Stop reason: HOSPADM

## 2023-01-19 RX ORDER — DESVENLAFAXINE 50 MG/1
100 TABLET, FILM COATED, EXTENDED RELEASE ORAL DAILY
Status: DISCONTINUED | OUTPATIENT
Start: 2023-01-20 | End: 2023-01-20 | Stop reason: HOSPADM

## 2023-01-19 RX ORDER — METFORMIN HCL 500 MG
1000 TABLET, EXTENDED RELEASE 24 HR ORAL
Status: DISCONTINUED | OUTPATIENT
Start: 2023-01-20 | End: 2023-01-20 | Stop reason: HOSPADM

## 2023-01-19 RX ORDER — MONTELUKAST SODIUM 10 MG/1
10 TABLET ORAL EVERY EVENING
Status: DISCONTINUED | OUTPATIENT
Start: 2023-01-19 | End: 2023-01-20 | Stop reason: HOSPADM

## 2023-01-19 RX ORDER — MEDROXYPROGESTERONE ACETATE 10 MG
10 TABLET ORAL DAILY
Status: DISCONTINUED | OUTPATIENT
Start: 2023-01-20 | End: 2023-01-20 | Stop reason: HOSPADM

## 2023-01-19 RX ORDER — BUSPIRONE HYDROCHLORIDE 10 MG/1
20 TABLET ORAL 3 TIMES DAILY
Status: DISCONTINUED | OUTPATIENT
Start: 2023-01-19 | End: 2023-01-20 | Stop reason: HOSPADM

## 2023-01-19 RX ORDER — ALBUTEROL SULFATE 0.83 MG/ML
2.5 SOLUTION RESPIRATORY (INHALATION) EVERY 6 HOURS PRN
Status: DISCONTINUED | OUTPATIENT
Start: 2023-01-19 | End: 2023-01-20 | Stop reason: HOSPADM

## 2023-01-19 ASSESSMENT — ENCOUNTER SYMPTOMS: ABDOMINAL PAIN: 1

## 2023-01-19 ASSESSMENT — ACTIVITIES OF DAILY LIVING (ADL): ADLS_ACUITY_SCORE: 38

## 2023-01-20 ENCOUNTER — APPOINTMENT (OUTPATIENT)
Dept: GENERAL RADIOLOGY | Facility: CLINIC | Age: 33
End: 2023-01-20
Attending: EMERGENCY MEDICINE
Payer: COMMERCIAL

## 2023-01-20 ENCOUNTER — ANESTHESIA (OUTPATIENT)
Dept: GASTROENTEROLOGY | Facility: CLINIC | Age: 33
End: 2023-01-20
Payer: COMMERCIAL

## 2023-01-20 ENCOUNTER — ANESTHESIA EVENT (OUTPATIENT)
Dept: GASTROENTEROLOGY | Facility: CLINIC | Age: 33
End: 2023-01-20
Payer: COMMERCIAL

## 2023-01-20 VITALS
OXYGEN SATURATION: 94 % | HEART RATE: 101 BPM | DIASTOLIC BLOOD PRESSURE: 78 MMHG | TEMPERATURE: 98.9 F | RESPIRATION RATE: 17 BRPM | HEIGHT: 62 IN | SYSTOLIC BLOOD PRESSURE: 130 MMHG | WEIGHT: 293 LBS | BODY MASS INDEX: 53.92 KG/M2

## 2023-01-20 LAB — UPPER GI ENDOSCOPY: NORMAL

## 2023-01-20 PROCEDURE — 250N000011 HC RX IP 250 OP 636: Performed by: NURSE ANESTHETIST, CERTIFIED REGISTERED

## 2023-01-20 PROCEDURE — 258N000003 HC RX IP 258 OP 636: Performed by: NURSE ANESTHETIST, CERTIFIED REGISTERED

## 2023-01-20 PROCEDURE — 999N000010 HC STATISTIC ANES STAT CODE-CRNA PER MINUTE: Performed by: INTERNAL MEDICINE

## 2023-01-20 PROCEDURE — 90791 PSYCH DIAGNOSTIC EVALUATION: CPT

## 2023-01-20 PROCEDURE — 370N000017 HC ANESTHESIA TECHNICAL FEE, PER MIN: Performed by: INTERNAL MEDICINE

## 2023-01-20 PROCEDURE — 250N000009 HC RX 250: Performed by: NURSE ANESTHETIST, CERTIFIED REGISTERED

## 2023-01-20 PROCEDURE — 43235 EGD DIAGNOSTIC BRUSH WASH: CPT | Performed by: INTERNAL MEDICINE

## 2023-01-20 PROCEDURE — 74019 RADEX ABDOMEN 2 VIEWS: CPT

## 2023-01-20 PROCEDURE — 250N000013 HC RX MED GY IP 250 OP 250 PS 637: Performed by: EMERGENCY MEDICINE

## 2023-01-20 RX ORDER — SODIUM CHLORIDE 9 MG/ML
INJECTION, SOLUTION INTRAVENOUS CONTINUOUS PRN
Status: DISCONTINUED | OUTPATIENT
Start: 2023-01-20 | End: 2023-01-20

## 2023-01-20 RX ORDER — PROPOFOL 10 MG/ML
INJECTION, EMULSION INTRAVENOUS PRN
Status: DISCONTINUED | OUTPATIENT
Start: 2023-01-20 | End: 2023-01-20

## 2023-01-20 RX ORDER — DEXMEDETOMIDINE HYDROCHLORIDE 4 UG/ML
INJECTION, SOLUTION INTRAVENOUS PRN
Status: DISCONTINUED | OUTPATIENT
Start: 2023-01-20 | End: 2023-01-20

## 2023-01-20 RX ORDER — ONDANSETRON 2 MG/ML
INJECTION INTRAMUSCULAR; INTRAVENOUS PRN
Status: DISCONTINUED | OUTPATIENT
Start: 2023-01-20 | End: 2023-01-20

## 2023-01-20 RX ADMIN — ONDANSETRON 4 MG: 2 INJECTION INTRAMUSCULAR; INTRAVENOUS at 12:09

## 2023-01-20 RX ADMIN — PROPOFOL 50 MG: 10 INJECTION, EMULSION INTRAVENOUS at 12:01

## 2023-01-20 RX ADMIN — MEDROXYPROGESTERONE ACETATE 10 MG: 10 TABLET ORAL at 13:38

## 2023-01-20 RX ADMIN — CETIRIZINE HYDROCHLORIDE 10 MG: 10 TABLET, FILM COATED ORAL at 00:51

## 2023-01-20 RX ADMIN — BUSPIRONE HYDROCHLORIDE 20 MG: 10 TABLET ORAL at 00:51

## 2023-01-20 RX ADMIN — PROPOFOL 50 MG: 10 INJECTION, EMULSION INTRAVENOUS at 12:03

## 2023-01-20 RX ADMIN — MONTELUKAST 10 MG: 10 TABLET, FILM COATED ORAL at 00:52

## 2023-01-20 RX ADMIN — DESVENLAFAXINE SUCCINATE 100 MG: 50 TABLET, EXTENDED RELEASE ORAL at 13:38

## 2023-01-20 RX ADMIN — BUSPIRONE HYDROCHLORIDE 20 MG: 10 TABLET ORAL at 13:37

## 2023-01-20 RX ADMIN — BREXPIPRAZOLE 2 MG: 2 TABLET ORAL at 00:51

## 2023-01-20 RX ADMIN — PROPOFOL 50 MG: 10 INJECTION, EMULSION INTRAVENOUS at 12:05

## 2023-01-20 RX ADMIN — SODIUM CHLORIDE: 9 INJECTION, SOLUTION INTRAVENOUS at 11:58

## 2023-01-20 RX ADMIN — DEXMEDETOMIDINE HYDROCHLORIDE 12 MCG: 200 INJECTION INTRAVENOUS at 11:59

## 2023-01-20 ASSESSMENT — COLUMBIA-SUICIDE SEVERITY RATING SCALE - C-SSRS
2. HAVE YOU ACTUALLY HAD ANY THOUGHTS OF KILLING YOURSELF?: YES
LETHALITY/MEDICAL DAMAGE CODE FIRST POTENTIAL ATTEMPT: BEHAVIOR LIKELY TO RESULT IN INJURY BUT NOT LIKELY TO CAUSE DEATH
REASONS FOR IDEATION PAST MONTH: DOES NOT APPLY
5. HAVE YOU STARTED TO WORK OUT OR WORKED OUT THE DETAILS OF HOW TO KILL YOURSELF? DO YOU INTEND TO CARRY OUT THIS PLAN?: NO
TOTAL  NUMBER OF ACTUAL ATTEMPTS LIFETIME: 1
LETHALITY/MEDICAL DAMAGE CODE FIRST ACTUAL ATTEMPT: SEVERE PHYSICAL DAMAGE, MEDICAL HOSPITALIZATION WITH INTENSIVE CARE REQUIRED
6. HAVE YOU EVER DONE ANYTHING, STARTED TO DO ANYTHING, OR PREPARED TO DO ANYTHING TO END YOUR LIFE?: NO
4. HAVE YOU HAD THESE THOUGHTS AND HAD SOME INTENTION OF ACTING ON THEM?: YES
2. HAVE YOU ACTUALLY HAD ANY THOUGHTS OF KILLING YOURSELF?: NO
LETHALITY/MEDICAL DAMAGE CODE MOST LETHAL ACTUAL ATTEMPT: SEVERE PHYSICAL DAMAGE, MEDICAL HOSPITALIZATION WITH INTENSIVE CARE REQUIRED
LETHALITY/MEDICAL DAMAGE CODE MOST RECENT POTENTIAL ATTEMPT: BEHAVIOR LIKELY TO RESULT IN INJURY BUT NOT LIKELY TO CAUSE DEATH
LETHALITY/MEDICAL DAMAGE CODE MOST LETHAL POTENTIAL ATTEMPT: BEHAVIOR LIKELY TO RESULT IN INJURY BUT NOT LIKELY TO CAUSE DEATH
4. HAVE YOU HAD THESE THOUGHTS AND HAD SOME INTENTION OF ACTING ON THEM?: NO
LETHALITY/MEDICAL DAMAGE CODE MOST RECENT ACTUAL ATTEMPT: SEVERE PHYSICAL DAMAGE, MEDICAL HOSPITALIZATION WITH INTENSIVE CARE REQUIRED
ATTEMPT LIFETIME: YES
ATTEMPT PAST THREE MONTHS: NO
1. IN THE PAST MONTH, HAVE YOU WISHED YOU WERE DEAD OR WISHED YOU COULD GO TO SLEEP AND NOT WAKE UP?: NO
REASONS FOR IDEATION LIFETIME: COMPLETELY TO GET ATTENTION, REVENGE, OR A REACTION FROM OTHERS
5. HAVE YOU STARTED TO WORK OUT OR WORKED OUT THE DETAILS OF HOW TO KILL YOURSELF? DO YOU INTEND TO CARRY OUT THIS PLAN?: YES
1. HAVE YOU WISHED YOU WERE DEAD OR WISHED YOU COULD GO TO SLEEP AND NOT WAKE UP?: YES
TOTAL  NUMBER OF ABORTED OR SELF INTERRUPTED ATTEMPTS LIFETIME: NO
TOTAL  NUMBER OF INTERRUPTED ATTEMPTS LIFETIME: NO
3. HAVE YOU BEEN THINKING ABOUT HOW YOU MIGHT KILL YOURSELF?: YES

## 2023-01-20 ASSESSMENT — ACTIVITIES OF DAILY LIVING (ADL)
ADLS_ACUITY_SCORE: 40

## 2023-01-20 NOTE — ED NOTES
Pt given snack tray post Endo. Md confirmed discharge back to Group home. [facility] contacted, and agreeable to return. [facility] will be contacted for ride. Voice mail left for guardian. MD spoke with Guardian last night.

## 2023-01-20 NOTE — CONSULTS
[facility]  Gastroenterology Consultation         Nevin Alvarado  [address] [facility] [address]  32 year old female    Admission Date/Time: 1/19/2023  Primary Care Provider: Latonya Knight  Referring / Attending Physician:  Dr. Brent Farah    We were asked to see the patient in consultation by Dr. Brent Farah for evaluation of foreign body ingestion.      CC: foreign body ingestion and suicidal ideation    HPI:  Nevin Alvarado is a 32 year old female who has a past medical history of anxiety, borderline personality disorder, suicidal ideation, anorexia, depression, self harm whom presented to ED on 1/19/23 for suicidal ideation and ingestion of twist tie from bread bag on 1/19/23. Has had numerous prior similar episodes of foreign body ingestion. Most recently on 12/27/22 with 18 EGD in last years for same complaint.    Xray of  Abdomen revealed stable linear metallic density 8 cm in stomach. Drug screen negative.     ROS: A comprehensive ten point review of systems was negative aside from those in mentioned in the HPI.      PAST MED HX:  I have reviewed this patient's medical history and updated it with pertinent information if needed.   Past Medical History:   Diagnosis Date     ADD (attention deficit disorder)      Anorexia nervosa with bulimia     history of; on Topamax     Anxiety      Asthma      Borderline personality disorder (H)      Depression      Eating disorder      H/O self-harm      h/o Suicide attempt 02/21/2018     History of pulmonary embolism 12/2019    Provoked. Completed 3 month course of Apixaban     Morbid obesity      Neuropathy      Obesity      PTSD (post-traumatic stress disorder)      Pulmonary emboli (H)      Rectal foreign body - Recurrent issue, self placed      Self-injurious behavior     hx swallowing nonfood items such as mickie pins     Sleep apnea     uses cpap     Suicidal overdose (H)      Swallowed foreign  body - Recurrent issue, self placed      Syncope        MEDICATIONS:   Prior to Admission Medications   Prescriptions Last Dose Informant Patient Reported? Taking?   BANOPHEN 2-0.1 % external cream prn  Yes Yes   Sig: Apply 1 applicator topically 2 times daily as needed for itching   Cholecalciferol (D3 HIGH POTENCY) 25 MCG (1000 UT) CAPS 1/19/2023 at am  Yes Yes   Sig: Take 50 mcg by mouth daily   OLANZapine (ZYPREXA) 2.5 MG tablet 1/19/2023 at 5pm  Yes Yes   Sig: Take 1.25 mg by mouth daily (with dinner) At 5pm   Respiratory Therapy Supplies (NEBULIZER) BRENDAN  Self No No   Sig: Nebulizer device.  Albuterol nebulization every 2 hours as needed for shortness of breath or wheezing.   SUMAtriptan (IMITREX) 25 MG tablet prn Self Yes Yes   Sig: Take 25 mg by mouth at onset of headache for migraine May repeat in 2 hours.   albuterol (PROAIR HFA/PROVENTIL HFA/VENTOLIN HFA) 108 (90 Base) MCG/ACT inhaler prn Self No Yes   Sig: Inhale 2 puffs into the lungs every 6 hours as needed for shortness of breath / dyspnea or wheezing   albuterol (PROVENTIL) (2.5 MG/3ML) 0.083% neb solution prn  Yes Yes   Sig: Take 2.5 mg by nebulization every 6 hours as needed for shortness of breath / dyspnea or wheezing   alum & mag hydroxide-simethicone (MAALOX MAX) 400-400-40 MG/5ML SUSP suspension prn  No Yes   Sig: Take 15 mLs by mouth every 6 hours as needed for heartburn or other (sore throat)   brexpiprazole (REXULTI) 2 MG tablet 1/18/2023 at 8pm  Yes Yes   Sig: Take 2 mg by mouth every evening   busPIRone (BUSPAR) 10 MG tablet 1/19/2023 at x2 Self No Yes   Sig: Take 2 tablets (20 mg) by mouth 3 times daily   cetirizine (ZYRTEC) 10 MG tablet 1/18/2023 at 8pm Self No Yes   Sig: Take 1 tablet (10 mg) by mouth daily   Patient taking differently: Take 10 mg by mouth every evening   desvenlafaxine (PRISTIQ) 100 MG 24 hr tablet 1/19/2023 at am Self No Yes   Sig: Take 1 tablet (100 mg) by mouth daily   ferrous sulfate (FEROSUL) 325 (65 Fe) MG  tablet 1/19/2023 at am Self No Yes   Sig: Take 1 tablet (325 mg) by mouth daily (with breakfast)   fluocinonide (LIDEX) 0.05 % external cream   Yes Yes   Sig: Apply 1 Application topically See Admin Instructions Apply thinly to knee 2 times daily, Monday through Fridays, off on weekends as needed. Avoid face and skin folds.   furosemide (LASIX) 20 MG tablet 1/19/2023 at am  Yes Yes   Sig: Take 20 mg by mouth daily   hydroxychloroquine (PLAQUENIL) 200 MG tablet 1/19/2023 at am Self No Yes   Sig: Take 1 tablet (200 mg) by mouth 2 times daily   Patient taking differently: Take 400 mg by mouth daily   ibuprofen (ADVIL/MOTRIN) 600 MG tablet prn  No Yes   Sig: Take 1 tablet (600 mg) by mouth every 8 hours as needed for moderate pain   meclizine (ANTIVERT) 25 MG tablet Not Started at New 1/15/23  No No   Sig: Take 1 tablet (25 mg) by mouth every 6 hours as needed for dizziness   medroxyPROGESTERone (PROVERA) 10 MG tablet 1/19/2023 at am  No Yes   Sig: Take 1 tablet (10 mg) by mouth daily   metFORMIN (GLUCOPHAGE XR) 500 MG 24 hr tablet 1/19/2023 at 5pm  Yes Yes   Sig: Take 1,000 mg by mouth daily (with dinner) Take at 5 pm.   methocarbamol (ROBAXIN) 500 MG tablet Past Month at couple weeks ago  No Yes   Sig: Take 1 tablet (500 mg) by mouth 4 times daily as needed   montelukast (SINGULAIR) 10 MG tablet 1/18/2023 at 8pm Self Yes Yes   Sig: Take 10 mg by mouth every evening   ondansetron (ZOFRAN-ODT) 4 MG ODT tab prn Self No Yes   Sig: Take 1 tablet (4 mg) by mouth every 8 hours as needed for nausea   pregabalin (LYRICA) 100 MG capsule 1/19/2023 at x2 Self No Yes   Sig: Take 1 capsule (100 mg) by mouth 3 times daily   valACYclovir (VALTREX) 1000 mg tablet prn Self Yes Yes   Sig: Take 2 tablets by mouth two times daily for one day. Use as needed at onset of cold sore.       Facility-Administered Medications: None       ALLERGIES:   Allergies   Allergen Reactions     Amoxicillin-Pot Clavulanate Other (See Comments), Swelling and  Rash     PN: facial swelling, left side. Also had cortisone injection the same day as she started the Augmentin.  Noted in downtime recovery of chart.    PN: facial swelling, left side. Also had cortisone injection the same day as she started the Augmentin.; HUT Comment: PN: facial swelling, left side. Also had cortisone injection the same day as she started the Augmentin.Noted in downtime recovery of chart.; HUT Reaction: Rash; HUT Reaction: Unknown; HUT Reaction Type: Allergy; HUT Severity: Med; HUT Noted: [id number]     Hydrocodone-Acetaminophen Nausea and Vomiting and Rash     Update on 12/12  Pt says she can take tylenol just not the hydrocodone.   Other reaction(s): Rash       Latex Rash     HUT Reaction: Rash; HUT Reaction Type: Allergy; HUT Severity: Low; HUT Noted: [id number]  Other reaction(s): Rash       Oseltamivir Hives     med stopped, PN: med stopped  med stopped, PN: med stopped; HUT Comment: med stopped, PN: med stopped; HUT Reaction: Hives; HUT Reaction Type: Allergy; HUT Severity: Med; HUT Noted: [id number]     Penicillins Anaphylaxis     HUT Reaction: Anaphylaxis; HUT Reaction Type: Allergy; HUT Severity: High; HUT Noted: [id number]     Vancomycin Itching, Swelling and Rash     Other reaction(s): Redness  Flushed face, very itchy; HUT Comment: Flushed face, very itchy; HUT Reaction: Angioedema; HUT Reaction: Redness; HUT Severity: Med; HUT Noted: [id number]    facial     Hydrocodone Nausea and Vomiting and GI Disturbance     vomiting for days, PN: vomiting for days; HUT Comment: vomiting for days; HUT Reaction: Gastrointestinal; HUT Reaction: Nausea And Vomiting; HUT Reaction Type: Intolerance; HUT Severity: Med; HUT Noted: [id number]  vomiting for days       Blood-Group Specific Substance Other (See Comments)     Patient has an anti-Cw and non-specific antibodies. Blood product orders may be delayed. Draw one red top and two purple top tubes for all type/screen/crossmatch orders.  Patient has anti-Cw,  anti-K (Angella), Warm auto and nonspecific antibodies. Blood products may be delayed. Draw patient 24 hours prior to transfusion. Draw one red top and two purple top tubes for all type and screen orders.     Clavulanic Acid Angioedema     Fentanyl Itching     Naltrexone Other (See Comments)     Reaction(s): Vivid dreams.     Other Drug Allergy (See Comments)      See original file MRN [id number]. Files are marked for merge     Oxycodone Swelling     Adhesive Tape Rash     Silicone type     Band-Aid Anti-Itch      Other reaction(s): adhesive allergy     Cephalosporins Rash     Lamotrigine Rash     Possibly associated with Lamictal.   HUT Comment: Possibly associated with Lamictal. ; HUT Reaction: Rash; HUT Reaction Type: Allergy; HUT Severity: Low; HUT Noted: [id number]       SOCIAL HISTORY:  Social History     Tobacco Use     Smoking status: Never     Smokeless tobacco: Never   Substance Use Topics     Alcohol use: No     Alcohol/week: 0.0 standard drinks     Drug use: No       FAMILY HISTORY:  Family History   Problem Relation Age of Onset     Diabetes Type 2  Maternal Grandmother      Diabetes Type 2  Paternal Grandmother      Breast Cancer Paternal Grandmother      Cerebrovascular Disease Father 53     Myocardial Infarction No family hx of      Coronary Artery Disease Early Onset No family hx of      Ovarian Cancer No family hx of      Colon Cancer No family hx of      Depression Mother      Anxiety Disorder Mother        PHYSICAL EXAM:   General  Alert, oriented and mildly uncomfortable  Vital Signs with Ranges  Temp: 98.9  F (37.2  C) Temp src: Temporal BP: (!) 135/91 Pulse: 105   Resp: 18 SpO2: 96 % O2 Device: None (Room air)    No intake/output data recorded.    Constitutional: alert, mild distress and cooperative   Cardiovascular: negative, PMI normal. No lifts, heaves, or thrills. RRR. No murmurs, clicks gallops or rub  Respiratory: negative, Percussion normal. Good diaphragmatic excursion. Lungs  clear  Abdomen: Abdomen soft, LUQ tender. BS normal. No masses, organomegaly          ADDITIONAL COMMENTS:   I reviewed the patient's new clinical lab test results.   Recent Labs   Lab Test 01/15/23  1625 01/15/23  1544 01/05/23  1905 12/28/22  0742 10/23/22  1800 10/15/22  2319   WBC  --  8.4 8.0 5.5   < > 6.7   HGB  --  12.6 12.3 12.1   < > 11.7   MCV  --  89 87 89   < > 88   PLT  --  290 273 249   < > 283   INR 1.11  --   --  1.06  --  1.03    < > = values in this interval not displayed.     Recent Labs   Lab Test 01/15/23  1544 01/05/23 1905 12/28/22  0742   POTASSIUM 3.9 3.5 4.2   CHLORIDE 103 107 103   CO2 24 28 27   BUN 9 10 10.0   ANIONGAP 12 8 10     Recent Labs   Lab Test 01/05/23 2020 01/05/23 1905 12/27/22  2142 11/14/22 2220 10/15/22  2319 10/12/22  0944 10/08/22  2013 09/24/22  1958 09/04/22  1931 07/09/22  1642 02/08/22  0551 11/07/20 1945   ALBUMIN  --  3.6 4.0 4.2 3.9   < > 4.1   < >  --    < >  --   --    BILITOTAL  --  0.2 0.2 0.2 0.2   < > <0.2   < >  --    < >  --   --    ALT  --  30 27 30 40   < > 35   < >  --    < >  --   --    AST  --  24 25 41* 36  --  34   < >  --    < >  --   --    PROTEIN 30*  --   --   --   --   --   --   --  Negative  --   --  Negative   LIPASE  --  26  --   --  25  --  28   < >  --    < >  --   --    AMYLASE  --   --   --   --   --   --   --   --   --   --  29  --     < > = values in this interval not displayed.       I reviewed the patient's new imaging results.        CONSULTATION ASSESSMENT AND PLAN:    Nevin Felixberger is a 32 year old female with c/o foreign body ingestion    Foreign body ingestion  Swallowed bread bag twist tie  Present in stomach on xray  Has had 18 prior EGDs this year for similar complaints    -- EGD today  -- NPO    [facility].  Office: [phone]  Cell : [phone] (Dr. Griffin)  Cell: [phone] (Mavis Leahy PA-C)

## 2023-01-20 NOTE — PHARMACY-ADMISSION MEDICATION HISTORY
Pharmacy Medication History  Admission medication history interview status for the 1/19/2023  admission is complete. See EPIC admission navigator for prior to admission medications     Location of Interview: Patient room  Medication history sources: Patient, Surescripts and Patient binder with medication information    Significant changes made to the medication list:  -She initially reported hydroxychloroquine as 1 tablet BID at baseline and increase to 2 tablet BID with acute flare then later in conversation clarified hydroxychloroquine to be only once daily so 1 tablet daily at baseline and 2 tablets once daily with acute flare - she reports currently having acute flare and increased to 2 tablets last week.  -She clarified olanzapine to currently be 1.25mg every day at 5pm    In the past week, patient estimated taking medication this percent of the time: greater than 90%    Additional medication history information:   -Meclizine new at ED visit 1/15/23 - she has not started yet  -Methocarbamol new at ED visit 1/5/23 - she reports taking a couple weeks ago.  -Office visit 1/12/23 indicated may start semaglutide for management of diabetes and weight loss - appears to be waiting for insurance authorization.    Medication reconciliation completed by provider prior to medication history? No    Time spent in this activity: 30 min    Prior to Admission medications    Medication Sig Last Dose Taking? Auth Provider Long Term End Date   albuterol (PROAIR HFA/PROVENTIL HFA/VENTOLIN HFA) 108 (90 Base) MCG/ACT inhaler Inhale 2 puffs into the lungs every 6 hours as needed for shortness of breath / dyspnea or wheezing prn Yes Nish Saucedo MD Yes    albuterol (PROVENTIL) (2.5 MG/3ML) 0.083% neb solution Take 2.5 mg by nebulization every 6 hours as needed for shortness of breath / dyspnea or wheezing prn Yes Unknown, Entered By History Yes    alum & mag hydroxide-simethicone (MAALOX MAX) 400-400-40 MG/5ML SUSP suspension Take  15 mLs by mouth every 6 hours as needed for heartburn or other (sore throat) prn Yes Inessa Barlow MD     BANOPHEN 2-0.1 % external cream Apply 1 applicator topically 2 times daily as needed for itching prn Yes Unknown, Entered By History     brexpiprazole (REXULTI) 2 MG tablet Take 2 mg by mouth every evening 1/18/2023 at 8pm Yes Unknown, Entered By History     busPIRone (BUSPAR) 10 MG tablet Take 2 tablets (20 mg) by mouth 3 times daily 1/19/2023 at x2 Yes Marian Ledesma MD Yes    cetirizine (ZYRTEC) 10 MG tablet Take 1 tablet (10 mg) by mouth daily  Patient taking differently: Take 10 mg by mouth every evening 1/18/2023 at 8pm Yes Marian Ledesma MD     Cholecalciferol (D3 HIGH POTENCY) 25 MCG (1000 UT) CAPS Take 50 mcg by mouth daily 1/19/2023 at am Yes Unknown, Entered By History No    desvenlafaxine (PRISTIQ) 100 MG 24 hr tablet Take 1 tablet (100 mg) by mouth daily 1/19/2023 at am Yes Marian Ledesma MD Yes    ferrous sulfate (FEROSUL) 325 (65 Fe) MG tablet Take 1 tablet (325 mg) by mouth daily (with breakfast) 1/19/2023 at am Yes Marian Ledesma MD     fluocinonide (LIDEX) 0.05 % external cream Apply 1 Application topically See Admin Instructions Apply thinly to knee 2 times daily, Monday through Fridays, off on weekends as needed. Avoid face and skin folds.  Yes Unknown, Entered By History     furosemide (LASIX) 20 MG tablet Take 20 mg by mouth daily 1/19/2023 at am Yes Reported, Patient Yes    hydroxychloroquine (PLAQUENIL) 200 MG tablet Take 1 tablet (200 mg) by mouth 2 times daily  Patient taking differently: Take 400 mg by mouth daily 1/19/2023 at am Yes Marian Ledesma MD     ibuprofen (ADVIL/MOTRIN) 600 MG tablet Take 1 tablet (600 mg) by mouth every 8 hours as needed for moderate pain prn Yes Madeline Hartmann MD No    medroxyPROGESTERone (PROVERA) 10 MG tablet Take 1 tablet (10 mg) by mouth daily 1/19/2023 at am Yes Karime More MD     metFORMIN (GLUCOPHAGE XR) 500 MG 24 hr tablet  Take 1,000 mg by mouth daily (with dinner) Take at 5 pm. 1/19/2023 at 5pm Yes Unknown, Entered By History Yes    methocarbamol (ROBAXIN) 500 MG tablet Take 1 tablet (500 mg) by mouth 4 times daily as needed Past Month at couple weeks ago Yes Brice Goncalves DO     montelukast (SINGULAIR) 10 MG tablet Take 10 mg by mouth every evening 1/18/2023 at 8pm Yes Reported, Patient Yes    OLANZapine (ZYPREXA) 2.5 MG tablet Take 1.25 mg by mouth daily (with dinner) At 5pm 1/19/2023 at 5pm Yes Reported, Patient No    ondansetron (ZOFRAN-ODT) 4 MG ODT tab Take 1 tablet (4 mg) by mouth every 8 hours as needed for nausea prn Yes Marian Ledesma MD No    pregabalin (LYRICA) 100 MG capsule Take 1 capsule (100 mg) by mouth 3 times daily 1/19/2023 at x2 Yes Marian Ledesma MD Yes    SUMAtriptan (IMITREX) 25 MG tablet Take 25 mg by mouth at onset of headache for migraine May repeat in 2 hours. prn Yes Reported, Patient     valACYclovir (VALTREX) 1000 mg tablet Take 2 tablets by mouth two times daily for one day. Use as needed at onset of cold sore.  prn Yes Unknown, Entered By History     meclizine (ANTIVERT) 25 MG tablet Take 1 tablet (25 mg) by mouth every 6 hours as needed for dizziness Not Started at New 1/15/23  Reece Flynn MD     Respiratory Therapy Supplies (NEBULIZER) BRENDAN Nebulizer device.  Albuterol nebulization every 2 hours as needed for shortness of breath or wheezing.   Nish Saucedo MD         The information provided in this note is only as accurate as the sources available at the time of update(s)

## 2023-01-20 NOTE — ANESTHESIA POSTPROCEDURE EVALUATION
Patient: Nevin Alvarado    Procedure: Procedure(s):  ESOPHAGOGASTRODUODENOSCOPY (EGD)       Anesthesia Type:  MAC    Note:  Disposition: Outpatient   Postop Pain Control: Uneventful            Sign Out: Well controlled pain   PONV: No   Neuro/Psych: Uneventful            Sign Out: Acceptable/Baseline neuro status   Airway/Respiratory: Uneventful            Sign Out: Acceptable/Baseline resp. status   CV/Hemodynamics: Uneventful            Sign Out: Acceptable CV status   Other NRE: NONE   DID A NON-ROUTINE EVENT OCCUR? No    Event details/Postop Comments:  Pt doing well, dressed and waiting for ride home.           Last vitals:  Vitals Value Taken Time   /78 01/20/23 1228   Temp     Pulse 101 01/20/23 1228   Resp 22 01/20/23 1228   SpO2 94 % 01/20/23 1228   Vitals shown include unvalidated device data.    Electronically Signed By: Alfred Townsend MD  January 20, 2023  2:00 PM

## 2023-01-20 NOTE — CONSULTS
Diagnostic Evaluation Consultation  Crisis Assessment    Patient was assessed: In Person  Patient location: [place] [facility]  Was a release of information signed: No. Reason: Guardian not present      Referral Data and Chief Complaint  Nevin is a 32 year old, who uses she/her pronouns, and presents to the ED via EMS. Patient is referred to the ED by self. Patient is presenting to the ED for the following concerns: swallowed foreign body, psychiatric evaluation.      Informed Consent and Assessment Methods     Patient is under the [facility]hip [facility] Aaliyah Martell.  Writer met with patient and explained the crisis assessment process, including applicable information disclosures and limits to confidentiality, assessed understanding of the process, and obtained consent to proceed with the assessment. Patient was observed to be able to participate in the assessment as evidenced by acknowledged role of Writer and crisis assessment and responded appropriately to verbal prompts. Assessment methods included conducting a formal interview with patient, review of medical records, collaboration with medical staff, and obtaining relevant collateral information from family and community providers when available.    Over the course of this crisis assessment provided reassurance, offered validation, engaged patient in problem solving and disposition planning and worked with patient on safety and aftercare planning. Patient's response to interventions was calm, cooperative, and engaged.     Summary of Patient Situation    Pt is a 32 year old female with she/her pronouns with a notable history of BPD, MDD, CANDICE, PTSD, and ADHD who presents to the ED with chief complaint of swallowed foreign body and psychiatric evaluation.  Pt reports on the night of Wednesday, 01/18/2023, Pt had difficulty sleeping due to having thoughts and dreams of wanting to ingest something. Pt reports she then had the thought ruminating  "throughout the day (Thursday, 01/19/2023) and was attempting to utilize coping skills and strategies to refrain from acting upon her thoughts of ingesting something. Pt reports she met with her therapist on Thursday, the 19th, and felt as though she was able to continue to manage the NSSIB of ingestion. While at home, after cooking dinner, Pt located a metal twist tie that was on her tortilla packaging and ingested it. Pt reports it was an impulsive decision and believes it likely happened due to having recently had thoughts about doing it. Pt reports she informed staff regarding the ingestion and Pt called 911 to be brought to the ED. Pt reports an extensive history of hospital and ED visits for similar presentation of ingesting foreign objects. Pt identified tonight as impulsive and does not attribute it as a symptom of her mental health declining. Pt denies any other mental health concerns at this time. Pt denies any SI, HI, AH/VH, or active substance use. Pt reports a history of ingesting items and also reports she may have been diagnosed with Pica. Pt reports experiencing chronic \"passive\" thoughts of wanting to self-harm by ingesting things. Pt reports a history of NSSIB of cutting yet has not engaged in it \"in a long time.\" Pt reports currently working with a psychiatrist, therapist for individual and DBT programming, a , staff at her house, and her guardian. Pt reports believing overall she is in a good place mentally and hopes to be able to return home once medically cleared.    Brief Psychosocial History     Pt reports she is currently living in a group run by [employer] and has been since October 2022. Pt reports having 1:1 staffing in order to ensure safety regarding ingesting items, assisting with her ADLS, and medication administration. Pt reports living with another house member and they have 1:1 staffing as well. Pt denies any concerns with her current living situation. Pt reports a family " history of siblings and parents, and reports her mother has MDD and CANDICE. Pt reports receiving CADI waiver for services. Pt denies any relevant legal issues. Pt did not disclose any cultural, Yarsani, or spiritual influences on her mental health care.    Significant Clinical History    Pt reports historical mental health diagnoses of BPD, MDD, CANDICE, PTSD from 2014 and ADHD in 2016. Pt denies any historical substance use crises. Pt reports historical symptoms of NSSIB, lack of energy, sleep disturbance, change in appetite, and suicidal ideation. Pt reports she has worked with various psychiatrist and therapists in the past. Pt reports an extensive history of ED visits and hospitalizations due to her mental health. Pt endorses a history of commitments, with the most recent being around 2015/2016 in [facility], engaging in IOP/PHP, and is currently in a DBT program. Pt did not disclose any trauma history.      Collateral Information  The following information was received from Aaliyah Martell whose relationship to the patient is [employer] [facility]/Guardian. Information was obtained via phone. Their phone number is [phone] and they last had contact with patient on n/a.     What happened today: She shared being informed by the doctor when Pt was at the ED for swallowing something and expressed having suicidal ideation to the ED doctor.     What is different about patient's functioning: She shared there has been no reported or observed difference in Pt's functioning.     Concern about alcohol/drug use: No     What do you think the patient needs: To be medically cleared and then discharged back home.     Has patient made comments about wanting to kill themselves/others:  No     If d/c is recommended, can they take part in safety/aftercare planning: Yes She is Pt's guardian.     Other information: She shared this is a common occurrence. She shared if deemed safe to discharge by the ED, she would approve of the  disposition. She shared Pt has an extensive care team and knows how to seek out support when needed.       Risk Assessment  [place] Suicide Severity Rating Scale Full Clinical Version: 01/20/2023  Suicidal Ideation  1. Wish to be Dead (Lifetime): Yes  1. Wish to be Dead (Past 1 Month): No  2. Non-Specific Active Suicidal Thoughts (Lifetime): Yes  2. Non-Specific Active Suicidal Thoughts (Past 1 Month): No  3. Active Suicidal Ideation with any Methods (Not Plan) Without Intent to Act (Lifetime): Yes  3. Active Suicidal Ideation with any Methods (Not Plan) Without Intent to Act (Past 1 Month): No  4. Active Suicidal Ideation with Some Intent to Act, Without Specific Plan (Lifetime): Yes  4. Active Suicidal Ideation with Some Intent to Act, Without Specific Plan (Past 1 Month): No  5. Active Suicidal Ideation with Specific Plan and Intent (Lifetime): Yes  5. Active Suicidal Ideation with Specific Plan and Intent (Past 1 Month): No  Intensity of Ideation  Most Severe Ideation Rating (Lifetime): 5  Most Severe Ideation Rating (Past 1 Month): 1  Frequency (Lifetime): Less than once a week  Frequency (Past 1 Month): Less than once a week  Duration (Lifetime): Fleeting, few seconds or minutes  Duration (Past 1 Month): Fleeting, few seconds or minutes  Controllability (Lifetime): Unable to control thoughts  Controllability (Past 1 Month): Easily able to control thoughts  Deterrents (Lifetime): Deterrents definitely did not stop you  Deterrents (Past 1 Month): Does not apply  Reasons for Ideation (Lifetime): Completely to get attention, revenge, or a reaction from others  Reasons for Ideation (Past 1 Month): Does not apply  Suicidal Behavior  Actual Attempt (Lifetime): Yes  Total Number of Actual Attempts (Lifetime): 1  Actual Attempt Description (Lifetime):  (taking all of her medication in the lobby of [facility].)  Actual Attempt (Past 3 Months): No  Has subject engaged in non-suicidal self-injurious behavior?  (Lifetime): Yes  Has subject engaged in non-suicidal self-injurious behavior? (Past 3 Months): Yes  Interrupted Attempts (Lifetime): No  Aborted or Self-Interrupted Attempt (Lifetime): No  Preparatory Acts or Behavior (Lifetime): No  C-SSRS Risk (Lifetime/Recent)  Calculated C-SSRS Risk Score (Lifetime/Recent): Moderate Risk        Actual/Potential Lethality (Most Lethal Attempt)  Most Lethal Attempt Date:  (Pt reports attempt in 2015/2016)  Actual Lethality/Medical Damage Code (Most Lethal Attempt): Severe physical damage, medical hospitalization with intensive care required  Potential Lethality Code (Most Lethal Attempt): Behavior likely to result in injury but not likely to cause death       Validity of evaluation is not impacted by presenting factors during interview.   Comments regarding subjective versus objective responses to Dennard tool: na  Environmental or Psychosocial Events: impulsivity/recklessness  Chronic Risk Factors: history of suicide attempts (1), history of psychiatric hospitalization, serious, persistent mental illness, personality disorders and history of Non-Suicidal Self Injury (NSSI)   Warning Signs: seeking access to means to hurt or kill self, acting reckless or engaging in risky activities, withdrawing from friends, family, and society and engaging in self-destructive behavior  Protective Factors: strong bond to family unit, community support, or employment, lives in a responsibly safe and stable environment, good treatment engagement, sense of importance of health and wellness, able to access care without barriers, supportive ongoing medical and mental health care relationships and help seeking  Interpretation of Risk Scoring, Risk Mitigation Interventions and Safety Plan:  After mental health crisis assessment and aftercare planning by EmPATH care team and consultation with attending provider, the patient's circumstances and mental state were safe for outpatient management. Patient  denies any mental health or safety concerns at this time. Close follow-up with established care team consisting of psychiatry, therapy, case management, and group home staff is recommended and sleep hygiene and nightmare protocol resources were provided. Patient is to return to the ED if any urgent or potentially life-threatening concerns arise.        At the time of discharge, the patient's acute suicide risk was determined to be low due to the following factors: reduction in the intensity of mood/anxiety symptoms that preceded the admission, denial of suicidal thoughts, denies feeling helpless or hopeless, not currently under the influence of alcohol or illicit substances, denies experiencing command hallucinations and no immediate access to firearms. Protective factors include: social support, displays resiliency , future focused thinking, displays insight, and safe/stable housing.      Does the patient have thoughts of harming others? No     Is the patient engaging in sexually inappropriate behavior?  no        Current Substance Abuse     Is there recent substance abuse? no     Was a urine drug screen or blood alcohol level obtained: No       Mental Status Exam     Affect: Appropriate   Appearance: Appropriate    Attention Span/Concentration: Attentive  Eye Contact: Engaged   Fund of Knowledge: Appropriate    Language /Speech Content: Fluent   Language /Speech Volume: Normal    Language /Speech Rate/Productions: Normal    Recent Memory: Intact   Remote Memory: Intact   Mood: Normal    Orientation to Person: Yes    Orientation to Place: Yes   Orientation to Time of Day: Yes    Orientation to Date: Yes    Situation (Do they understand why they are here?): Yes    Psychomotor Behavior: Normal    Thought Content: Clear   Thought Form: Intact      History of commitment: Yes Pt reports history of commitment with most recent being in 9001-3405 in [facility]. Pt reports she is not currently on a commitment.        Medication    Psychotropic medications: Yes. Pt is currently taking see below. Medication compliant: Yes. Recent medication changes: No  Medication changes made in the last two weeks: No  Current Facility-Administered Medications   Medication     albuterol (PROVENTIL HFA/VENTOLIN HFA) inhaler     albuterol (PROVENTIL) neb solution 2.5 mg     brexpiprazole (REXULTI) tablet 2 mg     busPIRone (BUSPAR) tablet 20 mg     cetirizine (zyrTEC) tablet 10 mg     desvenlafaxine (PRISTIQ) 24 hr tablet 100 mg     furosemide (LASIX) tablet 20 mg     medroxyPROGESTERone (PROVERA) tablet 10 mg     metFORMIN (GLUCOPHAGE XR) 24 hr tablet 1,000 mg     montelukast (SINGULAIR) tablet 10 mg     OLANZapine (zyPREXA) half-tab 1.25 mg     Current Outpatient Medications   Medication     albuterol (PROAIR HFA/PROVENTIL HFA/VENTOLIN HFA) 108 (90 Base) MCG/ACT inhaler     albuterol (PROVENTIL) (2.5 MG/3ML) 0.083% neb solution     alum & mag hydroxide-simethicone (MAALOX MAX) 400-400-40 MG/5ML SUSP suspension     BANOPHEN 2-0.1 % external cream     brexpiprazole (REXULTI) 2 MG tablet     busPIRone (BUSPAR) 10 MG tablet     cetirizine (ZYRTEC) 10 MG tablet     Cholecalciferol (D3 HIGH POTENCY) 25 MCG (1000 UT) CAPS     desvenlafaxine (PRISTIQ) 100 MG 24 hr tablet     ferrous sulfate (FEROSUL) 325 (65 Fe) MG tablet     fluocinonide (LIDEX) 0.05 % external cream     furosemide (LASIX) 20 MG tablet     hydroxychloroquine (PLAQUENIL) 200 MG tablet     ibuprofen (ADVIL/MOTRIN) 600 MG tablet     medroxyPROGESTERone (PROVERA) 10 MG tablet     metFORMIN (GLUCOPHAGE XR) 500 MG 24 hr tablet     methocarbamol (ROBAXIN) 500 MG tablet     montelukast (SINGULAIR) 10 MG tablet     OLANZapine (ZYPREXA) 2.5 MG tablet     ondansetron (ZOFRAN-ODT) 4 MG ODT tab     pregabalin (LYRICA) 100 MG capsule     SUMAtriptan (IMITREX) 25 MG tablet     valACYclovir (VALTREX) 1000 mg tablet     meclizine (ANTIVERT) 25 MG tablet     Respiratory Therapy Supplies (NEBULIZER)  BRENDAN          Current Care Team    Primary Care Provider: Yes. Name: Latonya Knight MD. Location: [facility]. Date of last visit: 01/12/2023. Frequency: see chart. Perceived helpfulness: yes.  Psychiatrist: Yes. Name: Brionna De La Rosa MD. Location: [facility]. Date of last visit: NA. Frequency: NA. Perceived helpfulness: yes.  Therapist: Yes. Name: Nancy Luo LM. Location: [facility]. Date of last visit: 01/19/2023. Frequency: weekly. Perceived helpfulness: yes.  : Yes. Name: Hollie. Location: [place]. Date of last visit: na. Frequency: na. Perceived helpfulness: yes.     CTSS or ARMHS: No  ACT Team: No  Other: No      Diagnosis    311 (F32.9) Unspecified Depressive Disorder  - by history   300.00 (F41.9) Unspecified Anxiety Disorder - by history   301.83 (F60.3) Borderline Personality Disorder - primary and - by history   309.81 (F43.10) Posttraumatic Stress Disorder (includes Posttraumatic Stress Disorder for Children 6 Years and Younger)  Without dissociative symptoms - by history     Clinical Summary and Substantiation of Recommendations    After mental health crisis assessment and aftercare planning by EmPATH care team and consultation with attending provider, the patient's circumstances and mental state were safe for outpatient management. Patient denies any mental health or safety concerns at this time. Close follow-up with established care team consisting of psychiatry, therapy, case management, and group home staff is recommended and sleep hygiene and nightmare protocol resources were provided. Patient is to return to the ED if any urgent or potentially life-threatening concerns arise.        At the time of discharge, the patient's acute suicide risk was determined to be low due to the following factors: reduction in the intensity of mood/anxiety symptoms that preceded the admission, denial of suicidal thoughts, denies feeling helpless or  hopeless, not currently under the influence of alcohol or illicit substances, denies experiencing command hallucinations and no immediate access to firearms. Protective factors include: social support, displays resiliency , future focused thinking, displays insight, and safe/stable housing.    Disposition    Recommended disposition: Individual Therapy, Medication Management and Group Home: [facility] CRS       Reviewed case and recommendations with attending provider. Attending Name: Dr. Farah       Attending concurs with disposition: Yes       Patient concurs with disposition: Yes       Guardian concurs with disposition: Yes      Final disposition: Individual therapy , Medication management and Group home: [facility] .     Inpatient Details (if applicable): NA    Outpatient Details (if applicable):   Aftercare plan and appointments placed in the AVS and provided to patient: Yes. Given to patient by ED RN upon discharge.    Was lethal means counseling provided as a part of aftercare planning? Yes - describe Pt does not have access to firearms and her medication is managed by staff;       Assessment Details    Patient interview started at: 2330 and completed at: 0015.     Total duration spent on the patient case in minutes: 1.0 hrs      CPT code(s) utilized: 52373 - Psychotherapy for Crisis - 60 (30-74*) min       Abner Holley [facility], Psychotherapist  [facility] - Triage & Transition Services  Callback: [phone]      Aftercare Plan  If I am feeling unsafe or I am in a crisis, I will:   Contact my established care providers   Call the National Suicide Prevention Lifeline: 988  Go to the nearest emergency room   Call 716     Warning signs that I or other people might notice when a crisis is developing for me: Leg shaking, laying in bed all day, isolating.     Things I am able to do on my own to cope or help me feel better: Painting, video games, music, organizing, cleaning things.      Things that I am able to do with others to  "cope or help me better: Walk, watching a movie, cooking.      Things I can use or do for distraction: Music, video games, going for a walk.      Changes I can make to support my mental health and wellness: Diet change, exercise more.      People in my life that I can ask for help: Family, sister, friend, staff.      Your [facility] has a mental health crisis team you can call 24/7: [facility] Crisis Line Number: [phone]     Other things that are important when I m in crisis: Notice my warning signs, and approach me in order to assist me. I may not seek out support when I am experiencing a crisis. Assist with being a problem solver.      Additional resources and appointment information: Continue to work with established outpatient mental health providers. Provided DBT exercises for sleep hygiene and nightmares.      Crisis Lines  Crisis Text Line  Text 167935  You will be connected with a trained live crisis counselor to provide support.     [place] Hotline  1.192.333.hope [4673]     National Hope Line  1.800.SUICIDE [1372259]     National Suicide Prevention Lifeline  Free and confidential support  1.125.311.TALK [3155]  http://suicidepreventionlifeline.org     The [place] Project (LGBTQ Youth Crisis Line)  1.717.609.6413  text START to 502-029     [place]'s Crisis Line  6.934.910.7736 (Press 1)  or text 570410     Community Resources  Fast Tracker  Linking people to mental health and substance use disorder resources  fasttrackermn.org      [place] Mental Health Warm Line  Peer to peer support  Monday thru Saturday, 12 pm to 10 pm  400.187.9681 or 5.524.742.6568  Text \"Support\" to 94119     National [place] on Mental Illness (ELIJAH)  788.650.8526 or 1.888.ELIJAH.HELPS     [facility] Counseling Center  Free mental health counseling  [address] [facility]  051.505.4412     Mental Health Apps  My3  https://myPadinmotionpp.org/     Virtual[employer] CorporationeBox  " https://Educreations/apps/virtual-hope-box/     Suicide Safety Plan (Cognia)     Calm Harm        Additional information:  Today you were seen by a licensed mental health professional through Triage and Transition services, [facility] ([facility])  for a crisis assessment in the Emergency Department at [facility].  It is recommended that you follow up with your established providers (psychiatrist, mental health therapist, and/or primary care doctor - as relevant) as soon as possible. Coordinators from [facility] will be calling you in the next 24-48 hours to ensure that you have the resources you need.  You can also contact [facility] coordinators directly at [phone]. You may have been scheduled for or offered an appointment with a mental health provider. [facility] maintains an extensive network of licensed behavioral health providers to connect patients with the services they need.  We do not charge providers a fee to participate in our referral network.  We match patients with providers based on a patient's specific needs, insurance coverage, and location.  Our first effort will be to refer you to a provider within your care system, and will utilize providers outside your care system as needed.

## 2023-01-20 NOTE — DISCHARGE INSTRUCTIONS
If I am feeling unsafe or I am in a crisis, I will:   Contact my established care providers   Call the National Suicide Prevention Lifeline: 988  Go to the nearest emergency room   Call 911     Discharge Instructions  Mental Health Concerns    You were seen today for mental health concerns, such as depression, anxiety, or suicidal thinking. Your provider feels that you do not require hospitalization at this time. However, your symptoms may become worse, and you may need to return to the Emergency Department. Most treatments of depression and suicidal thoughts are a process rather than a single intervention.  Medications and counseling can take several weeks or more to help.    Generally, every Emergency Department visit should have a follow-up clinic visit with either a primary or a specialty clinic/provider. Please follow-up as instructed by your emergency provider today.    By accepting these discharge instructions:  You promise to not harm yourself or others.  You agree that if you feel you are becoming unable to keep that promise, you will do something to help yourself before you do anything to harm yourself or others.   You agree to keep any safety plan arranged on your visit here today.  You agree to take any medication prescribed or recommended by your provider.  If you are getting worse, you can contact a friend or a family member, contact your counselor or family provider, contact a crisis line, or other options discussed with the provider or therapist today.  At any time, you can call 911 and return to the Emergency Department for more help.  You understand that follow-up is essential to your treatment, and you will make and keep appointments recommended on your visit today.    How to improve your mental health and prevent suicide:  Involve others by letting family, friends, counselors know.  Do not isolate yourself.  Avoid alcohol or drugs. Remove weapons, poisons from your home.  Try to stick to routines  for eating, sleeping and getting regular exercise.    Try to get into sunlight. Bright natural light not only treats seasonal affective disorder but also depression.  Increase safe activities that you enjoy.    If you feel worse, contact 2-516-TJUSLCC (1-111.917.8242), or call 911, or your primary provider/counselor for additional assistance.    If you were given a prescription for medicine here today, be sure to read all of the information (including the package insert) that comes with your prescription.  This will include important information about the medicine, its side effects, and any warnings that you need to know about.  The pharmacist who fills the prescription can provide more information and answer questions you may have about the medicine.  If you have questions or concerns that the pharmacist cannot address, please call or return to the Emergency Department.   Remember that you can always come back to the Emergency Department if you are not able to see your regular provider in the amount of time listed above, if you get any new symptoms, or if there is anything that worries you.          Warning signs that I or other people might notice when a crisis is developing for me: Leg shaking, laying in bed all day, isolating.    Things I am able to do on my own to cope or help me feel better: Painting, video games, music, organizing, cleaning things.     Things that I am able to do with others to cope or help me better: Walk, watching a movie, cooking.     Things I can use or do for distraction: Music, video games, going for a walk.     Changes I can make to support my mental health and wellness: Diet change, exercise more.     People in my life that I can ask for help: Family, sister, friend, staff.     Your [facility] has a mental health crisis team you can call 24/7: [facility] Crisis Line Number: [phone]    Other things that are important when I m in crisis: Notice my warning signs, and approach me in  "order to assist me. I may not seek out support when I am experiencing a crisis. Assist with being a problem solver.     Additional resources and appointment information: Continue to work with established outpatient mental health providers. Provided DBT exercises for sleep hygiene and nightmares.     Crisis Lines  Crisis Text Line  Text 809445  You will be connected with a trained live crisis counselor to provide support.    Gambling Hotline  1.800.333.hope [4673]    National Hope Line  1.800.SUICIDE [6999890]    National Suicide Prevention Lifeline  Free and confidential support  1.800.325.TALK [4355]  http://suicidepreventionlifeline.org    [employer] (LGBTQ Youth Crisis Line)  0.430.602.5066  text START to 438-338    's Crisis Line  5.592.792.9000 (Press 1)  or text 394258    [employer]  Fast Tracker  Linking people to mental health and substance use disorder resources  fastAvistan.org     [place] Mental Health Warm Line  Peer to peer support  Monday thru Saturday, 12 pm to 10 pm  462.349.7907 or 4.602.067.2344  Text \"Support\" to 67465    National [employer] Mental Illness (ELIJAH)  611.239.5643 or 1.888.ELIJAH.HELPS    [facility]  Free mental health counseling  [address]  009.537.3049    Mental Health Apps  My3  https://Bramasolpp.org/    VirtualHopeBox  https://[employer].org/apps/virtual-hope-box/    Suicide Safety Plan ([employer])    Calm Harm      Additional information:  Today you were seen by a licensed mental health professional through Triage and Transition services, [facility])  for a crisis assessment in the Emergency Department at [facility].  It is recommended that you follow up with your established providers (psychiatrist, mental health therapist, and/or primary care doctor - as relevant) as soon as possible. Coordinators from P will be calling you in the next 24-48 hours " to ensure that you have the resources you need.  You can also contact [facility] coordinators directly at [phone]. You may have been scheduled for or offered an appointment with a mental health provider. [facility] maintains an extensive network of licensed behavioral health providers to connect patients with the services they need.  We do not charge providers a fee to participate in our referral network.  We match patients with providers based on a patient's specific needs, insurance coverage, and location.  Our first effort will be to refer you to a provider within your care system, and will utilize providers outside your care system as needed.

## 2023-01-20 NOTE — ANESTHESIA CARE TRANSFER NOTE
Patient: eNvin Alvarado    Procedure: Procedure(s):  ESOPHAGOGASTRODUODENOSCOPY (EGD)       Diagnosis: Foreign body in esophagus [T18.108A]  Diagnosis Additional Information: No value filed.    Anesthesia Type:   MAC     Note:    Oropharynx: oropharynx clear of all foreign objects  Level of Consciousness: awake  Oxygen Supplementation: face mask  Level of Supplemental Oxygen (L/min / FiO2): 8  Independent Airway: airway patency satisfactory and stable  Dentition: dentition unchanged  Vital Signs Stable: post-procedure vital signs reviewed and stable  Report to RN Given: handoff report given  Patient transferred to: PACU    Handoff Report: Identifed the Patient, Identified the Reponsible Provider, Reviewed the pertinent medical history, Discussed the surgical course, Reviewed Intra-OP anesthesia mangement and issues during anesthesia, Set expectations for post-procedure period and Allowed opportunity for questions and acknowledgement of understanding      Vitals:  Vitals Value Taken Time   /49 01/20/23 1218   Temp     Pulse 95 01/20/23 1219   Resp 11 01/20/23 1219   SpO2 97 % 01/20/23 1219   Vitals shown include unvalidated device data.    Electronically Signed By: HU Roblero CRNA  January 20, 2023  12:20 PM

## 2023-01-20 NOTE — ANESTHESIA PREPROCEDURE EVALUATION
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 10/11/2020       Anesthesia Plan    ASA Status:  4   NPO Status:  NPO Appropriate    Anesthesia Type: MAC.              Consents    Anesthesia Plan(s) and associated risks, benefits, and realistic alternatives discussed. Questions answered and  patient/representative(s) expressed understanding.    - Discussed:     - Discussed with:  Patient      - Specific Concerns: Specific risks discussed (but not limited to): Possibility of intraoperative awareness..        Postoperative Care    Pain management: IV analgesics, Oral pain medications.   PONV prophylaxis: Ondansetron (or other 5HT-3), Dexamethasone or Solumedrol     Comments:                Alfred Townsend MD

## 2023-01-20 NOTE — ED NOTES
The following information was received from Aaliyah Martell whose relationship to the patient is [employer]/Guardian. Information was obtained via phone. Their phone number is [phone] and they last had contact with patient on n/a.    What happened today: She shared being informed by the doctor when Pt was at the ED for swallowing something and expressed having suicidal ideation to the ED doctor.    What is different about patient's functioning: She shared there has been no reported or observed difference in Pt's functioning.    Concern about alcohol/drug use: No    What do you think the patient needs: To be medically cleared and then discharged back home.    Has patient made comments about wanting to kill themselves/others:  No    If d/c is recommended, can they take part in safety/aftercare planning: Yes She is Pt's guardian.    Other information: She shared this is a common occurrence. She shared if deemed safe to discharge by the ED, she would approve of the disposition. She shared Pt has an extensive care team and knows how to seek out support when needed.

## 2023-01-20 NOTE — ED NOTES
Bed: [facility]  Expected date:   Expected time:   Means of arrival:   Comments:  Pt in Endo

## 2023-01-20 NOTE — ED PROVIDER NOTES
History     Chief Complaint:  Swallowed Foreign Body and Psychiatric Evaluation       The history is provided by the patient.      Nevin Alvarado is a 32 year old female who presents for swallowed foreign body and SI. She reports history of this, noting last night suicidal thoughts began and the patient wanted to harm herself. This lasted through the night and into today. She reports that this afternoon she swallowed a twist tie with metal wire. The patient reports current abdominal pain. She denies other symptoms. The patient reports that she has not been hospitalized for depression in a long time because this has been managed with medications. She mentions swallowing metal wires in the past, for which she has had endoscopies, [facility]    Independent Historian: EMS supplemented history      Review of External Notes: EMS report, outpatient notes      ROS:  Review of Systems   Gastrointestinal: Positive for abdominal pain.   Psychiatric/Behavioral: Positive for suicidal ideas.   All other systems reviewed and are negative.      Allergies:  Amoxicillin-Pot Clavulanate  Hydrocodone-Acetaminophen  Latex  Oseltamivir  Penicillins  Vancomycin  Hydrocodone  Blood-Group Specific Substance  Clavulanic Acid  Fentanyl  Naltrexone  Other Drug Allergy (See Comments)  Oxycodone  Adhesive Tape  Band-Aid Anti-Itch  Cephalosporins  Lamotrigine     Medications:    Albuterol  rexulti  Buspar  Pristiq  Ferosul  Lasix  Plaquenil  Zyprexa  Antivert  Metformin  Robaxin  singulair  Valtrex  Imitrex  Lyrica  Provera     Past Medical History:    ADD  Anorexia nervosa  Anxiety  Asthma  Borderline personality disorder  Depression   Suicide attempt  Self harm  PE  Neuropathy  PTSD  Sleep apnea  Suicidal overdose  Swallowed foreign body  Sleep apnea     Past Surgical History:    Abdomen surgery  EGD, many   Sigmoidoscopy x3  Release carpal tunnel x2    Family History:    Anxiety  Cerebrovascular  "disease  Type 2 diabetes mellitus  Depression    Social History:  The patient presents alone.   Arrived by ambulance.  PCP through [facility], also at [facility].   PCP: Latonya Knight     Physical Exam     Patient Vitals for the past 24 hrs:   BP Temp Temp src Pulse Resp SpO2 Height Weight   01/19/23 1949 (!) 142/80 98.9  F (37.2  C) Temporal 103 16 96 % -- --   01/19/23 1948 -- -- -- -- -- -- 1.575 m (5' 2\") 140.6 kg (310 lb)        Physical Exam  General: Alert, interactive in mild distress  Head:  Scalp is atraumatic  Eyes:  The pupils are equal, round, and reactive to light    EOM's intact    No scleral icterus  ENT:      Nose:  The external nose is normal  Ears:  External ears are normal  Mouth/Throat: The oropharynx is normal    Mucus membranes are moist       Neck:  Normal range of motion.      There is no rigidity.    Trachea is in the midline         CV:  Regular rate and rhythm    No murmur   Resp:  Breath sounds are clear bilaterally    Non-labored, no retractions or accessory muscle use      GI:  Abdomen is soft, no distension, mild left upper quadrant tenderness.       MS:  Normal strength in all 4 extremities  Skin:  Warm and dry, No rash or lesions noted.  Neuro: Strength 5/5 x4.     GCS: 15  Psych:  Awake. Alert. Endorses suicidal ideation.         Emergency Department Course     Imaging:  XR Abdomen 1 View   Final Result   IMPRESSION: Linear density overlies the upper lung, or perhaps 2 separate adjacent linear densities measuring up to 7-8 cm. Presumably these are ingested foreign bodies as they were not identified on previous exam. Uncertain as to what the object may be,    perhaps hairpin, among other etiologies. Cholecystectomy clips noted. No dilated air-filled loops of bowel.      Report per radiology    Laboratory:  Labs Ordered and Resulted from Time of ED Arrival to Time of ED Departure   HCG QUALITATIVE URINE - Normal       Result Value    hCG Urine Qualitative Negative   "   COVID-19 VIRUS (CORONAVIRUS) BY PCR - Normal    SARS CoV2 PCR Negative     DRUG ABUSE SCREEN 77 URINE (FL, RH, SH) - Normal    Amphetamines Urine Screen Negative      Barbituates Urine Screen Negative      Benzodiazepine Urine Screen Negative      Cannabinoids Urine Screen Negative      Opiates Urine Screen Negative      PCP Urine Screen Negative      Cocaine Urine Screen Negative          Emergency Department Course & Assessments:    PSS-3    Date and Time Over the past 2 weeks have you felt down, depressed, or hopeless? Over the past 2 weeks have you had thoughts of killing yourself? Have you ever attempted to kill yourself? When did this last happen? User   01/19/23 1946 yes yes yes more than 6 months ago FA      C-SSRS ([place])    Date and Time Q1 Wished to be Dead (Past Month) Q2 Suicidal Thoughts (Past Month) Q3 Suicidal Thought Method Q4 Suicidal Intent without Specific Plan Q5 Suicide Intent with Specific Plan Q6 Suicide Behavior (Lifetime) Within the Past 3 Months? RETIRED: Level of Risk per Screen Screening Not Complete User   01/19/23 1946 yes yes yes no no yes -- -- -- FA            Suicide assessment completed by mental health (D.E.C., LCSW, etc.)    Interventions:  Medications   albuterol (PROVENTIL HFA/VENTOLIN HFA) inhaler (has no administration in time range)   albuterol (PROVENTIL) neb solution 2.5 mg (has no administration in time range)   brexpiprazole (REXULTI) tablet 2 mg (has no administration in time range)   busPIRone (BUSPAR) tablet 20 mg (has no administration in time range)   cetirizine (zyrTEC) tablet 10 mg (has no administration in time range)   desvenlafaxine (PRISTIQ) 24 hr tablet 100 mg (has no administration in time range)   furosemide (LASIX) tablet 20 mg (has no administration in time range)   medroxyPROGESTERone (PROVERA) tablet 10 mg (has no administration in time range)   metFORMIN (GLUCOPHAGE XR) 24 hr tablet 1,000 mg (has no administration in time range)   montelukast  (SINGULAIR) tablet 10 mg (has no administration in time range)   OLANZapine (zyPREXA) half-tab 1.25 mg (has no administration in time range)        Independent Interpretation (X-rays, CTs, rhythm strip):  I reviewed the patient's x-ray.     Assessments:  1950 I obtained history and examined the patient as noted above.  2238 I called the patient's guardian, Janie, regarding updates.      Consultations/Discussion of Management or Tests:  2027 I consulted with Dr. Griffin GI, regarding the patient's history and presentation in the emergency department today. Plan for scope in the morning.     0030 I discussed the case with Abner from the mental health service who states that the patient can safely be discharged home and is placed discharge instructions in the discharge summary.    Social determinants of health:  Patient has significant underlying mental health disorder leading to frequent swallowing behavior    Disposition:  Care of the patient was transferred to my colleague Dr. Huddleston pending DEC assessment and reevaluation in the morning.     Impression & Plan      Medical Decision Making:  Following presentation history and physical examination were performed, previous records were reviewed.  I spoke with the patient's guardian and they provided consent for treatment.  Given he swallowed foreign body I spoke with the on-call gastroenterologist who has recommended keeping the patient n.p.o. overnight repeating her radiograph in the a.m. and possible endoscopy if the foreign body remains.  Patient relays suicidal ideation which has been a quite chronic issue.  She was evaluated by Abner from the mental health service who feels comfortable with her discharge to home and continued outpatient cares.  Care was turned over to my partner pending overnight observation.    Home medications have been ordered.      Diagnosis:    ICD-10-CM    1. Swallowed foreign body, initial encounter  T18.9XXA       2. Suicidal ideation   R45.851            Discharge Medications:  New Prescriptions    No medications on file      Scribe Disclosure:  I, Candy Flores, am serving as a scribe at 7:53 PM on 1/19/2023 to document services personally performed by Brent Fraah MD based on my observations and the provider's statements to me.    1/19/2023   Brent Farah MD Trigger, Brandon Thomas, MD  01/19/23 [id number]       Brent Farah MD  01/19/23 0066       Brent Farah MD  01/20/23 0103

## 2023-01-20 NOTE — ED PROVIDER NOTES
The patient's care was signed out to me by Dr. Huddleston with the plan that the patient will have endoscopy procedure to remove the twist tie foreign body that she swallowed and then be discharged afterwards.  DEC had evaluated the patient and does not feel that she requires inpatient mental health or empath evaluation, and she was reportedly cleared from a mental health perspective at this time.  I spoke with her after her endoscopy procedure and she denies any suicidal ideations.  She states she had vague thoughts of self-harm earlier but no longer feels like harming herself.  I feel she can be safely discharged home.  Christine the nurse is calling the patient's guardian and facilitating discharge.     Tianna Villafuerte MD  01/20/23 1257

## 2023-01-22 NOTE — PROGRESS NOTES
Observation goals PRIOR TO DISCHARGE     Comments:     -diagnostic tests and consults completed and resulted: not met- x-ray ordered this shift, needs to be completed.  -vital signs normal or at patient baseline: Goal met  -tolerating oral intake to maintain hydration: not met- PO intake improving, ate 75% of breakfast (chicken broth & boost drink)  - CT surgery eval- Not met    Nurse to notify provider when observation goals have been met and patient is ready for discharge.           Status post myomectomy

## 2023-01-23 ENCOUNTER — NURSE TRIAGE (OUTPATIENT)
Dept: NURSING | Facility: CLINIC | Age: 33
End: 2023-01-23
Payer: COMMERCIAL

## 2023-01-24 NOTE — TELEPHONE ENCOUNTER
"Nevin reports just prior to calling, she discovered she has a whole in her nasal septum.  She is not sure if this just happened or how long it has existed    There was bleeding which has now stopped  - Cleans nose regularly with Q-tips due to excessive crusting in nose  - Nose has been \"bleeding on & off lately\" - mainly at night when asleep  - Tip of nose is tender - not severe    Denies Cocaine use, Use of nasal medications, septal piercing, hx of nasal surgery    Advised to see PCP within 24 hours  Care Advice reviewed    Also instructed:  - Do not use Q-tips in the nostrils  - Use a humidifier  - Use Saline Nasal spray &/or Saline Nasal irrigation  - Consider nasal moisturizer - Gel, Gel spray, Petroleum jelly    Quynh Ruff RN  [facility]      Reason for Disposition    Tip of nose is very tender    Additional Information    Negative: [1] Major bleeding (e.g., actively dripping or spurting) AND [2] can't be stopped    Negative: Fainted or too weak to stand following large blood loss    Negative: Knocked out (unconscious) > 1 minute    Negative: Difficult to awaken or acting confused (e.g., disoriented, slurred speech)    Negative: Severe neck pain    Negative: Sounds like a life-threatening emergency to the triager    Negative: [1] Nosebleed AND [2] won't stop after 10 minutes of pinching the nostrils closed (applied twice)    Negative: [1] Bleeding from wound AND [2] won't stop after 10 minutes of direct pressure (using correct technique)    Negative: Skin is split open or gaping  (or length > 1/4 inch or 6 mm)    Negative: Very deformed or crooked nose    Negative: Sounds like a serious injury to the triager    Negative: Clear fluid is dripping from the nose    Negative: [1] SEVERE pain AND [2] not improved 2 hours after pain medicine/ice packs    Negative: Black and blue skin around both eyes (i.e., bilateral periorbital ecchymosis)    Negative: Breathing through the nose is " blocked on one side or both sides    Negative: [1] After day 2 AND [2] nose pain becomes worse AND [3] unexplained fever occurs    Negative: Suspicious history for the injury    Protocols used: NOSE INJURY-A-AH

## 2023-01-30 ENCOUNTER — HOSPITAL ENCOUNTER (OUTPATIENT)
Facility: CLINIC | Age: 33
Setting detail: OBSERVATION
Discharge: HOME | End: 2023-01-31
Attending: STUDENT IN AN ORGANIZED HEALTH CARE EDUCATION/TRAINING PROGRAM | Admitting: STUDENT IN AN ORGANIZED HEALTH CARE EDUCATION/TRAINING PROGRAM
Payer: COMMERCIAL

## 2023-01-30 ENCOUNTER — APPOINTMENT (OUTPATIENT)
Dept: RADIOLOGY | Facility: CLINIC | Age: 33
End: 2023-01-30
Attending: STUDENT IN AN ORGANIZED HEALTH CARE EDUCATION/TRAINING PROGRAM
Payer: COMMERCIAL

## 2023-01-30 ENCOUNTER — VIRTUAL VISIT (OUTPATIENT)
Dept: GASTROENTEROLOGY | Facility: CLINIC | Age: 33
End: 2023-01-30
Payer: COMMERCIAL

## 2023-01-30 DIAGNOSIS — K22.3 ESOPHAGEAL PERFORATION: ICD-10-CM

## 2023-01-30 DIAGNOSIS — R13.19 ESOPHAGEAL DYSPHAGIA: ICD-10-CM

## 2023-01-30 DIAGNOSIS — T18.9XXS SWALLOWED FOREIGN BODY, SEQUELA: ICD-10-CM

## 2023-01-30 DIAGNOSIS — T18.5XXA FOREIGN BODY OF RECTUM, INITIAL ENCOUNTER: ICD-10-CM

## 2023-01-30 DIAGNOSIS — K21.9 GASTROESOPHAGEAL REFLUX DISEASE, UNSPECIFIED WHETHER ESOPHAGITIS PRESENT: Primary | ICD-10-CM

## 2023-01-30 DIAGNOSIS — E66.01 MORBID OBESITY WITH BMI OF 40.0-44.9, ADULT (H): ICD-10-CM

## 2023-01-30 DIAGNOSIS — R19.7 DIARRHEA, UNSPECIFIED TYPE: ICD-10-CM

## 2023-01-30 PROCEDURE — G0378 HOSPITAL OBSERVATION PER HR: HCPCS

## 2023-01-30 PROCEDURE — 99215 OFFICE O/P EST HI 40 MIN: CPT | Mod: 95 | Performed by: INTERNAL MEDICINE

## 2023-01-30 PROCEDURE — 99222 1ST HOSP IP/OBS MODERATE 55: CPT | Performed by: HOSPITALIST

## 2023-01-30 PROCEDURE — C9803 HOPD COVID-19 SPEC COLLECT: HCPCS

## 2023-01-30 PROCEDURE — 250N000013 HC RX MED GY IP 250 OP 250 PS 637: Performed by: STUDENT IN AN ORGANIZED HEALTH CARE EDUCATION/TRAINING PROGRAM

## 2023-01-30 PROCEDURE — 250N000011 HC RX IP 250 OP 636: Performed by: STUDENT IN AN ORGANIZED HEALTH CARE EDUCATION/TRAINING PROGRAM

## 2023-01-30 PROCEDURE — 99285 EMERGENCY DEPT VISIT HI MDM: CPT | Mod: 25

## 2023-01-30 PROCEDURE — 74018 RADEX ABDOMEN 1 VIEW: CPT

## 2023-01-30 RX ORDER — ACETAMINOPHEN 325 MG/1
975 TABLET ORAL EVERY 8 HOURS PRN
Status: DISCONTINUED | OUTPATIENT
Start: 2023-01-30 | End: 2023-01-31 | Stop reason: HOSPADM

## 2023-01-30 RX ORDER — ACETAMINOPHEN 325 MG/1
650 TABLET ORAL ONCE
Status: COMPLETED | OUTPATIENT
Start: 2023-01-30 | End: 2023-01-30

## 2023-01-30 RX ORDER — ONDANSETRON 4 MG/1
4 TABLET, ORALLY DISINTEGRATING ORAL EVERY 6 HOURS PRN
Status: DISCONTINUED | OUTPATIENT
Start: 2023-01-30 | End: 2023-01-31 | Stop reason: HOSPADM

## 2023-01-30 RX ORDER — PROCHLORPERAZINE 25 MG
25 SUPPOSITORY, RECTAL RECTAL EVERY 12 HOURS PRN
Status: DISCONTINUED | OUTPATIENT
Start: 2023-01-30 | End: 2023-01-31 | Stop reason: HOSPADM

## 2023-01-30 RX ORDER — POLYETHYLENE GLYCOL 3350 17 G/17G
17 POWDER, FOR SOLUTION ORAL DAILY
Status: DISCONTINUED | OUTPATIENT
Start: 2023-01-31 | End: 2023-01-31 | Stop reason: HOSPADM

## 2023-01-30 RX ORDER — CHOLESTYRAMINE 4 G/9G
1 POWDER, FOR SUSPENSION ORAL
Qty: 270 PACKET | Refills: 3 | Status: SHIPPED | OUTPATIENT
Start: 2023-01-30 | End: 2023-04-03

## 2023-01-30 RX ORDER — ONDANSETRON 2 MG/ML
4 INJECTION INTRAMUSCULAR; INTRAVENOUS EVERY 6 HOURS PRN
Status: DISCONTINUED | OUTPATIENT
Start: 2023-01-30 | End: 2023-01-31 | Stop reason: HOSPADM

## 2023-01-30 RX ORDER — OMEPRAZOLE 40 MG/1
40 CAPSULE, DELAYED RELEASE ORAL DAILY
Qty: 90 CAPSULE | Refills: 3 | Status: SHIPPED | OUTPATIENT
Start: 2023-01-30 | End: 2023-11-07

## 2023-01-30 RX ORDER — KETOROLAC TROMETHAMINE 15 MG/ML
15 INJECTION, SOLUTION INTRAMUSCULAR; INTRAVENOUS EVERY 6 HOURS PRN
Status: DISCONTINUED | OUTPATIENT
Start: 2023-01-30 | End: 2023-01-31 | Stop reason: HOSPADM

## 2023-01-30 RX ORDER — ONDANSETRON 4 MG/1
4 TABLET, ORALLY DISINTEGRATING ORAL ONCE
Status: COMPLETED | OUTPATIENT
Start: 2023-01-30 | End: 2023-01-30

## 2023-01-30 RX ORDER — SODIUM CHLORIDE 9 MG/ML
INJECTION, SOLUTION INTRAVENOUS CONTINUOUS
Status: DISCONTINUED | OUTPATIENT
Start: 2023-01-31 | End: 2023-01-31 | Stop reason: HOSPADM

## 2023-01-30 RX ORDER — PROCHLORPERAZINE MALEATE 10 MG
10 TABLET ORAL EVERY 6 HOURS PRN
Status: DISCONTINUED | OUTPATIENT
Start: 2023-01-30 | End: 2023-01-31 | Stop reason: HOSPADM

## 2023-01-30 RX ADMIN — ACETAMINOPHEN 650 MG: 325 TABLET ORAL at 21:35

## 2023-01-30 RX ADMIN — ONDANSETRON 4 MG: 4 TABLET, ORALLY DISINTEGRATING ORAL at 21:35

## 2023-01-30 ASSESSMENT — ENCOUNTER SYMPTOMS
FEVER: 0
VOMITING: 0
NAUSEA: 1
ABDOMINAL PAIN: 1

## 2023-01-30 ASSESSMENT — PAIN SCALES - GENERAL: PAINLEVEL: NO PAIN (0)

## 2023-01-30 ASSESSMENT — ACTIVITIES OF DAILY LIVING (ADL)
ADLS_ACUITY_SCORE: 40
ADLS_ACUITY_SCORE: 40
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
## 2023-01-30 NOTE — PATIENT INSTRUCTIONS
It was a pleasure taking care of you today.  I've included a brief summary of our discussion and care plan from today's visit below.  Please review this information with your primary care provider.  _______________________________________________________________________    My recommendations are summarized as follows:    Please begin taking omeprazole 40mg once daily 30-60 minutes before breakfast.   Please take cholestyramine 4g (one packet) three times a day with meals  Follow up in 2 months to reassess symptoms  You may benefit from esophageal dilation in the future.     GERD Lifestyle Modifications:   If taking acid suppression therapy (PPI) it should be taken 30 - 60 minutes prior to meals on an empty stomach to have maximum effect  Avoid triggers for reflux such as coffee, chocolate, carbonated beverages, spicy foods, acidic foods (tomato based/citrus and foods with high fat content   Abstinence from alcohol and cessation of all tobacco products is recommended   Studies have shown that weight loss, exercise and maintaining a healthy BMI significantly reduce GERD symptoms   Remain upright while eating and immediately after meals  Do not eat or drink at least 3 hours prior to laying down supine/laying down for bed   Avoid late night/middle of the night snacking    Consider obtaining a wedge pillow or elevating the head end of the bed while sleeping   Avoid sleeping right side down as this can place the lower part of the esophagus/lower esophageal sphincter in a dependent position that favors reflux   Attempting to eat smaller more frequent meals may improve symptoms       To schedule endoscopic procedures you may call: [phone]  To schedule radiology tests you may call: [phone]  To schedule an ENT appointment you may call: [phone]    Please call my nurse Arianna ([phone]), Roberta ([phone]) with any questions or concerns.  If you were seen through the [facility] please feel free to  reach out to Helga at [phone]   --    Return to GI Clinic in 2 months to review your progress.    _______________________________________________________________________    Who do I call with any questions after my visit?  Please be in touch if there are any further questions that arise following today's visit.  There are multiple ways to contact your gastroenterology care team.      During business hours, you may reach a Gastroenterology nurse at [phone] and choose option 3.       To schedule or reschedule an appointment, please call [phone].     You can always send a secure message through [employer].  [employer] messages are answered by your nurse or doctor typically within 24 hours.  Please allow extra time on weekends and holidays.      For urgent/emergent questions after business hours, you may reach the on-call GI Fellow by contacting the [facility]  at [phone].     How will I get the results of any tests ordered?    You will receive all of your results.  If you have signed up for [employer], any tests ordered at your visit will be available to you after your physician reviews them.  Typically this takes 1-2 weeks.  If there are urgent results that require a change in your care plan, your physician or nurse will call you to discuss the next steps.      What is [employer]?  [employer] is a secure way for you to access all of your healthcare records from the [facility].  It is a web based computer program, so you can sign on to it from any location.  It also allows you to send secure messages to your care team.  I recommend signing up for [employer] access if you have not already done so and are comfortable with using a computer.      How to I schedule a follow-up visit?  If you did not schedule a follow-up visit today, please call [phone] to schedule a follow-up office visit.      If you feel you received exceptional care and are interested in supporting the clinical and  research goals of Dr. Ulloa or the Division of Gastroenterology, Hepatology, and Nutrition please contact [email or web address] from the [facility] to discuss opportunities to donate.    Sincerely,    Felipe Ulloa DO   of Medicine  Director, Esophageal Disorders Program  Division of Gastroenterology, Hepatology, and Nutrition  [facility]

## 2023-01-31 ENCOUNTER — APPOINTMENT (OUTPATIENT)
Dept: RADIOLOGY | Facility: CLINIC | Age: 33
End: 2023-01-31
Attending: PHYSICIAN ASSISTANT
Payer: COMMERCIAL

## 2023-01-31 VITALS
TEMPERATURE: 97.8 F | DIASTOLIC BLOOD PRESSURE: 59 MMHG | OXYGEN SATURATION: 96 % | WEIGHT: 293 LBS | HEART RATE: 96 BPM | SYSTOLIC BLOOD PRESSURE: 114 MMHG | BODY MASS INDEX: 56.7 KG/M2 | RESPIRATION RATE: 18 BRPM

## 2023-01-31 LAB
BASOPHILS # BLD AUTO: 0.1 10E3/UL (ref 0–0.2)
BASOPHILS NFR BLD AUTO: 1 %
EOSINOPHIL # BLD AUTO: 0.2 10E3/UL (ref 0–0.7)
EOSINOPHIL NFR BLD AUTO: 3 %
ERYTHROCYTE [DISTWIDTH] IN BLOOD BY AUTOMATED COUNT: 13.9 % (ref 10–15)
HCT VFR BLD AUTO: 41.5 % (ref 35–47)
HGB BLD-MCNC: 13 G/DL (ref 11.7–15.7)
HOLD SPECIMEN: NORMAL
IMM GRANULOCYTES # BLD: 0.1 10E3/UL
IMM GRANULOCYTES NFR BLD: 1 %
LYMPHOCYTES # BLD AUTO: 1.7 10E3/UL (ref 0.8–5.3)
LYMPHOCYTES NFR BLD AUTO: 29 %
MCH RBC QN AUTO: 28.7 PG (ref 26.5–33)
MCHC RBC AUTO-ENTMCNC: 31.3 G/DL (ref 31.5–36.5)
MCV RBC AUTO: 92 FL (ref 78–100)
MONOCYTES # BLD AUTO: 0.5 10E3/UL (ref 0–1.3)
MONOCYTES NFR BLD AUTO: 9 %
NEUTROPHILS # BLD AUTO: 3.4 10E3/UL (ref 1.6–8.3)
NEUTROPHILS NFR BLD AUTO: 57 %
NRBC # BLD AUTO: 0 10E3/UL
NRBC BLD AUTO-RTO: 0 /100
PLATELET # BLD AUTO: 229 10E3/UL (ref 150–450)
RBC # BLD AUTO: 4.53 10E6/UL (ref 3.8–5.2)
SARS-COV-2 RNA RESP QL NAA+PROBE: NEGATIVE
WBC # BLD AUTO: 5.9 10E3/UL (ref 4–11)

## 2023-01-31 PROCEDURE — 74018 RADEX ABDOMEN 1 VIEW: CPT

## 2023-01-31 PROCEDURE — 258N000003 HC RX IP 258 OP 636: Performed by: HOSPITALIST

## 2023-01-31 PROCEDURE — U0005 INFEC AGEN DETEC AMPLI PROBE: HCPCS | Performed by: STUDENT IN AN ORGANIZED HEALTH CARE EDUCATION/TRAINING PROGRAM

## 2023-01-31 PROCEDURE — 99221 1ST HOSP IP/OBS SF/LOW 40: CPT | Mod: 25 | Performed by: PHYSICIAN ASSISTANT

## 2023-01-31 PROCEDURE — 36415 COLL VENOUS BLD VENIPUNCTURE: CPT | Performed by: HOSPITALIST

## 2023-01-31 PROCEDURE — 96374 THER/PROPH/DIAG INJ IV PUSH: CPT

## 2023-01-31 PROCEDURE — 96361 HYDRATE IV INFUSION ADD-ON: CPT

## 2023-01-31 PROCEDURE — 250N000011 HC RX IP 250 OP 636: Performed by: HOSPITALIST

## 2023-01-31 PROCEDURE — G0378 HOSPITAL OBSERVATION PER HR: HCPCS

## 2023-01-31 PROCEDURE — 96376 TX/PRO/DX INJ SAME DRUG ADON: CPT

## 2023-01-31 PROCEDURE — 85004 AUTOMATED DIFF WBC COUNT: CPT | Performed by: HOSPITALIST

## 2023-01-31 PROCEDURE — 250N000013 HC RX MED GY IP 250 OP 250 PS 637: Performed by: HOSPITALIST

## 2023-01-31 PROCEDURE — 46601 DIAGNOSTIC ANOSCOPY: CPT | Performed by: PHYSICIAN ASSISTANT

## 2023-01-31 PROCEDURE — 250N000013 HC RX MED GY IP 250 OP 250 PS 637: Performed by: PHYSICIAN ASSISTANT

## 2023-01-31 PROCEDURE — 96375 TX/PRO/DX INJ NEW DRUG ADDON: CPT

## 2023-01-31 PROCEDURE — 250N000011 HC RX IP 250 OP 636: Performed by: PHYSICIAN ASSISTANT

## 2023-01-31 PROCEDURE — 99239 HOSP IP/OBS DSCHRG MGMT >30: CPT | Performed by: STUDENT IN AN ORGANIZED HEALTH CARE EDUCATION/TRAINING PROGRAM

## 2023-01-31 PROCEDURE — 250N000013 HC RX MED GY IP 250 OP 250 PS 637: Performed by: STUDENT IN AN ORGANIZED HEALTH CARE EDUCATION/TRAINING PROGRAM

## 2023-01-31 RX ORDER — OMEPRAZOLE 40 MG/1
40 CAPSULE, DELAYED RELEASE ORAL DAILY
Qty: 30 CAPSULE | Refills: 0 | Status: SHIPPED | OUTPATIENT
Start: 2023-01-31 | End: 2023-02-10

## 2023-01-31 RX ORDER — BISACODYL 10 MG
10 SUPPOSITORY, RECTAL RECTAL ONCE
Status: COMPLETED | OUTPATIENT
Start: 2023-01-31 | End: 2023-01-31

## 2023-01-31 RX ORDER — HYDROMORPHONE HYDROCHLORIDE 1 MG/ML
0.3 INJECTION, SOLUTION INTRAMUSCULAR; INTRAVENOUS; SUBCUTANEOUS ONCE
Status: COMPLETED | OUTPATIENT
Start: 2023-01-31 | End: 2023-01-31

## 2023-01-31 RX ORDER — CHOLESTYRAMINE 4 G/9G
1 POWDER, FOR SUSPENSION ORAL
Qty: 90 PACKET | Refills: 0 | Status: SHIPPED | OUTPATIENT
Start: 2023-01-31 | End: 2023-02-10

## 2023-01-31 RX ORDER — HYDROMORPHONE HCL IN WATER/PF 6 MG/30 ML
0.2 PATIENT CONTROLLED ANALGESIA SYRINGE INTRAVENOUS ONCE
Status: COMPLETED | OUTPATIENT
Start: 2023-01-31 | End: 2023-01-31

## 2023-01-31 RX ADMIN — POLYETHYLENE GLYCOL 3350 17 G: 17 POWDER, FOR SOLUTION ORAL at 07:59

## 2023-01-31 RX ADMIN — ACETAMINOPHEN 975 MG: 325 TABLET ORAL at 09:00

## 2023-01-31 RX ADMIN — KETOROLAC TROMETHAMINE 15 MG: 15 INJECTION, SOLUTION INTRAMUSCULAR; INTRAVENOUS at 00:09

## 2023-01-31 RX ADMIN — BISACODYL 10 MG: 10 SUPPOSITORY RECTAL at 10:57

## 2023-01-31 RX ADMIN — HYDROMORPHONE HYDROCHLORIDE 0.2 MG: 0.2 INJECTION, SOLUTION INTRAMUSCULAR; INTRAVENOUS; SUBCUTANEOUS at 09:55

## 2023-01-31 RX ADMIN — HYDROMORPHONE HYDROCHLORIDE 0.3 MG: 1 INJECTION, SOLUTION INTRAMUSCULAR; INTRAVENOUS; SUBCUTANEOUS at 11:34

## 2023-01-31 RX ADMIN — SODIUM CHLORIDE: 9 INJECTION, SOLUTION INTRAVENOUS at 00:08

## 2023-01-31 RX ADMIN — KETOROLAC TROMETHAMINE 15 MG: 15 INJECTION, SOLUTION INTRAMUSCULAR; INTRAVENOUS at 07:59

## 2023-01-31 RX ADMIN — ONDANSETRON 4 MG: 4 TABLET, ORALLY DISINTEGRATING ORAL at 06:26

## 2023-01-31 ASSESSMENT — ACTIVITIES OF DAILY LIVING (ADL)
ADLS_ACUITY_SCORE: 44
ADLS_ACUITY_SCORE: 40
ADLS_ACUITY_SCORE: 44

## 2023-01-31 NOTE — PHARMACY-ADMISSION MEDICATION HISTORY
Pharmacy Note - Admission Medication History    Pertinent Provider Information:      ______________________________________________________________________    Prior To Admission (PTA) med list completed and updated in EMR.       PTA Med List   Medication Sig Last Dose     albuterol (PROAIR HFA/PROVENTIL HFA/VENTOLIN HFA) 108 (90 Base) MCG/ACT inhaler Inhale 2 puffs into the lungs every 6 hours as needed for shortness of breath / dyspnea or wheezing Unknown     albuterol (PROVENTIL) (2.5 MG/3ML) 0.083% neb solution Take 2.5 mg by nebulization every 6 hours as needed for shortness of breath / dyspnea or wheezing Unknown     alum & mag hydroxide-simethicone (MAALOX MAX) 400-400-40 MG/5ML SUSP suspension Take 15 mLs by mouth every 6 hours as needed for heartburn or other (sore throat) Unknown     BANOPHEN 2-0.1 % external cream Apply 1 applicator topically 2 times daily as needed for itching Unknown     brexpiprazole (REXULTI) 2 MG tablet Take 2 mg by mouth every evening 1/30/2023     busPIRone (BUSPAR) 10 MG tablet Take 2 tablets (20 mg) by mouth 3 times daily (Patient taking differently: Take 20 mg by mouth 2 times daily) 1/30/2023     cetirizine (ZYRTEC) 10 MG tablet Take 1 tablet (10 mg) by mouth daily (Patient taking differently: Take 10 mg by mouth every evening) 1/30/2023     Cholecalciferol (D3 HIGH POTENCY) 25 MCG (1000 UT) CAPS Take 50 mcg by mouth daily 1/30/2023     cholestyramine (QUESTRAN) 4 g packet Take 1 packet (4 g) by mouth 3 times daily (with meals) Unknown at NEW Rx     desvenlafaxine (PRISTIQ) 100 MG 24 hr tablet Take 1 tablet (100 mg) by mouth daily 1/30/2023     ferrous sulfate (FEROSUL) 325 (65 Fe) MG tablet Take 1 tablet (325 mg) by mouth daily (with breakfast) 1/30/2023     fluocinonide (LIDEX) 0.05 % external cream Apply 1 Application topically See Admin Instructions Apply thinly to knee 2 times daily, Monday through Fridays, off on weekends as needed. Avoid face and skin folds. Unknown      furosemide (LASIX) 20 MG tablet Take 20 mg by mouth daily 1/30/2023     hydroxychloroquine (PLAQUENIL) 200 MG tablet Take 1 tablet (200 mg) by mouth 2 times daily (Patient taking differently: Take 400 mg by mouth daily) 1/30/2023     ibuprofen (ADVIL/MOTRIN) 600 MG tablet Take 1 tablet (600 mg) by mouth every 8 hours as needed for moderate pain Unknown     meclizine (ANTIVERT) 25 MG tablet Take 1 tablet (25 mg) by mouth every 6 hours as needed for dizziness Unknown     medroxyPROGESTERone (PROVERA) 10 MG tablet Take 1 tablet (10 mg) by mouth daily 1/30/2023     metFORMIN (GLUCOPHAGE XR) 500 MG 24 hr tablet Take 1,000 mg by mouth daily (with dinner) Take at 5 pm. 1/30/2023     methocarbamol (ROBAXIN) 500 MG tablet Take 1 tablet (500 mg) by mouth 4 times daily as needed Unknown     montelukast (SINGULAIR) 10 MG tablet Take 10 mg by mouth every evening 1/30/2023     OLANZapine (ZYPREXA) 2.5 MG tablet Take 1.25 mg by mouth daily (with dinner) At 5pm 1/30/2023     omeprazole (PRILOSEC) 40 MG DR capsule Take 1 capsule (40 mg) by mouth daily 1/30/2023 at New Rx     ondansetron (ZOFRAN-ODT) 4 MG ODT tab Take 1 tablet (4 mg) by mouth every 8 hours as needed for nausea Unknown     pregabalin (LYRICA) 100 MG capsule Take 1 capsule (100 mg) by mouth 3 times daily 1/30/2023     Respiratory Therapy Supplies (NEBULIZER) BRENDAN Nebulizer device.  Albuterol nebulization every 2 hours as needed for shortness of breath or wheezing.      Semaglutide 3 MG TABS Take 3 mg by mouth daily before breakfast Then 7mg daily for second month. Then 14 mg daily 1/30/2023 at current dose 3mg     SUMAtriptan (IMITREX) 25 MG tablet Take 25 mg by mouth at onset of headache for migraine May repeat in 2 hours. Unknown     valACYclovir (VALTREX) 1000 mg tablet Take 2 tablets by mouth two times daily for one day. Use as needed at onset of cold sore.  Unknown       Information source(s): Patient  Method of interview communication: in-person    Summary of  "Changes to PTA Med List  New: Rubelsus taper up dose, Prilosec and Questran are both new and hasn't started taking.   Discontinued: Tramadol (Rx fill history 1/24/23_but per pt \"I don't take it\")   Changed: Buspar to BID     Patient was asked about OTC/herbal products specifically.  PTA med list reflects this.    In the past week, patient estimated taking medication this percent of the time:  greater than 90%.    Medication Affordability:       Allergies were reviewed, assessed, and updated with the patient.      Patient did not bring any medications to the hospital and can't retrieve from home. No multi-dose medications are available for use during hospital stay.     The information provided in this note is only as accurate as the sources available at the time of the update(s).    Thank you for the opportunity to participate in the care of this patient.    Olga Lei, PharmCHIN  1/31/2023 9:35 AM  "

## 2023-01-31 NOTE — CONSULTS
Care Management Discharge Note    Discharge Date: 01/31/2023       Discharge Disposition:  Group Home    Discharge Services:  n/a    Discharge DME:  n/a    Discharge Transportation:  [facility]    Private pay costs discussed: Writer spoke with patient's guardian Janie Martell via phone ([phone]) Explained MOON/Observation Status. Verified that on discharge patient will be going via medical transport and they are aware that there may be out of pocket costs.    PAS Confirmation Code:  N/a    Education Provided on the Discharge Plan:  Yes  Persons Notified of Discharge Plans: Patient, guardian and Tori Caregiver at Group Home ([phone])    Patient/Family in Agreement with the Plan:  Guardian agrees with plan for patient to return to group home when ready to be discharge.    Additional Information:  Spoke with Janie Martell ([phone]) who stated patient should be transported back to facility via medical transportation. Need to contact facility at ([phone]) and make them aware that patient is on their way back.        CRISTAL FRIEDMAN, RN/CM

## 2023-01-31 NOTE — ED NOTES
Pt states that the object is the handle that connects to a razor head .The clip end is inserted first into rectum    ANNELIESE LUNA RN

## 2023-01-31 NOTE — ED NOTES
Bed: [facility]10  Expected date: 1/30/23  Expected time: 8:21 PM  Means of arrival: Ambulance  Comments:  [facility]

## 2023-01-31 NOTE — ED PROVIDER NOTES
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 external cream, Apply 1 applicator topically 2 times daily as needed for itching, Disp: , Rfl:      brexpiprazole (REXULTI) 2 MG tablet, Take 2 mg by mouth every evening, Disp: , Rfl:      busPIRone (BUSPAR) 10 MG tablet, Take 2 tablets  (20 mg) by mouth 3 times daily, Disp: 180 tablet, Rfl: 0     cetirizine (ZYRTEC) 10 MG tablet, Take 1 tablet (10 mg) by mouth daily (Patient taking differently: Take 10 mg by mouth every evening), Disp: 30 tablet, Rfl: 0     Cholecalciferol (D3 HIGH POTENCY) 25 MCG (1000 UT) CAPS, Take 50 mcg by mouth daily, Disp: , Rfl:      cholestyramine (QUESTRAN) 4 g packet, Take 1 packet (4 g) by mouth 3 times daily (with meals), Disp: 270 packet, Rfl: 3     desvenlafaxine (PRISTIQ) 100 MG 24 hr tablet, Take 1 tablet (100 mg) by mouth daily, Disp: 30 tablet, Rfl: 0     ferrous sulfate (FEROSUL) 325 (65 Fe) MG tablet, Take 1 tablet (325 mg) by mouth daily (with breakfast), Disp: 30 tablet, Rfl: 0     fluocinonide (LIDEX) 0.05 % external cream, Apply 1 Application topically See Admin Instructions Apply thinly to knee 2 times daily, Monday through Fridays, off on weekends as needed. Avoid face and skin folds., Disp: , Rfl:      furosemide (LASIX) 20 MG tablet, Take 20 mg by mouth daily, Disp: , Rfl:      hydroxychloroquine (PLAQUENIL) 200 MG tablet, Take 1 tablet (200 mg) by mouth 2 times daily (Patient taking differently: Take 400 mg by mouth daily), Disp: 30 tablet, Rfl: 0     ibuprofen (ADVIL/MOTRIN) 600 MG tablet, Take 1 tablet (600 mg) by mouth every 8 hours as needed for moderate pain, Disp: 24 tablet, Rfl: 0     meclizine (ANTIVERT) 25 MG tablet, Take 1 tablet (25 mg) by mouth every 6 hours as needed for dizziness, Disp: 30 tablet, Rfl: 0     medroxyPROGESTERone (PROVERA) 10 MG tablet, Take 1 tablet (10 mg) by mouth daily, Disp: 10 tablet, Rfl: 0     metFORMIN (GLUCOPHAGE XR) 500 MG 24 hr tablet, Take 1,000 mg by mouth daily (with dinner) Take at 5 pm., Disp: , Rfl:      methocarbamol (ROBAXIN) 500 MG tablet, Take 1 tablet (500 mg) by mouth 4 times daily as needed, Disp: 30 tablet, Rfl: 0     montelukast (SINGULAIR) 10 MG tablet, Take 10 mg by mouth every evening, Disp: , Rfl:      OLANZapine (ZYPREXA) 2.5 MG tablet, Take  1.25 mg by mouth daily (with dinner) At 5pm, Disp: , Rfl:      omeprazole (PRILOSEC) 40 MG DR capsule, Take 1 capsule (40 mg) by mouth daily, Disp: 90 capsule, Rfl: 3     ondansetron (ZOFRAN-ODT) 4 MG ODT tab, Take 1 tablet (4 mg) by mouth every 8 hours as needed for nausea, Disp: 15 tablet, Rfl: 0     pregabalin (LYRICA) 100 MG capsule, Take 1 capsule (100 mg) by mouth 3 times daily, Disp: 90 capsule, Rfl: 0     Respiratory Therapy Supplies (NEBULIZER) BRENDAN, Nebulizer device.  Albuterol nebulization every 2 hours as needed for shortness of breath or wheezing., Disp: 1 each, Rfl: 0     SUMAtriptan (IMITREX) 25 MG tablet, Take 25 mg by mouth at onset of headache for migraine May repeat in 2 hours., Disp: , Rfl:      valACYclovir (VALTREX) 1000 mg tablet, Take 2 tablets by mouth two times daily for one day. Use as needed at onset of cold sore. , Disp: , Rfl:     ALLERGIES:  Allergies   Allergen Reactions     Amoxicillin-Pot Clavulanate Other (See Comments), Swelling and Rash     PN: facial swelling, left side. Also had cortisone injection the same day as she started the Augmentin.  Noted in downtime recovery of chart.    PN: facial swelling, left side. Also had cortisone injection the same day as she started the Augmentin.; HUT Comment: PN: facial swelling, left side. Also had cortisone injection the same day as she started the Augmentin.Noted in downtime recovery of chart.; HUT Reaction: Rash; HUT Reaction: Unknown; HUT Reaction Type: Allergy; HUT Severity: Med; HUT Noted: [id number]     Hydrocodone-Acetaminophen Nausea and Vomiting and Rash     Update on 12/12  Pt says she can take tylenol just not the hydrocodone.   Other reaction(s): Rash       Latex Rash     HUT Reaction: Rash; HUT Reaction Type: Allergy; HUT Severity: Low; HUT Noted: [id number]  Other reaction(s): Rash       Oseltamivir Hives     med stopped, PN: med stopped  med stopped, PN: med stopped; HUT Comment: med stopped, PN: med stopped; HUT Reaction:  Hives; HUT Reaction Type: Allergy; HUT Severity: Med; HUT Noted: [id number]     Penicillins Anaphylaxis     HUT Reaction: Anaphylaxis; HUT Reaction Type: Allergy; HUT Severity: High; HUT Noted: [id number]     Vancomycin Itching, Swelling and Rash     Other reaction(s): Redness  Flushed face, very itchy; HUT Comment: Flushed face, very itchy; HUT Reaction: Angioedema; HUT Reaction: Redness; HUT Severity: Med; HUT Noted: [id number]    facial     Hydrocodone Nausea and Vomiting and GI Disturbance     vomiting for days, PN: vomiting for days; HUT Comment: vomiting for days; HUT Reaction: Gastrointestinal; HUT Reaction: Nausea And Vomiting; HUT Reaction Type: Intolerance; HUT Severity: Med; HUT Noted: [id number]  vomiting for days       Blood-Group Specific Substance Other (See Comments)     Patient has an anti-Cw and non-specific antibodies. Blood product orders may be delayed. Draw one red top and two purple top tubes for all type/screen/crossmatch orders.  Patient has anti-Cw, anti-K (Angella), Warm auto and nonspecific antibodies. Blood products may be delayed. Draw patient 24 hours prior to transfusion. Draw one red top and two purple top tubes for all type and screen orders.     Clavulanic Acid Angioedema     Fentanyl Itching     Naltrexone Other (See Comments)     Reaction(s): Vivid dreams.     Other Drug Allergy (See Comments)      See original file MRN [id number]. Files are marked for merge     Oxycodone Swelling     Adhesive Tape Rash     Silicone type     Band-Aid Anti-Itch      Other reaction(s): adhesive allergy     Cephalosporins Rash     Lamotrigine Rash     Possibly associated with Lamictal.   HUT Comment: Possibly associated with Lamictal. ; HUT Reaction: Rash; HUT Reaction Type: Allergy; HUT Severity: Low; HUT Noted: [id number]       FAMILY HISTORY:  Family History   Problem Relation Age of Onset     Diabetes Type 2  Maternal Grandmother      Diabetes Type 2  Paternal Grandmother      Breast Cancer Paternal  Grandmother      Cerebrovascular Disease Father 53     Myocardial Infarction No family hx of      Coronary Artery Disease Early Onset No family hx of      Ovarian Cancer No family hx of      Colon Cancer No family hx of      Depression Mother      Anxiety Disorder Mother        SOCIAL HISTORY:   Social History     Socioeconomic History     Marital status: Single   Occupational History     Occupation: On disability   Tobacco Use     Smoking status: Never     Smokeless tobacco: Never   Substance and Sexual Activity     Alcohol use: No     Alcohol/week: 0.0 standard drinks     Drug use: No     Sexual activity: Not Currently     Partners: Male     Birth control/protection: I.U.D.     Comment: IUD - Mirena - placed July, 2015   Social History Narrative    Single.    Living in independent living portion of [facility].    On disability.    No regular exercise.        PHYSICAL EXAM:    Vitals: BP (!) 133/91   Pulse 108   Resp 20   Wt 140.6 kg (310 lb)   LMP 12/01/2022   SpO2 99%   BMI 56.70 kg/m     Constitutional: Well developed, well nourished. No acute distress  HENT: Normocephalic, atraumatic, mucous membranes moist, nose normal  Eyes: Pupils mid range, sclera white, no discharge  Neck- Gross ROM intact.   Respiratory: Normal work of breathing, normal rate, speaks in full sentences  Cardiovascular: Normal heart rate  Abdomen: soft, not distended, lower abdominal tenderness without guarding  Musculoskeletal: Moving all 4 extremities intentionally and without pain.  Neurologic: Alert & oriented, cranial nerves grossly intact. Speech clear. Moving all extremities spontaneously.  Psychiatric: Affect normal, cooperative.       LAB:  All pertinent labs reviewed and interpreted.  Labs Ordered and Resulted from Time of ED Arrival to Time of ED Departure - No data to display    RADIOLOGY:  XR Abdomen Port 1 View   Final Result   IMPRESSION: 4.4 mm radiopaque metallic foreign body projects over the sacrum, may  reflect rectal foreign body.          PROCEDURES:   Procedures   PROCEDURE: Foreign body removal   INDICATIONS: Rectal foreign body   PROCEDURE PROVIDER: Dr Agatah Atkinson   CONSENT: Risks, benefits and alternatives were discussed with and Verbal consent was obtained from patient and legal guardian.   MEDICATIONS: N/A   DETAILS: Female RN present in the room. Patient placed on her side Unable to palpate foreign body with patient bearing down despite several attempts   COMPLICATIONS: Patient tolerated procedure well, without complication         I, Nevin Steiner, am serving as a scribe to document services personally performed by Dr. Agatha Atkinson based on my observation and the provider's statements to me. I, Agatha Atkinson MD attest that Nevin Steiner is acting in a scribe capacity, has observed my performance of the services and has documented them in accordance with my direction.    Agatha Atkinson M.D.  Emergency Medicine  [facility] EMERGENCY ROOM  [address] [address]  [phone]  Dept: [phone]     Agatha Atkinson MD  01/30/23 4539

## 2023-01-31 NOTE — PROGRESS NOTES
Pt agreeable to discharge. Transported back to group home via stretcher transport. AVS given. [facility] updated [phone].

## 2023-01-31 NOTE — H&P
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 Comment: vomiting for days; HUT Reaction: Gastrointestinal; HUT Reaction: Nausea And Vomiting; HUT Reaction Type: Intolerance; HUT Severity: Med; HUT Noted: [id number]  vomiting for days       Blood-Group Specific Substance Other (See Comments)     Patient has an anti-Cw and non-specific  antibodies. Blood product orders may be delayed. Draw one red top and two purple top tubes for all type/screen/crossmatch orders.  Patient has anti-Cw, anti-K (Angella), Warm auto and nonspecific antibodies. Blood products may be delayed. Draw patient 24 hours prior to transfusion. Draw one red top and two purple top tubes for all type and screen orders.     Clavulanic Acid Angioedema     Fentanyl Itching     Naltrexone Other (See Comments)     Reaction(s): Vivid dreams.     Other Drug Allergy (See Comments)      See original file MRN [id number]. Files are marked for merge     Oxycodone Swelling     Adhesive Tape Rash     Silicone type     Band-Aid Anti-Itch      Other reaction(s): adhesive allergy     Cephalosporins Rash     Lamotrigine Rash     Possibly associated with Lamictal.   HUT Comment: Possibly associated with Lamictal. ; HUT Reaction: Rash; HUT Reaction Type: Allergy; HUT Severity: Low; HUT Noted: [id number]     Prior to Admission Medications      Prior to Admission Medications   Prescriptions Last Dose Informant Patient Reported? Taking?   BANOPHEN 2-0.1 % external cream   Yes No   Sig: Apply 1 applicator topically 2 times daily as needed for itching   Cholecalciferol (D3 HIGH POTENCY) 25 MCG (1000 UT) CAPS   Yes No   Sig: Take 50 mcg by mouth daily   OLANZapine (ZYPREXA) 2.5 MG tablet   Yes No   Sig: Take 1.25 mg by mouth daily (with dinner) At 5pm   Respiratory Therapy Supplies (NEBULIZER) BRENDAN  Self No No   Sig: Nebulizer device.  Albuterol nebulization every 2 hours as needed for shortness of breath or wheezing.   SUMAtriptan (IMITREX) 25 MG tablet  Self Yes No   Sig: Take 25 mg by mouth at onset of headache for migraine May repeat in 2 hours.   albuterol (PROAIR HFA/PROVENTIL HFA/VENTOLIN HFA) 108 (90 Base) MCG/ACT inhaler  Self No No   Sig: Inhale 2 puffs into the lungs every 6 hours as needed for shortness of breath / dyspnea or wheezing   albuterol (PROVENTIL) (2.5 MG/3ML) 0.083% neb solution   Yes No    Sig: Take 2.5 mg by nebulization every 6 hours as needed for shortness of breath / dyspnea or wheezing   alum & mag hydroxide-simethicone (MAALOX MAX) 400-400-40 MG/5ML SUSP suspension   No No   Sig: Take 15 mLs by mouth every 6 hours as needed for heartburn or other (sore throat)   brexpiprazole (REXULTI) 2 MG tablet   Yes No   Sig: Take 2 mg by mouth every evening   busPIRone (BUSPAR) 10 MG tablet  Self No No   Sig: Take 2 tablets (20 mg) by mouth 3 times daily   cetirizine (ZYRTEC) 10 MG tablet  Self No No   Sig: Take 1 tablet (10 mg) by mouth daily   Patient taking differently: Take 10 mg by mouth every evening   cholestyramine (QUESTRAN) 4 g packet   No No   Sig: Take 1 packet (4 g) by mouth 3 times daily (with meals)   desvenlafaxine (PRISTIQ) 100 MG 24 hr tablet  Self No No   Sig: Take 1 tablet (100 mg) by mouth daily   ferrous sulfate (FEROSUL) 325 (65 Fe) MG tablet  Self No No   Sig: Take 1 tablet (325 mg) by mouth daily (with breakfast)   fluocinonide (LIDEX) 0.05 % external cream   Yes No   Sig: Apply 1 Application topically See Admin Instructions Apply thinly to knee 2 times daily, Monday through Fridays, off on weekends as needed. Avoid face and skin folds.   furosemide (LASIX) 20 MG tablet   Yes No   Sig: Take 20 mg by mouth daily   hydroxychloroquine (PLAQUENIL) 200 MG tablet  Self No No   Sig: Take 1 tablet (200 mg) by mouth 2 times daily   Patient taking differently: Take 400 mg by mouth daily   ibuprofen (ADVIL/MOTRIN) 600 MG tablet   No No   Sig: Take 1 tablet (600 mg) by mouth every 8 hours as needed for moderate pain   meclizine (ANTIVERT) 25 MG tablet   No No   Sig: Take 1 tablet (25 mg) by mouth every 6 hours as needed for dizziness   medroxyPROGESTERone (PROVERA) 10 MG tablet   No No   Sig: Take 1 tablet (10 mg) by mouth daily   metFORMIN (GLUCOPHAGE XR) 500 MG 24 hr tablet   Yes No   Sig: Take 1,000 mg by mouth daily (with dinner) Take at 5 pm.   methocarbamol (ROBAXIN) 500 MG tablet   No  No   Sig: Take 1 tablet (500 mg) by mouth 4 times daily as needed   montelukast (SINGULAIR) 10 MG tablet  Self Yes No   Sig: Take 10 mg by mouth every evening   omeprazole (PRILOSEC) 40 MG DR capsule   No No   Sig: Take 1 capsule (40 mg) by mouth daily   ondansetron (ZOFRAN-ODT) 4 MG ODT tab  Self No No   Sig: Take 1 tablet (4 mg) by mouth every 8 hours as needed for nausea   pregabalin (LYRICA) 100 MG capsule  Self No No   Sig: Take 1 capsule (100 mg) by mouth 3 times daily   valACYclovir (VALTREX) 1000 mg tablet  Self Yes No   Sig: Take 2 tablets by mouth two times daily for one day. Use as needed at onset of cold sore.       Facility-Administered Medications: None      Review of Systems     A 12 point comprehensive review of systems was negative except as noted above in HPI.    PHYSICAL EXAMINATION     Vitals      Temp:  [98.2  F (36.8  C)] 98.2  F (36.8  C)  Pulse:  [] 98  Resp:  [20] 20  BP: (133-143)/(77-91) 143/77  SpO2:  [95 %-99 %] 95 %    Examination   Physical Exam:    Gen: no acute distress, comfortable  ENT: no scleral icterus  Pulm: lungs are clear without wheezing, crackles, breathing comfortably at rest  CV: regular rate and rhythm, no significant lower extremity pitting edema  GI: abdomen is nondistended  MSK: no obvious deformities of the extremities  Derm: Not pale, no jaundice  Psych: appropriate affect      Pertinent Radiology     Radiology Results:   Recent Results (from the past 24 hour(s))   XR Abdomen Port 1 View    Narrative    EXAM: XR ABDOMEN PORT 1 VIEW  LOCATION: [facility]  DATE/TIME: 1/30/2023 8:55 PM    INDICATION: rectal foreign body  COMPARISON: None.      Impression    IMPRESSION: 4.4 mm radiopaque metallic foreign body projects over the sacrum, may reflect rectal foreign body.       Brice Cabrales, DO  [facility]  [facility]   Phone: #[phone]

## 2023-01-31 NOTE — PROGRESS NOTES
Pt is alert and oriented and able to make all needs known.  Mood is quite labile.  Pt was up with SBA to the commode.  Call light was  within reach.  1:1 sitter at the beside.  Pt noted to have 8/10 pain this AM.  Oral tylenol, IV Toradol and Dilaudid given with some relief.  Pt stated a sharp pain to abdomen/rectum.  Surgery following and attempted to remove the object in the patient's rectum.  Suppository ordered and administered and shortly after the patient was able to have a bowel movement and evacuate the foreign object, thus, the patient's pain subsided and she tolerated an oral diet.  [facility] and Surgery cleared the patient for discharge.  Care Manager was able to see the patient to help facilitate discharge.  Pt to be transported via stretcher ride to group home.

## 2023-01-31 NOTE — DISCHARGE SUMMARY
[facility]  Hospitalist Discharge Summary      Date of Admission:  1/30/2023  Date of Discharge:  1/31/2023  Discharging Provider: Asher Linda MD  Discharge Service: Hospitalist Service    Discharge Diagnoses   Foreign object in the rectum    Follow-ups Needed After Discharge   Follow-up Appointments     Follow-up and recommended labs and tests       Follow up with primary care provider, Latonya Knight, within a week,   for hospital follow- up. Please ensure psychiatry follow-up (patient   declined seeing psychiatry while here)               Unresulted Labs Ordered in the Past 30 Days of this Admission     No orders found for last 31 day(s).      These results will be followed up by [facility]     Discharge Disposition   Discharged to home  Condition at discharge: Stable     Hospital Course       Nevin Alvarado is a 32 year old female who presented with complaints of rectal pain, lower abdominal pain after inserting a shaving razor without the blade into her rectum. Medical history is notable for many previous intentional ingestions, anxiety, borderline personality disorder, history of suicide attempts, history of PE, sleep apnea, obesity.    Initial evaluation revealed unremarkable vital signs, no labs drawn, abdominal x-ray did not identify any obvious free air, a 4.4 mm radiopaque metallic foreign body projected over the sacrum. Initial treatment included remote consultation with GI.  For her reflux, patient was recommended to begin omeprazole 40 mg once daily as well as cholestyramine 4 g 3 times daily and to follow-up in 2 months.    General surgery was consulted.  On hospital day 1, multiple attempts were made for bedside trial of manual removal of foreign object; initially was unsuccessful.  Follow-up plan was to do a possible flex sig versus further intervention; however, after a further attempt at manipulation along with medication, the patient was able to pass a bowel  movement and was able to evacuate the foreign object during the BM.  Surgery cleared for discharge.    Later in the afternoon, as the patient remained vitally and hemodynamically stable, she was discharged back home with recommendations for close follow-up with her primary care provider as well as with psychiatry.  The recommended GI medications were ordered.      Of note, the patient denied at least 4 times to her care team on 1/31 (and prior on admit) and with multiple providers that there was any attempt at suicidal ideation or attempt to harm self or others; she reported the situation was in the setting of recreational activity.  She also declined meeting with a psychiatrist (offered as we discussed the current context of her medical care); as such, she was recommended to follow-up as an outpatient with both PCP and psychiatry.     Consultations This Hospital Stay   GASTROENTEROLOGY IP CONSULT  SURGERY GENERAL IP CONSULT  CARE MANAGEMENT / SOCIAL WORK IP CONSULT    Code Status   Full Code    Time Spent on this Encounter   I, Asher Linda MD, personally saw the patient today and spent greater than 30 minutes discharging this patient.       Asher Linda MD  [facility] EMERGENCY ROOM  [address] [place] [id number]  Phone: [phone]  Fax: [phone]  ______________________________________________________________________    Physical Exam   Vital Signs: Temp: 97.8  F (36.6  C) Temp src: Oral BP: 114/59 Pulse: 96   Resp: 18 SpO2: 96 % O2 Device: None (Room air)    Weight: 310 lbs 0 oz    General: alert, oriented, and in no acute distress  Pulmonary: clear to auscultation bilaterally, normal respiratory effort, on room air, no rales or wheezes or evidence of accessory muscle use  CVS: regular rate and rhythm, no murmurs, rubs, or gallops; no blatant jugular venous distention; no extremity edema and extremities are warm to the touch  GI: soft, nontender, BS+, no  rebound or guarding, no conspicuous organomegaly   Neuro: nonfocal, moves all extremities of own volition  Psych: appropriate          Primary Care Physician   Latonya Knight    Discharge Orders      Reason for your hospital stay    Foreign object impaction; this was successfully evacuated while in the ER.     Follow-up and recommended labs and tests     Follow up with primary care provider, Latonya Knight, within a week, for hospital follow- up. Please ensure psychiatry follow-up (patient declined seeing psychiatry while here)     Activity    Your activity upon discharge: activity as tolerated     Diet    Follow this diet upon discharge: Orders Placed This Encounter      Regular Diet Adult       Significant Results and Procedures   Results for orders placed or performed during the hospital encounter of 01/30/23   XR Abdomen Port 1 View    Narrative    EXAM: XR ABDOMEN PORT 1 VIEW  LOCATION: [facility]  DATE/TIME: 1/30/2023 8:55 PM    INDICATION: rectal foreign body  COMPARISON: None.      Impression    IMPRESSION: 4.4 mm radiopaque metallic foreign body projects over the sacrum, may reflect rectal foreign body.   XR Abdomen Port 1 View    Narrative    EXAM: XR ABDOMEN PORT 1 VIEW  LOCATION: [facility]  DATE/TIME: 1/31/2023 8:39 AM    INDICATION: recheck location of foreign body  COMPARISON: 01/30/2023.      Impression    IMPRESSION: No bowel obstruction or free intraperitoneal air on this single view. Rectangular metallic foreign body measuring 5 mm is unchanged in position projecting over the right lower sacrum in the expected location of the rectum.     *Note: Due to a large number of results and/or encounters for the requested time period, some results have not been displayed. A complete set of results can be found in Results Review.       Discharge Medications   Current Discharge Medication List      START taking these medications    Details   !!  cholestyramine (QUESTRAN) 4 g packet Take 1 packet (4 g) by mouth 3 times daily (with meals)  Qty: 90 packet, Refills: 0    Associated Diagnoses: Gastroesophageal reflux disease, unspecified whether esophagitis present      !! omeprazole (PRILOSEC) 40 MG DR capsule Take 1 capsule (40 mg) by mouth daily  Qty: 30 capsule, Refills: 0    Associated Diagnoses: Gastroesophageal reflux disease, unspecified whether esophagitis present       !! - Potential duplicate medications found. Please discuss with provider.      CONTINUE these medications which have NOT CHANGED    Details   albuterol (PROAIR HFA/PROVENTIL HFA/VENTOLIN HFA) 108 (90 Base) MCG/ACT inhaler Inhale 2 puffs into the lungs every 6 hours as needed for shortness of breath / dyspnea or wheezing  Qty: 18 g, Refills: 0    Comments: Pharmacy may dispense brand covered by insurance (Proair, or proventil or ventolin or generic albuterol inhaler)      albuterol (PROVENTIL) (2.5 MG/3ML) 0.083% neb solution Take 2.5 mg by nebulization every 6 hours as needed for shortness of breath / dyspnea or wheezing      alum & mag hydroxide-simethicone (MAALOX MAX) 400-400-40 MG/5ML SUSP suspension Take 15 mLs by mouth every 6 hours as needed for heartburn or other (sore throat)  Qty: 355 mL, Refills: 0      BANOPHEN 2-0.1 % external cream Apply 1 applicator topically 2 times daily as needed for itching      brexpiprazole (REXULTI) 2 MG tablet Take 2 mg by mouth every evening      busPIRone (BUSPAR) 10 MG tablet Take 2 tablets (20 mg) by mouth 3 times daily  Qty: 180 tablet, Refills: 0    Associated Diagnoses: Anxiety disorder, unspecified type      cetirizine (ZYRTEC) 10 MG tablet Take 1 tablet (10 mg) by mouth daily  Qty: 30 tablet, Refills: 0    Associated Diagnoses: Pica in adults; Gastroesophageal reflux disease without esophagitis      Cholecalciferol (D3 HIGH POTENCY) 25 MCG (1000 UT) CAPS Take 50 mcg by mouth daily      !! cholestyramine (QUESTRAN) 4 g packet Take 1 packet  (4 g) by mouth 3 times daily (with meals)  Qty: 270 packet, Refills: 3    Associated Diagnoses: Diarrhea, unspecified type      desvenlafaxine (PRISTIQ) 100 MG 24 hr tablet Take 1 tablet (100 mg) by mouth daily  Qty: 30 tablet, Refills: 0    Associated Diagnoses: Major depressive disorder, recurrent episode, moderate (H)      ferrous sulfate (FEROSUL) 325 (65 Fe) MG tablet Take 1 tablet (325 mg) by mouth daily (with breakfast)  Qty: 30 tablet, Refills: 0    Associated Diagnoses: Red blood cell antibody positive      fluocinonide (LIDEX) 0.05 % external cream Apply 1 Application topically See Admin Instructions Apply thinly to knee 2 times daily, Monday through Fridays, off on weekends as needed. Avoid face and skin folds.      furosemide (LASIX) 20 MG tablet Take 20 mg by mouth daily      hydroxychloroquine (PLAQUENIL) 200 MG tablet Take 1 tablet (200 mg) by mouth 2 times daily  Qty: 30 tablet, Refills: 0    Associated Diagnoses: Other specified health status      ibuprofen (ADVIL/MOTRIN) 600 MG tablet Take 1 tablet (600 mg) by mouth every 8 hours as needed for moderate pain  Qty: 24 tablet, Refills: 0      meclizine (ANTIVERT) 25 MG tablet Take 1 tablet (25 mg) by mouth every 6 hours as needed for dizziness  Qty: 30 tablet, Refills: 0      medroxyPROGESTERone (PROVERA) 10 MG tablet Take 1 tablet (10 mg) by mouth daily  Qty: 10 tablet, Refills: 0      metFORMIN (GLUCOPHAGE XR) 500 MG 24 hr tablet Take 1,000 mg by mouth daily (with dinner) Take at 5 pm.      methocarbamol (ROBAXIN) 500 MG tablet Take 1 tablet (500 mg) by mouth 4 times daily as needed  Qty: 30 tablet, Refills: 0      montelukast (SINGULAIR) 10 MG tablet Take 10 mg by mouth every evening      OLANZapine (ZYPREXA) 2.5 MG tablet Take 1.25 mg by mouth daily (with dinner) At 5pm      !! omeprazole (PRILOSEC) 40 MG DR capsule Take 1 capsule (40 mg) by mouth daily  Qty: 90 capsule, Refills: 3    Associated Diagnoses: Gastroesophageal reflux disease,  unspecified whether esophagitis present      ondansetron (ZOFRAN-ODT) 4 MG ODT tab Take 1 tablet (4 mg) by mouth every 8 hours as needed for nausea  Qty: 15 tablet, Refills: 0    Associated Diagnoses: Gastroesophageal reflux disease without esophagitis      pregabalin (LYRICA) 100 MG capsule Take 1 capsule (100 mg) by mouth 3 times daily  Qty: 90 capsule, Refills: 0    Associated Diagnoses: Anxiety; Polyneuropathy      Respiratory Therapy Supplies (NEBULIZER) BRENDAN Nebulizer device.  Albuterol nebulization every 2 hours as needed for shortness of breath or wheezing.  Qty: 1 each, Refills: 0    Comments: Include tubing and mask for age please.      Semaglutide 3 MG TABS Take 3 mg by mouth daily before breakfast Then 7mg daily for second month. Then 14 mg daily      SUMAtriptan (IMITREX) 25 MG tablet Take 25 mg by mouth at onset of headache for migraine May repeat in 2 hours.      valACYclovir (VALTREX) 1000 mg tablet Take 2 tablets by mouth two times daily for one day. Use as needed at onset of cold sore.        !! - Potential duplicate medications found. Please discuss with provider.        Allergies   Allergies   Allergen Reactions     Amoxicillin-Pot Clavulanate Other (See Comments), Swelling and Rash     PN: facial swelling, left side. Also had cortisone injection the same day as she started the Augmentin.  Noted in downtime recovery of chart.    PN: facial swelling, left side. Also had cortisone injection the same day as she started the Augmentin.; HUT Comment: PN: facial swelling, left side. Also had cortisone injection the same day as she started the Augmentin.Noted in downtime recovery of chart.; HUT Reaction: Rash; HUT Reaction: Unknown; HUT Reaction Type: Allergy; HUT Severity: Med; HUT Noted: [id number]     Hydrocodone-Acetaminophen Nausea and Vomiting and Rash     Update on 12/12  Pt says she can take tylenol just not the hydrocodone.   Other reaction(s): Rash       Latex Rash     HUT Reaction: Rash; HUT  Reaction Type: Allergy; HUT Severity: Low; HUT Noted: [id number]  Other reaction(s): Rash       Oseltamivir Hives     med stopped, PN: med stopped  med stopped, PN: med stopped; HUT Comment: med stopped, PN: med stopped; HUT Reaction: Hives; HUT Reaction Type: Allergy; HUT Severity: Med; HUT Noted: [id number]     Penicillins Anaphylaxis     HUT Reaction: Anaphylaxis; HUT Reaction Type: Allergy; HUT Severity: High; HUT Noted: [id number]     Vancomycin Itching, Swelling and Rash     Other reaction(s): Redness  Flushed face, very itchy; HUT Comment: Flushed face, very itchy; HUT Reaction: Angioedema; HUT Reaction: Redness; HUT Severity: Med; HUT Noted: [id number]    facial     Hydrocodone Nausea and Vomiting and GI Disturbance     vomiting for days, PN: vomiting for days; HUT Comment: vomiting for days; HUT Reaction: Gastrointestinal; HUT Reaction: Nausea And Vomiting; HUT Reaction Type: Intolerance; HUT Severity: Med; HUT Noted: [id number]  vomiting for days       Blood-Group Specific Substance Other (See Comments)     Patient has an anti-Cw and non-specific antibodies. Blood product orders may be delayed. Draw one red top and two purple top tubes for all type/screen/crossmatch orders.  Patient has anti-Cw, anti-K (Angella), Warm auto and nonspecific antibodies. Blood products may be delayed. Draw patient 24 hours prior to transfusion. Draw one red top and two purple top tubes for all type and screen orders.     Clavulanic Acid Angioedema     Fentanyl Itching     Naltrexone Other (See Comments)     Reaction(s): Vivid dreams.     Other Drug Allergy (See Comments)      See original file MRN [id number]. Files are marked for merge     Oxycodone Swelling     Adhesive Tape Rash     Silicone type     Band-Aid Anti-Itch      Other reaction(s): adhesive allergy     Cephalosporins Rash     Lamotrigine Rash     Possibly associated with Lamictal.   HUT Comment: Possibly associated with Lamictal. ; HUT Reaction: Rash; HUT Reaction Type:  Allergy; HUT Severity: Low; [facility] Noted: [id number]

## 2023-01-31 NOTE — CONSULTS
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 breath / dyspnea or wheezing   Yes Unknown, Entered By History   alum & mag hydroxide-simethicone (MAALOX MAX) 400-400-40 MG/5ML SUSP suspension Take 15 mLs by mouth every 6 hours as needed for heartburn or other (sore throat) 8/22/22  Yes Inessa Barlow MD   BANOPHEN 2-0.1 % external cream Apply 1  applicator topically 2 times daily as needed for itching 12/16/22  Yes Unknown, Entered By History   brexpiprazole (REXULTI) 2 MG tablet Take 2 mg by mouth every evening   Yes Unknown, Entered By History   busPIRone (BUSPAR) 10 MG tablet Take 2 tablets (20 mg) by mouth 3 times daily  Patient taking differently: Take 20 mg by mouth 2 times daily 2/16/22  Yes Marian Ledesma MD   cetirizine (ZYRTEC) 10 MG tablet Take 1 tablet (10 mg) by mouth daily  Patient taking differently: Take 10 mg by mouth every evening 2/16/22  Yes Marian Ledesma MD   Cholecalciferol (D3 HIGH POTENCY) 25 MCG (1000 UT) CAPS Take 50 mcg by mouth daily 12/19/22  Yes Unknown, Entered By History   cholestyramine (QUESTRAN) 4 g packet Take 1 packet (4 g) by mouth 3 times daily (with meals) 1/30/23 1/30/24 Yes Felipe Ulloa DO   desvenlafaxine (PRISTIQ) 100 MG 24 hr tablet Take 1 tablet (100 mg) by mouth daily 2/17/22  Yes Marian Ledesma MD   ferrous sulfate (FEROSUL) 325 (65 Fe) MG tablet Take 1 tablet (325 mg) by mouth daily (with breakfast) 2/17/22  Yes Marian Ledesma MD   fluocinonide (LIDEX) 0.05 % external cream Apply 1 Application topically See Admin Instructions Apply thinly to knee 2 times daily, Monday through Fridays, off on weekends as needed. Avoid face and skin folds. 1/10/23  Yes Unknown, Entered By History   furosemide (LASIX) 20 MG tablet Take 20 mg by mouth daily 10/1/22  Yes Reported, Patient   hydroxychloroquine (PLAQUENIL) 200 MG tablet Take 1 tablet (200 mg) by mouth 2 times daily  Patient taking differently: Take 400 mg by mouth daily 2/16/22  Yes Marian Ledesma MD   ibuprofen (ADVIL/MOTRIN) 600 MG tablet Take 1 tablet (600 mg) by mouth every 8 hours as needed for moderate pain 10/18/22  Yes Madeline Hartmann MD   meclizine (ANTIVERT) 25 MG tablet Take 1 tablet (25 mg) by mouth every 6 hours as needed for dizziness 1/15/23  Yes Reece Flynn MD   medroxyPROGESTERone (PROVERA) 10 MG tablet Take 1 tablet (10 mg) by mouth  daily 9/25/22  Yes Karime More MD   metFORMIN (GLUCOPHAGE XR) 500 MG 24 hr tablet Take 1,000 mg by mouth daily (with dinner) Take at 5 pm.   Yes Unknown, Entered By History   methocarbamol (ROBAXIN) 500 MG tablet Take 1 tablet (500 mg) by mouth 4 times daily as needed 1/5/23  Yes Brice Goncalves DO   montelukast (SINGULAIR) 10 MG tablet Take 10 mg by mouth every evening 6/21/22  Yes Reported, Patient   OLANZapine (ZYPREXA) 2.5 MG tablet Take 1.25 mg by mouth daily (with dinner) At 5pm 9/28/22  Yes Reported, Patient   omeprazole (PRILOSEC) 40 MG DR capsule Take 1 capsule (40 mg) by mouth daily 1/30/23 1/30/24 Yes Felipe Ulloa,    ondansetron (ZOFRAN-ODT) 4 MG ODT tab Take 1 tablet (4 mg) by mouth every 8 hours as needed for nausea 2/16/22  Yes Marian Ledesma MD   pregabalin (LYRICA) 100 MG capsule Take 1 capsule (100 mg) by mouth 3 times daily 2/16/22  Yes Marian Ledesma MD   Respiratory Therapy Supplies (NEBULIZER) BRENDAN Nebulizer device.  Albuterol nebulization every 2 hours as needed for shortness of breath or wheezing. 1/14/22  Yes Nish Saucedo MD   Semaglutide 3 MG TABS Take 3 mg by mouth daily before breakfast Then 7mg daily for second month. Then 14 mg daily 1/12/23  Yes Unknown, Entered By History   SUMAtriptan (IMITREX) 25 MG tablet Take 25 mg by mouth at onset of headache for migraine May repeat in 2 hours. 1/26/22  Yes Reported, Patient   valACYclovir (VALTREX) 1000 mg tablet Take 2 tablets by mouth two times daily for one day. Use as needed at onset of cold sore.    Yes Unknown, Entered By History              Review of Systems:   The Review of Systems is negative other than noted in the HPI            Physical Exam:     Patient Vitals for the past 24 hrs:   BP Temp Temp src Pulse Resp SpO2 Weight   01/31/23 0727 114/59 97.8  F (36.6  C) Oral 96 18 96 % --   01/31/23 0550 119/70 98  F (36.7  C) Oral 93 20 95 % --   01/30/23 2300 (!) 143/77 98.2  F (36.8  C) Oral 98 20 95 % --    01/30/23 2035 -- -- -- 108 -- 99 % --   01/30/23 2029 (!) 133/91 -- -- 113 20 98 % 140.6 kg (310 lb)          Intake/Output Summary (Last 24 hours) at 1/31/2023 1321  Last data filed at 1/31/2023 0900  Gross per 24 hour   Intake 0 ml   Output 300 ml   Net -300 ml      Constitutional:   awake, alert, cooperative, no apparent distress, and appears stated age             Abdomen:   Obsese. Soft. Tender luq, rlq and llq. No rebound.    Digital exam I am able to palpate the tip of the razor handle.  It is out of reach.  With patient patient's permission I used an anoscope and try to get this area again with a ring forcep and gently probed to see if I can grab onto the handle.  I was able to remove some stool but was firm.  Not able to remove the razor handle.  I attempted this at least 3 times.  I then had her get a Dulcolax suppository and returned 2 hours later.  She did not have a bowel movement.  I then tried again with anoscope examination and digital exam and the foreign body is definitely further down into the rectum however still not able to grasp.  I alternated between digital manipulation which I could actually get more of a handle on the object however was still quite slippery.  I could palpate the object just superior to my finger and tried using 2 fingers to grasp the handle however this did not work.  I then tried using my finger to guide a ring forcep over the top of the finger where I can feel the object.  I was able to grasp only stool that had been surrounding the object.  Patient was started to feel sore and wanted to take a break and see if she can have a bowel movement.  She had a bowel movement soon after and was able to pass the object.   Approximately spent 40 minutes between the 2 individual times that I spent with anoscope and digital manipulation to remove the object.       Skin:   Skin color and texture normal for patient, no rashes or lesions              Data:         All imaging studies  reviewed by me.  Reviewed x-ray from yesterday showing foreign body in pelvis as well as repeat x-ray today showing no change in position of handle    Results for orders placed or performed during the hospital encounter of 01/30/23 (from the past 24 hour(s))   XR Abdomen Port 1 View    Narrative    EXAM: XR ABDOMEN PORT 1 VIEW  LOCATION: [facility]  DATE/TIME: 1/30/2023 8:55 PM    INDICATION: rectal foreign body  COMPARISON: None.      Impression    IMPRESSION: 4.4 mm radiopaque metallic foreign body projects over the sacrum, may reflect rectal foreign body.   CBC with platelets differential    Narrative    The following orders were created for panel order CBC with platelets differential.  Procedure                               Abnormality         Status                     ---------                               -----------         ------                     CBC with platelets and d...[[id number]]  Abnormal            Final result                 Please view results for these tests on the individual orders.   CBC with platelets and differential   Result Value Ref Range    WBC Count 5.9 4.0 - 11.0 10e3/uL    RBC Count 4.53 3.80 - 5.20 10e6/uL    Hemoglobin 13.0 11.7 - 15.7 g/dL    Hematocrit 41.5 35.0 - 47.0 %    MCV 92 78 - 100 fL    MCH 28.7 26.5 - 33.0 pg    MCHC 31.3 (L) 31.5 - 36.5 g/dL    RDW 13.9 10.0 - 15.0 %    Platelet Count 229 150 - 450 10e3/uL    % Neutrophils 57 %    % Lymphocytes 29 %    % Monocytes 9 %    % Eosinophils 3 %    % Basophils 1 %    % Immature Granulocytes 1 %    NRBCs per 100 WBC 0 <1 /100    Absolute Neutrophils 3.4 1.6 - 8.3 10e3/uL    Absolute Lymphocytes 1.7 0.8 - 5.3 10e3/uL    Absolute Monocytes 0.5 0.0 - 1.3 10e3/uL    Absolute Eosinophils 0.2 0.0 - 0.7 10e3/uL    Absolute Basophils 0.1 0.0 - 0.2 10e3/uL    Absolute Immature Granulocytes 0.1 <=0.4 10e3/uL    Absolute NRBCs 0.0 10e3/uL   Extra Tube ([place] Draw)    Narrative    The following orders were  created for panel order Extra Tube ([place] Draw).  Procedure                               Abnormality         Status                     ---------                               -----------         ------                     Extra Green Top (Lithium...[[id number]]                      Final result                 Please view results for these tests on the individual orders.   Extra Green Top (Lithium Heparin) Tube   Result Value Ref Range    Hold Specimen JIC    XR Abdomen Port 1 View    Narrative    EXAM: XR ABDOMEN PORT 1 VIEW  LOCATION: [facility]  DATE/TIME: 1/31/2023 8:39 AM    INDICATION: recheck location of foreign body  COMPARISON: 01/30/2023.      Impression    IMPRESSION: No bowel obstruction or free intraperitoneal air on this single view. Rectangular metallic foreign body measuring 5 mm is unchanged in position projecting over the right lower sacrum in the expected location of the rectum.   Asymptomatic COVID-19 Virus (Coronavirus) by PCR Nasopharyngeal    Specimen: Nasopharyngeal; Swab   Result Value Ref Range    SARS CoV2 PCR Negative Negative    Narrative    Testing was performed using the Xpert Xpress SARS-CoV-2 Assay on the Cepheid Gene-Xpert Instrument Systems. Additional information about this Emergency Use Authorization (EUA) assay can be found via the Lab Guide. This test should be ordered for the detection of SARS-CoV-2 in individuals who meet SARS-CoV-2 clinical and/or epidemiological criteria as well as from individuals without symptoms or other reasons to suspect COVID-19. Test performance for asymptomatic patients has only been established in anterior nasal swab specimens. This test is for in vitro diagnostic use under the FDA EUA for laboratories certified under CLIA to perform high complexity testing. This test has not been FDA cleared or approved. A negative result does not rule out the presence of PCR inhibitors in the specimen or target RNA concentration below  the limit of detection for the assay. The possibility of a false negative should be considered if the patient's recent exposure or clinical presentation suggests COVID-19. This test was validated by the [facility]. This laboratory is certified under the Clinical Laboratory Improvement Amendments (CLIA) as qualified to perform high complexity laboratory testing.       *Note: Due to a large number of results and/or encounters for the requested time period, some results have not been displayed. A complete set of results can be found in Results Review.        REUBEN Magallon  [facility]  [address] [place] [id number]  Phone- [phone]  Fax- [phone]

## 2023-02-01 ENCOUNTER — PATIENT OUTREACH (OUTPATIENT)
Dept: CARE COORDINATION | Facility: CLINIC | Age: 33
End: 2023-02-01
Payer: COMMERCIAL

## 2023-02-01 NOTE — PROGRESS NOTES
[facility] Care Resource [place]    Background: Transitional Care Management program identified per system criteria and reviewed by [facility] Resource [place] team for possible outreach.    Assessment: Upon chart review, [facility] Team member will not proceed with patient outreach related to this episode of Transitional Care Management program due to reason below:    Patient has discharged to a Memory Care, Long-term Care, Assisted Living or Group Home where patient is receiving on-site support with their daily cares, including support with hospital follow up plan.    Plan: Transitional Care Management episode addressed appropriately per reason noted above.      LAKESHA Nino  Connected Care Resource [facility]    *Connected Care Resource Team does NOT follow patient ongoing. Referrals are identified based on internal discharge reports and the outreach is to ensure patient has an understanding of their discharge instructions.

## 2023-02-03 ENCOUNTER — TELEPHONE (OUTPATIENT)
Dept: GASTROENTEROLOGY | Facility: CLINIC | Age: 33
End: 2023-02-03
Payer: COMMERCIAL

## 2023-02-07 NOTE — ED TRIAGE NOTES
Group home staff dropped pt off. Pt was trying to pleasure herself and got small glass cleaning spray bottle stuck in her rectum earlier this afternoon   stated

## 2023-02-10 ENCOUNTER — HOSPITAL ENCOUNTER (OUTPATIENT)
Facility: CLINIC | Age: 33
Setting detail: OBSERVATION
Discharge: NURSING, REHAB OR LONG-TERM CARE | End: 2023-02-11
Attending: EMERGENCY MEDICINE | Admitting: INTERNAL MEDICINE
Payer: COMMERCIAL

## 2023-02-10 ENCOUNTER — NURSE TRIAGE (OUTPATIENT)
Dept: NURSING | Facility: CLINIC | Age: 33
End: 2023-02-10
Payer: COMMERCIAL

## 2023-02-10 ENCOUNTER — APPOINTMENT (OUTPATIENT)
Dept: RADIOLOGY | Facility: CLINIC | Age: 33
End: 2023-02-10
Attending: EMERGENCY MEDICINE
Payer: COMMERCIAL

## 2023-02-10 DIAGNOSIS — T18.5XXA RECTAL FOREIGN BODY, INITIAL ENCOUNTER: ICD-10-CM

## 2023-02-10 LAB
ALBUMIN SERPL-MCNC: 3.8 G/DL (ref 3.5–5)
ALBUMIN UR-MCNC: 70 MG/DL
ALP SERPL-CCNC: 80 U/L (ref 45–120)
ALT SERPL W P-5'-P-CCNC: 69 U/L (ref 0–45)
ANION GAP SERPL CALCULATED.3IONS-SCNC: 6 MMOL/L (ref 5–18)
APPEARANCE UR: CLEAR
AST SERPL W P-5'-P-CCNC: 38 U/L (ref 0–40)
BACTERIA #/AREA URNS HPF: ABNORMAL /HPF
BASOPHILS # BLD AUTO: 0 10E3/UL (ref 0–0.2)
BASOPHILS NFR BLD AUTO: 0 %
BILIRUB SERPL-MCNC: 0.4 MG/DL (ref 0–1)
BILIRUB UR QL STRIP: NEGATIVE
BUN SERPL-MCNC: 8 MG/DL (ref 8–22)
CALCIUM SERPL-MCNC: 9.5 MG/DL (ref 8.5–10.5)
CHLORIDE BLD-SCNC: 107 MMOL/L (ref 98–107)
CO2 SERPL-SCNC: 27 MMOL/L (ref 22–31)
COLOR UR AUTO: ABNORMAL
CREAT SERPL-MCNC: 0.63 MG/DL (ref 0.6–1.1)
EOSINOPHIL # BLD AUTO: 0.2 10E3/UL (ref 0–0.7)
EOSINOPHIL NFR BLD AUTO: 2 %
ERYTHROCYTE [DISTWIDTH] IN BLOOD BY AUTOMATED COUNT: 13.9 % (ref 10–15)
GFR SERPL CREATININE-BSD FRML MDRD: >90 ML/MIN/1.73M2
GLUCOSE BLD-MCNC: 96 MG/DL (ref 70–125)
GLUCOSE UR STRIP-MCNC: NEGATIVE MG/DL
HCG SERPL QL: NEGATIVE
HCT VFR BLD AUTO: 38.4 % (ref 35–47)
HGB BLD-MCNC: 12.8 G/DL (ref 11.7–15.7)
HGB UR QL STRIP: NEGATIVE
IMM GRANULOCYTES # BLD: 0 10E3/UL
IMM GRANULOCYTES NFR BLD: 0 %
KETONES UR STRIP-MCNC: NEGATIVE MG/DL
LEUKOCYTE ESTERASE UR QL STRIP: NEGATIVE
LIPASE SERPL-CCNC: 22 U/L (ref 0–52)
LYMPHOCYTES # BLD AUTO: 2 10E3/UL (ref 0.8–5.3)
LYMPHOCYTES NFR BLD AUTO: 21 %
MAGNESIUM SERPL-MCNC: 1.8 MG/DL (ref 1.8–2.6)
MCH RBC QN AUTO: 28.4 PG (ref 26.5–33)
MCHC RBC AUTO-ENTMCNC: 33.3 G/DL (ref 31.5–36.5)
MCV RBC AUTO: 85 FL (ref 78–100)
MONOCYTES # BLD AUTO: 0.6 10E3/UL (ref 0–1.3)
MONOCYTES NFR BLD AUTO: 7 %
MUCOUS THREADS #/AREA URNS LPF: PRESENT /LPF
NEUTROPHILS # BLD AUTO: 6.3 10E3/UL (ref 1.6–8.3)
NEUTROPHILS NFR BLD AUTO: 70 %
NITRATE UR QL: NEGATIVE
NRBC # BLD AUTO: 0 10E3/UL
NRBC BLD AUTO-RTO: 0 /100
PH UR STRIP: 6 [PH] (ref 5–7)
PLATELET # BLD AUTO: 271 10E3/UL (ref 150–450)
POTASSIUM BLD-SCNC: 3.8 MMOL/L (ref 3.5–5)
PROT SERPL-MCNC: 6.8 G/DL (ref 6–8)
RBC # BLD AUTO: 4.51 10E6/UL (ref 3.8–5.2)
RBC URINE: 0 /HPF
SODIUM SERPL-SCNC: 140 MMOL/L (ref 136–145)
SP GR UR STRIP: 1.01 (ref 1–1.03)
SQUAMOUS EPITHELIAL: <1 /HPF
UROBILINOGEN UR STRIP-MCNC: <2 MG/DL
WBC # BLD AUTO: 9.1 10E3/UL (ref 4–11)
WBC URINE: 1 /HPF

## 2023-02-10 PROCEDURE — 83690 ASSAY OF LIPASE: CPT | Performed by: INTERNAL MEDICINE

## 2023-02-10 PROCEDURE — 85025 COMPLETE CBC W/AUTO DIFF WBC: CPT | Performed by: INTERNAL MEDICINE

## 2023-02-10 PROCEDURE — 99223 1ST HOSP IP/OBS HIGH 75: CPT | Performed by: INTERNAL MEDICINE

## 2023-02-10 PROCEDURE — 96374 THER/PROPH/DIAG INJ IV PUSH: CPT

## 2023-02-10 PROCEDURE — 250N000013 HC RX MED GY IP 250 OP 250 PS 637: Performed by: INTERNAL MEDICINE

## 2023-02-10 PROCEDURE — 84703 CHORIONIC GONADOTROPIN ASSAY: CPT | Performed by: INTERNAL MEDICINE

## 2023-02-10 PROCEDURE — 83735 ASSAY OF MAGNESIUM: CPT | Performed by: INTERNAL MEDICINE

## 2023-02-10 PROCEDURE — 36415 COLL VENOUS BLD VENIPUNCTURE: CPT | Performed by: INTERNAL MEDICINE

## 2023-02-10 PROCEDURE — 81003 URINALYSIS AUTO W/O SCOPE: CPT | Performed by: EMERGENCY MEDICINE

## 2023-02-10 PROCEDURE — 74019 RADEX ABDOMEN 2 VIEWS: CPT

## 2023-02-10 PROCEDURE — 250N000011 HC RX IP 250 OP 636: Performed by: INTERNAL MEDICINE

## 2023-02-10 PROCEDURE — 250N000013 HC RX MED GY IP 250 OP 250 PS 637: Performed by: EMERGENCY MEDICINE

## 2023-02-10 PROCEDURE — 258N000003 HC RX IP 258 OP 636: Performed by: INTERNAL MEDICINE

## 2023-02-10 PROCEDURE — 99285 EMERGENCY DEPT VISIT HI MDM: CPT | Mod: 25

## 2023-02-10 PROCEDURE — 80053 COMPREHEN METABOLIC PANEL: CPT | Performed by: INTERNAL MEDICINE

## 2023-02-10 PROCEDURE — G0378 HOSPITAL OBSERVATION PER HR: HCPCS

## 2023-02-10 RX ORDER — TRAMADOL HYDROCHLORIDE 50 MG/1
50 TABLET ORAL EVERY 6 HOURS PRN
Status: DISCONTINUED | OUTPATIENT
Start: 2023-02-10 | End: 2023-02-11

## 2023-02-10 RX ORDER — MONTELUKAST SODIUM 10 MG/1
10 TABLET ORAL EVERY EVENING
Status: DISCONTINUED | OUTPATIENT
Start: 2023-02-10 | End: 2023-02-11 | Stop reason: HOSPADM

## 2023-02-10 RX ORDER — SODIUM CHLORIDE 9 MG/ML
INJECTION, SOLUTION INTRAVENOUS CONTINUOUS
Status: DISCONTINUED | OUTPATIENT
Start: 2023-02-10 | End: 2023-02-11 | Stop reason: HOSPADM

## 2023-02-10 RX ORDER — CETIRIZINE HYDROCHLORIDE 10 MG/1
10 TABLET ORAL EVERY EVENING
Status: DISCONTINUED | OUTPATIENT
Start: 2023-02-10 | End: 2023-02-11 | Stop reason: HOSPADM

## 2023-02-10 RX ORDER — PREGABALIN 100 MG/1
100 CAPSULE ORAL 3 TIMES DAILY
Status: DISCONTINUED | OUTPATIENT
Start: 2023-02-10 | End: 2023-02-11 | Stop reason: HOSPADM

## 2023-02-10 RX ORDER — BISACODYL 5 MG
10 TABLET, DELAYED RELEASE (ENTERIC COATED) ORAL ONCE
Status: COMPLETED | OUTPATIENT
Start: 2023-02-10 | End: 2023-02-10

## 2023-02-10 RX ORDER — METFORMIN HCL 500 MG
1000 TABLET, EXTENDED RELEASE 24 HR ORAL
Status: DISCONTINUED | OUTPATIENT
Start: 2023-02-11 | End: 2023-02-11 | Stop reason: HOSPADM

## 2023-02-10 RX ORDER — NICOTINE POLACRILEX 4 MG
15-30 LOZENGE BUCCAL
Status: DISCONTINUED | OUTPATIENT
Start: 2023-02-10 | End: 2023-02-11 | Stop reason: HOSPADM

## 2023-02-10 RX ORDER — DEXTROSE MONOHYDRATE 25 G/50ML
25-50 INJECTION, SOLUTION INTRAVENOUS
Status: DISCONTINUED | OUTPATIENT
Start: 2023-02-10 | End: 2023-02-11 | Stop reason: HOSPADM

## 2023-02-10 RX ORDER — HYDROMORPHONE HCL IN WATER/PF 6 MG/30 ML
0.2 PATIENT CONTROLLED ANALGESIA SYRINGE INTRAVENOUS EVERY 4 HOURS PRN
Status: DISCONTINUED | OUTPATIENT
Start: 2023-02-10 | End: 2023-02-11

## 2023-02-10 RX ORDER — ONDANSETRON 2 MG/ML
4 INJECTION INTRAMUSCULAR; INTRAVENOUS EVERY 6 HOURS PRN
Status: DISCONTINUED | OUTPATIENT
Start: 2023-02-10 | End: 2023-02-11 | Stop reason: HOSPADM

## 2023-02-10 RX ORDER — CHOLESTYRAMINE 4 G/9G
1 POWDER, FOR SUSPENSION ORAL
Status: DISCONTINUED | OUTPATIENT
Start: 2023-02-11 | End: 2023-02-11 | Stop reason: HOSPADM

## 2023-02-10 RX ORDER — FERROUS SULFATE 325(65) MG
325 TABLET ORAL
Status: DISCONTINUED | OUTPATIENT
Start: 2023-02-11 | End: 2023-02-11 | Stop reason: HOSPADM

## 2023-02-10 RX ORDER — PANTOPRAZOLE SODIUM 40 MG/1
40 TABLET, DELAYED RELEASE ORAL 2 TIMES DAILY
Status: DISCONTINUED | OUTPATIENT
Start: 2023-02-11 | End: 2023-02-11 | Stop reason: HOSPADM

## 2023-02-10 RX ORDER — ACETAMINOPHEN 325 MG/1
650 TABLET ORAL EVERY 4 HOURS PRN
Status: DISCONTINUED | OUTPATIENT
Start: 2023-02-10 | End: 2023-02-11 | Stop reason: HOSPADM

## 2023-02-10 RX ORDER — ACETAMINOPHEN 325 MG/1
650 TABLET ORAL ONCE
Status: COMPLETED | OUTPATIENT
Start: 2023-02-10 | End: 2023-02-10

## 2023-02-10 RX ORDER — MEDROXYPROGESTERONE ACETATE 5 MG
10 TABLET ORAL DAILY
Status: DISCONTINUED | OUTPATIENT
Start: 2023-02-11 | End: 2023-02-11 | Stop reason: HOSPADM

## 2023-02-10 RX ORDER — VITAMIN B COMPLEX
50 TABLET ORAL DAILY
Status: DISCONTINUED | OUTPATIENT
Start: 2023-02-11 | End: 2023-02-11 | Stop reason: HOSPADM

## 2023-02-10 RX ORDER — TRAMADOL HYDROCHLORIDE 50 MG/1
50 TABLET ORAL ONCE
Status: COMPLETED | OUTPATIENT
Start: 2023-02-10 | End: 2023-02-10

## 2023-02-10 RX ORDER — FUROSEMIDE 20 MG
20 TABLET ORAL DAILY
Status: DISCONTINUED | OUTPATIENT
Start: 2023-02-11 | End: 2023-02-11 | Stop reason: HOSPADM

## 2023-02-10 RX ORDER — ONDANSETRON 4 MG/1
4 TABLET, ORALLY DISINTEGRATING ORAL EVERY 6 HOURS PRN
Status: DISCONTINUED | OUTPATIENT
Start: 2023-02-10 | End: 2023-02-11 | Stop reason: HOSPADM

## 2023-02-10 RX ORDER — DESVENLAFAXINE 50 MG/1
100 TABLET, FILM COATED, EXTENDED RELEASE ORAL DAILY
Status: DISCONTINUED | OUTPATIENT
Start: 2023-02-11 | End: 2023-02-11 | Stop reason: HOSPADM

## 2023-02-10 RX ADMIN — TRAMADOL HYDROCHLORIDE 50 MG: 50 TABLET, COATED ORAL at 18:58

## 2023-02-10 RX ADMIN — MONTELUKAST 10 MG: 10 TABLET, FILM COATED ORAL at 22:31

## 2023-02-10 RX ADMIN — PREGABALIN 100 MG: 100 CAPSULE ORAL at 22:31

## 2023-02-10 RX ADMIN — HYDROMORPHONE HYDROCHLORIDE 0.2 MG: 0.2 INJECTION, SOLUTION INTRAMUSCULAR; INTRAVENOUS; SUBCUTANEOUS at 22:12

## 2023-02-10 RX ADMIN — CETIRIZINE HYDROCHLORIDE 10 MG: 10 TABLET, FILM COATED ORAL at 22:30

## 2023-02-10 RX ADMIN — BISACODYL 10 MG: 5 TABLET, COATED ORAL at 22:12

## 2023-02-10 RX ADMIN — SODIUM CHLORIDE: 9 INJECTION, SOLUTION INTRAVENOUS at 22:11

## 2023-02-10 ASSESSMENT — ACTIVITIES OF DAILY LIVING (ADL)
ADLS_ACUITY_SCORE: 40

## 2023-02-10 NOTE — TELEPHONE ENCOUNTER
Nevin calls and says that she has back pain and flank pain. Pt. Says that it hurts her back when she urinates. Symptom began yesterday. Denies any bleeding with urination. Pt. Says that she will have someone take her to an  clinic this evening. COVID 19 Nurse Triage Plan/Patient Instructions    Please be aware that novel coronavirus (COVID-19) may be circulating in the community. If you develop symptoms such as fever, cough, or SOB or if you have concerns about the presence of another infection including coronavirus (COVID-19), please contact your health care provider or visit [email or web address].     Disposition/Instructions    In-Person Visit with provider recommended. Reference Visit Selection Guide.    Thank you for taking steps to prevent the spread of this virus.  o Limit your contact with others.  o Wear a simple mask to cover your cough.  o Wash your hands well and often.    Resources    [facility]: About COVID-19: [email or web address]    CDC: What to Do If You're Sick: www.cdc.gov/coronavirus/2019-ncov/about/steps-when-sick.html    CDC: Ending Home Isolation: www.cdc.gov/coronavirus/2019-ncov/hcp/disposition-in-home-patients.html     CDC: Caring for Someone: www.cdc.gov/coronavirus/2019-ncov/if-you-are-sick/care-for-someone.html     [facility]: Interim Guidance for Hospital Discharge to Home: www.health.[facility].mn.us/diseases/coronavirus/hcp/hospdischarge.pdf    [facility] clinical trials (COVID-19 research studies): [email or web address]     Below are the COVID-19 hotlines at the [facility] ([facility]). Interpreters are available.   o For health questions: Call 418-270-1861 or 1-732.129.8064 (7 a.m. to 7 p.m.)  o For questions about schools and childcare: Call 837-437-6688 or 1-643.994.5994 (7 a.m. to 7 p.m.)                     Reason for Disposition    Pain mainly in flank (i.e., in the side, over the lower ribs or just below the  ribs)    MODERATE pain (e.g., interferes with normal activities or awakens from sleep)    Additional Information    Negative: Passed out (i.e., lost consciousness, collapsed and was not responding)    Negative: Shock suspected (e.g., cold/pale/clammy skin, too weak to stand, low BP, rapid pulse)    Negative: Difficult to awaken or acting confused (e.g., disoriented, slurred speech)    Negative: Sounds like a life-threatening emergency to the triager    Negative: Followed a major injury to the back (e.g., MVA, fall > 10 feet or 3 meters, penetrating injury, etc.)    Negative: Back pain or flank pain during pregnancy    Negative: Passed out (i.e., lost consciousness, collapsed and was not responding)    Negative: Shock suspected (e.g., cold/pale/clammy skin, too weak to stand, low BP, rapid pulse)    Negative: Sounds like a life-threatening emergency to the triager    Negative: Major injury to the back (e.g., MVA, fall > 10 feet or 3 meters, penetrating injury, etc.)    Negative: Followed a tailbone injury    Negative: [1] Pain in the upper back over the ribs (rib cage) AND [2] radiates (travels, goes) into chest    Negative: [1] Pain in the upper back over the ribs (rib cage) AND [2] worsened by coughing (or clearly increases with breathing)    Negative: Back pain during pregnancy    Negative: Upper, mid or lower back pain that occurs mainly in the midline    Negative: [1] SEVERE pain (e.g., excruciating, scale 8-10) AND [2] present > 1 hour    Negative: [1] SEVERE pain (e.g., excruciating, scale 8-10) AND [2] not improved after pain medicine    Negative: [1] Sudden onset of severe flank pain AND [2] age > 60 years    Negative: [1] Abdominal pain AND [2] age > 60 years    Negative: [1] Unable to urinate (or only a few drops) > 4 hours AND [2] bladder feels very full (e.g., palpable bladder or strong urge to urinate)    Negative: Vomiting    Negative: Weakness of a leg or foot (e.g., unable to bear weight, dragging  foot)    Negative: Patient sounds very sick or weak to the triager    Negative: Fever > 100.4 F (38.0 C)    Negative: Pain or burning with passing urine (urination)    Protocols used: BACK PAIN-A-AH, FLANK PAIN-A-AH

## 2023-02-11 VITALS
OXYGEN SATURATION: 95 % | TEMPERATURE: 98.2 F | RESPIRATION RATE: 16 BRPM | SYSTOLIC BLOOD PRESSURE: 127 MMHG | HEART RATE: 103 BPM | DIASTOLIC BLOOD PRESSURE: 64 MMHG

## 2023-02-11 LAB — GLUCOSE BLDC GLUCOMTR-MCNC: 96 MG/DL (ref 70–99)

## 2023-02-11 PROCEDURE — 96376 TX/PRO/DX INJ SAME DRUG ADON: CPT

## 2023-02-11 PROCEDURE — 258N000003 HC RX IP 258 OP 636: Performed by: INTERNAL MEDICINE

## 2023-02-11 PROCEDURE — G0378 HOSPITAL OBSERVATION PER HR: HCPCS

## 2023-02-11 PROCEDURE — 82962 GLUCOSE BLOOD TEST: CPT

## 2023-02-11 PROCEDURE — 45307 PROCTOSIGMOIDOSCOPY FB: CPT

## 2023-02-11 PROCEDURE — 250N000013 HC RX MED GY IP 250 OP 250 PS 637: Performed by: EMERGENCY MEDICINE

## 2023-02-11 PROCEDURE — 45332 SIGMOIDOSCOPY W/FB REMOVAL: CPT | Performed by: SURGERY

## 2023-02-11 PROCEDURE — 250N000011 HC RX IP 250 OP 636: Performed by: INTERNAL MEDICINE

## 2023-02-11 PROCEDURE — 250N000013 HC RX MED GY IP 250 OP 250 PS 637: Performed by: INTERNAL MEDICINE

## 2023-02-11 PROCEDURE — 99213 OFFICE O/P EST LOW 20 MIN: CPT | Mod: 25 | Performed by: SURGERY

## 2023-02-11 PROCEDURE — 99239 HOSP IP/OBS DSCHRG MGMT >30: CPT | Performed by: FAMILY MEDICINE

## 2023-02-11 RX ORDER — KETOROLAC TROMETHAMINE 15 MG/ML
15 INJECTION, SOLUTION INTRAMUSCULAR; INTRAVENOUS EVERY 6 HOURS PRN
Status: DISCONTINUED | OUTPATIENT
Start: 2023-02-11 | End: 2023-02-11 | Stop reason: HOSPADM

## 2023-02-11 RX ORDER — TRAMADOL HYDROCHLORIDE 50 MG/1
50-100 TABLET ORAL EVERY 6 HOURS PRN
Status: DISCONTINUED | OUTPATIENT
Start: 2023-02-11 | End: 2023-02-11 | Stop reason: HOSPADM

## 2023-02-11 RX ORDER — POLYETHYLENE GLYCOL 3350 17 G/17G
17 POWDER, FOR SOLUTION ORAL DAILY
Status: DISCONTINUED | OUTPATIENT
Start: 2023-02-11 | End: 2023-02-11 | Stop reason: HOSPADM

## 2023-02-11 RX ORDER — BUSPIRONE HYDROCHLORIDE 10 MG/1
20 TABLET ORAL 2 TIMES DAILY
Status: DISCONTINUED | OUTPATIENT
Start: 2023-02-11 | End: 2023-02-11 | Stop reason: HOSPADM

## 2023-02-11 RX ORDER — AMOXICILLIN 250 MG
1 CAPSULE ORAL 2 TIMES DAILY
Status: DISCONTINUED | OUTPATIENT
Start: 2023-02-11 | End: 2023-02-11 | Stop reason: HOSPADM

## 2023-02-11 RX ORDER — HYDROMORPHONE HYDROCHLORIDE 1 MG/ML
0.3 INJECTION, SOLUTION INTRAMUSCULAR; INTRAVENOUS; SUBCUTANEOUS EVERY 4 HOURS PRN
Status: DISCONTINUED | OUTPATIENT
Start: 2023-02-11 | End: 2023-02-11

## 2023-02-11 RX ORDER — MECLIZINE HYDROCHLORIDE 25 MG/1
25 TABLET ORAL EVERY 6 HOURS PRN
Status: DISCONTINUED | OUTPATIENT
Start: 2023-02-11 | End: 2023-02-11 | Stop reason: HOSPADM

## 2023-02-11 RX ORDER — HYDROMORPHONE HCL IN WATER/PF 6 MG/30 ML
0.2 PATIENT CONTROLLED ANALGESIA SYRINGE INTRAVENOUS EVERY 4 HOURS PRN
Status: COMPLETED | OUTPATIENT
Start: 2023-02-11 | End: 2023-02-11

## 2023-02-11 RX ORDER — LACTULOSE 10 G/15ML
20 SOLUTION ORAL 2 TIMES DAILY PRN
Status: DISCONTINUED | OUTPATIENT
Start: 2023-02-11 | End: 2023-02-11 | Stop reason: HOSPADM

## 2023-02-11 RX ORDER — ALUMINA, MAGNESIA, AND SIMETHICONE 2400; 2400; 240 MG/30ML; MG/30ML; MG/30ML
15 SUSPENSION ORAL EVERY 6 HOURS PRN
Status: DISCONTINUED | OUTPATIENT
Start: 2023-02-11 | End: 2023-02-11 | Stop reason: HOSPADM

## 2023-02-11 RX ORDER — HYDROMORPHONE HCL IN WATER/PF 6 MG/30 ML
0.2 PATIENT CONTROLLED ANALGESIA SYRINGE INTRAVENOUS ONCE
Status: COMPLETED | OUTPATIENT
Start: 2023-02-11 | End: 2023-02-11

## 2023-02-11 RX ORDER — HYDROXYCHLOROQUINE SULFATE 200 MG/1
400 TABLET, FILM COATED ORAL DAILY
Status: DISCONTINUED | OUTPATIENT
Start: 2023-02-11 | End: 2023-02-11 | Stop reason: HOSPADM

## 2023-02-11 RX ADMIN — DESVENLAFAXINE 100 MG: 50 TABLET, FILM COATED, EXTENDED RELEASE ORAL at 08:09

## 2023-02-11 RX ADMIN — CHOLECALCIFEROL TAB 25 MCG (1000 UNIT) 50 MCG: 25 TAB at 08:12

## 2023-02-11 RX ADMIN — POLYETHYLENE GLYCOL 3350 17 G: 17 POWDER, FOR SOLUTION ORAL at 08:05

## 2023-02-11 RX ADMIN — SENNOSIDES AND DOCUSATE SODIUM 1 TABLET: 50; 8.6 TABLET ORAL at 08:13

## 2023-02-11 RX ADMIN — PANTOPRAZOLE SODIUM 40 MG: 40 TABLET, DELAYED RELEASE ORAL at 08:12

## 2023-02-11 RX ADMIN — HYDROMORPHONE HYDROCHLORIDE 0.2 MG: 0.2 INJECTION, SOLUTION INTRAMUSCULAR; INTRAVENOUS; SUBCUTANEOUS at 08:03

## 2023-02-11 RX ADMIN — MEDROXYPROGESTERONE ACETATE 10 MG: 5 TABLET ORAL at 08:07

## 2023-02-11 RX ADMIN — FERROUS SULFATE TAB 325 MG (65 MG ELEMENTAL FE) 325 MG: 325 (65 FE) TAB at 08:13

## 2023-02-11 RX ADMIN — BREXPIPRAZOLE 2 MG: 1 TABLET ORAL at 00:31

## 2023-02-11 RX ADMIN — HYDROXYCHLOROQUINE SULFATE 400 MG: 200 TABLET ORAL at 08:09

## 2023-02-11 RX ADMIN — HYDROMORPHONE HYDROCHLORIDE 0.3 MG: 1 INJECTION, SOLUTION INTRAMUSCULAR; INTRAVENOUS; SUBCUTANEOUS at 03:57

## 2023-02-11 RX ADMIN — PREGABALIN 100 MG: 100 CAPSULE ORAL at 08:12

## 2023-02-11 RX ADMIN — FUROSEMIDE 20 MG: 20 TABLET ORAL at 08:13

## 2023-02-11 RX ADMIN — POLYETHYLENE GLYCOL 3350, SODIUM SULFATE ANHYDROUS, SODIUM BICARBONATE, SODIUM CHLORIDE, POTASSIUM CHLORIDE 4000 ML: 236; 22.74; 6.74; 5.86; 2.97 POWDER, FOR SOLUTION ORAL at 01:06

## 2023-02-11 RX ADMIN — SODIUM CHLORIDE: 9 INJECTION, SOLUTION INTRAVENOUS at 08:02

## 2023-02-11 RX ADMIN — HYDROMORPHONE HYDROCHLORIDE 0.2 MG: 0.2 INJECTION, SOLUTION INTRAMUSCULAR; INTRAVENOUS; SUBCUTANEOUS at 00:31

## 2023-02-11 RX ADMIN — BUSPIRONE HYDROCHLORIDE 20 MG: 10 TABLET ORAL at 08:08

## 2023-02-11 ASSESSMENT — ACTIVITIES OF DAILY LIVING (ADL)
ADLS_ACUITY_SCORE: 40

## 2023-02-11 NOTE — H&P
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 rattooth forcep;  Surgeon: Soham Cano MD;  Location: [facility]     SIGMOIDOSCOPY FLEXIBLE N/A 2/20/2019    Procedure: Exam under anesthesia Colonoscopy with attempted  removal of rectal foreign body;  Surgeon: Estrada Chávez MD;  Location:  OR        Family History      Family History    Problem Relation Age of Onset     Diabetes Type 2  Maternal Grandmother      Diabetes Type 2  Paternal Grandmother      Breast Cancer Paternal Grandmother      Cerebrovascular Disease Father 53     Myocardial Infarction No family hx of      Coronary Artery Disease Early Onset No family hx of      Ovarian Cancer No family hx of      Colon Cancer No family hx of      Depression Mother      Anxiety Disorder Mother          Social History      .  Social History     Socioeconomic History     Marital status: Single     Spouse name: Not on file     Number of children: Not on file     Years of education: Not on file     Highest education level: Not on file   Occupational History     Occupation: On disability   Tobacco Use     Smoking status: Never     Smokeless tobacco: Never   Vaping Use     Vaping Use: Not on file   Substance and Sexual Activity     Alcohol use: No     Alcohol/week: 0.0 standard drinks     Drug use: No     Sexual activity: Not Currently     Partners: Male     Birth control/protection: I.U.D.     Comment: IUD - Mirena - placed July, 2015   Other Topics Concern     Parent/sibling w/ CABG, MI or angioplasty before 65F 55M? Not Asked   Social History Narrative    Single.    Living in independent living portion of [employer] [facility].    On disability.    No regular exercise.      Social Determinants of Health     Financial Resource Strain: Not on file   Food Insecurity: Not on file   Transportation Needs: Not on file   Physical Activity: Not on file   Stress: Not on file   Social Connections: Not on file   Intimate Partner Violence: Not on file   Housing Stability: Not on file          Allergies        Allergies   Allergen Reactions     Amoxicillin-Pot Clavulanate Other (See Comments), Swelling and Rash     PN: facial swelling, left side. Also had cortisone injection the same day as she started the Augmentin.  Noted in downtime recovery of chart.    PN: facial swelling, left side. Also had cortisone  injection the same day as she started the Augmentin.; HUT Comment: PN: facial swelling, left side. Also had cortisone injection the same day as she started the Augmentin.Noted in downtime recovery of chart.; HUT Reaction: Rash; HUT Reaction: Unknown; HUT Reaction Type: Allergy; HUT Severity: Med; HUT Noted: [id number]     Hydrocodone-Acetaminophen Nausea and Vomiting and Rash     Update on 12/12  Pt says she can take tylenol just not the hydrocodone.   Other reaction(s): Rash       Latex Rash     HUT Reaction: Rash; HUT Reaction Type: Allergy; HUT Severity: Low; HUT Noted: [id number]  Other reaction(s): Rash       Oseltamivir Hives     med stopped, PN: med stopped  med stopped, PN: med stopped; HUT Comment: med stopped, PN: med stopped; HUT Reaction: Hives; HUT Reaction Type: Allergy; HUT Severity: Med; HUT Noted: [id number]     Penicillins Anaphylaxis     HUT Reaction: Anaphylaxis; HUT Reaction Type: Allergy; HUT Severity: High; HUT Noted: [id number]     Vancomycin Itching, Swelling and Rash     Other reaction(s): Redness  Flushed face, very itchy; HUT Comment: Flushed face, very itchy; HUT Reaction: Angioedema; HUT Reaction: Redness; HUT Severity: Med; HUT Noted: [id number]    facial     Hydrocodone Nausea and Vomiting and GI Disturbance     vomiting for days, PN: vomiting for days; HUT Comment: vomiting for days; HUT Reaction: Gastrointestinal; HUT Reaction: Nausea And Vomiting; HUT Reaction Type: Intolerance; HUT Severity: Med; HUT Noted: [id number]  vomiting for days       Blood-Group Specific Substance Other (See Comments)     Patient has an anti-Cw and non-specific antibodies. Blood product orders may be delayed. Draw one red top and two purple top tubes for all type/screen/crossmatch orders.  Patient has anti-Cw, anti-K (Tad), Warm auto and nonspecific antibodies. Blood products may be delayed. Draw patient 24 hours prior to transfusion. Draw one red top and two purple top tubes for all type and screen orders.      Clavulanic Acid Angioedema     Fentanyl Itching     Naltrexone Other (See Comments)     Reaction(s): Vivid dreams.     Other Drug Allergy (See Comments)      See original file MRN [id number]. Files are marked for merge     Oxycodone Swelling     Adhesive Tape Rash     Silicone type     Band-Aid Anti-Itch      Other reaction(s): adhesive allergy     Cephalosporins Rash     Lamotrigine Rash     Possibly associated with Lamictal.   HUT Comment: Possibly associated with Lamictal. ; HUT Reaction: Rash; HUT Reaction Type: Allergy; HUT Severity: Low; HUT Noted: [id number]         Prior to Admission Medications      No current facility-administered medications on file prior to encounter.  albuterol (PROAIR HFA/PROVENTIL HFA/VENTOLIN HFA) 108 (90 Base) MCG/ACT inhaler, Inhale 2 puffs into the lungs every 6 hours as needed for shortness of breath / dyspnea or wheezing  albuterol (PROVENTIL) (2.5 MG/3ML) 0.083% neb solution, Take 2.5 mg by nebulization every 6 hours as needed for shortness of breath / dyspnea or wheezing  alum & mag hydroxide-simethicone (MAALOX MAX) 400-400-40 MG/5ML SUSP suspension, Take 15 mLs by mouth every 6 hours as needed for heartburn or other (sore throat)  BANOPHEN 2-0.1 % external cream, Apply 1 applicator topically 2 times daily as needed for itching  brexpiprazole (REXULTI) 2 MG tablet, Take 2 mg by mouth every evening  busPIRone (BUSPAR) 10 MG tablet, Take 2 tablets (20 mg) by mouth 3 times daily (Patient taking differently: Take 20 mg by mouth 2 times daily)  cetirizine (ZYRTEC) 10 MG tablet, Take 1 tablet (10 mg) by mouth daily (Patient taking differently: Take 10 mg by mouth every evening)  Cholecalciferol (D3 HIGH POTENCY) 25 MCG (1000 UT) CAPS, Take 50 mcg by mouth daily  cholestyramine (QUESTRAN) 4 g packet, Take 1 packet (4 g) by mouth 3 times daily (with meals)  cholestyramine (QUESTRAN) 4 g packet, Take 1 packet (4 g) by mouth 3 times daily (with meals)  desvenlafaxine (PRISTIQ) 100 MG 24  hr tablet, Take 1 tablet (100 mg) by mouth daily  ferrous sulfate (FEROSUL) 325 (65 Fe) MG tablet, Take 1 tablet (325 mg) by mouth daily (with breakfast)  fluocinonide (LIDEX) 0.05 % external cream, Apply 1 Application topically See Admin Instructions Apply thinly to knee 2 times daily, Monday through Fridays, off on weekends as needed. Avoid face and skin folds.  furosemide (LASIX) 20 MG tablet, Take 20 mg by mouth daily  hydroxychloroquine (PLAQUENIL) 200 MG tablet, Take 1 tablet (200 mg) by mouth 2 times daily (Patient taking differently: Take 400 mg by mouth daily)  ibuprofen (ADVIL/MOTRIN) 600 MG tablet, Take 1 tablet (600 mg) by mouth every 8 hours as needed for moderate pain  meclizine (ANTIVERT) 25 MG tablet, Take 1 tablet (25 mg) by mouth every 6 hours as needed for dizziness  medroxyPROGESTERone (PROVERA) 10 MG tablet, Take 1 tablet (10 mg) by mouth daily  metFORMIN (GLUCOPHAGE XR) 500 MG 24 hr tablet, Take 1,000 mg by mouth daily (with dinner) Take at 5 pm.  methocarbamol (ROBAXIN) 500 MG tablet, Take 1 tablet (500 mg) by mouth 4 times daily as needed  montelukast (SINGULAIR) 10 MG tablet, Take 10 mg by mouth every evening  OLANZapine (ZYPREXA) 2.5 MG tablet, Take 1.25 mg by mouth daily (with dinner) At 5pm  omeprazole (PRILOSEC) 40 MG DR capsule, Take 1 capsule (40 mg) by mouth daily  omeprazole (PRILOSEC) 40 MG DR capsule, Take 1 capsule (40 mg) by mouth daily  ondansetron (ZOFRAN-ODT) 4 MG ODT tab, Take 1 tablet (4 mg) by mouth every 8 hours as needed for nausea  pregabalin (LYRICA) 100 MG capsule, Take 1 capsule (100 mg) by mouth 3 times daily  Respiratory Therapy Supplies (NEBULIZER) BRENDAN, Nebulizer device.  Albuterol nebulization every 2 hours as needed for shortness of breath or wheezing.  Semaglutide 3 MG TABS, Take 3 mg by mouth daily before breakfast Then 7mg daily for second month. Then 14 mg daily  SUMAtriptan (IMITREX) 25 MG tablet, Take 25 mg by mouth at onset of headache for migraine May  repeat in 2 hours.  valACYclovir (VALTREX) 1000 mg tablet, Take 2 tablets by mouth two times daily for one day. Use as needed at onset of cold sore.             Review of Systems     A 12 point comprehensive review of systems was negative except as noted above in HPI.    PHYSICAL EXAMINATION       Vitals      Vitals: BP (!) 153/93 (BP Location: Right arm)   Pulse 98   Temp 98.2  F (36.8  C) (Oral)   Resp 16   SpO2 95%   BMI= There is no height or weight on file to calculate BMI.      Examination     General Appearance:  Alert, cooperative, no distress  Head:    Normocephalic, without obvious abnormality, atraumatic  EENT:  PERRL, conjunctiva/corneas clear, EOM's intact.   Neck:   Supple, symmetrical, trachea midline, no adenopathy; no NVE  Back:  Symmetric, no curvature, no CVA tenderness  Chest/Lungs: Clear to auscultation bilaterally, respirations unlabored, No tenderness or deformity. No abdominal breathing or use of accessory muscles.   Heart:    Regular rate and rhythm, S1 and S2 normal, no murmur, rub   or gallop  Abdomen: Soft, non-tender, bowel sounds active all four quadrants, not peritoneal on palpation. Not distended  Extremities:  Extremities normal, atraumatic, no swelling   Skin:  Skin color, texture, turgor normal, no rashes or lesion  Neurologic:  Awake and alert, No lateralizing or localizing signs.            Pertinent Lab     Results for orders placed or performed during the hospital encounter of 02/10/23   Abdomen XR, 2 vw, flat and upright    Impression    IMPRESSION:     Metallic radiopacity corresponding to the reported handle of a razor projects over the pelvis.    No pneumatosis or free intraperitoneal air. No evidence of bowel obstruction.     Scattered small pelvic phleboliths are unchanged.    Cholecystectomy clips.    Visualized lung bases are clear.   UA with Microscopic reflex to Culture    Specimen: Urine, Midstream   Result Value Ref Range    Color Urine Light Yellow Colorless,  Straw, Light Yellow, Yellow    Appearance Urine Clear Clear    Glucose Urine Negative Negative mg/dL    Bilirubin Urine Negative Negative    Ketones Urine Negative Negative mg/dL    Specific Gravity Urine 1.008 1.001 - 1.030    Blood Urine Negative Negative    pH Urine 6.0 5.0 - 7.0    Protein Albumin Urine 70 (A) Negative mg/dL    Urobilinogen Urine <2.0 <2.0 mg/dL    Nitrite Urine Negative Negative    Leukocyte Esterase Urine Negative Negative    Bacteria Urine Few (A) None Seen /HPF    Mucus Urine Present (A) None Seen /LPF    RBC Urine 0 <=2 /HPF    WBC Urine 1 <=5 /HPF    Squamous Epithelials Urine <1 <=1 /HPF           Pertinent Radiology

## 2023-02-11 NOTE — ED NOTES
Foreign object has been removed. Pt wondering about diet order and discharge planning. Will page team.

## 2023-02-11 NOTE — PROVIDER NOTIFICATION
Pagejorge a MELGAR Room ED-15  Patient refusing to take GoLYTELY d/t taste.Rx have advised not to mix the medication with Gatorade per patient's preference. Pt now requesting for an alternative laxative such as Magnesium Citrate. Any suggestion?  Thanks  #[id number]

## 2023-02-11 NOTE — OP NOTE
Operative Note    Name:  Nevin Alvarado  Location: * No surgery found *  Procedure Date:  * No surgery found *  PCP:  Latonya Knight    Surgery Performed:  Procedure/Surgery Information   Procedure: Rigid sigmoidoscopy followed by anoscopy with foreign body removal from the rectum    Surgeon(s):  Brice Richards MD    Specimen   there was a razor and a foreign body removed this was not sent for pathology            Pre-Procedure Diagnosis:  Foreign body insertion into the rectum    Post-Procedure Diagnosis:    *Foreign body stuck in the rectum    Anesthesia:  Anesthesia type cannot be found on the log.     Past Medical History:   Diagnosis Date     ADD (attention deficit disorder)      Anorexia nervosa with bulimia     history of; on Topamax     Anxiety      Asthma      Borderline personality disorder (H)      Depression      Eating disorder      H/O self-harm      h/o Suicide attempt 02/21/2018     History of pulmonary embolism 12/2019    Provoked. Completed 3 month course of Apixaban     Morbid obesity      Neuropathy      Obesity      PTSD (post-traumatic stress disorder)      Pulmonary emboli (H)      Rectal foreign body - Recurrent issue, self placed      Self-injurious behavior     hx swallowing nonfood items such as mickie pins     Sleep apnea     uses cpap     Suicidal overdose (H)      Swallowed foreign body - Recurrent issue, self placed      Syncope          Findings:  There was a razor handle approximately 5 inches long inch and a half wide no razor associated with the handle.  It was at the upper portion of the rectum hung up on one of the rectal valves    Operative Report:    Patient was in the emergency department in her room at her position on her hands and knees and did a digital rectal exam at the upper extent of the rectal exam I could feel the tip of the foreign body.  I then evaluated with a Sedgwick Rose instrument in the rectum but I was unable to visualize the foreign body.  I then  changed over to a rigid sigmoidoscope and as the sigmoidoscope was advanced about FCI in the length of the scope I could visualize the tip of the foreign body.  I attempted to grasp this through the scope but the visualization of the tip of the scope was poor and I was not confident about grasping tissue.  I then advanced a Arellano retractor into the rectum and used the rigid sigmoidoscope for light and to advance up past the rectal fold where I could see the tip of the foreign body I grasped it with an extra long Kocher and gently steered it out through the rectum and removed the foreign body.    Estimated Blood Loss:   Minimal       Drains:        Implants:  * No surgical log found *    Complications:    None    Brice Richards MD     Date: 2/11/2023  Time: 9:59 AM

## 2023-02-11 NOTE — PROVIDER NOTIFICATION
Isaac MELGAR Room ED-15  Patient c/o pain>7/10. 0.2 mg Dilaudid was administered at 22:12 Hrs with some relief. Pt states that she needs a higher dose as Tramadol and Tylenol are ineffective, hence refusing to take these. Kindly advise.   Thanks.  #[id number]    MD ordered 0.2 mg Dilaudid STAT

## 2023-02-11 NOTE — PROGRESS NOTES
"PRIMARY DIAGNOSIS: \"GENERIC\" NURSING  OUTPATIENT/OBSERVATION GOALS TO BE MET BEFORE DISCHARGE:  1. ADLs back to baseline: No    2. Activity and level of assistance: Up with standby assistance.    3. Pain status: Improved but still requiring IV narcotics.    4. Return to near baseline physical activity: No     Discharge Planner Nurse   Safe discharge environment identified: Yes  Barriers to discharge: Yes       Entered by: Stone Solo RN 02/11/2023 2:08 AM     Please review provider order for any additional goals.   Nurse to notify provider when observation goals have been met and patient is ready for discharge.  "

## 2023-02-11 NOTE — DISCHARGE SUMMARY
"[facility]  Hospitalist Discharge Summary      Date of Admission:  2/10/2023  Date of Discharge:  2/11/2023  Discharging Provider: Mark Roberts MD  Discharge Service: Hospitalist Service    Discharge Diagnoses   Foreign body in rectum with associated abdominal pain  Anxiety disorder  Borderline personality disorder  Depression  Type 2 diabetes  Generalized granuloma annulare  Asthma  Morbid obesity    Follow-ups Needed After Discharge   Assure appropriate psychiatry follow-up in place.    Unresulted Labs Ordered in the Past 30 Days of this Admission     No orders found for last 31 day(s).          Discharge Disposition   Discharged to home  Condition at discharge: Stable      Hospital Course   32-year-old female with an extensive psychiatric history just discharged from [facility] on 1/31 for similar episode presented to the hospital complaining of abdominal pain flank pain urinary frequency and concerns about a foreign body in her rectum.  In the ER x-ray revealed a razor handle within the rectum.  She was admitted.  General surgery saw the patient and was there was able to remove the foreign body at bedside in the ER.  They recommended discharge from the hospital.  The patient was requesting discharge from the hospital.  She was having no pain fevers rectal bleeding etc.  She was urinating without issues and no longer feeling any flank pain or urinary frequency.  Urinalysis performed here was unremarkable.  Patient will discharge to home on her prehospital medication regimen and follow-up with her primary within 1 week.  I would recommend outpatient psychiatric follow-up as well.    I specifically asked the patient about suicidal ideation, thoughts of self-harm, thoughts of harming others, hallucinations, agitation etc.  She denied all of these.  Patient states when asked specifically why she inserted the foreign body it was because \"I was bored\".    Consultations This " Hospital Stay   PSYCHIATRY IP CONSULT  SURGERY GENERAL IP CONSULT  CARE MANAGEMENT / SOCIAL WORK IP CONSULT    Code Status   Full Code    Time Spent on this Encounter   I, Mark Roberts MD, personally saw the patient today and spent greater than 30 minutes discharging this patient.       Mark Roberts MD  [facility] EMERGENCY ROOM  [address] [facility] [address]  Phone: [phone]  Fax: [phone]  ______________________________________________________________________    Physical Exam   Vital Signs: Temp: 98.2  F (36.8  C) Temp src: Oral BP: 127/64 Pulse: 103   Resp: 16 SpO2: 95 % O2 Device: None (Room air)    Weight: 0 lbs 0 oz  GENERAL:  Alert, appears comfortable, in no acute distress, appears stated age   HEAD:  Normocephalic, without obvious abnormality, atraumatic   NECK: Supple, symmetrical, trachea midline   BACK:   Symmetric, no curvature, ROM normal   LUNGS:   Clear to auscultation bilaterally, no rales, rhonchi, or wheezing, symmetric chest rise on inhalation, respirations unlabored   HEART:  Regular rate and rhythm, S1 and S2 normal, no murmur, rub, or gallop    ABDOMEN:   Soft, non-tender, bowel sounds active all four quadrants, no masses, no organomegaly, no rebound or guarding   EXTREMITIES: Extremities normal, atraumatic, no cyanosis or edema    SKIN: Dry to touch, no exanthems in the visualized areas   NEURO: Alert, oriented x 4, moves all four extremities freely/spontaneously   PSYCH: Cooperative, behavior is appropriate             Primary Care Physician   Latonya Knight    Discharge Orders      Reason for your hospital stay    Foreign body in rectum.     Follow-up and recommended labs and tests     Follow up with primary care provider, Latonya Knight, within 7 days for hospital follow- up.     Activity    Your activity upon discharge: activity as tolerated     Diet    Follow this diet upon discharge: Orders Placed This Encounter       Regular Diet Adult       Significant Results and Procedures   Results for orders placed or performed during the hospital encounter of 02/10/23   Abdomen XR, 2 vw, flat and upright    Narrative    EXAM: XR ABDOMEN 2 VIEWS  LOCATION: [facility]  DATE/TIME: 2/10/2023 7:35 PM    INDICATION: Possible rectal foreign body.  COMPARISON: Abdominal radiographs 01/31/2023.      Impression    IMPRESSION:     Metallic radiopacity corresponding to the reported handle of a razor projects over the pelvis.    No pneumatosis or free intraperitoneal air. No evidence of bowel obstruction.     Scattered small pelvic phleboliths are unchanged.    Cholecystectomy clips.    Visualized lung bases are clear.     *Note: Due to a large number of results and/or encounters for the requested time period, some results have not been displayed. A complete set of results can be found in Results Review.       Discharge Medications   Current Discharge Medication List      CONTINUE these medications which have NOT CHANGED    Details   albuterol (PROAIR HFA/PROVENTIL HFA/VENTOLIN HFA) 108 (90 Base) MCG/ACT inhaler Inhale 2 puffs into the lungs every 6 hours as needed for shortness of breath / dyspnea or wheezing  Qty: 18 g, Refills: 0    Comments: Pharmacy may dispense brand covered by insurance (Proair, or proventil or ventolin or generic albuterol inhaler)      albuterol (PROVENTIL) (2.5 MG/3ML) 0.083% neb solution Take 2.5 mg by nebulization every 6 hours as needed for shortness of breath / dyspnea or wheezing      alum & mag hydroxide-simethicone (MAALOX MAX) 400-400-40 MG/5ML SUSP suspension Take 15 mLs by mouth every 6 hours as needed for heartburn or other (sore throat)  Qty: 355 mL, Refills: 0      BANOPHEN 2-0.1 % external cream Apply 1 applicator topically 2 times daily as needed for itching      brexpiprazole (REXULTI) 2 MG tablet Take 2 mg by mouth every evening      busPIRone (BUSPAR) 10 MG tablet Take 2 tablets (20  mg) by mouth 3 times daily  Qty: 180 tablet, Refills: 0    Associated Diagnoses: Anxiety disorder, unspecified type      cetirizine (ZYRTEC) 10 MG tablet Take 1 tablet (10 mg) by mouth daily  Qty: 30 tablet, Refills: 0    Associated Diagnoses: Pica in adults; Gastroesophageal reflux disease without esophagitis      Cholecalciferol (D3 HIGH POTENCY) 25 MCG (1000 UT) CAPS Take 50 mcg by mouth daily      cholestyramine (QUESTRAN) 4 g packet Take 1 packet (4 g) by mouth 3 times daily (with meals)  Qty: 270 packet, Refills: 3    Associated Diagnoses: Diarrhea, unspecified type      desvenlafaxine (PRISTIQ) 100 MG 24 hr tablet Take 1 tablet (100 mg) by mouth daily  Qty: 30 tablet, Refills: 0    Associated Diagnoses: Major depressive disorder, recurrent episode, moderate (H)      ferrous sulfate (FEROSUL) 325 (65 Fe) MG tablet Take 1 tablet (325 mg) by mouth daily (with breakfast)  Qty: 30 tablet, Refills: 0    Associated Diagnoses: Red blood cell antibody positive      fluocinonide (LIDEX) 0.05 % external cream Apply 1 Application topically 2 times daily as needed Apply thinly to knee 2 times daily, Monday through Fridays, off on weekends as needed. Avoid face and skin folds.      furosemide (LASIX) 20 MG tablet Take 20 mg by mouth daily      hydroxychloroquine (PLAQUENIL) 200 MG tablet Take 1 tablet (200 mg) by mouth 2 times daily  Qty: 30 tablet, Refills: 0    Associated Diagnoses: Other specified health status      meclizine (ANTIVERT) 25 MG tablet Take 1 tablet (25 mg) by mouth every 6 hours as needed for dizziness  Qty: 30 tablet, Refills: 0      medroxyPROGESTERone (PROVERA) 10 MG tablet Take 1 tablet (10 mg) by mouth daily  Qty: 10 tablet, Refills: 0      metFORMIN (GLUCOPHAGE XR) 500 MG 24 hr tablet Take 1,000 mg by mouth daily (with breakfast)      montelukast (SINGULAIR) 10 MG tablet Take 10 mg by mouth every evening      OLANZapine (ZYPREXA) 2.5 MG tablet Take 1.25 mg by mouth daily (with dinner) At 5pm       omeprazole (PRILOSEC) 40 MG DR capsule Take 1 capsule (40 mg) by mouth daily  Qty: 90 capsule, Refills: 3    Associated Diagnoses: Gastroesophageal reflux disease, unspecified whether esophagitis present      ondansetron (ZOFRAN-ODT) 4 MG ODT tab Take 1 tablet (4 mg) by mouth every 8 hours as needed for nausea  Qty: 15 tablet, Refills: 0    Associated Diagnoses: Gastroesophageal reflux disease without esophagitis      pregabalin (LYRICA) 100 MG capsule Take 1 capsule (100 mg) by mouth 3 times daily  Qty: 90 capsule, Refills: 0    Associated Diagnoses: Anxiety; Polyneuropathy      Semaglutide 3 MG TABS Take 3 mg by mouth daily before breakfast Then 7mg daily for second month. Then 14 mg daily      SUMAtriptan (IMITREX) 25 MG tablet Take 25 mg by mouth at onset of headache for migraine May repeat in 2 hours.      valACYclovir (VALTREX) 1000 mg tablet Take 2,000 mg by mouth 2 times daily as needed Take 2 tablets by mouth two times daily for one day. Use as needed at onset of cold sore.      ibuprofen (ADVIL/MOTRIN) 600 MG tablet Take 1 tablet (600 mg) by mouth every 8 hours as needed for moderate pain  Qty: 24 tablet, Refills: 0      Respiratory Therapy Supplies (NEBULIZER) BRENDAN Nebulizer device.  Albuterol nebulization every 2 hours as needed for shortness of breath or wheezing.  Qty: 1 each, Refills: 0    Comments: Include tubing and mask for age please.           Allergies   Allergies   Allergen Reactions     Amoxicillin-Pot Clavulanate Other (See Comments), Swelling and Rash     PN: facial swelling, left side. Also had cortisone injection the same day as she started the Augmentin.  Noted in downtime recovery of chart.    PN: facial swelling, left side. Also had cortisone injection the same day as she started the Augmentin.; HUT Comment: PN: facial swelling, left side. Also had cortisone injection the same day as she started the Augmentin.Noted in downtime recovery of chart.; HUT Reaction: Rash; HUT Reaction:  Unknown; HUT Reaction Type: Allergy; HUT Severity: Med; HUT Noted: [id number]     Hydrocodone-Acetaminophen Nausea and Vomiting and Rash     Update on 12/12  Pt says she can take tylenol just not the hydrocodone.   Other reaction(s): Rash       Latex Rash     HUT Reaction: Rash; HUT Reaction Type: Allergy; HUT Severity: Low; HUT Noted: [id number]  Other reaction(s): Rash       Oseltamivir Hives     med stopped, PN: med stopped  med stopped, PN: med stopped; HUT Comment: med stopped, PN: med stopped; HUT Reaction: Hives; HUT Reaction Type: Allergy; HUT Severity: Med; HUT Noted: [id number]     Penicillins Anaphylaxis     HUT Reaction: Anaphylaxis; HUT Reaction Type: Allergy; HUT Severity: High; HUT Noted: [id number]     Vancomycin Itching, Swelling and Rash     Other reaction(s): Redness  Flushed face, very itchy; HUT Comment: Flushed face, very itchy; HUT Reaction: Angioedema; HUT Reaction: Redness; HUT Severity: Med; HUT Noted: [id number]    facial     Hydrocodone Nausea and Vomiting and GI Disturbance     vomiting for days, PN: vomiting for days; HUT Comment: vomiting for days; HUT Reaction: Gastrointestinal; HUT Reaction: Nausea And Vomiting; HUT Reaction Type: Intolerance; HUT Severity: Med; HUT Noted: [id number]  vomiting for days       Blood-Group Specific Substance Other (See Comments)     Patient has an anti-Cw and non-specific antibodies. Blood product orders may be delayed. Draw one red top and two purple top tubes for all type/screen/crossmatch orders.  Patient has anti-Cw, anti-K (Angella), Warm auto and nonspecific antibodies. Blood products may be delayed. Draw patient 24 hours prior to transfusion. Draw one red top and two purple top tubes for all type and screen orders.     Clavulanic Acid Angioedema     Fentanyl Itching     Naltrexone Other (See Comments)     Reaction(s): Vivid dreams.     Other Drug Allergy (See Comments)      See original file MRN [id number]. Files are marked for merge     Oxycodone Swelling      Adhesive Tape Rash     Silicone type     Band-Aid Anti-Itch      Other reaction(s): adhesive allergy     Cephalosporins Rash     Lamotrigine Rash     Possibly associated with Lamictal.   HUT Comment: Possibly associated with Lamictal. ; HUT Reaction: Rash; HUT Reaction Type: Allergy; HUT Severity: Low; HUT Noted: [id number]

## 2023-02-11 NOTE — PHARMACY-ADMISSION MEDICATION HISTORY
Pharmacy Note - Admission Medication History    Pertinent Provider Information: N/A     ______________________________________________________________________    Prior To Admission (PTA) med list completed and updated in EMR.       PTA Med List   Medication Sig Last Dose     albuterol (PROAIR HFA/PROVENTIL HFA/VENTOLIN HFA) 108 (90 Base) MCG/ACT inhaler Inhale 2 puffs into the lungs every 6 hours as needed for shortness of breath / dyspnea or wheezing Unknown     albuterol (PROVENTIL) (2.5 MG/3ML) 0.083% neb solution Take 2.5 mg by nebulization every 6 hours as needed for shortness of breath / dyspnea or wheezing Unknown     alum & mag hydroxide-simethicone (MAALOX MAX) 400-400-40 MG/5ML SUSP suspension Take 15 mLs by mouth every 6 hours as needed for heartburn or other (sore throat) Unknown     BANOPHEN 2-0.1 % external cream Apply 1 applicator topically 2 times daily as needed for itching Unknown     brexpiprazole (REXULTI) 2 MG tablet Take 2 mg by mouth every evening 2/9/2023 at PM     busPIRone (BUSPAR) 10 MG tablet Take 2 tablets (20 mg) by mouth 3 times daily (Patient taking differently: Take 20 mg by mouth 2 times daily) 2/10/2023 at AM     cetirizine (ZYRTEC) 10 MG tablet Take 1 tablet (10 mg) by mouth daily (Patient taking differently: Take 10 mg by mouth every evening) 2/9/2023 at PM     Cholecalciferol (D3 HIGH POTENCY) 25 MCG (1000 UT) CAPS Take 50 mcg by mouth daily 2/10/2023 at AM     cholestyramine (QUESTRAN) 4 g packet Take 1 packet (4 g) by mouth 3 times daily (with meals) 2/10/2023     desvenlafaxine (PRISTIQ) 100 MG 24 hr tablet Take 1 tablet (100 mg) by mouth daily 2/10/2023 at AM     ferrous sulfate (FEROSUL) 325 (65 Fe) MG tablet Take 1 tablet (325 mg) by mouth daily (with breakfast) 2/10/2023 at AM     fluocinonide (LIDEX) 0.05 % external cream Apply 1 Application topically 2 times daily as needed Apply thinly to knee 2 times daily, Monday through Fridays, off on weekends as needed. Avoid face  and skin folds. Unknown     furosemide (LASIX) 20 MG tablet Take 20 mg by mouth daily 2/10/2023 at AM     hydroxychloroquine (PLAQUENIL) 200 MG tablet Take 1 tablet (200 mg) by mouth 2 times daily (Patient taking differently: Take 400 mg by mouth daily) 2/10/2023     meclizine (ANTIVERT) 25 MG tablet Take 1 tablet (25 mg) by mouth every 6 hours as needed for dizziness Unknown     medroxyPROGESTERone (PROVERA) 10 MG tablet Take 1 tablet (10 mg) by mouth daily 2/10/2023     metFORMIN (GLUCOPHAGE XR) 500 MG 24 hr tablet Take 1,000 mg by mouth daily (with breakfast) 2/10/2023 at AM     montelukast (SINGULAIR) 10 MG tablet Take 10 mg by mouth every evening 2/9/2023 at PM     OLANZapine (ZYPREXA) 2.5 MG tablet Take 1.25 mg by mouth daily (with dinner) At 5pm 2/10/2023 at 1700     omeprazole (PRILOSEC) 40 MG DR capsule Take 1 capsule (40 mg) by mouth daily 2/10/2023     ondansetron (ZOFRAN-ODT) 4 MG ODT tab Take 1 tablet (4 mg) by mouth every 8 hours as needed for nausea Unknown     pregabalin (LYRICA) 100 MG capsule Take 1 capsule (100 mg) by mouth 3 times daily 2/10/2023 at x2     Semaglutide 3 MG TABS Take 3 mg by mouth daily before breakfast Then 7mg daily for second month. Then 14 mg daily 2/10/2023 at AM     SUMAtriptan (IMITREX) 25 MG tablet Take 25 mg by mouth at onset of headache for migraine May repeat in 2 hours. Unknown     valACYclovir (VALTREX) 1000 mg tablet Take 2,000 mg by mouth 2 times daily as needed Take 2 tablets by mouth two times daily for one day. Use as needed at onset of cold sore. Unknown       Information source(s): Patient and CareEverywhere/SureScripts  Method of interview communication: in-person    Summary of Changes to PTA Med List  New: N/A  Discontinued: Robaxin, ibuprofen  Changed: Lidex prn    Patient was asked about OTC/herbal products specifically.  PTA med list reflects this.    In the past week, patient estimated taking medication this percent of the time:  greater than  90%.    Medication Affordability:  Not including over the counter (OTC) medications, was there a time in the past 12 months when you did not take your medications as prescribed because of cost?: No    Allergies were reviewed, assessed, and updated with the patient.      Patient did not bring any medications to the hospital and can't retrieve from home. No multi-dose medications are available for use during hospital stay.     The information provided in this note is only as accurate as the sources available at the time of the update(s).    Thank you for the opportunity to participate in the care of this patient.    Red Mckeon [facility]  2/10/2023 9:58 PM

## 2023-02-11 NOTE — CONSULTS
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       Oseltamivir Hives     med stopped, PN: med stopped  med stopped, PN: med stopped; HUT Comment: med stopped, PN: med stopped; HUT Reaction: Hives; HUT Reaction Type: Allergy; HUT Severity: Med; HUT Noted: [id number]     Penicillins Anaphylaxis     HUT Reaction: Anaphylaxis; HUT Reaction Type: Allergy; HUT  Severity: High; HUT Noted: [id number]     Vancomycin Itching, Swelling and Rash     Other reaction(s): Redness  Flushed face, very itchy; HUT Comment: Flushed face, very itchy; HUT Reaction: Angioedema; HUT Reaction: Redness; HUT Severity: Med; HUT Noted: [id number]    facial     Hydrocodone Nausea and Vomiting and GI Disturbance     vomiting for days, PN: vomiting for days; HUT Comment: vomiting for days; HUT Reaction: Gastrointestinal; HUT Reaction: Nausea And Vomiting; HUT Reaction Type: Intolerance; HUT Severity: Med; HUT Noted: [id number]  vomiting for days       Blood-Group Specific Substance Other (See Comments)     Patient has an anti-Cw and non-specific antibodies. Blood product orders may be delayed. Draw one red top and two purple top tubes for all type/screen/crossmatch orders.  Patient has anti-Cw, anti-K ([place]), Warm auto and nonspecific antibodies. Blood products may be delayed. Draw patient 24 hours prior to transfusion. Draw one red top and two purple top tubes for all type and screen orders.     Clavulanic Acid Angioedema     Fentanyl Itching     Naltrexone Other (See Comments)     Reaction(s): Vivid dreams.     Other Drug Allergy (See Comments)      See original file MRN [id number]. Files are marked for merge     Oxycodone Swelling     Adhesive Tape Rash     Silicone type     Band-Aid Anti-Itch      Other reaction(s): adhesive allergy     Cephalosporins Rash     Lamotrigine Rash     Possibly associated with Lamictal.   HUT Comment: Possibly associated with Lamictal. ; HUT Reaction: Rash; HUT Reaction Type: Allergy; HUT Severity: Low; HUT Noted: [id number]          FAMILY HISTORY     Family History   Problem Relation Age of Onset     Diabetes Type 2  Maternal Grandmother      Diabetes Type 2  Paternal Grandmother      Breast Cancer Paternal Grandmother      Cerebrovascular Disease Father 53     Myocardial Infarction No family hx of      Coronary Artery Disease Early Onset No family hx of      Ovarian  Cancer No family hx of      Colon Cancer No family hx of      Depression Mother      Anxiety Disorder Mother          SOCIAL HISTORY     Social History     Socioeconomic History     Marital status: Single     Spouse name: None     Number of children: None     Years of education: None     Highest education level: None   Occupational History     Occupation: On disability   Tobacco Use     Smoking status: Never     Smokeless tobacco: Never   Substance and Sexual Activity     Alcohol use: No     Alcohol/week: 0.0 standard drinks     Drug use: No     Sexual activity: Not Currently     Partners: Male     Birth control/protection: I.U.D.     Comment: IUD - Mirena - placed July, 2015   Social History Narrative    Single.    Living in independent living portion of [facility].    On disability.    No regular exercise.          REVIEW OF SYSTEMS       10 point review of systems are unremarkable except for the symptoms described in the HPI    PHYSICAL EXAM     VITAL SIGNS: /64 (BP Location: Left arm)   Pulse 103   Temp 98.2  F (36.8  C) (Oral)   Resp 16   SpO2 95%    General : Alert, cooperative, appears stated age, morbidly obese  Skin: Skin color, texture, turgor normal, no rashes or lesions   Lymph nodes: No obvious adenopathy   Head: Normocephalic, without obvious abnormality, atraumatic   HEENT:  conjunctiva/corneas clear, EOM's intact, no scleral icterus   Throat: Lips, mucosa, and tongue normal; teeth and gums normal    Neck: Supple, thyroid not enlarged   Back: No CVA tenderness   Lungs: Clear to auscultation bilaterally, respirations unlabored, no rales, rhonchi or wheezes   Chest Wall:No tenderness or deformity   Heart: Regular rate and rhythm, S1, S2 normal, no murmur, rub or gallop   Abdomen: Soft, non-tender, no guarding, + bowel sounds active,   RECTAL: At the most upper extent of the rectal exam I could feel a foreign body.  Extremities : No obvious swelling,  Neurologic: Cranial Nerves II-XII  normal, moves all extremities equally, no focal findings          RADIOLOGY      Abdomen XR, 2 vw, flat and upright   Final Result   IMPRESSION:       Metallic radiopacity corresponding to the reported handle of a razor projects over the pelvis.      No pneumatosis or free intraperitoneal air. No evidence of bowel obstruction.       Scattered small pelvic phleboliths are unchanged.      Cholecystectomy clips.      Visualized lung bases are clear.          EKG     Reviewed        LABS     Results for orders placed or performed during the hospital encounter of 02/10/23   Abdomen XR, 2 vw, flat and upright    Impression    IMPRESSION:     Metallic radiopacity corresponding to the reported handle of a razor projects over the pelvis.    No pneumatosis or free intraperitoneal air. No evidence of bowel obstruction.     Scattered small pelvic phleboliths are unchanged.    Cholecystectomy clips.    Visualized lung bases are clear.   UA with Microscopic reflex to Culture    Specimen: Urine, Midstream   Result Value Ref Range    Color Urine Light Yellow Colorless, Straw, Light Yellow, Yellow    Appearance Urine Clear Clear    Glucose Urine Negative Negative mg/dL    Bilirubin Urine Negative Negative    Ketones Urine Negative Negative mg/dL    Specific Gravity Urine 1.008 1.001 - 1.030    Blood Urine Negative Negative    pH Urine 6.0 5.0 - 7.0    Protein Albumin Urine 70 (A) Negative mg/dL    Urobilinogen Urine <2.0 <2.0 mg/dL    Nitrite Urine Negative Negative    Leukocyte Esterase Urine Negative Negative    Bacteria Urine Few (A) None Seen /HPF    Mucus Urine Present (A) None Seen /LPF    RBC Urine 0 <=2 /HPF    WBC Urine 1 <=5 /HPF    Squamous Epithelials Urine <1 <=1 /HPF   Comprehensive metabolic panel   Result Value Ref Range    Sodium 140 136 - 145 mmol/L    Potassium 3.8 3.5 - 5.0 mmol/L    Chloride 107 98 - 107 mmol/L    Carbon Dioxide (CO2) 27 22 - 31 mmol/L    Anion Gap 6 5 - 18 mmol/L    Urea Nitrogen 8 8 - 22 mg/dL     Creatinine 0.63 0.60 - 1.10 mg/dL    Calcium 9.5 8.5 - 10.5 mg/dL    Glucose 96 70 - 125 mg/dL    Alkaline Phosphatase 80 45 - 120 U/L    AST 38 0 - 40 U/L    ALT 69 (H) 0 - 45 U/L    Protein Total 6.8 6.0 - 8.0 g/dL    Albumin 3.8 3.5 - 5.0 g/dL    Bilirubin Total 0.4 0.0 - 1.0 mg/dL    GFR Estimate >90 >60 mL/min/1.73m2   HCG qualitative   Result Value Ref Range    hCG Serum Qualitative Negative Negative   CBC with platelets and differential   Result Value Ref Range    WBC Count 9.1 4.0 - 11.0 10e3/uL    RBC Count 4.51 3.80 - 5.20 10e6/uL    Hemoglobin 12.8 11.7 - 15.7 g/dL    Hematocrit 38.4 35.0 - 47.0 %    MCV 85 78 - 100 fL    MCH 28.4 26.5 - 33.0 pg    MCHC 33.3 31.5 - 36.5 g/dL    RDW 13.9 10.0 - 15.0 %    Platelet Count 271 150 - 450 10e3/uL    % Neutrophils 70 %    % Lymphocytes 21 %    % Monocytes 7 %    % Eosinophils 2 %    % Basophils 0 %    % Immature Granulocytes 0 %    NRBCs per 100 WBC 0 <1 /100    Absolute Neutrophils 6.3 1.6 - 8.3 10e3/uL    Absolute Lymphocytes 2.0 0.8 - 5.3 10e3/uL    Absolute Monocytes 0.6 0.0 - 1.3 10e3/uL    Absolute Eosinophils 0.2 0.0 - 0.7 10e3/uL    Absolute Basophils 0.0 0.0 - 0.2 10e3/uL    Absolute Immature Granulocytes 0.0 <=0.4 10e3/uL    Absolute NRBCs 0.0 10e3/uL   Result Value Ref Range    Lipase 22 0 - 52 U/L   Result Value Ref Range    Magnesium 1.8 1.8 - 2.6 mg/dL   Glucose by meter   Result Value Ref Range    GLUCOSE BY METER POCT 96 70 - 99 mg/dL           [facility]  [phone]

## 2023-02-11 NOTE — CONSULTS
"Care Management Initial Consult      General Information  Assessment completed with: Patient, Care Team Member, Pt and Guardian Janie.  Type of CM/SW Visit: Initial Assessment  Primary Care Provider verified and updated as needed: Yes   Readmission within the last 30 days: no previous admission in last 30 days   Reason for Consult: discharge planning, health care directive, transportation  Advance Care Planning: Advance Care Planning Reviewed: no concerns identified        Communication Assessment  Patient's communication style: spoken language (English or Bilingual)        Cognitive  Cognitive/Neuro/Behavioral: WDL                      Living Environment:   People in home: other (see comments) (Other residents)     Current living Arrangements: house      Able to return to prior arrangements: yes     Family/Social Support:  Care provided by: self, other (see comments) (Staff assist as needed.)  Provides care for: no one, unable/limited ability to care for self  Marital Status: Single  Guardian, Facility resident(s)/Staff          Description of Support System: Supportive, Involved    Support Assessment: Adequate family and caregiver support, Adequate social supports    Current Resources:   Patient receiving home care services: No  Community Resources: Group Home, Other (see comment) (\"Adult foster care\".)  Equipment currently used at home: walker, rolling  Supplies currently used at home: None    Employment/Financial:  Employment Status: other (see comments) (Undetermined)     Financial Concerns: No concerns identified   Referral to Financial Worker: No     Lifestyle & Psychosocial Needs:  Social Determinants of Health     Tobacco Use: Low Risk      Smoking Tobacco Use: Never     Smokeless Tobacco Use: Never     Passive Exposure: Not on file   Alcohol Use: Not on file   Financial Resource Strain: Not on file   Food Insecurity: Not on file   Transportation Needs: Not on file   Physical Activity: Not on file   Stress: " "Not on file   Social Connections: Not on file   Intimate Partner Violence: Not on file   Depression: Not at risk     PHQ-2 Score: 0   Housing Stability: Not on file     Functional Status:  Prior to admission patient needed assistance:   Dependent ADLs:: Independent  Dependent IADLs:: Transportation, Medication Management, Cooking, Shopping, Meal Preparation  Assessment of Functional Status: At functional baseline    Mental Health Status:  Mental Health Status: No Current Concerns       Chemical Dependency Status:  Chemical Dependency Status: No Current Concerns           Values/Beliefs:  Spiritual, Cultural Beliefs, Lutheran Practices, Values that affect care: no (None identified)       Cultural/Lutheran Practices Patient Routinely Participates In:  (None identified)         Care Management Discharge Note    Discharge Date: 02/11/2023     Discharge Disposition: Group Home  Discharge Services: None  Discharge DME: None  Discharge Transportation: agency  Education Provided on the Discharge Plan:  Yes  Persons Notified of Discharge Plans: Pt, legal guardian, and bedside RN  Patient/Family in Agreement with the Plan: yes  Handoff Referral Completed: Yes    Additional Information:  Writer met with pt to introduce Care Management(CM) and obtain an initial assessment. Pt reports living in an Adult foster home. House staff assist as needed. No concerns reported. Writer spoke to pt's Legal Guardian AaliyahArvin) by phone to confirm [facility] education and assessment information. Janie states pt has a history of foreign body ingestion/per rectum. Janei states that this is not an acute mental health crisis. No acute psychological concern. No current barriers to returning home at time of discharge.    02/11 per , P.-\"32 year old female presents to the Emergency Department for evaluation of flank pain and foreign body in rectum.  Reports putting a razor up her rectum just prior to arrival.  Also states she has had some " "general back pain and urinary symptoms for several days also.  Has been taking Tylenol without obvious improvement.  Review of records indicate patient with frequent evaluation/hospitalization/interventions due to ingestion of foreign bodies.  She is obese female in no distress.  Vital signs unremarkable other than slight hypertension.  No suprapubic tenderness.  Will obtain abdominal films to verify foreign body.  Urine analysis also being obtained given her reports of frequency.  Tramadol given for discomfort.  Patient appears non toxic with stable vitals signs. Overall exam is benign.\"     No further CM needs identified.    Kaiden Dumont RN        "

## 2023-02-11 NOTE — ED PROVIDER NOTES
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     SIGMOIDOSCOPY FLEXIBLE N/A 5/8/2018    Procedure: SIGMOIDOSCOPY FLEXIBLE;  flex sig with foreign body removal using snare and rattooth forcep;  Surgeon: Soham Cano MD;  Location: [facility]     SIGMOIDOSCOPY FLEXIBLE N/A 2/20/2019    Procedure: Exam under anesthesia Colonoscopy with attempted  removal of rectal foreign body;  Surgeon: Estrada Chávez MD;  Location: [facility]          CURRENT MEDICATIONS:    No current facility-administered medications for this encounter.    Current Outpatient Medications:      albuterol (PROAIR HFA/PROVENTIL HFA/VENTOLIN HFA) 108 (90 Base) MCG/ACT inhaler, Inhale 2 puffs into the lungs every 6 hours as needed for shortness of breath / dyspnea or wheezing, Disp: 18 g, Rfl: 0     albuterol (PROVENTIL) (2.5 MG/3ML) 0.083% neb solution, Take 2.5 mg by nebulization every 6 hours as needed for shortness of breath / dyspnea or wheezing, Disp: , Rfl:      alum & mag hydroxide-simethicone (MAALOX MAX) 400-400-40 MG/5ML SUSP suspension, Take 15 mLs by mouth every 6 hours as needed for heartburn or other (sore throat), Disp: 355 mL, Rfl: 0     BANOPHEN 2-0.1 % external cream, Apply 1 applicator topically 2 times daily as needed for itching, Disp: , Rfl:      brexpiprazole (REXULTI) 2 MG tablet, Take 2 mg by mouth every evening, Disp: , Rfl:      busPIRone (BUSPAR) 10 MG tablet, Take 2 tablets (20 mg) by mouth 3 times daily (Patient taking differently: Take 20 mg by mouth 2 times daily), Disp: 180 tablet, Rfl: 0     cetirizine (ZYRTEC) 10 MG tablet, Take 1 tablet (10 mg) by mouth daily (Patient taking differently: Take 10 mg by mouth every evening), Disp: 30 tablet, Rfl: 0     Cholecalciferol (D3 HIGH POTENCY) 25 MCG (1000 UT) CAPS, Take 50 mcg by mouth daily, Disp: , Rfl:      cholestyramine (QUESTRAN) 4 g packet, Take 1 packet (4 g) by mouth 3 times daily (with meals), Disp: 90 packet, Rfl: 0     cholestyramine (QUESTRAN) 4 g packet, Take 1 packet (4 g) by mouth 3 times daily (with meals), Disp: 270 packet, Rfl: 3     desvenlafaxine (PRISTIQ) 100 MG 24 hr tablet, Take 1 tablet (100 mg) by mouth daily, Disp: 30 tablet, Rfl: 0     ferrous sulfate (FEROSUL) 325 (65 Fe) MG tablet, Take 1 tablet (325 mg) by mouth daily (with breakfast), Disp: 30 tablet, Rfl: 0     fluocinonide (LIDEX) 0.05 % external cream, Apply 1 Application topically See Admin Instructions Apply  thinly to knee 2 times daily, Monday through Fridays, off on weekends as needed. Avoid face and skin folds., Disp: , Rfl:      furosemide (LASIX) 20 MG tablet, Take 20 mg by mouth daily, Disp: , Rfl:      hydroxychloroquine (PLAQUENIL) 200 MG tablet, Take 1 tablet (200 mg) by mouth 2 times daily (Patient taking differently: Take 400 mg by mouth daily), Disp: 30 tablet, Rfl: 0     ibuprofen (ADVIL/MOTRIN) 600 MG tablet, Take 1 tablet (600 mg) by mouth every 8 hours as needed for moderate pain, Disp: 24 tablet, Rfl: 0     meclizine (ANTIVERT) 25 MG tablet, Take 1 tablet (25 mg) by mouth every 6 hours as needed for dizziness, Disp: 30 tablet, Rfl: 0     medroxyPROGESTERone (PROVERA) 10 MG tablet, Take 1 tablet (10 mg) by mouth daily, Disp: 10 tablet, Rfl: 0     metFORMIN (GLUCOPHAGE XR) 500 MG 24 hr tablet, Take 1,000 mg by mouth daily (with dinner) Take at 5 pm., Disp: , Rfl:      methocarbamol (ROBAXIN) 500 MG tablet, Take 1 tablet (500 mg) by mouth 4 times daily as needed, Disp: 30 tablet, Rfl: 0     montelukast (SINGULAIR) 10 MG tablet, Take 10 mg by mouth every evening, Disp: , Rfl:      OLANZapine (ZYPREXA) 2.5 MG tablet, Take 1.25 mg by mouth daily (with dinner) At 5pm, Disp: , Rfl:      omeprazole (PRILOSEC) 40 MG DR capsule, Take 1 capsule (40 mg) by mouth daily, Disp: 30 capsule, Rfl: 0     omeprazole (PRILOSEC) 40 MG DR capsule, Take 1 capsule (40 mg) by mouth daily, Disp: 90 capsule, Rfl: 3     ondansetron (ZOFRAN-ODT) 4 MG ODT tab, Take 1 tablet (4 mg) by mouth every 8 hours as needed for nausea, Disp: 15 tablet, Rfl: 0     pregabalin (LYRICA) 100 MG capsule, Take 1 capsule (100 mg) by mouth 3 times daily, Disp: 90 capsule, Rfl: 0     Respiratory Therapy Supplies (NEBULIZER) BRENDAN, Nebulizer device.  Albuterol nebulization every 2 hours as needed for shortness of breath or wheezing., Disp: 1 each, Rfl: 0     Semaglutide 3 MG TABS, Take 3 mg by mouth daily before breakfast Then 7mg daily for second month.  Then 14 mg daily, Disp: , Rfl:      SUMAtriptan (IMITREX) 25 MG tablet, Take 25 mg by mouth at onset of headache for migraine May repeat in 2 hours., Disp: , Rfl:      valACYclovir (VALTREX) 1000 mg tablet, Take 2 tablets by mouth two times daily for one day. Use as needed at onset of cold sore. , Disp: , Rfl:     ALLERGIES:  Allergies   Allergen Reactions     Amoxicillin-Pot Clavulanate Other (See Comments), Swelling and Rash     PN: facial swelling, left side. Also had cortisone injection the same day as she started the Augmentin.  Noted in downtime recovery of chart.    PN: facial swelling, left side. Also had cortisone injection the same day as she started the Augmentin.; HUT Comment: PN: facial swelling, left side. Also had cortisone injection the same day as she started the Augmentin.Noted in downtime recovery of chart.; HUT Reaction: Rash; HUT Reaction: Unknown; HUT Reaction Type: Allergy; HUT Severity: Med; HUT Noted: [id number]     Hydrocodone-Acetaminophen Nausea and Vomiting and Rash     Update on 12/12  Pt says she can take tylenol just not the hydrocodone.   Other reaction(s): Rash       Latex Rash     HUT Reaction: Rash; HUT Reaction Type: Allergy; HUT Severity: Low; HUT Noted: [id number]  Other reaction(s): Rash       Oseltamivir Hives     med stopped, PN: med stopped  med stopped, PN: med stopped; HUT Comment: med stopped, PN: med stopped; HUT Reaction: Hives; HUT Reaction Type: Allergy; HUT Severity: Med; HUT Noted: [id number]     Penicillins Anaphylaxis     HUT Reaction: Anaphylaxis; HUT Reaction Type: Allergy; HUT Severity: High; HUT Noted: [id number]     Vancomycin Itching, Swelling and Rash     Other reaction(s): Redness  Flushed face, very itchy; HUT Comment: Flushed face, very itchy; HUT Reaction: Angioedema; HUT Reaction: Redness; HUT Severity: Med; HUT Noted: [id number]    facial     Hydrocodone Nausea and Vomiting and GI Disturbance     vomiting for days, PN: vomiting for days; HUT Comment: vomiting  for days; HUT Reaction: Gastrointestinal; HUT Reaction: Nausea And Vomiting; HUT Reaction Type: Intolerance; HUT Severity: Med; HUT Noted: [id number]  vomiting for days       Blood-Group Specific Substance Other (See Comments)     Patient has an anti-Cw and non-specific antibodies. Blood product orders may be delayed. Draw one red top and two purple top tubes for all type/screen/crossmatch orders.  Patient has anti-Cw, anti-K (Angella), Warm auto and nonspecific antibodies. Blood products may be delayed. Draw patient 24 hours prior to transfusion. Draw one red top and two purple top tubes for all type and screen orders.     Clavulanic Acid Angioedema     Fentanyl Itching     Naltrexone Other (See Comments)     Reaction(s): Vivid dreams.     Other Drug Allergy (See Comments)      See original file MRN [id number]. Files are marked for merge     Oxycodone Swelling     Adhesive Tape Rash     Silicone type     Band-Aid Anti-Itch      Other reaction(s): adhesive allergy     Cephalosporins Rash     Lamotrigine Rash     Possibly associated with Lamictal.   HUT Comment: Possibly associated with Lamictal. ; HUT Reaction: Rash; HUT Reaction Type: Allergy; HUT Severity: Low; HUT Noted: [id number]       FAMILY HISTORY:  Family History   Problem Relation Age of Onset     Diabetes Type 2  Maternal Grandmother      Diabetes Type 2  Paternal Grandmother      Breast Cancer Paternal Grandmother      Cerebrovascular Disease Father 53     Myocardial Infarction No family hx of      Coronary Artery Disease Early Onset No family hx of      Ovarian Cancer No family hx of      Colon Cancer No family hx of      Depression Mother      Anxiety Disorder Mother        SOCIAL HISTORY:   Social History     Socioeconomic History     Marital status: Single     Spouse name: None     Number of children: None     Years of education: None     Highest education level: None   Occupational History     Occupation: On disability   Tobacco Use     Smoking status:  Never     Smokeless tobacco: Never   Substance and Sexual Activity     Alcohol use: No     Alcohol/week: 0.0 standard drinks     Drug use: No     Sexual activity: Not Currently     Partners: Male     Birth control/protection: I.U.D.     Comment: IUD - Mirena - placed July, 2015   Social History Narrative    Single.    Living in independent living portion of [employer].    On disability.    No regular exercise.        VITALS:  Patient Vitals for the past 24 hrs:   BP Temp Temp src Pulse Resp SpO2   02/10/23 1830 (!) 167/102 98.8  F (37.1  C) Oral 100 18 99 %        PHYSICAL EXAM    Constitutional:  Awake, alert, in mild distress due to obese female  HENT:  Normocephalic, Atraumatic. Bilateral external ears normal. Oropharynx moist. Nose normal. Neck- Normal range of motion with no guarding, No midline cervical tenderness, Supple, No stridor.   Eyes:  PERRL, EOMI with no signs of entrapment, Conjunctiva normal, No discharge.   Respiratory:  Normal breath sounds, No respiratory distress, No wheezing.    Cardiovascular:  Normal heart rate, Normal rhythm, No appreciable rubs or gallops.   GI:  Soft, No tenderness, No distension, No palpable masses.  Rectal exam reveals no signs of injury.  Foreign body palpable at the tip of my finger  Musculoskeletal:   No edema. Good range of motion in all major joints. No tenderness to palpation or major deformities noted.  Integument:  Warm, Dry, No erythema, No rash.   Neurologic:  Alert & oriented, Normal motor function, Normal sensory function, No focal deficits noted.   Psychiatric:  Affect normal, Judgment normal, Mood normal.     LAB:  All pertinent labs reviewed and interpreted.  Results for orders placed or performed during the hospital encounter of 02/10/23   UA with Microscopic reflex to Culture    Specimen: Urine, Midstream   Result Value Ref Range    Color Urine Light Yellow Colorless, Straw, Light Yellow, Yellow    Appearance Urine Clear Clear    Glucose Urine  Negative Negative mg/dL    Bilirubin Urine Negative Negative    Ketones Urine Negative Negative mg/dL    Specific Gravity Urine 1.008 1.001 - 1.030    Blood Urine Negative Negative    pH Urine 6.0 5.0 - 7.0    Protein Albumin Urine 70 (A) Negative mg/dL    Urobilinogen Urine <2.0 <2.0 mg/dL    Nitrite Urine Negative Negative    Leukocyte Esterase Urine Negative Negative    Bacteria Urine Few (A) None Seen /HPF    Mucus Urine Present (A) None Seen /LPF    RBC Urine 0 <=2 /HPF    WBC Urine 1 <=5 /HPF    Squamous Epithelials Urine <1 <=1 /HPF       RADIOLOGY:  Reviewed all pertinent imaging. Please see official radiology report.  t    Result Date: 2/10/2023  EXAM: XR ABDOMEN 2 VIEWS LOCATION: [facility] DATE/TIME: 2/10/2023 7:35 PM INDICATION: Possible rectal foreign body. COMPARISON: Abdominal radiographs 01/31/2023.     IMPRESSION: Metallic radiopacity corresponding to the reported handle of a razor projects over the pelvis. No pneumatosis or free intraperitoneal air. No evidence of bowel obstruction. Scattered small pelvic phleboliths are unchanged. Cholecystectomy clips. Visualized lung bases are clear.        I, Timrosemarie Tate, am serving as a scribe to document services personally performed by Bird Valles MD, based on my observation and the provider's statements to me. I, Bird Valles MD attest that Terri Ybarra is acting in a scribe capacity, has observed my performance of the services and has documented them in accordance with my direction.    Bird Valles M.D.  Emergency Medicine  [facility] EMERGENCY ROOM     Bird Valles MD  02/10/23 2122

## 2023-02-11 NOTE — ED TRIAGE NOTES
Pt arrived via ems, a/ox4, no acute distress noted. Pt ambulated from stretcher to bed 15. Pt complains of mid back pain and dizziness. Pt also placed a handle of a razor in her rectum no actual razor blade attached. Pt given PO 4mg zofran by ems. Pt has bedside sitter per ED charge nurse, pt has h/x of eating items. Pt denies SI/HI.     Triage Assessment     Row Name 02/10/23 2994       Triage Assessment (Adult)    Airway WDL WDL       Respiratory WDL    Respiratory WDL WDL       Skin Circulation/Temperature WDL    Skin Circulation/Temperature WDL WDL       Cardiac WDL    Cardiac WDL WDL       Peripheral/Neurovascular WDL    Peripheral Neurovascular WDL WDL       Cognitive/Neuro/Behavioral WDL    Cognitive/Neuro/Behavioral WDL WDL

## 2023-02-11 NOTE — ED NOTES
Report given to Ysabel ZULUAGA. Questions address and answers. Receiving group home member, Ysabel, reports she will be present at group home upon EMS arrival.

## 2023-02-11 NOTE — PROGRESS NOTES
32-year-old patient with a foreign body in the rectum that is been removed from a general surgery standpoint this patient is ready for discharge when ready per primary team.    Brice Richards  [facility]  [phone]

## 2023-02-16 ENCOUNTER — APPOINTMENT (OUTPATIENT)
Dept: RADIOLOGY | Facility: CLINIC | Age: 33
End: 2023-02-16
Attending: STUDENT IN AN ORGANIZED HEALTH CARE EDUCATION/TRAINING PROGRAM
Payer: COMMERCIAL

## 2023-02-16 ENCOUNTER — HOSPITAL ENCOUNTER (EMERGENCY)
Facility: CLINIC | Age: 33
Discharge: HOME | End: 2023-02-16
Attending: STUDENT IN AN ORGANIZED HEALTH CARE EDUCATION/TRAINING PROGRAM | Admitting: STUDENT IN AN ORGANIZED HEALTH CARE EDUCATION/TRAINING PROGRAM
Payer: COMMERCIAL

## 2023-02-16 ENCOUNTER — HOSPITAL ENCOUNTER (OUTPATIENT)
Age: 33
End: 2023-02-16
Payer: COMMERCIAL

## 2023-02-16 VITALS
HEART RATE: 97 BPM | BODY MASS INDEX: 53.92 KG/M2 | WEIGHT: 293 LBS | TEMPERATURE: 99 F | OXYGEN SATURATION: 97 % | SYSTOLIC BLOOD PRESSURE: 135 MMHG | HEIGHT: 62 IN | DIASTOLIC BLOOD PRESSURE: 72 MMHG | RESPIRATION RATE: 18 BRPM

## 2023-02-16 DIAGNOSIS — T17.908A INHALED FOREIGN OBJECT, INITIAL ENCOUNTER: ICD-10-CM

## 2023-02-16 PROCEDURE — 71046 X-RAY EXAM CHEST 2 VIEWS: CPT

## 2023-02-16 PROCEDURE — 250N000013 HC RX MED GY IP 250 OP 250 PS 637: Performed by: STUDENT IN AN ORGANIZED HEALTH CARE EDUCATION/TRAINING PROGRAM

## 2023-02-16 PROCEDURE — 258N000003 HC RX IP 258 OP 636: Performed by: STUDENT IN AN ORGANIZED HEALTH CARE EDUCATION/TRAINING PROGRAM

## 2023-02-16 PROCEDURE — 70360 X-RAY EXAM OF NECK: CPT

## 2023-02-16 PROCEDURE — 99285 EMERGENCY DEPT VISIT HI MDM: CPT | Mod: 25

## 2023-02-16 PROCEDURE — 71046 X-RAY EXAM CHEST 2 VIEWS: CPT | Mod: 76

## 2023-02-16 PROCEDURE — 96361 HYDRATE IV INFUSION ADD-ON: CPT

## 2023-02-16 PROCEDURE — 96360 HYDRATION IV INFUSION INIT: CPT

## 2023-02-16 RX ORDER — ACETAMINOPHEN 325 MG/1
975 TABLET ORAL ONCE
Status: COMPLETED | OUTPATIENT
Start: 2023-02-16 | End: 2023-02-16

## 2023-02-16 RX ADMIN — ACETAMINOPHEN 975 MG: 325 TABLET ORAL at 20:42

## 2023-02-16 RX ADMIN — SODIUM CHLORIDE, POTASSIUM CHLORIDE, SODIUM LACTATE AND CALCIUM CHLORIDE 1000 ML: 600; 310; 30; 20 INJECTION, SOLUTION INTRAVENOUS at 20:46

## 2023-02-16 ASSESSMENT — ACTIVITIES OF DAILY LIVING (ADL)
ADLS_ACUITY_SCORE: 40

## 2023-02-16 NOTE — ED TRIAGE NOTES
"Patient presents to ED via EMS, pt comes from [facility] home where she has hx of swallowing foreign bodies, this afternoon she swallowed a 3 inch piece of wire from her sleep apnea mouthguard, now patient is c/o pain to throat and says \"it is hard to breathe\", patient is spitting up occasional spit, speaking in full sentences, has smooth even respirations.  Catherine Casillas RN.......2/16/2023 5:55 PM     Triage Assessment     Row Name 02/16/23 8316       Triage Assessment (Adult)    Airway WDL WDL       Respiratory WDL    Respiratory WDL WDL       Skin Circulation/Temperature WDL    Skin Circulation/Temperature WDL WDL       Cardiac WDL    Cardiac WDL WDL       Peripheral/Neurovascular WDL    Peripheral Neurovascular WDL WDL       Cognitive/Neuro/Behavioral WDL    Cognitive/Neuro/Behavioral WDL X;mood/behavior    Mood/Behavior flat affect              "

## 2023-02-16 NOTE — ED PROVIDER NOTES
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     Diabetes Type 2  Maternal Grandmother      Diabetes Type 2  Paternal Grandmother      Breast Cancer Paternal Grandmother      Cerebrovascular Disease Father 53     Myocardial Infarction No family hx of      Coronary Artery Disease Early Onset No family hx of      Ovarian Cancer No family hx of       Colon Cancer No family hx of      Depression Mother      Anxiety Disorder Mother       Social History     Tobacco Use     Smoking status: Never     Smokeless tobacco: Never   Substance Use Topics     Alcohol use: No     Alcohol/week: 0.0 standard drinks     Drug use: No     Allergies   Allergen Reactions     Amoxicillin-Pot Clavulanate Other (See Comments), Swelling and Rash     PN: facial swelling, left side. Also had cortisone injection the same day as she started the Augmentin.  Noted in downtime recovery of chart.    PN: facial swelling, left side. Also had cortisone injection the same day as she started the Augmentin.; HUT Comment: PN: facial swelling, left side. Also had cortisone injection the same day as she started the Augmentin.Noted in downtime recovery of chart.; HUT Reaction: Rash; HUT Reaction: Unknown; HUT Reaction Type: Allergy; HUT Severity: Med; HUT Noted: [id number]     Hydrocodone-Acetaminophen Nausea and Vomiting and Rash     Update on 12/12  Pt says she can take tylenol just not the hydrocodone.   Other reaction(s): Rash       Latex Rash     HUT Reaction: Rash; HUT Reaction Type: Allergy; HUT Severity: Low; HUT Noted: [id number]  Other reaction(s): Rash       Oseltamivir Hives     med stopped, PN: med stopped  med stopped, PN: med stopped; HUT Comment: med stopped, PN: med stopped; HUT Reaction: Hives; HUT Reaction Type: Allergy; HUT Severity: Med; HUT Noted: [id number]     Penicillins Anaphylaxis     HUT Reaction: Anaphylaxis; HUT Reaction Type: Allergy; HUT Severity: High; HUT Noted: [id number]     Vancomycin Itching, Swelling and Rash     Other reaction(s): Redness  Flushed face, very itchy; HUT Comment: Flushed face, very itchy; HUT Reaction: Angioedema; HUT Reaction: Redness; HUT Severity: Med; HUT Noted: [id number]    facial     Hydrocodone Nausea and Vomiting and GI Disturbance     vomiting for days, PN: vomiting for days; HUT Comment: vomiting for days; HUT Reaction: Gastrointestinal; HUT  Reaction: Nausea And Vomiting; HUT Reaction Type: Intolerance; HUT Severity: Med; HUT Noted: [id number]  vomiting for days       Blood-Group Specific Substance Other (See Comments)     Patient has an anti-Cw and non-specific antibodies. Blood product orders may be delayed. Draw one red top and two purple top tubes for all type/screen/crossmatch orders.  Patient has anti-Cw, anti-K (Angella), Warm auto and nonspecific antibodies. Blood products may be delayed. Draw patient 24 hours prior to transfusion. Draw one red top and two purple top tubes for all type and screen orders.     Clavulanic Acid Angioedema     Fentanyl Itching     Naltrexone Other (See Comments)     Reaction(s): Vivid dreams.     Other Drug Allergy (See Comments)      See original file MRN [id number]. Files are marked for merge     Oxycodone Swelling     Adhesive Tape Rash     Silicone type     Band-Aid Anti-Itch      Other reaction(s): adhesive allergy     Cephalosporins Rash     Lamotrigine Rash     Possibly associated with Lamictal.   HUT Comment: Possibly associated with Lamictal. ; HUT Reaction: Rash; HUT Reaction Type: Allergy; HUT Severity: Low; HUT Noted: [id number]       Relevant past medical, surgical, family and social history as documented above, has been reviewed and discussed with patient. No changes or additions, unless otherwise noted in the HPI.    Current Medications     albuterol (PROAIR HFA/PROVENTIL HFA/VENTOLIN HFA) 108 (90 Base) MCG/ACT inhaler  albuterol (PROVENTIL) (2.5 MG/3ML) 0.083% neb solution  alum & mag hydroxide-simethicone (MAALOX MAX) 400-400-40 MG/5ML SUSP suspension  BANOPHEN 2-0.1 % external cream  brexpiprazole (REXULTI) 2 MG tablet  busPIRone (BUSPAR) 10 MG tablet  cetirizine (ZYRTEC) 10 MG tablet  Cholecalciferol (D3 HIGH POTENCY) 25 MCG (1000 UT) CAPS  cholestyramine (QUESTRAN) 4 g packet  desvenlafaxine (PRISTIQ) 100 MG 24 hr tablet  ferrous sulfate (FEROSUL) 325 (65 Fe) MG tablet  fluocinonide (LIDEX) 0.05 %  "external cream  furosemide (LASIX) 20 MG tablet  hydroxychloroquine (PLAQUENIL) 200 MG tablet  ibuprofen (ADVIL/MOTRIN) 600 MG tablet  meclizine (ANTIVERT) 25 MG tablet  medroxyPROGESTERone (PROVERA) 10 MG tablet  metFORMIN (GLUCOPHAGE XR) 500 MG 24 hr tablet  montelukast (SINGULAIR) 10 MG tablet  OLANZapine (ZYPREXA) 2.5 MG tablet  omeprazole (PRILOSEC) 40 MG DR capsule  ondansetron (ZOFRAN-ODT) 4 MG ODT tab  pregabalin (LYRICA) 100 MG capsule  Respiratory Therapy Supplies (NEBULIZER) BRENDAN  Semaglutide 3 MG TABS  SUMAtriptan (IMITREX) 25 MG tablet  valACYclovir (VALTREX) 1000 mg tablet          Physical Exam     BP (!) 154/74   Pulse 101   Temp 98  F (36.7  C) (Oral)   Resp 20   Ht 1.575 m (5' 2\")   Wt 140.6 kg (310 lb)   SpO2 98%   BMI 56.70 kg/m    Constitutional: Awake, alert, in no acute distress.  Head: Normocephalic, atraumatic.  ENT: Mucous membranes moist.  Eyes: Conjunctiva normal.  Respiratory: Respirations even, unlabored, in no acute respiratory distress.  Cardiovascular: Regular rate and rhythm. Good peripheral perfusion.  GI: Abdomen soft, non-tender.  Musculoskeletal: Moves all 4 extremities equally.  Integument: Warm, dry.  Neurologic: Alert & oriented x 3. Normal speech. Grossly normal motor and sensory function. No focal deficits noted.  Psychiatric: Normal mood    Labs & Imaging     Imaging reviewed and independently interpreted as below;   Initial x-ray shows metal wire that appears to be in the right mainstem bronchus  Subsequent x-ray shows interval removal of the metal wire    Results for orders placed or performed during the hospital encounter of 02/16/23   Neck soft tissue XR    Impression    IMPRESSION:    Frontal radiograph is grossly unremarkable. No radiopaque foreign body identified.    Lateral radiograph is markedly overpenetrated, essentially nondiagnostic. Consider repeat lateral radiograph if clinically warranted.   Chest XR,  PA & LAT    Impression    IMPRESSION: 6.0 cm " metallic density projecting over the left lower lobe concerning for aspirated metallic foreign body.    No pneumothorax or pleural effusion. Normal size cardiac mediastinal silhouette.   Chest XR,  PA & LAT    Impression    IMPRESSION: Negative chest. Metallic foreign body no longer present.          Fausto Beatty MD  02/16/23 [id number]

## 2023-02-17 ENCOUNTER — TELEPHONE (OUTPATIENT)
Dept: PULMONOLOGY | Facility: CLINIC | Age: 33
End: 2023-02-17
Payer: COMMERCIAL

## 2023-02-17 NOTE — PROGRESS NOTES
-  Inhaled a metal wire.  Stable on room air. No hemoptysis.   - Will stay in [facility] ER. NPO.   -Discussed with pulmonary fellow.  - Will try to bring her for bronchoscopy tomorrow morning and transfer her back to the [facility] or try to discharge from PACU.   -Unable to bring her to the [facility] er as they have full capacity.     Bayron Mistry Md

## 2023-02-17 NOTE — DISCHARGE INSTRUCTIONS
You successfully coughed out to the metallic foreign body that you had previously inhaled.  Please follow-up with pulmonary on an outpatient basis for an elective flexible bronchoscopy.

## 2023-02-17 NOTE — TELEPHONE ENCOUNTER
Left Voicemail (1st Attempt) for the patient to call back and schedule the following:    Appointment type: NEW  Provider: GEN RIVERO  Return date: NEXT AVAILABLE  Specialty phone number: [phone]  Additional appointment(s) needed: FPFT  Additonal Notes: NA

## 2023-02-18 ENCOUNTER — NURSE TRIAGE (OUTPATIENT)
Dept: NURSING | Facility: CLINIC | Age: 33
End: 2023-02-18
Payer: COMMERCIAL

## 2023-02-18 ENCOUNTER — HOSPITAL ENCOUNTER (EMERGENCY)
Facility: CLINIC | Age: 33
Discharge: HOME | End: 2023-02-18
Attending: EMERGENCY MEDICINE | Admitting: EMERGENCY MEDICINE
Payer: COMMERCIAL

## 2023-02-18 ENCOUNTER — APPOINTMENT (OUTPATIENT)
Dept: CT IMAGING | Facility: CLINIC | Age: 33
End: 2023-02-18
Attending: EMERGENCY MEDICINE
Payer: COMMERCIAL

## 2023-02-18 VITALS
WEIGHT: 293 LBS | HEIGHT: 62 IN | BODY MASS INDEX: 53.92 KG/M2 | RESPIRATION RATE: 18 BRPM | TEMPERATURE: 98.5 F | HEART RATE: 99 BPM | DIASTOLIC BLOOD PRESSURE: 79 MMHG | SYSTOLIC BLOOD PRESSURE: 134 MMHG | OXYGEN SATURATION: 96 %

## 2023-02-18 DIAGNOSIS — J38.3 VOCAL CORD DYSFUNCTION: ICD-10-CM

## 2023-02-18 LAB
ANION GAP SERPL CALCULATED.3IONS-SCNC: 9 MMOL/L (ref 5–18)
BUN SERPL-MCNC: 7 MG/DL (ref 8–22)
C REACTIVE PROTEIN LHE: 1.3 MG/DL (ref 0–?)
CALCIUM SERPL-MCNC: 8.9 MG/DL (ref 8.5–10.5)
CHLORIDE BLD-SCNC: 109 MMOL/L (ref 98–107)
CO2 SERPL-SCNC: 25 MMOL/L (ref 22–31)
CREAT SERPL-MCNC: 0.63 MG/DL (ref 0.6–1.1)
ERYTHROCYTE [DISTWIDTH] IN BLOOD BY AUTOMATED COUNT: 13.9 % (ref 10–15)
GFR SERPL CREATININE-BSD FRML MDRD: >90 ML/MIN/1.73M2
GLUCOSE BLD-MCNC: 124 MG/DL (ref 70–125)
HCG SERPL QL: NEGATIVE
HCT VFR BLD AUTO: 37 % (ref 35–47)
HGB BLD-MCNC: 12.3 G/DL (ref 11.7–15.7)
MCH RBC QN AUTO: 28.7 PG (ref 26.5–33)
MCHC RBC AUTO-ENTMCNC: 33.2 G/DL (ref 31.5–36.5)
MCV RBC AUTO: 86 FL (ref 78–100)
PLATELET # BLD AUTO: 249 10E3/UL (ref 150–450)
POTASSIUM BLD-SCNC: 3.7 MMOL/L (ref 3.5–5)
RBC # BLD AUTO: 4.29 10E6/UL (ref 3.8–5.2)
SODIUM SERPL-SCNC: 143 MMOL/L (ref 136–145)
WBC # BLD AUTO: 6.8 10E3/UL (ref 4–11)

## 2023-02-18 PROCEDURE — 85027 COMPLETE CBC AUTOMATED: CPT | Performed by: EMERGENCY MEDICINE

## 2023-02-18 PROCEDURE — 250N000011 HC RX IP 250 OP 636: Performed by: EMERGENCY MEDICINE

## 2023-02-18 PROCEDURE — 99285 EMERGENCY DEPT VISIT HI MDM: CPT | Mod: 25

## 2023-02-18 PROCEDURE — 96374 THER/PROPH/DIAG INJ IV PUSH: CPT

## 2023-02-18 PROCEDURE — 71260 CT THORAX DX C+: CPT

## 2023-02-18 PROCEDURE — 70491 CT SOFT TISSUE NECK W/DYE: CPT

## 2023-02-18 PROCEDURE — 36415 COLL VENOUS BLD VENIPUNCTURE: CPT | Performed by: EMERGENCY MEDICINE

## 2023-02-18 PROCEDURE — 80048 BASIC METABOLIC PNL TOTAL CA: CPT | Performed by: EMERGENCY MEDICINE

## 2023-02-18 PROCEDURE — 84703 CHORIONIC GONADOTROPIN ASSAY: CPT | Performed by: EMERGENCY MEDICINE

## 2023-02-18 PROCEDURE — 86140 C-REACTIVE PROTEIN: CPT | Performed by: EMERGENCY MEDICINE

## 2023-02-18 RX ORDER — DEXAMETHASONE SODIUM PHOSPHATE 10 MG/ML
6 INJECTION, SOLUTION INTRAMUSCULAR; INTRAVENOUS ONCE
Status: COMPLETED | OUTPATIENT
Start: 2023-02-18 | End: 2023-02-18

## 2023-02-18 RX ORDER — IOPAMIDOL 755 MG/ML
100 INJECTION, SOLUTION INTRAVASCULAR ONCE
Status: COMPLETED | OUTPATIENT
Start: 2023-02-18 | End: 2023-02-18

## 2023-02-18 RX ADMIN — DEXAMETHASONE SODIUM PHOSPHATE 6 MG: 10 INJECTION, SOLUTION INTRAMUSCULAR; INTRAVENOUS at 21:23

## 2023-02-18 RX ADMIN — IOPAMIDOL 100 ML: 755 INJECTION, SOLUTION INTRAVENOUS at 20:27

## 2023-02-18 ASSESSMENT — ACTIVITIES OF DAILY LIVING (ADL)
ADLS_ACUITY_SCORE: 40
ADLS_ACUITY_SCORE: 40

## 2023-02-18 NOTE — TELEPHONE ENCOUNTER
Nevin reports continued symptoms since coughing up a wire that she had swallowed.    She was seen in ER on 2/16/23. She spontaneously coughed out a metal wire that had lodged in her Rt mainstem bronchus.    Shortly after that and since then she reports  - Wheezing  - Chest and back pain when breathing  - Hurts to swallow  - Increased fatigue    ER advised    Quynh Ruff RN  [facility]      Reason for Disposition    [1] Patient cleared the FB spontaneously BUT [2] continues to have coughing, hoarseness, or wheezing > 30 minutes    Additional Information    Negative: SEVERE difficulty breathing (e.g., struggling for each breath, speaks in single words)    Negative: Difficult to awaken or acting confused (e.g., disoriented, slurred speech)    Negative: Shock suspected (e.g., cold/pale/clammy skin, too weak to stand, low BP, rapid pulse)    Negative: Passed out (i.e., lost consciousness, collapsed and was not responding)    Negative: [1] Chest pain lasts > 5 minutes AND [2] age > 44    Negative: [1] Chest pain lasts > 5 minutes AND [2] age > 30 AND [3] one or more cardiac risk factors (e.g., diabetes, high blood pressure, high cholesterol, smoker, or strong family history of heart disease)    Negative: [1] Chest pain lasts > 5 minutes AND [2] history of heart disease (i.e., angina, heart attack, heart failure, bypass surgery, takes nitroglycerin)    Negative: [1] Chest pain lasts > 5 minutes AND [2] described as crushing, pressure-like, or heavy    Negative: Heart beating < 50 beats per minute OR > 140 beats per minute    Negative: Visible sweat on face or sweat dripping down face    Negative: Sounds like a life-threatening emergency to the triager    Negative: [1] Choking or struggling to breathe now AND [2] lasts > 60 seconds    Negative: Can't cough, speak, or make any noise now (i.e., stops breathing)    Negative: Has passed out or is limp.    Negative: Bluish (or gray) lips or face  now    Negative: Sounds like a life-threatening emergency to the triager    Protocols used: CHOKING - INHALED FOREIGN BODY-A-AH, CHEST PAIN-A-AH

## 2023-02-18 NOTE — ED TRIAGE NOTES
Pt presents to the ED via EMS with c/o worsening pain with swallowing and pain with breathing. Pt was here on 2/16/23 after she swallowed a wire. Today, Pt states she coughed up the wire and has had worsening pain and sx since.

## 2023-02-19 NOTE — ED PROVIDER NOTES
EMERGENCY DEPARTMENT ENCOUNTER      NAME: Nevin Alvarado  AGE: 32 year old female  YOB: 1990  MRN: [id number]  EVALUATION DATE & TIME: 2/18/2023  6:10 PM    PCP: Latonya Knight    ED PROVIDER: Jonh Singh M.D.      Chief Complaint   Patient presents with     Pharyngitis     Breathing Problem         FINAL IMPRESSION:  1. Vocal cord dysfunction          ED COURSE & MEDICAL DECISION MAKING:    Pertinent Labs & Imaging studies reviewed. (See chart for details)  ED Course as of 02/18/23 2204   Sat Feb 18, 2023 1940 Patient is a 32-year-old woman with history of present personality disorder, recurrent ingestion of sharp objects, here with pain with breathing, swallowing, over the past few days after aspiration of a thin metal wire 3 days ago.  She coughed the object up, but symptoms beginning worse since then.  On exam here she is mildly tachycardic, but has normal phonation, managing secretions well.  She seems to be splinting her breathing, but lung sounds are clear bilaterally.  Differential includes pneumonia, pneumothorax, retained foreign body, soft tissue abscess base of the neck.  IV was obtained, blood work to screen for inflammatory markers, pregnancy.  CT scanning of the neck and chest ordered with IV contrast.   2200 No sign of infection, pneumothorax, source for pain.  CT scan shows suspected vocal cord paralysis, patient reports this is longstanding, however I suspect edema is from acute inflammation, and this could be playing a role in her shortness of breath as well.  I gave a dose of Decadron to help with information.  Discharged with reassurance.         Additional ED Course Timestamps:  6:38 PM I introduced myself to the patient, obtained patient history, performed a physical exam, and discussed plan for ED workup including potential diagnostic laboratory/imaging studies and interventions.    9:34 PM Rechecked and updated the patient. We discussed the plan for discharge  and the patient is agreeable. Reviewed supportive cares, symptomatic treatment, outpatient follow up, and reasons to return to the Emergency Department. Patient to be discharged by ED RN.     Medical Decision Making    History:    Supplemental history from: Documented in chart, if applicable    External Record(s) reviewed: Inpatient Record:   and Outpatient Record:      Work Up:    Chart documentation includes differential considered and any EKGs or imaging independently interpreted by provider, where specified.    In additional to work up documented, I considered the following work up: Documented in chart, if applicable.    External consultation:    Discussion of management with another provider: Documented in chart, if applicable    Complicating factors:    Care impacted by chronic illness: Mental Health    Care affected by social determinants of health: N/A    Disposition considerations: Discharge. No recommendations on prescription strength medication(s). See documentation for any additional details.        At the conclusion of the encounter I discussed the results of all of the tests and the disposition. The questions were answered. The patient or family acknowledged understanding and was agreeable with the care plan.       MEDICATIONS GIVEN IN THE EMERGENCY:  Medications   iopamidol (ISOVUE-370) solution 100 mL (100 mLs Intravenous Given 2/18/23 2027)   dexamethasone PF (DECADRON) injection 6 mg (6 mg Intravenous Given 2/18/23 2123)         NEW PRESCRIPTIONS STARTED AT TODAY'S ER VISIT  New Prescriptions    No medications on file          =================================================================    HPI    Patient information was obtained from: patient, triage note    Use of : N/A    Nevin ZULUAGA Jenny is a 32 year old female with a pertinent history of depression, esophageal tear sequela, anxiety, pica, self-harm, gallstones, obesity, foreign body ingestion, pulmonary embolism, suicidal  "attempt, borderline personality disorder, PTSD, asthma, s/p abdominal surgery, and s/p numerous EGDs who presents to this ED for evaluation of dysphagia.    Per chart review, patient was seen on 2/16 by Dr. Beatty at [facility] Emergency Room for intentional ingestion of a metallic foreign body. The encounter was documented as follows: \"Patient is well-known to these ED for frequent visits for ingested or inserted foreign bodies as a coping mechanism as well as seeking medical attention.  Patient states that she straightened out and swallowed a metal wire from her mouthguard about 1.5 hours prior to arrival, and she is coughing repeatedly, feels like it may have been stuck in her throat somewhat higher up. X-rays reveal metallic foreign body that appears to be in the right mainstem bronchus.     Plan was to keep patient at [facility] all night and then transfer to [facility] OR for removal in the morning.  However, in the interim patient had a coughing fit and actually coughed up the metal object pictured below. This was witnessed by ED tech was in the room with her as a one-to-one.  Subsequent x-ray shows interval resolution of the previously seen metallic foreign body that patient coughed up, see photo below for further details.  Discussed with pulmonology at [facility], they agreed that if the repeat x-ray was negative and patient was not having any signs of significant hemoptysis, could be discharged home and could follow-up with them for a flexible bronchoscope on an outpatient basis.\"    Per patient, since coughing up the metal wire, she has had pain with swallowing, chest pain with breathing, wheezing when laying down, and a few episodes of hemoptysis.     She also notes pain in her left rib cage which radiates into her back.    Her symptoms are causing her to feel \"lightheaded and dizzy.\" She also feels more fatigued than normal, stating \"all I want to do is sleep.\"    Patient explains " "that due to her hysteroscopy on Thursday, she is unable to take most NSAIDs.    REVIEW OF SYSTEMS   Review of Systems   All other systems reviewed and are negative.  See HPI documentation.    VITALS:  /79   Pulse 99   Temp 98.5  F (36.9  C) (Temporal)   Resp 18   Ht 1.575 m (5' 2\")   Wt 140.6 kg (310 lb)   SpO2 96%   BMI 56.70 kg/m      PHYSICAL EXAM    Constitutional: Well developed, well nourished. Comfortable appearing.  HENT: Normocephalic, atraumatic, mucous membranes moist, nose normal. Neck- Supple, gross ROM intact. Normal phonation and managing her secretions well.  Eyes: Pupils mid-range, conjunctiva without injection, no discharge.   Respiratory: Clear to auscultation bilaterally, no respiratory distress, no wheezing, speaks full sentences easily. No cough.  Cardiovascular: Normal heart rate, regular rhythm, no murmurs.   GI: Soft, no tenderness to deep palpation in all quadrants, no masses.  Musculoskeletal: Moving all 4 extremities intentionally and without pain. No obvious deformity.  Skin: Warm, dry, no rash.  Neurologic: Alert & oriented x 3, cranial nerves grossly intact.  Psychiatric: Affect normal, cooperative.        I, Radha Prado am serving as a scribe to document services personally performed by Dr. Jonh Singh based on my observation and the provider's statements to me. I, Jonh Singh MD attest that Radha Prado is acting in a scribe capacity, has observed my performance of the services and has documented them in accordance with my direction.    Jonh Singh M.D.  Emergency Medicine  [facility] EMERGENCY ROOM  [address] [id number] [phone]  Dept: [phone]     Jonh Singh MD  02/18/23 2205    "

## 2023-02-19 NOTE — DISCHARGE INSTRUCTIONS
There is some inflammation to your vocal cords that occurred during your recent aspiration event, and because of that, there is some residual swelling.  The steroids given today should last about 24 hours, and help bring down some of the inflammation and swelling.  Try to practice vocal rest a few hours throughout the day, no speaking, this will help a lot with the swelling to go down.  I would expect symptoms to be completely resolved by next week.

## 2023-02-20 DIAGNOSIS — R05.9 COUGH: Primary | ICD-10-CM

## 2023-02-20 NOTE — TELEPHONE ENCOUNTER
Patient Contacted for the patient to call back and schedule the following:    Appointment type: NEW  Provider: MADDI  Return date: 4/14/23  Specialty phone number: [phone]  Additional appointment(s) needed: FPFT  Additonal Notes: Pt requested a mailed itinerary of appointments. Patient's street address was verified.

## 2023-02-22 NOTE — TELEPHONE ENCOUNTER
RECORDS RECEIVED FROM: internal    DATE RECEIVED: 4.14.23    NOTES STATUS DETAILS   OFFICE NOTE from referring provider     OFFICE NOTE from other specialist     DISCHARGE SUMMARY from hospital     DISCHARGE REPORT from the ER internal  7/29/22 Broddy   7/15/22 Anw    MEDICATION LIST internal     IMAGING  (NEED IMAGES AND REPORTS)     CT SCAN internal /ce Internal -2.18.23, 8/22/22, 2/8/22, 1.24.22, 3.13.21     [facility]- 1.10.23    CHEST XRAY (CXR) internal /ce Internal 2.61.23, 11.14.22, 11.3.22, 9.25.22 more in epic     [facility]- 12.15.22, 10.1.22   TESTS     PULMONARY FUNCTION TESTING (PFT) internal  Scheduled 4.14.23        Action 2.22.23 sv    Action Taken Image request sent to     [facility] -   CT- 1.10.23  CXR- 11.27.22    [facility]- CXR- 12.15.22, ---received --    [facility] 10.1.22---received --

## 2023-02-27 ENCOUNTER — HOSPITAL ENCOUNTER (EMERGENCY)
Facility: CLINIC | Age: 33
Discharge: HOME | End: 2023-02-28
Attending: EMERGENCY MEDICINE | Admitting: EMERGENCY MEDICINE
Payer: COMMERCIAL

## 2023-02-27 DIAGNOSIS — T45.0X1A ACCIDENTAL DIPHENHYDRAMINE OVERDOSE, INITIAL ENCOUNTER: ICD-10-CM

## 2023-02-27 LAB
ALBUMIN SERPL-MCNC: 3.8 G/DL (ref 3.5–5)
ALP SERPL-CCNC: 82 U/L (ref 45–120)
ALT SERPL W P-5'-P-CCNC: 30 U/L (ref 0–45)
AMPHETAMINES UR QL SCN: NORMAL
ANION GAP SERPL CALCULATED.3IONS-SCNC: 12 MMOL/L (ref 5–18)
APAP SERPL-MCNC: <3 UG/ML (ref 10–20)
AST SERPL W P-5'-P-CCNC: 18 U/L (ref 0–40)
ATRIAL RATE - MUSE: 117 BPM
BARBITURATES UR QL: NORMAL
BASOPHILS # BLD AUTO: 0 10E3/UL (ref 0–0.2)
BASOPHILS NFR BLD AUTO: 1 %
BENZODIAZ UR QL: NORMAL
BILIRUB DIRECT SERPL-MCNC: <0.1 MG/DL
BILIRUB SERPL-MCNC: 0.2 MG/DL (ref 0–1)
BUN SERPL-MCNC: 8 MG/DL (ref 8–22)
CALCIUM SERPL-MCNC: 9.5 MG/DL (ref 8.5–10.5)
CANNABINOIDS UR QL SCN: NORMAL
CHLORIDE BLD-SCNC: 107 MMOL/L (ref 98–107)
CO2 SERPL-SCNC: 23 MMOL/L (ref 22–31)
COCAINE UR QL: NORMAL
CREAT SERPL-MCNC: 0.68 MG/DL (ref 0.6–1.1)
CREAT UR-MCNC: 190 MG/DL
DIASTOLIC BLOOD PRESSURE - MUSE: NORMAL MMHG
EOSINOPHIL # BLD AUTO: 0.1 10E3/UL (ref 0–0.7)
EOSINOPHIL NFR BLD AUTO: 1 %
ERYTHROCYTE [DISTWIDTH] IN BLOOD BY AUTOMATED COUNT: 13.8 % (ref 10–15)
ETHANOL SERPL-MCNC: <10 MG/DL
GFR SERPL CREATININE-BSD FRML MDRD: >90 ML/MIN/1.73M2
GLUCOSE BLD-MCNC: 163 MG/DL (ref 70–125)
HCT VFR BLD AUTO: 41.1 % (ref 35–47)
HGB BLD-MCNC: 13.3 G/DL (ref 11.7–15.7)
HOLD SPECIMEN: NORMAL
IMM GRANULOCYTES # BLD: 0 10E3/UL
IMM GRANULOCYTES NFR BLD: 0 %
INTERPRETATION ECG - MUSE: NORMAL
LYMPHOCYTES # BLD AUTO: 1.4 10E3/UL (ref 0.8–5.3)
LYMPHOCYTES NFR BLD AUTO: 17 %
MCH RBC QN AUTO: 28.8 PG (ref 26.5–33)
MCHC RBC AUTO-ENTMCNC: 32.4 G/DL (ref 31.5–36.5)
MCV RBC AUTO: 89 FL (ref 78–100)
METHADONE UR QL SCN: NORMAL
MONOCYTES # BLD AUTO: 0.4 10E3/UL (ref 0–1.3)
MONOCYTES NFR BLD AUTO: 5 %
NEUTROPHILS # BLD AUTO: 6.5 10E3/UL (ref 1.6–8.3)
NEUTROPHILS NFR BLD AUTO: 76 %
NRBC # BLD AUTO: 0 10E3/UL
NRBC BLD AUTO-RTO: 0 /100
OPIATES UR QL SCN: NORMAL
OXYCODONE UR QL: NORMAL
P AXIS - MUSE: 35 DEGREES
PCP UR QL SCN: NORMAL
PLATELET # BLD AUTO: 273 10E3/UL (ref 150–450)
POTASSIUM BLD-SCNC: 3.8 MMOL/L (ref 3.5–5)
PR INTERVAL - MUSE: 156 MS
PROT SERPL-MCNC: 7.1 G/DL (ref 6–8)
QRS DURATION - MUSE: 80 MS
QT - MUSE: 314 MS
QTC - MUSE: 438 MS
R AXIS - MUSE: 75 DEGREES
RBC # BLD AUTO: 4.62 10E6/UL (ref 3.8–5.2)
SALICYLATES SERPL-MCNC: <8 MG/DL (ref 2–25)
SODIUM SERPL-SCNC: 142 MMOL/L (ref 136–145)
SYSTOLIC BLOOD PRESSURE - MUSE: NORMAL MMHG
T AXIS - MUSE: -6 DEGREES
VENTRICULAR RATE- MUSE: 117 BPM
WBC # BLD AUTO: 8.5 10E3/UL (ref 4–11)

## 2023-02-27 PROCEDURE — 80179 DRUG ASSAY SALICYLATE: CPT | Performed by: EMERGENCY MEDICINE

## 2023-02-27 PROCEDURE — 93005 ELECTROCARDIOGRAM TRACING: CPT | Performed by: EMERGENCY MEDICINE

## 2023-02-27 PROCEDURE — 85025 COMPLETE CBC W/AUTO DIFF WBC: CPT | Performed by: EMERGENCY MEDICINE

## 2023-02-27 PROCEDURE — 99284 EMERGENCY DEPT VISIT MOD MDM: CPT | Mod: 25

## 2023-02-27 PROCEDURE — 96360 HYDRATION IV INFUSION INIT: CPT

## 2023-02-27 PROCEDURE — 258N000003 HC RX IP 258 OP 636: Performed by: EMERGENCY MEDICINE

## 2023-02-27 PROCEDURE — 80143 DRUG ASSAY ACETAMINOPHEN: CPT | Performed by: EMERGENCY MEDICINE

## 2023-02-27 PROCEDURE — 96361 HYDRATE IV INFUSION ADD-ON: CPT

## 2023-02-27 PROCEDURE — 80307 DRUG TEST PRSMV CHEM ANLYZR: CPT | Performed by: EMERGENCY MEDICINE

## 2023-02-27 PROCEDURE — 82077 ASSAY SPEC XCP UR&BREATH IA: CPT | Performed by: EMERGENCY MEDICINE

## 2023-02-27 PROCEDURE — 36415 COLL VENOUS BLD VENIPUNCTURE: CPT | Performed by: EMERGENCY MEDICINE

## 2023-02-27 PROCEDURE — 80053 COMPREHEN METABOLIC PANEL: CPT | Performed by: EMERGENCY MEDICINE

## 2023-02-27 PROCEDURE — 82248 BILIRUBIN DIRECT: CPT | Performed by: EMERGENCY MEDICINE

## 2023-02-27 RX ADMIN — SODIUM CHLORIDE 1000 ML: 9 INJECTION, SOLUTION INTRAVENOUS at 22:21

## 2023-02-27 ASSESSMENT — ACTIVITIES OF DAILY LIVING (ADL)
ADLS_ACUITY_SCORE: 40
ADLS_ACUITY_SCORE: 40

## 2023-02-28 ENCOUNTER — NURSE TRIAGE (OUTPATIENT)
Dept: NURSING | Facility: CLINIC | Age: 33
End: 2023-02-28
Payer: COMMERCIAL

## 2023-02-28 VITALS
RESPIRATION RATE: 20 BRPM | OXYGEN SATURATION: 96 % | SYSTOLIC BLOOD PRESSURE: 160 MMHG | WEIGHT: 293 LBS | BODY MASS INDEX: 56.7 KG/M2 | TEMPERATURE: 99.7 F | DIASTOLIC BLOOD PRESSURE: 84 MMHG | HEART RATE: 113 BPM

## 2023-02-28 ASSESSMENT — ACTIVITIES OF DAILY LIVING (ADL)
ADLS_ACUITY_SCORE: 40
ADLS_ACUITY_SCORE: 40

## 2023-02-28 NOTE — ED NOTES
AIDET performed, white board updated for rounding. Patient updated on plan of care. Patient's pain assessed. Call light within reach, bed in low position, side rails up. Visitor at bedside: none     Denies SI/HI thoughts

## 2023-02-28 NOTE — ED PROVIDER NOTES
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 tablet     Cholecalciferol (D3 HIGH POTENCY) 25 MCG (1000 UT) CAPS     cholestyramine (QUESTRAN) 4 g packet     desvenlafaxine (PRISTIQ) 100 MG 24 hr tablet     ferrous sulfate (FEROSUL) 325 (65 Fe) MG tablet     fluocinonide (LIDEX) 0.05 % external cream     furosemide (LASIX) 20 MG tablet     hydroxychloroquine (PLAQUENIL)  200 MG tablet     ibuprofen (ADVIL/MOTRIN) 600 MG tablet     meclizine (ANTIVERT) 25 MG tablet     medroxyPROGESTERone (PROVERA) 10 MG tablet     metFORMIN (GLUCOPHAGE XR) 500 MG 24 hr tablet     montelukast (SINGULAIR) 10 MG tablet     OLANZapine (ZYPREXA) 2.5 MG tablet     omeprazole (PRILOSEC) 40 MG DR capsule     ondansetron (ZOFRAN-ODT) 4 MG ODT tab     pregabalin (LYRICA) 100 MG capsule     Respiratory Therapy Supplies (NEBULIZER) BRENDAN     Semaglutide 3 MG TABS     SUMAtriptan (IMITREX) 25 MG tablet     valACYclovir (VALTREX) 1000 mg tablet         ALLERGIES:  Allergies   Allergen Reactions     Amoxicillin-Pot Clavulanate Other (See Comments), Swelling and Rash     PN: facial swelling, left side. Also had cortisone injection the same day as she started the Augmentin.  Noted in downtime recovery of chart.    PN: facial swelling, left side. Also had cortisone injection the same day as she started the Augmentin.; HUT Comment: PN: facial swelling, left side. Also had cortisone injection the same day as she started the Augmentin.Noted in downtime recovery of chart.; HUT Reaction: Rash; HUT Reaction: Unknown; HUT Reaction Type: Allergy; HUT Severity: Med; HUT Noted: [id number]     Hydrocodone-Acetaminophen Nausea and Vomiting and Rash     Update on 12/12  Pt says she can take tylenol just not the hydrocodone.   Other reaction(s): Rash       Latex Rash     HUT Reaction: Rash; HUT Reaction Type: Allergy; HUT Severity: Low; HUT Noted: [id number]  Other reaction(s): Rash       Oseltamivir Hives     med stopped, PN: med stopped  med stopped, PN: med stopped; HUT Comment: med stopped, PN: med stopped; HUT Reaction: Hives; HUT Reaction Type: Allergy; HUT Severity: Med; HUT Noted: [id number]     Penicillins Anaphylaxis     HUT Reaction: Anaphylaxis; HUT Reaction Type: Allergy; HUT Severity: High; HUT Noted: [id number]     Vancomycin Itching, Swelling and Rash     Other reaction(s): Redness  Flushed face, very itchy; HUT Comment:  Flushed face, very itchy; HUT Reaction: Angioedema; HUT Reaction: Redness; HUT Severity: Med; HUT Noted: [id number]    facial     Hydrocodone Nausea and Vomiting and GI Disturbance     vomiting for days, PN: vomiting for days; HUT Comment: vomiting for days; HUT Reaction: Gastrointestinal; HUT Reaction: Nausea And Vomiting; HUT Reaction Type: Intolerance; HUT Severity: Med; HUT Noted: [id number]  vomiting for days       Blood-Group Specific Substance Other (See Comments)     Patient has an anti-Cw and non-specific antibodies. Blood product orders may be delayed. Draw one red top and two purple top tubes for all type/screen/crossmatch orders.  Patient has anti-Cw, anti-K (Angella), Warm auto and nonspecific antibodies. Blood products may be delayed. Draw patient 24 hours prior to transfusion. Draw one red top and two purple top tubes for all type and screen orders.     Clavulanic Acid Angioedema     Fentanyl Itching     Naltrexone Other (See Comments)     Reaction(s): Vivid dreams.     Other Drug Allergy (See Comments)      See original file MRN [id number]. Files are marked for merge     Oxycodone Swelling     Adhesive Tape Rash     Silicone type     Band-Aid Anti-Itch      Other reaction(s): adhesive allergy     Cephalosporins Rash     Lamotrigine Rash     Possibly associated with Lamictal.   HUT Comment: Possibly associated with Lamictal. ; HUT Reaction: Rash; HUT Reaction Type: Allergy; HUT Severity: Low; HUT Noted: [id number]       FAMILY HISTORY:  Family History   Problem Relation Age of Onset     Diabetes Type 2  Maternal Grandmother      Diabetes Type 2  Paternal Grandmother      Breast Cancer Paternal Grandmother      Cerebrovascular Disease Father 53     Myocardial Infarction No family hx of      Coronary Artery Disease Early Onset No family hx of      Ovarian Cancer No family hx of      Colon Cancer No family hx of      Depression Mother      Anxiety Disorder Mother        SOCIAL HISTORY:  Social History      Socioeconomic History     Marital status: Single   Occupational History     Occupation: On disability   Tobacco Use     Smoking status: Never     Smokeless tobacco: Never   Substance and Sexual Activity     Alcohol use: No     Alcohol/week: 0.0 standard drinks     Drug use: No     Sexual activity: Not Currently     Partners: Male     Birth control/protection: I.U.D.     Comment: IUD - Mirena - placed July, 2015   Social History Narrative    Single.    Living in independent living portion of [employer] [facility].    On disability.    No regular exercise.        VITALS:  Patient Vitals for the past 24 hrs:   BP Temp Pulse Resp SpO2 Weight   02/27/23 2145 -- -- 109 17 92 % --   02/27/23 2115 -- -- 108 16 94 % --   02/27/23 2041 -- -- 105 17 92 % --   02/27/23 1945 (!) 158/88 -- 110 18 94 % --   02/27/23 1830 (!) 175/82 99.7  F (37.6  C) 120 19 94 % 140.6 kg (310 lb)       PHYSICAL EXAM    VITAL SIGNS: BP (!) 158/88   Pulse 109   Temp 99.7  F (37.6  C)   Resp 17   Wt 140.6 kg (310 lb)   LMP 12/01/2022   SpO2 92%   BMI 56.70 kg/m      General Appearance: Obese, no acute distress   Head:  Normocephalic, without obvious abnormality, atraumatic  Eyes:  PERRL, conjunctiva/corneas clear, EOM's intact,  ENT:  Lips, mucosa, and tongue normal, membranes are moist without pallor  Neck:  Normal ROM, symmetrical, trachea midline    Cardio:  Tachycardic, no murmur, rub or gallop, 2+ pulses symmetric in all extremities  Pulm:  Clear to auscultation bilaterally, respirations unlabored,  Abdomen:  Soft, non-tender, no rebound or guarding.  Musculoskeletal: Full ROM, no edema, no cyanosis, good ROM of major joints  Integument:  Warm, Dry, No erythema, No rash.    Neurologic:  Alert & oriented.  No focal deficits appreciated.  Ambulatory.  Psychiatric:  Affect normal, Judgment normal, Mood normal.      LABS  Results for orders placed or performed during the hospital encounter of 02/27/23 (from the past 24 hour(s))   ECG 12-LEAD  WITH MUSE (LHE)   Result Value Ref Range    Systolic Blood Pressure  mmHg    Diastolic Blood Pressure  mmHg    Ventricular Rate 117 BPM    Atrial Rate 117 BPM    WY Interval 156 ms    QRS Duration 80 ms     ms    QTc 438 ms    P Axis 35 degrees    R AXIS 75 degrees    T Axis -6 degrees    Interpretation ECG       Sinus tachycardia  Cannot rule out Anterior infarct (cited on or before 15-KIKA-2023)  Abnormal ECG  When compared with ECG of 15-KIKA-2023 16:34,  Nonspecific T wave abnormality now evident in Lateral leads  Confirmed by SEE ED PROVIDER NOTE FOR, ECG INTERPRETATION ([id number]),  ELLYN LAU ([id number]) on 2/27/2023 7:28:34 PM     CBC with platelets + differential    Narrative    The following orders were created for panel order CBC with platelets + differential.  Procedure                               Abnormality         Status                     ---------                               -----------         ------                     CBC with platelets and d...[[id number]]                      Final result                 Please view results for these tests on the individual orders.   Basic metabolic panel   Result Value Ref Range    Sodium 142 136 - 145 mmol/L    Potassium 3.8 3.5 - 5.0 mmol/L    Chloride 107 98 - 107 mmol/L    Carbon Dioxide (CO2) 23 22 - 31 mmol/L    Anion Gap 12 5 - 18 mmol/L    Urea Nitrogen 8 8 - 22 mg/dL    Creatinine 0.68 0.60 - 1.10 mg/dL    Calcium 9.5 8.5 - 10.5 mg/dL    Glucose 163 (H) 70 - 125 mg/dL    GFR Estimate >90 >60 mL/min/1.73m2   Hepatic function panel   Result Value Ref Range    Bilirubin Total 0.2 0.0 - 1.0 mg/dL    Bilirubin Direct <0.1 <=0.5 mg/dL    Protein Total 7.1 6.0 - 8.0 g/dL    Albumin 3.8 3.5 - 5.0 g/dL    Alkaline Phosphatase 82 45 - 120 U/L    AST 18 0 - 40 U/L    ALT 30 0 - 45 U/L   Alcohol level blood   Result Value Ref Range    Alcohol, Blood <10 None detected mg/dL   Acetaminophen level   Result Value Ref Range    Acetaminophen <3.0 (L) 10.0 -  20.0 ug/mL   Salicylate level   Result Value Ref Range    Salicylate <8 2 - 25 mg/dL   CBC with platelets and differential   Result Value Ref Range    WBC Count 8.5 4.0 - 11.0 10e3/uL    RBC Count 4.62 3.80 - 5.20 10e6/uL    Hemoglobin 13.3 11.7 - 15.7 g/dL    Hematocrit 41.1 35.0 - 47.0 %    MCV 89 78 - 100 fL    MCH 28.8 26.5 - 33.0 pg    MCHC 32.4 31.5 - 36.5 g/dL    RDW 13.8 10.0 - 15.0 %    Platelet Count 273 150 - 450 10e3/uL    % Neutrophils 76 %    % Lymphocytes 17 %    % Monocytes 5 %    % Eosinophils 1 %    % Basophils 1 %    % Immature Granulocytes 0 %    NRBCs per 100 WBC 0 <1 /100    Absolute Neutrophils 6.5 1.6 - 8.3 10e3/uL    Absolute Lymphocytes 1.4 0.8 - 5.3 10e3/uL    Absolute Monocytes 0.4 0.0 - 1.3 10e3/uL    Absolute Eosinophils 0.1 0.0 - 0.7 10e3/uL    Absolute Basophils 0.0 0.0 - 0.2 10e3/uL    Absolute Immature Granulocytes 0.0 <=0.4 10e3/uL    Absolute NRBCs 0.0 10e3/uL   Extra Tube    Narrative    The following orders were created for panel order Extra Tube.  Procedure                               Abnormality         Status                     ---------                               -----------         ------                     Extra Purple Top Tube[[id number]]                            Final result                 Please view results for these tests on the individual orders.   Extra Purple Top Tube   Result Value Ref Range    Hold Specimen JIC      *Note: Due to a large number of results and/or encounters for the requested time period, some results have not been displayed. A complete set of results can be found in Results Review.         RADIOLOGY  No orders to display        EKG:    Rate: 117bpm  Rhythm: Sinus tachycardia  Axis: Normal  Interval: Normal  Conduction: Normal  QRS: Normal  ST: Normal  T-wave: Normal  QT: Not prolonged  Comparison EKG: No significant change compared to 15 January 2023  Impression:  No acute ischemic change   I have independently reviewed and interpreted  today's EKG, pending Cardiologist read        MEDICATIONS GIVEN IN THE EMERGENCY:  Medications - No data to display    NEW PRESCRIPTIONS STARTED AT TODAY'S ER VISIT  New Prescriptions    No medications on file        I, Dejon Cruz, am serving as a scribe to document services personally performed by Brice Goncalves D.O., based on my observations and the provider's statements to me.  I, Brice Goncalves D.O., attest that Dejon Cruz is acting in a scribe capacity, has observed my performance of the services and has documented them in accordance with my direction.     Brice Goncalves D.O.  Emergency Medicine  [facility] EMERGENCY ROOM  [facility] [facility] [place] [id number] [phone]  Dept: [phone]       Brice Goncalves,   02/27/23 2204

## 2023-02-28 NOTE — ED NOTES
"Patient reports taking 30 benadryl pills, 25mg each.   Denies taking any other drugs or alcohol.   Denies suicidal thoughts and homicidal thoughts.   States, \"I just took them because I got into an argument with my mom and it was a way to distract myself\".    Denies pain, denies any symptoms or discomfort anywhere   "

## 2023-02-28 NOTE — TELEPHONE ENCOUNTER
Seen at ED last night for Benadryl overdose. Today c/o chest pain L side of chest off and on since she woke up this AM. Chest pain lasts more than 5 min. Describes as sharp. Also c/o dizziness, feeling shaky and wobbly legs. Advised 911 now. Pt declined 911- disc'd risks but still declined -  but agreed to go to ED w/ .     Reason for Disposition    [1] Chest pain lasts > 5 minutes AND [2] age > 30 AND [3] one or more cardiac risk factors (e.g., diabetes, high blood pressure, high cholesterol, smoker, or strong family history of heart disease)    Additional Information    Negative: SEVERE difficulty breathing (e.g., struggling for each breath, speaks in single words)    Negative: Difficult to awaken or acting confused (e.g., disoriented, slurred speech)    Negative: Shock suspected (e.g., cold/pale/clammy skin, too weak to stand, low BP, rapid pulse)    Negative: Passed out (i.e., lost consciousness, collapsed and was not responding)    Negative: [1] Chest pain lasts > 5 minutes AND [2] age > 44    Protocols used: CHEST PAIN-A-AH

## 2023-02-28 NOTE — ED NOTES
Easy even and unlabored breathing   Patient is resting with eyes closed   Oxygen saturation on room air is 94%, RR 16

## 2023-02-28 NOTE — ED TRIAGE NOTES
Pt arrives via EMS after ingesting 30 25mg benadryl around 1530 today. States she was not trying to end her life but to distract herself after having bad day. Hx of swallowing things and having multiple scopes for removal but denies this now. Pt having nausea. EMS gave 4 mg ODT zofran with no relief.     Triage Assessment     Row Name 02/27/23 3684       Triage Assessment (Adult)    Airway WDL WDL       Respiratory WDL    Respiratory WDL WDL       Skin Circulation/Temperature WDL    Skin Circulation/Temperature WDL WDL       Cardiac WDL    Cardiac WDL WDL       Peripheral/Neurovascular WDL    Peripheral Neurovascular WDL WDL       Cognitive/Neuro/Behavioral WDL    Cognitive/Neuro/Behavioral WDL WDL

## 2023-03-08 ENCOUNTER — TELEPHONE (OUTPATIENT)
Dept: GASTROENTEROLOGY | Facility: CLINIC | Age: 33
End: 2023-03-08
Payer: COMMERCIAL

## 2023-03-08 NOTE — TELEPHONE ENCOUNTER
[facility] Call Center    Phone Message    May a detailed message be left on voicemail: yes     Reason for Call: Other: Nevin is calling in again asking for a call back from her care team to discuss this medication. Pt states that she is severely constipated. Please call back as soon as possible to discuss.     Action Taken: Message routed to:  [facility] GI    Travel Screening: Not Applicable

## 2023-03-08 NOTE — TELEPHONE ENCOUNTER
Spoke with nurse for pt's group home. Discussed discontinuing pt's cholestyramine medication until normal bowel movements return then adding it back once she has normal bowel movements again. Jovanni provided a fax number for order to be sent [phone] so their orders can be updated.

## 2023-03-08 NOTE — TELEPHONE ENCOUNTER
[facility] Call Center    Phone Message    May a detailed message be left on voicemail: yes     Reason for Call: Medication Question or concern regarding medication   Prescription Clarification  Name of Medication: cholestyramine (QUESTRAN) 4 g packet  Prescribing Provider: Dr. Ulloa   Pharmacy: On File    What on the order needs clarification? Pt would like a call back please to discuss decreasing the dose of this medication as it is causing constipation now. Please advise. Thank you!          Action Taken: Message routed to:  [facility] GI    Travel Screening: Not Applicable

## 2023-03-08 NOTE — TELEPHONE ENCOUNTER
"Rec'd call back from pt. Pt reports it has been \"weeks\" since she has had a \"good\" bowel movement, occasionally having blood when wiping after straining to have BM. Occasionally having small \"pebble\" bowel movements. Discussed red flag symptoms and when pt would need to go to ER. Currently eating and drinking normally. Encouraged eating more high fiber foods, drinking more water.     Pt reports she lives in a group home and new orders for taking cholestryamine would need to be given. Pt provided nurse of group home's phone name and phone number to give verbal order to reduce choolestryamine dosing.     Nurse: Nish   Phone number: [phone]    No VM set up for Nish. Spoke with pt's group home staff (phone number provided by pt's guardian Aaliyah [phone]). They need a note either through [employer] or sent via email so prescription can be changed how it is administered. Sent [employer] with recommendations. Pt to respond if a \"formal\" note is needed.  "

## 2023-03-09 ENCOUNTER — TELEPHONE (OUTPATIENT)
Dept: GASTROENTEROLOGY | Facility: CLINIC | Age: 33
End: 2023-03-09
Payer: COMMERCIAL

## 2023-03-09 NOTE — TELEPHONE ENCOUNTER
KAR, sent mychart    Appt on 4/3/23 with Carli Potter needs rescheduling due to the provider being unavailable at the scheduled time. Reschedule with the same provider, next available appt (there are openings the same day, at different times). Left L pod #

## 2023-03-10 ENCOUNTER — HOSPITAL ENCOUNTER (EMERGENCY)
Facility: CLINIC | Age: 33
Discharge: NURSING, REHAB OR LONG-TERM CARE | End: 2023-03-10
Admitting: PHYSICIAN ASSISTANT
Payer: COMMERCIAL

## 2023-03-10 ENCOUNTER — APPOINTMENT (OUTPATIENT)
Dept: CT IMAGING | Facility: CLINIC | Age: 33
End: 2023-03-10
Attending: PHYSICIAN ASSISTANT
Payer: COMMERCIAL

## 2023-03-10 VITALS
WEIGHT: 293 LBS | HEIGHT: 62 IN | DIASTOLIC BLOOD PRESSURE: 69 MMHG | HEART RATE: 95 BPM | RESPIRATION RATE: 16 BRPM | TEMPERATURE: 97 F | SYSTOLIC BLOOD PRESSURE: 122 MMHG | OXYGEN SATURATION: 95 % | BODY MASS INDEX: 53.92 KG/M2

## 2023-03-10 DIAGNOSIS — R10.12 ABDOMINAL PAIN, LEFT UPPER QUADRANT: ICD-10-CM

## 2023-03-10 LAB
ALBUMIN SERPL-MCNC: 4 G/DL (ref 3.5–5)
ALBUMIN UR-MCNC: NEGATIVE MG/DL
ALP SERPL-CCNC: 80 U/L (ref 45–120)
ALT SERPL W P-5'-P-CCNC: 29 U/L (ref 0–45)
ANION GAP SERPL CALCULATED.3IONS-SCNC: 10 MMOL/L (ref 5–18)
APPEARANCE UR: CLEAR
AST SERPL W P-5'-P-CCNC: 18 U/L (ref 0–40)
BACTERIA #/AREA URNS HPF: ABNORMAL /HPF
BILIRUB DIRECT SERPL-MCNC: 0.1 MG/DL
BILIRUB SERPL-MCNC: 0.2 MG/DL (ref 0–1)
BILIRUB UR QL STRIP: NEGATIVE
BUN SERPL-MCNC: 10 MG/DL (ref 8–22)
CALCIUM SERPL-MCNC: 9.4 MG/DL (ref 8.5–10.5)
CHLORIDE BLD-SCNC: 101 MMOL/L (ref 98–107)
CO2 SERPL-SCNC: 31 MMOL/L (ref 22–31)
COLOR UR AUTO: COLORLESS
CREAT SERPL-MCNC: 0.67 MG/DL (ref 0.6–1.1)
ERYTHROCYTE [DISTWIDTH] IN BLOOD BY AUTOMATED COUNT: 13.3 % (ref 10–15)
GFR SERPL CREATININE-BSD FRML MDRD: >90 ML/MIN/1.73M2
GLUCOSE BLD-MCNC: 118 MG/DL (ref 70–125)
GLUCOSE UR STRIP-MCNC: NEGATIVE MG/DL
HCG UR QL: NEGATIVE
HCT VFR BLD AUTO: 42.2 % (ref 35–47)
HGB BLD-MCNC: 13.7 G/DL (ref 11.7–15.7)
HGB UR QL STRIP: ABNORMAL
KETONES UR STRIP-MCNC: NEGATIVE MG/DL
LEUKOCYTE ESTERASE UR QL STRIP: NEGATIVE
LIPASE SERPL-CCNC: 21 U/L (ref 0–52)
MCH RBC QN AUTO: 29 PG (ref 26.5–33)
MCHC RBC AUTO-ENTMCNC: 32.5 G/DL (ref 31.5–36.5)
MCV RBC AUTO: 89 FL (ref 78–100)
NITRATE UR QL: NEGATIVE
PH UR STRIP: 7.5 [PH] (ref 5–7)
PLATELET # BLD AUTO: 273 10E3/UL (ref 150–450)
POTASSIUM BLD-SCNC: 3.8 MMOL/L (ref 3.5–5)
PROT SERPL-MCNC: 7.4 G/DL (ref 6–8)
RBC # BLD AUTO: 4.73 10E6/UL (ref 3.8–5.2)
RBC URINE: <1 /HPF
SODIUM SERPL-SCNC: 142 MMOL/L (ref 136–145)
SP GR UR STRIP: 1.01 (ref 1–1.03)
UROBILINOGEN UR STRIP-MCNC: <2 MG/DL
WBC # BLD AUTO: 8.4 10E3/UL (ref 4–11)
WBC URINE: 0 /HPF

## 2023-03-10 PROCEDURE — 96375 TX/PRO/DX INJ NEW DRUG ADDON: CPT

## 2023-03-10 PROCEDURE — 250N000011 HC RX IP 250 OP 636: Performed by: PHYSICIAN ASSISTANT

## 2023-03-10 PROCEDURE — 83690 ASSAY OF LIPASE: CPT | Performed by: PHYSICIAN ASSISTANT

## 2023-03-10 PROCEDURE — 74177 CT ABD & PELVIS W/CONTRAST: CPT

## 2023-03-10 PROCEDURE — 250N000013 HC RX MED GY IP 250 OP 250 PS 637: Performed by: PHYSICIAN ASSISTANT

## 2023-03-10 PROCEDURE — 85027 COMPLETE CBC AUTOMATED: CPT | Performed by: PHYSICIAN ASSISTANT

## 2023-03-10 PROCEDURE — 80053 COMPREHEN METABOLIC PANEL: CPT | Performed by: PHYSICIAN ASSISTANT

## 2023-03-10 PROCEDURE — 250N000009 HC RX 250: Performed by: PHYSICIAN ASSISTANT

## 2023-03-10 PROCEDURE — 36415 COLL VENOUS BLD VENIPUNCTURE: CPT | Performed by: PHYSICIAN ASSISTANT

## 2023-03-10 PROCEDURE — 99285 EMERGENCY DEPT VISIT HI MDM: CPT | Mod: 25

## 2023-03-10 PROCEDURE — 96376 TX/PRO/DX INJ SAME DRUG ADON: CPT

## 2023-03-10 PROCEDURE — 96374 THER/PROPH/DIAG INJ IV PUSH: CPT | Mod: 59

## 2023-03-10 PROCEDURE — 81003 URINALYSIS AUTO W/O SCOPE: CPT | Performed by: PHYSICIAN ASSISTANT

## 2023-03-10 PROCEDURE — 82248 BILIRUBIN DIRECT: CPT | Performed by: PHYSICIAN ASSISTANT

## 2023-03-10 PROCEDURE — 81025 URINE PREGNANCY TEST: CPT | Performed by: PHYSICIAN ASSISTANT

## 2023-03-10 RX ORDER — LIDOCAINE 4 G/G
1 PATCH TOPICAL EVERY 24 HOURS
Qty: 4 PATCH | Refills: 0 | Status: SHIPPED | OUTPATIENT
Start: 2023-03-10 | End: 2023-07-07

## 2023-03-10 RX ORDER — IOPAMIDOL 755 MG/ML
100 INJECTION, SOLUTION INTRAVASCULAR ONCE
Status: COMPLETED | OUTPATIENT
Start: 2023-03-10 | End: 2023-03-10

## 2023-03-10 RX ORDER — HYDROMORPHONE HYDROCHLORIDE 1 MG/ML
0.5 INJECTION, SOLUTION INTRAMUSCULAR; INTRAVENOUS; SUBCUTANEOUS ONCE
Status: COMPLETED | OUTPATIENT
Start: 2023-03-10 | End: 2023-03-10

## 2023-03-10 RX ORDER — LIDOCAINE 4 G/G
1 PATCH TOPICAL
Status: DISCONTINUED | OUTPATIENT
Start: 2023-03-10 | End: 2023-03-10 | Stop reason: HOSPADM

## 2023-03-10 RX ADMIN — HYDROMORPHONE HYDROCHLORIDE 0.5 MG: 1 INJECTION, SOLUTION INTRAMUSCULAR; INTRAVENOUS; SUBCUTANEOUS at 16:34

## 2023-03-10 RX ADMIN — LIDOCAINE 1 PATCH: 246 PATCH TOPICAL at 16:34

## 2023-03-10 RX ADMIN — FAMOTIDINE 20 MG: 10 INJECTION, SOLUTION INTRAVENOUS at 16:19

## 2023-03-10 RX ADMIN — ALUMINUM HYDROXIDE, MAGNESIUM HYDROXIDE, AND DIMETHICONE 30 ML: 200; 20; 200 SUSPENSION ORAL at 16:19

## 2023-03-10 RX ADMIN — HYDROMORPHONE HYDROCHLORIDE 0.5 MG: 1 INJECTION, SOLUTION INTRAMUSCULAR; INTRAVENOUS; SUBCUTANEOUS at 15:34

## 2023-03-10 RX ADMIN — IOPAMIDOL 100 ML: 755 INJECTION, SOLUTION INTRAVENOUS at 15:52

## 2023-03-10 ASSESSMENT — ACTIVITIES OF DAILY LIVING (ADL)
ADLS_ACUITY_SCORE: 40
ADLS_ACUITY_SCORE: 38

## 2023-03-10 NOTE — DISCHARGE INSTRUCTIONS
You were seen in the emergency department for left upper quadrant abdominal pain that wraps to the back.  Your labs and CT scan do not show any abnormalities.  Your urine does not show any evidence of kidney or urine infection.  We gave you Pepcid, GI cocktail and Dilaudid with minimal relief.  We then added on a lidocaine patch.    Your work-up is reassuring, I do not suspect any organ injury or dysfunction at this time.  Continue with symptomatic treatment at home to include Tylenol, ibuprofen, lidocaine patches, stretching and warm showers.  We expect this type of pain to improve over the next 2 to 3 days.  Follow-up with your primary care provider as needed.    Return to the emergency department for any increasing pain, uncontrolled nausea/vomiting, fever greater than 100.4 Fahrenheit, shortness of breath or chest pain.

## 2023-03-10 NOTE — ED PROVIDER NOTES
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 as she started the Augmentin.  Noted in downtime recovery of chart.    PN: facial swelling, left side. Also had cortisone injection the same day as she started the Augmentin.; HUT Comment: PN: facial swelling, left side. Also had cortisone injection the same day as she started the Augmentin.Noted  in downtime recovery of chart.; HUT Reaction: Rash; HUT Reaction: Unknown; HUT Reaction Type: Allergy; HUT Severity: Med; HUT Noted: [id number]     Hydrocodone-Acetaminophen Nausea and Vomiting and Rash     Update on 12/12  Pt says she can take tylenol just not the hydrocodone.   Other reaction(s): Rash       Latex Rash     HUT Reaction: Rash; HUT Reaction Type: Allergy; HUT Severity: Low; HUT Noted: [id number]  Other reaction(s): Rash       Oseltamivir Hives     med stopped, PN: med stopped  med stopped, PN: med stopped; HUT Comment: med stopped, PN: med stopped; HUT Reaction: Hives; HUT Reaction Type: Allergy; HUT Severity: Med; HUT Noted: [id number]     Penicillins Anaphylaxis     HUT Reaction: Anaphylaxis; HUT Reaction Type: Allergy; HUT Severity: High; HUT Noted: [id number]     Vancomycin Itching, Swelling and Rash     Other reaction(s): Redness  Flushed face, very itchy; HUT Comment: Flushed face, very itchy; HUT Reaction: Angioedema; HUT Reaction: Redness; HUT Severity: Med; HUT Noted: [id number]    facial     Hydrocodone Nausea and Vomiting and GI Disturbance     vomiting for days, PN: vomiting for days; HUT Comment: vomiting for days; HUT Reaction: Gastrointestinal; HUT Reaction: Nausea And Vomiting; HUT Reaction Type: Intolerance; HUT Severity: Med; HUT Noted: [id number]  vomiting for days       Blood-Group Specific Substance Other (See Comments)     Patient has an anti-Cw and non-specific antibodies. Blood product orders may be delayed. Draw one red top and two purple top tubes for all type/screen/crossmatch orders.  Patient has anti-Cw, anti-K (Angella), Warm auto and nonspecific antibodies. Blood products may be delayed. Draw patient 24 hours prior to transfusion. Draw one red top and two purple top tubes for all type and screen orders.     Clavulanic Acid Angioedema     Fentanyl Itching     Naltrexone Other (See Comments)     Reaction(s): Vivid dreams.     Other Drug Allergy (See Comments)      See original file CONRAD  [id number]. Files are marked for merge     Oxycodone Swelling     Adhesive Tape Rash     Silicone type     Band-Aid Anti-Itch      Other reaction(s): adhesive allergy     Cephalosporins Rash     Lamotrigine Rash     Possibly associated with Lamictal.   HUT Comment: Possibly associated with Lamictal. ; HUT Reaction: Rash; HUT Reaction Type: Allergy; HUT Severity: Low; HUT Noted: [id number]       FAMILY HISTORY:  Family History   Problem Relation Age of Onset     Diabetes Type 2  Maternal Grandmother      Diabetes Type 2  Paternal Grandmother      Breast Cancer Paternal Grandmother      Cerebrovascular Disease Father 53     Myocardial Infarction No family hx of      Coronary Artery Disease Early Onset No family hx of      Ovarian Cancer No family hx of      Colon Cancer No family hx of      Depression Mother      Anxiety Disorder Mother        SOCIAL HISTORY:   Social History     Socioeconomic History     Marital status: Single     Spouse name: Not on file     Number of children: Not on file     Years of education: Not on file     Highest education level: Not on file   Occupational History     Occupation: On disability   Tobacco Use     Smoking status: Never     Smokeless tobacco: Never   Vaping Use     Vaping Use: Not on file   Substance and Sexual Activity     Alcohol use: No     Alcohol/week: 0.0 standard drinks     Drug use: No     Sexual activity: Not Currently     Partners: Male     Birth control/protection: I.U.D.     Comment: IUD - Mirena - placed July, 2015   Other Topics Concern     Parent/sibling w/ CABG, MI or angioplasty before 65F 55M? Not Asked   Social History Narrative    Single.    Living in independent living portion of [employer] [facility].    On disability.    No regular exercise.      Social Determinants of Health     Financial Resource Strain: Not on file   Food Insecurity: Not on file   Transportation Needs: Not on file   Physical Activity: Not on file   Stress: Not on file   Social Connections:  "Not on file   Intimate Partner Violence: Not on file   Housing Stability: Not on file         VITALS:  Patient Vitals for the past 24 hrs:   BP Temp Temp src Pulse Resp SpO2 Height Weight   03/10/23 1600 119/68 -- -- 94 -- 95 % -- --   03/10/23 1545 117/65 -- -- 91 -- 95 % -- --   03/10/23 1500 (!) 144/85 97  F (36.1  C) Temporal 98 16 97 % 1.575 m (5' 2\") 140.6 kg (310 lb)       PHYSICAL EXAM    Physical Exam  Vitals reviewed.   Constitutional:       General: She is not in acute distress.     Appearance: Normal appearance. She is obese. She is not ill-appearing, toxic-appearing or diaphoretic.   HENT:      Head: Normocephalic and atraumatic.      Nose: No congestion.      Mouth/Throat:      Mouth: Mucous membranes are moist.      Pharynx: Oropharynx is clear.   Eyes:      General: No scleral icterus.        Right eye: No discharge.         Left eye: No discharge.   Cardiovascular:      Rate and Rhythm: Normal rate and regular rhythm.      Heart sounds: No murmur heard.  Pulmonary:      Effort: Pulmonary effort is normal. No respiratory distress.      Breath sounds: Normal breath sounds. No stridor. No wheezing or rales.   Abdominal:      General: Abdomen is flat. There is no distension.      Palpations: Abdomen is soft.      Tenderness: There is abdominal tenderness in the epigastric area and left upper quadrant. There is no right CVA tenderness, left CVA tenderness or guarding.      Comments: No CVA ttp bilaterally   Musculoskeletal:         General: No swelling or deformity. Normal range of motion.      Cervical back: Normal range of motion and neck supple.      Right lower leg: No edema.      Left lower leg: No edema.   Skin:     General: Skin is warm.      Capillary Refill: Capillary refill takes less than 2 seconds.      Coloration: Skin is not jaundiced.      Findings: No bruising, erythema, lesion or rash.   Neurological:      General: No focal deficit present.      Mental Status: She is alert and oriented " to person, place, and time.      Cranial Nerves: No cranial nerve deficit.   Psychiatric:         Mood and Affect: Mood normal.         Behavior: Behavior normal.      Comments: Flat affect          LAB:  All pertinent labs reviewed and interpreted.    Labs Ordered and Resulted from Time of ED Arrival to Time of ED Departure   UA MACROSCOPIC WITH REFLEX TO MICRO AND CULTURE - Abnormal       Result Value    Color Urine Colorless      Appearance Urine Clear      Glucose Urine Negative      Bilirubin Urine Negative      Ketones Urine Negative      Specific Gravity Urine 1.010      Blood Urine 0.06 mg/dL (*)     pH Urine 7.5 (*)     Protein Albumin Urine Negative      Urobilinogen Urine <2.0      Nitrite Urine Negative      Leukocyte Esterase Urine Negative      Bacteria Urine Few (*)     RBC Urine <1      WBC Urine 0     CBC WITH PLATELETS - Normal    WBC Count 8.4      RBC Count 4.73      Hemoglobin 13.7      Hematocrit 42.2      MCV 89      MCH 29.0      MCHC 32.5      RDW 13.3      Platelet Count 273     BASIC METABOLIC PANEL - Normal    Sodium 142      Potassium 3.8      Chloride 101      Carbon Dioxide (CO2) 31      Anion Gap 10      Urea Nitrogen 10      Creatinine 0.67      Calcium 9.4      Glucose 118      GFR Estimate >90     HEPATIC FUNCTION PANEL - Normal    Bilirubin Total 0.2      Bilirubin Direct 0.1      Protein Total 7.4      Albumin 4.0      Alkaline Phosphatase 80      AST 18      ALT 29     LIPASE - Normal    Lipase 21     HCG QUALITATIVE URINE         RADIOLOGY:  Reviewed all pertinent imaging. Please see official radiology report    CT Abdomen Pelvis w Contrast   Final Result   IMPRESSION:    1.  No acute findings in the abdomen and pelvis.   2.  Incidental findings as detailed above.        Lilliana De La Rosa PA-C  Emergency Medicine  [facility] EMERGENCY ROOM  [facility] [address] [phone]  Dept:  [phone]    This note has in part been created with speech recognition technology and may create an occasional, unintended word/grammar substitution. Errors are generally corrected in real time. Please message me via [employer] In Basket if you note any errors requiring clarification.       Lilliana De La Rosa PA-C  03/10/23 1640

## 2023-03-10 NOTE — ED TRIAGE NOTES
The patient presents to the ED via EMS with left sided abdominal pain that began 2 days ago. History of chronic abdominal pain. The patient reports trying heat, tylenol and ibuprofen without relief. Reports feeling constipated. Patient comes from group home and is seen frequently in this ED due to ingesting non-food items. The patient requires 1:1 supervision at her group home as well as in the hospital to prevent further episodes of non-food ingestion.

## 2023-03-11 ENCOUNTER — APPOINTMENT (OUTPATIENT)
Dept: RADIOLOGY | Facility: CLINIC | Age: 33
End: 2023-03-11
Attending: EMERGENCY MEDICINE
Payer: COMMERCIAL

## 2023-03-11 ENCOUNTER — ANESTHESIA EVENT (OUTPATIENT)
Dept: SURGERY | Facility: CLINIC | Age: 33
End: 2023-03-11
Payer: COMMERCIAL

## 2023-03-11 ENCOUNTER — HOSPITAL ENCOUNTER (OUTPATIENT)
Facility: CLINIC | Age: 33
Discharge: HOME | End: 2023-03-12
Attending: EMERGENCY MEDICINE | Admitting: EMERGENCY MEDICINE
Payer: COMMERCIAL

## 2023-03-11 ENCOUNTER — ANESTHESIA (OUTPATIENT)
Dept: SURGERY | Facility: CLINIC | Age: 33
End: 2023-03-11
Payer: COMMERCIAL

## 2023-03-11 DIAGNOSIS — F41.9 ANXIETY: ICD-10-CM

## 2023-03-11 DIAGNOSIS — T18.9XXA SWALLOWED FOREIGN BODY, INITIAL ENCOUNTER: Primary | ICD-10-CM

## 2023-03-11 LAB
GLUCOSE BLDC GLUCOMTR-MCNC: 119 MG/DL (ref 70–99)
UPPER GI ENDOSCOPY: NORMAL

## 2023-03-11 PROCEDURE — 99285 EMERGENCY DEPT VISIT HI MDM: CPT

## 2023-03-11 PROCEDURE — 272N000001 HC OR GENERAL SUPPLY STERILE: Performed by: INTERNAL MEDICINE

## 2023-03-11 PROCEDURE — 250N000013 HC RX MED GY IP 250 OP 250 PS 637: Performed by: EMERGENCY MEDICINE

## 2023-03-11 PROCEDURE — 250N000011 HC RX IP 250 OP 636: Performed by: ANESTHESIOLOGY

## 2023-03-11 PROCEDURE — 250N000011 HC RX IP 250 OP 636: Performed by: NURSE ANESTHETIST, CERTIFIED REGISTERED

## 2023-03-11 PROCEDURE — 74018 RADEX ABDOMEN 1 VIEW: CPT | Mod: 76

## 2023-03-11 PROCEDURE — 250N000011 HC RX IP 250 OP 636

## 2023-03-11 PROCEDURE — 710N000010 HC RECOVERY PHASE 1, LEVEL 2, PER MIN: Performed by: INTERNAL MEDICINE

## 2023-03-11 PROCEDURE — 370N000017 HC ANESTHESIA TECHNICAL FEE, PER MIN: Performed by: INTERNAL MEDICINE

## 2023-03-11 PROCEDURE — 360N000075 HC SURGERY LEVEL 2, PER MIN: Performed by: INTERNAL MEDICINE

## 2023-03-11 PROCEDURE — 258N000003 HC RX IP 258 OP 636: Performed by: NURSE ANESTHETIST, CERTIFIED REGISTERED

## 2023-03-11 PROCEDURE — 250N000011 HC RX IP 250 OP 636: Performed by: EMERGENCY MEDICINE

## 2023-03-11 PROCEDURE — 82962 GLUCOSE BLOOD TEST: CPT

## 2023-03-11 PROCEDURE — 96372 THER/PROPH/DIAG INJ SC/IM: CPT | Performed by: EMERGENCY MEDICINE

## 2023-03-11 PROCEDURE — 250N000025 HC SEVOFLURANE, PER MIN: Performed by: INTERNAL MEDICINE

## 2023-03-11 PROCEDURE — 74018 RADEX ABDOMEN 1 VIEW: CPT

## 2023-03-11 PROCEDURE — 250N000009 HC RX 250: Performed by: NURSE ANESTHETIST, CERTIFIED REGISTERED

## 2023-03-11 RX ORDER — SODIUM CHLORIDE, SODIUM LACTATE, POTASSIUM CHLORIDE, CALCIUM CHLORIDE 600; 310; 30; 20 MG/100ML; MG/100ML; MG/100ML; MG/100ML
INJECTION, SOLUTION INTRAVENOUS CONTINUOUS PRN
Status: DISCONTINUED | OUTPATIENT
Start: 2023-03-11 | End: 2023-03-11

## 2023-03-11 RX ORDER — SODIUM CHLORIDE, SODIUM LACTATE, POTASSIUM CHLORIDE, CALCIUM CHLORIDE 600; 310; 30; 20 MG/100ML; MG/100ML; MG/100ML; MG/100ML
INJECTION, SOLUTION INTRAVENOUS CONTINUOUS
Status: DISCONTINUED | OUTPATIENT
Start: 2023-03-11 | End: 2023-03-11 | Stop reason: HOSPADM

## 2023-03-11 RX ORDER — HYDROMORPHONE HCL IN WATER/PF 6 MG/30 ML
0.2 PATIENT CONTROLLED ANALGESIA SYRINGE INTRAVENOUS EVERY 5 MIN PRN
Status: DISCONTINUED | OUTPATIENT
Start: 2023-03-11 | End: 2023-03-11 | Stop reason: HOSPADM

## 2023-03-11 RX ORDER — DIPHENHYDRAMINE HYDROCHLORIDE 50 MG/ML
INJECTION INTRAMUSCULAR; INTRAVENOUS
Status: COMPLETED
Start: 2023-03-11 | End: 2023-03-11

## 2023-03-11 RX ORDER — ONDANSETRON 2 MG/ML
4 INJECTION INTRAMUSCULAR; INTRAVENOUS EVERY 30 MIN PRN
Status: DISCONTINUED | OUTPATIENT
Start: 2023-03-11 | End: 2023-03-11 | Stop reason: HOSPADM

## 2023-03-11 RX ORDER — ONDANSETRON 4 MG/1
4 TABLET, ORALLY DISINTEGRATING ORAL EVERY 30 MIN PRN
Status: DISCONTINUED | OUTPATIENT
Start: 2023-03-11 | End: 2023-03-11 | Stop reason: HOSPADM

## 2023-03-11 RX ORDER — PROPOFOL 10 MG/ML
INJECTION, EMULSION INTRAVENOUS PRN
Status: DISCONTINUED | OUTPATIENT
Start: 2023-03-11 | End: 2023-03-11

## 2023-03-11 RX ORDER — HYDROMORPHONE HYDROCHLORIDE 2 MG/1
2 TABLET ORAL ONCE
Status: COMPLETED | OUTPATIENT
Start: 2023-03-11 | End: 2023-03-11

## 2023-03-11 RX ORDER — HYDROMORPHONE HCL IN WATER/PF 6 MG/30 ML
0.4 PATIENT CONTROLLED ANALGESIA SYRINGE INTRAVENOUS EVERY 5 MIN PRN
Status: DISCONTINUED | OUTPATIENT
Start: 2023-03-11 | End: 2023-03-11 | Stop reason: HOSPADM

## 2023-03-11 RX ORDER — KETOROLAC TROMETHAMINE 15 MG/ML
15 INJECTION, SOLUTION INTRAMUSCULAR; INTRAVENOUS ONCE
Status: COMPLETED | OUTPATIENT
Start: 2023-03-11 | End: 2023-03-11

## 2023-03-11 RX ORDER — DIPHENHYDRAMINE HYDROCHLORIDE 50 MG/ML
25 INJECTION INTRAMUSCULAR; INTRAVENOUS ONCE
Status: COMPLETED | OUTPATIENT
Start: 2023-03-11 | End: 2023-03-11

## 2023-03-11 RX ORDER — LIDOCAINE 40 MG/G
CREAM TOPICAL
Status: DISCONTINUED | OUTPATIENT
Start: 2023-03-11 | End: 2023-03-11 | Stop reason: HOSPADM

## 2023-03-11 RX ORDER — LIDOCAINE HYDROCHLORIDE 10 MG/ML
INJECTION, SOLUTION INFILTRATION; PERINEURAL PRN
Status: DISCONTINUED | OUTPATIENT
Start: 2023-03-11 | End: 2023-03-11

## 2023-03-11 RX ADMIN — HYDROMORPHONE HYDROCHLORIDE 2 MG: 2 TABLET ORAL at 19:58

## 2023-03-11 RX ADMIN — HYDROMORPHONE HYDROCHLORIDE 0.2 MG: 0.2 INJECTION, SOLUTION INTRAMUSCULAR; INTRAVENOUS; SUBCUTANEOUS at 22:12

## 2023-03-11 RX ADMIN — DIPHENHYDRAMINE HYDROCHLORIDE 25 MG: 50 INJECTION INTRAMUSCULAR; INTRAVENOUS at 22:24

## 2023-03-11 RX ADMIN — SODIUM CHLORIDE, POTASSIUM CHLORIDE, SODIUM LACTATE AND CALCIUM CHLORIDE: 600; 310; 30; 20 INJECTION, SOLUTION INTRAVENOUS at 21:39

## 2023-03-11 RX ADMIN — KETOROLAC TROMETHAMINE 15 MG: 15 INJECTION, SOLUTION INTRAMUSCULAR; INTRAVENOUS at 19:57

## 2023-03-11 RX ADMIN — PROPOFOL 200 MG: 10 INJECTION, EMULSION INTRAVENOUS at 21:43

## 2023-03-11 RX ADMIN — LIDOCAINE HYDROCHLORIDE 2 ML: 10 INJECTION, SOLUTION INFILTRATION; PERINEURAL at 21:43

## 2023-03-11 RX ADMIN — Medication 100 MG: at 21:43

## 2023-03-11 RX ADMIN — DIPHENHYDRAMINE HYDROCHLORIDE 25 MG: 50 INJECTION INTRAMUSCULAR; INTRAVENOUS at 22:52

## 2023-03-11 ASSESSMENT — ACTIVITIES OF DAILY LIVING (ADL)
ADLS_ACUITY_SCORE: 40
ADLS_ACUITY_SCORE: 40

## 2023-03-12 VITALS
TEMPERATURE: 97.5 F | SYSTOLIC BLOOD PRESSURE: 149 MMHG | RESPIRATION RATE: 18 BRPM | HEART RATE: 98 BPM | BODY MASS INDEX: 56.7 KG/M2 | DIASTOLIC BLOOD PRESSURE: 83 MMHG | WEIGHT: 293 LBS | OXYGEN SATURATION: 93 %

## 2023-03-12 ASSESSMENT — ACTIVITIES OF DAILY LIVING (ADL)
ADLS_ACUITY_SCORE: 40
ADLS_ACUITY_SCORE: 40

## 2023-03-12 NOTE — H&P
GENERAL PRE-PROCEDURE:   Procedure:  Egd  Date/Time:  3/11/2023 9:41 PM    Verbal consent obtained?: Yes    Risks and benefits: Risks, benefits and alternatives were discussed    Consent given by:  Guardian  Patient states understanding of procedure being performed: Yes    Patient's understanding of procedure matches consent: Yes    Procedure consent matches procedure scheduled: Yes    Appropriately NPO:  Yes  ASA Class:  4  Lungs:  Lungs clear with good breath sounds bilaterally  Heart:  Normal heart sounds and rate  History & Physical reviewed:  History and physical reviewed and no updates needed  Statement of review:  I have reviewed the lab findings, diagnostic data, medications, and the plan for sedation

## 2023-03-12 NOTE — ED TRIAGE NOTES
The patient presents to the ED via EMS after purposefully swallowing a twist tie. She is reporting epigastric pain. History of PICA. Airway intact, no breathing problems noted. No emesis or difficulty swallowing. Patient speaking in full sentences and no distress. She is supposed to have a 1:1 sitter at her group home to prevent episodes such as this.

## 2023-03-12 NOTE — ED PROVIDER NOTES
"EMERGENCY DEPARTMENT ENCOUNTER      NAME: Nevin Alvarado  YOB: 1990  MRN: [id number]    FINAL IMPRESSION  1. Swallowed foreign body, initial encounter    2. Anxiety        MEDICAL DECISION MAKING   Pertinent Labs & Imaging studies reviewed. (See chart for details)    Nevin Alvarado is a 32 year old female who presents for evaluation of a swallowed foreign body and anxiety.  Records reviewed.  Patient has a long well-documented history of swallowing foreign bodies and mental health diagnoses.  She has been seen multiple times in this ED as well as various hospitals around the [facility].  She does have a care plan in place which I reviewed.  In summary, it is recommended that ED providers avoid CT scans if possible and instead, proceed with x-ray of abdomen to assess for foreign bodies, be due to issues with IV pain medicine given history of chronic abdominal pain.  Patient was most recently seen here in the ED yesterday for evaluation of left upper quadrant abdominal pain.  She states that she felt better when she left, specifically after receiving IV Dilaudid.  This afternoon, she had relatively sudden onset of anxiety and reports that she swallowed a zip tie made of wire and plastic.  This occurred earlier this afternoon and since, she has developed some upper abdominal discomfort.  She believes that she was triggered to swallow this object after being seen here yesterday, states that once she is back in the medical system she starts to feel more anxiety.  She did attempt to use her skills but did not find them to be useful.  In addition to the swallowed foreign body, patient also was requesting to meet with a psychiatrist or mental health professional.  She reports that the psychiatrist she has been working with for the past 5 years is attempting to wean her off of her chronic psych medications and she would like a \"second opinion.\"  She has also been seeing a therapist for the last " 6 months and is working on DBT with this provider.  She has found it to be somewhat useful thus far.  Patient has no other new complaints, either physical or mental. Remainder of history and exam, as below.     I considered a broad differential including but not limited to swallowed foreign body, perforation, peritonitis, obstruction, exacerbation of known psychiatric disorder, secondary gain.  Discussed options for work-up and management with the patient.  We agreed on plan to start with x-ray of the abdomen and management of discomfort with p.o. Dilaudid and IM Toradol.  If at all possible, would like to avoid placing an IV and giving IV analgesics but if GI needs to intervene, we would place an IV for procedures.  At this point, I do not see an indication for laboratory work-up.  Patient would like to meet with a mental health professional so we will have her speak with DEC once medically cleared.    XR abdomen revealed a new linear foreign body measuring approximately 10 cm in length overlying the distal thoracic esophagus, gastroesophageal junction and proximal stomach. No radiographic evidence of bowel obstruction or pneumoperitoneum. Will plan to review with GI.    I discussed the case with Dr. Kumar with [facility] who recommended endoscopic removal under sedation but agreed with plan to hold on giving IV opiates.  He graciously called in his team and anesthesiology and patient was taken to the OR for this procedure.  While there, group home staff was consented for procedure and voiced some concern about her recurrent ingestions and requested a 72-hour hold.  Patient was hoping to meet with mental health professional anyway and given this is a chronic problem, I am not confident that she would meet criteria for a hold if she requests to leave.    Patient was transported back to the ED after recovering in the PACU.  She did have some itchiness and was given dose of Benadryl with relief.  Patient then  attempted to sip on some water but reported she began to experience severe left upper quadrant pain.  On exam, she did have mild tenderness to palpation in this area.  Although I feel perforation or complication from procedure is very unlikely, we agreed on plan to obtain a repeat x-ray.  We will also continue with plan to have her speak with DEC.  Patient is currently here voluntarily and would defer to mental health team regarding recommendations for ongoing mental health care.  As this is a chronic problem, she is not voicing any active SI or thoughts of self-harm, do not feel she would like to meet criteria for hold.    Patient was signed out to [facility] team pending evaluation by DEC and results of x-ray.        Medical Decision Making    History:    Supplemental history from: N/A    External Record(s) reviewed: Documented in chart, if applicable.    Work Up:    Chart documentation includes differential considered and any EKGs or imaging independently interpreted by provider, where specified.    In additional to work up documented, I considered the following work up: Documented in chart, if applicable.    External consultation:    Discussion of management with another provider: Documented in chart, if applicable    Complicating factors:    Care impacted by chronic illness: Mental Health    Care affected by social determinants of health: N/A    Disposition considerations: Admission considered. Patient was signed out to the [facility] physician, disposition pending.          ED COURSE  6:45 PM I met the patient and performed my initial interview and exam.   8:27 PM I spoke with ROSI Sutton.  8:32 PM I rechecked and updated the patient.  9:25 PM I rechecked the patient. She is being brought to the OR now.  9:54 PM I spoke with the patient's group home, who state they would like the patient to be under a 72 hour hold. I communicated this to the OR.   10:50 PM I rechecked the patient. She is feeling  itchy.  11:45 PM I rechecked the patient. She had a couple sips of water and is now feeling pain in her LUQ which is new.       MEDICATIONS GIVEN IN THE ED  Medications   HYDROmorphone (DILAUDID) tablet 2 mg (2 mg Oral $Given 3/11/23 1958)   ketorolac (TORADOL) injection 15 mg (15 mg Intramuscular $Given 3/11/23 1957)   diphenhydrAMINE (BENADRYL) injection 25 mg (25 mg Intravenous $Given 3/11/23 2224)   diphenhydrAMINE (BENADRYL) injection 25 mg (25 mg Intravenous $Given 3/11/23 2252)       NEW PRESCRIPTIONS STARTED AT TODAY'S VISIT  New Prescriptions    No medications on file          =================================================================    Chief Complaint   Patient presents with     Swallowed Foreign Body         HPI:    Patient information was obtained from: patient     Use of : N/A     Nevin Alvarado is a 32 year old female who presents to the ED for evaluation of foreign bdy ingestion.    The patient reports intentionally swallowing a plastic-coated metal twist tie at ~4:30 PM today. She denies ingesting anything else. Since then, patient has developed some epigastric abdominal pain. She denies any nausea, vomiting, or difficulty breathing. The patient reports she was feeling anxious prior to swallowing the object. She is not sure why she was anxious, but notes that she tends to be more anxious when she is in the hospital and she was seen here yesterday for abdominal pain. She states she did try taking her anxiety medication and utilizing coping skills before ingesting the object, but denies any relief with this.     The patient notes she is working with a therapist both 1-on-1 and also in a DBT setting. She has been seeing the same therapist the past ~6 months and has a desire to change therapists. Feels like their conversations are no longer productive. The patient otherwise feels the DBT has been helpful. She also states she is working with her psychiatrist to wean off of some  of her psychiatric medications. She is currently tapering off of one med, is not sure which one. She states she likes her psychiatrist but would appreciate getting a second opinion while here. She is wondering if trying to get off of her medications is contributing to her anxiety lately. The patient endorses some anxiety at present. She denies having anxiety all the time. States it just comes on randomly here and there.    PMHx:  Anxiety, depression, frequent foreign body ingestion requiring EGD for retrieval ~2x monthly, chronic abdominal pain, GERD, borderline personality disorder, intentional overdose    Of note, the patient reports allergies to hydrocodone and oxycodone. She recalls tolerating dilaudid in the past for pain.           RELEVANT HISTORY, MEDICATIONS, & ALLERGIES   Past medical history, surgical history, family history, medications, and allergies reviewed and pertinent noted in HPI.    REVIEW OF SYSTEMS:  A complete review of systems was performed with pertinent positives and negatives noted in the HPI. All other systems negative.     PHYSICAL EXAM:    Vitals: BP (!) 164/73 (BP Location: Right arm)   Pulse 98   Temp 97  F (36.1  C)   Resp 27   Wt 140.6 kg (310 lb)   LMP 12/01/2022   SpO2 93%   BMI 56.70 kg/m     General: Alert and interactive, comfortable appearing.  HENT: Atraumatic. Full AROM of neck. Conjunctiva clear.   Cardiovascular: Regular rate and rhythm.   Chest/Pulmonary: Normal work of breathing. Speaking in complete sentences. Lungs CTAB. No chest wall tenderness or deformities.  Abdomen: Soft, nondistended.  Mild tenderness palpation upper abdomen without guarding or rebound.  Extremities: Normal AROM of all major joints.  Skin: Warm and dry. Normal skin color.   Neuro: Speech clear. CNs grossly intact. Moves all extremities spontaneously.   Psych: Flat affect/mood, cooperative, memory appropriate.  No  RADIOLOGY  XR Abdomen 1 View   Final Result   IMPRESSION: New linear foreign  body measuring approximately 10 cm in length overlying the distal thoracic esophagus, gastroesophageal junction and proximal stomach. No radiographic evidence of bowel obstruction or pneumoperitoneum. Prior cholecystectomy.    Intrauterine device again noted. Bones are unchanged.      XR Abdomen Upright Only    (Results Pending)         All laboratory and imaging results were personally reviewed and interpreted by myself prior to disposition decision.       I, Lizz Roland, am serving as a scribe to document services personally performed by Dr. Becca Bee based on my observation and the provider's statements to me. I, Becca Bee MD attest that Lizz Roland is acting in a scribe capacity, has observed my performance of the services and has documented them in accordance with my direction.    Becca Bee M.D.  Emergency Medicine  [facility] EMERGENCY ROOM  [address] [address]  [phone]  Dept: [phone]     Becca Bee MD  03/11/23 6449

## 2023-03-12 NOTE — ANESTHESIA CARE TRANSFER NOTE
Patient: Nevin Alvarado    Procedure: Procedure(s):  ESOPHAGOGASTRODUODENOSCOPY (EGD) WITH FOREIGN BODY REMOVAL       Diagnosis: Swallowed foreign body, initial encounter [T18.9XXA]  Diagnosis Additional Information: No value filed.    Anesthesia Type:   General     Note:    Oropharynx: oropharynx clear of all foreign objects  Level of Consciousness: awake  Oxygen Supplementation: nasal cannula  Level of Supplemental Oxygen (L/min / FiO2): 2  Independent Airway: airway patency satisfactory and stable  Dentition: dentition unchanged  Vital Signs Stable: post-procedure vital signs reviewed and stable  Report to RN Given: handoff report given  Patient transferred to: PACU    Handoff Report: Identifed the Patient, Identified the Reponsible Provider, Reviewed the pertinent medical history, Discussed the surgical course, Reviewed Intra-OP anesthesia mangement and issues during anesthesia, Set expectations for post-procedure period and Allowed opportunity for questions and acknowledgement of understanding      Vitals:  Vitals Value Taken Time   /95 03/11/23 2202   Temp 36.9  C (98.5  F) 03/11/23 2201   Pulse 101 03/11/23 2203   Resp 16 03/11/23 2203   SpO2 96 % 03/11/23 2203   Vitals shown include unvalidated device data.    Electronically Signed By: HU De Luna CRNA  March 11, 2023  10:04 PM

## 2023-03-12 NOTE — PROGRESS NOTES
DEC  called the nurse. Pt is currently in the OR so the DEC assessment was cancelled. If pt needs a DEC assessment, they will resubmit the request.

## 2023-03-12 NOTE — PROGRESS NOTES
[facility] consult         Name: Nevin Alvarado    Medical Record #: [id number]    YOB: 1990    Date/Time: 3/11/2023/9:29 PM    Reason for Consultation: Becca Bee MD has asked me to evaluate Nevin Alvarado regarding foreign body ingestion.    HPI: 32 years old female with known history of frequent foreign body ingestion.  She swallowed a twist tie complaining of abdominal pain.  X-ray showing the wrist I    Past Medical History:  Past Medical History:   Diagnosis Date     ADD (attention deficit disorder)      Anorexia nervosa with bulimia     history of; on Topamax     Anxiety      Asthma      Borderline personality disorder (H)      Depression      Eating disorder      H/O self-harm      h/o Suicide attempt 02/21/2018     History of pulmonary embolism 12/2019    Provoked. Completed 3 month course of Apixaban     Morbid obesity      Neuropathy      Obesity      PTSD (post-traumatic stress disorder)      Pulmonary emboli (H)      Rectal foreign body - Recurrent issue, self placed      Self-injurious behavior     hx swallowing nonfood items such as mickie pins     Sleep apnea     uses cpap     Suicidal overdose (H)      Swallowed foreign body - Recurrent issue, self placed      Syncope        Medications:   (Not in a hospital admission)      Current Facility-Administered Medications:      lactated ringers infusion, , Intravenous, Continuous, Nish Nguyen MD     lidocaine (LMX4) cream, , Topical, Q1H PRN, Cruz Kumar MD     lidocaine (LMX4) cream, , Topical, Q1H PRN, Nish Nguyen MD     lidocaine 1 % 0.1-1 mL, 0.1-1 mL, Other, Q1H PRN, Cruz Kumar MD     lidocaine 1 % 0.1-1 mL, 0.1-1 mL, Other, Q1H PRN, Nish Nguyen MD     sodium chloride (PF) 0.9% PF flush 3 mL, 3 mL, Intracatheter, Q8H, Cruz Kumar MD     sodium chloride (PF) 0.9% PF flush 3 mL, 3 mL, Intracatheter, q1 min prn, Cruz Kumar MD     sodium chloride  (PF) 0.9% PF flush 3 mL, 3 mL, Intracatheter, Q8H, Nish Nguyen MD     sodium chloride (PF) 0.9% PF flush 3 mL, 3 mL, Intracatheter, q1 min prn, Nish Nguyen MD    Current Outpatient Medications:      albuterol (PROAIR HFA/PROVENTIL HFA/VENTOLIN HFA) 108 (90 Base) MCG/ACT inhaler, Inhale 2 puffs into the lungs every 6 hours as needed for shortness of breath / dyspnea or wheezing, Disp: 18 g, Rfl: 0     albuterol (PROVENTIL) (2.5 MG/3ML) 0.083% neb solution, Take 2.5 mg by nebulization every 6 hours as needed for shortness of breath / dyspnea or wheezing, Disp: , Rfl:      alum & mag hydroxide-simethicone (MAALOX MAX) 400-400-40 MG/5ML SUSP suspension, Take 15 mLs by mouth every 6 hours as needed for heartburn or other (sore throat), Disp: 355 mL, Rfl: 0     BANOPHEN 2-0.1 % external cream, Apply 1 applicator topically 2 times daily as needed for itching, Disp: , Rfl:      brexpiprazole (REXULTI) 2 MG tablet, Take 2 mg by mouth every evening, Disp: , Rfl:      busPIRone (BUSPAR) 10 MG tablet, Take 2 tablets (20 mg) by mouth 3 times daily (Patient taking differently: Take 20 mg by mouth 2 times daily), Disp: 180 tablet, Rfl: 0     cetirizine (ZYRTEC) 10 MG tablet, Take 1 tablet (10 mg) by mouth daily (Patient taking differently: Take 10 mg by mouth every evening), Disp: 30 tablet, Rfl: 0     Cholecalciferol (D3 HIGH POTENCY) 25 MCG (1000 UT) CAPS, Take 50 mcg by mouth daily, Disp: , Rfl:      cholestyramine (QUESTRAN) 4 g packet, Take 1 packet (4 g) by mouth 3 times daily (with meals), Disp: 270 packet, Rfl: 3     desvenlafaxine (PRISTIQ) 100 MG 24 hr tablet, Take 1 tablet (100 mg) by mouth daily, Disp: 30 tablet, Rfl: 0     ferrous sulfate (FEROSUL) 325 (65 Fe) MG tablet, Take 1 tablet (325 mg) by mouth daily (with breakfast), Disp: 30 tablet, Rfl: 0     fluocinonide (LIDEX) 0.05 % external cream, Apply 1 Application topically 2 times daily as needed Apply thinly to knee 2 times daily, Monday through  Fridays, off on weekends as needed. Avoid face and skin folds., Disp: , Rfl:      furosemide (LASIX) 20 MG tablet, Take 20 mg by mouth daily, Disp: , Rfl:      hydroxychloroquine (PLAQUENIL) 200 MG tablet, Take 1 tablet (200 mg) by mouth 2 times daily, Disp: 30 tablet, Rfl: 0     ibuprofen (ADVIL/MOTRIN) 600 MG tablet, Take 1 tablet (600 mg) by mouth every 8 hours as needed for moderate pain (Patient not taking: Reported on 2/10/2023), Disp: 24 tablet, Rfl: 0     Lidocaine (LIDOCARE) 4 % Patch, Place 1 patch onto the skin every 24 hours To prevent lidocaine toxicity, patient should be patch free for 12 hrs daily., Disp: 4 patch, Rfl: 0     meclizine (ANTIVERT) 25 MG tablet, Take 1 tablet (25 mg) by mouth every 6 hours as needed for dizziness, Disp: 30 tablet, Rfl: 0     medroxyPROGESTERone (PROVERA) 10 MG tablet, Take 1 tablet (10 mg) by mouth daily, Disp: 10 tablet, Rfl: 0     metFORMIN (GLUCOPHAGE XR) 500 MG 24 hr tablet, Take 1,000 mg by mouth daily (with breakfast), Disp: , Rfl:      montelukast (SINGULAIR) 10 MG tablet, Take 10 mg by mouth every evening, Disp: , Rfl:      OLANZapine (ZYPREXA) 2.5 MG tablet, Take 1.25 mg by mouth daily (with dinner) At 5pm, Disp: , Rfl:      omeprazole (PRILOSEC) 40 MG DR capsule, Take 1 capsule (40 mg) by mouth daily, Disp: 90 capsule, Rfl: 3     ondansetron (ZOFRAN-ODT) 4 MG ODT tab, Take 1 tablet (4 mg) by mouth every 8 hours as needed for nausea, Disp: 15 tablet, Rfl: 0     pregabalin (LYRICA) 100 MG capsule, Take 1 capsule (100 mg) by mouth 3 times daily, Disp: 90 capsule, Rfl: 0     Respiratory Therapy Supplies (NEBULIZER) BRENDAN, Nebulizer device.  Albuterol nebulization every 2 hours as needed for shortness of breath or wheezing., Disp: 1 each, Rfl: 0     Semaglutide 3 MG TABS, Take 3 mg by mouth daily before breakfast Then 7mg daily for second month. Then 14 mg daily, Disp: , Rfl:      SUMAtriptan (IMITREX) 25 MG tablet, Take 25 mg by mouth at onset of headache for  migraine May repeat in 2 hours., Disp: , Rfl:      valACYclovir (VALTREX) 1000 mg tablet, Take 2,000 mg by mouth 2 times daily as needed Take 2 tablets by mouth two times daily for one day. Use as needed at onset of cold sore., Disp: , Rfl:        Allergies: Amoxicillin-pot clavulanate, Hydrocodone-acetaminophen, Latex, Oseltamivir, Penicillins, Vancomycin, Hydrocodone, Blood-group specific substance, Clavulanic acid, Fentanyl, Naltrexone, Other drug allergy (see comments), Oxycodone, Adhesive tape, Band-aid anti-itch, Cephalosporins, and Lamotrigine    Family History:  Family History   Problem Relation Age of Onset     Diabetes Type 2  Maternal Grandmother      Diabetes Type 2  Paternal Grandmother      Breast Cancer Paternal Grandmother      Cerebrovascular Disease Father 53     Myocardial Infarction No family hx of      Coronary Artery Disease Early Onset No family hx of      Ovarian Cancer No family hx of      Colon Cancer No family hx of      Depression Mother      Anxiety Disorder Mother        Social History:  Social History     Socioeconomic History     Marital status: Single     Spouse name: Not on file     Number of children: Not on file     Years of education: Not on file     Highest education level: Not on file   Occupational History     Occupation: On disability   Tobacco Use     Smoking status: Never     Smokeless tobacco: Never   Vaping Use     Vaping Use: Not on file   Substance and Sexual Activity     Alcohol use: No     Alcohol/week: 0.0 standard drinks     Drug use: No     Sexual activity: Not Currently     Partners: Male     Birth control/protection: I.U.D.     Comment: IUD - Mirena - placed July, 2015   Other Topics Concern     Parent/sibling w/ CABG, MI or angioplasty before 65F 55M? Not Asked   Social History Narrative    Single.    Living in independent living portion of [employer].    On disability.    No regular exercise.      Social Determinants of Health     Financial Resource  Strain: Not on file   Food Insecurity: Not on file   Transportation Needs: Not on file   Physical Activity: Not on file   Stress: Not on file   Social Connections: Not on file   Intimate Partner Violence: Not on file   Housing Stability: Not on file       Physical Exam: BP (!) 136/94 (BP Location: Right arm)   Pulse 92   Temp 98  F (36.7  C) (Oral)   Resp 18   Wt 140.6 kg (310 lb)   LMP 12/01/2022   SpO2 98%   BMI 56.70 kg/m      General: No acute distress  Eyes: No scleral icterus or conjunctivitis  Oropharynx: Moist, no ulcers or lesions  Neck/Thyroid: No neck masses or thyromegaly  Pulmonary: Lungs are clear to auscultation bilaterally  Cardiovascular: Regular, rate, rhythm   Gastrointestinal: Abdomen soft nontender  Skin: The patient is not jaundiced. No obvious rashes  Extremities: No pre-tibial edema, no clubbing.   Neurologic: Alert and oriented ×3 , non- focal, grossly intact.    Labs:    CBC RESULTS:   Recent Labs   Lab Test 03/10/23  1532   WBC 8.4   RBC 4.73   HGB 13.7   HCT 42.2   MCV 89   MCH 29.0   MCHC 32.5   RDW 13.3           CMP Results:   Recent Labs   Lab Test 03/10/23  1532      POTASSIUM 3.8   CHLORIDE 101   CO2 31   ANIONGAP 10      BUN 10   CR 0.67   BILITOTAL 0.2   ALKPHOS 80   ALT 29   AST 18        INR Results:   Recent Labs   Lab Test 01/15/23  1625   INR 1.11          Radiology: XR Abdomen 1 View    Result Date: 3/11/2023  EXAM: XR ABDOMEN 1 VIEW LOCATION: [facility] DATE/TIME: 3/11/2023 7:35 PM INDICATION: swallowed foreign body COMPARISON: CT 03/10/2023     IMPRESSION: New linear foreign body measuring approximately 10 cm in length overlying the distal thoracic esophagus, gastroesophageal junction and proximal stomach. No radiographic evidence of bowel obstruction or pneumoperitoneum. Prior cholecystectomy.  Intrauterine device again noted. Bones are unchanged.    CT Abdomen Pelvis w Contrast    Result Date: 3/10/2023  EXAM: CT  ABDOMEN PELVIS W CONTRAST LOCATION: [facility] DATE/TIME: 3/10/2023 3:59 PM INDICATION: widespread abd pain, greatest epigastric and LUQ. hx fb ingestion COMPARISON: 02/28/2023 abdominal radiographs [facility] and 01/05/2023 CT [facility] TECHNIQUE: CT scan of the abdomen and pelvis was performed following injection of IV contrast. Multiplanar reformats were obtained. Dose reduction techniques were used. CONTRAST: Isovue 370 100mL FINDINGS: LOWER CHEST: Visualized lungs are clear. No pleural effusion. Heart size normal with no pericardial effusion. HEPATOBILIARY: Mild diffuse hepatic steatosis. Liver is otherwise normal. No bile duct dilatation. Cholecystectomy. PANCREAS: Normal. SPLEEN: Spleen size normal. ADRENAL GLANDS: Normal. KIDNEYS/BLADDER: Kidneys, ureters and bladder are normal. BOWEL: No foreign body. Appendectomy. Bowel is otherwise normal with no obstruction or inflammatory change. No free air. No free fluid. LYMPH NODES: No lymphadenopathy. VASCULATURE: Normal caliber abdominal aorta.  PELVIC ORGANS: Well-positioned IUD. MUSCULOSKELETAL: Unremarkable.     IMPRESSION: 1.  No acute findings in the abdomen and pelvis. 2.  Incidental findings as detailed above.       Impression: Foreign body ingestion    Recommendation: IMTIAZ Kumar M.D.  Thank you for the opportunity to participate in the care of this patient.   Please feel free to call me with any questions or concerns.  Phone number [phone].

## 2023-03-12 NOTE — ANESTHESIA PREPROCEDURE EVALUATION
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 03/10/2023    AMYLASE 29 02/08/2022    TSH 4.94 (H) 02/11/2022    T4 0.87 02/11/2022    CRP 1.3 (H) 02/18/2023    SED 49 (H) 10/11/2020       Anesthesia Plan    ASA Status:  4, emergent    NPO Status:  ELEVATED Aspiration  Risk/Unknown    Anesthesia Type: General.     - Airway: ETT   Induction: Propofol, Intravenous, RSI.   Maintenance: Balanced.        Consents    Anesthesia Plan(s) and associated risks, benefits, and realistic alternatives discussed. Questions answered and patient/representative(s) expressed understanding.    - Discussed:     - Discussed with:  Patient, Legal Guardian      - Specific Concerns: spoke with guardian by phone, Che OR RN witness.     - Extended Intubation/Ventilatory Support Discussed: No.      - Patient is DNR/DNI Status: No    Use of blood products discussed: No .     Postoperative Care       PONV prophylaxis: Dexamethasone or Solumedrol, Ondansetron (or other 5HT-3)     Comments:    Other Comments: Decadron, Zofran.                Nish Nguyen MD

## 2023-03-12 NOTE — ED NOTES
[facility] ED Mental Health Handoff Note:     Assuming care from: Dr Becca Bee    Brief HPI: 32 year old female signed out to me by the above provider. See initial ED Provider note for full details of the presentation.   In brief, patient had swallowed foreign body which was removed by GI.  Patient does have a history of swallowing things and is at a group home where there is what to monitor for this.  Patient often sneaks things and swallows them.  Patient now status post removal however wishes speak with mental health  regarding medications and control of her anxiety.  DEC assessment is pending.    1:53 AM DEC called back and patient's assessment will be delayed till after 7 AM.  Patient does not wish to stay to wait for this assessment as she mainly wish to talk about getting a second opinion on her medication list.  Patient will be cleared for discharge back to her group home with staff monitoring.  She will be placed for referral for [facility] mental health appointment to discuss medication management in addition I did recommend she speak with her primary psychiatrist regarding medications as well but that care management with follow-up with her to discuss appointment for possible second opinion.  Patient comfortable with plan and will be discharged back to group home.    Home meds reviewed and ordered/administered: Yes  Medically stable for inpatient mental health admission: Yes.  Evaluated by mental health: No. Patient is clinically sober and awaiting evaluation for disposition.  Safety concerns: At the time I received sign out, Patient requires one-to-one sitter due to often swallowing foreign bodies..    Hold Status:  Active Orders   N/A            Labs/Imaging:   Results for orders placed or performed during the hospital encounter of 03/11/23 (from the past 24 hour(s))   XR Abdomen 1 View     Status: None    Collection Time: 03/11/23  7:35 PM    Narrative    EXAM: XR ABDOMEN 1  VIEW  LOCATION: [facility]  DATE/TIME: 3/11/2023 7:35 PM    INDICATION: swallowed foreign body  COMPARISON: CT 03/10/2023      Impression    IMPRESSION: New linear foreign body measuring approximately 10 cm in length overlying the distal thoracic esophagus, gastroesophageal junction and proximal stomach. No radiographic evidence of bowel obstruction or pneumoperitoneum. Prior cholecystectomy.   Intrauterine device again noted. Bones are unchanged.   UPPER GI ENDOSCOPY     Status: None    Collection Time: 03/11/23  9:23 PM   Result Value Ref Range    Upper GI Endoscopy       [facility]  [address] [place] [address]  _______________________________________________________________________________  Patient Name: Nevin Alvarado     Procedure Date: 3/11/2023 9:23 PM  MRN: [id number]                       Account Number: [id number]  YOB: 1990             Admit Type: Emergency Department  Age: 32                               Room: [place] [address]  Note Status: Finalized                Attending MD: SCOTT PIMENTEL MD  Total Sedation Time:                  Instrument Name: EGD Scope 2058  _______________________________________________________________________________     Procedure:             Upper GI endoscopy  Indications:           Foreign body in the stomach  Providers:             SCOTT PIMENTEL MD  Referring MD:            Medicines:             General Anesthesia  Complications:         No immediate complications.  _______________________________________________________________________________  Pr ocedure:             Pre-Anesthesia Assessment:                         - Prior to the procedure, a History and Physical was                          performed, and patient medications and allergies were                          reviewed. The risks and benefits of the procedure and                          the sedation options and risks  were discussed with the                          patient's guardian. All questions were answered and                          informed consent was obtained. Patient identification                          and proposed procedure were verified by the physician,                          the nurse and the anesthesiologist in the                          pre-procedure area in the procedure room. Mental                          Status Examination: alert and oriented. Airway                          Examination: normal oropharyngeal airway and neck                          mobility. Respiratory Examination: clear to                          auscultation. CV Ex amination: regular rate and rhythm.                          Prophylactic Antibiotics: The patient does not require                          prophylactic antibiotics. Prior Anticoagulants: The                          patient has taken no anticoagulant or antiplatelet                          agents. ASA Grade Assessment: IV - A patient with                          severe systemic disease. After reviewing the risks and                          benefits, the patient was deemed in satisfactory                          condition to undergo the procedure. The anesthesia                          plan was to use general anesthesia. Immediately prior                          to administration of medications, the patient was                          re-assessed for adequacy to receive sedatives. The                          physical status of the patient was re-assessed after                          the procedure.                         After obtaining informed consent, the endoscope was                           passed under direct vision. Throughout the procedure,                          the patient's blood pressure, pulse, and oxygen                          saturations were monitored continuously. The endoscope                          was introduced through the  mouth, and advanced to the                          second part of duodenum. The upper GI endoscopy was                          accomplished without difficulty. The patient tolerated                          the procedure well.                                                                                   Findings:       The esophagus was normal.       Twist tie were found in the gastric body. Removal was accomplished with        a snare.       The examined duodenum was normal.                                                                                   Moderate Sedation:       GA  Impression:            - Normal esophagus.                         - Twist tie were found in the stomach. Removal was                           successful.                         - Normal examined duodenum.  Recommendation:        - Return patient to ER for ongoing care.                                                                                     Scott Kumar MD  ______________________  SCOTT KUMAR MD  3/11/2023 10:00:50 PM  I was physically present for the entire viewing portion of the exam.  __________________________  Signature of teaching physician  [email or web address]/[id number] KUMAR MD  Number of Addenda: 0    Note Initiated On: 3/11/2023 9:23 PM  Scope In: 9:49:33 PM  Scope Out: 9:52:07 PM     Glucose by meter     Status: Abnormal    Collection Time: 03/11/23 10:15 PM   Result Value Ref Range    GLUCOSE BY METER POCT 119 (H) 70 - 99 mg/dL   XR Abdomen Upright Only     Status: None    Collection Time: 03/12/23 12:02 AM    Narrative    EXAM: XR ABDOMEN UPRIGHT ONLY  LOCATION: [facility]  DATE/TIME: 3/12/2023 12:02 AM    INDICATION: LUQ pain after removal of foreign body.  COMPARISON: X-ray abdomen single view (2 films) 3/11/2013 at 1935 hours.      Impression    IMPRESSION: Previously seen linear foreign body across the EG junction on prior study has been removed. Cholecystectomy clips.  IUCD. Scattered gas and stool material within normal caliber bowel. Thoracolumbar curve.         ED Meds:  Medications   HYDROmorphone (DILAUDID) tablet 2 mg (2 mg Oral $Given 3/11/23 1958)   ketorolac (TORADOL) injection 15 mg (15 mg Intramuscular $Given 3/11/23 1957)   diphenhydrAMINE (BENADRYL) injection 25 mg (25 mg Intravenous $Given 3/11/23 2224)   diphenhydrAMINE (BENADRYL) injection 25 mg (25 mg Intravenous $Given 3/11/23 2252)     XR Abdomen Upright Only   Final Result   IMPRESSION: Previously seen linear foreign body across the EG junction on prior study has been removed. Cholecystectomy clips. IUCD. Scattered gas and stool material within normal caliber bowel. Thoracolumbar curve.      XR Abdomen 1 View   Final Result   IMPRESSION: New linear foreign body measuring approximately 10 cm in length overlying the distal thoracic esophagus, gastroesophageal junction and proximal stomach. No radiographic evidence of bowel obstruction or pneumoperitoneum. Prior cholecystectomy.    Intrauterine device again noted. Bones are unchanged.          ED Course:     There were no significant events during my shift.    Impression:    ICD-10-CM    1. Swallowed foreign body, initial encounter  T18.9XXA Case Request: ESOPHAGOGASTRODUODENOSCOPY (EGD)     Case Request: ESOPHAGOGASTRODUODENOSCOPY (EGD)      2. Anxiety  F41.9 Adult Mental Health  Referral          Plan:    1. Awaiting mental health evaluation/recommendations.    Campbell Cazares MD  [facility] EMERGENCY ROOM  [address] [id number] [phone]                   Campbell Cazares MD  03/12/23 0154

## 2023-03-12 NOTE — ED NOTES
"Pt has long history of eating disorders and PICA. Pt currently resides in group home setting where she has 1:1 staffing to help prevent pt ingesting foreign bodies. Today around 1730 pt reports she was in the bathroom and was opening a new electric toothbrush. There she saw a twist-tie and states she just \"impulsively swallowed it.\" Pt reports these are strictly impulses and she denies and suicidal ideations and actually denies any intentional self harm from her actions. States she has been working with her doctor to wean off some of her medications. States she is currently weaning off Buspirone. Denies any other medication changes. Pt calm and cooperative at this time. Reports pain to LUQ. Denies nausea, vomiting.   "

## 2023-03-12 NOTE — ANESTHESIA POSTPROCEDURE EVALUATION
Patient: Nevin Alvarado    Procedure: Procedure(s):  ESOPHAGOGASTRODUODENOSCOPY (EGD) WITH FOREIGN BODY REMOVAL       Anesthesia Type:  General    Note:  Disposition: Outpatient   Postop Pain Control: Uneventful            Sign Out: Well controlled pain   PONV: No   Neuro/Psych: Uneventful            Sign Out: Acceptable/Baseline neuro status   Airway/Respiratory: Uneventful            Sign Out: Acceptable/Baseline resp. status   CV/Hemodynamics: Uneventful            Sign Out: Acceptable CV status; No obvious hypovolemia; No obvious fluid overload   Other NRE:    DID A NON-ROUTINE EVENT OCCUR? No           Last vitals:  Vitals Value Taken Time   /74 03/11/23 2220   Temp 36.1  C (97  F) 03/11/23 2220   Pulse 101 03/11/23 2230   Resp 27 03/11/23 2230   SpO2 94 % 03/11/23 2230       Electronically Signed By: Nish Nguyen MD  March 11, 2023  10:45 PM

## 2023-03-12 NOTE — CONSULTS
I have reviewed the surgical /preoperative H&P that is linked to this encounter, and examined the patient. There are no significant changes.    Cruz Kumar MD

## 2023-03-12 NOTE — ED NOTES
Pt updated on new ETA of DEC at 0600. Pt states she no longer wishes to stay and wait to speak with DEC. Pt states they only reason she wanted to speak with DEC is to get a second opinion on her medication management. Updated Dr. Cazares on pt's wishes.

## 2023-03-12 NOTE — ANESTHESIA PROCEDURE NOTES
Airway       Patient location during procedure: OR       Procedure Start/Stop Times: 3/11/2023 9:46 PM  Staff -        CRNA: Estrada Starkey APRN CRNA       Performed By: CRNA  Consent for Airway        Urgency: emergent  Indications and Patient Condition       Indications for airway management: isma-procedural       Induction type:RSI       Mask difficulty assessment: 1 - vent by mask    Final Airway Details       Final airway type: endotracheal airway       Successful airway: ETT - single  Endotracheal Airway Details        ETT size (mm): 7.5       Successful intubation technique: video laryngoscopy       VL Blade Size: Glidescope 4       Grade View of Cords: 1       Adjucts: stylet       Position: Right       Measured from: gums/teeth       Secured at (cm): 24       Bite block used: None    Post intubation assessment        Placement verified by: capnometry, equal breath sounds and chest rise        Number of attempts at approach: 1       Number of other approaches attempted: 0       Secured with: silk tape       Ease of procedure: easy       Dentition: Intact and Unchanged    Medication(s) Administered   Medication Administration Time: 3/11/2023 9:46 PM

## 2023-03-28 NOTE — PROGRESS NOTES
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 each, Rfl: 0     Semaglutide 3 MG TABS, Take 3 mg by mouth daily before breakfast Then 7mg daily for second month. Then 14 mg daily, Disp: , Rfl:      SUMAtriptan (IMITREX) 25 MG tablet, Take 25 mg by mouth at onset of headache for migraine May repeat in 2 hours., Disp: , Rfl:       valACYclovir (VALTREX) 1000 mg tablet, Take 2,000 mg by mouth 2 times daily as needed Take 2 tablets by mouth two times daily for one day. Use as needed at onset of cold sore., Disp: , Rfl:     ALLERGIES: Amoxicillin-pot clavulanate, Hydrocodone-acetaminophen, Latex, Oseltamivir, Penicillins, Vancomycin, Hydrocodone, Blood-group specific substance, Clavulanic acid, Fentanyl, Naltrexone, Other drug allergy (see comments), Oxycodone, Adhesive tape, Band-aid anti-itch, Cephalosporins, and Lamotrigine    SOCIAL HISTORY:    Social History     Socioeconomic History     Marital status: Single     Spouse name: Not on file     Number of children: Not on file     Years of education: Not on file     Highest education level: Not on file   Occupational History     Occupation: On disability   Tobacco Use     Smoking status: Never     Smokeless tobacco: Never   Vaping Use     Vaping Use: Not on file   Substance and Sexual Activity     Alcohol use: No     Alcohol/week: 0.0 standard drinks     Drug use: No     Sexual activity: Not Currently     Partners: Male     Birth control/protection: I.U.D.     Comment: IUD - Mirena - placed July, 2015   Other Topics Concern     Parent/sibling w/ CABG, MI or angioplasty before 65F 55M? Not Asked   Social History Narrative    Single.    Living in independent living portion of [facility].    On disability.    No regular exercise.      Social Determinants of Health     Financial Resource Strain: Not on file   Food Insecurity: Not on file   Transportation Needs: Not on file   Physical Activity: Not on file   Stress: Not on file   Social Connections: Not on file   Intimate Partner Violence: Not on file   Housing Stability: Not on file         FAMILY HISTORY:   Family History   Problem Relation Age of Onset     Diabetes Type 2  Maternal Grandmother      Diabetes Type 2  Paternal Grandmother      Breast Cancer Paternal Grandmother      Cerebrovascular Disease Father 53     Myocardial  Infarction No family hx of      Coronary Artery Disease Early Onset No family hx of      Ovarian Cancer No family hx of      Colon Cancer No family hx of      Depression Mother      Anxiety Disorder Mother       Non-contributory for problems with anesthesia    REVIEW OF SYSTEMS:   The patient was asked a 14 point review of systems regarding constitutional symptoms, eye symptoms, ears, nose, mouth, throat symptoms, cardiovascular symptoms, respiratory symptoms, gastrointestinal symptoms, genitourinary symptoms, musculoskeletal symptoms, integumentary symptoms, neurological symptoms, psychiatric symptoms, endocrine symptoms, hematologic/lymphatic symptoms, and allergic/ immunologic symptoms.   The pertinent factors have been included in the HPI and below.  Patient Supplied Answers to Review of Systems  [facility] ROS 10/24/2022   Constitutional: Weight gain, Problems with sleep   Neurology: Dizzy spells, Numbness, Unexplained weakness, Headache   Psychology: Frequently feeling anxious   Ears, Nose, Throat: Ringing/noise in ears, Nasal congestion or drainage, Sore throat, Coughing up blood   Gastrointestinal/Genitourinary: Unexplained nausea/vomiting, Diarrhea   Musculoskeletal: Sore or stiff joints, Swollen joints, Back pain, Neck pain, Swollen legs/feet   Allergy/Immunology: Skin changes   Hematologic: Easy bruising, Lymph node swelling   Endocrine: Thirst, Heat or cold intolerance, Frequent urination       Physical Examination   The patient underwent a physical examination as described below. The pertinent positive and negative findings are summarized after the description of the examination.  Constitutional: The patient's developmental and nutritional status was assessed. The patient's voice quality was assessed.  Head and Face: The head and face were inspected for deformities. The sinuses were palpated. The salivary glands were palpated. Facial muscle strength was assessed bilaterally.  Eyes: Extraocular movements and  primary gaze alignment were assessed.  Ears, Nose, Mouth and Throat: The ears and nose were examined for deformities. The nasal septum, mucosa, and turbinates were inspected by anterior rhinoscopy. The lips, teeth, and gums were examined for abnormalities. The oral mucosa, tongue, palate, tonsils, lateral and posterior pharynx were inspected for the presence of asymmetry or mucosal lesions.    Neck: The tracheal position was noted, and the neck mass palpated to determine if there were any asymmetries, abnormal neck masses, thyromegally, or thyroid nodules.  Respiratory: The nature of the breathing and chest expansion/symmetry was observed.  Cardiovascular: The patient was examined to determine the presence of any edema or jugular venous distension.  Abdomen: The contour of the abdomen was noted.  Lymphatic: The patient was examined for infraclavicular lymphadenopathy.  Musculoskeletal: The patient was inspected for the presence of skeletal deformities.  Extremities: The extremities were examined for any clubbing or cyanosis.  Skin: The skin was examined for inflammatory or neoplastic conditions.  Neurologic: The patient's orientation, mood, and affect were noted. The cranial nerve  functions were examined.  Other pertinent positive and negative findings on physical examination:   OC/OP: no lesions, uvula midline, soft palate elevates symmetrically   Neck: no lesions, left TH tenderness to palpation    All other physical examination findings were within normal limits and noncontributory.    Procedures   Video Laryngoscopy with Stroboscopy (CPT 88776)    Preoperative Diagnosis:  Dysphonia and throat symptoms  Postoperative Diagnosis: Dysphonia and throat symptoms  Indication:  Patient has new or persistent dysphonia and throat symptoms.  This requires evaluation by stroboscopy to fully delineate the laryngeal functioning; especially mucosal wave assessment, ultrasharp visualization of lesions on the vocal folds, and  overall functioning of the larynx.  Details of Procedure: A 70 degree rigid telescopic laryngoscope or a distal chip flexible scope was used. The lighting was obtained via a light cable connected to a stroboscopic unit. The telescope was inserted either transorally or transnasally until the vocal folds could be visualized. The patient was instructed to sustain the vowel  ee  at a comfortable pitch and loudness as the voice was monitored by a microphone connected to a fundamental frequency tracker. This circuit tracked vocal periodicity, allowing the light to flash in synchrony with the glottal cycles. A setting on the stroboscope was set to change the phase of light strobing with relation to the vocal fundamental frequency, producing an image of 1 to 2 glottal cycles every second. The video images were recorded for analysis. Use of the variable speed, slow and stop scan allowed careful study of pertinent segments of laryngeal function to increase accuracy of clinical assessments of function and dysfunction.  In particular, features of glottal closure, mucosal wave on the vocal fold cover and laryngeal symmetry were analyzed. Lastly, the patient was asked to phonate speech samples and auditory/perceptual evaluation of voice and resonance were performed.  The vocal quality was carefully evaluated for hoarseness, breathiness, loudness, phrase length and intelligibility to determine the source of dysphonia.    Findings:      B. LARYNGOVIDEOSTROBOSCOPY   Anatomic/Physiological Deviations:  RNC, bilateral mild restriction in mucosal wave, left vocal fold paralysis, arytenoid hooding with deep inspiration, septal perforation with copious crusting   Mucosal wave: Right:  Little restriction     Left: Little restriction  Bilateral Vocal Fold Vibration: Symmetric  Vocal Process: Right: No restriction    Left:  Marked restriction  Vocal Fold closure: Complete glottal closure    Complication(s): None  Disposition: Patient  "tolerated the procedure well                   Review of Relevant Clinical Data   I personally reviewed:  Notes:   ED 3/11/23  FINAL IMPRESSION  1. Swallowed foreign body, initial encounter   2. Anxiety         MEDICAL DECISION MAKING   Pertinent Labs & Imaging studies reviewed. (See chart for details)     Nevin Alvarado is a 32 year old female who presents for evaluation of a swallowed foreign body and anxiety.  Records reviewed.  Patient has a long well-documented history of swallowing foreign bodies and mental health diagnoses.  She has been seen multiple times in this ED as well as various hospitals around the [facility].  She does have a care plan in place which I reviewed.  In summary, it is recommended that ED providers avoid CT scans if possible and instead, proceed with x-ray of abdomen to assess for foreign bodies, be due to issues with IV pain medicine given history of chronic abdominal pain.  Patient was most recently seen here in the ED yesterday for evaluation of left upper quadrant abdominal pain.  She states that she felt better when she left, specifically after receiving IV Dilaudid.  This afternoon, she had relatively sudden onset of anxiety and reports that she swallowed a zip tie made of wire and plastic.  This occurred earlier this afternoon and since, she has developed some upper abdominal discomfort.  She believes that she was triggered to swallow this object after being seen here yesterday, states that once she is back in the medical system she starts to feel more anxiety.  She did attempt to use her skills but did not find them to be useful.  In addition to the swallowed foreign body, patient also was requesting to meet with a psychiatrist or mental health professional.  She reports that the psychiatrist she has been working with for the past 5 years is attempting to wean her off of her chronic psych medications and she would like a \"second opinion.\"  She has also been seeing a " therapist for the last 6 months and is working on DBT with this provider.  She has found it to be somewhat useful thus far.  Patient has no other new complaints, either physical or mental. Remainder of history and exam, as below.      I considered a broad differential including but not limited to swallowed foreign body, perforation, peritonitis, obstruction, exacerbation of known psychiatric disorder, secondary gain.  Discussed options for work-up and management with the patient.  We agreed on plan to start with x-ray of the abdomen and management of discomfort with p.o. Dilaudid and IM Toradol.  If at all possible, would like to avoid placing an IV and giving IV analgesics but if GI needs to intervene, we would place an IV for procedures.  At this point, I do not see an indication for laboratory work-up.  Patient would like to meet with a mental health professional so we will have her speak with DEC once medically cleared.     XR abdomen revealed a new linear foreign body measuring approximately 10 cm in length overlying the distal thoracic esophagus, gastroesophageal junction and proximal stomach. No radiographic evidence of bowel obstruction or pneumoperitoneum. Will plan to review with GI.     I discussed the case with Dr. Kumar with [facility] who recommended endoscopic removal under sedation but agreed with plan to hold on giving IV opiates.  He graciously called in his team and anesthesiology and patient was taken to the OR for this procedure.  While there, group home staff was consented for procedure and voiced some concern about her recurrent ingestions and requested a 72-hour hold.  Patient was hoping to meet with mental health professional anyway and given this is a chronic problem, I am not confident that she would meet criteria for a hold if she requests to leave.     Patient was transported back to the ED after recovering in the PACU.  She did have some itchiness and was given dose of Benadryl  with relief.  Patient then attempted to sip on some water but reported she began to experience severe left upper quadrant pain.  On exam, she did have mild tenderness to palpation in this area.  Although I feel perforation or complication from procedure is very unlikely, we agreed on plan to obtain a repeat x-ray.  We will also continue with plan to have her speak with DEC.  Patient is currently here voluntarily and would defer to mental health team regarding recommendations for ongoing mental health care.  As this is a chronic problem, she is not voicing any active SI or thoughts of self-harm, do not feel she would like to meet criteria for hold.     Patient was signed out to [facility] team pending evaluation by DEC and results of x-ray.    ED 3/10/23  MEDICAL DECISION MAKING:    Pertinent Labs & Imaging studies reviewed. (See chart for details)  32 year old female with a h/o frequent foreign body ingestion requiring EGD for retrieval ~2x monthly, chronic abdominal pain, GERD, borderline personality disorder, intentional overdose  presents to the Emergency Department for evaluation of LUQ abdominal pain which wraps around like a band to the back x 2 days.  On exam she is alert, flat affect, uncomfortable appearing and bracing the upper left quadrant.  Abdomen is soft, tender at the left upper quadrant.  No CVA tenderness.  No overlying skin rash or skin breakdown.     Differential diagnosis includes GERD, PUD, cholelithiasis, ascending cholangitis, pancreatitis, foreign body ingestion, constipation, gastroenteritis, ureterolithiasis, pyelonephritis, UTI, pregnancy, diaphragmatic irritation, lower lobe pneumonia, zoster.  CBC, BMP, LFTs, lipase all WNL.  Urine was without evidence of infection.  CT abdomen was obtained and this is negative for any acute abnormality.  She was given 1 mg of Dilaudid, GI cocktail and famotidine with minor relief.  Added lidocaine patch.  She is reassured by the benign work-up,  discussed supportive cares at home to include stretching, warm showers, continued ibuprofen/tyelnol and close monitoring of pain.     There is no evidence of acute or emergent process requiring intervention at this time. Pt is appropriate for outpatient management. Provisional nature of today's diagnosis was discussed and strict return precautions were given. Pt expressed understanding and She was discharged to home in good condition.     GI Dr. Ulloa 1/30/23  Assessment:    Nevin Alvarado is a 32 year old female with morbid obesity, PTDS, esophageal perforation 2019, vocal cord paralysis, cholecystectomy, diarrhea, frequent foreign body ingestion who is presenting as a new patient in consultation at the request of Dr. Simons with a chief complaint of dysphagia, acid reflux.     The patient was seen in video telemedecine consultation regarding her dysphagia and acid reflux. She is not currently on a PPI and denies having tried one in the past. I have started her on omeprazole 40mg daily 30-60 minutes before breakfast as this may resolve the acid reflux and dysphagia if that is related to reflux. Because of repeated trauma from swallowing foreign objects and prior perforation the patient may ave some degree of stricturing also contributing. Depending on the results from endoscopy she may benefit from EGD with dilation. This will only be performed in the absence of a foreign body. Lastly, the patient commented on having diarrhea since undergoing cholecystectomy. I have prescribed cholestyramine 4g TID with meals. She will follow up in 2 months to reassess her symptoms.     1. Gastroesophageal reflux disease, unspecified whether esophagitis present   2. Esophageal dysphagia   3. Diarrhea, unspecified type   4. Swallowed foreign body, sequela   5. Esophageal perforation   6. Morbid obesity with BMI of 40.0-44.9, adult (H)      No orders of the defined types were placed in this encounter.     Plan:  1. Please begin  taking omeprazole 40mg once daily 30-60 minutes before breakfast.   2. Please take cholestyramine 4g (one packet) three times a day with meals  3. Follow up in 2 months to reassess symptoms  4. You may benefit from esophageal dilation in the future.     Alma Oliva 11/4/22  Clinical Impression Comments Pt presents with functional oropharyngeal swallow responses.  Oral mech examination significant for flat nasolabial fold on R and L lingual deviation upon protrousion.  Omnipaque contrast initially used due to concern for possible esophageal perforation in the setting of ED visit previous date; there was no leak visualized upon esophageal sweep in AP view and the remainder of the study was completed with barium.  Trials under flouroscopy revealed premature pharyngeal entry but no penetration or aspiration.  No pharyngeal residue observed.  Solid textures were challenge items from home including grapes and a sandwich.  Pt denied globus throughout the evaluation.  A full esophagram was completed following the VFSS.  At this time, pt is appopriate for a regular texture diet and thin liquids.  No compensatory strategies were indicated.  Further ST for dysphagia is not indicated; however, it is recommeded that pt continue with SLP for voice and follow up with GI service.  Pt was educated on these results and recommendations.  Pt reported understanding and agreement.    Radiology:   CT neck also reviewed with neurorads - no lesion along RLN     Video Esophagram Review Rounds  Imaging Review of MBSS conducted with attending physician Chrissy Simons and reviewed/discussed with SLP Judith Pryor, Alma Oliva, Mayela Alvarado, and/or Yelitza Farrar     Relevant Background:     Esophagram: 11/4/22  1. No penetration or aspiration. See same day speech pathology note for additional details regarding swallow portion of the exam.  2. No stricture, filling defect, or definite hiatal hernia.  3. Limited esophageal motility  evaluation due to impaired patient mobility, though the esophagus was patulous with some evidence of dysmotility.  4. Dense barium limits mucosal evaluation of the distal esophagus on double contrast portion. No obvious mucosal abnormality within the upstream esophagus.     MBSS Date: 11/4/22     Findings:  Pharyngeal Weakness:No  Epiglottic dysfunction: No  Penetration: No  Aspiration: No  UES dysfunction: No  Details: safe swallow        Recommendations:  Diet: current      CT soft tissue neck 2/18/23  IMPRESSION:   1.  No radiodense metallic foreign body.  2.  Medialization of the right vocal cord with dilatation of the right laryngeal ventricle. Findings can be seen with vocal cord paralysis. Consider correlation with direct visualization.    CT chest 2/18/23  IMPRESSION:   1.  Negative chest CT.    XR chest 2/16/23  IMPRESSION: Negative chest. Metallic foreign body no longer present.    XR neck soft tissue 2/16/23  IMPRESSION:  Frontal radiograph is grossly unremarkable. No radiopaque foreign body identified. Lateral radiograph is markedly overpenetrated, essentially nondiagnostic. Consider repeat lateral radiograph if clinically warranted    Procedures:   EGD 3/11/23  Impression:              - Normal esophagus.   - Twist tie were found in the stomach. Removal was successful.   - Normal examined duodenum.    Labs:  Lab Results   Component Value Date    TSH 4.94 (H) 02/11/2022     Lab Results   Component Value Date     03/10/2023    CO2 31 03/10/2023    BUN 10 03/10/2023    CREAT 0.6 08/31/2020    PHOS 3.5 12/01/2019     Lab Results   Component Value Date    WBC 8.4 03/10/2023    HGB 13.7 03/10/2023    HCT 42.2 03/10/2023    MCV 89 03/10/2023     03/10/2023     Lab Results   Component Value Date    PTT 24 01/15/2023    INR 1.11 01/15/2023     No results found for: JULIA  No components found for: RHEUMATOIDFACTOR,  RF  Lab Results   Component Value Date    CRP 1.3 (H) 02/18/2023    CRP 26.0 (H)  "10/31/2020    CRP 27.0 (H) 10/30/2020    CRP 23.0 (H) 10/11/2020    CRP 22.0 (H) 09/05/2020     No components found for: CKTOT, URICACID  No components found for: C3, C4, DSDNAAB, NDNAABIFA  No results found for: MPOAB    Patient reported Quality of Life (QOL) Measures   Patient Supplied Answers To VHI Questionnaire  Voice Handicap Index (VHI-10) 10/24/2022   My voice makes it difficult for people to hear me 3   People have difficulty understanding me in a noisy room 3   My voice difficulties restrict my personal and social life.  2   I feel left out of conversations because of my voice 2   My voice problem causes me to lose income 0   I feel as though I have to strain to produce voice 3   The clarity of my voice is unpredictable 3   My voice problem upsets me 2   My voice makes me feel handicapped 2   People ask, \"What's wrong with your voice?\" 3   VHI-10 23         Patient Supplied Answers To EAT Questionnaire  Eating Assessment Tool (EAT-10) 10/24/2022   My swallowing problem has caused me to lose weight 0   My swallowing problem interferes with my ability to go out for meals 2   Swallowing liquids takes extra effort 0   Swallowing solids takes extra effort 2   Swallowing pills takes extra effort 2   Swallowing is painful 2   The pleasure of eating is affected by my swallowing 2   When I swallow food sticks in my throat 3   I cough when I eat 2   Swallowing is stressful 3   EAT-10 18         Patient Supplied Answers To CSI Questionnaire  Cough Severity Index (CSI) 10/24/2022   My cough is worse when I lie down 2   My coughing problem causes me to restrict my personal and social life 2   I tend to avoid places because of my cough problem 2   I feel embarrassed because of my coughing problem 2   People ask, ''What's wrong?'' because I cough a lot 2   I run out of air when I cough 3   My coughing problem affects my voice 3   My coughing problem limits my physical activity 2   My coughing problem upsets me 2   People " ask me if I am sick because I cough a lot 2   CSI Score 22         @[email or web address]@    Impression & Plan     IMPRESSION: Ms. Alvarado is a 32 year old female who is being seen for the followin. Dysphonia   - since May  - after URI  - voice was gone for 3 weeks  - now better but not normal  - both singing and speaking voice  - seen in the hospital on  with vocal fold paralysis on the left  - has complex history of multiple EGDs for foreign body ingestion, had esophageal perf in  which required endoscopic procedures, no open procedures  - has coughing and vomiting as well  - had CT neck 2022 - no obvious lesions  - scope shows left vocal fold paralysis, postcricoid edema, right>left infraglottic prominence/granuloma   - discussed will review CT neck with neurorads - if clear then this is idiopathic vocal fold paralysis vs post-surgical after her perforation in , she can't remember if voice changes happened at that time  - discussed if this is idiopathic after her cold in May - it takes 1 year to allow for full recovery  - voice therapy for optimization both for vocal fold paralysis, as well as for irritable larynx syndrome and for vocal process granuloma/prominence   - did voice therapy in [place] without benefit   - symptoms 3/31/23 voice is hoarse in the morning or after not talking for a while, improves throughout the day and no pain with talking or singing, voice cuts out with singing but has improved    - did do therapy with Anjali, did not find benefit  - scope shows bilateral mild restriction in mucosal wave, left vocal fold paralysis, arytenoid hooding with deep inspiration, septal perforation with copious crusting    - discussed voice therapy, if voice worsens or becomes painful could consider procedural intervention but not significantly bothersome now   Plan  - observation- repeat evaluation in  - 1 year out since voice change onset      2. Dysphagia  - in the setting of complex  history of multiple EGDs for foreign body ingestion, had esophageal perf in 2019 which required endoscopic procedures, no open procedures  - feels foods like bread and meat get stuck  - no recent swallow evaluations  - just had a foreign body requiring EGD last night  - coughing up blood at times - likely esophageal   - does have nasal crusting today - so discussed vaseline for that   - discussed having GI follow up and swallow evaluation  - reviewed Xray Video Swallow Exam 11/4/22 at swallow rounds - safe swallow   - symptoms 3/31/23 has some discomfort with swallowing but no sharp pain with swallowing saliva   Plan  - continue follow-up with Dr. Ulloa       3. Septal perforation  - unsure how this started   - very painful with no associated intranasal substance use   - scope shows bilateral mild restriction in mucosal wave, left vocal fold paralysis, arytenoid hooding with deep inspiration, septal perforation with copious crusting    - discussed Neti pot and Vaseline/aquaphor to clear crusting  - discussed consulting with rhinology team  - discussed breathing therapy, will start with medical management for hydration of perforation  Plan   - start Neti pot and Vaseline/aquaphor   - consult with Dr. Apple, will provide a referral         RETURN VISIT: June 2023    Scribe Disclosure:  IMegan am serving as a scribe to document services personally performed by Chrissy Simons MD at this visit, based upon the provider's statements to me. All documentation has been reviewed by the aforementioned provider prior to being entered into the official medical record.     Chrissy Simons MD    Laryngology    [facility]  Department of  Otolaryngology - Head and Neck Surgery  Clinics [place] [address]  Appointment line: [phone]  Fax: [phone]  [email or web address]

## 2023-03-31 ENCOUNTER — OFFICE VISIT (OUTPATIENT)
Dept: OTOLARYNGOLOGY | Facility: CLINIC | Age: 33
End: 2023-03-31
Payer: COMMERCIAL

## 2023-03-31 VITALS
HEART RATE: 104 BPM | DIASTOLIC BLOOD PRESSURE: 79 MMHG | SYSTOLIC BLOOD PRESSURE: 134 MMHG | BODY MASS INDEX: 53.92 KG/M2 | HEIGHT: 62 IN | OXYGEN SATURATION: 95 % | WEIGHT: 293 LBS

## 2023-03-31 DIAGNOSIS — K22.3 ESOPHAGEAL PERFORATION: Primary | ICD-10-CM

## 2023-03-31 DIAGNOSIS — J34.89 NASAL SEPTAL PERFORATION: Primary | ICD-10-CM

## 2023-03-31 DIAGNOSIS — R49.0 DYSPHONIA: ICD-10-CM

## 2023-03-31 DIAGNOSIS — R06.00 DYSPNEA, UNSPECIFIED TYPE: ICD-10-CM

## 2023-03-31 DIAGNOSIS — J38.01 VOCAL FOLD PARALYSIS, LEFT: ICD-10-CM

## 2023-03-31 DIAGNOSIS — R49.0 DYSPHONIA: Primary | ICD-10-CM

## 2023-03-31 PROCEDURE — 31579 LARYNGOSCOPY TELESCOPIC: CPT | Performed by: OTOLARYNGOLOGY

## 2023-03-31 PROCEDURE — 92524 BEHAVRAL QUALIT ANALYS VOICE: CPT | Mod: GN | Performed by: SPEECH-LANGUAGE PATHOLOGIST

## 2023-03-31 PROCEDURE — 99214 OFFICE O/P EST MOD 30 MIN: CPT | Mod: 25 | Performed by: OTOLARYNGOLOGY

## 2023-03-31 ASSESSMENT — PAIN SCALES - GENERAL: PAINLEVEL: NO PAIN (0)

## 2023-03-31 NOTE — PROGRESS NOTES
"[facility]  Clinical Voice and Upper Airway Evaluation Report    Patient's Name: Nevin Alvarado  Date of Evaluation: 3/31/2023  Providing SLP: Norma Argueta MS CCC-SLP  Seen in Conjunction With: Chrissy Simons MD  Insurance Coverage: [employer]  Chief Complaint: Dysphonia  Assessment / Treatment Time: 20 minutes  Evaluation Location: [facility]  Others in Attendance for the Evaluation: staff member from her facility      Patient History: Nevin is a 32-year-old female who presents today for evaluation of dysphonia.     Dysphonia:     Onset: May 2022    Progression: gradually improving    Concerns  o Vocal fatigue  o Hard time with singing  o Inconsistent quality  o Voice doesn't really bother her as much as previously before  o Denies discomfort    Previous voice therapy or other interventions: voice therapy in [place] with Anjali Kim and didn't think this was helpful and didn't like the exercises    Dyspnea:     Onset: increased around May 2022    Pulmonary conditions: asthma, ZOHRA    Inhaled treatments: albuterol inhaler not especially helpful    Concerns: more difficulties getting air in    Dysphagia: intermittent difficulties    GERD symptoms: frequent vomiting    Cough / Throat Clearing: N/A    Throat Pain: N/A      Quality of Life Questionnaires:    Patient Supplied Answers To Last 2 VHI Questionnaires  Voice Handicap Index (VHI-10) 10/24/2022   My voice makes it difficult for people to hear me 3   People have difficulty understanding me in a noisy room 3   My voice difficulties restrict my personal and social life.  2   I feel left out of conversations because of my voice 2   My voice problem causes me to lose income 0   I feel as though I have to strain to produce voice 3   The clarity of my voice is unpredictable 3   My voice problem upsets me 2   My voice makes me feel handicapped 2   People ask, \"What's wrong with your voice?\" 3   VHI-10 23     Patient " "Supplied Answers To Last 2 CSI Questionnaires  Cough Severity Index (CSI) 10/24/2022   My cough is worse when I lie down 2   My coughing problem causes me to restrict my personal and social life 2   I tend to avoid places because of my cough problem 2   I feel embarrassed because of my coughing problem 2   People ask, ''What's wrong?'' because I cough a lot 2   I run out of air when I cough 3   My coughing problem affects my voice 3   My coughing problem limits my physical activity 2   My coughing problem upsets me 2   People ask me if I am sick because I cough a lot 2   CSI Score 22     Patient Supplied Answers To Last 2 EAT Questionnaires  Eating Assessment Tool (EAT-10) 10/24/2022   My swallowing problem has caused me to lose weight 0   My swallowing problem interferes with my ability to go out for meals 2   Swallowing liquids takes extra effort 0   Swallowing solids takes extra effort 2   Swallowing pills takes extra effort 2   Swallowing is painful 2   The pleasure of eating is affected by my swallowing 2   When I swallow food sticks in my throat 3   I cough when I eat 2   Swallowing is stressful 3   EAT-10 18       Perceptual Analysis (52215): Evaluation of Voice / Speech / Non-Communicative Laryngeal Behaviors    The GRBAS is a perceptual rating of voice change. 0 indicated no impairment, 3 indicates a severe impairment. \"C\" and \"I\" may be used to signify if these feature was observed consistently or intermittently respectively. This is a rating based on clinical judgement of disordered voice quality.  G ( 0-1 - C ) General Dysphonia     R ( 0 ) Roughness     B ( 0 ) Breathiness     A ( 0 ) Asthenia     S ( 0-1 ) Strain    *Validity of this measure may be slightly reduced when performed via acoustic signal from virtual visit*    Laryngeal palpation:     Thyrohyoid space: L sided tenderness    Additional observations:     Coughing / throat clearing: not observed    Breathing patterns: clavicular    Overt " "tension: increased in the upper chest during inhalation    Habitual pitch: WNL for age- and gender-matched peers    Pitch range: \" \"    Maximum phonation time at normal pitch and loudness on /a/ vowel: not assessed today    Resonance: mid-oral focused    Loudness: appropriate      Laryngoscopy with stroboscopy:    Provider performing exam: Chrissy Simons MD    Informed consent: Informed verbal consent was obtained, which includes potential side-effects, risks, and benefits of the procedure.     Anesthetic: Topical anesthesia with 3% lidocaine and 0.25% phenylephrine was applied the nostrils bilaterally. Viscous lidocaine 4% was applied to the tip of the endoscope.    Scope type: A distal chip flexible laryngoscope was passed through the nare with halogen and xenon light source(s).     The laryngeal and pharyngeal structures were evaluated for gross appearance, mobility, function, and focal lesions / abnormalities of the associated mucosa.    Velar Function: complete closure without bubbling of secretions and no weakness observed     General Appearance:  o Significant crusting bleeding and secretions in the nasal cavity with structural abnormalities  o Intermittent UES opening noted spontaneously and following harsh throat clearing    Secretions: WNL     Subglottis Appearance: visible portion is patent     R Arytenoid Abduction / Adduction:   o Mildly restricted abduction  o R arytenoid complex adducts anteriorly and beyond midline compared to L  o Bilateral arytenoid hooding noted during some deep inhales    L Arytenoid Abduction / Adduction:  o Immobile from a paramedian position  o Bilateral arytenoid hooding noted during some deep inhales    Mediolateral Compression: mild with phonation    Anteroposterior Compression: WNL     Vocal Fold Elongation: appropriate     Left (L) Vocal Fold Edge and Mucosa: white with smooth and straight appearance     Right (R) Vocal Fold Edge: \" \"     Narrow Band Imaging: demonstrates " "similar findings as halogen light     Glottic Closure: complete     Phase Closure: predominantly open phase     Vertical Level of Approximation: true vocal fold appear to adduct at the same height     L Amplitude: mildly reduced at modal pitch    R Amplitude: WNL    L Mucosal Wave: mildly reduced at modal pitch    R Mucosal Wave: WNL     Phase Symmetry: mildly asymmetrical due to reduced pliability of the L vocal fold    Periodicity: periodic     Inhalation Phonation: similar findings to exhalation phonation     Therapeutic Probes:   o Humming resulted in similar findings compared with other phonatory tasks  o Flow/high airflow stimuli resulted in \" \"  o Rescue breathing techniques utilizing brisk, nasal inhalation and pursed lip inspiration with prolonged, high pressure exhalation revealed some increased abduction with pursed lip breathing and worsening of arytenoid hooding with nasal inhalation  o Patient was cued to perform more gentle throat clears/coughs, as she frequently performed very harsh throat clearing       Impressions and Plan:     Nevin is a 32-year-old female presenting today with dysphonia (R49.0) and dyspnea (R06.00) secondary to L vocal fold paralysis (J38.01). Perceptually, her voice is mildly strained during phonation. Laryngoscopy today demonstrated a significant structural abnormality with crusting of blood and secretions in the nasal cavity, as well as continued L vocal fold paralysis at midline. She is able to achieve complete glottic closure at this time. Increased prolapsing of the arytenoid cartilages was noted during inspiration today, particular nasal inhalations, likely due to increased turbulence created by her nasal abnormality, as well as L vocal fold paralysis and some reduced abduction of the R vocal fold. At this time, it is recommended that she treat her nasal abnormality at this time, and Dr. Simons will facilitate a rhinology referral. We will check on larynx structure with " breathing following this treatment and possible consider vocal breathing therapy if stimulability is improved. With regard to her voice, Nevin has been doing rather well and was not interested in additional voice therapy at this time. During her follow-up in 2023, we will discuss possible surgical options (e.g. vocal fold augmentation) if she is not happy with her voice quality and function. We discussed the importance of voice therapy perioperatively if she were to undergo a procedure. Nevin is in agreement with this plan of care.       Billed Procedures:      Perceptual voice assessment 00127      Thank you for allowing me to participate in this patient's care,    Norma Argueta MS CCC-SLP  Speech-Language Pathologist  [facility]  Department of Otolaryngology - Head and Neck Surgery  [facility]  [email or web address]  Direct: [phone]  [phone]    *This note may have been completed using [id number] dictation software, so errors may exist. Please contact me for clarification if needed*

## 2023-03-31 NOTE — LETTER
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 (100 mg) by mouth 3 times daily, Disp: 90 capsule, Rfl: 0     Respiratory Therapy Supplies (NEBULIZER) BRENDAN  Nebulizer device.  Albuterol nebulization every 2 hours as needed for shortness of breath or wheezing., Disp: 1 each, Rfl: 0     Semaglutide 3 MG TABS, Take 3 mg by mouth daily before breakfast Then 7mg daily for second month. Then 14 mg daily, Disp: , Rfl:      SUMAtriptan (IMITREX) 25 MG tablet, Take 25 mg by mouth at onset of headache for migraine May repeat in 2 hours., Disp: , Rfl:      valACYclovir (VALTREX) 1000 mg tablet, Take 2,000 mg by mouth 2 times daily as needed Take 2 tablets by mouth two times daily for one day. Use as needed at onset of cold sore., Disp: , Rfl:     ALLERGIES: Amoxicillin-pot clavulanate, Hydrocodone-acetaminophen, Latex, Oseltamivir, Penicillins, Vancomycin, Hydrocodone, Blood-group specific substance, Clavulanic acid, Fentanyl, Naltrexone, Other drug allergy (see comments), Oxycodone, Adhesive tape, Band-aid anti-itch, Cephalosporins, and Lamotrigine    SOCIAL HISTORY:    Social History     Socioeconomic History     Marital status: Single     Spouse name: Not on file     Number of children: Not on file     Years of education: Not on file     Highest education level: Not on file   Occupational History     Occupation: On disability   Tobacco Use     Smoking status: Never     Smokeless tobacco: Never   Vaping Use     Vaping Use: Not on file   Substance and Sexual Activity     Alcohol use: No     Alcohol/week: 0.0 standard drinks     Drug use: No     Sexual activity: Not Currently     Partners: Male     Birth control/protection: I.U.D.     Comment: IUD - Mirena - placed July, 2015   Other Topics Concern     Parent/sibling w/ CABG, MI or angioplasty before 65F 55M? Not Asked   Social History Narrative    Single.    Living in independent living portion of [facility].    On disability.    No regular exercise.      Social Determinants of Health     Financial Resource Strain: Not on file   Food Insecurity: Not on file   Transportation Needs: Not on file   Physical Activity: Not on  file   Stress: Not on file   Social Connections: Not on file   Intimate Partner Violence: Not on file   Housing Stability: Not on file         FAMILY HISTORY:   Family History   Problem Relation Age of Onset     Diabetes Type 2  Maternal Grandmother      Diabetes Type 2  Paternal Grandmother      Breast Cancer Paternal Grandmother      Cerebrovascular Disease Father 53     Myocardial Infarction No family hx of      Coronary Artery Disease Early Onset No family hx of      Ovarian Cancer No family hx of      Colon Cancer No family hx of      Depression Mother      Anxiety Disorder Mother       Non-contributory for problems with anesthesia    REVIEW OF SYSTEMS:   The patient was asked a 14 point review of systems regarding constitutional symptoms, eye symptoms, ears, nose, mouth, throat symptoms, cardiovascular symptoms, respiratory symptoms, gastrointestinal symptoms, genitourinary symptoms, musculoskeletal symptoms, integumentary symptoms, neurological symptoms, psychiatric symptoms, endocrine symptoms, hematologic/lymphatic symptoms, and allergic/ immunologic symptoms.   The pertinent factors have been included in the HPI and below.  Patient Supplied Answers to Review of Systems  [facility] ROS 10/24/2022   Constitutional: Weight gain, Problems with sleep   Neurology: Dizzy spells, Numbness, Unexplained weakness, Headache   Psychology: Frequently feeling anxious   Ears, Nose, Throat: Ringing/noise in ears, Nasal congestion or drainage, Sore throat, Coughing up blood   Gastrointestinal/Genitourinary: Unexplained nausea/vomiting, Diarrhea   Musculoskeletal: Sore or stiff joints, Swollen joints, Back pain, Neck pain, Swollen legs/feet   Allergy/Immunology: Skin changes   Hematologic: Easy bruising, Lymph node swelling   Endocrine: Thirst, Heat or cold intolerance, Frequent urination       Physical Examination   The patient underwent a physical examination as described below. The pertinent positive and negative findings are  summarized after the description of the examination.  Constitutional: The patient's developmental and nutritional status was assessed. The patient's voice quality was assessed.  Head and Face: The head and face were inspected for deformities. The sinuses were palpated. The salivary glands were palpated. Facial muscle strength was assessed bilaterally.  Eyes: Extraocular movements and primary gaze alignment were assessed.  Ears, Nose, Mouth and Throat: The ears and nose were examined for deformities. The nasal septum, mucosa, and turbinates were inspected by anterior rhinoscopy. The lips, teeth, and gums were examined for abnormalities. The oral mucosa, tongue, palate, tonsils, lateral and posterior pharynx were inspected for the presence of asymmetry or mucosal lesions.    Neck: The tracheal position was noted, and the neck mass palpated to determine if there were any asymmetries, abnormal neck masses, thyromegally, or thyroid nodules.  Respiratory: The nature of the breathing and chest expansion/symmetry was observed.  Cardiovascular: The patient was examined to determine the presence of any edema or jugular venous distension.  Abdomen: The contour of the abdomen was noted.  Lymphatic: The patient was examined for infraclavicular lymphadenopathy.  Musculoskeletal: The patient was inspected for the presence of skeletal deformities.  Extremities: The extremities were examined for any clubbing or cyanosis.  Skin: The skin was examined for inflammatory or neoplastic conditions.  Neurologic: The patient's orientation, mood, and affect were noted. The cranial nerve  functions were examined.  Other pertinent positive and negative findings on physical examination:   OC/OP: no lesions, uvula midline, soft palate elevates symmetrically   Neck: no lesions, left TH tenderness to palpation    All other physical examination findings were within normal limits and noncontributory.    Procedures   Video Laryngoscopy with  Stroboscopy (CPT 32216)    Preoperative Diagnosis:  Dysphonia and throat symptoms  Postoperative Diagnosis: Dysphonia and throat symptoms  Indication:  Patient has new or persistent dysphonia and throat symptoms.  This requires evaluation by stroboscopy to fully delineate the laryngeal functioning; especially mucosal wave assessment, ultrasharp visualization of lesions on the vocal folds, and overall functioning of the larynx.  Details of Procedure: A 70 degree rigid telescopic laryngoscope or a distal chip flexible scope was used. The lighting was obtained via a light cable connected to a stroboscopic unit. The telescope was inserted either transorally or transnasally until the vocal folds could be visualized. The patient was instructed to sustain the vowel  ee  at a comfortable pitch and loudness as the voice was monitored by a microphone connected to a fundamental frequency tracker. This circuit tracked vocal periodicity, allowing the light to flash in synchrony with the glottal cycles. A setting on the stroboscope was set to change the phase of light strobing with relation to the vocal fundamental frequency, producing an image of 1 to 2 glottal cycles every second. The video images were recorded for analysis. Use of the variable speed, slow and stop scan allowed careful study of pertinent segments of laryngeal function to increase accuracy of clinical assessments of function and dysfunction.  In particular, features of glottal closure, mucosal wave on the vocal fold cover and laryngeal symmetry were analyzed. Lastly, the patient was asked to phonate speech samples and auditory/perceptual evaluation of voice and resonance were performed.  The vocal quality was carefully evaluated for hoarseness, breathiness, loudness, phrase length and intelligibility to determine the source of dysphonia.    Findings:      B. LARYNGOVIDEOSTROBOSCOPY   Anatomic/Physiological Deviations:  RNC, bilateral mild restriction in mucosal  wave, left vocal fold paralysis, arytenoid hooding with deep inspiration, septal perforation with copious crusting   Mucosal wave: Right:  Little restriction     Left: Little restriction  Bilateral Vocal Fold Vibration: Symmetric  Vocal Process: Right: No restriction    Left:  Marked restriction  Vocal Fold closure: Complete glottal closure    Complication(s): None  Disposition: Patient tolerated the procedure well                   Review of Relevant Clinical Data   I personally reviewed:  Notes:   ED 3/11/23  FINAL IMPRESSION  1. Swallowed foreign body, initial encounter   2. Anxiety         MEDICAL DECISION MAKING   Pertinent Labs & Imaging studies reviewed. (See chart for details)     Nevin Alvarado is a 32 year old female who presents for evaluation of a swallowed foreign body and anxiety.  Records reviewed.  Patient has a long well-documented history of swallowing foreign bodies and mental health diagnoses.  She has been seen multiple times in this ED as well as various hospitals around the [facility].  She does have a care plan in place which I reviewed.  In summary, it is recommended that ED providers avoid CT scans if possible and instead, proceed with x-ray of abdomen to assess for foreign bodies, be due to issues with IV pain medicine given history of chronic abdominal pain.  Patient was most recently seen here in the ED yesterday for evaluation of left upper quadrant abdominal pain.  She states that she felt better when she left, specifically after receiving IV Dilaudid.  This afternoon, she had relatively sudden onset of anxiety and reports that she swallowed a zip tie made of wire and plastic.  This occurred earlier this afternoon and since, she has developed some upper abdominal discomfort.  She believes that she was triggered to swallow this object after being seen here yesterday, states that once she is back in the medical system she starts to feel more anxiety.  She did attempt to use her  "skills but did not find them to be useful.  In addition to the swallowed foreign body, patient also was requesting to meet with a psychiatrist or mental health professional.  She reports that the psychiatrist she has been working with for the past 5 years is attempting to wean her off of her chronic psych medications and she would like a \"second opinion.\"  She has also been seeing a therapist for the last 6 months and is working on DBT with this provider.  She has found it to be somewhat useful thus far.  Patient has no other new complaints, either physical or mental. Remainder of history and exam, as below.      I considered a broad differential including but not limited to swallowed foreign body, perforation, peritonitis, obstruction, exacerbation of known psychiatric disorder, secondary gain.  Discussed options for work-up and management with the patient.  We agreed on plan to start with x-ray of the abdomen and management of discomfort with p.o. Dilaudid and IM Toradol.  If at all possible, would like to avoid placing an IV and giving IV analgesics but if GI needs to intervene, we would place an IV for procedures.  At this point, I do not see an indication for laboratory work-up.  Patient would like to meet with a mental health professional so we will have her speak with DEC once medically cleared.     XR abdomen revealed a new linear foreign body measuring approximately 10 cm in length overlying the distal thoracic esophagus, gastroesophageal junction and proximal stomach. No radiographic evidence of bowel obstruction or pneumoperitoneum. Will plan to review with GI.     I discussed the case with Dr. Kumar with [facility] who recommended endoscopic removal under sedation but agreed with plan to hold on giving IV opiates.  He graciously called in his team and anesthesiology and patient was taken to the OR for this procedure.  While there, group home staff was consented for procedure and voiced some concern " about her recurrent ingestions and requested a 72-hour hold.  Patient was hoping to meet with mental health professional anyway and given this is a chronic problem, I am not confident that she would meet criteria for a hold if she requests to leave.     Patient was transported back to the ED after recovering in the PACU.  She did have some itchiness and was given dose of Benadryl with relief.  Patient then attempted to sip on some water but reported she began to experience severe left upper quadrant pain.  On exam, she did have mild tenderness to palpation in this area.  Although I feel perforation or complication from procedure is very unlikely, we agreed on plan to obtain a repeat x-ray.  We will also continue with plan to have her speak with DEC.  Patient is currently here voluntarily and would defer to mental health team regarding recommendations for ongoing mental health care.  As this is a chronic problem, she is not voicing any active SI or thoughts of self-harm, do not feel she would like to meet criteria for hold.     [facility] by [facility] and results of x-ray.    ED 3/10/23  MEDICAL DECISION MAKING:    Pertinent Labs & Imaging studies reviewed. (See chart for details)  32 year old female with a h/o frequent foreign body ingestion requiring EGD for retrieval ~2x monthly, chronic abdominal pain, GERD, borderline personality disorder, intentional overdose  presents to the Emergency Department for evaluation of LUQ abdominal pain which wraps around like a band to the back x 2 days.  On exam she is alert, flat affect, uncomfortable appearing and bracing the upper left quadrant.  Abdomen is soft, tender at the left upper quadrant.  No CVA tenderness.  No overlying skin rash or skin breakdown.     Differential diagnosis includes GERD, PUD, cholelithiasis, ascending cholangitis, pancreatitis, foreign body ingestion, constipation, gastroenteritis, ureterolithiasis,  pyelonephritis, UTI, pregnancy, diaphragmatic irritation, lower lobe pneumonia, zoster.  CBC, BMP, LFTs, lipase all WNL.  Urine was without evidence of infection.  CT abdomen was obtained and this is negative for any acute abnormality.  She was given 1 mg of Dilaudid, GI cocktail and famotidine with minor relief.  Added lidocaine patch.  She is reassured by the benign work-up, discussed supportive cares at home to include stretching, warm showers, continued ibuprofen/tyelnol and close monitoring of pain.     There is no evidence of acute or emergent process requiring intervention at this time. Pt is appropriate for outpatient management. Provisional nature of today's diagnosis was discussed and strict return precautions were given. Pt expressed understanding and She was discharged to home in good condition.     GI Dr. Ulloa 1/30/23  Assessment:    Nevin Alvarado is a 32 year old female with morbid obesity, PTDS, esophageal perforation 2019, vocal cord paralysis, cholecystectomy, diarrhea, frequent foreign body ingestion who is presenting as a new patient in consultation at the request of Dr. Simons with a chief complaint of dysphagia, acid reflux.     The patient was seen in video telemedecine consultation regarding her dysphagia and acid reflux. She is not currently on a PPI and denies having tried one in the past. I have started her on omeprazole 40mg daily 30-60 minutes before breakfast as this may resolve the acid reflux and dysphagia if that is related to reflux. Because of repeated trauma from swallowing foreign objects and prior perforation the patient may ave some degree of stricturing also contributing. Depending on the results from endoscopy she may benefit from EGD with dilation. This will only be performed in the absence of a foreign body. Lastly, the patient commented on having diarrhea since undergoing cholecystectomy. I have prescribed cholestyramine 4g TID with meals. She will follow up in 2  months to reassess her symptoms.     1. Gastroesophageal reflux disease, unspecified whether esophagitis present   2. Esophageal dysphagia   3. Diarrhea, unspecified type   4. Swallowed foreign body, sequela   5. Esophageal perforation   6. Morbid obesity with BMI of 40.0-44.9, adult (H)      No orders of the defined types were placed in this encounter.     Plan:  1. Please begin taking omeprazole 40mg once daily 30-60 minutes before breakfast.   2. Please take cholestyramine 4g (one packet) three times a day with meals  3. Follow up in 2 months to reassess symptoms  4. You may benefit from esophageal dilation in the future.     Alma Oliva 11/4/22  Clinical Impression Comments Pt presents with functional oropharyngeal swallow responses.  Oral mech examination significant for flat nasolabial fold on R and L lingual deviation upon protrousion.  Omnipaque contrast initially used due to concern for possible esophageal perforation in the setting of ED visit previous date; there was no leak visualized upon esophageal sweep in AP view and the remainder of the study was completed with barium.  Trials under flouroscopy revealed premature pharyngeal entry but no penetration or aspiration.  No pharyngeal residue observed.  Solid textures were challenge items from home including grapes and a sandwich.  Pt denied globus throughout the evaluation.  A full esophagram was completed following the VFSS.  At this time, pt is appopriate for a regular texture diet and thin liquids.  No compensatory strategies were indicated.  Further ST for dysphagia is not indicated; however, it is recommeded that pt continue with SLP for voice and follow up with GI service.  Pt was educated on these results and recommendations.  Pt reported understanding and agreement.    Radiology:   CT neck also reviewed with neurorads - no lesion along RLN     Video Esophagram Review Rounds  Imaging Review of MBSS conducted with attending physician Chrissy  Ronak and reviewed/discussed with SLP Judith Pryor, Alma Oliva, Mayela Alvarado, and/or Yelitza Farrar     Relevant Background:     Esophagram: 11/4/22  1. No penetration or aspiration. See same day speech pathology note for additional details regarding swallow portion of the exam.  2. No stricture, filling defect, or definite hiatal hernia.  3. Limited esophageal motility evaluation due to impaired patient mobility, though the esophagus was patulous with some evidence of dysmotility.  4. Dense barium limits mucosal evaluation of the distal esophagus on double contrast portion. No obvious mucosal abnormality within the upstream esophagus.     MBSS Date: 11/4/22     Findings:  Pharyngeal Weakness:No  Epiglottic dysfunction: No  Penetration: No  Aspiration: No  UES dysfunction: No  Details: safe swallow        Recommendations:  Diet: current      CT soft tissue neck 2/18/23  IMPRESSION:   1.  No radiodense metallic foreign body.  2.  Medialization of the right vocal cord with dilatation of the right laryngeal ventricle. Findings can be seen with vocal cord paralysis. Consider correlation with direct visualization.    CT chest 2/18/23  IMPRESSION:   1.  Negative chest CT.    XR chest 2/16/23  IMPRESSION: Negative chest. Metallic foreign body no longer present.    XR neck soft tissue 2/16/23  IMPRESSION:  Frontal radiograph is grossly unremarkable. No radiopaque foreign body identified. Lateral radiograph is markedly overpenetrated, essentially nondiagnostic. Consider repeat lateral radiograph if clinically warranted    Procedures:   EGD 3/11/23  Impression:              - Normal esophagus.   - Twist tie were found in the stomach. Removal was successful.   - Normal examined duodenum.    Labs:  Lab Results   Component Value Date    TSH 4.94 (H) 02/11/2022     Lab Results   Component Value Date     03/10/2023    CO2 31 03/10/2023    BUN 10 03/10/2023    CREAT 0.6 08/31/2020    PHOS 3.5 12/01/2019     Lab  "Results   Component Value Date    WBC 8.4 03/10/2023    HGB 13.7 03/10/2023    HCT 42.2 03/10/2023    MCV 89 03/10/2023     03/10/2023     Lab Results   Component Value Date    PTT 24 01/15/2023    INR 1.11 01/15/2023     No results found for: JULIA  No components found for: RHEUMATOIDFACTOR,  RF  Lab Results   Component Value Date    CRP 1.3 (H) 02/18/2023    CRP 26.0 (H) 10/31/2020    CRP 27.0 (H) 10/30/2020    CRP 23.0 (H) 10/11/2020    CRP 22.0 (H) 09/05/2020     No components found for: CKTOT, URICACID  No components found for: C3, C4, DSDNAAB, NDNAABIFA  No results found for: MPOAB    Patient reported Quality of Life (QOL) Measures   Patient Supplied Answers To VHI Questionnaire  Voice Handicap Index (VHI-10) 10/24/2022   My voice makes it difficult for people to hear me 3   People have difficulty understanding me in a noisy room 3   My voice difficulties restrict my personal and social life.  2   I feel left out of conversations because of my voice 2   My voice problem causes me to lose income 0   I feel as though I have to strain to produce voice 3   The clarity of my voice is unpredictable 3   My voice problem upsets me 2   My voice makes me feel handicapped 2   People ask, \"What's wrong with your voice?\" 3   VHI-10 23         Patient Supplied Answers To EAT Questionnaire  Eating Assessment Tool (EAT-10) 10/24/2022   My swallowing problem has caused me to lose weight 0   My swallowing problem interferes with my ability to go out for meals 2   Swallowing liquids takes extra effort 0   Swallowing solids takes extra effort 2   Swallowing pills takes extra effort 2   Swallowing is painful 2   The pleasure of eating is affected by my swallowing 2   When I swallow food sticks in my throat 3   I cough when I eat 2   Swallowing is stressful 3   EAT-10 18         Patient Supplied Answers To CSI Questionnaire  Cough Severity Index (CSI) 10/24/2022   My cough is worse when I lie down 2   My coughing problem " causes me to restrict my personal and social life 2   I tend to avoid places because of my cough problem 2   I feel embarrassed because of my coughing problem 2   People ask, ''What's wrong?'' because I cough a lot 2   I run out of air when I cough 3   My coughing problem affects my voice 3   My coughing problem limits my physical activity 2   My coughing problem upsets me 2   People ask me if I am sick because I cough a lot 2   CSI Score 22         [email or web address]    Impression & Plan     IMPRESSION: Ms. Alvarado is a 32 year old female who is being seen for the followin. Dysphonia   - since May  - after URI  - voice was gone for 3 weeks  - now better but not normal  - both singing and speaking voice  - seen in the hospital on  with vocal fold paralysis on the left  - has complex history of multiple EGDs for foreign body ingestion, had esophageal perf in  which required endoscopic procedures, no open procedures  - has coughing and vomiting as well  - had CT neck 2022 - no obvious lesions  - scope shows left vocal fold paralysis, postcricoid edema, right>left infraglottic prominence/granuloma   - discussed will review CT neck with neurorads - if clear then this is idiopathic vocal fold paralysis vs post-surgical after her perforation in , she can't remember if voice changes happened at that time  - discussed if this is idiopathic after her cold in May - it takes 1 year to allow for full recovery  - voice therapy for optimization both for vocal fold paralysis, as well as for irritable larynx syndrome and for vocal process granuloma/prominence   - did voice therapy in [place] without benefit   - symptoms 3/31/23 voice is hoarse in the morning or after not talking for a while, improves throughout the day and no pain with talking or singing, voice cuts out with singing but has improved    - did do therapy with Anjali, did not find benefit  - scope shows bilateral mild restriction in mucosal wave,  left vocal fold paralysis, arytenoid hooding with deep inspiration, septal perforation with copious crusting    - discussed voice therapy, if voice worsens or becomes painful could consider procedural intervention but not significantly bothersome now   Plan  - observation- repeat evaluation in June - 1 year out since voice change onset      2. Dysphagia  - in the setting of complex history of multiple EGDs for foreign body ingestion, had esophageal perf in 2019 which required endoscopic procedures, no open procedures  - feels foods like bread and meat get stuck  - no recent swallow evaluations  - just had a foreign body requiring EGD last night  - coughing up blood at times - likely esophageal   - does have nasal crusting today - so discussed vaseline for that   - discussed having GI follow up and swallow evaluation  - reviewed Xray Video Swallow Exam 11/4/22 at swallow rounds - safe swallow   - symptoms 3/31/23 has some discomfort with swallowing but no sharp pain with swallowing saliva   Plan  - continue follow-up with Dr. Ulloa       3. Septal perforation  - unsure how this started   - very painful with no associated intranasal substance use   - scope shows bilateral mild restriction in mucosal wave, left vocal fold paralysis, arytenoid hooding with deep inspiration, septal perforation with copious crusting    - discussed Neti pot and Vaseline/aquaphor to clear crusting  - discussed consulting with rhinology team  - discussed breathing therapy, will start with medical management for hydration of perforation  Plan   - start Neti pot and Vaseline/aquaphor   - consult with Dr. Apple, will provide a referral         RETURN VISIT: June 2023    Scribe Disclosure:  Megan MURRAY am serving as a scribe to document services personally performed by Chrissy Simons MD at this visit, based upon the provider's statements to me. All documentation has been reviewed by the aforementioned provider prior to being entered  into the official medical record.     Chrissy Simons MD    Laryngology    [facility]  Department of  Otolaryngology - Head and Neck Surgery  [facility] [place] [address]  Appointment line: [phone]  Fax: [phone]  [email or web address]          Again, thank you for allowing me to participate in the care of your patient.      Sincerely,    Chrissy Simons MD

## 2023-03-31 NOTE — PATIENT INSTRUCTIONS
"1.  You were seen in the ENT Clinic today by Dr. Simons. If you have any questions or concerns after your appointment, please call [phone]. Press option #1 for scheduling related needs. Press option #3 for Nurse advice.    2.  Dr. Simons has recommended the following:   - Start Neti Pot  - After Neti Pot apply vaseline/aquaphor with q-tip in both nostrils  - Consult with Dr. Marvin    3.  Plan is to return to clinic       Ivonne Cruz  [phone]  [facility] - Otolaryngology             Rinsing out your nose with salt water     Why should I rinse my nose with salt water?   -- Rinsing out your nose with salt water can wash dirt and mucus from your nose. It also helps wash away things that trigger allergies, such as pollen, mold spores, and dust.  Rinsing out your nose with salt water is also called \"nasal irrigation.\"  Your doctor might recommend that you rinse out your nose with salt water when you have:  ?A stuffy or runny nose from a cold or allergies  ?Post-nasal drip - This happens when mucus from your nose drips down the back of your throat.  ?Sinusitis - This condition causes mucus, a stuffy nose, and pain in the face.    How do I make the salt water?   -- To make the salt water solution, follow these steps:  1) Find a clean, 1-quart glass jar with a lid. Fill it with distilled water or tap water that has been boiled and cooled. This is important because a few people have gotten serious infections from using tap water that was not clean. These infections are very rare, but it's better to be absolutely safe.  2) You can buy premixed packets for making the solution at a drug store or you can make your own. To make your own, use 1 teaspoon of baking soda and 1 to 1.5 teaspoons of salt. Use pickling or tian salt, which is very pure and dissolves easily. Do not use regular table salt because it contains other chemicals besides salt.  3) Mix and store at room temperature for up to 1 week. Throw out any salt water " "that you don't use within a week.    How do I get the solution inside my nose?   -- There are several products you can use to squirt the solution into your nose. These are made for this purpose. Some examples include:  ?A squeeze bottle (sample brand name: Cristino Med Sinus Rinse)  ?A neti pot (sample brand name: Cristino Med NasaFlo Neti Pot) - This is a small pot with a long spout, similar to a teapot.   ?A nasal irrigation syringe (sample brand name: Nasaline) - Use a 60 cc (2 ounce) syringe, not a bulb syringe for a baby.  ?A pulsating irrigation device (sample brand names: Grossan HydroPulse, Angiologix Sinusense Water Pulsator) - These are battery-powered devices that send a gentle pulse of water into the nose. Make sure to get one with a nasal irrigation tip.  If you are using a syringe, pour the amount of fluid you plan to use into a clean bowl, or pour it directly into the squeeze bottle or neti pot. Do not put your used syringe back into the storage container. If you like, you can warm the solution slightly in the microwave. This might make the rinsing process more comfortable. But be sure that the solution is not hot.  Bend over the sink with your head turned slightly to one side and squirt the solution into the nostril that is higher. You can also do this in the shower. Aim the stream toward the back of your head, not the top of your head. Keep your mouth open. The solution should flow into one nostril and out the other. It's fine if you swallow a small amount. You might feel a little burning sensation the first few times you rinse out your nose. This usually goes away after you get used to it.  Once all the solution has run out of your nose, blow your nose gently. Some salt water might drain out of your nose over the next few minutes if you bend over. If some salt water seems to get trapped up in your sinuses, you can bend forward and look upwards toward one side, as if you are \"looking under the sink.\" Do this " looking in one direction, then stand up straight, then in the other direction. When you stand up, some extra salt water might drain out.  Clean your device after each use, either with boiling water or as instructed by the makers of the device. Let it air-dry or dry it with a clean towel.    How often should I rinse out my nose with salt water?   -- Some people rinse out their nose every day. Others rinse only when they have symptoms. You can safely rinse out your nose a few times per day.  If you use nasal sprays to treat your symptoms, use them after your rinse out your nose.

## 2023-03-31 NOTE — TELEPHONE ENCOUNTER
FUTURE VISIT INFORMATION      FUTURE VISIT INFORMATION:    Date: 4/21/23    Time: 1PM    Location: [facility]  REFERRAL INFORMATION:    Referring provider:  Dr. Simons     Referring providers clinic:  [facility]    Reason for visit/diagnosis  Nasal septal perforation - referred by Dr Simons    RECORDS REQUESTED FROM:       Clinic name Comments Records Status Imaging Status   [facility] 3/31/23- note with Dr. Simons  Epic

## 2023-04-03 ENCOUNTER — VIRTUAL VISIT (OUTPATIENT)
Dept: GASTROENTEROLOGY | Facility: CLINIC | Age: 33
End: 2023-04-03
Attending: INTERNAL MEDICINE
Payer: COMMERCIAL

## 2023-04-03 DIAGNOSIS — K21.9 GASTROESOPHAGEAL REFLUX DISEASE, UNSPECIFIED WHETHER ESOPHAGITIS PRESENT: ICD-10-CM

## 2023-04-03 DIAGNOSIS — R13.19 ESOPHAGEAL DYSPHAGIA: ICD-10-CM

## 2023-04-03 PROCEDURE — 99215 OFFICE O/P EST HI 40 MIN: CPT | Mod: VID | Performed by: PHYSICIAN ASSISTANT

## 2023-04-03 NOTE — PROGRESS NOTES
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 improving will defer additional evaluation at this time.     Future considerations could be to repeat EGD with empiric dilation, only to be performed in absence of a foreign body for possibly stricturing disease.     - Continue Omeprazole 40mg once daily 30-60 minutes before breakfast.   - Continued lifestyle modifications of chewing well, taking small bites and avoiding trigger foods   - Discontinue Cholestyramine (Questran) as this resulted in constipation     Follow up plan:   Return to clinic 3 months and as needed.    The risks and benefits of my recommendations, as well as other treatment options were discussed with the patient and any available family today. All questions were answered.     o Follow up: As planned above. Today, I personally spent 17 minutes in direct face to face time with the patient, of which greater than 50% of the time was spent in patient education and counseling as described above. Approximately 26 minutes were spent on indirect care associated with the patient's consultation including but not limited to review of: patient medical records to date, clinic visits, hospital records, lab results, imaging studies, procedural documentation, and coordinating care with other providers. The findings from this review are summarized in the above note. All of the above accounted for a cumulative time of 44 minutes and was performed on the date of service.     The patient verbalized understanding of the plan and was appreciative for the time spent and information provided during the office visit.       Author:   Carli Potter  REUBEN  Division of Gastroenterology, Hepatology, and Nutrition  [facility]     Documentation assisted by voice recognition and documentation system.

## 2023-04-03 NOTE — PATIENT INSTRUCTIONS
It was a pleasure taking care of you today.  I've included a brief summary of our discussion and care plan from today's visit below.  Please review this information with your primary care provider.  _______________________________________________________________________    My recommendations are summarized as follows:    - Continue Omeprazole 40mg once daily 30-60 minutes before breakfast.   - Continued lifestyle modifications of chewing well, taking small bites and avoiding trigger foods   - Discontinue Cholestyramine (Questran) as this resulted in constipation       GERD Lifestyle Modifications:   If taking acid suppression therapy (PPI) it should be taken 30 - 60 minutes prior to meals on an empty stomach to have maximum effect  Avoid triggers for reflux such as coffee, chocolate, carbonated beverages, spicy foods, acidic foods (tomato based/citrus and foods with high fat content   Abstinence from alcohol and cessation of all tobacco products is recommended   Studies have shown that weight loss, exercise and maintaining a healthy BMI significantly reduce GERD symptoms   Remain upright while eating and immediately after meals  Do not eat or drink at least 3 hours prior to laying down supine/laying down for bed   Avoid late night/middle of the night snacking    Consider obtaining a wedge pillow or elevating the head end of the bed while sleeping   Avoid sleeping right side down as this can place the lower part of the esophagus/lower esophageal sphincter in a dependent position that favors reflux   Attempting to eat smaller more frequent meals may improve symptoms       To schedule endoscopic procedures you may call: [phone]  To schedule radiology (imaging) tests you may call: [phone]  To schedule an ENT appointment you may call: [phone]    Please call my nurse Arianna ([phone]), Roberta ([phone]) with any questions or concerns.      Return to GI Clinic in 3 months to review your progress.     _______________________________________________________________________    Who do I call with any questions after my visit?  Please be in touch if there are any further questions that arise following today's visit.  There are multiple ways to contact your gastroenterology care team.      During business hours, you may reach a Gastroenterology nurse at [phone] and choose option 3.       To schedule or reschedule an appointment, please call [phone].     You can always send a secure message through [employer].  [employer] messages are answered by your nurse or doctor typically within 24 hours.  Please allow extra time on weekends and holidays.      For urgent/emergent questions after business hours, you may reach the on-call GI Fellow by contacting the [facility] at [phone].     How will I get the results of any tests ordered?    You will receive all of your results.  If you have signed up for [employer], any tests ordered at your visit will be available to you after your physician reviews them.  Typically this takes 1-2 weeks.  If there are urgent results that require a change in your care plan, your physician or nurse will call you to discuss the next steps.      What is [employer]?  [employer] is a secure way for you to access all of your healthcare records from the [facility].  It is a web based computer program, so you can sign on to it from any location.  It also allows you to send secure messages to your care team.  I recommend signing up for [employer] access if you have not already done so and are comfortable with using a computer.      How to I schedule a follow-up visit?  If you did not schedule a follow-up visit today, please call [phone] to schedule a follow-up office visit.      If you feel you received exceptional care and are interested in supporting the clinical and research goals of Carli Potter PA-C or the Division of Gastroenterology, Hepatology, and Nutrition  please contact [email or web address] from the [facility] to discuss opportunities to donate.    Sincerely,    Carli Potter PA-C  Division of Gastroenterology, Hepatology, and Nutrition  [facility]

## 2023-04-03 NOTE — NURSING NOTE
Is the patient currently in the state of [place]? YES    Visit mode:VIDEO    If the visit is dropped, the patient can be reconnected by: VIDEO VISIT: Send to e-mail at: [email or web address]    Will anyone else be joining the visit? NO      How would you like to obtain your AVS? MyChart    Are changes needed to the allergy or medication list? YES-updated made via med list.    Reason for visit: Follow up for doe [place]

## 2023-04-03 NOTE — LETTER
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 of esophgeal perforation 2019. Intrinsic motility disorder of the esophagus remains on the differential however as symptoms are improving will defer additional evaluation at this time.     Future considerations could be to repeat EGD with empiric dilation, only to be performed in absence of a foreign body for possibly stricturing disease.     - Continue Omeprazole 40mg once daily 30-60 minutes before breakfast.   - Continued lifestyle modifications of chewing well, taking small bites and avoiding trigger foods   - Discontinue Cholestyramine (Questran) as this resulted in constipation     Follow up plan:   Return to clinic 3 months and as needed.    The risks and benefits of my recommendations, as well as other treatment options were discussed with the patient and any available family today. All questions were answered.     Follow up: As planned above. Today, I personally spent 17 minutes in direct face to face time with the patient, of which greater than 50% of the time was spent in patient education and counseling as described above. Approximately 26 minutes were spent on indirect care associated with the patient's consultation including but not limited to review of: patient medical records to date, clinic visits, hospital records, lab results, imaging studies, procedural documentation, and coordinating care with other providers. The findings from this review are summarized in the above note. All of the above accounted for a cumulative time of 44 minutes and was  performed on the date of service.     The patient verbalized understanding of the plan and was appreciative for the time spent and information provided during the office visit.       Author:   Carli Potter PA-C  Division of Gastroenterology, Hepatology, and Nutrition  [facility]     Documentation assisted by voice recognition and documentation system.              Again, thank you for allowing me to participate in the care of your patient.        Sincerely,        Carli Potter PA-C

## 2023-04-05 ENCOUNTER — TELEPHONE (OUTPATIENT)
Dept: GASTROENTEROLOGY | Facility: CLINIC | Age: 33
End: 2023-04-05
Payer: COMMERCIAL

## 2023-04-13 DIAGNOSIS — R05.9 COUGH: Primary | ICD-10-CM

## 2023-04-14 ENCOUNTER — PRE VISIT (OUTPATIENT)
Dept: PULMONOLOGY | Facility: CLINIC | Age: 33
End: 2023-04-14

## 2023-04-17 ENCOUNTER — TELEPHONE (OUTPATIENT)
Dept: PULMONOLOGY | Facility: CLINIC | Age: 33
End: 2023-04-17
Payer: COMMERCIAL

## 2023-04-17 NOTE — TELEPHONE ENCOUNTER
Patient Contacted    Appointment type: CXR  Provider: MADDI  Return date: 6/29/23  Specialty phone number: [phone]  Additional appointment(s) needed: NA  Additonal Notes: Pt requested mailed itinerary. Pt's street address was verified.

## 2023-04-21 ENCOUNTER — PRE VISIT (OUTPATIENT)
Dept: OTOLARYNGOLOGY | Facility: CLINIC | Age: 33
End: 2023-04-21

## 2023-04-28 NOTE — TELEPHONE ENCOUNTER
FUTURE VISIT INFORMATION      FUTURE VISIT INFORMATION:    Date: 7/7/23    Time: 11:30am    Location: [facility] [place]  REFERRAL INFORMATION:    Referring provider:  Dr Simons    Referring providers clinic:  [facility]    Reason for visit/diagnosis  Nasal septal perforation - referred by Dr Simons    RECORDS REQUESTED FROM:       Clinic name Comments Records Status Imaging Status   [facility] 3/31/23- note with Dr. Simons  Epic     Imaging  6/29/23- XR Chest  Epic  PACS

## 2023-04-30 ENCOUNTER — HOSPITAL ENCOUNTER (EMERGENCY)
Facility: CLINIC | Age: 33
Discharge: HOME | End: 2023-04-30
Attending: EMERGENCY MEDICINE | Admitting: EMERGENCY MEDICINE
Payer: COMMERCIAL

## 2023-04-30 ENCOUNTER — APPOINTMENT (OUTPATIENT)
Dept: RADIOLOGY | Facility: CLINIC | Age: 33
End: 2023-04-30
Attending: EMERGENCY MEDICINE
Payer: COMMERCIAL

## 2023-04-30 VITALS
HEIGHT: 62 IN | SYSTOLIC BLOOD PRESSURE: 128 MMHG | HEART RATE: 94 BPM | DIASTOLIC BLOOD PRESSURE: 67 MMHG | BODY MASS INDEX: 53.92 KG/M2 | OXYGEN SATURATION: 97 % | TEMPERATURE: 98.4 F | RESPIRATION RATE: 20 BRPM | WEIGHT: 293 LBS

## 2023-04-30 DIAGNOSIS — N76.0 BACTERIAL VAGINOSIS: ICD-10-CM

## 2023-04-30 DIAGNOSIS — R10.84 ABDOMINAL PAIN, GENERALIZED: ICD-10-CM

## 2023-04-30 DIAGNOSIS — B96.89 BACTERIAL VAGINOSIS: ICD-10-CM

## 2023-04-30 LAB
ALBUMIN SERPL-MCNC: 3.8 G/DL (ref 3.5–5)
ALBUMIN UR-MCNC: 30 MG/DL
ALP SERPL-CCNC: 86 U/L (ref 45–120)
ALT SERPL W P-5'-P-CCNC: 31 U/L (ref 0–45)
ANION GAP SERPL CALCULATED.3IONS-SCNC: 10 MMOL/L (ref 5–18)
APPEARANCE UR: CLEAR
AST SERPL W P-5'-P-CCNC: 22 U/L (ref 0–40)
BACTERIA #/AREA URNS HPF: ABNORMAL /HPF
BASOPHILS # BLD AUTO: 0 10E3/UL (ref 0–0.2)
BASOPHILS NFR BLD AUTO: 1 %
BILIRUB SERPL-MCNC: 0.3 MG/DL (ref 0–1)
BILIRUB UR QL STRIP: NEGATIVE
BUN SERPL-MCNC: 11 MG/DL (ref 8–22)
CALCIUM SERPL-MCNC: 9.5 MG/DL (ref 8.5–10.5)
CHLORIDE BLD-SCNC: 104 MMOL/L (ref 98–107)
CO2 SERPL-SCNC: 29 MMOL/L (ref 22–31)
COLOR UR AUTO: YELLOW
CREAT SERPL-MCNC: 0.61 MG/DL (ref 0.6–1.1)
EOSINOPHIL # BLD AUTO: 0.3 10E3/UL (ref 0–0.7)
EOSINOPHIL NFR BLD AUTO: 4 %
ERYTHROCYTE [DISTWIDTH] IN BLOOD BY AUTOMATED COUNT: 12.9 % (ref 10–15)
GFR SERPL CREATININE-BSD FRML MDRD: >90 ML/MIN/1.73M2
GLUCOSE BLD-MCNC: 114 MG/DL (ref 70–125)
GLUCOSE UR STRIP-MCNC: NEGATIVE MG/DL
HCG SERPL QL: NEGATIVE
HCT VFR BLD AUTO: 38.4 % (ref 35–47)
HGB BLD-MCNC: 12.8 G/DL (ref 11.7–15.7)
HGB UR QL STRIP: NEGATIVE
IMM GRANULOCYTES # BLD: 0 10E3/UL
IMM GRANULOCYTES NFR BLD: 0 %
KETONES UR STRIP-MCNC: ABNORMAL MG/DL
LEUKOCYTE ESTERASE UR QL STRIP: NEGATIVE
LIPASE SERPL-CCNC: 37 U/L (ref 0–52)
LYMPHOCYTES # BLD AUTO: 1.8 10E3/UL (ref 0.8–5.3)
LYMPHOCYTES NFR BLD AUTO: 21 %
MCH RBC QN AUTO: 29 PG (ref 26.5–33)
MCHC RBC AUTO-ENTMCNC: 33.3 G/DL (ref 31.5–36.5)
MCV RBC AUTO: 87 FL (ref 78–100)
MONOCYTES # BLD AUTO: 0.6 10E3/UL (ref 0–1.3)
MONOCYTES NFR BLD AUTO: 7 %
MUCOUS THREADS #/AREA URNS LPF: PRESENT /LPF
NEUTROPHILS # BLD AUTO: 5.9 10E3/UL (ref 1.6–8.3)
NEUTROPHILS NFR BLD AUTO: 67 %
NITRATE UR QL: NEGATIVE
NRBC # BLD AUTO: 0 10E3/UL
NRBC BLD AUTO-RTO: 0 /100
PH UR STRIP: 6 [PH] (ref 5–7)
PLATELET # BLD AUTO: 290 10E3/UL (ref 150–450)
POTASSIUM BLD-SCNC: 4 MMOL/L (ref 3.5–5)
PROT SERPL-MCNC: 6.8 G/DL (ref 6–8)
RBC # BLD AUTO: 4.42 10E6/UL (ref 3.8–5.2)
RBC URINE: <1 /HPF
SODIUM SERPL-SCNC: 143 MMOL/L (ref 136–145)
SP GR UR STRIP: 1.03 (ref 1–1.03)
SQUAMOUS EPITHELIAL: 1 /HPF
UROBILINOGEN UR STRIP-MCNC: <2 MG/DL
WBC # BLD AUTO: 8.7 10E3/UL (ref 4–11)
WBC URINE: 1 /HPF

## 2023-04-30 PROCEDURE — 84703 CHORIONIC GONADOTROPIN ASSAY: CPT | Performed by: NURSE PRACTITIONER

## 2023-04-30 PROCEDURE — 85025 COMPLETE CBC W/AUTO DIFF WBC: CPT | Performed by: NURSE PRACTITIONER

## 2023-04-30 PROCEDURE — 83690 ASSAY OF LIPASE: CPT | Performed by: NURSE PRACTITIONER

## 2023-04-30 PROCEDURE — 81001 URINALYSIS AUTO W/SCOPE: CPT | Performed by: NURSE PRACTITIONER

## 2023-04-30 PROCEDURE — 258N000003 HC RX IP 258 OP 636: Performed by: EMERGENCY MEDICINE

## 2023-04-30 PROCEDURE — 96361 HYDRATE IV INFUSION ADD-ON: CPT

## 2023-04-30 PROCEDURE — 36415 COLL VENOUS BLD VENIPUNCTURE: CPT | Performed by: NURSE PRACTITIONER

## 2023-04-30 PROCEDURE — 80053 COMPREHEN METABOLIC PANEL: CPT | Performed by: NURSE PRACTITIONER

## 2023-04-30 PROCEDURE — 99285 EMERGENCY DEPT VISIT HI MDM: CPT | Mod: 25

## 2023-04-30 PROCEDURE — 96374 THER/PROPH/DIAG INJ IV PUSH: CPT

## 2023-04-30 PROCEDURE — 250N000011 HC RX IP 250 OP 636: Performed by: EMERGENCY MEDICINE

## 2023-04-30 PROCEDURE — 74019 RADEX ABDOMEN 2 VIEWS: CPT

## 2023-04-30 PROCEDURE — 96375 TX/PRO/DX INJ NEW DRUG ADDON: CPT

## 2023-04-30 RX ORDER — ONDANSETRON 2 MG/ML
4 INJECTION INTRAMUSCULAR; INTRAVENOUS EVERY 30 MIN PRN
Status: DISCONTINUED | OUTPATIENT
Start: 2023-04-30 | End: 2023-05-01 | Stop reason: HOSPADM

## 2023-04-30 RX ORDER — SODIUM CHLORIDE 9 MG/ML
INJECTION, SOLUTION INTRAVENOUS CONTINUOUS
Status: DISCONTINUED | OUTPATIENT
Start: 2023-04-30 | End: 2023-05-01 | Stop reason: HOSPADM

## 2023-04-30 RX ORDER — HYDROMORPHONE HYDROCHLORIDE 1 MG/ML
0.5 INJECTION, SOLUTION INTRAMUSCULAR; INTRAVENOUS; SUBCUTANEOUS
Status: COMPLETED | OUTPATIENT
Start: 2023-04-30 | End: 2023-04-30

## 2023-04-30 RX ORDER — KETOROLAC TROMETHAMINE 15 MG/ML
15 INJECTION, SOLUTION INTRAMUSCULAR; INTRAVENOUS ONCE
Status: COMPLETED | OUTPATIENT
Start: 2023-04-30 | End: 2023-04-30

## 2023-04-30 RX ADMIN — SODIUM CHLORIDE 1000 ML: 9 INJECTION, SOLUTION INTRAVENOUS at 21:55

## 2023-04-30 RX ADMIN — HYDROMORPHONE HYDROCHLORIDE 0.5 MG: 1 INJECTION, SOLUTION INTRAMUSCULAR; INTRAVENOUS; SUBCUTANEOUS at 23:17

## 2023-04-30 RX ADMIN — KETOROLAC TROMETHAMINE 15 MG: 15 INJECTION, SOLUTION INTRAMUSCULAR; INTRAVENOUS at 21:56

## 2023-04-30 RX ADMIN — ONDANSETRON 4 MG: 2 INJECTION INTRAMUSCULAR; INTRAVENOUS at 21:55

## 2023-04-30 ASSESSMENT — ACTIVITIES OF DAILY LIVING (ADL): ADLS_ACUITY_SCORE: 40

## 2023-05-01 NOTE — ED TRIAGE NOTES
Pt reports ongoing BV infection that won't seem to resolve for the last couple of months. Over the past 10 days, she has had pain across lower abdomen and to LUQ. Pain radiates through to her back and she has had intermittent nausea and diarrhea. Also reports decreased appetite. Took tylenol around 1830 and zofran around 1930.     Triage Assessment     Row Name 04/30/23 2041       Triage Assessment (Adult)    Airway WDL WDL       Respiratory WDL    Respiratory WDL WDL       Skin Circulation/Temperature WDL    Skin Circulation/Temperature WDL WDL       Cardiac WDL    Cardiac WDL WDL       Peripheral/Neurovascular WDL    Peripheral Neurovascular WDL WDL       Cognitive/Neuro/Behavioral WDL    Cognitive/Neuro/Behavioral WDL WDL

## 2023-05-01 NOTE — ED PROVIDER NOTES
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     medroxyPROGESTERone (PROVERA) 10 MG tablet     metFORMIN (GLUCOPHAGE XR) 500 MG 24 hr tablet     montelukast (SINGULAIR) 10 MG tablet     OLANZapine (ZYPREXA) 2.5 MG tablet     omeprazole (PRILOSEC) 40 MG DR capsule     ondansetron (ZOFRAN-ODT) 4 MG ODT tab     pregabalin (LYRICA) 100 MG capsule     Respiratory Therapy Supplies (NEBULIZER) BRENDAN     Semaglutide 3 MG TABS      SUMAtriptan (IMITREX) 25 MG tablet     valACYclovir (VALTREX) 1000 mg tablet         ALLERGIES:  Allergies   Allergen Reactions     Amoxicillin-Pot Clavulanate Other (See Comments), Swelling and Rash     PN: facial swelling, left side. Also had cortisone injection the same day as she started the Augmentin.  Noted in downtime recovery of chart.    PN: facial swelling, left side. Also had cortisone injection the same day as she started the Augmentin.; HUT Comment: PN: facial swelling, left side. Also had cortisone injection the same day as she started the Augmentin.Noted in downtime recovery of chart.; HUT Reaction: Rash; HUT Reaction: Unknown; HUT Reaction Type: Allergy; HUT Severity: Med; HUT Noted: [id number]  PN: facial swelling, left side. Also had cortisone injection the same day as she started the Augmentin.  Other reaction(s): *Unknown  PN: facial swelling, left side. Also had cortisone injection the same day as she started the Augmentin.  Noted in downtime recovery of chart.  PN: facial swelling, left side. Also had cortisone injection the same day as she started the Augmentin.  Other reaction(s): Facial swelling  Other reaction(s): Facial swelling     Hydrocodone Nausea and Vomiting and GI Disturbance     vomiting for days, PN: vomiting for days; HUT Comment: vomiting for days; HUT Reaction: Gastrointestinal; HUT Reaction: Nausea And Vomiting; HUT Reaction Type: Intolerance; HUT Severity: Med; HUT Noted: [id number]  vomiting for days    Other reaction(s): Rash     Hydrocodone-Acetaminophen Nausea and Vomiting and Rash     Update on 12/12  Pt says she can take tylenol just not the hydrocodone.   Other reaction(s): Rash       Influenza Vaccines Other (See Comments) and Nausea and Vomiting     HUT Reaction: Nausea And Vomiting; HUT Reaction Type: Intolerance; HUT Severity: Low; HUT Noted: [id number]     Latex Rash     HUT Reaction: Rash; HUT Reaction Type: Allergy; HUT Severity: Low; HUT Noted: [id number]  Other  reaction(s): Rash       Oseltamivir Hives     med stopped, PN: med stopped  med stopped, PN: med stopped; HUT Comment: med stopped, PN: med stopped; HUT Reaction: Hives; HUT Reaction Type: Allergy; HUT Severity: Med; HUT Noted: [id number]     Penicillins Anaphylaxis     HUT Reaction: Anaphylaxis; HUT Reaction Type: Allergy; HUT Severity: High; HUT Noted: [id number]     Vancomycin Itching, Swelling and Rash     Other reaction(s): Redness  Flushed face, very itchy; HUT Comment: Flushed face, very itchy; HUT Reaction: Angioedema; HUT Reaction: Redness; HUT Severity: Med; HUT Noted: [id number]    facial     Blood-Group Specific Substance Other (See Comments)     Patient has an anti-Cw and non-specific antibodies. Blood product orders may be delayed. Draw one red top and two purple top tubes for all type/screen/crossmatch orders.  Patient has anti-Cw, anti-K ([place]), Warm auto and nonspecific antibodies. Blood products may be delayed. Draw patient 24 hours prior to transfusion. Draw one red top and two purple top tubes for all type and screen orders.     Clavulanic Acid Angioedema     Fentanyl Itching     Hemophilus B Polysaccharide Vaccine Nausea and Vomiting     Naltrexone Other (See Comments)     Reaction(s): Vivid dreams.     Other Drug Allergy (See Comments)      See original file MRN [id number]. Files are marked for merge     Oxycodone Swelling     Adhesive Tape Rash     Silicone type  Silicone type    Other reaction(s): adhesive allergy  Other reaction(s): adhesive allergy  Silicone type    Other reaction(s): adhesive allergy       Band-Aid Anti-Itch      Other reaction(s): adhesive allergy     Cephalosporins Rash     Lamotrigine Rash     Possibly associated with Lamictal.   HUT Comment: Possibly associated with Lamictal. ; HUT Reaction: Rash; HUT Reaction Type: Allergy; HUT Severity: Low; HUT Noted: [id number]     No Clinical Screening - See Comments Rash and Other (See Comments)     Silicone type  Silicone type  See  "original file MRN [id number]. Files are marked for merge  History of swallowing sharp metallic objects. She should not be prescribed lancets due to posed risk of swallowing.        FAMILY HISTORY:  Family History   Problem Relation Age of Onset     Diabetes Type 2  Maternal Grandmother      Diabetes Type 2  Paternal Grandmother      Breast Cancer Paternal Grandmother      Cerebrovascular Disease Father 53     Myocardial Infarction No family hx of      Coronary Artery Disease Early Onset No family hx of      Ovarian Cancer No family hx of      Colon Cancer No family hx of      Depression Mother      Anxiety Disorder Mother        SOCIAL HISTORY:   Social History     Socioeconomic History     Marital status: Single   Occupational History     Occupation: On disability   Tobacco Use     Smoking status: Never     Smokeless tobacco: Never   Substance and Sexual Activity     Alcohol use: No     Alcohol/week: 0.0 standard drinks of alcohol     Drug use: No     Sexual activity: Not Currently     Partners: Male     Birth control/protection: I.U.D.     Comment: IUD - Mirena - placed July, 2015   Social History Narrative    Single.    Living in independent living portion of [employer].    On disability.    No regular exercise.        VITALS:  /85   Pulse 97   Temp 98.4  F (36.9  C) (Oral)   Resp 16   Ht 1.575 m (5' 2\")   Wt 136.1 kg (300 lb)   SpO2 97%   BMI 54.87 kg/m      PHYSICAL EXAM    Physical Exam      LAB:  All pertinent labs reviewed and interpreted.  Labs Ordered and Resulted from Time of ED Arrival to Time of ED Departure   ROUTINE UA WITH MICROSCOPIC REFLEX TO CULTURE - Abnormal       Result Value    Color Urine Yellow      Appearance Urine Clear      Glucose Urine Negative      Bilirubin Urine Negative      Ketones Urine Trace (*)     Specific Gravity Urine 1.032 (*)     Blood Urine Negative      pH Urine 6.0      Protein Albumin Urine 30 (*)     Urobilinogen Urine <2.0      Nitrite Urine " Negative      Leukocyte Esterase Urine Negative      Bacteria Urine Few (*)     Mucus Urine Present (*)     RBC Urine <1      WBC Urine 1      Squamous Epithelials Urine 1     COMPREHENSIVE METABOLIC PANEL - Normal    Sodium 143      Potassium 4.0      Chloride 104      Carbon Dioxide (CO2) 29      Anion Gap 10      Urea Nitrogen 11      Creatinine 0.61      Calcium 9.5      Glucose 114      Alkaline Phosphatase 86      AST 22      ALT 31      Protein Total 6.8      Albumin 3.8      Bilirubin Total 0.3      GFR Estimate >90     LIPASE - Normal    Lipase 37     HCG QUALITATIVE PREGNANCY - Normal    hCG Serum Qualitative Negative     CBC WITH PLATELETS AND DIFFERENTIAL    WBC Count 8.7      RBC Count 4.42      Hemoglobin 12.8      Hematocrit 38.4      MCV 87      MCH 29.0      MCHC 33.3      RDW 12.9      Platelet Count 290      % Neutrophils 67      % Lymphocytes 21      % Monocytes 7      % Eosinophils 4      % Basophils 1      % Immature Granulocytes 0      NRBCs per 100 WBC 0      Absolute Neutrophils 5.9      Absolute Lymphocytes 1.8      Absolute Monocytes 0.6      Absolute Eosinophils 0.3      Absolute Basophils 0.0      Absolute Immature Granulocytes 0.0      Absolute NRBCs 0.0         RADIOLOGY:  Reviewed all pertinent imaging. Please see official radiology report.  Abdomen XR, 2 vw, flat and upright   Final Result   IMPRESSION: Cholecystectomy clips and IUD again seen. No new or unexpected radiopaque foreign bodies. Bowel gas pattern is normal. Levoscoliosis of the lower thoracic spine redemonstrated.              Efraín Macedo M.D.  Emergency Medicine  [facility] EMERGENCY ROOM  [address] [address] [phone]  Dept: [phone]     Efraín Macedo MD  04/30/23 [id number]

## 2023-05-01 NOTE — TELEPHONE ENCOUNTER
RECORDS RECEIVED FROM: internal     DATE RECEIVED: 6.29.23     NOTES STATUS DETAILS   OFFICE NOTE from referring provider       OFFICE NOTE from other specialist       DISCHARGE SUMMARY from hospital       DISCHARGE REPORT from the ER internal  7/29/22 Broddy   7/15/22 Anw    MEDICATION LIST internal      IMAGING  (NEED IMAGES AND REPORTS)       CT SCAN internal /ce Internal -2.18.23, 8/22/22, 2/8/22, 1.24.22, 3.13.21      [facility]- 1.10.23    CHEST XRAY (CXR) internal /ce Internal 2.61.23, 11.14.22, 11.3.22, 9.25.22 more in epic      [facility]- 12.15.22, 10.1.22    Scheduled 6.29.23   TESTS       PULMONARY FUNCTION TESTING (PFT) internal  Scheduled 6.29.23        Action 2.22.23 sv    Action Taken Image request sent to      [facility]   CT- 1.10.23  CXR- 11.27.22     [facility]- CXR- 12.15.22, ---received --     [facility] 10.1.22---received --     Action 5.1.23 sv    Action Taken Image request sent to   [facility]- 1.10.23  CXR- 11.27.22

## 2023-05-12 ENCOUNTER — OFFICE VISIT (OUTPATIENT)
Dept: OTOLARYNGOLOGY | Facility: CLINIC | Age: 33
End: 2023-05-12
Payer: COMMERCIAL

## 2023-05-12 VITALS
HEIGHT: 62 IN | OXYGEN SATURATION: 95 % | SYSTOLIC BLOOD PRESSURE: 125 MMHG | TEMPERATURE: 97.9 F | WEIGHT: 293 LBS | HEART RATE: 99 BPM | BODY MASS INDEX: 53.92 KG/M2 | DIASTOLIC BLOOD PRESSURE: 70 MMHG

## 2023-05-12 DIAGNOSIS — J34.89 NASAL SEPTAL PERFORATION: Primary | ICD-10-CM

## 2023-05-12 PROCEDURE — 31231 NASAL ENDOSCOPY DX: CPT | Performed by: STUDENT IN AN ORGANIZED HEALTH CARE EDUCATION/TRAINING PROGRAM

## 2023-05-12 PROCEDURE — 99213 OFFICE O/P EST LOW 20 MIN: CPT | Mod: 25 | Performed by: STUDENT IN AN ORGANIZED HEALTH CARE EDUCATION/TRAINING PROGRAM

## 2023-05-12 RX ORDER — SODIUM CHLORIDE/ALOE VERA
GEL (GRAM) NASAL 2 TIMES DAILY
Qty: 14.1 G | Refills: 11 | Status: ON HOLD | OUTPATIENT
Start: 2023-05-12 | End: 2023-10-16

## 2023-05-12 RX ORDER — ECHINACEA PURPUREA EXTRACT 125 MG
TABLET ORAL
Qty: 44 ML | Refills: 11 | Status: SHIPPED | OUTPATIENT
Start: 2023-05-12 | End: 2024-01-25

## 2023-05-12 ASSESSMENT — PAIN SCALES - GENERAL: PAINLEVEL: NO PAIN (0)

## 2023-05-12 NOTE — PROGRESS NOTES
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 mouth 3 times daily 90 capsule 0     Respiratory Therapy Supplies (NEBULIZER) BRENDAN Nebulizer device.  Albuterol nebulization every 2 hours as needed for shortness of breath or wheezing. 1 each 0     saline nasal (AYR SALINE) GEL topical gel Apply into each  nare 2 times daily Place in front of each side of your nose and breathe in to distribute gel twice daily. 14.1 g 11     Semaglutide 3 MG TABS Take 3 mg by mouth daily before breakfast Then 7mg daily for second month. Then 14 mg daily       sodium chloride (OCEAN) 0.65 % nasal spray Spray 2 sprays in each side of the nose every 3 hours while awake. 44 mL 11     SUMAtriptan (IMITREX) 25 MG tablet Take 25 mg by mouth at onset of headache for migraine May repeat in 2 hours.       valACYclovir (VALTREX) 1000 mg tablet Take 2,000 mg by mouth 2 times daily as needed Take 2 tablets by mouth two times daily for one day. Use as needed at onset of cold sore.       Amoxicillin-pot clavulanate, Hydrocodone, Hydrocodone-acetaminophen, Influenza vaccines, Latex, Oseltamivir, Penicillins, Vancomycin, Blood-group specific substance, Clavulanic acid, Fentanyl, Haemophilus b polysaccharide vaccine, Naltrexone, Other drug allergy (see comments), Oxycodone, Adhesive tape, Band-aid anti-itch, Cephalosporins, Lamotrigine, and No clinical screening - see comments  Social History     Socioeconomic History     Marital status: Single     Spouse name: Not on file     Number of children: Not on file     Years of education: Not on file     Highest education level: Not on file   Occupational History     Occupation: On disability   Tobacco Use     Smoking status: Never     Smokeless tobacco: Never   Vaping Use     Vaping status: Not on file   Substance and Sexual Activity     Alcohol use: No     Alcohol/week: 0.0 standard drinks of alcohol     Drug use: No     Sexual activity: Not Currently     Partners: Male     Birth control/protection: I.U.D.     Comment: IUD - Mirena - placed July, 2015   Other Topics Concern     Parent/sibling w/ CABG, MI or angioplasty before 65F 55M? Not Asked   Social History Narrative    Single.    Living in independent living portion of [employer] [facility].    On disability.    No regular exercise.      Social  Determinants of Health     Financial Resource Strain: Not on file   Food Insecurity: Not on file   Transportation Needs: Not on file   Physical Activity: Not on file   Stress: Not on file   Social Connections: Not on file   Intimate Partner Violence: Not on file   Housing Stability: Not on file     Family History   Problem Relation Age of Onset     Diabetes Type 2  Maternal Grandmother      Diabetes Type 2  Paternal Grandmother      Breast Cancer Paternal Grandmother      Cerebrovascular Disease Father 53     Myocardial Infarction No family hx of      Coronary Artery Disease Early Onset No family hx of      Ovarian Cancer No family hx of      Colon Cancer No family hx of      Depression Mother      Anxiety Disorder Mother        PE:  Alert and Oriented, Answering Questions Appropriately  Atraumatic, Normocephalic, Face Symmetric  Skin: Orozco 1, Skin Thickness: Medium  Facial Nerve Intact and facial movement symmetric  EOMi  Nasal Exam: On examination of her nose her nasal bones are relatively straight.  She has nice support of her nose specifically her nasal tip and supratip region.  No significant saddle deformity.  Anterior rhinoscopy reveals residual about 1.5 cm strut caudal to the nasal septal perforation which is seen.  See procedure note below.  Chin: Normal   Lips/Teeth/Toungue/Gums: Lips intact  Neck: No lymphadenopathy, no thyromegaly, trachea midline  Chest: No wheezing, cyanosis, or stridor  Card: not diaphoretic  Neuro/Psych: CN's 2-12 intact, Moves all extremities, ambulation in intact, positive affect, no notable muscle weakness          PROCEDURE:Nasal endoscopy  Indication: Nasal Endoscopy is performed to provide a more thorough evaluation of the nasal airway than seen on standard nasal speculum exam.    Performed by: Joleen Apple MD      Findings are as follows:   There is a roughly 1 cm x 1 cm inferior perforation of the cartilaginous septum with the posterior aspect being just adjacent  to the head of the inferior turbinate.  The edges are raw and crusted and the inferior edges are irritated with some blood present.  The remainder of the septum is healthy.      IMPRESSION/PLAN: Discussed is a 32-year-old female being seen for nasal septal perforation.  This was likely caused by a combination of digital trauma and trauma from using Q-tips in the nose.  We discussed what surgery to repair the nasal septal perforation would entail, including general anesthesia and open rhinoplasty approach.  She does admit to some digital picking of the area.  We discussed that I would not offer any surgery until that is under control as after a repair that mucosa is very sensitive and any trauma could cause the repair to fail.  For now, I would recommend conservative treatment with nasal saline spray and nasal saline gel in addition to avoiding any trauma with fingers or Q-tips.  We discussed that if she gets a bloody nose and cannot recommend sticking Kleenex up her nose and instead counseled her on using digital pressure to the soft part of her nose.  Our goal would be to maintain the size of this perforation and keep it healthy.  If we can avoid surgery in her that would be ideal.  I would like to see her back in 4 to 6 months to make sure the perforation is stable.    No orders of the defined types were placed in this encounter.      Photodocumentation was obtained.

## 2023-05-12 NOTE — Clinical Note
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 (40 mg) by mouth daily 90 capsule 3     ondansetron (ZOFRAN-ODT) 4 MG ODT tab Take 1 tablet (4 mg) by  mouth every 8 hours as needed for nausea 15 tablet 0     pregabalin (LYRICA) 100 MG capsule Take 1 capsule (100 mg) by mouth 3 times daily 90 capsule 0     Respiratory Therapy Supplies (NEBULIZER) BRENDAN Nebulizer device.  Albuterol nebulization every 2 hours as needed for shortness of breath or wheezing. 1 each 0     saline nasal (AYR SALINE) GEL topical gel Apply into each nare 2 times daily Place in front of each side of your nose and breathe in to distribute gel twice daily. 14.1 g 11     Semaglutide 3 MG TABS Take 3 mg by mouth daily before breakfast Then 7mg daily for second month. Then 14 mg daily       sodium chloride (OCEAN) 0.65 % nasal spray Spray 2 sprays in each side of the nose every 3 hours while awake. 44 mL 11     SUMAtriptan (IMITREX) 25 MG tablet Take 25 mg by mouth at onset of headache for migraine May repeat in 2 hours.       valACYclovir (VALTREX) 1000 mg tablet Take 2,000 mg by mouth 2 times daily as needed Take 2 tablets by mouth two times daily for one day. Use as needed at onset of cold sore.       Amoxicillin-pot clavulanate, Hydrocodone, Hydrocodone-acetaminophen, Influenza vaccines, Latex, Oseltamivir, Penicillins, Vancomycin, Blood-group specific substance, Clavulanic acid, Fentanyl, Haemophilus b polysaccharide vaccine, Naltrexone, Other drug allergy (see comments), Oxycodone, Adhesive tape, Band-aid anti-itch, Cephalosporins, Lamotrigine, and No clinical screening - see comments  Social History     Socioeconomic History     Marital status: Single     Spouse name: Not on file     Number of children: Not on file     Years of education: Not on file     Highest education level: Not on file   Occupational History     Occupation: On disability   Tobacco Use     Smoking status: Never     Smokeless tobacco: Never   Vaping Use     Vaping status: Not on file   Substance and Sexual Activity     Alcohol use: No     Alcohol/week: 0.0 standard drinks of alcohol     Drug use: No     Sexual activity:  Not Currently     Partners: Male     Birth control/protection: I.U.D.     Comment: IUD - Mirena - placed July, 2015   Other Topics Concern     Parent/sibling w/ CABG, MI or angioplasty before 65F 55M? Not Asked   Social History Narrative    Single.    Living in independent living portion of [employer] [facility].    On disability.    No regular exercise.      Social Determinants of Health     Financial Resource Strain: Not on file   Food Insecurity: Not on file   Transportation Needs: Not on file   Physical Activity: Not on file   Stress: Not on file   Social Connections: Not on file   Intimate Partner Violence: Not on file   Housing Stability: Not on file     Family History   Problem Relation Age of Onset     Diabetes Type 2  Maternal Grandmother      Diabetes Type 2  Paternal Grandmother      Breast Cancer Paternal Grandmother      Cerebrovascular Disease Father 53     Myocardial Infarction No family hx of      Coronary Artery Disease Early Onset No family hx of      Ovarian Cancer No family hx of      Colon Cancer No family hx of      Depression Mother      Anxiety Disorder Mother        PE:  Alert and Oriented, Answering Questions Appropriately  Atraumatic, Normocephalic, Face Symmetric  Skin: Orozco 1, Skin Thickness: Medium  Facial Nerve Intact and facial movement symmetric  EOMi  Nasal Exam: On examination of her nose her nasal bones are relatively straight.  She has nice support of her nose specifically her nasal tip and supratip region.  No significant saddle deformity.  Anterior rhinoscopy reveals residual about 1.5 cm strut caudal to the nasal septal perforation which is seen.  See procedure note below.  Chin: Normal   Lips/Teeth/Toungue/Gums: Lips intact  Neck: No lymphadenopathy, no thyromegaly, trachea midline  Chest: No wheezing, cyanosis, or stridor  Card: not diaphoretic  Neuro/Psych: CN's 2-12 intact, Moves all extremities, ambulation in intact, positive affect, no notable muscle weakness           PROCEDURE:Nasal endoscopy  Indication: Nasal Endoscopy is performed to provide a more thorough evaluation of the nasal airway than seen on standard nasal speculum exam.    Performed by: Joleen Apple MD      Findings are as follows:   There is a roughly 1 cm x 1 cm inferior perforation of the cartilaginous septum with the posterior aspect being just adjacent to the head of the inferior turbinate.  The edges are raw and crusted and the inferior edges are irritated with some blood present.  The remainder of the septum is healthy.      IMPRESSION/PLAN: Discussed is a 32-year-old female being seen for nasal septal perforation.  This was likely caused by a combination of digital trauma and trauma from using Q-tips in the nose.  We discussed what surgery to repair the nasal septal perforation would entail, including general anesthesia and open rhinoplasty approach.  She does admit to some digital picking of the area.  We discussed that I would not offer any surgery until that is under control as after a repair that mucosa is very sensitive and any trauma could cause the repair to fail.  For now, I would recommend conservative treatment with nasal saline spray and nasal saline gel in addition to avoiding any trauma with fingers or Q-tips.  We discussed that if she gets a bloody nose and cannot recommend sticking Kleenex up her nose and instead counseled her on using digital pressure to the soft part of her nose.  Our goal would be to maintain the size of this perforation and keep it healthy.  If we can avoid surgery in her that would be ideal.  I would like to see her back in 4 to 6 months to make sure the perforation is stable.    No orders of the defined types were placed in this encounter.      Photodocumentation was obtained.              Again, thank you for allowing me to participate in the care of your patient.      Sincerely,    Joleen Apple MD

## 2023-05-12 NOTE — NURSING NOTE
"Chief Complaint   Patient presents with     Consult     Nasal septum perforation     Blood pressure 125/70, pulse 99, temperature 97.9  F (36.6  C), height 1.575 m (5' 2\"), weight 137.9 kg (304 lb), SpO2 95 %, not currently breastfeeding.    Joon Hatch LPN    "

## 2023-05-17 ENCOUNTER — TELEPHONE (OUTPATIENT)
Dept: OTOLARYNGOLOGY | Facility: CLINIC | Age: 33
End: 2023-05-17
Payer: COMMERCIAL

## 2023-05-17 NOTE — TELEPHONE ENCOUNTER
Left vm for guardian in regards to switching pts appointment time to 8 am on 6/30. Writers call back number provided on vm.

## 2023-05-23 ENCOUNTER — APPOINTMENT (OUTPATIENT)
Dept: ULTRASOUND IMAGING | Facility: CLINIC | Age: 33
DRG: 074 | End: 2023-05-23
Attending: FAMILY MEDICINE
Payer: COMMERCIAL

## 2023-05-23 ENCOUNTER — HOSPITAL ENCOUNTER (INPATIENT)
Facility: CLINIC | Age: 33
LOS: 1 days | Discharge: HOME | DRG: 074 | End: 2023-05-24
Attending: FAMILY MEDICINE | Admitting: PSYCHIATRY & NEUROLOGY
Payer: COMMERCIAL

## 2023-05-23 ENCOUNTER — APPOINTMENT (OUTPATIENT)
Dept: MRI IMAGING | Facility: CLINIC | Age: 33
DRG: 074 | End: 2023-05-23
Attending: FAMILY MEDICINE
Payer: COMMERCIAL

## 2023-05-23 DIAGNOSIS — F33.1 MAJOR DEPRESSIVE DISORDER, RECURRENT EPISODE, MODERATE (H): ICD-10-CM

## 2023-05-23 DIAGNOSIS — M21.372 FOOT DROP, LEFT: Primary | Chronic | ICD-10-CM

## 2023-05-23 DIAGNOSIS — M54.50 RIGHT LOW BACK PAIN, UNSPECIFIED CHRONICITY, UNSPECIFIED WHETHER SCIATICA PRESENT: ICD-10-CM

## 2023-05-23 DIAGNOSIS — R20.2 RIGHT LEG PARESTHESIAS: ICD-10-CM

## 2023-05-23 DIAGNOSIS — R29.898 RIGHT LEG WEAKNESS: ICD-10-CM

## 2023-05-23 PROCEDURE — 93971 EXTREMITY STUDY: CPT | Mod: RT

## 2023-05-23 PROCEDURE — 250N000011 HC RX IP 250 OP 636: Performed by: FAMILY MEDICINE

## 2023-05-23 PROCEDURE — 250N000013 HC RX MED GY IP 250 OP 250 PS 637: Performed by: FAMILY MEDICINE

## 2023-05-23 PROCEDURE — 72148 MRI LUMBAR SPINE W/O DYE: CPT

## 2023-05-23 RX ORDER — ONDANSETRON 4 MG/1
4 TABLET, ORALLY DISINTEGRATING ORAL ONCE
Status: COMPLETED | OUTPATIENT
Start: 2023-05-23 | End: 2023-05-23

## 2023-05-23 RX ORDER — LORAZEPAM 1 MG/1
1 TABLET ORAL ONCE
Status: COMPLETED | OUTPATIENT
Start: 2023-05-23 | End: 2023-05-23

## 2023-05-23 RX ADMIN — LORAZEPAM 1 MG: 1 TABLET ORAL at 22:06

## 2023-05-23 RX ADMIN — ONDANSETRON 4 MG: 4 TABLET, ORALLY DISINTEGRATING ORAL at 21:32

## 2023-05-23 ASSESSMENT — ACTIVITIES OF DAILY LIVING (ADL)
ADLS_ACUITY_SCORE: 42
ADLS_ACUITY_SCORE: 38

## 2023-05-24 VITALS
HEIGHT: 62 IN | SYSTOLIC BLOOD PRESSURE: 138 MMHG | WEIGHT: 293 LBS | OXYGEN SATURATION: 94 % | HEART RATE: 95 BPM | DIASTOLIC BLOOD PRESSURE: 84 MMHG | TEMPERATURE: 98.2 F | RESPIRATION RATE: 18 BRPM | BODY MASS INDEX: 53.92 KG/M2

## 2023-05-24 PROBLEM — M54.50 RIGHT LOW BACK PAIN, UNSPECIFIED CHRONICITY, UNSPECIFIED WHETHER SCIATICA PRESENT: Status: ACTIVE | Noted: 2023-05-24

## 2023-05-24 PROBLEM — R20.2 RIGHT LEG PARESTHESIAS: Status: ACTIVE | Noted: 2023-05-24

## 2023-05-24 PROBLEM — M21.372 FOOT DROP, LEFT: Chronic | Status: ACTIVE | Noted: 2023-05-24

## 2023-05-24 PROBLEM — R29.898 RIGHT LEG WEAKNESS: Status: ACTIVE | Noted: 2023-05-24

## 2023-05-24 LAB
ALBUMIN SERPL BCG-MCNC: 4.2 G/DL (ref 3.5–5.2)
ALP SERPL-CCNC: 86 U/L (ref 35–104)
ALT SERPL W P-5'-P-CCNC: 20 U/L (ref 10–35)
ANION GAP SERPL CALCULATED.3IONS-SCNC: 12 MMOL/L (ref 7–15)
AST SERPL W P-5'-P-CCNC: 22 U/L (ref 10–35)
BILIRUB SERPL-MCNC: 0.2 MG/DL
BUN SERPL-MCNC: 11 MG/DL (ref 6–20)
CALCIUM SERPL-MCNC: 9.3 MG/DL (ref 8.6–10)
CHLORIDE SERPL-SCNC: 105 MMOL/L (ref 98–107)
CREAT SERPL-MCNC: 0.59 MG/DL (ref 0.51–0.95)
DEPRECATED HCO3 PLAS-SCNC: 23 MMOL/L (ref 22–29)
GFR SERPL CREATININE-BSD FRML MDRD: >90 ML/MIN/1.73M2
GLUCOSE SERPL-MCNC: 123 MG/DL (ref 70–99)
PLATELET # BLD AUTO: 269 10E3/UL (ref 150–450)
POTASSIUM SERPL-SCNC: 4.1 MMOL/L (ref 3.4–5.3)
PROT SERPL-MCNC: 7 G/DL (ref 6.4–8.3)
SODIUM SERPL-SCNC: 140 MMOL/L (ref 136–145)

## 2023-05-24 PROCEDURE — 250N000013 HC RX MED GY IP 250 OP 250 PS 637: Performed by: STUDENT IN AN ORGANIZED HEALTH CARE EDUCATION/TRAINING PROGRAM

## 2023-05-24 PROCEDURE — 85049 AUTOMATED PLATELET COUNT: CPT

## 2023-05-24 PROCEDURE — 36415 COLL VENOUS BLD VENIPUNCTURE: CPT | Performed by: STUDENT IN AN ORGANIZED HEALTH CARE EDUCATION/TRAINING PROGRAM

## 2023-05-24 PROCEDURE — 99284 EMERGENCY DEPT VISIT MOD MDM: CPT | Performed by: FAMILY MEDICINE

## 2023-05-24 PROCEDURE — 250N000011 HC RX IP 250 OP 636: Performed by: STUDENT IN AN ORGANIZED HEALTH CARE EDUCATION/TRAINING PROGRAM

## 2023-05-24 PROCEDURE — 80053 COMPREHEN METABOLIC PANEL: CPT | Performed by: STUDENT IN AN ORGANIZED HEALTH CARE EDUCATION/TRAINING PROGRAM

## 2023-05-24 PROCEDURE — 120N000002 HC R&B MED SURG/OB UMMC

## 2023-05-24 PROCEDURE — 99239 HOSP IP/OBS DSCHRG MGMT >30: CPT | Mod: GC | Performed by: STUDENT IN AN ORGANIZED HEALTH CARE EDUCATION/TRAINING PROGRAM

## 2023-05-24 PROCEDURE — 99285 EMERGENCY DEPT VISIT HI MDM: CPT | Mod: 25

## 2023-05-24 RX ORDER — HEPARIN SODIUM 5000 [USP'U]/.5ML
5000 INJECTION, SOLUTION INTRAVENOUS; SUBCUTANEOUS EVERY 12 HOURS
Status: DISCONTINUED | OUTPATIENT
Start: 2023-05-24 | End: 2023-05-24 | Stop reason: HOSPADM

## 2023-05-24 RX ORDER — AMOXICILLIN 250 MG
1 CAPSULE ORAL 2 TIMES DAILY PRN
Status: DISCONTINUED | OUTPATIENT
Start: 2023-05-24 | End: 2023-05-24 | Stop reason: HOSPADM

## 2023-05-24 RX ORDER — AMOXICILLIN 250 MG
2 CAPSULE ORAL 2 TIMES DAILY PRN
Status: DISCONTINUED | OUTPATIENT
Start: 2023-05-24 | End: 2023-05-24 | Stop reason: HOSPADM

## 2023-05-24 RX ORDER — ALBUTEROL SULFATE 90 UG/1
2 AEROSOL, METERED RESPIRATORY (INHALATION) EVERY 6 HOURS PRN
Status: DISCONTINUED | OUTPATIENT
Start: 2023-05-24 | End: 2023-05-24 | Stop reason: HOSPADM

## 2023-05-24 RX ORDER — PANTOPRAZOLE SODIUM 40 MG/1
40 TABLET, DELAYED RELEASE ORAL
Status: DISCONTINUED | OUTPATIENT
Start: 2023-05-24 | End: 2023-05-24 | Stop reason: HOSPADM

## 2023-05-24 RX ORDER — METFORMIN HCL 500 MG
1000 TABLET, EXTENDED RELEASE 24 HR ORAL
Status: DISCONTINUED | OUTPATIENT
Start: 2023-05-24 | End: 2023-05-24 | Stop reason: HOSPADM

## 2023-05-24 RX ORDER — HYDROXYCHLOROQUINE SULFATE 200 MG/1
200 TABLET, FILM COATED ORAL 2 TIMES DAILY
Status: DISCONTINUED | OUTPATIENT
Start: 2023-05-24 | End: 2023-05-24 | Stop reason: HOSPADM

## 2023-05-24 RX ORDER — PREGABALIN 100 MG/1
100 CAPSULE ORAL 3 TIMES DAILY
Status: DISCONTINUED | OUTPATIENT
Start: 2023-05-24 | End: 2023-05-24 | Stop reason: HOSPADM

## 2023-05-24 RX ORDER — DESVENLAFAXINE 50 MG/1
50 TABLET, FILM COATED, EXTENDED RELEASE ORAL DAILY
Status: DISCONTINUED | OUTPATIENT
Start: 2023-05-24 | End: 2023-05-24 | Stop reason: HOSPADM

## 2023-05-24 RX ORDER — MONTELUKAST SODIUM 10 MG/1
10 TABLET ORAL EVERY EVENING
Status: DISCONTINUED | OUTPATIENT
Start: 2023-05-24 | End: 2023-05-24 | Stop reason: HOSPADM

## 2023-05-24 RX ORDER — LIDOCAINE 40 MG/G
CREAM TOPICAL
Status: DISCONTINUED | OUTPATIENT
Start: 2023-05-24 | End: 2023-05-24 | Stop reason: HOSPADM

## 2023-05-24 RX ORDER — FUROSEMIDE 20 MG
20 TABLET ORAL DAILY
Status: DISCONTINUED | OUTPATIENT
Start: 2023-05-24 | End: 2023-05-24 | Stop reason: HOSPADM

## 2023-05-24 RX ORDER — ACETAMINOPHEN 325 MG/1
975 TABLET ORAL EVERY 8 HOURS PRN
Status: DISCONTINUED | OUTPATIENT
Start: 2023-05-24 | End: 2023-05-24 | Stop reason: HOSPADM

## 2023-05-24 RX ADMIN — PREGABALIN 100 MG: 100 CAPSULE ORAL at 08:08

## 2023-05-24 RX ADMIN — PANTOPRAZOLE SODIUM 40 MG: 40 TABLET, DELAYED RELEASE ORAL at 08:08

## 2023-05-24 RX ADMIN — FUROSEMIDE 20 MG: 20 TABLET ORAL at 08:08

## 2023-05-24 RX ADMIN — METFORMIN ER 500 MG 1000 MG: 500 TABLET ORAL at 08:08

## 2023-05-24 RX ADMIN — HEPARIN SODIUM 5000 UNITS: 5000 INJECTION, SOLUTION INTRAVENOUS; SUBCUTANEOUS at 13:13

## 2023-05-24 RX ADMIN — HYDROXYCHLOROQUINE SULFATE 200 MG: 200 TABLET ORAL at 08:08

## 2023-05-24 RX ADMIN — DESVENLAFAXINE SUCCINATE 50 MG: 50 TABLET, EXTENDED RELEASE ORAL at 08:08

## 2023-05-24 ASSESSMENT — ACTIVITIES OF DAILY LIVING (ADL)
ADLS_ACUITY_SCORE: 42

## 2023-05-24 NOTE — H&P
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 sensation to pinprick in the anterior aspect of the right thigh.  Coordination: FNF without ataxia or dysmetria.  Unable to assess lower extremities " due to body habitus/weakness.  Gait: Deferred    Investigations:    I have personally reviewed pertinent labs, tests, and radiology images. Discussion of notable findings is included under Impression.     Did the patient transfer from an outside hospital?   No    Assessment:  Nevin Alvarado is a 32 year old woman with past medical history significant for endometriosis, PE, PCOS, hypertension, T2DM, PTSD, recurrent foreign body ingestion and CIDP who presents for evaluation of lower extremity weakness and leg pain.    Patient reports progressive weakness over the last 2 months in her lower extremities with increasing need for walker.  History of CIDP as noted in the chart however this work-up was done at an outside facility and medical records are not currently available.  Imaging of the lumbar spine with MRI was obtained that showed cauda equina nerve roots that appear thickened and enlarged diffusely, as well as lumbosacral plexus nerve roots that appear thickened and enlarged with small cystic changes bilaterally.  On exam, patient has weakness throughout the bilateral lower extremities, decreased sensation to light touch, vibration, and pinprick.  Unable to appreciate reflexes in bilateral upper and lower extremities, however this may be due to body habitus.    Given his progressive weakness impacting patient's ability to walk, as well as imaging findings, patient should be admitted for further work-up.  EMG/LP can be considered by the day team.  Will be important to obtain patient's past medical records from [facility].    Plan:  #Bilateral lower extremity weakness  #Right thigh pain  - Admit to general neurology service  - Day team to consider EMG and lumbar puncture  - PT/OT evaluation    #Neuropathy  #History of CIDP  - PTA pregabalin    #T2DM  - PTA metformin  - PTA semaglutide    #Asthma  - PTA montelukast  - Albuterol inhaler as needed    #GERD  - PTA omeprazole    #Borderline personality  disorder  #MDD  #PTSD  #Recurrent foreign body ingestion  - One-to-one attendant for now  - PTA brexpiprazole  - PTA desvenlafaxine      FEN: Regular diet  Malnutrition: Patient does not meet two of the above criteria necessary for diagnosing malnutrition  PPx: VTE, heparin subq  Code: FULL, discussed with patient on 5/24/23    Patient was discussed with Dr. Perez.    Felipe Gordon MD  Neurology Resident, PGY3

## 2023-05-24 NOTE — PROGRESS NOTES
[facility]  NEUROLOGY DISCHARGE SUMMARY    Patient Name:  Nevin Alvarado  MRN:  [id number]      :  1990      Date of Admission:  2023  Date of Discharge:  2023  2:00 PM  Admitting Physician:  LAUREL CURTIS MD  Discharge Physician:  LAUREL CURTIS MD  Primary Care Provider:   Latonya Knight  Discharge Disposition:   Discharged to home    Admission Diagnoses:  #Bilateral lower extremity weakness  #Right thigh pain    ~Chronic conditions~  #CIDP, stable without treatment   #T2DM  #Asthma  #GERD  #Borderline personality disorder  #MDD  #PTSD  #Recurrent foreign body ingestion    Discharge Diagnoses:    -Meralgia paraesthetica    ~Chronic condition on-going as above~    Recommendations and Follow-up:  -Follow-up with PTA Neurologist as previously planned  -Provided with referral for AFO brace fitting   >Patient reports her current brace does not fit    Brief History of Illness:   Nevin Alvarado is a 32 year old woman with past medical history significant for endometriosis, PE, PCOS, hypertension, T2DM, PTSD, recurrent foreign body ingestion and CIDP who presents for evaluation of lower extremity weakness and leg pain.    ~Please see H&P dated 23 for further details~    Hospital Course:  On presentation to the ED, patient was seen by the overnight resident team, and an MR lumbar spine was ordered, significant for thickened lumbosacral plexus nerve roots, which is grossly consistent with prior imaging per review of [facility] notes.     On further discussion with Ms. Alvarado, it was apparent that her lower-extremity weakness has been generally stable recently / slowly progressive over the last several years, consistent with her known CIDP diagnosis for which she seems to be receiving excellent care at [facility].  Exam, in addition to findings consistent with known CIDP, was consistent with a diagnosis of meralgia paresthetica based on  "sensory loss over the anterior medial aspect of her L thigh, and elicitation of neuropathic pain and paresthesia on palpation of ASIS / inguinal ligament.  Patient was discharged home in stable condition, with a referral for AFO brace fitting (given that her current brace is ineffective and poorly fit, per her report) and plan to follow-up with Aj as previously planned.      Discharged in stable condition.  No medication changes were made.       Pertinent Investigations:  #MRI Lumbar Spine w/o contrast  1.  Cauda equina nerve roots appear thickened and enlarged diffusely. Lumbosacral plexus nerve roots bilaterally appears thickened enlarged with small cystic changes. Expanded differential includes neurofibromatosis type I, Charcot-Monique-Tooth syndrome,   chronic inflammatory demyelinating polyneuropathy.     2.  Transitional lumbosacral anatomy. Last well-formed disc space labeled L5-S1 with right L5 partial sacralization. Right L5 partial sacralization with pseudoarthrosis right sacrum demonstrates mild adjacent bone marrow edema and fatty marrow changes.   Please correlate for Bertolotti syndrome.     3.  No significant degenerative changes.     4.  No significant thecal sac stenosis.     5.  No significant neural foraminal stenosis.    Consultations:    None    Discharge physical examination:   Vitals: /84   Pulse 95   Temp 98.2  F (36.8  C) (Oral)   Resp 18   Ht 1.575 m (5' 2\")   Wt 137.9 kg (304 lb)   SpO2 94%   BMI 55.60 kg/m     General:  Lying in bed, in no acute distress.  Well groomed, pleasant, engaged.  HEENT: Neck with normal range of motion, no visible abnormality.  Oropharynx pink and moist with no visible lesion.   CV: Extremities warm and well perfused.  Pulmonary: Breathing comfortably on room air, speaking in full sentences, no cough, no accessory muscle use.  Abdominal: Non-distended.  Integumentary: Skin intact, warm, dry.  No rashes appreciated on exposed skin.    Neurologic:    "  Mental Status: Spontaneously alert, attentive, and oriented. No deficits of memory or fund of knowledge apparent to conversation. Language use grossly normal: no word-finding difficulty, speech clear and fluent, with no paraphasic errors.     Cranial Nerves: Pupils directly and consensually reactive bilaterally, negative swinging flashlight test. No anisocoria. Extraocular movements intact with normal smooth pursuit, no nystagmus.  Facial movements symmetric. Hearing intact to conversation. Tongue movements and soft palate elevation intact, without fasciculations or deviation.  No dysarthria. Shoulder shrug strong.     Motor:    LE: Strength 4/5 and approximately equal bilaterally in hip flexion, extension, and ab/adduction. 4/5 on plantar/dorsiflexion of the R foot. 2/5 on dorsiflexion on the L.  UE: Strength 5/5 and equal bilaterally in arm flexion/extension, and wrist palmar/dorsiflexion.  Reflexes: areflexic at achillies, patella; 1+ at biceps, brachioradialis.       Sensory: Diminished pinprick, light touch, and temperature sensation over the anterior / lateral aspect of the R thigh.  Neuropathic pain and paresthesia elicited on palpation of ASIS.  Sensation relatively intact distally and proximally, although she does have a complex pattern of sensory loss in all domains in her bilateral lower extremities c/w CIDP.       Movements: No tremors or other abnormal movements observed.  Finger-nose-finger and heel-to-shin without dysmetria.       Gait: Uses walker at baseline.  Stooped, cautious, broad-based with L foot drop.    Discharge Medications:  Discharge Medication List as of 5/24/2023  1:52 PM        CONTINUE these medications which have NOT CHANGED    Details   albuterol (PROAIR HFA/PROVENTIL HFA/VENTOLIN HFA) 108 (90 Base) MCG/ACT inhaler Inhale 2 puffs into the lungs every 6 hours as needed for shortness of breath / dyspnea or wheezing, Disp-18 g, R-0, Local PrintPharmacy may dispense brand covered by  insurance (Proair, or proventil or ventolin or generic albuterol inhaler)      albuterol (PROVENTIL) (2.5 MG/3ML) 0.083% neb solution Take 2.5 mg by nebulization every 6 hours as needed for shortness of breath or wheezing, Historical      alum & mag hydroxide-simethicone (MAALOX MAX) 400-400-40 MG/5ML SUSP suspension Take 15 mLs by mouth every 6 hours as needed for heartburn or other (sore throat), Disp-355 mL, R-0, E-Prescribe      BANOPHEN 2-0.1 % external cream Apply 1 applicator topically 2 times daily as needed for itchingDAWHistorical      brexpiprazole (REXULTI) 2 MG tablet Take 2 mg by mouth every evening, Historical      busPIRone (BUSPAR) 10 MG tablet Take 2 tablets (20 mg) by mouth 3 times daily, Disp-180 tablet, R-0, E-Prescribe      cetirizine (ZYRTEC) 10 MG tablet Take 1 tablet (10 mg) by mouth daily, Disp-30 tablet, R-0, E-Prescribe      Cholecalciferol (D3 HIGH POTENCY) 25 MCG (1000 UT) CAPS Take 50 mcg by mouth daily, Historical      desvenlafaxine (PRISTIQ) 100 MG 24 hr tablet Take 1 tablet (100 mg) by mouth daily, Disp-30 tablet, R-0, E-Prescribe      ferrous sulfate (FEROSUL) 325 (65 Fe) MG tablet Take 1 tablet (325 mg) by mouth daily (with breakfast), Disp-30 tablet, R-0, E-Prescribe      fluocinonide (LIDEX) 0.05 % external cream Apply 1 Application topically 2 times daily as needed Apply thinly to knee 2 times daily, Monday through Fridays, off on weekends as needed. Avoid face and skin folds.Historical      furosemide (LASIX) 20 MG tablet Take 20 mg by mouth daily, Historical      hydroxychloroquine (PLAQUENIL) 200 MG tablet Take 1 tablet (200 mg) by mouth 2 times daily, Disp-30 tablet, R-0, E-Prescribe      ibuprofen (ADVIL/MOTRIN) 600 MG tablet Take 1 tablet (600 mg) by mouth every 8 hours as needed for moderate pain, Disp-24 tablet, R-0, E-Prescribe      metFORMIN (GLUCOPHAGE XR) 500 MG 24 hr tablet Take 1,000 mg by mouth daily (with breakfast), Historical      montelukast (SINGULAIR) 10  MG tablet Take 10 mg by mouth every evening, Historical      omeprazole (PRILOSEC) 40 MG DR capsule Take 1 capsule (40 mg) by mouth daily, Disp-90 capsule, R-3, E-Prescribe      ondansetron (ZOFRAN-ODT) 4 MG ODT tab Take 1 tablet (4 mg) by mouth every 8 hours as needed for nausea, Disp-15 tablet, R-0, E-Prescribe      pregabalin (LYRICA) 100 MG capsule Take 1 capsule (100 mg) by mouth 3 times daily, Disp-90 capsule, R-0, E-Prescribe      saline nasal (AYR SALINE) GEL topical gel Apply into each nare 2 times daily Place in front of each side of your nose and breathe in to distribute gel twice daily.Disp-14.1 g, R-11Local Print      Semaglutide 3 MG TABS Take 3 mg by mouth daily before breakfast Then 7mg daily for second month. Then 14 mg daily, Historical      SUMAtriptan (IMITREX) 25 MG tablet Take 25 mg by mouth at onset of headache for migraine May repeat in 2 hours., Historical      valACYclovir (VALTREX) 1000 mg tablet Take 2,000 mg by mouth 2 times daily as needed Take 2 tablets by mouth two times daily for one day. Use as needed at onset of cold sore., Historical      Lidocaine (LIDOCARE) 4 % Patch Place 1 patch onto the skin every 24 hours To prevent lidocaine toxicity, patient should be patch free for 12 hrs daily.Disp-4 patch, [id number]      meclizine (ANTIVERT) 25 MG tablet Take 1 tablet (25 mg) by mouth every 6 hours as needed for dizziness, Disp-30 tablet, R-0, E-Prescribe      medroxyPROGESTERone (PROVERA) 10 MG tablet Take 1 tablet (10 mg) by mouth daily, Disp-10 tablet, R-0, E-Prescribe      OLANZapine (ZYPREXA) 2.5 MG tablet Take 1.25 mg by mouth daily (with dinner) At 5pm, Historical      Respiratory Therapy Supplies (NEBULIZER) BRENDAN Nebulizer device.  Albuterol nebulization every 2 hours as needed for shortness of breath or wheezing., Disp-1 each, R-0, Local PrintInclude tubing and mask for age please.      sodium chloride (OCEAN) 0.65 % nasal spray Spray 2 sprays in each side of the nose  every 3 hours while awake., Disp-44 mL, R-11, Local Print             Discharge follow up and instructions:     Reason for your hospital stay    You were seen for neuropathic pain and sensory loss consistent with meralgia paresthetica     Activity    Your activity upon discharge: activity as tolerated     Follow Up ([facility])    Please follow-up as previously planned with Aj neurology.  We have additionally placed a referral for an evaluation with [facility] for a new brace, if you cannot get your current brace to work please feel free to make use of this referral!    Appointments on [place] and/or [facility] (with [facility] or [facility] provider or service). Call [phone] if you haven't heard regarding these appointments within 7 days of discharge.     Orthotics and Prosthetics DME Orthotic; AFO (Ankle Foot Orthosis); Left; Custom    I, the undersigned, certify that the above prescribed supplies are medically necessary for this patient and is both reasonable and necessary in reference to accepted standards of medical and necessary in reference to accepted standards of medical practice in the treatment of this patient's condition and is not prescribed as a convenience.      Diet    Follow this diet upon discharge: Orders Placed This Encounter      Combination Diet Regular Diet Adult       Patient discussed with Dr. Cristofer Frederick MD, MS  PGY-1 Neurology

## 2023-05-24 NOTE — ED NOTES
Pt requested access to power outlet to keep laptop charged so she can do her therapy homework prn. Pt has been cooperative with staff and is not here related to mental health issues. Pt contracted to notify staff if she starts feeling anxious.

## 2023-05-24 NOTE — ED PROVIDER NOTES
Patient signed out to me by my colleague Dr. Ragsdale.    Nevin Alvarado is a 32 year old female who with past medical history of endometriosis, PE, PCOS, hypertension, diabetes mellitus II and PTSD presents to the [place] ER from [facility] for neurology evaluation and for further evaluation of leg pain, tingling, numbness.  Patient had MRI of the lumbar spine had [facility].  I personally reviewed MRI which demonstrates cauda equina nerve roots thickened and marginally diffuse.    Upon arrival patient is nontoxic-appearing, afebrile, no distress.  Patient hemodynamically stable vital signs within normal limits.  Patient here with right leg pain, decreased sensation to right anterior thigh, and some slight weakness to her right lower extremity.    I discussed with neurology upon arrival to the emergency department.  Neurology evaluated the patient in the emergency department. Neurology will admit to their service for further evaluation and work-up.  Patient understands and agrees with the plan.    Admission to Neurology Service         Niki Pryor MD  05/24/23 025

## 2023-05-24 NOTE — ED PROVIDER NOTES
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 Reaction: Hives; HUT Reaction Type: Allergy; HUT Severity: Med; HUT Noted: [id number]     Penicillins Anaphylaxis     HUT Reaction: Anaphylaxis; HUT Reaction Type: Allergy; HUT Severity: High; HUT Noted: [id number]     Vancomycin Itching, Swelling and Rash     Other  reaction(s): Redness  Flushed face, very itchy; HUT Comment: Flushed face, very itchy; HUT Reaction: Angioedema; HUT Reaction: Redness; HUT Severity: Med; HUT Noted: [id number]    facial     Blood-Group Specific Substance Other (See Comments)     Patient has an anti-Cw and non-specific antibodies. Blood product orders may be delayed. Draw one red top and two purple top tubes for all type/screen/crossmatch orders.  Patient has anti-Cw, anti-K (Angella), Warm auto and nonspecific antibodies. Blood products may be delayed. Draw patient 24 hours prior to transfusion. Draw one red top and two purple top tubes for all type and screen orders.     Clavulanic Acid Angioedema     Fentanyl Itching     Haemophilus B Polysaccharide Vaccine Nausea and Vomiting     Naltrexone Other (See Comments)     Reaction(s): Vivid dreams.     Other Drug Allergy (See Comments)      See original file MRN [id number]. Files are marked for merge     Oxycodone Swelling     Adhesive Tape Rash     Silicone type  Silicone type    Other reaction(s): adhesive allergy  Other reaction(s): adhesive allergy  Silicone type    Other reaction(s): adhesive allergy       Band-Aid Anti-Itch      Other reaction(s): adhesive allergy     Cephalosporins Rash     Lamotrigine Rash     Possibly associated with Lamictal.   HUT Comment: Possibly associated with Lamictal. ; HUT Reaction: Rash; HUT Reaction Type: Allergy; HUT Severity: Low; HUT Noted: [id number]     No Clinical Screening - See Comments Rash and Other (See Comments)     Silicone type  Silicone type  See original file MRN [id number]. Files are marked for merge  History of swallowing sharp metallic objects. She should not be prescribed lancets due to posed risk of swallowing.      Family History  Family History   Problem Relation Age of Onset     Diabetes Type 2  Maternal Grandmother      Diabetes Type 2  Paternal Grandmother      Breast Cancer Paternal Grandmother      Cerebrovascular Disease Father 53      "Myocardial Infarction No family hx of      Coronary Artery Disease Early Onset No family hx of      Ovarian Cancer No family hx of      Colon Cancer No family hx of      Depression Mother      Anxiety Disorder Mother      Social History   Social History     Tobacco Use     Smoking status: Never     Smokeless tobacco: Never   Substance Use Topics     Alcohol use: No     Alcohol/week: 0.0 standard drinks of alcohol     Drug use: No      Past medical history, past surgical history, medications, allergies, family history, and social history were reviewed with the patient. No additional pertinent items.      A complete review of systems was performed with pertinent positives and negatives noted in the HPI, and all other systems negative.    Physical Exam   BP: (!) 144/82  Pulse: 98  Temp: 99  F (37.2  C)  Resp: 18  Height: 157.5 cm (5' 2\")  Weight: 137.9 kg (304 lb)  SpO2: 98 %  Physical Exam  Vitals and nursing note reviewed.   Constitutional:       General: She is in acute distress.      Appearance: Normal appearance. She is well-developed.      Comments: Patient here with  otherwise nontoxic not writhing in pain   HENT:      Head: Normocephalic and atraumatic.      Nose: Nose normal.      Mouth/Throat:      Mouth: Mucous membranes are moist.      Pharynx: Oropharynx is clear.   Eyes:      General: No scleral icterus.     Conjunctiva/sclera: Conjunctivae normal.   Cardiovascular:      Rate and Rhythm: Normal rate.   Pulmonary:      Effort: Pulmonary effort is normal. No respiratory distress.   Abdominal:      General: Abdomen is flat. There is distension.      Palpations: Abdomen is soft.      Tenderness: There is no abdominal tenderness.   Musculoskeletal:         General: Tenderness present. No deformity or signs of injury.      Cervical back: Normal range of motion and neck supple. No rigidity or tenderness.   Skin:     General: Skin is warm and dry.      Capillary Refill: Capillary refill takes less than " 2 seconds.      Findings: No erythema or rash.   Neurological:      Mental Status: She is alert and oriented to person, place, and time.      Comments: Patient right leg reveals numbness to the anterior thigh and also lower leg able to extend the knee but some slight weakness noted questionable pain related otherwise low back pain also elicited.   Psychiatric:      Comments: Appropriate here in the ER           ED Course, Procedures, & Data     9:30 PM The patient was seen and examined by Aquilino Ragsdale MD. in [facility].   ER note reviewed left flank pain yesterday from outside ER.  Also triage note noted to nurse yesterday for ongoing problems    Patient ER otherwise evaluated here we did order Doppler studies of the lower extremity right leg negative for DVT.  Patient had MRI of the lumbar spine.  Findings reveal that of lower nerve thickening bilateral with some cystic changes concerning for possible Charcot-Monique-Tooth versus fibromatosis versus polyneuropathy demyelinating disorder.    Discussed case with Dr. Perez staff I talked the patient also at this point our plan will be to transfer the patient to the [place] I did talk to Dr. Hernandez in the ER who is aware.  Neurology is aware also over there.  We can page the resident to see the patient which patient is over there for further evaluation of ongoing symptoms that are worsening along with this with MRI findings of unclear significance.  Patient agrees with plan we will arrange transfer to the [place] ER.      Procedures                     Results for orders placed or performed during the hospital encounter of 05/23/23   Lumbar spine MRI w/o contrast     Status: None    Narrative    EXAM: MR LUMBAR SPINE W/O CONTRAST  LOCATION: [facility]  DATE/TIME: 5/23/2023 11:19 PM CDT    INDICATION: right leg paresthesias with weakness with low back. also eval the right groin and hip area also for  other  COMPARISON: None.  TECHNIQUE: Routine Lumbar Spine MRI without IV contrast.    FINDINGS:   Transitional lumbosacral anatomy. Last well-formed disc space labeled L5-S1 with right L5 partial sacralization. Right L5 partial sacralization with pseudoarthrosis right sacrum demonstrates mild adjacent bone marrow edema and fatty marrow changes.   Please correlate for Bertolotti syndrome.     Normal vertebral body heights.  No concerning bone marrow lesions. Conus terminates at L2. No abnormal conus signal. Unremarkable visualized bony pelvis.  No significant subluxations.    Mild lumbar dextroscoliosis. No significant degenerative disc disease. No significant facet arthropathy.    Bilateral SI degenerative joint disease.    No significant bulge or posterior disc protrusion. No significant thecal sac stenosis. No significant neural foraminal stenosis.    Cauda equina nerve roots appear thickened and enlarged diffusely. Lumbosacral plexus nerve roots bilaterally appears thickened enlarged with small cystic changes. Expanded differential includes neurofibromatosis type I, Charcot-Monique-Tooth syndrome,   chronic inflammatory demyelinating polyneuropathy.      EXTRASPINAL FINDINGS:  Left adnexal cyst measuring 3.9 cm.      Impression     IMPRESSION:  1.  Cauda equina nerve roots appear thickened and enlarged diffusely. Lumbosacral plexus nerve roots bilaterally appears thickened enlarged with small cystic changes. Expanded differential includes neurofibromatosis type I, Charcot-Monique-Tooth syndrome,   chronic inflammatory demyelinating polyneuropathy.    2.  Transitional lumbosacral anatomy. Last well-formed disc space labeled L5-S1 with right L5 partial sacralization. Right L5 partial sacralization with pseudoarthrosis right sacrum demonstrates mild adjacent bone marrow edema and fatty marrow changes.   Please correlate for Bertolotti syndrome.    3.  No significant degenerative changes.    4.  No significant thecal sac  stenosis.    5.  No significant neural foraminal stenosis.   US Lower Extremity Venous Duplex Right     Status: None    Narrative    EXAM: US LOWER EXTREMITY VENOUS DUPLEX RIGHT  LOCATION: [facility]  DATE/TIME: 5/23/2023 11:31 PM CDT    INDICATION: swelling and numbness eval for dvt  COMPARISON: None.  TECHNIQUE: Venous Duplex ultrasound of the right lower extremity with and without compression, augmentation and duplex. Color flow and spectral Doppler with waveform analysis performed.    FINDINGS: Exam includes the common femoral, femoral, popliteal, and contralateral common femoral veins as well as segmentally visualized deep calf veins and greater saphenous vein.     RIGHT: No deep vein thrombosis. No superficial thrombophlebitis. No popliteal cyst.      Impression    IMPRESSION:  1.  No deep venous thrombosis in the right lower extremity.     Medications   ondansetron (ZOFRAN ODT) ODT tab 4 mg (4 mg Oral $Given 5/23/23 2132)   LORazepam (ATIVAN) tablet 1 mg (1 mg Oral $Given 5/23/23 2206)     Labs Ordered and Resulted from Time of ED Arrival to Time of ED Departure - No data to display  US Lower Extremity Venous Duplex Right   Final Result   IMPRESSION:   1.  No deep venous thrombosis in the right lower extremity.      Lumbar spine MRI w/o contrast   Final Result    IMPRESSION:   1.  Cauda equina nerve roots appear thickened and enlarged diffusely. Lumbosacral plexus nerve roots bilaterally appears thickened enlarged with small cystic changes. Expanded differential includes neurofibromatosis type I, Charcot-Monique-Tooth syndrome,    chronic inflammatory demyelinating polyneuropathy.      2.  Transitional lumbosacral anatomy. Last well-formed disc space labeled L5-S1 with right L5 partial sacralization. Right L5 partial sacralization with pseudoarthrosis right sacrum demonstrates mild adjacent bone marrow edema and fatty marrow changes.    Please correlate for  Bertolotti syndrome.      3.  No significant degenerative changes.      4.  No significant thecal sac stenosis.      5.  No significant neural foraminal stenosis.             Critical care was not performed.     Medical Decision Making  The patient's presentation was of high complexity (an acute health issue posing potential threat to life or bodily function).    The patient's evaluation involved:  review of external note(s) from 1 sources (see separate area of note for details)  ordering and/or review of 2 test(s) in this encounter (see separate area of note for details)  review of 2 test result(s) ordered prior to this encounter (see separate area of note for details)  discussion of management or test interpretation with another health professional (see separate area of note for details)    The patient's management necessitated further care after sign-out to [facility] [place] [facility] (see their note for further management).      Assessment & Plan   32-year-old female presents to the ER with several months increasing right leg numbness and now some weakness to and pain.  Patient has low back pain also.  Patient unable to get into neurology clinic for several months.  Patient now requiring walker for ambulation all the time which seems to be increased over last few weeks.  Here with .  Patient in the ER had Doppler study the right leg negative for DVT patient MRI of the lumbar spine reveals concerns for the nerve root thickening bilateral etc. which may be a demyelinating disorder but may be other causes to such as Charcot-Monique-Tooth or neurofibromatosis.  Here in the ER patient otherwise stable with a escalating symptoms I talked to neurology staff contacted neurology resident on the [facility] attack to the ER staff also.  Patient be transferred to the [facility] and will then page neurology resident to see the patient review MRI and make recommendations.       I have reviewed the nursing notes. I have  reviewed the findings, diagnosis, plan and need for follow up with the patient.    New Prescriptions    No medications on file       Final diagnoses:   Right leg paresthesias   Right leg weakness   Right low back pain, unspecified chronicity, unspecified whether sciatica present   I, TONY BYNUM, am serving as a trained medical scribe to document services personally performed by Aquilino Ragsdale MD, based on the provider's statements to me.     IAquilino MD, was physically present and have reviewed and verified the accuracy of this note documented by TONY BYNUM.    Aquilino Ragsdale MD.   [facility] EMERGENCY DEPARTMENT  5/23/2023    This note was created at least in part by the use of dragon voice dictation system. Inadvertent typographical errors may still exist.  Aquilino Ragsdale MD.    Patient evaluated in the emergency department during the COVID-19 pandemic period. Careful attention to patients safety was addressed throughout the evaluation. Evaluation and treatment management was initiated with disposition made efficiently and appropriate as possible to minimize any risk of potential exposure to patient during this evaluation.       Aquilino Ragsdale MD  05/24/23 0023

## 2023-05-24 NOTE — DISCHARGE INSTRUCTIONS
What is meralgia paresthetica?    Meralgia paresthetica is a disorder characterized by tingling, numbness, and burning pain in the outer side of the thigh. The disorder occurs when the lateral femoral cutaneous nerve is compressed or squeezed as it exits the pelvis. This is a sensory nerve to the skin, so people with the disorder often notice a patch of skin that is sensitive to touch and sometimes painful. Meralgia paresthetica should not be associated with weakness or pain that radiates from the back.    Treatment aims to relieve symptoms and provide support. The majority of cases improve by wearing looser clothing and losing weight. Drugs used to treat neurogenic pain, such as antiseizure or antidepressant drugs, may relieve pain. In a few cases, severe pain or pain that won't go away may require surgery. In most cases, the disorder will improve with lifestyle changes or may even go away on its own.

## 2023-05-28 ENCOUNTER — HOSPITAL ENCOUNTER (EMERGENCY)
Facility: CLINIC | Age: 33
Discharge: HOME | End: 2023-05-28
Attending: FAMILY MEDICINE | Admitting: FAMILY MEDICINE
Payer: COMMERCIAL

## 2023-05-28 ENCOUNTER — APPOINTMENT (OUTPATIENT)
Dept: CT IMAGING | Facility: CLINIC | Age: 33
End: 2023-05-28
Attending: FAMILY MEDICINE
Payer: COMMERCIAL

## 2023-05-28 VITALS
SYSTOLIC BLOOD PRESSURE: 138 MMHG | WEIGHT: 293 LBS | RESPIRATION RATE: 18 BRPM | DIASTOLIC BLOOD PRESSURE: 82 MMHG | HEART RATE: 83 BPM | HEIGHT: 62 IN | OXYGEN SATURATION: 97 % | TEMPERATURE: 99.4 F | BODY MASS INDEX: 53.92 KG/M2

## 2023-05-28 DIAGNOSIS — M54.50 ACUTE BILATERAL LOW BACK PAIN WITHOUT SCIATICA: ICD-10-CM

## 2023-05-28 LAB
ALBUMIN SERPL BCG-MCNC: 4.2 G/DL (ref 3.5–5.2)
ALP SERPL-CCNC: 95 U/L (ref 35–104)
ALT SERPL W P-5'-P-CCNC: 34 U/L (ref 10–35)
ANION GAP SERPL CALCULATED.3IONS-SCNC: 11 MMOL/L (ref 7–15)
AST SERPL W P-5'-P-CCNC: 20 U/L (ref 10–35)
BASOPHILS # BLD AUTO: 0 10E3/UL (ref 0–0.2)
BASOPHILS NFR BLD AUTO: 0 %
BILIRUB SERPL-MCNC: <0.2 MG/DL
BUN SERPL-MCNC: 12.4 MG/DL (ref 6–20)
CALCIUM SERPL-MCNC: 9.7 MG/DL (ref 8.6–10)
CHLORIDE SERPL-SCNC: 103 MMOL/L (ref 98–107)
CREAT SERPL-MCNC: 0.69 MG/DL (ref 0.51–0.95)
CRP SERPL-MCNC: 11.83 MG/L
DEPRECATED HCO3 PLAS-SCNC: 27 MMOL/L (ref 22–29)
EOSINOPHIL # BLD AUTO: 0 10E3/UL (ref 0–0.7)
EOSINOPHIL NFR BLD AUTO: 0 %
ERYTHROCYTE [DISTWIDTH] IN BLOOD BY AUTOMATED COUNT: 13.2 % (ref 10–15)
ERYTHROCYTE [SEDIMENTATION RATE] IN BLOOD BY WESTERGREN METHOD: 20 MM/HR (ref 0–20)
GFR SERPL CREATININE-BSD FRML MDRD: >90 ML/MIN/1.73M2
GLUCOSE SERPL-MCNC: 133 MG/DL (ref 70–99)
HCT VFR BLD AUTO: 39.4 % (ref 35–47)
HGB BLD-MCNC: 13 G/DL (ref 11.7–15.7)
IMM GRANULOCYTES # BLD: 0.1 10E3/UL
IMM GRANULOCYTES NFR BLD: 1 %
LYMPHOCYTES # BLD AUTO: 1.7 10E3/UL (ref 0.8–5.3)
LYMPHOCYTES NFR BLD AUTO: 13 %
MCH RBC QN AUTO: 29.5 PG (ref 26.5–33)
MCHC RBC AUTO-ENTMCNC: 33 G/DL (ref 31.5–36.5)
MCV RBC AUTO: 90 FL (ref 78–100)
MONOCYTES # BLD AUTO: 0.7 10E3/UL (ref 0–1.3)
MONOCYTES NFR BLD AUTO: 6 %
NEUTROPHILS # BLD AUTO: 10.2 10E3/UL (ref 1.6–8.3)
NEUTROPHILS NFR BLD AUTO: 80 %
NRBC # BLD AUTO: 0 10E3/UL
NRBC BLD AUTO-RTO: 0 /100
PLATELET # BLD AUTO: 311 10E3/UL (ref 150–450)
POTASSIUM SERPL-SCNC: 3.8 MMOL/L (ref 3.4–5.3)
PROT SERPL-MCNC: 6.9 G/DL (ref 6.4–8.3)
RBC # BLD AUTO: 4.4 10E6/UL (ref 3.8–5.2)
SODIUM SERPL-SCNC: 141 MMOL/L (ref 136–145)
WBC # BLD AUTO: 12.8 10E3/UL (ref 4–11)

## 2023-05-28 PROCEDURE — 72131 CT LUMBAR SPINE W/O DYE: CPT

## 2023-05-28 PROCEDURE — 80053 COMPREHEN METABOLIC PANEL: CPT | Performed by: FAMILY MEDICINE

## 2023-05-28 PROCEDURE — 250N000011 HC RX IP 250 OP 636: Performed by: FAMILY MEDICINE

## 2023-05-28 PROCEDURE — 86140 C-REACTIVE PROTEIN: CPT | Performed by: FAMILY MEDICINE

## 2023-05-28 PROCEDURE — 36415 COLL VENOUS BLD VENIPUNCTURE: CPT | Performed by: FAMILY MEDICINE

## 2023-05-28 PROCEDURE — 99284 EMERGENCY DEPT VISIT MOD MDM: CPT | Mod: 25 | Performed by: FAMILY MEDICINE

## 2023-05-28 PROCEDURE — 85025 COMPLETE CBC W/AUTO DIFF WBC: CPT | Performed by: FAMILY MEDICINE

## 2023-05-28 PROCEDURE — 96372 THER/PROPH/DIAG INJ SC/IM: CPT | Performed by: FAMILY MEDICINE

## 2023-05-28 PROCEDURE — 72128 CT CHEST SPINE W/O DYE: CPT

## 2023-05-28 PROCEDURE — 85652 RBC SED RATE AUTOMATED: CPT | Performed by: FAMILY MEDICINE

## 2023-05-28 PROCEDURE — 99284 EMERGENCY DEPT VISIT MOD MDM: CPT | Performed by: FAMILY MEDICINE

## 2023-05-28 RX ORDER — KETOROLAC TROMETHAMINE 10 MG/1
10 TABLET, FILM COATED ORAL EVERY 6 HOURS PRN
Qty: 20 TABLET | Refills: 0 | Status: ON HOLD | OUTPATIENT
Start: 2023-05-28 | End: 2023-10-16

## 2023-05-28 RX ORDER — KETOROLAC TROMETHAMINE 30 MG/ML
30 INJECTION, SOLUTION INTRAMUSCULAR; INTRAVENOUS ONCE
Status: COMPLETED | OUTPATIENT
Start: 2023-05-28 | End: 2023-05-28

## 2023-05-28 RX ADMIN — KETOROLAC TROMETHAMINE 30 MG: 30 INJECTION, SOLUTION INTRAMUSCULAR; INTRAVENOUS at 21:11

## 2023-05-28 ASSESSMENT — ACTIVITIES OF DAILY LIVING (ADL)
ADLS_ACUITY_SCORE: 40

## 2023-05-28 NOTE — ED TRIAGE NOTES
Patient states that she woke up at 3am, stiff neck, spinal pain and numbness,shooting pain down arms and legs. She states it started with a pop in her back. Headache pounding (constant). Facial burning sensation, more fatigued. Sharp pain in back feels like laying on needles. Hurts to sit up (feels better laying flat). Dizziness when standing.        Triage Assessment     Row Name 05/28/23 4221       Triage Assessment (Adult)    Airway WDL WDL       Respiratory WDL    Respiratory WDL WDL       Skin Circulation/Temperature WDL    Skin Circulation/Temperature WDL WDL       Cardiac WDL    Cardiac WDL WDL       Peripheral/Neurovascular WDL    Peripheral Neurovascular WDL WDL       Cognitive/Neuro/Behavioral WDL    Cognitive/Neuro/Behavioral WDL WDL       [place]ar Coma Scale    Best Eye Response 4-->(E4) spontaneous    Best Motor Response 6-->(M6) obeys commands    Best Verbal Response 5-->(V5) oriented    [place] Coma Scale Score 15

## 2023-05-28 NOTE — ED PROVIDER NOTES
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 anti-Cw and non-specific antibodies. Blood product orders may be delayed. Draw one red top and two purple top tubes for all type/screen/crossmatch orders.  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 Blood products may be delayed. Draw patient 24 hours prior to transfusion. Draw one red top and two purple top tubes for all  type and screen orders.     Clavulanic Acid Angioedema     Fentanyl Itching     Haemophilus B Polysaccharide Vaccine Nausea and Vomiting     Naltrexone Other (See Comments)     Reaction(s): Vivid dreams.     Other Drug Allergy (See Comments)      See original file MRN [id number]. Files are marked for merge     Oxycodone Swelling     Adhesive Tape Rash     Silicone type  Silicone type    Other reaction(s): adhesive allergy  Other reaction(s): adhesive allergy  Silicone type    Other reaction(s): adhesive allergy       Band-Aid Anti-Itch      Other reaction(s): adhesive allergy     Cephalosporins Rash     Lamotrigine Rash     Possibly associated with Lamictal.   HUT Comment: Possibly associated with Lamictal. ; HUT Reaction: Rash; HUT Reaction Type: Allergy; HUT Severity: Low; HUT Noted: [id number]     No Clinical Screening - See Comments Rash and Other (See Comments)     Silicone type  Silicone type  See original file MRN [id number]. Files are marked for merge  History of swallowing sharp metallic objects. She should not be prescribed lancets due to posed risk of swallowing.      Family History  Family History   Problem Relation Age of Onset     Diabetes Type 2  Maternal Grandmother      Diabetes Type 2  Paternal Grandmother      Breast Cancer Paternal Grandmother      Cerebrovascular Disease Father 53     Myocardial Infarction No family hx of      Coronary Artery Disease Early Onset No family hx of      Ovarian Cancer No family hx of      Colon Cancer No family hx of      Depression Mother      Anxiety Disorder Mother      Social History   Social History     Tobacco Use     Smoking status: Never     Smokeless tobacco: Never   Substance Use Topics     Alcohol use: No     Alcohol/week: 0.0 standard drinks of alcohol     Drug use: No         A medically appropriate review of systems was performed with pertinent positives and negatives noted in the HPI, and all other systems negative.    Physical Exam   BP: (!)  "142/106  Pulse: 80  Temp: 99.4  F (37.4  C)  Resp: 18  Height: 157.5 cm (5' 2\")  Weight: 137.9 kg (304 lb)  SpO2: 99 %  Physical Exam  Constitutional:       General: She is not in acute distress.     Appearance: Normal appearance. She is not diaphoretic.   HENT:      Head: Atraumatic.      Mouth/Throat:      Mouth: Mucous membranes are moist.   Eyes:      General: No scleral icterus.     Conjunctiva/sclera: Conjunctivae normal.   Cardiovascular:      Rate and Rhythm: Normal rate.      Heart sounds: Normal heart sounds.   Pulmonary:      Effort: No respiratory distress.      Breath sounds: Normal breath sounds.   Abdominal:      General: Abdomen is flat.   Musculoskeletal:      Cervical back: Neck supple.      Lumbar back: Spasms present.   Skin:     General: Skin is warm.      Findings: No rash.   Neurological:      General: No focal deficit present.      Mental Status: She is alert and oriented to person, place, and time.      Sensory: No sensory deficit.      Motor: No weakness.      Coordination: Coordination normal.   Psychiatric:         Mood and Affect: Mood is anxious.         Speech: Speech normal.         Behavior: Behavior is cooperative.           ED Course, Procedures, & Data      Procedures     Mental Health Risk Assessment      PSS-3    Date and Time Over the past 2 weeks have you felt down, depressed, or hopeless? Over the past 2 weeks have you had thoughts of killing yourself? Have you ever attempted to kill yourself? When did this last happen? User   05/28/23 3232 no no yes more than 6 months ago AAP             Results for orders placed or performed during the hospital encounter of 05/28/23   CT Lumbar Spine w/o Contrast     Status: None    Narrative    EXAM: CT THORACIC SPINE W/O CONTRAST, CT LUMBAR SPINE W/O CONTRAST  LOCATION: [facility]  DATE/TIME: 5/28/2023 9:38 PM CDT    INDICATION: Acute pain intermittent numbness  COMPARISON: None.  TECHNIQUE: "   1) Routine CT Thoracic Spine without IV contrast. Multiplanar reformats. Dose reduction techniques were used.   2) Routine CT Lumbar Spine without IV contrast. Multiplanar reformats. Dose reduction techniques were used.     FINDINGS:     VERTEBRA: Normal vertebral body heights. Scoliosis. No acute compression fracture or posttraumatic subluxation.     CANAL/FORAMINA: No significant canal or neural foraminal stenosis.    PARASPINAL: No acute extraspinal abnormality.       Impression    IMPRESSION:  1.  Scoliosis. Otherwise, unremarkable thoracolumbar spine CT.   CT Thoracic Spine w/o Contrast     Status: None    Narrative    EXAM: CT THORACIC SPINE W/O CONTRAST, CT LUMBAR SPINE W/O CONTRAST  LOCATION: [facility]  DATE/TIME: 5/28/2023 9:38 PM CDT    INDICATION: Acute pain intermittent numbness  COMPARISON: None.  TECHNIQUE:   1) Routine CT Thoracic Spine without IV contrast. Multiplanar reformats. Dose reduction techniques were used.   2) Routine CT Lumbar Spine without IV contrast. Multiplanar reformats. Dose reduction techniques were used.     FINDINGS:     VERTEBRA: Normal vertebral body heights. Scoliosis. No acute compression fracture or posttraumatic subluxation.     CANAL/FORAMINA: No significant canal or neural foraminal stenosis.    PARASPINAL: No acute extraspinal abnormality.       Impression    IMPRESSION:  1.  Scoliosis. Otherwise, unremarkable thoracolumbar spine CT.   Comprehensive metabolic panel     Status: Abnormal   Result Value Ref Range    Sodium 141 136 - 145 mmol/L    Potassium 3.8 3.4 - 5.3 mmol/L    Chloride 103 98 - 107 mmol/L    Carbon Dioxide (CO2) 27 22 - 29 mmol/L    Anion Gap 11 7 - 15 mmol/L    Urea Nitrogen 12.4 6.0 - 20.0 mg/dL    Creatinine 0.69 0.51 - 0.95 mg/dL    Calcium 9.7 8.6 - 10.0 mg/dL    Glucose 133 (H) 70 - 99 mg/dL    Alkaline Phosphatase 95 35 - 104 U/L    AST 20 10 - 35 U/L    ALT 34 10 - 35 U/L    Protein Total 6.9 6.4 -  8.3 g/dL    Albumin 4.2 3.5 - 5.2 g/dL    Bilirubin Total <0.2 <=1.2 mg/dL    GFR Estimate >90 >60 mL/min/1.73m2   Erythrocyte sedimentation rate auto     Status: Normal   Result Value Ref Range    Erythrocyte Sedimentation Rate 20 0 - 20 mm/hr   CRP inflammation     Status: Abnormal   Result Value Ref Range    CRP Inflammation 11.83 (H) <5.00 mg/L   CBC with platelets and differential     Status: Abnormal   Result Value Ref Range    WBC Count 12.8 (H) 4.0 - 11.0 10e3/uL    RBC Count 4.40 3.80 - 5.20 10e6/uL    Hemoglobin 13.0 11.7 - 15.7 g/dL    Hematocrit 39.4 35.0 - 47.0 %    MCV 90 78 - 100 fL    MCH 29.5 26.5 - 33.0 pg    MCHC 33.0 31.5 - 36.5 g/dL    RDW 13.2 10.0 - 15.0 %    Platelet Count 311 150 - 450 10e3/uL    % Neutrophils 80 %    % Lymphocytes 13 %    % Monocytes 6 %    % Eosinophils 0 %    % Basophils 0 %    % Immature Granulocytes 1 %    NRBCs per 100 WBC 0 <1 /100    Absolute Neutrophils 10.2 (H) 1.6 - 8.3 10e3/uL    Absolute Lymphocytes 1.7 0.8 - 5.3 10e3/uL    Absolute Monocytes 0.7 0.0 - 1.3 10e3/uL    Absolute Eosinophils 0.0 0.0 - 0.7 10e3/uL    Absolute Basophils 0.0 0.0 - 0.2 10e3/uL    Absolute Immature Granulocytes 0.1 <=0.4 10e3/uL    Absolute NRBCs 0.0 10e3/uL   CBC with platelets differential     Status: Abnormal    Narrative    The following orders were created for panel order CBC with platelets differential.  Procedure                               Abnormality         Status                     ---------                               -----------         ------                     CBC with platelets and d...[[id number]]  Abnormal            Final result                 Please view results for these tests on the individual orders.           Medications   ketorolac (TORADOL) injection 30 mg (30 mg Intramuscular $Given 5/28/23 7332)     Labs Ordered and Resulted from Time of ED Arrival to Time of ED Departure   COMPREHENSIVE METABOLIC PANEL - Abnormal       Result Value    Sodium 141       Potassium 3.8      Chloride 103      Carbon Dioxide (CO2) 27      Anion Gap 11      Urea Nitrogen 12.4      Creatinine 0.69      Calcium 9.7      Glucose 133 (*)     Alkaline Phosphatase 95      AST 20      ALT 34      Protein Total 6.9      Albumin 4.2      Bilirubin Total <0.2      GFR Estimate >90     CRP INFLAMMATION - Abnormal    CRP Inflammation 11.83 (*)    CBC WITH PLATELETS AND DIFFERENTIAL - Abnormal    WBC Count 12.8 (*)     RBC Count 4.40      Hemoglobin 13.0      Hematocrit 39.4      MCV 90      MCH 29.5      MCHC 33.0      RDW 13.2      Platelet Count 311      % Neutrophils 80      % Lymphocytes 13      % Monocytes 6      % Eosinophils 0      % Basophils 0      % Immature Granulocytes 1      NRBCs per 100 WBC 0      Absolute Neutrophils 10.2 (*)     Absolute Lymphocytes 1.7      Absolute Monocytes 0.7      Absolute Eosinophils 0.0      Absolute Basophils 0.0      Absolute Immature Granulocytes 0.1      Absolute NRBCs 0.0     ERYTHROCYTE SEDIMENTATION RATE AUTO - Normal    Erythrocyte Sedimentation Rate 20       CT Lumbar Spine w/o Contrast   Final Result   IMPRESSION:   1.  Scoliosis. Otherwise, unremarkable thoracolumbar spine CT.      CT Thoracic Spine w/o Contrast   Final Result   IMPRESSION:   1.  Scoliosis. Otherwise, unremarkable thoracolumbar spine CT.             Critical care was not performed.     Medical Decision Making  The patient's presentation was of moderate complexity (an acute illness with systemic symptoms).    The patient's evaluation involved:  ordering and/or review of 3+ test(s) in this encounter (see separate area of note for details)    The patient's management necessitated moderate risk (prescription drug management including medications given in the ED).      Assessment & Plan        I have reviewed the nursing notes. I have reviewed the findings, diagnosis, plan and need for follow up with the patient.    Discharge Medication List as of 5/28/2023 11:19 PM      START taking  these medications    Details   ketorolac (TORADOL) 10 MG tablet Take 1 tablet (10 mg) by mouth every 6 hours as needed for moderate pain, Disp-20 tablet, R-0, E-Prescribe             Patient being seen with acute back pain different than previous examples and has no symptoms of cauda equina with normal bowel movements normal bowel control and normal bladder control.  No paresthesias in the perirectal area patient does not appear to have acute cauda equina she is having some exacerbation of chronic low back pain and has been responding well to Toradol here in the emergency room she will be prescribed Toradol on an outpatient basis she is scheduled to be seen in follow-up this week and will continue to closely monitor her back pain as well as being followed up by neurology.  Patient understands the importance of returning to the emergency room if she has any increased weakness change in bowel or bladder control or paresthesias.      Final diagnoses:   Acute bilateral low back pain without sciatica       Sandoval Demarco  [facility] EMERGENCY DEPARTMENT  5/28/2023     Sandoval Demarco MD  06/03/23 2035

## 2023-05-29 NOTE — PROGRESS NOTES
[facility]    Background: Care Coordination referral placed from [facility] discharge report for reason of patient meeting criteria for a TCM outreach call by [facility] team.    Assessment: Upon chart review, [facility] Team member will cancel/close the referral for TCM outreach due to reason below:    Patient has discharged to a Group home, Memory Care or Nursing Home    Plan: Care Coordination referral for TCM outreach canceled.      Zulema Sanchez MA  [facility] [place], [facility]    *Connected Care Resource Team does NOT follow patient ongoing. Referrals are identified based on internal discharge reports and the outreach is to ensure patient has an understanding of their discharge instructions.  
clear

## 2023-05-29 NOTE — DISCHARGE INSTRUCTIONS
Discharge to home May use Toradol for pain management follow-up with neurology clinic as well as your primary MD or return if development of any weakness change in bowel or bladder function or increased paresthesia.

## 2023-06-01 ENCOUNTER — HEALTH MAINTENANCE LETTER (OUTPATIENT)
Age: 33
End: 2023-06-01

## 2023-06-06 NOTE — TELEPHONE ENCOUNTER
RECORDS RECEIVED FROM: Internal   REASON FOR VISIT: Acute bilateral low back pain without sciatica    Date of Appt: 7/7/23 1:15pm    NOTES (FOR ALL VISITS) STATUS DETAILS   OFFICE NOTE from referring provider Internal ED Referral   OFFICE NOTE from other specialist Internal 5/11/23 Latonya Knight MD  @ [facility]    DISCHARGE REPORT from the ER Internal 5/28/23 Sandoval Demarco MD @ [facility] ED    5/23/23 Niki Pryor MD @ [facility] ED    5/22/23 Margie Street PA @ [facility] Patricia    1/11/23 Bird Valles MD @ [facility] ED    1/5/23 Brice Goncalves DO @ [facility]     MEDICATION LIST Internal    IMAGING  (FOR ALL VISITS)     X-RAY Internal [facility]  11/8/22  XR Lumbar Spine  11/8/22  XR Thoracic Spine     ULTRASOUND (CAROTID BILAT) *VASCULAR* Internal [facility]  5/23/23 US Lower Extremity Venous Duplex Right     MRI (HEAD, NECK, SPINE) Internal [facility]  5/23/23  MR Lumbar Spine     CT (HEAD, NECK, SPINE) Internal [facility]  5/28/23  CT Lumbar Spine  5/28/23  CT Thoracic Spine  7/5/22  CT Thoracic Spine

## 2023-06-10 ENCOUNTER — APPOINTMENT (OUTPATIENT)
Dept: GENERAL RADIOLOGY | Facility: CLINIC | Age: 33
End: 2023-06-10
Attending: EMERGENCY MEDICINE
Payer: COMMERCIAL

## 2023-06-10 ENCOUNTER — HOSPITAL ENCOUNTER (EMERGENCY)
Facility: CLINIC | Age: 33
Discharge: NURSING, REHAB OR LONG-TERM CARE | End: 2023-06-10
Attending: EMERGENCY MEDICINE | Admitting: EMERGENCY MEDICINE
Payer: COMMERCIAL

## 2023-06-10 VITALS
RESPIRATION RATE: 18 BRPM | SYSTOLIC BLOOD PRESSURE: 126 MMHG | DIASTOLIC BLOOD PRESSURE: 65 MMHG | TEMPERATURE: 98.4 F | OXYGEN SATURATION: 97 % | HEART RATE: 93 BPM

## 2023-06-10 DIAGNOSIS — S89.91XA INJURY OF RIGHT KNEE, INITIAL ENCOUNTER: ICD-10-CM

## 2023-06-10 PROCEDURE — 250N000013 HC RX MED GY IP 250 OP 250 PS 637: Performed by: EMERGENCY MEDICINE

## 2023-06-10 PROCEDURE — 73562 X-RAY EXAM OF KNEE 3: CPT | Mod: RT

## 2023-06-10 PROCEDURE — 99284 EMERGENCY DEPT VISIT MOD MDM: CPT | Performed by: EMERGENCY MEDICINE

## 2023-06-10 PROCEDURE — 99283 EMERGENCY DEPT VISIT LOW MDM: CPT | Performed by: EMERGENCY MEDICINE

## 2023-06-10 RX ORDER — OXYCODONE HYDROCHLORIDE 5 MG/1
5 TABLET ORAL ONCE
Status: DISCONTINUED | OUTPATIENT
Start: 2023-06-10 | End: 2023-06-10

## 2023-06-10 RX ORDER — TRAMADOL HYDROCHLORIDE 50 MG/1
50 TABLET ORAL ONCE
Status: COMPLETED | OUTPATIENT
Start: 2023-06-10 | End: 2023-06-10

## 2023-06-10 RX ORDER — HYDROMORPHONE HYDROCHLORIDE 2 MG/1
2 TABLET ORAL ONCE
Status: COMPLETED | OUTPATIENT
Start: 2023-06-10 | End: 2023-06-10

## 2023-06-10 RX ADMIN — TRAMADOL HYDROCHLORIDE 50 MG: 50 TABLET, COATED ORAL at 19:30

## 2023-06-10 RX ADMIN — HYDROMORPHONE HYDROCHLORIDE 2 MG: 2 TABLET ORAL at 20:39

## 2023-06-10 ASSESSMENT — ACTIVITIES OF DAILY LIVING (ADL)
ADLS_ACUITY_SCORE: 42
ADLS_ACUITY_SCORE: 40

## 2023-06-10 NOTE — ED TRIAGE NOTES
Paramedics states that Nevin fell today over her walker that she uses to assist with her walking. After she c/o right knee pain, that was greater than normal, and states to now have numbness and reduced sensation. She has taken 650 mg tylenol and 600mg ibuprofen. Her guardian is wishing that she would at least not get narcotics for transport. She has a hx of a right acl tear, and she was able to walk down stairs with assistance. She has a hx of depression and anxiety. Vitals stable en route.

## 2023-06-11 NOTE — ED NOTES
Pt has recent hx of torn R ACL and had MRI done last week.  Tells this writer that she is supposed to have surgical repair.

## 2023-06-11 NOTE — DISCHARGE INSTRUCTIONS
Please make an appointment to follow up with your orthopedic surgeon as soon as possible.      Use walker for support at home.  Use acetaminophen 1000 mg every 6 hours or ibuprofen 600 mg every 6 hours as needed for pain.  Ice and elevate the knee as much as possible to decrease swelling.      If you have worsening symptoms such as increased pain, weakness, discoloration of your foot or other concerns return to the emergency department for reevaluation.

## 2023-06-11 NOTE — ED NOTES
Patient was picked up by  staff.  staff brought pt's walker but patient  Preferred to use  wheelchair. Psych Associate wheeled the patient to ED waiting area.

## 2023-06-11 NOTE — ED PROVIDER NOTES
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 for all type and screen orders.     Clavulanic Acid Angioedema     Fentanyl Itching     Haemophilus B Polysaccharide Vaccine Nausea and Vomiting     Naltrexone Other (See Comments)     Reaction(s): Vivid dreams.     Other Drug Allergy (See Comments)      See original file MRN [id number]. Files are  marked for merge     Oxycodone Swelling     Adhesive Tape Rash     Silicone type  Silicone type    Other reaction(s): adhesive allergy  Other reaction(s): adhesive allergy  Silicone type    Other reaction(s): adhesive allergy       Band-Aid Anti-Itch      Other reaction(s): adhesive allergy     Cephalosporins Rash     Lamotrigine Rash     Possibly associated with Lamictal.   HUT Comment: Possibly associated with Lamictal. ; HUT Reaction: Rash; HUT Reaction Type: Allergy; HUT Severity: Low; HUT Noted: [id number]     No Clinical Screening - See Comments Rash and Other (See Comments)     Silicone type  Silicone type  See original file MRN [id number]. Files are marked for merge  History of swallowing sharp metallic objects. She should not be prescribed lancets due to posed risk of swallowing.      Family History  Family History   Problem Relation Age of Onset     Diabetes Type 2  Maternal Grandmother      Diabetes Type 2  Paternal Grandmother      Breast Cancer Paternal Grandmother      Cerebrovascular Disease Father 53     Myocardial Infarction No family hx of      Coronary Artery Disease Early Onset No family hx of      Ovarian Cancer No family hx of      Colon Cancer No family hx of      Depression Mother      Anxiety Disorder Mother      Social History   Social History     Tobacco Use     Smoking status: Never     Smokeless tobacco: Never   Substance Use Topics     Alcohol use: No     Alcohol/week: 0.0 standard drinks of alcohol     Drug use: No      Past medical history, past surgical history, medications, allergies, family history, and social history were reviewed with the patient. No additional pertinent items.      A complete review of systems was performed with pertinent positives and negatives noted in the HPI, and all other systems negative.    Physical Exam   BP: 137/87  Pulse: 84  Temp: 98.4  F (36.9  C)  Resp: 18  SpO2: 97 %  Physical Exam  General: patient is alert and oriented and in no acute distress    Head: atraumatic and normocephalic   EENT: moist mucus membranes   Neck: supple   Cardiovascular: regular rate and rhythm, extremities warm and well perfused, no lower extremity edema, 2+ DP pulses, normal capillary refill in the toes of the right foot  Musculoskeletal: TTP of the right patella, no TTP of the RLL, decreased range of motion of the right leg as she reports she can not move her right knee, ankle or toes  Neurological: alert and oriented, moving all extremities symmetrically, reports no sensation throughout the anterior, posterior, medial and lateral RLL and plantar and dorsal surface of the right foot but does withdraw to pain  Skin: warm, dry     ED Course, Procedures, & Data      Procedures                   No results found for any visits on 06/10/23.  Medications - No data to display  Labs Ordered and Resulted from Time of ED Arrival to Time of ED Departure - No data to display  No orders to display          Critical care was not performed.     Medical Decision Making  The patient's presentation was of moderate complexity (an acute complicated injury).    The patient's evaluation involved:  ordering and/or review of 1 test(s) in this encounter (XR)  discussion of management or test interpretation with another health professional (Orthopedics)    The patient's management necessitated moderate risk (prescription drug management including medications given in the ED).      Assessment & Plan    Ms. Alvarado is a 32 year old female with a past medical history of endometriosis, PE, PCOS, HTN, DM 2 and PTSD who presents to the emergency department, [facility], for evaluation of knee injury.  She is hemodynamically stable, afebrile and in no respiratory distress.  Of note she does have a care plan with recommendations for one-to-one sitter and did have a sitter present in her room throughout her stay.  Her fall was mechanical in nature.  She did have an x-ray of the right knee which shows no evidence of  fracture.  On exam she has strong distal pulses and given her mechanism of injury have low suspicion for vascular injury.  She reports that she has numbness from her knee down and is unable to move her knee ankle or toes however she does withdraw to pain and was able to ambulate with her walker bending her ankle and knee while walking.  I did discuss with orthopedic surgery and exam is not consistent with a neurologic injury from her knee.  She does have a walker and group home staff is here with her to assist her home.  I have recommended that she call her orthopedist on Monday to schedule close follow-up to further review her MRI results as well as her recent fall.  She was given close return precautions for the emergency department and voiced understanding.    I have reviewed the nursing notes. I have reviewed the findings, diagnosis, plan and need for follow up with the patient.    Discharge Medication List as of 6/10/2023  9:19 PM          Final diagnoses:   Injury of right knee, initial encounter     IMena, am serving as a trained medical scribe to document services personally performed by Rhina Martinez MD, based on the provider's statements to me.     I, Rhina Martinez MD, was physically present and have reviewed and verified the accuracy of this note documented by Mena Thompson.    Rhina Martinez MD.  [facility] EMERGENCY DEPARTMENT  6/10/2023     Rhina Martinez MD  06/10/23 [id number]

## 2023-06-23 ENCOUNTER — OFFICE VISIT (OUTPATIENT)
Dept: OTOLARYNGOLOGY | Facility: CLINIC | Age: 33
End: 2023-06-23
Payer: COMMERCIAL

## 2023-06-23 VITALS
HEART RATE: 89 BPM | HEIGHT: 62 IN | DIASTOLIC BLOOD PRESSURE: 86 MMHG | BODY MASS INDEX: 53.92 KG/M2 | WEIGHT: 293 LBS | SYSTOLIC BLOOD PRESSURE: 116 MMHG

## 2023-06-23 DIAGNOSIS — R49.0 DYSPHONIA: Primary | ICD-10-CM

## 2023-06-23 DIAGNOSIS — J38.01 VOCAL FOLD PARALYSIS, LEFT: Primary | ICD-10-CM

## 2023-06-23 PROCEDURE — 99207 PR NO CHARGE LOS: CPT

## 2023-06-23 PROCEDURE — 99214 OFFICE O/P EST MOD 30 MIN: CPT | Mod: 25 | Performed by: OTOLARYNGOLOGY

## 2023-06-23 PROCEDURE — 31575 DIAGNOSTIC LARYNGOSCOPY: CPT | Performed by: OTOLARYNGOLOGY

## 2023-06-23 ASSESSMENT — PAIN SCALES - GENERAL: PAINLEVEL: NO PAIN (0)

## 2023-06-23 NOTE — PROGRESS NOTES
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 tablet, Take 1 tablet (10 mg) by mouth daily (Patient not taking: Reported on 6/23/2023), Disp: 10 tablet, Rfl: 0     OLANZapine (ZYPREXA) 2.5 MG tablet, Take 1.25 mg by mouth daily (with dinner) At 5pm (Patient not taking: Reported on 6/23/2023), Disp: , Rfl:     ALLERGIES: Amoxicillin-pot  clavulanate, Hydrocodone, Hydrocodone-acetaminophen, Influenza vaccines, Latex, Oseltamivir, Penicillins, Vancomycin, Blood-group specific substance, Clavulanic acid, Fentanyl, Haemophilus b polysaccharide vaccine, Naltrexone, Other drug allergy (see comments), Oxycodone, Adhesive tape, Band-aid anti-itch, Cephalosporins, Lamotrigine, and No clinical screening - see comments    SOCIAL HISTORY:    Social History     Socioeconomic History     Marital status: Single     Spouse name: Not on file     Number of children: Not on file     Years of education: Not on file     Highest education level: Not on file   Occupational History     Occupation: On disability   Tobacco Use     Smoking status: Never     Smokeless tobacco: Never   Vaping Use     Vaping Use: Not on file   Substance and Sexual Activity     Alcohol use: No     Alcohol/week: 0.0 standard drinks of alcohol     Drug use: No     Sexual activity: Not Currently     Partners: Male     Birth control/protection: I.U.D.     Comment: IUD - Mirena - placed July, 2015   Other Topics Concern     Parent/sibling w/ CABG, MI or angioplasty before 65F 55M? Not Asked   Social History Narrative    Single.    Living in independent living portion of [employer].    On disability.    No regular exercise.      Social Determinants of Health     Financial Resource Strain: Not on file   Food Insecurity: Not on file   Transportation Needs: Not on file   Physical Activity: Not on file   Stress: Not on file   Social Connections: Not on file   Intimate Partner Violence: Not on file   Housing Stability: Not on file         FAMILY HISTORY:   Family History   Problem Relation Age of Onset     Diabetes Type 2  Maternal Grandmother      Diabetes Type 2  Paternal Grandmother      Breast Cancer Paternal Grandmother      Cerebrovascular Disease Father 53     Myocardial Infarction No family hx of      Coronary Artery Disease Early Onset No family hx of      Ovarian Cancer No family hx of       Colon Cancer No family hx of      Depression Mother      Anxiety Disorder Mother       Non-contributory for problems with anesthesia    REVIEW OF SYSTEMS:   The patient was asked a 14 point review of systems regarding constitutional symptoms, eye symptoms, ears, nose, mouth, throat symptoms, cardiovascular symptoms, respiratory symptoms, gastrointestinal symptoms, genitourinary symptoms, musculoskeletal symptoms, integumentary symptoms, neurological symptoms, psychiatric symptoms, endocrine symptoms, hematologic/lymphatic symptoms, and allergic/ immunologic symptoms.   The pertinent factors have been included in the HPI and below.  Patient Supplied Answers to Review of Systems      5/12/2023    11:10 AM    ENT ROS   Constitutional Problems with sleep   Ears, Nose, Throat Nasal congestion or drainage    Hoarseness   Allergy/Immunology Allergies or hay fever   Hematologic Bleeding problems       Physical Examination   The patient underwent a physical examination as described below. The pertinent positive and negative findings are summarized after the description of the examination.  Constitutional: The patient's developmental and nutritional status was assessed. The patient's voice quality was assessed.  Head and Face: The head and face were inspected for deformities. The sinuses were palpated. The salivary glands were palpated. Facial muscle strength was assessed bilaterally.  Eyes: Extraocular movements and primary gaze alignment were assessed.  Ears, Nose, Mouth and Throat: The ears and nose were examined for deformities. The nasal septum, mucosa, and turbinates were inspected by anterior rhinoscopy. The lips, teeth, and gums were examined for abnormalities. The oral mucosa, tongue, palate, tonsils, lateral and posterior pharynx were inspected for the presence of asymmetry or mucosal lesions.    Neck: The tracheal position was noted, and the neck mass palpated to determine if there were any asymmetries,  abnormal neck masses, thyromegally, or thyroid nodules.  Respiratory: The nature of the breathing and chest expansion/symmetry was observed.  Cardiovascular: The patient was examined to determine the presence of any edema or jugular venous distension.  Abdomen: The contour of the abdomen was noted.  Lymphatic: The patient was examined for infraclavicular lymphadenopathy.  Musculoskeletal: The patient was inspected for the presence of skeletal deformities.  Extremities: The extremities were examined for any clubbing or cyanosis.  Skin: The skin was examined for inflammatory or neoplastic conditions.  Neurologic: The patient's orientation, mood, and affect were noted. The cranial nerve  functions were examined.  Other pertinent positive and negative findings on physical examination:   OC/OP: no lesions, uvula midline, soft palate elevates symmetrically   Neck: no lesions, no TH tenderness to palpation    All other physical examination findings were within normal limits and noncontributory.    Procedures   Flexible laryngoscopy (CPT 82133)      Pre-procedure diagnosis: dysphonia  Post-procedure diagnosis: same as above  Indication for procedure: Ms. Alvarado is a 32 year old female with see above  Procedure(s): Fiberoptic Laryngoscopy    Details of Procedure: After informed consent was obtained, the patient was seated in the examination chair.  The areas of the nasopharynx as well as the hypopharynx were anesthetized with topical 4% lidocaine with 0.25% phenylephrine atomizer.  Examination of the base of tongue was performed first.  Attention was directed to any evidence of masses in the area or evidence of leukoplakia or candidal infection.  Attention was directed to the epiglottis where its size and position was determined and its movement on phonation of the vowel  e .  The piriform sinuses were then inspected for any mass lesions or pooling of secretions.  Attention was then directed to the larynx. The vocal  folds were inspected for infection or any areas of leukoplakia, for masses, polypoid degeneration, or hemorrhage.  Having done this, the arytenoids and vocal processes were inspected for erythema or evidence of granuloma formation.  The posterior commissure was then inspected for evidence of inflammatory changes in the mucosa and heaping up of mucosal tissue. The patient was then instructed to say the vowel  e .  Adduction of vocal folds to the midline was observed for any evidence of paresis or paralysis of the larynx or asymmetry in rotation of the larynx to the left or right. The patient was asked to breathe and the degree of abduction was noted bilaterally.  Subglottic view of the larynx was obtained for any additional mass lesions or mucosal changes.  Finally the post cricoid was examined for evidence of pooling of secretions, as well as the pharyngeal wall mucosa.   Anesthesia type: 0.25% phenylephrine    Findings:  Anatomic/physiological deviations: RNC, stable left vocal fold paralysis, arytenoid hooding with deep inspiration, septal perforation    Right vocal process: No restriction of mobility   Left vocal process: Marked restriction of mobility  Glottal gap: Complete glottal closure  Supraglottic structures: Normal  Hypopharynx: Normal     Estimated Blood Loss: minimal  Complications: None  Disposition: Patient tolerated the procedure well                  Review of Relevant Clinical Data   I personally reviewed:  Notes:    Radiology:    Pathology:    Procedures:    Labs:  Lab Results   Component Value Date    TSH 4.94 (H) 02/11/2022     Lab Results   Component Value Date     05/28/2023    CO2 27 05/28/2023    BUN 12.4 05/28/2023    CREAT 0.6 08/31/2020    PHOS 3.5 12/01/2019     Lab Results   Component Value Date    WBC 12.8 (H) 05/28/2023    HGB 13.0 05/28/2023    HCT 39.4 05/28/2023    MCV 90 05/28/2023     05/28/2023     Lab Results   Component Value Date    PTT 24 01/15/2023    INR 1.11  "01/15/2023     No results found for: JULIA  No components found for: RHEUMATOIDFACTOR,  RF  Lab Results   Component Value Date    CRP 1.3 (H) 02/18/2023    CRP 26.0 (H) 10/31/2020    CRP 27.0 (H) 10/30/2020    CRP 23.0 (H) 10/11/2020    CRP 22.0 (H) 09/05/2020     No components found for: CKTOT, URICACID  No components found for: C3, C4, DSDNAAB, NDNAABIFA  No results found for: MPOAB    Patient reported Quality of Life (QOL) Measures   Patient Supplied Answers To VHI Questionnaire      10/24/2022    11:04 AM   Voice Handicap Index (VHI-10)   My voice makes it difficult for people to hear me 3   People have difficulty understanding me in a noisy room 3   My voice difficulties restrict my personal and social life.  2   I feel left out of conversations because of my voice 2   My voice problem causes me to lose income 0   I feel as though I have to strain to produce voice 3   The clarity of my voice is unpredictable 3   My voice problem upsets me 2   My voice makes me feel handicapped 2   People ask, \"What's wrong with your voice?\" 3   VHI-10 23         Patient Supplied Answers To EAT Questionnaire      10/24/2022    11:05 AM   Eating Assessment Tool (EAT-10)   My swallowing problem has caused me to lose weight 0   My swallowing problem interferes with my ability to go out for meals 2   Swallowing liquids takes extra effort 0   Swallowing solids takes extra effort 2   Swallowing pills takes extra effort 2   Swallowing is painful 2   The pleasure of eating is affected by my swallowing 2   When I swallow food sticks in my throat 3   I cough when I eat 2   Swallowing is stressful 3   EAT-10 18         Patient Supplied Answers To CSI Questionnaire      10/24/2022    11:06 AM   Cough Severity Index (CSI)   My cough is worse when I lie down 2   My coughing problem causes me to restrict my personal and social life 2   I tend to avoid places because of my cough problem 2   I feel embarrassed because of my coughing problem 2 "   People ask, ''What's wrong?'' because I cough a lot 2   I run out of air when I cough 3   My coughing problem affects my voice 3   My coughing problem limits my physical activity 2   My coughing problem upsets me 2   People ask me if I am sick because I cough a lot 2   CSI Score 22         Patient Supplied Answers to Dyspnea Index Questionnaire:       No data to display                Impression & Plan     IMPRESSION: Ms. Alvarado is a 32 year old female who is being seen for the followin. Dysphonia   - since May  - after URI  - voice was gone for 3 weeks  - now better but not normal  - both singing and speaking voice  - seen in the hospital on  with vocal fold paralysis on the left  - has complex history of multiple EGDs for foreign body ingestion, had esophageal perf in  which required endoscopic procedures, no open procedures  - has coughing and vomiting as well  - had CT neck 2022 - no obvious lesions  - scope shows left vocal fold paralysis, postcricoid edema, right>left infraglottic prominence/granuloma   - discussed will review CT neck with neurorads - if clear then this is idiopathic vocal fold paralysis vs post-surgical after her perforation in , she can't remember if voice changes happened at that time  - discussed if this is idiopathic after her cold in May - it takes 1 year to allow for full recovery  - voice therapy for optimization both for vocal fold paralysis, as well as for irritable larynx syndrome and for vocal process granuloma/prominence   - did voice therapy in [place] without benefit   - symptoms 3/31/23 voice is hoarse in the morning or after not talking for a while, improves throughout the day and no pain with talking or singing, voice cuts out with singing but has improved    - did do therapy with Anjali, did not find benefit  - scope shows bilateral mild restriction in mucosal wave, left vocal fold paralysis, arytenoid hooding with deep inspiration, septal  perforation with copious crusting    - discussed voice therapy, if voice worsens or becomes painful could consider procedural intervention but not significantly bothersome now   - symptoms 6/23/2023 are stable voice does not bother her  - scope shows persistent left vocal fold paralysis and postcricoid edema  - Discussed this is likely her permanent issue  - Likely has had this since her esophageal perforation in 2019  - Discussed future precautions for this  - We will provide letter for her  Plan  -Return if new voice problems start  - Write a letter to state   Patient has left vocal fold paralysis.  If any surgery is needed in the neck would recommend repeat evaluation before and after the surgery and extreme care to the recurrent laryngeal nerve on the right to prevent bilateral vocal fold paralysis.    Also recommend if the patient needs any further intubation - to use a small endotracheal tube size 6 and below to minimize the risk of vocal fold paralysis on the right and resulting bilateral vocal fold paralysis and dyspnea postextubation      2. Dysphagia  - in the setting of complex history of multiple EGDs for foreign body ingestion, had esophageal perf in 2019 which required endoscopic procedures, no open procedures  - feels foods like bread and meat get stuck  - no recent swallow evaluations  - just had a foreign body requiring EGD last night  - coughing up blood at times - likely esophageal   - does have nasal crusting today - so discussed vaseline for that   - discussed having GI follow up and swallow evaluation  - reviewed Xray Video Swallow Exam 11/4/22 at swallow rounds - safe swallow   - symptoms 3/31/23 has some discomfort with swallowing but no sharp pain with swallowing saliva   - symptoms 6/23/2023 are stable  Plan  - continue follow-up with Dr. Ulloa        3. Septal perforation  - unsure how this started   - very painful with no associated intranasal substance use   - scope shows bilateral mild  restriction in mucosal wave, left vocal fold paralysis, arytenoid hooding with deep inspiration, septal perforation with copious crusting    - discussed Neti pot and Vaseline/aquaphor to clear crusting  - discussed consulting with rhinology team  - discussed breathing therapy, will start with medical management for hydration of perforation  -Improved crusting today  Plan   -Continue work with Dr. Apple         RETURN VISIT: as needed     Chrissy Simons MD    Laryngology    [facility]  Department of  Otolaryngology - Head and Neck Surgery  [facility] [place] [address]  Appointment line: [phone]  Fax: [phone]  [email or web address]

## 2023-06-23 NOTE — LETTER
June 23, 2023    RE:  Nevin Alvarado                              [id number] [facility] [place] [address]      To Whom It May Concern:    This letter is written to document that Nevin Alvarado is under the medical care of Dr. Chrissy Simons of the [facility] at the [facility] outpatient [facility].    Nevin has left vocal fold paralysis.  If any surgery is needed in the neck , it is recommended patient have repeat evaluation before and after the surgery and extreme care to the recurrent laryngeal nerve on the right to prevent bilateral vocal fold paralysis.    Also recommend if the patient needs any further intubation, use a small endotracheal tube(size 6 or smaller) to minimize the risk of vocal fold paralysis on the right and resulting bilateral vocal fold paralysis and dyspnea postextubation.    If there are questions or concerns regarding the plan of care, please contact the [facility] at [phone].    Sincerely,      Chrissy Simons MD    Laryngology    [facility]  Department of  Otolaryngology - Head and Neck Surgery  [facility]  [address] [place] [address]  Appointment line: [phone]  Fax: [phone]

## 2023-06-23 NOTE — LETTER
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 Disp: 30 tablet, Rfl: 0    medroxyPROGESTERone (PROVERA) 10 MG tablet, Take 1 tablet (10 mg) by mouth daily (Patient not taking: Reported on 6/23/2023), Disp: 10  tablet, Rfl: 0    OLANZapine (ZYPREXA) 2.5 MG tablet, Take 1.25 mg by mouth daily (with dinner) At 5pm (Patient not taking: Reported on 6/23/2023), Disp: , Rfl:     ALLERGIES: Amoxicillin-pot clavulanate, Hydrocodone, Hydrocodone-acetaminophen, Influenza vaccines, Latex, Oseltamivir, Penicillins, Vancomycin, Blood-group specific substance, Clavulanic acid, Fentanyl, Haemophilus b polysaccharide vaccine, Naltrexone, Other drug allergy (see comments), Oxycodone, Adhesive tape, Band-aid anti-itch, Cephalosporins, Lamotrigine, and No clinical screening - see comments    SOCIAL HISTORY:    Social History     Socioeconomic History    Marital status: Single     Spouse name: Not on file    Number of children: Not on file    Years of education: Not on file    Highest education level: Not on file   Occupational History    Occupation: On disability   Tobacco Use    Smoking status: Never    Smokeless tobacco: Never   Vaping Use    Vaping Use: Not on file   Substance and Sexual Activity    Alcohol use: No     Alcohol/week: 0.0 standard drinks of alcohol    Drug use: No    Sexual activity: Not Currently     Partners: Male     Birth control/protection: I.U.D.     Comment: IUD - Mirena - placed July, 2015   Other Topics Concern    Parent/sibling w/ CABG, MI or angioplasty before 65F 55M? Not Asked   Social History Narrative    Single.    Living in independent living portion of [employer].    On disability.    No regular exercise.      Social Determinants of Health     Financial Resource Strain: Not on file   Food Insecurity: Not on file   Transportation Needs: Not on file   Physical Activity: Not on file   Stress: Not on file   Social Connections: Not on file   Intimate Partner Violence: Not on file   Housing Stability: Not on file         FAMILY HISTORY:   Family History   Problem Relation Age of Onset    Diabetes Type 2  Maternal Grandmother     Diabetes Type 2  Paternal Grandmother     Breast Cancer Paternal Grandmother      Cerebrovascular Disease Father 53    Myocardial Infarction No family hx of     Coronary Artery Disease Early Onset No family hx of     Ovarian Cancer No family hx of     Colon Cancer No family hx of     Depression Mother     Anxiety Disorder Mother       Non-contributory for problems with anesthesia    REVIEW OF SYSTEMS:   The patient was asked a 14 point review of systems regarding constitutional symptoms, eye symptoms, ears, nose, mouth, throat symptoms, cardiovascular symptoms, respiratory symptoms, gastrointestinal symptoms, genitourinary symptoms, musculoskeletal symptoms, integumentary symptoms, neurological symptoms, psychiatric symptoms, endocrine symptoms, hematologic/lymphatic symptoms, and allergic/ immunologic symptoms.   The pertinent factors have been included in the HPI and below.  Patient Supplied Answers to Review of Systems      5/12/2023    11:10 AM    ENT ROS   Constitutional Problems with sleep   Ears, Nose, Throat Nasal congestion or drainage    Hoarseness   Allergy/Immunology Allergies or hay fever   Hematologic Bleeding problems       Physical Examination   The patient underwent a physical examination as described below. The pertinent positive and negative findings are summarized after the description of the examination.  Constitutional: The patient's developmental and nutritional status was assessed. The patient's voice quality was assessed.  Head and Face: The head and face were inspected for deformities. The sinuses were palpated. The salivary glands were palpated. Facial muscle strength was assessed bilaterally.  Eyes: Extraocular movements and primary gaze alignment were assessed.  Ears, Nose, Mouth and Throat: The ears and nose were examined for deformities. The nasal septum, mucosa, and turbinates were inspected by anterior rhinoscopy. The lips, teeth, and gums were examined for abnormalities. The oral mucosa, tongue, palate, tonsils, lateral and posterior pharynx were inspected  for the presence of asymmetry or mucosal lesions.    Neck: The tracheal position was noted, and the neck mass palpated to determine if there were any asymmetries, abnormal neck masses, thyromegally, or thyroid nodules.  Respiratory: The nature of the breathing and chest expansion/symmetry was observed.  Cardiovascular: The patient was examined to determine the presence of any edema or jugular venous distension.  Abdomen: The contour of the abdomen was noted.  Lymphatic: The patient was examined for infraclavicular lymphadenopathy.  Musculoskeletal: The patient was inspected for the presence of skeletal deformities.  Extremities: The extremities were examined for any clubbing or cyanosis.  Skin: The skin was examined for inflammatory or neoplastic conditions.  Neurologic: The patient's orientation, mood, and affect were noted. The cranial nerve  functions were examined.  Other pertinent positive and negative findings on physical examination:   OC/OP: no lesions, uvula midline, soft palate elevates symmetrically   Neck: no lesions, no TH tenderness to palpation    All other physical examination findings were within normal limits and noncontributory.    Procedures   Flexible laryngoscopy (CPT 83627)      Pre-procedure diagnosis: dysphonia  Post-procedure diagnosis: same as above  Indication for procedure: Ms. Alvarado is a 32 year old female with see above  Procedure(s): Fiberoptic Laryngoscopy    Details of Procedure: After informed consent was obtained, the patient was seated in the examination chair.  The areas of the nasopharynx as well as the hypopharynx were anesthetized with topical 4% lidocaine with 0.25% phenylephrine atomizer.  Examination of the base of tongue was performed first.  Attention was directed to any evidence of masses in the area or evidence of leukoplakia or candidal infection.  Attention was directed to the epiglottis where its size and position was determined and its movement on phonation  of the vowel  e .  The piriform sinuses were then inspected for any mass lesions or pooling of secretions.  Attention was then directed to the larynx. The vocal folds were inspected for infection or any areas of leukoplakia, for masses, polypoid degeneration, or hemorrhage.  Having done this, the arytenoids and vocal processes were inspected for erythema or evidence of granuloma formation.  The posterior commissure was then inspected for evidence of inflammatory changes in the mucosa and heaping up of mucosal tissue. The patient was then instructed to say the vowel  e .  Adduction of vocal folds to the midline was observed for any evidence of paresis or paralysis of the larynx or asymmetry in rotation of the larynx to the left or right. The patient was asked to breathe and the degree of abduction was noted bilaterally.  Subglottic view of the larynx was obtained for any additional mass lesions or mucosal changes.  Finally the post cricoid was examined for evidence of pooling of secretions, as well as the pharyngeal wall mucosa.   Anesthesia type: 0.25% phenylephrine    Findings:  Anatomic/physiological deviations: RNC, stable left vocal fold paralysis, arytenoid hooding with deep inspiration, septal perforation    Right vocal process: No restriction of mobility   Left vocal process: Marked restriction of mobility  Glottal gap: Complete glottal closure  Supraglottic structures: Normal  Hypopharynx: Normal     Estimated Blood Loss: minimal  Complications: None  Disposition: Patient tolerated the procedure well                  Review of Relevant Clinical Data   I personally reviewed:  Notes:    Radiology:    Pathology:    Procedures:    Labs:  Lab Results   Component Value Date    TSH 4.94 (H) 02/11/2022     Lab Results   Component Value Date     05/28/2023    CO2 27 05/28/2023    BUN 12.4 05/28/2023    CREAT 0.6 08/31/2020    PHOS 3.5 12/01/2019     Lab Results   Component Value Date    WBC 12.8 (H) 05/28/2023  "   HGB 13.0 05/28/2023    HCT 39.4 05/28/2023    MCV 90 05/28/2023     05/28/2023     Lab Results   Component Value Date    PTT 24 01/15/2023    INR 1.11 01/15/2023     No results found for: JULIA  No components found for: RHEUMATOIDFACTOR,  RF  Lab Results   Component Value Date    CRP 1.3 (H) 02/18/2023    CRP 26.0 (H) 10/31/2020    CRP 27.0 (H) 10/30/2020    CRP 23.0 (H) 10/11/2020    CRP 22.0 (H) 09/05/2020     No components found for: CKTOT, URICACID  No components found for: C3, C4, DSDNAAB, NDNAABIFA  No results found for: MPOAB    Patient reported Quality of Life (QOL) Measures   Patient Supplied Answers To VHI Questionnaire      10/24/2022    11:04 AM   Voice Handicap Index (VHI-10)   My voice makes it difficult for people to hear me 3   People have difficulty understanding me in a noisy room 3   My voice difficulties restrict my personal and social life.  2   I feel left out of conversations because of my voice 2   My voice problem causes me to lose income 0   I feel as though I have to strain to produce voice 3   The clarity of my voice is unpredictable 3   My voice problem upsets me 2   My voice makes me feel handicapped 2   People ask, \"What's wrong with your voice?\" 3   VHI-10 23         Patient Supplied Answers To EAT Questionnaire      10/24/2022    11:05 AM   Eating Assessment Tool (EAT-10)   My swallowing problem has caused me to lose weight 0   My swallowing problem interferes with my ability to go out for meals 2   Swallowing liquids takes extra effort 0   Swallowing solids takes extra effort 2   Swallowing pills takes extra effort 2   Swallowing is painful 2   The pleasure of eating is affected by my swallowing 2   When I swallow food sticks in my throat 3   I cough when I eat 2   Swallowing is stressful 3   EAT-10 18         Patient Supplied Answers To CSI Questionnaire      10/24/2022    11:06 AM   Cough Severity Index (CSI)   My cough is worse when I lie down 2   My coughing problem " causes me to restrict my personal and social life 2   I tend to avoid places because of my cough problem 2   I feel embarrassed because of my coughing problem 2   People ask, ''What's wrong?'' because I cough a lot 2   I run out of air when I cough 3   My coughing problem affects my voice 3   My coughing problem limits my physical activity 2   My coughing problem upsets me 2   People ask me if I am sick because I cough a lot 2   CSI Score 22         Patient Supplied Answers to Dyspnea Index Questionnaire:       No data to display                Impression & Plan     IMPRESSION: Ms. Alvarado is a 32 year old female who is being seen for the followin. Dysphonia   - since May  - after URI  - voice was gone for 3 weeks  - now better but not normal  - both singing and speaking voice  - seen in the hospital on  with vocal fold paralysis on the left  - has complex history of multiple EGDs for foreign body ingestion, had esophageal perf in  which required endoscopic procedures, no open procedures  - has coughing and vomiting as well  - had CT neck 2022 - no obvious lesions  - scope shows left vocal fold paralysis, postcricoid edema, right>left infraglottic prominence/granuloma   - discussed will review CT neck with neurorads - if clear then this is idiopathic vocal fold paralysis vs post-surgical after her perforation in , she can't remember if voice changes happened at that time  - discussed if this is idiopathic after her cold in May - it takes 1 year to allow for full recovery  - voice therapy for optimization both for vocal fold paralysis, as well as for irritable larynx syndrome and for vocal process granuloma/prominence   - did voice therapy in [place] without benefit   - symptoms 3/31/23 voice is hoarse in the morning or after not talking for a while, improves throughout the day and no pain with talking or singing, voice cuts out with singing but has improved    - did do therapy with Anjali  did not find benefit  - scope shows bilateral mild restriction in mucosal wave, left vocal fold paralysis, arytenoid hooding with deep inspiration, septal perforation with copious crusting    - discussed voice therapy, if voice worsens or becomes painful could consider procedural intervention but not significantly bothersome now   - symptoms 6/23/2023 are stable voice does not bother her  - scope shows persistent left vocal fold paralysis and postcricoid edema  - Discussed this is likely her permanent issue  - Likely has had this since her esophageal perforation in 2019  - Discussed future precautions for this  - We will provide letter for her  Plan  -Return if new voice problems start  - Write a letter to state   Patient has left vocal fold paralysis.  If any surgery is needed in the neck would recommend repeat evaluation before and after the surgery and extreme care to the recurrent laryngeal nerve on the right to prevent bilateral vocal fold paralysis.    Also recommend if the patient needs any further intubation - to use a small endotracheal tube size 6 and below to minimize the risk of vocal fold paralysis on the right and resulting bilateral vocal fold paralysis and dyspnea postextubation      2. Dysphagia  - in the setting of complex history of multiple EGDs for foreign body ingestion, had esophageal perf in 2019 which required endoscopic procedures, no open procedures  - feels foods like bread and meat get stuck  - no recent swallow evaluations  - just had a foreign body requiring EGD last night  - coughing up blood at times - likely esophageal   - does have nasal crusting today - so discussed vaseline for that   - discussed having GI follow up and swallow evaluation  - reviewed Xray Video Swallow Exam 11/4/22 at swallow rounds - safe swallow   - symptoms 3/31/23 has some discomfort with swallowing but no sharp pain with swallowing saliva   - symptoms 6/23/2023 are stable  Plan  - continue follow-up with   Artemio        3. Septal perforation  - unsure how this started   - very painful with no associated intranasal substance use   - scope shows bilateral mild restriction in mucosal wave, left vocal fold paralysis, arytenoid hooding with deep inspiration, septal perforation with copious crusting    - discussed Neti pot and Vaseline/aquaphor to clear crusting  - discussed consulting with rhinology team  - discussed breathing therapy, will start with medical management for hydration of perforation  -Improved crusting today  Plan   -Continue work with Dr. Apple         RETURN VISIT: as needed     Chrissy Simons MD    Laryngology    [facility]  Department of  Otolaryngology - Head and Neck Surgery  Clinics & Surgery Center  [address] [place] [address]  Appointment line: [phone]  Fax: [phone]  [email or web address]

## 2023-06-23 NOTE — PATIENT INSTRUCTIONS
1.  You were seen in the ENT Clinic today by . If you have any questions or concerns after your appointment, please call [phone]. Press option #1 for scheduling related needs. Press option #3 for Nurse advice.    2.  Plan is to return to clinic as needed      Hannah Rivera LPN  [phone]  [facility] Otolaryngology

## 2023-06-23 NOTE — PROGRESS NOTES
[facility]  Thad Cade Jr., M.D., F.A.C.S.  Hollie Huston M.D., M.P.H.  Chrissy Simons M.D.  Tatum Lambert, Ph.D., CCC-SLP  Gary Conner, Ph.D., CCC-SLP  Rhea Zepeda M.M. (voice), M.A., CCC-SLP  Teresa Watson M.S., CCC-SLP  Norma Argueta M.S., CCC-SLP  YOU Mcadams (voice), M.S., CCC-SLP    [facility]  VOICE EVALUATION/LARYNGEAL EXAMINATION REPORT    Patient: Nevin Alvarado  Date of Service: 6/23/2023    HISTORY  PATIENT INFORMATION  Nevin Alvarado was seen for brief consultation in conjunction with a visit to Dr. Simons today.  Please refer to the physician's dictation for a more complete history and impressions.     I joined to assist Dr. Simons with the laryngoscopy, but no skilled services were performed.     No charge for today's session  NO CHARGE FACILITY FEE/AK NO CHARGE LOS ([id number])    Trell Mcadams. (voice), M.S., CCC-SLP  Speech-Language Pathologist  [facility]  [phone]  [email or web address]  Pronouns: she/her/hers

## 2023-06-23 NOTE — CONFIDENTIAL NOTE
RECORDS RECEIVED FROM: internal      DATE RECEIVED: 6.29.23      NOTES STATUS DETAILS   OFFICE NOTE from referring provider       OFFICE NOTE from other specialist       DISCHARGE SUMMARY from hospital       DISCHARGE REPORT from the ER internal  7/29/22 Broddy   7/15/22 Anw    MEDICATION LIST internal      IMAGING  (NEED IMAGES AND REPORTS)       CT SCAN internal /ce Internal -2.18.23, 8/22/22, 2/8/22, 1.24.22, 3.13.21      [facility]- 1.10.23    CHEST XRAY (CXR) internal /ce Internal 2.61.23, 11.14.22, 11.3.22, 9.25.22 more in epic      [facility]- 12.15.22, 10.1.22     Scheduled 6.29.23   TESTS       PULMONARY FUNCTION TESTING (PFT) internal  Scheduled 6.29.23        Action 2.22.23 sv    Action Taken Image request sent to      [facility]- 1.10.23  CXR- 11.27.22     [facility]- CXR- 12.15.22, ---received --     [facility] 10.1.22---received --      Action 5.1.23 sv    Action Taken Image request sent to   [facility]- 1.10.23--received --  CXR- 11.27.22--received --

## 2023-06-27 ENCOUNTER — HOSPITAL ENCOUNTER (EMERGENCY)
Facility: CLINIC | Age: 33
Discharge: HOME | End: 2023-06-28
Attending: EMERGENCY MEDICINE
Payer: COMMERCIAL

## 2023-06-27 ENCOUNTER — APPOINTMENT (OUTPATIENT)
Dept: CT IMAGING | Facility: CLINIC | Age: 33
End: 2023-06-27
Attending: EMERGENCY MEDICINE
Payer: COMMERCIAL

## 2023-06-27 DIAGNOSIS — T07.XXXA MULTIPLE CONTUSIONS: ICD-10-CM

## 2023-06-27 DIAGNOSIS — M79.89 LEG SWELLING: ICD-10-CM

## 2023-06-27 DIAGNOSIS — R63.5 WEIGHT GAIN: ICD-10-CM
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
## 2023-06-27 LAB
ANION GAP SERPL CALCULATED.3IONS-SCNC: 9 MMOL/L (ref 7–15)
BASOPHILS # BLD AUTO: 0 10E3/UL (ref 0–0.2)
BASOPHILS NFR BLD AUTO: 1 %
BUN SERPL-MCNC: 11 MG/DL (ref 6–20)
CALCIUM SERPL-MCNC: 9.4 MG/DL (ref 8.6–10)
CHLORIDE SERPL-SCNC: 102 MMOL/L (ref 98–107)
CREAT SERPL-MCNC: 0.63 MG/DL (ref 0.51–0.95)
DEPRECATED HCO3 PLAS-SCNC: 27 MMOL/L (ref 22–29)
EOSINOPHIL # BLD AUTO: 0.3 10E3/UL (ref 0–0.7)
EOSINOPHIL NFR BLD AUTO: 3 %
ERYTHROCYTE [DISTWIDTH] IN BLOOD BY AUTOMATED COUNT: 13.5 % (ref 10–15)
GFR SERPL CREATININE-BSD FRML MDRD: >90 ML/MIN/1.73M2
GLUCOSE SERPL-MCNC: 129 MG/DL (ref 70–99)
HCG SER QL IA.RAPID: NEGATIVE
HCT VFR BLD AUTO: 37 % (ref 35–47)
HGB BLD-MCNC: 12.2 G/DL (ref 11.7–15.7)
IMM GRANULOCYTES # BLD: 0 10E3/UL
IMM GRANULOCYTES NFR BLD: 1 %
LYMPHOCYTES # BLD AUTO: 1.9 10E3/UL (ref 0.8–5.3)
LYMPHOCYTES NFR BLD AUTO: 23 %
MCH RBC QN AUTO: 29.5 PG (ref 26.5–33)
MCHC RBC AUTO-ENTMCNC: 33 G/DL (ref 31.5–36.5)
MCV RBC AUTO: 90 FL (ref 78–100)
MONOCYTES # BLD AUTO: 0.7 10E3/UL (ref 0–1.3)
MONOCYTES NFR BLD AUTO: 8 %
NEUTROPHILS # BLD AUTO: 5.6 10E3/UL (ref 1.6–8.3)
NEUTROPHILS NFR BLD AUTO: 64 %
NRBC # BLD AUTO: 0 10E3/UL
NRBC BLD AUTO-RTO: 0 /100
PLATELET # BLD AUTO: 278 10E3/UL (ref 150–450)
POTASSIUM SERPL-SCNC: 3.8 MMOL/L (ref 3.4–5.3)
RBC # BLD AUTO: 4.13 10E6/UL (ref 3.8–5.2)
SODIUM SERPL-SCNC: 138 MMOL/L (ref 136–145)
WBC # BLD AUTO: 8.5 10E3/UL (ref 4–11)

## 2023-06-27 PROCEDURE — 83880 ASSAY OF NATRIURETIC PEPTIDE: CPT

## 2023-06-27 PROCEDURE — 36415 COLL VENOUS BLD VENIPUNCTURE: CPT | Performed by: EMERGENCY MEDICINE

## 2023-06-27 PROCEDURE — 72128 CT CHEST SPINE W/O DYE: CPT

## 2023-06-27 PROCEDURE — 96374 THER/PROPH/DIAG INJ IV PUSH: CPT | Mod: 59

## 2023-06-27 PROCEDURE — 84443 ASSAY THYROID STIM HORMONE: CPT

## 2023-06-27 PROCEDURE — 99285 EMERGENCY DEPT VISIT HI MDM: CPT | Mod: 25

## 2023-06-27 PROCEDURE — 70450 CT HEAD/BRAIN W/O DYE: CPT

## 2023-06-27 PROCEDURE — 80048 BASIC METABOLIC PNL TOTAL CA: CPT | Performed by: EMERGENCY MEDICINE

## 2023-06-27 PROCEDURE — 72125 CT NECK SPINE W/O DYE: CPT

## 2023-06-27 PROCEDURE — 85025 COMPLETE CBC W/AUTO DIFF WBC: CPT | Performed by: EMERGENCY MEDICINE

## 2023-06-27 PROCEDURE — 84439 ASSAY OF FREE THYROXINE: CPT

## 2023-06-27 PROCEDURE — 72131 CT LUMBAR SPINE W/O DYE: CPT

## 2023-06-27 PROCEDURE — 84703 CHORIONIC GONADOTROPIN ASSAY: CPT

## 2023-06-27 PROCEDURE — 96375 TX/PRO/DX INJ NEW DRUG ADDON: CPT

## 2023-06-27 PROCEDURE — 250N000011 HC RX IP 250 OP 636: Mod: JZ | Performed by: EMERGENCY MEDICINE

## 2023-06-27 RX ORDER — MORPHINE SULFATE 4 MG/ML
4 INJECTION, SOLUTION INTRAMUSCULAR; INTRAVENOUS
Status: DISCONTINUED | OUTPATIENT
Start: 2023-06-27 | End: 2023-06-28 | Stop reason: HOSPADM

## 2023-06-27 RX ORDER — ONDANSETRON 2 MG/ML
4 INJECTION INTRAMUSCULAR; INTRAVENOUS EVERY 30 MIN PRN
Status: DISCONTINUED | OUTPATIENT
Start: 2023-06-27 | End: 2023-06-28 | Stop reason: HOSPADM

## 2023-06-27 RX ADMIN — MORPHINE SULFATE 4 MG: 4 INJECTION, SOLUTION INTRAMUSCULAR; INTRAVENOUS at 23:15

## 2023-06-27 RX ADMIN — ONDANSETRON 4 MG: 2 INJECTION INTRAMUSCULAR; INTRAVENOUS at 23:15

## 2023-06-27 ASSESSMENT — ACTIVITIES OF DAILY LIVING (ADL): ADLS_ACUITY_SCORE: 40

## 2023-06-28 ENCOUNTER — APPOINTMENT (OUTPATIENT)
Dept: GENERAL RADIOLOGY | Facility: CLINIC | Age: 33
End: 2023-06-28
Attending: EMERGENCY MEDICINE
Payer: COMMERCIAL

## 2023-06-28 ENCOUNTER — APPOINTMENT (OUTPATIENT)
Dept: CT IMAGING | Facility: CLINIC | Age: 33
End: 2023-06-28
Attending: EMERGENCY MEDICINE
Payer: COMMERCIAL

## 2023-06-28 VITALS
DIASTOLIC BLOOD PRESSURE: 86 MMHG | HEART RATE: 93 BPM | TEMPERATURE: 98 F | RESPIRATION RATE: 18 BRPM | BODY MASS INDEX: 53.92 KG/M2 | WEIGHT: 293 LBS | SYSTOLIC BLOOD PRESSURE: 156 MMHG | HEIGHT: 62 IN | OXYGEN SATURATION: 98 %

## 2023-06-28 PROCEDURE — 73610 X-RAY EXAM OF ANKLE: CPT | Mod: RT

## 2023-06-28 PROCEDURE — 250N000011 HC RX IP 250 OP 636: Mod: JZ | Performed by: EMERGENCY MEDICINE

## 2023-06-28 PROCEDURE — 96376 TX/PRO/DX INJ SAME DRUG ADON: CPT

## 2023-06-28 PROCEDURE — 250N000011 HC RX IP 250 OP 636: Performed by: EMERGENCY MEDICINE

## 2023-06-28 PROCEDURE — 73110 X-RAY EXAM OF WRIST: CPT | Mod: RT

## 2023-06-28 PROCEDURE — 250N000013 HC RX MED GY IP 250 OP 250 PS 637: Performed by: EMERGENCY MEDICINE

## 2023-06-28 PROCEDURE — 74177 CT ABD & PELVIS W/CONTRAST: CPT

## 2023-06-28 PROCEDURE — 96375 TX/PRO/DX INJ NEW DRUG ADDON: CPT

## 2023-06-28 RX ORDER — DIPHENHYDRAMINE HCL 25 MG
25 CAPSULE ORAL ONCE
Status: COMPLETED | OUTPATIENT
Start: 2023-06-28 | End: 2023-06-28

## 2023-06-28 RX ORDER — DIPHENHYDRAMINE HYDROCHLORIDE 50 MG/ML
25 INJECTION INTRAMUSCULAR; INTRAVENOUS ONCE
Status: COMPLETED | OUTPATIENT
Start: 2023-06-28 | End: 2023-06-28

## 2023-06-28 RX ORDER — CYCLOBENZAPRINE HCL 10 MG
5-10 TABLET ORAL 3 TIMES DAILY PRN
Qty: 20 TABLET | Refills: 0 | Status: ON HOLD | OUTPATIENT
Start: 2023-06-28 | End: 2023-10-16

## 2023-06-28 RX ORDER — DEXAMETHASONE SODIUM PHOSPHATE 10 MG/ML
10 INJECTION, SOLUTION INTRAMUSCULAR; INTRAVENOUS ONCE
Status: COMPLETED | OUTPATIENT
Start: 2023-06-28 | End: 2023-06-28

## 2023-06-28 RX ORDER — IOPAMIDOL 755 MG/ML
500 INJECTION, SOLUTION INTRAVASCULAR ONCE
Status: COMPLETED | OUTPATIENT
Start: 2023-06-28 | End: 2023-06-28

## 2023-06-28 RX ADMIN — DEXAMETHASONE SODIUM PHOSPHATE 10 MG: 10 INJECTION, SOLUTION INTRAMUSCULAR; INTRAVENOUS at 00:27

## 2023-06-28 RX ADMIN — DIPHENHYDRAMINE HYDROCHLORIDE 25 MG: 50 INJECTION INTRAMUSCULAR; INTRAVENOUS at 00:28

## 2023-06-28 RX ADMIN — MORPHINE SULFATE 4 MG: 4 INJECTION, SOLUTION INTRAMUSCULAR; INTRAVENOUS at 01:05

## 2023-06-28 RX ADMIN — DIPHENHYDRAMINE HYDROCHLORIDE 25 MG: 25 CAPSULE ORAL at 01:36

## 2023-06-28 RX ADMIN — IOPAMIDOL 100 ML: 755 INJECTION, SOLUTION INTRAVENOUS at 00:07

## 2023-06-28 ASSESSMENT — ACTIVITIES OF DAILY LIVING (ADL)
ADLS_ACUITY_SCORE: 40
ADLS_ACUITY_SCORE: 40

## 2023-06-28 NOTE — ED PROVIDER NOTES
History     Chief Complaint:  Fall       The history is provided by the patient.      Nevin Alvarado is a right-handed 32 year old female with history of Diabetes mellitus, scoliosis, and BPD who presents to the ED via EMS alone for evaluation of a fall. Patient reports she was descending stairs when she was unable to move her foot, causing her to fall onto her right arm and back. She does not remember if she lost consciousness. She endorses nausea and pain in her right wrist, right ankle, and along her spine from her head and neck to mid-back. She notes neck and back pain worsens while sitting up. She also endorses throbbing in her right hip in her back. Patient denies facial pain, chest pain, or abdominal pain. She notes pre-existing torn ACL.    Independent Historian:   None - Patient Only    Medications:    Albuterol  Rexulti  Ferosul  Lasix  Zyrtec  Pristiq  Plaquenil  Toradol  Glucophage  Singulair  Prilosec  Zofran  Lyrica  Imitrex  Valtrex  Carafate  Rybelsus    Past Medical History:    ADD  Anorexia nervosa with bulimia  Anxiety  Asthma  BPD  Depression  History of self harm  History of suicide attempt (overdose)  Morbid obesity  Polyneuropathy  PTSD  Pulmonary embolism  Rectal foreign body - recurrent  ZOHRA on CPAP  Syncope  Diabetes mellitus, type 2  Reactive airway disease  Cholelithiasis  CIDP  Esophageal tear  SNHL, left ear  Tachycardia  Hypertension  Migraine  Scoliosis  GERD    Past Surgical History:    Carpal tunnel release  Breast reduction  Lymphadenectomy  EGD with fb removal x8  EGD x4  Power port placement, right  Port removal, right  Sigmoidoscopy, fb removal  Laparoscopic appendectomy with fb removal  Cholecystectomy  Gallbladder removal    Physical Exam     Patient Vitals for the past 24 hrs:   BP Temp Temp src Pulse Resp SpO2 Height Weight   06/28/23 0200 (!) 156/86 -- -- 93 -- 98 % -- --   06/28/23 0100 132/86 -- -- 91 -- 99 % -- --   06/27/23 2138 (!) 163/86 -- -- -- -- -- -- --  "  06/27/23 2137 -- -- -- -- 18 -- -- --   06/27/23 2136 -- 98  F (36.7  C) Oral 93 -- 95 % 1.575 m (5' 2\") 137.9 kg (304 lb)       Physical Exam  General: Patient is alert, awake and interactive when I enter the room  Head: The scalp, face, and head appear normal. Atraumatic.   Eyes: The pupils are equal, round, and reactive to light. Conjunctivae and sclerae are normal  ENT: No hemotympanum or signs basilar skull fracture. The oropharynx is normal without erythema. No tenderness to palpation of the face, nose or jaw.   Neck: c collar in place, diffuse posterior cervical spine tenderness   CV: Regular rate. S1/S2. No murmurs.   Resp: Lungs are clear without wheezes or rales. No distress. No crepitance.   GI: Abdomen is soft, no rigidity, guarding, or rebound. No contusion. No distension. No tenderness to palpation in any quadran  MS: Diffuse tenderness throughout her thoracic and lumbar paraspinal musculature.  No focal midline thoracic or lumbar tenderness.  She also has some pain in her right wrist and right ankle.  However she has normal passive range of motion at all major joints.  Skin: No rash or lesions noted. Normal capillary refill noted  Neuro: GCS 15.  CN\"s II-XII intact. Speech is normal and fluent. Face is symmetric. Strength is normal and symmetric.   Psych: Normal affect.  Appropriate interactions.    Emergency Department Course     Imaging:  XR Wrist Right G/E 3 Views   Final Result   IMPRESSION: Normal joint spaces and alignment. No fracture. Soft tissue adiposity.      Ankle XR, G/E 3 views, right   Final Result   IMPRESSION: Normal joint spaces and alignment. No fracture.      Lumbar spine CT w/o contrast   Final Result   IMPRESSION:   1. No evidence of acute lumbar spine fracture.   2. Good anatomic alignment and vertebral body heights.   3. No significant canal compromise and neural foraminal narrowing throughout lumbar spine.      CT Thoracic Spine w/o Contrast   Final Result   IMPRESSION:   1. " No evidence of acute thoracic spine fracture.   2. Good anatomic alignment and vertebral body heights maintained.   3. No significant canal compromise or neural foraminal narrowing throughout thoracic spine.      CT Chest/Abdomen/Pelvis w Contrast   Final Result   IMPRESSION:   No acute traumatic injury in the chest, abdomen or pelvis.      CT Cervical Spine w/o Contrast   Final Result   IMPRESSION:   HEAD CT:   1.  No CT finding of a mass, hemorrhage or focal area suggestive of acute infarct.   2.  Partially empty sella.      CERVICAL SPINE CT:   1.  No CT evidence for acute fracture or post traumatic subluxation.   2.  No significant canal compromise or neural foraminal narrowing throughout cervical spine.      CT Head w/o Contrast   Final Result   IMPRESSION:   HEAD CT:   1.  No CT finding of a mass, hemorrhage or focal area suggestive of acute infarct.   2.  Partially empty sella.      CERVICAL SPINE CT:   1.  No CT evidence for acute fracture or post traumatic subluxation.   2.  No significant canal compromise or neural foraminal narrowing throughout cervical spine.         Report per radiology    Laboratory:  Labs Ordered and Resulted from Time of ED Arrival to Time of ED Departure   BASIC METABOLIC PANEL - Abnormal       Result Value    Sodium 138      Potassium 3.8      Chloride 102      Carbon Dioxide (CO2) 27      Anion Gap 9      Urea Nitrogen 11.0      Creatinine 0.63      Calcium 9.4      Glucose 129 (*)     GFR Estimate >90     ISTAT HCG QUALITATIVE PREGNANCY POCT - Normal    HCG Qualitative POCT Negative     CBC WITH PLATELETS AND DIFFERENTIAL    WBC Count 8.5      RBC Count 4.13      Hemoglobin 12.2      Hematocrit 37.0      MCV 90      MCH 29.5      MCHC 33.0      RDW 13.5      Platelet Count 278      % Neutrophils 64      % Lymphocytes 23      % Monocytes 8      % Eosinophils 3      % Basophils 1      % Immature Granulocytes 1      NRBCs per 100 WBC 0      Absolute Neutrophils 5.6      Absolute  Lymphocytes 1.9      Absolute Monocytes 0.7      Absolute Eosinophils 0.3      Absolute Basophils 0.0      Absolute Immature Granulocytes 0.0      Absolute NRBCs 0.0         Emergency Department Course & Assessments:    Interventions:  Medications   iopamidol (ISOVUE-370) solution 500 mL (100 mLs Intravenous $Given 6/28/23 0007)   sodium chloride (PF) 0.9% PF flush 100 mL (65 mLs Intravenous $Given 6/28/23 0007)   dexamethasone PF (DECADRON) injection 10 mg (10 mg Intravenous $Given 6/28/23 0027)   diphenhydrAMINE (BENADRYL) injection 25 mg (25 mg Intravenous $Given 6/28/23 0028)   diphenhydrAMINE (BENADRYL) capsule 25 mg (25 mg Oral $Given 6/28/23 0136)      Assessments:  2228 I obtained history and examined the patient as noted above.  0106 I rechecked the patient and explained findings.     Independent Interpretation (X-rays, CTs, rhythm strip):  XR negative for fracture     Disposition:  The patient was discharged to home.     Impression & Plan      Medical Decision Making:  This is a 32-year-old female with complex past psychiatric history who presents to the emergency department today after mechanical fall down 4 stairs.  She notes she fell on her right side and hit her head but does not believe that she lost consciousness.  She reports pain throughout her neck, upper back and right side.  Upon initial evaluation she is hypertensive but otherwise hemodynamically stable with no vital signs.  She is afebrile.  She is oxygenating well on room air.  Physical exam as detailed above.  Broad trauma evaluation was performed but ultimately negative.  No signs of significant traumatic injury.  Patient was able to pass ambulation trial with walker which is what she uses at home.  Patient be discharged back to her group home in stable condition.    Diagnosis:    ICD-10-CM    1. Fall, initial encounter  W19.XXXA       2. Multiple contusions  T07.XXXA            Discharge Medications:  Discharge Medication List as of  6/28/2023  1:38 AM      START taking these medications    Details   cyclobenzaprine (FLEXERIL) 10 MG tablet Take 0.5-1 tablets (5-10 mg) by mouth 3 times daily as needed for muscle spasms, Disp-20 tablet, R-0, Local Print                Scribe Disclosure:  TERRI, Karina Cervantes, am serving as a scribe at 11:42 PM on 6/27/2023 to document services personally performed by Campbell Posada MD based on my observations and the provider's statements to me.   6/27/2023   Campbell Posada MD Battista, Christopher Joseph, MD  06/28/23 [id number]

## 2023-06-28 NOTE — ED TRIAGE NOTES
BIBA from [facility] [address] [place]  No phone number available to give EMS for group home  At group home for self harm, eats objects  CC: Pt fell down 4 stairs today hitting her head. Pt has neck pain, per ems. 30mg toradol given ems.

## 2023-06-28 NOTE — ED TRIAGE NOTES
"Pt states she fell going down stairs. States that she has a drop foot in left foot and that she was trying to lift her foot when she fell. States she rolled her right ankle when she fell. C/o neck pain and mid back pain. Pt does not believe she had LOC, states \"it all happened so quick\".     "

## 2023-06-29 ENCOUNTER — PRE VISIT (OUTPATIENT)
Dept: PULMONOLOGY | Facility: CLINIC | Age: 33
End: 2023-06-29

## 2023-06-29 ENCOUNTER — ANCILLARY PROCEDURE (OUTPATIENT)
Dept: GENERAL RADIOLOGY | Facility: CLINIC | Age: 33
End: 2023-06-29
Attending: INTERNAL MEDICINE
Payer: COMMERCIAL

## 2023-06-29 ENCOUNTER — OFFICE VISIT (OUTPATIENT)
Dept: PULMONOLOGY | Facility: CLINIC | Age: 33
End: 2023-06-29
Attending: INTERNAL MEDICINE
Payer: COMMERCIAL

## 2023-06-29 VITALS — DIASTOLIC BLOOD PRESSURE: 77 MMHG | HEART RATE: 82 BPM | OXYGEN SATURATION: 97 % | SYSTOLIC BLOOD PRESSURE: 126 MMHG

## 2023-06-29 DIAGNOSIS — R05.9 COUGH: ICD-10-CM

## 2023-06-29 DIAGNOSIS — R63.5 WEIGHT GAIN: ICD-10-CM

## 2023-06-29 DIAGNOSIS — J34.89 NASAL SEPTAL PERFORATION: ICD-10-CM

## 2023-06-29 DIAGNOSIS — M79.89 LEG SWELLING: Primary | ICD-10-CM

## 2023-06-29 LAB
NT-PROBNP SERPL-MCNC: 39 PG/ML (ref 0–450)
T4 FREE SERPL-MCNC: 1.17 NG/DL (ref 0.9–1.7)
TSH SERPL DL<=0.005 MIU/L-ACNC: 4.47 UIU/ML (ref 0.3–4.2)

## 2023-06-29 PROCEDURE — 94726 PLETHYSMOGRAPHY LUNG VOLUMES: CPT | Performed by: INTERNAL MEDICINE

## 2023-06-29 PROCEDURE — 94729 DIFFUSING CAPACITY: CPT | Performed by: INTERNAL MEDICINE

## 2023-06-29 PROCEDURE — 99204 OFFICE O/P NEW MOD 45 MIN: CPT | Mod: 25 | Performed by: INTERNAL MEDICINE

## 2023-06-29 PROCEDURE — 94375 RESPIRATORY FLOW VOLUME LOOP: CPT | Performed by: INTERNAL MEDICINE

## 2023-06-29 PROCEDURE — G0463 HOSPITAL OUTPT CLINIC VISIT: HCPCS | Performed by: INTERNAL MEDICINE

## 2023-06-29 PROCEDURE — 71046 X-RAY EXAM CHEST 2 VIEWS: CPT | Mod: GC | Performed by: RADIOLOGY

## 2023-06-29 ASSESSMENT — ENCOUNTER SYMPTOMS
HEMOPTYSIS: 0
MYALGIAS: 1
MEMORY LOSS: 1
TREMORS: 0
SHORTNESS OF BREATH: 1
STRIDOR: 0
FALLS: 1
WEIGHT LOSS: 0
SPEECH CHANGE: 0
WHEEZING: 0
NERVOUS/ANXIOUS: 1
HEMATURIA: 0
FEVER: 0
SPUTUM PRODUCTION: 0
SORE THROAT: 0
HEARTBURN: 1
CHILLS: 0
COUGH: 0

## 2023-06-29 NOTE — PROGRESS NOTES
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 Chris Lira MD;  Location: [facility];  Service: Gastroenterology     UT ESOPHAGOGASTRODUODENOSCOPY TRANSORAL DIAGNOSTIC N/A 10/6/2019    Procedure: ESOPHAGOGASTRODUODENOSCOPY (EGD) with attempted foreign body removal;  Surgeon: Felipe Connolly MD;  Location: [facility];  Service: Gastroenterology     UT ESOPHAGOGASTRODUODENOSCOPY TRANSORAL DIAGNOSTIC N/A 12/15/2019    Procedure: ESOPHAGOGASTRODUODENOSCOPY (EGD) with foreign body removal;  Surgeon: Jeffy Zuñiga MD;  Location: [facility];  Service: Gastroenterology      CO ESOPHAGOGASTRODUODENOSCOPY TRANSORAL DIAGNOSTIC N/A 12/17/2019    Procedure: ESOPHAGOGASTRODUODENOSCOPY (EGD) with attempted foreign body removal;  Surgeon: Felipe Connolly MD;  Location: [facility];  Service: Gastroenterology     CO ESOPHAGOGASTRODUODENOSCOPY TRANSORAL DIAGNOSTIC N/A 12/21/2019    Procedure: ESOPHAGOGASTRODUODENOSCOPY (EGD) FOR FROEIGN BODY REMOVAL;  Surgeon: Binu Vigil MD;  Location: [facility];  Service: Gastroenterology     CO ESOPHAGOGASTRODUODENOSCOPY TRANSORAL DIAGNOSTIC N/A 7/22/2020    Procedure: ESOPHAGOGASTRODUODENOSCOPY (EGD);  Surgeon: Bailey Arnold MD;  Location: [facility];  Service: Gastroenterology     CO ESOPHAGOGASTRODUODENOSCOPY TRANSORAL DIAGNOSTIC N/A 8/14/2020    Procedure: ESOPHAGOGASTRODUODENOSCOPY (EGD) FOREIGN BODY REMOVAL;  Surgeon: Jeffy Zuñiga MD;  Location: [facility];  Service: Gastroenterology     CO ESOPHAGOGASTRODUODENOSCOPY TRANSORAL DIAGNOSTIC N/A 2/25/2021    Procedure: ESOPHAGOGASTRODUODENOSCOPY (EGD) with foreign body reoval;  Surgeon: Bird Sethi MD;  Location: [facility];  Service: Gastroenterology     CO ESOPHAGOGASTRODUODENOSCOPY TRANSORAL DIAGNOSTIC N/A 4/19/2021    Procedure: ESOPHAGOGASTRODUODENOSCOPY (EGD);  Surgeon: Libia Rose MD;  Location: [facility];  Service: Gastroenterology     CO SURG DIAGNOSTIC EXAM, ANORECTAL N/A 2/5/2020    Procedure: EXAM UNDER ANESTHESIA, Flexible Sigmoidoscopy, Retrieval of Foreign Body;  Surgeon: Sasha Ivan MD;  Location: [facility];  Service: General     RELEASE CARPAL TUNNEL Bilateral      RELEASE CARPAL TUNNEL Bilateral      REMOVAL, FOREIGN BODY, RECTUM N/A 7/21/2021    Procedure: MANUAL RETREIVALOF FOREIGN OBJECT- RECTUM.;  Surgeon: Aleksandra Gerber MD;  Location: [facility]     SIGMOIDOSCOPY FLEXIBLE N/A 1/10/2017    Procedure: SIGMOIDOSCOPY FLEXIBLE;  Surgeon: Annmarie Haynes MD;  Location: [facility]      SIGMOIDOSCOPY FLEXIBLE N/A 5/8/2018    Procedure: SIGMOIDOSCOPY FLEXIBLE;  flex sig with foreign body removal using snare and rattooth forcep;  Surgeon: Soham Cano MD;  Location:  [facility]     SIGMOIDOSCOPY FLEXIBLE N/A 2/20/2019    Procedure: Exam under anesthesia Colonoscopy with attempted  removal of rectal foreign body;  Surgeon: Estrada Chávez MD;  Location: [facility]       Social History     Socioeconomic History     Marital status: Single     Spouse name: Not on file     Number of children: Not on file     Years of education: Not on file     Highest education level: Not on file   Occupational History     Occupation: On disability   Tobacco Use     Smoking status: Never     Smokeless tobacco: Never   Vaping Use     Vaping Use: Not on file   Substance and Sexual Activity     Alcohol use: No     Alcohol/week: 0.0 standard drinks of alcohol     Drug use: No     Sexual activity: Not Currently     Partners: Male     Birth control/protection: I.U.D.     Comment: IUD - Mirena - placed July, 2015   Other Topics Concern     Parent/sibling w/ CABG, MI or angioplasty before 65F 55M? Not Asked   Social History Narrative    Single.    Living in independent living portion of [employer].    On disability.    No regular exercise.      Social Determinants of Health     Financial Resource Strain: Not on file   Food Insecurity: Not on file   Transportation Needs: Not on file   Physical Activity: Not on file   Stress: Not on file   Social Connections: Not on file   Intimate Partner Violence: Not on file   Housing Stability: Not on file       Family History   Problem Relation Age of Onset     Diabetes Type 2  Maternal Grandmother      Diabetes Type 2  Paternal Grandmother      Breast Cancer Paternal Grandmother      Cerebrovascular Disease Father 53     Myocardial Infarction No family hx of      Coronary Artery Disease Early Onset No family hx of      Ovarian Cancer No family hx of      Colon Cancer No family hx of       Depression Mother      Anxiety Disorder Mother          Vitals: /77   Pulse 82   SpO2 97%     Exam:   GENERAL APPEARANCE: In wheel chair, not in distress, alert, and in no apparent distress.  LYMPHATICS: No significant cervical, or supraclavicular nodes.  HEENT: Normal oral mucus membranes, tongue and dentition. Normal nose.   RESP: good air flow throughout.  No crackles. No rhonchi. No wheezes.  CV: Normal S1, S2, regular rhythm, normal rate. No murmur.  No LE edema.   ABD: Soft, lax, nontender.  MS: extremities normal. No clubbing. No cyanosis.  SKIN: no rash on limited exam.  NEURO: Mentation intact, speech normal, normal gait and stance.  PSYCH: mentation appears normal. and affect normal/bright.    Results:  PFT 6/29/23:    My interpretation: Moderate extra-thoracic restrictive lung disease, reduced alveolar volume and elevated DLCO most consistent with obesity-related restriction. No obstruction.     Chest imaging:  Reviewed CT chest from yesterday and compared to 2/18/23, bibasilar dependent atelectasis, no parenchymal lung abnormalities.       Assessment and plan:   1.  Extra-thoracic restrictive lung disease  Patient is 32 years old female with past medical history outlined below presents today for assessment of exertional dyspnea and chronic intermittent cough.  Her cough seems to be related to viral infections and completely subsides between infections for several months.  Shortness of breath appears to be multifactorial, but mostly related to deconditioning, extrathoracic restriction as shown by PFT [moderate], and obesity.  Recent hemoglobin is within normal limits ruling out anemia as a factor.  TSH has not been checked since February 2023 and it was mildly elevated at the time, she reports recent weight gain.  Chronic thromboembolic pulmonary disease possibility, however her DLCO is supranormal arguing against that.  She does have new leg swelling despite lasix.   Plan:   - D-dimer, and if  elevated will request U/S r/o DVT, if negative for DVT, we will consider CTA   - NTproBNP and recheck Echo if elevated (echo was normal around 1 year ago)  - TSH with reflex     2. Atelectasis, mild   - Encourage mobility with walker as safe with orthopaedics     3. Morbid obesity BMI 57  Motivated to lose weight but very limited with right torn ACL and meniscal injury  - Weight management referral placed per patient request     4. Remote history of provoked PE following esophageal surgery, completed eliquis 3 months in 2020    5. Perforated nasal septum with epistaxis  Unclear etiology, sees ENT  - ANCA with reflex     6. DM   7. GERD  - Precautions provided     Return to clinic PRN

## 2023-06-29 NOTE — LETTER
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  Surgeon: Chris Lira MD;  Location: [facility] OR;  Service: Gastroenterology    KS ESOPHAGOGASTRODUODENOSCOPY TRANSORAL DIAGNOSTIC N/A 10/6/2019    Procedure: ESOPHAGOGASTRODUODENOSCOPY (EGD) with attempted foreign body removal;  Surgeon: Felipe Connolly MD;  Location: [facility];  Service: Gastroenterology    KS ESOPHAGOGASTRODUODENOSCOPY TRANSORAL DIAGNOSTIC N/A 12/15/2019    Procedure:  ESOPHAGOGASTRODUODENOSCOPY (EGD) with foreign body removal;  Surgeon: Jeffy Zuñiga MD;  Location: [facility];  Service: Gastroenterology    DE ESOPHAGOGASTRODUODENOSCOPY TRANSORAL DIAGNOSTIC N/A 12/17/2019    Procedure: ESOPHAGOGASTRODUODENOSCOPY (EGD) with attempted foreign body removal;  Surgeon: Felipe Connolly MD;  Location: [facility];  Service: Gastroenterology    DE ESOPHAGOGASTRODUODENOSCOPY TRANSORAL DIAGNOSTIC N/A 12/21/2019    Procedure: ESOPHAGOGASTRODUODENOSCOPY (EGD) FOR FROEIGN BODY REMOVAL;  Surgeon: Binu Vigil MD;  Location: [facility];  Service: Gastroenterology    DE ESOPHAGOGASTRODUODENOSCOPY TRANSORAL DIAGNOSTIC N/A 7/22/2020    Procedure: ESOPHAGOGASTRODUODENOSCOPY (EGD);  Surgeon: Bailey Arnold MD;  Location: [facility];  Service: Gastroenterology    DE ESOPHAGOGASTRODUODENOSCOPY TRANSORAL DIAGNOSTIC N/A 8/14/2020    Procedure: ESOPHAGOGASTRODUODENOSCOPY (EGD) FOREIGN BODY REMOVAL;  Surgeon: Jeffy Zuñiga MD;  Location: [facility];  Service: Gastroenterology    DE ESOPHAGOGASTRODUODENOSCOPY TRANSORAL DIAGNOSTIC N/A 2/25/2021    Procedure: ESOPHAGOGASTRODUODENOSCOPY (EGD) with foreign body reoval;  Surgeon: Bird Sethi MD;  Location: [facility];  Service: Gastroenterology    DE ESOPHAGOGASTRODUODENOSCOPY TRANSORAL DIAGNOSTIC N/A 4/19/2021    Procedure: ESOPHAGOGASTRODUODENOSCOPY (EGD);  Surgeon: Libia Rose MD;  Location: [facility];  Service: Gastroenterology    DE SURG DIAGNOSTIC EXAM, ANORECTAL N/A 2/5/2020    Procedure: EXAM UNDER ANESTHESIA, Flexible Sigmoidoscopy, Retrieval of Foreign Body;  Surgeon: Sasha Ivan MD;  Location: [facility];  Service: General    RELEASE CARPAL TUNNEL Bilateral     RELEASE CARPAL TUNNEL Bilateral     REMOVAL, FOREIGN BODY, RECTUM N/A 7/21/2021    Procedure: MANUAL RETREIVALOF FOREIGN OBJECT- RECTUM.;  Surgeon: Aleksandra Gerber MD;  Location: [facility] OR     SIGMOIDOSCOPY FLEXIBLE N/A 1/10/2017    Procedure: SIGMOIDOSCOPY FLEXIBLE;  Surgeon: Annmarie Haynes MD;  Location: [facility] OR    SIGMOIDOSCOPY FLEXIBLE N/A 5/8/2018    Procedure: SIGMOIDOSCOPY FLEXIBLE;  flex sig with foreign body removal using snare and rattooth forcep;  Surgeon: Soham Cano MD;  Location:  GI    SIGMOIDOSCOPY FLEXIBLE N/A 2/20/2019    Procedure: Exam under anesthesia Colonoscopy with attempted  removal of rectal foreign body;  Surgeon: Estrada Chávez MD;  Location: [facility] OR       Social History     Socioeconomic History    Marital status: Single     Spouse name: Not on file    Number of children: Not on file    Years of education: Not on file    Highest education level: Not on file   Occupational History    Occupation: On disability   Tobacco Use    Smoking status: Never    Smokeless tobacco: Never   Vaping Use    Vaping Use: Not on file   Substance and Sexual Activity    Alcohol use: No     Alcohol/week: 0.0 standard drinks of alcohol    Drug use: No    Sexual activity: Not Currently     Partners: Male     Birth control/protection: I.U.D.     Comment: IUD - Mirena - placed July, 2015   Other Topics Concern    Parent/sibling w/ CABG, MI or angioplasty before 65F 55M? Not Asked   Social History Narrative    Single.    Living in independent living portion of [employer].    On disability.    No regular exercise.      Social Determinants of Health     Financial Resource Strain: Not on file   Food Insecurity: Not on file   Transportation Needs: Not on file   Physical Activity: Not on file   Stress: Not on file   Social Connections: Not on file   Intimate Partner Violence: Not on file   Housing Stability: Not on file       Family History   Problem Relation Age of Onset    Diabetes Type 2  Maternal Grandmother     Diabetes Type 2  Paternal Grandmother     Breast Cancer Paternal Grandmother     Cerebrovascular Disease Father 53    Myocardial Infarction No family hx of     Coronary  Artery Disease Early Onset No family hx of     Ovarian Cancer No family hx of     Colon Cancer No family hx of     Depression Mother     Anxiety Disorder Mother          Vitals: /77   Pulse 82   SpO2 97%     Exam:   GENERAL APPEARANCE: In wheel chair, not in distress, alert, and in no apparent distress.  LYMPHATICS: No significant cervical, or supraclavicular nodes.  HEENT: Normal oral mucus membranes, tongue and dentition. Normal nose.   RESP: good air flow throughout.  No crackles. No rhonchi. No wheezes.  CV: Normal S1, S2, regular rhythm, normal rate. No murmur.  No LE edema.   ABD: Soft, lax, nontender.  MS: extremities normal. No clubbing. No cyanosis.  SKIN: no rash on limited exam.  NEURO: Mentation intact, speech normal, normal gait and stance.  PSYCH: mentation appears normal. and affect normal/bright.    Results:  PFT 6/29/23:    My interpretation: Moderate extra-thoracic restrictive lung disease, reduced alveolar volume and elevated DLCO most consistent with obesity-related restriction. No obstruction.     Chest imaging:  Reviewed CT chest from yesterday and compared to 2/18/23, bibasilar dependent atelectasis, no parenchymal lung abnormalities.       Assessment and plan:    Extra-thoracic restrictive lung disease  Patient is 32 years old female with past medical history outlined below presents today for assessment of exertional dyspnea and chronic intermittent cough.  Her cough seems to be related to viral infections and completely subsides between infections for several months.  Shortness of breath appears to be multifactorial, but mostly related to deconditioning, extrathoracic restriction as shown by PFT [moderate], and obesity.  Recent hemoglobin is within normal limits ruling out anemia as a factor.  TSH has not been checked since February 2023 and it was mildly elevated at the time, she reports recent weight gain.  Chronic thromboembolic pulmonary disease possibility, however her DLCO is  supranormal arguing against that.  She does have new leg swelling despite lasix.   Plan:   - D-dimer, and if elevated will request U/S r/o DVT, if negative for DVT, we will consider CTA   - NTproBNP and recheck Echo if elevated (echo was normal around 1 year ago)  - TSH with reflex     2. Atelectasis, mild   - Encourage mobility with walker as safe with orthopaedics     3. Morbid obesity BMI 57  Motivated to lose weight but very limited with right torn ACL and meniscal injury  - Weight management referral placed per patient request     4. Remote history of provoked PE following esophageal surgery, completed eliquis 3 months in 2020    5. Perforated nasal septum with epistaxis  Unclear etiology, sees ENT  - ANCA with reflex     6. DM   7. GERD  - Precautions provided     Return to clinic PRN     Again, thank you for allowing me to participate in the care of your patient.        Sincerely,        Young Weiss MD

## 2023-06-29 NOTE — NURSING NOTE
Chief Complaint   Patient presents with     General Visit     New pt cough     Vitals were taken and medications were reconciled.     RUDY Childress

## 2023-07-03 ENCOUNTER — TELEPHONE (OUTPATIENT)
Dept: PULMONOLOGY | Facility: CLINIC | Age: 33
End: 2023-07-03
Payer: COMMERCIAL

## 2023-07-05 LAB
DLCOCOR-%PRED-PRE: 118 %
DLCOCOR-PRE: 24.73 ML/MIN/MMHG
DLCOUNC-%PRED-PRE: 113 %
DLCOUNC-PRE: 23.76 ML/MIN/MMHG
DLCOUNC-PRED: 20.93 ML/MIN/MMHG
ERV-%PRED-PRE: 9 %
ERV-PRE: 0.12 L
ERV-PRED: 1.18 L
EXPTIME-PRE: 5.11 SEC
FEF2575-%PRED-PRE: 61 %
FEF2575-PRE: 1.96 L/SEC
FEF2575-PRED: 3.21 L/SEC
FEFMAX-%PRED-PRE: 45 %
FEFMAX-PRE: 3.02 L/SEC
FEFMAX-PRED: 6.7 L/SEC
FEV1-%PRED-PRE: 61 %
FEV1-PRE: 1.72 L
FEV1FEV6-PRE: 88 %
FEV1FEV6-PRED: 85 %
FEV1FVC-PRE: 86 %
FEV1FVC-PRED: 86 %
FEV1SVC-PRE: 82 %
FEV1SVC-PRED: 75 %
FIFMAX-PRE: 1.48 L/SEC
FRCPLETH-%PRED-PRE: 56 %
FRCPLETH-PRE: 1.33 L
FRCPLETH-PRED: 2.37 L
FVC-%PRED-PRE: 61 %
FVC-PRE: 2 L
FVC-PRED: 3.25 L
IC-%PRED-PRE: 82 %
IC-PRE: 2 L
IC-PRED: 2.42 L
RVPLETH-%PRED-PRE: 110 %
RVPLETH-PRE: 1.22 L
RVPLETH-PRED: 1.1 L
TLCPLETH-%PRED-PRE: 69 %
TLCPLETH-PRE: 3.33 L
TLCPLETH-PRED: 4.81 L
VA-%PRED-PRE: 61 %
VA-PRE: 2.78 L
VC-%PRED-PRE: 56 %
VC-PRE: 2.11 L
VC-PRED: 3.72 L

## 2023-07-06 ENCOUNTER — TELEPHONE (OUTPATIENT)
Dept: ENDOCRINOLOGY | Facility: CLINIC | Age: 33
End: 2023-07-06
Payer: COMMERCIAL

## 2023-07-06 NOTE — TELEPHONE ENCOUNTER
General Call      Reason for Call:  hans Alonso pt can't ever do Tuesday class. Pls call to set up 1:1       Could we send this information to you in [facility] or would you prefer to receive a phone call?:   Patient would prefer a phone call   Okay to leave a detailed message?: Yes at Cell number on file:    Telephone Information:   Mobile [phone]

## 2023-07-07 ENCOUNTER — PRE VISIT (OUTPATIENT)
Dept: OTOLARYNGOLOGY | Facility: CLINIC | Age: 33
End: 2023-07-07

## 2023-07-07 ENCOUNTER — PRE VISIT (OUTPATIENT)
Dept: NEUROLOGY | Facility: CLINIC | Age: 33
End: 2023-07-07

## 2023-07-07 ENCOUNTER — LAB (OUTPATIENT)
Dept: LAB | Facility: CLINIC | Age: 33
End: 2023-07-07
Payer: COMMERCIAL

## 2023-07-07 ENCOUNTER — MYC MEDICAL ADVICE (OUTPATIENT)
Dept: PULMONOLOGY | Facility: CLINIC | Age: 33
End: 2023-07-07

## 2023-07-07 ENCOUNTER — OFFICE VISIT (OUTPATIENT)
Dept: NEUROLOGY | Facility: CLINIC | Age: 33
End: 2023-07-07
Attending: FAMILY MEDICINE
Payer: COMMERCIAL

## 2023-07-07 ENCOUNTER — TELEPHONE (OUTPATIENT)
Dept: NEUROLOGY | Facility: CLINIC | Age: 33
End: 2023-07-07

## 2023-07-07 VITALS
SYSTOLIC BLOOD PRESSURE: 144 MMHG | DIASTOLIC BLOOD PRESSURE: 73 MMHG | HEART RATE: 80 BPM | OXYGEN SATURATION: 100 % | RESPIRATION RATE: 16 BRPM

## 2023-07-07 DIAGNOSIS — R79.89 ELEVATED D-DIMER: Primary | ICD-10-CM

## 2023-07-07 DIAGNOSIS — G57.11 MERALGIA PARESTHETICA OF RIGHT SIDE: ICD-10-CM

## 2023-07-07 DIAGNOSIS — J34.89 NASAL SEPTAL PERFORATION: ICD-10-CM

## 2023-07-07 DIAGNOSIS — M79.89 LEG SWELLING: ICD-10-CM

## 2023-07-07 LAB — D DIMER PPP FEU-MCNC: 1.2 UG/ML FEU (ref 0–0.5)

## 2023-07-07 PROCEDURE — 99205 OFFICE O/P NEW HI 60 MIN: CPT | Performed by: PSYCHIATRY & NEUROLOGY

## 2023-07-07 PROCEDURE — 86036 ANCA SCREEN EACH ANTIBODY: CPT | Performed by: INTERNAL MEDICINE

## 2023-07-07 PROCEDURE — 99000 SPECIMEN HANDLING OFFICE-LAB: CPT | Performed by: PATHOLOGY

## 2023-07-07 PROCEDURE — 36415 COLL VENOUS BLD VENIPUNCTURE: CPT | Performed by: PATHOLOGY

## 2023-07-07 PROCEDURE — 85379 FIBRIN DEGRADATION QUANT: CPT | Performed by: INTERNAL MEDICINE

## 2023-07-07 ASSESSMENT — PAIN SCALES - GENERAL: PAINLEVEL: SEVERE PAIN (6)

## 2023-07-07 NOTE — TELEPHONE ENCOUNTER
Called [facility], spoke to Paris and she stated [facility] does not do any compounding and they would not be able to process a script for Gabapentin cream.

## 2023-07-07 NOTE — PATIENT INSTRUCTIONS
Keep EMG at [place]  Rule out L2 and L 3 radiculopathy with needle exam. Lateral femoral cutaneous neuropathy.   Dr Martinez is happy to review results and comment via my chart.     Try over the counter lidocaine cream for leg as needed. Over the counter 4% https://www.[employer]/Lidocaine-Relieving-Topical-Anesthetic-Compare/dp/T55SR933CZ    Also try compound gabapentin up to three times a day in right thigh for pain.

## 2023-07-07 NOTE — PROGRESS NOTES
Nevin Alvarado MRN# [id number]   Age: 32 year old YOB: 1990     Requesting physician: Latonya Limon            Assessment and Plan:   (G57.11) Meralgia paresthetica of right side  Comment: Nevin clearly has a very complex past medical history and I think switching between health systems and different neurologists is likely not helpful nor the best care for her.  She has a longstanding relationship with [facility] and I encouraged her to continue there.  She reports that her provider has switched locations and it is difficult for her to see him but they are not allowing her to switch.  It seems unfortunate and I would recommend her primary care physician advocate for her to be able to switch providers but stay with the same clinic for continuity.  On my review she has numerous ER visits it would be ideal if she could stay out of the ER and not get specialty consultations from the ED.    I agree with Dr. Cleveland's evaluation from May 23, 2023 that the patient likely has right-sided meralgia paresthetica causing her current right thigh complaints.  This most likely is related to her body habitus as well as diabetic diagnosis.    The patient could use compounded gabapentin cream as well as over-the-counter lidocaine for pain control.  This likely would be better for her than taking additional oral medications.    I do notes that in the past she has been found to have a low vitamin B-12 level.  Vitamin B12 supplementation may help her recover but I will defer this to her primary care physician.    The patient asked me to do a wheelchair evaluation I have deferred this to her physical medicine and rehabilitation doctor.    I do not think the patient needs to be seen at the [facility] neurology clinic and she should continue with her current neurology team I have nothing further to add.  Her complex diagnosis of CIDP appears stable but someone  "who knows the full history and has access to the full set of records would be better to comment on this.  It sounds as though she is having an EMG next week at [place] and it would do them well to compare to her past EMGs from [facility].  I would be happy to review.  I did provide the records back to her and did not keep a copy because she needs them for the [place] appointment.    Plan:   1. Return to [facility] for neurology care, keep consistent health system and providers.   2. Gabapentin compound cream ordered for discomfort, can also use lidocaine cream  3. Discuss with PCP B12 supplementation for goal B12 level over 400.    I spent 85 minutes caring for the patient on the day of her visit here that included record review, meeting with the patient and documentation.    Becca Martinez MD           History of Present Illness:     chief complaint:   Chief Complaint   Patient presents with     Consult        Nevin Alvarado is a 32 year old patient presenting for assessment of \"acute low back pain.\"  This was ordered by Dr. Zimmer at an emergency department visit on the [facility] on May 28, 2023.  5 days prior to that the patient had been seen in the emergency department as well as admitted overnight to the neurology service for similar symptoms.  The patient was diagnosed with meralgia paresthetica and advised to follow-up with her outpatient established neurologist, Dr. Arias, at [facility].    The patient has a very complex history and brings with her over 300 pages of outside records from [facility].  Essentially the patient has been worked up for an abnormal MRI scan that showed lumbosacral root thickening and an EMG that was consistent with a peripheral neuropathy.  She held the diagnosis of CIDP and was treated with IVIG intermittently.  She reports those infusions stopped 3 years ago.  She has not had advancement of symptoms but reports she is not back to normal.  She describes numbness in her lower extremities " as well as fatigue and needing to use a walker.  More recently she is at she been using a wheelchair but that is more to manage pain.    She reports that over the past 2 months she has developed an electric shocklike pain in the front of the right thigh.  It starts in the groin region and radiates down the front of the thigh.  It is triggered by movement.  In between the pain she has numbness in that area.  She also has residual numbness that stable in the lower extremities to the ankles.    In addition to this numbness the patient has some low back pain that has been diagnosed as sacroiliac joint pain and on Wednesday she had a steroid injection by Dr. Delgado, [facility].  She is awaiting to see if that is helpful.    The patient reports she is struggling with her weight.  She has a diagnosis of diabetes in her diet better control is good most recent hemoglobin A1c was 6.3.  She was started on Rybelsus and initially dropped 10 pounds in the first month and now is gaining weight.  She has been taking that medicine since January.  She reports that her primary care doctor will not give her an injectable therapy due to history of past self-harm.  She has a diagnosis of depression and history of suicidal and self-harm gestures.  She is actively working with a psychiatrist and feels as though her mental health is in a good and stable place at this point in time.  She is actually working on taking away her psychiatric medications to see if it helps clear her head and feel better.  She sounds very hopeful about this.    The patient lives in a group home and uses a walker most of the time.  More recently she has been using a wheelchair that she got from the [employer].  She is asking for wheelchair assessment.  She is actively seeing a physical medicine and rehabilitation doctor, Dr. Jung, at [facility] and I referred that she go back to him for assessment.    She is on Lyrica 100 mg 3 times a day to help  manage pain.    On review of the patient's chart the patient has had multiple emergency department visits in the past several months including 5/15/2023, 5/22/2023, 5/28/2023, 6/10/2023, 6/11/2023 and 6/27/2023.  Patient is seen in multiple health systems primary care comes from [facility].           Physical Exam:     BP (!) 144/73   Pulse 80   Resp 16   SpO2 100%   General Appearance:  Well-groomed and cooperative with examination.  Neurological Examination:  The patient is awake and alert.  She is oriented.  There is no aphasia or dysarthria.  Cranial nerves II through XII are intact  General Motor Survey: Due to body habitus I cannot assess muscle bulk.  Tone appears normal in all 4 extremities.  Strength testing is difficult due to a tendency towards giveaway however it appears normal except in the left lower extremity where she has weakness with great toe extension and dorsiflexion of the foot that cannot be distracted.  The weakness in the right foot with dorsiflexion can be distracted away and appears normal.  Hip flexor weakness also can be distracted and appears normal.  Reflexes: Absent reflexes  Coordination: Normal coordination of finger to nose and heel-knee-shin.   Gait: Uses a walker.  Leans heavily on the walker but is stable.  Sensory:   Absent vibration in right toes, ankles and knee  Pinprick reduced in stocking distribution just above ankle bilat, also in lateral femoral nerve location in right thigh  Light touch normal except right thigh  Temp same as pinprick           Pertinent history/data     The patient's mother has a history of neuropathy but it is not as severe as hers.  The patient reports that she sees 2 different physicians at [facility].  She sees Dr. Arias for the neuropathy but he has moved locations and it is difficult for her to see him.  In addition she sees Dr. Gregorio for her sleep needs.  He is the person prescribing Lyrica.    The patient has a history of MRI  imaging that shows cauda equina thickening.  This has been stable for more than 5 years with reports from [facility].  It sounds as though the most recent EMG was performed in 2018 at [facility] at that time it showed a sensorimotor peripheral neuropathy.  I believe the earliest EMG was from 2011 and also showed a peripheral neuropathy at that time.    The patient was also seen at [facility] neurology on August 11, 2015 by Dr. Carlin.  At that time the patient's complaints were headaches, abnormal MRI and longstanding issues with the lower extremities.  She had complaints of dizziness or syncope at that time.  Work-up was syncope versus nonepileptic events.  EEG was reportedly normal.  Stable white matter changes on MRI scan of the brain were found.  At that time the physician noted distal weakness involving the left lower extremity that sounds as though has been present since middle school.    The patient reports that she is getting fitted for an AFO on the left side for foot drop.    In addition the patient has had 2 emergency department consults by her stroke team the first on January 10, 2021 and the second on January 15, 2023.  Both were considered false alarms.    Dr. Thurston saw the patient on January 3, 2015 as a hospital consultation at [facility] in the [facility].  He diagnosed her with migraine with a posttraumatic component.  Recommended use of Maxalt.  At the time the patient was using tramadol, Fioricet and pseudoephedrine.  Also recommended treatment for depression and anxiety.    Component      Latest Ref Rng 6/27/2023  10:51 PM 6/27/2023  10:52 PM   WBC      4.0 - 11.0 10e3/uL  8.5    RBC Count      3.80 - 5.20 10e6/uL  4.13    Hemoglobin      11.7 - 15.7 g/dL  12.2    Hematocrit      35.0 - 47.0 %  37.0    MCV      78 - 100 fL  90    MCH      26.5 - 33.0 pg  29.5    MCHC      31.5 - 36.5 g/dL  33.0    RDW      10.0 - 15.0 %  13.5    Platelet Count      150 - 450 10e3/uL  278    %  Neutrophils      %  64    % Lymphocytes      %  23    % Monocytes      %  8    % Eosinophils      %  3    % Basophils      %  1    % Immature Granulocytes      %  1    NRBCs per 100 WBC      <1 /100  0    Absolute Neutrophils      1.6 - 8.3 10e3/uL  5.6    Absolute Lymphocytes      0.8 - 5.3 10e3/uL  1.9    Absolute Monocytes      0.0 - 1.3 10e3/uL  0.7    Absolute Eosinophils      0.0 - 0.7 10e3/uL  0.3    Absolute Basophils      0.0 - 0.2 10e3/uL  0.0    Absolute Immature Granulocytes      <=0.4 10e3/uL  0.0    Absolute NRBCs      10e3/uL  0.0    Sodium      136 - 145 mmol/L  138    Potassium      3.4 - 5.3 mmol/L  3.8    Chloride      98 - 107 mmol/L  102    Carbon Dioxide (CO2)      22 - 29 mmol/L  27    Anion Gap      7 - 15 mmol/L  9    Urea Nitrogen      6.0 - 20.0 mg/dL  11.0    Creatinine      0.51 - 0.95 mg/dL  0.63    Calcium      8.6 - 10.0 mg/dL  9.4    Glucose      70 - 99 mg/dL  129 (H)    GFR Estimate      >60 mL/min/1.73m2  >90    HCG Qualitative POCT      Negative, Indeterminate  Negative     TSH      0.30 - 4.20 uIU/mL  4.47 (H)    N-Terminal Pro Bnp      0 - 450 pg/mL  39    T4 Free      0.90 - 1.70 ng/dL  1.17       Legend:  (H) High    6/16/23 HbA1c 6.3  2/11/22 B12 203  8/20/12 SPEP normal

## 2023-07-07 NOTE — LETTER
7/7/2023       RE: Nevin Alvarado  [address] [facility] [address]     Dear Colleague,    Thank you for referring your patient, Nevin Alvarado, to the [facility] [place] at [facility]. Please see a copy of my visit note below.            Nevin Alvarado MRN# [id number]   Age: 32 year old YOB: 1990     Requesting physician: Latonya Limon            Assessment and Plan:   (G57.11) Meralgia paresthetica of right side  Comment: Nevin clearly has a very complex past medical history and I think switching between health systems and different neurologists is likely not helpful nor the best care for her.  She has a longstanding relationship with [facility] and I encouraged her to continue there.  She reports that her provider has switched locations and it is difficult for her to see him but they are not allowing her to switch.  It seems unfortunate and I would recommend her primary care physician advocate for her to be able to switch providers but stay with the same clinic for continuity.  On my review she has numerous ER visits it would be ideal if she could stay out of the ER and not get specialty consultations from the ED.    I agree with Dr. Cleveland's evaluation from May 23, 2023 that the patient likely has right-sided meralgia paresthetica causing her current right thigh complaints.  This most likely is related to her body habitus as well as diabetic diagnosis.    The patient could use compounded gabapentin cream as well as over-the-counter lidocaine for pain control.  This likely would be better for her than taking additional oral medications.    I do notes that in the past she has been found to have a low vitamin B-12 level.  Vitamin B12 supplementation may help her recover but I will defer this to her primary care physician.    The patient asked me to do  "a wheelchair evaluation I have deferred this to her physical medicine and rehabilitation doctor.    I do not think the patient needs to be seen at the [facility] neurology clinic and she should continue with her current neurology team I have nothing further to add.  Her complex diagnosis of CIDP appears stable but someone who knows the full history and has access to the full set of records would be better to comment on this.  It sounds as though she is having an EMG next week at [place] and it would do them well to compare to her past EMGs from [facility].  I would be happy to review.  I did provide the records back to her and did not keep a copy because she needs them for the [place] appointment.    Plan:   1. Return to [facility] for neurology care, keep consistent health system and providers.   2. Gabapentin compound cream ordered for discomfort, can also use lidocaine cream  3. Discuss with PCP B12 supplementation for goal B12 level over 400.    I spent 85 minutes caring for the patient on the day of her visit here that included record review, meeting with the patient and documentation.    Becca Martinez MD           History of Present Illness:     chief complaint:   Chief Complaint   Patient presents with    Consult        Nevin Alvarado is a 32 year old patient presenting for assessment of \"acute low back pain.\"  This was ordered by Dr. Zimmer at an emergency department visit on the [facility] on May 28, 2023.  5 days prior to that the patient had been seen in the emergency department as well as admitted overnight to the neurology service for similar symptoms.  The patient was diagnosed with meralgia paresthetica and advised to follow-up with her outpatient established neurologist, Dr. Arias, at [facility].    The patient has a very complex history and brings with her over 300 pages of outside records from [facility].  Essentially the patient has been worked up for an abnormal MRI scan that showed " lumbosacral root thickening and an EMG that was consistent with a peripheral neuropathy.  She held the diagnosis of CIDP and was treated with IVIG intermittently.  She reports those infusions stopped 3 years ago.  She has not had advancement of symptoms but reports she is not back to normal.  She describes numbness in her lower extremities as well as fatigue and needing to use a walker.  More recently she is at she been using a wheelchair but that is more to manage pain.    She reports that over the past 2 months she has developed an electric shocklike pain in the front of the right thigh.  It starts in the groin region and radiates down the front of the thigh.  It is triggered by movement.  In between the pain she has numbness in that area.  She also has residual numbness that stable in the lower extremities to the ankles.    In addition to this numbness the patient has some low back pain that has been diagnosed as sacroiliac joint pain and on Wednesday she had a steroid injection by Dr. Delgado, [facility].  She is awaiting to see if that is helpful.    The patient reports she is struggling with her weight.  She has a diagnosis of diabetes in her diet better control is good most recent hemoglobin A1c was 6.3.  She was started on Rybelsus and initially dropped 10 pounds in the first month and now is gaining weight.  She has been taking that medicine since January.  She reports that her primary care doctor will not give her an injectable therapy due to history of past self-harm.  She has a diagnosis of depression and history of suicidal and self-harm gestures.  She is actively working with a psychiatrist and feels as though her mental health is in a good and stable place at this point in time.  She is actually working on taking away her psychiatric medications to see if it helps clear her head and feel better.  She sounds very hopeful about this.    The patient lives in a group home and uses a walker most of  the time.  More recently she has been using a wheelchair that she got from the [facility].  She is asking for wheelchair assessment.  She is actively seeing a physical medicine and rehabilitation doctor, Dr. Jung, at [facility] and I referred that she go back to him for assessment.    She is on Lyrica 100 mg 3 times a day to help manage pain.    On review of the patient's chart the patient has had multiple emergency department visits in the past several months including 5/15/2023, 5/22/2023, 5/28/2023, 6/10/2023, 6/11/2023 and 6/27/2023.  Patient is seen in multiple health systems primary care comes from [facility].           Physical Exam:     BP (!) 144/73   Pulse 80   Resp 16   SpO2 100%   General Appearance:  Well-groomed and cooperative with examination.  Neurological Examination:  The patient is awake and alert.  She is oriented.  There is no aphasia or dysarthria.  Cranial nerves II through XII are intact  General Motor Survey: Due to body habitus I cannot assess muscle bulk.  Tone appears normal in all 4 extremities.  Strength testing is difficult due to a tendency towards giveaway however it appears normal except in the left lower extremity where she has weakness with great toe extension and dorsiflexion of the foot that cannot be distracted.  The weakness in the right foot with dorsiflexion can be distracted away and appears normal.  Hip flexor weakness also can be distracted and appears normal.  Reflexes: Absent reflexes  Coordination: Normal coordination of finger to nose and heel-knee-shin.   Gait: Uses a walker.  Leans heavily on the walker but is stable.  Sensory:   Absent vibration in right toes, ankles and knee  Pinprick reduced in stocking distribution just above ankle bilat, also in lateral femoral nerve location in right thigh  Light touch normal except right thigh  Temp same as pinprick           Pertinent history/data     The patient's mother has a history of neuropathy but it is not as  severe as hers.  The patient reports that she sees 2 different physicians at [facility] neurological clinic.  She sees Dr. Arias for the neuropathy but he has moved locations and it is difficult for her to see him.  In addition she sees Dr. Gregorio for her sleep needs.  He is the person prescribing Lyrica.    The patient has a history of MRI imaging that shows cauda equina thickening.  This has been stable for more than 5 years with reports from [facility].  It sounds as though the most recent EMG was performed in 2018 at [facility] at that time it showed a sensorimotor peripheral neuropathy.  I believe the earliest EMG was from 2011 and also showed a peripheral neuropathy at that time.    The patient was also seen at [facility] neurology on August 11, 2015 by Dr. Carlin.  At that time the patient's complaints were headaches, abnormal MRI and longstanding issues with the lower extremities.  She had complaints of dizziness or syncope at that time.  Work-up was syncope versus nonepileptic events.  EEG was reportedly normal.  Stable white matter changes on MRI scan of the brain were found.  At that time the physician noted distal weakness involving the left lower extremity that sounds as though has been present since middle school.    The patient reports that she is getting fitted for an AFO on the left side for foot drop.    In addition the patient has had 2 emergency department consults by her stroke team the first on January 10, 2021 and the second on January 15, 2023.  Both were considered false alarms.    Dr. Thurston saw the patient on January 3, 2015 as a hospital consultation at [facility] in the [facility].  He diagnosed her with migraine with a posttraumatic component.  Recommended use of Maxalt.  At the time the patient was using tramadol, Fioricet and pseudoephedrine.  Also recommended treatment for depression and anxiety.    Component      Latest Ref Rng 6/27/2023  10:51 PM 6/27/2023  10:52 PM   WBC       4.0 - 11.0 10e3/uL  8.5    RBC Count      3.80 - 5.20 10e6/uL  4.13    Hemoglobin      11.7 - 15.7 g/dL  12.2    Hematocrit      35.0 - 47.0 %  37.0    MCV      78 - 100 fL  90    MCH      26.5 - 33.0 pg  29.5    MCHC      31.5 - 36.5 g/dL  33.0    RDW      10.0 - 15.0 %  13.5    Platelet Count      150 - 450 10e3/uL  278    % Neutrophils      %  64    % Lymphocytes      %  23    % Monocytes      %  8    % Eosinophils      %  3    % Basophils      %  1    % Immature Granulocytes      %  1    NRBCs per 100 WBC      <1 /100  0    Absolute Neutrophils      1.6 - 8.3 10e3/uL  5.6    Absolute Lymphocytes      0.8 - 5.3 10e3/uL  1.9    Absolute Monocytes      0.0 - 1.3 10e3/uL  0.7    Absolute Eosinophils      0.0 - 0.7 10e3/uL  0.3    Absolute Basophils      0.0 - 0.2 10e3/uL  0.0    Absolute Immature Granulocytes      <=0.4 10e3/uL  0.0    Absolute NRBCs      10e3/uL  0.0    Sodium      136 - 145 mmol/L  138    Potassium      3.4 - 5.3 mmol/L  3.8    Chloride      98 - 107 mmol/L  102    Carbon Dioxide (CO2)      22 - 29 mmol/L  27    Anion Gap      7 - 15 mmol/L  9    Urea Nitrogen      6.0 - 20.0 mg/dL  11.0    Creatinine      0.51 - 0.95 mg/dL  0.63    Calcium      8.6 - 10.0 mg/dL  9.4    Glucose      70 - 99 mg/dL  129 (H)    GFR Estimate      >60 mL/min/1.73m2  >90    HCG Qualitative POCT      Negative, Indeterminate  Negative     TSH      0.30 - 4.20 uIU/mL  4.47 (H)    N-Terminal Pro Bnp      0 - 450 pg/mL  39    T4 Free      0.90 - 1.70 ng/dL  1.17       Legend:  (H) High    6/16/23 HbA1c 6.3  2/11/22 B12 203  8/20/12 SPEP normal          Again, thank you for allowing me to participate in the care of your patient.      Sincerely,    Becca Martinez MD

## 2023-07-08 ENCOUNTER — APPOINTMENT (OUTPATIENT)
Dept: CT IMAGING | Facility: CLINIC | Age: 33
End: 2023-07-08
Attending: EMERGENCY MEDICINE
Payer: COMMERCIAL

## 2023-07-08 ENCOUNTER — HOSPITAL ENCOUNTER (EMERGENCY)
Facility: CLINIC | Age: 33
Discharge: HOME | End: 2023-07-08
Attending: EMERGENCY MEDICINE | Admitting: EMERGENCY MEDICINE
Payer: COMMERCIAL

## 2023-07-08 VITALS
DIASTOLIC BLOOD PRESSURE: 88 MMHG | RESPIRATION RATE: 22 BRPM | TEMPERATURE: 97.3 F | OXYGEN SATURATION: 98 % | HEART RATE: 90 BPM | SYSTOLIC BLOOD PRESSURE: 170 MMHG

## 2023-07-08 DIAGNOSIS — R07.89 CHEST WALL PAIN: ICD-10-CM

## 2023-07-08 LAB
ANION GAP SERPL CALCULATED.3IONS-SCNC: 12 MMOL/L (ref 5–18)
BASOPHILS # BLD AUTO: 0 10E3/UL (ref 0–0.2)
BASOPHILS NFR BLD AUTO: 0 %
BUN SERPL-MCNC: 19 MG/DL (ref 8–22)
CALCIUM SERPL-MCNC: 9.3 MG/DL (ref 8.5–10.5)
CHLORIDE BLD-SCNC: 103 MMOL/L (ref 98–107)
CO2 SERPL-SCNC: 27 MMOL/L (ref 22–31)
CREAT SERPL-MCNC: 0.7 MG/DL (ref 0.6–1.1)
EOSINOPHIL # BLD AUTO: 0.1 10E3/UL (ref 0–0.7)
EOSINOPHIL NFR BLD AUTO: 1 %
ERYTHROCYTE [DISTWIDTH] IN BLOOD BY AUTOMATED COUNT: 13.7 % (ref 10–15)
GFR SERPL CREATININE-BSD FRML MDRD: >90 ML/MIN/1.73M2
GLUCOSE BLD-MCNC: 143 MG/DL (ref 70–125)
HCG SERPL QL: NEGATIVE
HCT VFR BLD AUTO: 38.5 % (ref 35–47)
HGB BLD-MCNC: 12.6 G/DL (ref 11.7–15.7)
IMM GRANULOCYTES # BLD: 0.1 10E3/UL
IMM GRANULOCYTES NFR BLD: 1 %
LYMPHOCYTES # BLD AUTO: 2.6 10E3/UL (ref 0.8–5.3)
LYMPHOCYTES NFR BLD AUTO: 19 %
MCH RBC QN AUTO: 29.2 PG (ref 26.5–33)
MCHC RBC AUTO-ENTMCNC: 32.7 G/DL (ref 31.5–36.5)
MCV RBC AUTO: 89 FL (ref 78–100)
MONOCYTES # BLD AUTO: 0.9 10E3/UL (ref 0–1.3)
MONOCYTES NFR BLD AUTO: 7 %
NEUTROPHILS # BLD AUTO: 9.7 10E3/UL (ref 1.6–8.3)
NEUTROPHILS NFR BLD AUTO: 72 %
NRBC # BLD AUTO: 0 10E3/UL
NRBC BLD AUTO-RTO: 0 /100
PLATELET # BLD AUTO: 260 10E3/UL (ref 150–450)
POTASSIUM BLD-SCNC: 4.1 MMOL/L (ref 3.5–5)
RBC # BLD AUTO: 4.31 10E6/UL (ref 3.8–5.2)
SODIUM SERPL-SCNC: 142 MMOL/L (ref 136–145)
TROPONIN I SERPL-MCNC: <0.01 NG/ML (ref 0–0.29)
WBC # BLD AUTO: 13.4 10E3/UL (ref 4–11)

## 2023-07-08 PROCEDURE — 250N000013 HC RX MED GY IP 250 OP 250 PS 637: Performed by: EMERGENCY MEDICINE

## 2023-07-08 PROCEDURE — 258N000003 HC RX IP 258 OP 636: Performed by: EMERGENCY MEDICINE

## 2023-07-08 PROCEDURE — 84703 CHORIONIC GONADOTROPIN ASSAY: CPT | Performed by: EMERGENCY MEDICINE

## 2023-07-08 PROCEDURE — 250N000011 HC RX IP 250 OP 636: Performed by: EMERGENCY MEDICINE

## 2023-07-08 PROCEDURE — 71275 CT ANGIOGRAPHY CHEST: CPT

## 2023-07-08 PROCEDURE — 96374 THER/PROPH/DIAG INJ IV PUSH: CPT | Mod: 59

## 2023-07-08 PROCEDURE — 84484 ASSAY OF TROPONIN QUANT: CPT | Performed by: EMERGENCY MEDICINE

## 2023-07-08 PROCEDURE — 99285 EMERGENCY DEPT VISIT HI MDM: CPT | Mod: 25

## 2023-07-08 PROCEDURE — 85025 COMPLETE CBC W/AUTO DIFF WBC: CPT | Performed by: EMERGENCY MEDICINE

## 2023-07-08 PROCEDURE — 96361 HYDRATE IV INFUSION ADD-ON: CPT

## 2023-07-08 PROCEDURE — 93005 ELECTROCARDIOGRAM TRACING: CPT | Performed by: EMERGENCY MEDICINE

## 2023-07-08 PROCEDURE — 80048 BASIC METABOLIC PNL TOTAL CA: CPT | Performed by: EMERGENCY MEDICINE

## 2023-07-08 PROCEDURE — 250N000011 HC RX IP 250 OP 636: Mod: JZ | Performed by: EMERGENCY MEDICINE

## 2023-07-08 PROCEDURE — 36415 COLL VENOUS BLD VENIPUNCTURE: CPT | Performed by: EMERGENCY MEDICINE

## 2023-07-08 RX ORDER — DIPHENHYDRAMINE HCL 25 MG
25 CAPSULE ORAL ONCE
Status: COMPLETED | OUTPATIENT
Start: 2023-07-08 | End: 2023-07-08

## 2023-07-08 RX ORDER — KETOROLAC TROMETHAMINE 15 MG/ML
15 INJECTION, SOLUTION INTRAMUSCULAR; INTRAVENOUS ONCE
Status: COMPLETED | OUTPATIENT
Start: 2023-07-08 | End: 2023-07-08

## 2023-07-08 RX ORDER — IOPAMIDOL 755 MG/ML
100 INJECTION, SOLUTION INTRAVASCULAR ONCE
Status: COMPLETED | OUTPATIENT
Start: 2023-07-08 | End: 2023-07-08

## 2023-07-08 RX ADMIN — KETOROLAC TROMETHAMINE 15 MG: 15 INJECTION, SOLUTION INTRAMUSCULAR; INTRAVENOUS at 21:15

## 2023-07-08 RX ADMIN — IOPAMIDOL 100 ML: 755 INJECTION, SOLUTION INTRAVENOUS at 21:52

## 2023-07-08 RX ADMIN — SODIUM CHLORIDE 1000 ML: 9 INJECTION, SOLUTION INTRAVENOUS at 21:16

## 2023-07-08 RX ADMIN — DIPHENHYDRAMINE HYDROCHLORIDE 25 MG: 25 CAPSULE ORAL at 22:33

## 2023-07-08 ASSESSMENT — ENCOUNTER SYMPTOMS
CHILLS: 0
SHORTNESS OF BREATH: 1
DYSURIA: 0
FEVER: 0
FREQUENCY: 0
HEMATURIA: 0
ABDOMINAL PAIN: 0
DIARRHEA: 1

## 2023-07-08 ASSESSMENT — ACTIVITIES OF DAILY LIVING (ADL): ADLS_ACUITY_SCORE: 40

## 2023-07-09 LAB
ATRIAL RATE - MUSE: 88 BPM
DIASTOLIC BLOOD PRESSURE - MUSE: NORMAL MMHG
INTERPRETATION ECG - MUSE: NORMAL
P AXIS - MUSE: 48 DEGREES
PR INTERVAL - MUSE: 152 MS
QRS DURATION - MUSE: 76 MS
QT - MUSE: 358 MS
QTC - MUSE: 433 MS
R AXIS - MUSE: 71 DEGREES
SYSTOLIC BLOOD PRESSURE - MUSE: NORMAL MMHG
T AXIS - MUSE: 25 DEGREES
VENTRICULAR RATE- MUSE: 88 BPM

## 2023-07-09 NOTE — ED PROVIDER NOTES
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 ANESTHESIA, REMOVAL OF RECTAL FOREIGN BODY;  Surgeon: Annmarie Haynes MD;  [facility]     HC REMOVE FECAL IMPACTION OR FB W ANESTHESIA N/A 12/18/2016    Procedure: REMOVE FECAL IMPACTION/FOREIGN BODY UNDER ANESTHESIA;  Surgeon: Soham Cano MD;  Location: [facility]     KNEE SURGERY Right      KNEE SURGERY - removed a small tissue mass from knee Right      LAPAROSCOPIC ABLATION ENDOMETRIOSIS       LAPAROTOMY EXPLORATORY N/A 1/10/2017    Procedure: LAPAROTOMY  EXPLORATORY;  Surgeon: Annmarie Haynes MD;  [facility]     LAPAROTOMY EXPLORATORY  09/11/2019    Manual manipulation of bowel to remove pill bottle in rectum     lymph nodes removed from neck; benign  age 7     MAMMOPLASTY REDUCTION Bilateral      OTHER SURGICAL HISTORY      foreign body anus removal     OH ESOPHAGOGASTRODUODENOSCOPY TRANSORAL DIAGNOSTIC N/A 1/5/2019    Procedure: ESOPHAGOGASTRODUODENOSCOPY (EGD) with foreign body removal using raptor;  Surgeon: Lila Sol MD;  Location: [facility];  Service: Gastroenterology     OH ESOPHAGOGASTRODUODENOSCOPY TRANSORAL DIAGNOSTIC N/A 1/25/2019    Procedure: ESOPHAGOGASTRODUODENOSCOPY (EGD) removal of foreign body;  Surgeon: Binu Vigil MD;  Location: [facility];  Service: Gastroenterology     OH ESOPHAGOGASTRODUODENOSCOPY TRANSORAL DIAGNOSTIC N/A 1/31/2019    Procedure: ESOPHAGOGASTRODUODENOSCOPY (EGD);  Surgeon: Siddharth Spears MD;  Location: [facility];  Service: Gastroenterology     OH ESOPHAGOGASTRODUODENOSCOPY TRANSORAL DIAGNOSTIC N/A 8/17/2019    Procedure: ESOPHAGOGASTRODUODENOSCOPY (EGD) with foreign body removal;  Surgeon: Darek Lucero MD;  Location: [facility];  Service: Gastroenterology     OH ESOPHAGOGASTRODUODENOSCOPY TRANSORAL DIAGNOSTIC N/A 9/29/2019    Procedure: ESOPHAGOGASTRODUODENOSCOPY (EGD) with foreign body removal;  Surgeon: Bailey Arnold MD;  Location: [facility];  Service: Gastroenterology     OH ESOPHAGOGASTRODUODENOSCOPY TRANSORAL DIAGNOSTIC N/A 10/3/2019    Procedure: ESOPHAGOGASTRODUODENOSCOPY (EGD), REMOVAL OF FOREIGN BODY;  Surgeon: Chris Lira MD;  Location: [facility];  Service: Gastroenterology     OH ESOPHAGOGASTRODUODENOSCOPY TRANSORAL DIAGNOSTIC N/A 10/6/2019    Procedure: ESOPHAGOGASTRODUODENOSCOPY (EGD) with attempted foreign body removal;  Surgeon: Felipe Connolly MD;  Location: [facility];  Service:  Gastroenterology     UT ESOPHAGOGASTRODUODENOSCOPY TRANSORAL DIAGNOSTIC N/A 12/15/2019    Procedure: ESOPHAGOGASTRODUODENOSCOPY (EGD) with foreign body removal;  Surgeon: Jeffy Zuñiga MD;  Location: [facility];  Service: Gastroenterology     UT ESOPHAGOGASTRODUODENOSCOPY TRANSORAL DIAGNOSTIC N/A 12/17/2019    Procedure: ESOPHAGOGASTRODUODENOSCOPY (EGD) with attempted foreign body removal;  Surgeon: Felipe Connolly MD;  Location: [facility];  Service: Gastroenterology     UT ESOPHAGOGASTRODUODENOSCOPY TRANSORAL DIAGNOSTIC N/A 12/21/2019    Procedure: ESOPHAGOGASTRODUODENOSCOPY (EGD) FOR FROEIGN BODY REMOVAL;  Surgeon: Binu Vigil MD;  Location: [facility];  Service: Gastroenterology     UT ESOPHAGOGASTRODUODENOSCOPY TRANSORAL DIAGNOSTIC N/A 7/22/2020    Procedure: ESOPHAGOGASTRODUODENOSCOPY (EGD);  Surgeon: Bailey Arnold MD;  Location: [facility];  Service: Gastroenterology     UT ESOPHAGOGASTRODUODENOSCOPY TRANSORAL DIAGNOSTIC N/A 8/14/2020    Procedure: ESOPHAGOGASTRODUODENOSCOPY (EGD) FOREIGN BODY REMOVAL;  Surgeon: Jeffy Zuñiga MD;  Location: [facility];  Service: Gastroenterology     UT ESOPHAGOGASTRODUODENOSCOPY TRANSORAL DIAGNOSTIC N/A 2/25/2021    Procedure: ESOPHAGOGASTRODUODENOSCOPY (EGD) with foreign body reoval;  Surgeon: Bird Sethi MD;  Location: [facility];  Service: Gastroenterology     UT ESOPHAGOGASTRODUODENOSCOPY TRANSORAL DIAGNOSTIC N/A 4/19/2021    Procedure: ESOPHAGOGASTRODUODENOSCOPY (EGD);  Surgeon: Libia Rose MD;  Location: [facility];  Service: Gastroenterology     UT SURG DIAGNOSTIC EXAM, ANORECTAL N/A 2/5/2020    Procedure: EXAM UNDER ANESTHESIA, Flexible Sigmoidoscopy, Retrieval of Foreign Body;  Surgeon: Sasha Ivan MD;  Location: [facility];  Service: General     RELEASE CARPAL TUNNEL Bilateral      RELEASE CARPAL TUNNEL Bilateral      REMOVAL, FOREIGN BODY, RECTUM N/A 7/21/2021     Procedure: MANUAL RETREIVALOF FOREIGN OBJECT- RECTUM.;  Surgeon: Aleksandra Gerber MD;  Location: [facility]     SIGMOIDOSCOPY FLEXIBLE N/A 1/10/2017    Procedure: SIGMOIDOSCOPY FLEXIBLE;  Surgeon: Annmarie Haynes MD;  [facility]     SIGMOIDOSCOPY FLEXIBLE N/A 5/8/2018    Procedure: SIGMOIDOSCOPY FLEXIBLE;  flex sig with foreign body removal using snare and rattooth forcep;  Surgeon: Soham Cano MD;  Location:  GI     SIGMOIDOSCOPY FLEXIBLE N/A 2/20/2019    Procedure: Exam under anesthesia Colonoscopy with attempted  removal of rectal foreign body;  Surgeon: Estrada Chávez MD;  Location: [facility]       ALLERGIES:  Allergies   Allergen Reactions     Amoxicillin-Pot Clavulanate Other (See Comments), Swelling and Rash     PN: facial swelling, left side. Also had cortisone injection the same day as she started the Augmentin.  Noted in downtime recovery of chart.    PN: facial swelling, left side. Also had cortisone injection the same day as she started the Augmentin.; HUT Comment: PN: facial swelling, left side. Also had cortisone injection the same day as she started the Augmentin.Noted in downtime recovery of chart.; HUT Reaction: Rash; HUT Reaction: Unknown; HUT Reaction Type: Allergy; HUT Severity: Med; HUT Noted: [id number]  PN: facial swelling, left side. Also had cortisone injection the same day as she started the Augmentin.  Other reaction(s): *Unknown  PN: facial swelling, left side. Also had cortisone injection the same day as she started the Augmentin.  Noted in downtime recovery of chart.  PN: facial swelling, left side. Also had cortisone injection the same day as she started the Augmentin.  Other reaction(s): Facial swelling  Other reaction(s): Facial swelling     Hydrocodone Nausea and Vomiting and GI Disturbance     vomiting for days, PN: vomiting for days; HUT Comment: vomiting for days; HUT Reaction: Gastrointestinal; HUT Reaction: Nausea And Vomiting; HUT Reaction Type:  Intolerance; HUT Severity: Med; HUT Noted: [id number]  vomiting for days    Other reaction(s): Rash     Hydrocodone-Acetaminophen Nausea and Vomiting and Rash     Update on 12/12  Pt says she can take tylenol just not the hydrocodone.   Other reaction(s): Rash       Influenza Vaccines Other (See Comments) and Nausea and Vomiting     HUT Reaction: Nausea And Vomiting; HUT Reaction Type: Intolerance; HUT Severity: Low; HUT Noted: [id number]     Latex Rash     HUT Reaction: Rash; HUT Reaction Type: Allergy; HUT Severity: Low; HUT Noted: [id number]  Other reaction(s): Rash       Oseltamivir Hives     med stopped, PN: med stopped  med stopped, PN: med stopped; HUT Comment: med stopped, PN: med stopped; HUT Reaction: Hives; HUT Reaction Type: Allergy; HUT Severity: Med; HUT Noted: [id number]     Penicillins Anaphylaxis     HUT Reaction: Anaphylaxis; HUT Reaction Type: Allergy; HUT Severity: High; HUT Noted: 20150904     Vancomycin Itching, Swelling and Rash     Other reaction(s): Redness  Flushed face, very itchy; HUT Comment: Flushed face, very itchy; HUT Reaction: Angioedema; HUT Reaction: Redness; HUT Severity: Med; HUT Noted: [id number]    facial     Blood-Group Specific Substance Other (See Comments)     Patient has an anti-Cw and non-specific antibodies. Blood product orders may be delayed. Draw one red top and two purple top tubes for all type/screen/crossmatch orders.  Patient has anti-Cw, anti-K (Angella), Warm auto and nonspecific antibodies. Blood products may be delayed. Draw patient 24 hours prior to transfusion. Draw one red top and two purple top tubes for all type and screen orders.     Clavulanic Acid Angioedema     Fentanyl Itching     Haemophilus B Polysaccharide Vaccine Nausea and Vomiting     Naltrexone Other (See Comments)     Reaction(s): Vivid dreams.     Other Drug Allergy (See Comments)      See original file MRN [id number]. Files are marked for merge     Oxycodone Swelling     Adhesive Tape Rash     Silicone  type  Silicone type    Other reaction(s): adhesive allergy  Other reaction(s): adhesive allergy  Silicone type    Other reaction(s): adhesive allergy       Band-Aid Anti-Itch      Other reaction(s): adhesive allergy     Cephalosporins Rash     Lamotrigine Rash     Possibly associated with Lamictal.   HUT Comment: Possibly associated with Lamictal. ; HUT Reaction: Rash; HUT Reaction Type: Allergy; HUT Severity: Low; HUT Noted: [id number]     No Clinical Screening - See Comments Rash and Other (See Comments)     Silicone type  Silicone type  See original file MRN [id number]. Files are marked for merge  History of swallowing sharp metallic objects. She should not be prescribed lancets due to posed risk of swallowing.        FAMILY HISTORY:  Family History   Problem Relation Age of Onset     Diabetes Type 2  Maternal Grandmother      Diabetes Type 2  Paternal Grandmother      Breast Cancer Paternal Grandmother      Cerebrovascular Disease Father 53     Myocardial Infarction No family hx of      Coronary Artery Disease Early Onset No family hx of      Ovarian Cancer No family hx of      Colon Cancer No family hx of      Depression Mother      Anxiety Disorder Mother        SOCIAL HISTORY:   Social History     Socioeconomic History     Marital status: Single   Occupational History     Occupation: On disability   Tobacco Use     Smoking status: Never     Smokeless tobacco: Never   Substance and Sexual Activity     Alcohol use: No     Alcohol/week: 0.0 standard drinks of alcohol     Drug use: No     Sexual activity: Not Currently     Partners: Male     Birth control/protection: I.U.D.     Comment: IUD - Mirena - placed July, 2015   Social History Narrative    Single.    Living in independent living portion of [employer].    On disability.    No regular exercise.        VITALS:  Patient Vitals for the past 24 hrs:   BP Temp Temp src Pulse Resp SpO2   07/08/23 2215 -- -- -- 90 -- 98 %   07/08/23 2040 (!) 170/88 97.3  F  (36.3  C) Temporal 91 22 98 %       PHYSICAL EXAM    Constitutional: Well developed, Well nourished, NAD  HENT: Normocephalic, Atraumatic, Bilateral external ears normal, Oropharynx normal, mucous membranes moist, Nose normal. Neck-  Normal range of motion, No tenderness, Supple, No stridor.   Eyes: PERRL, EOMI, Conjunctiva normal, No discharge.   Respiratory: Normal breath sounds, No respiratory distress, No wheezing, Speaks full sentences easily. No cough.   Cardiovascular: Normal heart rate, Regular rhythm, No murmurs Chest wall nontender.    GI:  Soft, No tenderness, No masses, No flank tenderness. No rebound or guarding.  : No cva tenderness    Musculoskeletal: 2+ DP pulses. No edema. No cyanosis. Good range of motion in all major joints. No tenderness to palpation. No tenderness of the CTLS spine.   Integument: Warm, Dry, No erythema, No rash. No petechiae.   Neurologic: Alert & oriented x 3, Normal motor function, Normal sensory function, No focal deficits noted.   Psychiatric: Affect normal, Judgment normal, Mood normal. Cooperative.     LAB:  All pertinent labs reviewed and interpreted.  Results for orders placed or performed during the hospital encounter of 07/08/23   CT Chest Pulmonary Embolism w Contrast    Impression    IMPRESSION:  No pulmonary embolus or other acute process in the chest.   Basic metabolic panel   Result Value Ref Range    Sodium 142 136 - 145 mmol/L    Potassium 4.1 3.5 - 5.0 mmol/L    Chloride 103 98 - 107 mmol/L    Carbon Dioxide (CO2) 27 22 - 31 mmol/L    Anion Gap 12 5 - 18 mmol/L    Urea Nitrogen 19 8 - 22 mg/dL    Creatinine 0.70 0.60 - 1.10 mg/dL    Calcium 9.3 8.5 - 10.5 mg/dL    Glucose 143 (H) 70 - 125 mg/dL    GFR Estimate >90 >60 mL/min/1.73m2   HCG qualitative Blood   Result Value Ref Range    hCG Serum Qualitative Negative Negative   Result Value Ref Range    Troponin I <0.01 0.00 - 0.29 ng/mL   CBC with platelets and differential   Result Value Ref Range    WBC Count  13.4 (H) 4.0 - 11.0 10e3/uL    RBC Count 4.31 3.80 - 5.20 10e6/uL    Hemoglobin 12.6 11.7 - 15.7 g/dL    Hematocrit 38.5 35.0 - 47.0 %    MCV 89 78 - 100 fL    MCH 29.2 26.5 - 33.0 pg    MCHC 32.7 31.5 - 36.5 g/dL    RDW 13.7 10.0 - 15.0 %    Platelet Count 260 150 - 450 10e3/uL    % Neutrophils 72 %    % Lymphocytes 19 %    % Monocytes 7 %    % Eosinophils 1 %    % Basophils 0 %    % Immature Granulocytes 1 %    NRBCs per 100 WBC 0 <1 /100    Absolute Neutrophils 9.7 (H) 1.6 - 8.3 10e3/uL    Absolute Lymphocytes 2.6 0.8 - 5.3 10e3/uL    Absolute Monocytes 0.9 0.0 - 1.3 10e3/uL    Absolute Eosinophils 0.1 0.0 - 0.7 10e3/uL    Absolute Basophils 0.0 0.0 - 0.2 10e3/uL    Absolute Immature Granulocytes 0.1 <=0.4 10e3/uL    Absolute NRBCs 0.0 10e3/uL   ECG 12-LEAD WITH MUSE (LHE)   Result Value Ref Range    Systolic Blood Pressure  mmHg    Diastolic Blood Pressure  mmHg    Ventricular Rate 88 BPM    Atrial Rate 88 BPM    NE Interval 152 ms    QRS Duration 76 ms     ms    QTc 433 ms    P Axis 48 degrees    R AXIS 71 degrees    T Axis 25 degrees    Interpretation ECG       Sinus rhythm  Cannot rule out Anterior infarct (cited on or before 15-KIKA-2023)  Abnormal ECG  When compared with ECG of 27-FEB-2023 18:40,  Nonspecific T wave abnormality, improved in Anterolateral leads       RADIOLOGY:  Reviewed all pertinent imaging. Please see official radiology report.  CT Chest Pulmonary Embolism w Contrast   Final Result   IMPRESSION:   No pulmonary embolus or other acute process in the chest.        EKG:    Performed at: 08-JUL-2023, 20:51    Impression: Sinus rhythm.  Nonischemic appearing ECG  Rate: 88 BPM  Rhythm: Sinus rhythm.  Axis: 71  NE Interval: 152 ms  QRS Interval: 76 ms  QTc Interval: 433 ms  Comparison: When compared with ECG of 27-FEB-2023 18:40, no significant changes appreciated.    I have independently reviewed and interpreted the EKG(s) documented above.    PROCEDURES:   None.     Dick MURRAY am  serving as a scribe to document services personally performed by Dr. Dye based on my observation and the provider's statements to me. I, Yuniel Dye MD attest that Dick Carvajal is acting in a scribe capacity, has observed my performance of the services and has documented them in accordance with my direction.    Yuniel Dye M.D.  Emergency Medicine  [facility] EMERGENCY ROOM  [facility] [facility] [address]  [phone]  Dept: [phone]         Yuniel Dye MD  07/08/23 4833

## 2023-07-09 NOTE — DISCHARGE INSTRUCTIONS
Overall your work-up was very reassuring.  There is no evidence of pneumonia or blood clot or heart abnormality at this time.  This is likely a strain of your chest wall.  No other concerning findings on your work-up in the emergency department.  Recommend over-the-counter pain management.  Please follow-up with your primary care doctor in the next couple of days for recheck and follow-up care.  If you have escalation your symptoms or develop distal concern return to emergency part repeat assessment.

## 2023-07-09 NOTE — ED TRIAGE NOTES
pt c/o right and left sided chest pain hurts worse when bending over, started a few hours ago while outside reading. Denies cardiac Hx. Pt states Hx of PE, is not currently on blood thinners, feeling short of breath.

## 2023-07-10 ENCOUNTER — VIRTUAL VISIT (OUTPATIENT)
Dept: GASTROENTEROLOGY | Facility: CLINIC | Age: 33
End: 2023-07-10
Attending: PHYSICIAN ASSISTANT
Payer: COMMERCIAL

## 2023-07-10 DIAGNOSIS — R13.19 ESOPHAGEAL DYSPHAGIA: ICD-10-CM

## 2023-07-10 DIAGNOSIS — T18.9XXS SWALLOWED FOREIGN BODY, SEQUELA: Primary | ICD-10-CM

## 2023-07-10 DIAGNOSIS — K90.49 DAIRY PRODUCT INTOLERANCE: ICD-10-CM

## 2023-07-10 DIAGNOSIS — K21.9 GASTROESOPHAGEAL REFLUX DISEASE, UNSPECIFIED WHETHER ESOPHAGITIS PRESENT: ICD-10-CM

## 2023-07-10 LAB
ANCA AB PATTERN SER IF-IMP: NORMAL
C-ANCA TITR SER IF: NORMAL {TITER}

## 2023-07-10 PROCEDURE — 99214 OFFICE O/P EST MOD 30 MIN: CPT | Mod: VID | Performed by: PHYSICIAN ASSISTANT

## 2023-07-10 ASSESSMENT — PAIN SCALES - GENERAL: PAINLEVEL: NO PAIN (0)

## 2023-07-10 NOTE — NURSING NOTE
Is the patient currently in the state of [place]? YES    Visit mode:VIDEO    If the visit is dropped, the patient can be reconnected by: VIDEO VISIT: Text to cell phone: [phone]    Will anyone else be joining the visit? NO      How would you like to obtain your AVS? MyChart    Are changes needed to the allergy or medication list? NO   Patient declined individual allergy and medication review by support staff because PT states everything is updated - no changes.    PT declined PHQ2 - states she needs to see the qnrs in order to complete.      Reason for visit: RECHECK      Jovon Madera

## 2023-07-10 NOTE — TELEPHONE ENCOUNTER
Per MD, d-dimer is elevated, so RN should place for LE doppler. Order placed and patient given number to call and schedule today or tomorrow, preferably.

## 2023-07-10 NOTE — PATIENT INSTRUCTIONS
It was a pleasure taking care of you today.  I've included a brief summary of our discussion and care plan from today's visit below.  Please review this information with your primary care provider.  _______________________________________________________________________    My recommendations are summarized as follows:    - Continue Omeprazole 40mg once daily 30-60 minutes before breakfast.   - Continued lifestyle modifications of chewing well, taking small bites and avoiding trigger foods as well as continued work with psychiatrist/behavioral health teams  - Evaluation with on staff dietitian for concerns of dairy intolerance with a limited budget    To schedule endoscopic procedures you may call: [phone]  To schedule radiology (imaging) tests you may call: [phone]  To schedule an ENT appointment you may call: [phone]    Please call my nurse Arianna ([phone])Roberta ([phone]) with any questions or concerns.      Return to GI Clinic in 3 months to review your progress.    _______________________________________________________________________    Who do I call with any questions after my visit?  Please be in touch if there are any further questions that arise following today's visit.  There are multiple ways to contact your gastroenterology care team.      During business hours, you may reach a Gastroenterology nurse at [phone] and choose option 3.       To schedule or reschedule an appointment, please call [phone].     You can always send a secure message through [employer].  [employer] messages are answered by your nurse or doctor typically within 24 hours.  Please allow extra time on weekends and holidays.      For urgent/emergent questions after business hours, you may reach the on-call GI Fellow by contacting the [facility] at [phone].     How will I get the results of any tests ordered?    You will receive all of your results.  If you have signed up for  MyChart, any tests ordered at your visit will be available to you after your physician reviews them.  Typically this takes 1-2 weeks.  If there are urgent results that require a change in your care plan, your physician or nurse will call you to discuss the next steps.      What is [employer]?  [employer] is a secure way for you to access all of your healthcare records from the [facility].  It is a web based computer program, so you can sign on to it from any location.  It also allows you to send secure messages to your care team.  I recommend signing up for [employer] access if you have not already done so and are comfortable with using a computer.      How to I schedule a follow-up visit?  If you did not schedule a follow-up visit today, please call [phone] to schedule a follow-up office visit.      If you feel you received exceptional care and are interested in supporting the clinical and research goals of Carli Potter PA-C or the Division of Gastroenterology, Hepatology, and Nutrition please contact [email or web address] from the [facility] to discuss opportunities to donate.    Sincerely,    Carli Potter PA-C  Division of Gastroenterology, Hepatology, and Nutrition  [facility]

## 2023-07-10 NOTE — PROGRESS NOTES
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 the esophageal motility was limited during evaluation secondary to patient's impaired mobility.  However, the esophagus was noted to be patulous with some evidence of dysmotility.    Most recently Nevin was seen by Dr. Simons 3/31/2023 for continued concerns of dysphonia/voice hoarseness.  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    Extensive scope history located within procedures tab. Most recent endoscopy 3/11/2023 for ingestion of zip tie.     #GERD   #Dysphagia   #Repatiative Foreign Body Ingestions   #Dairy Intolerance  Symptoms overall have been stable and continue to improve with continued psychiatric evaluation/behavioral health counseling, addition of lifestyle modifications and Omeprazole 40 mg daily. Symptoms of dysphagia are likely driven by reflux, repeat trauma from continued swallowing of foreign objects and complicated history of esophgeal perforation 2019. Intrinsic motility disorder of the esophagus remains on the differential however as symptoms are stable will defer additional evaluation at this time.      Future considerations could be to repeat EGD with empiric dilation, only to be performed in absence of a foreign body for possibly stricturing disease.     - Continue Omeprazole 40mg once daily 30-60 minutes before breakfast.   - Continued lifestyle modifications of  chewing well, taking small bites and avoiding trigger foods as well as continued work with psychiatrist/behavioral health teams  - Evaluation with on staff dietitian for concerns of dairy intolerance with a limited budget      Follow up plan:   Return to clinic 3 months and as needed.    The risks and benefits of my recommendations, as well as other treatment options were discussed with the patient and any available family today. All questions were answered.     o Follow up: As planned above. Today, I personally spent 19 minutes in direct face to face time with the patient, of which greater than 50% of the time was spent in patient education and counseling as described above. Approximately 13 minutes were spent on indirect care associated with the patient's consultation including but not limited to review of: patient medical records to date, clinic visits, hospital records, lab results, imaging studies, procedural documentation, and coordinating care with other providers. The findings from this review are summarized in the above note. All of the above accounted for a cumulative time of 32 minutes and was performed on the date of service.     The patient verbalized understanding of the plan and was appreciative for the time spent and information provided during the office visit.       Author:   Carli Potter PA-C  Division of Gastroenterology, Hepatology, and Nutrition  [facility]     Documentation assisted by voice recognition and documentation system.

## 2023-07-10 NOTE — LETTER
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    Extensive scope history located within procedures tab. Most recent endoscopy 3/11/2023 for ingestion of zip tie.     #GERD   #Dysphagia   #Repatiative Foreign Body Ingestions   #Dairy Intolerance  Symptoms overall have been stable and continue to improve with continued psychiatric evaluation/behavioral health counseling, addition of lifestyle modifications and Omeprazole 40 mg daily. Symptoms of dysphagia are likely driven by reflux, repeat trauma from continued swallowing of foreign objects and complicated history of esophgeal perforation 2019. Intrinsic motility disorder of the esophagus remains on the differential however as symptoms are stable will defer additional evaluation at this time.      Future considerations could be to repeat EGD with empiric dilation, only to be performed in absence of a foreign body for possibly stricturing disease.     - Continue Omeprazole 40mg once daily 30-60 minutes before breakfast.   - Continued lifestyle modifications of chewing well, taking small bites and avoiding trigger foods as well as continued work with psychiatrist/behavioral health teams  - Evaluation with on staff dietitian for concerns of dairy intolerance with a limited budget      Follow up plan:   Return to  clinic 3 months and as needed.    The risks and benefits of my recommendations, as well as other treatment options were discussed with the patient and any available family today. All questions were answered.     Follow up: As planned above. Today, I personally spent 19 minutes in direct face to face time with the patient, of which greater than 50% of the time was spent in patient education and counseling as described above. Approximately 13 minutes were spent on indirect care associated with the patient's consultation including but not limited to review of: patient medical records to date, clinic visits, hospital records, lab results, imaging studies, procedural documentation, and coordinating care with other providers. The findings from this review are summarized in the above note. All of the above accounted for a cumulative time of 32 minutes and was performed on the date of service.     The patient verbalized understanding of the plan and was appreciative for the time spent and information provided during the office visit.     Author:   Carli Potter PA-C  Division of Gastroenterology, Hepatology, and Nutrition  [facility]     Documentation assisted by voice recognition and documentation system.              Again, thank you for allowing me to participate in the care of your patient.      Sincerely,    Carli Potter PA-C

## 2023-07-11 ENCOUNTER — TELEPHONE (OUTPATIENT)
Dept: PULMONOLOGY | Facility: CLINIC | Age: 33
End: 2023-07-11
Payer: COMMERCIAL

## 2023-07-11 ENCOUNTER — HOSPITAL ENCOUNTER (OUTPATIENT)
Dept: ULTRASOUND IMAGING | Facility: CLINIC | Age: 33
Discharge: HOME | End: 2023-07-11
Attending: INTERNAL MEDICINE | Admitting: INTERNAL MEDICINE
Payer: COMMERCIAL

## 2023-07-11 DIAGNOSIS — R79.89 ELEVATED D-DIMER: ICD-10-CM

## 2023-07-11 PROCEDURE — 93970 EXTREMITY STUDY: CPT

## 2023-07-11 NOTE — TELEPHONE ENCOUNTER
Called patient to notify that bilateral venous doppler was negative and no clots were detected. Patient verbalized understanding and all questions were answered.

## 2023-07-13 ENCOUNTER — TELEPHONE (OUTPATIENT)
Dept: NEUROLOGY | Facility: CLINIC | Age: 33
End: 2023-07-13
Payer: COMMERCIAL

## 2023-07-13 NOTE — TELEPHONE ENCOUNTER
A prior authorization is needed for the following compounded medications prescribed.  Please complete a prior authorization with the information included below.         Medication:Gabapentin 8% ( sslo) pli cream ( compound )         Ingredients                                                                  NDCs                                                Quantities                     Gabapentin powd                                                  [address]-07                                          2.400  Ethoxy diglycol liqd                                                [address]-57                                          1.500  Saltstable lo crea                                                   [id number]                                          26.100              RX #:[id number]  Reason for Rejection: Non-matched product/service         Pharmacy Insurance plan:Medical dual  BIN #:[id number]  ID #:[id number]  PCN #:[id number]  Phone #:[phone]           Pharmacy NPI:[id number]           Please advise the Compounding Pharmacy @ [phone] when the prior authorization is approved or denied.         Thank you for your time.

## 2023-07-18 NOTE — TELEPHONE ENCOUNTER
Central Prior Authorization Team   Phone: [phone]      PA Initiation    Medication: COMPOUNDED NON-CONTROLLED (CMPD RX) - PHARMACY TO MIX COMPOUNDED MEDICATION  Insurance Company: [employer] - Phone [phone] Fax [phone]  [facility] [place], [place] - [address]  Filling Pharmacy Phone:    Filling Pharmacy Fax:    Start Date: 7/18/2023

## 2023-07-19 NOTE — TELEPHONE ENCOUNTER
Received a letter from [employer] stating the NDC for one of the ingredients is invalid. It is not. I sent this back to [employer] with a screenshot of the recipe showing it to be a valid NDC.

## 2023-07-20 ENCOUNTER — HOSPITAL ENCOUNTER (EMERGENCY)
Facility: CLINIC | Age: 33
Discharge: HOME | End: 2023-07-21
Attending: EMERGENCY MEDICINE | Admitting: EMERGENCY MEDICINE
Payer: COMMERCIAL

## 2023-07-20 ENCOUNTER — NURSE TRIAGE (OUTPATIENT)
Dept: NURSING | Facility: CLINIC | Age: 33
End: 2023-07-20
Payer: COMMERCIAL

## 2023-07-20 DIAGNOSIS — R07.89 ATYPICAL CHEST PAIN: ICD-10-CM

## 2023-07-20 LAB
ANION GAP SERPL CALCULATED.3IONS-SCNC: 9 MMOL/L (ref 7–15)
BASOPHILS # BLD AUTO: 0 10E3/UL (ref 0–0.2)
BASOPHILS NFR BLD AUTO: 0 %
BUN SERPL-MCNC: 15.2 MG/DL (ref 6–20)
CALCIUM SERPL-MCNC: 9.8 MG/DL (ref 8.6–10)
CHLORIDE SERPL-SCNC: 101 MMOL/L (ref 98–107)
CREAT SERPL-MCNC: 0.63 MG/DL (ref 0.51–0.95)
DEPRECATED HCO3 PLAS-SCNC: 28 MMOL/L (ref 22–29)
EOSINOPHIL # BLD AUTO: 0.3 10E3/UL (ref 0–0.7)
EOSINOPHIL NFR BLD AUTO: 3 %
ERYTHROCYTE [DISTWIDTH] IN BLOOD BY AUTOMATED COUNT: 13.5 % (ref 10–15)
GFR SERPL CREATININE-BSD FRML MDRD: >90 ML/MIN/1.73M2
GLUCOSE SERPL-MCNC: 122 MG/DL (ref 70–99)
HCT VFR BLD AUTO: 38.9 % (ref 35–47)
HGB BLD-MCNC: 12.8 G/DL (ref 11.7–15.7)
IMM GRANULOCYTES # BLD: 0 10E3/UL
IMM GRANULOCYTES NFR BLD: 0 %
LYMPHOCYTES # BLD AUTO: 2.2 10E3/UL (ref 0.8–5.3)
LYMPHOCYTES NFR BLD AUTO: 21 %
MCH RBC QN AUTO: 29.9 PG (ref 26.5–33)
MCHC RBC AUTO-ENTMCNC: 32.9 G/DL (ref 31.5–36.5)
MCV RBC AUTO: 91 FL (ref 78–100)
MONOCYTES # BLD AUTO: 0.7 10E3/UL (ref 0–1.3)
MONOCYTES NFR BLD AUTO: 7 %
NEUTROPHILS # BLD AUTO: 7.2 10E3/UL (ref 1.6–8.3)
NEUTROPHILS NFR BLD AUTO: 69 %
NRBC # BLD AUTO: 0 10E3/UL
NRBC BLD AUTO-RTO: 0 /100
PLATELET # BLD AUTO: 264 10E3/UL (ref 150–450)
POTASSIUM SERPL-SCNC: 3.7 MMOL/L (ref 3.4–5.3)
RBC # BLD AUTO: 4.28 10E6/UL (ref 3.8–5.2)
SODIUM SERPL-SCNC: 138 MMOL/L (ref 136–145)
TROPONIN T SERPL HS-MCNC: 10 NG/L
WBC # BLD AUTO: 10.5 10E3/UL (ref 4–11)

## 2023-07-20 PROCEDURE — 84484 ASSAY OF TROPONIN QUANT: CPT | Performed by: EMERGENCY MEDICINE

## 2023-07-20 PROCEDURE — 99283 EMERGENCY DEPT VISIT LOW MDM: CPT | Mod: 25 | Performed by: EMERGENCY MEDICINE

## 2023-07-20 PROCEDURE — 85025 COMPLETE CBC W/AUTO DIFF WBC: CPT | Performed by: EMERGENCY MEDICINE

## 2023-07-20 PROCEDURE — 99284 EMERGENCY DEPT VISIT MOD MDM: CPT | Performed by: EMERGENCY MEDICINE

## 2023-07-20 PROCEDURE — 80048 BASIC METABOLIC PNL TOTAL CA: CPT | Performed by: EMERGENCY MEDICINE

## 2023-07-20 PROCEDURE — 93010 ELECTROCARDIOGRAM REPORT: CPT | Performed by: EMERGENCY MEDICINE

## 2023-07-20 PROCEDURE — 93005 ELECTROCARDIOGRAM TRACING: CPT | Performed by: EMERGENCY MEDICINE

## 2023-07-20 PROCEDURE — 36415 COLL VENOUS BLD VENIPUNCTURE: CPT | Performed by: EMERGENCY MEDICINE

## 2023-07-20 ASSESSMENT — ACTIVITIES OF DAILY LIVING (ADL): ADLS_ACUITY_SCORE: 38

## 2023-07-20 NOTE — TELEPHONE ENCOUNTER
Per PA team, This is not eligible for a PA because pharmacy is using NDCs that are invalid. Pharmacy needs to work with insurance to find valid NDCs they can use and rerun their claim. You can close the encounter. Thank you.

## 2023-07-20 NOTE — TELEPHONE ENCOUNTER
Prior Authorization Not Needed per Insurance-NDCs for 2 of the compound ingredients are invalid, so insurance cannot review the compound    Medication: COMPOUNDED NON-CONTROLLED (CMPD RX) - PHARMACY TO MIX COMPOUNDED MEDICATION  Insurance Company: [employer] - Phone [phone] Fax [phone]  Expected CoPay:      [facility] [place], [place] - [address] [place] [address]  Pharmacy Notified: Yes-spoke to Mikala and faxed the rejection from insurance  Patient Notified:

## 2023-07-21 VITALS
BODY MASS INDEX: 53.92 KG/M2 | RESPIRATION RATE: 16 BRPM | TEMPERATURE: 98.2 F | HEIGHT: 62 IN | DIASTOLIC BLOOD PRESSURE: 82 MMHG | WEIGHT: 293 LBS | SYSTOLIC BLOOD PRESSURE: 139 MMHG | OXYGEN SATURATION: 100 % | HEART RATE: 101 BPM

## 2023-07-21 LAB
ATRIAL RATE - MUSE: 83 BPM
DIASTOLIC BLOOD PRESSURE - MUSE: NORMAL MMHG
INTERPRETATION ECG - MUSE: NORMAL
P AXIS - MUSE: 28 DEGREES
PR INTERVAL - MUSE: 160 MS
QRS DURATION - MUSE: 78 MS
QT - MUSE: 374 MS
QTC - MUSE: 439 MS
R AXIS - MUSE: 72 DEGREES
SYSTOLIC BLOOD PRESSURE - MUSE: NORMAL MMHG
T AXIS - MUSE: 20 DEGREES
VENTRICULAR RATE- MUSE: 83 BPM

## 2023-07-21 NOTE — DISCHARGE INSTRUCTIONS
Your EKG is normal  The chest x-ray you had 2 days ago at [facility] was normal except for mild cardiomegaly  The other blood test we did today are normal  Follow-up with your primary care provider

## 2023-07-21 NOTE — ED PROVIDER NOTES
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 Blood products may be delayed. Draw patient 24 hours prior to transfusion. Draw one red top and two purple top tubes for all type and screen orders.     Clavulanic Acid Angioedema     Fentanyl Itching      Haemophilus B Polysaccharide Vaccine Nausea and Vomiting     Naltrexone Other (See Comments)     Reaction(s): Vivid dreams.     Other Drug Allergy (See Comments)      See original file MRN [id number]. Files are marked for merge     Oxycodone Swelling     Adhesive Tape Rash     Silicone type  Silicone type    Other reaction(s): adhesive allergy  Other reaction(s): adhesive allergy  Silicone type    Other reaction(s): adhesive allergy       Band-Aid Anti-Itch      Other reaction(s): adhesive allergy     Cephalosporins Rash     Lamotrigine Rash     Possibly associated with Lamictal.   HUT Comment: Possibly associated with Lamictal. ; HUT Reaction: Rash; HUT Reaction Type: Allergy; HUT Severity: Low; HUT Noted: 20180307     No Clinical Screening - See Comments Rash and Other (See Comments)     Silicone type  Silicone type  See original file MRN [id number]. Files are marked for merge  History of swallowing sharp metallic objects. She should not be prescribed lancets due to posed risk of swallowing.      Family History  Family History   Problem Relation Age of Onset     Diabetes Type 2  Maternal Grandmother      Diabetes Type 2  Paternal Grandmother      Breast Cancer Paternal Grandmother      Cerebrovascular Disease Father 53     Myocardial Infarction No family hx of      Coronary Artery Disease Early Onset No family hx of      Ovarian Cancer No family hx of      Colon Cancer No family hx of      Depression Mother      Anxiety Disorder Mother      Social History   Social History     Tobacco Use     Smoking status: Never     Smokeless tobacco: Never   Substance Use Topics     Alcohol use: No     Alcohol/week: 0.0 standard drinks of alcohol     Drug use: No      Past medical history, past surgical history, medications, allergies, family history, and social history were reviewed with the patient. No additional pertinent items.      A complete review of systems was performed with pertinent positives and negatives noted in  "the HPI, and all other systems negative.    Physical Exam   BP: 133/80  Pulse: 88  Temp: 98.4  F (36.9  C)  Resp: 16  Height: 157.5 cm (5' 2\")  Weight: 138.3 kg (305 lb)  SpO2: 98 %  Physical Exam  Vitals and nursing note reviewed.   Constitutional:       General: She is not in acute distress.  Cardiovascular:      Heart sounds: Normal heart sounds. No murmur heard.  Pulmonary:      Effort: Pulmonary effort is normal.      Breath sounds: Normal breath sounds.   Neurological:      General: No focal deficit present.      Mental Status: She is alert and oriented to person, place, and time.   Psychiatric:         Mood and Affect: Mood normal.         Behavior: Behavior normal.           ED Course, Procedures, & Data     11:23 PM  The patient was seen and examined by Estrada Bee MD. in [facility]             EKG Interpretation:      Interpreted by Estrada Bee MD.   Time reviewed: 20:58:56  Symptoms at time of EKG: Chest pain.   Rhythm: Normal sinus   Rate: Normal  Comparison to prior: No old EKG available  Clinical Impression: normal EKG             Results for orders placed or performed during the hospital encounter of 07/20/23   Basic metabolic panel     Status: Abnormal   Result Value Ref Range    Sodium 138 136 - 145 mmol/L    Potassium 3.7 3.4 - 5.3 mmol/L    Chloride 101 98 - 107 mmol/L    Carbon Dioxide (CO2) 28 22 - 29 mmol/L    Anion Gap 9 7 - 15 mmol/L    Urea Nitrogen 15.2 6.0 - 20.0 mg/dL    Creatinine 0.63 0.51 - 0.95 mg/dL    Calcium 9.8 8.6 - 10.0 mg/dL    Glucose 122 (H) 70 - 99 mg/dL    GFR Estimate >90 >60 mL/min/1.73m2   Troponin T, High Sensitivity     Status: Normal   Result Value Ref Range    Troponin T, High Sensitivity 10 <=14 ng/L   CBC with platelets and differential     Status: None   Result Value Ref Range    WBC Count 10.5 4.0 - 11.0 10e3/uL    RBC Count 4.28 3.80 - 5.20 10e6/uL    Hemoglobin 12.8 11.7 - 15.7 g/dL    Hematocrit 38.9 35.0 - 47.0 %    MCV " 91 78 - 100 fL    MCH 29.9 26.5 - 33.0 pg    MCHC 32.9 31.5 - 36.5 g/dL    RDW 13.5 10.0 - 15.0 %    Platelet Count 264 150 - 450 10e3/uL    % Neutrophils 69 %    % Lymphocytes 21 %    % Monocytes 7 %    % Eosinophils 3 %    % Basophils 0 %    % Immature Granulocytes 0 %    NRBCs per 100 WBC 0 <1 /100    Absolute Neutrophils 7.2 1.6 - 8.3 10e3/uL    Absolute Lymphocytes 2.2 0.8 - 5.3 10e3/uL    Absolute Monocytes 0.7 0.0 - 1.3 10e3/uL    Absolute Eosinophils 0.3 0.0 - 0.7 10e3/uL    Absolute Basophils 0.0 0.0 - 0.2 10e3/uL    Absolute Immature Granulocytes 0.0 <=0.4 10e3/uL    Absolute NRBCs 0.0 10e3/uL   CBC with platelets differential     Status: None    Narrative    The following orders were created for panel order CBC with platelets differential.  Procedure                               Abnormality         Status                     ---------                               -----------         ------                     CBC with platelets and d...[[id number]]                      Final result                 Please view results for these tests on the individual orders.     Medications - No data to display  Labs Ordered and Resulted from Time of ED Arrival to Time of ED Departure   BASIC METABOLIC PANEL - Abnormal       Result Value    Sodium 138      Potassium 3.7      Chloride 101      Carbon Dioxide (CO2) 28      Anion Gap 9      Urea Nitrogen 15.2      Creatinine 0.63      Calcium 9.8      Glucose 122 (*)     GFR Estimate >90     TROPONIN T, HIGH SENSITIVITY - Normal    Troponin T, High Sensitivity 10     CBC WITH PLATELETS AND DIFFERENTIAL    WBC Count 10.5      RBC Count 4.28      Hemoglobin 12.8      Hematocrit 38.9      MCV 91      MCH 29.9      MCHC 32.9      RDW 13.5      Platelet Count 264      % Neutrophils 69      % Lymphocytes 21      % Monocytes 7      % Eosinophils 3      % Basophils 0      % Immature Granulocytes 0      NRBCs per 100 WBC 0      Absolute Neutrophils 7.2      Absolute Lymphocytes 2.2       Absolute Monocytes 0.7      Absolute Eosinophils 0.3      Absolute Basophils 0.0      Absolute Immature Granulocytes 0.0      Absolute NRBCs 0.0       No orders to display          Critical care was not performed.     Medical Decision Making  The patient's presentation was of low complexity (an acute and uncomplicated illness or injury).    The patient's evaluation involved:  ordering and/or review of 3+ test(s) in this encounter (CBC, basic metabolic, EKG, troponin)    The patient's management necessitated only low risk treatment.      Assessment & Plan    32-year-old female well-known to the emergency department comes in complaining of about 1 week of intermittent sharp chest pain she has had multiple recent investigations including 2 CTAs of the chest for pulmonary embolism.  There is no indication for additional imaging.  [facility] and had a chest x-ray done that was normal except for mild cardiomegaly.  Here she had an EKG that was normal routine labs are normal and a troponin that is negative.  No further investigation is indicated in this patient and she is discharged home.    I have reviewed the nursing notes. I have reviewed the findings, diagnosis, plan and need for follow up with the patient.    New Prescriptions    No medications on file       Final diagnoses:   Atypical chest pain   I, TONY BYNUM, am serving as a trained medical scribe to document services personally performed by Estrada Bee MD, based on the provider's statements to me.     IEstrada MD, was physically present and have reviewed and verified the accuracy of this note documented by TONY BYNUM.    Estrada Bee MD.   [facility] EMERGENCY DEPARTMENT  7/20/2023     Estrada Bee MD  07/21/23 0004

## 2023-07-21 NOTE — TELEPHONE ENCOUNTER
PA team's response:    This is something the pharmacy needs to work out with insurance--to find NDCs that they can use for the compound. The patient isn't able to fix this. I've let the compounding pharmacy know to contact insurance.

## 2023-07-21 NOTE — ED TRIAGE NOTES
Triage Assessment     Row Name 07/20/23 2054       Triage Assessment (Adult)    Airway WDL WDL       Respiratory WDL    Respiratory WDL WDL       Cardiac WDL    Cardiac WDL X;chest pain       Chest Pain Assessment    Chest Pain Location anterior chest, left    Chest Pain Radiation jaw;shoulder;arm    Precipitating Factors activity;physical exertion    Associated Signs/Symptoms anxiety;dizziness    Chest Pain Intervention 12-lead ECG obtained;activity minimized       Peripheral/Neurovascular WDL    Peripheral Neurovascular WDL WDL       Cognitive/Neuro/Behavioral WDL    Cognitive/Neuro/Behavioral WDL WDL       [place] Coma Scale    Best Eye Response 4-->(E4) spontaneous    Best Motor Response 6-->(M6) obeys commands    Best Verbal Response 5-->(V5) oriented    [place] Coma Scale Score 15

## 2023-07-21 NOTE — TELEPHONE ENCOUNTER
Nurse Triage SBAR    Is this a 2nd Level Triage? NO    Situation: Chest pain    Background: Patient is complaining of chest pain that began 3 weeks ago.  Patient denies cough but did state that she has had periods of dyspnea at rest.  Patient denies fever.  Patient states that she fell in the dentist office on 7/18/2023 and was taken to the ED but after waiting 5 hours to be seen and being told there was a 13-hour wait ahead of her she left    Assessment: Chest pain    Protocol Recommended Disposition:   Call  Now    Recommendation: Patient verbalized understanding of care advice but stated that group home she lives in probably will not allow her to call 911.    Janel Valencia RN on 7/20/2023 at 8:05 PM      Does the patient meet one of the following criteria for ADS visit consideration? No    Reason for Disposition    [1] Chest pain lasts > 5 minutes AND [2] age > 30 AND [3] one or more cardiac risk factors (e.g., diabetes, high blood pressure, high cholesterol, smoker, or strong family history of heart disease)    Additional Information    Negative: SEVERE difficulty breathing (e.g., struggling for each breath, speaks in single words)    Negative: Difficult to awaken or acting confused (e.g., disoriented, slurred speech)    Negative: Shock suspected (e.g., cold/pale/clammy skin, too weak to stand, low BP, rapid pulse)    Negative: Passed out (i.e., lost consciousness, collapsed and was not responding)    Negative: [1] Chest pain lasts > 5 minutes AND [2] age > 44    Protocols used: CHEST PAIN-A-

## 2023-07-25 ENCOUNTER — TRANSFERRED RECORDS (OUTPATIENT)
Dept: HEALTH INFORMATION MANAGEMENT | Facility: CLINIC | Age: 33
End: 2023-07-25
Payer: COMMERCIAL

## 2023-08-17 ENCOUNTER — APPOINTMENT (OUTPATIENT)
Dept: RADIOLOGY | Facility: CLINIC | Age: 33
End: 2023-08-17
Attending: EMERGENCY MEDICINE
Payer: COMMERCIAL

## 2023-08-17 ENCOUNTER — HOSPITAL ENCOUNTER (EMERGENCY)
Facility: CLINIC | Age: 33
Discharge: ANOTHER HOSPITAL | End: 2023-08-18
Attending: EMERGENCY MEDICINE | Admitting: EMERGENCY MEDICINE
Payer: COMMERCIAL

## 2023-08-17 DIAGNOSIS — R20.0 SADDLE ANESTHESIA: ICD-10-CM

## 2023-08-17 DIAGNOSIS — M54.50 ACUTE LOW BACK PAIN, UNSPECIFIED BACK PAIN LATERALITY, UNSPECIFIED WHETHER SCIATICA PRESENT: ICD-10-CM

## 2023-08-17 LAB
ALBUMIN UR-MCNC: 20 MG/DL
APPEARANCE UR: CLEAR
BACTERIA #/AREA URNS HPF: ABNORMAL /HPF
BILIRUB UR QL STRIP: NEGATIVE
COLOR UR AUTO: YELLOW
GLUCOSE UR STRIP-MCNC: NEGATIVE MG/DL
HGB UR QL STRIP: NEGATIVE
KETONES UR STRIP-MCNC: NEGATIVE MG/DL
LEUKOCYTE ESTERASE UR QL STRIP: NEGATIVE
MUCOUS THREADS #/AREA URNS LPF: PRESENT /LPF
NITRATE UR QL: NEGATIVE
PH UR STRIP: 6 [PH] (ref 5–7)
RBC URINE: 1 /HPF
SP GR UR STRIP: 1.03 (ref 1–1.03)
SQUAMOUS EPITHELIAL: 1 /HPF
UROBILINOGEN UR STRIP-MCNC: 2 MG/DL
WBC URINE: 1 /HPF

## 2023-08-17 PROCEDURE — 99284 EMERGENCY DEPT VISIT MOD MDM: CPT | Mod: 25

## 2023-08-17 PROCEDURE — 81001 URINALYSIS AUTO W/SCOPE: CPT | Performed by: EMERGENCY MEDICINE

## 2023-08-17 PROCEDURE — 96375 TX/PRO/DX INJ NEW DRUG ADDON: CPT

## 2023-08-17 PROCEDURE — 73560 X-RAY EXAM OF KNEE 1 OR 2: CPT | Mod: LT

## 2023-08-17 PROCEDURE — 250N000011 HC RX IP 250 OP 636: Performed by: EMERGENCY MEDICINE

## 2023-08-17 PROCEDURE — 96374 THER/PROPH/DIAG INJ IV PUSH: CPT

## 2023-08-17 PROCEDURE — 99285 EMERGENCY DEPT VISIT HI MDM: CPT | Mod: 25

## 2023-08-17 RX ORDER — DIAZEPAM 10 MG/2ML
2.5 INJECTION, SOLUTION INTRAMUSCULAR; INTRAVENOUS ONCE
Status: COMPLETED | OUTPATIENT
Start: 2023-08-17 | End: 2023-08-17

## 2023-08-17 RX ORDER — KETOROLAC TROMETHAMINE 15 MG/ML
15 INJECTION, SOLUTION INTRAMUSCULAR; INTRAVENOUS ONCE
Status: COMPLETED | OUTPATIENT
Start: 2023-08-17 | End: 2023-08-17

## 2023-08-17 RX ORDER — HYDROMORPHONE HYDROCHLORIDE 1 MG/ML
0.5 INJECTION, SOLUTION INTRAMUSCULAR; INTRAVENOUS; SUBCUTANEOUS ONCE
Status: COMPLETED | OUTPATIENT
Start: 2023-08-17 | End: 2023-08-17

## 2023-08-17 RX ADMIN — HYDROMORPHONE HYDROCHLORIDE 0.5 MG: 1 INJECTION, SOLUTION INTRAMUSCULAR; INTRAVENOUS; SUBCUTANEOUS at 21:25

## 2023-08-17 RX ADMIN — KETOROLAC TROMETHAMINE 15 MG: 15 INJECTION, SOLUTION INTRAMUSCULAR; INTRAVENOUS at 19:56

## 2023-08-17 RX ADMIN — DIAZEPAM 2.5 MG: 5 INJECTION, SOLUTION INTRAMUSCULAR; INTRAVENOUS at 20:27

## 2023-08-17 ASSESSMENT — ACTIVITIES OF DAILY LIVING (ADL)
ADLS_ACUITY_SCORE: 38
ADLS_ACUITY_SCORE: 40
ADLS_ACUITY_SCORE: 40

## 2023-08-17 NOTE — ED NOTES
Expected Patient Referral to ER    3:58 PM    Referring clinic/provider: [facility]    Reason for referral: hx of diabetic neuropathy. Woke up today with pain in right low back, numbness in entire right thigh. Also has vaginal nubnesss. bowel incontinence 1.5 hours ago. Coming by car.    Mode of arrival: car       Jonh Singh MD  08/17/23 1600

## 2023-08-17 NOTE — ED PROVIDER NOTES
EMERGENCY DEPARTMENT ENCOUNTER      NAME: Nevin Alvarado  AGE: 32 year old female  YOB: 1990  MRN: [id number]  EVALUATION DATE & TIME: No admission date for patient encounter.    PCP: Latonya Knight    ED PROVIDER: Jonh Singh M.D.      Chief Complaint   Patient presents with    Numbness    Incontinence         FINAL IMPRESSION:  1. Acute low back pain, unspecified back pain laterality, unspecified whether sciatica present    2. Saddle anesthesia          ED COURSE & MEDICAL DECISION MAKING:    Pertinent Labs & Imaging studies reviewed below.  All EKGs below represent my independent interpretation.   ED Course as of 08/18/23 0023   Thu Aug 17, 2023   1918 Patient is a 32-year-old woman who presents with acute onset of right low back pain that radiates to her right thigh and groin.  She notes paresthesias that extend to her vagina and rectum.  Concern for cauda equina.  Unable to perform sensory evaluation as she was seen on the waiting room.  MRI was ordered, Toradol for pain, Valium for claustrophobia.   2057 Pt does not fit in MRI   Patient has been able to fit in the MRI scanner is a [facility].  I called over and confirmed with their imaging department that both [facility] and [facility] MRI tubes at the same diameter.  I spoke to the ED provider at the [facility] who accepted transfer.  Patient does not have any urinary retention here, nor does she have any acute weakness of her lower extremities, nor does she have decreased rectal tone.  I do not think she needs to be sent over by lights and sirens, but this imaging does need to be completed tonight.  She was given a dose of Dilaudid for pain relief.  Awaiting ambulance transfer.      Of note, while the patient was here and going to the bathroom, her left leg gave out and she heard a pop in the knee.  On my reevaluation she does not have any deformity of the left knee, no focal tenderness.  X-ray was ordered and did not show  any bony abnormality.  If she has persistent knee pain, she could follow up with PCP or [facility].      Additional ED Course Timestamps:  6:57 PM  I met the patient and performed my initial interview and exam.   9:17 PM I updated the patient on imaging results.    Medical Decision Making    History:  Supplemental history from: N/A  External Record(s) reviewed: Documented in chart, if applicable.    Work Up:  Chart documentation includes differential considered and any EKGs or imaging independently interpreted by provider, where specified.  In additional to work up documented, I considered the following work up: Documented in chart, if applicable.    External consultation:  Discussion of management with another provider: Documented in chart, if applicable    Complicating factors:  Care impacted by chronic illness: Mental Health and Other: polyneuropathy, chronic pelvic pain, pulmonary embolism, right leg paresthesias, syncopy  Care affected by social determinants of health: N/A    Disposition considerations:  transfer    At the conclusion of the encounter I discussed the results of all of the tests and the disposition. The questions were answered. The patient or family acknowledged understanding and was agreeable with the care plan.       MEDICATIONS GIVEN IN THE EMERGENCY:  Medications   HYDROmorphone (PF) (DILAUDID) injection 0.5 mg (has no administration in time range)   diazepam (VALIUM) injection 2.5 mg (2.5 mg Intravenous $Given 8/17/23 2027)   ketorolac (TORADOL) injection 15 mg (15 mg Intravenous $Given 8/17/23 1956)   HYDROmorphone (PF) (DILAUDID) injection 0.5 mg (0.5 mg Intravenous $Given 8/17/23 2125)         NEW PRESCRIPTIONS STARTED AT TODAY'S ER VISIT  New Prescriptions    No medications on file          =================================================================    HPI    Patient information was obtained from: patient     Use of : N/A         Nevin Alvarado is a 32  "year old female with a pertinent history of mental health diagnoses, polyneuropathy, chronic pelvic pain, pulmonary embolism, right leg paresthesias and syncopy who presents to this ED for evaluation of numbness and incontinence.    Per chart review, the patient was seen at [facility] on 8/17/2023 for right leg numbness and incontinence. Patient was immediately transferred to the [facility] ED for further treatment and evaluation.        The patient endorses numbness, described as \"pins and needles\" on her right thigh after feeling an \"electrical shock\" last night (8/16). At approximately 6:45 AM today, she felt the numbness radiate to her right buttock, down to her right foot and to her groin area. One hour before her urgency room visit today, she had an episode of uncontrolled bowel incontinence. She has taken no pain medications today and denies any recent injuries to cause the numbness.      VITALS:  BP (!) 142/74   Pulse 83   Temp 97.8  F (36.6  C) (Oral)   Resp 16   Ht 1.575 m (5' 2\")   Wt 140.6 kg (310 lb)   SpO2 97%   BMI 56.70 kg/m      PHYSICAL EXAM    Constitutional: Well developed, well nourished. Uncomfortable appearing.  HENT: Normocephalic, atraumatic, mucous membranes moist, nose normal. Neck- Supple, gross ROM intact.   Eyes: Pupils mid-range, conjunctiva without injection, no discharge.   Respiratory: Clear to auscultation bilaterally, no respiratory distress, no wheezing, speaks full sentences easily. No cough.  Cardiovascular: Normal heart rate, regular rhythm, no murmurs.   GI: Soft, no tenderness to deep palpation in all quadrants, no masses.  Musculoskeletal: Moving all 4 extremities intentionally and without pain. No obvious deformity.   Skin: Warm, dry, no rash.  Neurologic: Alert & oriented x 3, cranial nerves grossly intact. Decreased sensation to pinpoint diffusely around rectum, right more diminished than left, normal rectal tone, 5/5 strength in lower " extremities.  Psychiatric: Affect normal, cooperative.      I, Patricia Woodall am serving as a scribe to document services personally performed by Dr. Jonh Singh based on my observation and the provider's statements to me. I, Jonh Singh MD attest that Patricia Woodall is acting in a scribe capacity, has observed my performance of the services and has documented them in accordance with my direction.    Jonh Singh M.D.  Emergency Medicine  [facility] EMERGENCY ROOM  [address] [id number] [phone]  Dept: [phone]       Jonh Singh MD  08/18/23 0027

## 2023-08-17 NOTE — ED TRIAGE NOTES
"Pt has right knee brace for a torn acl.  She woke this am with numbness to right thigh area and it continues throughout day.  States she did have uncontrolled bowel incontinence.  States she has \"saddle area\" numbness as well.  Denies any new injuries today        "

## 2023-08-18 ENCOUNTER — APPOINTMENT (OUTPATIENT)
Dept: MRI IMAGING | Facility: CLINIC | Age: 33
End: 2023-08-18
Attending: EMERGENCY MEDICINE
Payer: COMMERCIAL

## 2023-08-18 ENCOUNTER — HOSPITAL ENCOUNTER (EMERGENCY)
Facility: CLINIC | Age: 33
Discharge: HOME HEALTH | End: 2023-08-18
Attending: EMERGENCY MEDICINE | Admitting: EMERGENCY MEDICINE
Payer: COMMERCIAL

## 2023-08-18 VITALS
HEIGHT: 62 IN | SYSTOLIC BLOOD PRESSURE: 137 MMHG | RESPIRATION RATE: 16 BRPM | HEART RATE: 92 BPM | DIASTOLIC BLOOD PRESSURE: 89 MMHG | OXYGEN SATURATION: 97 % | TEMPERATURE: 99 F | WEIGHT: 293 LBS | BODY MASS INDEX: 53.92 KG/M2

## 2023-08-18 VITALS
WEIGHT: 293 LBS | TEMPERATURE: 97.8 F | SYSTOLIC BLOOD PRESSURE: 138 MMHG | HEART RATE: 82 BPM | HEIGHT: 62 IN | BODY MASS INDEX: 53.92 KG/M2 | RESPIRATION RATE: 16 BRPM | DIASTOLIC BLOOD PRESSURE: 72 MMHG | OXYGEN SATURATION: 95 %

## 2023-08-18 DIAGNOSIS — M54.50 LOW BACK PAIN, UNSPECIFIED BACK PAIN LATERALITY, UNSPECIFIED CHRONICITY, UNSPECIFIED WHETHER SCIATICA PRESENT: ICD-10-CM

## 2023-08-18 LAB
ANION GAP SERPL CALCULATED.3IONS-SCNC: 12 MMOL/L (ref 7–15)
BUN SERPL-MCNC: 12.2 MG/DL (ref 6–20)
CALCIUM SERPL-MCNC: 8.8 MG/DL (ref 8.6–10)
CHLORIDE SERPL-SCNC: 109 MMOL/L (ref 98–107)
CREAT SERPL-MCNC: 0.61 MG/DL (ref 0.51–0.95)
DEPRECATED HCO3 PLAS-SCNC: 22 MMOL/L (ref 22–29)
GFR SERPL CREATININE-BSD FRML MDRD: >90 ML/MIN/1.73M2
GLUCOSE BLDC GLUCOMTR-MCNC: 101 MG/DL (ref 70–99)
GLUCOSE SERPL-MCNC: 121 MG/DL (ref 70–99)
HCG SERPL QL: NEGATIVE
POTASSIUM SERPL-SCNC: 3.6 MMOL/L (ref 3.4–5.3)
SODIUM SERPL-SCNC: 143 MMOL/L (ref 136–145)

## 2023-08-18 PROCEDURE — 250N000013 HC RX MED GY IP 250 OP 250 PS 637: Performed by: EMERGENCY MEDICINE

## 2023-08-18 PROCEDURE — 250N000011 HC RX IP 250 OP 636: Performed by: EMERGENCY MEDICINE

## 2023-08-18 PROCEDURE — 84703 CHORIONIC GONADOTROPIN ASSAY: CPT | Performed by: EMERGENCY MEDICINE

## 2023-08-18 PROCEDURE — 250N000011 HC RX IP 250 OP 636: Mod: JZ | Performed by: EMERGENCY MEDICINE

## 2023-08-18 PROCEDURE — 96374 THER/PROPH/DIAG INJ IV PUSH: CPT | Mod: 59

## 2023-08-18 PROCEDURE — 255N000002 HC RX 255 OP 636: Mod: JZ | Performed by: EMERGENCY MEDICINE

## 2023-08-18 PROCEDURE — 96375 TX/PRO/DX INJ NEW DRUG ADDON: CPT

## 2023-08-18 PROCEDURE — 72158 MRI LUMBAR SPINE W/O & W/DYE: CPT

## 2023-08-18 PROCEDURE — 999N000127 HC STATISTIC PERIPHERAL IV START W US GUIDANCE

## 2023-08-18 PROCEDURE — 36415 COLL VENOUS BLD VENIPUNCTURE: CPT | Performed by: EMERGENCY MEDICINE

## 2023-08-18 PROCEDURE — 96376 TX/PRO/DX INJ SAME DRUG ADON: CPT

## 2023-08-18 PROCEDURE — 99284 EMERGENCY DEPT VISIT MOD MDM: CPT | Performed by: EMERGENCY MEDICINE

## 2023-08-18 PROCEDURE — 82962 GLUCOSE BLOOD TEST: CPT

## 2023-08-18 PROCEDURE — 80048 BASIC METABOLIC PNL TOTAL CA: CPT | Performed by: EMERGENCY MEDICINE

## 2023-08-18 PROCEDURE — A9585 GADOBUTROL INJECTION: HCPCS | Mod: JZ | Performed by: EMERGENCY MEDICINE

## 2023-08-18 PROCEDURE — 96376 TX/PRO/DX INJ SAME DRUG ADON: CPT | Mod: 59

## 2023-08-18 PROCEDURE — 99285 EMERGENCY DEPT VISIT HI MDM: CPT | Mod: 25

## 2023-08-18 PROCEDURE — 72158 MRI LUMBAR SPINE W/O & W/DYE: CPT | Mod: 26 | Performed by: RADIOLOGY

## 2023-08-18 RX ORDER — LORAZEPAM 2 MG/ML
0.5 INJECTION INTRAMUSCULAR ONCE
Status: DISCONTINUED | OUTPATIENT
Start: 2023-08-18 | End: 2023-08-18

## 2023-08-18 RX ORDER — KETOROLAC TROMETHAMINE 15 MG/ML
10 INJECTION, SOLUTION INTRAMUSCULAR; INTRAVENOUS ONCE
Status: DISCONTINUED | OUTPATIENT
Start: 2023-08-18 | End: 2023-08-18 | Stop reason: HOSPADM

## 2023-08-18 RX ORDER — ACETAMINOPHEN 500 MG
1000 TABLET ORAL ONCE
Status: COMPLETED | OUTPATIENT
Start: 2023-08-18 | End: 2023-08-18

## 2023-08-18 RX ORDER — LIDOCAINE 4 G/G
2 PATCH TOPICAL ONCE
Status: DISCONTINUED | OUTPATIENT
Start: 2023-08-18 | End: 2023-08-18

## 2023-08-18 RX ORDER — DIAZEPAM 10 MG/2ML
2.5 INJECTION, SOLUTION INTRAMUSCULAR; INTRAVENOUS ONCE
Status: COMPLETED | OUTPATIENT
Start: 2023-08-18 | End: 2023-08-18

## 2023-08-18 RX ORDER — LIDOCAINE 4 G/G
2 PATCH TOPICAL ONCE
Status: DISCONTINUED | OUTPATIENT
Start: 2023-08-18 | End: 2023-08-18 | Stop reason: HOSPADM

## 2023-08-18 RX ORDER — ONDANSETRON 2 MG/ML
4 INJECTION INTRAMUSCULAR; INTRAVENOUS EVERY 30 MIN PRN
Status: DISCONTINUED | OUTPATIENT
Start: 2023-08-18 | End: 2023-08-18 | Stop reason: HOSPADM

## 2023-08-18 RX ORDER — GADOBUTROL 604.72 MG/ML
10 INJECTION INTRAVENOUS ONCE
Status: COMPLETED | OUTPATIENT
Start: 2023-08-18 | End: 2023-08-18

## 2023-08-18 RX ORDER — HYDROMORPHONE HYDROCHLORIDE 1 MG/ML
0.5 INJECTION, SOLUTION INTRAMUSCULAR; INTRAVENOUS; SUBCUTANEOUS
Status: DISCONTINUED | OUTPATIENT
Start: 2023-08-18 | End: 2023-08-18 | Stop reason: HOSPADM

## 2023-08-18 RX ORDER — KETOROLAC TROMETHAMINE 30 MG/ML
30 INJECTION, SOLUTION INTRAMUSCULAR; INTRAVENOUS ONCE
Status: DISCONTINUED | OUTPATIENT
Start: 2023-08-18 | End: 2023-08-18 | Stop reason: DRUGHIGH

## 2023-08-18 RX ORDER — KETOROLAC TROMETHAMINE 30 MG/ML
30 INJECTION, SOLUTION INTRAMUSCULAR; INTRAVENOUS ONCE
Status: COMPLETED | OUTPATIENT
Start: 2023-08-18 | End: 2023-08-18

## 2023-08-18 RX ORDER — DIPHENHYDRAMINE HCL 25 MG
25 CAPSULE ORAL ONCE
Status: COMPLETED | OUTPATIENT
Start: 2023-08-18 | End: 2023-08-18

## 2023-08-18 RX ADMIN — GADOBUTROL 10 ML: 604.72 INJECTION INTRAVENOUS at 11:38

## 2023-08-18 RX ADMIN — ACETAMINOPHEN 1000 MG: 500 TABLET ORAL at 12:49

## 2023-08-18 RX ADMIN — DIAZEPAM 2.5 MG: 5 INJECTION, SOLUTION INTRAMUSCULAR; INTRAVENOUS at 09:44

## 2023-08-18 RX ADMIN — HYDROMORPHONE HYDROCHLORIDE 0.5 MG: 1 INJECTION, SOLUTION INTRAMUSCULAR; INTRAVENOUS; SUBCUTANEOUS at 03:53

## 2023-08-18 RX ADMIN — DIPHENHYDRAMINE HYDROCHLORIDE 25 MG: 25 CAPSULE ORAL at 05:01

## 2023-08-18 RX ADMIN — KETOROLAC TROMETHAMINE 30 MG: 30 INJECTION, SOLUTION INTRAMUSCULAR; INTRAVENOUS at 09:26

## 2023-08-18 RX ADMIN — ONDANSETRON 4 MG: 2 INJECTION INTRAMUSCULAR; INTRAVENOUS at 11:47

## 2023-08-18 RX ADMIN — LIDOCAINE PATCH 4% 2 PATCH: 40 PATCH TOPICAL at 11:51

## 2023-08-18 RX ADMIN — HYDROMORPHONE HYDROCHLORIDE 0.5 MG: 1 INJECTION, SOLUTION INTRAMUSCULAR; INTRAVENOUS; SUBCUTANEOUS at 00:30

## 2023-08-18 RX ADMIN — ONDANSETRON 4 MG: 2 INJECTION INTRAMUSCULAR; INTRAVENOUS at 08:16

## 2023-08-18 ASSESSMENT — ACTIVITIES OF DAILY LIVING (ADL)
ADLS_ACUITY_SCORE: 40

## 2023-08-18 NOTE — PROGRESS NOTES
Received a call form Nevin at [facility] asking for an update. Legal guardian ARMIN GARZA (ph # [phone]) called to make sure it was okay to share information with Nevin at [facility], and legal guardian said that was okay. Nevin at [facility] updated and can be reached at [phone] for updates and when patient is ready to discharge.

## 2023-08-18 NOTE — ED NOTES
EMS transfer ETA of 0300 per charge RN.  Attempted to call patient report at this time but unable.  Korina Sharp RN on 8/17/2023 at 11:56 PM

## 2023-08-18 NOTE — ED NOTES
Bed: IN03  Expected date:   Expected time:   Means of arrival:   Comments:  Nevin Alvarado [phone] r/o cauda equina. Could not get MRI due to size but has had one here before     Fausto Miller MD  08/18/23 0759

## 2023-08-18 NOTE — ED NOTES
Pt report called to Jarek Corley RN at [facility].  Awaiting transport at this time.  Korina Sharp RN on 8/18/2023 at 2:49 AM

## 2023-08-18 NOTE — ED NOTES
EMS arrived at this time for pt transport to [place].  Paperwork and report provided to transport crew.  Pt belongings sent with patient.  Pt transported via EMS at this time.  Korina Sharp RN on 8/18/2023 at 4:05 AM

## 2023-08-18 NOTE — ED PROVIDER NOTES
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 Lamictal. ; HUT Reaction: Rash; HUT Reaction Type: Allergy; HUT Severity: Low; HUT Noted: [id number]    No Clinical Screening - See Comments Rash and Other (See Comments)     Silicone type  Silicone type  See original file MRN [id number]. Files are marked for merge  History of  "swallowing sharp metallic objects. She should not be prescribed lancets due to posed risk of swallowing.      Family History  Family History   Problem Relation Age of Onset    Diabetes Type 2  Maternal Grandmother     Diabetes Type 2  Paternal Grandmother     Breast Cancer Paternal Grandmother     Cerebrovascular Disease Father 53    Myocardial Infarction No family hx of     Coronary Artery Disease Early Onset No family hx of     Ovarian Cancer No family hx of     Colon Cancer No family hx of     Depression Mother     Anxiety Disorder Mother      Social History   Social History     Tobacco Use    Smoking status: Never    Smokeless tobacco: Never   Substance Use Topics    Alcohol use: No     Alcohol/week: 0.0 standard drinks of alcohol    Drug use: No         A medically appropriate review of systems was performed with pertinent positives and negatives noted in the HPI, and all other systems negative.    Physical Exam   BP: 135/72  Pulse: 86  Temp: 99  F (37.2  C)  Resp: 18  Height: 157.5 cm (5' 2\")  Weight: 140.6 kg (310 lb)  Physical Exam  Physical Exam   Constitutional: oriented to person, place, and time. appears well-developed and well-nourished.   HENT:   Head: Normocephalic and atraumatic.   Neck: Normal range of motion.   Pulmonary/Chest: Effort normal. No respiratory distress.   Cardiac: No murmurs, rubs, gallops. RRR.  Abdominal: Abdomen soft, nontender, nondistended. No rebound tenderness.  MSK: Long bones without deformity or evidence of trauma  Neurological: alert and oriented to person, place, and time.  Diminished sensation to the right lower extremity compared to the left.  5 out of 5 strength in lower extremities and hips.  Skin: Skin is warm and dry.   Psychiatric:  normal mood and affect.  behavior is normal. Thought content normal.       ED Course, Procedures, & Data      Procedures              Results for orders placed or performed during the hospital encounter of 08/17/23   XR Knee Left 1/2 " Views     Status: None    Narrative    EXAM: XR KNEE LEFT 1/2 VIEWS  LOCATION: [facility]  DATE: 8/17/2023    INDICATION: pain after fall  COMPARISON: None.      Impression    IMPRESSION: Normal joint spaces and alignment. No fracture or joint effusion.   UA with Microscopic reflex to Culture     Status: Abnormal    Specimen: Urine, Clean Catch   Result Value Ref Range    Color Urine Yellow Colorless, Straw, Light Yellow, Yellow    Appearance Urine Clear Clear    Glucose Urine Negative Negative mg/dL    Bilirubin Urine Negative Negative    Ketones Urine Negative Negative mg/dL    Specific Gravity Urine 1.029 1.001 - 1.030    Blood Urine Negative Negative    pH Urine 6.0 5.0 - 7.0    Protein Albumin Urine 20 (A) Negative mg/dL    Urobilinogen Urine 2.0 (A) <2.0 mg/dL    Nitrite Urine Negative Negative    Leukocyte Esterase Urine Negative Negative    Bacteria Urine Few (A) None Seen /HPF    Mucus Urine Present (A) None Seen /LPF    RBC Urine 1 <=2 /HPF    WBC Urine 1 <=5 /HPF    Squamous Epithelials Urine 1 <=1 /HPF    Narrative    Urine Culture not indicated     Medications   diphenhydrAMINE (BENADRYL) capsule 25 mg (has no administration in time range)     Labs Ordered and Resulted from Time of ED Arrival to Time of ED Departure - No data to display  MR Lumbar Spine w/o & w Contrast    (Results Pending)          Critical care was not performed.     Medical Decision Making  The patient's presentation was of high complexity (an acute health issue posing potential threat to life or bodily function).    The patient's evaluation involved:  review of external note(s) from 1 sources (emergency department note from hospital)  ordering and/or review of 3+ test(s) in this encounter (see separate area of note for details)    The patient's management necessitated further care after sign-out to Dr. Barlow (see their note for further management).    Assessment & Plan    MDM  Patient presenting with  concerns for cauda equina.  Here symptoms are unchanged.  Overall lower suspicion for cauda equina she has had the symptoms in the past according to the chart.  MRI pending.  Likely can be discharged if unremarkable.  Would not give pain medications.  One-to-one at the bedside on arrival.    I have reviewed the nursing notes. I have reviewed the findings, diagnosis, plan and need for follow up with the patient.    New Prescriptions    No medications on file       Final diagnoses:   Low back pain, unspecified back pain laterality, unspecified chronicity, unspecified whether sciatica present       Dick Lovell  [facility] EMERGENCY DEPARTMENT  8/18/2023     Dick Lovell MD  08/18/23 0601

## 2023-08-18 NOTE — ED NOTES
Pt called with c/o increased numbness into her leg.  Pt was repositioned to see if this can relieve some of the numbness as pt was lying flat on her back.  Pt was informed on the importance of the MRI to evaluate the problem for correct treatment.  Pt stated understanding.  MD updated on pt status.  Korina Sharp RN on 8/18/2023 at 1:40 AM

## 2023-08-18 NOTE — ED PROVIDER NOTES
"     Emergency Department Patient Sign-out       Brief HPI:  This is a 32 year old female signed out to me by Dr. Lovell .  See initial ED Provider note for details of the presentation.            Significant Events prior to my assuming care: Patient with back pain and leg numbness sent for MRI L spine.       Exam:   Patient Vitals for the past 24 hrs:   BP Temp Temp src Pulse Resp SpO2 Height Weight   08/18/23 0500 137/89 -- -- 92 -- -- -- --   08/18/23 0448 135/72 -- -- 88 -- 97 % -- --   08/18/23 0447 135/72 99  F (37.2  C) Oral 86 18 -- 1.575 m (5' 2\") 140.6 kg (310 lb)           ED RESULTS:   Results for orders placed or performed during the hospital encounter of 08/18/23 (from the past 24 hour(s))   Basic metabolic panel     Status: Abnormal    Collection Time: 08/18/23  5:22 AM   Result Value Ref Range    Sodium 143 136 - 145 mmol/L    Potassium 3.6 3.4 - 5.3 mmol/L    Chloride 109 (H) 98 - 107 mmol/L    Carbon Dioxide (CO2) 22 22 - 29 mmol/L    Anion Gap 12 7 - 15 mmol/L    Urea Nitrogen 12.2 6.0 - 20.0 mg/dL    Creatinine 0.61 0.51 - 0.95 mg/dL    Calcium 8.8 8.6 - 10.0 mg/dL    Glucose 121 (H) 70 - 99 mg/dL    GFR Estimate >90 >60 mL/min/1.73m2   HCG qualitative pregnancy (blood)     Status: Normal    Collection Time: 08/18/23  5:22 AM   Result Value Ref Range    hCG Serum Qualitative Negative Negative   MR Lumbar Spine w/o & w Contrast     Status: None    Collection Time: 08/18/23 11:37 AM    Narrative    MR LUMBAR SPINE W/O & W CONTRAST 8/18/2023 11:37 AM    Provided History: R leg numbness/tingling concern for cauda equina    ICD-10: R leg numbness/tingling concern for cauda equina    Comparison: CT lumbar spine 6/28/2023. MRI lumbar spine 5/23/2023.    Contrast: 10 mL Gadavist    Technique: Sagittal T1-weighted, sagittal STIR, sagittal  diffusion-weighted (with ADC map), axial T1-weighted, and 3D  volumetric axial and sagittal reconstructed T2-weighted images of the  lumbar spine were obtained without " intravenous contrast. After the  administration of intravenous contrast, axial and sagittal  fat-saturated T1-weighted images of the lumbar spine were obtained.    Findings: There are 5 lumbar-type vertebrae assumed for the purposes  of this dictation.  The tip of the conus medullaris is at L1.  Normal  lumbar vertebral alignment. No loss of disc height. Minimal  degenerative marrow signal in the anterior endplates of L4-5 and  L5-S1. Diminished signal intensity of fluid in the subarachnoid space  on T2 SPACE with normal signal on other sequences, therefore  artifactual and related to technique. Normal marrow signal. T1  hyperintensity of the filum terminale measuring less than 3 mm in  thickness.    On a level by level basis:    T12-L1: No spinal canal or neuroforaminal stenosis.    L1-2: No spinal canal or neuroforaminal stenosis.    L2-3: No spinal canal or neuroforaminal stenosis.    L3-4: No spinal canal or neuroforaminal stenosis.    L4-5: No spinal canal or neuroforaminal stenosis.    L5-S1: No spinal canal or neuroforaminal stenosis.    Contrast-enhanced images demonstrate no abnormal enhancement in the  visualized paraspinous tissues anteriorly, in the spinal canal, or  vertebrae.    Diffuse atrophy of the erector spinae musculature, as well as of the  partially visualized upper gluteal musculature.    Mild to moderate lumbar subcutaneous edema.       Impression    Impression:   1. No spinal canal or neural foraminal stenosis.  2. No mass or abnormal enhancement of the cauda equina.  3. Incidental fatty infiltration versus fibrolipoma of the filum  terminale. No cord tethering.  4. Muscular atrophy.    I have personally reviewed the examination and initial interpretation  and I agree with the findings.    PHU JULES MD         SYSTEM ID:  [id number]   Glucose by meter     Status: Abnormal    Collection Time: 08/18/23 11:49 AM   Result Value Ref Range    GLUCOSE BY METER POCT 101 (H) 70 - 99 mg/dL        ED MEDICATIONS:   Medications   ondansetron (ZOFRAN) injection 4 mg (4 mg Intravenous $Given 8/18/23 1147)   ketorolac (TORADOL) injection 10 mg (has no administration in time range)   Lidocaine (LIDOCARE) 4 % Patch 2 patch (2 patches Transdermal $Patch/Med Applied 8/18/23 1151)   acetaminophen (TYLENOL) tablet 1,000 mg (has no administration in time range)   diphenhydrAMINE (BENADRYL) capsule 25 mg (25 mg Oral $Given 8/18/23 0501)   ketorolac (TORADOL) injection 30 mg (30 mg Intravenous $Given 8/18/23 0926)   diazepam (VALIUM) injection 2.5 mg (2.5 mg Intravenous $Given 8/18/23 0944)   gadobutrol (GADAVIST) injection 10 mL (10 mLs Intravenous $Given 8/18/23 1138)         Impression:    ICD-10-CM    1. Low back pain, unspecified back pain laterality, unspecified chronicity, unspecified whether sciatica present  M54.50           Plan:    Pending studies include MRI.    Patient given toradol for back pain. Zofran for nausea. Got valium for MRI anxiety as per previous.    MRI wnl. Patient ambulating w/o difficulty. Advised PCP f/up as needed.         MD Cain Singh Jill C, MD  08/18/23 0440

## 2023-08-18 NOTE — ED TRIAGE NOTES
Brought in by ambulance from [facility]  Came in due to lower back pain and right leg numbness and left knee pain.

## 2023-08-29 ENCOUNTER — TELEPHONE (OUTPATIENT)
Dept: NEUROLOGY | Facility: CLINIC | Age: 33
End: 2023-08-29
Payer: COMMERCIAL

## 2023-08-29 ENCOUNTER — NURSE TRIAGE (OUTPATIENT)
Dept: NURSING | Facility: CLINIC | Age: 33
End: 2023-08-29
Payer: COMMERCIAL

## 2023-08-30 ENCOUNTER — TELEPHONE (OUTPATIENT)
Dept: NEUROLOGY | Facility: CLINIC | Age: 33
End: 2023-08-30
Payer: COMMERCIAL

## 2023-08-30 NOTE — TELEPHONE ENCOUNTER
Reason for Disposition    [1] Numbness (i.e., loss of sensation) of the face, arm / hand, or leg / foot on one side of the body AND [2] gradual onset (e.g., days to weeks) AND [3] present now    Protocols used: Neurologic Deficit-A-AH

## 2023-08-30 NOTE — TELEPHONE ENCOUNTER
"Pt reports right leg \"completely numb for a couple of weeks now, everyone tells me there is nothing they can do\". Pt reports she has been seen in ER and UC for her leg pain/numbness. Pt reports leg is \"painful and numb\". Pt is very tearful, states she spoke the \"three different nurse lines\" and they have not been able to assist her. Pt requesting to speak with on call neurologist.    Writer connected pt to paging  for assistance.   "

## 2023-08-30 NOTE — TELEPHONE ENCOUNTER
Tried calling to see how the pt is doing.  The phone rang several times then disconnected.  Tried calling x2

## 2023-08-31 ENCOUNTER — HOSPITAL ENCOUNTER (EMERGENCY)
Facility: CLINIC | Age: 33
Discharge: HOME | End: 2023-08-31
Admitting: PHYSICIAN ASSISTANT
Payer: COMMERCIAL

## 2023-08-31 ENCOUNTER — APPOINTMENT (OUTPATIENT)
Dept: RADIOLOGY | Facility: CLINIC | Age: 33
End: 2023-08-31
Payer: COMMERCIAL

## 2023-08-31 ENCOUNTER — TELEPHONE (OUTPATIENT)
Dept: NEUROLOGY | Facility: CLINIC | Age: 33
End: 2023-08-31
Payer: COMMERCIAL

## 2023-08-31 VITALS
HEART RATE: 98 BPM | OXYGEN SATURATION: 96 % | DIASTOLIC BLOOD PRESSURE: 81 MMHG | RESPIRATION RATE: 18 BRPM | SYSTOLIC BLOOD PRESSURE: 134 MMHG

## 2023-08-31 DIAGNOSIS — S86.919A KNEE STRAIN: ICD-10-CM

## 2023-08-31 PROCEDURE — 73562 X-RAY EXAM OF KNEE 3: CPT | Mod: LT

## 2023-08-31 PROCEDURE — 73590 X-RAY EXAM OF LOWER LEG: CPT | Mod: LT

## 2023-08-31 PROCEDURE — 99283 EMERGENCY DEPT VISIT LOW MDM: CPT

## 2023-08-31 ASSESSMENT — ACTIVITIES OF DAILY LIVING (ADL): ADLS_ACUITY_SCORE: 40

## 2023-08-31 NOTE — ED TRIAGE NOTES
Patient arrived via [facility] from [facility]. Patient stated she was attempting to walk around her walker (neuropathy) when she tripped over the walker and fell hitting her left knee and felt a pop. 6/10 shooting pain. Patient has current chronic numbness in her right leg. When she got up she felt a crushing sense in her left knee. Patient was given 1G tylenol via EMS. .     Patients group home staff stated to EMS that the patient was not to have narcotics.

## 2023-08-31 NOTE — ED NOTES
Bed: [facility]-  Expected date: 8/31/23  Expected time: 5:15 PM  Means of arrival: Ambulance  Comments:  [facility]   33 yo male   Tripped over a walker  Left knee injury   Spironolactone Counseling: Patient advised regarding risks of diarrhea, abdominal pain, hyperkalemia, birth defects (for female patients), liver toxicity and renal toxicity. The patient may need blood work to monitor liver and kidney function and potassium levels while on therapy. The patient verbalized understanding of the proper use and possible adverse effects of spironolactone.  All of the patient's questions and concerns were addressed.

## 2023-08-31 NOTE — TELEPHONE ENCOUNTER
Per 8/30 message from Dr. Martinez- please call pt.  Called pt and she stated she was okay but not okay.  Pt stated for the last 3 weeks has constant right leg numbness from hip to her knee on the inside and outside part of her thigh and reported intermittent loss of bowel and bladder. She stated has right leg constant burning nerve pain from right hip to right knee on top of her thigh.  Pt stated she wears a knee brace for torn ACL and can't feel the brace.  She stated she is tired of going to ER and was in [facility] on August 17 and had MRI.  Pt stated she was surprised that I called her as she stated Dr. Martinez told her she was not going to follow up with her.  Pt states she has an appt with a neuromuscular specialist on Sept 27 at [facility] in [place]. Pt stated she would like to hear back from us. Informed her I will send a message to Dr. Martinez.

## 2023-08-31 NOTE — ED PROVIDER NOTES
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 % external cream, Apply 1 Application topically 2 times daily as needed Apply thinly to knee 2 times daily, Monday through Fridays, off on weekends as needed. Avoid face and skin folds., Disp: , Rfl:     furosemide (LASIX)  20 MG tablet, Take 20 mg by mouth daily, Disp: , Rfl:     gabapentin 8 % in PLO cream, Apply 1 click (0.25 g) topically every 8 hours as needed for neuropathic pain (right thigh), Disp: 30 g, Rfl: 4    hydroxychloroquine (PLAQUENIL) 200 MG tablet, Take 1 tablet (200 mg) by mouth 2 times daily, Disp: 30 tablet, Rfl: 0    ibuprofen (ADVIL/MOTRIN) 600 MG tablet, Take 1 tablet (600 mg) by mouth every 8 hours as needed for moderate pain (Patient taking differently: Take 600 mg by mouth every 8 hours as needed for moderate pain prn), Disp: 24 tablet, Rfl: 0    ketorolac (TORADOL) 10 MG tablet, Take 1 tablet (10 mg) by mouth every 6 hours as needed for moderate pain, Disp: 20 tablet, Rfl: 0    metFORMIN (GLUCOPHAGE XR) 500 MG 24 hr tablet, Take 1,000 mg by mouth daily (with breakfast), Disp: , Rfl:     montelukast (SINGULAIR) 10 MG tablet, Take 10 mg by mouth every evening, Disp: , Rfl:     omeprazole (PRILOSEC) 40 MG DR capsule, Take 1 capsule (40 mg) by mouth daily, Disp: 90 capsule, Rfl: 3    ondansetron (ZOFRAN-ODT) 4 MG ODT tab, Take 1 tablet (4 mg) by mouth every 8 hours as needed for nausea, Disp: 15 tablet, Rfl: 0    pregabalin (LYRICA) 100 MG capsule, Take 1 capsule (100 mg) by mouth 3 times daily, Disp: 90 capsule, Rfl: 0    Respiratory Therapy Supplies (NEBULIZER) BRENDAN, Nebulizer device.  Albuterol nebulization every 2 hours as needed for shortness of breath or wheezing., Disp: 1 each, Rfl: 0    saline nasal (AYR SALINE) GEL topical gel, Apply into each nare 2 times daily Place in front of each side of your nose and breathe in to distribute gel twice daily., Disp: 14.1 g, Rfl: 11    Semaglutide 3 MG TABS, Take 3 mg by mouth daily before breakfast Then 7mg daily for second month. Then 14 mg daily, Disp: , Rfl:     sodium chloride (OCEAN) 0.65 % nasal spray, Spray 2 sprays in each side of the nose every 3 hours while awake., Disp: 44 mL, Rfl: 11    SUMAtriptan (IMITREX) 25 MG tablet, Take 25 mg by mouth at onset  of headache for migraine May repeat in 2 hours., Disp: , Rfl:     valACYclovir (VALTREX) 1000 mg tablet, Take 2,000 mg by mouth 2 times daily as needed Take 2 tablets by mouth two times daily for one day. Use as needed at onset of cold sore., Disp: , Rfl:     ALLERGIES:  Allergies   Allergen Reactions    Amoxicillin-Pot Clavulanate Other (See Comments), Swelling and Rash     PN: facial swelling, left side. Also had cortisone injection the same day as she started the Augmentin.  Noted in downtime recovery of chart.    PN: facial swelling, left side. Also had cortisone injection the same day as she started the Augmentin.; HUT Comment: PN: facial swelling, left side. Also had cortisone injection the same day as she started the Augmentin.Noted in downtime recovery of chart.; HUT Reaction: Rash; HUT Reaction: Unknown; HUT Reaction Type: Allergy; HUT Severity: Med; HUT Noted: [id number]  PN: facial swelling, left side. Also had cortisone injection the same day as she started the Augmentin.  Other reaction(s): *Unknown  PN: facial swelling, left side. Also had cortisone injection the same day as she started the Augmentin.  Noted in downtime recovery of chart.  PN: facial swelling, left side. Also had cortisone injection the same day as she started the Augmentin.  Other reaction(s): Facial swelling  Other reaction(s): Facial swelling    Hydrocodone Nausea and Vomiting and GI Disturbance     vomiting for days, PN: vomiting for days; HUT Comment: vomiting for days; HUT Reaction: Gastrointestinal; HUT Reaction: Nausea And Vomiting; HUT Reaction Type: Intolerance; HUT Severity: Med; HUT Noted: [id number]  vomiting for days    Other reaction(s): Rash    Hydrocodone-Acetaminophen Nausea and Vomiting and Rash     Update on 12/12  Pt says she can take tylenol just not the hydrocodone.   Other reaction(s): Rash      Influenza Vaccines Other (See Comments) and Nausea and Vomiting     HUT Reaction: Nausea And Vomiting; HUT Reaction Type:  Intolerance; HUT Severity: Low; HUT Noted: [id number]    Latex Rash     HUT Reaction: Rash; HUT Reaction Type: Allergy; HUT Severity: Low; HUT Noted: [id number]  Other reaction(s): Rash      Oseltamivir Hives     med stopped, PN: med stopped  med stopped, PN: med stopped; HUT Comment: med stopped, PN: med stopped; HUT Reaction: Hives; HUT Reaction Type: Allergy; HUT Severity: Med; HUT Noted: [id number]    Penicillins Anaphylaxis     HUT Reaction: Anaphylaxis; HUT Reaction Type: Allergy; HUT Severity: High; HUT Noted: [id number]    Vancomycin Itching, Swelling and Rash     Other reaction(s): Redness  Flushed face, very itchy; HUT Comment: Flushed face, very itchy; HUT Reaction: Angioedema; HUT Reaction: Redness; HUT Severity: Med; HUT Noted: [id number]    facial    Blood-Group Specific Substance Other (See Comments)     Patient has an anti-Cw and non-specific antibodies. Blood product orders may be delayed. Draw one red top and two purple top tubes for all type/screen/crossmatch orders.  Patient has anti-Cw, anti-K ([place]), Warm auto and nonspecific antibodies. Blood products may be delayed. Draw patient 24 hours prior to transfusion. Draw one red top and two purple top tubes for all type and screen orders.    Clavulanic Acid Angioedema    Fentanyl Itching    Haemophilus B Polysaccharide Vaccine Nausea and Vomiting    Naltrexone Other (See Comments)     Reaction(s): Vivid dreams.    Other Drug Allergy (See Comments)      See original file MRN [id number]. Files are marked for merge    Oxycodone Swelling    Adhesive Tape Rash     Silicone type  Silicone type    Other reaction(s): adhesive allergy  Other reaction(s): adhesive allergy  Silicone type    Other reaction(s): adhesive allergy      Band-Aid Anti-Itch      Other reaction(s): adhesive allergy    Cephalosporins Rash    Lamotrigine Rash     Possibly associated with Lamictal.   HUT Comment: Possibly associated with Lamictal. ; HUT Reaction: Rash; HUT Reaction Type: Allergy;  HUT Severity: Low; HUT Noted: [id number]    No Clinical Screening - See Comments Rash and Other (See Comments)     Silicone type  Silicone type  See original file MRN [id number]. Files are marked for merge  History of swallowing sharp metallic objects. She should not be prescribed lancets due to posed risk of swallowing.        FAMILY HISTORY:  Family History   Problem Relation Age of Onset    Diabetes Type 2  Maternal Grandmother     Diabetes Type 2  Paternal Grandmother     Breast Cancer Paternal Grandmother     Cerebrovascular Disease Father 53    Myocardial Infarction No family hx of     Coronary Artery Disease Early Onset No family hx of     Ovarian Cancer No family hx of     Colon Cancer No family hx of     Depression Mother     Anxiety Disorder Mother          VITALS:  Vitals:    08/31/23 1736 08/31/23 1804   BP: 134/81    Pulse: 98    Resp:  18   SpO2: 96%        PHYSICAL EXAM    General Appearance:  Alert, cooperative, no distress, appears stated age  HENT: Normocephalic without obvious deformity, atraumatic. Mucous membranes moist   Eyes: Conjunctiva clear, Lids normal. No discharge.   Respiratory: No distress.   Musculoskeletal: No gross swelling or deformities.  Tenderness over the anterior left knee with pain with passive range of motion.  She is really tender throughout, no specific point tenderness.  Moves ankle and foot without difficulty.  Normal sensation throughout the leg.  2+ DP pulses.    Integument: Warm, dry, no rashes or lesions  Neurologic: Alert and orientated x3. No focal deficits.  Psych: Normal mood and affect        LAB:  Labs Ordered and Resulted from Time of ED Arrival to Time of ED Departure - No data to display    RADIOLOGY:  XR Tibia and Fibula Left 2 Views   Final Result   IMPRESSION: No acute left tibia or fibula fracture or dislocation. There is an old, healed fracture of the distal left fibular shaft.      XR Knee Left 3 Views   Final Result   IMPRESSION: Normal joint spaces and  alignment. No fracture. There is a knee joint effusion..              I, Gabrielle Ybarra, am serving as a scribe to document services personally performed by Lizz Tanner PA-C based on my observation and the provider's statements to me. I, Lizz Tanner PA-C attest that Gabrielle Ybarra is acting in a scribe capacity, has observed my performance of the services and has documented them in accordance with my direction.    Lizz Tanner PA-C   Emergency Medicine             Lizz Tanner PA-C  08/31/23 2009

## 2023-09-01 NOTE — TELEPHONE ENCOUNTER
Called pt and informed her Dr. Martinez has reviewed her MRI scan of the lumbar spine-Dr. Martinez sees no reason for her symptoms on that imaging. Dr. Martinez agrees that seeing a neuromuscular physician is a good idea considering past history of CIDP diagnosis. The MRI is reassuring that she does not have recurrent CIDP.    Informed pt Dr. Martinez advised her to avoid urgency rooms or emergency dept visits for leg pain symptoms. As they aren't really going to be able to help much. It would be reasonable to go to ED at a hospital if she is having new and different symptoms or has an in injury from a fall    Informed pt per Dr. Martinez to keep her September appt with [facility] specialist and to follow up with her pcp before then if she has questions and she verbally understood.

## 2023-09-01 NOTE — DISCHARGE INSTRUCTIONS
Your x-rays are negative for any fracture.  There still may be some ligamentous or meniscal injury however at this time there are no indications for further work-up or management at this time.  Recommend rest, elevation, ice and Tylenol or ibuprofen as needed for pain and following up with your primary care doctor.

## 2023-09-02 ENCOUNTER — HEALTH MAINTENANCE LETTER (OUTPATIENT)
Age: 33
End: 2023-09-02

## 2023-09-04 ENCOUNTER — APPOINTMENT (OUTPATIENT)
Dept: GENERAL RADIOLOGY | Facility: CLINIC | Age: 33
End: 2023-09-04
Attending: EMERGENCY MEDICINE
Payer: COMMERCIAL

## 2023-09-04 ENCOUNTER — HOSPITAL ENCOUNTER (EMERGENCY)
Facility: CLINIC | Age: 33
Discharge: NURSING, REHAB OR LONG-TERM CARE | End: 2023-09-05
Attending: EMERGENCY MEDICINE | Admitting: EMERGENCY MEDICINE
Payer: COMMERCIAL

## 2023-09-04 DIAGNOSIS — T18.5XXA FOREIGN BODY OF RECTUM, INITIAL ENCOUNTER: ICD-10-CM

## 2023-09-04 LAB
ANION GAP SERPL CALCULATED.3IONS-SCNC: 12 MMOL/L (ref 7–15)
BASOPHILS # BLD AUTO: 0 10E3/UL (ref 0–0.2)
BASOPHILS NFR BLD AUTO: 0 %
BUN SERPL-MCNC: 12.7 MG/DL (ref 6–20)
CALCIUM SERPL-MCNC: 9.6 MG/DL (ref 8.6–10)
CHLORIDE SERPL-SCNC: 108 MMOL/L (ref 98–107)
CREAT SERPL-MCNC: 0.64 MG/DL (ref 0.51–0.95)
DEPRECATED HCO3 PLAS-SCNC: 23 MMOL/L (ref 22–29)
EOSINOPHIL # BLD AUTO: 0.2 10E3/UL (ref 0–0.7)
EOSINOPHIL NFR BLD AUTO: 2 %
ERYTHROCYTE [DISTWIDTH] IN BLOOD BY AUTOMATED COUNT: 13.2 % (ref 10–15)
GFR SERPL CREATININE-BSD FRML MDRD: >90 ML/MIN/1.73M2
GLUCOSE SERPL-MCNC: 124 MG/DL (ref 70–99)
HCG SERPL QL: NEGATIVE
HCT VFR BLD AUTO: 38.3 % (ref 35–47)
HGB BLD-MCNC: 13 G/DL (ref 11.7–15.7)
IMM GRANULOCYTES # BLD: 0 10E3/UL
IMM GRANULOCYTES NFR BLD: 0 %
LYMPHOCYTES # BLD AUTO: 1.7 10E3/UL (ref 0.8–5.3)
LYMPHOCYTES NFR BLD AUTO: 17 %
MCH RBC QN AUTO: 30 PG (ref 26.5–33)
MCHC RBC AUTO-ENTMCNC: 33.9 G/DL (ref 31.5–36.5)
MCV RBC AUTO: 89 FL (ref 78–100)
MONOCYTES # BLD AUTO: 0.7 10E3/UL (ref 0–1.3)
MONOCYTES NFR BLD AUTO: 7 %
NEUTROPHILS # BLD AUTO: 7.6 10E3/UL (ref 1.6–8.3)
NEUTROPHILS NFR BLD AUTO: 74 %
NRBC # BLD AUTO: 0 10E3/UL
NRBC BLD AUTO-RTO: 0 /100
PLATELET # BLD AUTO: 296 10E3/UL (ref 150–450)
POTASSIUM SERPL-SCNC: 4.1 MMOL/L (ref 3.4–5.3)
RBC # BLD AUTO: 4.33 10E6/UL (ref 3.8–5.2)
SODIUM SERPL-SCNC: 143 MMOL/L (ref 136–145)
WBC # BLD AUTO: 10.3 10E3/UL (ref 4–11)

## 2023-09-04 PROCEDURE — 96374 THER/PROPH/DIAG INJ IV PUSH: CPT

## 2023-09-04 PROCEDURE — 84703 CHORIONIC GONADOTROPIN ASSAY: CPT | Performed by: EMERGENCY MEDICINE

## 2023-09-04 PROCEDURE — 96375 TX/PRO/DX INJ NEW DRUG ADDON: CPT

## 2023-09-04 PROCEDURE — 85025 COMPLETE CBC W/AUTO DIFF WBC: CPT | Performed by: EMERGENCY MEDICINE

## 2023-09-04 PROCEDURE — 74019 RADEX ABDOMEN 2 VIEWS: CPT

## 2023-09-04 PROCEDURE — 36415 COLL VENOUS BLD VENIPUNCTURE: CPT | Performed by: EMERGENCY MEDICINE

## 2023-09-04 PROCEDURE — 96376 TX/PRO/DX INJ SAME DRUG ADON: CPT

## 2023-09-04 PROCEDURE — 250N000011 HC RX IP 250 OP 636: Mod: JZ | Performed by: EMERGENCY MEDICINE

## 2023-09-04 PROCEDURE — 82310 ASSAY OF CALCIUM: CPT | Performed by: EMERGENCY MEDICINE

## 2023-09-04 RX ORDER — ONDANSETRON 2 MG/ML
4 INJECTION INTRAMUSCULAR; INTRAVENOUS ONCE
Status: COMPLETED | OUTPATIENT
Start: 2023-09-04 | End: 2023-09-04

## 2023-09-04 RX ORDER — HYDROMORPHONE HYDROCHLORIDE 1 MG/ML
0.5 INJECTION, SOLUTION INTRAMUSCULAR; INTRAVENOUS; SUBCUTANEOUS
Status: COMPLETED | OUTPATIENT
Start: 2023-09-04 | End: 2023-09-04

## 2023-09-04 RX ADMIN — ONDANSETRON 4 MG: 2 INJECTION INTRAMUSCULAR; INTRAVENOUS at 23:02

## 2023-09-04 RX ADMIN — HYDROMORPHONE HYDROCHLORIDE 0.5 MG: 1 INJECTION, SOLUTION INTRAMUSCULAR; INTRAVENOUS; SUBCUTANEOUS at 23:55

## 2023-09-04 RX ADMIN — HYDROMORPHONE HYDROCHLORIDE 0.5 MG: 1 INJECTION, SOLUTION INTRAMUSCULAR; INTRAVENOUS; SUBCUTANEOUS at 22:18

## 2023-09-04 ASSESSMENT — ACTIVITIES OF DAILY LIVING (ADL)
ADLS_ACUITY_SCORE: 40
ADLS_ACUITY_SCORE: 40

## 2023-09-05 ENCOUNTER — HOSPITAL ENCOUNTER (OUTPATIENT)
Dept: RADIOLOGY | Facility: CLINIC | Age: 33
Discharge: HOME | End: 2023-09-05
Admitting: PHYSICIAN ASSISTANT
Payer: COMMERCIAL

## 2023-09-05 VITALS
DIASTOLIC BLOOD PRESSURE: 86 MMHG | BODY MASS INDEX: 56.97 KG/M2 | WEIGHT: 293 LBS | HEART RATE: 83 BPM | SYSTOLIC BLOOD PRESSURE: 137 MMHG | TEMPERATURE: 98.2 F | RESPIRATION RATE: 18 BRPM | OXYGEN SATURATION: 95 %

## 2023-09-05 DIAGNOSIS — M79.662 PAIN OF LEFT LOWER LEG: ICD-10-CM

## 2023-09-05 PROCEDURE — 99285 EMERGENCY DEPT VISIT HI MDM: CPT | Performed by: EMERGENCY MEDICINE

## 2023-09-05 PROCEDURE — 99285 EMERGENCY DEPT VISIT HI MDM: CPT | Mod: 25

## 2023-09-05 PROCEDURE — 96375 TX/PRO/DX INJ NEW DRUG ADDON: CPT

## 2023-09-05 PROCEDURE — 96376 TX/PRO/DX INJ SAME DRUG ADON: CPT

## 2023-09-05 PROCEDURE — 73620 X-RAY EXAM OF FOOT: CPT | Mod: LT

## 2023-09-05 PROCEDURE — 250N000011 HC RX IP 250 OP 636

## 2023-09-05 PROCEDURE — 250N000013 HC RX MED GY IP 250 OP 250 PS 637: Performed by: EMERGENCY MEDICINE

## 2023-09-05 PROCEDURE — 250N000011 HC RX IP 250 OP 636: Mod: JZ | Performed by: EMERGENCY MEDICINE

## 2023-09-05 PROCEDURE — 99283 EMERGENCY DEPT VISIT LOW MDM: CPT | Mod: GC | Performed by: COLON & RECTAL SURGERY

## 2023-09-05 PROCEDURE — 96374 THER/PROPH/DIAG INJ IV PUSH: CPT

## 2023-09-05 PROCEDURE — 250N000013 HC RX MED GY IP 250 OP 250 PS 637

## 2023-09-05 RX ORDER — ONDANSETRON 2 MG/ML
4 INJECTION INTRAMUSCULAR; INTRAVENOUS ONCE
Status: COMPLETED | OUTPATIENT
Start: 2023-09-05 | End: 2023-09-05

## 2023-09-05 RX ORDER — FENTANYL CITRATE 50 UG/ML
50 INJECTION, SOLUTION INTRAMUSCULAR; INTRAVENOUS ONCE
Status: COMPLETED | OUTPATIENT
Start: 2023-09-05 | End: 2023-09-05

## 2023-09-05 RX ORDER — DIPHENHYDRAMINE HCL 50 MG
50 CAPSULE ORAL ONCE
Status: COMPLETED | OUTPATIENT
Start: 2023-09-05 | End: 2023-09-05

## 2023-09-05 RX ORDER — POLYETHYLENE GLYCOL 3350 17 G/17G
17 POWDER, FOR SOLUTION ORAL 4 TIMES DAILY
Status: DISCONTINUED | OUTPATIENT
Start: 2023-09-05 | End: 2023-09-05 | Stop reason: HOSPADM

## 2023-09-05 RX ORDER — POLYETHYLENE GLYCOL 3350 17 G/17G
POWDER, FOR SOLUTION ORAL
Qty: 527 G | Refills: 0 | Status: SHIPPED | OUTPATIENT
Start: 2023-09-05 | End: 2023-09-05

## 2023-09-05 RX ORDER — FENTANYL CITRATE 50 UG/ML
50 INJECTION, SOLUTION INTRAMUSCULAR; INTRAVENOUS
Status: COMPLETED | OUTPATIENT
Start: 2023-09-05 | End: 2023-09-05

## 2023-09-05 RX ORDER — TRAMADOL HYDROCHLORIDE 50 MG/1
50 TABLET ORAL EVERY 6 HOURS PRN
Qty: 4 TABLET | Refills: 0 | Status: SHIPPED | OUTPATIENT
Start: 2023-09-05 | End: 2023-09-05

## 2023-09-05 RX ORDER — POLYETHYLENE GLYCOL 3350 17 G/17G
17 POWDER, FOR SOLUTION ORAL ONCE
Status: COMPLETED | OUTPATIENT
Start: 2023-09-05 | End: 2023-09-05

## 2023-09-05 RX ORDER — TRAMADOL HYDROCHLORIDE 50 MG/1
50 TABLET ORAL ONCE
Status: COMPLETED | OUTPATIENT
Start: 2023-09-05 | End: 2023-09-05

## 2023-09-05 RX ORDER — MAGNESIUM CARB/ALUMINUM HYDROX 105-160MG
296 TABLET,CHEWABLE ORAL ONCE
Status: DISCONTINUED | OUTPATIENT
Start: 2023-09-05 | End: 2023-09-05

## 2023-09-05 RX ORDER — MAGNESIUM CARB/ALUMINUM HYDROX 105-160MG
296 TABLET,CHEWABLE ORAL ONCE
Status: COMPLETED | OUTPATIENT
Start: 2023-09-05 | End: 2023-09-05

## 2023-09-05 RX ORDER — MORPHINE SULFATE 4 MG/ML
4 INJECTION, SOLUTION INTRAMUSCULAR; INTRAVENOUS ONCE
Status: COMPLETED | OUTPATIENT
Start: 2023-09-05 | End: 2023-09-05

## 2023-09-05 RX ADMIN — POLYETHYLENE GLYCOL 3350 17 G: 17 POWDER, FOR SOLUTION ORAL at 05:53

## 2023-09-05 RX ADMIN — MORPHINE SULFATE 4 MG: 4 INJECTION INTRAVENOUS at 04:08

## 2023-09-05 RX ADMIN — FENTANYL CITRATE 50 MCG: 50 INJECTION, SOLUTION INTRAMUSCULAR; INTRAVENOUS at 01:38

## 2023-09-05 RX ADMIN — DIPHENHYDRAMINE HYDROCHLORIDE 50 MG: 50 CAPSULE ORAL at 00:26

## 2023-09-05 RX ADMIN — TRAMADOL HYDROCHLORIDE 50 MG: 50 TABLET, COATED ORAL at 05:54

## 2023-09-05 RX ADMIN — FENTANYL CITRATE 50 MCG: 50 INJECTION, SOLUTION INTRAMUSCULAR; INTRAVENOUS at 03:04

## 2023-09-05 RX ADMIN — Medication 296 ML: at 04:05

## 2023-09-05 RX ADMIN — ONDANSETRON 4 MG: 2 INJECTION INTRAMUSCULAR; INTRAVENOUS at 06:07

## 2023-09-05 ASSESSMENT — ACTIVITIES OF DAILY LIVING (ADL)
ADLS_ACUITY_SCORE: 40

## 2023-09-05 NOTE — PROGRESS NOTES
Please see excellent note from Dr. Cummins for full details     In brief, this is a 32 year old female with a PMH of borderline personality disorder who lives in a group home, and history of almost monthly foreign body insertions into esophagus and rectum, prior ex lap in 2017 for foreign body removal with Dr. Alejandro Garcia who presents with concerns for insertion of a beard oralia into the rectum. Patient is not passing gas. On Exam VSS, labs within normal limits and she is not peritoneal on exam.  This was confirmed on Xray to be a roughly 9 x 2 cm cylindrical object. No pneumoperitoneum was noted. Patient showed me a picture of the object which appears pink, cylindrical beard oralia with a circular head without sharp edges. There is a cap on the object. It is a battery operated. We discussed attempt at bedside removal.       Assent was obtained to perform a digital rectal exam and anoscopy. A female shaperone, Ms. Thais East, was present in the room for this entire examination. The patient was placed in the right lateral decubitus position. 50 Mcg of Fentanyl was given. BP and O2 monitors were placed. She said that she could not be in prone position given ACL injury and knee pain. On LACY, I could feel the bottom of the object at the tip of my index finger (~ 9 cm from intersphincteric groove). I attempted placement of two fingers but that actually made the object even more difficult to grab. I asked the patient to Valsalva which helped push the object down a little but I was still unable to get around the object to remove it. There was some expression of loose stools. I then attempted to pass a 25 Fr Hazel catheter around the object but I was still unsuccessful in getting around it. Finally, a lubricated anoscopy was placed through the anus but the object was unfortunately not visible at all with this maneuver. There was no blood noted and mucosa appeared healthy. The Hazel catheter was attempted to be placed  through the scope, but I again could not pass the catheter around the object. The patient tolerated the procedure fairly well.      At this time we recommend:   - Please give a witnessed dose of Miralax and Mag citrate in the ER   - OK to discharge the patient with instructions to take Miralax 4-5 times a day   - If object does not pass in 24 hours, patient should represent to the ER for evaluation and colorectal surgery should be contacted for EUA    Discussed with Dr. Jesus Nayak MD on 9/5/2023 at 3:58 AM

## 2023-09-05 NOTE — PROGRESS NOTES
Brief GI note:     Called by ED for patient with foreign body in rectum. Per provider, patient inserted electric razor with rotary blades into rectum around 7pm tonight. Patient reports the razor was not on and has plastic guard over the blades. Currently requiring IV pain meds for discomfort. No concerns for perforation on x-ray.     Given the characteristic and size of the object, suspect we may have difficulty with removal with our endoscopic tools. It would be worthwhile to discuss the case with colorectal surgery to see if anoscopy/transanal may be better approach for removal.      - Recommend transfer to [place] for possible procedure need  - Keep patient NPO   - Patient can try to bear down and expel object   - Recommend discussion with colorectal surgery to see if direct visualization with anoscopy and possibly some more rigid tools may be more successful at removal attempts.   - If colorectal surgery unable to remove object, we can attempt flexible sigmoidoscopy.       Discussed with Dr. Ulloa.     Jacob Crews MD  GI fellow

## 2023-09-05 NOTE — PROGRESS NOTES
Patient discharged to group home. Legal guardian Aaliyah Martell notified of discharge and discharge plan to have EMS transport patient to group home. Discharge summary and duplicate sent with patient. No new medications or prescriptions.

## 2023-09-05 NOTE — ED NOTES
Bed: [facility]  Expected date:   Expected time:   Means of arrival:   Comments:  [facility] TO TRIAGE

## 2023-09-05 NOTE — CONSULTS
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 1,000 mg by mouth daily (with breakfast)      montelukast (SINGULAIR) 10 MG tablet Take 10 mg by mouth every evening      omeprazole (PRILOSEC) 40 MG DR capsule Take 1 capsule (40 mg) by mouth daily 90 capsule 3    ondansetron  (ZOFRAN-ODT) 4 MG ODT tab Take 1 tablet (4 mg) by mouth every 8 hours as needed for nausea 15 tablet 0    pregabalin (LYRICA) 100 MG capsule Take 1 capsule (100 mg) by mouth 3 times daily 90 capsule 0    Respiratory Therapy Supplies (NEBULIZER) BRENDAN Nebulizer device.  Albuterol nebulization every 2 hours as needed for shortness of breath or wheezing. 1 each 0    saline nasal (AYR SALINE) GEL topical gel Apply into each nare 2 times daily Place in front of each side of your nose and breathe in to distribute gel twice daily. 14.1 g 11    Semaglutide 3 MG TABS Take 3 mg by mouth daily before breakfast Then 7mg daily for second month. Then 14 mg daily      sodium chloride (OCEAN) 0.65 % nasal spray Spray 2 sprays in each side of the nose every 3 hours while awake. 44 mL 11    SUMAtriptan (IMITREX) 25 MG tablet Take 25 mg by mouth at onset of headache for migraine May repeat in 2 hours.      valACYclovir (VALTREX) 1000 mg tablet Take 2,000 mg by mouth 2 times daily as needed Take 2 tablets by mouth two times daily for one day. Use as needed at onset of cold sore.            Review of Systems    The 10 point Review of Systems is negative other than noted in the HPI or here.    Social History   I have reviewed this patient's social history and updated it with pertinent information if needed.  Social History     Tobacco Use    Smoking status: Never    Smokeless tobacco: Never   Substance Use Topics    Alcohol use: No     Alcohol/week: 0.0 standard drinks of alcohol    Drug use: No         Family History   I have reviewed this patient's family history and updated it with pertinent information if needed.  Family History   Problem Relation Age of Onset    Diabetes Type 2  Maternal Grandmother     Diabetes Type 2  Paternal Grandmother     Breast Cancer Paternal Grandmother     Cerebrovascular Disease Father 53    Myocardial Infarction No family hx of     Coronary Artery Disease Early Onset No family hx of     Ovarian Cancer No  family hx of     Colon Cancer No family hx of     Depression Mother     Anxiety Disorder Mother          Allergies   Allergies   Allergen Reactions    Amoxicillin-Pot Clavulanate Other (See Comments), Swelling and Rash     PN: facial swelling, left side. Also had cortisone injection the same day as she started the Augmentin.  Noted in downtime recovery of chart.    PN: facial swelling, left side. Also had cortisone injection the same day as she started the Augmentin.; HUT Comment: PN: facial swelling, left side. Also had cortisone injection the same day as she started the Augmentin.Noted in downtime recovery of chart.; HUT Reaction: Rash; HUT Reaction: Unknown; HUT Reaction Type: Allergy; HUT Severity: Med; HUT Noted: [id number]  PN: facial swelling, left side. Also had cortisone injection the same day as she started the Augmentin.  Other reaction(s): *Unknown  PN: facial swelling, left side. Also had cortisone injection the same day as she started the Augmentin.  Noted in downtime recovery of chart.  PN: facial swelling, left side. Also had cortisone injection the same day as she started the Augmentin.  Other reaction(s): Facial swelling  Other reaction(s): Facial swelling    Hydrocodone Nausea and Vomiting and GI Disturbance     vomiting for days, PN: vomiting for days; HUT Comment: vomiting for days; HUT Reaction: Gastrointestinal; HUT Reaction: Nausea And Vomiting; HUT Reaction Type: Intolerance; HUT Severity: Med; HUT Noted: [id number]  vomiting for days    Other reaction(s): Rash    Hydrocodone-Acetaminophen Nausea and Vomiting and Rash     Update on 12/12  Pt says she can take tylenol just not the hydrocodone.   Other reaction(s): Rash      Influenza Vaccines Other (See Comments) and Nausea and Vomiting     HUT Reaction: Nausea And Vomiting; HUT Reaction Type: Intolerance; HUT Severity: Low; HUT Noted: [id number]    Latex Rash     HUT Reaction: Rash; HUT Reaction Type: Allergy; HUT Severity: Low; HUT Noted:  [id number]  Other reaction(s): Rash      Oseltamivir Hives     med stopped, PN: med stopped  med stopped, PN: med stopped; HUT Comment: med stopped, PN: med stopped; HUT Reaction: Hives; HUT Reaction Type: Allergy; HUT Severity: Med; HUT Noted: [id number]    Penicillins Anaphylaxis     HUT Reaction: Anaphylaxis; HUT Reaction Type: Allergy; HUT Severity: High; HUT Noted: [id number]    Vancomycin Itching, Swelling and Rash     Other reaction(s): Redness  Flushed face, very itchy; HUT Comment: Flushed face, very itchy; HUT Reaction: Angioedema; HUT Reaction: Redness; HUT Severity: Med; HUT Noted: [id number]    facial    Blood-Group Specific Substance Other (See Comments)     Patient has an anti-Cw and non-specific antibodies. Blood product orders may be delayed. Draw one red top and two purple top tubes for all type/screen/crossmatch orders.  Patient has anti-Cw, anti-K ([place]), Warm auto and nonspecific antibodies. Blood products may be delayed. Draw patient 24 hours prior to transfusion. Draw one red top and two purple top tubes for all type and screen orders.    Clavulanic Acid Angioedema    Fentanyl Itching    Haemophilus B Polysaccharide Vaccine Nausea and Vomiting    Naltrexone Other (See Comments)     Reaction(s): Vivid dreams.    Other Drug Allergy (See Comments)      See original file MRN [id number]. Files are marked for merge    Oxycodone Swelling    Adhesive Tape Rash     Silicone type  Silicone type    Other reaction(s): adhesive allergy  Other reaction(s): adhesive allergy  Silicone type    Other reaction(s): adhesive allergy      Band-Aid Anti-Itch      Other reaction(s): adhesive allergy    Cephalosporins Rash    Lamotrigine Rash     Possibly associated with Lamictal.   HUT Comment: Possibly associated with Lamictal. ; HUT Reaction: Rash; HUT Reaction Type: Allergy; HUT Severity: Low; HUT Noted: [id number]    No Clinical Screening - See Comments Rash and Other (See Comments)     Silicone type  Silicone type  See  original file MRN [id number]. Files are marked for merge  History of swallowing sharp metallic objects. She should not be prescribed lancets due to posed risk of swallowing.         Physical Exam   Vitals: Most Recent  Ranges (Last 24 hours)   Temp: 98.1  F (36.7  C)  Pulse: 97  BP: 132/74  Resp: 16  SpO2: 97 %  O2 Device: None (Room air) Temp  Min: 98.1  F (36.7  C)  Max: 98.1  F (36.7  C)  Pulse  Min: 92  Max: 97  BP  Min: 123/77  Max: 132/74  Resp  Min: 16  Max: 16  SpO2  Min: 97 %  Max: 100 %     Physical Exam:  Gen: Appears stated age, NAD, BMI >50  HEENT: NC/AT, PERRL, EOMI, Sclera Anicteric, Hearing intact  Neuro: AO, no focal deficits  Psych: Affect appropriate, Behavior appropriate, Cooperative  CV: Hypertensive, Normocardic  Pulm: NWOB on RA  ABD: Soft, NT diffusely. Mild focal TTP in RLQ. Not peritonitic. No guarding or rebound tenderness. Midline vertical scare well healed.  Anal: LACY and Anoscopy without successful palpation or visualization of the object. No signs of perforation (bleeding, discharge).   MSK: MAEx4, warm, PPP        Data     I have personally reviewed the following data over the past 24 hrs:    10.3  \   13.0   / 296     143 108 (H) 12.7 /  124 (H)   4.1 23 0.64 \       Imaging results reviewed over the past 24 hrs:   Recent Results (from the past 24 hour(s))   Abdomen XR, 2 vw, flat and upright    Narrative    EXAM: ABDOMEN 2 VIEWS  LOCATION: [facility]  DATE: 9/4/2023    INDICATION: Foreign body in the rectum.  COMPARISON: None.    FINDINGS: An oblong radiopaque foreign object containing central metallic density is present projecting over the midline lower pelvis and measuring approximately 8.8 x 2.2 cm. No visualized dilatation of the small or large bowel. No visualized bowel wall   thickening, pneumatosis or free intraperitoneal gas. Surgical clips in the upper right hemiabdomen likely relate to prior cholecystectomy.      Impression     IMPRESSION:   1. 9 x 2 cm foreign object projecting over the midline lower pelvis, consistent with a foreign body in the mid rectum as suspected in the clinical history.  2. No evidence of bowel obstruction.

## 2023-09-05 NOTE — ED NOTES
Pt here from [facility] in secure room. Dilaudid x3 zofran x1  She does not want dilaudid for pain it makes her itchy   18g RFA

## 2023-09-05 NOTE — ED PROVIDER NOTES
ED Provider Note  [facility]      History     Chief Complaint   Patient presents with    Foreign Body in Rectum     Onset tonight at 7 pm with anxiety, inserted electric beard oralia in rectum, given 100 mcs of fentanyl intranasal, continues with low back pain, from [facility].     HPI  Nevin Alvarado is a 32 year old female with a history of depression, anxiety, self-injurious behaviors and foreign body ingestion who presents to the emergency department with rectal foreign body.  She reports today she was feeling increasingly anxious.  She denies any known triggers.  She was talking with the crisis line and while talking with the crisis line she inserted an electric peer tremor upper rectum.  This was at approximately 7 PM.  The oralia was not on at the time.  She reports there was a cover on the blades.  She does report having lower abdominal and low back pain.  Denies any rectal bleeding.  She denies that this was an intent to harm herself but could not control her anxiety.  She denies any other ingestions.      Physical Exam   BP: 123/77  Pulse: 92  Temp: 98.1  F (36.7  C)  Resp: 16  Weight: 141.3 kg (311 lb 8 oz)  SpO2: 100 %  Physical Exam  General: patient is alert and oriented, anxious appearing   Cardiovascular: regular rate and rhythm, extremities warm and well perfused, no lower extremity edema  Pulmonary: lungs clear to auscultation bilaterally   Abdomen: soft, tenderness to palpation in the lower abdomen, no rebound or guarding  Rectal: No blood noted at the rectum, unable to palpate any foreign body on digital rectal exam  Musculoskeletal: normal range of motion   Neurological: alert and oriented, moving all extremities symmetrically  Skin: warm, dry       ED Course, Procedures, & Data      Procedures                   Results for orders placed or performed during the hospital encounter of 09/04/23   Abdomen XR, 2 vw, flat and upright     Status: None    Narrative     EXAM: ABDOMEN 2 VIEWS  LOCATION: [facility]  DATE: 9/4/2023    INDICATION: Foreign body in the rectum.  COMPARISON: None.    FINDINGS: An oblong radiopaque foreign object containing central metallic density is present projecting over the midline lower pelvis and measuring approximately 8.8 x 2.2 cm. No visualized dilatation of the small or large bowel. No visualized bowel wall   thickening, pneumatosis or free intraperitoneal gas. Surgical clips in the upper right hemiabdomen likely relate to prior cholecystectomy.      Impression    IMPRESSION:   1. 9 x 2 cm foreign object projecting over the midline lower pelvis, consistent with a foreign body in the mid rectum as suspected in the clinical history.  2. No evidence of bowel obstruction.    Basic metabolic panel     Status: Abnormal   Result Value Ref Range    Sodium 143 136 - 145 mmol/L    Potassium 4.1 3.4 - 5.3 mmol/L    Chloride 108 (H) 98 - 107 mmol/L    Carbon Dioxide (CO2) 23 22 - 29 mmol/L    Anion Gap 12 7 - 15 mmol/L    Urea Nitrogen 12.7 6.0 - 20.0 mg/dL    Creatinine 0.64 0.51 - 0.95 mg/dL    Calcium 9.6 8.6 - 10.0 mg/dL    Glucose 124 (H) 70 - 99 mg/dL    GFR Estimate >90 >60 mL/min/1.73m2   HCG qualitative Blood     Status: Normal   Result Value Ref Range    hCG Serum Qualitative Negative Negative   CBC with platelets and differential     Status: None   Result Value Ref Range    WBC Count 10.3 4.0 - 11.0 10e3/uL    RBC Count 4.33 3.80 - 5.20 10e6/uL    Hemoglobin 13.0 11.7 - 15.7 g/dL    Hematocrit 38.3 35.0 - 47.0 %    MCV 89 78 - 100 fL    MCH 30.0 26.5 - 33.0 pg    MCHC 33.9 31.5 - 36.5 g/dL    RDW 13.2 10.0 - 15.0 %    Platelet Count 296 150 - 450 10e3/uL    % Neutrophils 74 %    % Lymphocytes 17 %    % Monocytes 7 %    % Eosinophils 2 %    % Basophils 0 %    % Immature Granulocytes 0 %    NRBCs per 100 WBC 0 <1 /100    Absolute Neutrophils 7.6 1.6 - 8.3 10e3/uL    Absolute Lymphocytes 1.7 0.8 - 5.3  10e3/uL    Absolute Monocytes 0.7 0.0 - 1.3 10e3/uL    Absolute Eosinophils 0.2 0.0 - 0.7 10e3/uL    Absolute Basophils 0.0 0.0 - 0.2 10e3/uL    Absolute Immature Granulocytes 0.0 <=0.4 10e3/uL    Absolute NRBCs 0.0 10e3/uL   CBC with platelets differential     Status: None    Narrative    The following orders were created for panel order CBC with platelets differential.  Procedure                               Abnormality         Status                     ---------                               -----------         ------                     CBC with platelets and d...[[id number]]                      Final result                 Please view results for these tests on the individual orders.     Medications   morphine (PF) injection 4 mg (has no administration in time range)   HYDROmorphone (PF) (DILAUDID) injection 0.5 mg (0.5 mg Intravenous $Given 9/4/23 0568)   ondansetron (ZOFRAN) injection 4 mg (4 mg Intravenous $Given 9/4/23 2302)   HYDROmorphone (PF) (DILAUDID) injection 0.5 mg (0.5 mg Intravenous $Given 9/4/23 7865)   diphenhydrAMINE (BENADRYL) capsule 50 mg (50 mg Oral $Given 9/5/23 0026)   fentaNYL (PF) (SUBLIMAZE) injection 50 mcg (50 mcg Intravenous $Given 9/5/23 0138)     Labs Ordered and Resulted from Time of ED Arrival to Time of ED Departure   BASIC METABOLIC PANEL - Abnormal       Result Value    Sodium 143      Potassium 4.1      Chloride 108 (*)     Carbon Dioxide (CO2) 23      Anion Gap 12      Urea Nitrogen 12.7      Creatinine 0.64      Calcium 9.6      Glucose 124 (*)     GFR Estimate >90     HCG QUALITATIVE PREGNANCY - Normal    hCG Serum Qualitative Negative     CBC WITH PLATELETS AND DIFFERENTIAL    WBC Count 10.3      RBC Count 4.33      Hemoglobin 13.0      Hematocrit 38.3      MCV 89      MCH 30.0      MCHC 33.9      RDW 13.2      Platelet Count 296      % Neutrophils 74      % Lymphocytes 17      % Monocytes 7      % Eosinophils 2      % Basophils 0      % Immature Granulocytes 0       NRBCs per 100 WBC 0      Absolute Neutrophils 7.6      Absolute Lymphocytes 1.7      Absolute Monocytes 0.7      Absolute Eosinophils 0.2      Absolute Basophils 0.0      Absolute Immature Granulocytes 0.0      Absolute NRBCs 0.0       Abdomen XR, 2 vw, flat and upright   Final Result   IMPRESSION:    1. 9 x 2 cm foreign object projecting over the midline lower pelvis, consistent with a foreign body in the mid rectum as suspected in the clinical history.   2. No evidence of bowel obstruction.              Critical care was not performed.     Medical Decision Making  The patient's presentation was of high complexity (an acute health issue posing potential threat to life or bodily function).    The patient's evaluation involved:  review of external note(s) from 1 sources (ED notes)  ordering and/or review of 3+ test(s) in this encounter (see separate area of note for details)  discussion of management or test interpretation with another health professional (GI, colorectal surgery)    The patient's management necessitated moderate risk (prescription drug management including medications given in the ED), high risk (a parenteral controlled substance), and high risk (a decision regarding emergency major procedure (possible OR evaluation for FB)).    Assessment & Plan    Ms. Alvarado is a 32 year old female with a history of depression, anxiety, self-injurious behaviors and foreign body ingestion who presents to the emergency department with rectal foreign body.  She is noted to be mildly hypertensive otherwise hemodynamically stable, afebrile and in no respiratory distress.  On exam she does have abdominal tenderness but is not peritoneal.  Abdominal x-ray was obtained which does show the presence of a rectal foreign body.  No evidence of rectal bleeding on exam.  Laboratory evaluation is unremarkable.  Discussed with GI and unsure if the appropriate equipment is available to them for removal.  Recommended colorectal  surgery consultation.  Discussed with colorectal staff and would require transfer to the [facility].  She will be transferred to the [facility] ED for colorectal consultation versus GI intervention.  She denies any suicidal ideation or that this was an attempt to harm herself.  She was given hydromorphone in the ED and on second dose developed some itching.  She was given oral Benadryl.  No other signs or symptoms of anaphylactic reaction.  Discussed with surgery resident and transferred to the [facility] emergency department for colorectal consultation.    I have reviewed the nursing notes. I have reviewed the findings, diagnosis, plan and need for follow up with the patient.    New Prescriptions    No medications on file           Rhina Reyes MD  [facility] EMERGENCY DEPARTMENT  9/4/2023     Rhina Reyes MD  09/05/23 0207

## 2023-09-05 NOTE — ED NOTES
Patient was excepted in signout from the [facility] emergency physician pending colorectal surgery consultation.  Colorectal surgery did evaluate the patient, as did the fellow, staff or the attending.  Unfortunately, they were unsuccessful at removing the foreign body at the bedside.  The recommendation by the colorectal attending is for the patient to take MiraLAX or other laxative 4-5 times per day over the next day, return 24 hours of the object does not pass.  She should maintain a clear liquid diet until that time.  She did request that the object be removed, though the colorectal staff felt that the above action plan was the most appropriate in this case.  This was discussed with the patient at length.  She is encouraged to return if she has any worsening or concerns.  She did request medication for pain as well.  I did prescribe tramadol, she has allergies to oxycodone and hydrocodone.  I did discuss with the pharmacist as I did get a flag for potential interaction with her serotonergic medication.  He felt that this was an extremely low risk of interaction given very short-term usage, and would be safe.  She was given a dose of magnesium citrate here, after discussing with colorectal, followed more than an hour later by a dose of MiraLAX.  I did offer to keep her here until morning to have her MiraLAX prescription filled here, but she states she will take to a [facility] and fill there.  She is encouraged to return in 24 hours if she does not pass the object, and to use MiraLAX another at least 4 times over the next day.  She does verbalize understanding.  I did call and speak with her guardian who is aware of the plan as well.  No sign of perforation.    Dictation Disclaimer: Some of this Note has been completed with voice-recognition dictation software. Although errors are generally corrected real-time, there is the potential for a rare error to be present in the completed chart.       Oliva Cabello,  MD  09/05/23 0615      ADDENDUM: I was told by the nurse prior to the patient leaving that she requested have a bowel movement.  She did pass the entire clipper device, intact.  She will be discharged at this time.  Per previous recommendations given by colorectal when they saw her, if she were to pass the device, she could resume a normal diet.  She may return with any concerns.    Dictation Disclaimer: Some of this Note has been completed with voice-recognition dictation software. Although errors are generally corrected real-time, there is the potential for a rare error to be present in the completed chart.       Oliva Cabello MD  09/05/23 6316

## 2023-09-12 NOTE — PROGRESS NOTES
"Video-Visit Details    Type of service:  Video Visit    Video Start Time: 12:59 PM   Video End Time: 1:55 PM     Originating Location (pt. Location): Home    Distant Location (provider location): Offsite (providers home) [facility] [place]     Platform used for Video Visit: [employer]    New Bariatric Nutrition Consultation Note    Reason For Visit: Nutrition Assessment    Nevin Alvarado is a 32 year old presenting today for new bariatric nutrition consult.  Provided the weight loss surgery nutrition class information during this visit.  Pt is interested in laparoscopic sleeve gastrectomy.  Patient is accompanied by self.  This is pt's first of required nutrition visits prior to surgery.     Pt referred by Sharon Toro NP on September 12, 2023.  CO-MORBIDITIES OF OBESITY INCLUDE:        9/12/2023    12:48 PM   --   I have the following health issues associated with obesity Type II Diabetes    High Blood Pressure    Sleep Apnea    Polycystic Ovarian Syndrome    GERD (Reflux)    Fatty Liver    Asthma    Stress Incontinence       SUPPORT:      9/12/2023    12:48 PM   Support System Reviewed With Patient   Who do you have in your support network that can be available to help you for the first 2 weeks after surgery? I live in a group home with 24 hour staff, mom   Who can you count on for support throughout your weight loss surgery journey? a friend, and my therapist       ANTHROPOMETRICS:  Initial Consult Weight: 309 lb on 9/13/23  Estimated body mass index is 56.5 kg/m  as calculated from the following:    Height as of an earlier encounter on 9/13/23: 1.575 m (5' 2.01\").    Weight as of an earlier encounter on 9/13/23: 140.2 kg (309 lb).    Required weight loss goal pre-op: -20 lbs from initial consult weight (goal weight 289 lbs or less before surgery)        9/12/2023    12:48 PM   --   I have tried the following methods to lose weight Watching portions or calories    Exercise    " Pre packaged meals ex: Nutrisystem    OTC Medications    Prescription Medications           9/12/2023    12:48 PM   Weight Loss Questions Reviewed With Patient   How long have you been overweight? Since late teens through early 20's       MEDICATION FOR WEIGHT LOSS:  topiramate- took in 2014 for mood- caused anorexia   Naltrexone - suicidal thoughts     Hx of self harm- swallowing thing- started after she was treated for anorexia when taking topiramate- in 6 months has only had one day of self harm- this was 2 weeks ago and instead of swallowing she inserted something per rectum. - Followed by MASON Potter PA-C, PE in 2019 after esophageal perforation repair     VITAMIN/MINERAL SUPPLEMENTS:  Iron     NUTRITION HISTORY:  Food allergies:NKFA  Food intolerances: None  Previous experience with diet modification for weight loss: Nutrisystem, prescription medications, exercise, watching calories or portions     Has been meal planning for the past 3 months. Likes chicken, turkey, shrimp.         9/12/2023    12:48 PM   Recall Diet Questions Reviewed With Patient   Describe what you typically consume for breakfast (typical or most recent) eggs and hash browns, homemade waffles, laith pudding with fruit   Describe what you typically consume for lunch (typical or most recent) homemade lunchables, some pasta or chicken   Describe what you typically consume for supper (typical or most recent) low calorie meal prep meals   Describe what you typically consume as snacks (typical or most recent) cheese sticks, fruit jerky, fruits/vegetables   How many ounces of water, or other low calorie drinks, do you drink daily (8 oz=1 glass)? 48 oz   How many ounces of caffeine (coffee, tea, pop) do you drink daily (8 oz=1 glass)? 16 oz   How many ounces of carbonated (pop, beer, sparkling water) drinks do you drinky daily (8 oz=1 glass)? 0 oz   How many ounces of juice, pop, sweet tea, sports drinks, protein drinks, other sweetened  drinks, do you drink daily (8 oz=1 glass)? 0 oz   How many ounces of milk do you drink daily (8 oz=1 glass) 0 oz   How often do you drink alcohol? Never           9/12/2023    12:48 PM   Eating Habits   What foods do you crave? ice cream, chocolate, sometimes just salty chips           9/12/2023    12:48 PM   Dining Out History Reviewed With Patient   How often do you dine out? Rarely.   Where do you dine out? (select all that apply) take out   What types of food do you order when you dine out? jitendra verdin     EXERCISE:        9/12/2023    12:48 PM   --   How often do you exercise? Less than 1 time per week   What is the duration of your exercise (in minutes)? 10 Minutes   What types of exercise do you do? other   What keeps you from being more active? Pain    My ability to walk or move around is limited    Shortness of breath    Too tired     NUTRITION DIAGNOSIS:  Obesity r/t long history of positive energy balance aeb BMI >30 kg/m2.    INTERVENTION:  Intervention Provided/Education Provided on post-op diet guidelines, vitamins/minerals essential post-operatively, GI anatomy of bariatric surgeries, ways to help prepare for post-op diet guidelines pre-operatively, portion/calorie-control, mindful eating and sources of protein.  Patient demonstrates understanding.     Personal barriers to making and continuing required life changes have been identified, and strategies to overcome those barriers have been recommended AND family and social supports have been assessed and strategies to strengthen those supports have been recommended.    Provided pt with list of goals, RD contact information and resources listed below via [employer].             9/12/2023    12:48 PM   Questions Reviewed With Patient   How ready are you to make changes regarding your weight? Number 1 = Not ready at all to make changes up to 10 = very ready. 10   How confident are you that you can change? 1 = Not confident that you will be successful making  changes up to 10 = very confident. 7     Expected Engagement: good    GOALS:  Relating To Eating:  - Eat slowly (20-30 minutes per meal), chewing foods well (25 chews per bite/applesauce consistency)  - Focus on eating smaller portion sizes at meals and snacks  -Aim to consume 60 grams of protein each (ex. 20 grams per meal)    Relating to beverages:  - Reduce caffeine/carbonation/calorie containing beverages  - Separate fluids from meals by 30 minutes before, during, and after eating  - Drink 48-64 ounces of fluid per day. Small, frequent sips between meals.    Relating to activity:  Increase activity as able    The Plate Method  [email or web address]    Protein Sources for Weight Loss  [email or web address]     Carbohydrates  [email or web address]     Mindful Eating  [email or web address]     Summary of Volumetrics Eating Plan  [email or web address]     Diet Guidelines after Weight Loss Surgery  [email or web address]     Seated Exercises for Arms and Legs (can be done before or after surgery)  [email or web address]    Follow Up: October 13.   Time spent with patient: 56 minutes.  Nevin Birmingham RD, LD

## 2023-09-13 ENCOUNTER — VIRTUAL VISIT (OUTPATIENT)
Dept: ENDOCRINOLOGY | Facility: CLINIC | Age: 33
End: 2023-09-13
Payer: COMMERCIAL

## 2023-09-13 VITALS — HEIGHT: 62 IN | BODY MASS INDEX: 53.92 KG/M2 | WEIGHT: 293 LBS

## 2023-09-13 DIAGNOSIS — E66.813 CLASS 3 SEVERE OBESITY WITH SERIOUS COMORBIDITY AND BODY MASS INDEX (BMI) OF 50.0 TO 59.9 IN ADULT, UNSPECIFIED OBESITY TYPE (H): ICD-10-CM

## 2023-09-13 DIAGNOSIS — Z71.3 NUTRITIONAL COUNSELING: Primary | ICD-10-CM

## 2023-09-13 DIAGNOSIS — E66.01 CLASS 3 SEVERE OBESITY WITH SERIOUS COMORBIDITY AND BODY MASS INDEX (BMI) OF 50.0 TO 59.9 IN ADULT, UNSPECIFIED OBESITY TYPE (H): ICD-10-CM

## 2023-09-13 DIAGNOSIS — E66.01 CLASS 3 SEVERE OBESITY WITH SERIOUS COMORBIDITY AND BODY MASS INDEX (BMI) OF 50.0 TO 59.9 IN ADULT, UNSPECIFIED OBESITY TYPE (H): Primary | ICD-10-CM

## 2023-09-13 DIAGNOSIS — E66.813 CLASS 3 SEVERE OBESITY WITH SERIOUS COMORBIDITY AND BODY MASS INDEX (BMI) OF 50.0 TO 59.9 IN ADULT, UNSPECIFIED OBESITY TYPE (H): Primary | ICD-10-CM

## 2023-09-13 DIAGNOSIS — R63.5 WEIGHT GAIN: ICD-10-CM

## 2023-09-13 PROCEDURE — 99207 PR NO CHARGE LOS: CPT | Mod: VID

## 2023-09-13 PROCEDURE — 97802 MEDICAL NUTRITION INDIV IN: CPT | Mod: VID

## 2023-09-13 PROCEDURE — 99215 OFFICE O/P EST HI 40 MIN: CPT | Mod: VID | Performed by: NURSE PRACTITIONER

## 2023-09-13 RX ORDER — CLONAZEPAM 0.5 MG/1
TABLET ORAL
Status: ON HOLD | COMMUNITY
Start: 2023-09-07 | End: 2024-04-16

## 2023-09-13 ASSESSMENT — PAIN SCALES - GENERAL: PAINLEVEL: MODERATE PAIN (5)

## 2023-09-13 NOTE — PROGRESS NOTES
Virtual Visit Details    Type of service:  Video Visit   Video Start Time: 1102  Video End Time:1207    Originating Location (pt. Location): Home    Distant Location (provider location):  Off-site  Platform used for Video Visit: [employer]

## 2023-09-13 NOTE — Clinical Note
Can we reach out to RN at group home? Nurse from Group Home- Nish Padilla - [phone]. Already taking oral semaglutide. Would like to switch to ozempic or mounjaro if covered by insurance. Would need to administer for her I suspect given risk for self harm and other meds are administered to her. They'd also need access to fridge for storage. If this is possible, I can reach out to legal guardian to discuss further. Thanks!

## 2023-09-13 NOTE — LETTER
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 FLEXIBLE;  flex sig with foreign body removal using snare and rattooth forcep;  Surgeon: Soham Cano MD;  Location: [facility]    SIGMOIDOSCOPY FLEXIBLE N/A 2/20/2019     Procedure: Exam under anesthesia Colonoscopy with attempted  removal of rectal foreign body;  Surgeon: Estrada Chávez MD;  Location:  OR       FAMILY HISTORY:   Family History   Problem Relation Age of Onset    Diabetes Type 2  Maternal Grandmother     Diabetes Type 2  Paternal Grandmother     Breast Cancer Paternal Grandmother     Cerebrovascular Disease Father 53    Myocardial Infarction No family hx of     Coronary Artery Disease Early Onset No family hx of     Ovarian Cancer No family hx of     Colon Cancer No family hx of     Depression Mother     Anxiety Disorder Mother        SOCIAL HISTORY:       9/12/2023    12:48 PM   Social History Questions Reviewed With Patient   Which best describes your employment status (select all that apply) I am disabled   Which best describes your marital status single   Who do you have in your support network that can be available to help you for the first 2 weeks after surgery? I live in a group home with 24 hour staff, mom   Who can you count on for support throughout your weight loss surgery journey? a friend, and my therapist       HABITS:      9/12/2023    12:48 PM   --   How often do you drink alcohol? Never   Do you currently use any of the following Nicotine products? No   Have you ever used any of the following nicotine products? No   Have you or are you currently using street drugs or prescription strength medication for which you do not have a prescription for? No   Do you have a history of chemical dependency (alcohol or drug abuse)? Yes     Currently taking narcotic/opioids No    PSYCHOLOGICAL HISTORY:       9/12/2023    12:48 PM   Psychological History Reviewed With Patient   Have you ever attempted suicide? More than 10 years ago.   Have you had thoughts of suicide in the past year? No   Have you ever been hospitalized for mental illness or a suicide attempt? In the last 5 to 10 years.   Do you have a history of chronic pain? Yes   Have you ever been diagnosed  with fibromyalgia? No   Are you currently being treated for any of the following? (select all that apply) Anxiety    I take medication or see a therapist for another mental illness   Are you currently seeing a therapist or counselor? Yes   Are you currently seeing a psychiatrist? Yes       ROS:      9/12/2023    12:48 PM   --   Skin Skin fold rashes (groin or other folds)    Leg swelling   HEENT Dizziness/lightheadedness   Musculoskeletal Joint Pain    Back pain    Limited mobility    Swelling of legs   Cardiovascular Shortness of breath with activity   Pulmonary Shortness of breath at rest    Shortness of breath with activity    Snoring    People have told me I stop breathing while asleep   Gastrointestinal Heartburn    Reflux    Diarrhea    Difficulty swallowing (food gets stuck)   Genitourinary Stress incontinence (losing urine when coughing, sneezing, etc.)   Hematological Pulmonary embolism (PE)   Neurological Migraine headaches   Female only Loss of menstrual cycles    Excessive menstrual bleeding    Irregular menstrual cycles    Polycystic ovarian syndrome (PCOS)    Birth control       EATING BEHAVIORS:      9/12/2023    12:48 PM   --   Have you or anyone else thought that you had an eating disorder? No   Do you currently binge eat (eat a large amount of food in a short time)? No   Are you an emotional eater? No   Do you get up to eat after falling asleep? No   Can you afford 3 meals a day? Yes   Can you afford 50-60 dollars a month for vitamins? No       EXERCISE:      9/12/2023    12:48 PM   --   How often do you exercise? Less than 1 time per week   What is the duration of your exercise (in minutes)? 10 Minutes   What types of exercise do you do? other   What keeps you from being more active? Pain    My ability to walk or move around is limited    Shortness of breath    Too tired       MEDICATIONS:  Current Outpatient Medications   Medication Sig Dispense Refill    clonazePAM (KLONOPIN) 0.5 MG tablet Take  0.5mg daily PRN anxiety- 20 tablets per month, try to keep it to 3-4x per week      albuterol (PROAIR HFA/PROVENTIL HFA/VENTOLIN HFA) 108 (90 Base) MCG/ACT inhaler Inhale 2 puffs into the lungs every 6 hours as needed for shortness of breath / dyspnea or wheezing 18 g 0    albuterol (PROVENTIL) (2.5 MG/3ML) 0.083% neb solution Take 2.5 mg by nebulization every 6 hours as needed for shortness of breath or wheezing      BANOPHEN 2-0.1 % external cream Apply 1 applicator topically 2 times daily as needed for itching      brexpiprazole (REXULTI) 2 MG tablet Take 2 mg by mouth every evening      cetirizine (ZYRTEC) 10 MG tablet Take 1 tablet (10 mg) by mouth daily (Patient taking differently: Take 10 mg by mouth every evening) 30 tablet 0    Cholecalciferol (D3 HIGH POTENCY) 25 MCG (1000 UT) CAPS Take 50 mcg by mouth daily      cyclobenzaprine (FLEXERIL) 10 MG tablet Take 0.5-1 tablets (5-10 mg) by mouth 3 times daily as needed for muscle spasms 20 tablet 0    desvenlafaxine (PRISTIQ) 100 MG 24 hr tablet Take 1 tablet (100 mg) by mouth daily (Patient taking differently: Take 50 mg by mouth daily) 30 tablet 0    ferrous sulfate (FEROSUL) 325 (65 Fe) MG tablet Take 1 tablet (325 mg) by mouth daily (with breakfast) 30 tablet 0    fluocinonide (LIDEX) 0.05 % external cream Apply 1 Application topically 2 times daily as needed Apply thinly to knee 2 times daily, Monday through Fridays, off on weekends as needed. Avoid face and skin folds.      furosemide (LASIX) 20 MG tablet Take 20 mg by mouth daily      gabapentin 8 % in PLO cream Apply 1 click (0.25 g) topically every 8 hours as needed for neuropathic pain (right thigh) 30 g 4    hydroxychloroquine (PLAQUENIL) 200 MG tablet Take 1 tablet (200 mg) by mouth 2 times daily 30 tablet 0    ibuprofen (ADVIL/MOTRIN) 600 MG tablet Take 1 tablet (600 mg) by mouth every 8 hours as needed for moderate pain (Patient taking differently: Take 600 mg by mouth every 8 hours as needed for  moderate pain prn) 24 tablet 0    ketorolac (TORADOL) 10 MG tablet Take 1 tablet (10 mg) by mouth every 6 hours as needed for moderate pain 20 tablet 0    metFORMIN (GLUCOPHAGE XR) 500 MG 24 hr tablet Take 1,000 mg by mouth daily (with breakfast)      montelukast (SINGULAIR) 10 MG tablet Take 10 mg by mouth every evening      nabumetone (RELAFEN) 750 MG tablet       omeprazole (PRILOSEC) 40 MG DR capsule Take 1 capsule (40 mg) by mouth daily 90 capsule 3    ondansetron (ZOFRAN-ODT) 4 MG ODT tab Take 1 tablet (4 mg) by mouth every 8 hours as needed for nausea 15 tablet 0    pregabalin (LYRICA) 100 MG capsule Take 1 capsule (100 mg) by mouth 3 times daily 90 capsule 0    Respiratory Therapy Supplies (NEBULIZER) BRENDAN Nebulizer device.  Albuterol nebulization every 2 hours as needed for shortness of breath or wheezing. 1 each 0    saline nasal (AYR SALINE) GEL topical gel Apply into each nare 2 times daily Place in front of each side of your nose and breathe in to distribute gel twice daily. 14.1 g 11    Semaglutide 3 MG TABS Take 3 mg by mouth daily before breakfast Then 7mg daily for second month. Then 14 mg daily      sodium chloride (OCEAN) 0.65 % nasal spray Spray 2 sprays in each side of the nose every 3 hours while awake. 44 mL 11    SUMAtriptan (IMITREX) 25 MG tablet Take 25 mg by mouth at onset of headache for migraine May repeat in 2 hours.      valACYclovir (VALTREX) 1000 mg tablet Take 2,000 mg by mouth 2 times daily as needed Take 2 tablets by mouth two times daily for one day. Use as needed at onset of cold sore.       Toradol used for pain as needed-     Is patient on biologics or immunomodulators? YES- Plaquenil   -red blotchy patches on skin from autoimmune disorder     ALLERGIES:  Allergies   Allergen Reactions    Amoxicillin-Pot Clavulanate Other (See Comments), Swelling and Rash     PN: facial swelling, left side. Also had cortisone injection the same day as she started the Augmentin.  Noted in  downtime recovery of chart.    PN: facial swelling, left side. Also had cortisone injection the same day as she started the Augmentin.; HUT Comment: PN: facial swelling, left side. Also had cortisone injection the same day as she started the Augmentin.Noted in downtime recovery of chart.; HUT Reaction: Rash; HUT Reaction: Unknown; HUT Reaction Type: Allergy; HUT Severity: Med; HUT Noted: [id number]  PN: facial swelling, left side. Also had cortisone injection the same day as she started the Augmentin.  Other reaction(s): *Unknown  PN: facial swelling, left side. Also had cortisone injection the same day as she started the Augmentin.  Noted in downtime recovery of chart.  PN: facial swelling, left side. Also had cortisone injection the same day as she started the Augmentin.  Other reaction(s): Facial swelling  Other reaction(s): Facial swelling    Hydrocodone Nausea and Vomiting and GI Disturbance     vomiting for days, PN: vomiting for days; HUT Comment: vomiting for days; HUT Reaction: Gastrointestinal; HUT Reaction: Nausea And Vomiting; HUT Reaction Type: Intolerance; HUT Severity: Med; HUT Noted: [id number]  vomiting for days    Other reaction(s): Rash    Hydrocodone-Acetaminophen Nausea and Vomiting and Rash     Update on 12/12  Pt says she can take tylenol just not the hydrocodone.   Other reaction(s): Rash      Influenza Vaccines Other (See Comments) and Nausea and Vomiting     HUT Reaction: Nausea And Vomiting; HUT Reaction Type: Intolerance; HUT Severity: Low; HUT Noted: [id number]    Latex Rash     HUT Reaction: Rash; HUT Reaction Type: Allergy; HUT Severity: Low; HUT Noted: [id number]  Other reaction(s): Rash      Oseltamivir Hives     med stopped, PN: med stopped  med stopped, PN: med stopped; HUT Comment: med stopped, PN: med stopped; HUT Reaction: Hives; HUT Reaction Type: Allergy; HUT Severity: Med; HUT Noted: [id number]    Penicillins Anaphylaxis     HUT Reaction: Anaphylaxis; HUT Reaction Type: Allergy; HUT  Severity: High; HUT Noted: [id number]    Vancomycin Itching, Swelling and Rash     Other reaction(s): Redness  Flushed face, very itchy; HUT Comment: Flushed face, very itchy; HUT Reaction: Angioedema; HUT Reaction: Redness; HUT Severity: Med; HUT Noted: [id number]    facial    Blood-Group Specific Substance Other (See Comments)     Patient has an anti-Cw and non-specific antibodies. Blood product orders may be delayed. Draw one red top and two purple top tubes for all type/screen/crossmatch orders.  Patient has anti-Cw, anti-K (Advance), Warm auto and nonspecific antibodies. Blood products may be delayed. Draw patient 24 hours prior to transfusion. Draw one red top and two purple top tubes for all type and screen orders.    Clavulanic Acid Angioedema    Fentanyl Itching    Haemophilus B Polysaccharide Vaccine Nausea and Vomiting    Naltrexone Other (See Comments)     Reaction(s): Vivid dreams.    Other Drug Allergy (See Comments)      See original file MRN [id number]. Files are marked for merge    Oxycodone Swelling    Adhesive Tape Rash     Silicone type  Silicone type    Other reaction(s): adhesive allergy  Other reaction(s): adhesive allergy  Silicone type    Other reaction(s): adhesive allergy      Band-Aid Anti-Itch      Other reaction(s): adhesive allergy    Cephalosporins Rash    Lamotrigine Rash     Possibly associated with Lamictal.   HUT Comment: Possibly associated with Lamictal. ; HUT Reaction: Rash; HUT Reaction Type: Allergy; HUT Severity: Low; HUT Noted: [id number]    No Clinical Screening - See Comments Rash and Other (See Comments)     Silicone type  Silicone type  See original file MRN [id number]. Files are marked for merge  History of swallowing sharp metallic objects. She should not be prescribed lancets due to posed risk of swallowing.        Admission on 09/04/2023, Discharged on 09/05/2023   Component Date Value Ref Range Status    Sodium 09/04/2023 143  136 - 145 mmol/L Final    Potassium 09/04/2023  4.1  3.4 - 5.3 mmol/L Final    Chloride 09/04/2023 108 (H)  98 - 107 mmol/L Final    Carbon Dioxide (CO2) 09/04/2023 23  22 - 29 mmol/L Final    Anion Gap 09/04/2023 12  7 - 15 mmol/L Final    Urea Nitrogen 09/04/2023 12.7  6.0 - 20.0 mg/dL Final    Creatinine 09/04/2023 0.64  0.51 - 0.95 mg/dL Final    Calcium 09/04/2023 9.6  8.6 - 10.0 mg/dL Final    Glucose 09/04/2023 124 (H)  70 - 99 mg/dL Final    GFR Estimate 09/04/2023 >90  >60 mL/min/1.73m2 Final    hCG Serum Qualitative 09/04/2023 Negative  Negative Final    This test is for screening purposes.  Results should be interpreted along with the clinical picture.  Confirmation testing is available if warranted by ordering [id number], HCG Quantitative Pregnancy.    WBC Count 09/04/2023 10.3  4.0 - 11.0 10e3/uL Final    RBC Count 09/04/2023 4.33  3.80 - 5.20 10e6/uL Final    Hemoglobin 09/04/2023 13.0  11.7 - 15.7 g/dL Final    Hematocrit 09/04/2023 38.3  35.0 - 47.0 % Final    MCV 09/04/2023 89  78 - 100 fL Final    MCH 09/04/2023 30.0  26.5 - 33.0 pg Final    MCHC 09/04/2023 33.9  31.5 - 36.5 g/dL Final    RDW 09/04/2023 13.2  10.0 - 15.0 % Final    Platelet Count 09/04/2023 296  150 - 450 10e3/uL Final    % Neutrophils 09/04/2023 74  % Final    % Lymphocytes 09/04/2023 17  % Final    % Monocytes 09/04/2023 7  % Final    % Eosinophils 09/04/2023 2  % Final    % Basophils 09/04/2023 0  % Final    % Immature Granulocytes 09/04/2023 0  % Final    NRBCs per 100 WBC 09/04/2023 0  <1 /100 Final    Absolute Neutrophils 09/04/2023 7.6  1.6 - 8.3 10e3/uL Final    Absolute Lymphocytes 09/04/2023 1.7  0.8 - 5.3 10e3/uL Final    Absolute Monocytes 09/04/2023 0.7  0.0 - 1.3 10e3/uL Final    Absolute Eosinophils 09/04/2023 0.2  0.0 - 0.7 10e3/uL Final    Absolute Basophils 09/04/2023 0.0  0.0 - 0.2 10e3/uL Final    Absolute Immature Granulocytes 09/04/2023 0.0  <=0.4 10e3/uL Final    Absolute NRBCs 09/04/2023 0.0  10e3/uL Final       FIB-4 Calculation: 0.38 at 6/27/2023 10:52  PM  Calculated from:  SGOT/AST: 20 U/L at 5/28/2023  7:53 PM  SGPT/ALT: 34 U/L at 5/28/2023  7:53 PM  Platelets: 278 10e3/uL at 6/27/2023 10:52 PM  Age: 32 years    Fib-4 < 1.3: No further evaluation at this point, unless other concerns    - If the Fib-4 is >2.67  Fibroscan and elective liver clinic referral    - Intermediate Fib-4 scores: Get a Fibroscan, consider repeating this in 1-2 years.    Anti-obesity medication ROS:    HEENT  Hx of glaucoma: No    Cardiovascular  CAD:No  HTN:No    Gastrointestinal  GERD:Yes  Constipation/diarrhea/GI issues:Yes - alternating between both- manageable   Liver Dz:Yes  FIB-4 Calculation: 0.38 at 6/27/2023 10:52 PM  Calculated from:  SGOT/AST: 20 U/L at 5/28/2023  7:53 PM  SGPT/ALT: 34 U/L at 5/28/2023  7:53 PM  Platelets: 278 10e3/uL at 6/27/2023 10:52 PM  Age: 32 years    H/O Pancreatitis:No    Psychiatric  Bipolar: No  Anxiety:Yes  Depression:Yes  History of alcohol/drug abuse: hx of self harm to get opoioids  Hx of eating disorder:Yes    Endocrine  Personal or family hx of MTC or MEN2:No  Diabetes/prediabetes: Yes    Neurologic:  Hx of seizures: No  Hx of migraines: Yes  Memory Impairment: Yes- since being on psych meds   Chronic pain/opioids use: No      History of kidney stones: No  Kidney disease: No  Current birth control: Yes IUD and progesterone only       PHYSICAL EXAM:  Objective    Physical Exam   GENERAL: Healthy, alert and no distress  EYES: Eyes grossly normal to inspection.  No discharge or erythema, or obvious scleral/conjunctival abnormalities.  RESP: No audible wheeze, cough, or visible cyanosis.  No visible retractions or increased work of breathing.    SKIN: Visible skin clear. No significant rash, abnormal pigmentation or lesions.  NEURO: Cranial nerves grossly intact.  Mentation and speech appropriate for age.  PSYCH: Mentation appears normal, affect normal/bright, judgement and insight intact, normal speech and appearance well-groomed.      In summary,  Nevin Alvarado has Class III obesity with a body mass index of Body mass index is 56.5 kg/m . kg/m2 and the comorbidities stated above. She completed an informational seminar and is a possible candidate for the laparoscopic gastric sleeve.  She will have to complete the following pre-requisites:    Today in the office we discussed gastric sleeve surgery. Preoperative, perioperative, and postoperative processes, management, and follow up were addressed.  Risks and benefits were outlined including the risk of death, staple line leak (1-2%), PE, DVT, ulcer, worsening GERD, N/V, stricture, hernia, wound infection, weight regain, and vitamin deficiencies. I emphasized exercise and activity along with appropriate food choice as the main foundation for weight loss with surgery providing surgical reinforcement of this.  All questions were answered.  A goal sheet and support group handout were given to the patient.      If you have not already watched our online seminar please go to [email or web address]    Weight loss requirement: 20 prior to surgery. Will have final weight check 2-3 weeks prior to surgery at anesthesia or nurse pre-op teaching visit.    -Need current weight confirmation at primary clinic or weight management clinic No 281lb day of surgery     Bariatric labs ordered, call for a lab only appointment at any [facility] lab. To find a lab location near you, please call [phone]. Please let us know if orders need to be faxed to a non [facility] lab.    Schedule bariatric psych eval as soon as possible.  List of psychologists will be sent to you via [employer] or given to you in clinic.     Call Andrea Dee at [phone] to discuss insurance coverage for bariatric surgery.  Please check with your insurance regarding bariatric surgery coverage also. Andrea can also help you with scheduling psych eval if you are having difficulties.    The following clearance letters are needed:  Letters will be sent to you via [employer] or given to you in clinic  - Letter of support from primary care provider. Provider can submit through electronic medical record or fax to [phone]  - sleep, GI, pcp, therapist, psychiatrist, psych eval   Smoking cessation and nicotine test needed: No    Birth control after surgery discussed. Patient instructed that 2 forms of birth control required after surgery and to avoid pregnancy for at least 18 months after surgery: IUD    NEXT VISITS: A  should reach out to you to schedule the following appointments.  If they do not reach you please call [phone] to schedule the following appointments:    -See dietitian in 1 month and monthly for  Months    -See Dr Benitez after psych eval-  Important items to discuss : GI Hx and risk for future complications       Once the patient has completed the requirements in their task list and there are no further recommendations, the pt will be allowed to see the surgeon of her choice for consultation on the laparoscopic gastric sleeve surgery. Patient verbalizes understanding of the process to surgery and expectations for the postoperative period including the need for lifelong lifestyle changes, vitamin supplementation, and laboratory monitoring.    Medications that may contribute to the patient's obesity, such as antipsychotic medications, have been identified. Correctable endocrine disorders and other medical conditions have been ruled out, or are under successful treatment. Identified personal barriers to making and continuing needed life changes. Patient has shown compliance with recommended strategies to address identified personal barriers to making and continuing needed life changes.    Sincerely,     Sharon Toro NP      75 minutes spent by me on the date of the encounter doing chart review, history and exam, documentation and further activities per the note    Limited guardian- Janie Hung-Devyn- [phone]- Needs  notification of medication changes and needs to give consent for surgery- Has worked with her for 4-5 years now. - would need to know about new medication. Patient states she may have more to say about mental health as well     Nurse from [facility]- Nish Padilla - [phone] - is GLP1 option? Storage available? Weekly injection administration available?      Virtual Visit Details    Type of service:  Video Visit   Video Start Time: 1102  Video End Time:1207    Originating Location (pt. Location): Home    Distant Location (provider location):  Off-site  Platform used for Video Visit: Thuy

## 2023-09-13 NOTE — LETTER
9/13/2023       RE: Nevin Alvarado  [address] [facility] [address]     Dear Colleague,    Thank you for referring your patient, Nevin Alvarado, to the [facility] [place] at [facility]. Please see a copy of my visit note below.    Video-Visit Details    Type of service:  Video Visit    Video Start Time: 12:59 PM   Video End Time: 1:55 PM     Originating Location (pt. Location): Home    Distant Location (provider location): Offsite (providers home) [facility] [place]     Platform used for Video Visit: Mobile Medical Testing    New Bariatric Nutrition Consultation Note    Reason For Visit: Nutrition Assessment    Nevin Alvarado is a 32 year old presenting today for new bariatric nutrition consult.  Provided the weight loss surgery nutrition class information during this visit.  Pt is interested in laparoscopic sleeve gastrectomy.  Patient is accompanied by self.  This is pt's first of required nutrition visits prior to surgery.     Pt referred by Sharon Toro NP on September 12, 2023.  CO-MORBIDITIES OF OBESITY INCLUDE:        9/12/2023    12:48 PM   --   I have the following health issues associated with obesity Type II Diabetes    High Blood Pressure    Sleep Apnea    Polycystic Ovarian Syndrome    GERD (Reflux)    Fatty Liver    Asthma    Stress Incontinence       SUPPORT:      9/12/2023    12:48 PM   Support System Reviewed With Patient   Who do you have in your support network that can be available to help you for the first 2 weeks after surgery? I live in a group home with 24 hour staff, mom   Who can you count on for support throughout your weight loss surgery journey? a friend, and my therapist       ANTHROPOMETRICS:  Initial Consult Weight: 309 lb on 9/13/23  Estimated body mass index is 56.5 kg/m  as calculated from the following:    Height as of an earlier encounter on  "9/13/23: 1.575 m (5' 2.01\").    Weight as of an earlier encounter on 9/13/23: 140.2 kg (309 lb).    Required weight loss goal pre-op: -20 lbs from initial consult weight (goal weight 289 lbs or less before surgery)        9/12/2023    12:48 PM   --   I have tried the following methods to lose weight Watching portions or calories    Exercise    Pre packaged meals ex: Nutrisystem    OTC Medications    Prescription Medications           9/12/2023    12:48 PM   Weight Loss Questions Reviewed With Patient   How long have you been overweight? Since late teens through early 20's       MEDICATION FOR WEIGHT LOSS:  topiramate- took in 2014 for mood- caused anorexia   Naltrexone - suicidal thoughts     Hx of self harm- swallowing thing- started after she was treated for anorexia when taking topiramate- in 6 months has only had one day of self harm- this was 2 weeks ago and instead of swallowing she inserted something per rectum. - Followed by MASON Potter PA-C, PE in 2019 after esophageal perforation repair     VITAMIN/MINERAL SUPPLEMENTS:  Iron     NUTRITION HISTORY:  Food allergies:NKFA  Food intolerances: None  Previous experience with diet modification for weight loss: Nutrisystem, prescription medications, exercise, watching calories or portions     Has been meal planning for the past 3 months. Likes chicken, turkey, shrimp.         9/12/2023    12:48 PM   Recall Diet Questions Reviewed With Patient   Describe what you typically consume for breakfast (typical or most recent) eggs and hash browns, homemade waffles, laith pudding with fruit   Describe what you typically consume for lunch (typical or most recent) homemade lunchables, some pasta or chicken   Describe what you typically consume for supper (typical or most recent) low calorie meal prep meals   Describe what you typically consume as snacks (typical or most recent) cheese sticks, fruit jerky, fruits/vegetables   How many ounces of water, or other low " calorie drinks, do you drink daily (8 oz=1 glass)? 48 oz   How many ounces of caffeine (coffee, tea, pop) do you drink daily (8 oz=1 glass)? 16 oz   How many ounces of carbonated (pop, beer, sparkling water) drinks do you drinky daily (8 oz=1 glass)? 0 oz   How many ounces of juice, pop, sweet tea, sports drinks, protein drinks, other sweetened drinks, do you drink daily (8 oz=1 glass)? 0 oz   How many ounces of milk do you drink daily (8 oz=1 glass) 0 oz   How often do you drink alcohol? Never           9/12/2023    12:48 PM   Eating Habits   What foods do you crave? ice cream, chocolate, sometimes just salty chips           9/12/2023    12:48 PM   Dining Out History Reviewed With Patient   How often do you dine out? Rarely.   Where do you dine out? (select all that apply) take out   What types of food do you order when you dine out? jitendra verdin     EXERCISE:        9/12/2023    12:48 PM   --   How often do you exercise? Less than 1 time per week   What is the duration of your exercise (in minutes)? 10 Minutes   What types of exercise do you do? other   What keeps you from being more active? Pain    My ability to walk or move around is limited    Shortness of breath    Too tired     NUTRITION DIAGNOSIS:  Obesity r/t long history of positive energy balance aeb BMI >30 kg/m2.    INTERVENTION:  Intervention Provided/Education Provided on post-op diet guidelines, vitamins/minerals essential post-operatively, GI anatomy of bariatric surgeries, ways to help prepare for post-op diet guidelines pre-operatively, portion/calorie-control, mindful eating and sources of protein.  Patient demonstrates understanding.     Personal barriers to making and continuing required life changes have been identified, and strategies to overcome those barriers have been recommended AND family and social supports have been assessed and strategies to strengthen those supports have been recommended.    Provided pt with list of goals, RD contact  information and resources listed below via [email or web address].             9/12/2023    12:48 PM   Questions Reviewed With Patient   How ready are you to make changes regarding your weight? Number 1 = Not ready at all to make changes up to 10 = very ready. 10   How confident are you that you can change? 1 = Not confident that you will be successful making changes up to 10 = very confident. 7     Expected Engagement: good    GOALS:  Relating To Eating:  - Eat slowly (20-30 minutes per meal), chewing foods well (25 chews per bite/applesauce consistency)  - Focus on eating smaller portion sizes at meals and snacks  -Aim to consume 60 grams of protein each (ex. 20 grams per meal)    Relating to beverages:  - Reduce caffeine/carbonation/calorie containing beverages  - Separate fluids from meals by 30 minutes before, during, and after eating  - Drink 48-64 ounces of fluid per day. Small, frequent sips between meals.    Relating to activity:  Increase activity as able    The Plate Method  [email or web address]    Protein Sources for Weight Loss  [email or web address]     Carbohydrates  [email or web address]     Mindful Eating  [email or web address]     Summary of Volumetrics Eating Plan  [email or web address]     Diet Guidelines after Weight Loss Surgery  [email or web address]     Seated Exercises for Arms and Legs (can be done before or after surgery)  [email or web address]    Follow Up: October 13.   Time spent with patient: 56 minutes.  Nevin Birmingham, SIDNEY, LD

## 2023-09-13 NOTE — PROGRESS NOTES
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  Location: [facility]     SIGMOIDOSCOPY FLEXIBLE N/A 2/20/2019    Procedure: Exam under anesthesia Colonoscopy with attempted  removal of rectal foreign body;  Surgeon: Estrada Chávez MD;  Location:  OR       FAMILY HISTORY:   Family History   Problem Relation Age of Onset     Diabetes Type 2  Maternal  Grandmother      Diabetes Type 2  Paternal Grandmother      Breast Cancer Paternal Grandmother      Cerebrovascular Disease Father 53     Myocardial Infarction No family hx of      Coronary Artery Disease Early Onset No family hx of      Ovarian Cancer No family hx of      Colon Cancer No family hx of      Depression Mother      Anxiety Disorder Mother        SOCIAL HISTORY:       9/12/2023    12:48 PM   Social History Questions Reviewed With Patient   Which best describes your employment status (select all that apply) I am disabled   Which best describes your marital status single   Who do you have in your support network that can be available to help you for the first 2 weeks after surgery? I live in a group home with 24 hour staff, mom   Who can you count on for support throughout your weight loss surgery journey? a friend, and my therapist       HABITS:      9/12/2023    12:48 PM   --   How often do you drink alcohol? Never   Do you currently use any of the following Nicotine products? No   Have you ever used any of the following nicotine products? No   Have you or are you currently using street drugs or prescription strength medication for which you do not have a prescription for? No   Do you have a history of chemical dependency (alcohol or drug abuse)? Yes     Currently taking narcotic/opioids No    PSYCHOLOGICAL HISTORY:       9/12/2023    12:48 PM   Psychological History Reviewed With Patient   Have you ever attempted suicide? More than 10 years ago.   Have you had thoughts of suicide in the past year? No   Have you ever been hospitalized for mental illness or a suicide attempt? In the last 5 to 10 years.   Do you have a history of chronic pain? Yes   Have you ever been diagnosed with fibromyalgia? No   Are you currently being treated for any of the following? (select all that apply) Anxiety    I take medication or see a therapist for another mental illness   Are you currently seeing a therapist or  counselor? Yes   Are you currently seeing a psychiatrist? Yes       ROS:      9/12/2023    12:48 PM   --   Skin Skin fold rashes (groin or other folds)    Leg swelling   HEENT Dizziness/lightheadedness   Musculoskeletal Joint Pain    Back pain    Limited mobility    Swelling of legs   Cardiovascular Shortness of breath with activity   Pulmonary Shortness of breath at rest    Shortness of breath with activity    Snoring    People have told me I stop breathing while asleep   Gastrointestinal Heartburn    Reflux    Diarrhea    Difficulty swallowing (food gets stuck)   Genitourinary Stress incontinence (losing urine when coughing, sneezing, etc.)   Hematological Pulmonary embolism (PE)   Neurological Migraine headaches   Female only Loss of menstrual cycles    Excessive menstrual bleeding    Irregular menstrual cycles    Polycystic ovarian syndrome (PCOS)    Birth control       EATING BEHAVIORS:      9/12/2023    12:48 PM   --   Have you or anyone else thought that you had an eating disorder? No   Do you currently binge eat (eat a large amount of food in a short time)? No   Are you an emotional eater? No   Do you get up to eat after falling asleep? No   Can you afford 3 meals a day? Yes   Can you afford 50-60 dollars a month for vitamins? No       EXERCISE:      9/12/2023    12:48 PM   --   How often do you exercise? Less than 1 time per week   What is the duration of your exercise (in minutes)? 10 Minutes   What types of exercise do you do? other   What keeps you from being more active? Pain    My ability to walk or move around is limited    Shortness of breath    Too tired       MEDICATIONS:  Current Outpatient Medications   Medication Sig Dispense Refill     clonazePAM (KLONOPIN) 0.5 MG tablet Take 0.5mg daily PRN anxiety- 20 tablets per month, try to keep it to 3-4x per week       albuterol (PROAIR HFA/PROVENTIL HFA/VENTOLIN HFA) 108 (90 Base) MCG/ACT inhaler Inhale 2 puffs into the lungs every 6 hours as needed for  shortness of breath / dyspnea or wheezing 18 g 0     albuterol (PROVENTIL) (2.5 MG/3ML) 0.083% neb solution Take 2.5 mg by nebulization every 6 hours as needed for shortness of breath or wheezing       BANOPHEN 2-0.1 % external cream Apply 1 applicator topically 2 times daily as needed for itching       brexpiprazole (REXULTI) 2 MG tablet Take 2 mg by mouth every evening       cetirizine (ZYRTEC) 10 MG tablet Take 1 tablet (10 mg) by mouth daily (Patient taking differently: Take 10 mg by mouth every evening) 30 tablet 0     Cholecalciferol (D3 HIGH POTENCY) 25 MCG (1000 UT) CAPS Take 50 mcg by mouth daily       cyclobenzaprine (FLEXERIL) 10 MG tablet Take 0.5-1 tablets (5-10 mg) by mouth 3 times daily as needed for muscle spasms 20 tablet 0     desvenlafaxine (PRISTIQ) 100 MG 24 hr tablet Take 1 tablet (100 mg) by mouth daily (Patient taking differently: Take 50 mg by mouth daily) 30 tablet 0     ferrous sulfate (FEROSUL) 325 (65 Fe) MG tablet Take 1 tablet (325 mg) by mouth daily (with breakfast) 30 tablet 0     fluocinonide (LIDEX) 0.05 % external cream Apply 1 Application topically 2 times daily as needed Apply thinly to knee 2 times daily, Monday through Fridays, off on weekends as needed. Avoid face and skin folds.       furosemide (LASIX) 20 MG tablet Take 20 mg by mouth daily       gabapentin 8 % in PLO cream Apply 1 click (0.25 g) topically every 8 hours as needed for neuropathic pain (right thigh) 30 g 4     hydroxychloroquine (PLAQUENIL) 200 MG tablet Take 1 tablet (200 mg) by mouth 2 times daily 30 tablet 0     ibuprofen (ADVIL/MOTRIN) 600 MG tablet Take 1 tablet (600 mg) by mouth every 8 hours as needed for moderate pain (Patient taking differently: Take 600 mg by mouth every 8 hours as needed for moderate pain prn) 24 tablet 0     ketorolac (TORADOL) 10 MG tablet Take 1 tablet (10 mg) by mouth every 6 hours as needed for moderate pain 20 tablet 0     metFORMIN (GLUCOPHAGE XR) 500 MG 24 hr tablet Take  1,000 mg by mouth daily (with breakfast)       montelukast (SINGULAIR) 10 MG tablet Take 10 mg by mouth every evening       nabumetone (RELAFEN) 750 MG tablet        omeprazole (PRILOSEC) 40 MG DR capsule Take 1 capsule (40 mg) by mouth daily 90 capsule 3     ondansetron (ZOFRAN-ODT) 4 MG ODT tab Take 1 tablet (4 mg) by mouth every 8 hours as needed for nausea 15 tablet 0     pregabalin (LYRICA) 100 MG capsule Take 1 capsule (100 mg) by mouth 3 times daily 90 capsule 0     Respiratory Therapy Supplies (NEBULIZER) BRENDAN Nebulizer device.  Albuterol nebulization every 2 hours as needed for shortness of breath or wheezing. 1 each 0     saline nasal (AYR SALINE) GEL topical gel Apply into each nare 2 times daily Place in front of each side of your nose and breathe in to distribute gel twice daily. 14.1 g 11     Semaglutide 3 MG TABS Take 3 mg by mouth daily before breakfast Then 7mg daily for second month. Then 14 mg daily       sodium chloride (OCEAN) 0.65 % nasal spray Spray 2 sprays in each side of the nose every 3 hours while awake. 44 mL 11     SUMAtriptan (IMITREX) 25 MG tablet Take 25 mg by mouth at onset of headache for migraine May repeat in 2 hours.       valACYclovir (VALTREX) 1000 mg tablet Take 2,000 mg by mouth 2 times daily as needed Take 2 tablets by mouth two times daily for one day. Use as needed at onset of cold sore.       Toradol used for pain as needed-     Is patient on biologics or immunomodulators? YES- Plaquenil   -red blotchy patches on skin from autoimmune disorder     ALLERGIES:  Allergies   Allergen Reactions     Amoxicillin-Pot Clavulanate Other (See Comments), Swelling and Rash     PN: facial swelling, left side. Also had cortisone injection the same day as she started the Augmentin.  Noted in downtime recovery of chart.    PN: facial swelling, left side. Also had cortisone injection the same day as she started the Augmentin.; HUT Comment: PN: facial swelling, left side. Also had cortisone  injection the same day as she started the Augmentin.Noted in downtime recovery of chart.; HUT Reaction: Rash; HUT Reaction: Unknown; HUT Reaction Type: Allergy; HUT Severity: Med; HUT Noted: [id number]  PN: facial swelling, left side. Also had cortisone injection the same day as she started the Augmentin.  Other reaction(s): *Unknown  PN: facial swelling, left side. Also had cortisone injection the same day as she started the Augmentin.  Noted in downtime recovery of chart.  PN: facial swelling, left side. Also had cortisone injection the same day as she started the Augmentin.  Other reaction(s): Facial swelling  Other reaction(s): Facial swelling     Hydrocodone Nausea and Vomiting and GI Disturbance     vomiting for days, PN: vomiting for days; HUT Comment: vomiting for days; HUT Reaction: Gastrointestinal; HUT Reaction: Nausea And Vomiting; HUT Reaction Type: Intolerance; HUT Severity: Med; HUT Noted: [id number]  vomiting for days    Other reaction(s): Rash     Hydrocodone-Acetaminophen Nausea and Vomiting and Rash     Update on 12/12  Pt says she can take tylenol just not the hydrocodone.   Other reaction(s): Rash       Influenza Vaccines Other (See Comments) and Nausea and Vomiting     HUT Reaction: Nausea And Vomiting; HUT Reaction Type: Intolerance; HUT Severity: Low; HUT Noted: [id number]     Latex Rash     HUT Reaction: Rash; HUT Reaction Type: Allergy; HUT Severity: Low; HUT Noted: [id number]  Other reaction(s): Rash       Oseltamivir Hives     med stopped, PN: med stopped  med stopped, PN: med stopped; HUT Comment: med stopped, PN: med stopped; HUT Reaction: Hives; HUT Reaction Type: Allergy; HUT Severity: Med; HUT Noted: [id number]     Penicillins Anaphylaxis     HUT Reaction: Anaphylaxis; HUT Reaction Type: Allergy; HUT Severity: High; HUT Noted: [id number]     Vancomycin Itching, Swelling and Rash     Other reaction(s): Redness  Flushed face, very itchy; HUT Comment: Flushed face, very itchy; HUT Reaction:  Angioedema; HUT Reaction: Redness; HUT Severity: Med; HUT Noted: [id number]    facial     Blood-Group Specific Substance Other (See Comments)     Patient has an anti-Cw and non-specific antibodies. Blood product orders may be delayed. Draw one red top and two purple top tubes for all type/screen/crossmatch orders.  Patient has anti-Cw, anti-K (Angella), Warm auto and nonspecific antibodies. Blood products may be delayed. Draw patient 24 hours prior to transfusion. Draw one red top and two purple top tubes for all type and screen orders.     Clavulanic Acid Angioedema     Fentanyl Itching     Haemophilus B Polysaccharide Vaccine Nausea and Vomiting     Naltrexone Other (See Comments)     Reaction(s): Vivid dreams.     Other Drug Allergy (See Comments)      See original file MRN [id number]. Files are marked for merge     Oxycodone Swelling     Adhesive Tape Rash     Silicone type  Silicone type    Other reaction(s): adhesive allergy  Other reaction(s): adhesive allergy  Silicone type    Other reaction(s): adhesive allergy       Band-Aid Anti-Itch      Other reaction(s): adhesive allergy     Cephalosporins Rash     Lamotrigine Rash     Possibly associated with Lamictal.   HUT Comment: Possibly associated with Lamictal. ; HUT Reaction: Rash; HUT Reaction Type: Allergy; HUT Severity: Low; HUT Noted: [id number]     No Clinical Screening - See Comments Rash and Other (See Comments)     Silicone type  Silicone type  See original file MRN [id number]. Files are marked for merge  History of swallowing sharp metallic objects. She should not be prescribed lancets due to posed risk of swallowing.        Admission on 09/04/2023, Discharged on 09/05/2023   Component Date Value Ref Range Status     Sodium 09/04/2023 143  136 - 145 mmol/L Final     Potassium 09/04/2023 4.1  3.4 - 5.3 mmol/L Final     Chloride 09/04/2023 108 (H)  98 - 107 mmol/L Final     Carbon Dioxide (CO2) 09/04/2023 23  22 - 29 mmol/L Final     Anion Gap 09/04/2023 12  7  - 15 mmol/L Final     Urea Nitrogen 09/04/2023 12.7  6.0 - 20.0 mg/dL Final     Creatinine 09/04/2023 0.64  0.51 - 0.95 mg/dL Final     Calcium 09/04/2023 9.6  8.6 - 10.0 mg/dL Final     Glucose 09/04/2023 124 (H)  70 - 99 mg/dL Final     GFR Estimate 09/04/2023 >90  >60 mL/min/1.73m2 Final     hCG Serum Qualitative 09/04/2023 Negative  Negative Final    This test is for screening purposes.  Results should be interpreted along with the clinical picture.  Confirmation testing is available if warranted by ordering [id number], HCG Quantitative Pregnancy.     WBC Count 09/04/2023 10.3  4.0 - 11.0 10e3/uL Final     RBC Count 09/04/2023 4.33  3.80 - 5.20 10e6/uL Final     Hemoglobin 09/04/2023 13.0  11.7 - 15.7 g/dL Final     Hematocrit 09/04/2023 38.3  35.0 - 47.0 % Final     MCV 09/04/2023 89  78 - 100 fL Final     MCH 09/04/2023 30.0  26.5 - 33.0 pg Final     MCHC 09/04/2023 33.9  31.5 - 36.5 g/dL Final     RDW 09/04/2023 13.2  10.0 - 15.0 % Final     Platelet Count 09/04/2023 296  150 - 450 10e3/uL Final     % Neutrophils 09/04/2023 74  % Final     % Lymphocytes 09/04/2023 17  % Final     % Monocytes 09/04/2023 7  % Final     % Eosinophils 09/04/2023 2  % Final     % Basophils 09/04/2023 0  % Final     % Immature Granulocytes 09/04/2023 0  % Final     NRBCs per 100 WBC 09/04/2023 0  <1 /100 Final     Absolute Neutrophils 09/04/2023 7.6  1.6 - 8.3 10e3/uL Final     Absolute Lymphocytes 09/04/2023 1.7  0.8 - 5.3 10e3/uL Final     Absolute Monocytes 09/04/2023 0.7  0.0 - 1.3 10e3/uL Final     Absolute Eosinophils 09/04/2023 0.2  0.0 - 0.7 10e3/uL Final     Absolute Basophils 09/04/2023 0.0  0.0 - 0.2 10e3/uL Final     Absolute Immature Granulocytes 09/04/2023 0.0  <=0.4 10e3/uL Final     Absolute NRBCs 09/04/2023 0.0  10e3/uL Final       FIB-4 Calculation: 0.38 at 6/27/2023 10:52 PM  Calculated from:  SGOT/AST: 20 U/L at 5/28/2023  7:53 PM  SGPT/ALT: 34 U/L at 5/28/2023  7:53 PM  Platelets: 278 10e3/uL at 6/27/2023 10:52  PM  Age: 32 years    Fib-4 < 1.3: No further evaluation at this point, unless other concerns    - If the Fib-4 is >2.67  Fibroscan and elective liver clinic referral    - Intermediate Fib-4 scores: Get a Fibroscan, consider repeating this in 1-2 years.    Anti-obesity medication ROS:    HEENT  Hx of glaucoma: No    Cardiovascular  CAD:No  HTN:No    Gastrointestinal  GERD:Yes  Constipation/diarrhea/GI issues:Yes - alternating between both- manageable   Liver Dz:Yes  FIB-4 Calculation: 0.38 at 6/27/2023 10:52 PM  Calculated from:  SGOT/AST: 20 U/L at 5/28/2023  7:53 PM  SGPT/ALT: 34 U/L at 5/28/2023  7:53 PM  Platelets: 278 10e3/uL at 6/27/2023 10:52 PM  Age: 32 years    H/O Pancreatitis:No    Psychiatric  Bipolar: No  Anxiety:Yes  Depression:Yes  History of alcohol/drug abuse: hx of self harm to get opoioids  Hx of eating disorder:Yes    Endocrine  Personal or family hx of MTC or MEN2:No  Diabetes/prediabetes: Yes    Neurologic:  Hx of seizures: No  Hx of migraines: Yes  Memory Impairment: Yes- since being on psych meds   Chronic pain/opioids use: No      History of kidney stones: No  Kidney disease: No  Current birth control: Yes IUD and progesterone only       PHYSICAL EXAM:  Objective    Physical Exam   GENERAL: Healthy, alert and no distress  EYES: Eyes grossly normal to inspection.  No discharge or erythema, or obvious scleral/conjunctival abnormalities.  RESP: No audible wheeze, cough, or visible cyanosis.  No visible retractions or increased work of breathing.    SKIN: Visible skin clear. No significant rash, abnormal pigmentation or lesions.  NEURO: Cranial nerves grossly intact.  Mentation and speech appropriate for age.  PSYCH: Mentation appears normal, affect normal/bright, judgement and insight intact, normal speech and appearance well-groomed.      In summary, Nevin Alvarado has Class III obesity with a body mass index of Body mass index is 56.5 kg/m . kg/m2 and the comorbidities stated above.  She completed an informational seminar and is a possible candidate for the laparoscopic gastric sleeve.  She will have to complete the following pre-requisites:    Today in the office we discussed gastric sleeve surgery. Preoperative, perioperative, and postoperative processes, management, and follow up were addressed.  Risks and benefits were outlined including the risk of death, staple line leak (1-2%), PE, DVT, ulcer, worsening GERD, N/V, stricture, hernia, wound infection, weight regain, and vitamin deficiencies. I emphasized exercise and activity along with appropriate food choice as the main foundation for weight loss with surgery providing surgical reinforcement of this.  All questions were answered.  A goal sheet and support group handout were given to the patient.      If you have not already watched our online seminar please go to [email or web address]    Weight loss requirement: 20 prior to surgery. Will have final weight check 2-3 weeks prior to surgery at anesthesia or nurse pre-op teaching visit.    -Need current weight confirmation at primary clinic or weight management clinic No 281lb day of surgery     Bariatric labs ordered, call for a lab only appointment at any [facility] lab. To find a lab location near you, please call [phone]. Please let us know if orders need to be faxed to a non [facility] lab.    Schedule bariatric psych eval as soon as possible.  List of psychologists will be sent to you via [employer] or given to you in clinic.     Call Andrea Dee at [phone] to discuss insurance coverage for bariatric surgery.  Please check with your insurance regarding bariatric surgery coverage also. Andrea can also help you with scheduling psych eval if you are having difficulties.    The following clearance letters are needed: Letters will be sent to you via [employer] or given to you in clinic  - Letter of support from primary care provider. Provider can submit through  electronic medical record or fax to [phone]  - sleep, GI, pcp, therapist, psychiatrist, psych eval   Smoking cessation and nicotine test needed: No    Birth control after surgery discussed. Patient instructed that 2 forms of birth control required after surgery and to avoid pregnancy for at least 18 months after surgery: IUD    NEXT VISITS: A  should reach out to you to schedule the following appointments.  If they do not reach you please call [phone] to schedule the following appointments:    -See dietitian in 1 month and monthly for  Months    -See Dr Benitez after psych eval-  Important items to discuss : GI Hx and risk for future complications       Once the patient has completed the requirements in their task list and there are no further recommendations, the pt will be allowed to see the surgeon of her choice for consultation on the laparoscopic gastric sleeve surgery. Patient verbalizes understanding of the process to surgery and expectations for the postoperative period including the need for lifelong lifestyle changes, vitamin supplementation, and laboratory monitoring.    Medications that may contribute to the patient's obesity, such as antipsychotic medications, have been identified. Correctable endocrine disorders and other medical conditions have been ruled out, or are under successful treatment. Identified personal barriers to making and continuing needed life changes. Patient has shown compliance with recommended strategies to address identified personal barriers to making and continuing needed life changes.    Sincerely,     Sharon Toro NP      75 minutes spent by me on the date of the encounter doing chart review, history and exam, documentation and further activities per the note    Limited guardian- Janie Pastor- [phone]- Needs notification of medication changes and needs to give consent for surgery- Has worked with her for 4-5 years now. - would need to know about  new medication. Patient states she may have more to say about mental health as well     Nurse from [facility]- Nish Padilla - [phone] - is GLP1 option? Storage available? Weekly injection administration available?

## 2023-09-13 NOTE — PATIENT INSTRUCTIONS
Jay Rooney,     It was nice to meet you today.     Follow-up with RD on October 13.    Thank you,    Nevin Birmingham, SIDNEY, LD  If you would like to schedule or reschedule an appointment with the RD, please call [phone]    Nutrition Goals  Relating To Eating:  - Eat slowly (20-30 minutes per meal), chewing foods well (25 chews per bite/applesauce consistency)  - Focus on eating smaller portion sizes at meals and snacks  -Aim to consume 60 grams of protein each (ex. 20 grams per meal)    Relating to beverages:  - Reduce caffeine/carbonation/calorie containing beverages  - Separate fluids from meals by 30 minutes before, during, and after eating  - Drink 48-64 ounces of fluid per day. Small, frequent sips between meals.    Relating to activity:  Increase activity as able    The Plate Method  [email or web address]    Protein Sources for Weight Loss  [email or web address]     Carbohydrates  [email or web address]     Mindful Eating  [email or web address]     Summary of Volumetrics Eating Plan  [email or web address]     Diet Guidelines after Weight Loss Surgery  [email or web address]     Seated Exercises for Arms and Legs (can be done before or after surgery)  [email or web address]      COMPREHENSIVE WEIGHT MANAGEMENT PROGRAM  VIRTUAL SUPPORT GROUPS    For Support Group Information:      We offer support groups for patients who are working on weight loss and considering, preparing for or have had weight loss surgery.   There is no cost for this opportunity.  You are invited to attend the?Virtual Support Groups?provided by any of the following locations:    [facility] via Looxii Teams with Roxanna Alonso RN  2.   [place] via Looxii Teams with Bird Franz, PhD, LP  3.   [place] via Lo[employer] Teams with Margie Stock RN  4.   [facility] via Looxii Teams with Margie Yan [facility]    The following Support Group information can also  "be found on our website:  [email or web address]    [email or web address]    [facility] Weight Loss Surgery Support Group    [facility] Weight Loss Surgery Support Group  The support group is a patient-lead forum that meets monthly to share experiences, encouragement and education. It is open to those who have had weight loss surgery, are scheduled for surgery, or are considering surgery.   WHEN: This group meets on the 3rd Wednesday of each month from 5:00PM - 6:00PM virtually using Microsoft Teams.   FACILITATOR: Led by Roxanna Greenberg RD, BLADE, RN, the program's Clinical Coordinator.   TO REGISTER: Please contact the clinic via SHADOW or call the nurse line directly at [phone] to inform our staff that you would like an invite sent to you and to let us know the email you would like the invite sent to. Prior to the meeting, a link with directions on how to join the meeting will be sent to you.    2023 Meetings   April 19: Guest Speaker, Royce Agrawal RD, LD, \"Maintaining Weight Loss after Bariatric Surgery\".  May 17: \"Let's Talk\" a time for the group to share.  June 21: \"Let's Talk\" a time for the group to share.  July 19  August 16  September 20  October 18  November 15  December 20    [facility] Support Groups    Connections Bariatric Care Support Group?  This is open to all pre- and post- operative bariatric surgery patients as well as their support system.   WHEN: This group meets the 2nd Tuesday of each month from 6:30 PM - 8:00 PM virtually using Microsoft Teams.   FACILITATOR: Led by Bird Franz, Ph.D who is a Licensed Psychologist with the [facility] Comprehensive Weight Management Program.   TO REGISTER: Please send an email to Bird Franz, Ph.D., LP at?[email or web address]?if you would like an " "invitation to the group and to learn about using Microsoft Teams.    2023 Meetings  April 11: Guest speaker, Diana Main MD, Bariatrician, [facility] Comprehensive Weight Management Program, \"Injectable Weight Loss Medications\".  May 9  Candice 13  July 11  August 8  September 12  October 10  November 14  December 12    Connections Post-Operative Bariatric Surgery Support Group  This is a support group for [facility] bariatric patients (and those external to [facility]) who have had bariatric surgery and are at least 3 months post-surgery.  WHEN: This support group meets the 4th Wednesday of the month from 11:00 AM - 12:00 PM virtually using Microsoft Teams.   FACILITATOR: Led by Certified Bariatric Nurse, Margie Stock RN.   TO REGISTER: Please send an email to Margie at [email or web address] if you would like an invitation to the group and to learn about using Microsoft Teams.    2023 Meetings  April 26  May 24  Candice 28  July 26  August 23  September 27  October 25  November 22  December 27    [facility]   Healthy Lifestyle Virtual Support Group    Healthy Lifestyle Virtual Support Group?  This is 60 minutes of small group guided discussion, support and resources. All are welcome who want a healthy lifestyle.  WHEN: This group meets monthly on a Friday from 12:30 PM - 1:30 PM virtually using Microsoft Teams.  This group will meet the first Friday of the month beginning In July  FACILITATOR: Led by National Board Certified Health and , Margie Yan [facility].   TO REGISTER: Please send an email to Mragie [email or web address] to receive monthly invites to the group or if you have any questions about having a health .  Prior to the meeting, a link with directions on how to join the meeting will be sent to you.    2023 Meetings  May 19: Let's Talk  June 9: Create Your Coaching Toolkit: Learn How to  Yourself  July 7: Let's " Talk  August 4: Benefits of Fiber with SIDNEY Paz  September 1: Show and Tell (share your aps, podcasts, recipes, hacks, books)  October 6 :Let's Talk  November 3: Introduction to Mindfulness   December 1: Let's Talk

## 2023-09-13 NOTE — Clinical Note
NBS. Lots of psych. Not a candidate for minimum of 6 months- likely more than a year. Hx of self harm with 1 relapse after 6 months of no harm. Has legal guardian and group home nurse who I've put their contact information in bottom of task list. We need to run med changes and any updates with them as well- they have limited say over things but I sense there is some staff splitting going on so just to keep everyone on same page. I don't anticipate her to get cleared by psych but did discuss surgery through with her. She'll need simple clear instructions. Short term memory issues too. Ailyn- I'm thinking multiple "[employer]t messages would be confusing? Maybe mail her the packet? And send one [employer] tmessage with other information? Main thing she needs to do first is start working with RD and get psych scheduled.

## 2023-09-13 NOTE — NURSING NOTE
Is the patient currently in the state of [place]? YES    Visit mode:VIDEO    If the visit is dropped, the patient can be reconnected by: VIDEO VISIT: Text to cell phone:   Telephone Information:   Mobile [phone]       Will anyone else be joining the visit? NO  (If patient encounters technical issues they should call [phone]    How would you like to obtain your AVS? MyChart    Are changes needed to the allergy or medication list? Yes taking Nabumetone 750mg  and Pt stated no changes to allergies    Reason for visit: Consult    Janie ANTONIO

## 2023-09-13 NOTE — NURSING NOTE
Is the patient currently in the state of [place]? YES    Visit mode:VIDEO    If the visit is dropped, the patient can be reconnected by: VIDEO VISIT: Text to cell phone:   Telephone Information:   Mobile [phone]       Will anyone else be joining the visit? NO  (If patient encounters technical issues they should call [phone])    How would you like to obtain your AVS? MyChart    Are changes needed to the allergy or medication list? Yes Nabumetone 750mg  and Pt stated no changes to allergies    Reason for visit: Consult    Janie ANTONIO

## 2023-09-14 ENCOUNTER — CARE COORDINATION (OUTPATIENT)
Dept: ENDOCRINOLOGY | Facility: CLINIC | Age: 33
End: 2023-09-14
Payer: COMMERCIAL

## 2023-09-14 NOTE — PROGRESS NOTES
Spoke with CARMEN Mock per Sharon Toro, CNP request to see if patient could possibly start Ozempic or Mounjaro weekly injections with history of self harm. He stated that someone is with patient 24 hours per day and someone would be able to administer injection or patient could be taught to self inject under supervision. There is refrigeration available for medications. All of patients prescriptions are filled through [facility] pharmacy.     Will inform Sharon Toro of above.

## 2023-09-14 NOTE — ASSESSMENT & PLAN NOTE
Adult onset weight gain which was more manageable early on. Was able to lose 40+lb on herbalife in 2013. Weight became more difficult to manage with onset of mental health challenges in 2014. With changes in mental health medication she continued to see large amounts of weight gain, never associated with any specific medication. During this time was experiencing self harm most often through swallowing non-food objects which has lead to dysphagia, perforation of esophagus, and strictures for which she is followed by GI. She had been without self harm for 6 months until 2 weeks ago when she had an episode which she immediately regretted.     She has been wanting to lose weight for years, trying numerous diets and products without any benefit. She has taken some medications for weight loss as well with primary care which has not been successful. She would like to consider bariatric surgery. We discussed my concerns with bariatric surgery to include risk for ongoing self harm and altering her GI system with bariatric surgery. We discussed the increased risk for ulcers along staple line in stomach after part of stomach is removed. We also discussed the significant impact bariatric surgery has on mental health which can cause people to have worsening mental health after surgery. We discussed our team's policy to wait 1 year from any attempts at self harm to move forward with surgery. I suggested scheduling psych eval first and starting the discussion with therapist and psychiatrist first. We'd need approval from all 3 to move forward with surgery. We did discuss risks and benefits of surgery today as well.     Comorbidities include ZOHRA, DMII, GERD, and a hx of PE in 2019. DMII is well controlled with metformin and oral semaglutide 14mg daily. She was started on oral due to living in group home. She would be open to switching to injection of GLP1 to increase efficacy but would need to work this out with group home nurse  and legal guardian. We discussed role of  antiobesity medications and medical weight management. We can do this as we consider bariatric surgery.     Mental health, aside from recent relapse, had been stable for several months. Had previously tried topiramate with negative mood changes and significant restriction/binging/ purging on this drug per patient. Naltrexone caused suicidal thoughts. Discussed limited  antiobesity medications from there.

## 2023-09-14 NOTE — PATIENT INSTRUCTIONS
"Hi Nevin , it was nice to meet you today!  Thank you for allowing us the privilege of caring for you. We hope we provided you with the excellent service you deserve.   Please let us know if there is anything else we can do for you so that we can be sure you are completely satisfied with your care experience.    To ensure the quality of our services you may be receiving a patient satisfaction survey from an independent patient satisfaction monitoring company.    The greatest compliment you can give is a \"Likely to Recommend\"    Your visit was with Sharon Toro NP today.    Instructions per today's visit:     Follow up  plan:  Plan:  Schedule bariatric psych eval   Talk to therapist and psychiatrist about possibility of surgery and weight loss  Start working with dietitian   Our team will reach out to nurse at group home to consider injectable semaglutide for better efficacy- would prefer self contained injection rather than needle that attaches due to risk for self harm- so wegovy/trulicity/ mounjaro. Will also need to involve legal guardian.   Other clearances if we move towards surgery -will be done after psych- sleep, GI, pcp.   Follow up with me in 3 months   ___________________________________________________________________________  Important contact and scheduling information:  Please call our contact center at [phone] to schedule your next appointments.  For any nursing questions or concerns call Kathy Rene LPN at [phone] or Rhina Garcia RN at [phone]  Please call during clinic hours Monday through Friday 8:00a - 4:00p if you have questions or you can contact us via K[employer]Loc[employer] at anytime and we will reply during clinic hours.    Lab results will be communicated through My Chart or letter (if My Chart not used). Please call the clinic if you have not received communication after 1 week or if you have any questions.?  Clinic Fax: " "[phone]  __________________________________________________________________________    If labs were ordered today:    Please make an appointment to have them drawn at your convenience.     To schedule the Lab Appointment using [employer]an[employer]:  Select \"Schedule an Appointment\"  Select \"Lab Only\"  For \"A couple of questions\", select \"Other\"  For \"Which locations work for you?, select the location and set up the appointment    To schedule by phone call [phone] to schedule a lab only appointment at any [facility] lab.  ___________________________________________________________________________  Work with A Health !  Virtual Sessions are Available through [facility] Weight Management Clinics    To learn more, call to schedule a free, Health  Q&A appointment: [phone]     What is Health Coaching?  Do you know what you are supposed to do, but you just aren't doing it?  Then, HEALTH COACHING may help you!   Get unstuck and move forward with the support of a professionally trained [facility]-HWC (National Board-Certified Health and ) who uses evidence-based approaches to help you move forward with healthy lifestyle changes in the areas of weight loss, stress management and overall well-being.    Health Coaches help you identify goals that will work best for you. Health Coaches provide support and encouragement with overcoming barriers and help you to find inspiration and motivation to lead a healthy lifestyle.    Option one:  Health Coaching 3-Pack; Three, 30-minute Health Coaching Visits, for $99  Visits are done virtually (phone or video)  This is a self pay service; we do not accept insurance for jennifer coaching.    Option two:   The 24 week Plan; 11 Health Coaching Visits, and a 7 months subscription to [employer]-- on-demand fitness, nutrition and mindfulness classes, for $499 (employee discounts may be available). Participants will also meet regularly with a weight management Medical " Provider and a Registered/Licensed Dietician.  This is a self-pay service; we do not accept insurance for health coaching.    To Schedule a free Health  Q&A appointment to learn more,  call [phone].  ____________________________________________________________________    [facility]   Healthy Lifestyle Virtual Support Group    Healthy Lifestyle Virtual Support Group?  This is 60 minutes of small group guided discussion, support and resources. All are welcome who want a healthy lifestyle.  WHEN: Starting in July 2023, this group meets the 1st Friday of the month from 12:30 PM - 1:30 PM virtually using Microsoft Teams.    FACILITATOR: Led by National Board Certified Health and , Margie Yan On license of [facility].   TO REGISTER: Please send an email to Margie [email or web address] to receive monthly invites to the group or if you have any questions about having a health .  Prior to the meeting, a link with directions on how to join the meeting will be sent to you.    2023 Meetings  May 19: Let's Talk  June 9: Create Your Coaching Toolkit: Learn How to  Yourself  July 7: Let's Talk  August 4: Benefits of Fiber with SIDNEY Paz  September 1: Show and Tell (share your aps, podcasts, recipes, hacks, books)  October 6 :Let's Talk  November 3: Introduction to Mindfulness   December 1: Let's Talk    If you would like bariatric surgery specific support group info please let your care team know.         Thank you,   [facility] Comprehensive Weight Management Team

## 2023-09-15 ENCOUNTER — HOSPITAL ENCOUNTER (EMERGENCY)
Facility: CLINIC | Age: 33
Discharge: NURSING, REHAB OR LONG-TERM CARE | End: 2023-09-16
Attending: EMERGENCY MEDICINE | Admitting: EMERGENCY MEDICINE
Payer: COMMERCIAL

## 2023-09-15 DIAGNOSIS — R10.84 GENERALIZED ABDOMINAL PAIN: ICD-10-CM

## 2023-09-15 PROCEDURE — 99285 EMERGENCY DEPT VISIT HI MDM: CPT | Mod: 25

## 2023-09-15 PROCEDURE — 80053 COMPREHEN METABOLIC PANEL: CPT | Performed by: EMERGENCY MEDICINE

## 2023-09-15 PROCEDURE — 96374 THER/PROPH/DIAG INJ IV PUSH: CPT | Mod: 59

## 2023-09-15 PROCEDURE — 36415 COLL VENOUS BLD VENIPUNCTURE: CPT | Performed by: EMERGENCY MEDICINE

## 2023-09-15 PROCEDURE — 96375 TX/PRO/DX INJ NEW DRUG ADDON: CPT

## 2023-09-15 PROCEDURE — 84702 CHORIONIC GONADOTROPIN TEST: CPT | Performed by: EMERGENCY MEDICINE

## 2023-09-15 PROCEDURE — 250N000011 HC RX IP 250 OP 636: Performed by: EMERGENCY MEDICINE

## 2023-09-15 PROCEDURE — 85025 COMPLETE CBC W/AUTO DIFF WBC: CPT | Performed by: EMERGENCY MEDICINE

## 2023-09-15 PROCEDURE — 258N000003 HC RX IP 258 OP 636: Performed by: EMERGENCY MEDICINE

## 2023-09-15 PROCEDURE — 96361 HYDRATE IV INFUSION ADD-ON: CPT

## 2023-09-15 RX ORDER — KETOROLAC TROMETHAMINE 15 MG/ML
15 INJECTION, SOLUTION INTRAMUSCULAR; INTRAVENOUS ONCE
Status: COMPLETED | OUTPATIENT
Start: 2023-09-15 | End: 2023-09-15

## 2023-09-15 RX ORDER — ONDANSETRON 2 MG/ML
4 INJECTION INTRAMUSCULAR; INTRAVENOUS EVERY 30 MIN PRN
Status: DISCONTINUED | OUTPATIENT
Start: 2023-09-15 | End: 2023-09-16 | Stop reason: HOSPADM

## 2023-09-15 RX ADMIN — ONDANSETRON 4 MG: 2 INJECTION INTRAMUSCULAR; INTRAVENOUS at 23:57

## 2023-09-15 RX ADMIN — SODIUM CHLORIDE 1000 ML: 9 INJECTION, SOLUTION INTRAVENOUS at 23:45

## 2023-09-15 RX ADMIN — KETOROLAC TROMETHAMINE 15 MG: 15 INJECTION INTRAMUSCULAR; INTRAVENOUS at 23:46

## 2023-09-15 ASSESSMENT — ACTIVITIES OF DAILY LIVING (ADL): ADLS_ACUITY_SCORE: 40

## 2023-09-16 ENCOUNTER — APPOINTMENT (OUTPATIENT)
Dept: ULTRASOUND IMAGING | Facility: CLINIC | Age: 33
End: 2023-09-16
Attending: EMERGENCY MEDICINE
Payer: COMMERCIAL

## 2023-09-16 ENCOUNTER — APPOINTMENT (OUTPATIENT)
Dept: CT IMAGING | Facility: CLINIC | Age: 33
End: 2023-09-16
Attending: EMERGENCY MEDICINE
Payer: COMMERCIAL

## 2023-09-16 VITALS
HEART RATE: 88 BPM | RESPIRATION RATE: 16 BRPM | BODY MASS INDEX: 53.92 KG/M2 | TEMPERATURE: 98.1 F | SYSTOLIC BLOOD PRESSURE: 145 MMHG | HEIGHT: 62 IN | DIASTOLIC BLOOD PRESSURE: 74 MMHG | WEIGHT: 293 LBS | OXYGEN SATURATION: 97 %

## 2023-09-16 LAB
ALBUMIN SERPL BCG-MCNC: 4.3 G/DL (ref 3.5–5.2)
ALBUMIN UR-MCNC: 30 MG/DL
ALP SERPL-CCNC: 69 U/L (ref 35–104)
ALT SERPL W P-5'-P-CCNC: 19 U/L (ref 0–50)
ANION GAP SERPL CALCULATED.3IONS-SCNC: 12 MMOL/L (ref 7–15)
APPEARANCE UR: CLEAR
AST SERPL W P-5'-P-CCNC: 20 U/L (ref 0–45)
BASOPHILS # BLD AUTO: 0.1 10E3/UL (ref 0–0.2)
BASOPHILS NFR BLD AUTO: 1 %
BILIRUB SERPL-MCNC: 0.2 MG/DL
BILIRUB UR QL STRIP: NEGATIVE
BUN SERPL-MCNC: 9.9 MG/DL (ref 6–20)
CALCIUM SERPL-MCNC: 9.6 MG/DL (ref 8.6–10)
CHLORIDE SERPL-SCNC: 105 MMOL/L (ref 98–107)
COLOR UR AUTO: ABNORMAL
CREAT SERPL-MCNC: 0.77 MG/DL (ref 0.51–0.95)
DEPRECATED HCO3 PLAS-SCNC: 24 MMOL/L (ref 22–29)
EGFRCR SERPLBLD CKD-EPI 2021: >90 ML/MIN/1.73M2
EOSINOPHIL # BLD AUTO: 0.2 10E3/UL (ref 0–0.7)
EOSINOPHIL NFR BLD AUTO: 2 %
ERYTHROCYTE [DISTWIDTH] IN BLOOD BY AUTOMATED COUNT: 13.2 % (ref 10–15)
GLUCOSE SERPL-MCNC: 114 MG/DL (ref 70–99)
GLUCOSE UR STRIP-MCNC: NEGATIVE MG/DL
HCG INTACT+B SERPL-ACNC: <1 MIU/ML
HCT VFR BLD AUTO: 40.5 % (ref 35–47)
HGB BLD-MCNC: 13.2 G/DL (ref 11.7–15.7)
HGB UR QL STRIP: ABNORMAL
IMM GRANULOCYTES # BLD: 0 10E3/UL
IMM GRANULOCYTES NFR BLD: 0 %
KETONES UR STRIP-MCNC: NEGATIVE MG/DL
LEUKOCYTE ESTERASE UR QL STRIP: NEGATIVE
LYMPHOCYTES # BLD AUTO: 2.1 10E3/UL (ref 0.8–5.3)
LYMPHOCYTES NFR BLD AUTO: 21 %
MCH RBC QN AUTO: 30.1 PG (ref 26.5–33)
MCHC RBC AUTO-ENTMCNC: 32.6 G/DL (ref 31.5–36.5)
MCV RBC AUTO: 93 FL (ref 78–100)
MONOCYTES # BLD AUTO: 0.7 10E3/UL (ref 0–1.3)
MONOCYTES NFR BLD AUTO: 7 %
MUCOUS THREADS #/AREA URNS LPF: PRESENT /LPF
NEUTROPHILS # BLD AUTO: 6.7 10E3/UL (ref 1.6–8.3)
NEUTROPHILS NFR BLD AUTO: 69 %
NITRATE UR QL: NEGATIVE
NRBC # BLD AUTO: 0 10E3/UL
NRBC BLD AUTO-RTO: 0 /100
PH UR STRIP: 6 [PH] (ref 5–7)
PLATELET # BLD AUTO: 298 10E3/UL (ref 150–450)
POTASSIUM SERPL-SCNC: 3.6 MMOL/L (ref 3.4–5.3)
PROT SERPL-MCNC: 6.8 G/DL (ref 6.4–8.3)
RBC # BLD AUTO: 4.38 10E6/UL (ref 3.8–5.2)
RBC URINE: 3 /HPF
SODIUM SERPL-SCNC: 141 MMOL/L (ref 136–145)
SP GR UR STRIP: 1.01 (ref 1–1.03)
SQUAMOUS EPITHELIAL: 1 /HPF
UROBILINOGEN UR STRIP-MCNC: <2 MG/DL
WBC # BLD AUTO: 9.8 10E3/UL (ref 4–11)
WBC URINE: 1 /HPF

## 2023-09-16 PROCEDURE — 250N000011 HC RX IP 250 OP 636: Performed by: EMERGENCY MEDICINE

## 2023-09-16 PROCEDURE — 76830 TRANSVAGINAL US NON-OB: CPT

## 2023-09-16 PROCEDURE — 74177 CT ABD & PELVIS W/CONTRAST: CPT

## 2023-09-16 PROCEDURE — 96375 TX/PRO/DX INJ NEW DRUG ADDON: CPT

## 2023-09-16 PROCEDURE — 81001 URINALYSIS AUTO W/SCOPE: CPT | Performed by: EMERGENCY MEDICINE

## 2023-09-16 PROCEDURE — 96376 TX/PRO/DX INJ SAME DRUG ADON: CPT

## 2023-09-16 PROCEDURE — 96361 HYDRATE IV INFUSION ADD-ON: CPT

## 2023-09-16 RX ORDER — HYDROMORPHONE HYDROCHLORIDE 1 MG/ML
0.5 INJECTION, SOLUTION INTRAMUSCULAR; INTRAVENOUS; SUBCUTANEOUS EVERY 30 MIN PRN
Status: COMPLETED | OUTPATIENT
Start: 2023-09-16 | End: 2023-09-16

## 2023-09-16 RX ORDER — IOPAMIDOL 755 MG/ML
100 INJECTION, SOLUTION INTRAVASCULAR ONCE
Status: COMPLETED | OUTPATIENT
Start: 2023-09-16 | End: 2023-09-16

## 2023-09-16 RX ADMIN — HYDROMORPHONE HYDROCHLORIDE 0.5 MG: 1 INJECTION, SOLUTION INTRAMUSCULAR; INTRAVENOUS; SUBCUTANEOUS at 01:00

## 2023-09-16 RX ADMIN — HYDROMORPHONE HYDROCHLORIDE 0.5 MG: 1 INJECTION, SOLUTION INTRAMUSCULAR; INTRAVENOUS; SUBCUTANEOUS at 03:57

## 2023-09-16 RX ADMIN — HYDROMORPHONE HYDROCHLORIDE 0.5 MG: 1 INJECTION, SOLUTION INTRAMUSCULAR; INTRAVENOUS; SUBCUTANEOUS at 02:07

## 2023-09-16 RX ADMIN — IOPAMIDOL 100 ML: 755 INJECTION, SOLUTION INTRAVENOUS at 01:08

## 2023-09-16 RX ADMIN — ONDANSETRON 4 MG: 2 INJECTION INTRAMUSCULAR; INTRAVENOUS at 03:56

## 2023-09-16 ASSESSMENT — ACTIVITIES OF DAILY LIVING (ADL)
ADLS_ACUITY_SCORE: 40

## 2023-09-16 NOTE — ED TRIAGE NOTES
Pt is here with abdominal pain that has been going on for a week.  Pt states that she broke her sobriety of 6 mths of self harm by sticking a object up her rectum last week, then the pain started. Pt states that the object did come out by itself.  Pt had 600 mg of ibuprofen and Toradol this evening by staff       Triage Assessment       Row Name 09/15/23 0525       Triage Assessment (Adult)    Airway WDL WDL       Respiratory WDL    Respiratory WDL WDL       Skin Circulation/Temperature WDL    Skin Circulation/Temperature WDL WDL       Cardiac WDL    Cardiac WDL WDL       Peripheral/Neurovascular WDL    Peripheral Neurovascular WDL WDL       Cognitive/Neuro/Behavioral WDL    Cognitive/Neuro/Behavioral WDL WDL

## 2023-09-16 NOTE — ED NOTES
Pt ambulated to restroom independently, is dressed in street clothes and orange juice given to pt. Call light within reach.

## 2023-09-16 NOTE — ED NOTES
Discharge instructions discussed with pt and all questions answered. Pt agreeable with discharge plan of care. VSS. Pt ambulatory. Transport arranged ETA 0630. Writer attempted to call legal guardian with no response. Writer gave pt report to Wander at the  all questions answered and agreeable with discharge plan of care.

## 2023-09-16 NOTE — ED PROVIDER NOTES
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 Cano MD;  Location: [facility]    SIGMOIDOSCOPY FLEXIBLE N/A 2/20/2019    Procedure: Exam under anesthesia Colonoscopy with attempted  removal of rectal foreign body;  Surgeon: Estrada Chávez MD;  Location:  OR           CURRENT MEDICATIONS:    Current  Facility-Administered Medications   Medication    ondansetron (ZOFRAN) injection 4 mg     Current Outpatient Medications   Medication    albuterol (PROAIR HFA/PROVENTIL HFA/VENTOLIN HFA) 108 (90 Base) MCG/ACT inhaler    albuterol (PROVENTIL) (2.5 MG/3ML) 0.083% neb solution    BANOPHEN 2-0.1 % external cream    brexpiprazole (REXULTI) 2 MG tablet    cetirizine (ZYRTEC) 10 MG tablet    Cholecalciferol (D3 HIGH POTENCY) 25 MCG (1000 UT) CAPS    clonazePAM (KLONOPIN) 0.5 MG tablet    cyclobenzaprine (FLEXERIL) 10 MG tablet    desvenlafaxine (PRISTIQ) 100 MG 24 hr tablet    ferrous sulfate (FEROSUL) 325 (65 Fe) MG tablet    fluocinonide (LIDEX) 0.05 % external cream    furosemide (LASIX) 20 MG tablet    gabapentin 8 % in PLO cream    hydroxychloroquine (PLAQUENIL) 200 MG tablet    ibuprofen (ADVIL/MOTRIN) 600 MG tablet    ketorolac (TORADOL) 10 MG tablet    metFORMIN (GLUCOPHAGE XR) 500 MG 24 hr tablet    montelukast (SINGULAIR) 10 MG tablet    nabumetone (RELAFEN) 750 MG tablet    omeprazole (PRILOSEC) 40 MG DR capsule    ondansetron (ZOFRAN-ODT) 4 MG ODT tab    pregabalin (LYRICA) 100 MG capsule    Respiratory Therapy Supplies (NEBULIZER) BRENDAN    saline nasal (AYR SALINE) GEL topical gel    Semaglutide 3 MG TABS    sodium chloride (OCEAN) 0.65 % nasal spray    SUMAtriptan (IMITREX) 25 MG tablet    valACYclovir (VALTREX) 1000 mg tablet         ALLERGIES:  Allergies   Allergen Reactions    Amoxicillin-Pot Clavulanate Other (See Comments), Swelling and Rash     PN: facial swelling, left side. Also had cortisone injection the same day as she started the Augmentin.  Noted in downtime recovery of chart.    PN: facial swelling, left side. Also had cortisone injection the same day as she started the Augmentin.; HUT Comment: PN: facial swelling, left side. Also had cortisone injection the same day as she started the Augmentin.Noted in downtime recovery of chart.; HUT Reaction: Rash; HUT Reaction: Unknown; HUT Reaction Type:  Allergy; HUT Severity: Med; HUT Noted: [id number]  PN: facial swelling, left side. Also had cortisone injection the same day as she started the Augmentin.  Other reaction(s): *Unknown  PN: facial swelling, left side. Also had cortisone injection the same day as she started the Augmentin.  Noted in downtime recovery of chart.  PN: facial swelling, left side. Also had cortisone injection the same day as she started the Augmentin.  Other reaction(s): Facial swelling  Other reaction(s): Facial swelling    Hydrocodone Nausea and Vomiting and GI Disturbance     vomiting for days, PN: vomiting for days; HUT Comment: vomiting for days; HUT Reaction: Gastrointestinal; HUT Reaction: Nausea And Vomiting; HUT Reaction Type: Intolerance; HUT Severity: Med; HUT Noted: [id number]  vomiting for days    Other reaction(s): Rash    Hydrocodone-Acetaminophen Nausea and Vomiting and Rash     Update on 12/12  Pt says she can take tylenol just not the hydrocodone.   Other reaction(s): Rash      Influenza Vaccines Other (See Comments) and Nausea and Vomiting     HUT Reaction: Nausea And Vomiting; HUT Reaction Type: Intolerance; HUT Severity: Low; HUT Noted: [id number]    Latex Rash     HUT Reaction: Rash; HUT Reaction Type: Allergy; HUT Severity: Low; HUT Noted: [id number]  Other reaction(s): Rash      Oseltamivir Hives     med stopped, PN: med stopped  med stopped, PN: med stopped; HUT Comment: med stopped, PN: med stopped; HUT Reaction: Hives; HUT Reaction Type: Allergy; HUT Severity: Med; HUT Noted: [id number]    Penicillins Anaphylaxis     HUT Reaction: Anaphylaxis; HUT Reaction Type: Allergy; HUT Severity: High; HUT Noted: [id number]    Vancomycin Itching, Swelling and Rash     Other reaction(s): Redness  Flushed face, very itchy; HUT Comment: Flushed face, very itchy; HUT Reaction: Angioedema; HUT Reaction: Redness; HUT Severity: Med; HUT Noted: [id number]    facial    Blood-Group Specific Substance Other (See Comments)     Patient has an anti-Cw  and non-specific antibodies. Blood product orders may be delayed. Draw one red top and two purple top tubes for all type/screen/crossmatch orders.  Patient has anti-Cw, anti-K ([place]), Warm auto and nonspecific antibodies. Blood products may be delayed. Draw patient 24 hours prior to transfusion. Draw one red top and two purple top tubes for all type and screen orders.    Clavulanic Acid Angioedema    Fentanyl Itching    Haemophilus B Polysaccharide Vaccine Nausea and Vomiting    Naltrexone Other (See Comments)     Reaction(s): Vivid dreams.    Other Drug Allergy (See Comments)      See original file MRN [id number]. Files are marked for merge    Oxycodone Swelling    Adhesive Tape Rash     Silicone type  Silicone type    Other reaction(s): adhesive allergy  Other reaction(s): adhesive allergy  Silicone type    Other reaction(s): adhesive allergy      Band-Aid Anti-Itch      Other reaction(s): adhesive allergy    Cephalosporins Rash    Lamotrigine Rash     Possibly associated with Lamictal.   HUT Comment: Possibly associated with Lamictal. ; HUT Reaction: Rash; HUT Reaction Type: Allergy; HUT Severity: Low; HUT Noted: [id number]    No Clinical Screening - See Comments Rash and Other (See Comments)     Silicone type  Silicone type  See original file MRN [id number]. Files are marked for merge  History of swallowing sharp metallic objects. She should not be prescribed lancets due to posed risk of swallowing.        FAMILY HISTORY:  Family History   Problem Relation Age of Onset    Diabetes Type 2  Maternal Grandmother     Diabetes Type 2  Paternal Grandmother     Breast Cancer Paternal Grandmother     Cerebrovascular Disease Father 53    Myocardial Infarction No family hx of     Coronary Artery Disease Early Onset No family hx of     Ovarian Cancer No family hx of     Colon Cancer No family hx of     Depression Mother     Anxiety Disorder Mother        SOCIAL HISTORY:   Social History     Socioeconomic History    Marital  "status: Single   Occupational History    Occupation: On disability   Tobacco Use    Smoking status: Never    Smokeless tobacco: Never   Substance and Sexual Activity    Alcohol use: No     Alcohol/week: 0.0 standard drinks of alcohol    Drug use: No    Sexual activity: Not Currently     Partners: Male     Birth control/protection: I.U.D.     Comment: IUD - Mirena - placed July, 2015   Social History Narrative    Single.    Living in independent living portion of [employer].    On disability.    No regular exercise.        VITALS:  /59   Pulse 92   Temp 98.1  F (36.7  C) (Oral)   Resp 18   Ht 1.575 m (5' 2\")   Wt 140.2 kg (309 lb)   SpO2 99%   BMI 56.52 kg/m      PHYSICAL EXAM    Physical Exam  Vitals and nursing note reviewed.   Constitutional:       General: She is not in acute distress.     Appearance: She is not diaphoretic.   HENT:      Head: Atraumatic.      Mouth/Throat:      Pharynx: No oropharyngeal exudate.   Eyes:      General: No scleral icterus.     Pupils: Pupils are equal, round, and reactive to light.   Cardiovascular:      Rate and Rhythm: Normal rate and regular rhythm.      Heart sounds: Normal heart sounds.   Pulmonary:      Effort: No respiratory distress.      Breath sounds: Normal breath sounds.   Abdominal:      Palpations: Abdomen is soft.      Tenderness: There is generalized abdominal tenderness. There is no guarding or rebound. Negative signs include Shepard's sign.   Musculoskeletal:         General: No tenderness.   Skin:     General: Skin is warm.      Findings: No rash.   Neurological:      General: No focal deficit present.      Mental Status: She is alert.           LAB:  All pertinent labs reviewed and interpreted.  Labs Ordered and Resulted from Time of ED Arrival to Time of ED Departure   COMPREHENSIVE METABOLIC PANEL - Abnormal       Result Value    Sodium 141      Potassium 3.6      Chloride 105      Carbon Dioxide (CO2) 24      Anion Gap 12      Urea " Nitrogen 9.9      Creatinine 0.77      Calcium 9.6      Glucose 114 (*)     Alkaline Phosphatase 69      AST 20      ALT 19      Protein Total 6.8      Albumin 4.3      Bilirubin Total 0.2      GFR Estimate >90     ROUTINE UA WITH MICROSCOPIC REFLEX TO CULTURE - Abnormal    Color Urine Light Yellow      Appearance Urine Clear      Glucose Urine Negative      Bilirubin Urine Negative      Ketones Urine Negative      Specific Gravity Urine 1.010      Blood Urine 0.1 mg/dL (*)     pH Urine 6.0      Protein Albumin Urine 30 (*)     Urobilinogen Urine <2.0      Nitrite Urine Negative      Leukocyte Esterase Urine Negative      Mucus Urine Present (*)     RBC Urine 3 (*)     WBC Urine 1      Squamous Epithelials Urine 1     HCG QUANTITATIVE PREGNANCY - Normal    hCG Quantitative <1     CBC WITH PLATELETS AND DIFFERENTIAL    WBC Count 9.8      RBC Count 4.38      Hemoglobin 13.2      Hematocrit 40.5      MCV 93      MCH 30.1      MCHC 32.6      RDW 13.2      Platelet Count 298      % Neutrophils 69      % Lymphocytes 21      % Monocytes 7      % Eosinophils 2      % Basophils 1      % Immature Granulocytes 0      NRBCs per 100 WBC 0      Absolute Neutrophils 6.7      Absolute Lymphocytes 2.1      Absolute Monocytes 0.7      Absolute Eosinophils 0.2      Absolute Basophils 0.1      Absolute Immature Granulocytes 0.0      Absolute NRBCs 0.0         RADIOLOGY:  Reviewed all pertinent imaging. Please see official radiology report.  US Pelvic Complete with Transvaginal   Final Result   IMPRESSION:   1.  Left ovary obscured by overlying bowel gas.      2.  IUD appears in appropriate location within the endometrium.      3.  Otherwise negative pelvic ultrasound.      CT Abdomen Pelvis w Contrast   Final Result   IMPRESSION:    1.  No acute process in the abdomen or pelvis. No evidence of foreign body.            I, Norma Fan, am serving as a scribe to document services personally performed by Dr. Efraín Macedo, based on my  observation and the provider's statements to me. I, Efraín Macedo MD attest that Norma Mita is acting in a scribe capacity, has observed my performance of the services and has documented them in accordance with my direction.    Efraín Macedo M.D.  Emergency Medicine  [facility] EMERGENCY ROOM  [address] [address]  [phone]  Dept: [phone]       Efraín Macedo MD  09/16/23 0500

## 2023-09-19 ENCOUNTER — MEDICAL CORRESPONDENCE (OUTPATIENT)
Dept: HEALTH INFORMATION MANAGEMENT | Facility: CLINIC | Age: 33
End: 2023-09-19

## 2023-09-22 ENCOUNTER — TELEPHONE (OUTPATIENT)
Dept: ENDOCRINOLOGY | Facility: CLINIC | Age: 33
End: 2023-09-22
Payer: COMMERCIAL

## 2023-09-22 NOTE — TELEPHONE ENCOUNTER
PA Initiation    Medication: WEGOVY 0.25 MG/0.5ML SC SOAJ  Insurance Company: [employer] - Phone [phone] Fax [phone]  Pharmacy Filling the Rx:    Filling Pharmacy Phone:    Filling Pharmacy Fax:    Start Date: 9/22/2023

## 2023-09-23 ENCOUNTER — APPOINTMENT (OUTPATIENT)
Dept: RADIOLOGY | Facility: CLINIC | Age: 33
End: 2023-09-23
Attending: EMERGENCY MEDICINE
Payer: COMMERCIAL

## 2023-09-23 ENCOUNTER — HOSPITAL ENCOUNTER (EMERGENCY)
Facility: CLINIC | Age: 33
Discharge: HOME | End: 2023-09-24
Attending: EMERGENCY MEDICINE | Admitting: EMERGENCY MEDICINE
Payer: COMMERCIAL

## 2023-09-23 DIAGNOSIS — R07.89 ATYPICAL CHEST PAIN: ICD-10-CM

## 2023-09-23 LAB
ANION GAP SERPL CALCULATED.3IONS-SCNC: 14 MMOL/L (ref 7–15)
ATRIAL RATE - MUSE: 82 BPM
BASOPHILS # BLD AUTO: 0 10E3/UL (ref 0–0.2)
BASOPHILS NFR BLD AUTO: 0 %
BUN SERPL-MCNC: 8.8 MG/DL (ref 6–20)
CALCIUM SERPL-MCNC: 9.5 MG/DL (ref 8.6–10)
CHLORIDE SERPL-SCNC: 105 MMOL/L (ref 98–107)
CREAT SERPL-MCNC: 0.63 MG/DL (ref 0.51–0.95)
D DIMER PPP FEU-MCNC: 0.34 UG/ML FEU (ref 0–0.5)
DEPRECATED HCO3 PLAS-SCNC: 21 MMOL/L (ref 22–29)
DIASTOLIC BLOOD PRESSURE - MUSE: 70 MMHG
EGFRCR SERPLBLD CKD-EPI 2021: >90 ML/MIN/1.73M2
EOSINOPHIL # BLD AUTO: 0.2 10E3/UL (ref 0–0.7)
EOSINOPHIL NFR BLD AUTO: 2 %
ERYTHROCYTE [DISTWIDTH] IN BLOOD BY AUTOMATED COUNT: 13.3 % (ref 10–15)
GLUCOSE SERPL-MCNC: 128 MG/DL (ref 70–99)
HCT VFR BLD AUTO: 43.1 % (ref 35–47)
HGB BLD-MCNC: 14.3 G/DL (ref 11.7–15.7)
HOLD SPECIMEN: NORMAL
IMM GRANULOCYTES # BLD: 0 10E3/UL
IMM GRANULOCYTES NFR BLD: 0 %
INTERPRETATION ECG - MUSE: NORMAL
LYMPHOCYTES # BLD AUTO: 1.9 10E3/UL (ref 0.8–5.3)
LYMPHOCYTES NFR BLD AUTO: 22 %
MCH RBC QN AUTO: 29.9 PG (ref 26.5–33)
MCHC RBC AUTO-ENTMCNC: 33.2 G/DL (ref 31.5–36.5)
MCV RBC AUTO: 90 FL (ref 78–100)
MONOCYTES # BLD AUTO: 0.6 10E3/UL (ref 0–1.3)
MONOCYTES NFR BLD AUTO: 7 %
NEUTROPHILS # BLD AUTO: 6.1 10E3/UL (ref 1.6–8.3)
NEUTROPHILS NFR BLD AUTO: 69 %
NRBC # BLD AUTO: 0 10E3/UL
NRBC BLD AUTO-RTO: 0 /100
P AXIS - MUSE: 42 DEGREES
PLATELET # BLD AUTO: 262 10E3/UL (ref 150–450)
POTASSIUM SERPL-SCNC: 4.6 MMOL/L (ref 3.4–5.3)
PR INTERVAL - MUSE: 156 MS
QRS DURATION - MUSE: 80 MS
QT - MUSE: 356 MS
QTC - MUSE: 415 MS
R AXIS - MUSE: 77 DEGREES
RBC # BLD AUTO: 4.79 10E6/UL (ref 3.8–5.2)
SODIUM SERPL-SCNC: 140 MMOL/L (ref 136–145)
SYSTOLIC BLOOD PRESSURE - MUSE: 144 MMHG
T AXIS - MUSE: 16 DEGREES
TROPONIN T SERPL HS-MCNC: 10 NG/L
VENTRICULAR RATE- MUSE: 82 BPM
WBC # BLD AUTO: 8.8 10E3/UL (ref 4–11)

## 2023-09-23 PROCEDURE — 96375 TX/PRO/DX INJ NEW DRUG ADDON: CPT

## 2023-09-23 PROCEDURE — 96374 THER/PROPH/DIAG INJ IV PUSH: CPT

## 2023-09-23 PROCEDURE — 250N000011 HC RX IP 250 OP 636: Performed by: EMERGENCY MEDICINE

## 2023-09-23 PROCEDURE — 36415 COLL VENOUS BLD VENIPUNCTURE: CPT | Performed by: EMERGENCY MEDICINE

## 2023-09-23 PROCEDURE — 84484 ASSAY OF TROPONIN QUANT: CPT | Performed by: EMERGENCY MEDICINE

## 2023-09-23 PROCEDURE — 99285 EMERGENCY DEPT VISIT HI MDM: CPT | Mod: 25

## 2023-09-23 PROCEDURE — 93005 ELECTROCARDIOGRAM TRACING: CPT | Performed by: EMERGENCY MEDICINE

## 2023-09-23 PROCEDURE — 85379 FIBRIN DEGRADATION QUANT: CPT | Performed by: EMERGENCY MEDICINE

## 2023-09-23 PROCEDURE — 80048 BASIC METABOLIC PNL TOTAL CA: CPT | Performed by: EMERGENCY MEDICINE

## 2023-09-23 PROCEDURE — 71045 X-RAY EXAM CHEST 1 VIEW: CPT

## 2023-09-23 PROCEDURE — 85025 COMPLETE CBC W/AUTO DIFF WBC: CPT | Performed by: EMERGENCY MEDICINE

## 2023-09-23 RX ORDER — ONDANSETRON 2 MG/ML
4 INJECTION INTRAMUSCULAR; INTRAVENOUS ONCE
Status: COMPLETED | OUTPATIENT
Start: 2023-09-23 | End: 2023-09-23

## 2023-09-23 RX ORDER — HYDROMORPHONE HYDROCHLORIDE 1 MG/ML
0.5 INJECTION, SOLUTION INTRAMUSCULAR; INTRAVENOUS; SUBCUTANEOUS ONCE
Status: COMPLETED | OUTPATIENT
Start: 2023-09-23 | End: 2023-09-23

## 2023-09-23 RX ORDER — KETOROLAC TROMETHAMINE 30 MG/ML
30 INJECTION, SOLUTION INTRAMUSCULAR; INTRAVENOUS ONCE
Status: COMPLETED | OUTPATIENT
Start: 2023-09-23 | End: 2023-09-23

## 2023-09-23 RX ADMIN — ONDANSETRON 4 MG: 2 INJECTION INTRAMUSCULAR; INTRAVENOUS at 22:12

## 2023-09-23 RX ADMIN — HYDROMORPHONE HYDROCHLORIDE 0.5 MG: 1 INJECTION, SOLUTION INTRAMUSCULAR; INTRAVENOUS; SUBCUTANEOUS at 22:57

## 2023-09-23 RX ADMIN — KETOROLAC TROMETHAMINE 30 MG: 30 INJECTION, SOLUTION INTRAMUSCULAR; INTRAVENOUS at 22:12

## 2023-09-23 ASSESSMENT — ENCOUNTER SYMPTOMS
SHORTNESS OF BREATH: 1
COUGH: 0

## 2023-09-23 ASSESSMENT — ACTIVITIES OF DAILY LIVING (ADL)
ADLS_ACUITY_SCORE: 40
ADLS_ACUITY_SCORE: 40

## 2023-09-24 VITALS
RESPIRATION RATE: 15 BRPM | TEMPERATURE: 98.4 F | SYSTOLIC BLOOD PRESSURE: 128 MMHG | OXYGEN SATURATION: 96 % | DIASTOLIC BLOOD PRESSURE: 83 MMHG | HEART RATE: 78 BPM

## 2023-09-24 PROCEDURE — 250N000011 HC RX IP 250 OP 636: Performed by: EMERGENCY MEDICINE

## 2023-09-24 PROCEDURE — 96376 TX/PRO/DX INJ SAME DRUG ADON: CPT

## 2023-09-24 RX ORDER — HYDROMORPHONE HYDROCHLORIDE 1 MG/ML
0.3 INJECTION, SOLUTION INTRAMUSCULAR; INTRAVENOUS; SUBCUTANEOUS ONCE
Status: COMPLETED | OUTPATIENT
Start: 2023-09-24 | End: 2023-09-24

## 2023-09-24 RX ADMIN — HYDROMORPHONE HYDROCHLORIDE 0.3 MG: 1 INJECTION, SOLUTION INTRAMUSCULAR; INTRAVENOUS; SUBCUTANEOUS at 00:20

## 2023-09-24 NOTE — ED NOTES
Bed: [place] [address]  Expected date:   Expected time:   Means of arrival: Ambulance  Comments:  [facility]  33 y/o F from Group home  Chest wall pain that increases with deep breath  VSS

## 2023-09-24 NOTE — ED TRIAGE NOTES
Pt arrives via ems after having left lower sided chest pain under left breast, feels somewhat nauseous, and feels sob. Vitals stable. Pt comes from [facility] in [place].      Triage Assessment       Row Name 09/23/23 2054       Triage Assessment (Adult)    Airway WDL WDL       Respiratory WDL    Respiratory WDL X;rhythm/pattern    Rhythm/Pattern, Respiratory shortness of breath  on exertion

## 2023-09-24 NOTE — ED PROVIDER NOTES
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 TRANSORAL DIAGNOSTIC N/A 8/14/2020    Procedure: ESOPHAGOGASTRODUODENOSCOPY (EGD) FOREIGN BODY REMOVAL;  Surgeon: Jeffy Zuñiga MD;  Location: [facility] OR;  Service: Gastroenterology    IL ESOPHAGOGASTRODUODENOSCOPY TRANSORAL DIAGNOSTIC N/A 2/25/2021    Procedure:  ESOPHAGOGASTRODUODENOSCOPY (EGD) with foreign body reoval;  Surgeon: Bird Sethi MD;  Location: [facility];  Service: Gastroenterology    [place] ESOPHAGOGASTRODUODENOSCOPY TRANSORAL DIAGNOSTIC N/A 4/19/2021    Procedure: ESOPHAGOGASTRODUODENOSCOPY (EGD);  Surgeon: Libia Rose MD;  Location: [facility] OR;  Service: Gastroenterology    [place] SURG DIAGNOSTIC EXAM, ANORECTAL N/A 2/5/2020    Procedure: EXAM UNDER ANESTHESIA, Flexible Sigmoidoscopy, Retrieval of Foreign Body;  Surgeon: Sasha Ivan MD;  Location: [facility] OR;  Service: General    RELEASE CARPAL TUNNEL Bilateral     RELEASE CARPAL TUNNEL Bilateral     REMOVAL, FOREIGN BODY, RECTUM N/A 7/21/2021    Procedure: MANUAL RETREIVALOF FOREIGN OBJECT- RECTUM.;  Surgeon: Aleksandra Gerber MD;  Location: [facility]    SIGMOIDOSCOPY FLEXIBLE N/A 1/10/2017    Procedure: SIGMOIDOSCOPY FLEXIBLE;  Surgeon: Annmarie Haynes MD;  Location:  OR    SIGMOIDOSCOPY FLEXIBLE N/A 5/8/2018    Procedure: SIGMOIDOSCOPY FLEXIBLE;  flex sig with foreign body removal using snare and rattooth forcep;  Surgeon: Soham Cano MD;  Location: [facility]    SIGMOIDOSCOPY FLEXIBLE N/A 2/20/2019    Procedure: Exam under anesthesia Colonoscopy with attempted  removal of rectal foreign body;  Surgeon: Estrada Chávez MD;  Location:  OR       CURRENT MEDICATIONS:    No current facility-administered medications for this encounter.    Current Outpatient Medications:     albuterol (PROAIR HFA/PROVENTIL HFA/VENTOLIN HFA) 108 (90 Base) MCG/ACT inhaler, Inhale 2 puffs into the lungs every 6 hours as needed for shortness of breath / dyspnea or wheezing, Disp: 18 g, Rfl: 0    albuterol (PROVENTIL) (2.5 MG/3ML) 0.083% neb solution, Take 2.5 mg by nebulization every 6 hours as needed for shortness of breath or wheezing, Disp: , Rfl:     BANOPHEN 2-0.1 % external cream, Apply 1 applicator topically 2 times daily as needed for itching, Disp: , Rfl:     brexpiprazole (REXULTI)  2 MG tablet, Take 2 mg by mouth every evening, Disp: , Rfl:     cetirizine (ZYRTEC) 10 MG tablet, Take 1 tablet (10 mg) by mouth daily (Patient taking differently: Take 10 mg by mouth every evening), Disp: 30 tablet, Rfl: 0    Cholecalciferol (D3 HIGH POTENCY) 25 MCG (1000 UT) CAPS, Take 50 mcg by mouth daily, Disp: , Rfl:     clonazePAM (KLONOPIN) 0.5 MG tablet, Take 0.5mg daily PRN anxiety- 20 tablets per month, try to keep it to 3-4x per week, Disp: , Rfl:     cyclobenzaprine (FLEXERIL) 10 MG tablet, Take 0.5-1 tablets (5-10 mg) by mouth 3 times daily as needed for muscle spasms, Disp: 20 tablet, Rfl: 0    desvenlafaxine (PRISTIQ) 100 MG 24 hr tablet, Take 1 tablet (100 mg) by mouth daily (Patient taking differently: Take 50 mg by mouth daily), Disp: 30 tablet, Rfl: 0    ferrous sulfate (FEROSUL) 325 (65 Fe) MG tablet, Take 1 tablet (325 mg) by mouth daily (with breakfast), Disp: 30 tablet, Rfl: 0    fluocinonide (LIDEX) 0.05 % external cream, Apply 1 Application topically 2 times daily as needed Apply thinly to knee 2 times daily, Monday through Fridays, off on weekends as needed. Avoid face and skin folds., Disp: , Rfl:     furosemide (LASIX) 20 MG tablet, Take 20 mg by mouth daily, Disp: , Rfl:     gabapentin 8 % in PLO cream, Apply 1 click (0.25 g) topically every 8 hours as needed for neuropathic pain (right thigh), Disp: 30 g, Rfl: 4    hydroxychloroquine (PLAQUENIL) 200 MG tablet, Take 1 tablet (200 mg) by mouth 2 times daily, Disp: 30 tablet, Rfl: 0    ibuprofen (ADVIL/MOTRIN) 600 MG tablet, Take 1 tablet (600 mg) by mouth every 8 hours as needed for moderate pain (Patient taking differently: Take 600 mg by mouth every 8 hours as needed for moderate pain prn), Disp: 24 tablet, Rfl: 0    ketorolac (TORADOL) 10 MG tablet, Take 1 tablet (10 mg) by mouth every 6 hours as needed for moderate pain, Disp: 20 tablet, Rfl: 0    metFORMIN (GLUCOPHAGE XR) 500 MG 24 hr tablet, Take 1,000 mg by mouth daily (with  breakfast), Disp: , Rfl:     montelukast (SINGULAIR) 10 MG tablet, Take 10 mg by mouth every evening, Disp: , Rfl:     nabumetone (RELAFEN) 750 MG tablet, , Disp: , Rfl:     omeprazole (PRILOSEC) 40 MG DR capsule, Take 1 capsule (40 mg) by mouth daily, Disp: 90 capsule, Rfl: 3    ondansetron (ZOFRAN-ODT) 4 MG ODT tab, Take 1 tablet (4 mg) by mouth every 8 hours as needed for nausea, Disp: 15 tablet, Rfl: 0    pregabalin (LYRICA) 100 MG capsule, Take 1 capsule (100 mg) by mouth 3 times daily, Disp: 90 capsule, Rfl: 0    Respiratory Therapy Supplies (NEBULIZER) BRENDAN, Nebulizer device.  Albuterol nebulization every 2 hours as needed for shortness of breath or wheezing., Disp: 1 each, Rfl: 0    saline nasal (AYR SALINE) GEL topical gel, Apply into each nare 2 times daily Place in front of each side of your nose and breathe in to distribute gel twice daily., Disp: 14.1 g, Rfl: 11    Semaglutide 3 MG TABS, Take 3 mg by mouth daily before breakfast Then 7mg daily for second month. Then 14 mg daily, Disp: , Rfl:     Semaglutide-Weight Management (WEGOVY) 0.25 MG/0.5ML pen, Inject 0.25 mg Subcutaneous every 7 days, Disp: 2 mL, Rfl: 0    [START ON 10/10/2023] Semaglutide-Weight Management (WEGOVY) 0.5 MG/0.5ML pen, Inject 0.5 mg Subcutaneous every 7 days, Disp: 2 mL, Rfl: 1    sodium chloride (OCEAN) 0.65 % nasal spray, Spray 2 sprays in each side of the nose every 3 hours while awake., Disp: 44 mL, Rfl: 11    SUMAtriptan (IMITREX) 25 MG tablet, Take 25 mg by mouth at onset of headache for migraine May repeat in 2 hours., Disp: , Rfl:     valACYclovir (VALTREX) 1000 mg tablet, Take 2,000 mg by mouth 2 times daily as needed Take 2 tablets by mouth two times daily for one day. Use as needed at onset of cold sore., Disp: , Rfl:     ALLERGIES:  Allergies   Allergen Reactions    Amoxicillin-Pot Clavulanate Other (See Comments), Swelling and Rash     PN: facial swelling, left side. Also had cortisone injection the same day as she  started the Augmentin.  Noted in downtime recovery of chart.    PN: facial swelling, left side. Also had cortisone injection the same day as she started the Augmentin.; HUT Comment: PN: facial swelling, left side. Also had cortisone injection the same day as she started the Augmentin.Noted in downtime recovery of chart.; HUT Reaction: Rash; HUT Reaction: Unknown; HUT Reaction Type: Allergy; HUT Severity: Med; HUT Noted: [id number]  PN: facial swelling, left side. Also had cortisone injection the same day as she started the Augmentin.  Other reaction(s): *Unknown  PN: facial swelling, left side. Also had cortisone injection the same day as she started the Augmentin.  Noted in downtime recovery of chart.  PN: facial swelling, left side. Also had cortisone injection the same day as she started the Augmentin.  Other reaction(s): Facial swelling  Other reaction(s): Facial swelling    Hydrocodone Nausea and Vomiting and GI Disturbance     vomiting for days, PN: vomiting for days; HUT Comment: vomiting for days; HUT Reaction: Gastrointestinal; HUT Reaction: Nausea And Vomiting; HUT Reaction Type: Intolerance; HUT Severity: Med; HUT Noted: [id number]  vomiting for days    Other reaction(s): Rash    Hydrocodone-Acetaminophen Nausea and Vomiting and Rash     Update on 12/12  Pt says she can take tylenol just not the hydrocodone.   Other reaction(s): Rash      Influenza Vaccines Other (See Comments) and Nausea and Vomiting     HUT Reaction: Nausea And Vomiting; HUT Reaction Type: Intolerance; HUT Severity: Low; HUT Noted: 20170416    Latex Rash     HUT Reaction: Rash; HUT Reaction Type: Allergy; HUT Severity: Low; HUT Noted: [id number]  Other reaction(s): Rash      Oseltamivir Hives     med stopped, PN: med stopped  med stopped, PN: med stopped; HUT Comment: med stopped, PN: med stopped; HUT Reaction: Hives; HUT Reaction Type: Allergy; HUT Severity: Med; HUT Noted: 20170109    Penicillins Anaphylaxis     HUT Reaction: Anaphylaxis;  HUT Reaction Type: Allergy; HUT Severity: High; HUT Noted: 20150904    Vancomycin Itching, Swelling and Rash     Other reaction(s): Redness  Flushed face, very itchy; HUT Comment: Flushed face, very itchy; HUT Reaction: Angioedema; HUT Reaction: Redness; HUT Severity: Med; HUT Noted: 20190626    facial    Blood-Group Specific Substance Other (See Comments)     Patient has an anti-Cw and non-specific antibodies. Blood product orders may be delayed. Draw one red top and two purple top tubes for all type/screen/crossmatch orders.  Patient has anti-Cw, anti-K (Angella), Warm auto and nonspecific antibodies. Blood products may be delayed. Draw patient 24 hours prior to transfusion. Draw one red top and two purple top tubes for all type and screen orders.    Clavulanic Acid Angioedema    Fentanyl Itching    Haemophilus B Polysaccharide Vaccine Nausea and Vomiting    Naltrexone Other (See Comments)     Reaction(s): Vivid dreams.    Other Drug Allergy (See Comments)      See original file MRN [id number]. Files are marked for merge    Oxycodone Swelling    Adhesive Tape Rash     Silicone type  Silicone type    Other reaction(s): adhesive allergy  Other reaction(s): adhesive allergy  Silicone type    Other reaction(s): adhesive allergy      Band-Aid Anti-Itch      Other reaction(s): adhesive allergy    Cephalosporins Rash    Lamotrigine Rash     Possibly associated with Lamictal.   HUT Comment: Possibly associated with Lamictal. ; HUT Reaction: Rash; HUT Reaction Type: Allergy; HUT Severity: Low; HUT Noted: 20180307    No Clinical Screening - See Comments Rash and Other (See Comments)     Silicone type  Silicone type  See original file MRN [id number]. Files are marked for merge  History of swallowing sharp metallic objects. She should not be prescribed lancets due to posed risk of swallowing.        FAMILY HISTORY:  Family History   Problem Relation Age of Onset    Diabetes Type 2  Maternal Grandmother     Diabetes Type 2   Paternal Grandmother     Breast Cancer Paternal Grandmother     Cerebrovascular Disease Father 53    Myocardial Infarction No family hx of     Coronary Artery Disease Early Onset No family hx of     Ovarian Cancer No family hx of     Colon Cancer No family hx of     Depression Mother     Anxiety Disorder Mother        SOCIAL HISTORY:   Social History     Socioeconomic History    Marital status: Single   Occupational History    Occupation: On disability   Tobacco Use    Smoking status: Never    Smokeless tobacco: Never   Substance and Sexual Activity    Alcohol use: No     Alcohol/week: 0.0 standard drinks of alcohol    Drug use: No    Sexual activity: Not Currently     Partners: Male     Birth control/protection: I.U.D.     Comment: IUD - Mirena - placed July, 2015   Social History Narrative    Single.    Living in independent living portion of [employer].    On disability.    No regular exercise.        PHYSICAL EXAM:    Vitals: /62   Pulse 78   Temp 98.4  F (36.9  C) (Temporal)   Resp 15   SpO2 96%    General:. Alert and interactive, comfortable appearing.  HENT: Oropharynx without erythema or exudates. MMM.  TMs clear bilaterally.  Eyes: Pupils mid-sized and equally reactive.   Neck: Full AROM.  No midline tenderness to palpation.  Cardiovascular: Regular rate and rhythm. Peripheral pulses 2+ bilaterally.  Chest/Pulmonary: Normal work of breathing. Lung sounds clear and equal throughout, no wheezes or crackles. No chest wall tenderness or deformities.  Abdomen: Soft, nondistended. Nontender without guarding or rebound.  Back/Spine: No CVA or midline tenderness.  Extremities: Normal ROM of all major joints. No lower extremity edema.   Skin: Warm and dry. Normal skin color.   Neuro: Speech clear. CNs grossly intact. Moves all extremities appropriately. Strength and sensation grossly intact to all extremities.   Psych: Normal affect/mood, cooperative, memory appropriate.     LAB:  All pertinent  labs reviewed and interpreted.  Labs Ordered and Resulted from Time of ED Arrival to Time of ED Departure   BASIC METABOLIC PANEL - Abnormal       Result Value    Sodium 140      Potassium 4.6      Chloride 105      Carbon Dioxide (CO2) 21 (*)     Anion Gap 14      Urea Nitrogen 8.8      Creatinine 0.63      Calcium 9.5      Glucose 128 (*)     GFR Estimate >90     TROPONIN T, HIGH SENSITIVITY - Normal    Troponin T, High Sensitivity 10     D DIMER QUANTITATIVE - Normal    D-Dimer Quantitative 0.34     CBC WITH PLATELETS AND DIFFERENTIAL    WBC Count 8.8      RBC Count 4.79      Hemoglobin 14.3      Hematocrit 43.1      MCV 90      MCH 29.9      MCHC 33.2      RDW 13.3      Platelet Count 262      % Neutrophils 69      % Lymphocytes 22      % Monocytes 7      % Eosinophils 2      % Basophils 0      % Immature Granulocytes 0      NRBCs per 100 WBC 0      Absolute Neutrophils 6.1      Absolute Lymphocytes 1.9      Absolute Monocytes 0.6      Absolute Eosinophils 0.2      Absolute Basophils 0.0      Absolute Immature Granulocytes 0.0      Absolute NRBCs 0.0         RADIOLOGY:  XR Chest 1 View   Final Result   IMPRESSION: Stable cardiomediastinal contours. Elevated right hemidiaphragm. Lungs are clear. No pneumothorax. Lower thoracic levoscoliosis.          EKG:  See MDM  I have independently reviewed and interpreted the EKG(s) documented above.           I, Nevin Steiner, am serving as a scribe to document services personally performed by Dr. Grace Ambrocio  based on my observation and the provider's statements to me. I, Grace Ambrocio MD attest that Nevin Steiner is acting in a scribe capacity, has observed my performance of the services and has documented them in accordance with my direction.      Grace Ambrocio M.D.  Emergency Medicine  [facility] EMERGENCY ROOM  [address] [facility] [id number] [phone]  Dept: [phone]     Grace Ambrocio,  MD  09/24/23 0029

## 2023-09-26 NOTE — TELEPHONE ENCOUNTER
Prior Authorization Approval    Medication: WEGOVY 0.25 MG/0.5ML SC SOAJ  Authorization Effective Date: 8/23/2023  Authorization Expiration Date: 4/19/2024  Approved Dose/Quantity: 2  Reference #: [id number]   Insurance Company: [employer] - Phone [phone] Fax [phone]  Expected CoPay: $    CoPay Card Available:      Financial Assistance Needed:    Which Pharmacy is filling the prescription:    Pharmacy Notified:    Patient Notified:

## 2023-09-27 ENCOUNTER — CARE COORDINATION (OUTPATIENT)
Dept: ENDOCRINOLOGY | Facility: CLINIC | Age: 33
End: 2023-09-27
Payer: COMMERCIAL

## 2023-09-27 ENCOUNTER — MYC MEDICAL ADVICE (OUTPATIENT)
Dept: ENDOCRINOLOGY | Facility: CLINIC | Age: 33
End: 2023-09-27
Payer: COMMERCIAL

## 2023-09-27 DIAGNOSIS — E66.813 CLASS 3 SEVERE OBESITY WITH SERIOUS COMORBIDITY AND BODY MASS INDEX (BMI) OF 50.0 TO 59.9 IN ADULT, UNSPECIFIED OBESITY TYPE (H): ICD-10-CM

## 2023-09-27 DIAGNOSIS — E66.01 CLASS 3 SEVERE OBESITY WITH SERIOUS COMORBIDITY AND BODY MASS INDEX (BMI) OF 50.0 TO 59.9 IN ADULT, UNSPECIFIED OBESITY TYPE (H): ICD-10-CM

## 2023-09-27 RX ORDER — SEMAGLUTIDE 0.25 MG/.5ML
0.25 INJECTION, SOLUTION SUBCUTANEOUS
Qty: 2 ML | Refills: 0 | Status: ON HOLD | OUTPATIENT
Start: 2023-09-27 | End: 2023-10-16

## 2023-09-27 NOTE — TELEPHONE ENCOUNTER
Letter sent to patient for her group home. She will need to stop Rybelsus and start Wegovy the day after stopping the Rybelsus per Sharon Toro CNP

## 2023-09-27 NOTE — LETTER
9/27/2023       RE: Nevin ZULUAGA Jenny  [address] [facility] [address]     To whom it may concern:    Nevin is going to be starting on Wegovy. She will need to stop Rybelsus and start the Wegovy the day after stopping the Rybelsus.     Please contact my office if you have any questions.     Sincerely,    Sharon Toro, CNP  [phone]  rr

## 2023-10-01 ENCOUNTER — APPOINTMENT (OUTPATIENT)
Dept: CT IMAGING | Facility: CLINIC | Age: 33
End: 2023-10-01
Payer: COMMERCIAL

## 2023-10-01 ENCOUNTER — HOSPITAL ENCOUNTER (EMERGENCY)
Facility: CLINIC | Age: 33
Discharge: NURSING, REHAB OR LONG-TERM CARE | End: 2023-10-02
Payer: COMMERCIAL

## 2023-10-01 ENCOUNTER — APPOINTMENT (OUTPATIENT)
Dept: RADIOLOGY | Facility: CLINIC | Age: 33
End: 2023-10-01
Attending: EMERGENCY MEDICINE
Payer: COMMERCIAL

## 2023-10-01 DIAGNOSIS — S93.402A LEFT ANKLE SPRAIN: ICD-10-CM

## 2023-10-01 DIAGNOSIS — M25.572 ACUTE LEFT ANKLE PAIN: ICD-10-CM

## 2023-10-01 PROCEDURE — 73700 CT LOWER EXTREMITY W/O DYE: CPT | Mod: LT

## 2023-10-01 PROCEDURE — 96372 THER/PROPH/DIAG INJ SC/IM: CPT

## 2023-10-01 PROCEDURE — 99285 EMERGENCY DEPT VISIT HI MDM: CPT | Mod: 25

## 2023-10-01 PROCEDURE — 250N000011 HC RX IP 250 OP 636

## 2023-10-01 PROCEDURE — 73610 X-RAY EXAM OF ANKLE: CPT | Mod: LT

## 2023-10-01 PROCEDURE — 73700 CT LOWER EXTREMITY W/O DYE: CPT | Mod: LT,XS

## 2023-10-01 PROCEDURE — 73630 X-RAY EXAM OF FOOT: CPT | Mod: LT

## 2023-10-01 PROCEDURE — 250N000013 HC RX MED GY IP 250 OP 250 PS 637

## 2023-10-01 RX ORDER — HYDROMORPHONE HYDROCHLORIDE 1 MG/ML
0.5 INJECTION, SOLUTION INTRAMUSCULAR; INTRAVENOUS; SUBCUTANEOUS ONCE
Status: COMPLETED | OUTPATIENT
Start: 2023-10-01 | End: 2023-10-01

## 2023-10-01 RX ORDER — HYDROMORPHONE HYDROCHLORIDE 1 MG/ML
0.5 INJECTION, SOLUTION INTRAMUSCULAR; INTRAVENOUS; SUBCUTANEOUS ONCE
Status: DISCONTINUED | OUTPATIENT
Start: 2023-10-01 | End: 2023-10-01

## 2023-10-01 RX ORDER — HYDROMORPHONE HYDROCHLORIDE 2 MG/1
1 TABLET ORAL EVERY 6 HOURS PRN
Qty: 5 TABLET | Refills: 0 | Status: SHIPPED | OUTPATIENT
Start: 2023-10-01 | End: 2023-10-04

## 2023-10-01 RX ADMIN — HYDROMORPHONE HYDROCHLORIDE 1 MG: 2 TABLET ORAL at 23:50

## 2023-10-01 RX ADMIN — HYDROMORPHONE HYDROCHLORIDE 0.5 MG: 1 INJECTION, SOLUTION INTRAMUSCULAR; INTRAVENOUS; SUBCUTANEOUS at 22:18

## 2023-10-01 RX ADMIN — HYDROMORPHONE HYDROCHLORIDE 0.5 MG: 1 INJECTION, SOLUTION INTRAMUSCULAR; INTRAVENOUS; SUBCUTANEOUS at 21:17

## 2023-10-01 ASSESSMENT — ENCOUNTER SYMPTOMS: NUMBNESS: 1

## 2023-10-01 ASSESSMENT — ACTIVITIES OF DAILY LIVING (ADL)
ADLS_ACUITY_SCORE: 42
ADLS_ACUITY_SCORE: 42

## 2023-10-01 NOTE — ED TRIAGE NOTES
Patient tripped in room this morning and now arrives to ED with left ankle pain and swelling.  Patient reports numbness in left foot. +pedal pulses. Hit face when she fell with no obvious bruising or injury. Given 400 mg motrin by EMS and splint applied to L foot. No blood thinners.

## 2023-10-02 ENCOUNTER — HOSPITAL ENCOUNTER (EMERGENCY)
Facility: CLINIC | Age: 33
Discharge: NURSING, REHAB OR LONG-TERM CARE | End: 2023-10-02
Attending: EMERGENCY MEDICINE | Admitting: EMERGENCY MEDICINE
Payer: COMMERCIAL

## 2023-10-02 VITALS
DIASTOLIC BLOOD PRESSURE: 102 MMHG | OXYGEN SATURATION: 96 % | RESPIRATION RATE: 16 BRPM | SYSTOLIC BLOOD PRESSURE: 145 MMHG | HEART RATE: 112 BPM | TEMPERATURE: 98.4 F

## 2023-10-02 VITALS
RESPIRATION RATE: 15 BRPM | HEIGHT: 62 IN | DIASTOLIC BLOOD PRESSURE: 71 MMHG | SYSTOLIC BLOOD PRESSURE: 128 MMHG | HEART RATE: 85 BPM | WEIGHT: 292 LBS | BODY MASS INDEX: 53.73 KG/M2 | OXYGEN SATURATION: 96 % | TEMPERATURE: 97.3 F

## 2023-10-02 DIAGNOSIS — R10.84 GENERALIZED ABDOMINAL PAIN: ICD-10-CM

## 2023-10-02 LAB
ALBUMIN SERPL BCG-MCNC: 4.5 G/DL (ref 3.5–5.2)
ALBUMIN UR-MCNC: 10 MG/DL
ALP SERPL-CCNC: 70 U/L (ref 35–104)
ALT SERPL W P-5'-P-CCNC: 24 U/L (ref 0–50)
ANION GAP SERPL CALCULATED.3IONS-SCNC: 13 MMOL/L (ref 7–15)
APPEARANCE UR: CLEAR
AST SERPL W P-5'-P-CCNC: 26 U/L (ref 0–45)
BACTERIA #/AREA URNS HPF: ABNORMAL /HPF
BASO+EOS+MONOS # BLD AUTO: NORMAL 10*3/UL
BASO+EOS+MONOS NFR BLD AUTO: NORMAL %
BASOPHILS # BLD AUTO: 0 10E3/UL (ref 0–0.2)
BASOPHILS NFR BLD AUTO: 0 %
BILIRUB SERPL-MCNC: 0.3 MG/DL
BILIRUB UR QL STRIP: NEGATIVE
BUN SERPL-MCNC: 10.6 MG/DL (ref 6–20)
CALCIUM SERPL-MCNC: 9.7 MG/DL (ref 8.6–10)
CHLORIDE SERPL-SCNC: 104 MMOL/L (ref 98–107)
COLOR UR AUTO: YELLOW
CREAT SERPL-MCNC: 0.63 MG/DL (ref 0.51–0.95)
DEPRECATED HCO3 PLAS-SCNC: 24 MMOL/L (ref 22–29)
EGFRCR SERPLBLD CKD-EPI 2021: >90 ML/MIN/1.73M2
EOSINOPHIL # BLD AUTO: 0.1 10E3/UL (ref 0–0.7)
EOSINOPHIL NFR BLD AUTO: 1 %
ERYTHROCYTE [DISTWIDTH] IN BLOOD BY AUTOMATED COUNT: 13.2 % (ref 10–15)
GLUCOSE SERPL-MCNC: 104 MG/DL (ref 70–99)
GLUCOSE UR STRIP-MCNC: NEGATIVE MG/DL
HCG UR QL: NEGATIVE
HCT VFR BLD AUTO: 40.3 % (ref 35–47)
HGB BLD-MCNC: 13.6 G/DL (ref 11.7–15.7)
HGB UR QL STRIP: ABNORMAL
IMM GRANULOCYTES # BLD: 0 10E3/UL
IMM GRANULOCYTES NFR BLD: 0 %
KETONES UR STRIP-MCNC: 20 MG/DL
LEUKOCYTE ESTERASE UR QL STRIP: NEGATIVE
LIPASE SERPL-CCNC: 40 U/L (ref 13–60)
LYMPHOCYTES # BLD AUTO: 1.1 10E3/UL (ref 0.8–5.3)
LYMPHOCYTES NFR BLD AUTO: 17 %
MCH RBC QN AUTO: 30.1 PG (ref 26.5–33)
MCHC RBC AUTO-ENTMCNC: 33.7 G/DL (ref 31.5–36.5)
MCV RBC AUTO: 89 FL (ref 78–100)
MONOCYTES # BLD AUTO: 0.4 10E3/UL (ref 0–1.3)
MONOCYTES NFR BLD AUTO: 6 %
MUCOUS THREADS #/AREA URNS LPF: PRESENT /LPF
NEUTROPHILS # BLD AUTO: 5.1 10E3/UL (ref 1.6–8.3)
NEUTROPHILS NFR BLD AUTO: 76 %
NITRATE UR QL: NEGATIVE
NRBC # BLD AUTO: 0 10E3/UL
NRBC BLD AUTO-RTO: 0 /100
PH UR STRIP: 5.5 [PH] (ref 5–7)
PLATELET # BLD AUTO: 300 10E3/UL (ref 150–450)
POTASSIUM SERPL-SCNC: 4.4 MMOL/L (ref 3.4–5.3)
PROT SERPL-MCNC: 7.3 G/DL (ref 6.4–8.3)
RBC # BLD AUTO: 4.52 10E6/UL (ref 3.8–5.2)
RBC URINE: 4 /HPF
SODIUM SERPL-SCNC: 141 MMOL/L (ref 135–145)
SP GR UR STRIP: 1.02 (ref 1–1.03)
SQUAMOUS EPITHELIAL: <1 /HPF
UROBILINOGEN UR STRIP-MCNC: NORMAL MG/DL
WBC # BLD AUTO: 6.8 10E3/UL (ref 4–11)
WBC URINE: 3 /HPF

## 2023-10-02 PROCEDURE — 87086 URINE CULTURE/COLONY COUNT: CPT | Performed by: PHYSICIAN ASSISTANT

## 2023-10-02 PROCEDURE — 999N000127 HC STATISTIC PERIPHERAL IV START W US GUIDANCE

## 2023-10-02 PROCEDURE — 80053 COMPREHEN METABOLIC PANEL: CPT | Performed by: PHYSICIAN ASSISTANT

## 2023-10-02 PROCEDURE — 99283 EMERGENCY DEPT VISIT LOW MDM: CPT | Performed by: EMERGENCY MEDICINE

## 2023-10-02 PROCEDURE — 36415 COLL VENOUS BLD VENIPUNCTURE: CPT | Performed by: PHYSICIAN ASSISTANT

## 2023-10-02 PROCEDURE — 83690 ASSAY OF LIPASE: CPT | Performed by: PHYSICIAN ASSISTANT

## 2023-10-02 PROCEDURE — 99284 EMERGENCY DEPT VISIT MOD MDM: CPT | Mod: GC | Performed by: EMERGENCY MEDICINE

## 2023-10-02 PROCEDURE — 81001 URINALYSIS AUTO W/SCOPE: CPT | Performed by: PHYSICIAN ASSISTANT

## 2023-10-02 PROCEDURE — 85025 COMPLETE CBC W/AUTO DIFF WBC: CPT | Performed by: PHYSICIAN ASSISTANT

## 2023-10-02 PROCEDURE — 999N000285 HC STATISTIC VASC ACCESS LAB DRAW WITH PIV START

## 2023-10-02 PROCEDURE — 250N000013 HC RX MED GY IP 250 OP 250 PS 637: Performed by: PHYSICIAN ASSISTANT

## 2023-10-02 PROCEDURE — 81025 URINE PREGNANCY TEST: CPT | Performed by: PHYSICIAN ASSISTANT

## 2023-10-02 PROCEDURE — 250N000011 HC RX IP 250 OP 636: Performed by: PHYSICIAN ASSISTANT

## 2023-10-02 RX ORDER — ONDANSETRON 4 MG/1
4 TABLET, ORALLY DISINTEGRATING ORAL ONCE
Status: COMPLETED | OUTPATIENT
Start: 2023-10-02 | End: 2023-10-02

## 2023-10-02 RX ORDER — MAGNESIUM HYDROXIDE/ALUMINUM HYDROXICE/SIMETHICONE 120; 1200; 1200 MG/30ML; MG/30ML; MG/30ML
15 SUSPENSION ORAL ONCE
Status: COMPLETED | OUTPATIENT
Start: 2023-10-02 | End: 2023-10-02

## 2023-10-02 RX ORDER — ACETAMINOPHEN 500 MG
1000 TABLET ORAL ONCE
Status: COMPLETED | OUTPATIENT
Start: 2023-10-02 | End: 2023-10-02

## 2023-10-02 RX ADMIN — ALUMINUM HYDROXIDE, MAGNESIUM HYDROXIDE, AND SIMETHICONE 15 ML: 200; 200; 20 SUSPENSION ORAL at 13:24

## 2023-10-02 RX ADMIN — ONDANSETRON 4 MG: 4 TABLET, ORALLY DISINTEGRATING ORAL at 13:24

## 2023-10-02 RX ADMIN — ACETAMINOPHEN 1000 MG: 500 TABLET ORAL at 13:51

## 2023-10-02 ASSESSMENT — ACTIVITIES OF DAILY LIVING (ADL)
ADLS_ACUITY_SCORE: 40

## 2023-10-02 NOTE — ED TRIAGE NOTES
Threw up twice this morning coffee ground.  Zofran not helping.     Triage Assessment       Row Name 10/02/23 1210       Triage Assessment (Adult)    Airway WDL WDL       Respiratory WDL    Respiratory WDL WDL       Skin Circulation/Temperature WDL    Skin Circulation/Temperature WDL WDL       Cardiac WDL    Cardiac WDL WDL       Peripheral/Neurovascular WDL    Peripheral Neurovascular WDL WDL       Cognitive/Neuro/Behavioral WDL    Cognitive/Neuro/Behavioral WDL WDL

## 2023-10-02 NOTE — DISCHARGE INSTRUCTIONS
You were seen in the ER for left ankle pain after an injury.  You had an x-ray and CT scan done today that did not show any broken or dislocated bones.  We suspect that the swelling in your foot and ankle could be pushing on some muscles or nerves in your foot or ankle that could be causing some worsening numbness in your foot.  Please take Tylenol and ibuprofen for the pain, follow the acronym RICE: Rest, ice, compression, elevation.  You will have Dilaudid as needed for breakthrough pain to help with sleeping.  This medication can cause some drowsiness.  Use the cam walking boot provided today to help with stability with walking.  Follow-up with your primary care provider for recheck and return to the ER if you have worsening pain or any worsening numbness, tingling, fevers, vomiting or any other concerning symptoms.

## 2023-10-02 NOTE — ED PROVIDER NOTES
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 removal;  Surgeon: Felipe Connolly MD;  Location: [facility];  Service: Gastroenterology    SD ESOPHAGOGASTRODUODENOSCOPY TRANSORAL DIAGNOSTIC N/A 12/21/2019    Procedure: ESOPHAGOGASTRODUODENOSCOPY (EGD) FOR FROEIGN BODY REMOVAL;  Surgeon: Binu Vigil MD;  Location: [facility];  Service: Gastroenterology    SD ESOPHAGOGASTRODUODENOSCOPY  TRANSORAL DIAGNOSTIC N/A 7/22/2020    Procedure: ESOPHAGOGASTRODUODENOSCOPY (EGD);  Surgeon: Bailey Arnold MD;  Location: [facility];  Service: Gastroenterology    [facility] ESOPHAGOGASTRODUODENOSCOPY TRANSORAL DIAGNOSTIC N/A 8/14/2020    Procedure: ESOPHAGOGASTRODUODENOSCOPY (EGD) FOREIGN BODY REMOVAL;  Surgeon: Jeffy Zuñiga MD;  Location: [facility];  Service: Gastroenterology    VA ESOPHAGOGASTRODUODENOSCOPY TRANSORAL DIAGNOSTIC N/A 2/25/2021    Procedure: ESOPHAGOGASTRODUODENOSCOPY (EGD) with foreign body reoval;  Surgeon: Bird Sethi MD;  Location: [facility];  Service: Gastroenterology    VA ESOPHAGOGASTRODUODENOSCOPY TRANSORAL DIAGNOSTIC N/A 4/19/2021    Procedure: ESOPHAGOGASTRODUODENOSCOPY (EGD);  Surgeon: Libia Rose MD;  [place] [facility];  Service: Gastroenterology    VA SURG DIAGNOSTIC EXAM, ANORECTAL N/A 2/5/2020    Procedure: EXAM UNDER ANESTHESIA, Flexible Sigmoidoscopy, Retrieval of Foreign Body;  Surgeon: Sasha Ivan MD;  Location: [facility];  Service: General    RELEASE CARPAL TUNNEL Bilateral     RELEASE CARPAL TUNNEL Bilateral     REMOVAL, FOREIGN BODY, RECTUM N/A 7/21/2021    Procedure: MANUAL RETREIVALOF FOREIGN OBJECT- RECTUM.;  Surgeon: Aleksandra Gerber MD;  Location: [facility]    SIGMOIDOSCOPY FLEXIBLE N/A 1/10/2017    Procedure: SIGMOIDOSCOPY FLEXIBLE;  Surgeon: Annmarie Haynes MD;  Location:  OR    SIGMOIDOSCOPY FLEXIBLE N/A 5/8/2018    Procedure: SIGMOIDOSCOPY FLEXIBLE;  flex sig with foreign body removal using snare and rattooth forcep;  Surgeon: Soham Cano MD;  Location: [facility]    SIGMOIDOSCOPY FLEXIBLE N/A 2/20/2019    Procedure: Exam under anesthesia Colonoscopy with attempted  removal of rectal foreign body;  Surgeon: Estrada Chávez MD;  Location:  OR       CURRENT MEDICATIONS:      Current Facility-Administered Medications:     HYDROmorphone (DILAUDID) half-tab 1 mg, 1 mg, Oral, Q2H PRN, Torin  REUBEN Metcalf, 1 mg at 10/01/23 7692    Current Outpatient Medications:     HYDROmorphone (DILAUDID) 2 MG tablet, Take 0.5 tablets (1 mg) by mouth every 6 hours as needed for pain, Disp: 5 tablet, Rfl: 0    albuterol (PROAIR HFA/PROVENTIL HFA/VENTOLIN HFA) 108 (90 Base) MCG/ACT inhaler, Inhale 2 puffs into the lungs every 6 hours as needed for shortness of breath / dyspnea or wheezing, Disp: 18 g, Rfl: 0    albuterol (PROVENTIL) (2.5 MG/3ML) 0.083% neb solution, Take 2.5 mg by nebulization every 6 hours as needed for shortness of breath or wheezing, Disp: , Rfl:     BANOPHEN 2-0.1 % external cream, Apply 1 applicator topically 2 times daily as needed for itching, Disp: , Rfl:     brexpiprazole (REXULTI) 2 MG tablet, Take 2 mg by mouth every evening, Disp: , Rfl:     cetirizine (ZYRTEC) 10 MG tablet, Take 1 tablet (10 mg) by mouth daily (Patient taking differently: Take 10 mg by mouth every evening), Disp: 30 tablet, Rfl: 0    Cholecalciferol (D3 HIGH POTENCY) 25 MCG (1000 UT) CAPS, Take 50 mcg by mouth daily, Disp: , Rfl:     clonazePAM (KLONOPIN) 0.5 MG tablet, Take 0.5mg daily PRN anxiety- 20 tablets per month, try to keep it to 3-4x per week, Disp: , Rfl:     cyclobenzaprine (FLEXERIL) 10 MG tablet, Take 0.5-1 tablets (5-10 mg) by mouth 3 times daily as needed for muscle spasms, Disp: 20 tablet, Rfl: 0    desvenlafaxine (PRISTIQ) 100 MG 24 hr tablet, Take 1 tablet (100 mg) by mouth daily (Patient taking differently: Take 50 mg by mouth daily), Disp: 30 tablet, Rfl: 0    ferrous sulfate (FEROSUL) 325 (65 Fe) MG tablet, Take 1 tablet (325 mg) by mouth daily (with breakfast), Disp: 30 tablet, Rfl: 0    fluocinonide (LIDEX) 0.05 % external cream, Apply 1 Application topically 2 times daily as needed Apply thinly to knee 2 times daily, Monday through Fridays, off on weekends as needed. Avoid face and skin folds., Disp: , Rfl:     furosemide (LASIX) 20 MG tablet, Take 20 mg by mouth daily, Disp: , Rfl:     gabapentin  8 % in PLO cream, Apply 1 click (0.25 g) topically every 8 hours as needed for neuropathic pain (right thigh), Disp: 30 g, Rfl: 4    hydroxychloroquine (PLAQUENIL) 200 MG tablet, Take 1 tablet (200 mg) by mouth 2 times daily, Disp: 30 tablet, Rfl: 0    ibuprofen (ADVIL/MOTRIN) 600 MG tablet, Take 1 tablet (600 mg) by mouth every 8 hours as needed for moderate pain (Patient taking differently: Take 600 mg by mouth every 8 hours as needed for moderate pain prn), Disp: 24 tablet, Rfl: 0    ketorolac (TORADOL) 10 MG tablet, Take 1 tablet (10 mg) by mouth every 6 hours as needed for moderate pain, Disp: 20 tablet, Rfl: 0    metFORMIN (GLUCOPHAGE XR) 500 MG 24 hr tablet, Take 1,000 mg by mouth daily (with breakfast), Disp: , Rfl:     montelukast (SINGULAIR) 10 MG tablet, Take 10 mg by mouth every evening, Disp: , Rfl:     nabumetone (RELAFEN) 750 MG tablet, , Disp: , Rfl:     omeprazole (PRILOSEC) 40 MG DR capsule, Take 1 capsule (40 mg) by mouth daily, Disp: 90 capsule, Rfl: 3    ondansetron (ZOFRAN-ODT) 4 MG ODT tab, Take 1 tablet (4 mg) by mouth every 8 hours as needed for nausea, Disp: 15 tablet, Rfl: 0    pregabalin (LYRICA) 100 MG capsule, Take 1 capsule (100 mg) by mouth 3 times daily, Disp: 90 capsule, Rfl: 0    Respiratory Therapy Supplies (NEBULIZER) BRENDAN, Nebulizer device.  Albuterol nebulization every 2 hours as needed for shortness of breath or wheezing., Disp: 1 each, Rfl: 0    saline nasal (AYR SALINE) GEL topical gel, Apply into each nare 2 times daily Place in front of each side of your nose and breathe in to distribute gel twice daily., Disp: 14.1 g, Rfl: 11    Semaglutide 3 MG TABS, Take 3 mg by mouth daily before breakfast Then 7mg daily for second month. Then 14 mg daily, Disp: , Rfl:     Semaglutide-Weight Management (WEGOVY) 0.25 MG/0.5ML pen, Inject 0.25 mg Subcutaneous every 7 days, Disp: 2 mL, Rfl: 0    [START ON 10/10/2023] Semaglutide-Weight Management (WEGOVY) 0.5 MG/0.5ML pen, Inject 0.5 mg  Subcutaneous every 7 days, Disp: 2 mL, Rfl: 1    sodium chloride (OCEAN) 0.65 % nasal spray, Spray 2 sprays in each side of the nose every 3 hours while awake., Disp: 44 mL, Rfl: 11    SUMAtriptan (IMITREX) 25 MG tablet, Take 25 mg by mouth at onset of headache for migraine May repeat in 2 hours., Disp: , Rfl:     valACYclovir (VALTREX) 1000 mg tablet, Take 2,000 mg by mouth 2 times daily as needed Take 2 tablets by mouth two times daily for one day. Use as needed at onset of cold sore., Disp: , Rfl:     ALLERGIES:  Allergies   Allergen Reactions    Amoxicillin-Pot Clavulanate Other (See Comments), Swelling and Rash     PN: facial swelling, left side. Also had cortisone injection the same day as she started the Augmentin.  Noted in downtime recovery of chart.    PN: facial swelling, left side. Also had cortisone injection the same day as she started the Augmentin.; HUT Comment: PN: facial swelling, left side. Also had cortisone injection the same day as she started the Augmentin.Noted in downtime recovery of chart.; HUT Reaction: Rash; HUT Reaction: Unknown; HUT Reaction Type: Allergy; HUT Severity: Med; HUT Noted: [id number]  PN: facial swelling, left side. Also had cortisone injection the same day as she started the Augmentin.  Other reaction(s): *Unknown  PN: facial swelling, left side. Also had cortisone injection the same day as she started the Augmentin.  Noted in downtime recovery of chart.  PN: facial swelling, left side. Also had cortisone injection the same day as she started the Augmentin.  Other reaction(s): Facial swelling  Other reaction(s): Facial swelling    Hydrocodone Nausea and Vomiting and GI Disturbance     vomiting for days, PN: vomiting for days; HUT Comment: vomiting for days; HUT Reaction: Gastrointestinal; HUT Reaction: Nausea And Vomiting; HUT Reaction Type: Intolerance; HUT Severity: Med; HUT Noted: [id number]  vomiting for days    Other reaction(s): Rash    Hydrocodone-Acetaminophen Nausea  and Vomiting and Rash     Update on 12/12  Pt says she can take tylenol just not the hydrocodone.   Other reaction(s): Rash      Influenza Vaccines Other (See Comments) and Nausea and Vomiting     HUT Reaction: Nausea And Vomiting; HUT Reaction Type: Intolerance; HUT Severity: Low; HUT Noted: [id number]    Latex Rash     HUT Reaction: Rash; HUT Reaction Type: Allergy; HUT Severity: Low; HUT Noted: [id number]  Other reaction(s): Rash      Oseltamivir Hives     med stopped, PN: med stopped  med stopped, PN: med stopped; HUT Comment: med stopped, PN: med stopped; HUT Reaction: Hives; HUT Reaction Type: Allergy; HUT Severity: Med; HUT Noted: [id number]    Penicillins Anaphylaxis     HUT Reaction: Anaphylaxis; HUT Reaction Type: Allergy; HUT Severity: High; HUT Noted: [id number]    Vancomycin Itching, Swelling and Rash     Other reaction(s): Redness  Flushed face, very itchy; HUT Comment: Flushed face, very itchy; HUT Reaction: Angioedema; HUT Reaction: Redness; HUT Severity: Med; HUT Noted: [id number]    facial    Blood-Group Specific Substance Other (See Comments)     Patient has an anti-Cw and non-specific antibodies. Blood product orders may be delayed. Draw one red top and two purple top tubes for all type/screen/crossmatch orders.  Patient has anti-Cw, anti-K ([place]), Warm auto and nonspecific antibodies. Blood products may be delayed. Draw patient 24 hours prior to transfusion. Draw one red top and two purple top tubes for all type and screen orders.    Clavulanic Acid Angioedema    Fentanyl Itching    Haemophilus B Polysaccharide Vaccine Nausea and Vomiting    Naltrexone Other (See Comments)     Reaction(s): Vivid dreams.    Other Drug Allergy (See Comments)      See original file MRN [id number]. Files are marked for merge    Oxycodone Swelling    Adhesive Tape Rash     Silicone type  Silicone type    Other reaction(s): adhesive allergy  Other reaction(s): adhesive allergy  Silicone type    Other reaction(s): adhesive  "allergy      Band-Aid Anti-Itch      Other reaction(s): adhesive allergy    Cephalosporins Rash    Lamotrigine Rash     Possibly associated with Lamictal.   HUT Comment: Possibly associated with Lamictal. ; HUT Reaction: Rash; HUT Reaction Type: Allergy; HUT Severity: Low; HUT Noted: [id number]    No Clinical Screening - See Comments Rash and Other (See Comments)     Silicone type  Silicone type  See original file MRN [id number]. Files are marked for merge  History of swallowing sharp metallic objects. She should not be prescribed lancets due to posed risk of swallowing.        FAMILY HISTORY:  Family History   Problem Relation Age of Onset    Diabetes Type 2  Maternal Grandmother     Diabetes Type 2  Paternal Grandmother     Breast Cancer Paternal Grandmother     Cerebrovascular Disease Father 53    Myocardial Infarction No family hx of     Coronary Artery Disease Early Onset No family hx of     Ovarian Cancer No family hx of     Colon Cancer No family hx of     Depression Mother     Anxiety Disorder Mother        SOCIAL HISTORY:   Social History     Socioeconomic History    Marital status: Single   Occupational History    Occupation: On disability   Tobacco Use    Smoking status: Never    Smokeless tobacco: Never   Substance and Sexual Activity    Alcohol use: No     Alcohol/week: 0.0 standard drinks of alcohol    Drug use: No    Sexual activity: Not Currently     Partners: Male     Birth control/protection: I.U.D.     Comment: IUD - Mirena - placed July, 2015   Social History Narrative    Single.    Living in independent living portion of [employer].    On disability.    No regular exercise.        VITALS:  Patient Vitals for the past 24 hrs:   BP Temp Temp src Pulse Resp SpO2 Height Weight   10/02/23 0036 128/71 -- -- 85 15 96 % -- --   10/01/23 2350 -- -- -- 78 -- 96 % -- --   10/01/23 2200 128/57 -- -- 88 -- 97 % -- --   10/01/23 1829 (!) 152/80 97.3  F (36.3  C) Temporal 85 16 100 % 1.575 m (5' 2\") " 132.5 kg (292 lb)       PHYSICAL EXAM    Constitutional: Well developed, Well nourished, NAD, anxious appearing and uncomfortable, but not ill or toxic  HENT: Normocephalic, Atraumatic, Bilateral external ears normal, Oropharynx normal, mucous membranes moist, Nose normal.   Neck: Normal range of motion, No tenderness, Supple, No stridor.  Eyes: PERRL, EOMI, Conjunctiva normal, No discharge.   Musculoskeletal: On Inspection of her left lower extremity, she does have obvious foot drop on the left (baseline per patient).  No significant pallor of the skin as compared to the right.  I do appreciate good distal pulses and normal cap refill, mild swelling and ecchymosis to lateral malleolus. Mild tenderness on palpation to this area. 2+ DP pulses. No edema. No cyanosis, No clubbing. Good range of motion in all major joints. No tenderness to palpation or major deformities noted. No tenderness of the CTLS spine.   Integument: Warm, Dry, No erythema, No rash. No petechiae.  Neurologic: Alert & oriented x 3, Normal motor function, Normal sensory function, No focal deficits noted. Normal gait.  Psychiatric: Affect normal, Judgment normal, Mood normal. Cooperative.    LAB:  All pertinent labs reviewed and interpreted.  Labs Ordered and Resulted from Time of ED Arrival to Time of ED Departure - No data to display    RADIOLOGY:  Reviewed all pertinent imaging. Please see official radiology report.  CT Ankle Left w/o Contrast   Final Result   IMPRESSION:   1.  No acute fracture.         CT Foot Left w/o Contrast   Final Result   IMPRESSION:   1.  No acute fracture.         XR Foot Left G/E 3 Views   Final Result   IMPRESSION: Age indeterminant dorsal navicular 8 mm bone fragment, possibly acute. Correlation with the patient's symptoms is recommended.      Ankle mortise appears intact on this nonweightbearing study. No evidence of a medial or lateral malleolus fracture. Lisfranc interval is not well assessed on these  nonweightbearing views.      XR Ankle Left G/E 3 Views   Final Result   IMPRESSION: Age indeterminant dorsal navicular 8 mm bone fragment, possibly acute. Correlation with the patient's symptoms is recommended.      Ankle mortise appears intact on this nonweightbearing study. No evidence of a medial or lateral malleolus fracture. Lisfranc interval is not well assessed on these nonweightbearing views.          EKG:    None     PROCEDURES:   None     Diagnosis:  1. Acute left ankle pain    2. Left ankle sprain      Uri MURRAY , am serving as a scribe to document services personally performed by Tea Harkins PA-C based on my observation and the provider's statements to me. I, Tea Harkins PA-C attest that Uri Ruiz is acting in a scribe capacity, has observed my performance of the services and has documented them in accordance with my direction.    Tea Harkins PA-C  Emergency Medicine  [facility]  10/1/2023       Tea Harkins PA-C  10/02/23 0053

## 2023-10-02 NOTE — ED PROVIDER NOTES
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 patient 24 hours prior to transfusion. Draw one red top and two purple top tubes for all type and screen orders.    Clavulanic Acid Angioedema    Fentanyl Itching    Haemophilus B Polysaccharide Vaccine Nausea and Vomiting    Naltrexone Other (See Comments)     Reaction(s): Vivid dreams.    Other Drug Allergy  (See Comments)      See original file MRN [id number]. Files are marked for merge    Oxycodone Swelling    Adhesive Tape Rash     Silicone type  Silicone type    Other reaction(s): adhesive allergy  Other reaction(s): adhesive allergy  Silicone type    Other reaction(s): adhesive allergy      Band-Aid Anti-Itch      Other reaction(s): adhesive allergy    Cephalosporins Rash    Lamotrigine Rash     Possibly associated with Lamictal.   HUT Comment: Possibly associated with Lamictal. ; HUT Reaction: Rash; HUT Reaction Type: Allergy; HUT Severity: Low; HUT Noted: [id number]    No Clinical Screening - See Comments Rash and Other (See Comments)     Silicone type  Silicone type  See original file MRN [id number]. Files are marked for merge  History of swallowing sharp metallic objects. She should not be prescribed lancets due to posed risk of swallowing.      Family History  Family History   Problem Relation Age of Onset    Diabetes Type 2  Maternal Grandmother     Diabetes Type 2  Paternal Grandmother     Breast Cancer Paternal Grandmother     Cerebrovascular Disease Father 53    Myocardial Infarction No family hx of     Coronary Artery Disease Early Onset No family hx of     Ovarian Cancer No family hx of     Colon Cancer No family hx of     Depression Mother     Anxiety Disorder Mother      Social History   Social History     Tobacco Use    Smoking status: Never    Smokeless tobacco: Never   Substance Use Topics    Alcohol use: No     Alcohol/week: 0.0 standard drinks of alcohol    Drug use: No         A medically appropriate review of systems was performed with pertinent positives and negatives noted in the HPI, and all other systems negative.    Physical Exam   BP: 119/63  Pulse: 95  Temp: 98.4  F (36.9  C)  Resp: 16  SpO2: 98 %  Physical Exam  Constitutional:       General: She is not in acute distress.     Appearance: Normal appearance. She is well-developed.   HENT:      Head: Normocephalic and atraumatic.   Eyes:       Conjunctiva/sclera: Conjunctivae normal.   Cardiovascular:      Rate and Rhythm: Normal rate.   Pulmonary:      Effort: Pulmonary effort is normal. No respiratory distress.   Abdominal:      General: Abdomen is flat.      Palpations: Abdomen is soft.      Tenderness: There is generalized abdominal tenderness. There is no right CVA tenderness or left CVA tenderness. Negative signs include Shepard's sign and McBurney's sign.   Musculoskeletal:      Cervical back: Normal range of motion and neck supple.   Skin:     General: Skin is warm and dry.      Findings: No rash.   Neurological:      Mental Status: She is alert and oriented to person, place, and time.           ED Course, Procedures, & Data       Procedures            Results for orders placed or performed during the hospital encounter of 10/02/23   Comprehensive metabolic panel     Status: Abnormal   Result Value Ref Range    Sodium 141 135 - 145 mmol/L    Potassium 4.4 3.4 - 5.3 mmol/L    Carbon Dioxide (CO2) 24 22 - 29 mmol/L    Anion Gap 13 7 - 15 mmol/L    Urea Nitrogen 10.6 6.0 - 20.0 mg/dL    Creatinine 0.63 0.51 - 0.95 mg/dL    GFR Estimate >90 >60 mL/min/1.73m2    Calcium 9.7 8.6 - 10.0 mg/dL    Chloride 104 98 - 107 mmol/L    Glucose 104 (H) 70 - 99 mg/dL    Alkaline Phosphatase 70 35 - 104 U/L    AST 26 0 - 45 U/L    ALT 24 0 - 50 U/L    Protein Total 7.3 6.4 - 8.3 g/dL    Albumin 4.5 3.5 - 5.2 g/dL    Bilirubin Total 0.3 <=1.2 mg/dL   Lipase     Status: Normal   Result Value Ref Range    Lipase 40 13 - 60 U/L   UA with Microscopic reflex to Culture     Status: Abnormal    Specimen: Urine, NOS   Result Value Ref Range    Color Urine Yellow Colorless, Straw, Light Yellow, Yellow    Appearance Urine Clear Clear    Glucose Urine Negative Negative mg/dL    Bilirubin Urine Negative Negative    Ketones Urine 20 (A) Negative mg/dL    Specific Gravity Urine 1.021 1.003 - 1.035    Blood Urine Moderate (A) Negative    pH Urine 5.5 5.0 - 7.0    Protein Albumin  Urine 10 (A) Negative mg/dL    Urobilinogen Urine Normal Normal, 2.0 mg/dL    Nitrite Urine Negative Negative    Leukocyte Esterase Urine Negative Negative    Bacteria Urine Few (A) None Seen /HPF    Mucus Urine Present (A) None Seen /LPF    RBC Urine 4 (H) <=2 /HPF    WBC Urine 3 <=5 /HPF    Squamous Epithelials Urine <1 <=1 /HPF    Narrative    Urine Culture not indicated   HCG qualitative urine     Status: Normal   Result Value Ref Range    hCG Urine Qualitative Negative Negative   CBC with platelets and differential     Status: None   Result Value Ref Range    WBC Count 6.8 4.0 - 11.0 10e3/uL    RBC Count 4.52 3.80 - 5.20 10e6/uL    Hemoglobin 13.6 11.7 - 15.7 g/dL    Hematocrit 40.3 35.0 - 47.0 %    MCV 89 78 - 100 fL    MCH 30.1 26.5 - 33.0 pg    MCHC 33.7 31.5 - 36.5 g/dL    RDW 13.2 10.0 - 15.0 %    Platelet Count 300 150 - 450 10e3/uL    % Neutrophils 76 %    % Lymphocytes 17 %    % Monocytes 6 %    Mids % (Monos, Eos, Basos)      % Eosinophils 1 %    % Basophils 0 %    % Immature Granulocytes 0 %    NRBCs per 100 WBC 0 <1 /100    Absolute Neutrophils 5.1 1.6 - 8.3 10e3/uL    Absolute Lymphocytes 1.1 0.8 - 5.3 10e3/uL    Absolute Monocytes 0.4 0.0 - 1.3 10e3/uL    Mids Abs (Monos, Eos, Basos)      Absolute Eosinophils 0.1 0.0 - 0.7 10e3/uL    Absolute Basophils 0.0 0.0 - 0.2 10e3/uL    Absolute Immature Granulocytes 0.0 <=0.4 10e3/uL    Absolute NRBCs 0.0 10e3/uL   CBC with platelets differential     Status: None    Narrative    The following orders were created for panel order CBC with platelets differential.  Procedure                               Abnormality         Status                     ---------                               -----------         ------                     CBC with platelets and d...[[id number]]                      Final result                 Please view results for these tests on the individual orders.   Results for orders placed or performed during the hospital encounter of  10/01/23   XR Foot Left G/E 3 Views     Status: None    Narrative    EXAM: XR ANKLE LEFT G/E 3 VIEWS, XR FOOT LEFT G/E 3 VIEWS  LOCATION: [facility]  DATE: 10/1/2023    INDICATION: Trauma  COMPARISON: 08/31/2023.      Impression    IMPRESSION: Age indeterminant dorsal navicular 8 mm bone fragment, possibly acute. Correlation with the patient's symptoms is recommended.    Ankle mortise appears intact on this nonweightbearing study. No evidence of a medial or lateral malleolus fracture. Lisfranc interval is not well assessed on these nonweightbearing views.   XR Ankle Left G/E 3 Views     Status: None    Narrative    EXAM: XR ANKLE LEFT G/E 3 VIEWS, XR FOOT LEFT G/E 3 VIEWS  LOCATION: [facility]  DATE: 10/1/2023    INDICATION: Trauma  COMPARISON: 08/31/2023.      Impression    IMPRESSION: Age indeterminant dorsal navicular 8 mm bone fragment, possibly acute. Correlation with the patient's symptoms is recommended.    Ankle mortise appears intact on this nonweightbearing study. No evidence of a medial or lateral malleolus fracture. Lisfranc interval is not well assessed on these nonweightbearing views.   CT Foot Left w/o Contrast     Status: None    Narrative    EXAM: CT ANKLE LEFT W/O CONTRAST, CT FOOT LEFT W/O CONTRAST  LOCATION: [facility]  DATE: 10/1/2023    INDICATION: left ankle pain, decreased sensation, injury  COMPARISON: Radiographs 10/01/2023  TECHNIQUE: Noncontrast. Axial, sagittal and coronal thin-section reconstruction. Dose reduction techniques were used.     FINDINGS:     BONES:  -No acute fractures. Well-corticated irregularity of the dorsal navicular, likely reflecting remote injury. Normal alignment.     SOFT TISSUES:  -Severe diffuse muscular atrophy and fatty replacement.      Impression    IMPRESSION:  1.  No acute fracture.     CT Ankle Left w/o Contrast     Status: None    Narrative    EXAM: CT ANKLE LEFT W/O CONTRAST,  CT FOOT LEFT W/O CONTRAST  LOCATION: [facility]  DATE: 10/1/2023    INDICATION: left ankle pain, decreased sensation, injury  COMPARISON: Radiographs 10/01/2023  TECHNIQUE: Noncontrast. Axial, sagittal and coronal thin-section reconstruction. Dose reduction techniques were used.     FINDINGS:     BONES:  -No acute fractures. Well-corticated irregularity of the dorsal navicular, likely reflecting remote injury. Normal alignment.     SOFT TISSUES:  -Severe diffuse muscular atrophy and fatty replacement.      Impression    IMPRESSION:  1.  No acute fracture.       Medications   alum & mag hydroxide-simethicone (MAALOX) suspension 15 mL (15 mLs Oral $Given 10/2/23 1324)   ondansetron (ZOFRAN ODT) ODT tab 4 mg (4 mg Oral $Given 10/2/23 1324)   acetaminophen (TYLENOL) tablet 1,000 mg (1,000 mg Oral $Given 10/2/23 1351)     Labs Ordered and Resulted from Time of ED Arrival to Time of ED Departure   COMPREHENSIVE METABOLIC PANEL - Abnormal       Result Value    Sodium 141      Potassium 4.4      Carbon Dioxide (CO2) 24      Anion Gap 13      Urea Nitrogen 10.6      Creatinine 0.63      GFR Estimate >90      Calcium 9.7      Chloride 104      Glucose 104 (*)     Alkaline Phosphatase 70      AST 26      ALT 24      Protein Total 7.3      Albumin 4.5      Bilirubin Total 0.3     ROUTINE UA WITH MICROSCOPIC REFLEX TO CULTURE - Abnormal    Color Urine Yellow      Appearance Urine Clear      Glucose Urine Negative      Bilirubin Urine Negative      Ketones Urine 20 (*)     Specific Gravity Urine 1.021      Blood Urine Moderate (*)     pH Urine 5.5      Protein Albumin Urine 10 (*)     Urobilinogen Urine Normal      Nitrite Urine Negative      Leukocyte Esterase Urine Negative      Bacteria Urine Few (*)     Mucus Urine Present (*)     RBC Urine 4 (*)     WBC Urine 3      Squamous Epithelials Urine <1     LIPASE - Normal    Lipase 40     HCG QUALITATIVE URINE - Normal    hCG Urine Qualitative Negative      CBC WITH PLATELETS AND DIFFERENTIAL    WBC Count 6.8      RBC Count 4.52      Hemoglobin 13.6      Hematocrit 40.3      MCV 89      MCH 30.1      MCHC 33.7      RDW 13.2      Platelet Count 300      % Neutrophils 76      % Lymphocytes 17      % Monocytes 6      Mids % (Monos, Eos, Basos)        % Eosinophils 1      % Basophils 0      % Immature Granulocytes 0      NRBCs per 100 WBC 0      Absolute Neutrophils 5.1      Absolute Lymphocytes 1.1      Absolute Monocytes 0.4      Mids Abs (Monos, Eos, Basos)        Absolute Eosinophils 0.1      Absolute Basophils 0.0      Absolute Immature Granulocytes 0.0      Absolute NRBCs 0.0     URINE CULTURE     No orders to display          Critical care was not performed.     Medical Decision Making  The patient's presentation was of moderate complexity (an undiagnosed new problem with uncertain diagnosis).    The patient's evaluation involved:  review of external note(s) from 2 sources (ED and other)  review of 2 test result(s) ordered prior to this encounter (CT and EGD)  strong consideration of a test (abd CT) that was ultimately deferred  ordering and/or review of 1 test(s) in this encounter (labs)    The patient's management necessitated moderate risk (limitations due to social determinants of health (mental health)).    Assessment & Plan    31yo F pmhx borderline personality disorder, anxiety, history of self-harm (often times by FB ingestion or FB per rectum), obesity, PE, DM, and s/p cholecystomy and appendectomy p/w subacute abd pain.  Predominantly right-sided pain, but at times will be in other areas of her abdomen.  She is acutely worse over the last week.  Associate with nausea, and dark vomit x2 since yesterday that she is concerned to be blood.  Chart review shows that patient has had multiple presentations for similar pain over the last month, with negative work-ups including 2 CTs.  Patient denies foreign body ingestion or insertion today.    In the ED HDS/AF,  NAD, generally well-appearing.  Mild generalized abdominal TTP, without Shepard or McBurney point tenderness, rebound or guarding.  CVA tenderness.  Low suspicion acute surgical abdomen given chronicity of ongoing pain and negative imaging recently; patient s/p cholecystectomy and appendectomy.  Will obtain basic labs to assess for leukocytosis, anemia (EGD March 2023 normal without varices or ulcers), electrolyte abnormality, renal dysfunction, hepatobiliary dysfunction, urinary tract infection and UPT  to rule out pregnancy.  Notably, however, patient has a care plan in the notes that she has chronic abdominal pain with multiple ED visits across different health systems.  Suspect likely negative work-up again today, in which case she will be discharged with PCP and GI follow-up.    I have reviewed the nursing notes. I have reviewed the findings, diagnosis, plan and need for follow up with the patient.      ED Course as of 10/02/23 1513   Mon Oct 02, 2023   1344 HCG Qual Urine: Negative   1344 WBC: 6.8   1345 Hemoglobin: 13.6  Baseline, making significant hematemesis/UGIB less likely.    1350 UA with Microscopic reflex to Culture(!)  Moderate blood, few bacteria. Will culture.    1419 Lipase: 40   1419 Comprehensive metabolic panel(!)  wnl   1429 Pt's work up today unremarkable. Pt denying FB ingestion or insertion to me today; I have low suspicion for this being etiology for pt's symptoms as they have been ongoing for a month. Given negative laboratory work up today, pt's HDS and afebrile, and well appearing on exam, I do not feel that repeat imaging (last CT abd/pelvis 3 days ago) is indicated today.    Guardian (Aaliyah) was contacted and updated on pt's stay in the ED today, and recommendation for PCP follow up.     Pt discharged with instructions for PCP follow up, and return precautions for worsening symptoms.          New Prescriptions    No medications on file       Final diagnoses:   Generalized abdominal  bari Moreno PA-C  [facility] EMERGENCY DEPARTMENT  10/2/2023     Michael Moreno PA-C  10/02/23 1433       Michael Moreno PA-C  10/02/23 1506       Michael Moreno PA-C  10/02/23 1514

## 2023-10-02 NOTE — DISCHARGE INSTRUCTIONS
Your tests today where normal. You should follow up with your primary doctor for further testing for your on going abdominal pain. Return to the ER for worsening symptoms.

## 2023-10-03 LAB — BACTERIA UR CULT: NORMAL

## 2023-10-06 ENCOUNTER — OFFICE VISIT (OUTPATIENT)
Dept: OTOLARYNGOLOGY | Facility: CLINIC | Age: 33
End: 2023-10-06
Payer: COMMERCIAL

## 2023-10-06 VITALS
HEIGHT: 62 IN | WEIGHT: 284 LBS | HEART RATE: 79 BPM | BODY MASS INDEX: 52.26 KG/M2 | DIASTOLIC BLOOD PRESSURE: 86 MMHG | OXYGEN SATURATION: 97 % | SYSTOLIC BLOOD PRESSURE: 133 MMHG

## 2023-10-06 DIAGNOSIS — J34.89 NASAL OBSTRUCTION: Primary | ICD-10-CM

## 2023-10-06 PROCEDURE — 31231 NASAL ENDOSCOPY DX: CPT | Performed by: STUDENT IN AN ORGANIZED HEALTH CARE EDUCATION/TRAINING PROGRAM

## 2023-10-06 PROCEDURE — 99213 OFFICE O/P EST LOW 20 MIN: CPT | Mod: 25 | Performed by: STUDENT IN AN ORGANIZED HEALTH CARE EDUCATION/TRAINING PROGRAM

## 2023-10-06 RX ORDER — ECHINACEA PURPUREA EXTRACT 125 MG
TABLET ORAL
Qty: 44 ML | Refills: 11 | Status: ON HOLD | OUTPATIENT
Start: 2023-10-06 | End: 2023-10-16

## 2023-10-06 RX ORDER — SODIUM CHLORIDE/ALOE VERA
1 SPRAY, NON-AEROSOL (ML) NASAL DAILY
Qty: 22 ML | Refills: 11 | Status: SHIPPED | OUTPATIENT
Start: 2023-10-06 | End: 2024-01-25

## 2023-10-06 ASSESSMENT — PAIN SCALES - GENERAL: PAINLEVEL: MODERATE PAIN (4)

## 2023-10-06 NOTE — PROGRESS NOTES
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 11    SUMAtriptan (IMITREX) 25 MG tablet Take 25 mg by mouth at onset of headache for migraine May repeat in 2 hours.      valACYclovir (VALTREX) 1000 mg tablet Take 2,000 mg by mouth 2 times daily as needed Take 2 tablets by mouth two times daily for one day. Use as  needed at onset of cold sore.      gabapentin 8 % in PLO cream Apply 1 click (0.25 g) topically every 8 hours as needed for neuropathic pain (right thigh) 30 g 4    Semaglutide 3 MG TABS Take 3 mg by mouth daily before breakfast Then 7mg daily for second month. Then 14 mg daily       Amoxicillin-pot clavulanate, Hydrocodone, Hydrocodone-acetaminophen, Influenza vaccines, Latex, Oseltamivir, Penicillins, Vancomycin, Blood-group specific substance, Clavulanic acid, Fentanyl, Haemophilus b polysaccharide vaccine, Naltrexone, Other drug allergy (see comments), Oxycodone, Adhesive tape, Band-aid anti-itch, Cephalosporins, Lamotrigine, and No clinical screening - see comments  Social History     Socioeconomic History    Marital status: Single     Spouse name: Not on file    Number of children: Not on file    Years of education: Not on file    Highest education level: Not on file   Occupational History    Occupation: On disability   Tobacco Use    Smoking status: Never    Smokeless tobacco: Never   Vaping Use    Vaping Use: Not on file   Substance and Sexual Activity    Alcohol use: No     Alcohol/week: 0.0 standard drinks of alcohol    Drug use: No    Sexual activity: Not Currently     Partners: Male     Birth control/protection: I.U.D.     Comment: IUD - Mirena - placed July, 2015   Other Topics Concern    Parent/sibling w/ CABG, MI or angioplasty before 65F 55M? Not Asked   Social History Narrative    Single.    Living in independent living portion of [employer].    On disability.    No regular exercise.      Social Determinants of Health     Financial Resource Strain: Not on file   Food Insecurity: Not on file   Transportation Needs: Not on file   Physical Activity: Not on file   Stress: Not on file   Social Connections: Not on file   Interpersonal Safety: Not on file   Housing Stability: Not on file     Family History   Problem Relation Age of Onset    Diabetes Type 2  Maternal Grandmother     Diabetes Type 2   Paternal Grandmother     Breast Cancer Paternal Grandmother     Cerebrovascular Disease Father 53    Myocardial Infarction No family hx of     Coronary Artery Disease Early Onset No family hx of     Ovarian Cancer No family hx of     Colon Cancer No family hx of     Depression Mother     Anxiety Disorder Mother        PE:  Alert and Oriented, Answering Questions Appropriately  Atraumatic, Normocephalic, Face Symmetric  Skin: Orozco 2  Facial Nerve Intact and facial movement symmetric  EOMI  Nasal Exam: No external Deformity, she continues to have good nasal support.  Anterior anoscopy reveals red irritated and crusted and bloody mucosa throughout.  See procedure note below.  Chin: Normal   Lips/Teeth/Toungue/Gums: Lips intact  Neck: Trachea midline  Chest: No wheezing, cyanosis, or stridor  Card: not diaphoretic  Neuro/Psych: CN's 2-12 intact, Moves all extremities, ambulation in intact, positive affect, no notable muscle weakness          PROCEDURE:Nasal endoscopy    Indication: Nasal Endoscopy is performed to provide a more thorough evaluation of the nasal airway than seen on standard nasal speculum exam.    Performed by: Dr. Joleen Apple MD    Findings are as follows:   The scope was passed through each side of the nose.  The previously seen perforation is persistent.  It is stable in size without significant enlargement.  The posterior aspect is at the level of the head of the inferior turbinate.  The edges are severely crusted and raw with evidence of recent bleeding.  It is tender to palpation.  It is roughly 1 cm x 1 cm.        IMPRESSION/PLAN: Nevin Alvarado is a 32 year old female with a known nasal septal perforation.  It is stable today on examination, but the mucosa throughout the nose is very unhealthy as she has not been using saline spray or nasal saline gel.  Reported that right now the most important thing she can do is keep the lining of the nose healthy.  We discussed restarting  the nasal saline spray.  I again reiterated the importance of the nasal saline spray to keep the lining healthy.  I again reiterated that nothing should go into the nose and there should be no digital manipulation as this can make the perforation larger.  She also should continue to use the nasal saline gel but she can just use this in the morning as it is interfering with her CPAP at night.  We went over again the directions on how to use this without sticking anything up into her nose. I would like to see her back in another 6 months to make sure the perforation is remaining stable and that the lining is healthy as it is very unhealthy today on examination.      Photodocumentation was obtained.

## 2023-10-06 NOTE — Clinical Note
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 Semaglutide-Weight Management (WEGOVY) 0.5 MG/0.5ML pen Inject 0.5 mg Subcutaneous every 7 days 2 mL 1    sodium chloride (OCEAN) 0.65 % nasal spray  Spray 2 sprays in each side of the nose every 3 hours while awake. 44 mL 11    SUMAtriptan (IMITREX) 25 MG tablet Take 25 mg by mouth at onset of headache for migraine May repeat in 2 hours.      valACYclovir (VALTREX) 1000 mg tablet Take 2,000 mg by mouth 2 times daily as needed Take 2 tablets by mouth two times daily for one day. Use as needed at onset of cold sore.      gabapentin 8 % in PLO cream Apply 1 click (0.25 g) topically every 8 hours as needed for neuropathic pain (right thigh) 30 g 4    Semaglutide 3 MG TABS Take 3 mg by mouth daily before breakfast Then 7mg daily for second month. Then 14 mg daily       Amoxicillin-pot clavulanate, Hydrocodone, Hydrocodone-acetaminophen, Influenza vaccines, Latex, Oseltamivir, Penicillins, Vancomycin, Blood-group specific substance, Clavulanic acid, Fentanyl, Haemophilus b polysaccharide vaccine, Naltrexone, Other drug allergy (see comments), Oxycodone, Adhesive tape, Band-aid anti-itch, Cephalosporins, Lamotrigine, and No clinical screening - see comments  Social History     Socioeconomic History    Marital status: Single     Spouse name: Not on file    Number of children: Not on file    Years of education: Not on file    Highest education level: Not on file   Occupational History    Occupation: On disability   Tobacco Use    Smoking status: Never    Smokeless tobacco: Never   Vaping Use    Vaping Use: Not on file   Substance and Sexual Activity    Alcohol use: No     Alcohol/week: 0.0 standard drinks of alcohol    Drug use: No    Sexual activity: Not Currently     Partners: Male     Birth control/protection: I.U.D.     Comment: IUD - Mirena - placed July, 2015   Other Topics Concern    Parent/sibling w/ CABG, MI or angioplasty before 65F 55M? Not Asked   Social History Narrative    Single.    Living in independent living portion of [employer] [facility].    On disability.    No regular exercise.      Social Determinants of Health     Financial Resource Strain: Not on  file   Food Insecurity: Not on file   Transportation Needs: Not on file   Physical Activity: Not on file   Stress: Not on file   Social Connections: Not on file   Interpersonal Safety: Not on file   Housing Stability: Not on file     Family History   Problem Relation Age of Onset    Diabetes Type 2  Maternal Grandmother     Diabetes Type 2  Paternal Grandmother     Breast Cancer Paternal Grandmother     Cerebrovascular Disease Father 53    Myocardial Infarction No family hx of     Coronary Artery Disease Early Onset No family hx of     Ovarian Cancer No family hx of     Colon Cancer No family hx of     Depression Mother     Anxiety Disorder Mother        PE:  Alert and Oriented, Answering Questions Appropriately  Atraumatic, Normocephalic, Face Symmetric  Skin: Orozco 2  Facial Nerve Intact and facial movement symmetric  EOMI  Nasal Exam: No external Deformity, she continues to have good nasal support.  Anterior anoscopy reveals red irritated and crusted and bloody mucosa throughout.  See procedure note below.  Chin: Normal   Lips/Teeth/Toungue/Gums: Lips intact  Neck: Trachea midline  Chest: No wheezing, cyanosis, or stridor  Card: not diaphoretic  Neuro/Psych: CN's 2-12 intact, Moves all extremities, ambulation in intact, positive affect, no notable muscle weakness          PROCEDURE:Nasal endoscopy    Indication: Nasal Endoscopy is performed to provide a more thorough evaluation of the nasal airway than seen on standard nasal speculum exam.    Performed by: Dr. Joleen Apple MD    Findings are as follows:   The scope was passed through each side of the nose.  The previously seen perforation is persistent.  It is stable in size without significant enlargement.  The posterior aspect is at the level of the head of the inferior turbinate.  The edges are severely crusted and raw with evidence of recent bleeding.  It is tender to palpation.  It is roughly 1 cm x 1 cm.        IMPRESSION/PLAN: Nevin ZULUAGA  Jenny is a 32 year old female with a known nasal septal perforation.  It is stable today on examination, but the mucosa throughout the nose is very unhealthy as she has not been using saline spray or nasal saline gel.  Reported that right now the most important thing she can do is keep the lining of the nose healthy.  We discussed restarting the nasal saline spray.  I again reiterated the importance of the nasal saline spray to keep the lining healthy.  I again reiterated that nothing should go into the nose and there should be no digital manipulation as this can make the perforation larger.  She also should continue to use the nasal saline gel but she can just use this in the morning as it is interfering with her CPAP at night.  We went over again the directions on how to use this without sticking anything up into her nose. I would like to see her back in another 6 months to make sure the perforation is remaining stable and that the lining is healthy as it is very unhealthy today on examination.      Photodocumentation was obtained.         Again, thank you for allowing me to participate in the care of your patient.      Sincerely,    Joleen Apple MD

## 2023-10-11 ENCOUNTER — VIRTUAL VISIT (OUTPATIENT)
Dept: GASTROENTEROLOGY | Facility: CLINIC | Age: 33
End: 2023-10-11
Payer: COMMERCIAL

## 2023-10-11 DIAGNOSIS — K21.9 GASTROESOPHAGEAL REFLUX DISEASE, UNSPECIFIED WHETHER ESOPHAGITIS PRESENT: ICD-10-CM

## 2023-10-11 DIAGNOSIS — T18.9XXS SWALLOWED FOREIGN BODY, SEQUELA: ICD-10-CM

## 2023-10-11 DIAGNOSIS — R13.19 ESOPHAGEAL DYSPHAGIA: ICD-10-CM

## 2023-10-11 PROCEDURE — 99215 OFFICE O/P EST HI 40 MIN: CPT | Mod: VID | Performed by: PHYSICIAN ASSISTANT

## 2023-10-11 ASSESSMENT — PAIN SCALES - GENERAL: PAINLEVEL: MODERATE PAIN (4)

## 2023-10-11 NOTE — LETTER
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   Midline incision      Chronic pelvic pain in female 05/06/2015     Priority: Medium    Endometriosis 05/06/2015     Priority: Medium     Overview:   Endometriosis, mild- Stage 1 (minimal) on laparoscopy, confirmed by final path. 5/1/15      Class 3 severe obesity with serious comorbidity and body mass index (BMI) of 50.0 to 59.9 in adult, unspecified obesity type (H) 04/22/2015     Priority: Medium    Severe episode of recurrent major depressive disorder, without psychotic features (H) 12/17/2014     Priority: Medium     Overview:   Follows with psych outside clinic - cymbalta 40 mg as of 4/7/2015, topamax.  numerous inpatient hosp [id number] -cutting and SI thought more function of borderline personality disorder.   Overview:   Added automatically from request for surgery [id number]      Depression      Priority: Medium    Borderline personality disorder (H) 11/26/2014     Priority: Medium    GERD (gastroesophageal reflux disease) 08/16/2012     Priority: Medium     Overview:   was on med until [facility] took me off it      Irregular menses 03/05/2012     Priority: Medium     Overview:   3/2005 Alesse  9/2010 Loestrin  Not sure how long she took either-thinks they caused her nausea  3/5/2012 start Nuvaring      Carpal tunnel syndrome 12/11/2011     Priority: Medium     Overview:   11/11-Noran-per EMG      Herpes labialis 09/29/2010     Priority: Medium    Knee MCL sprain 10/05/2009     Priority: Medium    Rash and nonspecific skin eruption  03/06/2009     Priority: Medium     Overview:   Started in November  [facility] told chicken pox-given acyclovir-had one vaccination-rash never went away  1/22/09-[facility]-Prednisone  ER visit-3/5/09-given Benadryl and Prednisone  3/09-herpes simplex w/impetigo-lip, ezcema hips-Dr. Daily      Scoliosis 01/22/2007     Priority: Medium    Enlarged lymph nodes 12/31/2005     Priority: Medium     Overview:   Epic         Assessment and Plan   Assessment/Plan:    Nevin Alvarado is a 32 year old female with morbid obesity, PTSD, esophageal perforation 2019, left sided vocal cord paralysis, cholecystectomy, diarrhea, frequent foreign body ingestion who is presenting as a follow up patient was was originally seen in consultation by Dr. Ulloa at the request of Dr. Simons with a chief complaint of dysphagia, acid reflux.    Previous Evaluation Includes:    11/4/2022 formal video swallow study with safe swallow without penetration or aspiration and esophagram without structural/filling defect or evidence of a hiatal hernia.  It was reported that the esophageal motility was limited during evaluation secondary to patient's impaired mobility.  However, the esophagus was noted to be patulous with some evidence of dysmotility.    Most recently Nevin was seen by Dr. Simons 3/31/2023 for continued concerns of dysphonia/voice hoarseness.  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    Extensive scope history located within procedures tab. Most recent endoscopy 3/11/2023 for ingestion of zip tie.     #GERD   #Dysphagia   #Repatiative Foreign Body Ingestions   #Dairy Intolerance  Overall Nevin reports that her symptoms have been stable and her desires to swallow foreign objects continue to improve with continued psychiatric evaluation/behavioral health counseling.  She currently adheres to GERD lifestyle  modifications and Omeprazole 40 mg daily without breakthrough symptoms. As for her symptoms of dysphagia these are likely driven by reflux, repeat trauma from continued swallowing of foreign objects and complicated history of esophgeal perforation 2019. Intrinsic motility disorder of the esophagus remains on the differential however as symptoms are stable will defer additional evaluation at this time.      - Upper endoscopy with empiric dilation, only to be performed in absence of a foreign body for possibly stricturing disease. Will need to be completed in the hospital setting with a 60 minute time slot.   - Continue Omeprazole 40mg once daily 30-60 minutes before breakfast.   - Continued lifestyle modifications of chewing well, taking small bites and avoiding trigger foods as well as continued work with psychiatrist/behavioral health teams        Follow up plan:   Return to clinic 3 months and as needed.    The risks and benefits of my recommendations, as well as other treatment options were discussed with the patient and any available family today. All questions were answered.     Follow up: As planned above. Today, I personally spent 30 minutes in direct face to face time with the patient, of which greater than 50% of the time was spent in patient education and counseling as described above. Approximately 21 minutes were spent on indirect care associated with the patient's consultation including but not limited to review of: patient medical records to date, clinic visits, hospital records, lab results, imaging studies, procedural documentation, and coordinating care with other providers. The findings from this review are summarized in the above note. All of the above accounted for a cumulative time of 51 minutes and was performed on the date of service.     The patient verbalized understanding of the plan and was appreciative for the time spent and information provided during the office visit.       Author:    Carli Potter PA-C  Division of Gastroenterology, Hepatology, and Nutrition  [facility]     Documentation assisted by voice recognition and documentation system.            Again, thank you for allowing me to participate in the care of your patient.      Sincerely,    Carli Potter PA-C

## 2023-10-11 NOTE — PATIENT INSTRUCTIONS
It was a pleasure taking care of you today.  I've included a brief summary of our discussion and care plan from today's visit below.  Please review this information with your primary care provider.  _______________________________________________________________________    My recommendations are summarized as follows:    - Upper endoscopy with empiric dilation, only to be performed in absence of a foreign body for possibly stricturing disease. Will need to be completed in the hospital setting with a 60 minute time slot.   - Continue Omeprazole 40mg once daily 30-60 minutes before breakfast.   - Continued lifestyle modifications of chewing well, taking small bites and avoiding trigger foods as well as continued work with psychiatrist/behavioral health teams    To schedule endoscopic procedures you may call: [phone]  To schedule radiology (imaging) tests you may call: [phone]  To schedule an ENT appointment you may call: [phone]    Please call my nurse Arianna ([phone]), Roberta ([phone]) with any questions or concerns.      Return to GI Clinic in 3 months to review your progress.    _______________________________________________________________________    Who do I call with any questions after my visit?  Please be in touch if there are any further questions that arise following today's visit.  There are multiple ways to contact your gastroenterology care team.      During business hours, you may reach a Gastroenterology nurse at [phone] and choose option 3.       To schedule or reschedule an appointment, please call [phone].     You can always send a secure message through [employer].  [employer] messages are answered by your nurse or doctor typically within 24 hours.  Please allow extra time on weekends and holidays.      For urgent/emergent questions after business hours, you may reach the on-call GI Fellow by contacting the [facility]  at [phone].     How will I  get the results of any tests ordered?    You will receive all of your results.  If you have signed up for [employer], any tests ordered at your visit will be available to you after your physician reviews them.  Typically this takes 1-2 weeks.  If there are urgent results that require a change in your care plan, your physician or nurse will call you to discuss the next steps.      What is [employer]?  [employer] is a secure way for you to access all of your healthcare records from the [facility].  It is a web based computer program, so you can sign on to it from any location.  It also allows you to send secure messages to your care team.  I recommend signing up for [employer] access if you have not already done so and are comfortable with using a computer.      How to I schedule a follow-up visit?  If you did not schedule a follow-up visit today, please call [phone] to schedule a follow-up office visit.      If you feel you received exceptional care and are interested in supporting the clinical and research goals of Carli Potter PA-C or the Division of Gastroenterology, Hepatology, and Nutrition please contact [email or web address] from the [facility] to discuss opportunities to donate.    Sincerely,    Carli Potter PA-C  Division of Gastroenterology, Hepatology, and Nutrition  [facility]

## 2023-10-11 NOTE — NURSING NOTE
Is the patient currently in the state of [place]? YES    Visit mode:VIDEO    If the visit is dropped, the patient can be reconnected by: VIDEO VISIT: Send to e-mail at: [email or web address]    Will anyone else be joining the visit? NO  (If patient encounters technical issues they should call [phone]    How would you like to obtain your AVS? MyChart    Are changes needed to the allergy or medication list? Yes PT has changed dosage of  rexulti to 1mg nightly.    PT requests gabapentin and semaglutide tabs to be removed from med list.    Reason for visit: RECHECK    Jovon ACOSTAF

## 2023-10-11 NOTE — PROGRESS NOTES
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 12/01/2015     Priority: Medium     Overview:   Care Coordinator: DENIS Moody   Care Coordinator   [phone]   Care coordination focus: MH support when needed  Living situation: lives with sister  Important notes: many hospitalizations, ER visits, etc.  Restricted    See care plan under Chart Review > Misc Reports > [facility] CARE PLAN REPORT       Non-healing surgical wound 05/30/2015     Priority: Medium     Overview:   Midline incision       Chronic pelvic pain in female 05/06/2015     Priority: Medium     Endometriosis 05/06/2015     Priority: Medium     Overview:   Endometriosis, mild- Stage 1 (minimal) on laparoscopy, confirmed by final path. 5/1/15       Class 3 severe obesity with serious comorbidity and body mass index (BMI) of 50.0 to 59.9 in adult, unspecified obesity type (H) 04/22/2015     Priority: Medium     Severe episode of recurrent major depressive disorder, without psychotic features (H) 12/17/2014     Priority: Medium     Overview:   Follows with psych outside clinic - cymbalta 40 mg as of 4/7/2015, topamax.  numerous inpatient hosp [id number] -cutting and SI thought more function of borderline personality disorder.   Overview:   Added automatically from request for surgery [id number]       Depression      Priority: Medium     Borderline personality disorder (H) 11/26/2014     Priority: Medium     GERD (gastroesophageal reflux disease) 08/16/2012     Priority: Medium     Overview:   was on med until [place] took me off it       Irregular menses 03/05/2012     Priority: Medium     Overview:   3/2005 Alesse  9/2010 Loestrin  Not sure how long she took either-thinks they caused her nausea  3/5/2012 start Nuvaring       Carpal tunnel  syndrome 12/11/2011     Priority: Medium     Overview:   11/11-Noran-per EMG       Herpes labialis 09/29/2010     Priority: Medium     Knee MCL sprain 10/05/2009     Priority: Medium     Rash and nonspecific skin eruption 03/06/2009     Priority: Medium     Overview:   Started in November  [facility] told chicken pox-given acyclovir-had one vaccination-rash never went away  1/22/09-[facility]-Prednisone  ER visit-3/5/09-given Benadryl and Prednisone  3/09-herpes simplex w/impetigo-lip, ezcema hips-Dr. Daily       Scoliosis 01/22/2007     Priority: Medium     Enlarged lymph nodes 12/31/2005     Priority: Medium     Overview:   Epic         Assessment and Plan   Assessment/Plan:    Nevin Alvarado is a 32 year old female with morbid obesity, PTSD, esophageal perforation 2019, left sided vocal cord paralysis, cholecystectomy, diarrhea, frequent foreign body ingestion who is presenting as a follow up patient was was originally seen in consultation by Dr. Ulloa at the request of Dr. Simons with a chief complaint of dysphagia, acid reflux.    Previous Evaluation Includes:    11/4/2022 formal video swallow study with safe swallow without penetration or aspiration and esophagram without structural/filling defect or evidence of a hiatal hernia.  It was reported that the esophageal motility was limited during evaluation secondary to patient's impaired mobility.  However, the esophagus was noted to be patulous with some evidence of dysmotility.    Most recently Nevin was seen by Dr. Simons 3/31/2023 for continued concerns of dysphonia/voice hoarseness.  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    Extensive scope history located within procedures tab. Most recent endoscopy 3/11/2023 for ingestion of zip tie.     #GERD   #Dysphagia   #Repatiative Foreign Body Ingestions   #Dairy Intolerance  Overall Nevin  reports that her symptoms have been stable and her desires to swallow foreign objects continue to improve with continued psychiatric evaluation/behavioral health counseling.  She currently adheres to GERD lifestyle modifications and Omeprazole 40 mg daily without breakthrough symptoms. As for her symptoms of dysphagia these are likely driven by reflux, repeat trauma from continued swallowing of foreign objects and complicated history of esophgeal perforation 2019. Intrinsic motility disorder of the esophagus remains on the differential however as symptoms are stable will defer additional evaluation at this time.      - Upper endoscopy with empiric dilation, only to be performed in absence of a foreign body for possibly stricturing disease. Will need to be completed in the hospital setting with a 60 minute time slot.   - Continue Omeprazole 40mg once daily 30-60 minutes before breakfast.   - Continued lifestyle modifications of chewing well, taking small bites and avoiding trigger foods as well as continued work with psychiatrist/behavioral health teams        Follow up plan:   Return to clinic 3 months and as needed.    The risks and benefits of my recommendations, as well as other treatment options were discussed with the patient and any available family today. All questions were answered.     o Follow up: As planned above. Today, I personally spent 30 minutes in direct face to face time with the patient, of which greater than 50% of the time was spent in patient education and counseling as described above. Approximately 21 minutes were spent on indirect care associated with the patient's consultation including but not limited to review of: patient medical records to date, clinic visits, hospital records, lab results, imaging studies, procedural documentation, and coordinating care with other providers. The findings from this review are summarized in the above note. All of the above accounted for a cumulative time  of 51 minutes and was performed on the date of service.     The patient verbalized understanding of the plan and was appreciative for the time spent and information provided during the office visit.       Author:   Carli Potter PA-C  Division of Gastroenterology, Hepatology, and Nutrition  [facility]     Documentation assisted by voice recognition and documentation system.

## 2023-10-12 ENCOUNTER — HOSPITAL ENCOUNTER (OUTPATIENT)
Facility: CLINIC | Age: 33
End: 2023-10-12
Attending: INTERNAL MEDICINE | Admitting: INTERNAL MEDICINE
Payer: COMMERCIAL

## 2023-10-12 ENCOUNTER — TELEPHONE (OUTPATIENT)
Dept: GASTROENTEROLOGY | Facility: CLINIC | Age: 33
End: 2023-10-12
Payer: COMMERCIAL

## 2023-10-12 NOTE — TELEPHONE ENCOUNTER
"Endoscopy Scheduling Screen    Have you had a positive Covid test in the last 14 days?  No    Are you active on MyChart?   Yes    What insurance is in the chart?  Other:  [employer]    Ordering/Referring Provider: TOM NAZARIO   (If ordering provider performs procedure, schedule with ordering provider unless otherwise instructed. )    BMI: Estimated body mass index is 51.94 kg/m  as calculated from the following:    Height as of 10/6/23: 1.575 m (5' 2\").    Weight as of 10/6/23: 128.8 kg (284 lb).     Sedation Ordered  MAC/deep sedation.   BMI<= 45 45 < BMI <= 48 48 < BMI < = 50  BMI > 50   No Restrictions No MG ASC  No E[facility]  [facility] with exceptions Hospital Only OR Only       Are you taking any prescription medications for pain 3 or more times per week?   No    Do you have a history of malignant hyperthermia or adverse reaction to anesthesia?  No    (Females) Are you currently pregnant?   No     Have you been diagnosed or told you have pulmonary hypertension?   No    Do you have an LVAD?  No    Have you been told you have moderate to severe sleep apnea?  Yes (RN Review required for scheduling unless scheduling in Hospital.)    Have you been told you have COPD, asthma, or any other lung disease?  Yes     What breathing problems do you have?  Asthma     Do you use home oxygen?  No    Have your breathing problems required an ED visit or hospitalization in the last year?  No    Do you have any heart conditions?  No     Have you ever had an organ transplant?   No    Have you ever had or are you awaiting a heart or lung transplant?   No    Have you had a stroke or transient ischemic attack (TIA aka \"mini stroke\" in the last 6 months?   No    Have you been diagnosed with or been told you have cirrhosis of the liver?   No    Are you currently on dialysis?   No    Do you need assistance transferring?   No patient has a walker    BMI: Estimated body mass index is 51.94 kg/m  as calculated from the following:    Height as " "of 10/6/23: 1.575 m (5' 2\").    Weight as of 10/6/23: 128.8 kg (284 lb).     Is patients BMI > 40 and scheduling location [facility]?  Yes (If MAC sedation is ordered, schedule PAC eval)    Do you take an injectable medication for weight loss or diabetes (excluding insulin)?  Yes, hold time can be up to 7 days. Please check with you prescribing provider for recommendation.     Do you take the medication Naltrexone?  No    Do you take blood thinners?  No       Prep   Are you currently on dialysis or do you have chronic kidney disease?  No    Do you have a diagnosis of diabetes?  Yes (Golytely Prep)    Do you have a diagnosis of cystic fibrosis (CF)?  No    On a regular basis do you go 3 -5 days between bowel movements?  No    BMI > 40?  Yes (Extended Prep)    Preferred Pharmacy:    [facility] [address]  [address] [address] [facility] [address]  Phone: [phone] Fax: [phone]      Final Scheduling Details   Colonoscopy prep sent?  N/A    Procedure scheduled  Upper endoscopy (EGD)    Surgeon:  DALTON     Date of procedure:  1/23/24     Pre-OP / PAC:   Yes - PAC clinic evaluation scheduled.    Location  [facility] - Per order.    Sedation   MAC/Deep Sedation - Per order.    PATIENT STATED ONLY WAY TO GET AN IV IN IS THROUGH ULTRASOUND      Patient Reminders:   You will receive a call from a Nurse to review instructions and health history.  This assessment must be completed prior to your procedure.  Failure to complete the Nurse assessment may result in the procedure being cancelled.      On the day of your procedure, please designate an adult(s) who can drive you home stay with you for the next 24 hours. The medicines used in the exam will make you sleepy. You will not be able to drive.      You cannot take public transportation, ride share services, or non-medical taxi service without a responsible caregiver.  Medical transport services are allowed with the requirement that a " responsible caregiver will receive you at your destination.  We require that drivers and caregivers are confirmed prior to your procedure.

## 2023-10-12 NOTE — PROGRESS NOTES
"Video-Visit Details    Type of service:  Video Visit    Video Start Time: 1:58 PM  Video End Time: 2:26 PM    Originating Location (pt. Location): Home    Distant Location (provider location): Offsite (providers home) [facility] [place]     Platform used for Video Visit: [facility]    Return Bariatric Nutrition Consultation Note    Reason For Visit: Nutrition Assessment    Nevin Alvarado is a 32 year old presenting today for return bariatric nutrition consult.  Provided the weight loss surgery nutrition class information during this visit.  Pt is interested in laparoscopic sleeve gastrectomy.  Patient is accompanied by self.  This is pt's 2nd nutrition visit prior to surgery.     Pt referred by Sharon Toro NP on September 12, 2023.  CO-MORBIDITIES OF OBESITY INCLUDE:        9/12/2023    12:48 PM   --   I have the following health issues associated with obesity Type II Diabetes    High Blood Pressure    Sleep Apnea    Polycystic Ovarian Syndrome    GERD (Reflux)    Fatty Liver    Asthma    Stress Incontinence     SUPPORT:      9/12/2023    12:48 PM   Support System Reviewed With Patient   Who do you have in your support network that can be available to help you for the first 2 weeks after surgery? I live in a group home with 24 hour staff, mom   Who can you count on for support throughout your weight loss surgery journey? a friend, and my therapist       ANTHROPOMETRICS:  Initial Consult Weight: 309 lb on 9/13/23  Estimated body mass index is 51.94 kg/m  as calculated from the following:    Height as of 10/6/23: 1.575 m (5' 2\").    Weight as of 10/6/23: 128.8 kg (284 lb).  Current Weight: 282 lb per pt report, -27 lb since initial RD visit. ??accuracy of initial consult weight.     Required weight loss goal pre-op: -20 lbs from initial consult weight (goal weight 289 lbs or less before surgery)        9/12/2023    12:48 PM   --   I have tried the following methods to lose weight " Watching portions or calories    Exercise    Pre packaged meals ex: Nutrisystem    OTC Medications    Prescription Medications           9/12/2023    12:48 PM   Weight Loss Questions Reviewed With Patient   How long have you been overweight? Since late teens through early 20's     MEDICATION FOR WEIGHT LOSS:  topiramate- took in 2014 for mood- caused anorexia   Naltrexone - suicidal thoughts   Wegovy - no side effects     Hx of self harm- swallowing thing- started after she was treated for anorexia when taking topiramate- in 6 months has only had one day of self harm- this was 2 weeks ago and instead of swallowing she inserted something per rectum. - Followed by MASON Potter PA-C, PE in 2019 after esophageal perforation repair     VITAMIN/MINERAL SUPPLEMENTS:  Iron     NUTRITION HISTORY:  Food allergies:NKFA  Food intolerances: None  Previous experience with diet modification for weight loss: Nutrisystem, prescription medications, exercise, watching calories or portions     Has been meal planning for the past 3 months. Likes chicken, turkey, shrimp.      October 2023: Eating 3 meals per day. Lunch and dinner meals have been chili or mediterranean orzo salad with artichokes more recently. Drinks mostly water, Cup of coffee, Bub[employer] Beverages. Doesn't drink soda or juice. Uses a walker for neuropathy. Walking around more often/standing longer which has been an improvement.         9/12/2023    12:48 PM   Recall Diet Questions Reviewed With Patient   Describe what you typically consume for breakfast (typical or most recent) eggs and hash browns, homemade waffles, laith pudding with fruit   Describe what you typically consume for lunch (typical or most recent) homemade lunchables, some pasta or chicken   Describe what you typically consume for supper (typical or most recent) low calorie meal prep meals   Describe what you typically consume as snacks (typical or most recent) cheese sticks, fruit jerky,  fruits/vegetables   How many ounces of water, or other low calorie drinks, do you drink daily (8 oz=1 glass)? 48 oz   How many ounces of caffeine (coffee, tea, pop) do you drink daily (8 oz=1 glass)? 16 oz   How many ounces of carbonated (pop, beer, sparkling water) drinks do you drinky daily (8 oz=1 glass)? 0 oz   How many ounces of juice, pop, sweet tea, sports drinks, protein drinks, other sweetened drinks, do you drink daily (8 oz=1 glass)? 0 oz   How many ounces of milk do you drink daily (8 oz=1 glass) 0 oz   How often do you drink alcohol? Never           9/12/2023    12:48 PM   Eating Habits   What foods do you crave? ice cream, chocolate, sometimes just salty chips           9/12/2023    12:48 PM   Dining Out History Reviewed With Patient   How often do you dine out? Rarely.   Where do you dine out? (select all that apply) take out   What types of food do you order when you dine out? jitendra verdin     EXERCISE:        9/12/2023    12:48 PM   --   How often do you exercise? Less than 1 time per week   What is the duration of your exercise (in minutes)? 10 Minutes   What types of exercise do you do? other   What keeps you from being more active? Pain    My ability to walk or move around is limited    Shortness of breath    Too tired     NUTRITION DIAGNOSIS:  Obesity r/t long history of positive energy balance aeb BMI >30 kg/m2.    INTERVENTION:  Intervention Provided/Education Provided on post-op diet guidelines, vitamins/minerals essential post-operatively, GI anatomy of bariatric surgeries, ways to help prepare for post-op diet guidelines pre-operatively, portion/calorie-control, mindful eating and sources of protein.  Patient demonstrates understanding.     Personal barriers to making and continuing required life changes have been identified, and strategies to overcome those barriers have been recommended AND family and social supports have been assessed and strategies to strengthen those supports have been  recommended.    Provided pt with list of goals, RD contact information and resources listed below via [employer].             9/12/2023    12:48 PM   Questions Reviewed With Patient   How ready are you to make changes regarding your weight? Number 1 = Not ready at all to make changes up to 10 = very ready. 10   How confident are you that you can change? 1 = Not confident that you will be successful making changes up to 10 = very confident. 7     Expected Engagement: good    GOALS:  Relating To Eating:  - Eat slowly (20-30 minutes per meal), chewing foods well (25 chews per bite/applesauce consistency)  - Focus on eating smaller portion sizes at meals and snacks  -Aim to consume 60 grams of protein each day (ex. 20 grams per meal)    Relating to beverages:  - Reduce caffeine/carbonation/calorie containing beverages  - Separate fluids from meals by 30 minutes before, during, and after eating  - Drink 48-64 ounces of fluid per day. Small, frequent sips between meals.    Relating to activity:  Increase activity as able    Healthier chip/cracker ideas.   [place] - [place] Flour cracker   RW Santy Sweet Potato crackers  Triscuits   Crunchmaster   Off the [place]aten Path - Chick pea veggie crisps  Popchips   [place] Snaps   Wheat Thins   Blue Zuly Nut thins   Whisps Parmesean Cheese Crisps    The Plate Method  [email or web address]    Protein Sources for Weight Loss  [email or web address]     Carbohydrates  [email or web address]     Mindful Eating  [email or web address]     Summary of Volumetrics Eating Plan  [email or web address]     Diet Guidelines after Weight Loss Surgery  [email or web address]     Seated Exercises for Arms and Legs (can be done before or after surgery)  [email or web address]    Follow Up: November 17.     Time spent with patient: 28 minutes.  Nevin Birmingham RD, LD

## 2023-10-13 ENCOUNTER — VIRTUAL VISIT (OUTPATIENT)
Dept: ENDOCRINOLOGY | Facility: CLINIC | Age: 33
End: 2023-10-13
Payer: COMMERCIAL

## 2023-10-13 ENCOUNTER — HOSPITAL ENCOUNTER (EMERGENCY)
Facility: CLINIC | Age: 33
Discharge: HOME | End: 2023-10-14
Admitting: EMERGENCY MEDICINE
Payer: COMMERCIAL

## 2023-10-13 ENCOUNTER — MYC MEDICAL ADVICE (OUTPATIENT)
Dept: ENDOCRINOLOGY | Facility: CLINIC | Age: 33
End: 2023-10-13

## 2023-10-13 VITALS — HEIGHT: 62 IN | WEIGHT: 282 LBS | BODY MASS INDEX: 51.89 KG/M2

## 2023-10-13 DIAGNOSIS — Z71.3 NUTRITIONAL COUNSELING: Primary | ICD-10-CM

## 2023-10-13 DIAGNOSIS — E66.813 CLASS 3 SEVERE OBESITY WITH SERIOUS COMORBIDITY AND BODY MASS INDEX (BMI) OF 50.0 TO 59.9 IN ADULT, UNSPECIFIED OBESITY TYPE (H): ICD-10-CM

## 2023-10-13 DIAGNOSIS — E66.01 CLASS 3 SEVERE OBESITY WITH SERIOUS COMORBIDITY AND BODY MASS INDEX (BMI) OF 50.0 TO 59.9 IN ADULT, UNSPECIFIED OBESITY TYPE (H): ICD-10-CM

## 2023-10-13 DIAGNOSIS — M79.672 LEFT FOOT PAIN: ICD-10-CM

## 2023-10-13 DIAGNOSIS — Z77.098 CHEMICAL EXPOSURE OF EYE: ICD-10-CM

## 2023-10-13 PROCEDURE — 97803 MED NUTRITION INDIV SUBSEQ: CPT | Mod: VID

## 2023-10-13 PROCEDURE — 99207 PR NO CHARGE LOS: CPT | Mod: VID

## 2023-10-13 PROCEDURE — 99283 EMERGENCY DEPT VISIT LOW MDM: CPT

## 2023-10-13 PROCEDURE — 250N000009 HC RX 250: Performed by: EMERGENCY MEDICINE

## 2023-10-13 RX ORDER — TETRACAINE HYDROCHLORIDE 5 MG/ML
1 SOLUTION OPHTHALMIC ONCE
Status: COMPLETED | OUTPATIENT
Start: 2023-10-13 | End: 2023-10-13

## 2023-10-13 RX ORDER — LACTOSE-REDUCED FOOD
240 POWDER (GRAM) ORAL DAILY
Qty: 7200 ML | Refills: 11 | Status: SHIPPED | OUTPATIENT
Start: 2023-10-13 | End: 2023-12-04

## 2023-10-13 RX ORDER — ERYTHROMYCIN 5 MG/G
OINTMENT OPHTHALMIC ONCE
Status: COMPLETED | OUTPATIENT
Start: 2023-10-13 | End: 2023-10-13

## 2023-10-13 RX ORDER — ERYTHROMYCIN 5 MG/G
0.5 OINTMENT OPHTHALMIC
Qty: 3.5 G | Refills: 0 | Status: SHIPPED | OUTPATIENT
Start: 2023-10-13 | End: 2023-10-20

## 2023-10-13 RX ADMIN — ERYTHROMYCIN 1 G: 5 OINTMENT OPHTHALMIC at 23:49

## 2023-10-13 RX ADMIN — FLUORESCEIN SODIUM 1 STRIP: 1 STRIP OPHTHALMIC at 23:49

## 2023-10-13 RX ADMIN — TETRACAINE HYDROCHLORIDE 1 DROP: 5 SOLUTION OPHTHALMIC at 23:49

## 2023-10-13 ASSESSMENT — ENCOUNTER SYMPTOMS
PHOTOPHOBIA: 1
NAUSEA: 0
EYE PAIN: 1
VOMITING: 0

## 2023-10-13 ASSESSMENT — PAIN SCALES - GENERAL: PAINLEVEL: NO PAIN (0)

## 2023-10-13 ASSESSMENT — ACTIVITIES OF DAILY LIVING (ADL): ADLS_ACUITY_SCORE: 40

## 2023-10-13 NOTE — LETTER
"10/13/2023       RE: Nevin Alvarado  [address] [facility] [address]     Dear Colleague,    Thank you for referring your patient, Nevin Alvarado, to the [facility] [place] at [facility]. Please see a copy of my visit note below.    Video-Visit Details    Type of service:  Video Visit    Video Start Time: 1:58 PM  Video End Time: 2:26 PM    Originating Location (pt. Location): Home    Distant Location (provider location): Offsite (providers home) [facility] [place]     Platform used for Video Visit: [employer]    Return Bariatric Nutrition Consultation Note    Reason For Visit: Nutrition Assessment    Nevin Alvarado is a 32 year old presenting today for return bariatric nutrition consult.  Provided the weight loss surgery nutrition class information during this visit.  Pt is interested in laparoscopic sleeve gastrectomy.  Patient is accompanied by self.  This is pt's 2nd nutrition visit prior to surgery.     Pt referred by Sharon Toro NP on September 12, 2023.  CO-MORBIDITIES OF OBESITY INCLUDE:        9/12/2023    12:48 PM   --   I have the following health issues associated with obesity Type II Diabetes    High Blood Pressure    Sleep Apnea    Polycystic Ovarian Syndrome    GERD (Reflux)    Fatty Liver    Asthma    Stress Incontinence     SUPPORT:      9/12/2023    12:48 PM   Support System Reviewed With Patient   Who do you have in your support network that can be available to help you for the first 2 weeks after surgery? I live in a group home with 24 hour staff, mom   Who can you count on for support throughout your weight loss surgery journey? a friend, and my therapist       ANTHROPOMETRICS:  Initial Consult Weight: 309 lb on 9/13/23  Estimated body mass index is 51.94 kg/m  as calculated from the following:    Height as of 10/6/23: 1.575 m (5' 2\").    Weight " as of 10/6/23: 128.8 kg (284 lb).  Current Weight: 282 lb per pt report, -27 lb since initial RD visit. ??accuracy of initial consult weight.     Required weight loss goal pre-op: -20 lbs from initial consult weight (goal weight 289 lbs or less before surgery)        9/12/2023    12:48 PM   --   I have tried the following methods to lose weight Watching portions or calories    Exercise    Pre packaged meals ex: Nutrisystem    OTC Medications    Prescription Medications           9/12/2023    12:48 PM   Weight Loss Questions Reviewed With Patient   How long have you been overweight? Since late teens through early 20's     MEDICATION FOR WEIGHT LOSS:  topiramate- took in 2014 for mood- caused anorexia   Naltrexone - suicidal thoughts   Wegovy - no side effects     Hx of self harm- swallowing thing- started after she was treated for anorexia when taking topiramate- in 6 months has only had one day of self harm- this was 2 weeks ago and instead of swallowing she inserted something per rectum. - Followed by MASON Potter PA-C, PE in 2019 after esophageal perforation repair     VITAMIN/MINERAL SUPPLEMENTS:  Iron     NUTRITION HISTORY:  Food allergies:NKFA  Food intolerances: None  Previous experience with diet modification for weight loss: Nutrisystem, prescription medications, exercise, watching calories or portions     Has been meal planning for the past 3 months. Likes chicken, turkey, shrimp.      October 2023: Eating 3 meals per day. Lunch and dinner meals have been chili or mediterranean orzo salad with artichokes more recently. Drinks mostly water, Cup of coffee, Bubbler Beverages. Doesn't drink soda or juice. Uses a walker for neuropathy. Walking around more often/standing longer which has been an improvement.         9/12/2023    12:48 PM   Recall Diet Questions Reviewed With Patient   Describe what you typically consume for breakfast (typical or most recent) eggs and hash browns, homemade waffles, laith  pudding with fruit   Describe what you typically consume for lunch (typical or most recent) homemade lunchables, some pasta or chicken   Describe what you typically consume for supper (typical or most recent) low calorie meal prep meals   Describe what you typically consume as snacks (typical or most recent) cheese sticks, fruit jerky, fruits/vegetables   How many ounces of water, or other low calorie drinks, do you drink daily (8 oz=1 glass)? 48 oz   How many ounces of caffeine (coffee, tea, pop) do you drink daily (8 oz=1 glass)? 16 oz   How many ounces of carbonated (pop, beer, sparkling water) drinks do you drinky daily (8 oz=1 glass)? 0 oz   How many ounces of juice, pop, sweet tea, sports drinks, protein drinks, other sweetened drinks, do you drink daily (8 oz=1 glass)? 0 oz   How many ounces of milk do you drink daily (8 oz=1 glass) 0 oz   How often do you drink alcohol? Never           9/12/2023    12:48 PM   Eating Habits   What foods do you crave? ice cream, chocolate, sometimes just salty chips           9/12/2023    12:48 PM   Dining Out History Reviewed With Patient   How often do you dine out? Rarely.   Where do you dine out? (select all that apply) take out   What types of food do you order when you dine out? jitendra verdin     EXERCISE:        9/12/2023    12:48 PM   --   How often do you exercise? Less than 1 time per week   What is the duration of your exercise (in minutes)? 10 Minutes   What types of exercise do you do? other   What keeps you from being more active? Pain    My ability to walk or move around is limited    Shortness of breath    Too tired     NUTRITION DIAGNOSIS:  Obesity r/t long history of positive energy balance aeb BMI >30 kg/m2.    INTERVENTION:  Intervention Provided/Education Provided on post-op diet guidelines, vitamins/minerals essential post-operatively, GI anatomy of bariatric surgeries, ways to help prepare for post-op diet guidelines pre-operatively,  portion/calorie-control, mindful eating and sources of protein.  Patient demonstrates understanding.     Personal barriers to making and continuing required life changes have been identified, and strategies to overcome those barriers have been recommended AND family and social supports have been assessed and strategies to strengthen those supports have been recommended.    Provided pt with list of goals, RD contact information and resources listed below via [employer].             9/12/2023    12:48 PM   Questions Reviewed With Patient   How ready are you to make changes regarding your weight? Number 1 = Not ready at all to make changes up to 10 = very ready. 10   How confident are you that you can change? 1 = Not confident that you will be successful making changes up to 10 = very confident. 7     Expected Engagement: good    GOALS:  Relating To Eating:  - Eat slowly (20-30 minutes per meal), chewing foods well (25 chews per bite/applesauce consistency)  - Focus on eating smaller portion sizes at meals and snacks  -Aim to consume 60 grams of protein each day (ex. 20 grams per meal)    Relating to beverages:  - Reduce caffeine/carbonation/calorie containing beverages  - Separate fluids from meals by 30 minutes before, during, and after eating  - Drink 48-64 ounces of fluid per day. Small, frequent sips between meals.    Relating to activity:  Increase activity as able    Healthier chip/cracker ideas.   [place] - [place] Flour cracker   RW Santy Sweet Potato crackers  Triscuits   Crunchmaster   Off the Beaten Path - Chick pea veggie crisps  Popchips   [place] Snaps   Wheat Thins   Blue Zuly Nut thins   Whisps Parmesean Cheese Crisps    The Plate Method  [email or web address]    Protein Sources for Weight Loss  [email or web address]     Carbohydrates  [email or web address]     Mindful Eating  [email or web address]     Summary of Volumetrics Eating  Plan  [email or web address]     Diet Guidelines after Weight Loss Surgery  [email or web address]     Seated Exercises for Arms and Legs (can be done before or after surgery)  [email or web address]    Follow Up: November 17.     Time spent with patient: 28 minutes.  Nevin Birmingham RD, LD

## 2023-10-13 NOTE — NURSING NOTE
Is the patient currently in the state of [place]? YES    Visit mode:VIDEO    If the visit is dropped, the patient can be reconnected by: VIDEO VISIT: Text to cell phone:   Telephone Information:   Mobile [phone]       Will anyone else be joining the visit? NO  (If patient encounters technical issues they should call [phone]    How would you like to obtain your AVS? MyChart    Are changes needed to the allergy or medication list? No    Reason for visit: RECHECK Shelby Kocher VVF

## 2023-10-13 NOTE — PATIENT INSTRUCTIONS
TERRI Pires sent over the prescription for Ensure and sent the team a message for a prescription for the next Wegovy increase.     Follow-up with RD on November 17.     Thank you,    Nevin Birmingham, SIDNEY, LD  If you would like to schedule or reschedule an appointment with the RD, please call [phone]    Nutrition Goals  Relating To Eating:  - Eat slowly (20-30 minutes per meal), chewing foods well (25 chews per bite/applesauce consistency)  - Focus on eating smaller portion sizes at meals and snacks  -Aim to consume 60 grams of protein each day (ex. 20 grams per meal)    Relating to beverages:  - Reduce caffeine/carbonation/calorie containing beverages  - Separate fluids from meals by 30 minutes before, during, and after eating  - Drink 48-64 ounces of fluid per day. Small, frequent sips between meals.    Relating to activity:  Increase activity as able    Healthier chip/cracker ideas.   [place] - [place] Flour cracker   RW Santy Sweet Potato crackers  Triscuits   Crunchmaster   Off the Beaten Path - Chick pea veggie crisps  Popchips   [place] Snaps   Wheat Thins   Blue Zuly Nut thins   Whisps Parmesean Cheese Crisps    The Plate Method  [email or web address]    Protein Sources for Weight Loss  [email or web address]     Carbohydrates  [email or web address]     Mindful Eating  [email or web address]     Summary of Volumetrics Eating Plan  [email or web address]     Diet Guidelines after Weight Loss Surgery  [email or web address]     Seated Exercises for Arms and Legs (can be done before or after surgery)  [email or web address]      COMPREHENSIVE WEIGHT MANAGEMENT PROGRAM  VIRTUAL SUPPORT GROUPS    For Support Group Information:      We offer support groups for patients who are working on weight loss and considering, preparing for, or have had weight loss surgery.     There is no cost for this opportunity.  You are invited to attend  the?Virtual Support Groups?provided by any of the following locations:    [facility] via iCook.tw Teams with Roxanna Alonso RN  2.   [place] via [email or web address] with Bird Franz, PhD, LP  3.   [place] via [email or web address] with Margie Stock RN  4.   [facility] via iCook.tw Teams with Margie Yan [facility]    The following Support Group information can also be found on our website:  [email or web address]      [facility] Weight Loss Surgery Support Group    [facility] Weight Loss Surgery Support Group  The support group is a patient-lead forum that meets monthly to share experiences, encouragement and education. It is open to those who have had weight loss surgery, are scheduled for surgery, or are considering surgery.   WHEN: This group meets on the 3rd Wednesday of each month from 5:00PM - 6:00PM virtually using Microsoft Teams.   FACILITATOR: Led by Roxanna Greenberg RD, LD, RN, the program's Clinical Coordinator.   TO REGISTER: Please contact the clinic via ITema or call the nurse line directly at [phone] to inform our staff that you would like an invite sent to you and to let us know the email you would like the invite sent to. Prior to the meeting, a link with directions on how to join the meeting will be sent to you.    2023 Meetings   September 20  October 18  November 15  December 20    [facility] and [facility] Support Groups    Connections Bariatric Care Support Group?  This is open to all pre- and post- operative bariatric surgery patients as well as their support system.   WHEN: Starting June 2023, this group meets the 3rd Tuesday of each month from 6:30 PM - 8:00 PM virtually using Microsoft Teams.   FACILITATOR: Led by Bird Franz, Ph.D who is a Licensed Psychologist with the [facility] Comprehensive Weight Management Program.   TO REGISTER:  "Please send an email to Bird Franz, Ph.D., LP at?[email or web address]?if you would like an invitation to the group and to learn about using Microsoft Teams.    2023 Meetings September 19 Silvestre Ramirez MD, FACS, [facility],   [facility], \"Body Contouring and the Bariatric Surgery Patient\".  October 17: Margie Stock RN, [facility],  Hospital Stay and Compliance   November 21: Alma Clement RD, LD, [facility],  Holiday Eating   December 19    Connections Post-Operative Bariatric Surgery Support Group  This is a support group for [facility] bariatric patients (and those external to [facility]) who have had bariatric surgery and are at least 3 months post-surgery.  WHEN: This support group meets the 4th Wednesday of the month from 11:00 AM - 12:00 PM virtually using Microsoft Teams.   FACILITATOR: Led by Certified Bariatric Nurse, Margie Stock RN.   TO REGISTER: Please send an email to Margie at [email or web address] if you would like an invitation to the group and to learn about using Microsoft Teams.    2023 Meetings September 27 October 25 November 22 December 27    [facility]   Healthy Lifestyle Virtual Support Group      Healthy Lifestyle Group  This is a 60 minute virtual coaching group for those who want to lead a healthier lifestyle. Come together to set goals and overcome barriers in a supportive group environment. We will address the four pillars of health--nutrition, exercise, sleep and emotional well-being.  This group is highly recommended for those who are participating in the 24 week Healthy Lifestyle Plan and our Health Coaching sessions.  WHEN: This group meets the first Friday of the month, 12:30 PM - 1:30 PM online, via a zoom meeting.    FACILITATOR: Led by National Board Certified Health and , Margie Yan [facility].  TO " "REGISTER: Please call the Call Center at [phone] to register. You will get an appointment to attend in [employer]. Fifteen minutes prior to the meeting, complete the e-check in and you will get the link to join the meeting.  There is no charge to attend this group and space is limited.    2023 and 2024 Meeting Topics and Dates:    November 3: Introduction to Mindfulness (Learn simple and effective mindfulness practices and how it can benefit you)  December 8: Let's Talk (guided discussion on our wins and challenges)  January 5: New Years Vision: Manifest your Best 2024! (Guided imagery,  journaling and discussion)  February 2: Let's Talk  March 1: 10 Percent Happier by Jovanni Koenig (Book Bites; a guided discussion on the nuggets of wisdom from favorite wellness books; no need to read the book but highly encouraged)  April 5: Let's Talk  May 3: \"Essentialism; The Disciplined Pursuit of Less by Varinder Acevedo (book bites discussion)  June 7: Let's Talk  July 5: NO MEETING, off for the 4th of July Holiday  August 2: The Blue Zones, Secrets for Living a Longer Life by Jovanni Jim (book bites discussion)          "

## 2023-10-14 VITALS
BODY MASS INDEX: 51.58 KG/M2 | TEMPERATURE: 98.3 F | RESPIRATION RATE: 14 BRPM | WEIGHT: 282 LBS | DIASTOLIC BLOOD PRESSURE: 55 MMHG | SYSTOLIC BLOOD PRESSURE: 107 MMHG | HEART RATE: 84 BPM | OXYGEN SATURATION: 97 %

## 2023-10-14 ASSESSMENT — ACTIVITIES OF DAILY LIVING (ADL): ADLS_ACUITY_SCORE: 40

## 2023-10-14 NOTE — ED PROVIDER NOTES
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 Sigmoidoscopy, Retrieval of Foreign Body;  Surgeon: Sasha Ivan MD;  Location: [facility] OR;  Service: General    RELEASE CARPAL TUNNEL Bilateral     RELEASE CARPAL TUNNEL Bilateral     REMOVAL, FOREIGN BODY, RECTUM N/A 7/21/2021    Procedure: MANUAL RETREIVALOF FOREIGN OBJECT- RECTUM.;  Surgeon: Aleksandra Gerber MD;   Location: [facility] OR    SIGMOIDOSCOPY FLEXIBLE N/A 1/10/2017    Procedure: SIGMOIDOSCOPY FLEXIBLE;  Surgeon: Annmarie Haynes MD;  Location: [facility] OR    SIGMOIDOSCOPY FLEXIBLE N/A 5/8/2018    Procedure: SIGMOIDOSCOPY FLEXIBLE;  flex sig with foreign body removal using snare and rattooth forcep;  Surgeon: Soham Cano MD;  Location:  GI    SIGMOIDOSCOPY FLEXIBLE N/A 2/20/2019    Procedure: Exam under anesthesia Colonoscopy with attempted  removal of rectal foreign body;  Surgeon: Estrada Chávez MD;  Location: [facility] OR       CURRENT MEDICATIONS:    erythromycin (ROMYCIN) 5 MG/GM ophthalmic ointment  albuterol (PROAIR HFA/PROVENTIL HFA/VENTOLIN HFA) 108 (90 Base) MCG/ACT inhaler  albuterol (PROVENTIL) (2.5 MG/3ML) 0.083% neb solution  Ayr Saline Nasal No-Drip GEL  BANOPHEN 2-0.1 % external cream  brexpiprazole (REXULTI) 2 MG tablet  cetirizine (ZYRTEC) 10 MG tablet  Cholecalciferol (D3 HIGH POTENCY) 25 MCG (1000 UT) CAPS  clonazePAM (KLONOPIN) 0.5 MG tablet  cyclobenzaprine (FLEXERIL) 10 MG tablet  desvenlafaxine (PRISTIQ) 100 MG 24 hr tablet  ferrous sulfate (FEROSUL) 325 (65 Fe) MG tablet  fluocinonide (LIDEX) 0.05 % external cream  furosemide (LASIX) 20 MG tablet  gabapentin 8 % in PLO cream  hydroxychloroquine (PLAQUENIL) 200 MG tablet  ibuprofen (ADVIL/MOTRIN) 600 MG tablet  ketorolac (TORADOL) 10 MG tablet  metFORMIN (GLUCOPHAGE XR) 500 MG 24 hr tablet  montelukast (SINGULAIR) 10 MG tablet  nabumetone (RELAFEN) 750 MG tablet  Nutritional Supplements (ENSURE MAX PROTEIN) LIQD  omeprazole (PRILOSEC) 40 MG DR capsule  ondansetron (ZOFRAN-ODT) 4 MG ODT tab  pregabalin (LYRICA) 100 MG capsule  Respiratory Therapy Supplies (NEBULIZER) BRENDAN  saline nasal (AYR SALINE) GEL topical gel  Semaglutide 3 MG TABS  Semaglutide-Weight Management (WEGOVY) 0.25 MG/0.5ML pen  Semaglutide-Weight Management (WEGOVY) 0.5 MG/0.5ML pen  sodium chloride (OCEAN) 0.65 % nasal spray  sodium chloride (OCEAN) 0.65 % nasal  spray  SUMAtriptan (IMITREX) 25 MG tablet  valACYclovir (VALTREX) 1000 mg tablet        ALLERGIES:  Allergies   Allergen Reactions    Amoxicillin-Pot Clavulanate Other (See Comments), Swelling and Rash     PN: facial swelling, left side. Also had cortisone injection the same day as she started the Augmentin.  Noted in downtime recovery of chart.    PN: facial swelling, left side. Also had cortisone injection the same day as she started the Augmentin.; HUT Comment: PN: facial swelling, left side. Also had cortisone injection the same day as she started the Augmentin.Noted in downtime recovery of chart.; HUT Reaction: Rash; HUT Reaction: Unknown; HUT Reaction Type: Allergy; HUT Severity: Med; HUT Noted: [id number]  PN: facial swelling, left side. Also had cortisone injection the same day as she started the Augmentin.  Other reaction(s): *Unknown  PN: facial swelling, left side. Also had cortisone injection the same day as she started the Augmentin.  Noted in downtime recovery of chart.  PN: facial swelling, left side. Also had cortisone injection the same day as she started the Augmentin.  Other reaction(s): Facial swelling  Other reaction(s): Facial swelling    Hydrocodone Nausea and Vomiting and GI Disturbance     vomiting for days, PN: vomiting for days; HUT Comment: vomiting for days; HUT Reaction: Gastrointestinal; HUT Reaction: Nausea And Vomiting; HUT Reaction Type: Intolerance; HUT Severity: Med; HUT Noted: [id number]  vomiting for days    Other reaction(s): Rash    Hydrocodone-Acetaminophen Nausea and Vomiting and Rash     Update on 12/12  Pt says she can take tylenol just not the hydrocodone.   Other reaction(s): Rash      Influenza Vaccines Other (See Comments) and Nausea and Vomiting     HUT Reaction: Nausea And Vomiting; HUT Reaction Type: Intolerance; HUT Severity: Low; HUT Noted: [id number]    Latex Rash     HUT Reaction: Rash; HUT Reaction Type: Allergy; HUT Severity: Low; HUT Noted: [id number]  Other  reaction(s): Rash      Oseltamivir Hives     med stopped, PN: med stopped  med stopped, PN: med stopped; HUT Comment: med stopped, PN: med stopped; HUT Reaction: Hives; HUT Reaction Type: Allergy; HUT Severity: Med; HUT Noted: [id number]    Penicillins Anaphylaxis     HUT Reaction: Anaphylaxis; HUT Reaction Type: Allergy; HUT Severity: High; HUT Noted: [id number]    Vancomycin Itching, Swelling and Rash     Other reaction(s): Redness  Flushed face, very itchy; HUT Comment: Flushed face, very itchy; HUT Reaction: Angioedema; HUT Reaction: Redness; HUT Severity: Med; HUT Noted: [id number]    facial    Blood-Group Specific Substance Other (See Comments)     Patient has an anti-Cw and non-specific antibodies. Blood product orders may be delayed. Draw one red top and two purple top tubes for all type/screen/crossmatch orders.  Patient has anti-Cw, anti-K (Angella), Warm auto and nonspecific antibodies. Blood products may be delayed. Draw patient 24 hours prior to transfusion. Draw one red top and two purple top tubes for all type and screen orders.    Clavulanic Acid Angioedema    Fentanyl Itching    Haemophilus B Polysaccharide Vaccine Nausea and Vomiting    Naltrexone Other (See Comments)     Reaction(s): Vivid dreams.    Other Drug Allergy (See Comments)      See original file MRN [id number]. Files are marked for merge    Oxycodone Swelling    Adhesive Tape Rash     Silicone type  Silicone type    Other reaction(s): adhesive allergy  Other reaction(s): adhesive allergy  Silicone type    Other reaction(s): adhesive allergy      Band-Aid Anti-Itch      Other reaction(s): adhesive allergy    Cephalosporins Rash    Lamotrigine Rash     Possibly associated with Lamictal.   HUT Comment: Possibly associated with Lamictal. ; HUT Reaction: Rash; HUT Reaction Type: Allergy; HUT Severity: Low; HUT Noted: [id number]    No Clinical Screening - See Comments Rash and Other (See Comments)     Silicone type  Silicone type  See original file MRN  [id number]. Files are marked for merge  History of swallowing sharp metallic objects. She should not be prescribed lancets due to posed risk of swallowing.        FAMILY HISTORY:  Family History   Problem Relation Age of Onset    Diabetes Type 2  Maternal Grandmother     Diabetes Type 2  Paternal Grandmother     Breast Cancer Paternal Grandmother     Cerebrovascular Disease Father 53    Myocardial Infarction No family hx of     Coronary Artery Disease Early Onset No family hx of     Ovarian Cancer No family hx of     Colon Cancer No family hx of     Depression Mother     Anxiety Disorder Mother        SOCIAL HISTORY:   Social History     Socioeconomic History    Marital status: Single   Occupational History    Occupation: On disability   Tobacco Use    Smoking status: Never    Smokeless tobacco: Never   Substance and Sexual Activity    Alcohol use: No     Alcohol/week: 0.0 standard drinks of alcohol    Drug use: No    Sexual activity: Not Currently     Partners: Male     Birth control/protection: I.U.D.     Comment: IUD - Mirena - placed July, 2015   Social History Narrative    Single.    Living in independent living portion of [employer].    On disability.    No regular exercise.        VITALS:  Patient Vitals for the past 24 hrs:   BP Temp Temp src Pulse Resp SpO2 Weight   10/13/23 2134 120/60 98.3  F (36.8  C) Oral 83 14 100 % 127.9 kg (282 lb)       PHYSICAL EXAM    VITAL SIGNS: /60   Pulse 83   Temp 98.3  F (36.8  C) (Oral)   Resp 14   Wt 127.9 kg (282 lb)   LMP 10/09/2023   SpO2 100%   BMI 51.58 kg/m    General Appearance: Alert, cooperative, normal speech and facial symmetry, appears stated age, the patient does not appear in distress  Head:  Normocephalic, without obvious abnormality, atraumatic  Eyes: Mild conjunctival injection diffusely in the right eye and appears uncomfortable. There is no significant discharge from the eye, swelling, erythema, or warmth surrounding the right eye.  Pupils are equal and reactive, EOMs intact bilaterally.   Extremities: Faint purple bruising on the dorsal aspect of the left distal foot; decreased subjective sensation on the entirety of the left foot compared to the right; capillary refill is less than 2 seconds, strong DP pulse, normal ROM of the toes  Neuro: Patient is awake, alert, and responsive to voice. No gross motor weaknesses or sensory loss; moves all extremities.    LAB:  All pertinent labs reviewed and interpreted.  Labs Ordered and Resulted from Time of ED Arrival to Time of ED Departure - No data to display    RADIOLOGY:  Reviewed all pertinent imaging. Please see official radiology report.  No orders to display       EKG:    None      ITrish, am serving as a scribe to document services personally performed by Lizz Dow PA-C based on my observation and the provider's statements to me. ILizz PA-C attest that Trish Gonzales is acting in a scribe capacity, has observed my performance of the services and has documented them in accordance with my direction.     Lizz Dow PA-C  Emergency Medicine  [facility] EMERGENCY ROOM  [address] [id number] [phone]  Dept: [phone]       Lizz Dow PA-C  10/14/23 0012

## 2023-10-14 NOTE — DISCHARGE INSTRUCTIONS
You were seen here today for evaluation of an eye problem. Your eye pH was normal and there was no evidence of glaucoma or a large scratch.     Use the erythromycin ointment every 4 hours during awake times for 7 days.  You may take Tylenol and ibuprofen for pain/fever, do not exceed 4000 mg of Tylenol per day or 3200 mg ofibuprofen per day.    Follow up with your eye doctor within 48 hours for recheck. Follow up with [facility] for your foot.     Return here for any new or worsening symptoms including severe pain, fever, loss of vision, excessive swelling/redness around the eye, or any other symptoms that concern you.

## 2023-10-14 NOTE — ED TRIAGE NOTES
Pt here via [facility]. At 1930 splashed acetone in right eye. Called poison control and flushed eye for 10 minutes, then a second time for 15 minutes.   Ems flushed eye in route to hospital.   States also has an ankle injury from 2 weeks ago and wants evaluated.

## 2023-10-15 ENCOUNTER — HOSPITAL ENCOUNTER (OUTPATIENT)
Facility: CLINIC | Age: 33
Setting detail: OBSERVATION
Discharge: NURSING, REHAB OR LONG-TERM CARE | End: 2023-10-16
Attending: EMERGENCY MEDICINE
Payer: COMMERCIAL

## 2023-10-15 DIAGNOSIS — T18.9XXA SWALLOWED FOREIGN BODY, INITIAL ENCOUNTER: ICD-10-CM

## 2023-10-15 PROCEDURE — 99285 EMERGENCY DEPT VISIT HI MDM: CPT | Mod: 25

## 2023-10-15 PROCEDURE — 96374 THER/PROPH/DIAG INJ IV PUSH: CPT

## 2023-10-15 PROCEDURE — 99238 HOSP IP/OBS DSCHRG MGMT 30/<: CPT | Performed by: EMERGENCY MEDICINE

## 2023-10-15 PROCEDURE — 250N000011 HC RX IP 250 OP 636: Performed by: EMERGENCY MEDICINE

## 2023-10-15 RX ORDER — MORPHINE SULFATE 2 MG/ML
2 INJECTION, SOLUTION INTRAMUSCULAR; INTRAVENOUS ONCE
Status: COMPLETED | OUTPATIENT
Start: 2023-10-15 | End: 2023-10-15

## 2023-10-15 RX ADMIN — MORPHINE SULFATE 2 MG: 2 INJECTION, SOLUTION INTRAMUSCULAR; INTRAVENOUS at 23:36

## 2023-10-15 ASSESSMENT — ACTIVITIES OF DAILY LIVING (ADL): ADLS_ACUITY_SCORE: 40

## 2023-10-16 ENCOUNTER — APPOINTMENT (OUTPATIENT)
Dept: RADIOLOGY | Facility: CLINIC | Age: 33
End: 2023-10-16
Attending: HOSPITALIST
Payer: COMMERCIAL

## 2023-10-16 ENCOUNTER — ANESTHESIA EVENT (OUTPATIENT)
Dept: SURGERY | Facility: CLINIC | Age: 33
End: 2023-10-16
Payer: COMMERCIAL

## 2023-10-16 ENCOUNTER — APPOINTMENT (OUTPATIENT)
Dept: RADIOLOGY | Facility: CLINIC | Age: 33
End: 2023-10-16
Attending: EMERGENCY MEDICINE
Payer: COMMERCIAL

## 2023-10-16 ENCOUNTER — ANESTHESIA (OUTPATIENT)
Dept: SURGERY | Facility: CLINIC | Age: 33
End: 2023-10-16
Payer: COMMERCIAL

## 2023-10-16 VITALS
WEIGHT: 282 LBS | SYSTOLIC BLOOD PRESSURE: 127 MMHG | TEMPERATURE: 97.9 F | DIASTOLIC BLOOD PRESSURE: 83 MMHG | RESPIRATION RATE: 16 BRPM | HEIGHT: 62 IN | BODY MASS INDEX: 51.89 KG/M2 | HEART RATE: 86 BPM | OXYGEN SATURATION: 96 %

## 2023-10-16 LAB
GLUCOSE BLDC GLUCOMTR-MCNC: 98 MG/DL (ref 70–99)
UPPER GI ENDOSCOPY: NORMAL

## 2023-10-16 PROCEDURE — 999N000157 HC STATISTIC RCP TIME EA 10 MIN

## 2023-10-16 PROCEDURE — 82962 GLUCOSE BLOOD TEST: CPT

## 2023-10-16 PROCEDURE — 74019 RADEX ABDOMEN 2 VIEWS: CPT

## 2023-10-16 PROCEDURE — 258N000003 HC RX IP 258 OP 636: Performed by: ANESTHESIOLOGY

## 2023-10-16 PROCEDURE — 272N000001 HC OR GENERAL SUPPLY STERILE: Performed by: INTERNAL MEDICINE

## 2023-10-16 PROCEDURE — 360N000075 HC SURGERY LEVEL 2, PER MIN: Performed by: INTERNAL MEDICINE

## 2023-10-16 PROCEDURE — 250N000009 HC RX 250: Performed by: ANESTHESIOLOGY

## 2023-10-16 PROCEDURE — 250N000013 HC RX MED GY IP 250 OP 250 PS 637: Performed by: HOSPITALIST

## 2023-10-16 PROCEDURE — 74018 RADEX ABDOMEN 1 VIEW: CPT

## 2023-10-16 PROCEDURE — 250N000011 HC RX IP 250 OP 636: Mod: JZ | Performed by: EMERGENCY MEDICINE

## 2023-10-16 PROCEDURE — 96376 TX/PRO/DX INJ SAME DRUG ADON: CPT

## 2023-10-16 PROCEDURE — 96375 TX/PRO/DX INJ NEW DRUG ADDON: CPT

## 2023-10-16 PROCEDURE — 999N000141 HC STATISTIC PRE-PROCEDURE NURSING ASSESSMENT: Performed by: INTERNAL MEDICINE

## 2023-10-16 PROCEDURE — 94660 CPAP INITIATION&MGMT: CPT

## 2023-10-16 PROCEDURE — G0378 HOSPITAL OBSERVATION PER HR: HCPCS

## 2023-10-16 PROCEDURE — 710N000010 HC RECOVERY PHASE 1, LEVEL 2, PER MIN: Performed by: INTERNAL MEDICINE

## 2023-10-16 PROCEDURE — 96375 TX/PRO/DX INJ NEW DRUG ADDON: CPT | Mod: XU

## 2023-10-16 PROCEDURE — 71046 X-RAY EXAM CHEST 2 VIEWS: CPT

## 2023-10-16 PROCEDURE — 99222 1ST HOSP IP/OBS MODERATE 55: CPT | Performed by: HOSPITALIST

## 2023-10-16 PROCEDURE — 370N000017 HC ANESTHESIA TECHNICAL FEE, PER MIN: Performed by: INTERNAL MEDICINE

## 2023-10-16 PROCEDURE — 250N000011 HC RX IP 250 OP 636: Mod: JZ | Performed by: HOSPITALIST

## 2023-10-16 PROCEDURE — 250N000011 HC RX IP 250 OP 636: Mod: JZ | Performed by: ANESTHESIOLOGY

## 2023-10-16 RX ORDER — TRAMADOL HYDROCHLORIDE 50 MG/1
50 TABLET ORAL EVERY 6 HOURS PRN
Status: DISCONTINUED | OUTPATIENT
Start: 2023-10-16 | End: 2023-10-16 | Stop reason: HOSPADM

## 2023-10-16 RX ORDER — PROPOFOL 10 MG/ML
INJECTION, EMULSION INTRAVENOUS PRN
Status: DISCONTINUED | OUTPATIENT
Start: 2023-10-16 | End: 2023-10-16

## 2023-10-16 RX ORDER — PREGABALIN 100 MG/1
100 CAPSULE ORAL EVERY MORNING
COMMUNITY
End: 2024-05-02

## 2023-10-16 RX ORDER — NALOXONE HYDROCHLORIDE 0.4 MG/ML
0.4 INJECTION, SOLUTION INTRAMUSCULAR; INTRAVENOUS; SUBCUTANEOUS
Status: DISCONTINUED | OUTPATIENT
Start: 2023-10-16 | End: 2023-10-16 | Stop reason: HOSPADM

## 2023-10-16 RX ORDER — MORPHINE SULFATE 2 MG/ML
2 INJECTION, SOLUTION INTRAMUSCULAR; INTRAVENOUS ONCE
Status: COMPLETED | OUTPATIENT
Start: 2023-10-16 | End: 2023-10-16

## 2023-10-16 RX ORDER — SEMAGLUTIDE 0.5 MG/.5ML
0.5 INJECTION, SOLUTION SUBCUTANEOUS
Qty: 2 ML | Refills: 1 | Status: SHIPPED | OUTPATIENT
Start: 2023-10-16 | End: 2023-12-04

## 2023-10-16 RX ORDER — DIPHENHYDRAMINE HYDROCHLORIDE 50 MG/ML
25 INJECTION INTRAMUSCULAR; INTRAVENOUS ONCE
Status: COMPLETED | OUTPATIENT
Start: 2023-10-16 | End: 2023-10-16

## 2023-10-16 RX ORDER — PROCHLORPERAZINE 25 MG
25 SUPPOSITORY, RECTAL RECTAL EVERY 12 HOURS PRN
Status: DISCONTINUED | OUTPATIENT
Start: 2023-10-16 | End: 2023-10-16 | Stop reason: HOSPADM

## 2023-10-16 RX ORDER — DIPHENHYDRAMINE HYDROCHLORIDE 50 MG/ML
50 INJECTION INTRAMUSCULAR; INTRAVENOUS ONCE
Status: COMPLETED | OUTPATIENT
Start: 2023-10-16 | End: 2023-10-16

## 2023-10-16 RX ORDER — PROCHLORPERAZINE MALEATE 10 MG
10 TABLET ORAL EVERY 6 HOURS PRN
Status: DISCONTINUED | OUTPATIENT
Start: 2023-10-16 | End: 2023-10-16 | Stop reason: HOSPADM

## 2023-10-16 RX ORDER — NICOTINE POLACRILEX 4 MG
15-30 LOZENGE BUCCAL
Status: DISCONTINUED | OUTPATIENT
Start: 2023-10-16 | End: 2023-10-16 | Stop reason: HOSPADM

## 2023-10-16 RX ORDER — PREGABALIN 100 MG/1
200 CAPSULE ORAL AT BEDTIME
COMMUNITY
End: 2024-05-02

## 2023-10-16 RX ORDER — ONDANSETRON 2 MG/ML
4 INJECTION INTRAMUSCULAR; INTRAVENOUS EVERY 6 HOURS PRN
Status: DISCONTINUED | OUTPATIENT
Start: 2023-10-16 | End: 2023-10-16 | Stop reason: HOSPADM

## 2023-10-16 RX ORDER — ACETAMINOPHEN 325 MG/1
975 TABLET ORAL EVERY 8 HOURS PRN
Status: DISCONTINUED | OUTPATIENT
Start: 2023-10-16 | End: 2023-10-16 | Stop reason: HOSPADM

## 2023-10-16 RX ORDER — PROPOFOL 10 MG/ML
INJECTION, EMULSION INTRAVENOUS CONTINUOUS PRN
Status: DISCONTINUED | OUTPATIENT
Start: 2023-10-16 | End: 2023-10-16

## 2023-10-16 RX ORDER — DIPHENHYDRAMINE HYDROCHLORIDE 50 MG/ML
12.5 INJECTION INTRAMUSCULAR; INTRAVENOUS ONCE
Status: COMPLETED | OUTPATIENT
Start: 2023-10-16 | End: 2023-10-16

## 2023-10-16 RX ORDER — DEXTROSE MONOHYDRATE 25 G/50ML
25-50 INJECTION, SOLUTION INTRAVENOUS
Status: DISCONTINUED | OUTPATIENT
Start: 2023-10-16 | End: 2023-10-16 | Stop reason: HOSPADM

## 2023-10-16 RX ORDER — NORETHINDRONE ACETATE 5 MG
5 TABLET ORAL DAILY
COMMUNITY

## 2023-10-16 RX ORDER — LIDOCAINE 40 MG/G
CREAM TOPICAL
Status: DISCONTINUED | OUTPATIENT
Start: 2023-10-16 | End: 2023-10-16 | Stop reason: HOSPADM

## 2023-10-16 RX ORDER — FENTANYL CITRATE 50 UG/ML
50 INJECTION, SOLUTION INTRAMUSCULAR; INTRAVENOUS EVERY 5 MIN PRN
Status: DISCONTINUED | OUTPATIENT
Start: 2023-10-16 | End: 2023-10-16 | Stop reason: HOSPADM

## 2023-10-16 RX ORDER — DIPHENHYDRAMINE HYDROCHLORIDE 50 MG/ML
25 INJECTION INTRAMUSCULAR; INTRAVENOUS ONCE
Status: DISCONTINUED | OUTPATIENT
Start: 2023-10-16 | End: 2023-10-16

## 2023-10-16 RX ORDER — SODIUM CHLORIDE, SODIUM LACTATE, POTASSIUM CHLORIDE, CALCIUM CHLORIDE 600; 310; 30; 20 MG/100ML; MG/100ML; MG/100ML; MG/100ML
INJECTION, SOLUTION INTRAVENOUS CONTINUOUS
Status: DISCONTINUED | OUTPATIENT
Start: 2023-10-16 | End: 2023-10-16 | Stop reason: HOSPADM

## 2023-10-16 RX ORDER — ONDANSETRON 4 MG/1
4 TABLET, ORALLY DISINTEGRATING ORAL EVERY 6 HOURS PRN
Status: DISCONTINUED | OUTPATIENT
Start: 2023-10-16 | End: 2023-10-16 | Stop reason: HOSPADM

## 2023-10-16 RX ORDER — NALOXONE HYDROCHLORIDE 0.4 MG/ML
0.2 INJECTION, SOLUTION INTRAMUSCULAR; INTRAVENOUS; SUBCUTANEOUS
Status: DISCONTINUED | OUTPATIENT
Start: 2023-10-16 | End: 2023-10-16 | Stop reason: HOSPADM

## 2023-10-16 RX ORDER — FENTANYL CITRATE 50 UG/ML
25 INJECTION, SOLUTION INTRAMUSCULAR; INTRAVENOUS EVERY 5 MIN PRN
Status: DISCONTINUED | OUTPATIENT
Start: 2023-10-16 | End: 2023-10-16 | Stop reason: HOSPADM

## 2023-10-16 RX ORDER — ONDANSETRON 4 MG/1
4 TABLET, ORALLY DISINTEGRATING ORAL EVERY 30 MIN PRN
Status: DISCONTINUED | OUTPATIENT
Start: 2023-10-16 | End: 2023-10-16 | Stop reason: HOSPADM

## 2023-10-16 RX ORDER — LIDOCAINE HYDROCHLORIDE 10 MG/ML
INJECTION, SOLUTION INFILTRATION; PERINEURAL PRN
Status: DISCONTINUED | OUTPATIENT
Start: 2023-10-16 | End: 2023-10-16

## 2023-10-16 RX ORDER — ONDANSETRON 2 MG/ML
INJECTION INTRAMUSCULAR; INTRAVENOUS PRN
Status: DISCONTINUED | OUTPATIENT
Start: 2023-10-16 | End: 2023-10-16

## 2023-10-16 RX ORDER — HYDROMORPHONE HCL IN WATER/PF 6 MG/30 ML
0.4 PATIENT CONTROLLED ANALGESIA SYRINGE INTRAVENOUS EVERY 5 MIN PRN
Status: DISCONTINUED | OUTPATIENT
Start: 2023-10-16 | End: 2023-10-16 | Stop reason: HOSPADM

## 2023-10-16 RX ORDER — HYDROXYCHLOROQUINE SULFATE 200 MG/1
200 TABLET, FILM COATED ORAL DAILY
COMMUNITY
End: 2024-03-28

## 2023-10-16 RX ORDER — ONDANSETRON 2 MG/ML
4 INJECTION INTRAMUSCULAR; INTRAVENOUS EVERY 30 MIN PRN
Status: DISCONTINUED | OUTPATIENT
Start: 2023-10-16 | End: 2023-10-16 | Stop reason: HOSPADM

## 2023-10-16 RX ORDER — HYDROMORPHONE HCL IN WATER/PF 6 MG/30 ML
0.2 PATIENT CONTROLLED ANALGESIA SYRINGE INTRAVENOUS EVERY 5 MIN PRN
Status: DISCONTINUED | OUTPATIENT
Start: 2023-10-16 | End: 2023-10-16 | Stop reason: HOSPADM

## 2023-10-16 RX ORDER — DEXAMETHASONE SODIUM PHOSPHATE 4 MG/ML
INJECTION, SOLUTION INTRA-ARTICULAR; INTRALESIONAL; INTRAMUSCULAR; INTRAVENOUS; SOFT TISSUE PRN
Status: DISCONTINUED | OUTPATIENT
Start: 2023-10-16 | End: 2023-10-16

## 2023-10-16 RX ADMIN — DEXAMETHASONE SODIUM PHOSPHATE 4 MG: 4 INJECTION, SOLUTION INTRA-ARTICULAR; INTRALESIONAL; INTRAMUSCULAR; INTRAVENOUS; SOFT TISSUE at 13:35

## 2023-10-16 RX ADMIN — DIPHENHYDRAMINE HYDROCHLORIDE 50 MG: 50 INJECTION, SOLUTION INTRAMUSCULAR; INTRAVENOUS at 09:44

## 2023-10-16 RX ADMIN — DIPHENHYDRAMINE HYDROCHLORIDE 25 MG: 50 INJECTION, SOLUTION INTRAMUSCULAR; INTRAVENOUS at 00:35

## 2023-10-16 RX ADMIN — ONDANSETRON 4 MG: 2 INJECTION INTRAMUSCULAR; INTRAVENOUS at 13:35

## 2023-10-16 RX ADMIN — ONDANSETRON 4 MG: 2 INJECTION INTRAMUSCULAR; INTRAVENOUS at 09:03

## 2023-10-16 RX ADMIN — FENTANYL CITRATE 25 MCG: 50 INJECTION, SOLUTION INTRAMUSCULAR; INTRAVENOUS at 14:09

## 2023-10-16 RX ADMIN — MIDAZOLAM 1 MG: 1 INJECTION INTRAMUSCULAR; INTRAVENOUS at 12:30

## 2023-10-16 RX ADMIN — Medication 100 MG: at 13:35

## 2023-10-16 RX ADMIN — LIDOCAINE HYDROCHLORIDE 10 ML: 10 INJECTION, SOLUTION INFILTRATION; PERINEURAL at 13:35

## 2023-10-16 RX ADMIN — PROPOFOL 250 MG: 10 INJECTION, EMULSION INTRAVENOUS at 13:35

## 2023-10-16 RX ADMIN — SODIUM CHLORIDE, POTASSIUM CHLORIDE, SODIUM LACTATE AND CALCIUM CHLORIDE: 600; 310; 30; 20 INJECTION, SOLUTION INTRAVENOUS at 13:30

## 2023-10-16 RX ADMIN — PROPOFOL 150 MCG/KG/MIN: 10 INJECTION, EMULSION INTRAVENOUS at 13:35

## 2023-10-16 RX ADMIN — DIPHENHYDRAMINE HYDROCHLORIDE 12.5 MG: 50 INJECTION, SOLUTION INTRAMUSCULAR; INTRAVENOUS at 14:25

## 2023-10-16 RX ADMIN — DIPHENHYDRAMINE HYDROCHLORIDE 25 MG: 50 INJECTION, SOLUTION INTRAMUSCULAR; INTRAVENOUS at 15:22

## 2023-10-16 RX ADMIN — DIPHENHYDRAMINE HYDROCHLORIDE 25 MG: 50 INJECTION, SOLUTION INTRAMUSCULAR; INTRAVENOUS at 00:15

## 2023-10-16 RX ADMIN — MORPHINE SULFATE 2 MG: 2 INJECTION, SOLUTION INTRAMUSCULAR; INTRAVENOUS at 08:55

## 2023-10-16 RX ADMIN — TRAMADOL HYDROCHLORIDE 50 MG: 50 TABLET, COATED ORAL at 07:04

## 2023-10-16 ASSESSMENT — ACTIVITIES OF DAILY LIVING (ADL)
ADLS_ACUITY_SCORE: 37
ADLS_ACUITY_SCORE: 40
ADLS_ACUITY_SCORE: 37

## 2023-10-16 NOTE — PROGRESS NOTES
"PRIMARY DIAGNOSIS: \"GENERIC\" NURSING  OUTPATIENT/OBSERVATION GOALS TO BE MET BEFORE DISCHARGE:  ADLs back to baseline: Yes    Activity and level of assistance: Up with standby assistance.    Pain status: Improved-controlled with oral pain medications.    Return to near baseline physical activity: Yes     Pt oriented x4. Endorsing some mild abdominal pain. CPAP on at night. Passing flatus. 1:1 in place for safety. Pt did express frustration with staff not letting her keep some of her belongings next to her per policy. Reassurance provided as well as paper policy. NPO with ice chips. Repeat imaging in the AM.   "

## 2023-10-16 NOTE — ANESTHESIA PROCEDURE NOTES
Airway       Patient location during procedure: OR       Procedure Start/Stop Times: 10/16/2023 1:40 PM  Staff -        Anesthesiologist:  Barney Perez MD       CRNA: Kati Menchaca APRN CRNA       Performed By: CRNAIndications and Patient Condition       Indications for airway management: isma-procedural         Mask difficulty assessment: 0 - not attempted    Final Airway Details       Final airway type: endotracheal airway       Successful airway: ETT - single  Endotracheal Airway Details        ETT size (mm): 7.0       Cuffed: yes       Cuff volume (mL): 7       Successful intubation technique: video laryngoscopy       VL Blade Size: Glidescope 3       Grade View of Cords: 1       Adjucts: stylet       Position: Right       Measured from: gums/teeth       Secured at (cm): 22       Bite Block used: endo bite block.    Post intubation assessment        Placement verified by: capnometry and equal breath sounds        Number of attempts at approach: 1       Number of other approaches attempted: 0       Secured with: silk tape       Ease of procedure: easy       Dentition: Intact       Dental guard used and removed. Dental Guard Type: Proguard Red.    Medication(s) Administered   Medication Administration Time: 10/16/2023 1:40 PM

## 2023-10-16 NOTE — H&P
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 antibodies. Blood product orders may be delayed. Draw one red top and two purple top tubes for all type/screen/crossmatch orders.  Patient has anti-Cw, anti-K ([place]), Warm auto and nonspecific antibodies. Blood products may be  delayed. Draw patient 24 hours prior to transfusion. Draw one red top and two purple top tubes for all type and screen orders.    Clavulanic Acid Angioedema    Fentanyl Itching    Haemophilus B Polysaccharide Vaccine Nausea and Vomiting    Naltrexone Other (See Comments)     Reaction(s): Vivid dreams.    Other Drug Allergy (See Comments)      See original file MRN [id number]. Files are marked for merge    Oxycodone Swelling    Adhesive Tape Rash     Silicone type  Silicone type    Other reaction(s): adhesive allergy  Other reaction(s): adhesive allergy  Silicone type    Other reaction(s): adhesive allergy      Band-Aid Anti-Itch      Other reaction(s): adhesive allergy    Cephalosporins Rash    Lamotrigine Rash     Possibly associated with Lamictal.   HUT Comment: Possibly associated with Lamictal. ; HUT Reaction: Rash; HUT Reaction Type: Allergy; HUT Severity: Low; HUT Noted: [id number]    No Clinical Screening - See Comments Rash and Other (See Comments)     Silicone type  Silicone type  See original file MRN [id number]. Files are marked for merge  History of swallowing sharp metallic objects. She should not be prescribed lancets due to posed risk of swallowing.      Prior to Admission Medications      Prior to Admission Medications   Prescriptions Last Dose Informant Patient Reported? Taking?   Ayr Saline Nasal No-Drip GEL   No No   Sig: Spray 1 Application in nostril daily Apply into each nare 2 times daily Place in front of each side of your nose and breathe in to distribute gel daily. - Each Nare   BANOPHEN 2-0.1 % external cream   Yes No   Sig: Apply 1 applicator topically 2 times daily as needed for itching   Cholecalciferol (D3 HIGH POTENCY) 25 MCG (1000 UT) CAPS   Yes No   Sig: Take 50 mcg by mouth daily   Nutritional Supplements (ENSURE MAX PROTEIN) LIQD   No No   Sig: Take 240 mLs by mouth daily   Respiratory Therapy Supplies (NEBULIZER) BRENDAN  Self No No   Sig: Nebulizer device.  Albuterol nebulization  every 2 hours as needed for shortness of breath or wheezing.   SUMAtriptan (IMITREX) 25 MG tablet  Self Yes No   Sig: Take 25 mg by mouth at onset of headache for migraine May repeat in 2 hours.   Semaglutide 3 MG TABS   Yes No   Sig: Take 3 mg by mouth daily before breakfast Then 7mg daily for second month. Then 14 mg daily   Semaglutide-Weight Management (WEGOVY) 0.25 MG/0.5ML pen   No No   Sig: Inject 0.25 mg Subcutaneous every 7 days   Semaglutide-Weight Management (WEGOVY) 0.5 MG/0.5ML pen   No No   Sig: Inject 0.5 mg Subcutaneous every 7 days   albuterol (PROAIR HFA/PROVENTIL HFA/VENTOLIN HFA) 108 (90 Base) MCG/ACT inhaler  Self No No   Sig: Inhale 2 puffs into the lungs every 6 hours as needed for shortness of breath / dyspnea or wheezing   albuterol (PROVENTIL) (2.5 MG/3ML) 0.083% neb solution   Yes No   Sig: Take 2.5 mg by nebulization every 6 hours as needed for shortness of breath or wheezing   brexpiprazole (REXULTI) 2 MG tablet   Yes No   Sig: Take 2 mg by mouth every evening   cetirizine (ZYRTEC) 10 MG tablet  Self No No   Sig: Take 1 tablet (10 mg) by mouth daily   Patient taking differently: Take 10 mg by mouth every evening   clonazePAM (KLONOPIN) 0.5 MG tablet   Yes No   Sig: Take 0.5mg daily PRN anxiety- 20 tablets per month, try to keep it to 3-4x per week   cyclobenzaprine (FLEXERIL) 10 MG tablet   No No   Sig: Take 0.5-1 tablets (5-10 mg) by mouth 3 times daily as needed for muscle spasms   desvenlafaxine (PRISTIQ) 100 MG 24 hr tablet  Self No No   Sig: Take 1 tablet (100 mg) by mouth daily   Patient taking differently: Take 50 mg by mouth daily   erythromycin (ROMYCIN) 5 MG/GM ophthalmic ointment   No No   Sig: Place 0.5 inches into the right eye 6 times daily for 7 days   ferrous sulfate (FEROSUL) 325 (65 Fe) MG tablet  Self No No   Sig: Take 1 tablet (325 mg) by mouth daily (with breakfast)   fluocinonide (LIDEX) 0.05 % external cream   Yes No   Sig: Apply 1 Application topically 2 times  daily as needed Apply thinly to knee 2 times daily, Monday through Fridays, off on weekends as needed. Avoid face and skin folds.   furosemide (LASIX) 20 MG tablet   Yes No   Sig: Take 20 mg by mouth daily   gabapentin 8 % in PLO cream   No No   Sig: Apply 1 click (0.25 g) topically every 8 hours as needed for neuropathic pain (right thigh)   hydroxychloroquine (PLAQUENIL) 200 MG tablet  Self No No   Sig: Take 1 tablet (200 mg) by mouth 2 times daily   ibuprofen (ADVIL/MOTRIN) 600 MG tablet   No No   Sig: Take 1 tablet (600 mg) by mouth every 8 hours as needed for moderate pain   Patient taking differently: Take 600 mg by mouth every 8 hours as needed for moderate pain prn   ketorolac (TORADOL) 10 MG tablet   No No   Sig: Take 1 tablet (10 mg) by mouth every 6 hours as needed for moderate pain   metFORMIN (GLUCOPHAGE XR) 500 MG 24 hr tablet   Yes No   Sig: Take 1,000 mg by mouth daily (with breakfast)   montelukast (SINGULAIR) 10 MG tablet  Self Yes No   Sig: Take 10 mg by mouth every evening   nabumetone (RELAFEN) 750 MG tablet   Yes No   omeprazole (PRILOSEC) 40 MG DR capsule   No No   Sig: Take 1 capsule (40 mg) by mouth daily   ondansetron (ZOFRAN-ODT) 4 MG ODT tab  Self No No   Sig: Take 1 tablet (4 mg) by mouth every 8 hours as needed for nausea   pregabalin (LYRICA) 100 MG capsule  Self No No   Sig: Take 1 capsule (100 mg) by mouth 3 times daily   saline nasal (AYR SALINE) GEL topical gel   No No   Sig: Apply into each nare 2 times daily Place in front of each side of your nose and breathe in to distribute gel twice daily.   sodium chloride (OCEAN) 0.65 % nasal spray   No No   Sig: Spray 2 sprays in each side of the nose every 3 hours while awake.   sodium chloride (OCEAN) 0.65 % nasal spray   No No   Sig: Spray 2 sprays in each side of the nose every 3 hours while awake.   valACYclovir (VALTREX) 1000 mg tablet  Self Yes No   Sig: Take 2,000 mg by mouth 2 times daily as needed Take 2 tablets by mouth two  times daily for one day. Use as needed at onset of cold sore.      Facility-Administered Medications: None      Review of Systems     A 12 point comprehensive review of systems was negative except as noted above in HPI.    PHYSICAL EXAMINATION     Vitals      Temp:  [98.4  F (36.9  C)] 98.4  F (36.9  C)  Pulse:  [92] 92  Resp:  [18] 18  BP: (141)/(82) 141/82  SpO2:  [97 %] 97 %    Examination   Physical Exam:    Gen: no acute distress, obese, sitting in bed, alert, interactive  Derm: Not pale, no jaundice  Psych: appropriate affect      Pertinent Radiology     Radiology Results:   Recent Results (from the past 24 hour(s))   XR Abdomen 2 Views    Narrative    EXAM: XR ABDOMEN 2 VIEWS  LOCATION: [facility]  DATE: 10/16/2023    INDICATION: swallowed paper clip  COMPARISON: CT on 09/30/2023      Impression    IMPRESSION: A 6 cm linear density in the midabdomen is compatible with provided history of swallowed paper clip, likely located within the stomach. Bowel gas pattern is normal. No intraperitoneal free air. Mild reverse S-shaped scoliosis of the   thoracolumbar spine redemonstrated.   XR Chest 2 Views    Narrative    EXAM: XR CHEST 2 VIEWS  LOCATION: [facility]  DATE/TIME: 10/16/2023 12:10 AM CDT    INDICATION: Swallowed paper clip.  COMPARISON: Chest x-ray on 09/23/2023.      Impression    IMPRESSION: PA and lateral views of the chest were obtained. Cardiomediastinal silhouette is within normal limits. Asymmetric elevation of the right hemidiaphragm as compared to the left. No suspicious focal pulmonary opacities. No significant pleural   effusion or pneumothorax. Incompletely characterized metallic object in the upper abdomen, seen on the lateral view, better characterized on same-day abdominal x-ray.         Brice Cabrales DO  [facility]  [facility]   Phone: #[phone]

## 2023-10-16 NOTE — CONSULTS
GI CONSULT NOTE      Name: Nevin Alvarado  Medical Record #: [id number]  YOB: 1990  Date of Admission: 10/15/2023  Date/Time: 10/16/2023/10:24 AM     CHIEF COMPLAINT: Swallow foreign body    HISTORY OF PRESENT ILLNESS: We were asked to see Nevin Alvarado by  Dr. Cabrales for foreign body ingestion.     Nevin Alvarado is a 32 year old year old female with history of ADD, depression, self-harm, suicide attempt, pulmonary embolism, morbid obesity, recurrent foreign body ingestion who presented with abdominal pain after swallowing an open paper clip. Imaging today shows this in the stomach. She reports 8/10 LUQ abdominal pain. No nausea or vomiting. She is passing gas, no recent bowel movements. She has been NPO.      REVIEW OF SYSTEMS (ROS): Complete review of systems negative other than listed in HPI.    PAST MEDICAL HISTORY:  Past Medical History:   Diagnosis Date    ADD (attention deficit disorder)     Anorexia nervosa with bulimia (H28)     history of; on Topamax    Anxiety     Asthma     Borderline personality disorder (H)     Depression     Eating disorder     H/O self-harm     h/o Suicide attempt 02/21/2018    History of pulmonary embolism 12/2019    Provoked. Completed 3 month course of Apixaban    Morbid obesity     Neuropathy     Obesity     PTSD (post-traumatic stress disorder)     Pulmonary emboli (H)     Rectal foreign body - Recurrent issue, self placed     Self-injurious behavior     hx swallowing nonfood items such as mickie pins    Sleep apnea     uses cpap    Suicidal overdose (H)     Swallowed foreign body - Recurrent issue, self placed     Syncope         FAMILY HISTORY:  Family History   Problem Relation Age of Onset    Diabetes Type 2  Maternal Grandmother     Diabetes Type 2  Paternal Grandmother     Breast Cancer Paternal Grandmother     Cerebrovascular Disease Father 53    Myocardial Infarction No family hx of     Coronary Artery Disease Early Onset No family hx  of     Ovarian Cancer No family hx of     Colon Cancer No family hx of     Depression Mother     Anxiety Disorder Mother        SOCIAL HISTORY:  Social History     Socioeconomic History    Marital status: Single     Spouse name: Not on file    Number of children: Not on file    Years of education: Not on file    Highest education level: Not on file   Occupational History    Occupation: On disability   Tobacco Use    Smoking status: Never    Smokeless tobacco: Never   Vaping Use    Vaping Use: Not on file   Substance and Sexual Activity    Alcohol use: No     Alcohol/week: 0.0 standard drinks of alcohol    Drug use: No    Sexual activity: Not Currently     Partners: Male     Birth control/protection: I.U.D.     Comment: IUD - Mirena - placed July, 2015   Other Topics Concern    Parent/sibling w/ CABG, MI or angioplasty before 65F 55M? Not Asked   Social History Narrative    Single.    Living in independent living portion of [employer].    On disability.    No regular exercise.      Social Determinants of Health     Financial Resource Strain: Not on file   Food Insecurity: Not on file   Transportation Needs: Not on file   Physical Activity: Not on file   Stress: Not on file   Social Connections: Not on file   Interpersonal Safety: Not on file   Housing Stability: Not on file       MEDICATIONS PRIOR TO ADMISSION:   Medications Prior to Admission   Medication Sig Dispense Refill Last Dose    albuterol (PROAIR HFA/PROVENTIL HFA/VENTOLIN HFA) 108 (90 Base) MCG/ACT inhaler Inhale 2 puffs into the lungs every 6 hours as needed for shortness of breath / dyspnea or wheezing 18 g 0 Past Week    albuterol (PROVENTIL) (2.5 MG/3ML) 0.083% neb solution Take 2.5 mg by nebulization every 6 hours as needed for shortness of breath or wheezing   Past Month    Ayr Saline Nasal No-Drip GEL Spray 1 Application in nostril daily Apply into each nare 2 times daily Place in front of each side of your nose and breathe in to distribute  gel daily. - Each Nare 22 mL 11 10/15/2023 at pm    BANOPHEN 2-0.1 % external cream Apply 1 applicator topically 2 times daily as needed for itching   Past Month    brexpiprazole (REXULTI) 1 MG tablet Take 1 mg by mouth at bedtime   10/15/2023 at pm    cetirizine (ZYRTEC) 10 MG tablet Take 1 tablet (10 mg) by mouth daily 30 tablet 0 10/15/2023 at PM takes at hs    Cholecalciferol (D3 HIGH POTENCY) 25 MCG (1000 UT) CAPS Take 50 mcg by mouth daily   10/15/2023 at am    clonazePAM (KLONOPIN) 0.5 MG tablet Take 0.5mg daily PRN anxiety- 20 tablets per month, try to keep it to 3-4x per week   Past Week    erythromycin (ROMYCIN) 5 MG/GM ophthalmic ointment Place 0.5 inches into the right eye 6 times daily for 7 days 3.5 g 0 Past Week    ferrous sulfate (FEROSUL) 325 (65 Fe) MG tablet Take 1 tablet (325 mg) by mouth daily (with breakfast) 30 tablet 0 10/15/2023 at am    furosemide (LASIX) 20 MG tablet Take 20 mg by mouth daily   10/15/2023 at am    hydroxychloroquine (PLAQUENIL) 200 MG tablet Take 200 mg by mouth daily   10/15/2023 at am    metFORMIN (GLUCOPHAGE XR) 500 MG 24 hr tablet Take 1,000 mg by mouth daily (with breakfast)   10/15/2023 at am    montelukast (SINGULAIR) 10 MG tablet Take 10 mg by mouth every evening   10/15/2023 at pm    nabumetone (RELAFEN) 750 MG tablet Take 750 mg by mouth 2 times daily   10/15/2023 at pm    norethindrone (AYGESTIN) 5 MG tablet Take 5 mg by mouth daily   10/15/2023 at am    omeprazole (PRILOSEC) 40 MG DR capsule Take 1 capsule (40 mg) by mouth daily 90 capsule 3 10/15/2023 at am    ondansetron (ZOFRAN-ODT) 4 MG ODT tab Take 1 tablet (4 mg) by mouth every 8 hours as needed for nausea 15 tablet 0 Past Month    pregabalin (LYRICA) 100 MG capsule Take 100 mg by mouth every morning   10/15/2023 at am    pregabalin (LYRICA) 100 MG capsule Take 200 mg by mouth at bedtime   10/15/2023 at pm    sodium chloride (OCEAN) 0.65 % nasal spray Spray 2 sprays in each side of the nose every 3 hours  "while awake. 44 mL 11 10/15/2023 at pm    SUMAtriptan (IMITREX) 25 MG tablet Take 25 mg by mouth at onset of headache for migraine May repeat in 2 hours.   More than a month    valACYclovir (VALTREX) 1000 mg tablet Take 2,000 mg by mouth 2 times daily as needed Take 2 tablets by mouth two times daily for one day. Use as needed at onset of cold sore.   Past Month    fluocinonide (LIDEX) 0.05 % external cream Apply 1 Application topically 2 times daily as needed Apply thinly to knee 2 times daily, Monday through Fridays, off on weekends as needed. Avoid face and skin folds.       Nutritional Supplements (ENSURE MAX PROTEIN) LIQD Take 240 mLs by mouth daily 7200 mL 11     Respiratory Therapy Supplies (NEBULIZER) BRENDAN Nebulizer device.  Albuterol nebulization every 2 hours as needed for shortness of breath or wheezing. 1 each 0         ALLERGIES: Amoxicillin-pot clavulanate, Hydrocodone, Hydrocodone-acetaminophen, Influenza vaccines, Latex, Oseltamivir, Penicillins, Vancomycin, Blood-group specific substance, Clavulanic acid, Fentanyl, Haemophilus b polysaccharide vaccine, Naltrexone, Other drug allergy (see comments), Oxycodone, Adhesive tape, Band-aid anti-itch, Cephalosporins, Lamotrigine, and No clinical screening - see comments    PHYSICAL EXAM:    /79 (BP Location: Left arm)   Pulse 75   Temp 98.1  F (36.7  C) (Oral)   Resp 18   Ht 1.575 m (5' 2\")   Wt 128 kg (282 lb 1.6 oz)   LMP 10/09/2023   SpO2 99%   BMI 51.60 kg/m      GENERAL: Pleasant, no obvious distress  NECK: Supple without adenopathy  EYES: No scleral icterus  LUNGS: Clear to auscultation bilaterally  HEART: Regular rate and rhythm, S1 and S2 present, no lower extremity edema  ABDOMEN: Non-distended. Positive bowel sounds. Soft, non-tender, no guarding/rebound/mass, no obvious organomegaly  MUSKULOSKELETAL:  Warm and well perfused, moves all extremities well  SKIN: No jaundice  NEUROLOGIC: Alert and oriented  PSYCHIATRIC: Normal " affect    LAB DATA:  CMP Results:   Recent Labs   Lab Test 10/16/23  0911 10/02/23  1328 09/23/23  2206 09/15/23  2358 06/27/23 2252 05/28/23 1953   NA  --  141 140 141   < > 141   POTASSIUM  --  4.4 4.6 3.6   < > 3.8   CHLORIDE  --  104 105 105   < > 103   CO2  --  24 21* 24   < > 27   ANIONGAP  --  13 14 12   < > 11   GLC 98 104* 128* 114*   < > 133*   BUN  --  10.6 8.8 9.9   < > 12.4   CR  --  0.63 0.63 0.77   < > 0.69   BILITOTAL  --  0.3  --  0.2  --  <0.2   ALKPHOS  --  70  --  69  --  95   ALT  --  24  --  19  --  34   AST  --  26  --  20  --  20    < > = values in this interval not displayed.      CBCNo lab results found in last 7 days.  INRNo lab results found in last 7 days.   Lipase   Date Value Ref Range Status   10/02/2023 40 13 - 60 U/L Final     Comment:     On 02/07/2023 the assay method at [facility] laboratory was changed to the Roche Lipase method on the Sam[employer] [id number]. Results obtained with different assay methods or kits cannot be used interchangeably, and therefore, direct comparison to results obtained from this laboratory prior to 02/07/2023 should be interpreted with caution, with each result interpreted in the context of its own reference interval.   04/30/2023 37 0 - 52 U/L Final   03/10/2023 21 0 - 52 U/L Final   02/10/2023 22 0 - 52 U/L Final   10/03/2022 112 73 - 393 U/L Final   09/24/2022 175 73 - 393 U/L Final   11/07/2020 105 73 - 393 U/L Final   10/31/2020 98 73 - 393 U/L Final   10/30/2020 75 73 - 393 U/L Final     IMAGING:  Narrative & Impression   EXAM: XR ABDOMEN PORT 1 VIEW  LOCATION: [facility]  DATE: 10/16/2023     INDICATION: eval for passed foreign body, free air.  COMPARISON: Abdomen radiographs 10/16/2023                                                                      IMPRESSION:      Similar position of a 6 cm in length metallic foreign body which projects near the midline within the lumen of the stomach.     Right  upper quadrant surgical clips from prior cholecystectomy.     Nonobstructive bowel gas pattern. No pneumatosis or pneumoperitoneum.       ASSESSMENT:  Foreign Body Ingestion   32 year old female with history of frequent foreign body ingestion here with abdominal pain and metal wire seen in stomach after she swallowed paper clip. Plan for EGD today. NPO.     PLAN:  NPO   EGD today  Likely discharge after EGD  Discussed with Dr. Kumar who will also visit with the patient.     TIME SPENT:  35 min including chart review, patient interview and care coordination.                                                Ivonne De La Rosa PA-C  Thank you for the opportunity to participate in the care of this patient.   Please feel free to call me with any questions or concerns.  Phone number [phone].              GI staff addendum    The patient was seen and examined, I agree with the above assessment and plan by Ivonne RHODES.  The patient is a 32 years old female with history of frequent foreign body ingestion, here for recurrent ingestion and abdominal pain.    Physical exam:  Alert awake and oriented  No acute distress  Vital stable  Abdomen: Soft nontender, no masses palpable.    Assessment  Foreign body ingestion, repeat x-ray today showing metallic foreign body near midline in stomach.    Plan.  Proceed with EGD.  Further recommendations after EGD.    Total time spent was 20 minutes.    Cruz Kumar MD

## 2023-10-16 NOTE — ED PROVIDER NOTES
EMERGENCY DEPARTMENT ENCOUNTER      NAME: Nevin Alvarado  YOB: 1990  MRN: [id number]    FINAL IMPRESSION  1. Swallowed foreign body, initial encounter        MEDICAL DECISION MAKING   Pertinent Labs & Imaging studies reviewed. (See chart for details)    Nevin Alvarado is a 32 year old female who presents for evaluation after swallowing a straightened paperclip around 2030.  She reports that she was listening to some music that triggered old thoughts and feelings and she impulsively made the decision to swallow paperclip.  She reports that she has a long history of swallowing foreign bodies but has not struggled with this behavior for about 7.5 months.  She endorses feeling anxious but attributes this to concerns about potentially disrupting her progress and care plan as well as possibility that removal may require surgery.  She denies current SI.  Patient was feeling well prior to listening to this music and has been taking all of her medications as prescribed, though notes that it seems as though her mental health is actually improved since she has gotten off many of them.  She currently complains of mild mid back pain but has no difficulty swallowing, breathing, abdominal pain, nausea.  She did not have any episodes of emesis prior to arrival.  She did not take any extra medications or swallow any other foreign bodies this evening.  Vitals on arrival stable and reassuring.  Patient is nontoxic-appearing with a benign exam.    Records reviewed.  Patient does have a care plan detailing recommendations around swallowed foreign bodies, as she has an extensive history of recurrent similar behaviors and is well-known to ENT and GI service.  I reviewed that care plan.  Patient is to have a one-to-one sitter and personal items removed.  It is advised that providers keep in mind that there may be a component of secondary gain regarding pain medications and as such, should try to limit IVs,  labs, and IV pain medications if possible.  It does not appear that patient has had any recent visits for swallowed foreign body, although was here 2 days ago with complaints of left foot pain and a chemical exposure of her eye.    I considered a broad differential including but not limited to swallowed foreign body, perforated viscus, pneumothorax, hemothorax, esophageal abrasion, anxiety, secondary gain, exacerbation of known psychiatric disorder.  I have seen patient in the past with similar complaints and today, she actually does seem more forward thinking with good insight into her behavior.  She is not currently expressing any mental health concerns and I do not believe she needs to meet with Dr. Team.  She appears nontoxic and well-hydrated.  Low suspicion for metabolic derangement or acute kidney injury requiring IV fluids and/or laboratory work-up.  We have agreed on plan to start with plain films of chest and abdomen.  Would like to keep patient n.p.o. and as such, we will plan to give a single dose of IV analgesic.    ED Course as of 10/16/23 0133   Mon Oct 16, 2023   0037 XR Chest 2 Views  Independently reviewed chest x-ray.  No evidence of metallic foreign body, pneumothorax, perforation, free air.   0037 XR Abdomen 2 Views  Independently reviewed abdominal x-ray.  There is a linear radiopaque foreign body over the mid abdomen, likely within the stomach.  No evidence of free air.     I discussed the case with Dr. Fleming of [facility].  He recommended admission to hospital medicine team for repeat x-rays in the morning.  If foreign body has not passed, will likely require intervention for removal.  Patient is nontoxic-appearing and there was no evidence of perforation on plain film so we have agreed that this does not need to happen on an emergent basis.      I rechecked the patient multiple times and reviewed results.  She did have some itching after dose of morphine and was given IV Benadryl with  improvement.  Recommended admission, which she was agreeable to.  She did inquire about options if she has any recurrence of pain and I explained that given location of the paperclip, she will likely not be experiencing much discomfort and she was comfortable with holding on further interventions for now.  I discussed the case with Dr. Cabrales who agreed to facilitate admission.  Will keep NPO.  No acute events under my care.    I independently reviewed XR of abdomen and chest (as noted above), formal radiologist read pending.      Medical Decision Making    History:  Supplemental history from: Documented in chart, if applicable  External Record(s) reviewed: Documented in chart, if applicable.    Work Up:  Chart documentation includes differential considered and any EKGs or imaging independently interpreted by provider, where specified.  In additional to work up documented, I considered the following work up: Documented in chart, if applicable.    External consultation:  Discussion of management with another provider: Documented in chart, if applicable and Gastroenterology    Complicating factors:  Care impacted by chronic illness: Diabetes, Hypertension, and Mental Health  Care affected by social determinants of health: N/A    Disposition considerations: Admit.          ED COURSE  10:36 PM I met the patient and performed my initial interview and exam.    12:07 AM Patient reports she is itching.   12:37 AM I rechecked and updated the patient.  Patient is still itching.   12:40 AM I spoke with Dr. Leary [facility].   1:15 AM I spoke with Dr. Cabrales, hospitalist.     MEDICATIONS GIVEN IN THE ED  Medications   melatonin tablet 1 mg (has no administration in time range)   ondansetron (ZOFRAN ODT) ODT tab 4 mg (has no administration in time range)     Or   ondansetron (ZOFRAN) injection 4 mg (has no administration in time range)   acetaminophen (TYLENOL) tablet 975 mg (has no administration in time range)   prochlorperazine  "(COMPAZINE) injection 10 mg (has no administration in time range)     Or   prochlorperazine (COMPAZINE) tablet 10 mg (has no administration in time range)     Or   prochlorperazine (COMPAZINE) suppository 25 mg (has no administration in time range)   morphine (PF) injection 2 mg (2 mg Intravenous $Given 10/15/23 2356)   diphenhydrAMINE (BENADRYL) injection 25 mg (25 mg Intravenous $Given 10/16/23 0015)   diphenhydrAMINE (BENADRYL) injection 25 mg (25 mg Intravenous $Given 10/16/23 0035)       NEW PRESCRIPTIONS STARTED AT TODAY'S VISIT  New Prescriptions    No medications on file          =================================================================    Chief Complaint   Patient presents with    Swallowed Foreign Body         HPI:    Patient information was obtained from: Patient    Use of : N/A     Nevin Alvarado is a 32 year old female who presents for evaluation of swallowed foreign body.     Patient reports that she was listening to \"negative music\" which she has not in a long time. This prompted her to swallow an open paper clip around 2030. Since has had chest burning with deep breaths and feels a poking sensation in her mid back. Has had nausea but no vomiting. Patient was feeling fine prior to listening to the music. Has not spit up any blood and denies any cough. She reports that she has felt better since stopping her medications. Denies any other complaints at this time.       RELEVANT HISTORY, MEDICATIONS, & ALLERGIES   Past medical history, surgical history, family history, medications, and allergies reviewed and pertinent noted in HPI.    REVIEW OF SYSTEMS:  A complete review of systems was performed with pertinent positives and negatives noted in the HPI. All other systems negative.     PHYSICAL EXAM:    Vitals: BP (!) 141/82   Pulse 92   Temp 98.4  F (36.9  C) (Oral)   Resp 18   Ht 1.575 m (5' 2\")   Wt 127.9 kg (282 lb)   LMP 10/09/2023   SpO2 97%   BMI 51.58 kg/m   "   General: Alert and interactive, comfortable appearing.  HENT: Atraumatic. Full AROM of neck. Conjunctiva clear.   Cardiovascular: Regular rate and rhythm.   Chest/Pulmonary: Normal work of breathing. Speaking in complete sentences. Lungs CTAB. No chest wall tenderness or deformities.  Abdomen: Soft, nondistended. Nontender without guarding or rebound.  Extremities: Normal AROM of all major joints.  Skin: Warm and dry. Normal skin color.   Neuro: Speech clear. CNs grossly intact. Moves all extremities spontaneously.   Psych: Normal mood, cooperative, memory appropriate. Anxious affect. Denies SI or HI.    RADIOLOGY  XR Chest 2 Views   Final Result   IMPRESSION: PA and lateral views of the chest were obtained. Cardiomediastinal silhouette is within normal limits. Asymmetric elevation of the right hemidiaphragm as compared to the left. No suspicious focal pulmonary opacities. No significant pleural    effusion or pneumothorax. Incompletely characterized metallic object in the upper abdomen, seen on the lateral view, better characterized on same-day abdominal x-ray.         XR Abdomen 2 Views   Final Result   IMPRESSION: A 6 cm linear density in the midabdomen is compatible with provided history of swallowed paper clip, likely located within the stomach. Bowel gas pattern is normal. No intraperitoneal free air. Mild reverse S-shaped scoliosis of the    thoracolumbar spine redemonstrated.            I, Jim Kate, am serving as a scribe to document services personally performed by Dr. Becca Bee based on my observation and the provider's statements to me. I, Becca Bee MD attest that Jmi Kate is acting in a scribe capacity, has observed my performance of the services and has documented them in accordance with my direction.    Becca Bee M.D.  Emergency Medicine  [facility] EMERGENCY ROOM  [address]  [id number]  [phone]  Dept: [phone]     Becca Bee MD  10/16/23 0133       Becca Bee MD  10/16/23 0221

## 2023-10-16 NOTE — PHARMACY-ADMISSION MEDICATION HISTORY
Pharmacist Admission Medication History    Admission medication history is complete. The information provided in this note is only as accurate as the sources available at the time of the update.    Information Source(s): Patient and CareEverywhere/SureScripts via in-person    Pertinent Information: None    Changes made to PTA medication list:  Added: Norethindrone  Deleted: desvenlafaxine, Semaglutide oral, IBU, ketorolac, gabapentin cream, cyclobenzaprine  Changed: Hydroxychloroquine from BID, pregabalin from 100 mg TID, brexpiprazole from 2 mg,     Medication Affordability:  Not including over the counter (OTC) medications, was there a time in the past 3 months when you did not take your medications as prescribed because of cost?: No      Medication History Completed By: Doug Barrientos RPH 10/16/2023 9:01 AM    PTA Med List   Medication Sig Note Last Dose    albuterol (PROAIR HFA/PROVENTIL HFA/VENTOLIN HFA) 108 (90 Base) MCG/ACT inhaler Inhale 2 puffs into the lungs every 6 hours as needed for shortness of breath / dyspnea or wheezing  Past Week    albuterol (PROVENTIL) (2.5 MG/3ML) 0.083% neb solution Take 2.5 mg by nebulization every 6 hours as needed for shortness of breath or wheezing  Past Month    Ayr Saline Nasal No-Drip GEL Spray 1 Application in nostril daily Apply into each nare 2 times daily Place in front of each side of your nose and breathe in to distribute gel daily. - Each Nare  10/15/2023 at pm    BANOPHEN 2-0.1 % external cream Apply 1 applicator topically 2 times daily as needed for itching  Past Month    brexpiprazole (REXULTI) 1 MG tablet Take 1 mg by mouth at bedtime  10/15/2023 at pm    cetirizine (ZYRTEC) 10 MG tablet Take 1 tablet (10 mg) by mouth daily  10/15/2023 at PM takes at hs    Cholecalciferol (D3 HIGH POTENCY) 25 MCG (1000 UT) CAPS Take 50 mcg by mouth daily  10/15/2023 at am    clonazePAM (KLONOPIN) 0.5 MG tablet Take 0.5mg daily PRN anxiety- 20 tablets per month, try to keep  it to 3-4x per week  Past Week    erythromycin (ROMYCIN) 5 MG/GM ophthalmic ointment Place 0.5 inches into the right eye 6 times daily for 7 days 10/16/2023: Patient states she should be using this but has not been Past Week    ferrous sulfate (FEROSUL) 325 (65 Fe) MG tablet Take 1 tablet (325 mg) by mouth daily (with breakfast)  10/15/2023 at am    furosemide (LASIX) 20 MG tablet Take 20 mg by mouth daily  10/15/2023 at am    hydroxychloroquine (PLAQUENIL) 200 MG tablet Take 200 mg by mouth daily  10/15/2023 at am    metFORMIN (GLUCOPHAGE XR) 500 MG 24 hr tablet Take 1,000 mg by mouth daily (with breakfast)  10/15/2023 at am    montelukast (SINGULAIR) 10 MG tablet Take 10 mg by mouth every evening  10/15/2023 at pm    nabumetone (RELAFEN) 750 MG tablet Take 750 mg by mouth 2 times daily  10/15/2023 at pm    norethindrone (AYGESTIN) 5 MG tablet Take 5 mg by mouth daily  10/15/2023 at am    omeprazole (PRILOSEC) 40 MG DR capsule Take 1 capsule (40 mg) by mouth daily  10/15/2023 at am    ondansetron (ZOFRAN-ODT) 4 MG ODT tab Take 1 tablet (4 mg) by mouth every 8 hours as needed for nausea  Past Month    pregabalin (LYRICA) 100 MG capsule Take 100 mg by mouth every morning  10/15/2023 at am    pregabalin (LYRICA) 100 MG capsule Take 200 mg by mouth at bedtime  10/15/2023 at pm    Semaglutide-Weight Management (WEGOVY) 0.5 MG/0.5ML pen Inject 0.5 mg Subcutaneous every 7 days  10/15/2023    sodium chloride (OCEAN) 0.65 % nasal spray Spray 2 sprays in each side of the nose every 3 hours while awake.  10/15/2023 at pm    SUMAtriptan (IMITREX) 25 MG tablet Take 25 mg by mouth at onset of headache for migraine May repeat in 2 hours.  More than a month    valACYclovir (VALTREX) 1000 mg tablet Take 2,000 mg by mouth 2 times daily as needed Take 2 tablets by mouth two times daily for one day. Use as needed at onset of cold sore.  Past Month

## 2023-10-16 NOTE — ANESTHESIA POSTPROCEDURE EVALUATION
Patient: Nevin Alvarado    Procedure: Procedure(s):  ESOPHAGOGASTRODUODENOSCOPY (EGD) WITH FOREIGN BODY REMOVAL       Anesthesia Type:  General    Note:  Disposition: Inpatient   Postop Pain Control: Uneventful            Sign Out: Well controlled pain   PONV: No   Neuro/Psych: Uneventful            Sign Out: Acceptable/Baseline neuro status   Airway/Respiratory: Uneventful            Sign Out: Acceptable/Baseline resp. status   CV/Hemodynamics: Uneventful            Sign Out: Acceptable CV status; No obvious hypovolemia; No obvious fluid overload   Other NRE:    DID A NON-ROUTINE EVENT OCCUR?            Last vitals:  Vitals Value Taken Time   /72 10/16/23 1417   Temp 36.1  C (97  F) 10/16/23 1416   Pulse 90 10/16/23 1421   Resp 43 10/16/23 1421   SpO2 97 % 10/16/23 1421   Vitals shown include unfiled device data.    Electronically Signed By: Barney Perez MD  October 16, 2023  2:44 PM

## 2023-10-16 NOTE — ED TRIAGE NOTES
Arrives via ems after swallowing straightened out paper clip earlier tonight at approx. 2030.  Pt states she has not swallowed foreign objects in 7.5 months but was listening to old music and it brought up old feelings which led to swallowing objects  Rating pain in throat 8/10.    Triage Assessment (Adult)       Row Name 10/15/23 9485          Triage Assessment    Airway WDL WDL        Respiratory WDL    Respiratory WDL WDL        Skin Circulation/Temperature WDL    Skin Circulation/Temperature WDL WDL        Cardiac WDL    Cardiac WDL WDL        Peripheral/Neurovascular WDL    Peripheral Neurovascular WDL WDL        Cognitive/Neuro/Behavioral WDL    Cognitive/Neuro/Behavioral WDL WDL        [place] Coma Scale    Best Eye Response 4-->(E4) spontaneous     Best Motor Response 6-->(M6) obeys commands     Best Verbal Response 5-->(V5) oriented     [place] Coma Scale Score 15

## 2023-10-16 NOTE — ANESTHESIA CARE TRANSFER NOTE
Patient: Nevin Alvarado    Procedure: Procedure(s):  ESOPHAGOGASTRODUODENOSCOPY (EGD) WITH FOREIGN BODY REMOVAL       Diagnosis: Swallowed foreign body, initial encounter [T18.9XXA]  Diagnosis Additional Information: No value filed.    Anesthesia Type:   General     Note:      Level of Consciousness: awake  Oxygen Supplementation: face mask  Level of Supplemental Oxygen (L/min / FiO2): 6  Independent Airway: airway patency satisfactory and stable  Dentition: dentition unchanged  Vital Signs Stable: post-procedure vital signs reviewed and stable  Report to RN Given: handoff report given  Patient transferred to: Phase II    Handoff Report: Identifed the Patient, Identified the Reponsible Provider, Reviewed the pertinent medical history, Discussed the surgical course, Reviewed Intra-OP anesthesia mangement and issues during anesthesia, Set expectations for post-procedure period and Allowed opportunity for questions and acknowledgement of understanding      Vitals:  Vitals Value Taken Time   /65 10/16/23 1401   Temp 36.2  C (97.1  F) 10/16/23 1358   Pulse 93 10/16/23 1400   Resp 21 10/16/23 1400   SpO2 98 % 10/16/23 1400   Vitals shown include unfiled device data.    Electronically Signed By: HU AGUIRRE CRNA  October 16, 2023  2:02 PM

## 2023-10-16 NOTE — H&P
GENERAL PRE-PROCEDURE:   Procedure:  EGD  Date/Time:  10/16/2023 12:46 PM    Verbal consent obtained?: Yes    Risks and benefits: Risks, benefits and alternatives were discussed    Consent given by:  Guardian  Patient states understanding of procedure being performed: Yes    Patient's understanding of procedure matches consent: Yes    Procedure consent matches procedure scheduled: Yes    Expected level of sedation:  Deep  Appropriately NPO:  Yes  ASA Class:  4  Mallampati  :  Grade 2- soft palate, base of uvula, tonsillar pillars, and portion of posterior pharyngeal wall visible  Lungs:  Lungs clear with good breath sounds bilaterally  Heart:  Normal heart sounds and rate  History & Physical reviewed:  History and physical reviewed and no updates needed  Statement of review:  I have reviewed the lab findings, diagnostic data, medications, and the plan for sedation

## 2023-10-16 NOTE — ANESTHESIA PREPROCEDURE EVALUATION
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 distance: > 3 FB   Neck ROM: full   Mouth opening: > 3 cm    Respiratory Devices and Support         Dental  no notable dental history         Cardiovascular   cardiovascular exam normal          Pulmonary   pulmonary exam normal                OUTSIDE LABS:  CBC:   Lab Results    Component Value Date    WBC 6.8 10/02/2023    WBC 8.8 09/23/2023    HGB 13.6 10/02/2023    HGB 14.3 09/23/2023    HCT 40.3 10/02/2023    HCT 43.1 09/23/2023     10/02/2023     09/23/2023     BMP:   Lab Results   Component Value Date     10/02/2023     09/23/2023    POTASSIUM 4.4 10/02/2023    POTASSIUM 4.6 09/23/2023    CHLORIDE 104 10/02/2023    CHLORIDE 105 09/23/2023    CO2 24 10/02/2023    CO2 21 (L) 09/23/2023    BUN 10.6 10/02/2023    BUN 8.8 09/23/2023    CR 0.63 10/02/2023    CR 0.63 09/23/2023    GLC 98 10/16/2023     (H) 10/02/2023     COAGS:   Lab Results   Component Value Date    PTT 24 01/15/2023    INR 1.11 01/15/2023     POC:   Lab Results   Component Value Date     (H) 01/10/2021    HCG Negative 10/02/2023    HCGS Negative 09/04/2023     HEPATIC:   Lab Results   Component Value Date    ALBUMIN 4.5 10/02/2023    PROTTOTAL 7.3 10/02/2023    ALT 24 10/02/2023    AST 26 10/02/2023    ALKPHOS 70 10/02/2023    BILITOTAL 0.3 10/02/2023     OTHER:   Lab Results   Component Value Date    LACT 2.1 (H) 10/08/2022    KELBY 9.7 10/02/2023    PHOS 3.5 12/01/2019    MAG 1.8 02/10/2023    LIPASE 40 10/02/2023    AMYLASE 29 02/08/2022    TSH 4.47 (H) 06/27/2023    T4 1.17 06/27/2023    CRP 1.3 (H) 02/18/2023    SED 20 05/28/2023       Anesthesia Plan    ASA Status:  4    NPO Status:  ELEVATED Aspiration Risk/Unknown    Anesthesia Type: General.     - Airway: ETT   Induction: Propofol, Intravenous, RSI.   Maintenance: Balanced.        Consents    Anesthesia Plan(s) and associated risks, benefits, and realistic alternatives discussed. Questions answered and patient/representative(s) expressed understanding.     - Discussed:     - Discussed with:  Patient, Legal Guardian      - Specific Concerns: spoke with guardian by phone, [facility] RN witness.     - Extended Intubation/Ventilatory Support Discussed: No.      - Patient is DNR/DNI Status: No     Use of blood products discussed: No  .     Postoperative Care       PONV prophylaxis: Dexamethasone or Solumedrol, Ondansetron (or other 5HT-3)     Comments:    Other Comments: Decadron, Zofran  RSI - on GLP1 agonist injection . Last dose yesterday.  SILVIANO  Patient requests pre op sedation            Barney Perez MD

## 2023-10-16 NOTE — ED NOTES
Bed: [facility]  Expected date: 10/15/23  Expected time: 9:42 PM  Means of arrival: Ambulance  Comments:

## 2023-10-16 NOTE — PROGRESS NOTES
PRIMARY DIAGNOSIS: ACUTE PAIN  OUTPATIENT/OBSERVATION GOALS TO BE MET BEFORE DISCHARGE:  1. Pain Status: Improved but still requiring IV narcotics.    2. Return to near baseline physical activity: Yes using walker    3. Cleared for discharge by consultants (if involved): No    Discharge Planner Nurse   Safe discharge environment identified: No  Barriers to discharge: Yes EGD       Entered by: Doreen Amato RN 10/16/2023 1:18 PM     Please review provider order for any additional goals.   Nurse to notify provider when observation goals have been met and patient is ready for discharge.

## 2023-10-16 NOTE — PROGRESS NOTES
Patient able to tolerate oral diet with no nausea or vomiting. Group home was called and [facility]  the patient.

## 2023-10-16 NOTE — DISCHARGE SUMMARY
[facility] MEDICINE  DISCHARGE SUMMARY     Primary Care Physician: Latonya Knight  Admission Date: 10/15/2023   Discharge Provider: Diana Santana MD Discharge Date: 10/16/2023   Diet:   Active Diet and Nourishment Order   Procedures    Advance Diet as Tolerated: Clear Liquid Diet    Diet       Code Status: Full Code   Activity: usual        Condition at Discharge: Stable     REASON FOR PRESENTATION(See Admission Note for Details)     Foreign body ingestion    PRINCIPAL & ACTIVE DISCHARGE DIAGNOSES      Foreign body ingestion; post paperclip ingestion  Borderline personality  ADD  PTSD  History of recurrent ingestions/rectal foreign bodies    PENDING LABS     Unresulted Labs Ordered in the Past 30 Days of this Admission       No orders found for last 31 day(s).              PROCEDURES ( this hospitalization only)      Procedure(s):  ESOPHAGOGASTRODUODENOSCOPY (EGD) WITH FOREIGN BODY REMOVAL    RECOMMENDATIONS TO OUTPATIENT PROVIDER FOR F/U VISIT     Follow-up Appointments     Follow-up and recommended labs and tests       Regular follow up            DISPOSITION     home    SUMMARY OF HOSPITAL COURSE:      32 year old female into [facility] on 10/15/2023 after arriving to the hospital for an admitted foreign body ingestion.  Pt states she swallowed an  Open paper clip a few hours earlier to arrival here.  Pt has a known history  Of problems with esophageal and rectal foreign bodies.  Arrival imaging per  Plain film xray showed the paperclip likely open.  She had no free air. She  Was admitted for clinical support and GI consult.  On the following morning  She had some epigastric pain.  A repeat XR abdomen was unchanged.  She received an EGD for FB retrieval which occurred successfully. She  Did well post op.  She ate, drank and walked.  Hospital course was otherwise  Uncomplicated. Pt is discharged to her group home.      Discharge Medications with Med changes:     Current  Discharge Medication List        CONTINUE these medications which have NOT CHANGED    Details   albuterol (PROAIR HFA/PROVENTIL HFA/VENTOLIN HFA) 108 (90 Base) MCG/ACT inhaler Inhale 2 puffs into the lungs every 6 hours as needed for shortness of breath / dyspnea or wheezing  Qty: 18 g, Refills: 0    Comments: Pharmacy may dispense brand covered by insurance (Proair, or proventil or ventolin or generic albuterol inhaler)      albuterol (PROVENTIL) (2.5 MG/3ML) 0.083% neb solution Take 2.5 mg by nebulization every 6 hours as needed for shortness of breath or wheezing      Ayr Saline Nasal No-Drip GEL Spray 1 Application in nostril daily Apply into each nare 2 times daily Place in front of each side of your nose and breathe in to distribute gel daily. - Each Nare  Qty: 22 mL, Refills: 11    Associated Diagnoses: Nasal obstruction      BANOPHEN 2-0.1 % external cream Apply 1 applicator topically 2 times daily as needed for itching      brexpiprazole (REXULTI) 1 MG tablet Take 1 mg by mouth at bedtime      cetirizine (ZYRTEC) 10 MG tablet Take 1 tablet (10 mg) by mouth daily  Qty: 30 tablet, Refills: 0    Associated Diagnoses: Pica in adults; Gastroesophageal reflux disease without esophagitis      Cholecalciferol (D3 HIGH POTENCY) 25 MCG (1000 UT) CAPS Take 50 mcg by mouth daily      clonazePAM (KLONOPIN) 0.5 MG tablet Take 0.5mg daily PRN anxiety- 20 tablets per month, try to keep it to 3-4x per week      erythromycin (ROMYCIN) 5 MG/GM ophthalmic ointment Place 0.5 inches into the right eye 6 times daily for 7 days  Qty: 3.5 g, Refills: 0      ferrous sulfate (FEROSUL) 325 (65 Fe) MG tablet Take 1 tablet (325 mg) by mouth daily (with breakfast)  Qty: 30 tablet, Refills: 0    Associated Diagnoses: Red blood cell antibody positive      furosemide (LASIX) 20 MG tablet Take 20 mg by mouth daily      hydroxychloroquine (PLAQUENIL) 200 MG tablet Take 200 mg by mouth daily      metFORMIN (GLUCOPHAGE XR) 500 MG 24 hr tablet  Take 1,000 mg by mouth daily (with breakfast)      montelukast (SINGULAIR) 10 MG tablet Take 10 mg by mouth every evening      nabumetone (RELAFEN) 750 MG tablet Take 750 mg by mouth 2 times daily      norethindrone (AYGESTIN) 5 MG tablet Take 5 mg by mouth daily      omeprazole (PRILOSEC) 40 MG DR capsule Take 1 capsule (40 mg) by mouth daily  Qty: 90 capsule, Refills: 3    Associated Diagnoses: Gastroesophageal reflux disease, unspecified whether esophagitis present      ondansetron (ZOFRAN-ODT) 4 MG ODT tab Take 1 tablet (4 mg) by mouth every 8 hours as needed for nausea  Qty: 15 tablet, Refills: 0    Associated Diagnoses: Gastroesophageal reflux disease without esophagitis      !! pregabalin (LYRICA) 100 MG capsule Take 100 mg by mouth every morning      !! pregabalin (LYRICA) 100 MG capsule Take 200 mg by mouth at bedtime      Semaglutide-Weight Management (WEGOVY) 0.5 MG/0.5ML pen Inject 0.5 mg Subcutaneous every 7 days  Qty: 2 mL, Refills: 1    Associated Diagnoses: Class 3 severe obesity with serious comorbidity and body mass index (BMI) of 50.0 to 59.9 in adult, unspecified obesity type (H)      sodium chloride (OCEAN) 0.65 % nasal spray Spray 2 sprays in each side of the nose every 3 hours while awake.  Qty: 44 mL, Refills: 11    Associated Diagnoses: Nasal septal perforation      SUMAtriptan (IMITREX) 25 MG tablet Take 25 mg by mouth at onset of headache for migraine May repeat in 2 hours.      valACYclovir (VALTREX) 1000 mg tablet Take 2,000 mg by mouth 2 times daily as needed Take 2 tablets by mouth two times daily for one day. Use as needed at onset of cold sore.      fluocinonide (LIDEX) 0.05 % external cream Apply 1 Application topically 2 times daily as needed Apply thinly to knee 2 times daily, Monday through Fridays, off on weekends as needed. Avoid face and skin folds.      Nutritional Supplements (ENSURE MAX PROTEIN) LIQD Take 240 mLs by mouth daily  Qty: 7200 mL, Refills: 11    Comments: Ensure  "Max or Equivalent. 150 calories and 30 grams of protein  Associated Diagnoses: Nutritional counseling      Respiratory Therapy Supplies (NEBULIZER) BRENDAN Nebulizer device.  Albuterol nebulization every 2 hours as needed for shortness of breath or wheezing.  Qty: 1 each, Refills: 0    Comments: Include tubing and mask for age please.       !! - Potential duplicate medications found. Please discuss with provider.                      Consults       GASTROENTEROLOGY IP CONSULT    Immunizations given this encounter     Most Recent Immunizations   Administered Date(s) Administered    COVID-19 Bivalent 12+ (Pfizer) 01/12/2023    COVID-19 MONOVALENT 12+ (Pfizer) 09/29/2021    DTAP (<7y) 04/07/1997    FLU 6-35 months 09/16/2010    Flu, Unspecified 09/26/2017    HPV 12/03/2007    HPV Quadrivalent 12/03/2007    HepB, Unspecified 11/05/1996    Hepatitis B Immunity: Titer 04/20/2015    Hepatitis B, Peds 11/05/1996    Historic Hib Hib-titer 02/03/1993    Historical DTP/aP 04/06/1993    Influenza (IIV3) PF 09/26/2017    Influenza Vaccine >6 months (Alfuria,Fluzone) 09/29/2021    MMR 12/11/2003    OPV, trivalent, live 04/07/1997    OPV, unspecified 04/07/1997    Pneumococcal 23 valent 09/29/2021    TDAP Vaccine (Adacel) 04/16/2015    TDAP Vaccine (Boostrix) 04/16/2015    Td (Adult), Adsorbed 12/11/2003    Twinrix A/B 04/16/2015    Varicella 08/17/1995           Anticoagulation Information      Recent INR results: No results for input(s): \"INR\" in the last 168 hours.  Warfarin doses (if applicable) or name of other anticoagulant:       SIGNIFICANT IMAGING FINDINGS     Results for orders placed or performed during the hospital encounter of 10/15/23   XR Chest 2 Views    Impression    IMPRESSION: PA and lateral views of the chest were obtained. Cardiomediastinal silhouette is within normal limits. Asymmetric elevation of the right hemidiaphragm as compared to the left. No suspicious focal pulmonary opacities. No significant pleural "   effusion or pneumothorax. Incompletely characterized metallic object in the upper abdomen, seen on the lateral view, better characterized on same-day abdominal x-ray.     XR Abdomen 2 Views    Impression    IMPRESSION: A 6 cm linear density in the midabdomen is compatible with provided history of swallowed paper clip, likely located within the stomach. Bowel gas pattern is normal. No intraperitoneal free air. Mild reverse S-shaped scoliosis of the   thoracolumbar spine redemonstrated.   XR Abdomen Port 1 View    Impression    IMPRESSION:     Similar position of a 6 cm in length metallic foreign body which projects near the midline within the lumen of the stomach.    Right upper quadrant surgical clips from prior cholecystectomy.    Nonobstructive bowel gas pattern. No pneumatosis or pneumoperitoneum.       SIGNIFICANT LABORATORY FINDINGS     Most Recent 3 BMP's:  Recent Labs   Lab Test 10/16/23  0911 10/02/23  1328 09/23/23  2206 09/15/23  2358   NA  --  141 140 141   POTASSIUM  --  4.4 4.6 3.6   CHLORIDE  --  104 105 105   CO2  --  24 21* 24   BUN  --  10.6 8.8 9.9   CR  --  0.63 0.63 0.77   ANIONGAP  --  13 14 12   KELBY  --  9.7 9.5 9.6   GLC 98 104* 128* 114*           Discharge Orders        Reason for your hospital stay    Foreign body ingestion     Follow-up and recommended labs and tests     Regular follow up     Activity    As tolerated     Diet    Advance diet as tolerated       Examination   Physical Exam   Temp:  [97  F (36.1  C)-98.6  F (37  C)] 97  F (36.1  C)  Pulse:  [] 84  Resp:  [11-22] 11  BP: (117-152)/(63-82) 138/63  SpO2:  [96 %-100 %] 96 %  Wt Readings from Last 1 Encounters:   10/16/23 127.9 kg (282 lb)       General Appearance: I am itching from the fentanyl  Respiratory: clear bilaterally  Cardiovascular: regular  GI: soft, nonfocal  Skin: no rashes, no urticaria         Please see EMR for more detailed significant labs, imaging, consultant notes etc.    I spent less than 30 minutes  on this discharge    Diana Santana MD  [facility]    CC:Latonya Knight

## 2023-10-16 NOTE — PROGRESS NOTES
[facility] note.  EGD done showing:                       - Normal esophagus.                          - Paperclip (straigntened) were found in the stomach.                          Removal was successful.                          - Normal examined duodenum.   Recommendation:        - Advance diet as tolerated.                          - [facility] to [facility] for [facility].                          - GI service will sign off, we will be available as                          needed.                                                                                      Cruz Kumar MD

## 2023-10-16 NOTE — PLAN OF CARE
"Patient has met all goals. Her pain has decreased since the swallowed paper clip has been removed.   Problem: Adult Inpatient Plan of Care  Goal: Plan of Care Review  Description: The Plan of Care Review/Shift note should be completed every shift.  The Outcome Evaluation is a brief statement about your assessment that the patient is improving, declining, or no change.  This information will be displayed automatically on your shift  note.  Outcome: Met  Goal: Patient-Specific Goal (Individualized)  Description: You can add care plan individualizations to a care plan. Examples of Individualization might be:  \"Parent requests to be called daily at 9am for status\", \"I have a hard time hearing out of my right ear\", or \"Do not touch me to wake me up as it startles  me\".  Outcome: Met  Goal: Absence of Hospital-Acquired Illness or Injury  Outcome: Met  Intervention: Identify and Manage Fall Risk  Recent Flowsheet Documentation  Taken 10/16/2023 8722 by Doreen Amato RN  Safety Promotion/Fall Prevention:   safety round/check completed   room near nurse's station   room door open   clutter free environment maintained  Goal: Optimal Comfort and Wellbeing  Outcome: Met  Goal: Readiness for Transition of Care  Outcome: Met   Goal Outcome Evaluation:                        "

## 2023-10-16 NOTE — PROGRESS NOTES
Care Management Follow Up    Length of Stay (days): 0    Expected Discharge Date: 10/17/2023     Concerns to be Addressed:    MALIHA    Patient plan of care discussed at interdisciplinary rounds: Yes      Additional Information:  Patient has Guardian through [employer]([phone]) Writer spoke to Luke Lujan and MALIHA reviewed over phone.  Luke states understanding patient is OBS.      Rhea Kaur, CM

## 2023-10-16 NOTE — PROGRESS NOTES
"PRIMARY DIAGNOSIS: \"GENERIC\" NURSING  OUTPATIENT/OBSERVATION GOALS TO BE MET BEFORE DISCHARGE:  ADLs back to baseline: Yes    Activity and level of assistance: Up with standby assistance.    Pain status: Improved but still requiring IV narcotics.    Return to near baseline physical activity: Yes     Discharge Planner Nurse   Safe discharge environment identified: No  Barriers to discharge: No       Entered by: Doreen Amato RN 10/16/2023 1:20 PM     Please review provider order for any additional goals.   Nurse to notify provider when observation goals have been met and patient is ready for discharge.  "

## 2023-10-17 DIAGNOSIS — E66.813 CLASS 3 SEVERE OBESITY WITH SERIOUS COMORBIDITY AND BODY MASS INDEX (BMI) OF 50.0 TO 59.9 IN ADULT, UNSPECIFIED OBESITY TYPE (H): ICD-10-CM

## 2023-10-17 DIAGNOSIS — E66.01 CLASS 3 SEVERE OBESITY WITH SERIOUS COMORBIDITY AND BODY MASS INDEX (BMI) OF 50.0 TO 59.9 IN ADULT, UNSPECIFIED OBESITY TYPE (H): ICD-10-CM

## 2023-10-17 RX ORDER — SEMAGLUTIDE 0.5 MG/.5ML
0.5 INJECTION, SOLUTION SUBCUTANEOUS
Qty: 2 ML | Status: CANCELLED | OUTPATIENT
Start: 2023-10-17

## 2023-10-17 NOTE — TELEPHONE ENCOUNTER
Medication Question or Refill    Contacts         Type Contact Phone/Fax    10/17/2023 01:26 PM CDT Phone (Incoming) Nevin Alvarado (Self) [phone] (M)            What medication are you calling about (include dose and sig)?:     Semaglutide-Weight Management (WEGOVY) 0.5 MG/0.5ML pen     Preferred Pharmacy:  [facility] - [address] [address]  Phone: [phone] Fax: [phone]      Controlled Substance Agreement on file:   CSA -- Patient Level:    CSA: None found at the patient level.       Who prescribed the medication?: Muna    Do you need a refill? Yes    When did you use the medication last? Has one shot left for this Sunday        Do you have any questions or concerns?  Yes:   >>please call her. No supply...She's called [facility] within 30 mi, and needs to know what's next      Could we send this information to you in [facility] or would you prefer to receive a phone call?:   Patient would prefer a phone call   Okay to leave a detailed message?: Yes at Cell number on file:    Telephone Information:   Mobile [phone]

## 2023-10-17 NOTE — TELEPHONE ENCOUNTER
Semaglutide-Weight Management (WEGOVY) 0.5 MG/0.5ML pen   please call her. No supply...She's called [facility] within 30 mi, and needs to know what's next     Alma Bedoya RN  Central Triage Red Flags/Med Refills

## 2023-10-18 DIAGNOSIS — Z09 HOSPITAL DISCHARGE FOLLOW-UP: ICD-10-CM

## 2023-10-20 ENCOUNTER — HOSPITAL ENCOUNTER (EMERGENCY)
Facility: CLINIC | Age: 33
Discharge: NURSING, REHAB OR LONG-TERM CARE | End: 2023-10-20
Attending: EMERGENCY MEDICINE | Admitting: EMERGENCY MEDICINE
Payer: COMMERCIAL

## 2023-10-20 ENCOUNTER — APPOINTMENT (OUTPATIENT)
Dept: RADIOLOGY | Facility: CLINIC | Age: 33
End: 2023-10-20
Attending: EMERGENCY MEDICINE
Payer: COMMERCIAL

## 2023-10-20 VITALS
TEMPERATURE: 97.7 F | SYSTOLIC BLOOD PRESSURE: 137 MMHG | OXYGEN SATURATION: 100 % | DIASTOLIC BLOOD PRESSURE: 75 MMHG | RESPIRATION RATE: 20 BRPM | HEART RATE: 94 BPM | HEIGHT: 62 IN | BODY MASS INDEX: 51.89 KG/M2 | WEIGHT: 282 LBS

## 2023-10-20 DIAGNOSIS — M54.6 ACUTE RIGHT-SIDED THORACIC BACK PAIN: ICD-10-CM
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
## 2023-10-20 DIAGNOSIS — M54.50 ACUTE LOW BACK PAIN, UNSPECIFIED BACK PAIN LATERALITY, UNSPECIFIED WHETHER SCIATICA PRESENT: ICD-10-CM

## 2023-10-20 PROCEDURE — 72070 X-RAY EXAM THORAC SPINE 2VWS: CPT

## 2023-10-20 PROCEDURE — 71045 X-RAY EXAM CHEST 1 VIEW: CPT

## 2023-10-20 PROCEDURE — 250N000013 HC RX MED GY IP 250 OP 250 PS 637: Performed by: EMERGENCY MEDICINE

## 2023-10-20 PROCEDURE — 72100 X-RAY EXAM L-S SPINE 2/3 VWS: CPT

## 2023-10-20 PROCEDURE — 99284 EMERGENCY DEPT VISIT MOD MDM: CPT | Mod: 25

## 2023-10-20 RX ORDER — CYCLOBENZAPRINE HCL 10 MG
10 TABLET ORAL ONCE
Status: COMPLETED | OUTPATIENT
Start: 2023-10-20 | End: 2023-10-20

## 2023-10-20 RX ORDER — LIDOCAINE 4 G/G
1 PATCH TOPICAL EVERY 24 HOURS
Qty: 10 PATCH | Refills: 0 | Status: SHIPPED | OUTPATIENT
Start: 2023-10-20 | End: 2023-12-04

## 2023-10-20 RX ORDER — CYCLOBENZAPRINE HCL 10 MG
10 TABLET ORAL 3 TIMES DAILY PRN
Qty: 20 TABLET | Refills: 0 | Status: SHIPPED | OUTPATIENT
Start: 2023-10-20

## 2023-10-20 RX ORDER — LIDOCAINE 4 G/G
1 PATCH TOPICAL ONCE
Status: DISCONTINUED | OUTPATIENT
Start: 2023-10-20 | End: 2023-10-21 | Stop reason: HOSPADM

## 2023-10-20 RX ADMIN — CYCLOBENZAPRINE 10 MG: 10 TABLET, FILM COATED ORAL at 20:11

## 2023-10-20 RX ADMIN — LIDOCAINE 1 PATCH: 4 PATCH TOPICAL at 21:18

## 2023-10-20 ASSESSMENT — ACTIVITIES OF DAILY LIVING (ADL): ADLS_ACUITY_SCORE: 41

## 2023-10-21 NOTE — DISCHARGE INSTRUCTIONS
You were seen in the emergency department for fall with back pain.  Your evaluation included x-rays which looked reassuring.  We do think that your pain is related to a strain/muscle contusion in the areas affected.  We would expect that this will get better but may remain sore for a few days.  We would recommend using Tylenol 500 mg every 6 hours and you can also use Flexeril 10 mg up to 3 times a day and 1-2 lidocaine patches for 12 hours at a time on the areas of greatest discomfort.

## 2023-10-21 NOTE — ED PROVIDER NOTES
"EMERGENCY DEPARTMENT ENCOUNTER      NAME: Nevin Alvarado  AGE: 32 year old female  YOB: 1990  MRN: [id number]  EVALUATION DATE & TIME: 10/20/2023  7:50 PM    PCP: Latonya Knight    ED PROVIDER: Jeffy Bhardwaj M.D.      Chief Complaint   Patient presents with    Fall    Back Pain         FINAL IMPRESSION:  1. Fall, initial encounter    2. Acute low back pain, unspecified back pain laterality, unspecified whether sciatica present    3. Acute right-sided thoracic back pain          ED COURSE & MEDICAL DECISION MAKIN:01 PM I met with the patient to obtain patient history and performed a physical exam. Discussed plan for ED work up including potential diagnostic studies and interventions.  9:51 PM Reevaluated and updated the patient with findings. Reviewed supportive cares, symptomatic treatment, outpatient follow up, and reasons to return to the Emergency Department. Patient to be discharged by ED RN.     32 year old female presents to the Emergency Department for evaluation of back pain in her lumbar and thoracic region after a fall.  She had a fall from standing, baseline has some gait instability and uses a walker, antalgic the fall was mechanical in nature.  She has a history complicated by chronic pain.  Also is reporting some numbness throughout her right leg which does not seem to follow a dermatome, sensation is present but subjectively diminished. Later reporting that \"my whole back is numb.\" This does not seem congruent with a spinal cord injury. On her problem list I can see that she has documented right leg paresthesias and right leg weakness on her chronic problem list, so unclear if all of this is directly related to today's trauma.  Patient received Tylenol and Zofran prehospital, was given some additional Flexeril here.  Strongly prefer to avoid opiate analgesia in this patient with a history of of secondary gain driving behaviors and frequent ED visits.  Had initially " ordered a lumbar CT however patient initially too uncomfortable when laying flat (also with care plan designed to minimize excessive CT radiation given numerous prior scans particularly of the abdomen and head, so proceeded with x-ray imaging of her chest, thoracic and lumbar spine.    X-rays were negative for acute fracture however within confines of some limitations due to body habitus.  Patient was laying on her side on recheck, appeared more comfortable than initial although was still reporting pain.  We discussed that options for analgesia would be somewhat limited by other care factors.  I do not think that benzodiazepines or opiates would be appropriate in this setting with negative imaging.  We discussed a short-term prescription for Flexeril and lidocaine patches and continuing Tylenol every 6 hours.  I think she is stable for discharge and continued monitoring at her facility. Transport was arranged.    Medical Decision Making    History:  Supplemental history from: Documented in chart, if applicable  External Record(s) reviewed: Documented in chart, if applicable.    Work Up:  Chart documentation includes differential considered and any EKGs or imaging independently interpreted by provider, where specified.  In additional to work up documented, I considered the following work up: Documented in chart, if applicable.    External consultation:  Discussion of management with another provider: Documented in chart, if applicable    Complicating factors:  Care impacted by chronic illness: Diabetes, Mental Health, and Other: Polyneuropathy  Care affected by social determinants of health: Access to Medical Care    Disposition considerations: Discharge. I prescribed additional prescription strength medication(s) as charted. See documentation for any additional details.        MEDICATIONS GIVEN IN THE EMERGENCY:  Medications   Lidocaine (LIDOCARE) 4 % Patch 1 patch (1 patch Transdermal $Patch/Med Applied 10/20/23  2118)   cyclobenzaprine (FLEXERIL) tablet 10 mg (10 mg Oral $Given 10/20/23 2011)       NEW PRESCRIPTIONS STARTED AT TODAY'S ER VISIT  Discharge Medication List as of 10/20/2023 10:08 PM        START taking these medications    Details   cyclobenzaprine (FLEXERIL) 10 MG tablet Take 1 tablet (10 mg) by mouth 3 times daily as needed for muscle spasms, Disp-20 tablet, R-0, Local Print      Lidocaine (LIDOCARE) 4 % Patch Place 1 patch onto the skin every 24 hours To prevent lidocaine toxicity, patient should be patch free for 12 hrs daily.Disp-10 patch, R-0Local Print                =================================================================    HPI    Patient information was obtained from: patient    Use of : N/A         Nevin Alvarado is a 32 year old female with a pertinent history of borderline personality disorder, anxiety, polyneuropathy, self-injurious behavior, DM2, morbid obesity, who presents to this ED via EMS for evaluation of fall.    Patient reports at 7 PM today, she was trying to stand up using the back rest of a chair when she suddenly slipped on the carpet and landed on her buttocks. Since then, she endorses persistent severe back pain, nausea, and right leg numbness and tingling. She says the back pain starts from her tailbone and radiates up to her neck (worse under the right lower shoulder). She called EMS and was given 500 mg of tylenol and 4 mg of Zofran en route. She uses a walker at baseline. There were no other concerns/complaints at this time.        REVIEW OF SYSTEMS   All systems reviewed and negative except as noted in HPI.    PAST MEDICAL HISTORY:  Past Medical History:   Diagnosis Date    ADD (attention deficit disorder)     Anorexia nervosa with bulimia (H28)     history of; on Topamax    Anxiety     Asthma     Borderline personality disorder (H)     Depression     Eating disorder     H/O self-harm     h/o Suicide attempt 02/21/2018    History of pulmonary embolism  12/2019    Provoked. Completed 3 month course of Apixaban    Morbid obesity     Neuropathy     Obesity     PTSD (post-traumatic stress disorder)     Pulmonary emboli (H)     Rectal foreign body - Recurrent issue, self placed     Self-injurious behavior     hx swallowing nonfood items such as mickie pins    Sleep apnea     uses cpap    Suicidal overdose (H)     Swallowed foreign body - Recurrent issue, self placed     Syncope        CURRENT MEDICATIONS:    Current Facility-Administered Medications   Medication    Lidocaine (LIDOCARE) 4 % Patch 1 patch     Current Outpatient Medications   Medication    cyclobenzaprine (FLEXERIL) 10 MG tablet    Lidocaine (LIDOCARE) 4 % Patch    albuterol (PROAIR HFA/PROVENTIL HFA/VENTOLIN HFA) 108 (90 Base) MCG/ACT inhaler    albuterol (PROVENTIL) (2.5 MG/3ML) 0.083% neb solution    Ayr Saline Nasal No-Drip GEL    BANOPHEN 2-0.1 % external cream    brexpiprazole (REXULTI) 1 MG tablet    cetirizine (ZYRTEC) 10 MG tablet    Cholecalciferol (D3 HIGH POTENCY) 25 MCG (1000 UT) CAPS    clonazePAM (KLONOPIN) 0.5 MG tablet    erythromycin (ROMYCIN) 5 MG/GM ophthalmic ointment    ferrous sulfate (FEROSUL) 325 (65 Fe) MG tablet    fluocinonide (LIDEX) 0.05 % external cream    furosemide (LASIX) 20 MG tablet    hydroxychloroquine (PLAQUENIL) 200 MG tablet    metFORMIN (GLUCOPHAGE XR) 500 MG 24 hr tablet    montelukast (SINGULAIR) 10 MG tablet    nabumetone (RELAFEN) 750 MG tablet    norethindrone (AYGESTIN) 5 MG tablet    Nutritional Supplements (ENSURE MAX PROTEIN) LIQD    omeprazole (PRILOSEC) 40 MG DR capsule    ondansetron (ZOFRAN-ODT) 4 MG ODT tab    pregabalin (LYRICA) 100 MG capsule    pregabalin (LYRICA) 100 MG capsule    Respiratory Therapy Supplies (NEBULIZER) BRENDAN    Semaglutide (RYBELSUS) 14 MG tablet    Semaglutide-Weight Management (WEGOVY) 0.5 MG/0.5ML pen    sodium chloride (OCEAN) 0.65 % nasal spray    SUMAtriptan (IMITREX) 25 MG tablet    valACYclovir (VALTREX) 1000 mg tablet          ALLERGIES:  Allergies   Allergen Reactions    Amoxicillin-Pot Clavulanate Other (See Comments), Swelling and Rash     PN: facial swelling, left side. Also had cortisone injection the same day as she started the Augmentin.  Noted in downtime recovery of chart.    PN: facial swelling, left side. Also had cortisone injection the same day as she started the Augmentin.; HUT Comment: PN: facial swelling, left side. Also had cortisone injection the same day as she started the Augmentin.Noted in downtime recovery of chart.; HUT Reaction: Rash; HUT Reaction: Unknown; HUT Reaction Type: Allergy; HUT Severity: Med; HUT Noted: [id number]  PN: facial swelling, left side. Also had cortisone injection the same day as she started the Augmentin.  Other reaction(s): *Unknown  PN: facial swelling, left side. Also had cortisone injection the same day as she started the Augmentin.  Noted in downtime recovery of chart.  PN: facial swelling, left side. Also had cortisone injection the same day as she started the Augmentin.  Other reaction(s): Facial swelling  Other reaction(s): Facial swelling    Hydrocodone Nausea and Vomiting and GI Disturbance     vomiting for days, PN: vomiting for days; HUT Comment: vomiting for days; HUT Reaction: Gastrointestinal; HUT Reaction: Nausea And Vomiting; HUT Reaction Type: Intolerance; HUT Severity: Med; HUT Noted: [id number]  vomiting for days    Other reaction(s): Rash    Hydrocodone-Acetaminophen Nausea and Vomiting and Rash     Update on 12/12  Pt says she can take tylenol just not the hydrocodone.   Other reaction(s): Rash      Influenza Vaccines Other (See Comments) and Nausea and Vomiting     HUT Reaction: Nausea And Vomiting; HUT Reaction Type: Intolerance; HUT Severity: Low; HUT Noted: [id number]    Latex Rash     HUT Reaction: Rash; HUT Reaction Type: Allergy; HUT Severity: Low; HUT Noted: [id number]  Other reaction(s): Rash      Oseltamivir Hives     med stopped, PN: med stopped  med stopped,  PN: med stopped; HUT Comment: med stopped, PN: med stopped; HUT Reaction: Hives; HUT Reaction Type: Allergy; HUT Severity: Med; HUT Noted: [id number]    Penicillins Anaphylaxis     HUT Reaction: Anaphylaxis; HUT Reaction Type: Allergy; HUT Severity: High; HUT Noted: 20150904    Vancomycin Itching, Swelling and Rash     Other reaction(s): Redness  Flushed face, very itchy; HUT Comment: Flushed face, very itchy; HUT Reaction: Angioedema; HUT Reaction: Redness; HUT Severity: Med; HUT Noted: [id number]    facial    Blood-Group Specific Substance Other (See Comments)     Patient has an anti-Cw and non-specific antibodies. Blood product orders may be delayed. Draw one red top and two purple top tubes for all type/screen/crossmatch orders.  Patient has anti-Cw, anti-K (Angella), Warm auto and nonspecific antibodies. Blood products may be delayed. Draw patient 24 hours prior to transfusion. Draw one red top and two purple top tubes for all type and screen orders.    Clavulanic Acid Angioedema    Fentanyl Itching    Haemophilus B Polysaccharide Vaccine Nausea and Vomiting    Naltrexone Other (See Comments)     Reaction(s): Vivid dreams.    Other Drug Allergy (See Comments)      See original file MRN [id number]. Files are marked for merge    Oxycodone Swelling    Adhesive Tape Rash     Silicone type  Silicone type    Other reaction(s): adhesive allergy  Other reaction(s): adhesive allergy  Silicone type    Other reaction(s): adhesive allergy      Band-Aid Anti-Itch      Other reaction(s): adhesive allergy    Cephalosporins Rash    Lamotrigine Rash     Possibly associated with Lamictal.   HUT Comment: Possibly associated with Lamictal. ; HUT Reaction: Rash; HUT Reaction Type: Allergy; HUT Severity: Low; HUT Noted: [id number]    No Clinical Screening - See Comments Rash and Other (See Comments)     Silicone type  Silicone type  See original file MRN [id number]. Files are marked for merge  History of swallowing sharp metallic objects.  "She should not be prescribed lancets due to posed risk of swallowing.        FAMILY HISTORY:  Family History   Problem Relation Age of Onset    Diabetes Type 2  Maternal Grandmother     Diabetes Type 2  Paternal Grandmother     Breast Cancer Paternal Grandmother     Cerebrovascular Disease Father 53    Myocardial Infarction No family hx of     Coronary Artery Disease Early Onset No family hx of     Ovarian Cancer No family hx of     Colon Cancer No family hx of     Depression Mother     Anxiety Disorder Mother        SOCIAL HISTORY:   Social History     Socioeconomic History    Marital status: Single     Spouse name: None    Number of children: None    Years of education: None    Highest education level: None   Occupational History    Occupation: On disability   Tobacco Use    Smoking status: Never    Smokeless tobacco: Never   Substance and Sexual Activity    Alcohol use: No     Alcohol/week: 0.0 standard drinks of alcohol    Drug use: No    Sexual activity: Not Currently     Partners: Male     Birth control/protection: I.U.D.     Comment: IUD - Mirena - placed July, 2015   Social History Narrative    Single.    Living in independent living portion of [employer] [facility].    On disability.    No regular exercise.        VITALS:  /75   Pulse 94   Temp 97.7  F (36.5  C) (Oral)   Resp 20   Ht 1.575 m (5' 2\")   Wt 127.9 kg (282 lb)   LMP 10/09/2023   SpO2 100%   BMI 51.58 kg/m        PHYSICAL EXAM    Constitutional: Obese middle-age female patient, sitting up in bed, uncomfortable appearing  HENT: Normocephalic, Atraumatic. Neck Supple.  Eyes: EOMI, Conjunctiva normal.  Respiratory: Breathing comfortably on room air. Speaks full sentences easily. Lungs clear to ascultation.  Cardiovascular: Normal heart rate, Regular rhythm. No peripheral edema.  Abdomen: Soft  Musculoskeletal: Good range of motion in all major joints. No major deformities noted.  Integument: Warm, Dry.  Neurologic: Awake, alert, " oriented.  Face is symmetric.  Speech is normal.  Strength is 5 out of 5 in bilateral upper extremities including  strength elbow flexion and extension, sensation seems symmetric throughout bilateral lower extremities including hip flexion knee extension although somewhat limited by pain as well as plantarflexion and dorsiflexion.  Sensation to light touch is intact but subjectively diminished through essentially the entire right lower extremity up to the proximal thigh.  Psychiatric: Cooperative. Affect appropriate.       RADIOLOGY:  Reviewed all pertinent imaging. Please see official radiology report.  XR Lumbar Spine 2/3 Views   Final Result   IMPRESSION:   Limited exam due to portable technique and patient body habitus. Within this limitation:      Thoracic spine: Limited visualization of the upper most thoracic levels on the lateral radiograph due to overlying anatomy. S-shaped scoliosis, dextroconvex at the upper to mid thoracic spine and levoconvex at the lower thoracic spine/thoracolumbar    junction. No spondylolisthesis. No acute displaced fracture or compression deformity. Mild multilevel interbody degenerative change, most notably at the mid thoracic levels. Low lung volumes with bibasilar atelectasis.      Lumbar spine: Straightening of the normal lordosis and mild dextroconvex rotatory scoliosis centered at the mid lumbar spine. No spondylolisthesis. No acute displaced fracture or compression deformity. Partially sacralized L5 with a likely diminutive    L5-S1 intervertebral disc. No significant degenerative change. Status post cholecystectomy.      XR Thoracic Spine 2 Views   Final Result   IMPRESSION:   Limited exam due to portable technique and patient body habitus. Within this limitation:      Thoracic spine: Limited visualization of the upper most thoracic levels on the lateral radiograph due to overlying anatomy. S-shaped scoliosis, dextroconvex at the upper to mid thoracic spine and  levoconvex at the lower thoracic spine/thoracolumbar    junction. No spondylolisthesis. No acute displaced fracture or compression deformity. Mild multilevel interbody degenerative change, most notably at the mid thoracic levels. Low lung volumes with bibasilar atelectasis.      Lumbar spine: Straightening of the normal lordosis and mild dextroconvex rotatory scoliosis centered at the mid lumbar spine. No spondylolisthesis. No acute displaced fracture or compression deformity. Partially sacralized L5 with a likely diminutive    L5-S1 intervertebral disc. No significant degenerative change. Status post cholecystectomy.      XR Chest Port 1 View   Final Result   IMPRESSION: Shallow inspiration accentuates heart size and pulmonary vascularity and are likely within normal limits. No infiltrates. Visualized bones unremarkable.          EKG:    None    PROCEDURES:   None      I, Trish Gonzales, am serving as a scribe to document services personally performed by Dr. Jeffy Bhardwaj, based on my observation and the provider's statements to me. I, Jeffy Bhardwaj MD attest that Trish Gonzales is acting in a scribe capacity, has observed my performance of the services and has documented them in accordance with my direction.    Jeffy Bhardwaj M.D.  Emergency Medicine  [facility] EMERGENCY ROOM  [address] [facility] [address] [phone]  Dept: [phone]       Jeffy Bhardwaj MD  10/20/23 2247       Jeffy Bhardwaj MD  10/20/23 2243

## 2023-10-21 NOTE — ED TRIAGE NOTES
Arrival by EMS. Reports fall @ 7 pm. C/o low back/shoulder pain. Given tylenol 500 mg and zofran 4 mg in route. From group home.      Triage Assessment (Adult)       Row Name 10/20/23 1953          Triage Assessment    Airway WDL WDL        Respiratory WDL    Respiratory WDL WDL        Skin Circulation/Temperature WDL    Skin Circulation/Temperature WDL WDL        Cardiac WDL    Cardiac WDL WDL        Cognitive/Neuro/Behavioral WDL    Cognitive/Neuro/Behavioral WDL WDL

## 2023-10-24 ENCOUNTER — TELEPHONE (OUTPATIENT)
Dept: GASTROENTEROLOGY | Facility: CLINIC | Age: 33
End: 2023-10-24
Payer: COMMERCIAL

## 2023-10-24 NOTE — TELEPHONE ENCOUNTER
[facility] Call Center    Phone Message    May a detailed message be left on voicemail: yes     Reason for Call: Other: Pt called to follow up on a request  for a Zofran prescription. Pt stated that they have been experiencing abdominal pain in the upper left quadrant. They also stated they have been  experiencing nausea, and has been vomiting for 2 days and at one point while at [employer] 911 was called on their behalf. Pt is requesting a call back ASAP to discuss their symptoms and prescription.      Action Taken: Message routed to:  [facility]    Travel Screening: Not Applicable

## 2023-10-24 NOTE — TELEPHONE ENCOUNTER
[facility] Call Center    Phone Message    May a detailed message be left on voicemail: yes     Reason for Call: Other: Patient called and requested a RX for Zofran, for nausea.  She keeps getting a RX thru Urgent Care and ER but, they .  Patient would like a follow up from Carli, via [facility].      Action Taken: Message routed to:  [facility]    Travel Screening: Not Applicable

## 2023-10-25 DIAGNOSIS — T18.9XXS SWALLOWED FOREIGN BODY, SEQUELA: Primary | ICD-10-CM

## 2023-10-25 RX ORDER — ONDANSETRON 4 MG/1
4 TABLET, ORALLY DISINTEGRATING ORAL EVERY 8 HOURS PRN
Qty: 10 TABLET | Refills: 0 | Status: SHIPPED | OUTPATIENT
Start: 2023-10-25 | End: 2023-11-04

## 2023-10-25 NOTE — TELEPHONE ENCOUNTER
"Attempted to contact pt through home phone number and phone number reported \"cannot be completed at this time\". Left message for pt though cell number discussing zofran prescription sent and looking to schedule pt for follow up appointment with Carli CASTANO in one of the held spots on Mondays at 1:00 pm  "

## 2023-10-26 NOTE — TELEPHONE ENCOUNTER
Spoke with pt regarding scheduling follow up appointment with Carli JENSEN Pt currently has an appointment on Monday and would not be able to make a 1:00 pm start time.    Pt did report she should have some time on Wednesday or Friday next week if that is available.     Pt requested a call back after 12:00 today as pt has appointments until 12:00.

## 2023-10-26 NOTE — TELEPHONE ENCOUNTER
Spoke with pt and offered 2:00 pm virtual appointment with Carli Potter. This time works for the pt. Requested schedulers to add pt onto Carli's schedule

## 2023-10-28 ENCOUNTER — HOSPITAL ENCOUNTER (EMERGENCY)
Facility: CLINIC | Age: 33
Discharge: HOME | End: 2023-10-28
Attending: EMERGENCY MEDICINE | Admitting: EMERGENCY MEDICINE
Payer: COMMERCIAL

## 2023-10-28 ENCOUNTER — APPOINTMENT (OUTPATIENT)
Dept: ULTRASOUND IMAGING | Facility: CLINIC | Age: 33
End: 2023-10-28
Attending: EMERGENCY MEDICINE
Payer: COMMERCIAL

## 2023-10-28 ENCOUNTER — APPOINTMENT (OUTPATIENT)
Dept: GENERAL RADIOLOGY | Facility: CLINIC | Age: 33
End: 2023-10-28
Attending: EMERGENCY MEDICINE
Payer: COMMERCIAL

## 2023-10-28 VITALS
SYSTOLIC BLOOD PRESSURE: 142 MMHG | WEIGHT: 282 LBS | HEIGHT: 62 IN | HEART RATE: 94 BPM | BODY MASS INDEX: 51.89 KG/M2 | TEMPERATURE: 98 F | OXYGEN SATURATION: 97 % | DIASTOLIC BLOOD PRESSURE: 88 MMHG | RESPIRATION RATE: 20 BRPM

## 2023-10-28 DIAGNOSIS — S93.402A SPRAIN OF LEFT ANKLE, UNSPECIFIED LIGAMENT, INITIAL ENCOUNTER: ICD-10-CM

## 2023-10-28 PROCEDURE — 73610 X-RAY EXAM OF ANKLE: CPT | Mod: LT

## 2023-10-28 PROCEDURE — 99284 EMERGENCY DEPT VISIT MOD MDM: CPT | Mod: 25

## 2023-10-28 PROCEDURE — 73630 X-RAY EXAM OF FOOT: CPT | Mod: LT

## 2023-10-28 PROCEDURE — 93971 EXTREMITY STUDY: CPT | Mod: LT

## 2023-10-28 PROCEDURE — 250N000013 HC RX MED GY IP 250 OP 250 PS 637: Performed by: EMERGENCY MEDICINE

## 2023-10-28 RX ORDER — ACETAMINOPHEN 325 MG/1
650 TABLET ORAL ONCE
Status: COMPLETED | OUTPATIENT
Start: 2023-10-28 | End: 2023-10-28

## 2023-10-28 RX ADMIN — ACETAMINOPHEN 650 MG: 325 TABLET, FILM COATED ORAL at 22:45

## 2023-10-28 ASSESSMENT — ACTIVITIES OF DAILY LIVING (ADL): ADLS_ACUITY_SCORE: 40

## 2023-10-29 ENCOUNTER — APPOINTMENT (OUTPATIENT)
Dept: GENERAL RADIOLOGY | Facility: CLINIC | Age: 33
End: 2023-10-29
Attending: EMERGENCY MEDICINE
Payer: COMMERCIAL

## 2023-10-29 ENCOUNTER — APPOINTMENT (OUTPATIENT)
Dept: CT IMAGING | Facility: CLINIC | Age: 33
End: 2023-10-29
Attending: EMERGENCY MEDICINE
Payer: COMMERCIAL

## 2023-10-29 ENCOUNTER — HOSPITAL ENCOUNTER (EMERGENCY)
Facility: CLINIC | Age: 33
Discharge: NURSING, REHAB OR LONG-TERM CARE | End: 2023-10-29
Attending: EMERGENCY MEDICINE | Admitting: EMERGENCY MEDICINE
Payer: COMMERCIAL

## 2023-10-29 VITALS
OXYGEN SATURATION: 95 % | HEART RATE: 74 BPM | DIASTOLIC BLOOD PRESSURE: 66 MMHG | SYSTOLIC BLOOD PRESSURE: 124 MMHG | BODY MASS INDEX: 51.89 KG/M2 | WEIGHT: 282 LBS | TEMPERATURE: 97.8 F | RESPIRATION RATE: 14 BRPM | HEIGHT: 62 IN

## 2023-10-29 DIAGNOSIS — T18.9XXA SWALLOWED FOREIGN BODY, INITIAL ENCOUNTER: ICD-10-CM

## 2023-10-29 LAB
ANION GAP SERPL CALCULATED.3IONS-SCNC: 15 MMOL/L (ref 7–15)
ATRIAL RATE - MUSE: 87 BPM
BASOPHILS # BLD AUTO: 0 10E3/UL (ref 0–0.2)
BASOPHILS NFR BLD AUTO: 0 %
BUN SERPL-MCNC: 9.1 MG/DL (ref 6–20)
CALCIUM SERPL-MCNC: 9.5 MG/DL (ref 8.6–10)
CHLORIDE SERPL-SCNC: 102 MMOL/L (ref 98–107)
CREAT SERPL-MCNC: 0.8 MG/DL (ref 0.51–0.95)
DEPRECATED HCO3 PLAS-SCNC: 24 MMOL/L (ref 22–29)
DIASTOLIC BLOOD PRESSURE - MUSE: NORMAL MMHG
EGFRCR SERPLBLD CKD-EPI 2021: >90 ML/MIN/1.73M2
EOSINOPHIL # BLD AUTO: 0.1 10E3/UL (ref 0–0.7)
EOSINOPHIL NFR BLD AUTO: 1 %
ERYTHROCYTE [DISTWIDTH] IN BLOOD BY AUTOMATED COUNT: 13.4 % (ref 10–15)
GLUCOSE SERPL-MCNC: 100 MG/DL (ref 70–99)
HCG SERPL QL: NEGATIVE
HCT VFR BLD AUTO: 41.9 % (ref 35–47)
HGB BLD-MCNC: 14 G/DL (ref 11.7–15.7)
IMM GRANULOCYTES # BLD: 0 10E3/UL
IMM GRANULOCYTES NFR BLD: 0 %
INTERPRETATION ECG - MUSE: NORMAL
LYMPHOCYTES # BLD AUTO: 1.5 10E3/UL (ref 0.8–5.3)
LYMPHOCYTES NFR BLD AUTO: 19 %
MCH RBC QN AUTO: 30 PG (ref 26.5–33)
MCHC RBC AUTO-ENTMCNC: 33.4 G/DL (ref 31.5–36.5)
MCV RBC AUTO: 90 FL (ref 78–100)
MONOCYTES # BLD AUTO: 0.5 10E3/UL (ref 0–1.3)
MONOCYTES NFR BLD AUTO: 6 %
NEUTROPHILS # BLD AUTO: 5.9 10E3/UL (ref 1.6–8.3)
NEUTROPHILS NFR BLD AUTO: 74 %
NRBC # BLD AUTO: 0 10E3/UL
NRBC BLD AUTO-RTO: 0 /100
P AXIS - MUSE: 39 DEGREES
PLATELET # BLD AUTO: 273 10E3/UL (ref 150–450)
POTASSIUM SERPL-SCNC: 4 MMOL/L (ref 3.4–5.3)
PR INTERVAL - MUSE: 160 MS
QRS DURATION - MUSE: 76 MS
QT - MUSE: 370 MS
QTC - MUSE: 445 MS
R AXIS - MUSE: 95 DEGREES
RBC # BLD AUTO: 4.66 10E6/UL (ref 3.8–5.2)
SODIUM SERPL-SCNC: 141 MMOL/L (ref 135–145)
SYSTOLIC BLOOD PRESSURE - MUSE: NORMAL MMHG
T AXIS - MUSE: 15 DEGREES
UPPER GI ENDOSCOPY: NORMAL
VENTRICULAR RATE- MUSE: 87 BPM
WBC # BLD AUTO: 8 10E3/UL (ref 4–11)

## 2023-10-29 PROCEDURE — 96375 TX/PRO/DX INJ NEW DRUG ADDON: CPT

## 2023-10-29 PROCEDURE — 250N000011 HC RX IP 250 OP 636: Performed by: EMERGENCY MEDICINE

## 2023-10-29 PROCEDURE — G0500 MOD SEDAT ENDO SERVICE >5YRS: HCPCS | Performed by: INTERNAL MEDICINE

## 2023-10-29 PROCEDURE — 36415 COLL VENOUS BLD VENIPUNCTURE: CPT | Performed by: EMERGENCY MEDICINE

## 2023-10-29 PROCEDURE — 250N000013 HC RX MED GY IP 250 OP 250 PS 637: Performed by: EMERGENCY MEDICINE

## 2023-10-29 PROCEDURE — 85025 COMPLETE CBC W/AUTO DIFF WBC: CPT | Performed by: EMERGENCY MEDICINE

## 2023-10-29 PROCEDURE — 84703 CHORIONIC GONADOTROPIN ASSAY: CPT | Performed by: EMERGENCY MEDICINE

## 2023-10-29 PROCEDURE — 74177 CT ABD & PELVIS W/CONTRAST: CPT

## 2023-10-29 PROCEDURE — 93005 ELECTROCARDIOGRAM TRACING: CPT

## 2023-10-29 PROCEDURE — 71046 X-RAY EXAM CHEST 2 VIEWS: CPT

## 2023-10-29 PROCEDURE — 99285 EMERGENCY DEPT VISIT HI MDM: CPT | Mod: 25

## 2023-10-29 PROCEDURE — 43247 EGD REMOVE FOREIGN BODY: CPT | Performed by: INTERNAL MEDICINE

## 2023-10-29 PROCEDURE — 250N000011 HC RX IP 250 OP 636: Performed by: INTERNAL MEDICINE

## 2023-10-29 PROCEDURE — 96376 TX/PRO/DX INJ SAME DRUG ADON: CPT

## 2023-10-29 PROCEDURE — 96374 THER/PROPH/DIAG INJ IV PUSH: CPT | Mod: 59

## 2023-10-29 PROCEDURE — 74018 RADEX ABDOMEN 1 VIEW: CPT

## 2023-10-29 PROCEDURE — 80048 BASIC METABOLIC PNL TOTAL CA: CPT | Performed by: EMERGENCY MEDICINE

## 2023-10-29 PROCEDURE — 250N000009 HC RX 250: Performed by: EMERGENCY MEDICINE

## 2023-10-29 RX ORDER — IOPAMIDOL 755 MG/ML
135 INJECTION, SOLUTION INTRAVASCULAR ONCE
Status: COMPLETED | OUTPATIENT
Start: 2023-10-29 | End: 2023-10-29

## 2023-10-29 RX ORDER — FLUMAZENIL 0.1 MG/ML
0.2 INJECTION, SOLUTION INTRAVENOUS
Status: DISCONTINUED | OUTPATIENT
Start: 2023-10-29 | End: 2023-10-30 | Stop reason: HOSPADM

## 2023-10-29 RX ORDER — LIDOCAINE 40 MG/G
CREAM TOPICAL
Status: CANCELLED | OUTPATIENT
Start: 2023-10-29

## 2023-10-29 RX ORDER — MORPHINE SULFATE 4 MG/ML
4 INJECTION, SOLUTION INTRAMUSCULAR; INTRAVENOUS EVERY 30 MIN PRN
Status: DISCONTINUED | OUTPATIENT
Start: 2023-10-29 | End: 2023-10-30 | Stop reason: HOSPADM

## 2023-10-29 RX ORDER — EPINEPHRINE 1 MG/ML
0.1 INJECTION, SOLUTION, CONCENTRATE INTRAVENOUS
Status: DISCONTINUED | OUTPATIENT
Start: 2023-10-29 | End: 2023-10-30 | Stop reason: HOSPADM

## 2023-10-29 RX ORDER — NALOXONE HYDROCHLORIDE 0.4 MG/ML
0.2 INJECTION, SOLUTION INTRAMUSCULAR; INTRAVENOUS; SUBCUTANEOUS
Status: DISCONTINUED | OUTPATIENT
Start: 2023-10-29 | End: 2023-10-30 | Stop reason: HOSPADM

## 2023-10-29 RX ORDER — ATROPINE SULFATE 0.1 MG/ML
1 INJECTION INTRAVENOUS
Status: DISCONTINUED | OUTPATIENT
Start: 2023-10-29 | End: 2023-10-30 | Stop reason: HOSPADM

## 2023-10-29 RX ORDER — DIPHENHYDRAMINE HCL 25 MG
50 CAPSULE ORAL ONCE
Status: COMPLETED | OUTPATIENT
Start: 2023-10-29 | End: 2023-10-29

## 2023-10-29 RX ORDER — SIMETHICONE 40MG/0.6ML
133 SUSPENSION, DROPS(FINAL DOSAGE FORM)(ML) ORAL
Status: DISCONTINUED | OUTPATIENT
Start: 2023-10-29 | End: 2023-10-30 | Stop reason: HOSPADM

## 2023-10-29 RX ORDER — DIPHENHYDRAMINE HYDROCHLORIDE 50 MG/ML
25-50 INJECTION INTRAMUSCULAR; INTRAVENOUS
Status: COMPLETED | OUTPATIENT
Start: 2023-10-29 | End: 2023-10-29

## 2023-10-29 RX ORDER — FENTANYL CITRATE 50 UG/ML
50-100 INJECTION, SOLUTION INTRAMUSCULAR; INTRAVENOUS EVERY 5 MIN PRN
Status: DISCONTINUED | OUTPATIENT
Start: 2023-10-29 | End: 2023-10-30 | Stop reason: HOSPADM

## 2023-10-29 RX ORDER — ONDANSETRON 2 MG/ML
4 INJECTION INTRAMUSCULAR; INTRAVENOUS
Status: CANCELLED | OUTPATIENT
Start: 2023-10-29

## 2023-10-29 RX ORDER — NALOXONE HYDROCHLORIDE 0.4 MG/ML
0.4 INJECTION, SOLUTION INTRAMUSCULAR; INTRAVENOUS; SUBCUTANEOUS
Status: DISCONTINUED | OUTPATIENT
Start: 2023-10-29 | End: 2023-10-30 | Stop reason: HOSPADM

## 2023-10-29 RX ORDER — ONDANSETRON 2 MG/ML
4 INJECTION INTRAMUSCULAR; INTRAVENOUS ONCE
Status: COMPLETED | OUTPATIENT
Start: 2023-10-29 | End: 2023-10-29

## 2023-10-29 RX ADMIN — MIDAZOLAM 2 MG: 1 INJECTION INTRAMUSCULAR; INTRAVENOUS at 19:52

## 2023-10-29 RX ADMIN — MIDAZOLAM 2 MG: 1 INJECTION INTRAMUSCULAR; INTRAVENOUS at 19:49

## 2023-10-29 RX ADMIN — SODIUM CHLORIDE 80 ML: 9 INJECTION, SOLUTION INTRAVENOUS at 18:53

## 2023-10-29 RX ADMIN — MORPHINE SULFATE 4 MG: 4 INJECTION, SOLUTION INTRAMUSCULAR; INTRAVENOUS at 17:58

## 2023-10-29 RX ADMIN — FENTANYL CITRATE 100 MCG: 50 INJECTION, SOLUTION INTRAMUSCULAR; INTRAVENOUS at 19:49

## 2023-10-29 RX ADMIN — IOPAMIDOL 135 ML: 755 INJECTION, SOLUTION INTRAVENOUS at 18:53

## 2023-10-29 RX ADMIN — ONDANSETRON 4 MG: 2 INJECTION INTRAMUSCULAR; INTRAVENOUS at 18:28

## 2023-10-29 RX ADMIN — DIPHENHYDRAMINE HYDROCHLORIDE 25 MG: 50 INJECTION, SOLUTION INTRAMUSCULAR; INTRAVENOUS at 19:49

## 2023-10-29 RX ADMIN — ONDANSETRON 4 MG: 2 INJECTION INTRAMUSCULAR; INTRAVENOUS at 19:46

## 2023-10-29 RX ADMIN — DIPHENHYDRAMINE HYDROCHLORIDE 50 MG: 25 CAPSULE ORAL at 21:30

## 2023-10-29 ASSESSMENT — ACTIVITIES OF DAILY LIVING (ADL)
ADLS_ACUITY_SCORE: 40

## 2023-10-29 NOTE — ED TRIAGE NOTES
Pt BIBA from group home. Has hx of swallowing metal objects. Talked to GI dr a few weeks ago and they talked about doing an endoscopy to stretch the strictures and this exacerbated the thoughts and so today she swallowed the bow (one side) of eyeglasses around 1400 and is now having upper back pain. VSS. No trouble breathing.

## 2023-10-29 NOTE — ED PROVIDER NOTES
"    History     Chief Complaint:  Leg Pain (Left lower leg pain started yesterday. Hx of blood clot in her lung in 2020. )       HPI   Nevin Alvarado is a 32 year old female with a history of Borderline personality disorder, Anxiety nervosa with bulimia, and Obesity who presents to the ED today due to left ankle pain that radiates up her legs to the mid shin. She notes that her pain began yesterday after waking up and got worse when she began walking. She disclosed that she had a fall a month ago and she thinks she may have fractured her foot but she was unsure of what part of her foot was fractured. During the exam the patient displayed no focal pain to palpitations of the foot.       Independent Historian:    The patient      Review of External Notes:  Patient has an emergency care plan on file that was reviewed.  Patient frequently has foreign bodies, she has no concern for this at this time nor is or any evidence of puncture wound to suggest foreign body in the skin.      Medications:    Lidocare  Zyrtec  Klonopin   Zofran   Lasix   Singulair   Ferosul   Flexeril   Lyrica  Ocean  Imitrex  Valtrex   Aygestin   Rybelsus   Prilosec    Relafen    Past Medical History:    ADD  Anorexia nervosa with bulimia   Anxiety   Asthma  Borderline personality disorder   Depression   Eating disorder   Morbid obesity  Suicide attempt   Neuropathy   Obesity   PTSD  Neuropathy   Pulmonary embolism   Sleep apnea   Suicidal overdose  Syncope   Self injurious behavior   Suicide attempt   Self arm     Past Surgical History:    Abdomen surgery   Esophagoscopy   Gastroscopy   Duodenoscopy  Knee surgery   Sigmoidoscopy flexible     Physical Exam   Patient Vitals for the past 24 hrs:   BP Temp Temp src Pulse Resp SpO2 Height Weight   10/28/23 2059 (!) 142/88 98  F (36.7  C) Oral 94 20 97 % 1.575 m (5' 2\") 127.9 kg (282 lb)        Physical Exam  General:  Sitting on bed, comfortable appearing.   HENT:  No obvious trauma to " head  Right Ear:  External ear normal.   Left Ear:  External ear normal.   Nose:  Nose normal.   Eyes:  Conjunctivae and EOM are normal.  Neck: Normal range of motion. Neck supple. No tracheal deviation present.   Pulm/Chest: No respiratory distress  M/S: Normal range of motion. Mild pain diffuse to the lateral malleoli and dorsal foot. Small bruise to the dorsal foot just proximal 2nd and 3rd toe. No other tenderness to left ankle over bilateral malleoli, 5th metatarsal, navicular, proximal fibula, or elsewhere on foot or heel. No erythema or warmth to joint. No abrasion or skin lesion. No pain over distal achilles or deformity over distal achilles. Compartments soft. No calf pain or swelling. DP pulses +2. No swelling. Cap refill <2 seconds.  Neuro: Alert. GCS 15.  Skin: Skin is warm and dry. No rash noted. Not diaphoretic.   Psych: Normal mood and affect. Behavior is normal.       Emergency Department Course     Imaging:  Foot XR, G/E 3 views, left   Final Result   IMPRESSION: There is no acute fracture or dislocation. No interval change.      XR Ankle Left G/E 3 Views   Final Result   IMPRESSION: There is no acute fracture or dislocation. No interval change.      US Lower Extremity Venous Duplex Left   Final Result   IMPRESSION:   1.  No deep venous thrombosis in the left lower extremity.        Report per radiology    Emergency Department Course & Assessments:  Interventions:  Medications   acetaminophen (TYLENOL) tablet 650 mg (650 mg Oral $Given 10/28/23 5238)        Assessments:  2220 I obtained history and examined the patient as noted above.    2303 At this point I feel that the patient is safe for discharge, and the patient agrees.      Independent Interpretation (X-rays, CTs, rhythm strip):  Left foot and ankle shows no fracture.     Consultations/Discussion of Management or Tests:  None    Social Determinants of Health affecting care:  None     Disposition:  The patient was discharged to home.      Impression & Plan    CMS Diagnoses: None    Medical Decision Making:  Nevin Alvarado is a very pleasant 32 year old female who presents for evaluation of ankle pain.  She has a history of DVT and was worried about this.  Ultrasound confirms no DVT.  She does report a injury a few weeks ago.  X-rays were obtained that are negative.  Signs and symptoms are consistent with an ankle sprain.  No signs of septic arthritis, gout, pseudogout, fracture, cellulitis, etc.  There are no signs of fracture.  The patients neurovascular status is normal.  No other injury associated with this.  There is no erythema or warmth to suggest cellulitis.  Plan is for protected weightbearing, RICE treatment with ice 15 minutes on, 1 hour off, ace wrap.  Patient will advance weightbearing and follow-up with primary in 2-3 days.  They will begin gentle ROM exercises of the ankle including PF,DF, alphabet exercises.      The treatment plan was discussed with the patient and they expressed understanding of this plan and consented to the plan.  In addition, the patient will return to the emergency department if their symptoms persist, worsen, if new symptoms arise or if there is any concern as other pathology may be present that is not evident at this time. They also understand the importance of close follow up in the clinic and if unable to do so will return to the emergency department for a reevaluation. All questions were answered.    Diagnosis:    ICD-10-CM    1. Sprain of left ankle, unspecified ligament, initial encounter  S93.402A            Discharge Medications:  New Prescriptions    No medications on file          Scribe Disclosure:    Giorgio MURRAY, am serving as a scribe at 10:10 PM on 10/28/2023 to document services personally performed by Estrada Simeon DO based on my observations and the provider's statements to me.  10/28/2023   Estrada Simeon DO Anderson, Robert James, DO  10/28/23  3709

## 2023-10-29 NOTE — ED TRIAGE NOTES
Left lower leg pain that started yesterday. Hx of blood clot in her lung in 2020. Also concerned about redness at ankle over the last couple of days. Xray done about 1 month ago shows fractured toes but unable to determine acute/chronic.     Triage Assessment (Adult)       Row Name 10/28/23 2100          Triage Assessment    Airway WDL WDL        Respiratory WDL    Respiratory WDL WDL        Skin Circulation/Temperature WDL    Skin Circulation/Temperature WDL WDL        Peripheral/Neurovascular WDL    Peripheral Neurovascular WDL WDL        Cognitive/Neuro/Behavioral WDL    Cognitive/Neuro/Behavioral WDL WDL        [place] Coma Scale    Best Eye Response 4-->(E4) spontaneous     Best Motor Response 6-->(M6) obeys commands     Best Verbal Response 5-->(V5) oriented     [place] Coma Scale Score 15

## 2023-10-29 NOTE — ED NOTES
Acute Care - Wound/Debridement Treatment Note  [facility]     Patient Name: Zoila Nava  : 1932  MRN: [id number]  Today's Date: 10/29/2023                Admit Date: 10/24/2023    Visit Dx:    ICD-10-CM ICD-9-CM   1. Altered mental status, unspecified altered mental status type  R41.82 780.97   2. Hypotension, unspecified hypotension type  I95.9 458.9   3. Dysphagia, unspecified type  R13.10 787.20       Patient Active Problem List   Diagnosis    Allergic rhinitis    Hyperlipidemia    Hypertension    Hypocalcemia    Hypothyroidism    Neuropathy    NUD (nonulcer dyspepsia)    Painful knee    Pernicious anemia    Tremor    Vitamin B12 deficiency    Spondylolisthesis of cervical region    Graves' ophthalmopathy    Anemia    Memory impairment of gradual onset    Ascending aortic aneurysm    Stage 3 chronic kidney disease    Pulmonary hypertension    Chronic heart failure with preserved ejection fraction    CHF (congestive heart failure), NYHA class I, acute on chronic, diastolic    CHF (congestive heart failure)    Non-rheumatic aortic stenosis    Non-rheumatic aortic regurgitation    Hypokalemia    Carpal tunnel syndrome    Essential tremor    Gastroesophageal reflux disease    Nausea and vomiting    Skin sensation disturbance    Spinal cord disease    Urge incontinence of urine    Urinary urgency    Acute chest pain    (HFpEF) heart failure with preserved ejection fraction    Nocturnal hypoxia    Confusion    Concussion without loss of consciousness    Hypotension    AMS (altered mental status)    Multiple open wounds of lower extremity, unspecified laterality, subsequent encounter        Past Medical History:   Diagnosis Date    Anemia     Arthritis     Asthma     Cataract     Difficulty breathing     Chronic    GERD (gastroesophageal reflux disease)     Graves' ophthalmopathy     Hoarseness     Hypertension     Hypertensive heart disease with acute on chronic diastolic congestive heart failure  Unable to get blood work, called lab to get blood work on pt.    10/11/2021    Pulmonary hypertension 10/11/2021    Spondylolisthesis of cervical region     Vitamin B12 deficiency         Past Surgical History:   Procedure Laterality Date    CERVICAL LAMINECTOMY      CHOLECYSTECTOMY      OTHER SURGICAL HISTORY Right     Neuroplasty Median Nerve at Carpal Tunnel    THYROID SURGERY      TOTAL KNEE ARTHROPLASTY Left 09/29/2014    Dr Colon           Wound 10/24/23 2000 Right medial leg (Active)   Dressing Appearance intact 10/29/23 1045   Closure Unable to assess 10/29/23 1200   Base moist;pink 10/29/23 1045   Periwound intact;redness;swelling;warm;edematous 10/29/23 1045   Periwound Temperature warm 10/29/23 1045   Periwound Skin Turgor soft 10/29/23 1045   Edges open 10/29/23 1045   Drainage Characteristics/Odor serosanguineous 10/29/23 1045   Drainage Amount none 10/29/23 1200   Care, Wound cleansed with;soap and water 10/29/23 1045   Dressing Care foam;low-adherent;silver impregnated 10/29/23 1045   Periwound Care cleansed with pH balanced cleanser 10/29/23 1045       Wound 10/26/23 1930 Left lower leg (Active)   Dressing Appearance dry;intact 10/29/23 0430   Closure Unable to assess 10/29/23 1200   Drainage Amount none 10/29/23 1200      Lymphedema       Row Name 10/29/23 1045             Lymphedema Edema Assessment    Ptting Edema Category By severity  -      Pitting Edema Mild  -MF         Compression/Skin Care    Compression/Skin Care skin care;wrapping location;bandaging  -      Skin Care washed/dried;lotion applied  -MF      Wrapping Location lower extremity  -MF      Wrapping Location LE bilateral:;foot to knee  -      Wrapping Comments size 4/5 compressogrip doubled and overlapping for gradient compression.  -                User Key  (r) = Recorded By, (t) = Taken By, (c) = Cosigned By      Initials Name Provider Type    Basil Pritchett, PT Physical Therapist                    WOUND DEBRIDEMENT                     PT Assessment (last 12 hours)        PT Evaluation and Treatment       Row Name 10/29/23 1045          Physical Therapy Time and Intention    Subjective Information no complaints  -     Document Type therapy note (daily note);wound care  -     Mode of Treatment individual therapy;physical therapy  -       Row Name 10/29/23 1045          Pain    Pretreatment Pain Rating 0/10 - no pain  -     Posttreatment Pain Rating 0/10 - no pain  -       Row Name 10/29/23 1045          Wound 10/24/23 2000 Right medial leg    Wound - Properties Group Placement Date: 10/24/23  -KG Placement Time: 2000  -KG Present on Original Admission: Y  -KG Side: Right  -KG Orientation: medial  -KG Location: leg  -KG    Dressing Appearance intact  -MF     Base moist;pink  -MF     Periwound intact;redness;swelling;warm;edematous  -     Periwound Temperature warm  -     Periwound Skin Turgor soft  -MF     Edges open  -MF     Drainage Characteristics/Odor serosanguineous  -MF     Drainage Amount scant  -MF     Care, Wound cleansed with;soap and water  -MF     Dressing Care foam;low-adherent;silver impregnated  -     Periwound Care cleansed with pH balanced cleanser  -     Retired Wound - Properties Group Placement Date: 10/24/23  -KG Placement Time: 2000 -KG Present on Original Admission: Y  -KG Side: Right  -KG Orientation: medial  -KG Location: leg  -KG    Retired Wound - Properties Group Date first assessed: 10/24/23  -KG Time first assessed: 2000 -KG Present on Original Admission: Y  -KG Side: Right  -KG Location: leg  -KG      Row Name             Wound 10/26/23 1930 Left lower leg    Wound - Properties Group Placement Date: 10/26/23  -KO Placement Time: 1930 -KO Side: Left  -KO Orientation: lower  -KO Location: leg  -KO Additional Comments: red and edematous; unnaboot placed by wound care 10/25/23  -KO    Retired Wound - Properties Group Placement Date: 10/26/23  -KO Placement Time: 1930 -KO Side: Left  -KO Orientation: lower  -KO Location: leg  -KO  Additional Comments: red and edematous; unnaboot placed by wound care 10/25/23  -KO    Retired Wound - Properties Group Date first assessed: 10/26/23  -KO Time first assessed: 1930  -KO Side: Left  -KO Location: leg  -KO Additional Comments: red and edematous; unnaboot placed by wound care 10/25/23  -KO      Row Name 10/29/23 1045          Coping    Observed Emotional State calm;cooperative;pleasant  -MF     Verbalized Emotional State acceptance  -MF     Trust Relationship/Rapport care explained  -MF       Row Name 10/29/23 1045          Plan of Care Review    Plan of Care Reviewed With patient  -MF     Outcome Evaluation BLE edema improving.  PT converted pt to size 4/5 compressogrip doubled and overlapping for gradient compression, to help further reduce LE edema and improve skin integrity. PT will f/u with compression wrapping change in 2-3 days.  -               User Key  (r) = Recorded By, (t) = Taken By, (c) = Cosigned By      Initials Name Provider Type    Basil Pritchett, PT Physical Therapist    Parth Lopez, RN Registered Nurse    Lorrie Faria RN Registered Nurse                  Physical Therapy Education       Title: PT OT SLP Therapies (In Progress)       Topic: Physical Therapy (Done)       Point: Mobility training (Done)       Learning Progress Summary             Patient Acceptance, E, VU,NR by CD at 10/25/2023 1523    Comment: BENEFITS OF OOB ACTIVITY, SAFETY WITH MOBILITY, PROGRESSION OF POC, D/C PLANNING,                         Point: Home exercise program (Done)       Learning Progress Summary             Patient Acceptance, E, VU,NR by CD at 10/25/2023 1523    Comment: BENEFITS OF OOB ACTIVITY, SAFETY WITH MOBILITY, PROGRESSION OF POC, D/C PLANNING,                         Point: Body mechanics (Done)       Learning Progress Summary             Patient Acceptance, E, VU,NR by CD at 10/25/2023 1523    Comment: BENEFITS OF OOB ACTIVITY, SAFETY WITH MOBILITY, PROGRESSION OF POC,  D/C PLANNING,                         Point: Precautions (Done)       Learning Progress Summary             Patient Acceptance, E, VU,NR by CD at 10/25/2023 1523    Comment: BENEFITS OF OOB ACTIVITY, SAFETY WITH MOBILITY, PROGRESSION OF POC, D/C PLANNING,                                         User Key       Initials Effective Dates Name Provider Type Discipline     02/03/23 -  Sheila Panda, PT Physical Therapist PT                    Recommendation and Plan  Anticipated Discharge Disposition (PT): inpatient rehabilitation facility  Planned Therapy Interventions (PT): wound care, patient/family education  Therapy Frequency (PT): daily  Plan of Care Reviewed With: patient           Outcome Evaluation: BLE edema improving.  PT converted pt to size 4/5 compressogrip doubled and overlapping for gradient compression, to help further reduce LE edema and improve skin integrity. PT will f/u with compression wrapping change in 2-3 days.  Plan of Care Reviewed With: patient            Time Calculation   PT Charges       Row Name 10/29/23 1045             Time Calculation    Start Time 1045  -MF      PT Goal Re-Cert Due Date 11/04/23  -MF         Untimed Charges    Wound Care 01405 Multilayer comp below knee  -MF      [id number] comp below knee 25  -MF         Total Minutes    Untimed Charges Total Minutes 25  -MF       Total Minutes 25  -MF                User Key  (r) = Recorded By, (t) = Taken By, (c) = Cosigned By      Initials Name Provider Type    Basil Pritchett, PT Physical Therapist                            PT G-Codes  Outcome Measure Options: AM-PAC 6 Clicks Daily Activity (OT)  AM-PAC 6 Clicks Score (PT): 17  AM-PAC 6 Clicks Score (OT): 16  Modified [place] Scale: 4 - Moderately severe disability.  Unable to walk without assistance, and unable to attend to own bodily needs without assistance.       Basil Horvath, PT  10/29/2023

## 2023-10-29 NOTE — ED NOTES
Writer received verbal consent from Aaliyah Shepard @ [phone] for Endoscopy procedure; Guardian states she has no questions about the procedure as she has consented to many of these in the past; Aaliyah states she is about to board a plane and will be landing in [place] @ 2045 and will be unreachable. If we need anything before she lands we can contact her supervisor, Marianna @ [phone]. Aaliyah asked us not to share Marianna's # with the pt.
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
## 2023-10-29 NOTE — ED NOTES
Bed: [facility]  Expected date:   Expected time:   Means of arrival:   Comments:  Triage- hold for Nevin STEWART

## 2023-10-29 NOTE — ED TRIAGE NOTES
"Pt BIBA from group home with c/o L ankle pain. Pt has hx of DVT and \"thinks it's a blood clot\". Been off eliquis since 2020. VSS.      "

## 2023-10-30 ENCOUNTER — APPOINTMENT (OUTPATIENT)
Dept: GENERAL RADIOLOGY | Facility: CLINIC | Age: 33
End: 2023-10-30
Attending: EMERGENCY MEDICINE
Payer: COMMERCIAL

## 2023-10-30 ENCOUNTER — HOSPITAL ENCOUNTER (EMERGENCY)
Facility: CLINIC | Age: 33
Discharge: NURSING, REHAB OR LONG-TERM CARE | End: 2023-10-31
Attending: EMERGENCY MEDICINE | Admitting: EMERGENCY MEDICINE
Payer: COMMERCIAL

## 2023-10-30 DIAGNOSIS — T18.9XXA SWALLOWED FOREIGN BODY, INITIAL ENCOUNTER: ICD-10-CM

## 2023-10-30 PROCEDURE — 99284 EMERGENCY DEPT VISIT MOD MDM: CPT

## 2023-10-30 PROCEDURE — 96372 THER/PROPH/DIAG INJ SC/IM: CPT | Performed by: EMERGENCY MEDICINE

## 2023-10-30 PROCEDURE — 250N000011 HC RX IP 250 OP 636: Performed by: EMERGENCY MEDICINE

## 2023-10-30 PROCEDURE — 74018 RADEX ABDOMEN 1 VIEW: CPT

## 2023-10-30 RX ORDER — MORPHINE SULFATE 2 MG/ML
2 INJECTION, SOLUTION INTRAMUSCULAR; INTRAVENOUS ONCE
Status: COMPLETED | OUTPATIENT
Start: 2023-10-30 | End: 2023-10-30

## 2023-10-30 RX ORDER — KETOROLAC TROMETHAMINE 15 MG/ML
15 INJECTION, SOLUTION INTRAMUSCULAR; INTRAVENOUS ONCE
Status: COMPLETED | OUTPATIENT
Start: 2023-10-30 | End: 2023-10-30

## 2023-10-30 RX ORDER — ONDANSETRON 4 MG/1
4 TABLET, ORALLY DISINTEGRATING ORAL EVERY 6 HOURS PRN
Status: DISCONTINUED | OUTPATIENT
Start: 2023-10-30 | End: 2023-10-31 | Stop reason: HOSPADM

## 2023-10-30 RX ORDER — ACETAMINOPHEN 500 MG
1000 TABLET ORAL ONCE
Status: COMPLETED | OUTPATIENT
Start: 2023-10-30 | End: 2023-10-31

## 2023-10-30 RX ORDER — ONDANSETRON 4 MG/1
4 TABLET, ORALLY DISINTEGRATING ORAL ONCE
Status: COMPLETED | OUTPATIENT
Start: 2023-10-30 | End: 2023-10-30

## 2023-10-30 RX ADMIN — ONDANSETRON 4 MG: 4 TABLET, ORALLY DISINTEGRATING ORAL at 22:37

## 2023-10-30 RX ADMIN — MORPHINE SULFATE 2 MG: 2 INJECTION, SOLUTION INTRAMUSCULAR; INTRAVENOUS at 22:46

## 2023-10-30 ASSESSMENT — ACTIVITIES OF DAILY LIVING (ADL): ADLS_ACUITY_SCORE: 40

## 2023-10-30 NOTE — ED NOTES
Cherry,  ([phone]) spoke to regarding discharge back to facility. Group home is sending staff member to  patient. Plan was confirmed with [facility] Aaliyah Shepard at [phone].

## 2023-10-30 NOTE — CONSULTS
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 GASTROSCOPY, DUODENOSCOPY (EGD), COMBINED N/A 12/8/2022    Procedure: ESOPHAGOGASTRODUODENOSCOPY (EGD) with foreign body removal;  Surgeon: Efrem Begum MD;  Location: [facility] OR    ESOPHAGOSCOPY, GASTROSCOPY, DUODENOSCOPY (EGD), COMBINED N/A 12/28/2022    Procedure: ESOPHAGOGASTRODUODENOSCOPY, WITH FOREIGN BODY REMOVAL;  Surgeon: oDug Hansen MD;  Location:  GI    ESOPHAGOSCOPY, GASTROSCOPY, DUODENOSCOPY (EGD), COMBINED N/A 1/20/2023    Procedure: ESOPHAGOGASTRODUODENOSCOPY (EGD);  Surgeon: Bety Nova MD;  Location:  GI    ESOPHAGOSCOPY, GASTROSCOPY, DUODENOSCOPY (EGD), COMBINED N/A 3/11/2023    Procedure: ESOPHAGOGASTRODUODENOSCOPY WITH FOREIGN BODY REMOVAL;  Surgeon: Cruz Kumar MD;  Location: [facility] OR    ESOPHAGOSCOPY, GASTROSCOPY, DUODENOSCOPY (EGD), COMBINED N/A 10/16/2023    Procedure:  ESOPHAGOGASTRODUODENOSCOPY (EGD) WITH FOREIGN BODY REMOVAL;  Surgeon: Cruz Kumar MD;  Location: [facility] OR    ESOPHAGOSCOPY, GASTROSCOPY, DUODENOSCOPY (EGD), DILATATION, COMBINED N/A 8/30/2021    Procedure: ESOPHAGOGASTRODUODENOSCOPY, WITH DILATION (mngi);  Surgeon: Pat Cervantes MD;  Location: [facility] OR    EXAM UNDER ANESTHESIA ANUS N/A 1/10/2017    Procedure: EXAM UNDER ANESTHESIA ANUS;  Surgeon: Annmarie Haynes MD;  Location: [facility] OR    EXAM UNDER ANESTHESIA RECTUM N/A 7/19/2018    Procedure: EXAM UNDER ANESTHESIA RECTUM;  EXAM UNDER ANESTHESIA, REMOVAL OF RECTAL FOREIGN BODY;  Surgeon: Annmarie Haynes MD;  Location: [facility] OR    HC REMOVE FECAL IMPACTION OR FB W ANESTHESIA N/A 12/18/2016    Procedure: REMOVE FECAL IMPACTION/FOREIGN BODY UNDER ANESTHESIA;  Surgeon: Soham Cano MD;  Location: [facility] OR    KNEE SURGERY Right     KNEE SURGERY - removed a small tissue mass from knee Right     LAPAROSCOPIC ABLATION ENDOMETRIOSIS      LAPAROTOMY EXPLORATORY N/A 1/10/2017    Procedure: LAPAROTOMY EXPLORATORY;  Surgeon: Annmarie Haynes MD;  Location: [facility] OR    LAPAROTOMY EXPLORATORY  09/11/2019    Manual manipulation of bowel to remove pill bottle in rectum    lymph nodes removed from neck; benign  age 7    MAMMOPLASTY REDUCTION Bilateral     OTHER SURGICAL HISTORY      foreign body anus removal    MI ESOPHAGOGASTRODUODENOSCOPY TRANSORAL DIAGNOSTIC N/A 1/5/2019    Procedure: ESOPHAGOGASTRODUODENOSCOPY (EGD) with foreign body removal using raptor;  Surgeon: Lila Sol MD;  Location: [facility];  Service: Gastroenterology    MI ESOPHAGOGASTRODUODENOSCOPY TRANSORAL DIAGNOSTIC N/A 1/25/2019    Procedure: ESOPHAGOGASTRODUODENOSCOPY (EGD) removal of foreign body;  Surgeon: Binu Vigil MD;  Location: [facility] OR;  Service: Gastroenterology    MI ESOPHAGOGASTRODUODENOSCOPY TRANSORAL DIAGNOSTIC N/A 1/31/2019    Procedure: ESOPHAGOGASTRODUODENOSCOPY  (EGD);  Surgeon: Siddharth Spears MD;  Location: [facility];  Service: Gastroenterology    OH ESOPHAGOGASTRODUODENOSCOPY TRANSORAL DIAGNOSTIC N/A 8/17/2019    Procedure: ESOPHAGOGASTRODUODENOSCOPY (EGD) with foreign body removal;  Surgeon: Darek Lucero MD;  Location: [facility];  Service: Gastroenterology    OH ESOPHAGOGASTRODUODENOSCOPY TRANSORAL DIAGNOSTIC N/A 9/29/2019    Procedure: ESOPHAGOGASTRODUODENOSCOPY (EGD) with foreign body removal;  Surgeon: Bailey Arnold MD;  Location: [facility];  Service: Gastroenterology    OH ESOPHAGOGASTRODUODENOSCOPY TRANSORAL DIAGNOSTIC N/A 10/3/2019    Procedure: ESOPHAGOGASTRODUODENOSCOPY (EGD), REMOVAL OF FOREIGN BODY;  Surgeon: Chris Lira MD;  Location: [facility];  Service: Gastroenterology    OH ESOPHAGOGASTRODUODENOSCOPY TRANSORAL DIAGNOSTIC N/A 10/6/2019    Procedure: ESOPHAGOGASTRODUODENOSCOPY (EGD) with attempted foreign body removal;  Surgeon: Felipe Connolly MD;  Location: [facility];  Service: Gastroenterology    OH ESOPHAGOGASTRODUODENOSCOPY TRANSORAL DIAGNOSTIC N/A 12/15/2019    Procedure: ESOPHAGOGASTRODUODENOSCOPY (EGD) with foreign body removal;  Surgeon: Jeffy Zuñiga MD;  Location: [facility];  Service: Gastroenterology    OH ESOPHAGOGASTRODUODENOSCOPY TRANSORAL DIAGNOSTIC N/A 12/17/2019    Procedure: ESOPHAGOGASTRODUODENOSCOPY (EGD) with attempted foreign body removal;  Surgeon: Felipe Connolly MD;  Location: [facility];  Service: Gastroenterology    OH ESOPHAGOGASTRODUODENOSCOPY TRANSORAL DIAGNOSTIC N/A 12/21/2019    Procedure: ESOPHAGOGASTRODUODENOSCOPY (EGD) FOR FROEIGN BODY REMOVAL;  Surgeon: Binu Vigil MD;  Location: [facility];  Service: Gastroenterology    OH ESOPHAGOGASTRODUODENOSCOPY TRANSORAL DIAGNOSTIC N/A 7/22/2020    Procedure: ESOPHAGOGASTRODUODENOSCOPY (EGD);  Surgeon: Bailey Arnold MD;  Location: [facility];  Service:  Gastroenterology    RI ESOPHAGOGASTRODUODENOSCOPY TRANSORAL DIAGNOSTIC N/A 8/14/2020    Procedure: ESOPHAGOGASTRODUODENOSCOPY (EGD) FOREIGN BODY REMOVAL;  Surgeon: Jeffy Zuñiga MD;  Location: [facility];  Service: Gastroenterology    RI ESOPHAGOGASTRODUODENOSCOPY TRANSORAL DIAGNOSTIC N/A 2/25/2021    Procedure: ESOPHAGOGASTRODUODENOSCOPY (EGD) with foreign body reoval;  Surgeon: Bird Sethi MD;  Location: [facility];  Service: Gastroenterology    RI ESOPHAGOGASTRODUODENOSCOPY TRANSORAL DIAGNOSTIC N/A 4/19/2021    Procedure: ESOPHAGOGASTRODUODENOSCOPY (EGD);  Surgeon: Libia Rose MD;  [facility];  Service: Gastroenterology    RI SURG DIAGNOSTIC EXAM, ANORECTAL N/A 2/5/2020    Procedure: EXAM UNDER ANESTHESIA, Flexible Sigmoidoscopy, Retrieval of Foreign Body;  Surgeon: Sasha Ivan MD;  Location: [facility];  Service: General    RELEASE CARPAL TUNNEL Bilateral     RELEASE CARPAL TUNNEL Bilateral     REMOVAL, FOREIGN BODY, RECTUM N/A 7/21/2021    Procedure: MANUAL RETREIVALOF FOREIGN OBJECT- RECTUM.;  Surgeon: Aleksandra Gerebr MD;  Location: [facility]    SIGMOIDOSCOPY FLEXIBLE N/A 1/10/2017    Procedure: SIGMOIDOSCOPY FLEXIBLE;  Surgeon: Annmarie Haynes MD;  Location:  OR    SIGMOIDOSCOPY FLEXIBLE N/A 5/8/2018    Procedure: SIGMOIDOSCOPY FLEXIBLE;  flex sig with foreign body removal using snare and rattooth forcep;  Surgeon: Soham Cano MD;  Location: [facility]    SIGMOIDOSCOPY FLEXIBLE N/A 2/20/2019    Procedure: Exam under anesthesia Colonoscopy with attempted  removal of rectal foreign body;  Surgeon: Estrada Chávez MD;  [facility]       Social History     Tobacco Use    Smoking status: Never    Smokeless tobacco: Never   Substance Use Topics    Alcohol use: No     Alcohol/week: 0.0 standard drinks of alcohol       Family History   Problem Relation Age of Onset    Diabetes Type 2  Maternal Grandmother     Diabetes Type 2  Paternal  "Grandmother     Breast Cancer Paternal Grandmother     Cerebrovascular Disease Father 53    Myocardial Infarction No family hx of     Coronary Artery Disease Early Onset No family hx of     Ovarian Cancer No family hx of     Colon Cancer No family hx of     Depression Mother     Anxiety Disorder Mother        REVIEW OF SYSTEMS:     5 point ROS negative except as noted above in HPI, including Gen., Resp., CV, GI &  system review.      PHYSICAL EXAM:   /66   Pulse 74   Temp 97.8  F (36.6  C) (Temporal)   Resp 14   Ht 1.575 m (5' 2\")   Wt 127.9 kg (282 lb)   LMP 10/09/2023   SpO2 95%   BMI 51.58 kg/m   Estimated body mass index is 51.58 kg/m  as calculated from the following:    Height as of this encounter: 1.575 m (5' 2\").    Weight as of this encounter: 127.9 kg (282 lb).   GENERAL APPEARANCE: healthy, alert, and no distress  MENTAL STATUS: alert  AIRWAY EXAM: Mallampatti Class I (visualization of the soft palate, fauces, uvula, anterior and posterior pillars)  RESP: lungs clear to auscultation - no rales, rhonchi or wheezes  CV: normal S1 S2, no S3 or S4      DIAGNOSTICS:    Not indicated      IMPRESSION   ASA Class 1 - Healthy patient, no medical problems        PLAN:       Plan for esophagogastroduodenoscopy (upper GI endoscopy). We discussed the risks, benefits and alternatives and the patient wished to proceed.    The above has been forwarded to the consulting provider.      Signed Electronically by: Kash Griffin MD,MD  October 29, 2023      [facility]  Ph: [phone] Fax: [phone]    "

## 2023-10-31 ENCOUNTER — TELEPHONE (OUTPATIENT)
Dept: GASTROENTEROLOGY | Facility: CLINIC | Age: 33
End: 2023-10-31

## 2023-10-31 ENCOUNTER — VIRTUAL VISIT (OUTPATIENT)
Dept: GASTROENTEROLOGY | Facility: CLINIC | Age: 33
End: 2023-10-31
Payer: COMMERCIAL

## 2023-10-31 ENCOUNTER — PATIENT OUTREACH (OUTPATIENT)
Dept: GASTROENTEROLOGY | Facility: CLINIC | Age: 33
End: 2023-10-31

## 2023-10-31 VITALS
WEIGHT: 280 LBS | RESPIRATION RATE: 16 BRPM | SYSTOLIC BLOOD PRESSURE: 130 MMHG | BODY MASS INDEX: 51.53 KG/M2 | OXYGEN SATURATION: 97 % | HEIGHT: 62 IN | DIASTOLIC BLOOD PRESSURE: 70 MMHG | HEART RATE: 99 BPM | TEMPERATURE: 98.4 F

## 2023-10-31 DIAGNOSIS — T18.9XXS SWALLOWED FOREIGN BODY, SEQUELA: ICD-10-CM

## 2023-10-31 DIAGNOSIS — R19.8 IRREGULAR BOWEL HABITS: Primary | ICD-10-CM

## 2023-10-31 PROBLEM — F41.9 ANXIETY DISORDER: Status: ACTIVE | Noted: 2019-01-13

## 2023-10-31 PROCEDURE — 99417 PROLNG OP E/M EACH 15 MIN: CPT | Mod: VID | Performed by: PHYSICIAN ASSISTANT

## 2023-10-31 PROCEDURE — 250N000011 HC RX IP 250 OP 636: Performed by: EMERGENCY MEDICINE

## 2023-10-31 PROCEDURE — 250N000013 HC RX MED GY IP 250 OP 250 PS 637: Performed by: EMERGENCY MEDICINE

## 2023-10-31 PROCEDURE — 99215 OFFICE O/P EST HI 40 MIN: CPT | Mod: VID | Performed by: PHYSICIAN ASSISTANT

## 2023-10-31 RX ORDER — ACETAMINOPHEN 325 MG/1
650 TABLET ORAL ONCE
Status: COMPLETED | OUTPATIENT
Start: 2023-10-31 | End: 2023-10-31

## 2023-10-31 RX ORDER — POLYETHYLENE GLYCOL 3350 17 G/17G
1 POWDER, FOR SOLUTION ORAL DAILY
Qty: 30 PACKET | Refills: 1 | Status: SHIPPED | OUTPATIENT
Start: 2023-10-31 | End: 2023-12-30

## 2023-10-31 RX ORDER — MAGNESIUM HYDROXIDE/ALUMINUM HYDROXICE/SIMETHICONE 120; 1200; 1200 MG/30ML; MG/30ML; MG/30ML
15 SUSPENSION ORAL ONCE
Status: COMPLETED | OUTPATIENT
Start: 2023-10-31 | End: 2023-10-31

## 2023-10-31 RX ADMIN — ACETAMINOPHEN 1000 MG: 500 TABLET, FILM COATED ORAL at 00:08

## 2023-10-31 RX ADMIN — ONDANSETRON 4 MG: 4 TABLET, ORALLY DISINTEGRATING ORAL at 00:09

## 2023-10-31 RX ADMIN — ACETAMINOPHEN 650 MG: 325 TABLET, FILM COATED ORAL at 05:08

## 2023-10-31 RX ADMIN — ALUMINUM HYDROXIDE, MAGNESIUM HYDROXIDE, AND SIMETHICONE 15 ML: 200; 200; 20 SUSPENSION ORAL at 01:37

## 2023-10-31 ASSESSMENT — ACTIVITIES OF DAILY LIVING (ADL)
ADLS_ACUITY_SCORE: 40

## 2023-10-31 ASSESSMENT — PAIN SCALES - GENERAL: PAINLEVEL: EXTREME PAIN (8)

## 2023-10-31 NOTE — ED PROVIDER NOTES
Nevin Alvarado is a 32 year old female was signed out to me awaiting DEC evaluation.  She had swallowed a battery.  DEC cleared patient for discharge.  Patient was discharged home.     Bib Doherty MD  10/31/23 0708

## 2023-10-31 NOTE — ED NOTES
[facility]  ED to EMPATH Checklist:      Goal for EMPATH: Other:foreign body ingestion ,intent to self harm but no suicidal thought    Current Behavior: Calm    Safety Concerns: At risk for injuring self or others based on    Legal Hold Status: Voluntary    Medically Cleared by ED provider: Vital signs stable,pain reliever given,zofran    Patient Therapeutically Searched: N/A    Belongings: Remain with patient    Independent Ambulation at Baseline: Yes/No: Yes    Participates in Care/Conversation: Yes/No: Yes    Patient Informed about EMPATH: Yes/No: Yes    DEC: Ordered and pending    Patient Ready to be Transferred to EMPATH? Yes/No: Yes

## 2023-10-31 NOTE — DISCHARGE INSTRUCTIONS
"Aftercare Plan  If I am feeling unsafe or I am in a crisis, I will:   Contact my established care providers   Call the National Suicide Prevention Lifeline: 988  Go to the nearest emergency room   Call 911   [facility] Crisis Line Number: [phone]     Today you were seen by a licensed mental health professional through Triage and Transition services, [facility] ([facility])  for a crisis assessment in the Emergency Department at [facility].  It is recommended that you follow up with your established providers (psychiatrist, mental health therapist, and/or primary care doctor - as relevant) as soon as possible.     Coordinators from [facility] will be calling you in the next 24-48 hours to ensure that you have the resources you need.  You can also contact [facility] coordinators directly at [phone]. You may have been scheduled for or offered an appointment with a mental health provider. [facility] maintains an extensive network of licensed behavioral health providers to connect patients with the services they need.  We do not charge providers a fee to participate in our referral network.  We match patients with providers based on a patient's specific needs, insurance coverage, and location.  Our first effort will be to refer you to a provider within your care system, and will utilize providers outside your care system as needed.     Warning signs that I or other people might notice when a crisis is developing for me: Becoming overwhelmed; being sensitive to words and behavior of someone else that ends up affecting me even more negatively; worrying about what may happen in future; focusing on thoughts not supportive of my well-being     Things I am able to do on my own to cope or help me feel better: Use \"short, free mindfulness\" videos on YouTube;  explore community events near me that I may find interesting;  download the [employer] pavan and search for activities I enjoy for more social support (the other " people using MeetUp are also seeking support and the company of others); write down my thoughts and feelings intermittently to help me stay grounded with my own thoughts    Things that I am able to do with others to cope or help me better: Spend time with the people I enjoy doing some of the things I enjoy; find those groups that are around me and show up for an event I want to try; talk with those people who are supportive, healthy and I like being with;  talk with therapist and DBT group about recent concerns of recent increases in dysregulation     Things I can use or do for distraction: Use the [employer] videos; get creative outlet by using Leg[employer] to create either with self or others; check out meet up options; use [employer] phone line; listen to the music I like; read the type of material that I like     Changes I can make to support my mental health and wellness: Tell people respectfully but directly what I need and I won't worry or speculate about what they may think;  find some alternative supports to talk with and to engage in activities with;   I will avoid isolating and will continue to make progress ; while accepting there may be occasional set backs, know I am moving in the direction I want to be --that I have made good progress over the years.   I will be gentle with me, my care and my self talk.  I do not deserve to be bullied by anyone.  I will be kind to myself as well.     Your [facility] has a mental health crisis team you can call 24/7: [facility] Crisis  [phone]        Crisis Lines  Crisis Text Line  Text 188695  You will be connected with a trained live crisis counselor to provide support.    Por talha, gradyo  KHADIJAH a 577539 o texto a 442-AYUDAME en WhatsApp    [employer] (LGBTQ Youth Crisis Line)  6.036.709.3487  text START to 586-761      Community Resources  Fast Tracker  Linking people to mental health and substance use disorder resources  [employer]     [facility]  "Health Warm Line  Peer to peer support  Monday thru Saturday, 12 pm to 10 pm  339.223.2950 or 5.419.996.9496  Text \"Support\" to 70227    National [employer] Mental Illness (ELIJAH)  075.832.1811 or 1.888.ELIJAH.HELPS      Mental Health Apps  My3  https://my[employer] Pharmaceuticalspp.org/    VirtualHopeBox  https://Show[employer].org/apps/virtual-hope-box/                "
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
## 2023-10-31 NOTE — ED PROVIDER NOTES
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 acute distress.  She is tolerating her secretions and breathing comfortably on room air.  She endorses abdominal discomfort and nausea.  She is given 2 mg morphine and Zofran.  X-ray is obtained and confirms the battery in the GI tract. I  spoke to Dr. Griffin.  He reported that generally with batteries such as this, there is no indication for endoscopy with removal.  This should pass spontaneously without any complication.  The patient desires to speak to DEC regarding her intrusive thoughts and self-harm.  This seems appropriate given that she has been seen in the emergency department several times over the last few days for self-harm by ingestion.  She is hemodynamically stable, not peritonitic, tolerating her secretions, and breathing comfortably on room air.  She is able to drink water without any difficulty.  Currently awaiting DEC assessment at time of signout to my oncoming colleague.      Diagnosis:    ICD-10-CM    1. Swallowed foreign body, initial encounter  T18.9XXA            Discharge Medications:  New Prescriptions    No medications on file          Kamar Cosme MD  10/30/2023   Kamar Cosme MD Peery, Stephen, MD  10/31/23 0125

## 2023-10-31 NOTE — TELEPHONE ENCOUNTER
Pt calling in to see if she can have her visit with Carli Potter sooner today. Pt is scheduled for 1:45 pm today which is the soonest appt.  Triaged pt symptoms of abdominal pain following swallowing of foreign object and ED visit yesterday. Note below.      'Patient is hemodynamically stable and in no acute distress.  She is tolerating her secretions and breathing comfortably on room air.  She endorses abdominal discomfort and nausea.  She is given 2 mg morphine and Zofran.  X-ray is obtained and confirms the battery in the GI tract. I spoke to Dr. Griffin.  He reported that generally with batteries such as this, there is no indication for endoscopy with removal.'    Let pt know that if she has black tarry stools, vomits blood, spikes a fever or feels like passing out she needs to seek emergent care. Otherwise she can discuss further care planning with provider this afternoon. Pt in agreement with the plan.

## 2023-10-31 NOTE — LETTER
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    Procedure: UPPER ENDOSCOPY TO REMOVE PAPER CLIPS;  Surgeon: Hira Jacobs MD;  Location: [facility];  Service: Gastroenterology    ESOPHAGOSCOPY, GASTROSCOPY, DUODENOSCOPY (EGD), COMBINED N/A 8/1/2021    Procedure: ESOPHAGOGASTRODUODENOSCOPY (EGD);  Surgeon: Binu Vigil MD;  Location: [facility]    ESOPHAGOSCOPY, GASTROSCOPY, DUODENOSCOPY (EGD), COMBINED N/A 7/31/2021    Procedure: ESOPHAGOGASTRODUODENOSCOPY (EGD);  Surgeon: Keith Quinn MD;  Location: [facility]    ESOPHAGOSCOPY, GASTROSCOPY,  DUODENOSCOPY (EGD), COMBINED N/A 8/13/2021    Procedure: ESOPHAGOGASTRODUODENOSCOPY (EGD);  Surgeon: Gustavo Mathew MD;  Location: [facility]    ESOPHAGOSCOPY, GASTROSCOPY, DUODENOSCOPY (EGD), COMBINED N/A 8/13/2021    Procedure: ESOPHAGOGASTRODUODENOSCOPY (EGD) with foreign body removal;  Surgeon: Gustavo Mathew MD;  Location: [facility]    ESOPHAGOSCOPY, GASTROSCOPY, DUODENOSCOPY (EGD), COMBINED N/A 1/30/2022    Procedure: ESOPHAGOGASTRODUODENOSCOPY (EGD) FOREIGN BODY REMOVAL;  Surgeon: Bird Sethi MD;  Location: [facility] OR    ESOPHAGOSCOPY, GASTROSCOPY, DUODENOSCOPY (EGD), COMBINED N/A 2/3/2022    Procedure: ESOPHAGOGASTRODUODENOSCOPY (EGD), FOREIGN BODY REMOVAL;  Surgeon: Binu Vigil MD;  Location: [facility] OR    ESOPHAGOSCOPY, GASTROSCOPY, DUODENOSCOPY (EGD), COMBINED N/A 2/7/2022    Procedure: ESOPHAGOGASTRODUODENOSCOPY (EGD) WITH FOREIGN BODY REMOVAL;  Surgeon: Darek Mendoza MD;  Location: [facility] OR    ESOPHAGOSCOPY, GASTROSCOPY, DUODENOSCOPY (EGD), COMBINED N/A 2/8/2022    Procedure: ESOPHAGOGASTRODUODENOSCOPY (EGD), foreign body removal;  Surgeon: Lyndsey Mendoza DO;  Location:  OR    ESOPHAGOSCOPY, GASTROSCOPY, DUODENOSCOPY (EGD), COMBINED N/A 2/15/2022    Procedure: UPPER ESOPHAGOGASTRODUODENOSCOPY, WITH FOREIGN BODY REMOVAL AND USE OF BLANKENSHIP;  Surgeon: Samia Stanton MD;  [facility]    ESOPHAGOSCOPY, GASTROSCOPY, DUODENOSCOPY (EGD), COMBINED N/A 7/9/2022    Procedure: ESOPHAGOGASTRODUODENOSCOPY (EGD) with foreign body extraction;  Surgeon: Felipe Ulloa DO;  [facility]    ESOPHAGOSCOPY, GASTROSCOPY, DUODENOSCOPY (EGD), COMBINED N/A 7/29/2022    Procedure: ESOPHAGOGASTRODUODENOSCOPY (EGD) WITH FOREIGN BODY REMOVAL;  Surgeon: Pamela Perez MD;  [facility]    ESOPHAGOSCOPY, GASTROSCOPY, DUODENOSCOPY (EGD), COMBINED N/A 8/6/2022    Procedure: ESOPHAGOGASTRODUODENOSCOPY, WITH FOREIGN BODY REMOVAL;  Surgeon: Avtar  MD Bety;  Location:  GI    ESOPHAGOSCOPY, GASTROSCOPY, DUODENOSCOPY (EGD), COMBINED N/A 8/13/2022    Procedure: ESOPHAGOGASTRODUODENOSCOPY, WITH FOREIGN BODY REMOVAL using raptor device;  Surgeon: Brice Ramirez MD;  Location:  GI    ESOPHAGOSCOPY, GASTROSCOPY, DUODENOSCOPY (EGD), COMBINED N/A 8/24/2022    Procedure: ESOPHAGOGASTRODUODENOSCOPY (EGD);  Surgeon: Jeffy Bradley MD;  Location:  GI    ESOPHAGOSCOPY, GASTROSCOPY, DUODENOSCOPY (EGD), COMBINED N/A 9/17/2022    Procedure: ESOPHAGOGASTRODUODENOSCOPY (EGD), Foreign Body removal;  Surgeon: Pamela Perez MD;  [facility]    ESOPHAGOSCOPY, GASTROSCOPY, DUODENOSCOPY (EGD), COMBINED N/A 9/25/2022    Procedure: ESOPHAGOGASTRODUODENOSCOPY, WITH FOREIGN BODY REMOVAL;  Surgeon: Kash Griffin MD;  Location:  GI    ESOPHAGOSCOPY, GASTROSCOPY, DUODENOSCOPY (EGD), COMBINED N/A 10/23/2022    Procedure: ESOPHAGOGASTRODUODENOSCOPY (EGD) FOR REMOVAL OF FOREIGN BODY;  Surgeon: Barney Pinto MD;  Location: [facility] OR    ESOPHAGOSCOPY, GASTROSCOPY, DUODENOSCOPY (EGD), COMBINED N/A 11/3/2022    Procedure: ESOPHAGOGASTRODUODENOSCOPY (EGD) for foreign body removal;  Surgeon: rCuz Kumar MD;  Location: [facility] OR    ESOPHAGOSCOPY, GASTROSCOPY, DUODENOSCOPY (EGD), COMBINED N/A 11/29/2022    Procedure: ESOPHAGOGASTRODUODENOSCOPY (EGD);  Surgeon: Cristino Kelsey MD, MD;  Location: [facility]    ESOPHAGOSCOPY, GASTROSCOPY, DUODENOSCOPY (EGD), COMBINED N/A 12/8/2022    Procedure: ESOPHAGOGASTRODUODENOSCOPY (EGD) with foreign body removal;  Surgeon: Efrem Begum MD;  Location: [facility] OR    ESOPHAGOSCOPY, GASTROSCOPY, DUODENOSCOPY (EGD), COMBINED N/A 12/28/2022    Procedure: ESOPHAGOGASTRODUODENOSCOPY, WITH FOREIGN BODY REMOVAL;  Surgeon: Doug Hansen MD;  Location: [facility]    ESOPHAGOSCOPY, GASTROSCOPY, DUODENOSCOPY (EGD), COMBINED N/A 1/20/2023    Procedure:  ESOPHAGOGASTRODUODENOSCOPY (EGD);  Surgeon: Bety Nova MD;  Location:  GI    ESOPHAGOSCOPY, GASTROSCOPY, DUODENOSCOPY (EGD), COMBINED N/A 3/11/2023    Procedure: ESOPHAGOGASTRODUODENOSCOPY WITH FOREIGN BODY REMOVAL;  Surgeon: Cruz Kumar MD;  Location: [facility] OR    ESOPHAGOSCOPY, GASTROSCOPY, DUODENOSCOPY (EGD), COMBINED N/A 10/16/2023    Procedure: ESOPHAGOGASTRODUODENOSCOPY (EGD) WITH FOREIGN BODY REMOVAL;  Surgeon: Cruz Kumar MD;  Location[facility]    ESOPHAGOSCOPY, GASTROSCOPY, DUODENOSCOPY (EGD), COMBINED N/A 10/29/2023    Procedure: ESOPHAGOGASTRODUODENOSCOPY, WITH FOREIGN BODY REMOVAL;  Surgeon: Kash Griffin MD;  Location:  GI    ESOPHAGOSCOPY, GASTROSCOPY, DUODENOSCOPY (EGD), DILATATION, COMBINED N/A 8/30/2021    Procedure: ESOPHAGOGASTRODUODENOSCOPY, WITH DILATION (mngi);  Surgeon: Pat Cervantes MD;  [facility]: [facility]    EXAM UNDER ANESTHESIA ANUS N/A 1/10/2017    Procedure: EXAM UNDER ANESTHESIA ANUS;  Surgeon: Annmarie Haynes MD;  [facility]    EXAM UNDER ANESTHESIA RECTUM N/A 7/19/2018    Procedure: EXAM UNDER ANESTHESIA RECTUM;  EXAM UNDER ANESTHESIA, REMOVAL OF RECTAL FOREIGN BODY;  Surgeon: Annmarie Haynes MD;  [facility]    HC REMOVE FECAL IMPACTION OR FB W ANESTHESIA N/A 12/18/2016    Procedure: REMOVE FECAL IMPACTION/FOREIGN BODY UNDER ANESTHESIA;  Surgeon: Soham Cano MD;  Location: [facility] OR    KNEE SURGERY Right     KNEE SURGERY - removed a small tissue mass from knee Right     LAPAROSCOPIC ABLATION ENDOMETRIOSIS      LAPAROTOMY EXPLORATORY N/A 1/10/2017    Procedure: LAPAROTOMY EXPLORATORY;  Surgeon: Annmarie Haynes MD;  [facility]    LAPAROTOMY EXPLORATORY  09/11/2019    Manual manipulation of bowel to remove pill bottle in rectum    lymph nodes removed from neck; benign  age 7    MAMMOPLASTY REDUCTION Bilateral     OTHER SURGICAL HISTORY      foreign body anus removal    SC  ESOPHAGOGASTRODUODENOSCOPY TRANSORAL DIAGNOSTIC N/A 1/5/2019    Procedure: ESOPHAGOGASTRODUODENOSCOPY (EGD) with foreign body removal using raptor;  Surgeon: Lila Sol MD;  Location: [facility];  Service: Gastroenterology    [facility] ESOPHAGOGASTRODUODENOSCOPY TRANSORAL DIAGNOSTIC N/A 1/25/2019    Procedure: ESOPHAGOGASTRODUODENOSCOPY (EGD) removal of foreign body;  Surgeon: Binu Vigil MD;  Location: [facility];  Service: Gastroenterology    VA ESOPHAGOGASTRODUODENOSCOPY TRANSORAL DIAGNOSTIC N/A 1/31/2019    Procedure: ESOPHAGOGASTRODUODENOSCOPY (EGD);  Surgeon: Siddharth Spears MD;  Location: [facility];  Service: Gastroenterology    VA ESOPHAGOGASTRODUODENOSCOPY TRANSORAL DIAGNOSTIC N/A 8/17/2019    Procedure: ESOPHAGOGASTRODUODENOSCOPY (EGD) with foreign body removal;  Surgeon: Darek Lucero MD;  Location: [facility];  Service: Gastroenterology    [facility] ESOPHAGOGASTRODUODENOSCOPY TRANSORAL DIAGNOSTIC N/A 9/29/2019    Procedure: ESOPHAGOGASTRODUODENOSCOPY (EGD) with foreign body removal;  Surgeon: Bailey Arnold MD;  Location: [facility];  Service: Gastroenterology    VA ESOPHAGOGASTRODUODENOSCOPY TRANSORAL DIAGNOSTIC N/A 10/3/2019    Procedure: ESOPHAGOGASTRODUODENOSCOPY (EGD), REMOVAL OF FOREIGN BODY;  Surgeon: Chris Lira MD;  Location: [facility];  Service: Gastroenterology    [facility] ESOPHAGOGASTRODUODENOSCOPY TRANSORAL DIAGNOSTIC N/A 10/6/2019    Procedure: ESOPHAGOGASTRODUODENOSCOPY (EGD) with attempted foreign body removal;  Surgeon: Felipe Connolly MD;  Location: [facility];  Service: Gastroenterology    VA ESOPHAGOGASTRODUODENOSCOPY TRANSORAL DIAGNOSTIC N/A 12/15/2019    Procedure: ESOPHAGOGASTRODUODENOSCOPY (EGD) with foreign body removal;  Surgeon: Jeffy Zuñiga MD;  Location: [facility];  Service: Gastroenterology    [facility] ESOPHAGOGASTRODUODENOSCOPY TRANSORAL DIAGNOSTIC N/A 12/17/2019    Procedure:  ESOPHAGOGASTRODUODENOSCOPY (EGD) with attempted foreign body removal;  Surgeon: Felipe Connolly MD;  Location: [facility] GI;  Service: Gastroenterology    [place] ESOPHAGOGASTRODUODENOSCOPY TRANSORAL DIAGNOSTIC N/A 12/21/2019    Procedure: ESOPHAGOGASTRODUODENOSCOPY (EGD) FOR FROEIGN BODY REMOVAL;  Surgeon: Binu Vigil MD;  Location: [facility];  Service: Gastroenterology    [place] ESOPHAGOGASTRODUODENOSCOPY TRANSORAL DIAGNOSTIC N/A 7/22/2020    Procedure: ESOPHAGOGASTRODUODENOSCOPY (EGD);  Surgeon: Bailey Arnold MD;  Location: [facility];  Service: Gastroenterology    [place] ESOPHAGOGASTRODUODENOSCOPY TRANSORAL DIAGNOSTIC N/A 8/14/2020    Procedure: ESOPHAGOGASTRODUODENOSCOPY (EGD) FOREIGN BODY REMOVAL;  Surgeon: Jeffy Zuñiga MD;  Location: [facility];  Service: Gastroenterology    [place] ESOPHAGOGASTRODUODENOSCOPY TRANSORAL DIAGNOSTIC N/A 2/25/2021    Procedure: ESOPHAGOGASTRODUODENOSCOPY (EGD) with foreign body reoval;  Surgeon: Bird Sethi MD;  Location: [facility];  Service: Gastroenterology    [place] ESOPHAGOGASTRODUODENOSCOPY TRANSORAL DIAGNOSTIC N/A 4/19/2021    Procedure: ESOPHAGOGASTRODUODENOSCOPY (EGD);  Surgeon: Libia Rose MD;  Location: [facility];  Service: Gastroenterology    [place] SURG DIAGNOSTIC EXAM, ANORECTAL N/A 2/5/2020    Procedure: EXAM UNDER ANESTHESIA, Flexible Sigmoidoscopy, Retrieval of Foreign Body;  Surgeon: Sasha Ivan MD;  Location: [facility];  Service: General    RELEASE CARPAL TUNNEL Bilateral     RELEASE CARPAL TUNNEL Bilateral     REMOVAL, FOREIGN BODY, RECTUM N/A 7/21/2021    Procedure: MANUAL RETREIVALOF FOREIGN OBJECT- RECTUM.;  Surgeon: Aleksandra Gerber MD;  Location: [facility] [place] [place] OR    SIGMOIDOSCOPY FLEXIBLE N/A 1/10/2017    Procedure: SIGMOIDOSCOPY FLEXIBLE;  Surgeon: Annmarie Haynes MD;  Location:  OR    SIGMOIDOSCOPY FLEXIBLE N/A 5/8/2018    Procedure: SIGMOIDOSCOPY FLEXIBLE;  flex sig with foreign  body removal using snare and rattooth forcep;  Surgeon: Soham Cano MD;  Location:  GI    SIGMOIDOSCOPY FLEXIBLE N/A 2/20/2019    Procedure: Exam under anesthesia Colonoscopy with attempted  removal of rectal foreign body;  Surgeon: Estrada Chávez MD;  [facility]       Social History     Socioeconomic History    Marital status: Single     Spouse name: Not on file    Number of children: Not on file    Years of education: Not on file    Highest education level: Not on file   Occupational History    Occupation: On disability   Tobacco Use    Smoking status: Never    Smokeless tobacco: Never   Vaping Use    Vaping Use: Not on file   Substance and Sexual Activity    Alcohol use: No     Alcohol/week: 0.0 standard drinks of alcohol    Drug use: No    Sexual activity: Not Currently     Partners: Male     Birth control/protection: I.U.D.     Comment: IUD - Mirena - placed July, 2015   Other Topics Concern    Parent/sibling w/ CABG, MI or angioplasty before 65F 55M? Not Asked   Social History Narrative    Single.    Living in independent living portion of [employer].    On disability.    No regular exercise.      Social Determinants of Health     Financial Resource Strain: Not on file   Food Insecurity: Not on file   Transportation Needs: Not on file   Physical Activity: Not on file   Stress: Not on file   Social Connections: Not on file   Interpersonal Safety: Not on file   Housing Stability: Not on file       Family History   Problem Relation Age of Onset    Diabetes Type 2  Maternal Grandmother     Diabetes Type 2  Paternal Grandmother     Breast Cancer Paternal Grandmother     Cerebrovascular Disease Father 53    Myocardial Infarction No family hx of     Coronary Artery Disease Early Onset No family hx of     Ovarian Cancer No family hx of     Colon Cancer No family hx of     Depression Mother     Anxiety Disorder Mother      Family history reviewed and edited as appropriate    Medications and  Allergies:     Outpatient Encounter Medications as of 10/31/2023   Medication Sig Dispense Refill    albuterol (PROAIR HFA/PROVENTIL HFA/VENTOLIN HFA) 108 (90 Base) MCG/ACT inhaler Inhale 2 puffs into the lungs every 6 hours as needed for shortness of breath / dyspnea or wheezing 18 g 0    albuterol (PROVENTIL) (2.5 MG/3ML) 0.083% neb solution Take 2.5 mg by nebulization every 6 hours as needed for shortness of breath or wheezing      Ayr Saline Nasal No-Drip GEL Spray 1 Application in nostril daily Apply into each nare 2 times daily Place in front of each side of your nose and breathe in to distribute gel daily. - Each Nare 22 mL 11    BANOPHEN 2-0.1 % external cream Apply 1 applicator topically 2 times daily as needed for itching      brexpiprazole (REXULTI) 1 MG tablet Take 1 mg by mouth at bedtime      cetirizine (ZYRTEC) 10 MG tablet Take 1 tablet (10 mg) by mouth daily 30 tablet 0    Cholecalciferol (D3 HIGH POTENCY) 25 MCG (1000 UT) CAPS Take 50 mcg by mouth daily      clonazePAM (KLONOPIN) 0.5 MG tablet Take 0.5mg daily PRN anxiety- 20 tablets per month, try to keep it to 3-4x per week      cyclobenzaprine (FLEXERIL) 10 MG tablet Take 1 tablet (10 mg) by mouth 3 times daily as needed for muscle spasms 20 tablet 0    ferrous sulfate (FEROSUL) 325 (65 Fe) MG tablet Take 1 tablet (325 mg) by mouth daily (with breakfast) 30 tablet 0    fluocinonide (LIDEX) 0.05 % external cream Apply 1 Application topically 2 times daily as needed Apply thinly to knee 2 times daily, Monday through Fridays, off on weekends as needed. Avoid face and skin folds.      furosemide (LASIX) 20 MG tablet Take 20 mg by mouth daily      hydroxychloroquine (PLAQUENIL) 200 MG tablet Take 200 mg by mouth daily      Lidocaine (LIDOCARE) 4 % Patch Place 1 patch onto the skin every 24 hours To prevent lidocaine toxicity, patient should be patch free for 12 hrs daily. 10 patch 0    metFORMIN (GLUCOPHAGE XR) 500 MG 24 hr tablet Take 1,000 mg by  mouth daily (with breakfast)      montelukast (SINGULAIR) 10 MG tablet Take 10 mg by mouth every evening      nabumetone (RELAFEN) 750 MG tablet Take 750 mg by mouth 2 times daily      norethindrone (AYGESTIN) 5 MG tablet Take 5 mg by mouth daily      Nutritional Supplements (ENSURE MAX PROTEIN) LIQD Take 240 mLs by mouth daily 7200 mL 11    omeprazole (PRILOSEC) 40 MG DR capsule Take 1 capsule (40 mg) by mouth daily 90 capsule 3    ondansetron (ZOFRAN ODT) 4 MG ODT tab Take 1 tablet (4 mg) by mouth every 8 hours as needed for nausea 10 tablet 0    ondansetron (ZOFRAN-ODT) 4 MG ODT tab Take 1 tablet (4 mg) by mouth every 8 hours as needed for nausea 15 tablet 0    pregabalin (LYRICA) 100 MG capsule Take 100 mg by mouth every morning      pregabalin (LYRICA) 100 MG capsule Take 200 mg by mouth at bedtime      Respiratory Therapy Supplies (NEBULIZER) BRENDAN Nebulizer device.  Albuterol nebulization every 2 hours as needed for shortness of breath or wheezing. 1 each 0    Semaglutide (RYBELSUS) 14 MG tablet Take 14 mg by mouth daily 90 tablet 3    Semaglutide-Weight Management (WEGOVY) 0.5 MG/0.5ML pen Inject 0.5 mg Subcutaneous every 7 days 2 mL 1    sodium chloride (OCEAN) 0.65 % nasal spray Spray 2 sprays in each side of the nose every 3 hours while awake. 44 mL 11    SUMAtriptan (IMITREX) 25 MG tablet Take 25 mg by mouth at onset of headache for migraine May repeat in 2 hours.      valACYclovir (VALTREX) 1000 mg tablet Take 2,000 mg by mouth 2 times daily as needed Take 2 tablets by mouth two times daily for one day. Use as needed at onset of cold sore.       Facility-Administered Encounter Medications as of 10/31/2023   Medication Dose Route Frequency Provider Last Rate Last Admin    [COMPLETED] acetaminophen (TYLENOL) tablet 1,000 mg  1,000 mg Oral Once Kamar Cosme MD   1,000 mg at 10/31/23 0008    [COMPLETED] acetaminophen (TYLENOL) tablet 650 mg  650 mg Oral Once Bib Doherty MD   650 mg at 10/31/23  0508    [COMPLETED] alum & mag hydroxide-simethicone (MAALOX) suspension 15 mL  15 mL Oral Once Kamar Cosme MD   15 mL at 10/31/23 0137        Allergies   Allergen Reactions    Amoxicillin-Pot Clavulanate Other (See Comments), Swelling and Rash     PN: facial swelling, left side. Also had cortisone injection the same day as she started the Augmentin.  Noted in downtime recovery of chart.    PN: facial swelling, left side. Also had cortisone injection the same day as she started the Augmentin.; HUT Comment: PN: facial swelling, left side. Also had cortisone injection the same day as she started the Augmentin.Noted in downtime recovery of chart.; HUT Reaction: Rash; HUT Reaction: Unknown; HUT Reaction Type: Allergy; HUT Severity: Med; HUT Noted: [id number]  PN: facial swelling, left side. Also had cortisone injection the same day as she started the Augmentin.  Other reaction(s): *Unknown  PN: facial swelling, left side. Also had cortisone injection the same day as she started the Augmentin.  Noted in downtime recovery of chart.  PN: facial swelling, left side. Also had cortisone injection the same day as she started the Augmentin.  Other reaction(s): Facial swelling  Other reaction(s): Facial swelling    Hydrocodone Nausea and Vomiting and GI Disturbance     vomiting for days, PN: vomiting for days; HUT Comment: vomiting for days; HUT Reaction: Gastrointestinal; HUT Reaction: Nausea And Vomiting; HUT Reaction Type: Intolerance; HUT Severity: Med; HUT Noted: [id number]  vomiting for days    Other reaction(s): Rash    Hydrocodone-Acetaminophen Nausea and Vomiting and Rash     Update on 12/12  Pt says she can take tylenol just not the hydrocodone.   Other reaction(s): Rash      Influenza Vaccines Other (See Comments) and Nausea and Vomiting     HUT Reaction: Nausea And Vomiting; HUT Reaction Type: Intolerance; HUT Severity: Low; HUT Noted: [id number]    Latex Rash     HUT Reaction: Rash; HUT Reaction Type: Allergy; HUT  Severity: Low; HUT Noted: [id number]  Other reaction(s): Rash      Oseltamivir Hives     med stopped, PN: med stopped  med stopped, PN: med stopped; HUT Comment: med stopped, PN: med stopped; HUT Reaction: Hives; HUT Reaction Type: Allergy; HUT Severity: Med; HUT Noted: [id number]    Penicillins Anaphylaxis     HUT Reaction: Anaphylaxis; HUT Reaction Type: Allergy; HUT Severity: High; HUT Noted: 20150904    Vancomycin Itching, Swelling and Rash     Other reaction(s): Redness  Flushed face, very itchy; HUT Comment: Flushed face, very itchy; HUT Reaction: Angioedema; HUT Reaction: Redness; HUT Severity: Med; HUT Noted: [id number]    facial    Blood-Group Specific Substance Other (See Comments)     Patient has an anti-Cw and non-specific antibodies. Blood product orders may be delayed. Draw one red top and two purple top tubes for all type/screen/crossmatch orders.  Patient has anti-Cw, anti-K (Angella), Warm auto and nonspecific antibodies. Blood products may be delayed. Draw patient 24 hours prior to transfusion. Draw one red top and two purple top tubes for all type and screen orders.    Clavulanic Acid Angioedema    Fentanyl Itching    Haemophilus B Polysaccharide Vaccine Nausea and Vomiting    Naltrexone Other (See Comments)     Reaction(s): Vivid dreams.    Other Drug Allergy (See Comments)      See original file MRN [id number]. Files are marked for merge    Oxycodone Swelling    Adhesive Tape Rash     Silicone type  Silicone type    Other reaction(s): adhesive allergy  Other reaction(s): adhesive allergy  Silicone type    Other reaction(s): adhesive allergy      Band-Aid Anti-Itch      Other reaction(s): adhesive allergy    Cephalosporins Rash    Lamotrigine Rash     Possibly associated with Lamictal.   HUT Comment: Possibly associated with Lamictal. ; HUT Reaction: Rash; HUT Reaction Type: Allergy; HUT Severity: Low; HUT Noted: [id number]    No Clinical Screening - See Comments Rash and Other (See Comments)     Silicone  type  Silicone type  See original file MRN [id number]. Files are marked for merge  History of swallowing sharp metallic objects. She should not be prescribed lancets due to posed risk of swallowing.         Review of systems:  A full 10 point review of systems was obtained and was negative except for the pertinent positives and negatives stated within the HPI.    Objective Findings:   Physical Exam:    Constitutional: LMP 10/09/2023   General: Alert, anxious appearing and tearful   Head: Atraumatic, normocephalic, no obvious abnormalities   Eyes: Sclera anicteric, no obvious conjunctival hemorrhage   Nose: Nares normal, no obvious malformation, no obvious rhinorrhea   Respiratory: Normal appearing respirations, no cough, no apparent distress  Musculoskeletal: Range of motion intact, no obvious strength deficit  Skin: No jaundice, no obvious rash  Neurologic: AAOx3, no obvious neurologic abnormality.   Psychiatric: Normal Affect, appropriate mood  Extremities: No obvious edema, no obvious malformation     Labs, Radiology, Pathology     Lab Results   Component Value Date    WBC 8.0 10/29/2023    WBC 6.8 10/02/2023    WBC 8.8 09/23/2023    HGB 14.0 10/29/2023    HGB 13.6 10/02/2023    HGB 14.3 09/23/2023     10/29/2023     10/02/2023     09/23/2023    CHOL 132 02/11/2022    CHOL 130 11/30/2020    CHOL 132 03/21/2018    TRIG 266 (H) 02/11/2022    TRIG 182 (H) 11/30/2020    TRIG 125 03/21/2018    HDL 41 (L) 02/11/2022    HDL 44 (L) 11/30/2020    HDL 48 (L) 03/21/2018    ALT 24 10/02/2023    ALT 19 09/15/2023    ALT 34 05/28/2023    AST 26 10/02/2023    AST 20 09/15/2023    AST 20 05/28/2023     10/29/2023     10/02/2023     09/23/2023    BUN 9.1 10/29/2023    BUN 10.6 10/02/2023    BUN 8.8 09/23/2023    CO2 24 10/29/2023    CO2 24 10/02/2023    CO2 21 (L) 09/23/2023    TSH 4.47 (H) 06/27/2023    TSH 4.94 (H) 02/11/2022    TSH 3.19 11/30/2020    INR 1.11 01/15/2023    INR 1.06  12/28/2022    INR 1.03 10/15/2022        Liver Function Studies -   Recent Labs   Lab Test 01/05/23  1905   PROTTOTAL 6.6   ALBUMIN 3.6   BILITOTAL 0.2   ALKPHOS 70   AST 24   ALT 30          Patient Active Problem List    Diagnosis Date Noted    Right leg paresthesias 05/24/2023     Priority: Medium    Right leg weakness 05/24/2023     Priority: Medium    Right low back pain, unspecified chronicity, unspecified whether sciatica present 05/24/2023     Priority: Medium    Foot drop, left 05/24/2023     Priority: Medium    Diarrhea, unspecified type 01/30/2023     Priority: Medium    Muscle strain of thigh, right, initial encounter 01/11/2023     Priority: Medium    Foreign body ingestion 09/16/2022     Priority: Medium    Foreign body ingestion, initial encounter 02/10/2022     Priority: Medium    Suicide gesture, subsequent encounter (H24) 11/27/2020     Priority: Medium    Gallstones 10/30/2020     Priority: Medium    RUQ abdominal pain 10/30/2020     Priority: Medium    Swallowed foreign body, initial encounter 01/12/2020     Priority: Medium    Swallowed foreign body, initial encounter 01/12/2020     Priority: Medium     Added automatically from request for surgery [id number]      Foreign body in digestive system 12/18/2019     Priority: Medium    Pulmonary embolism (H) 11/30/2019     Priority: Medium    Esophageal stricture 11/30/2019     Priority: Medium     Added automatically from request for surgery [id number]      Poor appetite 11/29/2019     Priority: Medium    High risk medication use 11/05/2019     Priority: Medium    History of posttraumatic stress disorder (PTSD) 11/05/2019     Priority: Medium    Dysphagia 11/03/2019     Priority: Medium    Esophageal perforation 10/24/2019     Priority: Medium     Added automatically from request for surgery [id number]      Esophageal tear, sequela 10/19/2019     Priority: Medium    Other constipation 10/17/2019     Priority: Medium    Epigastric pain 10/15/2019     Priority:  Medium    Polyneuropathy 09/24/2019     Priority: Medium     Overview:   11/9/1165-Mwbde-XEU generalized sensorimotor peripheral neuropathy RUE and RLE worst  ? Hereditary peripheral neuropathy    Demyelinating polyneuropathy. Managed by neurologist at [facility].      S/P laparoscopy 09/21/2019     Priority: Medium    Balance problem 08/30/2019     Priority: Medium    Limited mobility 08/30/2019     Priority: Medium    Rectal pain 08/24/2019     Priority: Medium    Rectal foreign body, initial encounter 02/20/2019     Priority: Medium    Contusion of bone 01/21/2019     Priority: Medium     Bone contusion of medial tibial plateau with mildly depressed fracture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

## 2023-10-31 NOTE — PROGRESS NOTES
Virtual Visit Details    Type of service:  Video Visit   Video Start Time: 206  Video End Time:232    Originating Location (pt. Location): Other snf    Distant Location (provider location):  Off-site  Platform used for Video Visit: Mediam[employer]

## 2023-10-31 NOTE — PROGRESS NOTES
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 GASTROSCOPY, DUODENOSCOPY (EGD), COMBINED N/A 11/25/2018    Procedure: UPPER ENDOSCOPY TO REMOVE PAPER CLIPS;  Surgeon: Hira Jacobs MD;  Location: [facility];  Service: Gastroenterology     ESOPHAGOSCOPY, GASTROSCOPY, DUODENOSCOPY (EGD), COMBINED N/A 8/1/2021    Procedure: ESOPHAGOGASTRODUODENOSCOPY (EGD);  Surgeon: Binu Vigil MD;  Location: [facility] [place]     ESOPHAGOSCOPY, GASTROSCOPY, DUODENOSCOPY (EGD), COMBINED N/A 7/31/2021    Procedure: ESOPHAGOGASTRODUODENOSCOPY (EGD);  Surgeon: Keith Quinn MD;  Location: [facility]     ESOPHAGOSCOPY, GASTROSCOPY, DUODENOSCOPY (EGD), COMBINED N/A 8/13/2021    Procedure: ESOPHAGOGASTRODUODENOSCOPY (EGD);  Surgeon: Gustavo Mathew MD;  Location: [facility]     ESOPHAGOSCOPY, GASTROSCOPY,  DUODENOSCOPY (EGD), COMBINED N/A 8/13/2021    Procedure: ESOPHAGOGASTRODUODENOSCOPY (EGD) with foreign body removal;  Surgeon: Gustavo Mathew MD;  Location: [facility] GI     ESOPHAGOSCOPY, GASTROSCOPY, DUODENOSCOPY (EGD), COMBINED N/A 1/30/2022    Procedure: ESOPHAGOGASTRODUODENOSCOPY (EGD) FOREIGN BODY REMOVAL;  Surgeon: Bird Sethi MD;  Location: [facility] OR     ESOPHAGOSCOPY, GASTROSCOPY, DUODENOSCOPY (EGD), COMBINED N/A 2/3/2022    Procedure: ESOPHAGOGASTRODUODENOSCOPY (EGD), FOREIGN BODY REMOVAL;  Surgeon: Binu Vigil MD;  Location: [facility] OR     ESOPHAGOSCOPY, GASTROSCOPY, DUODENOSCOPY (EGD), COMBINED N/A 2/7/2022    Procedure: ESOPHAGOGASTRODUODENOSCOPY (EGD) WITH FOREIGN BODY REMOVAL;  Surgeon: Darek Mendoza MD;  Location: [facility] OR     ESOPHAGOSCOPY, GASTROSCOPY, DUODENOSCOPY (EGD), COMBINED N/A 2/8/2022    Procedure: ESOPHAGOGASTRODUODENOSCOPY (EGD), foreign body removal;  Surgeon: Lyndsey Mendoza DO;  [facility]     ESOPHAGOSCOPY, GASTROSCOPY, DUODENOSCOPY (EGD), COMBINED N/A 2/15/2022    Procedure: UPPER ESOPHAGOGASTRODUODENOSCOPY, WITH FOREIGN BODY REMOVAL AND USE OF BLANKENSHIP;  Surgeon: Samia Stanton MD;  [facility]     ESOPHAGOSCOPY, GASTROSCOPY, DUODENOSCOPY (EGD), COMBINED N/A 7/9/2022    Procedure: ESOPHAGOGASTRODUODENOSCOPY (EGD) with foreign body extraction;  Surgeon: Felipe Ulloa DO;  [facility]     ESOPHAGOSCOPY, GASTROSCOPY, DUODENOSCOPY (EGD), COMBINED N/A 7/29/2022    Procedure: ESOPHAGOGASTRODUODENOSCOPY (EGD) WITH FOREIGN BODY REMOVAL;  Surgeon: Pamela Perez MD;  [facility]     ESOPHAGOSCOPY, GASTROSCOPY, DUODENOSCOPY (EGD), COMBINED N/A 8/6/2022    Procedure: ESOPHAGOGASTRODUODENOSCOPY, WITH FOREIGN BODY REMOVAL;  Surgeon: Bety Nova MD;  Location: [facility] GI     ESOPHAGOSCOPY, GASTROSCOPY, DUODENOSCOPY (EGD), COMBINED N/A 8/13/2022    Procedure: ESOPHAGOGASTRODUODENOSCOPY, WITH FOREIGN BODY REMOVAL  using raptor device;  Surgeon: Brice Ramirez MD;  Location:  GI     ESOPHAGOSCOPY, GASTROSCOPY, DUODENOSCOPY (EGD), COMBINED N/A 8/24/2022    Procedure: ESOPHAGOGASTRODUODENOSCOPY (EGD);  Surgeon: Jeffy Bradley MD;  Location:  GI     ESOPHAGOSCOPY, GASTROSCOPY, DUODENOSCOPY (EGD), COMBINED N/A 9/17/2022    Procedure: ESOPHAGOGASTRODUODENOSCOPY (EGD), Foreign Body removal;  Surgeon: Pamela Perez MD;  Location:  OR     ESOPHAGOSCOPY, GASTROSCOPY, DUODENOSCOPY (EGD), COMBINED N/A 9/25/2022    Procedure: ESOPHAGOGASTRODUODENOSCOPY, WITH FOREIGN BODY REMOVAL;  Surgeon: Kash Griffin MD;  Location: [facility]     ESOPHAGOSCOPY, GASTROSCOPY, DUODENOSCOPY (EGD), COMBINED N/A 10/23/2022    Procedure: ESOPHAGOGASTRODUODENOSCOPY (EGD) FOR REMOVAL OF FOREIGN BODY;  Surgeon: Barney Pinto MD;  Location: [facility] OR     ESOPHAGOSCOPY, GASTROSCOPY, DUODENOSCOPY (EGD), COMBINED N/A 11/3/2022    Procedure: ESOPHAGOGASTRODUODENOSCOPY (EGD) for foreign body removal;  Surgeon: Cruz Kumar MD;  Location: [facility]     ESOPHAGOSCOPY, GASTROSCOPY, DUODENOSCOPY (EGD), COMBINED N/A 11/29/2022    Procedure: ESOPHAGOGASTRODUODENOSCOPY (EGD);  Surgeon: Cristino Kelsey MD, MD;  Location: [facility]     ESOPHAGOSCOPY, GASTROSCOPY, DUODENOSCOPY (EGD), COMBINED N/A 12/8/2022    Procedure: ESOPHAGOGASTRODUODENOSCOPY (EGD) with foreign body removal;  Surgeon: Efrem Begum MD;  Location: [facility]     ESOPHAGOSCOPY, GASTROSCOPY, DUODENOSCOPY (EGD), COMBINED N/A 12/28/2022    Procedure: ESOPHAGOGASTRODUODENOSCOPY, WITH FOREIGN BODY REMOVAL;  Surgeon: Doug Hansen MD;  Location:  GI     ESOPHAGOSCOPY, GASTROSCOPY, DUODENOSCOPY (EGD), COMBINED N/A 1/20/2023    Procedure: ESOPHAGOGASTRODUODENOSCOPY (EGD);  Surgeon: Bety Nova MD;  Location:  GI     ESOPHAGOSCOPY, GASTROSCOPY, DUODENOSCOPY (EGD), COMBINED N/A 3/11/2023    Procedure:  ESOPHAGOGASTRODUODENOSCOPY WITH FOREIGN BODY REMOVAL;  Surgeon: Cruz Kumar MD;  Location: [facility] OR     ESOPHAGOSCOPY, GASTROSCOPY, DUODENOSCOPY (EGD), COMBINED N/A 10/16/2023    Procedure: ESOPHAGOGASTRODUODENOSCOPY (EGD) WITH FOREIGN BODY REMOVAL;  Surgeon: Cruz Kumar MD;  Location: [facility] OR     ESOPHAGOSCOPY, GASTROSCOPY, DUODENOSCOPY (EGD), COMBINED N/A 10/29/2023    Procedure: ESOPHAGOGASTRODUODENOSCOPY, WITH FOREIGN BODY REMOVAL;  Surgeon: Kash Griffin MD;  Location: [facility]     ESOPHAGOSCOPY, GASTROSCOPY, DUODENOSCOPY (EGD), DILATATION, COMBINED N/A 8/30/2021    Procedure: ESOPHAGOGASTRODUODENOSCOPY, WITH DILATION (mngi);  Surgeon: Pat Cervantes MD;  Location: [facility] OR     EXAM UNDER ANESTHESIA ANUS N/A 1/10/2017    Procedure: EXAM UNDER ANESTHESIA ANUS;  Surgeon: Annmarie Haynes MD;  [facility]     EXAM UNDER ANESTHESIA RECTUM N/A 7/19/2018    Procedure: EXAM UNDER ANESTHESIA RECTUM;  EXAM UNDER ANESTHESIA, REMOVAL OF RECTAL FOREIGN BODY;  Surgeon: Annmarie Haynes MD;  [facility]     HC REMOVE FECAL IMPACTION OR FB W ANESTHESIA N/A 12/18/2016    Procedure: REMOVE FECAL IMPACTION/FOREIGN BODY UNDER ANESTHESIA;  Surgeon: Soham Cano MD;  Location: [facility] OR     KNEE SURGERY Right      KNEE SURGERY - removed a small tissue mass from knee Right      LAPAROSCOPIC ABLATION ENDOMETRIOSIS       LAPAROTOMY EXPLORATORY N/A 1/10/2017    Procedure: LAPAROTOMY EXPLORATORY;  Surgeon: Annmarie Haynes MD;  [facility]     LAPAROTOMY EXPLORATORY  09/11/2019    Manual manipulation of bowel to remove pill bottle in rectum     lymph nodes removed from neck; benign  age 7     MAMMOPLASTY REDUCTION Bilateral      OTHER SURGICAL HISTORY      foreign body anus removal     NC ESOPHAGOGASTRODUODENOSCOPY TRANSORAL DIAGNOSTIC N/A 1/5/2019    Procedure: ESOPHAGOGASTRODUODENOSCOPY (EGD) with foreign body removal using raptor;  Surgeon:  Lila Sol MD;  Location: [facility];  Service: Gastroenterology     NJ ESOPHAGOGASTRODUODENOSCOPY TRANSORAL DIAGNOSTIC N/A 1/25/2019    Procedure: ESOPHAGOGASTRODUODENOSCOPY (EGD) removal of foreign body;  Surgeon: Binu Vigil MD;  Location: [facility] OR;  Service: Gastroenterology     NJ ESOPHAGOGASTRODUODENOSCOPY TRANSORAL DIAGNOSTIC N/A 1/31/2019    Procedure: ESOPHAGOGASTRODUODENOSCOPY (EGD);  Surgeon: Siddharth Spears MD;  Location: [facility] OR;  Service: Gastroenterology     NJ ESOPHAGOGASTRODUODENOSCOPY TRANSORAL DIAGNOSTIC N/A 8/17/2019    Procedure: ESOPHAGOGASTRODUODENOSCOPY (EGD) with foreign body removal;  Surgeon: Darek Lucero MD;  Location: [facility];  Service: Gastroenterology     NJ ESOPHAGOGASTRODUODENOSCOPY TRANSORAL DIAGNOSTIC N/A 9/29/2019    Procedure: ESOPHAGOGASTRODUODENOSCOPY (EGD) with foreign body removal;  Surgeon: Bailey Arnold MD;  Location: [facility];  Service: Gastroenterology     NJ ESOPHAGOGASTRODUODENOSCOPY TRANSORAL DIAGNOSTIC N/A 10/3/2019    Procedure: ESOPHAGOGASTRODUODENOSCOPY (EGD), REMOVAL OF FOREIGN BODY;  Surgeon: Chris Lira MD;  Location: [facility];  Service: Gastroenterology     NJ ESOPHAGOGASTRODUODENOSCOPY TRANSORAL DIAGNOSTIC N/A 10/6/2019    Procedure: ESOPHAGOGASTRODUODENOSCOPY (EGD) with attempted foreign body removal;  Surgeon: Felipe Connolly MD;  Location: [facility];  Service: Gastroenterology     NJ ESOPHAGOGASTRODUODENOSCOPY TRANSORAL DIAGNOSTIC N/A 12/15/2019    Procedure: ESOPHAGOGASTRODUODENOSCOPY (EGD) with foreign body removal;  Surgeon: Jeffy Zuñiga MD;  Location: [facility];  Service: Gastroenterology     NJ ESOPHAGOGASTRODUODENOSCOPY TRANSORAL DIAGNOSTIC N/A 12/17/2019    Procedure: ESOPHAGOGASTRODUODENOSCOPY (EGD) with attempted foreign body removal;  Surgeon: Felipe Connolly MD;  Location: [facility];  Service: Gastroenterology     NJ  ESOPHAGOGASTRODUODENOSCOPY TRANSORAL DIAGNOSTIC N/A 12/21/2019    Procedure: ESOPHAGOGASTRODUODENOSCOPY (EGD) FOR FROEIGN BODY REMOVAL;  Surgeon: Binu Vigil MD;  Location: [facility];  Service: Gastroenterology     [place] ESOPHAGOGASTRODUODENOSCOPY TRANSORAL DIAGNOSTIC N/A 7/22/2020    Procedure: ESOPHAGOGASTRODUODENOSCOPY (EGD);  Surgeon: Bailey Arnold MD;  Location: [facility];  Service: Gastroenterology     [place] ESOPHAGOGASTRODUODENOSCOPY TRANSORAL DIAGNOSTIC N/A 8/14/2020    Procedure: ESOPHAGOGASTRODUODENOSCOPY (EGD) FOREIGN BODY REMOVAL;  Surgeon: Jeffy Zuñiga MD;  Location: [facility];  Service: Gastroenterology     [place] ESOPHAGOGASTRODUODENOSCOPY TRANSORAL DIAGNOSTIC N/A 2/25/2021    Procedure: ESOPHAGOGASTRODUODENOSCOPY (EGD) with foreign body reoval;  Surgeon: Bird Sethi MD;  Location: [facility];  Service: Gastroenterology     IL ESOPHAGOGASTRODUODENOSCOPY TRANSORAL DIAGNOSTIC N/A 4/19/2021    Procedure: ESOPHAGOGASTRODUODENOSCOPY (EGD);  Surgeon: Libia Rose MD;  Location: [facility];  Service: Gastroenterology     IL SURG DIAGNOSTIC EXAM, ANORECTAL N/A 2/5/2020    Procedure: EXAM UNDER ANESTHESIA, Flexible Sigmoidoscopy, Retrieval of Foreign Body;  Surgeon: Sasha Ivan MD;  Location: [facility];  Service: General     RELEASE CARPAL TUNNEL Bilateral      RELEASE CARPAL TUNNEL Bilateral      REMOVAL, FOREIGN BODY, RECTUM N/A 7/21/2021    Procedure: MANUAL RETREIVALOF FOREIGN OBJECT- RECTUM.;  Surgeon: Aleksandra Gerber MD;  Location: [facility] [place] OR     SIGMOIDOSCOPY FLEXIBLE N/A 1/10/2017    Procedure: SIGMOIDOSCOPY FLEXIBLE;  Surgeon: Annmarie Haynes MD;  Location:  OR     SIGMOIDOSCOPY FLEXIBLE N/A 5/8/2018    Procedure: SIGMOIDOSCOPY FLEXIBLE;  flex sig with foreign body removal using snare and rattooth forcep;  Surgeon: Soham Cano MD;  Location: [facility]     SIGMOIDOSCOPY FLEXIBLE N/A 2/20/2019    Procedure:  Exam under anesthesia Colonoscopy with attempted  removal of rectal foreign body;  Surgeon: Estrada Chávez MD;  Location:  OR       Social History     Socioeconomic History     Marital status: Single     Spouse name: Not on file     Number of children: Not on file     Years of education: Not on file     Highest education level: Not on file   Occupational History     Occupation: On disability   Tobacco Use     Smoking status: Never     Smokeless tobacco: Never   Vaping Use     Vaping Use: Not on file   Substance and Sexual Activity     Alcohol use: No     Alcohol/week: 0.0 standard drinks of alcohol     Drug use: No     Sexual activity: Not Currently     Partners: Male     Birth control/protection: I.U.D.     Comment: IUD - Mirena - placed July, 2015   Other Topics Concern     Parent/sibling w/ CABG, MI or angioplasty before 65F 55M? Not Asked   Social History Narrative    Single.    Living in independent living portion of [employer].    On disability.    No regular exercise.      Social Determinants of Health     Financial Resource Strain: Not on file   Food Insecurity: Not on file   Transportation Needs: Not on file   Physical Activity: Not on file   Stress: Not on file   Social Connections: Not on file   Interpersonal Safety: Not on file   Housing Stability: Not on file       Family History   Problem Relation Age of Onset     Diabetes Type 2  Maternal Grandmother      Diabetes Type 2  Paternal Grandmother      Breast Cancer Paternal Grandmother      Cerebrovascular Disease Father 53     Myocardial Infarction No family hx of      Coronary Artery Disease Early Onset No family hx of      Ovarian Cancer No family hx of      Colon Cancer No family hx of      Depression Mother      Anxiety Disorder Mother      Family history reviewed and edited as appropriate    Medications and Allergies:     Outpatient Encounter Medications as of 10/31/2023   Medication Sig Dispense Refill     albuterol (PROAIR  HFA/PROVENTIL HFA/VENTOLIN HFA) 108 (90 Base) MCG/ACT inhaler Inhale 2 puffs into the lungs every 6 hours as needed for shortness of breath / dyspnea or wheezing 18 g 0     albuterol (PROVENTIL) (2.5 MG/3ML) 0.083% neb solution Take 2.5 mg by nebulization every 6 hours as needed for shortness of breath or wheezing       [place] Saline Nasal No-Drip GEL Spray 1 Application in nostril daily Apply into each nare 2 times daily Place in front of each side of your nose and breathe in to distribute gel daily. - Each Nare 22 mL 11     BANOPHEN 2-0.1 % external cream Apply 1 applicator topically 2 times daily as needed for itching       brexpiprazole (REXULTI) 1 MG tablet Take 1 mg by mouth at bedtime       cetirizine (ZYRTEC) 10 MG tablet Take 1 tablet (10 mg) by mouth daily 30 tablet 0     Cholecalciferol (D3 HIGH POTENCY) 25 MCG (1000 UT) CAPS Take 50 mcg by mouth daily       clonazePAM (KLONOPIN) 0.5 MG tablet Take 0.5mg daily PRN anxiety- 20 tablets per month, try to keep it to 3-4x per week       cyclobenzaprine (FLEXERIL) 10 MG tablet Take 1 tablet (10 mg) by mouth 3 times daily as needed for muscle spasms 20 tablet 0     ferrous sulfate (FEROSUL) 325 (65 Fe) MG tablet Take 1 tablet (325 mg) by mouth daily (with breakfast) 30 tablet 0     fluocinonide (LIDEX) 0.05 % external cream Apply 1 Application topically 2 times daily as needed Apply thinly to knee 2 times daily, Monday through Fridays, off on weekends as needed. Avoid face and skin folds.       furosemide (LASIX) 20 MG tablet Take 20 mg by mouth daily       hydroxychloroquine (PLAQUENIL) 200 MG tablet Take 200 mg by mouth daily       Lidocaine (LIDOCARE) 4 % Patch Place 1 patch onto the skin every 24 hours To prevent lidocaine toxicity, patient should be patch free for 12 hrs daily. 10 patch 0     metFORMIN (GLUCOPHAGE XR) 500 MG 24 hr tablet Take 1,000 mg by mouth daily (with breakfast)       montelukast (SINGULAIR) 10 MG tablet Take 10 mg by mouth every evening        nabumetone (RELAFEN) 750 MG tablet Take 750 mg by mouth 2 times daily       norethindrone (AYGESTIN) 5 MG tablet Take 5 mg by mouth daily       Nutritional Supplements (ENSURE MAX PROTEIN) LIQD Take 240 mLs by mouth daily 7200 mL 11     omeprazole (PRILOSEC) 40 MG DR capsule Take 1 capsule (40 mg) by mouth daily 90 capsule 3     ondansetron (ZOFRAN ODT) 4 MG ODT tab Take 1 tablet (4 mg) by mouth every 8 hours as needed for nausea 10 tablet 0     ondansetron (ZOFRAN-ODT) 4 MG ODT tab Take 1 tablet (4 mg) by mouth every 8 hours as needed for nausea 15 tablet 0     pregabalin (LYRICA) 100 MG capsule Take 100 mg by mouth every morning       pregabalin (LYRICA) 100 MG capsule Take 200 mg by mouth at bedtime       Respiratory Therapy Supplies (NEBULIZER) BRENDAN Nebulizer device.  Albuterol nebulization every 2 hours as needed for shortness of breath or wheezing. 1 each 0     Semaglutide (RYBELSUS) 14 MG tablet Take 14 mg by mouth daily 90 tablet 3     Semaglutide-Weight Management (WEGOVY) 0.5 MG/0.5ML pen Inject 0.5 mg Subcutaneous every 7 days 2 mL 1     sodium chloride (OCEAN) 0.65 % nasal spray Spray 2 sprays in each side of the nose every 3 hours while awake. 44 mL 11     SUMAtriptan (IMITREX) 25 MG tablet Take 25 mg by mouth at onset of headache for migraine May repeat in 2 hours.       valACYclovir (VALTREX) 1000 mg tablet Take 2,000 mg by mouth 2 times daily as needed Take 2 tablets by mouth two times daily for one day. Use as needed at onset of cold sore.       Facility-Administered Encounter Medications as of 10/31/2023   Medication Dose Route Frequency Provider Last Rate Last Admin     [COMPLETED] acetaminophen (TYLENOL) tablet 1,000 mg  1,000 mg Oral Once Kamar Cosme MD   1,000 mg at 10/31/23 0008     [COMPLETED] acetaminophen (TYLENOL) tablet 650 mg  650 mg Oral Once Bib Doherty MD   650 mg at 10/31/23 0508     [COMPLETED] alum & mag hydroxide-simethicone (MAALOX) suspension 15 mL  15 mL Oral  Kamar Bermeo MD   15 mL at 10/31/23 0137        Allergies   Allergen Reactions     Amoxicillin-Pot Clavulanate Other (See Comments), Swelling and Rash     PN: facial swelling, left side. Also had cortisone injection the same day as she started the Augmentin.  Noted in downtime recovery of chart.    PN: facial swelling, left side. Also had cortisone injection the same day as she started the Augmentin.; HUT Comment: PN: facial swelling, left side. Also had cortisone injection the same day as she started the Augmentin.Noted in downtime recovery of chart.; HUT Reaction: Rash; HUT Reaction: Unknown; HUT Reaction Type: Allergy; HUT Severity: Med; HUT Noted: [id number]  PN: facial swelling, left side. Also had cortisone injection the same day as she started the Augmentin.  Other reaction(s): *Unknown  PN: facial swelling, left side. Also had cortisone injection the same day as she started the Augmentin.  Noted in downtime recovery of chart.  PN: facial swelling, left side. Also had cortisone injection the same day as she started the Augmentin.  Other reaction(s): Facial swelling  Other reaction(s): Facial swelling     Hydrocodone Nausea and Vomiting and GI Disturbance     vomiting for days, PN: vomiting for days; HUT Comment: vomiting for days; HUT Reaction: Gastrointestinal; HUT Reaction: Nausea And Vomiting; HUT Reaction Type: Intolerance; HUT Severity: Med; HUT Noted: [id number]  vomiting for days    Other reaction(s): Rash     Hydrocodone-Acetaminophen Nausea and Vomiting and Rash     Update on 12/12  Pt says she can take tylenol just not the hydrocodone.   Other reaction(s): Rash       Influenza Vaccines Other (See Comments) and Nausea and Vomiting     HUT Reaction: Nausea And Vomiting; HUT Reaction Type: Intolerance; HUT Severity: Low; HUT Noted: [id number]     Latex Rash     HUT Reaction: Rash; HUT Reaction Type: Allergy; HUT Severity: Low; HUT Noted: [id number]  Other reaction(s): Rash       Oseltamivir Hives      med stopped, PN: med stopped  med stopped, PN: med stopped; HUT Comment: med stopped, PN: med stopped; HUT Reaction: Hives; HUT Reaction Type: Allergy; HUT Severity: Med; HUT Noted: [id number]     Penicillins Anaphylaxis     HUT Reaction: Anaphylaxis; HUT Reaction Type: Allergy; HUT Severity: High; HUT Noted: 20150904     Vancomycin Itching, Swelling and Rash     Other reaction(s): Redness  Flushed face, very itchy; HUT Comment: Flushed face, very itchy; HUT Reaction: Angioedema; HUT Reaction: Redness; HUT Severity: Med; HUT Noted: [id number]    facial     Blood-Group Specific Substance Other (See Comments)     Patient has an anti-Cw and non-specific antibodies. Blood product orders may be delayed. Draw one red top and two purple top tubes for all type/screen/crossmatch orders.  Patient has anti-Cw, anti-K (Angella), Warm auto and nonspecific antibodies. Blood products may be delayed. Draw patient 24 hours prior to transfusion. Draw one red top and two purple top tubes for all type and screen orders.     Clavulanic Acid Angioedema     Fentanyl Itching     Haemophilus B Polysaccharide Vaccine Nausea and Vomiting     Naltrexone Other (See Comments)     Reaction(s): Vivid dreams.     Other Drug Allergy (See Comments)      See original file MRN [id number]. Files are marked for merge     Oxycodone Swelling     Adhesive Tape Rash     Silicone type  Silicone type    Other reaction(s): adhesive allergy  Other reaction(s): adhesive allergy  Silicone type    Other reaction(s): adhesive allergy       Band-Aid Anti-Itch      Other reaction(s): adhesive allergy     Cephalosporins Rash     Lamotrigine Rash     Possibly associated with Lamictal.   HUT Comment: Possibly associated with Lamictal. ; HUT Reaction: Rash; HUT Reaction Type: Allergy; HUT Severity: Low; HUT Noted: [id number]     No Clinical Screening - See Comments Rash and Other (See Comments)     Silicone type  Silicone type  See original file MRN [id number]. Files are marked  for merge  History of swallowing sharp metallic objects. She should not be prescribed lancets due to posed risk of swallowing.         Review of systems:  A full 10 point review of systems was obtained and was negative except for the pertinent positives and negatives stated within the HPI.    Objective Findings:   Physical Exam:    Constitutional: LMP 10/09/2023   General: Alert, anxious appearing and tearful   Head: Atraumatic, normocephalic, no obvious abnormalities   Eyes: Sclera anicteric, no obvious conjunctival hemorrhage   Nose: Nares normal, no obvious malformation, no obvious rhinorrhea   Respiratory: Normal appearing respirations, no cough, no apparent distress  Musculoskeletal: Range of motion intact, no obvious strength deficit  Skin: No jaundice, no obvious rash  Neurologic: AAOx3, no obvious neurologic abnormality.   Psychiatric: Normal Affect, appropriate mood  Extremities: No obvious edema, no obvious malformation     Labs, Radiology, Pathology     Lab Results   Component Value Date    WBC 8.0 10/29/2023    WBC 6.8 10/02/2023    WBC 8.8 09/23/2023    HGB 14.0 10/29/2023    HGB 13.6 10/02/2023    HGB 14.3 09/23/2023     10/29/2023     10/02/2023     09/23/2023    CHOL 132 02/11/2022    CHOL 130 11/30/2020    CHOL 132 03/21/2018    TRIG 266 (H) 02/11/2022    TRIG 182 (H) 11/30/2020    TRIG 125 03/21/2018    HDL 41 (L) 02/11/2022    HDL 44 (L) 11/30/2020    HDL 48 (L) 03/21/2018    ALT 24 10/02/2023    ALT 19 09/15/2023    ALT 34 05/28/2023    AST 26 10/02/2023    AST 20 09/15/2023    AST 20 05/28/2023     10/29/2023     10/02/2023     09/23/2023    BUN 9.1 10/29/2023    BUN 10.6 10/02/2023    BUN 8.8 09/23/2023    CO2 24 10/29/2023    CO2 24 10/02/2023    CO2 21 (L) 09/23/2023    TSH 4.47 (H) 06/27/2023    TSH 4.94 (H) 02/11/2022    TSH 3.19 11/30/2020    INR 1.11 01/15/2023    INR 1.06 12/28/2022    INR 1.03 10/15/2022        Liver Function Studies -   Recent  Labs   Lab Test 01/05/23  1905   PROTTOTAL 6.6   ALBUMIN 3.6   BILITOTAL 0.2   ALKPHOS 70   AST 24   ALT 30          Patient Active Problem List    Diagnosis Date Noted     Right leg paresthesias 05/24/2023     Priority: Medium     Right leg weakness 05/24/2023     Priority: Medium     Right low back pain, unspecified chronicity, unspecified whether sciatica present 05/24/2023     Priority: Medium     Foot drop, left 05/24/2023     Priority: Medium     Diarrhea, unspecified type 01/30/2023     Priority: Medium     Muscle strain of thigh, right, initial encounter 01/11/2023     Priority: Medium     Foreign body ingestion 09/16/2022     Priority: Medium     Foreign body ingestion, initial encounter 02/10/2022     Priority: Medium     Suicide gesture, subsequent encounter (H24) 11/27/2020     Priority: Medium     Gallstones 10/30/2020     Priority: Medium     RUQ abdominal pain 10/30/2020     Priority: Medium     Swallowed foreign body, initial encounter 01/12/2020     Priority: Medium     Swallowed foreign body, initial encounter 01/12/2020     Priority: Medium     Added automatically from request for surgery [id number]       Foreign body in digestive system 12/18/2019     Priority: Medium     Pulmonary embolism (H) 11/30/2019     Priority: Medium     Esophageal stricture 11/30/2019     Priority: Medium     Added automatically from request for surgery [id number]       Poor appetite 11/29/2019     Priority: Medium     High risk medication use 11/05/2019     Priority: Medium     History of posttraumatic stress disorder (PTSD) 11/05/2019     Priority: Medium     Dysphagia 11/03/2019     Priority: Medium     Esophageal perforation 10/24/2019     Priority: Medium     Added automatically from request for surgery [id number]       Esophageal tear, sequela 10/19/2019     Priority: Medium     Other constipation 10/17/2019     Priority: Medium     Epigastric pain 10/15/2019     Priority: Medium     Polyneuropathy 09/24/2019     Priority:  Medium     Overview:   11/9/1109-Gritq-NSN generalized sensorimotor peripheral neuropathy RUE and RLE worst  ? Hereditary peripheral neuropathy    Demyelinating polyneuropathy. Managed by neurologist at [facility].       S/P laparoscopy 09/21/2019     Priority: Medium     Balance problem 08/30/2019     Priority: Medium     Limited mobility 08/30/2019     Priority: Medium     Rectal pain 08/24/2019     Priority: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

## 2023-10-31 NOTE — ED NOTES
Poison control satff called and updated her present status-patient had her snacks,complaint of pain,given oral anti emetic and tylenol.  Informed MD about recommendation of medicine level -no need for now accordingly.  Stable to go to [facility].
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
## 2023-10-31 NOTE — ED NOTES
Bed: ED30  Expected date: 10/30/23  Expected time: 9:35 PM  Means of arrival: Ambulance  Comments:  [facility] 32F gh pt; swallowed AAA battery

## 2023-10-31 NOTE — NURSING NOTE
Is the patient currently in the state of [place]? YES    Visit mode:VIDEO    If the visit is dropped, the patient can be reconnected by: VIDEO VISIT: Send to e-mail at: [email or web address]    Will anyone else be joining the visit? NO  (If patient encounters technical issues they should call [phone]    How would you like to obtain your AVS? MyChart    Are changes needed to the allergy or medication list? Pt stated no changes to allergies and Pt stated no med changes    Reason for visit: ARMIDA ANTONIO

## 2023-10-31 NOTE — ED TRIAGE NOTES
[facility] [address] Pt reports an increase in thoughts of self-harm since an appointment with a provider a couple weeks ago regarding scheduling an upper endoscopy to stretch the pt's esophagus. Pt seen yesterday for having swallowed a wire, here today for swallowing a triple A battery. Pt reports having thoughts of self harm with no plan, would like to be seen by DEC.      Triage Assessment (Adult)       Row Name 10/30/23 3855          Triage Assessment    Airway WDL WDL        Respiratory WDL    Respiratory WDL WDL        Skin Circulation/Temperature WDL    Skin Circulation/Temperature WDL WDL        Cardiac WDL    Cardiac WDL WDL        Peripheral/Neurovascular WDL    Peripheral Neurovascular WDL WDL        Cognitive/Neuro/Behavioral WDL    Cognitive/Neuro/Behavioral WDL WDL

## 2023-11-01 NOTE — PATIENT INSTRUCTIONS
It was a pleasure taking care of you today.  I've included a brief summary of our discussion and care plan from today's visit below.  Please review this information with your primary care provider.  _______________________________________________________________________    My recommendations are summarized as follows:    - Consultation to Carli Perea behavioral health psychologist   - Follow up with PCP/consider psychiatric consultation   - Emergent evaluation for removal of foreign body please consider admission for appropriate psychiatric cares/intensive out patient therapy for on going intrusive thoughts and history of borderline personality disorder   - Upper endoscopy with empiric dilation scheduled for 1/23/2024, only to be performed in absence of a foreign body for possibly stricturing disease. Will need to be completed in the hospital setting with a 60 minute time slot.   - Continue Omeprazole 40mg once daily 30-60 minutes before breakfast.     To schedule endoscopic procedures you may call: [phone]  To schedule radiology (imaging) tests you may call: [phone]  To schedule an ENT appointment you may call: [phone]    Please call my nurse Arianna ([phone]), Roberta ([phone]) with any questions or concerns.      Return to GI Clinic in 3 months to review your progress.    _______________________________________________________________________    Who do I call with any questions after my visit?  Please be in touch if there are any further questions that arise following today's visit.  There are multiple ways to contact your gastroenterology care team.      During business hours, you may reach a Gastroenterology nurse at [phone] and choose option 3.       To schedule or reschedule an appointment, please call [phone].     You can always send a secure message through [employer].  MyChart messages are answered by your nurse or doctor typically within 24 hours.  Please allow extra time on  weekends and holidays.      For urgent/emergent questions after business hours, you may reach the on-call GI Fellow by contacting the [facility]  at [phone].     How will I get the results of any tests ordered?    You will receive all of your results.  If you have signed up for [employer], any tests ordered at your visit will be available to you after your physician reviews them.  Typically this takes 1-2 weeks.  If there are urgent results that require a change in your care plan, your physician or nurse will call you to discuss the next steps.      What is [employer]?  [employer] is a secure way for you to access all of your healthcare records from the [facility].  It is a web based computer program, so you can sign on to it from any location.  It also allows you to send secure messages to your care team.  I recommend signing up for [employer] access if you have not already done so and are comfortable with using a computer.      How to I schedule a follow-up visit?  If you did not schedule a follow-up visit today, please call [phone] to schedule a follow-up office visit.      If you feel you received exceptional care and are interested in supporting the clinical and research goals of Carli Potter PA-C or the Division of Gastroenterology, Hepatology, and Nutrition please contact [email or web address] from the [facility] to discuss opportunities to donate.    Sincerely,    Carli Potter PA-C  Division of Gastroenterology, Hepatology, and Nutrition  [facility]

## 2023-11-05 ENCOUNTER — HOSPITAL ENCOUNTER (EMERGENCY)
Facility: CLINIC | Age: 33
Discharge: HOME | End: 2023-11-06
Attending: EMERGENCY MEDICINE | Admitting: EMERGENCY MEDICINE
Payer: COMMERCIAL

## 2023-11-05 DIAGNOSIS — R11.2 NAUSEA AND VOMITING, UNSPECIFIED VOMITING TYPE: ICD-10-CM

## 2023-11-05 LAB
ALBUMIN SERPL BCG-MCNC: 4 G/DL (ref 3.5–5.2)
ALP SERPL-CCNC: 67 U/L (ref 35–104)
ALT SERPL W P-5'-P-CCNC: 23 U/L (ref 0–50)
ANION GAP SERPL CALCULATED.3IONS-SCNC: 11 MMOL/L (ref 7–15)
AST SERPL W P-5'-P-CCNC: 14 U/L (ref 0–45)
BASOPHILS # BLD AUTO: 0 10E3/UL (ref 0–0.2)
BASOPHILS NFR BLD AUTO: 0 %
BILIRUB SERPL-MCNC: 0.2 MG/DL
BUN SERPL-MCNC: 8.2 MG/DL (ref 6–20)
CALCIUM SERPL-MCNC: 8.8 MG/DL (ref 8.6–10)
CHLORIDE SERPL-SCNC: 107 MMOL/L (ref 98–107)
CREAT SERPL-MCNC: 0.59 MG/DL (ref 0.51–0.95)
DEPRECATED HCO3 PLAS-SCNC: 23 MMOL/L (ref 22–29)
EGFRCR SERPLBLD CKD-EPI 2021: >90 ML/MIN/1.73M2
EOSINOPHIL # BLD AUTO: 0.1 10E3/UL (ref 0–0.7)
EOSINOPHIL NFR BLD AUTO: 2 %
ERYTHROCYTE [DISTWIDTH] IN BLOOD BY AUTOMATED COUNT: 13.3 % (ref 10–15)
GLUCOSE SERPL-MCNC: 101 MG/DL (ref 70–99)
HCG SERPL QL: NEGATIVE
HCT VFR BLD AUTO: 36.8 % (ref 35–47)
HGB BLD-MCNC: 12.2 G/DL (ref 11.7–15.7)
HOLD SPECIMEN: NORMAL
IMM GRANULOCYTES # BLD: 0 10E3/UL
IMM GRANULOCYTES NFR BLD: 0 %
LIPASE SERPL-CCNC: 35 U/L (ref 13–60)
LYMPHOCYTES # BLD AUTO: 1.9 10E3/UL (ref 0.8–5.3)
LYMPHOCYTES NFR BLD AUTO: 30 %
MCH RBC QN AUTO: 29.8 PG (ref 26.5–33)
MCHC RBC AUTO-ENTMCNC: 33.2 G/DL (ref 31.5–36.5)
MCV RBC AUTO: 90 FL (ref 78–100)
MONOCYTES # BLD AUTO: 0.5 10E3/UL (ref 0–1.3)
MONOCYTES NFR BLD AUTO: 8 %
NEUTROPHILS # BLD AUTO: 3.8 10E3/UL (ref 1.6–8.3)
NEUTROPHILS NFR BLD AUTO: 60 %
NRBC # BLD AUTO: 0 10E3/UL
NRBC BLD AUTO-RTO: 0 /100
PLATELET # BLD AUTO: 254 10E3/UL (ref 150–450)
POTASSIUM SERPL-SCNC: 3.3 MMOL/L (ref 3.4–5.3)
PROT SERPL-MCNC: 6.3 G/DL (ref 6.4–8.3)
RBC # BLD AUTO: 4.1 10E6/UL (ref 3.8–5.2)
SODIUM SERPL-SCNC: 141 MMOL/L (ref 135–145)
WBC # BLD AUTO: 6.3 10E3/UL (ref 4–11)

## 2023-11-05 PROCEDURE — 84703 CHORIONIC GONADOTROPIN ASSAY: CPT | Performed by: EMERGENCY MEDICINE

## 2023-11-05 PROCEDURE — 85025 COMPLETE CBC W/AUTO DIFF WBC: CPT | Performed by: EMERGENCY MEDICINE

## 2023-11-05 PROCEDURE — 99284 EMERGENCY DEPT VISIT MOD MDM: CPT | Mod: 25

## 2023-11-05 PROCEDURE — 80053 COMPREHEN METABOLIC PANEL: CPT | Performed by: EMERGENCY MEDICINE

## 2023-11-05 PROCEDURE — 96374 THER/PROPH/DIAG INJ IV PUSH: CPT

## 2023-11-05 PROCEDURE — 83690 ASSAY OF LIPASE: CPT | Performed by: EMERGENCY MEDICINE

## 2023-11-05 PROCEDURE — 250N000011 HC RX IP 250 OP 636: Mod: JZ | Performed by: EMERGENCY MEDICINE

## 2023-11-05 PROCEDURE — 96375 TX/PRO/DX INJ NEW DRUG ADDON: CPT

## 2023-11-05 PROCEDURE — 96361 HYDRATE IV INFUSION ADD-ON: CPT

## 2023-11-05 PROCEDURE — 36415 COLL VENOUS BLD VENIPUNCTURE: CPT | Performed by: EMERGENCY MEDICINE

## 2023-11-05 PROCEDURE — 258N000003 HC RX IP 258 OP 636: Performed by: EMERGENCY MEDICINE

## 2023-11-05 RX ORDER — DIPHENHYDRAMINE HYDROCHLORIDE 50 MG/ML
25 INJECTION INTRAMUSCULAR; INTRAVENOUS ONCE
Status: COMPLETED | OUTPATIENT
Start: 2023-11-05 | End: 2023-11-05

## 2023-11-05 RX ORDER — ONDANSETRON 2 MG/ML
4 INJECTION INTRAMUSCULAR; INTRAVENOUS ONCE
Status: COMPLETED | OUTPATIENT
Start: 2023-11-05 | End: 2023-11-05

## 2023-11-05 RX ORDER — METOCLOPRAMIDE HYDROCHLORIDE 5 MG/ML
10 INJECTION INTRAMUSCULAR; INTRAVENOUS ONCE
Status: COMPLETED | OUTPATIENT
Start: 2023-11-05 | End: 2023-11-05

## 2023-11-05 RX ADMIN — METOCLOPRAMIDE 10 MG: 5 INJECTION, SOLUTION INTRAMUSCULAR; INTRAVENOUS at 20:18

## 2023-11-05 RX ADMIN — DIPHENHYDRAMINE HYDROCHLORIDE 25 MG: 50 INJECTION, SOLUTION INTRAMUSCULAR; INTRAVENOUS at 20:17

## 2023-11-05 RX ADMIN — ONDANSETRON 4 MG: 2 INJECTION INTRAMUSCULAR; INTRAVENOUS at 22:18

## 2023-11-05 RX ADMIN — SODIUM CHLORIDE 1000 ML: 9 INJECTION, SOLUTION INTRAVENOUS at 20:14

## 2023-11-05 ASSESSMENT — ACTIVITIES OF DAILY LIVING (ADL)
ADLS_ACUITY_SCORE: 40
ADLS_ACUITY_SCORE: 40

## 2023-11-06 VITALS
HEIGHT: 62 IN | TEMPERATURE: 99 F | DIASTOLIC BLOOD PRESSURE: 56 MMHG | SYSTOLIC BLOOD PRESSURE: 102 MMHG | WEIGHT: 280 LBS | HEART RATE: 75 BPM | BODY MASS INDEX: 51.53 KG/M2 | RESPIRATION RATE: 16 BRPM | OXYGEN SATURATION: 96 %

## 2023-11-06 ASSESSMENT — ACTIVITIES OF DAILY LIVING (ADL): ADLS_ACUITY_SCORE: 40

## 2023-11-06 NOTE — ED NOTES
Bed: ED07  Expected date:   Expected time:   Means of arrival:   Comments:  [facility] 32F abdominal pain
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
## 2023-11-06 NOTE — ED TRIAGE NOTES
BIBA from  the [facility]. Per EMS pt c/o  nausea, vomiting and diarrhea and abdomen pain  x 2 days. Today, pt developed nausea and had x 4 emesis. Pt denied blood in vomitus, fever or chills and is unable to keep  solids/liquids  down. VSS, . Hx: Discharged form the hospital days ago after ingesting x 2 batteries.

## 2023-11-06 NOTE — DISCHARGE INSTRUCTIONS
Continue home nausea meds.  Very bland diet.  I would try to keep something small in your stomach to help settle it.  Only ingest food and fluids intended for drinking  Return with worsening symptoms or new concerns.  More importantly, follow-up with your primary care provider.

## 2023-11-07 ENCOUNTER — TELEPHONE (OUTPATIENT)
Dept: GASTROENTEROLOGY | Facility: CLINIC | Age: 33
End: 2023-11-07
Payer: COMMERCIAL

## 2023-11-07 DIAGNOSIS — Z87.821: ICD-10-CM

## 2023-11-07 DIAGNOSIS — K21.9 GASTROESOPHAGEAL REFLUX DISEASE, UNSPECIFIED WHETHER ESOPHAGITIS PRESENT: Primary | ICD-10-CM

## 2023-11-07 RX ORDER — SUCRALFATE ORAL 1 G/10ML
1 SUSPENSION ORAL 4 TIMES DAILY
Qty: 400 ML | Refills: 0 | Status: SHIPPED | OUTPATIENT
Start: 2023-11-07 | End: 2024-01-05

## 2023-11-07 RX ORDER — OMEPRAZOLE 40 MG/1
40 CAPSULE, DELAYED RELEASE ORAL
Qty: 180 CAPSULE | Refills: 3 | Status: SHIPPED | OUTPATIENT
Start: 2023-11-07 | End: 2024-01-10

## 2023-11-07 NOTE — TELEPHONE ENCOUNTER
Spoke with provider, recommended KUB, carafate 4x PRN for 10 days, 40 mg omeprazole BID.      Updated pt on plan. Pt will be seeing her PCP late this week and would like the x-ray orders sent to that clinic so she can get that done at the time of the visit. Confirmed pharmacy pt would like medications sent to and dosing. Encouraged pt to eat a bland diet and push fluids to maintain hydration. Educated pt on red flag symptoms in which pt should seek emergency care including, unable to keep down food or liquids, increased or severe pain that is unable to be managed at home, or if pt ingests any foreign object.      [facility] send X-ray orders - Dr. Knight later this week

## 2023-11-07 NOTE — TELEPHONE ENCOUNTER
"Rec'd VM from pt that she would like a follow up visit with Carli Greenberg to further discuss her symptoms as her last visit was cut short. \"Severe abdominal pain every time I eat and I can't get a lot down.\"      Spoke with pt, she was very tearful, per pt she has been \"throwing up my food or severe abdominal pain for the last few weeks.\" Pt has been treating with Tylenol, Zofran and Reglan which at this point have not helped with her symptoms. Pt was recently seen in the ED and was giving IV fluids, anti-emetics, and IV benadryl which per pt did not help with her symptoms.     Pt reports in the morning she has mild abdominal pain, however as soon as she eats the pain becomes severe. Pt has been vomiting after eating and feels nauseous \"as soon as I take a bite\" regardless of what pt eats she feels the nausea and abdominal pain.     Currently is able to keep down water and occasionally ginger ale without vomiting. Vomiting has become worse since Sunday. Today pt reported she had her last day of group and they celebrated with pizza and she became nauseous after eating and proceeded to vomit. Pt currently had prescriptions for zofran and reglan at home and they have not been helping.    Pt denies any recent ingestion of foreign objects since 10/31 of batteries and magnets.     Discussed Carli's recommendations from last visit which was cut short due to ingestion of foreign objects. Reviewed plan with pt, however pt is still concerned due to her abdominal pain as to next steps in plan of care. The writer will discuss with provider for recommendations. Encouraged pt to make appointment with PCP as that was also part of the recommendation from Carli's appointment with pt on 10/31.  "

## 2023-11-08 ENCOUNTER — TELEPHONE (OUTPATIENT)
Dept: GASTROENTEROLOGY | Facility: CLINIC | Age: 33
End: 2023-11-08
Payer: COMMERCIAL

## 2023-11-08 DIAGNOSIS — T18.9XXS SWALLOWED FOREIGN BODY, SEQUELA: ICD-10-CM

## 2023-11-08 DIAGNOSIS — Z87.821: Primary | ICD-10-CM

## 2023-11-08 NOTE — TELEPHONE ENCOUNTER
----- Message from Doreen Willis LPN sent at 11/7/2023  2:45 PM CST -----  Regarding: FW: Fax x-ray orders to [facility]    ----- Message -----  From: Roberta Lozano RN  Sent: 11/7/2023   2:45 PM CST  To: [facility] Support Pool  Subject: Fax x-ray orders to [facility],    Can you please help with faxing the x-ray orders Carli CASTANO just ordered for pt to her [facility] clinic? I'm not sure if we need to fax the orders to her PCP or if they have a radiology fax number. Pt has an appointment with PCP later this week and would like to complete the x-ray order when she is there for her appointment.    [facility]  [address], [place] [address]  (PCP Fax: [phone])    Thank you!  Roberta

## 2023-11-08 NOTE — TELEPHONE ENCOUNTER
7:06 am: Rec'd VM from pt with questions regarding medications and referral to Carli Echeverria.    11:46 am: Spoke with pt regarding questions on carafate and timing with meals and being out of her group home. Discussed with pt it is as needed and she can take it up to 4x a day. Pt also reported she received a letter that they are unable to process her referral to Carli Echeverria as there are issues as to what the provider requested in the referral. Pt also had question regarding coverage for visits with Carli Echeverria and encouraged pt to reach out to her insurance for clarification. Updated provider.

## 2023-11-08 NOTE — TELEPHONE ENCOUNTER
XR abdomen ordered by Carli Potter faxed to [facility] @ [phone].      Patient stated she attempted to scheduled but did not have order. Re-faxed to [facility] at [phone].           Called [facility] and spoke to Rhea to verify they got the x-ray order. She was unable to locate order. She attempted to call department to check with them. She was not able to reach them. She is sending a message for them to contact writer. Writers callback number given.    Spoke to Caroline and she stated they have the order. Patient is not yet scheduled.    Doreen Willis LPN

## 2023-11-11 ENCOUNTER — HOSPITAL ENCOUNTER (EMERGENCY)
Facility: CLINIC | Age: 33
Discharge: NURSING, REHAB OR LONG-TERM CARE | End: 2023-11-11
Attending: STUDENT IN AN ORGANIZED HEALTH CARE EDUCATION/TRAINING PROGRAM | Admitting: STUDENT IN AN ORGANIZED HEALTH CARE EDUCATION/TRAINING PROGRAM
Payer: COMMERCIAL

## 2023-11-11 ENCOUNTER — APPOINTMENT (OUTPATIENT)
Dept: GENERAL RADIOLOGY | Facility: CLINIC | Age: 33
End: 2023-11-11
Attending: STUDENT IN AN ORGANIZED HEALTH CARE EDUCATION/TRAINING PROGRAM
Payer: COMMERCIAL

## 2023-11-11 VITALS
HEART RATE: 90 BPM | SYSTOLIC BLOOD PRESSURE: 142 MMHG | DIASTOLIC BLOOD PRESSURE: 74 MMHG | RESPIRATION RATE: 16 BRPM | TEMPERATURE: 98.4 F | OXYGEN SATURATION: 98 %

## 2023-11-11 DIAGNOSIS — M25.562 ACUTE PAIN OF LEFT KNEE: ICD-10-CM

## 2023-11-11 DIAGNOSIS — M25.521 RIGHT ELBOW PAIN: ICD-10-CM
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
## 2023-11-11 PROCEDURE — 73110 X-RAY EXAM OF WRIST: CPT | Mod: RT

## 2023-11-11 PROCEDURE — 99284 EMERGENCY DEPT VISIT MOD MDM: CPT

## 2023-11-11 PROCEDURE — 73080 X-RAY EXAM OF ELBOW: CPT | Mod: RT

## 2023-11-11 PROCEDURE — 73562 X-RAY EXAM OF KNEE 3: CPT | Mod: LT

## 2023-11-11 PROCEDURE — 250N000013 HC RX MED GY IP 250 OP 250 PS 637: Performed by: STUDENT IN AN ORGANIZED HEALTH CARE EDUCATION/TRAINING PROGRAM

## 2023-11-11 RX ORDER — ACETAMINOPHEN 500 MG
500 TABLET ORAL ONCE
Status: COMPLETED | OUTPATIENT
Start: 2023-11-11 | End: 2023-11-11

## 2023-11-11 RX ADMIN — ACETAMINOPHEN 500 MG: 500 TABLET, FILM COATED ORAL at 18:33

## 2023-11-11 ASSESSMENT — ACTIVITIES OF DAILY LIVING (ADL): ADLS_ACUITY_SCORE: 38

## 2023-11-11 NOTE — ED PROVIDER NOTES
History   Chief Complaint:  Fall and Arm Injury       HPI:  Nevin Alvarado is a pleasant 33 year old female presenting for evaluation of an arm injury and left knee pain after a fall. The patient reports that at 1630 she was taking a nap when she tripped while trying to get out of her bed. She explains that she landed on her right elbow and also injured her left knee from the fall. The patient denies hitting her head or passing out. Nevin endorses pain in her right elbow, left knee, and right wrist but not in her right shoulder. The patient notes that she usually wears a brace but did not have it on because she was sleeping.      Independent Historian:  None. Only the patient provided history.    Review of External Notes:  None.    I personally reviewed the patient's chart, including available medication list and available past medical history, past surgical history, family history, and social history.    Physical Exam   Patient Vitals for the past 24 hrs:   BP Temp Temp src Pulse Resp SpO2   11/11/23 1739 (!) 142/74 98.4  F (36.9  C) Temporal 90 16 98 %      Physical Exam  Vitals and nursing note reviewed.   Constitutional:       Appearance: She is obese.   Cardiovascular:      Pulses: Normal pulses.   Musculoskeletal:         General: Tenderness (Tenderness over left elbow, medial and lateral epicondyles and coronoid.  Tenderness to palpation over the left patella and left knee medial joint line.  No tenderness to palpation over left ankle, left wrist or left shoulder.) present. No swelling or deformity.   Skin:     General: Skin is warm and dry.      Capillary Refill: Capillary refill takes less than 2 seconds.      Findings: No bruising or erythema.   Neurological:      Mental Status: She is alert.      Sensory: No sensory deficit.      Motor: No weakness.            Emergency Department Course     Imaging & ECG: Laboratory:   No ECG performed.    XR Knee Left 3 Views   Final Result   IMPRESSION: Normal  joint spaces and alignment. No fracture or joint effusion.      XR Wrist Right G/E 3 Views   Final Result   IMPRESSION: Normal joint spaces and alignment. No fracture.      Elbow XR, G/E 3 views, right   Final Result   IMPRESSION: Normal joint spaces and alignment. No fracture or joint effusion.         Report per radiology. Labs Ordered and Resulted from Time of ED Arrival to Time of ED Departure - No data to display      Procedures  None performed    Interventions & Assessments:      Interventions:  Medications   acetaminophen (TYLENOL) tablet 500 mg (500 mg Oral $Given 11/11/23 3703)        Assessments:  1800 I obtained history and performed initial assessment of the patient.   1955 I reassessed the patient, discussed findings and plan of care.    Independent Interpretation (X-rays, CTs, rhythm strip):  I independently interpreted the patient's left knee x-ray; reassuring against fracture or dislocation.   I independently interpreted the patient's right wrist x-ray; reassuring against fracture or dislocation.   I independently interpreted the patient's right elbow x-ray; reassuring against fracture or dislocation.     Consultations/Discussion of Management or Tests:  None    Social Determinants of Health affecting care:   None.     Disposition:  The patient was discharged to home.     Impression & Plan        Medical Decision Making:   Patient presenting with fall and pain to right upper extremity and left knee.  Considered differential including fracture, dislocation, sprain versus strain, other.  Obtained plain films and these were reassuring with no evidence of fracture or dislocation.  Recommended patient be discharged with continuing symptomatic management with acetaminophen as needed and follow-up with PCP for reevaluation in 1 week if patient is having continuing symptoms. Findings were discussed.   Additional verbal instructions were provided.   I discussed specific warning signs and instructed the  patient to return to the emergency department if there are any concerns.  Understanding of instructions was voiced, questions were answered and the patient was discharged.        Diagnosis:    ICD-10-CM    1. Fall from slip, trip, or stumble, initial encounter  W01.0XXA       2. Right elbow pain  M25.521       3. Acute pain of left knee  M25.562               Bartolo Mckeon MD  11/11/23 8562

## 2023-11-11 NOTE — ED NOTES
Bed: ED11  Expected date:   Expected time:   Means of arrival:   Comments:  1833 66f fall, arm ETA 1732

## 2023-11-11 NOTE — ED TRIAGE NOTES
Pt has a care plan where she will swallow foreign objects or stick objects up her rectum. Today, pt comes from her  and sustained a mechanical fall from standing. Did not hit her head. C/o right arm pain and left knee hurts as she is compensating for right ACL/meniscus issues. + CMS

## 2023-11-16 NOTE — PROGRESS NOTES
"Video-Visit Details    Type of service:  Video Visit    Video Start Time: 1:58 PM  Video End Time: 2:22 PM    Originating Location (pt. Location): Home    Distant Location (provider location): Offsite (providers home) [facility] [place]     Platform used for Video Visit: [facility]    Return Bariatric Nutrition Consultation Note    Reason For Visit: Nutrition Assessment    Nevin Alvarado is a 33 year old presenting today for return bariatric nutrition consult.  Provided the weight loss surgery nutrition class information during this visit.  Pt is interested in laparoscopic sleeve gastrectomy in the future.  Patient is accompanied by self.  This is pt's 3rd nutrition visit prior to surgery.     Pt referred by Sharon Toro NP on September 12, 2023.  CO-MORBIDITIES OF OBESITY INCLUDE:        9/12/2023    12:48 PM   --   I have the following health issues associated with obesity Type II Diabetes    High Blood Pressure    Sleep Apnea    Polycystic Ovarian Syndrome    GERD (Reflux)    Fatty Liver    Asthma    Stress Incontinence     SUPPORT:      9/12/2023    12:48 PM   Support System Reviewed With Patient   Who do you have in your support network that can be available to help you for the first 2 weeks after surgery? I live in a group home with 24 hour staff, mom   Who can you count on for support throughout your weight loss surgery journey? a friend, and my therapist     ANTHROPOMETRICS:  Initial Consult Weight: 309 lb on 9/13/23  Estimated body mass index is 50.12 kg/m  as calculated from the following:    Height as of this encounter: 1.575 m (5' 2\").    Weight as of this encounter: 124.3 kg (274 lb).  Current Weight: 274 lb per pt report via Stitch.es, hasn't been to a clinic recently.     Required weight loss goal pre-op: -20 lbs from initial consult weight (goal weight 289 lbs or less before surgery)        9/12/2023    12:48 PM   --   I have tried the following methods to lose " weight Watching portions or calories    Exercise    Pre packaged meals ex: Nutrisystem    OTC Medications    Prescription Medications         9/12/2023    12:48 PM   Weight Loss Questions Reviewed With Patient   How long have you been overweight? Since late teens through early 20's     MEDICATION FOR WEIGHT LOSS:  topiramate- took in 2014 for mood- caused anorexia   Naltrexone - suicidal thoughts   Rybelsus - has been on since January 2023.     Hx of self harm- swallowing thing- started after she was treated for anorexia when taking topiramate- in 6 months has only had one day of self harm- this was 2 weeks ago and instead of swallowing she inserted something per rectum. - Followed by GI Carli Potter PA-C, PE in 2019 after esophageal perforation repair     VITAMIN/MINERAL SUPPLEMENTS:  Iron     NUTRITION HISTORY:  Food allergies:NKFA  Food intolerances: None  Previous experience with diet modification for weight loss: Nutrisystem, prescription medications, exercise, watching calories or portions     Has been meal planning for the past 3 months. Likes chicken, turkey, shrimp.      October 2023: Eating 3 meals per day. Lunch and dinner meals have been chili or mediterranean orzo salad with artichokes more recently. Drinks mostly water, Cup of coffee, Bubbler Beverages. Doesn't drink soda or juice. Uses a walker for neuropathy. Walking around more often/standing longer which has been an improvement.     November 2023: Reports she has been sick a lot recently. Sometimes feels she has to force herself to eat. Currently has a cold. Working with a GI doctor right now also as she has been having some GI symptoms after eating certain foods. Reports she had another self harm episode unfortunately, feels this will delay her chances for surgery.         9/12/2023    12:48 PM   Recall Diet Questions Reviewed With Patient   Describe what you typically consume for breakfast (typical or most recent) eggs and hash browns,  homemade waffles, laith pudding with fruit   Describe what you typically consume for lunch (typical or most recent) homemade lunchables, some pasta or chicken   Describe what you typically consume for supper (typical or most recent) low calorie meal prep meals   Describe what you typically consume as snacks (typical or most recent) cheese sticks, fruit jerky, fruits/vegetables   How many ounces of water, or other low calorie drinks, do you drink daily (8 oz=1 glass)? 48 oz   How many ounces of caffeine (coffee, tea, pop) do you drink daily (8 oz=1 glass)? 16 oz   How many ounces of carbonated (pop, beer, sparkling water) drinks do you drinky daily (8 oz=1 glass)? 0 oz   How many ounces of juice, pop, sweet tea, sports drinks, protein drinks, other sweetened drinks, do you drink daily (8 oz=1 glass)? 0 oz   How many ounces of milk do you drink daily (8 oz=1 glass) 0 oz   How often do you drink alcohol? Never           9/12/2023    12:48 PM   Eating Habits   What foods do you crave? ice cream, chocolate, sometimes just salty chips           9/12/2023    12:48 PM   Dining Out History Reviewed With Patient   How often do you dine out? Rarely.   Where do you dine out? (select all that apply) take out   What types of food do you order when you dine out? jitendra verdin     EXERCISE:        9/12/2023    12:48 PM   --   How often do you exercise? Less than 1 time per week   What is the duration of your exercise (in minutes)? 10 Minutes   What types of exercise do you do? other   What keeps you from being more active? Pain    My ability to walk or move around is limited    Shortness of breath    Too tired     NUTRITION DIAGNOSIS:  Obesity r/t long history of positive energy balance aeb BMI >30 kg/m2.    INTERVENTION:  Intervention Provided/Education Provided on post-op diet guidelines, vitamins/minerals essential post-operatively, GI anatomy of bariatric surgeries, ways to help prepare for post-op diet guidelines pre-operatively,  portion/calorie-control, mindful eating and sources of protein.  Patient demonstrates understanding.     Personal barriers to making and continuing required life changes have been identified, and strategies to overcome those barriers have been recommended AND family and social supports have been assessed and strategies to strengthen those supports have been recommended.    Provided pt with list of goals, RD contact information and resources listed below via Gigwalk.             9/12/2023    12:48 PM   Questions Reviewed With Patient   How ready are you to make changes regarding your weight? Number 1 = Not ready at all to make changes up to 10 = very ready. 10   How confident are you that you can change? 1 = Not confident that you will be successful making changes up to 10 = very confident. 7     Expected Engagement: good    GOALS:  Relating To Eating:  - Eat slowly (20-30 minutes per meal), chewing foods well (25 chews per bite/applesauce consistency)  - Focus on eating smaller portion sizes at meals and snacks  -Aim to consume 60 grams of protein each day (ex. 20 grams per meal)    Relating to beverages:  - Reduce caffeine/carbonation/calorie containing beverages  - Separate fluids from meals by 30 minutes before, during, and after eating  - Drink 48-64 ounces of fluid per day. Small, frequent sips between meals.    Relating to activity:  Increase activity as able    The Plate Method  [email or web address]    Protein Sources for Weight Loss  [email or web address]     Carbohydrates  [email or web address]     Mindful Eating  [email or web address]://[email or web address]     Summary of Volumetrics Eating Plan  [email or web address]     Diet Guidelines after Weight Loss Surgery  [email or web address]     Seated Exercises for Arms and Legs (can be done before or after surgery)  [email or web address]    Hunger Solutions:   Call the [employer] Semi Help Line at [phone] to talk  "with a specialist in community and government food assistance programs. They can help you sign-up for SNAP benefits, find food malone, food shelves and free food in your community and help refer you to any other community assistance programs that you qualify for.   https://www.hungersolutions.org/find-help/    Supplemental Nutrition Assistance Program (SNAP):  https://[place].gov/dhs/people-we-serve/adults/economic-assistance/food-nutrition/programs-and-services/supplemental-nutrition-assistance-program.jsp    [facility] Department:  To find a map of food shelves and food distribution pop-ups. Visit www.[facility]/foodshelves    Market [place] Program: Spend $10 of EBT, get $10 free at Bitbar.  To find map of farmers markets:  https://www.Anacor PharmaceuticalsoShare Your Brainions.org/programs/market-bucks/farmersmarkets/    SpendSmart,Eat Smart  Recipe ideas that are suppose to be more affordable  Calculator that allows you to compare product prices   https://spendsmart.extension.[facility]     Fare For All:  Provides discounted groceries. Up to 40% off retail pricing. You can preorder and  or buy in person, depending on the site. Visit their website for list of 38 locations in [place].  https://Trax Technology Solutionseforall.theFreebeePresbyterian Santa Fe Medical Centermn.org/    20[employer]. Market  Get organic produce and sustainably sourced groceries delivered at up to 40% off grocery store prices.  https://www.Ivycorp.Ac[employer] Services      [facility]  Fresh Produce Distribution Events and Free Food Markets in [place].   https://[facility].org/programs/food-support/    [facility] Food Shelf:  [address] [place] [address]  Mon-Thurs 10am-4pm  May visit once per month   Items include meat, dairy, bread, and other food, hygiene, cleaning supplies and more. Pet food available upon request.  Additional \"Mini-Market\", parking lot at [address] [place] [address]  o Free fruit, vegetables, salads, and deli items  o Tuesday & Thursday 9:00 " a.m  Nutrition Assistance Program for Seniors (NAPS or  the senior box ).  Eligibility: at least 60 years old & 130% Federal Poverty Guidelines.  To apply, call [employer] [phone].  Free Fresh Food Fridays - Summer Outdoor Distribution:  Fruits & vegetables at [place], 2nd & 4th Fridays 9:30 a.m.  May - September, rain or shine  https://[facility].org/helping-our-neighbors/support-everyday-life/community-food-shelf    [place] Food Shelves ([place]):  https://SPOTBY.COM.NanoICE/food-shelves/  [place] Food Shelf  [address] (near [address])[place], [place] [id number] [phone]    [facility] Food Shelf  [address] (at [facility] [place], [place] [id number] [phone]    Foodmobile - Mobile Food Shelf  Foodmobiles travel throughout [place] and the northern sub[facility] to bring nutritious food to areas of high need. See list of locations here: https://SPOTBY.COM.NanoICE/events/    Healthy Recipe Resources:  Websites:  www.Cyalume Technologies  https://www.Aldagen/  www.iSentium.NanoICE  https://www.diabetesfoodhub.org/all-recipes.html  Https://www.choosemyplate.gov/myplatekitchen  Recipes by Season: https://snaped.fns.usda.gov/recipes-menus   Video Meal Prep: Https://www.youtube.com/c/lolis   Meal Plans: Eatthismuch.com   DASH Diet: https://www.nhlbi.nih.gov/education/dash-eating-plan    Apps:  MealBuySimple pavan (or website, mealime.com)     Sheet Pan Dinner Recipes  https://OnTheGo Platforms/?s=sheet+pan+dinners  https://www.C2C Link/food-cooking/meals-menus/l33903361/sheet-pan-dinner-recipes/     Marinades for chicken, steak and fish  https://Uanbai.Caremerge/10-healthy-chicken-marinades-whole30/  https://www.CHORD.Caremerge/best-steak-marinades/  https://Futuretec.Caremerge/seafood/recipes/marinades     Recipe  Ideas  -https://www.Cole Martin/category/2-ulrhgbxzcz-nediguk/  -https://www.Wiral Internet Group/recipes/pesto-pasta-with-chicken-and-tomatoes/3192687s-91q9-30z7-90e0-2478o48zf51f   -https://www.Brandfolder.Intacct/recipes/photos/things-to-make-with-pesto  -https://Maple Farm Media/collection/sheet-pan-supper-recipes/   -https://www.myplate.gov/recipes/supplemental-nutrition-assistance-program-snap/fruit-kabobs-yogurt-dip   -https://Xtone/rice-recipes/     Vegetable Recipes   https://www.listedplaces/recipes/1059/fruits-and-vegetables/vegetables/  https://www.Character Booster/40-vegetable-side-dishes/  https://www.N4G.com/recipes/side-dishes/    Frozen Meal Ideas:   Lean Cuisine   Green Giant [place] Protein Bowls   [place] Chicken & Chorizo Taco Bowl  Healthy Choice Simply Steamers  Garden Lites Veggies Made Great  Healthy Choice Power Bowls  Lean Cuisine  Smart Ones  Rodri Mcconnell    Follow Up: January 26     Time spent with patient: 24 minutes.  Nevin Birmingham RD, LD

## 2023-11-17 ENCOUNTER — MYC MEDICAL ADVICE (OUTPATIENT)
Dept: ENDOCRINOLOGY | Facility: CLINIC | Age: 33
End: 2023-11-17

## 2023-11-17 ENCOUNTER — TELEPHONE (OUTPATIENT)
Dept: GASTROENTEROLOGY | Facility: CLINIC | Age: 33
End: 2023-11-17

## 2023-11-17 ENCOUNTER — VIRTUAL VISIT (OUTPATIENT)
Dept: ENDOCRINOLOGY | Facility: CLINIC | Age: 33
End: 2023-11-17
Payer: COMMERCIAL

## 2023-11-17 ENCOUNTER — HOSPITAL ENCOUNTER (EMERGENCY)
Facility: CLINIC | Age: 33
Discharge: HOME | End: 2023-11-17
Attending: EMERGENCY MEDICINE | Admitting: EMERGENCY MEDICINE
Payer: COMMERCIAL

## 2023-11-17 ENCOUNTER — APPOINTMENT (OUTPATIENT)
Dept: GENERAL RADIOLOGY | Facility: CLINIC | Age: 33
End: 2023-11-17
Attending: EMERGENCY MEDICINE
Payer: COMMERCIAL

## 2023-11-17 VITALS — BODY MASS INDEX: 50.42 KG/M2 | HEIGHT: 62 IN | WEIGHT: 274 LBS

## 2023-11-17 VITALS
HEART RATE: 77 BPM | RESPIRATION RATE: 16 BRPM | TEMPERATURE: 98.1 F | BODY MASS INDEX: 50.42 KG/M2 | SYSTOLIC BLOOD PRESSURE: 133 MMHG | WEIGHT: 274 LBS | HEIGHT: 62 IN | DIASTOLIC BLOOD PRESSURE: 88 MMHG | OXYGEN SATURATION: 99 %

## 2023-11-17 DIAGNOSIS — E66.813 CLASS 3 SEVERE OBESITY WITH SERIOUS COMORBIDITY AND BODY MASS INDEX (BMI) OF 50.0 TO 59.9 IN ADULT, UNSPECIFIED OBESITY TYPE (H): ICD-10-CM

## 2023-11-17 DIAGNOSIS — E66.01 CLASS 3 SEVERE OBESITY WITH SERIOUS COMORBIDITY AND BODY MASS INDEX (BMI) OF 50.0 TO 59.9 IN ADULT, UNSPECIFIED OBESITY TYPE (H): ICD-10-CM

## 2023-11-17 DIAGNOSIS — U07.1 COVID-19 VIRUS INFECTION: ICD-10-CM

## 2023-11-17 DIAGNOSIS — Z71.3 NUTRITIONAL COUNSELING: Primary | ICD-10-CM

## 2023-11-17 LAB
FLUAV RNA SPEC QL NAA+PROBE: NEGATIVE
FLUBV RNA RESP QL NAA+PROBE: NEGATIVE
RSV RNA SPEC NAA+PROBE: NEGATIVE
SARS-COV-2 RNA RESP QL NAA+PROBE: POSITIVE

## 2023-11-17 PROCEDURE — 99207 PR NO CHARGE LOS: CPT | Mod: VID

## 2023-11-17 PROCEDURE — 250N000013 HC RX MED GY IP 250 OP 250 PS 637: Performed by: EMERGENCY MEDICINE

## 2023-11-17 PROCEDURE — 99284 EMERGENCY DEPT VISIT MOD MDM: CPT | Mod: 25

## 2023-11-17 PROCEDURE — 87637 SARSCOV2&INF A&B&RSV AMP PRB: CPT | Performed by: EMERGENCY MEDICINE

## 2023-11-17 PROCEDURE — 71046 X-RAY EXAM CHEST 2 VIEWS: CPT

## 2023-11-17 PROCEDURE — 97803 MED NUTRITION INDIV SUBSEQ: CPT | Mod: VID

## 2023-11-17 RX ORDER — ACETAMINOPHEN 500 MG
500 TABLET ORAL ONCE
Status: COMPLETED | OUTPATIENT
Start: 2023-11-17 | End: 2023-11-17

## 2023-11-17 RX ADMIN — ACETAMINOPHEN 500 MG: 500 TABLET ORAL at 21:00

## 2023-11-17 ASSESSMENT — ACTIVITIES OF DAILY LIVING (ADL): ADLS_ACUITY_SCORE: 40

## 2023-11-17 ASSESSMENT — PAIN SCALES - GENERAL: PAINLEVEL: NO PAIN (0)

## 2023-11-17 NOTE — TELEPHONE ENCOUNTER
Spoke with pt in regards to follow-up appointment that needs to be rescheduled. I explained I was working with Carli's nurse to find an appointment that works best for the time frame recommended. Will keep pt updated.

## 2023-11-17 NOTE — PATIENT INSTRUCTIONS
Jay Rooney,     Follow-up with RD on January 26.     Thank you,    Nevin Birmingham, SIDNEY, LD  If you would like to schedule or reschedule an appointment with the RD, please call [phone]    Nutrition Goals  Relating To Eating:  - Eat slowly (20-30 minutes per meal), chewing foods well (25 chews per bite/applesauce consistency)  - Focus on eating smaller portion sizes at meals and snacks  -Aim to consume 60 grams of protein each day (ex. 20 grams per meal)    Relating to beverages:  - Reduce caffeine/carbonation/calorie containing beverages  - Separate fluids from meals by 30 minutes before, during, and after eating  - Drink 48-64 ounces of fluid per day. Small, frequent sips between meals.    Relating to activity:  Increase activity as able    The Plate Method  [email or web address]    Protein Sources for Weight Loss  [email or web address]     Carbohydrates  [email or web address]     Mindful Eating  [email or web address]     Summary of Volumetrics Eating Plan  [email or web address]     Diet Guidelines after Weight Loss Surgery  [email or web address]     Seated Exercises for Arms and Legs (can be done before or after surgery)  [email or web address]    Hunger Solutions:   Call the [place] Food Help Line at 1-335.523.1478 to talk with a specialist in community and government food assistance programs. They can help you sign-up for SNAP benefits, find food malone, food shelves and free food in your community and help refer you to any other community assistance programs that you qualify for.   https://www.hungersolutions.org/find-help/    Supplemental Nutrition Assistance Program ([place]NAP):  [place]://[place].gov/dhs/people-we-serve/adults/economic-assistance/food-nutrition/programs-and-services/supplemental-nutrition-assistance-program.jsp    [facility]:  To find a map of food shelves and food distribution pop-ups. Visit  "www.[facility].gov/foodshelves    Market Prowers Program: Spend $10 of EBT, get $10 free at farmers market.  To find map of farmers markets:  https://www.hungersolutions.org/programs/market-bucks/farmersmarkets/    SpendSmart,Eat Smart  Recipe ideas that are suppose to be more affordable  Calculator that allows you to compare product prices   https://spendsmart.extension.[facility]     Fare For All:  Provides discounted groceries. Up to 40% off retail pricing. You can preorder and  or buy in person, depending on the site. Visit their website for list of 38 locations in [place].  https://Howbuyorall.Cubresamn.org/    PSafe  Get organic produce and sustainably sourced groceries delivered at up to 40% off grocery store prices.  https://www.Factual.Mailpile      [facility]  Fresh Produce Distribution Events and Free Food Markets in [place].   https://[facility].org/programs/food-support/    [facility] and [facility] Food Shelf:  [address] [place] [address]  Mon-Thurs 10am-4pm  May visit once per month   Items include meat, dairy, bread, and other food, hygiene, cleaning supplies and more. Pet food available upon request.  Additional \"Mini-Market\", parking lot at [address] [place] [address]  o Free fruit, vegetables, salads, and deli items  o Tuesday & Thursday 9:00 a.m  Nutrition Assistance Program for Seniors (NAPS or  the senior box ).  Eligibility: at least 60 years old & 130% Federal Poverty Guidelines.  To apply, call [place] [phone].  Free Fresh Food Fridays - Summer Outdoor Distribution:  Fruits & vegetables at [place], 2nd & 4th Fridays 9:30 a.m.  May - September, rain or shine  https://[facility].org/helping-our-neighbors/support-everyday-life/community-food-shelf    [place] Food Shelves ([place]):  https://[facility].org/food-shelves/  [place] Food Shelf  [address] (near [facility])[place], [place] " [address]  [phone]    [place] Food Shelf  [address] (at [facility] [place] [address]  [phone]    Foodmobile - Mobile Food Shelf  Foodmobiles travel throughout [place] and the northern [facility] to bring nutritious food to areas of high need. See list of locations here: https://NotaryActrvMavent.org/events/    Healthy Recipe Resources:  Websites:  www.[employer]  https://www.Eleven James/  www.THREAT STREAM  https://www.diabetesfoodhub.org/all-recipes.html  Https://www.Cargomaticmyplate.gov/myplatekitchen  Recipes by Season: https://snaped.fns.usda.gov/recipes-menus   Video Meal Prep: Https://www.Full Circle CRM.com/c/lolis   Meal Plans: Eatthismuch.com   DASH Diet: https://www.nhlbi.nih.gov/education/dash-eating-plan    Apps:  [employer] pavan (or website, mealime.com)     Sheet Pan Dinner Recipes  https://BeamExpress.VasSol/?s=sheet+pan+dinners  https://www.Roposo/food-cooking/meals-menus/u79404466/sheet-pan-dinner-recipes/     Marinades for chicken, steak and fish  https://Dering Hall/10-healthy-chicken-marinades-whole30/  https://www.BagThat.VasSol/best-steak-marinades/  https://fishFlyfit.com/seafood/recipes/marinades     Recipe Ideas  -https://www.Avectra.VasSol/category/1-vrjwqxgayf-tjzqwjp/  -https://www.eyesFinder.VasSol/recipes/pesto-pasta-with-chicken-and-tomatoes/9167551r-28d1-95q2-76k8-2738l77gg29n   -https://www.EQO.com/recipes/photos/things-to-make-with-pesto  -https://www.[facility]/collection/sheet-pan-supper-recipes/   -https://www.myplate.gov/recipes/supplemental-nutrition-assistance-program-snap/fruit-kabobs-yogurt-dip   -https://Animail.VasSol/rice-recipes/     Vegetable Recipes   https://www.Kallfly Pte Ltd.VasSol/recipes/1059/fruits-and-vegetables/vegetables/  https://www.WhoseView.ie.VasSol/40-vegetable-side-dishes/  https://www.Eleven James/recipes/side-dishes/    Frozen Meal Ideas:   Lean Cuisine   Green  [facility] [place] Protein Bowls   [place] Chicken & Chorizo Taco Bowl  Healthy Choice Simply Steamers  Garden Lites Elinaies Made Great  Healthy Choice Power Bowls  Lean Cuisine  Smart Ones  [facility] WEIGHT MANAGEMENT PROGRAM  VIRTUAL SUPPORT GROUPS    For Support Group Information:      We offer support groups for patients who are working on weight loss and considering, preparing for, or have had weight loss surgery.     There is no cost for this opportunity.  You are invited to attend the?Virtual Support Groups?provided by any of the following locations:    [facility] via Microsoft Teams with Roxanna Alonso RN  2.   [place] via VeriCenter with Bird Franz, PhD, LP  3.   [place] via VeriCenter with Margie Stock RN  4.   [facility] via Protonet Teams with Margie Yan Replaced by [facility]    The following Support Group information can also be found on our website:  https://www.[facility].org/treatments/weight-loss-and-weight-loss-surgery-support-groups      [facility] Weight Loss Surgery Support Group    [facility] Weight Loss Surgery Support Group  The support group is a patient-lead forum that meets monthly to share experiences, encouragement and education. It is open to those who have had weight loss surgery, are scheduled for surgery, or are considering surgery.   WHEN: This group meets on the 3rd Wednesday of each month from 5:00PM - 6:00PM virtually using Microsoft Teams.   FACILITATOR: Led by Roxanna Greenberg, RD, LD, RN, the program's Clinical Coordinator.   TO REGISTER: Please contact the clinic via Foruforevert or call the nurse line directly at [phone] to inform our staff that you would like an invite sent to you and to let us know the email you would like the invite sent to. Prior to the meeting, a link with directions on how to join the meeting will be sent to you.    2023 Meetings   September 20  October 18  November 15  December  "20    [facility] Support Groups    Connections Bariatric Care Support Group?  This is open to all pre- and post- operative bariatric surgery patients as well as their support system.   WHEN: Starting June 2023, this group meets the 3rd Tuesday of each month from 6:30 PM - 8:00 PM virtually using Microsoft Teams.   FACILITATOR: Led by Bird Franz, Ph.D who is a Licensed Psychologist with the [facility] Comprehensive Weight Management Program.   TO REGISTER: Please send an email to Bird Franz, Ph.D.,  [email or web address]?if you would like an invitation to the group and to learn about using Microsoft Teams.    2023 Meetings  September 19 Silvestre Ramirez MD, FACS, [facility],   [facility], \"Body Contouring and the Bariatric Surgery Patient\".  October 17: Margie Stock RN, [facility],  Hospital Stay and Compliance   November 21: Alma Clement RD, LD, [facility],  Holiday Eating   December 19    Connections Post-Operative Bariatric Surgery Support Group  This is a support group for [facility] bariatric patients (and those external to [facility]) who have had bariatric surgery and are at least 3 months post-surgery.  WHEN: This support group meets the 4th Wednesday of the month from 11:00 AM - 12:00 PM virtually using Microsoft Teams.   FACILITATOR: Led by Certified Bariatric Nurse, Margie Stock RN.   TO REGISTER: Please send an email to Margie at [email or web address] if you would like an invitation to the group and to learn about using Microsoft Teams.    2023 Meetings September 27 October 25 November 22 December 27    [facility]   Healthy Lifestyle Virtual Support Group      Healthy Lifestyle Group  This is a 60 minute virtual coaching group for those who want to lead a healthier lifestyle. Come " "together to set goals and overcome barriers in a supportive group environment. We will address the four pillars of health--nutrition, exercise, sleep and emotional well-being.  This group is highly recommended for those who are participating in the 24 week Healthy Lifestyle Plan and our Health Coaching sessions.  WHEN: This group meets the first Friday of the month, 12:30 PM - 1:30 PM online, via a zoom meeting.    FACILITATOR: Led by National Board Certified Health and , Margie Yan [facility].  TO REGISTER: Please call the Call Center at [phone] to register. You will get an appointment to attend in [employer]. Fifteen minutes prior to the meeting, complete the e-check in and you will get the link to join the meeting.  There is no charge to attend this group and space is limited.    2023 and 2024 Meeting Topics and Dates:    November 3: Introduction to Mindfulness (Learn simple and effective mindfulness practices and how it can benefit you)  December 8: Let's Talk (guided discussion on our wins and challenges)  January 5: New Years Vision: Manifest your Best 2024! (Guided imagery,  journaling and discussion)  February 2: Let's Talk  March 1: 10 Percent Happier by Jovanni Koenig (Book Bites; a guided discussion on the nuggets of wisdom from favorite wellness books; no need to read the book but highly encouraged)  April 5: Let's Talk  May 3: \"Essentialism; The Disciplined Pursuit of Less by Varinder Acevedo (book bites discussion)  June 7: Let's Talk  July 5: NO MEETING, off for the 4th of July Holiday  August 2: The Blue Zones, Secrets for Living a Longer Life by Jovanni Jim (book bites discussion)          "

## 2023-11-17 NOTE — NURSING NOTE
Is the patient currently in the state of [place]? YES    Visit mode:VIDEO    If the visit is dropped, the patient can be reconnected by: VIDEO VISIT: Text to cell phone:   Telephone Information:   Mobile [phone]       Will anyone else be joining the visit? NO  (If patient encounters technical issues they should call [phone]    How would you like to obtain your AVS? MyChart    Are changes needed to the allergy or medication list? No, Pt stated no changes to allergies, and Pt stated no med changes    Reason for visit: RECHECK ([facility] Nutrition)    Mimi ANTONIO

## 2023-11-17 NOTE — TELEPHONE ENCOUNTER
Spoke with pt in regards to appointment on 02/14/2024 with Carli Potter PA-C. It needs to be rescheduled. Working with Carli's nurse to find an appointment in an appropriate time frame for the pt's follow-up. Will give pt a call back.

## 2023-11-17 NOTE — LETTER
11/17/2023       RE: Nevin Alvarado  [address] [facility] [address]     Dear Colleague,    Thank you for referring your patient, Nevin Alvarado, to the [facility] [place] at [facility]. Please see a copy of my visit note below.    Video-Visit Details    Type of service:  Video Visit    Video Start Time: 1:58 PM  Video End Time: 2:22 PM    Originating Location (pt. Location): Home    Distant Location (provider location): Offsite (providers home) [facility] [place]     Platform used for Video Visit: [employer]    Return Bariatric Nutrition Consultation Note    Reason For Visit: Nutrition Assessment    Nevin Alvarado is a 33 year old presenting today for return bariatric nutrition consult.  Provided the weight loss surgery nutrition class information during this visit.  Pt is interested in laparoscopic sleeve gastrectomy in the future.  Patient is accompanied by self.  This is pt's 3rd nutrition visit prior to surgery.     Pt referred by Sharon Toro NP on September 12, 2023.  CO-MORBIDITIES OF OBESITY INCLUDE:        9/12/2023    12:48 PM   --   I have the following health issues associated with obesity Type II Diabetes    High Blood Pressure    Sleep Apnea    Polycystic Ovarian Syndrome    GERD (Reflux)    Fatty Liver    Asthma    Stress Incontinence     SUPPORT:      9/12/2023    12:48 PM   Support System Reviewed With Patient   Who do you have in your support network that can be available to help you for the first 2 weeks after surgery? I live in a group home with 24 hour staff, mom   Who can you count on for support throughout your weight loss surgery journey? a friend, and my therapist     ANTHROPOMETRICS:  Initial Consult Weight: 309 lb on 9/13/23  Estimated body mass index is 50.12 kg/m  as calculated from the following:    Height as of this encounter: 1.575 m  "(5' 2\").    Weight as of this encounter: 124.3 kg (274 lb).  Current Weight: 274 lb per pt report via [employer], hasn't been to a clinic recently.     Required weight loss goal pre-op: -20 lbs from initial consult weight (goal weight 289 lbs or less before surgery)        9/12/2023    12:48 PM   --   I have tried the following methods to lose weight Watching portions or calories    Exercise    Pre packaged meals ex: Nutrisystem    OTC Medications    Prescription Medications         9/12/2023    12:48 PM   Weight Loss Questions Reviewed With Patient   How long have you been overweight? Since late teens through early 20's     MEDICATION FOR WEIGHT LOSS:  topiramate- took in 2014 for mood- caused anorexia   Naltrexone - suicidal thoughts   Rybelsus - has been on since January 2023.     Hx of self harm- swallowing thing- started after she was treated for anorexia when taking topiramate- in 6 months has only had one day of self harm- this was 2 weeks ago and instead of swallowing she inserted something per rectum. - Followed by MASON Potter PA-C, PE in 2019 after esophageal perforation repair     VITAMIN/MINERAL SUPPLEMENTS:  Iron     NUTRITION HISTORY:  Food allergies:NKFA  Food intolerances: None  Previous experience with diet modification for weight loss: Nutrisystem, prescription medications, exercise, watching calories or portions     Has been meal planning for the past 3 months. Likes chicken, turkey, shrimp.      October 2023: Eating 3 meals per day. Lunch and dinner meals have been chili or mediterranean orzo salad with artichokes more recently. Drinks mostly water, Cup of coffee, B[employer]ages. Doesn't drink soda or juice. Uses a walker for neuropathy. Walking around more often/standing longer which has been an improvement.     November 2023: Reports she has been sick a lot recently. Sometimes feels she has to force herself to eat. Currently has a cold. Working with a GI doctor right now also as she " has been having some GI symptoms after eating certain foods. Reports she had another self harm episode unfortunately, feels this will delay her chances for surgery.         9/12/2023    12:48 PM   Recall Diet Questions Reviewed With Patient   Describe what you typically consume for breakfast (typical or most recent) eggs and hash browns, homemade waffles, laith pudding with fruit   Describe what you typically consume for lunch (typical or most recent) homemade lunchables, some pasta or chicken   Describe what you typically consume for supper (typical or most recent) low calorie meal prep meals   Describe what you typically consume as snacks (typical or most recent) cheese sticks, fruit jerky, fruits/vegetables   How many ounces of water, or other low calorie drinks, do you drink daily (8 oz=1 glass)? 48 oz   How many ounces of caffeine (coffee, tea, pop) do you drink daily (8 oz=1 glass)? 16 oz   How many ounces of carbonated (pop, beer, sparkling water) drinks do you drinky daily (8 oz=1 glass)? 0 oz   How many ounces of juice, pop, sweet tea, sports drinks, protein drinks, other sweetened drinks, do you drink daily (8 oz=1 glass)? 0 oz   How many ounces of milk do you drink daily (8 oz=1 glass) 0 oz   How often do you drink alcohol? Never           9/12/2023    12:48 PM   Eating Habits   What foods do you crave? ice cream, chocolate, sometimes just salty chips           9/12/2023    12:48 PM   Dining Out History Reviewed With Patient   How often do you dine out? Rarely.   Where do you dine out? (select all that apply) take out   What types of food do you order when you dine out? jitendra verdin     EXERCISE:        9/12/2023    12:48 PM   --   How often do you exercise? Less than 1 time per week   What is the duration of your exercise (in minutes)? 10 Minutes   What types of exercise do you do? other   What keeps you from being more active? Pain    My ability to walk or move around is limited    Shortness of breath     Too tired     NUTRITION DIAGNOSIS:  Obesity r/t long history of positive energy balance aeb BMI >30 kg/m2.    INTERVENTION:  Intervention Provided/Education Provided on post-op diet guidelines, vitamins/minerals essential post-operatively, GI anatomy of bariatric surgeries, ways to help prepare for post-op diet guidelines pre-operatively, portion/calorie-control, mindful eating and sources of protein.  Patient demonstrates understanding.     Personal barriers to making and continuing required life changes have been identified, and strategies to overcome those barriers have been recommended AND family and social supports have been assessed and strategies to strengthen those supports have been recommended.    Provided pt with list of goals, RD contact information and resources listed below via [employer].             9/12/2023    12:48 PM   Questions Reviewed With Patient   How ready are you to make changes regarding your weight? Number 1 = Not ready at all to make changes up to 10 = very ready. 10   How confident are you that you can change? 1 = Not confident that you will be successful making changes up to 10 = very confident. 7     Expected Engagement: good    GOALS:  Relating To Eating:  - Eat slowly (20-30 minutes per meal), chewing foods well (25 chews per bite/applesauce consistency)  - Focus on eating smaller portion sizes at meals and snacks  -Aim to consume 60 grams of protein each day (ex. 20 grams per meal)    Relating to beverages:  - Reduce caffeine/carbonation/calorie containing beverages  - Separate fluids from meals by 30 minutes before, during, and after eating  - Drink 48-64 ounces of fluid per day. Small, frequent sips between meals.    Relating to activity:  Increase activity as able    The Plate Method  [email or web address]    Protein Sources for Weight Loss  [email or web address]     Carbohydrates  [email or web address]     Mindful Eating  [email or web address]      Summary of Volumetrics Eating Plan  [email or web address]     Diet Guidelines after Weight Loss Surgery  [email or web address]     Seated Exercises for Arms and Legs (can be done before or after surgery)  [email or web address]    Hunger Solutions:   Call the [place] Food Help Line at [phone] to talk with a specialist in community and government food assistance programs. They can help you sign-up for SNAP benefits, find food malone, food shelves and free food in your community and help refer you to any other community assistance programs that you qualify for.   https://www.SourcebazaarsoSalespush.comions.org/find-help/    Supplemental Nutrition Assistance Program (SNAP):  https://[place].gov/dhs/people-we-serve/adults/economic-assistance/food-nutrition/programs-and-services/supplemental-nutrition-assistance-program.jsp    [facility] Department:  To find a map of food shelves and food distribution pop-ups. Visit www.[facility].gov/foodshelves    Market [place] Program: Spend $10 of EBT, get $10 free at [employer].  To find map of farmers markets:  https://www.Osteogenixions.org/programs/market-bucks/farmersmarkets/    SpendSmart,Eat Smart  Recipe ideas that are suppose to be more affordable  Calculator that allows you to compare product prices   https://spendsmart.extension.Critical access hospital.[facility]     Fare For All:  Provides discounted groceries. Up to 40% off retail pricing. You can preorder and  or buy in person, depending on the site. Visit their website for list of 38 locations in [place].  https://Call Britanniaeforall.thePigeonlyodRed Seraphimmn.org/    Apartment List Market  Get organic produce and sustainably sourced groceries delivered at up to 40% off grocery store prices.  https://www.Dialective.TerraWi      [facility]  Fresh Produce Distribution Events and Free Food Markets in [place].   https://[facility].org/programs/food-support/    [facility] Food Shelf:  [address]  "[facility]  Mon-Thurs 10am-4pm  May visit once per month   Items include meat, dairy, bread, and other food, hygiene, cleaning supplies and more. Pet food available upon request.  Additional \"Mini-Market\", parking lot at [address] [facility] [facility]  o Free fruit, vegetables, salads, and deli items  o Tuesday & Thursday 9:00 a.m  Nutrition Assistance Program for Seniors (NAPS or  the senior box ).  Eligibility: at least 60 years old & 130% Federal Poverty Guidelines.  To apply, call [employer] [phone].  Free Fresh Food Fridays - Summer Outdoor Distribution:  Fruits & vegetables at [place], 2nd & 4th Fridays 9:30 a.m.  May - September, rain or shine  https://[facility] HospitalFanminder.org/helping-our-neighbors/support-everyday-life/community-food-shelf    [place] Food Shelves ([place]):  https://Cascade Technologies.org/food-shelves/  [place] Food Shelf  [address] ([place] [facility] [address]  [phone]    [facility] Food Shelf  [address]at [facility], [place] [address] [phone]    Foodmobile - Mobile Food Shelf  Foodmobiles travel throughout [place] and the northern [facility] [facility] to bring nutritious food to areas of high need. See list of locations here: https://[employer].org/events/    Healthy Recipe Resources:  Websites:  www.Mainstream Data  https://www.SNOBSWAP/  www.DaWanda.org  https://www.diabetesfoodhub.org/all-recipes.html  Https://www.choosemyplate.gov/myplatekitchen  Recipes by Season: https://snaped.fns.usda.gov/recipes-menus   Video Meal Prep: Https://www.RemitDATA.com/c/lolis   Meal Plans: EatQuik.io.OpenWhere   DASH Diet: https://www.nhlbi.nih.gov/education/dash-eating-plan    Apps:  Mealime pavan (or website, mealime.com)     Sheet Pan Dinner Recipes  https://CrossCurrent.OpenWhere/?s=sheet+pan+dinners  https://www.Crowd Factory/food-cooking/meals-menus/y21128179/sheet-pan-dinner-recipes/   "   Marinades for chicken, steak and fish  https://FitnessKeeper/10-healthy-chicken-marinades-whole30/  https://www.[employer]R Consulting/best-steak-marinades/  https://fishInventables.com/seafood/recipes/marinades     Recipe Ideas  -https://Natanael Ulien/category/0-hptyscmgot-zzccnws/  -https://www.Decision Rocket/recipes/pesto-pasta-with-chicken-and-tomatoes/3222846l-98l9-60u8-19r4-5770o20xd66o   -https://www.Hivelocity.FreeBorders/recipes/photos/things-to-make-with-pesto  -https://Raynforest/collection/sheet-pan-supper-recipes/   -https://www.myplate.gov/recipes/supplemental-nutrition-assistance-program-snap/fruit-kabobs-yogurt-dip   -https://BoxCast.FreeBorders/rice-recipes/     Vegetable Recipes   https://www.Movaris.FreeBorders/recipes/1059/fruits-and-vegetables/vegetables/  https://www.[employer].FreeBorders/40-vegetable-side-dishes/  https://www.AgBiome/recipes/side-dishes/    Frozen Meal Ideas:   Lean Cuisine   [employer] Giant [place] Protein Bowls   [place] Chicken & Chorizo Taco Bowl  Healthy Choice Simply Steamers  Garden Lites Veggies Made Great  Healthy Choice Power Bowls  Lean Cuisine  Smart Ones  Rodri Mcconnell    Follow Up: January 26     Time spent with patient: 24 minutes.  Nevin Birmingham, SIDNEY, LD

## 2023-11-18 NOTE — ED TRIAGE NOTES
Pt BIBA for URI symptoms that started on Wednesday.  Was given duo neb by EMS.  VSS, no respiratory distress.       Triage Assessment (Adult)       Row Name 11/17/23 2005          Triage Assessment    Airway WDL WDL        Respiratory WDL    Respiratory WDL cough     Cough Frequency frequent        Skin Circulation/Temperature WDL    Skin Circulation/Temperature WDL WDL        Cardiac WDL    Cardiac WDL WDL        Peripheral/Neurovascular WDL    Peripheral Neurovascular WDL WDL        Cognitive/Neuro/Behavioral WDL    Cognitive/Neuro/Behavioral WDL WDL

## 2023-11-18 NOTE — DISCHARGE INSTRUCTIONS
Discharge Instructions  COVID-19    COVID-19 is the disease caused by a new coronavirus. The virus spreads from person-to-person primarily by droplets when an infected person coughs or sneezes and the droplets are then breathed in by another person.    Symptoms of COVID-19  Many people have no symptoms or mild symptoms.  Symptoms usually appear within a few days, but up to 14-days, after contact with a person with COVID-19.    A mild COVID-19 illness is like a cold and can have fever, cough, sneezing, sore throat, tiredness, headache, and muscle pain.    A moderate COVID-19 illness might include shortness of breath or pneumonia on a chest x-ray.    A severe COVID-19 illness causes significant breathing problems such as low oxygen levels or more serious pneumonia.  Some patients experience loss of taste or smell which is somewhat unique to COVID-19.      Isolation and Quarantine  Testing is recommended for any person with symptoms that could be COVID-19 and often for those exposed to COVID-19. The best way to stop the spread of the virus is to avoid contact with others.    A close contact exposure is being within 6 feet of someone with COVID for 15 minutes.    Isolation refers to sick people staying away from people who are not sick.    A person in quarantine is limiting activity because they were exposed and are waiting to see if they might become sick.    If you test positive for COVID and have no symptoms, you should stay home (isolation) for 5 full days after the day of the test. You should then wear a mask when around others for another 5 days.    If you test positive for COVID and have mild symptoms, you should stay home (isolation) for at least 5 days after your symptoms began. You can return to normal activities at that time, wearing a mask when around others, for another 5 days as long as your symptoms are improving/resolving and you have been without a fever for 24 hours (without using fever-reducing  medicine).    If you test positive for COVID and have more than mild symptoms, you should stay home (isolation) for at least 10 days after your symptoms began. You can return to normal activities at that time as long as your symptoms are improving and you have been without a fever for 24 hours (without using fever-reducing medicine).  For example, if you have a fever and cough for 6 days, you need to stay home 4 more days with no fever for a total of 10 days. Or, if you have a fever and cough for 10 days, you need to stay home one more day with no fever for a total of 11 days.    If you were exposed to COVID and are not vaccinated (or it has been more than six months from your Pfizer or Moderna vaccine or two months from J&J vaccine), you should stay home (quarantine) for 5 days and then wear a mask around others for 5 additional days. A COVID test at day 5 is recommended.    If you were exposed to COVID and are vaccinated (had a booster, had two shots of Pfizer or Moderna vaccine in the last five months, or had J&J vaccine within two months), you do not need to quarantine but should wear a mask around others for 10 days and get a COVID test on day 5.    If you have symptoms but a negative test, you should stay at home until you have mild/improving symptoms and are without fever for 24 hours, using the same judgment you would for when it is safe to return to work/school from strep throat, influenza, or the common cold. If you worsen, you should consider being re-evaluated.    If you are being tested for COVID because of symptoms and your test is pending, you should stay home until you know your test result.  More details on isolation and quarantine can be found on this website from the CDC:  https://www.cdc.gov/coronavirus/2019-ncov/your-health/quarantine-isolation.html    If I have COVID, how should I protect myself and others?    Do not go to work or school. Have a friend or relative do your shopping. Do not use  public transportation (bus, train) or ridesharing (Lyft, Uber).    Separate yourself from other people in your home. As much as possible, you should stay in one room and away from other people in your home. Also, use a separate bathroom, if possible. Avoid handling pets or other animals while sick.     Wear a facemask if you need to be around other people and cover your mouth and nose with a tissue when you cough or sneeze.     Avoid sharing personal household items. You should not share dishes, drinking glasses, forks/knives/spoons, towels, or bedding with other people in your home. After using these items, they should be washed with soap and water. Clean parts of your home that are touched often (doorknobs, faucets, countertops, etc.) daily.     Wash your hands often with soap and water for at least 20 seconds or use an alcohol-based hand  containing at least 60% alcohol.     Avoid touching your face.    Treat your symptoms. You can take Acetaminophen (Tylenol) to treat body aches and fever as needed for comfort. Ibuprofen (Advil or Motrin) can be used as well if you still have symptoms after taking Tylenol. Drink fluids. Rest.    Watch for worsening symptoms such as shortness of breath/difficulty breathing or very severe weakness.    Employers/workplaces are being asked by the Centers for Disease Control (CDC) to not request notes/documentation for you to return to work or prove that you were ill. You may choose to show your employer this paperwork. Also, repeat testing should not be required to return to work.    Exercise/Sports in rare cases, COVID could affect your heart in a way that makes exercise or participation in sports dangerous.  If you have a mild COVID illness (fever, cough, sore throat, and similar symptoms but no difficulty breathing or abnormalities of the lung): After your COVID symptoms have resolved, wait 14-days before returning to activity.  If you have more than a mild illness  (meaning that you have problems with your breathing or lungs) or if you participate in competitive or strenuous activity or have a history of heart disease: Please see your primary doctor/provider prior to return to activity/competition.    COVID treatments such as antiviral and antibody medications are available. They are recommended for those patients who have a risk for developing more severe COVID illness. Importantly, the treatments must be started early in the illness (within 5-7 days, depending on which treatment). These treatments may have been considered today during your visit. If you have other questions, contact your primary doctor/clinic.     You can learn more about COVID treatments from the [facility]:  https://www.health.[facility]./diseases/coronavirus/meds.html    Return to the Emergency Department if:    If you are developing worsening breathing, shortness of breath, or feel worse you should seek medical attention.  If you are uncertain, contact your health care provider/clinic. If you need emergency medical attention, call 911 and tell them you have been ill.

## 2023-11-18 NOTE — ED PROVIDER NOTES
"  History     Chief Complaint:  URI and Cough       The history is provided by the patient.      Nevin Alvarado is a 33 year old female with a history of borderline personality disorder who presents due to a cough, sore throat, and myalgias that began two days ago. The patient also reports intermittent chest pain, shortness of breath, and diarrhea causing her to present to the ED. She has taken Tylenol, Robitussin, and nasal sprays for her symptoms. The patient denies vomiting or fever or any other symptoms such as dysuria.      Independent Historian:   None - Patient Only    Review of External Notes:   I reviewed the [facility].        Medications:    Albuterol   Rexulti   Klonopin   Lasix   Plaquenil   Metformin   Relafen   Aygestin   Singulair   Lyrica   Rydelsus   Wegovy   Imitrex   valtrex    Past Medical History:    ADD   Anorexia nervosa with bulimia   Anxiety   Asthma   Borderline personality disorder  PE   Neuropathy   PTSD   Rectal foreign body   Sleep apnea   ADD   Migraine   Carpal tunnel   Scoliosis   GERD  Depression   Endometriosis     Past Surgical History:    Abdominal surgery x2  EGD x64  Exam under anesthesia anus x3  Fecal impaction removal   Knee surgery x2   Endometriosis ablation   Laparotomy x2  Mammoplasty reduction   Release carpal tunnel x2   Removal foreign body rectum x2   Sigmoidoscopy x3    Physical Exam   Patient Vitals for the past 24 hrs:   BP Temp Temp src Pulse SpO2 Height Weight   11/17/23 2009 133/88 98.1  F (36.7  C) Oral 91 99 % -- --   11/17/23 2007 -- -- -- -- -- 1.575 m (5' 2\") 124.3 kg (274 lb)        Physical Exam  Nursing note and vitals reviewed.  Constitutional:  Oriented to person, place, and time. Cooperative.   HENT:   Nose:    Nose normal.   Mouth/Throat:   Mucous membranes are normal.   Eyes:    Conjunctivae normal and EOM are normal.      Pupils are equal, round, and reactive to light.   Neck:    Trachea normal.   Cardiovascular:  Normal rate, regular rhythm, " normal heart sounds and normal pulses. No murmur heard.  Pulmonary/Chest:  Effort normal and breath sounds normal.   Abdominal:   Soft. Normal appearance and bowel sounds are normal.      There is no tenderness.      There is no rebound and no CVA tenderness.   Musculoskeletal:  Extremities atraumatic x 4.   Lymphadenopathy:  No cervical adenopathy.   Neurological:   Alert and oriented to person, place, and time. Normal strength.      No cranial nerve deficit or sensory deficit. GCS eye subscore is 4. GCS verbal subscore is 5. GCS motor subscore is 6.   Skin:    Skin is intact. No rash noted.   Psychiatric:   Normal mood and affect.      Emergency Department Course     Imaging:  Chest XR,  PA & LAT   Final Result   IMPRESSION:       No focal airspace disease. No pleural effusion or pneumothorax.      The cardiomediastinal silhouette is unremarkable.      Scoliotic curvature of the spine.      Upper abdominal surgical clips.      Report per radiology.        Laboratory:  Labs Ordered and Resulted from Time of ED Arrival to Time of ED Departure   INFLUENZA A/B, RSV, & SARS-COV2 PCR - Abnormal       Result Value    Influenza A PCR Negative      Influenza B PCR Negative      RSV PCR Negative      SARS CoV2 PCR Positive (*)         Emergency Department Course & Assessments:         Interventions:  Medications   acetaminophen (TYLENOL) tablet 500 mg (500 mg Oral $Given 11/17/23 2100)        Assessments:  2025 I obtained history and examined the patient as noted above.   2109 I rechecked the patient and explained findings. We discussed plan for discharge and patient is in agreement with plan.      Independent Interpretation (X-rays, CTs, rhythm strip):  I independently interpreted the patient's chest XR, and I agree with the negative reading for infiltrate.     Consultations/Discussion of Management or Tests:  None        Social Determinants of Health affecting care:   Stress/Adjustment Disorders    Disposition:  The  patient was discharged to home.     Impression & Plan    CMS Diagnoses: None    Medical Decision Making:  Nevin Alvarado is a 33 year old female who presents with symptoms that are concerning for possible COVID-19, see HPI for details. Test was positive here today. Vital signs are reassuring, and the patient is not hypoxic. Chest x-ray was obtained that was negative. No extra work of breathing, normal mentation, and I do not believe that the patient requires hospitalization at this time. The patient does not currently meet criteria for hospitalization. Patient given [facility] guidelines for home isolation and will follow up with PCP. Patient will drink plenty of fluids, take anti-pyretics and OTC decongestants. Patient asked to return for severe difficulty breathing or new onset chest pain or other worrisome concerns. The patient understood the plan and was discharged home in stable condition. All questions answered.     Diagnosis:    ICD-10-CM    1. COVID-19 virus infection  U07.1          Scribe Disclosure:  I, Matilde Cassidy, am serving as a scribe at 8:31 PM on 11/17/2023 to document services personally performed by Sina Patel MD based on my observations and the provider's statements to me.     11/17/2023   Sina Patel MD Lashkowitz, Seth H, MD  11/17/23 7416

## 2023-11-18 NOTE — ED NOTES
Spoke with staff at  gave report and told them pt is coming back and they informed pt is able to take lyft home

## 2023-11-21 ENCOUNTER — HOSPITAL ENCOUNTER (EMERGENCY)
Facility: CLINIC | Age: 33
Discharge: HOME | End: 2023-11-22
Attending: STUDENT IN AN ORGANIZED HEALTH CARE EDUCATION/TRAINING PROGRAM | Admitting: STUDENT IN AN ORGANIZED HEALTH CARE EDUCATION/TRAINING PROGRAM
Payer: COMMERCIAL

## 2023-11-21 ENCOUNTER — APPOINTMENT (OUTPATIENT)
Dept: RADIOLOGY | Facility: CLINIC | Age: 33
End: 2023-11-21
Attending: STUDENT IN AN ORGANIZED HEALTH CARE EDUCATION/TRAINING PROGRAM
Payer: COMMERCIAL

## 2023-11-21 ENCOUNTER — APPOINTMENT (OUTPATIENT)
Dept: CT IMAGING | Facility: CLINIC | Age: 33
End: 2023-11-21
Attending: STUDENT IN AN ORGANIZED HEALTH CARE EDUCATION/TRAINING PROGRAM
Payer: COMMERCIAL

## 2023-11-21 DIAGNOSIS — R09.1 PLEURITIS: ICD-10-CM

## 2023-11-21 LAB
ATRIAL RATE - MUSE: 78 BPM
BASOPHILS # BLD AUTO: 0 10E3/UL (ref 0–0.2)
BASOPHILS # BLD AUTO: NORMAL 10*3/UL
BASOPHILS NFR BLD AUTO: 1 %
BASOPHILS NFR BLD AUTO: NORMAL %
D DIMER PPP FEU-MCNC: 1.13 UG/ML FEU (ref 0–0.5)
DIASTOLIC BLOOD PRESSURE - MUSE: 97 MMHG
EOSINOPHIL # BLD AUTO: 0.2 10E3/UL (ref 0–0.7)
EOSINOPHIL # BLD AUTO: NORMAL 10*3/UL
EOSINOPHIL NFR BLD AUTO: 3 %
EOSINOPHIL NFR BLD AUTO: NORMAL %
ERYTHROCYTE [DISTWIDTH] IN BLOOD BY AUTOMATED COUNT: 13.6 % (ref 10–15)
ERYTHROCYTE [DISTWIDTH] IN BLOOD BY AUTOMATED COUNT: NORMAL %
FLUAV RNA SPEC QL NAA+PROBE: NEGATIVE
FLUBV RNA RESP QL NAA+PROBE: NEGATIVE
HCT VFR BLD AUTO: 40.6 % (ref 35–47)
HCT VFR BLD AUTO: NORMAL %
HGB BLD-MCNC: 13.6 G/DL (ref 11.7–15.7)
HGB BLD-MCNC: NORMAL G/DL
IMM GRANULOCYTES # BLD: 0 10E3/UL
IMM GRANULOCYTES # BLD: NORMAL 10*3/UL
IMM GRANULOCYTES NFR BLD: 0 %
IMM GRANULOCYTES NFR BLD: NORMAL %
INTERPRETATION ECG - MUSE: NORMAL
LYMPHOCYTES # BLD AUTO: 2.1 10E3/UL (ref 0.8–5.3)
LYMPHOCYTES # BLD AUTO: NORMAL 10*3/UL
LYMPHOCYTES NFR BLD AUTO: 32 %
LYMPHOCYTES NFR BLD AUTO: NORMAL %
MCH RBC QN AUTO: 30.2 PG (ref 26.5–33)
MCH RBC QN AUTO: NORMAL PG
MCHC RBC AUTO-ENTMCNC: 33.5 G/DL (ref 31.5–36.5)
MCHC RBC AUTO-ENTMCNC: NORMAL G/DL
MCV RBC AUTO: 90 FL (ref 78–100)
MCV RBC AUTO: NORMAL FL
MONOCYTES # BLD AUTO: 0.4 10E3/UL (ref 0–1.3)
MONOCYTES # BLD AUTO: NORMAL 10*3/UL
MONOCYTES NFR BLD AUTO: 6 %
MONOCYTES NFR BLD AUTO: NORMAL %
NEUTROPHILS # BLD AUTO: 3.8 10E3/UL (ref 1.6–8.3)
NEUTROPHILS # BLD AUTO: NORMAL 10*3/UL
NEUTROPHILS NFR BLD AUTO: 58 %
NEUTROPHILS NFR BLD AUTO: NORMAL %
NRBC # BLD AUTO: 0 10E3/UL
NRBC BLD AUTO-RTO: 0 /100
P AXIS - MUSE: 23 DEGREES
PLATELET # BLD AUTO: 205 10E3/UL (ref 150–450)
PLATELET # BLD AUTO: NORMAL 10*3/UL
PR INTERVAL - MUSE: 160 MS
QRS DURATION - MUSE: 78 MS
QT - MUSE: 378 MS
QTC - MUSE: 430 MS
R AXIS - MUSE: 77 DEGREES
RBC # BLD AUTO: 4.51 10E6/UL (ref 3.8–5.2)
RBC # BLD AUTO: NORMAL 10*6/UL
RSV RNA SPEC NAA+PROBE: NEGATIVE
SARS-COV-2 RNA RESP QL NAA+PROBE: POSITIVE
SYSTOLIC BLOOD PRESSURE - MUSE: 146 MMHG
T AXIS - MUSE: 21 DEGREES
VENTRICULAR RATE- MUSE: 78 BPM
WBC # BLD AUTO: 6.6 10E3/UL (ref 4–11)
WBC # BLD AUTO: NORMAL 10*3/UL

## 2023-11-21 PROCEDURE — 87637 SARSCOV2&INF A&B&RSV AMP PRB: CPT | Performed by: STUDENT IN AN ORGANIZED HEALTH CARE EDUCATION/TRAINING PROGRAM

## 2023-11-21 PROCEDURE — 85025 COMPLETE CBC W/AUTO DIFF WBC: CPT | Performed by: STUDENT IN AN ORGANIZED HEALTH CARE EDUCATION/TRAINING PROGRAM

## 2023-11-21 PROCEDURE — 99285 EMERGENCY DEPT VISIT HI MDM: CPT | Mod: 25

## 2023-11-21 PROCEDURE — 93005 ELECTROCARDIOGRAM TRACING: CPT | Performed by: EMERGENCY MEDICINE

## 2023-11-21 PROCEDURE — 250N000011 HC RX IP 250 OP 636: Performed by: STUDENT IN AN ORGANIZED HEALTH CARE EDUCATION/TRAINING PROGRAM

## 2023-11-21 PROCEDURE — 96374 THER/PROPH/DIAG INJ IV PUSH: CPT | Mod: 59

## 2023-11-21 PROCEDURE — 36415 COLL VENOUS BLD VENIPUNCTURE: CPT | Performed by: STUDENT IN AN ORGANIZED HEALTH CARE EDUCATION/TRAINING PROGRAM

## 2023-11-21 PROCEDURE — 93005 ELECTROCARDIOGRAM TRACING: CPT | Performed by: STUDENT IN AN ORGANIZED HEALTH CARE EDUCATION/TRAINING PROGRAM

## 2023-11-21 PROCEDURE — 84484 ASSAY OF TROPONIN QUANT: CPT | Performed by: STUDENT IN AN ORGANIZED HEALTH CARE EDUCATION/TRAINING PROGRAM

## 2023-11-21 PROCEDURE — 85379 FIBRIN DEGRADATION QUANT: CPT | Performed by: STUDENT IN AN ORGANIZED HEALTH CARE EDUCATION/TRAINING PROGRAM

## 2023-11-21 PROCEDURE — 71275 CT ANGIOGRAPHY CHEST: CPT

## 2023-11-21 PROCEDURE — 71046 X-RAY EXAM CHEST 2 VIEWS: CPT

## 2023-11-21 PROCEDURE — 250N000011 HC RX IP 250 OP 636: Mod: JZ | Performed by: STUDENT IN AN ORGANIZED HEALTH CARE EDUCATION/TRAINING PROGRAM

## 2023-11-21 PROCEDURE — 80053 COMPREHEN METABOLIC PANEL: CPT | Performed by: STUDENT IN AN ORGANIZED HEALTH CARE EDUCATION/TRAINING PROGRAM

## 2023-11-21 RX ORDER — IOPAMIDOL 755 MG/ML
75 INJECTION, SOLUTION INTRAVASCULAR ONCE
Status: COMPLETED | OUTPATIENT
Start: 2023-11-21 | End: 2023-11-21

## 2023-11-21 RX ORDER — KETOROLAC TROMETHAMINE 15 MG/ML
15 INJECTION, SOLUTION INTRAMUSCULAR; INTRAVENOUS ONCE
Status: COMPLETED | OUTPATIENT
Start: 2023-11-21 | End: 2023-11-21

## 2023-11-21 RX ADMIN — KETOROLAC TROMETHAMINE 15 MG: 15 INJECTION INTRAMUSCULAR; INTRAVENOUS at 21:44

## 2023-11-21 RX ADMIN — IOPAMIDOL 75 ML: 755 INJECTION, SOLUTION INTRAVENOUS at 22:38

## 2023-11-21 ASSESSMENT — ACTIVITIES OF DAILY LIVING (ADL)
ADLS_ACUITY_SCORE: 40
ADLS_ACUITY_SCORE: 40
ADLS_ACUITY_SCORE: 38

## 2023-11-22 ENCOUNTER — TELEPHONE (OUTPATIENT)
Dept: GASTROENTEROLOGY | Facility: CLINIC | Age: 33
End: 2023-11-22
Payer: COMMERCIAL

## 2023-11-22 VITALS
SYSTOLIC BLOOD PRESSURE: 131 MMHG | WEIGHT: 274 LBS | HEIGHT: 62 IN | TEMPERATURE: 97.7 F | BODY MASS INDEX: 50.42 KG/M2 | RESPIRATION RATE: 20 BRPM | OXYGEN SATURATION: 98 % | HEART RATE: 84 BPM | DIASTOLIC BLOOD PRESSURE: 77 MMHG

## 2023-11-22 LAB
ALBUMIN SERPL BCG-MCNC: 4.1 G/DL (ref 3.5–5.2)
ALP SERPL-CCNC: 78 U/L (ref 40–150)
ALT SERPL W P-5'-P-CCNC: 13 U/L (ref 0–50)
ANION GAP SERPL CALCULATED.3IONS-SCNC: 13 MMOL/L (ref 7–15)
AST SERPL W P-5'-P-CCNC: 17 U/L (ref 0–45)
BILIRUB SERPL-MCNC: 0.4 MG/DL
BUN SERPL-MCNC: 7.9 MG/DL (ref 6–20)
CALCIUM SERPL-MCNC: 8.7 MG/DL (ref 8.6–10)
CHLORIDE SERPL-SCNC: 107 MMOL/L (ref 98–107)
CREAT SERPL-MCNC: 0.59 MG/DL (ref 0.51–0.95)
DEPRECATED HCO3 PLAS-SCNC: 21 MMOL/L (ref 22–29)
EGFRCR SERPLBLD CKD-EPI 2021: >90 ML/MIN/1.73M2
GLUCOSE SERPL-MCNC: 105 MG/DL (ref 70–99)
HOLD SPECIMEN: NORMAL
POTASSIUM SERPL-SCNC: 3.5 MMOL/L (ref 3.4–5.3)
PROT SERPL-MCNC: 6.8 G/DL (ref 6.4–8.3)
SODIUM SERPL-SCNC: 141 MMOL/L (ref 135–145)
TROPONIN T SERPL HS-MCNC: 10 NG/L

## 2023-11-22 PROCEDURE — 250N000013 HC RX MED GY IP 250 OP 250 PS 637: Performed by: STUDENT IN AN ORGANIZED HEALTH CARE EDUCATION/TRAINING PROGRAM

## 2023-11-22 PROCEDURE — 250N000011 HC RX IP 250 OP 636: Performed by: STUDENT IN AN ORGANIZED HEALTH CARE EDUCATION/TRAINING PROGRAM

## 2023-11-22 RX ORDER — ALBUTEROL SULFATE 0.83 MG/ML
2.5 SOLUTION RESPIRATORY (INHALATION) EVERY 6 HOURS PRN
Qty: 90 ML | Refills: 0 | Status: SHIPPED | OUTPATIENT
Start: 2023-11-22

## 2023-11-22 RX ORDER — ONDANSETRON 4 MG/1
4 TABLET, ORALLY DISINTEGRATING ORAL ONCE
Status: COMPLETED | OUTPATIENT
Start: 2023-11-22 | End: 2023-11-22

## 2023-11-22 RX ORDER — ACETAMINOPHEN 325 MG/1
650 TABLET ORAL ONCE
Status: COMPLETED | OUTPATIENT
Start: 2023-11-22 | End: 2023-11-22

## 2023-11-22 RX ADMIN — ONDANSETRON 4 MG: 4 TABLET, ORALLY DISINTEGRATING ORAL at 04:12

## 2023-11-22 RX ADMIN — ACETAMINOPHEN 650 MG: 325 TABLET ORAL at 04:12

## 2023-11-22 RX ADMIN — ACETAMINOPHEN 650 MG: 325 TABLET ORAL at 00:16

## 2023-11-22 ASSESSMENT — ACTIVITIES OF DAILY LIVING (ADL)
ADLS_ACUITY_SCORE: 40

## 2023-11-22 NOTE — ED TRIAGE NOTES
Arrives to ED via [facility] with c/o L sided CP that radiates around to L side of back. Began at 1400 today. Woke pt from sleep. Worse with deep breathing. COVID+ on 11/17. Taking tylenol without relief.      Triage Assessment (Adult)       Row Name 11/21/23 0788          Triage Assessment    Airway WDL WDL        Respiratory WDL    Respiratory WDL WDL        Skin Circulation/Temperature WDL    Skin Circulation/Temperature WDL WDL        Cardiac WDL    Cardiac WDL X;chest pain        Peripheral/Neurovascular WDL    Peripheral Neurovascular WDL WDL        Cognitive/Neuro/Behavioral WDL    Cognitive/Neuro/Behavioral WDL WDL

## 2023-11-22 NOTE — DISCHARGE INSTRUCTIONS
Your blood work today was reassuring.  The CT scan of your chest showed no blood clot or pneumonia.    Your blood work was normal showing no signs of heart attack.    Please continue to take your medications at home.  Return for worsening symptoms.

## 2023-11-22 NOTE — TELEPHONE ENCOUNTER
[facility] Call Center    Phone Message    May a detailed message be left on voicemail: yes     Reason for Call: Other: Pt called to inform that she was told that her appt would not be cancelled per Carli Be;'s nurse. Pt states she is already working with her psychologist, her insurance will not cover Carli Perea's visits. Pt is upset as she was not contacted prior to appt being cancelled. Pt requesting a call back at . Pt requesting a call ASAP, she was told Friday that she would get a call on Monday and that did not happen pt states.      Action Taken: [facility]    Travel Screening: Not Applicable

## 2023-11-22 NOTE — TELEPHONE ENCOUNTER
LVM and sent MyC in regards to:       Next available, [facility], with Carli Perea,PhD as recommended by Carli Potter PA-C. Patient needs to have a couple visits with Carli Perea, PhD  before follow-up with Carli Potter PA-C.    Left K POD number

## 2023-11-22 NOTE — ED PROVIDER NOTES
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 signs of respiratory distress, lungs are clear to auscultation bilaterally with no wheezes rhonchi or rales.  Abdominal: Soft, nontender, nondistended, no palpable masses, no guarding, no rebound  Musculoskeletal: Full range of motion of joints, no deformities " appreciated.  Neurological: Alert and oriented, grossly neurologically intact.  Psychological: Normal affect and mood.  Integument: No rashes appreciated          RESULTS       Labs Ordered and Resulted from Time of ED Arrival to Time of ED Departure - No data to display    Chest XR,  PA & LAT    (Results Pending)         PROCEDURES:  Procedures:  Procedures       Marcos Marsh DO  Emergency Medicine  [facility] EMERGENCY ROOM       Marcos Marsh DO  11/22/23 0016

## 2023-11-29 NOTE — TELEPHONE ENCOUNTER
General Call    Contacts         Type Contact Phone/Fax    11/29/2023 04:41 PM CST Phone (Incoming) Nevin Alvarado (Self) [phone] (M)     Would like a call back from care team to follow-up on initial message. Does not want to go down on medication and would like to go over other options or possibilities          Reason for Call:  Follow-up     What are your questions or concerns:  Would like a call back from care team to follow-up on initial message. Does not want to go down on medication and would like to go over other options or possibilities     Date of last appointment with provider: 9/13/23    Could we send this information to you in "[employer] or would you prefer to receive a phone call?:   Patient would prefer a phone call   Okay to leave a detailed message?: Yes at Cell number on file:    Telephone Information:   Mobile [phone]

## 2023-11-30 NOTE — TELEPHONE ENCOUNTER
Patient is on Rybelsus 14 mg and has been on this dose since January of 2023. Has been having nausea and vomiting but does not feel this is related to Rybelsus due to her having been on this dose for so long. Her GI doctor has put her on omeprazole and her symptoms are better. Has not vomited in 2 weeks and has been taking the Rybelsus 14 mg daily. Does not want to stop Rybelsus or lower her dose at this time.     Will update us if her GI symptoms return. Will inform Sharon Toro of above.

## 2023-11-30 NOTE — TELEPHONE ENCOUNTER
Spoke with pt and held spot for pt on provider's schedule for 2/6/24 to discuss results of EGD that is scheduled for 1/23/24. Pt's insurance will not cover an appointment with Carli Perea and pt is unable to schedule. Pt is comfortable with this plan and did discuss what to expect in terms of endoscopy planning and a pre-procedure nurse will call her at least one week prior to the procedure to go over prep.

## 2023-12-01 ENCOUNTER — VIRTUAL VISIT (OUTPATIENT)
Dept: ENDOCRINOLOGY | Facility: CLINIC | Age: 33
End: 2023-12-01
Payer: COMMERCIAL

## 2023-12-01 VITALS — HEIGHT: 62 IN | BODY MASS INDEX: 50.42 KG/M2 | WEIGHT: 274 LBS

## 2023-12-01 DIAGNOSIS — E66.01 CLASS 3 SEVERE OBESITY WITH SERIOUS COMORBIDITY AND BODY MASS INDEX (BMI) OF 50.0 TO 59.9 IN ADULT, UNSPECIFIED OBESITY TYPE (H): Primary | ICD-10-CM

## 2023-12-01 DIAGNOSIS — E66.813 CLASS 3 SEVERE OBESITY WITH SERIOUS COMORBIDITY AND BODY MASS INDEX (BMI) OF 50.0 TO 59.9 IN ADULT, UNSPECIFIED OBESITY TYPE (H): Primary | ICD-10-CM

## 2023-12-01 PROCEDURE — 99215 OFFICE O/P EST HI 40 MIN: CPT | Mod: VID | Performed by: NURSE PRACTITIONER

## 2023-12-01 ASSESSMENT — PAIN SCALES - GENERAL: PAINLEVEL: NO PAIN (0)

## 2023-12-01 NOTE — LETTER
2023       RE: Nevin Alvarado  [address] [facility] [address]     Dear Colleague,    Thank you for referring your patient, Nevin Alvarado, to the [facility] WE[facility]NAG[facility] [place] [facility]. Please see a copy of my visit note below.    Virtual Visit Details    Type of service:  Video Visit   Video Start Time: 1200  Video End Time:1230    Originating Location (pt. Location): Home    Distant Location (provider location):  Off-site  Platform used for Video Visit: [facility] Medical Weight Management Note     Nevin Alvarado  MRN:  [id number]  :  1990  MOR:  2023    Dear Latonya Knight MD,    I had the pleasure of seeing your patient Nevin Alvarado. She is a 33 year old female who I am continuing to see for treatment of obesity related to:        2023    12:48 PM   --   I have the following health issues associated with obesity Type II Diabetes    High Blood Pressure    Sleep Apnea    Polycystic Ovarian Syndrome    GERD (Reflux)    Fatty Liver    Asthma    Stress Incontinence       Assessment & Plan   Problem List Items Addressed This Visit       Class 3 severe obesity with serious comorbidity and body mass index (BMI) of 50.0 to 59.9 in adult, unspecified obesity type (H) - Primary      Plan  Start taking 1mg wegovy each week (because already taking rybelsus 14mg, will likely tolerate starting at 1mg wegovy. She understands there is a risk of worsened nausea or GERD from changing to this higher dose.)   Stop taking rybelsus  Discontinue 0.5mg wegovy   Follow up with Sharon in 2-3 months   Dietician scheduled for 2024  Keep up the excellent work!      INTERVAL HISTORY:  Taking rybelsis since 2023, except for month when taking wegovy (switched back to rybeslsus due to supply issues of wegovy)   Also taking metformin     Since last appointment: Has been to ED  numerous times since last appointment, has ED treatment plan in place.   Took wegovy for 1 month, unsure if it helped.   Got covid 2 weeks ago, doing better now.   Feels that she has plateaued with weight   Has improved with activity level, is currently able to do more cleaning and activities around the house.     Wt Readings from Last 5 Encounters:   12/01/23 124.3 kg (274 lb)   11/21/23 124.3 kg (274 lb)   11/17/23 124.3 kg (274 lb)   11/17/23 124.3 kg (274 lb)   11/05/23 127 kg (280 lb)       Anti-obesity medication history    Current:   Rybelsus 14mg    Past/Failed/contraindicated:   Past: Wegovy 0.25mg- well tolerated, no side effects. Discontinued due to supply issues for 0.5mg dose    Failed:   topiramate- took in 2014 for mood- caused anorexia   Naltrexone - suicidal thoughts       GERD symptoms: no change     Constipation/Diarrhea: none     Recent diet changes: has weekly budget for groceries, does meal prepping, snacking less.     Recent exercise/activity changes: Increased activity, able to do cooking and cleaning, hasn't needed to use walker     Recent stressors: well managed     Recent sleep changes: okay, no changes. Wears CPAP    Vitamins/Labs: none needed today       CURRENT WEIGHT:   274 lbs 0 oz    Initial Weight (lbs): 309 lbs  Last Visits Weight: 124.3 kg (274 lb)  Cumulative weight loss (lbs): 35  Weight Loss Percentage: 11.33%         No data to display                Wt Readings from Last 5 Encounters:   12/01/23 124.3 kg (274 lb)   11/21/23 124.3 kg (274 lb)   11/17/23 124.3 kg (274 lb)   11/17/23 124.3 kg (274 lb)   11/05/23 127 kg (280 lb)           MEDICATIONS:   Current Outpatient Medications   Medication Sig Dispense Refill    albuterol (PROAIR HFA/PROVENTIL HFA/VENTOLIN HFA) 108 (90 Base) MCG/ACT inhaler Inhale 2 puffs into the lungs every 6 hours as needed for shortness of breath / dyspnea or wheezing 18 g 0    albuterol (PROVENTIL) (2.5 MG/3ML) 0.083% neb solution Take 1 vial (2.5 mg)  by nebulization every 6 hours as needed for shortness of breath or wheezing 90 mL 0    [place] Saline Nasal No-Drip GEL Spray 1 Application in nostril daily Apply into each nare 2 times daily Place in front of each side of your nose and breathe in to distribute gel daily. - Each Nare 22 mL 11    brexpiprazole (REXULTI) 1 MG tablet Take 1 mg by mouth at bedtime      cetirizine (ZYRTEC) 10 MG tablet Take 1 tablet (10 mg) by mouth daily 30 tablet 0    Cholecalciferol (D3 HIGH POTENCY) 25 MCG (1000 UT) CAPS Take 50 mcg by mouth daily      clonazePAM (KLONOPIN) 0.5 MG tablet Take 0.5mg daily PRN anxiety- 20 tablets per month, try to keep it to 3-4x per week      cyclobenzaprine (FLEXERIL) 10 MG tablet Take 1 tablet (10 mg) by mouth 3 times daily as needed for muscle spasms 20 tablet 0    ferrous sulfate (FEROSUL) 325 (65 Fe) MG tablet Take 1 tablet (325 mg) by mouth daily (with breakfast) 30 tablet 0    furosemide (LASIX) 20 MG tablet Take 20 mg by mouth daily      hydroxychloroquine (PLAQUENIL) 200 MG tablet Take 200 mg by mouth daily      metFORMIN (GLUCOPHAGE XR) 500 MG 24 hr tablet Take 1,000 mg by mouth daily (with breakfast)      montelukast (SINGULAIR) 10 MG tablet Take 10 mg by mouth every evening      nabumetone (RELAFEN) 750 MG tablet Take 750 mg by mouth 2 times daily      norethindrone (AYGESTIN) 5 MG tablet Take 5 mg by mouth daily      omeprazole (PRILOSEC) 40 MG DR capsule Take 1 capsule (40 mg) by mouth 2 times daily (before meals) 180 capsule 3    ondansetron (ZOFRAN-ODT) 4 MG ODT tab Take 1 tablet (4 mg) by mouth every 8 hours as needed for nausea 15 tablet 0    pregabalin (LYRICA) 100 MG capsule Take 100 mg by mouth every morning      pregabalin (LYRICA) 100 MG capsule Take 200 mg by mouth at bedtime      Respiratory Therapy Supplies (NEBULIZER) BRENDAN Nebulizer device.  Albuterol nebulization every 2 hours as needed for shortness of breath or wheezing. 1 each 0    Semaglutide (RYBELSUS) 14 MG tablet Take 14  "mg by mouth daily 90 tablet 3    sodium chloride (OCEAN) 0.65 % nasal spray Spray 2 sprays in each side of the nose every 3 hours while awake. 44 mL 11    SUMAtriptan (IMITREX) 25 MG tablet Take 25 mg by mouth at onset of headache for migraine May repeat in 2 hours.      BANOPHEN 2-0.1 % external cream Apply 1 applicator topically 2 times daily as needed for itching (Patient not taking: Reported on 12/1/2023)      fluocinonide (LIDEX) 0.05 % external cream Apply 1 Application topically 2 times daily as needed Apply thinly to knee 2 times daily, Monday through Fridays, off on weekends as needed. Avoid face and skin folds. (Patient not taking: Reported on 12/1/2023)      Lidocaine (LIDOCARE) 4 % Patch Place 1 patch onto the skin every 24 hours To prevent lidocaine toxicity, patient should be patch free for 12 hrs daily. 10 patch 0    Nutritional Supplements (ENSURE MAX PROTEIN) LIQD Take 240 mLs by mouth daily 7200 mL 11    polyethylene glycol (MIRALAX) 17 g packet Take 17 g by mouth daily for 60 days 30 packet 1    Semaglutide-Weight Management (WEGOVY) 0.5 MG/0.5ML pen Inject 0.5 mg Subcutaneous every 7 days (Patient not taking: Reported on 12/1/2023) 2 mL 1    sucralfate (CARAFATE) 1 GM/10ML suspension Take 10 mLs (1 g) by mouth 4 times daily 400 mL 0    valACYclovir (VALTREX) 1000 mg tablet Take 2,000 mg by mouth 2 times daily as needed Take 2 tablets by mouth two times daily for one day. Use as needed at onset of cold sore.              No data to display                        9/13/2023    10:26 AM   BRIAN Score (Last Two)   BRIAN Raw Score 37   Activation Score 79.2   BRIAN Level 4         PHYSICAL EXAM:  Objective    Ht 1.575 m (5' 2\")   Wt 124.3 kg (274 lb)   LMP 10/09/2023   BMI 50.12 kg/m      Vitals - Patient Reported  Pain Score: No Pain (0)        GENERAL: Healthy, alert and no distress  EYES: Eyes grossly normal to inspection.  No discharge or erythema, or obvious scleral/conjunctival abnormalities.  RESP: " No audible wheeze, cough, or visible cyanosis.  No visible retractions or increased work of breathing.    SKIN: Visible skin clear. No significant rash, abnormal pigmentation or lesions.  NEURO: Cranial nerves grossly intact.  Mentation and speech appropriate for age.  PSYCH: Mentation appears normal, affect normal/bright, judgement and insight intact, normal speech and appearance well-groomed.        Sincerely,    Lynn Hewitt PA-C      49 minutes spent by me on the date of the encounter doing chart review, history and exam, documentation and further activities per the note

## 2023-12-01 NOTE — NURSING NOTE
Is the patient currently in the state of [place]? YES    Visit mode:VIDEO    If the visit is dropped, the patient can be reconnected by: VIDEO VISIT: Send to e-mail at: [email or web address]    Will anyone else be joining the visit? NO  (If patient encounters technical issues they should call [phone])    How would you like to obtain your AVS? MyChart    Are changes needed to the allergy or medication list? No    Reason for visit: ARMIDA ANTONIO

## 2023-12-01 NOTE — PROGRESS NOTES
Virtual Visit Details    Type of service:  Video Visit   Video Start Time: 1200  Video End Time:1230    Originating Location (pt. Location): Home    Distant Location (provider location):  Off-site  Platform used for Video Visit: [employer]

## 2023-12-01 NOTE — PROGRESS NOTES
Return Medical Weight Management Note     Nevin Alvarado  MRN:  [id number]  :  1990  MOR:  2023    Dear Latonya Knight MD,    I had the pleasure of seeing your patient Nevin Alvarado. She is a 33 year old female who I am continuing to see for treatment of obesity related to:        2023    12:48 PM   --   I have the following health issues associated with obesity Type II Diabetes    High Blood Pressure    Sleep Apnea    Polycystic Ovarian Syndrome    GERD (Reflux)    Fatty Liver    Asthma    Stress Incontinence       Assessment & Plan   Problem List Items Addressed This Visit       Class 3 severe obesity with serious comorbidity and body mass index (BMI) of 50.0 to 59.9 in adult, unspecified obesity type (H) - Primary      Plan  1. Start taking 1mg wegovy each week (because already taking rybelsus 14mg, will likely tolerate starting at 1mg wegovy. She understands there is a risk of worsened nausea or GERD from changing to this higher dose.)   2. Stop taking rybelsus  3. Discontinue 0.5mg wegovy   4. Follow up with Sharon in 2-3 months   5. Dietician scheduled for 2024  6. Keep up the excellent work!      INTERVAL HISTORY:  Taking rybelsis since 2023, except for month when taking wegovy (switched back to rybeslsus due to supply issues of wegovy)   Also taking metformin     Since last appointment: Has been to ED numerous times since last appointment, has ED treatment plan in place.   Took wegovy for 1 month, unsure if it helped.   Got covid 2 weeks ago, doing better now.   Feels that she has plateaued with weight   Has improved with activity level, is currently able to do more cleaning and activities around the house.     Wt Readings from Last 5 Encounters:   23 124.3 kg (274 lb)   23 124.3 kg (274 lb)   23 124.3 kg (274 lb)   23 124.3 kg (274 lb)   23 127 kg (280 lb)       Anti-obesity medication history    Current:   Rybelsus  14mg    Past/Failed/contraindicated:   Past: Wegovy 0.25mg- well tolerated, no side effects. Discontinued due to supply issues for 0.5mg dose    Failed:   topiramate- took in 2014 for mood- caused anorexia   Naltrexone - suicidal thoughts       GERD symptoms: no change     Constipation/Diarrhea: none     Recent diet changes: has weekly budget for groceries, does meal prepping, snacking less.     Recent exercise/activity changes: Increased activity, able to do cooking and cleaning, hasn't needed to use walker     Recent stressors: well managed     Recent sleep changes: okay, no changes. Wears CPAP    Vitamins/Labs: none needed today       CURRENT WEIGHT:   274 lbs 0 oz    Initial Weight (lbs): 309 lbs  Last Visits Weight: 124.3 kg (274 lb)  Cumulative weight loss (lbs): 35  Weight Loss Percentage: 11.33%         No data to display                Wt Readings from Last 5 Encounters:   12/01/23 124.3 kg (274 lb)   11/21/23 124.3 kg (274 lb)   11/17/23 124.3 kg (274 lb)   11/17/23 124.3 kg (274 lb)   11/05/23 127 kg (280 lb)           MEDICATIONS:   Current Outpatient Medications   Medication Sig Dispense Refill     albuterol (PROAIR HFA/PROVENTIL HFA/VENTOLIN HFA) 108 (90 Base) MCG/ACT inhaler Inhale 2 puffs into the lungs every 6 hours as needed for shortness of breath / dyspnea or wheezing 18 g 0     albuterol (PROVENTIL) (2.5 MG/3ML) 0.083% neb solution Take 1 vial (2.5 mg) by nebulization every 6 hours as needed for shortness of breath or wheezing 90 mL 0     [place] Saline Nasal No-Drip GEL Spray 1 Application in nostril daily Apply into each nare 2 times daily Place in front of each side of your nose and breathe in to distribute gel daily. - Each Nare 22 mL 11     brexpiprazole (REXULTI) 1 MG tablet Take 1 mg by mouth at bedtime       cetirizine (ZYRTEC) 10 MG tablet Take 1 tablet (10 mg) by mouth daily 30 tablet 0     Cholecalciferol (D3 HIGH POTENCY) 25 MCG (1000 UT) CAPS Take 50 mcg by mouth daily       clonazePAM  (KLONOPIN) 0.5 MG tablet Take 0.5mg daily PRN anxiety- 20 tablets per month, try to keep it to 3-4x per week       cyclobenzaprine (FLEXERIL) 10 MG tablet Take 1 tablet (10 mg) by mouth 3 times daily as needed for muscle spasms 20 tablet 0     ferrous sulfate (FEROSUL) 325 (65 Fe) MG tablet Take 1 tablet (325 mg) by mouth daily (with breakfast) 30 tablet 0     furosemide (LASIX) 20 MG tablet Take 20 mg by mouth daily       hydroxychloroquine (PLAQUENIL) 200 MG tablet Take 200 mg by mouth daily       metFORMIN (GLUCOPHAGE XR) 500 MG 24 hr tablet Take 1,000 mg by mouth daily (with breakfast)       montelukast (SINGULAIR) 10 MG tablet Take 10 mg by mouth every evening       nabumetone (RELAFEN) 750 MG tablet Take 750 mg by mouth 2 times daily       norethindrone (AYGESTIN) 5 MG tablet Take 5 mg by mouth daily       omeprazole (PRILOSEC) 40 MG DR capsule Take 1 capsule (40 mg) by mouth 2 times daily (before meals) 180 capsule 3     ondansetron (ZOFRAN-ODT) 4 MG ODT tab Take 1 tablet (4 mg) by mouth every 8 hours as needed for nausea 15 tablet 0     pregabalin (LYRICA) 100 MG capsule Take 100 mg by mouth every morning       pregabalin (LYRICA) 100 MG capsule Take 200 mg by mouth at bedtime       Respiratory Therapy Supplies (NEBULIZER) BRENDAN Nebulizer device.  Albuterol nebulization every 2 hours as needed for shortness of breath or wheezing. 1 each 0     Semaglutide (RYBELSUS) 14 MG tablet Take 14 mg by mouth daily 90 tablet 3     sodium chloride (OCEAN) 0.65 % nasal spray Spray 2 sprays in each side of the nose every 3 hours while awake. 44 mL 11     SUMAtriptan (IMITREX) 25 MG tablet Take 25 mg by mouth at onset of headache for migraine May repeat in 2 hours.       BANOPHEN 2-0.1 % external cream Apply 1 applicator topically 2 times daily as needed for itching (Patient not taking: Reported on 12/1/2023)       fluocinonide (LIDEX) 0.05 % external cream Apply 1 Application topically 2 times daily as needed Apply thinly  "to knee 2 times daily, Monday through Fridays, off on weekends as needed. Avoid face and skin folds. (Patient not taking: Reported on 12/1/2023)       Lidocaine (LIDOCARE) 4 % Patch Place 1 patch onto the skin every 24 hours To prevent lidocaine toxicity, patient should be patch free for 12 hrs daily. 10 patch 0     Nutritional Supplements (ENSURE MAX PROTEIN) LIQD Take 240 mLs by mouth daily 7200 mL 11     polyethylene glycol (MIRALAX) 17 g packet Take 17 g by mouth daily for 60 days 30 packet 1     Semaglutide-Weight Management (WEGOVY) 0.5 MG/0.5ML pen Inject 0.5 mg Subcutaneous every 7 days (Patient not taking: Reported on 12/1/2023) 2 mL 1     sucralfate (CARAFATE) 1 GM/10ML suspension Take 10 mLs (1 g) by mouth 4 times daily 400 mL 0     valACYclovir (VALTREX) 1000 mg tablet Take 2,000 mg by mouth 2 times daily as needed Take 2 tablets by mouth two times daily for one day. Use as needed at onset of cold sore.              No data to display                        9/13/2023    10:26 AM   BRIAN Score (Last Two)   BRIAN Raw Score 37   Activation Score 79.2   BRIAN Level 4         PHYSICAL EXAM:  Objective    Ht 1.575 m (5' 2\")   Wt 124.3 kg (274 lb)   LMP 10/09/2023   BMI 50.12 kg/m      Vitals - Patient Reported  Pain Score: No Pain (0)        GENERAL: Healthy, alert and no distress  EYES: Eyes grossly normal to inspection.  No discharge or erythema, or obvious scleral/conjunctival abnormalities.  RESP: No audible wheeze, cough, or visible cyanosis.  No visible retractions or increased work of breathing.    SKIN: Visible skin clear. No significant rash, abnormal pigmentation or lesions.  NEURO: Cranial nerves grossly intact.  Mentation and speech appropriate for age.  PSYCH: Mentation appears normal, affect normal/bright, judgement and insight intact, normal speech and appearance well-groomed.        Sincerely,    Lynn Hewitt PA-C      49 minutes spent by me on the date of the encounter doing chart review, " history and exam, documentation and further activities per the note

## 2023-12-01 NOTE — PATIENT INSTRUCTIONS
"Thank you for allowing us the privilege of caring for you. We hope we provided you with the excellent service you deserve.   Please let us know if there is anything else we can do for you so that we can be sure you are completely satisfied with your care experience.    To ensure the quality of our services you may be receiving a patient satisfaction survey from an independent patient satisfaction monitoring company.    The greatest compliment you can give is a \"Likely to Recommend\"    Your visit was with Sharon Toro CNP, and Lynn Hewitt PA-C today.    Instructions per today's visit:     Jay Rooney, it was great to visit with you today.  Here is a review of our visit.  If our clinic scheduler is not able to reach you please call [phone] to schedule your next appointments.    Plan  Start taking 1mg wegovy each week  Stop taking your rybelsus  Discontinue 0.5mg wegovy   Follow up with Sharon in 2-3 months   Dietician scheduled for 1/26/2024  Keep up the excellent work!        Information about Video Visits with [email or web address]: video visit information  _________________________________________________________________________________________________________________________________________________________  If you are asked by your clinic team to have your blood pressure checked:  [facility] do offer several locations for blood pressure checks. Please follow the below link to schedule an appointment. Scheduling an appointment at the pharmacy for a blood pressure check is now preferred.    Appointment Plus (appointment-plus.[employer])  _________________________________________________________________________________________________________________________________________________________  Important contact and scheduling information:  Please call our contact center at [phone] to schedule your next appointments.  To find a lab location near you, please call [phone].  For any nursing questions or " concerns call Kathy Rene LPN at [phone] or Rhina Garcia RN at [phone]  Please call during clinic hours Monday through Friday 8:00a - 4:00p if you have questions or you can contact us via [employer] at anytime and we will reply during clinic hours.    Lab results will be communicated through My Chart or letter (if My Chart not used). Please call the clinic if you have not received communication after 1 week or if you have any questions.?  Clinic Fax: [phone]    _________________________________________________________________________________________________________________________________________________________  Meal Replacement Products:    Here is the link to our new e-store where you can purchase our meal replacement products    [facility] E-Store  [employer] [facility] [employer]/store    The one week starter kit is a great way to sample a variety of products and see what works for you.    If you want more information about the product go to: [employer]    If you are an employee or [facility] or [facility] please contact your care team for a 10% estore discount    Free Shipping for orders over $75     Benefits of meal replacements products:    Portion and calorie control  Improved nutrition  Structured eating  Simplified food choices  Avoid contact with trigger foods  _________________________________________________________________________________________________________________________________________________________  Interested in working with a health ?  Health coaches work with you to improve your overall health and wellbeing.  They look at the whole person, and may involve discussion of different areas of life, including, but not limited to the four pillars of health (sleep, exercise, nutrition, and stress management). Discuss with your care team if you would like to start working a health .  Health Coaching-3 Pack: Schedule by  calling [phone]    $99 for three health coaching visits    Visits may be done in person or via phone    Coaching is a partnership between the  and the client; Coaches do not prescribe or diagnose    Coaching helps inspire the client to reach his/her personal goals   _________________________________________________________________________________________________________________________________________________________  24 Week Healthy Lifestyle Plan:    Our mission in the 24-week Healthy Lifestyle Plan is to provide you with individualized care by giving you the tools, education and support you need to lose weight and maintain a healthy lifestyle. In your 24-week journey, you ll be supported by a dedicated weight loss team that includes registered dietitians, medical weight management providers, health coaches, and nurses -- all with special expertise in weight loss -- to help you every step of the way.     Monthly meetings with your registered dietician or medical weight management provider help to review your progress, update your care plan, and make any adjustments needed to ensure success. Between these visits, weekly and bi-weekly health  visits will help you focus on the four pillars of weight loss -- stress, sleep, nutrition, and exercise -- and how you can best adapt each to achieve sustainable weight loss results.    In addition, you will be given exclusive access to online wellbeing classes through [employer].  Your initial visit will be with a medical weight management provider who will help to understand your weight loss goals and ensure this program is the right fit for you. Please let our team know if you are interested in the 24 week plan by sending a message to your care team or calling [phone] to schedule.  _________________________________________________________________________________________________________________________________________________________  __________  [facility] Get Moving Program  Our team of physical therapists is trained to help you understand and take control of your condition. They will perform a thorough evaluation to determine your ability for activity and develop a customized plan to fit your goals and physical ability.  Scheduling: Unsure if the Get Moving program is right for you? Discuss the program with your medical provider or diabetes educator. You can also call us at [phone] to ask questions or schedule an appointment.   [facility] Get Moving Program  ____________________________________________________________________________________________________________________________________________________________________________  [facility] Diabetes Prevention Program (DPP)  If you have prediabetes and Medicare please contact us via [employer] to learn more about the Diabetes Prevention Program (DPP)  Program Details:  [facility] offers the year-long Diabetes Prevention Program (DPP). The program helps you to make lifestyle changes that prevent or delay type 2 diabetes by supporting healthy eating, increased physical activity, stress reduction and use of coping skills.   On average, previous [facility] DPP cohorts have lost and maintained at least 5% of their starting weight throughout the program and averaged more than 150 minutes of physical activity per week.  Participants meet weekly for one-hour group sessions over sixteen weeks, every other week for the next 8 weeks, and monthly for the last six months.   A year-long maintenance program is also available for participants who complete the first year.   Location & Cost:   During the COVID-19 Public Health Emergency, the program is offered virtually. When in-person classes can resume, they will be held at [facility].  For people with Medicare, the program is covered in full. A self-pay option will also be available for those with non-Medicare  insurance plans.   ______________________________________________________________________________________________________________________________________________________________________________________________________________________________    To work with a Behavioral Health Psychologist:    Call to schedule:    Isai Lindsey - [phone]  Vj Scales - [phone]  Carli Perea - [phone]  Cassia Jung - [phone]   Roya Zamora PhD (cannot accept Medicare) [phone]        Thank gabino,   [facility] Comprehensive Weight Management Team

## 2023-12-06 NOTE — ED NOTES
Pt reports her and her partner were using oils and vibrators with sexual activity tonight and she is now having abdominal pain    Patient requests all Lab, Cardiology, and Radiology Results on their Discharge Instructions

## 2023-12-13 ENCOUNTER — HOSPITAL ENCOUNTER (EMERGENCY)
Facility: HOSPITAL | Age: 33
Discharge: HOME | End: 2023-12-13
Attending: EMERGENCY MEDICINE | Admitting: EMERGENCY MEDICINE
Payer: COMMERCIAL

## 2023-12-13 VITALS
WEIGHT: 274 LBS | RESPIRATION RATE: 20 BRPM | BODY MASS INDEX: 50.12 KG/M2 | SYSTOLIC BLOOD PRESSURE: 129 MMHG | HEART RATE: 121 BPM | DIASTOLIC BLOOD PRESSURE: 72 MMHG | TEMPERATURE: 98.4 F | OXYGEN SATURATION: 97 %

## 2023-12-13 DIAGNOSIS — U07.1 COVID-19: ICD-10-CM

## 2023-12-13 LAB
ALBUMIN SERPL BCG-MCNC: 4.4 G/DL (ref 3.5–5.2)
ALBUMIN UR-MCNC: 30 MG/DL
ALP SERPL-CCNC: 86 U/L (ref 40–150)
ALT SERPL W P-5'-P-CCNC: 27 U/L (ref 0–50)
ANION GAP SERPL CALCULATED.3IONS-SCNC: 13 MMOL/L (ref 7–15)
APPEARANCE UR: CLEAR
AST SERPL W P-5'-P-CCNC: 25 U/L (ref 0–45)
BACTERIA #/AREA URNS HPF: ABNORMAL /HPF
BASOPHILS # BLD AUTO: 0 10E3/UL (ref 0–0.2)
BASOPHILS NFR BLD AUTO: 0 %
BILIRUB SERPL-MCNC: 0.4 MG/DL
BILIRUB UR QL STRIP: NEGATIVE
BUN SERPL-MCNC: 12.6 MG/DL (ref 6–20)
CALCIUM SERPL-MCNC: 9.2 MG/DL (ref 8.6–10)
CHLORIDE SERPL-SCNC: 106 MMOL/L (ref 98–107)
COLOR UR AUTO: YELLOW
CREAT SERPL-MCNC: 0.53 MG/DL (ref 0.51–0.95)
CRP SERPL-MCNC: 54.1 MG/L
DEPRECATED HCO3 PLAS-SCNC: 22 MMOL/L (ref 22–29)
EGFRCR SERPLBLD CKD-EPI 2021: >90 ML/MIN/1.73M2
EOSINOPHIL # BLD AUTO: 0.1 10E3/UL (ref 0–0.7)
EOSINOPHIL NFR BLD AUTO: 1 %
ERYTHROCYTE [DISTWIDTH] IN BLOOD BY AUTOMATED COUNT: 13.4 % (ref 10–15)
FLUAV RNA SPEC QL NAA+PROBE: NEGATIVE
FLUBV RNA RESP QL NAA+PROBE: NEGATIVE
GLUCOSE SERPL-MCNC: 96 MG/DL (ref 70–99)
GLUCOSE UR STRIP-MCNC: NEGATIVE MG/DL
HCT VFR BLD AUTO: 42.7 % (ref 35–47)
HGB BLD-MCNC: 14.6 G/DL (ref 11.7–15.7)
HGB UR QL STRIP: NEGATIVE
HOLD SPECIMEN: NORMAL
IMM GRANULOCYTES # BLD: 0 10E3/UL
IMM GRANULOCYTES NFR BLD: 0 %
KETONES UR STRIP-MCNC: 80 MG/DL
LEUKOCYTE ESTERASE UR QL STRIP: NEGATIVE
LIPASE SERPL-CCNC: 20 U/L (ref 13–60)
LYMPHOCYTES # BLD AUTO: 0.5 10E3/UL (ref 0.8–5.3)
LYMPHOCYTES NFR BLD AUTO: 8 %
MCH RBC QN AUTO: 30.6 PG (ref 26.5–33)
MCHC RBC AUTO-ENTMCNC: 34.2 G/DL (ref 31.5–36.5)
MCV RBC AUTO: 90 FL (ref 78–100)
MONOCYTES # BLD AUTO: 0.3 10E3/UL (ref 0–1.3)
MONOCYTES NFR BLD AUTO: 5 %
MUCOUS THREADS #/AREA URNS LPF: PRESENT /LPF
NEUTROPHILS # BLD AUTO: 5 10E3/UL (ref 1.6–8.3)
NEUTROPHILS NFR BLD AUTO: 86 %
NITRATE UR QL: NEGATIVE
NRBC # BLD AUTO: 0 10E3/UL
NRBC BLD AUTO-RTO: 0 /100
PH UR STRIP: 5.5 [PH] (ref 5–7)
PLATELET # BLD AUTO: 258 10E3/UL (ref 150–450)
POTASSIUM SERPL-SCNC: 4.2 MMOL/L (ref 3.4–5.3)
PROT SERPL-MCNC: 7.2 G/DL (ref 6.4–8.3)
RBC # BLD AUTO: 4.77 10E6/UL (ref 3.8–5.2)
RBC URINE: 1 /HPF
RSV RNA SPEC NAA+PROBE: NEGATIVE
SARS-COV-2 RNA RESP QL NAA+PROBE: POSITIVE
SODIUM SERPL-SCNC: 141 MMOL/L (ref 135–145)
SP GR UR STRIP: 1.03 (ref 1–1.03)
SQUAMOUS EPITHELIAL: 1 /HPF
UROBILINOGEN UR STRIP-MCNC: 2 MG/DL
WBC # BLD AUTO: 5.9 10E3/UL (ref 4–11)
WBC URINE: 2 /HPF

## 2023-12-13 PROCEDURE — 80053 COMPREHEN METABOLIC PANEL: CPT | Performed by: PHYSICIAN ASSISTANT

## 2023-12-13 PROCEDURE — 99284 EMERGENCY DEPT VISIT MOD MDM: CPT | Mod: 25

## 2023-12-13 PROCEDURE — 96374 THER/PROPH/DIAG INJ IV PUSH: CPT

## 2023-12-13 PROCEDURE — 85025 COMPLETE CBC W/AUTO DIFF WBC: CPT | Performed by: PHYSICIAN ASSISTANT

## 2023-12-13 PROCEDURE — 96361 HYDRATE IV INFUSION ADD-ON: CPT

## 2023-12-13 PROCEDURE — 80053 COMPREHEN METABOLIC PANEL: CPT | Performed by: EMERGENCY MEDICINE

## 2023-12-13 PROCEDURE — 36415 COLL VENOUS BLD VENIPUNCTURE: CPT | Performed by: STUDENT IN AN ORGANIZED HEALTH CARE EDUCATION/TRAINING PROGRAM

## 2023-12-13 PROCEDURE — 85025 COMPLETE CBC W/AUTO DIFF WBC: CPT | Performed by: EMERGENCY MEDICINE

## 2023-12-13 PROCEDURE — 87637 SARSCOV2&INF A&B&RSV AMP PRB: CPT | Performed by: EMERGENCY MEDICINE

## 2023-12-13 PROCEDURE — 81001 URINALYSIS AUTO W/SCOPE: CPT | Performed by: EMERGENCY MEDICINE

## 2023-12-13 PROCEDURE — 258N000003 HC RX IP 258 OP 636: Performed by: STUDENT IN AN ORGANIZED HEALTH CARE EDUCATION/TRAINING PROGRAM

## 2023-12-13 PROCEDURE — 250N000011 HC RX IP 250 OP 636: Performed by: STUDENT IN AN ORGANIZED HEALTH CARE EDUCATION/TRAINING PROGRAM

## 2023-12-13 PROCEDURE — 83690 ASSAY OF LIPASE: CPT | Performed by: PHYSICIAN ASSISTANT

## 2023-12-13 PROCEDURE — 86140 C-REACTIVE PROTEIN: CPT | Performed by: PHYSICIAN ASSISTANT

## 2023-12-13 PROCEDURE — 87637 SARSCOV2&INF A&B&RSV AMP PRB: CPT | Performed by: STUDENT IN AN ORGANIZED HEALTH CARE EDUCATION/TRAINING PROGRAM

## 2023-12-13 RX ORDER — ONDANSETRON 2 MG/ML
4 INJECTION INTRAMUSCULAR; INTRAVENOUS
Status: COMPLETED | OUTPATIENT
Start: 2023-12-13 | End: 2023-12-13

## 2023-12-13 RX ADMIN — SODIUM CHLORIDE 500 ML: 9 INJECTION, SOLUTION INTRAVENOUS at 15:22

## 2023-12-13 RX ADMIN — ONDANSETRON 4 MG: 2 INJECTION INTRAMUSCULAR; INTRAVENOUS at 15:23

## 2023-12-13 ASSESSMENT — ACTIVITIES OF DAILY LIVING (ADL)
ADLS_ACUITY_SCORE: 40
ADLS_ACUITY_SCORE: 38

## 2023-12-13 NOTE — ED PROVIDER NOTES
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 History     Tobacco Use    Smoking status: Never    Smokeless tobacco: Never   Substance Use Topics    Alcohol use: No     Alcohol/week: 0.0 standard drinks of alcohol    Drug use: No       albuterol (PROAIR HFA/PROVENTIL HFA/VENTOLIN HFA) 108 (90 Base) MCG/ACT inhaler  albuterol (PROVENTIL) (2.5 MG/3ML) 0.083% neb solution  [place] Saline Nasal  No-Drip GEL  BANOPHEN 2-0.1 % external cream  brexpiprazole (REXULTI) 1 MG tablet  cetirizine (ZYRTEC) 10 MG tablet  Cholecalciferol (D3 HIGH POTENCY) 25 MCG (1000 UT) CAPS  clonazePAM (KLONOPIN) 0.5 MG tablet  cyclobenzaprine (FLEXERIL) 10 MG tablet  ferrous sulfate (FEROSUL) 325 (65 Fe) MG tablet  fluocinonide (LIDEX) 0.05 % external cream  furosemide (LASIX) 20 MG tablet  hydroxychloroquine (PLAQUENIL) 200 MG tablet  metFORMIN (GLUCOPHAGE XR) 500 MG 24 hr tablet  montelukast (SINGULAIR) 10 MG tablet  nabumetone (RELAFEN) 750 MG tablet  norethindrone (AYGESTIN) 5 MG tablet  omeprazole (PRILOSEC) 40 MG DR capsule  ondansetron (ZOFRAN-ODT) 4 MG ODT tab  polyethylene glycol (MIRALAX) 17 g packet  pregabalin (LYRICA) 100 MG capsule  pregabalin (LYRICA) 100 MG capsule  Respiratory Therapy Supplies (NEBULIZER) BRENDAN  Semaglutide (RYBELSUS) 14 MG tablet  Semaglutide-Weight Management (WEGOVY) 1 MG/0.5ML pen  sodium chloride (OCEAN) 0.65 % nasal spray  sucralfate (CARAFATE) 1 GM/10ML suspension  SUMAtriptan (IMITREX) 25 MG tablet  valACYclovir (VALTREX) 1000 mg tablet          PHYSICAL EXAM     First Vitals:  Patient Vitals for the past 24 hrs:   BP Temp Temp src Pulse Resp SpO2 Weight   12/13/23 1616 -- -- -- -- 20 -- --   12/13/23 1513 -- -- -- -- 20 -- --   12/13/23 1407 129/72 98.4  F (36.9  C) Oral (!) 121 28 97 % 124.3 kg (274 lb)         PHYSICAL EXAM:   Constitutional: No acute distress.  Neuro: Awake and alert. No focal deficits.  Psych: Calm and cooperative.  Head: Normocephalic.  Eyes: PERRL. EOMI. Conjunctivae clear. No crusting or mattering of the lids or lashes.    Nose: Nostrils patent. No congestion or turbinate inflammation.   Mouth: Pink and moist. No erythema or edema of the posterior pharynx. No exudates. No trismus or muffled voice. Handling own secretions. No uvula deviation.   Neck: FROM.   Cardio: . Adequate perfusion to extremities. Regular rhythm. No murmurs.  Pulmonary: Oxygenating well on  RA. No labored breathing. CTA b/l.  Abdomen: Generalized abdominal pain with palpation.  Upper extremities: Moves freely.   Lower extremities: Moves freely. No edema.   Skin: Natural color. Warm, dry, intact.       RESULTS     LAB:  All pertinent labs reviewed and interpreted  Labs Ordered and Resulted from Time of ED Arrival to Time of ED Departure   INFLUENZA A/B, RSV, & SARS-COV2 PCR - Abnormal       Result Value    Influenza A PCR Negative      Influenza B PCR Negative      RSV PCR Negative      SARS CoV2 PCR Positive (*)    CBC WITH PLATELETS AND DIFFERENTIAL - Abnormal    WBC Count 5.9      RBC Count 4.77      Hemoglobin 14.6      Hematocrit 42.7      MCV 90      MCH 30.6      MCHC 34.2      RDW 13.4      Platelet Count 258      % Neutrophils 86      % Lymphocytes 8      % Monocytes 5      % Eosinophils 1      % Basophils 0      % Immature Granulocytes 0      NRBCs per 100 WBC 0      Absolute Neutrophils 5.0      Absolute Lymphocytes 0.5 (*)     Absolute Monocytes 0.3      Absolute Eosinophils 0.1      Absolute Basophils 0.0      Absolute Immature Granulocytes 0.0      Absolute NRBCs 0.0     COMPREHENSIVE METABOLIC PANEL - Normal    Sodium 141      Potassium 4.2      Carbon Dioxide (CO2) 22      Anion Gap 13      Urea Nitrogen 12.6      Creatinine 0.53      GFR Estimate >90      Calcium 9.2      Chloride 106      Glucose 96      Alkaline Phosphatase 86      AST 25      ALT 27      Protein Total 7.2      Albumin 4.4      Bilirubin Total 0.4     LIPASE - Normal    Lipase 20     ROUTINE [facility] WITH MICROSCOPIC REFLEX TO CULTURE   GLUCOSE MONITOR NURSING POCT   CRP INFLAMMATION       RADIOLOGY:  No orders to display       ECG:  N/A      PROCEDURES   None       Some or all of this documentation has been completed using dictation software and mild grammatical errors may be present. Please contact me with any concerns regarding this.       Dilcia Green PA-C  Emergency Medicine   [facility]  EMERGENCY DEPARTMENT       Dilcia Green PA-C  12/13/23 0291

## 2023-12-13 NOTE — ED TRIAGE NOTES
24 hrs of chills/sweats/body aches/nausea. Pt took tylenol at 1230 this afternoon.     Triage Assessment (Adult)       Row Name 12/13/23 1405          Triage Assessment    Airway WDL WDL        Respiratory WDL    Respiratory WDL WDL        Skin Circulation/Temperature WDL    Skin Circulation/Temperature WDL X  sweats/chills        Cardiac WDL    Cardiac WDL X  tachycardia        Peripheral/Neurovascular WDL    Peripheral Neurovascular WDL WDL        Cognitive/Neuro/Behavioral WDL    Cognitive/Neuro/Behavioral WDL X     Mood/Behavior restless

## 2023-12-13 NOTE — ED PROVIDER NOTES
Emergency Department Staff Physician Note     I had a face to face encounter with this patient seen by the Advanced Practice Provider (MARYCRUZ).  I have seen, examined, and discussed the patient with the MARYCRUZ and agree with their assessment and plan of management.    Relevant HPI:     Nevin Alvarado is a 33 year old female who presents to the Emergency Department for evaluation of generalized body aches. Over the last couple days, the patient has been having generalized bodyaches, chills, abdominal pain, and nausea.    I, Jason Street, am serving as a scribe to document services personally performed by Bird Valles M.D., based on my observations and the provider's statements to me.   I, Bird Valles M.D., attest that Jason Street was acting in a scribe capacity, has observed my performance of the services and has documented them in accordance with my direction.    ED Course:  4:32 PM I received the patient report from the MARYCRUZ, Dilcia Green PA-C. I agree with their assessment and plan of management, and I will see the patient.  5:55 PM.  I met with the patient to introduce myself, gather additional history, perform my initial exam, and discuss the plan.  Urine analysis without evidence of infection.  Patient reports testing negative for COVID relatively recently indicating her positive test today indicates reinfection.  Patient concerned about frequent exposures with staff at her care facility.  She has requested they wear mask but they are unresponsive.  Case referred to care manager for input.    Brief Physical Exam:  /72   Pulse (!) 121   Temp 98.4  F (36.9  C) (Oral)   Resp 20   Wt 124.3 kg (274 lb)   LMP 10/09/2023 (Exact Date)   SpO2 97%   BMI 50.12 kg/m       Constitutional: Obese female in mild distress EYES: Conjunctivae clear  HENT:  Atraumatic, normocephalic  Respiratory:  No respiratory distress, normal breath sounds  Cardiovascular:  Normal rate, normal rhythm, no murmurs, capillary refill  normal.   GI:  Soft, nondistended, nontender, no palpable masses, no rebound, no guarding   Musculoskeletal:  No edema.  No cyanosis.  Range of motion major extremities intact.    Integument: Warm, Dry, No erythema, No rash.   Neurologic:  Alert & oriented, no focal deficits noted, ambulatory  Psych: Affect normal, Mood normal.     Impression / ED Plan:  I personally saw the patient and performed a substantive portion of the visit including all aspects of the medical decision making.   Results for orders placed or performed during the hospital encounter of 12/13/23   Symptomatic Influenza A/B, RSV, & SARS-CoV2 PCR (COVID-19) Nasopharyngeal     Status: Abnormal    Specimen: Nasopharyngeal; Swab   Result Value Ref Range    Influenza A PCR Negative Negative    Influenza B PCR Negative Negative    RSV PCR Negative Negative    SARS CoV2 PCR Positive (A) Negative    Narrative    Testing was performed using the Xpert Xpress CoV2/Flu/RSV Assay on the [email or web address] GeneXpert Instrument. This test should be ordered for the detection of SARS-CoV-2, influenza, and RSV viruses in individuals who meet clinical and/or epidemiological criteria. Test performance is unknown in asymptomatic patients. This test is for in vitro diagnostic use under the FDA EUA for laboratories certified under CLIA to perform high or moderate complexity testing. This test has not been FDA cleared or approved. A negative result does not rule out the presence of PCR inhibitors in the specimen or target RNA in concentration below the limit of detection for the assay. If only one viral target is positive but coinfection with multiple targets is suspected, the sample should be re-tested with another FDA cleared, approved, or authorized test, if coinfection would change clinical management. This test was validated by the [facility]. These laboratories are certified under the Clinical Laboratory Improvement Amendments of 1988 (CLIA-88) as  qualified to perform high complexity laboratory testing.   UA with Microscopic reflex to Culture     Status: Abnormal    Specimen: Urine, Midstream   Result Value Ref Range    Color Urine Yellow Colorless, Straw, Light Yellow, Yellow    Appearance Urine Clear Clear    Glucose Urine Negative Negative mg/dL    Bilirubin Urine Negative Negative    Ketones Urine 80 (A) Negative mg/dL    Specific Gravity Urine 1.035 (H) 1.001 - 1.030    Blood Urine Negative Negative    pH Urine 5.5 5.0 - 7.0    Protein Albumin Urine 30 (A) Negative mg/dL    Urobilinogen Urine 2.0 (A) <2.0 mg/dL    Nitrite Urine Negative Negative    Leukocyte Esterase Urine Negative Negative    Bacteria Urine Few (A) None Seen /HPF    Mucus Urine Present (A) None Seen /LPF    RBC Urine 1 <=2 /HPF    WBC Urine 2 <=5 /HPF    Squamous Epithelials Urine 1 <=1 /HPF    Narrative    Urine Culture not indicated   Excelsior Draw     Status: None    Narrative    The following orders were created for panel order Excelsior Draw.  Procedure                               Abnormality         Status                     ---------                               -----------         ------                     Extra Blue Top Tube[[id number]]                              Final result               Extra Red Top Tube[[id number]]                               Final result               Extra Green Top (Lithium...[[id number]]                      Final result               Extra Purple Top Tube[[id number]]                            Final result                 Please view results for these tests on the individual orders.   Extra Blue Top Tube     Status: None   Result Value Ref Range    Hold Specimen JIC    Extra Red Top Tube     Status: None   Result Value Ref Range    Hold Specimen JIC    Extra Green Top (Lithium Heparin) Tube     Status: None   Result Value Ref Range    Hold Specimen JIC    Extra Purple Top Tube     Status: None   Result Value Ref Range    Hold Specimen JIC     Comprehensive metabolic panel     Status: Normal   Result Value Ref Range    Sodium 141 135 - 145 mmol/L    Potassium 4.2 3.4 - 5.3 mmol/L    Carbon Dioxide (CO2) 22 22 - 29 mmol/L    Anion Gap 13 7 - 15 mmol/L    Urea Nitrogen 12.6 6.0 - 20.0 mg/dL    Creatinine 0.53 0.51 - 0.95 mg/dL    GFR Estimate >90 >60 mL/min/1.73m2    Calcium 9.2 8.6 - 10.0 mg/dL    Chloride 106 98 - 107 mmol/L    Glucose 96 70 - 99 mg/dL    Alkaline Phosphatase 86 40 - 150 U/L    AST 25 0 - 45 U/L    ALT 27 0 - 50 U/L    Protein Total 7.2 6.4 - 8.3 g/dL    Albumin 4.4 3.5 - 5.2 g/dL    Bilirubin Total 0.4 <=1.2 mg/dL   Lipase     Status: Normal   Result Value Ref Range    Lipase 20 13 - 60 U/L   CBC with platelets and differential     Status: Abnormal   Result Value Ref Range    WBC Count 5.9 4.0 - 11.0 10e3/uL    RBC Count 4.77 3.80 - 5.20 10e6/uL    Hemoglobin 14.6 11.7 - 15.7 g/dL    Hematocrit 42.7 35.0 - 47.0 %    MCV 90 78 - 100 fL    MCH 30.6 26.5 - 33.0 pg    MCHC 34.2 31.5 - 36.5 g/dL    RDW 13.4 10.0 - 15.0 %    Platelet Count 258 150 - 450 10e3/uL    % Neutrophils 86 %    % Lymphocytes 8 %    % Monocytes 5 %    % Eosinophils 1 %    % Basophils 0 %    % Immature Granulocytes 0 %    NRBCs per 100 WBC 0 <1 /100    Absolute Neutrophils 5.0 1.6 - 8.3 10e3/uL    Absolute Lymphocytes 0.5 (L) 0.8 - 5.3 10e3/uL    Absolute Monocytes 0.3 0.0 - 1.3 10e3/uL    Absolute Eosinophils 0.1 0.0 - 0.7 10e3/uL    Absolute Basophils 0.0 0.0 - 0.2 10e3/uL    Absolute Immature Granulocytes 0.0 <=0.4 10e3/uL    Absolute NRBCs 0.0 10e3/uL   CRP inflammation     Status: Abnormal   Result Value Ref Range    CRP Inflammation 54.10 (H) <5.00 mg/L   CBC with platelets + differential     Status: Abnormal    Narrative    The following orders were created for panel order CBC with platelets + differential.  Procedure                               Abnormality         Status                     ---------                               -----------         ------                      CBC with platelets and d...[[id number]]  Abnormal            Final result                 Please view results for these tests on the individual orders.      Nevni Alvarado is a 33 year old female presents to the ED for evaluation of generalized body aches.  Symptoms ongoing for a few days.  Patient with recent negative test for COVID after prolonged positive testing.  Patient reports significant exposures at her care facility.  Care assumed from PA at her departure.  Urine analysis pending at time of departure.  This was unremarkable.  Viral swab is positive for COVID once again.  Likely explains her symptomatology.    MIS Valles M.D.  Emergency Medicine  [facility] EMERGENCY DEPARTMENT     Bird Valles MD  12/13/23 3930

## 2023-12-15 ENCOUNTER — TELEPHONE (OUTPATIENT)
Dept: ENDOCRINOLOGY | Facility: CLINIC | Age: 33
End: 2023-12-15
Payer: COMMERCIAL

## 2023-12-15 DIAGNOSIS — E66.813 CLASS 3 SEVERE OBESITY WITH SERIOUS COMORBIDITY AND BODY MASS INDEX (BMI) OF 50.0 TO 59.9 IN ADULT, UNSPECIFIED OBESITY TYPE (H): ICD-10-CM

## 2023-12-15 DIAGNOSIS — E66.01 CLASS 3 SEVERE OBESITY WITH SERIOUS COMORBIDITY AND BODY MASS INDEX (BMI) OF 50.0 TO 59.9 IN ADULT, UNSPECIFIED OBESITY TYPE (H): ICD-10-CM

## 2023-12-31 ENCOUNTER — APPOINTMENT (OUTPATIENT)
Dept: GENERAL RADIOLOGY | Facility: CLINIC | Age: 33
End: 2023-12-31
Attending: EMERGENCY MEDICINE
Payer: COMMERCIAL

## 2023-12-31 ENCOUNTER — HOSPITAL ENCOUNTER (EMERGENCY)
Facility: CLINIC | Age: 33
Discharge: NURSING, REHAB OR LONG-TERM CARE | End: 2023-12-31
Attending: EMERGENCY MEDICINE | Admitting: EMERGENCY MEDICINE
Payer: COMMERCIAL

## 2023-12-31 VITALS
OXYGEN SATURATION: 100 % | RESPIRATION RATE: 18 BRPM | TEMPERATURE: 98.4 F | DIASTOLIC BLOOD PRESSURE: 87 MMHG | HEART RATE: 79 BPM | SYSTOLIC BLOOD PRESSURE: 135 MMHG

## 2023-12-31 DIAGNOSIS — B33.8 RSV INFECTION: ICD-10-CM

## 2023-12-31 LAB
FLUAV RNA SPEC QL NAA+PROBE: NEGATIVE
FLUBV RNA RESP QL NAA+PROBE: NEGATIVE
RSV RNA SPEC NAA+PROBE: POSITIVE
SARS-COV-2 RNA RESP QL NAA+PROBE: NEGATIVE

## 2023-12-31 PROCEDURE — 71046 X-RAY EXAM CHEST 2 VIEWS: CPT

## 2023-12-31 PROCEDURE — 99284 EMERGENCY DEPT VISIT MOD MDM: CPT | Mod: 25

## 2023-12-31 PROCEDURE — 87637 SARSCOV2&INF A&B&RSV AMP PRB: CPT | Performed by: EMERGENCY MEDICINE

## 2023-12-31 RX ORDER — PSEUDOEPHEDRINE HCL 60 MG
60 TABLET ORAL EVERY 4 HOURS PRN
Qty: 20 TABLET | Refills: 0 | Status: SHIPPED | OUTPATIENT
Start: 2023-12-31 | End: 2024-03-30

## 2023-12-31 RX ORDER — PSEUDOEPHEDRINE HCL 60 MG
60 TABLET ORAL EVERY 4 HOURS PRN
Qty: 20 TABLET | Refills: 0 | Status: SHIPPED | OUTPATIENT
Start: 2023-12-31 | End: 2023-12-31

## 2023-12-31 RX ORDER — ACETAMINOPHEN 500 MG
1000 TABLET ORAL EVERY 6 HOURS PRN
Qty: 60 TABLET | Refills: 0 | Status: ON HOLD | OUTPATIENT
Start: 2023-12-31 | End: 2024-04-16

## 2023-12-31 RX ORDER — ACETAMINOPHEN 500 MG
1000 TABLET ORAL EVERY 6 HOURS PRN
Qty: 60 TABLET | Refills: 0 | Status: SHIPPED | OUTPATIENT
Start: 2023-12-31 | End: 2023-12-31

## 2023-12-31 NOTE — ED PROVIDER NOTES
History     Chief Complaint:  Cough       The history is provided by the patient.      Nevin Alvarado is a 33 year old female with history of pneumonia, reactive airway disease, asthma, and diabetes mellitus type 2 who presents to the ED for cough. The patient reports that she has been experiencing a sore throat for the past few days and is now developing a cough. She states that she is experiencing chest pain and shoulder blade pain with respirations. She reports she is also experiencing congestion. She state that she is experiencing congestion. She states that she is experiencing vomiting at night when she is coughing due to dryness and body aches.  She adds that she lives at a group home and residents along with staff have been coughing there. She notes that she wears a CPAP with a heated tube.    Independent Historian:   None - Patient Only    Review of External Notes:          Medications:    Sumatriptan   Pregabalin  Albuterol HFA inhaler   Cetirizine  Ondansetron  FeroSuL   Cholecalciferol  Albuterol neb   Albuterol-ipratropium   Norethindrone  Metoclopramide HCl   Montelukast  Furosemide  Guaifenesin  Nabumetone  Omeprazole  Metformin   Valacyclovir  Semaglutide    Past Medical History:    ADD (attention deficit disorder)  Anorexia nervosa with bulimia (H28)  Anxiety  Asthma  Borderline personality disorder (H)  Depression  Eating disorder  Self-harm  Suicide attempt  Morbid obesity  Neuropathy  Obesity  PTSD (post-traumatic stress disorder)  Pulmonary emboli (H)  Rectal foreign body - Recurrent issue, self placed  Self-injurious behavior  Sleep apnea  Suicidal overdose (H)  Swallowed foreign body - Recurrent issue, self placed  Syncope  Gallstones  Esophageal stricture  Endometriosis  GERD (gastroesophageal reflux disease)  Sensorineural hearing loss (SNHL) of left ear with unrestricted hearing of right ear  Scoliosis  Chronic pelvic pain in female  Intentional diphenhydramine overdose (H)  Pica in  adults  Polyneuropathy  Irregular menses  Limited mobility  Elevated BP without diagnosis of hypertension  Esophageal tear, sequela  Enlarged lymph nodes  Fibroids  Herpes labialis  Balance problem  Contusion of bone  Carpal tunnel syndrome  Esophageal perforation  Dysphagia  Migraine without aura  Foot drop, left  Type 2 diabetes mellitus   RAD (reactive airway disease) with wheezing, mild persistent, uncomplicated   Porcelain gallbladder   Cholelithiasis   Recurrent cold sores   CIDP (chronic inflammatory demyelinating polyneuropathy)   Tachycardia   Hypertension   Acute bronchiolitis due to human metapneumovirus   Fentanyl use disorder, moderate   Factitious disorder imposed on self   Impulse control disorder   Odynophagia   Acute superficial gastritis without hemorrhage   Pneumonia     Past Surgical History:    Esophagoscopy, gastroscopy, duodenoscopy (egd), combined x 62  Esophagoscopy, gastroscopy, duodenoscopy (egd), dilatation, combined   Removal, foreign body, rectum  Pr esophagogastroduodenoscopy transoral diagnostic x 14  Pr surg diagnostic exam, anorectal   Combined esophagoscopy, gastroscopy, duodenoscopy (egd), replace esophageal stent   Laparotomy exploratory x 2  Sigmoidoscopy flexible x 3  Exam under anesthesia rectum   Hc remove fecal impaction or fb w anesthesia   Abdomen surgery   Knee surgery (Right)  Laparoscopic ablation endometriosis  Lymph nodes removed from neck; benign [Other]  Mammoplasty reduction (Bilateral)  Release carpal tunnel (Bilateral)  Power port placement   Appendectomy   Cholecystectomy     Physical Exam   Patient Vitals for the past 24 hrs:   BP Temp Temp src Pulse Resp SpO2   12/31/23 1734 135/87 98.4  F (36.9  C) Temporal 79 18 100 %        Physical Exam  Gen: well appearing, in no acute distress  Oral : Mucous membranes moist,   Nose: No rhinorhea  Ears: External near normal, without drainage  Eyes: periorbital tissues and sclera normal   Neck: supple, no abnormal  swelling  Lungs: Clear bilaterally, no tachypnea or distress, speaks full sentences  CV: Regular rate, regular rhythm  Ext: no lower extremity edema  Skin: warm, dry, well perfused, no rashes/bruising/lesions on exposed skin  Neuro: alert, no gross motor or sensory deficits,   Psych: pleasant mood, normal affect     Emergency Department Course   ECG  ECG taken at 1736, ECG read at 1745  Normal sinus rhythm   Cannot rule out anterior infarct, age undetermined   Abnormal ECG    Rate 95 bpm. ID interval 150 ms. QRS duration 70 ms. QT/QTc 326/409 ms. P-R-T axes 39 79 19.     Imaging:  Chest XR,  PA & LAT   Final Result   IMPRESSION:       No focal airspace disease. No pleural effusion or pneumothorax.      The cardiomediastinal silhouette is unremarkable.      Levoconvex curvature of the thoracic spine.         Laboratory:  Labs Ordered and Resulted from Time of ED Arrival to Time of ED Departure   INFLUENZA A/B, RSV, & SARS-COV2 PCR - Abnormal       Result Value    Influenza A PCR Negative      Influenza B PCR Negative      RSV PCR Positive (*)     SARS CoV2 PCR Negative        Emergency Department Course & Assessments:           Interventions:  Medications - No data to display     Independent Interpretation (X-rays, CTs, rhythm strip):  Chest x-ray reviewed no obvious infiltrate    Assessments/Consultations/Discussion of Management or Tests:  ED Course as of 12/31/23 1833   Sun Dec 31, 2023   1824 I obtained history and examined the patient as noted above.        Social Determinants of Health affecting care:   None    Disposition:  The patient was discharged to home.     Impression & Plan    Medical Decision Making:  Patient presents with symptoms consistent with viral upper respiratory infection.  Appears nontoxic.  Tested positive for RSV tonight, exam within normal limits.  Discussed supportive cares with the patient, she informs she me due to living in a group home she will need prescriptions for any medicine  changes.  She is requesting medication for sinus congestion so I recommended some pseudoephedrine.  Also requesting a change from her Tylenol so I recommend 1 g 4 times a day as needed for aches and pains and fevers.      Diagnosis:    ICD-10-CM    1. RSV infection  B33.8            Discharge Medications:  New Prescriptions    No medications on file          Scribe Disclosure:  I, Joycelyn Hendricksonbianca, am serving as a scribe at 6:35 PM on 12/31/2023 to document services personally performed by Bird Ortiz,* based on my observations and the provider's statements to me.     12/31/2023   Bird Ortiz,*        Bird Ortiz MD  12/31/23 4407

## 2023-12-31 NOTE — ED TRIAGE NOTES
Patient arrived via EMS from her group home. Patient stated that she was exposed to someone with covid, now complaining of cough and chest pain with the cough, and concerns that she has a blood clot. Patient took two Covid tests at home which were negative.

## 2024-01-02 ENCOUNTER — HOSPITAL ENCOUNTER (EMERGENCY)
Facility: CLINIC | Age: 34
Discharge: NURSING, REHAB OR LONG-TERM CARE | End: 2024-01-02
Attending: EMERGENCY MEDICINE | Admitting: EMERGENCY MEDICINE
Payer: COMMERCIAL

## 2024-01-02 ENCOUNTER — APPOINTMENT (OUTPATIENT)
Dept: GENERAL RADIOLOGY | Facility: CLINIC | Age: 34
End: 2024-01-02
Attending: EMERGENCY MEDICINE
Payer: COMMERCIAL

## 2024-01-02 VITALS
OXYGEN SATURATION: 96 % | DIASTOLIC BLOOD PRESSURE: 116 MMHG | SYSTOLIC BLOOD PRESSURE: 132 MMHG | TEMPERATURE: 98.4 F | RESPIRATION RATE: 14 BRPM | HEART RATE: 120 BPM

## 2024-01-02 DIAGNOSIS — J06.9 VIRAL UPPER RESPIRATORY ILLNESS: ICD-10-CM

## 2024-01-02 DIAGNOSIS — J02.9 ACUTE PHARYNGITIS, UNSPECIFIED ETIOLOGY: ICD-10-CM

## 2024-01-02 DIAGNOSIS — E87.6 HYPOKALEMIA: ICD-10-CM

## 2024-01-02 DIAGNOSIS — R05.1 ACUTE COUGH: ICD-10-CM

## 2024-01-02 LAB
ANION GAP SERPL CALCULATED.3IONS-SCNC: 11 MMOL/L (ref 7–15)
ATRIAL RATE - MUSE: 107 BPM
ATRIAL RATE - MUSE: 116 BPM
BASOPHILS # BLD AUTO: 0 10E3/UL (ref 0–0.2)
BASOPHILS NFR BLD AUTO: 1 %
BUN SERPL-MCNC: 9.1 MG/DL (ref 6–20)
CALCIUM SERPL-MCNC: 9.2 MG/DL (ref 8.6–10)
CHLORIDE SERPL-SCNC: 105 MMOL/L (ref 98–107)
CREAT SERPL-MCNC: 0.71 MG/DL (ref 0.51–0.95)
DEPRECATED HCO3 PLAS-SCNC: 25 MMOL/L (ref 22–29)
DIASTOLIC BLOOD PRESSURE - MUSE: NORMAL MMHG
DIASTOLIC BLOOD PRESSURE - MUSE: NORMAL MMHG
EGFRCR SERPLBLD CKD-EPI 2021: >90 ML/MIN/1.73M2
EOSINOPHIL # BLD AUTO: 0.3 10E3/UL (ref 0–0.7)
EOSINOPHIL NFR BLD AUTO: 7 %
ERYTHROCYTE [DISTWIDTH] IN BLOOD BY AUTOMATED COUNT: 13.5 % (ref 10–15)
GLUCOSE SERPL-MCNC: 176 MG/DL (ref 70–99)
HCT VFR BLD AUTO: 40 % (ref 35–47)
HGB BLD-MCNC: 13.3 G/DL (ref 11.7–15.7)
IMM GRANULOCYTES # BLD: 0 10E3/UL
IMM GRANULOCYTES NFR BLD: 0 %
INTERPRETATION ECG - MUSE: NORMAL
INTERPRETATION ECG - MUSE: NORMAL
LYMPHOCYTES # BLD AUTO: 1 10E3/UL (ref 0.8–5.3)
LYMPHOCYTES NFR BLD AUTO: 25 %
MAGNESIUM SERPL-MCNC: 1.9 MG/DL (ref 1.7–2.3)
MCH RBC QN AUTO: 30.4 PG (ref 26.5–33)
MCHC RBC AUTO-ENTMCNC: 33.3 G/DL (ref 31.5–36.5)
MCV RBC AUTO: 92 FL (ref 78–100)
MONOCYTES # BLD AUTO: 0.4 10E3/UL (ref 0–1.3)
MONOCYTES NFR BLD AUTO: 9 %
NEUTROPHILS # BLD AUTO: 2.3 10E3/UL (ref 1.6–8.3)
NEUTROPHILS NFR BLD AUTO: 58 %
NRBC # BLD AUTO: 0 10E3/UL
NRBC BLD AUTO-RTO: 0 /100
NT-PROBNP SERPL-MCNC: 50 PG/ML (ref 0–450)
P AXIS - MUSE: 30 DEGREES
P AXIS - MUSE: 40 DEGREES
PLATELET # BLD AUTO: 218 10E3/UL (ref 150–450)
POTASSIUM SERPL-SCNC: 3.1 MMOL/L (ref 3.4–5.3)
PR INTERVAL - MUSE: 150 MS
PR INTERVAL - MUSE: 166 MS
QRS DURATION - MUSE: 78 MS
QRS DURATION - MUSE: 80 MS
QT - MUSE: 322 MS
QT - MUSE: 454 MS
QTC - MUSE: 429 MS
QTC - MUSE: 631 MS
R AXIS - MUSE: 74 DEGREES
R AXIS - MUSE: 74 DEGREES
RBC # BLD AUTO: 4.37 10E6/UL (ref 3.8–5.2)
SODIUM SERPL-SCNC: 141 MMOL/L (ref 135–145)
SYSTOLIC BLOOD PRESSURE - MUSE: NORMAL MMHG
SYSTOLIC BLOOD PRESSURE - MUSE: NORMAL MMHG
T AXIS - MUSE: -1 DEGREES
T AXIS - MUSE: 18 DEGREES
TROPONIN T SERPL HS-MCNC: 10 NG/L
VENTRICULAR RATE- MUSE: 107 BPM
VENTRICULAR RATE- MUSE: 116 BPM
WBC # BLD AUTO: 3.9 10E3/UL (ref 4–11)

## 2024-01-02 PROCEDURE — 84484 ASSAY OF TROPONIN QUANT: CPT | Performed by: EMERGENCY MEDICINE

## 2024-01-02 PROCEDURE — 96365 THER/PROPH/DIAG IV INF INIT: CPT

## 2024-01-02 PROCEDURE — 36415 COLL VENOUS BLD VENIPUNCTURE: CPT | Performed by: EMERGENCY MEDICINE

## 2024-01-02 PROCEDURE — 258N000003 HC RX IP 258 OP 636: Performed by: EMERGENCY MEDICINE

## 2024-01-02 PROCEDURE — 99285 EMERGENCY DEPT VISIT HI MDM: CPT | Mod: 25

## 2024-01-02 PROCEDURE — 250N000013 HC RX MED GY IP 250 OP 250 PS 637: Performed by: EMERGENCY MEDICINE

## 2024-01-02 PROCEDURE — 96361 HYDRATE IV INFUSION ADD-ON: CPT

## 2024-01-02 PROCEDURE — 71046 X-RAY EXAM CHEST 2 VIEWS: CPT

## 2024-01-02 PROCEDURE — 85025 COMPLETE CBC W/AUTO DIFF WBC: CPT | Performed by: EMERGENCY MEDICINE

## 2024-01-02 PROCEDURE — 80048 BASIC METABOLIC PNL TOTAL CA: CPT | Performed by: EMERGENCY MEDICINE

## 2024-01-02 PROCEDURE — 83735 ASSAY OF MAGNESIUM: CPT | Performed by: EMERGENCY MEDICINE

## 2024-01-02 PROCEDURE — 250N000011 HC RX IP 250 OP 636: Performed by: EMERGENCY MEDICINE

## 2024-01-02 PROCEDURE — 93005 ELECTROCARDIOGRAM TRACING: CPT

## 2024-01-02 PROCEDURE — 96375 TX/PRO/DX INJ NEW DRUG ADDON: CPT

## 2024-01-02 PROCEDURE — 83880 ASSAY OF NATRIURETIC PEPTIDE: CPT | Performed by: EMERGENCY MEDICINE

## 2024-01-02 RX ORDER — ACETAMINOPHEN 500 MG
1000 TABLET ORAL ONCE
Status: COMPLETED | OUTPATIENT
Start: 2024-01-02 | End: 2024-01-02

## 2024-01-02 RX ORDER — DEXAMETHASONE SODIUM PHOSPHATE 10 MG/ML
10 INJECTION, SOLUTION INTRAMUSCULAR; INTRAVENOUS ONCE
Status: COMPLETED | OUTPATIENT
Start: 2024-01-02 | End: 2024-01-02

## 2024-01-02 RX ORDER — BENZONATATE 100 MG/1
100 CAPSULE ORAL 3 TIMES DAILY PRN
Status: DISCONTINUED | OUTPATIENT
Start: 2024-01-02 | End: 2024-01-02 | Stop reason: HOSPADM

## 2024-01-02 RX ORDER — BENZONATATE 100 MG/1
100 CAPSULE ORAL 3 TIMES DAILY PRN
Qty: 12 CAPSULE | Refills: 0 | Status: SHIPPED | OUTPATIENT
Start: 2024-01-02

## 2024-01-02 RX ORDER — GUAIFENESIN 200 MG/10ML
200 LIQUID ORAL ONCE
Status: DISCONTINUED | OUTPATIENT
Start: 2024-01-02 | End: 2024-01-02 | Stop reason: HOSPADM

## 2024-01-02 RX ORDER — POTASSIUM CHLORIDE 1500 MG/1
20 TABLET, EXTENDED RELEASE ORAL ONCE
Status: COMPLETED | OUTPATIENT
Start: 2024-01-02 | End: 2024-01-02

## 2024-01-02 RX ORDER — MAGNESIUM SULFATE HEPTAHYDRATE 40 MG/ML
2 INJECTION, SOLUTION INTRAVENOUS ONCE
Status: COMPLETED | OUTPATIENT
Start: 2024-01-02 | End: 2024-01-02

## 2024-01-02 RX ADMIN — MAGNESIUM SULFATE HEPTAHYDRATE 2 G: 40 INJECTION, SOLUTION INTRAVENOUS at 04:28

## 2024-01-02 RX ADMIN — ACETAMINOPHEN 1000 MG: 500 TABLET, FILM COATED ORAL at 04:10

## 2024-01-02 RX ADMIN — DEXAMETHASONE SODIUM PHOSPHATE 10 MG: 10 INJECTION INTRAMUSCULAR; INTRAVENOUS at 05:51

## 2024-01-02 RX ADMIN — SODIUM CHLORIDE 1000 ML: 9 INJECTION, SOLUTION INTRAVENOUS at 04:27

## 2024-01-02 RX ADMIN — BENZONATATE 100 MG: 100 CAPSULE ORAL at 04:49

## 2024-01-02 RX ADMIN — POTASSIUM CHLORIDE 20 MEQ: 1500 TABLET, EXTENDED RELEASE ORAL at 05:51

## 2024-01-02 ASSESSMENT — ACTIVITIES OF DAILY LIVING (ADL)
ADLS_ACUITY_SCORE: 40
ADLS_ACUITY_SCORE: 40

## 2024-01-02 NOTE — DISCHARGE INSTRUCTIONS
You can use honey for cough and sore throat.  You can use Tessalon Perles for cough.  You can use over-the-counter cough medicine.  You can take Tylenol every 6 hours for body aches and fever.    Discharge Instructions  Upper Respiratory Infection    The upper respiratory tract includes the sinuses, nasal passages, pharynx, and larynx. A URI, or upper respiratory infection, is an infection of any of the parts of the upper airway. Symptoms include runny nose, congestion, sneezing, sore throat, cough, and fever. URIs are almost always caused by a virus. Antibiotics do not help with viral infections, so are generally not prescribed. A URI is very contagious through coughing and nasal secretions; make sure you wash your hands often and clean surfaces after sneezing, coughing or touching them. While you should start to improve in 3 - 5 days, remember that sometimes a cough can linger for several weeks.    Generally, every Emergency Department visit should have a follow-up clinic visit with either a primary or a specialty clinic/provider. Please follow-up as instructed by your emergency provider today.    Return to the Emergency Department if:  Any of your symptoms get much worse.  You seem very sick, like being too weak to get up.  You have chest pain or shortness of breath.   You have a severe headache.  You are vomiting (throwing up) so much you cannot keep fluids or medicines down.  You have confusion or seem unusually drowsy.  You have a seizure.    What can I do to help myself?  Fill any prescriptions the provider gave you and take them right away  If you have a fever, get plenty of rest and drink lots of fluids, especially water.  Using a humidifier or saline nose spray will also help loosen mucous.   Clothes or blankets will not change your fever. Do what is comfortable for you.  Bathing or sponging in lukewarm water may help you feel better.  Acetaminophen (Tylenol ) or ibuprofen (Advil , Motrin ) will help bring  fever down and may help you feel more comfortable. Be sure to read and follow the package directions, and ask your provider if you have questions.  Do not drink alcohol.  Decongestants may help you feel better. You may use decongestant nose sprays Afrin  (oxymetazoline) or Arun-Synephrine  (phenylephrine hydrochloride) for up to 3 days, or may use a decongestant tablet like Sudafed  (pseudoephedrine).  If you were given a prescription for medicine here today, be sure to read all of the information (including the package insert) that comes with your prescription.  This will include important information about the medicine, its side effects, and any warnings that you need to know about.  The pharmacist who fills the prescription can provide more information and answer questions you may have about the medicine.  If you have questions or concerns that the pharmacist cannot address, please call or return to the Emergency Department.   Remember that you can always come back to the Emergency Department if you are not able to see your regular provider in the amount of time listed above, if you get any new symptoms, or if there is anything that worries you.

## 2024-01-02 NOTE — ED NOTES
Bed: ED23  Expected date:   Expected time:   Means of arrival:   Comments:  [facility] 33F RSV Duoneb + albuterol   Body Location Override (Optional - Billing Will Still Be Based On Selected Body Map Location If Applicable): left mid abdomen Detail Level: Detailed Size Of Lesion In Cm (Optional): 0 Body Location Override (Optional - Billing Will Still Be Based On Selected Body Map Location If Applicable): right mid spine Body Location Override (Optional - Billing Will Still Be Based On Selected Body Map Location If Applicable): right low back Body Location Override (Optional - Billing Will Still Be Based On Selected Body Map Location If Applicable): left lateral lower abdomen

## 2024-01-02 NOTE — ED PROVIDER NOTES
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 58      % Lymphocytes 25      % Monocytes 9      % Eosinophils 7      % Basophils 1      % Immature Granulocytes 0      NRBCs per 100 WBC 0      Absolute Neutrophils 2.3      Absolute Lymphocytes 1.0      Absolute Monocytes 0.4      Absolute Eosinophils 0.3      Absolute Basophils 0.0       Absolute Immature Granulocytes 0.0      Absolute NRBCs 0.0     TROPONIN T, HIGH SENSITIVITY - Normal    Troponin T, High Sensitivity 10     NT PROBNP INPATIENT - Normal    N terminal Pro BNP Inpatient 50     MAGNESIUM - Normal    Magnesium 1.9          Procedures       Emergency Department Course & Assessments:             Interventions:  Medications   benzonatate (TESSALON) capsule 100 mg (100 mg Oral $Given 24 2221)   potassium chloride ER (KLOR-CON M) CR tablet 20 mEq (has no administration in time range)   dexAMETHasone PF (DECADRON) injection 10 mg (has no administration in time range)   acetaminophen (TYLENOL) tablet 1,000 mg (1,000 mg Oral $Given 24 5160)   sodium chloride 0.9% BOLUS 1,000 mL (1,000 mLs Intravenous $New Bag 24 0941)   magnesium sulfate 2 g in 50 mL sterile water intermittent infusion (0 g Intravenous Stopped 24 5183)        Assessments:  On reevaluation, the patient is still coughing.  Her heart rate has improved to 104.    Independent Interpretation (X-rays, CTs, rhythm strip):  Chest x-ray does not show any signs of consolidation.    Consultations/Discussion of Management or Tests:  None        Social Determinants of Health affecting care:   None    Disposition:  The patient was discharged to home.     Impression & Plan    CMS Diagnoses: None      Medical Decision Makin-year-old female with a history of borderline personality disorder, depression, anxiety, multiple other mental health problems presents to the emergency department with a complaint of shortness of breath.  Patient was just diagnosed with RSV 2 days ago.  She states that when she woke up she felt like she was wheezing.  EMS did give her 2 breathing treatments.  She states that did help her breathing but now she is very jittery and anxious.  On exam patient is anxious and tearful.  She states that she just got over COVID and now has RSV.  Her vital signs shows tachycardia, she did just get 2 breathing  treatments.  She is not hypoxic or tachypneic.  She does have head congestion.  No wheezing on exam.  EKG shows prolonged QTc at 631 ms.  Patient is given a liter of fluid as well as 2 g of magnesium.  She is also given Tessalon Perles for cough and Tylenol.  Troponin is at her baseline.  I have low suspicion for PE, the patient is not hypoxic or tachypneic.  She states that her shortness of breath is really from her sore throat and coughing.  She states that she feels short of breath when she is coughing really hard.  Chest x-ray does not show any signs of pneumonia.  I think that her shortness of breath and chest pain is from her RSV and coughing.  Patient's potassium is replaced.  Patient's QTc has improved after electrolyte replacement.  She is no longer having any chest pain.  Her heart rate has improved.  She is advised to use Tessalon Perles for cough as well as honey, and cough drops.  I advised her to eat regular meals and drink plenty of fluids.  Patient is discharged home.      Diagnosis:    ICD-10-CM    1. Acute cough  R05.1       2. Acute pharyngitis, unspecified etiology  J02.9       3. Viral upper respiratory illness  J06.9       4. Hypokalemia  E87.6            Discharge Medications:  New Prescriptions    BENZONATATE (TESSALON) 100 MG CAPSULE    Take 1 capsule (100 mg) by mouth 3 times daily as needed for cough          Tea Kirkland MD  1/2/2024   Tea Kirkland MD Richardson, Elizabeth, MD  01/04/24 7991

## 2024-01-05 ENCOUNTER — VIRTUAL VISIT (OUTPATIENT)
Dept: ENDOCRINOLOGY | Facility: CLINIC | Age: 34
End: 2024-01-05
Payer: COMMERCIAL

## 2024-01-05 VITALS — BODY MASS INDEX: 47.84 KG/M2 | HEIGHT: 62 IN | WEIGHT: 260 LBS

## 2024-01-05 DIAGNOSIS — E66.813 CLASS 3 SEVERE OBESITY WITH SERIOUS COMORBIDITY AND BODY MASS INDEX (BMI) OF 50.0 TO 59.9 IN ADULT, UNSPECIFIED OBESITY TYPE (H): Primary | ICD-10-CM

## 2024-01-05 DIAGNOSIS — E66.01 CLASS 3 SEVERE OBESITY WITH SERIOUS COMORBIDITY AND BODY MASS INDEX (BMI) OF 50.0 TO 59.9 IN ADULT, UNSPECIFIED OBESITY TYPE (H): Primary | ICD-10-CM

## 2024-01-05 PROCEDURE — 99213 OFFICE O/P EST LOW 20 MIN: CPT | Mod: 95 | Performed by: NURSE PRACTITIONER

## 2024-01-05 ASSESSMENT — PAIN SCALES - GENERAL: PAINLEVEL: NO PAIN (0)

## 2024-01-05 NOTE — LETTER
2024       RE: Nevin Alvarado  [address] [facility] [address]     Dear Colleague,    Thank you for referring your patient, Nevin Alvarado, to the [facility] [place] [facility]. Please see a copy of my visit note below.      Return Medical Weight Management Note     Nevin Alvarado  MRN:  [id number]  :  1990  MOR:  2024    Dear Latonya Knight MD,    I had the pleasure of seeing your patient Nevin Alvarado. She is a 33 year old female who I am continuing to see for treatment of obesity related to:        2023    12:48 PM   --   I have the following health issues associated with obesity Type II Diabetes    High Blood Pressure    Sleep Apnea    Polycystic Ovarian Syndrome    GERD (Reflux)    Fatty Liver    Asthma    Stress Incontinence       Assessment & Plan  Problem List Items Addressed This Visit          Digestive    Class 3 severe obesity with serious comorbidity and body mass index (BMI) of 50.0 to 59.9 in adult, unspecified obesity type (H) - Primary     Has has URI symptoms for the last 2 months, tested positive for covid two separate times as well as RSV. Appetite limited while ill. Tolerating 1mg wegovy well otherwise. Encourager her to stay at current dose for now. Could consider dose increase if appetite increases as she feels better. However, availability issues currently with 1.7mg dose.     Continue wegovy 1mg  Push fluids   Protein shake daily while sick   Follow up 3 months          Relevant Medications    Semaglutide-Weight Management (WEGOVY) 1 MG/0.5ML pen          INTERVAL HISTORY:  wegovy started      Has had URI symptoms for 2 months - covid x 2 and RSV.     Anti-obesity medication history    Current:   wegovy 1mg - nausea is resolved, was taking omeprazole without any benefit when switching from 0.25mg wegovy to 14mg rybelsus  again.   Did have some time in there where she was feeling well and on wegovy - appetite was okay - 2 meals a day     Some of the weight loss since last seen was in the last week - related to sore throat     Recent diet changes:   Less appetite and sore throat since having covid and RSV - more difficult to eat   2 meals a day usually   Maybe a snack- cheese stick, crackers, uncrustable, ice cream -- craving and hunger   Increased cravings for fast food - difficult to eat the food she has made     CURRENT WEIGHT:   260 lbs 0 oz    Initial Weight (lbs): 309 lbs  Last Visits Weight: 124.3 kg (274 lb)  Cumulative weight loss (lbs): 49  Weight Loss Percentage: 15.86%        1/5/2024     1:00 PM   Changes and Difficulties   I have made the following changes to my diet since my last visit: been sick, so no appetite   I have made the following changes to my activity/exercise since my last visit: none         MEDICATIONS:   Current Outpatient Medications   Medication Sig Dispense Refill    Semaglutide-Weight Management (WEGOVY) 1 MG/0.5ML pen Inject 1 mg Subcutaneous once a week 2 mL 2    acetaminophen (TYLENOL) 500 MG tablet Take 2 tablets (1,000 mg) by mouth every 6 hours as needed for pain or fever 60 tablet 0    albuterol (PROAIR HFA/PROVENTIL HFA/VENTOLIN HFA) 108 (90 Base) MCG/ACT inhaler Inhale 2 puffs into the lungs every 6 hours as needed for shortness of breath / dyspnea or wheezing 18 g 0    albuterol (PROVENTIL) (2.5 MG/3ML) 0.083% neb solution Take 1 vial (2.5 mg) by nebulization every 6 hours as needed for shortness of breath or wheezing 90 mL 0    [place]ovis Saline Nasal No-Drip GEL Spray 1 Application in nostril daily Apply into each nare 2 times daily Place in front of each side of your nose and breathe in to distribute gel daily. - Each Nare 22 mL 11    BANOPHEN 2-0.1 % external cream Apply 1 applicator topically 2 times daily as needed for itching (Patient not taking: Reported on 12/1/2023)      benzonatate  (TESSALON) 100 MG capsule Take 1 capsule (100 mg) by mouth 3 times daily as needed for cough 12 capsule 0    brexpiprazole (REXULTI) 1 MG tablet Take 1 mg by mouth at bedtime      cetirizine (ZYRTEC) 10 MG tablet Take 1 tablet (10 mg) by mouth daily 30 tablet 0    Cholecalciferol (D3 HIGH POTENCY) 25 MCG (1000 UT) CAPS Take 50 mcg by mouth daily      clonazePAM (KLONOPIN) 0.5 MG tablet Take 0.5mg daily PRN anxiety- 20 tablets per month, try to keep it to 3-4x per week      cyclobenzaprine (FLEXERIL) 10 MG tablet Take 1 tablet (10 mg) by mouth 3 times daily as needed for muscle spasms 20 tablet 0    ferrous sulfate (FEROSUL) 325 (65 Fe) MG tablet Take 1 tablet (325 mg) by mouth daily (with breakfast) 30 tablet 0    fluocinonide (LIDEX) 0.05 % external cream Apply 1 Application topically 2 times daily as needed Apply thinly to knee 2 times daily, Monday through Fridays, off on weekends as needed. Avoid face and skin folds. (Patient not taking: Reported on 12/1/2023)      furosemide (LASIX) 20 MG tablet Take 20 mg by mouth daily      hydroxychloroquine (PLAQUENIL) 200 MG tablet Take 200 mg by mouth daily      metFORMIN (GLUCOPHAGE XR) 500 MG 24 hr tablet Take 1,000 mg by mouth daily (with breakfast)      montelukast (SINGULAIR) 10 MG tablet Take 10 mg by mouth every evening      nabumetone (RELAFEN) 750 MG tablet Take 750 mg by mouth 2 times daily      norethindrone (AYGESTIN) 5 MG tablet Take 5 mg by mouth daily      omeprazole (PRILOSEC) 40 MG DR capsule Take 1 capsule (40 mg) by mouth 2 times daily (before meals) 180 capsule 3    ondansetron (ZOFRAN-ODT) 4 MG ODT tab Take 1 tablet (4 mg) by mouth every 8 hours as needed for nausea 15 tablet 0    pregabalin (LYRICA) 100 MG capsule Take 100 mg by mouth every morning      pregabalin (LYRICA) 100 MG capsule Take 200 mg by mouth at bedtime      pseudoePHEDrine (SUDAFED) 60 MG tablet Take 1 tablet (60 mg) by mouth every 4 hours as needed for congestion 20 tablet 0     Respiratory Therapy Supplies (NEBULIZER) BRENDAN Nebulizer device.  Albuterol nebulization every 2 hours as needed for shortness of breath or wheezing. 1 each 0    sodium chloride (OCEAN) 0.65 % nasal spray Spray 2 sprays in each side of the nose every 3 hours while awake. 44 mL 11    SUMAtriptan (IMITREX) 25 MG tablet Take 25 mg by mouth at onset of headache for migraine May repeat in 2 hours.      valACYclovir (VALTREX) 1000 mg tablet Take 2,000 mg by mouth 2 times daily as needed Take 2 tablets by mouth two times daily for one day. Use as needed at onset of cold sore.             1/5/2024     1:00 PM   Weight Loss Medication History Reviewed With Patient   Which weight loss medications are you currently taking on a regular basis? Wegovy   Are you having any side effects from the weight loss medication that we have prescribed you? No       Admission on 01/02/2024, Discharged on 01/02/2024   Component Date Value Ref Range Status    Ventricular Rate 01/02/2024 116  BPM Final    Atrial Rate 01/02/2024 116  BPM Final    VA Interval 01/02/2024 150  ms Final    QRS Duration 01/02/2024 78  ms Final    QT 01/02/2024 454  ms Final    QTc 01/02/2024 631  ms Final    P Axis 01/02/2024 30  degrees Final    R AXIS 01/02/2024 74  degrees Final    T Axis 01/02/2024 18  degrees Final    Interpretation ECG 01/02/2024    Final                    Value:** Critical Test Result: Long QTc  Sinus tachycardia  Possible Anterior infarct (cited on or before 23-SEP-2023)  Prolonged QT  Abnormal ECG  When compared with ECG of 31-DEC-2023 17:36,  No significant change was found  Confirmed by GENERATED REPORT, COMPUTER ([id number]),  DU BARNARD ([id number]) on 1/2/2024 7:27:57 AM      Sodium 01/02/2024 141  135 - 145 mmol/L Final    Reference intervals for this test were updated on 09/26/2023 to more accurately reflect our healthy population. There may be differences in the flagging of prior results with similar values performed with this method.  Interpretation of those prior results can be made in the context of the updated reference intervals.     Potassium 01/02/2024 3.1 (L)  3.4 - 5.3 mmol/L Final    Chloride 01/02/2024 105  98 - 107 mmol/L Final    Carbon Dioxide (CO2) 01/02/2024 25  22 - 29 mmol/L Final    Anion Gap 01/02/2024 11  7 - 15 mmol/L Final    Urea Nitrogen 01/02/2024 9.1  6.0 - 20.0 mg/dL Final    Creatinine 01/02/2024 0.71  0.51 - 0.95 mg/dL Final    GFR Estimate 01/02/2024 >90  >60 mL/min/1.73m2 Final    Calcium 01/02/2024 9.2  8.6 - 10.0 mg/dL Final    Glucose 01/02/2024 176 (H)  70 - 99 mg/dL Final    Troponin T, High Sensitivity 01/02/2024 10  <=14 ng/L Final    Either a High Sensitivity Troponin T baseline (0 hours) value = 100 ng/L, or an increase in High Sensitivity Troponin T = 7 ng/L at 2 hours compared to 0 hours (2-0 hours), suggests myocardial injury, and urgent clinical attention is required.    If the 2-0 hours increase is <7 ng/L, a High Sensitivity Troponin T result above gender-specific reference ranges warrants further evaluation.   Recommendations for further evaluation include correlation with clinical decision-making tool (e.g., HEART), a 3rd High Sensitivity Troponin T test 2 hours after the 2nd (a 20% change from baseline would represent concern), admission for observation, close PCC/cardiology follow-up, or urgent outpatient provocative testing.    N terminal Pro BNP Inpatient 01/02/2024 50  0 - 450 pg/mL Final    Reference range shown and results flagged as abnormal are suggested inpatient cut points for confirming diagnosis if CHF in an acute setting. Establishing a baseline value for each individual patient is useful for follow-up. An inpatient or emergency department NT-proPBNP <300 pg/mL effectively rules out acute CHF, with 99% negative predictive value.    The outpatient non-acute reference range for ruling out CHF is:  0-125 pg/mL (age 18 to less than 75)  0-450 pg/mL (age 75 yrs and older)     WBC Count  01/02/2024 3.9 (L)  4.0 - 11.0 10e3/uL Final    RBC Count 01/02/2024 4.37  3.80 - 5.20 10e6/uL Final    Hemoglobin 01/02/2024 13.3  11.7 - 15.7 g/dL Final    Hematocrit 01/02/2024 40.0  35.0 - 47.0 % Final    MCV 01/02/2024 92  78 - 100 fL Final    MCH 01/02/2024 30.4  26.5 - 33.0 pg Final    MCHC 01/02/2024 33.3  31.5 - 36.5 g/dL Final    RDW 01/02/2024 13.5  10.0 - 15.0 % Final    Platelet Count 01/02/2024 218  150 - 450 10e3/uL Final    % Neutrophils 01/02/2024 58  % Final    % Lymphocytes 01/02/2024 25  % Final    % Monocytes 01/02/2024 9  % Final    % Eosinophils 01/02/2024 7  % Final    % Basophils 01/02/2024 1  % Final    % Immature Granulocytes 01/02/2024 0  % Final    NRBCs per 100 WBC 01/02/2024 0  <1 /100 Final    Absolute Neutrophils 01/02/2024 2.3  1.6 - 8.3 10e3/uL Final    Absolute Lymphocytes 01/02/2024 1.0  0.8 - 5.3 10e3/uL Final    Absolute Monocytes 01/02/2024 0.4  0.0 - 1.3 10e3/uL Final    Absolute Eosinophils 01/02/2024 0.3  0.0 - 0.7 10e3/uL Final    Absolute Basophils 01/02/2024 0.0  0.0 - 0.2 10e3/uL Final    Absolute Immature Granulocytes 01/02/2024 0.0  <=0.4 10e3/uL Final    Absolute NRBCs 01/02/2024 0.0  10e3/uL Final    Magnesium 01/02/2024 1.9  1.7 - 2.3 mg/dL Final    Ventricular Rate 01/02/2024 107  BPM Final    Atrial Rate 01/02/2024 107  BPM Final    CT Interval 01/02/2024 166  ms Final    QRS Duration 01/02/2024 80  ms Final    QT 01/02/2024 322  ms Final    QTc 01/02/2024 429  ms Final    P Axis 01/02/2024 40  degrees Final    R AXIS 01/02/2024 74  degrees Final    T Axis 01/02/2024 -1  degrees Final    Interpretation ECG 01/02/2024    Final                    Value:Sinus tachycardia  Anterior infarct (cited on or before 23-SEP-2023)  T wave abnormality, consider inferior ischemia  Abnormal ECG  When compared with ECG of 02-JAN-2024 03:46, (unconfirmed)  No significant change was found  Confirmed by GENERATED REPORT, COMPUTER ([id number]),  DU BARNARD ([id number]) on 1/2/2024  "7:27:53 AM               9/13/2023    10:26 AM   BRIAN Score (Last Two)   BRIAN Raw Score 37   Activation Score 79.2   BRIAN Level 4         PHYSICAL EXAM:  Objective   Ht 1.575 m (5' 2.01\")   Wt 117.9 kg (260 lb)   LMP 10/09/2023 (Exact Date)   BMI 47.54 kg/m               GENERAL: Healthy, alert and no distress  EYES: Eyes grossly normal to inspection.  No discharge or erythema, or obvious scleral/conjunctival abnormalities.  RESP: No audible wheeze, cough, or visible cyanosis.  No visible retractions or increased work of breathing.    SKIN: Visible skin clear. No significant rash, abnormal pigmentation or lesions.  NEURO: Cranial nerves grossly intact.  Mentation and speech appropriate for age.  PSYCH: Mentation appears normal, affect normal/bright, judgement and insight intact, normal speech and appearance well-groomed.        Sincerely,    Sharon Toro NP      20 minutes spent by me on the date of the encounter doing chart review, history and exam, documentation and further activities per the note    Virtual Visit Details    Type of service:  Video Visit   Video Start Time:  1310  Video End Time: 1328    Originating Location (pt. Location): Home    Distant Location (provider location):  Off-site  Platform used for Video Visit: Thuy    "

## 2024-01-05 NOTE — NURSING NOTE
Is the patient currently in the state of [place]? YES    Visit mode:VIDEO    If the visit is dropped, the patient can be reconnected by: VIDEO VISIT: Text to cell phone:   Telephone Information:   Mobile [phone]       Will anyone else be joining the visit? NO  (If patient encounters technical issues they should call [phone]    How would you like to obtain your AVS? MyChart    Are changes needed to the allergy or medication list? No    Reason for visit: RECHECK    Rosa ANTONIO

## 2024-01-05 NOTE — ASSESSMENT & PLAN NOTE
Has has URI symptoms for the last 2 months, tested positive for covid two separate times as well as RSV. Appetite limited while ill. Tolerating 1mg wegovy well otherwise. Encourager her to stay at current dose for now. Could consider dose increase if appetite increases as she feels better. However, availability issues currently with 1.7mg dose.     Continue wegovy 1mg  Push fluids   Protein shake daily while sick   Follow up 3 months

## 2024-01-05 NOTE — PROGRESS NOTES
Return Medical Weight Management Note     Nevin Alvarado  MRN:  [id number]  :  1990  MOR:  2024    Dear Latonya Knight MD,    I had the pleasure of seeing your patient Nevin Alvarado. She is a 33 year old female who I am continuing to see for treatment of obesity related to:        2023    12:48 PM   --   I have the following health issues associated with obesity Type II Diabetes    High Blood Pressure    Sleep Apnea    Polycystic Ovarian Syndrome    GERD (Reflux)    Fatty Liver    Asthma    Stress Incontinence       Assessment & Plan   Problem List Items Addressed This Visit          Digestive    Class 3 severe obesity with serious comorbidity and body mass index (BMI) of 50.0 to 59.9 in adult, unspecified obesity type (H) - Primary     Has has URI symptoms for the last 2 months, tested positive for covid two separate times as well as RSV. Appetite limited while ill. Tolerating 1mg wegovy well otherwise. Encourager her to stay at current dose for now. Could consider dose increase if appetite increases as she feels better. However, availability issues currently with 1.7mg dose.     Continue wegovy 1mg  Push fluids   Protein shake daily while sick   Follow up 3 months          Relevant Medications    Semaglutide-Weight Management (WEGOVY) 1 MG/0.5ML pen          INTERVAL HISTORY:  wegovy started      Has had URI symptoms for 2 months - covid x 2 and RSV.     Anti-obesity medication history    Current:   wegovy 1mg - nausea is resolved, was taking omeprazole without any benefit when switching from 0.25mg wegovy to 14mg rybelsus again.   Did have some time in there where she was feeling well and on wegovy - appetite was okay - 2 meals a day     Some of the weight loss since last seen was in the last week - related to sore throat     Recent diet changes:   Less appetite and sore throat since having covid and RSV - more difficult to eat   2 meals a day usually    Maybe a snack- cheese stick, crackers, uncrustable, ice cream -- craving and hunger   Increased cravings for fast food - difficult to eat the food she has made     CURRENT WEIGHT:   260 lbs 0 oz    Initial Weight (lbs): 309 lbs  Last Visits Weight: 124.3 kg (274 lb)  Cumulative weight loss (lbs): 49  Weight Loss Percentage: 15.86%        1/5/2024     1:00 PM   Changes and Difficulties   I have made the following changes to my diet since my last visit: been sick, so no appetite   I have made the following changes to my activity/exercise since my last visit: none         MEDICATIONS:   Current Outpatient Medications   Medication Sig Dispense Refill    Semaglutide-Weight Management (WEGOVY) 1 MG/0.5ML pen Inject 1 mg Subcutaneous once a week 2 mL 2    acetaminophen (TYLENOL) 500 MG tablet Take 2 tablets (1,000 mg) by mouth every 6 hours as needed for pain or fever 60 tablet 0    albuterol (PROAIR HFA/PROVENTIL HFA/VENTOLIN HFA) 108 (90 Base) MCG/ACT inhaler Inhale 2 puffs into the lungs every 6 hours as needed for shortness of breath / dyspnea or wheezing 18 g 0    albuterol (PROVENTIL) (2.5 MG/3ML) 0.083% neb solution Take 1 vial (2.5 mg) by nebulization every 6 hours as needed for shortness of breath or wheezing 90 mL 0    [place] Saline Nasal No-Drip GEL Spray 1 Application in nostril daily Apply into each nare 2 times daily Place in front of each side of your nose and breathe in to distribute gel daily. - Each Nare 22 mL 11    BANOPHEN 2-0.1 % external cream Apply 1 applicator topically 2 times daily as needed for itching (Patient not taking: Reported on 12/1/2023)      benzonatate (TESSALON) 100 MG capsule Take 1 capsule (100 mg) by mouth 3 times daily as needed for cough 12 capsule 0    brexpiprazole (REXULTI) 1 MG tablet Take 1 mg by mouth at bedtime      cetirizine (ZYRTEC) 10 MG tablet Take 1 tablet (10 mg) by mouth daily 30 tablet 0    Cholecalciferol (D3 HIGH POTENCY) 25 MCG (1000 UT) CAPS Take 50 mcg by mouth  daily      clonazePAM (KLONOPIN) 0.5 MG tablet Take 0.5mg daily PRN anxiety- 20 tablets per month, try to keep it to 3-4x per week      cyclobenzaprine (FLEXERIL) 10 MG tablet Take 1 tablet (10 mg) by mouth 3 times daily as needed for muscle spasms 20 tablet 0    ferrous sulfate (FEROSUL) 325 (65 Fe) MG tablet Take 1 tablet (325 mg) by mouth daily (with breakfast) 30 tablet 0    fluocinonide (LIDEX) 0.05 % external cream Apply 1 Application topically 2 times daily as needed Apply thinly to knee 2 times daily, Monday through Fridays, off on weekends as needed. Avoid face and skin folds. (Patient not taking: Reported on 12/1/2023)      furosemide (LASIX) 20 MG tablet Take 20 mg by mouth daily      hydroxychloroquine (PLAQUENIL) 200 MG tablet Take 200 mg by mouth daily      metFORMIN (GLUCOPHAGE XR) 500 MG 24 hr tablet Take 1,000 mg by mouth daily (with breakfast)      montelukast (SINGULAIR) 10 MG tablet Take 10 mg by mouth every evening      nabumetone (RELAFEN) 750 MG tablet Take 750 mg by mouth 2 times daily      norethindrone (AYGESTIN) 5 MG tablet Take 5 mg by mouth daily      omeprazole (PRILOSEC) 40 MG DR capsule Take 1 capsule (40 mg) by mouth 2 times daily (before meals) 180 capsule 3    ondansetron (ZOFRAN-ODT) 4 MG ODT tab Take 1 tablet (4 mg) by mouth every 8 hours as needed for nausea 15 tablet 0    pregabalin (LYRICA) 100 MG capsule Take 100 mg by mouth every morning      pregabalin (LYRICA) 100 MG capsule Take 200 mg by mouth at bedtime      pseudoePHEDrine (SUDAFED) 60 MG tablet Take 1 tablet (60 mg) by mouth every 4 hours as needed for congestion 20 tablet 0    Respiratory Therapy Supplies (NEBULIZER) BRENDAN Nebulizer device.  Albuterol nebulization every 2 hours as needed for shortness of breath or wheezing. 1 each 0    sodium chloride (OCEAN) 0.65 % nasal spray Spray 2 sprays in each side of the nose every 3 hours while awake. 44 mL 11    SUMAtriptan (IMITREX) 25 MG tablet Take 25 mg by mouth at onset  of headache for migraine May repeat in 2 hours.      valACYclovir (VALTREX) 1000 mg tablet Take 2,000 mg by mouth 2 times daily as needed Take 2 tablets by mouth two times daily for one day. Use as needed at onset of cold sore.             1/5/2024     1:00 PM   Weight Loss Medication History Reviewed With Patient   Which weight loss medications are you currently taking on a regular basis? Wegovy   Are you having any side effects from the weight loss medication that we have prescribed you? No       Admission on 01/02/2024, Discharged on 01/02/2024   Component Date Value Ref Range Status    Ventricular Rate 01/02/2024 116  BPM Final    Atrial Rate 01/02/2024 116  BPM Final    MS Interval 01/02/2024 150  ms Final    QRS Duration 01/02/2024 78  ms Final    QT 01/02/2024 454  ms Final    QTc 01/02/2024 631  ms Final    P Axis 01/02/2024 30  degrees Final    R AXIS 01/02/2024 74  degrees Final    T Axis 01/02/2024 18  degrees Final    Interpretation ECG 01/02/2024    Final                    Value:** Critical Test Result: Long QTc  Sinus tachycardia  Possible Anterior infarct (cited on or before 23-SEP-2023)  Prolonged QT  Abnormal ECG  When compared with ECG of 31-DEC-2023 17:36,  No significant change was found  Confirmed by GENERATED REPORT, COMPUTER ([id number]),  DU BARNARD ([id number]) on 1/2/2024 7:27:57 AM      Sodium 01/02/2024 141  135 - 145 mmol/L Final    Reference intervals for this test were updated on 09/26/2023 to more accurately reflect our healthy population. There may be differences in the flagging of prior results with similar values performed with this method. Interpretation of those prior results can be made in the context of the updated reference intervals.     Potassium 01/02/2024 3.1 (L)  3.4 - 5.3 mmol/L Final    Chloride 01/02/2024 105  98 - 107 mmol/L Final    Carbon Dioxide (CO2) 01/02/2024 25  22 - 29 mmol/L Final    Anion Gap 01/02/2024 11  7 - 15 mmol/L Final    Urea Nitrogen 01/02/2024 9.1   6.0 - 20.0 mg/dL Final    Creatinine 01/02/2024 0.71  0.51 - 0.95 mg/dL Final    GFR Estimate 01/02/2024 >90  >60 mL/min/1.73m2 Final    Calcium 01/02/2024 9.2  8.6 - 10.0 mg/dL Final    Glucose 01/02/2024 176 (H)  70 - 99 mg/dL Final    Troponin T, High Sensitivity 01/02/2024 10  <=14 ng/L Final    Either a High Sensitivity Troponin T baseline (0 hours) value = 100 ng/L, or an increase in High Sensitivity Troponin T = 7 ng/L at 2 hours compared to 0 hours (2-0 hours), suggests myocardial injury, and urgent clinical attention is required.    If the 2-0 hours increase is <7 ng/L, a High Sensitivity Troponin T result above gender-specific reference ranges warrants further evaluation.   Recommendations for further evaluation include correlation with clinical decision-making tool (e.g., HEART), a 3rd High Sensitivity Troponin T test 2 hours after the 2nd (a 20% change from baseline would represent concern), admission for observation, close PCC/cardiology follow-up, or urgent outpatient provocative testing.    N terminal Pro BNP Inpatient 01/02/2024 50  0 - 450 pg/mL Final    Reference range shown and results flagged as abnormal are suggested inpatient cut points for confirming diagnosis if CHF in an acute setting. Establishing a baseline value for each individual patient is useful for follow-up. An inpatient or emergency department NT-proPBNP <300 pg/mL effectively rules out acute CHF, with 99% negative predictive value.    The outpatient non-acute reference range for ruling out CHF is:  0-125 pg/mL (age 18 to less than 75)  0-450 pg/mL (age 75 yrs and older)     WBC Count 01/02/2024 3.9 (L)  4.0 - 11.0 10e3/uL Final    RBC Count 01/02/2024 4.37  3.80 - 5.20 10e6/uL Final    Hemoglobin 01/02/2024 13.3  11.7 - 15.7 g/dL Final    Hematocrit 01/02/2024 40.0  35.0 - 47.0 % Final    MCV 01/02/2024 92  78 - 100 fL Final    MCH 01/02/2024 30.4  26.5 - 33.0 pg Final    MCHC 01/02/2024 33.3  31.5 - 36.5 g/dL Final    RDW  "01/02/2024 13.5  10.0 - 15.0 % Final    Platelet Count 01/02/2024 218  150 - 450 10e3/uL Final    % Neutrophils 01/02/2024 58  % Final    % Lymphocytes 01/02/2024 25  % Final    % Monocytes 01/02/2024 9  % Final    % Eosinophils 01/02/2024 7  % Final    % Basophils 01/02/2024 1  % Final    % Immature Granulocytes 01/02/2024 0  % Final    NRBCs per 100 WBC 01/02/2024 0  <1 /100 Final    Absolute Neutrophils 01/02/2024 2.3  1.6 - 8.3 10e3/uL Final    Absolute Lymphocytes 01/02/2024 1.0  0.8 - 5.3 10e3/uL Final    Absolute Monocytes 01/02/2024 0.4  0.0 - 1.3 10e3/uL Final    Absolute Eosinophils 01/02/2024 0.3  0.0 - 0.7 10e3/uL Final    Absolute Basophils 01/02/2024 0.0  0.0 - 0.2 10e3/uL Final    Absolute Immature Granulocytes 01/02/2024 0.0  <=0.4 10e3/uL Final    Absolute NRBCs 01/02/2024 0.0  10e3/uL Final    Magnesium 01/02/2024 1.9  1.7 - 2.3 mg/dL Final    Ventricular Rate 01/02/2024 107  BPM Final    Atrial Rate 01/02/2024 107  BPM Final    FL Interval 01/02/2024 166  ms Final    QRS Duration 01/02/2024 80  ms Final    QT 01/02/2024 322  ms Final    QTc 01/02/2024 429  ms Final    P Axis 01/02/2024 40  degrees Final    R AXIS 01/02/2024 74  degrees Final    T Axis 01/02/2024 -1  degrees Final    Interpretation ECG 01/02/2024    Final                    Value:Sinus tachycardia  Anterior infarct (cited on or before 23-SEP-2023)  T wave abnormality, consider inferior ischemia  Abnormal ECG  When compared with ECG of 02-JAN-2024 03:46, (unconfirmed)  No significant change was found  Confirmed by GENERATED REPORT, COMPUTER ([id number]),  DU BARNARD ([id number]) on 1/2/2024 7:27:53 AM               9/13/2023    10:26 AM   BRIAN Score (Last Two)   BRIAN Raw Score 37   Activation Score 79.2   BRIAN Level 4         PHYSICAL EXAM:  Objective    Ht 1.575 m (5' 2.01\")   Wt 117.9 kg (260 lb)   LMP 10/09/2023 (Exact Date)   BMI 47.54 kg/m               GENERAL: Healthy, alert and no distress  EYES: Eyes grossly normal to " inspection.  No discharge or erythema, or obvious scleral/conjunctival abnormalities.  RESP: No audible wheeze, cough, or visible cyanosis.  No visible retractions or increased work of breathing.    SKIN: Visible skin clear. No significant rash, abnormal pigmentation or lesions.  NEURO: Cranial nerves grossly intact.  Mentation and speech appropriate for age.  PSYCH: Mentation appears normal, affect normal/bright, judgement and insight intact, normal speech and appearance well-groomed.        Sincerely,    Sharon Toro NP      20 minutes spent by me on the date of the encounter doing chart review, history and exam, documentation and further activities per the note    Virtual Visit Details    Type of service:  Video Visit   Video Start Time:  1310  Video End Time: 1328    Originating Location (pt. Location): Home    Distant Location (provider location):  Off-site  Platform used for Video Visit: Thuy

## 2024-01-08 ENCOUNTER — TELEPHONE (OUTPATIENT)
Dept: GASTROENTEROLOGY | Facility: CLINIC | Age: 34
End: 2024-01-08
Payer: COMMERCIAL

## 2024-01-08 NOTE — TELEPHONE ENCOUNTER
[facility] Call Center    Phone Message    May a detailed message be left on voicemail: yes     Reason for Call: Other: Pt is requesting a call back from the team please to discuss being seen sooner, Pt states she is needing a visit before her procedure. Please advise. Thank you!     Action Taken: Message routed to:  [facility] ([facility]): GI    Travel Screening: Not Applicable

## 2024-01-09 ENCOUNTER — TELEPHONE (OUTPATIENT)
Dept: GASTROENTEROLOGY | Facility: CLINIC | Age: 34
End: 2024-01-09
Payer: COMMERCIAL

## 2024-01-09 NOTE — TELEPHONE ENCOUNTER
Pre visit planning completed.      Procedure details:    Patient scheduled for Upper endoscopy (EGD) on 1/23/2024.     Arrival time: 1130. Procedure time 1300    Pre op exam needed? Yes. PAC eval is needed, not yet scheduled    Facility location: [facility]; [address], [place] [address]    Sedation type: MAC    Indication for procedure:   Gastroesophageal reflux disease, unspecified whether esophagitis present [K21.9]      Esophageal dysphagia [R13.19]      Swallowed foreign body, sequela [T18.9XXS]            Chart review:     Electronic implanted devices? No    Recent diagnosis of diverticulitis within the last 6 weeks? N/A    Diabetic? No    Diabetic medication HOLDING recommendations: (if applicable)  Oral diabetic medications: Yes:  Metformin (glucophage): HOLD day of procedure.  Diabetic injectables: Yes- Wegovy (Semaglutide). Weekly dosing of medication.  Hold 7 days before procedure.  Follow up with managing provider.   Insulin: N/A      Medication review:    Anticoagulants? No    NSAIDS? Yes.  Nabumetone (Relafen).  Holding interval of 10 days    Other medication HOLDING recommendations:  Iron supplements: HOLD 7 days before procedure.      Prep for procedure:     Prep instructions sent via SnapMyAd       Agatha Grimes RN  Endoscopy Procedure Pre Assessment RN  [phone] option 4

## 2024-01-10 ENCOUNTER — TELEPHONE (OUTPATIENT)
Dept: GASTROENTEROLOGY | Facility: CLINIC | Age: 34
End: 2024-01-10

## 2024-01-10 ENCOUNTER — VIRTUAL VISIT (OUTPATIENT)
Dept: GASTROENTEROLOGY | Facility: CLINIC | Age: 34
End: 2024-01-10
Attending: PHYSICIAN ASSISTANT
Payer: COMMERCIAL

## 2024-01-10 DIAGNOSIS — R13.19 ESOPHAGEAL DYSPHAGIA: ICD-10-CM

## 2024-01-10 DIAGNOSIS — T18.9XXS SWALLOWED FOREIGN BODY, SEQUELA: ICD-10-CM

## 2024-01-10 DIAGNOSIS — K21.9 GASTROESOPHAGEAL REFLUX DISEASE, UNSPECIFIED WHETHER ESOPHAGITIS PRESENT: ICD-10-CM

## 2024-01-10 PROCEDURE — 99214 OFFICE O/P EST MOD 30 MIN: CPT | Mod: 95 | Performed by: PHYSICIAN ASSISTANT

## 2024-01-10 RX ORDER — OMEPRAZOLE 40 MG/1
40 CAPSULE, DELAYED RELEASE ORAL DAILY
Qty: 90 CAPSULE | Refills: 0 | Status: SHIPPED | OUTPATIENT
Start: 2024-01-10 | End: 2024-01-19

## 2024-01-10 NOTE — TELEPHONE ENCOUNTER
Pre assessment completed for upcoming procedure.   (Please see previous telephone encounter notes for complete details)    Patient  returned call.       Procedure details:    Arrival time and facility location reviewed.    Pre op exam needed? Yes. Pt scheduled one for 1/16    Designated  policy reviewed. Instructed to have someone stay 24 hours post procedure.     COVID policy reviewed.      Medication review:    Ferrous Sulfate (iron supplement): HOLD 7 days before procedure.  NSAID medication(s): Nabumetone (Relafen): HOLD 10 days before procedure.   Pt states Metformin has been discontinued and that she is unable to hold Wegovy. Writer will send message to provider regarding whether okay to hold Wegovy.    Prep for procedure:     Procedure prep instructions reviewed.        Additional information needed?  N/A      Patient  verbalized understanding and had no questions or concerns at this time.      Agatha Grimes RN  Endoscopy Procedure Pre Assessment RN  [phone] option 4

## 2024-01-10 NOTE — TELEPHONE ENCOUNTER
FUTURE VISIT INFORMATION      SURGERY INFORMATION:  Date: 24  [facility]  Surgeon:  Felipe Ulloa DO   Anesthesia Type:  MAC  Procedure: Esophagoscopy, gastroscopy, duodenoscopy (EGD), dilatation, combined     RECORDS REQUESTED FROM:       Primary Care Provider: Latonya Knight    Most recent EKG+ Tracin24

## 2024-01-10 NOTE — PATIENT INSTRUCTIONS
It was a pleasure taking care of you today.  I've included a brief summary of our discussion and care plan from today's visit below.  Please review this information with your primary care provider.  _______________________________________________________________________    My recommendations are summarized as follows:      - Please continue to follow-up up with primary care provider and behavioral health team.  It is strongly recommended to continue regular therapy sessions without de-escalation and frequency  - Cancel preoperative visit and upper endoscopy scheduled for later this month  - De-escalate omeprazole to Omeprazole 40mg once daily 30-60 minutes before breakfast  - Reflux friendly lifestyle modifications as directed in the AVS  - Consider initiation of daily fiber supplementation to help regulate bowel patternst.     Fiber Recommendations:  -- Recommend starting supplementation with a powdered soluble fiber. When used on a daily basis, this can help regulate the consistency of your stools. Metamucil (psyllium) and Citrucel are preferred examples. You can start with 1-2 teaspoons per day, with goal to gradually increase to 1 tablespoon daily. You can increase up to 1 tablespoon three times daily if needed. It is important to stay well-hydrated with use of fiber supplementation and to make sure that the fiber powder is well mixed with water as directed on the label. You may experience some bloating with initiation of fiber, which will improve over the first few weeks. A good trial to evaluate the effect is 3-6 months. Of note, many of the fiber products contain artificial sweeteners, which can cause bloating, gas, and diarrhea in those who may be sensitive to artificial sweeteners. If this is the case, recommend trying Metamucil Premium Blend (with Stevia), Metamucil 4-in-1 without Added Sweeteners, or Bellway (sweetened with Monk fruit extract).      Gastroesophageal Reflux Disease (GERD) Lifestyle  Modifications:   If taking acid suppression therapy (PPI ie Pantoprazole, Lansoprazole, Omeprazole, Esomeprazole, Rabeprazole, Dexlansoprazole) it should be taken 30 - 60 minutes prior to meals on an empty stomach to have maximum effect  Avoid triggers for reflux such as coffee, chocolate, carbonated beverages, spicy foods, acidic foods (tomato based/citrus and foods with high fat content   Abstinence from alcohol and cessation of all tobacco products is recommended   Studies have shown that weight loss, exercise and maintaining a healthy BMI significantly reduce GERD symptoms   Remain upright while eating and immediately after meals  Do not eat or drink at least 3 hours prior to laying down supine/laying down for bed   Avoid late night/middle of the night snacking    Consider obtaining a wedge pillow or elevating the head end of the bed while sleeping   Avoid sleeping right side down as this can place the lower part of the esophagus/lower esophageal sphincter in a dependent position that favors reflux   Attempting to eat smaller more frequent meals may improve symptoms       To schedule endoscopic procedures you may call: [phone]  To schedule radiology (imaging) tests you may call: [phone]  To schedule an ENT appointment you may call: [phone]    Please call my nurse Arianna ([phone]), Roberta ([phone]) with any questions or concerns.      Return to GI Clinic in 4 months to review your progress.    _______________________________________________________________________    Who do I call with any questions after my visit?  Please be in touch if there are any further questions that arise following today's visit.  There are multiple ways to contact your gastroenterology care team.      During business hours, you may reach a Gastroenterology nurse at [phone] and choose option 3.       To schedule or reschedule an appointment, please call [phone].     You can always send a secure message  through [employer].  P[employer] messages are answered by your nurse or doctor typically within 24 hours.  Please allow extra time on weekends and holidays.      For urgent/emergent questions after business hours, you may reach the on-call GI Fellow by contacting the [facility]  at [phone].     How will I get the results of any tests ordered?    You will receive all of your results.  If you have signed up for [employer], any tests ordered at your visit will be available to you after your physician reviews them.  Typically this takes 1-2 weeks.  If there are urgent results that require a change in your care plan, your physician or nurse will call you to discuss the next steps.      What is [employer]?  Phen[employer]ix is a secure way for you to access all of your healthcare records from the [facility].  It is a web based computer program, so you can sign on to it from any location.  It also allows you to send secure messages to your care team.  I recommend signing up for Phenomix access if you have not already done so and are comfortable with using a computer.      How to I schedule a follow-up visit?  If you did not schedule a follow-up visit today, please call [phone] to schedule a follow-up office visit.      If you feel you received exceptional care and are interested in supporting the clinical and research goals of Carli Potter PA-C or the Division of Gastroenterology, Hepatology, and Nutrition please contact [email or web address] from the [facility] to discuss opportunities to donate.    Sincerely,    Carli Potter PA-C  Division of Gastroenterology, Hepatology, and Nutrition  [facility]

## 2024-01-10 NOTE — TELEPHONE ENCOUNTER
Attempted to contact patient in order to complete pre assessment questions.     No answer. Left message to return call to [phone] option 4    Writer also left message for legal guardian Aaliyah Shepard    Writer attempting to get phone number of group home to give instructions. Pt's mother does not know group home number and states to call pt.    Agatha Grimes RN  Endoscopy Procedure Pre Assessment RN

## 2024-01-10 NOTE — LETTER
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 N/A 11/28/2020    Procedure: ESOPHAGOGASTRODUODENOSCOPY (EGD);  Surgeon: Campbell Rogers MD;  [facility]    ESOPHAGOSCOPY, GASTROSCOPY, DUODENOSCOPY (EGD), COMBINED N/A 3/12/2021    Procedure: ESOPHAGOGASTRODUODENOSCOPY, WITH FOREIGN BODY REMOVAL using cold snare;  Surgeon: Marianna Rudolph MD;  [facility]    ESOPHAGOSCOPY, GASTROSCOPY, DUODENOSCOPY (EGD), COMBINED N/A 12/10/2017    Procedure: ESOPHAGOGASTRODUODENOSCOPY (EGD) with foreign body removal;  Surgeon: Lila Sol MD;  Location: [facility];  Service:     ESOPHAGOSCOPY, GASTROSCOPY, DUODENOSCOPY (EGD), COMBINED N/A 2/13/2018     Procedure: ESOPHAGOGASTRODUODENOSCOPY (EGD);  Surgeon: Barney Pinto MD;  Location: [facility];  Service:     ESOPHAGOSCOPY, GASTROSCOPY, DUODENOSCOPY (EGD), COMBINED N/A 11/9/2018    Procedure: UPPER ENDOSCOPY, FOREIGN BODY REMOVAL;  Surgeon: Cristino Kelsey MD;  Location: [facility];  Service: Gastroenterology    ESOPHAGOSCOPY, GASTROSCOPY, DUODENOSCOPY (EGD), COMBINED N/A 11/17/2018    Procedure: ESOPHAGOGASTRODUODENOSCOPY (EGD) with foreign body removal;  Surgeon: Gustavo Mathew MD;  Location: [facility];  Service: Gastroenterology    ESOPHAGOSCOPY, GASTROSCOPY, DUODENOSCOPY (EGD), COMBINED N/A 11/22/2018    Procedure: ESOPHAGOGASTRODUODENOSCOPY (EGD);  Surgeon: Binu Vigil MD;  Location: [facility];  Service: Gastroenterology    ESOPHAGOSCOPY, GASTROSCOPY, DUODENOSCOPY (EGD), COMBINED N/A 11/25/2018    Procedure: UPPER ENDOSCOPY TO REMOVE PAPER CLIPS;  Surgeon: Hira Jacobs MD;  Location: [facility];  Service: Gastroenterology    ESOPHAGOSCOPY, GASTROSCOPY, DUODENOSCOPY (EGD), COMBINED N/A 8/1/2021    Procedure: ESOPHAGOGASTRODUODENOSCOPY (EGD);  Surgeon: Binu Vigil MD;  Location: [facility]    ESOPHAGOSCOPY, GASTROSCOPY, DUODENOSCOPY (EGD), COMBINED N/A 7/31/2021    Procedure: ESOPHAGOGASTRODUODENOSCOPY (EGD);  Surgeon: Keith Quinn MD;  Location: [facility]    ESOPHAGOSCOPY, GASTROSCOPY, DUODENOSCOPY (EGD), COMBINED N/A 8/13/2021    Procedure: ESOPHAGOGASTRODUODENOSCOPY (EGD);  Surgeon: Gustavo Mathew MD;  Location: [facility]    ESOPHAGOSCOPY, GASTROSCOPY, DUODENOSCOPY (EGD), COMBINED N/A 8/13/2021    Procedure: ESOPHAGOGASTRODUODENOSCOPY (EGD) with foreign body removal;  Surgeon: Gustavo Mathew MD;  Location: [facility]    ESOPHAGOSCOPY, GASTROSCOPY, DUODENOSCOPY (EGD), COMBINED N/A 1/30/2022    Procedure: ESOPHAGOGASTRODUODENOSCOPY (EGD) FOREIGN BODY REMOVAL;  Surgeon: Bird Sethi MD;  Location: [facility]     ESOPHAGOSCOPY, GASTROSCOPY, DUODENOSCOPY (EGD), COMBINED N/A 2/3/2022    Procedure: ESOPHAGOGASTRODUODENOSCOPY (EGD), FOREIGN BODY REMOVAL;  Surgeon: Binu Vigil MD;  Location: [facility] Main OR    ESOPHAGOSCOPY, GASTROSCOPY, DUODENOSCOPY (EGD), COMBINED N/A 2/7/2022    Procedure: ESOPHAGOGASTRODUODENOSCOPY (EGD) WITH FOREIGN BODY REMOVAL;  Surgeon: Darek Mendoza MD;  Location: [facility] OR    ESOPHAGOSCOPY, GASTROSCOPY, DUODENOSCOPY (EGD), COMBINED N/A 2/8/2022    Procedure: ESOPHAGOGASTRODUODENOSCOPY (EGD), foreign body removal;  Surgeon: Lyndsey Mendoza DO;  [facility]    ESOPHAGOSCOPY, GASTROSCOPY, DUODENOSCOPY (EGD), COMBINED N/A 2/15/2022    Procedure: UPPER ESOPHAGOGASTRODUODENOSCOPY, WITH FOREIGN BODY REMOVAL AND USE OF BLANKENSHIP;  Surgeon: Samia Stanton MD;  [facility]    ESOPHAGOSCOPY, GASTROSCOPY, DUODENOSCOPY (EGD), COMBINED N/A 7/9/2022    Procedure: ESOPHAGOGASTRODUODENOSCOPY (EGD) with foreign body extraction;  Surgeon: Felipe Ulloa DO;  [facility]    ESOPHAGOSCOPY, GASTROSCOPY, DUODENOSCOPY (EGD), COMBINED N/A 7/29/2022    Procedure: ESOPHAGOGASTRODUODENOSCOPY (EGD) WITH FOREIGN BODY REMOVAL;  Surgeon: Pamela Perez MD;  [facility]    ESOPHAGOSCOPY, GASTROSCOPY, DUODENOSCOPY (EGD), COMBINED N/A 8/6/2022    Procedure: ESOPHAGOGASTRODUODENOSCOPY, WITH FOREIGN BODY REMOVAL;  Surgeon: Bety Nova MD;  Location:  GI    ESOPHAGOSCOPY, GASTROSCOPY, DUODENOSCOPY (EGD), COMBINED N/A 8/13/2022    Procedure: ESOPHAGOGASTRODUODENOSCOPY, WITH FOREIGN BODY REMOVAL using raptor device;  Surgeon: Brice Ramirez MD;  Location: [facility]    ESOPHAGOSCOPY, GASTROSCOPY, DUODENOSCOPY (EGD), COMBINED N/A 8/24/2022    Procedure: ESOPHAGOGASTRODUODENOSCOPY (EGD);  Surgeon: Jeffy Bradley MD;  Location:  GI    ESOPHAGOSCOPY, GASTROSCOPY, DUODENOSCOPY (EGD), COMBINED N/A 9/17/2022    Procedure: ESOPHAGOGASTRODUODENOSCOPY (EGD), Foreign Body  removal;  Surgeon: Pamela Perez MD;  Location:  OR    ESOPHAGOSCOPY, GASTROSCOPY, DUODENOSCOPY (EGD), COMBINED N/A 9/25/2022    Procedure: ESOPHAGOGASTRODUODENOSCOPY, WITH FOREIGN BODY REMOVAL;  Surgeon: Kash Griffin MD;  Location:  GI    ESOPHAGOSCOPY, GASTROSCOPY, DUODENOSCOPY (EGD), COMBINED N/A 10/23/2022    Procedure: ESOPHAGOGASTRODUODENOSCOPY (EGD) FOR REMOVAL OF FOREIGN BODY;  Surgeon: Barney Pinto MD;  Location: [facility] Main OR    ESOPHAGOSCOPY, GASTROSCOPY, DUODENOSCOPY (EGD), COMBINED N/A 11/3/2022    Procedure: ESOPHAGOGASTRODUODENOSCOPY (EGD) for foreign body removal;  Surgeon: Cruz Kumar MD;  Location: [facility] OR    ESOPHAGOSCOPY, GASTROSCOPY, DUODENOSCOPY (EGD), COMBINED N/A 11/29/2022    Procedure: ESOPHAGOGASTRODUODENOSCOPY (EGD);  Surgeon: Cristino Kelsey MD, MD;  Location: [facility] OR    ESOPHAGOSCOPY, GASTROSCOPY, DUODENOSCOPY (EGD), COMBINED N/A 12/8/2022    Procedure: ESOPHAGOGASTRODUODENOSCOPY (EGD) with foreign body removal;  Surgeon: Efrem Begum MD;  Location: [facility]    ESOPHAGOSCOPY, GASTROSCOPY, DUODENOSCOPY (EGD), COMBINED N/A 12/28/2022    Procedure: ESOPHAGOGASTRODUODENOSCOPY, WITH FOREIGN BODY REMOVAL;  Surgeon: Doug Hansen MD;  Location:  GI    ESOPHAGOSCOPY, GASTROSCOPY, DUODENOSCOPY (EGD), COMBINED N/A 1/20/2023    Procedure: ESOPHAGOGASTRODUODENOSCOPY (EGD);  Surgeon: Bety Nova MD;  Location:  GI    ESOPHAGOSCOPY, GASTROSCOPY, DUODENOSCOPY (EGD), COMBINED N/A 3/11/2023    Procedure: ESOPHAGOGASTRODUODENOSCOPY WITH FOREIGN BODY REMOVAL;  Surgeon: Cruz Kumar MD;  [facility]    ESOPHAGOSCOPY, GASTROSCOPY, DUODENOSCOPY (EGD), COMBINED N/A 10/16/2023    Procedure: ESOPHAGOGASTRODUODENOSCOPY (EGD) WITH FOREIGN BODY REMOVAL;  Surgeon: Cruz Kumar MD;  Location: [facility] OR    ESOPHAGOSCOPY, GASTROSCOPY, DUODENOSCOPY (EGD), COMBINED N/A  10/29/2023    Procedure: ESOPHAGOGASTRODUODENOSCOPY, WITH FOREIGN BODY REMOVAL;  Surgeon: Kash Griffin MD;  Location:  GI    ESOPHAGOSCOPY, GASTROSCOPY, DUODENOSCOPY (EGD), DILATATION, COMBINED N/A 8/30/2021    Procedure: ESOPHAGOGASTRODUODENOSCOPY, WITH DILATION (mngi);  Surgeon: Pat Cervantes MD;  [facility]    EXAM UNDER ANESTHESIA ANUS N/A 1/10/2017    Procedure: EXAM UNDER ANESTHESIA ANUS;  Surgeon: Annmarie Haynes MD;  [facility]    EXAM UNDER ANESTHESIA RECTUM N/A 7/19/2018    Procedure: EXAM UNDER ANESTHESIA RECTUM;  EXAM UNDER ANESTHESIA, REMOVAL OF RECTAL FOREIGN BODY;  Surgeon: Annmarie Haynes MD;  [facility]    HC REMOVE FECAL IMPACTION OR FB W ANESTHESIA N/A 12/18/2016    Procedure: REMOVE FECAL IMPACTION/FOREIGN BODY UNDER ANESTHESIA;  Surgeon: Soham Cano MD;  [facility]    KNEE SURGERY Right     KNEE SURGERY - removed a small tissue mass from knee Right     LAPAROSCOPIC ABLATION ENDOMETRIOSIS      LAPAROTOMY EXPLORATORY N/A 1/10/2017    Procedure: LAPAROTOMY EXPLORATORY;  Surgeon: Annmarie Haynes MD;  [facility]    LAPAROTOMY EXPLORATORY  09/11/2019    Manual manipulation of bowel to remove pill bottle in rectum    lymph nodes removed from neck; benign  age 7    MAMMOPLASTY REDUCTION Bilateral     OTHER SURGICAL HISTORY      foreign body anus removal    TN ESOPHAGOGASTRODUODENOSCOPY TRANSORAL DIAGNOSTIC N/A 1/5/2019    Procedure: ESOPHAGOGASTRODUODENOSCOPY (EGD) with foreign body removal using raptor;  Surgeon: Lila Sol MD;  Location: [facility];  Service: Gastroenterology    TN ESOPHAGOGASTRODUODENOSCOPY TRANSORAL DIAGNOSTIC N/A 1/25/2019    Procedure: ESOPHAGOGASTRODUODENOSCOPY (EGD) removal of foreign body;  Surgeon: Binu Vigil MD;  Location: [facility] OR;  Service: Gastroenterology    TN ESOPHAGOGASTRODUODENOSCOPY TRANSORAL DIAGNOSTIC N/A 1/31/2019    Procedure:  ESOPHAGOGASTRODUODENOSCOPY (EGD);  Surgeon: Siddharth Spears MD;  Location: [facility] OR;  Service: Gastroenterology    VT ESOPHAGOGASTRODUODENOSCOPY TRANSORAL DIAGNOSTIC N/A 8/17/2019    Procedure: ESOPHAGOGASTRODUODENOSCOPY (EGD) with foreign body removal;  Surgeon: Darek Lucero MD;  Location: [facility];  Service: Gastroenterology    VT ESOPHAGOGASTRODUODENOSCOPY TRANSORAL DIAGNOSTIC N/A 9/29/2019    Procedure: ESOPHAGOGASTRODUODENOSCOPY (EGD) with foreign body removal;  Surgeon: Bailey Arnold MD;  Location: [facility];  Service: Gastroenterology    VT ESOPHAGOGASTRODUODENOSCOPY TRANSORAL DIAGNOSTIC N/A 10/3/2019    Procedure: ESOPHAGOGASTRODUODENOSCOPY (EGD), REMOVAL OF FOREIGN BODY;  Surgeon: Chris Lira MD;  Location: [facility];  Service: Gastroenterology    VT ESOPHAGOGASTRODUODENOSCOPY TRANSORAL DIAGNOSTIC N/A 10/6/2019    Procedure: ESOPHAGOGASTRODUODENOSCOPY (EGD) with attempted foreign body removal;  Surgeon: Felipe Connolly MD;  Location: [facility];  Service: Gastroenterology    VT ESOPHAGOGASTRODUODENOSCOPY TRANSORAL DIAGNOSTIC N/A 12/15/2019    Procedure: ESOPHAGOGASTRODUODENOSCOPY (EGD) with foreign body removal;  Surgeon: Jeffy Zuñiga MD;  Location: [facility];  Service: Gastroenterology    VT ESOPHAGOGASTRODUODENOSCOPY TRANSORAL DIAGNOSTIC N/A 12/17/2019    Procedure: ESOPHAGOGASTRODUODENOSCOPY (EGD) with attempted foreign body removal;  Surgeon: Felipe Connolly MD;  Location: [facility];  Service: Gastroenterology    VT ESOPHAGOGASTRODUODENOSCOPY TRANSORAL DIAGNOSTIC N/A 12/21/2019    Procedure: ESOPHAGOGASTRODUODENOSCOPY (EGD) FOR FROEIGN BODY REMOVAL;  Surgeon: Binu Vigil MD;  Location: [facility];  Service: Gastroenterology    VT ESOPHAGOGASTRODUODENOSCOPY TRANSORAL DIAGNOSTIC N/A 7/22/2020    Procedure: ESOPHAGOGASTRODUODENOSCOPY (EGD);  Surgeon: Bailey Arnold MD;  Location: [facility]  Main OR;  Service: Gastroenterology    UT ESOPHAGOGASTRODUODENOSCOPY TRANSORAL DIAGNOSTIC N/A 8/14/2020    Procedure: ESOPHAGOGASTRODUODENOSCOPY (EGD) FOREIGN BODY REMOVAL;  Surgeon: Jeffy Zuñiga MD;  Location: [facility] OR;  Service: Gastroenterology    UT ESOPHAGOGASTRODUODENOSCOPY TRANSORAL DIAGNOSTIC N/A 2/25/2021    Procedure: ESOPHAGOGASTRODUODENOSCOPY (EGD) with foreign body reoval;  Surgeon: Bird Sethi MD;  Location: [facility];  Service: Gastroenterology    UT ESOPHAGOGASTRODUODENOSCOPY TRANSORAL DIAGNOSTIC N/A 4/19/2021    Procedure: ESOPHAGOGASTRODUODENOSCOPY (EGD);  Surgeon: Libia Rose MD;  Location: [facility] OR;  Service: Gastroenterology    UT SURG DIAGNOSTIC EXAM, ANORECTAL N/A 2/5/2020    Procedure: EXAM UNDER ANESTHESIA, Flexible Sigmoidoscopy, Retrieval of Foreign Body;  Surgeon: Sasha Ivan MD;  Location: [facility] OR;  Service: General    RELEASE CARPAL TUNNEL Bilateral     RELEASE CARPAL TUNNEL Bilateral     REMOVAL, FOREIGN BODY, RECTUM N/A 7/21/2021    Procedure: MANUAL RETREIVALOF FOREIGN OBJECT- RECTUM.;  Surgeon: Aleksandra Gerber MD;  Location: [facility] OR    SIGMOIDOSCOPY FLEXIBLE N/A 1/10/2017    Procedure: SIGMOIDOSCOPY FLEXIBLE;  Surgeon: Annmarie Haynes MD;  Location:  OR    SIGMOIDOSCOPY FLEXIBLE N/A 5/8/2018    Procedure: SIGMOIDOSCOPY FLEXIBLE;  flex sig with foreign body removal using snare and rattooth forcep;  Surgeon: Soham Cano MD;  Location: [facility]    SIGMOIDOSCOPY FLEXIBLE N/A 2/20/2019    Procedure: Exam under anesthesia Colonoscopy with attempted  removal of rectal foreign body;  Surgeon: Estrada Chávez MD;  Location:  OR       Social History     Socioeconomic History    Marital status: Single     Spouse name: Not on file    Number of children: Not on file    Years of education: Not on file    Highest education level: Not on file   Occupational History    Occupation: On disability   Tobacco Use    Smoking  status: Never    Smokeless tobacco: Never   Vaping Use    Vaping Use: Not on file   Substance and Sexual Activity    Alcohol use: No     Alcohol/week: 0.0 standard drinks of alcohol    Drug use: No    Sexual activity: Not Currently     Partners: Male     Birth control/protection: I.U.D.     Comment: IUD - Mirena - placed July, 2015   Other Topics Concern    Parent/sibling w/ CABG, MI or angioplasty before 65F 55M? Not Asked   Social History Narrative    Single.    Living in independent living portion of [employer].    On disability.    No regular exercise.      Social Determinants of Health     Financial Resource Strain: Not on file   Food Insecurity: Not on file   Transportation Needs: Not on file   Physical Activity: Not on file   Stress: Not on file   Social Connections: Not on file   Interpersonal Safety: Not on file   Housing Stability: Not on file       Family History   Problem Relation Age of Onset    Diabetes Type 2  Maternal Grandmother     Diabetes Type 2  Paternal Grandmother     Breast Cancer Paternal Grandmother     Cerebrovascular Disease Father 53    Myocardial Infarction No family hx of     Coronary Artery Disease Early Onset No family hx of     Ovarian Cancer No family hx of     Colon Cancer No family hx of     Depression Mother     Anxiety Disorder Mother      Family history reviewed and edited as appropriate    Medications and Allergies:     Outpatient Encounter Medications as of 1/10/2024   Medication Sig Dispense Refill    acetaminophen (TYLENOL) 500 MG tablet Take 2 tablets (1,000 mg) by mouth every 6 hours as needed for pain or fever 60 tablet 0    albuterol (PROAIR HFA/PROVENTIL HFA/VENTOLIN HFA) 108 (90 Base) MCG/ACT inhaler Inhale 2 puffs into the lungs every 6 hours as needed for shortness of breath / dyspnea or wheezing 18 g 0    albuterol (PROVENTIL) (2.5 MG/3ML) 0.083% neb solution Take 1 vial (2.5 mg) by nebulization every 6 hours as needed for shortness of breath or wheezing  90 mL 0    Ayr Saline Nasal No-Drip GEL Spray 1 Application in nostril daily Apply into each nare 2 times daily Place in front of each side of your nose and breathe in to distribute gel daily. - Each Nare 22 mL 11    BANOPHEN 2-0.1 % external cream Apply 1 applicator topically 2 times daily as needed for itching (Patient not taking: Reported on 12/1/2023)      benzonatate (TESSALON) 100 MG capsule Take 1 capsule (100 mg) by mouth 3 times daily as needed for cough 12 capsule 0    brexpiprazole (REXULTI) 1 MG tablet Take 1 mg by mouth at bedtime      cetirizine (ZYRTEC) 10 MG tablet Take 1 tablet (10 mg) by mouth daily 30 tablet 0    Cholecalciferol (D3 HIGH POTENCY) 25 MCG (1000 UT) CAPS Take 50 mcg by mouth daily      clonazePAM (KLONOPIN) 0.5 MG tablet Take 0.5mg daily PRN anxiety- 20 tablets per month, try to keep it to 3-4x per week      cyclobenzaprine (FLEXERIL) 10 MG tablet Take 1 tablet (10 mg) by mouth 3 times daily as needed for muscle spasms 20 tablet 0    ferrous sulfate (FEROSUL) 325 (65 Fe) MG tablet Take 1 tablet (325 mg) by mouth daily (with breakfast) 30 tablet 0    fluocinonide (LIDEX) 0.05 % external cream Apply 1 Application topically 2 times daily as needed Apply thinly to knee 2 times daily, Monday through Fridays, off on weekends as needed. Avoid face and skin folds. (Patient not taking: Reported on 12/1/2023)      furosemide (LASIX) 20 MG tablet Take 20 mg by mouth daily      hydroxychloroquine (PLAQUENIL) 200 MG tablet Take 200 mg by mouth daily      metFORMIN (GLUCOPHAGE XR) 500 MG 24 hr tablet Take 1,000 mg by mouth daily (with breakfast)      montelukast (SINGULAIR) 10 MG tablet Take 10 mg by mouth every evening      nabumetone (RELAFEN) 750 MG tablet Take 750 mg by mouth 2 times daily      norethindrone (AYGESTIN) 5 MG tablet Take 5 mg by mouth daily      omeprazole (PRILOSEC) 40 MG DR capsule Take 1 capsule (40 mg) by mouth 2 times daily (before meals) 180 capsule 3    ondansetron  (ZOFRAN-ODT) 4 MG ODT tab Take 1 tablet (4 mg) by mouth every 8 hours as needed for nausea 15 tablet 0    pregabalin (LYRICA) 100 MG capsule Take 100 mg by mouth every morning      pregabalin (LYRICA) 100 MG capsule Take 200 mg by mouth at bedtime      pseudoePHEDrine (SUDAFED) 60 MG tablet Take 1 tablet (60 mg) by mouth every 4 hours as needed for congestion 20 tablet 0    Respiratory Therapy Supplies (NEBULIZER) BRENDAN Nebulizer device.  Albuterol nebulization every 2 hours as needed for shortness of breath or wheezing. 1 each 0    Semaglutide-Weight Management (WEGOVY) 1 MG/0.5ML pen Inject 1 mg Subcutaneous once a week 2 mL 2    sodium chloride (OCEAN) 0.65 % nasal spray Spray 2 sprays in each side of the nose every 3 hours while awake. 44 mL 11    SUMAtriptan (IMITREX) 25 MG tablet Take 25 mg by mouth at onset of headache for migraine May repeat in 2 hours.      valACYclovir (VALTREX) 1000 mg tablet Take 2,000 mg by mouth 2 times daily as needed Take 2 tablets by mouth two times daily for one day. Use as needed at onset of cold sore.       No facility-administered encounter medications on file as of 1/10/2024.        Allergies   Allergen Reactions    Amoxicillin-Pot Clavulanate Other (See Comments), Swelling and Rash     PN: facial swelling, left side. Also had cortisone injection the same day as she started the Augmentin.  Noted in downtime recovery of chart.    PN: facial swelling, left side. Also had cortisone injection the same day as she started the Augmentin.; HUT Comment: PN: facial swelling, left side. Also had cortisone injection the same day as she started the Augmentin.Noted in downtime recovery of chart.; HUT Reaction: Rash; HUT Reaction: Unknown; HUT Reaction Type: Allergy; HUT Severity: Med; HUT Noted: [id number]  PN: facial swelling, left side. Also had cortisone injection the same day as she started the Augmentin.  Other reaction(s): *Unknown  PN: facial swelling, left side. Also had cortisone  injection the same day as she started the Augmentin.  Noted in downtime recovery of chart.  PN: facial swelling, left side. Also had cortisone injection the same day as she started the Augmentin.  Other reaction(s): Facial swelling  Other reaction(s): Facial swelling    Hydrocodone Nausea and Vomiting and GI Disturbance     vomiting for days, PN: vomiting for days; HUT Comment: vomiting for days; HUT Reaction: Gastrointestinal; HUT Reaction: Nausea And Vomiting; HUT Reaction Type: Intolerance; HUT Severity: Med; HUT Noted: [id number]  vomiting for days    Other reaction(s): Rash    Hydrocodone-Acetaminophen Nausea and Vomiting and Rash     Update on 12/12  Pt says she can take tylenol just not the hydrocodone.   Other reaction(s): Rash      Influenza Vaccines Other (See Comments) and Nausea and Vomiting     HUT Reaction: Nausea And Vomiting; HUT Reaction Type: Intolerance; HUT Severity: Low; HUT Noted: [id number]    Latex Rash     HUT Reaction: Rash; HUT Reaction Type: Allergy; HUT Severity: Low; HUT Noted: [id number]  Other reaction(s): Rash      Oseltamivir Hives     med stopped, PN: med stopped  med stopped, PN: med stopped; HUT Comment: med stopped, PN: med stopped; HUT Reaction: Hives; HUT Reaction Type: Allergy; HUT Severity: Med; HUT Noted: [id number]    Penicillins Anaphylaxis     HUT Reaction: Anaphylaxis; HUT Reaction Type: Allergy; HUT Severity: High; HUT Noted: [id number]    Vancomycin Itching, Swelling and Rash     Other reaction(s): Redness  Flushed face, very itchy; HUT Comment: Flushed face, very itchy; HUT Reaction: Angioedema; HUT Reaction: Redness; HUT Severity: Med; HUT Noted: [id number]    facial    Blood-Group Specific Substance Other (See Comments)     Patient has an anti-Cw and non-specific antibodies. Blood product orders may be delayed. Draw one red top and two purple top tubes for all type/screen/crossmatch orders.  Patient has anti-Cw, anti-K (Angella), Warm auto and nonspecific antibodies. Blood  products may be delayed. Draw patient 24 hours prior to transfusion. Draw one red top and two purple top tubes for all type and screen orders.    Clavulanic Acid Angioedema    Fentanyl Itching    Haemophilus B Polysaccharide Vaccine Nausea and Vomiting    Naltrexone Other (See Comments)     Reaction(s): Vivid dreams.    Other Drug Allergy (See Comments)      See original file MRN [id number]. Files are marked for merge    Oxycodone Swelling    Adhesive Tape Rash     Silicone type  Silicone type    Other reaction(s): adhesive allergy  Other reaction(s): adhesive allergy  Silicone type    Other reaction(s): adhesive allergy      Band-Aid Anti-Itch      Other reaction(s): adhesive allergy    Cephalosporins Rash    Lamotrigine Rash     Possibly associated with Lamictal.   HUT Comment: Possibly associated with Lamictal. ; HUT Reaction: Rash; HUT Reaction Type: Allergy; HUT Severity: Low; HUT Noted: [id number]    No Clinical Screening - See Comments Rash and Other (See Comments)     Silicone type  Silicone type  See original file MRN [id number]. Files are marked for merge  History of swallowing sharp metallic objects. She should not be prescribed lancets due to posed risk of swallowing.         Review of systems:  A full 10 point review of systems was obtained and was negative except for the pertinent positives and negatives stated within the HPI.    Objective Findings:   Physical Exam:    Constitutional: LMP 10/09/2023 (Exact Date)   General: Alert, oriented.   Head: Atraumatic, normocephalic, no obvious abnormalities   Eyes: Sclera anicteric, no obvious conjunctival hemorrhage   Nose: Nares normal, no obvious malformation, no obvious rhinorrhea   Respiratory: Normal appearing respirations, no cough, no apparent distress  Musculoskeletal: Range of motion intact, no obvious strength deficit  Skin: No jaundice, no obvious rash  Neurologic: AAOx3, no obvious neurologic abnormality.   Psychiatric: Normal Affect, appropriate  mood  Extremities: No obvious edema, no obvious malformation     Labs, Radiology, Pathology     Lab Results   Component Value Date    WBC 3.9 (L) 01/02/2024    WBC 5.9 12/13/2023    WBC 6.6 11/21/2023    HGB 13.3 01/02/2024    HGB 14.6 12/13/2023    HGB 13.6 11/21/2023     01/02/2024     12/13/2023     11/21/2023    CHOL 132 02/11/2022    CHOL 130 11/30/2020    CHOL 132 03/21/2018    TRIG 266 (H) 02/11/2022    TRIG 182 (H) 11/30/2020    TRIG 125 03/21/2018    HDL 41 (L) 02/11/2022    HDL 44 (L) 11/30/2020    HDL 48 (L) 03/21/2018    ALT 27 12/13/2023    ALT 13 11/21/2023    ALT 23 11/05/2023    AST 25 12/13/2023    AST 17 11/21/2023    AST 14 11/05/2023     01/02/2024     12/13/2023     11/21/2023    BUN 9.1 01/02/2024    BUN 12.6 12/13/2023    BUN 7.9 11/21/2023    CO2 25 01/02/2024    CO2 22 12/13/2023    CO2 21 (L) 11/21/2023    TSH 4.47 (H) 06/27/2023    TSH 4.94 (H) 02/11/2022    TSH 3.19 11/30/2020    INR 1.11 01/15/2023    INR 1.06 12/28/2022    INR 1.03 10/15/2022        Liver Function Studies -   Recent Labs   Lab Test 01/05/23  1905   PROTTOTAL 6.6   ALBUMIN 3.6   BILITOTAL 0.2   ALKPHOS 70   AST 24   ALT 30          Patient Active Problem List    Diagnosis Date Noted    Right leg paresthesias 05/24/2023     Priority: Medium    Right leg weakness 05/24/2023     Priority: Medium    Right low back pain, unspecified chronicity, unspecified whether sciatica present 05/24/2023     Priority: Medium    Foot drop, left 05/24/2023     Priority: Medium    Diarrhea, unspecified type 01/30/2023     Priority: Medium    Muscle strain of thigh, right, initial encounter 01/11/2023     Priority: Medium    Foreign body ingestion 09/16/2022     Priority: Medium    Foreign body ingestion, initial encounter 02/10/2022     Priority: Medium    Suicide gesture, subsequent encounter (H24) 11/27/2020     Priority: Medium    Gallstones 10/30/2020     Priority: Medium    RUQ abdominal pain  10/30/2020     Priority: Medium    Swallowed foreign body, initial encounter 01/12/2020     Priority: Medium    Swallowed foreign body, initial encounter 01/12/2020     Priority: Medium     Added automatically from request for surgery [id number]      Foreign body in digestive system 12/18/2019     Priority: Medium    Pulmonary embolism (H) 11/30/2019     Priority: Medium    Esophageal stricture 11/30/2019     Priority: Medium     Added automatically from request for surgery [id number]      Poor appetite 11/29/2019     Priority: Medium    High risk medication use 11/05/2019     Priority: Medium    History of posttraumatic stress disorder (PTSD) 11/05/2019     Priority: Medium    Dysphagia 11/03/2019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

## 2024-01-10 NOTE — TELEPHONE ENCOUNTER
Caller: No call  Reason for Reschedule/Cancellation (please be detailed, any staff messages or encounters to note?): Patient does not want procedure.       Prior to reschedule please review:  Ordering Provider: Carli Potter PA-C   Sedation per order: MAC  Does patient have any ASC Exclusions, please identify?: Y - BMI, ZOHRA      Notes on Cancelled Procedure:  Procedure: Upper Endoscopy [EGD]   Date: 1/23/2024  Location: [facility]; [address] [place] [address], [address] Floor, [place] [address]  Surgeon: Vibha      Rescheduled: No

## 2024-01-10 NOTE — NURSING NOTE
Is the patient currently in the state of [place]? YES    Visit mode:VIDEO    If the visit is dropped, the patient can be reconnected by: VIDEO VISIT: Send to e-mail at: [email or web address]    Will anyone else be joining the visit? NO  (If patient encounters technical issues they should call [phone]    How would you like to obtain your AVS? MyChart    Are changes needed to the allergy or medication list? No    Reason for visit: RECHECK    David ANTONIO

## 2024-01-10 NOTE — TELEPHONE ENCOUNTER
----- Message from Arianna Washburn RN sent at 1/10/2024 11:04 AM CST -----  Hi Braulio,    Per provider's note below - please cancel pt's EGD.    Thanks,  Arianna Boyce - should the follow up with you in May also be cancelled?      ----- Message -----  From: Carli Potter PA-C  Sent: 1/10/2024   9:58 AM CST  To: Roberta Lozano RN; Arianna Washburn RN    Can you please assist in canceling patient's EGD and perioperative visit which is scheduled for later this month.  She wishes to forego any intervention at this time.

## 2024-01-10 NOTE — PROGRESS NOTES
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 BODY REMOVAL;  Surgeon: Felipe Ulloa DO;  [facility]     ESOPHAGOSCOPY, GASTROSCOPY, DUODENOSCOPY (EGD), COMBINED N/A 11/28/2020    Procedure: ESOPHAGOGASTRODUODENOSCOPY (EGD);  Surgeon: Campbell Rogers MD;  [facility]     ESOPHAGOSCOPY, GASTROSCOPY, DUODENOSCOPY (EGD), COMBINED N/A 3/12/2021    Procedure: ESOPHAGOGASTRODUODENOSCOPY, WITH FOREIGN BODY REMOVAL using cold snare;  Surgeon: Marianna Rudolph MD;  Location:  GI     ESOPHAGOSCOPY, GASTROSCOPY, DUODENOSCOPY (EGD), COMBINED N/A 12/10/2017    Procedure: ESOPHAGOGASTRODUODENOSCOPY (EGD) with foreign body removal;  Surgeon: Lila Sol MD;  Location: [facility];  Service:      ESOPHAGOSCOPY, GASTROSCOPY,  DUODENOSCOPY (EGD), COMBINED N/A 2/13/2018    Procedure: ESOPHAGOGASTRODUODENOSCOPY (EGD);  Surgeon: Barney Pinto MD;  Location: [facility];  Service:      ESOPHAGOSCOPY, GASTROSCOPY, DUODENOSCOPY (EGD), COMBINED N/A 11/9/2018    Procedure: UPPER ENDOSCOPY, FOREIGN BODY REMOVAL;  Surgeon: Cristino Kelsey MD;  Location: [facility] OR;  Service: Gastroenterology     ESOPHAGOSCOPY, GASTROSCOPY, DUODENOSCOPY (EGD), COMBINED N/A 11/17/2018    Procedure: ESOPHAGOGASTRODUODENOSCOPY (EGD) with foreign body removal;  Surgeon: Gustavo Mathew MD;  Location: [facility];  Service: Gastroenterology     ESOPHAGOSCOPY, GASTROSCOPY, DUODENOSCOPY (EGD), COMBINED N/A 11/22/2018    Procedure: ESOPHAGOGASTRODUODENOSCOPY (EGD);  Surgeon: Binu Vigil MD;  Location: [facility];  Service: Gastroenterology     ESOPHAGOSCOPY, GASTROSCOPY, DUODENOSCOPY (EGD), COMBINED N/A 11/25/2018    Procedure: UPPER ENDOSCOPY TO REMOVE PAPER CLIPS;  Surgeon: Hira Jacobs MD;  Location: [facility];  Service: Gastroenterology     ESOPHAGOSCOPY, GASTROSCOPY, DUODENOSCOPY (EGD), COMBINED N/A 8/1/2021    Procedure: ESOPHAGOGASTRODUODENOSCOPY (EGD);  Surgeon: Binu Vigil MD;  Location: [facility]     ESOPHAGOSCOPY, GASTROSCOPY, DUODENOSCOPY (EGD), COMBINED N/A 7/31/2021    Procedure: ESOPHAGOGASTRODUODENOSCOPY (EGD);  Surgeon: Keith Quinn MD;  Location: [facility]     ESOPHAGOSCOPY, GASTROSCOPY, DUODENOSCOPY (EGD), COMBINED N/A 8/13/2021    Procedure: ESOPHAGOGASTRODUODENOSCOPY (EGD);  Surgeon: Gustavo Mtahew MD;  Location: [facility]     ESOPHAGOSCOPY, GASTROSCOPY, DUODENOSCOPY (EGD), COMBINED N/A 8/13/2021    Procedure: ESOPHAGOGASTRODUODENOSCOPY (EGD) with foreign body removal;  Surgeon: Gustavo Mathew MD;  Location: [facility] GI     ESOPHAGOSCOPY, GASTROSCOPY, DUODENOSCOPY (EGD), COMBINED N/A 1/30/2022    Procedure: ESOPHAGOGASTRODUODENOSCOPY (EGD) FOREIGN BODY REMOVAL;  Surgeon:  Bird Sethi MD;  Location[place] [facility] [place]     ESOPHAGOSCOPY, GASTROSCOPY, DUODENOSCOPY (EGD), COMBINED N/A 2/3/2022    Procedure: ESOPHAGOGASTRODUODENOSCOPY (EGD), FOREIGN BODY REMOVAL;  Surgeon: Binu Vigil MD;  [facility]     ESOPHAGOSCOPY, GASTROSCOPY, DUODENOSCOPY (EGD), COMBINED N/A 2/7/2022    Procedure: ESOPHAGOGASTRODUODENOSCOPY (EGD) WITH FOREIGN BODY REMOVAL;  Surgeon: Darek Mendoza MD;  Location: [facility] OR     ESOPHAGOSCOPY, GASTROSCOPY, DUODENOSCOPY (EGD), COMBINED N/A 2/8/2022    Procedure: ESOPHAGOGASTRODUODENOSCOPY (EGD), foreign body removal;  Surgeon: Lyndsey Mendoza DO;  [facility]     ESOPHAGOSCOPY, GASTROSCOPY, DUODENOSCOPY (EGD), COMBINED N/A 2/15/2022    Procedure: UPPER ESOPHAGOGASTRODUODENOSCOPY, WITH FOREIGN BODY REMOVAL AND USE OF BLANKENSHIP;  Surgeon: Samia Stanton MD;  [facility]     ESOPHAGOSCOPY, GASTROSCOPY, DUODENOSCOPY (EGD), COMBINED N/A 7/9/2022    Procedure: ESOPHAGOGASTRODUODENOSCOPY (EGD) with foreign body extraction;  Surgeon: Felipe Ulloa DO;  [facility]     ESOPHAGOSCOPY, GASTROSCOPY, DUODENOSCOPY (EGD), COMBINED N/A 7/29/2022    Procedure: ESOPHAGOGASTRODUODENOSCOPY (EGD) WITH FOREIGN BODY REMOVAL;  Surgeon: Pamela Perez MD;  [facility]     ESOPHAGOSCOPY, GASTROSCOPY, DUODENOSCOPY (EGD), COMBINED N/A 8/6/2022    Procedure: ESOPHAGOGASTRODUODENOSCOPY, WITH FOREIGN BODY REMOVAL;  Surgeon: Bety Nova MD;  Location: [facility]     ESOPHAGOSCOPY, GASTROSCOPY, DUODENOSCOPY (EGD), COMBINED N/A 8/13/2022    Procedure: ESOPHAGOGASTRODUODENOSCOPY, WITH FOREIGN BODY REMOVAL using raptor device;  Surgeon: Brice Ramirez MD;  Location: [facility]     ESOPHAGOSCOPY, GASTROSCOPY, DUODENOSCOPY (EGD), COMBINED N/A 8/24/2022    Procedure: ESOPHAGOGASTRODUODENOSCOPY (EGD);  Surgeon: Jeffy Bradley MD;  Location:  GI     ESOPHAGOSCOPY, GASTROSCOPY, DUODENOSCOPY (EGD), COMBINED N/A 9/17/2022     Procedure: ESOPHAGOGASTRODUODENOSCOPY (EGD), Foreign Body removal;  Surgeon: Pamela Perez MD;  Location:  OR     ESOPHAGOSCOPY, GASTROSCOPY, DUODENOSCOPY (EGD), COMBINED N/A 9/25/2022    Procedure: ESOPHAGOGASTRODUODENOSCOPY, WITH FOREIGN BODY REMOVAL;  Surgeon: Kash Griffin MD;  Location:  GI     ESOPHAGOSCOPY, GASTROSCOPY, DUODENOSCOPY (EGD), COMBINED N/A 10/23/2022    Procedure: ESOPHAGOGASTRODUODENOSCOPY (EGD) FOR REMOVAL OF FOREIGN BODY;  Surgeon: Barney Pinto MD;  Location: [facility] OR     ESOPHAGOSCOPY, GASTROSCOPY, DUODENOSCOPY (EGD), COMBINED N/A 11/3/2022    Procedure: ESOPHAGOGASTRODUODENOSCOPY (EGD) for foreign body removal;  Surgeon: Cruz Kumar MD;  Location: [facility]     ESOPHAGOSCOPY, GASTROSCOPY, DUODENOSCOPY (EGD), COMBINED N/A 11/29/2022    Procedure: ESOPHAGOGASTRODUODENOSCOPY (EGD);  Surgeon: Cristino Kelsey MD, MD;  Location: [facility]     ESOPHAGOSCOPY, GASTROSCOPY, DUODENOSCOPY (EGD), COMBINED N/A 12/8/2022    Procedure: ESOPHAGOGASTRODUODENOSCOPY (EGD) with foreign body removal;  Surgeon: Efrem Begum MD;  Location: [facility]     ESOPHAGOSCOPY, GASTROSCOPY, DUODENOSCOPY (EGD), COMBINED N/A 12/28/2022    Procedure: ESOPHAGOGASTRODUODENOSCOPY, WITH FOREIGN BODY REMOVAL;  Surgeon: Doug Hansen MD;  Location:  GI     ESOPHAGOSCOPY, GASTROSCOPY, DUODENOSCOPY (EGD), COMBINED N/A 1/20/2023    Procedure: ESOPHAGOGASTRODUODENOSCOPY (EGD);  Surgeon: Bety Nova MD;  Location:  GI     ESOPHAGOSCOPY, GASTROSCOPY, DUODENOSCOPY (EGD), COMBINED N/A 3/11/2023    Procedure: ESOPHAGOGASTRODUODENOSCOPY WITH FOREIGN BODY REMOVAL;  Surgeon: Cruz Kumar MD;  Location: [facility]     ESOPHAGOSCOPY, GASTROSCOPY, DUODENOSCOPY (EGD), COMBINED N/A 10/16/2023    Procedure: ESOPHAGOGASTRODUODENOSCOPY (EGD) WITH FOREIGN BODY REMOVAL;  Surgeon: Cruz Kumar MD;  Location: [facility] OR      ESOPHAGOSCOPY, GASTROSCOPY, DUODENOSCOPY (EGD), COMBINED N/A 10/29/2023    Procedure: ESOPHAGOGASTRODUODENOSCOPY, WITH FOREIGN BODY REMOVAL;  Surgeon: Kash Griffin MD;  Location: [facility]     ESOPHAGOSCOPY, GASTROSCOPY, DUODENOSCOPY (EGD), DILATATION, COMBINED N/A 8/30/2021    Procedure: ESOPHAGOGASTRODUODENOSCOPY, WITH DILATION (mngi);  Surgeon: Pat Cervantes MD;  Location: [facility] OR     EXAM UNDER ANESTHESIA ANUS N/A 1/10/2017    Procedure: EXAM UNDER ANESTHESIA ANUS;  Surgeon: Annmarie Haynes MD;  [facility]     EXAM UNDER ANESTHESIA RECTUM N/A 7/19/2018    Procedure: EXAM UNDER ANESTHESIA RECTUM;  EXAM UNDER ANESTHESIA, REMOVAL OF RECTAL FOREIGN BODY;  Surgeon: Annmarie Haynes MD;  [facility]     HC REMOVE FECAL IMPACTION OR FB W ANESTHESIA N/A 12/18/2016    Procedure: REMOVE FECAL IMPACTION/FOREIGN BODY UNDER ANESTHESIA;  Surgeon: Soham Cano MD;  Location: [facility] OR     KNEE SURGERY Right      KNEE SURGERY - removed a small tissue mass from knee Right      LAPAROSCOPIC ABLATION ENDOMETRIOSIS       LAPAROTOMY EXPLORATORY N/A 1/10/2017    Procedure: LAPAROTOMY EXPLORATORY;  Surgeon: Annmarie Haynes MD;  [facility]     LAPAROTOMY EXPLORATORY  09/11/2019    Manual manipulation of bowel to remove pill bottle in rectum     lymph nodes removed from neck; benign  age 7     MAMMOPLASTY REDUCTION Bilateral      OTHER SURGICAL HISTORY      foreign body anus removal     [place] ESOPHAGOGASTRODUODENOSCOPY TRANSORAL DIAGNOSTIC N/A 1/5/2019    Procedure: ESOPHAGOGASTRODUODENOSCOPY (EGD) with foreign body removal using raptor;  Surgeon: Lila Sol MD;  Location: [facility];  Service: Gastroenterology     [place] ESOPHAGOGASTRODUODENOSCOPY TRANSORAL DIAGNOSTIC N/A 1/25/2019    Procedure: ESOPHAGOGASTRODUODENOSCOPY (EGD) removal of foreign body;  Surgeon: Binu Vigil MD;  Location: [facility] OR;  Service: Gastroenterology     [place]  ESOPHAGOGASTRODUODENOSCOPY TRANSORAL DIAGNOSTIC N/A 1/31/2019    Procedure: ESOPHAGOGASTRODUODENOSCOPY (EGD);  Surgeon: Siddharth Spears MD;  Location: [facility];  Service: Gastroenterology     MO ESOPHAGOGASTRODUODENOSCOPY TRANSORAL DIAGNOSTIC N/A 8/17/2019    Procedure: ESOPHAGOGASTRODUODENOSCOPY (EGD) with foreign body removal;  Surgeon: Darek Lucero MD;  Location: [facility];  Service: Gastroenterology     MO ESOPHAGOGASTRODUODENOSCOPY TRANSORAL DIAGNOSTIC N/A 9/29/2019    Procedure: ESOPHAGOGASTRODUODENOSCOPY (EGD) with foreign body removal;  Surgeon: Bailey Arnold MD;  Location: [facility];  Service: Gastroenterology     MO ESOPHAGOGASTRODUODENOSCOPY TRANSORAL DIAGNOSTIC N/A 10/3/2019    Procedure: ESOPHAGOGASTRODUODENOSCOPY (EGD), REMOVAL OF FOREIGN BODY;  Surgeon: Chris Lira MD;  Location: [facility];  Service: Gastroenterology     MO ESOPHAGOGASTRODUODENOSCOPY TRANSORAL DIAGNOSTIC N/A 10/6/2019    Procedure: ESOPHAGOGASTRODUODENOSCOPY (EGD) with attempted foreign body removal;  Surgeon: Felipe Connolly MD;  Location: [facility];  Service: Gastroenterology     MO ESOPHAGOGASTRODUODENOSCOPY TRANSORAL DIAGNOSTIC N/A 12/15/2019    Procedure: ESOPHAGOGASTRODUODENOSCOPY (EGD) with foreign body removal;  Surgeon: Jeffy Zuñiga MD;  Location: [facility];  Service: Gastroenterology     MO ESOPHAGOGASTRODUODENOSCOPY TRANSORAL DIAGNOSTIC N/A 12/17/2019    Procedure: ESOPHAGOGASTRODUODENOSCOPY (EGD) with attempted foreign body removal;  Surgeon: Felipe Connolly MD;  Location: [facility];  Service: Gastroenterology     MO ESOPHAGOGASTRODUODENOSCOPY TRANSORAL DIAGNOSTIC N/A 12/21/2019    Procedure: ESOPHAGOGASTRODUODENOSCOPY (EGD) FOR FROEIGN BODY REMOVAL;  Surgeon: Binu Vigil MD;  Location: [facility];  Service: Gastroenterology     MO ESOPHAGOGASTRODUODENOSCOPY TRANSORAL DIAGNOSTIC N/A 7/22/2020    Procedure:  ESOPHAGOGASTRODUODENOSCOPY (EGD);  Surgeon: Bailey Arnold MD;  Location: [facility];  Service: Gastroenterology     MT ESOPHAGOGASTRODUODENOSCOPY TRANSORAL DIAGNOSTIC N/A 8/14/2020    Procedure: ESOPHAGOGASTRODUODENOSCOPY (EGD) FOREIGN BODY REMOVAL;  Surgeon: Jeffy Zuñiga MD;  Location: [facility];  Service: Gastroenterology     MT ESOPHAGOGASTRODUODENOSCOPY TRANSORAL DIAGNOSTIC N/A 2/25/2021    Procedure: ESOPHAGOGASTRODUODENOSCOPY (EGD) with foreign body reoval;  Surgeon: Bird Sethi MD;  Location: [facility];  Service: Gastroenterology     MT ESOPHAGOGASTRODUODENOSCOPY TRANSORAL DIAGNOSTIC N/A 4/19/2021    Procedure: ESOPHAGOGASTRODUODENOSCOPY (EGD);  Surgeon: Libia Rose MD;  Location: [facility];  Service: Gastroenterology     MT SURG DIAGNOSTIC EXAM, ANORECTAL N/A 2/5/2020    Procedure: EXAM UNDER ANESTHESIA, Flexible Sigmoidoscopy, Retrieval of Foreign Body;  Surgeon: Sasha Ivan MD;  Location: [facility];  Service: General     RELEASE CARPAL TUNNEL Bilateral      RELEASE CARPAL TUNNEL Bilateral      REMOVAL, FOREIGN BODY, RECTUM N/A 7/21/2021    Procedure: MANUAL RETREIVALOF FOREIGN OBJECT- RECTUM.;  Surgeon: Aleksandra Gerber MD;  Location: [facility]     SIGMOIDOSCOPY FLEXIBLE N/A 1/10/2017    Procedure: SIGMOIDOSCOPY FLEXIBLE;  Surgeon: Annmarie Haynes MD;  Location:  OR     SIGMOIDOSCOPY FLEXIBLE N/A 5/8/2018    Procedure: SIGMOIDOSCOPY FLEXIBLE;  flex sig with foreign body removal using snare and rattooth forcep;  Surgeon: Soham Cano MD;  Location: [facility]     SIGMOIDOSCOPY FLEXIBLE N/A 2/20/2019    Procedure: Exam under anesthesia Colonoscopy with attempted  removal of rectal foreign body;  Surgeon: Estrada Chávez MD;  Location:  OR       Social History     Socioeconomic History     Marital status: Single     Spouse name: Not on file     Number of children: Not on file     Years of education: Not on file     Highest  education level: Not on file   Occupational History     Occupation: On disability   Tobacco Use     Smoking status: Never     Smokeless tobacco: Never   Vaping Use     Vaping Use: Not on file   Substance and Sexual Activity     Alcohol use: No     Alcohol/week: 0.0 standard drinks of alcohol     Drug use: No     Sexual activity: Not Currently     Partners: Male     Birth control/protection: I.U.D.     Comment: IUD - Mirena - placed July, 2015   Other Topics Concern     Parent/sibling w/ CABG, MI or angioplasty before 65F 55M? Not Asked   Social History Narrative    Single.    Living in independent living portion of [employer].    On disability.    No regular exercise.      Social Determinants of Health     Financial Resource Strain: Not on file   Food Insecurity: Not on file   Transportation Needs: Not on file   Physical Activity: Not on file   Stress: Not on file   Social Connections: Not on file   Interpersonal Safety: Not on file   Housing Stability: Not on file       Family History   Problem Relation Age of Onset     Diabetes Type 2  Maternal Grandmother      Diabetes Type 2  Paternal Grandmother      Breast Cancer Paternal Grandmother      Cerebrovascular Disease Father 53     Myocardial Infarction No family hx of      Coronary Artery Disease Early Onset No family hx of      Ovarian Cancer No family hx of      Colon Cancer No family hx of      Depression Mother      Anxiety Disorder Mother      Family history reviewed and edited as appropriate    Medications and Allergies:     Outpatient Encounter Medications as of 1/10/2024   Medication Sig Dispense Refill     acetaminophen (TYLENOL) 500 MG tablet Take 2 tablets (1,000 mg) by mouth every 6 hours as needed for pain or fever 60 tablet 0     albuterol (PROAIR HFA/PROVENTIL HFA/VENTOLIN HFA) 108 (90 Base) MCG/ACT inhaler Inhale 2 puffs into the lungs every 6 hours as needed for shortness of breath / dyspnea or wheezing 18 g 0     albuterol (PROVENTIL)  (2.5 MG/3ML) 0.083% neb solution Take 1 vial (2.5 mg) by nebulization every 6 hours as needed for shortness of breath or wheezing 90 mL 0     [place] Saline Nasal No-Drip GEL Spray 1 Application in nostril daily Apply into each nare 2 times daily Place in front of each side of your nose and breathe in to distribute gel daily. - Each Nare 22 mL 11     BANOPHEN 2-0.1 % external cream Apply 1 applicator topically 2 times daily as needed for itching (Patient not taking: Reported on 12/1/2023)       benzonatate (TESSALON) 100 MG capsule Take 1 capsule (100 mg) by mouth 3 times daily as needed for cough 12 capsule 0     brexpiprazole (REXULTI) 1 MG tablet Take 1 mg by mouth at bedtime       cetirizine (ZYRTEC) 10 MG tablet Take 1 tablet (10 mg) by mouth daily 30 tablet 0     Cholecalciferol (D3 HIGH POTENCY) 25 MCG (1000 UT) CAPS Take 50 mcg by mouth daily       clonazePAM (KLONOPIN) 0.5 MG tablet Take 0.5mg daily PRN anxiety- 20 tablets per month, try to keep it to 3-4x per week       cyclobenzaprine (FLEXERIL) 10 MG tablet Take 1 tablet (10 mg) by mouth 3 times daily as needed for muscle spasms 20 tablet 0     ferrous sulfate (FEROSUL) 325 (65 Fe) MG tablet Take 1 tablet (325 mg) by mouth daily (with breakfast) 30 tablet 0     fluocinonide (LIDEX) 0.05 % external cream Apply 1 Application topically 2 times daily as needed Apply thinly to knee 2 times daily, Monday through Fridays, off on weekends as needed. Avoid face and skin folds. (Patient not taking: Reported on 12/1/2023)       furosemide (LASIX) 20 MG tablet Take 20 mg by mouth daily       hydroxychloroquine (PLAQUENIL) 200 MG tablet Take 200 mg by mouth daily       metFORMIN (GLUCOPHAGE XR) 500 MG 24 hr tablet Take 1,000 mg by mouth daily (with breakfast)       montelukast (SINGULAIR) 10 MG tablet Take 10 mg by mouth every evening       nabumetone (RELAFEN) 750 MG tablet Take 750 mg by mouth 2 times daily       norethindrone (AYGESTIN) 5 MG tablet Take 5 mg by mouth  daily       omeprazole (PRILOSEC) 40 MG DR capsule Take 1 capsule (40 mg) by mouth 2 times daily (before meals) 180 capsule 3     ondansetron (ZOFRAN-ODT) 4 MG ODT tab Take 1 tablet (4 mg) by mouth every 8 hours as needed for nausea 15 tablet 0     pregabalin (LYRICA) 100 MG capsule Take 100 mg by mouth every morning       pregabalin (LYRICA) 100 MG capsule Take 200 mg by mouth at bedtime       pseudoePHEDrine (SUDAFED) 60 MG tablet Take 1 tablet (60 mg) by mouth every 4 hours as needed for congestion 20 tablet 0     Respiratory Therapy Supplies (NEBULIZER) BRENDAN Nebulizer device.  Albuterol nebulization every 2 hours as needed for shortness of breath or wheezing. 1 each 0     Semaglutide-Weight Management (WEGOVY) 1 MG/0.5ML pen Inject 1 mg Subcutaneous once a week 2 mL 2     sodium chloride (OCEAN) 0.65 % nasal spray Spray 2 sprays in each side of the nose every 3 hours while awake. 44 mL 11     SUMAtriptan (IMITREX) 25 MG tablet Take 25 mg by mouth at onset of headache for migraine May repeat in 2 hours.       valACYclovir (VALTREX) 1000 mg tablet Take 2,000 mg by mouth 2 times daily as needed Take 2 tablets by mouth two times daily for one day. Use as needed at onset of cold sore.       No facility-administered encounter medications on file as of 1/10/2024.        Allergies   Allergen Reactions     Amoxicillin-Pot Clavulanate Other (See Comments), Swelling and Rash     PN: facial swelling, left side. Also had cortisone injection the same day as she started the Augmentin.  Noted in downtime recovery of chart.    PN: facial swelling, left side. Also had cortisone injection the same day as she started the Augmentin.; HUT Comment: PN: facial swelling, left side. Also had cortisone injection the same day as she started the Augmentin.Noted in downtime recovery of chart.; HUT Reaction: Rash; HUT Reaction: Unknown; HUT Reaction Type: Allergy; HUT Severity: Med; HUT Noted: [id number]  PN: facial swelling, left side. Also  had cortisone injection the same day as she started the Augmentin.  Other reaction(s): *Unknown  PN: facial swelling, left side. Also had cortisone injection the same day as she started the Augmentin.  Noted in downtime recovery of chart.  PN: facial swelling, left side. Also had cortisone injection the same day as she started the Augmentin.  Other reaction(s): Facial swelling  Other reaction(s): Facial swelling     Hydrocodone Nausea and Vomiting and GI Disturbance     vomiting for days, PN: vomiting for days; HUT Comment: vomiting for days; HUT Reaction: Gastrointestinal; HUT Reaction: Nausea And Vomiting; HUT Reaction Type: Intolerance; HUT Severity: Med; HUT Noted: [id number]  vomiting for days    Other reaction(s): Rash     Hydrocodone-Acetaminophen Nausea and Vomiting and Rash     Update on 12/12  Pt says she can take tylenol just not the hydrocodone.   Other reaction(s): Rash       Influenza Vaccines Other (See Comments) and Nausea and Vomiting     HUT Reaction: Nausea And Vomiting; HUT Reaction Type: Intolerance; HUT Severity: Low; HUT Noted: [id number]     Latex Rash     HUT Reaction: Rash; HUT Reaction Type: Allergy; HUT Severity: Low; HUT Noted: [id number]  Other reaction(s): Rash       Oseltamivir Hives     med stopped, PN: med stopped  med stopped, PN: med stopped; HUT Comment: med stopped, PN: med stopped; HUT Reaction: Hives; HUT Reaction Type: Allergy; HUT Severity: Med; HUT Noted: [id number]     Penicillins Anaphylaxis     HUT Reaction: Anaphylaxis; HUT Reaction Type: Allergy; HUT Severity: High; HUT Noted: [id number]     Vancomycin Itching, Swelling and Rash     Other reaction(s): Redness  Flushed face, very itchy; HUT Comment: Flushed face, very itchy; HUT Reaction: Angioedema; HUT Reaction: Redness; HUT Severity: Med; HUT Noted: [id number]    facial     Blood-Group Specific Substance Other (See Comments)     Patient has an anti-Cw and non-specific antibodies. Blood product orders may be delayed. Draw one red  top and two purple top tubes for all type/screen/crossmatch orders.  Patient has anti-Cw, anti-K (Angella), Warm auto and nonspecific antibodies. Blood products may be delayed. Draw patient 24 hours prior to transfusion. Draw one red top and two purple top tubes for all type and screen orders.     Clavulanic Acid Angioedema     Fentanyl Itching     Haemophilus B Polysaccharide Vaccine Nausea and Vomiting     Naltrexone Other (See Comments)     Reaction(s): Vivid dreams.     Other Drug Allergy (See Comments)      See original file MRN [id number]. Files are marked for merge     Oxycodone Swelling     Adhesive Tape Rash     Silicone type  Silicone type    Other reaction(s): adhesive allergy  Other reaction(s): adhesive allergy  Silicone type    Other reaction(s): adhesive allergy       Band-Aid Anti-Itch      Other reaction(s): adhesive allergy     Cephalosporins Rash     Lamotrigine Rash     Possibly associated with Lamictal.   HUT Comment: Possibly associated with Lamictal. ; HUT Reaction: Rash; HUT Reaction Type: Allergy; HUT Severity: Low; HUT Noted: [id number]     No Clinical Screening - See Comments Rash and Other (See Comments)     Silicone type  Silicone type  See original file MRN [id number]. Files are marked for merge  History of swallowing sharp metallic objects. She should not be prescribed lancets due to posed risk of swallowing.         Review of systems:  A full 10 point review of systems was obtained and was negative except for the pertinent positives and negatives stated within the HPI.    Objective Findings:   Physical Exam:    Constitutional: LMP 10/09/2023 (Exact Date)   General: Alert, oriented.   Head: Atraumatic, normocephalic, no obvious abnormalities   Eyes: Sclera anicteric, no obvious conjunctival hemorrhage   Nose: Nares normal, no obvious malformation, no obvious rhinorrhea   Respiratory: Normal appearing respirations, no cough, no apparent distress  Musculoskeletal: Range of motion intact, no  obvious strength deficit  Skin: No jaundice, no obvious rash  Neurologic: AAOx3, no obvious neurologic abnormality.   Psychiatric: Normal Affect, appropriate mood  Extremities: No obvious edema, no obvious malformation     Labs, Radiology, Pathology     Lab Results   Component Value Date    WBC 3.9 (L) 01/02/2024    WBC 5.9 12/13/2023    WBC 6.6 11/21/2023    HGB 13.3 01/02/2024    HGB 14.6 12/13/2023    HGB 13.6 11/21/2023     01/02/2024     12/13/2023     11/21/2023    CHOL 132 02/11/2022    CHOL 130 11/30/2020    CHOL 132 03/21/2018    TRIG 266 (H) 02/11/2022    TRIG 182 (H) 11/30/2020    TRIG 125 03/21/2018    HDL 41 (L) 02/11/2022    HDL 44 (L) 11/30/2020    HDL 48 (L) 03/21/2018    ALT 27 12/13/2023    ALT 13 11/21/2023    ALT 23 11/05/2023    AST 25 12/13/2023    AST 17 11/21/2023    AST 14 11/05/2023     01/02/2024     12/13/2023     11/21/2023    BUN 9.1 01/02/2024    BUN 12.6 12/13/2023    BUN 7.9 11/21/2023    CO2 25 01/02/2024    CO2 22 12/13/2023    CO2 21 (L) 11/21/2023    TSH 4.47 (H) 06/27/2023    TSH 4.94 (H) 02/11/2022    TSH 3.19 11/30/2020    INR 1.11 01/15/2023    INR 1.06 12/28/2022    INR 1.03 10/15/2022        Liver Function Studies -   Recent Labs   Lab Test 01/05/23  1905   PROTTOTAL 6.6   ALBUMIN 3.6   BILITOTAL 0.2   ALKPHOS 70   AST 24   ALT 30          Patient Active Problem List    Diagnosis Date Noted     Right leg paresthesias 05/24/2023     Priority: Medium     Right leg weakness 05/24/2023     Priority: Medium     Right low back pain, unspecified chronicity, unspecified whether sciatica present 05/24/2023     Priority: Medium     Foot drop, left 05/24/2023     Priority: Medium     Diarrhea, unspecified type 01/30/2023     Priority: Medium     Muscle strain of thigh, right, initial encounter 01/11/2023     Priority: Medium     Foreign body ingestion 09/16/2022     Priority: Medium     Foreign body ingestion, initial encounter 02/10/2022      Priority: Medium     Suicide gesture, subsequent encounter (H24) 11/27/2020     Priority: Medium     Gallstones 10/30/2020     Priority: Medium     RUQ abdominal pain 10/30/2020     Priority: Medium     Swallowed foreign body, initial encounter 01/12/2020     Priority: Medium     Swallowed foreign body, initial encounter 01/12/2020     Priority: Medium     Added automatically from request for surgery [id number]       Foreign body in digestive system 12/18/2019     Priority: Medium     Pulmonary embolism (H) 11/30/2019     Priority: Medium     Esophageal stricture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

## 2024-01-16 ENCOUNTER — PRE VISIT (OUTPATIENT)
Dept: SURGERY | Facility: CLINIC | Age: 34
End: 2024-01-16

## 2024-01-19 DIAGNOSIS — T18.9XXS SWALLOWED FOREIGN BODY, SEQUELA: ICD-10-CM

## 2024-01-19 DIAGNOSIS — K21.9 GASTROESOPHAGEAL REFLUX DISEASE, UNSPECIFIED WHETHER ESOPHAGITIS PRESENT: ICD-10-CM

## 2024-01-19 RX ORDER — OMEPRAZOLE 40 MG/1
40 CAPSULE, DELAYED RELEASE ORAL DAILY
Qty: 90 CAPSULE | Refills: 0 | Status: SHIPPED | OUTPATIENT
Start: 2024-01-19 | End: 2024-03-15

## 2024-01-22 ENCOUNTER — MYC MEDICAL ADVICE (OUTPATIENT)
Dept: GASTROENTEROLOGY | Facility: CLINIC | Age: 34
End: 2024-01-22
Payer: COMMERCIAL

## 2024-01-22 NOTE — LETTER
To whom it may concern,    Nevin Alvarado is no longer using the Miralax.  This medication was removed from her active medication list in December of 2023.      Thank You  Carli Potter's Care Team.

## 2024-01-23 ENCOUNTER — APPOINTMENT (OUTPATIENT)
Dept: GENERAL RADIOLOGY | Facility: CLINIC | Age: 34
End: 2024-01-23
Attending: EMERGENCY MEDICINE
Payer: COMMERCIAL

## 2024-01-23 ENCOUNTER — HOSPITAL ENCOUNTER (EMERGENCY)
Facility: CLINIC | Age: 34
Discharge: LEFT BEFORE DISCHARGE | End: 2024-01-23
Admitting: EMERGENCY MEDICINE
Payer: COMMERCIAL

## 2024-01-23 VITALS
BODY MASS INDEX: 47.84 KG/M2 | SYSTOLIC BLOOD PRESSURE: 145 MMHG | HEIGHT: 62 IN | RESPIRATION RATE: 20 BRPM | TEMPERATURE: 97.4 F | WEIGHT: 260 LBS | DIASTOLIC BLOOD PRESSURE: 82 MMHG | HEART RATE: 94 BPM | OXYGEN SATURATION: 96 %

## 2024-01-23 PROCEDURE — 99281 EMR DPT VST MAYX REQ PHY/QHP: CPT

## 2024-01-23 PROCEDURE — 250N000013 HC RX MED GY IP 250 OP 250 PS 637: Performed by: EMERGENCY MEDICINE

## 2024-01-23 PROCEDURE — 73502 X-RAY EXAM HIP UNI 2-3 VIEWS: CPT

## 2024-01-23 RX ORDER — ACETAMINOPHEN 500 MG
1000 TABLET ORAL ONCE
Status: COMPLETED | OUTPATIENT
Start: 2024-01-23 | End: 2024-01-23

## 2024-01-23 RX ADMIN — ACETAMINOPHEN 1000 MG: 500 TABLET, FILM COATED ORAL at 19:04

## 2024-01-23 NOTE — ED TRIAGE NOTES
"At [facility], felt a \"pop in left hip\" fell to the ground, pt reports severe left hip pain. Given 100 mcg IM by EMS and 4mg of Zofran ODT.      Triage Assessment (Adult)       Row Name 01/23/24 1753 01/23/24 1748       Triage Assessment    Airway WDL WDL WDL                    "

## 2024-01-25 ENCOUNTER — MYC REFILL (OUTPATIENT)
Dept: PLASTIC SURGERY | Facility: CLINIC | Age: 34
End: 2024-01-25

## 2024-01-25 NOTE — PROGRESS NOTES
"Video-Visit Details    Type of service:  Video Visit    Video Start Time: 1:29 PM  Video End Time: 1:57 PM    Originating Location (pt. Location): Home    Distant Location (provider location): Offsite (providers home) [facility] [place]     Platform used for Video Visit: [facility]    Return Bariatric Nutrition Consultation Note    Reason For Visit: Nutrition Assessment    Nevin Alvarado is a 33 year old presenting today for return bariatric nutrition consult.  Provided the weight loss surgery nutrition class information during this visit.  Pt is interested in laparoscopic sleeve gastrectomy in the future.  Patient is accompanied by self.  This is pt's 4th nutrition visit.    Pt referred by Sharon Toro NP on September 12, 2023.  CO-MORBIDITIES OF OBESITY INCLUDE:        9/12/2023    12:48 PM   --   I have the following health issues associated with obesity Type II Diabetes    High Blood Pressure    Sleep Apnea    Polycystic Ovarian Syndrome    GERD (Reflux)    Fatty Liver    Asthma    Stress Incontinence     SUPPORT:      9/12/2023    12:48 PM   Support System Reviewed With Patient   Who do you have in your support network that can be available to help you for the first 2 weeks after surgery? I live in a group home with 24 hour staff, mom   Who can you count on for support throughout your weight loss surgery journey? a friend, and my therapist     ANTHROPOMETRICS:  Initial Consult Weight: 309 lb on 9/13/23  Estimated body mass index is 47.54 kg/m  as calculated from the following:    Height as of this encounter: 1.575 m (5' 2.01\").    Weight as of this encounter: 117.9 kg (260 lb).  Current Weight: 260 lb per pt report     Required weight loss goal pre-op: -20 lbs from initial consult weight (goal weight 289 lbs or less before surgery) - met        9/12/2023    12:48 PM   --   I have tried the following methods to lose weight Watching portions or calories    Exercise    Pre " packaged meals ex: Nutrisystem    OTC Medications    Prescription Medications         9/12/2023    12:48 PM   Weight Loss Questions Reviewed With Patient   How long have you been overweight? Since late teens through early 20's     MEDICATION FOR WEIGHT LOSS:  topiramate- took in 2014 for mood- caused anorexia   Naltrexone - suicidal thoughts   Rybelsus - has been on since January 2023.     Has been getting splotchy/redness on her face. Losing handfuls of hair since starting the Wegovy.     Hx of self harm- swallowing thing- started after she was treated for anorexia when taking topiramate- in 6 months has only had one day of self harm- this was 2 weeks ago and instead of swallowing she inserted something per rectum. - Followed by MASON Potter PA-C, PE in 2019 after esophageal perforation repair     VITAMIN/MINERAL SUPPLEMENTS:  Iron     NUTRITION HISTORY:  Food allergies:NKFA  Food intolerances: None  Previous experience with diet modification for weight loss: Nutrisystem, prescription medications, exercise, watching calories or portions     Has been meal planning for the past 3 months. Likes chicken, turkey, shrimp.      October 2023: Eating 3 meals per day. Lunch and dinner meals have been chili or mediterranean orzo salad with artichokes more recently. Drinks mostly water, Cup of coffee, [employer]. Doesn't drink soda or juice. Uses a walker for neuropathy. Walking around more often/standing longer which has been an improvement.     November 2023: Reports she has been sick a lot recently. Sometimes feels she has to force herself to eat. Currently has a cold. Working with a GI doctor right now also as she has been having some GI symptoms after eating certain foods. Reports she had another self harm episode unfortunately, feels this will delay her chances for surgery.     January 2024: Had been dealing with illness recently which set her back a little bit on her goals. Has been using Wegovy. Feels  since starting the Wegovy her face gets puffy or red blotches on it, plans to keep a close eye on it before deciding to stop. Had been eating very well but finding more temptations now. Feels she is not cooking as often and doing more frozen meals.  Hasn't been using walker for a few weeks.         9/12/2023    12:48 PM   Recall Diet Questions Reviewed With Patient   Describe what you typically consume for breakfast (typical or most recent) eggs and hash browns, homemade waffles, laith pudding with fruit   Describe what you typically consume for lunch (typical or most recent) homemade lunchables, some pasta or chicken   Describe what you typically consume for supper (typical or most recent) low calorie meal prep meals   Describe what you typically consume as snacks (typical or most recent) cheese sticks, fruit jerky, fruits/vegetables   How many ounces of water, or other low calorie drinks, do you drink daily (8 oz=1 glass)? 48 oz   How many ounces of caffeine (coffee, tea, pop) do you drink daily (8 oz=1 glass)? 16 oz   How many ounces of carbonated (pop, beer, sparkling water) drinks do you drinky daily (8 oz=1 glass)? 0 oz   How many ounces of juice, pop, sweet tea, sports drinks, protein drinks, other sweetened drinks, do you drink daily (8 oz=1 glass)? 0 oz   How many ounces of milk do you drink daily (8 oz=1 glass) 0 oz   How often do you drink alcohol? Never           9/12/2023    12:48 PM   Eating Habits   What foods do you crave? ice cream, chocolate, sometimes just salty chips           9/12/2023    12:48 PM   Dining Out History Reviewed With Patient   How often do you dine out? Rarely.   Where do you dine out? (select all that apply) take out   What types of food do you order when you dine out? jitendra verdin     EXERCISE:        9/12/2023    12:48 PM   --   How often do you exercise? Less than 1 time per week   What is the duration of your exercise (in minutes)? 10 Minutes   What types of exercise do you do?  other   What keeps you from being more active? Pain    My ability to walk or move around is limited    Shortness of breath    Too tired     NUTRITION DIAGNOSIS:  Obesity r/t long history of positive energy balance aeb BMI >30 kg/m2.    INTERVENTION:  Intervention Provided/Education Provided on post-op diet guidelines, vitamins/minerals essential post-operatively, GI anatomy of bariatric surgeries, ways to help prepare for post-op diet guidelines pre-operatively, portion/calorie-control, mindful eating and sources of protein.  Patient demonstrates understanding.     Personal barriers to making and continuing required life changes have been identified, and strategies to overcome those barriers have been recommended AND family and social supports have been assessed and strategies to strengthen those supports have been recommended.    Provided pt with list of goals, RD contact information and resources listed below via [employer].             9/12/2023    12:48 PM   Questions Reviewed With Patient   How ready are you to make changes regarding your weight? Number 1 = Not ready at all to make changes up to 10 = very ready. 10   How confident are you that you can change? 1 = Not confident that you will be successful making changes up to 10 = very confident. 7     Expected Engagement: good    GOALS:  Relating To Eating:  - Eat slowly (20-30 minutes per meal), chewing foods well (25 chews per bite/applesauce consistency)  - Focus on eating smaller portion sizes at meals and snacks  -Aim to consume 60 grams of protein each day (ex. 20 grams per meal)    Relating to beverages:  - Reduce caffeine/carbonation/calorie containing beverages  - Separate fluids from meals by 30 minutes before, during, and after eating  - Drink 48-64 ounces of fluid per day. Small, frequent sips between meals.    Relating to activity:  Increase activity as able    Protein Sources for Weight Loss  [email or web address]     Quick/Easy Protein  "Sources:  Hard boiled eggs  Part-skim cheese sticks  Baby Bell cheese rounds  Low-fat/low-sugar Greek yogurt  Low-fat cottage cheese  Lean deli meat (chicken/turkey/ham)  Roasted chickpeas or lentils  Nuts   Turkey meat stick  Protein shake/bar  \"P3\" snack (cheese, nuts, deli meat)  [employer] \"Protein Bread\"   \"Egglife\" egg white wrap    Tuna/salmon can/packet     Carbohydrates  [email or web address]     Carbohydrate Counting   [email or web address]      Tips for Low Sodium Diet  [email or web address]    Food Tracking Apps  My Fitness Pal  Lose It   Cronometer     Follow Up: April 11.     Time spent with patient: 28 minutes.  Nevin Birmingham RD, LD      "

## 2024-01-26 ENCOUNTER — HOSPITAL ENCOUNTER (EMERGENCY)
Facility: CLINIC | Age: 34
Discharge: HOME | End: 2024-01-26
Attending: EMERGENCY MEDICINE | Admitting: EMERGENCY MEDICINE
Payer: COMMERCIAL

## 2024-01-26 ENCOUNTER — NURSE TRIAGE (OUTPATIENT)
Dept: NURSING | Facility: CLINIC | Age: 34
End: 2024-01-26

## 2024-01-26 ENCOUNTER — VIRTUAL VISIT (OUTPATIENT)
Dept: ENDOCRINOLOGY | Facility: CLINIC | Age: 34
End: 2024-01-26
Payer: COMMERCIAL

## 2024-01-26 VITALS — BODY MASS INDEX: 47.84 KG/M2 | HEIGHT: 62 IN | WEIGHT: 260 LBS

## 2024-01-26 VITALS
SYSTOLIC BLOOD PRESSURE: 111 MMHG | RESPIRATION RATE: 20 BRPM | OXYGEN SATURATION: 96 % | HEART RATE: 80 BPM | TEMPERATURE: 98 F | DIASTOLIC BLOOD PRESSURE: 66 MMHG

## 2024-01-26 DIAGNOSIS — L29.9 ITCHING: ICD-10-CM

## 2024-01-26 DIAGNOSIS — E66.813 CLASS 3 SEVERE OBESITY WITH SERIOUS COMORBIDITY AND BODY MASS INDEX (BMI) OF 50.0 TO 59.9 IN ADULT, UNSPECIFIED OBESITY TYPE (H): ICD-10-CM

## 2024-01-26 DIAGNOSIS — Z71.3 NUTRITIONAL COUNSELING: Primary | ICD-10-CM

## 2024-01-26 DIAGNOSIS — E66.01 CLASS 3 SEVERE OBESITY WITH SERIOUS COMORBIDITY AND BODY MASS INDEX (BMI) OF 50.0 TO 59.9 IN ADULT, UNSPECIFIED OBESITY TYPE (H): ICD-10-CM

## 2024-01-26 PROCEDURE — 99207 PR NO CHARGE LOS: CPT | Mod: 95

## 2024-01-26 PROCEDURE — 99283 EMERGENCY DEPT VISIT LOW MDM: CPT

## 2024-01-26 PROCEDURE — 97803 MED NUTRITION INDIV SUBSEQ: CPT | Mod: 95

## 2024-01-26 ASSESSMENT — PAIN SCALES - GENERAL: PAINLEVEL: NO PAIN (0)

## 2024-01-26 NOTE — NURSING NOTE
Is the patient currently in the state of [place]? YES    Visit mode:VIDEO    If the visit is dropped, the patient can be reconnected by: VIDEO VISIT: Send to e-mail at: [email or web address]    Will anyone else be joining the visit? NO  (If patient encounters technical issues they should call [phone]    How would you like to obtain your AVS? MyChart    Are changes needed to the allergy or medication list? N/A    Reason for visit: RECHECK ([facility])    Mimi Powers VVF    Pt stated her face is a bit puffy, and thinks it may be from the Wegovy.

## 2024-01-26 NOTE — PATIENT INSTRUCTIONS
"Jay Rooney,     Follow-up with RD on April 11.     Thank you,    Nevin Birmingham, SIDNEY, LD  If you would like to schedule or reschedule an appointment with the RD, please call [phone]    Nutrition Goals  Relating To Eating:  - Eat slowly (20-30 minutes per meal), chewing foods well (25 chews per bite/applesauce consistency)  - Focus on eating smaller portion sizes at meals and snacks  -Aim to consume 60 grams of protein each day (ex. 20 grams per meal)    Relating to beverages:  - Reduce caffeine/carbonation/calorie containing beverages  - Separate fluids from meals by 30 minutes before, during, and after eating  - Drink 48-64 ounces of fluid per day. Small, frequent sips between meals.    Relating to activity:  Increase activity as able    Protein Sources for Weight Loss  [email or web address]     Quick/Easy Protein Sources:  Hard boiled eggs  Part-skim cheese sticks  Baby Bell cheese rounds  Low-fat/low-sugar Greek yogurt  Low-fat cottage cheese  Lean deli meat (chicken/turkey/ham)  Roasted chickpeas or lentils  Nuts   Turkey meat stick  Protein shake/bar  \"P3\" snack (cheese, nuts, deli meat)  Aldi's \"Protein Bread\"   \"Egglife\" egg white wrap    Tuna/salmon can/packet     Carbohydrates  [email or web address]     Carbohydrate Counting   [email or web address]      Tips for Low Sodium Diet  [email or web address]    Food Tracking Apps  My Fitness Pal  Lose It   Cronometer     COMPREHENSIVE WEIGHT MANAGEMENT PROGRAM  VIRTUAL SUPPORT GROUPS    At [facility], our Comprehensive Weight Management program offers on-line support groups for patients who are working on weight loss and considering, preparing for, or have had weight loss surgery.     There is no cost for this opportunity.  You are invited to attend the?Virtual Support Groups?provided by any of the following locations:    [facility] via [employer] io Teams with Roxanna Alonso RN  2.   [facility] via Candid io Teams with " "Bird Franz, PhD, LP  3.   [facility] via [employer] with Margie Stock RN  4.   [facility] via a Zoom Meeting with VICENTA San-    The following Support Group information can also be found on our website:  [email or web address]      [facility] Weight Loss Surgery Support Group  The support group is a patient-lead forum that meets monthly to share experiences, encouragement and education. It is open to those who have had weight loss surgery, are scheduled for surgery, or are considering surgery.   WHEN: This group meets on the 3rd Wednesday of each month from 5:00PM - 6:00PM virtually using Microsoft Teams.   FACILITATOR: Led by Roxanna Greenberg RD, LD, RN, the program's Clinical Coordinator.   TO REGISTER: Please contact the clinic via [email or web address] or call the nurse line directly at [phone] to inform our staff that you would like an invite sent to you and to let us know the email you would like the invite sent to. Prior to the meeting, a link with directions on how to join the meeting will be sent to you.    2024 Meetings   January 17  February 21  March 20  April 17  May 15  Candice 19      [facility] Bariatric Care Support Group?  This is open to all pre- and post- operative bariatric surgery patients as well as their support system.   WHEN: This group meets the 3rd Tuesday of each month from 6:30 PM - 8:00 PM virtually using Microsoft Teams.   FACILITATOR: Led by Bird Franz, Ph.D who is a Licensed Psychologist with the [facility] Comprehensive Weight Management Program.   TO REGISTER: Please send an email to Bird Franz, Ph.D., LP at?[email or web address]?if you would like an invitation to the group. Prior to the meeting, a link with directions on how to join the meeting will be sent to you.    2024 Meetings January 16: \"Medication " "Management and Bariatric Surgery\", Gayle Oconnell, PharmD, Pharmacy Resident at [facility], [place]  February 20: \"A Bariatric Surgery Patient's Perspective\", MYLES Keys, [facility], Behavioral Health Clinician at [facility]  March 19  April 16  May 21  Candice 18: \"Nutritional Labeling\", Dietitian speaker to be announced.  November 19: \"Holiday Eating\", Dietitian speaker to be announced.    [facility] Post-Operative Bariatric Surgery Support Group  This is a support group for [facility] bariatric patients (and those external to [facility]) who have had bariatric surgery and are at least 3 months post-surgery.  WHEN: This support group meets the 4th Wednesday of the month from 11:00 AM - 12:00 PM virtually using Microsoft Teams.   FACILITATOR: Led by Certified Bariatric Nurse, Margie Stock RN.   TO REGISTER: Please send an email to Margie at [email or web address] if you would like an invitation to the group.  Prior to the meeting, a link with directions on how to join the meeting will be sent to you.    2024 Meetings  January 24  February 28  March 27  April 24  May 22  Candice 26    [facility] Healthy Lifestyle Group?  This is a 60 minute virtual coaching group for those who want to lead a healthier lifestyle. Come together to set goals and overcome barriers in a supportive group environment. We will address the four pillars of health: nutrition, exercise, sleep and emotional well-being.  This group is highly recommended for those who are participating in the 24 week Healthy Lifestyle Plan and our Health Coaching sessions.  WHEN: This group meets the 1st Friday of the month, 12:30 PM - 1:30 PM online, via a zoom meeting.    FACILITATOR: Led by National Board Certified Health and , VICENTA San[facility].   TO REGISTER: Please call the Call Center at " "[phone] to register. You will get an appointment to attend in [place]. Fifteen minutes prior to the meeting, complete the e-check in and you will get the link to join the meeting.    There is no charge to attend this group and space is limited.     2024 Meetings  January 5: \"New Years Vision: Manifest your Best 2024!\" (guided imagery,  journaling and discussion)  February 2: \"Let's Talk\"  March 1: \"10 Percent Happier\" by Jovanni Koenig (Book Bites - a guided discussion on the nuggets of wisdom from favorite wellness books, no need to read the book but highly encouraged)  April 5: \"Let's Talk\"  May 3: \"Essentialism: The Disciplined Pursuit of Less\" by Varinder Franz (Book Bites discussion)  June 7: \"Let's Talk\"  July 5: NO MEETING, off for the 4th of July Holiday  August 2: \"The Blue Zones, Secrets for Living a Longer Life\" by Jovanni Jim (Book Bites discussion)        "

## 2024-01-26 NOTE — LETTER
"1/26/2024       RE: Nevin Alvarado  [address] [facility] [address]     Dear Colleague,    Thank you for referring your patient, Nevin Alvarado, to the [facility] [place] at [facility]. Please see a copy of my visit note below.    Video-Visit Details    Type of service:  Video Visit    Video Start Time: 1:29 PM  Video End Time: 1:57 PM    Originating Location (pt. Location): Home    Distant Location (provider location): Offsite (providers home) [facility] [place]     Platform used for Video Visit: oneDrum    Return Bariatric Nutrition Consultation Note    Reason For Visit: Nutrition Assessment    Nevin Alvarado is a 33 year old presenting today for return bariatric nutrition consult.  Provided the weight loss surgery nutrition class information during this visit.  Pt is interested in laparoscopic sleeve gastrectomy in the future.  Patient is accompanied by self.  This is pt's 4th nutrition visit.    Pt referred by Sharon Toro NP on September 12, 2023.  CO-MORBIDITIES OF OBESITY INCLUDE:        9/12/2023    12:48 PM   --   I have the following health issues associated with obesity Type II Diabetes    High Blood Pressure    Sleep Apnea    Polycystic Ovarian Syndrome    GERD (Reflux)    Fatty Liver    Asthma    Stress Incontinence     SUPPORT:      9/12/2023    12:48 PM   Support System Reviewed With Patient   Who do you have in your support network that can be available to help you for the first 2 weeks after surgery? I live in a group home with 24 hour staff, mom   Who can you count on for support throughout your weight loss surgery journey? a friend, and my therapist     ANTHROPOMETRICS:  Initial Consult Weight: 309 lb on 9/13/23  Estimated body mass index is 47.54 kg/m  as calculated from the following:    Height as of this encounter: 1.575 m (5' 2.01\").    " Weight as of this encounter: 117.9 kg (260 lb).  Current Weight: 260 lb per pt report     Required weight loss goal pre-op: -20 lbs from initial consult weight (goal weight 289 lbs or less before surgery) - met        9/12/2023    12:48 PM   --   I have tried the following methods to lose weight Watching portions or calories    Exercise    Pre packaged meals ex: Nutrisystem    OTC Medications    Prescription Medications         9/12/2023    12:48 PM   Weight Loss Questions Reviewed With Patient   How long have you been overweight? Since late teens through early 20's     MEDICATION FOR WEIGHT LOSS:  topiramate- took in 2014 for mood- caused anorexia   Naltrexone - suicidal thoughts   Rybelsus - has been on since January 2023.     Has been getting splotchy/redness on her face. Losing handfuls of hair since starting the Wegovy.     Hx of self harm- swallowing thing- started after she was treated for anorexia when taking topiramate- in 6 months has only had one day of self harm- this was 2 weeks ago and instead of swallowing she inserted something per rectum. - Followed by MASON Potter PA-C, PE in 2019 after esophageal perforation repair     VITAMIN/MINERAL SUPPLEMENTS:  Iron     NUTRITION HISTORY:  Food allergies:NKFA  Food intolerances: None  Previous experience with diet modification for weight loss: Nutrisystem, prescription medications, exercise, watching calories or portions     Has been meal planning for the past 3 months. Likes chicken, turkey, shrimp.      October 2023: Eating 3 meals per day. Lunch and dinner meals have been chili or mediterranean orzo salad with artichokes more recently. Drinks mostly water, Cup of coffee, [employer]. Doesn't drink soda or juice. Uses a walker for neuropathy. Walking around more often/standing longer which has been an improvement.     November 2023: Reports she has been sick a lot recently. Sometimes feels she has to force herself to eat. Currently has a cold.  Working with a GI doctor right now also as she has been having some GI symptoms after eating certain foods. Reports she had another self harm episode unfortunately, feels this will delay her chances for surgery.     January 2024: Had been dealing with illness recently which set her back a little bit on her goals. Has been using Wegovy. Feels since starting the Wegovy her face gets puffy or red blotches on it, plans to keep a close eye on it before deciding to stop. Had been eating very well but finding more temptations now. Feels she is not cooking as often and doing more frozen meals.  Hasn't been using walker for a few weeks.         9/12/2023    12:48 PM   Recall Diet Questions Reviewed With Patient   Describe what you typically consume for breakfast (typical or most recent) eggs and hash browns, homemade waffles, laith pudding with fruit   Describe what you typically consume for lunch (typical or most recent) homemade lunchables, some pasta or chicken   Describe what you typically consume for supper (typical or most recent) low calorie meal prep meals   Describe what you typically consume as snacks (typical or most recent) cheese sticks, fruit jerky, fruits/vegetables   How many ounces of water, or other low calorie drinks, do you drink daily (8 oz=1 glass)? 48 oz   How many ounces of caffeine (coffee, tea, pop) do you drink daily (8 oz=1 glass)? 16 oz   How many ounces of carbonated (pop, beer, sparkling water) drinks do you drinky daily (8 oz=1 glass)? 0 oz   How many ounces of juice, pop, sweet tea, sports drinks, protein drinks, other sweetened drinks, do you drink daily (8 oz=1 glass)? 0 oz   How many ounces of milk do you drink daily (8 oz=1 glass) 0 oz   How often do you drink alcohol? Never           9/12/2023    12:48 PM   Eating Habits   What foods do you crave? ice cream, chocolate, sometimes just salty chips           9/12/2023    12:48 PM   Dining Out History Reviewed With Patient   How often do you  dine out? Rarely.   Where do you dine out? (select all that apply) take out   What types of food do you order when you dine out? edenramesh ortizshelley     EXERCISE:        9/12/2023    12:48 PM   --   How often do you exercise? Less than 1 time per week   What is the duration of your exercise (in minutes)? 10 Minutes   What types of exercise do you do? other   What keeps you from being more active? Pain    My ability to walk or move around is limited    Shortness of breath    Too tired     NUTRITION DIAGNOSIS:  Obesity r/t long history of positive energy balance aeb BMI >30 kg/m2.    INTERVENTION:  Intervention Provided/Education Provided on post-op diet guidelines, vitamins/minerals essential post-operatively, GI anatomy of bariatric surgeries, ways to help prepare for post-op diet guidelines pre-operatively, portion/calorie-control, mindful eating and sources of protein.  Patient demonstrates understanding.     Personal barriers to making and continuing required life changes have been identified, and strategies to overcome those barriers have been recommended AND family and social supports have been assessed and strategies to strengthen those supports have been recommended.    Provided pt with list of goals, RD contact information and resources listed below via [employer] ([place]).             9/12/2023    12:48 PM   Questions Reviewed With Patient   How ready are you to make changes regarding your weight? Number 1 = Not ready at all to make changes up to 10 = very ready. 10   How confident are you that you can change? 1 = Not confident that you will be successful making changes up to 10 = very confident. 7     Expected Engagement: good    GOALS:  Relating To Eating:  - Eat slowly (20-30 minutes per meal), chewing foods well (25 chews per bite/applesauce consistency)  - Focus on eating smaller portion sizes at meals and snacks  -Aim to consume 60 grams of protein each day (ex. 20 grams per meal)    Relating to beverages:  - Reduce  "caffeine/carbonation/calorie containing beverages  - Separate fluids from meals by 30 minutes before, during, and after eating  - Drink 48-64 ounces of fluid per day. Small, frequent sips between meals.    Relating to activity:  Increase activity as able    Protein Sources for Weight Loss  [email or web address]     Quick/Easy Protein Sources:  Hard boiled eggs  Part-skim cheese sticks  Baby Bell cheese rounds  Low-fat/low-sugar Greek yogurt  Low-fat cottage cheese  Lean deli meat (chicken/turkey/ham)  Roasted chickpeas or lentils  Nuts   Turkey meat stick  Protein shake/bar  \"P3\" snack (cheese, nuts, deli meat)  Aldi's \"Protein Bread\"   \"Egglife\" egg white wrap    Tuna/salmon can/packet     Carbohydrates  [email or web address]     Carbohydrate Counting   [email or web address]      Tips for Low Sodium Diet  [email or web address]    Food Tracking Apps  My Fitness Pal  Lose It   Cronometer     Follow Up: April 11.     Time spent with patient: 28 minutes.  Nevin Birmingham RD, LD    "

## 2024-01-27 NOTE — DISCHARGE INSTRUCTIONS
Our allergy specialists will call you to help you to set up a follow up appointment with them in their office to help you to talk about testing for allergies.

## 2024-01-27 NOTE — TELEPHONE ENCOUNTER
"Patient reporting her face around her eyes is swollen and itchy, throat itchy and tickly.  Symptoms started a month and a half ago.  Had started a new dosage of her weekly medication then. Muscles have been more sore.  Has a rash that looks like acne but \"not acne.  Swelling around eyes red and painful.    Disposition to be seen within four hours.  No PCP.  Patient would need ot be seen in the ED.  Patient verbalizes understanding.    Diana Hyman RN  [facility]      Reason for Disposition   [1] Swelling is red AND [2] very painful to touch    Additional Information   Negative: [1] Life-threatening reaction in the past to similar substance (e.g., food, insect bite/sting, medication, etc.) AND [2] < 2 hours since exposure   Negative: Wheezing, stridor, hoarseness, or difficulty breathing   Negative: [1] Tightness in the chest or throat AND [2] begins within 2 hours of exposure to allergic substance   Negative: Difficulty swallowing, drooling or slurred speech   Negative: Difficult to awaken or acting confused (e.g., disoriented, slurred speech)   Negative: Unresponsive, passed out or very weak   Negative: Other symptom of severe allergic reaction  (Exception: Hives or facial swelling alone.)   Negative: Sounds like a life-threatening emergency to the triager   Negative: [1] Widespread hives, itching or facial swelling AND [2] onset < 2 hours of exposure to high-risk allergen (e.g., sting, nuts, 1st dose of antibiotic)   Negative: [1] Vomiting or abdominal cramps AND [2] onset < 2 hours of exposure to high-risk allergen (e.g., sting, nuts, 1st dose of antibiotic)   Negative: [1] Had epinephrine shot AND [2] no symptoms now   Negative: [1] Used asthma inhaler or neb AND [2] no symptoms now   Negative: [1] Took antihistamine (e.g., Benadryl) by mouth AND [2] no symptoms now   Negative: [1] Life-threatening reaction (anaphylaxis) in the past to similar substance (e.g., food, insect bite/sting, chemical, " etc.) AND [2] < 2 hours since exposure   Negative: Unresponsive, passed out or very weak   Negative: Swollen tongue   Negative: Difficulty breathing or wheezing   Negative: Sounds like a life-threatening emergency to the triager   Negative: [1] SEVERE swelling of entire face AND [2] < 2 hours since exposure to high-risk allergen (e.g., peanuts, tree nuts, fish, shellfish or 1st dose of drug) AND [3] no serious symptoms AND [4] no serious allergic reaction in the past   Negative: Fever   Negative: Taking an ACE Inhibitor medicine (e.g., benazepril / LOTENSIN, captopril / CAPOTEN, enalapril / VASOTEC, lisinopril / ZESTRIL)   Negative: Patient sounds very sick or weak to the triager   Negative: Pregnant 20 or more weeks   Negative: Postpartum (from 0 to 6 weeks after delivery)   Negative: SEVERE swelling of the entire face    Protocols used: Yaieyvgxqzw-J-TA, Face Swelling-A-AH

## 2024-01-27 NOTE — ED PROVIDER NOTES
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   Allergen Reactions    Amoxicillin-Pot Clavulanate Other (See Comments), Swelling and Rash     PN: facial swelling, left side. Also had cortisone injection the same day as she started the Augmentin.  Noted in downtime recovery of chart.    PN: facial swelling, left side. Also had cortisone injection the same day as she  started the Augmentin.; HUT Comment: PN: facial swelling, left side. Also had cortisone injection the same day as she started the Augmentin.Noted in downtime recovery of chart.; HUT Reaction: Rash; HUT Reaction: Unknown; HUT Reaction Type: Allergy; HUT Severity: Med; HUT Noted: [id number]  PN: facial swelling, left side. Also had cortisone injection the same day as she started the Augmentin.  Other reaction(s): *Unknown  PN: facial swelling, left side. Also had cortisone injection the same day as she started the Augmentin.  Noted in downtime recovery of chart.  PN: facial swelling, left side. Also had cortisone injection the same day as she started the Augmentin.  Other reaction(s): Facial swelling  Other reaction(s): Facial swelling    Hydrocodone Nausea and Vomiting and GI Disturbance     vomiting for days, PN: vomiting for days; HUT Comment: vomiting for days; HUT Reaction: Gastrointestinal; HUT Reaction: Nausea And Vomiting; HUT Reaction Type: Intolerance; HUT Severity: Med; HUT Noted: [id number]  vomiting for days    Other reaction(s): Rash    Hydrocodone-Acetaminophen Nausea and Vomiting and Rash     Update on 12/12  Pt says she can take tylenol just not the hydrocodone.   Other reaction(s): Rash      Influenza Vaccines Other (See Comments) and Nausea and Vomiting     HUT Reaction: Nausea And Vomiting; HUT Reaction Type: Intolerance; HUT Severity: Low; HUT Noted: [id number]    Latex Rash     HUT Reaction: Rash; HUT Reaction Type: Allergy; HUT Severity: Low; HUT Noted: [id number]  Other reaction(s): Rash      Oseltamivir Hives     med stopped, PN: med stopped  med stopped, PN: med stopped; HUT Comment: med stopped, PN: med stopped; HUT Reaction: Hives; HUT Reaction Type: Allergy; HUT Severity: Med; HUT Noted: [id number]    Penicillins Anaphylaxis     HUT Reaction: Anaphylaxis; HUT Reaction Type: Allergy; HUT Severity: High; HUT Noted: [id number]    Vancomycin Itching, Swelling and Rash     Other reaction(s):  Redness  Flushed face, very itchy; HUT Comment: Flushed face, very itchy; HUT Reaction: Angioedema; HUT Reaction: Redness; HUT Severity: Med; HUT Noted: [id number]    facial    Blood-Group Specific Substance Other (See Comments)     Patient has an anti-Cw and non-specific antibodies. Blood product orders may be delayed. Draw one red top and two purple top tubes for all type/screen/crossmatch orders.  Patient has anti-Cw, anti-K (Aneglla), Warm auto and nonspecific antibodies. Blood products may be delayed. Draw patient 24 hours prior to transfusion. Draw one red top and two purple top tubes for all type and screen orders.    Clavulanic Acid Angioedema    Fentanyl Itching    Haemophilus B Polysaccharide Vaccine Nausea and Vomiting    Naltrexone Other (See Comments)     Reaction(s): Vivid dreams.    Other Drug Allergy (See Comments)      See original file MRN [id number]. Files are marked for merge    Oxycodone Swelling    Adhesive Tape Rash     Silicone type  Silicone type    Other reaction(s): adhesive allergy  Other reaction(s): adhesive allergy  Silicone type    Other reaction(s): adhesive allergy      Band-Aid Anti-Itch      Other reaction(s): adhesive allergy    Cephalosporins Rash    Lamotrigine Rash     Possibly associated with Lamictal.   HUT Comment: Possibly associated with Lamictal. ; HUT Reaction: Rash; HUT Reaction Type: Allergy; HUT Severity: Low; HUT Noted: [id number]    No Clinical Screening - See Comments Rash and Other (See Comments)     Silicone type  Silicone type  See original file MRN [id number]. Files are marked for merge  History of swallowing sharp metallic objects. She should not be prescribed lancets due to posed risk of swallowing.        FAMILY HISTORY:  Family History   Problem Relation Age of Onset    Diabetes Type 2  Maternal Grandmother     Diabetes Type 2  Paternal Grandmother     Breast Cancer Paternal Grandmother     Cerebrovascular Disease Father 53    Myocardial Infarction No family hx  of     Coronary Artery Disease Early Onset No family hx of     Ovarian Cancer No family hx of     Colon Cancer No family hx of     Depression Mother     Anxiety Disorder Mother        SOCIAL HISTORY:   Social History     Socioeconomic History    Marital status: Single   Occupational History    Occupation: On disability   Tobacco Use    Smoking status: Never    Smokeless tobacco: Never   Substance and Sexual Activity    Alcohol use: No     Alcohol/week: 0.0 standard drinks of alcohol    Drug use: No    Sexual activity: Not Currently     Partners: Male     Birth control/protection: I.U.D.     Comment: IUD - Mirena - placed July, 2015   Social History Narrative    Single.    Living in independent living portion of [employer] [facility].    On disability.    No regular exercise.        PHYSICAL EXAM    GENERAL: Awake, alert.  In no acute distress.   HEENT: Normocephalic, atraumatic.  Pupils equal, round and reactive.  Conjunctiva normal.  EOMI.  NECK: No stridor or apparent deformity.  PULMONARY: Symmetrical breath sounds without distress.  Lungs clear to auscultation bilaterally without wheezes, rhonchi or rales.  CARDIO: Regular rate and rhythm.  No significant murmur, rub or gallop.  Radial pulses strong and symmetrical.  ABDOMINAL: Abdomen soft, non-distended and non-tender to palpation.  No CVAT, no palpable hepatosplenomegaly.  EXTREMITIES: No lower extremity swelling or edema.    NEURO: Alert and oriented to person, place and time.  Cranial nerves grossly intact.  No focal motor deficit.  PSYCH: Normal mood and affect  SKIN: No rashes        I, Klaudia Maldonado, am serving as a scribe to document services personally performed by Dr. Mavis Garcia based on my observation and the provider's statements to me. I, Mavis Garcia MD attest that Klaudia Maldonado is acting in a scribe capacity, has observed my performance of the services and has documented them in accordance with my direction.       Mavis Garcia MD  01/26/24  2102

## 2024-01-27 NOTE — ED TRIAGE NOTES
"Reports hive like rash since December  Reported itchy feeling has gotten worse, \"whole body itches and aches\"  Denies sob, no resp distress noted in triage     Triage Assessment (Adult)       Row Name 01/26/24 4233          Triage Assessment    Airway WDL WDL        Respiratory WDL    Respiratory WDL WDL        Skin Circulation/Temperature WDL    Skin Circulation/Temperature WDL WDL        Cardiac WDL    Cardiac WDL WDL        Peripheral/Neurovascular WDL    Peripheral Neurovascular WDL WDL        Cognitive/Neuro/Behavioral WDL    Cognitive/Neuro/Behavioral WDL WDL                     "

## 2024-02-05 ENCOUNTER — TELEPHONE (OUTPATIENT)
Dept: ENDOCRINOLOGY | Facility: CLINIC | Age: 34
End: 2024-02-05
Payer: COMMERCIAL

## 2024-02-05 DIAGNOSIS — E66.01 CLASS 3 SEVERE OBESITY WITH SERIOUS COMORBIDITY AND BODY MASS INDEX (BMI) OF 50.0 TO 59.9 IN ADULT, UNSPECIFIED OBESITY TYPE (H): ICD-10-CM

## 2024-02-05 DIAGNOSIS — E66.813 CLASS 3 SEVERE OBESITY WITH SERIOUS COMORBIDITY AND BODY MASS INDEX (BMI) OF 50.0 TO 59.9 IN ADULT, UNSPECIFIED OBESITY TYPE (H): ICD-10-CM

## 2024-02-05 NOTE — TELEPHONE ENCOUNTER
Patient called requesting refill for Wegovy as a pen had malfunctioned and the company sent her a voucher for a new box and it used up one of her refills

## 2024-02-05 NOTE — TELEPHONE ENCOUNTER
General Call    Contacts         Type Contact Phone/Fax    02/05/2024 12:42 PM CST Phone (Incoming) Nevin Alvarado (Self) [phone] (M)          Reason for Call:  Wegovy 1 mg    What are your questions or concerns:  pt has questions about her Wegovy     Could we send this information to you in [employer] or would you prefer to receive a phone call?:   Patient would prefer a phone call   Okay to leave a detailed message?: Yes at Cell number on file:    Telephone Information:   Mobile [phone]

## 2024-02-08 ENCOUNTER — HOSPITAL ENCOUNTER (EMERGENCY)
Facility: CLINIC | Age: 34
Discharge: HOME | End: 2024-02-08
Attending: STUDENT IN AN ORGANIZED HEALTH CARE EDUCATION/TRAINING PROGRAM | Admitting: STUDENT IN AN ORGANIZED HEALTH CARE EDUCATION/TRAINING PROGRAM
Payer: COMMERCIAL

## 2024-02-08 ENCOUNTER — APPOINTMENT (OUTPATIENT)
Dept: RADIOLOGY | Facility: CLINIC | Age: 34
End: 2024-02-08
Payer: COMMERCIAL

## 2024-02-08 VITALS
WEIGHT: 260 LBS | TEMPERATURE: 97.6 F | BODY MASS INDEX: 47.84 KG/M2 | DIASTOLIC BLOOD PRESSURE: 65 MMHG | RESPIRATION RATE: 24 BRPM | SYSTOLIC BLOOD PRESSURE: 121 MMHG | OXYGEN SATURATION: 97 % | HEART RATE: 88 BPM | HEIGHT: 62 IN

## 2024-02-08 DIAGNOSIS — M25.561 ACUTE PAIN OF BOTH KNEES: ICD-10-CM

## 2024-02-08 DIAGNOSIS — M25.562 ACUTE PAIN OF BOTH KNEES: ICD-10-CM

## 2024-02-08 LAB — HCG UR QL: NEGATIVE

## 2024-02-08 PROCEDURE — 73562 X-RAY EXAM OF KNEE 3: CPT | Mod: 50

## 2024-02-08 PROCEDURE — 81025 URINE PREGNANCY TEST: CPT

## 2024-02-08 PROCEDURE — 250N000013 HC RX MED GY IP 250 OP 250 PS 637: Performed by: STUDENT IN AN ORGANIZED HEALTH CARE EDUCATION/TRAINING PROGRAM

## 2024-02-08 PROCEDURE — 99285 EMERGENCY DEPT VISIT HI MDM: CPT

## 2024-02-08 RX ORDER — METOCLOPRAMIDE 10 MG/1
10 TABLET ORAL ONCE
Status: COMPLETED | OUTPATIENT
Start: 2024-02-08 | End: 2024-02-08

## 2024-02-08 RX ORDER — DIPHENHYDRAMINE HCL 25 MG
25 CAPSULE ORAL EVERY 6 HOURS PRN
Status: DISCONTINUED | OUTPATIENT
Start: 2024-02-08 | End: 2024-02-08 | Stop reason: HOSPADM

## 2024-02-08 RX ADMIN — METOCLOPRAMIDE 10 MG: 10 TABLET ORAL at 19:53

## 2024-02-08 RX ADMIN — DIPHENHYDRAMINE HYDROCHLORIDE 25 MG: 25 CAPSULE ORAL at 19:54

## 2024-02-08 NOTE — ED TRIAGE NOTES
Arrives to ED via [facility] with c/o bilat knee pain, L>R. Had injections to bilat knees on Monday. Pain beginning on Tuesday. Worsening throughout last few days. Tearful on arrival. Has tried ice packs and tylenol without relief. Denies N/T. Unable to bear weight.      Triage Assessment (Adult)       Row Name 02/08/24 3534          Triage Assessment    Airway WDL WDL        Respiratory WDL    Respiratory WDL WDL        Skin Circulation/Temperature WDL    Skin Circulation/Temperature WDL WDL        Cardiac WDL    Cardiac WDL WDL        Peripheral/Neurovascular WDL    Peripheral Neurovascular WDL WDL        Cognitive/Neuro/Behavioral WDL    Cognitive/Neuro/Behavioral WDL WDL

## 2024-02-08 NOTE — ED PROVIDER NOTES
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 (FLEXERIL) 10 MG tablet  ferrous sulfate (FEROSUL) 325 (65 Fe) MG tablet  fluocinonide (LIDEX) 0.05  "% external cream  furosemide (LASIX) 20 MG tablet  hydroxychloroquine (PLAQUENIL) 200 MG tablet  metFORMIN (GLUCOPHAGE XR) 500 MG 24 hr tablet  montelukast (SINGULAIR) 10 MG tablet  nabumetone (RELAFEN) 750 MG tablet  norethindrone (AYGESTIN) 5 MG tablet  omeprazole (PRILOSEC) 40 MG DR capsule  ondansetron (ZOFRAN-ODT) 4 MG ODT tab  pregabalin (LYRICA) 100 MG capsule  pregabalin (LYRICA) 100 MG capsule  pseudoePHEDrine (SUDAFED) 60 MG tablet  Respiratory Therapy Supplies (NEBULIZER) BRENDAN  Semaglutide-Weight Management (WEGOVY) 1 MG/0.5ML pen  SUMAtriptan (IMITREX) 25 MG tablet  valACYclovir (VALTREX) 1000 mg tablet            PHYSICAL EXAM     BP (!) 142/63   Pulse 93   Temp 97.7  F (36.5  C) (Oral)   Resp 20   Ht 1.575 m (5' 2\")   Wt 117.9 kg (260 lb)   SpO2 97%   BMI 47.55 kg/m        PHYSICAL EXAM:     General: Patient appears well, nontoxic, comfortable  HEENT: Moist mucous membranes,  No head trauma.    Cardiovascular: Normal rate, normal rhythm, no extremity edema.  No appreciable murmur.  Respiratory: No signs of respiratory distress, lungs are clear to auscultation bilaterally with no wheezes rhonchi or rales.  Abdominal: Soft, nontender, nondistended, no palpable masses, no guarding, no rebound  Musculoskeletal: Full range of motion of joints, no deformities appreciated.  No effusions palpated on bilateral knees.  Each knee is 1 pinpoint area of injection.  Neurological: Alert and oriented, grossly neurologically intact.  Psychological: Normal affect and mood.  Integument: No rashes appreciated          RESULTS       Labs Ordered and Resulted from Time of ED Arrival to Time of ED Departure   HCG QUALITATIVE URINE - Normal       Result Value    hCG Urine Qualitative Negative         XR Knee Bilateral 3 Views    (Results Pending)         PROCEDURES:  Procedures:  Procedures       I, Sherine Coon am serving as a scribe to document services personally performed by Marcos Marsh DO, based on my " observations and the provider's statements to me.  I, Marcos Marsh DO, attest that Sherine Shaggy is acting in a scribe capacity, has observed my performance of the services and has documented them in accordance with my direction.    Marcos Marsh DO  Emergency Medicine  [facility] EMERGENCY ROOM       Salma, Marcos Isabel DO  02/08/24 2049

## 2024-02-09 ENCOUNTER — TELEPHONE (OUTPATIENT)
Dept: ENDOCRINOLOGY | Facility: CLINIC | Age: 34
End: 2024-02-09
Payer: COMMERCIAL

## 2024-02-09 NOTE — DISCHARGE INSTRUCTIONS
Your x-rays today were normal with no signs of extra fluid or fracture.  Please take your home medications as prescribed.  Return for any fevers.  Follow-up with your pain clinic and give them a call tomorrow for follow-up

## 2024-02-09 NOTE — PROGRESS NOTES
Patient discharged back to group home by staff member. Knee wrapped in ace wraps. Medications given prior to discharge. Vitals stable on RA.

## 2024-02-09 NOTE — TELEPHONE ENCOUNTER
"  General Call    Contacts         Type Contact Phone/Fax    02/09/2024 02:55 PM CST Phone (Incoming) Nevin Alvarado (Self) [phone] (M)          Reason for Call:  please call pt to discuss issues with \"finding an assessment you need\"?          Could we send this information to you in [facility] or would you prefer to receive a phone call?:   Patient would prefer a phone call   Okay to leave a detailed message?: Yes at Cell number on file:    Telephone Information:   Mobile [phone]       "

## 2024-02-24 ENCOUNTER — HOSPITAL ENCOUNTER (EMERGENCY)
Facility: CLINIC | Age: 34
Discharge: HOME | End: 2024-02-24
Payer: COMMERCIAL

## 2024-02-24 VITALS
SYSTOLIC BLOOD PRESSURE: 146 MMHG | HEIGHT: 62 IN | HEART RATE: 115 BPM | OXYGEN SATURATION: 97 % | RESPIRATION RATE: 26 BRPM | WEIGHT: 260 LBS | TEMPERATURE: 98.5 F | BODY MASS INDEX: 47.84 KG/M2 | DIASTOLIC BLOOD PRESSURE: 79 MMHG

## 2024-02-24 DIAGNOSIS — J10.1 INFLUENZA A: ICD-10-CM

## 2024-02-24 LAB
FLUAV RNA SPEC QL NAA+PROBE: POSITIVE
FLUBV RNA RESP QL NAA+PROBE: NEGATIVE
RSV RNA SPEC NAA+PROBE: NEGATIVE
SARS-COV-2 RNA RESP QL NAA+PROBE: NEGATIVE

## 2024-02-24 PROCEDURE — 258N000003 HC RX IP 258 OP 636

## 2024-02-24 PROCEDURE — 96361 HYDRATE IV INFUSION ADD-ON: CPT

## 2024-02-24 PROCEDURE — 96375 TX/PRO/DX INJ NEW DRUG ADDON: CPT

## 2024-02-24 PROCEDURE — 250N000013 HC RX MED GY IP 250 OP 250 PS 637

## 2024-02-24 PROCEDURE — 96374 THER/PROPH/DIAG INJ IV PUSH: CPT

## 2024-02-24 PROCEDURE — 87637 SARSCOV2&INF A&B&RSV AMP PRB: CPT | Performed by: EMERGENCY MEDICINE

## 2024-02-24 PROCEDURE — 99284 EMERGENCY DEPT VISIT MOD MDM: CPT | Mod: 25

## 2024-02-24 PROCEDURE — 250N000011 HC RX IP 250 OP 636

## 2024-02-24 RX ORDER — DIPHENHYDRAMINE HYDROCHLORIDE 50 MG/ML
25 INJECTION INTRAMUSCULAR; INTRAVENOUS ONCE
Status: COMPLETED | OUTPATIENT
Start: 2024-02-24 | End: 2024-02-24

## 2024-02-24 RX ORDER — ONDANSETRON 2 MG/ML
4 INJECTION INTRAMUSCULAR; INTRAVENOUS ONCE
Status: COMPLETED | OUTPATIENT
Start: 2024-02-24 | End: 2024-02-24

## 2024-02-24 RX ORDER — ONDANSETRON 4 MG/1
4 TABLET, ORALLY DISINTEGRATING ORAL EVERY 8 HOURS PRN
Qty: 10 TABLET | Refills: 0 | Status: SHIPPED | OUTPATIENT
Start: 2024-02-24 | End: 2024-02-27

## 2024-02-24 RX ORDER — METOCLOPRAMIDE HYDROCHLORIDE 5 MG/ML
10 INJECTION INTRAMUSCULAR; INTRAVENOUS ONCE
Status: COMPLETED | OUTPATIENT
Start: 2024-02-24 | End: 2024-02-24

## 2024-02-24 RX ORDER — KETOROLAC TROMETHAMINE 15 MG/ML
15 INJECTION, SOLUTION INTRAMUSCULAR; INTRAVENOUS ONCE
Status: COMPLETED | OUTPATIENT
Start: 2024-02-24 | End: 2024-02-24

## 2024-02-24 RX ORDER — ACETAMINOPHEN 500 MG
1000 TABLET ORAL ONCE
Status: COMPLETED | OUTPATIENT
Start: 2024-02-24 | End: 2024-02-24

## 2024-02-24 RX ADMIN — ONDANSETRON 4 MG: 2 INJECTION INTRAMUSCULAR; INTRAVENOUS at 13:08

## 2024-02-24 RX ADMIN — KETOROLAC TROMETHAMINE 15 MG: 15 INJECTION, SOLUTION INTRAMUSCULAR; INTRAVENOUS at 13:11

## 2024-02-24 RX ADMIN — FAMOTIDINE 20 MG: 10 INJECTION, SOLUTION INTRAVENOUS at 13:11

## 2024-02-24 RX ADMIN — DIPHENHYDRAMINE HYDROCHLORIDE 25 MG: 50 INJECTION INTRAMUSCULAR; INTRAVENOUS at 14:11

## 2024-02-24 RX ADMIN — ACETAMINOPHEN 1000 MG: 500 TABLET ORAL at 13:26

## 2024-02-24 RX ADMIN — SODIUM CHLORIDE 1000 ML: 9 INJECTION, SOLUTION INTRAVENOUS at 13:03

## 2024-02-24 RX ADMIN — METOCLOPRAMIDE HYDROCHLORIDE 10 MG: 5 INJECTION INTRAMUSCULAR; INTRAVENOUS at 14:11

## 2024-02-24 ASSESSMENT — ACTIVITIES OF DAILY LIVING (ADL)
ADLS_ACUITY_SCORE: 42
ADLS_ACUITY_SCORE: 42

## 2024-02-24 ASSESSMENT — COLUMBIA-SUICIDE SEVERITY RATING SCALE - C-SSRS
2. HAVE YOU ACTUALLY HAD ANY THOUGHTS OF KILLING YOURSELF IN THE PAST MONTH?: NO
6. HAVE YOU EVER DONE ANYTHING, STARTED TO DO ANYTHING, OR PREPARED TO DO ANYTHING TO END YOUR LIFE?: NO
1. IN THE PAST MONTH, HAVE YOU WISHED YOU WERE DEAD OR WISHED YOU COULD GO TO SLEEP AND NOT WAKE UP?: NO

## 2024-02-24 NOTE — ED TRIAGE NOTES
Patient presents to ED via EMS, seen at [facility] last night for same, having sx since Wednesday of abdominal pain, body aches, emesis earlier today, low garde fever in triage, pt tearful.  Catherine Casillas RN.......2/24/2024 11:55 AM     Triage Assessment (Adult)       Row Name 02/24/24 1153          Triage Assessment    Airway WDL WDL        Respiratory WDL    Respiratory WDL X;cough        Skin Circulation/Temperature WDL    Skin Circulation/Temperature WDL WDL        Cardiac WDL    Cardiac WDL WDL        Peripheral/Neurovascular WDL    Peripheral Neurovascular WDL WDL        Cognitive/Neuro/Behavioral WDL    Cognitive/Neuro/Behavioral WDL WDL

## 2024-02-24 NOTE — DISCHARGE INSTRUCTIONS
You were seen in the ER and diagnosed with Influenza A.     Rest and drink plenty of fluids.  You may take ibuprofen and/or Tylenol as needed for pain - see dosing information below.  You may take Zofran as needed for nausea. You may take over-the-counter Mucinex or Flonase nasal spray or congestion.    You may use over-the-counter Cepacol lozenges, Throat Coat tea, tea with honey, salt water gargles, as needed for sore throat.     Tylenol (Acetaminophen) Discharge Instructions:  You may take 2 tablets of regular strength, over-the-counter, Tylenol (acetaminophen) every 4-6 hours as needed for pain.  Take no more than 4000 mg of Tylenol in a 24-hour period.      Avoid taking more than 1 acetaminophen-containing product at a time and be aware that many over-the-counter medications contain a combination of acetaminophen and other products.  If you are taking Tylenol in addition to a combination product please keep track of your daily acetaminophen dose to make sure you do not exceed the recommended 4000 mg.  Taking too much acetaminophen can cause permanent damage to your liver.    Ibuprofen/Naproxen Discharge Instructions:  You may take ibuprofen for pain control.  The maximum dose of (ibuprofen is 3200 mg ) in a 24-hour period.    Take this medication with food to prevent stomach irritation.  With long-term use this medication can irritate the stomach causing pain and lead to development of a stomach ulcer.  If you notice stomach pain or vomiting of coffee-ground colored vomit or blood, please be seen by a healthcare provider.  Attempt to use this medication for the shortest time possible.      Follow-up with your primary care provider for reevaluation, especially if symptoms persist.    Return to the emergency department for any new or worsening symptoms including uncontrollable fever/chills, neck stiffness, rash, chest pain, shortness of breath, coughing up blood, or any other concerning symptoms.    Take Care!  -  Elmira Segal PA-C

## 2024-02-24 NOTE — ED PROVIDER NOTES
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     OTHER SURGICAL HISTORY      foreign body anus removal    MS ESOPHAGOGASTRODUODENOSCOPY TRANSORAL DIAGNOSTIC N/A 1/5/2019    Procedure: ESOPHAGOGASTRODUODENOSCOPY (EGD) with foreign body removal using raptor;  Surgeon: Lila Sol MD;  Location: [facility];  Service: Gastroenterology    MS ESOPHAGOGASTRODUODENOSCOPY TRANSORAL DIAGNOSTIC N/A 1/25/2019    Procedure:  ESOPHAGOGASTRODUODENOSCOPY (EGD) removal of foreign body;  Surgeon: Binu Vigil MD;  Location: [facility] OR;  Service: Gastroenterology    AZ ESOPHAGOGASTRODUODENOSCOPY TRANSORAL DIAGNOSTIC N/A 1/31/2019    Procedure: ESOPHAGOGASTRODUODENOSCOPY (EGD);  Surgeon: Siddharth Spears MD;  Location: [facility] OR;  Service: Gastroenterology    AZ ESOPHAGOGASTRODUODENOSCOPY TRANSORAL DIAGNOSTIC N/A 8/17/2019    Procedure: ESOPHAGOGASTRODUODENOSCOPY (EGD) with foreign body removal;  Surgeon: Darek Lucero MD;  Location: [facility];  Service: Gastroenterology    AZ ESOPHAGOGASTRODUODENOSCOPY TRANSORAL DIAGNOSTIC N/A 9/29/2019    Procedure: ESOPHAGOGASTRODUODENOSCOPY (EGD) with foreign body removal;  Surgeon: Bailey Arnold MD;  Location: [facility];  Service: Gastroenterology    AZ ESOPHAGOGASTRODUODENOSCOPY TRANSORAL DIAGNOSTIC N/A 10/3/2019    Procedure: ESOPHAGOGASTRODUODENOSCOPY (EGD), REMOVAL OF FOREIGN BODY;  Surgeon: Chris Lira MD;  Location: [facility] OR;  Service: Gastroenterology    AZ ESOPHAGOGASTRODUODENOSCOPY TRANSORAL DIAGNOSTIC N/A 10/6/2019    Procedure: ESOPHAGOGASTRODUODENOSCOPY (EGD) with attempted foreign body removal;  Surgeon: Felipe Connolly MD;  Location: [facility];  Service: Gastroenterology    AZ ESOPHAGOGASTRODUODENOSCOPY TRANSORAL DIAGNOSTIC N/A 12/15/2019    Procedure: ESOPHAGOGASTRODUODENOSCOPY (EGD) with foreign body removal;  Surgeon: Jeffy Zuñiga MD;  Location: [facility];  Service: Gastroenterology    AZ ESOPHAGOGASTRODUODENOSCOPY TRANSORAL DIAGNOSTIC N/A 12/17/2019    Procedure: ESOPHAGOGASTRODUODENOSCOPY (EGD) with attempted foreign body removal;  Surgeon: Felipe Connolly MD;  Location: [facility];  Service: Gastroenterology    AZ ESOPHAGOGASTRODUODENOSCOPY TRANSORAL DIAGNOSTIC N/A 12/21/2019    Procedure: ESOPHAGOGASTRODUODENOSCOPY (EGD) FOR FROEIGN BODY REMOVAL;  Surgeon: Binu Vigil MD;   Location: [facility] OR;  Service: Gastroenterology    RI ESOPHAGOGASTRODUODENOSCOPY TRANSORAL DIAGNOSTIC N/A 7/22/2020    Procedure: ESOPHAGOGASTRODUODENOSCOPY (EGD);  Surgeon: Bailey Arnold MD;  Location: [facility] OR;  Service: Gastroenterology    RI ESOPHAGOGASTRODUODENOSCOPY TRANSORAL DIAGNOSTIC N/A 8/14/2020    Procedure: ESOPHAGOGASTRODUODENOSCOPY (EGD) FOREIGN BODY REMOVAL;  Surgeon: Jeffy Zuñiga MD;  Location: [facility] OR;  Service: Gastroenterology    RI ESOPHAGOGASTRODUODENOSCOPY TRANSORAL DIAGNOSTIC N/A 2/25/2021    Procedure: ESOPHAGOGASTRODUODENOSCOPY (EGD) with foreign body reoval;  Surgeon: Bird Sethi MD;  Location: [facility];  Service: Gastroenterology    RI ESOPHAGOGASTRODUODENOSCOPY TRANSORAL DIAGNOSTIC N/A 4/19/2021    Procedure: ESOPHAGOGASTRODUODENOSCOPY (EGD);  Surgeon: Libia Rose MD;  Location: [facility];  Service: Gastroenterology    RI SURG DIAGNOSTIC EXAM, ANORECTAL N/A 2/5/2020    Procedure: EXAM UNDER ANESTHESIA, Flexible Sigmoidoscopy, Retrieval of Foreign Body;  Surgeon: Sasha Ivan MD;  Location: [facility];  Service: General    RELEASE CARPAL TUNNEL Bilateral     RELEASE CARPAL TUNNEL Bilateral     REMOVAL, FOREIGN BODY, RECTUM N/A 7/21/2021    Procedure: MANUAL RETREIVALOF FOREIGN OBJECT- RECTUM.;  Surgeon: Aleksandra Gerber MD;  Location: [facility]    SIGMOIDOSCOPY FLEXIBLE N/A 1/10/2017    Procedure: SIGMOIDOSCOPY FLEXIBLE;  Surgeon: Annmarie Haynes MD;  Location: [facility] OR    SIGMOIDOSCOPY FLEXIBLE N/A 5/8/2018    Procedure: SIGMOIDOSCOPY FLEXIBLE;  flex sig with foreign body removal using snare and rattooth forcep;  Surgeon: Soham Cano MD;  Location: [facility]    SIGMOIDOSCOPY FLEXIBLE N/A 2/20/2019    Procedure: Exam under anesthesia Colonoscopy with attempted  removal of rectal foreign body;  Surgeon: Estrada Chávez MD;  Location:  OR           CURRENT MEDICATIONS:    ondansetron (ZOFRAN  ODT) 4 MG ODT tab  acetaminophen (TYLENOL) 500 MG tablet  albuterol (PROAIR HFA/PROVENTIL HFA/VENTOLIN HFA) 108 (90 Base) MCG/ACT inhaler  albuterol (PROVENTIL) (2.5 MG/3ML) 0.083% neb solution  BANOPHEN 2-0.1 % external cream  benzonatate (TESSALON) 100 MG capsule  brexpiprazole (REXULTI) 1 MG tablet  cetirizine (ZYRTEC) 10 MG tablet  Cholecalciferol (D3 HIGH POTENCY) 25 MCG (1000 UT) CAPS  clonazePAM (KLONOPIN) 0.5 MG tablet  cyclobenzaprine (FLEXERIL) 10 MG tablet  ferrous sulfate (FEROSUL) 325 (65 Fe) MG tablet  fluocinonide (LIDEX) 0.05 % external cream  furosemide (LASIX) 20 MG tablet  hydroxychloroquine (PLAQUENIL) 200 MG tablet  metFORMIN (GLUCOPHAGE XR) 500 MG 24 hr tablet  montelukast (SINGULAIR) 10 MG tablet  nabumetone (RELAFEN) 750 MG tablet  norethindrone (AYGESTIN) 5 MG tablet  omeprazole (PRILOSEC) 40 MG DR capsule  ondansetron (ZOFRAN-ODT) 4 MG ODT tab  pregabalin (LYRICA) 100 MG capsule  pregabalin (LYRICA) 100 MG capsule  pseudoePHEDrine (SUDAFED) 60 MG tablet  Respiratory Therapy Supplies (NEBULIZER) BRENDAN  Semaglutide-Weight Management (WEGOVY) 1 MG/0.5ML pen  SUMAtriptan (IMITREX) 25 MG tablet  valACYclovir (VALTREX) 1000 mg tablet        ALLERGIES:  Allergies   Allergen Reactions    Amoxicillin-Pot Clavulanate Other (See Comments), Swelling and Rash     PN: facial swelling, left side. Also had cortisone injection the same day as she started the Augmentin.  Noted in downtime recovery of chart.    PN: facial swelling, left side. Also had cortisone injection the same day as she started the Augmentin.; HUT Comment: PN: facial swelling, left side. Also had cortisone injection the same day as she started the Augmentin.Noted in downtime recovery of chart.; HUT Reaction: Rash; HUT Reaction: Unknown; HUT Reaction Type: Allergy; HUT Severity: Med; HUT Noted: [id number]  PN: facial swelling, left side. Also had cortisone injection the same day as she started the Augmentin.  Other reaction(s): *Unknown  PN:  facial swelling, left side. Also had cortisone injection the same day as she started the Augmentin.  Noted in downtime recovery of chart.  PN: facial swelling, left side. Also had cortisone injection the same day as she started the Augmentin.  Other reaction(s): Facial swelling  Other reaction(s): Facial swelling    Hydrocodone Nausea and Vomiting and GI Disturbance     vomiting for days, PN: vomiting for days; HUT Comment: vomiting for days; HUT Reaction: Gastrointestinal; HUT Reaction: Nausea And Vomiting; HUT Reaction Type: Intolerance; HUT Severity: Med; HUT Noted: [id number]  vomiting for days    Other reaction(s): Rash    Hydrocodone-Acetaminophen Nausea and Vomiting and Rash     Update on 12/12  Pt says she can take tylenol just not the hydrocodone.   Other reaction(s): Rash      Influenza Vaccines Other (See Comments) and Nausea and Vomiting     HUT Reaction: Nausea And Vomiting; HUT Reaction Type: Intolerance; HUT Severity: Low; HUT Noted: [id number]    Latex Rash     HUT Reaction: Rash; HUT Reaction Type: Allergy; HUT Severity: Low; HUT Noted: [id number]  Other reaction(s): Rash      Oseltamivir Hives     med stopped, PN: med stopped  med stopped, PN: med stopped; HUT Comment: med stopped, PN: med stopped; HUT Reaction: Hives; HUT Reaction Type: Allergy; HUT Severity: Med; HUT Noted: [id number]    Penicillins Anaphylaxis     HUT Reaction: Anaphylaxis; HUT Reaction Type: Allergy; HUT Severity: High; HUT Noted: [id number]    Vancomycin Itching, Swelling and Rash     Other reaction(s): Redness  Flushed face, very itchy; HUT Comment: Flushed face, very itchy; HUT Reaction: Angioedema; HUT Reaction: Redness; HUT Severity: Med; HUT Noted: [id number]    facial    Blood-Group Specific Substance Other (See Comments)     Patient has an anti-Cw and non-specific antibodies. Blood product orders may be delayed. Draw one red top and two purple top tubes for all type/screen/crossmatch orders.  Patient has anti-Cw, anti-K (Angella),  Warm auto and nonspecific antibodies. Blood products may be delayed. Draw patient 24 hours prior to transfusion. Draw one red top and two purple top tubes for all type and screen orders.    Clavulanic Acid Angioedema    Fentanyl Itching    Haemophilus B Polysaccharide Vaccine Nausea and Vomiting    Naltrexone Other (See Comments)     Reaction(s): Vivid dreams.    Other Drug Allergy (See Comments)      See original file MRN [id number]. Files are marked for merge    Oxycodone Swelling    Adhesive Tape Rash     Silicone type  Silicone type    Other reaction(s): adhesive allergy  Other reaction(s): adhesive allergy  Silicone type    Other reaction(s): adhesive allergy      Band-Aid Anti-Itch      Other reaction(s): adhesive allergy    Cephalosporins Rash    Lamotrigine Rash     Possibly associated with Lamictal.   HUT Comment: Possibly associated with Lamictal. ; HUT Reaction: Rash; HUT Reaction Type: Allergy; HUT Severity: Low; HUT Noted: [id number]    No Clinical Screening - See Comments Rash and Other (See Comments)     Silicone type  Silicone type  See original file MRN [id number]. Files are marked for merge  History of swallowing sharp metallic objects. She should not be prescribed lancets due to posed risk of swallowing.        FAMILY HISTORY:  Family History   Problem Relation Age of Onset    Diabetes Type 2  Maternal Grandmother     Diabetes Type 2  Paternal Grandmother     Breast Cancer Paternal Grandmother     Cerebrovascular Disease Father 53    Myocardial Infarction No family hx of     Coronary Artery Disease Early Onset No family hx of     Ovarian Cancer No family hx of     Colon Cancer No family hx of     Depression Mother     Anxiety Disorder Mother        SOCIAL HISTORY:   Social History     Socioeconomic History    Marital status: Single   Occupational History    Occupation: On disability   Tobacco Use    Smoking status: Never    Smokeless tobacco: Never   Substance and Sexual Activity    Alcohol use: No  "    Alcohol/week: 0.0 standard drinks of alcohol    Drug use: No    Sexual activity: Not Currently     Partners: Male     Birth control/protection: I.U.D.     Comment: IUD - Mirena - placed July, 2015   Social History Narrative    Single.    Living in independent living portion of [employer].    On disability.    No regular exercise.        VITALS:  BP (!) 146/79   Pulse 115   Temp 98.5  F (36.9  C) (Oral)   Resp 26   Ht 1.575 m (5' 2\")   Wt 117.9 kg (260 lb)   LMP 07/12/2023   SpO2 97%   BMI 47.55 kg/m      PHYSICAL EXAM    Constitutional:  Alert, tearful. Cooperative.  EYES: Conjunctivae clear.   HENT:  Atraumatic, normocephalic. External and internal ears normal with normal TM without erythema or perforation. Full ROM neck without pain. No nuchal rigidity. Trachea midline with normal phonation. Tolerates secretions.   Respiratory:  Respirations even, unlabored, in no acute respiratory distress. Lungs clear to auscultation bilaterally without wheeze, rhonchi, or rales. No cough. Speaks in full sentences easily.  Cardiovascular:  Tachycardic rate, regular rhythm, good peripheral perfusion. No peripheral edema. No chest wall tenderness.  GI: Soft, flat, non-distended. Bowel sounds normal. LLQ tenderness to palpation. No guarding, rebound, or other peritoneal signs. No CVA tenderness to percussion.  Musculoskeletal:  No edema. No cyanosis. Range of motion major extremities intact.    Integument: Warm, Dry. No rash to visualized skin.   Neurologic:  Alert & oriented. No focal deficits noted. Midline generalized spinal tenderness to palpation without overlying erythema, warmth, purulence, fluctuance, edema, crepitus, or obvious step off. Symmetrically absent patellar reflexes. Sensation intact. Motor intact. 5/5 strength upper and lower extremities.   Psych: Appears anxious.    LAB:  All pertinent labs reviewed and interpreted.  Results for orders placed or performed during the hospital encounter of " 02/24/24   Symptomatic Influenza A/B, RSV, & SARS-CoV2 PCR (COVID-19) Nasopharyngeal    Specimen: Nasopharyngeal; Swab   Result Value Ref Range    Influenza A PCR Positive (A) Negative    Influenza B PCR Negative Negative    RSV PCR Negative Negative    SARS CoV2 PCR Negative Negative       RADIOLOGY:  Reviewed all pertinent imaging. Please see official radiology report.  No orders to display     Elmira Segal PA-C  [facility] EMERGENCY ROOM  [address] [place] [id number] [phone]      Elmira Segal PA-C  02/24/24 1419

## 2024-03-01 ENCOUNTER — TRANSFERRED RECORDS (OUTPATIENT)
Dept: MULTI SPECIALTY CLINIC | Facility: CLINIC | Age: 34
End: 2024-03-01

## 2024-03-01 LAB — RETINOPATHY: NORMAL

## 2024-03-03 ENCOUNTER — APPOINTMENT (OUTPATIENT)
Dept: ULTRASOUND IMAGING | Facility: CLINIC | Age: 34
End: 2024-03-03
Attending: EMERGENCY MEDICINE
Payer: COMMERCIAL

## 2024-03-03 ENCOUNTER — APPOINTMENT (OUTPATIENT)
Dept: RADIOLOGY | Facility: CLINIC | Age: 34
End: 2024-03-03
Attending: EMERGENCY MEDICINE
Payer: COMMERCIAL

## 2024-03-03 ENCOUNTER — HOSPITAL ENCOUNTER (EMERGENCY)
Facility: CLINIC | Age: 34
Discharge: HOME | End: 2024-03-03
Attending: EMERGENCY MEDICINE | Admitting: EMERGENCY MEDICINE
Payer: COMMERCIAL

## 2024-03-03 VITALS
OXYGEN SATURATION: 96 % | HEART RATE: 97 BPM | WEIGHT: 259 LBS | TEMPERATURE: 97.4 F | SYSTOLIC BLOOD PRESSURE: 128 MMHG | DIASTOLIC BLOOD PRESSURE: 74 MMHG | HEIGHT: 62 IN | RESPIRATION RATE: 18 BRPM | BODY MASS INDEX: 47.66 KG/M2

## 2024-03-03 DIAGNOSIS — R10.30 LOWER ABDOMINAL PAIN: ICD-10-CM

## 2024-03-03 LAB
ALBUMIN UR-MCNC: 10 MG/DL
ANION GAP SERPL CALCULATED.3IONS-SCNC: 12 MMOL/L (ref 7–15)
APPEARANCE UR: CLEAR
BACTERIA #/AREA URNS HPF: ABNORMAL /HPF
BASOPHILS # BLD AUTO: 0 10E3/UL (ref 0–0.2)
BASOPHILS NFR BLD AUTO: 0 %
BILIRUB UR QL STRIP: NEGATIVE
BUN SERPL-MCNC: 6 MG/DL (ref 6–20)
CALCIUM SERPL-MCNC: 9.6 MG/DL (ref 8.6–10)
CHLORIDE SERPL-SCNC: 106 MMOL/L (ref 98–107)
COLOR UR AUTO: ABNORMAL
CREAT SERPL-MCNC: 0.58 MG/DL (ref 0.51–0.95)
DEPRECATED HCO3 PLAS-SCNC: 23 MMOL/L (ref 22–29)
EGFRCR SERPLBLD CKD-EPI 2021: >90 ML/MIN/1.73M2
EOSINOPHIL # BLD AUTO: 0.2 10E3/UL (ref 0–0.7)
EOSINOPHIL NFR BLD AUTO: 2 %
ERYTHROCYTE [DISTWIDTH] IN BLOOD BY AUTOMATED COUNT: 13.3 % (ref 10–15)
GLUCOSE SERPL-MCNC: 97 MG/DL (ref 70–99)
GLUCOSE UR STRIP-MCNC: NEGATIVE MG/DL
HCT VFR BLD AUTO: 41.6 % (ref 35–47)
HGB BLD-MCNC: 14.2 G/DL (ref 11.7–15.7)
HGB UR QL STRIP: NEGATIVE
HOLD SPECIMEN: NORMAL
IMM GRANULOCYTES # BLD: 0 10E3/UL
IMM GRANULOCYTES NFR BLD: 0 %
KETONES UR STRIP-MCNC: NEGATIVE MG/DL
LEUKOCYTE ESTERASE UR QL STRIP: NEGATIVE
LYMPHOCYTES # BLD AUTO: 2.2 10E3/UL (ref 0–5.3)
LYMPHOCYTES NFR BLD AUTO: 32 %
MCH RBC QN AUTO: 30.3 PG (ref 26.5–33)
MCHC RBC AUTO-ENTMCNC: 34.1 G/DL (ref 31.5–36.5)
MCV RBC AUTO: 89 FL (ref 78–100)
MONOCYTES # BLD AUTO: 0.5 10E3/UL (ref 0–1.3)
MONOCYTES NFR BLD AUTO: 7 %
MUCOUS THREADS #/AREA URNS LPF: PRESENT /LPF
NEUTROPHILS # BLD AUTO: 4.1 10E3/UL (ref 1.6–8.3)
NEUTROPHILS NFR BLD AUTO: 59 %
NITRATE UR QL: NEGATIVE
NRBC # BLD AUTO: 0 10E3/UL
NRBC BLD AUTO-RTO: 0 /100
PH UR STRIP: 7 [PH] (ref 5–7)
PLATELET # BLD AUTO: 308 10E3/UL (ref 150–450)
POTASSIUM SERPL-SCNC: 4.1 MMOL/L (ref 3.4–5.3)
RBC # BLD AUTO: 4.69 10E6/UL (ref 3.8–5.2)
RBC URINE: <1 /HPF
SODIUM SERPL-SCNC: 141 MMOL/L (ref 135–145)
SP GR UR STRIP: 1.02 (ref 1–1.03)
SQUAMOUS EPITHELIAL: <1 /HPF
UROBILINOGEN UR STRIP-MCNC: <2 MG/DL
WBC # BLD AUTO: 7 10E3/UL (ref 4–11)
WBC URINE: 1 /HPF

## 2024-03-03 PROCEDURE — 80048 BASIC METABOLIC PNL TOTAL CA: CPT | Performed by: EMERGENCY MEDICINE

## 2024-03-03 PROCEDURE — 99285 EMERGENCY DEPT VISIT HI MDM: CPT | Mod: 25

## 2024-03-03 PROCEDURE — 76856 US EXAM PELVIC COMPLETE: CPT

## 2024-03-03 PROCEDURE — 96372 THER/PROPH/DIAG INJ SC/IM: CPT | Performed by: EMERGENCY MEDICINE

## 2024-03-03 PROCEDURE — 85025 COMPLETE CBC W/AUTO DIFF WBC: CPT | Performed by: EMERGENCY MEDICINE

## 2024-03-03 PROCEDURE — 81001 URINALYSIS AUTO W/SCOPE: CPT | Performed by: EMERGENCY MEDICINE

## 2024-03-03 PROCEDURE — 250N000013 HC RX MED GY IP 250 OP 250 PS 637: Performed by: EMERGENCY MEDICINE

## 2024-03-03 PROCEDURE — 36415 COLL VENOUS BLD VENIPUNCTURE: CPT | Performed by: EMERGENCY MEDICINE

## 2024-03-03 PROCEDURE — 76770 US EXAM ABDO BACK WALL COMP: CPT

## 2024-03-03 PROCEDURE — 76830 TRANSVAGINAL US NON-OB: CPT

## 2024-03-03 PROCEDURE — 250N000011 HC RX IP 250 OP 636: Performed by: EMERGENCY MEDICINE

## 2024-03-03 PROCEDURE — 74019 RADEX ABDOMEN 2 VIEWS: CPT

## 2024-03-03 RX ORDER — ONDANSETRON 4 MG/1
4 TABLET, ORALLY DISINTEGRATING ORAL ONCE
Status: COMPLETED | OUTPATIENT
Start: 2024-03-03 | End: 2024-03-03

## 2024-03-03 RX ORDER — KETOROLAC TROMETHAMINE 30 MG/ML
30 INJECTION, SOLUTION INTRAMUSCULAR; INTRAVENOUS ONCE
Status: COMPLETED | OUTPATIENT
Start: 2024-03-03 | End: 2024-03-03

## 2024-03-03 RX ORDER — ACETAMINOPHEN 325 MG/1
975 TABLET ORAL ONCE
Status: COMPLETED | OUTPATIENT
Start: 2024-03-03 | End: 2024-03-03

## 2024-03-03 RX ADMIN — KETOROLAC TROMETHAMINE 30 MG: 30 INJECTION, SOLUTION INTRAMUSCULAR at 19:30

## 2024-03-03 RX ADMIN — ACETAMINOPHEN 975 MG: 325 TABLET ORAL at 20:03

## 2024-03-03 RX ADMIN — ONDANSETRON 4 MG: 4 TABLET, ORALLY DISINTEGRATING ORAL at 20:04

## 2024-03-03 ASSESSMENT — ACTIVITIES OF DAILY LIVING (ADL)
ADLS_ACUITY_SCORE: 42

## 2024-03-03 ASSESSMENT — COLUMBIA-SUICIDE SEVERITY RATING SCALE - C-SSRS
2. HAVE YOU ACTUALLY HAD ANY THOUGHTS OF KILLING YOURSELF IN THE PAST MONTH?: NO
1. IN THE PAST MONTH, HAVE YOU WISHED YOU WERE DEAD OR WISHED YOU COULD GO TO SLEEP AND NOT WAKE UP?: NO
6. HAVE YOU EVER DONE ANYTHING, STARTED TO DO ANYTHING, OR PREPARED TO DO ANYTHING TO END YOUR LIFE?: NO

## 2024-03-03 NOTE — ED PROVIDER NOTES
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 4/19/2021    Procedure: ESOPHAGOGASTRODUODENOSCOPY (EGD);  Surgeon: Libia Rose MD;  [place] [facility] [place]: [place]ology    ME SURG DIAGNOSTIC EXAM, ANORECTAL N/A 2/5/2020    Procedure: EXAM UNDER ANESTHESIA, Flexible Sigmoidoscopy, Retrieval of Foreign Body;  Surgeon: Sasha Ivan MD;   Location: [facility] OR;  Service: General    RELEASE CARPAL TUNNEL Bilateral     RELEASE CARPAL TUNNEL Bilateral     REMOVAL, FOREIGN BODY, RECTUM N/A 7/21/2021    Procedure: MANUAL RETREIVALOF FOREIGN OBJECT- RECTUM.;  Surgeon: Aleksandra Gerber MD;  Location: [facility]    SIGMOIDOSCOPY FLEXIBLE N/A 1/10/2017    Procedure: SIGMOIDOSCOPY FLEXIBLE;  Surgeon: Annmarie Haynes MD;  Location: [facility] OR    SIGMOIDOSCOPY FLEXIBLE N/A 5/8/2018    Procedure: SIGMOIDOSCOPY FLEXIBLE;  flex sig with foreign body removal using snare and rattooth forcep;  Surgeon: Soham Cano MD;  Location:  GI    SIGMOIDOSCOPY FLEXIBLE N/A 2/20/2019    Procedure: Exam under anesthesia Colonoscopy with attempted  removal of rectal foreign body;  Surgeon: Estrada Chávez MD;  Location: [facility] OR           CURRENT MEDICATIONS:    acetaminophen (TYLENOL) 500 MG tablet  albuterol (PROAIR HFA/PROVENTIL HFA/VENTOLIN HFA) 108 (90 Base) MCG/ACT inhaler  albuterol (PROVENTIL) (2.5 MG/3ML) 0.083% neb solution  BANOPHEN 2-0.1 % external cream  benzonatate (TESSALON) 100 MG capsule  brexpiprazole (REXULTI) 1 MG tablet  cetirizine (ZYRTEC) 10 MG tablet  Cholecalciferol (D3 HIGH POTENCY) 25 MCG (1000 UT) CAPS  clonazePAM (KLONOPIN) 0.5 MG tablet  cyclobenzaprine (FLEXERIL) 10 MG tablet  ferrous sulfate (FEROSUL) 325 (65 Fe) MG tablet  fluocinonide (LIDEX) 0.05 % external cream  furosemide (LASIX) 20 MG tablet  hydroxychloroquine (PLAQUENIL) 200 MG tablet  metFORMIN (GLUCOPHAGE XR) 500 MG 24 hr tablet  montelukast (SINGULAIR) 10 MG tablet  nabumetone (RELAFEN) 750 MG tablet  norethindrone (AYGESTIN) 5 MG tablet  omeprazole (PRILOSEC) 40 MG DR capsule  ondansetron (ZOFRAN-ODT) 4 MG ODT tab  pregabalin (LYRICA) 100 MG capsule  pregabalin (LYRICA) 100 MG capsule  pseudoePHEDrine (SUDAFED) 60 MG tablet  Respiratory Therapy Supplies (NEBULIZER) BRENDAN  Semaglutide-Weight Management (WEGOVY) 1 MG/0.5ML pen  SUMAtriptan (IMITREX) 25 MG  tablet  valACYclovir (VALTREX) 1000 mg tablet        ALLERGIES:  Allergies   Allergen Reactions    Amoxicillin-Pot Clavulanate Other (See Comments), Swelling and Rash     PN: facial swelling, left side. Also had cortisone injection the same day as she started the Augmentin.  Noted in downtime recovery of chart.    PN: facial swelling, left side. Also had cortisone injection the same day as she started the Augmentin.; HUT Comment: PN: facial swelling, left side. Also had cortisone injection the same day as she started the Augmentin.Noted in downtime recovery of chart.; HUT Reaction: Rash; HUT Reaction: Unknown; HUT Reaction Type: Allergy; HUT Severity: Med; HUT Noted: [id number]  PN: facial swelling, left side. Also had cortisone injection the same day as she started the Augmentin.  Other reaction(s): *Unknown  PN: facial swelling, left side. Also had cortisone injection the same day as she started the Augmentin.  Noted in downtime recovery of chart.  PN: facial swelling, left side. Also had cortisone injection the same day as she started the Augmentin.  Other reaction(s): Facial swelling  Other reaction(s): Facial swelling    Hydrocodone Nausea and Vomiting and GI Disturbance     vomiting for days, PN: vomiting for days; HUT Comment: vomiting for days; HUT Reaction: Gastrointestinal; HUT Reaction: Nausea And Vomiting; HUT Reaction Type: Intolerance; HUT Severity: Med; HUT Noted: [id number]  vomiting for days    Other reaction(s): Rash    Hydrocodone-Acetaminophen Nausea and Vomiting and Rash     Update on 12/12  Pt says she can take tylenol just not the hydrocodone.   Other reaction(s): Rash      Influenza Vaccines Other (See Comments) and Nausea and Vomiting     HUT Reaction: Nausea And Vomiting; HUT Reaction Type: Intolerance; HUT Severity: Low; HUT Noted: [id number]    Latex Rash     HUT Reaction: Rash; HUT Reaction Type: Allergy; HUT Severity: Low; HUT Noted: [id number]  Other reaction(s): Rash      Oseltamivir Hives      med stopped, PN: med stopped  med stopped, PN: med stopped; HUT Comment: med stopped, PN: med stopped; HUT Reaction: Hives; HUT Reaction Type: Allergy; HUT Severity: Med; HUT Noted: [id number]    Penicillins Anaphylaxis     HUT Reaction: Anaphylaxis; HUT Reaction Type: Allergy; HUT Severity: High; HUT Noted: [id number]    Vancomycin Itching, Swelling and Rash     Other reaction(s): Redness  Flushed face, very itchy; HUT Comment: Flushed face, very itchy; HUT Reaction: Angioedema; HUT Reaction: Redness; HUT Severity: Med; HUT Noted: [id number]    facial    Blood-Group Specific Substance Other (See Comments)     Patient has an anti-Cw and non-specific antibodies. Blood product orders may be delayed. Draw one red top and two purple top tubes for all type/screen/crossmatch orders.  Patient has anti-Cw, anti-K ([place]), Warm auto and nonspecific antibodies. Blood products may be delayed. Draw patient 24 hours prior to transfusion. Draw one red top and two purple top tubes for all type and screen orders.    Clavulanic Acid Angioedema    Fentanyl Itching    Haemophilus B Polysaccharide Vaccine Nausea and Vomiting    Naltrexone Other (See Comments)     Reaction(s): Vivid dreams.    Other Drug Allergy (See Comments)      See original file MRN [id number]. Files are marked for merge    Oxycodone Swelling    Adhesive Tape Rash     Silicone type  Silicone type    Other reaction(s): adhesive allergy  Other reaction(s): adhesive allergy  Silicone type    Other reaction(s): adhesive allergy      Band-Aid Anti-Itch      Other reaction(s): adhesive allergy    Cephalosporins Rash    Lamotrigine Rash     Possibly associated with Lamictal.   HUT Comment: Possibly associated with Lamictal. ; HUT Reaction: Rash; HUT Reaction Type: Allergy; HUT Severity: Low; HUT Noted: [id number]    No Clinical Screening - See Comments Rash and Other (See Comments)     Silicone type  Silicone type  See original file MRN [id number]. Files are marked for  "merge  History of swallowing sharp metallic objects. She should not be prescribed lancets due to posed risk of swallowing.        FAMILY HISTORY:  Family History   Problem Relation Age of Onset    Diabetes Type 2  Maternal Grandmother     Diabetes Type 2  Paternal Grandmother     Breast Cancer Paternal Grandmother     Cerebrovascular Disease Father 53    Myocardial Infarction No family hx of     Coronary Artery Disease Early Onset No family hx of     Ovarian Cancer No family hx of     Colon Cancer No family hx of     Depression Mother     Anxiety Disorder Mother        SOCIAL HISTORY:   Social History     Socioeconomic History    Marital status: Single   Occupational History    Occupation: On disability   Tobacco Use    Smoking status: Never    Smokeless tobacco: Never   Substance and Sexual Activity    Alcohol use: No     Alcohol/week: 0.0 standard drinks of alcohol    Drug use: No    Sexual activity: Not Currently     Partners: Male     Birth control/protection: I.U.D.     Comment: IUD - Mirena - placed July, 2015   Social History Narrative    Single.    Living in independent living portion of [employer].    On disability.    No regular exercise.        VITALS:  /74   Pulse 97   Temp 97.4  F (36.3  C) (Temporal)   Resp 18   Ht 1.575 m (5' 2\")   Wt 117.5 kg (259 lb)   LMP 07/12/2023   SpO2 96%   BMI 47.37 kg/m      PHYSICAL EXAM    Constitutional: Well developed, Well nourished, NAD  HENT: Normocephalic, Atraumatic, Bilateral external ears normal, Oropharynx normal, mucous membranes moist, Nose normal.   Neck- Normal range of motion, No tenderness, Supple, No stridor.  Eyes: PERRL, EOMI, Conjunctiva normal, No discharge.   Respiratory: Normal breath sounds, No respiratory distress  Cardiovascular: Normal heart rate, Regular rhythm  GI: Bowel sounds normal, Soft, No tenderness,   Musculoskeletal: No edema. Good range of motion in all major joints. No tenderness to palpation or major " deformities noted.   Integument: Warm, Dry, No erythema, No rash  Neurologic: Alert & oriented x 3, Normal motor function, Normal sensory function, No focal deficits noted. Normal gait.   Psychiatric: Affect normal, Judgment normal, Mood normal.      LAB:  All pertinent labs reviewed and interpreted.  Results for orders placed or performed during the hospital encounter of 03/03/24   US Renal Complete Non-Vascular    Impression    IMPRESSION:  1.  No hydronephrosis identified.   US Pelvis Cmplt w Transvag & Doppler LmtPel Duplex Limited    Impression    IMPRESSION:    1.  Right ovary is obscured by bowel gas and the left is only partially seen due to bowel gas.  What is seen appears normal on grey scale.   2.  There were both normal on the recent CT and MRI without evidence of  ovarian cysts or masses.  3.  Uterus is unremarkable.  4.  No abnormalities are seen to explain pain.         Abdomen XR, 2 vw, flat and upright    Impression    IMPRESSION: Scattered gas and stool seen throughout nondistended large and small bowel. No dilated loops of bowel are identified to suggest obstruction. No abnormal soft tissue calcification.    UA with Microscopic reflex to Culture    Specimen: Urine, Midstream   Result Value Ref Range    Color Urine Light Yellow Colorless, Straw, Light Yellow, Yellow    Appearance Urine Clear Clear    Glucose Urine Negative Negative mg/dL    Bilirubin Urine Negative Negative    Ketones Urine Negative Negative mg/dL    Specific Gravity Urine 1.022 1.001 - 1.030    Blood Urine Negative Negative    pH Urine 7.0 5.0 - 7.0    Protein Albumin Urine 10 (A) Negative mg/dL    Urobilinogen Urine <2.0 <2.0 mg/dL    Nitrite Urine Negative Negative    Leukocyte Esterase Urine Negative Negative    Bacteria Urine Few (A) None Seen /HPF    Mucus Urine Present (A) None Seen /LPF    RBC Urine <1 <=2 /HPF    WBC Urine 1 <=5 /HPF    Squamous Epithelials Urine <1 <=1 /HPF   Basic metabolic panel   Result Value Ref Range     Sodium 141 135 - 145 mmol/L    Potassium 4.1 3.4 - 5.3 mmol/L    Chloride 106 98 - 107 mmol/L    Carbon Dioxide (CO2) 23 22 - 29 mmol/L    Anion Gap 12 7 - 15 mmol/L    Urea Nitrogen 6.0 6.0 - 20.0 mg/dL    Creatinine 0.58 0.51 - 0.95 mg/dL    GFR Estimate >90 >60 mL/min/1.73m2    Calcium 9.6 8.6 - 10.0 mg/dL    Glucose 97 70 - 99 mg/dL   CBC with platelets and differential   Result Value Ref Range    WBC Count 7.0 4.0 - 11.0 10e3/uL    RBC Count 4.69 3.80 - 5.20 10e6/uL    Hemoglobin 14.2 11.7 - 15.7 g/dL    Hematocrit 41.6 35.0 - 47.0 %    MCV 89 78 - 100 fL    MCH 30.3 26.5 - 33.0 pg    MCHC 34.1 31.5 - 36.5 g/dL    RDW 13.3 10.0 - 15.0 %    Platelet Count 308 150 - 450 10e3/uL    % Neutrophils 59 %    % Lymphocytes 32 %    % Monocytes 7 %    % Eosinophils 2 %    % Basophils 0 %    % Immature Granulocytes 0 %    NRBCs per 100 WBC 0 <1 /100    Absolute Neutrophils 4.1 1.6 - 8.3 10e3/uL    Absolute Lymphocytes 2.2 0.0 - 5.3 10e3/uL    Absolute Monocytes 0.5 0.0 - 1.3 10e3/uL    Absolute Eosinophils 0.2 0.0 - 0.7 10e3/uL    Absolute Basophils 0.0 0.0 - 0.2 10e3/uL    Absolute Immature Granulocytes 0.0 <=0.4 10e3/uL    Absolute NRBCs 0.0 10e3/uL   Extra Red Top Tube   Result Value Ref Range    Hold Specimen [facility]        RADIOLOGY:  Reviewed all pertinent imaging. Please see official radiology report.  Abdomen XR, 2 vw, flat and upright   Final Result   IMPRESSION: Scattered gas and stool seen throughout nondistended large and small bowel. No dilated loops of bowel are identified to suggest obstruction. No abnormal soft tissue calcification.       US Renal Complete Non-Vascular   Final Result   IMPRESSION:   1.  No hydronephrosis identified.      US Pelvis Cmplt w Transvag & Doppler LmtPel Duplex Limited   Final Result   IMPRESSION:     1.  Right ovary is obscured by bowel gas and the left is only partially seen due to bowel gas.  What is seen appears normal on grey scale.    2.  There were both normal on the recent  CT and MRI without evidence of  ovarian cysts or masses.   3.  Uterus is unremarkable.   4.  No abnormalities are seen to explain pain.                     Janie Osborne MD  Emergency Medicine  [facility] EMERGENCY ROOM  [address] [address] [phone]       Janie Osborne MD  03/03/24 [id number]

## 2024-03-03 NOTE — ED TRIAGE NOTES
Pt presents to the ED with c/o worsening LLQ abdominal pain. Pain also in left lower back. Denies urinary sx. Last BM today without complaints or abnormalities.

## 2024-03-04 ASSESSMENT — ACTIVITIES OF DAILY LIVING (ADL)
ADLS_ACUITY_SCORE: 42
ADLS_ACUITY_SCORE: 42

## 2024-03-04 NOTE — ED NOTES
Pt agreeable to let writer use venipuncture to collect labs.  Blood was obtained with no complications.  Pt wanting to speak with provider.  Provider notified.

## 2024-03-04 NOTE — ED NOTES
Pt verbally upset about 1:1 going to the bathroom with her. Pt is upset about getting an abdominal Xray and wants to talk to the MD first. MD notified.

## 2024-03-04 NOTE — ED NOTES
Writer attempted to call pt legal guardian. First writer tried [phone] and it went straight to voicemail. Writer then tried [phone] and the voicemail was full so RN was unable to leave voicemail.

## 2024-03-04 NOTE — ED NOTES
Bed: [facility]04  Expected date: 3/3/24  Expected time: 6:01 PM  Means of arrival:   Comments:  Hold for TALIA

## 2024-03-04 NOTE — ED NOTES
"Per the patient's care plan the patient was staffed with a 1:1 sitter to ensure she does not ingest any foreign bodies. Patient is upset by this. She states \"how can you guys build trust in me if you don't give me a chance? I haven't done that shit in over a year\". Care plan policy explained and patient educated on policy. Patient asked to put belongings safely in a locker. Patient refusing, stating she is not giving up her stuff. Patient asked if we can remove stuff from her personal space (put in bathroom, set on chair nearby bed but not next to) and patient adamant that she will be keeping her stuff with her on the bed. Patient will have a 1:1 in the room with her continuously monitoring.  "

## 2024-03-04 NOTE — ED NOTES
Pt stated that if writer (1:1 sitter) does not stop staring at her she was going to start recording 1:1 sitter. PT stated that 1:1 sitter was staring at her like a dead rabbit on the floor and that sitter never blinked. Pt then became agitated upon discharge stating that she was going to leave without paper work. When pt was asked if they could give us the number for their group home so nurse could contact group home pt became more agitated yelling that they did not have a number for the group home. Pt stated she would like her paper work and then walked out of the room. Pt asked to writter for conformation from MD. Pt became more agitated in the hallway by the doc box in the ED. Pt started yelling at nurse and MD. Pt was informed that if she leave that security would be called. 1:1 sitter went and got security just in case.

## 2024-03-04 NOTE — ED NOTES
"Pt expresses extreme frustration with staff for having 1:1 at bedside. RN attempted to call [facility] to update on pt plan of care, but there is no listed phone number in patient chart. Writer asked RN if she had phone number to call group home and she stated \"no I don't have any way to contact them our manager quit and I am not allowed their personal phone number\". Pt stating they will order a Lyft home. Writer asked pt to wait in room in order to get permission from provider. Pt stated \"You will bring me my paperwork or I will be walking out in the next couple minutes\". Pt began walking towards exit and was uncooperative. RN once again tried to call both phone numbers given for patient PCA without any answer. Pt refusing discharge set of vitals. Per provider okay for patient to take Lyft home.   "

## 2024-03-04 NOTE — ED NOTES
"Pt refusing 975mg acetaminophen, pt requesting \"1000mg acetaminophen, I take 975mg at home and it does not do anything for my pain\". Pt also endorses nausea. Provider made aware of all information.   "

## 2024-03-06 ENCOUNTER — TRANSFERRED RECORDS (OUTPATIENT)
Dept: HEALTH INFORMATION MANAGEMENT | Facility: CLINIC | Age: 34
End: 2024-03-06

## 2024-03-13 ENCOUNTER — HOSPITAL ENCOUNTER (EMERGENCY)
Facility: CLINIC | Age: 34
Discharge: HOME | End: 2024-03-14
Attending: STUDENT IN AN ORGANIZED HEALTH CARE EDUCATION/TRAINING PROGRAM | Admitting: STUDENT IN AN ORGANIZED HEALTH CARE EDUCATION/TRAINING PROGRAM
Payer: COMMERCIAL

## 2024-03-13 ENCOUNTER — APPOINTMENT (OUTPATIENT)
Dept: GENERAL RADIOLOGY | Facility: CLINIC | Age: 34
End: 2024-03-13
Payer: COMMERCIAL

## 2024-03-13 VITALS
OXYGEN SATURATION: 95 % | TEMPERATURE: 97.8 F | DIASTOLIC BLOOD PRESSURE: 89 MMHG | RESPIRATION RATE: 19 BRPM | HEART RATE: 86 BPM | SYSTOLIC BLOOD PRESSURE: 134 MMHG

## 2024-03-13 DIAGNOSIS — R79.89 ELEVATED TROPONIN: ICD-10-CM

## 2024-03-13 DIAGNOSIS — R06.00 DYSPNEA: ICD-10-CM

## 2024-03-13 DIAGNOSIS — R07.89 RIGHT-SIDED CHEST WALL PAIN: Primary | ICD-10-CM

## 2024-03-13 LAB
ALBUMIN SERPL BCG-MCNC: 4.5 G/DL (ref 3.5–5.2)
ALP SERPL-CCNC: 93 U/L (ref 40–150)
ALT SERPL W P-5'-P-CCNC: 34 U/L (ref 0–50)
ANION GAP SERPL CALCULATED.3IONS-SCNC: 10 MMOL/L (ref 7–15)
AST SERPL W P-5'-P-CCNC: 19 U/L (ref 0–45)
BASOPHILS # BLD AUTO: 0 10E3/UL (ref 0–0.2)
BASOPHILS NFR BLD AUTO: 0 %
BILIRUB DIRECT SERPL-MCNC: <0.2 MG/DL (ref 0–0.3)
BILIRUB SERPL-MCNC: 0.2 MG/DL
BUN SERPL-MCNC: 7.4 MG/DL (ref 6–20)
CALCIUM SERPL-MCNC: 9.3 MG/DL (ref 8.6–10)
CHLORIDE SERPL-SCNC: 104 MMOL/L (ref 98–107)
CREAT SERPL-MCNC: 0.7 MG/DL (ref 0.51–0.95)
D DIMER PPP FEU-MCNC: 0.3 UG/ML FEU (ref 0–0.5)
DEPRECATED HCO3 PLAS-SCNC: 25 MMOL/L (ref 22–29)
EGFRCR SERPLBLD CKD-EPI 2021: >90 ML/MIN/1.73M2
EOSINOPHIL # BLD AUTO: 0.1 10E3/UL (ref 0–0.7)
EOSINOPHIL NFR BLD AUTO: 1 %
ERYTHROCYTE [DISTWIDTH] IN BLOOD BY AUTOMATED COUNT: 12.9 % (ref 10–15)
GLUCOSE SERPL-MCNC: 108 MG/DL (ref 70–99)
HCT VFR BLD AUTO: 40.5 % (ref 35–47)
HGB BLD-MCNC: 13.7 G/DL (ref 11.7–15.7)
HOLD SPECIMEN: NORMAL
IMM GRANULOCYTES # BLD: 0 10E3/UL
IMM GRANULOCYTES NFR BLD: 0 %
LIPASE SERPL-CCNC: 37 U/L (ref 13–60)
LYMPHOCYTES # BLD AUTO: 1.9 10E3/UL (ref 0.8–5.3)
LYMPHOCYTES NFR BLD AUTO: 18 %
MCH RBC QN AUTO: 31.1 PG (ref 26.5–33)
MCHC RBC AUTO-ENTMCNC: 33.8 G/DL (ref 31.5–36.5)
MCV RBC AUTO: 92 FL (ref 78–100)
MONOCYTES # BLD AUTO: 0.7 10E3/UL (ref 0–1.3)
MONOCYTES NFR BLD AUTO: 7 %
NEUTROPHILS # BLD AUTO: 7.6 10E3/UL (ref 1.6–8.3)
NEUTROPHILS NFR BLD AUTO: 74 %
NRBC # BLD AUTO: 0 10E3/UL
NRBC BLD AUTO-RTO: 0 /100
PLATELET # BLD AUTO: 302 10E3/UL (ref 150–450)
POTASSIUM SERPL-SCNC: 3.5 MMOL/L (ref 3.4–5.3)
PROT SERPL-MCNC: 7.5 G/DL (ref 6.4–8.3)
RBC # BLD AUTO: 4.41 10E6/UL (ref 3.8–5.2)
SODIUM SERPL-SCNC: 139 MMOL/L (ref 135–145)
TROPONIN T SERPL HS-MCNC: 11 NG/L
TROPONIN T SERPL HS-MCNC: 17 NG/L
WBC # BLD AUTO: 10.4 10E3/UL (ref 4–11)

## 2024-03-13 PROCEDURE — 250N000013 HC RX MED GY IP 250 OP 250 PS 637

## 2024-03-13 PROCEDURE — 85379 FIBRIN DEGRADATION QUANT: CPT

## 2024-03-13 PROCEDURE — 36415 COLL VENOUS BLD VENIPUNCTURE: CPT

## 2024-03-13 PROCEDURE — 96374 THER/PROPH/DIAG INJ IV PUSH: CPT

## 2024-03-13 PROCEDURE — 85004 AUTOMATED DIFF WBC COUNT: CPT

## 2024-03-13 PROCEDURE — 96375 TX/PRO/DX INJ NEW DRUG ADDON: CPT

## 2024-03-13 PROCEDURE — 36415 COLL VENOUS BLD VENIPUNCTURE: CPT | Performed by: STUDENT IN AN ORGANIZED HEALTH CARE EDUCATION/TRAINING PROGRAM

## 2024-03-13 PROCEDURE — 84484 ASSAY OF TROPONIN QUANT: CPT

## 2024-03-13 PROCEDURE — 71046 X-RAY EXAM CHEST 2 VIEWS: CPT

## 2024-03-13 PROCEDURE — 93005 ELECTROCARDIOGRAM TRACING: CPT

## 2024-03-13 PROCEDURE — 84484 ASSAY OF TROPONIN QUANT: CPT | Mod: 91 | Performed by: STUDENT IN AN ORGANIZED HEALTH CARE EDUCATION/TRAINING PROGRAM

## 2024-03-13 PROCEDURE — 82248 BILIRUBIN DIRECT: CPT

## 2024-03-13 PROCEDURE — 83690 ASSAY OF LIPASE: CPT

## 2024-03-13 PROCEDURE — 250N000011 HC RX IP 250 OP 636

## 2024-03-13 PROCEDURE — 80048 BASIC METABOLIC PNL TOTAL CA: CPT

## 2024-03-13 PROCEDURE — 99285 EMERGENCY DEPT VISIT HI MDM: CPT | Mod: 25

## 2024-03-13 RX ORDER — ACETAMINOPHEN 500 MG
1000 TABLET ORAL ONCE
Status: COMPLETED | OUTPATIENT
Start: 2024-03-13 | End: 2024-03-13

## 2024-03-13 RX ORDER — ONDANSETRON 2 MG/ML
4 INJECTION INTRAMUSCULAR; INTRAVENOUS ONCE
Status: COMPLETED | OUTPATIENT
Start: 2024-03-13 | End: 2024-03-13

## 2024-03-13 RX ORDER — KETOROLAC TROMETHAMINE 15 MG/ML
15 INJECTION, SOLUTION INTRAMUSCULAR; INTRAVENOUS ONCE
Status: COMPLETED | OUTPATIENT
Start: 2024-03-13 | End: 2024-03-13

## 2024-03-13 RX ADMIN — ACETAMINOPHEN 1000 MG: 500 TABLET ORAL at 21:29

## 2024-03-13 RX ADMIN — KETOROLAC TROMETHAMINE 15 MG: 15 INJECTION, SOLUTION INTRAMUSCULAR; INTRAVENOUS at 22:20

## 2024-03-13 RX ADMIN — ONDANSETRON 4 MG: 2 INJECTION INTRAMUSCULAR; INTRAVENOUS at 21:29

## 2024-03-13 ASSESSMENT — COLUMBIA-SUICIDE SEVERITY RATING SCALE - C-SSRS
1. IN THE PAST MONTH, HAVE YOU WISHED YOU WERE DEAD OR WISHED YOU COULD GO TO SLEEP AND NOT WAKE UP?: NO
2. HAVE YOU ACTUALLY HAD ANY THOUGHTS OF KILLING YOURSELF IN THE PAST MONTH?: NO
6. HAVE YOU EVER DONE ANYTHING, STARTED TO DO ANYTHING, OR PREPARED TO DO ANYTHING TO END YOUR LIFE?: NO

## 2024-03-13 ASSESSMENT — ACTIVITIES OF DAILY LIVING (ADL)
ADLS_ACUITY_SCORE: 42

## 2024-03-14 LAB
ATRIAL RATE - MUSE: 100 BPM
DIASTOLIC BLOOD PRESSURE - MUSE: NORMAL MMHG
INTERPRETATION ECG - MUSE: NORMAL
P AXIS - MUSE: 36 DEGREES
PR INTERVAL - MUSE: 150 MS
QRS DURATION - MUSE: 70 MS
QT - MUSE: 324 MS
QTC - MUSE: 417 MS
R AXIS - MUSE: 59 DEGREES
SYSTOLIC BLOOD PRESSURE - MUSE: NORMAL MMHG
T AXIS - MUSE: 12 DEGREES
TROPONIN T SERPL HS-MCNC: 10 NG/L
VENTRICULAR RATE- MUSE: 100 BPM

## 2024-03-14 PROCEDURE — 250N000012 HC RX MED GY IP 250 OP 636 PS 637: Performed by: EMERGENCY MEDICINE

## 2024-03-14 PROCEDURE — 36415 COLL VENOUS BLD VENIPUNCTURE: CPT

## 2024-03-14 PROCEDURE — 84484 ASSAY OF TROPONIN QUANT: CPT

## 2024-03-14 RX ORDER — PREDNISONE 20 MG/1
TABLET ORAL
Qty: 10 TABLET | Refills: 0 | Status: SHIPPED | OUTPATIENT
Start: 2024-03-14 | End: 2024-03-28

## 2024-03-14 RX ORDER — PREDNISONE 20 MG/1
20 TABLET ORAL ONCE
Status: COMPLETED | OUTPATIENT
Start: 2024-03-14 | End: 2024-03-14

## 2024-03-14 RX ADMIN — PREDNISONE 20 MG: 20 TABLET ORAL at 02:02

## 2024-03-14 ASSESSMENT — ACTIVITIES OF DAILY LIVING (ADL)
ADLS_ACUITY_SCORE: 42
ADLS_ACUITY_SCORE: 42

## 2024-03-14 NOTE — ED PROVIDER NOTES
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 Assessments:  Interventions:  Medications   ondansetron (ZOFRAN) injection 4 mg (4 mg Intravenous $Given 3/13/24 2129)   acetaminophen (TYLENOL) tablet 1,000 mg (1,000 mg Oral $Given 3/13/24 2129)   ketorolac (TORADOL) injection 15 mg (15 mg Intravenous $Given 3/13/24 2220)      Assessments and  Consultations:  Patient was seen in conjunction with attending physician Dr. Mckeon.       I performed my initial evaluation of the patient  ED Course as of 03/13/24 2335   Wed Mar 13, 2024   2200 Rechecked patient.  Pain improved.   2317 Troponin T, High Sensitivity(!): 17    Patient updated on results and plan.     Independent Interpretation (X-rays, CTs, rhythm strip):  Chest x-ray shows clear lung fields.    Social Determinants of Health affecting care:   Chronic health problems.  Care plan in place.    Disposition:  Care of the patient was transferred to my colleague Dr. Cannon pending 3rd troponin.     Impression & Plan    Medical Decision Makin year old female here as above.  On arrival, vital signs notable for slight tachycardia but otherwise stable.  Exam notable for young female who appears uncomfortable with no adventitious lung sounds, calf swelling or tenderness.  Differential included ACS, PE, dissection, pneumonia, pleural effusion, pancreatitis.  Normal d dimer, doubt PE.  Chest x-ray reassuring.  EKG without any acute ischemic changes.    Initial troponin stable, 2 hour repeat troponin shows 6 point increase outside of gender normal range.  HEART score 1, though given this new elevation in troponin level, will repeat troponin again in 2 hours.      Symptoms do sound most consistent with a pleurisy here.  She has no persisting shortness of breath and chest pain has improved with the above interventions.      Diagnosis:    ICD-10-CM    1. Right-sided chest wall pain  R07.89       2. Dyspnea  R06.00          Dilcai Montelongo PA-C  2024   [facility]           Dilcia Montelongo PA-C  24       Dilcia Montelongo PA-C  24

## 2024-03-14 NOTE — ED NOTES
I received signout on this patient from my partner, Dr. Mckeon and Dilcia Montelongo PA-C.  Please see the initial H&P for full specifics.  Briefly, I was asked to follow-up on a third troponin.  The patient's initial troponin was normal, the second was elevated, and if there is no further elevation after the second 1 it was felt the patient could be safely discharged.    The third troponin is normal.  The patient is tearful noting that she has had chest pain for around 2 months.  She notes that since November of last year she has had multiple infections including influenza and RSV.  The working diagnosis today appears to be pleurisy which fits with the patient's symptoms.  She states that Tylenol and ibuprofen do not seem to work.    At the conclusion of her workup the patient wanted to leave though there was a question about her decision-making ability.  We attempted to call the guardian as well as a supervisor for the conservatorship and we are unable to get a hold of them.  The patient states this is a new guardian who has said the patient will be given her freedom as long as she can handle it.    After talking with the patient, we will plan on discharging her to her own care.  I will also send a prescription for steroids to see if that may help some of the patient's symptoms.    Addendum:  The guardian called back noting the patient was able to be discharged to her own care and make her transport decision herself.    Impression:    ICD-10-CM    1. Right-sided chest wall pain  R07.89       2. Dyspnea  R06.00       3. Elevated troponin  R79.89           Meds:  New Prescriptions    PREDNISONE (DELTASONE) 20 MG TABLET    Take two tablets (= 40mg) each day for 5 (five) days          Adam Cannon, DO  03/14/24 0156       Adma Cannon, DO  03/14/24 0158       Adam Cannon, DO  03/14/24 0159

## 2024-03-14 NOTE — ED NOTES
Judith (Guardian via [employer])    0138: Attempted to call Judith at [phone]. No answer.   0150: Attempted to call Judith but no answer.  0159: Call and was able to reach Judith. Patient will be discharge from the ED soon. Patient was offer ems transport back to her group home. Patient stated that it will take too long to arrange for ems transport back to her group home and patient does not want to wait. Patient stated that she has gone through this before if she has to wait any longer for the ems transport, then she will pull her IV out, removed all the monitor leads, and walk out. MD made aware. MD came and spoke with patient. Judith informed that patient is refusing ems transport ride home and wants to leave on her own accord. Per Judith, patient is able to make her own decision and can leave on her own. MD made aware.

## 2024-03-14 NOTE — DISCHARGE INSTRUCTIONS
Ibuprofen 600 mg every 6 hours OR Naprosyn 800 mg twice daily as an anti-inflammatory for chest wall pain.  Can also use Tylenol 1000 mg every 6-8 hours as needed for pain  Can consider using ice or heat on areas of muscle soreness  Continue with at home medications as prescribed  Follow up with your primary care provider in the next 1 week    Discharge Instructions  Chest Pain    You have been seen today for chest pain or discomfort.  At this time, your provider has found no signs that your chest pain is due to a serious or life-threatening condition, (or you have declined more testing and/or admission to the hospital). However, sometimes there is a serious problem that does not show up right away. Your evaluation today may not be complete and you may need further testing and evaluation.     Generally, every Emergency Department visit should have a follow-up clinic visit with either a primary or a specialty clinic/provider. Please follow-up as instructed by your emergency provider today.  Return to the Emergency Department if:  Your chest pain changes, gets worse, starts to happen more often, or comes with less activity.  You are newly short of breath.  You get very weak or tired.  You pass out or faint.  You have any new symptoms, like fever, cough, numb legs, or you cough up blood.  You have anything else that worries you.    Until you follow-up with your regular provider, please do the following:  Take one aspirin daily unless you have an allergy or are told not to by your provider.  If a stress test appointment has been made, go to the appointment.  If you have questions, contact your regular provider.  Follow-up with your regular provider/clinic as directed; this is very important.    If you were given a prescription for medicine here today, be sure to read all of the information (including the package insert) that comes with your prescription.  This will include important information about the medicine, its  side effects, and any warnings that you need to know about.  The pharmacist who fills the prescription can provide more information and answer questions you may have about the medicine.  If you have questions or concerns that the pharmacist cannot address, please call or return to the Emergency Department.       Remember that you can always come back to the Emergency Department if you are not able to see your regular provider in the amount of time listed above, if you get any new symptoms, or if there is anything that worries you.

## 2024-03-14 NOTE — ED NOTES
Bed: ED36  Expected date: 3/13/24  Expected time: 8:20 PM  Means of arrival: Ambulance  Comments:  Harry [address] 33F SOB/Chest pain, hx of PE

## 2024-03-14 NOTE — ED PROVIDER NOTES
ED ATTENDING PHYSICIAN NOTE:   I evaluated this patient in conjunction with Dilcia Montelongo PA-C. I have participated in the care of the patient and personally performed key elements of the history, exam, and medical decision making.     HPI:   Nevin Alvarado is a very pleasant 33 year old female presenting with right-sided chest pain.  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  Chest pain is pleuritic, not associated with exertion, sudden onset about an hour prior to arrival.  No fever, chills, cough or hemoptysis.  She does complain of numerous superficial infections over recent months.    Independent Historian:   Independent history was obtained from EMS. They provided information detailed above in HPI.     Review of External Notes:  I reviewed the patient's care plans, which She Has for Frequent Emergency Department Utilization and Frequently Ingestion of Foreign Objects     EXAM:   Patient Vitals for the past 24 hrs:   BP Temp Temp src Pulse Resp SpO2   03/13/24 2300 -- -- -- 86 19 95 %   03/13/24 2230 -- -- -- 93 22 95 %   03/13/24 2200 -- -- -- 105 13 97 %   03/13/24 2130 -- -- -- 88 11 99 %   03/13/24 2030 -- -- -- 105 15 100 %   03/13/24 2024 134/89 97.8  F (36.6  C) Oral 105 20 100 %      Physical Exam  Vitals and nursing note reviewed.   Constitutional:       Appearance: She is obese. She is not ill-appearing.   Cardiovascular:      Rate and Rhythm: Normal rate and regular rhythm.   Pulmonary:      Effort: Pulmonary effort is normal.      Breath sounds: Normal breath sounds.   Skin:     General: Skin is warm and dry.      Findings: No rash.   Neurological:      Mental Status: She is alert and oriented to person, place, and time.         Independent Interpretation (X-rays, CTs, rhythm strip):  I independently interpreted the patient's chest x-ray; reassuring against infiltrate.    Consultations/Discussion of Management or Tests:  None    Social Determinants  of [facility] affecting care:   None.       MEDICAL DECISION MAKING/ASSESSMENT AND PLAN:   Patient presenting with acute onset right-sided pleuritic nonexertional chest pain.  Vital signs initially notable for tachycardia but now normalized.  Considered differential including pleuritis, pneumonia, pulmonary embolism, gallbladder or hepatic pathology or pancreatitis, acute coronary syndrome, among others.  Workup here was reassuring.  Patient was moderate risk by Wells score and a D-dimer was obtained which was reassuring against pulmonary embolism.  No CT testing was indicated.  Chest x-ray was reassuring against pneumonia.  EKG showed no acute appearing ischemic changes.  Patient's initial troponin was within normal limits and the second was mildly elevated slightly above normal range.  For this reason, we will obtain a third troponin to rule out acute coronary syndrome.  Overall impression is that pleuritis is most likely, especially given patient's numerous recent respiratory infections.    DIAGNOSIS:     ICD-10-CM    1. Right-sided chest wall pain  R07.89       2. Dyspnea  R06.00       3. Elevated troponin  R79.89            DISPOSITION:   Care of the patient was transferred to my colleague Dr. Cannon pending delta troponin.      Bartolo Mckeon MD  03/14/24 0005

## 2024-03-15 ENCOUNTER — TELEPHONE (OUTPATIENT)
Dept: GASTROENTEROLOGY | Facility: CLINIC | Age: 34
End: 2024-03-15
Payer: COMMERCIAL

## 2024-03-15 ENCOUNTER — MYC MEDICAL ADVICE (OUTPATIENT)
Dept: GASTROENTEROLOGY | Facility: CLINIC | Age: 34
End: 2024-03-15
Payer: COMMERCIAL

## 2024-03-15 ENCOUNTER — HOSPITAL ENCOUNTER (OUTPATIENT)
Facility: CLINIC | Age: 34
End: 2024-03-15
Attending: INTERNAL MEDICINE | Admitting: INTERNAL MEDICINE
Payer: COMMERCIAL

## 2024-03-15 DIAGNOSIS — T18.9XXS SWALLOWED FOREIGN BODY, SEQUELA: ICD-10-CM

## 2024-03-15 DIAGNOSIS — R13.19 ESOPHAGEAL DYSPHAGIA: ICD-10-CM

## 2024-03-15 DIAGNOSIS — K21.9 GASTROESOPHAGEAL REFLUX DISEASE, UNSPECIFIED WHETHER ESOPHAGITIS PRESENT: Primary | ICD-10-CM

## 2024-03-15 RX ORDER — OMEPRAZOLE 40 MG/1
40 CAPSULE, DELAYED RELEASE ORAL
Qty: 180 CAPSULE | Refills: 3 | Status: SHIPPED | OUTPATIENT
Start: 2024-03-15 | End: 2024-03-15

## 2024-03-15 NOTE — TELEPHONE ENCOUNTER
Spoke with provider regarding pt's symptoms and recommended that pt increase her PPI to omeprazole 40 mg BID and sooner appointment with provider to discuss symptoms. Currently looking to find a sooner date for pt's EGD as it is currently scheduled in September.

## 2024-03-15 NOTE — TELEPHONE ENCOUNTER
Spoke with pt regarding plan. Pt recently received a new prescription for pantoprazole from pt's PCP through [facility] and would like to trial that first before going back to omeprazole BID 40 mg. Encouraged pt to trial the medication for at least 2 weeks to see if it is helping pt reported she was not feeling better with the omeprazole 40 mg daily.     Pt will call back if pantoprazole is not helping and would like the prescription for BID omeprazole 40 mg. Providers are working to look for sooner availability to move up pt's EGD.    Verbally agreed to a virtual visit for 4/24 at 7:00 am with provider to discuss further.

## 2024-03-15 NOTE — TELEPHONE ENCOUNTER
"[facility] Call Center    Phone Message    May a detailed message be left on voicemail: yes     Reason for Call: Symptoms or Concerns     If patient has red-flag symptoms, warm transfer to triage line    Current symptom or concern: \"stomach ulcers\"     Symptoms have been present for:  4 week(s)    *Please call to discuss symptoms as patient said she wants a sooner appointment and can't wait until the one she has currently scheduled in May.         Action Taken: Message routed to:  [facility] GI    Travel Screening: Not Applicable                                                                    "

## 2024-03-15 NOTE — TELEPHONE ENCOUNTER
"Endoscopy Scheduling Screen    Have you had a positive Covid test in the last 14 days?  No    What is your communication preference for Instructions and/or Bowel Prep?   MyChart    What insurance is in the chart?  Other:  [employer]    Ordering/Referring Provider: TOM NAZARIO   (If ordering provider performs procedure, schedule with ordering provider unless otherwise instructed. )    BMI: Estimated body mass index is 47.37 kg/m  as calculated from the following:    Height as of 3/3/24: 1.575 m (5' 2\").    Weight as of 3/3/24: 117.5 kg (259 lb).     Sedation Ordered  MAC/deep sedation.   BMI<= 45 45 < BMI <= 48 48 < BMI < = 50  BMI > 50   No Restrictions No [facility]  No [facility]  [facility] with exceptions Hospital Only OR Only       Do you have a history of malignant hyperthermia?  No    (Females) Are you currently pregnant?   No     Have you been diagnosed or told you have pulmonary hypertension?   No    Do you have an LVAD?  No    Have you been told you have moderate to severe sleep apnea?  Yes (RN Review required for scheduling unless scheduling in Hospital.)    Have you been told you have COPD, asthma, or any other lung disease?  No    Do you have any heart conditions?  No     Have you ever had or are you waiting for an organ transplant?  No. Continue scheduling, no site restrictions.    Have you had a stroke or transient ischemic attack (TIA aka \"mini stroke\" in the last 6 months?   No    Have you been diagnosed with or been told you have cirrhosis of the liver?   No    Are you currently on dialysis?   No    Do you need assistance transferring?   No    BMI: Estimated body mass index is 47.37 kg/m  as calculated from the following:    Height as of 3/3/24: 1.575 m (5' 2\").    Weight as of 3/3/24: 117.5 kg (259 lb).     Is patients BMI > 40 and scheduling location [facility]?  Yes (If MAC sedation is ordered, schedule PAC eval)    Do you take an injectable medication for weight loss or diabetes (excluding insulin)?  Yes, " hold time can be up to 7 days. Please consult with you prescribing provider to discuss endoscopy recommendations.     Do you take the medication Naltrexone?  No    Do you take blood thinners?  No       Prep   Are you currently on dialysis or do you have chronic kidney disease?  No    Do you have a diagnosis of diabetes?  Yes (Golytely Prep)    Do you have a diagnosis of cystic fibrosis (CF)?  No    On a regular basis do you go 3 -5 days between bowel movements?  No    BMI > 40?  Yes (Extended Prep)    Preferred Pharmacy:    [id number] - [facility], [place] - [address] [address]  Phone: [phone] Fax: [phone]      Final Scheduling Details     Procedure scheduled  Upper endoscopy (EGD)    Surgeon:  DALTON     Date of procedure:  9/10/24     Pre-OP / PAC:   Yes - Patient informed of pre-op requirement.    Location  [facility] - Per order.    Sedation   MAC/Deep Sedation - Per order.      Patient Reminders:   You will receive a call from a Nurse to review instructions and health history.  This assessment must be completed prior to your procedure.  Failure to complete the Nurse assessment may result in the procedure being cancelled.      On the day of your procedure, please designate an adult(s) who can drive you home stay with you for the next 24 hours. The medicines used in the exam will make you sleepy. You will not be able to drive.      You cannot take public transportation, ride share services, or non-medical taxi service without a responsible caregiver.  Medical transport services are allowed with the requirement that a responsible caregiver will receive you at your destination.  We require that drivers and caregivers are confirmed prior to your procedure.

## 2024-03-28 ENCOUNTER — TELEPHONE (OUTPATIENT)
Dept: ENDOCRINOLOGY | Facility: CLINIC | Age: 34
End: 2024-03-28

## 2024-03-28 ENCOUNTER — VIRTUAL VISIT (OUTPATIENT)
Dept: ENDOCRINOLOGY | Facility: CLINIC | Age: 34
End: 2024-03-28
Payer: COMMERCIAL

## 2024-03-28 VITALS — HEIGHT: 62 IN | BODY MASS INDEX: 45.93 KG/M2 | WEIGHT: 249.6 LBS

## 2024-03-28 DIAGNOSIS — E66.01 CLASS 3 SEVERE OBESITY WITH SERIOUS COMORBIDITY AND BODY MASS INDEX (BMI) OF 50.0 TO 59.9 IN ADULT, UNSPECIFIED OBESITY TYPE (H): Primary | ICD-10-CM

## 2024-03-28 DIAGNOSIS — E66.813 CLASS 3 SEVERE OBESITY WITH SERIOUS COMORBIDITY AND BODY MASS INDEX (BMI) OF 50.0 TO 59.9 IN ADULT, UNSPECIFIED OBESITY TYPE (H): Primary | ICD-10-CM

## 2024-03-28 DIAGNOSIS — K59.00 CONSTIPATION, UNSPECIFIED CONSTIPATION TYPE: ICD-10-CM

## 2024-03-28 PROCEDURE — 99215 OFFICE O/P EST HI 40 MIN: CPT | Mod: 95 | Performed by: NURSE PRACTITIONER

## 2024-03-28 RX ORDER — PANTOPRAZOLE SODIUM 40 MG/1
40 TABLET, DELAYED RELEASE ORAL DAILY
COMMUNITY
Start: 2024-03-15

## 2024-03-28 RX ORDER — POLYETHYLENE GLYCOL 3350 17 G/17G
17 POWDER, FOR SOLUTION ORAL DAILY PRN
COMMUNITY
Start: 2024-03-15

## 2024-03-28 ASSESSMENT — PAIN SCALES - GENERAL: PAINLEVEL: SEVERE PAIN (7)

## 2024-03-28 NOTE — TELEPHONE ENCOUNTER
Patient Contacted and scheduled the following:    Appointment type: [facility]  Provider: Sharon Toro NP  Return date: 04/11/2024  Specialty phone number: [phone]  Additional appointment(s) needed: no   Additonal Notes: Pt added to provider's schedule per in basket from provider

## 2024-03-28 NOTE — PATIENT INSTRUCTIONS
"Thank you for allowing us the privilege of caring for you. We hope we provided you with the excellent service you deserve.   Please let us know if there is anything else we can do for you so that we can be sure you are completely satisfied with your care experience.    To ensure the quality of our services you may be receiving a patient satisfaction survey from an independent patient satisfaction monitoring company.    The greatest compliment you can give is a \"Likely to Recommend\"    Your visit was with Sharon Toro NP today.    Instructions per today's visit:     Jay Alvarado, it was great to visit with you today.  Here is a review of our visit.  If our clinic scheduler is not able to reach you please call [phone] to schedule your next appointments.    Restart wegovy 1mg tomorrow   Hold wegovy before hysterectomy- last dose may 5th   Talk to cadi  about ensure protein max 30g protein- need high protein diet   Take miralax daily   Take metamucil every other day until going well then increase to daily   Can use glycerine or fleets enema if having difficulty passing stool   Check blood sugar accuracy with hospital tomorrow with cy - consider switching probes for cy   Add electrolytes - push fluids - goal is 64oz daily   Focus on protein first at meals   Follow up 3 months       Information about Video Visits with [facility]: video visit information  _________________________________________________________________________________________________________________________________________________________  If you are asked by your clinic team to have your blood pressure checked:  [facility] do offer several locations for blood pressure checks. Please follow the below link to schedule an appointment. Scheduling an appointment at the pharmacy for a blood pressure check is now preferred.    Appointment Plus " ([email or web address])  _________________________________________________________________________________________________________________________________________________________  Important contact and scheduling information:  Please call our contact center at [phone] to schedule your next appointments.  To find a lab location near you, please call [phone].  For any nursing questions or concerns call Kathy Rene LPN at [phone] or Rhina Garcia RN at [phone]  Please call during clinic hours Monday through Friday 8:00a - 4:00p if you have questions or you can contact us via [email or web address] at anytime and we will reply during clinic hours.    Lab results will be communicated through My Chart or letter (if My Chart not used). Please call the clinic if you have not received communication after 1 week or if you have any questions.?  Clinic Fax: [phone]    _________________________________________________________________________________________________________________________________________________________  Meal Replacement Products:    Here is the link to our new e-store where you can purchase our meal replacement products    [facility] E-Store  [email or web address]    The one week starter kit is a great way to sample a variety of products and see what works for you.    If you want more information about the product go to: [employer] Meals  [employer]    If you are an employee or [facility] or [facility] please contact your care team for a 10% estore discount    Free Shipping for orders over $75     Benefits of meal replacements products:    Portion and calorie control  Improved nutrition  Structured eating  Simplified food choices  Avoid contact with trigger foods  _________________________________________________________________________________________________________________________________________________________  Interested in working with a health  ?  Health coaches work with you to improve your overall health and wellbeing.  They look at the whole person, and may involve discussion of different areas of life, including, but not limited to the four pillars of health (sleep, exercise, nutrition, and stress management). Discuss with your care team if you would like to start working a health .  Health Coaching-3 Pack: Schedule by calling [phone]    $99 for three health coaching visits    Visits may be done in person or via phone    Coaching is a partnership between the  and the client; Coaches do not prescribe or diagnose    Coaching helps inspire the client to reach his/her personal goals   _________________________________________________________________________________________________________________________________________________________  24 Week Healthy Lifestyle Plan:    Our mission in the 24-week Healthy Lifestyle Plan is to provide you with individualized care by giving you the tools, education and support you need to lose weight and maintain a healthy lifestyle. In your 24-week journey, you ll be supported by a dedicated weight loss team that includes registered dietitians, medical weight management providers, health coaches, and nurses -- all with special expertise in weight loss -- to help you every step of the way.     Monthly meetings with your registered dietician or medical weight management provider help to review your progress, update your care plan, and make any adjustments needed to ensure success. Between these visits, weekly and bi-weekly health  visits will help you focus on the four pillars of weight loss -- stress, sleep, nutrition, and exercise -- and how you can best adapt each to achieve sustainable weight loss results.    In addition, you will be given exclusive access to online wellbeing classes through [employer].  Your initial visit will be with a medical weight management provider who will help to  understand your weight loss goals and ensure this program is the right fit for you. Please let our team know if you are interested in the 24 week plan by sending a message to your care team or calling [phone] to schedule.  _________________________________________________________________________________________________________________________________________________________  __________  [facility] Get Moving Program  Our team of physical therapists is trained to help you understand and take control of your condition. They will perform a thorough evaluation to determine your ability for activity and develop a customized plan to fit your goals and physical ability.  Scheduling: Unsure if the Get Moving program is right for you? Discuss the program with your medical provider or diabetes educator. You can also call us at [phone] to ask questions or schedule an appointment.   [facility] Get Moving Program  ____________________________________________________________________________________________________________________________________________________________________________  [facility] Diabetes Prevention Program (DPP)  If you have prediabetes and Medicare please contact us via [email or web address] to learn more about the Diabetes Prevention Program (DPP)  Program Details:   [facility] offers the year-long Diabetes Prevention Program (DPP). The program helps you to make lifestyle changes that prevent or delay type 2 diabetes by supporting healthy eating, increased physical activity, stress reduction and use of coping skills.   On average, previous [facility] DPP cohorts have lost and maintained at least 5% of their starting weight throughout the program and averaged more than 150 minutes of physical activity per week.  Participants meet weekly for one-hour group sessions over sixteen weeks, every other week for the next 8 weeks, and monthly for the last six months.   A year-long  maintenance program is also available for participants who complete the first year.   Location & Cost:   During the COVID-19 Public Health Emergency, the program is offered virtually. When in-person classes can resume, they will be held at [facility].  For people with Medicare, the program is covered in full. A self-pay option will also be available for those with non-Medicare insurance plans.   ______________________________________________________________________________________________________________________________________________________________________________________________________________________________    To work with a Behavioral Health Psychologist:    Call to schedule:    Isai Lindsey - [phone]  Vj Scales - [phone]  Carli Perea - [phone]  Cassia Jung - [phone]   Roya Zamora PhD (cannot accept Medicare) [phone]        Thank you,   [facility] Comprehensive Weight Management Team

## 2024-03-28 NOTE — PROGRESS NOTES
Return Medical Weight Management Note     Nevin Alvarado  MRN:  [id number]  :  1990  MOR:  3/28/2024    Dear Latonya Knight MD,    I had the pleasure of seeing your patient Nevin Alvarado. She is a 33 year old female who I am continuing to see for treatment of obesity related to:        2023    12:48 PM   --   I have the following health issues associated with obesity Type II Diabetes    High Blood Pressure    Sleep Apnea    Polycystic Ovarian Syndrome    GERD (Reflux)    Fatty Liver    Asthma    Stress Incontinence       Assessment & Plan   Problem List Items Addressed This Visit        Digestive    Class 3 severe obesity with serious comorbidity and body mass index (BMI) of 50.0 to 59.9 in adult, unspecified obesity type (H) - Primary     Mostly tolerating wegovy 1mg. Concern for inadequate nutrition/ hydration since worsening abdominal pain. Has been seen [facility] and has endoscopy scheduled for tomorrow. While not clearly caused by wegovy, abdominal pain likely compounded by worsening constipation secondary to wegovy. Of note, metformin was stopped in December by PCP and constipation has gotten worse since that time as well. Discussed needing to be aggressive with bowel management with wegovy. Discussed needing to be intentional about nutrition and hydration while taking wegovy also. May need to consider dose reduction if symptoms don't improve. Availability of 0.5mg is low so this is impacting decision making. Patient comfortable with being more aggressive with bowel management, hydration and protein. Focus more on hydration as diet recall sounds okay. Will follow up in a few weeks.     Patient using freestyle cy but noticing more variability in the last 2-3 weeks in blood sugars. More recently since she changed the last sensor in the ED 3/22. Question if needs new sensor. But, variability may be secondary to her being off GLp1 for EGD- varying from . Her last A1C was 5.6  "but that was while taking wegovy and off metformin x 3 months. A1C was 5.4 while taking metformin. Will continue to monitor. Patient will have EGD tomorrow and ask to compare her sensor to blood glucose there.     Restart wegovy 1mg tomorrow   Hold wegovy before hysterectomy- last dose may 5th   Talk to cadi  about ensure protein max 30g protein- need high protein diet   Take miralax daily   Take metamucil every other day until going well then increase to daily   Can use glycerine or fleets enema if having difficulty passing stool   Check blood sugar accuracy with hospital tomorrow with pamela - consider switching probes for pamela   Add electrolytes - push fluids - goal is 64oz daily   Focus on protein first at meals   Follow up 3 months          Relevant Medications    sodium phosphate (FLEET ENEMA) 7-19 GM/118ML rectal enema    Semaglutide-Weight Management (WEGOVY) 1 MG/0.5ML pen    Constipation, unspecified constipation type    Relevant Medications    polyethylene glycol (MIRALAX) 17 GM/Dose powder    PSYLLIUM PO    sodium phosphate (FLEET ENEMA) 7-19 GM/118ML rectal enema          INTERVAL HISTORY:    5day course of prednisone 3-4 weeks ago - blood sugar spiked a couple days after being on prednisone. Pamela was accurate 3/22 in ED by comparison to labs in ED but had to change sensors that day and     Excruciating pain and nausea with eating - a little while after eating - associated with chest pain/pressure like band- has been worked up in the ED which was always normal - pain improves several hours after eating. Has episodes where she wakes up feeling fine other days feels dizzy/ light headed. has EGD schedule     Anti-obesity medication history    Current:   wegovy 1mg - has been off x 2 weeks because of needing EGD tomorrow     Failed:   Metformin - stopped by PCP12/2023  when starting wegvoy because A1C was \"good\" (5.4)     Recent diet changes:   Since having this pain she has been naturally " eating less   Still Can eat 4 inch sub with meals   Has gotten pain after eating just a pudding cup   Pain with drinking protein shake   Still trying to eat at each meal   Water intake has gone down     Recent stressors:   Had been sick with URI much of Dec/ Jan - feeling better from that     Recent sleep changes: okay     Vitamins/Labs:   Using freestyle cy     Constipation: going weeks without bowel movements - seen in ED for impaction. Taking miralax and metamucil some days. Now, having BM every couple days and firmer BM     CURRENT WEIGHT:   249 lbs 9.6 oz    Initial Weight (lbs): 309 lbs  Last Visits Weight: 117.5 kg (259 lb)  Cumulative weight loss (lbs): 59.4  Weight Loss Percentage: 19.22%        1/5/2024     1:00 PM   Changes and Difficulties   I have made the following changes to my diet since my last visit: been sick, so no appetite   I have made the following changes to my activity/exercise since my last visit: none         MEDICATIONS:   Current Outpatient Medications   Medication Sig Dispense Refill     acetaminophen (TYLENOL) 500 MG tablet Take 2 tablets (1,000 mg) by mouth every 6 hours as needed for pain or fever 60 tablet 0     albuterol (PROAIR HFA/PROVENTIL HFA/VENTOLIN HFA) 108 (90 Base) MCG/ACT inhaler Inhale 2 puffs into the lungs every 6 hours as needed for shortness of breath / dyspnea or wheezing 18 g 0     albuterol (PROVENTIL) (2.5 MG/3ML) 0.083% neb solution Take 1 vial (2.5 mg) by nebulization every 6 hours as needed for shortness of breath or wheezing 90 mL 0     Cholecalciferol (D3 HIGH POTENCY) 25 MCG (1000 UT) CAPS Take 50 mcg by mouth daily       clonazePAM (KLONOPIN) 0.5 MG tablet Take 0.5mg daily PRN anxiety- 20 tablets per month, try to keep it to 3-4x per week       ferrous sulfate (FEROSUL) 325 (65 Fe) MG tablet Take 1 tablet (325 mg) by mouth daily (with breakfast) 30 tablet 0     montelukast (SINGULAIR) 10 MG tablet Take 10 mg by mouth every evening       norethindrone  (AYGESTIN) 5 MG tablet Take 5 mg by mouth daily       ondansetron (ZOFRAN-ODT) 4 MG ODT tab Take 1 tablet (4 mg) by mouth every 8 hours as needed for nausea 15 tablet 0     pantoprazole (PROTONIX) 40 MG EC tablet Take 40 mg by mouth       polyethylene glycol (MIRALAX) 17 GM/Dose powder Take 17 g by mouth       pregabalin (LYRICA) 100 MG capsule Take 100 mg by mouth every morning       PSYLLIUM PO Take 1 tsp by mouth       Semaglutide-Weight Management (WEGOVY) 1 MG/0.5ML pen Inject 1 mg Subcutaneous once a week 2 mL 3     sodium phosphate (FLEET ENEMA) 7-19 GM/118ML rectal enema Place 1 Bottle (1 enema) rectally once for 1 dose 133 mL 0     valACYclovir (VALTREX) 1000 mg tablet Take 2,000 mg by mouth 2 times daily as needed Take 2 tablets by mouth two times daily for one day. Use as needed at onset of cold sore.       BANOPHEN 2-0.1 % external cream Apply 1 applicator topically 2 times daily as needed for itching (Patient not taking: Reported on 12/1/2023)       benzonatate (TESSALON) 100 MG capsule Take 1 capsule (100 mg) by mouth 3 times daily as needed for cough (Patient not taking: Reported on 3/28/2024) 12 capsule 0     brexpiprazole (REXULTI) 1 MG tablet Take 1 mg by mouth at bedtime (Patient not taking: Reported on 3/28/2024)       cetirizine (ZYRTEC) 10 MG tablet Take 1 tablet (10 mg) by mouth daily 30 tablet 0     cyclobenzaprine (FLEXERIL) 10 MG tablet Take 1 tablet (10 mg) by mouth 3 times daily as needed for muscle spasms (Patient not taking: Reported on 3/28/2024) 20 tablet 0     fluocinonide (LIDEX) 0.05 % external cream Apply 1 Application topically 2 times daily as needed Apply thinly to knee 2 times daily, Monday through Fridays, off on weekends as needed. Avoid face and skin folds. (Patient not taking: Reported on 12/1/2023)       furosemide (LASIX) 20 MG tablet Take 20 mg by mouth daily       pregabalin (LYRICA) 100 MG capsule Take 200 mg by mouth at bedtime       pseudoePHEDrine (SUDAFED) 60 MG  tablet Take 1 tablet (60 mg) by mouth every 4 hours as needed for congestion (Patient not taking: Reported on 3/28/2024) 20 tablet 0     Respiratory Therapy Supplies (NEBULIZER) BRENDAN Nebulizer device.  Albuterol nebulization every 2 hours as needed for shortness of breath or wheezing. 1 each 0     SUMAtriptan (IMITREX) 25 MG tablet Take 25 mg by mouth at onset of headache for migraine May repeat in 2 hours. (Patient not taking: Reported on 3/28/2024)             1/5/2024     1:00 PM   Weight Loss Medication History Reviewed With Patient   Which weight loss medications are you currently taking on a regular basis? Wegovy   Are you having any side effects from the weight loss medication that we have prescribed you? No       Admission on 03/13/2024, Discharged on 03/14/2024   Component Date Value Ref Range Status     Sodium 03/13/2024 139  135 - 145 mmol/L Final    Reference intervals for this test were updated on 09/26/2023 to more accurately reflect our healthy population. There may be differences in the flagging of prior results with similar values performed with this method. Interpretation of those prior results can be made in the context of the updated reference intervals.      Potassium 03/13/2024 3.5  3.4 - 5.3 mmol/L Final     Chloride 03/13/2024 104  98 - 107 mmol/L Final     Carbon Dioxide (CO2) 03/13/2024 25  22 - 29 mmol/L Final     Anion Gap 03/13/2024 10  7 - 15 mmol/L Final     Urea Nitrogen 03/13/2024 7.4  6.0 - 20.0 mg/dL Final     Creatinine 03/13/2024 0.70  0.51 - 0.95 mg/dL Final     GFR Estimate 03/13/2024 >90  >60 mL/min/1.73m2 Final     Calcium 03/13/2024 9.3  8.6 - 10.0 mg/dL Final     Glucose 03/13/2024 108 (H)  70 - 99 mg/dL Final     Troponin T, High Sensitivity 03/13/2024 11  <=14 ng/L Final    Either a High Sensitivity Troponin T baseline (0 hours) value = 100 ng/L, or an increase in High Sensitivity Troponin T = 7 ng/L at 2 hours compared to 0 hours (2-0 hours), suggests myocardial injury,  and urgent clinical attention is required.    If the 2-0 hours increase is <7 ng/L, a High Sensitivity Troponin T result above gender-specific reference ranges warrants further evaluation.   Recommendations for further evaluation include correlation with clinical decision-making tool (e.g., HEART), a 3rd High Sensitivity Troponin T test 2 hours after the 2nd (a 20% change from baseline would represent concern), admission for observation, close PCC/cardiology follow-up, or urgent outpatient provocative testing.     D-Dimer Quantitative 03/13/2024 0.30  0.00 - 0.50 ug/mL FEU Final     Ventricular Rate 03/13/2024 100  BPM Final     Atrial Rate 03/13/2024 100  BPM Final     GA Interval 03/13/2024 150  ms Final     QRS Duration 03/13/2024 70  ms Final     QT 03/13/2024 324  ms Final     QTc 03/13/2024 417  ms Final     P Axis 03/13/2024 36  degrees Final     R AXIS 03/13/2024 59  degrees Final     T Axis 03/13/2024 12  degrees Final     Interpretation ECG 03/13/2024    Final                    Value:Sinus rhythm  Cannot rule out Anterior infarct (cited on or before 23-SEP-2023)  Abnormal ECG  When compared with ECG of 02-JAN-2024 05:28,  Questionable change in initial forces of Anterior leads  Confirmed by - EMERGENCY ROOM, PHYSICIAN ([id number]),  GIRMA COCHRAN (Eddie) on 3/14/2024 8:00:08 AM       WBC Count 03/13/2024 10.4  4.0 - 11.0 10e3/uL Final     RBC Count 03/13/2024 4.41  3.80 - 5.20 10e6/uL Final     Hemoglobin 03/13/2024 13.7  11.7 - 15.7 g/dL Final     Hematocrit 03/13/2024 40.5  35.0 - 47.0 % Final     MCV 03/13/2024 92  78 - 100 fL Final     MCH 03/13/2024 31.1  26.5 - 33.0 pg Final     MCHC 03/13/2024 33.8  31.5 - 36.5 g/dL Final     RDW 03/13/2024 12.9  10.0 - 15.0 % Final     Platelet Count 03/13/2024 302  150 - 450 10e3/uL Final     % Neutrophils 03/13/2024 74  % Final     % Lymphocytes 03/13/2024 18  % Final     % Monocytes 03/13/2024 7  % Final     % Eosinophils 03/13/2024 1  % Final     % Basophils  03/13/2024 0  % Final     % Immature Granulocytes 03/13/2024 0  % Final     NRBCs per 100 WBC 03/13/2024 0  <1 /100 Final     Absolute Neutrophils 03/13/2024 7.6  1.6 - 8.3 10e3/uL Final     Absolute Lymphocytes 03/13/2024 1.9  0.8 - 5.3 10e3/uL Final     Absolute Monocytes 03/13/2024 0.7  0.0 - 1.3 10e3/uL Final     Absolute Eosinophils 03/13/2024 0.1  0.0 - 0.7 10e3/uL Final     Absolute Basophils 03/13/2024 0.0  0.0 - 0.2 10e3/uL Final     Absolute Immature Granulocytes 03/13/2024 0.0  <=0.4 10e3/uL Final     Absolute NRBCs 03/13/2024 0.0  10e3/uL Final     Hold Specimen 03/13/2024 JIC   Final     Lipase 03/13/2024 37  13 - 60 U/L Final     Protein Total 03/13/2024 7.5  6.4 - 8.3 g/dL Final     Albumin 03/13/2024 4.5  3.5 - 5.2 g/dL Final     Bilirubin Total 03/13/2024 0.2  <=1.2 mg/dL Final     Alkaline Phosphatase 03/13/2024 93  40 - 150 U/L Final    Reference intervals for this test were updated on 11/14/2023 to more accurately reflect our healthy population. There may be differences in the flagging of prior results with similar values performed with this method. Interpretation of those prior results can be made in the context of the updated reference intervals.     AST 03/13/2024 19  0 - 45 U/L Final    Reference intervals for this test were updated on 6/12/2023 to more accurately reflect our healthy population. There may be differences in the flagging of prior results with similar values performed with this method. Interpretation of those prior results can be made in the context of the updated reference intervals.     ALT 03/13/2024 34  0 - 50 U/L Final    Reference intervals for this test were updated on 6/12/2023 to more accurately reflect our healthy population. There may be differences in the flagging of prior results with similar values performed with this method. Interpretation of those prior results can be made in the context of the updated reference intervals.       Bilirubin Direct 03/13/2024 <0.20   "0.00 - 0.30 mg/dL Final     Troponin T, High Sensitivity 03/13/2024 17 (H)  <=14 ng/L Final    Either a High Sensitivity Troponin T baseline (0 hours) value = 100 ng/L, or an increase in High Sensitivity Troponin T = 7 ng/L at 2 hours compared to 0 hours (2-0 hours), suggests myocardial injury, and urgent clinical attention is required.    If the 2-0 hours increase is <7 ng/L, a High Sensitivity Troponin T result above gender-specific reference ranges warrants further evaluation.   Recommendations for further evaluation include correlation with clinical decision-making tool (e.g., HEART), a 3rd High Sensitivity Troponin T test 2 hours after the 2nd (a 20% change from baseline would represent concern), admission for observation, close PCC/cardiology follow-up, or urgent outpatient provocative testing.     Troponin T, High Sensitivity 03/14/2024 10  <=14 ng/L Final    Either a High Sensitivity Troponin T baseline (0 hours) value = 100 ng/L, or an increase in High Sensitivity Troponin T = 7 ng/L at 2 hours compared to 0 hours (2-0 hours), suggests myocardial injury, and urgent clinical attention is required.    If the 2-0 hours increase is <7 ng/L, a High Sensitivity Troponin T result above gender-specific reference ranges warrants further evaluation.   Recommendations for further evaluation include correlation with clinical decision-making tool (e.g., HEART), a 3rd High Sensitivity Troponin T test 2 hours after the 2nd (a 20% change from baseline would represent concern), admission for observation, close PCC/cardiology follow-up, or urgent outpatient provocative testing.           9/13/2023    10:26 AM   BRIAN Score (Last Two)   BRIAN Raw Score 37   Activation Score 79.2   BRIAN Level 4         PHYSICAL EXAM:  Objective    Ht 1.575 m (5' 2\")   Wt 113.2 kg (249 lb 9.6 oz)   LMP 07/12/2023   BMI 45.65 kg/m      Vitals - Patient Reported  Pain Score: Severe Pain (7)  Pain Loc: Abdomen        GENERAL: alert and no " distress  EYES: Eyes grossly normal to inspection.  No discharge or erythema, or obvious scleral/conjunctival abnormalities.  RESP: No audible wheeze, cough, or visible cyanosis.    SKIN: Visible skin clear. No significant rash, abnormal pigmentation or lesions.  NEURO: Cranial nerves grossly intact.  Mentation and speech appropriate for age.  PSYCH: Appropriate affect, tone, and pace of words        Sincerely,    Sharon Toro NP      45 minutes spent by me on the date of the encounter doing chart review, history and exam, documentation and further activities per the note    Virtual Visit Details    Type of service:  Video Visit   Video Start Time: 9:33 AM  Video End Time:10:13 AM    Originating Location (pt. Location): Home    Distant Location (provider location):  Off-site  Platform used for Video Visit: Oscar

## 2024-03-28 NOTE — LETTER
3/28/2024       RE: Nevin Alvarado  [address]  [facility] [place] [address]     Dear Colleague,    Thank you for referring your patient, Nevin Alvarado, to the [facility]. Please see a copy of my visit note below.      Return Medical Weight Management Note     Nevin Alvarado  MRN:  [id number]  :  1990  MOR:  3/28/2024    Dear Latonya Knight MD,    I had the pleasure of seeing your patient Nevin Alvarado. She is a 33 year old female who I am continuing to see for treatment of obesity related to:        2023    12:48 PM   --   I have the following health issues associated with obesity Type II Diabetes    High Blood Pressure    Sleep Apnea    Polycystic Ovarian Syndrome    GERD (Reflux)    Fatty Liver    Asthma    Stress Incontinence       Assessment & Plan   Problem List Items Addressed This Visit          Digestive    Class 3 severe obesity with serious comorbidity and body mass index (BMI) of 50.0 to 59.9 in adult, unspecified obesity type (H) - Primary     Mostly tolerating wegovy 1mg. Concern for inadequate nutrition/ hydration since worsening abdominal pain. Has been seen [place] and has endoscopy scheduled for tomorrow. While not clearly caused by wegovy, abdominal pain likely compounded by worsening constipation secondary to wegovy. Of note, metformin was stopped in December by PCP and constipation has gotten worse since that time as well. Discussed needing to be aggressive with bowel management with wegovy. Discussed needing to be intentional about nutrition and hydration while taking wegovy also. May need to consider dose reduction if symptoms don't improve. Availability of 0.5mg is low so this is impacting decision making. Patient comfortable with being more aggressive with bowel management, hydration and protein. Focus more on hydration as diet recall  sounds okay. Will follow up in a few weeks.     Patient using freestyle pamela but noticing more variability in the last 2-3 weeks in blood sugars. More recently since she changed the last sensor in the ED 3/22. Question if needs new sensor. But, variability may be secondary to her being off GLp1 for EGD- varying from . Her last A1C was 5.6 but that was while taking wegovy and off metformin x 3 months. A1C was 5.4 while taking metformin. Will continue to monitor. Patient will have EGD tomorrow and ask to compare her sensor to blood glucose there.     Restart wegovy 1mg tomorrow   Hold wegovy before hysterectomy- last dose may 5th   Talk to cadi  about ensure protein max 30g protein- need high protein diet   Take miralax daily   Take metamucil every other day until going well then increase to daily   Can use glycerine or fleets enema if having difficulty passing stool   Check blood sugar accuracy with hospital tomorrow with pamela - consider switching probes for pamela   Add electrolytes - push fluids - goal is 64oz daily   Focus on protein first at meals   Follow up 3 months          Relevant Medications    sodium phosphate (FLEET ENEMA) 7-19 GM/118ML rectal enema    Semaglutide-Weight Management (WEGOVY) 1 MG/0.5ML pen    Constipation, unspecified constipation type    Relevant Medications    polyethylene glycol (MIRALAX) 17 GM/Dose powder    PSYLLIUM PO    sodium phosphate (FLEET ENEMA) 7-19 GM/118ML rectal enema          INTERVAL HISTORY:    5day course of prednisone 3-4 weeks ago - blood sugar spiked a couple days after being on prednisone. Pamela was accurate 3/22 in ED by comparison to labs in ED but had to change sensors that day and     Excruciating pain and nausea with eating - a little while after eating - associated with chest pain/pressure like band- has been worked up in the ED which was always normal - pain improves several hours after eating. Has episodes where she wakes up feeling fine  "other days feels dizzy/ light headed. has EGD schedule     Anti-obesity medication history    Current:   wegovy 1mg - has been off x 2 weeks because of needing EGD tomorrow     Failed:   Metformin - stopped by PCP12/2023  when starting wegvoy because A1C was \"good\" (5.4)     Recent diet changes:   Since having this pain she has been naturally eating less   Still Can eat 4 inch sub with meals   Has gotten pain after eating just a pudding cup   Pain with drinking protein shake   Still trying to eat at each meal   Water intake has gone down     Recent stressors:   Had been sick with URI much of Dec/ Jan - feeling better from that     Recent sleep changes: okay     Vitamins/Labs:   Using freestyle cy     Constipation: going weeks without bowel movements - seen in ED for impaction. Taking miralax and metamucil some days. Now, having BM every couple days and firmer BM     CURRENT WEIGHT:   249 lbs 9.6 oz    Initial Weight (lbs): 309 lbs  Last Visits Weight: 117.5 kg (259 lb)  Cumulative weight loss (lbs): 59.4  Weight Loss Percentage: 19.22%        1/5/2024     1:00 PM   Changes and Difficulties   I have made the following changes to my diet since my last visit: been sick, so no appetite   I have made the following changes to my activity/exercise since my last visit: none         MEDICATIONS:   Current Outpatient Medications   Medication Sig Dispense Refill    acetaminophen (TYLENOL) 500 MG tablet Take 2 tablets (1,000 mg) by mouth every 6 hours as needed for pain or fever 60 tablet 0    albuterol (PROAIR HFA/PROVENTIL HFA/VENTOLIN HFA) 108 (90 Base) MCG/ACT inhaler Inhale 2 puffs into the lungs every 6 hours as needed for shortness of breath / dyspnea or wheezing 18 g 0    albuterol (PROVENTIL) (2.5 MG/3ML) 0.083% neb solution Take 1 vial (2.5 mg) by nebulization every 6 hours as needed for shortness of breath or wheezing 90 mL 0    Cholecalciferol (D3 HIGH POTENCY) 25 MCG (1000 UT) CAPS Take 50 mcg by mouth daily   "    clonazePAM (KLONOPIN) 0.5 MG tablet Take 0.5mg daily PRN anxiety- 20 tablets per month, try to keep it to 3-4x per week      ferrous sulfate (FEROSUL) 325 (65 Fe) MG tablet Take 1 tablet (325 mg) by mouth daily (with breakfast) 30 tablet 0    montelukast (SINGULAIR) 10 MG tablet Take 10 mg by mouth every evening      norethindrone (AYGESTIN) 5 MG tablet Take 5 mg by mouth daily      ondansetron (ZOFRAN-ODT) 4 MG ODT tab Take 1 tablet (4 mg) by mouth every 8 hours as needed for nausea 15 tablet 0    pantoprazole (PROTONIX) 40 MG EC tablet Take 40 mg by mouth      polyethylene glycol (MIRALAX) 17 GM/Dose powder Take 17 g by mouth      pregabalin (LYRICA) 100 MG capsule Take 100 mg by mouth every morning      PSYLLIUM PO Take 1 tsp by mouth      Semaglutide-Weight Management (WEGOVY) 1 MG/0.5ML pen Inject 1 mg Subcutaneous once a week 2 mL 3    sodium phosphate (FLEET ENEMA) 7-19 GM/118ML rectal enema Place 1 Bottle (1 enema) rectally once for 1 dose 133 mL 0    valACYclovir (VALTREX) 1000 mg tablet Take 2,000 mg by mouth 2 times daily as needed Take 2 tablets by mouth two times daily for one day. Use as needed at onset of cold sore.      BANOPHEN 2-0.1 % external cream Apply 1 applicator topically 2 times daily as needed for itching (Patient not taking: Reported on 12/1/2023)      benzonatate (TESSALON) 100 MG capsule Take 1 capsule (100 mg) by mouth 3 times daily as needed for cough (Patient not taking: Reported on 3/28/2024) 12 capsule 0    brexpiprazole (REXULTI) 1 MG tablet Take 1 mg by mouth at bedtime (Patient not taking: Reported on 3/28/2024)      cetirizine (ZYRTEC) 10 MG tablet Take 1 tablet (10 mg) by mouth daily 30 tablet 0    cyclobenzaprine (FLEXERIL) 10 MG tablet Take 1 tablet (10 mg) by mouth 3 times daily as needed for muscle spasms (Patient not taking: Reported on 3/28/2024) 20 tablet 0    fluocinonide (LIDEX) 0.05 % external cream Apply 1 Application topically 2 times daily as needed Apply thinly  to knee 2 times daily, Monday through Fridays, off on weekends as needed. Avoid face and skin folds. (Patient not taking: Reported on 12/1/2023)      furosemide (LASIX) 20 MG tablet Take 20 mg by mouth daily      pregabalin (LYRICA) 100 MG capsule Take 200 mg by mouth at bedtime      pseudoePHEDrine (SUDAFED) 60 MG tablet Take 1 tablet (60 mg) by mouth every 4 hours as needed for congestion (Patient not taking: Reported on 3/28/2024) 20 tablet 0    Respiratory Therapy Supplies (NEBULIZER) BRENDAN Nebulizer device.  Albuterol nebulization every 2 hours as needed for shortness of breath or wheezing. 1 each 0    SUMAtriptan (IMITREX) 25 MG tablet Take 25 mg by mouth at onset of headache for migraine May repeat in 2 hours. (Patient not taking: Reported on 3/28/2024)             1/5/2024     1:00 PM   Weight Loss Medication History Reviewed With Patient   Which weight loss medications are you currently taking on a regular basis? Wegovy   Are you having any side effects from the weight loss medication that we have prescribed you? No       Admission on 03/13/2024, Discharged on 03/14/2024   Component Date Value Ref Range Status    Sodium 03/13/2024 139  135 - 145 mmol/L Final    Reference intervals for this test were updated on 09/26/2023 to more accurately reflect our healthy population. There may be differences in the flagging of prior results with similar values performed with this method. Interpretation of those prior results can be made in the context of the updated reference intervals.     Potassium 03/13/2024 3.5  3.4 - 5.3 mmol/L Final    Chloride 03/13/2024 104  98 - 107 mmol/L Final    Carbon Dioxide (CO2) 03/13/2024 25  22 - 29 mmol/L Final    Anion Gap 03/13/2024 10  7 - 15 mmol/L Final    Urea Nitrogen 03/13/2024 7.4  6.0 - 20.0 mg/dL Final    Creatinine 03/13/2024 0.70  0.51 - 0.95 mg/dL Final    GFR Estimate 03/13/2024 >90  >60 mL/min/1.73m2 Final    Calcium 03/13/2024 9.3  8.6 - 10.0 mg/dL Final    Glucose  03/13/2024 108 (H)  70 - 99 mg/dL Final    Troponin T, High Sensitivity 03/13/2024 11  <=14 ng/L Final    Either a High Sensitivity Troponin T baseline (0 hours) value = 100 ng/L, or an increase in High Sensitivity Troponin T = 7 ng/L at 2 hours compared to 0 hours (2-0 hours), suggests myocardial injury, and urgent clinical attention is required.    If the 2-0 hours increase is <7 ng/L, a High Sensitivity Troponin T result above gender-specific reference ranges warrants further evaluation.   Recommendations for further evaluation include correlation with clinical decision-making tool (e.g., HEART), a 3rd High Sensitivity Troponin T test 2 hours after the 2nd (a 20% change from baseline would represent concern), admission for observation, close PCC/cardiology follow-up, or urgent outpatient provocative testing.    D-Dimer Quantitative 03/13/2024 0.30  0.00 - 0.50 ug/mL FEU Final    Ventricular Rate 03/13/2024 100  BPM Final    Atrial Rate 03/13/2024 100  BPM Final    VT Interval 03/13/2024 150  ms Final    QRS Duration 03/13/2024 70  ms Final    QT 03/13/2024 324  ms Final    QTc 03/13/2024 417  ms Final    P Axis 03/13/2024 36  degrees Final    R AXIS 03/13/2024 59  degrees Final    T Axis 03/13/2024 12  degrees Final    Interpretation ECG 03/13/2024    Final                    Value:Sinus rhythm  Cannot rule out Anterior infarct (cited on or before 23-SEP-2023)  Abnormal ECG  When compared with ECG of 02-JAN-2024 05:28,  Questionable change in initial forces of Anterior leads  Confirmed by - EMERGENCY ROOM, PHYSICIAN ([id number]),  GIRMA COCHRAN (Eddie) on 3/14/2024 8:00:08 AM      WBC Count 03/13/2024 10.4  4.0 - 11.0 10e3/uL Final    RBC Count 03/13/2024 4.41  3.80 - 5.20 10e6/uL Final    Hemoglobin 03/13/2024 13.7  11.7 - 15.7 g/dL Final    Hematocrit 03/13/2024 40.5  35.0 - 47.0 % Final    MCV 03/13/2024 92  78 - 100 fL Final    MCH 03/13/2024 31.1  26.5 - 33.0 pg Final    [facility] 03/13/2024 33.8  31.5 - 36.5  g/dL Final    RDW 03/13/2024 12.9  10.0 - 15.0 % Final    Platelet Count 03/13/2024 302  150 - 450 10e3/uL Final    % Neutrophils 03/13/2024 74  % Final    % Lymphocytes 03/13/2024 18  % Final    % Monocytes 03/13/2024 7  % Final    % Eosinophils 03/13/2024 1  % Final    % Basophils 03/13/2024 0  % Final    % Immature Granulocytes 03/13/2024 0  % Final    NRBCs per 100 WBC 03/13/2024 0  <1 /100 Final    Absolute Neutrophils 03/13/2024 7.6  1.6 - 8.3 10e3/uL Final    Absolute Lymphocytes 03/13/2024 1.9  0.8 - 5.3 10e3/uL Final    Absolute Monocytes 03/13/2024 0.7  0.0 - 1.3 10e3/uL Final    Absolute Eosinophils 03/13/2024 0.1  0.0 - 0.7 10e3/uL Final    Absolute Basophils 03/13/2024 0.0  0.0 - 0.2 10e3/uL Final    Absolute Immature Granulocytes 03/13/2024 0.0  <=0.4 10e3/uL Final    Absolute NRBCs 03/13/2024 0.0  10e3/uL Final    Hold Specimen 03/13/2024 JI   Final    Lipase 03/13/2024 37  13 - 60 U/L Final    Protein Total 03/13/2024 7.5  6.4 - 8.3 g/dL Final    Albumin 03/13/2024 4.5  3.5 - 5.2 g/dL Final    Bilirubin Total 03/13/2024 0.2  <=1.2 mg/dL Final    Alkaline Phosphatase 03/13/2024 93  40 - 150 U/L Final    Reference intervals for this test were updated on 11/14/2023 to more accurately reflect our healthy population. There may be differences in the flagging of prior results with similar values performed with this method. Interpretation of those prior results can be made in the context of the updated reference intervals.    AST 03/13/2024 19  0 - 45 U/L Final    Reference intervals for this test were updated on 6/12/2023 to more accurately reflect our healthy population. There may be differences in the flagging of prior results with similar values performed with this method. Interpretation of those prior results can be made in the context of the updated reference intervals.    ALT 03/13/2024 34  0 - 50 U/L Final    Reference intervals for this test were updated on 6/12/2023 to more accurately reflect our  healthy population. There may be differences in the flagging of prior results with similar values performed with this method. Interpretation of those prior results can be made in the context of the updated reference intervals.      Bilirubin Direct 03/13/2024 <0.20  0.00 - 0.30 mg/dL Final    Troponin T, High Sensitivity 03/13/2024 17 (H)  <=14 ng/L Final    Either a High Sensitivity Troponin T baseline (0 hours) value = 100 ng/L, or an increase in High Sensitivity Troponin T = 7 ng/L at 2 hours compared to 0 hours (2-0 hours), suggests myocardial injury, and urgent clinical attention is required.    If the 2-0 hours increase is <7 ng/L, a High Sensitivity Troponin T result above gender-specific reference ranges warrants further evaluation.   Recommendations for further evaluation include correlation with clinical decision-making tool (e.g., HEART), a 3rd High Sensitivity Troponin T test 2 hours after the 2nd (a 20% change from baseline would represent concern), admission for observation, close PCC/cardiology follow-up, or urgent outpatient provocative testing.    Troponin T, High Sensitivity 03/14/2024 10  <=14 ng/L Final    Either a High Sensitivity Troponin T baseline (0 hours) value = 100 ng/L, or an increase in High Sensitivity Troponin T = 7 ng/L at 2 hours compared to 0 hours (2-0 hours), suggests myocardial injury, and urgent clinical attention is required.    If the 2-0 hours increase is <7 ng/L, a High Sensitivity Troponin T result above gender-specific reference ranges warrants further evaluation.   Recommendations for further evaluation include correlation with clinical decision-making tool (e.g., HEART), a 3rd High Sensitivity Troponin T test 2 hours after the 2nd (a 20% change from baseline would represent concern), admission for observation, close PCC/cardiology follow-up, or urgent outpatient provocative testing.           9/13/2023    10:26 AM   BRIAN Score (Last Two)   BRIAN Raw Score 37   Activation  "Score 79.2   BRIAN Level 4         PHYSICAL EXAM:  Objective    Ht 1.575 m (5' 2\")   Wt 113.2 kg (249 lb 9.6 oz)   LMP 07/12/2023   BMI 45.65 kg/m      Vitals - Patient Reported  Pain Score: Severe Pain (7)  Pain Loc: Abdomen        GENERAL: alert and no distress  EYES: Eyes grossly normal to inspection.  No discharge or erythema, or obvious scleral/conjunctival abnormalities.  RESP: No audible wheeze, cough, or visible cyanosis.    SKIN: Visible skin clear. No significant rash, abnormal pigmentation or lesions.  NEURO: Cranial nerves grossly intact.  Mentation and speech appropriate for age.  PSYCH: Appropriate affect, tone, and pace of words        Sincerely,    Sharon Toro NP      45 minutes spent by me on the date of the encounter doing chart review, history and exam, documentation and further activities per the note    Virtual Visit Details    Type of service:  Video Visit   Video Start Time: 9:33 AM  Video End Time:10:13 AM    Originating Location (pt. Location): Home    Distant Location (provider location):  Off-site  Platform used for Video Visit: Thuy"

## 2024-03-28 NOTE — ASSESSMENT & PLAN NOTE
Mostly tolerating wegovy 1mg. Concern for inadequate nutrition/ hydration since worsening abdominal pain. Has been seen [facility] and has endoscopy scheduled for tomorrow. While not clearly caused by wegovy, abdominal pain likely compounded by worsening constipation secondary to wegovy. Of note, metformin was stopped in December by PCP and constipation has gotten worse since that time as well. Discussed needing to be aggressive with bowel management with wegovy. Discussed needing to be intentional about nutrition and hydration while taking wegovy also. May need to consider dose reduction if symptoms don't improve. Availability of 0.5mg is low so this is impacting decision making. Patient comfortable with being more aggressive with bowel management, hydration and protein. Focus more on hydration as diet recall sounds okay. Will follow up in a few weeks.     Patient using freestyle cy but noticing more variability in the last 2-3 weeks in blood sugars. More recently since she changed the last sensor in the ED 3/22. Question if needs new sensor. But, variability may be secondary to her being off GLp1 for EGD- varying from . Her last A1C was 5.6 but that was while taking wegovy and off metformin x 3 months. A1C was 5.4 while taking metformin. Will continue to monitor. Patient will have EGD tomorrow and ask to compare her sensor to blood glucose there.     Restart wegovy 1mg tomorrow   Hold wegovy before hysterectomy- last dose may 5th   Talk to cadi  about ensure protein max 30g protein- need high protein diet   Take miralax daily   Take metamucil every other day until going well then increase to daily   Can use glycerine or fleets enema if having difficulty passing stool   Check blood sugar accuracy with hospital tomorrow with cy - consider switching probes for cy   Add electrolytes - push fluids - goal is 64oz daily   Focus on protein first at meals   Follow up 3 months

## 2024-03-28 NOTE — NURSING NOTE
Is the patient currently in the state of [place]? YES    Visit mode:VIDEO    If the visit is dropped, the patient can be reconnected by: VIDEO VISIT: Text to cell phone:   Telephone Information:   Mobile [phone]    and VIDEO VISIT: Send to e-mail at: [email or web address]    Will anyone else be joining the visit? NO  (If patient encounters technical issues they should call [phone])    How would you like to obtain your AVS? MyChart    Are changes needed to the allergy or medication list? No    Reason for visit: RECHECK    Pt stated some of the QNR questions did not pertain to her since she did not have bariatric surgery    Rosa ACOSTAF

## 2024-03-29 ENCOUNTER — ANESTHESIA (OUTPATIENT)
Dept: SURGERY | Facility: CLINIC | Age: 34
End: 2024-03-29
Payer: COMMERCIAL

## 2024-03-29 ENCOUNTER — HOSPITAL ENCOUNTER (EMERGENCY)
Facility: CLINIC | Age: 34
Discharge: LEFT BEFORE DISCHARGE | End: 2024-03-29
Attending: EMERGENCY MEDICINE | Admitting: EMERGENCY MEDICINE
Payer: COMMERCIAL

## 2024-03-29 ENCOUNTER — ANESTHESIA EVENT (OUTPATIENT)
Dept: SURGERY | Facility: CLINIC | Age: 34
End: 2024-03-29
Payer: COMMERCIAL

## 2024-03-29 ENCOUNTER — HOSPITAL ENCOUNTER (OUTPATIENT)
Facility: CLINIC | Age: 34
Discharge: HOME | End: 2024-03-29
Attending: INTERNAL MEDICINE | Admitting: INTERNAL MEDICINE
Payer: COMMERCIAL

## 2024-03-29 ENCOUNTER — DOCUMENTATION ONLY (OUTPATIENT)
Dept: OTHER | Facility: CLINIC | Age: 34
End: 2024-03-29

## 2024-03-29 VITALS
HEIGHT: 62 IN | BODY MASS INDEX: 45.82 KG/M2 | HEART RATE: 88 BPM | TEMPERATURE: 98.4 F | WEIGHT: 249 LBS | DIASTOLIC BLOOD PRESSURE: 89 MMHG | OXYGEN SATURATION: 97 % | SYSTOLIC BLOOD PRESSURE: 147 MMHG | RESPIRATION RATE: 31 BRPM

## 2024-03-29 VITALS
DIASTOLIC BLOOD PRESSURE: 85 MMHG | TEMPERATURE: 97.6 F | WEIGHT: 249 LBS | RESPIRATION RATE: 22 BRPM | OXYGEN SATURATION: 96 % | BODY MASS INDEX: 45.82 KG/M2 | HEART RATE: 85 BPM | SYSTOLIC BLOOD PRESSURE: 160 MMHG | HEIGHT: 62 IN

## 2024-03-29 DIAGNOSIS — R10.9 ABDOMINAL PAIN: ICD-10-CM

## 2024-03-29 LAB
ALBUMIN SERPL BCG-MCNC: 4.4 G/DL (ref 3.5–5.2)
ALP SERPL-CCNC: 82 U/L (ref 40–150)
ALT SERPL W P-5'-P-CCNC: 26 U/L (ref 0–50)
ANION GAP SERPL CALCULATED.3IONS-SCNC: 9 MMOL/L (ref 7–15)
AST SERPL W P-5'-P-CCNC: 17 U/L (ref 0–45)
BASOPHILS # BLD AUTO: 0 10E3/UL (ref 0–0.2)
BASOPHILS NFR BLD AUTO: 0 %
BILIRUB SERPL-MCNC: 0.3 MG/DL
BUN SERPL-MCNC: 8.7 MG/DL (ref 6–20)
CALCIUM SERPL-MCNC: 9.6 MG/DL (ref 8.6–10)
CHLORIDE SERPL-SCNC: 107 MMOL/L (ref 98–107)
CREAT SERPL-MCNC: 0.7 MG/DL (ref 0.51–0.95)
DEPRECATED HCO3 PLAS-SCNC: 26 MMOL/L (ref 22–29)
EGFRCR SERPLBLD CKD-EPI 2021: >90 ML/MIN/1.73M2
EOSINOPHIL # BLD AUTO: 0.1 10E3/UL (ref 0–0.7)
EOSINOPHIL NFR BLD AUTO: 0 %
ERYTHROCYTE [DISTWIDTH] IN BLOOD BY AUTOMATED COUNT: 12.9 % (ref 10–15)
GLUCOSE BLDC GLUCOMTR-MCNC: 102 MG/DL (ref 70–99)
GLUCOSE SERPL-MCNC: 119 MG/DL (ref 70–99)
HCT VFR BLD AUTO: 42.8 % (ref 35–47)
HGB BLD-MCNC: 14.4 G/DL (ref 11.7–15.7)
IMM GRANULOCYTES # BLD: 0 10E3/UL
IMM GRANULOCYTES NFR BLD: 0 %
LIPASE SERPL-CCNC: 43 U/L (ref 13–60)
LYMPHOCYTES # BLD AUTO: 0.8 10E3/UL (ref 0.8–5.3)
LYMPHOCYTES NFR BLD AUTO: 7 %
MCH RBC QN AUTO: 30.2 PG (ref 26.5–33)
MCHC RBC AUTO-ENTMCNC: 33.6 G/DL (ref 31.5–36.5)
MCV RBC AUTO: 90 FL (ref 78–100)
MONOCYTES # BLD AUTO: 0.1 10E3/UL (ref 0–1.3)
MONOCYTES NFR BLD AUTO: 1 %
NEUTROPHILS # BLD AUTO: 10.2 10E3/UL (ref 1.6–8.3)
NEUTROPHILS NFR BLD AUTO: 91 %
NRBC # BLD AUTO: 0 10E3/UL
NRBC BLD AUTO-RTO: 0 /100
PLATELET # BLD AUTO: 294 10E3/UL (ref 150–450)
POTASSIUM SERPL-SCNC: 4.2 MMOL/L (ref 3.4–5.3)
PROT SERPL-MCNC: 7.2 G/DL (ref 6.4–8.3)
RBC # BLD AUTO: 4.77 10E6/UL (ref 3.8–5.2)
SODIUM SERPL-SCNC: 142 MMOL/L (ref 135–145)
UPPER GI ENDOSCOPY: NORMAL
WBC # BLD AUTO: 11.2 10E3/UL (ref 4–11)

## 2024-03-29 PROCEDURE — 80053 COMPREHEN METABOLIC PANEL: CPT | Performed by: EMERGENCY MEDICINE

## 2024-03-29 PROCEDURE — 250N000009 HC RX 250: Performed by: INTERNAL MEDICINE

## 2024-03-29 PROCEDURE — 88305 TISSUE EXAM BY PATHOLOGIST: CPT | Mod: 26 | Performed by: PATHOLOGY

## 2024-03-29 PROCEDURE — 250N000011 HC RX IP 250 OP 636: Performed by: NURSE ANESTHETIST, CERTIFIED REGISTERED

## 2024-03-29 PROCEDURE — 370N000017 HC ANESTHESIA TECHNICAL FEE, PER MIN: Performed by: INTERNAL MEDICINE

## 2024-03-29 PROCEDURE — 96375 TX/PRO/DX INJ NEW DRUG ADDON: CPT

## 2024-03-29 PROCEDURE — 88342 IMHCHEM/IMCYTCHM 1ST ANTB: CPT | Mod: TC | Performed by: INTERNAL MEDICINE

## 2024-03-29 PROCEDURE — 36415 COLL VENOUS BLD VENIPUNCTURE: CPT | Performed by: EMERGENCY MEDICINE

## 2024-03-29 PROCEDURE — 250N000011 HC RX IP 250 OP 636: Performed by: INTERNAL MEDICINE

## 2024-03-29 PROCEDURE — 272N000001 HC OR GENERAL SUPPLY STERILE: Performed by: INTERNAL MEDICINE

## 2024-03-29 PROCEDURE — 82962 GLUCOSE BLOOD TEST: CPT

## 2024-03-29 PROCEDURE — 999N000141 HC STATISTIC PRE-PROCEDURE NURSING ASSESSMENT: Performed by: INTERNAL MEDICINE

## 2024-03-29 PROCEDURE — 360N000075 HC SURGERY LEVEL 2, PER MIN: Performed by: INTERNAL MEDICINE

## 2024-03-29 PROCEDURE — 710N000010 HC RECOVERY PHASE 1, LEVEL 2, PER MIN: Performed by: INTERNAL MEDICINE

## 2024-03-29 PROCEDURE — 258N000003 HC RX IP 258 OP 636: Performed by: NURSE ANESTHETIST, CERTIFIED REGISTERED

## 2024-03-29 PROCEDURE — 99284 EMERGENCY DEPT VISIT MOD MDM: CPT | Mod: 25

## 2024-03-29 PROCEDURE — 96374 THER/PROPH/DIAG INJ IV PUSH: CPT

## 2024-03-29 PROCEDURE — 250N000009 HC RX 250: Performed by: NURSE ANESTHETIST, CERTIFIED REGISTERED

## 2024-03-29 PROCEDURE — 250N000011 HC RX IP 250 OP 636: Performed by: EMERGENCY MEDICINE

## 2024-03-29 PROCEDURE — 710N000012 HC RECOVERY PHASE 2, PER MINUTE: Performed by: INTERNAL MEDICINE

## 2024-03-29 PROCEDURE — 88342 IMHCHEM/IMCYTCHM 1ST ANTB: CPT | Mod: 26 | Performed by: PATHOLOGY

## 2024-03-29 PROCEDURE — 258N000003 HC RX IP 258 OP 636: Performed by: STUDENT IN AN ORGANIZED HEALTH CARE EDUCATION/TRAINING PROGRAM

## 2024-03-29 PROCEDURE — 83690 ASSAY OF LIPASE: CPT | Performed by: EMERGENCY MEDICINE

## 2024-03-29 PROCEDURE — 85004 AUTOMATED DIFF WBC COUNT: CPT | Performed by: EMERGENCY MEDICINE

## 2024-03-29 RX ORDER — ONDANSETRON 2 MG/ML
4 INJECTION INTRAMUSCULAR; INTRAVENOUS
Status: DISCONTINUED | OUTPATIENT
Start: 2024-03-29 | End: 2024-03-29 | Stop reason: HOSPADM

## 2024-03-29 RX ORDER — FLUMAZENIL 0.1 MG/ML
0.2 INJECTION, SOLUTION INTRAVENOUS
Status: DISCONTINUED | OUTPATIENT
Start: 2024-03-29 | End: 2024-03-29 | Stop reason: HOSPADM

## 2024-03-29 RX ORDER — LIDOCAINE 40 MG/G
CREAM TOPICAL
Status: DISCONTINUED | OUTPATIENT
Start: 2024-03-29 | End: 2024-03-29 | Stop reason: HOSPADM

## 2024-03-29 RX ORDER — NALOXONE HYDROCHLORIDE 0.4 MG/ML
0.2 INJECTION, SOLUTION INTRAMUSCULAR; INTRAVENOUS; SUBCUTANEOUS
Status: DISCONTINUED | OUTPATIENT
Start: 2024-03-29 | End: 2024-03-29 | Stop reason: HOSPADM

## 2024-03-29 RX ORDER — HYDROMORPHONE HYDROCHLORIDE 1 MG/ML
0.5 INJECTION, SOLUTION INTRAMUSCULAR; INTRAVENOUS; SUBCUTANEOUS ONCE
Status: DISCONTINUED | OUTPATIENT
Start: 2024-03-29 | End: 2024-03-29

## 2024-03-29 RX ORDER — ONDANSETRON 2 MG/ML
4 INJECTION INTRAMUSCULAR; INTRAVENOUS EVERY 30 MIN PRN
Status: DISCONTINUED | OUTPATIENT
Start: 2024-03-29 | End: 2024-03-29 | Stop reason: HOSPADM

## 2024-03-29 RX ORDER — NALOXONE HYDROCHLORIDE 0.4 MG/ML
0.4 INJECTION, SOLUTION INTRAMUSCULAR; INTRAVENOUS; SUBCUTANEOUS
Status: DISCONTINUED | OUTPATIENT
Start: 2024-03-29 | End: 2024-03-29 | Stop reason: HOSPADM

## 2024-03-29 RX ORDER — DEXAMETHASONE SODIUM PHOSPHATE 4 MG/ML
INJECTION, SOLUTION INTRA-ARTICULAR; INTRALESIONAL; INTRAMUSCULAR; INTRAVENOUS; SOFT TISSUE PRN
Status: DISCONTINUED | OUTPATIENT
Start: 2024-03-29 | End: 2024-03-29

## 2024-03-29 RX ORDER — DROPERIDOL 2.5 MG/ML
0.62 INJECTION, SOLUTION INTRAMUSCULAR; INTRAVENOUS ONCE
Status: DISCONTINUED | OUTPATIENT
Start: 2024-03-29 | End: 2024-03-29

## 2024-03-29 RX ORDER — ONDANSETRON 2 MG/ML
4 INJECTION INTRAMUSCULAR; INTRAVENOUS
Status: COMPLETED | OUTPATIENT
Start: 2024-03-29 | End: 2024-03-29

## 2024-03-29 RX ORDER — PROCHLORPERAZINE MALEATE 10 MG
10 TABLET ORAL EVERY 6 HOURS PRN
Status: DISCONTINUED | OUTPATIENT
Start: 2024-03-29 | End: 2024-03-29 | Stop reason: HOSPADM

## 2024-03-29 RX ORDER — KETOROLAC TROMETHAMINE 15 MG/ML
15 INJECTION, SOLUTION INTRAMUSCULAR; INTRAVENOUS ONCE
Status: COMPLETED | OUTPATIENT
Start: 2024-03-29 | End: 2024-03-29

## 2024-03-29 RX ORDER — ONDANSETRON 2 MG/ML
4 INJECTION INTRAMUSCULAR; INTRAVENOUS EVERY 6 HOURS PRN
Status: DISCONTINUED | OUTPATIENT
Start: 2024-03-29 | End: 2024-03-29 | Stop reason: HOSPADM

## 2024-03-29 RX ORDER — PROPOFOL 10 MG/ML
INJECTION, EMULSION INTRAVENOUS CONTINUOUS PRN
Status: DISCONTINUED | OUTPATIENT
Start: 2024-03-29 | End: 2024-03-29

## 2024-03-29 RX ORDER — FENTANYL CITRATE 50 UG/ML
25 INJECTION, SOLUTION INTRAMUSCULAR; INTRAVENOUS
Status: DISCONTINUED | OUTPATIENT
Start: 2024-03-29 | End: 2024-03-29 | Stop reason: HOSPADM

## 2024-03-29 RX ORDER — ONDANSETRON 2 MG/ML
INJECTION INTRAMUSCULAR; INTRAVENOUS PRN
Status: DISCONTINUED | OUTPATIENT
Start: 2024-03-29 | End: 2024-03-29

## 2024-03-29 RX ORDER — SODIUM CHLORIDE, SODIUM LACTATE, POTASSIUM CHLORIDE, CALCIUM CHLORIDE 600; 310; 30; 20 MG/100ML; MG/100ML; MG/100ML; MG/100ML
INJECTION, SOLUTION INTRAVENOUS CONTINUOUS
Status: DISCONTINUED | OUTPATIENT
Start: 2024-03-29 | End: 2024-03-29 | Stop reason: HOSPADM

## 2024-03-29 RX ORDER — ONDANSETRON 2 MG/ML
4 INJECTION INTRAMUSCULAR; INTRAVENOUS ONCE
Status: COMPLETED | OUTPATIENT
Start: 2024-03-29 | End: 2024-03-29

## 2024-03-29 RX ORDER — ONDANSETRON 4 MG/1
4 TABLET, ORALLY DISINTEGRATING ORAL EVERY 6 HOURS PRN
Status: DISCONTINUED | OUTPATIENT
Start: 2024-03-29 | End: 2024-03-29 | Stop reason: HOSPADM

## 2024-03-29 RX ORDER — NALOXONE HYDROCHLORIDE 0.4 MG/ML
0.1 INJECTION, SOLUTION INTRAMUSCULAR; INTRAVENOUS; SUBCUTANEOUS
Status: DISCONTINUED | OUTPATIENT
Start: 2024-03-29 | End: 2024-03-29 | Stop reason: HOSPADM

## 2024-03-29 RX ORDER — ONDANSETRON 4 MG/1
4 TABLET, ORALLY DISINTEGRATING ORAL EVERY 30 MIN PRN
Status: DISCONTINUED | OUTPATIENT
Start: 2024-03-29 | End: 2024-03-29 | Stop reason: HOSPADM

## 2024-03-29 RX ADMIN — ONDANSETRON 4 MG: 2 INJECTION INTRAMUSCULAR; INTRAVENOUS at 09:49

## 2024-03-29 RX ADMIN — LIDOCAINE HYDROCHLORIDE 50 MG: 10 INJECTION, SOLUTION EPIDURAL; INFILTRATION; INTRACAUDAL; PERINEURAL at 10:07

## 2024-03-29 RX ADMIN — Medication 100 MG: at 10:07

## 2024-03-29 RX ADMIN — PROPOFOL 200 MCG/KG/MIN: 10 INJECTION, EMULSION INTRAVENOUS at 10:07

## 2024-03-29 RX ADMIN — ONDANSETRON 4 MG: 2 INJECTION INTRAMUSCULAR; INTRAVENOUS at 12:11

## 2024-03-29 RX ADMIN — DEXMEDETOMIDINE HYDROCHLORIDE 20 MCG: 100 INJECTION, SOLUTION INTRAVENOUS at 10:08

## 2024-03-29 RX ADMIN — MIDAZOLAM 2 MG: 1 INJECTION INTRAMUSCULAR; INTRAVENOUS at 10:04

## 2024-03-29 RX ADMIN — ONDANSETRON 4 MG: 2 INJECTION INTRAMUSCULAR; INTRAVENOUS at 10:21

## 2024-03-29 RX ADMIN — DEXAMETHASONE SODIUM PHOSPHATE 4 MG: 4 INJECTION, SOLUTION INTRA-ARTICULAR; INTRALESIONAL; INTRAMUSCULAR; INTRAVENOUS; SOFT TISSUE at 10:07

## 2024-03-29 RX ADMIN — KETOROLAC TROMETHAMINE 15 MG: 15 INJECTION, SOLUTION INTRAMUSCULAR; INTRAVENOUS at 12:38

## 2024-03-29 RX ADMIN — SODIUM CHLORIDE, POTASSIUM CHLORIDE, SODIUM LACTATE AND CALCIUM CHLORIDE: 600; 310; 30; 20 INJECTION, SOLUTION INTRAVENOUS at 09:41

## 2024-03-29 ASSESSMENT — ACTIVITIES OF DAILY LIVING (ADL)
ADLS_ACUITY_SCORE: 40
ADLS_ACUITY_SCORE: 42

## 2024-03-29 ASSESSMENT — ENCOUNTER SYMPTOMS
SHORTNESS OF BREATH: 0
CHILLS: 0
ABDOMINAL PAIN: 1
VOMITING: 0
CONSTIPATION: 0
BLOOD IN STOOL: 0
FEVER: 0
NAUSEA: 1

## 2024-03-29 NOTE — H&P
GENERAL PRE-PROCEDURE:   Procedure:  EGD - Dyspepsia  Date/Time:  3/29/2024 8:03 AM    Verbal consent obtained?: Yes    Written consent obtained?: Yes    Risks and benefits: Risks, benefits and alternatives were discussed    Consent given by:  Patient  Patient states understanding of procedure being performed: Yes    Patient's understanding of procedure matches consent: Yes    Procedure consent matches procedure scheduled: Yes    Expected level of sedation:  Deep  Appropriately NPO:  Yes  ASA Class:  3  Mallampati  :  Grade 2- soft palate, base of uvula, tonsillar pillars, and portion of posterior pharyngeal wall visible  Lungs:  Lungs clear with good breath sounds bilaterally  Heart:  Normal heart sounds and rate  History & Physical reviewed:  History and physical reviewed and no updates needed  Statement of review:  I have reviewed the lab findings, diagnostic data, medications, and the plan for sedation

## 2024-03-29 NOTE — PROGRESS NOTES
Pt upset with staff and swearing at staff while in recovery. Informed patient that that behavior towards the staff wouldn't be tolerated. Patient still upset with staff but swearing stopped.

## 2024-03-29 NOTE — ED PROVIDER NOTES
I, Lyubov Duke have reviewed the documentation, personally taken the patient's history, performed an exam and agree with the physical finds, diagnosis and management plan.    HPI:  32 y/o f here with c/o severe epigastric abd pain x1mo worse after any po intake. Had EGD done today 7/10 before, 10/10 pain when she woke. PACU wouldn't give anything for pain per pt and sent her to ED for eval.     Per review of records it looks like there is concerned that maybe this may be provoked by her wegovy.  Last dose 2 weeks ago per patient.  In addition to some associated constipation.  Is a diabetic, most recent A1c with good control but has now been off of metformin x 3 months.    She has had 2 previous ED visits for this over the course of the last 1+ month with CT abdomen pelvis x 2 that was unremarkable.    Physical Exam: Patient is tearful, emotionally labile.  Regular rate, rhythm without tachycardia.  No respiratory distress.  Abdomen is obese, epigastric tenderness, no rebound or guarding.  She ambulates without difficulty.    MDM: Strongly suspect some degree of chronic pain, drug-seeking behavior.  Reportedly her EGD just performed did not show any evidence of gastritis, peptic ulcer.  Reflux is on the differential in addition to side effects of her wegovy, cyclical vomiting, diabetic gastroparesis.  She did just have EGD but overall my concern for perforation, Boerhaave's or complication from procedure is low.    ED Course/workup: Laboratory workup benign.  Was going to proceed with CT imaging to rule out perforation or complication from her recent EGD but patient is ultimately refusing this.  She has a legal guardian through the [facility] that we attempted to reach out to, but there have not been available.  Given her reassuring abdominal exam, laboratory workup will discharge back to care facility.           Final Diagnosis:     ICD-10-CM    1. Abdominal pain  R10.9               I personally saw the patient and  performed a substantive portion of the visit including all aspects of the medical decision making.  I personally made/approved the management plan and take responsibility for the patient management.     MD Srikanth Sahu, Lyubov ZULUAGA MD  03/29/24 [id number]

## 2024-03-29 NOTE — PROGRESS NOTES
PT insisting on going to the ER after discharge due to her pain. MD Mathew talked with patient and stated that she can go to the ER if she feels she needs to. Patient discharged from PACU to the ER.     Spoke to ER Charge nurse and OK to leave IV in place from PACU due to being a difficult stick. IV flushed and wrapped by Agatha MORALES.     11:16 AM  Report called to ER. Patient taken to ER via [employer].

## 2024-03-29 NOTE — ANESTHESIA PROCEDURE NOTES
Airway       Patient location during procedure: OR       Procedure Start/Stop Times: 3/29/2024 10:09 AM  Staff -        CRNA: Lu Phelan APRN CRNA       Performed By: CRNA  Consent for Airway        Urgency: elective  Indications and Patient Condition       Indications for airway management: isma-procedural       Induction type:intravenous       Mask difficulty assessment: 0 - not attempted    Final Airway Details       Final airway type: endotracheal airway       Successful airway: ETT - single  Endotracheal Airway Details        ETT size (mm): 6.5       Cuffed: yes       Cuff volume (mL): 10       Successful intubation technique: video laryngoscopy       VL Blade Size: Glidescope 3       Grade View of Cords: 1       Adjucts: stylet       Position: Right       Measured from: lips       Secured at (cm): 22       Bite block used: None    Post intubation assessment        Placement verified by: capnometry, equal breath sounds and chest rise        Number of attempts at approach: 1       Number of other approaches attempted: 1 (cricoid Pressure)       Secured with: tape       Ease of procedure: easy       Dentition: Unchanged    Medication(s) Administered   Medication Administration Time: 3/29/2024 10:09 AM

## 2024-03-29 NOTE — ED NOTES
Pt did not want ct or droperidol and just wanted to go home. Provider had pt sign out AMA. Pt left with group home staff, ambulatory.

## 2024-03-29 NOTE — ANESTHESIA POSTPROCEDURE EVALUATION
Patient: Nevin Alvarado    Procedure: Procedure(s):  ESOPHAGOGASTRODUODENOSCOPY WITH BIOSPIES       Anesthesia Type:  General    Note:  Disposition: Outpatient   Postop Pain Control: Uneventful            Sign Out: Well controlled pain   PONV: No   Neuro/Psych: Uneventful            Sign Out: Acceptable/Baseline neuro status   Airway/Respiratory: Uneventful            Sign Out: Acceptable/Baseline resp. status   CV/Hemodynamics: Uneventful            Sign Out: Acceptable CV status; No obvious hypovolemia; No obvious fluid overload   Other NRE: NONE   DID A NON-ROUTINE EVENT OCCUR? No           Last vitals:  Vitals Value Taken Time   /82 03/29/24 1041   Temp 36.9  C (98.4  F) 03/29/24 1034   Pulse 100 03/29/24 1053   Resp 22 03/29/24 1048   SpO2 100 % 03/29/24 1053   Vitals shown include unfiled device data.    Electronically Signed By: Shaka Oropeza MD  March 29, 2024  11:50 AM

## 2024-03-29 NOTE — ED TRIAGE NOTES
"Pt arrives to ED from PACU with c/o upper abdominal pain for the past month. Pt was in surgery today for a EGD which they did some biopsies. Pt was recovered from them but pt endorses \"they did nothing for my pain\". Pt did not get anything for pain upstairs. Here tearful. Endorses to 8/10 pain. Here with staff from her group home. A lot of pain medication allergies.      Triage Assessment (Adult)       Row Name 03/29/24 1122          Triage Assessment    Airway WDL WDL        Respiratory WDL    Respiratory WDL WDL        Skin Circulation/Temperature WDL    Skin Circulation/Temperature WDL WDL        Cardiac WDL    Cardiac WDL WDL        Peripheral/Neurovascular WDL    Peripheral Neurovascular WDL WDL        Cognitive/Neuro/Behavioral WDL    Cognitive/Neuro/Behavioral WDL WDL                     "

## 2024-03-29 NOTE — ED PROVIDER NOTES
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 ESOPHAGOGASTRODUODENOSCOPY TRANSORAL DIAGNOSTIC N/A 7/22/2020    Procedure: ESOPHAGOGASTRODUODENOSCOPY (EGD);  Surgeon: Bailey Arnold MD;  Location: [facility];  Service: Gastroenterology    [place] ESOPHAGOGASTRODUODENOSCOPY TRANSORAL DIAGNOSTIC N/A 8/14/2020    Procedure: ESOPHAGOGASTRODUODENOSCOPY (EGD) FOREIGN BODY  REMOVAL;  Surgeon: Jeffy Zuñiga MD;  Location: [facility];  Service: Gastroenterology    CA ESOPHAGOGASTRODUODENOSCOPY TRANSORAL DIAGNOSTIC N/A 2/25/2021    Procedure: ESOPHAGOGASTRODUODENOSCOPY (EGD) with foreign body reoval;  Surgeon: Bird Sethi MD;  Location: [facility];  Service: Gastroenterology    CA ESOPHAGOGASTRODUODENOSCOPY TRANSORAL DIAGNOSTIC N/A 4/19/2021    Procedure: ESOPHAGOGASTRODUODENOSCOPY (EGD);  Surgeon: Libia Rose MD;  Location: [facility] OR;  Service: Gastroenterology    CA SURG DIAGNOSTIC EXAM, ANORECTAL N/A 2/5/2020    Procedure: EXAM UNDER ANESTHESIA, Flexible Sigmoidoscopy, Retrieval of Foreign Body;  Surgeon: Sasha Ivan MD;  Location: [facility];  Service: General    RELEASE CARPAL TUNNEL Bilateral     RELEASE CARPAL TUNNEL Bilateral     REMOVAL, FOREIGN BODY, RECTUM N/A 7/21/2021    Procedure: MANUAL RETREIVALOF FOREIGN OBJECT- RECTUM.;  Surgeon: Aleksandra Gerber MD;  Location: [facility] OR    SIGMOIDOSCOPY FLEXIBLE N/A 1/10/2017    Procedure: SIGMOIDOSCOPY FLEXIBLE;  Surgeon: Annmarie Haynes MD;  Location:  OR    SIGMOIDOSCOPY FLEXIBLE N/A 5/8/2018    Procedure: SIGMOIDOSCOPY FLEXIBLE;  flex sig with foreign body removal using snare and rattooth forcep;  Surgeon: Soham Cano MD;  Location: [facility]    SIGMOIDOSCOPY FLEXIBLE N/A 2/20/2019    Procedure: Exam under anesthesia Colonoscopy with attempted  removal of rectal foreign body;  Surgeon: Estrada Chávez MD;  Location:  OR       CURRENT MEDICATIONS:    acetaminophen (TYLENOL) 500 MG tablet  albuterol (PROAIR HFA/PROVENTIL HFA/VENTOLIN HFA) 108 (90 Base) MCG/ACT inhaler  albuterol (PROVENTIL) (2.5 MG/3ML) 0.083% neb solution  BANOPHEN 2-0.1 % external cream  benzonatate (TESSALON) 100 MG capsule  brexpiprazole (REXULTI) 1 MG tablet  cetirizine (ZYRTEC) 10 MG tablet  Cholecalciferol (D3 HIGH POTENCY) 25 MCG (1000 UT) CAPS  clonazePAM (KLONOPIN) 0.5 MG  tablet  cyclobenzaprine (FLEXERIL) 10 MG tablet  ferrous sulfate (FEROSUL) 325 (65 Fe) MG tablet  fluocinonide (LIDEX) 0.05 % external cream  furosemide (LASIX) 20 MG tablet  montelukast (SINGULAIR) 10 MG tablet  norethindrone (AYGESTIN) 5 MG tablet  ondansetron (ZOFRAN-ODT) 4 MG ODT tab  pantoprazole (PROTONIX) 40 MG EC tablet  polyethylene glycol (MIRALAX) 17 GM/Dose powder  pregabalin (LYRICA) 100 MG capsule  pregabalin (LYRICA) 100 MG capsule  pseudoePHEDrine (SUDAFED) 60 MG tablet  PSYLLIUM PO  Respiratory Therapy Supplies (NEBULIZER) BRENDAN  Semaglutide-Weight Management (WEGOVY) 1 MG/0.5ML pen  SUMAtriptan (IMITREX) 25 MG tablet  valACYclovir (VALTREX) 1000 mg tablet        ALLERGIES:  Allergies   Allergen Reactions    Amoxicillin-Pot Clavulanate Other (See Comments), Swelling and Rash     PN: facial swelling, left side. Also had cortisone injection the same day as she started the Augmentin.  Noted in downtime recovery of chart.    PN: facial swelling, left side. Also had cortisone injection the same day as she started the Augmentin.; HUT Comment: PN: facial swelling, left side. Also had cortisone injection the same day as she started the Augmentin.Noted in downtime recovery of chart.; HUT Reaction: Rash; HUT Reaction: Unknown; HUT Reaction Type: Allergy; HUT Severity: Med; HUT Noted: [id number]  PN: facial swelling, left side. Also had cortisone injection the same day as she started the Augmentin.  Other reaction(s): *Unknown  PN: facial swelling, left side. Also had cortisone injection the same day as she started the Augmentin.  Noted in downtime recovery of chart.  PN: facial swelling, left side. Also had cortisone injection the same day as she started the Augmentin.  Other reaction(s): Facial swelling  Other reaction(s): Facial swelling    Hydrocodone Nausea and Vomiting and GI Disturbance     vomiting for days, PN: vomiting for days; HUT Comment: vomiting for days; HUT Reaction: Gastrointestinal; HUT  Reaction: Nausea And Vomiting; HUT Reaction Type: Intolerance; HUT Severity: Med; HUT Noted: [id number]  vomiting for days    Other reaction(s): Rash    Hydrocodone-Acetaminophen Nausea and Vomiting and Rash     Update on 12/12  Pt says she can take tylenol just not the hydrocodone.   Other reaction(s): Rash      Influenza Vaccines Other (See Comments) and Nausea and Vomiting     HUT Reaction: Nausea And Vomiting; HUT Reaction Type: Intolerance; HUT Severity: Low; HUT Noted: [id number]    Latex Rash     HUT Reaction: Rash; HUT Reaction Type: Allergy; HUT Severity: Low; HUT Noted: [id number]  Other reaction(s): Rash      Oseltamivir Hives     med stopped, PN: med stopped  med stopped, PN: med stopped; HUT Comment: med stopped, PN: med stopped; HUT Reaction: Hives; HUT Reaction Type: Allergy; HUT Severity: Med; HUT Noted: [id number]    Penicillins Anaphylaxis     HUT Reaction: Anaphylaxis; HUT Reaction Type: Allergy; HUT Severity: High; HUT Noted: [id number]    Vancomycin Itching, Swelling and Rash     Other reaction(s): Redness  Flushed face, very itchy; HUT Comment: Flushed face, very itchy; HUT Reaction: Angioedema; HUT Reaction: Redness; HUT Severity: Med; HUT Noted: [id number]    facial    Blood-Group Specific Substance Other (See Comments)     Patient has an anti-Cw and non-specific antibodies. Blood product orders may be delayed. Draw one red top and two purple top tubes for all type/screen/crossmatch orders.  Patient has anti-Cw, anti-K ([place]), Warm auto and nonspecific antibodies. Blood products may be delayed. Draw patient 24 hours prior to transfusion. Draw one red top and two purple top tubes for all type and screen orders.    Clavulanic Acid Angioedema    Fentanyl Itching    Haemophilus B Polysaccharide Vaccine Nausea and Vomiting    Naltrexone Other (See Comments)     Reaction(s): Vivid dreams.    Other Drug Allergy (See Comments)      See original file MRN [id number]. Files are marked for merge    Oxycodone  Swelling    Adhesive Tape Rash     Silicone type  Silicone type    Other reaction(s): adhesive allergy  Other reaction(s): adhesive allergy  Silicone type    Other reaction(s): adhesive allergy      Band-Aid Anti-Itch      Other reaction(s): adhesive allergy    Cephalosporins Rash    Lamotrigine Rash     Possibly associated with Lamictal.   HUT Comment: Possibly associated with Lamictal. ; HUT Reaction: Rash; HUT Reaction Type: Allergy; HUT Severity: Low; HUT Noted: [id number]    No Clinical Screening - See Comments Rash and Other (See Comments)     Silicone type  Silicone type  See original file MRN [id number]. Files are marked for merge  History of swallowing sharp metallic objects. She should not be prescribed lancets due to posed risk of swallowing.        FAMILY HISTORY:  Family History   Problem Relation Age of Onset    Diabetes Type 2  Maternal Grandmother     Diabetes Type 2  Paternal Grandmother     Breast Cancer Paternal Grandmother     Cerebrovascular Disease Father 53    Myocardial Infarction No family hx of     Coronary Artery Disease Early Onset No family hx of     Ovarian Cancer No family hx of     Colon Cancer No family hx of     Depression Mother     Anxiety Disorder Mother        SOCIAL HISTORY:   Social History     Socioeconomic History    Marital status: Single   Occupational History    Occupation: On disability   Tobacco Use    Smoking status: Never    Smokeless tobacco: Never   Substance and Sexual Activity    Alcohol use: No     Alcohol/week: 0.0 standard drinks of alcohol    Drug use: No    Sexual activity: Not Currently     Partners: Male     Birth control/protection: I.U.D.     Comment: IUD - Mirena - placed July, 2015   Social History Narrative    Single.    Living in independent living portion of [employer].    On disability.    No regular exercise.        VITALS:  Patient Vitals for the past 24 hrs:   BP Temp Temp src Pulse Resp SpO2 Height Weight   03/29/24 1245 (!) 160/85 -- --  "85 -- 96 % -- --   03/29/24 1124 (!) 184/98 -- -- 86 -- 100 % -- --   03/29/24 1121 (!) 184/98 97.6  F (36.4  C) Temporal 95 22 97 % 1.575 m (5' 2\") 112.9 kg (249 lb)       PHYSICAL EXAM    VITAL SIGNS: BP (!) 160/85   Pulse 85   Temp 97.6  F (36.4  C) (Temporal)   Resp 22   Ht 1.575 m (5' 2\")   Wt 112.9 kg (249 lb)   LMP 07/12/2023   SpO2 96%   BMI 45.54 kg/m    General Appearance: Uncomfortable appearing; Alert, cooperative, normal speech and facial symmetry, appears stated age  Head:  Normocephalic, without obvious abnormality, atraumatic  Eyes: Conjunctiva/corneas clear, EOM's intact, no nystagmus, PERRL  ENT:  Lips, mucosa, and tongue normal; teeth and gums normal, no pharyngeal inflammation, no dysphonia or difficulty swallowing, membranes are moist without pallor  Cardio:  Regular rate and rhythm, S1 and S2 normal, no murmur, rub    or gallop, 2+ pulses symmetric in all extremities  Pulm:  Clear to auscultation bilaterally, respirations unlabored with no accessory muscle use  Abdomen: Epigastric tenderness to palpation; Active bowel sounds in all quadrants; abdomen is soft, non-distended with no rebound tenderness or guarding.   Extremities: Moves all extremities  Neuro: Patient is awake, alert, and responsive to voice. No gross motor weaknesses or sensory loss; moves all extremities.    LAB:  All pertinent labs reviewed and interpreted.  Labs Ordered and Resulted from Time of ED Arrival to Time of ED Departure   COMPREHENSIVE METABOLIC PANEL - Abnormal       Result Value    Sodium 142      Potassium 4.2      Carbon Dioxide (CO2) 26      Anion Gap 9      Urea Nitrogen 8.7      Creatinine 0.70      GFR Estimate >90      Calcium 9.6      Chloride 107      Glucose 119 (*)     Alkaline Phosphatase 82      AST 17      ALT 26      Protein Total 7.2      Albumin 4.4      Bilirubin Total 0.3     CBC WITH PLATELETS AND DIFFERENTIAL - Abnormal    WBC Count 11.2 (*)     RBC Count 4.77      Hemoglobin 14.4      " Hematocrit 42.8      MCV 90      MCH 30.2      MCHC 33.6      RDW 12.9      Platelet Count 294      % Neutrophils 91      % Lymphocytes 7      % Monocytes 1      % Eosinophils 0      % Basophils 0      % Immature Granulocytes 0      NRBCs per 100 WBC 0      Absolute Neutrophils 10.2 (*)     Absolute Lymphocytes 0.8      Absolute Monocytes 0.1      Absolute Eosinophils 0.1      Absolute Basophils 0.0      Absolute Immature Granulocytes 0.0      Absolute NRBCs 0.0     LIPASE - Normal    Lipase 43         RADIOLOGY:  Reviewed all pertinent imaging. Please see official radiology report.  CT Chest/Abdomen/Pelvis w Contrast    (Results Pending)         Lizz Dow PA-C  Emergency Medicine  [facility] EMERGENCY ROOM  [facility] [address] [phone]  Dept: [phone]       Lizz Dow PA-C  03/29/24 140

## 2024-03-29 NOTE — ED NOTES
Unable to get blood from IV. Attempt butterfly but unable. Wanted  to  speak with provider. Pt now agreeable to try Toradol.

## 2024-03-29 NOTE — ANESTHESIA CARE TRANSFER NOTE
Patient: Nevin Alvarado    Procedure: Procedure(s):  ESOPHAGOGASTRODUODENOSCOPY WITH BIOSPIES       Diagnosis: Abdominal pain [R10.9]  Epigastric pain [R10.13]  Gastroesophageal reflux disease [K21.9]  Heartburn [R12]  Diagnosis Additional Information: No value filed.    Anesthesia Type:   General     Note:    Oropharynx: oropharynx clear of all foreign objects and spontaneously breathing  Level of Consciousness: drowsy  Oxygen Supplementation: face mask  Level of Supplemental Oxygen (L/min / FiO2): 8  Independent Airway: airway patency satisfactory and stable  Dentition: dentition unchanged  Vital Signs Stable: post-procedure vital signs reviewed and stable  Report to RN Given: handoff report given  Patient transferred to: PACU    Handoff Report: Identifed the Patient, Identified the Reponsible Provider, Reviewed the pertinent medical history, Discussed the surgical course, Reviewed Intra-OP anesthesia mangement and issues during anesthesia, Set expectations for post-procedure period and Allowed opportunity for questions and acknowledgement of understanding      Vitals:  Vitals Value Taken Time   /69 03/29/24 1034   Temp 36.9  C (98.4  F) 03/29/24 1034   Pulse 77 03/29/24 1036   Resp 16 03/29/24 1036   SpO2 100 % 03/29/24 1036   Vitals shown include unfiled device data.    Electronically Signed By: HU Cortés CRNA  March 29, 2024  10:38 AM

## 2024-03-29 NOTE — ANESTHESIA PREPROCEDURE EVALUATION
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 hematologic  ROS     Musculoskeletal:  - neg musculoskeletal ROS     GI/Hepatic: Comment: Patient w/ fullness, bloating, NV.  - neg GI/hepatic ROS   (+) GERD,                   Renal/Genitourinary:  - neg Renal ROS     Endo:  - neg endo ROS    (+)  type II DM,             Obesity (morbid),       Psychiatric/Substance Use: Comment: Hx of swallowing foreign bodies - neg psychiatric ROS   (+) psychiatric history anxiety, depression and other (comment)       Infectious Disease:  - neg infectious disease ROS     Malignancy:  - neg malignancy ROS     Other:  - neg other ROS    (+)  , H/O Chronic Pain,         Physical Exam    Airway  airway exam normal      Mallampati: II   TM distance: > 3 FB   Neck ROM: full   Mouth opening: > 3 cm    Respiratory Devices and Support         Dental  no notable dental history         Cardiovascular   cardiovascular exam normal          Pulmonary   pulmonary exam normal                OUTSIDE LABS:  CBC:   Lab Results   Component Value Date    WBC 10.4 03/13/2024    WBC 7.0 03/03/2024    HGB 13.7 03/13/2024    HGB 14.2 03/03/2024    HCT 40.5 03/13/2024    HCT 41.6 03/03/2024     03/13/2024     03/03/2024     BMP:   Lab Results   Component Value Date     03/13/2024     03/03/2024    POTASSIUM 3.5 03/13/2024    POTASSIUM 4.1 03/03/2024    CHLORIDE 104 03/13/2024    CHLORIDE 106 03/03/2024    CO2 25 03/13/2024    CO2 23 03/03/2024    BUN 7.4 03/13/2024    BUN 6.0 03/03/2024    CR 0.70 03/13/2024    CR 0.58 03/03/2024     (H) 03/13/2024    GLC 97 03/03/2024     COAGS:   Lab Results   Component Value Date    PTT 24 01/15/2023    INR 1.11 01/15/2023     POC:   Lab Results   Component Value Date     (H) 01/10/2021    HCG Negative 02/08/2024    HCGS Negative 11/05/2023     HEPATIC:   Lab Results   Component Value Date    ALBUMIN 4.5 03/13/2024    PROTTOTAL 7.5 03/13/2024    ALT 34 03/13/2024    AST 19 03/13/2024    ALKPHOS 93 03/13/2024    BILITOTAL 0.2 03/13/2024     OTHER:   Lab Results   Component Value Date    LACT 2.1 (H) 10/08/2022    KELBY 9.3 03/13/2024    PHOS 3.5 12/01/2019    MAG 1.9 01/02/2024    LIPASE 37 03/13/2024    AMYLASE 29 02/08/2022    TSH 4.47 (H) 06/27/2023    T4 1.17  06/27/2023    CRP 1.3 (H) 02/18/2023    SED 20 05/28/2023       Anesthesia Plan    ASA Status:  4    NPO Status:  ELEVATED Aspiration Risk/Unknown    Anesthesia Type: General.     - Airway: ETT   Induction: Propofol, Intravenous, RSI.   Maintenance: Balanced.   Techniques and Equipment:     - Airway: Video-Laryngoscope       Consents    Anesthesia Plan(s) and associated risks, benefits, and realistic alternatives discussed. Questions answered and patient/representative(s) expressed understanding.     - Discussed:     - Discussed with:  Patient, Legal Guardian      - Specific Concerns: spoke with guardian by phone, Che OR RN witness.     - Extended Intubation/Ventilatory Support Discussed: No.      - Patient is DNR/DNI Status: No     Use of blood products discussed: No .     Postoperative Care       PONV prophylaxis: Ondansetron (or other 5HT-3), Dexamethasone or Solumedrol, Background Propofol Infusion     Comments:    Other Comments:   RSI: GLP 1 agonist held since 3/10/24              Shaka Oropeza MD

## 2024-03-29 NOTE — DISCHARGE INSTRUCTIONS
You were seen here today for evaluation of abdominal pain.  Your lab work today was normal with no evidence of infection, new liver or kidney dysfunction, or pancreatitis.  You left prior to completion of your CT scan.    You were given Zofran and Toradol here to help with your symptoms.    Follow-up with your primary care provider for recheck and return here for any new or worsening symptoms including severe pain, persistent vomiting, fever, coughing up blood, black or bloody stools, or any other symptoms that concern you.

## 2024-03-30 ENCOUNTER — APPOINTMENT (OUTPATIENT)
Dept: MRI IMAGING | Facility: CLINIC | Age: 34
DRG: 074 | End: 2024-03-30
Attending: EMERGENCY MEDICINE
Payer: COMMERCIAL

## 2024-03-30 ENCOUNTER — APPOINTMENT (OUTPATIENT)
Dept: GENERAL RADIOLOGY | Facility: CLINIC | Age: 34
DRG: 074 | End: 2024-03-30
Attending: EMERGENCY MEDICINE
Payer: COMMERCIAL

## 2024-03-30 ENCOUNTER — HOSPITAL ENCOUNTER (INPATIENT)
Facility: CLINIC | Age: 34
LOS: 2 days | Discharge: ANOTHER HOSPITAL | DRG: 074 | End: 2024-04-01
Attending: EMERGENCY MEDICINE | Admitting: INTERNAL MEDICINE
Payer: COMMERCIAL

## 2024-03-30 DIAGNOSIS — R29.898 BILATERAL LEG WEAKNESS: ICD-10-CM

## 2024-03-30 DIAGNOSIS — M54.9 ACUTE MIDLINE BACK PAIN, UNSPECIFIED BACK LOCATION: ICD-10-CM

## 2024-03-30 LAB
ACANTHOCYTES BLD QL SMEAR: NORMAL
ALBUMIN UR-MCNC: NEGATIVE MG/DL
ANION GAP SERPL CALCULATED.3IONS-SCNC: 12 MMOL/L (ref 7–15)
APPEARANCE UR: CLEAR
AUER BODIES BLD QL SMEAR: NORMAL
BACTERIA #/AREA URNS HPF: ABNORMAL /HPF
BASO STIPL BLD QL SMEAR: NORMAL
BASOPHILS # BLD AUTO: 0 10E3/UL (ref 0–0.2)
BASOPHILS NFR BLD AUTO: 0 %
BILIRUB UR QL STRIP: NEGATIVE
BITE CELLS BLD QL SMEAR: NORMAL
BLISTER CELLS BLD QL SMEAR: NORMAL
BUN SERPL-MCNC: 11.5 MG/DL (ref 6–20)
BURR CELLS BLD QL SMEAR: NORMAL
CALCIUM SERPL-MCNC: 9.4 MG/DL (ref 8.6–10)
CHLORIDE SERPL-SCNC: 107 MMOL/L (ref 98–107)
CK SERPL-CCNC: 134 U/L (ref 26–192)
COLOR UR AUTO: ABNORMAL
CREAT SERPL-MCNC: 0.69 MG/DL (ref 0.51–0.95)
DACRYOCYTES BLD QL SMEAR: NORMAL
DEPRECATED HCO3 PLAS-SCNC: 22 MMOL/L (ref 22–29)
EGFRCR SERPLBLD CKD-EPI 2021: >90 ML/MIN/1.73M2
ELLIPTOCYTES BLD QL SMEAR: NORMAL
EOSINOPHIL # BLD AUTO: 0 10E3/UL (ref 0–0.7)
EOSINOPHIL NFR BLD AUTO: 0 %
ERYTHROCYTE [DISTWIDTH] IN BLOOD BY AUTOMATED COUNT: 13.1 % (ref 10–15)
FRAGMENTS BLD QL SMEAR: NORMAL
GLUCOSE SERPL-MCNC: 101 MG/DL (ref 70–99)
GLUCOSE UR STRIP-MCNC: NEGATIVE MG/DL
HCT VFR BLD AUTO: 42.5 % (ref 35–47)
HGB BLD-MCNC: 13.9 G/DL (ref 11.7–15.7)
HGB C CRYSTALS: NORMAL
HGB UR QL STRIP: NEGATIVE
HOWELL-JOLLY BOD BLD QL SMEAR: NORMAL
IMM GRANULOCYTES # BLD: 0 10E3/UL
IMM GRANULOCYTES NFR BLD: 0 %
KETONES UR STRIP-MCNC: NEGATIVE MG/DL
LEUKOCYTE ESTERASE UR QL STRIP: NEGATIVE
LYMPHOCYTES # BLD AUTO: 2.6 10E3/UL (ref 0.8–5.3)
LYMPHOCYTES NFR BLD AUTO: 28 %
MCH RBC QN AUTO: 30.9 PG (ref 26.5–33)
MCHC RBC AUTO-ENTMCNC: 32.7 G/DL (ref 31.5–36.5)
MCV RBC AUTO: 94 FL (ref 78–100)
MONOCYTES # BLD AUTO: 0.6 10E3/UL (ref 0–1.3)
MONOCYTES NFR BLD AUTO: 7 %
NEUTROPHILS # BLD AUTO: 6.1 10E3/UL (ref 1.6–8.3)
NEUTROPHILS NFR BLD AUTO: 65 %
NEUTS HYPERSEG BLD QL SMEAR: NORMAL
NITRATE UR QL: NEGATIVE
NRBC # BLD AUTO: 0 10E3/UL
NRBC BLD AUTO-RTO: 0 /100
PH UR STRIP: 6 [PH] (ref 5–7)
PLAT MORPH BLD: NORMAL
PLATELET # BLD AUTO: 220 10E3/UL (ref 150–450)
POLYCHROMASIA BLD QL SMEAR: NORMAL
POTASSIUM SERPL-SCNC: 3.8 MMOL/L (ref 3.4–5.3)
RBC # BLD AUTO: 4.5 10E6/UL (ref 3.8–5.2)
RBC AGGLUT BLD QL: NORMAL
RBC MORPH BLD: NORMAL
RBC URINE: 0 /HPF
ROULEAUX BLD QL SMEAR: NORMAL
SICKLE CELLS BLD QL SMEAR: NORMAL
SMUDGE CELLS BLD QL SMEAR: NORMAL
SODIUM SERPL-SCNC: 141 MMOL/L (ref 135–145)
SP GR UR STRIP: 1.02 (ref 1–1.03)
SPHEROCYTES BLD QL SMEAR: NORMAL
STOMATOCYTES BLD QL SMEAR: NORMAL
TARGETS BLD QL SMEAR: NORMAL
TOXIC GRANULES BLD QL SMEAR: NORMAL
UROBILINOGEN UR STRIP-MCNC: NORMAL MG/DL
VARIANT LYMPHS BLD QL SMEAR: NORMAL
WBC # BLD AUTO: 9.5 10E3/UL (ref 4–11)
WBC URINE: 0 /HPF

## 2024-03-30 PROCEDURE — 250N000013 HC RX MED GY IP 250 OP 250 PS 637: Performed by: EMERGENCY MEDICINE

## 2024-03-30 PROCEDURE — 82550 ASSAY OF CK (CPK): CPT | Performed by: EMERGENCY MEDICINE

## 2024-03-30 PROCEDURE — 81001 URINALYSIS AUTO W/SCOPE: CPT | Performed by: EMERGENCY MEDICINE

## 2024-03-30 PROCEDURE — 99285 EMERGENCY DEPT VISIT HI MDM: CPT | Mod: 25

## 2024-03-30 PROCEDURE — 51798 US URINE CAPACITY MEASURE: CPT

## 2024-03-30 PROCEDURE — 36415 COLL VENOUS BLD VENIPUNCTURE: CPT | Performed by: EMERGENCY MEDICINE

## 2024-03-30 PROCEDURE — 74019 RADEX ABDOMEN 2 VIEWS: CPT

## 2024-03-30 PROCEDURE — 99223 1ST HOSP IP/OBS HIGH 75: CPT | Performed by: INTERNAL MEDICINE

## 2024-03-30 PROCEDURE — 80048 BASIC METABOLIC PNL TOTAL CA: CPT | Performed by: EMERGENCY MEDICINE

## 2024-03-30 PROCEDURE — 255N000002 HC RX 255 OP 636: Performed by: EMERGENCY MEDICINE

## 2024-03-30 PROCEDURE — 71046 X-RAY EXAM CHEST 2 VIEWS: CPT

## 2024-03-30 PROCEDURE — A9585 GADOBUTROL INJECTION: HCPCS | Performed by: EMERGENCY MEDICINE

## 2024-03-30 PROCEDURE — 120N000001 HC R&B MED SURG/OB

## 2024-03-30 PROCEDURE — 85025 COMPLETE CBC W/AUTO DIFF WBC: CPT | Performed by: EMERGENCY MEDICINE

## 2024-03-30 PROCEDURE — 96375 TX/PRO/DX INJ NEW DRUG ADDON: CPT

## 2024-03-30 PROCEDURE — 96374 THER/PROPH/DIAG INJ IV PUSH: CPT | Mod: 59

## 2024-03-30 PROCEDURE — 72158 MRI LUMBAR SPINE W/O & W/DYE: CPT

## 2024-03-30 PROCEDURE — 93005 ELECTROCARDIOGRAM TRACING: CPT

## 2024-03-30 PROCEDURE — 250N000011 HC RX IP 250 OP 636: Performed by: EMERGENCY MEDICINE

## 2024-03-30 PROCEDURE — 72157 MRI CHEST SPINE W/O & W/DYE: CPT

## 2024-03-30 RX ORDER — AMOXICILLIN 250 MG
2 CAPSULE ORAL 2 TIMES DAILY PRN
Status: DISCONTINUED | OUTPATIENT
Start: 2024-03-30 | End: 2024-04-01 | Stop reason: HOSPADM

## 2024-03-30 RX ORDER — DIAZEPAM 10 MG/2ML
2.5 INJECTION, SOLUTION INTRAMUSCULAR; INTRAVENOUS ONCE
Status: COMPLETED | OUTPATIENT
Start: 2024-03-30 | End: 2024-03-30

## 2024-03-30 RX ORDER — DIAZEPAM 5 MG
5 TABLET ORAL ONCE
Status: DISCONTINUED | OUTPATIENT
Start: 2024-03-30 | End: 2024-03-30

## 2024-03-30 RX ORDER — CALCIUM CARBONATE 500 MG/1
1000 TABLET, CHEWABLE ORAL 4 TIMES DAILY PRN
Status: DISCONTINUED | OUTPATIENT
Start: 2024-03-30 | End: 2024-04-01 | Stop reason: HOSPADM

## 2024-03-30 RX ORDER — CYCLOBENZAPRINE HCL 10 MG
10 TABLET ORAL ONCE
Status: COMPLETED | OUTPATIENT
Start: 2024-03-30 | End: 2024-03-30

## 2024-03-30 RX ORDER — IBUPROFEN 600 MG/1
600 TABLET, FILM COATED ORAL ONCE
Status: COMPLETED | OUTPATIENT
Start: 2024-03-30 | End: 2024-03-30

## 2024-03-30 RX ORDER — ACETAMINOPHEN 650 MG/1
650 SUPPOSITORY RECTAL EVERY 4 HOURS PRN
Status: DISCONTINUED | OUTPATIENT
Start: 2024-03-30 | End: 2024-04-01 | Stop reason: HOSPADM

## 2024-03-30 RX ORDER — ACETAMINOPHEN 500 MG
1000 TABLET ORAL ONCE
Status: COMPLETED | OUTPATIENT
Start: 2024-03-30 | End: 2024-03-30

## 2024-03-30 RX ORDER — GADOBUTROL 604.72 MG/ML
11 INJECTION INTRAVENOUS ONCE
Status: COMPLETED | OUTPATIENT
Start: 2024-03-30 | End: 2024-03-30

## 2024-03-30 RX ORDER — HYDROMORPHONE HYDROCHLORIDE 2 MG/1
2 TABLET ORAL EVERY 4 HOURS PRN
Status: DISCONTINUED | OUTPATIENT
Start: 2024-03-30 | End: 2024-04-01 | Stop reason: HOSPADM

## 2024-03-30 RX ORDER — KETOROLAC TROMETHAMINE 15 MG/ML
15 INJECTION, SOLUTION INTRAMUSCULAR; INTRAVENOUS ONCE
Status: COMPLETED | OUTPATIENT
Start: 2024-03-30 | End: 2024-03-30

## 2024-03-30 RX ORDER — AMOXICILLIN 250 MG
1 CAPSULE ORAL 2 TIMES DAILY PRN
Status: DISCONTINUED | OUTPATIENT
Start: 2024-03-30 | End: 2024-04-01 | Stop reason: HOSPADM

## 2024-03-30 RX ORDER — KETOROLAC TROMETHAMINE 15 MG/ML
15 INJECTION, SOLUTION INTRAMUSCULAR; INTRAVENOUS EVERY 6 HOURS PRN
Status: DISCONTINUED | OUTPATIENT
Start: 2024-03-30 | End: 2024-03-31

## 2024-03-30 RX ORDER — PREGABALIN 100 MG/1
200 CAPSULE ORAL ONCE
Status: COMPLETED | OUTPATIENT
Start: 2024-03-30 | End: 2024-03-30

## 2024-03-30 RX ORDER — ACETAMINOPHEN 325 MG/1
650 TABLET ORAL EVERY 4 HOURS PRN
Status: DISCONTINUED | OUTPATIENT
Start: 2024-03-30 | End: 2024-04-01 | Stop reason: HOSPADM

## 2024-03-30 RX ADMIN — ACETAMINOPHEN 1000 MG: 500 TABLET, FILM COATED ORAL at 15:16

## 2024-03-30 RX ADMIN — DIAZEPAM 2.5 MG: 5 INJECTION, SOLUTION INTRAMUSCULAR; INTRAVENOUS at 17:05

## 2024-03-30 RX ADMIN — CYCLOBENZAPRINE 10 MG: 10 TABLET, FILM COATED ORAL at 16:32

## 2024-03-30 RX ADMIN — GADOBUTROL 11 ML: 604.72 INJECTION INTRAVENOUS at 19:00

## 2024-03-30 RX ADMIN — PREGABALIN 200 MG: 100 CAPSULE ORAL at 22:26

## 2024-03-30 RX ADMIN — IBUPROFEN 600 MG: 600 TABLET, FILM COATED ORAL at 20:25

## 2024-03-30 RX ADMIN — KETOROLAC TROMETHAMINE 15 MG: 15 INJECTION, SOLUTION INTRAMUSCULAR; INTRAVENOUS at 13:40

## 2024-03-30 ASSESSMENT — ACTIVITIES OF DAILY LIVING (ADL)
ADLS_ACUITY_SCORE: 42

## 2024-03-30 ASSESSMENT — COLUMBIA-SUICIDE SEVERITY RATING SCALE - C-SSRS
6. HAVE YOU EVER DONE ANYTHING, STARTED TO DO ANYTHING, OR PREPARED TO DO ANYTHING TO END YOUR LIFE?: NO
1. IN THE PAST MONTH, HAVE YOU WISHED YOU WERE DEAD OR WISHED YOU COULD GO TO SLEEP AND NOT WAKE UP?: NO
2. HAVE YOU ACTUALLY HAD ANY THOUGHTS OF KILLING YOURSELF IN THE PAST MONTH?: NO
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
## 2024-03-30 NOTE — ED NOTES
Bed: ED30  Expected date: 3/30/24  Expected time:   Means of arrival: Ambulance  Comments:  [facility]

## 2024-03-30 NOTE — ED NOTES
Patient becoming increasingly belligerent with ED staff. Patient unhappy with the fact that she has to have a sitter due to previous history of ingesting foreign bodies. Wanting to speak with doctor and charge nurse. Both charge and doctor notified.

## 2024-03-30 NOTE — ED TRIAGE NOTES
"Pt presents for complaint of generalized weakness and abdominal pain. Pt states she had an upper endoscopy yesterday. States since then she has been feeling increased weakness. States today \"I feel so weak I can't even swallow\" Pt controlling her airway without difficulty. Able to speak in full sentences. States she continues to have abdominal pain which has been on going for x1.5 months and is the reason she had the endoscopy done. IV placed by EMS during transport and report giving 100 mcg of fentanyl enroute. Pt reports minimal pain improvement. ABC intact, A&Ox4.     Triage Assessment (Adult)       Row Name 03/30/24 1138          Triage Assessment    Airway WDL WDL        Respiratory WDL    Respiratory WDL WDL        Skin Circulation/Temperature WDL    Skin Circulation/Temperature WDL WDL        Cardiac WDL    Cardiac WDL WDL        Peripheral/Neurovascular WDL    Peripheral Neurovascular WDL WDL        Cognitive/Neuro/Behavioral WDL    Cognitive/Neuro/Behavioral WDL WDL                     "

## 2024-03-31 LAB
ANION GAP SERPL CALCULATED.3IONS-SCNC: 8 MMOL/L (ref 7–15)
ATRIAL RATE - MUSE: 70 BPM
BUN SERPL-MCNC: 15.5 MG/DL (ref 6–20)
CALCIUM SERPL-MCNC: 9.1 MG/DL (ref 8.6–10)
CHLORIDE SERPL-SCNC: 107 MMOL/L (ref 98–107)
CREAT SERPL-MCNC: 0.79 MG/DL (ref 0.51–0.95)
DEPRECATED HCO3 PLAS-SCNC: 25 MMOL/L (ref 22–29)
DIASTOLIC BLOOD PRESSURE - MUSE: NORMAL MMHG
EGFRCR SERPLBLD CKD-EPI 2021: >90 ML/MIN/1.73M2
ERYTHROCYTE [DISTWIDTH] IN BLOOD BY AUTOMATED COUNT: 13.3 % (ref 10–15)
GLUCOSE SERPL-MCNC: 102 MG/DL (ref 70–99)
HCT VFR BLD AUTO: 40.7 % (ref 35–47)
HGB BLD-MCNC: 13.4 G/DL (ref 11.7–15.7)
INTERPRETATION ECG - MUSE: NORMAL
MCH RBC QN AUTO: 30.5 PG (ref 26.5–33)
MCHC RBC AUTO-ENTMCNC: 32.9 G/DL (ref 31.5–36.5)
MCV RBC AUTO: 93 FL (ref 78–100)
P AXIS - MUSE: 36 DEGREES
PLATELET # BLD AUTO: 298 10E3/UL (ref 150–450)
POTASSIUM SERPL-SCNC: 4.2 MMOL/L (ref 3.4–5.3)
PR INTERVAL - MUSE: 160 MS
QRS DURATION - MUSE: 80 MS
QT - MUSE: 354 MS
QTC - MUSE: 382 MS
R AXIS - MUSE: 59 DEGREES
RBC # BLD AUTO: 4.39 10E6/UL (ref 3.8–5.2)
SODIUM SERPL-SCNC: 140 MMOL/L (ref 135–145)
SYSTOLIC BLOOD PRESSURE - MUSE: NORMAL MMHG
T AXIS - MUSE: 3 DEGREES
VENTRICULAR RATE- MUSE: 70 BPM
WBC # BLD AUTO: 6.6 10E3/UL (ref 4–11)

## 2024-03-31 PROCEDURE — 82374 ASSAY BLOOD CARBON DIOXIDE: CPT | Performed by: INTERNAL MEDICINE

## 2024-03-31 PROCEDURE — 36415 COLL VENOUS BLD VENIPUNCTURE: CPT | Performed by: PSYCHIATRY & NEUROLOGY

## 2024-03-31 PROCEDURE — 250N000011 HC RX IP 250 OP 636: Performed by: INTERNAL MEDICINE

## 2024-03-31 PROCEDURE — 120N000001 HC R&B MED SURG/OB

## 2024-03-31 PROCEDURE — 83516 IMMUNOASSAY NONANTIBODY: CPT | Performed by: PSYCHIATRY & NEUROLOGY

## 2024-03-31 PROCEDURE — 250N000013 HC RX MED GY IP 250 OP 250 PS 637: Performed by: INTERNAL MEDICINE

## 2024-03-31 PROCEDURE — 250N000013 HC RX MED GY IP 250 OP 250 PS 637: Performed by: HOSPITALIST

## 2024-03-31 PROCEDURE — 85027 COMPLETE CBC AUTOMATED: CPT | Performed by: INTERNAL MEDICINE

## 2024-03-31 PROCEDURE — 36415 COLL VENOUS BLD VENIPUNCTURE: CPT | Performed by: INTERNAL MEDICINE

## 2024-03-31 PROCEDURE — G0378 HOSPITAL OBSERVATION PER HR: HCPCS

## 2024-03-31 PROCEDURE — 99232 SBSQ HOSP IP/OBS MODERATE 35: CPT | Performed by: HOSPITALIST

## 2024-03-31 RX ORDER — NALOXONE HYDROCHLORIDE 0.4 MG/ML
0.4 INJECTION, SOLUTION INTRAMUSCULAR; INTRAVENOUS; SUBCUTANEOUS
Status: DISCONTINUED | OUTPATIENT
Start: 2024-03-31 | End: 2024-04-01 | Stop reason: HOSPADM

## 2024-03-31 RX ORDER — NALOXONE HYDROCHLORIDE 0.4 MG/ML
0.2 INJECTION, SOLUTION INTRAMUSCULAR; INTRAVENOUS; SUBCUTANEOUS
Status: DISCONTINUED | OUTPATIENT
Start: 2024-03-31 | End: 2024-04-01 | Stop reason: HOSPADM

## 2024-03-31 RX ORDER — PREGABALIN 100 MG/1
100 CAPSULE ORAL EVERY MORNING
Status: DISCONTINUED | OUTPATIENT
Start: 2024-03-31 | End: 2024-04-01 | Stop reason: HOSPADM

## 2024-03-31 RX ORDER — VALACYCLOVIR HYDROCHLORIDE 1 G/1
2000 TABLET, FILM COATED ORAL 2 TIMES DAILY
Qty: 8 TABLET | Refills: 0 | Status: COMPLETED | OUTPATIENT
Start: 2024-03-31 | End: 2024-04-01

## 2024-03-31 RX ORDER — PREGABALIN 100 MG/1
100 CAPSULE ORAL EVERY MORNING
Status: DISCONTINUED | OUTPATIENT
Start: 2024-04-01 | End: 2024-03-31

## 2024-03-31 RX ORDER — PREGABALIN 100 MG/1
200 CAPSULE ORAL AT BEDTIME
Status: DISCONTINUED | OUTPATIENT
Start: 2024-03-31 | End: 2024-04-01 | Stop reason: HOSPADM

## 2024-03-31 RX ORDER — CETIRIZINE HYDROCHLORIDE 10 MG/1
10 TABLET ORAL EVERY EVENING
Status: DISCONTINUED | OUTPATIENT
Start: 2024-03-31 | End: 2024-04-01 | Stop reason: HOSPADM

## 2024-03-31 RX ORDER — PANTOPRAZOLE SODIUM 40 MG/1
40 TABLET, DELAYED RELEASE ORAL DAILY
Status: DISCONTINUED | OUTPATIENT
Start: 2024-03-31 | End: 2024-04-01 | Stop reason: HOSPADM

## 2024-03-31 RX ORDER — GABAPENTIN 100 MG/1
300 CAPSULE ORAL 3 TIMES DAILY
Status: DISCONTINUED | OUTPATIENT
Start: 2024-03-31 | End: 2024-03-31

## 2024-03-31 RX ORDER — POLYETHYLENE GLYCOL 3350 17 G/17G
17 POWDER, FOR SOLUTION ORAL DAILY
Status: DISCONTINUED | OUTPATIENT
Start: 2024-03-31 | End: 2024-04-01 | Stop reason: HOSPADM

## 2024-03-31 RX ORDER — ALBUTEROL SULFATE 90 UG/1
2 AEROSOL, METERED RESPIRATORY (INHALATION) EVERY 6 HOURS PRN
Status: DISCONTINUED | OUTPATIENT
Start: 2024-03-31 | End: 2024-04-01 | Stop reason: HOSPADM

## 2024-03-31 RX ORDER — MONTELUKAST SODIUM 10 MG/1
10 TABLET ORAL EVERY EVENING
Status: DISCONTINUED | OUTPATIENT
Start: 2024-03-31 | End: 2024-04-01 | Stop reason: HOSPADM

## 2024-03-31 RX ADMIN — HYDROMORPHONE HYDROCHLORIDE 2 MG: 2 TABLET ORAL at 21:07

## 2024-03-31 RX ADMIN — KETOROLAC TROMETHAMINE 15 MG: 15 INJECTION, SOLUTION INTRAMUSCULAR; INTRAVENOUS at 03:08

## 2024-03-31 RX ADMIN — HYDROMORPHONE HYDROCHLORIDE 2 MG: 2 TABLET ORAL at 15:22

## 2024-03-31 RX ADMIN — MONTELUKAST 10 MG: 10 TABLET, FILM COATED ORAL at 20:57

## 2024-03-31 RX ADMIN — ACETAMINOPHEN 650 MG: 325 TABLET, FILM COATED ORAL at 21:07

## 2024-03-31 RX ADMIN — GABAPENTIN 300 MG: 100 CAPSULE ORAL at 09:41

## 2024-03-31 RX ADMIN — PANTOPRAZOLE SODIUM 40 MG: 40 TABLET, DELAYED RELEASE ORAL at 10:05

## 2024-03-31 RX ADMIN — HYDROMORPHONE HYDROCHLORIDE 2 MG: 2 TABLET ORAL at 09:42

## 2024-03-31 RX ADMIN — HYDROMORPHONE HYDROCHLORIDE 2 MG: 2 TABLET ORAL at 00:31

## 2024-03-31 RX ADMIN — POLYETHYLENE GLYCOL 3350 17 G: 17 POWDER, FOR SOLUTION ORAL at 20:55

## 2024-03-31 RX ADMIN — PREGABALIN 200 MG: 100 CAPSULE ORAL at 21:48

## 2024-03-31 RX ADMIN — ACETAMINOPHEN 650 MG: 325 TABLET, FILM COATED ORAL at 11:49

## 2024-03-31 RX ADMIN — PREGABALIN 100 MG: 100 CAPSULE ORAL at 11:47

## 2024-03-31 RX ADMIN — HYDROMORPHONE HYDROCHLORIDE 2 MG: 2 TABLET ORAL at 05:23

## 2024-03-31 RX ADMIN — VALACYCLOVIR HYDROCHLORIDE 2000 MG: 1 TABLET, FILM COATED ORAL at 20:57

## 2024-03-31 RX ADMIN — CETIRIZINE HYDROCHLORIDE 10 MG: 10 TABLET, FILM COATED ORAL at 20:55

## 2024-03-31 ASSESSMENT — ACTIVITIES OF DAILY LIVING (ADL)
ADLS_ACUITY_SCORE: 42
ADLS_ACUITY_SCORE: 56
ADLS_ACUITY_SCORE: 44
ADLS_ACUITY_SCORE: 56
ADLS_ACUITY_SCORE: 42
ADLS_ACUITY_SCORE: 44
ADLS_ACUITY_SCORE: 54
ADLS_ACUITY_SCORE: 56
ADLS_ACUITY_SCORE: 54
ADLS_ACUITY_SCORE: 42
ADLS_ACUITY_SCORE: 54

## 2024-03-31 NOTE — PHARMACY-ADMISSION MEDICATION HISTORY
Pharmacist Admission Medication History    Admission medication history is complete. The information provided in this note is only as accurate as the sources available at the time of the update.    Information Source(s): Patient and CareEverywhere/SureScripts via in-person    Pertinent Information: None    Changes made to PTA medication list:  Added: None  Deleted: Banophen cream, Lidex cream (not using any creams), Rexulti (discontinued), furosemide (discontinued), Sudafed (not taking), sumatriptan (not taking)  Changed: pantoprazole (added frequency), Miralax (added frequency), fiber (added frequency)    Allergies reviewed with patient and updates made in EHR: yes    Medication History Completed By: Nish Douglass [facility] 3/30/2024 8:41 PM    PTA Med List   Medication Sig Last Dose    acetaminophen (TYLENOL) 500 MG tablet Take 2 tablets (1,000 mg) by mouth every 6 hours as needed for pain or fever Past Week    albuterol (PROAIR HFA/PROVENTIL HFA/VENTOLIN HFA) 108 (90 Base) MCG/ACT inhaler Inhale 2 puffs into the lungs every 6 hours as needed for shortness of breath / dyspnea or wheezing More than a month    albuterol (PROVENTIL) (2.5 MG/3ML) 0.083% neb solution Take 1 vial (2.5 mg) by nebulization every 6 hours as needed for shortness of breath or wheezing Unknown    benzonatate (TESSALON) 100 MG capsule Take 1 capsule (100 mg) by mouth 3 times daily as needed for cough More than a month    cetirizine (ZYRTEC) 10 MG tablet Take 1 tablet (10 mg) by mouth daily 3/29/2024 at PM    Cholecalciferol (D3 HIGH POTENCY) 25 MCG (1000 UT) CAPS Take 50 mcg by mouth daily 3/30/2024 at AM    clonazePAM (KLONOPIN) 0.5 MG tablet Take 0.5mg daily PRN anxiety- 20 tablets per month, try to keep it to 3-4x per week More than a month    cyclobenzaprine (FLEXERIL) 10 MG tablet Take 1 tablet (10 mg) by mouth 3 times daily as needed for muscle spasms Unknown    ferrous sulfate (FEROSUL) 325 (65 Fe) MG tablet Take 1 tablet (325 mg) by mouth  daily (with breakfast) 3/30/2024 at AM    montelukast (SINGULAIR) 10 MG tablet Take 10 mg by mouth every evening 3/29/2024 at PM    norethindrone (AYGESTIN) 5 MG tablet Take 5 mg by mouth daily 3/30/2024 at AM    ondansetron (ZOFRAN-ODT) 4 MG ODT tab Take 1 tablet (4 mg) by mouth every 8 hours as needed for nausea Past Week    pantoprazole (PROTONIX) 40 MG EC tablet Take 40 mg by mouth daily 3/30/2024 at AM    polyethylene glycol (MIRALAX) 17 GM/Dose powder Take 17 g by mouth daily as needed for constipation Past Week    pregabalin (LYRICA) 100 MG capsule Take 100 mg by mouth every morning 3/30/2024 at AM    pregabalin (LYRICA) 100 MG capsule Take 200 mg by mouth at bedtime 3/29/2024 at PM    PSYLLIUM PO Take 1 tsp by mouth daily Past Week    Semaglutide-Weight Management (WEGOVY) 1 MG/0.5ML pen Inject 1 mg Subcutaneous once a week 3/17/2024    valACYclovir (VALTREX) 1000 mg tablet Take 2,000 mg by mouth 2 times daily as needed Take 2 tablets by mouth two times daily for one day. Use as needed at onset of cold sore. More than a month

## 2024-03-31 NOTE — PLAN OF CARE
Goal Outcome Evaluation:       [facility]    ED Boarding Nurse Handoff Addendum Report:    Date/time: 3/31/2024, 4:58 PM    Activity Level:  Patient not ambulating due to too much pain to lower extremities. Recliner bound.     Fall Risk: Yes:  Patient 1:1 attention     Active Infusions: N/A    Current Meds Due: Scheduled kyrica and protonix.      Current care needs: PRN pain management, needs a lot of reassurance due to anxiety    Oxygen requirements (liters/min and/or FiO2): Room air    Respiratory status: Room air    Vital signs (within last 30 minutes):    Vitals:    03/30/24 1530 03/30/24 2028 03/31/24 0558 03/31/24 1126   BP: (!) 118/93 (!) 132/91 103/67 110/65   BP Location:   Right arm Right arm   Pulse: 77 76 70 88   Resp:   16 16   Temp:   97.9  F (36.6  C) 97.8  F (36.6  C)   TempSrc:   Oral Oral   SpO2: 98% 96% 96% 96%       Focused assessment within last 30 minutes:    The patient remains alert and oriented x4. Sitter at bedside to monitor. Patient was quite anxious and frustrated in beginning of shift. Gave a lot of reassurance and education and is better this afternoon. PRN dilaudid given for pain to lower legs and for headache. Patient not out of chair. Straight cathed this afternoon due to patient not being able to urinate on own. Provider aware. VSS on RA. PIV removed due to it coming out.     ED Boarding Nurse name: Liz Briones RN
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
## 2024-03-31 NOTE — PLAN OF CARE
[facility]    ED Boarding Nurse Handoff Addendum Report:    Date/time: 3/31/2024, 6:15 AM    Activity Level: assist of 2    Fall Risk: Yes:  nonskid shoes/slippers when out of bed, arm band in place, patient and family education, assistive device/personal items within reach, activity supervised, and room door open    Active Infusions: PIV SL    Current Meds Due: See Mar    Current care needs: See orders    Oxygen requirements (liters/min and/or FiO2): None    Respiratory status: Room air    Vital signs (within last 30 minutes):    Vitals:    03/30/24 1430 03/30/24 1530 03/30/24 2028 03/31/24 0558   BP: (!) 136/92 (!) 118/93 (!) 132/91 103/67   BP Location:    Right arm   Pulse: 74 77 76 70   Resp:    16   Temp:    97.9  F (36.6  C)   TempSrc:    Oral   SpO2: 100% 98% 96% 96%       Focused assessment within last 30 minutes:    A&Ox4, freq use of call light, sitter at bedside, rates pain 8-10 during shift, given PRN dilaudid, Toradol, not effective per pt. Repositioned and transferred to recliner for pain management. Pt freq request leong cath to be placed, c/o bladder fullness with pain/pressure, bladder scanned 168 ml, explained indications for leong cath placement.       ED Boarding Nurse name: Kathleen Womack RN

## 2024-03-31 NOTE — PROGRESS NOTES
Paged from Dr. Young regarding patient   33 year old extensive psych medical history presented from group home with torso pain and will not move lower extremities with imaging evidence below    Further review of chart, similar MRI findings demonstrated 2018 and again in 2023. 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 Was stable off IVIG per neurology last note in 5/2023. Follows with outpatient neurology ?Noran?    No structural findings on MRI to surgically intervene on or explain symptoms   Recommend neurology consultation, evaluation, work up and recommendations     Dr Young in agreement      Narrative & Impression   EXAM: MR LUMBAR SPINE W/O and W CONTRAST  LOCATION: [facility]  DATE: 3/30/2024     INDICATION: back pain, unable to move legs  COMPARISON: None.  CONTRAST: 11 mL Gadavist  TECHNIQUE: Routine Lumbar Spine MRI without and with IV contrast.     FINDINGS:   Transitional lumbosacral anatomy. Last well-formed disc space labeled L5-S1 with right L5 partial sacralization. Right L5 partial sacralization demonstrates a pseudoarthrosis with adjacent fatty marrow changes.  Normal vertebral body heights.  No   concerning bone marrow lesions.  Mid to lower lumbar spinal canal partially fatty filum. Unremarkable visualized bony pelvis.  No significant subluxations.     Lumbar mild to moderate dextro scoliosis. Essentially normal disc space height and signal. No significant degenerative disc disease. No significant facet arthropathy. No significant bulge or posterior disc protrusion. No significant thecal sac stenosis.   No significant neural foraminal stenosis.     Cauda equina nerve roots appear mildly enlarged as well as the lower lumbar spine and sacral exiting nerve roots. Some of these nerve roots demonstrate thin nonnodular enhancement. Findings may reflect chronic inflammatory demyelinating polyneuropathy,    Charcot-Monique-Tooth, neurofibromatosis type I. Enhancement could suggest acute infectious inflammatory process superimposed on chronic findings.     No abnormal conus signal. No conus pathologic enhancement. Conus terminates at L2.     EXTRASPINAL FINDINGS:  No significant findings.                                                                       IMPRESSION:  1.  Cauda equina nerve roots appear mildly enlarged as well as the lower lumbar spine and sacral exiting nerve roots. Some of these nerve roots demonstrate thin nonnodular enhancement. Findings may reflect chronic inflammatory demyelinating   polyneuropathy, Charcot-Monique-Tooth, neurofibromatosis type I. Enhancement could suggest acute infectious inflammatory process superimposed on chronic findings.     2.  Transitional lumbosacral anatomy described above.     3.  No significant degenerative changes.     4.  No significant thecal sac stenosis. No significant neural foraminal stenosis.

## 2024-03-31 NOTE — CONSULTS
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 constipation      pregabalin (LYRICA) 100 MG capsule Take 100 mg by mouth every morning      pregabalin (LYRICA) 100 MG capsule Take 200 mg by mouth at bedtime      PSYLLIUM PO Take 1 tsp by mouth daily      Semaglutide-Weight  Management (WEGOVY) 1 MG/0.5ML pen Inject 1 mg Subcutaneous once a week 2 mL 3    valACYclovir (VALTREX) 1000 mg tablet Take 2,000 mg by mouth 2 times daily as needed Take 2 tablets by mouth two times daily for one day. Use as needed at onset of cold sore.      Respiratory Therapy Supplies (NEBULIZER) BRENDAN Nebulizer device.  Albuterol nebulization every 2 hours as needed for shortness of breath or wheezing. 1 each 0     Allergies:  Allergies   Allergen Reactions    Amoxicillin-Pot Clavulanate Other (See Comments), Swelling and Rash     PN: facial swelling, left side. Also had cortisone injection the same day as she started the Augmentin.  Noted in downtime recovery of chart.    PN: facial swelling, left side. Also had cortisone injection the same day as she started the Augmentin.; HUT Comment: PN: facial swelling, left side. Also had cortisone injection the same day as she started the Augmentin.Noted in downtime recovery of chart.; HUT Reaction: Rash; HUT Reaction: Unknown; HUT Reaction Type: Allergy; HUT Severity: Med; HUT Noted: [id number]  PN: facial swelling, left side. Also had cortisone injection the same day as she started the Augmentin.  Other reaction(s): *Unknown  PN: facial swelling, left side. Also had cortisone injection the same day as she started the Augmentin.  Noted in downtime recovery of chart.  PN: facial swelling, left side. Also had cortisone injection the same day as she started the Augmentin.  Other reaction(s): Facial swelling  Other reaction(s): Facial swelling    Hydrocodone Nausea and Vomiting and GI Disturbance     vomiting for days, PN: vomiting for days; HUT Comment: vomiting for days; HUT Reaction: Gastrointestinal; HUT Reaction: Nausea And Vomiting; HUT Reaction Type: Intolerance; HUT Severity: Med; HUT Noted: [id number]  vomiting for days    Other reaction(s): Rash    Hydrocodone-Acetaminophen Nausea and Vomiting and Rash     Update on 12/12  Pt says she can take tylenol just not the  hydrocodone.   Other reaction(s): Rash      Influenza Vaccines Other (See Comments) and Nausea and Vomiting     HUT Reaction: Nausea And Vomiting; HUT Reaction Type: Intolerance; HUT Severity: Low; HUT Noted: [id number]    Latex Rash     HUT Reaction: Rash; HUT Reaction Type: Allergy; HUT Severity: Low; HUT Noted: [id number]  Other reaction(s): Rash      Oseltamivir Hives     med stopped, PN: med stopped  med stopped, PN: med stopped; HUT Comment: med stopped, PN: med stopped; HUT Reaction: Hives; HUT Reaction Type: Allergy; HUT Severity: Med; HUT Noted: [id number]    Penicillins Anaphylaxis     HUT Reaction: Anaphylaxis; HUT Reaction Type: Allergy; HUT Severity: High; HUT Noted: [id number]    Vancomycin Itching, Swelling and Rash     Other reaction(s): Redness  Flushed face, very itchy; HUT Comment: Flushed face, very itchy; HUT Reaction: Angioedema; HUT Reaction: Redness; HUT Severity: Med; HUT Noted: [id number]    facial    Blood-Group Specific Substance Other (See Comments)     Patient has an anti-Cw and non-specific antibodies. Blood product orders may be delayed. Draw one red top and two purple top tubes for all type/screen/crossmatch orders.  Patient has anti-Cw, anti-K (Angella), Warm auto and nonspecific antibodies. Blood products may be delayed. Draw patient 24 hours prior to transfusion. Draw one red top and two purple top tubes for all type and screen orders.    Clavulanic Acid Angioedema    Eggshell Membrane (Chicken) [Egg Shells]     Fentanyl Itching    Gluten Meal     Haemophilus B Polysaccharide Vaccine Nausea and Vomiting    Lactose     Naltrexone Other (See Comments)     Reaction(s): Vivid dreams.    Other Drug Allergy (See Comments)      See original file MRN [id number]. Files are marked for merge    Oxycodone Swelling    Adhesive Tape Rash     Silicone type  Silicone type    Other reaction(s): adhesive allergy  Other reaction(s): adhesive allergy  Silicone type    Other reaction(s): adhesive allergy       Band-Aid Anti-Itch      Other reaction(s): adhesive allergy    Cephalosporins Rash    Lamotrigine Rash     Possibly associated with Lamictal.   HUT Comment: Possibly associated with Lamictal. ; HUT Reaction: Rash; HUT Reaction Type: Allergy; HUT Severity: Low; HUT Noted: [id number]    No Clinical Screening - See Comments Rash and Other (See Comments)     Silicone type  Silicone type  See original file MRN [id number]. Files are marked for merge  History of swallowing sharp metallic objects. She should not be prescribed lancets due to posed risk of swallowing.      Family History   Problem Relation Age of Onset    Diabetes Type 2  Maternal Grandmother     Diabetes Type 2  Paternal Grandmother     Breast Cancer Paternal Grandmother     Cerebrovascular Disease Father 53    Myocardial Infarction No family hx of     Coronary Artery Disease Early Onset No family hx of     Ovarian Cancer No family hx of     Colon Cancer No family hx of     Depression Mother     Anxiety Disorder Mother      Social History:  Social History     Socioeconomic History    Marital status: Single     Spouse name: Not on file    Number of children: Not on file    Years of education: Not on file    Highest education level: Not on file   Occupational History    Occupation: On disability   Tobacco Use    Smoking status: Never    Smokeless tobacco: Never   Vaping Use    Vaping Use: Not on file   Substance and Sexual Activity    Alcohol use: No     Alcohol/week: 0.0 standard drinks of alcohol    Drug use: No    Sexual activity: Not Currently     Partners: Male     Birth control/protection: I.U.D.     Comment: IUD - Mirena - placed July, 2015   Other Topics Concern    Parent/sibling w/ CABG, MI or angioplasty before 65F 55M? Not Asked   Social History Narrative    Single.    Living in independent living portion of [employer] [facility].    On disability.    No regular exercise.      Social Determinants of Health     Financial Resource Strain: Not on file    Food Insecurity: Not on file   Transportation Needs: Not on file   Physical Activity: Not on file   Stress: Not on file   Social Connections: Not on file   Interpersonal Safety: Not on file   Housing Stability: Not on file         Vital Signs:  /65 (BP Location: Right arm)   Pulse 88   Temp 97.8  F (36.6  C) (Oral)   Resp 16   LMP 07/12/2023   SpO2 96%       Neurological examination  Mental Status: The patient is alert and oriented. Recent memory is normal. The person is  attentive with normal concentration. Language is fluent. Speech is of normal vera and  character. The speech is nondysarthric. Fund of knowledge appears normal  Cranial Nerve I: Not tested.  Cranial Nerve II: Equally round and reactive to light.  Cranial Nerves III, IV, VI: The extraocular movements are full in all directions of gaze without  ophthalmoplegia or nystagmus.  Cranial Nerve V: Light touch is intact and symmetric in the V1, V2, V3 divisions of both  trigeminal nerves.  Cranial Nerve VII: Facial movements are symmetric.  Cranial Nerve XI: Normal shoulder shrug.  Motor: Muscle Strength: The strength was 5/5 in upper and 1/5 in lower extremities bilaterally.  Reflexes: The reflexes are 0/4 for biceps, patellar tendon reflexes bilaterally and  symmetrically.  Sensory: The sensory examination is normal for light touch bilaterally and symmetrically.  Cerebellar: The cerebellar examination is normal to finger to nose test.    Review of Diagnostics:  I personally reviewed and interpreted the following:    MR Thoracic Spine w/o & w Contrast    Result Date: 3/30/2024  EXAM: MR THORACIC SPINE W/O and W CONTRAST LOCATION: [facility] DATE: 3/30/2024 INDICATION: back pain, unable to move legs COMPARISON: None. CONTRAST: 11ml shree TECHNIQUE: Routine Thoracic Spine MRI without and with IV contrast. FINDINGS: Normal vertebral body heights.  No concerning bone marrow lesions.  No abnormal cord signal. No cord  pathologic enhancement. No significant subluxations. Upper thoracic spine mild to moderate dextro scoliosis. Lower thoracic spine mild to moderate levoscoliosis. Mild multilevel degenerative disc disease. Mild multilevel facet arthropathy. Several mild bulges.  No significant thecal sac stenosis. T6-T7: Moderate left foraminal stenosis. T7-T8: Mild-to-moderate left foraminal stenosis. T8-T9: Moderate right foraminal stenosis. Remaining levels demonstrate no greater than mild neural foraminal stenosis OTHER: No significant extraspinal findings.      IMPRESSION: 1.  No abnormal cord signal. No cord pathologic enhancement. 2.  Mild multilevel spondylosis. 3.  No significant thecal sac stenosis.    MR Lumbar Spine w/o & w Contrast    Result Date: 3/30/2024  EXAM: MR LUMBAR SPINE W/O and W CONTRAST LOCATION: [facility] DATE: 3/30/2024 INDICATION: back pain, unable to move legs COMPARISON: None. CONTRAST: 11 mL Gadavist TECHNIQUE: Routine Lumbar Spine MRI without and with IV contrast. FINDINGS: Transitional lumbosacral anatomy. Last well-formed disc space labeled L5-S1 with right L5 partial sacralization. Right L5 partial sacralization demonstrates a pseudoarthrosis with adjacent fatty marrow changes.  Normal vertebral body heights.  No concerning bone marrow lesions.  Mid to lower lumbar spinal canal partially fatty filum. Unremarkable visualized bony pelvis.  No significant subluxations. Lumbar mild to moderate dextro scoliosis. Essentially normal disc space height and signal. No significant degenerative disc disease. No significant facet arthropathy. No significant bulge or posterior disc protrusion. No significant thecal sac stenosis. No significant neural foraminal stenosis. Cauda equina nerve roots appear mildly enlarged as well as the lower lumbar spine and sacral exiting nerve roots. Some of these nerve roots demonstrate thin nonnodular enhancement. Findings may reflect chronic inflammatory  demyelinating polyneuropathy, Charcot-Monique-Tooth, neurofibromatosis type I. Enhancement could suggest acute infectious inflammatory process superimposed on chronic findings. No abnormal conus signal. No conus pathologic enhancement. Conus terminates at L2. EXTRASPINAL FINDINGS: No significant findings.      IMPRESSION: 1.  Cauda equina nerve roots appear mildly enlarged as well as the lower lumbar spine and sacral exiting nerve roots. Some of these nerve roots demonstrate thin nonnodular enhancement. Findings may reflect chronic inflammatory demyelinating polyneuropathy, Charcot-Monique-Tooth, neurofibromatosis type I. Enhancement could suggest acute infectious inflammatory process superimposed on chronic findings. 2.  Transitional lumbosacral anatomy described above. 3.  No significant degenerative changes. 4.  No significant thecal sac stenosis. No significant neural foraminal stenosis.    Images reviewed personally      Vitamin B12:   Lab Results   Component Value Date    B12 203 02/11/2022    B12 279 03/21/2018         Complete Blood Count:    Recent Labs   Lab Test 03/31/24  0740 03/30/24  1504 03/29/24  1242 03/13/24  2048   WBC 6.6 9.5 11.2* 10.4   RBC 4.39 4.50 4.77 4.41   HGB 13.4 13.9 14.4 13.7   HCT 40.7 42.5 42.8 40.5    220 294 302   LYMPH  --  28 7 18        Thyroid Stimulating Hormone:   Lab Results   Component Value Date    TSH 4.47 06/27/2023    TSH 4.94 02/11/2022    TSH 3.19 11/30/2020        Recent Labs   Lab Test 03/31/24  0740 03/30/24  1504 01/31/23  0651 01/15/23  1625 01/05/23  1905 12/28/22  0742   WBC 6.6 9.5   < >  --    < > 5.5   HGB 13.4 13.9   < >  --    < > 12.1   MCV 93 94   < >  --    < > 89    220   < >  --    < > 249   INR  --   --   --  1.11  --  1.06    < > = values in this interval not displayed.      Recent Labs   Lab Test 03/31/24  0740 03/30/24  1504    141   POTASSIUM 4.2 3.8   CHLORIDE 107 107   CO2 25 22   BUN 15.5 11.5   CR 0.79 0.69   ANIONGAP 8 12    KELBY 9.1 9.4   * 101*     CMP:  Recent Labs   Lab 03/29/24  1242   PROTTOTAL 7.2   ALBUMIN 4.4   ALKPHOS 82   AST 17   ALT 26   BILITOTAL 0.3   LIPASE 43       Impression and Plan:  Ms. 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    Plan  -MRI lumbar spine completed showing enlargement of cauda equina nerve roots and enhancement.  This is suggestive of CIDP and recommend treatment with IVIG but she did not have benefit in the past so lets plan for plasma exchange.  If possible would be nice to get lumbar puncture to check for albumin cytologic dissociation.  Please check protein, glucose and cell count  If delay in transfer can to higher center for plasma exchange can consider using 1 g Solu-Medrol per day until she starts plasma exchange.  -I will order GQ1B antibody testing as well.  -Anxiety and depression: Recommend psychiatry consult.  Was seen yesterday by psychotherapy.      Rachel Santos MD  Neurology    Thank you very much for allowing me to participate in the care of this patient.

## 2024-03-31 NOTE — ED NOTES
Attempted to walk patient with assist of 2 staff and walker.  Patient unable to assist in moving legs off bed.  Was able to get into standing position with STRONG assist of 2 staff and walker, unable to take any steps, returned to bed with assist of staff.  Patient now rating pain 8/10 in lower back that radiates around to groin.  MD updated.

## 2024-03-31 NOTE — H&P
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  Concerning MRI findings noting prominence of the cauda equina nerve roots but this was discussed with neurosurgery and less likely the cause of patient's symptoms has not changed from past MRIs.  Workup and treatment per below.    PLAN    Acute Lower Extremity Paresis  -Presents with 12hrs of severe weakness and pain in lower extremities  -Hx of CIDP although this only caused patient neuropathy in the past  -On exam, 2/5 strength in bilateral lower extremities  -MRI of lumbar spine with evidence of mildly enlarged cauda equina nerve roots although discussed with NS and appears similar to previous  -Unclear if this is related to CIDP, GBS, or psych (hx of multiple psych disorders, mental health saw patient in ED and feels this is less likely)  -Case discussed with neurology, recommend LP although cannot obtain tonight due to body habitus  -Neurology also recommending transfer to [facility] or [facility] if bed available to ensure plasmapheresis availability  PLAN  -Transfer to [facility] or [facility] is planned but will admit here if no beds  -LP and studies ordered with radiology  -Neurology consultation  -As needed pain regimen available  -Physical therapy consultation    Other Issues  -Morbid obesity BMI 45: Complicates all aspects of care  -MDD: Home medications once verified  -CANDICE: Home medications once verified    DVT Prophy  -SCDs    Disposition  -Transfer to [facility] or [facility] if bed available otherwise we will admit here      Jordan Iraheta MD    History of Present Illness     Nevin ZAN Jhonnyjuana is a 33 year old with history  of morbid obesity BMI 45 on Wegovy, CANDICE, MDD, Bipolar Disorder, and 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  Patient says that this morning when she woke up and tried to get out of bed, started experiencing severe lower extremity weakness.  She is unsure if the weakness started in her feet or more proximal as all of the weakness she woke up with.  Also having pain that starts in the thighs and goes up into her lower back.  Denies numbness or tingling in the legs.  Denies weakness, numbness, or tingling in the arms.  Weakness in the legs or bilateral and equal.  Of note, patient recently had EGD completed 03/29 for workup of abdominal pain after eating.  History of CIDP that cause neuropathy and used to get IVIG for treatment of this.  Has not gotten IVIG for the past 4 years as symptoms have been controlled with Lyrica.  Recent viral infection in January when she had influenza.  In the ED, VSS.  Labs largely within normal limits.  MRI of the thoracic and lumbar spine with evidence of mildly enlarged cauda equina nerve roots.  This was discussed with neurosurgery who reviewed past MRI imaging and findings were the same as past images.  Do not believe patient's symptoms are surgical and recommended neurology consultation.  Case discussed with general neurology who suspect patient's symptoms may be due to CIDP but recommended lumbar puncture.  Lumbar puncture cannot be completed in the ED due to patient's body habitus.  Neurology also recommended patient transfer to [facility] or [facility] but unclear if beds will be available.    Review of Systems     A Comprehensive greater than 10 system review of systems was carried out.  Pertinent positives and negatives are noted above.  Otherwise negative for contributory information.     Past Medical History     Past Medical History:   Diagnosis Date    ADD (attention deficit disorder)     Anorexia nervosa with bulimia (H28)     history  of; on Topamax    Anxiety     Asthma     Borderline personality disorder (H)     Depression     Depressive disorder     Diabetes (H)     Eating disorder     H/O self-harm     h/o Suicide attempt 02/21/2018    History of pulmonary embolism 12/2019    Provoked. Completed 3 month course of Apixaban    Morbid obesity     Neuropathy     Obesity     PTSD (post-traumatic stress disorder)     Pulmonary emboli (H)     Rectal foreign body - Recurrent issue, self placed     Self-injurious behavior     hx swallowing nonfood items such as mickie pins    Sleep apnea     uses cpap    Suicidal overdose (H)     Swallowed foreign body - Recurrent issue, self placed     Syncope      Medications     Current Outpatient Medications   Medication Sig Dispense Refill    acetaminophen (TYLENOL) 500 MG tablet Take 2 tablets (1,000 mg) by mouth every 6 hours as needed for pain or fever 60 tablet 0    albuterol (PROAIR HFA/PROVENTIL HFA/VENTOLIN HFA) 108 (90 Base) MCG/ACT inhaler Inhale 2 puffs into the lungs every 6 hours as needed for shortness of breath / dyspnea or wheezing 18 g 0    albuterol (PROVENTIL) (2.5 MG/3ML) 0.083% neb solution Take 1 vial (2.5 mg) by nebulization every 6 hours as needed for shortness of breath or wheezing 90 mL 0    benzonatate (TESSALON) 100 MG capsule Take 1 capsule (100 mg) by mouth 3 times daily as needed for cough 12 capsule 0    cetirizine (ZYRTEC) 10 MG tablet Take 1 tablet (10 mg) by mouth daily 30 tablet 0    Cholecalciferol (D3 HIGH POTENCY) 25 MCG (1000 UT) CAPS Take 50 mcg by mouth daily      clonazePAM (KLONOPIN) 0.5 MG tablet Take 0.5mg daily PRN anxiety- 20 tablets per month, try to keep it to 3-4x per week      cyclobenzaprine (FLEXERIL) 10 MG tablet Take 1 tablet (10 mg) by mouth 3 times daily as needed for muscle spasms 20 tablet 0    ferrous sulfate (FEROSUL) 325 (65 Fe) MG tablet Take 1 tablet (325 mg) by mouth daily (with breakfast) 30 tablet 0    montelukast (SINGULAIR) 10 MG tablet Take 10  mg by mouth every evening      norethindrone (AYGESTIN) 5 MG tablet Take 5 mg by mouth daily      ondansetron (ZOFRAN-ODT) 4 MG ODT tab Take 1 tablet (4 mg) by mouth every 8 hours as needed for nausea 15 tablet 0    pantoprazole (PROTONIX) 40 MG EC tablet Take 40 mg by mouth daily      polyethylene glycol (MIRALAX) 17 GM/Dose powder Take 17 g by mouth daily as needed for constipation      pregabalin (LYRICA) 100 MG capsule Take 100 mg by mouth every morning      pregabalin (LYRICA) 100 MG capsule Take 200 mg by mouth at bedtime      PSYLLIUM PO Take 1 tsp by mouth daily      Semaglutide-Weight Management (WEGOVY) 1 MG/0.5ML pen Inject 1 mg Subcutaneous once a week 2 mL 3    valACYclovir (VALTREX) 1000 mg tablet Take 2,000 mg by mouth 2 times daily as needed Take 2 tablets by mouth two times daily for one day. Use as needed at onset of cold sore.      Respiratory Therapy Supplies (NEBULIZER) BRENDAN Nebulizer device.  Albuterol nebulization every 2 hours as needed for shortness of breath or wheezing. 1 each 0      Past Surgical History   Multiple EGDs    Family History     Family History   Problem Relation Age of Onset    Diabetes Type 2  Maternal Grandmother     Diabetes Type 2  Paternal Grandmother     Breast Cancer Paternal Grandmother     Cerebrovascular Disease Father 53    Myocardial Infarction No family hx of     Coronary Artery Disease Early Onset No family hx of     Ovarian Cancer No family hx of     Colon Cancer No family hx of     Depression Mother     Anxiety Disorder Mother      Allergies     Allergies   Allergen Reactions    Amoxicillin-Pot Clavulanate Other (See Comments), Swelling and Rash     PN: facial swelling, left side. Also had cortisone injection the same day as she started the Augmentin.  Noted in downtime recovery of chart.    PN: facial swelling, left side. Also had cortisone injection the same day as she started the Augmentin.; [facility] Comment: PN: facial swelling, left side. Also had cortisone  injection the same day as she started the Augmentin.Noted in downtime recovery of chart.; HUT Reaction: Rash; HUT Reaction: Unknown; HUT Reaction Type: Allergy; HUT Severity: Med; HUT Noted: [id number]  PN: facial swelling, left side. Also had cortisone injection the same day as she started the Augmentin.  Other reaction(s): *Unknown  PN: facial swelling, left side. Also had cortisone injection the same day as she started the Augmentin.  Noted in downtime recovery of chart.  PN: facial swelling, left side. Also had cortisone injection the same day as she started the Augmentin.  Other reaction(s): Facial swelling  Other reaction(s): Facial swelling    Hydrocodone Nausea and Vomiting and GI Disturbance     vomiting for days, PN: vomiting for days; HUT Comment: vomiting for days; HUT Reaction: Gastrointestinal; HUT Reaction: Nausea And Vomiting; HUT Reaction Type: Intolerance; HUT Severity: Med; HUT Noted: [id number]  vomiting for days    Other reaction(s): Rash    Hydrocodone-Acetaminophen Nausea and Vomiting and Rash     Update on 12/12  Pt says she can take tylenol just not the hydrocodone.   Other reaction(s): Rash      Influenza Vaccines Other (See Comments) and Nausea and Vomiting     HUT Reaction: Nausea And Vomiting; HUT Reaction Type: Intolerance; HUT Severity: Low; HUT Noted: [id number]    Latex Rash     HUT Reaction: Rash; HUT Reaction Type: Allergy; HUT Severity: Low; HUT Noted: [id number]  Other reaction(s): Rash      Oseltamivir Hives     med stopped, PN: med stopped  med stopped, PN: med stopped; HUT Comment: med stopped, PN: med stopped; HUT Reaction: Hives; HUT Reaction Type: Allergy; HUT Severity: Med; HUT Noted: [id number]    Penicillins Anaphylaxis     HUT Reaction: Anaphylaxis; HUT Reaction Type: Allergy; HUT Severity: High; HUT Noted: [id number]    Vancomycin Itching, Swelling and Rash     Other reaction(s): Redness  Flushed face, very itchy; HUT Comment: Flushed face, very itchy; HUT Reaction: Angioedema;  HUT Reaction: Redness; HUT Severity: Med; HUT Noted: [id number]    facial    Blood-Group Specific Substance Other (See Comments)     Patient has an anti-Cw and non-specific antibodies. Blood product orders may be delayed. Draw one red top and two purple top tubes for all type/screen/crossmatch orders.  Patient has anti-Cw, anti-K (Angella), Warm auto and nonspecific antibodies. Blood products may be delayed. Draw patient 24 hours prior to transfusion. Draw one red top and two purple top tubes for all type and screen orders.    Clavulanic Acid Angioedema    Fentanyl Itching    Haemophilus B Polysaccharide Vaccine Nausea and Vomiting    Naltrexone Other (See Comments)     Reaction(s): Vivid dreams.    Other Drug Allergy (See Comments)      See original file MRN [id number]. Files are marked for merge    Oxycodone Swelling    Adhesive Tape Rash     Silicone type  Silicone type    Other reaction(s): adhesive allergy  Other reaction(s): adhesive allergy  Silicone type    Other reaction(s): adhesive allergy      Band-Aid Anti-Itch      Other reaction(s): adhesive allergy    Cephalosporins Rash    Lamotrigine Rash     Possibly associated with Lamictal.   HUT Comment: Possibly associated with Lamictal. ; HUT Reaction: Rash; HUT Reaction Type: Allergy; HUT Severity: Low; HUT Noted: [id number]    No Clinical Screening - See Comments Rash and Other (See Comments)     Silicone type  Silicone type  See original file MRN [id number]. Files are marked for merge  History of swallowing sharp metallic objects. She should not be prescribed lancets due to posed risk of swallowing.      Social History     Social History     Tobacco Use    Smoking status: Never    Smokeless tobacco: Never   Substance Use Topics    Alcohol use: No     Alcohol/week: 0.0 standard drinks of alcohol     Physical Exam   Blood pressure (!) 132/91, pulse 76, temperature 98.1  F (36.7  C), resp. rate 16, last menstrual period 07/12/2023, SpO2 96%, not currently  breastfeeding.    General: Ill appearing, cooperative with exam, in NAD.  HEENT: Atraumatic. No erythema in posterior pharynx.  Lymph: No cervical or inguinal lymphadenopathy.  Cardiac: RRR. No murmurs.  Lungs: CTAB. Nl WOB.  Abd: Non-tender. No rebound or gaurding. Nl bowel sounds.  Ext: No edema. 2+ pulses.  Skin: No rashes, abrasions, or contusions.  Psych: A&Ox3. Nl affect.  Neuro: 2/5 strength in legs bilaterally (only able to wiggle toes).  Diminished reflexes, sensation intact.    Labs & Imaging     Reviewed and Pertinent results discussed in assessment and plan.

## 2024-03-31 NOTE — ED PROVIDER NOTES
Emergency Department                         Assumed Care Note      Patient signed out to me by Dr Ambrocio.    Briefly, Nevin Alvarado is a 33 year old female is being evaluated for generalized weakness and abdominal pain.  Patient has been unable to ambulate due to lower extremity weakness while in the ER.    BP (!) 118/93   Pulse 77   Temp 98.1  F (36.7  C)   Resp 16   LMP 07/12/2023   SpO2 98%     Thus far, studies reveal:     Labs Ordered and Resulted from Time of ED Arrival to Time of ED Departure   BASIC METABOLIC PANEL - Abnormal       Result Value    Sodium 141      Potassium 3.8      Chloride 107      Carbon Dioxide (CO2) 22      Anion Gap 12      Urea Nitrogen 11.5      Creatinine 0.69      GFR Estimate >90      Calcium 9.4      Glucose 101 (*)    ROUTINE UA WITH MICROSCOPIC REFLEX TO CULTURE - Abnormal    Color Urine Light Yellow      Appearance Urine Clear      Glucose Urine Negative      Bilirubin Urine Negative      Ketones Urine Negative      Specific Gravity Urine 1.021      Blood Urine Negative      pH Urine 6.0      Protein Albumin Urine Negative      Urobilinogen Urine Normal      Nitrite Urine Negative      Leukocyte Esterase Urine Negative      Bacteria Urine Few (*)     RBC Urine 0      WBC Urine 0     CK TOTAL - Normal         CBC WITH PLATELETS AND DIFFERENTIAL    WBC Count 9.5      RBC Count 4.50      Hemoglobin 13.9      Hematocrit 42.5      MCV 94      MCH 30.9      MCHC 32.7      RDW 13.1      Platelet Count 220      % Neutrophils 65      % Lymphocytes 28      % Monocytes 7      % Eosinophils 0      % Basophils 0      % Immature Granulocytes 0      NRBCs per 100 WBC 0      Absolute Neutrophils 6.1      Absolute Lymphocytes 2.6      Absolute Monocytes 0.6      Absolute Eosinophils 0.0      Absolute Basophils 0.0      Absolute Immature Granulocytes 0.0      Absolute NRBCs 0.0     RBC AND PLATELET MORPHOLOGY    Platelet Assessment         Value: Automated Count Confirmed. Platelet morphology is normal.    Acanthocytes        Fito Rods        Basophilic Stippling        Bite Cells        Blister Cells        Refugio Cells        Elliptocytes        Hgb C Crystals        Alicea-Jolly Bodies        Hypersegmented Neutrophils        Polychromasia        RBC agglutination        RBC Fragments        Reactive Lymphocytes        Rouleaux        Sickle Cells        Smudge Cells        Spherocytes        Stomatocytes        Target Cells        Teardrop Cells        Toxic Neutrophils        RBC Morphology Confirmed RBC Indices         Abdomen XR, 2 vw, flat and upright   Final Result   IMPRESSION: Scoliosis. Surgical clips in the gallbladder fossa. Abdomen otherwise negative. No free air. Nothing for obstruction.      XR Chest 2 Views   Final Result   IMPRESSION: No acute new findings. Mild scoliosis.      MR Thoracic Spine w/o & w Contrast    (Results Pending)   MR Lumbar Spine w/o & w Contrast    (Results Pending)       Pending studies include: MRI spine and DEC evaluation.     Plan from sign out is: If MRI negative and cleared by DEC, still can't walk, admit to hospitalist service.    Progress notes:  ED Course as of 03/31/24 0142   Sat Mar 30, 2024   1950 Notified by [facility] that there is no DEC  until 5 AM tomorrow morning.   2015 I spoke with Dilcia Mcguire PA-C, who noted that if the patient cannot walk on ambulation trial, they will accept patient for admission.   2019 MR Lumbar Spine w/o & w Contrast   Cauda equina nerve roots appear mildly enlarged as well as the lower lumbar spine and sacral exiting nerve roots. Some of these nerve roots demonstrate thin nonnodular enhancement. Findings may reflect chronic inflammatory demyelinating   polyneuropathy, Charcot-Monique-Tooth, neurofibromatosis type I. Enhancement could suggest acute infectious inflammatory process superimposed on chronic findings.   2040 I spoke with neurosurgery REUBEN who reviewed  "patient history including prior imaging, H&P and progress note from 5/24/2023. They do not believe there is any acute neurosurgical intervention warranted. They recommend general neurology consultation.   2041 Marianela CARR: Patient is cleared from DEC standpoint. No indication for inpatient psychiatry.   2050 I rechecked the patient who notes when she moves her legs it hurts and she cannot walk. However, she reports she cannot move her legs today. Difficulty eliciting reflexes on exam. She notes her PCP told her there is \"nothing they can do for her\" when she was seen for these symptoms in the past. Prior to today, she was able to use her walker.   2105 Patient unable to ambulate per staff. Will consult hospitalist for admission.   2122 Discussed patient with hospitalist Dr. Iraheta for admission. He however requests that I speak with neurology due to concern for potential need for plasmapheresis and there is no ability to do that at [facility]. He is hesitant to accept admission at [facility] I speak with neurologist.   2131 I consulted with Dr. Santos, neurology, [facility], regarding the patient. If prior IVIG is not helpful, patient could potentially be considered for plasma exchange, which would be able to be done at [facility] or [facility]. However, since patient has been seen by [facility] in past, he request that I see if there is bed availability for transfer to [place] for neurology evaluation there as they have seen the patient in the past and patient would benefit from continuity of care standpoint. However, if there is no bed availability at [facility] or [facility], he is okay with the patient being admitted to [facility] for neurology evaluation and potential retrial of IVIG.    2247 I consulted with hospitalist Dr. Grimm with [facility], regarding the patient's history and presentation here in the emergency department who accepted the patient for transfer admission to [facility]. " They request primary neurologist consult/admit as this is a primary neurological issue. Neurologist paged.   6459 I reviewed the neurology note from 5/24/2023 and she is a Dr. Cleveland patient on last admission.   2306 Discussed patient with [facility] on-call neurologist (Dr. Cleveland) who is familiar with the patient and admitted the patient last May. After review of patient's presentation/history, patient's imaging, latest outpatient neurology visit note on 3/18/2024, and prior admission note, Dr. Graham does not believe that transfer to [facility] is warranted at this time as he would not be considering plasma exchange or IVIG. He reports patient already has a fairly comprehensive outpatient care plan regarding her CIPD and believes this can be continued. He believes that patient would potentially have symptom improvement with adequate pain control and reassurance. Dr. Graham feels patient is appropriate for admission here at [facility] at this time and does not need transferred. I will page out to on-call neurologist again and on-call hospitalist regarding plan for admission here.     MR Lumbar Spine w/o & w Contrast   Final Result    IMPRESSION:   1.  Cauda equina nerve roots appear mildly enlarged as well as the lower lumbar spine and sacral exiting nerve roots. Some of these nerve roots demonstrate thin nonnodular enhancement. Findings may reflect chronic inflammatory demyelinating    polyneuropathy, Charcot-Monique-Tooth, neurofibromatosis type I. Enhancement could suggest acute infectious inflammatory process superimposed on chronic findings.      2.  Transitional lumbosacral anatomy described above.      3.  No significant degenerative changes.      4.  No significant thecal sac stenosis. No significant neural foraminal stenosis.      MR Thoracic Spine w/o & w Contrast   Final Result    IMPRESSION:   1.  No abnormal cord signal. No cord pathologic enhancement.      2.  Mild multilevel spondylosis.       3.  No significant thecal sac stenosis.      XR Lumbar Puncture Spinal Tap Diag    (Results Pending)         Clinical impression:  1. Acute midline back pain, unspecified back location    2. Bilateral leg weakness      Disposition:  Patient signed out to my colleague Dr. Silverio pending hospitalist/neurologist call back.    MILO QUARLES DO  3/30/2024     Milo Quarles DO  03/31/24 0143

## 2024-03-31 NOTE — PROGRESS NOTES
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  Concerning MRI findings noting prominence of the cauda equina nerve roots but this was discussed with neurosurgery and less likely the cause of patient's symptoms has not changed from past MRIs.  Workup and treatment per below.       Acute Lower Extremity Paresis  Rule out conversion disorder  HX CIDP w/abnormal MRI lumbar findings  -Presents with 12hrs of severe weakness and pain in lower extremities  -Hx of CIDP although this only caused patient neuropathy in the past w/MRI of lumbar spine with evidence of mildly enlarged cauda equina nerve roots   -my partner discussed with neurosurgery and MRI findings although abnormal appear similar to previous studies  -Unclear if this is related to CIDP, GBS, or conversion disorder  -my partner discussed with neurology, recommend LP although cannot obtain at this time due to body habitus. Discussed possible need for transfer if plasmaphoresis required but it's unclear if she will need this  -suspect this is more psychiatric in nature but await formal neurology evaluation. If LP still desired can attempt to get tomorrow with IR. Patient was seen by psychotherapy trainee in ED but will reconsult psychiatry as she needs formal physician evaluation to rule out conversion disorder.  -cont home lyrica. Tylenol, oral dilauded. Avoid IV narcotics with previous concerns for medication seeking behaviors. Avoid toradol with GI complaints    ? Urinary retention  -reporting urinary retention, PVR have been around 130 and 240 but unclear if these are true post  "voids as she is combative when questioned  -encouraged to void and will continue to monitor, if PVR persistently elevated consider leong but would trial straight cath first    Abd pain  Hx self injurious behavior w/foreign body ingestion  -over 35 endoscopies in last 4 years for foreign body extractions based on my review  -appears to be more chronic or subacute issue, seen at [facility] on 3/29 and had normal EGD then left AMA  -discussed her wegovy could be contributing (20% develop abd pain per review), however patient is adament this is not the cause  -Abd XR normal, resume home protonix     Under guardianship w/ED treatment plan  Lives in group home  Hx suspected medication seeking behavior  Frequent medical non compliance  Frequent utilization of healthcare system  -complicates care    Hx Morbid obesity BMI 45, MDD, CANDICE, bipolar disorder, borderline personality disorder, LE neuropathy, chronic pain  - based on med rec no longer on rexulti, sumatriptan or klonopin and not resumed  -on wegovy, adament this be restarted so will restart        Diet: No Lactose Diet  Snacks/Supplements Adult: Ensure Enlive; With Meals    DVT Prophylaxis: Pneumatic Compression Devices  Leong Catheter: Not present  Lines: None     Cardiac Monitoring: None  Code Status: Full Code        Disposition Plan   Await psychiatry and neurology evaluations, expect discharge back to group home         Alfred Vallejo,   Hospitalist Service  [facility]  Securely message with [employer] (more info)  Text page via [employer] Paging/Directory   ______________________________________________________________________    Interval History   Seen this am, she's emotionally labile and upset and asking to get her wegovy. Discussed this can cause abd pain but she is adament that it be resumed. Still reporting back to 'my whole spine\". Reports continued weakness in her legs and states she's unable to move them, unclear if due to pain or true " weakness.    Physical Exam   Vital Signs: Temp: 97.8  F (36.6  C) Temp src: Oral BP: 110/65 Pulse: 88   Resp: 16 SpO2: 96 % O2 Device: None (Room air)    Weight: 0 lbs 0 oz    Constitutional: alert, agitated and emotionally labile, morbidly obese  Eyes: pupils equal, round and reactive to light and conjunctiva normal  ENT: normocephalic, without obvious abnormality, atraumatic  Respiratory: no increased work of breathing, good air exchange, and clear to auscultation  Cardiovascular: regular rate and rhythm and no murmur noted  GI: normal bowel sounds, soft, obese abdomen. Reports pain to palpation of lower abdomen  Skin: no bruising or bleeding  Neurologic: alert, interactive, difficult exam as unlcear if giving full effort but decreased movement bilateral LE     45 MINUTES SPENT BY ME on the date of service doing chart review, history, exam, documentation & further activities per the note.      Data   ------------------------- PAST 24 HR DATA REVIEWED -----------------------------------------------    I have personally reviewed the following data over the past 24 hrs:    6.6  \   13.4   / 298     140 107 15.5 /  102 (H)   4.2 25 0.79 \       Imaging results reviewed over the past 24 hrs:   Recent Results (from the past 24 hour(s))   XR Chest 2 Views    Narrative    EXAM: XR CHEST 2 VIEWS  LOCATION: [facility]  DATE: 3/30/2024    INDICATION: chest pain, s p EGD  COMPARISON: 03/13/2024      Impression    IMPRESSION: No acute new findings. Mild scoliosis.   Abdomen XR, 2 vw, flat and upright    Narrative    EXAM: XR ABDOMEN 2 VIEWS  LOCATION: [facility]  DATE: 3/30/2024    INDICATION: abd pain  COMPARISON: CT 03/22/2024      Impression    IMPRESSION: Scoliosis. Surgical clips in the gallbladder fossa. Abdomen otherwise negative. No free air. Nothing for obstruction.   MR Thoracic Spine w/o & w Contrast    Narrative    EXAM: MR THORACIC SPINE W/O and W CONTRAST  LOCATION:   [facility]  DATE: 3/30/2024    INDICATION: back pain, unable to move legs  COMPARISON: None.  CONTRAST: 11ml shree  TECHNIQUE: Routine Thoracic Spine MRI without and with IV contrast.    FINDINGS:   Normal vertebral body heights.  No concerning bone marrow lesions.  No abnormal cord signal. No cord pathologic enhancement. No significant subluxations.    Upper thoracic spine mild to moderate dextro scoliosis. Lower thoracic spine mild to moderate levoscoliosis. Mild multilevel degenerative disc disease. Mild multilevel facet arthropathy. Several mild bulges.      No significant thecal sac stenosis.    T6-T7: Moderate left foraminal stenosis.  T7-T8: Mild-to-moderate left foraminal stenosis.  T8-T9: Moderate right foraminal stenosis.  Remaining levels demonstrate no greater than mild neural foraminal stenosis    OTHER:  No significant extraspinal findings.      Impression     IMPRESSION:  1.  No abnormal cord signal. No cord pathologic enhancement.    2.  Mild multilevel spondylosis.    3.  No significant thecal sac stenosis.   MR Lumbar Spine w/o & w Contrast    Narrative    EXAM: MR LUMBAR SPINE W/O and W CONTRAST  LOCATION: [facility]  DATE: 3/30/2024    INDICATION: back pain, unable to move legs  COMPARISON: None.  CONTRAST: 11 mL Gadavist  TECHNIQUE: Routine Lumbar Spine MRI without and with IV contrast.    FINDINGS:   Transitional lumbosacral anatomy. Last well-formed disc space labeled L5-S1 with right L5 partial sacralization. Right L5 partial sacralization demonstrates a pseudoarthrosis with adjacent fatty marrow changes.  Normal vertebral body heights.  No   concerning bone marrow lesions.  Mid to lower lumbar spinal canal partially fatty filum. Unremarkable visualized bony pelvis.  No significant subluxations.    Lumbar mild to moderate dextro scoliosis. Essentially normal disc space height and signal. No significant degenerative disc disease. No significant facet  arthropathy. No significant bulge or posterior disc protrusion. No significant thecal sac stenosis.   No significant neural foraminal stenosis.    Cauda equina nerve roots appear mildly enlarged as well as the lower lumbar spine and sacral exiting nerve roots. Some of these nerve roots demonstrate thin nonnodular enhancement. Findings may reflect chronic inflammatory demyelinating polyneuropathy,   Charcot-Monique-Tooth, neurofibromatosis type I. Enhancement could suggest acute infectious inflammatory process superimposed on chronic findings.    No abnormal conus signal. No conus pathologic enhancement. Conus terminates at L2.    EXTRASPINAL FINDINGS:  No significant findings.      Impression     IMPRESSION:  1.  Cauda equina nerve roots appear mildly enlarged as well as the lower lumbar spine and sacral exiting nerve roots. Some of these nerve roots demonstrate thin nonnodular enhancement. Findings may reflect chronic inflammatory demyelinating   polyneuropathy, Charcot-Monique-Tooth, neurofibromatosis type I. Enhancement could suggest acute infectious inflammatory process superimposed on chronic findings.    2.  Transitional lumbosacral anatomy described above.    3.  No significant degenerative changes.    4.  No significant thecal sac stenosis. No significant neural foraminal stenosis.

## 2024-03-31 NOTE — ED NOTES
[facility]  ED Nurse Handoff Report    ED Chief complaint: Generalized Weakness  . ED Diagnosis:   Final diagnoses:   Acute midline back pain, unspecified back location   Bilateral leg weakness       Allergies:   Allergies   Allergen Reactions    Amoxicillin-Pot Clavulanate Other (See Comments), Swelling and Rash     PN: facial swelling, left side. Also had cortisone injection the same day as she started the Augmentin.  Noted in downtime recovery of chart.    PN: facial swelling, left side. Also had cortisone injection the same day as she started the Augmentin.; HUT Comment: PN: facial swelling, left side. Also had cortisone injection the same day as she started the Augmentin.Noted in downtime recovery of chart.; HUT Reaction: Rash; HUT Reaction: Unknown; HUT Reaction Type: Allergy; HUT Severity: Med; HUT Noted: [id number]  PN: facial swelling, left side. Also had cortisone injection the same day as she started the Augmentin.  Other reaction(s): *Unknown  PN: facial swelling, left side. Also had cortisone injection the same day as she started the Augmentin.  Noted in downtime recovery of chart.  PN: facial swelling, left side. Also had cortisone injection the same day as she started the Augmentin.  Other reaction(s): Facial swelling  Other reaction(s): Facial swelling    Hydrocodone Nausea and Vomiting and GI Disturbance     vomiting for days, PN: vomiting for days; HUT Comment: vomiting for days; HUT Reaction: Gastrointestinal; HUT Reaction: Nausea And Vomiting; HUT Reaction Type: Intolerance; HUT Severity: Med; HUT Noted: [id number]  vomiting for days    Other reaction(s): Rash    Hydrocodone-Acetaminophen Nausea and Vomiting and Rash     Update on 12/12  Pt says she can take tylenol just not the hydrocodone.   Other reaction(s): Rash      Influenza Vaccines Other (See Comments) and Nausea and Vomiting     HUT Reaction: Nausea And Vomiting; HUT Reaction Type: Intolerance; HUT Severity: Low; HUT  Noted: [id number]    Latex Rash     HUT Reaction: Rash; HUT Reaction Type: Allergy; HUT Severity: Low; HUT Noted: [id number]  Other reaction(s): Rash      Oseltamivir Hives     med stopped, PN: med stopped  med stopped, PN: med stopped; HUT Comment: med stopped, PN: med stopped; HUT Reaction: Hives; HUT Reaction Type: Allergy; HUT Severity: Med; HUT Noted: [id number]    Penicillins Anaphylaxis     HUT Reaction: Anaphylaxis; HUT Reaction Type: Allergy; HUT Severity: High; HUT Noted: [id number]    Vancomycin Itching, Swelling and Rash     Other reaction(s): Redness  Flushed face, very itchy; HUT Comment: Flushed face, very itchy; HUT Reaction: Angioedema; HUT Reaction: Redness; HUT Severity: Med; HUT Noted: [id number]    facial    Blood-Group Specific Substance Other (See Comments)     Patient has an anti-Cw and non-specific antibodies. Blood product orders may be delayed. Draw one red top and two purple top tubes for all type/screen/crossmatch orders.  Patient has anti-Cw, anti-K (Angella), Warm auto and nonspecific antibodies. Blood products may be delayed. Draw patient 24 hours prior to transfusion. Draw one red top and two purple top tubes for all type and screen orders.    Clavulanic Acid Angioedema    Fentanyl Itching    Haemophilus B Polysaccharide Vaccine Nausea and Vomiting    Naltrexone Other (See Comments)     Reaction(s): Vivid dreams.    Other Drug Allergy (See Comments)      See original file MRN [id number]. Files are marked for merge    Oxycodone Swelling    Adhesive Tape Rash     Silicone type  Silicone type    Other reaction(s): adhesive allergy  Other reaction(s): adhesive allergy  Silicone type    Other reaction(s): adhesive allergy      Band-Aid Anti-Itch      Other reaction(s): adhesive allergy    Cephalosporins Rash    Lamotrigine Rash     Possibly associated with Lamictal.   HUT Comment: Possibly associated with Lamictal. ; HUT Reaction: Rash; HUT Reaction Type: Allergy; HUT Severity: Low; HUT Noted:  [id number]    No Clinical Screening - See Comments Rash and Other (See Comments)     Silicone type  Silicone type  See original file MRN [id number]. Files are marked for merge  History of swallowing sharp metallic objects. She should not be prescribed lancets due to posed risk of swallowing.        Code Status: Full Code    Activity level - Baseline/Home:  independent.  Activity Level - Current:   assist of 2 and walker.   Lift room needed: Yes.   Bariatric: Yes   Needed: No   Isolation: No.   Infection: Not Applicable.     Respiratory status: Room air    Vital Signs (within 30 minutes):   Vitals:    03/30/24 1334 03/30/24 1430 03/30/24 1530 03/30/24 2028   BP: 125/76 (!) 136/92 (!) 118/93 (!) 132/91   Pulse:  74 77 76   Resp:       Temp:       SpO2: 97% 100% 98% 96%       Cardiac Rhythm:  ,      Pain level:    Patient confused: No.   Patient Falls Risk: nonskid shoes/slippers when out of bed, arm band in place, and patient and family education.   Elimination Status: Has voided     Patient Report - Initial Complaint: weakness/back pain/immobility.   Focused Assessment: Pt presents for complaint of generalized weakness and abdominal pain. Pt states she had an upper endoscopy yesterday. States since then she has been feeling increased weakness. States today \"I feel so weak I can't even swallow\" Pt controlling her airway without difficulty. Able to speak in full sentences. States she continues to have abdominal pain which has been on going for x1.5 months and is the reason she had the endoscopy done. IV placed by EMS during transport and report giving 100 mcg of fentanyl enroute. Pt reports minimal pain improvement. ABC intact, A&Ox4.     On arrival, patient reports less abdominal pain that mid/lower back pain that radiates to her groin.  Patient reports being unable to get out of bed today related to pain and weakness in her lets, states this is the main reason she called EMS today.  Patient has complex care " plan, see chart.  1:1 sitter implemented per care plan.     Abnormal Results:   Labs Ordered and Resulted from Time of ED Arrival to Time of ED Departure   BASIC METABOLIC PANEL - Abnormal       Result Value    Sodium 141      Potassium 3.8      Chloride 107      Carbon Dioxide (CO2) 22      Anion Gap 12      Urea Nitrogen 11.5      Creatinine 0.69      GFR Estimate >90      Calcium 9.4      Glucose 101 (*)    ROUTINE UA WITH MICROSCOPIC REFLEX TO CULTURE - Abnormal    Color Urine Light Yellow      Appearance Urine Clear      Glucose Urine Negative      Bilirubin Urine Negative      Ketones Urine Negative      Specific Gravity Urine 1.021      Blood Urine Negative      pH Urine 6.0      Protein Albumin Urine Negative      Urobilinogen Urine Normal      Nitrite Urine Negative      Leukocyte Esterase Urine Negative      Bacteria Urine Few (*)     RBC Urine 0      WBC Urine 0     CK TOTAL - Normal         CBC WITH PLATELETS AND DIFFERENTIAL    WBC Count 9.5      RBC Count 4.50      Hemoglobin 13.9      Hematocrit 42.5      MCV 94      MCH 30.9      MCHC 32.7      RDW 13.1      Platelet Count 220      % Neutrophils 65      % Lymphocytes 28      % Monocytes 7      % Eosinophils 0      % Basophils 0      % Immature Granulocytes 0      NRBCs per 100 WBC 0      Absolute Neutrophils 6.1      Absolute Lymphocytes 2.6      Absolute Monocytes 0.6      Absolute Eosinophils 0.0      Absolute Basophils 0.0      Absolute Immature Granulocytes 0.0      Absolute NRBCs 0.0     RBC AND PLATELET MORPHOLOGY    Platelet Assessment        Value: Automated Count Confirmed. Platelet morphology is normal.    Acanthocytes        Fito Rods        Basophilic Stippling        Bite Cells        Blister Cells        Refugio Cells        Elliptocytes        Hgb C Crystals        Alicea-Jolly Bodies        Hypersegmented Neutrophils        Polychromasia        RBC agglutination        RBC Fragments        Reactive Lymphocytes        Rouleaux         Sickle Cells        Smudge Cells        Spherocytes        Stomatocytes        Target Cells        Teardrop Cells        Toxic Neutrophils        RBC Morphology Confirmed RBC Indices          MR Lumbar Spine w/o & w Contrast   Final Result    IMPRESSION:   1.  Cauda equina nerve roots appear mildly enlarged as well as the lower lumbar spine and sacral exiting nerve roots. Some of these nerve roots demonstrate thin nonnodular enhancement. Findings may reflect chronic inflammatory demyelinating    polyneuropathy, Charcot-Monique-Tooth, neurofibromatosis type I. Enhancement could suggest acute infectious inflammatory process superimposed on chronic findings.      2.  Transitional lumbosacral anatomy described above.      3.  No significant degenerative changes.      4.  No significant thecal sac stenosis. No significant neural foraminal stenosis.      MR Thoracic Spine w/o & w Contrast   Final Result    IMPRESSION:   1.  No abnormal cord signal. No cord pathologic enhancement.      2.  Mild multilevel spondylosis.      3.  No significant thecal sac stenosis.      Abdomen XR, 2 vw, flat and upright   Final Result   IMPRESSION: Scoliosis. Surgical clips in the gallbladder fossa. Abdomen otherwise negative. No free air. Nothing for obstruction.      XR Chest 2 Views   Final Result   IMPRESSION: No acute new findings. Mild scoliosis.          Treatments provided: labs/imaging/pain control/DEC assessment  Family Comments: none present  OBS brochure/video discussed/provided to patient:  Yes  ED Medications:   Medications   ketorolac (TORADOL) injection 15 mg (15 mg Intravenous $Given 3/30/24 1340)   acetaminophen (TYLENOL) tablet 1,000 mg (1,000 mg Oral $Given 3/30/24 1516)   cyclobenzaprine (FLEXERIL) tablet 10 mg (10 mg Oral $Given 3/30/24 1632)   diazepam (VALIUM) injection 2.5 mg (2.5 mg Intravenous $Given 3/30/24 1705)   gadobutrol (GADAVIST) injection 11 mL (11 mLs Intravenous $Given 3/30/24 1900)   ibuprofen  (ADVIL/MOTRIN) tablet 600 mg (600 mg Oral $Given 3/30/24 2025)       Drips infusing:  No  For the majority of the shift this patient was Green.   Interventions performed were none.    Sepsis treatment initiated: No    Cares/treatment/interventions/medications to be completed following ED care: see orders.    ED Nurse Name: Lisa Zhang RN  8:59 PM   RECEIVING UNIT ED HANDOFF REVIEW    Above ED Nurse Handoff Report was reviewed: Yes  Reviewed by: Audra Bright RN on March 31, 2024 at 5:37 PM   I Sophy called the ED to inform them the note was read: No

## 2024-03-31 NOTE — ED PROVIDER NOTES
Patient signed out to me by Dr. Washington for neurology consultation.  [facility] on-call neurologist agrees that due to patient's complex medical history including the malingering and care plan history does not require transfer to [facility] at this time for plasmapheresis is likely this may be psychogenic.  Due to her known diagnosis of CIDP recommendations are to mid on observation for physical neurology consultation and decide if this is conversion disorder psychogenic or related to her CIDP underlying diagnosis.  Care was discussed with Dr. Pascale Shafer who does identify that there is a noted by Dr. Green for admission and patient was admitted to observation for neurologic consultation.     Reece Silverio MD  03/30/24 [id number]

## 2024-04-01 ENCOUNTER — APPOINTMENT (OUTPATIENT)
Dept: GENERAL RADIOLOGY | Facility: CLINIC | Age: 34
DRG: 074 | End: 2024-04-01
Attending: HOSPITALIST
Payer: COMMERCIAL

## 2024-04-01 ENCOUNTER — APPOINTMENT (OUTPATIENT)
Dept: GENERAL RADIOLOGY | Facility: CLINIC | Age: 34
DRG: 074 | End: 2024-04-01
Attending: INTERNAL MEDICINE
Payer: COMMERCIAL

## 2024-04-01 ENCOUNTER — HOSPITAL ENCOUNTER (INPATIENT)
Facility: CLINIC | Age: 34
LOS: 15 days | Discharge: HOME HEALTH | DRG: 074 | End: 2024-04-16
Attending: HOSPITALIST | Admitting: INTERNAL MEDICINE
Payer: COMMERCIAL

## 2024-04-01 VITALS
OXYGEN SATURATION: 98 % | BODY MASS INDEX: 46.49 KG/M2 | RESPIRATION RATE: 18 BRPM | WEIGHT: 252.65 LBS | HEIGHT: 62 IN | HEART RATE: 81 BPM | TEMPERATURE: 98.2 F | SYSTOLIC BLOOD PRESSURE: 135 MMHG | DIASTOLIC BLOOD PRESSURE: 76 MMHG

## 2024-04-01 DIAGNOSIS — G43.009 MIGRAINE WITHOUT AURA AND WITHOUT STATUS MIGRAINOSUS, NOT INTRACTABLE: ICD-10-CM

## 2024-04-01 DIAGNOSIS — F41.9 ANXIETY DISORDER, UNSPECIFIED TYPE: ICD-10-CM

## 2024-04-01 DIAGNOSIS — G61.81 CIDP (CHRONIC INFLAMMATORY DEMYELINATING POLYNEUROPATHY) (H): Primary | ICD-10-CM

## 2024-04-01 DIAGNOSIS — M54.9 ACUTE MIDLINE BACK PAIN, UNSPECIFIED BACK LOCATION: ICD-10-CM

## 2024-04-01 LAB
APPEARANCE CSF: CLEAR
COLOR CSF: COLORLESS
GLUCOSE CSF-MCNC: 60 MG/DL (ref 40–70)
PATH REPORT.COMMENTS IMP SPEC: NORMAL
PATH REPORT.FINAL DX SPEC: NORMAL
PATH REPORT.GROSS SPEC: NORMAL
PATH REPORT.MICROSCOPIC SPEC OTHER STN: NORMAL
PATH REPORT.RELEVANT HX SPEC: NORMAL
PHOTO IMAGE: NORMAL
PROT CSF-MCNC: 104.9 MG/DL (ref 15–45)
RBC # CSF MANUAL: 0 /UL (ref 0–2)
TUBE # CSF: 4
WBC # CSF MANUAL: 1 /UL (ref 0–5)

## 2024-04-01 PROCEDURE — 999N000065 XR CHEST PORT 1 VIEW

## 2024-04-01 PROCEDURE — 250N000013 HC RX MED GY IP 250 OP 250 PS 637: Performed by: HOSPITALIST

## 2024-04-01 PROCEDURE — 89050 BODY FLUID CELL COUNT: CPT | Performed by: INTERNAL MEDICINE

## 2024-04-01 PROCEDURE — 71045 X-RAY EXAM CHEST 1 VIEW: CPT

## 2024-04-01 PROCEDURE — 82945 GLUCOSE OTHER FLUID: CPT | Performed by: INTERNAL MEDICINE

## 2024-04-01 PROCEDURE — 84157 ASSAY OF PROTEIN OTHER: CPT | Performed by: INTERNAL MEDICINE

## 2024-04-01 PROCEDURE — 99223 1ST HOSP IP/OBS HIGH 75: CPT | Performed by: INTERNAL MEDICINE

## 2024-04-01 PROCEDURE — 87015 SPECIMEN INFECT AGNT CONCNTJ: CPT | Performed by: HOSPITALIST

## 2024-04-01 PROCEDURE — 88108 CYTOPATH CONCENTRATE TECH: CPT | Mod: 26 | Performed by: PATHOLOGY

## 2024-04-01 PROCEDURE — 120N000001 HC R&B MED SURG/OB

## 2024-04-01 PROCEDURE — 36569 INSJ PICC 5 YR+ W/O IMAGING: CPT

## 2024-04-01 PROCEDURE — 272N000448 HC KIT POWER PICC SOLO 5F TRIPLE LUMEN

## 2024-04-01 PROCEDURE — 250N000009 HC RX 250: Performed by: RADIOLOGY

## 2024-04-01 PROCEDURE — 88108 CYTOPATH CONCENTRATE TECH: CPT | Mod: TC | Performed by: HOSPITALIST

## 2024-04-01 PROCEDURE — 62328 DX LMBR SPI PNXR W/FLUOR/CT: CPT

## 2024-04-01 PROCEDURE — 99222 1ST HOSP IP/OBS MODERATE 55: CPT

## 2024-04-01 PROCEDURE — 250N000013 HC RX MED GY IP 250 OP 250 PS 637: Performed by: INTERNAL MEDICINE

## 2024-04-01 PROCEDURE — 99207 PR NO BILLABLE SERVICE THIS VISIT: CPT | Performed by: HOSPITALIST

## 2024-04-01 PROCEDURE — 87205 SMEAR GRAM STAIN: CPT | Performed by: HOSPITALIST

## 2024-04-01 PROCEDURE — 009U3ZX DRAINAGE OF SPINAL CANAL, PERCUTANEOUS APPROACH, DIAGNOSTIC: ICD-10-PCS | Performed by: RADIOLOGY

## 2024-04-01 RX ORDER — PANTOPRAZOLE SODIUM 40 MG/1
40 TABLET, DELAYED RELEASE ORAL DAILY
Status: DISCONTINUED | OUTPATIENT
Start: 2024-04-02 | End: 2024-04-16 | Stop reason: HOSPADM

## 2024-04-01 RX ORDER — NALOXONE HYDROCHLORIDE 0.4 MG/ML
0.2 INJECTION, SOLUTION INTRAMUSCULAR; INTRAVENOUS; SUBCUTANEOUS
Status: DISCONTINUED | OUTPATIENT
Start: 2024-04-01 | End: 2024-04-16 | Stop reason: HOSPADM

## 2024-04-01 RX ORDER — HYDROMORPHONE HYDROCHLORIDE 1 MG/ML
0.3 INJECTION, SOLUTION INTRAMUSCULAR; INTRAVENOUS; SUBCUTANEOUS EVERY 6 HOURS PRN
Status: DISCONTINUED | OUTPATIENT
Start: 2024-04-01 | End: 2024-04-15

## 2024-04-01 RX ORDER — POLYETHYLENE GLYCOL 3350 17 G/17G
17 POWDER, FOR SOLUTION ORAL DAILY PRN
Status: DISCONTINUED | OUTPATIENT
Start: 2024-04-01 | End: 2024-04-16 | Stop reason: HOSPADM

## 2024-04-01 RX ORDER — BENZONATATE 100 MG/1
100 CAPSULE ORAL 3 TIMES DAILY PRN
Status: DISCONTINUED | OUTPATIENT
Start: 2024-04-01 | End: 2024-04-16 | Stop reason: HOSPADM

## 2024-04-01 RX ORDER — FERROUS SULFATE 325(65) MG
325 TABLET ORAL
Status: DISCONTINUED | OUTPATIENT
Start: 2024-04-02 | End: 2024-04-16 | Stop reason: HOSPADM

## 2024-04-01 RX ORDER — ONDANSETRON 4 MG/1
4 TABLET, ORALLY DISINTEGRATING ORAL EVERY 6 HOURS PRN
Status: DISCONTINUED | OUTPATIENT
Start: 2024-04-01 | End: 2024-04-16 | Stop reason: HOSPADM

## 2024-04-01 RX ORDER — ONDANSETRON 2 MG/ML
4 INJECTION INTRAMUSCULAR; INTRAVENOUS EVERY 6 HOURS PRN
Status: DISCONTINUED | OUTPATIENT
Start: 2024-04-01 | End: 2024-04-16 | Stop reason: HOSPADM

## 2024-04-01 RX ORDER — CETIRIZINE HYDROCHLORIDE 10 MG/1
10 TABLET ORAL DAILY
Status: DISCONTINUED | OUTPATIENT
Start: 2024-04-02 | End: 2024-04-02

## 2024-04-01 RX ORDER — ALBUTEROL SULFATE 90 UG/1
2 AEROSOL, METERED RESPIRATORY (INHALATION) EVERY 6 HOURS PRN
Status: DISCONTINUED | OUTPATIENT
Start: 2024-04-01 | End: 2024-04-16 | Stop reason: HOSPADM

## 2024-04-01 RX ORDER — NORETHINDRONE ACETATE 5 MG
5 TABLET ORAL DAILY
Status: DISCONTINUED | OUTPATIENT
Start: 2024-04-02 | End: 2024-04-16 | Stop reason: HOSPADM

## 2024-04-01 RX ORDER — AMOXICILLIN 250 MG
2 CAPSULE ORAL 2 TIMES DAILY PRN
Status: DISCONTINUED | OUTPATIENT
Start: 2024-04-01 | End: 2024-04-16 | Stop reason: HOSPADM

## 2024-04-01 RX ORDER — PREGABALIN 100 MG/1
100 CAPSULE ORAL EVERY MORNING
Status: DISCONTINUED | OUTPATIENT
Start: 2024-04-02 | End: 2024-04-16 | Stop reason: HOSPADM

## 2024-04-01 RX ORDER — CHOLECALCIFEROL (VITAMIN D3) 50 MCG
50 TABLET ORAL DAILY
Status: DISCONTINUED | OUTPATIENT
Start: 2024-04-02 | End: 2024-04-16 | Stop reason: HOSPADM

## 2024-04-01 RX ORDER — NALOXONE HYDROCHLORIDE 0.4 MG/ML
0.4 INJECTION, SOLUTION INTRAMUSCULAR; INTRAVENOUS; SUBCUTANEOUS
Status: DISCONTINUED | OUTPATIENT
Start: 2024-04-01 | End: 2024-04-16 | Stop reason: HOSPADM

## 2024-04-01 RX ORDER — AMOXICILLIN 250 MG
1 CAPSULE ORAL 2 TIMES DAILY PRN
Status: DISCONTINUED | OUTPATIENT
Start: 2024-04-01 | End: 2024-04-16 | Stop reason: HOSPADM

## 2024-04-01 RX ORDER — LIDOCAINE 40 MG/G
CREAM TOPICAL
Status: DISCONTINUED | OUTPATIENT
Start: 2024-04-01 | End: 2024-04-16 | Stop reason: HOSPADM

## 2024-04-01 RX ORDER — ACETAMINOPHEN 500 MG
1000 TABLET ORAL EVERY 6 HOURS PRN
Status: DISCONTINUED | OUTPATIENT
Start: 2024-04-02 | End: 2024-04-15

## 2024-04-01 RX ORDER — CALCIUM CARBONATE 500 MG/1
1000 TABLET, CHEWABLE ORAL 4 TIMES DAILY PRN
Status: DISCONTINUED | OUTPATIENT
Start: 2024-04-01 | End: 2024-04-16 | Stop reason: HOSPADM

## 2024-04-01 RX ORDER — HYDROMORPHONE HYDROCHLORIDE 2 MG/1
2 TABLET ORAL EVERY 4 HOURS PRN
Status: DISCONTINUED | OUTPATIENT
Start: 2024-04-01 | End: 2024-04-05

## 2024-04-01 RX ORDER — CLONAZEPAM 0.5 MG/1
0.5 TABLET ORAL DAILY PRN
Status: DISCONTINUED | OUTPATIENT
Start: 2024-04-01 | End: 2024-04-16 | Stop reason: HOSPADM

## 2024-04-01 RX ORDER — MONTELUKAST SODIUM 10 MG/1
10 TABLET ORAL EVERY EVENING
Status: DISCONTINUED | OUTPATIENT
Start: 2024-04-02 | End: 2024-04-16 | Stop reason: HOSPADM

## 2024-04-01 RX ORDER — SODIUM CHLORIDE 9 MG/ML
INJECTION, SOLUTION INTRAVENOUS CONTINUOUS
Status: DISCONTINUED | OUTPATIENT
Start: 2024-04-01 | End: 2024-04-03

## 2024-04-01 RX ORDER — PREGABALIN 100 MG/1
200 CAPSULE ORAL AT BEDTIME
Status: DISCONTINUED | OUTPATIENT
Start: 2024-04-02 | End: 2024-04-04

## 2024-04-01 RX ORDER — CYCLOBENZAPRINE HCL 10 MG
10 TABLET ORAL 3 TIMES DAILY PRN
Status: DISCONTINUED | OUTPATIENT
Start: 2024-04-01 | End: 2024-04-16 | Stop reason: HOSPADM

## 2024-04-01 RX ADMIN — ACETAMINOPHEN 650 MG: 325 TABLET, FILM COATED ORAL at 05:22

## 2024-04-01 RX ADMIN — CETIRIZINE HYDROCHLORIDE 10 MG: 10 TABLET, FILM COATED ORAL at 19:58

## 2024-04-01 RX ADMIN — PREGABALIN 200 MG: 100 CAPSULE ORAL at 19:58

## 2024-04-01 RX ADMIN — MONTELUKAST 10 MG: 10 TABLET, FILM COATED ORAL at 19:58

## 2024-04-01 RX ADMIN — PANTOPRAZOLE SODIUM 40 MG: 40 TABLET, DELAYED RELEASE ORAL at 09:37

## 2024-04-01 RX ADMIN — HYDROMORPHONE HYDROCHLORIDE 2 MG: 2 TABLET ORAL at 07:41

## 2024-04-01 RX ADMIN — LIDOCAINE HYDROCHLORIDE 5 ML: 10 INJECTION, SOLUTION INFILTRATION; PERINEURAL at 10:46

## 2024-04-01 RX ADMIN — ACETAMINOPHEN 650 MG: 325 TABLET, FILM COATED ORAL at 09:37

## 2024-04-01 RX ADMIN — VALACYCLOVIR HYDROCHLORIDE 2000 MG: 1 TABLET, FILM COATED ORAL at 09:36

## 2024-04-01 RX ADMIN — LIDOCAINE HYDROCHLORIDE 3 ML: 10 INJECTION, SOLUTION INFILTRATION; PERINEURAL at 17:29

## 2024-04-01 RX ADMIN — HYDROMORPHONE HYDROCHLORIDE 2 MG: 2 TABLET ORAL at 02:43

## 2024-04-01 RX ADMIN — ACETAMINOPHEN 650 MG: 325 TABLET, FILM COATED ORAL at 20:00

## 2024-04-01 RX ADMIN — POLYETHYLENE GLYCOL 3350 17 G: 17 POWDER, FOR SOLUTION ORAL at 12:04

## 2024-04-01 RX ADMIN — PREGABALIN 100 MG: 100 CAPSULE ORAL at 09:36

## 2024-04-01 RX ADMIN — ACETAMINOPHEN 650 MG: 325 TABLET, FILM COATED ORAL at 13:47

## 2024-04-01 RX ADMIN — HYDROMORPHONE HYDROCHLORIDE 2 MG: 2 TABLET ORAL at 11:56

## 2024-04-01 RX ADMIN — HYDROMORPHONE HYDROCHLORIDE 2 MG: 2 TABLET ORAL at 17:19

## 2024-04-01 ASSESSMENT — ACTIVITIES OF DAILY LIVING (ADL)
ADLS_ACUITY_SCORE: 45
ADLS_ACUITY_SCORE: 40
ADLS_ACUITY_SCORE: 41
ADLS_ACUITY_SCORE: 41
ADLS_ACUITY_SCORE: 45
ADLS_ACUITY_SCORE: 40
ADLS_ACUITY_SCORE: 56
ADLS_ACUITY_SCORE: 45
ADLS_ACUITY_SCORE: 49
ADLS_ACUITY_SCORE: 41
ADLS_ACUITY_SCORE: 56
ADLS_ACUITY_SCORE: 45
ADLS_ACUITY_SCORE: 45
ADLS_ACUITY_SCORE: 40
ADLS_ACUITY_SCORE: 45
ADLS_ACUITY_SCORE: 41
ADLS_ACUITY_SCORE: 45
ADLS_ACUITY_SCORE: 56
ADLS_ACUITY_SCORE: 45
ADLS_ACUITY_SCORE: 44
ADLS_ACUITY_SCORE: 45

## 2024-04-01 NOTE — PROGRESS NOTES
Lumbar  puncture completed per Dr. Hall without difficulty for 11 ml clear CSF, patient was syncopal during exam but recovered without difficulty. VSS patient transferred to room via cart in stable condition.

## 2024-04-01 NOTE — DISCHARGE SUMMARY
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  Possible urinary retention  Abd pain, acute on chronic  Hx self injurious behavior w/foreign body ingestions in past  Under guardianship w/ED treatment plan  Lives in group home  Hx suspected medication seeking behavior  Frequent medical non compliance  Frequent utilization of healthcare system  Hx Morbid obesity BMI 45, MDD, CANDICE, bipolar disorder, borderline personality disorder, LE neuropathy, chronic pain       Unresulted Labs Ordered in the Past 30 Days of this Admission       Date and Time Order Name Status Description    3/31/2024  5:45 PM Cytology non gyn CSF In process     3/31/2024  5:45 PM Cerebrospinal fluid Aerobic Bacterial Culture Routine With Gram Stain Preliminary     3/31/2024  5:07 PM Ganglioside Antibodies IgG and IgM In process     3/29/2024 10:17 AM Surgical Pathology Exam In process         These results will be followed up by neurology team    Discharge Disposition   Transferred to [facility]  Condition at discharge: Stable    Hospital Course   33F with history of morbid obesity BMI 45 on Wegovy, CANDICE, MDD, Bipolar Disorder, and 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  Concerning MRI findings noting prominence of the cauda equina nerve roots but this was discussed with neurosurgery and less likely the cause of patient's symptoms has not changed from past MRIs.  Workup and treatment per below.       Acute Lower Extremity Paresis  HX CIDP w/possible flare  -Presents with 12hrs of severe weakness and pain in lower  extremities  -Hx of CIDP although this only caused patient neuropathy in the past w/MRI of lumbar spine with evidence of mildly enlarged cauda equina nerve roots   -my partner discussed with neurosurgery and MRI findings although abnormal appear similar to previous studies  -Unclear if this is related to CIDP, GBS, or conversion disorder  -cont home lyrica. Tylenol, oral dilauded. Avoid IV narcotics with previous concerns for medication seeking behaviors. Avoid toradol with GI complaints  -seen by psychiatry team, discussed with them and feel it's NOT a conversion disorder  -had LP 4/1 w/IR   -total protein 104   -CSF clear, glucose 60   -cytology pending  -discussed with neurology yesterday and today, they are still concerned it could be a CIDP flare. Recommend transfer for plasmaphoresis.  -has been accepted to [facility], discussed with hospitalist and will try to have IR place central line prior to transfer in anticipation of starting plasmaphoresis later tonight at [facility]    ? Urinary retention  -reporting urinary retention, PVR have been around 130 and 240 but unclear if these are true post voids as she is combative when questioned  -seems to be urinating adequately today    Abd pain  Hx self injurious behavior w/foreign body ingestion  -over 35 endoscopies in last 4 years for foreign body extractions based on my review  -appears to be more chronic or subacute issue, seen at [facility] on 3/29 and had normal EGD then left AMA  -discussed her wegovy could be contributing (20% develop abd pain per review), however patient is adament this is not the cause  -Abd XR normal, resume home protonix     Under guardianship w/ED treatment plan  Lives in group home  Hx suspected medication seeking behavior  Frequent medical non compliance  Frequent utilization of healthcare system  -complicates care    Hx Morbid obesity BMI 45, MDD, CANDICE, bipolar disorder, borderline personality disorder, LE neuropathy, chronic pain  -  based on med rec no longer on rexulti, sumatriptan or klonopin and not resumed  -on wegovy, adament this be restarted so will restart    Consultations This Hospital Stay   DIAGNOSTIC EVALUATION CENTER (DEC) ASSESSMENT ORDER  NEUROLOGY IP CONSULT  PHYSICAL THERAPY ADULT IP CONSULT  CARE MANAGEMENT / SOCIAL WORK IP CONSULT  PSYCHIATRY IP CONSULT  INTERVENTIONAL RADIOLOGY ADULT/PEDS IP CONSULT  INTERVENTIONAL RADIOLOGY ADULT/PEDS IP CONSULT  PSYCHIATRY IP CONSULT    Code Status   Full Code    Time Spent on this Encounter   I, Alfred Vallejo DO, personally saw the patient today and spent greater than 30 minutes discharging this patient.       Alfred Vallejo DO  [place] [address] [facility]  [address] [place] [address]  Phone: [phone]  Fax: [phone]  ______________________________________________________________________    Physical Exam   Vital Signs: Temp: 98.4  F (36.9  C) Temp src: Oral BP: 114/75 Pulse: 86   Resp: 18 SpO2: 97 % O2 Device: None (Room air)    Weight: 252 lbs 10.35 oz  Face to face completed day of discharge       Primary Care Physician   Latonya Knight    Discharge Orders   No discharge procedures on file.    Significant Results and Procedures   Most Recent 3 CBC's:  Recent Labs   Lab Test 03/31/24  0740 03/30/24  1504 03/29/24  1242   WBC 6.6 9.5 11.2*   HGB 13.4 13.9 14.4   MCV 93 94 90    220 294     Most Recent 3 BMP's:  Recent Labs   Lab Test 03/31/24  0740 03/30/24  1504 03/29/24  1242    141 142   POTASSIUM 4.2 3.8 4.2   CHLORIDE 107 107 107   CO2 25 22 26   BUN 15.5 11.5 8.7   CR 0.79 0.69 0.70   ANIONGAP 8 12 9   KELBY 9.1 9.4 9.6   * 101* 119*     Most Recent 2 LFT's:  Recent Labs   Lab Test 03/29/24  1242 03/13/24  2048   AST 17 19   ALT 26 34   ALKPHOS 82 93   BILITOTAL 0.3 0.2     Most Recent 3 INR's:  Recent Labs   Lab Test 01/15/23  1625 12/28/22  0742 10/15/22  2319   INR 1.11 1.06 1.03     Most Recent 3  Troponin's:  Recent Labs   Lab Test 03/13/21 2022 09/05/20  1521 02/15/20  2324   TROPI <0.015 <0.015 <0.015     Most Recent 3 BNP's:  Recent Labs   Lab Test 01/02/24  0427 06/27/23  2252 12/27/22  2142 11/14/22  2220   NTBNPI 50  --  57 12   NTBNP  --  39  --   --      Most Recent D-dimer:  Recent Labs   Lab Test 03/13/24 2048   DD 0.30   ,   Results for orders placed or performed during the hospital encounter of 03/30/24   XR Chest 2 Views    Narrative    EXAM: XR CHEST 2 VIEWS  LOCATION: [facility]  DATE: 3/30/2024    INDICATION: chest pain, s p EGD  COMPARISON: 03/13/2024      Impression    IMPRESSION: No acute new findings. Mild scoliosis.   Abdomen XR, 2 vw, flat and upright    Narrative    EXAM: XR ABDOMEN 2 VIEWS  LOCATION: [facility]  DATE: 3/30/2024    INDICATION: abd pain  COMPARISON: CT 03/22/2024      Impression    IMPRESSION: Scoliosis. Surgical clips in the gallbladder fossa. Abdomen otherwise negative. No free air. Nothing for obstruction.   MR Thoracic Spine w/o & w Contrast    Narrative    EXAM: MR THORACIC SPINE W/O and W CONTRAST  LOCATION: [facility]  DATE: 3/30/2024    INDICATION: back pain, unable to move legs  COMPARISON: None.  CONTRAST: 11ml shree  TECHNIQUE: Routine Thoracic Spine MRI without and with IV contrast.    FINDINGS:   Normal vertebral body heights.  No concerning bone marrow lesions.  No abnormal cord signal. No cord pathologic enhancement. No significant subluxations.    Upper thoracic spine mild to moderate dextro scoliosis. Lower thoracic spine mild to moderate levoscoliosis. Mild multilevel degenerative disc disease. Mild multilevel facet arthropathy. Several mild bulges.      No significant thecal sac stenosis.    T6-T7: Moderate left foraminal stenosis.  T7-T8: Mild-to-moderate left foraminal stenosis.  T8-T9: Moderate right foraminal stenosis.  Remaining levels demonstrate no greater than mild neural  foraminal stenosis    OTHER:  No significant extraspinal findings.      Impression     IMPRESSION:  1.  No abnormal cord signal. No cord pathologic enhancement.    2.  Mild multilevel spondylosis.    3.  No significant thecal sac stenosis.   MR Lumbar Spine w/o & w Contrast    Narrative    EXAM: MR LUMBAR SPINE W/O and W CONTRAST  LOCATION: [facility]  DATE: 3/30/2024    INDICATION: back pain, unable to move legs  COMPARISON: None.  CONTRAST: 11 mL Gadavist  TECHNIQUE: Routine Lumbar Spine MRI without and with IV contrast.    FINDINGS:   Transitional lumbosacral anatomy. Last well-formed disc space labeled L5-S1 with right L5 partial sacralization. Right L5 partial sacralization demonstrates a pseudoarthrosis with adjacent fatty marrow changes.  Normal vertebral body heights.  No   concerning bone marrow lesions.  Mid to lower lumbar spinal canal partially fatty filum. Unremarkable visualized bony pelvis.  No significant subluxations.    Lumbar mild to moderate dextro scoliosis. Essentially normal disc space height and signal. No significant degenerative disc disease. No significant facet arthropathy. No significant bulge or posterior disc protrusion. No significant thecal sac stenosis.   No significant neural foraminal stenosis.    Cauda equina nerve roots appear mildly enlarged as well as the lower lumbar spine and sacral exiting nerve roots. Some of these nerve roots demonstrate thin nonnodular enhancement. Findings may reflect chronic inflammatory demyelinating polyneuropathy,   Charcot-Monique-Tooth, neurofibromatosis type I. Enhancement could suggest acute infectious inflammatory process superimposed on chronic findings.    No abnormal conus signal. No conus pathologic enhancement. Conus terminates at L2.    EXTRASPINAL FINDINGS:  No significant findings.      Impression     IMPRESSION:  1.  Cauda equina nerve roots appear mildly enlarged as well as the lower lumbar spine and sacral  exiting nerve roots. Some of these nerve roots demonstrate thin nonnodular enhancement. Findings may reflect chronic inflammatory demyelinating   polyneuropathy, Charcot-Monique-Tooth, neurofibromatosis type I. Enhancement could suggest acute infectious inflammatory process superimposed on chronic findings.    2.  Transitional lumbosacral anatomy described above.    3.  No significant degenerative changes.    4.  No significant thecal sac stenosis. No significant neural foraminal stenosis.     *Note: Due to a large number of results and/or encounters for the requested time period, some results have not been displayed. A complete set of results can be found in Results Review.       Discharge Medications   Current Discharge Medication List        CONTINUE these medications which have NOT CHANGED    Details   acetaminophen (TYLENOL) 500 MG tablet Take 2 tablets (1,000 mg) by mouth every 6 hours as needed for pain or fever  Qty: 60 tablet, Refills: 0      albuterol (PROAIR HFA/PROVENTIL HFA/VENTOLIN HFA) 108 (90 Base) MCG/ACT inhaler Inhale 2 puffs into the lungs every 6 hours as needed for shortness of breath / dyspnea or wheezing  Qty: 18 g, Refills: 0    Comments: Pharmacy may dispense brand covered by insurance (Proair, or proventil or ventolin or generic albuterol inhaler)      albuterol (PROVENTIL) (2.5 MG/3ML) 0.083% neb solution Take 1 vial (2.5 mg) by nebulization every 6 hours as needed for shortness of breath or wheezing  Qty: 90 mL, Refills: 0      benzonatate (TESSALON) 100 MG capsule Take 1 capsule (100 mg) by mouth 3 times daily as needed for cough  Qty: 12 capsule, Refills: 0      cetirizine (ZYRTEC) 10 MG tablet Take 1 tablet (10 mg) by mouth daily  Qty: 30 tablet, Refills: 0    Associated Diagnoses: Pica in adults; Gastroesophageal reflux disease without esophagitis      Cholecalciferol (D3 HIGH POTENCY) 25 MCG (1000 UT) CAPS Take 50 mcg by mouth daily      clonazePAM (KLONOPIN) 0.5 MG tablet Take 0.5mg  daily PRN anxiety- 20 tablets per month, try to keep it to 3-4x per week      cyclobenzaprine (FLEXERIL) 10 MG tablet Take 1 tablet (10 mg) by mouth 3 times daily as needed for muscle spasms  Qty: 20 tablet, Refills: 0      ferrous sulfate (FEROSUL) 325 (65 Fe) MG tablet Take 1 tablet (325 mg) by mouth daily (with breakfast)  Qty: 30 tablet, Refills: 0    Associated Diagnoses: Red blood cell antibody positive      montelukast (SINGULAIR) 10 MG tablet Take 10 mg by mouth every evening      norethindrone (AYGESTIN) 5 MG tablet Take 5 mg by mouth daily      ondansetron (ZOFRAN-ODT) 4 MG ODT tab Take 1 tablet (4 mg) by mouth every 8 hours as needed for nausea  Qty: 15 tablet, Refills: 0    Associated Diagnoses: Gastroesophageal reflux disease without esophagitis      pantoprazole (PROTONIX) 40 MG EC tablet Take 40 mg by mouth daily      polyethylene glycol (MIRALAX) 17 GM/Dose powder Take 17 g by mouth daily as needed for constipation      !! pregabalin (LYRICA) 100 MG capsule Take 100 mg by mouth every morning      !! pregabalin (LYRICA) 100 MG capsule Take 200 mg by mouth at bedtime      PSYLLIUM PO Take 1 tsp by mouth daily      Semaglutide-Weight Management (WEGOVY) 1 MG/0.5ML pen Inject 1 mg Subcutaneous once a week  Qty: 2 mL, Refills: 3    Associated Diagnoses: Class 3 severe obesity with serious comorbidity and body mass index (BMI) of 50.0 to 59.9 in adult, unspecified obesity type (H)      valACYclovir (VALTREX) 1000 mg tablet Take 2,000 mg by mouth 2 times daily as needed Take 2 tablets by mouth two times daily for one day. Use as needed at onset of cold sore.      Respiratory Therapy Supplies (NEBULIZER) BRENDAN Nebulizer device.  Albuterol nebulization every 2 hours as needed for shortness of breath or wheezing.  Qty: 1 each, Refills: 0    Comments: Include tubing and mask for age please.       !! - Potential duplicate medications found. Please discuss with provider.        Allergies   Allergies   Allergen  Reactions    Amoxicillin-Pot Clavulanate Other (See Comments), Swelling and Rash     PN: facial swelling, left side. Also had cortisone injection the same day as she started the Augmentin.  Noted in downtime recovery of chart.    PN: facial swelling, left side. Also had cortisone injection the same day as she started the Augmentin.; HUT Comment: PN: facial swelling, left side. Also had cortisone injection the same day as she started the Augmentin.Noted in downtime recovery of chart.; HUT Reaction: Rash; HUT Reaction: Unknown; HUT Reaction Type: Allergy; HUT Severity: Med; HUT Noted: [id number]  PN: facial swelling, left side. Also had cortisone injection the same day as she started the Augmentin.  Other reaction(s): *Unknown  PN: facial swelling, left side. Also had cortisone injection the same day as she started the Augmentin.  Noted in downtime recovery of chart.  PN: facial swelling, left side. Also had cortisone injection the same day as she started the Augmentin.  Other reaction(s): Facial swelling  Other reaction(s): Facial swelling    Hydrocodone Nausea and Vomiting and GI Disturbance     vomiting for days, PN: vomiting for days; HUT Comment: vomiting for days; HUT Reaction: Gastrointestinal; HUT Reaction: Nausea And Vomiting; HUT Reaction Type: Intolerance; HUT Severity: Med; HUT Noted: [id number]  vomiting for days    Other reaction(s): Rash    Hydrocodone-Acetaminophen Nausea and Vomiting and Rash     Update on 12/12  Pt says she can take tylenol just not the hydrocodone.   Other reaction(s): Rash      Influenza Vaccines Other (See Comments) and Nausea and Vomiting     HUT Reaction: Nausea And Vomiting; HUT Reaction Type: Intolerance; HUT Severity: Low; HUT Noted: [id number]    Latex Rash     HUT Reaction: Rash; HUT Reaction Type: Allergy; HUT Severity: Low; HUT Noted: [id number]  Other reaction(s): Rash      Oseltamivir Hives     med stopped, PN: med stopped  med stopped, PN: med stopped; HUT Comment: med  stopped, PN: med stopped; HUT Reaction: Hives; HUT Reaction Type: Allergy; HUT Severity: Med; HUT Noted: [id number]    Penicillins Anaphylaxis     HUT Reaction: Anaphylaxis; HUT Reaction Type: Allergy; HUT Severity: High; HUT Noted: [id number]    Vancomycin Itching, Swelling and Rash     Other reaction(s): Redness  Flushed face, very itchy; HUT Comment: Flushed face, very itchy; HUT Reaction: Angioedema; HUT Reaction: Redness; HUT Severity: Med; HUT Noted: [id number]    facial    Blood-Group Specific Substance Other (See Comments)     Patient has an anti-Cw and non-specific antibodies. Blood product orders may be delayed. Draw one red top and two purple top tubes for all type/screen/crossmatch orders.  Patient has anti-Cw, anti-K (Kennett), Warm auto and nonspecific antibodies. Blood products may be delayed. Draw patient 24 hours prior to transfusion. Draw one red top and two purple top tubes for all type and screen orders.    Clavulanic Acid Angioedema    Eggshell Membrane (Chicken) [Egg Shells]     Fentanyl Itching    Gluten Meal     Haemophilus B Polysaccharide Vaccine Nausea and Vomiting    Lactose     Naltrexone Other (See Comments)     Reaction(s): Vivid dreams.    Other Drug Allergy (See Comments)      See original file MRN [id number]. Files are marked for merge    Oxycodone Swelling    Adhesive Tape Rash     Silicone type  Silicone type    Other reaction(s): adhesive allergy  Other reaction(s): adhesive allergy  Silicone type    Other reaction(s): adhesive allergy      Band-Aid Anti-Itch      Other reaction(s): adhesive allergy    Cephalosporins Rash    Lamotrigine Rash     Possibly associated with Lamictal.   HUT Comment: Possibly associated with Lamictal. ; HUT Reaction: Rash; HUT Reaction Type: Allergy; HUT Severity: Low; HUT Noted: [id number]    No Clinical Screening - See Comments Rash and Other (See Comments)     Silicone type  Silicone type  See original file MRN [id number]. Files are marked for merge  History  of swallowing sharp metallic objects. She should not be prescribed lancets due to posed risk of swallowing.

## 2024-04-01 NOTE — PROGRESS NOTES
CPAP orders placed. Patient states she does have a CPAP machine at home and she does use it but did not bring it and does not plan to bring it as she plans to transfer to a different hospital and would like to wait to bring her home machine to her final stay. Pt was offered hospital machine but states she does not like the hospital masks and will wear oxygen in place. Pt to notify RN of any changes. RT to follow as needed.

## 2024-04-01 NOTE — UTILIZATION REVIEW
Admission Status; Secondary Review Determination       Under the authority of the Utilization Management Committee, the utilization review process indicated a secondary review on the above patient. The review outcome is based on review of the medical records, discussions with staff, and applying clinical experience noted on the date of the review.     (x) Inpatient Status Appropriate - This patient's medical care is consistent with medical management for inpatient care and reasonable inpatient medical practice.     RATIONALE FOR DETERMINATION:  33 year old With a past medical history of obesity, anxiety, depression, bipolar disorder, self injury behavior, and CIDP comes in with bilateral lower extremity weakness.   Neurology has seen her and is recommending plasma exchange/steroids.   She is undergoing LP today.   Patient has ongoing weakness complaints and cannot discharge back to prior living situation.    At the time of admission with the information available to the attending physician more than 2 nights Hospital complex care was anticipated, based on patient risk of adverse outcome if treated as outpatient and complex care required. Inpatient admission is appropriate based on the Medicare guidelines.   The information on this document is developed by the utilization review team in order for the business office to ensure compliance. This only denotes the appropriateness of proper admission status and does not reflect the quality of care rendered.   The definitions of Inpatient Status and Observation Status used in making the determination above are those provided in the CMS Coverage Manual, Chapter 1 and Chapter 6, section 70.4.     Sincerely,     Marcos Mills DO, [facility]  Utilization Review  Physician Advisor

## 2024-04-01 NOTE — CONSULTS
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     Diabetes Type 2  Paternal Grandmother     Breast Cancer Paternal Grandmother     Cerebrovascular Disease Father 53    Myocardial Infarction No family hx of     Coronary Artery Disease Early Onset No family hx of     Ovarian Cancer No family hx of     Colon  Cancer No family hx of     Depression Mother     Anxiety Disorder Mother            Social History:   SOCIAL HISTORY:   Social History     Tobacco Use    Smoking status: Never    Smokeless tobacco: Never   Substance Use Topics    Alcohol use: No     Alcohol/week: 0.0 standard drinks of alcohol       Resides in group home, has current guardian. Reports being active in [employer].          Physical ROS:   The 10 point Review of Systems is negative other than noted in the HPI or here.           Medications:      cetirizine  10 mg Oral QPM    montelukast  10 mg Oral QPM    pantoprazole  40 mg Oral Daily    polyethylene glycol  17 g Oral Daily    pregabalin  100 mg Oral QAM    pregabalin  200 mg Oral At Bedtime    Semaglutide-Weight Management  1 mg Subcutaneous Weekly              Allergies:     Allergies   Allergen Reactions    Amoxicillin-Pot Clavulanate Other (See Comments), Swelling and Rash     PN: facial swelling, left side. Also had cortisone injection the same day as she started the Augmentin.  Noted in downtime recovery of chart.    PN: facial swelling, left side. Also had cortisone injection the same day as she started the Augmentin.; HUT Comment: PN: facial swelling, left side. Also had cortisone injection the same day as she started the Augmentin.Noted in downtime recovery of chart.; HUT Reaction: Rash; HUT Reaction: Unknown; HUT Reaction Type: Allergy; HUT Severity: Med; HUT Noted: [id number]  PN: facial swelling, left side. Also had cortisone injection the same day as she started the Augmentin.  Other reaction(s): *Unknown  PN: facial swelling, left side. Also had cortisone injection the same day as she started the Augmentin.  Noted in downtime recovery of chart.  PN: facial swelling, left side. Also had cortisone injection the same day as she started the Augmentin.  Other reaction(s): Facial swelling  Other reaction(s): Facial swelling    Hydrocodone Nausea and Vomiting and GI Disturbance     vomiting for  days, PN: vomiting for days; HUT Comment: vomiting for days; HUT Reaction: Gastrointestinal; HUT Reaction: Nausea And Vomiting; HUT Reaction Type: Intolerance; HUT Severity: Med; HUT Noted: [id number]  vomiting for days    Other reaction(s): Rash    Hydrocodone-Acetaminophen Nausea and Vomiting and Rash     Update on 12/12  Pt says she can take tylenol just not the hydrocodone.   Other reaction(s): Rash      Influenza Vaccines Other (See Comments) and Nausea and Vomiting     HUT Reaction: Nausea And Vomiting; HUT Reaction Type: Intolerance; HUT Severity: Low; HUT Noted: [id number]    Latex Rash     HUT Reaction: Rash; HUT Reaction Type: Allergy; HUT Severity: Low; HUT Noted: [id number]  Other reaction(s): Rash      Oseltamivir Hives     med stopped, PN: med stopped  med stopped, PN: med stopped; HUT Comment: med stopped, PN: med stopped; HUT Reaction: Hives; HUT Reaction Type: Allergy; HUT Severity: Med; HUT Noted: [id number]    Penicillins Anaphylaxis     HUT Reaction: Anaphylaxis; HUT Reaction Type: Allergy; HUT Severity: High; HUT Noted: [id number]    Vancomycin Itching, Swelling and Rash     Other reaction(s): Redness  Flushed face, very itchy; HUT Comment: Flushed face, very itchy; HUT Reaction: Angioedema; HUT Reaction: Redness; HUT Severity: Med; HUT Noted: [id number]    facial    Blood-Group Specific Substance Other (See Comments)     Patient has an anti-Cw and non-specific antibodies. Blood product orders may be delayed. Draw one red top and two purple top tubes for all type/screen/crossmatch orders.  Patient has anti-Cw, anti-K (Palmer), Warm auto and nonspecific antibodies. Blood products may be delayed. Draw patient 24 hours prior to transfusion. Draw one red top and two purple top tubes for all type and screen orders.    Clavulanic Acid Angioedema    Eggshell Membrane (Chicken) [Egg Shells]     Fentanyl Itching    Gluten Meal     Haemophilus B Polysaccharide Vaccine Nausea and Vomiting    Lactose     Naltrexone Other  (See Comments)     Reaction(s): Vivid dreams.    Other Drug Allergy (See Comments)      See original file MRN [id number]. Files are marked for merge    Oxycodone Swelling    Adhesive Tape Rash     Silicone type  Silicone type    Other reaction(s): adhesive allergy  Other reaction(s): adhesive allergy  Silicone type    Other reaction(s): adhesive allergy      Band-Aid Anti-Itch      Other reaction(s): adhesive allergy    Cephalosporins Rash    Lamotrigine Rash     Possibly associated with Lamictal.   HUT Comment: Possibly associated with Lamictal. ; HUT Reaction: Rash; HUT Reaction Type: Allergy; HUT Severity: Low; HUT Noted: [id number]    No Clinical Screening - See Comments Rash and Other (See Comments)     Silicone type  Silicone type  See original file MRN [id number]. Files are marked for merge  History of swallowing sharp metallic objects. She should not be prescribed lancets due to posed risk of swallowing.           Labs:     Recent Results (from the past 48 hour(s))   Basic metabolic panel    Collection Time: 03/30/24  3:04 PM   Result Value Ref Range    Sodium 141 135 - 145 mmol/L    Potassium 3.8 3.4 - 5.3 mmol/L    Chloride 107 98 - 107 mmol/L    Carbon Dioxide (CO2) 22 22 - 29 mmol/L    Anion Gap 12 7 - 15 mmol/L    Urea Nitrogen 11.5 6.0 - 20.0 mg/dL    Creatinine 0.69 0.51 - 0.95 mg/dL    GFR Estimate >90 >60 mL/min/1.73m2    Calcium 9.4 8.6 - 10.0 mg/dL    Glucose 101 (H) 70 - 99 mg/dL   CBC with platelets and differential    Collection Time: 03/30/24  3:04 PM   Result Value Ref Range    WBC Count 9.5 4.0 - 11.0 10e3/uL    RBC Count 4.50 3.80 - 5.20 10e6/uL    Hemoglobin 13.9 11.7 - 15.7 g/dL    Hematocrit 42.5 35.0 - 47.0 %    MCV 94 78 - 100 fL    MCH 30.9 26.5 - 33.0 pg    MCHC 32.7 31.5 - 36.5 g/dL    RDW 13.1 10.0 - 15.0 %    Platelet Count 220 150 - 450 10e3/uL    % Neutrophils 65 %    % Lymphocytes 28 %    % Monocytes 7 %    % Eosinophils 0 %    % Basophils 0 %    % Immature Granulocytes 0 %     NRBCs per 100 WBC 0 <1 /100    Absolute Neutrophils 6.1 1.6 - 8.3 10e3/uL    Absolute Lymphocytes 2.6 0.8 - 5.3 10e3/uL    Absolute Monocytes 0.6 0.0 - 1.3 10e3/uL    Absolute Eosinophils 0.0 0.0 - 0.7 10e3/uL    Absolute Basophils 0.0 0.0 - 0.2 10e3/uL    Absolute Immature Granulocytes 0.0 <=0.4 10e3/uL    Absolute NRBCs 0.0 10e3/uL   CK total    Collection Time: 03/30/24  3:04 PM   Result Value Ref Range     26 - 192 U/L   RBC and Platelet Morphology    Collection Time: 03/30/24  3:04 PM   Result Value Ref Range    Platelet Assessment  Automated Count Confirmed. Platelet morphology is normal.     Automated Count Confirmed. Platelet morphology is normal.    Acanthocytes      Fito Rods      Basophilic Stippling      Bite Cells      Blister Cells      Refugio Cells      Elliptocytes      Hgb C Crystals      Alicea-Jolly Bodies      Hypersegmented Neutrophils      Polychromasia      RBC agglutination      RBC Fragments      Reactive Lymphocytes      Rouleaux      Sickle Cells      Smudge Cells      Spherocytes      Stomatocytes      Target Cells      Teardrop Cells      Toxic Neutrophils      RBC Morphology Confirmed RBC Indices    Basic metabolic panel    Collection Time: 03/31/24  7:40 AM   Result Value Ref Range    Sodium 140 135 - 145 mmol/L    Potassium 4.2 3.4 - 5.3 mmol/L    Chloride 107 98 - 107 mmol/L    Carbon Dioxide (CO2) 25 22 - 29 mmol/L    Anion Gap 8 7 - 15 mmol/L    Urea Nitrogen 15.5 6.0 - 20.0 mg/dL    Creatinine 0.79 0.51 - 0.95 mg/dL    GFR Estimate >90 >60 mL/min/1.73m2    Calcium 9.1 8.6 - 10.0 mg/dL    Glucose 102 (H) 70 - 99 mg/dL   CBC with platelets    Collection Time: 03/31/24  7:40 AM   Result Value Ref Range    WBC Count 6.6 4.0 - 11.0 10e3/uL    RBC Count 4.39 3.80 - 5.20 10e6/uL    Hemoglobin 13.4 11.7 - 15.7 g/dL    Hematocrit 40.7 35.0 - 47.0 %    MCV 93 78 - 100 fL    MCH 30.5 26.5 - 33.0 pg    MCHC 32.9 31.5 - 36.5 g/dL    RDW 13.3 10.0 - 15.0 %    Platelet Count 298 150 - 450  "10e3/uL   Glucose CSF:    Collection Time: 04/01/24 10:43 AM   Result Value Ref Range    Glucose CSF 60 40 - 70 mg/dL   Protein total CSF:    Collection Time: 04/01/24 10:43 AM   Result Value Ref Range    Protein total .9 (H) 15.0 - 45.0 mg/dL   Cell Count CSF    Collection Time: 04/01/24 10:43 AM   Result Value Ref Range    Tube Number 4     Color Colorless Colorless    Clarity Clear Clear    Total Nucleated Cells 1 0 - 5 /uL    RBC Count 0 0 - 2 /uL   Cerebrospinal fluid Aerobic Bacterial Culture Routine With Gram Stain    Collection Time: 04/01/24 10:44 AM    Specimen: Lumbar Puncture; Cerebrospinal fluid   Result Value Ref Range    Gram Stain Result No organisms seen           Physical and Psychiatric Examination:     /75   Pulse 86   Temp 98.4  F (36.9  C) (Oral)   Resp 18   Ht 1.575 m (5' 2\")   Wt 114.6 kg (252 lb 10.4 oz)   LMP 07/12/2023   SpO2 97%   BMI 46.21 kg/m    Weight is 252 lbs 10.35 oz  Body mass index is 46.21 kg/m .    Physical Exam:  I have reviewed the physical exam as documented by by the medical team and agree with findings and assessment and have no additional findings to add at this time.    Mental Status Exam:    Appearance: awake, alert, adequately groomed, appeared as age stated, and laying in bed   Attitude:  cooperative  Eye Contact:  fair  Mood:   \"good\" and \"frustrated and irritated\"  Affect:  mood congruent  Speech:  clear, coherent and normal prosody  Psychomotor Behavior:  no evidence of tardive dyskinesia, dystonia, or tics  Thought Process:  logical and linear  Associations:  no loose associations  Thought Content:  no evidence of suicidal ideation or homicidal ideation and no evidence of psychotic thought  Insight:  good  Judgement:  intact  Oriented to:  time, person, and place  Attention Span and Concentration:  intact  Recent and Remote Memory:  intact             DSM-5 Diagnosis:   300.02 (F41.1) Generalized Anxiety Disorder  Borderline Personality " Disorder 60.3   R/O conversion disorder   PTSD by hx  Depression by Hx           Assessment:     Nevin Alvarado is a 33 year old who was admitted on 3/30/24 after she developed leg weakness, pain, and difficulty getting out of the bed.  She has a significant past medical and mental health history notable for generalized anxiety, depression, PTSD, borderline personality disorder, self-injurious behavior including foreign body ingestions, HTN, diabetes, PE, and CIDP. 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    Psychiatry asked to consult to assess for possible conversion disorder in addition to depression and anxiety. Patient denies any recent traumatic events, stressors, or significant changes that can contribute to the presentation of conversion disorder however it can present in the absence of these as well. Patient denies current mental health symptoms noting that she feels like she has been the most stable she has been in several years. Given the rapid onset of lower extremity weakness, pain, and paralysis it is difficult to discern consistency of clinical findings. In conversion disorder, it is common for symptoms to be inconsistent especially if the patient is preoccupied or distracted. Patient reports that pain an paralysis has been consistent since Saturday morning. Presentation could be due to CIDP exacerbation as noted by neurology and additional neurology and medical work-up would be beneficial to further explore this. In discussion and consultation with attending medical provider, treatment for current presentation would indicate PT. Should patient feel as though anxiety and or depressive symptoms become prominent then considering resumption of selective serotonin reuptake inhibitor of increase in  frequency of psychotherapy would be indicated, this is also the same treatment that would be utilized for patients presenting with conversion disorder. Patient has established outpatient providers that they wish to continue working with. This is a challenging case given the various complexities in patient's presentation.             Summary of Recommendations:   No medication changes recommended at time of assessment  Patient would benefit from PT consult  Patient has established outpatient psych med management and therapy that they anticipate returning to following discharge   Defer to neurology and medical for additional work-up  Please reconsult psychiatry as needed       HU Rodas CNP    Consult/Liaison Psychiatry   [facility]    Contact information available via [facility] Paging/Directory  If I am not available, then [facility] CL line ([phone]) should know who is covering our consult service.

## 2024-04-01 NOTE — PROCEDURES
Neurointerventional Surgery    Procedure: Lumbar puncture    Radiologist: Ivan Hall MD    Contrast: None  Fluoro Time: 0.5 minutes  Number of images: 1     EBL: Minimal  Complications: None  Specimen: 12 ml of clear CSF    Preliminary findings: (see dictation for full detail)  Lumbar puncture at L4-5.    Assess/Plan:   Routine post procedure monitoring  Bedrest for 1 hour      Ivan Hall MD  Pager: [phone]  Emergency pager: [phone]  Office: [phone]

## 2024-04-01 NOTE — PROGRESS NOTES
"[facility]  Neurology Daily Note      Admission Date:3/30/2024   Date of service: 04/01/2024   Hospital Day: 3                                                   Assessment and Plan:   #. Weakness, CIDP   Hx of CIDP, worsening weakness with  MRI lumbar spine completed showing enlargement of cauda equina nerve roots and enhancement.  This is suggestive of CIDP and recommend treatment with IVIG but she did not have benefit in the past so lets plan for plasma exchange. LP showed albumin cytologic dissociation confirming this diagnosis.   -Transfer for PLEX, recommend 5-7 sessions depending on recovery   -LP glucose 60, protein 104.9, WBC 1  - 1 g Solu-Medrol per day until she starts plasma exchange, recommend tonight at [facility] if she doesn't get started on PLEX.   -GQ1B antibody pending   -Anxiety and depression: Recommend psychiatry consult.  Was seen yesterday by psychotherapy.     #. PT/OT/Speech  --continue evaluations  #.Nutrition     --Per speech therapy evaluation       Interval History:   Per Dr. Santos's note      \"She has a significant past medical history of morbid obesity, generalized anxiety, depression, bipolar disorder and CIDP.  She was seen by many neurologists in the past.  [facility].  She also saw [place] physician Dr. Becca Martinez and was seen by Dr. Barney De La Rosa just recently earlier in the month.  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 She also has access issues and is concerned about IVIG. \"     Patient underwent LP tody          Medications:   Scheduled Meds:   cetirizine  10 mg Oral QPM    montelukast  10 mg Oral QPM    pantoprazole  40 mg Oral Daily    polyethylene glycol  17 g Oral Daily    pregabalin  100 mg Oral QAM    pregabalin  200 mg Oral At Bedtime    Semaglutide-Weight Management  1 mg Subcutaneous Weekly     PRN " Meds: acetaminophen **OR** acetaminophen, albuterol, calcium carbonate, HYDROmorphone, naloxone **OR** naloxone **OR** naloxone **OR** naloxone, senna-docusate **OR** senna-docusate        Physical Exam:   Vitals: Temp: 98.4  F (36.9  C) Temp src: Oral BP: 114/75 Pulse: 86   Resp: 18 SpO2: 97 % O2 Device: None (Room air)    Vital Signs with Ranges: Temp:  [97.2  F (36.2  C)-98.8  F (37.1  C)] 98.4  F (36.9  C)  Pulse:  [74-86] 86  Resp:  [16-20] 18  BP: (109-136)/(52-92) 114/75  SpO2:  [95 %-97 %] 97 %    General Appearance:  No acute distress  Neuro:           Mental Status: The patient is alert and oriented. Recent memory is normal. The person is attentive with normal concentration. Language is fluent. Speech is of normal vera and character. The speech is nondysarthric. Fund of knowledge is normal.  Cranial Nerves: Visual fields full to confrontation, no visual extinction.  Pupils equal round and reactive to light, no APD. Pursuits without saccadic intrusions and full in all directions. No gaze deviation, dysconjugate gaze, or gaze palsy, no nystagmus, no ptosis. Facial sensation intact in all distributions of CN V. No facial asymmetry at rest or with activation on smile or eyebrow lift. Symmetric palate elevation, tongue midline with full lateral movements. No dysarthria observed.   Motor:  Muscle Strength: The strength was 5/5 in upper and 1/5 lower extremities bilaterally.  Reflexes: The reflexes are 2/4 for biceps, brachioradialis, 0/4 patellar and achilles tendon reflexes bilaterally.   Sensory: The sensory examination is normal for light touch  bilaterally and symmetrically.   Cerebellar: The cerebellar examination is normal to finger to nose test.  Extremities: No clubbing, no cyanosis, no edema       Data:   ROUTINE IP LABS (Last 3results)  CBC RESULTS:     Recent Labs   Lab Test 03/31/24  0740 03/30/24  1504 03/29/24  1242   WBC 6.6 9.5 11.2*   RBC 4.39 4.50 4.77   HGB 13.4 13.9 14.4   HCT 40.7 42.5  42.8    220 294     Basic Metabolic Panel:  Recent Labs   Lab Test 03/31/24  0740 03/30/24  1504 03/29/24  1242    141 142   POTASSIUM 4.2 3.8 4.2   CHLORIDE 107 107 107   CO2 25 22 26   BUN 15.5 11.5 8.7   CR 0.79 0.69 0.70   * 101* 119*   KELBY 9.1 9.4 9.6     Liver panel:  Recent Labs   Lab Test 03/29/24  1242 03/13/24  2048 12/13/23  1522 11/21/23  2338 11/05/23  2042   PROTTOTAL 7.2 7.5 7.2 6.8 6.3*   ALBUMIN 4.4 4.5 4.4 4.1 4.0   BILITOTAL 0.3 0.2 0.4 0.4 0.2   ALKPHOS 82 93 86 78 67   AST 17 19 25 17 14   ALT 26 34 27 13 23     Thyroid Panel:  Recent Labs   Lab Test 06/27/23  2252 02/11/22  0844 11/30/20  0823 10/07/20  0041 09/08/20  2215   TSH 4.47* 4.94* 3.19   < > 6.96*   T4 1.17 0.87  --   --  0.94    < > = values in this interval not displayed.      Vitamin B12:   Recent Labs   Lab Test 02/11/22  0844 03/21/18  0802   B12 203 279      Ammonia: No lab results found.         IMAGING:   Independent interpretation of the following studies by myself as part of today's encounter.    IMPRESSION:  1.  Cauda equina nerve roots appear mildly enlarged as well as the lower lumbar spine and sacral exiting nerve roots. Some of these nerve roots demonstrate thin nonnodular enhancement. Findings may reflect chronic inflammatory demyelinating   polyneuropathy, Charcot-Monique-Tooth, neurofibromatosis type I. Enhancement could suggest acute infectious inflammatory process superimposed on chronic findings.  2.  Transitional lumbosacral anatomy described above.  3.  No significant degenerative changes.  4.  No significant thecal sac stenosis. No significant neural foraminal stenosi    MRI T spine w/wo    IMPRESSION:  1.  No abnormal cord signal. No cord pathologic enhancement.  2.  Mild multilevel spondylosis.  3.  No significant thecal sac stenosis.    TIME     55 minutes Evaluation/management time     Nevin Gamble M.D.  Neurologist  [facility] Neurology  Office [phone]

## 2024-04-01 NOTE — PLAN OF CARE
"Goal Outcome Evaluation:    Pertinent assessments: Pt arrived on the floor right before shift change, ~1845. She is A&O x4. Up with Ax2 and lift. C/o pain in her lower back and bilateral legs; PRN oral dilaudid 2mg x3; PRN tylenol 650mg x2. Initially not very effective; trying to get her on a good schedule of alternating between dilaudid and tylenol q2h. She also used a cool washcloth on her forehead to soothe pain at her temples. Pt used Purewick when she felt the urge to urinate; first urination overnight was 350ml. Tried sitting on BSC to encourage BM without success; gave first dose of Miralax. NPO status initiated at midnight in prep for lumbar puncture under sedation. Water given only to take meds. Sitter at bedside for safety.    Major Shift Events: Arrived on the unit ~1845    Treatment Plan: Pain management, lumbar puncture with IR, psychiatry consult, PT consult, SW/Care Coordination, awaiting transfer to [facility] for plasma exchange    Bedside Nurse: Rhina Gonzalez RN      Plan of Care Reviewed With: patient    Overall Patient Progress: no changeOverall Patient Progress: no change    Outcome Evaluation: Trying to get on a good schedule with pain meds for optimal relief.      Problem: Adult Inpatient Plan of Care  Goal: Plan of Care Review  Description: The Plan of Care Review/Shift note should be completed every shift.  The Outcome Evaluation is a brief statement about your assessment that the patient is improving, declining, or no change.  This information will be displayed automatically on your shift  note.  Outcome: Not Progressing  Flowsheets (Taken 4/1/2024 0755)  Outcome Evaluation: Trying to get on a good schedule with pain meds for optimal relief.  Plan of Care Reviewed With: patient  Overall Patient Progress: no change  Goal: Patient-Specific Goal (Individualized)  Description: You can add care plan individualizations to a care plan. Examples of Individualization might be:  \"Parent requests to " "be called daily at 9am for status\", \"I have a hard time hearing out of my right ear\", or \"Do not touch me to wake me up as it startles  me\".  Outcome: Not Progressing  Goal: Absence of Hospital-Acquired Illness or Injury  Outcome: Not Progressing  Intervention: Identify and Manage Fall Risk  Recent Flowsheet Documentation  Taken 3/31/2024 2107 by Rhina Gonzalez RN  Safety Promotion/Fall Prevention:   bedside attendant   assistive device/personal items within reach   safety round/check completed  Intervention: Prevent Skin Injury  Recent Flowsheet Documentation  Taken 3/31/2024 2107 by Rhina Gonzalez RN  Body Position: supine, head elevated  Intervention: Prevent and Manage VTE (Venous Thromboembolism) Risk  Recent Flowsheet Documentation  Taken 3/31/2024 2107 by Rhina Gonzalez RN  VTE Prevention/Management: SCDs (sequential compression devices) on  Goal: Optimal Comfort and Wellbeing  Outcome: Not Progressing  Intervention: Monitor Pain and Promote Comfort  Recent Flowsheet Documentation  Taken 4/1/2024 0741 by Rhina Gonzalez RN  Pain Management Interventions: medication (see MAR)  Taken 4/1/2024 0522 by Rhina Gonzalez RN  Pain Management Interventions: medication (see MAR)  Taken 4/1/2024 0243 by Rhina Gonzalez RN  Pain Management Interventions:   medication (see MAR)   cold applied  Taken 3/31/2024 2107 by Rhina Gonzalez RN  Pain Management Interventions: medication (see MAR)  Goal: Readiness for Transition of Care  Outcome: Not Progressing  Intervention: Mutually Develop Transition Plan  Recent Flowsheet Documentation  Taken 4/1/2024 0300 by Rhina Gonzalez RN  Equipment Currently Used at Home: walker, rolling     Problem: Pain Acute  Goal: Optimal Pain Control and Function  Outcome: Not Progressing  Intervention: Develop Pain Management Plan  Recent Flowsheet Documentation  Taken 4/1/2024 0741 by Rhina Gonzalez RN  Pain Management Interventions: medication (see MAR)  Taken 4/1/2024 0522 by Rhina Gonzalez" LORENZO, RN  Pain Management Interventions: medication (see MAR)  Taken 4/1/2024 0243 by Rhina Gonzalez, RN  Pain Management Interventions:   medication (see MAR)   cold applied  Taken 3/31/2024 2107 by Rhina Gonzalez, RN  Pain Management Interventions: medication (see MAR)  Intervention: Prevent or Manage Pain  Recent Flowsheet Documentation  Taken 3/31/2024 2107 by Rhina Gonzalez, RN  Medication Review/Management: medications reviewed

## 2024-04-01 NOTE — PROCEDURES
[facility]    Triple Lumen PICC Placement    Date/Time: 4/1/2024 5:13 PM    Performed by: Norma Duran RN  Authorized by: Fausto Heller MD  Indications: vascular access      UNIVERSAL PROTOCOL   Site Marked: Yes  Prior Images Obtained and Reviewed:  No  Required items: Required blood products, implants, devices and special equipment available    Patient identity confirmed:  Verbally with patient, arm band, provided demographic data and hospital-assigned identification number  NA - No sedation, light sedation, or local anesthesia  Confirmation Checklist:  Patient's identity using two indicators, relevant allergies, procedure was appropriate and matched the consent or emergent situation and correct equipment/implants were available  Time out: Immediately prior to the procedure a time out was called    Universal Protocol: the Joint Commission Universal Protocol was followed    Preparation: Patient was prepped and draped in usual sterile fashion    ESBL (mL):  1     ANESTHESIA    Anesthesia:  Local infiltration  Local Anesthetic:  Lidocaine 1% without epinephrine  Anesthetic Total (mL):  2      SEDATION    Patient Sedated: No        Preparation: skin prepped with ChloraPrep  Skin prep agent: skin prep agent completely dried prior to procedure  Sterile barriers: maximum sterile barriers were used: cap, mask, sterile gown, sterile gloves, and large sterile sheet  Hand hygiene: hand hygiene performed prior to central venous catheter insertion  Type of line used: PICC  Catheter type: triple lumen  Lumen type: valved and power PICC  Lumen Identification: Red, White and Gray  Catheter size: 5 Fr  Brand: Bard  Lot number: [id number]  Placement method: venipuncture, MST and ultrasound  Number of attempts: 1  Difficulty threading catheter: no  Successful placement: no  Orientation: left  Catheter to Vein (%): 20  Location: basilic vein  Tip Location: SVC  Arm circumference: adults 10  cm  Extremity circumference: 44  Visible catheter length: 2  Total catheter length: 44  Dressing and securement: subcutaneous anchor securement system, chlorhexidine disc applied and transparent securement dressing  Post procedure assessment: blood return through all ports and placement verified by x-ray  PROCEDURE   Patient Tolerance:  Patient tolerated the procedure well with no immediate complicationsDescribe Procedure: Pt tolerated picc placement well. Good blood return. Flushes easily. Negative left internal jugular for catheter. Placement verified by xray. Ok to use.    Ok to use  Disposal: sharps and needle count correct at the end of procedure, needles and guidewire disposed in sharps container

## 2024-04-02 ENCOUNTER — APPOINTMENT (OUTPATIENT)
Dept: OCCUPATIONAL THERAPY | Facility: CLINIC | Age: 34
DRG: 074 | End: 2024-04-02
Attending: INTERNAL MEDICINE
Payer: COMMERCIAL

## 2024-04-02 ENCOUNTER — APPOINTMENT (OUTPATIENT)
Dept: INTERVENTIONAL RADIOLOGY/VASCULAR | Facility: CLINIC | Age: 34
DRG: 074 | End: 2024-04-02
Attending: INTERNAL MEDICINE
Payer: COMMERCIAL

## 2024-04-02 ENCOUNTER — APPOINTMENT (OUTPATIENT)
Dept: PHYSICAL THERAPY | Facility: CLINIC | Age: 34
DRG: 074 | End: 2024-04-02
Attending: INTERNAL MEDICINE
Payer: COMMERCIAL

## 2024-04-02 LAB
ANION GAP SERPL CALCULATED.3IONS-SCNC: 13 MMOL/L (ref 7–15)
BUN SERPL-MCNC: 12.6 MG/DL (ref 6–20)
CALCIUM SERPL-MCNC: 8.7 MG/DL (ref 8.6–10)
CHLORIDE SERPL-SCNC: 108 MMOL/L (ref 98–107)
CREAT SERPL-MCNC: 0.54 MG/DL (ref 0.51–0.95)
DEPRECATED HCO3 PLAS-SCNC: 20 MMOL/L (ref 22–29)
EGFRCR SERPLBLD CKD-EPI 2021: >90 ML/MIN/1.73M2
ERYTHROCYTE [DISTWIDTH] IN BLOOD BY AUTOMATED COUNT: 12.6 % (ref 10–15)
GLUCOSE BLDC GLUCOMTR-MCNC: 105 MG/DL (ref 70–99)
GLUCOSE SERPL-MCNC: 151 MG/DL (ref 70–99)
HCG UR QL: NEGATIVE
HCT VFR BLD AUTO: 39.4 % (ref 35–47)
HGB BLD-MCNC: 13.1 G/DL (ref 11.7–15.7)
MCH RBC QN AUTO: 30.4 PG (ref 26.5–33)
MCHC RBC AUTO-ENTMCNC: 33.2 G/DL (ref 31.5–36.5)
MCV RBC AUTO: 91 FL (ref 78–100)
PATH REPORT.COMMENTS IMP SPEC: NORMAL
PATH REPORT.FINAL DX SPEC: NORMAL
PATH REPORT.GROSS SPEC: NORMAL
PATH REPORT.MICROSCOPIC SPEC OTHER STN: NORMAL
PATH REPORT.RELEVANT HX SPEC: NORMAL
PLATELET # BLD AUTO: 258 10E3/UL (ref 150–450)
POTASSIUM SERPL-SCNC: 3.6 MMOL/L (ref 3.4–5.3)
RBC # BLD AUTO: 4.31 10E6/UL (ref 3.8–5.2)
SODIUM SERPL-SCNC: 141 MMOL/L (ref 135–145)
WBC # BLD AUTO: 7.1 10E3/UL (ref 4–11)

## 2024-04-02 PROCEDURE — 87340 HEPATITIS B SURFACE AG IA: CPT | Performed by: INTERNAL MEDICINE

## 2024-04-02 PROCEDURE — 97530 THERAPEUTIC ACTIVITIES: CPT | Mod: GP

## 2024-04-02 PROCEDURE — 36514 APHERESIS PLASMA: CPT

## 2024-04-02 PROCEDURE — 999N000040 HC STATISTIC CONSULT NO CHARGE VASC ACCESS

## 2024-04-02 PROCEDURE — 250N000011 HC RX IP 250 OP 636: Performed by: INTERNAL MEDICINE

## 2024-04-02 PROCEDURE — 97530 THERAPEUTIC ACTIVITIES: CPT | Mod: GO

## 2024-04-02 PROCEDURE — 97165 OT EVAL LOW COMPLEX 30 MIN: CPT | Mod: GO

## 2024-04-02 PROCEDURE — 250N000011 HC RX IP 250 OP 636: Performed by: PHYSICIAN ASSISTANT

## 2024-04-02 PROCEDURE — 82374 ASSAY BLOOD CARBON DIOXIDE: CPT | Performed by: INTERNAL MEDICINE

## 2024-04-02 PROCEDURE — 120N000001 HC R&B MED SURG/OB

## 2024-04-02 PROCEDURE — 02HV33Z INSERTION OF INFUSION DEVICE INTO SUPERIOR VENA CAVA, PERCUTANEOUS APPROACH: ICD-10-PCS | Performed by: RADIOLOGY

## 2024-04-02 PROCEDURE — 272N000196 HC ACCESSORY CR5

## 2024-04-02 PROCEDURE — 250N000009 HC RX 250: Performed by: INTERNAL MEDICINE

## 2024-04-02 PROCEDURE — 250N000013 HC RX MED GY IP 250 OP 250 PS 637: Performed by: INTERNAL MEDICINE

## 2024-04-02 PROCEDURE — 250N000011 HC RX IP 250 OP 636: Performed by: RADIOLOGY

## 2024-04-02 PROCEDURE — 99222 1ST HOSP IP/OBS MODERATE 55: CPT | Performed by: INTERNAL MEDICINE

## 2024-04-02 PROCEDURE — 97162 PT EVAL MOD COMPLEX 30 MIN: CPT | Mod: GP

## 2024-04-02 PROCEDURE — 99232 SBSQ HOSP IP/OBS MODERATE 35: CPT | Performed by: INTERNAL MEDICINE

## 2024-04-02 PROCEDURE — 258N000003 HC RX IP 258 OP 636: Performed by: INTERNAL MEDICINE

## 2024-04-02 PROCEDURE — C1750 CATH, HEMODIALYSIS,LONG-TERM: HCPCS

## 2024-04-02 PROCEDURE — 0JH63XZ INSERTION OF TUNNELED VASCULAR ACCESS DEVICE INTO CHEST SUBCUTANEOUS TISSUE AND FASCIA, PERCUTANEOUS APPROACH: ICD-10-PCS | Performed by: RADIOLOGY

## 2024-04-02 PROCEDURE — 81025 URINE PREGNANCY TEST: CPT | Performed by: PHYSICIAN ASSISTANT

## 2024-04-02 PROCEDURE — 99152 MOD SED SAME PHYS/QHP 5/>YRS: CPT

## 2024-04-02 PROCEDURE — P9045 ALBUMIN (HUMAN), 5%, 250 ML: HCPCS | Mod: JZ | Performed by: INTERNAL MEDICINE

## 2024-04-02 PROCEDURE — 85027 COMPLETE CBC AUTOMATED: CPT | Performed by: INTERNAL MEDICINE

## 2024-04-02 PROCEDURE — C1769 GUIDE WIRE: HCPCS

## 2024-04-02 RX ORDER — CLINDAMYCIN PHOSPHATE 900 MG/50ML
900 INJECTION, SOLUTION INTRAVENOUS
Status: COMPLETED | OUTPATIENT
Start: 2024-04-02 | End: 2024-04-02

## 2024-04-02 RX ORDER — FLUMAZENIL 0.1 MG/ML
0.2 INJECTION, SOLUTION INTRAVENOUS
Status: DISCONTINUED | OUTPATIENT
Start: 2024-04-02 | End: 2024-04-02

## 2024-04-02 RX ORDER — NALOXONE HYDROCHLORIDE 0.4 MG/ML
0.4 INJECTION, SOLUTION INTRAMUSCULAR; INTRAVENOUS; SUBCUTANEOUS
Status: DISCONTINUED | OUTPATIENT
Start: 2024-04-02 | End: 2024-04-02

## 2024-04-02 RX ORDER — DIPHENHYDRAMINE HYDROCHLORIDE 50 MG/ML
50 INJECTION INTRAMUSCULAR; INTRAVENOUS
Status: COMPLETED | OUTPATIENT
Start: 2024-04-02 | End: 2024-04-02

## 2024-04-02 RX ORDER — NALOXONE HYDROCHLORIDE 0.4 MG/ML
0.2 INJECTION, SOLUTION INTRAMUSCULAR; INTRAVENOUS; SUBCUTANEOUS
Status: DISCONTINUED | OUTPATIENT
Start: 2024-04-02 | End: 2024-04-02

## 2024-04-02 RX ORDER — CETIRIZINE HYDROCHLORIDE 10 MG/1
10 TABLET ORAL AT BEDTIME
Status: DISCONTINUED | OUTPATIENT
Start: 2024-04-02 | End: 2024-04-04

## 2024-04-02 RX ORDER — DIPHENHYDRAMINE HYDROCHLORIDE 50 MG/ML
50 INJECTION INTRAMUSCULAR; INTRAVENOUS ONCE
Status: CANCELLED | OUTPATIENT
Start: 2024-04-02

## 2024-04-02 RX ORDER — FENTANYL CITRATE 50 UG/ML
25-50 INJECTION, SOLUTION INTRAMUSCULAR; INTRAVENOUS EVERY 5 MIN PRN
Status: DISCONTINUED | OUTPATIENT
Start: 2024-04-02 | End: 2024-04-02

## 2024-04-02 RX ORDER — ALBUMIN HUMAN 25 %
4750 INTRAVENOUS SOLUTION INTRAVENOUS ONCE
Status: DISCONTINUED | OUTPATIENT
Start: 2024-04-02 | End: 2024-04-02

## 2024-04-02 RX ORDER — HEPARIN SODIUM 1000 [USP'U]/ML
1000-10000 INJECTION, SOLUTION INTRAVENOUS; SUBCUTANEOUS ONCE
Status: COMPLETED | OUTPATIENT
Start: 2024-04-02 | End: 2024-04-02

## 2024-04-02 RX ADMIN — HYDROMORPHONE HYDROCHLORIDE 2 MG: 2 TABLET ORAL at 00:09

## 2024-04-02 RX ADMIN — ACETAMINOPHEN 1000 MG: 500 TABLET, FILM COATED ORAL at 20:31

## 2024-04-02 RX ADMIN — PSYLLIUM HUSK 1 PACKET: 3.4 POWDER ORAL at 09:16

## 2024-04-02 RX ADMIN — CALCIUM GLUCONATE 5 G: 98 INJECTION, SOLUTION INTRAVENOUS at 16:46

## 2024-04-02 RX ADMIN — FENTANYL CITRATE 50 MCG: 50 INJECTION, SOLUTION INTRAMUSCULAR; INTRAVENOUS at 15:38

## 2024-04-02 RX ADMIN — HYDROMORPHONE HYDROCHLORIDE 2 MG: 2 TABLET ORAL at 23:28

## 2024-04-02 RX ADMIN — MIDAZOLAM 0.5 MG: 1 INJECTION INTRAMUSCULAR; INTRAVENOUS at 15:32

## 2024-04-02 RX ADMIN — PREGABALIN 100 MG: 100 CAPSULE ORAL at 09:20

## 2024-04-02 RX ADMIN — FERROUS SULFATE TAB 325 MG (65 MG ELEMENTAL FE) 325 MG: 325 (65 FE) TAB at 10:38

## 2024-04-02 RX ADMIN — SODIUM CHLORIDE: 9 INJECTION, SOLUTION INTRAVENOUS at 22:26

## 2024-04-02 RX ADMIN — MONTELUKAST 10 MG: 10 TABLET, FILM COATED ORAL at 20:31

## 2024-04-02 RX ADMIN — PANTOPRAZOLE SODIUM 40 MG: 40 TABLET, DELAYED RELEASE ORAL at 09:20

## 2024-04-02 RX ADMIN — SODIUM CHLORIDE: 9 INJECTION, SOLUTION INTRAVENOUS at 13:30

## 2024-04-02 RX ADMIN — HYDROMORPHONE HYDROCHLORIDE 2 MG: 2 TABLET ORAL at 18:46

## 2024-04-02 RX ADMIN — SODIUM CHLORIDE: 9 INJECTION, SOLUTION INTRAVENOUS at 00:16

## 2024-04-02 RX ADMIN — HYDROMORPHONE HYDROCHLORIDE 2 MG: 2 TABLET ORAL at 06:49

## 2024-04-02 RX ADMIN — MIDAZOLAM 1 MG: 1 INJECTION INTRAMUSCULAR; INTRAVENOUS at 15:26

## 2024-04-02 RX ADMIN — SODIUM CHLORIDE 1000 MG: 9 INJECTION, SOLUTION INTRAVENOUS at 05:21

## 2024-04-02 RX ADMIN — HYDROMORPHONE HYDROCHLORIDE 2 MG: 2 TABLET ORAL at 12:45

## 2024-04-02 RX ADMIN — DIPHENHYDRAMINE HYDROCHLORIDE 50 MG: 50 INJECTION, SOLUTION INTRAMUSCULAR; INTRAVENOUS at 14:38

## 2024-04-02 RX ADMIN — PREGABALIN 200 MG: 100 CAPSULE ORAL at 21:37

## 2024-04-02 RX ADMIN — CETIRIZINE HYDROCHLORIDE 10 MG: 10 TABLET, FILM COATED ORAL at 21:37

## 2024-04-02 RX ADMIN — ACETAMINOPHEN 1000 MG: 500 TABLET, FILM COATED ORAL at 11:11

## 2024-04-02 RX ADMIN — CLINDAMYCIN PHOSPHATE 900 MG: 900 INJECTION, SOLUTION INTRAVENOUS at 14:25

## 2024-04-02 RX ADMIN — HEPARIN SODIUM 4000 UNITS: 1000 INJECTION INTRAVENOUS; SUBCUTANEOUS at 15:51

## 2024-04-02 RX ADMIN — ANTICOAGULANT CITRATE DEXTROSE SOLUTION FORMULA A 1000 ML: 12.25; 11; 3.65 SOLUTION INTRAVENOUS at 16:47

## 2024-04-02 RX ADMIN — ALBUMIN HUMAN 4500 ML: 0.05 INJECTION, SOLUTION INTRAVENOUS at 17:01

## 2024-04-02 RX ADMIN — Medication 50 MCG: at 10:41

## 2024-04-02 ASSESSMENT — ACTIVITIES OF DAILY LIVING (ADL)
ADLS_ACUITY_SCORE: 52
ADLS_ACUITY_SCORE: 52
ADLS_ACUITY_SCORE: 60
ADLS_ACUITY_SCORE: 56
ADLS_ACUITY_SCORE: 52
ADLS_ACUITY_SCORE: 60
ADLS_ACUITY_SCORE: 56
ADLS_ACUITY_SCORE: 52
ADLS_ACUITY_SCORE: 50
ADLS_ACUITY_SCORE: 52
ADLS_ACUITY_SCORE: 60
ADLS_ACUITY_SCORE: 52
ADLS_ACUITY_SCORE: 50
ADLS_ACUITY_SCORE: 50
ADLS_ACUITY_SCORE: 52
ADLS_ACUITY_SCORE: 60
ADLS_ACUITY_SCORE: 60
ADLS_ACUITY_SCORE: 56
ADLS_ACUITY_SCORE: 52
ADLS_ACUITY_SCORE: 50

## 2024-04-02 NOTE — PLAN OF CARE
"Pt up with lift. A&O. VSS No behaviors, sitter in room. Minimal movement in BLE d/t wkns, denies numbness. Had LP, had syncopal episode during procedure, has c/o HA since returning from procedure. Taking po Dilaudid & Tyl for pain control. Avis diet, denies nausea. PICC placed. Plan to transfer to [facility].   Goal Outcome Evaluation:      Plan of Care Reviewed With: patient    Overall Patient Progress: no changeOverall Patient Progress: no change    Outcome Evaluation: Had LP, HA after, Dilaudid & Tyl for pain, minimal movement in BLE, no numbness      Problem: Adult Inpatient Plan of Care  Goal: Plan of Care Review  Description: The Plan of Care Review/Shift note should be completed every shift.  The Outcome Evaluation is a brief statement about your assessment that the patient is improving, declining, or no change.  This information will be displayed automatically on your shift  note.  Outcome: Not Progressing  Flowsheets (Taken 4/1/2024 1949)  Outcome Evaluation: Had LP, HA after, Dilaudid & Tyl for pain, minimal movement in BLE, no numbness  Plan of Care Reviewed With: patient  Overall Patient Progress: no change  Goal: Patient-Specific Goal (Individualized)  Description: You can add care plan individualizations to a care plan. Examples of Individualization might be:  \"Parent requests to be called daily at 9am for status\", \"I have a hard time hearing out of my right ear\", or \"Do not touch me to wake me up as it startles  me\".  Outcome: Not Progressing  Goal: Absence of Hospital-Acquired Illness or Injury  Outcome: Not Progressing  Intervention: Prevent Skin Injury  Recent Flowsheet Documentation  Taken 4/1/2024 1600 by Carli Tripp, RN  Body Position: position changed independently  Intervention: Prevent and Manage VTE (Venous Thromboembolism) Risk  Recent Flowsheet Documentation  Taken 4/1/2024 1600 by Carli Tripp, RN  VTE Prevention/Management:   SCDs (sequential compression devices) off   patient refused " intervention  Goal: Optimal Comfort and Wellbeing  Outcome: Not Progressing  Goal: Readiness for Transition of Care  Outcome: Not Progressing     Problem: Pain Acute  Goal: Optimal Pain Control and Function  Outcome: Not Progressing

## 2024-04-02 NOTE — PHARMACY-ADMISSION MEDICATION HISTORY
Admission medication history completed at [facility]. Please see Pharmacist Admission Medication History note from 03/30/2024.

## 2024-04-02 NOTE — H&P
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 body removal;  Surgeon: Bailey Arnold MD;  Location: [facility];  Service: Gastroenterology    DE ESOPHAGOGASTRODUODENOSCOPY TRANSORAL DIAGNOSTIC N/A 10/3/2019    Procedure: ESOPHAGOGASTRODUODENOSCOPY (EGD), REMOVAL OF FOREIGN BODY;  Surgeon: Chris Lira MD;  Location: [facility] OR;  Service: Gastroenterology    TN ESOPHAGOGASTRODUODENOSCOPY TRANSORAL DIAGNOSTIC N/A 10/6/2019    Procedure: ESOPHAGOGASTRODUODENOSCOPY (EGD) with attempted foreign body removal;  Surgeon: Felipe Connolly MD;  Location: [facility] GI;  Service: Gastroenterology    TN ESOPHAGOGASTRODUODENOSCOPY TRANSORAL DIAGNOSTIC N/A 12/15/2019    Procedure: ESOPHAGOGASTRODUODENOSCOPY (EGD) with foreign body removal;  Surgeon: Jeffy Zuñiga MD;  Location: [facility];  Service: Gastroenterology    TN ESOPHAGOGASTRODUODENOSCOPY TRANSORAL DIAGNOSTIC N/A 12/17/2019    Procedure: ESOPHAGOGASTRODUODENOSCOPY (EGD) with attempted foreign body removal;  Surgeon: Felipe Connolly MD;  Location: [facility];  Service: Gastroenterology    TN ESOPHAGOGASTRODUODENOSCOPY TRANSORAL DIAGNOSTIC N/A 12/21/2019    Procedure: ESOPHAGOGASTRODUODENOSCOPY (EGD) FOR FROEIGN BODY REMOVAL;  Surgeon: Binu Vigil MD;  Location: [facility] OR;  Service: Gastroenterology    TN ESOPHAGOGASTRODUODENOSCOPY TRANSORAL DIAGNOSTIC N/A 7/22/2020    Procedure: ESOPHAGOGASTRODUODENOSCOPY (EGD);  Surgeon: Bialey Arnold MD;  Location: [facility] OR;  Service: Gastroenterology    TN ESOPHAGOGASTRODUODENOSCOPY TRANSORAL DIAGNOSTIC N/A 8/14/2020    Procedure: ESOPHAGOGASTRODUODENOSCOPY (EGD) FOREIGN BODY REMOVAL;  Surgeon: Jeffy Zuñiga MD;  Location: [facility] OR;  Service: Gastroenterology    TN ESOPHAGOGASTRODUODENOSCOPY TRANSORAL DIAGNOSTIC N/A 2/25/2021    Procedure: ESOPHAGOGASTRODUODENOSCOPY (EGD) with foreign body reoval;  Surgeon: Bird Sethi MD;  Location: [facility];  Service: Gastroenterology    TN ESOPHAGOGASTRODUODENOSCOPY TRANSORAL DIAGNOSTIC N/A 4/19/2021    Procedure: ESOPHAGOGASTRODUODENOSCOPY (EGD);  Surgeon: Libia Rose MD;  Location: [facility] Main OR;  Service: Gastroenterology    TN SURG DIAGNOSTIC EXAM, ANORECTAL N/A 2/5/2020    Procedure: EXAM UNDER ANESTHESIA,  Flexible Sigmoidoscopy, Retrieval of Foreign Body;  Surgeon: Sasha Ivan MD;  Location: [facility] OR;  Service: General    RELEASE CARPAL TUNNEL Bilateral     RELEASE CARPAL TUNNEL Bilateral     REMOVAL, FOREIGN BODY, RECTUM N/A 7/21/2021    Procedure: MANUAL RETREIVALOF FOREIGN OBJECT- RECTUM.;  Surgeon: Aleksandra Gerber MD;  Location: [facility] OR    SIGMOIDOSCOPY FLEXIBLE N/A 1/10/2017    Procedure: SIGMOIDOSCOPY FLEXIBLE;  Surgeon: Annmarie Haynes MD;  Location: [facility] OR    SIGMOIDOSCOPY FLEXIBLE N/A 5/8/2018    Procedure: SIGMOIDOSCOPY FLEXIBLE;  flex sig with foreign body removal using snare and rattooth forcep;  Surgeon: Soham Cano MD;  Location:  GI    SIGMOIDOSCOPY FLEXIBLE N/A 2/20/2019    Procedure: Exam under anesthesia Colonoscopy with attempted  removal of rectal foreign body;  Surgeon: Estrada Chávez MD;  Location: [facility] OR       Prior to Admission Medications   Prior to Admission Medications   Prescriptions Last Dose Informant Patient Reported? Taking?   Cholecalciferol (D3 HIGH POTENCY) 25 MCG (1000 UT) CAPS   Yes No   Sig: Take 50 mcg by mouth daily   PSYLLIUM PO   Yes No   Sig: Take 1 tsp by mouth daily   Respiratory Therapy Supplies (NEBULIZER) BRENDAN  Self No No   Sig: Nebulizer device.  Albuterol nebulization every 2 hours as needed for shortness of breath or wheezing.   Semaglutide-Weight Management (WEGOVY) 1 MG/0.5ML pen   No No   Sig: Inject 1 mg Subcutaneous once a week   acetaminophen (TYLENOL) 500 MG tablet   No No   Sig: Take 2 tablets (1,000 mg) by mouth every 6 hours as needed for pain or fever   albuterol (PROAIR HFA/PROVENTIL HFA/VENTOLIN HFA) 108 (90 Base) MCG/ACT inhaler  Self No No   Sig: Inhale 2 puffs into the lungs every 6 hours as needed for shortness of breath / dyspnea or wheezing   albuterol (PROVENTIL) (2.5 MG/3ML) 0.083% neb solution   No No   Sig: Take 1 vial (2.5 mg) by nebulization every 6 hours as needed for shortness of breath or wheezing    benzonatate (TESSALON) 100 MG capsule   No No   Sig: Take 1 capsule (100 mg) by mouth 3 times daily as needed for cough   cetirizine (ZYRTEC) 10 MG tablet  Self No No   Sig: Take 1 tablet (10 mg) by mouth daily   clonazePAM (KLONOPIN) 0.5 MG tablet   Yes No   Sig: Take 0.5mg daily PRN anxiety- 20 tablets per month, try to keep it to 3-4x per week   cyclobenzaprine (FLEXERIL) 10 MG tablet   No No   Sig: Take 1 tablet (10 mg) by mouth 3 times daily as needed for muscle spasms   ferrous sulfate (FEROSUL) 325 (65 Fe) MG tablet  Self No No   Sig: Take 1 tablet (325 mg) by mouth daily (with breakfast)   montelukast (SINGULAIR) 10 MG tablet  Self Yes No   Sig: Take 10 mg by mouth every evening   norethindrone (AYGESTIN) 5 MG tablet   Yes No   Sig: Take 5 mg by mouth daily   ondansetron (ZOFRAN-ODT) 4 MG ODT tab  Self No No   Sig: Take 1 tablet (4 mg) by mouth every 8 hours as needed for nausea   pantoprazole (PROTONIX) 40 MG EC tablet   Yes No   Sig: Take 40 mg by mouth daily   polyethylene glycol (MIRALAX) 17 GM/Dose powder   Yes No   Sig: Take 17 g by mouth daily as needed for constipation   pregabalin (LYRICA) 100 MG capsule   Yes No   Sig: Take 100 mg by mouth every morning   pregabalin (LYRICA) 100 MG capsule   Yes No   Sig: Take 200 mg by mouth at bedtime   valACYclovir (VALTREX) 1000 mg tablet  Self Yes No   Sig: Take 2,000 mg by mouth 2 times daily as needed Take 2 tablets by mouth two times daily for one day. Use as needed at onset of cold sore.      Facility-Administered Medications: None        Review of Systems    The 10 point Review of Systems is negative other than noted in the HPI or here.     Social History   I have reviewed this patient's social history and updated it with pertinent information if needed.  Social History     Tobacco Use    Smoking status: Never    Smokeless tobacco: Never   Substance Use Topics    Alcohol use: No     Alcohol/week: 0.0 standard drinks of alcohol    Drug use: No          Family History   I have reviewed this patient's family history and updated it with pertinent information if needed.  Family History   Problem Relation Age of Onset    Diabetes Type 2  Maternal Grandmother     Diabetes Type 2  Paternal Grandmother     Breast Cancer Paternal Grandmother     Cerebrovascular Disease Father 53    Myocardial Infarction No family hx of     Coronary Artery Disease Early Onset No family hx of     Ovarian Cancer No family hx of     Colon Cancer No family hx of     Depression Mother     Anxiety Disorder Mother          Allergies   Allergies   Allergen Reactions    Amoxicillin-Pot Clavulanate Other (See Comments), Swelling and Rash     PN: facial swelling, left side. Also had cortisone injection the same day as she started the Augmentin.  Noted in downtime recovery of chart.    PN: facial swelling, left side. Also had cortisone injection the same day as she started the Augmentin.; HUT Comment: PN: facial swelling, left side. Also had cortisone injection the same day as she started the Augmentin.Noted in downtime recovery of chart.; HUT Reaction: Rash; HUT Reaction: Unknown; HUT Reaction Type: Allergy; HUT Severity: Med; HUT Noted: [id number]  PN: facial swelling, left side. Also had cortisone injection the same day as she started the Augmentin.  Other reaction(s): *Unknown  PN: facial swelling, left side. Also had cortisone injection the same day as she started the Augmentin.  Noted in downtime recovery of chart.  PN: facial swelling, left side. Also had cortisone injection the same day as she started the Augmentin.  Other reaction(s): Facial swelling  Other reaction(s): Facial swelling    Hydrocodone Nausea and Vomiting and GI Disturbance     vomiting for days, PN: vomiting for days; HUT Comment: vomiting for days; HUT Reaction: Gastrointestinal; HUT Reaction: Nausea And Vomiting; HUT Reaction Type: Intolerance; HUT Severity: Med; HUT Noted: [id number]  vomiting for days    Other  reaction(s): Rash    Hydrocodone-Acetaminophen Nausea and Vomiting and Rash     Update on 12/12  Pt says she can take tylenol just not the hydrocodone.   Other reaction(s): Rash      Influenza Vaccines Other (See Comments) and Nausea and Vomiting     HUT Reaction: Nausea And Vomiting; HUT Reaction Type: Intolerance; HUT Severity: Low; HUT Noted: [id number]    Latex Rash     HUT Reaction: Rash; HUT Reaction Type: Allergy; HUT Severity: Low; HUT Noted: [id number]  Other reaction(s): Rash      Oseltamivir Hives     med stopped, PN: med stopped  med stopped, PN: med stopped; HUT Comment: med stopped, PN: med stopped; HUT Reaction: Hives; HUT Reaction Type: Allergy; HUT Severity: Med; HUT Noted: [id number]    Penicillins Anaphylaxis     HUT Reaction: Anaphylaxis; HUT Reaction Type: Allergy; HUT Severity: High; HUT Noted: 20150904    Vancomycin Itching, Swelling and Rash     Other reaction(s): Redness  Flushed face, very itchy; HUT Comment: Flushed face, very itchy; HUT Reaction: Angioedema; HUT Reaction: Redness; HUT Severity: Med; HUT Noted: [id number]    facial    Blood-Group Specific Substance Other (See Comments)     Patient has an anti-Cw and non-specific antibodies. Blood product orders may be delayed. Draw one red top and two purple top tubes for all type/screen/crossmatch orders.  Patient has anti-Cw, anti-K ([place]), Warm auto and nonspecific antibodies. Blood products may be delayed. Draw patient 24 hours prior to transfusion. Draw one red top and two purple top tubes for all type and screen orders.    Clavulanic Acid Angioedema    Fentanyl Itching    Haemophilus B Polysaccharide Vaccine Nausea and Vomiting    Naltrexone Other (See Comments)     Reaction(s): Vivid dreams.    Other Drug Allergy (See Comments)      See original file MRN [id number]. Files are marked for merge    Oxycodone Swelling    Adhesive Tape Rash     Silicone type  Silicone type    Other reaction(s): adhesive allergy  Other reaction(s): adhesive  allergy  Silicone type    Other reaction(s): adhesive allergy      Band-Aid Anti-Itch      Other reaction(s): adhesive allergy    Cephalosporins Rash    Lamotrigine Rash     Possibly associated with Lamictal.   HUT Comment: Possibly associated with Lamictal. ; HUT Reaction: Rash; HUT Reaction Type: Allergy; HUT Severity: Low; HUT Noted: [id number]    No Clinical Screening - See Comments Rash and Other (See Comments)     Silicone type  Silicone type  See original file MRN [id number]. Files are marked for merge  History of swallowing sharp metallic objects. She should not be prescribed lancets due to posed risk of swallowing.         Physical Exam   Vital Signs: Temp: 98.4  F (36.9  C) Temp src: Oral BP: 124/81       SpO2: 95 % O2 Device: None (Room air)    Weight: 0 lbs 0 oz    General Appearance: Awake, alert, no acute distress  Respiratory: Bilateral air entry, no wheezing, no rales, no crackles  Cardiovascular: S1-S2, RRR, no murmurs, no rubs  GI: Abdomen is soft, obese, nontender, bowel sounds are present  Skin: Intact, no rashes, no cyanosis  Neuro: AAOx3, motor strength 0-1/5 bilateral lower extremities, 4/5 bilateral upper extremities, sensorial grossly intact  Psych-normal mood    Medical Decision Making       MANAGEMENT DISCUSSED with the following over the past 24 hours: The patient, RN, neurology on-call   NOTE(S)/MEDICAL RECORDS REVIEWED over the past 24 hours: Neurology and psychiatry notes, prior hospitalization notes  Tests REVIEWED in the past 24 hours:  - BMP  - CBC  - LP  Tests personally interpreted in the past 24 hours:  - MRI T-spine and L-spine showing as above       Data         Imaging results reviewed over the past 24 hrs:   Recent Results (from the past 24 hour(s))   XR Chest Port 1 View    Narrative    EXAM: XR CHEST PORT 1 VIEW  LOCATION: [facility]  DATE: 4/1/2024    INDICATION: power flush  COMPARISON: 03/30/2024      Impression    IMPRESSION: New left PICC with  distal tip in the distal SVC. This is in good position. No complications identified. Moderate thoracolumbar spinal curvature. No acute cardiopulmonary abnormalities.

## 2024-04-02 NOTE — PROGRESS NOTES
Treatment in room D39-1 using Optia apheresis machine. Consent verified.     Height: 5 ft 2 in  Weight: 114 kg  HCT: 39%  Inlet speed: 75  ACDA ratio: 10:1  Fluid balance: 100  Access: right CVC okay to use per IR RN.     Replacement product: 5% Albumin 4500 mL  Electrolyte replacement: Ca Gluconate 5 grams in NS - total 150 mls, piggybacked into return lines, infused over time of treatment.    Premedications: none     Treatment Notes: pt tolerated tx well. Dr. Lovell saw pt during tx.      Treatment completed as ordered, VSS, see EPIC flowsheet for run data.     Next treatment: Thursday 4/4/2024.    Nguyen GRACE Aphereslaura MORALES

## 2024-04-02 NOTE — PROGRESS NOTES
[facility]    Hospitalist Progress Note    Assessment & Plan   Nevin Alvarado is a 33 year old female with complex PMHx which includes hx of CIDP with neuropathy, bipolar disorder, borderline personality disorder, depression, anxiety, chronic pain and history of self-injurious behavior with numerous foreign body ingestion in the past, morbid obesity and ZOHRA.  She was initially admitted to [facility] on 3/30/2024 for evaluation of acute lower extremity paresis.  Additional workup was pursued including MRI of lumbar spine and an LP.  She was seen by general neurology service who recommended treatment with plasma exchange.  She was subsequently transferred to [facility] on 4/1/2024 to coordinate plasma exchange treatment.    Acute bilateral lower extremity paralysis  CIDP flare  Chronic bilateral LE neuropathy  *Has history of CIDP with peripheral neuropathy.  She underwent treatments with IVIG in the past.  *Initially presented to [facility] ED on 3/30/2024 for evaluation of new onset back pain and bilateral lower extremity weakness (mostly experiences numbness, so weakness was a new symptom for her).  She was unable to ambulate.  *In the ED, labs were generally unremarkable.  General neurology was consulted.  She underwent further evaluation with MRI of the thoracic and lumbar spines.  MRI of the thoracic spine was unremarkable.  MRI lumbar spine showed enlargement of the cauda equina nerve roots and enhancement which was suggestive of CIDP per neurology.  She will underwent a lumbar puncture which showed elevated protein and albumin cytologic dissociation which confirmed the diagnosis of CIDP.  She was given 1 g of IV Solu-Medrol and transferred to [facility] to initiate plasma exchange.  PICC line was placed.  -- ongoing mgmt per general neurology, cont Solu-Medrol 1g IV daily until plasma exchange has been initiated  -- plasma exchange  "per nephrology, anticipate need for placement of tunneled catheter  -- cont routine neurochecks and fall precautions  -- cont PTA Lyrica (100mg in AM and 200mg HS)  -- PT/OT    Anxiety  Depression  Bipolar disorder  Chronic pain   Hx suspected medication seeking behavior  Frequent medical noncompliance  Frequent utilization of healthcare system  *Complicated psychiatric history.  Patient has a guardian and noted ED treatment plan.  She stated to admitting provider that she may be able to come off her guardianship as \"she is doing really well\" and she was planning to move out of her group home and back to an apartment.  *Psychiatry was consulted while she was at [facility], she was seen by DEC counselor while in the ED.  [place] that she was medically cleared to discharge once medical issues had stabilized.  *Conts on Lyrica as above and Klonopin as needed.    Hx self injurious behavior w/foreign body ingestion  *Hx of 47 endoscopies in last 4 years for foreign body extractions ([place], mickie hair pins, etc)    Chronic abdominal pain  *Noted subacute/chronic issue. Seen at [facility] on 3/29/24 and had normal EGD then left AMA; path report negative for dysplasia, gastritis, or Helicobacter.  *Abx xray 3/31 was nl.   *Chronic and stable on PPI.     Morbid obesity  *Is on Wegovy, reports weight loss of 60 pounds recently. Was adamant to receive this medication while she was at [facility], given on 3/31/24.    FEN: NS @75ml/h, lytes stable, NPO for now  DVT Prophylaxis: PCDs  Code Status: Full Code    Clinically Significant Risk Factors Present on Admission                       # Severe Obesity: Estimated body mass index is 46.21 kg/m  as calculated from the following:    Height as of 3/31/24: 1.575 m (5' 2\").    Weight as of 3/31/24: 114.6 kg (252 lb 10.4 oz).       # Financial/Environmental Concerns:           Disposition: Dissipate discharge in 5 to 7 days pending completion of plasma exchange treatments and clinical " improvement.  Await PT/OT assessments in the coming days to discern whether patient will need a rehab stay versus return to her group home. Requesting to meet with dietician this stay to help ensure she gets appropriately supplements.     Brionna Robertson, DO    Medical Decision Making       Please see A&P for additional details of medical decision making.       Interval History   Chart reviewed. Seen this morning. Resting comfortably in bed. No specific complaints. Denies cp/sob/cough, abd pain/n/v. Some back pain following LP yesterday and needing to lie flat, but has since improved. Severeal questions about plans for PLEX.     -Data reviewed today: I reviewed all new labs and imaging results over the last 24 hours. I personally reviewed no images or EKG's today.    Physical Exam   Temp: 98.1  F (36.7  C) Temp src: Oral BP: 113/73 Pulse: 84   Resp: 18 SpO2: 94 % O2 Device: None (Room air)    There were no vitals filed for this visit.  Vital Signs with Ranges  Temp:  [98  F (36.7  C)-98.4  F (36.9  C)] 98.1  F (36.7  C)  Pulse:  [74-86] 84  Resp:  [18] 18  BP: (113-136)/(73-92) 113/73  SpO2:  [94 %-98 %] 94 %  No intake/output data recorded.    Constitutional: Resting comfortably, alert and conversing appropriately, NAD  Respiratory: CTAB, no wheeze, no increased work of breathing  Cardiovascular: HRRR, no MGR, no LE edema  GI: S, NT, ND, +BS  Skin/Integumen: warm/dry  Other:      Medications    sodium chloride 75 mL/hr at 04/02/24 0016      cetirizine  10 mg Oral Daily    ferrous sulfate  325 mg Oral Daily with breakfast    montelukast  10 mg Oral QPM    norethindrone  5 mg Oral Daily    pantoprazole  40 mg Oral Daily    pregabalin  100 mg Oral QAM    pregabalin  200 mg Oral At Bedtime    psyllium  1 packet Oral Daily    sodium chloride (PF)  3 mL Intracatheter Q8H    vitamin D3  50 mcg Oral Daily       Data   Recent Labs   Lab 04/02/24  0518 03/31/24  0740 03/30/24  1504 03/29/24  1242   WBC  --  6.6 9.5  11.2*   HGB  --  13.4 13.9 14.4   MCV  --  93 94 90   PLT  --  298 220 294   NA  --  140 141 142   POTASSIUM  --  4.2 3.8 4.2   CHLORIDE  --  107 107 107   CO2  --  25 22 26   BUN  --  15.5 11.5 8.7   CR  --  0.79 0.69 0.70   ANIONGAP  --  8 12 9   KELBY  --  9.1 9.4 9.6   * 102* 101* 119*   ALBUMIN  --   --   --  4.4   PROTTOTAL  --   --   --  7.2   BILITOTAL  --   --   --  0.3   ALKPHOS  --   --   --  82   ALT  --   --   --  26   AST  --   --   --  17   LIPASE  --   --   --  43       Recent Results (from the past 24 hour(s))   XR Chest Port 1 View    Narrative    EXAM: XR CHEST PORT 1 VIEW  LOCATION: [facility]  DATE: 4/1/2024    INDICATION: power flush  COMPARISON: 03/30/2024      Impression    IMPRESSION: New left PICC with distal tip in the distal SVC. This is in good position. No complications identified. Moderate thoracolumbar spinal curvature. No acute cardiopulmonary abnormalities.

## 2024-04-02 NOTE — PLAN OF CARE
/81 (BP Location: Right arm)   Temp 98.4  F (36.9  C) (Axillary)   LMP 07/12/2023   SpO2 95%     Patient name: Nevin Alvarado    Summary: Patient here with lower extremity weakness, unable to move legs except for very slight localizing,complaining of headache and spinal pain from LP, as well as discomfort from PICC line. Plan is for steroid infusion and PLEX.      Barney Niño, RN  [facility] [address] [facility]  [phone]

## 2024-04-02 NOTE — PROGRESS NOTES
Social work received a call from patient's [facility] care coordinator asking for an update on discharge: Erna [phone]. There is no discharge plan at this time. Erna said she is going to contact patient's  and [facility]  to update them on patient's hospitalization.     MYLES Matta, [facility]   Social Work   [facility]

## 2024-04-02 NOTE — PLAN OF CARE
Pt here with BLE weakness. A&Ox4. Neuros BLE weakness 1/5. VSS on RA. NPO except for meds. Takes pills whole. Up to bedside commode with Ax2 lift. Reporting constipation, last BM several days ago per pt. Refused stool softeners overnight, willing to take in AM. PRN dilaudid given for back pain - effective, pt slept between cares. Sitter in place for safety, pt has history of ingesting small objects. NS infusing at 75ml/hr. Solumedrol infusion given this AM, plan for PLEX. Discharge pending.

## 2024-04-02 NOTE — PROGRESS NOTES
We received an order for bilateral PRAFOs.  I stopped by the patient room 1717 two separate times and she is away.      We will attempt to provide bilateral PRAFOs again tomorrow.    Dudley BURTON

## 2024-04-02 NOTE — CONSULTS
"CLINICAL NUTRITION SERVICES  -  ASSESSMENT NOTE    RECOMMENDATIONS FOR MD/PROVIDER TO ORDER:   Diet per MD   Future/Additional Recommendations:   Will order Ensure Max vanilla at 10am once diet advances   Malnutrition:   % Weight Loss:  Weight loss does not meet criteria for malnutrition (intentional)  % Intake:  Decreased intake does not meet criteria for malnutrition  Subcutaneous Fat Loss:  None observed  Muscle Loss:  None observed  Fluid Retention:  None noted    Malnutrition Diagnosis: Patient does not meet two of the above criteria necessary for diagnosing malnutrition     REASON FOR ASSESSMENT  Nevin Alvarado is a 33 year old female seen by Registered Dietitian for Provider Order - \"requesting assistance with ordering appropriate supplements\"    PMH of CIDP with neuropathy, bipolar disorder, borderline personality disorder, depression, anxiety, chronic pain and history of self-injurious behavior with numerous foreign body ingestion in the past. Presents with acute bilateral lower extremity paralysis, CIDP flare.    NUTRITION HISTORY    - Per H&P, \"Hx of 47 endoscopies in last 4 years for foreign body extractions ([place], mickie hair pins, etc)\" and \"Is on Wegovy, reports weight loss of 60 pounds recently. Was adamant to receive this medication while she was at [facility], given on 3/31/24.\"  - Had multiple visits with weight loss dietitian from 10/23 - 1/24.  - Spoke with patient at bedside. Patient had many questions regarding diet order this admit. She said when she was at [facility] recently she had MD put restrictions on gluten, dairy, and eggs d/t stomach upset. She said she was only able to order ~3 items on menu. She would rather have an unrestricted diet and to self select her items. Will make sure these are not allergies in meal ordering system. Patient also wondering if we had Ensure Max as that is what she has been recommended to drink by her care team. She said she follows an outpatient " "dietitian. Currently on Wegovy and pt wondered if she needs to have restricted meal diet (for example, bariatric diet order during admit) - advised to eat when full/hungry as Wegovy can suppress appetite and help regulate intakes.    CURRENT NUTRITION ORDERS  Diet Order: NPO     Current Intake/Tolerance:  N/A    NUTRITION FOCUSED PHYSICAL ASSESSMENT FOR DIAGNOSING MALNUTRITION)  Yes          Observed:    No nutrition-related physical findings observed    ANTHROPOMETRICS  Height: 5'2\"  Weight: 114.6 kg (252 lb 10.4 oz) as of 3/31/2024  BMI 46.1  Weight Status:  Obesity Grade III BMI >40  IBW: 50 kg  % IBW: 229%  Weight History: wt slightly trending down (however intentional as pt has been trying to lose weight)  Wt Readings from Last 15 Encounters:   03/31/24 114.6 kg (252 lb 10.4 oz)   03/29/24 112.9 kg (249 lb)   03/29/24 112.9 kg (249 lb)   03/28/24 113.2 kg (249 lb 9.6 oz)   03/03/24 117.5 kg (259 lb)   02/24/24 117.9 kg (260 lb)   02/08/24 117.9 kg (260 lb)   01/26/24 117.9 kg (260 lb)   01/23/24 117.9 kg (260 lb)   01/05/24 117.9 kg (260 lb)   12/13/23 124.3 kg (274 lb)   12/01/23 124.3 kg (274 lb)   11/21/23 124.3 kg (274 lb)   11/17/23 124.3 kg (274 lb)   11/17/23 124.3 kg (274 lb)     LABS  Labs reviewed    MEDICATIONS  Ferrous sulfate, vitamin D3, IVF @ 75 mL/hr    ASSESSED NUTRITION NEEDS PER APPROVED PRACTICE GUIDELINES:  Dosing Weight 66.1 kg (adjusted)  Estimated Energy Needs: 5565-4641 kcals (20-25 Kcal/Kg)  Justification: maintenance  Estimated Protein Needs: 79-99 grams protein (1.2-1.5 g pro/Kg)  Justification: preservation of lean body mass  Estimated Fluid Needs: 1 mL/kcal or per provider pending fluid status    NUTRITION DIAGNOSIS:  No nutrition diagnosis identified at this time    NUTRITION INTERVENTIONS  Recommendations / Nutrition Prescription  Diet per MD    Implementation  Nutrition education: discussed diet order (once diet advances) and answered all pt's questions regarding supplements, " see above discussion    Nutrition Goals  Diet advancement within 48 hours    MONITORING AND EVALUATION:  Progress towards goals will be monitored and evaluated per protocol and Practice Guidelines    Brionna Valladares RD, LD  Clinical Dietitian - [facility]

## 2024-04-02 NOTE — PRE-PROCEDURE
GENERAL PRE-PROCEDURE:   Procedure:  Large bore tunneled central venous catheter placement  Date/Time:  4/2/2024 10:54 AM    Written consent obtained?: Yes    Risks and benefits: Risks, benefits and alternatives were discussed    Consent given by:  Guardian  Patient states understanding of procedure being performed: Yes    Patient's understanding of procedure matches consent: Yes    Procedure consent matches procedure scheduled: Yes    Expected level of sedation:  Moderate  Appropriately NPO:  Yes  ASA Class:  2  Mallampati  :  Grade 2- soft palate, base of uvula, tonsillar pillars, and portion of posterior pharyngeal wall visible  Lungs:  Lungs clear with good breath sounds bilaterally  Heart:  Normal heart sounds and rate  History & Physical reviewed:  History and physical reviewed and no updates needed  Statement of review:  I have reviewed the lab findings, diagnostic data, medications, and the plan for sedation    Nish Smalls PA-C  Interventional Radiology  [phone] (IR)  *[phone] ([facility] Office)

## 2024-04-02 NOTE — IR NOTE
Interventional Radiology Intra-procedural Nursing Note    Patient Name: Nevin lAvarado  Medical Record Number: [id number]  Today's Date: April 2, 2024    Procedure: Tuneled Dialysis Catherter Placement with Moderate Sedation  Start time: 1536  End time: 1546  Report provided to: Nguyen MORALES in Dialysis    Note: Patient entered Interventional Radiology Suite number 1 via cart. Patient awake, alert and orientated. Assisted onto procedural table in supine position. Prepped and draped.  Dr. Aguilar in room. Time out and procedure started. Vital signs stable. Telemetry reading NSR.    Procedure well tolerated by patient without complications. Procedure end with debrief by Dr. Beauchamp.      Administered medication totals:  Lidocaine 1% 16 mL Intradermal  Heparin 4000 Units CVC  Versed 1.5 mg IVP  Fentanyl 50 mcg IVP    Last dose of sedation administered at 1538.

## 2024-04-02 NOTE — CONSULTS
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 the Augmentin.; HUT Comment: PN: facial swelling, left side. Also had cortisone injection the same day as she started the Augmentin.Noted in downtime recovery of chart.; HUT Reaction: Rash; HUT Reaction: Unknown; HUT Reaction  Type: Allergy; HUT Severity: Med; HUT Noted: [id number]  PN: facial swelling, left side. Also had cortisone injection the same day as she started the Augmentin.  Other reaction(s): *Unknown  PN: facial swelling, left side. Also had cortisone injection the same day as she started the Augmentin.  Noted in downtime recovery of chart.  PN: facial swelling, left side. Also had cortisone injection the same day as she started the Augmentin.  Other reaction(s): Facial swelling  Other reaction(s): Facial swelling    Hydrocodone Nausea and Vomiting and GI Disturbance     vomiting for days, PN: vomiting for days; HUT Comment: vomiting for days; HUT Reaction: Gastrointestinal; HUT Reaction: Nausea And Vomiting; HUT Reaction Type: Intolerance; HUT Severity: Med; HUT Noted: [id number]  vomiting for days    Other reaction(s): Rash    Hydrocodone-Acetaminophen Nausea and Vomiting and Rash     Update on 12/12  Pt says she can take tylenol just not the hydrocodone.   Other reaction(s): Rash      Influenza Vaccines Other (See Comments) and Nausea and Vomiting     HUT Reaction: Nausea And Vomiting; HUT Reaction Type: Intolerance; HUT Severity: Low; HUT Noted: [id number]    Latex Rash     HUT Reaction: Rash; HUT Reaction Type: Allergy; HUT Severity: Low; HUT Noted: [id number]  Other reaction(s): Rash      Oseltamivir Hives     med stopped, PN: med stopped  med stopped, PN: med stopped; HUT Comment: med stopped, PN: med stopped; HUT Reaction: Hives; HUT Reaction Type: Allergy; HUT Severity: Med; HUT Noted: [id number]    Penicillins Anaphylaxis     HUT Reaction: Anaphylaxis; HUT Reaction Type: Allergy; HUT Severity: High; HUT Noted: 20150904    Vancomycin Itching, Swelling and Rash     Other reaction(s): Redness  Flushed face, very itchy; HUT Comment: Flushed face, very itchy; HUT Reaction: Angioedema; HUT Reaction: Redness; HUT Severity: Med; HUT Noted: [id number]    facial    Blood-Group Specific Substance Other (See Comments)     Patient has an  anti-Cw and non-specific antibodies. Blood product orders may be delayed. Draw one red top and two purple top tubes for all type/screen/crossmatch orders.  Patient has anti-Cw, anti-K ([place]), Warm auto and nonspecific antibodies. Blood products may be delayed. Draw patient 24 hours prior to transfusion. Draw one red top and two purple top tubes for all type and screen orders.    Clavulanic Acid Angioedema    Fentanyl Itching    Haemophilus B Polysaccharide Vaccine Nausea and Vomiting    Naltrexone Other (See Comments)     Reaction(s): Vivid dreams.    Other Drug Allergy (See Comments)      See original file MRN [id number]. Files are marked for merge    Oxycodone Swelling    Adhesive Tape Rash     Silicone type  Silicone type    Other reaction(s): adhesive allergy  Other reaction(s): adhesive allergy  Silicone type    Other reaction(s): adhesive allergy      Band-Aid Anti-Itch      Other reaction(s): adhesive allergy    Cephalosporins Rash    Lamotrigine Rash     Possibly associated with Lamictal.   HUT Comment: Possibly associated with Lamictal. ; HUT Reaction: Rash; HUT Reaction Type: Allergy; HUT Severity: Low; HUT Noted: [id number]    No Clinical Screening - See Comments Rash and Other (See Comments)     Silicone type  Silicone type  See original file MRN [id number]. Files are marked for merge  History of swallowing sharp metallic objects. She should not be prescribed lancets due to posed risk of swallowing.        Social History   I have reviewed this patient's social history and updated it with pertinent information if needed. Nevin Enriquejay  reports that she has never smoked. She has never used smokeless tobacco. She reports that she does not drink alcohol and does not use drugs.    Family History   I have reviewed this patient's family history and updated it with pertinent information if needed.   Family History   Problem Relation Age of Onset    Diabetes Type 2  Maternal Grandmother     Diabetes Type 2   Paternal Grandmother     Breast Cancer Paternal Grandmother     Cerebrovascular Disease Father 53    Myocardial Infarction No family hx of     Coronary Artery Disease Early Onset No family hx of     Ovarian Cancer No family hx of     Colon Cancer No family hx of     Depression Mother     Anxiety Disorder Mother        Review of Systems   The 10 point Review of Systems is negative other than noted in the HPI.     Physical Exam   Temp: 98.1  F (36.7  C) Temp src: Oral BP: 113/73 Pulse: 84   Resp: 18 SpO2: 94 % O2 Device: None (Room air)    Vital Signs with Ranges  Temp:  [98  F (36.7  C)-98.4  F (36.9  C)] 98.1  F (36.7  C)  Pulse:  [74-84] 84  Resp:  [18] 18  BP: (113-135)/(73-81) 113/73  SpO2:  [94 %-98 %] 94 %  0 lbs 0 oz    GENERAL: awake, alert  HEENT:  Normocephalic. No gross abnormalities.  Pupils equal.  MMM.  Dentition is ok  MUSCULOSKELETAL: extremities nl - no gross deformities noted  SKIN: no suspicious lesions or rashes, dry to touch  NEURO:  weak in BLE  PSYCH: mood good, affect appropriate  LYMPH: No palpable ant/post cervical and supraclavicular adenopathy    Data   BMP  Recent Labs   Lab 04/02/24  1018 04/02/24  0518 03/31/24  0740 03/30/24  1504 03/29/24  1242     --  140 141 142   POTASSIUM 3.6  --  4.2 3.8 4.2   CHLORIDE 108*  --  107 107 107   KELBY 8.7  --  9.1 9.4 9.6   CO2 20*  --  25 22 26   BUN 12.6  --  15.5 11.5 8.7   CR 0.54  --  0.79 0.69 0.70   * 105* 102* 101* 119*     Phos@LABRCNTIPR(phos:4)  CBC)  Recent Labs   Lab 04/02/24  1018 03/31/24  0740 03/30/24  1504 03/29/24  1242   WBC 7.1 6.6 9.5 11.2*   HGB 13.1 13.4 13.9 14.4   HCT 39.4 40.7 42.5 42.8   MCV 91 93 94 90    298 220 294     Recent Labs   Lab 03/29/24  1242   AST 17   ALT 26   ALKPHOS 82   BILITOTAL 0.3

## 2024-04-02 NOTE — CONSULTS
"  Interventional Radiology - Pre-Procedure Note:  4/2/2024    Procedure Requested: Tunneled CVC for PLEX  Requested by: Dr Lovell    Brief HPI: Nevin Alvarado is a 33 year old female w h/o CIPD, ZOHRA, BPD, self-harm admitted 3/30 with new onset of bilateral LE weakness. Neurology has recommended PLEX and IR consulted for large-bore tunneled CVC placement by nephrology. Currently patient denies N/V/F/C.     IMAGING:  CXR 4/1/24:  \"IMPRESSION: New left PICC with distal tip in the distal SVC. This is in good position. No complications identified. Moderate thoracolumbar spinal curvature. No acute cardiopulmonary abnormalities.\"    NPO: YES since [place]  ANTICOAGULANTS: NONE  ANTIBIOTICS: Clindamycin 900mg IV once for prophylaxis    ALLERGIES  Allergies   Allergen Reactions    Amoxicillin-Pot Clavulanate Other (See Comments), Swelling and Rash     PN: facial swelling, left side. Also had cortisone injection the same day as she started the Augmentin.  Noted in downtime recovery of chart.    PN: facial swelling, left side. Also had cortisone injection the same day as she started the Augmentin.; HUT Comment: PN: facial swelling, left side. Also had cortisone injection the same day as she started the Augmentin.Noted in downtime recovery of chart.; HUT Reaction: Rash; HUT Reaction: Unknown; HUT Reaction Type: Allergy; HUT Severity: Med; HUT Noted: [id number]  PN: facial swelling, left side. Also had cortisone injection the same day as she started the Augmentin.  Other reaction(s): *Unknown  PN: facial swelling, left side. Also had cortisone injection the same day as she started the Augmentin.  Noted in downtime recovery of chart.  PN: facial swelling, left side. Also had cortisone injection the same day as she started the Augmentin.  Other reaction(s): Facial swelling  Other reaction(s): Facial swelling    Hydrocodone Nausea and Vomiting and GI Disturbance     vomiting for days, PN: vomiting for days; HUT Comment: vomiting " for days; HUT Reaction: Gastrointestinal; HUT Reaction: Nausea And Vomiting; HUT Reaction Type: Intolerance; HUT Severity: Med; HUT Noted: 20141211  vomiting for days    Other reaction(s): Rash    Hydrocodone-Acetaminophen Nausea and Vomiting and Rash     Update on 12/12  Pt says she can take tylenol just not the hydrocodone.   Other reaction(s): Rash      Influenza Vaccines Other (See Comments) and Nausea and Vomiting     HUT Reaction: Nausea And Vomiting; HUT Reaction Type: Intolerance; HUT Severity: Low; HUT Noted: [id number]    Latex Rash     HUT Reaction: Rash; HUT Reaction Type: Allergy; HUT Severity: Low; HUT Noted: [id number]  Other reaction(s): Rash      Oseltamivir Hives     med stopped, PN: med stopped  med stopped, PN: med stopped; HUT Comment: med stopped, PN: med stopped; HUT Reaction: Hives; HUT Reaction Type: Allergy; HUT Severity: Med; HUT Noted: 20170109    Penicillins Anaphylaxis     HUT Reaction: Anaphylaxis; HUT Reaction Type: Allergy; HUT Severity: High; HUT Noted: 20150904    Vancomycin Itching, Swelling and Rash     Other reaction(s): Redness  Flushed face, very itchy; HUT Comment: Flushed face, very itchy; HUT Reaction: Angioedema; HUT Reaction: Redness; HUT Severity: Med; HUT Noted: 20190626    facial    Blood-Group Specific Substance Other (See Comments)     Patient has an anti-Cw and non-specific antibodies. Blood product orders may be delayed. Draw one red top and two purple top tubes for all type/screen/crossmatch orders.  Patient has anti-Cw, anti-K ([place]), Warm auto and nonspecific antibodies. Blood products may be delayed. Draw patient 24 hours prior to transfusion. Draw one red top and two purple top tubes for all type and screen orders.    Clavulanic Acid Angioedema    Fentanyl Itching    Haemophilus B Polysaccharide Vaccine Nausea and Vomiting    Naltrexone Other (See Comments)     Reaction(s): Vivid dreams.    Other Drug Allergy (See Comments)      See original file MRN [id number].  Files are marked for merge    Oxycodone Swelling    Adhesive Tape Rash     Silicone type  Silicone type    Other reaction(s): adhesive allergy  Other reaction(s): adhesive allergy  Silicone type    Other reaction(s): adhesive allergy      Band-Aid Anti-Itch      Other reaction(s): adhesive allergy    Cephalosporins Rash    Lamotrigine Rash     Possibly associated with Lamictal.   HUT Comment: Possibly associated with Lamictal. ; HUT Reaction: Rash; HUT Reaction Type: Allergy; HUT Severity: Low; HUT Noted: [id number]    No Clinical Screening - See Comments Rash and Other (See Comments)     Silicone type  Silicone type  See original file MRN [id number]. Files are marked for merge  History of swallowing sharp metallic objects. She should not be prescribed lancets due to posed risk of swallowing.          LABS:  INR   Date Value Ref Range Status   01/15/2023 1.11 0.85 - 1.15 Final   11/07/2020 0.97 0.86 - 1.14 Final      Hemoglobin   Date Value Ref Range Status   04/02/2024 13.1 11.7 - 15.7 g/dL Final   03/13/2021 10.9 (L) 11.7 - 15.7 g/dL Final   ]  Platelet Count   Date Value Ref Range Status   04/02/2024 258 150 - 450 10e3/uL Final   03/13/2021 299 150 - 450 10e9/L Final     Creatinine   Date Value Ref Range Status   04/02/2024 0.54 0.51 - 0.95 mg/dL Final   03/13/2021 0.62 0.52 - 1.04 mg/dL Final     Potassium   Date Value Ref Range Status   04/02/2024 3.6 3.4 - 5.3 mmol/L Final   07/08/2023 4.1 3.5 - 5.0 mmol/L Final   03/13/2021 3.9 3.4 - 5.3 mmol/L Final         EXAM:  /73 (BP Location: Right arm)   Pulse 84   Temp 98.1  F (36.7  C) (Oral)   Resp 18   LMP 07/12/2023   SpO2 94%   General:  Stable.  In no acute distress.    Neuro:  A&O x 3. Moves all extremities equally.  Heart: RRR  Lungs: No increased work of breathing, CTA bilat      Pre-Sedation Code Status Assessment:  Code Status: Full Code intra procedure, per discussion with patient.         ASSESSMENT/PLAN:   Weakness  H/O CIDP    Procedure,  risks/benefits, details, alternatives, and sedation reviewed with patient and guarding, both verbalized understanding. All questions answered. OK to proceed with above radiology procedure.     Nish Smalls PA-C  Interventional Radiology  *[phone]      Total Time: 35 minutes

## 2024-04-02 NOTE — PROCEDURES
IINTERVENTIONAL RADIOLOGY    Procedure:  Tunneled Dialysis Catheter    Meds:  Versed 1.5 mg IV   Fentanyl 50 mcg IV   Ancef 2 gram IV    EBL:  Minimal    MD:  Quynh    Complications:  None    Contrast:  None    Findings:  19 cm Palindrome tunneled dialysis catheter placed via right IJ vein, tip at RA.    Plan:  OK to use dialysis catheter.

## 2024-04-02 NOTE — PROGRESS NOTES
04/02/24 1300   Appointment Info   Signing Clinician's Name / Credentials (PT) Janie Mack DPT   Living Environment   People in Home facility resident   Current Living Arrangements group home   Home Accessibility stairs to enter home;stairs within home   Number of Stairs, Main Entrance 2   Stair Railings, Main Entrance none   Number of Stairs, Within Home, Primary eight   Stair Railings, Within Home, Primary railings on both sides of stairs   Transportation Anticipated family or friend will provide   Living Environment Comments Lives in  with , 1 platform step + 1 step, 8 steps up to apartment both sides.   Self-Care   Usual Activity Tolerance moderate   Current Activity Tolerance fair   Regular Exercise No   Equipment Currently Used at Home walker, rolling   Fall history within last six months yes   Number of times patient has fallen within last six months 1   Activity/Exercise/Self-Care Comment Unable to walk a city block, owns walker and uses occasionally. gets some assist with laundry and cleaning, IND with dressing/showering.   General Information   Onset of Illness/Injury or Date of Surgery 04/01/24   Referring Physician Melida Cedillo MD   Patient/Family Therapy Goals Statement (PT) Get stronger   Pertinent History of Current Problem (include personal factors and/or comorbidities that impact the POC) Nevin Alvarado is a 33 year old female with complex PMHx which includes hx of CIDP with neuropathy, bipolar disorder, borderline personality disorder, depression, anxiety, chronic pain and history of self-injurious behavior with numerous foreign body ingestion in the past, morbid obesity and ZOHRA.  She was initially admitted to [facility] on 3/30/2024 for evaluation of acute lower extremity paresis.  Additional workup was pursued including MRI of lumbar spine and an LP.  She was seen by general neurology service who recommended treatment with plasma exchange.  She was  subsequently transferred to [facility] on 4/1/2024 to coordinate plasma exchange treatment.   Existing Precautions/Restrictions fall   General Observations B foot drop, orthotics consulted for hard PRAFO as soft is not adequate   Cognition   Affect/Mental Status (Cognition) WFL   Orientation Status (Cognition) oriented x 4   Follows Commands (Cognition) WFL   Pain Assessment   Patient Currently in Pain Yes, see Vital Sign flowsheet  (Mild back)   Integumentary/Edema   Integumentary/Edema no deficits were identifed   Posture    Posture Forward head position;Kyphosis   Range of Motion (ROM)   Range of Motion ROM deficits secondary to pain;ROM deficits secondary to weakness   ROM Comment Foot drop present bilaterally, mild contractures forming but able to fit into AFO with stretching   Strength (Manual Muscle Testing)   Strength (Manual Muscle Testing) Deficits observed during functional mobility   Strength Comments Able to wiggle toes, trace contraction for DF   Bed Mobility   Comment, (Bed Mobility) Renae with assist at legs   Transfers   Comment, (Transfers) ModA2 sarastedy sit<>Stand w/ B AFO   Gait/Stairs (Locomotion)   Comment, (Gait/Stairs) NT   Balance   Balance Comments SBA seated balance, modA2 standing in SS   Sensory Examination   Sensory Perception Comments N/T at baseline in hands, arms, feet   Clinical Impression   Criteria for Skilled Therapeutic Intervention Yes, treatment indicated   PT Diagnosis (PT) imparied gait   Influenced by the following impairments weakness, pain   Functional limitations due to impairments impaired mobiltiy   Clinical Presentation (PT Evaluation Complexity) evolving   Clinical Presentation Rationale clinical judgement   Clinical Decision Making (Complexity) moderate complexity   Planned Therapy Interventions (PT) balance training;bed mobility training;cryotherapy;gait training;home exercise program;ROM (range of motion);stair  training;strengthening;stretching;transfer training;neuromuscular re-education   Risk & Benefits of therapy have been explained evaluation/treatment results reviewed;care plan/treatment goals reviewed;risks/benefits reviewed;current/potential barriers reviewed;participants voiced agreement with care plan;participants included;patient   PT Total Evaluation Time   PT Eval, Moderate Complexity Minutes (21238) 10   Physical Therapy Goals   PT Frequency 6x/week   PT Predicted Duration/Target Date for Goal Attainment 04/16/24   PT Goals Bed Mobility;Transfers;Gait;Stairs   PT: Bed Mobility Independent;Supine to/from sit;Rolling;Bridging   PT: Transfers Independent;Sit to/from stand;Bed to/from chair;Assistive device   PT: Gait Independent;Assistive device;150 feet   PT: Stairs Independent;2 stairs;8 stairs;Rail on both sides   Interventions   Interventions Quick Adds Therapeutic Activity   Therapeutic Activity   Therapeutic Activities: dynamic activities to improve functional performance Minutes (05777) 40   Symptoms Noted During/After Treatment Fatigue;Increased pain   Treatment Detail/Skilled Intervention Cotx with OT d/t high complexity of pt case and potential limited tolerance for therapy. Pt bedside, agreeable for session, reports some mild back pain. HOB raised, OT facilitated sock donning while PT managed equipment. Renae supine<>sit to get EOB, seated balance SBA with Renae to scoot fully EOB. Pt tremulous with weakness. OT  facilitating hygiene task while PT donned B AFO. Demo'd sarastedy sequencing, provided step by step cues for foot placement. Sit<>Stand modA2 x 3 with 10 sec standing tolerance. Transferred to recliner. Time positioning pt in soft PRAFO, ice pack on back. Discussed PRAFO with RN to order harder foot bed.   PT Discharge Planning   PT Plan Standing tolerance in sarastedy with B AFO, trial LE exercises for supine   PT Discharge Recommendation (DC Rec) Transitional Care Facility   PT Rationale for  DC Rec Pt PLOF IND with intermittent use of FWW, currently modA2 in sarastedy with B JUAN DANIELO. Rec TCU to progress functional mobility pending medical stability.   PT Brief overview of current status ModA2 SS with NIA CUEVA, RN to use lift

## 2024-04-02 NOTE — PROGRESS NOTES
04/02/24 1600   Appointment Info   Signing Clinician's Name / Credentials (OT) Nguyen De La Rosa OTR/L   Rehab Comments (OT) Initial OT Eval   Living Environment   People in Home facility resident   Current Living Arrangements group home   Home Accessibility stairs to enter home;stairs within home   Number of Stairs, Main Entrance 2   Stair Railings, Main Entrance none   Number of Stairs, Within Home, Primary eight   Stair Railings, Within Home, Primary railings on both sides of stairs   Transportation Anticipated family or friend will provide   Living Environment Comments Lives in  with , 1 platform step + 1 step, 8 steps up to apartment both sides.. tub shower combo   Self-Care   Usual Activity Tolerance moderate   Current Activity Tolerance fair   Regular Exercise No   Equipment Currently Used at Home walker, rolling;shower chair   Fall history within last six months yes   Number of times patient has fallen within last six months 1   Activity/Exercise/Self-Care Comment mod I for ADL and mobility. has shower chair and walker. seated dressing and showering. has been weaned off the walker since December   Instrumental Activities of Daily Living (IADL)   IADL Comments pt report she is indp with medication mgmt. has a light assist for home making tasks, cleaning, meals, etc   General Information   Onset of Illness/Injury or Date of Surgery 04/02/24   Referring Physician Melida Cedillo MD   Patient/Family Therapy Goal Statement (OT) to get stronger and walking again   Additional Occupational Profile Info/Pertinent History of Current Problem Nevin Alvarado is a 33 year old female with complex PMHx which includes hx of CIDP with neuropathy, bipolar disorder, borderline personality disorder, depression, anxiety, chronic pain and history of self-injurious behavior with numerous foreign body ingestion in the past, morbid obesity and ZOHRA.  She was initially admitted to [facility] on 3/30/2024  for evaluation of acute lower extremity paresis.  Additional workup was pursued including MRI of lumbar spine and an LP.  She was seen by general neurology service who recommended treatment with plasma exchange.  She was subsequently transferred to [facility] on 4/1/2024 to coordinate plasma exchange treatment.   Existing Precautions/Restrictions fall   General Observations and Info agreeable to therapy eval   Cognitive Status Examination   Orientation Status orientation to person, place and time   Affect/Mental Status (Cognitive) WFL   Follows Commands WFL   Cognitive Status Comments significant hx for mental health disorders   Visual Perception   Visual Impairment/Limitations corrective lenses full-time   Sensory   Sensory Comments pt reports baseline neuopathy in B Feet and B hands. exacerbated at this time   Pain Assessment   Patient Currently in Pain Yes, see Vital Sign flowsheet   Posture   Posture forward head position;protracted shoulders;kyphosis   Range of Motion Comprehensive   General Range of Motion no range of motion deficits identified   Strength Comprehensive (MMT)   Comment, General Manual Muscle Testing (MMT) Assessment B UE 3+/5, tremor noted with muscle activation and isometric activity.  strength equal and weak B   Muscle Tone Assessment   Muscle Tone Comments tremor noted with activity and muscle activation   Coordination   Upper Extremity Coordination Left UE impaired;Right UE impaired   Gross Motor Coordination L/R   Fine Motor Coordination L/R   Functional Limitations Decreased speed;Fine motor ADL performance impaired;Impaired ability to perform bilateral tasks;Object transport impaired;Reach to targets impaired   Coordination Comments equal   Activities of Daily Living   BADL Assessment/Intervention lower body dressing;grooming;toileting;bathing;upper body dressing   Bathing Assessment/Intervention   [place] Level (Bathing) maximum assist (25% patient effort)    Comment, (Bathing) seated per clinical judgement   Upper Body Dressing Assessment/Training   [place] Level (Upper Body Dressing) moderate assist (50% patient effort)   Lower Body Dressing Assessment/Training   Comment, (Lower Body Dressing) long sitting, back pain with flexion   [place] Level (Lower Body Dressing) maximum assist (25% patient effort)   Grooming Assessment/Training   [place] Level (Grooming) contact guard assist   Toileting   [place] Level (Toileting) maximum assist (25% patient effort)   Clinical Impression   Criteria for Skilled Therapeutic Interventions Met (OT) Yes, treatment indicated   OT Diagnosis decreased function in ADL   OT Problem List-Impairments impacting ADL problems related to;activity tolerance impaired;balance;communication;coordination;mobility;motor control;muscle tone;sensation;strength;pain;range of motion (ROM)   Assessment of Occupational Performance 5 or more Performance Deficits   Identified Performance Deficits all ADL and IADL   Planned Therapy Interventions (OT) ADL retraining;IADL retraining;neuromuscular re-education;progressive activity/exercise;transfer training;strengthening;ROM;motor coordination training   Clinical Decision Making Complexity (OT) problem focused assessment/low complexity   Risk & Benefits of therapy have been explained care plan/treatment goals reviewed;risks/benefits reviewed;evaluation/treatment results reviewed;current/potential barriers reviewed;participants voiced agreement with care plan;participants included;patient   Clinical Impression Comments decreased function in ADL warrants skilled therapy   OT Total Evaluation Time   OT [place] Complexity Minutes (79550) 10   OT Goals   Therapy Frequency (OT) 6 times/week   OT Predicted Duration/Target Date for Goal Attainment 04/20/24   OT Goals Hygiene/Grooming;Upper Body Dressing;Lower Body Dressing;Toilet Transfer/Toileting;OT Goal 1   OT: Hygiene/Grooming  supervision/stand-by assist   OT: Upper Body Dressing Supervision/stand-by assist   OT: Lower Body Dressing Supervision/stand-by assist;including set-up/clothing retrieval;using adaptive equipment  (with AE)   OT: Toilet Transfer/Toileting Supervision/stand-by assist;toilet transfer;cleaning and garment management;using adaptive equipment  (commode)   OT: Goal 1 Pt will demo neuro re ed activities with indp seated in recliner   Interventions   Interventions Quick Adds Therapeutic Activity   Therapeutic Activities   Therapeutic Activity Minutes (26745) 40   Treatment Detail/Skilled Intervention cotx with PT d/t high complexity of pt case and potential limited tolerance for therapy. Pt bedside, agreeable for session, reports some mild back pain. HOB raisedpt attempts to don socks, needs A to do so. Renae supine<>sit to get EOB with VC and TC, seuqnecing cues and breahting technqiues, seated balance SBA with Renae to scoot fully EOB. Pt tremulous with weakness in B UE. pt completed UB groming, UB bathing and dressing with set up in seated, fatigued quickl, cues for pacing.  m. PT dep donned B AFO. Demo'd sarastedy sequencing, provided step by step cues for foot placement. Sit<>Stand modA2 x 3 with 10 sec standing tolerance. Transferred to recliner. all needs in reach, providew with yellow and pink sponge, basic ex patterns, verbalized understanding. alarm on, updated RN   OT Discharge Planning   OT Plan transfers to [facility] with SS. UB neuor re ed activity   OT Discharge Recommendation (DC Rec) Transitional Care Facility;Acute Rehab Center-Motivated patient will benefit from intensive, interdisciplinary therapy.  Anticipate will be able to tolerate 3 hours of therapy per day   OT Rationale for DC Rec at this time recommend TCU as anticipated lengthy course of rehab for patient and unclear level of support in her group home. will continue to monitor for progress to update to ARU. signficiant assist needed for ADL and  mobility   OT Brief overview of current status therapy Ax2 with SS. nursing pearl lift   Total Session Time   Timed Code Treatment Minutes 40   Total Session Time (sum of timed and untimed services) 50

## 2024-04-02 NOTE — CONSULTS
[facility]  Neurology Daily Note      Admission Date:3/30/2024   Date of service: 04/02/2024   Hospital Day: 2                                                   Assessment and Plan:   #. Weakness, CIDP   Hx of CIDP, worsening weakness with  MRI lumbar spine completed showing enlargement of cauda equina nerve roots and enhancement.  This is suggestive of CIDP and recommend treatment with IVIG but she did not have benefit in the past so lets plan for plasma exchange. LP showed albumin cytologic dissociation confirming this diagnosis.  Possible there is some functional component as well but patient does have evidence of inflammation to explain leg weakness.   -Start PLEX, recommend 5-7 sessions depending on recovery   -LP glucose 60, protein 104.9, WBC 1  - 1 g Solu-Medrol this morning   -GQ1B antibody pending   -Anxiety and depression: Recommend psychiatry consult.  Was seen yesterday by psychotherapy.     #. PT/OT/Speech  --continue evaluations  #.Nutrition     --Per speech therapy evaluation    I will not see patient daily, will see patient again on Thursday.      Interval History:   Patient arrived at the hospital late last night, she is doing well.  She did get a dose of steroid this morning.  Does not feel any different.  No new symptoms.  She does think her right hand has been weaker in comparison to what it has been before but states this was prior to admission.  No new numbness or tingling.         Medications:   Scheduled Meds:   cetirizine  10 mg Oral Daily    ferrous sulfate  325 mg Oral Daily with breakfast    montelukast  10 mg Oral QPM    norethindrone  5 mg Oral Daily    pantoprazole  40 mg Oral Daily    pregabalin  100 mg Oral QAM    pregabalin  200 mg Oral At Bedtime    psyllium  1 packet Oral Daily    sodium chloride (PF)  3 mL Intracatheter Q8H    vitamin D3  50 mcg Oral Daily     PRN Meds: acetaminophen, albuterol, benzonatate, calcium carbonate, clonazePAM, cyclobenzaprine, HYDROmorphone,  HYDROmorphone, lidocaine 4%, lidocaine (buffered or not buffered), naloxone **OR** naloxone **OR** naloxone **OR** naloxone, ondansetron **OR** ondansetron, polyethylene glycol, senna-docusate **OR** senna-docusate, sodium chloride (PF)        Physical Exam:   Vitals: Temp: 98.1  F (36.7  C) Temp src: Oral BP: 113/73 Pulse: 84   Resp: 18 SpO2: 94 % O2 Device: None (Room air)    Vital Signs with Ranges: Temp:  [98  F (36.7  C)-98.4  F (36.9  C)] 98.1  F (36.7  C)  Pulse:  [74-86] 84  Resp:  [18] 18  BP: (113-135)/(73-81) 113/73  SpO2:  [94 %-98 %] 94 %    General Appearance:  No acute distress  Neuro:           Mental Status: The patient is alert and oriented. Recent memory is normal. The person is attentive with normal concentration. Language is fluent. Speech is of normal vera and character. The speech is nondysarthric. Fund of knowledge is normal.  Cranial Nerves: Visual fields full to confrontation, no visual extinction. Pursuits without saccadic intrusions and full in all directions. No gaze deviation, dysconjugate gaze, or gaze palsy, no nystagmus, no ptosis. Facial sensation intact in all distributions of CN V. No facial asymmetry at rest or with activation on smile or eyebrow lift. Symmetric palate elevation, tongue midline with full lateral movements. No dysarthria observed.   Motor:  Muscle Strength: The strength was 5/5 in upper and 1/5 lower extremities bilaterally.  Sensory: The sensory examination is normal for light touch and temp  bilaterally and symmetrically.   Cerebellar: The cerebellar examination is normal to finger to nose test.  Extremities: No clubbing, no cyanosis, no edema       Data:   ROUTINE IP LABS (Last 3results)  CBC RESULTS:     Recent Labs   Lab Test 04/02/24  1018 03/31/24  0740 03/30/24  1504   WBC 7.1 6.6 9.5   RBC 4.31 4.39 4.50   HGB 13.1 13.4 13.9   HCT 39.4 40.7 42.5    298 220     Basic Metabolic Panel:  Recent Labs   Lab Test 04/02/24  0518 03/31/24  0740  03/30/24  1504 03/29/24  1242   NA  --  140 141 142   POTASSIUM  --  4.2 3.8 4.2   CHLORIDE  --  107 107 107   CO2  --  25 22 26   BUN  --  15.5 11.5 8.7   CR  --  0.79 0.69 0.70   * 102* 101* 119*   KELBY  --  9.1 9.4 9.6     Liver panel:  Recent Labs   Lab Test 03/29/24  1242 03/13/24  2048 12/13/23  1522 11/21/23  2338 11/05/23  2042   PROTTOTAL 7.2 7.5 7.2 6.8 6.3*   ALBUMIN 4.4 4.5 4.4 4.1 4.0   BILITOTAL 0.3 0.2 0.4 0.4 0.2   ALKPHOS 82 93 86 78 67   AST 17 19 25 17 14   ALT 26 34 27 13 23     Thyroid Panel:  Recent Labs   Lab Test 06/27/23  2252 02/11/22  0844 11/30/20  0823 10/07/20  0041 09/08/20  2215   TSH 4.47* 4.94* 3.19   < > 6.96*   T4 1.17 0.87  --   --  0.94    < > = values in this interval not displayed.      Vitamin B12:   Recent Labs   Lab Test 02/11/22  0844 03/21/18  0802   B12 203 279      Ammonia: No lab results found.         IMAGING:   Independent interpretation of the following studies by myself as part of today's encounter.    IMPRESSION:  1.  Cauda equina nerve roots appear mildly enlarged as well as the lower lumbar spine and sacral exiting nerve roots. Some of these nerve roots demonstrate thin nonnodular enhancement. Findings may reflect chronic inflammatory demyelinating   polyneuropathy, Charcot-Monique-Tooth, neurofibromatosis type I. Enhancement could suggest acute infectious inflammatory process superimposed on chronic findings.  2.  Transitional lumbosacral anatomy described above.  3.  No significant degenerative changes.  4.  No significant thecal sac stenosis. No significant neural foraminal stenosi    MRI T spine w/wo    IMPRESSION:  1.  No abnormal cord signal. No cord pathologic enhancement.  2.  Mild multilevel spondylosis.  3.  No significant thecal sac stenosis.    TIME     45 minutes Evaluation/management time     Nevin Gamble M.D.  Neurologist  [facility]  Office [phone]

## 2024-04-03 ENCOUNTER — APPOINTMENT (OUTPATIENT)
Dept: PHYSICAL THERAPY | Facility: CLINIC | Age: 34
DRG: 074 | End: 2024-04-03
Attending: HOSPITALIST
Payer: COMMERCIAL

## 2024-04-03 ENCOUNTER — APPOINTMENT (OUTPATIENT)
Dept: OCCUPATIONAL THERAPY | Facility: CLINIC | Age: 34
DRG: 074 | End: 2024-04-03
Attending: HOSPITALIST
Payer: COMMERCIAL

## 2024-04-03 LAB — HBV SURFACE AG SERPL QL IA: NONREACTIVE

## 2024-04-03 PROCEDURE — 97112 NEUROMUSCULAR REEDUCATION: CPT | Mod: GO

## 2024-04-03 PROCEDURE — 120N000001 HC R&B MED SURG/OB

## 2024-04-03 PROCEDURE — 99232 SBSQ HOSP IP/OBS MODERATE 35: CPT | Performed by: INTERNAL MEDICINE

## 2024-04-03 PROCEDURE — 97535 SELF CARE MNGMENT TRAINING: CPT | Mod: GO

## 2024-04-03 PROCEDURE — 250N000013 HC RX MED GY IP 250 OP 250 PS 637: Performed by: INTERNAL MEDICINE

## 2024-04-03 PROCEDURE — L4396 STATIC OR DYNAMI AFO PRE CST: HCPCS

## 2024-04-03 PROCEDURE — 97530 THERAPEUTIC ACTIVITIES: CPT | Mod: GP

## 2024-04-03 RX ADMIN — FERROUS SULFATE TAB 325 MG (65 MG ELEMENTAL FE) 325 MG: 325 (65 FE) TAB at 08:40

## 2024-04-03 RX ADMIN — PREGABALIN 200 MG: 100 CAPSULE ORAL at 21:34

## 2024-04-03 RX ADMIN — ACETAMINOPHEN 1000 MG: 500 TABLET, FILM COATED ORAL at 06:55

## 2024-04-03 RX ADMIN — ACETAMINOPHEN 1000 MG: 500 TABLET, FILM COATED ORAL at 16:30

## 2024-04-03 RX ADMIN — PSYLLIUM HUSK 1 PACKET: 3.4 POWDER ORAL at 08:39

## 2024-04-03 RX ADMIN — HYDROMORPHONE HYDROCHLORIDE 2 MG: 2 TABLET ORAL at 20:48

## 2024-04-03 RX ADMIN — HYDROMORPHONE HYDROCHLORIDE 2 MG: 2 TABLET ORAL at 08:40

## 2024-04-03 RX ADMIN — MONTELUKAST 10 MG: 10 TABLET, FILM COATED ORAL at 20:48

## 2024-04-03 RX ADMIN — CETIRIZINE HYDROCHLORIDE 10 MG: 10 TABLET, FILM COATED ORAL at 21:34

## 2024-04-03 RX ADMIN — Medication 50 MCG: at 08:40

## 2024-04-03 RX ADMIN — NORETHINDRONE ACETATE 5 MG: 5 TABLET ORAL at 08:39

## 2024-04-03 RX ADMIN — PANTOPRAZOLE SODIUM 40 MG: 40 TABLET, DELAYED RELEASE ORAL at 08:39

## 2024-04-03 RX ADMIN — CYCLOBENZAPRINE 10 MG: 10 TABLET, FILM COATED ORAL at 11:35

## 2024-04-03 RX ADMIN — PREGABALIN 100 MG: 100 CAPSULE ORAL at 08:39

## 2024-04-03 ASSESSMENT — ACTIVITIES OF DAILY LIVING (ADL)
ADLS_ACUITY_SCORE: 60
ADLS_ACUITY_SCORE: 58
ADLS_ACUITY_SCORE: 58
ADLS_ACUITY_SCORE: 60
ADLS_ACUITY_SCORE: 58
ADLS_ACUITY_SCORE: 60
ADLS_ACUITY_SCORE: 58
ADLS_ACUITY_SCORE: 58
ADLS_ACUITY_SCORE: 60

## 2024-04-03 NOTE — PLAN OF CARE
Reason for Admission: BLE weakness    Cognitive/Mentation: A/Ox 4  Neuros/CMS: Intact ex BLE weakness 1/5  VS: stable on RA.   Tele: NSR.  GI: Last BM 3/29. Continent.  : Continent with purewick  Pulmonary: LS clear.  Pain: 6-8/10 PRN Tylenol and Dilaudid given.     Drains/Lines: triple lumen PICC w NS @75ml/hr  Skin: bruises on bilatera upper arms  Activity: Assist x 2 with lift.  Diet: NPO with thin liquids for meds. Takes pills whole.     Therapies recs: PT/OT  Discharge: pensing    Aggression Stoplight Tool: green    End of shift summary:inform patient before doing any cares. Sitter in place for safety( has hx of ingesting small objects)

## 2024-04-03 NOTE — PROGRESS NOTES
[facility]    Hospitalist Progress Note    Assessment & Plan   Nevin Alvarado is a 33 year old female with complex PMHx which includes hx of CIDP with neuropathy, bipolar disorder, borderline personality disorder, depression, anxiety, chronic pain and history of self-injurious behavior with numerous foreign body ingestion in the past, morbid obesity and ZOHRA.  She was initially admitted to [facility] on 3/30/2024 for evaluation of acute lower extremity paresis.  Additional workup was pursued including MRI of lumbar spine and an LP.  She was seen by general neurology service who recommended treatment with plasma exchange.  She was subsequently transferred to [facility] on 4/1/2024 to coordinate plasma exchange treatment.    Acute bilateral lower extremity paralysis dt CIDP flare, s/p PLEX starting 4/2  Chronic bilateral LE neuropathy  *Has history of CIDP with peripheral neuropathy.  She underwent treatments with IVIG in the past.  *Initially presented to [facility] ED on 3/30/2024 for evaluation of new onset back pain and bilateral lower extremity weakness (mostly experiences numbness, so weakness was a new symptom for her).  She was unable to ambulate.  *In the ED, labs were generally unremarkable.  General neurology was consulted.  She underwent further evaluation with MRI of the thoracic and lumbar spines.  MRI of the thoracic spine was unremarkable.  MRI lumbar spine showed enlargement of the cauda equina nerve roots and enhancement which was suggestive of CIDP per neurology.  She will underwent a lumbar puncture which showed elevated protein and albumin cytologic dissociation which confirmed the diagnosis of CIDP.  She was given 1 g of IV Solu-Medrol and transferred to [facility] to initiate plasma exchange. PICC line was placed.  *Tunneled catheter placed per IR on 4/2 and patient underwent her first round of plasma exchange later that  "day.  -- ongoing management per neurology  -- cont PLEX per nephrology; plan is for treatment every other day, treatment #1 on 4/2, plan is for 5-7 treatments total  -- cont routine neurochecks and fall precautions  -- cont PTA Lyrica (100mg in AM and 200mg HS)  -- PT/OT    Anxiety  Depression  Bipolar disorder  Chronic pain   Hx suspected medication seeking behavior  Frequent medical noncompliance  Frequent utilization of healthcare system  *Complicated psychiatric history.  Patient has a guardian and noted ED treatment plan.  She stated to admitting provider that she may be able to come off her guardianship as \"she is doing really well\" and she was planning to move out of her group home and back to an apartment.  *Psychiatry was consulted while she was at [facility], she was seen by DEC counselor while in the ED.  [place] that she was medically cleared to discharge once medical issues had stabilized.  *Conts on Lyrica as above and Klonopin as needed.    Hx self injurious behavior w/foreign body ingestion  *Hx of 47 endoscopies in last 4 years for foreign body extractions ([place], mickie hair pins, etc)    Chronic abdominal pain  *Noted subacute/chronic issue. Seen at [facility] on 3/29/24 and had normal EGD then left AMA; path report negative for dysplasia, gastritis, or Helicobacter.  *Abx xray 3/31 was nl.   *Chronic and stable on PPI.     Morbid obesity  *Is on Wegovy, reports weight loss of 60 pounds recently. Was adamant to receive this medication while she was at [facility], given on 3/31/24.    ZOHRA  *Brought in home CPAP to use on 4/3.     FEN: IVFs dc'd on 4/3, lytes stable, regular diet  DVT Prophylaxis: PCDs  Code Status: Full Code    Clinically Significant Risk Factors                         # Severe Obesity: Estimated body mass index is 46.21 kg/m  as calculated from the following:    Height as of 3/31/24: 1.575 m (5' 2\").    Weight as of 3/31/24: 114.6 kg (252 lb 10.4 oz)., PRESENT ON ADMISSION       # " Financial/Environmental Concerns:           Disposition: Anticipate discharge in 7+ days pending completion of plasma exchange treatments and clinical improvement. PT/OT following to discern whether patient will need a rehab stay vs return to her group home.     Brionna Robertson DO    Medical Decision Making       Please see A&P for additional details of medical decision making.       Interval History   Chart reviewed. Seen this morning. Reports lots of pain from tunneled catheter site, using heat, Tylenol and dilaudid for pain which seems to be helping. No sob/cough, abd pain/n/v. Generally feeling a little better today. Asking to meet with SW about concerns for an upcoming doctors visit for surgery. Also asking if RT can help her set up her home CPAP.     -Data reviewed today: I reviewed all new labs and imaging results over the last 24 hours. I personally reviewed no images or EKG's today.    Physical Exam   Temp: 98  F (36.7  C) Temp src: Oral BP: 116/79 Pulse: 83   Resp: 18 SpO2: 94 % O2 Device: None (Room air)    Vitals:     Vital Signs with Ranges  Temp:  [98  F (36.7  C)-98.6  F (37  C)] 98  F (36.7  C)  Pulse:  [] 83  Resp:  [15-37] 18  BP: (109-167)/() 116/79  SpO2:  [91 %-97 %] 94 %  I/O last 3 completed shifts:  In: 16 [I.V.:16]  Out: 2200 [Urine:2200]    Constitutional: Resting comfortably, alert and conversing appropriately, NAD  Respiratory: CTAB, no wheeze, no increased work of breathing  Cardiovascular: HRRR, no MGR, no LE edema  GI: S, NT, ND, +BS  Skin/Integumen: warm/dry  Other:      Medications   Current Facility-Administered Medications   Medication Dose Route Frequency Provider Last Rate Last Admin     Current Facility-Administered Medications   Medication Dose Route Frequency Provider Last Rate Last Admin    cetirizine (zyrTEC) tablet 10 mg  10 mg Oral At Bedtime Brionna Robertson DO   10 mg at 04/02/24 2137    ferrous sulfate (FEROSUL) tablet 325 mg  325 mg Oral  Daily with breakfast Melida Cedillo MD   325 mg at 04/02/24 1038    sodium chloride 0.9% DIALYSIS Cath LOCK - BLUE Lumen  1.3-2.6 mL Intracatheter Once Nish Smalls PA-C        Followed by    heparin 1000 unit/mL DIALYSIS Cath LOCK - BLUE Lumen  3 mL Intracatheter Once Nish Smalls PA-C        sodium chloride 0.9% DIALYSIS Cath LOCK - RED Lumen  1.3-2.6 mL Intracatheter Once Nish Smalls PA-C        Followed by    heparin 1000 unit/mL DIALYSIS Cath LOCK - RED Lumen  3 mL Intracatheter Once Nish Smalls PA-C        montelukast (SINGULAIR) tablet 10 mg  10 mg Oral QPM Melida Cedillo MD   10 mg at 04/02/24 2031    norethindrone (AYGESTIN) tablet 5 mg  5 mg Oral Daily Melida Cedillo MD        pantoprazole (PROTONIX) EC tablet 40 mg  40 mg Oral Daily Melida Cedillo MD   40 mg at 04/02/24 0920    pregabalin (LYRICA) capsule 100 mg  100 mg Oral QAM Melida Cedillo MD   100 mg at 04/02/24 0920    pregabalin (LYRICA) capsule 200 mg  200 mg Oral At Bedtime Melida Cedillo MD   200 mg at 04/02/24 2137    psyllium (METAMUCIL/KONSYL) Packet 1 packet  1 packet Oral Daily Melida Cedillo MD   1 packet at 04/02/24 0916    sodium chloride (PF) 0.9% PF flush 3 mL  3 mL Intracatheter Q8H Melida Cedillo MD   10 mL at 04/02/24 1436    vitamin D3 (CHOLECALCIFEROL) tablet 50 mcg  50 mcg Oral Daily Melida Cedillo MD   50 mcg at 04/02/24 1041       Data   Recent Labs   Lab 04/02/24  1018 04/02/24  0518 03/31/24  0740 03/30/24  1504 03/29/24  1242   WBC 7.1  --  6.6 9.5 11.2*   HGB 13.1  --  13.4 13.9 14.4   MCV 91  --  93 94 90     --  298 220 294     --  140 141 142   POTASSIUM 3.6  --  4.2 3.8 4.2   CHLORIDE 108*  --  107 107 107   CO2 20*  --  25 22 26   BUN 12.6  --  15.5 11.5 8.7   CR 0.54  --  0.79 0.69 0.70   ANIONGAP 13  --  8 12 9   KELBY 8.7  --  9.1 9.4 9.6   * 105* 102* 101* 119*   ALBUMIN  --   --    --   --  4.4   PROTTOTAL  --   --   --   --  7.2   BILITOTAL  --   --   --   --  0.3   ALKPHOS  --   --   --   --  82   ALT  --   --   --   --  26   AST  --   --   --   --  17   LIPASE  --   --   --   --  43       Recent Results (from the past 24 hour(s))   IR CVC Tunnel Placement > 5 Yrs of Age    [facility] RADIOLOGY  LOCATION: [facility]  DATE: 4/2/2024    PROCEDURE: TUNNELED DIALYSIS CATHETER PLACEMENT:    INTERVENTIONAL RADIOLOGIST: Dejon Beauchamp MD    INDICATION: Renal failure    SEDATION: Versed 1.5 mg. Fentanyl 50 mcg. The procedure was performed  with administration intravenous conscious sedation with appropriate  preoperative, intraoperative, and postoperative evaluation. During the  time-out, immediately prior to administration of medications, the  patient was reassessed for adequacy to receive conscious sedation.  9 minutes of supervised face to face conscious sedation time was  provided by a radiology nurse under my direct supervision.    Air Kerma: 5.5 mGy  FLUOROSCOPIC TIME: 0.4 minutes   CONTRAST: None.    COMPLICATIONS: No immediate complications.    STERILE BARRIER TECHNIQUE: Maximum sterile barrier technique was used.  Cutaneous antisepsis was performed at the operative site with  application of 2% chlorhexidine and large sterile drape. Prior to the  procedure, the surgeon and assistant performed hand hygiene and wore  hat, mask, sterile gown, and sterile gloves during the entire  procedure.    PROCEDURE:   Ultrasound revealed a patent and compressible right internal jugular  vein and an ultrasound image was obtained and placed in the patient's  permanent medical record. The right chest and neck were prepped and  draped in the usual sterile fashion followed by local anesthesia with  1% Xylocaine. Using real-time ultrasound guidance, the right internal  jugular vein was accessed. A subcutaneous tunnel was created requiring  a second incision. Using this access and  under fluoroscopy, a 19 cm  palindrome dialysis catheter was advanced until the tip was at the  cavoatrial junction.    FINDINGS:   Ultrasound shows an anechoic and compressible jugular vein.      At the completion of the study, fluoroscopic image reveals the  tunneled dialysis catheter tip to be located at the cavoatrial  junction.      Impression    IMPRESSION:    Successful tunneled dialysis catheter placement.    SHILA BAR MD         SYSTEM ID:  [id number]

## 2024-04-03 NOTE — PROGRESS NOTES
Reason for Admission: lower extremity paralysis  Cognitive/Mentation: A/Ox 4  Neuros/CMS: Stable w/ BLE weakness  VS: VSS.   Tele: nsr.  GI: BS clear, , last BM 4/2. Continent.  : Continent.  Pulmonary: LS clear.  Pain: PRN tylenol and dilaudid given.   Drains/Lines: PICC line infusing 75ml/hr  Skin: scattered bruises  Activity: Assist x 2 with lift.  Diet: regular with thin liquids. Takes pills whole.   Therapies recs: TCU   Discharge: pending  Aggression Stoplight Tool: green  End of shift summary: sitter at bedside

## 2024-04-03 NOTE — PROGRESS NOTES
S: Pt. seen at [facility]. Female. Dr. Robertson. RX: B/L Hard PRAFO's. DX: Foot drop B/L.  O:A: Pt. is presently wearing B/L Pre[employer] boots. Today I F/D B/L [employer] PRAFO's with aluminum posterior strut. Donning doffing wear and care instructions given.   P: Pt. to be seen as needed.    Varinder FELDMAN, LO

## 2024-04-03 NOTE — PROVIDER NOTIFICATION
"MD Notification    Notified Person: MD    Notified Person Name: Dr. Ramos    Notification Date/Time: 0154    Notification Interaction: vorcera    Purpose of Notification: patient declined cpap    Orders Received: \"She is not hypoxic at this time; put her on continuous pulse oximetry and if she desaturates with sleep we can use oxygen at that time \"     Comments: patient placed on continuous monitor  "

## 2024-04-03 NOTE — CONSULTS
Care Management Initial Consult    General Information  Assessment completed with: Other (Legal Guardian), Judith Calvillo  Type of CM/SW Visit: Initial Assessment    Primary Care Provider verified and updated as needed: No   Readmission within the last 30 days: no previous admission in last 30 days      Reason for Consult: discharge planning  Advance Care Planning: Advance Care Planning Reviewed: present on chart          Communication Assessment  Patient's communication style:               Cognitive  Cognitive/Neuro/Behavioral: WDL  Level of Consciousness: alert  Arousal Level: opens eyes spontaneously  Orientation: oriented x 4  Mood/Behavior: calm, cooperative  Best Language: 0 - No aphasia  Speech: clear, logical    Living Environment:   People in home: facility resident     Current living Arrangements: group home      Able to return to prior arrangements:         Family/Social Support:  Care provided by: self  Provides care for: no one, unable/limited ability to care for self  Marital Status: Single             Description of Support System:           Current Resources:   Patient receiving home care services: No     Community Resources: Group Home  Equipment currently used at home: walker, rolling, shower chair  Supplies currently used at home:      Employment/Financial:  Employment Status: disabled        Financial Concerns: none           Does the patient's insurance plan have a 3 day qualifying hospital stay waiver?  No    Lifestyle & Psychosocial Needs:  Social Determinants of Health     Food Insecurity: Not on file   Depression: Not at risk (3/28/2024)    PHQ-2     PHQ-2 Score: 1   Housing Stability: Not on file   Tobacco Use: Low Risk  (3/29/2024)    Patient History     Smoking Tobacco Use: Never     Smokeless Tobacco Use: Never     Passive Exposure: Not on file   Financial Resource Strain: Not on file   Alcohol Use: Not on file   Transportation Needs: Not on file   Physical Activity: Not on file  "  Interpersonal Safety: Not on file   Stress: Not on file   Social Connections: Not on file       Functional Status:  Prior to admission patient needed assistance:   Dependent ADLs:: Independent  Dependent IADLs:: Transportation, Money Management, Meal Preparation  Assesssment of Functional Status: Not at  functional baseline    Mental Health Status:  Mental Health Status: Past Concern  Mental Health Management: Other (see comment) (resides in group home)    Chemical Dependency Status:                Values/Beliefs:  Spiritual, Cultural Beliefs, Druze Practices, Values that affect care:                 Additional Information:  CM consult for discharge planning.  Per chart review, patient with complex past medical history of CIDP with neuropathy, bipolar disorder, borderline personality disorder, MDD, CANDICE, chronic pain, history of self injures behavior with foreign bodies ingestion in the past, morbid obesity, obstructive sleep apnea, frequent ER visits who presented initially to [facility] ER for evaluation of bilateral lower extremity weakness. She was transferred to [facility] for CIDP flare up and is receiving PLEX treatments.  Spoke to her legal guardian Judith MARLOWade with [employer].  She confirmed that Nevin resides in a group home [facility].  She stated she has been doing well recently and is mostly independent caring for herself including managing her medications.  Judith's role is primarily to provide consents.  She will be involved if rehab placement will be needed.  PT and OT are currently recommending ARU vs TCU.  Judith provided GH contact Cherry Pickett [phone].  Awaiting call back from Cherry for more information regarding patient's baseline and GH information.    CM will continue to follow for discharge planning.     Addendum @ 1447:  Received a call from Cherry Pickett.  She stated Nevin has been \"100%\" independent in taking care of herself.  They primarily provide transportation and light " "housekeeping.  She stated the house is like a duplex.  Nevin is on the upper level and she has access to a kitchen area and is able to prepare her own meals.  She also manages her own medications.  There is a staff member onsite 24/7.  Her [facility]  is Guerda Corley with [employer] [phone].    Addendum @ 1532:  Spoke to patient about concerns about upcoming appointments.  She stated she is scheduled at  for a surgery in May and has several appointments prior to prepare for this surgery.  She would like help with contacting  that she will need to reschedule everything.  She stated the \"supervisor\" Johanny would be the best contact and her number is [phone].  She has already left a message for Johanny.  Her surgeon is Dr. Mohsen Demarco.  She also requested the [facility] reach [facility] (Guerda Corley [phone] email [email or web address]). She stated email is best.  She would also like  to reach out to her mental health CM Hollie De La Rosa [phone].    Left VM for Johanny with  OB/GYN to return call.    Lola Elise RN, BSN, PHN  Inpatient Care Coordination  [facility]  Phone: [phone]            "

## 2024-04-03 NOTE — PLAN OF CARE
Reason for Admission: lower extremity paralysis, CIDP    Cognitive/Mentation: A/Ox 4  Neuros/CMS: Intact ex BLE weakness 1/5  VS: stable on RA.   GI: Last BM 4/3/24. Continent.  : Continent.  Pulmonary: LS clear.  Pain: 6/10, PRN Tylenol, Flexeril given.     Drains/Lines:  triple lumen PICC-SL, tunneled catheter R IJ  Skin: scattered bruises  Activity: Assist x 2 with mabel steady with foot braces on  Diet: regular with thin liquids. Takes pills whole.     Therapies recs: PT,OT  Discharge: TCU    Aggression Stoplight Tool: green    End of shift summary: inform patient before doing any cares. Sitter in place for safety( has hx of ingesting small objects)

## 2024-04-04 ENCOUNTER — APPOINTMENT (OUTPATIENT)
Dept: PHYSICAL THERAPY | Facility: CLINIC | Age: 34
DRG: 074 | End: 2024-04-04
Attending: HOSPITALIST
Payer: COMMERCIAL

## 2024-04-04 LAB
ALBUMIN SERPL BCG-MCNC: 3.5 G/DL (ref 3.5–5.2)
ANION GAP SERPL CALCULATED.3IONS-SCNC: 9 MMOL/L (ref 7–15)
APTT PPP: 29 SECONDS (ref 22–38)
BUN SERPL-MCNC: 16.5 MG/DL (ref 6–20)
CALCIUM SERPL-MCNC: 8.3 MG/DL (ref 8.6–10)
CHLORIDE SERPL-SCNC: 109 MMOL/L (ref 98–107)
CREAT SERPL-MCNC: 0.62 MG/DL (ref 0.51–0.95)
DEPRECATED HCO3 PLAS-SCNC: 24 MMOL/L (ref 22–29)
EGFRCR SERPLBLD CKD-EPI 2021: >90 ML/MIN/1.73M2
ERYTHROCYTE [DISTWIDTH] IN BLOOD BY AUTOMATED COUNT: 13 % (ref 10–15)
FIBRINOGEN PPP-MCNC: 153 MG/DL (ref 170–490)
GD1B GANGL IGG SER IA-ACNC: 3 IV
GD1B GANGL IGM SER IA-ACNC: 5 IV
GLUCOSE SERPL-MCNC: 117 MG/DL (ref 70–99)
GM1 GANGL IGG SER IA-ACNC: 3 IV
GM1 GANGL IGM SER IA-ACNC: 9 IV
GQ1B GANGL IGG SER IA-ACNC: 3 IV
GQ1B GANGL IGM SER IA-ACNC: 3 IV
HCT VFR BLD AUTO: 34.6 % (ref 35–47)
HGB BLD-MCNC: 11.6 G/DL (ref 11.7–15.7)
INR PPP: 1.34 (ref 0.85–1.15)
MCH RBC QN AUTO: 30 PG (ref 26.5–33)
MCHC RBC AUTO-ENTMCNC: 33.5 G/DL (ref 31.5–36.5)
MCV RBC AUTO: 89 FL (ref 78–100)
PHOSPHATE SERPL-MCNC: 2.7 MG/DL (ref 2.5–4.5)
PLATELET # BLD AUTO: 161 10E3/UL (ref 150–450)
POTASSIUM SERPL-SCNC: 3.8 MMOL/L (ref 3.4–5.3)
RBC # BLD AUTO: 3.87 10E6/UL (ref 3.8–5.2)
SODIUM SERPL-SCNC: 142 MMOL/L (ref 135–145)
WBC # BLD AUTO: 14.1 10E3/UL (ref 4–11)

## 2024-04-04 PROCEDURE — 6A550Z3 PHERESIS OF PLASMA, SINGLE: ICD-10-PCS | Performed by: NURSE PRACTITIONER

## 2024-04-04 PROCEDURE — 85027 COMPLETE CBC AUTOMATED: CPT | Performed by: INTERNAL MEDICINE

## 2024-04-04 PROCEDURE — 99232 SBSQ HOSP IP/OBS MODERATE 35: CPT | Performed by: INTERNAL MEDICINE

## 2024-04-04 PROCEDURE — P9045 ALBUMIN (HUMAN), 5%, 250 ML: HCPCS | Mod: JZ | Performed by: INTERNAL MEDICINE

## 2024-04-04 PROCEDURE — 97530 THERAPEUTIC ACTIVITIES: CPT | Mod: GP | Performed by: PHYSICAL THERAPIST

## 2024-04-04 PROCEDURE — 85610 PROTHROMBIN TIME: CPT | Performed by: INTERNAL MEDICINE

## 2024-04-04 PROCEDURE — 85730 THROMBOPLASTIN TIME PARTIAL: CPT | Performed by: INTERNAL MEDICINE

## 2024-04-04 PROCEDURE — 85384 FIBRINOGEN ACTIVITY: CPT | Performed by: INTERNAL MEDICINE

## 2024-04-04 PROCEDURE — 250N000011 HC RX IP 250 OP 636: Mod: JZ | Performed by: INTERNAL MEDICINE

## 2024-04-04 PROCEDURE — 258N000003 HC RX IP 258 OP 636: Performed by: INTERNAL MEDICINE

## 2024-04-04 PROCEDURE — 250N000009 HC RX 250: Performed by: INTERNAL MEDICINE

## 2024-04-04 PROCEDURE — 80069 RENAL FUNCTION PANEL: CPT | Performed by: INTERNAL MEDICINE

## 2024-04-04 PROCEDURE — 120N000001 HC R&B MED SURG/OB

## 2024-04-04 PROCEDURE — 250N000013 HC RX MED GY IP 250 OP 250 PS 637: Performed by: INTERNAL MEDICINE

## 2024-04-04 RX ORDER — ALBUMIN HUMAN 25 %
3000 INTRAVENOUS SOLUTION INTRAVENOUS ONCE
Status: DISCONTINUED | OUTPATIENT
Start: 2024-04-04 | End: 2024-04-04

## 2024-04-04 RX ORDER — HEPARIN SODIUM 1000 [USP'U]/ML
2-6 INJECTION, SOLUTION INTRAVENOUS; SUBCUTANEOUS
Status: COMPLETED | OUTPATIENT
Start: 2024-04-04 | End: 2024-04-15

## 2024-04-04 RX ORDER — ALBUMIN HUMAN 25 %
3250 INTRAVENOUS SOLUTION INTRAVENOUS ONCE
Status: COMPLETED | OUTPATIENT
Start: 2024-04-04 | End: 2024-04-04

## 2024-04-04 RX ORDER — CETIRIZINE HYDROCHLORIDE 10 MG/1
10 TABLET ORAL AT BEDTIME
Status: DISCONTINUED | OUTPATIENT
Start: 2024-04-04 | End: 2024-04-16 | Stop reason: HOSPADM

## 2024-04-04 RX ORDER — PREGABALIN 100 MG/1
200 CAPSULE ORAL AT BEDTIME
Status: DISCONTINUED | OUTPATIENT
Start: 2024-04-04 | End: 2024-04-16 | Stop reason: HOSPADM

## 2024-04-04 RX ADMIN — ACETAMINOPHEN 1000 MG: 500 TABLET, FILM COATED ORAL at 16:26

## 2024-04-04 RX ADMIN — NORETHINDRONE ACETATE 5 MG: 5 TABLET ORAL at 08:49

## 2024-04-04 RX ADMIN — PSYLLIUM HUSK 1 PACKET: 3.4 POWDER ORAL at 08:49

## 2024-04-04 RX ADMIN — HYDROMORPHONE HYDROCHLORIDE 2 MG: 2 TABLET ORAL at 18:16

## 2024-04-04 RX ADMIN — PREGABALIN 200 MG: 100 CAPSULE ORAL at 20:19

## 2024-04-04 RX ADMIN — HYDROMORPHONE HYDROCHLORIDE 2 MG: 2 TABLET ORAL at 10:10

## 2024-04-04 RX ADMIN — Medication 50 MCG: at 08:49

## 2024-04-04 RX ADMIN — PANTOPRAZOLE SODIUM 40 MG: 40 TABLET, DELAYED RELEASE ORAL at 08:49

## 2024-04-04 RX ADMIN — ALBUMIN HUMAN 3250 ML: 0.05 INJECTION, SOLUTION INTRAVENOUS at 10:49

## 2024-04-04 RX ADMIN — MONTELUKAST 10 MG: 10 TABLET, FILM COATED ORAL at 20:19

## 2024-04-04 RX ADMIN — FERROUS SULFATE TAB 325 MG (65 MG ELEMENTAL FE) 325 MG: 325 (65 FE) TAB at 08:49

## 2024-04-04 RX ADMIN — ACETAMINOPHEN 1000 MG: 500 TABLET, FILM COATED ORAL at 06:27

## 2024-04-04 RX ADMIN — ANTICOAGULANT CITRATE DEXTROSE SOLUTION FORMULA A 1000 ML: 12.25; 11; 3.65 SOLUTION INTRAVENOUS at 10:50

## 2024-04-04 RX ADMIN — CALCIUM GLUCONATE 4 G: 98 INJECTION, SOLUTION INTRAVENOUS at 10:49

## 2024-04-04 RX ADMIN — PREGABALIN 100 MG: 100 CAPSULE ORAL at 08:49

## 2024-04-04 RX ADMIN — CETIRIZINE HYDROCHLORIDE 10 MG: 10 TABLET, FILM COATED ORAL at 20:19

## 2024-04-04 ASSESSMENT — ACTIVITIES OF DAILY LIVING (ADL)
ADLS_ACUITY_SCORE: 58
ADLS_ACUITY_SCORE: 60
ADLS_ACUITY_SCORE: 50
ADLS_ACUITY_SCORE: 63
ADLS_ACUITY_SCORE: 58
ADLS_ACUITY_SCORE: 60
ADLS_ACUITY_SCORE: 50
ADLS_ACUITY_SCORE: 62
ADLS_ACUITY_SCORE: 50
ADLS_ACUITY_SCORE: 59
ADLS_ACUITY_SCORE: 60
ADLS_ACUITY_SCORE: 60
ADLS_ACUITY_SCORE: 63
ADLS_ACUITY_SCORE: 50
ADLS_ACUITY_SCORE: 60
ADLS_ACUITY_SCORE: 60
ADLS_ACUITY_SCORE: 59
ADLS_ACUITY_SCORE: 60
ADLS_ACUITY_SCORE: 58
ADLS_ACUITY_SCORE: 63
ADLS_ACUITY_SCORE: 59

## 2024-04-04 NOTE — PROGRESS NOTES
Care Management Follow Up    Length of Stay (days): 3    Expected Discharge Date: 04/14/2024     Concerns to be Addressed:  eventual discharge plan      Patient plan of care discussed at interdisciplinary rounds: Yes    Anticipated Discharge Disposition:  undetermined at this time, current recommendation ARU vs TCU. Patient has several more days in hospital for PLEX.     Referrals Placed by CM/ALLAN: External Care Coordination  Private pay costs discussed: Not applicable    Additional Information:  Patient requested that SW contact her CADI  and Targeted . SW emailed CADI IJEOMA Croft and left voicemail for Hollie MILLER.     Jammie Avila, MSW, LGSW   Social Work   [facility]

## 2024-04-04 NOTE — PROGRESS NOTES
Treatment in room D40 using Optia apheresis machine. Consent verified.     Height: 5 ft 2 in  Weight: 114.6 kg  HCT: 35%  Inlet speed: 75  ACDA ratio: 10:1  Fluid balance: 100  Access: right CVC.     Replacement product: 5% Albumin 3500 mL.  Electrolyte replacement: Ca Gluconate 4 grams in NS - total 150 mls, piggybacked into return lines, infused over time of treatment.    Premedications: none     Treatment Notes: pt tolerated tx well. No complications. Dr. Lovell saw pt during tx.      Treatment completed as ordered, VSS, see EPIC flowsheet for run data.     Next treatment: Saturday 4/6/2024    Valentina Soares Apheresis  RN

## 2024-04-04 NOTE — PLAN OF CARE
Reason for Admission: lower extremity paralysis, CIDP     Cognitive/Mentation: A/Ox 4  Neuros/CMS: Intact ex BLE weakness 1/5  VS: stable on RA.   GI: Last BM 4/3/24. Continent.  : Continent.  Pulmonary: LS clear.  Pain: 6/10, PRN Dilaudid and Tylenol     Drains/Lines:  triple lumen PICC-SL, tunneled catheter R IJ  Skin: scattered bruises  Activity: Assist x 2 with mabel steady with foot braces on  Diet: regular with thin liquids. Takes pills whole.      Therapies recs: PT,OT  Discharge: ARU vs TCU     Aggression Stoplight Tool: green     End of shift summary: inform patient before doing any cares. Sitter in place for safety      (has hx of ingesting small objects). PLEX on Saturday 4/6/24.

## 2024-04-04 NOTE — PROGRESS NOTES
Reason for Admission: lower extremity paralysis  Cognitive/Mentation: A/Ox 4  Neuros/CMS: Stable w/ BLE weakness  VS: VSS.   GI: BS clear, . Continent.  : Continent. Purewick throughout the night.   Pulmonary: LS clear.  Pain: PRN tylenol given.   Drains/Lines: PICC SL  Skin: scattered bruises  Activity: Assist x 2 with lift.  Diet: regular with thin liquids. Takes pills whole.   Therapies recs: TCU   Discharge: pending  Aggression Stoplight Tool: green  End of shift summary: sitter at bedside, braces on throughout the night, labs drawn, PLEX today.

## 2024-04-04 NOTE — PROGRESS NOTES
Apheresis #2    Dx:  CIDP    1.0 plasma volume today.    Albumin replacement of 3250 mL  Calcium 4 g    Run stable.    .Barney Lovell MD

## 2024-04-04 NOTE — PROGRESS NOTES
[facility]  Neurology Daily Note      Admission Date:3/30/2024   Date of service: 04/04/2024   Hospital Day: 4                                                   Assessment and Plan:   #. Weakness, CIDP   Hx of CIDP, worsening weakness with  MRI lumbar spine completed showing enlargement of cauda equina nerve roots and enhancement.  This is suggestive of CIDP and recommend treatment with IVIG but she did not have benefit in the past so we opted for plasma exchange. LP showed albumin cytologic dissociation confirming this diagnosis.  Possible there is some functional component as well but patient does have evidence of inflammation to explain leg weakness.   -Start PLEX, recommend 5-7 sessions depending on recovery   -LP glucose 60, protein 104.9, WBC 1  - 1 g Solu-Medrol  on 4/2  -GQ1B antibody negative  -Anxiety and depression: Recommend psychiatry consult.  Was seen yesterday by psychotherapy.     #. PT/OT/Speech  --continue evaluations  #.Nutrition     --Per speech therapy evaluation    Dr. Zavala will be coming on over the weekend and will follow-up with the patient.         Interval History:   Patient reports that she is stable and may have some improvement in her leg strength.  She states that PT is also thought she has had some improvement.  No new weakness or numbness in her arms or hands.  She still feels her right hand at times can be weak when she uses a walker.         Medications:   Scheduled Meds:  Current Facility-Administered Medications   Medication Dose Route Frequency Provider Last Rate Last Admin    cetirizine (zyrTEC) tablet 10 mg  10 mg Oral At Bedtime Brionna Robertson DO   10 mg at 04/03/24 2134    ferrous sulfate (FEROSUL) tablet 325 mg  325 mg Oral Daily with breakfast Melida Cedillo MD   325 mg at 04/03/24 0840    sodium chloride 0.9% DIALYSIS Cath LOCK - BLUE Lumen  1.3-2.6 mL Intracatheter Once Nish Smalls PA-C        Followed by    heparin 1000  unit/mL DIALYSIS Cath LOCK - BLUE Lumen  3 mL Intracatheter Once Nish Smalls PA-C        sodium chloride 0.9% DIALYSIS Cath LOCK - RED Lumen  1.3-2.6 mL Intracatheter Once Nish Smalls PA-C        Followed by    heparin 1000 unit/mL DIALYSIS Cath LOCK - RED Lumen  3 mL Intracatheter Once Nish Smalls PA-C        montelukast (SINGULAIR) tablet 10 mg  10 mg Oral QPM Melida Cedillo MD   10 mg at 04/03/24 2048    norethindrone (AYGESTIN) tablet 5 mg  5 mg Oral Daily Melida Cedillo MD   5 mg at 04/03/24 0839    pantoprazole (PROTONIX) EC tablet 40 mg  40 mg Oral Daily Melida Cedillo MD   40 mg at 04/03/24 0839    pregabalin (LYRICA) capsule 100 mg  100 mg Oral QAM Melida Cedillo MD   100 mg at 04/03/24 0839    pregabalin (LYRICA) capsule 200 mg  200 mg Oral At Bedtime Melida Cedillo MD   200 mg at 04/03/24 2134    psyllium (METAMUCIL/KONSYL) Packet 1 packet  1 packet Oral Daily Melida Cedillo MD   1 packet at 04/03/24 0839    sodium chloride (PF) 0.9% PF flush 3 mL  3 mL Intracatheter Q8H Melida Cedillo MD   3 mL at 04/04/24 0637    vitamin D3 (CHOLECALCIFEROL) tablet 50 mcg  50 mcg Oral Daily Melida Cedillo MD   50 mcg at 04/03/24 0840     PRN Meds:   Current Facility-Administered Medications   Medication Dose Route Frequency Provider Last Rate Last Admin    acetaminophen (TYLENOL) tablet 1,000 mg  1,000 mg Oral Q6H PRN Melida Cedillo MD   1,000 mg at 04/04/24 0627    albuterol (PROVENTIL HFA/VENTOLIN HFA) inhaler  2 puff Inhalation Q6H PRN Melida Cedillo MD        benzonatate (TESSALON) capsule 100 mg  100 mg Oral TID PRN Melida Cedillo MD        calcium carbonate (TUMS) chewable tablet 1,000 mg  1,000 mg Oral 4x Daily PRN Melida Cedillo MD        clonazePAM (klonoPIN) tablet 0.5 mg  0.5 mg Oral Daily PRN Melida Cedillo MD        cyclobenzaprine (FLEXERIL) tablet 10 mg  10 mg Oral TID PRN  Melida Cedillo MD   10 mg at 04/03/24 1135    HYDROmorphone (DILAUDID) tablet 2 mg  2 mg Oral Q4H PRN Melida Cedillo MD   2 mg at 04/03/24 2048    HYDROmorphone (PF) (DILAUDID) injection 0.3 mg  0.3 mg Intravenous Q6H PRN Melida Cedillo MD        lidocaine (LMX4) cream   Topical Q1H PRN Melida Cedillo MD        lidocaine 1 % 0.1-1 mL  0.1-1 mL Other Q1H PRN Melida Cedillo MD        naloxone (NARCAN) injection 0.2 mg  0.2 mg Intravenous Q2 Min PRN Andrew Cunningham MD        Or    naloxone (NARCAN) injection 0.4 mg  0.4 mg Intravenous Q2 Min PRN Andrew Cunningham MD        Or    naloxone (NARCAN) injection 0.2 mg  0.2 mg Intramuscular Q2 Min PRN Andrew Cunningham MD        Or    naloxone (NARCAN) injection 0.4 mg  0.4 mg Intramuscular Q2 Min PRN Andrew Cunningham MD        ondansetron (ZOFRAN ODT) ODT tab 4 mg  4 mg Oral Q6H PRN Melida Cedillo MD        Or    ondansetron (ZOFRAN) injection 4 mg  4 mg Intravenous Q6H PRN Melida Cedillo MD        polyethylene glycol (MIRALAX) powder 17 g  17 g Oral Daily PRN Melida Cedillo MD        senna-docusate (SENOKOT-S/PERICOLACE) 8.6-50 MG per tablet 1 tablet  1 tablet Oral BID PRN Melida Cedillo MD        Or    senna-docusate (SENOKOT-S/PERICOLACE) 8.6-50 MG per tablet 2 tablet  2 tablet Oral BID PRN Melida Cedillo MD        sodium chloride (PF) 0.9% PF flush 3 mL  3 mL Intracatheter q1 min prn Melida Cedillo MD   3 mL at 04/02/24 0523           Physical Exam:   Vitals: Temp: 98.1  F (36.7  C) Temp src: Oral BP: 95/53 Pulse: 67   Resp: 18 SpO2: 99 % O2 Device: None (Room air)    Vital Signs with Ranges: Temp:  [97.7  F (36.5  C)-98.1  F (36.7  C)] 98.1  F (36.7  C)  Pulse:  [67-91] 67  Resp:  [16-18] 18  BP: ()/(53-67) 95/53  SpO2:  [97 %-99 %] 99 %    General Appearance:  No acute distress  Neuro:           Mental Status: The patient is alert and oriented. Recent memory is normal. The person is  attentive with normal concentration. Language is fluent. Speech is of normal vera and character. The speech is nondysarthric. Fund of knowledge is normal.  Motor:  Muscle Strength: The strength was 5/5 in upper and 1/5 lower extremities bilaterally.  She is more briskly responsive in her legs.  Reflexes: Intact in the upper extremities and diminished in the lower extremities.  Sensory: The sensory examination is normal for light touch and temp  bilaterally and symmetrically.  Absent vibration in the toes and decreased in the ankles.    Extremities: No clubbing, no cyanosis, no edema       Data:   ROUTINE IP LABS (Last 3results)  CBC RESULTS:     Recent Labs   Lab Test 04/04/24  0010 04/02/24  1018 03/31/24  0740   WBC 14.1* 7.1 6.6   RBC 3.87 4.31 4.39   HGB 11.6* 13.1 13.4   HCT 34.6* 39.4 40.7    258 298     Basic Metabolic Panel:  Recent Labs   Lab Test 04/04/24  0557 04/02/24  1018 04/02/24  0518 03/31/24  0740    141  --  140   POTASSIUM 3.8 3.6  --  4.2   CHLORIDE 109* 108*  --  107   CO2 24 20*  --  25   BUN 16.5 12.6  --  15.5   CR 0.62 0.54  --  0.79   * 151* 105* 102*   KELBY 8.3* 8.7  --  9.1     Liver panel:  Recent Labs   Lab Test 04/04/24  0557 03/29/24  1242 03/13/24  2048 12/13/23  1522 11/21/23  2338 11/05/23  2042   PROTTOTAL  --  7.2 7.5 7.2 6.8 6.3*   ALBUMIN 3.5 4.4 4.5 4.4 4.1 4.0   BILITOTAL  --  0.3 0.2 0.4 0.4 0.2   ALKPHOS  --  82 93 86 78 67   AST  --  17 19 25 17 14   ALT  --  26 34 27 13 23     Thyroid Panel:  Recent Labs   Lab Test 06/27/23  2252 02/11/22  0844 11/30/20  0823 10/07/20  0041 09/08/20  2215   TSH 4.47* 4.94* 3.19   < > 6.96*   T4 1.17 0.87  --   --  0.94    < > = values in this interval not displayed.      Vitamin B12:   Recent Labs   Lab Test 02/11/22  0844 03/21/18  0802   B12 203 279      Ammonia: No lab results found.         IMAGING:   Independent interpretation of the following studies by myself as part of today's encounter.    IMPRESSION:  1.   Cauda equina nerve roots appear mildly enlarged as well as the lower lumbar spine and sacral exiting nerve roots. Some of these nerve roots demonstrate thin nonnodular enhancement. Findings may reflect chronic inflammatory demyelinating   polyneuropathy, Charcot-Monique-Tooth, neurofibromatosis type I. Enhancement could suggest acute infectious inflammatory process superimposed on chronic findings.  2.  Transitional lumbosacral anatomy described above.  3.  No significant degenerative changes.  4.  No significant thecal sac stenosis. No significant neural foraminal stenosi    MRI T spine w/wo    IMPRESSION:  1.  No abnormal cord signal. No cord pathologic enhancement.  2.  Mild multilevel spondylosis.  3.  No significant thecal sac stenosis.    TIME     40  minutes Evaluation/management time     Nevin Gamble M.D.  Neurologist  [facility] Neurology  Office [phone]

## 2024-04-04 NOTE — PROGRESS NOTES
[facility]    Hospitalist Progress Note    Assessment & Plan   Nevin Alvarado is a 33 year old female with complex PMHx which includes hx of CIDP with neuropathy, bipolar disorder, borderline personality disorder, depression, anxiety, chronic pain and history of self-injurious behavior with numerous foreign body ingestion in the past, morbid obesity and ZOHRA.  She was initially admitted to [facility] on 3/30/2024 for evaluation of acute lower extremity paresis.  Additional workup was pursued including MRI of lumbar spine and an LP.  She was seen by general neurology service who recommended treatment with plasma exchange.  She was subsequently transferred to [facility] on 4/1/2024 to coordinate plasma exchange treatment.    Acute bilateral lower extremity paralysis dt CIDP flare, s/p PLEX starting 4/2  Chronic bilateral LE neuropathy  *Has history of CIDP with peripheral neuropathy.  She underwent treatments with IVIG in the past.  *Initially presented to [facility] ED on 3/30/2024 for evaluation of new onset back pain and bilateral lower extremity weakness (mostly experiences numbness, so weakness was a new symptom for her).  She was unable to ambulate.  *In the ED, labs were generally unremarkable.  General neurology was consulted.  She underwent further evaluation with MRI of the thoracic and lumbar spines.  MRI of the thoracic spine was unremarkable.  MRI lumbar spine showed enlargement of the cauda equina nerve roots and enhancement which was suggestive of CIDP per neurology.  She will underwent a lumbar puncture which showed elevated protein and albumin cytologic dissociation which confirmed the diagnosis of CIDP.  She was given 1 g of IV Solu-Medrol and transferred to [facility] to initiate plasma exchange. PICC line was placed.  *Tunneled catheter placed per IR on 4/2 and patient underwent her first round of plasma exchange later that  "day.  -- ongoing management per neurology  -- cont PLEX per nephrology; plan is for treatment every other day, treatment #2 on 4/4, plan is for 5-7 treatments total  -- cont routine neurochecks and fall precautions  -- cont PTA Lyrica (100mg in AM and 200mg HS)  -- PT/OT following, recommending TCU vs ARU    Anxiety  Depression  Bipolar disorder  Chronic pain   Hx suspected medication seeking behavior  Frequent medical noncompliance  Frequent utilization of healthcare system  *Complicated psychiatric history.  Patient has a guardian and noted ED treatment plan.  She stated to admitting provider that she may be able to come off her guardianship as \"she is doing really well\" and she was planning to move out of her group home and back to an apartment.  *Psychiatry was consulted while she was at [facility], she was seen by DEC counselor while in the ED.  [place] that she was medically cleared to discharge once medical issues had stabilized.  *Conts on Lyrica as above and Klonopin as needed.    Hx self injurious behavior w/foreign body ingestion  *Hx of 47 endoscopies in last 4 years for foreign body extractions ([place], mickie hair pins, etc)    Chronic abdominal pain  *Noted subacute/chronic issue. Seen at [facility] on 3/29/24 and had normal EGD then left AMA; path report negative for dysplasia, gastritis, or Helicobacter.  *Abx xray 3/31 was nl.   *Chronic and stable on PPI.     Morbid obesity  *Is on Wegovy, reports weight loss of 60 pounds recently. [place] to receive this medication while in the hospital, was given on 3/31.  *Will cont weekly on Sundays while hospitalized, brought in home supply to use.    ZOHRA  *Brought in home CPAP to use on 4/3.     FEN: IVFs dc'd on 4/3, lytes stable, regular diet  DVT Prophylaxis: PCDs  Code Status: Full Code    Clinically Significant Risk Factors          # Hypocalcemia: Lowest Ca = 8.3 mg/dL in last 2 days, will monitor and replace as appropriate      # Coagulation Defect: INR = " "1.34 (Ref range: 0.85 - 1.15) and/or PTT = 29 Seconds (Ref range: 22 - 38 Seconds), will monitor for bleeding           # Severe Obesity: Estimated body mass index is 46.21 kg/m  as calculated from the following:    Height as of 3/31/24: 1.575 m (5' 2\").    Weight as of 3/31/24: 114.6 kg (252 lb 10.4 oz)., PRESENT ON ADMISSION       # Financial/Environmental Concerns: none         Disposition: Anticipate discharge in 7+ days pending completion of plasma exchange treatments and clinical improvement. PT/OT following to discern whether patient will need a rehab stay vs return to her group home; current recommendation is for TCU.     Brionna Robertson, DO    Medical Decision Making       Please see A&P for additional details of medical decision making.       Interval History   Chart reviewed. Seen this morning. Feeling okay today. Slept better last night and was able to shower. No specific complaints. No cp/sob/cough, abd pain/n/v. Hasn't noticed any significant improvement in weakness thus far. Due for 2nd PLEX today. Asking for PM meds to be given at 8pm and to get her weekly Wegovy this Sunday (4/7).  Also asking if she can go off the floor (with someone/an aid) on days she doesn't have PLEX.      -Data reviewed today: I reviewed all new labs and imaging results over the last 24 hours. I personally reviewed no images or EKG's today.    Physical Exam   Temp: 98.1  F (36.7  C) Temp src: Oral BP: 95/53 Pulse: 67   Resp: 18 SpO2: 99 % O2 Device: None (Room air)    Vitals:     Vital Signs with Ranges  Temp:  [97.7  F (36.5  C)-98.1  F (36.7  C)] 98.1  F (36.7  C)  Pulse:  [67-91] 67  Resp:  [16-18] 18  BP: ()/(53-67) 95/53  SpO2:  [97 %-99 %] 99 %  I/O last 3 completed shifts:  In: 240 [P.O.:240]  Out: 350 [Urine:350]    Constitutional: Resting comfortably, alert and conversing appropriately, NAD  Respiratory: CTAB, no wheeze, no increased work of breathing  Cardiovascular: HRRR, no MGR, no LE edema  GI: S, " NT, ND, +BS  Skin/Integumen: warm/dry  Other:      Medications   Current Facility-Administered Medications   Medication Dose Route Frequency Provider Last Rate Last Admin     Current Facility-Administered Medications   Medication Dose Route Frequency Provider Last Rate Last Admin    cetirizine (zyrTEC) tablet 10 mg  10 mg Oral At Bedtime Brionna Robertson DO   10 mg at 04/03/24 2134    ferrous sulfate (FEROSUL) tablet 325 mg  325 mg Oral Daily with breakfast Melida Cedillo MD   325 mg at 04/03/24 0840    sodium chloride 0.9% DIALYSIS Cath LOCK - BLUE Lumen  1.3-2.6 mL Intracatheter Once Nish Smalls PA-C        Followed by    heparin 1000 unit/mL DIALYSIS Cath LOCK - BLUE Lumen  3 mL Intracatheter Once Nish Smalls PA-C        sodium chloride 0.9% DIALYSIS Cath LOCK - RED Lumen  1.3-2.6 mL Intracatheter Once Nish Smalls PA-C        Followed by    heparin 1000 unit/mL DIALYSIS Cath LOCK - RED Lumen  3 mL Intracatheter Once Nish Smalls PA-C        montelukast (SINGULAIR) tablet 10 mg  10 mg Oral QPM Melida Cedillo MD   10 mg at 04/03/24 2048    norethindrone (AYGESTIN) tablet 5 mg  5 mg Oral Daily Melida Cedillo MD   5 mg at 04/03/24 0839    pantoprazole (PROTONIX) EC tablet 40 mg  40 mg Oral Daily Melida Cedillo MD   40 mg at 04/03/24 0839    pregabalin (LYRICA) capsule 100 mg  100 mg Oral QAM Melida Cedillo MD   100 mg at 04/03/24 0839    pregabalin (LYRICA) capsule 200 mg  200 mg Oral At Bedtime Melida Cedillo MD   200 mg at 04/03/24 2134    psyllium (METAMUCIL/KONSYL) Packet 1 packet  1 packet Oral Daily Melida Cedillo MD   1 packet at 04/03/24 0839    sodium chloride (PF) 0.9% PF flush 3 mL  3 mL Intracatheter Q8H Melida Cedillo MD   3 mL at 04/04/24 0637    vitamin D3 (CHOLECALCIFEROL) tablet 50 mcg  50 mcg Oral Daily Melida Cedillo MD   50 mcg at 04/03/24 0840       Data   Recent Labs    Lab 04/04/24  0557 04/04/24  0010 04/02/24  1018 04/02/24  0518 03/31/24  0740 03/30/24  1504 03/29/24  1242   WBC  --  14.1* 7.1  --  6.6   < > 11.2*   HGB  --  11.6* 13.1  --  13.4   < > 14.4   MCV  --  89 91  --  93   < > 90   PLT  --  161 258  --  298   < > 294   INR  --  1.34*  --   --   --   --   --      --  141  --  140   < > 142   POTASSIUM 3.8  --  3.6  --  4.2   < > 4.2   CHLORIDE 109*  --  108*  --  107   < > 107   CO2 24  --  20*  --  25   < > 26   BUN 16.5  --  12.6  --  15.5   < > 8.7   CR 0.62  --  0.54  --  0.79   < > 0.70   ANIONGAP 9  --  13  --  8   < > 9   KELBY 8.3*  --  8.7  --  9.1   < > 9.6   *  --  151* 105* 102*   < > 119*   ALBUMIN 3.5  --   --   --   --   --  4.4   PROTTOTAL  --   --   --   --   --   --  7.2   BILITOTAL  --   --   --   --   --   --  0.3   ALKPHOS  --   --   --   --   --   --  82   ALT  --   --   --   --   --   --  26   AST  --   --   --   --   --   --  17   LIPASE  --   --   --   --   --   --  43    < > = values in this interval not displayed.       No results found for this or any previous visit (from the past 24 hour(s)).

## 2024-04-05 ENCOUNTER — APPOINTMENT (OUTPATIENT)
Dept: PHYSICAL THERAPY | Facility: CLINIC | Age: 34
DRG: 074 | End: 2024-04-05
Attending: HOSPITALIST
Payer: COMMERCIAL

## 2024-04-05 ENCOUNTER — APPOINTMENT (OUTPATIENT)
Dept: MRI IMAGING | Facility: CLINIC | Age: 34
DRG: 074 | End: 2024-04-05
Attending: PSYCHIATRY & NEUROLOGY
Payer: COMMERCIAL

## 2024-04-05 ENCOUNTER — APPOINTMENT (OUTPATIENT)
Dept: OCCUPATIONAL THERAPY | Facility: CLINIC | Age: 34
DRG: 074 | End: 2024-04-05
Attending: HOSPITALIST
Payer: COMMERCIAL

## 2024-04-05 PROCEDURE — 250N000013 HC RX MED GY IP 250 OP 250 PS 637: Performed by: INTERNAL MEDICINE

## 2024-04-05 PROCEDURE — 97112 NEUROMUSCULAR REEDUCATION: CPT | Mod: GO

## 2024-04-05 PROCEDURE — 97530 THERAPEUTIC ACTIVITIES: CPT | Mod: GP | Performed by: PHYSICAL THERAPIST

## 2024-04-05 PROCEDURE — 99232 SBSQ HOSP IP/OBS MODERATE 35: CPT | Performed by: INTERNAL MEDICINE

## 2024-04-05 PROCEDURE — 99207 PR NO BILLABLE SERVICE THIS VISIT: CPT | Performed by: INTERNAL MEDICINE

## 2024-04-05 PROCEDURE — 120N000001 HC R&B MED SURG/OB

## 2024-04-05 PROCEDURE — 250N000011 HC RX IP 250 OP 636: Performed by: INTERNAL MEDICINE

## 2024-04-05 PROCEDURE — 97535 SELF CARE MNGMENT TRAINING: CPT | Mod: GO

## 2024-04-05 PROCEDURE — 72141 MRI NECK SPINE W/O DYE: CPT

## 2024-04-05 RX ORDER — GADOBUTROL 604.72 MG/ML
10 INJECTION INTRAVENOUS ONCE
Status: COMPLETED | OUTPATIENT
Start: 2024-04-05 | End: 2024-04-06

## 2024-04-05 RX ORDER — HYDROMORPHONE HYDROCHLORIDE 2 MG/1
2 TABLET ORAL
Status: DISCONTINUED | OUTPATIENT
Start: 2024-04-05 | End: 2024-04-05

## 2024-04-05 RX ORDER — HYDROMORPHONE HYDROCHLORIDE 2 MG/1
2 TABLET ORAL EVERY 4 HOURS PRN
Status: DISCONTINUED | OUTPATIENT
Start: 2024-04-05 | End: 2024-04-16 | Stop reason: HOSPADM

## 2024-04-05 RX ADMIN — HYDROMORPHONE HYDROCHLORIDE 0.3 MG: 1 INJECTION, SOLUTION INTRAMUSCULAR; INTRAVENOUS; SUBCUTANEOUS at 22:56

## 2024-04-05 RX ADMIN — FERROUS SULFATE TAB 325 MG (65 MG ELEMENTAL FE) 325 MG: 325 (65 FE) TAB at 08:12

## 2024-04-05 RX ADMIN — ONDANSETRON 4 MG: 2 INJECTION INTRAMUSCULAR; INTRAVENOUS at 07:23

## 2024-04-05 RX ADMIN — CYCLOBENZAPRINE 10 MG: 10 TABLET, FILM COATED ORAL at 14:02

## 2024-04-05 RX ADMIN — HYDROMORPHONE HYDROCHLORIDE 2 MG: 2 TABLET ORAL at 11:48

## 2024-04-05 RX ADMIN — PSYLLIUM HUSK 1 PACKET: 3.4 POWDER ORAL at 10:05

## 2024-04-05 RX ADMIN — HYDROMORPHONE HYDROCHLORIDE 2 MG: 2 TABLET ORAL at 18:53

## 2024-04-05 RX ADMIN — MONTELUKAST 10 MG: 10 TABLET, FILM COATED ORAL at 20:29

## 2024-04-05 RX ADMIN — HYDROMORPHONE HYDROCHLORIDE 2 MG: 2 TABLET ORAL at 00:55

## 2024-04-05 RX ADMIN — Medication 50 MCG: at 08:12

## 2024-04-05 RX ADMIN — NORETHINDRONE ACETATE 5 MG: 5 TABLET ORAL at 08:12

## 2024-04-05 RX ADMIN — PREGABALIN 100 MG: 100 CAPSULE ORAL at 08:12

## 2024-04-05 RX ADMIN — CETIRIZINE HYDROCHLORIDE 10 MG: 10 TABLET, FILM COATED ORAL at 20:30

## 2024-04-05 RX ADMIN — PANTOPRAZOLE SODIUM 40 MG: 40 TABLET, DELAYED RELEASE ORAL at 08:13

## 2024-04-05 RX ADMIN — ACETAMINOPHEN 1000 MG: 500 TABLET, FILM COATED ORAL at 14:43

## 2024-04-05 RX ADMIN — HYDROMORPHONE HYDROCHLORIDE 2 MG: 2 TABLET ORAL at 06:15

## 2024-04-05 RX ADMIN — PREGABALIN 200 MG: 100 CAPSULE ORAL at 20:29

## 2024-04-05 RX ADMIN — ACETAMINOPHEN 1000 MG: 500 TABLET, FILM COATED ORAL at 08:13

## 2024-04-05 ASSESSMENT — ACTIVITIES OF DAILY LIVING (ADL)
ADLS_ACUITY_SCORE: 59
ADLS_ACUITY_SCORE: 53
ADLS_ACUITY_SCORE: 59
ADLS_ACUITY_SCORE: 52
ADLS_ACUITY_SCORE: 59
ADLS_ACUITY_SCORE: 61
ADLS_ACUITY_SCORE: 59
ADLS_ACUITY_SCORE: 53
ADLS_ACUITY_SCORE: 59
ADLS_ACUITY_SCORE: 54
ADLS_ACUITY_SCORE: 53
ADLS_ACUITY_SCORE: 54
ADLS_ACUITY_SCORE: 52
ADLS_ACUITY_SCORE: 52
ADLS_ACUITY_SCORE: 59
ADLS_ACUITY_SCORE: 52
ADLS_ACUITY_SCORE: 59
ADLS_ACUITY_SCORE: 59
ADLS_ACUITY_SCORE: 52
ADLS_ACUITY_SCORE: 53
ADLS_ACUITY_SCORE: 59
ADLS_ACUITY_SCORE: 53
ADLS_ACUITY_SCORE: 61

## 2024-04-05 NOTE — PROGRESS NOTES
04/05/24 1000   Appointment Info   Signing Clinician's Name / Credentials (PT) Anjelica Dos Santos, PT, DPT   Interventions   Interventions Quick Adds Gait Training   Therapeutic Activity   Therapeutic Activities: dynamic activities to improve functional performance Minutes (33746) 38   Symptoms Noted During/After Treatment Fatigue;Increased pain   Treatment Detail/Skilled Intervention Amenable to PT, laying supine in bed at start. Completed sup>sit w/Renae and VCs for LE advancement. Able to scoot fully to side of bed on her own, SBA. Completed sit>stand at SaraSteady from lower bed height w/CGA x 2persons. Transferred to transport chair. Completed hallway amb [id number]. No knee buckling, hesitant, intermittently dragging B LEs. Gentle encouragement, educ throughout. Wheeled back into room, SaraSteady transfer w/SBA x 1 person. Positioned in recliner for comfort, chair alarm on.   Gait Training   Treatment Detail/Skilled Intervention @ R rail, close chair follow   Distance in Feet 10'   [place] Level (Gait Training) contact guard   PT Discharge Planning   PT Plan Progress ambulation, LRD   PT Discharge Recommendation (DC Rec) Acute Rehab Center-Motivated patient will benefit from intensive, interdisciplinary therapy.  Anticipate will be able to tolerate 3 hours of therapy per day;Transitional Care Facility   PT Rationale for DC Rec Pt presents with pronounced weaness and functional impairments, potentially with functional component as well as exhibited by inconsistent motor performance, better with distractibility. Ambulated 10' w/CGA at R rail on 4/5. Anticipate ARU can offer a higher frequency of therapies, coordinated delivery of both rehab and psychological therapies for pt in order to maximize fn outcomes and reduce risk of re-hospitalization. Will continue to assess for tolerance for 3+hrs of therapy daily. Pt is not safe to d/c back to prior group home at this time, where pt describes she was indep w/fn  mobility. If d/c back there, pt would need Ax2 using SaraSteady and hospital bed for safety w/transfers.   PT Brief overview of current status SB-CG w/SaraSteady   Total Session Time   Timed Code Treatment Minutes 38   Total Session Time (sum of timed and untimed services) 38

## 2024-04-05 NOTE — PLAN OF CARE
Reason for Admission:  Acute bilateral lower extremity paralysis due to CIDP (chronic inflammatory demyelinating polyneuropathy) flare, s/p PLEX starting 4/2; Chronic bilateral LE neuropathy    Cognitive/Mentation: A/Ox 4  Neuros/CMS: Intact ex BLE weakness and numbness in bilateral toes and top of Left foot.  Moderate  strength (2/5); Right hand weaker than Left hand strength.  Unable to lift legs in bed (1/5).  +1/+2 BLE edema.    VS: Stable on RA.   Tele: NA.  GI: Last BM 4/4. Continent.  :   Voiding adequately.  Continent to BSC.  Purewick in place overnight, per pt request.  Pulmonary: LS clear.  Denies MESSINA.    Pain: PO Dilaudid and Tylenol for Right-sided neck pain around tunneled catheter and lower back pain.     Drains/Lines: LUE Triple lumen PICC; SL.  R internal jugular CVC for apheresis; Heparin locked.    Skin: Intact; scattered bruising present.    Activity: Assist x 1-2 with Yajaira Steady.  Diet: Regular with thin liquids. Takes pills whole.     Therapies recs: TCU.  Discharge: Pending PLEX schedule; 5-7 treatments in total planned.    Aggression Stoplight Tool: Green    End of shift summary: PLEX #3 planned for 4/6.  Patient calm and cooperative this shift; compliant with all cares.  Completed PT and OT sessions today.  Took several steps in hallway with PT; tolerated well, denied lightheadedness/dizziness or nausea.  Utilizing tools for BUE strength from OT.  IS demonstrated and encouraged.

## 2024-04-05 NOTE — PROGRESS NOTES
[facility]    HOSPITALIST PROGRESS NOTE :   --------------------------------------------------    Date of Admission:  4/1/2024    Cumulative Summary:   Nevin Alvarado is a 33 year old female with complex PMHx which includes hx of CIDP with neuropathy, bipolar disorder, borderline personality disorder, depression, anxiety, chronic pain and history of self-injurious behavior with numerous foreign body ingestion in the past, morbid obesity and ZOHRA. She was initially admitted to [facility] on 3/30/2024 for evaluation of acute lower extremity paresis. Additional workup was pursued including MRI of lumbar spine and an LP. She was seen by general neurology service who recommended treatment with plasma exchange. She was subsequently transferred to [facility] on 4/1/2024 to coordinate plasma exchange treatment.     Assessment & Plan     Acute bilateral lower extremity paralysis dt CIDP flare, s/p PLEX starting 4/2  CIDP (chronic inflammatory demyelinating polyneuropathy) (H) (4/1/2024)  Chronic bilateral LE neuropathy, sec to above    *Has history of CIDP with peripheral neuropathy.  She underwent treatments with IVIG in the past.  *Initially presented to [facility] ED on 3/30/2024 for evaluation of new onset back pain and bilateral lower extremity weakness (mostly experiences numbness, so weakness was a new symptom for her).  She was unable to ambulate.  *In the ED, labs were generally unremarkable.  General neurology was consulted.  She underwent further evaluation with MRI of the thoracic and lumbar spines.  MRI of the thoracic spine was unremarkable.  MRI lumbar spine showed enlargement of the cauda equina nerve roots and enhancement which was suggestive of CIDP per neurology.  She will underwent a lumbar puncture which showed elevated protein and albumin cytologic dissociation which confirmed the diagnosis of CIDP.  She was given 1 g of IV Solu-Medrol and  "transferred to [facility] to initiate plasma exchange. PICC line was placed.  *Tunneled catheter placed per IR on 4/2 and patient underwent her first round of plasma exchange later that day.  04/05/2024: Patient care was assumed, seen and examined, worked with therapies, was able to take couple of the steps with PT but having numbness on anterior aspect of the foot also having new motor weakness in right hand with gripping.  Patient is also complaining of discomfort at the tunneled catheter site, trying to use more Tylenol, does not think that 2 mg of Dilaudid takes care of the pain when it is significantly worse.    -- ongoing management per neurology  -- cont PLEX per nephrology; plan is for treatment every other day, treatment #2 on 4/4, plan is for 5-7 treatments total  -- cont routine neurochecks and fall precautions  -- cont PTA Lyrica (100mg in AM and 200mg HS)  -- PT/OT following, recommending TCU vs ARU  -- Will change the dose of Dilaudid to 2 mg for moderate every 4 hours and 3 mg for severe every 4 hours.    Anxiety  Depression  Bipolar disorder  Chronic pain   Hx suspected medication seeking behavior  Frequent medical noncompliance  Frequent utilization of healthcare system  *Complicated psychiatric history.  Patient has a guardian and noted ED treatment plan.  She stated to admitting provider that she may be able to come off her guardianship as \"she is doing really well\" and she was planning to move out of her group home and back to an apartment.  *Psychiatry was consulted while she was at [facility], she was seen by DEC counselor while in the ED.  [place] that she was medically cleared to discharge once medical issues had stabilized.  -- Continue on Lyrica as above and Klonopin as needed.     Hx self injurious behavior w/foreign body ingestion  *Hx of 47 endoscopies in last 4 years for foreign body extractions ([place], mickie hair pins, etc)     Chronic abdominal pain  *Noted " "subacute/chronic issue. Seen at [facility] on 3/29/24 and had normal EGD then left AMA; path report negative for dysplasia, gastritis, or Helicobacter.  *Abx xray 3/31 was nl.   *Chronic and stable on PPI.      Morbid obesity  *Is on Wegovy, reports weight loss of 60 pounds recently. [place]ant to receive this medication while in the hospital, was given on 3/31.  *Will continue weekly on Sundays while hospitalized, brought in home supply to use.     ZOHRA  *Brought in home CPAP to use on 4/3.       Clinically Significant Risk Factors          # Hypocalcemia: Lowest Ca = 8.3 mg/dL in last 2 days, will monitor and replace as appropriate      # Coagulation Defect: INR = 1.34 (Ref range: 0.85 - 1.15) and/or PTT = 29 Seconds (Ref range: 22 - 38 Seconds), will monitor for bleeding           # Severe Obesity: Estimated body mass index is 46.21 kg/m  as calculated from the following:    Height as of 3/31/24: 1.575 m (5' 2\").    Weight as of 3/31/24: 114.6 kg (252 lb 10.4 oz)., PRESENT ON ADMISSION     # Financial/Environmental Concerns: none       Diet: Regular Diet Adult  Snacks/Supplements Adult: Ensure Max Protein (bariatric); Between Meals    Hazel Catheter: Not present  DVT Prophylaxis: Pneumatic Compression Devices  Code Status: Full Code    The patient's care was discussed with the Bedside Nurse and Patient.    Medical Decision Making       43 MINUTES SPENT BY ME on the date of service doing chart review, history, exam, documentation & further activities per the note.      Disposition Plan   Expecting patient to stay for another 5 to 7 days while patient is undergoing Plex treatment, will follow-up on therapy's recommendation if they would recommend acute rehab versus TCU versus returning to her group home.     Expected Discharge Date: 04/14/2024        Discharge Comments: Discharge once PLEX treatments complete (plan is for PLEX every other day)     Entered: Shelia Goyal MD 04/05/2024, 7:27 AM       Shelia Goyal MD, MD, " [facility]  Text Page (7am - 6pm)    ----------------------------------------------------------------------------------------------------------------------      Interval History     Patient care was assumed this morning, patient was seen and examined, did work with therapies earlier.  Complaining of discomfort at the tunneled catheter site.  She has noticed more weakness in the right upper extremity, doing some hand exercises which are recommended by therapies.  She thinks that her bilateral lower extremities might be slightly better as compared to before she did work with physical therapy and thinks that she cannot still  the food but she was able to drive for few steps.  She would like to continue taking her weekly Wegovy as planned for this Sunday 04/07.    -Data reviewed today: I reviewed all new labs and imaging results over the last 24 hours.    I personally reviewed no images or EKG's today.    Physical Exam   Temp: 97.9  F (36.6  C) Temp src: Oral BP: 102/53 Pulse: 93   Resp: 18 SpO2: 97 % O2 Device: None (Room air)    Vitals:     Vital Signs with Ranges  Temp:  [97.9  F (36.6  C)-98.2  F (36.8  C)] 97.9  F (36.6  C)  Pulse:  [76-93] 93  Resp:  [18] 18  BP: (102-139)/(53-83) 102/53  SpO2:  [97 %] 97 %  I/O last 3 completed shifts:  In: 501 [P.O.:480; I.V.:21]  Out: 1500 [Urine:1500]    GENERAL: Alert , awake and oriented. NAD. Conversational, appropriate.   HEENT: Normocephalic. EOMI. No icterus or injection. Nares normal.   LUNGS: Clear to auscultation. No dyspnea at rest.   HEART: Regular rate. Extremities perfused.   ABDOMEN: Soft, nontender, and nondistended. Positive bowel sounds.   EXTREMITIES: No LE edema noted.  Bilateral lower extremity weakness, right upper hand weakness with gripping, left upper extremity motor strength is 5 out of 5.  1/5 bilateral lower extremity strength absent vibration and sensation in bilateral toes.      Medications   Current Facility-Administered Medications   Medication  Dose Route Frequency Provider Last Rate Last Admin     Current Facility-Administered Medications   Medication Dose Route Frequency Provider Last Rate Last Admin    cetirizine (zyrTEC) tablet 10 mg  10 mg Oral At Bedtime Brionna Robertson DO   10 mg at 04/04/24 2019    ferrous sulfate (FEROSUL) tablet 325 mg  325 mg Oral Daily with breakfast Melida Cedillo MD   325 mg at 04/04/24 0849    sodium chloride 0.9% DIALYSIS Cath LOCK - BLUE Lumen  1.3-2.6 mL Intracatheter Once Nish Smalls PA-C        Followed by    heparin 1000 unit/mL DIALYSIS Cath LOCK - BLUE Lumen  3 mL Intracatheter Once Nish Smalls PA-C        sodium chloride 0.9% DIALYSIS Cath LOCK - RED Lumen  1.3-2.6 mL Intracatheter Once Nish Smalls PA-C        Followed by    heparin 1000 unit/mL DIALYSIS Cath LOCK - RED Lumen  3 mL Intracatheter Once Nish Smalls PA-C        montelukast (SINGULAIR) tablet 10 mg  10 mg Oral QPM Melida Cedillo MD   10 mg at 04/04/24 2019    norethindrone (AYGESTIN) tablet 5 mg  5 mg Oral Daily Melida Cedillo MD   5 mg at 04/04/24 0849    pantoprazole (PROTONIX) EC tablet 40 mg  40 mg Oral Daily Melida Cedillo MD   40 mg at 04/04/24 0849    pregabalin (LYRICA) capsule 100 mg  100 mg Oral QAM Melida Cedillo MD   100 mg at 04/04/24 0849    pregabalin (LYRICA) capsule 200 mg  200 mg Oral At Bedtime Brionna Robertson DO   200 mg at 04/04/24 2019    psyllium (METAMUCIL/KONSYL) Packet 1 packet  1 packet Oral Daily Melida Cedillo MD   1 packet at 04/04/24 0849    [START ON 4/7/2024] Semaglutide-Weight Management (WEGOVY) pen SOAJ 1 mg  1 mg Subcutaneous Weekly Brionna Robertson DO        sodium chloride (PF) 0.9% PF flush 3 mL  3 mL Intracatheter Q8H Melida Cedillo MD   3 mL at 04/04/24 2333    vitamin D3 (CHOLECALCIFEROL) tablet 50 mcg  50 mcg Oral Daily Melida Cedillo MD   50 mcg at 04/04/24 0898        Data   Recent Labs   Lab 04/04/24  0557 04/04/24  0010 04/02/24  1018 04/02/24  0518 03/31/24  0740 03/30/24  1504 03/29/24  1242   WBC  --  14.1* 7.1  --  6.6   < > 11.2*   HGB  --  11.6* 13.1  --  13.4   < > 14.4   MCV  --  89 91  --  93   < > 90   PLT  --  161 258  --  298   < > 294   INR  --  1.34*  --   --   --   --   --      --  141  --  140   < > 142   POTASSIUM 3.8  --  3.6  --  4.2   < > 4.2   CHLORIDE 109*  --  108*  --  107   < > 107   CO2 24  --  20*  --  25   < > 26   BUN 16.5  --  12.6  --  15.5   < > 8.7   CR 0.62  --  0.54  --  0.79   < > 0.70   ANIONGAP 9  --  13  --  8   < > 9   KELBY 8.3*  --  8.7  --  9.1   < > 9.6   *  --  151* 105* 102*   < > 119*   ALBUMIN 3.5  --   --   --   --   --  4.4   PROTTOTAL  --   --   --   --   --   --  7.2   BILITOTAL  --   --   --   --   --   --  0.3   ALKPHOS  --   --   --   --   --   --  82   ALT  --   --   --   --   --   --  26   AST  --   --   --   --   --   --  17   LIPASE  --   --   --   --   --   --  43    < > = values in this interval not displayed.       Imaging:   No results found for this or any previous visit (from the past 24 hour(s)).

## 2024-04-05 NOTE — PLAN OF CARE
Pt here with CIDP undergoing PLEX treatment, next one is Saturday 4/6. Writer took over care at 0400. Pt is do not disturb overnight. Sitter remains at bedside for safety (has hx of ingesting small objects). Per report- A/O x4, BLE weakness 1/5, continent, A2 with lift/mabel steady. Regular diet with thin liquids, takes pills whole.  Triple lumen PICC Sl, tunneled catheter R internal jugular. PRN Dilaudid given x1. Therapies rec TCU and discharge pending.

## 2024-04-05 NOTE — PROGRESS NOTES
Renal Medicine Chart Check      Plex #3 04/06/24        JODY Puckett    [facility]  [phone]

## 2024-04-05 NOTE — PROGRESS NOTES
Reason for Admission: lower extremity paralysis  Cognitive/Mentation: A/Ox 4  Neuros/CMS: Stable w/ BLE weakness  VS: VSS.   Tele: NSR.  GI: BS clear, , last BM 4/4. Continent.   : Continent. Purewick in place  Pulmonary: LS clear.  Pain: complaint of pain on port site PRN dilaudid given and ice utilized.    Drains/Lines: PICC SL  Skin: scattered bruises  Activity: Assist x 2 with lift.  Diet: regular with thin liquids. Takes pills whole.   Therapies recs: TCU   Discharge: pending  Aggression Stoplight Tool: green  End of shift summary: sitter at bedside, refused braces throughout the night,PLEX on Saturday. Do Not Disturb Over Night.

## 2024-04-05 NOTE — PROGRESS NOTES
.[facility]  [facility] [place]  Neurology Daily Note                Interval History:   Nevin Alvarado is a 33-year-old patient who has a history for bipolar disorder, general anxiety disorder and depression who has a history for CIDP and had been on followed in [facility],  More recently with [facility] who presented with worsening lower extremity weakness and was admitted on3/30/2024 when she was seen by .  At that time she was also complaining of worsening lower back pain and had MRI of the lumbar spine, and MRI of the thoracic spine done with and without contrast, neither of which showing any acute neural compression or abnormal cord signal, however, there was evidence of slight no nodular enhancement of the nerves of the cauda equina concerning for inflammatory neuropathy.  Patient had had EMG done by general  Clinic neurologist which was said to demonstrate axonal neuropathy although, she has been diagnosed with CIDP in the past and notably had been treated with IVIG and prednisone and the patient reporting that this treatment had not been helpful.  She was initially treated with IV Solu-Medrol, was was subsequently started on Plex and has completed 2 of 5-7 sessions reporting some improvement in strength in her lower extremities.  However, she is now reporting having weakness in the right upper extremity.         Review of Systems:   The 10 point Review of Systems is negative other than noted in the HPI       Medications:   Scheduled Meds:  Current Facility-Administered Medications   Medication Dose Route Frequency Provider Last Rate Last Admin    cetirizine (zyrTEC) tablet 10 mg  10 mg Oral At Bedtime Brionna Robertson DO   10 mg at 04/04/24 2019    ferrous sulfate (FEROSUL) tablet 325 mg  325 mg Oral Daily with breakfast Melida Cedillo MD   325 mg at 04/05/24 0812    sodium chloride 0.9% DIALYSIS Cath LOCK - BLUE Lumen  1.3-2.6 mL  Intracatheter Once Nish Smalls PA-C        Followed by    heparin 1000 unit/mL DIALYSIS Cath LOCK - BLUE Lumen  3 mL Intracatheter Once Nish Smalls PA-C        sodium chloride 0.9% DIALYSIS Cath LOCK - RED Lumen  1.3-2.6 mL Intracatheter Once Nish Smalls PA-C        Followed by    heparin 1000 unit/mL DIALYSIS Cath LOCK - RED Lumen  3 mL Intracatheter Once Nish Smalls PA-C        montelukast (SINGULAIR) tablet 10 mg  10 mg Oral QPM Melida Cedillo MD   10 mg at 04/04/24 2019    norethindrone (AYGESTIN) tablet 5 mg  5 mg Oral Daily Melida Cedillo MD   5 mg at 04/05/24 0812    pantoprazole (PROTONIX) EC tablet 40 mg  40 mg Oral Daily Melida Cedillo MD   40 mg at 04/05/24 0813    pregabalin (LYRICA) capsule 100 mg  100 mg Oral QAM Melida Cedillo MD   100 mg at 04/05/24 0812    pregabalin (LYRICA) capsule 200 mg  200 mg Oral At Bedtime Brionna Robertson DO   200 mg at 04/04/24 2019    psyllium (METAMUCIL/KONSYL) Packet 1 packet  1 packet Oral Daily Melida Cedillo MD   1 packet at 04/05/24 1005    [START ON 4/7/2024] Semaglutide-Weight Management (WEGOVY) pen SOAJ 1 mg  1 mg Subcutaneous Weekly Brionna Robertson DO        sodium chloride (PF) 0.9% PF flush 3 mL  3 mL Intracatheter Q8H Melida Cedillo MD   10 mL at 04/05/24 1402    vitamin D3 (CHOLECALCIFEROL) tablet 50 mcg  50 mcg Oral Daily Melida Cedillo MD   50 mcg at 04/05/24 0812     PRN Meds:   Current Facility-Administered Medications   Medication Dose Route Frequency Provider Last Rate Last Admin    acetaminophen (TYLENOL) tablet 1,000 mg  1,000 mg Oral Q6H PRN Melida Cedillo MD   1,000 mg at 04/05/24 1443    albuterol (PROVENTIL HFA/VENTOLIN HFA) inhaler  2 puff Inhalation Q6H PRN Melida Cedillo MD        benzonatate (TESSALON) capsule 100 mg  100 mg Oral TID PRN Melida Cedillo MD        calcium carbonate (TUMS) chewable  tablet 1,000 mg  1,000 mg Oral 4x Daily PRN Melida Cedillo MD        clonazePAM (klonoPIN) tablet 0.5 mg  0.5 mg Oral Daily PRN Melida Cedillo MD        cyclobenzaprine (FLEXERIL) tablet 10 mg  10 mg Oral TID PRN Melida Cedillo MD   10 mg at 04/05/24 1402    heparin Lock (1000 units/mL High concentration) 2,000-6,000 Units  2-6 mL Intravenous Once PRN Barney Lovell MD        HYDROmorphone (DILAUDID) half-tab 3 mg  3 mg Oral Q4H PRN Shelia Goyal MD        HYDROmorphone (DILAUDID) tablet 2 mg  2 mg Oral Q4H PRN Shelia Goyal MD        HYDROmorphone (PF) (DILAUDID) injection 0.3 mg  0.3 mg Intravenous Q6H PRN Melida Cedillo MD        lidocaine (LMX4) cream   Topical Q1H PRN Melida Cedillo MD        lidocaine 1 % 0.1-1 mL  0.1-1 mL Other Q1H PRN Melida Cedillo MD        naloxone (NARCAN) injection 0.2 mg  0.2 mg Intravenous Q2 Min PRN Andrew Cunningham MD        Or    naloxone (NARCAN) injection 0.4 mg  0.4 mg Intravenous Q2 Min PRN Andrew Cunningham MD        Or    naloxone (NARCAN) injection 0.2 mg  0.2 mg Intramuscular Q2 Min PRN Andrew Cunningham MD        Or    naloxone (NARCAN) injection 0.4 mg  0.4 mg Intramuscular Q2 Min PRN Andrew Cunningham MD        ondansetron (ZOFRAN ODT) ODT tab 4 mg  4 mg Oral Q6H PRN Melida Cedillo MD        Or    ondansetron (ZOFRAN) injection 4 mg  4 mg Intravenous Q6H PRN Melida Cedillo MD   4 mg at 04/05/24 0723    polyethylene glycol (MIRALAX) powder 17 g  17 g Oral Daily PRN Melida Cedillo MD        senna-docusate (SENOKOT-S/PERICOLACE) 8.6-50 MG per tablet 1 tablet  1 tablet Oral BID PRN Melida Cedillo MD        Or    senna-docusate (SENOKOT-S/PERICOLACE) 8.6-50 MG per tablet 2 tablet  2 tablet Oral BID PRN Melida Cedillo MD        sodium chloride (PF) 0.9% PF flush 3 mL  3 mL Intracatheter q1 min prn Melida Cedillo MD   3 mL at 04/02/24 0523    sodium chloride 0.9% BOLUS 250 mL  250 mL  Intravenous BID Barney Barboza MD               Physical Exam:   Vitals: Blood pressure 139/68, pulse 91, temperature 98.5  F (36.9  C), temperature source Oral, resp. rate 18, last menstrual period 07/12/2023, SpO2 98%, not currently breastfeeding.  General Appearance:    She was in no apparent respiratory  Neuro:       Mental Status Exam:    Was awake and alert with fluent speech and fund of knowledge was appropriate and she followed simple and complex commands quite well       Cranial Nerves:   Pupils were equal and briskly reactive to light, there was no ptosis, the extraocular movements were full, there was no facial asymmetry.       Motor:   Normal tone in all 4 extremities, grasp on the left was 4+/5, on the right 2+/5, with decreased effort noted.  Motor strength was noted to be 1+/5 proximally and distally in both lower extremities, although the patient had ambulated with physical therapy yesterday, it also appeared that there was decreased effort as she withdrew both lower extremities to stimuli.       Reflexes: 2+/4 in the patella, not elicited at the ankles, plantars were neutral, 1+/4 in the biceps triceps and brachioradialis.       Sensory: Patient responded  she did not appreciate any primary modalities in the lower extremities, but there was no cortical sensory level.                         Gait: Deferred due to weakness  Cardiovascular: Regular rate and rhythm, no m/r/g  Lungs: Clear to auscultation  Abdomen: Soft, not tender, not destended  Extremities: No clubbing, no cyanosis, no edema       Data:   ROUTINE IP LABS (Last 3results)  CBC RESULTS:     Recent Labs   Lab 04/04/24  0010 04/02/24  1018 03/31/24  0740   WBC 14.1* 7.1 6.6   RBC 3.87 4.31 4.39   HGB 11.6* 13.1 13.4   HCT 34.6* 39.4 40.7    258 298     Basic Metabolic Panel:  Recent Labs   Lab 04/04/24  0557 04/02/24  1018 04/02/24  0518 03/31/24  0740    141  --  140   POTASSIUM 3.8 3.6  --  4.2   CHLORIDE 109* 108*  --  " 107   CO2 24 20*  --  25   BUN 16.5 12.6  --  15.5   CR 0.62 0.54  --  0.79   * 151* 105* 102*   KELBY 8.3* 8.7  --  9.1     INR:  Recent Labs   Lab 04/04/24  0010   INR 1.34*      Lipid Profile:  Recent Labs   Lab Test 02/11/22  0844 11/30/20  0823   CHOL 132 130   HDL 41* 44*   LDL 38 50   TRIG 266* 182*     TSH:  TSH   Date Value Ref Range Status   06/27/2023 4.47 (H) 0.30 - 4.20 uIU/mL Final   02/11/2022 4.94 (H) 0.40 - 4.00 mU/L Final   11/30/2020 3.19 0.40 - 4.00 mU/L Final   ,   Vitamin B12:   Lab Results   Component Value Date    B12 203 02/11/2022    B12 279 03/21/2018      A1C: No results found for: \"A1C\"  CSF:       Latest Reference Range & Units 04/01/24 10:43   RBC CSF 0 - 2 /uL 0   Appearance CSF Clear  Clear   Color CSF Colorless  Colorless   Glucose CSF 40 - 70 mg/dL 60   Protein total CSF 15.0 - 45.0 mg/dL 104.9 (H)   (H): Data is abnormally high    MRI lumbar spine without and with contrast  3/30/2024    No significant neural compression or evidence for central canal stenosis, cauda equina and nerve roots were mildly enlarged in the lower lumbar and sacral exiting root, some of these demonstrating thin none nonnodular enhancement reflecting chronic inflammatory demyelinating polyneuropathy    MRI thoracic spine 3/30/2024  No significant central canal stenosis, mild multilevel degenerative changes with moderate left foraminal stenosis at T6/T7 and mild to moderate left foraminal stenosis at T7-T8, there were no enhancing lesions and no abnormal cord signal      Assessment and Plan:   This is a 33-year-old patient with a history for CIDP, CSF studies demonstrate albumin cytological dissociation and the patient presented with worsening lower extremity weakness.  -- She has been assessed by by  who recommended Plex treatment as the patient reportedly has not responded to IVIG in the past.  --- The patient reported some improvement in her lower extremity weakness, and that she had " ambulated with physical therapy yesterday.  I agree that there does appear to be some functional component to her examination making it somewhat difficult to have objective evaluation of her motor strength.  Recommendations:  -- Continue Plex treatment, 5 sessions anticipated, patient has completed 2 with some signs of improvement.  --- Continue physical and Occupational Therapy.  --- Psychiatric consultation has been recommended by , it is noted that she has already been seen by psychotherapy.  -- MRI of the cervical spine with and without contrast, as the patient is reporting right upper extremity weakness    Angela Zavala MD  [facility]  Telephone number [phone]

## 2024-04-06 ENCOUNTER — APPOINTMENT (OUTPATIENT)
Dept: MRI IMAGING | Facility: CLINIC | Age: 34
DRG: 074 | End: 2024-04-06
Attending: PSYCHIATRY & NEUROLOGY
Payer: COMMERCIAL

## 2024-04-06 ENCOUNTER — APPOINTMENT (OUTPATIENT)
Dept: OCCUPATIONAL THERAPY | Facility: CLINIC | Age: 34
DRG: 074 | End: 2024-04-06
Attending: HOSPITALIST
Payer: COMMERCIAL

## 2024-04-06 LAB
BACTERIA CSF CULT: NO GROWTH
GRAM STAIN RESULT: NORMAL
GRAM STAIN RESULT: NORMAL

## 2024-04-06 PROCEDURE — 36514 APHERESIS PLASMA: CPT

## 2024-04-06 PROCEDURE — 250N000011 HC RX IP 250 OP 636: Performed by: INTERNAL MEDICINE

## 2024-04-06 PROCEDURE — 36514 APHERESIS PLASMA: CPT | Performed by: INTERNAL MEDICINE

## 2024-04-06 PROCEDURE — 255N000002 HC RX 255 OP 636: Performed by: INTERNAL MEDICINE

## 2024-04-06 PROCEDURE — 99232 SBSQ HOSP IP/OBS MODERATE 35: CPT | Performed by: INTERNAL MEDICINE

## 2024-04-06 PROCEDURE — 72156 MRI NECK SPINE W/O & W/DYE: CPT

## 2024-04-06 PROCEDURE — 250N000013 HC RX MED GY IP 250 OP 250 PS 637: Performed by: INTERNAL MEDICINE

## 2024-04-06 PROCEDURE — 250N000009 HC RX 250: Performed by: INTERNAL MEDICINE

## 2024-04-06 PROCEDURE — 258N000003 HC RX IP 258 OP 636: Performed by: INTERNAL MEDICINE

## 2024-04-06 PROCEDURE — A9585 GADOBUTROL INJECTION: HCPCS | Performed by: INTERNAL MEDICINE

## 2024-04-06 PROCEDURE — P9045 ALBUMIN (HUMAN), 5%, 250 ML: HCPCS | Mod: JZ | Performed by: INTERNAL MEDICINE

## 2024-04-06 PROCEDURE — 120N000001 HC R&B MED SURG/OB

## 2024-04-06 PROCEDURE — 250N000011 HC RX IP 250 OP 636: Performed by: PHYSICIAN ASSISTANT

## 2024-04-06 RX ORDER — ALBUMIN HUMAN 25 %
3250 INTRAVENOUS SOLUTION INTRAVENOUS ONCE
Status: COMPLETED | OUTPATIENT
Start: 2024-04-06 | End: 2024-04-06

## 2024-04-06 RX ORDER — DIPHENHYDRAMINE HCL 25 MG
25 CAPSULE ORAL EVERY 6 HOURS PRN
Status: DISCONTINUED | OUTPATIENT
Start: 2024-04-06 | End: 2024-04-12

## 2024-04-06 RX ORDER — LORAZEPAM 2 MG/ML
1 INJECTION INTRAMUSCULAR
Status: COMPLETED | OUTPATIENT
Start: 2024-04-06 | End: 2024-04-06

## 2024-04-06 RX ORDER — PROCHLORPERAZINE MALEATE 5 MG
5 TABLET ORAL EVERY 6 HOURS PRN
Status: DISCONTINUED | OUTPATIENT
Start: 2024-04-06 | End: 2024-04-16 | Stop reason: HOSPADM

## 2024-04-06 RX ORDER — PROCHLORPERAZINE 25 MG
25 SUPPOSITORY, RECTAL RECTAL EVERY 12 HOURS PRN
Status: DISCONTINUED | OUTPATIENT
Start: 2024-04-06 | End: 2024-04-16 | Stop reason: HOSPADM

## 2024-04-06 RX ORDER — LORAZEPAM 2 MG/ML
1 INJECTION INTRAMUSCULAR
Status: DISCONTINUED | OUTPATIENT
Start: 2024-04-06 | End: 2024-04-07

## 2024-04-06 RX ADMIN — HYDROMORPHONE HYDROCHLORIDE 3 MG: 2 TABLET ORAL at 13:44

## 2024-04-06 RX ADMIN — FERROUS SULFATE TAB 325 MG (65 MG ELEMENTAL FE) 325 MG: 325 (65 FE) TAB at 09:14

## 2024-04-06 RX ADMIN — NORETHINDRONE ACETATE 5 MG: 5 TABLET ORAL at 09:13

## 2024-04-06 RX ADMIN — MONTELUKAST 10 MG: 10 TABLET, FILM COATED ORAL at 21:07

## 2024-04-06 RX ADMIN — GADOBUTROL 10 ML: 604.72 INJECTION INTRAVENOUS at 20:36

## 2024-04-06 RX ADMIN — CYCLOBENZAPRINE 10 MG: 10 TABLET, FILM COATED ORAL at 12:45

## 2024-04-06 RX ADMIN — CETIRIZINE HYDROCHLORIDE 10 MG: 10 TABLET, FILM COATED ORAL at 21:07

## 2024-04-06 RX ADMIN — HYDROMORPHONE HYDROCHLORIDE 0.3 MG: 1 INJECTION, SOLUTION INTRAMUSCULAR; INTRAVENOUS; SUBCUTANEOUS at 04:06

## 2024-04-06 RX ADMIN — CALCIUM GLUCONATE 4 G: 98 INJECTION, SOLUTION INTRAVENOUS at 16:05

## 2024-04-06 RX ADMIN — PREGABALIN 100 MG: 100 CAPSULE ORAL at 09:13

## 2024-04-06 RX ADMIN — PSYLLIUM HUSK 1 PACKET: 3.4 POWDER ORAL at 09:13

## 2024-04-06 RX ADMIN — ALBUMIN HUMAN 3250 ML: 0.05 INJECTION, SOLUTION INTRAVENOUS at 16:04

## 2024-04-06 RX ADMIN — Medication 50 MCG: at 09:13

## 2024-04-06 RX ADMIN — HEPARIN SODIUM 3000 UNITS: 1000 INJECTION INTRAVENOUS; SUBCUTANEOUS at 16:07

## 2024-04-06 RX ADMIN — LORAZEPAM 1 MG: 2 INJECTION INTRAMUSCULAR; INTRAVENOUS at 19:48

## 2024-04-06 RX ADMIN — ANTICOAGULANT CITRATE DEXTROSE SOLUTION FORMULA A 1000 ML: 12.25; 11; 3.65 SOLUTION INTRAVENOUS at 16:05

## 2024-04-06 RX ADMIN — ACETAMINOPHEN 1000 MG: 500 TABLET, FILM COATED ORAL at 18:13

## 2024-04-06 RX ADMIN — PREGABALIN 200 MG: 100 CAPSULE ORAL at 21:07

## 2024-04-06 RX ADMIN — HYDROMORPHONE HYDROCHLORIDE 0.3 MG: 1 INJECTION, SOLUTION INTRAMUSCULAR; INTRAVENOUS; SUBCUTANEOUS at 21:08

## 2024-04-06 RX ADMIN — HYDROMORPHONE HYDROCHLORIDE 0.3 MG: 1 INJECTION, SOLUTION INTRAMUSCULAR; INTRAVENOUS; SUBCUTANEOUS at 09:17

## 2024-04-06 RX ADMIN — PROCHLORPERAZINE MALEATE 5 MG: 5 TABLET ORAL at 23:51

## 2024-04-06 RX ADMIN — PANTOPRAZOLE SODIUM 40 MG: 40 TABLET, DELAYED RELEASE ORAL at 09:14

## 2024-04-06 RX ADMIN — DIPHENHYDRAMINE HYDROCHLORIDE 25 MG: 25 CAPSULE ORAL at 23:51

## 2024-04-06 RX ADMIN — ACETAMINOPHEN 1000 MG: 500 TABLET, FILM COATED ORAL at 11:17

## 2024-04-06 ASSESSMENT — ACTIVITIES OF DAILY LIVING (ADL)
ADLS_ACUITY_SCORE: 52
ADLS_ACUITY_SCORE: 61
ADLS_ACUITY_SCORE: 52
ADLS_ACUITY_SCORE: 61
ADLS_ACUITY_SCORE: 52
ADLS_ACUITY_SCORE: 58
ADLS_ACUITY_SCORE: 61
ADLS_ACUITY_SCORE: 61
ADLS_ACUITY_SCORE: 52
ADLS_ACUITY_SCORE: 58
ADLS_ACUITY_SCORE: 52
ADLS_ACUITY_SCORE: 58
ADLS_ACUITY_SCORE: 56
ADLS_ACUITY_SCORE: 58
ADLS_ACUITY_SCORE: 52
ADLS_ACUITY_SCORE: 52
ADLS_ACUITY_SCORE: 58
ADLS_ACUITY_SCORE: 61
ADLS_ACUITY_SCORE: 61
ADLS_ACUITY_SCORE: 52
ADLS_ACUITY_SCORE: 52

## 2024-04-06 NOTE — PLAN OF CARE
Pt here with CIDP flare up. A&O x4. Neuros lower extremity weakness and baseline numbness in bilateral toes. VSS, SBP<180. Regular diet, thin liquids. Takes pills whole. Up with Ax2 SS. PRN Diluadid and Tylenol given for back/neck pain. Sitter at bedside. Pt scoring green on the Aggression Stop Light Tool. PLEX treatment #3 completed this shift. MRI to be completed tonight. Plan continue PLEX treatments. Discharge pending.

## 2024-04-06 NOTE — PROGRESS NOTES
Date & Time: 4/5/24 1900-0730  Orientation/Cognitive: A&Ox4  Mobility Level/Assist Equipment: A 1-2 with Yajaira Steady to the bathroom.  Fall Risk (Y/N): Yes  Behavior Concerns: Green  Pain Management: Complaint of headaches, PRN IV dilaudid given per MAR.  Tele/VS/O2: VSS on RA  ABNL Lab/BG: WBC 14.4, INR 1.34, Darell 8.3  Diet: Regular  Bowel/Bladder: Continent  Skin Concerns: Minimal redness within isma area  Drains/Devices: PICC on L upper arm, CVC on right upper chest and External female catheter.  Tests/Procedures for next shift: MRI cervical spine  Anticipated DC date & active delays: TBD  Other Important Information: Patient might needs to be rescan (MRI) because of interruption. Pt was unable to tolerated MRI scan because she is Claustrophobia. Oral Clonazepam was offered but patient wanted to have IV meds instead. Provider was notified for IV Ativan, but no orders were received.    /76 (BP Location: Right arm, Patient Position: Semi-Ferrara's, Cuff Size: Adult Regular)   Pulse 91   Temp 98.5  F (36.9  C) (Oral)   Resp 18   LMP 07/12/2023   SpO2 97%

## 2024-04-06 NOTE — PROGRESS NOTES
[facility]  Neuroscience and Spine [place]  Neurology Daily Note                   Interval History:   She has remained stable overnight, today she had no specific complaints but has not ambulated since she did with physical therapy on 4/5/2024.  MRI of the cervical spine was attempted on 4/5/2024 but the patient could not tolerate the procedure, this will be repeated with sedation today on 4/6/2024.  Today she will move completed 3 Plex treatment, 5-7 was recommended.    Nevin Alvarado is a 33-year-old patient who has a history for bipolar disorder, general anxiety disorder and depression who has a history for CIDP and had been on followed in [facility],  More recently with [facility] who presented with worsening lower extremity weakness and was admitted on3/30/2024 when she was seen by .  At that time she was also complaining of worsening lower back pain and had MRI of the lumbar spine, and MRI of the thoracic spine done with and without contrast, neither of which showing any acute neural compression or abnormal cord signal, however, there was evidence of slight no nodular enhancement of the nerves of the cauda equina concerning for inflammatory neuropathy.  Patient had had EMG done by general  Clinic neurologist which was said to demonstrate axonal neuropathy although, she has been diagnosed with CIDP in the past and notably had been treated with IVIG and prednisone and the patient reporting that this treatment had not been helpful.  She was initially treated with IV Solu-Medrol, was was subsequently started on Plex and has completed 2 of 5-7 sessions reporting some improvement in strength in her lower extremities.  However, she is now reporting having weakness in the right upper extremity.          Review of Systems:   The 10 point Review of Systems is negative other than noted in the HPI       Medications:   Scheduled Meds:  Current Facility-Administered Medications    Medication Dose Route Frequency Provider Last Rate Last Admin    albumin human 5 % injection 3,250 mL  3,250 mL Apheresis Once BART Puckett MD        Anticoagulant Citrate Dextrose Formula A   500-2,000 mL Apheresis Once BART Puckett MD        calcium gluconate 4 g in sodium chloride 0.9 % 90 mL  4 g Intravenous Once BART Puckett MD        cetirizine (zyrTEC) tablet 10 mg  10 mg Oral At Bedtime Brionna Robertson DO   10 mg at 04/05/24 2030    ferrous sulfate (FEROSUL) tablet 325 mg  325 mg Oral Daily with breakfast Melida Cedillo MD   325 mg at 04/06/24 0914    gadobutrol (GADAVIST) injection 10 mL  10 mL Intravenous Once Shelia Goyal MD        sodium chloride 0.9% DIALYSIS Cath LOCK - BLUE Lumen  1.3-2.6 mL Intracatheter Once Nish Smalls PA-C        Followed by    heparin 1000 unit/mL DIALYSIS Cath LOCK - BLUE Lumen  3 mL Intracatheter Once Nish Smalls PA-C        sodium chloride 0.9% DIALYSIS Cath LOCK - RED Lumen  1.3-2.6 mL Intracatheter Once Nish Smalls PA-C        Followed by    heparin 1000 unit/mL DIALYSIS Cath LOCK - RED Lumen  3 mL Intracatheter Once Nish Smalls PA-C        montelukast (SINGULAIR) tablet 10 mg  10 mg Oral QPM Melida Cedillo MD   10 mg at 04/05/24 2029    norethindrone (AYGESTIN) tablet 5 mg  5 mg Oral Daily Melida Cedillo MD   5 mg at 04/06/24 0913    pantoprazole (PROTONIX) EC tablet 40 mg  40 mg Oral Daily Melida Cedillo MD   40 mg at 04/06/24 0914    pregabalin (LYRICA) capsule 100 mg  100 mg Oral QAM Melida Cedillo MD   100 mg at 04/06/24 0913    pregabalin (LYRICA) capsule 200 mg  200 mg Oral At Bedtime Brionna Robertson DO   200 mg at 04/05/24 2029    psyllium (METAMUCIL/KONSYL) Packet 1 packet  1 packet Oral Daily Melida Cedillo MD   1 packet at 04/06/24 0913    [START ON 4/7/2024] Semaglutide-Weight Management (WEGOVY) pen SOAJ 1 mg  1 mg  Subcutaneous Weekly Brionna Robertson DO        sodium chloride (PF) 0.9% PF flush 3 mL  3 mL Intracatheter Q8H Melida Cedillo MD   3 mL at 04/06/24 0540    vitamin D3 (CHOLECALCIFEROL) tablet 50 mcg  50 mcg Oral Daily Melida Cedillo MD   50 mcg at 04/06/24 0913     PRN Meds:   Current Facility-Administered Medications   Medication Dose Route Frequency Provider Last Rate Last Admin    acetaminophen (TYLENOL) tablet 1,000 mg  1,000 mg Oral Q6H PRN Melida Cedillo MD   1,000 mg at 04/06/24 1117    albuterol (PROVENTIL HFA/VENTOLIN HFA) inhaler  2 puff Inhalation Q6H PRN Melida Cedillo MD        benzonatate (TESSALON) capsule 100 mg  100 mg Oral TID PRN Melida Cedillo MD        calcium carbonate (TUMS) chewable tablet 1,000 mg  1,000 mg Oral 4x Daily PRN Melida Cedillo MD        clonazePAM (klonoPIN) tablet 0.5 mg  0.5 mg Oral Daily PRN Melida Cedillo MD        cyclobenzaprine (FLEXERIL) tablet 10 mg  10 mg Oral TID PRN Melida Cedillo MD   10 mg at 04/06/24 1245    heparin Lock (1000 units/mL High concentration) 2,000-6,000 Units  2-6 mL Intravenous Once PRN Barney Lovell MD        HYDROmorphone (DILAUDID) half-tab 3 mg  3 mg Oral Q4H PRN Shelia Goyal MD   3 mg at 04/06/24 1344    HYDROmorphone (DILAUDID) tablet 2 mg  2 mg Oral Q4H PRN Shelia Goyal MD   2 mg at 04/05/24 1853    HYDROmorphone (PF) (DILAUDID) injection 0.3 mg  0.3 mg Intravenous Q6H PRN Melida Cedillo MD   0.3 mg at 04/06/24 0917    lidocaine (LMX4) cream   Topical Q1H PRN Melida Cedillo MD        lidocaine 1 % 0.1-1 mL  0.1-1 mL Other Q1H PRN Melida Cedillo MD        LORazepam (ATIVAN) injection 1 mg  1 mg Intravenous Once PRN Shelia Goyal MD        naloxone (NARCAN) injection 0.2 mg  0.2 mg Intravenous Q2 Min PRN Andrew Cunningham MD        Or    naloxone (NARCAN) injection 0.4 mg  0.4 mg Intravenous Q2 Min PRN Andrew Cunningham MD        Or    naloxone (NARCAN)  "injection 0.2 mg  0.2 mg Intramuscular Q2 Min PRN Andrew Cunningham MD        Or    naloxone (NARCAN) injection 0.4 mg  0.4 mg Intramuscular Q2 Min PRN Andrew Cunningham MD        ondansetron (ZOFRAN ODT) ODT tab 4 mg  4 mg Oral Q6H PRN Melida Cedillo MD        Or    ondansetron (ZOFRAN) injection 4 mg  4 mg Intravenous Q6H PRN Melida Cedillo MD   4 mg at 04/05/24 0723    polyethylene glycol (MIRALAX) powder 17 g  17 g Oral Daily PRN Melida Cedillo MD        senna-docusate (SENOKOT-S/PERICOLACE) 8.6-50 MG per tablet 1 tablet  1 tablet Oral BID PRN Melida Cedillo MD        Or    senna-docusate (SENOKOT-S/PERICOLACE) 8.6-50 MG per tablet 2 tablet  2 tablet Oral BID PRN Melida Cedillo MD        sodium chloride (PF) 0.9% PF flush 3 mL  3 mL Intracatheter q1 min prn Melida Cedillo MD   3 mL at 04/02/24 0523    sodium chloride 0.9% BOLUS 250 mL  250 mL Intravenous BID PRN BART Puckett MD        sodium chloride 0.9% BOLUS 250 mL  250 mL Intravenous BID PRN Barney Lovell MD               Physical Exam:   Vitals: Blood pressure (!) 145/84, pulse 90, temperature 99.6  F (37.6  C), temperature source Oral, resp. rate 18, height 1.575 m (5' 2\"), weight 114.3 kg (252 lb), last menstrual period 07/12/2023, SpO2 97%, not currently breastfeeding.  General Appearance:    She was in no apparent respiratory distress she was very alert with fluent speech  Neuro:       Mental Status Exam:    She was very alert and with fluent speech       Cranial Nerves:   Cranial nerves II to XII intact       Motor:   Normal tone in all 4 extremities , grasp on the right was 3/5 and on the left 4/5.  Again she demonstrated decreased effort with testing and strength in the lower extremities with minimal movement in the hip flexors at 1-2/5 but there was no weakness of the dorsiflexors or plantar flexors demonstrated where the strength was fairly good at 4+/5 bilaterally.          Reflexes: 2+/4 in the " biceps, 1+/4 in the brachioradialis bilaterally, not elicited at the patella or ankles.       Sensory: Intact except for decreased vibratory sensation in both great toes                       Gait: Deferred due to leg weakness  Cardiovascular: Regular rate and rhythm, no m/r/g  Lungs: Clear to auscultation  Abdomen: Soft, not tender, not destended  Extremities: No clubbing, no cyanosis, no edema       Data:   ROUTINE IP LABS (Last 3results)  CBC RESULTS:     Recent Labs   Lab 04/04/24  0010 04/02/24  1018 03/31/24  0740   WBC 14.1* 7.1 6.6   RBC 3.87 4.31 4.39   HGB 11.6* 13.1 13.4   HCT 34.6* 39.4 40.7    258 298     Basic Metabolic Panel:  Recent Labs   Lab 04/04/24  0557 04/02/24  1018 04/02/24  0518 03/31/24  0740    141  --  140   POTASSIUM 3.8 3.6  --  4.2   CHLORIDE 109* 108*  --  107   CO2 24 20*  --  25   BUN 16.5 12.6  --  15.5   CR 0.62 0.54  --  0.79   * 151* 105* 102*   KELBY 8.3* 8.7  --  9.1     INR:  Recent Labs   Lab 04/04/24  0010   INR 1.34*      Lipid Profile:  Recent Labs   Lab Test 02/11/22  0844 11/30/20  0823   CHOL 132 130   HDL 41* 44*   LDL 38 50   TRIG 266* 182*     TSH:  TSH   Date Value Ref Range Status   06/27/2023 4.47 (H) 0.30 - 4.20 uIU/mL Final   02/11/2022 4.94 (H) 0.40 - 4.00 mU/L Final   11/30/2020 3.19 0.40 - 4.00 mU/L Final   ,   Vitamin B12:   Lab Results   Component Value Date    B12 203 02/11/2022    B12 279 03/21/2018               Latest Reference Range & Units 04/01/24 10:43   RBC CSF 0 - 2 /uL 0   Appearance CSF Clear  Clear   Color CSF Colorless  Colorless   Glucose CSF 40 - 70 mg/dL 60   Protein total CSF 15.0 - 45.0 mg/dL 104.9 (H)   (H): Data is abnormally high     MRI lumbar spine without and with contrast  3/30/2024     No significant neural compression or evidence for central canal stenosis, cauda equina and nerve roots were mildly enlarged in the lower lumbar and sacral exiting root, some of these demonstrating thin  nonnodular enhancement  reflecting chronic inflammatory demyelinating polyneuropathy     MRI thoracic spine 3/30/2024  No significant central canal stenosis, mild multilevel degenerative changes with moderate left foraminal stenosis at T6/T7 and mild to moderate left foraminal stenosis at T7-T8, there were no enhancing lesions and no abnormal cord signal      Assessment and Plan:   This is a 33-year-old patient with a history for CIDP, CSF studies demonstrate albumin/cytological dissociation and the patient presented with worsening lower extremity weakness.  -- She has been assessed by by  who recommended Plex treatment as the patient reportedly has not responded to IVIG in the past.  --- The patient reported some improvement in her lower extremity weakness, and that she had ambulated with physical therapy yesterday.  I agree that there does appear to be some functional component to her examination making it somewhat difficult to have objective evaluation of her motor strength.    Low B12 level 203 and 279, 2018 and 2022 of unclear significance, recommend repeat B12 level and additional laboratory studies, methylmalonic acid and homocystine levels    Recommendations:  -- Continue Plex treatment, 5-7 sessions anticipated, patient has completed 2 and will have the third today, with some signs of improvement.  --- Continue physical and Occupational Therapy.  --- Psychiatric consultation has been recommended by , it is noted that she has already been seen by psychotherapy.   - MRI of the cervical spine with and without contrast will be attempted again today, the patient was unable to tolerate the procedure yesterday without sedation.       Angela Zavala MD  [facility]rology, [facility]  Telephone number [phone]

## 2024-04-06 NOTE — PLAN OF CARE
End of shift report [id number]:  Pt complained of skin irritation due to purewick. Educated pt on going to the bathroom instead of using purewick as purewick is known to cause skin irritation, but pt wants to continue using purewick. Barrier cream utilized, notified NOC RN to possibly try mepilex. Dilaudid given for headache which pt rated 8/10. Pt requested IV Versed for anxiety prior to MRI. Pt did not have any order of versed. Pt did have oral Clonazepam, but she did not want to take it. Discussed this with pt and asked whether she wants to try taking the dilaudid and see if she can tolerate the MRI, or if she would like me to page the doctor for IV anxiety med, but it may be a little bit before we can get that ordered. Pt thought it over and said that she is willing to try the oral dilaudid and do the MRI. Went to MRI during shift change. Handover report given to next shift RN. 1:1 attendant present. Discharge pending safe disposition plan.

## 2024-04-06 NOTE — PROGRESS NOTES
Renal Medicine       Plex Note    Indication:  CIDP    #3 of 5 scheduled    1 volume exchange  3250 5% albumin  4 gram calcium    Machine re strung after 15 minutes   Line issue    Next 04/08/23      Recent Labs   Lab 04/04/24  0557      POTASSIUM 3.8   CHLORIDE 109*   CO2 24   ANIONGAP 9   *   BUN 16.5   CR 0.62   GFRESTIMATED >90   KELBY 8.3*           JODY Puckett    [facility]  [phone]

## 2024-04-06 NOTE — PROGRESS NOTES
MD Notification    Notified Person: MD    Notified Person Name: Daisy Teixeira    Notification Date/Time: 24    Notification Interaction: [employer] - [employer]    Purpose of Notification: Patient in room 717 : [birth year] Patient want to have IV Ativan to use before MRI. She wasn't able to finish MRI scan, just return back to the unit.    Orders Received: None      Comments:

## 2024-04-06 NOTE — PROGRESS NOTES
Treatment in room d 40 using Optia apheresis machine. Consent verified.     Height: 5 ft 2in  Weight: 252kg  HCT: 34%  Inlet speed: 80  ACDA ratio: 10:1  Fluid balance: 100  Access: right cvc     Replacement product: albumin 5% 3250 ml  Electrolyte replacement: Ca Gluconate 4grams in NS - total 90mls, piggybacked into return lines, infused over time of treatment.    Premedications: none     Treatment Notes: The first set up was not working- air in chamber alarm. New set up and new lot number-      Treatment completed as ordered, VSS, see EPIC flowsheet for run data.     Next treatment: Monday 4/8/2024    Royce Jung RN

## 2024-04-06 NOTE — PROGRESS NOTES
[facility]    HOSPITALIST PROGRESS NOTE :   --------------------------------------------------    Date of Admission:  4/1/2024    Cumulative Summary:   Nevin Alvarado is a 33 year old female with complex PMHx which includes hx of CIDP with neuropathy, bipolar disorder, borderline personality disorder, depression, anxiety, chronic pain and history of self-injurious behavior with numerous foreign body ingestion in the past, morbid obesity and ZOHRA. She was initially admitted to [facility] on 3/30/2024 for evaluation of acute lower extremity paresis. Additional workup was pursued including MRI of lumbar spine and an LP. She was seen by general neurology service who recommended treatment with plasma exchange. She was subsequently transferred to [facility] on 4/1/2024 to coordinate plasma exchange treatment.     Assessment & Plan     Acute bilateral lower extremity paralysis dt CIDP flare, s/p PLEX starting 4/2  CIDP (chronic inflammatory demyelinating polyneuropathy) (H) (4/1/2024)  Chronic bilateral LE neuropathy, sec to above    *Has history of CIDP with peripheral neuropathy.  She underwent treatments with IVIG in the past.  *Initially presented to [facility] ED on 3/30/2024 for evaluation of new onset back pain and bilateral lower extremity weakness (mostly experiences numbness, so weakness was a new symptom for her).  She was unable to ambulate.  *In the ED, labs were generally unremarkable.  General neurology was consulted.  She underwent further evaluation with MRI of the thoracic and lumbar spines.  MRI of the thoracic spine was unremarkable.  MRI lumbar spine showed enlargement of the cauda equina nerve roots and enhancement which was suggestive of CIDP per neurology.  She will underwent a lumbar puncture which showed elevated protein and albumin cytologic dissociation which confirmed the diagnosis of CIDP.  She was given 1 g of IV Solu-Medrol and  "transferred to [facility] to initiate plasma exchange. PICC line was placed.  *Tunneled catheter placed per IR on 4/2 and patient underwent her first round of plasma exchange later that day.  04/05/2024: Patient care was assumed, seen and examined, worked with therapies, was able to take couple of the steps with PT but having numbness on anterior aspect of the foot also having new motor weakness in right hand with gripping.  Patient is also complaining of discomfort at the tunneled catheter site, trying to use more Tylenol, does not think that 2 mg of Dilaudid takes care of the pain when it is significantly worse.    -- ongoing management per neurology  -- cont PLEX per nephrology; plan is for treatment every other day, treatment #3 on 4/6, plan is for 5-7 treatments total  -- cont routine neurochecks and fall precautions  -- cont PTA Lyrica (100mg in AM and 200mg HS)  -- PT/OT following, recommending TCU vs ARU  -- As needed Dilaudid 2 mg for moderate pain every 4 hours and 3 mg for severe pain.  -- Neurology has been following, highly appreciate their help  -- MRI of the cervical spine with and without contrast due to new right upper extremity weakness  -- Patient was unable to complete yesterday due to claustrophobia  -- Ativan 1 mg as needed ordered to be given 20 minutes before MRI, dose can be repeated in 1 time if needed.    Anxiety  Depression  Bipolar disorder  Chronic pain   Hx suspected medication seeking behavior  Frequent medical noncompliance  Frequent utilization of healthcare system  *Complicated psychiatric history.  Patient has a guardian and noted ED treatment plan.  She stated to admitting provider that she may be able to come off her guardianship as \"she is doing really well\" and she was planning to move out of her group home and back to an apartment.  *Psychiatry was consulted while she was at [facility], she was seen by DEC counselor while in the ED.  [place] that she was medically " "cleared to discharge once medical issues had stabilized.    -- Continue on Lyrica as above and Klonopin as needed.  --As above, oral Dilaudid has been ordered.     Hx self injurious behavior w/foreign body ingestion  *Hx of 47 endoscopies in last 4 years for foreign body extractions ([place], mickie hair pins, etc)     Chronic abdominal pain  *Noted subacute/chronic issue. Seen at [facility] on 3/29/24 and had normal EGD then left AMA; path report negative for dysplasia, gastritis, or Helicobacter.  *Abx xray 3/31 was nl.   *Chronic and stable on PPI.      Morbid obesity  *Is on Wegovy, reports weight loss of 60 pounds recently. Adamant to receive this medication while in the hospital, was given on 3/31.  *Will continue weekly on Sundays while hospitalized, brought in home supply to use.     ZOHRA  *Brought in home CPAP to use on 4/3.       Clinically Significant Risk Factors                 # Coagulation Defect: INR = 1.34 (Ref range: 0.85 - 1.15) and/or PTT = 29 Seconds (Ref range: 22 - 38 Seconds), will monitor for bleeding           # Severe Obesity: Estimated body mass index is 46.21 kg/m  as calculated from the following:    Height as of 3/31/24: 1.575 m (5' 2\").    Weight as of 3/31/24: 114.6 kg (252 lb 10.4 oz).        # Financial/Environmental Concerns: none       Diet: Regular Diet Adult  Snacks/Supplements Adult: Ensure Max Protein (bariatric); Between Meals    Hazel Catheter: Not present  DVT Prophylaxis: Pneumatic Compression Devices  Code Status: Full Code    The patient's care was discussed with the Bedside Nurse and Patient.    Medical Decision Making       47 MINUTES SPENT BY ME on the date of service doing chart review, history, exam, documentation & further activities per the note.      Disposition Plan   Expecting patient to stay for another 5 to 7 days while patient is undergoing Plex treatment, will follow-up on therapy's recommendation if they would recommend acute rehab versus TCU versus " returning to her group home.     Expected Discharge Date: 04/12/2024        Discharge Comments: Discharge once PLEX treatments complete (plan is for PLEX every other day)     Entered: Shelia Goyal MD 04/06/2024, 8:28 AM       Shelia Goyal MD, MD, [facility]  Text Page (7am - 6pm)    ----------------------------------------------------------------------------------------------------------------------      Interval History     Patient was seen and examined, sitting out of bed, thinks that her right upper extremity strength might be slightly improved, on examination I felt that her  were equal.  Patient told me that she is going to need Ativan to complete her MRI due to history of claustrophobia.  She is planned to undergo next Plex treatment later today.  She is asking if she can go home to get some fresh air with 8 if the staffing is available, discussed with charge nurse bedside nursing will try but it will only be done if staffing is available.    -Data reviewed today: I reviewed all new labs and imaging results over the last 24 hours.    I personally reviewed no images or EKG's today.    Physical Exam   Temp: 98.5  F (36.9  C) Temp src: Oral BP: 119/76 Pulse: 91   Resp: 18 SpO2: 97 % O2 Device: None (Room air)    Vitals:     Vital Signs with Ranges  Temp:  [97.9  F (36.6  C)-98.5  F (36.9  C)] 98.5  F (36.9  C)  Pulse:  [83-91] 91  Resp:  [18] 18  BP: (119-139)/(67-76) 119/76  SpO2:  [96 %-98 %] 97 %  I/O last 3 completed shifts:  In: 660 [P.O.:660]  Out: 900 [Urine:900]    GENERAL: Alert , awake and oriented. NAD. Conversational, appropriate.   HEENT: Normocephalic. EOMI. No icterus or injection. Nares normal.   LUNGS: Clear to auscultation. No dyspnea at rest.   HEART: Regular rate. Extremities perfused.   ABDOMEN: Soft, nontender, and nondistended. Positive bowel sounds.   EXTREMITIES: No LE edema noted.  Bilateral lower extremity weakness, right upper hand weakness with gripping, left upper extremity motor  strength is 5 out of 5.  1/5 bilateral lower extremity strength absent vibration and sensation in bilateral toes.      Medications   Current Facility-Administered Medications   Medication Dose Route Frequency Provider Last Rate Last Admin     Current Facility-Administered Medications   Medication Dose Route Frequency Provider Last Rate Last Admin    cetirizine (zyrTEC) tablet 10 mg  10 mg Oral At Bedtime Brionna Robertson DO   10 mg at 04/05/24 2030    ferrous sulfate (FEROSUL) tablet 325 mg  325 mg Oral Daily with breakfast Melida Cedillo MD   325 mg at 04/05/24 0812    gadobutrol (GADAVIST) injection 10 mL  10 mL Intravenous Once Shelia Goyal MD        sodium chloride 0.9% DIALYSIS Cath LOCK - BLUE Lumen  1.3-2.6 mL Intracatheter Once Nish Smalls PA-C        Followed by    heparin 1000 unit/mL DIALYSIS Cath LOCK - BLUE Lumen  3 mL Intracatheter Once Nish Smalls PA-C        sodium chloride 0.9% DIALYSIS Cath LOCK - RED Lumen  1.3-2.6 mL Intracatheter Once Nish Smalls PA-C        Followed by    heparin 1000 unit/mL DIALYSIS Cath LOCK - RED Lumen  3 mL Intracatheter Once Nish Smalls PA-C        montelukast (SINGULAIR) tablet 10 mg  10 mg Oral QPM Melida Cedillo MD   10 mg at 04/05/24 2029    norethindrone (AYGESTIN) tablet 5 mg  5 mg Oral Daily Melida Cedillo MD   5 mg at 04/05/24 0812    pantoprazole (PROTONIX) EC tablet 40 mg  40 mg Oral Daily Melida Cedillo MD   40 mg at 04/05/24 0813    pregabalin (LYRICA) capsule 100 mg  100 mg Oral QAM Melida Cedillo MD   100 mg at 04/05/24 0812    pregabalin (LYRICA) capsule 200 mg  200 mg Oral At Bedtime Brionna Robertson DO   200 mg at 04/05/24 2029    psyllium (METAMUCIL/KONSYL) Packet 1 packet  1 packet Oral Daily Melida Cedillo MD   1 packet at 04/05/24 1005    [START ON 4/7/2024] Semaglutide-Weight Management (WEGOVY) pen SOAJ 1 mg  1 mg Subcutaneous Weekly  Brionna Robertson,         sodium chloride (PF) 0.9% PF flush 3 mL  3 mL Intracatheter Q8H Melida Cedillo MD   3 mL at 04/06/24 0540    vitamin D3 (CHOLECALCIFEROL) tablet 50 mcg  50 mcg Oral Daily Melida Cedillo MD   50 mcg at 04/05/24 0812       Data   Recent Labs   Lab 04/04/24  0557 04/04/24  0010 04/02/24  1018 04/02/24  0518 03/31/24  0740   WBC  --  14.1* 7.1  --  6.6   HGB  --  11.6* 13.1  --  13.4   MCV  --  89 91  --  93   PLT  --  161 258  --  298   INR  --  1.34*  --   --   --      --  141  --  140   POTASSIUM 3.8  --  3.6  --  4.2   CHLORIDE 109*  --  108*  --  107   CO2 24  --  20*  --  25   BUN 16.5  --  12.6  --  15.5   CR 0.62  --  0.54  --  0.79   ANIONGAP 9  --  13  --  8   KELBY 8.3*  --  8.7  --  9.1   *  --  151* 105* 102*   ALBUMIN 3.5  --   --   --   --        Imaging:   No results found for this or any previous visit (from the past 24 hour(s)).

## 2024-04-07 ENCOUNTER — APPOINTMENT (OUTPATIENT)
Dept: PHYSICAL THERAPY | Facility: CLINIC | Age: 34
DRG: 074 | End: 2024-04-07
Attending: HOSPITALIST
Payer: COMMERCIAL

## 2024-04-07 LAB
ANION GAP SERPL CALCULATED.3IONS-SCNC: 8 MMOL/L (ref 7–15)
BUN SERPL-MCNC: 8.3 MG/DL (ref 6–20)
CALCIUM SERPL-MCNC: 8.8 MG/DL (ref 8.6–10)
CHLORIDE SERPL-SCNC: 109 MMOL/L (ref 98–107)
CREAT SERPL-MCNC: 0.52 MG/DL (ref 0.51–0.95)
DEPRECATED HCO3 PLAS-SCNC: 26 MMOL/L (ref 22–29)
EGFRCR SERPLBLD CKD-EPI 2021: >90 ML/MIN/1.73M2
ERYTHROCYTE [DISTWIDTH] IN BLOOD BY AUTOMATED COUNT: 14.1 % (ref 10–15)
FOLATE SERPL-MCNC: 10 NG/ML (ref 4.6–34.8)
GLUCOSE SERPL-MCNC: 116 MG/DL (ref 70–99)
HCT VFR BLD AUTO: 35.7 % (ref 35–47)
HCYS SERPL-SCNC: 17.1 UMOL/L (ref 0–15)
HGB BLD-MCNC: 11.9 G/DL (ref 11.7–15.7)
MCH RBC QN AUTO: 30.7 PG (ref 26.5–33)
MCHC RBC AUTO-ENTMCNC: 33.3 G/DL (ref 31.5–36.5)
MCV RBC AUTO: 92 FL (ref 78–100)
PLATELET # BLD AUTO: 139 10E3/UL (ref 150–450)
POTASSIUM SERPL-SCNC: 4.1 MMOL/L (ref 3.4–5.3)
RBC # BLD AUTO: 3.87 10E6/UL (ref 3.8–5.2)
SODIUM SERPL-SCNC: 143 MMOL/L (ref 135–145)
VIT B12 SERPL-MCNC: <150 PG/ML (ref 232–1245)
WBC # BLD AUTO: 8 10E3/UL (ref 4–11)

## 2024-04-07 PROCEDURE — 85027 COMPLETE CBC AUTOMATED: CPT | Performed by: INTERNAL MEDICINE

## 2024-04-07 PROCEDURE — 97110 THERAPEUTIC EXERCISES: CPT | Mod: GP

## 2024-04-07 PROCEDURE — 82746 ASSAY OF FOLIC ACID SERUM: CPT | Performed by: PSYCHIATRY & NEUROLOGY

## 2024-04-07 PROCEDURE — 82607 VITAMIN B-12: CPT | Performed by: PSYCHIATRY & NEUROLOGY

## 2024-04-07 PROCEDURE — 82248 BILIRUBIN DIRECT: CPT | Performed by: HOSPITALIST

## 2024-04-07 PROCEDURE — 97116 GAIT TRAINING THERAPY: CPT | Mod: GP

## 2024-04-07 PROCEDURE — 250N000013 HC RX MED GY IP 250 OP 250 PS 637: Performed by: INTERNAL MEDICINE

## 2024-04-07 PROCEDURE — 250N000011 HC RX IP 250 OP 636: Performed by: INTERNAL MEDICINE

## 2024-04-07 PROCEDURE — 83921 ORGANIC ACID SINGLE QUANT: CPT | Performed by: PSYCHIATRY & NEUROLOGY

## 2024-04-07 PROCEDURE — 83090 ASSAY OF HOMOCYSTEINE: CPT | Performed by: PSYCHIATRY & NEUROLOGY

## 2024-04-07 PROCEDURE — 80048 BASIC METABOLIC PNL TOTAL CA: CPT | Performed by: INTERNAL MEDICINE

## 2024-04-07 PROCEDURE — 120N000001 HC R&B MED SURG/OB

## 2024-04-07 PROCEDURE — 99232 SBSQ HOSP IP/OBS MODERATE 35: CPT | Performed by: INTERNAL MEDICINE

## 2024-04-07 RX ADMIN — ACETAMINOPHEN 1000 MG: 500 TABLET, FILM COATED ORAL at 15:41

## 2024-04-07 RX ADMIN — MONTELUKAST 10 MG: 10 TABLET, FILM COATED ORAL at 19:56

## 2024-04-07 RX ADMIN — HYDROMORPHONE HYDROCHLORIDE 3 MG: 2 TABLET ORAL at 11:39

## 2024-04-07 RX ADMIN — ACETAMINOPHEN 1000 MG: 500 TABLET, FILM COATED ORAL at 10:05

## 2024-04-07 RX ADMIN — Medication 50 MCG: at 09:52

## 2024-04-07 RX ADMIN — FERROUS SULFATE TAB 325 MG (65 MG ELEMENTAL FE) 325 MG: 325 (65 FE) TAB at 09:52

## 2024-04-07 RX ADMIN — HYDROMORPHONE HYDROCHLORIDE 0.3 MG: 1 INJECTION, SOLUTION INTRAMUSCULAR; INTRAVENOUS; SUBCUTANEOUS at 15:40

## 2024-04-07 RX ADMIN — PREGABALIN 100 MG: 100 CAPSULE ORAL at 09:52

## 2024-04-07 RX ADMIN — PREGABALIN 200 MG: 100 CAPSULE ORAL at 19:55

## 2024-04-07 RX ADMIN — CETIRIZINE HYDROCHLORIDE 10 MG: 10 TABLET, FILM COATED ORAL at 19:55

## 2024-04-07 RX ADMIN — HYDROMORPHONE HYDROCHLORIDE 2 MG: 2 TABLET ORAL at 06:44

## 2024-04-07 RX ADMIN — HYDROMORPHONE HYDROCHLORIDE 0.3 MG: 1 INJECTION, SOLUTION INTRAMUSCULAR; INTRAVENOUS; SUBCUTANEOUS at 22:03

## 2024-04-07 RX ADMIN — PANTOPRAZOLE SODIUM 40 MG: 40 TABLET, DELAYED RELEASE ORAL at 09:52

## 2024-04-07 RX ADMIN — PSYLLIUM HUSK 1 PACKET: 3.4 POWDER ORAL at 09:52

## 2024-04-07 RX ADMIN — NORETHINDRONE ACETATE 5 MG: 5 TABLET ORAL at 09:52

## 2024-04-07 ASSESSMENT — ACTIVITIES OF DAILY LIVING (ADL)
ADLS_ACUITY_SCORE: 56
ADLS_ACUITY_SCORE: 61
ADLS_ACUITY_SCORE: 60
ADLS_ACUITY_SCORE: 61
ADLS_ACUITY_SCORE: 56
ADLS_ACUITY_SCORE: 61
ADLS_ACUITY_SCORE: 56
ADLS_ACUITY_SCORE: 61
ADLS_ACUITY_SCORE: 57
ADLS_ACUITY_SCORE: 57
ADLS_ACUITY_SCORE: 60
ADLS_ACUITY_SCORE: 61
ADLS_ACUITY_SCORE: 53
ADLS_ACUITY_SCORE: 57
ADLS_ACUITY_SCORE: 57
ADLS_ACUITY_SCORE: 61
ADLS_ACUITY_SCORE: 56
ADLS_ACUITY_SCORE: 56
ADLS_ACUITY_SCORE: 57
ADLS_ACUITY_SCORE: 56
ADLS_ACUITY_SCORE: 61
ADLS_ACUITY_SCORE: 60
ADLS_ACUITY_SCORE: 61

## 2024-04-07 NOTE — PROGRESS NOTES
Renal Medicine Chart Check      Plex #4 04/08/24      JODY Puckett    [facility]  [phone]

## 2024-04-07 NOTE — PROVIDER NOTIFICATION
MD Notification    Notified Person: MD    Notified Person Name: Vijay Teixeira MD    Notification Date/Time: 4/6/24    Notification Interaction: Sophy    Purpose of Notification:     Pt is having a 8/10 headache. Prn dilaudid already given. She is refusing all other prns and is requesting to get prn Compazine and benadryl.     Orders Received: prn compazine and bendryl ordered

## 2024-04-07 NOTE — PROGRESS NOTES
[facility]    HOSPITALIST PROGRESS NOTE :   --------------------------------------------------    Date of Admission:  4/1/2024    Cumulative Summary:   Nevin Alvarado is a 33 year old female with complex PMHx which includes hx of CIDP with neuropathy, bipolar disorder, borderline personality disorder, depression, anxiety, chronic pain and history of self-injurious behavior with numerous foreign body ingestion in the past, morbid obesity and ZOHRA. She was initially admitted to [facility] on 3/30/2024 for evaluation of acute lower extremity paresis. Additional workup was pursued including MRI of lumbar spine and an LP. She was seen by general neurology service who recommended treatment with plasma exchange. She was subsequently transferred to [facility] on 4/1/2024 to coordinate plasma exchange treatment.     Assessment & Plan     Acute bilateral lower extremity paralysis dt CIDP flare, s/p PLEX starting 4/2  CIDP (chronic inflammatory demyelinating polyneuropathy) (H) (4/1/2024)  Chronic bilateral LE neuropathy, sec to above    *Has history of CIDP with peripheral neuropathy.  She underwent treatments with IVIG in the past.  *Initially presented to [facility] ED on 3/30/2024 for evaluation of new onset back pain and bilateral lower extremity weakness (mostly experiences numbness, so weakness was a new symptom for her).  She was unable to ambulate.  *In the ED, labs were generally unremarkable.  General neurology was consulted.  She underwent further evaluation with MRI of the thoracic and lumbar spines.  MRI of the thoracic spine was unremarkable.  MRI lumbar spine showed enlargement of the cauda equina nerve roots and enhancement which was suggestive of CIDP per neurology.  She will underwent a lumbar puncture which showed elevated protein and albumin cytologic dissociation which confirmed the diagnosis of CIDP.  She was given 1 g of IV Solu-Medrol and  "transferred to [facility] to initiate plasma exchange. PICC line was placed.  *Tunneled catheter placed per IR on 4/2 and patient underwent her first round of plasma exchange later that day.  04/05: P.o. Dilaudid dosage was adjusted due to pain at catheter site.  Cervical spine MRI ordered per neurology was not completed due to claustrophobia  04/06: MRI completed with IV Ativan, underwent third Plex treatment    -- ongoing management per neurology  -- Continue PLEX per nephrology; plan for treatment every other day, treatment #3 on 4/6, plan is for 5-7 treatments total  -- Continue routine neurochecks and fall precautions  -- Continue PTA Lyrica (100mg in AM and 200mg HS)  -- PT/OT following, recommending TCU vs ARU  -- As needed Dilaudid 2 mg for moderate pain every 4 hours and 3 mg for severe pain.  -- Neurology has been following, highly appreciate their help  -- MRI of the cervical spine with and without contrast completed last evening 04/06 due to new right upper extremity weakness  --MRI showing no abnormal signal or enhancement within the cervical cord.  DDD changes at C5-C6 levels were noticed.    Anxiety  Depression  Bipolar disorder  Chronic pain   Hx suspected medication seeking behavior  Frequent medical noncompliance  Frequent utilization of healthcare system  *Complicated psychiatric history.  Patient has a guardian and noted ED treatment plan.  She stated to admitting provider that she may be able to come off her guardianship as \"she is doing really well\" and she was planning to move out of her group home and back to an apartment.  *Psychiatry was consulted while she was at [facility], she was seen by DEC counselor while in the ED.  [place] that she was medically cleared to discharge once medical issues had stabilized.    -- Continue on Lyrica as above and Klonopin as needed.  -- As above, oral Dilaudid has been ordered.  --Consideration for reevaluation by psychiatry can be given on " "Monday, 04/08/2024     Hx self injurious behavior w/foreign body ingestion  *Hx of 47 endoscopies in last 4 years for foreign body extractions ([place], mickie hair pins, etc)     Chronic abdominal pain  *Noted subacute/chronic issue. Seen at [facility] on 3/29/24 and had normal EGD then left AMA; path report negative for dysplasia, gastritis, or Helicobacter.  *Abx xray 3/31 was nl.   *Chronic and stable on PPI.      Morbid obesity  *Is on Wegovy, reports weight loss of 60 pounds recently. Adamant to receive this medication while in the hospital, was given on 3/31.  *Will continue weekly on Sundays while hospitalized, brought in home supply to use.     ZOHRA  *Brought in home CPAP to use on 4/3.       Clinically Significant Risk Factors                           # Severe Obesity: Estimated body mass index is 46.09 kg/m  as calculated from the following:    Height as of this encounter: 1.575 m (5' 2\").    Weight as of this encounter: 114.3 kg (252 lb).        # Financial/Environmental Concerns: none       Diet: Regular Diet Adult  Snacks/Supplements Adult: Ensure Max Protein (bariatric); Between Meals    Hazel Catheter: Not present  DVT Prophylaxis: Pneumatic Compression Devices  Code Status: Full Code    The patient's care was discussed with the Bedside Nurse and Patient.    Medical Decision Making       44 MINUTES SPENT BY ME on the date of service doing chart review, history, exam, documentation & further activities per the note.      Disposition Plan   Expecting patient to stay for another 5 to 7 days while patient is undergoing Plex treatment, will follow-up on therapy's recommendation if they would recommend acute rehab versus TCU versus returning to her group home.     Expected Discharge Date: 04/12/2024        Discharge Comments: Discharge once PLEX treatments complete (plan is for PLEX every other day)     Entered: Shelia Goyal MD 04/07/2024, 8:00 AM       Shelia Goyal MD, MD, FACP  Text Page (7am - " 6pm)    ----------------------------------------------------------------------------------------------------------------------      Interval History     Patient was seen and examined, lying in bed.  Told me that she is able to wiggle her toe and lower extremities, thinks that her upper extremity strength is a stable.  Patient is denying any chest pain, palpitations or shortness of breath.  Continues to have discomfort at the catheter site, discussed with patient that currently pain medications are ordered but hopefully once she is done with Plex treatment then catheter can be removed.  Patient showed understanding, was concerned about significant urine output over the night, discussed with patient and bedside nursing that we will continue to monitor.  Creatinine and sodium remains in normal range.    -Data reviewed today: I reviewed all new labs and imaging results over the last 24 hours.    I personally reviewed no images or EKG's today.    Physical Exam   Temp: 98.8  F (37.1  C) Temp src: Oral BP: (!) 140/76 Pulse: 87   Resp: 17 SpO2: 97 % O2 Device: None (Room air)    Vitals:    04/06/24 1315   Weight: 114.3 kg (252 lb)     Vital Signs with Ranges  Temp:  [97.8  F (36.6  C)-99.6  F (37.6  C)] 98.8  F (37.1  C)  Pulse:  [] 87  Resp:  [15-28] 17  BP: ()/(50-84) 140/76  SpO2:  [97 %-98 %] 97 %  I/O last 3 completed shifts:  In: 2439 [P.O.:2425; I.V.:14]  Out: 2626 [Urine:2626]    GENERAL: Alert , awake and oriented. NAD. Conversational, appropriate.   HEENT: Normocephalic. EOMI. No icterus or injection. Nares normal.   LUNGS: Clear to auscultation. No dyspnea at rest.   HEART: Regular rate. Extremities perfused.   ABDOMEN: Soft, nontender, and nondistended. Positive bowel sounds.   EXTREMITIES: No LE edema noted.  Bilateral lower extremity weakness, right upper hand weakness with gripping, left upper extremity motor strength is 5 out of 5.  1/5 bilateral lower extremity strength absent vibration and  sensation in bilateral toes.      Medications   Current Facility-Administered Medications   Medication Dose Route Frequency Provider Last Rate Last Admin     Current Facility-Administered Medications   Medication Dose Route Frequency Provider Last Rate Last Admin    cetirizine (zyrTEC) tablet 10 mg  10 mg Oral At Bedtime Brionna Robertson DO   10 mg at 04/06/24 2107    ferrous sulfate (FEROSUL) tablet 325 mg  325 mg Oral Daily with breakfast Melida Cedillo MD   325 mg at 04/06/24 0914    montelukast (SINGULAIR) tablet 10 mg  10 mg Oral QPM Melida Cedillo MD   10 mg at 04/06/24 2107    norethindrone (AYGESTIN) tablet 5 mg  5 mg Oral Daily Melida Cedillo MD   5 mg at 04/06/24 0913    pantoprazole (PROTONIX) EC tablet 40 mg  40 mg Oral Daily Melida Cedillo MD   40 mg at 04/06/24 0914    pregabalin (LYRICA) capsule 100 mg  100 mg Oral QAM Melida Cedillo MD   100 mg at 04/06/24 0913    pregabalin (LYRICA) capsule 200 mg  200 mg Oral At Bedtime Brionna Robertson DO   200 mg at 04/06/24 2107    psyllium (METAMUCIL/KONSYL) Packet 1 packet  1 packet Oral Daily Melida Cedillo MD   1 packet at 04/06/24 0913    Semaglutide-Weight Management (WEGOVY) pen SOAJ 1 mg  1 mg Subcutaneous Weekly Brionna Robertson DO        sodium chloride (PF) 0.9% PF flush 3 mL  3 mL Intracatheter Q8H Melida Cedillo MD   3 mL at 04/06/24 2108    vitamin D3 (CHOLECALCIFEROL) tablet 50 mcg  50 mcg Oral Daily Melida Cedillo MD   50 mcg at 04/06/24 0913       Data   Recent Labs   Lab 04/07/24  0638 04/04/24  0557 04/04/24  0010 04/02/24  1018   WBC 8.0  --  14.1* 7.1   HGB 11.9  --  11.6* 13.1   MCV 92  --  89 91   *  --  161 258   INR  --   --  1.34*  --     142  --  141   POTASSIUM 4.1 3.8  --  3.6   CHLORIDE 109* 109*  --  108*   CO2 26 24  --  20*   BUN 8.3 16.5  --  12.6   CR 0.52 0.62  --  0.54   ANIONGAP 8 9  --  13   KELBY 8.8 8.3*  --  8.7   * 117*   --  151*   ALBUMIN  --  3.5  --   --        Imaging:   Recent Results (from the past 24 hour(s))   MR Cervical Spine w/o & w Contrast    Narrative    EXAM: MR CERVICAL SPINE W/O and W CONTRAST  LOCATION: [facility]  DATE: 4/6/2024    INDICATION: Progressive right upper extremity weakness, with a history for CIDP and lower extremity weakness  COMPARISON: 04/05/2024. 02/24/2021.  CONTRAST: 10mL Gadavist  TECHNIQUE: MRI Cervical Spine without and with IV contrast.    FINDINGS:   No abnormal signal or enhancement within the cervical cord. The epidural spaces clear. The marrow signal throughout the cervical vertebrae and upper portion of the thoracic vertebral bodies is normal.    Paraspinous soft tissues are clear.    Craniovertebral junction and C1-C2: Normal.    C2-C3: Normal disc height. No herniation. Normal facets. No spinal canal or neural foraminal stenosis.     C3-C4: Normal disc height. No herniation. Normal facets. No spinal canal or neural foraminal stenosis.     C4-C5: Normal disc height. No herniation. Normal facets. No spinal canal or neural foraminal stenosis.     C5-C6: Slight loss of disc height. Shallow chronic central/right paracentral disc bulge which attenuates the anterior/right aspect of the subarachnoid space and causes subtle contact of the anterior/right aspect of the cervical cord as seen on previous   MRI scan of 02/24/2021.     C6-C7: Normal disc height. No herniation. Normal facets. No spinal canal or neural foraminal stenosis.     C7-T1: Normal disc height. No herniation. Normal facets. No spinal canal or neural foraminal stenosis.      Impression    IMPRESSION:  1.  No abnormal signal or enhancement within the cervical cord.    2.  DDD change at C5-C6 with loss of disc height. Shallow chronic central/right paracentral disc bulge at C5-C6 results in subtle attenuation of the anterior/right aspect of the subarachnoid space and causes subtle contact of the  anterior/right aspect of   the cervical cord as seen on previous MRI scan performed 02/24/2021.

## 2024-04-07 NOTE — PLAN OF CARE
Goal Outcome Evaluation:      Plan of Care Reviewed With: patient    Overall Patient Progress: no changeOverall Patient Progress: no change       Cognitive/Mentation: A/Ox 4  Neuros/CMS: Intact ex BLE weakness & numbness  VS: HTN.   GI: Last BM 4/7/24. Continent.  : External cath. Continent.  Pulmonary: LS clear.  Pain: positional TARIQ--Dr. Zvaala aware during rounds & will address. Tylenol & PO dilaudid given. Back pain post PT session--IV dilaudid given x1.     Drains/Lines: tunneled cath & L PICC  Activity: Assist x 2 with mabel gordon.  Diet: Regular with thin liquids. Takes pills whole.     Therapies recs: ARU    Aggression Stoplight Tool: green    End of shift summary: sitter at bedside, 4th PLEX tomorrow.

## 2024-04-07 NOTE — PLAN OF CARE
Pt here with CIPD flare up. A&Ox4. Neuros intact except for BLE weakness 2/5 and numbness on toes at baseline. BLE edema- elevated with pillows. VSS on RA, SBP<180. regular diet, thin liquids. Takes pills whole. Up with 2 mabel steady. BM overnight. Purewick in place. Pt had a high amount of urine output this morning. Pt is complaining of pain in head and back- prn dilaudid and cold packs given. Compazine and benadryl given. PRN ativan given for MRI - completed. Sitter at bedside Plan to continue PLEX treatments. Discharge pending.

## 2024-04-08 ENCOUNTER — APPOINTMENT (OUTPATIENT)
Dept: ULTRASOUND IMAGING | Facility: CLINIC | Age: 34
DRG: 074 | End: 2024-04-08
Attending: HOSPITALIST
Payer: COMMERCIAL

## 2024-04-08 ENCOUNTER — APPOINTMENT (OUTPATIENT)
Dept: OCCUPATIONAL THERAPY | Facility: CLINIC | Age: 34
DRG: 074 | End: 2024-04-08
Attending: HOSPITALIST
Payer: COMMERCIAL

## 2024-04-08 ENCOUNTER — APPOINTMENT (OUTPATIENT)
Dept: MRI IMAGING | Facility: CLINIC | Age: 34
DRG: 074 | End: 2024-04-08
Attending: PSYCHIATRY & NEUROLOGY
Payer: COMMERCIAL

## 2024-04-08 ENCOUNTER — APPOINTMENT (OUTPATIENT)
Dept: PHYSICAL THERAPY | Facility: CLINIC | Age: 34
DRG: 074 | End: 2024-04-08
Attending: HOSPITALIST
Payer: COMMERCIAL

## 2024-04-08 LAB
ALBUMIN SERPL BCG-MCNC: 4.2 G/DL (ref 3.5–5.2)
ALP SERPL-CCNC: 37 U/L (ref 40–150)
ALT SERPL W P-5'-P-CCNC: 14 U/L (ref 0–50)
APTT PPP: 31 SECONDS (ref 22–38)
AST SERPL W P-5'-P-CCNC: 19 U/L (ref 0–45)
BILIRUB DIRECT SERPL-MCNC: <0.2 MG/DL (ref 0–0.3)
BILIRUB SERPL-MCNC: 0.4 MG/DL
FIBRINOGEN PPP-MCNC: 298 MG/DL (ref 170–490)
INR PPP: 1.23 (ref 0.85–1.15)
PROT SERPL-MCNC: 5.5 G/DL (ref 6.4–8.3)

## 2024-04-08 PROCEDURE — P9045 ALBUMIN (HUMAN), 5%, 250 ML: HCPCS | Mod: JZ | Performed by: INTERNAL MEDICINE

## 2024-04-08 PROCEDURE — 99233 SBSQ HOSP IP/OBS HIGH 50: CPT | Performed by: HOSPITALIST

## 2024-04-08 PROCEDURE — 97535 SELF CARE MNGMENT TRAINING: CPT | Mod: GO

## 2024-04-08 PROCEDURE — 250N000011 HC RX IP 250 OP 636: Performed by: PSYCHIATRY & NEUROLOGY

## 2024-04-08 PROCEDURE — 250N000013 HC RX MED GY IP 250 OP 250 PS 637: Performed by: INTERNAL MEDICINE

## 2024-04-08 PROCEDURE — 250N000011 HC RX IP 250 OP 636: Performed by: INTERNAL MEDICINE

## 2024-04-08 PROCEDURE — 120N000001 HC R&B MED SURG/OB

## 2024-04-08 PROCEDURE — 99232 SBSQ HOSP IP/OBS MODERATE 35: CPT | Performed by: INTERNAL MEDICINE

## 2024-04-08 PROCEDURE — 97116 GAIT TRAINING THERAPY: CPT | Mod: GP

## 2024-04-08 PROCEDURE — 85384 FIBRINOGEN ACTIVITY: CPT | Performed by: INTERNAL MEDICINE

## 2024-04-08 PROCEDURE — 85610 PROTHROMBIN TIME: CPT | Performed by: INTERNAL MEDICINE

## 2024-04-08 PROCEDURE — A9585 GADOBUTROL INJECTION: HCPCS | Performed by: INTERNAL MEDICINE

## 2024-04-08 PROCEDURE — 250N000013 HC RX MED GY IP 250 OP 250 PS 637: Performed by: HOSPITALIST

## 2024-04-08 PROCEDURE — 97530 THERAPEUTIC ACTIVITIES: CPT | Mod: GP

## 2024-04-08 PROCEDURE — 999N000040 HC STATISTIC CONSULT NO CHARGE VASC ACCESS

## 2024-04-08 PROCEDURE — 255N000002 HC RX 255 OP 636: Performed by: INTERNAL MEDICINE

## 2024-04-08 PROCEDURE — 70553 MRI BRAIN STEM W/O & W/DYE: CPT

## 2024-04-08 PROCEDURE — 93970 EXTREMITY STUDY: CPT

## 2024-04-08 PROCEDURE — 250N000011 HC RX IP 250 OP 636: Performed by: HOSPITALIST

## 2024-04-08 PROCEDURE — 258N000003 HC RX IP 258 OP 636: Performed by: INTERNAL MEDICINE

## 2024-04-08 PROCEDURE — 97530 THERAPEUTIC ACTIVITIES: CPT | Mod: GO

## 2024-04-08 PROCEDURE — 85730 THROMBOPLASTIN TIME PARTIAL: CPT | Performed by: INTERNAL MEDICINE

## 2024-04-08 PROCEDURE — 250N000009 HC RX 250: Performed by: INTERNAL MEDICINE

## 2024-04-08 RX ORDER — CAFFEINE 200 MG
200 TABLET ORAL DAILY
Status: DISCONTINUED | OUTPATIENT
Start: 2024-04-08 | End: 2024-04-10

## 2024-04-08 RX ORDER — ALBUMIN HUMAN 25 %
3000 INTRAVENOUS SOLUTION INTRAVENOUS ONCE
Status: COMPLETED | OUTPATIENT
Start: 2024-04-08 | End: 2024-04-08

## 2024-04-08 RX ORDER — GADOBUTROL 604.72 MG/ML
11 INJECTION INTRAVENOUS ONCE
Status: COMPLETED | OUTPATIENT
Start: 2024-04-08 | End: 2024-04-08

## 2024-04-08 RX ORDER — CYANOCOBALAMIN 1000 UG/ML
1000 INJECTION, SOLUTION INTRAMUSCULAR; SUBCUTANEOUS
Status: DISCONTINUED | OUTPATIENT
Start: 2024-04-08 | End: 2024-04-08

## 2024-04-08 RX ORDER — ENOXAPARIN SODIUM 100 MG/ML
40 INJECTION SUBCUTANEOUS EVERY 12 HOURS
Status: DISCONTINUED | OUTPATIENT
Start: 2024-04-08 | End: 2024-04-16 | Stop reason: HOSPADM

## 2024-04-08 RX ORDER — CYANOCOBALAMIN 1000 UG/ML
1000 INJECTION, SOLUTION INTRAMUSCULAR; SUBCUTANEOUS ONCE
Status: COMPLETED | OUTPATIENT
Start: 2024-04-08 | End: 2024-04-08

## 2024-04-08 RX ORDER — LORAZEPAM 0.5 MG/1
0.5 TABLET ORAL EVERY 4 HOURS PRN
Status: DISCONTINUED | OUTPATIENT
Start: 2024-04-08 | End: 2024-04-16 | Stop reason: HOSPADM

## 2024-04-08 RX ADMIN — Medication 50 MCG: at 08:15

## 2024-04-08 RX ADMIN — NORETHINDRONE ACETATE 5 MG: 5 TABLET ORAL at 08:14

## 2024-04-08 RX ADMIN — FERROUS SULFATE TAB 325 MG (65 MG ELEMENTAL FE) 325 MG: 325 (65 FE) TAB at 08:15

## 2024-04-08 RX ADMIN — HYDROMORPHONE HYDROCHLORIDE 0.3 MG: 1 INJECTION, SOLUTION INTRAMUSCULAR; INTRAVENOUS; SUBCUTANEOUS at 17:03

## 2024-04-08 RX ADMIN — ALBUMIN HUMAN 3000 ML: 0.05 INJECTION, SOLUTION INTRAVENOUS at 08:56

## 2024-04-08 RX ADMIN — GADOBUTROL 11 ML: 604.72 INJECTION INTRAVENOUS at 23:05

## 2024-04-08 RX ADMIN — PANTOPRAZOLE SODIUM 40 MG: 40 TABLET, DELAYED RELEASE ORAL at 08:15

## 2024-04-08 RX ADMIN — PSYLLIUM HUSK 1 PACKET: 3.4 POWDER ORAL at 08:15

## 2024-04-08 RX ADMIN — ENOXAPARIN SODIUM 40 MG: 40 INJECTION SUBCUTANEOUS at 20:47

## 2024-04-08 RX ADMIN — PREGABALIN 100 MG: 100 CAPSULE ORAL at 08:15

## 2024-04-08 RX ADMIN — MONTELUKAST 10 MG: 10 TABLET, FILM COATED ORAL at 20:47

## 2024-04-08 RX ADMIN — HYDROMORPHONE HYDROCHLORIDE 0.3 MG: 1 INJECTION, SOLUTION INTRAMUSCULAR; INTRAVENOUS; SUBCUTANEOUS at 06:05

## 2024-04-08 RX ADMIN — HYDROMORPHONE HYDROCHLORIDE 3 MG: 2 TABLET ORAL at 11:03

## 2024-04-08 RX ADMIN — HYDROMORPHONE HYDROCHLORIDE 0.3 MG: 1 INJECTION, SOLUTION INTRAMUSCULAR; INTRAVENOUS; SUBCUTANEOUS at 23:30

## 2024-04-08 RX ADMIN — ACETAMINOPHEN 1000 MG: 500 TABLET, FILM COATED ORAL at 09:45

## 2024-04-08 RX ADMIN — Medication 200 MG: at 13:35

## 2024-04-08 RX ADMIN — PREGABALIN 200 MG: 100 CAPSULE ORAL at 20:47

## 2024-04-08 RX ADMIN — ANTICOAGULANT CITRATE DEXTROSE SOLUTION FORMULA A 1000 ML: 12.25; 11; 3.65 SOLUTION INTRAVENOUS at 08:57

## 2024-04-08 RX ADMIN — CALCIUM GLUCONATE 3 G: 98 INJECTION, SOLUTION INTRAVENOUS at 08:56

## 2024-04-08 RX ADMIN — ACETAMINOPHEN 1000 MG: 500 TABLET, FILM COATED ORAL at 18:35

## 2024-04-08 RX ADMIN — LORAZEPAM 0.5 MG: 0.5 TABLET ORAL at 22:04

## 2024-04-08 RX ADMIN — CETIRIZINE HYDROCHLORIDE 10 MG: 10 TABLET, FILM COATED ORAL at 21:03

## 2024-04-08 RX ADMIN — CYANOCOBALAMIN 1000 MCG: 1000 INJECTION, SOLUTION INTRAMUSCULAR at 13:34

## 2024-04-08 ASSESSMENT — ACTIVITIES OF DAILY LIVING (ADL)
ADLS_ACUITY_SCORE: 61
ADLS_ACUITY_SCORE: 60
ADLS_ACUITY_SCORE: 60
ADLS_ACUITY_SCORE: 59
ADLS_ACUITY_SCORE: 61
ADLS_ACUITY_SCORE: 59
ADLS_ACUITY_SCORE: 61
ADLS_ACUITY_SCORE: 60
ADLS_ACUITY_SCORE: 61
ADLS_ACUITY_SCORE: 59
ADLS_ACUITY_SCORE: 60
ADLS_ACUITY_SCORE: 61
ADLS_ACUITY_SCORE: 61

## 2024-04-08 NOTE — PROGRESS NOTES
Renal Medicine Progress Note            Assessment/Plan:     Plex Note     Indication:  CIDP     1 volume exchange with 5% albumin    Plan:  # 5/5 apheresis treatment on Wednesday unless neurology would like to go continue. Please let us know if you would like us to continue for 7 treatment. I reviewed notes from Dr. Torres and Dr. Ramirez        Interval History:     Patient had an uneventful 4th apheresis treatment.   She had many questions, which I answered.   She says she is able to move her legs and toes, but still has significant weakness.            Medications and Allergies:     Current Facility-Administered Medications   Medication Dose Route Frequency Provider Last Rate Last Admin    caffeine (NO-DOZE) tablet 200 mg  200 mg Oral Daily Xena Ramirez MD   200 mg at 04/08/24 1335    cetirizine (zyrTEC) tablet 10 mg  10 mg Oral At Bedtime Brionna Robertson DO   10 mg at 04/07/24 1955    ferrous sulfate (FEROSUL) tablet 325 mg  325 mg Oral Daily with breakfast Melida Cedillo MD   325 mg at 04/08/24 0815    montelukast (SINGULAIR) tablet 10 mg  10 mg Oral QPM Melida Cedillo MD   10 mg at 04/07/24 1956    norethindrone (AYGESTIN) tablet 5 mg  5 mg Oral Daily Melida Cedillo MD   5 mg at 04/08/24 0814    pantoprazole (PROTONIX) EC tablet 40 mg  40 mg Oral Daily Melida Cedillo MD   40 mg at 04/08/24 0815    pregabalin (LYRICA) capsule 100 mg  100 mg Oral QAM Melida Cedillo MD   100 mg at 04/08/24 0815    pregabalin (LYRICA) capsule 200 mg  200 mg Oral At Bedtime Brionna Robertson DO   200 mg at 04/07/24 1955    psyllium (METAMUCIL/KONSYL) Packet 1 packet  1 packet Oral Daily Melida Cedillo MD   1 packet at 04/08/24 0815    Semaglutide-Weight Management (WEGOVY) pen SOAJ 1 mg  1 mg Subcutaneous Weekly Brionna Robertson DO   1 mg at 04/07/24 0949    sodium chloride (PF) 0.9% PF flush 3 mL  3 mL Intracatheter Q8H Melida Cedillo MD   3  mL at 04/08/24 1334    vitamin D3 (CHOLECALCIFEROL) tablet 50 mcg  50 mcg Oral Daily Melida Cedillo MD   50 mcg at 04/08/24 0815        Allergies   Allergen Reactions    Amoxicillin-Pot Clavulanate Other (See Comments), Swelling and Rash     PN: facial swelling, left side. Also had cortisone injection the same day as she started the Augmentin.          Influenza Vaccines Other (See Comments) and Nausea and Vomiting     HUT Reaction: Nausea And Vomiting; HUT Reaction Type: Intolerance; HUT Severity: Low; HUT Noted: [id number]    Latex Rash           Oseltamivir Hives    Penicillins Anaphylaxis    Vancomycin Itching, Swelling and Rash     Flushed face, very itchy    Hydrocodone Nausea and Vomiting and GI Disturbance     vomiting for days    Blood-Group Specific Substance Other (See Comments)     Patient has an anti-Cw and non-specific antibodies. Blood product orders may be delayed. Draw one red top and two purple top tubes for all type/screen/crossmatch orders.  Patient has anti-Cw, anti-K (Angella), Warm auto and nonspecific antibodies. Blood products may be delayed. Draw patient 24 hours prior to transfusion. Draw one red top and two purple top tubes for all type and screen orders.    Clavulanic Acid Angioedema    Haemophilus B Polysaccharide Vaccine Nausea and Vomiting    Oxycodone Swelling    Adhesive Tape Rash     Silicone type  Adhesive allergy      Band-Aid Anti-Itch      Other reaction(s): adhesive allergy    Cephalosporins Rash    Fentanyl Itching    Lamotrigine Rash     Possibly associated with Lamictal.     Naltrexone Other (See Comments)     Reaction(s): Vivid dreams.    No Clinical Screening - See Comments Other (See Comments)     History of swallowing sharp metallic objects. She should not be prescribed lancets due to posed risk of swallowing.             Physical Exam:   Vitals were reviewed   , Blood pressure 111/66, pulse 97, temperature 98.3  F (36.8  C), temperature source Oral, resp. rate 17,  "height 1.575 m (5' 2\"), weight 114.3 kg (252 lb), last menstrual period 07/12/2023, SpO2 97%, not currently breastfeeding.    Wt Readings from Last 3 Encounters:   04/06/24 114.3 kg (252 lb)   03/31/24 114.6 kg (252 lb 10.4 oz)   03/29/24 112.9 kg (249 lb)       Intake/Output Summary (Last 24 hours) at 4/8/2024 1346  Last data filed at 4/8/2024 1113  Gross per 24 hour   Intake 249 ml   Output 2900 ml   Net -2651 ml       GENERAL APPEARANCE: pleasant, NAD, alert  HEENT:  Eyes/ears/nose/neck grossly normal  RESP: Not labored  ABDOMEN: obese, soft.   Neuro: Awake, alert and conversing normally  R TDC CDI         Data:     CBC RESULTS:     Recent Labs   Lab 04/07/24  0638 04/04/24  0010 04/02/24  1018   WBC 8.0 14.1* 7.1   RBC 3.87 3.87 4.31   HGB 11.9 11.6* 13.1   HCT 35.7 34.6* 39.4   * 161 258       Basic Metabolic Panel:  Recent Labs   Lab 04/07/24  0638 04/04/24  0557 04/02/24  1018 04/02/24  0518    142 141  --    POTASSIUM 4.1 3.8 3.6  --    CHLORIDE 109* 109* 108*  --    CO2 26 24 20*  --    BUN 8.3 16.5 12.6  --    CR 0.52 0.62 0.54  --    * 117* 151* 105*   KELBY 8.8 8.3* 8.7  --        INR  Recent Labs   Lab 04/08/24  0604 04/04/24  0010   INR 1.23* 1.34*      Attestation:   I have reviewed today's relevant vital signs, notes, medications, labs and imaging.    Jonh Messina MD  [facility] - Nephrology  Office phone :[phone]  Pager: [phone]   "

## 2024-04-08 NOTE — PROGRESS NOTES
Neurology Progress Note      Subjective    She had her 4th PLEX treatment today.  She has been feeling like her strength is improving, at least a little bit.  She has been dealing with headaches since the spinal tap.  Laying flat does make her feel better.  But she also did not notice such a significant worsening when up with PT before.      Medications:    Current Facility-Administered Medications   Medication Dose Route Frequency Provider Last Rate Last Admin    acetaminophen (TYLENOL) tablet 1,000 mg  1,000 mg Oral Q6H PRN Melida Cedillo MD   1,000 mg at 04/08/24 0945    albuterol (PROVENTIL HFA/VENTOLIN HFA) inhaler  2 puff Inhalation Q6H PRN Melida Cedillo MD        benzonatate (TESSALON) capsule 100 mg  100 mg Oral TID PRN Melida Cedillo MD        calcium carbonate (TUMS) chewable tablet 1,000 mg  1,000 mg Oral 4x Daily PRN Melida Cedillo MD        cetirizine (zyrTEC) tablet 10 mg  10 mg Oral At Bedtime Brionna Robertson DO   10 mg at 04/07/24 1955    clonazePAM (klonoPIN) tablet 0.5 mg  0.5 mg Oral Daily PRN Melida Cedillo MD        cyclobenzaprine (FLEXERIL) tablet 10 mg  10 mg Oral TID PRN Melida Cedillo MD   10 mg at 04/06/24 1245    diphenhydrAMINE (BENADRYL) capsule 25 mg  25 mg Oral Q6H PRN Vijay Teixeira MD   25 mg at 04/06/24 2351    ferrous sulfate (FEROSUL) tablet 325 mg  325 mg Oral Daily with breakfast Melida Cedillo MD   325 mg at 04/08/24 0815    heparin Lock (1000 units/mL High concentration) 2,000-6,000 Units  2-6 mL Intravenous Once PRN Barney Lovell MD        HYDROmorphone (DILAUDID) half-tab 3 mg  3 mg Oral Q4H PRN Shelia Goyal MD   3 mg at 04/08/24 1103    HYDROmorphone (DILAUDID) tablet 2 mg  2 mg Oral Q4H PRN Shelia Goyal MD   2 mg at 04/07/24 0644    HYDROmorphone (PF) (DILAUDID) injection 0.3 mg  0.3 mg Intravenous Q6H PRN Melida Cedillo MD   0.3 mg at 04/08/24 0605    lidocaine (LMX4) cream   Topical Q1H PRN  Melida Cedillo MD        lidocaine 1 % 0.1-1 mL  0.1-1 mL Other Q1H PRN Melida Cedillo MD        montelukast (SINGULAIR) tablet 10 mg  10 mg Oral QPM Melida Cedillo MD   10 mg at 04/07/24 1956    naloxone (NARCAN) injection 0.2 mg  0.2 mg Intravenous Q2 Min PRN Andrew Cunningham MD        Or    naloxone (NARCAN) injection 0.4 mg  0.4 mg Intravenous Q2 Min PRN Andrew Cunningham MD        Or    naloxone (NARCAN) injection 0.2 mg  0.2 mg Intramuscular Q2 Min PRN Andrew Cunningham MD        Or    naloxone (NARCAN) injection 0.4 mg  0.4 mg Intramuscular Q2 Min PRN Andrew Cunningham MD        norethindrone (AYGESTIN) tablet 5 mg  5 mg Oral Daily Melida Cedillo MD   5 mg at 04/08/24 0814    ondansetron (ZOFRAN ODT) ODT tab 4 mg  4 mg Oral Q6H PRN Melida Cedillo MD        Or    ondansetron (ZOFRAN) injection 4 mg  4 mg Intravenous Q6H PRN Melida Cedillo MD   4 mg at 04/05/24 0723    pantoprazole (PROTONIX) EC tablet 40 mg  40 mg Oral Daily Melida Cedillo MD   40 mg at 04/08/24 0815    polyethylene glycol (MIRALAX) powder 17 g  17 g Oral Daily PRN Melida Cedillo MD        pregabalin (LYRICA) capsule 100 mg  100 mg Oral QAM Melida Cedillo MD   100 mg at 04/08/24 0815    pregabalin (LYRICA) capsule 200 mg  200 mg Oral At Bedtime Brionna Robertson DO   200 mg at 04/07/24 1955    prochlorperazine (COMPAZINE) tablet 5 mg  5 mg Oral Q6H PRN Vijay Teixeira MD   5 mg at 04/06/24 2351    Or    prochlorperazine (COMPAZINE) suppository 25 mg  25 mg Rectal Q12H PRN Vijay Teixeira MD        psyllium (METAMUCIL/KONSYL) Packet 1 packet  1 packet Oral Daily Melida Cedillo MD   1 packet at 04/08/24 0815    Semaglutide-Weight Management (WEGOVY) pen SOAJ 1 mg  1 mg Subcutaneous Weekly Brionna Robertson DO   1 mg at 04/07/24 0949    senna-docusate (SENOKOT-S/PERICOLACE) 8.6-50 MG per tablet 1 tablet  1 tablet Oral BID PRN Melida Cedillo MD         Or    senna-docusate (SENOKOT-S/PERICOLACE) 8.6-50 MG per tablet 2 tablet  2 tablet Oral BID PRN Melida Cedillo MD        sodium chloride (PF) 0.9% PF flush 3 mL  3 mL Intracatheter Q8H Melida Cedillo MD   3 mL at 04/08/24 0605    sodium chloride (PF) 0.9% PF flush 3 mL  3 mL Intracatheter q1 min prn Melida Cedillo MD   3 mL at 04/02/24 0523    sodium chloride 0.9% BOLUS 250 mL  250 mL Intravenous BID PRN BART Puckett MD        vitamin D3 (CHOLECALCIFEROL) tablet 50 mcg  50 mcg Oral Daily Melida Cedillo MD   50 mcg at 04/08/24 0815        Objective    Near full strength in arms and hands    Only momentary lifting of the left leg, no antigravity for right HF  DF is poor with little movement bilaterally  PF is at least 4/5 bilaterally      Diagnostic Work-up Review:    Vit B12 <150    MRI Cervical Spine without and with Contrast (4/6/2024)  IMPRESSION:  1.  No abnormal signal or enhancement within the cervical cord.     2.  DDD change at C5-C6 with loss of disc height. Shallow chronic central/right paracentral disc bulge at C5-C6 results in subtle attenuation of the anterior/right aspect of the subarachnoid space and causes subtle contact of the anterior/right aspect of   the cervical cord as seen on previous MRI scan performed 02/24/2021.      MRI Lumbar Spine without and with  Contrast (3/30/2024)  IMPRESSION:  1.  Cauda equina nerve roots appear mildly enlarged as well as the lower lumbar spine and sacral exiting nerve roots. Some of these nerve roots demonstrate thin nonnodular enhancement. Findings may reflect chronic inflammatory demyelinating   polyneuropathy, Charcot-Monique-Tooth, neurofibromatosis type I. Enhancement could suggest acute infectious inflammatory process superimposed on chronic findings.     2.  Transitional lumbosacral anatomy described above.     3.  No significant degenerative changes.     4.  No significant thecal sac stenosis. No significant neural  foraminal stenosis.       CSF   RBC - 0  WBC - 1  Protein - 104.9        Impression:  33 year old female presented from assisted with generalized weakness and low back pain 3/30/2024.  She has a history of CIDP though had not responded previously to IVIG.  MRI lumbar spine was without any revealing results though did show changes that are consistent with CIDP.  She was started on plasma exchange.  It is noted that there are functional features to her presentation that complicate the assessment.  An MRI c--spine was ordered because of the concern for new arm weakness, this was unremarkable.    Headaches seem like post-LP headache, discussed with her that she could go for an empiric blood patch though also could get MRI first and she preferred the latter to see if there is some confirmatory evidence for this.  Post-LP headaches can often resolve spontaneously without treatment, though ablood patch would be the treatment protocol if needed.      Recommendations:  - Ordered IM vitamin B12, to follow up with PCP as outpatient  - MRI brain to assess  for pachymeningeal enhancement   - continue PLEX, likely for 7 cycles given slow/mild improvement    I spent 44 minutes on the date of encounter with the patient and before and after the visit on activities detailed in the above note which may include reviewing the EMR, documenting clinical information, and communicating with other health care professionals.    Zurdo Giang MD  [facility] [phone]

## 2024-04-08 NOTE — PLAN OF CARE
Pt here with CIPD flare up. A&Ox4. Neuros intact except for BLE weakness 2/5 and numbness on toes at baseline. BLE edema- elevated with pillows. VSS on CPAP overnight. Regular diet, thin liquids. Takes pills whole. Up with 2 mabel steady. Purewick in place overnight. Tunneled cath & L PICCPt is complaining of pain in back- prn dilaudid and cold packs given. Sitter at bedside. No 4am assessment d/t DND order. Plan for 4th PLEX treatment today. Discharge pending.

## 2024-04-08 NOTE — PROGRESS NOTES
[facility]    Hospitalist Progress Note    Interval History   Patient is awake and alert.  Undergoing physical therapy and she was evaluated during therapy.  Needed Yajaira steady to stand up.  Answering questions appropriately.  No acute events overnight.  Continues to have significant lower extremity weakness but improving and endorsing headache.      -Data reviewed today: I reviewed all new labs and imaging results over the last 24 hours. I personally reviewed the mr cervical spine  image(s) showing no abnormal signal . Ddd changes at c5-c6 with loss of disc height  .    Physical Exam   Temp: 98.3  F (36.8  C) Temp src: Oral BP: 111/66 Pulse: 97   Resp: 17 SpO2: 97 % O2 Device: None (Room air)    Vitals:    04/06/24 1315   Weight: 114.3 kg (252 lb)     Vital Signs with Ranges  Temp:  [97.7  F (36.5  C)-98.5  F (36.9  C)] 98.3  F (36.8  C)  Pulse:  [] 97  Resp:  [14-26] 17  BP: (111-149)/(56-91) 111/66  SpO2:  [97 %] 97 %  I/O last 3 completed shifts:  In: 240 [P.O.:240]  Out: 3150 [Urine:3150]    Physical Exam  Constitutional:       Appearance: Normal appearance. She is obese.   Cardiovascular:      Rate and Rhythm: Normal rate and regular rhythm.      Pulses: Normal pulses.      Heart sounds: Normal heart sounds.   Pulmonary:      Effort: Pulmonary effort is normal. No respiratory distress.      Breath sounds: Normal breath sounds.   Abdominal:      General: Abdomen is flat. Bowel sounds are normal. There is no distension.      Tenderness: There is no abdominal tenderness. There is no guarding.   Skin:     General: Skin is warm and dry.   Neurological:      General: No focal deficit present.      Comments: 2/5 strength in bilateral lower extremities . Needs to use serasteady to move.            Medications   Current Facility-Administered Medications   Medication Dose Route Frequency Provider Last Rate Last Admin     Current Facility-Administered Medications   Medication Dose Route  Frequency Provider Last Rate Last Admin    caffeine (NO-DOZE) tablet 200 mg  200 mg Oral Daily Xena Ramirez MD        cetirizine (zyrTEC) tablet 10 mg  10 mg Oral At Bedtime Brionna Robertson DO   10 mg at 04/07/24 1955    cyanocobalamin injection 1,000 mcg  1,000 mcg Intramuscular Once Zurdo Giang MD        ferrous sulfate (FEROSUL) tablet 325 mg  325 mg Oral Daily with breakfast Melida Cedillo MD   325 mg at 04/08/24 0815    montelukast (SINGULAIR) tablet 10 mg  10 mg Oral QPM Melida Cedillo MD   10 mg at 04/07/24 1956    norethindrone (AYGESTIN) tablet 5 mg  5 mg Oral Daily Melida Cedillo MD   5 mg at 04/08/24 0814    pantoprazole (PROTONIX) EC tablet 40 mg  40 mg Oral Daily Melida Cedillo MD   40 mg at 04/08/24 0815    pregabalin (LYRICA) capsule 100 mg  100 mg Oral QAM Melida Cedillo MD   100 mg at 04/08/24 0815    pregabalin (LYRICA) capsule 200 mg  200 mg Oral At Bedtime Brionna Robertson DO   200 mg at 04/07/24 1955    psyllium (METAMUCIL/KONSYL) Packet 1 packet  1 packet Oral Daily Melida Cedillo MD   1 packet at 04/08/24 0815    Semaglutide-Weight Management (WEGOVY) pen SOAJ 1 mg  1 mg Subcutaneous Weekly Brionna Robertson DO   1 mg at 04/07/24 0949    sodium chloride (PF) 0.9% PF flush 3 mL  3 mL Intracatheter Q8H Melida Cedillo MD   3 mL at 04/08/24 0605    vitamin D3 (CHOLECALCIFEROL) tablet 50 mcg  50 mcg Oral Daily Melida Cedillo MD   50 mcg at 04/08/24 0815       Data   Recent Labs   Lab 04/08/24  0604 04/07/24  0638 04/04/24  0557 04/04/24  0010 04/02/24  1018   WBC  --  8.0  --  14.1* 7.1   HGB  --  11.9  --  11.6* 13.1   MCV  --  92  --  89 91   PLT  --  139*  --  161 258   INR 1.23*  --   --  1.34*  --    NA  --  143 142  --  141   POTASSIUM  --  4.1 3.8  --  3.6   CHLORIDE  --  109* 109*  --  108*   CO2  --  26 24  --  20*   BUN  --  8.3 16.5  --  12.6   CR  --  0.52 0.62  --  0.54   ANIONGAP  --  8  9  --  13   KELBY  --  8.8 8.3*  --  8.7   GLC  --  116* 117*  --  151*   ALBUMIN  --   --  3.5  --   --        No results found for this or any previous visit (from the past 24 hour(s)).      Assessment & Plan   Acute bilateral lower extremity paralysis dt CIDP flare, s/p PLEX starting 4/2  CIDP (chronic inflammatory demyelinating polyneuropathy) (H) (4/1/2024)  Chronic bilateral LE neuropathy, sec to above    *Has history of CIDP with peripheral neuropathy.  She underwent treatments with IVIG in the past.  *Initially presented to [facility] ED on 3/30/2024 for evaluation of new onset back pain and bilateral lower extremity weakness (mostly experiences numbness, so weakness was a new symptom for her).  She was unable to ambulate.  *In the ED, labs were generally unremarkable.  General neurology was consulted.  She underwent further evaluation with MRI of the thoracic and lumbar spines.  MRI of the thoracic spine was unremarkable.  MRI lumbar spine showed enlargement of the cauda equina nerve roots and enhancement which was suggestive of CIDP per neurology.  She will underwent a lumbar puncture which showed elevated protein and albumin cytologic dissociation which confirmed the diagnosis of CIDP.  She was given 1 g of IV Solu-Medrol and transferred to [facility] to initiate plasma exchange. PICC line was placed.  *Tunneled catheter placed per IR on 4/2 and patient underwent her first round of plasma exchange later that day.  04/05: P.o. Dilaudid dosage was adjusted due to pain at catheter site.  Cervical spine MRI ordered per neurology was not completed due to claustrophobia  04/06: MRI completed with IV Ativan, underwent third Plex treatment    -- ongoing management per neurology  -- Continue PLEX per nephrology; plan for treatment every other day, treatment #4 on 4/8, plan is for 5-7 treatments total  -- Continue routine neurochecks and fall precautions  -- Continue PTA Lyrica (100mg in AM and 200mg  "HS)  -- PT/OT following, recommending TCU vs ARU  -- As needed Dilaudid 2 mg for moderate pain every 4 hours and 3 mg for severe pain.  -- Neurology has been following, highly appreciate their help  -- MRI of the cervical spine with and without contrast completed last evening 04/06 due to new right upper extremity weakness showing no abnormal signal or enhancement within the cervical cord.  DDD changes at C5-C6 levels were noticed.  -- MRI brain ordered in the setting of ongoing headache.  Did order caffeine 200 mg daily since she said headache started after LP.  -- Bilateral lower extremity DVT ultrasound done for swelling.  Negative.    Vitamin B12 deficiency  -Vitamin B12 level less than 150.  Methylmalonic acid level pending.  -One-time dose of 1000 mcg IM given on 4/8/2024  -Patient has elevated homocystine level at 17.1 which can be seen with vitamin B12 deficiency.  This should be rechecked.      Anxiety  Depression  Bipolar disorder  Chronic pain   Hx suspected medication seeking behavior  Frequent medical noncompliance  Frequent utilization of healthcare system  *Complicated psychiatric history.  Patient has a guardian and noted ED treatment plan.  She stated to admitting provider that she may be able to come off her guardianship as \"she is doing really well\" and she was planning to move out of her group home and back to an apartment.  *Psychiatry was consulted while she was at [facility], she was seen by DEC counselor while in the ED.  [place] that she was medically cleared to discharge once medical issues had stabilized.    -- Continue on Lyrica as above and Klonopin as needed.  -- As above, oral Dilaudid has been ordered.  -- She is stable from a mental health standpoint.  Will consider psychiatry consultation if she worsens.     Hx self injurious behavior w/foreign body ingestion  *Hx of 47 endoscopies in last 4 years for foreign body extractions ([place], mickie hair pins, etc) so far during this hospital " "stay there has no noted self injurious behavior     Chronic abdominal pain  *Noted subacute/chronic issue. Seen at [facility] on 3/29/24 and had normal EGD then left AMA; path report negative for dysplasia, gastritis, or Helicobacter.  *Abx xray 3/31 was nl.   *Chronic and stable on PPI.      Morbid obesity  *Is on Wegovy, reports weight loss of 60 pounds recently. [place] to receive this medication while in the hospital, was given on 3/31.  *Will continue weekly on Sundays while hospitalized, brought in home supply to use.     ZOHRA  *Brought in home CPAP to use on 4/3.     Elevated INR  -INR at 1.23.  Unclear etiology.  LFTs ordered.  -Could have fatty liver in the setting of obesity    Clinically Significant Risk Factors               # Coagulation Defect: INR = 1.23 (Ref range: 0.85 - 1.15) and/or PTT = 31 Seconds (Ref range: 22 - 38 Seconds), will monitor for bleeding           # Severe Obesity: Estimated body mass index is 46.09 kg/m  as calculated from the following:    Height as of this encounter: 1.575 m (5' 2\").    Weight as of this encounter: 114.3 kg (252 lb).      # Financial/Environmental Concerns: none          DVT Prophylaxis: Lovenox 40 SQ twice daily  Code Status: Full Code  Disposition: Expected discharge when medically stable     Greater than 50 minutes spent on documentation review, imaging review, discussing care at bedside with the nurse and examination    Xena Ramirez MD, MD  [phone](p)   "

## 2024-04-08 NOTE — PROGRESS NOTES
Treatment in room D40 using [employer] apheresis machine. Consent verified.     Height: 5 ft 2 in  Weight: 252 lb  HCT: 36%  Inlet speed: 75  ACDA ratio: 10:1  Fluid balance: 100%  Access: right CVC     Replacement product: 3000 mL 5% Albumin.  Electrolyte replacement: Ca Gluconate 3 grams in NS - total 80 mls, piggybacked into return lines, infused over time of treatment.    Premedications: none.     Treatment Notes: pt tolerated tx well. No complications.     Treatment completed as ordered, VSS, see EPIC flowsheet for run data.     Next treatment: Wednesday 4/10/2024    Nguyen MCNEILL,, Haydeeeresis Valentina MORALES

## 2024-04-08 NOTE — PROGRESS NOTES
[facility]  Neuroscience and Spine [place]  Neurology Daily Note            Interval History:   Nevin continues to complain of a mild bifrontal headache, she indicated that this headache is worse when sitting, it would appear that there is no significant worsening when she is in the upright position and walking, not necessarily relieved with rest or in the supine position, not not typical for post spinal low pressure headaches, this will need to be monitored in the future, probably representing headaches due to musculoskeletal strain.  -Again today, she noted some subjective improvement in that she was able to walk with the physical therapist with a walker and felt that the weakness in her right hand has improved marginally.  --She has completed 4 Plex treatment without incident.    MRI of the cervical spine was attempted on 4/5/2024 but the patient could not tolerate the procedure, this will be repeated with sedation today on 4/6/2024.  Today she will move completed 3 Plex treatment, 5-7 was recommended.     Nevin Alvarado is a 33-year-old patient who has a history for bipolar disorder, general anxiety disorder and depression who has a history for CIDP and had been on followed in [facility],  More recently with [facility] who presented with worsening lower extremity weakness and was admitted on3/30/2024 when she was seen by .  At that time she was also complaining of worsening lower back pain and had MRI of the lumbar spine, and MRI of the thoracic spine done with and without contrast, neither of which showing any acute neural compression or abnormal cord signal, however, there was evidence of slight no nodular enhancement of the nerves of the cauda equina concerning for inflammatory neuropathy.  Patient had had EMG done by general  Clinic neurologist which was said to demonstrate axonal neuropathy although, she has been diagnosed with CIDP in the past and notably had been  treated with IVIG and prednisone and the patient reporting that this treatment had not been helpful.  She was initially treated with IV Solu-Medrol, was was subsequently started on Plex and has completed 2 of 5-7 sessions reporting some improvement in strength in her lower extremities.  However, she is now reporting having weakness in the right upper extremity.         Review of Systems:   The 10 point Review of Systems is negative other than noted in the HPI       Medications:   Scheduled Meds:  Current Facility-Administered Medications   Medication Dose Route Frequency Provider Last Rate Last Admin    cetirizine (zyrTEC) tablet 10 mg  10 mg Oral At Bedtime Brionna Robertson DO   10 mg at 04/07/24 1955    ferrous sulfate (FEROSUL) tablet 325 mg  325 mg Oral Daily with breakfast Melida Cedillo MD   325 mg at 04/07/24 0952    montelukast (SINGULAIR) tablet 10 mg  10 mg Oral QPM Melida Cedillo MD   10 mg at 04/07/24 1956    norethindrone (AYGESTIN) tablet 5 mg  5 mg Oral Daily Melida Cedillo MD   5 mg at 04/07/24 0952    pantoprazole (PROTONIX) EC tablet 40 mg  40 mg Oral Daily Melida Cedillo MD   40 mg at 04/07/24 0952    pregabalin (LYRICA) capsule 100 mg  100 mg Oral QAM Melida Cedillo MD   100 mg at 04/07/24 0952    pregabalin (LYRICA) capsule 200 mg  200 mg Oral At Bedtime Brionna Robertson DO   200 mg at 04/07/24 1955    psyllium (METAMUCIL/KONSYL) Packet 1 packet  1 packet Oral Daily Melida Cedillo MD   1 packet at 04/07/24 0952    Semaglutide-Weight Management (WEGOVY) pen SOAJ 1 mg  1 mg Subcutaneous Weekly Brionna Robertson DO   1 mg at 04/07/24 0949    sodium chloride (PF) 0.9% PF flush 3 mL  3 mL Intracatheter Q8H Melida Cedillo MD   3 mL at 04/07/24 1541    vitamin D3 (CHOLECALCIFEROL) tablet 50 mcg  50 mcg Oral Daily Melida Cedillo MD   50 mcg at 04/07/24 0952     PRN Meds:   Current Facility-Administered Medications    Medication Dose Route Frequency Provider Last Rate Last Admin    acetaminophen (TYLENOL) tablet 1,000 mg  1,000 mg Oral Q6H PRN Melida Cedillo MD   1,000 mg at 04/07/24 1541    albuterol (PROVENTIL HFA/VENTOLIN HFA) inhaler  2 puff Inhalation Q6H PRN Melida Cedillo MD        benzonatate (TESSALON) capsule 100 mg  100 mg Oral TID PRN Melida Cedillo MD        calcium carbonate (TUMS) chewable tablet 1,000 mg  1,000 mg Oral 4x Daily PRN Melida Cedillo MD        clonazePAM (klonoPIN) tablet 0.5 mg  0.5 mg Oral Daily PRN Melida Cedillo MD        cyclobenzaprine (FLEXERIL) tablet 10 mg  10 mg Oral TID PRN Melida Cedillo MD   10 mg at 04/06/24 1245    diphenhydrAMINE (BENADRYL) capsule 25 mg  25 mg Oral Q6H PRN Vijay Teixeira MD   25 mg at 04/06/24 2351    heparin Lock (1000 units/mL High concentration) 2,000-6,000 Units  2-6 mL Intravenous Once PRN Barney Lovell MD        HYDROmorphone (DILAUDID) half-tab 3 mg  3 mg Oral Q4H PRN Shelia Goyal MD   3 mg at 04/07/24 1139    HYDROmorphone (DILAUDID) tablet 2 mg  2 mg Oral Q4H PRN Shelia Goyal MD   2 mg at 04/07/24 0644    HYDROmorphone (PF) (DILAUDID) injection 0.3 mg  0.3 mg Intravenous Q6H PRN Melida Cedillo MD   0.3 mg at 04/07/24 2203    lidocaine (LMX4) cream   Topical Q1H PRN Melida Cedillo MD        lidocaine 1 % 0.1-1 mL  0.1-1 mL Other Q1H PRN Melida Cedillo MD        naloxone (NARCAN) injection 0.2 mg  0.2 mg Intravenous Q2 Min PRN Andrew Cunningham MD        Or    naloxone (NARCAN) injection 0.4 mg  0.4 mg Intravenous Q2 Min PRN Andrew Cunningham MD        Or    naloxone (NARCAN) injection 0.2 mg  0.2 mg Intramuscular Q2 Min PRN Andrew Cunningham MD        Or    naloxone (NARCAN) injection 0.4 mg  0.4 mg Intramuscular Q2 Min PRN Andrew Cunningham MD        ondansetron (ZOFRAN ODT) ODT tab 4 mg  4 mg Oral Q6H PRN Melida Cedillo MD        Or    ondansetron (ZOFRAN) injection 4 mg  4 mg  "Intravenous Q6H PRN Melida Cedillo MD   4 mg at 04/05/24 0723    polyethylene glycol (MIRALAX) powder 17 g  17 g Oral Daily PRN Melida Cedillo MD        prochlorperazine (COMPAZINE) tablet 5 mg  5 mg Oral Q6H PRN Vijay Teixeira MD   5 mg at 04/06/24 2351    Or    prochlorperazine (COMPAZINE) suppository 25 mg  25 mg Rectal Q12H PRN Vijay Teixeira MD        senna-docusate (SENOKOT-S/PERICOLACE) 8.6-50 MG per tablet 1 tablet  1 tablet Oral BID PRN Melida Cedillo MD        Or    senna-docusate (SENOKOT-S/PERICOLACE) 8.6-50 MG per tablet 2 tablet  2 tablet Oral BID PRMelida Quintero MD        sodium chloride (PF) 0.9% PF flush 3 mL  3 mL Intracatheter q1 min prn Melida Cedillo MD   3 mL at 04/02/24 0523           Physical Exam:   Vitals: Blood pressure 122/80, pulse 103, temperature 98.5  F (36.9  C), temperature source Oral, resp. rate 16, height 1.575 m (5' 2\"), weight 114.3 kg (252 lb), last menstrual period 07/12/2023, SpO2 96%, not currently breastfeeding.  General Appearance:    She was awake and alert with fluent speech and fund of knowledge was appropriate.  Neuro:       Mental Status Exam:    She was oriented to time place and person follows simple and complex commands quite well       Cranial Nerves:   Cranial nerves II to XII were intact          Motor:   There was normal muscle bulk and tone in all 4 extremities and there were no dystonic movements.  Her grasp but no symmetrical 4/5.  She still appeared to have significant lower extremity weakness, although it was difficult to assess her motor strength, in part there was decreased effort proximally.  On examination, she was able to have minimal movement in the hip flexors, however was able to walk with physical therapy with a walker.  She appeared to have fairly good strength in the dorsiflexors and plantar flexors where the strength was 4/5.                  Coordination:   There was no dysmetria to " finger-to-nose testing.         Gait: Walked with physical therapy with a walker and support.  Cardiovascular: Regular rate and rhythm, no m/r/g  Lungs: Clear to auscultation  Abdomen: Soft, not tender, not destended  Extremities: No clubbing, no cyanosis, no edema       Data:   ROUTINE IP LABS (Last 3results)  CBC RESULTS:     Recent Labs   Lab 04/07/24  0638 04/04/24  0010 04/02/24  1018   WBC 8.0 14.1* 7.1   RBC 3.87 3.87 4.31   HGB 11.9 11.6* 13.1   HCT 35.7 34.6* 39.4   * 161 258     Basic Metabolic Panel:  Recent Labs   Lab 04/07/24  0638 04/04/24  0557 04/02/24  1018    142 141   POTASSIUM 4.1 3.8 3.6   CHLORIDE 109* 109* 108*   CO2 26 24 20*   BUN 8.3 16.5 12.6   CR 0.52 0.62 0.54   * 117* 151*   KELBY 8.8 8.3* 8.7     INR:  Recent Labs   Lab 04/04/24  0010   INR 1.34*      Lipid Profile:  Recent Labs   Lab Test 02/11/22  0844 11/30/20  0823   CHOL 132 130   HDL 41* 44*   LDL 38 50   TRIG 266* 182*     TSH:  TSH   Date Value Ref Range Status   06/27/2023 4.47 (H) 0.30 - 4.20 uIU/mL Final   02/11/2022 4.94 (H) 0.40 - 4.00 mU/L Final   11/30/2020 3.19 0.40 - 4.00 mU/L Final   ,   Vitamin B12:   Lab Results   Component Value Date    B12 <150 04/07/2024    B12 279 03/21/2018          Latest Reference Range & Units 04/01/24 10:43    RBC CSF 0 - 2 /uL 0   Appearance CSF Clear  Clear   Color CSF Colorless  Colorless   Glucose CSF 40 - 70 mg/dL 60   Protein total CSF 15.0 - 45.0 mg/dL 104.9 (H)   (H): Data is abnormally high     MRI lumbar spine without and with contrast  3/30/2024     No significant neural compression or evidence for central canal stenosis, cauda equina and nerve roots were mildly enlarged in the lower lumbar and sacral exiting root, some of these demonstrating thin  nonnodular enhancement reflecting chronic inflammatory demyelinating polyneuropathy     MRI thoracic spine 3/30/2024  No significant central canal stenosis, mild multilevel degenerative changes with moderate left  foraminal stenosis at T6/T7 and mild to moderate left foraminal stenosis at T7-T8, there were no enhancing lesions and no abnormal cord signal    MRI of the cervical spine, with and without contrast, 4/6/2024: There was minor multilevel degenerative disc disease, chronic central right paracentral disc bulge at C5-6 with subtle attenuation of the anterior aspect of the subarachnoid space but no critical central canal stenosis, no abnormal cord signal, no enhancing lesions.      Assessment and Plan:   This is a 33-year-old patient with a history for CIDP, CSF studies demonstrate albumin/cytological dissociation and the patient presented with worsening lower extremity weakness.  -- She has been assessed by by  who recommended Plex treatment as the patient reportedly has not responded to IVIG in the past.  --- The patient reported some improvement in her lower extremity weakness, and that she had ambulated with physical therapy yesterday.  I agree that there does appear to be some functional component to her examination making it somewhat difficult to have objective evaluation of her motor strength.     Low B12 level 203 and 279, 2018 and 2022 of unclear significance, recommend repeat B12 level and additional laboratory studies, methylmalonic acid and homocystine levels   3.  Persistent generalized headaches, not positional, not typical for post spinal headaches, although this will need to be monitored in the future.  Recommendations:  -- Continue Plex treatment, 5-7 sessions anticipated, patient has completed 4 and will have fifth on 4/8, she is showing some signs of improvement, and would be will be reassessed after her fifth treatment to see whether she should proceed to 7 treatments.  --- Continue physical and Occupational Therapy.  --- Psychiatric consultation has been recommended by , it is noted that she has already been seen by psychotherapy.           Angela Zavala MD  [facility]  Telephone number [phone]

## 2024-04-09 ENCOUNTER — APPOINTMENT (OUTPATIENT)
Dept: PHYSICAL THERAPY | Facility: CLINIC | Age: 34
DRG: 074 | End: 2024-04-09
Attending: HOSPITALIST
Payer: COMMERCIAL

## 2024-04-09 ENCOUNTER — APPOINTMENT (OUTPATIENT)
Dept: OCCUPATIONAL THERAPY | Facility: CLINIC | Age: 34
DRG: 074 | End: 2024-04-09
Attending: HOSPITALIST
Payer: COMMERCIAL

## 2024-04-09 PROCEDURE — 97535 SELF CARE MNGMENT TRAINING: CPT | Mod: GO

## 2024-04-09 PROCEDURE — 250N000013 HC RX MED GY IP 250 OP 250 PS 637: Performed by: INTERNAL MEDICINE

## 2024-04-09 PROCEDURE — 99232 SBSQ HOSP IP/OBS MODERATE 35: CPT | Performed by: HOSPITALIST

## 2024-04-09 PROCEDURE — 97530 THERAPEUTIC ACTIVITIES: CPT | Mod: GP

## 2024-04-09 PROCEDURE — 97116 GAIT TRAINING THERAPY: CPT | Mod: GP

## 2024-04-09 PROCEDURE — 250N000013 HC RX MED GY IP 250 OP 250 PS 637: Performed by: HOSPITALIST

## 2024-04-09 PROCEDURE — 120N000001 HC R&B MED SURG/OB

## 2024-04-09 PROCEDURE — 250N000011 HC RX IP 250 OP 636: Performed by: INTERNAL MEDICINE

## 2024-04-09 PROCEDURE — 97530 THERAPEUTIC ACTIVITIES: CPT | Mod: GO

## 2024-04-09 PROCEDURE — 250N000011 HC RX IP 250 OP 636: Performed by: HOSPITALIST

## 2024-04-09 RX ADMIN — ACETAMINOPHEN 1000 MG: 500 TABLET, FILM COATED ORAL at 23:35

## 2024-04-09 RX ADMIN — NORETHINDRONE ACETATE 5 MG: 5 TABLET ORAL at 09:11

## 2024-04-09 RX ADMIN — HYDROMORPHONE HYDROCHLORIDE 0.3 MG: 1 INJECTION, SOLUTION INTRAMUSCULAR; INTRAVENOUS; SUBCUTANEOUS at 20:35

## 2024-04-09 RX ADMIN — ACETAMINOPHEN 1000 MG: 500 TABLET, FILM COATED ORAL at 15:58

## 2024-04-09 RX ADMIN — HYDROMORPHONE HYDROCHLORIDE 3 MG: 2 TABLET ORAL at 12:20

## 2024-04-09 RX ADMIN — PANTOPRAZOLE SODIUM 40 MG: 40 TABLET, DELAYED RELEASE ORAL at 09:11

## 2024-04-09 RX ADMIN — FERROUS SULFATE TAB 325 MG (65 MG ELEMENTAL FE) 325 MG: 325 (65 FE) TAB at 09:11

## 2024-04-09 RX ADMIN — CETIRIZINE HYDROCHLORIDE 10 MG: 10 TABLET, FILM COATED ORAL at 20:42

## 2024-04-09 RX ADMIN — PREGABALIN 200 MG: 100 CAPSULE ORAL at 20:40

## 2024-04-09 RX ADMIN — ACETAMINOPHEN 1000 MG: 500 TABLET, FILM COATED ORAL at 09:11

## 2024-04-09 RX ADMIN — MONTELUKAST 10 MG: 10 TABLET, FILM COATED ORAL at 20:40

## 2024-04-09 RX ADMIN — Medication 200 MG: at 09:12

## 2024-04-09 RX ADMIN — HYDROMORPHONE HYDROCHLORIDE 0.3 MG: 1 INJECTION, SOLUTION INTRAMUSCULAR; INTRAVENOUS; SUBCUTANEOUS at 05:47

## 2024-04-09 RX ADMIN — ENOXAPARIN SODIUM 40 MG: 40 INJECTION SUBCUTANEOUS at 20:42

## 2024-04-09 RX ADMIN — PREGABALIN 100 MG: 100 CAPSULE ORAL at 09:11

## 2024-04-09 RX ADMIN — PSYLLIUM HUSK 1 PACKET: 3.4 POWDER ORAL at 09:07

## 2024-04-09 RX ADMIN — ACETAMINOPHEN 1000 MG: 500 TABLET, FILM COATED ORAL at 01:22

## 2024-04-09 RX ADMIN — ENOXAPARIN SODIUM 40 MG: 40 INJECTION SUBCUTANEOUS at 09:11

## 2024-04-09 RX ADMIN — ONDANSETRON 4 MG: 2 INJECTION INTRAMUSCULAR; INTRAVENOUS at 20:35

## 2024-04-09 RX ADMIN — Medication 50 MCG: at 09:11

## 2024-04-09 RX ADMIN — CALCIUM CARBONATE (ANTACID) CHEW TAB 500 MG 1000 MG: 500 CHEW TAB at 18:07

## 2024-04-09 ASSESSMENT — ACTIVITIES OF DAILY LIVING (ADL)
ADLS_ACUITY_SCORE: 51
ADLS_ACUITY_SCORE: 60
ADLS_ACUITY_SCORE: 61
ADLS_ACUITY_SCORE: 60
ADLS_ACUITY_SCORE: 51
ADLS_ACUITY_SCORE: 60
ADLS_ACUITY_SCORE: 47
ADLS_ACUITY_SCORE: 47
ADLS_ACUITY_SCORE: 60
ADLS_ACUITY_SCORE: 60
ADLS_ACUITY_SCORE: 51
ADLS_ACUITY_SCORE: 60
ADLS_ACUITY_SCORE: 56
ADLS_ACUITY_SCORE: 60
ADLS_ACUITY_SCORE: 56
ADLS_ACUITY_SCORE: 60
ADLS_ACUITY_SCORE: 47
ADLS_ACUITY_SCORE: 60
ADLS_ACUITY_SCORE: 56

## 2024-04-09 NOTE — PROGRESS NOTES
CLINICAL NUTRITION SERVICES - REASSESSMENT NOTE    Recommendations Ordered by Registered Dietitian (RD):   Encouraged po intake, emphasizing the importance of protein to preserve lean muscle mass  Continue daily Ensure Max and updated flavor preferences    Malnutrition: Patient does not meet two of the above criteria necessary for diagnosing malnutrition      EVALUATION OF PROGRESS TOWARD GOALS   Diet:  Regular and vanilla Ensure Max     Intake/Tolerance:  Nevin was sitting up, resting in bed with a friend in the room while reporting her po intake has been going well. She reported to be finishing most of the daily Ensure Max, but mentioned she wanted to rotate flavors since we have chocolate back in stock. She wanted to continue receiving the Ensure Max and was encouraged to do her best finishing them and always try to order some protein on her meal trays from room service. She also noted testing confirmed she does not have any gluten, dairy, or egg allergies. These were making her meal ordering difficult while at [facility], but has not been the case here as they are correctly not currently listed in [employer] or [employer].   - Flowsheets show a good appetite and x1-3 intakes/day of %.     ASSESSED NUTRITION NEEDS:  Dosing Weight 66.1 kg (adjusted)  Estimated Energy Needs: 4471-5007 kcals (20-25 Kcal/Kg)  Justification: maintenance  Estimated Protein Needs: 79-99 grams protein (1.2-1.5 g pro/Kg)  Justification: preservation of lean body mass  Estimated Fluid Needs: 1 mL/kcal or per provider pending fluid status    NEW FINDINGS:    Weight:   04/06/24 1315 114.3 kg (252 lb)     GI: LBM x1-2 on yesterday    Skin: Edema 1+ Trace BLE - 2+ Mild both feet     Meds: Ferosul, D3, Wegovy    Labs: Reviewed     Previous Goals:   Diet advancement within 48 hours  Evaluation: Met    Previous Nutrition Diagnosis:   No nutrition diagnosis identified at this time  Evaluation: No change    MALNUTRITION  % Weight Loss:  None noted  - no new wt + previous loss intentional  % Intake:  No decreased intake noted  Subcutaneous Fat Loss:  None observed  Muscle Loss:  None observed  Fluid Retention:  1+ Trace BLE - 2+ Mild both feet     Malnutrition Diagnosis: Patient does not meet two of the above criteria necessary for diagnosing malnutrition    CURRENT NUTRITION DIAGNOSIS  No nutrition diagnosis identified at this time    INTERVENTIONS  Recommendations / Nutrition Prescription  Encouraged po intake, emphasizing the importance of protein to preserve lean muscle mass  Continue daily Ensure Max and updated flavor preferences     Implementation  General/Healthful diet     Goals  PO - >75% of meal trays TID and daily supplement     MONITORING AND EVALUATION:  Progress towards goals will be monitored and evaluated per protocol and Practice Guideline    Foster Vazquez RD, LD

## 2024-04-09 NOTE — PROGRESS NOTES
Neurology Progress Note      Subjective    Patient states headaches are no better, resolve when laying flat.  She thinks weakness is improving, especially able to move arms more.      For me, SA and  5/5 bilaterally  Still not lifting up legs against gravity (or only slightly 3-), PF 5/5 b/l and while DF is limited, there does seem to be some significant give-way.  I thought I detected almost  a moment of near full strength in the right DF that quickly gave way.            Diagnostic Work-up Review:      Vit B12 <150    MRI brain without and with Contrast (4/8/2024)  IMPRESSION:  1.  No recent infarct, intracranial mass, pathologic enhancement or evidence of intracranial hemorrhage.  2.  Scattered nonspecific foci of nonenhancing T2 signal hyperintensity in the supratentorial white matter in a random distribution. Differential considerations include foci of nonspecific gliosis or chronic myelin loss, sequelae of chronic headaches and   age-advanced chronic small vessel ischemic changes. The appearance is unchanged compared with the brain MRI of 10/12/2023.     MRI Cervical Spine without and with Contrast (4/6/2024)  IMPRESSION:  1.  No abnormal signal or enhancement within the cervical cord.     2.  DDD change at C5-C6 with loss of disc height. Shallow chronic central/right paracentral disc bulge at C5-C6 results in subtle attenuation of the anterior/right aspect of the subarachnoid space and causes subtle contact of the anterior/right aspect of   the cervical cord as seen on previous MRI scan performed 02/24/2021.        MRI Lumbar Spine without and with  Contrast (3/30/2024)  IMPRESSION:  1.  Cauda equina nerve roots appear mildly enlarged as well as the lower lumbar spine and sacral exiting nerve roots. Some of these nerve roots demonstrate thin nonnodular enhancement. Findings may reflect chronic inflammatory demyelinating   polyneuropathy, Charcot-Monique-Tooth, neurofibromatosis type I. Enhancement could  suggest acute infectious inflammatory process superimposed on chronic findings.     2.  Transitional lumbosacral anatomy described above.     3.  No significant degenerative changes.     4.  No significant thecal sac stenosis. No significant neural foraminal stenosis.        CSF   RBC - 0  WBC - 1  Protein - 104.9        Impression:  33 year old female presented from detention with generalized weakness and low back pain 3/30/2024.  She has a history of CIDP though had not responded previously to IVIG.  MRI lumbar spine was without any revealing results though did show changes that are consistent with CIDP.  She was started on plasma exchange (she is s/p 4 cycles).  It is noted that there are functional features to her presentation that complicate the assessment.  An MRI c--spine was ordered because of the concern for new arm weakness, this was unremarkable.     Headaches seem like post-LP headache, MRI was negative for any signs of this however.  I did find it odd that she was sitting up whenever I come into her room.  Most patients with intracranial hypotension instinctively position themselves laying flat.  Before we go to the blood patch, I told her to try laying flat for most of today (unless eating / drinking or to work with PT).      Her strength seems better for me today.  The evaluation of this is confounded by some functional signs / give-way and inconsistent function.  But given the improvement, would favor completing 7 cycles.        Recommendations:   - continue PLEX, completed 4 / 7 cycles  - Instructed her to lay flat today unless needed for other tasks (such as drinking or working with PT)  - IM B12 administered for significant deficiency, will need follow up with internal medicine for further treatment    I spent 37 minutes on the date of encounter with the patient and before and after the visit on activities detailed in the above note which may include reviewing the EMR, documenting clinical  information, and communicating with other health care professionals.    Zurdo Giang MD  [facility] [phone]

## 2024-04-09 NOTE — PROGRESS NOTES
[facility]    Hospitalist Progress Note    Interval History   Patient is awake and alert.  Using the restroom this morning.  Continues to have significant lower extremity weakness that is slowly improving.  Has chronic lower back pain and leg pain.  No nausea or vomiting.  No fever or chills.    -Data reviewed today: I reviewed all new labs and imaging results over the last 24 hours. I personally reviewed the mr cervical spine  image(s) showing no abnormal signal . Ddd changes at c5-c6 with loss of disc height  .    Physical Exam   Temp: 98.6  F (37  C) Temp src: Oral BP: 125/87 Pulse: 94   Resp: 18 SpO2: 95 % O2 Device: None (Room air)    Vitals:    04/06/24 1315   Weight: 114.3 kg (252 lb)     Vital Signs with Ranges  Temp:  [98.6  F (37  C)-99.6  F (37.6  C)] 98.6  F (37  C)  Pulse:  [77-94] 94  Resp:  [16-18] 18  BP: (101-127)/(73-87) 125/87  SpO2:  [95 %-98 %] 95 %  I/O last 3 completed shifts:  In: 490 [P.O.:490]  Out: 1900 [Urine:1900]    Physical Exam  Constitutional:       Appearance: Normal appearance. She is obese.   Cardiovascular:      Rate and Rhythm: Normal rate and regular rhythm.      Pulses: Normal pulses.      Heart sounds: Normal heart sounds.   Pulmonary:      Effort: Pulmonary effort is normal. No respiratory distress.      Breath sounds: Normal breath sounds.   Abdominal:      General: Abdomen is flat. Bowel sounds are normal. There is no distension.      Tenderness: There is no abdominal tenderness. There is no guarding.   Skin:     General: Skin is warm and dry.   Neurological:      General: No focal deficit present.      Comments: 2/5 strength in bilateral lower extremities . Needs to use serasteady to move.            Medications   Current Facility-Administered Medications   Medication Dose Route Frequency Provider Last Rate Last Admin     Current Facility-Administered Medications   Medication Dose Route Frequency Provider Last Rate Last Admin    caffeine  (NO-DOZE) tablet 200 mg  200 mg Oral Daily Xena Ramirez MD   200 mg at 04/09/24 0912    cetirizine (zyrTEC) tablet 10 mg  10 mg Oral At Bedtime Brionna Robertson DO   10 mg at 04/08/24 2103    enoxaparin ANTICOAGULANT (LOVENOX) injection 40 mg  40 mg Subcutaneous Q12H Xena Ramirez MD   40 mg at 04/09/24 0911    ferrous sulfate (FEROSUL) tablet 325 mg  325 mg Oral Daily with breakfast Melida Cedillo MD   325 mg at 04/09/24 0911    montelukast (SINGULAIR) tablet 10 mg  10 mg Oral QPM Melida Cedillo MD   10 mg at 04/08/24 2047    norethindrone (AYGESTIN) tablet 5 mg  5 mg Oral Daily Melida Cedillo MD   5 mg at 04/09/24 0911    pantoprazole (PROTONIX) EC tablet 40 mg  40 mg Oral Daily Melida Cedillo MD   40 mg at 04/09/24 0911    pregabalin (LYRICA) capsule 100 mg  100 mg Oral QAM Melida Cedillo MD   100 mg at 04/09/24 0911    pregabalin (LYRICA) capsule 200 mg  200 mg Oral At Bedtime Brionna Robertson DO   200 mg at 04/08/24 2047    psyllium (METAMUCIL/KONSYL) Packet 1 packet  1 packet Oral Daily Melida Cedillo MD   1 packet at 04/09/24 0907    Semaglutide-Weight Management (WEGOVY) pen SOAJ 1 mg  1 mg Subcutaneous Weekly Brionna Robertson DO   1 mg at 04/07/24 0949    sodium chloride (PF) 0.9% PF flush 3 mL  3 mL Intracatheter Q8H Melida Cedillo MD   3 mL at 04/08/24 1334    vitamin D3 (CHOLECALCIFEROL) tablet 50 mcg  50 mcg Oral Daily Melida Cedillo MD   50 mcg at 04/09/24 0911       Data   Recent Labs   Lab 04/08/24  0604 04/07/24  1823 04/07/24  0638 04/04/24  0557 04/04/24  0010   WBC  --   --  8.0  --  14.1*   HGB  --   --  11.9  --  11.6*   MCV  --   --  92  --  89   PLT  --   --  139*  --  161   INR 1.23*  --   --   --  1.34*   NA  --   --  143 142  --    POTASSIUM  --   --  4.1 3.8  --    CHLORIDE  --   --  109* 109*  --    CO2  --   --  26 24  --    BUN  --   --  8.3 16.5  --    CR  --   --  0.52 0.62  --    ANIONGAP   --   --  8 9  --    KELBY  --   --  8.8 8.3*  --    GLC  --   --  116* 117*  --    ALBUMIN  --  4.2  --  3.5  --    PROTTOTAL  --  5.5*  --   --   --    BILITOTAL  --  0.4  --   --   --    ALKPHOS  --  37*  --   --   --    ALT  --  14  --   --   --    AST  --  19  --   --   --        Recent Results (from the past 24 hour(s))   MR Brain w/o & w Contrast    Narrative    EXAM: MR BRAIN W/O and W CONTRAST  LOCATION: [facility]  DATE: 4/8/2024    INDICATION: New postural headache; headache; neurologic deficit, nontraumatic; weakness. Neurologic deficit onset >24 hours. No known automatically detected potential contraindications to imaging.  COMPARISON: Head CT 02/01/2024, MRI brain 10/12/2023.  CONTRAST: 11 ML GADAVIST.  TECHNIQUE: Routine multiplanar multisequence head MRI without and with intravenous contrast.    FINDINGS:  INTRACRANIAL CONTENTS: No acute or subacute infarct. No mass, acute hemorrhage, or extra-axial fluid collections. Scattered nonspecific foci of T2/FLAIR hyperintense signal in the cerebral white matter. Normal ventricles and sulci. Normal position of the   cerebellar tonsils. There is a small developmental venous anomaly in the left frontal lobe.    SELLA: Empty sella morphology with flattening of the pituitary gland along the sellar floor.    OSSEOUS STRUCTURES/SOFT TISSUES: Normal marrow signal. The major intracranial vascular flow voids are maintained.     ORBITS: No abnormality accounting for technique.     SINUSES/MASTOIDS: 3 cm polyp or retention cyst in the right maxillary sinus. No middle ear or mastoid effusion.       Impression    IMPRESSION:  1.  No recent infarct, intracranial mass, pathologic enhancement or evidence of intracranial hemorrhage.  2.  Scattered nonspecific foci of nonenhancing T2 signal hyperintensity in the supratentorial white matter in a random distribution. Differential considerations include foci of nonspecific gliosis or chronic myelin loss,  sequelae of chronic headaches and   age-advanced chronic small vessel ischemic changes. The appearance is unchanged compared with the brain MRI of 10/12/2023.         Assessment & Plan   Acute bilateral lower extremity paralysis dt CIDP flare, s/p PLEX starting 4/2  CIDP (chronic inflammatory demyelinating polyneuropathy) (H) (4/1/2024)  Chronic bilateral LE neuropathy, sec to above    *Has history of CIDP with peripheral neuropathy.  She underwent treatments with IVIG in the past.  *Initially presented to [facility] ED on 3/30/2024 for evaluation of new onset back pain and bilateral lower extremity weakness (mostly experiences numbness, so weakness was a new symptom for her).  She was unable to ambulate.  *In the ED, labs were generally unremarkable.  General neurology was consulted.  She underwent further evaluation with MRI of the thoracic and lumbar spines.  MRI of the thoracic spine was unremarkable.  MRI lumbar spine showed enlargement of the cauda equina nerve roots and enhancement which was suggestive of CIDP per neurology.  She will underwent a lumbar puncture which showed elevated protein and albumin cytologic dissociation which confirmed the diagnosis of CIDP.  She was given 1 g of IV Solu-Medrol and transferred to [facility] to initiate plasma exchange. PICC line was placed.  *Tunneled catheter placed per IR on 4/2 and patient underwent her first round of plasma exchange later that day.  04/05: P.o. Dilaudid dosage was adjusted due to pain at catheter site.  Cervical spine MRI ordered per neurology was not completed due to claustrophobia  04/06: MRI completed with IV Ativan, underwent third Plex treatment    -- ongoing management per neurology  -- Continue PLEX per nephrology; plan for treatment every other day, treatment #4 on 4/8, plan is for 7 treatments total.  Plan on fifth treatment on 4/10/2024  -- Continue routine neurochecks and fall precautions  -- Continue PTA Lyrica (100mg in AM  "and 200mg HS)  -- PT/OT following, recommending TCU vs ARU  -- As needed Dilaudid 2 mg for moderate pain every 4 hours and 3 mg for severe pain.  -- Neurology has been following, highly appreciate their help  -- MRI of the cervical spine with and without contrast completed last evening 04/06 due to new right upper extremity weakness showing no abnormal signal or enhancement within the cervical cord.  DDD changes at C5-C6 levels were noticed.  -- MRI brain ordered in the setting of ongoing headache.  Did order caffeine 200 mg daily since she said headache started after LP.  -- Bilateral lower extremity DVT ultrasound done for swelling.  Negative.    Vitamin B12 deficiency  -Vitamin B12 level less than 150.  Methylmalonic acid level pending.  -One-time dose of 1000 mcg IM given on 4/8/2024.  Follow-up with outpatient PCP for further dosing.  -Patient has elevated homocystine level at 17.1 which can be seen with vitamin B12 deficiency.  This should be rechecked in 1 month      Anxiety  Depression  Bipolar disorder  Chronic pain   Hx suspected medication seeking behavior  Frequent medical noncompliance  Frequent utilization of healthcare system  *Complicated psychiatric history.  Patient has a guardian and noted ED treatment plan.  She stated to admitting provider that she may be able to come off her guardianship as \"she is doing really well\" and she was planning to move out of her group home and back to an apartment.  *Psychiatry was consulted while she was at [facility], she was seen by DEC counselor while in the ED.  [place] that she was medically cleared to discharge once medical issues had stabilized.    -- Continue on Lyrica as above and Klonopin as needed.  -- As above, oral Dilaudid has been ordered.  -- She is stable from a mental health standpoint.  Will consider psychiatry consultation if she worsens.     Hx self injurious behavior w/foreign body ingestion  *Hx of 47 endoscopies in last 4 years for foreign body " "extractions ([place], mickie hair pins, etc) so far during this hospital stay there has no noted self injurious behavior     Chronic abdominal pain  *Noted subacute/chronic issue. Seen at [facility] on 3/29/24 and had normal EGD then left AMA; path report negative for dysplasia, gastritis, or Helicobacter.  *Abx xray 3/31 was nl.   *Chronic and stable on PPI.      Morbid obesity  *Is on Wegovy, reports weight loss of 60 pounds recently. [place] to receive this medication while in the hospital, was given on 3/31.  *Will continue weekly on Sundays while hospitalized, brought in home supply to use.     ZOHRA  *Brought in home CPAP to use on 4/3.     Elevated INR  -INR at 1.23.  Unclear etiology.  LFTs within normal range  -Could have fatty liver in the setting of obesity    Clinically Significant Risk Factors               # Coagulation Defect: INR = 1.23 (Ref range: 0.85 - 1.15) and/or PTT = 31 Seconds (Ref range: 22 - 38 Seconds), will monitor for bleeding           # Severe Obesity: Estimated body mass index is 46.09 kg/m  as calculated from the following:    Height as of this encounter: 1.575 m (5' 2\").    Weight as of this encounter: 114.3 kg (252 lb).        # Financial/Environmental Concerns: none          DVT Prophylaxis: Lovenox 40 SQ twice daily  Code Status: Full Code  Disposition: Expected discharge after completing Plex therapy.  Patient will acute rehab discharge    Greater than 35 minutes spent on documentation review, imaging review, discussing care at bedside with the nurse and examination    Xena Ramirez MD, MD  [phone](p)   "

## 2024-04-09 NOTE — PLAN OF CARE
Reason for Admission: CIDP flare up    Cognitive/Mentation: A/Ox 4  Neuros/CMS: Intact ex BLE weakness 2/5. Sensation absent in bilateral toes, baseline per pt.  VS: stable on RA.   GI/: continent. Uses purwick overnight  Pulmonary: LS clear. CPAP overnight  Pain: HA and back pain. PRN tylenol and oral dilaudid.     Drains/Lines: R tunneled CVC. L triple lumen PICC.   Skin: intact  Activity: Assist x 2 with GBW.  Diet: regular with thin liquids. Takes pills whole.     Discharge: back to group home, pending medical readiness.    Aggression Stoplight Tool: green    End of shift summary: continue with PLEX treatment tomorrow 4/10.

## 2024-04-09 NOTE — PLAN OF CARE
Goal Outcome Evaluation:      Plan of Care Reviewed With: patient    Overall Patient Progress: no changeOverall Patient Progress: no change         Pt here with CIPD flare up. A&O x4. Neuros BLE weakness 2/5, baseline neuropathy. VSS on RA. Regular diet, thin liquids. Takes pills whole. Up with Ax1 SS. C/O pain in back/hip/bottom, PRN dilauded given x2 and tylenol given x1. Pt scoring green on the Aggression Stop Light Tool. MRI completed Plan to continue PLEX treatments. Discharge pending.

## 2024-04-09 NOTE — PLAN OF CARE
Reason for Admission: CIPD flare up    Cognitive/Mentation: A/Ox 4  Neuros/CMS: Intact ex BLE 2/5. Baseline neuropathy, LE trace edema  VS: stable on RA.  GI: Continent.  : continent. purwick  Pulmonary: LS clear. CPAP overnight.  Pain: HA and back pain. PRN tylenol and dilaudid.     Drains/Lines: tunneled cath R chest. L triple lumen PICC line.   Skin: intact, scattered bruising  Activity: Assist x 1 with sera steady.  Diet: regular with thin liquids. Takes pills whole.     Discharge: pending. Back to group home    Aggression Stoplight Tool: green    End of shift summary: Pt completed PLEX treatment this am. Next one is 4/10. BLE US completed. MRI brain pending. DND overnight

## 2024-04-09 NOTE — PLAN OF CARE
Goal Outcome Evaluation:      Plan of Care Reviewed With: patient    Overall Patient Progress: no changeOverall Patient Progress: no change    Outcome Evaluation: PO - >75% of meal trays TID and daily supplement    - Encouraged po intake, emphasizing the importance of protein to preserve lean muscle mass    - Continue daily Ensure Max and updated flavor preferences

## 2024-04-10 ENCOUNTER — APPOINTMENT (OUTPATIENT)
Dept: OCCUPATIONAL THERAPY | Facility: CLINIC | Age: 34
DRG: 074 | End: 2024-04-10
Attending: HOSPITALIST
Payer: COMMERCIAL

## 2024-04-10 LAB
CREAT SERPL-MCNC: 0.54 MG/DL (ref 0.51–0.95)
EGFRCR SERPLBLD CKD-EPI 2021: >90 ML/MIN/1.73M2
PLATELET # BLD AUTO: 165 10E3/UL (ref 150–450)

## 2024-04-10 PROCEDURE — 97530 THERAPEUTIC ACTIVITIES: CPT | Mod: GO

## 2024-04-10 PROCEDURE — 250N000013 HC RX MED GY IP 250 OP 250 PS 637: Performed by: INTERNAL MEDICINE

## 2024-04-10 PROCEDURE — 250N000009 HC RX 250: Performed by: INTERNAL MEDICINE

## 2024-04-10 PROCEDURE — P9045 ALBUMIN (HUMAN), 5%, 250 ML: HCPCS | Mod: JZ | Performed by: INTERNAL MEDICINE

## 2024-04-10 PROCEDURE — 120N000001 HC R&B MED SURG/OB

## 2024-04-10 PROCEDURE — 99232 SBSQ HOSP IP/OBS MODERATE 35: CPT | Performed by: HOSPITALIST

## 2024-04-10 PROCEDURE — 250N000013 HC RX MED GY IP 250 OP 250 PS 637: Performed by: HOSPITALIST

## 2024-04-10 PROCEDURE — 97535 SELF CARE MNGMENT TRAINING: CPT | Mod: GO

## 2024-04-10 PROCEDURE — 250N000011 HC RX IP 250 OP 636: Performed by: HOSPITALIST

## 2024-04-10 PROCEDURE — 82565 ASSAY OF CREATININE: CPT | Performed by: INTERNAL MEDICINE

## 2024-04-10 PROCEDURE — 250N000011 HC RX IP 250 OP 636: Mod: JZ | Performed by: INTERNAL MEDICINE

## 2024-04-10 PROCEDURE — 99232 SBSQ HOSP IP/OBS MODERATE 35: CPT | Performed by: INTERNAL MEDICINE

## 2024-04-10 PROCEDURE — 258N000003 HC RX IP 258 OP 636: Performed by: INTERNAL MEDICINE

## 2024-04-10 PROCEDURE — 85049 AUTOMATED PLATELET COUNT: CPT | Performed by: INTERNAL MEDICINE

## 2024-04-10 PROCEDURE — 36514 APHERESIS PLASMA: CPT

## 2024-04-10 PROCEDURE — 250N000011 HC RX IP 250 OP 636: Performed by: INTERNAL MEDICINE

## 2024-04-10 RX ORDER — BUTALBITAL, ACETAMINOPHEN AND CAFFEINE 50; 325; 40 MG/1; MG/1; MG/1
1 TABLET ORAL EVERY 4 HOURS PRN
Status: DISCONTINUED | OUTPATIENT
Start: 2024-04-10 | End: 2024-04-16 | Stop reason: HOSPADM

## 2024-04-10 RX ORDER — LIDOCAINE 4 G/G
1 PATCH TOPICAL
Status: DISCONTINUED | OUTPATIENT
Start: 2024-04-11 | End: 2024-04-15

## 2024-04-10 RX ORDER — DIPHENHYDRAMINE HYDROCHLORIDE 50 MG/ML
50 INJECTION INTRAMUSCULAR; INTRAVENOUS
Status: COMPLETED | OUTPATIENT
Start: 2024-04-10 | End: 2024-04-12

## 2024-04-10 RX ORDER — ALBUMIN HUMAN 25 %
3000 INTRAVENOUS SOLUTION INTRAVENOUS ONCE
Status: COMPLETED | OUTPATIENT
Start: 2024-04-10 | End: 2024-04-10

## 2024-04-10 RX ADMIN — Medication 50 MCG: at 08:21

## 2024-04-10 RX ADMIN — CYCLOBENZAPRINE 10 MG: 10 TABLET, FILM COATED ORAL at 15:19

## 2024-04-10 RX ADMIN — ENOXAPARIN SODIUM 40 MG: 40 INJECTION SUBCUTANEOUS at 20:08

## 2024-04-10 RX ADMIN — ACETAMINOPHEN 1000 MG: 500 TABLET, FILM COATED ORAL at 12:16

## 2024-04-10 RX ADMIN — MONTELUKAST 10 MG: 10 TABLET, FILM COATED ORAL at 20:09

## 2024-04-10 RX ADMIN — PREGABALIN 100 MG: 100 CAPSULE ORAL at 08:21

## 2024-04-10 RX ADMIN — ACETAMINOPHEN 1000 MG: 500 TABLET, FILM COATED ORAL at 06:09

## 2024-04-10 RX ADMIN — PSYLLIUM HUSK 1 PACKET: 3.4 POWDER ORAL at 11:50

## 2024-04-10 RX ADMIN — PREGABALIN 200 MG: 100 CAPSULE ORAL at 20:09

## 2024-04-10 RX ADMIN — CETIRIZINE HYDROCHLORIDE 10 MG: 10 TABLET, FILM COATED ORAL at 20:09

## 2024-04-10 RX ADMIN — HYDROMORPHONE HYDROCHLORIDE 3 MG: 2 TABLET ORAL at 08:22

## 2024-04-10 RX ADMIN — ALBUMIN HUMAN 3000 ML: 0.05 INJECTION, SOLUTION INTRAVENOUS at 12:12

## 2024-04-10 RX ADMIN — HYDROMORPHONE HYDROCHLORIDE 3 MG: 2 TABLET ORAL at 16:49

## 2024-04-10 RX ADMIN — PANTOPRAZOLE SODIUM 40 MG: 40 TABLET, DELAYED RELEASE ORAL at 08:21

## 2024-04-10 RX ADMIN — HYDROMORPHONE HYDROCHLORIDE 0.3 MG: 1 INJECTION, SOLUTION INTRAMUSCULAR; INTRAVENOUS; SUBCUTANEOUS at 22:26

## 2024-04-10 RX ADMIN — BUTALBITAL, ACETAMINOPHEN AND CAFFEINE 1 TABLET: 50; 325; 40 TABLET ORAL at 15:19

## 2024-04-10 RX ADMIN — Medication 200 MG: at 08:21

## 2024-04-10 RX ADMIN — CALCIUM GLUCONATE 3 G: 98 INJECTION, SOLUTION INTRAVENOUS at 12:13

## 2024-04-10 RX ADMIN — ANTICOAGULANT CITRATE DEXTROSE SOLUTION FORMULA A 1000 ML: 12.25; 11; 3.65 SOLUTION INTRAVENOUS at 12:12

## 2024-04-10 RX ADMIN — ENOXAPARIN SODIUM 40 MG: 40 INJECTION SUBCUTANEOUS at 08:21

## 2024-04-10 RX ADMIN — NORETHINDRONE ACETATE 5 MG: 5 TABLET ORAL at 08:21

## 2024-04-10 RX ADMIN — FERROUS SULFATE TAB 325 MG (65 MG ELEMENTAL FE) 325 MG: 325 (65 FE) TAB at 08:21

## 2024-04-10 RX ADMIN — HYDROMORPHONE HYDROCHLORIDE 0.3 MG: 1 INJECTION, SOLUTION INTRAMUSCULAR; INTRAVENOUS; SUBCUTANEOUS at 02:37

## 2024-04-10 ASSESSMENT — ACTIVITIES OF DAILY LIVING (ADL)
ADLS_ACUITY_SCORE: 52
ADLS_ACUITY_SCORE: 51
ADLS_ACUITY_SCORE: 51
ADLS_ACUITY_SCORE: 52
ADLS_ACUITY_SCORE: 52
ADLS_ACUITY_SCORE: 51
ADLS_ACUITY_SCORE: 52
ADLS_ACUITY_SCORE: 52
ADLS_ACUITY_SCORE: 56
ADLS_ACUITY_SCORE: 52
ADLS_ACUITY_SCORE: 51
ADLS_ACUITY_SCORE: 52
ADLS_ACUITY_SCORE: 51
ADLS_ACUITY_SCORE: 56
ADLS_ACUITY_SCORE: 52
ADLS_ACUITY_SCORE: 51
ADLS_ACUITY_SCORE: 51
ADLS_ACUITY_SCORE: 52
ADLS_ACUITY_SCORE: 51
ADLS_ACUITY_SCORE: 52
ADLS_ACUITY_SCORE: 51
ADLS_ACUITY_SCORE: 52
ADLS_ACUITY_SCORE: 52

## 2024-04-10 NOTE — PROGRESS NOTES
Care Management Follow Up    Length of Stay (days): 9    Expected Discharge Date: 04/15/2024     Concerns to be Addressed:       Patient plan of care discussed at interdisciplinary rounds: Yes    Anticipated Discharge Disposition:  ARU     Anticipated Discharge Services:    Anticipated Discharge DME:      Patient/family educated on Medicare website which has current facility and service quality ratings:    Education Provided on the Discharge Plan:    Patient/Family in Agreement with the Plan:      Referrals Placed by CM/SW: External Care Coordination  Private pay costs discussed: Not applicable    Additional Information:  Patient requested to speak with CC to discuss ARU planning.  Met with patient.  She prefers a facility closer to where she lives.  She would like referral sent to Yusra Solano at  [facility].  She would like her guardian updated about discharge planning.  Referral sent.  Left VM for guardian Judith to return call.    Lola Elise RN, BSN, PHN  Inpatient Care Coordination  [facility]  Phone: [phone]

## 2024-04-10 NOTE — PLAN OF CARE
Lilian Ambriz RN on 4/9/2024 from [id number]    Reason for Admission: CIDP    Cognitive/Mentation: A&O x4  Neuros/CMS: Intact ex BLE weakness 2/5, absent sensation in toes at baseline   VS: Stable on RA  Tele: N/A  GI: Continent   : Continent, uses purwick at night   Pulmonary: LS clear   Pain: HA and back/spine pain. PRN IV Dilaudid given.     Drains/Lines: R tunneled CVC and L triple lumen PICC.   Skin: Intact   Activity: Assist x2 GBW  Diet: Regular with thin liquids. Takes pills whole with water.     Therapies recs: group home   Discharge: pending, when medically ready    Aggression Spotlight Tool: green    End of shift summary: Pt had some nausea this evening, PRN zofran given. Continue with PLEX tx tomorrow 4/10.

## 2024-04-10 NOTE — PROGRESS NOTES
Brief Note    Patient not in room x 2 today, will follow up tomorrow to evaluate for any continued improvement in strength from PLEX      Zurdo Giang MD

## 2024-04-10 NOTE — PLAN OF CARE
Reason for Admission: CIDP flare up    Cognitive/Mentation: A&O x4  Neuros/CMS: Intact ex BLE weakness 2/5, absent sensation in bilateral toes at baseline   VS: Stable on RA  GI: Continent   : Continent, uses purwick at night   Pulmonary: LS clear. CPAP overnight  Pain: HA and back pain. PRN oral dilaudid and tylenol given.    Drains/Lines: R tunneled CVC and L triple lumen PICC.   Skin: Intact   Activity: Assist x2 GBW or assist of 1 with sera steady  Diet: Regular with thin liquids. Takes pills whole with water.     Discharge: ARU pending, when medically ready    Aggression Spotlight Tool: green    End of shift summary: pt completed PLEX treatment 5/7 today, next treatment scheduled for 4/12.

## 2024-04-10 NOTE — PROGRESS NOTES
[facility]    Hospitalist Progress Note    Interval History   Patient is awake and alert.  Continuing to show slow improvement with lower extremity weakness.  Was quite tearful about her severe headaches and neck and low back pain.  I had a prolonged discussion with the patient at bedside regarding her concerns about her pain and also documentation about possible functional aspect to her symptoms.  Explained the rationale for the current pain regimen.    -Data reviewed today: I reviewed all new labs and imaging results over the last 24 hours. I personally reviewed the mr cervical spine  image(s) showing no abnormal signal . Ddd changes at c5-c6 with loss of disc height  .    Physical Exam   Temp: 98.9  F (37.2  C) Temp src: Oral BP: 133/78 Pulse: 95   Resp: 17 SpO2: 98 % O2 Device: None (Room air)    Vitals:    04/06/24 1315   Weight: 114.3 kg (252 lb)     Vital Signs with Ranges  Temp:  [97.4  F (36.3  C)-98.9  F (37.2  C)] 98.9  F (37.2  C)  Pulse:  [] 95  Resp:  [15-18] 17  BP: (111-137)/(65-93) 133/78  SpO2:  [97 %-99 %] 98 %  I/O last 3 completed shifts:  In: 730 [P.O.:730]  Out: 1650 [Urine:1650]    Physical Exam  Constitutional:       Appearance: Normal appearance. She is obese.   Cardiovascular:      Rate and Rhythm: Normal rate and regular rhythm.      Pulses: Normal pulses.      Heart sounds: Normal heart sounds.   Pulmonary:      Effort: Pulmonary effort is normal. No respiratory distress.      Breath sounds: Normal breath sounds.   Abdominal:      General: Abdomen is flat. Bowel sounds are normal. There is no distension.      Tenderness: There is no abdominal tenderness. There is no guarding.   Skin:     General: Skin is warm and dry.   Neurological:      General: No focal deficit present.      Comments: 2/5 strength in bilateral lower extremities . Needs to use serasteady to move.            Medications   Current Facility-Administered Medications   Medication Dose Route  Frequency Provider Last Rate Last Admin     Current Facility-Administered Medications   Medication Dose Route Frequency Provider Last Rate Last Admin    cetirizine (zyrTEC) tablet 10 mg  10 mg Oral At Bedtime Brionna Robertson DO   10 mg at 04/09/24 2042    enoxaparin ANTICOAGULANT (LOVENOX) injection 40 mg  40 mg Subcutaneous Q12H Xena Ramirez MD   40 mg at 04/10/24 0821    ferrous sulfate (FEROSUL) tablet 325 mg  325 mg Oral Daily with breakfast Melida Cedillo MD   325 mg at 04/10/24 0821    [START ON 4/11/2024] Lidocaine (LIDOCARE) 4 % Patch 1 patch  1 patch Transdermal Q24H Xena Ramirez MD        montelukast (SINGULAIR) tablet 10 mg  10 mg Oral QPM Melida Cedillo MD   10 mg at 04/09/24 2040    norethindrone (AYGESTIN) tablet 5 mg  5 mg Oral Daily Melida Cedillo MD   5 mg at 04/10/24 0821    pantoprazole (PROTONIX) EC tablet 40 mg  40 mg Oral Daily Melida Cedillo MD   40 mg at 04/10/24 0821    pregabalin (LYRICA) capsule 100 mg  100 mg Oral QAM Melida Cedillo MD   100 mg at 04/10/24 0821    pregabalin (LYRICA) capsule 200 mg  200 mg Oral At Bedtime Brionna Robertson DO   200 mg at 04/09/24 2040    psyllium (METAMUCIL/KONSYL) Packet 1 packet  1 packet Oral Daily Melida Cedillo MD   1 packet at 04/10/24 1150    Semaglutide-Weight Management (WEGOVY) pen SOAJ 1 mg  1 mg Subcutaneous Weekly Brionna Robertson DO   1 mg at 04/07/24 0949    sodium chloride (PF) 0.9% PF flush 3 mL  3 mL Intracatheter Q8H Melida Cedillo MD   3 mL at 04/10/24 0624    vitamin D3 (CHOLECALCIFEROL) tablet 50 mcg  50 mcg Oral Daily Melida Cedillo MD   50 mcg at 04/10/24 0821       Data   Recent Labs   Lab 04/10/24  1157 04/08/24  0604 04/07/24  1823 04/07/24  0638 04/04/24  0557 04/04/24  0010   WBC  --   --   --  8.0  --  14.1*   HGB  --   --   --  11.9  --  11.6*   MCV  --   --   --  92  --  89     --   --  139*  --  161   INR  --  1.23*  --    --   --  1.34*   NA  --   --   --  143 142  --    POTASSIUM  --   --   --  4.1 3.8  --    CHLORIDE  --   --   --  109* 109*  --    CO2  --   --   --  26 24  --    BUN  --   --   --  8.3 16.5  --    CR 0.54  --   --  0.52 0.62  --    ANIONGAP  --   --   --  8 9  --    KELBY  --   --   --  8.8 8.3*  --    GLC  --   --   --  116* 117*  --    ALBUMIN  --   --  4.2  --  3.5  --    PROTTOTAL  --   --  5.5*  --   --   --    BILITOTAL  --   --  0.4  --   --   --    ALKPHOS  --   --  37*  --   --   --    ALT  --   --  14  --   --   --    AST  --   --  19  --   --   --        No results found for this or any previous visit (from the past 24 hour(s)).        Assessment & Plan   Acute bilateral lower extremity paralysis dt CIDP flare, s/p PLEX starting 4/2  CIDP (chronic inflammatory demyelinating polyneuropathy) (H) (4/1/2024)  Chronic bilateral LE neuropathy, sec to above    *Has history of CIDP with peripheral neuropathy.  She underwent treatments with IVIG in the past.  *Initially presented to [facility] ED on 3/30/2024 for evaluation of new onset back pain and bilateral lower extremity weakness (mostly experiences numbness, so weakness was a new symptom for her).  She was unable to ambulate.  *In the ED, labs were generally unremarkable.  General neurology was consulted.  She underwent further evaluation with MRI of the thoracic and lumbar spines.  MRI of the thoracic spine was unremarkable.  MRI lumbar spine showed enlargement of the cauda equina nerve roots and enhancement which was suggestive of CIDP per neurology.  She will underwent a lumbar puncture which showed elevated protein and albumin cytologic dissociation which confirmed the diagnosis of CIDP.  She was given 1 g of IV Solu-Medrol and transferred to [facility] to initiate plasma exchange. PICC line was placed.  *Tunneled catheter placed per IR on 4/2 and patient underwent her first round of plasma exchange later that day.  04/05: P.o. Dilaudid  dosage was adjusted due to pain at catheter site.  Cervical spine MRI ordered per neurology was not completed due to claustrophobia  04/06: MRI completed with IV Ativan, underwent third Plex treatment    -- ongoing management per neurology  -- Continue PLEX per nephrology; plan for treatment every other day, treatment #5 on 4/10, plan is for 7 treatments total.  Plan on f sixth treatment on 4/12/2024  -- Continue routine neurochecks and fall precautions  -- Continue PTA Lyrica (100mg in AM and 200mg HS)  -- PT/OT following, recommending TCU vs ARU  -- As needed Dilaudid 2 mg for moderate pain every 4 hours and 3 mg for severe pain.  -- Neurology has been following, highly appreciate their help  -- MRI of the cervical spine with and without contrast completed last evening 04/06 due to new right upper extremity weakness showing no abnormal signal or enhancement within the cervical cord.  DDD changes at C5-C6 levels were noticed.  Shallow chronic central/right paracentral disc bulge that attenuates the anterior aspect of the subarachnoid spaces and causes subtle contrast of the anterior aspect of the cervical cord as seen in the previous MRI in 2021  -- MRI brain ordered in the setting of ongoing headache showed no recent infarct, mass or hemorrhage.  Scattered nonspecific nonenhancing T2 signal hyperintensity in the supratentorial white matter.  Chronic small vessel ischemic changes.  Unchanged compared to prior MRI in 2023.  .  Did order caffeine 200 mg daily since she said headache started after LP.  This did not help.  Discontinued.  -- Bilateral lower extremity DVT ultrasound done for swelling.  Negative.    Vitamin B12 deficiency  -Vitamin B12 level less than 150.  Methylmalonic acid level pending.  -One-time dose of 1000 mcg IM given on 4/8/2024.  Follow-up with outpatient PCP for further dosing.  -Patient has elevated homocystine level at 17.1 which can be seen with vitamin B12 deficiency.  This should be  "rechecked in 1 month      Anxiety  Depression  Bipolar disorder  Chronic pain   Hx suspected medication seeking behavior  Frequent medical noncompliance  Frequent utilization of healthcare system  *Complicated psychiatric history.  Patient has a guardian and noted ED treatment plan.  She stated to admitting provider that she may be able to come off her guardianship as \"she is doing really well\" and she was planning to move out of her group home and back to an apartment.  *Psychiatry was consulted while she was at [facility], she was seen by DEC counselor while in the ED.  Worden that she was medically cleared to discharge once medical issues had stabilized.    -- Continue on Lyrica as above and Klonopin as needed.  -- On as needed oral and IV Dilaudid.  --Ordered lidocaine patch for lower back pain.  Encouraged use of Flexeril for neck pain.  Also ordered ESGIC for headaches.  Question need for possible blood patch for post LP headache.  To be determined by neurology.  --Discontinued oral caffeine since that was not helping her headaches.  -- She is stable from a mental health standpoint.  Will consider psychiatry consultation if she worsens.     Hx self injurious behavior w/foreign body ingestion  *Hx of 47 endoscopies in last 4 years for foreign body extractions ([place], mickie hair pins, etc) so far during this hospital stay there has no noted self injurious behavior     Chronic abdominal pain  *Noted subacute/chronic issue. Seen at [facility] on 3/29/24 and had normal EGD then left AMA; path report negative for dysplasia, gastritis, or Helicobacter.  *Abx xray 3/31 was nl.   *Chronic and stable on PPI.      Morbid obesity  *Is on Wegovy, reports weight loss of 60 pounds recently. Adamant to receive this medication while in the hospital, was given on 3/31.  *Will continue weekly on Sundays while hospitalized, brought in home supply to use.     ZOHRA  *Brought in home CPAP to use on 4/3.     Elevated INR  -INR at 1.23. " " Unclear etiology.  LFTs within normal range  -Could have fatty liver in the setting of obesity    Clinically Significant Risk Factors               # Coagulation Defect: INR = 1.23 (Ref range: 0.85 - 1.15) and/or PTT = 31 Seconds (Ref range: 22 - 38 Seconds), will monitor for bleeding           # Severe Obesity: Estimated body mass index is 46.09 kg/m  as calculated from the following:    Height as of this encounter: 1.575 m (5' 2\").    Weight as of this encounter: 114.3 kg (252 lb).        # Financial/Environmental Concerns: none          DVT Prophylaxis: Lovenox 40 SQ twice daily  Code Status: Full Code  Disposition: Expected discharge after completing Plex therapy to acute rehab.  Social work consulted.    Greater than 35-minute spent on documentation, chart review, discussing care with patient at bedside.    Xena Ramirez MD, MD  [phone](p)   "

## 2024-04-10 NOTE — PROGRESS NOTES
Renal Medicine Progress Note            Assessment/Plan:     Indication:  CIDP     1 volume exchange with 5% albumin: 3 liters of albumin and 3 grams calcium gluconate.     Plan:  # 6th treatment on Friday and 7th treatment on Monday.      I reviewed notes from Dr. Torres and Dr. Ramirez        Interval History:     She is getting 5/7 apheresis treatment.   She says she continues to improve slowly.   No issues with apheresis procedure.          Medications and Allergies:     Current Facility-Administered Medications   Medication Dose Route Frequency Provider Last Rate Last Admin    albumin human 5 % injection 3,000 mL  3,000 mL Apheresis Once Jonh Messina MD        Anticoagulant Citrate Dextrose Formula A   500-2,000 mL Apheresis Once Jonh Messina MD        caffeine (NO-DOZE) tablet 200 mg  200 mg Oral Daily Xena Ramirez MD   200 mg at 04/10/24 0821    calcium gluconate 3 g in sodium chloride 0.9 % 80 mL  3 g Intravenous Once Jonh Messina MD        cetirizine (zyrTEC) tablet 10 mg  10 mg Oral At Bedtime Brionna Robertson DO   10 mg at 04/09/24 2042    enoxaparin ANTICOAGULANT (LOVENOX) injection 40 mg  40 mg Subcutaneous Q12H Xena Ramirez MD   40 mg at 04/10/24 0821    ferrous sulfate (FEROSUL) tablet 325 mg  325 mg Oral Daily with breakfast Melida Cedillo MD   325 mg at 04/10/24 0821    montelukast (SINGULAIR) tablet 10 mg  10 mg Oral QPM Melida Cedillo MD   10 mg at 04/09/24 2040    norethindrone (AYGESTIN) tablet 5 mg  5 mg Oral Daily Melida Cedillo MD   5 mg at 04/10/24 0821    pantoprazole (PROTONIX) EC tablet 40 mg  40 mg Oral Daily Melida Cedillo MD   40 mg at 04/10/24 0821    pregabalin (LYRICA) capsule 100 mg  100 mg Oral QAM Melida Cedillo MD   100 mg at 04/10/24 0821    pregabalin (LYRICA) capsule 200 mg  200 mg Oral At Bedtime Brionna Robertson DO   200 mg at 04/09/24 2040    psyllium (METAMUCIL/KONSYL) Packet 1 packet   1 packet Oral Daily Melida Cedillo MD   1 packet at 04/09/24 0907    Semaglutide-Weight Management (WEGOVY) pen SOAJ 1 mg  1 mg Subcutaneous Weekly Ailyn Brionna Tea, DO   1 mg at 04/07/24 0949    sodium chloride (PF) 0.9% PF flush 3 mL  3 mL Intracatheter Q8H Melida Cedillo MD   3 mL at 04/10/24 0624    vitamin D3 (CHOLECALCIFEROL) tablet 50 mcg  50 mcg Oral Daily Melida Cedillo MD   50 mcg at 04/10/24 0821        Allergies   Allergen Reactions    Amoxicillin-Pot Clavulanate Other (See Comments), Swelling and Rash     PN: facial swelling, left side. Also had cortisone injection the same day as she started the Augmentin.          Influenza Vaccines Other (See Comments) and Nausea and Vomiting     HUT Reaction: Nausea And Vomiting; HUT Reaction Type: Intolerance; HUT Severity: Low; HUT Noted: [id number]    Latex Rash           Oseltamivir Hives    Penicillins Anaphylaxis    Vancomycin Itching, Swelling and Rash     Flushed face, very itchy    Hydrocodone Nausea and Vomiting and GI Disturbance     vomiting for days    Blood-Group Specific Substance Other (See Comments)     Patient has an anti-Cw and non-specific antibodies. Blood product orders may be delayed. Draw one red top and two purple top tubes for all type/screen/crossmatch orders.  Patient has anti-Cw, anti-K ([place]), Warm auto and nonspecific antibodies. Blood products may be delayed. Draw patient 24 hours prior to transfusion. Draw one red top and two purple top tubes for all type and screen orders.    Clavulanic Acid Angioedema    Haemophilus B Polysaccharide Vaccine Nausea and Vomiting    Oxycodone Swelling    Adhesive Tape Rash     Silicone type  Adhesive allergy      Band-Aid Anti-Itch      Other reaction(s): adhesive allergy    Cephalosporins Rash    Fentanyl Itching    Lamotrigine Rash     Possibly associated with Lamictal.     Naltrexone Other (See Comments)     Reaction(s): Vivid dreams.    No Clinical Screening - See  "Comments Other (See Comments)     History of swallowing sharp metallic objects. She should not be prescribed lancets due to posed risk of swallowing.             Physical Exam:   Vitals were reviewed   , Blood pressure 129/89, pulse 93, temperature 98.1  F (36.7  C), temperature source Oral, resp. rate 15, height 1.575 m (5' 2\"), weight 114.3 kg (252 lb), last menstrual period 07/12/2023, SpO2 98%, not currently breastfeeding.    Wt Readings from Last 3 Encounters:   03/31/24 114.6 kg (252 lb 10.4 oz)   03/29/24 112.9 kg (249 lb)   03/29/24 112.9 kg (249 lb)       Intake/Output Summary (Last 24 hours) at 4/10/2024 1027  Last data filed at 4/10/2024 0637  Gross per 24 hour   Intake 480 ml   Output 1650 ml   Net -1170 ml     GENERAL APPEARANCE: pleasant, NAD, alert  HEENT:  Eyes/ears/nose/neck grossly normal  RESP: Not labored  ABDOMEN: obese, soft.   Neuro: Awake, alert and conversing normally    R TDC CDI         Data:     CBC RESULTS:     Recent Labs   Lab 04/07/24  0638 04/04/24  0010   WBC 8.0 14.1*   RBC 3.87 3.87   HGB 11.9 11.6*   HCT 35.7 34.6*   * 161       Basic Metabolic Panel:  Recent Labs   Lab 04/07/24  0638 04/04/24  0557    142   POTASSIUM 4.1 3.8   CHLORIDE 109* 109*   CO2 26 24   BUN 8.3 16.5   CR 0.52 0.62   * 117*   KELBY 8.8 8.3*       INR  Recent Labs   Lab 04/08/24  0604 04/04/24  0010   INR 1.23* 1.34*      Attestation:   I have reviewed today's relevant vital signs, notes, medications, labs and imaging.    Jonh Messina MD  [facility] - Nephrology  Office phone :[phone]  Pager: [phone]   "

## 2024-04-10 NOTE — PROGRESS NOTES
Vitally stable on RA,  A&OX4, needs met this shift, A2 mabel steady, CMS intact ex BLE weakness  2/5, and absent of sensation in toes baseline per patient,  uses pure wick at night,  denies SOB.,CP, endorsed headache and back pain ,IV dilaudid and tylenol given, R tunneled CVC and L triple lumen PICC patent, Discharge pending safe deposition plan.

## 2024-04-10 NOTE — PROGRESS NOTES
Treatment in room D40 using Optia apheresis machine. Consent verified.     Height: 5 ft 2 in  Weight: 114.3kg  HCT: 36%  Inlet speed: 75  ACDA ratio: 10:1  Fluid balance: 100  Access: right CVC.     Replacement product: 3000 mL 5% Albumin for 1 volume exchange.  Electrolyte replacement: Ca Gluconate 3 grams in NS - total 80 mls, piggybacked into return lines, infused over time of treatment.    Premedications: none     Treatment Notes: pt tolerated tx well, no complications.     Treatment completed as ordered, VSS, see EPIC flowsheet for run data.     Next treatment: Friday 4/12/2024.    Nguyen Collins RN

## 2024-04-11 ENCOUNTER — APPOINTMENT (OUTPATIENT)
Dept: PHYSICAL THERAPY | Facility: CLINIC | Age: 34
DRG: 074 | End: 2024-04-11
Attending: HOSPITALIST
Payer: COMMERCIAL

## 2024-04-11 ENCOUNTER — APPOINTMENT (OUTPATIENT)
Dept: OCCUPATIONAL THERAPY | Facility: CLINIC | Age: 34
DRG: 074 | End: 2024-04-11
Attending: HOSPITALIST
Payer: COMMERCIAL

## 2024-04-11 PROCEDURE — 97530 THERAPEUTIC ACTIVITIES: CPT | Mod: GP | Performed by: PHYSICAL THERAPIST

## 2024-04-11 PROCEDURE — 97535 SELF CARE MNGMENT TRAINING: CPT | Mod: GO

## 2024-04-11 PROCEDURE — 250N000011 HC RX IP 250 OP 636: Performed by: HOSPITALIST

## 2024-04-11 PROCEDURE — 250N000013 HC RX MED GY IP 250 OP 250 PS 637: Performed by: INTERNAL MEDICINE

## 2024-04-11 PROCEDURE — 120N000001 HC R&B MED SURG/OB

## 2024-04-11 PROCEDURE — 99232 SBSQ HOSP IP/OBS MODERATE 35: CPT | Performed by: HOSPITALIST

## 2024-04-11 PROCEDURE — 250N000013 HC RX MED GY IP 250 OP 250 PS 637: Performed by: HOSPITALIST

## 2024-04-11 PROCEDURE — 250N000011 HC RX IP 250 OP 636: Performed by: INTERNAL MEDICINE

## 2024-04-11 RX ADMIN — Medication 50 MCG: at 08:01

## 2024-04-11 RX ADMIN — DIPHENHYDRAMINE HYDROCHLORIDE 25 MG: 25 CAPSULE ORAL at 05:51

## 2024-04-11 RX ADMIN — HYDROMORPHONE HYDROCHLORIDE 0.3 MG: 1 INJECTION, SOLUTION INTRAMUSCULAR; INTRAVENOUS; SUBCUTANEOUS at 12:43

## 2024-04-11 RX ADMIN — HYDROMORPHONE HYDROCHLORIDE 0.3 MG: 1 INJECTION, SOLUTION INTRAMUSCULAR; INTRAVENOUS; SUBCUTANEOUS at 04:57

## 2024-04-11 RX ADMIN — ENOXAPARIN SODIUM 40 MG: 40 INJECTION SUBCUTANEOUS at 20:20

## 2024-04-11 RX ADMIN — ENOXAPARIN SODIUM 40 MG: 40 INJECTION SUBCUTANEOUS at 08:00

## 2024-04-11 RX ADMIN — PREGABALIN 200 MG: 100 CAPSULE ORAL at 20:19

## 2024-04-11 RX ADMIN — FERROUS SULFATE TAB 325 MG (65 MG ELEMENTAL FE) 325 MG: 325 (65 FE) TAB at 08:01

## 2024-04-11 RX ADMIN — BUTALBITAL, ACETAMINOPHEN AND CAFFEINE 1 TABLET: 50; 325; 40 TABLET ORAL at 23:23

## 2024-04-11 RX ADMIN — NORETHINDRONE ACETATE 5 MG: 5 TABLET ORAL at 08:02

## 2024-04-11 RX ADMIN — BUTALBITAL, ACETAMINOPHEN AND CAFFEINE 1 TABLET: 50; 325; 40 TABLET ORAL at 08:29

## 2024-04-11 RX ADMIN — LIDOCAINE 1 PATCH: 4 PATCH TOPICAL at 08:05

## 2024-04-11 RX ADMIN — ACETAMINOPHEN 1000 MG: 500 TABLET, FILM COATED ORAL at 03:42

## 2024-04-11 RX ADMIN — MONTELUKAST 10 MG: 10 TABLET, FILM COATED ORAL at 20:20

## 2024-04-11 RX ADMIN — LIDOCAINE 1 PATCH: 4 PATCH TOPICAL at 12:43

## 2024-04-11 RX ADMIN — CETIRIZINE HYDROCHLORIDE 10 MG: 10 TABLET, FILM COATED ORAL at 20:20

## 2024-04-11 RX ADMIN — BUTALBITAL, ACETAMINOPHEN AND CAFFEINE 1 TABLET: 50; 325; 40 TABLET ORAL at 15:35

## 2024-04-11 RX ADMIN — PSYLLIUM HUSK 1 PACKET: 3.4 POWDER ORAL at 08:00

## 2024-04-11 RX ADMIN — PANTOPRAZOLE SODIUM 40 MG: 40 TABLET, DELAYED RELEASE ORAL at 08:01

## 2024-04-11 RX ADMIN — ACETAMINOPHEN 1000 MG: 500 TABLET, FILM COATED ORAL at 12:43

## 2024-04-11 RX ADMIN — ACETAMINOPHEN 1000 MG: 500 TABLET, FILM COATED ORAL at 20:19

## 2024-04-11 RX ADMIN — HYDROMORPHONE HYDROCHLORIDE 0.3 MG: 1 INJECTION, SOLUTION INTRAMUSCULAR; INTRAVENOUS; SUBCUTANEOUS at 21:54

## 2024-04-11 RX ADMIN — PREGABALIN 100 MG: 100 CAPSULE ORAL at 08:02

## 2024-04-11 ASSESSMENT — ACTIVITIES OF DAILY LIVING (ADL)
ADLS_ACUITY_SCORE: 56
ADLS_ACUITY_SCORE: 45
ADLS_ACUITY_SCORE: 46
ADLS_ACUITY_SCORE: 55
ADLS_ACUITY_SCORE: 56
ADLS_ACUITY_SCORE: 56
ADLS_ACUITY_SCORE: 44
ADLS_ACUITY_SCORE: 45
ADLS_ACUITY_SCORE: 55
ADLS_ACUITY_SCORE: 45
ADLS_ACUITY_SCORE: 46
ADLS_ACUITY_SCORE: 45
ADLS_ACUITY_SCORE: 56
ADLS_ACUITY_SCORE: 44
ADLS_ACUITY_SCORE: 45
ADLS_ACUITY_SCORE: 56
ADLS_ACUITY_SCORE: 45
ADLS_ACUITY_SCORE: 46
ADLS_ACUITY_SCORE: 45

## 2024-04-11 NOTE — PROGRESS NOTES
[facility]    Hospitalist Progress Note    Interval History   Patient is awake and alert.  Continuing to improve with her activity levels with therapy.  Headache better controlled today.  Continuing to have significant discomfort in lower back especially with activity.    -Data reviewed today: I reviewed all new labs and imaging results over the last 24 hours. I personally reviewed the mr cervical spine  image(s) showing no abnormal signal . Ddd changes at c5-c6 with loss of disc height  .    Physical Exam   Temp: 99  F (37.2  C) Temp src: Oral BP: 136/75 Pulse: 105   Resp: 16 SpO2: 96 % O2 Device: None (Room air)    Vitals:    04/06/24 1315   Weight: 114.3 kg (252 lb)     Vital Signs with Ranges  Temp:  [98.5  F (36.9  C)-99  F (37.2  C)] 99  F (37.2  C)  Pulse:  [] 105  Resp:  [16-18] 16  BP: (125-136)/(75-85) 136/75  SpO2:  [96 %-100 %] 96 %  I/O last 3 completed shifts:  In: 16 [I.V.:16]  Out: 600 [Urine:600]    Physical Exam  Constitutional:       Appearance: Normal appearance. She is obese.   Cardiovascular:      Rate and Rhythm: Normal rate and regular rhythm.      Pulses: Normal pulses.      Heart sounds: Normal heart sounds.   Pulmonary:      Effort: Pulmonary effort is normal. No respiratory distress.      Breath sounds: Normal breath sounds.   Abdominal:      General: Abdomen is flat. Bowel sounds are normal. There is no distension.      Tenderness: There is no abdominal tenderness. There is no guarding.   Skin:     General: Skin is warm and dry.   Neurological:      General: No focal deficit present.      Comments: 2/5 strength in bilateral lower extremities . Needs to use serasteady to move.            Medications   Current Facility-Administered Medications   Medication Dose Route Frequency Provider Last Rate Last Admin     Current Facility-Administered Medications   Medication Dose Route Frequency Provider Last Rate Last Admin    cetirizine (zyrTEC) tablet 10 mg  10 mg  Oral At Bedtime Brionna Robertson DO   10 mg at 04/10/24 2009    enoxaparin ANTICOAGULANT (LOVENOX) injection 40 mg  40 mg Subcutaneous Q12H Xena Ramirez MD   40 mg at 04/11/24 0800    ferrous sulfate (FEROSUL) tablet 325 mg  325 mg Oral Daily with breakfast Melida Cedillo MD   325 mg at 04/11/24 0801    Lidocaine (LIDOCARE) 4 % Patch 1 patch  1 patch Transdermal Q24H Xena Ramirez MD   1 patch at 04/11/24 1243    montelukast (SINGULAIR) tablet 10 mg  10 mg Oral QPM Melida Cedillo MD   10 mg at 04/10/24 2009    norethindrone (AYGESTIN) tablet 5 mg  5 mg Oral Daily Melida Cedillo MD   5 mg at 04/11/24 0802    pantoprazole (PROTONIX) EC tablet 40 mg  40 mg Oral Daily Melida Cedillo MD   40 mg at 04/11/24 0801    pregabalin (LYRICA) capsule 100 mg  100 mg Oral QAM Melida Cedillo MD   100 mg at 04/11/24 0802    pregabalin (LYRICA) capsule 200 mg  200 mg Oral At Bedtime Brionna Robertson DO   200 mg at 04/10/24 2009    psyllium (METAMUCIL/KONSYL) Packet 1 packet  1 packet Oral Daily Melida Cedillo MD   1 packet at 04/11/24 0800    Semaglutide-Weight Management (WEGOVY) pen SOAJ 1 mg  1 mg Subcutaneous Weekly Brionna Robertson DO   1 mg at 04/07/24 0949    sodium chloride (PF) 0.9% PF flush 10-40 mL  10-40 mL Intracatheter Q7 Days Xena Ramirez MD        vitamin D3 (CHOLECALCIFEROL) tablet 50 mcg  50 mcg Oral Daily Melida Cedillo MD   50 mcg at 04/11/24 0801       Data   Recent Labs   Lab 04/10/24  1157 04/08/24  0604 04/07/24  1823 04/07/24  0638   WBC  --   --   --  8.0   HGB  --   --   --  11.9   MCV  --   --   --  92     --   --  139*   INR  --  1.23*  --   --    NA  --   --   --  143   POTASSIUM  --   --   --  4.1   CHLORIDE  --   --   --  109*   CO2  --   --   --  26   BUN  --   --   --  8.3   CR 0.54  --   --  0.52   ANIONGAP  --   --   --  8   KELBY  --   --   --  8.8   GLC  --   --   --  116*   ALBUMIN  --   --  4.2  --     PROTTOTAL  --   --  5.5*  --    BILITOTAL  --   --  0.4  --    ALKPHOS  --   --  37*  --    ALT  --   --  14  --    AST  --   --  19  --        No results found for this or any previous visit (from the past 24 hour(s)).        Assessment & Plan   Acute bilateral lower extremity paralysis dt CIDP flare, s/p PLEX starting 4/2  CIDP (chronic inflammatory demyelinating polyneuropathy) (H) (4/1/2024)  Chronic bilateral LE neuropathy, sec to above    *Has history of CIDP with peripheral neuropathy.  She underwent treatments with IVIG in the past.  *Initially presented to [facility] ED on 3/30/2024 for evaluation of new onset back pain and bilateral lower extremity weakness (mostly experiences numbness, so weakness was a new symptom for her).  She was unable to ambulate.  *In the ED, labs were generally unremarkable.  General neurology was consulted.  She underwent further evaluation with MRI of the thoracic and lumbar spines.  MRI of the thoracic spine was unremarkable.  MRI lumbar spine showed enlargement of the cauda equina nerve roots and enhancement which was suggestive of CIDP per neurology.  She will underwent a lumbar puncture which showed elevated protein and albumin cytologic dissociation which confirmed the diagnosis of CIDP.  She was given 1 g of IV Solu-Medrol and transferred to [facility] to initiate plasma exchange. PICC line was placed.  *Tunneled catheter placed per IR on 4/2 and patient underwent her first round of plasma exchange later that day.  04/05: P.o. Dilaudid dosage was adjusted due to pain at catheter site.  Cervical spine MRI ordered per neurology was not completed due to claustrophobia  04/06: MRI completed with IV Ativan, underwent third Plex treatment    -- ongoing management per neurology  -- Continue PLEX per nephrology; plan for treatment every other day, treatment #5 on 4/10, plan is for 7 treatments total.  Plan on  sixth treatment on 4/12/2024.  Will complete her  treatment on Monday and can be discharged to ARU after.  -- Continue routine neurochecks and fall precautions  -- Continue PTA Lyrica (100mg in AM and 200mg HS)  -- PT/OT following, recommending TCU vs ARU  -- As needed Dilaudid 2 mg for moderate pain every 4 hours and 3 mg for severe pain.  -- Neurology has been following, highly appreciate their help  -- MRI of the cervical spine with and without contrast completed last evening 04/06 due to new right upper extremity weakness showing no abnormal signal or enhancement within the cervical cord.  DDD changes at C5-C6 levels were noticed.  Shallow chronic central/right paracentral disc bulge that attenuates the anterior aspect of the subarachnoid spaces and causes subtle contrast of the anterior aspect of the cervical cord as seen in the previous MRI in 2021  -- MRI brain ordered in the setting of ongoing headache showed no recent infarct, mass or hemorrhage.  Scattered nonspecific nonenhancing T2 signal hyperintensity in the supratentorial white matter.  Chronic small vessel ischemic changes.  Unchanged compared to prior MRI in 2023.  .  Did order caffeine 200 mg daily since she said headache started after LP.  This did not help.  Discontinued.  -- Bilateral lower extremity DVT ultrasound done for swelling.  Negative.    Vitamin B12 deficiency  -Vitamin B12 level less than 150.  Methylmalonic acid level pending.  -One-time dose of 1000 mcg IM given on 4/8/2024.  Follow-up with outpatient PCP for further dosing.  -Patient has elevated homocystine level at 17.1 which can be seen with vitamin B12 deficiency.  This should be rechecked in 1 month      Anxiety  Depression  Bipolar disorder  Chronic pain   Hx suspected medication seeking behavior  Frequent medical noncompliance  Frequent utilization of healthcare system  *Complicated psychiatric history.  Patient has a guardian and noted ED treatment plan.  She stated to admitting provider that she may be able to come off  "her guardianship as \"she is doing really well\" and she was planning to move out of her group home and back to an apartment.  *Psychiatry was consulted while she was at [facility], she was seen by DEC counselor while in the ED.  [place] that she was medically cleared to discharge once medical issues had stabilized.    -- Continue on Lyrica as above and Klonopin as needed.  -- On as needed oral and IV Dilaudid.  --Ordered lidocaine patch for lower back pain.  Encouraged use of Flexeril for neck pain.  Also ordered ESGIC for headaches.  Question need for possible blood patch for post LP headache.  To be determined by neurology.  -Started on butalbital with caffeine for headaches which helped with symptom control.  -- She is stable from a mental health standpoint.  Will consider psychiatry consultation if she worsens.     Hx self injurious behavior w/foreign body ingestion  *Hx of 47 endoscopies in last 4 years for foreign body extractions ([place], mickie hair pins, etc) so far during this hospital stay there has no noted self injurious behavior     Chronic abdominal pain  *Noted subacute/chronic issue. Seen at [facility] on 3/29/24 and had normal EGD then left AMA; path report negative for dysplasia, gastritis, or Helicobacter.  *Abx xray 3/31 was nl.   *Chronic and stable on PPI.      Morbid obesity  *Is on Wegovy, reports weight loss of 60 pounds recently. Adamant to receive this medication while in the hospital, was given on 3/31.  *Will continue weekly on Sundays while hospitalized, brought in home supply to use.     ZOHRA  *Brought in home CPAP to use on 4/3.     Elevated INR  -INR at 1.23.  Unclear etiology.  LFTs within normal range  -Could have fatty liver in the setting of obesity    Clinically Significant Risk Factors                         # Severe Obesity: Estimated body mass index is 46.09 kg/m  as calculated from the following:    Height as of this encounter: 1.575 m (5' 2\").    Weight as of this encounter: " 114.3 kg (252 lb).        # Financial/Environmental Concerns: none          DVT Prophylaxis: Lovenox 40 SQ twice daily  Code Status: Full Code  Disposition: Expected discharge after completing Plex therapy to acute rehab.  Social work consulted.  Medically Ready for Discharge: Anticipated in 2-4 Days    Xena Ramirez MD, MD  [phone](p)

## 2024-04-11 NOTE — PROGRESS NOTES
"   04/11/24 1130   Appointment Info   Signing Clinician's Name / Credentials (PT) Anjelica Dos Santos, PT, DPT   Therapeutic Activity   Therapeutic Activities: dynamic activities to improve functional performance Minutes (44025) 45   Symptoms Noted During/After Treatment Fatigue;Increased pain   Treatment Detail/Skilled Intervention Greeted sitting in recliner. Amenable. Initially c/o inabiltiy to lift either L/R leg or move LEs at all, but with encouragement and educ about improved fn mobiltiy in hallway over last several PT sesssions, pt does complete SLRs, ankle DF, wiggles toes. Pt completed sit>stand at [employer] w/SBA. Brought to transport chair outside room. Completed stand>sit from SS w/SBA. At 2WW amenable to hallway amb - completed 175' amb w/SBA. Gait markedly slow ~0.1 m/s, c/o low back pain, B wrist pain. Amb w/L LE circumduction, mild shaking. No tripping occured, no knee buckling, no overt LOB. Somewhat improved quality and gait speed with counting, asking pt to ambulate icnr # of steps without taking standing rest breaks. Tolerated well, endorsing fatigue but \"I'm happy with the progress I\"ve made.\" Amb all the way back to recliner chair, compelted turn and sit w/SBA. Overall, pt did not require any physical assist over course of 45min PT session. She did benefit from VCs for safety w/2WW and encouragement, educ.   PT Discharge Planning   PT Plan Mobility progressions - amb w/LRD, progress distance, quality, speed as able   PT Discharge Recommendation (DC Rec) Acute Rehab Center-Motivated patient will benefit from intensive, interdisciplinary therapy.  Anticipate will be able to tolerate 3 hours of therapy per day;home with assist;home with home care physical therapy   PT Rationale for DC Rec Pt has been making excellent progress - is now completing transfers, and amb >150ft w/2WW and SBA. No physical assist, no overt loss of balance or knee buckling. Pt is markedly below age-normative values for " strength, mobility, gait speed - she would benefit from collaborative rehab ARU stay to maximize fn outcomes. At this time pt might be improving such that she can d/c back to prior living situation pending consistency with transfers/amb outside of PT sessions. Unclear if pt will be d/c back to her group home or elsewhere. If d/c to prior living situation, would benefit from HHPT to maximze fn outcomes.   PT Brief overview of current status SBA w/2WW - recommend Ax2 w/nursing staff w/2WW   PT Equipment Needed at Discharge   (Already owns 2WW)   Total Session Time   Timed Code Treatment Minutes 45   Total Session Time (sum of timed and untimed services) 45

## 2024-04-11 NOTE — PLAN OF CARE
Goal Outcome Evaluation:      Plan of Care Reviewed With: patient    Overall Patient Progress: improvingOverall Patient Progress: improving         Here with CIDP. Neuros stable. Bilateral LE weakness, 4/5 with legs lifted from knees up. Left top foot numbness, +1 edema both feet. Back pain managed with lido patch and prns of tylenol, tylenol mix med (ESGIC), and dilaudid IV x 1. Continent of bowel and bladder, using purewick intermittently. Regular diet, meds whole. Up with assist 1, walker, gait belt, encourage ambulation. Walked with therapies today. Plex Friday & Monday. Discharge to [facility] or [facility] when ready.

## 2024-04-11 NOTE — PLAN OF CARE
Goal Outcome Evaluation:      Plan of Care Reviewed With: patient    Overall Patient Progress: no changeOverall Patient Progress: no change         Pt here with CIDP flare up. A&O x4. Neuros BLE weakness 2/5, baseline absent sensation in bilateral toes. VSS on RA. Regular diet, thin liquids. Takes pills whole. Up with Ax1 SS or Ax2 GB/W. C/O HA/back pain, PRN dilauded given x2 and tylenol given x1. Pt scoring green on the Aggression Stop Light Tool. Plan to continue PLEX treatments. Discharge pending.

## 2024-04-11 NOTE — PLAN OF CARE
[id number]    Pt here with CIDP flare up. A&O x4. Neuros bilateral LE weakness - 4/5. VSS. Regular diet, thin liquids. Takes pills whole. Up with Ax1 GBW. Pt intermittently using purewick. Encourage ambulation. Edema +1 in bilateral LE. PRN ESCIG given for H/A. Lido patch in place for back pain. Sitter at bedside. Pt scoring green on the Aggression Stop Light Tool. Plan 2 more PLEX treatments on Friday and Monday. Discharge to [facility] or [facility].

## 2024-04-11 NOTE — PROGRESS NOTES
Neurology Progress Note      Subjective    Patient feeling ok.  Headaches seem to be a little bit better with Fioricet, she is hoping to avoid blood patch.      SA and  5/5, no antigravity HF (though somewhat technically limited).  Great toe extension 4-/5 on left and 5-/5 on right, more limited with DF        Diagnostic Work-up Review:  Vit B12 <150     MRI brain without and with Contrast (4/8/2024)  IMPRESSION:  1.  No recent infarct, intracranial mass, pathologic enhancement or evidence of intracranial hemorrhage.  2.  Scattered nonspecific foci of nonenhancing T2 signal hyperintensity in the supratentorial white matter in a random distribution. Differential considerations include foci of nonspecific gliosis or chronic myelin loss, sequelae of chronic headaches and   age-advanced chronic small vessel ischemic changes. The appearance is unchanged compared with the brain MRI of 10/12/2023.     MRI Cervical Spine without and with Contrast (4/6/2024)  IMPRESSION:  1.  No abnormal signal or enhancement within the cervical cord.     2.  DDD change at C5-C6 with loss of disc height. Shallow chronic central/right paracentral disc bulge at C5-C6 results in subtle attenuation of the anterior/right aspect of the subarachnoid space and causes subtle contact of the anterior/right aspect of   the cervical cord as seen on previous MRI scan performed 02/24/2021.        MRI Lumbar Spine without and with  Contrast (3/30/2024)  IMPRESSION:  1.  Cauda equina nerve roots appear mildly enlarged as well as the lower lumbar spine and sacral exiting nerve roots. Some of these nerve roots demonstrate thin nonnodular enhancement. Findings may reflect chronic inflammatory demyelinating   polyneuropathy, Charcot-Monique-Tooth, neurofibromatosis type I. Enhancement could suggest acute infectious inflammatory process superimposed on chronic findings.     2.  Transitional lumbosacral anatomy described above.     3.  No significant  degenerative changes.     4.  No significant thecal sac stenosis. No significant neural foraminal stenosis.        CSF   RBC - 0  WBC - 1  Protein - 104.9      Impression:  33 year old female presented from long-term with generalized weakness and low back pain 3/30/2024.  She has a history of CIDP though had not responded previously to IVIG.  MRI lumbar spine was without any revealing results though did show changes that are consistent with CIDP.  She was started on plasma exchange (she is s/p 5 cycles).  It is noted that there are functional features to her presentation that complicate the assessment.  An MRI c--spine was ordered because of the concern for new arm weakness, this was unremarkable.     Her LP did seem to be complicated by postural headaches.  MRI was negative for any signs of intracranial hypotension.  She is hoping to avoid a blood patch, which seems like it should be unnecessary.  She is up in bed and Fioricet seems to be helping (at least to some degree)    Her strength seems to be improving and so we are opting for 7 cycles of PLEX.  Again, assessment is somewhat complicated by some functional signs but subjectively and objectively there seems to be some very slow improvement.      Recommendations:  - continue PLEX, completed 5 / 7 cycles  - Agree with Fioricet  - IM B12 administered for significant deficiency, will need follow up with internal medicine for further treatment      Zurdo Giang MD  [facility] [phone]

## 2024-04-12 ENCOUNTER — APPOINTMENT (OUTPATIENT)
Dept: PHYSICAL THERAPY | Facility: CLINIC | Age: 34
DRG: 074 | End: 2024-04-12
Attending: HOSPITALIST
Payer: COMMERCIAL

## 2024-04-12 ENCOUNTER — APPOINTMENT (OUTPATIENT)
Dept: OCCUPATIONAL THERAPY | Facility: CLINIC | Age: 34
DRG: 074 | End: 2024-04-12
Attending: HOSPITALIST
Payer: COMMERCIAL

## 2024-04-12 PROCEDURE — 250N000009 HC RX 250: Performed by: INTERNAL MEDICINE

## 2024-04-12 PROCEDURE — 250N000013 HC RX MED GY IP 250 OP 250 PS 637: Performed by: INTERNAL MEDICINE

## 2024-04-12 PROCEDURE — 250N000013 HC RX MED GY IP 250 OP 250 PS 637: Performed by: HOSPITALIST

## 2024-04-12 PROCEDURE — 250N000011 HC RX IP 250 OP 636: Performed by: INTERNAL MEDICINE

## 2024-04-12 PROCEDURE — 250N000011 HC RX IP 250 OP 636: Performed by: HOSPITALIST

## 2024-04-12 PROCEDURE — 120N000001 HC R&B MED SURG/OB

## 2024-04-12 PROCEDURE — 258N000003 HC RX IP 258 OP 636: Performed by: INTERNAL MEDICINE

## 2024-04-12 PROCEDURE — 99232 SBSQ HOSP IP/OBS MODERATE 35: CPT | Performed by: HOSPITALIST

## 2024-04-12 PROCEDURE — G0463 HOSPITAL OUTPT CLINIC VISIT: HCPCS | Mod: 25

## 2024-04-12 PROCEDURE — P9045 ALBUMIN (HUMAN), 5%, 250 ML: HCPCS | Mod: JZ | Performed by: INTERNAL MEDICINE

## 2024-04-12 PROCEDURE — 36514 APHERESIS PLASMA: CPT

## 2024-04-12 PROCEDURE — 99232 SBSQ HOSP IP/OBS MODERATE 35: CPT | Performed by: INTERNAL MEDICINE

## 2024-04-12 PROCEDURE — 97530 THERAPEUTIC ACTIVITIES: CPT | Mod: GP | Performed by: PHYSICAL THERAPIST

## 2024-04-12 PROCEDURE — 999N000190 HC STATISTIC VAT ROUNDS

## 2024-04-12 PROCEDURE — 97535 SELF CARE MNGMENT TRAINING: CPT | Mod: GO

## 2024-04-12 RX ORDER — DIPHENHYDRAMINE HCL 25 MG
50 CAPSULE ORAL EVERY 6 HOURS PRN
Status: DISCONTINUED | OUTPATIENT
Start: 2024-04-12 | End: 2024-04-13

## 2024-04-12 RX ORDER — ALBUMIN HUMAN 25 %
3000 INTRAVENOUS SOLUTION INTRAVENOUS ONCE
Status: COMPLETED | OUTPATIENT
Start: 2024-04-12 | End: 2024-04-12

## 2024-04-12 RX ADMIN — BUTALBITAL, ACETAMINOPHEN AND CAFFEINE 1 TABLET: 50; 325; 40 TABLET ORAL at 11:14

## 2024-04-12 RX ADMIN — ANTICOAGULANT CITRATE DEXTROSE SOLUTION FORMULA A 1000 ML: 12.25; 11; 3.65 SOLUTION INTRAVENOUS at 09:24

## 2024-04-12 RX ADMIN — PREGABALIN 200 MG: 100 CAPSULE ORAL at 19:44

## 2024-04-12 RX ADMIN — CETIRIZINE HYDROCHLORIDE 10 MG: 10 TABLET, FILM COATED ORAL at 19:44

## 2024-04-12 RX ADMIN — DIPHENHYDRAMINE HYDROCHLORIDE 50 MG: 50 INJECTION, SOLUTION INTRAMUSCULAR; INTRAVENOUS at 19:56

## 2024-04-12 RX ADMIN — BUTALBITAL, ACETAMINOPHEN AND CAFFEINE 1 TABLET: 50; 325; 40 TABLET ORAL at 19:56

## 2024-04-12 RX ADMIN — MONTELUKAST 10 MG: 10 TABLET, FILM COATED ORAL at 19:44

## 2024-04-12 RX ADMIN — PREGABALIN 100 MG: 100 CAPSULE ORAL at 08:13

## 2024-04-12 RX ADMIN — PANTOPRAZOLE SODIUM 40 MG: 40 TABLET, DELAYED RELEASE ORAL at 08:13

## 2024-04-12 RX ADMIN — HYDROMORPHONE HYDROCHLORIDE 0.3 MG: 1 INJECTION, SOLUTION INTRAMUSCULAR; INTRAVENOUS; SUBCUTANEOUS at 05:52

## 2024-04-12 RX ADMIN — ALBUMIN HUMAN 3000 ML: 0.05 INJECTION, SOLUTION INTRAVENOUS at 09:24

## 2024-04-12 RX ADMIN — Medication 50 MCG: at 08:13

## 2024-04-12 RX ADMIN — ACETAMINOPHEN 1000 MG: 500 TABLET, FILM COATED ORAL at 08:20

## 2024-04-12 RX ADMIN — HYDROMORPHONE HYDROCHLORIDE 3 MG: 2 TABLET ORAL at 12:21

## 2024-04-12 RX ADMIN — FERROUS SULFATE TAB 325 MG (65 MG ELEMENTAL FE) 325 MG: 325 (65 FE) TAB at 08:13

## 2024-04-12 RX ADMIN — ENOXAPARIN SODIUM 40 MG: 40 INJECTION SUBCUTANEOUS at 19:56

## 2024-04-12 RX ADMIN — NORETHINDRONE ACETATE 5 MG: 5 TABLET ORAL at 08:13

## 2024-04-12 RX ADMIN — HYDROMORPHONE HYDROCHLORIDE 0.3 MG: 1 INJECTION, SOLUTION INTRAMUSCULAR; INTRAVENOUS; SUBCUTANEOUS at 16:46

## 2024-04-12 RX ADMIN — CALCIUM GLUCONATE 3 G: 98 INJECTION, SOLUTION INTRAVENOUS at 09:25

## 2024-04-12 ASSESSMENT — ACTIVITIES OF DAILY LIVING (ADL)
ADLS_ACUITY_SCORE: 45
ADLS_ACUITY_SCORE: 46
ADLS_ACUITY_SCORE: 46
ADLS_ACUITY_SCORE: 45
ADLS_ACUITY_SCORE: 46
ADLS_ACUITY_SCORE: 45
ADLS_ACUITY_SCORE: 45
ADLS_ACUITY_SCORE: 46
ADLS_ACUITY_SCORE: 45
ADLS_ACUITY_SCORE: 46
ADLS_ACUITY_SCORE: 47
ADLS_ACUITY_SCORE: 45
ADLS_ACUITY_SCORE: 46
ADLS_ACUITY_SCORE: 45
ADLS_ACUITY_SCORE: 45
ADLS_ACUITY_SCORE: 47
ADLS_ACUITY_SCORE: 46
ADLS_ACUITY_SCORE: 46
ADLS_ACUITY_SCORE: 45
ADLS_ACUITY_SCORE: 46
ADLS_ACUITY_SCORE: 47
ADLS_ACUITY_SCORE: 45
ADLS_ACUITY_SCORE: 45

## 2024-04-12 NOTE — PLAN OF CARE
Goal Outcome Evaluation:      Plan of Care Reviewed With: patient      Patient alert, oriented timed 4. Neuro's with LE weakness and numbness. Up with assist of 1, ambulates to  bathroom. Uses Purewick when in bed. On a regular diet, good appetite. Plex treatment completed this morning, next Plex treatment on Monday. Possibly discharge after this to TCU versus home.

## 2024-04-12 NOTE — PLAN OF CARE
Goal Outcome Evaluation:      Plan of Care Reviewed With: patient    Overall Patient Progress: improvingOverall Patient Progress: improving         Pt here with CIDP flare up. A&O x4. Neuros BLE weakness 2/5, baseline absent sensation in bilateral toes. VSS on RA. Regular diet, thin liquids. Takes pills whole. Up with Ax1 SS or GB/W. C/O HA/back pain, PRN dilauded given x2, tylenol given x1, and ESGIC given x1.Pt scoring green on the Aggression Stop Light Tool. Plan to continue PLEX treatments. Discharge to ARU vs TCU pending.

## 2024-04-12 NOTE — PROGRESS NOTES
Care Management Follow Up    Length of Stay (days): 11    Expected Discharge Date: 04/15/2024     Concerns to be Addressed:  discharge planning      Patient plan of care discussed at interdisciplinary rounds: Yes    Anticipated Discharge Disposition:  likely home possibly with [facility]    Referrals Placed by CM/SW: External Care Coordination  Private pay costs discussed: Not applicable    Additional Information:  SW heard back from [facility] stating that they don't have any beds until the end of next week and the patient is doing too well for [facility] level of care, so they [facility] declined[facility]. Patient continuing to do well with therapies and could potentially discharge home to group home next week after PLEX.     Jammie Avila, MSW, LGSW   Social Work   [facility]

## 2024-04-12 NOTE — PROGRESS NOTES
Renal Medicine Progress Note            Assessment/Plan:     Indication:  CIDP     1 volume exchange with 5% albumin: 3 liters of albumin and 3 grams calcium gluconate.     Plan:  # 7/7 apheresis treatment on Monday.       I reviewed notes from Dr. Torres and Dr. Ramirez        Interval History:     Afebrile. VSS.   She is getting her 6/7 apheresis treatment  No issue with procedure          Medications and Allergies:     Current Facility-Administered Medications   Medication Dose Route Frequency Provider Last Rate Last Admin    cetirizine (zyrTEC) tablet 10 mg  10 mg Oral At Bedtime Brionna Robertson DO   10 mg at 04/11/24 2020    enoxaparin ANTICOAGULANT (LOVENOX) injection 40 mg  40 mg Subcutaneous Q12H Xena Ramirez MD   40 mg at 04/11/24 2020    ferrous sulfate (FEROSUL) tablet 325 mg  325 mg Oral Daily with breakfast Melida Cedillo MD   325 mg at 04/12/24 0813    Lidocaine (LIDOCARE) 4 % Patch 1 patch  1 patch Transdermal Q24H Xena Ramirez MD   1 patch at 04/11/24 1243    montelukast (SINGULAIR) tablet 10 mg  10 mg Oral QPM Melida Cedillo MD   10 mg at 04/11/24 2020    norethindrone (AYGESTIN) tablet 5 mg  5 mg Oral Daily Melida Cedillo MD   5 mg at 04/12/24 0813    pantoprazole (PROTONIX) EC tablet 40 mg  40 mg Oral Daily Melida Cedillo MD   40 mg at 04/12/24 0813    pregabalin (LYRICA) capsule 100 mg  100 mg Oral QAM Melida Cedillo MD   100 mg at 04/12/24 0813    pregabalin (LYRICA) capsule 200 mg  200 mg Oral At Bedtime Brionna Robertson DO   200 mg at 04/11/24 2019    psyllium (METAMUCIL/KONSYL) Packet 1 packet  1 packet Oral Daily Melida Cedillo MD   1 packet at 04/11/24 0800    Semaglutide-Weight Management (WEGOVY) pen SOAJ 1 mg  1 mg Subcutaneous Weekly Brionna Robertson DO   1 mg at 04/07/24 0949    sodium chloride (PF) 0.9% PF flush 10-40 mL  10-40 mL Intracatheter Q7 Days Xena Ramirez MD   10 mL at 04/11/24  1427    vitamin D3 (CHOLECALCIFEROL) tablet 50 mcg  50 mcg Oral Daily Melida Cedillo MD   50 mcg at 04/12/24 0813        Allergies   Allergen Reactions    Amoxicillin-Pot Clavulanate Other (See Comments), Swelling and Rash     PN: facial swelling, left side. Also had cortisone injection the same day as she started the Augmentin.          Influenza Vaccines Other (See Comments) and Nausea and Vomiting     HUT Reaction: Nausea And Vomiting; HUT Reaction Type: Intolerance; HUT Severity: Low; HUT Noted: [id number]    Latex Rash           Oseltamivir Hives    Penicillins Anaphylaxis    Vancomycin Itching, Swelling and Rash     Flushed face, very itchy    Hydrocodone Nausea and Vomiting and GI Disturbance     vomiting for days    Blood-Group Specific Substance Other (See Comments)     Patient has an anti-Cw and non-specific antibodies. Blood product orders may be delayed. Draw one red top and two purple top tubes for all type/screen/crossmatch orders.  Patient has anti-Cw, anti-K (Angella), Warm auto and nonspecific antibodies. Blood products may be delayed. Draw patient 24 hours prior to transfusion. Draw one red top and two purple top tubes for all type and screen orders.    Clavulanic Acid Angioedema    Haemophilus B Polysaccharide Vaccine Nausea and Vomiting    Oxycodone Swelling    Adhesive Tape Rash     Silicone type  Adhesive allergy      Band-Aid Anti-Itch      Other reaction(s): adhesive allergy    Cephalosporins Rash    Fentanyl Itching    Lamotrigine Rash     Possibly associated with Lamictal.     Naltrexone Other (See Comments)     Reaction(s): Vivid dreams.    No Clinical Screening - See Comments Other (See Comments)     History of swallowing sharp metallic objects. She should not be prescribed lancets due to posed risk of swallowing.             Physical Exam:   Vitals were reviewed   , Blood pressure 136/76, pulse 88, temperature 98.3  F (36.8  C), temperature source Oral, resp. rate 25, height 1.575 m  "(5' 2\"), weight 114.3 kg (252 lb), last menstrual period 07/12/2023, SpO2 96%, not currently breastfeeding.    Wt Readings from Last 3 Encounters:   03/31/24 114.6 kg (252 lb 10.4 oz)   03/29/24 112.9 kg (249 lb)   03/29/24 112.9 kg (249 lb)       Intake/Output Summary (Last 24 hours) at 4/12/2024 1020  Last data filed at 4/12/2024 0659  Gross per 24 hour   Intake 240 ml   Output 1000 ml   Net -760 ml     GENERAL APPEARANCE: pleasant, NAD, alert  HEENT:  Eyes/ears/nose/neck grossly normal  RESP: Not labored  ABDOMEN: obese, soft.   Neuro: Awake, alert and conversing normally     R TDC CDI         Data:     CBC RESULTS:     Recent Labs   Lab 04/10/24  1157 04/07/24  0638   WBC  --  8.0   RBC  --  3.87   HGB  --  11.9   HCT  --  35.7    139*       Basic Metabolic Panel:  Recent Labs   Lab 04/10/24  1157 04/07/24  0638   NA  --  143   POTASSIUM  --  4.1   CHLORIDE  --  109*   CO2  --  26   BUN  --  8.3   CR 0.54 0.52   GLC  --  116*   KELBY  --  8.8       INR  Recent Labs   Lab 04/08/24  0604   INR 1.23*      Attestation:   I have reviewed today's relevant vital signs, notes, medications, labs and imaging.    Jonh Messina MD  [facility] - Nephrology  Office phone :[phone]  Pager: [phone]   "

## 2024-04-12 NOTE — PROGRESS NOTES
Neurology Progress Note      Subjective    Patient having significantly more back pain today.  Also notes a little bit of urinary hesitancy.  Otherwise no other new relevant symptoms.    She clearly is getting stronger, almost some antigravity in the RLE, with some decent (4-/5) DF, still less movement on the left side, no antigravity in the HF, DF is limited but 3+/5.  Toes down to Babinski.    In her arms, providing nearly full strength should abductors and           Diagnostic Work-up Review:    Vit B12 <150     MRI brain without and with Contrast (4/8/2024)  IMPRESSION:  1.  No recent infarct, intracranial mass, pathologic enhancement or evidence of intracranial hemorrhage.  2.  Scattered nonspecific foci of nonenhancing T2 signal hyperintensity in the supratentorial white matter in a random distribution. Differential considerations include foci of nonspecific gliosis or chronic myelin loss, sequelae of chronic headaches and   age-advanced chronic small vessel ischemic changes. The appearance is unchanged compared with the brain MRI of 10/12/2023.     MRI Cervical Spine without and with Contrast (4/6/2024)  IMPRESSION:  1.  No abnormal signal or enhancement within the cervical cord.     2.  DDD change at C5-C6 with loss of disc height. Shallow chronic central/right paracentral disc bulge at C5-C6 results in subtle attenuation of the anterior/right aspect of the subarachnoid space and causes subtle contact of the anterior/right aspect of   the cervical cord as seen on previous MRI scan performed 02/24/2021.        MRI Lumbar Spine without and with  Contrast (3/30/2024)  IMPRESSION:  1.  Cauda equina nerve roots appear mildly enlarged as well as the lower lumbar spine and sacral exiting nerve roots. Some of these nerve roots demonstrate thin nonnodular enhancement. Findings may reflect chronic inflammatory demyelinating   polyneuropathy, Charcot-Monique-Tooth, neurofibromatosis type I. Enhancement could suggest  acute infectious inflammatory process superimposed on chronic findings.     2.  Transitional lumbosacral anatomy described above.     3.  No significant degenerative changes.     4.  No significant thecal sac stenosis. No significant neural foraminal stenosis.        CSF   RBC - 0  WBC - 1  Protein - 104.9      Impression:  33 year old female presented from [facility] with generalized weakness and low back pain 3/30/2024.  She has a history of CIDP though had not responded previously to IVIG.  MRI lumbar spine was without any revealing results though did show changes that are consistent with CIDP.  She was started on plasma exchange (she is s/p 5 cycles, 6th planned for today).  It is noted that there are functional features to her presentation that complicate the assessment.  An MRI c--spine was ordered because of the concern for new arm weakness, this was unremarkable.     Her strength seems to be improving and so we are opting for 7 cycles of PLEX.  Again, assessment is somewhat complicated by some functional signs but subjectively and objectively there seems to be some very slow improvement and today especially seems to be better.    Her chief complaint today is back pain, she is in tears describing this.  The back pain is not new, was present when she first came here.  I would imagine being in bed for most of the day since admission would make this worse.  But when asked, she does endorse a little bit of urinary hesitancy, which is reported as new.  The pain seemed to worsen after the lumbar puncture.  So I think obtaining another MRI is reasonable.      Recommendations:  - continue PLEX, completed 5 / 7 cycles, 6th planned for today  - MRI L-spine with acceleration of back pain after LP and urinary hesitancy (though mild)  - IM B12 administered for significant deficiency, will need follow up with internal medicine for further treatment    I spent 41 minutes on the date of encounter with the patient and before  and after the visit on activities detailed in the above note which may include reviewing the EMR, documenting clinical information, and communicating with other health care professionals.    Zurdo Giang MD  [facility] [phone]

## 2024-04-12 NOTE — PROGRESS NOTES
04/12/24 1400   Appointment Info   Signing Clinician's Name / Credentials (PT) Anjelica Dos Santos, PT, DPT   Therapeutic Activity   Therapeutic Activities: dynamic activities to improve functional performance Minutes (30669) 40   Symptoms Noted During/After Treatment Fatigue;Increased pain   Treatment Detail/Skilled Intervention Greeted in bed. Initially refused, but amenable with gentle encouragement and education. Completed sup>sit and sit>stand pivot transfer to transport chair with SBA. Markedly slow w/all movements, but able to advance B LEs, steady. Wheeled to rehab staircase for stair negotiation. Completed 4 steps w/UE support, SBA x 2 persons.No overt LOB, no knee buckling. Pt is familiar with stair negotiation strategies including stepping with stronger (R) leg on ascent, and descending w/weaker L LE on descent.Educ provided on not staying too long with each leg on a different step even while tired, as that incr risk of falling. Pt follows educ and cmds well, is motivated. No overt LOB, no knee buckling. Finishes session with a 80' amb w/2WW, SBA. Ended on toilet, SBA throughout. Pt did not require physical assist with any fn mobility tasks including transfers, ambulation, and stairs x4.   PT Discharge Planning   PT Plan Mobility progressions - amb w/LRD, progress distance, quality, speed as able; stairs   PT Discharge Recommendation (DC Rec) home with assist;home with home care physical therapy   PT Rationale for DC Rec Pt has been making excellent progress - is now completing transfers, amb, and stairs negotiation w/SBA No physical assist, no overt loss of balance or knee buckling. Pt is markedly below age-normative values for strength, mobility, gait speed - she would benefit from HHPT to maximize fn outcomes. At this time pt appears to have made grea improvements in strength , endurance and fn mobility such that she can d/c back to prior living situation.  Unclear if pt will be d/c back to her group  home or elsewhere. If d/c to prior living situation, would benefit from HHPT to maximze fn outcomes.   PT Brief overview of current status SBA w/2WW - recommend Ax1 w/nursing staff w/2WW   Total Session Time   Timed Code Treatment Minutes 40   Total Session Time (sum of timed and untimed services) 40

## 2024-04-12 NOTE — PROGRESS NOTES
[facility]    Hospitalist Progress Note    Interval History   Patient is awake and alert.  Very tearful this morning.  Endorsing severe lower back pain.  Not able to sleep well at night due to pain.  Has been receiving multiple doses of IV Dilaudid at night but not taking any oral meds.  Continues to have slow improvement with her weakness.    -Data reviewed today: I reviewed all new labs and imaging results over the last 24 hours. I personally reviewed the mr cervical spine  image(s) showing no abnormal signal . Ddd changes at c5-c6 with loss of disc height  .    Physical Exam   Temp: 99.5  F (37.5  C) Temp src: Oral BP: 131/72 Pulse: 92   Resp: 22 SpO2: 96 % O2 Device: None (Room air)    Vitals:    04/06/24 1315   Weight: 114.3 kg (252 lb)     Vital Signs with Ranges  Temp:  [98.3  F (36.8  C)-99.5  F (37.5  C)] 99.5  F (37.5  C)  Pulse:  [] 92  Resp:  [14-30] 22  BP: (111-140)/(65-82) 131/72  SpO2:  [96 %-97 %] 96 %  I/O last 3 completed shifts:  In: 240 [P.O.:240]  Out: 1000 [Urine:1000]    Physical Exam  Constitutional:       Appearance: Normal appearance. She is obese.   Cardiovascular:      Rate and Rhythm: Normal rate and regular rhythm.      Pulses: Normal pulses.      Heart sounds: Normal heart sounds.   Pulmonary:      Effort: Pulmonary effort is normal. No respiratory distress.      Breath sounds: Normal breath sounds.   Abdominal:      General: Abdomen is flat. Bowel sounds are normal. There is no distension.      Tenderness: There is no abdominal tenderness. There is no guarding.   Skin:     General: Skin is warm and dry.   Neurological:      General: No focal deficit present.      Comments: 2/5 strength in bilateral lower extremities . Needs to use serasteady to move.            Medications   Current Facility-Administered Medications   Medication Dose Route Frequency Provider Last Rate Last Admin     Current Facility-Administered Medications   Medication Dose Route  Frequency Provider Last Rate Last Admin    cetirizine (zyrTEC) tablet 10 mg  10 mg Oral At Bedtime Brionna Robertson DO   10 mg at 04/11/24 2020    enoxaparin ANTICOAGULANT (LOVENOX) injection 40 mg  40 mg Subcutaneous Q12H Xena Ramirez MD   40 mg at 04/11/24 2020    ferrous sulfate (FEROSUL) tablet 325 mg  325 mg Oral Daily with breakfast Melida Cedillo MD   325 mg at 04/12/24 0813    Lidocaine (LIDOCARE) 4 % Patch 1 patch  1 patch Transdermal Q24H Xena Ramirez MD   1 patch at 04/11/24 1243    montelukast (SINGULAIR) tablet 10 mg  10 mg Oral QPM Melida Cedillo MD   10 mg at 04/11/24 2020    norethindrone (AYGESTIN) tablet 5 mg  5 mg Oral Daily Melida Cedillo MD   5 mg at 04/12/24 0813    pantoprazole (PROTONIX) EC tablet 40 mg  40 mg Oral Daily Melida Cedillo MD   40 mg at 04/12/24 0813    pregabalin (LYRICA) capsule 100 mg  100 mg Oral QAM Melida Cedillo MD   100 mg at 04/12/24 0813    pregabalin (LYRICA) capsule 200 mg  200 mg Oral At Bedtime Brionna Robertson DO   200 mg at 04/11/24 2019    psyllium (METAMUCIL/KONSYL) Packet 1 packet  1 packet Oral Daily Melida Cedillo MD   1 packet at 04/11/24 0800    Semaglutide-Weight Management (WEGOVY) pen SOAJ 1 mg  1 mg Subcutaneous Weekly Brionna Robertson DO   1 mg at 04/07/24 0949    sodium chloride (PF) 0.9% PF flush 10-40 mL  10-40 mL Intracatheter Q7 Days Xena Ramirez MD   10 mL at 04/11/24 1427    vitamin D3 (CHOLECALCIFEROL) tablet 50 mcg  50 mcg Oral Daily Melida Cedillo MD   50 mcg at 04/12/24 0813       Data   Recent Labs   Lab 04/10/24  1157 04/08/24  0604 04/07/24  1823 04/07/24  0638   WBC  --   --   --  8.0   HGB  --   --   --  11.9   MCV  --   --   --  92     --   --  139*   INR  --  1.23*  --   --    NA  --   --   --  143   POTASSIUM  --   --   --  4.1   CHLORIDE  --   --   --  109*   CO2  --   --   --  26   BUN  --   --   --  8.3   CR 0.54  --   --  0.52    ANIONGAP  --   --   --  8   KELBY  --   --   --  8.8   GLC  --   --   --  116*   ALBUMIN  --   --  4.2  --    PROTTOTAL  --   --  5.5*  --    BILITOTAL  --   --  0.4  --    ALKPHOS  --   --  37*  --    ALT  --   --  14  --    AST  --   --  19  --        No results found for this or any previous visit (from the past 24 hour(s)).        Assessment & Plan   Acute bilateral lower extremity paralysis dt CIDP flare, s/p PLEX starting 4/2  CIDP (chronic inflammatory demyelinating polyneuropathy) (H) (4/1/2024)  Chronic bilateral LE neuropathy, sec to above    *Has history of CIDP with peripheral neuropathy.  She underwent treatments with IVIG in the past.  *Initially presented to [facility] ED on 3/30/2024 for evaluation of new onset back pain and bilateral lower extremity weakness (mostly experiences numbness, so weakness was a new symptom for her).  She was unable to ambulate.  *In the ED, labs were generally unremarkable.  General neurology was consulted.  She underwent further evaluation with MRI of the thoracic and lumbar spines.  MRI of the thoracic spine was unremarkable.  MRI lumbar spine showed enlargement of the cauda equina nerve roots and enhancement which was suggestive of CIDP per neurology.  She underwent a lumbar puncture which showed elevated protein and albumin cytologic dissociation which confirmed the diagnosis of CIDP.  She was given 1 g of IV Solu-Medrol and transferred to [facility] to initiate plasma exchange. PICC line was placed.  *Tunneled catheter placed per IR on 4/2 and patient underwent her first round of plasma exchange later that day.  -Patient has been undergoing Plex therapy with plan for a total of 7 treatments every 48 hours.  Currently on treatment 6 on 4/12/2024.  Plex therapy being managed by nephrology.  Will complete treatment on Monday and can be discharged to ARU after.    -- Continue routine neurochecks and fall precautions  -- Continue PTA Lyrica (100mg in AM and  "200mg HS)  -- PT/OT following, recommending TCU vs ARU  -- As needed Dilaudid 2 mg for moderate pain every 4 hours and 3 mg for severe pain.  As needed IV Dilaudid available as well.  -- Neurology has been following, highly appreciate their help  -- MRI of the cervical spine with and without contrast completed last evening 04/06 due to new right upper extremity weakness showing no abnormal signal or enhancement within the cervical cord.  DDD changes at C5-C6 levels were noticed.  Shallow chronic central/right paracentral disc bulge that attenuates the anterior aspect of the subarachnoid spaces and causes subtle contrast of the anterior aspect of the cervical cord as seen in the previous MRI in 2021  -- MRI brain ordered in the setting of ongoing headache showed no recent infarct, mass or hemorrhage.  Scattered nonspecific nonenhancing T2 signal hyperintensity in the supratentorial white matter.  Chronic small vessel ischemic changes.  Unchanged compared to prior MRI in 2023.  .  Did order caffeine 200 mg daily since she said headache started after LP.  This did not help.  Discontinued.  -- Bilateral lower extremity DVT ultrasound done for swelling.  Negative.  -Repeat MRI lumbar spine for her ongoing severe lower back pain on 4/12/2024    Vitamin B12 deficiency  -Vitamin B12 level less than 150.  Methylmalonic acid level pending.  -One-time dose of 1000 mcg IM given on 4/8/2024.  Follow-up with outpatient PCP for further dosing.  -Patient has elevated homocystine level at 17.1 which can be seen with vitamin B12 deficiency.  This should be rechecked in 1 month      Anxiety  Depression  Bipolar disorder  Chronic pain   Hx suspected medication seeking behavior  Frequent medical noncompliance  Frequent utilization of healthcare system  *Complicated psychiatric history.  Patient has a guardian and noted ED treatment plan.  She stated to admitting provider that she may be able to come off her guardianship as \"she is doing " "really well\" and she was planning to move out of her group home and back to an apartment.  *Psychiatry was consulted while she was at [facility], she was seen by DEC counselor while in the ED.  [place] that she was medically cleared to discharge once medical issues had stabilized.    -- Continue on Lyrica as above and Klonopin as needed.  -- On as needed oral and IV Dilaudid.  --Ordered lidocaine patch for lower back pain.  Encouraged use of Flexeril for neck pain.  Also ordered ESGIC for headaches.  Question need for possible blood patch for post LP headache.  To be determined by neurology.  -Started on butalbital with caffeine for headaches which helped with symptom control.  -- She is stable from a mental health standpoint.  Will consider psychiatry consultation if she worsens.     Hx self injurious behavior w/foreign body ingestion  *Hx of 47 endoscopies in last 4 years for foreign body extractions ([place], mickie hair pins, etc) so far during this hospital stay there has no noted self injurious behavior     Chronic abdominal pain  *Noted subacute/chronic issue. Seen at [facility] on 3/29/24 and had normal EGD then left AMA; path report negative for dysplasia, gastritis, or Helicobacter.  *Abx xray 3/31 was nl.   *Chronic and stable on PPI.      Morbid obesity  *Is on Wegovy, reports weight loss of 60 pounds recently. [place] to receive this medication while in the hospital, was given on 3/31.  *Will continue weekly on Sundays while hospitalized, brought in home supply to use.     ZOHRA  *Brought in home CPAP to use on 4/3.     Elevated INR  -INR at 1.23.  Unclear etiology.  LFTs within normal range  -Could have fatty liver in the setting of obesity    Clinically Significant Risk Factors                         # Severe Obesity: Estimated body mass index is 46.09 kg/m  as calculated from the following:    Height as of this encounter: 1.575 m (5' 2\").    Weight as of this encounter: 114.3 kg (252 lb).        # " Financial/Environmental Concerns: none          DVT Prophylaxis: Lovenox 40 SQ twice daily  Code Status: Full Code  Disposition: Expected discharge after completing Plex therapy to acute rehab.  Social work consulted.  Medically Ready for Discharge: Anticipated in 2-4 Days    Greater than 35 minutes spent on examining the patient and discussing care with neurology and discussing care with the patient at bedside    eXna Ramirez MD, MD  [phone](p)

## 2024-04-12 NOTE — PROGRESS NOTES
Treatment in room D40 using Optia apheresis machine. Consent verified.     Height: 5 ft 2 in  Weight: 252 lb  HCT: 36%  Inlet speed: 75  ACDA ratio: 10:1  Fluid balance: 100 %  Access: right CVC     Replacement product: 3000 mL 5% Albumin.  Electrolyte replacement: Ca Gluconate 3 grams in NS - total 80 mls, piggybacked into return lines, infused over time of treatment.    Premedications: none     Treatment Notes: pt tolerated tx well. No complications.     Treatment completed as ordered, VSS, see EPIC flowsheet for run data.     Next treatment: Monday 4/15/2024.    Nguyen St, RN

## 2024-04-13 ENCOUNTER — APPOINTMENT (OUTPATIENT)
Dept: OCCUPATIONAL THERAPY | Facility: CLINIC | Age: 34
DRG: 074 | End: 2024-04-13
Attending: HOSPITALIST
Payer: COMMERCIAL

## 2024-04-13 ENCOUNTER — APPOINTMENT (OUTPATIENT)
Dept: PHYSICAL THERAPY | Facility: CLINIC | Age: 34
DRG: 074 | End: 2024-04-13
Attending: HOSPITALIST
Payer: COMMERCIAL

## 2024-04-13 ENCOUNTER — APPOINTMENT (OUTPATIENT)
Dept: MRI IMAGING | Facility: CLINIC | Age: 34
DRG: 074 | End: 2024-04-13
Attending: PSYCHIATRY & NEUROLOGY
Payer: COMMERCIAL

## 2024-04-13 LAB
CREAT SERPL-MCNC: 0.55 MG/DL (ref 0.51–0.95)
EGFRCR SERPLBLD CKD-EPI 2021: >90 ML/MIN/1.73M2
PLATELET # BLD AUTO: 170 10E3/UL (ref 150–450)

## 2024-04-13 PROCEDURE — 250N000013 HC RX MED GY IP 250 OP 250 PS 637: Performed by: INTERNAL MEDICINE

## 2024-04-13 PROCEDURE — 250N000011 HC RX IP 250 OP 636: Performed by: INTERNAL MEDICINE

## 2024-04-13 PROCEDURE — 99232 SBSQ HOSP IP/OBS MODERATE 35: CPT | Performed by: HOSPITALIST

## 2024-04-13 PROCEDURE — 72158 MRI LUMBAR SPINE W/O & W/DYE: CPT

## 2024-04-13 PROCEDURE — 82565 ASSAY OF CREATININE: CPT | Performed by: INTERNAL MEDICINE

## 2024-04-13 PROCEDURE — 97530 THERAPEUTIC ACTIVITIES: CPT | Mod: GP | Performed by: PHYSICAL THERAPIST

## 2024-04-13 PROCEDURE — 97535 SELF CARE MNGMENT TRAINING: CPT | Mod: GO

## 2024-04-13 PROCEDURE — 120N000001 HC R&B MED SURG/OB

## 2024-04-13 PROCEDURE — 85049 AUTOMATED PLATELET COUNT: CPT | Performed by: INTERNAL MEDICINE

## 2024-04-13 PROCEDURE — 250N000013 HC RX MED GY IP 250 OP 250 PS 637: Performed by: HOSPITALIST

## 2024-04-13 PROCEDURE — 250N000011 HC RX IP 250 OP 636: Performed by: HOSPITALIST

## 2024-04-13 PROCEDURE — 255N000002 HC RX 255 OP 636: Performed by: INTERNAL MEDICINE

## 2024-04-13 PROCEDURE — A9585 GADOBUTROL INJECTION: HCPCS | Performed by: INTERNAL MEDICINE

## 2024-04-13 RX ORDER — DIPHENHYDRAMINE HCL 25 MG
50 CAPSULE ORAL EVERY 6 HOURS PRN
Status: DISCONTINUED | OUTPATIENT
Start: 2024-04-13 | End: 2024-04-16 | Stop reason: HOSPADM

## 2024-04-13 RX ORDER — FUROSEMIDE 20 MG
20 TABLET ORAL DAILY
Status: DISCONTINUED | OUTPATIENT
Start: 2024-04-13 | End: 2024-04-15

## 2024-04-13 RX ORDER — GADOBUTROL 604.72 MG/ML
11 INJECTION INTRAVENOUS ONCE
Status: COMPLETED | OUTPATIENT
Start: 2024-04-13 | End: 2024-04-13

## 2024-04-13 RX ADMIN — PREGABALIN 200 MG: 100 CAPSULE ORAL at 19:42

## 2024-04-13 RX ADMIN — FUROSEMIDE 20 MG: 20 TABLET ORAL at 10:48

## 2024-04-13 RX ADMIN — PSYLLIUM HUSK 1 PACKET: 3.4 POWDER ORAL at 10:47

## 2024-04-13 RX ADMIN — BUTALBITAL, ACETAMINOPHEN AND CAFFEINE 1 TABLET: 50; 325; 40 TABLET ORAL at 19:52

## 2024-04-13 RX ADMIN — HYDROMORPHONE HYDROCHLORIDE 0.3 MG: 1 INJECTION, SOLUTION INTRAMUSCULAR; INTRAVENOUS; SUBCUTANEOUS at 19:52

## 2024-04-13 RX ADMIN — FERROUS SULFATE TAB 325 MG (65 MG ELEMENTAL FE) 325 MG: 325 (65 FE) TAB at 08:02

## 2024-04-13 RX ADMIN — ACETAMINOPHEN 1000 MG: 500 TABLET, FILM COATED ORAL at 15:31

## 2024-04-13 RX ADMIN — CYCLOBENZAPRINE 10 MG: 10 TABLET, FILM COATED ORAL at 23:18

## 2024-04-13 RX ADMIN — PREGABALIN 100 MG: 100 CAPSULE ORAL at 08:02

## 2024-04-13 RX ADMIN — BUTALBITAL, ACETAMINOPHEN AND CAFFEINE 1 TABLET: 50; 325; 40 TABLET ORAL at 10:03

## 2024-04-13 RX ADMIN — LORAZEPAM 0.5 MG: 0.5 TABLET ORAL at 07:57

## 2024-04-13 RX ADMIN — ENOXAPARIN SODIUM 40 MG: 40 INJECTION SUBCUTANEOUS at 08:02

## 2024-04-13 RX ADMIN — CETIRIZINE HYDROCHLORIDE 10 MG: 10 TABLET, FILM COATED ORAL at 19:41

## 2024-04-13 RX ADMIN — MONTELUKAST 10 MG: 10 TABLET, FILM COATED ORAL at 19:42

## 2024-04-13 RX ADMIN — PSYLLIUM HUSK 1 PACKET: 3.4 POWDER ORAL at 08:02

## 2024-04-13 RX ADMIN — ENOXAPARIN SODIUM 40 MG: 40 INJECTION SUBCUTANEOUS at 19:42

## 2024-04-13 RX ADMIN — ACETAMINOPHEN 1000 MG: 500 TABLET, FILM COATED ORAL at 00:32

## 2024-04-13 RX ADMIN — PANTOPRAZOLE SODIUM 40 MG: 40 TABLET, DELAYED RELEASE ORAL at 08:02

## 2024-04-13 RX ADMIN — ONDANSETRON 4 MG: 2 INJECTION INTRAMUSCULAR; INTRAVENOUS at 03:38

## 2024-04-13 RX ADMIN — HYDROMORPHONE HYDROCHLORIDE 3 MG: 2 TABLET ORAL at 06:33

## 2024-04-13 RX ADMIN — NORETHINDRONE ACETATE 5 MG: 5 TABLET ORAL at 08:02

## 2024-04-13 RX ADMIN — Medication 50 MCG: at 08:02

## 2024-04-13 RX ADMIN — GADOBUTROL 11 ML: 604.72 INJECTION INTRAVENOUS at 09:04

## 2024-04-13 RX ADMIN — HYDROMORPHONE HYDROCHLORIDE 3 MG: 2 TABLET ORAL at 13:08

## 2024-04-13 RX ADMIN — HYDROMORPHONE HYDROCHLORIDE 0.3 MG: 1 INJECTION, SOLUTION INTRAMUSCULAR; INTRAVENOUS; SUBCUTANEOUS at 00:33

## 2024-04-13 ASSESSMENT — ACTIVITIES OF DAILY LIVING (ADL)
ADLS_ACUITY_SCORE: 43
ADLS_ACUITY_SCORE: 45
ADLS_ACUITY_SCORE: 44
ADLS_ACUITY_SCORE: 45
ADLS_ACUITY_SCORE: 44
ADLS_ACUITY_SCORE: 45
ADLS_ACUITY_SCORE: 39
ADLS_ACUITY_SCORE: 43
ADLS_ACUITY_SCORE: 44
ADLS_ACUITY_SCORE: 48
ADLS_ACUITY_SCORE: 45
ADLS_ACUITY_SCORE: 44
ADLS_ACUITY_SCORE: 45
ADLS_ACUITY_SCORE: 44
ADLS_ACUITY_SCORE: 39
ADLS_ACUITY_SCORE: 45
ADLS_ACUITY_SCORE: 43
ADLS_ACUITY_SCORE: 45

## 2024-04-13 NOTE — PROVIDER NOTIFICATION
MD Notification    Notified Person: MD    Notified Person Name: Lou Skaggs    Notification Date/Time:04/12/2024, 11:12    Notification Interaction:    Purpose of Notification:  she had a onetime IVP 50 mg Benadryl order, I gave her abt 2000 for itching, it says to notify MD if given. she also has a 25 mg PO Benadryl order for HA, she is asking if that increase to 50 mg PO and can be able to take that instead when experiencing itches.  Orders Received:  MD says will be addressed  Comments:

## 2024-04-13 NOTE — PROGRESS NOTES
[facility]    Hospitalist Progress Note    Interval History   Patient is awake and alert.  Continues to endorse lower back pain.  Complaining of fluid weight gain.  No nausea or vomiting.  Continues to have slow improvement with her neurologic deficits.  Fioricet working for her headaches but she is concerned about her headache becoming chronic.    -Data reviewed today: I reviewed all new labs and imaging results over the last 24 hours. I personally reviewed the mr cervical spine  image(s) showing no abnormal signal . Ddd changes at c5-c6 with loss of disc height  .    Physical Exam   Temp: 97.1  F (36.2  C) Temp src: Oral BP: 128/69 Pulse: 96   Resp: 17 SpO2: 96 % O2 Device: None (Room air)    Vitals:    04/13/24 1000   Weight: 119.5 kg (263 lb 6.4 oz)     Vital Signs with Ranges  Temp:  [97.1  F (36.2  C)-99.2  F (37.3  C)] 97.1  F (36.2  C)  Pulse:  [92-98] 96  Resp:  [16-20] 17  BP: (128-137)/(69-81) 128/69  SpO2:  [92 %-100 %] 96 %  I/O last 3 completed shifts:  In: 538 [P.O.:520; I.V.:18]  Out: 650 [Urine:650]    Physical Exam  Constitutional:       Appearance: Normal appearance. She is obese.   Cardiovascular:      Rate and Rhythm: Normal rate and regular rhythm.      Pulses: Normal pulses.      Heart sounds: Normal heart sounds.   Pulmonary:      Effort: Pulmonary effort is normal. No respiratory distress.      Breath sounds: Normal breath sounds.   Abdominal:      General: Abdomen is flat. Bowel sounds are normal. There is no distension.      Tenderness: There is no abdominal tenderness. There is no guarding.   Skin:     General: Skin is warm and dry.   Neurological:      General: No focal deficit present.      Comments: 2/5 strength in bilateral lower extremities . Needs to use serasteady to move.            Medications   Current Facility-Administered Medications   Medication Dose Route Frequency Provider Last Rate Last Admin     Current Facility-Administered Medications    Medication Dose Route Frequency Provider Last Rate Last Admin    cetirizine (zyrTEC) tablet 10 mg  10 mg Oral At Bedtime rBionna Robertson DO   10 mg at 04/12/24 1944    enoxaparin ANTICOAGULANT (LOVENOX) injection 40 mg  40 mg Subcutaneous Q12H Xena Ramirez MD   40 mg at 04/13/24 0802    ferrous sulfate (FEROSUL) tablet 325 mg  325 mg Oral Daily with breakfast Melida Cedillo MD   325 mg at 04/13/24 0802    furosemide (LASIX) tablet 20 mg  20 mg Oral Daily Xena Ramirez MD   20 mg at 04/13/24 1048    Lidocaine (LIDOCARE) 4 % Patch 1 patch  1 patch Transdermal Q24H Xena Ramirez MD   1 patch at 04/11/24 1243    montelukast (SINGULAIR) tablet 10 mg  10 mg Oral QPM Melida Cedillo MD   10 mg at 04/12/24 1944    norethindrone (AYGESTIN) tablet 5 mg  5 mg Oral Daily Melida Cedillo MD   5 mg at 04/13/24 0802    pantoprazole (PROTONIX) EC tablet 40 mg  40 mg Oral Daily Melida Cedillo MD   40 mg at 04/13/24 0802    pregabalin (LYRICA) capsule 100 mg  100 mg Oral QAM Melida Cedillo MD   100 mg at 04/13/24 0802    pregabalin (LYRICA) capsule 200 mg  200 mg Oral At Bedtime Brionna Robertson DO   200 mg at 04/12/24 1944    psyllium (METAMUCIL/KONSYL) Packet 1 packet  1 packet Oral Daily Melida Cedillo MD   1 packet at 04/13/24 0802    Semaglutide-Weight Management (WEGOVY) pen SOAJ 1 mg  1 mg Subcutaneous Weekly Brionna Robertson DO   1 mg at 04/07/24 0949    sodium chloride (PF) 0.9% PF flush 10-40 mL  10-40 mL Intracatheter Q7 Days Xena Ramirez MD   10 mL at 04/11/24 1427    vitamin D3 (CHOLECALCIFEROL) tablet 50 mcg  50 mcg Oral Daily Nistor, Melida Hailey, MD   50 mcg at 04/13/24 0802       Data   Recent Labs   Lab 04/13/24  0627 04/10/24  1157 04/08/24  0604 04/07/24  1823 04/07/24  0638   WBC  --   --   --   --  8.0   HGB  --   --   --   --  11.9   MCV  --   --   --   --  92    165  --   --  139*   INR  --   --  1.23*  --   --     NA  --   --   --   --  143   POTASSIUM  --   --   --   --  4.1   CHLORIDE  --   --   --   --  109*   CO2  --   --   --   --  26   BUN  --   --   --   --  8.3   CR 0.55 0.54  --   --  0.52   ANIONGAP  --   --   --   --  8   KELBY  --   --   --   --  8.8   GLC  --   --   --   --  116*   ALBUMIN  --   --   --  4.2  --    PROTTOTAL  --   --   --  5.5*  --    BILITOTAL  --   --   --  0.4  --    ALKPHOS  --   --   --  37*  --    ALT  --   --   --  14  --    AST  --   --   --  19  --        Recent Results (from the past 24 hour(s))   MR Lumbar Spine w/o & w Contrast    Narrative    EXAM: MR LUMBAR SPINE W/O and W CONTRAST  LOCATION: [facility]  DATE: 4/13/2024    INDICATION: CIDP, acceleration of back pain after lumbar puncture, new urinary hesitancy  COMPARISON: MRI lumbar spine 03/30/2024  CONTRAST: 11mL Gadavist  TECHNIQUE: Routine Lumbar Spine MRI without and with IV contrast.    FINDINGS:   In keeping with the previously used nomenclature, there is transitional anatomy at the lumbosacral junction with partial sacralization of L5 on the right. Normal vertebral body heights, alignment and marrow signal. Normal distal spinal cord with conus   medullaris at L2-L3. Thin fatty filum. As before, there is abnormal enlargement of multiple bilateral exiting nerve roots, overall similar in extent compared to the prior study. There is linear enhancement of the distal cauda equina beginning at the L4   level, slightly improved compared to the prior study. No extraspinal abnormality. Unremarkable visualized bony pelvis.    T12-L1: Normal disc height and signal. No herniation. Normal facets. No spinal canal or neural foraminal stenosis.     L1-L2: Unchanged mild loss of disc height and T2-weighted signal in the disc. No facet arthropathy or spinal canal stenosis. No neural foraminal stenosis.    L2-L3: Normal disc height. Unchanged mild loss of T2-weighted signal and disc. No facet arthropathy or spinal  canal stenosis. No neural foraminal stenosis.     L3-L4: Unchanged mild loss of disc height and T2-weighted signal in the disc. No facet arthropathy or spinal canal stenosis. No neural foraminal stenosis.    L4-L5: Normal disc height and signal. Unchanged mild bilateral facet arthropathy. No spinal canal stenosis. No neural foraminal stenosis.    L5-S1: Normal disc height and signal. No herniation. Normal facets. No spinal canal or neural foraminal stenosis.      Impression    IMPRESSION:  1.  When compared to 03/30/2024, there is overall unchanged appearance of abnormal enlargement of multiple bilateral exiting nerve roots. There has been slight interval decrease in degree of linear enhancement involving the distal cauda equina beginning   at the L4 level.    2.  No new acute finding.     3.  No significant spinal canal or neural foraminal stenosis.            Assessment & Plan   Acute bilateral lower extremity paralysis dt CIDP flare, s/p PLEX starting 4/2  CIDP (chronic inflammatory demyelinating polyneuropathy) (H) (4/1/2024)  Chronic bilateral LE neuropathy, sec to above    *Has history of CIDP with peripheral neuropathy.  She underwent treatments with IVIG in the past.  *Initially presented to [facility] ED on 3/30/2024 for evaluation of new onset back pain and bilateral lower extremity weakness (mostly experiences numbness, so weakness was a new symptom for her).  She was unable to ambulate.  *In the ED, labs were generally unremarkable.  General neurology was consulted.  She underwent further evaluation with MRI of the thoracic and lumbar spines.  MRI of the thoracic spine was unremarkable.  MRI lumbar spine showed enlargement of the cauda equina nerve roots and enhancement which was suggestive of CIDP per neurology.  She underwent a lumbar puncture which showed elevated protein and albumin cytologic dissociation which confirmed the diagnosis of CIDP.  She was given 1 g of IV Solu-Medrol and transferred to  [facility] to initiate plasma exchange. PICC line was placed.  *Tunneled catheter placed per IR on 4/2 and patient underwent her first round of plasma exchange later that day.  -Patient has been undergoing Plex therapy with plan for a total of 7 treatments every 48 hours.  Underwent treatment 6 on 4/12/2024.  Treatment 7 on 4/14/2024..  Plex therapy being managed by nephrology.  Will complete treatment on Monday.  Initial plan was to discharge to ARU but till she has been rejected by multiple ARU since she is doing too well.    -- Continue routine neurochecks and fall precautions  -- Continue PTA Lyrica (100mg in AM and 200mg HS)  -- PT/OT following, recommending TCU vs ARU  -- As needed Dilaudid 2 mg for moderate pain every 4 hours and 3 mg for severe pain.  As needed IV Dilaudid available as well.  -- Neurology has been following, highly appreciate their help  -- MRI of the cervical spine with and without contrast completed last evening 04/06 due to new right upper extremity weakness showing no abnormal signal or enhancement within the cervical cord.  DDD changes at C5-C6 levels were noticed.  Shallow chronic central/right paracentral disc bulge that attenuates the anterior aspect of the subarachnoid spaces and causes subtle contrast of the anterior aspect of the cervical cord as seen in the previous MRI in 2021  -- MRI brain ordered in the setting of ongoing headache showed no recent infarct, mass or hemorrhage.  Scattered nonspecific nonenhancing T2 signal hyperintensity in the supratentorial white matter.  Chronic small vessel ischemic changes.  Unchanged compared to prior MRI in 2023.  .  Did order caffeine 200 mg daily since she said headache started after LP.  This did not help.  Discontinued.  -- Bilateral lower extremity DVT ultrasound done for swelling.  Negative.  -Repeat MRI lumbar spine on 4/13/2024 for persistent low back pain showed no new findings.  Continues to have nerve root  "enhancement.  -Continue with the current pain regimen.    Vitamin B12 deficiency  -Vitamin B12 level less than 150.  Methylmalonic acid level pending.  -One-time dose of 1000 mcg IM given on 4/8/2024.  Follow-up with outpatient PCP for further dosing.  -Patient has elevated homocystine level at 17.1 which can be seen with vitamin B12 deficiency.  This should be rechecked in 1 month      Anxiety  Depression  Bipolar disorder  Chronic pain   Hx suspected medication seeking behavior  Frequent medical noncompliance  Frequent utilization of healthcare system  *Complicated psychiatric history.  Patient has a guardian and noted ED treatment plan.  She stated to admitting provider that she may be able to come off her guardianship as \"she is doing really well\" and she was planning to move out of her group home and back to an apartment.  *Psychiatry was consulted while she was at [facility], she was seen by DEC counselor while in the ED.  [place] that she was medically cleared to discharge once medical issues had stabilized.    -- Continue on Lyrica as above and Klonopin as needed.  -- On as needed oral and IV Dilaudid.  --Ordered lidocaine patch for lower back pain.  Encouraged use of Flexeril for neck pain.  Also ordered ESGIC for headaches.  Question need for possible blood patch for post LP headache.  To be determined by neurology.  -Started on butalbital with caffeine for headaches which helped with symptom control.  -- She is stable from a mental health standpoint.  Will consider psychiatry consultation if she worsens.     Hx self injurious behavior w/foreign body ingestion  *Hx of 47 endoscopies in last 4 years for foreign body extractions ([place], mickie hair pins, etc) so far during this hospital stay there has no noted self injurious behavior     Chronic abdominal pain  *Noted subacute/chronic issue. Seen at [facility] on 3/29/24 and had normal EGD then left AMA; path report negative for dysplasia, gastritis, or " "Helicobacter.  *Abx xray 3/31 was nl.   *Chronic and stable on PPI.      Morbid obesity  *Is on Wegovy, reports weight loss of 60 pounds recently. [place] to receive this medication while in the hospital, was given on 3/31.  *Will continue weekly on Sundays while hospitalized, brought in home supply to use.     ZOHRA  *Brought in home CPAP to use on 4/3.     Elevated INR  -INR at 1.23.  Unclear etiology.  LFTs within normal range  -Could have fatty liver in the setting of obesity    Clinically Significant Risk Factors                         # Severe Obesity: Estimated body mass index is 48.18 kg/m  as calculated from the following:    Height as of this encounter: 1.575 m (5' 2\").    Weight as of this encounter: 119.5 kg (263 lb 6.4 oz).        # Financial/Environmental Concerns: none          DVT Prophylaxis: Lovenox 40 SQ twice daily  Code Status: Full Code  Disposition: Expected discharge after completing Plex therapy.  Initial plan was to discharge to ARU.  However multiple ARU's have rejected the patient stay since she is doing too well for ARU level care.  If she continues to do well with therapy then she could potentially go home back to her group home with home therapies.  Reevaluate on Monday.      Medically Ready for Discharge: Anticipated in 2-4 Days    Greater than 35 minutes spent on documentation, examining the patient and review of care with bedside nurse.    Xena Ramirez MD, MD  [phone](p)   "

## 2024-04-13 NOTE — PLAN OF CARE
Goal Outcome Evaluation:    Pt here with CIDP flare up, receiving PLEX treatments. A&Ox4. Neuros BLE weakness 3/5, baseline numbness in bilateral feet/toes. VSS on RA. Regular diet, thin liquids. Takes pills whole. Up with Ax1 GBW. Complains of HA/back pain, PRN oral dilauded given x1, esgic given x1 & tylenol given x1. Pt scoring green on the Aggression Stop Light Tool. Plan to finish PLEX treatment 7 of 7 on monday. Lumbar MRI completed, ativan given PRN x1 for MRI. Therapies recommending discharge back to group home.

## 2024-04-13 NOTE — PLAN OF CARE
Goal Outcome Evaluation:  Pt here with chronic inflammatory flare up demyelinating polyneuropathy.   A&O times four. Neuros baseline numbness lower ext and weakness.    VSS. Regular diet, Takes pills whole. Up with one, GB and walker.   Reports back pain and HA, PRN meds had been effective.  Pt scoring yellow on the Aggression Stop Light Tool.   Plan apheresis on Monday per Nephrology.   Discharge pending.   1-1 sit at the bedside. Continue to monitor.

## 2024-04-13 NOTE — PLAN OF CARE
Pt here with CIDP flare up, receiving PLEX treatments. A&O x4. Neuros BLE weakness 3/5, baseline numbness in bilateral feet/toes. VSS on RA. Regular diet, thin liquids. Takes pills whole. Up with Ax1 SS or GB/W. C/O HA/back pain, PRN dilauded given x1, tylenol given x1. Pt scoring green on the Aggression Stop Light Tool. Plan to continue PLEX treatment 7 of 7 on monday. Lumbar MRI to be completed, pt to receive PO ativan 30 mins prior. Discharge plan pending either back to group home vs TCU.

## 2024-04-13 NOTE — PROGRESS NOTES
"   04/13/24 1500   Appointment Info   Signing Clinician's Name / Credentials (PT) Anjelica Dos Santos, PT, DPT   Therapeutic Activity   Therapeutic Activities: dynamic activities to improve functional performance Minutes (41758) 44   Symptoms Noted During/After Treatment Fatigue;Increased pain   Treatment Detail/Skilled Intervention Greeted in bed. Completed bed mobiltiy w/SBA. Completed sit>stand from bed and ambulation in hallway 200ft with 2WW and SBA, chair follow. Completed stair negotation w/B rails and SBA. Has now progressed to step over step on ascent and descent. Still heavily uses B UEs. Reports she does this at home as well. Completed ambulation back to room w/no AD and SBA - incr NATY and arms out, but by end of walk pt more relaxed, endorsing \"this felt good.\" Ambulated into restroom and sat w/SBA. No assistance was needed throughout entire session. Reviewed d/c rec for going back to group home, appeared hesitant at first but then excited, in agreement.   PT Discharge Planning   PT Plan Mobility as able to tolerate; Per OT, pt requesting to review stairs no rail (??), getting up/down from tall bed using a stool.   PT Discharge Recommendation (DC Rec) home with assist;home with home care physical therapy   PT Rationale for DC Rec Pt has been making excellent progress - is now completing transfers, amb, and stairs negotiation w/SBA No physical assist, no overt loss of balance or knee buckling. Pt is markedly below age-normative values for strength, mobility, gait speed - she would benefit from HHPT to maximize fn outcomes. At this time pt appears to have made grea improvements in strength , endurance and fn mobility such that she can d/c back to prior living situation.   PT Brief overview of current status SBA transfers, amb, stairs w/no AD in therapies  - nursing please continue to use 2WW at this time.   PT Equipment Needed at Discharge   (Already owns 2WW)   Total Session Time   Timed Code Treatment " Minutes 44   Total Session Time (sum of timed and untimed services) 44

## 2024-04-13 NOTE — PLAN OF CARE
Goal Outcome Evaluation:    Pt here with CIDP flare up. A&O x4. Neuros BLE weakness 3/5, baseline numbness in bilateral feet/toes. VSS on RA. Regular diet, thin liquids. Takes pills whole. Up with Ax1 GBW. Complains of HA/back pain, PRN oral dilauded given x1, esgic given x1 & tylenol given x1. Pt scoring green on the Aggression Stop Light Tool. Plan to continue PLEX treatment 7 of 7 on monday. Lumbar MRI completed, ativan given PRN x1 for MRI. Therapies recommending discharge back to group home.

## 2024-04-13 NOTE — PROGRESS NOTES
Neurology Progress Note      Subjective    Patient up in chair, working on lap top, appears much more comfortable.      SA and  5/5 b/l    HF 4+/5 bilaterally  DF 2/5 on left and 4-/5 on right           Diagnostic Work-up Review:  Vit B12 <150     MRI brain without and with Contrast (4/8/2024)  IMPRESSION:  1.  No recent infarct, intracranial mass, pathologic enhancement or evidence of intracranial hemorrhage.  2.  Scattered nonspecific foci of nonenhancing T2 signal hyperintensity in the supratentorial white matter in a random distribution. Differential considerations include foci of nonspecific gliosis or chronic myelin loss, sequelae of chronic headaches and   age-advanced chronic small vessel ischemic changes. The appearance is unchanged compared with the brain MRI of 10/12/2023.     MRI Cervical Spine without and with Contrast (4/6/2024)  IMPRESSION:  1.  No abnormal signal or enhancement within the cervical cord.     2.  DDD change at C5-C6 with loss of disc height. Shallow chronic central/right paracentral disc bulge at C5-C6 results in subtle attenuation of the anterior/right aspect of the subarachnoid space and causes subtle contact of the anterior/right aspect of   the cervical cord as seen on previous MRI scan performed 02/24/2021.        MRI Lumbar Spine without and with  Contrast (3/30/2024)  IMPRESSION:  1.  Cauda equina nerve roots appear mildly enlarged as well as the lower lumbar spine and sacral exiting nerve roots. Some of these nerve roots demonstrate thin nonnodular enhancement. Findings may reflect chronic inflammatory demyelinating   polyneuropathy, Charcot-Monique-Tooth, neurofibromatosis type I. Enhancement could suggest acute infectious inflammatory process superimposed on chronic findings.     2.  Transitional lumbosacral anatomy described above.     3.  No significant degenerative changes.     4.  No significant thecal sac stenosis. No significant neural foraminal stenosis.    Repeat  MRI Lumbar Spine without and with Contrast (4/13/2024)  IMPRESSION:  1.  When compared to 03/30/2024, there is overall unchanged appearance of abnormal enlargement of multiple bilateral exiting nerve roots. There has been slight interval decrease in degree of linear enhancement involving the distal cauda equina beginning   at the L4 level.     2.  No new acute finding.      3.  No significant spinal canal or neural foraminal stenosis.         CSF   RBC - 0  WBC - 1  Protein - 104.9        Impression:  33 year old female presented from assisted with generalized weakness and low back pain 3/30/2024.  She has a history of CIDP though had not responded previously to IVIG.  MRI lumbar spine was without any revealing results though did show changes that are consistent with CIDP.  She was started on plasma exchange (she is s/p 5 cycles, 6th planned for today).  It is noted that there are functional features to her presentation that complicate the assessment.  An MRI c--spine was ordered because of the concern for new arm weakness, this was unremarkable.     Her strength seems to be improving and so we are opting for 7 cycles of PLEX.  Again, assessment is somewhat complicated by some functional signs but subjectively and objectively there seems to be some very slow improvement (though in the last couple days, I think the improvement has been greatly accelerating in recent days).      With yesterday's complaint of back pain, though present upon admission, given seemed to be worse after LP (though the association is not entirely clear) and possibly some urinary hesitancy, ordered repeat MRI of the L-spine, without any new findings (maybe started to see some slight improvement in the [presumed CIDP related] nerve root enhancement).  She appears much more comfortable today.  Fioricet seems to work well for her headaches, discussed that this would not be a good long-term solution because it can worsen headaches over time.   But this should not be a problem for use of only a week or two in the hospital.      Recommendations:  - continue PLEX, completed 6 / 7 cycles, will need follow up with primary neurologist   - IM B12 administered for significant deficiency, will need follow up with internal medicine for further treatment     I spent 27 minutes on the date of encounter with the patient and before and after the visit on activities detailed in the above note which may include reviewing the EMR, documenting clinical information, and communicating with other health care professionals.    Zurdo Giang MD  [facility] [phone]

## 2024-04-14 ENCOUNTER — APPOINTMENT (OUTPATIENT)
Dept: PHYSICAL THERAPY | Facility: CLINIC | Age: 34
DRG: 074 | End: 2024-04-14
Attending: HOSPITALIST
Payer: COMMERCIAL

## 2024-04-14 ENCOUNTER — APPOINTMENT (OUTPATIENT)
Dept: OCCUPATIONAL THERAPY | Facility: CLINIC | Age: 34
DRG: 074 | End: 2024-04-14
Attending: HOSPITALIST
Payer: COMMERCIAL

## 2024-04-14 PROCEDURE — 97530 THERAPEUTIC ACTIVITIES: CPT | Mod: GP | Performed by: PHYSICAL THERAPIST

## 2024-04-14 PROCEDURE — 250N000013 HC RX MED GY IP 250 OP 250 PS 637: Performed by: INTERNAL MEDICINE

## 2024-04-14 PROCEDURE — 250N000013 HC RX MED GY IP 250 OP 250 PS 637: Performed by: HOSPITALIST

## 2024-04-14 PROCEDURE — 250N000011 HC RX IP 250 OP 636: Performed by: INTERNAL MEDICINE

## 2024-04-14 PROCEDURE — 99232 SBSQ HOSP IP/OBS MODERATE 35: CPT | Performed by: HOSPITALIST

## 2024-04-14 PROCEDURE — 120N000001 HC R&B MED SURG/OB

## 2024-04-14 PROCEDURE — 250N000011 HC RX IP 250 OP 636: Performed by: HOSPITALIST

## 2024-04-14 PROCEDURE — 97535 SELF CARE MNGMENT TRAINING: CPT | Mod: GO

## 2024-04-14 RX ADMIN — PREGABALIN 100 MG: 100 CAPSULE ORAL at 09:21

## 2024-04-14 RX ADMIN — HYDROMORPHONE HYDROCHLORIDE 0.3 MG: 1 INJECTION, SOLUTION INTRAMUSCULAR; INTRAVENOUS; SUBCUTANEOUS at 23:24

## 2024-04-14 RX ADMIN — CYCLOBENZAPRINE 10 MG: 10 TABLET, FILM COATED ORAL at 13:37

## 2024-04-14 RX ADMIN — PSYLLIUM HUSK 1 PACKET: 3.4 POWDER ORAL at 09:20

## 2024-04-14 RX ADMIN — PANTOPRAZOLE SODIUM 40 MG: 40 TABLET, DELAYED RELEASE ORAL at 09:21

## 2024-04-14 RX ADMIN — HYDROMORPHONE HYDROCHLORIDE 3 MG: 2 TABLET ORAL at 19:53

## 2024-04-14 RX ADMIN — FERROUS SULFATE TAB 325 MG (65 MG ELEMENTAL FE) 325 MG: 325 (65 FE) TAB at 09:21

## 2024-04-14 RX ADMIN — PREGABALIN 200 MG: 100 CAPSULE ORAL at 19:53

## 2024-04-14 RX ADMIN — MONTELUKAST 10 MG: 10 TABLET, FILM COATED ORAL at 19:53

## 2024-04-14 RX ADMIN — BUTALBITAL, ACETAMINOPHEN AND CAFFEINE 1 TABLET: 50; 325; 40 TABLET ORAL at 09:37

## 2024-04-14 RX ADMIN — FUROSEMIDE 20 MG: 20 TABLET ORAL at 09:21

## 2024-04-14 RX ADMIN — ACETAMINOPHEN 1000 MG: 500 TABLET, FILM COATED ORAL at 12:54

## 2024-04-14 RX ADMIN — Medication 50 MCG: at 09:21

## 2024-04-14 RX ADMIN — BUTALBITAL, ACETAMINOPHEN AND CAFFEINE 1 TABLET: 50; 325; 40 TABLET ORAL at 17:36

## 2024-04-14 RX ADMIN — ENOXAPARIN SODIUM 40 MG: 40 INJECTION SUBCUTANEOUS at 09:20

## 2024-04-14 RX ADMIN — BUTALBITAL, ACETAMINOPHEN AND CAFFEINE 1 TABLET: 50; 325; 40 TABLET ORAL at 22:15

## 2024-04-14 RX ADMIN — HYDROMORPHONE HYDROCHLORIDE 2 MG: 2 TABLET ORAL at 03:57

## 2024-04-14 RX ADMIN — HYDROMORPHONE HYDROCHLORIDE 3 MG: 2 TABLET ORAL at 15:17

## 2024-04-14 RX ADMIN — NORETHINDRONE ACETATE 5 MG: 5 TABLET ORAL at 09:21

## 2024-04-14 RX ADMIN — ONDANSETRON 4 MG: 4 TABLET, ORALLY DISINTEGRATING ORAL at 19:54

## 2024-04-14 RX ADMIN — CETIRIZINE HYDROCHLORIDE 10 MG: 10 TABLET, FILM COATED ORAL at 19:53

## 2024-04-14 RX ADMIN — ENOXAPARIN SODIUM 40 MG: 40 INJECTION SUBCUTANEOUS at 19:54

## 2024-04-14 RX ADMIN — HYDROMORPHONE HYDROCHLORIDE 3 MG: 2 TABLET ORAL at 09:37

## 2024-04-14 ASSESSMENT — ACTIVITIES OF DAILY LIVING (ADL)
ADLS_ACUITY_SCORE: 39
ADLS_ACUITY_SCORE: 40
ADLS_ACUITY_SCORE: 39
ADLS_ACUITY_SCORE: 40
ADLS_ACUITY_SCORE: 39
ADLS_ACUITY_SCORE: 40
ADLS_ACUITY_SCORE: 39
ADLS_ACUITY_SCORE: 40
ADLS_ACUITY_SCORE: 39
ADLS_ACUITY_SCORE: 40
ADLS_ACUITY_SCORE: 39
ADLS_ACUITY_SCORE: 40
ADLS_ACUITY_SCORE: 39

## 2024-04-14 NOTE — PROGRESS NOTES
[facility]    Hospitalist Progress Note    Interval History   Patient is awake and alert.  Undergoing therapy this morning.  No new complaints.  Has multiple questions about possibly continuing Plex therapy as outpatient.    -Data reviewed today: I reviewed all new labs and imaging results over the last 24 hours. I personally reviewed the mr cervical spine  image(s) showing no abnormal signal . Ddd changes at c5-c6 with loss of disc height  .    Physical Exam   Temp: 99.4  F (37.4  C) Temp src: Oral BP: 135/84 Pulse: 99   Resp: 20 SpO2: 96 % O2 Device: None (Room air)    Vitals:    04/13/24 1000   Weight: 119.5 kg (263 lb 6.4 oz)     Vital Signs with Ranges  Temp:  [98.7  F (37.1  C)-99.7  F (37.6  C)] 99.4  F (37.4  C)  Pulse:  [] 99  Resp:  [16-20] 20  BP: (135-155)/(75-84) 135/84  SpO2:  [94 %-96 %] 96 %  I/O last 3 completed shifts:  In: 960 [P.O.:960]  Out: 2610 [Urine:2610]    Physical Exam  Constitutional:       Appearance: Normal appearance. She is obese.   Cardiovascular:      Rate and Rhythm: Normal rate and regular rhythm.      Pulses: Normal pulses.      Heart sounds: Normal heart sounds.   Pulmonary:      Effort: Pulmonary effort is normal. No respiratory distress.      Breath sounds: Normal breath sounds.   Abdominal:      General: Abdomen is flat. Bowel sounds are normal. There is no distension.      Tenderness: There is no abdominal tenderness. There is no guarding.   Skin:     General: Skin is warm and dry.   Neurological:      General: No focal deficit present.      Comments: 2/5 strength in bilateral lower extremities . Needs to use serasteady to move.            Medications   Current Facility-Administered Medications   Medication Dose Route Frequency Provider Last Rate Last Admin     Current Facility-Administered Medications   Medication Dose Route Frequency Provider Last Rate Last Admin    cetirizine (zyrTEC) tablet 10 mg  10 mg Oral At Bedtime Brionna Robertson  DO Tea   10 mg at 04/13/24 1941    enoxaparin ANTICOAGULANT (LOVENOX) injection 40 mg  40 mg Subcutaneous Q12H Xena Ramirez MD   40 mg at 04/14/24 0920    ferrous sulfate (FEROSUL) tablet 325 mg  325 mg Oral Daily with breakfast Melida Cedillo MD   325 mg at 04/14/24 0921    furosemide (LASIX) tablet 20 mg  20 mg Oral Daily Xena Ramirez MD   20 mg at 04/14/24 0921    Lidocaine (LIDOCARE) 4 % Patch 1 patch  1 patch Transdermal Q24H Xena Ramirez MD   1 patch at 04/11/24 1243    montelukast (SINGULAIR) tablet 10 mg  10 mg Oral QPM Melida Cedillo MD   10 mg at 04/13/24 1942    norethindrone (AYGESTIN) tablet 5 mg  5 mg Oral Daily Melida Cedillo MD   5 mg at 04/14/24 0921    pantoprazole (PROTONIX) EC tablet 40 mg  40 mg Oral Daily Melida Cedillo MD   40 mg at 04/14/24 0921    pregabalin (LYRICA) capsule 100 mg  100 mg Oral QAM Melida Cedillo MD   100 mg at 04/14/24 0921    pregabalin (LYRICA) capsule 200 mg  200 mg Oral At Bedtime Brionna Robertson DO   200 mg at 04/13/24 1942    psyllium (METAMUCIL/KONSYL) Packet 1 packet  1 packet Oral Daily Melida Cedillo MD   1 packet at 04/14/24 0920    Semaglutide-Weight Management (WEGOVY) pen SOAJ 1 mg  1 mg Subcutaneous Weekly Brionna Robertson DO   1 mg at 04/14/24 0920    sodium chloride (PF) 0.9% PF flush 10-40 mL  10-40 mL Intracatheter Q7 Days Xena Ramirez MD   10 mL at 04/11/24 1427    vitamin D3 (CHOLECALCIFEROL) tablet 50 mcg  50 mcg Oral Daily Melida Cedillo MD   50 mcg at 04/14/24 0921       Data   Recent Labs   Lab 04/13/24  0627 04/10/24  1157 04/08/24  0604 04/07/24  1823    165  --   --    INR  --   --  1.23*  --    CR 0.55 0.54  --   --    ALBUMIN  --   --   --  4.2   PROTTOTAL  --   --   --  5.5*   BILITOTAL  --   --   --  0.4   ALKPHOS  --   --   --  37*   ALT  --   --   --  14   AST  --   --   --  19       No results found for this or any previous visit (from  the past 24 hour(s)).          Assessment & Plan   Acute bilateral lower extremity paralysis dt CIDP flare, s/p PLEX starting 4/2  CIDP (chronic inflammatory demyelinating polyneuropathy) (H) (4/1/2024)  Chronic bilateral LE neuropathy, sec to above    *Has history of CIDP with peripheral neuropathy.  She underwent treatments with IVIG in the past.  *Initially presented to [facility] ED on 3/30/2024 for evaluation of new onset back pain and bilateral lower extremity weakness (mostly experiences numbness, so weakness was a new symptom for her).  She was unable to ambulate.  *In the ED, labs were generally unremarkable.  General neurology was consulted.  She underwent further evaluation with MRI of the thoracic and lumbar spines.  MRI of the thoracic spine was unremarkable.  MRI lumbar spine showed enlargement of the cauda equina nerve roots and enhancement which was suggestive of CIDP per neurology.  She underwent a lumbar puncture which showed elevated protein and albumin cytologic dissociation which confirmed the diagnosis of CIDP.  She was given 1 g of IV Solu-Medrol and transferred to [facility] to initiate plasma exchange. PICC line was placed.  *Tunneled catheter placed per IR on 4/2 and patient underwent her first round of plasma exchange later that day.  -Patient has been undergoing Plex therapy with plan for a total of 7 treatments every 48 hours.  Underwent treatment 6 on 4/12/2024.  Treatment 7 on 4/15/2024..  Plex therapy being managed by nephrology.  Will complete treatment on Monday.  Patient can potentially be discharged home with home therapy after treatment.    -- Continue routine neurochecks and fall precautions  -- Continue PTA Lyrica (100mg in AM and 200mg HS)  -- PT/OT following, cleared for discharge back to her group home with home therapy at this point  -- As needed Dilaudid 2 mg for moderate pain every 4 hours and 3 mg for severe pain.  As needed IV Dilaudid available as  well.  -- Neurology has been following, highly appreciate their help  -- MRI of the cervical spine with and without contrast completed last evening 04/06 due to new right upper extremity weakness showing no abnormal signal or enhancement within the cervical cord.  DDD changes at C5-C6 levels were noticed.  Shallow chronic central/right paracentral disc bulge that attenuates the anterior aspect of the subarachnoid spaces and causes subtle contrast of the anterior aspect of the cervical cord as seen in the previous MRI in 2021  -- MRI brain ordered in the setting of ongoing headache showed no recent infarct, mass or hemorrhage.  Scattered nonspecific nonenhancing T2 signal hyperintensity in the supratentorial white matter.  Chronic small vessel ischemic changes.  Unchanged compared to prior MRI in 2023.  .  Did order caffeine 200 mg daily since she said headache started after LP.  This did not help.  Discontinued.  -- Bilateral lower extremity DVT ultrasound done for swelling.  Negative.  -Repeat MRI lumbar spine on 4/13/2024 for persistent low back pain showed no new findings.  Continues to have nerve root enhancement.  -Continue with the current pain regimen.    Patient had multiple questions about continuing Plex therapy as outpatient.  Currently based neurology chart review there appears to be no plan to continue ongoing outpatient Plex therapy but I advised her to discuss with neurology more extensively about this.  If neurology clears her for discharge tomorrow then her midline and central line will have to be removed prior to discharge.  She can go back to her group home with home therapy.    Vitamin B12 deficiency  -Vitamin B12 level less than 150.  Methylmalonic acid level pending.  -One-time dose of 1000 mcg IM given on 4/8/2024.  Follow-up with outpatient PCP for further dosing.  -Patient has elevated homocystine level at 17.1 which can be seen with vitamin B12 deficiency.  This should be rechecked in 1  "month    Anxiety  Depression  Bipolar disorder  Chronic pain   Hx suspected medication seeking behavior  Frequent medical noncompliance  Frequent utilization of healthcare system  *Complicated psychiatric history.  Patient has a guardian and noted ED treatment plan.  She stated to admitting provider that she may be able to come off her guardianship as \"she is doing really well\" and she was planning to move out of her group home and back to an apartment.  *Psychiatry was consulted while she was at [facility], she was seen by DEC counselor while in the ED.  [place] that she was medically cleared to discharge once medical issues had stabilized.    -- Continue on Lyrica as above and Klonopin as needed.  -- On as needed oral and IV Dilaudid.  --Ordered lidocaine patch for lower back pain.  Encouraged use of Flexeril for neck pain.    -Started on butalbital with caffeine for headaches which helped with symptom control.  -- She is stable from a mental health standpoint.   -Patient requested psychiatry consult to help with anxiety.  Consult placed.     Hx self injurious behavior w/foreign body ingestion  *Hx of 47 endoscopies in last 4 years for foreign body extractions ([place], mickie hair pins, etc) so far during this hospital stay there has no noted self injurious behavior     Chronic abdominal pain  *Noted subacute/chronic issue. Seen at [facility] on 3/29/24 and had normal EGD then left AMA; path report negative for dysplasia, gastritis, or Helicobacter.  *Abx xray 3/31 was nl.   *Chronic and stable on PPI.      Morbid obesity  *Is on Wegovy, reports weight loss of 60 pounds recently. [place] to receive this medication while in the hospital, was given on 3/31.  *Will continue weekly on Sundays while hospitalized, brought in home supply to use.     ZOHRA  *Brought in home CPAP to use on 4/3.     Elevated INR  -INR at 1.23.  Unclear etiology.  LFTs within normal range  -Could have fatty liver in the setting of " "obesity    Clinically Significant Risk Factors                         # Severe Obesity: Estimated body mass index is 48.18 kg/m  as calculated from the following:    Height as of this encounter: 1.575 m (5' 2\").    Weight as of this encounter: 119.5 kg (263 lb 6.4 oz).        # Financial/Environmental Concerns: none          DVT Prophylaxis: Lovenox 40 SQ twice daily  Code Status: Full Code  Disposition: Expected discharge after completing Plex therapy.  Initial plan was to discharge to ARU.  However multiple ARU's have rejected the patient stay since she is doing too well for ARU level care.  If she continues to do well with therapy then she could potentially go home back to her group home with home therapies.  Reevaluate on Monday.      Medically Ready for Discharge: Anticipated in 2-4 Days    Greater than 35 minutes spent on documentation review, lab review and discussing care with patient at bedside and examination    Xena Ramirez MD, MD  [phone](p)   "

## 2024-04-14 NOTE — PROGRESS NOTES
"Followed up with patient about conversation with neurology. Patient stated neurology did not want to give her a nephrology consult, and that should be done with her outpatient neurologist. Patient said \"I don't feel comfortable discharging with no plan. I need a plan to discharge. My outpatient neurologist said couldn't help me, which is why I left my last provider because he said the same thing.\" She than listed multiple reasons why she is not comfortable discharging tomorrow including, \"not having access to dilaudid\" \"not having a plan for my headaches and back pain\" \"needing a ride set up to get home\" \"getting my last round of PLEX tomorrow\" and she's \"still working with PT and OT\"    Writer addressed patient concerns and educated patient about safe discharge planning, and reassured patient she would not be discharged if her team was not confident she would be safe at home. Patient continuously stated the above and repeated that she did not feel ready. Reinforcement of education is needed.   "

## 2024-04-14 NOTE — PLAN OF CARE
Goal Outcome Evaluation:    Pt here with CIDP flare up. A&Ox4. Neuros RLE 4/5, LLE 3/5, weak ankle strength on L and  moderate on R. VSS on RA. Regular diet, thin liquids. Takes pills whole. Up with Ax1 GB, intermittently refusing the gait belt. Complains of HA/back pain, PRN oral dilaudid 3mg given x2, esgic given x2 & tylenol given x1, flexeril given x1. Pt scoring green on the Aggression Stop Light Tool. Plan to continue PLEX treatment 7 of 7 on Monday. Therapies recommending discharge back to group home.

## 2024-04-14 NOTE — PROGRESS NOTES
Neurology Progress Note      Subjective    Patient is just waking up, but she seems to continue to improve.  Yesterday she was up walking with a walker, she says PT encouraged her to try without a walker and she says she was able to do this.         SA and  5/5 bilaterally    HF 5-/5 on right and 4-/5 on left, DF remains limited on left but 4/5 on right      Diagnostic Work-up Review:    Vit B12 <150     MRI brain without and with Contrast (4/8/2024)  IMPRESSION:  1.  No recent infarct, intracranial mass, pathologic enhancement or evidence of intracranial hemorrhage.  2.  Scattered nonspecific foci of nonenhancing T2 signal hyperintensity in the supratentorial white matter in a random distribution. Differential considerations include foci of nonspecific gliosis or chronic myelin loss, sequelae of chronic headaches and   age-advanced chronic small vessel ischemic changes. The appearance is unchanged compared with the brain MRI of 10/12/2023.     MRI Cervical Spine without and with Contrast (4/6/2024)  IMPRESSION:  1.  No abnormal signal or enhancement within the cervical cord.     2.  DDD change at C5-C6 with loss of disc height. Shallow chronic central/right paracentral disc bulge at C5-C6 results in subtle attenuation of the anterior/right aspect of the subarachnoid space and causes subtle contact of the anterior/right aspect of   the cervical cord as seen on previous MRI scan performed 02/24/2021.        MRI Lumbar Spine without and with  Contrast (3/30/2024)  IMPRESSION:  1.  Cauda equina nerve roots appear mildly enlarged as well as the lower lumbar spine and sacral exiting nerve roots. Some of these nerve roots demonstrate thin nonnodular enhancement. Findings may reflect chronic inflammatory demyelinating   polyneuropathy, Charcot-Monique-Tooth, neurofibromatosis type I. Enhancement could suggest acute infectious inflammatory process superimposed on chronic findings.     2.  Transitional lumbosacral anatomy  described above.     3.  No significant degenerative changes.     4.  No significant thecal sac stenosis. No significant neural foraminal stenosis.     Repeat MRI Lumbar Spine without and with Contrast (4/13/2024)  IMPRESSION:  1.  When compared to 03/30/2024, there is overall unchanged appearance of abnormal enlargement of multiple bilateral exiting nerve roots. There has been slight interval decrease in degree of linear enhancement involving the distal cauda equina beginning   at the L4 level.     2.  No new acute finding.      3.  No significant spinal canal or neural foraminal stenosis.         CSF   RBC - 0  WBC - 1  Protein - 104.9        Impression:  33 year old female presented from [facility] with generalized weakness and low back pain 3/30/2024. She has a history of CIDP though had not responded previously to IVIG. MRI lumbar spine was without any revealing results though did show changes that are consistent with CIDP. She was started on plasma exchange (she is s/p 6 cycles). It is noted that there are functional features to her presentation that complicate the assessment. An MRI c--spine was ordered because of the concern for new arm weakness, this was unremarkable. An MRI L-spine was repeated as well when back pain seemed worse after LP (also reporting some urinary hesitancy), this was unremarkable as well.    Her strength has been improving and so we are opting for 7 cycles of PLEX.  Again, assessment is somewhat complicated by some functional signs but subjectively and objectively there seems to be some significant improvement.      Recommendations:  - continue PLEX, completed 6 / 7 cycles, last cycle tomorrow, will need to follow up with primary neurologist and possibly nephrology referral to establish care in case this will be something she can (if needed) do as an outpatient  - IM B12 administered for significant deficiency, will need follow up with internal medicine for further treatment      Expected discharge after last cycle tomorrow, will sign off please page with ?s    I spent 26 minutes on the date of encounter with the patient and before and after the visit on activities detailed in the above note which may include reviewing the EMR, documenting clinical information, and communicating with other health care professionals.    Zurdo Giang MD  [facility] [phone]

## 2024-04-14 NOTE — PLAN OF CARE
Reason for Admission: CDIP flare, receiving PLEX treatments    Cognitive/Mentation: A/Ox 4  Neuros/CMS: Intact ex RLE 4/5, LLE 3/5, baseline neuropathy in feet/toes  VS: stable on home CPAP overnight.   GI: Last BM 4/12/24. Continent.  : voiding adequately, prefers to use purewick overnight. Continent.  Pulmonary: LS clear.  Pain: lower back pain + headache rated 8/10, PRN diluadid + migraine meds given w/ some relief.     Drains/Lines: R CVC site, L PICC SL  Skin: intact  Activity: Assist x 1 with GBW.  Diet: regular with thin liquids. Takes pills whole with water.     Therapies recs: home w/ assist  Discharge: likely Monday    Aggression Stoplight Tool: green - sitter at bedside overnight for safety    End of shift summary: plan to discharge back to group home Monday after last PLEX tx

## 2024-04-15 ENCOUNTER — TELEPHONE (OUTPATIENT)
Dept: NEPHROLOGY | Facility: CLINIC | Age: 34
End: 2024-04-15
Payer: COMMERCIAL

## 2024-04-15 ENCOUNTER — APPOINTMENT (OUTPATIENT)
Dept: OCCUPATIONAL THERAPY | Facility: CLINIC | Age: 34
DRG: 074 | End: 2024-04-15
Attending: HOSPITALIST
Payer: COMMERCIAL

## 2024-04-15 ENCOUNTER — APPOINTMENT (OUTPATIENT)
Dept: PHYSICAL THERAPY | Facility: CLINIC | Age: 34
DRG: 074 | End: 2024-04-15
Attending: HOSPITALIST
Payer: COMMERCIAL

## 2024-04-15 LAB
ERYTHROCYTE [DISTWIDTH] IN BLOOD BY AUTOMATED COUNT: 14.4 % (ref 10–15)
HCT VFR BLD AUTO: 33.6 % (ref 35–47)
HGB BLD-MCNC: 11.1 G/DL (ref 11.7–15.7)
MCH RBC QN AUTO: 30.6 PG (ref 26.5–33)
MCHC RBC AUTO-ENTMCNC: 33 G/DL (ref 31.5–36.5)
MCV RBC AUTO: 93 FL (ref 78–100)
PLATELET # BLD AUTO: 212 10E3/UL (ref 150–450)
RBC # BLD AUTO: 3.63 10E6/UL (ref 3.8–5.2)
WBC # BLD AUTO: 6.9 10E3/UL (ref 4–11)

## 2024-04-15 PROCEDURE — 258N000003 HC RX IP 258 OP 636: Performed by: PSYCHIATRY & NEUROLOGY

## 2024-04-15 PROCEDURE — 250N000013 HC RX MED GY IP 250 OP 250 PS 637: Performed by: HOSPITALIST

## 2024-04-15 PROCEDURE — 250N000009 HC RX 250: Performed by: PSYCHIATRY & NEUROLOGY

## 2024-04-15 PROCEDURE — 250N000011 HC RX IP 250 OP 636: Performed by: HOSPITALIST

## 2024-04-15 PROCEDURE — 250N000013 HC RX MED GY IP 250 OP 250 PS 637: Performed by: INTERNAL MEDICINE

## 2024-04-15 PROCEDURE — 258N000003 HC RX IP 258 OP 636: Performed by: INTERNAL MEDICINE

## 2024-04-15 PROCEDURE — 97530 THERAPEUTIC ACTIVITIES: CPT | Mod: GP | Performed by: PHYSICAL THERAPIST

## 2024-04-15 PROCEDURE — 99232 SBSQ HOSP IP/OBS MODERATE 35: CPT | Performed by: INTERNAL MEDICINE

## 2024-04-15 PROCEDURE — 250N000011 HC RX IP 250 OP 636: Performed by: INTERNAL MEDICINE

## 2024-04-15 PROCEDURE — 120N000001 HC R&B MED SURG/OB

## 2024-04-15 PROCEDURE — 250N000011 HC RX IP 250 OP 636: Mod: JZ | Performed by: INTERNAL MEDICINE

## 2024-04-15 PROCEDURE — 999N000040 HC STATISTIC CONSULT NO CHARGE VASC ACCESS

## 2024-04-15 PROCEDURE — 99233 SBSQ HOSP IP/OBS HIGH 50: CPT | Performed by: INTERNAL MEDICINE

## 2024-04-15 PROCEDURE — P9045 ALBUMIN (HUMAN), 5%, 250 ML: HCPCS | Mod: JZ | Performed by: INTERNAL MEDICINE

## 2024-04-15 PROCEDURE — 250N000009 HC RX 250: Performed by: INTERNAL MEDICINE

## 2024-04-15 PROCEDURE — 85027 COMPLETE CBC AUTOMATED: CPT | Performed by: INTERNAL MEDICINE

## 2024-04-15 PROCEDURE — 250N000011 HC RX IP 250 OP 636: Performed by: PSYCHIATRY & NEUROLOGY

## 2024-04-15 PROCEDURE — 36514 APHERESIS PLASMA: CPT

## 2024-04-15 PROCEDURE — 97535 SELF CARE MNGMENT TRAINING: CPT | Mod: GO

## 2024-04-15 RX ORDER — ACETAMINOPHEN 325 MG/1
975 TABLET ORAL EVERY 8 HOURS PRN
Status: DISCONTINUED | OUTPATIENT
Start: 2024-04-15 | End: 2024-04-16 | Stop reason: HOSPADM

## 2024-04-15 RX ORDER — ACETAMINOPHEN 325 MG/1
325 TABLET ORAL EVERY 4 HOURS PRN
Status: DISCONTINUED | OUTPATIENT
Start: 2024-04-15 | End: 2024-04-15

## 2024-04-15 RX ORDER — ALBUMIN HUMAN 25 %
3000 INTRAVENOUS SOLUTION INTRAVENOUS ONCE
Status: COMPLETED | OUTPATIENT
Start: 2024-04-15 | End: 2024-04-15

## 2024-04-15 RX ORDER — HEPARIN SODIUM 1000 [USP'U]/ML
2200 INJECTION, SOLUTION INTRAVENOUS; SUBCUTANEOUS
Status: DISCONTINUED | OUTPATIENT
Start: 2024-04-15 | End: 2024-04-16 | Stop reason: HOSPADM

## 2024-04-15 RX ORDER — MAGNESIUM SULFATE HEPTAHYDRATE 40 MG/ML
2 INJECTION, SOLUTION INTRAVENOUS ONCE
Status: COMPLETED | OUTPATIENT
Start: 2024-04-15 | End: 2024-04-15

## 2024-04-15 RX ADMIN — ENOXAPARIN SODIUM 40 MG: 40 INJECTION SUBCUTANEOUS at 20:35

## 2024-04-15 RX ADMIN — PREGABALIN 200 MG: 100 CAPSULE ORAL at 20:33

## 2024-04-15 RX ADMIN — Medication 50 MCG: at 08:14

## 2024-04-15 RX ADMIN — SODIUM CHLORIDE 1000 MG: 9 INJECTION, SOLUTION INTRAVENOUS at 20:35

## 2024-04-15 RX ADMIN — ALBUMIN HUMAN 3000 ML: 0.05 INJECTION, SOLUTION INTRAVENOUS at 09:06

## 2024-04-15 RX ADMIN — CYCLOBENZAPRINE 10 MG: 10 TABLET, FILM COATED ORAL at 01:38

## 2024-04-15 RX ADMIN — CETIRIZINE HYDROCHLORIDE 10 MG: 10 TABLET, FILM COATED ORAL at 20:33

## 2024-04-15 RX ADMIN — HYDROMORPHONE HYDROCHLORIDE 2 MG: 2 TABLET ORAL at 11:27

## 2024-04-15 RX ADMIN — HYDROMORPHONE HYDROCHLORIDE 1 MG: 2 TABLET ORAL at 11:36

## 2024-04-15 RX ADMIN — FERROUS SULFATE TAB 325 MG (65 MG ELEMENTAL FE) 325 MG: 325 (65 FE) TAB at 08:14

## 2024-04-15 RX ADMIN — HYDROMORPHONE HYDROCHLORIDE 3 MG: 2 TABLET ORAL at 06:10

## 2024-04-15 RX ADMIN — BUTALBITAL, ACETAMINOPHEN AND CAFFEINE 1 TABLET: 50; 325; 40 TABLET ORAL at 15:52

## 2024-04-15 RX ADMIN — HEPARIN SODIUM 5000 UNITS: 1000 INJECTION INTRAVENOUS; SUBCUTANEOUS at 01:40

## 2024-04-15 RX ADMIN — ONDANSETRON 4 MG: 4 TABLET, ORALLY DISINTEGRATING ORAL at 14:44

## 2024-04-15 RX ADMIN — VALPROATE SODIUM 500 MG: 100 INJECTION, SOLUTION INTRAVENOUS at 20:35

## 2024-04-15 RX ADMIN — CALCIUM GLUCONATE 3 G: 98 INJECTION, SOLUTION INTRAVENOUS at 09:07

## 2024-04-15 RX ADMIN — ENOXAPARIN SODIUM 40 MG: 40 INJECTION SUBCUTANEOUS at 08:15

## 2024-04-15 RX ADMIN — HYDROMORPHONE HYDROCHLORIDE 3 MG: 2 TABLET ORAL at 19:46

## 2024-04-15 RX ADMIN — PANTOPRAZOLE SODIUM 40 MG: 40 TABLET, DELAYED RELEASE ORAL at 08:15

## 2024-04-15 RX ADMIN — MAGNESIUM SULFATE HEPTAHYDRATE 2 G: 40 INJECTION, SOLUTION INTRAVENOUS at 18:50

## 2024-04-15 RX ADMIN — MONTELUKAST 10 MG: 10 TABLET, FILM COATED ORAL at 20:33

## 2024-04-15 RX ADMIN — PREGABALIN 100 MG: 100 CAPSULE ORAL at 08:14

## 2024-04-15 RX ADMIN — NORETHINDRONE ACETATE 5 MG: 5 TABLET ORAL at 08:13

## 2024-04-15 RX ADMIN — ANTICOAGULANT CITRATE DEXTROSE SOLUTION FORMULA A 1000 ML: 12.25; 11; 3.65 SOLUTION INTRAVENOUS at 09:06

## 2024-04-15 RX ADMIN — DIPHENHYDRAMINE HYDROCHLORIDE 50 MG: 25 CAPSULE ORAL at 22:01

## 2024-04-15 ASSESSMENT — ACTIVITIES OF DAILY LIVING (ADL)
ADLS_ACUITY_SCORE: 38
ADLS_ACUITY_SCORE: 36
ADLS_ACUITY_SCORE: 38
ADLS_ACUITY_SCORE: 40
ADLS_ACUITY_SCORE: 38
ADLS_ACUITY_SCORE: 40
ADLS_ACUITY_SCORE: 38
ADLS_ACUITY_SCORE: 40
ADLS_ACUITY_SCORE: 38
ADLS_ACUITY_SCORE: 40
ADLS_ACUITY_SCORE: 40
ADLS_ACUITY_SCORE: 36
ADLS_ACUITY_SCORE: 40

## 2024-04-15 NOTE — PROGRESS NOTES
Treatment in room d 40 using Optia apheresis machine. Consent verified.     Height: 5 ft 2in  Weight: 263kg  HCT: 33%  Inlet speed: 75  ACDA ratio: 10:1  Fluid balance: 100  Access: Right cvc     Replacement product: Albumin 25% 3000ml  Electrolyte replacement: Ca Gluconate 3grams in NS - total 80mls, piggybacked into return lines, infused over time of treatment.    Premedications: none     Treatment Notes: tolerated treatment with no issues     Treatment completed as ordered, VSS, see EPIC flowsheet for run data.     Next treatment: This is the last treatment scheduled    Royce Jung RN

## 2024-04-15 NOTE — PROGRESS NOTES
"Neurology Progress Note.    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 Treated with PLEX given no response to IVIG and steroids in the past.   Vit B12 was very low.   Now patient reporting persistent headaches since her spinal tap. Exam unremarkable. Description is concerning for post-LP headaches.   Discussed that she also likely has medication-overuse headache from Fioricet.   -will consult anesthesia for blood patch  -meanwhile will give migraine IV meds once - magnesium, solumedrol and depakote in order to decrease headache and will have patient use Fioricet NOT MORE THAN 2 DAYS PER WEEK  -if gets better will recommend discharge on medrol dose pack taper      Thank you for allowing me to participate in cares of this patient. Please contact me with any questions of concerns (pager [phone]).     Subjective: No acute events overnight.  Today patient reported that has headaches every day. Has to take her Fioricet every 4 hours every day for the last few days.   Pain in the forehead and behind eyes, pressure. Gets significantly worse when patient is sitting or trying to ambulate. She had some migraines as a child, but no headaches lately.   Patient has lower back pain since LP as well, but overall her legs are stronger except left foot flexion.     Objective:  /78 (BP Location: Other (Comment))   Pulse 99   Temp 98.8  F (37.1  C) (Oral)   Resp 16   Ht 1.575 m (5' 2\")   Wt 119.3 kg (263 lb)   LMP 07/12/2023   SpO2 97%   BMI 48.10 kg/m    General: no acute distress  Neuro:  Alert, oriented. Speech clear and language normal.  Pupils equal and reactive to light. Visual fields full to confrontation. Extra-ocular movements without restrictions.  Face symmetric. Facial sensation normal.  Hearing intact bilaterally to voice.  Palate elevates symmetrically. " Tongue midline. Shoulder shrug is equal bilaterally.  Motor:  Normal strength in all four extremities, except left foot dorsiflexion 4/5. Tone and bulk normal.   Sensory: normal to light touch bilaterally.  DTR: symmetric, 2+ in all four extremities.  Toes mute bilaterally.   Coordination: normal finger-to-nose bilaterally.  Gait: deferred    I have personally reviewed all the labs and imaging studies. Pertinent findings: Vit B12 <150     MRI brain without and with Contrast (4/8/2024)  IMPRESSION:  1.  No recent infarct, intracranial mass, pathologic enhancement or evidence of intracranial hemorrhage.  2.  Scattered nonspecific foci of nonenhancing T2 signal hyperintensity in the supratentorial white matter in a random distribution. Differential considerations include foci of nonspecific gliosis or chronic myelin loss, sequelae of chronic headaches and   age-advanced chronic small vessel ischemic changes. The appearance is unchanged compared with the brain MRI of 10/12/2023.     MRI Cervical Spine without and with Contrast (4/6/2024)  IMPRESSION:  1.  No abnormal signal or enhancement within the cervical cord.     2.  DDD change at C5-C6 with loss of disc height. Shallow chronic central/right paracentral disc bulge at C5-C6 results in subtle attenuation of the anterior/right aspect of the subarachnoid space and causes subtle contact of the anterior/right aspect of   the cervical cord as seen on previous MRI scan performed 02/24/2021.        MRI Lumbar Spine without and with  Contrast (3/30/2024)  IMPRESSION:  1.  Cauda equina nerve roots appear mildly enlarged as well as the lower lumbar spine and sacral exiting nerve roots. Some of these nerve roots demonstrate thin nonnodular enhancement. Findings may reflect chronic inflammatory demyelinating   polyneuropathy, Charcot-Monique-Tooth, neurofibromatosis type I. Enhancement could suggest acute infectious inflammatory process superimposed on chronic findings.     2.   Transitional lumbosacral anatomy described above.     3.  No significant degenerative changes.     4.  No significant thecal sac stenosis. No significant neural foraminal stenosis.     Repeat MRI Lumbar Spine without and with Contrast (4/13/2024)  IMPRESSION:  1.  When compared to 03/30/2024, there is overall unchanged appearance of abnormal enlargement of multiple bilateral exiting nerve roots. There has been slight interval decrease in degree of linear enhancement involving the distal cauda equina beginning   at the L4 level.     2.  No new acute finding.      3.  No significant spinal canal or neural foraminal stenosis.         CSF   RBC - 0  WBC - 1  Protein - 104.9         Total time of visit: 50 minutes with the time spent on chart review, imaging and lab results review, and more than 50 % of the time spent on coordination of cares and face-to-face time with the patient including counseling regarding pathophysiology of the above conditions, results of the tests and further directions of care.     Cally Orlando MD  [facility]

## 2024-04-15 NOTE — PROVIDER NOTIFICATION
Paged Hospitalist re: PLEX treatment.  See sticky note from pharmacy. Will await further advisement.

## 2024-04-15 NOTE — PROVIDER NOTIFICATION
MD Notification    Notified Person: MD    Notified Person Name:Mallory    Notification Date/Time:4/15/24@0130    Notification Interaction:[place] messaging and phone call    Purpose of Notification:Patient ?mistakenly unclamped her CVC catheter and bleed quite a bit from there before we noticed, lost approximately 20-30cc of blood from there, site was cleaned, and heparin locked. Her vitals are stable, she does complain of slight dizziness, any new orders?    Orders Received:None for now pt is stable, will check CBC in the morning    Comments:

## 2024-04-15 NOTE — PROVIDER NOTIFICATION
IR staff phoned re: removal of right chest port.  Cannot remove port today, but will plan for tomorrow/time unknown. Does not need to be npo. Will update hospitalist.

## 2024-04-15 NOTE — PLAN OF CARE
[id number]     Reason for Admission: CDIP flare-up     Cognitive/Mentation: A/Ox 4  Neuros/CMS: Intact ex RLE 4/5, LLE 3/5, baseline neuropathy in feet/toes + hands  VS: stable on home CPAP overnight.   GI: Last BM 4/14/24. Continent.  : voiding adequately, prefers to use purewick overnight. Continent.  Pulmonary: LS clear.  Pain: lower back pain + headache rated 8/10, PRN PO dilaudid + migraine meds given      Drains/Lines: R CVC site, L PICC SL  Skin: intact  Activity: Assist x 1 with GB.  Diet: regular with thin liquids. Takes pills whole with water.      Therapies recs: back to group home  Discharge: likely Monday     Aggression Stoplight Tool: green - sitter at bedside overnight for safety     End of shift summary: plan to discharge back to group home Monday after last PLEX tx

## 2024-04-15 NOTE — PLAN OF CARE
Goal Outcome Evaluation:      Plan of Care Reviewed With: patient    Overall Patient Progress: improving    Reason for Admission: Presented to [facility] with new onset backpain and weakness, unable to amb.MRI and L.P confirmed CIDP flare up, transferred here for plasma exchange every 48hrs, have completed 6/7 treatment, last treatment is scheduled for today 4/15.     Cognitive/Mentation: A&OX4  Neuros/CMS: Baseline neuropathy hands and feet.LLE 3/5, RLE 4/5  VS:VSS on RA, used home CPAP overnight  Tele:n/a  GI: Continent, last BM yesterday  : Continent, used purewick overnight per her request, good UOP  Pulmonary:LS clear  Pain: C/o of back pain at change of shift and requesting I.V dilaudid, same was give, ice applied to lower back, later received flexeril and later po dilaudid.PRN ESGIC and Tylenol also available.     Drains/Lines:Lt upper arm tripple lumen PICC s/l, Rt double lumen CVC rt subclavian cath for FLEX trt.  Skin: intact  Activity: SBA with walker, declines gait belt  Diet: Regular, thin liquids, pills whole     Therapies recs:  Anticipated Discharge date & active delays:      Aggression Stoplight Tool:Green, bedside attendant in place  Other important info for next shift: Pt mistakenly unclamped her CVC overnight, lost few bld from there, site cleaned and heparin locked, MD notified,ordered CBC.

## 2024-04-15 NOTE — TELEPHONE ENCOUNTER
[facility] Call Center    Phone Message    May a detailed message be left on voicemail: yes     Reason for Call: Other: Patient being referred for CIDP (chronic inflammatory demyelinating polyneuropathy) (H) by referring provider. This Dx is not listed in guidelines for scheduling. Sending encounter message for review and follow-up with Pt for scheduling. Thank you!      Action Taken: Message routed to:  [facility] ([facility]): NEPH    Travel Screening: Not Applicable

## 2024-04-15 NOTE — PLAN OF CARE
Goal Outcome Evaluation:      Plan of Care Reviewed With: patient        CIDP flare up/PLEX treatment.  Neuros - alert  & oriented, following commands, letting her needs be known, chronic neuropathy/decreased sensation top of left foot/toes bilaterally; weak dorsi/plantar flexion on left. VSS, RA, declining use of home cpap for naps.  Regular diet/1400 lunch consumed, encouraged/fair appetite; meds whole with water. Up with assist/gait belt & walker to bathroom. Voiding fine, no bm today, positive flatus/occasional frequency/purwik discontinued. Left PICC line present/need to obtain orders for removal when ready for discharge. Back discomfort along spine, dilaudid helpful/& icepacks used on back. Stated did not sleep well overnight/took a nap after dialysis. Pt scoring green on the Aggression Stop Light Tool. Anticipate discharge back to group home in coming days once seen by neurology for headache management, right chest port removed in IR, left PICC removed before discharge, & possible consult with psychiatry if able to round before discharge. Patient agrees with plan. Discharging with caregiver from group home with home cares when medically ready. 1:1 sitter at bedside for safety. Off floor for 7th plex treatment this am.

## 2024-04-15 NOTE — PROGRESS NOTES
[facility]    Hospitalist Progress Note    Interval History :  Patient care was assumed this morning, patient was alert and awake.  Bedside nursing also present.  Discussed with patient regarding possible discharge to group home as patient has undergone her last Plex treatment.  Patient had multiple concerns, told me that she is not ready for discharge today.  Patient is constantly complaining of headache, told me that previous neurologist told her about possible need for blood patch although it was not recommended by the weekend neurologist we discussed about neurology evaluation today to evaluate her headache further.  Long discussion with patient regarding decreasing use of Dilaudid as it can worsen her chronic headache.  Discussed with patient that she will not be discharged on any pain medications as her dialysis catheter is out.  Patient also would like to know if neurology would consider outpatient Plex treatment intermittently to help relieve her symptoms in the future, encouraged her to continue to have discussion with neurology regarding their recommendations.  She also mention regarding getting some help in arranging ride for a group home,.  Patient her guardian is not available till Wednesday.  Discussed with her that of her care coordinator/social workers are following closely.    -Data reviewed today: I reviewed all new labs and imaging results over the last 24 hours. I personally reviewed the mr cervical spine  image(s) showing no abnormal signal . Ddd changes at c5-c6 with loss of disc height  .    Physical Exam   Temp: 98.8  F (37.1  C) Temp src: Oral BP: 125/78 Pulse: 99 (after walking)   Resp: 17 SpO2: 97 % O2 Device: None (Room air)    Vitals:    04/13/24 1000 04/15/24 0815   Weight: 119.5 kg (263 lb 6.4 oz) 119.3 kg (263 lb)     Vital Signs with Ranges  Temp:  [98.1  F (36.7  C)-99  F (37.2  C)] 98.8  F (37.1  C)  Pulse:  [82-99] 99  Resp:  [9-23] 17  BP:  (107-137)/(68-87) 125/78  SpO2:  [96 %-97 %] 97 %  I/O last 3 completed shifts:  In: 321 [P.O.:300; I.V.:21]  Out: 900 [Urine:900]    Physical Exam  Vitals and nursing note reviewed.   Constitutional:       Appearance: She is well-developed.   HENT:      Head: Normocephalic and atraumatic.   Eyes:      Pupils: Pupils are equal, round, and reactive to light.   Neck:      Thyroid: No thyromegaly.   Cardiovascular:      Rate and Rhythm: Normal rate and regular rhythm.      Heart sounds: Normal heart sounds.   Pulmonary:      Effort: Pulmonary effort is normal. No respiratory distress.      Breath sounds: Normal breath sounds.   Abdominal:      General: Bowel sounds are normal. There is no distension.      Palpations: Abdomen is soft.   Musculoskeletal:         General: No tenderness. Normal range of motion.      Cervical back: Normal range of motion and neck supple.   Skin:     General: Skin is warm and dry.   Neurological:      Mental Status: She is alert and oriented to person, place, and time.   Psychiatric:         Behavior: Behavior normal.       Medications   Current Facility-Administered Medications   Medication Dose Route Frequency Provider Last Rate Last Admin     Current Facility-Administered Medications   Medication Dose Route Frequency Provider Last Rate Last Admin    cetirizine (zyrTEC) tablet 10 mg  10 mg Oral At Bedtime Brionna Robertson DO   10 mg at 04/14/24 1953    enoxaparin ANTICOAGULANT (LOVENOX) injection 40 mg  40 mg Subcutaneous Q12H Xena Ramirez MD   40 mg at 04/15/24 0815    ferrous sulfate (FEROSUL) tablet 325 mg  325 mg Oral Daily with breakfast Melida Cedillo MD   325 mg at 04/15/24 0814    montelukast (SINGULAIR) tablet 10 mg  10 mg Oral QPM Melida Cedillo MD   10 mg at 04/14/24 1953    norethindrone (AYGESTIN) tablet 5 mg  5 mg Oral Daily Melida Cedillo MD   5 mg at 04/15/24 0813    pantoprazole (PROTONIX) EC tablet 40 mg  40 mg Oral Daily Melida Cedillo  MD Hailey   40 mg at 04/15/24 0815    pregabalin (LYRICA) capsule 100 mg  100 mg Oral QAM Melida Cedillo MD   100 mg at 04/15/24 0814    pregabalin (LYRICA) capsule 200 mg  200 mg Oral At Bedtime Brionna Robertson DO   200 mg at 04/14/24 1953    psyllium (METAMUCIL/KONSYL) Packet 1 packet  1 packet Oral Daily Melida Cedillo MD   1 packet at 04/14/24 0920    Semaglutide-Weight Management (WEGOVY) pen SOAJ 1 mg  1 mg Subcutaneous Weekly Brionna Robertson DO   1 mg at 04/14/24 0920    sodium chloride (PF) 0.9% PF flush 10-40 mL  10-40 mL Intracatheter Q7 Days Xena Ramirez MD   10 mL at 04/11/24 1427    vitamin D3 (CHOLECALCIFEROL) tablet 50 mcg  50 mcg Oral Daily Melida Cedillo MD   50 mcg at 04/15/24 0814       Data   Recent Labs   Lab 04/15/24  0616 04/13/24  0627 04/10/24  1157   WBC 6.9  --   --    HGB 11.1*  --   --    MCV 93  --   --     170 165   CR  --  0.55 0.54       No results found for this or any previous visit (from the past 24 hour(s)).          Assessment & Plan   Acute bilateral lower extremity paralysis dt CIDP flare, s/p PLEX starting 4/2  CIDP (chronic inflammatory demyelinating polyneuropathy) (H) (4/1/2024)  Chronic bilateral LE neuropathy, sec to above  Persistent headache since LP:     *Has history of CIDP with peripheral neuropathy.  She underwent treatments with IVIG in the past.  *Initially presented to [facility] ED on 3/30/2024 for evaluation of new onset back pain and bilateral lower extremity weakness (mostly experiences numbness, so weakness was a new symptom for her).  She was unable to ambulate.  *In the ED, labs were generally unremarkable.  General neurology was consulted.  She underwent further evaluation with MRI of the thoracic and lumbar spines.  MRI of the thoracic spine was unremarkable.  MRI lumbar spine showed enlargement of the cauda equina nerve roots and enhancement which was suggestive of CIDP per neurology.  She underwent a  lumbar puncture which showed elevated protein and albumin cytologic dissociation which confirmed the diagnosis of CIDP.  She was given 1 g of IV Solu-Medrol and transferred to [facility] to initiate plasma exchange. PICC line was placed.  *Tunneled catheter placed per IR on 4/2 and patient underwent PLEX treatment   *MRI of the cervical spine with and without contrast completed last 04/06 due to new right upper extremity weakness showing no abnormal signal or enhancement within the cervical cord.  DDD changes at C5-C6 levels were noticed.  Shallow chronic central/right paracentral disc bulge that attenuates the anterior aspect of the subarachnoid spaces and causes subtle contrast of the anterior aspect of the cervical cord as seen in the previous MRI in 2021  *MRI brain ordered in the setting of ongoing headache showed no recent infarct, mass or hemorrhage.  Scattered nonspecific nonenhancing T2 signal hyperintensity in the supratentorial white matter.  Chronic small vessel ischemic changes.  Unchanged compared to prior MRI in 2023.  *Bilateral lower extremity DVT ultrasound done for swelling.  Negative.  *Repeat MRI lumbar spine on 4/13/2024 for persistent low back pain showed no new findings.  Continues to have nerve root enhancement.  *Patient has been undergoing Plex therapy with plan for a total of 7 treatments every 48 hours.  Underwent treatment 7 today on 4/12/2024. Plex therapy being managed by nephrology.        -- Continue routine neurochecks and fall precautions  -- Continue PTA Lyrica (100mg in AM and 200mg HS)  -- PT/OT following, cleared for discharge back to her group home with home therapy at this point, will be ordered on discharge , she has another OT session to be completed at 1:30 AM  -- Will discontinue Dilaudid, which patient has been using for back pain but might be contributing to her headache due to polypharmacy patient is also at risk for developing medication dependent  "headache  -- Neurology has signed off on 04/14/2024  -- Patient has multiple questions regarding her persistent headache, questioning if she would benefit from blood patch, will reconsult general neurology if they can address patient concerns.  -- Caffeine 200 mg daily was added to regimen but was discontinued later as was not helpful  -- Will add low-dose Tylenol along with extra strength Tylenol not exceeding more than 4 g total per day consumption.  -- Patient had multiple questions about continuing Plex therapy as outpatient.  Currently based neurology chart review there appears to be no plan to continue ongoing outpatient   -- Discussed with patient that we can try to give her follow-up with general neurology who has been following up with her during this current hospitalization.  --Tentative discharge tomorrow once patient has been evaluated by neurology and assessment for need for blood patch has been addressed.  -- Discontinue Lidoderm patch per patient request  -- Plan to discharge patient back to group home with home PT and OT.      Vitamin B12 deficiency  --Vitamin B12 level less than 150.  Methylmalonic acid level pending.  --One-time dose of 1000 mcg IM given on 4/8/2024.  Follow-up with outpatient PCP for further dosing.  --Patient has elevated homocystine level at 17.1 which can be seen with vitamin B12 deficiency.  This should be rechecked in 1 month    Anxiety  Depression  Bipolar disorder  Chronic pain   Hx suspected medication seeking behavior  Frequent medical noncompliance  Frequent utilization of healthcare system  *Complicated psychiatric history.  Patient has a guardian and noted ED treatment plan.  She stated to admitting provider that she may be able to come off her guardianship as \"she is doing really well\" and she was planning to move out of her group home and back to an apartment.  *Psychiatry was consulted while she was at [facility], she was seen by DEC counselor while in the ED.  [place] " "that she was medically cleared to discharge once medical issues had stabilized.    -- Continue on Lyrica as above and Klonopin as needed.  -- Discontinuing oral and IV Dilaudid  --Patient is refusing lidocaine patch, will discontinue  --L[place]ell available for neck pain.  --Started on butalbital with caffeine for headaches which helped with symptom control.  --Patient requested psychiatry consult to help with anxiety.  Consult placed.  --Appreciate if patient can be evaluated before discharge by psychiatry, per patient her discharge does not need to be postponed if psychiatry has not seen patient.     Hx self injurious behavior w/foreign body ingestion  *Hx of 47 endoscopies in last 4 years for foreign body extractions ([place], mickie hair pins, etc) so far during this hospital stay there has no noted self injurious behavior     Chronic abdominal pain  *Noted subacute/chronic issue. Seen at [facility] on 3/29/24 and had normal EGD then left AMA; path report negative for dysplasia, gastritis, or Helicobacter.  *Abx xray 3/31 was nl.   *Chronic and stable on PPI.      Morbid obesity  *Is on Wegovy, reports weight loss of 60 pounds recently. [place] to receive this medication while in the hospital, was given on 3/31.  *Will continue weekly on Sundays while hospitalized, brought in home supply to use.     ZOHRA  *Brought in home CPAP to use on 4/3.     Elevated INR  -INR at 1.23.  Unclear etiology.  LFTs within normal range  -Could have fatty liver in the setting of obesity    Clinically Significant Risk Factors                         # Severe Obesity: Estimated body mass index is 48.1 kg/m  as calculated from the following:    Height as of this encounter: 1.575 m (5' 2\").    Weight as of this encounter: 119.3 kg (263 lb).        # Financial/Environmental Concerns: none          DVT Prophylaxis: Lovenox 40 SQ twice daily  Code Status: Full Code  Disposition: Expected discharge after completing Plex therapy.  Worked on " discharge today back to group home, patient still has tunneled catheter which IR can be removed only tomorrow.  Patient will also need neurology evaluation for possible blood patch treatment, hoping patient can be discharged back to group home tomorrow 04/16/2024.  Medically Ready for Discharge: expected to be ready tomorrow     Medical Decision Making     55 MINUTES SPENT BY ME on the date of service doing chart review, history, exam, documentation & further activities per the note.        Shelia Goyal MD, FACP

## 2024-04-15 NOTE — PROGRESS NOTES
" Renal Medicine Progress Note            Assessment/Plan:     Indication:  CIDP     1 volume exchange with 5% albumin: 3 liters of albumin and 3 grams calcium gluconate.     Plan:  # 7/7 apheresis treatment  # Order placed for IR to removed TDC          Interval History:     Afebrile. VSS.   She is getting her 7/7 apheresis treatment.   No issues with treatment so far.     Last night the dialysis cathter was unclamped and the caps were removed with some blood lost.   \" I would never do that to myself,\" she says.     She walked from the PT room to her room unassisted.             Medications and Allergies:     Current Facility-Administered Medications   Medication Dose Route Frequency Provider Last Rate Last Admin    cetirizine (zyrTEC) tablet 10 mg  10 mg Oral At Bedtime Brionna Robertson DO   10 mg at 04/14/24 1953    enoxaparin ANTICOAGULANT (LOVENOX) injection 40 mg  40 mg Subcutaneous Q12H Xena Ramirez MD   40 mg at 04/15/24 0815    ferrous sulfate (FEROSUL) tablet 325 mg  325 mg Oral Daily with breakfast Melida Cedillo MD   325 mg at 04/15/24 0814    furosemide (LASIX) tablet 20 mg  20 mg Oral Daily Xena Ramirez MD   20 mg at 04/14/24 0921    Lidocaine (LIDOCARE) 4 % Patch 1 patch  1 patch Transdermal Q24H Xena Ramirez MD   1 patch at 04/11/24 1243    montelukast (SINGULAIR) tablet 10 mg  10 mg Oral QPM Melida Cedillo MD   10 mg at 04/14/24 1953    norethindrone (AYGESTIN) tablet 5 mg  5 mg Oral Daily Melida Cedillo MD   5 mg at 04/15/24 0813    pantoprazole (PROTONIX) EC tablet 40 mg  40 mg Oral Daily Melida Cedillo MD   40 mg at 04/15/24 0815    pregabalin (LYRICA) capsule 100 mg  100 mg Oral QAM Melida Cedillo MD   100 mg at 04/15/24 0814    pregabalin (LYRICA) capsule 200 mg  200 mg Oral At Bedtime Brionna Robertson DO   200 mg at 04/14/24 1953    psyllium (METAMUCIL/KONSYL) Packet 1 packet  1 packet Oral Daily Melida Cedillo MD   1 " packet at 04/14/24 0920    Semaglutide-Weight Management (WEGOVY) pen SOAJ 1 mg  1 mg Subcutaneous Weekly Ailyn Brionna Tea, DO   1 mg at 04/14/24 0920    sodium chloride (PF) 0.9% PF flush 10-40 mL  10-40 mL Intracatheter Q7 Days Xena Ramirez MD   10 mL at 04/11/24 1427    vitamin D3 (CHOLECALCIFEROL) tablet 50 mcg  50 mcg Oral Daily Melida Cedillo MD   50 mcg at 04/15/24 0814        Allergies   Allergen Reactions    Amoxicillin-Pot Clavulanate Other (See Comments), Swelling and Rash     PN: facial swelling, left side. Also had cortisone injection the same day as she started the Augmentin.          Influenza Vaccines Other (See Comments) and Nausea and Vomiting     HUT Reaction: Nausea And Vomiting; HUT Reaction Type: Intolerance; HUT Severity: Low; HUT Noted: [id number]    Latex Rash           Oseltamivir Hives    Penicillins Anaphylaxis    Vancomycin Itching, Swelling and Rash     Flushed face, very itchy    Hydrocodone Nausea and Vomiting and GI Disturbance     vomiting for days    Blood-Group Specific Substance Other (See Comments)     Patient has an anti-Cw and non-specific antibodies. Blood product orders may be delayed. Draw one red top and two purple top tubes for all type/screen/crossmatch orders.  Patient has anti-Cw, anti-K ([place]), Warm auto and nonspecific antibodies. Blood products may be delayed. Draw patient 24 hours prior to transfusion. Draw one red top and two purple top tubes for all type and screen orders.    Clavulanic Acid Angioedema    Haemophilus B Polysaccharide Vaccine Nausea and Vomiting    Oxycodone Swelling    Adhesive Tape Rash     Silicone type  Adhesive allergy      Band-Aid Anti-Itch      Other reaction(s): adhesive allergy    Cephalosporins Rash    Fentanyl Itching    Lamotrigine Rash     Possibly associated with Lamictal.     Naltrexone Other (See Comments)     Reaction(s): Vivid dreams.    No Clinical Screening - See Comments Other (See Comments)     History of  "swallowing sharp metallic objects. She should not be prescribed lancets due to posed risk of swallowing.             Physical Exam:   Vitals were reviewed   , Blood pressure 135/81, pulse 92, temperature 98.8  F (37.1  C), temperature source Oral, resp. rate 15, height 1.575 m (5' 2\"), weight 119.3 kg (263 lb), last menstrual period 07/12/2023, SpO2 96%, not currently breastfeeding.    Wt Readings from Last 3 Encounters:   04/15/24 119.3 kg (263 lb)   03/31/24 114.6 kg (252 lb 10.4 oz)   03/29/24 112.9 kg (249 lb)       Intake/Output Summary (Last 24 hours) at 4/15/2024 1020  Last data filed at 4/15/2024 0700  Gross per 24 hour   Intake 340 ml   Output 900 ml   Net -560 ml       GENERAL APPEARANCE: pleasant, NAD, alert  HEENT:  Eyes/ears/nose/neck grossly normal  RESP: lungs cta b c good efforts, no crackles, rhonchi or wheezes  CV: RRR  ABDOMEN: o/s/nt/nd, bs present  EXTREMITIES/SKIN: no rashes/lesions on observed skin;   NEURO: Awake, alert and communicative. Talkative.          Data:     CBC RESULTS:     Recent Labs   Lab 04/15/24  0616 04/13/24  0627 04/10/24  1157   WBC 6.9  --   --    RBC 3.63*  --   --    HGB 11.1*  --   --    HCT 33.6*  --   --     170 165       Basic Metabolic Panel:  Recent Labs   Lab 04/13/24  0627 04/10/24  1157   CR 0.55 0.54       INRNo lab results found in last 7 days.   Attestation:   I have reviewed today's relevant vital signs, notes, medications, labs and imaging.    Jonh Messina MD  [facility] - Nephrology  Office phone :[phone]  Pager: [phone]   "

## 2024-04-15 NOTE — PROGRESS NOTES
Care Management Follow Up    Length of Stay (days): 14    Expected Discharge Date: 04/16/2024     Concerns to be Addressed:       Patient plan of care discussed at interdisciplinary rounds: Yes    Anticipated Discharge Disposition: Group Home, Home Care     Anticipated Discharge Services:    Anticipated Discharge DME:      Patient/family educated on Medicare website which has current facility and service quality ratings:    Education Provided on the Discharge Plan:    Patient/Family in Agreement with the Plan:      Referrals Placed by CM/SW: Homecare  Private pay costs discussed: Not applicable    Additional Information:  Spoke to Cherry Pickett ([phone]),  contact.  Updated her with current therapy recommendations (SBA needed).  She stated they will be able to provide the assistance she needs including more assistance with meal prep and ADLs.  The  staff should be able to provide transportation.  Let her know we are trying to obtain home care (PT and OT) but her insurance may be a barrier to finding an accepting agency.  If needed, they could provide transportation to OP appointments.    Addendum @ 1023:  Updated by Hospitalist patient may be ready to discharge later this afternoon. Confirmed with Cherry Pickett that patient may return at any time.  Any new prescriptions to be filled at hospital pharmacy or patient's preferred pharmacy as she manages her own medications.  Discharge orders to be faxed to [phone].    Addendum @ 7099:  [facility] has accepted patient for [facility] PT and OT.  They have already contacted patient for scheduling. Spoke to patient about discharge plan.  She agrees to PCP appointment being scheduled.  She would like Neurology follow up at [facility] in [place].  She expressed concern that her guardian texted her that she won't be available until Wed this week due to a family issue.  She thinks her guardian should be aware of discharge plan. Attempted to contact her guardian Judith. Left VM to  have call returned.  Also tried to call [employer] but went right to VM and said the VM box was full.      Attempted to schedule hospital follow up but no appts available in next week then her provider will be on vacation.   sent message to the care team to see if they would fit her in and contact patient for scheduling.    Attempted to schedule Neurology follow up at [facility] ([phone]) but unable to get through to clinic.  Tried [facility] and had same problem.    Addendum @ 1601:  Received VM late in day from Marianna Trujillo ([phone]), supervisor at [email or web address].  She is apparently covering for patient's regular guardian.  Unable to return call at this time.  Left a VM for Chitra in IR with contact  information as they were trying to reach guardian to obtain a consent.    Lola Elise RN, BSN, PHN  Inpatient Care Coordination  [facility]  Phone: [phone]

## 2024-04-15 NOTE — CONSULTS
Patient is on IR schedule Tuesday 4/16/24 for a Tunneled apheresis catheter removal. Placed on 4/2/24 for PLEX.     -Labs WNL for procedure.    -No NPO required.   -Consent will be done prior to procedure. Message has been left with the patient's guardian to call back.     Please contact the IR department at [phone] for procedural related questions.     Total time: 15 minutes.    Thanks,Chitra [facility] Interventional Radiology CNP ([phone]) (phone [phone])

## 2024-04-15 NOTE — PLAN OF CARE
Goal Outcome Evaluation:      Plan of Care Reviewed With: patient        Patent alert, oriented times 4. Nuero's intact, neuropathy in BLE's. C/o headache, pain meds given. VSS. Up with SBA, ambulates to bathroom. On a regular diet. Continent of bowel and bladder. Plan to have Port removed tomorrow in IR, also needs PICC line removed, discharge back to [facility] tomorrow.

## 2024-04-16 ENCOUNTER — APPOINTMENT (OUTPATIENT)
Dept: GENERAL RADIOLOGY | Facility: CLINIC | Age: 34
DRG: 074 | End: 2024-04-16
Attending: INTERNAL MEDICINE
Payer: COMMERCIAL

## 2024-04-16 ENCOUNTER — APPOINTMENT (OUTPATIENT)
Dept: INTERVENTIONAL RADIOLOGY/VASCULAR | Facility: CLINIC | Age: 34
DRG: 074 | End: 2024-04-16
Attending: INTERNAL MEDICINE
Payer: COMMERCIAL

## 2024-04-16 VITALS
HEIGHT: 62 IN | WEIGHT: 263 LBS | BODY MASS INDEX: 48.4 KG/M2 | TEMPERATURE: 98.4 F | HEART RATE: 92 BPM | OXYGEN SATURATION: 92 % | RESPIRATION RATE: 16 BRPM | DIASTOLIC BLOOD PRESSURE: 74 MMHG | SYSTOLIC BLOOD PRESSURE: 127 MMHG

## 2024-04-16 LAB
CREAT SERPL-MCNC: 0.5 MG/DL (ref 0.51–0.95)
EGFRCR SERPLBLD CKD-EPI 2021: >90 ML/MIN/1.73M2
METHYLMALONATE SERPL-SCNC: 0.31 UMOL/L (ref 0–0.4)
PLATELET # BLD AUTO: 240 10E3/UL (ref 150–450)

## 2024-04-16 PROCEDURE — 99207 PR NO BILLABLE SERVICE THIS VISIT: CPT | Performed by: REGISTERED NURSE

## 2024-04-16 PROCEDURE — 999N000154 HC STATISTIC RADIOLOGY XRAY, US, CT, MAR, NM

## 2024-04-16 PROCEDURE — 250N000013 HC RX MED GY IP 250 OP 250 PS 637: Performed by: INTERNAL MEDICINE

## 2024-04-16 PROCEDURE — 250N000009 HC RX 250: Performed by: INTERNAL MEDICINE

## 2024-04-16 PROCEDURE — 99239 HOSP IP/OBS DSCHRG MGMT >30: CPT | Performed by: INTERNAL MEDICINE

## 2024-04-16 PROCEDURE — 82565 ASSAY OF CREATININE: CPT | Performed by: INTERNAL MEDICINE

## 2024-04-16 PROCEDURE — 250N000013 HC RX MED GY IP 250 OP 250 PS 637: Performed by: HOSPITALIST

## 2024-04-16 PROCEDURE — 85049 AUTOMATED PLATELET COUNT: CPT | Performed by: INTERNAL MEDICINE

## 2024-04-16 PROCEDURE — 77003 FLUOROGUIDE FOR SPINE INJECT: CPT

## 2024-04-16 PROCEDURE — 999N000040 HC STATISTIC CONSULT NO CHARGE VASC ACCESS

## 2024-04-16 PROCEDURE — 3E0R3GC INTRODUCTION OF OTHER THERAPEUTIC SUBSTANCE INTO SPINAL CANAL, PERCUTANEOUS APPROACH: ICD-10-PCS | Performed by: RADIOLOGY

## 2024-04-16 PROCEDURE — 255N000002 HC RX 255 OP 636: Performed by: INTERNAL MEDICINE

## 2024-04-16 PROCEDURE — 36589 REMOVAL TUNNELED CV CATH: CPT

## 2024-04-16 RX ORDER — ACETAMINOPHEN 500 MG
1000 TABLET ORAL EVERY 6 HOURS PRN
Qty: 60 TABLET | Refills: 0 | Status: SHIPPED | OUTPATIENT
Start: 2024-04-16

## 2024-04-16 RX ORDER — CLONAZEPAM 0.5 MG/1
0.5 TABLET ORAL DAILY PRN
Qty: 7 TABLET | Refills: 0 | Status: SHIPPED | OUTPATIENT
Start: 2024-04-16

## 2024-04-16 RX ORDER — METHYLPREDNISOLONE 4 MG
TABLET, DOSE PACK ORAL
Qty: 21 TABLET | Refills: 0 | Status: SHIPPED | OUTPATIENT
Start: 2024-04-16 | End: 2024-05-02

## 2024-04-16 RX ORDER — IOPAMIDOL 408 MG/ML
10 INJECTION, SOLUTION INTRATHECAL ONCE
Status: COMPLETED | OUTPATIENT
Start: 2024-04-16 | End: 2024-04-16

## 2024-04-16 RX ORDER — LIDOCAINE HYDROCHLORIDE 10 MG/ML
30 INJECTION, SOLUTION EPIDURAL; INFILTRATION; INTRACAUDAL; PERINEURAL ONCE
Status: COMPLETED | OUTPATIENT
Start: 2024-04-16 | End: 2024-04-16

## 2024-04-16 RX ADMIN — FERROUS SULFATE TAB 325 MG (65 MG ELEMENTAL FE) 325 MG: 325 (65 FE) TAB at 08:03

## 2024-04-16 RX ADMIN — PANTOPRAZOLE SODIUM 40 MG: 40 TABLET, DELAYED RELEASE ORAL at 08:03

## 2024-04-16 RX ADMIN — POLYETHYLENE GLYCOL 3350 17 G: 17 POWDER, FOR SOLUTION ORAL at 11:10

## 2024-04-16 RX ADMIN — IOPAMIDOL 2 ML: 408 INJECTION, SOLUTION INTRATHECAL at 13:35

## 2024-04-16 RX ADMIN — LORAZEPAM 0.5 MG: 0.5 TABLET ORAL at 12:27

## 2024-04-16 RX ADMIN — HYDROMORPHONE HYDROCHLORIDE 2 MG: 2 TABLET ORAL at 11:18

## 2024-04-16 RX ADMIN — Medication 50 MCG: at 08:03

## 2024-04-16 RX ADMIN — PREGABALIN 100 MG: 100 CAPSULE ORAL at 08:03

## 2024-04-16 RX ADMIN — NORETHINDRONE ACETATE 5 MG: 5 TABLET ORAL at 08:03

## 2024-04-16 RX ADMIN — LIDOCAINE HYDROCHLORIDE 3 ML: 10 INJECTION, SOLUTION EPIDURAL; INFILTRATION; INTRACAUDAL; PERINEURAL at 13:26

## 2024-04-16 RX ADMIN — PSYLLIUM HUSK 1 PACKET: 3.4 POWDER ORAL at 08:03

## 2024-04-16 RX ADMIN — ACETAMINOPHEN 975 MG: 325 TABLET, FILM COATED ORAL at 11:18

## 2024-04-16 RX ADMIN — SENNOSIDES AND DOCUSATE SODIUM 1 TABLET: 50; 8.6 TABLET ORAL at 11:10

## 2024-04-16 RX ADMIN — HYDROMORPHONE HYDROCHLORIDE 3 MG: 2 TABLET ORAL at 03:47

## 2024-04-16 RX ADMIN — HYDROMORPHONE HYDROCHLORIDE 3 MG: 2 TABLET ORAL at 16:03

## 2024-04-16 ASSESSMENT — ACTIVITIES OF DAILY LIVING (ADL)
ADLS_ACUITY_SCORE: 38
ADLS_ACUITY_SCORE: 36
ADLS_ACUITY_SCORE: 38
ADLS_ACUITY_SCORE: 36
ADLS_ACUITY_SCORE: 38
ADLS_ACUITY_SCORE: 38
ADLS_ACUITY_SCORE: 37
ADLS_ACUITY_SCORE: 38
ADLS_ACUITY_SCORE: 37

## 2024-04-16 NOTE — PROCEDURES
[facility]    Procedure: Fluoroscopic-Guided Lumbar Blood Patch    Date/Time: 4/16/2024 1:52 PM    Performed by: Efrem Bernal PA-C  Authorized by: Efrem Bernal PA-C      UNIVERSAL PROTOCOL   Site Marked: Yes  Prior Images Obtained and Reviewed:  Yes  Required items: Required blood products, implants, devices and special equipment available    Patient identity confirmed:  Verbally with patient  NA - No sedation, light sedation, or local anesthesia  Confirmation Checklist:  Patient's identity using two indicators, relevant allergies, procedure was appropriate and matched the consent or emergent situation and correct equipment/implants were available  Time out: Immediately prior to the procedure a time out was called    Universal Protocol: the Joint Commission Universal Protocol was followed    Preparation: Patient was prepped and draped in usual sterile fashion    ESBL (mL):  0     ANESTHESIA    Anesthesia:  Local infiltration  Local Anesthetic:  Lidocaine 1% without epinephrine  Anesthetic Total (mL):  3      SEDATION    Patient Sedated: No    See dictated procedure note for full details.    PROCEDURE  Describe Procedure: Fluoroscopic-Guided Lumbar Blood Patch  Patient Tolerance:  Patient tolerated the procedure well with no immediate complications  Length of time physician/provider present for 1:1 monitoring during sedation: 0

## 2024-04-16 NOTE — PROGRESS NOTES
Care Management Discharge Note    Discharge Date: 04/16/2024       Discharge Disposition: Group Home, Home Care    Discharge Services:      Discharge DME:      Discharge Transportation: family or friend will provide    Private pay costs discussed: Not applicable    Does the patient's insurance plan have a 3 day qualifying hospital stay waiver?  No    PAS Confirmation Code:    Patient/family educated on Medicare website which has current facility and service quality ratings:      Education Provided on the Discharge Plan:    Persons Notified of Discharge Plans: bedside nurse, patient  Patient/Family in Agreement with the Plan:      Handoff Referral Completed: Yes    Additional Information:  Patient discharging this afternoon.    Has stretcher ride set up for [id number] window.    Her PCP clinic will call for appointment scheduling.    She has a Neurology appointment made and on the AVS.    She has a Nephrology referral on the AVS and she will receive a call to schedule the appointment.    Orders and MAR were faxed to the group home.    [facility] has already reached out to patient to schedule first visit for tomorrow.  Faxed orders to them at this time.    Her cadi worker will be working on providing a step stool; she requested the height and weight as well as the discharge summary.  The Ht and Wt were emailed to her and the discharge orders as the summary was not available.    PT is supplying a walker at discharge.    See previous note for other particulars if needed.      Sammi Mcnair RN  Inpatient Float Care Coordinator  [facility]

## 2024-04-16 NOTE — PROGRESS NOTES
Care Management Follow Up    Length of Stay (days): 15    Expected Discharge Date: 04/16/2024     Concerns to be Addressed:       Patient plan of care discussed at interdisciplinary rounds: Yes    Anticipated Discharge Disposition: Group Home, Home Care     Anticipated Discharge Services:    Anticipated Discharge DME:      Patient/family educated on Medicare website which has current facility and service quality ratings:    Education Provided on the Discharge Plan:    Patient/Family in Agreement with the Plan:      Referrals Placed by CM/SW: Homecare  Private pay costs discussed: Not applicable    Additional Information:  Patient likely discharging today.    Needs to have her tunnel cath removed in IR today and also a blood patch done by Xray  .  Message received on  cell phone from her guardians supervisor Marianna Trujillo who is covering for her guardian who is unavailable until tomorrow.  Marianna's number is [phone].   Number was relayed to the IR department and Main Xray.   - addendum at 1040: reached Marianna; stated she already gave phone consent for the procedures    Patient received call from [facility]; they will see her tomorrow and call her today with timing    Patient expresses difficulty in obtaining rides to appointments saying staff is not always available.  She would prefer a virtual PCP appointment if possible.  Hospitalist states a virtual visit would be appropriate.    She would like to follow [facility]; left message with  at 10:10 am.     - this appointment is scheduled for Friday April 26 9:15 am with Dr. Giang at the [facility] clinic.  This was added to the AVS.    Nephrology referral is on the AVS.    Addendum 1040:  reached Ami (guardian) and updated her on procedures today and discharge later.  Also patient would like [facility] wheelchair transport to be set up for transportation as she does not want to ride with the group home staff who is on this afternoon;  she does require assistance for transfers.  Message left with Cherry at  [phone].  -Cherry is ready to receive patient back.. She is not available after 4:30 pm but  staff can be reached at [phone]' otherwise will update Cherry and fax orders to her.    Spoke with PT and they will provide DME for wide walker prior to discharge.    [facility] Transport ride set up by stretcher for this afternoon with window of [id number] pm - changed due to delay in procedure.  If she is available earlier we can call [facility] transport at [phone] for possible earlier time.  Stretcher ride arranged for safety and history of needing supervision during transport.     - Cherry updated with the transport time at 2:05 pm.    Addendum at 1235:  bedside nurse states patient needs a step stool for transfers into her bed at home.  She has one she states is inadequate.  Fortunately, she is currently SBA and staff will be helping her.  Writer emailed her cadi waiver worker with the request they supply a new steady step stool.  I emailed [email or web address].      Will continue to follow for likely discharge today post procedures.      Sammi Mcnair, RN  Inpatient Float Care Coordinator  [facility]

## 2024-04-16 NOTE — PROGRESS NOTES
"Neurology Progress Note.    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 Treated with PLEX given no response to IVIG and steroids in the past.   Vit B12 was very low.   Then patient reported persistent headaches since her spinal tap. Exam unremarkable. Description is concerning for post-LP headaches.   Discussed that she also likely has medication-overuse headache from Fioricet.   Seen right after blood patch. Headache better after IV meds, however, wasn't resolved.   -Fioricet NOT MORE THAN 2 DAYS PER WEEK  -recommend discharge on medrol dose pack taper    Will sign off, please call with questions. Patient to follow up with her regular neurologist after discharge    Thank you for allowing me to participate in cares of this patient. Please contact me with any questions of concerns (pager [phone]).     Subjective: No acute events overnight.  Today patient was seen after blood patch. Reported back pain and making it harder to move her legs as triggers pain.     Objective:  /74 (BP Location: Right arm)   Pulse 92   Temp 98.4  F (36.9  C) (Oral)   Resp 16   Ht 1.575 m (5' 2\")   Wt 119.3 kg (263 lb)   LMP 07/12/2023   SpO2 92%   BMI 48.10 kg/m    General: no acute distress  Neuro:  On focused exam - distal LE strength is same as yesterday. Proximally - patient is able to bend her knees, reported triggering pain with that.     I have personally reviewed all the labs and imaging studies. Pertinent findings:     Component      Latest Ref Rng 4/16/2024  6:49 AM   Creatinine      0.51 - 0.95 mg/dL 0.50 (L)    GFR Estimate      >60 mL/min/1.73m2 >90    Platelet Count      150 - 450 10e3/uL 240     IMPRESSION:   1.  Technically successful blood patch procedure performed at L4-L5.   Legend:  (L) Low    Total time of visit: 35 minutes with the time spent on chart " F/U w/HH referrals.  [facility] cannot take pt, out of network w/insurance.   review, imaging and lab results review, and more than 50 % of the time spent on coordination of cares and face-to-face time with the patient including counseling regarding pathophysiology of the above conditions, results of the tests and further directions of care.     Cally Orlando MD  [facility] Neu[facility]

## 2024-04-16 NOTE — DISCHARGE SUMMARY
"[facility]  Hospitalist Discharge Summary      Date of Admission:  4/1/2024  Date of Discharge:  4/16/2024  Discharging Provider: Shelia Goyal MD  Discharge Service: Hospitalist Service    Discharge Diagnoses   Acute bilateral lower extremity paralysis due to CIDP flareup, s/p Plex, improved.  CIDP chronic inflammatory demyelinating polyneuropathy.  Chronic bilateral lower extremity neuropathy secondary to above.  Spinal headache post LP, improving, s/p blood patch.  Vitamin B12 deficiency.  History of anxiety, depression and bipolar disorder.  History of chronic pain.  History of suspected medication seeking behavior.  Frequent medical noncompliance.  Frequent utilization of healthcare system.  History of self injurious behavior with foreign body ingestion.  Chronic abdominal pain, stable.  Morbid obesity  Obstructive sleep apnea.  Elevated INR, improved    Clinically Significant Risk Factors     # Severe Obesity: Estimated body mass index is 48.1 kg/m  as calculated from the following:    Height as of this encounter: 1.575 m (5' 2\").    Weight as of this encounter: 119.3 kg (263 lb).       Follow-ups Needed After Discharge   Follow-up Appointments     Follow-up and recommended labs and tests       Follow up with primary care provider, Latonya Knight, within 7 days for   hospital follow- up.  No follow up labs or test are needed.   - The clinic care team will be reaching out to you to schedule.  This can   be a virtual visit if preferred.      Follow-up with the Neurology in 2 to 4 weeks as recommended.   - this appointment has been scheduled with [facility]   at the [facility] as requested.  It will be on Friday April 26 at   9:15 am with Dr. Giang.  The address is: [address],   [address]. The phone is [phone]          Unresulted Labs Ordered in the Past 30 Days of this Admission       No orders found from 3/2/2024 to 4/2/2024.      "     Discharge Disposition   Discharged to home  Condition at discharge: Stable    Hospital Course     Acute bilateral lower extremity paralysis dt CIDP flare, s/p PLEX Treatments  CIDP (chronic inflammatory demyelinating polyneuropathy) (H) (4/1/2024):  Chronic bilateral LE neuropathy, sec to above:  Persistent headache since LP:      *Has history of CIDP with peripheral neuropathy.  She underwent treatments with IVIG in the past.  *Initially presented to [facility] ED on 3/30/2024 for evaluation of new onset back pain and bilateral lower extremity weakness (mostly experiences numbness, so weakness was a new symptom for her).  She was unable to ambulate.  *In the ED, labs were generally unremarkable.  General neurology was consulted.  She underwent further evaluation with MRI of the thoracic and lumbar spines.  MRI of the thoracic spine was unremarkable.  MRI lumbar spine showed enlargement of the cauda equina nerve roots and enhancement which was suggestive of CIDP per neurology.  She underwent a lumbar puncture which showed elevated protein and albumin cytologic dissociation which confirmed the diagnosis of CIDP.  She was given 1 g of IV Solu-Medrol and transferred to [facility] to initiate plasma exchange. PICC line was placed.  *Tunneled catheter placed per IR on 4/2 and patient underwent PLEX treatment   *MRI of the cervical spine with and without contrast completed last 04/06 due to new right upper extremity weakness showing no abnormal signal or enhancement within the cervical cord.  DDD changes at C5-C6 levels were noticed.  Shallow chronic central/right paracentral disc bulge that attenuates the anterior aspect of the subarachnoid spaces and causes subtle contrast of the anterior aspect of the cervical cord as seen in the previous MRI in 2021  *MRI brain ordered in the setting of ongoing headache showed no recent infarct, mass or hemorrhage.  Scattered nonspecific nonenhancing T2 signal  hyperintensity in the supratentorial white matter.  Chronic small vessel ischemic changes.  Unchanged compared to prior MRI in 2023.  *Bilateral lower extremity DVT ultrasound done for swelling.  Negative.  *Repeat MRI lumbar spine on 4/13/2024 for persistent low back pain showed no new findings.  Continues to have nerve root enhancement.  *Patient underwent Plex therapy for total of 7 treatments every 48 hours.   *Underwent last 7th treatment on 4/12/2024.     -- Patient was seen and examined on the day of discharge, patient seemed to be slightly anxious about undergoing port removal and blood patch placement  -- Requesting PO Ativan to be administered before the procedure.  -- Patient has received PTA Lyrica 100 mg in a.m. and 200 mg at bedtime which will be continued on discharge.  --During the hospitalization patient did receive Dilaudid it was discussed with patient that no narcotic medication will be continued on discharge, no narcotic prescription is given.  --Neurology initially signed off after completing Plex treatment on 04/14/2024 as patient had significant concerns regarding possible spinal headache, neurology evaluated patient again and patient underwent blood patch placement on 04/16/2024 before discharge.  --Patient will be completing course of Medrol Dosepak after the discharge as recommended by neurology.  --During this stay, patient was recommended to discontinue Fioricet or not take it more than 2 days/week as it might be attributing to her headache.  --Patient is planned to be discharged back to group home with home RN, PT and OT later this evening.  --Patient will be following up with neurology in an outpatient setting, appointment with [facility] is given.     Vitamin B12 deficiency  --Vitamin B12 level less than 150  --One-time dose of 1000 mcg IM given on 4/8/2024.  Follow-up with outpatient PCP for further dosing.  --Patient has elevated homocystine level at 17.1 which  "can be seen with vitamin B12 deficiency.  This should be rechecked in 1 month     Anxiety  Depression  Bipolar disorder  Chronic pain   Hx suspected medication seeking behavior  Frequent medical noncompliance  Frequent utilization of healthcare system  *Complicated psychiatric history.  Patient has a guardian and noted ED treatment plan.  She stated to admitting provider that she may be able to come off her guardianship as \"she is doing really well\" and she was planning to move out of her group home and back to an apartment.  *Psychiatry was consulted while she was at [facility], she was seen by DEC counselor while in the ED.  [place] that she was medically cleared to discharge once medical issues had stabilized.     -- Continue on Lyrica as above and Klonopin as needed.  -- off oral and IV Dilaudid  -- Patient refused lidocaine patch during the stay  -- Leksell available for neck pain.  -- Butalbital with caffeine for headaches which helped with symptom control.  -- Patient requested psychiatry consult to help with anxiety.  Consult was requested, patient was evaluated by psych before discharge, patient felt comfortable not making any changes in medications at this time as she is getting discharged and will follow-up with outpatient psychiatry.     Hx self injurious behavior w/foreign body ingestion  *Hx of 47 endoscopies in last 4 years for foreign body extractions ([place], mickie hair pins, etc) so far during this hospital stay there has no noted self injurious behavior     Chronic abdominal pain  *Noted subacute/chronic issue. Seen at [facility] on 3/29/24 and had normal EGD then left AMA; path report negative for dysplasia, gastritis, or Helicobacter.  *Abx xray 3/31 was nl.   *Chronic and stable on PPI.      Morbid obesity  *Is on Wegovy, reports weight loss of 60 pounds recently. Adamant to receive this medication while in the hospital, was given on 3/31.  *Patient was continued on weekly dose on Sundays while " hospitalized, brought in home supply to use.     ZOHRA  *Brought in home CPAP to use on 4/3.      Mildly elevated INR  -INR at 1.23.  Unclear etiology.  LFTs within normal range  -Could have fatty liver in the setting of obesity    Patient was seen and examined on the day of discharge ,she is feeling well, does not have any complaints , I did review the discharge medications and instructions with the patient and plan for her to follow up with the PCP after the hospitalization .patient was in agreement , she is discharged in stable condition to her Group Home.    Consultations This Hospital Stay   NEUROLOGY IP CONSULT  NEPHROLOGY IP CONSULT  PHYSICAL THERAPY ADULT IP CONSULT  OCCUPATIONAL THERAPY ADULT IP CONSULT  NUTRITION SERVICES ADULT IP CONSULT  INTERVENTIONAL RADIOLOGY ADULT/PEDS IP CONSULT  VASCULAR ACCESS ADULT IP CONSULT  ORTHOSIS EXTREMITY LOWER REFERRAL IP CONSULT  CARE MANAGEMENT / SOCIAL WORK IP CONSULT  CARE MANAGEMENT / SOCIAL WORK IP CONSULT  RESPIRATORY CARE IP CONSULT  VASCULAR ACCESS ADULT IP CONSULT  VASCULAR ACCESS ADULT IP CONSULT  VASCULAR ACCESS ADULT IP CONSULT  PSYCHIATRY IP CONSULT  INTERVENTIONAL RADIOLOGY ADULT/PEDS IP CONSULT  NEUROLOGY IP CONSULT  ANESTHESIOLOGY IP CONSULT  INTERVENTIONAL RADIOLOGY ADULT/PEDS IP CONSULT  INTERVENTIONAL RADIOLOGY ADULT/PEDS IP CONSULT  VASCULAR ACCESS ADULT IP CONSULT    Code Status   Full Code    Time Spent on this Encounter   I, Shelia Goyal MD, personally saw the patient today and spent greater than 30 minutes discharging this patient.       Shelia Goyal MD  [facility] NEUROSCIENCE UNIT  [address] [place] [address]  Phone: [phone]  ______________________________________________________________________    Physical Exam   Vital Signs: Temp: 98.4  F (36.9  C) Temp src: Oral BP: 127/74 Pulse: 92   Resp: 16 SpO2: 92 % O2 Device: None (Room air)    Weight: 263 lbs 0 oz    Physical Exam  Vitals and nursing note reviewed.    Constitutional:       Appearance: She is well-developed.   HENT:      Head: Normocephalic and atraumatic.   Eyes:      Pupils: Pupils are equal, round, and reactive to light.   Neck:      Thyroid: No thyromegaly.   Cardiovascular:      Rate and Rhythm: Normal rate and regular rhythm.      Heart sounds: Normal heart sounds.   Pulmonary:      Effort: Pulmonary effort is normal. No respiratory distress.      Breath sounds: Normal breath sounds.   Abdominal:      General: Bowel sounds are normal. There is no distension.      Palpations: Abdomen is soft.   Musculoskeletal:         General: No tenderness. Normal range of motion.      Cervical back: Normal range of motion and neck supple.   Skin:     General: Skin is warm and dry.   Neurological:      Mental Status: She is alert and oriented to person, place, and time.   Psychiatric:         Behavior: Behavior normal.          Primary Care Physician   Latonya Knight    Discharge Orders      Adult Nephrology  Referral      Home Care Referral      Reason for your hospital stay    You were admitted to the hospital secondary to CIDP flareup.  You were evaluated by neurology and nephrology and you have undergone Plex treatments you were also evaluated by physical and Occupational Therapy.     Follow-up and recommended labs and tests     Follow up with primary care provider, Latonya Knight, within 7 days for hospital follow- up.  No follow up labs or test are needed.   - The clinic care team will be reaching out to you to schedule.  This can be a virtual visit if preferred.      Follow-up with the Neurology in 2 to 4 weeks as recommended.   - this appointment has been scheduled with [facility] at the [facility] as requested.  It will be on Friday April 26 at 9:15 am with Dr. Giang.  The [address] [facility]. The phone is [phone]     Activity    Your activity upon discharge: activity as tolerated and no  driving for today     Discharge Instructions    You were admitted to the hospital secondary to CIDP flareup, you have undergone multiple Plex treatments you are also evaluated by physical and Occupational Therapy and will be discharged with home health care and with home therapies.  Your home medications remain the same you are given Ativan prescription which you have been using as needed for anxiety please try to use it as less as possible you will need further refills from your primary care physician.  You are also discharged on Medrol Dosepak to help with your headache you have also undergone blood patch placement during the hospitalization.  You are recommended to have follow-up with neurology in an outpatient setting.     Walker Order for DME - ONLY FOR DME    Wide, bariatric walker     Diet    Follow this diet upon discharge: Orders Placed This Encounter      Snacks/Supplements Adult: Ensure Max Protein (bariatric); Between Meals      Regular Diet Adult         Significant Results and Procedures   Results for orders placed or performed during the hospital encounter of 04/01/24   IR CVC Tunnel Placement > 5 Yrs of Age    Narrative    Rule RADIOLOGY  LOCATION: [facility]  DATE: 4/2/2024    PROCEDURE: TUNNELED DIALYSIS CATHETER PLACEMENT:    INTERVENTIONAL RADIOLOGIST: Dejon Beauchamp MD    INDICATION: Renal failure    SEDATION: Versed 1.5 mg. Fentanyl 50 mcg. The procedure was performed  with administration intravenous conscious sedation with appropriate  preoperative, intraoperative, and postoperative evaluation. During the  time-out, immediately prior to administration of medications, the  patient was reassessed for adequacy to receive conscious sedation.  9 minutes of supervised face to face conscious sedation time was  provided by a radiology nurse under my direct supervision.    Air Kerma: 5.5 mGy  FLUOROSCOPIC TIME: 0.4 minutes   CONTRAST: None.    COMPLICATIONS: No immediate  complications.    STERILE BARRIER TECHNIQUE: Maximum sterile barrier technique was used.  Cutaneous antisepsis was performed at the operative site with  application of 2% chlorhexidine and large sterile drape. Prior to the  procedure, the surgeon and assistant performed hand hygiene and wore  hat, mask, sterile gown, and sterile gloves during the entire  procedure.    PROCEDURE:   Ultrasound revealed a patent and compressible right internal jugular  vein and an ultrasound image was obtained and placed in the patient's  permanent medical record. The right chest and neck were prepped and  draped in the usual sterile fashion followed by local anesthesia with  1% Xylocaine. Using real-time ultrasound guidance, the right internal  jugular vein was accessed. A subcutaneous tunnel was created requiring  a second incision. Using this access and under fluoroscopy, a 19 cm  palindrome dialysis catheter was advanced until the tip was at the  cavoatrial junction.    FINDINGS:   Ultrasound shows an anechoic and compressible jugular vein.      At the completion of the study, fluoroscopic image reveals the  tunneled dialysis catheter tip to be located at the cavoatrial  junction.      Impression    IMPRESSION:    Successful tunneled dialysis catheter placement.    SHILA BAR MD         SYSTEM ID:  [id number]   MR Cervical Spine w/o Contrast    Narrative    EXAM: MR CERVICAL SPINE W/O CONTRAST  LOCATION: [facility]  DATE: 04/05/2024    INDICATION: Patient with a history for CIDP who is now developing right upper extremity weakness.  COMPARISON: CT cervical spine 06/27/2023.  TECHNIQUE: Exam consists of sagittal T1 and motion-degraded sagittal T2, sagittal STIR and axial T1-weighted images of the brain. Patient was unable to tolerate additional imaging and the exam was terminated.    FINDINGS:   Lateral alignment is normal. There is straightening of the normal cervical lordosis. Craniocervical  junction is intact. Anterior C1-C2 articulation is unremarkable. Mild chronic appearing height loss is seen posteriorly at C6. Cervical vertebral body   heights are otherwise grossly preserved.    Intervertebral disc space heights are preserved. There appears to be a shallow posterior disc herniation at the C5-C6 level which contributes to at least mild spinal canal stenosis. No other disc herniation identified. Facets are not well seen. Foramina   cannot be accurately assessed on the basis of this exam. No clear left foraminal stenosis. Apparent mild right-sided foraminal stenosis C5-C6.    Spinal cord is not well-seen secondary to motion. Apparent slight flattening of the cord at C5-C6 without convincing signal abnormality. Cervical and visualized thoracic cord otherwise demonstrate normal morphology. Allowing for body habitus/obesity,   paravertebral soft tissues are unremarkable. No prevertebral soft tissue swelling.      Impression    IMPRESSION:  1.  Exam is significantly degraded by motion artifact.    2.  Although not well visualized, disc herniation at C5-C6 appears to contact and slightly flatten the ventral cord without definite associated cord signal abnormality. There is mild associated canal narrowing and apparent right foraminal stenosis.     MR Cervical Spine w/o & w Contrast    Narrative    EXAM: MR CERVICAL SPINE W/O and W CONTRAST  LOCATION: [facility]  DATE: 4/6/2024    INDICATION: Progressive right upper extremity weakness, with a history for CIDP and lower extremity weakness  COMPARISON: 04/05/2024. 02/24/2021.  CONTRAST: 10mL Gadavist  TECHNIQUE: MRI Cervical Spine without and with IV contrast.    FINDINGS:   No abnormal signal or enhancement within the cervical cord. The epidural spaces clear. The marrow signal throughout the cervical vertebrae and upper portion of the thoracic vertebral bodies is normal.    Paraspinous soft tissues are clear.    Craniovertebral  junction and C1-C2: Normal.    C2-C3: Normal disc height. No herniation. Normal facets. No spinal canal or neural foraminal stenosis.     C3-C4: Normal disc height. No herniation. Normal facets. No spinal canal or neural foraminal stenosis.     C4-C5: Normal disc height. No herniation. Normal facets. No spinal canal or neural foraminal stenosis.     C5-C6: Slight loss of disc height. Shallow chronic central/right paracentral disc bulge which attenuates the anterior/right aspect of the subarachnoid space and causes subtle contact of the anterior/right aspect of the cervical cord as seen on previous   MRI scan of 02/24/2021.     C6-C7: Normal disc height. No herniation. Normal facets. No spinal canal or neural foraminal stenosis.     C7-T1: Normal disc height. No herniation. Normal facets. No spinal canal or neural foraminal stenosis.      Impression    IMPRESSION:  1.  No abnormal signal or enhancement within the cervical cord.    2.  DDD change at C5-C6 with loss of disc height. Shallow chronic central/right paracentral disc bulge at C5-C6 results in subtle attenuation of the anterior/right aspect of the subarachnoid space and causes subtle contact of the anterior/right aspect of   the cervical cord as seen on previous MRI scan performed 02/24/2021.     MR Brain w/o & w Contrast    Narrative    EXAM: MR BRAIN W/O and W CONTRAST  LOCATION: [facility]  DATE: 4/8/2024    INDICATION: New postural headache; headache; neurologic deficit, nontraumatic; weakness. Neurologic deficit onset >24 hours. No known automatically detected potential contraindications to imaging.  COMPARISON: Head CT 02/01/2024, MRI brain 10/12/2023.  CONTRAST: 11 ML GADAVIST.  TECHNIQUE: Routine multiplanar multisequence head MRI without and with intravenous contrast.    FINDINGS:  INTRACRANIAL CONTENTS: No acute or subacute infarct. No mass, acute hemorrhage, or extra-axial fluid collections. Scattered nonspecific foci of  T2/FLAIR hyperintense signal in the cerebral white matter. Normal ventricles and sulci. Normal position of the   cerebellar tonsils. There is a small developmental venous anomaly in the left frontal lobe.    SELLA: Empty sella morphology with flattening of the pituitary gland along the sellar floor.    OSSEOUS STRUCTURES/SOFT TISSUES: Normal marrow signal. The major intracranial vascular flow voids are maintained.     ORBITS: No abnormality accounting for technique.     SINUSES/MASTOIDS: 3 cm polyp or retention cyst in the right maxillary sinus. No middle ear or mastoid effusion.       Impression    IMPRESSION:  1.  No recent infarct, intracranial mass, pathologic enhancement or evidence of intracranial hemorrhage.  2.  Scattered nonspecific foci of nonenhancing T2 signal hyperintensity in the supratentorial white matter in a random distribution. Differential considerations include foci of nonspecific gliosis or chronic myelin loss, sequelae of chronic headaches and   age-advanced chronic small vessel ischemic changes. The appearance is unchanged compared with the brain MRI of 10/12/2023.   US Lower Extremity Venous Duplex Bilateral    Narrative    VENOUS ULTRASOUND BILATERAL LEG(S)  4/8/2024 12:34 PM     HISTORY: swelling. Lower extremity swelling bilaterally    COMPARISON: 10/28/2023    FINDINGS: Examination of the deep veins with graded compression and  color flow Doppler with spectral wave form analysis was performed.  Images show no evidence of thrombus in the bilateral common femoral  vein, femoral vein, popliteal vein or calf veins.      Impression    IMPRESSION: No deep vein thrombosis in either lower extremity.      BILL REEVES DO         SYSTEM ID:  [id number]   MR Lumbar Spine w/o & w Contrast    Narrative    EXAM: MR LUMBAR SPINE W/O and W CONTRAST  LOCATION: [facility]  DATE: 4/13/2024    INDICATION: CIDP, acceleration of back pain after lumbar puncture, new urinary  hesitancy  COMPARISON: MRI lumbar spine 03/30/2024  CONTRAST: 11mL Gadavist  TECHNIQUE: Routine Lumbar Spine MRI without and with IV contrast.    FINDINGS:   In keeping with the previously used nomenclature, there is transitional anatomy at the lumbosacral junction with partial sacralization of L5 on the right. Normal vertebral body heights, alignment and marrow signal. Normal distal spinal cord with conus   medullaris at L2-L3. Thin fatty filum. As before, there is abnormal enlargement of multiple bilateral exiting nerve roots, overall similar in extent compared to the prior study. There is linear enhancement of the distal cauda equina beginning at the L4   level, slightly improved compared to the prior study. No extraspinal abnormality. Unremarkable visualized bony pelvis.    T12-L1: Normal disc height and signal. No herniation. Normal facets. No spinal canal or neural foraminal stenosis.     L1-L2: Unchanged mild loss of disc height and T2-weighted signal in the disc. No facet arthropathy or spinal canal stenosis. No neural foraminal stenosis.    L2-L3: Normal disc height. Unchanged mild loss of T2-weighted signal and disc. No facet arthropathy or spinal canal stenosis. No neural foraminal stenosis.     L3-L4: Unchanged mild loss of disc height and T2-weighted signal in the disc. No facet arthropathy or spinal canal stenosis. No neural foraminal stenosis.    L4-L5: Normal disc height and signal. Unchanged mild bilateral facet arthropathy. No spinal canal stenosis. No neural foraminal stenosis.    L5-S1: Normal disc height and signal. No herniation. Normal facets. No spinal canal or neural foraminal stenosis.      Impression    IMPRESSION:  1.  When compared to 03/30/2024, there is overall unchanged appearance of abnormal enlargement of multiple bilateral exiting nerve roots. There has been slight interval decrease in degree of linear enhancement involving the distal cauda equina beginning   at the L4 level.    2.   No new acute finding.     3.  No significant spinal canal or neural foraminal stenosis.    XR Blood Patch Procedure    Narrative    EXAM: XR BLOOD PATCH PROCEDURE  LOCATION: [facility]  DATE/TIME: 4/16/2024 1:49 PM    INDICATION: severe headache post lumbar puncture    HISTORY: 33 year-old female presenting today for a fluoroscopic-guided  blood patch, as ordered by the referring provider. The patient has a  history of post-dural puncture headache following LP. Additional  history of CIPD, complicated psychiatric history. Patient unable to  provide consent for self, therefore consent for procedure to be  obtained from legal guardian ([employer]). Prior LP performed  at L4-L5 vertebral level.    The patient denies current use of blood thinning medications,  chemotherapy regimen, or known allergy to contrast media.    PROCEDURE: Prior to the procedure, the patient's pain history and  appropriate radiographic reports were reviewed. The procedure and its  risks (including but not limited to infection, bleeding, neural  injury, and reactions to contrast material and medications) were  discussed with the patient. Informed consent provided by phone from  legal guardian as described above.    The patient was placed in a prone position on the procedure table. The   L4-L5 vertebral level was localized under fluoroscopy. Next, the skin  of the patient's low back was prepped and draped in sterile fashion.  The site was marked with a sterile marker. A pre-procedural pause was  performed. A small amount of 1% lidocaine was injected into the local  soft tissues. Under fluoroscopic control, a 22-gauge needle was placed  into the epidural space by left paramedian, interlaminar approach at  L4-L5. Needle tip location was confirmed by nonionic contrast  injection. The initial 10 mL of venous blood removed from the patient  was wasted. This was then followed by an additional 10 mL of venous  blood  removed from the patient and subsequently injected slowly into  the epidural space. A goal of 20 mL total venous blood injection was  preferred, however, the patient wanted to conclude the procedure  following 10 mL of injected venous blood due to left leg  discomfort/cramping. The needle was removed and a dressing was  applied. Fluoroscopic imaging taken post-procedure which showed  appropriate washout of contrast media. The patient tolerated the  procedure well without apparent complication.     ESTIMATED BLOOD LOSS: initial 10 mL venous blood wasted; 10 mL venous  blood injected epidurally for blood patch    DAP: 31.19 uGym2  FLUOROSCOPY TIME: 0.1 minutes  IMAGES OBTAINED: 6    MEDICATIONS: 3 mL 1% Lidocaine local, 2 mL Isovue M-200      Impression    IMPRESSION:   1.  Technically successful blood patch procedure performed at L4-L5.     CHEVY SHAFER PA-C         SYSTEM ID:  [id number]     *Note: Due to a large number of results and/or encounters for the requested time period, some results have not been displayed. A complete set of results can be found in Results Review.       Discharge Medications   Current Discharge Medication List        START taking these medications    Details   methylPREDNISolone (MEDROL DOSEPAK) 4 MG tablet therapy pack Follow Package Directions  Qty: 21 tablet, Refills: 0    Associated Diagnoses: CIDP (chronic inflammatory demyelinating polyneuropathy) (H); Acute midline back pain, unspecified back location; Migraine without aura and without status migrainosus, not intractable           CONTINUE these medications which have CHANGED    Details   acetaminophen (TYLENOL) 500 MG tablet Take 2 tablets (1,000 mg) by mouth every 6 hours as needed for pain or fever  Qty: 60 tablet, Refills: 0    Associated Diagnoses: CIDP (chronic inflammatory demyelinating polyneuropathy) (H); Acute midline back pain, unspecified back location      clonazePAM (KLONOPIN) 0.5 MG tablet Take 1 tablet (0.5 mg) by  mouth daily as needed for anxiety  Qty: 7 tablet, Refills: 0    Associated Diagnoses: CIDP (chronic inflammatory demyelinating polyneuropathy) (H); Acute midline back pain, unspecified back location; Anxiety disorder, unspecified type           CONTINUE these medications which have NOT CHANGED    Details   albuterol (PROAIR HFA/PROVENTIL HFA/VENTOLIN HFA) 108 (90 Base) MCG/ACT inhaler Inhale 2 puffs into the lungs every 6 hours as needed for shortness of breath / dyspnea or wheezing  Qty: 18 g, Refills: 0    Comments: Pharmacy may dispense brand covered by insurance (Proair, or proventil or ventolin or generic albuterol inhaler)      albuterol (PROVENTIL) (2.5 MG/3ML) 0.083% neb solution Take 1 vial (2.5 mg) by nebulization every 6 hours as needed for shortness of breath or wheezing  Qty: 90 mL, Refills: 0      benzonatate (TESSALON) 100 MG capsule Take 1 capsule (100 mg) by mouth 3 times daily as needed for cough  Qty: 12 capsule, Refills: 0      cetirizine (ZYRTEC) 10 MG tablet Take 1 tablet (10 mg) by mouth daily  Qty: 30 tablet, Refills: 0    Associated Diagnoses: Pica in adults; Gastroesophageal reflux disease without esophagitis      Cholecalciferol (D3 HIGH POTENCY) 25 MCG (1000 UT) CAPS Take 50 mcg by mouth daily      cyclobenzaprine (FLEXERIL) 10 MG tablet Take 1 tablet (10 mg) by mouth 3 times daily as needed for muscle spasms  Qty: 20 tablet, Refills: 0      ferrous sulfate (FEROSUL) 325 (65 Fe) MG tablet Take 1 tablet (325 mg) by mouth daily (with breakfast)  Qty: 30 tablet, Refills: 0    Associated Diagnoses: Red blood cell antibody positive      montelukast (SINGULAIR) 10 MG tablet Take 10 mg by mouth every evening      norethindrone (AYGESTIN) 5 MG tablet Take 5 mg by mouth daily      ondansetron (ZOFRAN-ODT) 4 MG ODT tab Take 1 tablet (4 mg) by mouth every 8 hours as needed for nausea  Qty: 15 tablet, Refills: 0    Associated Diagnoses: Gastroesophageal reflux disease without esophagitis       pantoprazole (PROTONIX) 40 MG EC tablet Take 40 mg by mouth daily      polyethylene glycol (MIRALAX) 17 GM/Dose powder Take 17 g by mouth daily as needed for constipation      !! pregabalin (LYRICA) 100 MG capsule Take 100 mg by mouth every morning      !! pregabalin (LYRICA) 100 MG capsule Take 200 mg by mouth at bedtime      PSYLLIUM PO Take 1 tsp by mouth daily      Semaglutide-Weight Management (WEGOVY) 1 MG/0.5ML pen Inject 1 mg Subcutaneous once a week  Qty: 2 mL, Refills: 3    Associated Diagnoses: Class 3 severe obesity with serious comorbidity and body mass index (BMI) of 50.0 to 59.9 in adult, unspecified obesity type (H)      valACYclovir (VALTREX) 1000 mg tablet Take 2,000 mg by mouth 2 times daily as needed Take 2 tablets by mouth two times daily for one day. Use as needed at onset of cold sore.      Respiratory Therapy Supplies (NEBULIZER) BRENDAN Nebulizer device.  Albuterol nebulization every 2 hours as needed for shortness of breath or wheezing.  Qty: 1 each, Refills: 0    Comments: Include tubing and mask for age please.       !! - Potential duplicate medications found. Please discuss with provider.        Allergies   Allergies   Allergen Reactions    Amoxicillin-Pot Clavulanate Other (See Comments), Swelling and Rash     PN: facial swelling, left side. Also had cortisone injection the same day as she started the Augmentin.          Influenza Vaccines Other (See Comments) and Nausea and Vomiting     HUT Reaction: Nausea And Vomiting; HUT Reaction Type: Intolerance; HUT Severity: Low; [facility] Noted: [id number]    Latex Rash           Oseltamivir Hives    Penicillins Anaphylaxis    Vancomycin Itching, Swelling and Rash     Flushed face, very itchy    Hydrocodone Nausea and Vomiting and GI Disturbance     vomiting for days    Blood-Group Specific Substance Other (See Comments)     Patient has an anti-Cw and non-specific antibodies. Blood product orders may be delayed. Draw one red top and two purple top  tubes for all type/screen/crossmatch orders.  Patient has anti-Cw, anti-K ([place]), Warm auto and nonspecific antibodies. Blood products may be delayed. Draw patient 24 hours prior to transfusion. Draw one red top and two purple top tubes for all type and screen orders.    Clavulanic Acid Angioedema    Haemophilus B Polysaccharide Vaccine Nausea and Vomiting    Oxycodone Swelling    Adhesive Tape Rash     Silicone type  Adhesive allergy      Band-Aid Anti-Itch      Other reaction(s): adhesive allergy    Cephalosporins Rash    Fentanyl Itching    Lamotrigine Rash     Possibly associated with Lamictal.     Naltrexone Other (See Comments)     Reaction(s): Vivid dreams.    No Clinical Screening - See Comments Other (See Comments)     History of swallowing sharp metallic objects. She should not be prescribed lancets due to posed risk of swallowing.

## 2024-04-16 NOTE — PROGRESS NOTES
CLINICAL NUTRITION SERVICES - REASSESSMENT NOTE    Recommendations Ordered by Registered Dietitian (RD):   None at this time     Malnutrition: Patient does not meet two of the above criteria necessary for diagnosing malnutrition     EVALUATION OF PROGRESS TOWARD GOALS   Diet:  Regular and Ensure Max    Intake/Tolerance:  Unable to see Pt. Pt was not in room as they they were busy with cares most of afternoon to prepare for discharge today but orders haven't been entered. Last week Pt had no nutrition concerns, confirmed she knows she can ask RN to call an RD if she needs anything, and wanted to keep her supplement order as is.   - Flowsheets show a good to fair appetite and x1-2 intakes/day of %.     ASSESSED NUTRITION NEEDS:  Dosing Weight 66.1 kg (adjusted)  Estimated Energy Needs: 5343-0416 kcals (20-25 Kcal/Kg)  Justification: maintenance  Estimated Protein Needs: 79-99 grams protein (1.2-1.5 g pro/Kg)  Justification: preservation of lean body mass  Estimated Fluid Needs: 1 mL/kcal or per provider pending fluid status    NEW FINDINGS:   General/Plan: discharge this evening      Weight:   04/15/24 0815 119.3 kg (263 lb)   04/13/24 1000 119.5 kg (263 lb 6.4 oz)   04/06/24 1315 114.3 kg (252 lb)       GI:  noted    Skin: Edema trace to mild    Meds: Reviewed    Labs: reviewed      Previous Goals:   PO - >75% of meal trays TID and daily supplement   Evaluation: Partially Met    Previous Nutrition Diagnosis:   No nutrition diagnosis identified at this time  Evaluation: No change    Malnutrition Diagnosis: Patient does not meet two of the above criteria necessary for diagnosing malnutrition    CURRENT NUTRITION DIAGNOSIS  No nutrition diagnosis identified at this time     INTERVENTIONS  Recommendations / Nutrition Prescription  None at this time     Goals  PO - >75% of meal trays and/or supplements TID     MONITORING AND EVALUATION:  Progress towards goals will be monitored and evaluated per protocol and  Practice Guideline    Foster Vazquez RD, LD

## 2024-04-16 NOTE — PLAN OF CARE
No acute changes overnight, IV solumedrol, magnesium and depacon given last evening - pt states she does not think this helped her headache. A&Ox4. Neuros BLE weakness, baseline neuropathy to bilateral feet. VSS on RA. Reg diet, thin liquids. Takes pills whole. Up with Ax1. PRN dilaudid given for pain. Pt scoring yellow on the Aggression Stop Light Tool, sitter in place for patient safety. Morning labs drawn off PICC this AM. Plan for possible discharge back to pt's group home today, pt to have PICC line and CVC removed today.

## 2024-04-16 NOTE — PROGRESS NOTES
Pt tolerated lumbar blood patch well. 10cc blood wasted from PICC and 10cc blood drawn from PICC by writing RN and administered in lumbar space by Mitul Bernal PA-C. Provider's goal was 20cc blood in lumbar space, pt only able to tolerate 10cc blood in lumbar space. Pt transferred to [facility] via cart and transport.     Niru Bruno RN on 4/16/2024 at 2:08 PM

## 2024-04-16 NOTE — PLAN OF CARE
Reason for Admission: CIDP exacerbation    Cognitive/Mentation: A/Ox 4  Neuros/CMS: Intact ex bilateral upper extremities 4/5, bilateral lower extremities 3/5, unable to do heel to shin.  VS: VSS on RA.   GI: Last BM 4/16/24. Continent. Scheduled metamucil, miralax and senna given at pt request.  : Continent.  Pulmonary: LS clear.  Pain: Tylenol and dilaudid for back pain.     Drains/Lines: PICC and CVC removed  Skin: intact except sites for PICC and CVC removal and blood patch site  Activity: SBA with walker/GB.  Diet: Regular with thin liquids. Takes pills whole with water.     Therapies recs: home with home therapy  Discharge: today between 9531-2971    Aggression Stoplight Tool: green, sitter for pt safety    End of shift summary: PICC and CVC removed. Blood patch done. Discharge medication and discharge instructions provided to patient. Pt did not want klonopin and the orthotics. Klonopin sent back to discharge pharmacy via volunteer. Walker sent with pt.

## 2024-04-16 NOTE — PLAN OF CARE
Occupational Therapy Discharge Summary    Reason for therapy discharge:    Discharged to home with home therapy. (Group home)    Progress towards therapy goal(s). See goals on Care Plan in [facility] electronic health record for goal details.  Goals met    Therapy recommendation(s):      Continued therapy is recommended.  Rationale/Recommendations:   .OT Rationale for DC Rec: Pt below baseline, limited by decreased activity tolerance, weakness and pain. Pt making excellent progress w/ IP therapy, appears motivated. Anticipate she will progress to return straight to her group home w/ HH therapies, assist with donning compression socks, SBA for step over tub transfer. Stairs are a barrier (defer to PT, performed on 4/12 and 4/13). Pt asking for ramp to enter platform step and will defer to PT. Will continue to update recommendations as appropriate.

## 2024-04-16 NOTE — TELEPHONE ENCOUNTER
Completed referral findings in referral encounter.  Francie Rockwell RN, BSN, PHN  [facility] Nephrology Nurse Navigator  [phone]

## 2024-04-16 NOTE — CONSULTS
Psychiatry Consultation; Follow up              Reason for Consult, requesting source:    Anxiety    Requesting source: Shelia Goyal    Labs and imaging reviewed. Provider contacted with recommendations.             Interim history:    Psychiatry seeing patient today regarding anxiety. Psychiatry met with patient on 4/1 regarding concerns for conversation disorder.    Nevin Alvarado is a 33 year old who was admitted on 3/30/24 after she developed leg weakness, pain, and difficulty getting out of the bed.  She has a significant past medical and mental health history notable for generalized anxiety, depression, PTSD, borderline personality disorder, self-injurious behavior including foreign body ingestions, HTN, diabetes, PE, and CIDP. 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     Per psychiatry consultation from 4/11 regarding conversion disorder: Presentation could be due to CIDP exacerbation as noted by neurology and additional neurology and medical work-up would be beneficial to further explore this. In discussion and consultation with attending medical provider, treatment for current presentation would indicate PT. Should patient feel as though anxiety and or depressive symptoms become prominent then considering resumption of selective serotonin reuptake inhibitor of increase in frequency of psychotherapy would be indicated, this is also the same treatment that would be utilized for patients presenting with conversion disorder.     Since meeting with psychiatry, patient has been meeting with PT.    Patient declined to meet with me today. Per nursing, patient was experiencing anticipatory anxiety regarding an upcoming LP. Patient has otherwise been cooperative without additional concerns.         Current  "Medications:     Current Facility-Administered Medications   Medication Dose Route Frequency Provider Last Rate Last Admin    cetirizine (zyrTEC) tablet 10 mg  10 mg Oral At Bedtime Brionna Robertson DO   10 mg at 04/15/24 2033    [Held by provider] enoxaparin ANTICOAGULANT (LOVENOX) injection 40 mg  40 mg Subcutaneous Q12H Xena Ramirez MD   40 mg at 04/15/24 2035    ferrous sulfate (FEROSUL) tablet 325 mg  325 mg Oral Daily with breakfast Melida Cedillo MD   325 mg at 04/16/24 0803    montelukast (SINGULAIR) tablet 10 mg  10 mg Oral QPM Melida Cedillo MD   10 mg at 04/15/24 2033    norethindrone (AYGESTIN) tablet 5 mg  5 mg Oral Daily Melida Cedillo MD   5 mg at 04/16/24 0803    pantoprazole (PROTONIX) EC tablet 40 mg  40 mg Oral Daily Melida Cedillo MD   40 mg at 04/16/24 0803    pregabalin (LYRICA) capsule 100 mg  100 mg Oral QAM Melida Cedillo MD   100 mg at 04/16/24 0803    pregabalin (LYRICA) capsule 200 mg  200 mg Oral At Bedtime Brionna Robertson DO   200 mg at 04/15/24 2033    psyllium (METAMUCIL/KONSYL) Packet 1 packet  1 packet Oral Daily Melida Cedillo MD   1 packet at 04/16/24 0803    Semaglutide-Weight Management (WEGOVY) pen SOAJ 1 mg  1 mg Subcutaneous Weekly Brionna Robertson DO   1 mg at 04/14/24 0920    sodium chloride (PF) 0.9% PF flush 10-40 mL  10-40 mL Intracatheter Q7 Days Xena Ramirez MD   10 mL at 04/11/24 1427    vitamin D3 (CHOLECALCIFEROL) tablet 50 mcg  50 mcg Oral Daily Melida Cedillo MD   50 mcg at 04/16/24 0803              MSE:   Per nursing, patient in no acute distress, but declined to meet with psychiatry today.       Vital signs:  Temp: 98.4  F (36.9  C) Temp src: Oral BP: 127/74 Pulse: 92   Resp: 16 SpO2: 92 % O2 Device: None (Room air)   Height: 157.5 cm (5' 2\") Weight: 119.3 kg (263 lb)  Estimated body mass index is 48.1 kg/m  as calculated from the following:    Height as of this encounter: " "1.575 m (5' 2\").    Weight as of this encounter: 119.3 kg (263 lb).    Qtc: 382 on 3/30/2024         DSM-5 Diagnosis:   300.02 (F41.1) Generalized Anxiety Disorder  Borderline Personality Disorder 60.3   R/O conversion disorder  PTSD by hx  Depression by Hx           Assessment:   Psychiatry consulted today regarding anxiety. However, patient declined to meet with me today. Per nursing, patient is experiencing anticipatory anxiety regarding a scheduled LP. It is reasonable to offer Ativan 0.5 mg PRN prior to this procedure. No additional concerns at this time.           Summary of Recommendations:   1) Patient has both Klonopin 0.5 mg daily PRN and Ativan 0.5 mg q4h PRN currently available for anxiety, and can be offered for anxiety when needed.    2) Non-pharmacological interventions include but are not limited to:   Lavender   Warm Blankets and/or weighted blankets   Activities of interest currently or in the past   Calming music   5,4,3,2,1    Grounding exercise: 5 things you see, 4 things you feel, 3 things you hear, 2 things you smell, 1 thing you taste            Page me or re-consult psychiatry as needed.       Charline Leong, PMKATHERINEP, APRN  Consult/Liaison Psychiatry  [facility]   Contact information available via [facility] Paging/Directory.  If I am not available, please call [facility] intake ([phone])           "

## 2024-04-16 NOTE — IR NOTE
Interventional Radiology Intra-procedural Nursing Note    Patient Name: Nevin Alvarado  Medical Record Number: [id number]  Today's Date: April 16, 2024    Procedure: Right Tunneled Cath Removal with Local  Start time: 1509  End time: 1510  Report provided to: Perry MORALES    Note: Patient entered Interventional Radiology Suite number 2 via cart. Patient awake, alert and orientated. Assisted onto procedural table in sitting position. Prepped and draped.  Dr. Baker in room. Time out and procedure started.    Procedure well tolerated by patient without complications. Procedure end with debrief by Dr. Baker.

## 2024-04-17 ENCOUNTER — HOSPITAL ENCOUNTER (EMERGENCY)
Facility: CLINIC | Age: 34
Discharge: HOME | End: 2024-04-18
Attending: EMERGENCY MEDICINE | Admitting: EMERGENCY MEDICINE
Payer: COMMERCIAL

## 2024-04-17 ENCOUNTER — MEDICAL CORRESPONDENCE (OUTPATIENT)
Dept: HEALTH INFORMATION MANAGEMENT | Facility: CLINIC | Age: 34
End: 2024-04-17

## 2024-04-17 DIAGNOSIS — Z51.89 VISIT FOR WOUND CHECK: ICD-10-CM

## 2024-04-17 PROCEDURE — 99283 EMERGENCY DEPT VISIT LOW MDM: CPT

## 2024-04-17 ASSESSMENT — ACTIVITIES OF DAILY LIVING (ADL): ADLS_ACUITY_SCORE: 44

## 2024-04-17 NOTE — PLAN OF CARE
Physical Therapy Discharge Summary    Reason for therapy discharge:    Discharged to home.    Progress towards therapy goal(s). See goals on Care Plan in [facility] electronic health record for goal details.  Goals partially met.  Barriers to achieving goals:   discharge from facility.    Therapy recommendation(s):    Continued therapy is recommended.  Rationale/Recommendations:  Pt has been making excellent progress - is now completing transfers, amb, and stairs negotiation w/SBA No physical assist, no overt loss of balance or knee buckling. Pt is markedly below age-normative values for strength, mobility, gait speed - she would benefit from HHPT to maximize fn outcomes. At this time pt appears to have made grea improvements in strength , endurance and fn mobility such that she can d/c back to prior living situation.      *Discharging physical therapist did not work directly with patient. Therapy recommendation(s) taken from previous treating therapist's treatment note from last treatment session.

## 2024-04-18 VITALS
WEIGHT: 263 LBS | HEIGHT: 62 IN | OXYGEN SATURATION: 97 % | TEMPERATURE: 98.3 F | BODY MASS INDEX: 48.4 KG/M2 | RESPIRATION RATE: 15 BRPM | SYSTOLIC BLOOD PRESSURE: 153 MMHG | HEART RATE: 76 BPM | DIASTOLIC BLOOD PRESSURE: 94 MMHG

## 2024-04-18 NOTE — ED NOTES
Bed: ED13  Expected date: 4/17/24  Expected time: 10:15 PM  Means of arrival: Ambulance  Comments:  [facility] 33F surgical site bleed

## 2024-04-18 NOTE — ED TRIAGE NOTES
Pt BIBA for bleeding at tunnel cath removal site. Pt had tunnel cath for plasma exhange. Pt states that about 1.5 hours ago the site started bleeding, so pt called surgery center and they recommended that she come here. Pt currently experiencing SOB worse than usual and pain in her right neck about tunnel site. Dressing removed upon admission and no active bleeding was noted. VSS.

## 2024-04-18 NOTE — ED PROVIDER NOTES
"  History     Chief Complaint:  Post-op Problem       The history is provided by the patient.      Nevin Alvarado is a 33 year old female with history of type 2 diabetes mellitus and PE who presents with a post-op problem. She had a right tunneled dialysis catheter placed on 4/2 and removed yesterday upon her discharge from the hospital. She started bleeding from the catheter removal site this evening. She has been having neck pain at the removal site. She called the surgery center and they advised her to come into the ED. She was on Lovenox while at the hospital but does not take blood thinners usually.    Independent Historian:   None - Patient Only    Review of External Notes:   I reviewed her note from 4/2/2024 when she got a tunneled dialysis cathter placed.     Medications:    Albuterol pro-air  Proventil  Tessalon  Zyrtec  Klonopin  Flexeril  Singulair  Aygestin  Zofran  Protonix  Lyrica  Wegovy  Valtrex    Past Medical History:    ADD  Anorexia nervosa with bulimia  Anxiety  Asthma  Borderline personality disorder  Depression  Type 2 diabetes mellitus  Suicidal ideations  PE  Obesity  Neuropathy  PTSD  Sleep apnea  GERD  Gallstones    Past Surgical History:    Bilateral mammoplasty reduction  Ablation endometriosis   Right knee surgery       Physical Exam   Patient Vitals for the past 24 hrs:   BP Temp Pulse Resp SpO2 Height Weight   04/17/24 2231 -- -- -- -- -- 1.575 m (5' 2\") 119.3 kg (263 lb)   04/17/24 2230 (!) 153/94 98.3  F (36.8  C) 76 15 97 % -- --        Physical Exam  General: Sitting up in bed  Eyes:  The pupils are equal and round    Conjunctivae and sclerae are normal  ENT:    Atraumatic face  Neck:  Normal range of motion. No swelling on neck  CV:  Regular rate    Skin warm and well perfused   Resp:  Non labored breathing on room air    No tachypnea    No cough heard  MS:  Normal muscular tone  Skin:  Small incision on right anterior lateral chest wall with no bleeding  Neuro:   Awake, " alert.      Speech is normal and fluent.    Face is symmetric.     Moves all extremities equally  Psych: Normal affect.  Appropriate interactions.      Emergency Department Course     Emergency Department Course & Assessments:    Interventions:  Medications   gelatin absorbable (GELFOAM) sponge 1 each (has no administration in time range)      Independent Interpretation (X-rays, CTs, rhythm strip):  None    Assessments/Consultations/Discussion of Management or Tests:  3183 I examined the patient and obtained history as noted above.       Social Determinants of Health affecting care:   None    Disposition:  The patient was discharged.     Impression & Plan       Medical Decision Making:  Nevin Alvarado is a 33-year-old female who presented to the emergency department with postop problem.  Patient noted bleeding from site of prior tunneled dialysis catheter.  There is no bleeding on my examination.  There is no palpable hematoma or swelling to suggest ongoing bleeding.  She has no respiratory symptoms.  Gelfoam and new dressing applied.  Recommended keeping this on for 2 to 3 days. Has home care nurse that will be coming to see her.    Diagnosis:    ICD-10-CM    1. Visit for wound check  Z51.89          Scribe Disclosure:  I, Dejon Carpenter, am serving as a scribe at 12:04 AM on 4/18/2024 to document services personally performed by Lori Neri MD based on my observations and the provider's statements to me.     4/17/2024   Lori Neri MD Goertz, Maria Kristine, MD  04/18/24 0642

## 2024-04-18 NOTE — DISCHARGE INSTRUCTIONS
Keep dressing on the chest wall for 2-3 days  When have to remove, get it wet if concerned that it is going to stick

## 2024-04-19 ENCOUNTER — HOSPITAL ENCOUNTER (EMERGENCY)
Facility: CLINIC | Age: 34
Discharge: HOME | End: 2024-04-19
Payer: COMMERCIAL

## 2024-04-19 ENCOUNTER — HOSPITAL ENCOUNTER (EMERGENCY)
Facility: CLINIC | Age: 34
Discharge: HOME | End: 2024-04-19
Attending: EMERGENCY MEDICINE | Admitting: EMERGENCY MEDICINE
Payer: COMMERCIAL

## 2024-04-19 ENCOUNTER — MEDICAL CORRESPONDENCE (OUTPATIENT)
Dept: HEALTH INFORMATION MANAGEMENT | Facility: CLINIC | Age: 34
End: 2024-04-19

## 2024-04-19 ENCOUNTER — APPOINTMENT (OUTPATIENT)
Dept: GENERAL RADIOLOGY | Facility: CLINIC | Age: 34
End: 2024-04-19
Attending: EMERGENCY MEDICINE
Payer: COMMERCIAL

## 2024-04-19 VITALS
TEMPERATURE: 97.8 F | SYSTOLIC BLOOD PRESSURE: 134 MMHG | HEART RATE: 62 BPM | RESPIRATION RATE: 11 BRPM | OXYGEN SATURATION: 97 % | WEIGHT: 263 LBS | BODY MASS INDEX: 48.1 KG/M2 | DIASTOLIC BLOOD PRESSURE: 99 MMHG

## 2024-04-19 DIAGNOSIS — J02.9 SORE THROAT: ICD-10-CM

## 2024-04-19 DIAGNOSIS — R11.0 NAUSEA: ICD-10-CM

## 2024-04-19 DIAGNOSIS — R07.89 CHEST DISCOMFORT: ICD-10-CM

## 2024-04-19 DIAGNOSIS — R51.9 ACUTE NONINTRACTABLE HEADACHE, UNSPECIFIED HEADACHE TYPE: ICD-10-CM

## 2024-04-19 LAB
ANION GAP SERPL CALCULATED.3IONS-SCNC: 12 MMOL/L (ref 7–15)
ATRIAL RATE - MUSE: 68 BPM
BASOPHILS # BLD AUTO: 0 10E3/UL (ref 0–0.2)
BASOPHILS NFR BLD AUTO: 0 %
BUN SERPL-MCNC: 11.7 MG/DL (ref 6–20)
CALCIUM SERPL-MCNC: 9.6 MG/DL (ref 8.6–10)
CHLORIDE SERPL-SCNC: 105 MMOL/L (ref 98–107)
CREAT SERPL-MCNC: 0.53 MG/DL (ref 0.51–0.95)
DEPRECATED HCO3 PLAS-SCNC: 24 MMOL/L (ref 22–29)
DIASTOLIC BLOOD PRESSURE - MUSE: NORMAL MMHG
EGFRCR SERPLBLD CKD-EPI 2021: >90 ML/MIN/1.73M2
EOSINOPHIL # BLD AUTO: 0.2 10E3/UL (ref 0–0.7)
EOSINOPHIL NFR BLD AUTO: 2 %
ERYTHROCYTE [DISTWIDTH] IN BLOOD BY AUTOMATED COUNT: 13.6 % (ref 10–15)
FLUAV RNA SPEC QL NAA+PROBE: NEGATIVE
FLUBV RNA RESP QL NAA+PROBE: NEGATIVE
GLUCOSE SERPL-MCNC: 111 MG/DL (ref 70–99)
GROUP A STREP BY PCR: NOT DETECTED
HCT VFR BLD AUTO: 36.5 % (ref 35–47)
HGB BLD-MCNC: 12.2 G/DL (ref 11.7–15.7)
HOLD SPECIMEN: NORMAL
HOLD SPECIMEN: NORMAL
IMM GRANULOCYTES # BLD: 0 10E3/UL
IMM GRANULOCYTES NFR BLD: 1 %
INTERPRETATION ECG - MUSE: NORMAL
LYMPHOCYTES # BLD AUTO: 1.7 10E3/UL (ref 0.8–5.3)
LYMPHOCYTES NFR BLD AUTO: 20 %
MCH RBC QN AUTO: 30.2 PG (ref 26.5–33)
MCHC RBC AUTO-ENTMCNC: 33.4 G/DL (ref 31.5–36.5)
MCV RBC AUTO: 90 FL (ref 78–100)
MONOCYTES # BLD AUTO: 0.9 10E3/UL (ref 0–1.3)
MONOCYTES NFR BLD AUTO: 10 %
NEUTROPHILS # BLD AUTO: 5.5 10E3/UL (ref 1.6–8.3)
NEUTROPHILS NFR BLD AUTO: 67 %
NRBC # BLD AUTO: 0 10E3/UL
NRBC BLD AUTO-RTO: 0 /100
P AXIS - MUSE: 25 DEGREES
PLATELET # BLD AUTO: 265 10E3/UL (ref 150–450)
POTASSIUM SERPL-SCNC: 3.9 MMOL/L (ref 3.4–5.3)
PR INTERVAL - MUSE: 136 MS
QRS DURATION - MUSE: 74 MS
QT - MUSE: 386 MS
QTC - MUSE: 410 MS
R AXIS - MUSE: 61 DEGREES
RBC # BLD AUTO: 4.04 10E6/UL (ref 3.8–5.2)
RSV RNA SPEC NAA+PROBE: NEGATIVE
SARS-COV-2 RNA RESP QL NAA+PROBE: NEGATIVE
SODIUM SERPL-SCNC: 141 MMOL/L (ref 135–145)
SYSTOLIC BLOOD PRESSURE - MUSE: NORMAL MMHG
T AXIS - MUSE: 21 DEGREES
TROPONIN T SERPL HS-MCNC: 13 NG/L
VENTRICULAR RATE- MUSE: 68 BPM
WBC # BLD AUTO: 8.3 10E3/UL (ref 4–11)

## 2024-04-19 PROCEDURE — 93005 ELECTROCARDIOGRAM TRACING: CPT

## 2024-04-19 PROCEDURE — 258N000003 HC RX IP 258 OP 636: Performed by: EMERGENCY MEDICINE

## 2024-04-19 PROCEDURE — 87637 SARSCOV2&INF A&B&RSV AMP PRB: CPT | Performed by: EMERGENCY MEDICINE

## 2024-04-19 PROCEDURE — 96374 THER/PROPH/DIAG INJ IV PUSH: CPT

## 2024-04-19 PROCEDURE — 250N000013 HC RX MED GY IP 250 OP 250 PS 637: Performed by: EMERGENCY MEDICINE

## 2024-04-19 PROCEDURE — 80048 BASIC METABOLIC PNL TOTAL CA: CPT | Performed by: EMERGENCY MEDICINE

## 2024-04-19 PROCEDURE — 71046 X-RAY EXAM CHEST 2 VIEWS: CPT

## 2024-04-19 PROCEDURE — 87651 STREP A DNA AMP PROBE: CPT | Performed by: EMERGENCY MEDICINE

## 2024-04-19 PROCEDURE — 36415 COLL VENOUS BLD VENIPUNCTURE: CPT | Performed by: EMERGENCY MEDICINE

## 2024-04-19 PROCEDURE — 250N000011 HC RX IP 250 OP 636: Mod: JZ | Performed by: EMERGENCY MEDICINE

## 2024-04-19 PROCEDURE — 96361 HYDRATE IV INFUSION ADD-ON: CPT

## 2024-04-19 PROCEDURE — 99285 EMERGENCY DEPT VISIT HI MDM: CPT | Mod: 25

## 2024-04-19 PROCEDURE — 85004 AUTOMATED DIFF WBC COUNT: CPT | Performed by: EMERGENCY MEDICINE

## 2024-04-19 PROCEDURE — 84484 ASSAY OF TROPONIN QUANT: CPT | Performed by: EMERGENCY MEDICINE

## 2024-04-19 RX ORDER — METOCLOPRAMIDE HYDROCHLORIDE 5 MG/ML
10 INJECTION INTRAMUSCULAR; INTRAVENOUS ONCE
Status: COMPLETED | OUTPATIENT
Start: 2024-04-19 | End: 2024-04-19

## 2024-04-19 RX ORDER — ACETAMINOPHEN 500 MG
500 TABLET ORAL ONCE
Status: COMPLETED | OUTPATIENT
Start: 2024-04-19 | End: 2024-04-19

## 2024-04-19 RX ORDER — ACETAMINOPHEN 650 MG/1
650 SUPPOSITORY RECTAL ONCE
Status: DISCONTINUED | OUTPATIENT
Start: 2024-04-19 | End: 2024-04-19

## 2024-04-19 RX ADMIN — SODIUM CHLORIDE 500 ML: 9 INJECTION, SOLUTION INTRAVENOUS at 10:23

## 2024-04-19 RX ADMIN — METOCLOPRAMIDE 10 MG: 5 INJECTION, SOLUTION INTRAMUSCULAR; INTRAVENOUS at 10:23

## 2024-04-19 RX ADMIN — ACETAMINOPHEN 500 MG: 500 TABLET, FILM COATED ORAL at 08:39

## 2024-04-19 ASSESSMENT — COLUMBIA-SUICIDE SEVERITY RATING SCALE - C-SSRS
6. HAVE YOU EVER DONE ANYTHING, STARTED TO DO ANYTHING, OR PREPARED TO DO ANYTHING TO END YOUR LIFE?: NO
2. HAVE YOU ACTUALLY HAD ANY THOUGHTS OF KILLING YOURSELF IN THE PAST MONTH?: NO
1. IN THE PAST MONTH, HAVE YOU WISHED YOU WERE DEAD OR WISHED YOU COULD GO TO SLEEP AND NOT WAKE UP?: NO

## 2024-04-19 ASSESSMENT — ACTIVITIES OF DAILY LIVING (ADL)
ADLS_ACUITY_SCORE: 43

## 2024-04-19 NOTE — ED TRIAGE NOTES
Pt comes in via EMS for c/o SOB and CP. Rating pain 8/10. Just recently hospitalized for neuropathy and asthma.     Triage Assessment (Adult)       Row Name 04/19/24 0800          Triage Assessment    Airway WDL WDL        Respiratory WDL    Respiratory WDL X        Skin Circulation/Temperature WDL    Skin Circulation/Temperature WDL WDL        Cardiac WDL    Cardiac WDL WDL        Peripheral/Neurovascular WDL    Peripheral Neurovascular WDL WDL        Cognitive/Neuro/Behavioral WDL    Cognitive/Neuro/Behavioral WDL WDL

## 2024-04-19 NOTE — ED PROVIDER NOTES
History     Chief Complaint:  Breathing Problem       The history is provided by the patient.      Nevin Alvarado is a 33 year old female with history of pulmonary emboli, recurrent swallowed foreign body, asthma type 2 diabetes mellitus, and tachycardia who presents to the ED for a breathing problem. The patient reports yesterday she began experiencing a sore throat. She states when she went to bed she was experiencing nausea, lightheadedness, and headache. She reports she woke up at 0530 with shortness of breath, burning chest discomfort, dizziness, cough, diaphoresis, feels warm, and pinching back pain. She confirms she has been able to swallow normally, eating normally, and drinking normally. She denies recent abdominal pain, fever, or swallowing a foreign body.      Independent Historian:   None - Patient Only    Review of External Notes:   I reviewed ED care plan.     Medications:    Albuterol inhaler  Albuterol neb solution  Benzonatate  Cetirizine  Clonazepam  Cyclobenzaprine  Ferrous sulfate   Methylprednisolone  Montelukast  Norethindrone  Ondansetron  Pantoprazole  Polyethylene glycol   Pregabalin  Psyllium   Semaglutide  Valayclovir    Past Medical History:    ADD (attention deficit disorder)  Anorexia nervosa with bulimia   Anxiety  Asthma  Borderline personality disorder   Depressive disorder  Eating disorder  Suicide attempt  Morbid obesity  Neuropathy  Obesity  PTSD (post-traumatic stress disorder)  Pulmonary emboli   Rectal foreign body - Recurrent issue, self placed  Self-injurious behavior  Sleep apnea  Suicidal overdose   Swallowed foreign body - Recurrent issue, self placed  Syncope  Foot drop   Migraine without aura  Esophageal perforation  Dysphagia   Esophageal tear, sequela  Carpal tunnel syndrome   Fibroids  Herpes labialis  Pica in adults  Polyneuropathy  Chronic pelvic pain in female  Intentional diphenhydramine overdose    Red blood cell antibody positive  Scoliosis  GERD  (gastroesophageal reflux disease)  Endometriosis   Esophageal stricture   Gallstones   Type 2 diabetes mellitus with other specified complication, without long-term current use of insulin   ZOHRA on CPAP   Porcelain gallbladder   Cholelithiasis   CIDP (chronic inflammatory demyelinating polyneuropathy)   Sensorineural hearing loss (SNHL) of left ear with unrestricted hearing of right ear   Tachycardia   Fentanyl use disorder, moderate   Factitious disorder imposed on self   Impulse control disorder   Odynophagia   Bone contusion of medial tibial plateau with mildly depressed fracture of posterior margin of right knee   Anemia   Intentional diphenhydramine overdose, initial encounter   Dysmenorrhea   Tylenol overdose   Ovarian cyst      Past Surgical History:    Esophagoscopy, gastroscopy, duodenoscopy (egd), combined x63  Ir cvc tunnel placement > 5 yrs of age  Removal, foreign body, rectum   Pr esophagogastroduodenoscopy transoral diagnostic x14  Pr surg diagnostic exam, anorectal   Combined esophagoscopy, gastroscopy, duodenoscopy (egd), replace esophageal stent   Laparotomy exploratory x2  Sigmoidoscopy flexible x3  Exam under anesthesia rectum    Exam under anesthesia anus   Hc remove fecal impaction or fb w anesthesia   Knee surgery (Right)  Laparoscopic ablation endometriosis  Lymph nodes removed from neck; benign   Mammoplasty reduction (Bilateral)  Release carpal tunnel (Bilateral)  Power port placement   Laparoscopic appendectomy with foreign body       Physical Exam   Patient Vitals for the past 24 hrs:   BP Temp Temp src Pulse Resp SpO2 Weight   04/19/24 1130 -- -- -- 62 -- -- --   04/19/24 1004 -- -- -- 64 -- -- --   04/19/24 0904 -- -- -- 65 -- -- --   04/19/24 0811 (!) 134/99 -- -- -- 26 -- --   04/19/24 0804 (!) 134/99 -- -- -- -- 97 % --   04/19/24 0759 (!) 154/91 97.8  F (36.6  C) Temporal 119 22 99 % 119.3 kg (263 lb)        Physical Exam    General: Alert and cooperative with exam. Patient in mild  distress. Normal mentation. Overweight.  Anxious appearance  Head:  Scalp is NC/AT  Eyes:  No scleral icterus, PERRL  ENT:  The external nose and ears are normal. The oropharynx is with mild posterior oropharynx erythema; mucus membranes are moist. Uvula midline, no evidence of deep space infection.  Neck:  Normal range of motion without rigidity.  CV:  Regular rate and rhythm    No pathologic murmur   Resp:  Breath sounds are clear bilaterally    Non-labored, no retractions or accessory muscle use    Mild tachypnea   GI:  Abdomen is soft, no distension, no tenderness. No peritoneal signs  MS:  No lower extremity edema   Skin:  Warm and dry, No rash or lesions noted.  Neuro: Oriented x 3. No gross motor deficits.     Emergency Department Course   ECG  ECG taken at 0724  Normal sinus rhythm   Normal ECG   Rate 68 bpm. VT interval 136 ms. QRS duration 74 ms. QT/QTc 386/410 ms. P-R-T axes 25 61 21.       Imaging:  Chest XR,  PA & LAT   Preliminary Result   IMPRESSION: No acute cardiopulmonary disease.             Laboratory:  Labs Ordered and Resulted from Time of ED Arrival to Time of ED Departure   BASIC METABOLIC PANEL - Abnormal       Result Value    Sodium 141      Potassium 3.9      Chloride 105      Carbon Dioxide (CO2) 24      Anion Gap 12      Urea Nitrogen 11.7      Creatinine 0.53      GFR Estimate >90      Calcium 9.6      Glucose 111 (*)    TROPONIN T, HIGH SENSITIVITY - Normal    Troponin T, High Sensitivity 13     INFLUENZA A/B, RSV, & SARS-COV2 PCR - Normal    Influenza A PCR Negative      Influenza B PCR Negative      RSV PCR Negative      SARS CoV2 PCR Negative     GROUP A STREPTOCOCCUS PCR THROAT SWAB - Normal    Group A strep by PCR Not Detected     CBC WITH PLATELETS AND DIFFERENTIAL    WBC Count 8.3      RBC Count 4.04      Hemoglobin 12.2      Hematocrit 36.5      MCV 90      MCH 30.2      MCHC 33.4      RDW 13.6      Platelet Count 265      % Neutrophils 67      % Lymphocytes 20      % Monocytes  10      % Eosinophils 2      % Basophils 0      % Immature Granulocytes 1      NRBCs per 100 WBC 0      Absolute Neutrophils 5.5      Absolute Lymphocytes 1.7      Absolute Monocytes 0.9      Absolute Eosinophils 0.2      Absolute Basophils 0.0      Absolute Immature Granulocytes 0.0      Absolute NRBCs 0.0        Emergency Department Course & Assessments:    Interventions:  Medications   acetaminophen (TYLENOL) tablet 500 mg (500 mg Oral $Given 4/19/24 0839)   metoclopramide (REGLAN) injection 10 mg (10 mg Intravenous $Given 4/19/24 1023)   sodium chloride 0.9% BOLUS 500 mL (500 mLs Intravenous $New Bag 4/19/24 1023)        Independent Interpretation (X-rays, CTs, rhythm strip):  I reviewed chest X-ray; No infiltrate, effusion, or PTX    Assessments/Consultations/Discussion of Management or Tests:  ED Course as of 04/19/24 1138   Fri Apr 19, 2024   0733 I obtained history and examined the patient as noted above.    1032 I rechecked the patient and explained findings.        Social Determinants of Health affecting care:   None    Disposition:  The patient was discharged.     Impression & Plan       Medical Decision Making:  Patient is a 33-year-old female who presents with multiple complaints including sore throat, headache, nausea, lightheadedness, and shortness of breath/chest discomfort.  Patient has ED care plan.  Medical history and records reviewed.  On evaluation patient is anxious in appearance but otherwise well-appearing and afebrile.  Labs, EKG, and imaging was obtained.  EKG demonstrates normal sinus rhythm without evidence of acute ischemia, infarction, or significant arrhythmia.  Troponin is normal.  Low clinical suspicion for PE, ACS, or dissection.  Influenza/COVID/RSV/strep testing is negative.  Chest x-ray normal.  Remainder of labs without significant findings.  Patient was provided IV fluids, Reglan, and Tylenol with significant improvement on reassessment.  No emergent cause for patient's  symptoms was determined.  Potential viral syndrome.  Recommended continued supportive care and close follow-up with primary care doctor as needed.  Patient discharged home.      Diagnosis:    ICD-10-CM    1. Acute nonintractable headache, unspecified headache type  R51.9       2. Sore throat  J02.9       3. Nausea  R11.0       4. Chest discomfort  R07.89              Scribe Disclosure:  Joycelyn MURRAY, am serving as a scribe at 9:26 AM on 4/19/2024 to document services personally performed by Campbell Salinas DO based on my observations and the provider's statements to me.     4/19/2024   Campbell Salinas Christopher Warren, DO  04/19/24 1840

## 2024-04-19 NOTE — ED NOTES
Bed: ED10  Expected date:   Expected time:   Means of arrival:   Comments:  ealth 33 f chest pain pleuritic sob eta 0701

## 2024-04-21 ENCOUNTER — APPOINTMENT (OUTPATIENT)
Dept: GENERAL RADIOLOGY | Facility: CLINIC | Age: 34
End: 2024-04-21
Attending: STUDENT IN AN ORGANIZED HEALTH CARE EDUCATION/TRAINING PROGRAM
Payer: COMMERCIAL

## 2024-04-21 ENCOUNTER — APPOINTMENT (OUTPATIENT)
Dept: CT IMAGING | Facility: CLINIC | Age: 34
End: 2024-04-21
Attending: STUDENT IN AN ORGANIZED HEALTH CARE EDUCATION/TRAINING PROGRAM
Payer: COMMERCIAL

## 2024-04-21 ENCOUNTER — HOSPITAL ENCOUNTER (EMERGENCY)
Facility: CLINIC | Age: 34
Discharge: HOME | End: 2024-04-22
Attending: STUDENT IN AN ORGANIZED HEALTH CARE EDUCATION/TRAINING PROGRAM
Payer: COMMERCIAL

## 2024-04-21 VITALS
OXYGEN SATURATION: 94 % | DIASTOLIC BLOOD PRESSURE: 100 MMHG | SYSTOLIC BLOOD PRESSURE: 136 MMHG | RESPIRATION RATE: 18 BRPM | TEMPERATURE: 98.5 F | HEART RATE: 96 BPM

## 2024-04-21 DIAGNOSIS — R42 LIGHTHEADEDNESS: ICD-10-CM

## 2024-04-21 DIAGNOSIS — J18.9 PNEUMONIA DUE TO INFECTIOUS ORGANISM, UNSPECIFIED LATERALITY, UNSPECIFIED PART OF LUNG: ICD-10-CM

## 2024-04-21 DIAGNOSIS — I26.99 ACUTE PULMONARY EMBOLISM WITHOUT ACUTE COR PULMONALE, UNSPECIFIED PULMONARY EMBOLISM TYPE (H): ICD-10-CM

## 2024-04-21 DIAGNOSIS — R19.7 DIARRHEA, UNSPECIFIED TYPE: ICD-10-CM

## 2024-04-21 LAB
ALBUMIN SERPL BCG-MCNC: 5.1 G/DL (ref 3.5–5.2)
ALP SERPL-CCNC: 61 U/L (ref 40–150)
ALT SERPL W P-5'-P-CCNC: 68 U/L (ref 0–50)
ANION GAP SERPL CALCULATED.3IONS-SCNC: 13 MMOL/L (ref 7–15)
AST SERPL W P-5'-P-CCNC: 30 U/L (ref 0–45)
BASOPHILS # BLD AUTO: 0.1 10E3/UL (ref 0–0.2)
BASOPHILS NFR BLD AUTO: 1 %
BILIRUB SERPL-MCNC: 0.2 MG/DL
BUN SERPL-MCNC: 9.8 MG/DL (ref 6–20)
CALCIUM SERPL-MCNC: 10.1 MG/DL (ref 8.6–10)
CHLORIDE SERPL-SCNC: 102 MMOL/L (ref 98–107)
CREAT SERPL-MCNC: 0.6 MG/DL (ref 0.51–0.95)
DEPRECATED HCO3 PLAS-SCNC: 26 MMOL/L (ref 22–29)
EGFRCR SERPLBLD CKD-EPI 2021: >90 ML/MIN/1.73M2
EOSINOPHIL # BLD AUTO: 0.3 10E3/UL (ref 0–0.7)
EOSINOPHIL NFR BLD AUTO: 4 %
ERYTHROCYTE [DISTWIDTH] IN BLOOD BY AUTOMATED COUNT: 13.9 % (ref 10–15)
GLUCOSE SERPL-MCNC: 99 MG/DL (ref 70–99)
HCG SERPL QL: NEGATIVE
HCT VFR BLD AUTO: 46.1 % (ref 35–47)
HGB BLD-MCNC: 15.5 G/DL (ref 11.7–15.7)
IMM GRANULOCYTES # BLD: 0 10E3/UL
IMM GRANULOCYTES NFR BLD: 0 %
LACTATE SERPL-SCNC: 1.3 MMOL/L (ref 0.7–2)
LIPASE SERPL-CCNC: 44 U/L (ref 13–60)
LYMPHOCYTES # BLD AUTO: 2 10E3/UL (ref 0.8–5.3)
LYMPHOCYTES NFR BLD AUTO: 27 %
MAGNESIUM SERPL-MCNC: 2 MG/DL (ref 1.7–2.3)
MCH RBC QN AUTO: 30.8 PG (ref 26.5–33)
MCHC RBC AUTO-ENTMCNC: 33.6 G/DL (ref 31.5–36.5)
MCV RBC AUTO: 92 FL (ref 78–100)
MONOCYTES # BLD AUTO: 0.9 10E3/UL (ref 0–1.3)
MONOCYTES NFR BLD AUTO: 12 %
NEUTROPHILS # BLD AUTO: 4.2 10E3/UL (ref 1.6–8.3)
NEUTROPHILS NFR BLD AUTO: 56 %
NRBC # BLD AUTO: 0 10E3/UL
NRBC BLD AUTO-RTO: 0 /100
PLATELET # BLD AUTO: 271 10E3/UL (ref 150–450)
POTASSIUM SERPL-SCNC: 4 MMOL/L (ref 3.4–5.3)
PROCALCITONIN SERPL IA-MCNC: 0.09 NG/ML
PROT SERPL-MCNC: 7.5 G/DL (ref 6.4–8.3)
RADIOLOGIST FLAGS: ABNORMAL
RBC # BLD AUTO: 5.04 10E6/UL (ref 3.8–5.2)
SODIUM SERPL-SCNC: 141 MMOL/L (ref 135–145)
WBC # BLD AUTO: 7.4 10E3/UL (ref 4–11)

## 2024-04-21 PROCEDURE — 81001 URINALYSIS AUTO W/SCOPE: CPT | Performed by: STUDENT IN AN ORGANIZED HEALTH CARE EDUCATION/TRAINING PROGRAM

## 2024-04-21 PROCEDURE — 93005 ELECTROCARDIOGRAM TRACING: CPT

## 2024-04-21 PROCEDURE — 83735 ASSAY OF MAGNESIUM: CPT | Performed by: STUDENT IN AN ORGANIZED HEALTH CARE EDUCATION/TRAINING PROGRAM

## 2024-04-21 PROCEDURE — 74177 CT ABD & PELVIS W/CONTRAST: CPT

## 2024-04-21 PROCEDURE — 250N000013 HC RX MED GY IP 250 OP 250 PS 637: Performed by: STUDENT IN AN ORGANIZED HEALTH CARE EDUCATION/TRAINING PROGRAM

## 2024-04-21 PROCEDURE — 85025 COMPLETE CBC W/AUTO DIFF WBC: CPT | Performed by: STUDENT IN AN ORGANIZED HEALTH CARE EDUCATION/TRAINING PROGRAM

## 2024-04-21 PROCEDURE — 83605 ASSAY OF LACTIC ACID: CPT | Performed by: STUDENT IN AN ORGANIZED HEALTH CARE EDUCATION/TRAINING PROGRAM

## 2024-04-21 PROCEDURE — 83880 ASSAY OF NATRIURETIC PEPTIDE: CPT | Performed by: STUDENT IN AN ORGANIZED HEALTH CARE EDUCATION/TRAINING PROGRAM

## 2024-04-21 PROCEDURE — 71275 CT ANGIOGRAPHY CHEST: CPT

## 2024-04-21 PROCEDURE — 84484 ASSAY OF TROPONIN QUANT: CPT | Performed by: STUDENT IN AN ORGANIZED HEALTH CARE EDUCATION/TRAINING PROGRAM

## 2024-04-21 PROCEDURE — 96361 HYDRATE IV INFUSION ADD-ON: CPT

## 2024-04-21 PROCEDURE — 99285 EMERGENCY DEPT VISIT HI MDM: CPT | Mod: 25

## 2024-04-21 PROCEDURE — 80053 COMPREHEN METABOLIC PANEL: CPT | Performed by: STUDENT IN AN ORGANIZED HEALTH CARE EDUCATION/TRAINING PROGRAM

## 2024-04-21 PROCEDURE — 96375 TX/PRO/DX INJ NEW DRUG ADDON: CPT

## 2024-04-21 PROCEDURE — 71046 X-RAY EXAM CHEST 2 VIEWS: CPT

## 2024-04-21 PROCEDURE — 83690 ASSAY OF LIPASE: CPT | Performed by: STUDENT IN AN ORGANIZED HEALTH CARE EDUCATION/TRAINING PROGRAM

## 2024-04-21 PROCEDURE — 250N000011 HC RX IP 250 OP 636: Performed by: STUDENT IN AN ORGANIZED HEALTH CARE EDUCATION/TRAINING PROGRAM

## 2024-04-21 PROCEDURE — 84145 PROCALCITONIN (PCT): CPT | Performed by: STUDENT IN AN ORGANIZED HEALTH CARE EDUCATION/TRAINING PROGRAM

## 2024-04-21 PROCEDURE — 36415 COLL VENOUS BLD VENIPUNCTURE: CPT | Performed by: STUDENT IN AN ORGANIZED HEALTH CARE EDUCATION/TRAINING PROGRAM

## 2024-04-21 PROCEDURE — 258N000003 HC RX IP 258 OP 636: Performed by: STUDENT IN AN ORGANIZED HEALTH CARE EDUCATION/TRAINING PROGRAM

## 2024-04-21 PROCEDURE — 84703 CHORIONIC GONADOTROPIN ASSAY: CPT | Performed by: STUDENT IN AN ORGANIZED HEALTH CARE EDUCATION/TRAINING PROGRAM

## 2024-04-21 RX ORDER — ACETAMINOPHEN 500 MG
1000 TABLET ORAL ONCE
Status: COMPLETED | OUTPATIENT
Start: 2024-04-21 | End: 2024-04-21

## 2024-04-21 RX ORDER — LEVOFLOXACIN 5 MG/ML
750 INJECTION, SOLUTION INTRAVENOUS ONCE
Status: COMPLETED | OUTPATIENT
Start: 2024-04-21 | End: 2024-04-22

## 2024-04-21 RX ORDER — ONDANSETRON 2 MG/ML
4 INJECTION INTRAMUSCULAR; INTRAVENOUS EVERY 30 MIN PRN
Status: DISCONTINUED | OUTPATIENT
Start: 2024-04-21 | End: 2024-04-22 | Stop reason: HOSPADM

## 2024-04-21 RX ORDER — DIPHENHYDRAMINE HCL 25 MG
25 CAPSULE ORAL ONCE
Status: COMPLETED | OUTPATIENT
Start: 2024-04-21 | End: 2024-04-21

## 2024-04-21 RX ORDER — IOPAMIDOL 755 MG/ML
100 INJECTION, SOLUTION INTRAVASCULAR ONCE
Status: COMPLETED | OUTPATIENT
Start: 2024-04-21 | End: 2024-04-21

## 2024-04-21 RX ADMIN — ONDANSETRON 4 MG: 2 INJECTION INTRAMUSCULAR; INTRAVENOUS at 22:13

## 2024-04-21 RX ADMIN — SODIUM CHLORIDE 1000 ML: 9 INJECTION, SOLUTION INTRAVENOUS at 21:42

## 2024-04-21 RX ADMIN — IOPAMIDOL 100 ML: 755 INJECTION, SOLUTION INTRAVENOUS at 23:02

## 2024-04-21 RX ADMIN — DIPHENHYDRAMINE HYDROCHLORIDE 25 MG: 25 CAPSULE ORAL at 23:57

## 2024-04-21 RX ADMIN — ACETAMINOPHEN 1000 MG: 500 TABLET, FILM COATED ORAL at 23:57

## 2024-04-21 ASSESSMENT — ACTIVITIES OF DAILY LIVING (ADL)
ADLS_ACUITY_SCORE: 43

## 2024-04-22 LAB
ALBUMIN UR-MCNC: NEGATIVE MG/DL
APPEARANCE UR: CLEAR
ATRIAL RATE - MUSE: 90 BPM
BACTERIA #/AREA URNS HPF: ABNORMAL /HPF
BILIRUB UR QL STRIP: NEGATIVE
COLOR UR AUTO: ABNORMAL
DIASTOLIC BLOOD PRESSURE - MUSE: NORMAL MMHG
GLUCOSE UR STRIP-MCNC: NEGATIVE MG/DL
HGB UR QL STRIP: ABNORMAL
INTERPRETATION ECG - MUSE: NORMAL
KETONES UR STRIP-MCNC: NEGATIVE MG/DL
LEUKOCYTE ESTERASE UR QL STRIP: NEGATIVE
MUCOUS THREADS #/AREA URNS LPF: PRESENT /LPF
NITRATE UR QL: NEGATIVE
NT-PROBNP SERPL-MCNC: 57 PG/ML (ref 0–450)
P AXIS - MUSE: 41 DEGREES
PH UR STRIP: 5.5 [PH] (ref 5–7)
PR INTERVAL - MUSE: 146 MS
QRS DURATION - MUSE: 72 MS
QT - MUSE: 354 MS
QTC - MUSE: 433 MS
R AXIS - MUSE: 72 DEGREES
RBC URINE: 2 /HPF
SP GR UR STRIP: >1.05 (ref 1–1.03)
SQUAMOUS EPITHELIAL: 1 /HPF
SYSTOLIC BLOOD PRESSURE - MUSE: NORMAL MMHG
T AXIS - MUSE: 34 DEGREES
TROPONIN T SERPL HS-MCNC: 12 NG/L
UROBILINOGEN UR STRIP-MCNC: NORMAL MG/DL
VENTRICULAR RATE- MUSE: 90 BPM
WBC URINE: 2 /HPF

## 2024-04-22 PROCEDURE — 250N000013 HC RX MED GY IP 250 OP 250 PS 637: Performed by: EMERGENCY MEDICINE

## 2024-04-22 PROCEDURE — 96376 TX/PRO/DX INJ SAME DRUG ADON: CPT

## 2024-04-22 PROCEDURE — 96365 THER/PROPH/DIAG IV INF INIT: CPT

## 2024-04-22 PROCEDURE — 250N000011 HC RX IP 250 OP 636: Performed by: EMERGENCY MEDICINE

## 2024-04-22 PROCEDURE — 250N000011 HC RX IP 250 OP 636: Performed by: STUDENT IN AN ORGANIZED HEALTH CARE EDUCATION/TRAINING PROGRAM

## 2024-04-22 PROCEDURE — 250N000009 HC RX 250: Performed by: EMERGENCY MEDICINE

## 2024-04-22 RX ORDER — APIXABAN 5 MG (74)
KIT ORAL
Qty: 1 EACH | Refills: 0 | Status: SHIPPED | OUTPATIENT
Start: 2024-04-22 | End: 2024-04-22

## 2024-04-22 RX ORDER — LEVOFLOXACIN 750 MG/1
750 TABLET, FILM COATED ORAL DAILY
Qty: 4 TABLET | Refills: 0 | Status: SHIPPED | OUTPATIENT
Start: 2024-04-22 | End: 2024-04-26

## 2024-04-22 RX ORDER — IOPAMIDOL 755 MG/ML
500 INJECTION, SOLUTION INTRAVASCULAR ONCE
Status: COMPLETED | OUTPATIENT
Start: 2024-04-22 | End: 2024-04-22

## 2024-04-22 RX ADMIN — SODIUM CHLORIDE 95 ML: 9 INJECTION, SOLUTION INTRAVENOUS at 00:15

## 2024-04-22 RX ADMIN — ONDANSETRON 4 MG: 2 INJECTION INTRAMUSCULAR; INTRAVENOUS at 03:12

## 2024-04-22 RX ADMIN — RIVAROXABAN 15 MG: 15 TABLET, FILM COATED ORAL at 02:13

## 2024-04-22 RX ADMIN — LEVOFLOXACIN 750 MG: 750 INJECTION, SOLUTION INTRAVENOUS at 00:10

## 2024-04-22 RX ADMIN — IOPAMIDOL 90 ML: 755 INJECTION, SOLUTION INTRAVENOUS at 00:15

## 2024-04-22 ASSESSMENT — ACTIVITIES OF DAILY LIVING (ADL)
ADLS_ACUITY_SCORE: 43

## 2024-04-22 NOTE — ED TRIAGE NOTES
Pt presents via EMS for HA, N/V/D, feeling sweaty, fevers on and off. States symptoms started anywhere from 1-3 days ago. States they are getting worse. States clothes and bedding are always wet no matter how many layers she has on. Diarrhea started yesterday. 6 episodes of diarrhea today. Dizzy and  Intermittent eye blurry eyes today as well.  Pt has dressing to chest and concerned about redness at the site.    Triage Assessment (Adult)       Row Name 04/21/24 1925          Triage Assessment    Airway WDL WDL        Respiratory WDL    Respiratory WDL X;rhythm/pattern     Rhythm/Pattern, Respiratory shortness of breath

## 2024-04-22 NOTE — DISCHARGE INSTRUCTIONS
Discharge Instructions  Adult Diarrhea    You have been seen today for diarrhea (loose stools). This is usually caused by a virus, but some bacteria, parasites, medicines, or other medical conditions can cause similar symptoms. At this time your provider does not find that your diarrhea is a sign of anything dangerous or life-threatening. However, sometimes the signs of serious illness do not show up right away. If you have new or worse symptoms, you may need to be seen again in the Emergency Department or by your primary provider.     Generally, every Emergency Department visit should have a follow-up clinic visit with either a primary or a specialty clinic/provider. Please follow-up as instructed by your emergency provider today.    Return to the Emergency Department if:  You feel you are getting dehydrated, such as being very thirsty, not urinating (peeing) like usual, or feeling faint or lightheaded.   You develop a new fever.  You have abdominal (belly) pain that seems worse than cramps, is in one spot, or is getting worse over time.   You have blood in your stool or your stool becomes black.  (Remember that if you take Pepto-Bismol , this will turn your stool black).   You feel very weak.    What can I do to help myself?  The most important thing to do is to drink clear liquids.   It is best to have only small, frequent sips of liquids. Drinking too much at once may cause more diarrhea. You should also replace minerals, sodium and potassium lost with diarrhea. Pedialyte  and sports drinks can help you replace these minerals. You can also drink clear liquids such as water, weak tea, apple juice, and 7-Up . Avoid acidic liquids (orange juice), caffeine (coffee) or alcohol. Milk products will make the diarrhea worse.  Eat bland (plain) foods. Soda crackers, toast, plain noodles, gelatin, applesauce and bananas are good first choices. Avoid foods that have acid, are spicy, fatty or fibrous (such as meats, coarse  "grains, vegetables). You may start eating these foods again in about 3 days when you are better.   Sometimes treatment includes prescription medicine to prevent diarrhea. If your provider prescribes these for you, take them as directed.   Nonprescription medicine is available for the treatment of diarrhea and can be very effective. If you use it, make sure you use the dose recommended on the package. Check with your healthcare provider before you use any medicine for diarrhea.   Do not take ibuprofen, or other nonsteroidal anti-inflammatory medicines, without checking with your healthcare provider.   Probiotics: If you have been given an antibiotic, you may want to also take a probiotic pill or eat yogurt with live cultures. Probiotics have \"good bacteria\" to help your intestines stay healthy. Studies have shown that probiotics help prevent diarrhea and other intestine problems (including C. diff infection) when you take antibiotics. You can buy these without a prescription in the pharmacy section of the store.   If you were given a prescription for medicine here today, be sure to read all of the information (including the package insert) that comes with your prescription.  This will include important information about the medicine, its side effects, and any warnings that you need to know about.  The pharmacist who fills the prescription can provide more information and answer questions you may have about the medicine.  If you have questions or concerns that the pharmacist cannot address, please call or return to the Emergency Department.  Remember that you can always come back to the Emergency Department if you are not able to see your regular provider in the amount of time listed above, if you get any new symptoms, or if there is anything that worries you.     Discharge Instructions  Dizziness (Lightheaded)  Today you were seen for dizziness.  Dizziness can be caused by many things and it can be very difficult to " determine the cause of dizziness.  At this time, your provider has found no signs that your dizziness is due to a serious or life-threatening condition. However, sometimes there is a serious problem that does not show up right away, and it is important for you to follow up with your regular provider as instructed.  Generally, every Emergency Department visit should have a follow-up clinic visit with either a primary or a specialty clinic/provider. Please follow-up as instructed by your emergency provider today.      Return to the Emergency Department if:    You pass out (fainting or falling out), especially during exercise.    You develop chest pain, chest pressure or difficulty breathing.  Your feel an irregular heartbeat.  You have excessive vaginal bleeding, or blood in your stool or vomit (throw up).  You have a high fever.  Your symptoms get worse or more frequent.    If when you begin to feel dizzy or lightheaded, it is important to sit down or lay down immediately to prevent injury from falling.  If you were given a prescription for medicine here today, be sure to read all of the information (including the package insert) that comes with your prescription.  This will include important information about the medicine, its side effects, and any warnings that you need to know about.  The pharmacist who fills the prescription can provide more information and answer questions you may have about the medicine.  If you have questions or concerns that the pharmacist cannot address, please call or return to the Emergency Department.   Remember that you can always come back to the Emergency Department if you are not able to see your regular provider in the amount of time listed above, if you get any new symptoms, or if there is anything that worries you.

## 2024-04-22 NOTE — ED NOTES
Pt was informed that she will be discharged and she wasn't happy, she stated that the phone at the group home is broken and advised the writer to call her guardian, the VM was left on the guardian's mobile phone. Pt c/o urinalysis result and the blood thinner which was prescribed. Dr. Barrett was called and explained the urine result to the pt and agreed on which blood thinner she would like to take, but patient was not satisfied, and would not want to take AVS until she talks to head doctor.When transport came to take the pt back to her group home, pt refused and stated that she will never leave the hospital and if she is forced to leave pt will bounce back and waste staff's time.

## 2024-04-22 NOTE — ED PROVIDER NOTES
Nevin Alvarado is a 34 y/o female signed out to me by Dr. Yao Anderson for follow-up CT chest with concern for PE noted on abd/pelvis CT without significant cardiopulmonary concerns with plan for likely discharge on anticoagulation.  Please see her note for full details.     CT demonstrates PE. No signs of heart strain on imaging or labs. Low risk PESI score. Patient reassessed and recommended anticoagulation. Risk/benefit considered, more beneficial in this clinical situation. Recommended close follow up with PCP within 1-2 days. Return precautions discussed. Discharged to group home via wheelchair van in stable condition.     Kathy Barrett MD  04/22/24 7103

## 2024-04-22 NOTE — ED PROVIDER NOTES
History     Chief Complaint:  Abdominal Pain    The history is provided by the patient.     Nevin Alvarado is a 33 year old female with history of type 2 diabetes mellitus and CIDP presenting via EMS for evaluation of abdominal pain. Nevin reports abdominal pain, dizziness, diaphoresis, shortness of breath, nausea, and blurred vision with left eye pain. She also notes approximately six episodes of dark green diarrhea daily. She adds that she was recently hospitalized at [facility] for three weeks and is concerned for an infected central line site. She notes that she is on a tapered steroid dose after hospital discharge which is due to finish tomorrow. She reports and abdominal surgery history of cholecystectomy and appendectomy. She denies vomiting, chest pain, or blood in stool. She denies recent antibiotic use. She denies known illness exposures. She denies eating any suspicious foods.    Independent Historian:   None - Patient Only    Review of External Notes:   Recent ED note: Patient seen for very similar symptoms 3 days ago.  Improved after symptomatic care and discharged home.    Medications:    Albuterol  Proventil  Tessalon  Zyrtec  Klonopin  Flexeril  Methylprednisolone  Singulair  Aygestin  Zofran  Protonix  Lyrica  Psyllium  Wegovy  Valtrex  Norlutate  Reglan  Pristiq  Metformin  Buspar  Imitrex  Plaquenil  Rexulti    Past Medical History:    ADD  Anorexia nervosa with bulimia  Anxiety  Asthma  Borderline personality disorder  Depressive disorder  Type 2 diabetes mellitus  PE  Neuropathy  PTSD  Self-injurious behavior  ZOHRA  Swallowed foreign body - recurrent issue, self placed  Suicide attempt  Rectal foreign body - recurrent issue, self placed  CIDP  Migraine headache  GERD  Scoliosis    Past Surgical History:    Abdomen surgery x2  Combined EGD, replace esophageal stent  EGD x63  EGD, dilatation, combined  Exam under anesthesia anus  Exam under anesthesia rectum  HC remove fecal  impaction or FB w anesthesia  IR CVC tunnel placement > 5 yrs of age  IR CVC tunnel removal right  Knee surgery, right  Knee surgery - removed a small tissue mass from knee, right  Laparoscopic ablation endometriosis  Laparotomy exploratory  Lymph nodes removed from neck; benign  Mammoplasty reduction  Foreign body anus removal  KY EGD transoral diagnostic x14  KY surg diagnostic exam, anorectal  Release carpal tunnel, bilateral  Removal foreign body, rectum  Sigmoidoscopy flexible x3   Cholecystectomy    Physical Exam   Patient Vitals for the past 24 hrs:   BP Temp Temp src Pulse Resp SpO2   04/21/24 1928 -- -- -- -- 18 --   04/21/24 1925 (!) 136/100 98.5  F (36.9  C) Oral 96 -- 94 %     Physical Exam  General: Awake, alert. Tearful. Seen walking with a walker  HEENT: Atraumatic   EOM normal   External ears normal   Trachea midline  Neck: Supple, normal ROM  CV: Regular rate, regular rhythm   No murmur   No lower extremity edema  2+ radial and DP pulses  PULM: Breath sounds normal bilaterally  No wheezes or rales  ABD: Soft, non-distended  Normal bowel sounds   No rebound or guarding   Epigastric abdominal tenderness  MSK: No gross deformities  NEURO: Alert, no focal deficits  Skin: Warm, dry and intact    Emergency Department Course   ECG    ECG results from 04/21/24   EKG 12 lead     Value    Systolic Blood Pressure     Diastolic Blood Pressure     Ventricular Rate 90    Atrial Rate 90    KY Interval 146    QRS Duration 72        QTc 433    P Axis 41    R AXIS 72    T Axis 34    Interpretation ECG      Sinus rhythm  Cannot rule out Anterior infarct , age undetermined  Abnormal ECG  When compared with ECG of 19-APR-2024 07:24,  No significant change was found       *Note: Due to a large number of results and/or encounters for the requested time period, some results have not been displayed. A complete set of results can be found in Results Review.       Imaging:  Chest XR,  PA & LAT   Final Result    IMPRESSION: Segmental elevation right hemidiaphragm. Normal heart size. Scoliosis. Lungs clear.      CT Abdomen Pelvis w Contrast   Final Result   Abnormal   IMPRESSION:    1.  Partial visualization of a probable acute pulmonary embolus within a segmental pulmonary artery in the right lower lobe.   2.  A cluster of a few tiny patchy nodular opacities within the left lower lobe, likely infectious or inflammatory in etiology.   3.  No cause of acute pain identified in the abdomen or pelvis.         [Critical Result: New diagnosis of pulmonary embolism]      Finding was identified on 4/21/2024 11:18 PM CDT.       Dr. Samayoa was contacted by me on 4/21/2024 11:19 PM CDT and verbalized understanding of the critical result.        CT Chest Pulmonary Embolism w Contrast    (Results Pending)     Laboratory:  Labs Ordered and Resulted from Time of ED Arrival to Time of ED Departure   COMPREHENSIVE METABOLIC PANEL - Abnormal       Result Value    Sodium 141      Potassium 4.0      Carbon Dioxide (CO2) 26      Anion Gap 13      Urea Nitrogen 9.8      Creatinine 0.60      GFR Estimate >90      Calcium 10.1 (*)     Chloride 102      Glucose 99      Alkaline Phosphatase 61      AST 30      ALT 68 (*)     Protein Total 7.5      Albumin 5.1      Bilirubin Total 0.2     LIPASE - Normal    Lipase 44     MAGNESIUM - Normal    Magnesium 2.0     PROCALCITONIN - Normal    Procalcitonin 0.09     LACTIC ACID WHOLE BLOOD - Normal    Lactic Acid 1.3     HCG QUALITATIVE PREGNANCY - Normal    hCG Serum Qualitative Negative     CBC WITH PLATELETS AND DIFFERENTIAL    WBC Count 7.4      RBC Count 5.04      Hemoglobin 15.5      Hematocrit 46.1      MCV 92      MCH 30.8      MCHC 33.6      RDW 13.9      Platelet Count 271      % Neutrophils 56      % Lymphocytes 27      % Monocytes 12      % Eosinophils 4      % Basophils 1      % Immature Granulocytes 0      NRBCs per 100 WBC 0      Absolute Neutrophils 4.2      Absolute Lymphocytes 2.0       Absolute Monocytes 0.9      Absolute Eosinophils 0.3      Absolute Basophils 0.1      Absolute Immature Granulocytes 0.0      Absolute NRBCs 0.0     ROUTINE UA WITH MICROSCOPIC REFLEX TO CULTURE   TROPONIN T, HIGH SENSITIVITY   NT PROBNP INPATIENT   ENTERIC BACTERIA AND VIRUS PANEL BY PCR   C. DIFFICILE TOXIN B PCR WITH REFLEX TO C. DIFFICILE ANTIGEN AND TOXINS A/B EIA     Procedures    Emergency Department Course & Assessments:    Interventions:  Medications   ondansetron (ZOFRAN) injection 4 mg (4 mg Intravenous $Given 4/21/24 2213)   levofloxacin (LEVAQUIN) infusion 750 mg (has no administration in time range)   acetaminophen (TYLENOL) tablet 1,000 mg (has no administration in time range)   diphenhydrAMINE (BENADRYL) capsule 25 mg (has no administration in time range)   sodium chloride 0.9% BOLUS 1,000 mL (1,000 mLs Intravenous $New Bag 4/21/24 2142)   iopamidol (ISOVUE-370) solution 100 mL (100 mLs Intravenous $Given 4/21/24 2302)   sodium chloride (PF) 0.9% PF flush 65 mL (65 mLs Intravenous $Given 4/21/24 2302)     Assessments:  2038 I obtained history and examined the patient as noted above.    Independent Interpretation (X-rays, CTs, rhythm strip):  I reviewed the patient's chest X-ray and note no pneumothorax.    Consultations/Discussion of Management or Tests:    ED Course as of 04/21/24 2349   Sun Apr 21, 2024   2334 Spoke with radiology.   2347 Re-eval. Updated on plan.        Social Determinants of Health affecting care:   None    Disposition:  The patient was discharged.     Impression & Plan    CMS Diagnoses: None    MIPS: CT for PE was ordered because incidental pulmonary embolism noted in the base of the lungs on abdomen study.      Medical Decision Making:  Vitals WNL on arrival.  Patient presents with gastroenteritis-like illness, lightheadedness and some dyspnea.  Consider broad differential.  Likely has viral syndrome.  Labs are reassuring with a low Pro-Darell, normal white blood cell count.  ALT  is slightly elevated though lipase is WNL.  Patient's feeling much improved and requesting food after Zofran and fluids.  Did order stool cultures though she was unable to produce a sample.  With diarrheal illness lasting only 3 days would not treat with antibiotics at this time and recommend only conservative treatment with mild diet, oral rehydration solutions at home. Patient did not provide urine sample.    Low suspicion for colitis or bowel perforation or foreign bodies though given her history will order CT abdomen pelvis.  Still awaiting urine and stool samples.     CT abdomen pelvis without acute findings in her abdomen or pelvis though did identify incidental lower lobe pulmonary embolism and small pneumonia.  Due to allergy profile will start on Levaquin.  Will send for PE study to evaluate clot burden, add on troponin and BNP to determine right heart strain.  She is not hypoxic nor tachypneic.  Pending clot burden and any radiographic right heart strain might be safe for discharge home with outpatient anticoagulation.  She does have distant history of pulmonary embolism.  Her risk factor would be her prolonged hospitalization recently.    Would discharge on anticoagulation and Levaquin for pneumonia if able to go home.  Signing out to my partner pending PE study and further lab work.    Discussed plan with my partner who will follow-up on add on labs and PE study.  Patient understands plan.  All questions answered.        Diagnosis:    ICD-10-CM    1. Diarrhea, unspecified type  R19.7       2. Lightheadedness  R42              Scribe Disclosure:  I, Trent Nelson, am serving as a scribe at 8:25 PM on 4/21/2024 to document services personally performed by Klaudia Pérez DO based on my observations and the provider's statements to me.   4/21/2024   Klaudia Pérez DO Pappas Richter, Ellen, DO  04/21/24 0040

## 2024-04-24 NOTE — PROGRESS NOTES
"Video-Visit Details    Type of service:  Video Visit    Video Start Time: 7:58 AM  Video End Time: 8:18 AM     Originating Location (pt. Location): Home    Distant Location (provider location): Offsite (providers home) [facility] [place]     Platform [facility] for Video Visit: [facility]    Return Bariatric Nutrition Consultation Note    Reason For Visit: Nutrition Assessment    Nevin Alvarado is a 33 year old presenting today for return bariatric nutrition consult.  Provided the weight loss surgery nutrition class information during this visit.  Pt is interested in laparoscopic sleeve gastrectomy in the future.  Patient is accompanied by self.  This is pt's 5th nutrition visit.    Pt referred by Sharon Toro NP on September 12, 2023.  CO-MORBIDITIES OF OBESITY INCLUDE:        9/12/2023    12:48 PM   --   I have the following health issues associated with obesity Type II Diabetes    High Blood Pressure    Sleep Apnea    Polycystic Ovarian Syndrome    GERD (Reflux)    Fatty Liver    Asthma    Stress Incontinence     SUPPORT:      9/12/2023    12:48 PM   Support System Reviewed With Patient   Who do you have in your support network that can be available to help you for the first 2 weeks after surgery? I live in a group home with 24 hour staff, mom   Who can you count on for support throughout your weight loss surgery journey? a friend, and my therapist     ANTHROPOMETRICS:  Initial Consult Weight: 309 lb on 9/13/23  Estimated body mass index is 44.62 kg/m  as calculated from the following:    Height as of this encounter: 1.575 m (5' 2.01\").    Weight as of this encounter: 110.7 kg (244 lb).  Current Weight: 244 lb per pt report     Wt Readings from Last 5 Encounters:   04/25/24 110.7 kg (244 lb)   04/19/24 119.3 kg (263 lb)   04/17/24 119.3 kg (263 lb)   04/15/24 119.3 kg (263 lb)   03/31/24 114.6 kg (252 lb 10.4 oz)     Required weight loss goal pre-op: -20 lbs from initial consult " weight (goal weight 289 lbs or less before surgery) - met        9/12/2023    12:48 PM   --   I have tried the following methods to lose weight Watching portions or calories    Exercise    Pre packaged meals ex: Nutrisystem    OTC Medications    Prescription Medications         9/12/2023    12:48 PM   Weight Loss Questions Reviewed With Patient   How long have you been overweight? Since late teens through early 20's     MEDICATION FOR WEIGHT LOSS:  Wegovy 1.0 mg - continues on this, feels she is doing fine with this dose.     Hx of self harm- swallowing thing- started after she was treated for anorexia when taking topiramate- in 6 months has only had one day of self harm- this was 2 weeks ago and instead of swallowing she inserted something per rectum. - Followed by MASON Potter PA-C, PE in 2019 after esophageal perforation repair     VITAMIN/MINERAL SUPPLEMENTS:  Iron     NUTRITION HISTORY:  Food allergies:NKFA  Food intolerances: None  Previous experience with diet modification for weight loss: Nutrisystem, prescription medications, exercise, watching calories or portions     Has been meal planning for the past 3 months. Likes chicken, turkey, shrimp.      October 2023: Eating 3 meals per day. Lunch and dinner meals have been chili or mediterranean orzo salad with artichokes more recently. Drinks mostly water, Cup of coffee, [employer]. Doesn't drink soda or juice. Uses a walker for neuropathy. Walking around more often/standing longer which has been an improvement.     November 2023: Reports she has been sick a lot recently. Sometimes feels she has to force herself to eat. Currently has a cold. Working with a GI doctor right now also as she has been having some GI symptoms after eating certain foods. Reports she had another self harm episode unfortunately, feels this will delay her chances for surgery.     January 2024: Had been dealing with illness recently which set her back a little bit on her  goals. Has been using Wegovy. Feels since starting the Wegovy her face gets puffy or red blotches on it, plans to keep a close eye on it before deciding to stop. Had been eating very well but finding more temptations now. Feels she is not cooking as often and doing more frozen meals.  Hasn't been using walker for a few weeks.     April 2024: Since visiting Sharon nutrition has been off and on since she was in the hospital and multiple trips to the ED. Currently being treated for pneumonia, PE, depression and anxiety. Reports she continues to have constipation/diarrhea on and off. Has Miralax as needed. Also has enema prescription also. Nutrition has been very challenging over the last month due to being in the hospital for 15 days. She did continue to do her Wegovy injections while in the hospital. Plan is to focus on getting healthy and feeling better then will work on her good eating habits again.          9/12/2023    12:48 PM   Recall Diet Questions Reviewed With Patient   Describe what you typically consume for breakfast (typical or most recent) eggs and hash browns, homemade waffles, laith pudding with fruit   Describe what you typically consume for lunch (typical or most recent) homemade lunchables, some pasta or chicken   Describe what you typically consume for supper (typical or most recent) low calorie meal prep meals   Describe what you typically consume as snacks (typical or most recent) cheese sticks, fruit jerky, fruits/vegetables   How many ounces of water, or other low calorie drinks, do you drink daily (8 oz=1 glass)? 48 oz   How many ounces of caffeine (coffee, tea, pop) do you drink daily (8 oz=1 glass)? 16 oz   How many ounces of carbonated (pop, beer, sparkling water) drinks do you drinky daily (8 oz=1 glass)? 0 oz   How many ounces of juice, pop, sweet tea, sports drinks, protein drinks, other sweetened drinks, do you drink daily (8 oz=1 glass)? 0 oz   How many ounces of milk do you drink daily  (8 oz=1 glass) 0 oz   How often do you drink alcohol? Never           9/12/2023    12:48 PM   Eating Habits   What foods do you crave? ice cream, chocolate, sometimes just salty chips           9/12/2023    12:48 PM   Dining Out History Reviewed With Patient   How often do you dine out? Rarely.   Where do you dine out? (select all that apply) take out   What types of food do you order when you dine out? jitendra verdin     EXERCISE:        3/28/2024     9:11 AM   --   How often do you exercise? 3 to 4 times per week   What is the duration of your exercise (in minutes)? 15 Minutes   What types of exercise do you do? walking    other   What keeps you from being more active? Pain    I have recently been sick     NUTRITION DIAGNOSIS:  Obesity r/t long history of positive energy balance aeb BMI >30 kg/m2.    INTERVENTION:  Intervention Provided/Education Provided on post-op diet guidelines, vitamins/minerals essential post-operatively, GI anatomy of bariatric surgeries, ways to help prepare for post-op diet guidelines pre-operatively, portion/calorie-control, mindful eating and sources of protein.  Patient demonstrates understanding.     Personal barriers to making and continuing required life changes have been identified, and strategies to overcome those barriers have been recommended AND family and social supports have been assessed and strategies to strengthen those supports have been recommended.    Provided pt with list of goals, RD contact information and resources listed below via [employer].           9/12/2023    12:48 PM   Questions Reviewed With Patient   How ready are you to make changes regarding your weight? Number 1 = Not ready at all to make changes up to 10 = very ready. 10   How confident are you that you can change? 1 = Not confident that you will be successful making changes up to 10 = very confident. 7     Expected Engagement: good    GOALS:  1) Try to eat 3 small meals daily  2) Have a good protein source  at all meals  3) Keep up with hydration, aim for 64 oz of fluid daily.     Follow Up: June 4.     Time spent with patient: 20 minutes.  Nevin Birmingham RD, LD

## 2024-04-25 ENCOUNTER — VIRTUAL VISIT (OUTPATIENT)
Dept: ENDOCRINOLOGY | Facility: CLINIC | Age: 34
End: 2024-04-25
Payer: COMMERCIAL

## 2024-04-25 ENCOUNTER — HOSPITAL ENCOUNTER (EMERGENCY)
Facility: CLINIC | Age: 34
Discharge: HOME | End: 2024-04-25
Attending: EMERGENCY MEDICINE | Admitting: EMERGENCY MEDICINE
Payer: COMMERCIAL

## 2024-04-25 ENCOUNTER — APPOINTMENT (OUTPATIENT)
Dept: CT IMAGING | Facility: CLINIC | Age: 34
End: 2024-04-25
Payer: COMMERCIAL

## 2024-04-25 VITALS — HEIGHT: 62 IN | WEIGHT: 244 LBS | BODY MASS INDEX: 44.9 KG/M2

## 2024-04-25 VITALS
RESPIRATION RATE: 17 BRPM | WEIGHT: 244 LBS | OXYGEN SATURATION: 96 % | HEIGHT: 62 IN | BODY MASS INDEX: 44.9 KG/M2 | SYSTOLIC BLOOD PRESSURE: 127 MMHG | TEMPERATURE: 98 F | DIASTOLIC BLOOD PRESSURE: 71 MMHG | HEART RATE: 98 BPM

## 2024-04-25 DIAGNOSIS — R42 DIZZINESS: ICD-10-CM

## 2024-04-25 DIAGNOSIS — Z71.3 NUTRITIONAL COUNSELING: Primary | ICD-10-CM

## 2024-04-25 DIAGNOSIS — R11.0 NAUSEA: ICD-10-CM

## 2024-04-25 DIAGNOSIS — E66.9 OBESITY: ICD-10-CM

## 2024-04-25 LAB
ANION GAP SERPL CALCULATED.3IONS-SCNC: 10 MMOL/L (ref 7–15)
ATRIAL RATE - MUSE: 89 BPM
BASOPHILS # BLD AUTO: 0 10E3/UL (ref 0–0.2)
BASOPHILS NFR BLD AUTO: 0 %
BUN SERPL-MCNC: 12.9 MG/DL (ref 6–20)
CALCIUM SERPL-MCNC: 9.2 MG/DL (ref 8.6–10)
CHLORIDE SERPL-SCNC: 107 MMOL/L (ref 98–107)
CREAT SERPL-MCNC: 0.54 MG/DL (ref 0.51–0.95)
DEPRECATED HCO3 PLAS-SCNC: 24 MMOL/L (ref 22–29)
DIASTOLIC BLOOD PRESSURE - MUSE: 85 MMHG
EGFRCR SERPLBLD CKD-EPI 2021: >90 ML/MIN/1.73M2
EOSINOPHIL # BLD AUTO: 0.2 10E3/UL (ref 0–0.7)
EOSINOPHIL NFR BLD AUTO: 3 %
ERYTHROCYTE [DISTWIDTH] IN BLOOD BY AUTOMATED COUNT: 13.6 % (ref 10–15)
GLUCOSE SERPL-MCNC: 96 MG/DL (ref 70–99)
HCG SERPL QL: NEGATIVE
HCT VFR BLD AUTO: 39.1 % (ref 35–47)
HGB BLD-MCNC: 13.6 G/DL (ref 11.7–15.7)
IMM GRANULOCYTES # BLD: 0 10E3/UL
IMM GRANULOCYTES NFR BLD: 0 %
INTERPRETATION ECG - MUSE: NORMAL
LYMPHOCYTES # BLD AUTO: 1.8 10E3/UL (ref 0.8–5.3)
LYMPHOCYTES NFR BLD AUTO: 20 %
MAGNESIUM SERPL-MCNC: 2 MG/DL (ref 1.7–2.3)
MCH RBC QN AUTO: 30.3 PG (ref 26.5–33)
MCHC RBC AUTO-ENTMCNC: 34.8 G/DL (ref 31.5–36.5)
MCV RBC AUTO: 87 FL (ref 78–100)
MONOCYTES # BLD AUTO: 0.5 10E3/UL (ref 0–1.3)
MONOCYTES NFR BLD AUTO: 6 %
NEUTROPHILS # BLD AUTO: 6.1 10E3/UL (ref 1.6–8.3)
NEUTROPHILS NFR BLD AUTO: 71 %
NRBC # BLD AUTO: 0 10E3/UL
NRBC BLD AUTO-RTO: 0 /100
P AXIS - MUSE: 43 DEGREES
PLATELET # BLD AUTO: 283 10E3/UL (ref 150–450)
POTASSIUM SERPL-SCNC: 3.7 MMOL/L (ref 3.4–5.3)
PR INTERVAL - MUSE: 150 MS
QRS DURATION - MUSE: 72 MS
QT - MUSE: 354 MS
QTC - MUSE: 430 MS
R AXIS - MUSE: 65 DEGREES
RBC # BLD AUTO: 4.49 10E6/UL (ref 3.8–5.2)
SODIUM SERPL-SCNC: 141 MMOL/L (ref 135–145)
SYSTOLIC BLOOD PRESSURE - MUSE: 144 MMHG
T AXIS - MUSE: 19 DEGREES
VENTRICULAR RATE- MUSE: 89 BPM
WBC # BLD AUTO: 8.7 10E3/UL (ref 4–11)

## 2024-04-25 PROCEDURE — 96375 TX/PRO/DX INJ NEW DRUG ADDON: CPT | Mod: 59

## 2024-04-25 PROCEDURE — 250N000013 HC RX MED GY IP 250 OP 250 PS 637

## 2024-04-25 PROCEDURE — 96361 HYDRATE IV INFUSION ADD-ON: CPT

## 2024-04-25 PROCEDURE — 93005 ELECTROCARDIOGRAM TRACING: CPT

## 2024-04-25 PROCEDURE — 96374 THER/PROPH/DIAG INJ IV PUSH: CPT | Mod: 59

## 2024-04-25 PROCEDURE — 36569 INSJ PICC 5 YR+ W/O IMAGING: CPT

## 2024-04-25 PROCEDURE — 272N000451 HC KIT SHRLOCK 5FR POWER PICC DOUBLE LUMEN

## 2024-04-25 PROCEDURE — 99207 PR NO CHARGE LOS: CPT | Mod: 95

## 2024-04-25 PROCEDURE — 83735 ASSAY OF MAGNESIUM: CPT

## 2024-04-25 PROCEDURE — 250N000011 HC RX IP 250 OP 636: Mod: JZ

## 2024-04-25 PROCEDURE — 250N000013 HC RX MED GY IP 250 OP 250 PS 637: Performed by: EMERGENCY MEDICINE

## 2024-04-25 PROCEDURE — 84703 CHORIONIC GONADOTROPIN ASSAY: CPT

## 2024-04-25 PROCEDURE — 70450 CT HEAD/BRAIN W/O DYE: CPT

## 2024-04-25 PROCEDURE — 99285 EMERGENCY DEPT VISIT HI MDM: CPT | Mod: 25

## 2024-04-25 PROCEDURE — 85004 AUTOMATED DIFF WBC COUNT: CPT

## 2024-04-25 PROCEDURE — 82374 ASSAY BLOOD CARBON DIOXIDE: CPT

## 2024-04-25 PROCEDURE — 36415 COLL VENOUS BLD VENIPUNCTURE: CPT

## 2024-04-25 PROCEDURE — 97803 MED NUTRITION INDIV SUBSEQ: CPT | Mod: 95

## 2024-04-25 PROCEDURE — 250N000009 HC RX 250: Performed by: EMERGENCY MEDICINE

## 2024-04-25 PROCEDURE — 258N000003 HC RX IP 258 OP 636

## 2024-04-25 RX ORDER — DIAZEPAM 5 MG
5 TABLET ORAL EVERY 6 HOURS PRN
Qty: 6 TABLET | Refills: 0 | Status: SHIPPED | OUTPATIENT
Start: 2024-04-25 | End: 2024-05-02

## 2024-04-25 RX ORDER — MECLIZINE HYDROCHLORIDE 25 MG/1
25 TABLET ORAL 3 TIMES DAILY PRN
Qty: 21 TABLET | Refills: 0 | Status: SHIPPED | OUTPATIENT
Start: 2024-04-25 | End: 2024-05-02

## 2024-04-25 RX ORDER — DIPHENHYDRAMINE HYDROCHLORIDE 50 MG/ML
25 INJECTION INTRAMUSCULAR; INTRAVENOUS ONCE
Status: COMPLETED | OUTPATIENT
Start: 2024-04-25 | End: 2024-04-25

## 2024-04-25 RX ORDER — LIDOCAINE 40 MG/G
CREAM TOPICAL
Status: DISCONTINUED | OUTPATIENT
Start: 2024-04-25 | End: 2024-04-25 | Stop reason: HOSPADM

## 2024-04-25 RX ORDER — METOCLOPRAMIDE HYDROCHLORIDE 5 MG/ML
10 INJECTION INTRAMUSCULAR; INTRAVENOUS EVERY 6 HOURS
Status: DISCONTINUED | OUTPATIENT
Start: 2024-04-25 | End: 2024-04-25 | Stop reason: HOSPADM

## 2024-04-25 RX ORDER — MECLIZINE HYDROCHLORIDE 25 MG/1
25 TABLET ORAL ONCE
Status: COMPLETED | OUTPATIENT
Start: 2024-04-25 | End: 2024-04-25

## 2024-04-25 RX ORDER — DIAZEPAM 5 MG
5 TABLET ORAL ONCE
Status: COMPLETED | OUTPATIENT
Start: 2024-04-25 | End: 2024-04-25

## 2024-04-25 RX ADMIN — LIDOCAINE HYDROCHLORIDE 2.5 ML: 10 INJECTION, SOLUTION EPIDURAL; INFILTRATION; INTRACAUDAL; PERINEURAL at 14:30

## 2024-04-25 RX ADMIN — MECLIZINE HYDROCHLORIDE 25 MG: 25 TABLET ORAL at 12:03

## 2024-04-25 RX ADMIN — DIPHENHYDRAMINE HYDROCHLORIDE 25 MG: 50 INJECTION INTRAMUSCULAR; INTRAVENOUS at 15:13

## 2024-04-25 RX ADMIN — DIAZEPAM 5 MG: 5 TABLET ORAL at 15:48

## 2024-04-25 RX ADMIN — SODIUM CHLORIDE 1000 ML: 9 INJECTION, SOLUTION INTRAVENOUS at 15:16

## 2024-04-25 RX ADMIN — METOCLOPRAMIDE HYDROCHLORIDE 10 MG: 5 INJECTION INTRAMUSCULAR; INTRAVENOUS at 15:15

## 2024-04-25 ASSESSMENT — ACTIVITIES OF DAILY LIVING (ADL)
ADLS_ACUITY_SCORE: 43

## 2024-04-25 ASSESSMENT — PAIN SCALES - GENERAL: PAINLEVEL: MODERATE PAIN (4)

## 2024-04-25 NOTE — PROCEDURES
"PICC Line Insertion Procedure Note    Pt. Name:   Nevin Alvarado  MRN:          [id number]    Procedure:     Insertion of a  DUAL Lumen  5 fr  Bard SOLO (valved) Power PICC, Lot number [id number]    Indications: Vascular Access (POOR)    Contraindications : None    Procedure Details:    Patient identified with 2 identifiers and \"Time Out\" conducted.    Central line insertion bundle followed: Hand hygiene performed prior to procedure, site cleansed with Cholraprep (CHG), hat, mask, sterile gloves, sterile gown worn, patient draped with maximum barrier head to toe drape, sterile field maintained.    The right upper arm BASILIC vein was assessed by ultrasound and found to be anechoic, compressible, patent, and of adequate size.     Lidocaine 1% 2.5 ml administered SQ to the insertion site.     Modified Seldinger Technique (MST) used for insertion, ONE attempt(s) required to access vein. Imaging during access shows the needle within the vein.     PICC was advanced through peel-away sheath.    Catheter threaded without difficulty. The tip of the catheter was positioned in the SVC. Good blood return noted.    Catheter was flushed with 20 cc normal saline.     Catheter secured with Statlock, tissue adhesive (on insertion site only), Biopatch (CHG), and Tegaderm dressing applied.    The sharps that are included in the PICC insertion kit were accounted for and disposed of in the sharps container prior to breakdown of the sterile field.    CLABSI prevention brochure left at bedside.    Patient  tolerated procedure well.     Patient's primary RN notified PICC is ready for use.      Findings:    Total catheter length  44 cm, with 0 cm exposed. Mid upper arm circumference is 45 cm.     Tip placement verified in the distal SVC by [employer]:        Comments:  None        Fausto Salinas, MSN, RN, [facility]   Vascular Access - [facility]      "

## 2024-04-25 NOTE — NURSING NOTE
Is the patient currently in the state of [place]? YES    Visit mode:VIDEO    If the visit is dropped, the patient can be reconnected by: VIDEO VISIT: Send to e-mail at: [email or web address]    Will anyone else be joining the visit? NO  (If patient encounters technical issues they should call [phone]    How would you like to obtain your AVS? MyChart    Are changes needed to the allergy or medication list? N/A    Are refills needed on medications prescribed by this physician? NO    Reason for visit: RECHECK ([facility])    Mimi ANTONIO

## 2024-04-25 NOTE — ED PROVIDER NOTES
Emergency Department Midlevel Supervisory Note     I had a face to face encounter with this patient seen by the Advanced Practice Provider (MARYCRUZ). I personally made/approved the management plan and take responsibility for the patient management. I personally saw patient and performed a substantive portion of the visit including all aspects of the medical decision making.     ED Course:  11:39 AM  Niru Rome PA-C  staffed patient with me. I agree with their assessment and plan of management, and I will see the patient.  11:40 AM I met with the patient to introduce myself, gather additional history, perform my initial exam, and discuss the plan.   2:00 PM CT head neg for acute pathology.  Awaiting IV and labs.    3:19 PM IV and labs unable to be obtained any other way, so PICC team ultimately placed a PICC line for this.  If labs all reassuring, will discharge and have PICC line removed prior to discharge.  Awaiting labs.  3:38 PM Plan for discharge home after labs, Rx for meclizine, short Rx for valium for breakthrough dizziness, outpatient follow up advised.    Brief HPI:     Nevin Alvarado is a 33 year old female who presents for evaluation of dizziness and nausea.    The patient presents with 1 hour of sudden onset room-spinning dizziness, nausea, blurry vision, and headache. It did not go away so she called EMS. She received Zofran 8 mg en route with no help. She did not take any medications on her own. She takes warfarin. She was recently diagnosed with pneumonia and PE on 4/22 and finishes her course of Levaquin tomorrow. Of note, she has not been feeling well for the last few days and before yesterday she had not eaten for 5-6 days. She also began her first menstrual period in ~6 years on 4/21. Yesterday she started Lexapro but has taken other depression medications without problems in the past. She has not noticed any excessive bleeding. No known sick contacts. No abnormal food ingestion.     She  "denies fever, chills, chest pain, abdominal pain, or any other complaints at this time.       I, Thomas Christie, am serving as a scribe to document services personally performed by Nish West DO, based on my observations and the provider's statements to me.   I, Nish West DO, attest that Thomas Christie was acting in a scribe capacity, has observed my performance of the services and has documented them in accordance with my direction.    Brief Physical Exam: BP (!) 144/85   Pulse 88   Temp 98  F (36.7  C) (Oral)   Resp 24   Ht 1.575 m (5' 2\")   Wt 110.7 kg (244 lb)   LMP 07/12/2023   SpO2 95%   BMI 44.63 kg/m    Physical Exam  Vitals and nursing note reviewed.   Constitutional:       General: She is not in acute distress.     Appearance: Normal appearance.   HENT:      Head: Normocephalic and atraumatic.      Mouth/Throat:      Mouth: Mucous membranes are moist.   Eyes:      Extraocular Movements: Extraocular movements intact.      Pupils: Pupils are equal, round, and reactive to light.      Comments: No vertical or bidirectional nystagmus   Cardiovascular:      Rate and Rhythm: Normal rate and regular rhythm.   Pulmonary:      Effort: Pulmonary effort is normal. No respiratory distress.      Breath sounds: Normal breath sounds.   Abdominal:      General: There is no distension.      Palpations: Abdomen is soft.      Tenderness: There is no abdominal tenderness.   Musculoskeletal:         General: Normal range of motion.      Cervical back: Normal range of motion.   Skin:     General: Skin is warm.      Capillary Refill: Capillary refill takes less than 2 seconds.   Neurological:      Mental Status: She is alert and oriented to person, place, and time.      Sensory: Sensation is intact.      Motor: Motor function is intact.      Comments: Fluent speech, no facial asymmetry or pronator drift, 5/5 strength b/l UE/LE, normal finger to nose b/l            MDM:  Outside records reviewed from 4/22/24 " showing pt was diagnosed with PE, started on Eliquis.  She is here today with dizziness/nausea and headache for the past 30-45 minutes, neuro intact, no hypoxia or distress.  Given recent anticoagulation start with these symptoms, it warrants CT brain imaging.  Do not feel MRI or angiography or tiered stroke code indicated based on exam/history.  I do suspect some of her symptoms are anxiety related as she's worried about recent PE diagnosis and asks about getting repeat CTA chest today.  She is compliant with Eliquis, however and no hypoxia or tachycardia to warrant repeat CTA chest, which we discussed.  Pt has a history of numerous CT imaging in the past for various complaints, including foreign body ingestions, but unfortunately new anticoagulation in a patient with dizziness/vomiting/headache, warrants further evaluation.  Pt denies ingesting any foreign bodies or intentional self harm.         1. Dizziness    2. Nausea        Consults:  none    Labs and Imaging:  Results for orders placed or performed during the hospital encounter of 04/25/24   CT Head w/o Contrast    Impression    IMPRESSION: No evidence of acute hemorrhage, hydrocephalus or transcortical infarct.     CBC with platelets and differential   Result Value Ref Range    WBC Count 8.7 4.0 - 11.0 10e3/uL    RBC Count 4.49 3.80 - 5.20 10e6/uL    Hemoglobin 13.6 11.7 - 15.7 g/dL    Hematocrit 39.1 35.0 - 47.0 %    MCV 87 78 - 100 fL    MCH 30.3 26.5 - 33.0 pg    MCHC 34.8 31.5 - 36.5 g/dL    RDW 13.6 10.0 - 15.0 %    Platelet Count 283 150 - 450 10e3/uL    % Neutrophils 71 %    % Lymphocytes 20 %    % Monocytes 6 %    % Eosinophils 3 %    % Basophils 0 %    % Immature Granulocytes 0 %    NRBCs per 100 WBC 0 <1 /100    Absolute Neutrophils 6.1 1.6 - 8.3 10e3/uL    Absolute Lymphocytes 1.8 0.8 - 5.3 10e3/uL    Absolute Monocytes 0.5 0.0 - 1.3 10e3/uL    Absolute Eosinophils 0.2 0.0 - 0.7 10e3/uL    Absolute Basophils 0.0 0.0 - 0.2 10e3/uL    Absolute  Immature Granulocytes 0.0 <=0.4 10e3/uL    Absolute NRBCs 0.0 10e3/uL       I have reviewed the relevant laboratory studies above.    I independently interpreted the following imaging study(s):   CT head neg for acute pathology    EKG: I reviewed and independently interpreted the patient's EKG, with comments made as listed below. Please see scanned EKG for full report.   NSR, rate 89, normal intervals, no acute ischemia    Procedures:  I was present for the key portions of procedures documented in MARYCRUZ/midlevel note, see midlevel note for further details.    Nish West DO  [facility] EMERGENCY ROOM  [facility] [facility] [id number] [phone]     Nish West MD  04/25/24 1031

## 2024-04-25 NOTE — LETTER
"4/25/2024       RE: Nevin Alvarado  [address]  [place] [facility] [place] [facility] [facility] [place] [address]     Dear Colleague,    Thank you for referring your patient, Nevin Alvarado, to the [facility] [place] at [facility]. Please see a copy of my visit note below.    Video-Visit Details    Type of service:  Video Visit    Video Start Time: 7:58 AM  Video End Time: 8:18 AM     Originating Location (pt. Location): Home    Distant Location (provider location): Offsite (providers home) [facility] [place]     Platform used for Video Visit: [employer]    Return Bariatric Nutrition Consultation Note    Reason For Visit: Nutrition Assessment    Nevin Alvarado is a 33 year old presenting today for return bariatric nutrition consult.  Provided the weight loss surgery nutrition class information during this visit.  Pt is interested in laparoscopic sleeve gastrectomy in the future.  Patient is accompanied by self.  This is pt's 5th nutrition visit.    Pt referred by Sharon Toro NP on September 12, 2023.  CO-MORBIDITIES OF OBESITY INCLUDE:        9/12/2023    12:48 PM   --   I have the following health issues associated with obesity Type II Diabetes    High Blood Pressure    Sleep Apnea    Polycystic Ovarian Syndrome    GERD (Reflux)    Fatty Liver    Asthma    Stress Incontinence     SUPPORT:      9/12/2023    12:48 PM   Support System Reviewed With Patient   Who do you have in your support network that can be available to help you for the first 2 weeks after surgery? I live in a group home with 24 hour staff, mom   Who can you count on for support throughout your weight loss surgery journey? a friend, and my therapist     ANTHROPOMETRICS:  Initial Consult Weight: 309 lb on 9/13/23  Estimated body mass index is 44.62 kg/m  as calculated from the following:    Height as of this encounter: 1.575 m (5' 2.01\").    Weight " as of this encounter: 110.7 kg (244 lb).  Current Weight: 244 lb per pt report     Wt Readings from Last 5 Encounters:   04/25/24 110.7 kg (244 lb)   04/19/24 119.3 kg (263 lb)   04/17/24 119.3 kg (263 lb)   04/15/24 119.3 kg (263 lb)   03/31/24 114.6 kg (252 lb 10.4 oz)     Required weight loss goal pre-op: -20 lbs from initial consult weight (goal weight 289 lbs or less before surgery) - met        9/12/2023    12:48 PM   --   I have tried the following methods to lose weight Watching portions or calories    Exercise    Pre packaged meals ex: Nutrisystem    OTC Medications    Prescription Medications         9/12/2023    12:48 PM   Weight Loss Questions Reviewed With Patient   How long have you been overweight? Since late teens through early 20's     MEDICATION FOR WEIGHT LOSS:  Wegovy 1.0 mg - continues on this, feels she is doing fine with this dose.     Hx of self harm- swallowing thing- started after she was treated for anorexia when taking topiramate- in 6 months has only had one day of self harm- this was 2 weeks ago and instead of swallowing she inserted something per rectum. - Followed by MASON Potter PA-C, PE in 2019 after esophageal perforation repair     VITAMIN/MINERAL SUPPLEMENTS:  Iron     NUTRITION HISTORY:  Food allergies:NKFA  Food intolerances: None  Previous experience with diet modification for weight loss: Nutrisystem, prescription medications, exercise, watching calories or portions     Has been meal planning for the past 3 months. Likes chicken, turkey, shrimp.      October 2023: Eating 3 meals per day. Lunch and dinner meals have been chili or mediterranean orzo salad with artichokes more recently. Drinks mostly water, Cup of coffee, [employer]. Doesn't drink soda or juice. Uses a walker for neuropathy. Walking around more often/standing longer which has been an improvement.     November 2023: Reports she has been sick a lot recently. Sometimes feels she has to force herself  to eat. Currently has a cold. Working with a GI doctor right now also as she has been having some GI symptoms after eating certain foods. Reports she had another self harm episode unfortunately, feels this will delay her chances for surgery.     January 2024: Had been dealing with illness recently which set her back a little bit on her goals. Has been using Wegovy. Feels since starting the Wegovy her face gets puffy or red blotches on it, plans to keep a close eye on it before deciding to stop. Had been eating very well but finding more temptations now. Feels she is not cooking as often and doing more frozen meals.  Hasn't been using walker for a few weeks.     April 2024: Since visiting Sharon nutrition has been off and on since she was in the hospital and multiple trips to the ED. Currently being treated for pneumonia, PE, depression and anxiety. Reports she continues to have constipation/diarrhea on and off. Has Miralax as needed. Also has enema prescription also. Nutrition has been very challenging over the last month due to being in the hospital for 15 days. She did continue to do her Wegovy injections while in the hospital. Plan is to focus on getting healthy and feeling better then will work on her good eating habits again.          9/12/2023    12:48 PM   Recall Diet Questions Reviewed With Patient   Describe what you typically consume for breakfast (typical or most recent) eggs and hash browns, homemade waffles, laith pudding with fruit   Describe what you typically consume for lunch (typical or most recent) homemade lunchables, some pasta or chicken   Describe what you typically consume for supper (typical or most recent) low calorie meal prep meals   Describe what you typically consume as snacks (typical or most recent) cheese sticks, fruit jerky, fruits/vegetables   How many ounces of water, or other low calorie drinks, do you drink daily (8 oz=1 glass)? 48 oz   How many ounces of caffeine (coffee, tea,  pop) do you drink daily (8 oz=1 glass)? 16 oz   How many ounces of carbonated (pop, beer, sparkling water) drinks do you drinky daily (8 oz=1 glass)? 0 oz   How many ounces of juice, pop, sweet tea, sports drinks, protein drinks, other sweetened drinks, do you drink daily (8 oz=1 glass)? 0 oz   How many ounces of milk do you drink daily (8 oz=1 glass) 0 oz   How often do you drink alcohol? Never           9/12/2023    12:48 PM   Eating Habits   What foods do you crave? ice cream, chocolate, sometimes just salty chips           9/12/2023    12:48 PM   Dining Out History Reviewed With Patient   How often do you dine out? Rarely.   Where do you dine out? (select all that apply) take out   What types of food do you order when you dine out? jitendra verdin     EXERCISE:        3/28/2024     9:11 AM   --   How often do you exercise? 3 to 4 times per week   What is the duration of your exercise (in minutes)? 15 Minutes   What types of exercise do you do? walking    other   What keeps you from being more active? Pain    I have recently been sick     NUTRITION DIAGNOSIS:  Obesity r/t long history of positive energy balance aeb BMI >30 kg/m2.    INTERVENTION:  Intervention Provided/Education Provided on post-op diet guidelines, vitamins/minerals essential post-operatively, GI anatomy of bariatric surgeries, ways to help prepare for post-op diet guidelines pre-operatively, portion/calorie-control, mindful eating and sources of protein.  Patient demonstrates understanding.     Personal barriers to making and continuing required life changes have been identified, and strategies to overcome those barriers have been recommended AND family and social supports have been assessed and strategies to strengthen those supports have been recommended.    Provided pt with list of goals, RD contact information and resources listed below via [employer].           9/12/2023    12:48 PM   Questions Reviewed With Patient   How ready are you to make  changes regarding your weight? Number 1 = Not ready at all to make changes up to 10 = very ready. 10   How confident are you that you can change? 1 = Not confident that you will be successful making changes up to 10 = very confident. 7     Expected Engagement: good    GOALS:  1) Try to eat 3 small meals daily  2) Have a good protein source at all meals  3) Keep up with hydration, aim for 64 oz of fluid daily.     Follow Up: June 4.     Time spent with patient: 20 minutes.  Nevin Birmingham RD, LD

## 2024-04-25 NOTE — ED NOTES
Patient refused IV insertion unless it was through ultrasound. RN staff tried twice with ultrasound with no luck. Patient did not tolerate IV insertion attempt well. PICC was ordered to start IV

## 2024-04-25 NOTE — PATIENT INSTRUCTIONS
Jay Rooney,     Follow-up with SIDNEY on June 4.     Thank you,    Nevin Birmingham, SIDNEY, LD  If you would like to schedule or reschedule an appointment with the RD, please call [phone]    Nutrition Goals  1) Try to eat 3 small meals daily  2) Have a good protein source at all meals  3) Keep up with hydration, aim for 64 oz of fluid daily.     COMPREHENSIVE WEIGHT MANAGEMENT PROGRAM  VIRTUAL SUPPORT GROUPS    At [facility], our Comprehensive Weight Management program offers on-line support groups for patients who are working on weight loss and considering, preparing for, or have had weight loss surgery.     There is no cost for this opportunity.  You are invited to attend the?Virtual Support Groups?provided by any of the following locations:    [facility] via Microsoft Teams with Roxanna Alonso RN  2.   [place] via Moka with Bird Franz, PhD, LP  3.   [place] via Moka with Margie Stock RN  4.   [facility] via a Zoom Meeting with RUBÉN San    The following Support Group information can also be found on our website:  [email or web address]      [facility] Weight Loss Surgery Support Group  The support group is a patient-lead forum that meets monthly to share experiences, encouragement and education. It is open to those who have had weight loss surgery, are scheduled for surgery, or are considering surgery.   WHEN: This group meets on the 3rd Wednesday of each month from 5:00PM - 6:00PM virtually using Microsoft Teams.   FACILITATOR: Led by Roxanna Greenberg RD, BLADE, RN, the program's Clinical Coordinator.   TO REGISTER: Please contact the clinic via [employer] or call the nurse line directly at [phone] to inform our staff that you would like an invite sent to you and to let us know the email you would like the invite sent to. Prior to the meeting, a link with directions on how  "to join the meeting will be sent to you.    2024 Meetings   January 17  February 21  March 20  April 17  May 15  Candice 19      [facility] Bariatric Care Support Group?  This is open to all pre- and post- operative bariatric surgery patients as well as their support system.   WHEN: This group meets the 3rd Tuesday of each month from 6:30 PM - 8:00 PM virtually using Microsoft Teams.   FACILITATOR: Led by Bird Franz, Ph.D who is a Licensed Psychologist with the [facility] Comprehensive Weight Management Program.   TO REGISTER: Please send an email to Bird Franz, Ph.D.,  at?[email or web address]?if you would like an invitation to the group. Prior to the meeting, a link with directions on how to join the meeting will be sent to you.    2024 Meetings January 16: \"Medication Management and Bariatric Surgery\", Gayle Oconnell, PharmD, Pharmacy Resident at [facility]  February 20: \"A Bariatric Surgery Patient's Perspective\", MYLES Keys, [facility], Behavioral Health Clinician at [facility]  March 19  April 16  May 21  Candice 18: \"Nutritional Labeling\", Dietitian speaker to be announced.  November 19: \"Holiday Eating\", Dietitian speaker to be announced.    [facility] Post-Operative Bariatric Surgery Support Group  This is a support group for [facility] bariatric patients (and those external to [facility]) who have had bariatric surgery and are at least 3 months post-surgery.  WHEN: This support group meets the 4th Wednesday of the month from 11:00 AM - 12:00 PM virtually using Microsoft Teams.   FACILITATOR: Led by Certified Bariatric Nurse, Margie Stock RN.   TO REGISTER: Please send an email to Margie at [email or web address] if you would like an invitation to the group.  Prior to the meeting, a link with directions on how to join " "the meeting will be sent to you.    2024 Meetings  January 24  February 28  March 27  April 24  May 22  Candice 26    [facility] and Home Healthy Lifestyle Group?  This is a 60 minute virtual coaching group for those who want to lead a healthier lifestyle. Come together to set goals and overcome barriers in a supportive group environment. We will address the four pillars of health: nutrition, exercise, sleep and emotional well-being.  This group is highly recommended for those who are participating in the 24 week Healthy Lifestyle Plan and our Health Coaching sessions.  WHEN: This group meets the 1st Friday of the month, 12:30 PM - 1:30 PM online, via a zoom meeting.    FACILITATOR: Led by National Board Certified Health and , Margie Yan [facility].   TO REGISTER: Please call the Call Center at [phone] to register. You will get an appointment to attend in [employer]. Fifteen minutes prior to the meeting, complete the e-check in and you will get the link to join the meeting.    There is no charge to attend this group and space is limited.     2024 Meetings  January 5: \"New Years Vision: Manifest your Best 2024!\" (guided imagery,  journaling and discussion)  February 2: \"Let's Talk\"  March 1: \"10 Percent Happier\" by Jovanni Koenig (Book Bites - a guided discussion on the nuggets of wisdom from favorite wellness books, no need to read the book but highly encouraged)  April 5: \"Let's Talk\"  May 3: \"Essentialism: The Disciplined Pursuit of Less\" by Varinder Franz (Book Bites discussion)  June 7: \"Let's Talk\"  July 5: NO MEETING, off for the 4th of July Holiday  August 2: \"The Blue Zones, Secrets for Living a Longer Life\" by Jovanni Jim (Book Bites discussion)        "

## 2024-04-25 NOTE — ED PROVIDER NOTES
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 N/A 1/25/2019    Procedure: ESOPHAGOGASTRODUODENOSCOPY (EGD) removal of foreign body;  Surgeon: Binu Vigil MD;  Location: [facility] OR;  Service: Gastroenterology    UT ESOPHAGOGASTRODUODENOSCOPY TRANSORAL DIAGNOSTIC N/A 1/31/2019    Procedure: ESOPHAGOGASTRODUODENOSCOPY (EGD);  Surgeon: Siddharth Spears MD;  Location: [facility] OR;  Service: Gastroenterology    UT ESOPHAGOGASTRODUODENOSCOPY TRANSORAL  DIAGNOSTIC N/A 8/17/2019    Procedure: ESOPHAGOGASTRODUODENOSCOPY (EGD) with foreign body removal;  Surgeon: Darek Lucero MD;  Location: [facility];  Service: Gastroenterology    [place] ESOPHAGOGASTRODUODENOSCOPY TRANSORAL DIAGNOSTIC N/A 9/29/2019    Procedure: ESOPHAGOGASTRODUODENOSCOPY (EGD) with foreign body removal;  Surgeon: Bailey Arnold MD;  Location: [facility];  Service: Gastroenterology    VT ESOPHAGOGASTRODUODENOSCOPY TRANSORAL DIAGNOSTIC N/A 10/3/2019    Procedure: ESOPHAGOGASTRODUODENOSCOPY (EGD), REMOVAL OF FOREIGN BODY;  Surgeon: Chris Lira MD;  Location: [facility] [place];  Service: Gastroenterology    VT ESOPHAGOGASTRODUODENOSCOPY TRANSORAL DIAGNOSTIC N/A 10/6/2019    Procedure: ESOPHAGOGASTRODUODENOSCOPY (EGD) with attempted foreign body removal;  Surgeon: Felipe Connolly MD;  Location: [facility];  Service: Gastroenterology    VT ESOPHAGOGASTRODUODENOSCOPY TRANSORAL DIAGNOSTIC N/A 12/15/2019    Procedure: ESOPHAGOGASTRODUODENOSCOPY (EGD) with foreign body removal;  Surgeon: Jeffy Zuñiga MD;  Location: [facility];  Service: Gastroenterology    VT ESOPHAGOGASTRODUODENOSCOPY TRANSORAL DIAGNOSTIC N/A 12/17/2019    Procedure: ESOPHAGOGASTRODUODENOSCOPY (EGD) with attempted foreign body removal;  Surgeon: Felipe Connolly MD;  Location: [facility];  Service: Gastroenterology    VT ESOPHAGOGASTRODUODENOSCOPY TRANSORAL DIAGNOSTIC N/A 12/21/2019    Procedure: ESOPHAGOGASTRODUODENOSCOPY (EGD) FOR FROEIGN BODY REMOVAL;  Surgeon: Binu Vigil MD;  Location: [facility] [place];  Service: Gastroenterology    VT ESOPHAGOGASTRODUODENOSCOPY TRANSORAL DIAGNOSTIC N/A 7/22/2020    Procedure: ESOPHAGOGASTRODUODENOSCOPY (EGD);  Surgeon: Bailey Arnold MD;  Location: [facility] [place];  Service: Gastroenterology    VT ESOPHAGOGASTRODUODENOSCOPY TRANSORAL DIAGNOSTIC N/A 8/14/2020    Procedure: ESOPHAGOGASTRODUODENOSCOPY (EGD) FOREIGN BODY REMOVAL;   Surgeon: Jeffy Zuñiga MD;  Location: [facility];  Service: Gastroenterology    [place] ESOPHAGOGASTRODUODENOSCOPY TRANSORAL DIAGNOSTIC N/A 2/25/2021    Procedure: ESOPHAGOGASTRODUODENOSCOPY (EGD) with foreign body reoval;  Surgeon: Bird Sethi MD;  Location: [facility];  Service: Gastroenterology    [place] ESOPHAGOGASTRODUODENOSCOPY TRANSORAL DIAGNOSTIC N/A 4/19/2021    Procedure: ESOPHAGOGASTRODUODENOSCOPY (EGD);  Surgeon: Libia Rose MD;  Location: [facility] OR;  Service: Gastroenterology    [place] SURG DIAGNOSTIC EXAM, ANORECTAL N/A 2/5/2020    Procedure: EXAM UNDER ANESTHESIA, Flexible Sigmoidoscopy, Retrieval of Foreign Body;  Surgeon: Sasha Ivan MD;  Location: [facility];  Service: General    RELEASE CARPAL TUNNEL Bilateral     RELEASE CARPAL TUNNEL Bilateral     REMOVAL, FOREIGN BODY, RECTUM N/A 7/21/2021    Procedure: MANUAL RETREIVALOF FOREIGN OBJECT- RECTUM.;  Surgeon: Aleksandra Gerber MD;  [facility]    SIGMOIDOSCOPY FLEXIBLE N/A 1/10/2017    Procedure: SIGMOIDOSCOPY FLEXIBLE;  Surgeon: Annmarie Haynes MD;  Location:  OR    SIGMOIDOSCOPY FLEXIBLE N/A 5/8/2018    Procedure: SIGMOIDOSCOPY FLEXIBLE;  flex sig with foreign body removal using snare and rattooth forcep;  Surgeon: Soham Cano MD;  Location: [facility]    SIGMOIDOSCOPY FLEXIBLE N/A 2/20/2019    Procedure: Exam under anesthesia Colonoscopy with attempted  removal of rectal foreign body;  Surgeon: Estrada Chávez MD;  Location:  OR       Family History   Problem Relation Age of Onset    Diabetes Type 2  Maternal Grandmother     Diabetes Type 2  Paternal Grandmother     Breast Cancer Paternal Grandmother     Cerebrovascular Disease Father 53    Myocardial Infarction No family hx of     Coronary Artery Disease Early Onset No family hx of     Ovarian Cancer No family hx of     Colon Cancer No family hx of     Depression Mother     Anxiety Disorder Mother        Social History     Tobacco Use  "   Smoking status: Never    Smokeless tobacco: Never   Substance Use Topics    Alcohol use: No     Alcohol/week: 0.0 standard drinks of alcohol    Drug use: No       diazepam (VALIUM) 5 MG tablet  meclizine (ANTIVERT) 25 MG tablet  acetaminophen (TYLENOL) 500 MG tablet  albuterol (PROAIR HFA/PROVENTIL HFA/VENTOLIN HFA) 108 (90 Base) MCG/ACT inhaler  albuterol (PROVENTIL) (2.5 MG/3ML) 0.083% neb solution  benzonatate (TESSALON) 100 MG capsule  cetirizine (ZYRTEC) 10 MG tablet  Cholecalciferol (D3 HIGH POTENCY) 25 MCG (1000 UT) CAPS  clonazePAM (KLONOPIN) 0.5 MG tablet  cyclobenzaprine (FLEXERIL) 10 MG tablet  ferrous sulfate (FEROSUL) 325 (65 Fe) MG tablet  levofloxacin (LEVAQUIN) 750 MG tablet  methylPREDNISolone (MEDROL DOSEPAK) 4 MG tablet therapy pack  montelukast (SINGULAIR) 10 MG tablet  norethindrone (AYGESTIN) 5 MG tablet  ondansetron (ZOFRAN-ODT) 4 MG ODT tab  pantoprazole (PROTONIX) 40 MG EC tablet  polyethylene glycol (MIRALAX) 17 GM/Dose powder  pregabalin (LYRICA) 100 MG capsule  pregabalin (LYRICA) 100 MG capsule  PSYLLIUM PO  Respiratory Therapy Supplies (NEBULIZER) BRENDAN  rivaroxaban ANTICOAGULANT (XARELTO ANTICOAGULANT) 15 MG TABS tablet  Semaglutide-Weight Management (WEGOVY) 1 MG/0.5ML pen  valACYclovir (VALTREX) 1000 mg tablet        PHYSICAL EXAM     First Vitals:  Patient Vitals for the past 24 hrs:   BP Temp Temp src Pulse Resp SpO2 Height Weight   04/25/24 1545 127/71 -- -- 93 17 95 % -- --   04/25/24 1150 -- -- -- 88 -- 95 % -- --   04/25/24 1119 (!) 144/85 98  F (36.7  C) Oral 91 24 96 % 1.575 m (5' 2\") 110.7 kg (244 lb)       PHYSICAL EXAM:  General Appearance:  Alert, cooperative, no distress, appears stated age. Patient is shaky.  HENT: Normocephalic without obvious deformity, atraumatic. Mucous membranes moist.   Eyes: Conjunctiva clear, Lids normal. No discharge.   Respiratory: No distress. Mild rhonchi noted in LLL.  Cardiovascular: Regular rate and rhythm, no murmur. No peripheral " edema  GI: Abdomen soft, nontender.  Musculoskeletal: Moving all extremities. No gross deformities.  Skin: Warm, dry, no rashes or lesions.  Neurologic: Alert and orientated x3. No focal deficits. Cranial nerves intact.  Psych: Normal mood and affect.      RESULTS     LAB:  All pertinent labs reviewed and interpreted  Labs Ordered and Resulted from Time of ED Arrival to Time of ED Departure   BASIC METABOLIC PANEL - Normal       Result Value    Sodium 141      Potassium 3.7      Chloride 107      Carbon Dioxide (CO2) 24      Anion Gap 10      Urea Nitrogen 12.9      Creatinine 0.54      GFR Estimate >90      Calcium 9.2      Glucose 96     HCG QUALITATIVE PREGNANCY - Normal    hCG Serum Qualitative Negative     MAGNESIUM - Normal    Magnesium 2.0     CBC WITH PLATELETS AND DIFFERENTIAL    WBC Count 8.7      RBC Count 4.49      Hemoglobin 13.6      Hematocrit 39.1      MCV 87      MCH 30.3      MCHC 34.8      RDW 13.6      Platelet Count 283      % Neutrophils 71      % Lymphocytes 20      % Monocytes 6      % Eosinophils 3      % Basophils 0      % Immature Granulocytes 0      NRBCs per 100 WBC 0      Absolute Neutrophils 6.1      Absolute Lymphocytes 1.8      Absolute Monocytes 0.5      Absolute Eosinophils 0.2      Absolute Basophils 0.0      Absolute Immature Granulocytes 0.0      Absolute NRBCs 0.0         RADIOLOGY:  CT Head w/o Contrast   Final Result   IMPRESSION: No evidence of acute hemorrhage, hydrocephalus or transcortical infarct.            ECG:  Performed at: 4/25/24 at 1230    Impression: NSR, rate 89 bpm, no ischemia    Rate: 89 bpm  Rhythm: NSR  Axis: Normal  WV Interval: 150 ms  QRS Interval: 72 ms  QTc Interval: 430 ms  ST Changes: None  Comparison: No significant changes found when compared to ECG from 4/21/24.    EKG results reviewed and interpreted by Dr. Brett ED MD and Niru Rome PA-C.       Niru Rome PA-C  Emergency Medicine   [facility] EMERGENCY  [facility]       Niru Rome PA-C  04/25/24 2765

## 2024-04-25 NOTE — DISCHARGE INSTRUCTIONS
Please take meclizine and valium as needed for room spinning sensation. Continue to eat and hydrate with fluids to make sure symptoms don't worsen. Please return to the ED if you begin to experience vomiting, fever, chills, or lose consciousness. You are being treated appropriately for the pulmonary embolism and pneumonia. Follow-up with your PCP to discuss continued use of Eliquis after the pulmonary embolism has resolved.

## 2024-04-25 NOTE — ED TRIAGE NOTES
Pt presents to ED via EMS with dizziness, nausea, and headaches started roughly 30-45 minutes ago. Pt. States that it has starting to get worse. Pt states that she was newly diagnosed with PE & pneumonia on 4/22. 8/10 pain in pts head. EMS states 2 doses of zofran was given en route. 100% SpO2 with clear and equal respirations.

## 2024-04-27 ENCOUNTER — TELEPHONE (OUTPATIENT)
Dept: ENDOCRINOLOGY | Facility: CLINIC | Age: 34
End: 2024-04-27
Payer: COMMERCIAL

## 2024-04-27 ENCOUNTER — TELEPHONE (OUTPATIENT)
Dept: NURSING | Facility: CLINIC | Age: 34
End: 2024-04-27
Payer: COMMERCIAL

## 2024-04-27 NOTE — LETTER
"2024    To: [employer]    RE: Nevin Alvarado  [address]  [facility] [place] [address]  : 1990  MRN: [id number]    To Whom It May Concern,    I am writing on behalf of my patient, Nevin Alvarado to document the medical necessity of Wegovy for the treatment of: Type II Diabetes, High Blood Pressure, Sleep Apnea, Polycystic Ovarian Syndrome,  GERD (Reflux), Fatty Liver, Asthma, Stress Incontinence. This letter provides information about the patient's medical history and diagnosis and a statement summarizing my treatment rationale.     Summary of Patient History and Diagnosis  Nevin Alvarado is a 33 year old female with a diagnosis of obesity (BMI at least 30 kg/m2).     Estimated body mass index is 44.63 kg/m  as calculated from the following:    Height as of 24: 1.575 m (5' 2\").    Weight as of 24: 110.7 kg (244 lb).    Anti-obesity medication history     Current:   wegovy 1mg       Failed:   Metformin - stopped by PCP2023  when starting wegvoy because A1C was \"good\" (5.4)      Recent diet changes:   Since having this pain she has been naturally eating less   Still Can eat 4 inch sub with meals   Has gotten pain after eating just a pudding cup   Pain with drinking protein shake   Still trying to eat at each meal   Water intake has gone down           CURRENT WEIGHT:   249 lbs 9.6 oz     Initial Weight (lbs): 309 lbs  Last Visits Weight: 117.5 kg (259 lb)  Cumulative weight loss (lbs): 59.4  Weight Loss Percentage: 19.22%    Treatment Rationale  The recent FDA approval of Wegovy for cardiovascular protection, based on the compelling findings of the SELECT trial, approves Wegovy's critical role in addressing not only weight management but also in reducing the risk of cardiovascular events in individuals struggling with excess weight. As the prescribing provider, I firmly believe that Wegovy represents a pivotal intervention in cardiovascular risk management for my " patient.    The SELECT trial, a randomized clinical trial, unequivocally demonstrated the efficacy of Wegovy in reducing the risk of major adverse cardiovascular events (MACE) in individuals who are overweight or obese and have existing cardiovascular disease or multiple cardiovascular risk factors. The trial's results revealed a remarkable reduction in MACE by over 20% compared to placebo, establishing Wegovy as a significant advancement in cardiovascular medicine.    Furthermore, Wegovy's safety profile has been thoroughly evaluated and deemed acceptable by regulatory authorities. The benefits of Wegovy extend beyond weight loss, offering a comprehensive approach to addressing the complex interplay between obesity and cardiovascular risk factors, such as hypertension, dyslipidemia, and insulin resistance.    Denying coverage for Wegovy not only hinders the patient's ability to achieve optimal health outcomes but also contradicts the principles of evidence-based medicine and equitable access to healthcare. I respectfully urge you to reconsider your decision and provide coverage for Wegovy as a medically necessary intervention for cardiovascular protection in individuals who are overweight or obese. By covering Wegovy, you will not only empower patients like Nevin Alvarado to make informed healthcare decisions but also demonstrate your commitment to promoting patient-centric care and reducing the burden of cardiovascular disease.    She would benefit from a treatment option specifically geared towards improving satiety, which is a primary mechanism of action for glucagon-like peptide-1 receptor agonists. The studies on Wegovy (semaglutide 2.4 mg weekly) show highly promising results for the treatment of obesity, with mean weight loss approaching 16% after 68 weeks (Abdifatahen, KAVITA, 2021).        Duration  Up to 12 months.     Summary  In summary, Wegovy is medically necessary for this patient s medical  condition. Please call my office at [phone] if I can provide you with any additional information to approve my request. I look forward to receiving your timely response and approval of this request.     Sincerely,    Sharon Toro CNP

## 2024-04-27 NOTE — TELEPHONE ENCOUNTER
Pt calling to request on call doctor paged for Wegovy refill.  Wegovy injected every Sunday, and tomorrow 4/28 is her last dose. Needs refill.    She said that her insurance sent out an urgent PA request but was denied, and now prescriber needs to submit an appeal. FNA advised that after hours provider does not perform appeals/PA outside of clinic hours.     FNA advised to follow up on Monday 4/29 if she does not hear back from endocrinology.     Please call pt [phone] or send message via [phone].    Isabella Torres RN/[place] Nurse Advisor

## 2024-04-27 NOTE — TELEPHONE ENCOUNTER
Retail Pharmacy Prior Authorization Team   Phone: [phone]      [facility] PA Team received denial letter for prior authorization for patient's   Semaglutide-Weight Management (WEGOVY) 1 MG/0.5ML pen       I will copy/paste the denial letter into this encounter so that the Provider/Clinic can send the appeal on behalf of the patient.

## 2024-04-29 NOTE — TELEPHONE ENCOUNTER
Medication Appeal Request    Please initiate an appeal for the requested medication:      Has a letter of medical necessity been completed in EPIC?   yes    Any additional lab values/information to include?     Would you like to include any research articles?               If yes please include the hyperlink(s) below or fax to    [phone] for Specialty/Retail               [phone] for Infusion/Clinic Administered.                Include the patients name and MRN on the fax cover sheet.

## 2024-04-29 NOTE — TELEPHONE ENCOUNTER
Medication Appeal Initiation    Medication: WEGOVY 1 MG/0.5ML SC SOAJ  Appeal Start Date:  4/29/2024  Insurance Company: [employer]  Insurance Phone: [phone]  Insurance Fax: [phone]  Comments:    faxed appeal letter, appeal form visit notes

## 2024-05-02 ENCOUNTER — TELEPHONE (OUTPATIENT)
Dept: ENDOCRINOLOGY | Facility: CLINIC | Age: 34
End: 2024-05-02

## 2024-05-02 ENCOUNTER — VIRTUAL VISIT (OUTPATIENT)
Dept: ENDOCRINOLOGY | Facility: CLINIC | Age: 34
End: 2024-05-02
Payer: COMMERCIAL

## 2024-05-02 ENCOUNTER — CARE COORDINATION (OUTPATIENT)
Dept: ENDOCRINOLOGY | Facility: CLINIC | Age: 34
End: 2024-05-02

## 2024-05-02 VITALS — HEIGHT: 62 IN | WEIGHT: 248 LBS | BODY MASS INDEX: 45.64 KG/M2

## 2024-05-02 DIAGNOSIS — E66.813 CLASS 3 SEVERE OBESITY WITH SERIOUS COMORBIDITY AND BODY MASS INDEX (BMI) OF 50.0 TO 59.9 IN ADULT, UNSPECIFIED OBESITY TYPE (H): Primary | ICD-10-CM

## 2024-05-02 DIAGNOSIS — E66.01 CLASS 3 SEVERE OBESITY WITH SERIOUS COMORBIDITY AND BODY MASS INDEX (BMI) OF 50.0 TO 59.9 IN ADULT, UNSPECIFIED OBESITY TYPE (H): Primary | ICD-10-CM

## 2024-05-02 PROCEDURE — 99213 OFFICE O/P EST LOW 20 MIN: CPT | Mod: 95 | Performed by: NURSE PRACTITIONER

## 2024-05-02 RX ORDER — HYDROXYZINE HYDROCHLORIDE 25 MG/1
25 TABLET, FILM COATED ORAL
COMMUNITY
Start: 2024-04-22

## 2024-05-02 RX ORDER — APIXABAN 5 MG (74)
KIT ORAL
COMMUNITY
Start: 2024-04-22

## 2024-05-02 RX ORDER — ESCITALOPRAM OXALATE 10 MG/1
10 TABLET ORAL
COMMUNITY
Start: 2024-04-24

## 2024-05-02 ASSESSMENT — PAIN SCALES - GENERAL: PAINLEVEL: MODERATE PAIN (5)

## 2024-05-02 NOTE — NURSING NOTE
Is the patient currently in the state of [place]? YES    Visit mode:VIDEO    If the visit is dropped, the patient can be reconnected by: VIDEO VISIT: Text to cell phone:   Telephone Information:   Mobile [phone]       Will anyone else be joining the visit? NO  (If patient encounters technical issues they should call [phone]    How would you like to obtain your AVS? MyChart    Are changes needed to the allergy or medication list? Pt is taking Eliquis.  Please add.    Are refills needed on medications prescribed by this physician? YES     Reason for visit: RECHECK    Wt other than 24 hrs:    Pain more than one location:  body aches all over  Kailyn ACOSTAF

## 2024-05-02 NOTE — PROGRESS NOTES
Patient had appointment with Sharon Toro today and was updated that the appeal is being worked on. She has no further questions or concerns at this time.

## 2024-05-02 NOTE — LETTER
2024       RE: Nevin Alvarado  [address]  [facility] [place] [address]     Dear Colleague,    Thank you for referring your patient, Neivn Alvarado, to the [facility]. Please see a copy of my visit note below.      Return Medical Weight Management Note     Nevin Alvarado  MRN:  [id number]  :  1990  MOR:  2024    Dear Latonya Knight MD,    I had the pleasure of seeing your patient Nevin Alvarado. She is a 33 year old female who I am continuing to see for treatment of obesity related to:        2023    12:48 PM   --   I have the following health issues associated with obesity Type II Diabetes    High Blood Pressure    Sleep Apnea    Polycystic Ovarian Syndrome    GERD (Reflux)    Fatty Liver    Asthma    Stress Incontinence       Assessment & Plan   Problem List Items Addressed This Visit          Digestive    Class 3 severe obesity with serious comorbidity and body mass index (BMI) of 50.0 to 59.9 in adult, unspecified obesity type (H) - Primary     Expresses concerns about medication coverage today. wegovy denied, waiting for appeal. Notes her insurance did something to the appeal over a weekend to make it more urgent but then she wasn't aware this was time sensitive. Our team has been working on this on the back end. Appears pharmacy manager has changed for her insurance and may no longer be covered.     Hx of DMII. Came to be on rybelsus. Can see A1C >6.5 in . Will try to get ozempic instead. Patient is due this week for injection. Discussed strategies if missing a week.     Focusing on protein when able. Has been ill with URI symptoms still which has impacted appetite. Able to get enough nutrition however. Energy is low and had PT/ OT helping in home.          Relevant Medications    Semaglutide-Weight Management (WEGOVY) 1 MG/0.5ML pen           INTERVAL HISTORY:    Scheduled for hysterectomy in November. Recent PE may complicate the ability to have this.   EGD was negative. Abdominal pain had gotten better some. hospitalized for abdominal pain for 2-3 weeks and subsequent pneumonia     Anti-obesity medication history    Current:   wegovy 1mg   -no reflux/ nausea  -no constipation- more soft/firm stool recently   -last Sunday was last injection     Past/Failed/contraindicated:   rybelsus     Recent diet changes:   Working on hydration - improving   Motivation to eat is difficult   Doing some carnation instant breakfast and ensure   Did some egg bake this week which was helpful   Usually 2 meals a day   Some snacking- alivia grams or chocolate     Recent exercise/activity changes:   Stamina limited since hospitalization - OT going to help her with meal prep this week   Also has PT coming to home as well as nurse     Recent stressors:   Exhaustion limits her normal social interactions   Some increased depression with all of this, has followed up with psychiatry, added lexapro     Recent sleep changes:   improving now that she is home and feeling better     Vitamins/Labs: CBC and BMP wnl 4/2024     CURRENT WEIGHT:   248 lbs 0 oz    Initial Weight (lbs): 309 lbs  Last Visits Weight: 110.7 kg (244 lb)  Cumulative weight loss (lbs): 61  Weight Loss Percentage: 19.74%        1/5/2024     1:00 PM   Changes and Difficulties   I have made the following changes to my diet since my last visit: been sick, so no appetite   I have made the following changes to my activity/exercise since my last visit: none         MEDICATIONS:   Current Outpatient Medications   Medication Sig Dispense Refill    Apixaban Starter Pack (ELIQUIS DVT/PE STARTER PACK) 5 MG TBPK Take 2 tablets (10 mg) by mouth twice daily for 7 days then take 1 tablet (5 mg) twice daily thereafter      escitalopram (LEXAPRO) 10 MG tablet Take 10 mg by mouth      hydrOXYzine HCl (ATARAX) 25 MG tablet Take 25  mg by mouth      Semaglutide-Weight Management (WEGOVY) 1 MG/0.5ML pen Inject 1 mg Subcutaneous once a week 2 mL 3    acetaminophen (TYLENOL) 500 MG tablet Take 2 tablets (1,000 mg) by mouth every 6 hours as needed for pain or fever 60 tablet 0    albuterol (PROAIR HFA/PROVENTIL HFA/VENTOLIN HFA) 108 (90 Base) MCG/ACT inhaler Inhale 2 puffs into the lungs every 6 hours as needed for shortness of breath / dyspnea or wheezing 18 g 0    albuterol (PROVENTIL) (2.5 MG/3ML) 0.083% neb solution Take 1 vial (2.5 mg) by nebulization every 6 hours as needed for shortness of breath or wheezing 90 mL 0    benzonatate (TESSALON) 100 MG capsule Take 1 capsule (100 mg) by mouth 3 times daily as needed for cough 12 capsule 0    cetirizine (ZYRTEC) 10 MG tablet Take 1 tablet (10 mg) by mouth daily 30 tablet 0    Cholecalciferol (D3 HIGH POTENCY) 25 MCG (1000 UT) CAPS Take 50 mcg by mouth daily      clonazePAM (KLONOPIN) 0.5 MG tablet Take 1 tablet (0.5 mg) by mouth daily as needed for anxiety 7 tablet 0    cyclobenzaprine (FLEXERIL) 10 MG tablet Take 1 tablet (10 mg) by mouth 3 times daily as needed for muscle spasms 20 tablet 0    ferrous sulfate (FEROSUL) 325 (65 Fe) MG tablet Take 1 tablet (325 mg) by mouth daily (with breakfast) 30 tablet 0    montelukast (SINGULAIR) 10 MG tablet Take 10 mg by mouth every evening      norethindrone (AYGESTIN) 5 MG tablet Take 5 mg by mouth daily      ondansetron (ZOFRAN-ODT) 4 MG ODT tab Take 1 tablet (4 mg) by mouth every 8 hours as needed for nausea 15 tablet 0    pantoprazole (PROTONIX) 40 MG EC tablet Take 40 mg by mouth daily      polyethylene glycol (MIRALAX) 17 GM/Dose powder Take 17 g by mouth daily as needed for constipation      PSYLLIUM PO Take 1 tsp by mouth daily      Respiratory Therapy Supplies (NEBULIZER) BRENDAN Nebulizer device.  Albuterol nebulization every 2 hours as needed for shortness of breath or wheezing. 1 each 0    Semaglutide, 1 MG/DOSE, (OZEMPIC) 4 MG/3ML pen Inject 1  mg Subcutaneous every 7 days 3 mL 1    valACYclovir (VALTREX) 1000 mg tablet Take 2,000 mg by mouth 2 times daily as needed Take 2 tablets by mouth two times daily for one day. Use as needed at onset of cold sore.             1/5/2024     1:00 PM   Weight Loss Medication History Reviewed With Patient   Which weight loss medications are you currently taking on a regular basis? Wegovy   Are you having any side effects from the weight loss medication that we have prescribed you? No       Admission on 04/25/2024, Discharged on 04/25/2024   Component Date Value Ref Range Status    Sodium 04/25/2024 141  135 - 145 mmol/L Final    Reference intervals for this test were updated on 09/26/2023 to more accurately reflect our healthy population. There may be differences in the flagging of prior results with similar values performed with this method. Interpretation of those prior results can be made in the context of the updated reference intervals.     Potassium 04/25/2024 3.7  3.4 - 5.3 mmol/L Final    Chloride 04/25/2024 107  98 - 107 mmol/L Final    Carbon Dioxide (CO2) 04/25/2024 24  22 - 29 mmol/L Final    Anion Gap 04/25/2024 10  7 - 15 mmol/L Final    Urea Nitrogen 04/25/2024 12.9  6.0 - 20.0 mg/dL Final    Creatinine 04/25/2024 0.54  0.51 - 0.95 mg/dL Final    GFR Estimate 04/25/2024 >90  >60 mL/min/1.73m2 Final    Calcium 04/25/2024 9.2  8.6 - 10.0 mg/dL Final    Glucose 04/25/2024 96  70 - 99 mg/dL Final    hCG Serum Qualitative 04/25/2024 Negative  Negative Final    This test is for screening purposes.  Results should be interpreted along with the clinical picture.  Confirmation testing is available if warranted by ordering [id number], HCG Quantitative Pregnancy.    Magnesium 04/25/2024 2.0  1.7 - 2.3 mg/dL Final    Systolic Blood Pressure 04/25/2024 144  mmHg Final    Diastolic Blood Pressure 04/25/2024 85  mmHg Final    Ventricular Rate 04/25/2024 89  BPM Final    Atrial Rate 04/25/2024 89  BPM Final    CO Interval  "04/25/2024 150  ms Final    QRS Duration 04/25/2024 72  ms Final    QT 04/25/2024 354  ms Final    QTc 04/25/2024 430  ms Final    P Axis 04/25/2024 43  degrees Final    R AXIS 04/25/2024 65  degrees Final    T Axis 04/25/2024 19  degrees Final    Interpretation ECG 04/25/2024    Final                    Value:Sinus rhythm  Cannot rule out Anterior infarct (cited on or before 21-APR-2024)  Abnormal ECG  When compared with ECG of 21-APR-2024 23:25,  No significant change was found  Confirmed by PRIORITY READ, : ([id number]),  DOLORES ROMAN ([id number]) on 4/25/2024 6:55:31 PM      WBC Count 04/25/2024 8.7  4.0 - 11.0 10e3/uL Final    RBC Count 04/25/2024 4.49  3.80 - 5.20 10e6/uL Final    Hemoglobin 04/25/2024 13.6  11.7 - 15.7 g/dL Final    Hematocrit 04/25/2024 39.1  35.0 - 47.0 % Final    MCV 04/25/2024 87  78 - 100 fL Final    MCH 04/25/2024 30.3  26.5 - 33.0 pg Final    MCHC 04/25/2024 34.8  31.5 - 36.5 g/dL Final    RDW 04/25/2024 13.6  10.0 - 15.0 % Final    Platelet Count 04/25/2024 283  150 - 450 10e3/uL Final    % Neutrophils 04/25/2024 71  % Final    % Lymphocytes 04/25/2024 20  % Final    % Monocytes 04/25/2024 6  % Final    % Eosinophils 04/25/2024 3  % Final    % Basophils 04/25/2024 0  % Final    % Immature Granulocytes 04/25/2024 0  % Final    NRBCs per 100 WBC 04/25/2024 0  <1 /100 Final    Absolute Neutrophils 04/25/2024 6.1  1.6 - 8.3 10e3/uL Final    Absolute Lymphocytes 04/25/2024 1.8  0.8 - 5.3 10e3/uL Final    Absolute Monocytes 04/25/2024 0.5  0.0 - 1.3 10e3/uL Final    Absolute Eosinophils 04/25/2024 0.2  0.0 - 0.7 10e3/uL Final    Absolute Basophils 04/25/2024 0.0  0.0 - 0.2 10e3/uL Final    Absolute Immature Granulocytes 04/25/2024 0.0  <=0.4 10e3/uL Final    Absolute NRBCs 04/25/2024 0.0  10e3/uL Final           9/13/2023    10:26 AM   BRIAN Score (Last Two)   BRIAN Raw Score 37   Activation Score 79.2   BRIAN Level 4         PHYSICAL EXAM:  Objective    Ht 1.575 m (5' 2\")   Wt 112.5 kg (248 lb)  "  LMP 07/12/2023   BMI 45.36 kg/m               GENERAL: alert and no distress  EYES: Eyes grossly normal to inspection.  No discharge or erythema, or obvious scleral/conjunctival abnormalities.  RESP: No audible wheeze, cough, or visible cyanosis.    SKIN: Visible skin clear. No significant rash, abnormal pigmentation or lesions.  NEURO: Cranial nerves grossly intact.  Mentation and speech appropriate for age.  PSYCH: Appropriate affect, tone, and pace of words      Sincerely,    Shraon Toro NP      23 minutes spent by me on the date of the encounter doing chart review, history and exam, documentation and further activities per the note    Virtual Visit Details    Type of service:  Video Visit   Video Start Time: 1130  Video End Time:1145    Originating Location (pt. Location): Home    Distant Location (provider location):  Off-site  Platform used for Video Visit: Thuy

## 2024-05-02 NOTE — TELEPHONE ENCOUNTER
Spoke with Rep Yelitza regarding appeal case # [id number] appeal for Wegovy is pending.   I answered all questions for Rep and re faxed all paperwork including appeal letter, visit notes, denial   We should hear by next week

## 2024-05-02 NOTE — TELEPHONE ENCOUNTER
Pt. scheduled an appointment with Sharon trinh at 11;30 am with an 11:15 am check in for a video visit.

## 2024-05-02 NOTE — PROGRESS NOTES
Return Medical Weight Management Note     Nevin Alvarado  MRN:  [id number]  :  1990  MOR:  2024    Dear Latonya Knight MD,    I had the pleasure of seeing your patient Nevin Alvarado. She is a 33 year old female who I am continuing to see for treatment of obesity related to:        2023    12:48 PM   --   I have the following health issues associated with obesity Type II Diabetes    High Blood Pressure    Sleep Apnea    Polycystic Ovarian Syndrome    GERD (Reflux)    Fatty Liver    Asthma    Stress Incontinence       Assessment & Plan   Problem List Items Addressed This Visit        Digestive    Class 3 severe obesity with serious comorbidity and body mass index (BMI) of 50.0 to 59.9 in adult, unspecified obesity type (H) - Primary     Expresses concerns about medication coverage today. wegovy denied, waiting for appeal. Notes her insurance did something to the appeal over a weekend to make it more urgent but then she wasn't aware this was time sensitive. Our team has been working on this on the back end. Appears pharmacy manager has changed for her insurance and may no longer be covered.     Hx of DMII. Came to be on rybelsus. Can see A1C >6.5 in . Will try to get ozempic instead. Patient is due this week for injection. Discussed strategies if missing a week.     Focusing on protein when able. Has been ill with URI symptoms still which has impacted appetite. Able to get enough nutrition however. Energy is low and had PT/ OT helping in home.          Relevant Medications    Semaglutide-Weight Management (WEGOVY) 1 MG/0.5ML pen          INTERVAL HISTORY:    Scheduled for hysterectomy in November. Recent PE may complicate the ability to have this.   EGD was negative. Abdominal pain had gotten better some. hospitalized for abdominal pain for 2-3 weeks and subsequent pneumonia     Anti-obesity medication history    Current:   wegovy 1mg   -no reflux/ nausea  -no constipation-  more soft/firm stool recently   -last Sunday was last injection     Past/Failed/contraindicated:   rybelsus     Recent diet changes:   Working on hydration - improving   Motivation to eat is difficult   Doing some carnation instant breakfast and ensure   Did some egg bake this week which was helpful   Usually 2 meals a day   Some snacking- alivia grams or chocolate     Recent exercise/activity changes:   Stamina limited since hospitalization - OT going to help her with meal prep this week   Also has PT coming to home as well as nurse     Recent stressors:   Exhaustion limits her normal social interactions   Some increased depression with all of this, has followed up with psychiatry, added lexapro     Recent sleep changes:   improving now that she is home and feeling better     Vitamins/Labs: CBC and BMP wnl 4/2024     CURRENT WEIGHT:   248 lbs 0 oz    Initial Weight (lbs): 309 lbs  Last Visits Weight: 110.7 kg (244 lb)  Cumulative weight loss (lbs): 61  Weight Loss Percentage: 19.74%        1/5/2024     1:00 PM   Changes and Difficulties   I have made the following changes to my diet since my last visit: been sick, so no appetite   I have made the following changes to my activity/exercise since my last visit: none         MEDICATIONS:   Current Outpatient Medications   Medication Sig Dispense Refill     Apixaban Starter Pack (ELIQUIS DVT/PE STARTER PACK) 5 MG TBPK Take 2 tablets (10 mg) by mouth twice daily for 7 days then take 1 tablet (5 mg) twice daily thereafter       escitalopram (LEXAPRO) 10 MG tablet Take 10 mg by mouth       hydrOXYzine HCl (ATARAX) 25 MG tablet Take 25 mg by mouth       Semaglutide-Weight Management (WEGOVY) 1 MG/0.5ML pen Inject 1 mg Subcutaneous once a week 2 mL 3     acetaminophen (TYLENOL) 500 MG tablet Take 2 tablets (1,000 mg) by mouth every 6 hours as needed for pain or fever 60 tablet 0     albuterol (PROAIR HFA/PROVENTIL HFA/VENTOLIN HFA) 108 (90 Base) MCG/ACT inhaler Inhale 2 puffs  into the lungs every 6 hours as needed for shortness of breath / dyspnea or wheezing 18 g 0     albuterol (PROVENTIL) (2.5 MG/3ML) 0.083% neb solution Take 1 vial (2.5 mg) by nebulization every 6 hours as needed for shortness of breath or wheezing 90 mL 0     benzonatate (TESSALON) 100 MG capsule Take 1 capsule (100 mg) by mouth 3 times daily as needed for cough 12 capsule 0     cetirizine (ZYRTEC) 10 MG tablet Take 1 tablet (10 mg) by mouth daily 30 tablet 0     Cholecalciferol (D3 HIGH POTENCY) 25 MCG (1000 UT) CAPS Take 50 mcg by mouth daily       clonazePAM (KLONOPIN) 0.5 MG tablet Take 1 tablet (0.5 mg) by mouth daily as needed for anxiety 7 tablet 0     cyclobenzaprine (FLEXERIL) 10 MG tablet Take 1 tablet (10 mg) by mouth 3 times daily as needed for muscle spasms 20 tablet 0     ferrous sulfate (FEROSUL) 325 (65 Fe) MG tablet Take 1 tablet (325 mg) by mouth daily (with breakfast) 30 tablet 0     montelukast (SINGULAIR) 10 MG tablet Take 10 mg by mouth every evening       norethindrone (AYGESTIN) 5 MG tablet Take 5 mg by mouth daily       ondansetron (ZOFRAN-ODT) 4 MG ODT tab Take 1 tablet (4 mg) by mouth every 8 hours as needed for nausea 15 tablet 0     pantoprazole (PROTONIX) 40 MG EC tablet Take 40 mg by mouth daily       polyethylene glycol (MIRALAX) 17 GM/Dose powder Take 17 g by mouth daily as needed for constipation       PSYLLIUM PO Take 1 tsp by mouth daily       Respiratory Therapy Supplies (NEBULIZER) BRENDAN Nebulizer device.  Albuterol nebulization every 2 hours as needed for shortness of breath or wheezing. 1 each 0     Semaglutide, 1 MG/DOSE, (OZEMPIC) 4 MG/3ML pen Inject 1 mg Subcutaneous every 7 days 3 mL 1     valACYclovir (VALTREX) 1000 mg tablet Take 2,000 mg by mouth 2 times daily as needed Take 2 tablets by mouth two times daily for one day. Use as needed at onset of cold sore.             1/5/2024     1:00 PM   Weight Loss Medication History Reviewed With Patient   Which weight loss  medications are you currently taking on a regular basis? Wegovy   Are you having any side effects from the weight loss medication that we have prescribed you? No       Admission on 04/25/2024, Discharged on 04/25/2024   Component Date Value Ref Range Status     Sodium 04/25/2024 141  135 - 145 mmol/L Final    Reference intervals for this test were updated on 09/26/2023 to more accurately reflect our healthy population. There may be differences in the flagging of prior results with similar values performed with this method. Interpretation of those prior results can be made in the context of the updated reference intervals.      Potassium 04/25/2024 3.7  3.4 - 5.3 mmol/L Final     Chloride 04/25/2024 107  98 - 107 mmol/L Final     Carbon Dioxide (CO2) 04/25/2024 24  22 - 29 mmol/L Final     Anion Gap 04/25/2024 10  7 - 15 mmol/L Final     Urea Nitrogen 04/25/2024 12.9  6.0 - 20.0 mg/dL Final     Creatinine 04/25/2024 0.54  0.51 - 0.95 mg/dL Final     GFR Estimate 04/25/2024 >90  >60 mL/min/1.73m2 Final     Calcium 04/25/2024 9.2  8.6 - 10.0 mg/dL Final     Glucose 04/25/2024 96  70 - 99 mg/dL Final     hCG Serum Qualitative 04/25/2024 Negative  Negative Final    This test is for screening purposes.  Results should be interpreted along with the clinical picture.  Confirmation testing is available if warranted by ordering [id number], HCG Quantitative Pregnancy.     Magnesium 04/25/2024 2.0  1.7 - 2.3 mg/dL Final     Systolic Blood Pressure 04/25/2024 144  mmHg Final     Diastolic Blood Pressure 04/25/2024 85  mmHg Final     Ventricular Rate 04/25/2024 89  BPM Final     Atrial Rate 04/25/2024 89  BPM Final     [place] Interval 04/25/2024 150  ms Final     QRS Duration 04/25/2024 72  ms Final     QT 04/25/2024 354  ms Final     QTc 04/25/2024 430  ms Final     P Axis 04/25/2024 43  degrees Final     R AXIS 04/25/2024 65  degrees Final     T Axis 04/25/2024 19  degrees Final     Interpretation ECG 04/25/2024    Final                 "    Value:Sinus rhythm  Cannot rule out Anterior infarct (cited on or before 21-APR-2024)  Abnormal ECG  When compared with ECG of 21-APR-2024 23:25,  No significant change was found  Confirmed by PRIORITY READ, : ([id number]),  DOLORES ROMAN ([id number]) on 4/25/2024 6:55:31 PM       WBC Count 04/25/2024 8.7  4.0 - 11.0 10e3/uL Final     RBC Count 04/25/2024 4.49  3.80 - 5.20 10e6/uL Final     Hemoglobin 04/25/2024 13.6  11.7 - 15.7 g/dL Final     Hematocrit 04/25/2024 39.1  35.0 - 47.0 % Final     MCV 04/25/2024 87  78 - 100 fL Final     MCH 04/25/2024 30.3  26.5 - 33.0 pg Final     MCHC 04/25/2024 34.8  31.5 - 36.5 g/dL Final     RDW 04/25/2024 13.6  10.0 - 15.0 % Final     Platelet Count 04/25/2024 283  150 - 450 10e3/uL Final     % Neutrophils 04/25/2024 71  % Final     % Lymphocytes 04/25/2024 20  % Final     % Monocytes 04/25/2024 6  % Final     % Eosinophils 04/25/2024 3  % Final     % Basophils 04/25/2024 0  % Final     % Immature Granulocytes 04/25/2024 0  % Final     NRBCs per 100 WBC 04/25/2024 0  <1 /100 Final     Absolute Neutrophils 04/25/2024 6.1  1.6 - 8.3 10e3/uL Final     Absolute Lymphocytes 04/25/2024 1.8  0.8 - 5.3 10e3/uL Final     Absolute Monocytes 04/25/2024 0.5  0.0 - 1.3 10e3/uL Final     Absolute Eosinophils 04/25/2024 0.2  0.0 - 0.7 10e3/uL Final     Absolute Basophils 04/25/2024 0.0  0.0 - 0.2 10e3/uL Final     Absolute Immature Granulocytes 04/25/2024 0.0  <=0.4 10e3/uL Final     Absolute NRBCs 04/25/2024 0.0  10e3/uL Final           9/13/2023    10:26 AM   BRIAN Score (Last Two)   BRIAN Raw Score 37   Activation Score 79.2   BRIAN Level 4         PHYSICAL EXAM:  Objective    Ht 1.575 m (5' 2\")   Wt 112.5 kg (248 lb)   LMP 07/12/2023   BMI 45.36 kg/m               GENERAL: alert and no distress  EYES: Eyes grossly normal to inspection.  No discharge or erythema, or obvious scleral/conjunctival abnormalities.  RESP: No audible wheeze, cough, or visible cyanosis.    SKIN: Visible skin clear. " No significant rash, abnormal pigmentation or lesions.  NEURO: Cranial nerves grossly intact.  Mentation and speech appropriate for age.  PSYCH: Appropriate affect, tone, and pace of words      Sincerely,    Sharon Toro NP      23 minutes spent by me on the date of the encounter doing chart review, history and exam, documentation and further activities per the note    Virtual Visit Details    Type of service:  Video Visit   Video Start Time: 1130  Video End Time:1145    Originating Location (pt. Location): Home    Distant Location (provider location):  Off-site  Platform used for Video Visit: [employer]

## 2024-05-02 NOTE — TELEPHONE ENCOUNTER
FYI - Status Update    Who is Calling: patient    Update: Wanting to follow-up on the appeal - patient is adamant about needing this done before the weekend or at least an update be given by tomorrow before the clinics are closed.    Does caller want a call/response back: Yes     Could we send this information to you in [employer] or would you prefer to receive a phone call?:   Patient would like to be contacted via [employer]

## 2024-05-03 ENCOUNTER — TELEPHONE (OUTPATIENT)
Dept: ENDOCRINOLOGY | Facility: CLINIC | Age: 34
End: 2024-05-03

## 2024-05-03 NOTE — TELEPHONE ENCOUNTER
I talked to [employer] (pharmacy benefit manager for [employer]. Rep stated patient does have the same plan but they will only approve weight loss drugs for 7 months, after that they will only approve if patient has any cardiovascular disease or events.   The drug is not covered for weight loss only after the initial 7 months

## 2024-05-03 NOTE — TELEPHONE ENCOUNTER
MEDICATION APPEAL DENIED    Medication: WEGOVY 1 MG/0.5ML SC SOAJ  Insurance Company: [employer]  Denial Date: 5/2/2024  Denial Reason(s):   Second Level Appeal Information:   Patient Notified:    Central Prior Authorization Team ONLY: Second level appeals will be managed by the clinic staff and provider. Please contact the [facility] Prior Authorization Team if additional information about the denial is needed.

## 2024-05-03 NOTE — TELEPHONE ENCOUNTER
Discussed with patient via [employer]. Waiting for MARY[facility] to review denial to determine next steps.

## 2024-05-03 NOTE — TELEPHONE ENCOUNTER
General Call    Contacts         Type Contact Phone/Fax    05/03/2024 07:23 AM CDT Phone (Incoming) Nevin Alvarado (Self) [phone] (M)          Reason for Call:  call back    What are your questions or concerns:  pt is very upset her medication PA was denied and clinic didn't call her    Could we send this information to you in [facility] or would you prefer to receive a phone call?:   Patient would prefer a phone call   Okay to leave a detailed message?: Yes at Cell number on file:    Telephone Information:   Mobile [phone]

## 2024-05-06 ENCOUNTER — TELEPHONE (OUTPATIENT)
Dept: ENDOCRINOLOGY | Facility: CLINIC | Age: 34
End: 2024-05-06
Payer: COMMERCIAL

## 2024-05-06 NOTE — TELEPHONE ENCOUNTER
Prior Authorization Approval    Medication: OZEMPIC (1 MG/DOSE) 4 MG/3ML SC SOPN  Authorization Effective Date: 4/6/2024  Authorization Expiration Date: 5/6/2025  Approved Dose/Quantity: 3ml per 28 days  Reference #: Key: [id number]   Insurance Company: [employer] - Phone [phone] Fax [phone]  Expected CoPay: $ 0  CoPay Card Available:      Financial Assistance Needed: no  Which Pharmacy is filling the prescription: [id number] [address] AT [address]  Pharmacy Notified: yes  Patient Notified: no

## 2024-05-06 NOTE — TELEPHONE ENCOUNTER
PA Initiation    Medication: OZEMPIC (1 MG/DOSE) 4 MG/3ML SC SOPN  Insurance Company: [employer] - Phone [phone] Fax [phone]  Pharmacy Filling the Rx: [email or web address] #[id number] [place] [address] AT [facility] & [address]  Filling Pharmacy Phone:    Filling Pharmacy Fax:    Start Date: 5/6/2024    Key: [id number]     PA already submitted case #[id number]

## 2024-05-06 NOTE — TELEPHONE ENCOUNTER
General Call    Contacts         Type Contact Phone/Fax    05/06/2024 08:04 AM CDT Phone (Incoming) Nevin Alvarado (Self) [phone] (M)          Reason for Call:  Please call this pt about when her Ozempic will be sent to pharm.        [facility] or would you prefer to receive a phone call?:   Patient would prefer a phone call   Okay to leave a detailed message?: Yes at Cell number on file:    Telephone Information:   Mobile [phone]

## 2024-05-07 ENCOUNTER — MEDICAL CORRESPONDENCE (OUTPATIENT)
Dept: HEALTH INFORMATION MANAGEMENT | Facility: CLINIC | Age: 34
End: 2024-05-07

## 2024-05-08 ENCOUNTER — VIRTUAL VISIT (OUTPATIENT)
Dept: GASTROENTEROLOGY | Facility: CLINIC | Age: 34
End: 2024-05-08
Attending: PHYSICIAN ASSISTANT
Payer: COMMERCIAL

## 2024-05-08 DIAGNOSIS — R10.9 ABDOMINAL CRAMPING: Primary | ICD-10-CM

## 2024-05-08 PROCEDURE — 99214 OFFICE O/P EST MOD 30 MIN: CPT | Mod: 95 | Performed by: PHYSICIAN ASSISTANT

## 2024-05-08 RX ORDER — DICYCLOMINE HYDROCHLORIDE 10 MG/1
10 CAPSULE ORAL DAILY PRN
Qty: 10 CAPSULE | Refills: 1 | Status: SHIPPED | OUTPATIENT
Start: 2024-05-08 | End: 2024-08-05

## 2024-05-08 RX ORDER — DICYCLOMINE HYDROCHLORIDE 10 MG/1
10 CAPSULE ORAL DAILY PRN
Qty: 10 CAPSULE | Refills: 1 | Status: SHIPPED | OUTPATIENT
Start: 2024-05-08 | End: 2024-05-08

## 2024-05-08 NOTE — PATIENT INSTRUCTIONS
It was a pleasure taking care of you today.  I've included a brief summary of our discussion and care plan from today's visit below.  Please review this information with your primary care provider.  _______________________________________________________________________    My recommendations are summarized as follows:    - Please continue to follow-up up with primary care provider and behavioral health team.  It is strongly recommended to continue regular therapy sessions without de-escalation and frequency  - Trial of dicyclomine 10mg to use once daily as needed for abdominal cramping  - Cancel upper endoscopy scheduled for 9/2024  - Discontinue Protonix 40mg once daily   - Reflux friendly lifestyle modifications as directed in the AVS      Gastroesophageal Reflux Disease (GERD) Lifestyle Modifications:   If taking acid suppression therapy (PPI ie Pantoprazole, Lansoprazole, Omeprazole, Esomeprazole, Rabeprazole, Dexlansoprazole) it should be taken 30 - 60 minutes prior to meals on an empty stomach to have maximum effect  Avoid triggers for reflux such as coffee, chocolate, carbonated beverages, spicy foods, acidic foods (tomato based/citrus and foods with high fat content   Abstinence from alcohol and cessation of all tobacco products is recommended   Studies have shown that weight loss, exercise and maintaining a healthy BMI significantly reduce GERD symptoms   Remain upright while eating and immediately after meals  Do not eat or drink at least 3 hours prior to laying down supine/laying down for bed   Avoid late night/middle of the night snacking    Consider obtaining a wedge pillow or elevating the head end of the bed while sleeping   Avoid sleeping right side down as this can place the lower part of the esophagus/lower esophageal sphincter in a dependent position that favors reflux   Attempting to eat smaller more frequent meals may improve symptoms       To schedule endoscopic procedures you may call:  [phone]  To schedule radiology (imaging) tests you may call: [phone]  To schedule an ENT appointment you may call: [phone]    Please call my nurse Arianna ([phone]), Roberta ([phone]) with any questions or concerns.      Return to GI Clinic in 4 months to review your progress.    _______________________________________________________________________    Who do I call with any questions after my visit?  Please be in touch if there are any further questions that arise following today's visit.  There are multiple ways to contact your gastroenterology care team.      During business hours, you may reach a Gastroenterology nurse at [phone] and choose option 3.       To schedule or reschedule an appointment, please call [phone].     You can always send a secure message through [employer].  [employer] messages are answered by your nurse or doctor typically within 24 hours.  Please allow extra time on weekends and holidays.      For urgent/emergent questions after business hours, you may reach the on-call GI Fellow by contacting the [facility]  at [phone].     How will I get the results of any tests ordered?    You will receive all of your results.  If you have signed up for [email or web address], any tests ordered at your visit will be available to you after your physician reviews them.  Typically this takes 1-2 weeks.  If there are urgent results that require a change in your care plan, your physician or nurse will call you to discuss the next steps.      What is [employer]?  [employer] is a secure way for you to access all of your healthcare records from the [facility].  It is a web based computer program, so you can sign on to it from any location.  It also allows you to send secure messages to your care team.  I recommend signing up for [employer] access if you have not already done so and are comfortable with using a computer.      How to I schedule a follow-up visit?  If you did  not schedule a follow-up visit today, please call [phone] to schedule a follow-up office visit.      If you feel you received exceptional care and are interested in supporting the clinical and research goals of Carli Potter PA-C or the Division of Gastroenterology, Hepatology, and Nutrition please contact [email or web address] from the [facility] to discuss opportunities to donate.    Sincerely,    Carli Potter PA-C  Division of Gastroenterology, Hepatology, and Nutrition  [facility]

## 2024-05-08 NOTE — LETTER
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  [facility]    ESOPHAGOSCOPY, GASTROSCOPY, DUODENOSCOPY (EGD), COMBINED N/A 1/18/2020    Procedure: Diagnostic ESOPHAGOGASTRODUODENOSCOPY (EGD;  Surgeon: Lokesh Paula MD;  [facility]    ESOPHAGOSCOPY, GASTROSCOPY, DUODENOSCOPY (EGD), COMBINED N/A 3/29/2020    Procedure: UPPER ENDOSCOPY WITH FOREIGN BODY REMOVAL;  Surgeon: Doug Hansen MD;  [facility]    ESOPHAGOSCOPY, GASTROSCOPY, DUODENOSCOPY (EGD), COMBINED N/A 7/11/2020    Procedure: ESOPHAGOGASTRODUODENOSCOPY (EGD); Upper Endoscopy WITH FOREIGN BODY REMOVAL;  Surgeon: Lyndesy Mendoza DO;  [facility]    ESOPHAGOSCOPY, GASTROSCOPY, DUODENOSCOPY (EGD), COMBINED N/A 7/29/2020    Procedure: ESOPHAGOGASTRODUODENOSCOPY REMOVAL OF FOREIGN BODY;  Surgeon: Samia Stanton MD;  [facility]    ESOPHAGOSCOPY, GASTROSCOPY, DUODENOSCOPY (EGD), COMBINED N/A 8/1/2020    Procedure: ESOPHAGOGASTRODUODENOSCOPY, WITH FOREIGN BODY REMOVAL;  Surgeon: Pamela Perez MD;  [facility]    ESOPHAGOSCOPY,  GASTROSCOPY, DUODENOSCOPY (EGD), COMBINED N/A 8/18/2020    Procedure: ESOPHAGOGASTRODUODENOSCOPY (EGD) for foreign body removal;  Surgeon: Pamela Perez MD;  [facility]    ESOPHAGOSCOPY, GASTROSCOPY, DUODENOSCOPY (EGD), COMBINED N/A 8/27/2020    Procedure: ESOPHAGOGASTRODUODENOSCOPY (EGD) with foreign body removal;  Surgeon: Campbell Rogers MD;  [facility]    ESOPHAGOSCOPY, GASTROSCOPY, DUODENOSCOPY (EGD), COMBINED N/A 9/18/2020    Procedure: ESOPHAGOGASTRODUODENOSCOPY (EGD) with foreign body removal;  Surgeon: Dick Gillis MD;  [facility]    ESOPHAGOSCOPY, GASTROSCOPY, DUODENOSCOPY (EGD), COMBINED N/A 11/18/2020    Procedure: ESOPHAGOGASTRODUODENOSCOPY, WITH FOREIGN BODY REMOVAL;  Surgeon: Felipe Ulloa DO;  [facility]    ESOPHAGOSCOPY, GASTROSCOPY, DUODENOSCOPY (EGD), COMBINED N/A 11/28/2020    Procedure: ESOPHAGOGASTRODUODENOSCOPY (EGD);  Surgeon: Campbell Rogers MD;  [facility]    ESOPHAGOSCOPY, GASTROSCOPY, DUODENOSCOPY (EGD), COMBINED N/A 3/12/2021    Procedure: ESOPHAGOGASTRODUODENOSCOPY, WITH FOREIGN BODY REMOVAL using cold snare;  Surgeon: Marianna Rudolph MD;  Location: [facility] [address]    ESOPHAGOSCOPY, GASTROSCOPY, DUODENOSCOPY (EGD), COMBINED N/A 12/10/2017    Procedure: ESOPHAGOGASTRODUODENOSCOPY (EGD) with foreign body removal;  Surgeon: Lila Sol MD;  Location: [facility];  Service:     ESOPHAGOSCOPY, GASTROSCOPY, DUODENOSCOPY (EGD), COMBINED N/A 2/13/2018    Procedure: ESOPHAGOGASTRODUODENOSCOPY (EGD);  Surgeon: Barney Pinto MD;  Location: [facility];  Service:     ESOPHAGOSCOPY, GASTROSCOPY, DUODENOSCOPY (EGD), COMBINED N/A 11/9/2018    Procedure: UPPER ENDOSCOPY, FOREIGN BODY REMOVAL;  Surgeon: Cristino Kelsey MD;  Location: [facility];  Service: Gastroenterology    ESOPHAGOSCOPY, GASTROSCOPY, DUODENOSCOPY (EGD), COMBINED N/A 11/17/2018    Procedure: ESOPHAGOGASTRODUODENOSCOPY (EGD) with foreign  body removal;  Surgeon: Gustavo Mathew MD;  Location: [facility];  Service: Gastroenterology    ESOPHAGOSCOPY, GASTROSCOPY, DUODENOSCOPY (EGD), COMBINED N/A 11/22/2018    Procedure: ESOPHAGOGASTRODUODENOSCOPY (EGD);  Surgeon: Binu Vigil MD;  Location: [facility];  Service: Gastroenterology    ESOPHAGOSCOPY, GASTROSCOPY, DUODENOSCOPY (EGD), COMBINED N/A 11/25/2018    Procedure: UPPER ENDOSCOPY TO REMOVE PAPER CLIPS;  Surgeon: Hira Jacobs MD;  Location: [facility];  Service: Gastroenterology    ESOPHAGOSCOPY, GASTROSCOPY, DUODENOSCOPY (EGD), COMBINED N/A 8/1/2021    Procedure: ESOPHAGOGASTRODUODENOSCOPY (EGD);  Surgeon: Binu Vigil MD;  Location: [facility]    ESOPHAGOSCOPY, GASTROSCOPY, DUODENOSCOPY (EGD), COMBINED N/A 7/31/2021    Procedure: ESOPHAGOGASTRODUODENOSCOPY (EGD);  Surgeon: Keith Quinn MD;  Location: [facility]    ESOPHAGOSCOPY, GASTROSCOPY, DUODENOSCOPY (EGD), COMBINED N/A 8/13/2021    Procedure: ESOPHAGOGASTRODUODENOSCOPY (EGD);  Surgeon: Gustavo Mathew MD;  Location: [facility]    ESOPHAGOSCOPY, GASTROSCOPY, DUODENOSCOPY (EGD), COMBINED N/A 8/13/2021    Procedure: ESOPHAGOGASTRODUODENOSCOPY (EGD) with foreign body removal;  Surgeon: Gustavo Mathew MD;  Location: [facility]    ESOPHAGOSCOPY, GASTROSCOPY, DUODENOSCOPY (EGD), COMBINED N/A 1/30/2022    Procedure: ESOPHAGOGASTRODUODENOSCOPY (EGD) FOREIGN BODY REMOVAL;  Surgeon: Bird Sethi MD;  Location: [facility]    ESOPHAGOSCOPY, GASTROSCOPY, DUODENOSCOPY (EGD), COMBINED N/A 2/3/2022    Procedure: ESOPHAGOGASTRODUODENOSCOPY (EGD), FOREIGN BODY REMOVAL;  Surgeon: Binu Vigil MD;  Location: [facility]    ESOPHAGOSCOPY, GASTROSCOPY, DUODENOSCOPY (EGD), COMBINED N/A 2/7/2022    Procedure: ESOPHAGOGASTRODUODENOSCOPY (EGD) WITH FOREIGN BODY REMOVAL;  Surgeon: Darek Mendoza MD;  Location: [facility] OR    ESOPHAGOSCOPY, GASTROSCOPY, DUODENOSCOPY (EGD),  COMBINED N/A 2/8/2022    Procedure: ESOPHAGOGASTRODUODENOSCOPY (EGD), foreign body removal;  Surgeon: Lyndsey Mendoza DO;  [facility]    ESOPHAGOSCOPY, GASTROSCOPY, DUODENOSCOPY (EGD), COMBINED N/A 2/15/2022    Procedure: UPPER ESOPHAGOGASTRODUODENOSCOPY, WITH FOREIGN BODY REMOVAL AND USE OF BLANKENSHIP;  Surgeon: Samia Stanton MD;  [facility]    ESOPHAGOSCOPY, GASTROSCOPY, DUODENOSCOPY (EGD), COMBINED N/A 7/9/2022    Procedure: ESOPHAGOGASTRODUODENOSCOPY (EGD) with foreign body extraction;  Surgeon: Felipe Ulloa DO;  [facility]    ESOPHAGOSCOPY, GASTROSCOPY, DUODENOSCOPY (EGD), COMBINED N/A 7/29/2022    Procedure: ESOPHAGOGASTRODUODENOSCOPY (EGD) WITH FOREIGN BODY REMOVAL;  Surgeon: Pamela Perez MD;  [facility]    ESOPHAGOSCOPY, GASTROSCOPY, DUODENOSCOPY (EGD), COMBINED N/A 8/6/2022    Procedure: ESOPHAGOGASTRODUODENOSCOPY, WITH FOREIGN BODY REMOVAL;  Surgeon: Bety Nova MD;  [facility]    ESOPHAGOSCOPY, GASTROSCOPY, DUODENOSCOPY (EGD), COMBINED N/A 8/13/2022    Procedure: ESOPHAGOGASTRODUODENOSCOPY, WITH FOREIGN BODY REMOVAL using raptor device;  Surgeon: Brice Ramirez MD;  [facility]    ESOPHAGOSCOPY, GASTROSCOPY, DUODENOSCOPY (EGD), COMBINED N/A 8/24/2022    Procedure: ESOPHAGOGASTRODUODENOSCOPY (EGD);  Surgeon: Jeffy Bradley MD;  [facility]    ESOPHAGOSCOPY, GASTROSCOPY, DUODENOSCOPY (EGD), COMBINED N/A 9/17/2022    Procedure: ESOPHAGOGASTRODUODENOSCOPY (EGD), Foreign Body removal;  Surgeon: Pamela Perez MD;  [facility]    ESOPHAGOSCOPY, GASTROSCOPY, DUODENOSCOPY (EGD), COMBINED N/A 9/25/2022    Procedure: ESOPHAGOGASTRODUODENOSCOPY, WITH FOREIGN BODY REMOVAL;  Surgeon: Kash Griffin MD;  Location:  [facility]    ESOPHAGOSCOPY, GASTROSCOPY, DUODENOSCOPY (EGD), COMBINED N/A 10/23/2022    Procedure: ESOPHAGOGASTRODUODENOSCOPY (EGD) FOR REMOVAL OF FOREIGN BODY;  Surgeon: Barney Pinto MD;  Location: [facility]    ESOPHAGOSCOPY, GASTROSCOPY, DUODENOSCOPY (EGD), COMBINED N/A 11/3/2022    Procedure: ESOPHAGOGASTRODUODENOSCOPY (EGD) for foreign body removal;  Surgeon: Cruz Kumar MD;  Location: [facility]    ESOPHAGOSCOPY, GASTROSCOPY, DUODENOSCOPY (EGD), COMBINED N/A 11/29/2022    Procedure: ESOPHAGOGASTRODUODENOSCOPY (EGD);  Surgeon: Cristino Kelsey MD, MD;  Location: [facility]    ESOPHAGOSCOPY, GASTROSCOPY, DUODENOSCOPY (EGD), COMBINED N/A 12/8/2022    Procedure: ESOPHAGOGASTRODUODENOSCOPY (EGD) with foreign body removal;  Surgeon: Efrem Begum MD;  Location: [facility]    ESOPHAGOSCOPY, GASTROSCOPY, DUODENOSCOPY (EGD), COMBINED N/A 12/28/2022    Procedure: ESOPHAGOGASTRODUODENOSCOPY, WITH FOREIGN BODY REMOVAL;  Surgeon: Dogu Hansen MD;  Location:  [facility]    ESOPHAGOSCOPY, GASTROSCOPY, DUODENOSCOPY (EGD), COMBINED N/A 1/20/2023    Procedure: ESOPHAGOGASTRODUODENOSCOPY (EGD);  Surgeon: Bety Nova MD;  Location:  [facility]    ESOPHAGOSCOPY, GASTROSCOPY, DUODENOSCOPY (EGD), COMBINED N/A 3/11/2023    Procedure: ESOPHAGOGASTRODUODENOSCOPY WITH FOREIGN BODY REMOVAL;  Surgeon: Cruz Kumar MD;  Location: [facility]    ESOPHAGOSCOPY, GASTROSCOPY, DUODENOSCOPY (EGD), COMBINED N/A 10/16/2023    Procedure: ESOPHAGOGASTRODUODENOSCOPY (EGD) WITH FOREIGN BODY REMOVAL;  Surgeon: Cruz Kumar MD;  Location: [facility]    ESOPHAGOSCOPY, GASTROSCOPY, DUODENOSCOPY (EGD), COMBINED N/A 10/29/2023    Procedure: ESOPHAGOGASTRODUODENOSCOPY, WITH FOREIGN BODY REMOVAL;  Surgeon: Kash Griffin MD;  Location:  [facility]    ESOPHAGOSCOPY, GASTROSCOPY, DUODENOSCOPY (EGD), COMBINED N/A 3/29/2024    Procedure: ESOPHAGOGASTRODUODENOSCOPY WITH BIOSPIES;  Surgeon: Gustavo Mathew MD;  Location: [facility] OR    ESOPHAGOSCOPY, GASTROSCOPY, DUODENOSCOPY (EGD), DILATATION, COMBINED N/A 8/30/2021    Procedure: ESOPHAGOGASTRODUODENOSCOPY, WITH DILATION (mngi);   Surgeon: Pat Cervantes MD;  [facility]    EXAM UNDER ANESTHESIA ANUS N/A 1/10/2017    Procedure: EXAM UNDER ANESTHESIA ANUS;  Surgeon: Annmarie Haynes MD;  [facility]    EXAM UNDER ANESTHESIA RECTUM N/A 7/19/2018    Procedure: EXAM UNDER ANESTHESIA RECTUM;  EXAM UNDER ANESTHESIA, REMOVAL OF RECTAL FOREIGN BODY;  Surgeon: Annmarie Haynes MD;  [facility]    HC REMOVE FECAL IMPACTION OR FB W ANESTHESIA N/A 12/18/2016    Procedure: REMOVE FECAL IMPACTION/FOREIGN BODY UNDER ANESTHESIA;  Surgeon: Soham Cano MD;  [facility]    IR CVC TUNNEL PLACEMENT > 5 YRS OF AGE  4/2/2024    IR CVC TUNNEL REMOVAL RIGHT  4/16/2024    KNEE SURGERY Right     KNEE SURGERY - removed a small tissue mass from knee Right     LAPAROSCOPIC ABLATION ENDOMETRIOSIS      LAPAROTOMY EXPLORATORY N/A 1/10/2017    Procedure: LAPAROTOMY EXPLORATORY;  Surgeon: Annmarie Haynes MD;  [facility]    LAPAROTOMY EXPLORATORY  09/11/2019    Manual manipulation of bowel to remove pill bottle in rectum    lymph nodes removed from neck; benign  age 7    MAMMOPLASTY REDUCTION Bilateral     OTHER SURGICAL HISTORY      foreign body anus removal    PICC DOUBLE LUMEN PLACEMENT  4/25/2024    NY ESOPHAGOGASTRODUODENOSCOPY TRANSORAL DIAGNOSTIC N/A 1/5/2019    Procedure: ESOPHAGOGASTRODUODENOSCOPY (EGD) with foreign body removal using raptor;  Surgeon: Lila Sol MD;  Location: [facility];  Service: Gastroenterology    [place] ESOPHAGOGASTRODUODENOSCOPY TRANSORAL DIAGNOSTIC N/A 1/25/2019    Procedure: ESOPHAGOGASTRODUODENOSCOPY (EGD) removal of foreign body;  Surgeon: Binu Vigil MD;  Location: [facility] OR;  Service: Gastroenterology    [place] ESOPHAGOGASTRODUODENOSCOPY TRANSORAL DIAGNOSTIC N/A 1/31/2019    Procedure: ESOPHAGOGASTRODUODENOSCOPY (EGD);  Surgeon: Siddharth Spears MD;  Location: [facility] OR;  Service: Gastroenterology    NY ESOPHAGOGASTRODUODENOSCOPY  TRANSORAL DIAGNOSTIC N/A 8/17/2019    Procedure: ESOPHAGOGASTRODUODENOSCOPY (EGD) with foreign body removal;  Surgeon: Darek Lucero MD;  Location: [facility];  Service: Gastroenterology    MS ESOPHAGOGASTRODUODENOSCOPY TRANSORAL DIAGNOSTIC N/A 9/29/2019    Procedure: ESOPHAGOGASTRODUODENOSCOPY (EGD) with foreign body removal;  Surgeon: Bailey Arnold MD;  Location: [facility];  Service: Gastroenterology    MS ESOPHAGOGASTRODUODENOSCOPY TRANSORAL DIAGNOSTIC N/A 10/3/2019    Procedure: ESOPHAGOGASTRODUODENOSCOPY (EGD), REMOVAL OF FOREIGN BODY;  Surgeon: Chris Lira MD;  Location: [facility];  Service: Gastroenterology    MS ESOPHAGOGASTRODUODENOSCOPY TRANSORAL DIAGNOSTIC N/A 10/6/2019    Procedure: ESOPHAGOGASTRODUODENOSCOPY (EGD) with attempted foreign body removal;  Surgeon: Felipe Connolly MD;  Location: [facility];  Service: Gastroenterology    MS ESOPHAGOGASTRODUODENOSCOPY TRANSORAL DIAGNOSTIC N/A 12/15/2019    Procedure: ESOPHAGOGASTRODUODENOSCOPY (EGD) with foreign body removal;  Surgeon: Jeffy Zuñiga MD;  Location: [facility];  Service: Gastroenterology    MS ESOPHAGOGASTRODUODENOSCOPY TRANSORAL DIAGNOSTIC N/A 12/17/2019    Procedure: ESOPHAGOGASTRODUODENOSCOPY (EGD) with attempted foreign body removal;  Surgeon: Felipe Connolly MD;  Location: [facility];  Service: Gastroenterology    MS ESOPHAGOGASTRODUODENOSCOPY TRANSORAL DIAGNOSTIC N/A 12/21/2019    Procedure: ESOPHAGOGASTRODUODENOSCOPY (EGD) FOR FROEIGN BODY REMOVAL;  Surgeon: Binu Vigil MD;  Location: [facility];  Service: Gastroenterology    MS ESOPHAGOGASTRODUODENOSCOPY TRANSORAL DIAGNOSTIC N/A 7/22/2020    Procedure: ESOPHAGOGASTRODUODENOSCOPY (EGD);  Surgeon: Bailey Arnold MD;  Location: [facility];  Service: Gastroenterology    MS ESOPHAGOGASTRODUODENOSCOPY TRANSORAL DIAGNOSTIC N/A 8/14/2020    Procedure: ESOPHAGOGASTRODUODENOSCOPY (EGD) FOREIGN BODY  REMOVAL;  Surgeon: Jeffy Zuñiga MD;  Location: [facility];  Service: Gastroenterology    MS ESOPHAGOGASTRODUODENOSCOPY TRANSORAL DIAGNOSTIC N/A 2/25/2021    Procedure: ESOPHAGOGASTRODUODENOSCOPY (EGD) with foreign body reoval;  Surgeon: Bird Sethi MD;  Location: [facility];  Service: Gastroenterology    MS ESOPHAGOGASTRODUODENOSCOPY TRANSORAL DIAGNOSTIC N/A 4/19/2021    Procedure: ESOPHAGOGASTRODUODENOSCOPY (EGD);  Surgeon: Libia Rose MD;  Location: [facility] OR;  Service: Gastroenterology    MS SURG DIAGNOSTIC EXAM, ANORECTAL N/A 2/5/2020    Procedure: EXAM UNDER ANESTHESIA, Flexible Sigmoidoscopy, Retrieval of Foreign Body;  Surgeon: Sasha Ivan MD;  Location: [facility];  Service: General    RELEASE CARPAL TUNNEL Bilateral     RELEASE CARPAL TUNNEL Bilateral     REMOVAL, FOREIGN BODY, RECTUM N/A 7/21/2021    Procedure: MANUAL RETREIVALOF FOREIGN OBJECT- RECTUM.;  Surgeon: Aleksandra Gerber MD;  Location: [facility]    SIGMOIDOSCOPY FLEXIBLE N/A 1/10/2017    Procedure: SIGMOIDOSCOPY FLEXIBLE;  Surgeon: Annmarie Haynes MD;  Location:  OR    SIGMOIDOSCOPY FLEXIBLE N/A 5/8/2018    Procedure: SIGMOIDOSCOPY FLEXIBLE;  flex sig with foreign body removal using snare and rattooth forcep;  Surgeon: Soham Cano MD;  Location: [facility]    SIGMOIDOSCOPY FLEXIBLE N/A 2/20/2019    Procedure: Exam under anesthesia Colonoscopy with attempted  removal of rectal foreign body;  Surgeon: Estrada Chávez MD;  Location:  OR       Social History     Socioeconomic History    Marital status: Single     Spouse name: Not on file    Number of children: Not on file    Years of education: Not on file    Highest education level: Not on file   Occupational History    Occupation: On disability   Tobacco Use    Smoking status: Never    Smokeless tobacco: Never   Vaping Use    Vaping status: Not on file   Substance and Sexual Activity    Alcohol use: No     Alcohol/week: 0.0 standard  drinks of alcohol    Drug use: No    Sexual activity: Not Currently     Partners: Male     Birth control/protection: I.U.D.     Comment: IUD - Mirena - placed July, 2015   Other Topics Concern    Parent/sibling w/ CABG, MI or angioplasty before 65F 55M? Not Asked   Social History Narrative    Single.    Living in independent living portion of [employer].    On disability.    No regular exercise.      Social Determinants of Health     Financial Resource Strain: Low Risk  (6/16/2023)    [facility]    Financial Resource Strain     Difficulty of Paying Living Expenses: 3     Difficulty of Paying Living Expenses: Not on file   Food Insecurity: No Food Insecurity (6/16/2023)    [facility]    Food Insecurity     Worried About Running Out of Food in the Last Year: 1   Transportation Needs: No Transportation Needs (6/16/2023)    [facility]    Transportation Needs     Lack of Transportation (Medical): 1   Physical Activity: Not on file   Stress: Not on file   Social Connections: Socially Integrated (6/16/2023)    [facility]    Social Connections     Frequency of Communication with Friends and Family: 0   Interpersonal Safety: Unknown (4/27/2024)    Received from [facility]    Humiliation, Afraid, Rape, and Kick questionnaire     Fear of Current or Ex-Partner: Not on file     Emotionally Abused: Not on file     Physically Abused: No     Sexually Abused: No   Housing Stability: Low Risk  (6/16/2023)    [facility]    Housing Stability     Unable to Pay  for Housing in the Last Year: 1       Family History   Problem Relation Age of Onset    Diabetes Type 2  Maternal Grandmother     Diabetes Type 2  Paternal Grandmother     Breast Cancer Paternal Grandmother     Cerebrovascular Disease Father 53    Myocardial Infarction No family hx of     Coronary Artery Disease Early Onset No family hx of     Ovarian Cancer No family hx of     Colon Cancer No family hx of     Depression Mother     Anxiety Disorder Mother      Family history reviewed and edited as appropriate    Medications and Allergies:     Outpatient Encounter Medications as of 5/8/2024   Medication Sig Dispense Refill    acetaminophen (TYLENOL) 500 MG tablet Take 2 tablets (1,000 mg) by mouth every 6 hours as needed for pain or fever 60 tablet 0    albuterol (PROAIR HFA/PROVENTIL HFA/VENTOLIN HFA) 108 (90 Base) MCG/ACT inhaler Inhale 2 puffs into the lungs every 6 hours as needed for shortness of breath / dyspnea or wheezing 18 g 0    albuterol (PROVENTIL) (2.5 MG/3ML) 0.083% neb solution Take 1 vial (2.5 mg) by nebulization every 6 hours as needed for shortness of breath or wheezing 90 mL 0    Apixaban Starter Pack (ELIQUIS DVT/PE STARTER PACK) 5 MG TBPK Take 2 tablets (10 mg) by mouth twice daily for 7 days then take 1 tablet (5 mg) twice daily thereafter      benzonatate (TESSALON) 100 MG capsule Take 1 capsule (100 mg) by mouth 3 times daily as needed for cough 12 capsule 0    cetirizine (ZYRTEC) 10 MG tablet Take 1 tablet (10 mg) by mouth daily 30 tablet 0    Cholecalciferol (D3 HIGH POTENCY) 25 MCG (1000 UT) CAPS Take 50 mcg by mouth daily      clonazePAM (KLONOPIN) 0.5 MG tablet Take 1 tablet (0.5 mg) by mouth daily as needed for anxiety 7 tablet 0    cyclobenzaprine (FLEXERIL) 10 MG tablet Take 1 tablet (10 mg) by mouth 3 times daily as needed for muscle spasms 20 tablet 0    escitalopram (LEXAPRO) 10 MG tablet Take 10 mg by mouth      ferrous sulfate (FEROSUL) 325 (65 Fe) MG tablet Take 1 tablet (325  mg) by mouth daily (with breakfast) 30 tablet 0    hydrOXYzine HCl (ATARAX) 25 MG tablet Take 25 mg by mouth      montelukast (SINGULAIR) 10 MG tablet Take 10 mg by mouth every evening      norethindrone (AYGESTIN) 5 MG tablet Take 5 mg by mouth daily      ondansetron (ZOFRAN-ODT) 4 MG ODT tab Take 1 tablet (4 mg) by mouth every 8 hours as needed for nausea 15 tablet 0    pantoprazole (PROTONIX) 40 MG EC tablet Take 40 mg by mouth daily      polyethylene glycol (MIRALAX) 17 GM/Dose powder Take 17 g by mouth daily as needed for constipation      PSYLLIUM PO Take 1 tsp by mouth daily      Respiratory Therapy Supplies (NEBULIZER) BRENDAN Nebulizer device.  Albuterol nebulization every 2 hours as needed for shortness of breath or wheezing. 1 each 0    Semaglutide, 1 MG/DOSE, (OZEMPIC) 4 MG/3ML pen Inject 1 mg Subcutaneous every 7 days 3 mL 1    Semaglutide-Weight Management (WEGOVY) 1 MG/0.5ML pen Inject 1 mg Subcutaneous once a week (Patient not taking: Reported on 5/8/2024) 2 mL 3    valACYclovir (VALTREX) 1000 mg tablet Take 2,000 mg by mouth 2 times daily as needed Take 2 tablets by mouth two times daily for one day. Use as needed at onset of cold sore.       No facility-administered encounter medications on file as of 5/8/2024.        Allergies   Allergen Reactions    Amoxicillin-Pot Clavulanate Other (See Comments), Swelling and Rash     PN: facial swelling, left side. Also had cortisone injection the same day as she started the Augmentin.          Influenza Vaccines Other (See Comments) and Nausea and Vomiting     HUT Reaction: Nausea And Vomiting; HUT Reaction Type: Intolerance; HUT Severity: Low; [facility] Noted: [id number]    Latex Rash           Oseltamivir Hives    Penicillins Anaphylaxis    Vancomycin Itching, Swelling and Rash     Flushed face, very itchy    Hydrocodone Nausea and Vomiting and GI Disturbance     vomiting for days    Blood-Group Specific Substance Other (See Comments)     Patient has an anti-Cw and  non-specific antibodies. Blood product orders may be delayed. Draw one red top and two purple top tubes for all type/screen/crossmatch orders.  Patient has anti-Cw, anti-K (Angella), Warm auto and nonspecific antibodies. Blood products may be delayed. Draw patient 24 hours prior to transfusion. Draw one red top and two purple top tubes for all type and screen orders.    Clavulanic Acid Angioedema    Haemophilus B Polysaccharide Vaccine Nausea and Vomiting    Oxycodone Swelling    Adhesive Tape Rash     Silicone type  Adhesive allergy      Band-Aid Anti-Itch      Other reaction(s): adhesive allergy    Cephalosporins Rash    Lamotrigine Rash     Possibly associated with Lamictal.     Naltrexone Other (See Comments)     Reaction(s): Vivid dreams.    No Clinical Screening - See Comments Other (See Comments)     History of swallowing sharp metallic objects. She should not be prescribed lancets due to posed risk of swallowing.         Review of systems:  A full 10 point review of systems was obtained and was negative except for the pertinent positives and negatives stated within the HPI.    Objective Findings:   Physical Exam:    Constitutional: [facility] 07/12/2023   General: Alert, oriented.   Head: Atraumatic, normocephalic, no obvious abnormalities   Eyes: Sclera anicteric, no obvious conjunctival hemorrhage   Nose: Nares normal, no obvious malformation, no obvious rhinorrhea   Respiratory: Normal appearing respirations, no cough, no apparent distress  Musculoskeletal: Range of motion intact, no obvious strength deficit  Skin: No jaundice, no obvious rash  Neurologic: AAOx3, no obvious neurologic abnormality.   Psychiatric: Normal Affect, appropriate mood  Extremities: No obvious edema, no obvious malformation     Labs, Radiology, Pathology     Lab Results   Component Value Date    WBC 8.7 04/25/2024    WBC 7.4 04/21/2024    WBC 8.3 04/19/2024    HGB 13.6 04/25/2024    HGB 15.5 04/21/2024    HGB 12.2 04/19/2024      04/25/2024     04/21/2024     04/19/2024    CHOL 132 02/11/2022    CHOL 130 11/30/2020    CHOL 132 03/21/2018    TRIG 266 (H) 02/11/2022    TRIG 182 (H) 11/30/2020    TRIG 125 03/21/2018    HDL 41 (L) 02/11/2022    HDL 44 (L) 11/30/2020    HDL 48 (L) 03/21/2018    ALT 68 (H) 04/21/2024    ALT 14 04/07/2024    ALT 26 03/29/2024    AST 30 04/21/2024    AST 19 04/07/2024    AST 17 03/29/2024     04/25/2024     04/21/2024     04/19/2024    BUN 12.9 04/25/2024    BUN 9.8 04/21/2024    BUN 11.7 04/19/2024    CO2 24 04/25/2024    CO2 26 04/21/2024    CO2 24 04/19/2024    TSH 4.47 (H) 06/27/2023    TSH 4.94 (H) 02/11/2022    TSH 3.19 11/30/2020    INR 1.23 (H) 04/08/2024    INR 1.34 (H) 04/04/2024    INR 1.11 01/15/2023        Liver Function Studies -   Recent Labs   Lab Test 01/05/23  1905   PROTTOTAL 6.6   ALBUMIN 3.6   BILITOTAL 0.2   ALKPHOS 70   AST 24   ALT 30          Patient Active Problem List    Diagnosis Date Noted    CIDP (chronic inflammatory demyelinating polyneuropathy) (H) 04/01/2024     Priority: Medium    Bilateral leg weakness 03/30/2024     Priority: Medium    Acute midline back pain, unspecified back location 03/30/2024     Priority: Medium    Right leg paresthesias 05/24/2023     Priority: Medium    Right leg weakness 05/24/2023     Priority: Medium    Right low back pain, unspecified chronicity, unspecified whether sciatica present 05/24/2023     Priority: Medium    Foot drop, left 05/24/2023     Priority: Medium    Diarrhea, unspecified type 01/30/2023     Priority: Medium    Muscle strain of thigh, right, initial encounter 01/11/2023     Priority: Medium    Foreign body ingestion 09/16/2022     Priority: Medium    Foreign body ingestion, initial encounter 02/10/2022     Priority: Medium    Suicide gesture, subsequent encounter (H24) 11/27/2020     Priority: Medium    Gallstones 10/30/2020     Priority: Medium    RUQ abdominal pain 10/30/2020     Priority: Medium     Swallowed foreign body, initial encounter 01/12/2020     Priority: Medium    Swallowed foreign body, initial encounter 01/12/2020     Priority: Medium     Added automatically from request for surgery [id number]      Foreign body in digestive system 12/18/2019     Priority: Medium    Pulmonary embolism (H) 11/30/2019     Priority: Medium    Esophageal stricture 11/30/2019     Priority: Medium     Added automatically from request for surgery [id number]      Poor appetite 11/29/2019     Priority: Medium    High risk medication use 11/05/2019     Priority: Medium    History of posttraumatic stress disorder (PTSD) 11/05/2019     Priority: Medium    Dysphagia 11/03/2019     Priority: Medium    Esophageal perforation 10/24/2019     Priority: Medium     Added automatically from request for surgery [id number]      Esophageal tear, sequela 10/19/2019     Priority: Medium    Constipation,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

## 2024-05-08 NOTE — PROGRESS NOTES
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 for foreign body removal;  Surgeon: Lokesh Paula MD;  [facility]     ESOPHAGOSCOPY, GASTROSCOPY, DUODENOSCOPY (EGD), COMBINED N/A 1/18/2020    Procedure: Diagnostic ESOPHAGOGASTRODUODENOSCOPY (EGD;  Surgeon: Lokesh Paula MD;  [facility]     ESOPHAGOSCOPY, GASTROSCOPY, DUODENOSCOPY (EGD), COMBINED N/A 3/29/2020    Procedure: UPPER ENDOSCOPY WITH FOREIGN BODY REMOVAL;  Surgeon: Doug Hansen MD;  [facility]     ESOPHAGOSCOPY, GASTROSCOPY, DUODENOSCOPY (EGD), COMBINED N/A 7/11/2020    Procedure: ESOPHAGOGASTRODUODENOSCOPY (EGD); Upper Endoscopy WITH FOREIGN BODY REMOVAL;  Surgeon: Lyndsey Mendoza DO;  [facility]     ESOPHAGOSCOPY, GASTROSCOPY, DUODENOSCOPY (EGD), COMBINED N/A 7/29/2020    Procedure: ESOPHAGOGASTRODUODENOSCOPY REMOVAL OF FOREIGN BODY;  Surgeon: Samia Stanton MD;  [facility]     ESOPHAGOSCOPY, GASTROSCOPY, DUODENOSCOPY (EGD), COMBINED N/A 8/1/2020    Procedure: ESOPHAGOGASTRODUODENOSCOPY, WITH FOREIGN BODY REMOVAL;  Surgeon: Pamela Perez MD;   [facility]     ESOPHAGOSCOPY, GASTROSCOPY, DUODENOSCOPY (EGD), COMBINED N/A 8/18/2020    Procedure: ESOPHAGOGASTRODUODENOSCOPY (EGD) for foreign body removal;  Surgeon: Pamela Perez MD;  [facility]     ESOPHAGOSCOPY, GASTROSCOPY, DUODENOSCOPY (EGD), COMBINED N/A 8/27/2020    Procedure: ESOPHAGOGASTRODUODENOSCOPY (EGD) with foreign body removal;  Surgeon: Campbell Rogers MD;  [facility]     ESOPHAGOSCOPY, GASTROSCOPY, DUODENOSCOPY (EGD), COMBINED N/A 9/18/2020    Procedure: ESOPHAGOGASTRODUODENOSCOPY (EGD) with foreign body removal;  Surgeon: Dick Gillis MD;  [facility]     ESOPHAGOSCOPY, GASTROSCOPY, DUODENOSCOPY (EGD), COMBINED N/A 11/18/2020    Procedure: ESOPHAGOGASTRODUODENOSCOPY, WITH FOREIGN BODY REMOVAL;  Surgeon: Felipe Ulloa DO;  [facility]     ESOPHAGOSCOPY, GASTROSCOPY, DUODENOSCOPY (EGD), COMBINED N/A 11/28/2020    Procedure: ESOPHAGOGASTRODUODENOSCOPY (EGD);  Surgeon: Campbell Rogers MD;  [facility]     ESOPHAGOSCOPY, GASTROSCOPY, DUODENOSCOPY (EGD), COMBINED N/A 3/12/2021    Procedure: ESOPHAGOGASTRODUODENOSCOPY, WITH FOREIGN BODY REMOVAL using cold snare;  Surgeon: Marianna Rudolph MD;  [facility]: [facility]     ESOPHAGOSCOPY, GASTROSCOPY, DUODENOSCOPY (EGD), COMBINED N/A 12/10/2017    Procedure: ESOPHAGOGASTRODUODENOSCOPY (EGD) with foreign body removal;  Surgeon: Lila Sol MD;  Location: [facility];  Service:      ESOPHAGOSCOPY, GASTROSCOPY, DUODENOSCOPY (EGD), COMBINED N/A 2/13/2018    Procedure: ESOPHAGOGASTRODUODENOSCOPY (EGD);  Surgeon: Barney Pinto MD;  Location: [facility];  Service:      ESOPHAGOSCOPY, GASTROSCOPY, DUODENOSCOPY (EGD), COMBINED N/A 11/9/2018    Procedure: UPPER ENDOSCOPY, FOREIGN BODY REMOVAL;  Surgeon: Cristino Kelsey MD;  Location: [facility];  Service: Gastroenterology     ESOPHAGOSCOPY, GASTROSCOPY, DUODENOSCOPY (EGD), COMBINED N/A 11/17/2018    Procedure:  ESOPHAGOGASTRODUODENOSCOPY (EGD) with foreign body removal;  Surgeon: Gustavo Mathew MD;  Location: [facility];  Service: Gastroenterology     ESOPHAGOSCOPY, GASTROSCOPY, DUODENOSCOPY (EGD), COMBINED N/A 11/22/2018    Procedure: ESOPHAGOGASTRODUODENOSCOPY (EGD);  Surgeon: Binu Vigil MD;  Location: [facility];  Service: Gastroenterology     ESOPHAGOSCOPY, GASTROSCOPY, DUODENOSCOPY (EGD), COMBINED N/A 11/25/2018    Procedure: UPPER ENDOSCOPY TO REMOVE PAPER CLIPS;  Surgeon: Hira Jacobs MD;  Location: [facility];  Service: Gastroenterology     ESOPHAGOSCOPY, GASTROSCOPY, DUODENOSCOPY (EGD), COMBINED N/A 8/1/2021    Procedure: ESOPHAGOGASTRODUODENOSCOPY (EGD);  Surgeon: Binu Vigil MD;  Location: [facility]     ESOPHAGOSCOPY, GASTROSCOPY, DUODENOSCOPY (EGD), COMBINED N/A 7/31/2021    Procedure: ESOPHAGOGASTRODUODENOSCOPY (EGD);  Surgeon: Keith Quinn MD;  Location: [facility]     ESOPHAGOSCOPY, GASTROSCOPY, DUODENOSCOPY (EGD), COMBINED N/A 8/13/2021    Procedure: ESOPHAGOGASTRODUODENOSCOPY (EGD);  Surgeon: Gustavo Mathew MD;  Location: [facility]     ESOPHAGOSCOPY, GASTROSCOPY, DUODENOSCOPY (EGD), COMBINED N/A 8/13/2021    Procedure: ESOPHAGOGASTRODUODENOSCOPY (EGD) with foreign body removal;  Surgeon: Gustavo Mathew MD;  Location: [facility]     ESOPHAGOSCOPY, GASTROSCOPY, DUODENOSCOPY (EGD), COMBINED N/A 1/30/2022    Procedure: ESOPHAGOGASTRODUODENOSCOPY (EGD) FOREIGN BODY REMOVAL;  Surgeon: Bird Sethi MD;  Location: [facility]     ESOPHAGOSCOPY, GASTROSCOPY, DUODENOSCOPY (EGD), COMBINED N/A 2/3/2022    Procedure: ESOPHAGOGASTRODUODENOSCOPY (EGD), FOREIGN BODY REMOVAL;  Surgeon: Binu Vigil MD;  Location: [facility]     ESOPHAGOSCOPY, GASTROSCOPY, DUODENOSCOPY (EGD), COMBINED N/A 2/7/2022    Procedure: ESOPHAGOGASTRODUODENOSCOPY (EGD) WITH FOREIGN BODY REMOVAL;  Surgeon: Darek Mendoza MD;  Location: [facility]  OR     ESOPHAGOSCOPY, GASTROSCOPY, DUODENOSCOPY (EGD), COMBINED N/A 2/8/2022    Procedure: ESOPHAGOGASTRODUODENOSCOPY (EGD), foreign body removal;  Surgeon: Lyndsey Mendoza DO;  [facility]     ESOPHAGOSCOPY, GASTROSCOPY, DUODENOSCOPY (EGD), COMBINED N/A 2/15/2022    Procedure: UPPER ESOPHAGOGASTRODUODENOSCOPY, WITH FOREIGN BODY REMOVAL AND USE OF BLANKENSHIP;  Surgeon: Samia Stanton MD;  [facility]     ESOPHAGOSCOPY, GASTROSCOPY, DUODENOSCOPY (EGD), COMBINED N/A 7/9/2022    Procedure: ESOPHAGOGASTRODUODENOSCOPY (EGD) with foreign body extraction;  Surgeon: Felipe Ulloa DO;  [facility]     ESOPHAGOSCOPY, GASTROSCOPY, DUODENOSCOPY (EGD), COMBINED N/A 7/29/2022    Procedure: ESOPHAGOGASTRODUODENOSCOPY (EGD) WITH FOREIGN BODY REMOVAL;  Surgeon: Pamela Perez MD;  [facility]     ESOPHAGOSCOPY, GASTROSCOPY, DUODENOSCOPY (EGD), COMBINED N/A 8/6/2022    Procedure: ESOPHAGOGASTRODUODENOSCOPY, WITH FOREIGN BODY REMOVAL;  Surgeon: Bety Nova MD;  Location:  [facility]     ESOPHAGOSCOPY, GASTROSCOPY, DUODENOSCOPY (EGD), COMBINED N/A 8/13/2022    Procedure: ESOPHAGOGASTRODUODENOSCOPY, WITH FOREIGN BODY REMOVAL using raptor device;  Surgeon: Brice Ramirez MD;  Location:  [facility]     ESOPHAGOSCOPY, GASTROSCOPY, DUODENOSCOPY (EGD), COMBINED N/A 8/24/2022    Procedure: ESOPHAGOGASTRODUODENOSCOPY (EGD);  Surgeon: Jeffy Bradley MD;  [facility]     ESOPHAGOSCOPY, GASTROSCOPY, DUODENOSCOPY (EGD), COMBINED N/A 9/17/2022    Procedure: ESOPHAGOGASTRODUODENOSCOPY (EGD), Foreign Body removal;  Surgeon: Pamela Perez MD;  [facility]     ESOPHAGOSCOPY, GASTROSCOPY, DUODENOSCOPY (EGD), COMBINED N/A 9/25/2022    Procedure: ESOPHAGOGASTRODUODENOSCOPY, WITH FOREIGN BODY REMOVAL;  Surgeon: Kash Griffin MD;  [facility]     ESOPHAGOSCOPY, GASTROSCOPY, DUODENOSCOPY (EGD), COMBINED N/A 10/23/2022    Procedure: ESOPHAGOGASTRODUODENOSCOPY (EGD) FOR REMOVAL OF FOREIGN  BODY;  Surgeon: Barney Pinto MD;  Location: [facility]     ESOPHAGOSCOPY, GASTROSCOPY, DUODENOSCOPY (EGD), COMBINED N/A 11/3/2022    Procedure: ESOPHAGOGASTRODUODENOSCOPY (EGD) for foreign body removal;  Surgeon: Cruz Kumar MD;  Location: [facility]     ESOPHAGOSCOPY, GASTROSCOPY, DUODENOSCOPY (EGD), COMBINED N/A 11/29/2022    Procedure: ESOPHAGOGASTRODUODENOSCOPY (EGD);  Surgeon: Cristino Kelsey MD, MD;  [facility]     ESOPHAGOSCOPY, GASTROSCOPY, DUODENOSCOPY (EGD), COMBINED N/A 12/8/2022    Procedure: ESOPHAGOGASTRODUODENOSCOPY (EGD) with foreign body removal;  Surgeon: Efrem Begum MD;  [facility]     ESOPHAGOSCOPY, GASTROSCOPY, DUODENOSCOPY (EGD), COMBINED N/A 12/28/2022    Procedure: ESOPHAGOGASTRODUODENOSCOPY, WITH FOREIGN BODY REMOVAL;  Surgeon: Doug Hansen MD;  Location:  GI     ESOPHAGOSCOPY, GASTROSCOPY, DUODENOSCOPY (EGD), COMBINED N/A 1/20/2023    Procedure: ESOPHAGOGASTRODUODENOSCOPY (EGD);  Surgeon: Bety Nova MD;  Location: [facility]     ESOPHAGOSCOPY, GASTROSCOPY, DUODENOSCOPY (EGD), COMBINED N/A 3/11/2023    Procedure: ESOPHAGOGASTRODUODENOSCOPY WITH FOREIGN BODY REMOVAL;  Surgeon: Cruz Kumar MD;  Location: [facility]     ESOPHAGOSCOPY, GASTROSCOPY, DUODENOSCOPY (EGD), COMBINED N/A 10/16/2023    Procedure: ESOPHAGOGASTRODUODENOSCOPY (EGD) WITH FOREIGN BODY REMOVAL;  Surgeon: Cruz Kumar MD;  Location: [facility]     ESOPHAGOSCOPY, GASTROSCOPY, DUODENOSCOPY (EGD), COMBINED N/A 10/29/2023    Procedure: ESOPHAGOGASTRODUODENOSCOPY, WITH FOREIGN BODY REMOVAL;  Surgeon: Kash Griffin MD;  Location:  GI     ESOPHAGOSCOPY, GASTROSCOPY, DUODENOSCOPY (EGD), COMBINED N/A 3/29/2024    Procedure: ESOPHAGOGASTRODUODENOSCOPY WITH BIOSPIES;  Surgeon: Gustavo Mathew MD;  Location: [facility] OR     ESOPHAGOSCOPY, GASTROSCOPY, DUODENOSCOPY (EGD), DILATATION, COMBINED N/A  8/30/2021    Procedure: ESOPHAGOGASTRODUODENOSCOPY, WITH DILATION (mngi);  Surgeon: Pat Cervantes MD;  [facility]     EXAM UNDER ANESTHESIA ANUS N/A 1/10/2017    Procedure: EXAM UNDER ANESTHESIA ANUS;  Surgeon: Annmarie Haynes MD;  [facility]     EXAM UNDER ANESTHESIA RECTUM N/A 7/19/2018    Procedure: EXAM UNDER ANESTHESIA RECTUM;  EXAM UNDER ANESTHESIA, REMOVAL OF RECTAL FOREIGN BODY;  Surgeon: Annmarie Haynes MD;  [facility]     HC REMOVE FECAL IMPACTION OR FB W ANESTHESIA N/A 12/18/2016    Procedure: REMOVE FECAL IMPACTION/FOREIGN BODY UNDER ANESTHESIA;  Surgeon: Soham Cano MD;  [facility]     IR CVC TUNNEL PLACEMENT > 5 YRS OF AGE  4/2/2024     IR CVC TUNNEL REMOVAL RIGHT  4/16/2024     KNEE SURGERY Right      KNEE SURGERY - removed a small tissue mass from knee Right      LAPAROSCOPIC ABLATION ENDOMETRIOSIS       LAPAROTOMY EXPLORATORY N/A 1/10/2017    Procedure: LAPAROTOMY EXPLORATORY;  Surgeon: Annmarie Haynes MD;  [facility]     LAPAROTOMY EXPLORATORY  09/11/2019    Manual manipulation of bowel to remove pill bottle in rectum     lymph nodes removed from neck; benign  age 7     MAMMOPLASTY REDUCTION Bilateral      OTHER SURGICAL HISTORY      foreign body anus removal     PICC DOUBLE LUMEN PLACEMENT  4/25/2024     MN ESOPHAGOGASTRODUODENOSCOPY TRANSORAL DIAGNOSTIC N/A 1/5/2019    Procedure: ESOPHAGOGASTRODUODENOSCOPY (EGD) with foreign body removal using raptor;  Surgeon: Lila Sol MD;  Location: [facility];  Service: Gastroenterology     MN ESOPHAGOGASTRODUODENOSCOPY TRANSORAL DIAGNOSTIC N/A 1/25/2019    Procedure: ESOPHAGOGASTRODUODENOSCOPY (EGD) removal of foreign body;  Surgeon: Binu Vigil MD;  Location: [facility] OR;  Service: Gastroenterology     MN ESOPHAGOGASTRODUODENOSCOPY TRANSORAL DIAGNOSTIC N/A 1/31/2019    Procedure: ESOPHAGOGASTRODUODENOSCOPY (EGD);  Surgeon: Siddharth Spears MD;   Location: [facility] OR;  Service: Gastroenterology     AK ESOPHAGOGASTRODUODENOSCOPY TRANSORAL DIAGNOSTIC N/A 8/17/2019    Procedure: ESOPHAGOGASTRODUODENOSCOPY (EGD) with foreign body removal;  Surgeon: Darek Lucero MD;  Location: [facility];  Service: Gastroenterology     AK ESOPHAGOGASTRODUODENOSCOPY TRANSORAL DIAGNOSTIC N/A 9/29/2019    Procedure: ESOPHAGOGASTRODUODENOSCOPY (EGD) with foreign body removal;  Surgeon: Bailey Arnold MD;  Location: [facility];  Service: Gastroenterology     AK ESOPHAGOGASTRODUODENOSCOPY TRANSORAL DIAGNOSTIC N/A 10/3/2019    Procedure: ESOPHAGOGASTRODUODENOSCOPY (EGD), REMOVAL OF FOREIGN BODY;  Surgeon: Chris Lira MD;  Location: [facility];  Service: Gastroenterology     AK ESOPHAGOGASTRODUODENOSCOPY TRANSORAL DIAGNOSTIC N/A 10/6/2019    Procedure: ESOPHAGOGASTRODUODENOSCOPY (EGD) with attempted foreign body removal;  Surgeon: Felipe Connolly MD;  Location: [facility];  Service: Gastroenterology     AK ESOPHAGOGASTRODUODENOSCOPY TRANSORAL DIAGNOSTIC N/A 12/15/2019    Procedure: ESOPHAGOGASTRODUODENOSCOPY (EGD) with foreign body removal;  Surgeon: Jeffy Zuñiga MD;  Location: [facility];  Service: Gastroenterology     AK ESOPHAGOGASTRODUODENOSCOPY TRANSORAL DIAGNOSTIC N/A 12/17/2019    Procedure: ESOPHAGOGASTRODUODENOSCOPY (EGD) with attempted foreign body removal;  Surgeon: Felipe Connolly MD;  Location: [facility];  Service: Gastroenterology     AK ESOPHAGOGASTRODUODENOSCOPY TRANSORAL DIAGNOSTIC N/A 12/21/2019    Procedure: ESOPHAGOGASTRODUODENOSCOPY (EGD) FOR FROEIGN BODY REMOVAL;  Surgeon: Binu Vigil MD;  Location: [facility];  Service: Gastroenterology     AK ESOPHAGOGASTRODUODENOSCOPY TRANSORAL DIAGNOSTIC N/A 7/22/2020    Procedure: ESOPHAGOGASTRODUODENOSCOPY (EGD);  Surgeon: Bailey Arnold MD;  Location: [facility];  Service: Gastroenterology     AK  ESOPHAGOGASTRODUODENOSCOPY TRANSORAL DIAGNOSTIC N/A 8/14/2020    Procedure: ESOPHAGOGASTRODUODENOSCOPY (EGD) FOREIGN BODY REMOVAL;  Surgeon: Jeffy Zuñiga MD;  Location: [facility];  Service: Gastroenterology     VT ESOPHAGOGASTRODUODENOSCOPY TRANSORAL DIAGNOSTIC N/A 2/25/2021    Procedure: ESOPHAGOGASTRODUODENOSCOPY (EGD) with foreign body reoval;  Surgeon: Bird Sethi MD;  Location: [facility];  Service: Gastroenterology     VT ESOPHAGOGASTRODUODENOSCOPY TRANSORAL DIAGNOSTIC N/A 4/19/2021    Procedure: ESOPHAGOGASTRODUODENOSCOPY (EGD);  Surgeon: Libia Rose MD;  Location: [facility];  Service: Gastroenterology     VT SURG DIAGNOSTIC EXAM, ANORECTAL N/A 2/5/2020    Procedure: EXAM UNDER ANESTHESIA, Flexible Sigmoidoscopy, Retrieval of Foreign Body;  Surgeon: Sasha Ivan MD;  Location: [facility];  Service: General     RELEASE CARPAL TUNNEL Bilateral      RELEASE CARPAL TUNNEL Bilateral      REMOVAL, FOREIGN BODY, RECTUM N/A 7/21/2021    Procedure: MANUAL RETREIVALOF FOREIGN OBJECT- RECTUM.;  Surgeon: Aleksandra Gerber MD;  Location: [facility]     SIGMOIDOSCOPY FLEXIBLE N/A 1/10/2017    Procedure: SIGMOIDOSCOPY FLEXIBLE;  Surgeon: Annmarie Haynes MD;  Location:  OR     SIGMOIDOSCOPY FLEXIBLE N/A 5/8/2018    Procedure: SIGMOIDOSCOPY FLEXIBLE;  flex sig with foreign body removal using snare and rattooth forcep;  Surgeon: Soham Cano MD;  Location: [facility]     SIGMOIDOSCOPY FLEXIBLE N/A 2/20/2019    Procedure: Exam under anesthesia Colonoscopy with attempted  removal of rectal foreign body;  Surgeon: Estrada Chávez MD;  Location:  OR       Social History     Socioeconomic History     Marital status: Single     Spouse name: Not on file     Number of children: Not on file     Years of education: Not on file     Highest education level: Not on file   Occupational History     Occupation: On disability   Tobacco Use     Smoking status: Never     Smokeless  tobacco: Never   Vaping Use     Vaping status: Not on file   Substance and Sexual Activity     Alcohol use: No     Alcohol/week: 0.0 standard drinks of alcohol     Drug use: No     Sexual activity: Not Currently     Partners: Male     Birth control/protection: I.U.D.     Comment: IUD - Mirena - placed July, 2015   Other Topics Concern     Parent/sibling w/ CABG, MI or angioplasty before 65F 55M? Not Asked   Social History Narrative    Single.    Living in independent living portion of [employer].    On disability.    No regular exercise.      Social Determinants of Health     Financial Resource Strain: Low Risk  (6/16/2023)    [facility], [facility]    Financial Resource Strain      Difficulty of Paying Living Expenses: 3      Difficulty of Paying Living Expenses: Not on file   Food Insecurity: No Food Insecurity (6/16/2023)    Received from [facility], [facility]    Food Insecurity      Worried About Running Out of Food in the Last Year: 1   Transportation Needs: No Transportation Needs (6/16/2023)    Received from [facility], [facility]    Transportation Needs      Lack of Transportation (Medical): 1   Physical Activity: Not on file   Stress: Not on file   Social Connections: Socially Integrated (6/16/2023)    [facility], [facility]    Social Connections      Frequency of Communication with Friends and Family: 0   Interpersonal Safety: Unknown (4/27/2024)    Received from [facility]    Humiliation, Afraid, Rape, and Kick questionnaire      Fear of Current or Ex-Partner: Not on file      Emotionally Abused: Not on file      Physically Abused: No      Sexually Abused: No   Housing Stability: Low Risk   (6/16/2023)    Received from [facility], [facility]    Housing Stability      Unable to Pay for Housing in the Last Year: 1       Family History   Problem Relation Age of Onset     Diabetes Type 2  Maternal Grandmother      Diabetes Type 2  Paternal Grandmother      Breast Cancer Paternal Grandmother      Cerebrovascular Disease Father 53     Myocardial Infarction No family hx of      Coronary Artery Disease Early Onset No family hx of      Ovarian Cancer No family hx of      Colon Cancer No family hx of      Depression Mother      Anxiety Disorder Mother      Family history reviewed and edited as appropriate    Medications and Allergies:     Outpatient Encounter Medications as of 5/8/2024   Medication Sig Dispense Refill     acetaminophen (TYLENOL) 500 MG tablet Take 2 tablets (1,000 mg) by mouth every 6 hours as needed for pain or fever 60 tablet 0     albuterol (PROAIR HFA/PROVENTIL HFA/VENTOLIN HFA) 108 (90 Base) MCG/ACT inhaler Inhale 2 puffs into the lungs every 6 hours as needed for shortness of breath / dyspnea or wheezing 18 g 0     albuterol (PROVENTIL) (2.5 MG/3ML) 0.083% neb solution Take 1 vial (2.5 mg) by nebulization every 6 hours as needed for shortness of breath or wheezing 90 mL 0     Apixaban Starter Pack (ELIQUIS DVT/PE STARTER PACK) 5 MG TBPK Take 2 tablets (10 mg) by mouth twice daily for 7 days then take 1 tablet (5 mg) twice daily thereafter       benzonatate (TESSALON) 100 MG capsule Take 1 capsule (100 mg) by mouth 3 times daily as needed for cough 12 capsule 0     cetirizine (ZYRTEC) 10 MG tablet Take 1 tablet (10 mg) by mouth daily 30 tablet 0     Cholecalciferol (D3 HIGH POTENCY) 25 MCG (1000 UT) CAPS Take 50 mcg by mouth daily       clonazePAM (KLONOPIN) 0.5 MG tablet Take 1 tablet (0.5 mg) by mouth daily as needed for anxiety 7 tablet 0     cyclobenzaprine (FLEXERIL) 10 MG tablet Take 1 tablet (10 mg) by mouth 3 times  daily as needed for muscle spasms 20 tablet 0     escitalopram (LEXAPRO) 10 MG tablet Take 10 mg by mouth       ferrous sulfate (FEROSUL) 325 (65 Fe) MG tablet Take 1 tablet (325 mg) by mouth daily (with breakfast) 30 tablet 0     hydrOXYzine HCl (ATARAX) 25 MG tablet Take 25 mg by mouth       montelukast (SINGULAIR) 10 MG tablet Take 10 mg by mouth every evening       norethindrone (AYGESTIN) 5 MG tablet Take 5 mg by mouth daily       ondansetron (ZOFRAN-ODT) 4 MG ODT tab Take 1 tablet (4 mg) by mouth every 8 hours as needed for nausea 15 tablet 0     pantoprazole (PROTONIX) 40 MG EC tablet Take 40 mg by mouth daily       polyethylene glycol (MIRALAX) 17 GM/Dose powder Take 17 g by mouth daily as needed for constipation       PSYLLIUM PO Take 1 tsp by mouth daily       Respiratory Therapy Supplies (NEBULIZER) BRENDAN Nebulizer device.  Albuterol nebulization every 2 hours as needed for shortness of breath or wheezing. 1 each 0     Semaglutide, 1 MG/DOSE, (OZEMPIC) 4 MG/3ML pen Inject 1 mg Subcutaneous every 7 days 3 mL 1     Semaglutide-Weight Management (WEGOVY) 1 MG/0.5ML pen Inject 1 mg Subcutaneous once a week (Patient not taking: Reported on 5/8/2024) 2 mL 3     valACYclovir (VALTREX) 1000 mg tablet Take 2,000 mg by mouth 2 times daily as needed Take 2 tablets by mouth two times daily for one day. Use as needed at onset of cold sore.       No facility-administered encounter medications on file as of 5/8/2024.        Allergies   Allergen Reactions     Amoxicillin-Pot Clavulanate Other (See Comments), Swelling and Rash     PN: facial swelling, left side. Also had cortisone injection the same day as she started the Augmentin.           Influenza Vaccines Other (See Comments) and Nausea and Vomiting     HUT Reaction: Nausea And Vomiting; HUT Reaction Type: Intolerance; HUT Severity: Low; HUT Noted: [id number]     Latex Rash            Oseltamivir Hives     Penicillins Anaphylaxis     Vancomycin Itching, Swelling and  Rash     Flushed face, very itchy     Hydrocodone Nausea and Vomiting and GI Disturbance     vomiting for days     Blood-Group Specific Substance Other (See Comments)     Patient has an anti-Cw and non-specific antibodies. Blood product orders may be delayed. Draw one red top and two purple top tubes for all type/screen/crossmatch orders.  Patient has anti-Cw, anti-K ([place]), Warm auto and nonspecific antibodies. Blood products may be delayed. Draw patient 24 hours prior to transfusion. Draw one red top and two purple top tubes for all type and screen orders.     Clavulanic Acid Angioedema     Haemophilus B Polysaccharide Vaccine Nausea and Vomiting     Oxycodone Swelling     Adhesive Tape Rash     Silicone type  Adhesive allergy       Band-Aid Anti-Itch      Other reaction(s): adhesive allergy     Cephalosporins Rash     Lamotrigine Rash     Possibly associated with Lamictal.      Naltrexone Other (See Comments)     Reaction(s): Vivid dreams.     No Clinical Screening - See Comments Other (See Comments)     History of swallowing sharp metallic objects. She should not be prescribed lancets due to posed risk of swallowing.         Review of systems:  A full 10 point review of systems was obtained and was negative except for the pertinent positives and negatives stated within the HPI.    Objective Findings:   Physical Exam:    Constitutional: LMP 07/12/2023   General: Alert, oriented.   Head: Atraumatic, normocephalic, no obvious abnormalities   Eyes: Sclera anicteric, no obvious conjunctival hemorrhage   Nose: Nares normal, no obvious malformation, no obvious rhinorrhea   Respiratory: Normal appearing respirations, no cough, no apparent distress  Musculoskeletal: Range of motion intact, no obvious strength deficit  Skin: No jaundice, no obvious rash  Neurologic: AAOx3, no obvious neurologic abnormality.   Psychiatric: Normal Affect, appropriate mood  Extremities: No obvious edema, no obvious malformation     Labs,  Radiology, Pathology     Lab Results   Component Value Date    WBC 8.7 04/25/2024    WBC 7.4 04/21/2024    WBC 8.3 04/19/2024    HGB 13.6 04/25/2024    HGB 15.5 04/21/2024    HGB 12.2 04/19/2024     04/25/2024     04/21/2024     04/19/2024    CHOL 132 02/11/2022    CHOL 130 11/30/2020    CHOL 132 03/21/2018    TRIG 266 (H) 02/11/2022    TRIG 182 (H) 11/30/2020    TRIG 125 03/21/2018    HDL 41 (L) 02/11/2022    HDL 44 (L) 11/30/2020    HDL 48 (L) 03/21/2018    ALT 68 (H) 04/21/2024    ALT 14 04/07/2024    ALT 26 03/29/2024    AST 30 04/21/2024    AST 19 04/07/2024    AST 17 03/29/2024     04/25/2024     04/21/2024     04/19/2024    BUN 12.9 04/25/2024    BUN 9.8 04/21/2024    BUN 11.7 04/19/2024    CO2 24 04/25/2024    CO2 26 04/21/2024    CO2 24 04/19/2024    TSH 4.47 (H) 06/27/2023    TSH 4.94 (H) 02/11/2022    TSH 3.19 11/30/2020    INR 1.23 (H) 04/08/2024    INR 1.34 (H) 04/04/2024    INR 1.11 01/15/2023        Liver Function Studies -   Recent Labs   Lab Test 01/05/23  1905   PROTTOTAL 6.6   ALBUMIN 3.6   BILITOTAL 0.2   ALKPHOS 70   AST 24   ALT 30          Patient Active Problem List    Diagnosis Date Noted     CIDP (chronic inflammatory demyelinating polyneuropathy) (H) 04/01/2024     Priority: Medium     Bilateral leg weakness 03/30/2024     Priority: Medium     Acute midline back pain, unspecified back location 03/30/2024     Priority: Medium     Right leg paresthesias 05/24/2023     Priority: Medium     Right leg weakness 05/24/2023     Priority: Medium     Right low back pain, unspecified chronicity, unspecified whether sciatica present 05/24/2023     Priority: Medium     Foot drop, left 05/24/2023     Priority: Medium     Diarrhea, unspecified type 01/30/2023     Priority: Medium     Muscle strain of thigh, right, initial encounter 01/11/2023     Priority: Medium     Foreign body ingestion 09/16/2022     Priority: Medium     Foreign body ingestion, initial encounter  02/10/2022     Priority: Medium     Suicide gesture, subsequent encounter (H24) 11/27/2020     Priority: Medium     Gallstones 10/30/2020     Priority: Medium     RUQ abdominal pain 10/30/2020     Priority: Medium     Swallowed foreign body, initial encounter 01/12/2020     Priority: Medium     Swallowed foreign body, initial encounter 01/12/2020     Priority: Medium     Added automatically from request for surgery [id number]       Foreign body in digestive system 12/18/2019     Priority: Medium     Pulmonary embolism (H) 11/30/2019     Priority: Medium     Esophageal stricture 11/30/2019     Priority: Medium     Added automatically from request for surgery [id number]       Poor appetite 11/29/2019     Priority: Medium     High risk medication use 11/05/2019     Priority: Medium     History of posttraumatic stress disorder (PTSD)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

## 2024-05-08 NOTE — NURSING NOTE
Is the patient currently in the state of [place]? YES    Visit mode:VIDEO    If the visit is dropped, the patient can be reconnected by: VIDEO VISIT: Send to e-mail at: [email or web address]    Will anyone else be joining the visit? NO  (If patient encounters technical issues they should call [phone]    How would you like to obtain your AVS? MyChart    Are changes needed to the allergy or medication list? No    Are refills needed on medications prescribed by this physician? NO    Reason for visit: RECHECK    [place] ANTONIO

## 2024-05-09 LAB
BASOPHILS # BLD AUTO: 0 10E3/UL (ref 0–0.2)
BASOPHILS NFR BLD AUTO: 0 %
EOSINOPHIL # BLD AUTO: 0.2 10E3/UL (ref 0–0.7)
EOSINOPHIL NFR BLD AUTO: 2 %
ERYTHROCYTE [DISTWIDTH] IN BLOOD BY AUTOMATED COUNT: 14 % (ref 10–15)
HCT VFR BLD AUTO: 38.1 % (ref 35–47)
HGB BLD-MCNC: 12.5 G/DL (ref 11.7–15.7)
IMM GRANULOCYTES # BLD: 0 10E3/UL
IMM GRANULOCYTES NFR BLD: 0 %
LYMPHOCYTES # BLD AUTO: 2.2 10E3/UL (ref 0.8–5.3)
LYMPHOCYTES NFR BLD AUTO: 27 %
MCH RBC QN AUTO: 30 PG (ref 26.5–33)
MCHC RBC AUTO-ENTMCNC: 32.8 G/DL (ref 31.5–36.5)
MCV RBC AUTO: 92 FL (ref 78–100)
MONOCYTES # BLD AUTO: 0.4 10E3/UL (ref 0–1.3)
MONOCYTES NFR BLD AUTO: 5 %
NEUTROPHILS # BLD AUTO: 5.3 10E3/UL (ref 1.6–8.3)
NEUTROPHILS NFR BLD AUTO: 66 %
NRBC # BLD AUTO: 0 10E3/UL
NRBC BLD AUTO-RTO: 0 /100
PLATELET # BLD AUTO: 286 10E3/UL (ref 150–450)
RBC # BLD AUTO: 4.16 10E6/UL (ref 3.8–5.2)
WBC # BLD AUTO: 8.2 10E3/UL (ref 4–11)

## 2024-05-09 PROCEDURE — 85025 COMPLETE CBC W/AUTO DIFF WBC: CPT | Performed by: EMERGENCY MEDICINE

## 2024-05-09 PROCEDURE — 80048 BASIC METABOLIC PNL TOTAL CA: CPT | Performed by: EMERGENCY MEDICINE

## 2024-05-09 PROCEDURE — 36415 COLL VENOUS BLD VENIPUNCTURE: CPT | Performed by: EMERGENCY MEDICINE

## 2024-05-09 PROCEDURE — 99285 EMERGENCY DEPT VISIT HI MDM: CPT | Mod: 25

## 2024-05-10 ENCOUNTER — HOSPITAL ENCOUNTER (EMERGENCY)
Facility: CLINIC | Age: 34
Discharge: HOME | End: 2024-05-10
Attending: EMERGENCY MEDICINE | Admitting: EMERGENCY MEDICINE
Payer: COMMERCIAL

## 2024-05-10 ENCOUNTER — APPOINTMENT (OUTPATIENT)
Dept: MRI IMAGING | Facility: CLINIC | Age: 34
End: 2024-05-10
Attending: EMERGENCY MEDICINE
Payer: COMMERCIAL

## 2024-05-10 VITALS
BODY MASS INDEX: 45.64 KG/M2 | RESPIRATION RATE: 16 BRPM | DIASTOLIC BLOOD PRESSURE: 88 MMHG | TEMPERATURE: 99.3 F | SYSTOLIC BLOOD PRESSURE: 134 MMHG | HEART RATE: 81 BPM | OXYGEN SATURATION: 97 % | WEIGHT: 248 LBS | HEIGHT: 62 IN

## 2024-05-10 DIAGNOSIS — M54.2 NECK PAIN: ICD-10-CM

## 2024-05-10 DIAGNOSIS — M50.30 BULGE OF CERVICAL DISC WITHOUT MYELOPATHY: ICD-10-CM

## 2024-05-10 DIAGNOSIS — M54.6 ACUTE MIDLINE THORACIC BACK PAIN: ICD-10-CM

## 2024-05-10 LAB
ANION GAP SERPL CALCULATED.3IONS-SCNC: 12 MMOL/L (ref 7–15)
BASE EXCESS BLDV CALC-SCNC: -3 MMOL/L (ref -3–3)
BUN SERPL-MCNC: 8.3 MG/DL (ref 6–20)
CALCIUM SERPL-MCNC: 8.3 MG/DL (ref 8.6–10)
CHLORIDE SERPL-SCNC: 111 MMOL/L (ref 98–107)
CREAT SERPL-MCNC: 0.5 MG/DL (ref 0.51–0.95)
DEPRECATED HCO3 PLAS-SCNC: 23 MMOL/L (ref 22–29)
EGFRCR SERPLBLD CKD-EPI 2021: >90 ML/MIN/1.73M2
GLUCOSE SERPL-MCNC: 111 MG/DL (ref 70–99)
HCO3 BLDV-SCNC: 22 MMOL/L (ref 21–28)
LACTATE BLD-SCNC: 0.6 MMOL/L
PCO2 BLDV: 43 MM HG (ref 40–50)
PH BLDV: 7.33 [PH] (ref 7.32–7.43)
PO2 BLDV: 37 MM HG (ref 25–47)
POTASSIUM SERPL-SCNC: 3.3 MMOL/L (ref 3.4–5.3)
SAO2 % BLDV: 66 % (ref 70–75)
SODIUM SERPL-SCNC: 146 MMOL/L (ref 135–145)

## 2024-05-10 PROCEDURE — A9585 GADOBUTROL INJECTION: HCPCS | Performed by: EMERGENCY MEDICINE

## 2024-05-10 PROCEDURE — 255N000002 HC RX 255 OP 636: Performed by: EMERGENCY MEDICINE

## 2024-05-10 PROCEDURE — 96375 TX/PRO/DX INJ NEW DRUG ADDON: CPT | Mod: 59

## 2024-05-10 PROCEDURE — 258N000003 HC RX IP 258 OP 636: Performed by: EMERGENCY MEDICINE

## 2024-05-10 PROCEDURE — 82803 BLOOD GASES ANY COMBINATION: CPT

## 2024-05-10 PROCEDURE — 250N000013 HC RX MED GY IP 250 OP 250 PS 637: Performed by: EMERGENCY MEDICINE

## 2024-05-10 PROCEDURE — 72156 MRI NECK SPINE W/O & W/DYE: CPT

## 2024-05-10 PROCEDURE — 72157 MRI CHEST SPINE W/O & W/DYE: CPT

## 2024-05-10 PROCEDURE — 96374 THER/PROPH/DIAG INJ IV PUSH: CPT | Mod: 59

## 2024-05-10 PROCEDURE — 96361 HYDRATE IV INFUSION ADD-ON: CPT

## 2024-05-10 PROCEDURE — 250N000011 HC RX IP 250 OP 636: Performed by: EMERGENCY MEDICINE

## 2024-05-10 RX ORDER — ONDANSETRON 2 MG/ML
4 INJECTION INTRAMUSCULAR; INTRAVENOUS ONCE
Status: COMPLETED | OUTPATIENT
Start: 2024-05-10 | End: 2024-05-10

## 2024-05-10 RX ORDER — GADOBUTROL 604.72 MG/ML
11 INJECTION INTRAVENOUS ONCE
Status: COMPLETED | OUTPATIENT
Start: 2024-05-10 | End: 2024-05-10

## 2024-05-10 RX ORDER — FENTANYL CITRATE 50 UG/ML
50 INJECTION, SOLUTION INTRAMUSCULAR; INTRAVENOUS ONCE
Status: COMPLETED | OUTPATIENT
Start: 2024-05-10 | End: 2024-05-10

## 2024-05-10 RX ORDER — HYDROMORPHONE HYDROCHLORIDE 1 MG/ML
0.5 INJECTION, SOLUTION INTRAMUSCULAR; INTRAVENOUS; SUBCUTANEOUS ONCE
Status: COMPLETED | OUTPATIENT
Start: 2024-05-10 | End: 2024-05-10

## 2024-05-10 RX ORDER — PREDNISONE 20 MG/1
TABLET ORAL
Qty: 10 TABLET | Refills: 0 | Status: SHIPPED | OUTPATIENT
Start: 2024-05-10 | End: 2024-06-06

## 2024-05-10 RX ORDER — POTASSIUM CHLORIDE 1500 MG/1
20 TABLET, EXTENDED RELEASE ORAL ONCE
Status: COMPLETED | OUTPATIENT
Start: 2024-05-10 | End: 2024-05-10

## 2024-05-10 RX ADMIN — POTASSIUM CHLORIDE 20 MEQ: 1500 TABLET, EXTENDED RELEASE ORAL at 05:49

## 2024-05-10 RX ADMIN — GADOBUTROL 11 ML: 604.72 INJECTION INTRAVENOUS at 10:13

## 2024-05-10 RX ADMIN — SODIUM CHLORIDE 1000 ML: 9 INJECTION, SOLUTION INTRAVENOUS at 05:49

## 2024-05-10 RX ADMIN — HYDROMORPHONE HYDROCHLORIDE 1 MG: 2 TABLET ORAL at 10:33

## 2024-05-10 RX ADMIN — FENTANYL CITRATE 50 MCG: 50 INJECTION, SOLUTION INTRAMUSCULAR; INTRAVENOUS at 06:48

## 2024-05-10 RX ADMIN — HYDROMORPHONE HYDROCHLORIDE 0.5 MG: 1 INJECTION, SOLUTION INTRAMUSCULAR; INTRAVENOUS; SUBCUTANEOUS at 05:46

## 2024-05-10 RX ADMIN — ONDANSETRON 4 MG: 2 INJECTION INTRAMUSCULAR; INTRAVENOUS at 05:44

## 2024-05-10 ASSESSMENT — ACTIVITIES OF DAILY LIVING (ADL)
ADLS_ACUITY_SCORE: 43

## 2024-05-10 NOTE — ED NOTES
Patient was able to ambulate to the commode and wipes placed near her. Patient was informed that if she needs help getting up to use the call light, patient requested that her clothes be put on the bed so she can chnge.

## 2024-05-10 NOTE — ED TRIAGE NOTES
Hx neuropathy.  Adm to [facility] X 3 weeks recently for neuropathy, states lost movement in legs before her admission..  Has stiff neck X 5 days and L ear pain since yesterday.  Rash on chin and under eye new over the weekend.  States has bodyaches, joint pain, headaches x 2 weeks.  Pt states these are all new sx.  Tylenol, muscle relaxants, increased fluid (Per PMD suggestion) and food intake not helping.  Per EMS: 500cc IVF, Fentanyl 100mg, Zofran 4 mg given.   Triage Assessment (Adult)       Row Name 05/09/24 7179          Triage Assessment    Airway WDL WDL        Respiratory WDL    Respiratory WDL WDL        Skin Circulation/Temperature WDL    Skin Circulation/Temperature WDL X  states has rash under R eye and L chin        Cardiac WDL    Cardiac WDL WDL        Peripheral/Neurovascular WDL    Peripheral Neurovascular WDL WDL        Cognitive/Neuro/Behavioral WDL    Cognitive/Neuro/Behavioral WDL WDL

## 2024-05-10 NOTE — ED NOTES
MD in room, patient again on call light once the MD stepped out, asking to talk with the MD again.

## 2024-05-10 NOTE — ED NOTES
IV was removed, coban placed per patient request. Patient was then informed that the side rail is down for her to get changed and or go to the bathroom. The patient verbalized understanding.

## 2024-05-10 NOTE — ED NOTES
"In triage, pt now c/o bilateral \"blurry vision, like there's a cloud around my eye\". Pt reports increased neck pain. Pt reports \"the fentanyl the paramedics gave me is wearing off\".  "

## 2024-05-10 NOTE — ED NOTES
Patient asking for more pain medication for her upper back and neck area pain. Notified MD who will speak to patient when finished with current procedure in another room. No additional verbal orders for pain meds at this time

## 2024-05-10 NOTE — ED PROVIDER NOTES
"  Emergency Department Note      History of Present Illness     Chief Complaint  Neck Pain    HPI  Nevin Alvarado is a 33 year old female with a history of chronic inflammatory demyelinating polyradiculoneuropathy, PE anticoagulated on Eliquis who presents for evaluation of neck pain. The patient reports that she has neck pain, headaches, left inner ear and back pain. She has had body aches without fever this past week. She describes the pain as \"electrical shocks shooting up my head\".  She has been taking Tylenol and muscle relaxants.  She was also prescribed oral Dilaudid yesterday but has not taken it.    Independent Historian  None    Review of External Notes  Reviewed patient's care plan.  Discouraged IV pain medicine and imaging.  Past Medical History   Medical History and Problem List  Attention deficit disorder  Anorexia nervous with bulimia  Anxiety  Asthma  Borderline personality disorder  Depression  Depressive disorder  Diabetes  Eating disorder  History of self harm  History of suicide attempt  History of pulmonary embolism  Morbid obesity  Neuropathy  PTSD  Pulmonary emboli  Rectal foreign body, recurrent issue, self placed  Self-injurious behavior  Sleep apnea  Suicidal overdose  Swallowed foreign body - recurrent issue, self placed  Syncope    Medications  Apixaban starter pack  Benzonatate  Cyclobenzaprine  Dicyclomine  Escitalopram  Hydroxyzine HCl  Montelukast  Norethindrone  Ondansetron  Pantoprazole  Semaglutide  Valacyclovir    Surgical History   EGD combined x20  Sigmoidoscopy flexible x3  Release carpal tunnel x2  Removal fecal impaction or foreign body under anesthesia  Esophagogastroduodenoscopy transoral diagnostic x10  Knee surgery x2  Mammoplasty reduction  Laparotomy exploratory x2  PICC double lumen placement      Physical Exam   Patient Vitals for the past 24 hrs:   BP Temp Temp src Pulse Resp SpO2 Height Weight   05/10/24 0830 (!) 136/92 -- -- 69 16 92 % -- --   05/10/24 0815 " "(!) 154/99 -- -- 91 -- 96 % -- --   05/10/24 0800 (!) 145/87 -- -- 81 16 95 % -- --   05/10/24 0730 102/57 -- -- 86 16 94 % -- --   05/10/24 0645 (!) 141/81 -- -- 97 -- 95 % -- --   05/10/24 0610 -- -- -- -- -- 96 % -- --   05/10/24 0345 -- -- -- -- -- 98 % -- --   05/10/24 0340 -- -- -- -- -- 98 % -- --   05/10/24 0335 (!) 142/95 -- -- 95 -- 98 % -- --   05/10/24 0044 138/76 99.3  F (37.4  C) Temporal 90 18 97 % -- --   05/09/24 2300 -- 98.2  F (36.8  C) Oral 84 16 97 % 1.575 m (5' 2\") 112.5 kg (248 lb)     Physical Exam  Gen: No distress.  Nontoxic.  Head: Atraumatic.  No hematomas, lesions, abrasions  Neck: midline tenderness of the spine.  Mouth: No oral lesions, or abrasions.  Mucous membranes are moist.  Resp: Equal breath sounds, normal respiratory effort  Cardio: Regular rate and rhythm, no murmurs  Abdomen: Soft, nontender, nondistended  MSK; no extremity swelling, bruising, or abrasions.  Neuro: Cranial nerves II through XII intact.  5 out of 5 muscle strength in the upper and lower extremities.  Sensation intact in the upper and lower extremities.  Finger-to-nose negative.  Heel-to-shin negative.  No truncal ataxia.  Psych: Appropriate affect.      Diagnostics   Lab Results   Labs Ordered and Resulted from Time of ED Arrival to Time of ED Departure   BASIC METABOLIC PANEL - Abnormal       Result Value    Sodium 146 (*)     Potassium 3.3 (*)     Chloride 111 (*)     Carbon Dioxide (CO2) 23      Anion Gap 12      Urea Nitrogen 8.3      Creatinine 0.50 (*)     GFR Estimate >90      Calcium 8.3 (*)     Glucose 111 (*)    ISTAT GASES LACTATE VENOUS POCT - Abnormal    Lactic Acid POCT 0.6      Bicarbonate Venous POCT 22      O2 Sat, Venous POCT 66 (*)     pCO2 Venous POCT 43      pH Venous POCT 7.33      pO2 Venous POCT 37      Base Excess/Deficit (+/-) POCT -3.0     CBC WITH PLATELETS AND DIFFERENTIAL    WBC Count 8.2      RBC Count 4.16      Hemoglobin 12.5      Hematocrit 38.1      MCV 92      MCH 30.0      " MCHC 32.8      RDW 14.0      Platelet Count 286      % Neutrophils 66      % Lymphocytes 27      % Monocytes 5      % Eosinophils 2      % Basophils 0      % Immature Granulocytes 0      NRBCs per 100 WBC 0      Absolute Neutrophils 5.3      Absolute Lymphocytes 2.2      Absolute Monocytes 0.4      Absolute Eosinophils 0.2      Absolute Basophils 0.0      Absolute Immature Granulocytes 0.0      Absolute NRBCs 0.0     LACTIC ACID WHOLE BLOOD       Imaging  MR Cervical Spine w/o & w Contrast   Final Result   IMPRESSION:   1. No abnormal cord signal or enhancement.   2. Compared to 4/6/2024, unchanged right eccentric disc bulge at   C5-C6, which contacts the right ventral cord.         ROSHAN CLEMENT MD            SYSTEM ID:  [id number]      MR Thoracic Spine w/o & w Contrast   Final Result   IMPRESSION:     1. No abnormal cord signal or enhancement.   2. Unchanged examination compared to 3/30/2024 with mild multilevel   thoracic spondylosis.   3. No high-grade neural foraminal narrowing or canal stenosis.         ROSHAN CLEMENT MD            SYSTEM ID:  [id number]            Independent Interpretation  None  ED Course    Medications Administered  Medications   sodium chloride 0.9% BOLUS 1,000 mL (0 mLs Intravenous Stopped 5/10/24 1111)   potassium chloride jordon ER (KLOR-CON M20) CR tablet 20 mEq (20 mEq Oral $Given 5/10/24 0549)   HYDROmorphone (PF) (DILAUDID) injection 0.5 mg (0.5 mg Intravenous $Given 5/10/24 0546)   ondansetron (ZOFRAN) injection 4 mg (4 mg Intravenous $Given 5/10/24 0544)   fentaNYL (PF) (SUBLIMAZE) injection 50 mcg (50 mcg Intravenous $Given 5/10/24 0648)   gadobutrol (GADAVIST) injection 11 mL (11 mLs Intravenous $Given 5/10/24 1013)   HYDROmorphone (DILAUDID) half-tab 1 mg (1 mg Oral $Given 5/10/24 1033)       Procedures  Procedures     Discussion of Management  None    Social Determinants of Health adding to complexity of care  None    ED Course  ED Course as of 05/10/24 1114   Fri May 10,  2024 0510 I examined the patient and obtained history.        Medical Decision Making / Diagnosis   CMS Diagnoses: None    MIPS     None    MDM  Nevin Alvarado is a 33 year old female with a history of chronic inflammatory demyelinating polyradiculoneuropathy and PE anticoagulated on Eliquis presents to the emergency department with a complaint of neck pain and upper back pain.  Patient also has multiple other complaints, including headache, body aches.  On exam patient is curled up in a ball on the bed crying.  She is moving all of her extremities.  Her sensation is not decreased from her baseline.  She is not having any loss of bowel or bladder control.  She has not had any falls or trauma.  Patient does have tenderness to palpation of the cervical and thoracic spine.  Patient is given multiple doses of pain medicine without any improvement in her pain.  She is crying on the bed and inconsolable.  Although she does have a care plan but discourages from pain medicine, at this point these are new diagnoses since her care plan was updated in 2023.   Workup is reassuring, no elevated white blood cell count, her lactic acid is within acceptable limits.  Potassium is a little bit low.  She is given a liter of fluid.  Also Zofran for nausea.  She is given a dose of IV pain medicine.  Vital signs are within acceptable limits, patient is not febrile, tachycardic, or hypotensive.   On extensive look through her chart, she does have an appointment with neurology next week.  She has had chronic pain, as well as body aches.  She was seen for neck pain yesterday, and did receive a prescription for oral Dilaudid.  She was admitted on 4/1/2024 for lower back pain and lower extremity paralysis and a CIDP flare at the beginning of the month.  Patient denies any current low back pain.  There was concern for history of drug-seeking behavior.  I did have a discussion with the patient, and she did receive IV pain medicine, and  once reading through her chart, now she will be receiving only oral pain medicine.  She is upset.  She is moving her neck and head around with ease, no stiffness or pain.  Patient presses the call late requesting to speak with the physician several times.  MRIs do not show any acute findings, unchanged from MRIs done earlier this month.  I have low suspicion for infection, or any surgical spine issues.  Patient is offered admission for pain control.  She refuses.  I will give her prescription for prednisone and refer her to neurosurgery for her C5-C6 disc bulge.  She also reports that she has an appointment with pain management in June.  I did advise the patient I would not be prescribing any narcotic pain medicine.  She is agreeable with this plan.  Patient is discharged home.      Disposition  The patient was discharged.     ICD-10 Codes:    ICD-10-CM    1. Neck pain  M54.2 Spine  Referral      2. Acute midline thoracic back pain  M54.6       3. Bulge of cervical disc without myelopathy  M50.30 Spine  Referral           Discharge Medications  New Prescriptions    PREDNISONE (DELTASONE) 20 MG TABLET    Take two tablets (= 40mg) each day for 5 (five) days         Scribe Disclosure:  IJevon, am serving as a scribe at 5:21 AM on 5/10/2024 to document services personally performed by Tea Kirkland MD based on my observations and the provider's statements to me.     [employer]      Tea Kirkland MD  05/10/24 8217

## 2024-05-10 NOTE — ED NOTES
Spoke to group home staff who was looking for update on patients status. Patient is first in line of ED patient's for MRI.    She would like a call back when disposition for patient is determined.    Contact:  Vera Aguilera [phone]

## 2024-05-10 NOTE — ED NOTES
Patient on her call light again, noting that the MD was suppose to be in. Patient was informed that there are other patients the MD is caring for and that they are in with a critcal patient so they will get there as soon as they can. Patient verbalized understanding.

## 2024-05-21 ENCOUNTER — HOSPITAL ENCOUNTER (EMERGENCY)
Facility: CLINIC | Age: 34
Discharge: HOME | End: 2024-05-22
Attending: EMERGENCY MEDICINE | Admitting: EMERGENCY MEDICINE
Payer: COMMERCIAL

## 2024-05-21 DIAGNOSIS — H91.92 HEARING LOSS OF LEFT EAR, UNSPECIFIED HEARING LOSS TYPE: ICD-10-CM

## 2024-05-21 DIAGNOSIS — H92.02 LEFT EAR PAIN: ICD-10-CM

## 2024-05-21 DIAGNOSIS — R51.9 ACUTE NONINTRACTABLE HEADACHE, UNSPECIFIED HEADACHE TYPE: ICD-10-CM

## 2024-05-21 PROCEDURE — 36415 COLL VENOUS BLD VENIPUNCTURE: CPT | Performed by: EMERGENCY MEDICINE

## 2024-05-21 PROCEDURE — 99285 EMERGENCY DEPT VISIT HI MDM: CPT | Mod: 25

## 2024-05-21 PROCEDURE — 80048 BASIC METABOLIC PNL TOTAL CA: CPT | Performed by: EMERGENCY MEDICINE

## 2024-05-21 PROCEDURE — 85025 COMPLETE CBC W/AUTO DIFF WBC: CPT | Performed by: EMERGENCY MEDICINE

## 2024-05-22 ENCOUNTER — APPOINTMENT (OUTPATIENT)
Dept: MRI IMAGING | Facility: CLINIC | Age: 34
End: 2024-05-22
Attending: EMERGENCY MEDICINE
Payer: COMMERCIAL

## 2024-05-22 VITALS
OXYGEN SATURATION: 96 % | RESPIRATION RATE: 18 BRPM | SYSTOLIC BLOOD PRESSURE: 130 MMHG | TEMPERATURE: 98 F | HEART RATE: 90 BPM | DIASTOLIC BLOOD PRESSURE: 67 MMHG

## 2024-05-22 LAB
ANION GAP SERPL CALCULATED.3IONS-SCNC: 13 MMOL/L (ref 7–15)
BASOPHILS # BLD AUTO: 0 10E3/UL (ref 0–0.2)
BASOPHILS NFR BLD AUTO: 0 %
BUN SERPL-MCNC: 9.3 MG/DL (ref 6–20)
CALCIUM SERPL-MCNC: 9.8 MG/DL (ref 8.6–10)
CHLORIDE SERPL-SCNC: 107 MMOL/L (ref 98–107)
CREAT SERPL-MCNC: 0.57 MG/DL (ref 0.51–0.95)
DEPRECATED HCO3 PLAS-SCNC: 23 MMOL/L (ref 22–29)
EGFRCR SERPLBLD CKD-EPI 2021: >90 ML/MIN/1.73M2
EOSINOPHIL # BLD AUTO: 0 10E3/UL (ref 0–0.7)
EOSINOPHIL NFR BLD AUTO: 0 %
ERYTHROCYTE [DISTWIDTH] IN BLOOD BY AUTOMATED COUNT: 13.9 % (ref 10–15)
GLUCOSE SERPL-MCNC: 114 MG/DL (ref 70–99)
HCT VFR BLD AUTO: 43.4 % (ref 35–47)
HGB BLD-MCNC: 14.5 G/DL (ref 11.7–15.7)
HOLD SPECIMEN: NORMAL
IMM GRANULOCYTES # BLD: 0 10E3/UL
IMM GRANULOCYTES NFR BLD: 0 %
LYMPHOCYTES # BLD AUTO: 2.3 10E3/UL (ref 0.8–5.3)
LYMPHOCYTES NFR BLD AUTO: 20 %
MCH RBC QN AUTO: 30.5 PG (ref 26.5–33)
MCHC RBC AUTO-ENTMCNC: 33.4 G/DL (ref 31.5–36.5)
MCV RBC AUTO: 91 FL (ref 78–100)
MONOCYTES # BLD AUTO: 0.9 10E3/UL (ref 0–1.3)
MONOCYTES NFR BLD AUTO: 8 %
NEUTROPHILS # BLD AUTO: 7.9 10E3/UL (ref 1.6–8.3)
NEUTROPHILS NFR BLD AUTO: 72 %
NRBC # BLD AUTO: 0 10E3/UL
NRBC BLD AUTO-RTO: 0 /100
PLATELET # BLD AUTO: 335 10E3/UL (ref 150–450)
POTASSIUM SERPL-SCNC: 4 MMOL/L (ref 3.4–5.3)
RBC # BLD AUTO: 4.75 10E6/UL (ref 3.8–5.2)
SODIUM SERPL-SCNC: 143 MMOL/L (ref 135–145)
WBC # BLD AUTO: 11.1 10E3/UL (ref 4–11)

## 2024-05-22 PROCEDURE — 250N000013 HC RX MED GY IP 250 OP 250 PS 637: Performed by: EMERGENCY MEDICINE

## 2024-05-22 PROCEDURE — 258N000003 HC RX IP 258 OP 636: Performed by: EMERGENCY MEDICINE

## 2024-05-22 PROCEDURE — 96361 HYDRATE IV INFUSION ADD-ON: CPT

## 2024-05-22 PROCEDURE — 96375 TX/PRO/DX INJ NEW DRUG ADDON: CPT

## 2024-05-22 PROCEDURE — 96374 THER/PROPH/DIAG INJ IV PUSH: CPT | Mod: 59

## 2024-05-22 PROCEDURE — 250N000011 HC RX IP 250 OP 636: Performed by: EMERGENCY MEDICINE

## 2024-05-22 PROCEDURE — A9585 GADOBUTROL INJECTION: HCPCS | Performed by: EMERGENCY MEDICINE

## 2024-05-22 PROCEDURE — 70553 MRI BRAIN STEM W/O & W/DYE: CPT

## 2024-05-22 PROCEDURE — 255N000002 HC RX 255 OP 636: Performed by: EMERGENCY MEDICINE

## 2024-05-22 RX ORDER — GADOBUTROL 604.72 MG/ML
0.1 INJECTION INTRAVENOUS ONCE
Status: COMPLETED | OUTPATIENT
Start: 2024-05-22 | End: 2024-05-22

## 2024-05-22 RX ORDER — ACETAMINOPHEN 325 MG/1
975 TABLET ORAL ONCE
Status: COMPLETED | OUTPATIENT
Start: 2024-05-22 | End: 2024-05-22

## 2024-05-22 RX ORDER — HYDROMORPHONE HYDROCHLORIDE 1 MG/ML
0.5 INJECTION, SOLUTION INTRAMUSCULAR; INTRAVENOUS; SUBCUTANEOUS ONCE
Status: COMPLETED | OUTPATIENT
Start: 2024-05-22 | End: 2024-05-22

## 2024-05-22 RX ORDER — HYDROMORPHONE HYDROCHLORIDE 2 MG/1
2 TABLET ORAL EVERY 6 HOURS PRN
Qty: 10 TABLET | Refills: 0 | Status: SHIPPED | OUTPATIENT
Start: 2024-05-22 | End: 2024-05-25

## 2024-05-22 RX ADMIN — HYDROMORPHONE HYDROCHLORIDE 0.5 MG: 1 INJECTION, SOLUTION INTRAMUSCULAR; INTRAVENOUS; SUBCUTANEOUS at 00:25

## 2024-05-22 RX ADMIN — PROCHLORPERAZINE EDISYLATE 10 MG: 5 INJECTION INTRAMUSCULAR; INTRAVENOUS at 00:24

## 2024-05-22 RX ADMIN — ACETAMINOPHEN 975 MG: 325 TABLET, FILM COATED ORAL at 00:27

## 2024-05-22 RX ADMIN — GADOBUTROL 11 ML: 604.72 INJECTION INTRAVENOUS at 01:27

## 2024-05-22 RX ADMIN — SODIUM CHLORIDE 1000 ML: 9 INJECTION, SOLUTION INTRAVENOUS at 00:22

## 2024-05-22 ASSESSMENT — ACTIVITIES OF DAILY LIVING (ADL)
ADLS_ACUITY_SCORE: 43

## 2024-05-22 NOTE — ED TRIAGE NOTES
Patient lives at home independently, she has been having left sides hearing loss for about 3-4 weeks accompanied by TARIQ. She had an ENT appointment today at 1300 and left sided hearing loss was confirmed. Patient was told to follow up with PCP if symptoms continued. Patient arrives tonight stating that the pain is so severe that she can't manage. Pain begins in the left should and goes up to the encompass the entire left side of the head with nausea and dizziness. Patient was given 50 mcg fent en route via ems and 4 mg zofran with no relief. Patient can't take NSAIDS due to being on eliquis for PE 4-5 weeks ago.

## 2024-05-22 NOTE — ED PROVIDER NOTES
"  Emergency Department Note      History of Present Illness     Chief Complaint  Headache    HPI  Nevin Alvarado is a 33 year old female with history of type 2 diabetes and hypertension who presents to the ED via EMS for evaluation of headache. Patient was seen at Urgent Care two days ago and was diagnosed with acute bacterial infection of both ears. She had an appointment with ENT yesterday and was told there was no ear infection but had left sided hearing loss. She reports having \"jabbing\" left sided ear pain for 3-4 weeks along with constant headaches to the point she describes that her head is \"going to explode\". She also endorses dizziness, nausea, brain \"fog\", confusion and unsteady balance. The pain radiates to the left side of her neck, face, and forehead. Patient was taken off her antibiotics. She mentions being given prednisone with no relief and was told to follow up in 2 weeks but couldn't wait due to the pain.     Independent Historian  None    Review of External Notes  Reviewed urgent care note from 5/20/2024  Past Medical History   Medical History and Problem List  Attention deficit disorder  Anorexia nervous with bulimia  Anxiety  Asthma  Borderline personality disorder  Depression  Depressive disorder  Type 2 diabetes   Suicide attempt  Pulmonary embolism  Morbid obesity  Neuropathy  PTSD  Pulmonary emboli  Rectal foreign body, recurrent issue, self placed  Self-injurious behavior  Sleep apnea  Suicidal overdose  Syncope  GERD  Hypertension   RAD   Suicidal ideation     Medications  Albuterol inhaler   Tessalon  Zyrtec  Klonopin  Flexeril  Bentyl  Lexapro  Ferrous sulfate  Atarax   Singulair  Aygestin  Zofran  Protonix  Prednisone  Ozempic  Valtrex   Aygestin     Surgical History   EGD combined x20  Sigmoidoscopy flexible x3  Release carpal tunnel x2  Removal fecal impaction or foreign body under anesthesia  Esophagogastroduodenoscopy transoral diagnostic x10  Knee surgery x2  Mammoplasty " reduction  Laparotomy exploratory x2  PICC double lumen placement   Appendectomy   Cholecystectomy  Lymphadenotomy   Power port placement     Physical Exam   Patient Vitals for the past 24 hrs:   BP Temp Temp src Pulse Resp SpO2   05/22/24 0000 134/79 -- -- 88 -- 94 %   05/21/24 2332 -- -- -- -- -- 93 %   05/21/24 2331 -- -- -- -- -- 96 %   05/21/24 2330 (!) 143/85 -- -- 84 -- --   05/21/24 2328 -- 98  F (36.7  C) Oral -- 20 --   05/21/24 2327 -- -- -- 96 -- 96 %   05/21/24 2321 (!) 159/96 -- -- 98 -- --     Physical Exam  General: Patient is awake, alert and interactive. Appears uncomfortable.   Head: The scalp, face, and head appear normal. Atraumatic.   Eyes: The pupils are equal, round, and reactive to light. Conjunctivae and sclerae are normal. EOMI without nystagmus.  ENT: External acoustic canals are normal. The oropharynx is normal without erythema. Uvula is in the midline  Neck: Normal range of motion.   CV: Regular rate.   Resp: No respiratory distress.   GI: Abdomen is soft, no rigidity, guarding, or rebound. No distension. No tenderness to palpation in any quadrant.     MS: Normal tone.   Skin: No rash or lesions noted. Normal capillary refill noted  Neuro: Speech is normal and fluent. Face is symmetric without droop. Moving all extremities well. CN's II-XII intact. 5/5 UE and LE strength.   Psych:  Normal affect.  Appropriate interactions.  Diagnostics   Lab Results   Labs Ordered and Resulted from Time of ED Arrival to Time of ED Departure   BASIC METABOLIC PANEL - Abnormal       Result Value    Sodium 143      Potassium 4.0      Chloride 107      Carbon Dioxide (CO2) 23      Anion Gap 13      Urea Nitrogen 9.3      Creatinine 0.57      GFR Estimate >90      Calcium 9.8      Glucose 114 (*)    CBC WITH PLATELETS AND DIFFERENTIAL - Abnormal    WBC Count 11.1 (*)     RBC Count 4.75      Hemoglobin 14.5      Hematocrit 43.4      MCV 91      MCH 30.5      MCHC 33.4      RDW 13.9      Platelet Count 335       % Neutrophils 72      % Lymphocytes 20      % Monocytes 8      % Eosinophils 0      % Basophils 0      % Immature Granulocytes 0      NRBCs per 100 WBC 0      Absolute Neutrophils 7.9      Absolute Lymphocytes 2.3      Absolute Monocytes 0.9      Absolute Eosinophils 0.0      Absolute Basophils 0.0      Absolute Immature Granulocytes 0.0      Absolute NRBCs 0.0       Imaging  MR Brain w/o & w Contrast   Preliminary Result   IMPRESSION:   1.  No acute intracranial process.   2.  Grossly stable scattered nonspecific foci of T2/FLAIR signal hyperintensity within the supratentorial white matter. Again differential diagnostic considerations include nonspecific gliosis, advanced for age chronic small vessel ischemic change,    chronic migraine effect, Lyme disease, autoimmune vasculitis or even a demyelinating process.        Report per radiology    Independent Interpretation  None  ED Course    Medications Administered  Medications   HYDROmorphone (PF) (DILAUDID) injection 0.5 mg (0.5 mg Intravenous $Given 5/22/24 0025)   prochlorperazine (COMPAZINE) injection 10 mg (10 mg Intravenous $Given 5/22/24 0024)   sodium chloride 0.9% BOLUS 1,000 mL (1,000 mLs Intravenous $New Bag 5/22/24 0022)   acetaminophen (TYLENOL) tablet 975 mg (975 mg Oral $Given 5/22/24 0027)   gadobutrol (GADAVIST) injection 11.25 mL (11 mLs Intravenous $Given 5/22/24 0127)     Procedures  Procedures     Discussion of Management  None    Social Determinants of Health adding to complexity of care  None    ED Course  ED Course as of 05/22/24 0247   Wed May 22, 2024   0010 I obtained history and examined the patient as noted above.    0104 I rechecked the patient and explained findings.    0233 I rechecked the patient and explained findings. I prepared the patient to be discharged home.      Medical Decision Making / Diagnosis   CMS Diagnoses: None    MIPS     None    MDM  Patient is a 33-year-old female with past medical history of chronic  inflammatory demyelinating polyneuropathy and additional complex past medical history who presents emergency department with left ear pain and hearing loss.  Patient was seen in urgent care 2 days ago and was diagnosed with bilateral ear infections and eustachian tube dysfunction.  Was started on antibiotics and steroids and referred to ENT.  ENT saw her earlier today and does not believe that she has any acute infection but agrees with eustachian tube dysfunction.  They discontinued her antibiotics but continued her on steroids.  She comes in the emergency department tonight due to ongoing headache and left-sided ear pain.  On initial evaluation here she is hemodynamically stable with no vital signs.  She is afebrile and oxygenating well on room air.  On physical exam she has no significant signs of TM effusion or infection.  She does not have any focal neurological deficits.  Plan was for obtaining MRI.  MRI tech called down given her history of ingested foreign bodies and we discussed obtaining x-rays prior to MRI to ensure that there is no significant foreign body.  When I explained this to the patient she requested to forego any x-rays and reassured me that she had not ingested any foreign body.  After discussion with radiology tech we elected to move forward without x-rays.  MRI was obtained which shows no acute pathology including mass, hemorrhage or infarction.  At this point it is unclear what is causing the patient's headache and left ear pain.  Recommended continuing to follow-up with neurology and ENT.  Will send her home with a short course of pain medications that she can use as needed.    Disposition  The patient was discharged.     ICD-10 Codes:    ICD-10-CM    1. Left ear pain  H92.02       2. Hearing loss of left ear, unspecified hearing loss type  H91.92       3. Acute nonintractable headache, unspecified headache type  R51.9          Discharge Medications  New Prescriptions    HYDROMORPHONE  (DILAUDID) 2 MG TABLET    Take 1 tablet (2 mg) by mouth every 6 hours as needed for pain     Scribe Disclosure:  I, Olga Shafer, am serving as a scribe at 12:15 AM on 5/22/2024 to document services personally performed by Campbell Posada based on my observations and the provider's statements to me.   [employer]      Campbell Posada MD  05/22/24 [id number]

## 2024-05-28 NOTE — TELEPHONE ENCOUNTER
FUTURE VISIT INFORMATION      SURGERY INFORMATION:  Date: 9/10/24  [facility]  Surgeon:  Felipe Ulloa DO   Anesthesia Type:  mac  Procedure: Esophagoscopy, gastroscopy, duodenoscopy (EGD), combined    RECORDS REQUESTED FROM:       Primary Care Provider: Latonya Knight MD  - Harry    Most recent EKG+ Tracin24    Most recent PFT's: 23    Most recent Sleep Study:   24- [facility]

## 2024-06-04 ENCOUNTER — HOSPITAL ENCOUNTER (EMERGENCY)
Facility: CLINIC | Age: 34
Discharge: HOME | End: 2024-06-04
Attending: EMERGENCY MEDICINE | Admitting: EMERGENCY MEDICINE
Payer: COMMERCIAL

## 2024-06-04 ENCOUNTER — VIRTUAL VISIT (OUTPATIENT)
Dept: ENDOCRINOLOGY | Facility: CLINIC | Age: 34
End: 2024-06-04
Payer: COMMERCIAL

## 2024-06-04 VITALS
TEMPERATURE: 98 F | OXYGEN SATURATION: 100 % | DIASTOLIC BLOOD PRESSURE: 84 MMHG | BODY MASS INDEX: 45.45 KG/M2 | HEART RATE: 85 BPM | RESPIRATION RATE: 16 BRPM | WEIGHT: 247 LBS | SYSTOLIC BLOOD PRESSURE: 152 MMHG | HEIGHT: 62 IN

## 2024-06-04 VITALS — BODY MASS INDEX: 45.45 KG/M2 | WEIGHT: 247 LBS | HEIGHT: 62 IN

## 2024-06-04 DIAGNOSIS — R04.0 EPISTAXIS: ICD-10-CM

## 2024-06-04 DIAGNOSIS — Z71.3 NUTRITIONAL COUNSELING: Primary | ICD-10-CM

## 2024-06-04 DIAGNOSIS — E66.9 OBESITY: ICD-10-CM

## 2024-06-04 DIAGNOSIS — E11.9 TYPE 2 DIABETES MELLITUS WITHOUT COMPLICATION, WITHOUT LONG-TERM CURRENT USE OF INSULIN (H): ICD-10-CM

## 2024-06-04 PROCEDURE — 97803 MED NUTRITION INDIV SUBSEQ: CPT | Mod: 95

## 2024-06-04 PROCEDURE — 99282 EMERGENCY DEPT VISIT SF MDM: CPT

## 2024-06-04 PROCEDURE — 99207 PR NO CHARGE LOS: CPT | Mod: 95

## 2024-06-04 ASSESSMENT — COLUMBIA-SUICIDE SEVERITY RATING SCALE - C-SSRS
1. IN THE PAST MONTH, HAVE YOU WISHED YOU WERE DEAD OR WISHED YOU COULD GO TO SLEEP AND NOT WAKE UP?: NO
6. HAVE YOU EVER DONE ANYTHING, STARTED TO DO ANYTHING, OR PREPARED TO DO ANYTHING TO END YOUR LIFE?: NO
2. HAVE YOU ACTUALLY HAD ANY THOUGHTS OF KILLING YOURSELF IN THE PAST MONTH?: NO

## 2024-06-04 NOTE — ED PROVIDER NOTES
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     vomiting for days    Blood-Group Specific Substance Other (See Comments)     Patient has an anti-Cw and non-specific antibodies. Blood product orders may be delayed. Draw one red top and two purple top tubes for all  type/screen/crossmatch orders.  Patient has anti-Cw, anti-K (Angella), Warm auto and nonspecific antibodies. Blood products may be delayed. Draw patient 24 hours prior to transfusion. Draw one red top and two purple top tubes for all type and screen orders.    Clavulanic Acid Angioedema    Haemophilus B Polysaccharide Vaccine Nausea and Vomiting    Oxycodone Swelling    Adhesive Tape Rash     Silicone type  Adhesive allergy      Band-Aid Anti-Itch      Other reaction(s): adhesive allergy    Cephalosporins Rash    Lamotrigine Rash     Possibly associated with Lamictal.     Naltrexone Other (See Comments)     Reaction(s): Vivid dreams.    No Clinical Screening - See Comments Other (See Comments)     History of swallowing sharp metallic objects. She should not be prescribed lancets due to posed risk of swallowing.        FAMILY HISTORY:  Family History   Problem Relation Age of Onset    Diabetes Type 2  Maternal Grandmother     Diabetes Type 2  Paternal Grandmother     Breast Cancer Paternal Grandmother     Cerebrovascular Disease Father 53    Myocardial Infarction No family hx of     Coronary Artery Disease Early Onset No family hx of     Ovarian Cancer No family hx of     Colon Cancer No family hx of     Depression Mother     Anxiety Disorder Mother        SOCIAL HISTORY:   Social History     Socioeconomic History    Marital status: Single   Occupational History    Occupation: On disability   Tobacco Use    Smoking status: Never    Smokeless tobacco: Never   Substance and Sexual Activity    Alcohol use: No     Alcohol/week: 0.0 standard drinks of alcohol    Drug use: No    Sexual activity: Not Currently     Partners: Male     Birth control/protection: I.U.D.     Comment: IUD - Mirena - placed July, 2015   Social History Narrative    Single.    Living in independent living portion of [employer].    On disability.    No regular exercise.      Social Determinants of Health     Financial Resource Strain: Low Risk   "(6/16/2023)    Received from [facility], [facility]    Financial Resource Strain     Difficulty of Paying Living Expenses: 3   Food Insecurity: No Food Insecurity (6/16/2023)    Received from [facility], [facility]    Food Insecurity     Worried About Running Out of Food in the Last Year: 1   Transportation Needs: No Transportation Needs (6/16/2023)    Received from [facility], [facility]    Transportation Needs     Lack of Transportation (Medical): 1   Social Connections: Socially Integrated (6/16/2023)    Received from [facility], [facility]    Social Connections     Frequency of Communication with Friends and Family: 0   Interpersonal Safety: Unknown (4/27/2024)    Received from [facility]    Humiliation, Afraid, Rape, and Kick questionnaire     Physically Abused: No     Sexually Abused: No   Housing Stability: Low Risk  (6/16/2023)    Received from [facility], [facility]    Housing Stability     Unable to Pay for Housing in the Last Year: 1       VITALS:  Patient Vitals for the past 24 hrs:   BP Temp Temp src Pulse Resp SpO2 Height Weight   06/04/24 1343 (!) 152/84 98  F (36.7  C) Temporal 85 16 100 % 1.575 m (5' 2\") 112 kg (247 lb)       PHYSICAL EXAM    GENERAL: Awake, alert.  In no acute distress.   HEENT: Normocephalic, atraumatic.  Pupils equal, round and reactive.  Conjunctiva normal.  EOMI. Nose bleed resolved.  NECK: No stridor or apparent deformity.  EXTREMITIES: No lower extremity swelling or edema.    NEURO: Alert and oriented to person, place and time.  Cranial nerves grossly intact.  No focal motor deficit.  PSYCH: Anxious appearing.   SKIN: No rashes       "   Mavis Garcia MD  06/04/24 9783

## 2024-06-04 NOTE — DISCHARGE INSTRUCTIONS
If you have a nosebleed, use a nose clamp to pinch your nostrils shut and leave it in place for 15 minutes. Do not blow your nose after the nosebleed stops because that will make your nosebleed start again. Please have your piercing studio take out your nose ring today.

## 2024-06-04 NOTE — LETTER
"6/4/2024       RE: Nevin Alvarado  [address]  [facility] [place] [address]     Dear Colleague,    Thank you for referring your patient, Nevin Alvarado, to the [facility] [place] at [facility]. Please see a copy of my visit note below.    Video-Visit Details    Type of service:  Video Visit    Video Start Time: 8:02 AM  Video End Time: 8:18 AM    Originating Location (pt. Location): Home    Distant Location (provider location): Offsite (providers home) [facility] [place]     Platform used for Video Visit: AmFireBlade    Return Bariatric Nutrition Consultation Note    Reason For Visit: Nutrition Assessment    Nevin Alvarado is a 33 year old presenting today for return bariatric nutrition consult.  Pt is interested in potential laparoscopic sleeve gastrectomy in the future.  Currently working on medical weight management. Patient is accompanied by self.  This is pt's 6th nutrition visit.    Pt referred by Sharon Toro NP on September 12, 2023.  CO-MORBIDITIES OF OBESITY INCLUDE:        9/12/2023    12:48 PM   --   I have the following health issues associated with obesity Type II Diabetes    High Blood Pressure    Sleep Apnea    Polycystic Ovarian Syndrome    GERD (Reflux)    Fatty Liver    Asthma    Stress Incontinence     SUPPORT:      9/12/2023    12:48 PM   Support System Reviewed With Patient   Who do you have in your support network that can be available to help you for the first 2 weeks after surgery? I live in a group home with 24 hour staff, mom   Who can you count on for support throughout your weight loss surgery journey? a friend, and my therapist     ANTHROPOMETRICS:  Initial Consult Weight: 309 lb on 9/13/23  Estimated body mass index is 45.17 kg/m  as calculated from the following:    Height as of this encounter: 1.575 m (5' 2.01\").    Weight as of this encounter: " 112 kg (247 lb).  Current Weight: 247 lb per pt report     Wt Readings from Last 5 Encounters:   06/04/24 112 kg (247 lb)   05/09/24 112.5 kg (248 lb)   05/02/24 112.5 kg (248 lb)   04/25/24 110.7 kg (244 lb)   04/25/24 110.7 kg (244 lb)     Required weight loss goal pre-op: -20 lbs from initial consult weight (goal weight 289 lbs or less before surgery) - met        9/12/2023    12:48 PM   --   I have tried the following methods to lose weight Watching portions or calories    Exercise    Pre packaged meals ex: Nutrisystem    OTC Medications    Prescription Medications         9/12/2023    12:48 PM   Weight Loss Questions Reviewed With Patient   How long have you been overweight? Since late teens through early 20's     MEDICATION FOR WEIGHT LOSS:  Ozempic 1 mg - continues on this, feels she is doing fine with this dose.     Hx of self harm- swallowing thing- started after she was treated for anorexia when taking topiramate- in 6 months has only had one day of self harm- this was 2 weeks ago and instead of swallowing she inserted something per rectum. - Followed by MASON Potter PA-C, PE in 2019 after esophageal perforation repair     VITAMIN/MINERAL SUPPLEMENTS:  Iron, Vitamin B12, Vitamin D     NUTRITION HISTORY:  Food allergies:NKFA  Food intolerances: None  Previous experience with diet modification for weight loss: Nutrisystem, prescription medications, exercise, watching calories or portions     Has been meal planning for the past 3 months. Likes chicken, turkey, shrimp.      October 2023: Eating 3 meals per day. Lunch and dinner meals have been chili or mediterranean orzo salad with artichokes more recently. Drinks mostly water, Cup of coffee, [employer]. Doesn't drink soda or juice. Uses a walker for neuropathy. Walking around more often/standing longer which has been an improvement.     November 2023: Reports she has been sick a lot recently. Sometimes feels she has to force herself to eat.  Currently has a cold. Working with a GI doctor right now also as she has been having some GI symptoms after eating certain foods. Reports she had another self harm episode unfortunately, feels this will delay her chances for surgery.     January 2024: Had been dealing with illness recently which set her back a little bit on her goals. Has been using Wegovy. Feels since starting the Wegovy her face gets puffy or red blotches on it, plans to keep a close eye on it before deciding to stop. Had been eating very well but finding more temptations now. Feels she is not cooking as often and doing more frozen meals.  Hasn't been using walker for a few weeks.     April 2024: Since visiting Sharon nutrition has been off and on since she was in the hospital and multiple trips to the ED. Currently being treated for pneumonia, PE, depression and anxiety. Reports she continues to have constipation/diarrhea on and off. Has Miralax as needed. Also has enema prescription also. Nutrition has been very challenging over the last month due to being in the hospital for 15 days. She did continue to do her Wegovy injections while in the hospital. Plan is to focus on getting healthy and feeling better then will work on her good eating habits again.      June 2024: Things have been going a lot better for patient since last visit. She switched from Wegovy to Ozempic over the last month. Eating 2-3 meals a day. Bowel habits have improved since last RD visit. Physical activity has gotten a lot better with the weight loss. Does not use walker anymore. Walking around a lot. Also spoke with a diabetes educator in May.         9/12/2023    12:48 PM   Recall Diet Questions Reviewed With Patient   Describe what you typically consume for breakfast (typical or most recent) eggs and hash browns, homemade waffles, laith pudding with fruit   Describe what you typically consume for lunch (typical or most recent) homemade lunchables, some pasta or chicken    Describe what you typically consume for supper (typical or most recent) low calorie meal prep meals   Describe what you typically consume as snacks (typical or most recent) cheese sticks, fruit jerky, fruits/vegetables   How many ounces of water, or other low calorie drinks, do you drink daily (8 oz=1 glass)? 48 oz   How many ounces of caffeine (coffee, tea, pop) do you drink daily (8 oz=1 glass)? 16 oz   How many ounces of carbonated (pop, beer, sparkling water) drinks do you drinky daily (8 oz=1 glass)? 0 oz   How many ounces of juice, pop, sweet tea, sports drinks, protein drinks, other sweetened drinks, do you drink daily (8 oz=1 glass)? 0 oz   How many ounces of milk do you drink daily (8 oz=1 glass) 0 oz   How often do you drink alcohol? Never           9/12/2023    12:48 PM   Eating Habits   What foods do you crave? ice cream, chocolate, sometimes just salty chips           9/12/2023    12:48 PM   Dining Out History Reviewed With Patient   How often do you dine out? Rarely.   Where do you dine out? (select all that apply) take out   What types of food do you order when you dine out? jitendra verdin     EXERCISE:        5/2/2024    10:45 AM   --   How often do you exercise? 1 to 2 times per week   What is the duration of your exercise (in minutes)? 10 Minutes   What types of exercise do you do? walking    climbing stairs at work   What keeps you from being more active? I have recently been sick     NUTRITION DIAGNOSIS:  Obesity r/t long history of positive energy balance aeb BMI >30 kg/m2.    INTERVENTION:  Intervention Provided/Education Provided on post-op diet guidelines, vitamins/minerals essential post-operatively, GI anatomy of bariatric surgeries, ways to help prepare for post-op diet guidelines pre-operatively, portion/calorie-control, mindful eating and sources of protein.  Patient demonstrates understanding.     Personal barriers to making and continuing required life changes have been identified, and  strategies to overcome those barriers have been recommended AND family and social supports have been assessed and strategies to strengthen those supports have been recommended.    Provided pt with list of goals, RD contact information and resources listed below via [employer].           9/12/2023    12:48 PM   Questions Reviewed With Patient   How ready are you to make changes regarding your weight? Number 1 = Not ready at all to make changes up to 10 = very ready. 10   How confident are you that you can change? 1 = Not confident that you will be successful making changes up to 10 = very confident. 7     Expected Engagement: good    GOALS:  1) Continue to eat 3 small meals daily  2) Have a good protein source at all meals  3) Keep up with hydration, aim for 64 oz of fluid daily.     Follow Up: August 22.     Time spent with patient: 16 minutes.  Nevin Birmingham RD, LD

## 2024-06-04 NOTE — PATIENT INSTRUCTIONS
Jay Rooney,     Follow-up with SIDNEY on August 22.     Thank you,    Nevin Birmingham, SIDNEY, LD  If you would like to schedule or reschedule an appointment with the RD, please call [phone]    Nutrition Goals  1) Continue to eat 3 small meals daily  2) Have a good protein source at all meals  3) Keep up with hydration, aim for 64 oz of fluid daily.     COMPREHENSIVE WEIGHT MANAGEMENT PROGRAM  VIRTUAL SUPPORT GROUPS    At [facility], our Comprehensive Weight Management program offers on-line support groups for patients who are working on weight loss and considering, preparing for, or have had weight loss surgery.     There is no cost for this opportunity.  You are invited to attend the?Virtual Support Groups?provided by any of the following locations:    [facility] via Microsoft Teams with Roxanna Alonso RN  2.   [place] via Collexpo with Bird Franz, PhD, LP  3.   [place] via Collexpo with Margie Stock RN  4.   [facility] via a Zoom Meeting with RUBÉN San    The following Support Group information can also be found on our website:  [email or web address]      [facility] Weight Loss Surgery Support Group  The support group is a patient-lead forum that meets monthly to share experiences, encouragement and education. It is open to those who have had weight loss surgery, are scheduled for surgery, or are considering surgery.   WHEN: This group meets on the 3rd Wednesday of each month from 5:00PM - 6:00PM virtually using Microsoft Teams.   FACILITATOR: Led by Roxanna Greenberg RD, BLADE, RN, the program's Clinical Coordinator.   TO REGISTER: Please contact the clinic via [facility] or call the nurse line directly at [phone] to inform our staff that you would like an invite sent to you and to let us know the email you would like the invite sent to. Prior to the meeting, a link with directions  "on how to join the meeting will be sent to you.    2024 Meetings   January 17  February 21  March 20  April 17  May 15  Candice 19      [facility] Bariatric Care Support Group?  This is open to all pre- and post- operative bariatric surgery patients as well as their support system.   WHEN: This group meets the 3rd Tuesday of each month from 6:30 PM - 8:00 PM virtually using Microsoft Teams.   FACILITATOR: Led by Bird Franz, Ph.D who is a Licensed Psychologist with the [facility] Comprehensive Weight Management Program.   TO REGISTER: Please send an email to Bird Franz, Ph.D.,  at?[email or web address]?if you would like an invitation to the group. Prior to the meeting, a link with directions on how to join the meeting will be sent to you.    2024 Meetings January 16: \"Medication Management and Bariatric Surgery\", Gayle Oconnell, PharmD, Pharmacy Resident at [facility]  February 20: \"A Bariatric Surgery Patient's Perspective\", MYLES Keys, [facility], Behavioral Health Clinician at [facility]  March 19  April 16  May 21  Candice 18: \"Nutritional Labeling\", Dietitian speaker to be announced.  November 19: \"Holiday Eating\", Dietitian speaker to be announced.    [facility] Post-Operative Bariatric Surgery Support Group  This is a support group for [facility] bariatric patients (and those external to [facility]) who have had bariatric surgery and are at least 3 months post-surgery.  WHEN: This support group meets the 4th Wednesday of the month from 11:00 AM - 12:00 PM virtually using Microsoft Teams.   FACILITATOR: Led by Certified Bariatric Nurse, Margie Stock RN.   TO REGISTER: Please send an email to Margie at [email or web address] if you would like an invitation to the group.  Prior to the meeting, a link with directions on how " "to join the meeting will be sent to you.    2024 Meetings  January 24  February 28  March 27  April 24  May 22  Candice 26    [facility] Healthy Lifestyle Group?  This is a 60 minute virtual coaching group for those who want to lead a healthier lifestyle. Come together to set goals and overcome barriers in a supportive group environment. We will address the four pillars of health: nutrition, exercise, sleep and emotional well-being.  This group is highly recommended for those who are participating in the 24 week Healthy Lifestyle Plan and our Health Coaching sessions.  WHEN: This group meets the 1st Friday of the month, 12:30 PM - 1:30 PM online, via a zoom meeting.    FACILITATOR: Led by National Board Certified Health and , Margie Yan [facility].   TO REGISTER: Please call the Call Center at [phone] to register. You will get an appointment to attend in [place]. Fifteen minutes prior to the meeting, complete the e-check in and you will get the link to join the meeting.    There is no charge to attend this group and space is limited.     2024 Meetings  January 5: \"New Years Vision: Manifest your Best 2024!\" (guided imagery,  journaling and discussion)  February 2: \"Let's Talk\"  March 1: \"10 Percent Happier\" by Jovanni Koenig (Book Bites - a guided discussion on the nuggets of wisdom from favorite wellness books, no need to read the book but highly encouraged)  April 5: \"Let's Talk\"  May 3: \"Essentialism: The Disciplined Pursuit of Less\" by Varinder Franz (Book Bites discussion)  June 7: \"Let's Talk\"  July 5: NO MEETING, off for the 4th of July Holiday  August 2: \"The Blue Zones, Secrets for Living a Longer Life\" by Jovanni Jim (Book Bites discussion)        "

## 2024-06-04 NOTE — NURSING NOTE
Is the patient currently in the state of [place]? YES    Visit mode:VIDEO    If the visit is dropped, the patient can be reconnected by: VIDEO VISIT: Text to cell phone:   Telephone Information:   Mobile [phone]       Will anyone else be joining the visit? NO  (If patient encounters technical issues they should call [phone])    How would you like to obtain your AVS? MyChart    Are changes needed to the allergy or medication list? Pt stated no changes to allergies and Pt stated no med changes    Reason for visit: Follow Up    Janie ANTONIO

## 2024-06-04 NOTE — ED TRIAGE NOTES
Arrives via EMS for nose bleed. Per EMS, they were out to pt's home earlier today for nose bleed and they gave her a nose clamp, evaluated and left pt's home.    Pt then called EMS back due to another nose bleed. Pt reports she is on a blood thinner and concerned nose bleed wouldn't stop.     Denies pain, n/v/d or any other issue.      Triage Assessment (Adult)       Row Name 06/04/24 4475          Triage Assessment    Airway WDL WDL        Respiratory WDL    Respiratory WDL WDL        Skin Circulation/Temperature WDL    Skin Circulation/Temperature WDL WDL        Cardiac WDL    Cardiac WDL WDL        Peripheral/Neurovascular WDL    Peripheral Neurovascular WDL WDL        Cognitive/Neuro/Behavioral WDL    Cognitive/Neuro/Behavioral WDL WDL

## 2024-06-04 NOTE — PROGRESS NOTES
"Video-Visit Details    Type of service:  Video Visit    Video Start Time: 8:02 AM  Video End Time: 8:18 AM    Originating Location (pt. Location): Home    Distant Location (provider location): Offsite (providers home) [facility] [place]     [facility] for [facility] Visit: [facility]    Return Bariatric Nutrition Consultation Note    Reason For Visit: Nutrition Assessment    Nevin Alvarado is a 33 year old presenting today for return bariatric nutrition consult.  Pt is interested in potential laparoscopic sleeve gastrectomy in the future.  Currently working on medical weight management. Patient is accompanied by self.  This is pt's 6th nutrition visit.    Pt referred by Sharon Toro NP on September 12, 2023.  CO-MORBIDITIES OF OBESITY INCLUDE:        9/12/2023    12:48 PM   --   I have the following health issues associated with obesity Type II Diabetes    High Blood Pressure    Sleep Apnea    Polycystic Ovarian Syndrome    GERD (Reflux)    Fatty Liver    Asthma    Stress Incontinence     SUPPORT:      9/12/2023    12:48 PM   Support System Reviewed With Patient   Who do you have in your support network that can be available to help you for the first 2 weeks after surgery? I live in a group home with 24 hour staff, mom   Who can you count on for support throughout your weight loss surgery journey? a friend, and my therapist     ANTHROPOMETRICS:  Initial Consult Weight: 309 lb on 9/13/23  Estimated body mass index is 45.17 kg/m  as calculated from the following:    Height as of this encounter: 1.575 m (5' 2.01\").    Weight as of this encounter: 112 kg (247 lb).  Current Weight: 247 lb per pt report     Wt Readings from Last 5 Encounters:   06/04/24 112 kg (247 lb)   05/09/24 112.5 kg (248 lb)   05/02/24 112.5 kg (248 lb)   04/25/24 110.7 kg (244 lb)   04/25/24 110.7 kg (244 lb)     Required weight loss goal pre-op: -20 lbs from initial consult weight (goal weight 289 lbs or less " before surgery) - met        9/12/2023    12:48 PM   --   I have tried the following methods to lose weight Watching portions or calories    Exercise    Pre packaged meals ex: Nutrisystem    OTC Medications    Prescription Medications         9/12/2023    12:48 PM   Weight Loss Questions Reviewed With Patient   How long have you been overweight? Since late teens through early 20's     MEDICATION FOR WEIGHT LOSS:  Ozempic 1 mg - continues on this, feels she is doing fine with this dose.     Hx of self harm- swallowing thing- started after she was treated for anorexia when taking topiramate- in 6 months has only had one day of self harm- this was 2 weeks ago and instead of swallowing she inserted something per rectum. - Followed by MASON Potter PA-C, PE in 2019 after esophageal perforation repair     VITAMIN/MINERAL SUPPLEMENTS:  Iron, Vitamin B12, Vitamin D     NUTRITION HISTORY:  Food allergies:NKFA  Food intolerances: None  Previous experience with diet modification for weight loss: Nutrisystem, prescription medications, exercise, watching calories or portions     Has been meal planning for the past 3 months. Likes chicken, turkey, shrimp.      October 2023: Eating 3 meals per day. Lunch and dinner meals have been chili or mediterranean orzo salad with artichokes more recently. Drinks mostly water, Cup of coffee, [employer]. Doesn't drink soda or juice. Uses a walker for neuropathy. Walking around more often/standing longer which has been an improvement.     November 2023: Reports she has been sick a lot recently. Sometimes feels she has to force herself to eat. Currently has a cold. Working with a GI doctor right now also as she has been having some GI symptoms after eating certain foods. Reports she had another self harm episode unfortunately, feels this will delay her chances for surgery.     January 2024: Had been dealing with illness recently which set her back a little bit on her goals. Has been  using Wegovy. Feels since starting the Wegovy her face gets puffy or red blotches on it, plans to keep a close eye on it before deciding to stop. Had been eating very well but finding more temptations now. Feels she is not cooking as often and doing more frozen meals.  Hasn't been using walker for a few weeks.     April 2024: Since visiting Sharon nutrition has been off and on since she was in the hospital and multiple trips to the ED. Currently being treated for pneumonia, PE, depression and anxiety. Reports she continues to have constipation/diarrhea on and off. Has Miralax as needed. Also has enema prescription also. Nutrition has been very challenging over the last month due to being in the hospital for 15 days. She did continue to do her Wegovy injections while in the hospital. Plan is to focus on getting healthy and feeling better then will work on her good eating habits again.      June 2024: Things have been going a lot better for patient since last visit. She switched from Wegovy to Ozempic over the last month. Eating 2-3 meals a day. Bowel habits have improved since last RD visit. Physical activity has gotten a lot better with the weight loss. Does not use walker anymore. Walking around a lot. Also spoke with a diabetes educator in May.         9/12/2023    12:48 PM   Recall Diet Questions Reviewed With Patient   Describe what you typically consume for breakfast (typical or most recent) eggs and hash browns, homemade waffles, laith pudding with fruit   Describe what you typically consume for lunch (typical or most recent) homemade lunchables, some pasta or chicken   Describe what you typically consume for supper (typical or most recent) low calorie meal prep meals   Describe what you typically consume as snacks (typical or most recent) cheese sticks, fruit jerky, fruits/vegetables   How many ounces of water, or other low calorie drinks, do you drink daily (8 oz=1 glass)? 48 oz   How many ounces of caffeine  (coffee, tea, pop) do you drink daily (8 oz=1 glass)? 16 oz   How many ounces of carbonated (pop, beer, sparkling water) drinks do you drinky daily (8 oz=1 glass)? 0 oz   How many ounces of juice, pop, sweet tea, sports drinks, protein drinks, other sweetened drinks, do you drink daily (8 oz=1 glass)? 0 oz   How many ounces of milk do you drink daily (8 oz=1 glass) 0 oz   How often do you drink alcohol? Never           9/12/2023    12:48 PM   Eating Habits   What foods do you crave? ice cream, chocolate, sometimes just salty chips           9/12/2023    12:48 PM   Dining Out History Reviewed With Patient   How often do you dine out? Rarely.   Where do you dine out? (select all that apply) take out   What types of food do you order when you dine out? jitendra verdin     EXERCISE:        5/2/2024    10:45 AM   --   How often do you exercise? 1 to 2 times per week   What is the duration of your exercise (in minutes)? 10 Minutes   What types of exercise do you do? walking    climbing stairs at work   What keeps you from being more active? I have recently been sick     NUTRITION DIAGNOSIS:  Obesity r/t long history of positive energy balance aeb BMI >30 kg/m2.    INTERVENTION:  Intervention Provided/Education Provided on post-op diet guidelines, vitamins/minerals essential post-operatively, GI anatomy of bariatric surgeries, ways to help prepare for post-op diet guidelines pre-operatively, portion/calorie-control, mindful eating and sources of protein.  Patient demonstrates understanding.     Personal barriers to making and continuing required life changes have been identified, and strategies to overcome those barriers have been recommended AND family and social supports have been assessed and strategies to strengthen those supports have been recommended.    Provided pt with list of goals, RD contact information and resources listed below via [employer].           9/12/2023    12:48 PM   Questions Reviewed With Patient   How  ready are you to make changes regarding your weight? Number 1 = Not ready at all to make changes up to 10 = very ready. 10   How confident are you that you can change? 1 = Not confident that you will be successful making changes up to 10 = very confident. 7     Expected Engagement: good    GOALS:  1) Continue to eat 3 small meals daily  2) Have a good protein source at all meals  3) Keep up with hydration, aim for 64 oz of fluid daily.     Follow Up: August 22.     Time spent with patient: 16 minutes.  Nevin Birmingham RD, LD

## 2024-06-05 ENCOUNTER — HOSPITAL ENCOUNTER (EMERGENCY)
Facility: CLINIC | Age: 34
Discharge: HOME | End: 2024-06-05
Attending: STUDENT IN AN ORGANIZED HEALTH CARE EDUCATION/TRAINING PROGRAM | Admitting: STUDENT IN AN ORGANIZED HEALTH CARE EDUCATION/TRAINING PROGRAM
Payer: COMMERCIAL

## 2024-06-05 VITALS
RESPIRATION RATE: 20 BRPM | TEMPERATURE: 98.2 F | HEART RATE: 96 BPM | WEIGHT: 247 LBS | DIASTOLIC BLOOD PRESSURE: 104 MMHG | OXYGEN SATURATION: 97 % | BODY MASS INDEX: 45.45 KG/M2 | SYSTOLIC BLOOD PRESSURE: 147 MMHG | HEIGHT: 62 IN

## 2024-06-05 DIAGNOSIS — R04.0 EPISTAXIS: ICD-10-CM

## 2024-06-05 PROCEDURE — 99283 EMERGENCY DEPT VISIT LOW MDM: CPT

## 2024-06-05 PROCEDURE — 250N000009 HC RX 250: Performed by: STUDENT IN AN ORGANIZED HEALTH CARE EDUCATION/TRAINING PROGRAM

## 2024-06-05 RX ORDER — OXYMETAZOLINE HYDROCHLORIDE 0.05 G/100ML
2 SPRAY NASAL ONCE
Status: COMPLETED | OUTPATIENT
Start: 2024-06-05 | End: 2024-06-05

## 2024-06-05 RX ORDER — LIDOCAINE HYDROCHLORIDE AND EPINEPHRINE 10; 10 MG/ML; UG/ML
1 INJECTION, SOLUTION INFILTRATION; PERINEURAL ONCE
Status: COMPLETED | OUTPATIENT
Start: 2024-06-05 | End: 2024-06-05

## 2024-06-05 RX ORDER — TRANEXAMIC ACID 100 MG/ML
500 INJECTION, SOLUTION INTRAVENOUS ONCE
Status: COMPLETED | OUTPATIENT
Start: 2024-06-05 | End: 2024-06-05

## 2024-06-05 RX ADMIN — OXYMETAZOLINE HYDROCHLORIDE 2 SPRAY: 0.05 SPRAY NASAL at 16:27

## 2024-06-05 RX ADMIN — LIDOCAINE HYDROCHLORIDE,EPINEPHRINE BITARTRATE 1 ML: 10; .01 INJECTION, SOLUTION INFILTRATION; PERINEURAL at 16:28

## 2024-06-05 ASSESSMENT — ACTIVITIES OF DAILY LIVING (ADL)
ADLS_ACUITY_SCORE: 43
ADLS_ACUITY_SCORE: 43

## 2024-06-05 NOTE — ED NOTES
"Assumed care while primary RN took break. Patient placed call light on and stated that her nose hurts, she said they used \"a few different sprays\" and now her whole nose is numb. She stated that she was ready to take the clamp off of her nose. Provider notified. No acute needs at this time.   "

## 2024-06-05 NOTE — DISCHARGE INSTRUCTIONS
"Discharge Instructions  Epistaxis    Today you were seen for a nosebleed.   Nosebleeds (the medical term is \"epistaxis\") are very common. Almost every person has had at least one in their lifetime.  Although the amount of blood loss can appear dramatic, nosebleeds rarely cause serious problems.  The most common causes are dry air or nose picking, but they also are common in people who have allergies, high blood pressure, or are on blood thinners (such as Coumadin, Aspirin or Plavix). If you or your child gets a nosebleed, the important thing is to know how to take care of it. With the right self-care, most nosebleeds will stop on their own.    Generally, every Emergency Department visit should have a follow-up clinic visit with either a primary or a specialty clinic/provider. Please follow-up as instructed by your emergency provider today.    Return to the Emergency Department if:  Your nose is bleeding a very large amount of blood and you are unable to stop it.  You get very pale, faint, or tired.  You cannot get the bleeding to stop after following these instructions.    Treatment:  Your provider may tell you to use a decongestant nose spray, like Afrin  (oxymetazoline), in both nostrils in the morning and at night for the next three days. Do not use this medicine for more than three days at a time.  If you do, it will cause nasal congestion.   Use a moisturizer. A small amount of Vaseline  to the inside of your nostrils for moisture before bed is one option. There are nasal sprays available over-the-counter for this purpose as well.  Using a humidifier in your bedroom or home will help as well when the air is dry.  For the next three days, do not blow your nose or put anything in your nose. You may sniffle, or dab the outside of your nose.  Do not bend with your head below your waist for the next three days. Do not lift anything so heavy that you have to strain.   If you received nasal packing, please do not " remove the packing until seen by an Ear, Nose, and Throat (ENT) specialist.  If antibiotics have been given with the packing, please take as directed.    If your nose starts to bleed again:  Blow your nose to get rid of some of the clots that have formed inside your nostrils. This may increase the bleeding temporarily, but that is okay.  Spray decongestant nose spray (like Afrin ) into both nostrils to constrict the blood vessels.  Sit or stand while bending forward slightly at the waist. Do not lie down or tilt your head back. This may cause you to swallow blood and can lead to vomiting.   the soft part of BOTH nostrils at the bottom of your nose and squeeze your nose closed for at least 5 minutes (for children) or 10 to 15 minutes (for adults). Use a clock to time yourself. Do not release the pressure every so often to check whether the bleeding has stopped. Many people hurt their chances of stopping the bleeding by releasing the pressure too soon or too often.    If you follow the steps outlined above, and your nose continues to bleed, repeat all the steps once more. Apply pressure for a total of at least 30 minutes. If you continue to bleed even then, seek medical attention.  If you were given a prescription for medicine here today, be sure to read all of the information (including the package insert) that comes with your prescription.  This will include important information about the medicine, its side effects, and any warnings that you need to know about.  The pharmacist who fills the prescription can provide more information and answer questions you may have about the medicine.  If you have questions or concerns that the pharmacist cannot address, please call or return to the Emergency Department.   Remember that you can always come back to the Emergency Department if you are not able to see your regular provider in the amount of time listed above, if you get any new symptoms, or if there is anything  that worries you.  Return to the emergency department if symptoms are worsening, become concerning, or for any other concerns. Follow-up with your doctor in 2-3 and sooner if needed.

## 2024-06-05 NOTE — ED TRIAGE NOTES
Pt presents via EMS for evaluation of intermittent epistaxis x 3 days. Is on eliquis for PEs. Hx of perforated septum. Bleeding in both nostrils. Also having clots. Yesterday, clot the size of a silver dollar. Has a nasal clamp in place since before EMS arrived. Denies injury to nose.

## 2024-06-05 NOTE — ED PROVIDER NOTES
Emergency Department Note      History of Present Illness     Chief Complaint  Epistaxis    HPI  Nevin Alvarado is a 33 year old female, on Eliquis, with history of pulmonary embolism, diabetes, borderline personality disorder, pica, and foreign body ingestion who presents for evaluation of epistaxis. The patient reports that she has had epistaxis for the past three days. Notes this is the first time her nose has ever bled and states there was not trauma or injury to cause the bleeding. States that she was seen in the Urgency Room yesterday after her nose bleed for 5 hours straight and after epistaxis care, she was discharged. Adds that this morning the bleeding began again and she has not been able to control it at home. Notes that she has a known idiopathic perforated nasal septum and is planning to have a procedure for it once her epistasis is under control. She was feeling lightheaded earlier but is not now. She denies picking her nose or putting any foreign bodies in her nose.     Independent Historian  None    Review of External Notes  I reviewed the patient's care plan which states she often puts metallic foreign bodies in her upper GI area.   I reviewed urgency room note from yesterday.    Past Medical History   Medical History and Problem List  ADD  Anorexia nervosa with bulimia  Anxiety  Asthma  Borderline personality disorder  Depression  Diabetes  Suicide attempt  Pulmonary embolism  Obesity  Neuropathy   PTSD  Rectal foreign body  Sleep apnea  Swallowed foreign body   Migraine   Fibroids  Intentional overdose  Pica  Red blood cell antibody positive   GERD  Endometriosis  Gallstones  Esophageal stricter  Foot drop, left  Chronic inflammatory demyelinating polyneuropathy     Medications  Albuterol  "  Benzonatate  Cetirizine  Clonazepam  Cyclobenzaprine  Dicyclomine  Ondansetron  Prednisone  Semaglutide  Eliquis  Escitalopram  Hydroxyzine  Montelukast  Norethindrone  Pantoprazole  Miralax  Valacyclovir     Surgical History   Abdominal surgery, foreign body removal  EGD, multiple  Tissue removal from knee, right  Laparoscopic ablation of endometriosis  Exploratory laparotomy  Lymph node removal from neck, benign  Mammoplasty reduction, bilateral   PICC line placement  Carpal tunnel release, bilateral  Sigmoidoscopy, foreign body removal    Physical Exam   Patient Vitals for the past 24 hrs:   BP Temp Temp src Pulse Resp SpO2 Height Weight   06/05/24 1413 (!) 147/104 98.2  F (36.8  C) Oral 96 20 97 % 1.575 m (5' 2\") 112 kg (247 lb)     Physical Exam  GENERAL: Patient well-appearing  HEAD: Atraumatic  EYES: Anicteric  NOSE: Chronic perforated nasal septum. Dry blood in nares left more than right, no active bleeding. No FB  MOUTH: Pharynx patent.  No bleeding.  NECK:  No rigidity  PULM: Speaking in full sentences without difficulty. No evidence of respiratory distress.  DERM: No visible rash.  EXTREMITY: Moving all extremities without difficulty.     Diagnostics   Lab Results   Labs Ordered and Resulted from Time of ED Arrival to Time of ED Departure - No data to display    Imaging  No orders to display     Independent Interpretation  None  ED Course    Medications Administered  Medications   lidocaine 1% with EPINEPHrine 1:100,000 injection 1 mL (1 mL Other $Given 6/5/24 1628)   tranexamic acid (CYKLOKAPRON) spray 500 mg (500 mg Both Nostrils Not Given 6/5/24 1628)   oxymetazoline (AFRIN) 0.05 % spray 2 spray (2 sprays Nasal $Given 6/5/24 1627)     Procedures  Procedures     Epistaxis Care     Procedure: Epistaxis Care    Indication: Epistaxis    Consent: Verbal    Medication: Patient was topically medicated with Topical lidocaine + afrin    Procedure detail: IN medication bilaterally  Patient was closely " monitored and did not have evidence of recurrent bleeding.     Patient Status: The patient tolerated the procedure well: Yes. There were no complications.      Discussion of Management  None    Social Determinants of Health adding to complexity of care  None    ED Course  ED Course as of 06/05/24 1651   Wed Jun 05, 2024   1427 I obtained history and examined the patient as noted above.    1435 I rechecked and updated the patient.      Medical Decision Making / Diagnosis   CMS Diagnoses: None    MIPS     None    MDM  Symptoms consistent with epistaxis.     Chronic conditions complicating -chronic perforated nasal septum.    On anticoagulation, but no active bleeding here, do not think we require anticoagulation reversal.    DDx considered mass, trauma or posterior bleed, however, evaluation not consistent with these etiologies and thus do not think imaging indicated.    Considered labs, however, do not see evidence of hemorrhage and thus not indicated.     Provided intranasal anesthetic + vasoconstriction. Applied continuous pressure.     Bleeding was controlled.     Patient was observed for a couple hours and had no return of bleeding and want to go home.  Patient reportedly to follow-up with ENT.    I have evaluated the patient for acute medical emergencies and have clinically decided no further acute medical interventions are required.     Reassessed multiple times and well appearing.    Patient stable for discharge. All questions answered. Given strict return precautions. Patient content with plan. The differential diagnosis and treatment modalities were discussed thoroughly with the patient. Recommended PCP follow-up in 2-3 days.      Disposition  The patient was discharged.     ICD-10 Codes:    ICD-10-CM    1. Epistaxis  R04.0            Scribe Disclosure:  Jaki MURRAY, am serving as a scribe at 4:51 PM on 6/5/2024 to document services personally performed by Aquilino Srinivasan MD based on my observations  and the provider's statements to me.        Aquilino Srinivasan MD  06/05/24 2620

## 2024-06-06 ENCOUNTER — VIRTUAL VISIT (OUTPATIENT)
Dept: ENDOCRINOLOGY | Facility: CLINIC | Age: 34
End: 2024-06-06
Payer: COMMERCIAL

## 2024-06-06 VITALS — WEIGHT: 246 LBS | BODY MASS INDEX: 45.27 KG/M2 | HEIGHT: 62 IN

## 2024-06-06 DIAGNOSIS — E66.813 CLASS 3 SEVERE OBESITY WITH SERIOUS COMORBIDITY AND BODY MASS INDEX (BMI) OF 50.0 TO 59.9 IN ADULT, UNSPECIFIED OBESITY TYPE (H): Primary | ICD-10-CM

## 2024-06-06 DIAGNOSIS — E11.9 TYPE 2 DIABETES MELLITUS WITHOUT COMPLICATION, WITHOUT LONG-TERM CURRENT USE OF INSULIN (H): ICD-10-CM

## 2024-06-06 DIAGNOSIS — E66.01 CLASS 3 SEVERE OBESITY WITH SERIOUS COMORBIDITY AND BODY MASS INDEX (BMI) OF 50.0 TO 59.9 IN ADULT, UNSPECIFIED OBESITY TYPE (H): Primary | ICD-10-CM

## 2024-06-06 PROCEDURE — 99214 OFFICE O/P EST MOD 30 MIN: CPT | Mod: 95 | Performed by: NURSE PRACTITIONER

## 2024-06-06 ASSESSMENT — PAIN SCALES - GENERAL: PAINLEVEL: NO PAIN (0)

## 2024-06-06 NOTE — PROGRESS NOTES
Return Medical Weight Management Note     Nevin Alvarado  MRN:  [id number]  :  1990  MOR:  2024    Dear Latonya Knight MD,    I had the pleasure of seeing your patient Nevin Alvarado. She is a 33 year old female who I am continuing to see for treatment of obesity related to:        2023    12:48 PM   --   I have the following health issues associated with obesity Type II Diabetes    High Blood Pressure    Sleep Apnea    Polycystic Ovarian Syndrome    GERD (Reflux)    Fatty Liver    Asthma    Stress Incontinence       Assessment & Plan   Problem List Items Addressed This Visit          Digestive    Class 3 severe obesity with serious comorbidity and body mass index (BMI) of 50.0 to 59.9 in adult, unspecified obesity type (H) - Primary    Relevant Medications    Semaglutide, 2 MG/DOSE, (OZEMPIC) 8 MG/3ML pen       Endocrine    Type 2 diabetes mellitus without complication, without long-term current use of insulin (H)    Relevant Medications    Semaglutide, 2 MG/DOSE, (OZEMPIC) 8 MG/3ML pen          INTERVAL HISTORY:  [facility] 2023 BMI 56.5 with DMII, ZOHRA, GERD, and hx of PE. Mental health struggles contributing to weight gain including weight gain associated with medications. This was complicated by numerous episodes of swallowing non food items which has lead to perforations of the esophagus and strictures for which she is followed by GI. Had been stable for several months prior to a relapse just before our consult. We discussed mwm to start given increased risks after bariatric surgery to the stomach. Initially switched rybelsus for DMII to wegovy for better efficacy. Due to insurance change she is now taking ozempic.     Frequent URI over the winter complicated her progress on weight loss. Change in appetite, impact to mental health, deconditioning. Has been working with PT/ OT to recover.       Anti-obesity medication history    Current:   Ozempic 1mg   - no constipation/  diarrhea  - no heart burn/ nausea     Past/Failed/contraindicated:   Failed metformin, rybelsus     Recent diet changes:   Breakfast: 20oz coffee and breakfast (stuffed hash browns, egg bake)   Lunch: PBJ OR Pizza OR leftovers  Dinner: something cooked at home   Maybe snacking once in the evenings     Recent exercise/activity changes:   Continues PT- feeling stronger, able to be more active     Recent stressors:   Frequent nose bleeds - on blood thinners, seeing ENT  Mental health reported to be really stable  Did psych eval to eval for surgery- need to look for report which she was told was faxed     Recent sleep changes: can't tolerate CPAP with epistaxis episodes     Vitamins/Labs: bmp done 5/2024   Repeat A1C at next visit     CURRENT WEIGHT:   246 lbs 0 oz    Initial Weight (lbs): 309 lbs  Last Visits Weight: 112.9 kg (249 lb)  Cumulative weight loss (lbs): 63  Weight Loss Percentage: 20.39%        1/5/2024     1:00 PM   Changes and Difficulties   I have made the following changes to my diet since my last visit: been sick, so no appetite   I have made the following changes to my activity/exercise since my last visit: none         MEDICATIONS:   Current Outpatient Medications   Medication Sig Dispense Refill    Semaglutide, 2 MG/DOSE, (OZEMPIC) 8 MG/3ML pen Inject 2 mg Subcutaneous every 7 days 9 mL 1    acetaminophen (TYLENOL) 500 MG tablet Take 2 tablets (1,000 mg) by mouth every 6 hours as needed for pain or fever 60 tablet 0    albuterol (PROAIR HFA/PROVENTIL HFA/VENTOLIN HFA) 108 (90 Base) MCG/ACT inhaler Inhale 2 puffs into the lungs every 6 hours as needed for shortness of breath / dyspnea or wheezing 18 g 0    albuterol (PROVENTIL) (2.5 MG/3ML) 0.083% neb solution Take 1 vial (2.5 mg) by nebulization every 6 hours as needed for shortness of breath or wheezing 90 mL 0    Apixaban Starter Pack (ELIQUIS DVT/PE STARTER PACK) 5 MG TBPK Take 2 tablets (10 mg) by mouth twice daily for 7 days then take 1 tablet  (5 mg) twice daily thereafter      benzonatate (TESSALON) 100 MG capsule Take 1 capsule (100 mg) by mouth 3 times daily as needed for cough 12 capsule 0    cetirizine (ZYRTEC) 10 MG tablet Take 1 tablet (10 mg) by mouth daily 30 tablet 0    Cholecalciferol (D3 HIGH POTENCY) 25 MCG (1000 UT) CAPS Take 50 mcg by mouth daily      clonazePAM (KLONOPIN) 0.5 MG tablet Take 1 tablet (0.5 mg) by mouth daily as needed for anxiety 7 tablet 0    cyclobenzaprine (FLEXERIL) 10 MG tablet Take 1 tablet (10 mg) by mouth 3 times daily as needed for muscle spasms 20 tablet 0    dicyclomine (BENTYL) 10 MG capsule Take 1 capsule (10 mg) by mouth daily as needed (Abdominal Cramping) 10 capsule 1    escitalopram (LEXAPRO) 10 MG tablet Take 10 mg by mouth      ferrous sulfate (FEROSUL) 325 (65 Fe) MG tablet Take 1 tablet (325 mg) by mouth daily (with breakfast) 30 tablet 0    hydrOXYzine HCl (ATARAX) 25 MG tablet Take 25 mg by mouth      montelukast (SINGULAIR) 10 MG tablet Take 10 mg by mouth every evening      norethindrone (AYGESTIN) 5 MG tablet Take 5 mg by mouth daily      ondansetron (ZOFRAN-ODT) 4 MG ODT tab Take 1 tablet (4 mg) by mouth every 8 hours as needed for nausea 15 tablet 0    pantoprazole (PROTONIX) 40 MG EC tablet Take 40 mg by mouth daily      polyethylene glycol (MIRALAX) 17 GM/Dose powder Take 17 g by mouth daily as needed for constipation      PSYLLIUM PO Take 1 tsp by mouth daily      Respiratory Therapy Supplies (NEBULIZER) BRENDAN Nebulizer device.  Albuterol nebulization every 2 hours as needed for shortness of breath or wheezing. 1 each 0    Semaglutide, 1 MG/DOSE, (OZEMPIC) 4 MG/3ML pen Inject 1 mg Subcutaneous every 7 days 3 mL 1    valACYclovir (VALTREX) 1000 mg tablet Take 2,000 mg by mouth 2 times daily as needed Take 2 tablets by mouth two times daily for one day. Use as needed at onset of cold sore.             1/5/2024     1:00 PM   Weight Loss Medication History Reviewed With Patient   Which weight loss  medications are you currently taking on a regular basis? Wegovy   Are you having any side effects from the weight loss medication that we have prescribed you? No       Admission on 05/21/2024, Discharged on 05/22/2024   Component Date Value Ref Range Status    Hold Specimen 05/21/2024 [facility]   Final    Hold Specimen 05/21/2024 [facility]   Final    Hold Specimen 05/21/2024 [facility]   Final    Hold Specimen 05/21/2024 [facility]   Final    Sodium 05/21/2024 143  135 - 145 mmol/L Final    Reference intervals for this test were updated on 09/26/2023 to more accurately reflect our healthy population. There may be differences in the flagging of prior results with similar values performed with this method. Interpretation of those prior results can be made in the context of the updated reference intervals.     Potassium 05/21/2024 4.0  3.4 - 5.3 mmol/L Final    Chloride 05/21/2024 107  98 - 107 mmol/L Final    Carbon Dioxide (CO2) 05/21/2024 23  22 - 29 mmol/L Final    Anion Gap 05/21/2024 13  7 - 15 mmol/L Final    Urea Nitrogen 05/21/2024 9.3  6.0 - 20.0 mg/dL Final    Creatinine 05/21/2024 0.57  0.51 - 0.95 mg/dL Final    GFR Estimate 05/21/2024 >90  >60 mL/min/1.73m2 Final    Calcium 05/21/2024 9.8  8.6 - 10.0 mg/dL Final    Glucose 05/21/2024 114 (H)  70 - 99 mg/dL Final    WBC Count 05/21/2024 11.1 (H)  4.0 - 11.0 10e3/uL Final    RBC Count 05/21/2024 4.75  3.80 - 5.20 10e6/uL Final    Hemoglobin 05/21/2024 14.5  11.7 - 15.7 g/dL Final    Hematocrit 05/21/2024 43.4  35.0 - 47.0 % Final    MCV 05/21/2024 91  78 - 100 fL Final    MCH 05/21/2024 30.5  26.5 - 33.0 pg Final    MCHC 05/21/2024 33.4  31.5 - 36.5 g/dL Final    RDW 05/21/2024 13.9  10.0 - 15.0 % Final    Platelet Count 05/21/2024 335  150 - 450 10e3/uL Final    % Neutrophils 05/21/2024 72  % Final    % Lymphocytes 05/21/2024 20  % Final    % Monocytes 05/21/2024 8  % Final    % Eosinophils 05/21/2024 0  % Final    % Basophils 05/21/2024 0  % Final    % Immature Granulocytes  "05/21/2024 0  % Final    NRBCs per 100 WBC 05/21/2024 0  <1 /100 Final    Absolute Neutrophils 05/21/2024 7.9  1.6 - 8.3 10e3/uL Final    Absolute Lymphocytes 05/21/2024 2.3  0.8 - 5.3 10e3/uL Final    Absolute Monocytes 05/21/2024 0.9  0.0 - 1.3 10e3/uL Final    Absolute Eosinophils 05/21/2024 0.0  0.0 - 0.7 10e3/uL Final    Absolute Basophils 05/21/2024 0.0  0.0 - 0.2 10e3/uL Final    Absolute Immature Granulocytes 05/21/2024 0.0  <=0.4 10e3/uL Final    Absolute NRBCs 05/21/2024 0.0  10e3/uL Final           9/13/2023    10:26 AM   BRIAN Score (Last Two)   BRIAN Raw Score 37   Activation Score 79.2   BRIAN Level 4         PHYSICAL EXAM:  Objective    Ht 1.575 m (5' 2\")   Wt 111.6 kg (246 lb)   LMP 04/29/2024 (Approximate)   BMI 44.99 kg/m               GENERAL: alert and no distress  EYES: Eyes grossly normal to inspection.  No discharge or erythema, or obvious scleral/conjunctival abnormalities.  RESP: No audible wheeze, cough, or visible cyanosis.    SKIN: Visible skin clear. No significant rash, abnormal pigmentation or lesions.  NEURO: Cranial nerves grossly intact.  Mentation and speech appropriate for age.  PSYCH: Appropriate affect, tone, and pace of words        Sincerely,    Sharon Toro NP      30 minutes spent by me on the date of the encounter doing chart review, history and exam, documentation and further activities per the note    Virtual Visit Details    Type of service:  Video Visit   Video Start Time:  1206  Video End Time: 1227    Originating Location (pt. Location): Home    Distant Location (provider location):  Off-site  Platform used for Video Visit: Thuy"

## 2024-06-06 NOTE — LETTER
2024       RE: Nevin Alvarado  [address]  [facility] [place] [address]     Dear Colleague,    Thank you for referring your patient, Nevin Alvarado, to the [facility] [place] at [facility]. Please see a copy of my visit note below.      Return Medical Weight Management Note     Nevin Alvarado  MRN:  [id number]  :  1990  MOR:  2024    Dear Latonya Knight MD,    I had the pleasure of seeing your patient Nevin Alvarado. She is a 33 year old female who I am continuing to see for treatment of obesity related to:        2023    12:48 PM   --   I have the following health issues associated with obesity Type II Diabetes    High Blood Pressure    Sleep Apnea    Polycystic Ovarian Syndrome    GERD (Reflux)    Fatty Liver    Asthma    Stress Incontinence       Assessment & Plan  Problem List Items Addressed This Visit          Digestive    Class 3 severe obesity with serious comorbidity and body mass index (BMI) of 50.0 to 59.9 in adult, unspecified obesity type (H) - Primary    Relevant Medications    Semaglutide, 2 MG/DOSE, (OZEMPIC) 8 MG/3ML pen       Endocrine    Type 2 diabetes mellitus without complication, without long-term current use of insulin (H)    Relevant Medications    Semaglutide, 2 MG/DOSE, (OZEMPIC) 8 MG/3ML pen          INTERVAL HISTORY:  [facility] 2023 BMI 56.5 with DMII, ZOHRA, GERD, and hx of PE. Mental health struggles contributing to weight gain including weight gain associated with medications. This was complicated by numerous episodes of swallowing non food items which has lead to perforations of the esophagus and strictures for which she is followed by GI. Had been stable for several months prior to a relapse just before our consult. We discussed mwm to start given increased risks after bariatric surgery to the stomach. Initially switched rybelsus for DMII to wegovy  for better efficacy. Due to insurance change she is now taking ozempic.     Frequent URI over the winter complicated her progress on weight loss. Change in appetite, impact to mental health, deconditioning. Has been working with PT/ OT to recover.       Anti-obesity medication history    Current:   Ozempic 1mg   - no constipation/ diarrhea  - no heart burn/ nausea     Past/Failed/contraindicated:   Failed metformin, rybelsus     Recent diet changes:   Breakfast: 20oz coffee and breakfast (stuffed hash browns, egg bake)   Lunch: PBJ OR Pizza OR leftovers  Dinner: something cooked at home   Maybe snacking once in the evenings     Recent exercise/activity changes:   Continues PT- feeling stronger, able to be more active     Recent stressors:   Frequent nose bleeds - on blood thinners, seeing ENT  Mental health reported to be really stable  Did psych eval to eval for surgery- need to look for report which she was told was faxed     Recent sleep changes: can't tolerate CPAP with epistaxis episodes     Vitamins/Labs: bmp done 5/2024   Repeat A1C at next visit     CURRENT WEIGHT:   246 lbs 0 oz    Initial Weight (lbs): 309 lbs  Last Visits Weight: 112.9 kg (249 lb)  Cumulative weight loss (lbs): 63  Weight Loss Percentage: 20.39%        1/5/2024     1:00 PM   Changes and Difficulties   I have made the following changes to my diet since my last visit: been sick, so no appetite   I have made the following changes to my activity/exercise since my last visit: none         MEDICATIONS:   Current Outpatient Medications   Medication Sig Dispense Refill    Semaglutide, 2 MG/DOSE, (OZEMPIC) 8 MG/3ML pen Inject 2 mg Subcutaneous every 7 days 9 mL 1    acetaminophen (TYLENOL) 500 MG tablet Take 2 tablets (1,000 mg) by mouth every 6 hours as needed for pain or fever 60 tablet 0    albuterol (PROAIR HFA/PROVENTIL HFA/VENTOLIN HFA) 108 (90 Base) MCG/ACT inhaler Inhale 2 puffs into the lungs every 6 hours as needed for shortness of  breath / dyspnea or wheezing 18 g 0    albuterol (PROVENTIL) (2.5 MG/3ML) 0.083% neb solution Take 1 vial (2.5 mg) by nebulization every 6 hours as needed for shortness of breath or wheezing 90 mL 0    Apixaban Starter Pack (ELIQUIS DVT/PE STARTER PACK) 5 MG TBPK Take 2 tablets (10 mg) by mouth twice daily for 7 days then take 1 tablet (5 mg) twice daily thereafter      benzonatate (TESSALON) 100 MG capsule Take 1 capsule (100 mg) by mouth 3 times daily as needed for cough 12 capsule 0    cetirizine (ZYRTEC) 10 MG tablet Take 1 tablet (10 mg) by mouth daily 30 tablet 0    Cholecalciferol (D3 HIGH POTENCY) 25 MCG (1000 UT) CAPS Take 50 mcg by mouth daily      clonazePAM (KLONOPIN) 0.5 MG tablet Take 1 tablet (0.5 mg) by mouth daily as needed for anxiety 7 tablet 0    cyclobenzaprine (FLEXERIL) 10 MG tablet Take 1 tablet (10 mg) by mouth 3 times daily as needed for muscle spasms 20 tablet 0    dicyclomine (BENTYL) 10 MG capsule Take 1 capsule (10 mg) by mouth daily as needed (Abdominal Cramping) 10 capsule 1    escitalopram (LEXAPRO) 10 MG tablet Take 10 mg by mouth      ferrous sulfate (FEROSUL) 325 (65 Fe) MG tablet Take 1 tablet (325 mg) by mouth daily (with breakfast) 30 tablet 0    hydrOXYzine HCl (ATARAX) 25 MG tablet Take 25 mg by mouth      montelukast (SINGULAIR) 10 MG tablet Take 10 mg by mouth every evening      norethindrone (AYGESTIN) 5 MG tablet Take 5 mg by mouth daily      ondansetron (ZOFRAN-ODT) 4 MG ODT tab Take 1 tablet (4 mg) by mouth every 8 hours as needed for nausea 15 tablet 0    pantoprazole (PROTONIX) 40 MG EC tablet Take 40 mg by mouth daily      polyethylene glycol (MIRALAX) 17 GM/Dose powder Take 17 g by mouth daily as needed for constipation      PSYLLIUM PO Take 1 tsp by mouth daily      Respiratory Therapy Supplies (NEBULIZER) BRENDAN Nebulizer device.  Albuterol nebulization every 2 hours as needed for shortness of breath or wheezing. 1 each 0    Semaglutide, 1 MG/DOSE, (OZEMPIC) 4  MG/3ML pen Inject 1 mg Subcutaneous every 7 days 3 mL 1    valACYclovir (VALTREX) 1000 mg tablet Take 2,000 mg by mouth 2 times daily as needed Take 2 tablets by mouth two times daily for one day. Use as needed at onset of cold sore.             1/5/2024     1:00 PM   Weight Loss Medication History Reviewed With Patient   Which weight loss medications are you currently taking on a regular basis? Wegovy   Are you having any side effects from the weight loss medication that we have prescribed you? No       Admission on 05/21/2024, Discharged on 05/22/2024   Component Date Value Ref Range Status    Hold Specimen 05/21/2024 [facility]   Final    Hold Specimen 05/21/2024 [facility]   Final    Hold Specimen 05/21/2024 [facility]   Final    Hold Specimen 05/21/2024 [facility]   Final    Sodium 05/21/2024 143  135 - 145 mmol/L Final    Reference intervals for this test were updated on 09/26/2023 to more accurately reflect our healthy population. There may be differences in the flagging of prior results with similar values performed with this method. Interpretation of those prior results can be made in the context of the updated reference intervals.     Potassium 05/21/2024 4.0  3.4 - 5.3 mmol/L Final    Chloride 05/21/2024 107  98 - 107 mmol/L Final    Carbon Dioxide (CO2) 05/21/2024 23  22 - 29 mmol/L Final    Anion Gap 05/21/2024 13  7 - 15 mmol/L Final    Urea Nitrogen 05/21/2024 9.3  6.0 - 20.0 mg/dL Final    Creatinine 05/21/2024 0.57  0.51 - 0.95 mg/dL Final    GFR Estimate 05/21/2024 >90  >60 mL/min/1.73m2 Final    Calcium 05/21/2024 9.8  8.6 - 10.0 mg/dL Final    Glucose 05/21/2024 114 (H)  70 - 99 mg/dL Final    WBC Count 05/21/2024 11.1 (H)  4.0 - 11.0 10e3/uL Final    RBC Count 05/21/2024 4.75  3.80 - 5.20 10e6/uL Final    Hemoglobin 05/21/2024 14.5  11.7 - 15.7 g/dL Final    Hematocrit 05/21/2024 43.4  35.0 - 47.0 % Final    MCV 05/21/2024 91  78 - 100 fL Final    MCH 05/21/2024 30.5  26.5 - 33.0 pg Final    MCHC 05/21/2024 33.4  31.5 -  "36.5 g/dL Final    RDW 05/21/2024 13.9  10.0 - 15.0 % Final    Platelet Count 05/21/2024 335  150 - 450 10e3/uL Final    % Neutrophils 05/21/2024 72  % Final    % Lymphocytes 05/21/2024 20  % Final    % Monocytes 05/21/2024 8  % Final    % Eosinophils 05/21/2024 0  % Final    % Basophils 05/21/2024 0  % Final    % Immature Granulocytes 05/21/2024 0  % Final    NRBCs per 100 WBC 05/21/2024 0  <1 /100 Final    Absolute Neutrophils 05/21/2024 7.9  1.6 - 8.3 10e3/uL Final    Absolute Lymphocytes 05/21/2024 2.3  0.8 - 5.3 10e3/uL Final    Absolute Monocytes 05/21/2024 0.9  0.0 - 1.3 10e3/uL Final    Absolute Eosinophils 05/21/2024 0.0  0.0 - 0.7 10e3/uL Final    Absolute Basophils 05/21/2024 0.0  0.0 - 0.2 10e3/uL Final    Absolute Immature Granulocytes 05/21/2024 0.0  <=0.4 10e3/uL Final    Absolute NRBCs 05/21/2024 0.0  10e3/uL Final           9/13/2023    10:26 AM   BRIAN Score (Last Two)   BRIAN Raw Score 37   Activation Score 79.2   BRIAN Level 4         PHYSICAL EXAM:  Objective   Ht 1.575 m (5' 2\")   Wt 111.6 kg (246 lb)   LMP 04/29/2024 (Approximate)   BMI 44.99 kg/m      Vitals - Patient Reported  Pain Score: No Pain (0)    GENERAL: alert and no distress  EYES: Eyes grossly normal to inspection.  No discharge or erythema, or obvious scleral/conjunctival abnormalities.  RESP: No audible wheeze, cough, or visible cyanosis.    SKIN: Visible skin clear. No significant rash, abnormal pigmentation or lesions.  NEURO: Cranial nerves grossly intact.  Mentation and speech appropriate for age.  PSYCH: Appropriate affect, tone, and pace of words    Sincerely,    Sharon Toro NP      30 minutes spent by me on the date of the encounter doing chart review, history and exam, documentation and further activities per the note    Virtual Visit Details    Type of service:  Video Visit   Video Start Time:  1206  Video End Time: 1227    Originating Location (pt. Location): Home    Distant Location (provider location):  " Off-site  Platform used for Video Visit: Thuy

## 2024-06-06 NOTE — NURSING NOTE
Is the patient currently in the state of [place]? YES    Visit mode:VIDEO    If the visit is dropped, the patient can be reconnected by: VIDEO VISIT: Text to cell phone:   Telephone Information:   Mobile [phone]       Will anyone else be joining the visit? NO  (If patient encounters technical issues they should call [phone]    How would you like to obtain your AVS? MyChart    Are changes needed to the allergy or medication list? Pt stated no changes to allergies and Pt stated no med changes    Are refills needed on medications prescribed by this physician? NO    Reason for visit: ARMIDA ACOSTAF

## 2024-06-06 NOTE — Clinical Note
Anyone see a psych eval from Tae Nickerson? I can't find it. Patient said it was done around Jan? Thanks!

## 2024-06-08 ENCOUNTER — HEALTH MAINTENANCE LETTER (OUTPATIENT)
Age: 34
End: 2024-06-08

## 2024-06-14 ENCOUNTER — TELEPHONE (OUTPATIENT)
Dept: ENDOCRINOLOGY | Facility: CLINIC | Age: 34
End: 2024-06-14
Payer: COMMERCIAL

## 2024-06-14 NOTE — TELEPHONE ENCOUNTER
Patient confirmed scheduled appointment:  Date: 08/15/2024  Time: 1200 pm   Visit type: RET IVAN   Provider: Sharon Toro NP   Location: virtual   Testing/imaging: n/a  Additional notes: OK reva Herrera to use NBS

## 2024-06-27 ENCOUNTER — HOSPITAL ENCOUNTER (EMERGENCY)
Facility: CLINIC | Age: 34
Discharge: LEFT BEFORE DISCHARGE | End: 2024-06-27
Admitting: STUDENT IN AN ORGANIZED HEALTH CARE EDUCATION/TRAINING PROGRAM
Payer: COMMERCIAL

## 2024-06-27 VITALS
DIASTOLIC BLOOD PRESSURE: 94 MMHG | TEMPERATURE: 98.4 F | OXYGEN SATURATION: 97 % | SYSTOLIC BLOOD PRESSURE: 142 MMHG | HEART RATE: 98 BPM | RESPIRATION RATE: 18 BRPM

## 2024-06-27 PROCEDURE — 99281 EMR DPT VST MAYX REQ PHY/QHP: CPT

## 2024-06-27 NOTE — ED NOTES
"Pt requesting to leave. Pt alert, ambulatory with steady gait and denies suicidal ideation or thoughts of self harm. Author attempted to call guardian; left message with callback number. Pt signed declination of medical screening exam stating \"this is taking too long.\"  "

## 2024-06-27 NOTE — ED TRIAGE NOTES
Pt arrives via EMS with c/o worsening chronic pain over the last 2-3 weeks. Pt reports area of her body where the pain is worse has been changing throughout the day but today she woke up and everything hurt at the same time. No meds PTA.     Triage Assessment (Adult)       Row Name 06/27/24 0949          Triage Assessment    Airway WDL WDL        Respiratory WDL    Respiratory WDL WDL        Skin Circulation/Temperature WDL    Skin Circulation/Temperature WDL WDL        Cardiac WDL    Cardiac WDL WDL        Peripheral/Neurovascular WDL    Peripheral Neurovascular WDL WDL        Cognitive/Neuro/Behavioral WDL    Cognitive/Neuro/Behavioral WDL WDL

## 2024-07-12 PROCEDURE — 99285 EMERGENCY DEPT VISIT HI MDM: CPT | Mod: 25

## 2024-07-12 PROCEDURE — 96375 TX/PRO/DX INJ NEW DRUG ADDON: CPT

## 2024-07-12 PROCEDURE — 83605 ASSAY OF LACTIC ACID: CPT | Performed by: EMERGENCY MEDICINE

## 2024-07-12 PROCEDURE — 96376 TX/PRO/DX INJ SAME DRUG ADON: CPT

## 2024-07-12 PROCEDURE — 85025 COMPLETE CBC W/AUTO DIFF WBC: CPT | Performed by: EMERGENCY MEDICINE

## 2024-07-12 PROCEDURE — 84703 CHORIONIC GONADOTROPIN ASSAY: CPT | Performed by: EMERGENCY MEDICINE

## 2024-07-12 PROCEDURE — 96374 THER/PROPH/DIAG INJ IV PUSH: CPT | Mod: 59

## 2024-07-12 PROCEDURE — 80053 COMPREHEN METABOLIC PANEL: CPT | Performed by: EMERGENCY MEDICINE

## 2024-07-12 PROCEDURE — 36415 COLL VENOUS BLD VENIPUNCTURE: CPT | Performed by: EMERGENCY MEDICINE

## 2024-07-13 ENCOUNTER — HOSPITAL ENCOUNTER (EMERGENCY)
Facility: CLINIC | Age: 34
Discharge: HOME | End: 2024-07-13
Attending: EMERGENCY MEDICINE | Admitting: EMERGENCY MEDICINE
Payer: COMMERCIAL

## 2024-07-13 ENCOUNTER — APPOINTMENT (OUTPATIENT)
Dept: CT IMAGING | Facility: CLINIC | Age: 34
End: 2024-07-13
Attending: EMERGENCY MEDICINE
Payer: COMMERCIAL

## 2024-07-13 VITALS
HEART RATE: 107 BPM | OXYGEN SATURATION: 96 % | DIASTOLIC BLOOD PRESSURE: 79 MMHG | SYSTOLIC BLOOD PRESSURE: 131 MMHG | TEMPERATURE: 97.4 F | RESPIRATION RATE: 20 BRPM

## 2024-07-13 DIAGNOSIS — R10.9 FLANK PAIN: ICD-10-CM

## 2024-07-13 LAB
ALBUMIN SERPL BCG-MCNC: 4.2 G/DL (ref 3.5–5.2)
ALBUMIN UR-MCNC: NEGATIVE MG/DL
ALP SERPL-CCNC: 82 U/L (ref 40–150)
ALT SERPL W P-5'-P-CCNC: 19 U/L (ref 0–50)
ANION GAP SERPL CALCULATED.3IONS-SCNC: 14 MMOL/L (ref 7–15)
APPEARANCE UR: CLEAR
AST SERPL W P-5'-P-CCNC: 22 U/L (ref 0–45)
BASOPHILS # BLD AUTO: 0.1 10E3/UL (ref 0–0.2)
BASOPHILS NFR BLD AUTO: 0 %
BILIRUB SERPL-MCNC: 0.3 MG/DL
BILIRUB UR QL STRIP: NEGATIVE
BUN SERPL-MCNC: 10.8 MG/DL (ref 6–20)
CALCIUM SERPL-MCNC: 9.3 MG/DL (ref 8.6–10)
CHLORIDE SERPL-SCNC: 103 MMOL/L (ref 98–107)
COLOR UR AUTO: NORMAL
CREAT SERPL-MCNC: 0.63 MG/DL (ref 0.51–0.95)
DEPRECATED HCO3 PLAS-SCNC: 21 MMOL/L (ref 22–29)
EGFRCR SERPLBLD CKD-EPI 2021: >90 ML/MIN/1.73M2
EOSINOPHIL # BLD AUTO: 0.2 10E3/UL (ref 0–0.7)
EOSINOPHIL NFR BLD AUTO: 1 %
ERYTHROCYTE [DISTWIDTH] IN BLOOD BY AUTOMATED COUNT: 12.6 % (ref 10–15)
GLUCOSE SERPL-MCNC: 142 MG/DL (ref 70–99)
GLUCOSE UR STRIP-MCNC: NEGATIVE MG/DL
HCG SERPL QL: NEGATIVE
HCT VFR BLD AUTO: 43.3 % (ref 35–47)
HGB BLD-MCNC: 14.6 G/DL (ref 11.7–15.7)
HGB UR QL STRIP: NEGATIVE
HOLD SPECIMEN: NORMAL
HOLD SPECIMEN: NORMAL
IMM GRANULOCYTES # BLD: 0.1 10E3/UL
IMM GRANULOCYTES NFR BLD: 0 %
KETONES UR STRIP-MCNC: NEGATIVE MG/DL
LACTATE SERPL-SCNC: 1.3 MMOL/L (ref 0.7–2)
LEUKOCYTE ESTERASE UR QL STRIP: NEGATIVE
LIPASE SERPL-CCNC: 37 U/L (ref 13–60)
LYMPHOCYTES # BLD AUTO: 3.1 10E3/UL (ref 0.8–5.3)
LYMPHOCYTES NFR BLD AUTO: 20 %
MCH RBC QN AUTO: 30.4 PG (ref 26.5–33)
MCHC RBC AUTO-ENTMCNC: 33.7 G/DL (ref 31.5–36.5)
MCV RBC AUTO: 90 FL (ref 78–100)
MONOCYTES # BLD AUTO: 0.8 10E3/UL (ref 0–1.3)
MONOCYTES NFR BLD AUTO: 5 %
NEUTROPHILS # BLD AUTO: 11.1 10E3/UL (ref 1.6–8.3)
NEUTROPHILS NFR BLD AUTO: 73 %
NITRATE UR QL: NEGATIVE
NRBC # BLD AUTO: 0 10E3/UL
NRBC BLD AUTO-RTO: 0 /100
PH UR STRIP: 7 [PH] (ref 5–7)
PLATELET # BLD AUTO: 309 10E3/UL (ref 150–450)
POTASSIUM SERPL-SCNC: 4.6 MMOL/L (ref 3.4–5.3)
PROT SERPL-MCNC: 7.2 G/DL (ref 6.4–8.3)
RBC # BLD AUTO: 4.81 10E6/UL (ref 3.8–5.2)
RBC URINE: 0 /HPF
SODIUM SERPL-SCNC: 138 MMOL/L (ref 135–145)
SP GR UR STRIP: 1.01 (ref 1–1.03)
SQUAMOUS EPITHELIAL: <1 /HPF
UROBILINOGEN UR STRIP-MCNC: NORMAL MG/DL
WBC # BLD AUTO: 15.2 10E3/UL (ref 4–11)
WBC URINE: <1 /HPF

## 2024-07-13 PROCEDURE — 96375 TX/PRO/DX INJ NEW DRUG ADDON: CPT

## 2024-07-13 PROCEDURE — 250N000011 HC RX IP 250 OP 636: Performed by: EMERGENCY MEDICINE

## 2024-07-13 PROCEDURE — 96376 TX/PRO/DX INJ SAME DRUG ADON: CPT

## 2024-07-13 PROCEDURE — 74177 CT ABD & PELVIS W/CONTRAST: CPT

## 2024-07-13 PROCEDURE — 36415 COLL VENOUS BLD VENIPUNCTURE: CPT | Performed by: EMERGENCY MEDICINE

## 2024-07-13 PROCEDURE — 81001 URINALYSIS AUTO W/SCOPE: CPT | Performed by: EMERGENCY MEDICINE

## 2024-07-13 PROCEDURE — 96374 THER/PROPH/DIAG INJ IV PUSH: CPT | Mod: 59

## 2024-07-13 PROCEDURE — 83690 ASSAY OF LIPASE: CPT | Performed by: EMERGENCY MEDICINE

## 2024-07-13 RX ORDER — MORPHINE SULFATE 4 MG/ML
4 INJECTION, SOLUTION INTRAMUSCULAR; INTRAVENOUS ONCE
Status: COMPLETED | OUTPATIENT
Start: 2024-07-13 | End: 2024-07-13

## 2024-07-13 RX ORDER — DIPHENHYDRAMINE HYDROCHLORIDE 50 MG/ML
25 INJECTION INTRAMUSCULAR; INTRAVENOUS ONCE
Status: COMPLETED | OUTPATIENT
Start: 2024-07-13 | End: 2024-07-13

## 2024-07-13 RX ORDER — LIDOCAINE 4 G/G
1 PATCH TOPICAL EVERY 24 HOURS
Qty: 7 PATCH | Refills: 0 | Status: SHIPPED | OUTPATIENT
Start: 2024-07-13 | End: 2024-07-20

## 2024-07-13 RX ORDER — IOPAMIDOL 755 MG/ML
500 INJECTION, SOLUTION INTRAVASCULAR ONCE
Status: COMPLETED | OUTPATIENT
Start: 2024-07-13 | End: 2024-07-13

## 2024-07-13 RX ADMIN — MORPHINE SULFATE 4 MG: 4 INJECTION, SOLUTION INTRAMUSCULAR; INTRAVENOUS at 02:40

## 2024-07-13 RX ADMIN — MORPHINE SULFATE 4 MG: 4 INJECTION INTRAVENOUS at 05:15

## 2024-07-13 RX ADMIN — IOPAMIDOL 100 ML: 755 INJECTION, SOLUTION INTRAVENOUS at 04:18

## 2024-07-13 RX ADMIN — DIPHENHYDRAMINE HYDROCHLORIDE 25 MG: 50 INJECTION INTRAMUSCULAR; INTRAVENOUS at 04:46

## 2024-07-13 ASSESSMENT — ACTIVITIES OF DAILY LIVING (ADL)
ADLS_ACUITY_SCORE: 43

## 2024-07-13 NOTE — ED TRIAGE NOTES
Pt to ER with c/o bilat flank pain, just started this zulema, pt states that it wraps around to the front

## 2024-07-13 NOTE — ED PROVIDER NOTES
Emergency Department Note      Code Status: Prior    History of Present Illness     Chief Complaint:  Flank Pain     HPI   Nevin Alvarado is a 33 year old female on Eliquis who presents to the ED with bilateral flank pain. The patient went to bed at 1930 yesterday (7/12/24) after taking 2 Tylenol for back pain. The Tylenol provided no relief. She then woke up in the middle of the night with body aches and bilateral flank pain. The patient states that she has been having intermittent body aches for the past couple weeks, but with no fever. Her bilateral flank pain is worse on her L side, which radiates slightly towards the L side of her abdomen. She also has joint pain throughout her body. She has nausea, but denies any vomiting or urinary symptoms. The patient denies any ingestions for the past 2 years. She is currently on Eliquis due to her history of 2 pulmonary embolisms in the past 4 years. She has had no ill contacts.     Independent Historian:    None    Review of External Notes  Reviewed care plan.  One-to-one sitter placed for patient.    Past Medical History   Medical History, Surgical History, Problem List, and Medications  Reviewed in Epic    Physical Exam   Patient Vitals for the past 24 hrs:   BP Temp Temp src Pulse Resp SpO2   07/13/24 0540 131/79 -- -- 107 -- 96 %   07/13/24 0519 -- -- -- -- -- 99 %   07/13/24 0518 137/86 -- -- 93 -- --   07/13/24 0429 131/82 -- -- 102 -- --   07/13/24 0319 -- -- -- -- -- 95 %   07/13/24 0259 -- -- -- -- -- 96 %   07/13/24 0249 -- -- -- -- -- 96 %   07/13/24 0138 -- -- -- -- -- 97 %   07/13/24 0136 (!) 138/98 -- -- (!) 121 -- --   07/13/24 0100 (!) 136/90 -- -- 113 -- 99 %   07/13/24 0051 127/82 -- -- 111 -- 97 %   07/13/24 0050 127/82 -- -- 111 -- 100 %   07/12/24 2352 (!) 148/86 97.4  F (36.3  C) Temporal 118 20 98 %       Physical Exam  Constitutional: Vital signs reviewed as above.   Eyes: PEERL, EOMI B/L  Neck: No JVD noted. FROM   Cardiovascular:  tachycardic rate, Regular rhythm and normal heart sounds.  No murmur heard. Equal B/L peripheral pulses.  Pulmonary/Chest: Effort normal and breath sounds normal. No respiratory distress. Patient has no wheezes. Patient has no rales.   Gastrointestinal: Soft. There is left sided abdominal tenderness > than right  Musculoskeletal/Extremities: There is left flank tenderness to percussion.  Neurological: Alert  Skin: Skin is warm and dry. There is no diaphoresis noted.   Psychiatric: The patient appears calm.     Diagnostics     Laboratory: Imaging:   Labs Ordered and Resulted from Time of ED Arrival to Time of ED Departure   COMPREHENSIVE METABOLIC PANEL - Abnormal       Result Value    Sodium 138      Potassium 4.6      Carbon Dioxide (CO2) 21 (*)     Anion Gap 14      Urea Nitrogen 10.8      Creatinine 0.63      GFR Estimate >90      Calcium 9.3      Chloride 103      Glucose 142 (*)     Alkaline Phosphatase 82      AST 22      ALT 19      Protein Total 7.2      Albumin 4.2      Bilirubin Total 0.3     CBC WITH PLATELETS AND DIFFERENTIAL - Abnormal    WBC Count 15.2 (*)     RBC Count 4.81      Hemoglobin 14.6      Hematocrit 43.3      MCV 90      MCH 30.4      MCHC 33.7      RDW 12.6      Platelet Count 309      % Neutrophils 73      % Lymphocytes 20      % Monocytes 5      % Eosinophils 1      % Basophils 0      % Immature Granulocytes 0      NRBCs per 100 WBC 0      Absolute Neutrophils 11.1 (*)     Absolute Lymphocytes 3.1      Absolute Monocytes 0.8      Absolute Eosinophils 0.2      Absolute Basophils 0.1      Absolute Immature Granulocytes 0.1      Absolute NRBCs 0.0     ROUTINE UA WITH MICROSCOPIC REFLEX TO CULTURE - Normal    Color Urine Straw      Appearance Urine Clear      Glucose Urine Negative      Bilirubin Urine Negative      Ketones Urine Negative      Specific Gravity Urine 1.012      Blood Urine Negative      pH Urine 7.0      Protein Albumin Urine Negative      Urobilinogen Urine Normal       Nitrite Urine Negative      Leukocyte Esterase Urine Negative      RBC Urine 0      WBC Urine <1      Squamous Epithelials Urine <1     LACTIC ACID WHOLE BLOOD - Normal    Lactic Acid 1.3     LIPASE - Normal    Lipase 37     HCG QUALITATIVE PREGNANCY - Normal    hCG Serum Qualitative Negative       CT Abdomen Pelvis w Contrast   Final Result   IMPRESSION:       1.  No acute findings in the abdomen and pelvis.            Independent Interpretation  See ED course    ED Course    Medications Administered  Medications   morphine (PF) injection 4 mg (4 mg Intravenous $Given 7/13/24 0240)   sodium chloride (PF) 0.9% PF flush 100 mL (65 mLs Intravenous $Given 7/13/24 0418)   iopamidol (ISOVUE-370) solution 500 mL (100 mLs Intravenous $Given 7/13/24 0418)   diphenhydrAMINE (BENADRYL) injection 25 mg (25 mg Intravenous $Given 7/13/24 0446)   morphine (PF) injection 4 mg (4 mg Intravenous $Given 7/13/24 0515)       Procedures  Procedures     Discussion of Management  See ED Course    ED Course  ED Course as of 07/13/24 0645   Sat Jul 13, 2024   0220 I obtained the history and examined the patient as above.    0448 Rechecked and updated.       Optional/Additional Documentation: None    Medical Decision Making / Diagnosis     MIPS     None    Medical Decision Making:  This 33-year-old female presents to the ED due to flank pain.  Please see the HPI and exam for specifics.  A broad differential was considered.  I reviewed her care plan, but also took the patient's clinical presentation today into account.  She does have a leukocytosis and this had been increasing from prior lab values.  Due to the focal discomfort, she was given pain medications (she is allergic to oral oxycodone and hydrocodone, and a CT was performed.  Fortunately, urinalysis, labs (other than the leukocytosis) and the CT are reassuring.  I felt the patient was safe to be discharged.  I will recommend repeating her blood work in clinic next week.  She can use  Tylenol but I will also prescribe lidocaine patches for the patient.  She can talk with her primary care clinic about using topical Bengay.  She states that Voltaren gel was not covered by her pharmacy.    The patient does have a care plan.  She states that she has not ingested foreign bodies in about 2 years and actually is in the process of moving out of her group home as she has been doing so well.    She may always return with new or worsening symptoms.    Critical Care:  None.    Disposition:  See ED Course and MDM    ICD-10 Codes:    ICD-10-CM    1. Flank pain  R10.9            Discharge Medications:  Discharge Medication List as of 7/13/2024  5:37 AM        START taking these medications    Details   Lidocaine (LIDOCARE) 4 % Patch Place 1 patch onto the skin every 24 hours for 7 days To prevent lidocaine toxicity, patient should be patch free for 12 hrs daily.Disp-7 patch, [id number]            Scribe Disclosure:  IeCleste, am serving as a scribe at 3:20 AM on 7/13/2024 to document services personally performed by Adam Cannon DO based on my observations and the provider's statements to me.    7/13/2024   Adam Cannon DO     [employer]                    Adam Cannon DO  07/13/24 0619

## 2024-07-14 NOTE — ED NOTES
Pt discharging back to group home. Per MD, pt okay to take Lyft. Vitally stable and states understanding of discharge instructions. IV removed, AVS given and reviewed.

## 2024-07-16 ENCOUNTER — HOSPITAL ENCOUNTER (EMERGENCY)
Facility: CLINIC | Age: 34
Discharge: LEFT BEFORE DISCHARGE | End: 2024-07-16
Admitting: EMERGENCY MEDICINE
Payer: COMMERCIAL

## 2024-07-16 VITALS
DIASTOLIC BLOOD PRESSURE: 102 MMHG | OXYGEN SATURATION: 98 % | BODY MASS INDEX: 45.97 KG/M2 | SYSTOLIC BLOOD PRESSURE: 154 MMHG | RESPIRATION RATE: 20 BRPM | HEIGHT: 62 IN | WEIGHT: 249.78 LBS | TEMPERATURE: 98.1 F | HEART RATE: 106 BPM

## 2024-07-16 PROCEDURE — 99281 EMR DPT VST MAYX REQ PHY/QHP: CPT

## 2024-07-16 NOTE — ED TRIAGE NOTES
"Arrives via EMS for multiple complaints including generalized body pain, headache, dizziness, weakness, confusion, shakiness, nausea, and inability to eat \"for months\". Reports she was at the dentist for a cleaning but was having too much pain to go into the appointment so she called EMS. Alert, tearful, verbally aggressive in triage, swearing and demanding admission to the hospital. Denies thoughts or history of self harm although there is documented proof of such. ABCs intact at this time.      Triage Assessment (Adult)       Row Name 07/16/24 0813          Triage Assessment    Airway WDL WDL        Respiratory WDL    Respiratory WDL WDL        Skin Circulation/Temperature WDL    Skin Circulation/Temperature WDL WDL        Cardiac WDL    Cardiac WDL WDL        Peripheral/Neurovascular WDL    Peripheral Neurovascular WDL WDL        Cognitive/Neuro/Behavioral WDL    Cognitive/Neuro/Behavioral WDL WDL        Eliazar Coma Scale    Best Eye Response 4-->(E4) spontaneous     Best Motor Response 6-->(M6) obeys commands     Best Verbal Response 5-->(V5) oriented     Eliazar Coma Scale Score 15                     "

## 2024-07-25 ENCOUNTER — TELEPHONE (OUTPATIENT)
Dept: ENDOCRINOLOGY | Facility: CLINIC | Age: 34
End: 2024-07-25

## 2024-07-25 ENCOUNTER — TELEPHONE (OUTPATIENT)
Dept: GASTROENTEROLOGY | Facility: CLINIC | Age: 34
End: 2024-07-25

## 2024-07-25 ENCOUNTER — VIRTUAL VISIT (OUTPATIENT)
Dept: ENDOCRINOLOGY | Facility: CLINIC | Age: 34
End: 2024-07-25
Payer: COMMERCIAL

## 2024-07-25 VITALS — BODY MASS INDEX: 44.9 KG/M2 | HEIGHT: 62 IN | WEIGHT: 244 LBS

## 2024-07-25 DIAGNOSIS — E66.813 CLASS 3 SEVERE OBESITY WITH SERIOUS COMORBIDITY AND BODY MASS INDEX (BMI) OF 50.0 TO 59.9 IN ADULT, UNSPECIFIED OBESITY TYPE (H): Primary | ICD-10-CM

## 2024-07-25 DIAGNOSIS — E66.01 CLASS 3 SEVERE OBESITY WITH SERIOUS COMORBIDITY AND BODY MASS INDEX (BMI) OF 50.0 TO 59.9 IN ADULT, UNSPECIFIED OBESITY TYPE (H): Primary | ICD-10-CM

## 2024-07-25 DIAGNOSIS — E11.9 TYPE 2 DIABETES MELLITUS WITHOUT COMPLICATION, WITHOUT LONG-TERM CURRENT USE OF INSULIN (H): ICD-10-CM

## 2024-07-25 PROCEDURE — 99214 OFFICE O/P EST MOD 30 MIN: CPT | Mod: 95 | Performed by: NURSE PRACTITIONER

## 2024-07-25 PROCEDURE — G2211 COMPLEX E/M VISIT ADD ON: HCPCS | Mod: 95 | Performed by: NURSE PRACTITIONER

## 2024-07-25 ASSESSMENT — PAIN SCALES - GENERAL: PAINLEVEL: EXTREME PAIN (9)

## 2024-07-25 NOTE — PATIENT INSTRUCTIONS
Reduce ozempic to 1mg   Increase miralax 2 capfuls daily   Continue metamucil daily   Increase fluids- keep track of this   Have them resend psych eval   Follow up 3 months   ________________________________________________  Clinic Fax: [phone]  __________________________________________________________________________

## 2024-07-25 NOTE — LETTER
2024       RE: Nevin Alvarado  [address]  [facility] [place] [address]     Dear Colleague,    Thank you for referring your patient, Nevin Alvarado, to the [facility] [place] [facility]. Please see a copy of my visit note below.      Return Medical Weight Management Note     Nevin Alvarado  MRN:  [id number]  :  1990  MOR:  2024    Dear Latonya Knight MD,    I had the pleasure of seeing your patient Nevin Alvarado. She is a 33 year old female who I am continuing to see for treatment of obesity related to:        2023    12:48 PM   --   I have the following health issues associated with obesity Type II Diabetes    High Blood Pressure    Sleep Apnea    Polycystic Ovarian Syndrome    GERD (Reflux)    Fatty Liver    Asthma    Stress Incontinence       Assessment & Plan  Problem List Items Addressed This Visit          Digestive    Class 3 severe obesity with serious comorbidity and body mass index (BMI) of 50.0 to 59.9 in adult, unspecified obesity type (H) - Primary     Since last seen 6 weeks ago (increased ozempic to 2mg at that time) she is experiencing disinterest in food, decreased drive to drink, land lacking motivation to eat. She has been overall feeling unwell but she doesn't feel this is all related to ozempic. Symptoms worse over time. Needs to reduce dose to 1mg. She has 1mg available at home. She needs to push fluids and start protein shakes. Agrees with this plan.     Worsening constipation. Previously refused to take miralax because she hasn't found beneficial. Discussed increasing dose of miralax and adding to metamucil she is current taking. Hopefully will improve with ozempic reduction.     Reduce ozempic to 1mg   Increase miralax 2 capfuls daily   Continue metamucil daily   Increase fluids- keep track of this   Have them resend psych eval   Follow up 3  months          Relevant Medications    insulin pen needle (31G X 8 MM) 31G X 8 MM miscellaneous    Other Relevant Orders    Adult Bariatrics and Weight Management Clinic Follow-Up Order       Endocrine    Type 2 diabetes mellitus without complication, without long-term current use of insulin (H)    Relevant Medications    insulin pen needle (31G X 8 MM) 31G X 8 MM miscellaneous    Other Relevant Orders    Adult Bariatrics and Weight Management Clinic Follow-Up Order          INTERVAL HISTORY:    NBS 9/2023 BMI 56.5 with DMII, ZOHRA, GERD, and hx of PE. Mental health struggles contributing to weight gain including weight gain associated with medications. This was complicated by numerous episodes of swallowing non food items which has lead to perforations of the esophagus and strictures for which she is followed by GI. Had been stable for several months prior to a relapse just before our consult. We discussed mwm to start given increased risks after bariatric surgery to the stomach. Initially switched rybelsus for DMII to wegovy for better efficacy. Due to insurance change she is now taking ozempic.     Last seen 6/2024- increased ozempic to 2mg  Since lasts seen- worsening full body pain, worsening headaches (moving location, described by drilling pressure causing nausea and chest pain)    Constipation - reduced frequency, having urge to go but not able to go- just pass gas     Not really wanting to move forward with surgery at this point. Has met some people who had surgery and is concerned about complications.      Anti-obesity medication history    Current:   Ozempic 2mg - no adverse side effects   - not seeing any weight loss     Past/Failed/contraindicated:   Failed metformin, rybelsus     Recent diet changes:   Really only 1 meal a day- sometimes only an uncrustable  Consistently eating breakfast   Trying to meal prep but not always eating them   Doing some carnation breakfast or ensure protein max if she  remembers    drive to drink- not drinking as much as she used to - maybe getting 32 oz daily     Recent exercise/activity changes: low energy     Recent stressors:   with feeling so sick she has been limited in motivation to prepare food and/or eat   Lots of appointments and commitments at [employer]  Working now     Vitamins/Labs: cmp 2024     CURRENT WEIGHT:   244 lbs 0 oz    Initial Weight (lbs): 309 lbs  Last Visits Weight: 111.6 kg (246 lb)  Cumulative weight loss (lbs): 65  Weight Loss Percentage: 21.04%        2024     1:00 PM   Changes and Difficulties   I have made the following changes to my diet since my last visit: been sick, so no appetite   I have made the following changes to my activity/exercise since my last visit: none         MEDICATIONS:   Current Outpatient Medications   Medication Sig Dispense Refill    insulin pen needle (31G X 8 MM) 31G X 8 MM miscellaneous Use 1 pen needles daily or as directed. 100 each 1    acetaminophen (TYLENOL) 500 MG tablet Take 2 tablets (1,000 mg) by mouth every 6 hours as needed for pain or fever 60 tablet 0    albuterol (PROAIR HFA/PROVENTIL HFA/VENTOLIN HFA) 108 (90 Base) MCG/ACT inhaler Inhale 2 puffs into the lungs every 6 hours as needed for shortness of breath / dyspnea or wheezing 18 g 0    albuterol (PROVENTIL) (2.5 MG/3ML) 0.083% neb solution Take 1 vial (2.5 mg) by nebulization every 6 hours as needed for shortness of breath or wheezing 90 mL 0    Apixaban Starter Pack (ELIQUIS DVT/PE STARTER PACK) 5 MG TBPK Take 2 tablets (10 mg) by mouth twice daily for 7 days then take 1 tablet (5 mg) twice daily thereafter      benzonatate (TESSALON) 100 MG capsule Take 1 capsule (100 mg) by mouth 3 times daily as needed for cough 12 capsule 0    cetirizine (ZYRTEC) 10 MG tablet Take 1 tablet (10 mg) by mouth daily 30 tablet 0    Cholecalciferol (D3 HIGH POTENCY) 25 MCG (1000 UT) CAPS Take 50 mcg by mouth daily      clonazePAM (KLONOPIN) 0.5 MG tablet Take 1  tablet (0.5 mg) by mouth daily as needed for anxiety 7 tablet 0    cyclobenzaprine (FLEXERIL) 10 MG tablet Take 1 tablet (10 mg) by mouth 3 times daily as needed for muscle spasms 20 tablet 0    dicyclomine (BENTYL) 10 MG capsule Take 1 capsule (10 mg) by mouth daily as needed (Abdominal Cramping) 10 capsule 1    escitalopram (LEXAPRO) 10 MG tablet Take 10 mg by mouth      ferrous sulfate (FEROSUL) 325 (65 Fe) MG tablet Take 1 tablet (325 mg) by mouth daily (with breakfast) 30 tablet 0    hydrOXYzine HCl (ATARAX) 25 MG tablet Take 25 mg by mouth      montelukast (SINGULAIR) 10 MG tablet Take 10 mg by mouth every evening      norethindrone (AYGESTIN) 5 MG tablet Take 5 mg by mouth daily      ondansetron (ZOFRAN-ODT) 4 MG ODT tab Take 1 tablet (4 mg) by mouth every 8 hours as needed for nausea 15 tablet 0    pantoprazole (PROTONIX) 40 MG EC tablet Take 40 mg by mouth daily      polyethylene glycol (MIRALAX) 17 GM/Dose powder Take 17 g by mouth daily as needed for constipation      PSYLLIUM PO Take 1 tsp by mouth daily      Respiratory Therapy Supplies (NEBULIZER) BRENDAN Nebulizer device.  Albuterol nebulization every 2 hours as needed for shortness of breath or wheezing. 1 each 0    Semaglutide, 2 MG/DOSE, (OZEMPIC) 8 MG/3ML pen Inject 2 mg Subcutaneous every 7 days 9 mL 1    valACYclovir (VALTREX) 1000 mg tablet Take 2,000 mg by mouth 2 times daily as needed Take 2 tablets by mouth two times daily for one day. Use as needed at onset of cold sore.             1/5/2024     1:00 PM   Weight Loss Medication History Reviewed With Patient   Which weight loss medications are you currently taking on a regular basis? Wegovy   Are you having any side effects from the weight loss medication that we have prescribed you? No       Admission on 07/13/2024, Discharged on 07/13/2024   Component Date Value Ref Range Status    Color Urine 07/13/2024 Straw  Colorless, Straw, Light Yellow, Yellow Final    Appearance Urine 07/13/2024 Clear   Clear Final    Glucose Urine 07/13/2024 Negative  Negative mg/dL Final    Bilirubin Urine 07/13/2024 Negative  Negative Final    Ketones Urine 07/13/2024 Negative  Negative mg/dL Final    Specific Gravity Urine 07/13/2024 1.012  1.003 - 1.035 Final    Blood Urine 07/13/2024 Negative  Negative Final    pH Urine 07/13/2024 7.0  5.0 - 7.0 Final    Protein Albumin Urine 07/13/2024 Negative  Negative mg/dL Final    Urobilinogen Urine 07/13/2024 Normal  Normal, 2.0 mg/dL Final    Nitrite Urine 07/13/2024 Negative  Negative Final    Leukocyte Esterase Urine 07/13/2024 Negative  Negative Final    RBC Urine 07/13/2024 0  <=2 /HPF Final    WBC Urine 07/13/2024 <1  <=5 /HPF Final    Squamous Epithelials Urine 07/13/2024 <1  <=1 /HPF Final    Sodium 07/12/2024 138  135 - 145 mmol/L Final    Potassium 07/12/2024 4.6  3.4 - 5.3 mmol/L Final    Specimen slightly hemolyzed. The reported potassium value may be falsely elevated. Analysis of a non-hemolyzed specimen (i.e. re-draw) may result in a lower potassium value.    Carbon Dioxide (CO2) 07/12/2024 21 (L)  22 - 29 mmol/L Final    Anion Gap 07/12/2024 14  7 - 15 mmol/L Final    Urea Nitrogen 07/12/2024 10.8  6.0 - 20.0 mg/dL Final    Creatinine 07/12/2024 0.63  0.51 - 0.95 mg/dL Final    GFR Estimate 07/12/2024 >90  >60 mL/min/1.73m2 Final    eGFR calculated using 2021 CKD-EPI equation.    Calcium 07/12/2024 9.3  8.6 - 10.0 mg/dL Final    Chloride 07/12/2024 103  98 - 107 mmol/L Final    Glucose 07/12/2024 142 (H)  70 - 99 mg/dL Final    Alkaline Phosphatase 07/12/2024 82  40 - 150 U/L Final    AST 07/12/2024 22  0 - 45 U/L Final    Specimen is hemolyzed which can falsely elevate AST. Analysis of a non-hemolyzed specimen may result in a lower value.  Reference intervals for this test were updated on 6/12/2023 to more accurately reflect our healthy population. There may be differences in the flagging of prior results with similar values performed with this method. Interpretation of  those prior results can be made in the context of the updated reference intervals.    ALT 07/12/2024 19  0 - 50 U/L Final    Reference intervals for this test were updated on 6/12/2023 to more accurately reflect our healthy population. There may be differences in the flagging of prior results with similar values performed with this method. Interpretation of those prior results can be made in the context of the updated reference intervals.      Protein Total 07/12/2024 7.2  6.4 - 8.3 g/dL Final    Albumin 07/12/2024 4.2  3.5 - 5.2 g/dL Final    Bilirubin Total 07/12/2024 0.3  <=1.2 mg/dL Final    WBC Count 07/12/2024 15.2 (H)  4.0 - 11.0 10e3/uL Final    RBC Count 07/12/2024 4.81  3.80 - 5.20 10e6/uL Final    Hemoglobin 07/12/2024 14.6  11.7 - 15.7 g/dL Final    Hematocrit 07/12/2024 43.3  35.0 - 47.0 % Final    MCV 07/12/2024 90  78 - 100 fL Final    MCH 07/12/2024 30.4  26.5 - 33.0 pg Final    MCHC 07/12/2024 33.7  31.5 - 36.5 g/dL Final    RDW 07/12/2024 12.6  10.0 - 15.0 % Final    Platelet Count 07/12/2024 309  150 - 450 10e3/uL Final    % Neutrophils 07/12/2024 73  % Final    % Lymphocytes 07/12/2024 20  % Final    % Monocytes 07/12/2024 5  % Final    % Eosinophils 07/12/2024 1  % Final    % Basophils 07/12/2024 0  % Final    % Immature Granulocytes 07/12/2024 0  % Final    NRBCs per 100 WBC 07/12/2024 0  <1 /100 Final    Absolute Neutrophils 07/12/2024 11.1 (H)  1.6 - 8.3 10e3/uL Final    Absolute Lymphocytes 07/12/2024 3.1  0.8 - 5.3 10e3/uL Final    Absolute Monocytes 07/12/2024 0.8  0.0 - 1.3 10e3/uL Final    Absolute Eosinophils 07/12/2024 0.2  0.0 - 0.7 10e3/uL Final    Absolute Basophils 07/12/2024 0.1  0.0 - 0.2 10e3/uL Final    Absolute Immature Granulocytes 07/12/2024 0.1  <=0.4 10e3/uL Final    Absolute NRBCs 07/12/2024 0.0  10e3/uL Final    Hold Specimen 07/12/2024 JI   Final    Hold Specimen 07/12/2024 [facility]   Final    Lactic Acid 07/12/2024 1.3  0.7 - 2.0 mmol/L Final    Lipase 07/13/2024 37  13 -  "60 U/L Final    hCG Serum Qualitative 07/12/2024 Negative  Negative Final    This test is for screening purposes.  Results should be interpreted along with the clinical picture.  Confirmation testing is available if warranted by ordering [id number], HCG Quantitative Pregnancy.           9/13/2023    10:26 AM   BRIAN Score (Last Two)   BRIAN Raw Score 37   Activation Score 79.2   BRIAN Level 4         PHYSICAL EXAM:  Objective   Ht 1.575 m (5' 2\")   Wt 110.7 kg (244 lb)   LMP 04/29/2024 (Approximate)   BMI 44.63 kg/m      Vitals - Patient Reported  Pain Score: Extreme Pain (9)  Pain Loc:  (all over)        GENERAL: alert and no distress  EYES: Eyes grossly normal to inspection.  No discharge or erythema, or obvious scleral/conjunctival abnormalities.  RESP: No audible wheeze, cough, or visible cyanosis.    SKIN: Visible skin clear. No significant rash, abnormal pigmentation or lesions.  NEURO: Cranial nerves grossly intact.  Mentation and speech appropriate for age.  PSYCH: Appropriate affect, tone, and pace of words        Sincerely,    Sharon Toro NP      30 minutes spent by me on the date of the encounter doing chart review, history and exam, documentation and further activities per the note    Virtual Visit Details    Type of service:  Video Visit   Video Start Time:  0923  Video End Time: 0949    Originating Location (pt. Location): Home    The longitudinal plan of care for the diagnosis(es)/condition(s) as documented were addressed during this visit. Due to the added complexity in care, I will continue to support Nevin in the subsequent management and with ongoing continuity of care.  Distant Location (provider location):  Off-site  Platform used for Video Visit: Thuy  "

## 2024-07-25 NOTE — TELEPHONE ENCOUNTER
General Call    Contacts       Contact Date/Time Type Contact Phone/Fax    07/25/2024 09:18 AM CDT Phone (Incoming) Nevin Alvarado (Self) [phone] (M)          Reason for Call:  appt today    What are your questions or concerns:  pt is very upset her appt has not started yet       Could we send this information to you in [employer] or would you prefer to receive a phone call?:   Patient would prefer a phone call   Okay to leave a detailed message?: Yes at Cell number on file:    Telephone Information:   Mobile [phone]

## 2024-07-25 NOTE — NURSING NOTE
Current patient location: home     Is the patient currently in the state of [place]? YES    Visit mode:VIDEO    If the visit is dropped, the patient can be reconnected by: VIDEO VISIT: Text to cell phone:   Telephone Information:   Mobile [phone]       Will anyone else be joining the visit? NO  (If patient encounters technical issues they should call [phone])    How would you like to obtain your AVS? MyChart    Are changes needed to the allergy or medication list? Yes Amitriptyline just prescribed yesterday.     Are refills needed on medications prescribed by this physician? NO    Reason for visit: RECHECK    Wt other than 24 hrs:    Pain more than one location: all over 9   Kailyn ACOSTAF

## 2024-07-25 NOTE — TELEPHONE ENCOUNTER
[facility] Call Center    Phone Message    May a detailed message be left on voicemail: yes     Reason for Call: Other: Nevin is calling in asking for a call back. Pt states that she was told at her last appt on 5/8 that her upcoming procedure would be getting canceled, and she is looking to speak with her care team about this. Please call back to discuss.     Action Taken: Message routed to:  [facility] ([facility] GI    Travel Screening: Not Applicable     Date of Service:

## 2024-07-25 NOTE — PROGRESS NOTES
Return Medical Weight Management Note     Nevin Alvarado  MRN:  [id number]  :  1990  MOR:  2024    Dear Latonya Knight MD,    I had the pleasure of seeing your patient Nevin Alvarado. She is a 33 year old female who I am continuing to see for treatment of obesity related to:        2023    12:48 PM   --   I have the following health issues associated with obesity Type II Diabetes    High Blood Pressure    Sleep Apnea    Polycystic Ovarian Syndrome    GERD (Reflux)    Fatty Liver    Asthma    Stress Incontinence       Assessment & Plan   Problem List Items Addressed This Visit          Digestive    Class 3 severe obesity with serious comorbidity and body mass index (BMI) of 50.0 to 59.9 in adult, unspecified obesity type (H) - Primary     Since last seen 6 weeks ago (increased ozempic to 2mg at that time) she is experiencing disinterest in food, decreased drive to drink, land lacking motivation to eat. She has been overall feeling unwell but she doesn't feel this is all related to ozempic. Symptoms worse over time. Needs to reduce dose to 1mg. She has 1mg available at home. She needs to push fluids and start protein shakes. Agrees with this plan.     Worsening constipation. Previously refused to take miralax because she hasn't found beneficial. Discussed increasing dose of miralax and adding to metamucil she is current taking. Hopefully will improve with ozempic reduction.     Reduce ozempic to 1mg   Increase miralax 2 capfuls daily   Continue metamucil daily   Increase fluids- keep track of this   Have them resend psych eval   Follow up 3 months          Relevant Medications    insulin pen needle (31G X 8 MM) 31G X 8 MM miscellaneous    Other Relevant Orders    [facility]ult [facility] Follow-Up Order       Endocrine    Type 2 diabetes mellitus without complication, without long-term current use of insulin (H)    Relevant Medications    insulin pen needle  (31G X 8 MM) 31G X 8 MM miscellaneous    Other Relevant Orders    Adult [facility] Follow-Up Order          INTERVAL HISTORY:    NBS 2023 BMI 56.5 with DMII, ZOHRA, GERD, and hx of PE. Mental health struggles contributing to weight gain including weight gain associated with medications. This was complicated by numerous episodes of swallowing non food items which has lead to perforations of the esophagus and strictures for which she is followed by GI. Had been stable for several months prior to a relapse just before our consult. We discussed mwm to start given increased risks after bariatric surgery to the stomach. Initially switched rybelsus for DMII to wegovy for better efficacy. Due to insurance change she is now taking ozempic.     Last seen 2024- increased ozempic to 2mg  Since lasts seen- worsening full body pain, worsening headaches (moving location, described by drilling pressure causing nausea and chest pain)    Constipation - reduced frequency, having urge to go but not able to go- just pass gas     Not really wanting to move forward with surgery at this point. Has met some people who had surgery and is concerned about complications.      Anti-obesity medication history    Current:   Ozempic 2mg - no adverse side effects   - not seeing any weight loss     Past/Failed/contraindicated:   Failed metformin, rybelsus     Recent diet changes:   Really only 1 meal a day- sometimes only an uncrustable  Consistently eating breakfast   Trying to meal prep but not always eating them   Doing some carnation breakfast or ensure protein max if she remembers    drive to drink- not drinking as much as she used to - maybe getting 32 oz daily     Recent exercise/activity changes: low energy     Recent stressors:   with feeling so sick she has been limited in motivation to prepare food and/or eat   Lots of appointments and commitments at [employer]  Working now     Vitamins/Labs: cmp 2024      CURRENT WEIGHT:   244 lbs 0 oz    Initial Weight (lbs): 309 lbs  Last Visits Weight: 111.6 kg (246 lb)  Cumulative weight loss (lbs): 65  Weight Loss Percentage: 21.04%        1/5/2024     1:00 PM   Changes and Difficulties   I have made the following changes to my diet since my last visit: been sick, so no appetite   I have made the following changes to my activity/exercise since my last visit: none         MEDICATIONS:   Current Outpatient Medications   Medication Sig Dispense Refill    insulin pen needle (31G X 8 MM) 31G X 8 MM miscellaneous Use 1 pen needles daily or as directed. 100 each 1    acetaminophen (TYLENOL) 500 MG tablet Take 2 tablets (1,000 mg) by mouth every 6 hours as needed for pain or fever 60 tablet 0    albuterol (PROAIR HFA/PROVENTIL HFA/VENTOLIN HFA) 108 (90 Base) MCG/ACT inhaler Inhale 2 puffs into the lungs every 6 hours as needed for shortness of breath / dyspnea or wheezing 18 g 0    albuterol (PROVENTIL) (2.5 MG/3ML) 0.083% neb solution Take 1 vial (2.5 mg) by nebulization every 6 hours as needed for shortness of breath or wheezing 90 mL 0    Apixaban Starter Pack (ELIQUIS DVT/PE STARTER PACK) 5 MG TBPK Take 2 tablets (10 mg) by mouth twice daily for 7 days then take 1 tablet (5 mg) twice daily thereafter      benzonatate (TESSALON) 100 MG capsule Take 1 capsule (100 mg) by mouth 3 times daily as needed for cough 12 capsule 0    cetirizine (ZYRTEC) 10 MG tablet Take 1 tablet (10 mg) by mouth daily 30 tablet 0    Cholecalciferol (D3 HIGH POTENCY) 25 MCG (1000 UT) CAPS Take 50 mcg by mouth daily      clonazePAM (KLONOPIN) 0.5 MG tablet Take 1 tablet (0.5 mg) by mouth daily as needed for anxiety 7 tablet 0    cyclobenzaprine (FLEXERIL) 10 MG tablet Take 1 tablet (10 mg) by mouth 3 times daily as needed for muscle spasms 20 tablet 0    dicyclomine (BENTYL) 10 MG capsule Take 1 capsule (10 mg) by mouth daily as needed (Abdominal Cramping) 10 capsule 1    escitalopram (LEXAPRO) 10 MG  tablet Take 10 mg by mouth      ferrous sulfate (FEROSUL) 325 (65 Fe) MG tablet Take 1 tablet (325 mg) by mouth daily (with breakfast) 30 tablet 0    hydrOXYzine HCl (ATARAX) 25 MG tablet Take 25 mg by mouth      montelukast (SINGULAIR) 10 MG tablet Take 10 mg by mouth every evening      norethindrone (AYGESTIN) 5 MG tablet Take 5 mg by mouth daily      ondansetron (ZOFRAN-ODT) 4 MG ODT tab Take 1 tablet (4 mg) by mouth every 8 hours as needed for nausea 15 tablet 0    pantoprazole (PROTONIX) 40 MG EC tablet Take 40 mg by mouth daily      polyethylene glycol (MIRALAX) 17 GM/Dose powder Take 17 g by mouth daily as needed for constipation      PSYLLIUM PO Take 1 tsp by mouth daily      Respiratory Therapy Supplies (NEBULIZER) BRENDAN Nebulizer device.  Albuterol nebulization every 2 hours as needed for shortness of breath or wheezing. 1 each 0    Semaglutide, 2 MG/DOSE, (OZEMPIC) 8 MG/3ML pen Inject 2 mg Subcutaneous every 7 days 9 mL 1    valACYclovir (VALTREX) 1000 mg tablet Take 2,000 mg by mouth 2 times daily as needed Take 2 tablets by mouth two times daily for one day. Use as needed at onset of cold sore.             1/5/2024     1:00 PM   Weight Loss Medication History Reviewed With Patient   Which weight loss medications are you currently taking on a regular basis? Wegovy   Are you having any side effects from the weight loss medication that we have prescribed you? No       Admission on 07/13/2024, Discharged on 07/13/2024   Component Date Value Ref Range Status    Color Urine 07/13/2024 Straw  Colorless, Straw, Light Yellow, Yellow Final    Appearance Urine 07/13/2024 Clear  Clear Final    Glucose Urine 07/13/2024 Negative  Negative mg/dL Final    Bilirubin Urine 07/13/2024 Negative  Negative Final    Ketones Urine 07/13/2024 Negative  Negative mg/dL Final    Specific Gravity Urine 07/13/2024 1.012  1.003 - 1.035 Final    Blood Urine 07/13/2024 Negative  Negative Final    pH Urine 07/13/2024 7.0  5.0 - 7.0 Final     Protein Albumin Urine 07/13/2024 Negative  Negative mg/dL Final    Urobilinogen Urine 07/13/2024 Normal  Normal, 2.0 mg/dL Final    Nitrite Urine 07/13/2024 Negative  Negative Final    Leukocyte Esterase Urine 07/13/2024 Negative  Negative Final    RBC Urine 07/13/2024 0  <=2 /HPF Final    WBC Urine 07/13/2024 <1  <=5 /HPF Final    Squamous Epithelials Urine 07/13/2024 <1  <=1 /HPF Final    Sodium 07/12/2024 138  135 - 145 mmol/L Final    Potassium 07/12/2024 4.6  3.4 - 5.3 mmol/L Final    Specimen slightly hemolyzed. The reported potassium value may be falsely elevated. Analysis of a non-hemolyzed specimen (i.e. re-draw) may result in a lower potassium value.    Carbon Dioxide (CO2) 07/12/2024 21 (L)  22 - 29 mmol/L Final    Anion Gap 07/12/2024 14  7 - 15 mmol/L Final    Urea Nitrogen 07/12/2024 10.8  6.0 - 20.0 mg/dL Final    Creatinine 07/12/2024 0.63  0.51 - 0.95 mg/dL Final    GFR Estimate 07/12/2024 >90  >60 mL/min/1.73m2 Final    eGFR calculated using 2021 CKD-EPI equation.    Calcium 07/12/2024 9.3  8.6 - 10.0 mg/dL Final    Chloride 07/12/2024 103  98 - 107 mmol/L Final    Glucose 07/12/2024 142 (H)  70 - 99 mg/dL Final    Alkaline Phosphatase 07/12/2024 82  40 - 150 U/L Final    AST 07/12/2024 22  0 - 45 U/L Final    Specimen is hemolyzed which can falsely elevate AST. Analysis of a non-hemolyzed specimen may result in a lower value.  Reference intervals for this test were updated on 6/12/2023 to more accurately reflect our healthy population. There may be differences in the flagging of prior results with similar values performed with this method. Interpretation of those prior results can be made in the context of the updated reference intervals.    ALT 07/12/2024 19  0 - 50 U/L Final    Reference intervals for this test were updated on 6/12/2023 to more accurately reflect our healthy population. There may be differences in the flagging of prior results with similar values performed with this method.  Interpretation of those prior results can be made in the context of the updated reference intervals.      Protein Total 07/12/2024 7.2  6.4 - 8.3 g/dL Final    Albumin 07/12/2024 4.2  3.5 - 5.2 g/dL Final    Bilirubin Total 07/12/2024 0.3  <=1.2 mg/dL Final    WBC Count 07/12/2024 15.2 (H)  4.0 - 11.0 10e3/uL Final    RBC Count 07/12/2024 4.81  3.80 - 5.20 10e6/uL Final    Hemoglobin 07/12/2024 14.6  11.7 - 15.7 g/dL Final    Hematocrit 07/12/2024 43.3  35.0 - 47.0 % Final    MCV 07/12/2024 90  78 - 100 fL Final    MCH 07/12/2024 30.4  26.5 - 33.0 pg Final    MCHC 07/12/2024 33.7  31.5 - 36.5 g/dL Final    RDW 07/12/2024 12.6  10.0 - 15.0 % Final    Platelet Count 07/12/2024 309  150 - 450 10e3/uL Final    % Neutrophils 07/12/2024 73  % Final    % Lymphocytes 07/12/2024 20  % Final    % Monocytes 07/12/2024 5  % Final    % Eosinophils 07/12/2024 1  % Final    % Basophils 07/12/2024 0  % Final    % Immature Granulocytes 07/12/2024 0  % Final    NRBCs per 100 WBC 07/12/2024 0  <1 /100 Final    Absolute Neutrophils 07/12/2024 11.1 (H)  1.6 - 8.3 10e3/uL Final    Absolute Lymphocytes 07/12/2024 3.1  0.8 - 5.3 10e3/uL Final    Absolute Monocytes 07/12/2024 0.8  0.0 - 1.3 10e3/uL Final    Absolute Eosinophils 07/12/2024 0.2  0.0 - 0.7 10e3/uL Final    Absolute Basophils 07/12/2024 0.1  0.0 - 0.2 10e3/uL Final    Absolute Immature Granulocytes 07/12/2024 0.1  <=0.4 10e3/uL Final    Absolute NRBCs 07/12/2024 0.0  10e3/uL Final    Hold Specimen 07/12/2024 JIC   Final    Hold Specimen 07/12/2024 JIC   Final    Lactic Acid 07/12/2024 1.3  0.7 - 2.0 mmol/L Final    Lipase 07/13/2024 37  13 - 60 U/L Final    hCG Serum Qualitative 07/12/2024 Negative  Negative Final    This test is for screening purposes.  Results should be interpreted along with the clinical picture.  Confirmation testing is available if warranted by ordering [id number], HCG Quantitative Pregnancy.           9/13/2023    10:26 AM   BRIAN Score (Last Two)   BRIAN Raw  "Score 37   Activation Score 79.2   BRIAN Level 4         PHYSICAL EXAM:  Objective    Ht 1.575 m (5' 2\")   Wt 110.7 kg (244 lb)   LMP 04/29/2024 (Approximate)   BMI 44.63 kg/m      Vitals - Patient Reported  Pain Score: Extreme Pain (9)  Pain Loc:  (all over)        GENERAL: alert and no distress  EYES: Eyes grossly normal to inspection.  No discharge or erythema, or obvious scleral/conjunctival abnormalities.  RESP: No audible wheeze, cough, or visible cyanosis.    SKIN: Visible skin clear. No significant rash, abnormal pigmentation or lesions.  NEURO: Cranial nerves grossly intact.  Mentation and speech appropriate for age.  PSYCH: Appropriate affect, tone, and pace of words        Sincerely,    Sharon Toro NP      30 minutes spent by me on the date of the encounter doing chart review, history and exam, documentation and further activities per the note    Virtual Visit Details    Type of service:  Video Visit   Video Start Time:  0923  Video End Time: 0949    Originating Location (pt. Location): Home    The longitudinal plan of care for the diagnosis(es)/condition(s) as documented were addressed during this visit. Due to the added complexity in care, I will continue to support Nevin in the subsequent management and with ongoing continuity of care.  Distant Location (provider location):  Off-site  Platform used for Video Visit: Thuy"

## 2024-07-26 NOTE — ASSESSMENT & PLAN NOTE
Since last seen 6 weeks ago (increased ozempic to 2mg at that time) she is experiencing disinterest in food, decreased drive to drink, land lacking motivation to eat. She has been overall feeling unwell but she doesn't feel this is all related to ozempic. Symptoms worse over time. Needs to reduce dose to 1mg. She has 1mg available at home. She needs to push fluids and start protein shakes. Agrees with this plan.     Worsening constipation. Previously refused to take miralax because she hasn't found beneficial. Discussed increasing dose of miralax and adding to metamucil she is current taking. Hopefully will improve with ozempic reduction.     Reduce ozempic to 1mg   Increase miralax 2 capfuls daily   Continue metamucil daily   Increase fluids- keep track of this   Have them resend psych eval   Follow up 3 months

## 2024-08-02 ENCOUNTER — HOSPITAL ENCOUNTER (EMERGENCY)
Facility: CLINIC | Age: 34
Discharge: HOME | End: 2024-08-02
Admitting: PHYSICIAN ASSISTANT
Payer: COMMERCIAL

## 2024-08-02 VITALS
RESPIRATION RATE: 18 BRPM | SYSTOLIC BLOOD PRESSURE: 138 MMHG | OXYGEN SATURATION: 98 % | HEART RATE: 95 BPM | WEIGHT: 245 LBS | DIASTOLIC BLOOD PRESSURE: 83 MMHG | BODY MASS INDEX: 45.08 KG/M2 | TEMPERATURE: 97.6 F | HEIGHT: 62 IN

## 2024-08-02 DIAGNOSIS — R11.2 NAUSEA VOMITING AND DIARRHEA: ICD-10-CM

## 2024-08-02 DIAGNOSIS — R10.10 PAIN OF UPPER ABDOMEN: ICD-10-CM

## 2024-08-02 DIAGNOSIS — R19.7 NAUSEA VOMITING AND DIARRHEA: ICD-10-CM

## 2024-08-02 LAB
ALBUMIN UR-MCNC: NEGATIVE MG/DL
APPEARANCE UR: CLEAR
ATRIAL RATE - MUSE: 87 BPM
BILIRUB UR QL STRIP: NEGATIVE
COLOR UR AUTO: COLORLESS
DIASTOLIC BLOOD PRESSURE - MUSE: NORMAL MMHG
GLUCOSE UR STRIP-MCNC: NEGATIVE MG/DL
HCG UR QL: NEGATIVE
HGB UR QL STRIP: NEGATIVE
INTERPRETATION ECG - MUSE: NORMAL
KETONES UR STRIP-MCNC: NEGATIVE MG/DL
LEUKOCYTE ESTERASE UR QL STRIP: NEGATIVE
NITRATE UR QL: NEGATIVE
P AXIS - MUSE: 36 DEGREES
PH UR STRIP: 7 [PH] (ref 5–7)
PR INTERVAL - MUSE: 146 MS
QRS DURATION - MUSE: 76 MS
QT - MUSE: 350 MS
QTC - MUSE: 421 MS
R AXIS - MUSE: 84 DEGREES
RBC URINE: 0 /HPF
SP GR UR STRIP: 1.01 (ref 1–1.03)
SYSTOLIC BLOOD PRESSURE - MUSE: NORMAL MMHG
T AXIS - MUSE: -7 DEGREES
UROBILINOGEN UR STRIP-MCNC: <2 MG/DL
VENTRICULAR RATE- MUSE: 87 BPM
WBC URINE: <1 /HPF

## 2024-08-02 PROCEDURE — 93005 ELECTROCARDIOGRAM TRACING: CPT | Performed by: EMERGENCY MEDICINE

## 2024-08-02 PROCEDURE — 99284 EMERGENCY DEPT VISIT MOD MDM: CPT

## 2024-08-02 PROCEDURE — 81025 URINE PREGNANCY TEST: CPT | Performed by: PHYSICIAN ASSISTANT

## 2024-08-02 PROCEDURE — 81003 URINALYSIS AUTO W/O SCOPE: CPT | Performed by: EMERGENCY MEDICINE

## 2024-08-02 RX ORDER — ONDANSETRON 2 MG/ML
4 INJECTION INTRAMUSCULAR; INTRAVENOUS ONCE
Status: DISCONTINUED | OUTPATIENT
Start: 2024-08-02 | End: 2024-08-02 | Stop reason: HOSPADM

## 2024-08-02 RX ORDER — KETOROLAC TROMETHAMINE 15 MG/ML
15 INJECTION, SOLUTION INTRAMUSCULAR; INTRAVENOUS ONCE
Status: DISCONTINUED | OUTPATIENT
Start: 2024-08-02 | End: 2024-08-02 | Stop reason: HOSPADM

## 2024-08-02 RX ORDER — ACETAMINOPHEN 500 MG
1000 TABLET ORAL EVERY 6 HOURS PRN
Qty: 60 TABLET | Refills: 0 | Status: SHIPPED | OUTPATIENT
Start: 2024-08-02

## 2024-08-02 RX ORDER — ONDANSETRON 4 MG/1
4 TABLET, ORALLY DISINTEGRATING ORAL EVERY 8 HOURS PRN
Qty: 15 TABLET | Refills: 0 | Status: SHIPPED | OUTPATIENT
Start: 2024-08-02

## 2024-08-02 RX ORDER — MAGNESIUM HYDROXIDE/ALUMINUM HYDROXICE/SIMETHICONE 120; 1200; 1200 MG/30ML; MG/30ML; MG/30ML
15 SUSPENSION ORAL ONCE
Status: DISCONTINUED | OUTPATIENT
Start: 2024-08-02 | End: 2024-08-02 | Stop reason: HOSPADM

## 2024-08-02 NOTE — ED TRIAGE NOTES
Arrives to ED via [facility] from group home with c/o abd pain, N/V/D. Reports accidentally took morning medications last night instead of this morning. Took extra vitamins and birth control last night. Went to court at 0900 today. Afterwards pt had [employer] for lunch. Reports emesis x2 and diarrhea x7 beginning at 1100. Reports upper chest pain as well. Pt took 4mg of ODT zofran at home. EMS gave 4mg ODT zofran en route.      Triage Assessment (Adult)       Row Name 08/02/24 1411          Triage Assessment    Airway WDL WDL        Respiratory WDL    Respiratory WDL WDL        Skin Circulation/Temperature WDL    Skin Circulation/Temperature WDL WDL        Cardiac WDL    Cardiac WDL X;chest pain        Peripheral/Neurovascular WDL    Peripheral Neurovascular WDL WDL        Cognitive/Neuro/Behavioral WDL    Cognitive/Neuro/Behavioral WDL WDL

## 2024-08-02 NOTE — ED PROVIDER NOTES
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  removal of rectal foreign body;  Surgeon: Estrada Chávez MD;  Location:  OR       Family History   Problem Relation Age of Onset    Diabetes Type 2  Maternal Grandmother     Diabetes Type 2   "Paternal Grandmother     Breast Cancer Paternal Grandmother     Cerebrovascular Disease Father 53    Myocardial Infarction No family hx of     Coronary Artery Disease Early Onset No family hx of     Ovarian Cancer No family hx of     Colon Cancer No family hx of     Depression Mother     Anxiety Disorder Mother        Social History     Tobacco Use    Smoking status: Never    Smokeless tobacco: Never   Substance Use Topics    Alcohol use: No     Alcohol/week: 0.0 standard drinks of alcohol    Drug use: No       acetaminophen (TYLENOL) 500 MG tablet  ondansetron (ZOFRAN ODT) 4 MG ODT tab  acetaminophen (TYLENOL) 500 MG tablet  albuterol (PROAIR HFA/PROVENTIL HFA/VENTOLIN HFA) 108 (90 Base) MCG/ACT inhaler  albuterol (PROVENTIL) (2.5 MG/3ML) 0.083% neb solution  Apixaban Starter Pack (ELIQUIS DVT/PE STARTER PACK) 5 MG TBPK  benzonatate (TESSALON) 100 MG capsule  cetirizine (ZYRTEC) 10 MG tablet  Cholecalciferol (D3 HIGH POTENCY) 25 MCG (1000 UT) CAPS  clonazePAM (KLONOPIN) 0.5 MG tablet  cyclobenzaprine (FLEXERIL) 10 MG tablet  dicyclomine (BENTYL) 10 MG capsule  escitalopram (LEXAPRO) 10 MG tablet  ferrous sulfate (FEROSUL) 325 (65 Fe) MG tablet  hydrOXYzine HCl (ATARAX) 25 MG tablet  insulin pen needle (31G X 8 MM) 31G X 8 MM miscellaneous  montelukast (SINGULAIR) 10 MG tablet  norethindrone (AYGESTIN) 5 MG tablet  ondansetron (ZOFRAN-ODT) 4 MG ODT tab  pantoprazole (PROTONIX) 40 MG EC tablet  polyethylene glycol (MIRALAX) 17 GM/Dose powder  PSYLLIUM PO  Respiratory Therapy Supplies (NEBULIZER) BRENDAN  Semaglutide, 2 MG/DOSE, (OZEMPIC) 8 MG/3ML pen  valACYclovir (VALTREX) 1000 mg tablet          PHYSICAL EXAM     First Vitals:  Patient Vitals for the past 24 hrs:   BP Temp Temp src Pulse Resp SpO2 Height Weight   08/02/24 1410 138/83 97.6  F (36.4  C) Temporal 95 18 98 % 1.575 m (5' 2\") 111.1 kg (245 lb)         PHYSICAL EXAM:   Constitutional: No acute distress.  Neuro: Awake and alert. No focal deficits.  Psych: " Calm and cooperative.  Eyes: PERRL. EOMI. Conjunctivae clear. No crusting or mattering of the lids or lashes.     Nose: Nostrils patent. No congestion or turbinate inflammation.   Mouth: Pink and moist. No erythema or edema of the posterior pharynx. No exudates. No trismus or muffled voice. Handling own secretions. No uvula deviation.   Neck: FROM.   Cardio: Regular rate. Adequate perfusion to extremities. Regular rhythm. No murmurs.  Pulmonary: Oxygenating well on RA. No labored breathing. CTA b/l.  Abdomen: Palpable pain in the upper abdomen diffusely.  BS present. Soft and non-distended.  No rigidity.   Back: Appears to move freely.   Upper extremities: Moves freely.    Lower extremities: Moves freely. No edema.   Skin: Natural color, warm, dry, intact.       RESULTS     LAB:  All pertinent labs reviewed and interpreted  Labs Ordered and Resulted from Time of ED Arrival to Time of ED Departure   ROUTINE UA WITH MICROSCOPIC REFLEX TO CULTURE - Normal       Result Value    Color Urine Colorless      Appearance Urine Clear      Glucose Urine Negative      Bilirubin Urine Negative      Ketones Urine Negative      Specific Gravity Urine 1.006      Blood Urine Negative      pH Urine 7.0      Protein Albumin Urine Negative      Urobilinogen Urine <2.0      Nitrite Urine Negative      Leukocyte Esterase Urine Negative      RBC Urine 0      WBC Urine <1     HCG QUALITATIVE URINE - Normal    hCG Urine Qualitative Negative     BASIC METABOLIC PANEL   HEPATIC FUNCTION PANEL   LIPASE   INFLUENZA A/B, RSV, & SARS-COV2 PCR       RADIOLOGY:  No orders to display       ECG:  EKG was collected and independently interpreted by myself and also confirmed by MD.    Findings: Sinus rhythm with a rate of 87.  No interval prolongation.  No emergent ischemia or STEMI.    Comparisons: Compared to ECG from April 2024, no significant change was found.      PROCEDURES     None         MEDICAL DECISION MAKING:  Obtained supplemental  history:Supplemental history obtained?: No  Reviewed external records: External records reviewed?: Outpatient Record: See HPI  Care impacted by chronic illness:Anticoagulated State, Chronic Pain, Diabetes, and Mental Health  Care significantly affected by social determinants of health:N/A  Did you consider but not order tests?: Work up considered but not performed and documented in chart, if applicable  Did you interpret images independently?: Independent interpretation of ECG and images noted in documentation, when applicable.  Consultation discussion with other provider:Did you involve another provider (consultant, , pharmacy, etc.)?: No  Patient has opted to leave AGAINST MEDICAL ADVICE      FINAL IMPRESSION:    ICD-10-CM    1. Nausea vomiting and diarrhea  R11.2     R19.7       2. Pain of upper abdomen  R10.10             MEDICATIONS GIVEN IN THE EMERGENCY DEPARTMENT:  Medications   sodium chloride 0.9% BOLUS 1,000 mL (has no administration in time range)   ondansetron (ZOFRAN) injection 4 mg (has no administration in time range)   ketorolac (TORADOL) injection 15 mg (has no administration in time range)   alum & mag hydroxide-simethicone (MAALOX) suspension 15 mL (has no administration in time range)   famotidine (PEPCID) injection 20 mg (has no administration in time range)         NEW PRESCRIPTIONS STARTED AT TODAY'S ED VISIT:  New Prescriptions    ACETAMINOPHEN (TYLENOL) 500 MG TABLET    Take 2 tablets (1,000 mg) by mouth every 6 hours as needed for mild pain    ONDANSETRON (ZOFRAN ODT) 4 MG ODT TAB    Take 1 tablet (4 mg) by mouth every 8 hours as needed for nausea            IYajaira, am serving as a scribe to document services personally performed by Dilcia Green PA-C, based on my observation and the provider's statements to me. I, Dilcia Green PA-C attest that Yajaira Huff is acting in a scribe capacity, has observed my performance of the services and has documented them in accordance  with my direction.     Some or all of this documentation has been completed using dictation software and mild grammatical errors may be present. Please contact me with any concerns regarding this.       Dilcia Green PA-C  Emergency Medicine   [facility] EMERGENCY ROOM       Dilcia Green PA-C  08/02/24 1928

## 2024-08-03 NOTE — DISCHARGE INSTRUCTIONS
Make sure to stay hydrated.  [place] diet only as tolerated.  Use the antinausea medication as needed.  Use Tylenol to help with pain.  Return to the ER for any new or worsening symptoms.

## 2024-08-04 ENCOUNTER — HOSPITAL ENCOUNTER (EMERGENCY)
Facility: CLINIC | Age: 34
Discharge: HOME | End: 2024-08-04
Attending: EMERGENCY MEDICINE | Admitting: EMERGENCY MEDICINE
Payer: COMMERCIAL

## 2024-08-04 ENCOUNTER — HOSPITAL ENCOUNTER (EMERGENCY)
Facility: CLINIC | Age: 34
Discharge: HOME | End: 2024-08-05
Attending: EMERGENCY MEDICINE | Admitting: EMERGENCY MEDICINE
Payer: COMMERCIAL

## 2024-08-04 VITALS
SYSTOLIC BLOOD PRESSURE: 136 MMHG | RESPIRATION RATE: 18 BRPM | BODY MASS INDEX: 45.08 KG/M2 | HEART RATE: 87 BPM | TEMPERATURE: 97.2 F | OXYGEN SATURATION: 100 % | HEIGHT: 62 IN | WEIGHT: 245 LBS | DIASTOLIC BLOOD PRESSURE: 72 MMHG

## 2024-08-04 DIAGNOSIS — R10.84 GENERALIZED ABDOMINAL PAIN: ICD-10-CM

## 2024-08-04 DIAGNOSIS — R19.7 VOMITING AND DIARRHEA: ICD-10-CM

## 2024-08-04 DIAGNOSIS — R11.10 VOMITING AND DIARRHEA: ICD-10-CM

## 2024-08-04 DIAGNOSIS — H65.92 MIDDLE EAR EFFUSION, LEFT: ICD-10-CM

## 2024-08-04 PROCEDURE — 99283 EMERGENCY DEPT VISIT LOW MDM: CPT

## 2024-08-04 PROCEDURE — 96361 HYDRATE IV INFUSION ADD-ON: CPT

## 2024-08-04 PROCEDURE — 96374 THER/PROPH/DIAG INJ IV PUSH: CPT

## 2024-08-04 PROCEDURE — 96375 TX/PRO/DX INJ NEW DRUG ADDON: CPT

## 2024-08-04 PROCEDURE — 99285 EMERGENCY DEPT VISIT HI MDM: CPT | Mod: 25

## 2024-08-04 RX ORDER — DEXAMETHASONE 4 MG/1
6 TABLET ORAL ONCE
Qty: 2 TABLET | Refills: 0 | Status: SHIPPED | OUTPATIENT
Start: 2024-08-04 | End: 2024-08-23

## 2024-08-04 ASSESSMENT — COLUMBIA-SUICIDE SEVERITY RATING SCALE - C-SSRS
1. IN THE PAST MONTH, HAVE YOU WISHED YOU WERE DEAD OR WISHED YOU COULD GO TO SLEEP AND NOT WAKE UP?: NO
1. IN THE PAST MONTH, HAVE YOU WISHED YOU WERE DEAD OR WISHED YOU COULD GO TO SLEEP AND NOT WAKE UP?: NO
6. HAVE YOU EVER DONE ANYTHING, STARTED TO DO ANYTHING, OR PREPARED TO DO ANYTHING TO END YOUR LIFE?: NO
6. HAVE YOU EVER DONE ANYTHING, STARTED TO DO ANYTHING, OR PREPARED TO DO ANYTHING TO END YOUR LIFE?: NO
2. HAVE YOU ACTUALLY HAD ANY THOUGHTS OF KILLING YOURSELF IN THE PAST MONTH?: NO
2. HAVE YOU ACTUALLY HAD ANY THOUGHTS OF KILLING YOURSELF IN THE PAST MONTH?: NO

## 2024-08-04 ASSESSMENT — ACTIVITIES OF DAILY LIVING (ADL)
ADLS_ACUITY_SCORE: 43
ADLS_ACUITY_SCORE: 43

## 2024-08-04 NOTE — ED PROVIDER NOTES
"  Emergency Department Note      History of Present Illness     Chief Complaint   Ear Fullness    HPI   Nevin Alvarado is a 33 year old female with a history as noted below who presents to the ED for left ear fullness. Over the past couple of months, the patient has been having issues with her left ear. She was originally seen at Urgent Care a couple of months ago and was told she had an ear infection. She was then seen by ENT the next day who informed her she did not have an infection but does have some hearing loss. They did a brain MRI which was negative. She was given steroids with no relief. She has a follow up in a year. This morning, she woke up with increased pressure in her left ear and on the left side of her head. She also was not able to hear out of that ear. She denies any seasonal allergies. She notes she has been experiencing nausea, vomiting, and dizziness since the original Urgent Care visit.     Independent Historian   None    Review of External Notes   None    Past Medical History   Medical History and Problem List   Major depressive disorder  Anxiety  Suicidal ideation  Reactive air disease  Type 2 diabetes mellitus  PTSD  Sleep apnea  CIDP  Polyneuropathy  Sensorineural hearing loss  Morbid obesity  Hypertension  Borderline personality disorder  Scoliosis  GERD  Obesity    Medications   Norethindrone  Metoclopramide  Valacyclovir  Pregabalin  Clonazepam  Hydroxyzine HCl  Cyclobenzaprine  Meclizine  Semaglutide  Hydromorphone  Apixaban  Montelukast  Pantoprazole  Amitriptyline  Ferrous sulfate    Surgical History   Abdomen surgery  EGD X 28  Exploratory laparotomy  Mammoplasty  Carpal tunnel release  Rectal foreign body removal  Sigmoidoscopy    Physical Exam   Patient Vitals for the past 24 hrs:   BP Temp Temp src Pulse Resp SpO2 Height Weight   08/04/24 1205 136/72 97.2  F (36.2  C) Temporal 87 18 100 % 1.575 m (5' 2\") 111.1 kg (245 lb)     Physical Exam  Constitutional: Alert, " attentive, GCS 15   HENT:    Ears: Left middle ear effusion without erythema.  Eyes: EOM are normal, anicteric, conjugate gaze  CV: distal extremities warm, well perfused  Chest: Non-labored breathing on RA  Neurological: Alert, attentive, moving all extremities equally.   Skin: Skin is warm and dry.      Diagnostics   None  ED Course    Medications Administered   Medications - No data to display    Procedures   Procedures     Discussion of Management   None    ED Course   ED Course as of 08/04/24 1522   Sun Aug 04, 2024   1255 I obtained history and examined the patient as noted above.       Additional Documentation  None    Medical Decision Making / Diagnosis     BEATRIZ Alvarado is a 33 year old female extensive psychiatric history presenting for evaluation of muffled hearing and left ear pain.  She has been seen by ENT before diagnosed with hearing loss, underwent comprehensive evaluation with negative MRI.  On exam she does have a middle ear effusion consistent with eustachian tube dysfunction, there is no evidence of infection.  I not suspect deep space infection.  I recommended continued over-the-counter medication, she was given no additional dose of steroid though she is on multiple doses over the last few weeks and I recommended ENT follow-up.    Disposition   The patient was discharged.     Diagnosis     ICD-10-CM    1. Middle ear effusion, left  H65.92            Discharge Medications   Discharge Medication List as of 8/4/2024  1:05 PM        START taking these medications    Details   dexAMETHasone (DECADRON) 4 MG tablet Take 1.5 tablets (6 mg) by mouth once for 1 dose, Disp-2 tablet, R-0, Local Print           Siddharth Marrufo MD  [employer]  3:18 PM 08/04/24     Scribe Disclosure:  Christian MURRAY, am serving as a scribe at 1:08 PM on 8/4/2024 to document services personally performed by Siddharth Marrufo MD based on my observations and the  provider's statements to me.        Siddharth Marrufo MD  08/04/24 0118

## 2024-08-04 NOTE — DISCHARGE INSTRUCTIONS
You continue to take your medications as prescribed, you can try the Decadron.  I do recommend following up with your ear nose and throat doctor for recheck.

## 2024-08-04 NOTE — ED TRIAGE NOTES
Pt sts she has had muffled hearing left for 2 months and today can hear nothing     Triage Assessment (Adult)       Row Name 08/04/24 1208          Triage Assessment    Airway WDL WDL        Respiratory WDL    Respiratory WDL WDL        Skin Circulation/Temperature WDL    Skin Circulation/Temperature WDL WDL        Cardiac WDL    Cardiac WDL WDL        Peripheral/Neurovascular WDL    Peripheral Neurovascular WDL WDL        Cognitive/Neuro/Behavioral WDL    Cognitive/Neuro/Behavioral WDL WDL

## 2024-08-05 VITALS
RESPIRATION RATE: 20 BRPM | OXYGEN SATURATION: 97 % | HEART RATE: 79 BPM | TEMPERATURE: 98.5 F | DIASTOLIC BLOOD PRESSURE: 76 MMHG | SYSTOLIC BLOOD PRESSURE: 126 MMHG

## 2024-08-05 LAB
ALBUMIN SERPL BCG-MCNC: 4 G/DL (ref 3.5–5.2)
ALP SERPL-CCNC: 69 U/L (ref 40–150)
ALT SERPL W P-5'-P-CCNC: 19 U/L (ref 0–50)
ANION GAP SERPL CALCULATED.3IONS-SCNC: 13 MMOL/L (ref 7–15)
AST SERPL W P-5'-P-CCNC: 14 U/L (ref 0–45)
BASOPHILS # BLD AUTO: 0 10E3/UL (ref 0–0.2)
BASOPHILS NFR BLD AUTO: 0 %
BILIRUB SERPL-MCNC: 0.3 MG/DL
BUN SERPL-MCNC: 8.8 MG/DL (ref 6–20)
CALCIUM SERPL-MCNC: 9.5 MG/DL (ref 8.8–10.4)
CHLORIDE SERPL-SCNC: 106 MMOL/L (ref 98–107)
CREAT SERPL-MCNC: 0.69 MG/DL (ref 0.51–0.95)
EGFRCR SERPLBLD CKD-EPI 2021: >90 ML/MIN/1.73M2
EOSINOPHIL # BLD AUTO: 0.1 10E3/UL (ref 0–0.7)
EOSINOPHIL NFR BLD AUTO: 1 %
ERYTHROCYTE [DISTWIDTH] IN BLOOD BY AUTOMATED COUNT: 12.9 % (ref 10–15)
GLUCOSE SERPL-MCNC: 108 MG/DL (ref 70–99)
HCG SERPL QL: NEGATIVE
HCO3 SERPL-SCNC: 23 MMOL/L (ref 22–29)
HCT VFR BLD AUTO: 39 % (ref 35–47)
HGB BLD-MCNC: 12.9 G/DL (ref 11.7–15.7)
HOLD SPECIMEN: NORMAL
IMM GRANULOCYTES # BLD: 0 10E3/UL
IMM GRANULOCYTES NFR BLD: 0 %
LIPASE SERPL-CCNC: 34 U/L (ref 13–60)
LYMPHOCYTES # BLD AUTO: 1.6 10E3/UL (ref 0.8–5.3)
LYMPHOCYTES NFR BLD AUTO: 16 %
MCH RBC QN AUTO: 30 PG (ref 26.5–33)
MCHC RBC AUTO-ENTMCNC: 33.1 G/DL (ref 31.5–36.5)
MCV RBC AUTO: 91 FL (ref 78–100)
MONOCYTES # BLD AUTO: 0.5 10E3/UL (ref 0–1.3)
MONOCYTES NFR BLD AUTO: 5 %
NEUTROPHILS # BLD AUTO: 7.7 10E3/UL (ref 1.6–8.3)
NEUTROPHILS NFR BLD AUTO: 77 %
NRBC # BLD AUTO: 0 10E3/UL
NRBC BLD AUTO-RTO: 0 /100
PLATELET # BLD AUTO: 294 10E3/UL (ref 150–450)
POTASSIUM SERPL-SCNC: 3.8 MMOL/L (ref 3.4–5.3)
PROT SERPL-MCNC: 6.7 G/DL (ref 6.4–8.3)
RBC # BLD AUTO: 4.3 10E6/UL (ref 3.8–5.2)
SODIUM SERPL-SCNC: 142 MMOL/L (ref 135–145)
WBC # BLD AUTO: 10.1 10E3/UL (ref 4–11)

## 2024-08-05 PROCEDURE — 84703 CHORIONIC GONADOTROPIN ASSAY: CPT | Performed by: EMERGENCY MEDICINE

## 2024-08-05 PROCEDURE — 250N000011 HC RX IP 250 OP 636: Performed by: EMERGENCY MEDICINE

## 2024-08-05 PROCEDURE — 258N000003 HC RX IP 258 OP 636: Performed by: EMERGENCY MEDICINE

## 2024-08-05 PROCEDURE — 250N000013 HC RX MED GY IP 250 OP 250 PS 637: Performed by: EMERGENCY MEDICINE

## 2024-08-05 PROCEDURE — 80053 COMPREHEN METABOLIC PANEL: CPT | Performed by: EMERGENCY MEDICINE

## 2024-08-05 PROCEDURE — 36415 COLL VENOUS BLD VENIPUNCTURE: CPT | Performed by: EMERGENCY MEDICINE

## 2024-08-05 PROCEDURE — 85025 COMPLETE CBC W/AUTO DIFF WBC: CPT | Performed by: EMERGENCY MEDICINE

## 2024-08-05 PROCEDURE — 83690 ASSAY OF LIPASE: CPT | Performed by: EMERGENCY MEDICINE

## 2024-08-05 RX ORDER — HALOPERIDOL 5 MG/ML
5 INJECTION INTRAMUSCULAR ONCE
Status: COMPLETED | OUTPATIENT
Start: 2024-08-05 | End: 2024-08-05

## 2024-08-05 RX ORDER — DICYCLOMINE HCL 20 MG
20 TABLET ORAL ONCE
Status: COMPLETED | OUTPATIENT
Start: 2024-08-05 | End: 2024-08-05

## 2024-08-05 RX ORDER — HALOPERIDOL 2 MG/1
2 TABLET ORAL 3 TIMES DAILY PRN
Qty: 9 TABLET | Refills: 0 | Status: SHIPPED | OUTPATIENT
Start: 2024-08-05

## 2024-08-05 RX ORDER — DICYCLOMINE HCL 20 MG
20 TABLET ORAL 4 TIMES DAILY PRN
Qty: 12 TABLET | Refills: 0 | Status: SHIPPED | OUTPATIENT
Start: 2024-08-05

## 2024-08-05 RX ADMIN — PROCHLORPERAZINE EDISYLATE 10 MG: 5 INJECTION INTRAMUSCULAR; INTRAVENOUS at 01:37

## 2024-08-05 RX ADMIN — HALOPERIDOL LACTATE 5 MG: 5 INJECTION, SOLUTION INTRAMUSCULAR at 01:58

## 2024-08-05 RX ADMIN — SODIUM CHLORIDE 1000 ML: 9 INJECTION, SOLUTION INTRAVENOUS at 01:35

## 2024-08-05 RX ADMIN — DICYCLOMINE HYDROCHLORIDE 20 MG: 20 TABLET ORAL at 01:58

## 2024-08-05 ASSESSMENT — ACTIVITIES OF DAILY LIVING (ADL)
ADLS_ACUITY_SCORE: 43

## 2024-08-05 NOTE — ED PROVIDER NOTES
Emergency Department Note      History of Present Illness     Chief Complaint   Abdominal Pain and Nausea & Vomiting    [facility]   Nevin Alvarado is a 33 year old female with a history of GERD, hypertension, type 2 diabetes mellitus, and BPD, who presents to the ED for abdominal pain and nausea & vomiting, which onset Friday (8/2/24). She describes her abdominal pain as cramping located below her belly button. The abdominal pain was accompanied by 4 episodes of vomiting and 7 episodes of diarrhea. The patient presented to the ED on Friday, but left due to the long wait. She was prescribed ibuprofen and Zofran while in the waiting room, but neither have provided relief. The patient no longer has diarrhea , but her abdominal pain has worsened and she has constant nausea. Her pain causes her to feel lightheaded, shaky, and weak. The nausea worsens with movement, and she endorses vomiting while waiting for EMS to arrive tonight. She also states she has head pain, which onset over the past couple hours. The patient takes amitriptyline and lyrica regularly for nerve pain. She denies any chance of pregnancy. She no longer has her gallbladder or appendix, but is concerned her symptoms are related to her pancreas.     Independent Historian   None    Review of External Notes   Care plan reviewed, from several years ago, still has CTs noted this year including two of the abdomen last month, had brain MRI last month as well which were normal    Past Medical History     Medical History and Problem List   Major depressive disorder  Anxiety  Suicidal ideation  Reactive air disease  Type 2 diabetes mellitus  PTSD  Sleep apnea  CIDP  Polyneuropathy  Sensorineural hearing loss  Morbid obesity  Hypertension  Borderline personality disorder  Scoliosis  GERD  Obesity    Medications   Norethindrone  Metoclopramide  Valacyclovir  Pregabalin  Clonazepam  Hydroxyzine  HCl  Cyclobenzaprine  Meclizine  Semaglutide  Hydromorphone  Apixaban  Montelukast  Pantoprazole  Amitriptyline  Ferrous sulfate    Surgical History   Abdomen surgery  EGD X 28  Exploratory laparotomy  Mammoplasty  Carpal tunnel release  Rectal foreign body removal  Sigmoidoscopy    Physical Exam     Patient Vitals for the past 24 hrs:   BP Temp Temp src Pulse Resp SpO2   08/04/24 2330 132/78 -- -- 71 -- 97 %   08/04/24 2327 128/82 -- -- 85 -- 98 %   08/04/24 2310 134/79 98.5  F (36.9  C) Oral 91 20 97 %     Physical Exam  Eyes:               Sclera white; Pupils are equal and round  ENT:                External ears and nares normal  Resp:               Non-labored, no retractions or accessory muscle use  GI:                   Abdomen is soft, mild diffuse tenderness                          No rebound tenderness or peritoneal features  MS:                  Moves all extremities  Skin:                Warm and dry  Neuro:             Speech is normal and fluent. No apparent deficit.    Diagnostics     Lab Results   Labs Ordered and Resulted from Time of ED Arrival to Time of ED Departure   COMPREHENSIVE METABOLIC PANEL - Abnormal       Result Value    Sodium 142      Potassium 3.8      Carbon Dioxide (CO2) 23      Anion Gap 13      Urea Nitrogen 8.8      Creatinine 0.69      GFR Estimate >90      Calcium 9.5      Chloride 106      Glucose 108 (*)     Alkaline Phosphatase 69      AST 14      ALT 19      Protein Total 6.7      Albumin 4.0      Bilirubin Total 0.3     LIPASE - Normal    Lipase 34     HCG QUALITATIVE PREGNANCY - Normal    hCG Serum Qualitative Negative     CBC WITH PLATELETS AND DIFFERENTIAL    WBC Count 10.1      RBC Count 4.30      Hemoglobin 12.9      Hematocrit 39.0      MCV 91      MCH 30.0      MCHC 33.1      RDW 12.9      Platelet Count 294      % Neutrophils 77      % Lymphocytes 16      % Monocytes 5      % Eosinophils 1      % Basophils 0      % Immature Granulocytes 0      NRBCs per 100 WBC 0       Absolute Neutrophils 7.7      Absolute Lymphocytes 1.6      Absolute Monocytes 0.5      Absolute Eosinophils 0.1      Absolute Basophils 0.0      Absolute Immature Granulocytes 0.0      Absolute NRBCs 0.0         Imaging   No orders to display     Independent Interpretation   None    ED Course      Medications Administered   Medications   sodium chloride 0.9% BOLUS 1,000 mL (0 mLs Intravenous Stopped 8/5/24 0408)   prochlorperazine (COMPAZINE) injection 10 mg (10 mg Intravenous $Given 8/5/24 0137)   haloperidol lactate (HALDOL) injection 5 mg (5 mg Intravenous $Given 8/5/24 0158)   dicyclomine (BENTYL) tablet 20 mg (20 mg Oral $Given 8/5/24 0158)       Procedures   Procedures     Discussion of Management   None    ED Course   ED Course as of 08/05/24 1603   Sun Aug 04, 2024   2345 I obtained the history and examined the patient as above.    Mon Aug 05, 2024   0343 I rechecked and updated the patient as above    0354 I tried calling the guardian, but it went straight to voicemail.        Additional Documentation  None    Medical Decision Making / Diagnosis     MDM   No focal tenderness to suggest cholecystitis (surgically absent), appendicitis, diverticulitis.  Obstruction not suspected clinically as there is also diarrhea and no distension.  Labs without evidence for hepatitis, biliary obstruction, pancreatitis, severe electrolyte derangements or DENISE.  Given care plan and recent normal CTs, will not image today.  No focal neurologic findings with the headache, had recent brain MRI that was normal.  Acute neurologic event considered unlikely, no brain imaging or LP.  Avoided IV narcotics due to care plan and lack of acute pathology that is managed with these medications.  Had improvement in nausea with compazine.  Still having pain.  Haldol given due to effects on headaches, nausea, pain.  Sleeping, easily awoken, feels much better.  Interested in having the same at home.  Discussed that it is an antipsychotic  that is being used for effects on her symptoms.  She indicates understanding and remains interested in prn use at home.  Prescribed.  Follow-up with PCP for ongoing symptoms.  Return if refractory to medications.      Disposition   The patient was discharged.     Diagnosis     ICD-10-CM    1. Vomiting and diarrhea  R11.10     R19.7       2. Generalized abdominal pain  R10.84            Discharge Medications   Discharge Medication List as of 8/5/2024  4:10 AM        START taking these medications    Details   dicyclomine (BENTYL) 20 MG tablet Take 1 tablet (20 mg) by mouth 4 times daily as needed (abdominal cramps, diarrhea), Disp-12 tablet, R-0, E-Prescribe      haloperidol (HALDOL) 2 MG tablet Take 1 tablet (2 mg) by mouth 3 times daily as needed (uncontrolled vomiting/abdominal pain), Disp-9 tablet, R-0, E-Prescribe               Scribe Disclosure:  Celeste MURRAY, am serving as a scribe at 11:58 PM on 8/4/2024 to document services personally performed by Krystin Green MD based on my observations and the provider's statements to me.        Krystin Green MD  08/05/24 5340

## 2024-08-05 NOTE — ED TRIAGE NOTES
Pt BIBA from  in [facility] d/t N/V and abdominal pain since Friday. Rates pain 10/10. Was seen at [facility] on Friday and prescribed zofran but pt left d/t long wait time. VSS, ABCs intact, A&Ox4.

## 2024-08-11 ENCOUNTER — APPOINTMENT (OUTPATIENT)
Dept: CT IMAGING | Facility: CLINIC | Age: 34
End: 2024-08-11
Attending: EMERGENCY MEDICINE
Payer: COMMERCIAL

## 2024-08-11 ENCOUNTER — HOSPITAL ENCOUNTER (EMERGENCY)
Facility: CLINIC | Age: 34
Discharge: HOME | End: 2024-08-11
Attending: EMERGENCY MEDICINE | Admitting: EMERGENCY MEDICINE
Payer: COMMERCIAL

## 2024-08-11 VITALS
DIASTOLIC BLOOD PRESSURE: 76 MMHG | OXYGEN SATURATION: 98 % | HEART RATE: 79 BPM | RESPIRATION RATE: 16 BRPM | SYSTOLIC BLOOD PRESSURE: 141 MMHG | TEMPERATURE: 98.3 F | BODY MASS INDEX: 45.08 KG/M2 | WEIGHT: 245 LBS | HEIGHT: 62 IN

## 2024-08-11 DIAGNOSIS — R51.9 ACUTE NONINTRACTABLE HEADACHE, UNSPECIFIED HEADACHE TYPE: ICD-10-CM

## 2024-08-11 PROCEDURE — 99284 EMERGENCY DEPT VISIT MOD MDM: CPT | Mod: 25 | Performed by: EMERGENCY MEDICINE

## 2024-08-11 PROCEDURE — 250N000013 HC RX MED GY IP 250 OP 250 PS 637: Performed by: EMERGENCY MEDICINE

## 2024-08-11 PROCEDURE — 250N000011 HC RX IP 250 OP 636: Performed by: EMERGENCY MEDICINE

## 2024-08-11 PROCEDURE — 70450 CT HEAD/BRAIN W/O DYE: CPT

## 2024-08-11 RX ORDER — ONDANSETRON 4 MG/1
4 TABLET, ORALLY DISINTEGRATING ORAL ONCE
Status: COMPLETED | OUTPATIENT
Start: 2024-08-11 | End: 2024-08-11

## 2024-08-11 RX ORDER — KETOROLAC TROMETHAMINE 30 MG/ML
30 INJECTION, SOLUTION INTRAMUSCULAR; INTRAVENOUS ONCE
Status: DISCONTINUED | OUTPATIENT
Start: 2024-08-11 | End: 2024-08-11

## 2024-08-11 RX ORDER — ACETAMINOPHEN 325 MG/1
975 TABLET ORAL ONCE
Status: COMPLETED | OUTPATIENT
Start: 2024-08-11 | End: 2024-08-11

## 2024-08-11 RX ADMIN — ONDANSETRON 4 MG: 4 TABLET, ORALLY DISINTEGRATING ORAL at 19:59

## 2024-08-11 RX ADMIN — ACETAMINOPHEN 975 MG: 325 TABLET ORAL at 19:36

## 2024-08-11 ASSESSMENT — ACTIVITIES OF DAILY LIVING (ADL)
ADLS_ACUITY_SCORE: 43
ADLS_ACUITY_SCORE: 41
ADLS_ACUITY_SCORE: 43

## 2024-08-11 NOTE — ED TRIAGE NOTES
"Pt states she woke from a nap, got up to walk to the bathroom and had a sudden \"popping\" sensation to the right side of her head.  Now C/O severe pain and pressure and blurred vision.     Triage Assessment (Adult)       Row Name 08/11/24 1345          Triage Assessment    Airway WDL WDL        Respiratory WDL    Respiratory WDL WDL        Skin Circulation/Temperature WDL    Skin Circulation/Temperature WDL WDL        Cardiac WDL    Cardiac WDL WDL        Peripheral/Neurovascular WDL    Peripheral Neurovascular WDL WDL        Cognitive/Neuro/Behavioral WDL    Cognitive/Neuro/Behavioral WDL WDL                     "

## 2024-08-11 NOTE — ED PROVIDER NOTES
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 Flexible Sigmoidoscopy, Retrieval of Foreign Body;  Surgeon: Sasha Ivan MD;  Location: [facility];  Service: General    RELEASE CARPAL TUNNEL Bilateral     RELEASE CARPAL TUNNEL Bilateral     REMOVAL,  FOREIGN BODY, RECTUM N/A 7/21/2021    Procedure: MANUAL RETREIVALOF FOREIGN OBJECT- RECTUM.;  Surgeon: Aleksandra Gerber MD;  Location: [facility] OR    SIGMOIDOSCOPY FLEXIBLE N/A 1/10/2017    Procedure: SIGMOIDOSCOPY FLEXIBLE;  Surgeon: Annmarie Haynes MD;  Location: [facility] OR    SIGMOIDOSCOPY FLEXIBLE N/A 5/8/2018    Procedure: SIGMOIDOSCOPY FLEXIBLE;  flex sig with foreign body removal using snare and rattooth forcep;  Surgeon: Soham Cano MD;  Location:  GI    SIGMOIDOSCOPY FLEXIBLE N/A 2/20/2019    Procedure: Exam under anesthesia Colonoscopy with attempted  removal of rectal foreign body;  Surgeon: Estrada Chávez MD;  Location: [facility] OR         CURRENT MEDICATIONS:    Prior to Admission medications    Medication Sig Start Date End Date Taking? Authorizing Provider   acetaminophen (TYLENOL) 500 MG tablet Take 2 tablets (1,000 mg) by mouth every 6 hours as needed for mild pain 8/2/24   Dilcia Green PA-C   acetaminophen (TYLENOL) 500 MG tablet Take 2 tablets (1,000 mg) by mouth every 6 hours as needed for pain or fever 4/16/24   Shelia Goyal MD   albuterol (PROAIR HFA/PROVENTIL HFA/VENTOLIN HFA) 108 (90 Base) MCG/ACT inhaler Inhale 2 puffs into the lungs every 6 hours as needed for shortness of breath / dyspnea or wheezing 1/14/22   Nish Saucedo MD   albuterol (PROVENTIL) (2.5 MG/3ML) 0.083% neb solution Take 1 vial (2.5 mg) by nebulization every 6 hours as needed for shortness of breath or wheezing 11/22/23   Ohl, Marcos Isabel DO   Apixaban Starter Pack (ELIQUIS DVT/PE STARTER PACK) 5 MG TBPK Take 2 tablets (10 mg) by mouth twice daily for 7 days then take 1 tablet (5 mg) twice daily thereafter 4/22/24   Reported, Patient   benzonatate (TESSALON) 100 MG capsule Take 1 capsule (100 mg) by mouth 3 times daily as needed for cough 1/2/24   Tea Kirkland MD   cetirizine (ZYRTEC) 10 MG tablet Take 1 tablet (10 mg) by mouth daily 2/16/22   Marian Ledesma MD   Cholecalciferol (D3 HIGH  POTENCY) 25 MCG (1000 UT) CAPS Take 50 mcg by mouth daily 12/19/22   Unknown, Entered By History   clonazePAM (KLONOPIN) 0.5 MG tablet Take 1 tablet (0.5 mg) by mouth daily as needed for anxiety 4/16/24   Shelia Goyal MD   cyclobenzaprine (FLEXERIL) 10 MG tablet Take 1 tablet (10 mg) by mouth 3 times daily as needed for muscle spasms 10/20/23   Jeffy Bhardwaj MD   dexAMETHasone (DECADRON) 4 MG tablet Take 1.5 tablets (6 mg) by mouth once for 1 dose 8/4/24 8/4/24  Siddharth Marrufo MD   dicyclomine (BENTYL) 20 MG tablet Take 1 tablet (20 mg) by mouth 4 times daily as needed (abdominal cramps, diarrhea) 8/5/24   Krystin Green MD   escitalopram (LEXAPRO) 10 MG tablet Take 10 mg by mouth 4/24/24   Reported, Patient   ferrous sulfate (FEROSUL) 325 (65 Fe) MG tablet Take 1 tablet (325 mg) by mouth daily (with breakfast) 2/17/22   Marian Ledesma MD   haloperidol (HALDOL) 2 MG tablet Take 1 tablet (2 mg) by mouth 3 times daily as needed (uncontrolled vomiting/abdominal pain) 8/5/24   Krystin Green MD   hydrOXYzine HCl (ATARAX) 25 MG tablet Take 25 mg by mouth 4/22/24   Reported, Patient   insulin pen needle (31G X 8 MM) 31G X 8 MM miscellaneous Use 1 pen needles daily or as directed. 7/25/24   Sharon Toro NP   montelukast (SINGULAIR) 10 MG tablet Take 10 mg by mouth every evening 6/21/22   Reported, Patient   norethindrone (AYGESTIN) 5 MG tablet Take 5 mg by mouth daily    Unknown, Entered By History   ondansetron (ZOFRAN ODT) 4 MG ODT tab Take 1 tablet (4 mg) by mouth every 8 hours as needed for nausea 8/2/24   Dilcia Green PA-C   ondansetron (ZOFRAN-ODT) 4 MG ODT tab Take 1 tablet (4 mg) by mouth every 8 hours as needed for nausea 2/16/22   Marian Ledesma MD   pantoprazole (PROTONIX) 40 MG EC tablet Take 40 mg by mouth daily 3/15/24   Reported, Patient   polyethylene glycol (MIRALAX) 17 GM/Dose powder Take 17 g by mouth daily as needed for constipation 3/15/24   Reported, Patient    PSYLLIUM PO Take 1 tsp by mouth daily 3/15/24   Reported, Patient   Respiratory Therapy Supplies (NEBULIZER) BRENDAN Nebulizer device.  Albuterol nebulization every 2 hours as needed for shortness of breath or wheezing. 1/14/22   Nish Saucedo MD   Semaglutide, 2 MG/DOSE, (OZEMPIC) 8 MG/3ML pen Inject 2 mg Subcutaneous every 7 days 6/6/24   Sharon Toro NP   valACYclovir (VALTREX) 1000 mg tablet Take 2,000 mg by mouth 2 times daily as needed Take 2 tablets by mouth two times daily for one day. Use as needed at onset of cold sore.    Unknown, Entered By History        ALLERGIES:  Allergies   Allergen Reactions    Amoxicillin-Pot Clavulanate Other (See Comments), Swelling and Rash     PN: facial swelling, left side. Also had cortisone injection the same day as she started the Augmentin.          Influenza Vaccines Other (See Comments) and Nausea and Vomiting     HUT Reaction: Nausea And Vomiting; HUT Reaction Type: Intolerance; HUT Severity: Low; HUT Noted: [id number]    Latex Rash           Oseltamivir Hives    Penicillins Anaphylaxis    Vancomycin Itching, Swelling and Rash     Flushed face, very itchy    Hydrocodone Nausea and Vomiting and GI Disturbance     vomiting for days    Blood-Group Specific Substance Other (See Comments)     Patient has an anti-Cw and non-specific antibodies. Blood product orders may be delayed. Draw one red top and two purple top tubes for all type/screen/crossmatch orders.  Patient has anti-Cw, anti-K (Angella), Warm auto and nonspecific antibodies. Blood products may be delayed. Draw patient 24 hours prior to transfusion. Draw one red top and two purple top tubes for all type and screen orders.    Clavulanic Acid Angioedema    Haemophilus B Polysaccharide Vaccine Nausea and Vomiting    Oxycodone Swelling    Adhesive Tape Rash     Silicone type  Adhesive allergy      Band-Aid Anti-Itch      Other reaction(s): adhesive allergy    Cephalosporins Rash    Lamotrigine Rash     Possibly associated  with Lamictal.     Naltrexone Other (See Comments)     Reaction(s): Vivid dreams.    No Clinical Screening - See Comments Other (See Comments)     History of swallowing sharp metallic objects. She should not be prescribed lancets due to posed risk of swallowing.        FAMILY HISTORY:  Family History   Problem Relation Age of Onset    Diabetes Type 2  Maternal Grandmother     Diabetes Type 2  Paternal Grandmother     Breast Cancer Paternal Grandmother     Cerebrovascular Disease Father 53    Myocardial Infarction No family hx of     Coronary Artery Disease Early Onset No family hx of     Ovarian Cancer No family hx of     Colon Cancer No family hx of     Depression Mother     Anxiety Disorder Mother        SOCIAL HISTORY:   Social History     Socioeconomic History    Marital status: Single   Occupational History    Occupation: On disability   Tobacco Use    Smoking status: Never    Smokeless tobacco: Never   Substance and Sexual Activity    Alcohol use: No     Alcohol/week: 0.0 standard drinks of alcohol    Drug use: No    Sexual activity: Not Currently     Partners: Male     Birth control/protection: I.U.D.     Comment: IUD - Mirena - placed July, 2015   Social History Narrative    Single.    Living in independent living portion of [employer].    On disability.    No regular exercise.      Social Determinants of Health     Financial Resource Strain: Low Risk  (6/16/2023)    [facility]    Financial Resource Strain     Difficulty of Paying Living Expenses: 3   Food Insecurity: No Food Insecurity (6/16/2023)    [facility], [facility]    Food Insecurity     Worried About Running Out of Food in the Last Year: 1   Transportation Needs: No Transportation Needs (6/16/2023)    [facility], [facility]  "[email or web address] [facility]    Transportation Needs     Lack of Transportation (Medical): 1   Social Connections: Socially Integrated (6/16/2023)    [facility], [facility] [employer] [facility]    Social Connections     Frequency of Communication with Friends and Family: 0   Interpersonal Safety: Unknown (4/27/2024)    Received from [facility]    Humiliation, Afraid, Rape, and Kick questionnaire     Physically Abused: No     Sexually Abused: No   Housing Stability: Low Risk  (6/16/2023)    [facility], [facility]    Housing Stability     Unable to Pay for Housing in the Last Year: 1       VITALS:  Patient Vitals for the past 24 hrs:   BP Temp Temp src Pulse Resp SpO2 Height Weight   08/11/24 2045 (!) 141/76 -- -- 79 -- 98 % -- --   08/11/24 1938 132/69 -- -- 83 -- 96 % -- --   08/11/24 1930 -- -- -- 82 -- 99 % -- --   08/11/24 1901 136/88 -- -- 93 -- 93 % -- --   08/11/24 1742 (!) 148/84 98.3  F (36.8  C) Oral 88 16 98 % 1.575 m (5' 2\") 111.1 kg (245 lb)        PHYSICAL EXAM    Constitutional:  Awake, alert, in no apparent distress  HENT:  Normocephalic, Atraumatic with no scalp hematomas or skull depressions, no signs of basilar skull injury. Bilateral external ears normal and TMs clear bilaterally. Oropharynx moist. Nose normal. Neck- Normal range of motion with no guarding, No midline cervical tenderness and moves neck freely with no signs of meningeal irritation, Supple, No stridor.   Eyes:  PERRL, EOMI with no signs of entrapment, Conjunctiva normal, No discharge.   Respiratory:  Normal breath sounds, No respiratory distress, No wheezing.    Cardiovascular:  Normal heart rate, Normal rhythm, No appreciable rubs or gallops.   GI:  Soft, No tenderness, No distension, No palpable masses  Musculoskeletal:  Intact distal pulses, No edema. Good range of motion in " all major joints. No tenderness to palpation or major deformities noted.  Integument:  Warm, Dry, No erythema, No rash or vesicular lesions  Neurologic:  Alert & oriented, Normal motor function, Normal sensory function, No focal deficits noted.  Cranial nerves II through XII intact.  No facial droop or pronator drift.  No nystagmus.  Psychiatric:  Affect normal, Judgment normal, Mood normal.     LAB:  All pertinent labs reviewed and interpreted.  Results for orders placed or performed during the hospital encounter of 08/11/24   CT Head w/o Contrast    Impression    IMPRESSION:  1.  No acute intracranial process.       RADIOLOGY:  CT Head w/o Contrast   Final Result   IMPRESSION:   1.  No acute intracranial process.             EKG:      I have independently reviewed and interpreted the EKG(s) documented above.    PROCEDURES:        [employer] Documentation:       ISKY MD, am serving as a scribe to document services personally performed by Sky Pereira MD, based on my observation and the provider's statements to me. I, Sky Pereira MD attest that SKY PEREIRA MD is acting in a scribe capacity, has observed my performance of the services and has documented them in accordance with my direction.    Sky Pereira M.D.  Emergency Medicine  [facility] EMERGENCY ROOM  [address] [facility] [id number] [phone]  Dept: [phone]       Sky Pereira MD  08/11/24 1772

## 2024-08-11 NOTE — ED NOTES
Introduced self to patient.  Whiteboard updated.  Plan of care and length of time discussed with patient.  Will continue to monitor. Catherine Casillas RN.......8/11/2024 6:39 PM

## 2024-08-11 NOTE — Clinical Note
Nevin Alvarado was seen and treated in our emergency department on 8/11/2024.  She may return to work on 08/13/2024.       If you have any questions or concerns, please don't hesitate to call.      Sky Pereira MD

## 2024-08-13 NOTE — TELEPHONE ENCOUNTER
Spoke with pt, they relayed that per care planning with provider, that 9/10 egd should be cancelled.  That is confirmed:  - Cancel upper endoscopy scheduled for 9/2024     Endoscopy scheduling messaged to assist with cancelling procedure.  Pt in agreement with the plan.

## 2024-08-21 NOTE — PROGRESS NOTES
"Video-Visit Details    Type of service:  Video Visit    Video Start Time: 8:01 AM  Video End Time: 8:28 AM     Originating Location (pt. Location): Home    Distant Location (provider location): Offsite (providers home) [facility] [place]     Platform used for Video Visit: [facility]    Return Bariatric Nutrition Consultation Note    Reason For Visit: Nutrition Assessment    Nevin Alvarado is a 33 year old presenting today for return bariatric nutrition consult.  Currently working on medical weight management. Patient is accompanied by self.  This is pt's 7th nutrition visit.    Pt referred by Sharon Toro NP on September 12, 2023.  CO-MORBIDITIES OF OBESITY INCLUDE:        9/12/2023    12:48 PM   --   I have the following health issues associated with obesity Type II Diabetes    High Blood Pressure    Sleep Apnea    Polycystic Ovarian Syndrome    GERD (Reflux)    Fatty Liver    Asthma    Stress Incontinence     SUPPORT:      9/12/2023    12:48 PM   Support System Reviewed With Patient   Who do you have in your support network that can be available to help you for the first 2 weeks after surgery? I live in a group home with 24 hour staff, mom   Who can you count on for support throughout your weight loss surgery journey? a friend, and my therapist     ANTHROPOMETRICS:  Initial Consult Weight: 309 lb on 9/13/23  Estimated body mass index is 44.81 kg/m  as calculated from the following:    Height as of this encounter: 1.575 m (5' 2\").    Weight as of this encounter: 111.1 kg (245 lb).  Current Weight: 245 lb per pt report     Wt Readings from Last 5 Encounters:   08/22/24 111.1 kg (245 lb)   08/11/24 111.1 kg (245 lb)   08/04/24 111.1 kg (245 lb)   08/02/24 111.1 kg (245 lb)   07/25/24 110.7 kg (244 lb)     Required weight loss goal pre-op: -20 lbs from initial consult weight (goal weight 289 lbs or less before surgery) - met        9/12/2023    12:48 PM   --   I have tried the " following methods to lose weight Watching portions or calories    Exercise    Pre packaged meals ex: Nutrisystem    OTC Medications    Prescription Medications         9/12/2023    12:48 PM   Weight Loss Questions Reviewed With Patient   How long have you been overweight? Since late teens through early 20's     MEDICATION FOR WEIGHT LOSS:  Ozempic 1 mg - This dose has been going well, has more interest in eating.     Hx of self harm- swallowing thing- started after she was treated for anorexia when taking topiramate- in 6 months has only had one day of self harm- this was 2 weeks ago and instead of swallowing she inserted something per rectum. - Followed by GI Carli Potter PA-C, PE in 2019 after esophageal perforation repair     VITAMIN/MINERAL SUPPLEMENTS:  Iron, Vitamin B12, Vitamin D     NUTRITION HISTORY:  Food allergies:NKFA  Food intolerances: None  Previous experience with diet modification for weight loss: Nutrisystem, prescription medications, exercise, watching calories or portions     Has been meal planning for the past 3 months. Likes chicken, turkey, shrimp.      October 2023: Eating 3 meals per day. Lunch and dinner meals have been chili or mediterranean orzo salad with artichokes more recently. Drinks mostly water, Cup of coffee, [employer]. Doesn't drink soda or juice. Uses a walker for neuropathy. Walking around more often/standing longer which has been an improvement.     November 2023: Reports she has been sick a lot recently. Sometimes feels she has to force herself to eat. Currently has a cold. Working with a GI doctor right now also as she has been having some GI symptoms after eating certain foods. Reports she had another self harm episode unfortunately, feels this will delay her chances for surgery.     January 2024: Had been dealing with illness recently which set her back a little bit on her goals. Has been using Wegovy. Feels since starting the Wegovy her face gets puffy or red  blotches on it, plans to keep a close eye on it before deciding to stop. Had been eating very well but finding more temptations now. Feels she is not cooking as often and doing more frozen meals.  Hasn't been using walker for a few weeks.     April 2024: Since visiting Sharon nutrition has been off and on since she was in the hospital and multiple trips to the ED. Currently being treated for pneumonia, PE, depression and anxiety. Reports she continues to have constipation/diarrhea on and off. Has Miralax as needed. Also has enema prescription also. Nutrition has been very challenging over the last month due to being in the hospital for 15 days. She did continue to do her Wegovy injections while in the hospital. Plan is to focus on getting healthy and feeling better then will work on her good eating habits again.      June 2024: Things have been going a lot better for patient since last visit. She switched from Wegovy to Ozempic over the last month. Eating 2-3 meals a day. Bowel habits have improved since last RD visit. Physical activity has gotten a lot better with the weight loss. Does not use walker anymore. Walking around a lot. Also spoke with a diabetes educator in May.     August 2024: Psychologist recommended patient not have surgery. Patient is ok with this and is happy with her weight loss on AOMs.     Last few months have been harder due to health conditions she has been dealing with and some stress around her guardianship and housing. She is trying her best to eat healthfully but hasn't found much interest in cooking. If her housing situation changes she is going to look into seeing if she can get Mom's Meals. Trying to increase her fluid intake. Will have constipation at times but feels this has gotten a little better since her last trip to the ED.     Physical activity - will walk instead of using the scooter at stores.         9/12/2023    12:48 PM   Recall Diet Questions Reviewed With Patient    Describe what you typically consume for breakfast (typical or most recent) eggs and hash browns, homemade waffles, laith pudding with fruit   Describe what you typically consume for lunch (typical or most recent) homemade lunchables, some pasta or chicken   Describe what you typically consume for supper (typical or most recent) low calorie meal prep meals   Describe what you typically consume as snacks (typical or most recent) cheese sticks, fruit jerky, fruits/vegetables   How many ounces of water, or other low calorie drinks, do you drink daily (8 oz=1 glass)? 48 oz   How many ounces of caffeine (coffee, tea, pop) do you drink daily (8 oz=1 glass)? 16 oz   How many ounces of carbonated (pop, beer, sparkling water) drinks do you drinky daily (8 oz=1 glass)? 0 oz   How many ounces of juice, pop, sweet tea, sports drinks, protein drinks, other sweetened drinks, do you drink daily (8 oz=1 glass)? 0 oz   How many ounces of milk do you drink daily (8 oz=1 glass) 0 oz   How often do you drink alcohol? Never           9/12/2023    12:48 PM   Eating Habits   What foods do you crave? ice cream, chocolate, sometimes just salty chips           9/12/2023    12:48 PM   Dining Out History Reviewed With Patient   How often do you dine out? Rarely.   Where do you dine out? (select all that apply) take out   What types of food do you order when you dine out? jitendra verdin     EXERCISE:        7/20/2024     9:33 AM   --   How often do you exercise? 1 to 2 times per week   What is the duration of your exercise (in minutes)? 15 Minutes   What types of exercise do you do? walking   What keeps you from being more active? Pain     NUTRITION DIAGNOSIS:  Obesity r/t long history of positive energy balance aeb BMI >30 kg/m2.    INTERVENTION:  Intervention Provided/Education Provided on post-op diet guidelines, vitamins/minerals essential post-operatively, GI anatomy of bariatric surgeries, ways to help prepare for post-op diet guidelines  pre-operatively, portion/calorie-control, mindful eating and sources of protein.  Patient demonstrates understanding.     Personal barriers to making and continuing required life changes have been identified, and strategies to overcome those barriers have been recommended AND family and social supports have been assessed and strategies to strengthen those supports have been recommended.    Provided pt with list of goals, RD contact information and resources listed below via [employer].           9/12/2023    12:48 PM   Questions Reviewed With Patient   How ready are you to make changes regarding your weight? Number 1 = Not ready at all to make changes up to 10 = very ready. 10   How confident are you that you can change? 1 = Not confident that you will be successful making changes up to 10 = very confident. 7     Expected Engagement: good    GOALS:  1) Continue to eat 3 small meals daily  2) Have a good protein source at all meals  3) Keep up with hydration, aim for 64 oz of fluid daily.     Frozen Meal Ideas:   Lean Cuisine   Green Giant Castle Rock Protein Bowls   Frontera Chicken & Chorizo Taco Bowl  Healthy Choice Simply Steamers  Garden Lites Veggies Made Great  Healthy Choice Power Bowls  Smart Ones  Rodri Helm Delights  Life Cuisine  Victor Cake Brand    Follow Up: as needed.     Time spent with patient: 27 minutes.  Nevin Birmingham, SIDNEY, LD

## 2024-08-22 ENCOUNTER — VIRTUAL VISIT (OUTPATIENT)
Dept: ENDOCRINOLOGY | Facility: CLINIC | Age: 34
End: 2024-08-22
Payer: COMMERCIAL

## 2024-08-22 VITALS — BODY MASS INDEX: 45.08 KG/M2 | HEIGHT: 62 IN | WEIGHT: 245 LBS

## 2024-08-22 DIAGNOSIS — E11.9 TYPE 2 DIABETES MELLITUS WITHOUT COMPLICATION, WITHOUT LONG-TERM CURRENT USE OF INSULIN (H): ICD-10-CM

## 2024-08-22 DIAGNOSIS — E66.9 OBESITY: ICD-10-CM

## 2024-08-22 DIAGNOSIS — Z71.3 NUTRITIONAL COUNSELING: Primary | ICD-10-CM

## 2024-08-22 PROCEDURE — 99207 PR NO CHARGE LOS: CPT | Mod: 95

## 2024-08-22 PROCEDURE — 97803 MED NUTRITION INDIV SUBSEQ: CPT | Mod: 95

## 2024-08-22 ASSESSMENT — PAIN SCALES - GENERAL: PAINLEVEL: NO PAIN (0)

## 2024-08-22 NOTE — PATIENT INSTRUCTIONS
Jay Rooney,     Follow-up with RD as needed.     Thank you,    Nevin Birmingham, SIDNEY, LD  If you would like to schedule or reschedule an appointment with the RD, please call [phone]    Nutrition Goals  1) Continue to eat 3 small meals daily  2) Have a good protein source at all meals  3) Keep up with hydration, aim for 64 oz of fluid daily.     Frozen Meal Ideas:   Lean Cuisine   Green Giant [place] Protein Bowls   Frontera Chicken & Chorizo Taco Bowl  Healthy Choice Simply Steamers  Garden Lites Veggies Made Great  Healthy Choice Power Bowls  Smart Ones  Rodri Volodymyr Delights  Life Cuisine  [place] Cake Brand    COMPREHENSIVE WEIGHT MANAGEMENT PROGRAM  VIRTUAL SUPPORT GROUPS    At [facility], our Comprehensive Weight Management program offers on-line support groups for patients who are working on weight loss and considering, preparing for, or have had weight loss surgery.     There is no cost for this opportunity.  You are invited to attend the?Virtual Support Groups?provided by any of the following locations:    [facility] via Microsoft Teams with Roxanna Alonso RN  2.   [place] via [facility] [place] with Bird Franz, PhD, LP  3.   [place] via [facility] [place] with Margie Stock RN  4.   [facility] via a Zoom Meeting with RUBÉN San    The following Support Group information can also be found on our website:  [email or web address]      [facility] Weight Loss Surgery Support Group  The support group is a patient-lead forum that meets monthly to share experiences, encouragement and education. It is open to those who have had weight loss surgery, are scheduled for surgery, or are considering surgery.   WHEN: This group meets on the 3rd Wednesday of each month from 5:00PM - 6:00PM virtually using Microsoft Teams.   FACILITATOR: Led by Roxanna Greenberg RD, BLADE, RN, the program's Clinical Coordinator.  "  TO REGISTER: Please contact the clinic via [employer] or call the nurse line directly at [phone] to inform our staff that you would like an invite sent to you and to let us know the email you would like the invite sent to. Prior to the meeting, a link with directions on how to join the meeting will be sent to you.    2024 Meetings   January 17  February 21  March 20  April 17  May 15  Candice 19      [facility] Bariatric Care Support Group?  This is open to all pre- and post- operative bariatric surgery patients as well as their support system.   WHEN: This group meets the 3rd Tuesday of each month from 6:30 PM - 8:00 PM virtually using Microsoft Teams.   FACILITATOR: Led by Bird Franz, Ph.D who is a Licensed Psychologist with the [facility] Comprehensive Weight Management Program.   TO REGISTER: Please send an email to Bird Franz, Ph.D., LP [email or web address]?if you would like an invitation to the group. Prior to the meeting, a link with directions on how to join the meeting will be sent to you.    2024 Meetings January 16: \"Medication Management and Bariatric Surgery\", Gayle Oconnell, PharmD, Pharmacy Resident at [facility]  February 20: \"A Bariatric Surgery Patient's Perspective\", MYLES Keys, [facility], Behavioral Health Clinician at [facility]  March 19  April 16  May 21  Candice 18: \"Nutritional Labeling\", Dietitian speaker to be announced.  November 19: \"Holiday Eating\", Dietitian speaker to be announced.    [facility] Post-Operative Bariatric Surgery Support Group  This is a support group for [facility] bariatric patients (and those external to [facility]) who have had bariatric surgery and are at least 3 months post-surgery.  WHEN: This support group meets the 4th Wednesday of the month from 11:00 AM - 12:00 " "PM virtually using Microsoft Teams.   FACILITATOR: Led by Certified Bariatric Nurse, Margie Stock RN.   TO REGISTER: Please send an email to Margie at [email or web address] if you would like an invitation to the group.  Prior to the meeting, a link with directions on how to join the meeting will be sent to you.    2024 Meetings  January 24  February 28  March 27  April 24  May 22  Candice 26    [facility] Healthy Lifestyle Group?  This is a 60 minute virtual coaching group for those who want to lead a healthier lifestyle. Come together to set goals and overcome barriers in a supportive group environment. We will address the four pillars of health: nutrition, exercise, sleep and emotional well-being.  This group is highly recommended for those who are participating in the 24 week Healthy Lifestyle Plan and our Health Coaching sessions.  WHEN: This group meets the 1st Friday of the month, 12:30 PM - 1:30 PM online, via a zoom meeting.    FACILITATOR: Led by National Board Certified Health and , Margie Yan [facility].   TO REGISTER: Please call the Call Center at [phone] to register. You will get an appointment to attend in [place]. Fifteen minutes prior to the meeting, complete the e-check in and you will get the link to join the meeting.    There is no charge to attend this group and space is limited.     2024 Meetings  January 5: \"New Years Vision: Manifest your Best 2024!\" (guided imagery,  journaling and discussion)  February 2: \"Let's Talk\"  March 1: \"10 Percent Happier\" by Jovanni Koenig (Book Bites - a guided discussion on the nuggets of wisdom from favorite wellness books, no need to read the book but highly encouraged)  April 5: \"Let's Talk\"  May 3: \"Essentialism: The Disciplined Pursuit of Less\" by Varinder Franz (Book Bites discussion)  June 7: \"Let's Talk\"  July 5: NO MEETING, off for the 4th of July Holiday  August 2: \"The Blue Zones, Secrets for " "Living a Longer Life\" by Jovanni Jim (Book Bites discussion)        "

## 2024-08-22 NOTE — NURSING NOTE
Current patient location: [address] [facility] [address]    Is the patient currently in the state of [place]? YES    Visit mode:VIDEO    If the visit is dropped, the patient can be reconnected by: VIDEO VISIT: Text to cell phone:   Telephone Information:   Mobile [phone]       Will anyone else be joining the visit? NO  (If patient encounters technical issues they should call [phone]    How would you like to obtain your AVS? MyChart    Are changes needed to the allergy or medication list? N/A    Are refills needed on medications prescribed by this physician? NO    Rooming Documentation:  Not applicable      Reason for visit: RECHECK    Yuniel ANTONIO

## 2024-08-22 NOTE — LETTER
"8/22/2024       RE: Nevin Alvarado  [address]  [facility] [address]     Dear Colleague,    Thank you for referring your patient, Nevin Alvarado, to the [facility] [place] at [facility]. Please see a copy of my visit note below.    Video-Visit Details    Type of service:  Video Visit    Video Start Time: 8:01 AM  Video End Time: 8:28 AM     Originating Location (pt. Location): Home    Distant Location (provider location): Offsite (providers home) [facility] [place]     Platform used for Video Visit: AmWell    Return Bariatric Nutrition Consultation Note    Reason For Visit: Nutrition Assessment    Nevin Alvarado is a 33 year old presenting today for return bariatric nutrition consult.  Currently working on medical weight management. Patient is accompanied by self.  This is pt's 7th nutrition visit.    Pt referred by Sharon Toro NP on September 12, 2023.  CO-MORBIDITIES OF OBESITY INCLUDE:        9/12/2023    12:48 PM   --   I have the following health issues associated with obesity Type II Diabetes    High Blood Pressure    Sleep Apnea    Polycystic Ovarian Syndrome    GERD (Reflux)    Fatty Liver    Asthma    Stress Incontinence     SUPPORT:      9/12/2023    12:48 PM   Support System Reviewed With Patient   Who do you have in your support network that can be available to help you for the first 2 weeks after surgery? I live in a group home with 24 hour staff, mom   Who can you count on for support throughout your weight loss surgery journey? a friend, and my therapist     ANTHROPOMETRICS:  Initial Consult Weight: 309 lb on 9/13/23  Estimated body mass index is 44.81 kg/m  as calculated from the following:    Height as of this encounter: 1.575 m (5' 2\").    Weight as of this encounter: 111.1 kg (245 lb).  Current Weight: 245 lb per pt report     Wt Readings from " Last 5 Encounters:   08/22/24 111.1 kg (245 lb)   08/11/24 111.1 kg (245 lb)   08/04/24 111.1 kg (245 lb)   08/02/24 111.1 kg (245 lb)   07/25/24 110.7 kg (244 lb)     Required weight loss goal pre-op: -20 lbs from initial consult weight (goal weight 289 lbs or less before surgery) - met        9/12/2023    12:48 PM   --   I have tried the following methods to lose weight Watching portions or calories    Exercise    Pre packaged meals ex: Nutrisystem    OTC Medications    Prescription Medications         9/12/2023    12:48 PM   Weight Loss Questions Reviewed With Patient   How long have you been overweight? Since late teens through early 20's     MEDICATION FOR WEIGHT LOSS:  Ozempic 1 mg - This dose has been going well, has more interest in eating.     Hx of self harm- swallowing thing- started after she was treated for anorexia when taking topiramate- in 6 months has only had one day of self harm- this was 2 weeks ago and instead of swallowing she inserted something per rectum. - Followed by MASON Potter PA-C, PE in 2019 after esophageal perforation repair     VITAMIN/MINERAL SUPPLEMENTS:  Iron, Vitamin B12, Vitamin D     NUTRITION HISTORY:  Food allergies:NKFA  Food intolerances: None  Previous experience with diet modification for weight loss: Nutrisystem, prescription medications, exercise, watching calories or portions     Has been meal planning for the past 3 months. Likes chicken, turkey, shrimp.      October 2023: Eating 3 meals per day. Lunch and dinner meals have been chili or mediterranean orzo salad with artichokes more recently. Drinks mostly water, Cup of coffee, [employer]. Doesn't drink soda or juice. Uses a walker for neuropathy. Walking around more often/standing longer which has been an improvement.     November 2023: Reports she has been sick a lot recently. Sometimes feels she has to force herself to eat. Currently has a cold. Working with a GI doctor right now also as she has been  having some GI symptoms after eating certain foods. Reports she had another self harm episode unfortunately, feels this will delay her chances for surgery.     January 2024: Had been dealing with illness recently which set her back a little bit on her goals. Has been using Wegovy. Feels since starting the Wegovy her face gets puffy or red blotches on it, plans to keep a close eye on it before deciding to stop. Had been eating very well but finding more temptations now. Feels she is not cooking as often and doing more frozen meals.  Hasn't been using walker for a few weeks.     April 2024: Since visiting Sharon nutrition has been off and on since she was in the hospital and multiple trips to the ED. Currently being treated for pneumonia, PE, depression and anxiety. Reports she continues to have constipation/diarrhea on and off. Has Miralax as needed. Also has enema prescription also. Nutrition has been very challenging over the last month due to being in the hospital for 15 days. She did continue to do her Wegovy injections while in the hospital. Plan is to focus on getting healthy and feeling better then will work on her good eating habits again.      June 2024: Things have been going a lot better for patient since last visit. She switched from Wegovy to Ozempic over the last month. Eating 2-3 meals a day. Bowel habits have improved since last RD visit. Physical activity has gotten a lot better with the weight loss. Does not use walker anymore. Walking around a lot. Also spoke with a diabetes educator in May.     August 2024: Psychologist recommended patient not have surgery. Patient is ok with this and is happy with her weight loss on AOMs.     Last few months have been harder due to health conditions she has been dealing with and some stress around her guardianship and housing. She is trying her best to eat healthfully but hasn't found much interest in cooking. If her housing situation changes she is going to look  into seeing if she can get Mom's Meals. Trying to increase her fluid intake. Will have constipation at times but feels this has gotten a little better since her last trip to the ED.     Physical activity - will walk instead of using the scooter at stores.         9/12/2023    12:48 PM   Recall Diet Questions Reviewed With Patient   Describe what you typically consume for breakfast (typical or most recent) eggs and hash browns, homemade waffles, laith pudding with fruit   Describe what you typically consume for lunch (typical or most recent) homemade lunchables, some pasta or chicken   Describe what you typically consume for supper (typical or most recent) low calorie meal prep meals   Describe what you typically consume as snacks (typical or most recent) cheese sticks, fruit jerky, fruits/vegetables   How many ounces of water, or other low calorie drinks, do you drink daily (8 oz=1 glass)? 48 oz   How many ounces of caffeine (coffee, tea, pop) do you drink daily (8 oz=1 glass)? 16 oz   How many ounces of carbonated (pop, beer, sparkling water) drinks do you drinky daily (8 oz=1 glass)? 0 oz   How many ounces of juice, pop, sweet tea, sports drinks, protein drinks, other sweetened drinks, do you drink daily (8 oz=1 glass)? 0 oz   How many ounces of milk do you drink daily (8 oz=1 glass) 0 oz   How often do you drink alcohol? Never           9/12/2023    12:48 PM   Eating Habits   What foods do you crave? ice cream, chocolate, sometimes just salty chips           9/12/2023    12:48 PM   Dining Out History Reviewed With Patient   How often do you dine out? Rarely.   Where do you dine out? (select all that apply) take out   What types of food do you order when you dine out? jitendra verdin     EXERCISE:        7/20/2024     9:33 AM   --   How often do you exercise? 1 to 2 times per week   What is the duration of your exercise (in minutes)? 15 Minutes   What types of exercise do you do? walking   What keeps you from being  more active? Pain     NUTRITION DIAGNOSIS:  Obesity r/t long history of positive energy balance aeb BMI >30 kg/m2.    INTERVENTION:  Intervention Provided/Education Provided on post-op diet guidelines, vitamins/minerals essential post-operatively, GI anatomy of bariatric surgeries, ways to help prepare for post-op diet guidelines pre-operatively, portion/calorie-control, mindful eating and sources of protein.  Patient demonstrates understanding.     Personal barriers to making and continuing required life changes have been identified, and strategies to overcome those barriers have been recommended AND family and social supports have been assessed and strategies to strengthen those supports have been recommended.    Provided pt with list of goals, RD contact information and resources listed below via [employer].           9/12/2023    12:48 PM   Questions Reviewed With Patient   How ready are you to make changes regarding your weight? Number 1 = Not ready at all to make changes up to 10 = very ready. 10   How confident are you that you can change? 1 = Not confident that you will be successful making changes up to 10 = very confident. 7     Expected Engagement: good    GOALS:  1) Continue to eat 3 small meals daily  2) Have a good protein source at all meals  3) Keep up with hydration, aim for 64 oz of fluid daily.     Frozen Meal Ideas:   Lean Cuisine   Green Giant [place] Protein Bowls   [place] Chicken & Chorizo Taco Bowl  Healthy Choice Simply Steamers  Garden Lites Veggies Made Great  Healthy Choice Power Bowls  Smart Ones  Rodri [place] Delights  Life Cuisine  [place] Cake Brand    Follow Up: as needed.     Time spent with patient: 27 minutes.  Nevin Birmingham RD, LD        Again, thank you for allowing me to participate in the care of your patient.      Sincerely,    Nevin Birmingham RD

## 2024-08-23 ENCOUNTER — HOSPITAL ENCOUNTER (EMERGENCY)
Facility: CLINIC | Age: 34
Discharge: HOME | End: 2024-08-23
Attending: EMERGENCY MEDICINE | Admitting: EMERGENCY MEDICINE
Payer: COMMERCIAL

## 2024-08-23 ENCOUNTER — PRE VISIT (OUTPATIENT)
Dept: SURGERY | Facility: CLINIC | Age: 34
End: 2024-08-23

## 2024-08-23 ENCOUNTER — HOSPITAL ENCOUNTER (EMERGENCY)
Facility: CLINIC | Age: 34
Discharge: HOME | End: 2024-08-24
Attending: EMERGENCY MEDICINE | Admitting: EMERGENCY MEDICINE
Payer: COMMERCIAL

## 2024-08-23 VITALS
HEART RATE: 93 BPM | WEIGHT: 241 LBS | DIASTOLIC BLOOD PRESSURE: 87 MMHG | BODY MASS INDEX: 44.35 KG/M2 | HEIGHT: 62 IN | RESPIRATION RATE: 20 BRPM | TEMPERATURE: 98.5 F | OXYGEN SATURATION: 97 % | SYSTOLIC BLOOD PRESSURE: 141 MMHG

## 2024-08-23 VITALS
WEIGHT: 244.05 LBS | HEART RATE: 92 BPM | HEIGHT: 62 IN | OXYGEN SATURATION: 99 % | RESPIRATION RATE: 16 BRPM | SYSTOLIC BLOOD PRESSURE: 165 MMHG | BODY MASS INDEX: 44.91 KG/M2 | DIASTOLIC BLOOD PRESSURE: 111 MMHG | TEMPERATURE: 98.8 F

## 2024-08-23 DIAGNOSIS — T14.8XXA HEMATOMA: ICD-10-CM

## 2024-08-23 DIAGNOSIS — K08.89 PAIN, DENTAL: ICD-10-CM

## 2024-08-23 LAB
HGB BLD-MCNC: 13.9 G/DL (ref 11.7–15.7)
INR PPP: 1.36 (ref 0.85–1.15)

## 2024-08-23 PROCEDURE — 85018 HEMOGLOBIN: CPT | Performed by: EMERGENCY MEDICINE

## 2024-08-23 PROCEDURE — 99283 EMERGENCY DEPT VISIT LOW MDM: CPT

## 2024-08-23 PROCEDURE — 250N000011 HC RX IP 250 OP 636: Performed by: EMERGENCY MEDICINE

## 2024-08-23 PROCEDURE — 96376 TX/PRO/DX INJ SAME DRUG ADON: CPT

## 2024-08-23 PROCEDURE — 250N000013 HC RX MED GY IP 250 OP 250 PS 637: Performed by: EMERGENCY MEDICINE

## 2024-08-23 PROCEDURE — 99284 EMERGENCY DEPT VISIT MOD MDM: CPT | Mod: 25

## 2024-08-23 PROCEDURE — 64400 NJX AA&/STRD TRIGEMINAL NRV: CPT

## 2024-08-23 PROCEDURE — 99282 EMERGENCY DEPT VISIT SF MDM: CPT

## 2024-08-23 PROCEDURE — 96374 THER/PROPH/DIAG INJ IV PUSH: CPT

## 2024-08-23 PROCEDURE — 85610 PROTHROMBIN TIME: CPT | Performed by: EMERGENCY MEDICINE

## 2024-08-23 PROCEDURE — 36415 COLL VENOUS BLD VENIPUNCTURE: CPT | Performed by: EMERGENCY MEDICINE

## 2024-08-23 RX ORDER — ACETAMINOPHEN 500 MG
1000 TABLET ORAL EVERY 6 HOURS PRN
Qty: 60 TABLET | Refills: 0 | Status: SHIPPED | OUTPATIENT
Start: 2024-08-23 | End: 2024-09-22

## 2024-08-23 RX ORDER — HYDROMORPHONE HYDROCHLORIDE 1 MG/ML
0.5 INJECTION, SOLUTION INTRAMUSCULAR; INTRAVENOUS; SUBCUTANEOUS ONCE
Status: COMPLETED | OUTPATIENT
Start: 2024-08-23 | End: 2024-08-23

## 2024-08-23 RX ORDER — HYDROMORPHONE HYDROCHLORIDE 2 MG/1
2 TABLET ORAL ONCE
Status: COMPLETED | OUTPATIENT
Start: 2024-08-23 | End: 2024-08-23

## 2024-08-23 RX ORDER — TRANEXAMIC ACID 100 MG/ML
INJECTION, SOLUTION INTRAVENOUS ONCE
Status: DISCONTINUED | OUTPATIENT
Start: 2024-08-23 | End: 2024-08-24 | Stop reason: HOSPADM

## 2024-08-23 RX ADMIN — HYDROMORPHONE HYDROCHLORIDE 2 MG: 2 TABLET ORAL at 10:51

## 2024-08-23 RX ADMIN — HYDROMORPHONE HYDROCHLORIDE 0.5 MG: 1 INJECTION, SOLUTION INTRAMUSCULAR; INTRAVENOUS; SUBCUTANEOUS at 22:28

## 2024-08-23 RX ADMIN — HYDROMORPHONE HYDROCHLORIDE 0.5 MG: 1 INJECTION, SOLUTION INTRAMUSCULAR; INTRAVENOUS; SUBCUTANEOUS at 23:16

## 2024-08-23 ASSESSMENT — COLUMBIA-SUICIDE SEVERITY RATING SCALE - C-SSRS
6. HAVE YOU EVER DONE ANYTHING, STARTED TO DO ANYTHING, OR PREPARED TO DO ANYTHING TO END YOUR LIFE?: NO
1. IN THE PAST MONTH, HAVE YOU WISHED YOU WERE DEAD OR WISHED YOU COULD GO TO SLEEP AND NOT WAKE UP?: NO
2. HAVE YOU ACTUALLY HAD ANY THOUGHTS OF KILLING YOURSELF IN THE PAST MONTH?: NO
2. HAVE YOU ACTUALLY HAD ANY THOUGHTS OF KILLING YOURSELF IN THE PAST MONTH?: NO
1. IN THE PAST MONTH, HAVE YOU WISHED YOU WERE DEAD OR WISHED YOU COULD GO TO SLEEP AND NOT WAKE UP?: NO
6. HAVE YOU EVER DONE ANYTHING, STARTED TO DO ANYTHING, OR PREPARED TO DO ANYTHING TO END YOUR LIFE?: NO

## 2024-08-23 ASSESSMENT — ACTIVITIES OF DAILY LIVING (ADL)
ADLS_ACUITY_SCORE: 43

## 2024-08-23 NOTE — ED PROVIDER NOTES
Emergency Department Note      History of Present Illness     Chief Complaint   Dental Pain      HPI   Nevin Alvarado is a 33 year old female on anticoagulation who presents to the ED with dental pain. The patient reports she had teeth pulled four days ago at [facility] in [place] and she had a follow-up appointment after and everything looked normal. She reports she was put on azithromycin which she has finished and was prescribed tramadol for pain. She states last night she began feeling dental pain and took tramadol at 1830 and then took tylenol around 1930. She reports she went to bed and woke up at 0330 with excruciating pain and took another tramadol which has not helped. She states her pain is more than a 10/10 severity currently. She reports she went to urgent care and was given decadron and clindamycin and the doctor suspected infection and they recommended her to come to the ED. She states she believes the stiches are torn out and she can feel it resting on her tongue. She endorses pain the is radiating to her right sinus and down her right neck to her right chest. She also endorses trouble swallowing, trouble opening mouth, trouble keeping eyes open, and headache.     Independent Historian   None    Review of External Notes   Reviewed urgency room visit from prior to arrival. Pt was recommended to follow-up with her dentist.     Past Medical History     Medical History and Problem List   ADD  Anorexia nervosa with bulimia   Anxiety  Asthma  Borderline personality disorder  Depression  Depressive disorder  Diabetes  Morbid obesity  Neuropathy  Obesity  PTSD  Pulmonary emboli  Rectal foreign body  Self-injurious behavior  Sleep apnea  Suicidal overdose   Swallowed foreign body  Syncope    Medications   Albuterol  Eliquis  Benzonatate   Cetirizine   Clonazepam  Cyclobenzaprine   Dexamethasone  Dicyclomine   Escitalopram   Haloperidol   Hydroxyzine  Insulin  "  Montelukast   Norethindrone   Ondansetron   Pantoprazole   Polyethylene glycol  Valacyclovir    Surgical History   Abdomen surgery x 2  Egd x 70+  Remove fecal impaction under anesthesia  Ir cvc tunnel placement x 2  Ir lumbar puncture  Knee surgery  Laparoscopic ablation endometriosis  Laparotomy exploratory x 2  lymph node removal  Mammoplasty reduction  PICC double lumen placement  Pr surg diagnostic exam, anorectal  Release carpal tunnel x 2  Sigmoidoscopy flexible x 3    Physical Exam     Patient Vitals for the past 24 hrs:   BP Temp Temp src Pulse Resp SpO2 Height Weight   08/23/24 1057 -- -- -- -- -- 99 % -- --   08/23/24 1056 -- -- -- -- -- 98 % -- --   08/23/24 1055 -- -- -- -- -- 98 % -- --   08/23/24 1054 -- -- -- -- -- 99 % -- --   08/23/24 1053 -- -- -- -- -- 99 % -- --   08/23/24 1052 -- -- -- -- -- 96 % -- --   08/23/24 1051 -- -- -- -- -- 97 % -- --   08/23/24 1050 -- -- -- -- -- 99 % -- --   08/23/24 1007 (!) 165/111 98.8  F (37.1  C) Temporal 92 22 96 % 1.575 m (5' 2\") 110.7 kg (244 lb 0.8 oz)     Physical Exam    Head:  The scalp, face, and head appear normal  Eyes:  Conjunctivae are normal  ENT:    No trismus. Clot noted at recent extraction site in lower right posterior jaw. No active bleeding. No abscess.   Neck:  Trachea midline  CV:  Normal rate  Resp:  No respiratory distress   Musc:  Normal muscular tone  Skin:  No rash or lesions noted  Neuro: Speech is normal and fluent. Face is symmetric. Moving all extremities well.             Diagnostics     Lab Results   Labs Ordered and Resulted from Time of ED Arrival to Time of ED Departure - No data to display    Imaging   No orders to display       Independent Interpretation   None    ED Course      Medications Administered   Medications   HYDROmorphone (DILAUDID) tablet 2 mg (2 mg Oral $Given 8/23/24 1051)       Discussion of Management   4035 I spoke with Dr Benítez, on call for pt's dental surgeon, regarding the patient.    ED Course   ED " Course as of 08/25/24 1914   Fri Aug 23, 2024   1033 I obtained history and examined the patient as noted above.   1127 I rechecked the patient and explained findings.       Additional Documentation  None    Medical Decision Making / Diagnosis     CMS Diagnoses: None    MIPS       None    MDM   Nevin Alvarado is a 33 year old female who presents with CC dental pain several days after dental extraction. She reports her greatest concern is facial swelling and bleeding. On exam, she has evidence of recent extraction without concerning swelling or evidence of deep space infection. I discussed with her on call oral surgeon who stated his office sent in rx for steroids & po dilaudid. Pt was also started on clindamycin earlier today. Pt also was reporting concern for allergic reaction given a red lesion on her leg, however this was clearly an abrasion with visible scratch marks and bruising. Reassured pt that she has had clindamycin many times in the past. She is in stable condition at the time of discharge, red flags that should merit ED return were discussed as well as recommended follow-up instructions. All questions were answered.      Disposition   The patient was discharged.     Diagnosis     ICD-10-CM    1. Pain, dental  K08.89            Discharge Medications   New Prescriptions    ACETAMINOPHEN (TYLENOL) 500 MG TABLET    Take 2 tablets (1,000 mg) by mouth every 6 hours as needed for pain or fever.         Scribe Disclosure:  I, Sylvester Ruiz, am serving as a scribe at 10:39 AM on 8/23/2024 to document services personally performed by Ilya David MD based on my observations and the provider's statements to me.        Ilya David MD  08/25/24 1919

## 2024-08-23 NOTE — DISCHARGE INSTRUCTIONS
Continue tylenol 1,000mg every 6 hours as needed for pain. Continue tramadol as well.   Follow-up with your oral surgeon regarding worsening pain & swelling.     Discharge Instructions  Dental Pain    You have been seen today for a toothache. Your pain may be caused by an exposed nerve, an infection (pulpitis), a root abscess (pocket of pus), or other problems. You will need to see a dentist for a solution to your tooth problem. Emergency Department care is only to help control your problem until you can see a dentist; we cannot provide complete dental care.  Today, we did not find any sign that your toothache was caused by any dangerous or life-threatening condition, but sometimes symptoms develop over time and cannot be found during an emergency visit, so it is very important that you follow up with your dentist.      Generally, every Emergency Department visit should have a follow-up clinic visit with either a primary or a specialty clinic/provider. Please follow-up as instructed by your emergency provider today.    Return to the Emergency Department if:  You develop a new fever over 100.4 F.  You cannot open your mouth normally, cannot move your tongue well, or cannot swallow.  You have new or increased swelling of your face or neck.  You develop drainage of pus or foul smelling material from around your tooth.  What can I do to help myself?  Take any antibiotic the provider may have prescribed for you today.  Avoid very hot or very cold foods as both can cause pain.  Make an appointment to see a dentist as soon as possible. Dentists are generally not  on-staff  at hospitals so we cannot  refer  to you to dentist but we may be able to provide a list of dental clinics to help you.  If you were given a prescription for medicine here today, be sure to read all of the information (including the package insert) that comes with your prescription.  This will include important information about the medicine, its side  effects, and any warnings that you need to know about.  The pharmacist who fills the prescription can provide more information and answer questions you may have about the medicine.  If you have questions or concerns that the pharmacist cannot address, please call or return to the Emergency Department.   Remember that you can always come back to the Emergency Department if you are not able to see your regular provider in the amount of time listed above, if you get any new symptoms, or if there is anything that worries you.

## 2024-08-23 NOTE — ED TRIAGE NOTES
Had dental procedure Monday. Went to follow up yesterday and was told it was healing jose Woke up with pain and pocket in gums that is growing. R side of face. Was told by  to come here. On blood thinners, gum is bleeding.

## 2024-08-24 NOTE — ED TRIAGE NOTES
Pt arrives to ed via ems with c/o of mouth bleeding where right 2 molars got pulled on Monday. Pt took 4mg odt Zofran at home but still feeling nauseous and Is having pain.      Triage Assessment (Adult)       Row Name 08/23/24 2049          Triage Assessment    Airway WDL WDL        Respiratory WDL    Respiratory WDL WDL        Cardiac WDL    Cardiac WDL WDL        Cognitive/Neuro/Behavioral WDL    Cognitive/Neuro/Behavioral WDL X

## 2024-08-24 NOTE — DISCHARGE INSTRUCTIONS
You are seen and evaluated here in the emergency department for your dental pain and bleeding from the surgical site where you had your teeth pulled.  Fortunately, we were able to get this clot out, improved pain as well as stop significant bleeding.  Recommend taking Tylenol 1000 mg every 6 hours.  In regards to the tramadol and Dilaudid you have, please do not take these together.  Either take the tramadol 50 mg every 4-6 hours or the Dilaudid every 4-6 hours.  As you have not had Dilaudid 4 mg before I commend taking 2 mg orally to start, you can take 2 to 4 mg every 4-6 hours.    Do not drink from straws or do anything that will increase the pressure inside your mouth as this can cause it to rebleed.  To only soft foods, nothing hard or anything that you need to chew on the area as it can cause rebleeding.    Previously prescribed oral antibiotics and I recommend taking these as well as the Zofran as needed for nausea.    Please call your oral surgery clinic on Monday to get an appointment scheduled.    If you develop significant uncontrolled pain, bleeding or other concerns please return to the emergency department.  As we do not have oral surgery here, if you have significant concerns would recommend going to the [facility].

## 2024-08-24 NOTE — ED PROVIDER NOTES
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 ESOPHAGOGASTRODUODENOSCOPY TRANSORAL DIAGNOSTIC N/A 1/31/2019    Procedure: ESOPHAGOGASTRODUODENOSCOPY (EGD);  Surgeon: Siddharth Spears MD;  Location: [facility] OR;  Service: Gastroenterology    KS ESOPHAGOGASTRODUODENOSCOPY TRANSORAL DIAGNOSTIC N/A 8/17/2019    Procedure:  ESOPHAGOGASTRODUODENOSCOPY (EGD) with foreign body removal;  Surgeon: Darek Lucero MD;  Location: [facility];  Service: Gastroenterology    KS ESOPHAGOGASTRODUODENOSCOPY TRANSORAL DIAGNOSTIC N/A 9/29/2019    Procedure: ESOPHAGOGASTRODUODENOSCOPY (EGD) with foreign body removal;  Surgeon: Bailey Arnold MD;  Location: [facility];  Service: Gastroenterology    KS ESOPHAGOGASTRODUODENOSCOPY TRANSORAL DIAGNOSTIC N/A 10/3/2019    Procedure: ESOPHAGOGASTRODUODENOSCOPY (EGD), REMOVAL OF FOREIGN BODY;  Surgeon: Chris Lira MD;  Location: [facility] OR;  Service: Gastroenterology    KS ESOPHAGOGASTRODUODENOSCOPY TRANSORAL DIAGNOSTIC N/A 10/6/2019    Procedure: ESOPHAGOGASTRODUODENOSCOPY (EGD) with attempted foreign body removal;  Surgeon: Felipe Connolly MD;  Location: [facility];  Service: Gastroenterology    KS ESOPHAGOGASTRODUODENOSCOPY TRANSORAL DIAGNOSTIC N/A 12/15/2019    Procedure: ESOPHAGOGASTRODUODENOSCOPY (EGD) with foreign body removal;  Surgeon: Jeffy Zuñiga MD;  Location: [facility];  Service: Gastroenterology    KS ESOPHAGOGASTRODUODENOSCOPY TRANSORAL DIAGNOSTIC N/A 12/17/2019    Procedure: ESOPHAGOGASTRODUODENOSCOPY (EGD) with attempted foreign body removal;  Surgeon: Felipe Connolly MD;  Location: [facility];  Service: Gastroenterology    KS ESOPHAGOGASTRODUODENOSCOPY TRANSORAL DIAGNOSTIC N/A 12/21/2019    Procedure: ESOPHAGOGASTRODUODENOSCOPY (EGD) FOR FROEIGN BODY REMOVAL;  Surgeon: Binu Vigil MD;  Location: [facility] OR;  Service: Gastroenterology    KS ESOPHAGOGASTRODUODENOSCOPY TRANSORAL DIAGNOSTIC N/A 7/22/2020    Procedure: ESOPHAGOGASTRODUODENOSCOPY (EGD);  Surgeon: Bailey Arnold MD;  Location: [facility] OR;  Service: Gastroenterology    KS ESOPHAGOGASTRODUODENOSCOPY TRANSORAL DIAGNOSTIC N/A 8/14/2020    Procedure: ESOPHAGOGASTRODUODENOSCOPY (EGD) FOREIGN BODY REMOVAL;  Surgeon: Jeffy Zuñiga MD;   Location: [facility] OR;  Service: Gastroenterology    NV ESOPHAGOGASTRODUODENOSCOPY TRANSORAL DIAGNOSTIC N/A 2/25/2021    Procedure: ESOPHAGOGASTRODUODENOSCOPY (EGD) with foreign body reoval;  Surgeon: Bird Sethi MD;  Location: [facility];  Service: Gastroenterology    NV ESOPHAGOGASTRODUODENOSCOPY TRANSORAL DIAGNOSTIC N/A 4/19/2021    Procedure: ESOPHAGOGASTRODUODENOSCOPY (EGD);  Surgeon: Libia Rose MD;  Location: [facility] OR;  Service: Gastroenterology    NV SURG DIAGNOSTIC EXAM, ANORECTAL N/A 2/5/2020    Procedure: EXAM UNDER ANESTHESIA, Flexible Sigmoidoscopy, Retrieval of Foreign Body;  Surgeon: Sasha Ivan MD;  Location: [facility];  Service: General    RELEASE CARPAL TUNNEL Bilateral     RELEASE CARPAL TUNNEL Bilateral     REMOVAL, FOREIGN BODY, RECTUM N/A 7/21/2021    Procedure: MANUAL RETREIVALOF FOREIGN OBJECT- RECTUM.;  Surgeon: Aleksandra Gerber MD;  Location: [facility]    SIGMOIDOSCOPY FLEXIBLE N/A 1/10/2017    Procedure: SIGMOIDOSCOPY FLEXIBLE;  Surgeon: Annmarie Haynes MD;  Location:  OR    SIGMOIDOSCOPY FLEXIBLE N/A 5/8/2018    Procedure: SIGMOIDOSCOPY FLEXIBLE;  flex sig with foreign body removal using snare and rattooth forcep;  Surgeon: Soham Cano MD;  Location: [facility]    SIGMOIDOSCOPY FLEXIBLE N/A 2/20/2019    Procedure: Exam under anesthesia Colonoscopy with attempted  removal of rectal foreign body;  Surgeon: Estrada Chávez MD;  Location:  OR           CURRENT MEDICATIONS:      Current Facility-Administered Medications:     tranexamic acid (CYKLOKAPRON) TOPICAL (injection used topically), , Topical, Once, Becca Mireles PA-C    Current Outpatient Medications:     acetaminophen (TYLENOL) 500 MG tablet, Take 2 tablets (1,000 mg) by mouth every 6 hours as needed for pain or fever., Disp: 60 tablet, Rfl: 0    acetaminophen (TYLENOL) 500 MG tablet, Take 2 tablets (1,000 mg) by mouth every 6 hours as needed for mild pain, Disp: 60  tablet, Rfl: 0    acetaminophen (TYLENOL) 500 MG tablet, Take 2 tablets (1,000 mg) by mouth every 6 hours as needed for pain or fever, Disp: 60 tablet, Rfl: 0    albuterol (PROAIR HFA/PROVENTIL HFA/VENTOLIN HFA) 108 (90 Base) MCG/ACT inhaler, Inhale 2 puffs into the lungs every 6 hours as needed for shortness of breath / dyspnea or wheezing, Disp: 18 g, Rfl: 0    albuterol (PROVENTIL) (2.5 MG/3ML) 0.083% neb solution, Take 1 vial (2.5 mg) by nebulization every 6 hours as needed for shortness of breath or wheezing, Disp: 90 mL, Rfl: 0    Apixaban Starter Pack (ELIQUIS DVT/PE STARTER PACK) 5 MG TBPK, Take 2 tablets (10 mg) by mouth twice daily for 7 days then take 1 tablet (5 mg) twice daily thereafter, Disp: , Rfl:     benzonatate (TESSALON) 100 MG capsule, Take 1 capsule (100 mg) by mouth 3 times daily as needed for cough, Disp: 12 capsule, Rfl: 0    cetirizine (ZYRTEC) 10 MG tablet, Take 1 tablet (10 mg) by mouth daily, Disp: 30 tablet, Rfl: 0    Cholecalciferol (D3 HIGH POTENCY) 25 MCG (1000 UT) CAPS, Take 50 mcg by mouth daily, Disp: , Rfl:     clonazePAM (KLONOPIN) 0.5 MG tablet, Take 1 tablet (0.5 mg) by mouth daily as needed for anxiety, Disp: 7 tablet, Rfl: 0    cyclobenzaprine (FLEXERIL) 10 MG tablet, Take 1 tablet (10 mg) by mouth 3 times daily as needed for muscle spasms, Disp: 20 tablet, Rfl: 0    dicyclomine (BENTYL) 20 MG tablet, Take 1 tablet (20 mg) by mouth 4 times daily as needed (abdominal cramps, diarrhea), Disp: 12 tablet, Rfl: 0    escitalopram (LEXAPRO) 10 MG tablet, Take 10 mg by mouth, Disp: , Rfl:     ferrous sulfate (FEROSUL) 325 (65 Fe) MG tablet, Take 1 tablet (325 mg) by mouth daily (with breakfast), Disp: 30 tablet, Rfl: 0    haloperidol (HALDOL) 2 MG tablet, Take 1 tablet (2 mg) by mouth 3 times daily as needed (uncontrolled vomiting/abdominal pain), Disp: 9 tablet, Rfl: 0    hydrOXYzine HCl (ATARAX) 25 MG tablet, Take 25 mg by mouth, Disp: , Rfl:     insulin pen needle (31G X 8 MM)  31G X 8 MM miscellaneous, Use 1 pen needles daily or as directed., Disp: 100 each, Rfl: 1    montelukast (SINGULAIR) 10 MG tablet, Take 10 mg by mouth every evening, Disp: , Rfl:     norethindrone (AYGESTIN) 5 MG tablet, Take 5 mg by mouth daily, Disp: , Rfl:     ondansetron (ZOFRAN ODT) 4 MG ODT tab, Take 1 tablet (4 mg) by mouth every 8 hours as needed for nausea, Disp: 15 tablet, Rfl: 0    ondansetron (ZOFRAN-ODT) 4 MG ODT tab, Take 1 tablet (4 mg) by mouth every 8 hours as needed for nausea, Disp: 15 tablet, Rfl: 0    pantoprazole (PROTONIX) 40 MG EC tablet, Take 40 mg by mouth daily, Disp: , Rfl:     polyethylene glycol (MIRALAX) 17 GM/Dose powder, Take 17 g by mouth daily as needed for constipation, Disp: , Rfl:     PSYLLIUM PO, Take 1 tsp by mouth daily, Disp: , Rfl:     Respiratory Therapy Supplies (NEBULIZER) BRENDAN, Nebulizer device.  Albuterol nebulization every 2 hours as needed for shortness of breath or wheezing., Disp: 1 each, Rfl: 0    Semaglutide, 2 MG/DOSE, (OZEMPIC) 8 MG/3ML pen, Inject 2 mg Subcutaneous every 7 days, Disp: 9 mL, Rfl: 1    valACYclovir (VALTREX) 1000 mg tablet, Take 2,000 mg by mouth 2 times daily as needed Take 2 tablets by mouth two times daily for one day. Use as needed at onset of cold sore., Disp: , Rfl:       ALLERGIES:  Allergies   Allergen Reactions    Amoxicillin-Pot Clavulanate Other (See Comments), Swelling and Rash     PN: facial swelling, left side. Also had cortisone injection the same day as she started the Augmentin.          Influenza Vaccines Other (See Comments) and Nausea and Vomiting     HUT Reaction: Nausea And Vomiting; HUT Reaction Type: Intolerance; HUT Severity: Low; HUT Noted: [id number]    Latex Rash           Oseltamivir Hives    Penicillins Anaphylaxis    Vancomycin Itching, Swelling and Rash     Flushed face, very itchy    Hydrocodone Nausea and Vomiting and GI Disturbance     vomiting for days    Blood-Group Specific Substance Other (See Comments)      Patient has an anti-Cw and non-specific antibodies. Blood product orders may be delayed. Draw one red top and two purple top tubes for all type/screen/crossmatch orders.  Patient has anti-Cw, anti-K ([place]), Warm auto and nonspecific antibodies. Blood products may be delayed. Draw patient 24 hours prior to transfusion. Draw one red top and two purple top tubes for all type and screen orders.    Clavulanic Acid Angioedema    Haemophilus B Polysaccharide Vaccine Nausea and Vomiting    Oxycodone Swelling    Adhesive Tape Rash     Silicone type  Adhesive allergy      Band-Aid Anti-Itch      Other reaction(s): adhesive allergy    Cephalosporins Rash    Lamotrigine Rash     Possibly associated with Lamictal.     Naltrexone Other (See Comments)     Reaction(s): Vivid dreams.    No Clinical Screening - See Comments Other (See Comments)     History of swallowing sharp metallic objects. She should not be prescribed lancets due to posed risk of swallowing.        FAMILY HISTORY:  Family History   Problem Relation Age of Onset    Diabetes Type 2  Maternal Grandmother     Diabetes Type 2  Paternal Grandmother     Breast Cancer Paternal Grandmother     Cerebrovascular Disease Father 53    Myocardial Infarction No family hx of     Coronary Artery Disease Early Onset No family hx of     Ovarian Cancer No family hx of     Colon Cancer No family hx of     Depression Mother     Anxiety Disorder Mother        SOCIAL HISTORY:   Social History     Socioeconomic History    Marital status: Single   Occupational History    Occupation: On disability   Tobacco Use    Smoking status: Never    Smokeless tobacco: Never   Substance and Sexual Activity    Alcohol use: No     Alcohol/week: 0.0 standard drinks of alcohol    Drug use: No    Sexual activity: Not Currently     Partners: Male     Birth control/protection: I.U.D.     Comment: IUD - Mirena - placed July, 2015   Social History Narrative    Single.    Living in independent living portion of  "[employer].    On disability.    No regular exercise.      Social Determinants of Health     Financial Resource Strain: Low Risk  (6/16/2023)    [facility], [facility]    Financial Resource Strain     Difficulty of Paying Living Expenses: 3   Food Insecurity: No Food Insecurity (6/16/2023)    Received from [facility], [facility]    Food Insecurity     Worried About Running Out of Food in the Last Year: 1   Transportation Needs: No Transportation Needs (6/16/2023)    Received from [facility], [facility]    Transportation Needs     Lack of Transportation (Medical): 1   Social Connections: Socially Integrated (6/16/2023)    Received from [facility], [facility]    Social Connections     Frequency of Communication with Friends and Family: 0   Interpersonal Safety: Unknown (4/27/2024)    Received from [facility]    Humiliation, Afraid, Rape, and Kick questionnaire     Physically Abused: No     Sexually Abused: No   Housing Stability: Low Risk  (6/16/2023)    Received from [facility], [facility]    Housing Stability     Unable to Pay for Housing in the Last Year: 1       VITALS:  Patient Vitals for the past 24 hrs:   BP Temp Temp src Pulse Resp SpO2 Height Weight   08/23/24 2105 (!) 141/87 -- -- -- -- -- -- --   08/23/24 2055 130/82 -- -- 93 -- 97 % -- --   08/23/24 2047 130/82 98.5  F (36.9  C) Oral 99 20 94 % 1.575 m (5' 2\") 109.3 kg (241 lb)       PHYSICAL EXAM    Constitutional: Well developed, Well nourished, NAD  HENT: Normocephalic, old bruising to right cheek no significant facial swelling, Bilateral external ears normal, Oropharynx normal, mucous " membranes moist, Nose normal. Surgical extraction site to the right upper jaw with grape sized hematoma, removed without significant bleeding.   Neck: Normal range of motion, No tenderness, Supple, No stridor.  Eyes: Eyes track normally throughout exam, Conjunctiva normal, No discharge.   Respiratory:Speaks full sentences easily. No cough.  Cardiovascular: Heart rate and blood pressure as above.  Musculoskeletal: Good range of motion in all major joints.   Integument: Warm, Dry, No erythema, No rash. No petechiae.  Neurologic: Alert & oriented x 3, Normal motor function, Normal sensory function, No focal deficits noted. Normal gait.  Psychiatric: Affect normal, Judgment normal, Mood normal. Cooperative.    LAB:  All pertinent labs reviewed and interpreted.  Recent Results (from the past 24 hour(s))   Hemoglobin    Collection Time: 08/23/24 10:16 PM   Result Value Ref Range    Hemoglobin 13.9 11.7 - 15.7 g/dL   INR    Collection Time: 08/23/24 10:16 PM   Result Value Ref Range    INR 1.36 (H) 0.85 - 1.15         RADIOLOGY:  Reviewed all pertinent imaging. Please see official radiology report.  No orders to display       PROCEDURES:   PROCEDURE: Dental hematoma removal   INDICATIONS: Dental pain/bleeding   PROCEDURE PROVIDER: Becca Mireles PA-C   CONSENT: Risks, benefits and alternatives were discussed with and Verbal consent was obtained from Patient.   MEDICATIONS: 5 mLs of 1% Lidocaine with epinephrine    TXA soaked guaze     DETAILS: Hematoma with sutures in place, sutures visualized and cut and hematoma gently removed. No significant bleeding, some oozing. TXA and lidocaine with epi soaked guaze applied for 20 minutes. After removal bleeding controlled.   COMPLICATIONS: Patient tolerated procedure well, without complication           IDick, am serving as a scribe to document services personally performed by Becca Mireles PA-C based on my observation and the provider's statements to me. I,  Becca Mireles PA-C attest that Dick Carvajal is acting in a scribe capacity, has observed my performance of the services and has documented them in accordance with my direction.    Becca Mireles PA-C  Emergency Medicine  [facility]  8/23/2024       Becca Mireles PA-C  08/23/24 2239

## 2024-08-24 NOTE — ED PROVIDER NOTES
Emergency Department Midlevel Supervisory Note     I personally saw the patient and performed a substantive portion of the visit including all aspects of the medical decision making.    ED Course:  10:40 PM Becca Mireles PA-C staffed patient with me. I agree with their assessment and plan of management, and I will see the patient.  10:48 PM I met with the patient to introduce myself, gather additional history, perform my initial exam, and discuss the plan.     Brief HPI:     Nevin Alvarado is a 33 year old female who presents for evaluation of bleeding from her tooth extraction site.  Chronically anticoagulated secondary to pulmonary embolism.  Tooth extraction presenting with persistent bleeding.  Evaluated earlier this evening.    I, Terri Ybarra, am serving as a scribe to document services personally performed by Yuniel Dye MD, based on my observations and the provider's statements to me.   I, Yuniel Dye MD, attest that Terri Ybarra was acting in a scribe capacity, has observed my performance of the services and has documented them in accordance with my direction.    Brief Physical Exam:  Constitutional:  Alert, in no acute distress  EYES: Conjunctivae clear  HENT: There was a 1.5 to 2 cm clot overlying patient's dental extraction site no significant bleeding noted at this time.  Respiratory:  Respirations even, unlabored, in no acute respiratory distress  Cardiovascular:  Regular rate and rhythm, good peripheral perfusion  GI: Soft, nondistended, nontender, no palpable masses, no rebound, no guarding   Musculoskeletal:  No edema. No cyanosis. Range of motion major extremities intact.    Integument: Warm, Dry, No erythema, No rash.   Neurologic:  Alert & oriented, no focal deficits noted  Psych: Normal mood and affect     MDM:  Hemoglobin normal.  No significant bleeding noted at this time there was a clot present overlying patient surgical sites which was removed.  No significant  ongoing bleeding I think patient is appropriate for discharge home and follow-up with her dental team on outpatient basis.       1. Hematoma    2. Pain, dental        Labs and Imaging:  Results for orders placed or performed during the hospital encounter of 08/23/24   Result Value Ref Range    Hemoglobin 13.9 11.7 - 15.7 g/dL   Result Value Ref Range    INR 1.36 (H) 0.85 - 1.15     I have reviewed the relevant laboratory and radiology studies    Procedures:  I was present for the key portions of this procedure: none    Yunile Dye MD   [facility] EMERGENCY ROOM  [address] [place] [id number] [phone]     Yuniel Dye MD  08/23/24 [id number]

## 2024-08-26 ENCOUNTER — APPOINTMENT (OUTPATIENT)
Dept: RADIOLOGY | Facility: CLINIC | Age: 34
End: 2024-08-26
Attending: EMERGENCY MEDICINE
Payer: COMMERCIAL

## 2024-08-26 ENCOUNTER — HOSPITAL ENCOUNTER (EMERGENCY)
Facility: CLINIC | Age: 34
Discharge: HOME | End: 2024-08-26
Attending: EMERGENCY MEDICINE | Admitting: EMERGENCY MEDICINE
Payer: COMMERCIAL

## 2024-08-26 VITALS
OXYGEN SATURATION: 97 % | HEART RATE: 81 BPM | BODY MASS INDEX: 39.99 KG/M2 | TEMPERATURE: 98 F | RESPIRATION RATE: 22 BRPM | DIASTOLIC BLOOD PRESSURE: 78 MMHG | WEIGHT: 240 LBS | HEIGHT: 65 IN | SYSTOLIC BLOOD PRESSURE: 133 MMHG

## 2024-08-26 DIAGNOSIS — R07.89 CHEST DISCOMFORT: ICD-10-CM

## 2024-08-26 LAB
ALBUMIN SERPL BCG-MCNC: 4.4 G/DL (ref 3.5–5.2)
ALP SERPL-CCNC: 75 U/L (ref 40–150)
ALT SERPL W P-5'-P-CCNC: 31 U/L (ref 0–50)
ANION GAP SERPL CALCULATED.3IONS-SCNC: 12 MMOL/L (ref 7–15)
AST SERPL W P-5'-P-CCNC: 23 U/L (ref 0–45)
BASOPHILS # BLD AUTO: 0 10E3/UL (ref 0–0.2)
BASOPHILS NFR BLD AUTO: 0 %
BILIRUB SERPL-MCNC: 0.3 MG/DL
BUN SERPL-MCNC: 8.4 MG/DL (ref 6–20)
CALCIUM SERPL-MCNC: 9.7 MG/DL (ref 8.8–10.4)
CHLORIDE SERPL-SCNC: 106 MMOL/L (ref 98–107)
CREAT SERPL-MCNC: 0.52 MG/DL (ref 0.51–0.95)
EGFRCR SERPLBLD CKD-EPI 2021: >90 ML/MIN/1.73M2
EOSINOPHIL # BLD AUTO: 0.1 10E3/UL (ref 0–0.7)
EOSINOPHIL NFR BLD AUTO: 1 %
ERYTHROCYTE [DISTWIDTH] IN BLOOD BY AUTOMATED COUNT: 13.2 % (ref 10–15)
GLUCOSE SERPL-MCNC: 91 MG/DL (ref 70–99)
HCO3 SERPL-SCNC: 24 MMOL/L (ref 22–29)
HCT VFR BLD AUTO: 41.7 % (ref 35–47)
HGB BLD-MCNC: 14.1 G/DL (ref 11.7–15.7)
IMM GRANULOCYTES # BLD: 0 10E3/UL
IMM GRANULOCYTES NFR BLD: 0 %
LYMPHOCYTES # BLD AUTO: 2.8 10E3/UL (ref 0.8–5.3)
LYMPHOCYTES NFR BLD AUTO: 31 %
MCH RBC QN AUTO: 29.8 PG (ref 26.5–33)
MCHC RBC AUTO-ENTMCNC: 33.8 G/DL (ref 31.5–36.5)
MCV RBC AUTO: 88 FL (ref 78–100)
MONOCYTES # BLD AUTO: 0.6 10E3/UL (ref 0–1.3)
MONOCYTES NFR BLD AUTO: 7 %
NEUTROPHILS # BLD AUTO: 5.4 10E3/UL (ref 1.6–8.3)
NEUTROPHILS NFR BLD AUTO: 60 %
NRBC # BLD AUTO: 0 10E3/UL
NRBC BLD AUTO-RTO: 0 /100
PLATELET # BLD AUTO: 342 10E3/UL (ref 150–450)
POTASSIUM SERPL-SCNC: 4.3 MMOL/L (ref 3.4–5.3)
PROT SERPL-MCNC: 7.5 G/DL (ref 6.4–8.3)
RBC # BLD AUTO: 4.73 10E6/UL (ref 3.8–5.2)
SODIUM SERPL-SCNC: 142 MMOL/L (ref 135–145)
TROPONIN T SERPL HS-MCNC: 8 NG/L
WBC # BLD AUTO: 8.9 10E3/UL (ref 4–11)

## 2024-08-26 PROCEDURE — 85048 AUTOMATED LEUKOCYTE COUNT: CPT | Performed by: EMERGENCY MEDICINE

## 2024-08-26 PROCEDURE — 99285 EMERGENCY DEPT VISIT HI MDM: CPT | Mod: 25

## 2024-08-26 PROCEDURE — 96374 THER/PROPH/DIAG INJ IV PUSH: CPT

## 2024-08-26 PROCEDURE — 250N000013 HC RX MED GY IP 250 OP 250 PS 637: Performed by: EMERGENCY MEDICINE

## 2024-08-26 PROCEDURE — 84484 ASSAY OF TROPONIN QUANT: CPT | Performed by: EMERGENCY MEDICINE

## 2024-08-26 PROCEDURE — 93005 ELECTROCARDIOGRAM TRACING: CPT | Performed by: EMERGENCY MEDICINE

## 2024-08-26 PROCEDURE — 96375 TX/PRO/DX INJ NEW DRUG ADDON: CPT

## 2024-08-26 PROCEDURE — 250N000011 HC RX IP 250 OP 636: Performed by: EMERGENCY MEDICINE

## 2024-08-26 PROCEDURE — 71046 X-RAY EXAM CHEST 2 VIEWS: CPT

## 2024-08-26 PROCEDURE — 80053 COMPREHEN METABOLIC PANEL: CPT | Performed by: EMERGENCY MEDICINE

## 2024-08-26 PROCEDURE — 36415 COLL VENOUS BLD VENIPUNCTURE: CPT | Performed by: EMERGENCY MEDICINE

## 2024-08-26 RX ORDER — FAMOTIDINE 20 MG/1
20 TABLET, FILM COATED ORAL 2 TIMES DAILY PRN
Qty: 20 TABLET | Refills: 0 | Status: SHIPPED | OUTPATIENT
Start: 2024-08-26

## 2024-08-26 RX ORDER — MAGNESIUM HYDROXIDE/ALUMINUM HYDROXICE/SIMETHICONE 120; 1200; 1200 MG/30ML; MG/30ML; MG/30ML
15 SUSPENSION ORAL ONCE
Status: COMPLETED | OUTPATIENT
Start: 2024-08-26 | End: 2024-08-26

## 2024-08-26 RX ORDER — METHYLPREDNISOLONE SODIUM SUCCINATE 125 MG/2ML
125 INJECTION, POWDER, LYOPHILIZED, FOR SOLUTION INTRAMUSCULAR; INTRAVENOUS ONCE
Status: COMPLETED | OUTPATIENT
Start: 2024-08-26 | End: 2024-08-26

## 2024-08-26 RX ORDER — DIPHENHYDRAMINE HYDROCHLORIDE 50 MG/ML
25 INJECTION INTRAMUSCULAR; INTRAVENOUS ONCE
Status: COMPLETED | OUTPATIENT
Start: 2024-08-26 | End: 2024-08-26

## 2024-08-26 RX ADMIN — METHYLPREDNISOLONE SODIUM SUCCINATE 125 MG: 125 INJECTION, POWDER, FOR SOLUTION INTRAMUSCULAR; INTRAVENOUS at 17:43

## 2024-08-26 RX ADMIN — DIPHENHYDRAMINE HYDROCHLORIDE 25 MG: 50 INJECTION INTRAMUSCULAR; INTRAVENOUS at 17:46

## 2024-08-26 RX ADMIN — FAMOTIDINE 20 MG: 10 INJECTION, SOLUTION INTRAVENOUS at 17:45

## 2024-08-26 RX ADMIN — ALUMINUM HYDROXIDE, MAGNESIUM HYDROXIDE, AND SIMETHICONE 15 ML: 1200; 120; 1200 SUSPENSION ORAL at 20:08

## 2024-08-26 ASSESSMENT — ENCOUNTER SYMPTOMS
PALPITATIONS: 0
SHORTNESS OF BREATH: 1
TROUBLE SWALLOWING: 1

## 2024-08-26 ASSESSMENT — ACTIVITIES OF DAILY LIVING (ADL)
ADLS_ACUITY_SCORE: 43

## 2024-08-26 NOTE — ED PROVIDER NOTES
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 ESOPHAGOGASTRODUODENOSCOPY (EGD) with foreign body removal;  Surgeon: Jeffy Zuñiga MD;  Location: [facility];  Service: Gastroenterology    UT ESOPHAGOGASTRODUODENOSCOPY TRANSORAL DIAGNOSTIC N/A 12/17/2019    Procedure: ESOPHAGOGASTRODUODENOSCOPY (EGD) with attempted foreign body removal;  Surgeon: Mohsen  Felipe KRAMER MD;  Location: [facility];  Service: Gastroenterology    HI ESOPHAGOGASTRODUODENOSCOPY TRANSORAL DIAGNOSTIC N/A 12/21/2019    Procedure: ESOPHAGOGASTRODUODENOSCOPY (EGD) FOR FROEIGN BODY REMOVAL;  Surgeon: Binu Vigil MD;  Location: [facility];  Service: Gastroenterology    HI ESOPHAGOGASTRODUODENOSCOPY TRANSORAL DIAGNOSTIC N/A 7/22/2020    Procedure: ESOPHAGOGASTRODUODENOSCOPY (EGD);  Surgeon: Bailey Arnold MD;  Location: [facility];  Service: Gastroenterology    HI ESOPHAGOGASTRODUODENOSCOPY TRANSORAL DIAGNOSTIC N/A 8/14/2020    Procedure: ESOPHAGOGASTRODUODENOSCOPY (EGD) FOREIGN BODY REMOVAL;  Surgeon: Jeffy Zuñiga MD;  Location: [facility];  Service: Gastroenterology    HI ESOPHAGOGASTRODUODENOSCOPY TRANSORAL DIAGNOSTIC N/A 2/25/2021    Procedure: ESOPHAGOGASTRODUODENOSCOPY (EGD) with foreign body reoval;  Surgeon: Bird Sethi MD;  Location: [facility];  Service: Gastroenterology    HI ESOPHAGOGASTRODUODENOSCOPY TRANSORAL DIAGNOSTIC N/A 4/19/2021    Procedure: ESOPHAGOGASTRODUODENOSCOPY (EGD);  Surgeon: Libia Rose MD;  Location: [facility];  Service: Gastroenterology    HI SURG DIAGNOSTIC EXAM, ANORECTAL N/A 2/5/2020    Procedure: EXAM UNDER ANESTHESIA, Flexible Sigmoidoscopy, Retrieval of Foreign Body;  Surgeon: Sasha Ivan MD;  Location: [facility];  Service: General    RELEASE CARPAL TUNNEL Bilateral     RELEASE CARPAL TUNNEL Bilateral     REMOVAL, FOREIGN BODY, RECTUM N/A 7/21/2021    Procedure: MANUAL RETREIVALOF FOREIGN OBJECT- RECTUM.;  Surgeon: Aleksandra Gerber MD;  Location: [facility] OR    SIGMOIDOSCOPY FLEXIBLE N/A 1/10/2017    Procedure: SIGMOIDOSCOPY FLEXIBLE;  Surgeon: Annmarie Haynes MD;  Location: [facility]    SIGMOIDOSCOPY FLEXIBLE N/A 5/8/2018    Procedure: SIGMOIDOSCOPY FLEXIBLE;  flex sig with foreign body removal using snare and rattooth forcep;  Surgeon: Soham Cano MD;  Location:   GI    SIGMOIDOSCOPY FLEXIBLE N/A 2/20/2019    Procedure: Exam under anesthesia Colonoscopy with attempted  removal of rectal foreign body;  Surgeon: Estrada Chávez MD;  [facility]           CURRENT MEDICATIONS:    acetaminophen (TYLENOL) 500 MG tablet  acetaminophen (TYLENOL) 500 MG tablet  acetaminophen (TYLENOL) 500 MG tablet  albuterol (PROAIR HFA/PROVENTIL HFA/VENTOLIN HFA) 108 (90 Base) MCG/ACT inhaler  albuterol (PROVENTIL) (2.5 MG/3ML) 0.083% neb solution  Apixaban Starter Pack (ELIQUIS DVT/PE STARTER PACK) 5 MG TBPK  benzonatate (TESSALON) 100 MG capsule  cetirizine (ZYRTEC) 10 MG tablet  Cholecalciferol (D3 HIGH POTENCY) 25 MCG (1000 UT) CAPS  clonazePAM (KLONOPIN) 0.5 MG tablet  cyclobenzaprine (FLEXERIL) 10 MG tablet  dicyclomine (BENTYL) 20 MG tablet  escitalopram (LEXAPRO) 10 MG tablet  ferrous sulfate (FEROSUL) 325 (65 Fe) MG tablet  haloperidol (HALDOL) 2 MG tablet  hydrOXYzine HCl (ATARAX) 25 MG tablet  insulin pen needle (31G X 8 MM) 31G X 8 MM miscellaneous  montelukast (SINGULAIR) 10 MG tablet  norethindrone (AYGESTIN) 5 MG tablet  ondansetron (ZOFRAN ODT) 4 MG ODT tab  ondansetron (ZOFRAN-ODT) 4 MG ODT tab  pantoprazole (PROTONIX) 40 MG EC tablet  polyethylene glycol (MIRALAX) 17 GM/Dose powder  PSYLLIUM PO  Respiratory Therapy Supplies (NEBULIZER) BRENDAN  Semaglutide, 2 MG/DOSE, (OZEMPIC) 8 MG/3ML pen  valACYclovir (VALTREX) 1000 mg tablet        ALLERGIES:  Allergies   Allergen Reactions    Amoxicillin-Pot Clavulanate Other (See Comments), Swelling and Rash     PN: facial swelling, left side. Also had cortisone injection the same day as she started the Augmentin.          Influenza Vaccines Other (See Comments) and Nausea and Vomiting     HUT Reaction: Nausea And Vomiting; HUT Reaction Type: Intolerance; HUT Severity: Low; HUT Noted: [id number]    Latex Rash           Oseltamivir Hives    Penicillins Anaphylaxis    Vancomycin Itching, Swelling and Rash     Flushed face, very itchy     Hydrocodone Nausea and Vomiting and GI Disturbance     vomiting for days    Blood-Group Specific Substance Other (See Comments)     Patient has an anti-Cw and non-specific antibodies. Blood product orders may be delayed. Draw one red top and two purple top tubes for all type/screen/crossmatch orders.  Patient has anti-Cw, anti-K (Brainard), Warm auto and nonspecific antibodies. Blood products may be delayed. Draw patient 24 hours prior to transfusion. Draw one red top and two purple top tubes for all type and screen orders.    Clavulanic Acid Angioedema    Haemophilus B Polysaccharide Vaccine Nausea and Vomiting    Oxycodone Swelling    Adhesive Tape Rash     Silicone type  Adhesive allergy      Band-Aid Anti-Itch      Other reaction(s): adhesive allergy    Cephalosporins Rash    Lamotrigine Rash     Possibly associated with Lamictal.     Naltrexone Other (See Comments)     Reaction(s): Vivid dreams.    No Clinical Screening - See Comments Other (See Comments)     History of swallowing sharp metallic objects. She should not be prescribed lancets due to posed risk of swallowing.        FAMILY HISTORY:  Family History   Problem Relation Age of Onset    Diabetes Type 2  Maternal Grandmother     Diabetes Type 2  Paternal Grandmother     Breast Cancer Paternal Grandmother     Cerebrovascular Disease Father 53    Myocardial Infarction No family hx of     Coronary Artery Disease Early Onset No family hx of     Ovarian Cancer No family hx of     Colon Cancer No family hx of     Depression Mother     Anxiety Disorder Mother        SOCIAL HISTORY:   Social History     Socioeconomic History    Marital status: Single   Occupational History    Occupation: On disability   Tobacco Use    Smoking status: Never    Smokeless tobacco: Never   Substance and Sexual Activity    Alcohol use: No     Alcohol/week: 0.0 standard drinks of alcohol    Drug use: No    Sexual activity: Not Currently     Partners: Male     Birth control/protection:  "I.U.D.     Comment: IUD - Mirena - jonnie July, 2015   Social History Narrative    Single.    Living in independent living portion of [employer].    On disability.    No regular exercise.      Social Determinants of Health     Financial Resource Strain: Low Risk  (6/16/2023)    [facility], [facility]    Financial Resource Strain     Difficulty of Paying Living Expenses: 3   Food Insecurity: No Food Insecurity (6/16/2023)    Received from [facility], [facility]    Food Insecurity     Worried About Running Out of Food in the Last Year: 1   Transportation Needs: No Transportation Needs (6/16/2023)    Received from [facility], [facility]    Transportation Needs     Lack of Transportation (Medical): 1   Social Connections: Socially Integrated (6/16/2023)    Received from [facility], [facility]    Social Connections     Frequency of Communication with Friends and Family: 0   Interpersonal Safety: Unknown (4/27/2024)    Received from [facility]    Humiliation, Afraid, Rape, and Kick questionnaire     Physically Abused: No     Sexually Abused: No   Housing Stability: Low Risk  (6/16/2023)    Received from [facility], [facility]    Housing Stability     Unable to Pay for Housing in the Last Year: 1       VITALS:  BP (!) 144/76   Pulse 78   Temp 98.6  F (37  C) (Oral)   Resp 18   Ht 1.651 m (5' 5\")   Wt 108.9 kg (240 lb)   LMP 04/25/2024   SpO2 98%   BMI 39.94 kg/m      PHYSICAL EXAM    General presentation: Alert, Vital signs reviewed. NAD. Anxious appearing.  HENT: ENT inspection is normal. Oropharynx is moist and clear. No angioedema  Eye: Pupils are " equal and reactive to light. EOMI  Neck: The neck is supple, with full ROM, with no evidence of meningismus. no stridor  Pulmonary: Currently in no acute respiratory distress. Normal, non labored respirations, the lung sounds are normal with good equal air movement. Clear to auscultation bilaterally.   Circulatory: Regular rate and rhythm. Peripheral pulses are strong and equal. No murmurs, rubs, or gallops.   Abdominal: The abdomen is soft. Nontender. No rigidity, guarding, or rebound. Bowel sounds normal.   Neurologic: Alert, oriented to person, place, and time. No motor deficit. No sensory deficit. Cranial nerves II through XII are intact.  Musculoskeletal: No extremity tenderness. Full range of motion in all extremities. No extremity edema.   Skin: Skin color is normal. No rash. Warm. Dry to touch. No urticaria noted.     LAB:  All pertinent labs reviewed and interpreted.  Results for orders placed or performed during the hospital encounter of 08/26/24   Comprehensive metabolic panel   Result Value Ref Range    Sodium 142 135 - 145 mmol/L    Potassium 4.3 3.4 - 5.3 mmol/L    Carbon Dioxide (CO2) 24 22 - 29 mmol/L    Anion Gap 12 7 - 15 mmol/L    Urea Nitrogen 8.4 6.0 - 20.0 mg/dL    Creatinine 0.52 0.51 - 0.95 mg/dL    GFR Estimate >90 >60 mL/min/1.73m2    Calcium 9.7 8.8 - 10.4 mg/dL    Chloride 106 98 - 107 mmol/L    Glucose 91 70 - 99 mg/dL    Alkaline Phosphatase 75 40 - 150 U/L    AST 23 0 - 45 U/L    ALT 31 0 - 50 U/L    Protein Total 7.5 6.4 - 8.3 g/dL    Albumin 4.4 3.5 - 5.2 g/dL    Bilirubin Total 0.3 <=1.2 mg/dL   CBC with platelets and differential   Result Value Ref Range    WBC Count 8.9 4.0 - 11.0 10e3/uL    RBC Count 4.73 3.80 - 5.20 10e6/uL    Hemoglobin 14.1 11.7 - 15.7 g/dL    Hematocrit 41.7 35.0 - 47.0 %    MCV 88 78 - 100 fL    MCH 29.8 26.5 - 33.0 pg    MCHC 33.8 31.5 - 36.5 g/dL    RDW 13.2 10.0 - 15.0 %    Platelet Count 342 150 - 450 10e3/uL    % Neutrophils 60 %    % Lymphocytes 31 %     % Monocytes 7 %    % Eosinophils 1 %    % Basophils 0 %    % Immature Granulocytes 0 %    NRBCs per 100 WBC 0 <1 /100    Absolute Neutrophils 5.4 1.6 - 8.3 10e3/uL    Absolute Lymphocytes 2.8 0.8 - 5.3 10e3/uL    Absolute Monocytes 0.6 0.0 - 1.3 10e3/uL    Absolute Eosinophils 0.1 0.0 - 0.7 10e3/uL    Absolute Basophils 0.0 0.0 - 0.2 10e3/uL    Absolute Immature Granulocytes 0.0 <=0.4 10e3/uL    Absolute NRBCs 0.0 10e3/uL   Troponin T, High Sensitivity (now)   Result Value Ref Range    Troponin T, High Sensitivity 8 <=14 ng/L       RADIOLOGY:  Reviewed all pertinent imaging. Please see official radiology report.  Chest XR,  PA & LAT    (Results Pending)       EKG:    Normal sinus rhythm.  Rate of 79.  Normal QRS.  Normal QT.  Nonspecific T wave changes noted.  Compared to the EKG on 8/2/2024 no significant changes are noted.    I have independently reviewed and interpreted the EKG(s) documented above.        I, Uri Ruiz , am serving as a scribe to document services personally performed by Zainab Evans DO based on my observation and the provider's statements to me. I, Zainab Evans, attest that Uri Ruiz is acting in a scribe capacity, has observed my performance of the services and has documented them in accordance with my direction.    Zainab Evans DO  Emergency Medicine  [facility] EMERGENCY ROOM  [address] [address] [phone]       Zainab Evans DO  08/26/24 2052

## 2024-08-27 NOTE — ED NOTES
All Removal items removed from the room and room searched for possible items that could be ingested.  Patient contracted for safely.  Curtain left open and door open for continuous observation.

## 2024-08-27 NOTE — DISCHARGE INSTRUCTIONS
Your chest x-ray, EKG, and laboratory test all appear reassuring here today in the emergency department.  Your symptoms do not appear consistent with an allergic reaction here today in the emergency department.  Most likely cause of your symptoms appears to be acid reflux which can be made worse when taking medications.  Continue taking your clindamycin as directed.  The prescribed famotidine twice a day as needed for any worsening acid reflux symptoms while taking advised him.  Follow-up with your primary care provider and/or dentist for reevaluation.

## 2024-08-29 ENCOUNTER — TELEPHONE (OUTPATIENT)
Dept: GASTROENTEROLOGY | Facility: CLINIC | Age: 34
End: 2024-08-29
Payer: COMMERCIAL

## 2024-08-29 NOTE — TELEPHONE ENCOUNTER
Caller: No call made    Reason for Reschedule/Cancellation   (please be detailed, any staff messages or encounters to note?): per pt care team, procedure is no longer needed      Prior to reschedule please review:  Ordering Provider: TOM NAZARIO   Sedation Determined: MAC  Does patient have any ASC Exclusions, please identify?: Yes, mobility issues      Notes on Cancelled Procedure:  Procedure: Upper Endoscopy [EGD]   Date: 09/10/2024  Location: [facility]; [address] [place] [address], 3rd Floor, [place] [id number]   Surgeon: DALTON      Rescheduled: No,         Did you cancel or rescheduled an EUS procedure? No.

## 2024-09-04 LAB
ATRIAL RATE - MUSE: 79 BPM
DIASTOLIC BLOOD PRESSURE - MUSE: 91 MMHG
INTERPRETATION ECG - MUSE: NORMAL
P AXIS - MUSE: 35 DEGREES
PR INTERVAL - MUSE: 144 MS
QRS DURATION - MUSE: 74 MS
QT - MUSE: 344 MS
QTC - MUSE: 394 MS
R AXIS - MUSE: 82 DEGREES
SYSTOLIC BLOOD PRESSURE - MUSE: 176 MMHG
T AXIS - MUSE: 10 DEGREES
VENTRICULAR RATE- MUSE: 79 BPM

## 2024-10-12 ENCOUNTER — APPOINTMENT (OUTPATIENT)
Dept: GENERAL RADIOLOGY | Facility: CLINIC | Age: 34
End: 2024-10-12
Attending: EMERGENCY MEDICINE
Payer: COMMERCIAL

## 2024-10-12 ENCOUNTER — HOSPITAL ENCOUNTER (EMERGENCY)
Facility: CLINIC | Age: 34
Discharge: HOME | End: 2024-10-13
Attending: EMERGENCY MEDICINE | Admitting: EMERGENCY MEDICINE
Payer: COMMERCIAL

## 2024-10-12 DIAGNOSIS — R07.9 CHEST PAIN, UNSPECIFIED TYPE: ICD-10-CM

## 2024-10-12 DIAGNOSIS — R10.13 EPIGASTRIC PAIN: ICD-10-CM

## 2024-10-12 DIAGNOSIS — R06.02 SHORTNESS OF BREATH: ICD-10-CM

## 2024-10-12 LAB
ALBUMIN SERPL BCG-MCNC: 4.3 G/DL (ref 3.5–5.2)
ALP SERPL-CCNC: 88 U/L (ref 40–150)
ALT SERPL W P-5'-P-CCNC: 30 U/L (ref 0–50)
ANION GAP SERPL CALCULATED.3IONS-SCNC: 11 MMOL/L (ref 7–15)
AST SERPL W P-5'-P-CCNC: 17 U/L (ref 0–45)
BASOPHILS # BLD AUTO: 0.1 10E3/UL (ref 0–0.2)
BASOPHILS NFR BLD AUTO: 0 %
BILIRUB SERPL-MCNC: 0.2 MG/DL
BUN SERPL-MCNC: 11.5 MG/DL (ref 6–20)
CALCIUM SERPL-MCNC: 9.6 MG/DL (ref 8.8–10.4)
CHLORIDE SERPL-SCNC: 104 MMOL/L (ref 98–107)
CK SERPL-CCNC: 57 U/L (ref 26–192)
CREAT SERPL-MCNC: 0.66 MG/DL (ref 0.51–0.95)
EGFRCR SERPLBLD CKD-EPI 2021: >90 ML/MIN/1.73M2
EOSINOPHIL # BLD AUTO: 0.1 10E3/UL (ref 0–0.7)
EOSINOPHIL NFR BLD AUTO: 1 %
ERYTHROCYTE [DISTWIDTH] IN BLOOD BY AUTOMATED COUNT: 13 % (ref 10–15)
GLUCOSE SERPL-MCNC: 102 MG/DL (ref 70–99)
HCG SERPL QL: NEGATIVE
HCO3 SERPL-SCNC: 26 MMOL/L (ref 22–29)
HCT VFR BLD AUTO: 42.3 % (ref 35–47)
HGB BLD-MCNC: 13.8 G/DL (ref 11.7–15.7)
HOLD SPECIMEN: NORMAL
IMM GRANULOCYTES # BLD: 0 10E3/UL
IMM GRANULOCYTES NFR BLD: 0 %
LIPASE SERPL-CCNC: 30 U/L (ref 13–60)
LYMPHOCYTES # BLD AUTO: 2.4 10E3/UL (ref 0.8–5.3)
LYMPHOCYTES NFR BLD AUTO: 21 %
MAGNESIUM SERPL-MCNC: 2 MG/DL (ref 1.7–2.3)
MCH RBC QN AUTO: 29.1 PG (ref 26.5–33)
MCHC RBC AUTO-ENTMCNC: 32.6 G/DL (ref 31.5–36.5)
MCV RBC AUTO: 89 FL (ref 78–100)
MONOCYTES # BLD AUTO: 0.7 10E3/UL (ref 0–1.3)
MONOCYTES NFR BLD AUTO: 6 %
NEUTROPHILS # BLD AUTO: 8.3 10E3/UL (ref 1.6–8.3)
NEUTROPHILS NFR BLD AUTO: 72 %
NRBC # BLD AUTO: 0 10E3/UL
NRBC BLD AUTO-RTO: 0 /100
PLATELET # BLD AUTO: 340 10E3/UL (ref 150–450)
POTASSIUM SERPL-SCNC: 3.8 MMOL/L (ref 3.4–5.3)
PROT SERPL-MCNC: 7.1 G/DL (ref 6.4–8.3)
RBC # BLD AUTO: 4.75 10E6/UL (ref 3.8–5.2)
SODIUM SERPL-SCNC: 141 MMOL/L (ref 135–145)
TROPONIN T SERPL HS-MCNC: 10 NG/L
TSH SERPL DL<=0.005 MIU/L-ACNC: 4.54 UIU/ML (ref 0.3–4.2)
WBC # BLD AUTO: 11.6 10E3/UL (ref 4–11)

## 2024-10-12 PROCEDURE — 84703 CHORIONIC GONADOTROPIN ASSAY: CPT | Performed by: EMERGENCY MEDICINE

## 2024-10-12 PROCEDURE — 83690 ASSAY OF LIPASE: CPT | Performed by: EMERGENCY MEDICINE

## 2024-10-12 PROCEDURE — 96375 TX/PRO/DX INJ NEW DRUG ADDON: CPT

## 2024-10-12 PROCEDURE — 85004 AUTOMATED DIFF WBC COUNT: CPT | Performed by: EMERGENCY MEDICINE

## 2024-10-12 PROCEDURE — 84439 ASSAY OF FREE THYROXINE: CPT | Performed by: EMERGENCY MEDICINE

## 2024-10-12 PROCEDURE — 99285 EMERGENCY DEPT VISIT HI MDM: CPT | Mod: 25

## 2024-10-12 PROCEDURE — 84443 ASSAY THYROID STIM HORMONE: CPT | Performed by: EMERGENCY MEDICINE

## 2024-10-12 PROCEDURE — 36415 COLL VENOUS BLD VENIPUNCTURE: CPT | Performed by: EMERGENCY MEDICINE

## 2024-10-12 PROCEDURE — 71046 X-RAY EXAM CHEST 2 VIEWS: CPT

## 2024-10-12 PROCEDURE — 82550 ASSAY OF CK (CPK): CPT | Performed by: EMERGENCY MEDICINE

## 2024-10-12 PROCEDURE — 84484 ASSAY OF TROPONIN QUANT: CPT | Performed by: EMERGENCY MEDICINE

## 2024-10-12 PROCEDURE — 250N000011 HC RX IP 250 OP 636: Performed by: EMERGENCY MEDICINE

## 2024-10-12 PROCEDURE — 93005 ELECTROCARDIOGRAM TRACING: CPT

## 2024-10-12 PROCEDURE — 80053 COMPREHEN METABOLIC PANEL: CPT | Performed by: EMERGENCY MEDICINE

## 2024-10-12 PROCEDURE — 83735 ASSAY OF MAGNESIUM: CPT | Performed by: EMERGENCY MEDICINE

## 2024-10-12 PROCEDURE — 96374 THER/PROPH/DIAG INJ IV PUSH: CPT

## 2024-10-12 RX ORDER — DIPHENHYDRAMINE HYDROCHLORIDE 50 MG/ML
25 INJECTION INTRAMUSCULAR; INTRAVENOUS ONCE
Status: COMPLETED | OUTPATIENT
Start: 2024-10-12 | End: 2024-10-12

## 2024-10-12 RX ORDER — METOCLOPRAMIDE HYDROCHLORIDE 5 MG/ML
10 INJECTION INTRAMUSCULAR; INTRAVENOUS ONCE
Status: COMPLETED | OUTPATIENT
Start: 2024-10-12 | End: 2024-10-12

## 2024-10-12 RX ADMIN — METOCLOPRAMIDE 10 MG: 5 INJECTION, SOLUTION INTRAMUSCULAR; INTRAVENOUS at 23:22

## 2024-10-12 RX ADMIN — DIPHENHYDRAMINE HYDROCHLORIDE 25 MG: 50 INJECTION, SOLUTION INTRAMUSCULAR; INTRAVENOUS at 23:21

## 2024-10-12 ASSESSMENT — ACTIVITIES OF DAILY LIVING (ADL): ADLS_ACUITY_SCORE: 43

## 2024-10-13 VITALS
WEIGHT: 234 LBS | HEART RATE: 93 BPM | HEIGHT: 62 IN | SYSTOLIC BLOOD PRESSURE: 133 MMHG | RESPIRATION RATE: 18 BRPM | TEMPERATURE: 98.5 F | BODY MASS INDEX: 43.06 KG/M2 | DIASTOLIC BLOOD PRESSURE: 82 MMHG | OXYGEN SATURATION: 98 %

## 2024-10-13 LAB
ATRIAL RATE - MUSE: 88 BPM
DIASTOLIC BLOOD PRESSURE - MUSE: NORMAL MMHG
HOLD SPECIMEN: NORMAL
INTERPRETATION ECG - MUSE: NORMAL
P AXIS - MUSE: 43 DEGREES
PR INTERVAL - MUSE: 146 MS
QRS DURATION - MUSE: 74 MS
QT - MUSE: 336 MS
QTC - MUSE: 406 MS
R AXIS - MUSE: 71 DEGREES
SYSTOLIC BLOOD PRESSURE - MUSE: NORMAL MMHG
T AXIS - MUSE: -6 DEGREES
T4 FREE SERPL-MCNC: 1.38 NG/DL (ref 0.9–1.7)
VENTRICULAR RATE- MUSE: 88 BPM

## 2024-10-13 PROCEDURE — 250N000009 HC RX 250: Performed by: EMERGENCY MEDICINE

## 2024-10-13 PROCEDURE — 250N000011 HC RX IP 250 OP 636: Performed by: EMERGENCY MEDICINE

## 2024-10-13 PROCEDURE — 250N000013 HC RX MED GY IP 250 OP 250 PS 637: Performed by: EMERGENCY MEDICINE

## 2024-10-13 PROCEDURE — 96375 TX/PRO/DX INJ NEW DRUG ADDON: CPT

## 2024-10-13 RX ORDER — ACETAMINOPHEN 500 MG
1000 TABLET ORAL ONCE
Status: COMPLETED | OUTPATIENT
Start: 2024-10-13 | End: 2024-10-13

## 2024-10-13 RX ORDER — LIDOCAINE HYDROCHLORIDE 20 MG/ML
10 SOLUTION OROPHARYNGEAL ONCE
Status: COMPLETED | OUTPATIENT
Start: 2024-10-13 | End: 2024-10-13

## 2024-10-13 RX ORDER — MAGNESIUM HYDROXIDE/ALUMINUM HYDROXICE/SIMETHICONE 120; 1200; 1200 MG/30ML; MG/30ML; MG/30ML
15 SUSPENSION ORAL ONCE
Status: COMPLETED | OUTPATIENT
Start: 2024-10-13 | End: 2024-10-13

## 2024-10-13 RX ADMIN — LIDOCAINE HYDROCHLORIDE 10 ML: 20 SOLUTION ORAL at 00:55

## 2024-10-13 RX ADMIN — ACETAMINOPHEN 1000 MG: 500 TABLET ORAL at 01:06

## 2024-10-13 RX ADMIN — FAMOTIDINE 20 MG: 10 INJECTION, SOLUTION INTRAVENOUS at 00:55

## 2024-10-13 RX ADMIN — ALUMINUM HYDROXIDE, MAGNESIUM HYDROXIDE, AND SIMETHICONE 15 ML: 200; 200; 20 SUSPENSION ORAL at 00:55

## 2024-10-13 ASSESSMENT — ACTIVITIES OF DAILY LIVING (ADL): ADLS_ACUITY_SCORE: 43

## 2024-10-13 NOTE — ED TRIAGE NOTES
BIBA from group home due to chest pain and SOB.  Post oral surgery last Tuesday.  Was seen in  -EKG done and was told that  possible PE but no other test done as aptient already on blood thinner but advised to come in the hospital if symptoms persist.  Was given IM fentanyl and ODT zofran.  H/o behavioral issues,broken ankle on walkie boot since 28th of September.

## 2024-10-13 NOTE — ED NOTES
Bed: ED27  Expected date: 10/12/24  Expected time: 10:10 PM  Means of arrival: Ambulance  Comments:  [facility] 33F s/p oral surg./ substernal CP/fentanyl/zofran

## 2024-10-13 NOTE — ED NOTES
Patient is for discharge and booked her own ride using lyt. Patient was  accompanied to the lobby and with wheelchair. Per patient she has keys to her home. Patient is alert and oriented x4. Denies cp or sob.

## 2024-10-13 NOTE — ED PROVIDER NOTES
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 injection 10 mg (10 mg Intravenous $Given 10/12/24 8012)   diphenhydrAMINE (BENADRYL) injection 25 mg (25 mg Intravenous $Given 10/12/24 7641)   famotidine (PEPCID) injection 20 mg (20 mg Intravenous $Given 10/13/24 0055)   alum & mag  hydroxide-simethicone (MAALOX) suspension 15 mL (15 mLs Oral $Given 10/13/24 0055)   lidocaine (viscous) (XYLOCAINE) 2 % solution 10 mL (10 mLs Mouth/Throat $Given 10/13/24 0055)   acetaminophen (TYLENOL) tablet 1,000 mg (1,000 mg Oral $Given 10/13/24 0106)       Procedures   Procedures     Discussion of Management   None    ED Course   ED Course as of 10/13/24 0337   Sat Oct 12, 2024   2303 I obtained patient history and performed a physical exam.    2312 I called patient's guardian [facility]        Additional Documentation  None    Medical Decision Making / Diagnosis     CMS Diagnoses: None    MIPS       None    MDM   Nevin Alvarado is a 33 year old female with a complex past medical history who presents with chest pain and chest heaviness ongoing for several days.  Patient was seen in outside emergency department yesterday with unremarkable workup.  She presents today doing the ongoing symptoms.  The pain is left-sided and thankfully no rash present to the chest wall.  There is no reproducible chest pain on my evaluation.  EKG shows no concerning ischemic changes nor arrhythmias.  Patient is compliant with her Eliquis and low suspicion for pulmonary embolism.  Patient had a troponin study which returned at 10 and given several days of ongoing symptoms low suspicion for ACS.  Patient had normal renal function, electrolytes, and CBC.  Magnesium normal.  CK obtained and no evidence of rhabdomyolysis given description of bilateral leg myalgias.  Patient also having epigastric tenderness but thankfully lipase within normal range and no concern for pancreatitis.  LFTs are normal.  Patient is not pregnant.  Chest x-ray obtained and shows no evidence of pneumothorax nor pulmonary infiltrate.  Unclear to unifying etiology for the patient's chest discomfort but low suspicion for sinister emergent etiologies that would necessitate further workup or hospitalization.  Encouraged close outpatient follow-up  with primary care for repeat evaluation later this week.  After return precautions understood and all questions answered, discharged back to [facility] home.  I did attempt to get a hold of the patient's guardian while the patient was in the emergency department but was unable to get an answer and did leave a voicemail.    Disposition   The patient was discharged.     Diagnosis     ICD-10-CM    1. Chest pain, unspecified type  R07.9       2. Shortness of breath  R06.02       3. Epigastric pain  R10.13            Discharge Medications   Discharge Medication List as of 10/13/2024  1:22 AM            Scribe Disclosure:  I, Chioma Weaver, am serving as a scribe at 12:24 AM on 10/13/2024 to document services personally performed by Yuniel Robertson MD based on my observations and the provider's statements to me.        Yuniel Robertson MD  10/13/24 0338

## 2024-10-15 ENCOUNTER — OFFICE VISIT (OUTPATIENT)
Dept: ENDOCRINOLOGY | Facility: CLINIC | Age: 34
End: 2024-10-15
Attending: NURSE PRACTITIONER
Payer: COMMERCIAL

## 2024-10-15 VITALS
HEIGHT: 62 IN | DIASTOLIC BLOOD PRESSURE: 89 MMHG | WEIGHT: 238 LBS | HEART RATE: 87 BPM | OXYGEN SATURATION: 97 % | SYSTOLIC BLOOD PRESSURE: 147 MMHG | BODY MASS INDEX: 43.79 KG/M2

## 2024-10-15 DIAGNOSIS — E66.813 CLASS 3 SEVERE OBESITY WITH SERIOUS COMORBIDITY AND BODY MASS INDEX (BMI) OF 50.0 TO 59.9 IN ADULT, UNSPECIFIED OBESITY TYPE (H): Primary | ICD-10-CM

## 2024-10-15 DIAGNOSIS — E11.9 TYPE 2 DIABETES MELLITUS WITHOUT COMPLICATION, WITHOUT LONG-TERM CURRENT USE OF INSULIN (H): ICD-10-CM

## 2024-10-15 DIAGNOSIS — E66.01 CLASS 3 SEVERE OBESITY WITH SERIOUS COMORBIDITY AND BODY MASS INDEX (BMI) OF 50.0 TO 59.9 IN ADULT, UNSPECIFIED OBESITY TYPE (H): Primary | ICD-10-CM

## 2024-10-15 PROCEDURE — 99214 OFFICE O/P EST MOD 30 MIN: CPT | Performed by: NURSE PRACTITIONER

## 2024-10-15 PROCEDURE — G2211 COMPLEX E/M VISIT ADD ON: HCPCS | Performed by: NURSE PRACTITIONER

## 2024-10-15 ASSESSMENT — PAIN SCALES - GENERAL: PAINLEVEL: NO PAIN (0)

## 2024-10-15 NOTE — LETTER
10/15/2024       RE: Nevin Alvarado  [address]  [facility] [address]     Dear Colleague,    Thank you for referring your patient, Nevin Alvarado, to the [facility]. Please see a copy of my visit note below.      Return Medical Weight Management Note     Nevin Alvarado  MRN:  [id number]  :  1990  MOR:  10/15/2024    Dear Latonya Knight MD,    I had the pleasure of seeing your patient Nevin Alvarado. She is a 33 year old female who I am continuing to see for treatment of obesity related to:        2023    12:48 PM   --   I have the following health issues associated with obesity Type II Diabetes    High Blood Pressure    Sleep Apnea    Polycystic Ovarian Syndrome    GERD (Reflux)    Fatty Liver    Asthma    Stress Incontinence       Assessment & Plan  Problem List Items Addressed This Visit          Digestive    Class 3 severe obesity with serious comorbidity and body mass index (BMI) of 50.0 to 59.9 in adult, unspecified obesity type (H) - Primary     Tolerating ozempic 1mg well. Disinterest in food, nausea/ vomiting and constipation have resolved. Appetite okay. Intake has been limited by new dental issues that have been going on for the last several weeks. She should be done now with dental procedures so discussed getting back on track with adequate protein and hydration. Stresed importance of not skipping meals, adequate protein and hydration.     Look into premier protein shakes or protein powders - if not eating- you should be getting protein shakes - 2 minimum   Prescription: 3 eating episodes of protein daily   64oz hydration daily   See Nevin LITTLE  Continue ozempic 1mg   Follow up 3 months            Relevant Medications    Semaglutide, 1 MG/DOSE, (OZEMPIC) 4 MG/3ML pen       Endocrine    Type 2 diabetes mellitus without complication, without  long-term current use of insulin (H)    Relevant Medications    Semaglutide, 1 MG/DOSE, (OZEMPIC) 4 MG/3ML pen          INTERVAL HISTORY:  NBS 9/2023 BMI 56.5 with DMII, ZOHRA, GERD, and hx of PE. Mental health struggles contributing to weight gain including weight gain associated with medications. This was complicated by numerous episodes of swallowing non food items which has lead to perforations of the esophagus and strictures for which she is followed by GI. Had been stable for several months prior to a relapse just before our consult. We discussed mwm to start given increased risks after bariatric surgery to the stomach. Initially switched rybelsus for DMII to wegovy for better efficacy. Due to insurance change she is now taking ozempic.    last seen 7/2024 - reduced ozempic to 1mg due to disinterest in food     Has been having dental issues that are not healing well. Had surgery 1 week ago.   Rolled ankle and broke ankle 4 weeks ago     Anti-obesity medication history    Current:   Ozempic 1mg   - no abd pain  -no nausea/ vomiting   - no heart burn   -mild constipation     Past/Failed/contraindicated:   Failed metformin, rybelsus     Recent diet changes:   Appetite improved with reduced ozempic to 1mg   Recent dental procedures has reduced intake recently   Drinking more water recently   Not meal prepping as often right now with dental issues     Recent exercise/activity changes:   Ankle fracture- activity as tolerated      Vitamins/Labs:   10/2024     CURRENT WEIGHT:   238 lbs 0 oz    Initial Weight (lbs): 309 lbs  Last Visits Weight: 106.1 kg (234 lb)  Cumulative weight loss (lbs): 71  Weight Loss Percentage: 22.98%  Waist Circumference (cm): 126 cm        10/12/2024    10:04 AM   Changes and Difficulties   I have made the following changes to my diet since my last visit: N/a   With regards to my diet, I am still struggling with: Not sure   I have made the following changes to my activity/exercise since my  last visit: Walking more   With regards to my activity/exercise, I am still struggling with: Walking due to broken ankle         MEDICATIONS:   Current Outpatient Medications   Medication Sig Dispense Refill     acetaminophen (TYLENOL) 500 MG tablet Take 2 tablets (1,000 mg) by mouth every 6 hours as needed for mild pain 60 tablet 0     acetaminophen (TYLENOL) 500 MG tablet Take 2 tablets (1,000 mg) by mouth every 6 hours as needed for pain or fever 60 tablet 0     albuterol (PROAIR HFA/PROVENTIL HFA/VENTOLIN HFA) 108 (90 Base) MCG/ACT inhaler Inhale 2 puffs into the lungs every 6 hours as needed for shortness of breath / dyspnea or wheezing 18 g 0     albuterol (PROVENTIL) (2.5 MG/3ML) 0.083% neb solution Take 1 vial (2.5 mg) by nebulization every 6 hours as needed for shortness of breath or wheezing 90 mL 0     Apixaban Starter Pack (ELIQUIS DVT/PE STARTER PACK) 5 MG TBPK Take 2 tablets (10 mg) by mouth twice daily for 7 days then take 1 tablet (5 mg) twice daily thereafter       benzonatate (TESSALON) 100 MG capsule Take 1 capsule (100 mg) by mouth 3 times daily as needed for cough 12 capsule 0     cetirizine (ZYRTEC) 10 MG tablet Take 1 tablet (10 mg) by mouth daily 30 tablet 0     Cholecalciferol (D3 HIGH POTENCY) 25 MCG (1000 UT) CAPS Take 50 mcg by mouth daily       clonazePAM (KLONOPIN) 0.5 MG tablet Take 1 tablet (0.5 mg) by mouth daily as needed for anxiety 7 tablet 0     cyclobenzaprine (FLEXERIL) 10 MG tablet Take 1 tablet (10 mg) by mouth 3 times daily as needed for muscle spasms 20 tablet 0     dicyclomine (BENTYL) 20 MG tablet Take 1 tablet (20 mg) by mouth 4 times daily as needed (abdominal cramps, diarrhea) 12 tablet 0     escitalopram (LEXAPRO) 10 MG tablet Take 10 mg by mouth       famotidine (PEPCID) 20 MG tablet Take 1 tablet (20 mg) by mouth 2 times daily as needed (acid reflux). 20 tablet 0     ferrous sulfate (FEROSUL) 325 (65 Fe) MG tablet Take 1 tablet (325 mg) by mouth daily (with  breakfast) 30 tablet 0     haloperidol (HALDOL) 2 MG tablet Take 1 tablet (2 mg) by mouth 3 times daily as needed (uncontrolled vomiting/abdominal pain) 9 tablet 0     hydrOXYzine HCl (ATARAX) 25 MG tablet Take 25 mg by mouth       insulin pen needle (31G X 8 MM) 31G X 8 MM miscellaneous Use 1 pen needles daily or as directed. 100 each 1     montelukast (SINGULAIR) 10 MG tablet Take 10 mg by mouth every evening       norethindrone (AYGESTIN) 5 MG tablet Take 5 mg by mouth daily       ondansetron (ZOFRAN ODT) 4 MG ODT tab Take 1 tablet (4 mg) by mouth every 8 hours as needed for nausea 15 tablet 0     ondansetron (ZOFRAN-ODT) 4 MG ODT tab Take 1 tablet (4 mg) by mouth every 8 hours as needed for nausea 15 tablet 0     pantoprazole (PROTONIX) 40 MG EC tablet Take 40 mg by mouth daily       polyethylene glycol (MIRALAX) 17 GM/Dose powder Take 17 g by mouth daily as needed for constipation       PSYLLIUM PO Take 1 tsp by mouth daily       Respiratory Therapy Supplies (NEBULIZER) BRENDAN Nebulizer device.  Albuterol nebulization every 2 hours as needed for shortness of breath or wheezing. 1 each 0     Semaglutide, 1 MG/DOSE, (OZEMPIC) 4 MG/3ML pen Inject 1 mg subcutaneously every 7 days. 3 mL 3     Semaglutide, 2 MG/DOSE, (OZEMPIC) 8 MG/3ML pen Inject 2 mg Subcutaneous every 7 days 9 mL 1     valACYclovir (VALTREX) 1000 mg tablet Take 2,000 mg by mouth 2 times daily as needed Take 2 tablets by mouth two times daily for one day. Use as needed at onset of cold sore.             10/12/2024    10:04 AM   Weight Loss Medication History Reviewed With Patient   Which weight loss medications are you currently taking on a regular basis? Ozempic   Are you having any side effects from the weight loss medication that we have prescribed you? No       Admission on 10/12/2024, Discharged on 10/13/2024   Component Date Value Ref Range Status     Ventricular Rate 10/12/2024 88  BPM Final     Atrial Rate 10/12/2024 88  BPM Final     MT  Interval 10/12/2024 146  ms Final     QRS Duration 10/12/2024 74  ms Final     QT 10/12/2024 336  ms Final     QTc 10/12/2024 406  ms Final     P Axis 10/12/2024 43  degrees Final     R AXIS 10/12/2024 71  degrees Final     T Axis 10/12/2024 -6  degrees Final     Interpretation ECG 10/12/2024    Final                    Value:Sinus rhythm  Nonspecific T wave abnormality  Abnormal ECG  When compared with ECG of 26-Aug-2024 17:50,  No significant change was found  Confirmed by GENERATED REPORT, COMPUTER ([id number]),  Anabel Pyle ([id number]) on 10/13/2024 4:43:08 PM       Sodium 10/12/2024 141  135 - 145 mmol/L Final     Potassium 10/12/2024 3.8  3.4 - 5.3 mmol/L Final     Carbon Dioxide (CO2) 10/12/2024 26  22 - 29 mmol/L Final     Anion Gap 10/12/2024 11  7 - 15 mmol/L Final     Urea Nitrogen 10/12/2024 11.5  6.0 - 20.0 mg/dL Final     Creatinine 10/12/2024 0.66  0.51 - 0.95 mg/dL Final     GFR Estimate 10/12/2024 >90  >60 mL/min/1.73m2 Final    eGFR calculated using 2021 CKD-EPI equation.     Calcium 10/12/2024 9.6  8.8 - 10.4 mg/dL Final    Reference intervals for this test were updated on 7/16/2024 to reflect our healthy population more accurately. There may be differences in the flagging of prior results with similar values performed with this method. Those prior results can be interpreted in the context of the updated reference intervals.     Chloride 10/12/2024 104  98 - 107 mmol/L Final     Glucose 10/12/2024 102 (H)  70 - 99 mg/dL Final     Alkaline Phosphatase 10/12/2024 88  40 - 150 U/L Final     AST 10/12/2024 17  0 - 45 U/L Final     ALT 10/12/2024 30  0 - 50 U/L Final     Protein Total 10/12/2024 7.1  6.4 - 8.3 g/dL Final     Albumin 10/12/2024 4.3  3.5 - 5.2 g/dL Final     Bilirubin Total 10/12/2024 0.2  <=1.2 mg/dL Final     Troponin T, High Sensitivity 10/12/2024 10  <=14 ng/L Final    Either a High Sensitivity Troponin T baseline (0 hours) value = 100 ng/L, or an increase in High Sensitivity Troponin  T = 7 ng/L at 2 hours compared to 0 hours (2-0 hours), suggests myocardial injury, and urgent clinical attention is required.    If the 2-0 hours increase is <7 ng/L, a High Sensitivity Troponin T result above gender-specific reference ranges warrants further evaluation.   Recommendations for further evaluation include correlation with clinical decision-making tool (e.g., HEART), a 3rd High Sensitivity Troponin T test 2 hours after the 2nd (a 20% change from baseline would represent concern), admission for observation, close PCC/cardiology follow-up, or urgent outpatient provocative testing.     Magnesium 10/12/2024 2.0  1.7 - 2.3 mg/dL Final     TSH 10/12/2024 4.54 (H)  0.30 - 4.20 uIU/mL Final     hCG Serum Qualitative 10/12/2024 Negative  Negative Final    This test is for screening purposes.  Results should be interpreted along with the clinical picture.  Confirmation testing is available if warranted by ordering [id number], HCG Quantitative Pregnancy.     Hold Specimen 10/12/2024 JIC   Final     Hold Specimen 10/12/2024 JIC   Final     WBC Count 10/12/2024 11.6 (H)  4.0 - 11.0 10e3/uL Final     RBC Count 10/12/2024 4.75  3.80 - 5.20 10e6/uL Final     Hemoglobin 10/12/2024 13.8  11.7 - 15.7 g/dL Final     Hematocrit 10/12/2024 42.3  35.0 - 47.0 % Final     MCV 10/12/2024 89  78 - 100 fL Final     MCH 10/12/2024 29.1  26.5 - 33.0 pg Final     MCHC 10/12/2024 32.6  31.5 - 36.5 g/dL Final     RDW 10/12/2024 13.0  10.0 - 15.0 % Final     Platelet Count 10/12/2024 340  150 - 450 10e3/uL Final     % Neutrophils 10/12/2024 72  % Final     % Lymphocytes 10/12/2024 21  % Final     % Monocytes 10/12/2024 6  % Final     % Eosinophils 10/12/2024 1  % Final     % Basophils 10/12/2024 0  % Final     % Immature Granulocytes 10/12/2024 0  % Final     NRBCs per 100 WBC 10/12/2024 0  <1 /100 Final     Absolute Neutrophils 10/12/2024 8.3  1.6 - 8.3 10e3/uL Final     Absolute Lymphocytes 10/12/2024 2.4  0.8 - 5.3 10e3/uL Final      "Absolute Monocytes 10/12/2024 0.7  0.0 - 1.3 10e3/uL Final     Absolute Eosinophils 10/12/2024 0.1  0.0 - 0.7 10e3/uL Final     Absolute Basophils 10/12/2024 0.1  0.0 - 0.2 10e3/uL Final     Absolute Immature Granulocytes 10/12/2024 0.0  <=0.4 10e3/uL Final     Absolute NRBCs 10/12/2024 0.0  10e3/uL Final     CK 10/12/2024 57  26 - 192 U/L Final     Lipase 10/12/2024 30  13 - 60 U/L Final     Free T4 10/12/2024 1.38  0.90 - 1.70 ng/dL Final           9/13/2023    10:26 AM   BRIAN Score (Last Two)   BRIAN Raw Score 37   Activation Score 79.2   BRIAN Level 4         PHYSICAL EXAM:  Objective   BP (!) 147/89 (BP Location: Left arm, Patient Position: Sitting, Cuff Size: Adult Regular)   Pulse 87   Ht 1.575 m (5' 2\")   Wt 108 kg (238 lb)   SpO2 97%   BMI 43.53 kg/m      BP (!) 147/89 (BP Location: Left arm, Patient Position: Sitting, Cuff Size: Adult Regular)   Pulse 87   Ht 1.575 m (5' 2\")   Wt 108 kg (238 lb)   SpO2 97%   BMI 43.53 kg/m    Body mass index is 43.53 kg/m .  GENERAL: alert and no distress  EYES: Eyes grossly normal to inspection.  No discharge or erythema, or obvious scleral/conjunctival abnormalities.  RESP: No audible wheeze, cough, or visible cyanosis.    SKIN: Visible skin clear. No significant rash, abnormal pigmentation or lesions.  NEURO: Cranial nerves grossly intact.  Mentation and speech appropriate for age.  PSYCH: Appropriate affect, tone, and pace of words        Sincerely,    Sharon Toro NP      32 minutes spent by me on the date of the encounter doing chart review, history and exam, documentation and further activities per the note    The longitudinal plan of care for the diagnosis(es)/condition(s) as documented were addressed during this visit. Due to the added complexity in care, I will continue to support Nevin in the subsequent management and with ongoing continuity of care.      Again, thank you for allowing me to participate in the care of your patient.      Sincerely,    Sharon" Muna, NP
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
## 2024-10-15 NOTE — PROGRESS NOTES
Return Medical Weight Management Note     Nevin Alvarado  MRN:  [id number]  :  1990  MOR:  10/15/2024    Dear Latonya Knight MD,    I had the pleasure of seeing your patient Nevin Alvarado. She is a 33 year old female who I am continuing to see for treatment of obesity related to:        2023    12:48 PM   --   I have the following health issues associated with obesity Type II Diabetes    High Blood Pressure    Sleep Apnea    Polycystic Ovarian Syndrome    GERD (Reflux)    Fatty Liver    Asthma    Stress Incontinence       Assessment & Plan   Problem List Items Addressed This Visit          Digestive    Class 3 severe obesity with serious comorbidity and body mass index (BMI) of 50.0 to 59.9 in adult, unspecified obesity type (H) - Primary     Tolerating ozempic 1mg well. Disinterest in food, nausea/ vomiting and constipation have resolved. Appetite okay. Intake has been limited by new dental issues that have been going on for the last several weeks. She should be done now with dental procedures so discussed getting back on track with adequate protein and hydration. Stresed importance of not skipping meals, adequate protein and hydration.     Look into premier protein shakes or protein powders - if not eating- you should be getting protein shakes - 2 minimum   Prescription: 3 eating episodes of protein daily   64oz hydration daily   See Nevin RD  Continue ozempic 1mg   Follow up 3 months            Relevant Medications    Semaglutide, 1 MG/DOSE, (OZEMPIC) 4 MG/3ML pen       Endocrine    Type 2 diabetes mellitus without complication, without long-term current use of insulin (H)    Relevant Medications    Semaglutide, 1 MG/DOSE, (OZEMPIC) 4 MG/3ML pen          INTERVAL HISTORY:  NBS 2023 BMI 56.5 with DMII, ZOHRA, GERD, and hx of PE. Mental health struggles contributing to weight gain including weight gain associated with medications. This was complicated by numerous episodes of  swallowing non food items which has lead to perforations of the esophagus and strictures for which she is followed by GI. Had been stable for several months prior to a relapse just before our consult. We discussed mwm to start given increased risks after bariatric surgery to the stomach. Initially switched rybelsus for DMII to wegovy for better efficacy. Due to insurance change she is now taking ozempic.    last seen 7/2024 - reduced ozempic to 1mg due to disinterest in food     Has been having dental issues that are not healing well. Had surgery 1 week ago.   Rolled ankle and broke ankle 4 weeks ago     Anti-obesity medication history    Current:   Ozempic 1mg   - no abd pain  -no nausea/ vomiting   - no heart burn   -mild constipation     Past/Failed/contraindicated:   Failed metformin, rybelsus     Recent diet changes:   Appetite improved with reduced ozempic to 1mg   Recent dental procedures has reduced intake recently   Drinking more water recently   Not meal prepping as often right now with dental issues     Recent exercise/activity changes:   Ankle fracture- activity as tolerated      Vitamins/Labs:   10/2024     CURRENT WEIGHT:   238 lbs 0 oz    Initial Weight (lbs): 309 lbs  Last Visits Weight: 106.1 kg (234 lb)  Cumulative weight loss (lbs): 71  Weight Loss Percentage: 22.98%  Waist Circumference (cm): 126 cm        10/12/2024    10:04 AM   Changes and Difficulties   I have made the following changes to my diet since my last visit: N/a   With regards to my diet, I am still struggling with: Not sure   I have made the following changes to my activity/exercise since my last visit: Walking more   With regards to my activity/exercise, I am still struggling with: Walking due to broken ankle         MEDICATIONS:   Current Outpatient Medications   Medication Sig Dispense Refill    acetaminophen (TYLENOL) 500 MG tablet Take 2 tablets (1,000 mg) by mouth every 6 hours as needed for mild pain 60 tablet 0     acetaminophen (TYLENOL) 500 MG tablet Take 2 tablets (1,000 mg) by mouth every 6 hours as needed for pain or fever 60 tablet 0    albuterol (PROAIR HFA/PROVENTIL HFA/VENTOLIN HFA) 108 (90 Base) MCG/ACT inhaler Inhale 2 puffs into the lungs every 6 hours as needed for shortness of breath / dyspnea or wheezing 18 g 0    albuterol (PROVENTIL) (2.5 MG/3ML) 0.083% neb solution Take 1 vial (2.5 mg) by nebulization every 6 hours as needed for shortness of breath or wheezing 90 mL 0    Apixaban Starter Pack (ELIQUIS DVT/PE STARTER PACK) 5 MG TBPK Take 2 tablets (10 mg) by mouth twice daily for 7 days then take 1 tablet (5 mg) twice daily thereafter      benzonatate (TESSALON) 100 MG capsule Take 1 capsule (100 mg) by mouth 3 times daily as needed for cough 12 capsule 0    cetirizine (ZYRTEC) 10 MG tablet Take 1 tablet (10 mg) by mouth daily 30 tablet 0    Cholecalciferol (D3 HIGH POTENCY) 25 MCG (1000 UT) CAPS Take 50 mcg by mouth daily      clonazePAM (KLONOPIN) 0.5 MG tablet Take 1 tablet (0.5 mg) by mouth daily as needed for anxiety 7 tablet 0    cyclobenzaprine (FLEXERIL) 10 MG tablet Take 1 tablet (10 mg) by mouth 3 times daily as needed for muscle spasms 20 tablet 0    dicyclomine (BENTYL) 20 MG tablet Take 1 tablet (20 mg) by mouth 4 times daily as needed (abdominal cramps, diarrhea) 12 tablet 0    escitalopram (LEXAPRO) 10 MG tablet Take 10 mg by mouth      famotidine (PEPCID) 20 MG tablet Take 1 tablet (20 mg) by mouth 2 times daily as needed (acid reflux). 20 tablet 0    ferrous sulfate (FEROSUL) 325 (65 Fe) MG tablet Take 1 tablet (325 mg) by mouth daily (with breakfast) 30 tablet 0    haloperidol (HALDOL) 2 MG tablet Take 1 tablet (2 mg) by mouth 3 times daily as needed (uncontrolled vomiting/abdominal pain) 9 tablet 0    hydrOXYzine HCl (ATARAX) 25 MG tablet Take 25 mg by mouth      insulin pen needle (31G X 8 MM) 31G X 8 MM miscellaneous Use 1 pen needles daily or as directed. 100 each 1    montelukast  (SINGULAIR) 10 MG tablet Take 10 mg by mouth every evening      norethindrone (AYGESTIN) 5 MG tablet Take 5 mg by mouth daily      ondansetron (ZOFRAN ODT) 4 MG ODT tab Take 1 tablet (4 mg) by mouth every 8 hours as needed for nausea 15 tablet 0    ondansetron (ZOFRAN-ODT) 4 MG ODT tab Take 1 tablet (4 mg) by mouth every 8 hours as needed for nausea 15 tablet 0    pantoprazole (PROTONIX) 40 MG EC tablet Take 40 mg by mouth daily      polyethylene glycol (MIRALAX) 17 GM/Dose powder Take 17 g by mouth daily as needed for constipation      PSYLLIUM PO Take 1 tsp by mouth daily      Respiratory Therapy Supplies (NEBULIZER) BRENDAN Nebulizer device.  Albuterol nebulization every 2 hours as needed for shortness of breath or wheezing. 1 each 0    Semaglutide, 1 MG/DOSE, (OZEMPIC) 4 MG/3ML pen Inject 1 mg subcutaneously every 7 days. 3 mL 3    Semaglutide, 2 MG/DOSE, (OZEMPIC) 8 MG/3ML pen Inject 2 mg Subcutaneous every 7 days 9 mL 1    valACYclovir (VALTREX) 1000 mg tablet Take 2,000 mg by mouth 2 times daily as needed Take 2 tablets by mouth two times daily for one day. Use as needed at onset of cold sore.             10/12/2024    10:04 AM   Weight Loss Medication History Reviewed With Patient   Which weight loss medications are you currently taking on a regular basis? Ozempic   Are you having any side effects from the weight loss medication that we have prescribed you? No       Admission on 10/12/2024, Discharged on 10/13/2024   Component Date Value Ref Range Status    Ventricular Rate 10/12/2024 88  BPM Final    Atrial Rate 10/12/2024 88  BPM Final    OH Interval 10/12/2024 146  ms Final    QRS Duration 10/12/2024 74  ms Final    QT 10/12/2024 336  ms Final    QTc 10/12/2024 406  ms Final    P Axis 10/12/2024 43  degrees Final    R AXIS 10/12/2024 71  degrees Final    T Axis 10/12/2024 -6  degrees Final    Interpretation ECG 10/12/2024    Final                    Value:Sinus rhythm  Nonspecific T wave  abnormality  Abnormal ECG  When compared with ECG of 26-Aug-2024 17:50,  No significant change was found  Confirmed by GENERATED REPORT, COMPUTER ([id number]),  Anabel Pyle ([id number]) on 10/13/2024 4:43:08 PM      Sodium 10/12/2024 141  135 - 145 mmol/L Final    Potassium 10/12/2024 3.8  3.4 - 5.3 mmol/L Final    Carbon Dioxide (CO2) 10/12/2024 26  22 - 29 mmol/L Final    Anion Gap 10/12/2024 11  7 - 15 mmol/L Final    Urea Nitrogen 10/12/2024 11.5  6.0 - 20.0 mg/dL Final    Creatinine 10/12/2024 0.66  0.51 - 0.95 mg/dL Final    GFR Estimate 10/12/2024 >90  >60 mL/min/1.73m2 Final    eGFR calculated using 2021 CKD-EPI equation.    Calcium 10/12/2024 9.6  8.8 - 10.4 mg/dL Final    Reference intervals for this test were updated on 7/16/2024 to reflect our healthy population more accurately. There may be differences in the flagging of prior results with similar values performed with this method. Those prior results can be interpreted in the context of the updated reference intervals.    Chloride 10/12/2024 104  98 - 107 mmol/L Final    Glucose 10/12/2024 102 (H)  70 - 99 mg/dL Final    Alkaline Phosphatase 10/12/2024 88  40 - 150 U/L Final    AST 10/12/2024 17  0 - 45 U/L Final    ALT 10/12/2024 30  0 - 50 U/L Final    Protein Total 10/12/2024 7.1  6.4 - 8.3 g/dL Final    Albumin 10/12/2024 4.3  3.5 - 5.2 g/dL Final    Bilirubin Total 10/12/2024 0.2  <=1.2 mg/dL Final    Troponin T, High Sensitivity 10/12/2024 10  <=14 ng/L Final    Either a High Sensitivity Troponin T baseline (0 hours) value = 100 ng/L, or an increase in High Sensitivity Troponin T = 7 ng/L at 2 hours compared to 0 hours (2-0 hours), suggests myocardial injury, and urgent clinical attention is required.    If the 2-0 hours increase is <7 ng/L, a High Sensitivity Troponin T result above gender-specific reference ranges warrants further evaluation.   Recommendations for further evaluation include correlation with clinical decision-making tool (e.g.,  HEART), a 3rd High Sensitivity Troponin T test 2 hours after the 2nd (a 20% change from baseline would represent concern), admission for observation, close PCC/cardiology follow-up, or urgent outpatient provocative testing.    Magnesium 10/12/2024 2.0  1.7 - 2.3 mg/dL Final    TSH 10/12/2024 4.54 (H)  0.30 - 4.20 uIU/mL Final    hCG Serum Qualitative 10/12/2024 Negative  Negative Final    This test is for screening purposes.  Results should be interpreted along with the clinical picture.  Confirmation testing is available if warranted by ordering [id number], HCG Quantitative Pregnancy.    Hold Specimen 10/12/2024 JIC   Final    Hold Specimen 10/12/2024 JIC   Final    WBC Count 10/12/2024 11.6 (H)  4.0 - 11.0 10e3/uL Final    RBC Count 10/12/2024 4.75  3.80 - 5.20 10e6/uL Final    Hemoglobin 10/12/2024 13.8  11.7 - 15.7 g/dL Final    Hematocrit 10/12/2024 42.3  35.0 - 47.0 % Final    MCV 10/12/2024 89  78 - 100 fL Final    MCH 10/12/2024 29.1  26.5 - 33.0 pg Final    MCHC 10/12/2024 32.6  31.5 - 36.5 g/dL Final    RDW 10/12/2024 13.0  10.0 - 15.0 % Final    Platelet Count 10/12/2024 340  150 - 450 10e3/uL Final    % Neutrophils 10/12/2024 72  % Final    % Lymphocytes 10/12/2024 21  % Final    % Monocytes 10/12/2024 6  % Final    % Eosinophils 10/12/2024 1  % Final    % Basophils 10/12/2024 0  % Final    % Immature Granulocytes 10/12/2024 0  % Final    NRBCs per 100 WBC 10/12/2024 0  <1 /100 Final    Absolute Neutrophils 10/12/2024 8.3  1.6 - 8.3 10e3/uL Final    Absolute Lymphocytes 10/12/2024 2.4  0.8 - 5.3 10e3/uL Final    Absolute Monocytes 10/12/2024 0.7  0.0 - 1.3 10e3/uL Final    Absolute Eosinophils 10/12/2024 0.1  0.0 - 0.7 10e3/uL Final    Absolute Basophils 10/12/2024 0.1  0.0 - 0.2 10e3/uL Final    Absolute Immature Granulocytes 10/12/2024 0.0  <=0.4 10e3/uL Final    Absolute NRBCs 10/12/2024 0.0  10e3/uL Final    CK 10/12/2024 57  26 - 192 U/L Final    Lipase 10/12/2024 30  13 - 60 U/L Final    Free T4  "10/12/2024 1.38  0.90 - 1.70 ng/dL Final           9/13/2023    10:26 AM   BRIAN Score (Last Two)   BRIAN Raw Score 37   Activation Score 79.2   BRIAN Level 4         PHYSICAL EXAM:  Objective    BP (!) 147/89 (BP Location: Left arm, Patient Position: Sitting, Cuff Size: Adult Regular)   Pulse 87   Ht 1.575 m (5' 2\")   Wt 108 kg (238 lb)   SpO2 97%   BMI 43.53 kg/m      BP (!) 147/89 (BP Location: Left arm, Patient Position: Sitting, Cuff Size: Adult Regular)   Pulse 87   Ht 1.575 m (5' 2\")   Wt 108 kg (238 lb)   SpO2 97%   BMI 43.53 kg/m    Body mass index is 43.53 kg/m .  GENERAL: alert and no distress  EYES: Eyes grossly normal to inspection.  No discharge or erythema, or obvious scleral/conjunctival abnormalities.  RESP: No audible wheeze, cough, or visible cyanosis.    SKIN: Visible skin clear. No significant rash, abnormal pigmentation or lesions.  NEURO: Cranial nerves grossly intact.  Mentation and speech appropriate for age.  PSYCH: Appropriate affect, tone, and pace of words        Sincerely,    Sharon Toro NP      32 minutes spent by me on the date of the encounter doing chart review, history and exam, documentation and further activities per the note    The longitudinal plan of care for the diagnosis(es)/condition(s) as documented were addressed during this visit. Due to the added complexity in care, I will continue to support Nevin in the subsequent management and with ongoing continuity of care.  "

## 2024-10-15 NOTE — PATIENT INSTRUCTIONS
Look into premier protein shakes or protein powders - if not eating- you should be getting protein shakes - 2 minimum     Prescription: 3 eating episodes of protein daily     64oz hydration daily     See Nevin     Continue ozempic 1mg     Follow up 3 months

## 2024-10-16 NOTE — ASSESSMENT & PLAN NOTE
Tolerating ozempic 1mg well. Disinterest in food, nausea/ vomiting and constipation have resolved. Appetite okay. Intake has been limited by new dental issues that have been going on for the last several weeks. She should be done now with dental procedures so discussed getting back on track with adequate protein and hydration. Stresed importance of not skipping meals, adequate protein and hydration.     Look into premier protein shakes or protein powders - if not eating- you should be getting protein shakes - 2 minimum   Prescription: 3 eating episodes of protein daily   64oz hydration daily   See Nevin LITTLE  Continue ozempic 1mg   Follow up 3 months

## 2024-10-26 ENCOUNTER — HEALTH MAINTENANCE LETTER (OUTPATIENT)
Age: 34
End: 2024-10-26

## 2024-11-11 ENCOUNTER — APPOINTMENT (OUTPATIENT)
Dept: ULTRASOUND IMAGING | Facility: CLINIC | Age: 34
End: 2024-11-11
Attending: EMERGENCY MEDICINE
Payer: COMMERCIAL

## 2024-11-11 ENCOUNTER — HOSPITAL ENCOUNTER (EMERGENCY)
Facility: CLINIC | Age: 34
Discharge: HOME | End: 2024-11-11
Attending: EMERGENCY MEDICINE | Admitting: EMERGENCY MEDICINE
Payer: COMMERCIAL

## 2024-11-11 VITALS
TEMPERATURE: 98.4 F | HEART RATE: 99 BPM | OXYGEN SATURATION: 96 % | RESPIRATION RATE: 22 BRPM | BODY MASS INDEX: 43.79 KG/M2 | HEIGHT: 62 IN | DIASTOLIC BLOOD PRESSURE: 78 MMHG | SYSTOLIC BLOOD PRESSURE: 176 MMHG | WEIGHT: 238 LBS

## 2024-11-11 DIAGNOSIS — R10.32 LLQ ABDOMINAL PAIN: ICD-10-CM

## 2024-11-11 DIAGNOSIS — N39.0 URINARY TRACT INFECTION WITHOUT HEMATURIA, SITE UNSPECIFIED: ICD-10-CM

## 2024-11-11 DIAGNOSIS — R82.71 BACTERIURIA: ICD-10-CM

## 2024-11-11 DIAGNOSIS — Z79.01 ANTICOAGULATED BY ANTICOAGULATION TREATMENT: ICD-10-CM

## 2024-11-11 LAB
ALBUMIN SERPL BCG-MCNC: 4.5 G/DL (ref 3.5–5.2)
ALBUMIN UR-MCNC: NEGATIVE MG/DL
ALP SERPL-CCNC: 81 U/L (ref 40–150)
ALT SERPL W P-5'-P-CCNC: 68 U/L (ref 0–50)
ANION GAP SERPL CALCULATED.3IONS-SCNC: 17 MMOL/L (ref 7–15)
APPEARANCE UR: CLEAR
AST SERPL W P-5'-P-CCNC: 28 U/L (ref 0–45)
BACTERIA #/AREA URNS HPF: ABNORMAL /HPF
BASOPHILS # BLD AUTO: 0 10E3/UL (ref 0–0.2)
BASOPHILS NFR BLD AUTO: 0 %
BILIRUB DIRECT SERPL-MCNC: <0.2 MG/DL (ref 0–0.3)
BILIRUB SERPL-MCNC: 0.3 MG/DL
BILIRUB UR QL STRIP: NEGATIVE
BUN SERPL-MCNC: 9.6 MG/DL (ref 6–20)
CALCIUM SERPL-MCNC: 9.7 MG/DL (ref 8.8–10.4)
CHLORIDE SERPL-SCNC: 102 MMOL/L (ref 98–107)
COLOR UR AUTO: COLORLESS
CREAT SERPL-MCNC: 0.61 MG/DL (ref 0.51–0.95)
CRP SERPL-MCNC: 6.73 MG/L
EGFRCR SERPLBLD CKD-EPI 2021: >90 ML/MIN/1.73M2
EOSINOPHIL # BLD AUTO: 0.1 10E3/UL (ref 0–0.7)
EOSINOPHIL NFR BLD AUTO: 1 %
ERYTHROCYTE [DISTWIDTH] IN BLOOD BY AUTOMATED COUNT: 13.2 % (ref 10–15)
GLUCOSE SERPL-MCNC: 102 MG/DL (ref 70–99)
GLUCOSE UR STRIP-MCNC: NEGATIVE MG/DL
HCG UR QL: NEGATIVE
HCO3 SERPL-SCNC: 23 MMOL/L (ref 22–29)
HCT VFR BLD AUTO: 44.9 % (ref 35–47)
HGB BLD-MCNC: 15.3 G/DL (ref 11.7–15.7)
HGB UR QL STRIP: NEGATIVE
IMM GRANULOCYTES # BLD: 0 10E3/UL
IMM GRANULOCYTES NFR BLD: 0 %
KETONES UR STRIP-MCNC: NEGATIVE MG/DL
LEUKOCYTE ESTERASE UR QL STRIP: NEGATIVE
LIPASE SERPL-CCNC: 40 U/L (ref 13–60)
LYMPHOCYTES # BLD AUTO: 2.8 10E3/UL (ref 0.8–5.3)
LYMPHOCYTES NFR BLD AUTO: 29 %
MAGNESIUM SERPL-MCNC: 2.1 MG/DL (ref 1.7–2.3)
MCH RBC QN AUTO: 29.9 PG (ref 26.5–33)
MCHC RBC AUTO-ENTMCNC: 34.1 G/DL (ref 31.5–36.5)
MCV RBC AUTO: 88 FL (ref 78–100)
MONOCYTES # BLD AUTO: 0.5 10E3/UL (ref 0–1.3)
MONOCYTES NFR BLD AUTO: 5 %
NEUTROPHILS # BLD AUTO: 6.2 10E3/UL (ref 1.6–8.3)
NEUTROPHILS NFR BLD AUTO: 65 %
NITRATE UR QL: NEGATIVE
NRBC # BLD AUTO: 0 10E3/UL
NRBC BLD AUTO-RTO: 0 /100
PH UR STRIP: 7 [PH] (ref 5–7)
PLATELET # BLD AUTO: 291 10E3/UL (ref 150–450)
POTASSIUM SERPL-SCNC: 3.7 MMOL/L (ref 3.4–5.3)
PROCALCITONIN SERPL IA-MCNC: 0.02 NG/ML
PROT SERPL-MCNC: 7.3 G/DL (ref 6.4–8.3)
RBC # BLD AUTO: 5.11 10E6/UL (ref 3.8–5.2)
RBC URINE: <1 /HPF
SODIUM SERPL-SCNC: 142 MMOL/L (ref 135–145)
SP GR UR STRIP: 1 (ref 1–1.03)
SQUAMOUS EPITHELIAL: 1 /HPF
UROBILINOGEN UR STRIP-MCNC: <2 MG/DL
WBC # BLD AUTO: 9.6 10E3/UL (ref 4–11)
WBC URINE: <1 /HPF

## 2024-11-11 PROCEDURE — 99285 EMERGENCY DEPT VISIT HI MDM: CPT | Mod: 25

## 2024-11-11 PROCEDURE — 86140 C-REACTIVE PROTEIN: CPT | Performed by: EMERGENCY MEDICINE

## 2024-11-11 PROCEDURE — 84145 PROCALCITONIN (PCT): CPT | Performed by: EMERGENCY MEDICINE

## 2024-11-11 PROCEDURE — 96375 TX/PRO/DX INJ NEW DRUG ADDON: CPT

## 2024-11-11 PROCEDURE — 96376 TX/PRO/DX INJ SAME DRUG ADON: CPT

## 2024-11-11 PROCEDURE — 250N000013 HC RX MED GY IP 250 OP 250 PS 637: Performed by: EMERGENCY MEDICINE

## 2024-11-11 PROCEDURE — 83735 ASSAY OF MAGNESIUM: CPT | Performed by: EMERGENCY MEDICINE

## 2024-11-11 PROCEDURE — 84155 ASSAY OF PROTEIN SERUM: CPT | Performed by: EMERGENCY MEDICINE

## 2024-11-11 PROCEDURE — 82248 BILIRUBIN DIRECT: CPT | Performed by: EMERGENCY MEDICINE

## 2024-11-11 PROCEDURE — 81003 URINALYSIS AUTO W/O SCOPE: CPT | Performed by: EMERGENCY MEDICINE

## 2024-11-11 PROCEDURE — 82947 ASSAY GLUCOSE BLOOD QUANT: CPT | Performed by: EMERGENCY MEDICINE

## 2024-11-11 PROCEDURE — 87086 URINE CULTURE/COLONY COUNT: CPT | Performed by: EMERGENCY MEDICINE

## 2024-11-11 PROCEDURE — 250N000011 HC RX IP 250 OP 636: Performed by: EMERGENCY MEDICINE

## 2024-11-11 PROCEDURE — 36415 COLL VENOUS BLD VENIPUNCTURE: CPT | Performed by: EMERGENCY MEDICINE

## 2024-11-11 PROCEDURE — 85004 AUTOMATED DIFF WBC COUNT: CPT | Performed by: EMERGENCY MEDICINE

## 2024-11-11 PROCEDURE — 93976 VASCULAR STUDY: CPT

## 2024-11-11 PROCEDURE — 81025 URINE PREGNANCY TEST: CPT | Performed by: EMERGENCY MEDICINE

## 2024-11-11 PROCEDURE — 80048 BASIC METABOLIC PNL TOTAL CA: CPT | Performed by: EMERGENCY MEDICINE

## 2024-11-11 PROCEDURE — 83690 ASSAY OF LIPASE: CPT | Performed by: EMERGENCY MEDICINE

## 2024-11-11 PROCEDURE — 96374 THER/PROPH/DIAG INJ IV PUSH: CPT

## 2024-11-11 RX ORDER — DROPERIDOL 2.5 MG/ML
1.25 INJECTION, SOLUTION INTRAMUSCULAR; INTRAVENOUS ONCE
Status: COMPLETED | OUTPATIENT
Start: 2024-11-11 | End: 2024-11-11

## 2024-11-11 RX ORDER — LEVOFLOXACIN 750 MG/1
750 TABLET, FILM COATED ORAL ONCE
Status: COMPLETED | OUTPATIENT
Start: 2024-11-11 | End: 2024-11-11

## 2024-11-11 RX ORDER — DICYCLOMINE HYDROCHLORIDE 10 MG/1
20 CAPSULE ORAL ONCE
Status: COMPLETED | OUTPATIENT
Start: 2024-11-11 | End: 2024-11-11

## 2024-11-11 RX ORDER — OXYBUTYNIN CHLORIDE 5 MG/1
5 TABLET, EXTENDED RELEASE ORAL ONCE
Status: COMPLETED | OUTPATIENT
Start: 2024-11-11 | End: 2024-11-11

## 2024-11-11 RX ORDER — ONDANSETRON 2 MG/ML
4 INJECTION INTRAMUSCULAR; INTRAVENOUS ONCE
Status: COMPLETED | OUTPATIENT
Start: 2024-11-11 | End: 2024-11-11

## 2024-11-11 RX ORDER — FENTANYL CITRATE 50 UG/ML
50 INJECTION, SOLUTION INTRAMUSCULAR; INTRAVENOUS ONCE
Status: COMPLETED | OUTPATIENT
Start: 2024-11-11 | End: 2024-11-11

## 2024-11-11 RX ORDER — LEVOFLOXACIN 750 MG/1
750 TABLET, FILM COATED ORAL DAILY
Qty: 6 TABLET | Refills: 0 | Status: SHIPPED | OUTPATIENT
Start: 2024-11-12 | End: 2024-11-18

## 2024-11-11 RX ADMIN — ONDANSETRON 4 MG: 2 INJECTION, SOLUTION INTRAMUSCULAR; INTRAVENOUS at 19:37

## 2024-11-11 RX ADMIN — FENTANYL CITRATE 50 MCG: 50 INJECTION INTRAMUSCULAR; INTRAVENOUS at 15:47

## 2024-11-11 RX ADMIN — HYDROMORPHONE HYDROCHLORIDE 1 MG: 1 INJECTION, SOLUTION INTRAMUSCULAR; INTRAVENOUS; SUBCUTANEOUS at 17:14

## 2024-11-11 RX ADMIN — DICYCLOMINE HYDROCHLORIDE 20 MG: 10 CAPSULE ORAL at 17:16

## 2024-11-11 RX ADMIN — ONDANSETRON 4 MG: 2 INJECTION, SOLUTION INTRAMUSCULAR; INTRAVENOUS at 15:46

## 2024-11-11 RX ADMIN — LEVOFLOXACIN 750 MG: 750 TABLET, FILM COATED ORAL at 15:48

## 2024-11-11 RX ADMIN — OXYBUTYNIN CHLORIDE 5 MG: 5 TABLET, EXTENDED RELEASE ORAL at 15:48

## 2024-11-11 ASSESSMENT — ACTIVITIES OF DAILY LIVING (ADL)
ADLS_ACUITY_SCORE: 21.25

## 2024-11-11 NOTE — ED PROVIDER NOTES
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 ESOPHAGOGASTRODUODENOSCOPY, WITH FOREIGN BODY REMOVAL;  Surgeon: Kash Griffin MD;  Location:  GI    ESOPHAGOSCOPY, GASTROSCOPY, DUODENOSCOPY (EGD), COMBINED N/A 10/23/2022    Procedure: ESOPHAGOGASTRODUODENOSCOPY (EGD) FOR REMOVAL OF FOREIGN BODY;  Surgeon: Barney Pinto MD;  Location: [facility] OR    ESOPHAGOSCOPY, GASTROSCOPY, DUODENOSCOPY (EGD), COMBINED N/A 11/3/2022    Procedure: ESOPHAGOGASTRODUODENOSCOPY (EGD) for foreign body removal;  Surgeon: Cruz Kumar MD;  Location: [facility] OR    ESOPHAGOSCOPY, GASTROSCOPY, DUODENOSCOPY (EGD), COMBINED N/A 11/29/2022    Procedure: ESOPHAGOGASTRODUODENOSCOPY (EGD);  Surgeon: Cristino Kelsey MD, MD;  Location: [facility] OR    ESOPHAGOSCOPY, GASTROSCOPY, DUODENOSCOPY (EGD), COMBINED N/A 12/8/2022    Procedure: ESOPHAGOGASTRODUODENOSCOPY (EGD) with foreign body removal;  Surgeon:  Efrem Begum MD;  Location: [facility] OR    ESOPHAGOSCOPY, GASTROSCOPY, DUODENOSCOPY (EGD), COMBINED N/A 12/28/2022    Procedure: ESOPHAGOGASTRODUODENOSCOPY, WITH FOREIGN BODY REMOVAL;  Surgeon: Doug Hansen MD;  Location:  GI    ESOPHAGOSCOPY, GASTROSCOPY, DUODENOSCOPY (EGD), COMBINED N/A 1/20/2023    Procedure: ESOPHAGOGASTRODUODENOSCOPY (EGD);  Surgeon: Bety Nova MD;  Location:  GI    ESOPHAGOSCOPY, GASTROSCOPY, DUODENOSCOPY (EGD), COMBINED N/A 3/11/2023    Procedure: ESOPHAGOGASTRODUODENOSCOPY WITH FOREIGN BODY REMOVAL;  Surgeon: Cruz Kumar MD;  Location: [facility] OR    ESOPHAGOSCOPY, GASTROSCOPY, DUODENOSCOPY (EGD), COMBINED N/A 10/16/2023    Procedure: ESOPHAGOGASTRODUODENOSCOPY (EGD) WITH FOREIGN BODY REMOVAL;  Surgeon: Cruz Kumar MD;  Location: [facility] OR    ESOPHAGOSCOPY, GASTROSCOPY, DUODENOSCOPY (EGD), COMBINED N/A 10/29/2023    Procedure: ESOPHAGOGASTRODUODENOSCOPY, WITH FOREIGN BODY REMOVAL;  Surgeon: Kash Griffin MD;  Location:  GI    ESOPHAGOSCOPY, GASTROSCOPY, DUODENOSCOPY (EGD), COMBINED N/A 3/29/2024    Procedure: ESOPHAGOGASTRODUODENOSCOPY WITH BIOSPIES;  Surgeon: Gustavo Mathew MD;  Location: [facility] OR    ESOPHAGOSCOPY, GASTROSCOPY, DUODENOSCOPY (EGD), DILATATION, COMBINED N/A 8/30/2021    Procedure: ESOPHAGOGASTRODUODENOSCOPY, WITH DILATION (mngi);  Surgeon: Pat Cervantes MD;  Location:  OR    EXAM UNDER ANESTHESIA ANUS N/A 1/10/2017    Procedure: EXAM UNDER ANESTHESIA ANUS;  Surgeon: Annmarie Haynes MD;  Location: [facility] OR    EXAM UNDER ANESTHESIA RECTUM N/A 7/19/2018    Procedure: EXAM UNDER ANESTHESIA RECTUM;  EXAM UNDER ANESTHESIA, REMOVAL OF RECTAL FOREIGN BODY;  Surgeon: Annmarie Haynes MD;  Location: [facility] OR    HC REMOVE FECAL IMPACTION OR FB W ANESTHESIA N/A 12/18/2016    Procedure: REMOVE FECAL IMPACTION/FOREIGN BODY UNDER ANESTHESIA;  Surgeon: Soham Cano MD;   Location: [facility] OR    IR CVC TUNNEL PLACEMENT > 5 YRS OF AGE  4/2/2024    IR CVC TUNNEL REMOVAL RIGHT  4/16/2024    IR LUMBAR PUNCTURE  8/14/2024    KNEE SURGERY Right     KNEE SURGERY - removed a small tissue mass from knee Right     LAPAROSCOPIC ABLATION ENDOMETRIOSIS      LAPAROTOMY EXPLORATORY N/A 1/10/2017    Procedure: LAPAROTOMY EXPLORATORY;  Surgeon: Annmarie Haynes MD;  Location: [facility] OR    LAPAROTOMY EXPLORATORY  09/11/2019    Manual manipulation of bowel to remove pill bottle in rectum    lymph nodes removed from neck; benign  age 7    MAMMOPLASTY REDUCTION Bilateral     OTHER SURGICAL HISTORY      foreign body anus removal    PICC DOUBLE LUMEN PLACEMENT  4/25/2024    OK ESOPHAGOGASTRODUODENOSCOPY TRANSORAL DIAGNOSTIC N/A 1/5/2019    Procedure: ESOPHAGOGASTRODUODENOSCOPY (EGD) with foreign body removal using raptor;  Surgeon: Lila Sol MD;  Location: [facility];  Service: Gastroenterology    OK ESOPHAGOGASTRODUODENOSCOPY TRANSORAL DIAGNOSTIC N/A 1/25/2019    Procedure: ESOPHAGOGASTRODUODENOSCOPY (EGD) removal of foreign body;  Surgeon: Binu Vigil MD;  Location: [facility];  Service: Gastroenterology    OK ESOPHAGOGASTRODUODENOSCOPY TRANSORAL DIAGNOSTIC N/A 1/31/2019    Procedure: ESOPHAGOGASTRODUODENOSCOPY (EGD);  Surgeon: Siddharth Spears MD;  Location: [facility];  Service: Gastroenterology    OK ESOPHAGOGASTRODUODENOSCOPY TRANSORAL DIAGNOSTIC N/A 8/17/2019    Procedure: ESOPHAGOGASTRODUODENOSCOPY (EGD) with foreign body removal;  Surgeon: Darek Lucero MD;  Location: [facility];  Service: Gastroenterology    OK ESOPHAGOGASTRODUODENOSCOPY TRANSORAL DIAGNOSTIC N/A 9/29/2019    Procedure: ESOPHAGOGASTRODUODENOSCOPY (EGD) with foreign body removal;  Surgeon: Bailey Arnold MD;  Location: [facility];  Service: Gastroenterology    OK ESOPHAGOGASTRODUODENOSCOPY TRANSORAL DIAGNOSTIC N/A 10/3/2019    Procedure:  ESOPHAGOGASTRODUODENOSCOPY (EGD), REMOVAL OF FOREIGN BODY;  Surgeon: Chris Lira MD;  Location: [facility] OR;  Service: Gastroenterology    IA ESOPHAGOGASTRODUODENOSCOPY TRANSORAL DIAGNOSTIC N/A 10/6/2019    Procedure: ESOPHAGOGASTRODUODENOSCOPY (EGD) with attempted foreign body removal;  Surgeon: Felipe Connolly MD;  Location: [facility];  Service: Gastroenterology    IA ESOPHAGOGASTRODUODENOSCOPY TRANSORAL DIAGNOSTIC N/A 12/15/2019    Procedure: ESOPHAGOGASTRODUODENOSCOPY (EGD) with foreign body removal;  Surgeon: Jeffy Zuñiga MD;  Location: [facility];  Service: Gastroenterology    IA ESOPHAGOGASTRODUODENOSCOPY TRANSORAL DIAGNOSTIC N/A 12/17/2019    Procedure: ESOPHAGOGASTRODUODENOSCOPY (EGD) with attempted foreign body removal;  Surgeon: Felipe Connolly MD;  Location: [facility];  Service: Gastroenterology    IA ESOPHAGOGASTRODUODENOSCOPY TRANSORAL DIAGNOSTIC N/A 12/21/2019    Procedure: ESOPHAGOGASTRODUODENOSCOPY (EGD) FOR FROEIGN BODY REMOVAL;  Surgeon: Binu Vigil MD;  Location: [facility] OR;  Service: Gastroenterology    IA ESOPHAGOGASTRODUODENOSCOPY TRANSORAL DIAGNOSTIC N/A 7/22/2020    Procedure: ESOPHAGOGASTRODUODENOSCOPY (EGD);  Surgeon: Bailey Arnold MD;  Location: [facility] OR;  Service: Gastroenterology    IA ESOPHAGOGASTRODUODENOSCOPY TRANSORAL DIAGNOSTIC N/A 8/14/2020    Procedure: ESOPHAGOGASTRODUODENOSCOPY (EGD) FOREIGN BODY REMOVAL;  Surgeon: Jeffy Zuñiga MD;  Location: [facility] OR;  Service: Gastroenterology    IA ESOPHAGOGASTRODUODENOSCOPY TRANSORAL DIAGNOSTIC N/A 2/25/2021    Procedure: ESOPHAGOGASTRODUODENOSCOPY (EGD) with foreign body reoval;  Surgeon: Bird Sethi MD;  Location: [facility];  Service: Gastroenterology    IA ESOPHAGOGASTRODUODENOSCOPY TRANSORAL DIAGNOSTIC N/A 4/19/2021    Procedure: ESOPHAGOGASTRODUODENOSCOPY (EGD);  Surgeon: Libia Rose MD;  Location: [facility];  Service:  Gastroenterology    RI SURG DIAGNOSTIC EXAM, ANORECTAL N/A 2/5/2020    Procedure: EXAM UNDER ANESTHESIA, Flexible Sigmoidoscopy, Retrieval of Foreign Body;  Surgeon: Sasha Ivan MD;  Location: [facility];  Service: General    RELEASE CARPAL TUNNEL Bilateral     RELEASE CARPAL TUNNEL Bilateral     REMOVAL, FOREIGN BODY, RECTUM N/A 7/21/2021    Procedure: MANUAL RETREIVALOF FOREIGN OBJECT- RECTUM.;  Surgeon: Aleksandra Gerber MD;  Location: [facility]    SIGMOIDOSCOPY FLEXIBLE N/A 1/10/2017    Procedure: SIGMOIDOSCOPY FLEXIBLE;  Surgeon: Annmarie Haynes MD;  Location: [facility] OR    SIGMOIDOSCOPY FLEXIBLE N/A 5/8/2018    Procedure: SIGMOIDOSCOPY FLEXIBLE;  flex sig with foreign body removal using snare and rattooth forcep;  Surgeon: Soham Cano MD;  Location:  GI    SIGMOIDOSCOPY FLEXIBLE N/A 2/20/2019    Procedure: Exam under anesthesia Colonoscopy with attempted  removal of rectal foreign body;  Surgeon: Estrada Chávez MD;  Location:  OR       CURRENT MEDICATIONS:    Reviewed independently by me.  No current facility-administered medications for this encounter.    Current Outpatient Medications:     acetaminophen (TYLENOL) 500 MG tablet, Take 2 tablets (1,000 mg) by mouth every 6 hours as needed for mild pain, Disp: 60 tablet, Rfl: 0    acetaminophen (TYLENOL) 500 MG tablet, Take 2 tablets (1,000 mg) by mouth every 6 hours as needed for pain or fever, Disp: 60 tablet, Rfl: 0    albuterol (PROAIR HFA/PROVENTIL HFA/VENTOLIN HFA) 108 (90 Base) MCG/ACT inhaler, Inhale 2 puffs into the lungs every 6 hours as needed for shortness of breath / dyspnea or wheezing, Disp: 18 g, Rfl: 0    albuterol (PROVENTIL) (2.5 MG/3ML) 0.083% neb solution, Take 1 vial (2.5 mg) by nebulization every 6 hours as needed for shortness of breath or wheezing, Disp: 90 mL, Rfl: 0    Apixaban Starter Pack (ELIQUIS DVT/PE STARTER PACK) 5 MG TBPK, Take 2 tablets (10 mg) by mouth twice daily for 7 days then take 1 tablet  (5 mg) twice daily thereafter, Disp: , Rfl:     benzonatate (TESSALON) 100 MG capsule, Take 1 capsule (100 mg) by mouth 3 times daily as needed for cough, Disp: 12 capsule, Rfl: 0    cetirizine (ZYRTEC) 10 MG tablet, Take 1 tablet (10 mg) by mouth daily, Disp: 30 tablet, Rfl: 0    Cholecalciferol (D3 HIGH POTENCY) 25 MCG (1000 UT) CAPS, Take 50 mcg by mouth daily, Disp: , Rfl:     clonazePAM (KLONOPIN) 0.5 MG tablet, Take 1 tablet (0.5 mg) by mouth daily as needed for anxiety, Disp: 7 tablet, Rfl: 0    cyclobenzaprine (FLEXERIL) 10 MG tablet, Take 1 tablet (10 mg) by mouth 3 times daily as needed for muscle spasms, Disp: 20 tablet, Rfl: 0    dicyclomine (BENTYL) 20 MG tablet, Take 1 tablet (20 mg) by mouth 4 times daily as needed (abdominal cramps, diarrhea), Disp: 12 tablet, Rfl: 0    escitalopram (LEXAPRO) 10 MG tablet, Take 10 mg by mouth, Disp: , Rfl:     famotidine (PEPCID) 20 MG tablet, Take 1 tablet (20 mg) by mouth 2 times daily as needed (acid reflux)., Disp: 20 tablet, Rfl: 0    ferrous sulfate (FEROSUL) 325 (65 Fe) MG tablet, Take 1 tablet (325 mg) by mouth daily (with breakfast), Disp: 30 tablet, Rfl: 0    haloperidol (HALDOL) 2 MG tablet, Take 1 tablet (2 mg) by mouth 3 times daily as needed (uncontrolled vomiting/abdominal pain), Disp: 9 tablet, Rfl: 0    hydrOXYzine HCl (ATARAX) 25 MG tablet, Take 25 mg by mouth, Disp: , Rfl:     insulin pen needle (31G X 8 MM) 31G X 8 MM miscellaneous, Use 1 pen needles daily or as directed., Disp: 100 each, Rfl: 1    levofloxacin (LEVAQUIN) 750 MG tablet, Take 1 tablet (750 mg) by mouth daily for 6 days., Disp: 6 tablet, Rfl: 0    montelukast (SINGULAIR) 10 MG tablet, Take 10 mg by mouth every evening, Disp: , Rfl:     norethindrone (AYGESTIN) 5 MG tablet, Take 5 mg by mouth daily, Disp: , Rfl:     ondansetron (ZOFRAN ODT) 4 MG ODT tab, Take 1 tablet (4 mg) by mouth every 8 hours as needed for nausea, Disp: 15 tablet, Rfl: 0    ondansetron (ZOFRAN-ODT) 4 MG ODT tab,  Take 1 tablet (4 mg) by mouth every 8 hours as needed for nausea, Disp: 15 tablet, Rfl: 0    pantoprazole (PROTONIX) 40 MG EC tablet, Take 40 mg by mouth daily, Disp: , Rfl:     polyethylene glycol (MIRALAX) 17 GM/Dose powder, Take 17 g by mouth daily as needed for constipation, Disp: , Rfl:     PSYLLIUM PO, Take 1 tsp by mouth daily, Disp: , Rfl:     Respiratory Therapy Supplies (NEBULIZER) BRENDAN, Nebulizer device.  Albuterol nebulization every 2 hours as needed for shortness of breath or wheezing., Disp: 1 each, Rfl: 0    Semaglutide, 1 MG/DOSE, (OZEMPIC) 4 MG/3ML pen, Inject 1 mg subcutaneously every 7 days., Disp: 3 mL, Rfl: 3    Semaglutide, 2 MG/DOSE, (OZEMPIC) 8 MG/3ML pen, Inject 2 mg Subcutaneous every 7 days, Disp: 9 mL, Rfl: 1    valACYclovir (VALTREX) 1000 mg tablet, Take 2,000 mg by mouth 2 times daily as needed Take 2 tablets by mouth two times daily for one day. Use as needed at onset of cold sore., Disp: , Rfl:     ALLERGIES:  Reviewed independently by me.  Allergies   Allergen Reactions    Amoxicillin-Pot Clavulanate Other (See Comments), Swelling and Rash     PN: facial swelling, left side. Also had cortisone injection the same day as she started the Augmentin.          Influenza Vaccines Other (See Comments) and Nausea and Vomiting     HUT Reaction: Nausea And Vomiting; HUT Reaction Type: Intolerance; HUT Severity: Low; [facility] Noted: [id number]    Latex Rash           Oseltamivir Hives    Penicillins Anaphylaxis    Vancomycin Itching, Swelling and Rash     Flushed face, very itchy    Hydrocodone Nausea and Vomiting and GI Disturbance     vomiting for days    Blood-Group Specific Substance Other (See Comments)     Patient has an anti-Cw and non-specific antibodies. Blood product orders may be delayed. Draw one red top and two purple top tubes for all type/screen/crossmatch orders.  Patient has anti-Cw, anti-K ([place]), Warm auto and nonspecific antibodies. Blood products may be delayed. Draw patient 24  hours prior to transfusion. Draw one red top and two purple top tubes for all type and screen orders.    Clavulanic Acid Angioedema    Haemophilus B Polysaccharide Vaccine Nausea and Vomiting    Oxycodone Swelling    Adhesive Tape Rash     Silicone type  Adhesive allergy      Band-Aid Anti-Itch      Other reaction(s): adhesive allergy    Cephalosporins Rash    Lamotrigine Rash     Possibly associated with Lamictal.     Naltrexone Other (See Comments)     Reaction(s): Vivid dreams.    No Clinical Screening - See Comments Other (See Comments)     History of swallowing sharp metallic objects. She should not be prescribed lancets due to posed risk of swallowing.        FAMILY HISTORY:  Reviewed independently by me.  Family History   Problem Relation Age of Onset    Diabetes Type 2  Maternal Grandmother     Diabetes Type 2  Paternal Grandmother     Breast Cancer Paternal Grandmother     Cerebrovascular Disease Father 53    Myocardial Infarction No family hx of     Coronary Artery Disease Early Onset No family hx of     Ovarian Cancer No family hx of     Colon Cancer No family hx of     Depression Mother     Anxiety Disorder Mother          SOCIAL HISTORY:   Reviewed independently by me.  Social History     Socioeconomic History    Marital status: Single   Occupational History    Occupation: On disability   Tobacco Use    Smoking status: Never    Smokeless tobacco: Never   Substance and Sexual Activity    Alcohol use: No     Alcohol/week: 0.0 standard drinks of alcohol    Drug use: No    Sexual activity: Not Currently     Partners: Male     Birth control/protection: I.U.D.     Comment: IUD - Mirena - placed July, 2015   Social History Narrative    Single.    Living in independent living portion of [employer].    On disability.    No regular exercise.      Social Drivers of Health     Financial Resource Strain: Low Risk  (6/16/2023)    Received from [facility], [place]  [facility]    Financial Resource Strain     Difficulty of Paying Living Expenses: 3   Food Insecurity: No Food Insecurity (11/1/2023)    Received from [facility]    Food Insecurity     Do you worry your food will run out before you are able to buy more?: 1   Transportation Needs: No Transportation Needs (11/1/2023)    Received from [facility]    Transportation Needs     Does lack of transportation keep you from medical appointments?: 1     Does lack of transportation keep you from work, meetings or getting things that you need?: 1   Social Connections: Socially Integrated (11/1/2023)    Received from [facility]    Social Connections     Do you often feel lonely or isolated from those around you?: 0   Interpersonal Safety: Unknown (4/27/2024)    Received from [facility]    Humiliation, Afraid, Rape, and Kick questionnaire     Physically Abused: No     Sexually Abused: No   Housing Stability: Low Risk  (11/1/2023)    Received from [facility]    Housing Stability     What is your housing situation today?: 1       --------------- PHYSICAL EXAM ---------------  Nursing notes and vitals independently reviewed by me.  VITALS:  Vitals:    11/11/24 1830 11/11/24 1900 11/11/24 1930 11/11/24 2000   BP: (!) 161/90 (!) 148/71 (!) 162/82 (!) 176/78   BP Location: Left arm Left arm Left arm    Pulse: 98 90 92 99   Resp:  20 22    Temp:  98  F (36.7  C) 98.4  F (36.9  C)    TempSrc:  Oral Oral    SpO2: 96% 96% 98% 96%   Weight:       Height:           PHYSICAL EXAM:    General:  alert, interactive, Crying in pain holding LLQ  Eyes:  conjunctivae clear, conjugate gaze  HENT:  atraumatic, nose with no rhinorrhea, oropharynx clear  Neck:  no meningismus  Cardiovascular:  HR 80s during exam, regular rhythm, no murmurs, brisk cap refill  Chest:  no chest wall  tenderness  Pulmonary:  no stridor, normal phonation, normal work of breathing, clear lungs bilaterally  Abdomen:  soft, nondistended, Moderate focal LLQ tenderness with guarding; no rebound or rigidity  :  no CVA tenderness  Back:  no midline spinal tenderness  Musculoskeletal:  no pretibial edema, no calf tenderness. Gross ROM intact to joints of extremities with no obvious deformities.  Skin:  warm, dry, no rash  Neuro:  awake, alert, answers questions appropriately, follows commands, moves all limbs  Psych:  anxious affect      --------------- RESULTS ---------------  LAB:  Reviewed and independently interpreted by me.  Results for orders placed or performed during the hospital encounter of 11/11/24   US Pelvis Cmplt w Transvag & Doppler LmtPel Duplex Limited    Impression    IMPRESSION:    1.  Normal pelvic ultrasound.         UA with Microscopic reflex to Culture    Specimen: Urine, Midstream   Result Value Ref Range    Color Urine Colorless Colorless, Straw, Light Yellow, Yellow    Appearance Urine Clear Clear    Glucose Urine Negative Negative mg/dL    Bilirubin Urine Negative Negative    Ketones Urine Negative Negative mg/dL    Specific Gravity Urine 1.003 1.001 - 1.030    Blood Urine Negative Negative    pH Urine 7.0 5.0 - 7.0    Protein Albumin Urine Negative Negative mg/dL    Urobilinogen Urine <2.0 <2.0 mg/dL    Nitrite Urine Negative Negative    Leukocyte Esterase Urine Negative Negative    Bacteria Urine Moderate (A) None Seen /HPF    RBC Urine <1 <=2 /HPF    WBC Urine <1 <=5 /HPF    Squamous Epithelials Urine 1 <=1 /HPF   HCG qualitative urine   Result Value Ref Range    hCG Urine Qualitative Negative Negative   Basic metabolic panel   Result Value Ref Range    Sodium 142 135 - 145 mmol/L    Potassium 3.7 3.4 - 5.3 mmol/L    Chloride 102 98 - 107 mmol/L    Carbon Dioxide (CO2) 23 22 - 29 mmol/L    Anion Gap 17 (H) 7 - 15 mmol/L    Urea Nitrogen 9.6 6.0 - 20.0 mg/dL    Creatinine 0.61 0.51 - 0.95  mg/dL    GFR Estimate >90 >60 mL/min/1.73m2    Calcium 9.7 8.8 - 10.4 mg/dL    Glucose 102 (H) 70 - 99 mg/dL   Hepatic function panel   Result Value Ref Range    Protein Total 7.3 6.4 - 8.3 g/dL    Albumin 4.5 3.5 - 5.2 g/dL    Bilirubin Total 0.3 <=1.2 mg/dL    Alkaline Phosphatase 81 40 - 150 U/L    AST 28 0 - 45 U/L    ALT 68 (H) 0 - 50 U/L    Bilirubin Direct <0.20 0.00 - 0.30 mg/dL   Result Value Ref Range    Lipase 40 13 - 60 U/L   Result Value Ref Range    Procalcitonin 0.02 <0.50 ng/mL   Result Value Ref Range    CRP Inflammation 6.73 (H) <5.00 mg/L   Result Value Ref Range    Magnesium 2.1 1.7 - 2.3 mg/dL   CBC with platelets and differential   Result Value Ref Range    WBC Count 9.6 4.0 - 11.0 10e3/uL    RBC Count 5.11 3.80 - 5.20 10e6/uL    Hemoglobin 15.3 11.7 - 15.7 g/dL    Hematocrit 44.9 35.0 - 47.0 %    MCV 88 78 - 100 fL    MCH 29.9 26.5 - 33.0 pg    MCHC 34.1 31.5 - 36.5 g/dL    RDW 13.2 10.0 - 15.0 %    Platelet Count 291 150 - 450 10e3/uL    % Neutrophils 65 %    % Lymphocytes 29 %    % Monocytes 5 %    % Eosinophils 1 %    % Basophils 0 %    % Immature Granulocytes 0 %    NRBCs per 100 WBC 0 <1 /100    Absolute Neutrophils 6.2 1.6 - 8.3 10e3/uL    Absolute Lymphocytes 2.8 0.8 - 5.3 10e3/uL    Absolute Monocytes 0.5 0.0 - 1.3 10e3/uL    Absolute Eosinophils 0.1 0.0 - 0.7 10e3/uL    Absolute Basophils 0.0 0.0 - 0.2 10e3/uL    Absolute Immature Granulocytes 0.0 <=0.4 10e3/uL    Absolute NRBCs 0.0 10e3/uL       RADIOLOGY:  Reviewed and independently interpreted by me. Please see official radiology report.  Recent Results (from the past 24 hours)   US Pelvis Cmplt w Transvag & Doppler LmtPel Duplex Limited    Narrative    EXAM: US PELVIS COMPLETE W TRANSVAGINAL AND DOPPLER LIMITED  LOCATION: [facility]  DATE: 11/11/2024    INDICATION: Pelvic pain  COMPARISON: Unenhanced CT abdomen pelvis 11/10/2024  TECHNIQUE: Transabdominal scans were performed. Endovaginal ultrasound was  performed to better visualize the adnexa. Color flow with spectral Doppler and waveform analysis performed.    FINDINGS:    UTERUS: 6.2 x 3.9 x 3.5 cm. Normal in size and position with no masses.    ENDOMETRIUM: 3 mm. Normal smooth endometrium.    RIGHT OVARY: 3.9 x 2.5 x 1.8 cm. Normal with arterial and venous duplex flow identified.    LEFT OVARY: 2.0 x 1.3 x 0.9 cm. Normal with arterial and venous duplex flow identified.    No significant free fluid.      Impression    IMPRESSION:    1.  Normal pelvic ultrasound.                       --------------- ADDITIONAL MDM ---------------  MIPS:  Not Applicable    History:  - I considered systemic symptoms of the presenting illness.  - Supplemental history from:       -- patient, group home staff  - External Record(s) reviewed:       -- Inpatient/outpatient record (outside ED visit 11/10/24), prior labs (blood 11/10/24), prior imaging (CT abdomen/pelvis 11/10/24)       -- see above ED course & MDM for further details    Workup:  - Chart documentation above includes differential considered and my independent interpretation any EKGs, labs tests, and/or imaging  - emergent/severe conditions considered and evaluated for: see above differential & MDM  - In additional to work up documented, I considered the following work up:       -- CTA chest/abdomen/pelvis       -- see above ED course & MDM for further details    External Consultation:  - Discussion of management with another provider:       -- ED pharmacist re: meds       -- see above charting for additional    Complicating Factors:  - Care impacted by chronic illness:       -- see above MDM, past medical history, & problem list    Disposition Considerations:  - Discharge       -- I considered escalation of care with admission to the hospital, but ultimately discharged the patient given reassuring workup       -- I recommended the patient continue their current prescription strength medication(s) as charted above in  current medications list       -- I prescribed prescription strength medication(s) as charted above       -- I recommended over-the-counter medication(s) as charted above & in discharge instructions       I, Anjelica Cook, am serving as a scribe to document services personally performed by Dr. Jeffrey Vail based on my observation and the provider's statements to me. I, Jeffrey Vail MD attest that Anjelica Cook is acting in a scribe capacity, has observed my performance of the services and has documented them in accordance with my direction.      Jeffrey Vail MD  11/11/24  Emergency Medicine  [facility] EMERGENCY ROOM  [facility] [facility] [address]  [phone]  Dept: [phone]      Jeffrey Vail MD  11/12/24 0140

## 2024-11-11 NOTE — ED TRIAGE NOTES
Pt from group home, abdominal pain, frequent urination and pain with urination. Pt hx of swallowing objects, denies swallowing any objects.

## 2024-11-11 NOTE — ED NOTES
Patient sent to ultrasound with ultrasound technician. Updated the technician to stay with patient at all times due to history of foreign body ingestion.

## 2024-11-11 NOTE — ED TRIAGE NOTES
Patient arrived via EMS with complaint of pain with urination and urinary frequency. Patient with history of foreign body ingestion. 1.1 sitter initiated for safety.        Triage Assessment (Adult)       Row Name 11/11/24 1438          Triage Assessment    Airway WDL WDL        Respiratory WDL    Respiratory WDL WDL        Skin Circulation/Temperature WDL    Skin Circulation/Temperature WDL WDL        Cardiac WDL    Cardiac WDL WDL        Peripheral/Neurovascular WDL    Peripheral Neurovascular WDL WDL        Cognitive/Neuro/Behavioral WDL    Cognitive/Neuro/Behavioral WDL WDL

## 2024-11-11 NOTE — TELEPHONE ENCOUNTER
General Call      Reason for Call:  Pt needs refill of wagovey    What are your questions or concerns:  [facility] has questions, pls call [phone]     No

## 2024-11-12 NOTE — DISCHARGE INSTRUCTIONS
Take the antibiotic (Levaquin) to help treat the bacteria in your urine.    As needed for pain control at home, take:  - over-the-counter acetaminophen 1g by mouth every 6 hours (max dose 4g in 24 hours)  - previously-prescribed Bentyl for abdominal pain    Follow up with your Primary Care provider in 2 days for a recheck.    Return to the Emergency Department for any difficulty breathing, persistent vomiting, severe worsening, or any other concerns.

## 2024-11-12 NOTE — ED NOTES
Patient back from ultrasound has multiple complaints about feeling confused anxious, shaky wants to speak to the provider. Provider notified. Patient reports her pain is much better 6/10 now.  Rafa Ospina RN  11/11/2024  6:36 PM

## 2024-11-13 ENCOUNTER — VIRTUAL VISIT (OUTPATIENT)
Dept: GASTROENTEROLOGY | Facility: CLINIC | Age: 34
End: 2024-11-13
Attending: PHYSICIAN ASSISTANT
Payer: COMMERCIAL

## 2024-11-13 ENCOUNTER — TELEPHONE (OUTPATIENT)
Dept: ENDOCRINOLOGY | Facility: CLINIC | Age: 34
End: 2024-11-13

## 2024-11-13 VITALS — BODY MASS INDEX: 43.79 KG/M2 | HEIGHT: 62 IN | WEIGHT: 238 LBS

## 2024-11-13 DIAGNOSIS — E11.9 TYPE 2 DIABETES MELLITUS WITHOUT COMPLICATION, WITHOUT LONG-TERM CURRENT USE OF INSULIN (H): ICD-10-CM

## 2024-11-13 DIAGNOSIS — K22.3 ESOPHAGEAL PERFORATION: ICD-10-CM

## 2024-11-13 DIAGNOSIS — Z87.821: ICD-10-CM

## 2024-11-13 DIAGNOSIS — R13.19 ESOPHAGEAL DYSPHAGIA: Primary | ICD-10-CM

## 2024-11-13 DIAGNOSIS — T18.9XXS SWALLOWED FOREIGN BODY, SEQUELA: ICD-10-CM

## 2024-11-13 DIAGNOSIS — R09.A2 GLOBUS SENSATION: ICD-10-CM

## 2024-11-13 DIAGNOSIS — F45.8 AEROPHAGIA: ICD-10-CM

## 2024-11-13 LAB — BACTERIA UR CULT: NORMAL

## 2024-11-13 RX ORDER — PANTOPRAZOLE SODIUM 40 MG/1
40 TABLET, DELAYED RELEASE ORAL 2 TIMES DAILY
Qty: 180 TABLET | Refills: 1 | Status: SHIPPED | OUTPATIENT
Start: 2024-11-13 | End: 2025-02-11

## 2024-11-13 RX ORDER — POLYETHYLENE GLYCOL 3350 17 G/17G
17 POWDER, FOR SOLUTION ORAL DAILY PRN
Status: CANCELLED | OUTPATIENT
Start: 2024-11-13

## 2024-11-13 RX ORDER — POLYETHYLENE GLYCOL 3350 17 G/17G
1 POWDER, FOR SOLUTION ORAL DAILY
Qty: 578 G | Refills: 1 | Status: SHIPPED | OUTPATIENT
Start: 2024-11-13 | End: 2024-11-14

## 2024-11-13 ASSESSMENT — PAIN SCALES - GENERAL: PAINLEVEL_OUTOF10: NO PAIN (0)

## 2024-11-13 NOTE — PATIENT INSTRUCTIONS
It was a pleasure meeting with you today and discussing your healthcare plan. Below is a summary of what we covered:    Aerophagia:     Aerophagia is the act of swallowing air. The majority of gastric gas/air is secondary to aerophagia.     Ways to reduce swallowed air include:     Eating slowly and with intention   Taking small bites/sips rather then gulping foods/drinks  Avoid chewing gum, using straws and smoking       - Trial of escalation of PPI therapy.  Increase pantoprazole to 40 mg twice daily which is best taken on empty stomach 30 to 60 minutes prior to meals.  If there is no improvement after 8 to 12 weeks time we will then de-escalate to once daily dosing.  - Please follow aerophagia lifestyle modifications as directed within the AVS with emphasis placed on elimination of all straw use  - Consultation with behavioral health psychology for diaphragmatic breathing   - Future considerations would be escalation of amitriptyline for globus sensation. However would ask for assistance with the escalation process from the patient's psychiatrist in the light of multiple concomitant psychiatric medications and prior psychiatric history.        Gastroesophageal Reflux Disease (GERD) Lifestyle Modifications:   If taking acid suppression therapy (PPI ie Pantoprazole, Lansoprazole, Omeprazole, Esomeprazole, Rabeprazole, Dexlansoprazole) it should be taken 30 - 60 minutes prior to meals on an empty stomach to have maximum effect  Avoid triggers for reflux such as coffee, chocolate, carbonated beverages, spicy foods, acidic foods (tomato based/citrus and foods with high fat content   Abstinence from alcohol and cessation of all tobacco products is recommended   Studies have shown that weight loss, exercise and maintaining a healthy BMI significantly reduce GERD symptoms   Remain upright while eating and immediately after meals  Do not eat or drink at least 3 hours prior to laying down supine/laying down for bed    Avoid late night/middle of the night snacking    Consider obtaining a wedge pillow or elevating the head end of the bed while sleeping   Avoid sleeping right side down as this can place the lower part of the esophagus/lower esophageal sphincter in a dependent position that favors reflux   Attempting to eat smaller more frequent meals may improve symptoms       Please call my nurse Roberta ([phone]) with any questions or concerns.      See below for any additional questions and scheduling guidelines.    Sign up for [employer]ee[employer]: [employer] patient portal serves as a secure platform for accessing your medical records from the [facility]. Additionally, [employer] facilitates easy, timely, and secure messaging with your care team. If you have not signed up, you may do so by using the provided code or calling [phone].    Coordinating your care after your visit:  There are multiple options for scheduling your follow-up care based on your provider's recommendation.    How do I schedule a follow-up clinic appointment:   After your appointment, you may receive scheduling assistance with the Clinic Coordinators by having a seat in the waiting room and a Clinic Coordinator will call you up to schedule.  Virtual visits or after you leave the clinic:  Your provider has placed a follow-up order in the [employer] portal for scheduling your return appointment. A member of the scheduling team will contact you to schedule.  [employer] Scheduling: Timely scheduling through [employer] is advised to ensure appointment availability.   Call to schedule: You may schedule your follow-up appointment(s) by calling [phone], option 1.    How do I schedule my endoscopy or colonoscopy procedure:  If a procedure, such as a colonoscopy or upper endoscopy was ordered by your provider, the scheduling team will contact you to schedule this procedure. Or you may choose to call to schedule at   [phone], option 2.  Please allow 20-30  minutes when scheduling a procedure.    How do I get my blood work done? To get your blood work done, you need to schedule a lab appointment at an [facility]. There are multiple ways to schedule:   At the clinic: The Clinic Coordinator you meet after your visit can help you schedule a lab appointment.   [employer] scheduling: [employer] offers online lab scheduling at all [facility] laboratory locations.   Call to schedule: You can call [phone] to schedule your lab appointment.    How do I schedule my imaging study: To schedule imaging studies, such as CT scans, ultrasounds, MRIs, or X-rays, contact Imaging Services at [phone].    How do I schedule a referral to another doctor: If your provider recommended a referral to another specialist(s), the referral order was placed by your provider. You will receive a phone call to schedule this referral, or you may choose to call the number attached to the referral to self-schedule.    For Post-Visit Question(s):  For any inquiries following today's visit:  Please utilize [employer] messaging and allow 48 hours for reply or contact the Call Center during normal business hours at [phone], option 3.  For Emergent After-hours questions, contact the On-Call GI Fellow through the [facility]  at [phone].  In addition, you may contact your Nurse directly using the provided contact information.    Test Results:  Test results will be accessible via [employer] in compliance with the 21st Century Cures Act. This means that your results will be available to you at the same time as your provider. Often you may see your results before your provider does. Results are reviewed by staff within two weeks with communication follow-up. Results may be released in the patient portal prior to your care team review.    Prescription Refill(s):  Medication prescribed by your provider will be addressed during your visit. For future refills, please  coordinate with your pharmacy. If you have not had a recent clinic visit or routine labs, for your safety, your provider may not be able to refill your prescription.         Sincerely,    Carli Potter PA-C  Division of Gastroenterology, Hepatology, and Nutrition  [facility]

## 2024-11-13 NOTE — LETTER
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  COMBINED ESOPHAGOSCOPY, GASTROSCOPY, DUODENOSCOPY (EGD) TO REMOVE FOREIGN BODY;  Surgeon: Lokesh Paula MD;  [facility]     ESOPHAGOSCOPY, GASTROSCOPY, DUODENOSCOPY (EGD), COMBINED N/A 8/4/2018    Procedure: COMBINED ESOPHAGOSCOPY, GASTROSCOPY, DUODENOSCOPY (EGD), REMOVE FOREIGN BODY;   combined esophagoscopy, gastroscopy, duodenoscopy, REMOVE FOREIGN BODY ;  Surgeon: Lokesh Paula MD;  [facility]     ESOPHAGOSCOPY, GASTROSCOPY, DUODENOSCOPY (EGD), COMBINED N/A 10/6/2019    Procedure: ESOPHAGOGASTRODUODENOSCOPY (EGD) with fireign body removal x2, esophageal stent placement with floroscopy;  Surgeon: Timoteo Espana MD;  [facility]     ESOPHAGOSCOPY, GASTROSCOPY, DUODENOSCOPY (EGD), COMBINED N/A 12/2/2019    Procedure: Esophagogastroduodenoscopy with esophageal stent removal, esophogram;  Surgeon: Kailee Tena MD;  [facility]     ESOPHAGOSCOPY, GASTROSCOPY, DUODENOSCOPY (EGD), COMBINED N/A 12/17/2019    Procedure: ESOPHAGOGASTRODUODENOSCOPY, WITH FOREIGN BODY REMOVAL;  Surgeon: Pamela Perez  MD Krystin;  [facility]     ESOPHAGOSCOPY, GASTROSCOPY, DUODENOSCOPY (EGD), COMBINED N/A 12/13/2019    Procedure: ESOPHAGOGASTRODUODENOSCOPY, WITH FOREIGN BODY REMOVAL;  Surgeon: Samia Stanton MD;  [facility]     ESOPHAGOSCOPY, GASTROSCOPY, DUODENOSCOPY (EGD), COMBINED N/A 12/28/2019    Procedure: ESOPHAGOGASTRODUODENOSCOPY (EGD) Removal of Foreign Body X 2;  Surgeon: Huy Kelley MD;  [facility]     ESOPHAGOSCOPY, GASTROSCOPY, DUODENOSCOPY (EGD), COMBINED N/A 1/5/2020    Procedure: ESOPHAGOGASTRODUOENOSCOPY WITH FOREIGN BODY REMOVAL;  Surgeon: Pamela Perez MD;  [facility]     ESOPHAGOSCOPY, GASTROSCOPY, DUODENOSCOPY (EGD), COMBINED N/A 1/3/2020    Procedure: ESOPHAGOGASTRODUODENOSCOPY (EGD) REMOVAL OF FOREIGN BODY.;  Surgeon: Pamela Perez MD;  [facility]     ESOPHAGOSCOPY, GASTROSCOPY, DUODENOSCOPY (EGD), COMBINED N/A 1/13/2020    Procedure: ESOPHAGOGASTRODUODENOSCOPY (EGD) for foreign body removal;  Surgeon: Lokesh Paula MD;  [facility]     ESOPHAGOSCOPY, GASTROSCOPY, DUODENOSCOPY (EGD), COMBINED N/A 1/18/2020    Procedure: Diagnostic ESOPHAGOGASTRODUODENOSCOPY (EGD;  Surgeon: Lokesh Paula MD;  [facility]     ESOPHAGOSCOPY, GASTROSCOPY, DUODENOSCOPY (EGD), COMBINED N/A 3/29/2020    Procedure: UPPER ENDOSCOPY WITH FOREIGN BODY REMOVAL;  Surgeon: Doug Hansen MD;  [facility]     ESOPHAGOSCOPY, GASTROSCOPY, DUODENOSCOPY (EGD), COMBINED N/A 7/11/2020    Procedure: ESOPHAGOGASTRODUODENOSCOPY (EGD); Upper Endoscopy WITH FOREIGN BODY REMOVAL;  Surgeon: Lyndsey Mendoza DO;  [facility]     ESOPHAGOSCOPY, GASTROSCOPY, DUODENOSCOPY (EGD), COMBINED N/A 7/29/2020    Procedure: ESOPHAGOGASTRODUODENOSCOPY REMOVAL OF FOREIGN BODY;  Surgeon: Samia Stanton MD;  [facility]     ESOPHAGOSCOPY, GASTROSCOPY, DUODENOSCOPY (EGD), COMBINED N/A 8/1/2020    Procedure: ESOPHAGOGASTRODUODENOSCOPY, WITH FOREIGN BODY  REMOVAL;  Surgeon: Pamela Perez MD;  [facility]     ESOPHAGOSCOPY, GASTROSCOPY, DUODENOSCOPY (EGD), COMBINED N/A 8/18/2020    Procedure: ESOPHAGOGASTRODUODENOSCOPY (EGD) for foreign body removal;  Surgeon: Pamela Perez MD;  [facility]     ESOPHAGOSCOPY, GASTROSCOPY, DUODENOSCOPY (EGD), COMBINED N/A 8/27/2020    Procedure: ESOPHAGOGASTRODUODENOSCOPY (EGD) with foreign body removal;  Surgeon: Campbell Rogers MD;  [facility]     ESOPHAGOSCOPY, GASTROSCOPY, DUODENOSCOPY (EGD), COMBINED N/A 9/18/2020    Procedure: ESOPHAGOGASTRODUODENOSCOPY (EGD) with foreign body removal;  Surgeon: Dick Gillis MD;  [facility]     ESOPHAGOSCOPY, GASTROSCOPY, DUODENOSCOPY (EGD), COMBINED N/A 11/18/2020    Procedure: ESOPHAGOGASTRODUODENOSCOPY, WITH FOREIGN BODY REMOVAL;  Surgeon: Felipe Ulloa DO;  [facility]     ESOPHAGOSCOPY, GASTROSCOPY, DUODENOSCOPY (EGD), COMBINED N/A 11/28/2020    Procedure: ESOPHAGOGASTRODUODENOSCOPY (EGD);  Surgeon: Campbell Rogers MD;  [facility]     ESOPHAGOSCOPY, GASTROSCOPY, DUODENOSCOPY (EGD), COMBINED N/A 3/12/2021    Procedure: ESOPHAGOGASTRODUODENOSCOPY, WITH FOREIGN BODY REMOVAL using cold snare;  Surgeon: Marianna Rudolph MD;  Location: [facility]     ESOPHAGOSCOPY, GASTROSCOPY, DUODENOSCOPY (EGD), COMBINED N/A 12/10/2017    Procedure: ESOPHAGOGASTRODUODENOSCOPY (EGD) with foreign body removal;  Surgeon: Lila Sol MD;  Location: [facility];  Service:      ESOPHAGOSCOPY, GASTROSCOPY, DUODENOSCOPY (EGD), COMBINED N/A 2/13/2018    Procedure: ESOPHAGOGASTRODUODENOSCOPY (EGD);  Surgeon: Barney Pinto MD;  Location: [facility];  Service:      ESOPHAGOSCOPY, GASTROSCOPY, DUODENOSCOPY (EGD), COMBINED N/A 11/9/2018    Procedure: UPPER ENDOSCOPY, FOREIGN BODY REMOVAL;  Surgeon: Cristino Kelsey MD;  Location: [facility];  Service: Gastroenterology     ESOPHAGOSCOPY, GASTROSCOPY,  DUODENOSCOPY (EGD), COMBINED N/A 11/17/2018    Procedure: ESOPHAGOGASTRODUODENOSCOPY (EGD) with foreign body removal;  Surgeon: Gustavo Mathew MD;  Location: [facility];  Service: Gastroenterology     ESOPHAGOSCOPY, GASTROSCOPY, DUODENOSCOPY (EGD), COMBINED N/A 11/22/2018    Procedure: ESOPHAGOGASTRODUODENOSCOPY (EGD);  Surgeon: Binu Vigil MD;  Location: [facility];  Service: Gastroenterology     ESOPHAGOSCOPY, GASTROSCOPY, DUODENOSCOPY (EGD), COMBINED N/A 11/25/2018    Procedure: UPPER ENDOSCOPY TO REMOVE PAPER CLIPS;  Surgeon: Hira Jacobs MD;  Location: [facility];  Service: Gastroenterology     ESOPHAGOSCOPY, GASTROSCOPY, DUODENOSCOPY (EGD), COMBINED N/A 8/1/2021    Procedure: ESOPHAGOGASTRODUODENOSCOPY (EGD);  Surgeon: Binu Vigil MD;  Location: [facility]     ESOPHAGOSCOPY, GASTROSCOPY, DUODENOSCOPY (EGD), COMBINED N/A 7/31/2021    Procedure: ESOPHAGOGASTRODUODENOSCOPY (EGD);  Surgeon: Keith Quinn MD;  Location: [facility]     ESOPHAGOSCOPY, GASTROSCOPY, DUODENOSCOPY (EGD), COMBINED N/A 8/13/2021    Procedure: ESOPHAGOGASTRODUODENOSCOPY (EGD);  Surgeon: Gustavo Mathew MD;  Location: [facility]     ESOPHAGOSCOPY, GASTROSCOPY, DUODENOSCOPY (EGD), COMBINED N/A 8/13/2021    Procedure: ESOPHAGOGASTRODUODENOSCOPY (EGD) with foreign body removal;  Surgeon: Gustavo Mathew MD;  Location: [facility]     ESOPHAGOSCOPY, GASTROSCOPY, DUODENOSCOPY (EGD), COMBINED N/A 1/30/2022    Procedure: ESOPHAGOGASTRODUODENOSCOPY (EGD) FOREIGN BODY REMOVAL;  Surgeon: Bird Sethi MD;  Location: [facility]     ESOPHAGOSCOPY, GASTROSCOPY, DUODENOSCOPY (EGD), COMBINED N/A 2/3/2022    Procedure: ESOPHAGOGASTRODUODENOSCOPY (EGD), FOREIGN BODY REMOVAL;  Surgeon: Binu Vigil MD;  Location: [facility] OR     ESOPHAGOSCOPY, GASTROSCOPY, DUODENOSCOPY (EGD), COMBINED N/A 2/7/2022    Procedure: ESOPHAGOGASTRODUODENOSCOPY (EGD) WITH FOREIGN BODY REMOVAL;   Surgeon: Darek Mendoza MD;  Location: [facility] OR     ESOPHAGOSCOPY, GASTROSCOPY, DUODENOSCOPY (EGD), COMBINED N/A 2/8/2022    Procedure: ESOPHAGOGASTRODUODENOSCOPY (EGD), foreign body removal;  Surgeon: Lyndsey Mendoza DO;  [facility]     ESOPHAGOSCOPY, GASTROSCOPY, DUODENOSCOPY (EGD), COMBINED N/A 2/15/2022    Procedure: UPPER ESOPHAGOGASTRODUODENOSCOPY, WITH FOREIGN BODY REMOVAL AND USE OF BLANKENSHIP;  Surgeon: Samia Stanton MD;  [facility]     ESOPHAGOSCOPY, GASTROSCOPY, DUODENOSCOPY (EGD), COMBINED N/A 7/9/2022    Procedure: ESOPHAGOGASTRODUODENOSCOPY (EGD) with foreign body extraction;  Surgeon: Felipe Ulloa DO;  [facility]     ESOPHAGOSCOPY, GASTROSCOPY, DUODENOSCOPY (EGD), COMBINED N/A 7/29/2022    Procedure: ESOPHAGOGASTRODUODENOSCOPY (EGD) WITH FOREIGN BODY REMOVAL;  Surgeon: Pamela Perez MD;  [facility]     ESOPHAGOSCOPY, GASTROSCOPY, DUODENOSCOPY (EGD), COMBINED N/A 8/6/2022    Procedure: ESOPHAGOGASTRODUODENOSCOPY, WITH FOREIGN BODY REMOVAL;  Surgeon: Bety Nova MD;  Location:  [facility]     ESOPHAGOSCOPY, GASTROSCOPY, DUODENOSCOPY (EGD), COMBINED N/A 8/13/2022    Procedure: ESOPHAGOGASTRODUODENOSCOPY, WITH FOREIGN BODY REMOVAL using raptor device;  Surgeon: Brice Ramirez MD;  Location:  [facility]     ESOPHAGOSCOPY, GASTROSCOPY, DUODENOSCOPY (EGD), COMBINED N/A 8/24/2022    Procedure: ESOPHAGOGASTRODUODENOSCOPY (EGD);  Surgeon: Jeffy Bradley MD;  [facility]     ESOPHAGOSCOPY, GASTROSCOPY, DUODENOSCOPY (EGD), COMBINED N/A 9/17/2022    Procedure: ESOPHAGOGASTRODUODENOSCOPY (EGD), Foreign Body removal;  Surgeon: Pamela Perez MD;  [facility]     ESOPHAGOSCOPY, GASTROSCOPY, DUODENOSCOPY (EGD), COMBINED N/A 9/25/2022    Procedure: ESOPHAGOGASTRODUODENOSCOPY, WITH FOREIGN BODY REMOVAL;  Surgeon: Kash Griffin MD;  Location:  [facility]     ESOPHAGOSCOPY, GASTROSCOPY, DUODENOSCOPY (EGD), COMBINED N/A 10/23/2022     Procedure: ESOPHAGOGASTRODUODENOSCOPY (EGD) FOR REMOVAL OF FOREIGN BODY;  Surgeon: Barney Pinto MD;  Location: [facility] OR     ESOPHAGOSCOPY, GASTROSCOPY, DUODENOSCOPY (EGD), COMBINED N/A 11/3/2022    Procedure: ESOPHAGOGASTRODUODENOSCOPY (EGD) for foreign body removal;  Surgeon: Cruz Kumar MD;  Location: [facility] OR     ESOPHAGOSCOPY, GASTROSCOPY, DUODENOSCOPY (EGD), COMBINED N/A 11/29/2022    Procedure: ESOPHAGOGASTRODUODENOSCOPY (EGD);  Surgeon: Cristino Kelsey MD, MD;  Location: [facility] OR     ESOPHAGOSCOPY, GASTROSCOPY, DUODENOSCOPY (EGD), COMBINED N/A 12/8/2022    Procedure: ESOPHAGOGASTRODUODENOSCOPY (EGD) with foreign body removal;  Surgeon: Efrem Begum MD;  Location: [facility]     ESOPHAGOSCOPY, GASTROSCOPY, DUODENOSCOPY (EGD), COMBINED N/A 12/28/2022    Procedure: ESOPHAGOGASTRODUODENOSCOPY, WITH FOREIGN BODY REMOVAL;  Surgeon: Doug Hansen MD;  Location:  GI     ESOPHAGOSCOPY, GASTROSCOPY, DUODENOSCOPY (EGD), COMBINED N/A 1/20/2023    Procedure: ESOPHAGOGASTRODUODENOSCOPY (EGD);  Surgeon: Bety Nova MD;  Location: [facility]     ESOPHAGOSCOPY, GASTROSCOPY, DUODENOSCOPY (EGD), COMBINED N/A 3/11/2023    Procedure: ESOPHAGOGASTRODUODENOSCOPY WITH FOREIGN BODY REMOVAL;  Surgeon: Cruz Kumar MD;  Location: [facility]     ESOPHAGOSCOPY, GASTROSCOPY, DUODENOSCOPY (EGD), COMBINED N/A 10/16/2023    Procedure: ESOPHAGOGASTRODUODENOSCOPY (EGD) WITH FOREIGN BODY REMOVAL;  Surgeon: Cruz Kumar MD;  Location: [facility]     ESOPHAGOSCOPY, GASTROSCOPY, DUODENOSCOPY (EGD), COMBINED N/A 10/29/2023    Procedure: ESOPHAGOGASTRODUODENOSCOPY, WITH FOREIGN BODY REMOVAL;  Surgeon: Kash Griffin MD;  Location:  GI     ESOPHAGOSCOPY, GASTROSCOPY, DUODENOSCOPY (EGD), COMBINED N/A 3/29/2024    Procedure: ESOPHAGOGASTRODUODENOSCOPY WITH BIOSPIES;  Surgeon: Gustavo Mathew MD;  Location: [facility]      ESOPHAGOSCOPY, GASTROSCOPY, DUODENOSCOPY (EGD), DILATATION, COMBINED N/A 8/30/2021    Procedure: ESOPHAGOGASTRODUODENOSCOPY, WITH DILATION (mngi);  Surgeon: Pat Cervantes MD;  [facility]     EXAM UNDER ANESTHESIA ANUS N/A 1/10/2017    Procedure: EXAM UNDER ANESTHESIA ANUS;  Surgeon: Annmarie Haynes MD;  [facility]     EXAM UNDER ANESTHESIA RECTUM N/A 7/19/2018    Procedure: EXAM UNDER ANESTHESIA RECTUM;  EXAM UNDER ANESTHESIA, REMOVAL OF RECTAL FOREIGN BODY;  Surgeon: Annmarie Haynes MD;  [facility]     HC REMOVE FECAL IMPACTION OR FB W ANESTHESIA N/A 12/18/2016    Procedure: REMOVE FECAL IMPACTION/FOREIGN BODY UNDER ANESTHESIA;  Surgeon: Soham Cano MD;  [facility]     IR CVC TUNNEL PLACEMENT > 5 YRS OF AGE  4/2/2024     IR CVC TUNNEL REMOVAL RIGHT  4/16/2024     IR LUMBAR PUNCTURE  8/14/2024     KNEE SURGERY Right      KNEE SURGERY - removed a small tissue mass from knee Right      LAPAROSCOPIC ABLATION ENDOMETRIOSIS       LAPAROTOMY EXPLORATORY N/A 1/10/2017    Procedure: LAPAROTOMY EXPLORATORY;  Surgeon: Annmarie Haynes MD;  [facility]     LAPAROTOMY EXPLORATORY  09/11/2019    Manual manipulation of bowel to remove pill bottle in rectum     lymph nodes removed from neck; benign  age 7     MAMMOPLASTY REDUCTION Bilateral      OTHER SURGICAL HISTORY      foreign body anus removal     PICC DOUBLE LUMEN PLACEMENT  4/25/2024     SC ESOPHAGOGASTRODUODENOSCOPY TRANSORAL DIAGNOSTIC N/A 1/5/2019    Procedure: ESOPHAGOGASTRODUODENOSCOPY (EGD) with foreign body removal using raptor;  Surgeon: Lila Sol MD;  Location: [facility];  Service: Gastroenterology     SC ESOPHAGOGASTRODUODENOSCOPY TRANSORAL DIAGNOSTIC N/A 1/25/2019    Procedure: ESOPHAGOGASTRODUODENOSCOPY (EGD) removal of foreign body;  Surgeon: Binu Vigil MD;  Location: [facility];  Service: Gastroenterology     SC ESOPHAGOGASTRODUODENOSCOPY TRANSORAL  DIAGNOSTIC N/A 1/31/2019    Procedure: ESOPHAGOGASTRODUODENOSCOPY (EGD);  Surgeon: iSddharth Spears MD;  Location: [facility];  Service: Gastroenterology     AR ESOPHAGOGASTRODUODENOSCOPY TRANSORAL DIAGNOSTIC N/A 8/17/2019    Procedure: ESOPHAGOGASTRODUODENOSCOPY (EGD) with foreign body removal;  Surgeon: Darek Lucero MD;  Location: [facility];  Service: Gastroenterology     AR ESOPHAGOGASTRODUODENOSCOPY TRANSORAL DIAGNOSTIC N/A 9/29/2019    Procedure: ESOPHAGOGASTRODUODENOSCOPY (EGD) with foreign body removal;  Surgeon: Bailey Arnold MD;  Location: [facility];  Service: Gastroenterology     AR ESOPHAGOGASTRODUODENOSCOPY TRANSORAL DIAGNOSTIC N/A 10/3/2019    Procedure: ESOPHAGOGASTRODUODENOSCOPY (EGD), REMOVAL OF FOREIGN BODY;  Surgeon: Chris Lira MD;  Location: [facility];  Service: Gastroenterology     AR ESOPHAGOGASTRODUODENOSCOPY TRANSORAL DIAGNOSTIC N/A 10/6/2019    Procedure: ESOPHAGOGASTRODUODENOSCOPY (EGD) with attempted foreign body removal;  Surgeon: Felipe Connolly MD;  Location: [facility];  Service: Gastroenterology     AR ESOPHAGOGASTRODUODENOSCOPY TRANSORAL DIAGNOSTIC N/A 12/15/2019    Procedure: ESOPHAGOGASTRODUODENOSCOPY (EGD) with foreign body removal;  Surgeon: Jeffy Zuñiga MD;  Location: [facility];  Service: Gastroenterology     AR ESOPHAGOGASTRODUODENOSCOPY TRANSORAL DIAGNOSTIC N/A 12/17/2019    Procedure: ESOPHAGOGASTRODUODENOSCOPY (EGD) with attempted foreign body removal;  Surgeon: Felipe Connolly MD;  Location: [facility];  Service: Gastroenterology     AR ESOPHAGOGASTRODUODENOSCOPY TRANSORAL DIAGNOSTIC N/A 12/21/2019    Procedure: ESOPHAGOGASTRODUODENOSCOPY (EGD) FOR FROEIGN BODY REMOVAL;  Surgeon: Binu Vigil MD;  Location: [facility];  Service: Gastroenterology     AR ESOPHAGOGASTRODUODENOSCOPY TRANSORAL DIAGNOSTIC N/A 7/22/2020    Procedure: ESOPHAGOGASTRODUODENOSCOPY (EGD);  Surgeon:  Bailey Arnold MD;  Location: [facility];  Service: Gastroenterology     IA ESOPHAGOGASTRODUODENOSCOPY TRANSORAL DIAGNOSTIC N/A 8/14/2020    Procedure: ESOPHAGOGASTRODUODENOSCOPY (EGD) FOREIGN BODY REMOVAL;  Surgeon: Jeffy Zuñiga MD;  Location: [facility] OR;  Service: Gastroenterology     IA ESOPHAGOGASTRODUODENOSCOPY TRANSORAL DIAGNOSTIC N/A 2/25/2021    Procedure: ESOPHAGOGASTRODUODENOSCOPY (EGD) with foreign body reoval;  Surgeon: Bird Sethi MD;  Location: [facility];  Service: Gastroenterology     IA ESOPHAGOGASTRODUODENOSCOPY TRANSORAL DIAGNOSTIC N/A 4/19/2021    Procedure: ESOPHAGOGASTRODUODENOSCOPY (EGD);  Surgeon: Libia Rose MD;  Location: [facility];  Service: Gastroenterology     IA SURG DIAGNOSTIC EXAM, ANORECTAL N/A 2/5/2020    Procedure: EXAM UNDER ANESTHESIA, Flexible Sigmoidoscopy, Retrieval of Foreign Body;  Surgeon: Sasha Ivan MD;  Location: [facility];  Service: General     RELEASE CARPAL TUNNEL Bilateral      RELEASE CARPAL TUNNEL Bilateral      REMOVAL, FOREIGN BODY, RECTUM N/A 7/21/2021    Procedure: MANUAL RETREIVALOF FOREIGN OBJECT- RECTUM.;  Surgeon: Aleksandra Gerber MD;  Location: [facility]     SIGMOIDOSCOPY FLEXIBLE N/A 1/10/2017    Procedure: SIGMOIDOSCOPY FLEXIBLE;  Surgeon: Annmarie Haynes MD;  Location:  OR     SIGMOIDOSCOPY FLEXIBLE N/A 5/8/2018    Procedure: SIGMOIDOSCOPY FLEXIBLE;  flex sig with foreign body removal using snare and rattooth forcep;  Surgeon: Soham Cano MD;  Location: [facility]     SIGMOIDOSCOPY FLEXIBLE N/A 2/20/2019    Procedure: Exam under anesthesia Colonoscopy with attempted  removal of rectal foreign body;  Surgeon: Estrada Chávez MD;  Location:  OR       Social History     Socioeconomic History     Marital status: Single     Spouse name: Not on file     Number of children: Not on file     Years of education: Not on file     Highest education level: Not on file   Occupational  History     Occupation: On disability   Tobacco Use     Smoking status: Never     Smokeless tobacco: Never   Vaping Use     Vaping status: Not on file   Substance and Sexual Activity     Alcohol use: No     Alcohol/week: 0.0 standard drinks of alcohol     Drug use: No     Sexual activity: Not Currently     Partners: Male     Birth control/protection: I.U.D.     Comment: IUD - Mirena - placed July, 2015   Other Topics Concern     Parent/sibling w/ CABG, MI or angioplasty before 65F 55M? Not Asked   Social History Narrative    Single.    Living in independent living portion of [employer].    On disability.    No regular exercise.      Social Drivers of Health     Financial Resource Strain: Low Risk  (6/16/2023)    [email or web address]    Financial Resource Strain      Difficulty of Paying Living Expenses: 3      Difficulty of Paying Living Expenses: Not on file   Food Insecurity: No Food Insecurity (11/1/2023)    Received from [facility]    Food Insecurity      Do you worry your food will run out before you are able to buy more?: 1   Transportation Needs: No Transportation Needs (11/1/2023)    Received from [facility]    Transportation Needs      Does lack of transportation keep you from medical appointments?: 1      Does lack of transportation keep you from work, meetings or getting things that you need?: 1   Physical Activity: Not on file   Stress: Not on file   Social Connections: Socially Integrated (11/1/2023)    [email or web address]    Social Connections      Do you often feel lonely or isolated from those around you?: 0   Interpersonal Safety: Not At Risk (11/12/2024)    Received from [facility]    Humiliation, Afraid, Rape, and Kick questionnaire      Fear of Current or Ex-Partner: No      Emotionally Abused:  No      Physically Abused: No      Sexually Abused: No   Housing Stability: Low Risk  (11/1/2023)    Received from [facility]    Housing Stability      What is your housing situation today?: 1       Family History   Problem Relation Age of Onset     Diabetes Type 2  Maternal Grandmother      Diabetes Type 2  Paternal Grandmother      Breast Cancer Paternal Grandmother      Cerebrovascular Disease Father 53     Myocardial Infarction No family hx of      Coronary Artery Disease Early Onset No family hx of      Ovarian Cancer No family hx of      Colon Cancer No family hx of      Depression Mother      Anxiety Disorder Mother      Family history reviewed and edited as appropriate    Medications and Allergies:     Outpatient Encounter Medications as of 11/13/2024   Medication Sig Dispense Refill     acetaminophen (TYLENOL) 500 MG tablet Take 2 tablets (1,000 mg) by mouth every 6 hours as needed for mild pain 60 tablet 0     acetaminophen (TYLENOL) 500 MG tablet Take 2 tablets (1,000 mg) by mouth every 6 hours as needed for pain or fever 60 tablet 0     albuterol (PROAIR HFA/PROVENTIL HFA/VENTOLIN HFA) 108 (90 Base) MCG/ACT inhaler Inhale 2 puffs into the lungs every 6 hours as needed for shortness of breath / dyspnea or wheezing 18 g 0     albuterol (PROVENTIL) (2.5 MG/3ML) 0.083% neb solution Take 1 vial (2.5 mg) by nebulization every 6 hours as needed for shortness of breath or wheezing 90 mL 0     Apixaban Starter Pack (ELIQUIS DVT/PE STARTER PACK) 5 MG TBPK Take 2 tablets (10 mg) by mouth twice daily for 7 days then take 1 tablet (5 mg) twice daily thereafter       benzonatate (TESSALON) 100 MG capsule Take 1 capsule (100 mg) by mouth 3 times daily as needed for cough 12 capsule 0     cetirizine (ZYRTEC) 10 MG tablet Take 1 tablet (10 mg) by mouth daily 30 tablet 0     Cholecalciferol (D3 HIGH POTENCY) 25 MCG (1000 UT) CAPS Take 50 mcg by mouth daily       clonazePAM (KLONOPIN) 0.5  MG tablet Take 1 tablet (0.5 mg) by mouth daily as needed for anxiety 7 tablet 0     cyclobenzaprine (FLEXERIL) 10 MG tablet Take 1 tablet (10 mg) by mouth 3 times daily as needed for muscle spasms 20 tablet 0     dicyclomine (BENTYL) 20 MG tablet Take 1 tablet (20 mg) by mouth 4 times daily as needed (abdominal cramps, diarrhea) 12 tablet 0     escitalopram (LEXAPRO) 10 MG tablet Take 10 mg by mouth       famotidine (PEPCID) 20 MG tablet Take 1 tablet (20 mg) by mouth 2 times daily as needed (acid reflux). 20 tablet 0     ferrous sulfate (FEROSUL) 325 (65 Fe) MG tablet Take 1 tablet (325 mg) by mouth daily (with breakfast) 30 tablet 0     haloperidol (HALDOL) 2 MG tablet Take 1 tablet (2 mg) by mouth 3 times daily as needed (uncontrolled vomiting/abdominal pain) 9 tablet 0     hydrOXYzine HCl (ATARAX) 25 MG tablet Take 25 mg by mouth       insulin pen needle (31G X 8 MM) 31G X 8 MM miscellaneous Use 1 pen needles daily or as directed. 100 each 1     levofloxacin (LEVAQUIN) 750 MG tablet Take 1 tablet (750 mg) by mouth daily for 6 days. 6 tablet 0     montelukast (SINGULAIR) 10 MG tablet Take 10 mg by mouth every evening       norethindrone (AYGESTIN) 5 MG tablet Take 5 mg by mouth daily       ondansetron (ZOFRAN ODT) 4 MG ODT tab Take 1 tablet (4 mg) by mouth every 8 hours as needed for nausea 15 tablet 0     ondansetron (ZOFRAN-ODT) 4 MG ODT tab Take 1 tablet (4 mg) by mouth every 8 hours as needed for nausea 15 tablet 0     pantoprazole (PROTONIX) 40 MG EC tablet Take 40 mg by mouth daily       polyethylene glycol (MIRALAX) 17 GM/Dose powder Take 17 g by mouth daily as needed for constipation       PSYLLIUM PO Take 1 tsp by mouth daily       Respiratory Therapy Supplies (NEBULIZER) BRENDAN Nebulizer device.  Albuterol nebulization every 2 hours as needed for shortness of breath or wheezing. 1 each 0     Semaglutide, 1 MG/DOSE, (OZEMPIC) 4 MG/3ML pen Inject 1 mg subcutaneously every 7 days. 3 mL 3     Semaglutide, 2  MG/DOSE, (OZEMPIC) 8 MG/3ML pen Inject 2 mg Subcutaneous every 7 days 9 mL 1     valACYclovir (VALTREX) 1000 mg tablet Take 2,000 mg by mouth 2 times daily as needed Take 2 tablets by mouth two times daily for one day. Use as needed at onset of cold sore.       No facility-administered encounter medications on file as of 11/13/2024.        Allergies   Allergen Reactions     Amoxicillin-Pot Clavulanate Other (See Comments), Swelling and Rash     PN: facial swelling, left side. Also had cortisone injection the same day as she started the Augmentin.           Influenza Vaccines Other (See Comments) and Nausea and Vomiting     HUT Reaction: Nausea And Vomiting; HUT Reaction Type: Intolerance; HUT Severity: Low; HUT Noted: [id number]     Latex Rash            Oseltamivir Hives     Penicillins Anaphylaxis     Vancomycin Itching, Swelling and Rash     Flushed face, very itchy     Hydrocodone Nausea and Vomiting and GI Disturbance     vomiting for days     Blood-Group Specific Substance Other (See Comments)     Patient has an anti-Cw and non-specific antibodies. Blood product orders may be delayed. Draw one red top and two purple top tubes for all type/screen/crossmatch orders.  Patient has anti-Cw, anti-K (Angella), Warm auto and nonspecific antibodies. Blood products may be delayed. Draw patient 24 hours prior to transfusion. Draw one red top and two purple top tubes for all type and screen orders.     Clavulanic Acid Angioedema     Haemophilus B Polysaccharide Vaccine Nausea and Vomiting     Oxycodone Swelling     Adhesive Tape Rash     Silicone type  Adhesive allergy       Band-Aid Anti-Itch      Other reaction(s): adhesive allergy     Cephalosporins Rash     Lamotrigine Rash     Possibly associated with Lamictal.      Naltrexone Other (See Comments)     Reaction(s): Vivid dreams.     No Clinical Screening - See Comments Other (See Comments)     History of swallowing sharp metallic objects. She should not be prescribed  "lancets due to posed risk of swallowing.         Review of systems:  A full 10 point review of systems was obtained and was negative except for the pertinent positives and negatives stated within the HPI.    Objective Findings:   Physical Exam:    Constitutional: Ht 1.575 m (5' 2\")   Wt 108 kg (238 lb)   BMI 43.53 kg/m    General: Alert, oriented.   Head: Atraumatic, normocephalic, no obvious abnormalities   Eyes: Sclera anicteric, no obvious conjunctival hemorrhage   Nose: Nares normal, no obvious malformation, no obvious rhinorrhea   Respiratory: Normal appearing respirations, no cough, no apparent distress  Musculoskeletal: Range of motion intact, no obvious strength deficit  Skin: No jaundice, no obvious rash  Neurologic: AAOx3, no obvious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

## 2024-11-13 NOTE — PROGRESS NOTES
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 GASTROSCOPY, DUODENOSCOPY (EGD), REMOVE FOREIGN BODY;   combined esophagoscopy, gastroscopy, duodenoscopy, REMOVE FOREIGN BODY ;  Surgeon: Lokesh Paula MD;  [facility]    ESOPHAGOSCOPY, GASTROSCOPY, DUODENOSCOPY (EGD), COMBINED N/A 10/6/2019    Procedure: ESOPHAGOGASTRODUODENOSCOPY (EGD) with fireign body removal x2, esophageal stent placement with floroscopy;  Surgeon: Timoteo Espana MD;  [facility]    ESOPHAGOSCOPY, GASTROSCOPY, DUODENOSCOPY (EGD), COMBINED N/A 12/2/2019    Procedure: Esophagogastroduodenoscopy with esophageal stent removal, esophogram;  Surgeon: Kailee Tena MD;  [facility]    ESOPHAGOSCOPY, GASTROSCOPY, DUODENOSCOPY (EGD), COMBINED N/A 12/17/2019    Procedure: ESOPHAGOGASTRODUODENOSCOPY, WITH FOREIGN BODY REMOVAL;  Surgeon: Pamela Perez MD;  [facility]    ESOPHAGOSCOPY, GASTROSCOPY, DUODENOSCOPY (EGD), COMBINED N/A 12/13/2019    Procedure: ESOPHAGOGASTRODUODENOSCOPY, WITH FOREIGN BODY REMOVAL;  Surgeon: Samia Stanton MD;  [facility]    ESOPHAGOSCOPY, GASTROSCOPY, DUODENOSCOPY (EGD), COMBINED N/A 12/28/2019    Procedure:  ESOPHAGOGASTRODUODENOSCOPY (EGD) Removal of Foreign Body X 2;  Surgeon: Huy Kelley MD;  [facility]    ESOPHAGOSCOPY, GASTROSCOPY, DUODENOSCOPY (EGD), COMBINED N/A 1/5/2020    Procedure: ESOPHAGOGASTRODUOENOSCOPY WITH FOREIGN BODY REMOVAL;  Surgeon: Pamela Perez MD;  [facility]    ESOPHAGOSCOPY, GASTROSCOPY, DUODENOSCOPY (EGD), COMBINED N/A 1/3/2020    Procedure: ESOPHAGOGASTRODUODENOSCOPY (EGD) REMOVAL OF FOREIGN BODY.;  Surgeon: Pamela Perez MD;  [facility]    ESOPHAGOSCOPY, GASTROSCOPY, DUODENOSCOPY (EGD), COMBINED N/A 1/13/2020    Procedure: ESOPHAGOGASTRODUODENOSCOPY (EGD) for foreign body removal;  Surgeon: Lokesh Paula MD;  [facility]    ESOPHAGOSCOPY, GASTROSCOPY, DUODENOSCOPY (EGD), COMBINED N/A 1/18/2020    Procedure: Diagnostic ESOPHAGOGASTRODUODENOSCOPY (EGD;  Surgeon: Lokesh Paula MD;  [facility]    ESOPHAGOSCOPY, GASTROSCOPY, DUODENOSCOPY (EGD), COMBINED N/A 3/29/2020    Procedure: UPPER ENDOSCOPY WITH FOREIGN BODY REMOVAL;  Surgeon: Doug Hansen MD;  [facility]    ESOPHAGOSCOPY, GASTROSCOPY, DUODENOSCOPY (EGD), COMBINED N/A 7/11/2020    Procedure: ESOPHAGOGASTRODUODENOSCOPY (EGD); Upper Endoscopy WITH FOREIGN BODY REMOVAL;  Surgeon: Lyndsey Mendoza DO;  [facility]    ESOPHAGOSCOPY, GASTROSCOPY, DUODENOSCOPY (EGD), COMBINED N/A 7/29/2020    Procedure: ESOPHAGOGASTRODUODENOSCOPY REMOVAL OF FOREIGN BODY;  Surgeon: Samia Stanton MD;  [facility]    ESOPHAGOSCOPY, GASTROSCOPY, DUODENOSCOPY (EGD), COMBINED N/A 8/1/2020    Procedure: ESOPHAGOGASTRODUODENOSCOPY, WITH FOREIGN BODY REMOVAL;  Surgeon: Pamela Perez MD;  [facility]    ESOPHAGOSCOPY, GASTROSCOPY, DUODENOSCOPY (EGD), COMBINED N/A 8/18/2020    Procedure: ESOPHAGOGASTRODUODENOSCOPY (EGD) for foreign body removal;  Surgeon: Pamela Perez MD;  [facility]    ESOPHAGOSCOPY, GASTROSCOPY, DUODENOSCOPY  (EGD), COMBINED N/A 8/27/2020    Procedure: ESOPHAGOGASTRODUODENOSCOPY (EGD) with foreign body removal;  Surgeon: Campbell Rogers MD;  [facility]    ESOPHAGOSCOPY, GASTROSCOPY, DUODENOSCOPY (EGD), COMBINED N/A 9/18/2020    Procedure: ESOPHAGOGASTRODUODENOSCOPY (EGD) with foreign body removal;  Surgeon: Dick Gillis MD;  [facility]    ESOPHAGOSCOPY, GASTROSCOPY, DUODENOSCOPY (EGD), COMBINED N/A 11/18/2020    Procedure: ESOPHAGOGASTRODUODENOSCOPY, WITH FOREIGN BODY REMOVAL;  Surgeon: Felipe Ulloa DO;  [facility]    ESOPHAGOSCOPY, GASTROSCOPY, DUODENOSCOPY (EGD), COMBINED N/A 11/28/2020    Procedure: ESOPHAGOGASTRODUODENOSCOPY (EGD);  Surgeon: Campbell Rogers MD;  [facility]    ESOPHAGOSCOPY, GASTROSCOPY, DUODENOSCOPY (EGD), COMBINED N/A 3/12/2021    Procedure: ESOPHAGOGASTRODUODENOSCOPY, WITH FOREIGN BODY REMOVAL using cold snare;  Surgeon: Marianna Rudolph MD;  Location: [facility]    ESOPHAGOSCOPY, GASTROSCOPY, DUODENOSCOPY (EGD), COMBINED N/A 12/10/2017    Procedure: ESOPHAGOGASTRODUODENOSCOPY (EGD) with foreign body removal;  Surgeon: Lila Sol MD;  Location: [facility];  Service:     ESOPHAGOSCOPY, GASTROSCOPY, DUODENOSCOPY (EGD), COMBINED N/A 2/13/2018    Procedure: ESOPHAGOGASTRODUODENOSCOPY (EGD);  Surgeon: Barney Pinto MD;  Location: [facility];  Service:     ESOPHAGOSCOPY, GASTROSCOPY, DUODENOSCOPY (EGD), COMBINED N/A 11/9/2018    Procedure: UPPER ENDOSCOPY, FOREIGN BODY REMOVAL;  Surgeon: Cristino Kelsey MD;  Location: [facility] OR;  Service: Gastroenterology    ESOPHAGOSCOPY, GASTROSCOPY, DUODENOSCOPY (EGD), COMBINED N/A 11/17/2018    Procedure: ESOPHAGOGASTRODUODENOSCOPY (EGD) with foreign body removal;  Surgeon: Gustavo Mathew MD;  Location: [facility];  Service: Gastroenterology    ESOPHAGOSCOPY, GASTROSCOPY, DUODENOSCOPY (EGD), COMBINED N/A 11/22/2018    Procedure: ESOPHAGOGASTRODUODENOSCOPY (EGD);  Surgeon:  Binu Vigil MD;  Location: [facility] OR;  Service: Gastroenterology    ESOPHAGOSCOPY, GASTROSCOPY, DUODENOSCOPY (EGD), COMBINED N/A 11/25/2018    Procedure: UPPER ENDOSCOPY TO REMOVE PAPER CLIPS;  Surgeon: Hria Jacobs MD;  Location: [facility];  Service: Gastroenterology    ESOPHAGOSCOPY, GASTROSCOPY, DUODENOSCOPY (EGD), COMBINED N/A 8/1/2021    Procedure: ESOPHAGOGASTRODUODENOSCOPY (EGD);  Surgeon: Binu Vigil MD;  [place] [facility]    ESOPHAGOSCOPY, GASTROSCOPY, DUODENOSCOPY (EGD), COMBINED N/A 7/31/2021    Procedure: ESOPHAGOGASTRODUODENOSCOPY (EGD);  Surgeon: Keith Quinn MD;  Location: [facility]    ESOPHAGOSCOPY, GASTROSCOPY, DUODENOSCOPY (EGD), COMBINED N/A 8/13/2021    Procedure: ESOPHAGOGASTRODUODENOSCOPY (EGD);  Surgeon: Gustavo Mathew MD;  Location: [facility]    ESOPHAGOSCOPY, GASTROSCOPY, DUODENOSCOPY (EGD), COMBINED N/A 8/13/2021    Procedure: ESOPHAGOGASTRODUODENOSCOPY (EGD) with foreign body removal;  Surgeon: Gustavo Mathew MD;  Location: [facility]    ESOPHAGOSCOPY, GASTROSCOPY, DUODENOSCOPY (EGD), COMBINED N/A 1/30/2022    Procedure: ESOPHAGOGASTRODUODENOSCOPY (EGD) FOREIGN BODY REMOVAL;  Surgeon: Bird Sethi MD;  [facility]: [facility]    ESOPHAGOSCOPY, GASTROSCOPY, DUODENOSCOPY (EGD), COMBINED N/A 2/3/2022    Procedure: ESOPHAGOGASTRODUODENOSCOPY (EGD), FOREIGN BODY REMOVAL;  Surgeon: Binu Vigil MD;  [facility] [facility]    ESOPHAGOSCOPY, GASTROSCOPY, DUODENOSCOPY (EGD), COMBINED N/A 2/7/2022    Procedure: ESOPHAGOGASTRODUODENOSCOPY (EGD) WITH FOREIGN BODY REMOVAL;  Surgeon: Darek Mendoza MD;  Location: [facility]    ESOPHAGOSCOPY, GASTROSCOPY, DUODENOSCOPY (EGD), COMBINED N/A 2/8/2022    Procedure: ESOPHAGOGASTRODUODENOSCOPY (EGD), foreign body removal;  Surgeon: Lyndsey Mendoza DO;  [facility]    ESOPHAGOSCOPY, GASTROSCOPY, DUODENOSCOPY (EGD), COMBINED N/A 2/15/2022    Procedure:  UPPER ESOPHAGOGASTRODUODENOSCOPY, WITH FOREIGN BODY REMOVAL AND USE OF BLANKENSHIP;  Surgeon: Samia Stanton MD;  [facility]    ESOPHAGOSCOPY, GASTROSCOPY, DUODENOSCOPY (EGD), COMBINED N/A 7/9/2022    Procedure: ESOPHAGOGASTRODUODENOSCOPY (EGD) with foreign body extraction;  Surgeon: Felipe Ulloa DO;  [facility]    ESOPHAGOSCOPY, GASTROSCOPY, DUODENOSCOPY (EGD), COMBINED N/A 7/29/2022    Procedure: ESOPHAGOGASTRODUODENOSCOPY (EGD) WITH FOREIGN BODY REMOVAL;  Surgeon: Pamela Perez MD;  [facility]    ESOPHAGOSCOPY, GASTROSCOPY, DUODENOSCOPY (EGD), COMBINED N/A 8/6/2022    Procedure: ESOPHAGOGASTRODUODENOSCOPY, WITH FOREIGN BODY REMOVAL;  Surgeon: Bety Nova MD;  [facility]    ESOPHAGOSCOPY, GASTROSCOPY, DUODENOSCOPY (EGD), COMBINED N/A 8/13/2022    Procedure: ESOPHAGOGASTRODUODENOSCOPY, WITH FOREIGN BODY REMOVAL using raptor device;  Surgeon: Brice Ramirez MD;  [facility]:  [facility]    ESOPHAGOSCOPY, GASTROSCOPY, DUODENOSCOPY (EGD), COMBINED N/A 8/24/2022    Procedure: ESOPHAGOGASTRODUODENOSCOPY (EGD);  Surgeon: Jeffy Bradley MD;  [facility]:  [facility]    ESOPHAGOSCOPY, GASTROSCOPY, DUODENOSCOPY (EGD), COMBINED N/A 9/17/2022    Procedure: ESOPHAGOGASTRODUODENOSCOPY (EGD), Foreign Body removal;  Surgeon: Pamela Perez MD;  [facility]    ESOPHAGOSCOPY, GASTROSCOPY, DUODENOSCOPY (EGD), COMBINED N/A 9/25/2022    Procedure: ESOPHAGOGASTRODUODENOSCOPY, WITH FOREIGN BODY REMOVAL;  Surgeon: Kash Griffin MD;  [facility]:  [facility]    ESOPHAGOSCOPY, GASTROSCOPY, DUODENOSCOPY (EGD), COMBINED N/A 10/23/2022    Procedure: ESOPHAGOGASTRODUODENOSCOPY (EGD) FOR REMOVAL OF FOREIGN BODY;  Surgeon: Barney Pinto MD;  Location: [facility] OR    ESOPHAGOSCOPY, GASTROSCOPY, DUODENOSCOPY (EGD), COMBINED N/A 11/3/2022    Procedure: ESOPHAGOGASTRODUODENOSCOPY (EGD) for foreign body removal;  Surgeon: Cruz Kumar MD;  Location: [facility] OR     ESOPHAGOSCOPY, GASTROSCOPY, DUODENOSCOPY (EGD), COMBINED N/A 11/29/2022    Procedure: ESOPHAGOGASTRODUODENOSCOPY (EGD);  Surgeon: Cristino Kelsey MD, MD;  Location: [facility] OR    ESOPHAGOSCOPY, GASTROSCOPY, DUODENOSCOPY (EGD), COMBINED N/A 12/8/2022    Procedure: ESOPHAGOGASTRODUODENOSCOPY (EGD) with foreign body removal;  Surgeon: Efrem Begum MD;  Location: [facility] OR    ESOPHAGOSCOPY, GASTROSCOPY, DUODENOSCOPY (EGD), COMBINED N/A 12/28/2022    Procedure: ESOPHAGOGASTRODUODENOSCOPY, WITH FOREIGN BODY REMOVAL;  Surgeon: Doug Hansen MD;  Location:  GI    ESOPHAGOSCOPY, GASTROSCOPY, DUODENOSCOPY (EGD), COMBINED N/A 1/20/2023    Procedure: ESOPHAGOGASTRODUODENOSCOPY (EGD);  Surgeon: Bety Nova MD;  Location:  GI    ESOPHAGOSCOPY, GASTROSCOPY, DUODENOSCOPY (EGD), COMBINED N/A 3/11/2023    Procedure: ESOPHAGOGASTRODUODENOSCOPY WITH FOREIGN BODY REMOVAL;  Surgeon: Cruz Kumar MD;  Location: [facility] OR    ESOPHAGOSCOPY, GASTROSCOPY, DUODENOSCOPY (EGD), COMBINED N/A 10/16/2023    Procedure: ESOPHAGOGASTRODUODENOSCOPY (EGD) WITH FOREIGN BODY REMOVAL;  Surgeon: Cruz Kumar MD;  Location: [facility] OR    ESOPHAGOSCOPY, GASTROSCOPY, DUODENOSCOPY (EGD), COMBINED N/A 10/29/2023    Procedure: ESOPHAGOGASTRODUODENOSCOPY, WITH FOREIGN BODY REMOVAL;  Surgeon: Kash Griffin MD;  Location:  [facility]    ESOPHAGOSCOPY, GASTROSCOPY, DUODENOSCOPY (EGD), COMBINED N/A 3/29/2024    Procedure: ESOPHAGOGASTRODUODENOSCOPY WITH BIOSPIES;  Surgeon: Gustavo Mathew MD;  Location: [facility] OR    ESOPHAGOSCOPY, GASTROSCOPY, DUODENOSCOPY (EGD), DILATATION, COMBINED N/A 8/30/2021    Procedure: ESOPHAGOGASTRODUODENOSCOPY, WITH DILATION (mngi);  Surgeon: Pat Cervantes MD;  Location:  OR    EXAM UNDER ANESTHESIA ANUS N/A 1/10/2017    Procedure: EXAM UNDER ANESTHESIA ANUS;  Surgeon: Annmarie Haynes MD;  Location:  OR    EXAM UNDER ANESTHESIA  RECTUM N/A 7/19/2018    Procedure: EXAM UNDER ANESTHESIA RECTUM;  EXAM UNDER ANESTHESIA, REMOVAL OF RECTAL FOREIGN BODY;  Surgeon: Annmarie Haynes MD;  [facility]    HC REMOVE FECAL IMPACTION OR FB W ANESTHESIA N/A 12/18/2016    Procedure: REMOVE FECAL IMPACTION/FOREIGN BODY UNDER ANESTHESIA;  Surgeon: Soham Cano MD;  Location: [facility]    IR CVC TUNNEL PLACEMENT > 5 YRS OF AGE  4/2/2024    IR CVC TUNNEL REMOVAL RIGHT  4/16/2024    IR LUMBAR PUNCTURE  8/14/2024    KNEE SURGERY Right     KNEE SURGERY - removed a small tissue mass from knee Right     LAPAROSCOPIC ABLATION ENDOMETRIOSIS      LAPAROTOMY EXPLORATORY N/A 1/10/2017    Procedure: LAPAROTOMY EXPLORATORY;  Surgeon: Annmarie Haynes MD;  [facility]    LAPAROTOMY EXPLORATORY  09/11/2019    Manual manipulation of bowel to remove pill bottle in rectum    lymph nodes removed from neck; benign  age 7    MAMMOPLASTY REDUCTION Bilateral     OTHER SURGICAL HISTORY      foreign body anus removal    PICC DOUBLE LUMEN PLACEMENT  4/25/2024    PA ESOPHAGOGASTRODUODENOSCOPY TRANSORAL DIAGNOSTIC N/A 1/5/2019    Procedure: ESOPHAGOGASTRODUODENOSCOPY (EGD) with foreign body removal using raptor;  Surgeon: Lila Sol MD;  Location: [facility];  Service: Gastroenterology    PA ESOPHAGOGASTRODUODENOSCOPY TRANSORAL DIAGNOSTIC N/A 1/25/2019    Procedure: ESOPHAGOGASTRODUODENOSCOPY (EGD) removal of foreign body;  Surgeon: Binu Vigil MD;  Location: [facility];  Service: Gastroenterology    PA ESOPHAGOGASTRODUODENOSCOPY TRANSORAL DIAGNOSTIC N/A 1/31/2019    Procedure: ESOPHAGOGASTRODUODENOSCOPY (EGD);  Surgeon: Siddharth Spears MD;  Location: [facility] OR;  Service: Gastroenterology    PA ESOPHAGOGASTRODUODENOSCOPY TRANSORAL DIAGNOSTIC N/A 8/17/2019    Procedure: ESOPHAGOGASTRODUODENOSCOPY (EGD) with foreign body removal;  Surgeon: Darek Lucero MD;  Location: [facility];  Service:  Gastroenterology    HI ESOPHAGOGASTRODUODENOSCOPY TRANSORAL DIAGNOSTIC N/A 9/29/2019    Procedure: ESOPHAGOGASTRODUODENOSCOPY (EGD) with foreign body removal;  Surgeon: Bailey Arnold MD;  Location: [facility];  Service: Gastroenterology    HI ESOPHAGOGASTRODUODENOSCOPY TRANSORAL DIAGNOSTIC N/A 10/3/2019    Procedure: ESOPHAGOGASTRODUODENOSCOPY (EGD), REMOVAL OF FOREIGN BODY;  Surgeon: Chris Lira MD;  Location: [facility];  Service: Gastroenterology    HI ESOPHAGOGASTRODUODENOSCOPY TRANSORAL DIAGNOSTIC N/A 10/6/2019    Procedure: ESOPHAGOGASTRODUODENOSCOPY (EGD) with attempted foreign body removal;  Surgeon: Felipe Connolly MD;  Location: [facility];  Service: Gastroenterology    HI ESOPHAGOGASTRODUODENOSCOPY TRANSORAL DIAGNOSTIC N/A 12/15/2019    Procedure: ESOPHAGOGASTRODUODENOSCOPY (EGD) with foreign body removal;  Surgeon: Jeffy Zuñiga MD;  Location: [facility];  Service: Gastroenterology    HI ESOPHAGOGASTRODUODENOSCOPY TRANSORAL DIAGNOSTIC N/A 12/17/2019    Procedure: ESOPHAGOGASTRODUODENOSCOPY (EGD) with attempted foreign body removal;  Surgeon: Felipe Connolly MD;  Location: [facility];  Service: Gastroenterology    HI ESOPHAGOGASTRODUODENOSCOPY TRANSORAL DIAGNOSTIC N/A 12/21/2019    Procedure: ESOPHAGOGASTRODUODENOSCOPY (EGD) FOR FROEIGN BODY REMOVAL;  Surgeon: Binu Vigil MD;  Location: [facility];  Service: Gastroenterology    HI ESOPHAGOGASTRODUODENOSCOPY TRANSORAL DIAGNOSTIC N/A 7/22/2020    Procedure: ESOPHAGOGASTRODUODENOSCOPY (EGD);  Surgeon: Bailey Arnold MD;  Location: [facility] OR;  Service: Gastroenterology    HI ESOPHAGOGASTRODUODENOSCOPY TRANSORAL DIAGNOSTIC N/A 8/14/2020    Procedure: ESOPHAGOGASTRODUODENOSCOPY (EGD) FOREIGN BODY REMOVAL;  Surgeon: Jeffy Zuñiga MD;  Location: [facility] OR;  Service: Gastroenterology    HI ESOPHAGOGASTRODUODENOSCOPY TRANSORAL DIAGNOSTIC N/A 2/25/2021    Procedure:  ESOPHAGOGASTRODUODENOSCOPY (EGD) with foreign body reoval;  Surgeon: Bird Sethi MD;  Location: [facility];  Service: Gastroenterology    OR ESOPHAGOGASTRODUODENOSCOPY TRANSORAL DIAGNOSTIC N/A 4/19/2021    Procedure: ESOPHAGOGASTRODUODENOSCOPY (EGD);  Surgeon: Libia Rose MD;  Location: [facility];  Service: Gastroenterology    OR SURG DIAGNOSTIC EXAM, ANORECTAL N/A 2/5/2020    Procedure: EXAM UNDER ANESTHESIA, Flexible Sigmoidoscopy, Retrieval of Foreign Body;  Surgeon: Sasha Ivan MD;  Location: [facility] OR;  Service: General    RELEASE CARPAL TUNNEL Bilateral     RELEASE CARPAL TUNNEL Bilateral     REMOVAL, FOREIGN BODY, RECTUM N/A 7/21/2021    Procedure: MANUAL RETREIVALOF FOREIGN OBJECT- RECTUM.;  Surgeon: Aleksandra Gerber MD;  Location: [facility]    SIGMOIDOSCOPY FLEXIBLE N/A 1/10/2017    Procedure: SIGMOIDOSCOPY FLEXIBLE;  Surgeon: Annmarie Haynes MD;  Location:  OR    SIGMOIDOSCOPY FLEXIBLE N/A 5/8/2018    Procedure: SIGMOIDOSCOPY FLEXIBLE;  flex sig with foreign body removal using snare and rattooth forcep;  Surgeon: Soham Cano MD;  Location: [facility]    SIGMOIDOSCOPY FLEXIBLE N/A 2/20/2019    Procedure: Exam under anesthesia Colonoscopy with attempted  removal of rectal foreign body;  Surgeon: Estrada Chávez MD;  Location:  OR       Social History     Socioeconomic History    Marital status: Single     Spouse name: Not on file    Number of children: Not on file    Years of education: Not on file    Highest education level: Not on file   Occupational History    Occupation: On disability   Tobacco Use    Smoking status: Never    Smokeless tobacco: Never   Vaping Use    Vaping status: Not on file   Substance and Sexual Activity    Alcohol use: No     Alcohol/week: 0.0 standard drinks of alcohol    Drug use: No    Sexual activity: Not Currently     Partners: Male     Birth control/protection: I.U.D.     Comment: IUD - Mirena - placed July, 2015   Other Topics  Concern    Parent/sibling w/ CABG, MI or angioplasty before 65F 55M? Not Asked   Social History Narrative    Single.    Living in independent living portion of [employer].    On disability.    No regular exercise.      Social Drivers of Health     Financial Resource Strain: Low Risk  (6/16/2023)    [facility], [facility]    Financial Resource Strain     Difficulty of Paying Living Expenses: 3     Difficulty of Paying Living Expenses: Not on file   Food Insecurity: No Food Insecurity (11/1/2023)    Received from [facility]    Food Insecurity     Do you worry your food will run out before you are able to buy more?: 1   Transportation Needs: No Transportation Needs (11/1/2023)    [facility]    Transportation Needs     Does lack of transportation keep you from medical appointments?: 1     Does lack of transportation keep you from work, meetings or getting things that you need?: 1   Physical Activity: Not on file   Stress: Not on file   Social Connections: Socially Integrated (11/1/2023)    Received from [facility]    Social Connections     Do you often feel lonely or isolated from those around you?: 0   Interpersonal Safety: Not At Risk (11/12/2024)    Received from [facility]    Humiliation, Afraid, Rape, and Kick questionnaire     Fear of Current or Ex-Partner: No     Emotionally Abused: No     Physically Abused: No     Sexually Abused: No   Housing Stability: Low Risk  (11/1/2023)    [facility]    Housing Stability     What is your housing situation today?: 1       Family History   Problem Relation Age of Onset    Diabetes Type 2  Maternal Grandmother     Diabetes Type 2  Paternal Grandmother     Breast Cancer Paternal Grandmother     Cerebrovascular Disease  Father 53    Myocardial Infarction No family hx of     Coronary Artery Disease Early Onset No family hx of     Ovarian Cancer No family hx of     Colon Cancer No family hx of     Depression Mother     Anxiety Disorder Mother      Family history reviewed and edited as appropriate    Medications and Allergies:     Outpatient Encounter Medications as of 11/13/2024   Medication Sig Dispense Refill    acetaminophen (TYLENOL) 500 MG tablet Take 2 tablets (1,000 mg) by mouth every 6 hours as needed for mild pain 60 tablet 0    acetaminophen (TYLENOL) 500 MG tablet Take 2 tablets (1,000 mg) by mouth every 6 hours as needed for pain or fever 60 tablet 0    albuterol (PROAIR HFA/PROVENTIL HFA/VENTOLIN HFA) 108 (90 Base) MCG/ACT inhaler Inhale 2 puffs into the lungs every 6 hours as needed for shortness of breath / dyspnea or wheezing 18 g 0    albuterol (PROVENTIL) (2.5 MG/3ML) 0.083% neb solution Take 1 vial (2.5 mg) by nebulization every 6 hours as needed for shortness of breath or wheezing 90 mL 0    Apixaban Starter Pack (ELIQUIS DVT/PE STARTER PACK) 5 MG TBPK Take 2 tablets (10 mg) by mouth twice daily for 7 days then take 1 tablet (5 mg) twice daily thereafter      benzonatate (TESSALON) 100 MG capsule Take 1 capsule (100 mg) by mouth 3 times daily as needed for cough 12 capsule 0    cetirizine (ZYRTEC) 10 MG tablet Take 1 tablet (10 mg) by mouth daily 30 tablet 0    Cholecalciferol (D3 HIGH POTENCY) 25 MCG (1000 UT) CAPS Take 50 mcg by mouth daily      clonazePAM (KLONOPIN) 0.5 MG tablet Take 1 tablet (0.5 mg) by mouth daily as needed for anxiety 7 tablet 0    cyclobenzaprine (FLEXERIL) 10 MG tablet Take 1 tablet (10 mg) by mouth 3 times daily as needed for muscle spasms 20 tablet 0    dicyclomine (BENTYL) 20 MG tablet Take 1 tablet (20 mg) by mouth 4 times daily as needed (abdominal cramps, diarrhea) 12 tablet 0    escitalopram (LEXAPRO) 10 MG tablet Take 10 mg by mouth      famotidine (PEPCID) 20 MG tablet Take 1  tablet (20 mg) by mouth 2 times daily as needed (acid reflux). 20 tablet 0    ferrous sulfate (FEROSUL) 325 (65 Fe) MG tablet Take 1 tablet (325 mg) by mouth daily (with breakfast) 30 tablet 0    haloperidol (HALDOL) 2 MG tablet Take 1 tablet (2 mg) by mouth 3 times daily as needed (uncontrolled vomiting/abdominal pain) 9 tablet 0    hydrOXYzine HCl (ATARAX) 25 MG tablet Take 25 mg by mouth      insulin pen needle (31G X 8 MM) 31G X 8 MM miscellaneous Use 1 pen needles daily or as directed. 100 each 1    levofloxacin (LEVAQUIN) 750 MG tablet Take 1 tablet (750 mg) by mouth daily for 6 days. 6 tablet 0    montelukast (SINGULAIR) 10 MG tablet Take 10 mg by mouth every evening      norethindrone (AYGESTIN) 5 MG tablet Take 5 mg by mouth daily      ondansetron (ZOFRAN ODT) 4 MG ODT tab Take 1 tablet (4 mg) by mouth every 8 hours as needed for nausea 15 tablet 0    ondansetron (ZOFRAN-ODT) 4 MG ODT tab Take 1 tablet (4 mg) by mouth every 8 hours as needed for nausea 15 tablet 0    pantoprazole (PROTONIX) 40 MG EC tablet Take 40 mg by mouth daily      polyethylene glycol (MIRALAX) 17 GM/Dose powder Take 17 g by mouth daily as needed for constipation      PSYLLIUM PO Take 1 tsp by mouth daily      Respiratory Therapy Supplies (NEBULIZER) BRENDAN Nebulizer device.  Albuterol nebulization every 2 hours as needed for shortness of breath or wheezing. 1 each 0    Semaglutide, 1 MG/DOSE, (OZEMPIC) 4 MG/3ML pen Inject 1 mg subcutaneously every 7 days. 3 mL 3    Semaglutide, 2 MG/DOSE, (OZEMPIC) 8 MG/3ML pen Inject 2 mg Subcutaneous every 7 days 9 mL 1    valACYclovir (VALTREX) 1000 mg tablet Take 2,000 mg by mouth 2 times daily as needed Take 2 tablets by mouth two times daily for one day. Use as needed at onset of cold sore.       No facility-administered encounter medications on file as of 11/13/2024.        Allergies   Allergen Reactions    Amoxicillin-Pot Clavulanate Other (See Comments), Swelling and Rash     PN: facial  "swelling, left side. Also had cortisone injection the same day as she started the Augmentin.          Influenza Vaccines Other (See Comments) and Nausea and Vomiting     HUT Reaction: Nausea And Vomiting; HUT Reaction Type: Intolerance; HUT Severity: Low; HUT Noted: [id number]    Latex Rash           Oseltamivir Hives    Penicillins Anaphylaxis    Vancomycin Itching, Swelling and Rash     Flushed face, very itchy    Hydrocodone Nausea and Vomiting and GI Disturbance     vomiting for days    Blood-Group Specific Substance Other (See Comments)     Patient has an anti-Cw and non-specific antibodies. Blood product orders may be delayed. Draw one red top and two purple top tubes for all type/screen/crossmatch orders.  Patient has anti-Cw, anti-K ([place]), Warm auto and nonspecific antibodies. Blood products may be delayed. Draw patient 24 hours prior to transfusion. Draw one red top and two purple top tubes for all type and screen orders.    Clavulanic Acid Angioedema    Haemophilus B Polysaccharide Vaccine Nausea and Vomiting    Oxycodone Swelling    Adhesive Tape Rash     Silicone type  Adhesive allergy      Band-Aid Anti-Itch      Other reaction(s): adhesive allergy    Cephalosporins Rash    Lamotrigine Rash     Possibly associated with Lamictal.     Naltrexone Other (See Comments)     Reaction(s): Vivid dreams.    No Clinical Screening - See Comments Other (See Comments)     History of swallowing sharp metallic objects. She should not be prescribed lancets due to posed risk of swallowing.         Review of systems:  A full 10 point review of systems was obtained and was negative except for the pertinent positives and negatives stated within the HPI.    Objective Findings:   Physical Exam:    Constitutional: Ht 1.575 m (5' 2\")   Wt 108 kg (238 lb)   BMI 43.53 kg/m    General: Alert, oriented.   Head: Atraumatic, normocephalic, no obvious abnormalities   Eyes: Sclera anicteric, no obvious conjunctival hemorrhage   Nose: " Nares normal, no obvious malformation, no obvious rhinorrhea   Respiratory: Normal appearing respirations, no cough, no apparent distress  Musculoskeletal: Range of motion intact, no obvious strength deficit  Skin: No jaundice, no obvious rash  Neurologic: AAOx3, no obvious neurologic abnormality.   Psychiatric: Normal Affect, appropriate mood  Extremities: No obvious edema, no obvious malformation     Labs, Radiology, Pathology     Lab Results   Component Value Date    WBC 9.6 11/11/2024    WBC 11.6 (H) 10/12/2024    WBC 8.9 08/26/2024    HGB 15.3 11/11/2024    HGB 13.8 10/12/2024    HGB 14.1 08/26/2024     11/11/2024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

## 2024-11-13 NOTE — PROGRESS NOTES
"Video-Visit Details    Type of service:  Video Visit    Video Start Time: 9:30 AM  Video End Time: 9:50 AM     Originating Location (pt. Location): Home    Distant Location (provider location): Offsite (providers home) [facility] [place]     Platform [facility] for [facility]: [facility]    Return Nutrition Consultation Note    Reason For Visit: Nutrition Assessment    Nevin Alvarado is a 34 year old presenting today for return nutrition medical weight management consult.  Patient is accompanied by self.     Pt referred by Sharon Toro NP on September 12, 2023.  CO-MORBIDITIES OF OBESITY INCLUDE:        9/12/2023    12:48 PM   --   I have the following health issues associated with obesity Type II Diabetes    High Blood Pressure    Sleep Apnea    Polycystic Ovarian Syndrome    GERD (Reflux)    Fatty Liver    Asthma    Stress Incontinence     SUPPORT:      9/12/2023    12:48 PM   Support System Reviewed With Patient   Who do you have in your support network that can be available to help you for the first 2 weeks after surgery? I live in a group home with 24 hour staff, mom   Who can you count on for support throughout your weight loss surgery journey? a friend, and my therapist     ANTHROPOMETRICS:  Initial Consult Weight: 309 lb on 9/13/23  Estimated body mass index is 43.53 kg/m  as calculated from the following:    Height as of this encounter: 1.575 m (5' 2\").    Weight as of this encounter: 108 kg (238 lb).  Current Weight: 238 lb per pt report     Wt Readings from Last 5 Encounters:   11/15/24 108 kg (238 lb)   11/13/24 108 kg (238 lb)   11/11/24 108 kg (238 lb)   10/15/24 108 kg (238 lb)   10/12/24 106.1 kg (234 lb)         9/12/2023    12:48 PM   --   I have tried the following methods to lose weight Watching portions or calories    Exercise    Pre packaged meals ex: Nutrisystem    OTC Medications    Prescription Medications         9/12/2023    12:48 PM   Weight Loss Questions " Reviewed With Patient   How long have you been overweight? Since late teens through early 20's     MEDICATION FOR WEIGHT LOSS:  Ozempic 1 mg - This dose has been going well    Hx of self harm- swallowing thing- started after she was treated for anorexia when taking topiramate- in 6 months has only had one day of self harm- this was 2 weeks ago and instead of swallowing she inserted something per rectum. - Followed by MASON Potter PA-C, PE in 2019 after esophageal perforation repair     VITAMIN/MINERAL SUPPLEMENTS:  Iron, Vitamin B12, Vitamin D     NUTRITION HISTORY:  Food allergies:NKFA  Food intolerances: None  Previous experience with diet modification for weight loss: Nutrisystem, prescription medications, exercise, watching calories or portions     Has been meal planning for the past 3 months. Likes chicken, turkey, shrimp.      October 2023: Eating 3 meals per day. Lunch and dinner meals have been chili or mediterranean orzo salad with artichokes more recently. Drinks mostly water, Cup of coffee, [employer]. Doesn't drink soda or juice. Uses a walker for neuropathy. Walking around more often/standing longer which has been an improvement.     November 2023: Reports she has been sick a lot recently. Sometimes feels she has to force herself to eat. Currently has a cold. Working with a GI doctor right now also as she has been having some GI symptoms after eating certain foods. Reports she had another self harm episode unfortunately, feels this will delay her chances for surgery.     January 2024: Had been dealing with illness recently which set her back a little bit on her goals. Has been using Wegovy. Feels since starting the Wegovy her face gets puffy or red blotches on it, plans to keep a close eye on it before deciding to stop. Had been eating very well but finding more temptations now. Feels she is not cooking as often and doing more frozen meals.  Hasn't been using walker for a few weeks.      April 2024: Since visiting Sharon nutrition has been off and on since she was in the hospital and multiple trips to the ED. Currently being treated for pneumonia, PE, depression and anxiety. Reports she continues to have constipation/diarrhea on and off. Has Miralax as needed. Also has enema prescription also. Nutrition has been very challenging over the last month due to being in the hospital for 15 days. She did continue to do her Wegovy injections while in the hospital. Plan is to focus on getting healthy and feeling better then will work on her good eating habits again.      June 2024: Things have been going a lot better for patient since last visit. She switched from Wegovy to Ozempic over the last month. Eating 2-3 meals a day. Bowel habits have improved since last RD visit. Physical activity has gotten a lot better with the weight loss. Does not use walker anymore. Walking around a lot. Also spoke with a diabetes educator in May.     August 2024: Psychologist recommended patient not have surgery. Patient is ok with this and is happy with her weight loss on AOMs.     Last few months have been harder due to health conditions she has been dealing with and some stress around her guardianship and housing. She is trying her best to eat healthfully but hasn't found much interest in cooking. If her housing situation changes she is going to look into seeing if she can get Mom's Meals. Trying to increase her fluid intake. Will have constipation at times but feels this has gotten a little better since her last trip to the ED.     Physical activity - will walk instead of using the scooter at stores.     Per Sharon Toro NP's recent note: Tolerating ozempic 1mg well. Disinterest in food, nausea/ vomiting and constipation have resolved. Appetite okay. Intake has been limited by new dental issues that have been going on for the last several weeks. She should be done now with dental procedures so discussed getting back on  track with adequate protein and hydration. Stressed importance of not skipping meals, adequate protein and hydration.     November 2024: Moving in 3 weeks. Patient is her own guardian now.  Cooking your own meals. On the Ozempic 1 mg dose. Has been tolerating that dose. Has been dealing with constipation. Eating 3 small meals a day. Prioritizing protein the best she can. Doing better with her fluid intake now that she can drink from straws again. Averaging about 30 oz of water daily.     Physical activity - getting easier. Able to walk more, can get through the stores a lot easier when shopping.     Has a gym in her new apartment building.         9/12/2023    12:48 PM   Recall Diet Questions Reviewed With Patient   Describe what you typically consume for breakfast (typical or most recent) eggs and hash browns, homemade waffles, laith pudding with fruit   Describe what you typically consume for lunch (typical or most recent) homemade lunchables, some pasta or chicken   Describe what you typically consume for supper (typical or most recent) low calorie meal prep meals   Describe what you typically consume as snacks (typical or most recent) cheese sticks, fruit jerky, fruits/vegetables   How many ounces of water, or other low calorie drinks, do you drink daily (8 oz=1 glass)? 48 oz   How many ounces of caffeine (coffee, tea, pop) do you drink daily (8 oz=1 glass)? 16 oz   How many ounces of carbonated (pop, beer, sparkling water) drinks do you drinky daily (8 oz=1 glass)? 0 oz   How many ounces of juice, pop, sweet tea, sports drinks, protein drinks, other sweetened drinks, do you drink daily (8 oz=1 glass)? 0 oz   How many ounces of milk do you drink daily (8 oz=1 glass) 0 oz   How often do you drink alcohol? Never           9/12/2023    12:48 PM   Eating Habits   What foods do you crave? ice cream, chocolate, sometimes just salty chips           9/12/2023    12:48 PM   Dining Out History Reviewed With Patient   How  often do you dine out? Rarely.   Where do you dine out? (select all that apply) take out   What types of food do you order when you dine out? jitendra verdin     EXERCISE:        7/20/2024     9:33 AM   --   How often do you exercise? 1 to 2 times per week   What is the duration of your exercise (in minutes)? 15 Minutes   What types of exercise do you do? walking   What keeps you from being more active? Pain     NUTRITION DIAGNOSIS:  Obesity r/t long history of positive energy balance aeb BMI >30 kg/m2.    INTERVENTION:  Intervention Provided/Education Provided on post-op diet guidelines, vitamins/minerals essential post-operatively, GI anatomy of bariatric surgeries, ways to help prepare for post-op diet guidelines pre-operatively, portion/calorie-control, mindful eating and sources of protein.  Patient demonstrates understanding.     Personal barriers to making and continuing required life changes have been identified, and strategies to overcome those barriers have been recommended AND family and social supports have been assessed and strategies to strengthen those supports have been recommended.    Provided pt with list of goals, RD contact information and resources listed below via [employer].           9/12/2023    12:48 PM   Questions Reviewed With Patient   How ready are you to make changes regarding your weight? Number 1 = Not ready at all to make changes up to 10 = very ready. 10   How confident are you that you can change? 1 = Not confident that you will be successful making changes up to 10 = very confident. 7     Expected Engagement: good    GOALS:  1) Continue to eat 3 small meals daily  2) Have a good protein source at all meals  3) Keep up with hydration, aim for 64 oz of fluid daily.     Eating Well on a Budget: https://www.fvfiles.com/081884.pdf    Cost friendly protein sources   Peanut Butter  Eggs  Edamame  Canned Tuna  Canned [place]  Greek Yogurt  Sunflower Seeds  Black Beans  Cottage  Cheese  Lentils  Oats  Milk  Pumpkin Seeds  Ground Turkey  Tempeh    Hunger Solutions:   Call the [place] Food Help Line at 1-584.325.7028 to talk with a specialist in community and government food assistance programs. They can help you sign-up for SNAP benefits, find food malone, food shelves and free food in your community and help refer you to any other community assistance programs that you qualify for.   https://www.hungersolutions.org/find-help/    Supplemental Nutrition Assistance Program (SNAP):  [place]://[place].gov/dhs/people-we-serve/adults/economic-assistance/food-nutrition/programs-and-services/supplemental-nutrition-assistance-program.jsp    [facility]:  To find a map of food shelves and food distribution pop-ups. Visit www.[facility].gov/foodshelves    Market [place] Program: Spend $10 of EBT, get $10 free at [employer].  To find map of farmers markets:  https://www.Personal Style FindersoNavmiiions.org/programs/market-bucks/farmersmarkets/    SpendSmart,Eat Smart ([place])  Recipe ideas that are suppose to be more affordable  Calculator that allows you to compare product prices   https://spendsmart.extension.[facility]     Fare For All (38 locations in [place]):  Provides discounted groceries. Up to 40% off retail pricing. You can preorder and  or buy in person, depending on the site.   https://fareforall.Tunessence.org/    [place] [employer]:  Discounted fresh produce, dairy and lean protein. Bus travels to underserved neighborhoods. Anyone can purchase from the mobile market.   https://www.Tunessence.org/groceries/[facility]-[employer] Tag-[employer]/    [employer] (Online)  Get organic produce and sustainably sourced groceries delivered at up to 40% off grocery store prices.  https://www.Mashed Pixel.FoxyTasks      [facility] ([place])  Fresh Produce Distribution Events and Free Food Markets in [place].   https://[facility].org/programs/food-support/    [facility]  and Wellness Food Programs ([facility]):  https://[facility].org/helping-our-neighbors/support-everyday-life/community-food-shelf  [address] [facility] [facility]    Food Shelf: Monday - Thursday, 11:00 a.m. - 4:00 p.m.  o An ID is helpful but not required.  o You may visit once per calendar month -anytime during a month.  o Items include meat, dairy, bread, and other food, hygiene, cleaning supplies and more.  [employer] [place]: You may visit once per day, Monday, Tuesday, Wednesday, Thursday 9:00 a.m. to 11:00 a.m.  o Free fruit, vegetables, salads, and deli items  Nutrition Assistance Program for Seniors (NAPS or  the senior box ).  Eligibility: at least 60 years old & 130% Federal Poverty Guidelines.  To apply, call [place] [phone].  Free Fresh Food Fridays - Summer Outdoor Distribution:  Fruits & vegetables at [place], 2nd & 4th Fridays 9:30 a.m.  May - September, rain or shine    [place] Food Shelves ([place]):  https://Contour.org/food-shelves/  [place] Food Shelf  [address], [place] [id number] [phone]    [facility] Food Shelf  [address] (at [facility]), [place], [place] [id number] [phone]    Foodmobile - Mobile Food Shelf  Foodmobiles travel throughout [place] and the [facility] to bring nutritious food to areas of high need. See list of locations here: https://Contour.org/events/    [facility] - Community Food Distribution ([facility]):  https://the[facility]west Medical CenterVouchedForundation.org/programs/nutritional-services/  [facility], Tuesdays 3-5PM  [address], [place] [address]     [facility], Fridays 12-2PM  [address] [place] [address]     [facility] Food Distribution, Mondays 1-2PM  [address] [place] [address]    [place] Students:  https://aide.[facility].edu/food-pantry  -[employer] is open every other  "week during the semester. It's open on Tuesdays and Wednesdays from 12 - 5 pm and is located on the 1st floor of [facility] ([address]), across from the movie theater.   - Any student can visit the food pantry up to two times per month, and only once per week when the pantry is open.     Too Good To Go:   An pavan that lets users buy discounted \"Surprise Bags\" of unsold food from local stores, bakeries, and restaurants. The bags typically contain a variety of food/meals and help to reduce food waste while saving money.    Follow Up: February 20     Time spent with patient: 20 minutes.  Nevin Birmingham RD, LD      "

## 2024-11-13 NOTE — TELEPHONE ENCOUNTER
General Call    Contacts       Contact Date/Time Type Contact Phone/Fax    11/13/2024 02:31 PM CST Phone (Incoming) Nevin Alvarado (Self) [phone] (M)          Reason for Call:  ozempic refill    What are your questions or concerns:  pt states she lost all her ozempic when her refrigerator went out and needs a refill, would like a 3 month supply      Could we send this information to you in [employer] or would you prefer to receive a phone call?:   Patient would prefer a phone call   Okay to leave a detailed message?: No at Cell number on file:    Telephone Information:   Mobile [phone]

## 2024-11-13 NOTE — NURSING NOTE
Current patient location: [address] [facility] [address]    Is the patient currently in the state of [place]? YES    Visit mode:VIDEO    If the visit is dropped, the patient can be reconnected by:VIDEO VISIT: Send to e-mail at: [email or web address]    Will anyone else be joining the visit? NO  (If patient encounters technical issues they should call [phone]    Are changes needed to the allergy or medication list? No    Are refills needed on medications prescribed by this physician? NO    Rooming Documentation:  Questionnaire(s) completed    Reason for visit: ARMIDA ANTONIO

## 2024-11-14 RX ORDER — PREGABALIN 100 MG/1
100 CAPSULE ORAL 3 TIMES DAILY
COMMUNITY

## 2024-11-14 RX ORDER — SODIUM PHOSPHATE, DIBASIC AND SODIUM PHOSPHATE, MONOBASIC 7; 19 G/230ML; G/230ML
1 ENEMA RECTAL PRN
COMMUNITY

## 2024-11-14 RX ORDER — PREGABALIN 100 MG/1
100 CAPSULE ORAL 2 TIMES DAILY
COMMUNITY
End: 2024-11-14

## 2024-11-14 NOTE — TELEPHONE ENCOUNTER
Patient Returning Call    Reason for call:  patient called back regarding her Ozempic dosing    Information relayed to patient:  Patient is looking for clarification as higher dose was sent to her pharmacy. Patient is demanding a call back in the next hour before she gets a ride over to the pharmacy    Patient has additional questions:  Please call patient asap, patient is upset that she isn't getting calls back. Patient is asking for a direct phone number for the nurses.       Could we send this information to you in [facility] or would you prefer to receive a phone call?:   Patient would prefer a phone call   Okay to leave a detailed message?: Yes at Cell number on file:    Telephone Information:   Mobile [phone]

## 2024-11-15 ENCOUNTER — VIRTUAL VISIT (OUTPATIENT)
Dept: ENDOCRINOLOGY | Facility: CLINIC | Age: 34
End: 2024-11-15
Attending: NURSE PRACTITIONER
Payer: COMMERCIAL

## 2024-11-15 VITALS — BODY MASS INDEX: 43.79 KG/M2 | HEIGHT: 62 IN | WEIGHT: 238 LBS

## 2024-11-15 DIAGNOSIS — E66.9 OBESITY: ICD-10-CM

## 2024-11-15 DIAGNOSIS — Z71.3 NUTRITIONAL COUNSELING: Primary | ICD-10-CM

## 2024-11-15 DIAGNOSIS — E11.9 TYPE 2 DIABETES MELLITUS WITHOUT COMPLICATION, WITHOUT LONG-TERM CURRENT USE OF INSULIN (H): ICD-10-CM

## 2024-11-15 ASSESSMENT — PAIN SCALES - GENERAL: PAINLEVEL_OUTOF10: NO PAIN (0)

## 2024-11-15 NOTE — PATIENT INSTRUCTIONS
Jay Rooney,     Follow-up with RD on February 20.     Thank you,    Nevin Birmingham, SIDNEY, LD  If you would like to schedule or reschedule an appointment with the RD, please call [phone]    Nutrition Goals  1) Continue to eat 3 small meals daily  2) Have a good protein source at all meals  3) Keep up with hydration, aim for 64 oz of fluid daily.     Eating Well on a Budget: https://www.fvfiles.com/270853.pdf    Cost friendly protein sources   Peanut Butter  Eggs  Edamame  Canned Tuna  Canned [place]  Greek Yogurt  Sunflower Seeds  Black Beans  Cottage Cheese  Lentils  Oats  Milk  Pumpkin Seeds  Ground Turkey  Tempeh    Hunger Solutions:   Call the [place] Food Help Line at 1-720.239.7901 to talk with a specialist in community and government food assistance programs. They can help you sign-up for SNAP benefits, find food malone, food shelves and free food in your community and help refer you to any other community assistance programs that you qualify for.   https://www.hungersolutions.org/find-help/    Supplemental Nutrition Assistance Program (SNAP):  [place]://[place].gov/dhs/people-we-serve/adults/economic-assistance/food-nutrition/programs-and-services/supplemental-nutrition-assistance-program.jsp    [facility]:  To find a map of food shelves and food distribution pop-ups. Visit www.[facility].gov/foodshelves    Market [place] Program: Spend $10 of EBT, get $10 free at MTailor market.  To find map of farmers markets:  https://www.[employer].org/programs/market-bucks/farmersmarkets/    SpendSmart,Eat Smart ([place])  Recipe ideas that are suppose to be more affordable  Calculator that allows you to compare product prices   https://spendsmart.extension.[facility]     Fare For All (38 locations in [place]):  Provides discounted groceries. Up to 40% off retail pricing. You can preorder and  or buy in person, depending on the site.   https://fareforall.thefoodJohn C. [facility]ennis [facility] Hospital.org/    [facility] Mobile  Market:  Discounted fresh produce, dairy and lean protein. Bus travels to underserved neighborhoods. Anyone can purchase from the mobile market.   https://www.thefoodgroupmn.org/groceries/[facility]-mobile-market/    [employer] Market (Online)  Get organic produce and sustainably sourced groceries delivered at up to 40% off grocery store prices.  https://www.[employer]      [facility] ([place])  Fresh Produce Distribution Events and Free Food Markets in [place].   https://[facility].org/programs/food-support/    [facility] Health and Wellness Food Programs ([facility]):  https://[facility] HospitalUniversityNow.org/helping-our-neighbors/support-everyday-life/community-food-shelf  [address]    Food Shelf: Monday - Thursday, 11:00 a.m. - 4:00 p.m.  o An ID is helpful but not required.  o You may visit once per calendar month -anytime during a month.  o Items include meat, dairy, bread, and other food, hygiene, cleaning supplies and more.  [employer] [place]: You may visit once per day, Monday, Tuesday, Wednesday, Thursday 9:00 a.m. to 11:00 a.m.  o Free fruit, vegetables, salads, and deli items  Nutrition Assistance Program for Seniors (NAPS or  the senior box ).  Eligibility: at least 60 years old & 130% Federal Poverty Guidelines.  To apply, call [employer] [place] (888)256-2177.  Free Fresh Food Fridays - Summer Outdoor Distribution:  Fruits & vegetables at [place], 2nd & 4th Fridays 9:30 a.m.  May - September, rain or shine    [place] Food Shelves ([place]):  https://Smartesting.org/food-shelves/  [place] Food Shelf  [address] (near [address]), [place] [address]  [phone]    [address] Food Shelf  [address] ([address] [facility]), [place], [place] [id number] [phone]    Foodmobile - Mobile Food Shelf  Foodmobiles travel throughout [place] and the northern [facility] to bring nutritious food to areas of high  "need. See list of locations here: https://[employer].org/events/    [facility] Food Distribution ([facility]):  https://theArizona Spine and Joint HospitalArtesian SolutionsBayhealth Emergency Center, Smyrnaation.org/programs/nutritional-services/  formerly [facility], Tuesdays 3-5PM  [address], [place] [address]     [facility], Fridays 12-2PM  [address] [place] [address]     [facility] Food Distribution, Mondays 1-2PM  [address] [place] [address]    University Students:  https://aide.[facility] [place]/food-pantry  -[employer] is open every other week during the semester. It's open on Tuesdays and Wednesdays from 12 - 5 pm and is located on the 1st floor of [facility] (Room [address]), across from the movie theater.   - Any student can visit the food pantry up to two times per month, and only once per week when the pantry is open.     Too Good To Go:   An pavan that lets users buy discounted \"Surprise Bags\" of unsold food from local stores, bakeries, and restaurants. The bags typically contain a variety of food/meals and help to reduce food waste while saving money.    COMPREHENSIVE WEIGHT MANAGEMENT PROGRAM  VIRTUAL SUPPORT GROUPS    At [facility], our Comprehensive Weight Management program offers on-line support groups for patients who are working on weight loss and considering, preparing for, or have had weight loss surgery.     There is no cost for this opportunity.  You are invited to attend the?Virtual Support Groups?provided by any of the following locations:    [facility] via VisTracks Teams with Roxanna Alonso RD, RN  2.   [place] via VisTracks Teams with Bird Franz, PhD, LP  3.   [place] via [employer] with Margie Stock RN  4.   [facility] via a Zoom Meeting with ARACELI San    The following Support Group information can also be found on our website:  https://www.[facility].org/treatments/weight-loss-and-weight-loss-surgery-support-groups      " "[facility]   WEIGHT LOSS SURGERY SUPPORT GROUP  The support group is a patient-lead forum that meets monthly to share experiences, encouragement and education. It is open to those who have had weight loss surgery, are scheduled for surgery, or are considering surgery.   WHEN: 3rd Wednesday of each month from 5:00PM - 6:00PM using Microsoft Teams.   FACILITATOR: Led by Roxanna Greenberg, RD, LD, RN, the program's Clinical Coordinator.   TO REGISTER: Please contact the clinic via imbookin ([place]) or call the nurse line directly at [phone] to inform our staff that you would like an invite sent to you and to let us know the email you would like the invite sent to. Prior to the meeting, a link with directions on how to join the meeting will be sent to you.    2024 Meetings September 18: Martha Hoffmann, PhD, Outpatient Clinical Therapist, \"Pro Tips for Habit Change\".  October 16:  Let's Talk  This will be a time of group support.??   November 20:  Let's Talk  about How to Navigate the Upcoming Holiday Season.  December 18:  Let's Talk  and Celebrate the past year.       [employer] and [facility] BARIATRIC CARE SUPPORT GROUP  This is open to all pre- and post- operative bariatric surgery patients as well as their support system.   WHEN: 3rd Tuesday of each month from 6:30 PM - 8:00 PM using Microsoft Teams.   FACILITATOR: Led by Bird Franz, Ph.D who is a Licensed Psychologist with the [facility] Comprehensive Weight Management Program.   TO REGISTER: Please send an email to Bird Franz, Ph.D., LP at?[email or web address]?if you would like an invitation to the group. Prior to the meeting, a link with directions on how to join the meeting will be sent to you.    2024 Meetings September 17: IN PERSON MEETING. [facility], [address] [place] [address], [id number], 1st floor Conference Room.  October 15: Date changed to OCTOBER 8th (2nd Tuesday). " "  November 19: \"Holiday Eating\", Roya Castellanos, SIDNEY, LD.  December 17: \"Hospital Stay and Compliance\", Margie Stock RN, CBN.      [facility] and [facility] POST-OPERATIVE BARIATRIC SURGERY SUPPORT GROUP  This is a support group for [facility] bariatric patients (and those external to [facility]) who have had bariatric surgery and are at least 3 months post-surgery.  WHEN: 4th Thursday of the month from 11:00 AM - 12:00 PM using Microsoft Teams.   FACILITATOR: Led by Certified Bariatric Nurse, Margie Stock RN.   TO REGISTER: Please send an email to Margie at [email or web address] if you would like an invitation to the group.  Prior to the meeting, a link with directions on how to join the meeting will be sent to you.    2024 Meetings September 26 October 24 November 28: No meeting  December 26    [facility]   HEALTHY LIFESTYLE COACHING GROUP  This is a 60 minute virtual coaching group for those who want to lead a healthier lifestyle. Come together to set goals and overcome barriers in a supportive group environment. We will address the four pillars of health: nutrition, exercise, sleep, and emotional well-being. This group is highly recommended for those who are participating in the 24 week Healthy Lifestyle Plan and our Health Coaching sessions.   WHEN: 1st Friday of the month, 12:30 PM - 1:30 PM   using a Zoom meeting.     FACILITATOR: Led by National Board-Certified Health and , Margie Yan [facility].  TO REGISTER: Please call the [place] at [phone] to register. You will get an appointment to attend in [employer]. Fifteen minutes prior to the meeting, complete the e-check in and you will get the link to join the meeting.  There is no charge to attend this group and space is limited.  Please register for each month you wish to attend    2024 Meetings  September 5 No " meeting.  October 4 November 1 December 6

## 2024-11-15 NOTE — LETTER
"11/15/2024       RE: Nevin Alvarado  [address]  [facility] [place] [address]     Dear Colleague,    Thank you for referring your patient, Nevin Alvarado, to the [facility] [place] at [facility]. Please see a copy of my visit note below.    Video-Visit Details    Type of service:  Video Visit    Video Start Time: 9:30 AM  Video End Time: 9:50 AM     Originating Location (pt. Location): Home    Distant Location (provider location): Offsite (providers home) [facility] [place]     Platform used for Video Visit: [employer]    Return Nutrition Consultation Note    Reason For Visit: Nutrition Assessment    Nevin Alvarado is a 34 year old presenting today for return nutrition medical weight management consult.  Patient is accompanied by self.     Pt referred by Sharon Toro NP on September 12, 2023.  CO-MORBIDITIES OF OBESITY INCLUDE:        9/12/2023    12:48 PM   --   I have the following health issues associated with obesity Type II Diabetes    High Blood Pressure    Sleep Apnea    Polycystic Ovarian Syndrome    GERD (Reflux)    Fatty Liver    Asthma    Stress Incontinence     SUPPORT:      9/12/2023    12:48 PM   Support System Reviewed With Patient   Who do you have in your support network that can be available to help you for the first 2 weeks after surgery? I live in a group home with 24 hour staff, mom   Who can you count on for support throughout your weight loss surgery journey? a friend, and my therapist     ANTHROPOMETRICS:  Initial Consult Weight: 309 lb on 9/13/23  Estimated body mass index is 43.53 kg/m  as calculated from the following:    Height as of this encounter: 1.575 m (5' 2\").    Weight as of this encounter: 108 kg (238 lb).  Current Weight: 238 lb per pt report     Wt Readings from Last 5 Encounters:   11/15/24 108 kg (238 lb)   11/13/24 108 kg (238 lb) "   11/11/24 108 kg (238 lb)   10/15/24 108 kg (238 lb)   10/12/24 106.1 kg (234 lb)         9/12/2023    12:48 PM   --   I have tried the following methods to lose weight Watching portions or calories    Exercise    Pre packaged meals ex: Nutrisystem    OTC Medications    Prescription Medications         9/12/2023    12:48 PM   Weight Loss Questions Reviewed With Patient   How long have you been overweight? Since late teens through early 20's     MEDICATION FOR WEIGHT LOSS:  Ozempic 1 mg - This dose has been going well    Hx of self harm- swallowing thing- started after she was treated for anorexia when taking topiramate- in 6 months has only had one day of self harm- this was 2 weeks ago and instead of swallowing she inserted something per rectum. - Followed by MASON Potter PA-C, PE in 2019 after esophageal perforation repair     VITAMIN/MINERAL SUPPLEMENTS:  Iron, Vitamin B12, Vitamin D     NUTRITION HISTORY:  Food allergies:NKFA  Food intolerances: None  Previous experience with diet modification for weight loss: Nutrisystem, prescription medications, exercise, watching calories or portions     Has been meal planning for the past 3 months. Likes chicken, turkey, shrimp.      October 2023: Eating 3 meals per day. Lunch and dinner meals have been chili or mediterranean orzo salad with artichokes more recently. Drinks mostly water, Cup of coffee, [employer]. Doesn't drink soda or juice. Uses a walker for neuropathy. Walking around more often/standing longer which has been an improvement.     November 2023: Reports she has been sick a lot recently. Sometimes feels she has to force herself to eat. Currently has a cold. Working with a GI doctor right now also as she has been having some GI symptoms after eating certain foods. Reports she had another self harm episode unfortunately, feels this will delay her chances for surgery.     January 2024: Had been dealing with illness recently which set her back a  little bit on her goals. Has been using Wegovy. Feels since starting the Wegovy her face gets puffy or red blotches on it, plans to keep a close eye on it before deciding to stop. Had been eating very well but finding more temptations now. Feels she is not cooking as often and doing more frozen meals.  Hasn't been using walker for a few weeks.     April 2024: Since visiting Sharon nutrition has been off and on since she was in the hospital and multiple trips to the ED. Currently being treated for pneumonia, PE, depression and anxiety. Reports she continues to have constipation/diarrhea on and off. Has Miralax as needed. Also has enema prescription also. Nutrition has been very challenging over the last month due to being in the hospital for 15 days. She did continue to do her Wegovy injections while in the hospital. Plan is to focus on getting healthy and feeling better then will work on her good eating habits again.      June 2024: Things have been going a lot better for patient since last visit. She switched from Wegovy to Ozempic over the last month. Eating 2-3 meals a day. Bowel habits have improved since last RD visit. Physical activity has gotten a lot better with the weight loss. Does not use walker anymore. Walking around a lot. Also spoke with a diabetes educator in May.     August 2024: Psychologist recommended patient not have surgery. Patient is ok with this and is happy with her weight loss on AOMs.     Last few months have been harder due to health conditions she has been dealing with and some stress around her guardianship and housing. She is trying her best to eat healthfully but hasn't found much interest in cooking. If her housing situation changes she is going to look into seeing if she can get Mom's Meals. Trying to increase her fluid intake. Will have constipation at times but feels this has gotten a little better since her last trip to the ED.     Physical activity - will walk instead of using  the scooter at stores.     Per Sharon Toro NP's recent note: Tolerating ozempic 1mg well. Disinterest in food, nausea/ vomiting and constipation have resolved. Appetite okay. Intake has been limited by new dental issues that have been going on for the last several weeks. She should be done now with dental procedures so discussed getting back on track with adequate protein and hydration. Stressed importance of not skipping meals, adequate protein and hydration.     November 2024: Moving in 3 weeks. Patient is her own guardian now.  Cooking your own meals. On the Ozempic 1 mg dose. Has been tolerating that dose. Has been dealing with constipation. Eating 3 small meals a day. Prioritizing protein the best she can. Doing better with her fluid intake now that she can drink from straws again. Averaging about 30 oz of water daily.     Physical activity - getting easier. Able to walk more, can get through the stores a lot easier when shopping.     Has a gym in her new apartment building.         9/12/2023    12:48 PM   Recall Diet Questions Reviewed With Patient   Describe what you typically consume for breakfast (typical or most recent) eggs and hash browns, homemade waffles, laith pudding with fruit   Describe what you typically consume for lunch (typical or most recent) homemade lunchables, some pasta or chicken   Describe what you typically consume for supper (typical or most recent) low calorie meal prep meals   Describe what you typically consume as snacks (typical or most recent) cheese sticks, fruit jerky, fruits/vegetables   How many ounces of water, or other low calorie drinks, do you drink daily (8 oz=1 glass)? 48 oz   How many ounces of caffeine (coffee, tea, pop) do you drink daily (8 oz=1 glass)? 16 oz   How many ounces of carbonated (pop, beer, sparkling water) drinks do you drinky daily (8 oz=1 glass)? 0 oz   How many ounces of juice, pop, sweet tea, sports drinks, protein drinks, other sweetened drinks, do  you drink daily (8 oz=1 glass)? 0 oz   How many ounces of milk do you drink daily (8 oz=1 glass) 0 oz   How often do you drink alcohol? Never           9/12/2023    12:48 PM   Eating Habits   What foods do you crave? ice cream, chocolate, sometimes just salty chips           9/12/2023    12:48 PM   Dining Out History Reviewed With Patient   How often do you dine out? Rarely.   Where do you dine out? (select all that apply) take out   What types of food do you order when you dine out? jitendra verdin     EXERCISE:        7/20/2024     9:33 AM   --   How often do you exercise? 1 to 2 times per week   What is the duration of your exercise (in minutes)? 15 Minutes   What types of exercise do you do? walking   What keeps you from being more active? Pain     NUTRITION DIAGNOSIS:  Obesity r/t long history of positive energy balance aeb BMI >30 kg/m2.    INTERVENTION:  Intervention Provided/Education Provided on post-op diet guidelines, vitamins/minerals essential post-operatively, GI anatomy of bariatric surgeries, ways to help prepare for post-op diet guidelines pre-operatively, portion/calorie-control, mindful eating and sources of protein.  Patient demonstrates understanding.     Personal barriers to making and continuing required life changes have been identified, and strategies to overcome those barriers have been recommended AND family and social supports have been assessed and strategies to strengthen those supports have been recommended.    Provided pt with list of goals, RD contact information and resources listed below via -[employer] [place] [employer].           9/12/2023    12:48 PM   Questions Reviewed With Patient   How ready are you to make changes regarding your weight? Number 1 = Not ready at all to make changes up to 10 = very ready. 10   How confident are you that you can change? 1 = Not confident that you will be successful making changes up to 10 = very confident. 7     Expected Engagement: good    GOALS:  1) Continue to eat 3  small meals daily  2) Have a good protein source at all meals  3) Keep up with hydration, aim for 64 oz of fluid daily.     Eating Well on a Budget: https://www.fvfiles.com/803805.pdf    Cost friendly protein sources   Peanut Butter  Eggs  Edamame  Canned Tuna  Canned [place]  Greek Yogurt  Sunflower Seeds  Black Beans  Cottage Cheese  Lentils  Oats  Milk  Pumpkin Seeds  Ground Turkey  Tempeh    Hunger Solutions:   Call the [place] Food Help Line at 1-578.643.1613 to talk with a specialist in community and government food assistance programs. They can help you sign-up for SNAP benefits, find food malone, food shelves and free food in your community and help refer you to any other community assistance programs that you qualify for.   https://www.CloudFloor.org/find-help/    Supplemental Nutrition Assistance Program (SNAP):  [place]://[place].gov/dhs/people-we-serve/adults/economic-assistance/food-nutrition/programs-and-services/supplemental-nutrition-assistance-program.jsp    [facility]:  To find a map of food shelves and food distribution pop-ups. Visit www.[facility].gov/foodshelves    Market [place] Program: Spend $10 of EBT, get $10 free at [place] market.  To find map of farmers markets:  https://www.CloudFloor.org/programs/market-bucks/farmersmarkets/    SpendSmart,Eat Smart ([place])  Recipe ideas that are suppose to be more affordable  Calculator that allows you to compare product prices   https://spendsmart.extension.[facility]     Fare For All (38 locations in [place]):  Provides discounted groceries. Up to 40% off retail pricing. You can preorder and  or buy in person, depending on the site.   https://fareforall.[employer].org/    [place] [employer] Market:  Discounted fresh produce, dairy and lean protein. Bus travels to underserved neighborhoods. Anyone can purchase from the mobile market.   https://www.[employer]pper [employer].org/groceries/[facility]-market/    [employer] Market  (Online)  Get organic produce and sustainably sourced groceries delivered at up to 40% off grocery store prices.  https://www.[place].[facility]      [facility] ([place])  Fresh Produce Distribution Events and Free Food Markets in [place].   https://[facility].org/programs/food-support/    [facility] Health and Wellness Food Programs ([facility]):  https://[facility].org/helping-our-neighbors/support-everyday-life/community-food-shelf  [address]    Food Shelf: Monday - Thursday, 11:00 a.m. - 4:00 p.m.  o An ID is helpful but not required.  o You may visit once per calendar month -anytime during a month.  o Items include meat, dairy, bread, and other food, hygiene, cleaning supplies and more.  [employer] [place]: You may visit once per day, Monday, Tuesday, Wednesday, Thursday 9:00 a.m. to 11:00 a.m.  o Free fruit, vegetables, salads, and deli items  Nutrition Assistance Program for Seniors (NAPS or  the senior box ).  Eligibility: at least 60 years old & 130% Federal Poverty Guidelines.  To apply, call [place] [phone].  Free Fresh Food Fridays - Summer Outdoor Distribution:  Fruits & vegetables at [place], 2nd & 4th Fridays 9:30 a.m.  May - September, rain or shine    [place] Food Shelves ([place]):  https://[employer].org/food-shelves/  [place] Food Shelf  [address] (near [facility])[place] [address]  [phone]    [facility] Food Shelf  [address] (at [facility])[place], [place] [address] [phone]    Foodmobile - Mobile Food Shelf  Foodmobiles travel throughout [place] and the northern subCommunity [facility] to bring nutritious food to areas of high need. See list of locations here: https://[employer].org/events/    [facility] - Community Food Distribution ([facility]):  https://thesannehfoundation.org/programs/nutritional-services/  [facility]  "[place], Tuesdays 3-5PM  [address], [place] [address]     [facility], Fridays 12-2PM  [address] [place] [address]     [facility] Food Distribution, Mondays 1-2PM  [address] [place] [address]    [place] Students:  https://aide.[facility]/food-pantry  -[employer]ry is open every other week during the semester. It's open on Tuesdays and Wednesdays from 12 - 5 pm and is located on the 1st floor of [facility] ([address]), across from the movie theater.   - Any student can visit the food pantry up to two times per month, and only once per week when the pantry is open.     Too Good To Go:   An pavan that lets users buy discounted \"Surprise Bags\" of unsold food from local stores, bakeries, and restaurants. The bags typically contain a variety of food/meals and help to reduce food waste while saving money.    Follow Up: February 20     Time spent with patient: 20 minutes.  Nevin Birmingham RD, LD        Again, thank you for allowing me to participate in the care of your patient.      Sincerely,    Nevin Birmingham RD    "

## 2024-11-15 NOTE — NURSING NOTE
Current patient location: home     Is the patient currently in the state of [place]? YES    Visit mode:VIDEO    If the visit is dropped, the patient can be reconnected by: VIDEO VISIT: Text to cell phone:   Telephone Information:   Mobile [phone]       Will anyone else be joining the visit? NO  (If patient encounters technical issues they should call [phone]    Are changes needed to the allergy or medication list? Pt stated no changes to allergies and Pt stated no med changes    Are refills needed on medications prescribed by this physician? NO    Rooming Documentation:  Questionnaire(s) completed      Reason for visit: RECHECK    Wt other than 24 hrs:    Pain more than one location:  no  Kailyn ANTONIO

## 2024-11-16 ENCOUNTER — HOSPITAL ENCOUNTER (EMERGENCY)
Facility: CLINIC | Age: 34
Discharge: HOME | End: 2024-11-17
Attending: EMERGENCY MEDICINE | Admitting: EMERGENCY MEDICINE
Payer: COMMERCIAL

## 2024-11-16 DIAGNOSIS — M54.42 ACUTE LEFT-SIDED LOW BACK PAIN WITH LEFT-SIDED SCIATICA: ICD-10-CM

## 2024-11-16 PROCEDURE — 99283 EMERGENCY DEPT VISIT LOW MDM: CPT

## 2024-11-16 ASSESSMENT — ACTIVITIES OF DAILY LIVING (ADL): ADLS_ACUITY_SCORE: 21.25

## 2024-11-17 VITALS
SYSTOLIC BLOOD PRESSURE: 140 MMHG | DIASTOLIC BLOOD PRESSURE: 85 MMHG | BODY MASS INDEX: 42.51 KG/M2 | HEART RATE: 102 BPM | OXYGEN SATURATION: 96 % | WEIGHT: 231 LBS | RESPIRATION RATE: 20 BRPM | TEMPERATURE: 98.2 F | HEIGHT: 62 IN

## 2024-11-17 PROCEDURE — 250N000013 HC RX MED GY IP 250 OP 250 PS 637: Performed by: EMERGENCY MEDICINE

## 2024-11-17 RX ORDER — ACETAMINOPHEN 500 MG
1000 TABLET ORAL ONCE
Status: COMPLETED | OUTPATIENT
Start: 2024-11-17 | End: 2024-11-17

## 2024-11-17 RX ADMIN — ACETAMINOPHEN 1000 MG: 500 TABLET, FILM COATED ORAL at 03:11

## 2024-11-17 ASSESSMENT — ACTIVITIES OF DAILY LIVING (ADL)
ADLS_ACUITY_SCORE: 21.25

## 2024-11-17 NOTE — ED NOTES
Transport [facility] transport called  and will be here in 20-30 minutes. Stretcher crew will be coming

## 2024-11-17 NOTE — ED TRIAGE NOTES
BIBA for left sided pain.  States pain is worse left hip to toe.  Also complaint of back pain with breathing.  And neck pain into back.  Took 200mg lyrica and 4 mg zofran PTA.        Triage Assessment (Adult)       Row Name 11/16/24 5508          Triage Assessment    Airway WDL WDL        Respiratory WDL    Respiratory WDL WDL        Skin Circulation/Temperature WDL    Skin Circulation/Temperature WDL WDL        Cardiac WDL    Cardiac WDL WDL        Peripheral/Neurovascular WDL    Peripheral Neurovascular WDL WDL        Cognitive/Neuro/Behavioral WDL    Cognitive/Neuro/Behavioral WDL WDL        [place] Coma Scale    Best Eye Response 4-->(E4) spontaneous     Best Motor Response 6-->(M6) obeys commands     Best Verbal Response 5-->(V5) oriented     [place] Coma Scale Score 15

## 2024-11-17 NOTE — ED NOTES
Pt refusing to get up and sit on chair. Security and charge nurse both at desk. Pt assisted with her shoes and she said she will not get up unless we tell her how long the w/c ride to her group home

## 2024-11-17 NOTE — ED PROVIDER NOTES
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 on her bed.  She then started to say she cannot feel her legs which is also not true as I was in the room 10 minutes before she said this and there was normal sensation.  I discussed that if she really cannot walk then she can be hospitalized which she declined and said she does not want to be  hospitalized. Repeatedly declined hospitalization. We even arrange transport for her to go back home but she then refused to leave.  She ultimately left with transport that we had arranged for her.    Disposition   The patient was discharged.     Diagnosis     ICD-10-CM    1. Acute left-sided low back pain with left-sided sciatica  M54.42          Scribe Disclosure:  TERRI Luz Hemantbryan, am serving as a scribe at 1:36 AM on 11/17/2024 to document services personally performed by Lori Neri MD based on my observations and the provider's statements to me.        Lori Neri MD  11/17/24 0600

## 2024-11-17 NOTE — ED NOTES
Group home staff called and notified of patient's discharge. Writer called [phone] and spoke with Wander

## 2024-11-17 NOTE — ED NOTES
EMS stretcher team is here to take the patient back to the group home, patient refusing to get up and go. Pt is arguing with staff about either going or staying.

## 2024-11-17 NOTE — ED NOTES
"Patient demanding to talk to [facility], refusing to leave without MRI. She was given patient relations card. Patient continue to yell at staff, stating \"I'm going to fawn all of you.\" MD offered patient admission but patient stated \"I don't want that either.\" Patient notified that she is discharged from the Emergency Department as she is refusing admission. Transport called back to her group home. Patient refusing to get out of bed until her ride arrives to pick her up. Patient informed that if she refuses to leave department, PD will be contacted.   "

## 2024-11-17 NOTE — DISCHARGE INSTRUCTIONS
Follow up with [facility]  Tylenol for pain as well as lyrica and muscle relaxer    Discharge Instructions  Back Pain  You were seen today for back pain. Back pain can have many causes, but most will get better without surgery or other specific treatment. Sometimes there is a herniated ( slipped ) disc. We do not usually do MRI scans to look for these right away, since most herniated discs will get better on their own with time.  Today, we did not find any evidence that your back pain was caused by a serious condition. However, sometimes symptoms develop over time and cannot be found during an emergency visit, so it is very important that you follow up with your primary provider.  Generally, every Emergency Department visit should have a follow-up clinic visit with either a primary or a specialty clinic/provider. Please follow-up as instructed by your emergency provider today.    Return to the Emergency Department if:  You develop a fever with your back pain.   You have weakness or change in sensation in one or both legs.  You lose control of your bowels or bladder, or cannot empty your bladder (cannot pee).  Your pain gets much worse.     Follow-up with your provider:  Unless your pain has completely gone away, please make an appointment with your provider within one week. Most of the routine care for back pain is available in a clinic and not the Emergency Department. You may need further management of your back pain, such as more pain medication, imaging such as an X-ray or MRI, or physical therapy.    What can I do to help myself?  Remain Active -- People are often afraid that they will hurt their back further or delay recovery by remaining active, but this is one of the best things you can do for your back. In fact, staying in bed for a long time to rest is not recommended. Studies have shown that people with low back pain recover faster when they remain active. Movement helps to bring blood flow to the muscles and  relieve muscle spasms as well as preventing loss of muscle strength.  Heat -- Using a heating pad can help with low back pain during the first few weeks. Do not sleep with a heating pad, as you can be burned.   Pain medications - You may take a pain medication such as Tylenol  (acetaminophen), Advil , Motrin  (ibuprofen) or Aleve  (naproxen).  If you were given a prescription for medicine here today, be sure to read all of the information (including the package insert) that comes with your prescription.  This will include important information about the medicine, its side effects, and any warnings that you need to know about.  The pharmacist who fills the prescription can provide more information and answer questions you may have about the medicine.  If you have questions or concerns that the pharmacist cannot address, please call or return to the Emergency Department.   Remember that you can always come back to the Emergency Department if you are not able to see your regular provider in the amount of time listed above, if you get any new symptoms, or if there is anything that worries you.

## 2024-11-20 ENCOUNTER — DOCUMENTATION ONLY (OUTPATIENT)
Dept: OTHER | Facility: CLINIC | Age: 34
End: 2024-11-20
Payer: COMMERCIAL

## 2024-12-16 ENCOUNTER — APPOINTMENT (OUTPATIENT)
Dept: GENERAL RADIOLOGY | Facility: CLINIC | Age: 34
End: 2024-12-16
Attending: EMERGENCY MEDICINE
Payer: COMMERCIAL

## 2024-12-16 ENCOUNTER — HOSPITAL ENCOUNTER (EMERGENCY)
Facility: CLINIC | Age: 34
Discharge: HOME | End: 2024-12-16
Attending: EMERGENCY MEDICINE | Admitting: EMERGENCY MEDICINE
Payer: COMMERCIAL

## 2024-12-16 VITALS
WEIGHT: 233.03 LBS | TEMPERATURE: 97.5 F | HEART RATE: 73 BPM | BODY MASS INDEX: 42.88 KG/M2 | RESPIRATION RATE: 20 BRPM | SYSTOLIC BLOOD PRESSURE: 113 MMHG | DIASTOLIC BLOOD PRESSURE: 75 MMHG | HEIGHT: 62 IN | OXYGEN SATURATION: 98 %

## 2024-12-16 DIAGNOSIS — M54.9 ACUTE LEFT-SIDED BACK PAIN, UNSPECIFIED BACK LOCATION: ICD-10-CM

## 2024-12-16 DIAGNOSIS — R05.1 ACUTE COUGH: ICD-10-CM

## 2024-12-16 DIAGNOSIS — R07.9 CHEST PAIN, UNSPECIFIED TYPE: ICD-10-CM

## 2024-12-16 LAB
FLUAV RNA SPEC QL NAA+PROBE: NEGATIVE
FLUBV RNA RESP QL NAA+PROBE: NEGATIVE
RSV RNA SPEC NAA+PROBE: NEGATIVE
SARS-COV-2 RNA RESP QL NAA+PROBE: NEGATIVE

## 2024-12-16 PROCEDURE — 93005 ELECTROCARDIOGRAM TRACING: CPT

## 2024-12-16 PROCEDURE — 250N000013 HC RX MED GY IP 250 OP 250 PS 637: Performed by: EMERGENCY MEDICINE

## 2024-12-16 PROCEDURE — 99285 EMERGENCY DEPT VISIT HI MDM: CPT | Mod: 25

## 2024-12-16 PROCEDURE — 87637 SARSCOV2&INF A&B&RSV AMP PRB: CPT | Performed by: EMERGENCY MEDICINE

## 2024-12-16 PROCEDURE — 71046 X-RAY EXAM CHEST 2 VIEWS: CPT

## 2024-12-16 RX ORDER — ACETAMINOPHEN 500 MG
1000 TABLET ORAL ONCE
Status: COMPLETED | OUTPATIENT
Start: 2024-12-16 | End: 2024-12-16

## 2024-12-16 RX ADMIN — ACETAMINOPHEN 1000 MG: 500 TABLET, FILM COATED ORAL at 22:03

## 2024-12-16 ASSESSMENT — ACTIVITIES OF DAILY LIVING (ADL)
ADLS_ACUITY_SCORE: 67
ADLS_ACUITY_SCORE: 67

## 2024-12-17 LAB
ATRIAL RATE - MUSE: 86 BPM
DIASTOLIC BLOOD PRESSURE - MUSE: NORMAL MMHG
INTERPRETATION ECG - MUSE: NORMAL
P AXIS - MUSE: 40 DEGREES
PR INTERVAL - MUSE: 138 MS
QRS DURATION - MUSE: 74 MS
QT - MUSE: 342 MS
QTC - MUSE: 409 MS
R AXIS - MUSE: 70 DEGREES
SYSTOLIC BLOOD PRESSURE - MUSE: NORMAL MMHG
T AXIS - MUSE: 6 DEGREES
VENTRICULAR RATE- MUSE: 86 BPM

## 2024-12-17 NOTE — ED TRIAGE NOTES
Pt states that today she finished her abx and steriods a couple days ago.  Pt states that she is still having trouble breathing and feels that she has a bunch of mucus to cough up but cannot.  She also states more fatigued.       Triage Assessment (Adult)       Row Name 12/16/24 2001          Triage Assessment    Airway WDL WDL        Respiratory WDL    Respiratory WDL X;cough     Cough Frequency infrequent        Skin Circulation/Temperature WDL    Skin Circulation/Temperature WDL WDL        Cardiac WDL    Cardiac WDL WDL        Peripheral/Neurovascular WDL    Peripheral Neurovascular WDL WDL        Cognitive/Neuro/Behavioral WDL    Cognitive/Neuro/Behavioral WDL WDL

## 2024-12-17 NOTE — DISCHARGE INSTRUCTIONS
Your blood pressure today was slightly elevated, but otherwise her vitals are normal.  Your EKG does not show any changes consistent with heart attack.  Chest x-ray did not show any signs of pneumonia, enlarged heart, or fluid on the outside of the lung.  I recommend considering over-the-counter Mucinex to help thin your secretions.  Continue the medications already prescribed, including albuterol inhalers as needed.  I recommend following up with your primary care doctor for continued symptoms.  With bronchitis, symptoms can be ongoing for several weeks.  We did not find a dangerous condition today.  However, if you develop increased pain, fever, increased shortness of breath, or other concerns, he can return to the ED.

## 2024-12-17 NOTE — ED PROVIDER NOTES
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 symptoms, including fever, change in cough, or for other concerns.    Disposition   The patient was discharged.     Diagnosis     ICD-10-CM    1. Acute cough  R05.1       2. Chest pain, unspecified type   R07.9       3. Acute left-sided back pain, unspecified back location  M54.9            Discharge Medications   Discharge Medication List as of 12/16/2024 10:48 PM            Scribe Disclosure:  I, Siddharth Membreno, am serving as a scribe at 9:23 PM on 12/16/2024 to document services personally performed by Kathy John MD based on my observations and the provider's statements to me.        Kathy John MD  12/18/24 [id number]

## 2024-12-30 ENCOUNTER — HOSPITAL ENCOUNTER (EMERGENCY)
Facility: CLINIC | Age: 34
Discharge: HOME | End: 2024-12-30
Attending: EMERGENCY MEDICINE | Admitting: EMERGENCY MEDICINE
Payer: COMMERCIAL

## 2024-12-30 VITALS
DIASTOLIC BLOOD PRESSURE: 87 MMHG | WEIGHT: 220 LBS | RESPIRATION RATE: 17 BRPM | BODY MASS INDEX: 40.24 KG/M2 | OXYGEN SATURATION: 99 % | TEMPERATURE: 98.4 F | SYSTOLIC BLOOD PRESSURE: 138 MMHG | HEART RATE: 87 BPM

## 2024-12-30 DIAGNOSIS — R51.9 NONINTRACTABLE HEADACHE, UNSPECIFIED CHRONICITY PATTERN, UNSPECIFIED HEADACHE TYPE: ICD-10-CM

## 2024-12-30 DIAGNOSIS — L98.9 ECZEMATOUS SKIN LESIONS: ICD-10-CM

## 2024-12-30 LAB
ALBUMIN SERPL BCG-MCNC: 4.1 G/DL (ref 3.5–5.2)
ALP SERPL-CCNC: 74 U/L (ref 40–150)
ALT SERPL W P-5'-P-CCNC: 25 U/L (ref 0–50)
ANION GAP SERPL CALCULATED.3IONS-SCNC: 15 MMOL/L (ref 7–15)
AST SERPL W P-5'-P-CCNC: 18 U/L (ref 0–45)
BASOPHILS # BLD AUTO: 0 10E3/UL (ref 0–0.2)
BASOPHILS NFR BLD AUTO: 0 %
BILIRUB SERPL-MCNC: 0.3 MG/DL
BUN SERPL-MCNC: 9.7 MG/DL (ref 6–20)
CALCIUM SERPL-MCNC: 9.3 MG/DL (ref 8.8–10.4)
CHLORIDE SERPL-SCNC: 106 MMOL/L (ref 98–107)
CREAT SERPL-MCNC: 0.65 MG/DL (ref 0.51–0.95)
EGFRCR SERPLBLD CKD-EPI 2021: >90 ML/MIN/1.73M2
EOSINOPHIL # BLD AUTO: 0.2 10E3/UL (ref 0–0.7)
EOSINOPHIL NFR BLD AUTO: 1 %
ERYTHROCYTE [DISTWIDTH] IN BLOOD BY AUTOMATED COUNT: 13.4 % (ref 10–15)
GLUCOSE SERPL-MCNC: 113 MG/DL (ref 70–99)
HCO3 SERPL-SCNC: 20 MMOL/L (ref 22–29)
HCT VFR BLD AUTO: 42.9 % (ref 35–47)
HGB BLD-MCNC: 14.4 G/DL (ref 11.7–15.7)
IMM GRANULOCYTES # BLD: 0 10E3/UL
IMM GRANULOCYTES NFR BLD: 0 %
LYMPHOCYTES # BLD AUTO: 2.2 10E3/UL (ref 0.8–5.3)
LYMPHOCYTES NFR BLD AUTO: 20 %
MCH RBC QN AUTO: 29.4 PG (ref 26.5–33)
MCHC RBC AUTO-ENTMCNC: 33.6 G/DL (ref 31.5–36.5)
MCV RBC AUTO: 88 FL (ref 78–100)
MONOCYTES # BLD AUTO: 0.5 10E3/UL (ref 0–1.3)
MONOCYTES NFR BLD AUTO: 4 %
NEUTROPHILS # BLD AUTO: 8 10E3/UL (ref 1.6–8.3)
NEUTROPHILS NFR BLD AUTO: 73 %
NRBC # BLD AUTO: 0 10E3/UL
NRBC BLD AUTO-RTO: 0 /100
PLATELET # BLD AUTO: 282 10E3/UL (ref 150–450)
POTASSIUM SERPL-SCNC: 3.6 MMOL/L (ref 3.4–5.3)
PROT SERPL-MCNC: 6.6 G/DL (ref 6.4–8.3)
RBC # BLD AUTO: 4.89 10E6/UL (ref 3.8–5.2)
SODIUM SERPL-SCNC: 141 MMOL/L (ref 135–145)
WBC # BLD AUTO: 10.9 10E3/UL (ref 4–11)

## 2024-12-30 PROCEDURE — 99284 EMERGENCY DEPT VISIT MOD MDM: CPT

## 2024-12-30 PROCEDURE — 250N000012 HC RX MED GY IP 250 OP 636 PS 637: Performed by: EMERGENCY MEDICINE

## 2024-12-30 PROCEDURE — 85048 AUTOMATED LEUKOCYTE COUNT: CPT | Performed by: EMERGENCY MEDICINE

## 2024-12-30 PROCEDURE — 82310 ASSAY OF CALCIUM: CPT | Performed by: EMERGENCY MEDICINE

## 2024-12-30 PROCEDURE — 82565 ASSAY OF CREATININE: CPT | Performed by: EMERGENCY MEDICINE

## 2024-12-30 PROCEDURE — 82374 ASSAY BLOOD CARBON DIOXIDE: CPT | Performed by: EMERGENCY MEDICINE

## 2024-12-30 PROCEDURE — 85004 AUTOMATED DIFF WBC COUNT: CPT | Performed by: EMERGENCY MEDICINE

## 2024-12-30 PROCEDURE — 36415 COLL VENOUS BLD VENIPUNCTURE: CPT | Performed by: EMERGENCY MEDICINE

## 2024-12-30 RX ORDER — ERYTHROMYCIN 5 MG/G
0.5 OINTMENT OPHTHALMIC 3 TIMES DAILY
Qty: 3.5 G | Refills: 0 | Status: SHIPPED | OUTPATIENT
Start: 2024-12-30 | End: 2025-01-06

## 2024-12-30 RX ORDER — PREDNISONE 20 MG/1
60 TABLET ORAL ONCE
Status: COMPLETED | OUTPATIENT
Start: 2024-12-30 | End: 2024-12-30

## 2024-12-30 RX ORDER — PREDNISONE 20 MG/1
TABLET ORAL
Qty: 10 TABLET | Refills: 0 | Status: SHIPPED | OUTPATIENT
Start: 2024-12-30

## 2024-12-30 RX ADMIN — PREDNISONE 60 MG: 20 TABLET ORAL at 03:15

## 2024-12-30 ASSESSMENT — ACTIVITIES OF DAILY LIVING (ADL)
ADLS_ACUITY_SCORE: 67
ADLS_ACUITY_SCORE: 67

## 2024-12-30 NOTE — ED PROVIDER NOTES
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   The patient was discharged.     Diagnosis     ICD-10-CM    1. Eczematous skin lesions  L98.9 Adult Dermatology  Referral      2. Nonintractable headache, unspecified chronicity pattern, unspecified headache type  R51.9            Discharge Medications   Discharge  Medication List as of 12/30/2024  4:45 AM        START taking these medications    Details   erythromycin (ROMYCIN) 5 MG/GM ophthalmic ointment Place 0.5 inches into the right eye 3 times daily for 7 days.Disp-3.5 g, [id number]      predniSONE (DELTASONE) 20 MG tablet Take two tablets (= 40mg) each day for 5 (five) days, Disp-10 tablet, R-0, E-Prescribe                Quan Fox MD  12/30/24 [id number]

## 2024-12-30 NOTE — DISCHARGE INSTRUCTIONS
Return for fevers, worsening rash, new concerns. Follow up with dermatology. Take the medications as prescribed.

## 2024-12-30 NOTE — ED TRIAGE NOTES
Pt BIBA from home d/t itchy rash across abdomen and L hip x2 weeks. Also c/o rash with burning sensation and bumps across forehead, R eyelid and scalp which have been developing over last couple weeks as well. Rates pain 8/10. Endorses HA and chills. Zyrtec, benadryl taken at home with no relief. Last tylenol and zyrtec at 1730. VSS, ABCs intact, A&Ox4.

## 2025-01-07 ENCOUNTER — APPOINTMENT (OUTPATIENT)
Dept: CT IMAGING | Facility: CLINIC | Age: 35
End: 2025-01-07
Attending: EMERGENCY MEDICINE
Payer: COMMERCIAL

## 2025-01-07 ENCOUNTER — HOSPITAL ENCOUNTER (EMERGENCY)
Facility: CLINIC | Age: 35
Discharge: HOME | End: 2025-01-07
Attending: EMERGENCY MEDICINE
Payer: COMMERCIAL

## 2025-01-07 VITALS
OXYGEN SATURATION: 97 % | BODY MASS INDEX: 42.33 KG/M2 | HEART RATE: 115 BPM | WEIGHT: 230 LBS | DIASTOLIC BLOOD PRESSURE: 92 MMHG | RESPIRATION RATE: 20 BRPM | SYSTOLIC BLOOD PRESSURE: 155 MMHG | HEIGHT: 62 IN | TEMPERATURE: 98.6 F

## 2025-01-07 DIAGNOSIS — R51.9 ACUTE NONINTRACTABLE HEADACHE, UNSPECIFIED HEADACHE TYPE: ICD-10-CM

## 2025-01-07 LAB
ANION GAP SERPL CALCULATED.3IONS-SCNC: 14 MMOL/L (ref 7–15)
BASOPHILS # BLD AUTO: 0 10E3/UL (ref 0–0.2)
BASOPHILS NFR BLD AUTO: 0 %
BUN SERPL-MCNC: 12.4 MG/DL (ref 6–20)
CALCIUM SERPL-MCNC: 9.4 MG/DL (ref 8.8–10.4)
CHLORIDE SERPL-SCNC: 106 MMOL/L (ref 98–107)
CREAT SERPL-MCNC: 0.69 MG/DL (ref 0.51–0.95)
EGFRCR SERPLBLD CKD-EPI 2021: >90 ML/MIN/1.73M2
EOSINOPHIL # BLD AUTO: 0.2 10E3/UL (ref 0–0.7)
EOSINOPHIL NFR BLD AUTO: 2 %
ERYTHROCYTE [DISTWIDTH] IN BLOOD BY AUTOMATED COUNT: 13.7 % (ref 10–15)
FLUAV RNA SPEC QL NAA+PROBE: NEGATIVE
FLUBV RNA RESP QL NAA+PROBE: NEGATIVE
GLUCOSE SERPL-MCNC: 135 MG/DL (ref 70–99)
HCO3 SERPL-SCNC: 21 MMOL/L (ref 22–29)
HCT VFR BLD AUTO: 43.7 % (ref 35–47)
HGB BLD-MCNC: 14.6 G/DL (ref 11.7–15.7)
HOLD SPECIMEN: NORMAL
HOLD SPECIMEN: NORMAL
IMM GRANULOCYTES # BLD: 0.1 10E3/UL
IMM GRANULOCYTES NFR BLD: 1 %
LYMPHOCYTES # BLD AUTO: 2.4 10E3/UL (ref 0.8–5.3)
LYMPHOCYTES NFR BLD AUTO: 20 %
MCH RBC QN AUTO: 29.4 PG (ref 26.5–33)
MCHC RBC AUTO-ENTMCNC: 33.4 G/DL (ref 31.5–36.5)
MCV RBC AUTO: 88 FL (ref 78–100)
MONOCYTES # BLD AUTO: 0.6 10E3/UL (ref 0–1.3)
MONOCYTES NFR BLD AUTO: 5 %
NEUTROPHILS # BLD AUTO: 9.1 10E3/UL (ref 1.6–8.3)
NEUTROPHILS NFR BLD AUTO: 73 %
NRBC # BLD AUTO: 0 10E3/UL
NRBC BLD AUTO-RTO: 0 /100
PLATELET # BLD AUTO: 297 10E3/UL (ref 150–450)
POTASSIUM SERPL-SCNC: 3.8 MMOL/L (ref 3.4–5.3)
RBC # BLD AUTO: 4.96 10E6/UL (ref 3.8–5.2)
RSV RNA SPEC NAA+PROBE: NEGATIVE
SARS-COV-2 RNA RESP QL NAA+PROBE: NEGATIVE
SODIUM SERPL-SCNC: 141 MMOL/L (ref 135–145)
WBC # BLD AUTO: 12.4 10E3/UL (ref 4–11)

## 2025-01-07 PROCEDURE — 70450 CT HEAD/BRAIN W/O DYE: CPT | Mod: XS

## 2025-01-07 PROCEDURE — 96361 HYDRATE IV INFUSION ADD-ON: CPT

## 2025-01-07 PROCEDURE — 85004 AUTOMATED DIFF WBC COUNT: CPT | Performed by: EMERGENCY MEDICINE

## 2025-01-07 PROCEDURE — 82310 ASSAY OF CALCIUM: CPT | Performed by: EMERGENCY MEDICINE

## 2025-01-07 PROCEDURE — 258N000003 HC RX IP 258 OP 636: Performed by: EMERGENCY MEDICINE

## 2025-01-07 PROCEDURE — 96375 TX/PRO/DX INJ NEW DRUG ADDON: CPT | Mod: 59

## 2025-01-07 PROCEDURE — 80048 BASIC METABOLIC PNL TOTAL CA: CPT | Performed by: EMERGENCY MEDICINE

## 2025-01-07 PROCEDURE — 99285 EMERGENCY DEPT VISIT HI MDM: CPT | Mod: 25

## 2025-01-07 PROCEDURE — 250N000011 HC RX IP 250 OP 636: Performed by: EMERGENCY MEDICINE

## 2025-01-07 PROCEDURE — 36415 COLL VENOUS BLD VENIPUNCTURE: CPT | Performed by: EMERGENCY MEDICINE

## 2025-01-07 PROCEDURE — 70496 CT ANGIOGRAPHY HEAD: CPT

## 2025-01-07 PROCEDURE — 87637 SARSCOV2&INF A&B&RSV AMP PRB: CPT | Performed by: EMERGENCY MEDICINE

## 2025-01-07 PROCEDURE — 96374 THER/PROPH/DIAG INJ IV PUSH: CPT | Mod: 59

## 2025-01-07 PROCEDURE — 250N000009 HC RX 250: Performed by: EMERGENCY MEDICINE

## 2025-01-07 PROCEDURE — 85014 HEMATOCRIT: CPT | Performed by: EMERGENCY MEDICINE

## 2025-01-07 RX ORDER — ACETAMINOPHEN 500 MG
1000 TABLET ORAL ONCE
Status: DISCONTINUED | OUTPATIENT
Start: 2025-01-07 | End: 2025-01-07

## 2025-01-07 RX ORDER — MAGNESIUM SULFATE HEPTAHYDRATE 40 MG/ML
2 INJECTION, SOLUTION INTRAVENOUS ONCE
Status: DISCONTINUED | OUTPATIENT
Start: 2025-01-07 | End: 2025-01-07

## 2025-01-07 RX ORDER — DIPHENHYDRAMINE HYDROCHLORIDE 50 MG/ML
25 INJECTION INTRAMUSCULAR; INTRAVENOUS ONCE
Status: COMPLETED | OUTPATIENT
Start: 2025-01-07 | End: 2025-01-07

## 2025-01-07 RX ORDER — METOCLOPRAMIDE HYDROCHLORIDE 5 MG/ML
10 INJECTION INTRAMUSCULAR; INTRAVENOUS ONCE
Status: COMPLETED | OUTPATIENT
Start: 2025-01-07 | End: 2025-01-07

## 2025-01-07 RX ORDER — IOPAMIDOL 755 MG/ML
500 INJECTION, SOLUTION INTRAVASCULAR ONCE
Status: COMPLETED | OUTPATIENT
Start: 2025-01-07 | End: 2025-01-07

## 2025-01-07 RX ADMIN — METOCLOPRAMIDE 10 MG: 5 INJECTION, SOLUTION INTRAMUSCULAR; INTRAVENOUS at 19:26

## 2025-01-07 RX ADMIN — SODIUM CHLORIDE 80 ML: 9 INJECTION, SOLUTION INTRAVENOUS at 19:43

## 2025-01-07 RX ADMIN — DIPHENHYDRAMINE HYDROCHLORIDE 25 MG: 50 INJECTION, SOLUTION INTRAMUSCULAR; INTRAVENOUS at 19:25

## 2025-01-07 RX ADMIN — IOPAMIDOL 67 ML: 755 INJECTION, SOLUTION INTRAVENOUS at 19:43

## 2025-01-07 RX ADMIN — SODIUM CHLORIDE 1000 ML: 9 INJECTION, SOLUTION INTRAVENOUS at 19:21

## 2025-01-07 ASSESSMENT — ACTIVITIES OF DAILY LIVING (ADL)
ADLS_ACUITY_SCORE: 67

## 2025-01-08 LAB
ATRIAL RATE - MUSE: 97 BPM
DIASTOLIC BLOOD PRESSURE - MUSE: NORMAL MMHG
INTERPRETATION ECG - MUSE: NORMAL
P AXIS - MUSE: 39 DEGREES
PR INTERVAL - MUSE: 144 MS
QRS DURATION - MUSE: 82 MS
QT - MUSE: 322 MS
QTC - MUSE: 408 MS
R AXIS - MUSE: 62 DEGREES
SYSTOLIC BLOOD PRESSURE - MUSE: NORMAL MMHG
T AXIS - MUSE: 19 DEGREES
VENTRICULAR RATE- MUSE: 97 BPM

## 2025-01-08 NOTE — ED NOTES
Bed: ED05  Expected date: 1/21/17  Expected time:   Means of arrival:   Comments:  Enzo 1    27 yo F     Incisional pain   potassium 6.9

## 2025-01-08 NOTE — ED PROVIDER NOTES
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 visit today and she is comfortable with this plan.  Discussed signs/symptoms to prompt patient's return the emergency department.  All questions were answered prior to discharge.    Disposition   The patient was discharged.     Diagnosis   No diagnosis found.      Discharge Medications   New Prescriptions    No medications on file           Scribe Disclosure:  I, Edinson Radford, am serving as a scribe at 7:06 PM on 1/7/2025 to document services personally performed by Alfred Valentine MD based on my observations and the provider's statements to me.      Alfred Valentine MD  01/07/25 [id number]

## 2025-01-08 NOTE — ED TRIAGE NOTES
Patient presents to ED via EMS d/t severe HA, dizziness, nausea. Per EMS report patient has hx PE, Migraines  Reports having a headache last 2 weeks come and ago that have been gradual, this HA is different because pain developed suddenly in R eye radiating to posterior. Denies traumatic injuries.    Takes eliquis d/t hx PE's       En route patient given 2.5mg droperidol IM,  15 mg Toradol IM       On arrival patient is A/ox4.  Reports taking 1000mg of tylenol with minimal relief.      Triage Assessment (Adult)       Row Name 01/07/25 2491          Triage Assessment    Airway WDL WDL        Respiratory WDL    Respiratory WDL WDL        Skin Circulation/Temperature WDL    Skin Circulation/Temperature WDL WDL

## 2025-01-11 ENCOUNTER — HOSPITAL ENCOUNTER (EMERGENCY)
Facility: CLINIC | Age: 35
Discharge: HOME | End: 2025-01-11
Attending: EMERGENCY MEDICINE | Admitting: EMERGENCY MEDICINE
Payer: COMMERCIAL

## 2025-01-11 ENCOUNTER — APPOINTMENT (OUTPATIENT)
Dept: CT IMAGING | Facility: CLINIC | Age: 35
End: 2025-01-11
Attending: EMERGENCY MEDICINE
Payer: COMMERCIAL

## 2025-01-11 ENCOUNTER — APPOINTMENT (OUTPATIENT)
Dept: ULTRASOUND IMAGING | Facility: CLINIC | Age: 35
End: 2025-01-11
Attending: EMERGENCY MEDICINE
Payer: COMMERCIAL

## 2025-01-11 VITALS
DIASTOLIC BLOOD PRESSURE: 82 MMHG | RESPIRATION RATE: 20 BRPM | HEART RATE: 106 BPM | WEIGHT: 230 LBS | HEIGHT: 62 IN | OXYGEN SATURATION: 99 % | SYSTOLIC BLOOD PRESSURE: 120 MMHG | TEMPERATURE: 98.9 F | BODY MASS INDEX: 42.33 KG/M2

## 2025-01-11 DIAGNOSIS — M79.605 PAIN OF LEFT LOWER EXTREMITY: ICD-10-CM

## 2025-01-11 DIAGNOSIS — R10.32 LEFT LOWER QUADRANT ABDOMINAL PAIN: ICD-10-CM

## 2025-01-11 LAB
ALBUMIN SERPL BCG-MCNC: 4.2 G/DL (ref 3.5–5.2)
ALBUMIN UR-MCNC: NEGATIVE MG/DL
ALP SERPL-CCNC: 70 U/L (ref 40–150)
ALT SERPL W P-5'-P-CCNC: 26 U/L (ref 0–50)
ANION GAP SERPL CALCULATED.3IONS-SCNC: 12 MMOL/L (ref 7–15)
APPEARANCE UR: CLEAR
AST SERPL W P-5'-P-CCNC: 14 U/L (ref 0–45)
BACTERIA #/AREA URNS HPF: ABNORMAL /HPF
BASOPHILS # BLD AUTO: 0.1 10E3/UL (ref 0–0.2)
BASOPHILS NFR BLD AUTO: 1 %
BILIRUB SERPL-MCNC: 0.4 MG/DL
BILIRUB UR QL STRIP: NEGATIVE
BUN SERPL-MCNC: 11.9 MG/DL (ref 6–20)
CALCIUM SERPL-MCNC: 9.2 MG/DL (ref 8.8–10.4)
CHLORIDE SERPL-SCNC: 106 MMOL/L (ref 98–107)
COLOR UR AUTO: ABNORMAL
CREAT SERPL-MCNC: 0.71 MG/DL (ref 0.51–0.95)
EGFRCR SERPLBLD CKD-EPI 2021: >90 ML/MIN/1.73M2
EOSINOPHIL # BLD AUTO: 0.2 10E3/UL (ref 0–0.7)
EOSINOPHIL NFR BLD AUTO: 2 %
ERYTHROCYTE [DISTWIDTH] IN BLOOD BY AUTOMATED COUNT: 13.9 % (ref 10–15)
GLUCOSE SERPL-MCNC: 112 MG/DL (ref 70–99)
GLUCOSE UR STRIP-MCNC: NEGATIVE MG/DL
HCG SERPL QL: NEGATIVE
HCO3 SERPL-SCNC: 22 MMOL/L (ref 22–29)
HCT VFR BLD AUTO: 40.3 % (ref 35–47)
HGB BLD-MCNC: 13.9 G/DL (ref 11.7–15.7)
HGB UR QL STRIP: NEGATIVE
HOLD SPECIMEN: NORMAL
IMM GRANULOCYTES # BLD: 0 10E3/UL
IMM GRANULOCYTES NFR BLD: 0 %
KETONES UR STRIP-MCNC: NEGATIVE MG/DL
LEUKOCYTE ESTERASE UR QL STRIP: NEGATIVE
LIPASE SERPL-CCNC: 35 U/L (ref 13–60)
LYMPHOCYTES # BLD AUTO: 2.1 10E3/UL (ref 0.8–5.3)
LYMPHOCYTES NFR BLD AUTO: 27 %
MCH RBC QN AUTO: 30.6 PG (ref 26.5–33)
MCHC RBC AUTO-ENTMCNC: 34.5 G/DL (ref 31.5–36.5)
MCV RBC AUTO: 89 FL (ref 78–100)
MONOCYTES # BLD AUTO: 0.6 10E3/UL (ref 0–1.3)
MONOCYTES NFR BLD AUTO: 7 %
MUCOUS THREADS #/AREA URNS LPF: PRESENT /LPF
NEUTROPHILS # BLD AUTO: 4.9 10E3/UL (ref 1.6–8.3)
NEUTROPHILS NFR BLD AUTO: 63 %
NITRATE UR QL: NEGATIVE
NRBC # BLD AUTO: 0 10E3/UL
NRBC BLD AUTO-RTO: 0 /100
PH UR STRIP: 6 [PH] (ref 5–7)
PLATELET # BLD AUTO: 251 10E3/UL (ref 150–450)
POTASSIUM SERPL-SCNC: 3.7 MMOL/L (ref 3.4–5.3)
PROT SERPL-MCNC: 6.4 G/DL (ref 6.4–8.3)
RBC # BLD AUTO: 4.54 10E6/UL (ref 3.8–5.2)
RBC URINE: 1 /HPF
SODIUM SERPL-SCNC: 140 MMOL/L (ref 135–145)
SP GR UR STRIP: 1.02 (ref 1–1.03)
SQUAMOUS EPITHELIAL: <1 /HPF
TROPONIN T SERPL HS-MCNC: 13 NG/L
TROPONIN T SERPL HS-MCNC: 15 NG/L
UROBILINOGEN UR STRIP-MCNC: NORMAL MG/DL
WBC # BLD AUTO: 7.8 10E3/UL (ref 4–11)
WBC URINE: 1 /HPF

## 2025-01-11 PROCEDURE — 36415 COLL VENOUS BLD VENIPUNCTURE: CPT | Performed by: EMERGENCY MEDICINE

## 2025-01-11 PROCEDURE — 99291 CRITICAL CARE FIRST HOUR: CPT | Mod: 25

## 2025-01-11 PROCEDURE — 81001 URINALYSIS AUTO W/SCOPE: CPT | Performed by: EMERGENCY MEDICINE

## 2025-01-11 PROCEDURE — 82435 ASSAY OF BLOOD CHLORIDE: CPT | Performed by: EMERGENCY MEDICINE

## 2025-01-11 PROCEDURE — 76830 TRANSVAGINAL US NON-OB: CPT

## 2025-01-11 PROCEDURE — 250N000013 HC RX MED GY IP 250 OP 250 PS 637: Performed by: EMERGENCY MEDICINE

## 2025-01-11 PROCEDURE — 85014 HEMATOCRIT: CPT | Performed by: EMERGENCY MEDICINE

## 2025-01-11 PROCEDURE — 74177 CT ABD & PELVIS W/CONTRAST: CPT

## 2025-01-11 PROCEDURE — 250N000011 HC RX IP 250 OP 636: Performed by: EMERGENCY MEDICINE

## 2025-01-11 PROCEDURE — 85004 AUTOMATED DIFF WBC COUNT: CPT | Performed by: EMERGENCY MEDICINE

## 2025-01-11 PROCEDURE — 84484 ASSAY OF TROPONIN QUANT: CPT | Performed by: EMERGENCY MEDICINE

## 2025-01-11 PROCEDURE — 93005 ELECTROCARDIOGRAM TRACING: CPT

## 2025-01-11 PROCEDURE — 84703 CHORIONIC GONADOTROPIN ASSAY: CPT | Performed by: EMERGENCY MEDICINE

## 2025-01-11 PROCEDURE — 83690 ASSAY OF LIPASE: CPT | Performed by: EMERGENCY MEDICINE

## 2025-01-11 PROCEDURE — 76856 US EXAM PELVIC COMPLETE: CPT

## 2025-01-11 PROCEDURE — 84155 ASSAY OF PROTEIN SERUM: CPT | Performed by: EMERGENCY MEDICINE

## 2025-01-11 RX ORDER — LIDOCAINE 4 G/G
1 PATCH TOPICAL ONCE
Status: DISCONTINUED | OUTPATIENT
Start: 2025-01-11 | End: 2025-01-11 | Stop reason: HOSPADM

## 2025-01-11 RX ORDER — PREGABALIN 100 MG/1
100 CAPSULE ORAL ONCE
Status: COMPLETED | OUTPATIENT
Start: 2025-01-11 | End: 2025-01-11

## 2025-01-11 RX ORDER — METHOCARBAMOL 500 MG/1
500 TABLET, FILM COATED ORAL ONCE
Status: COMPLETED | OUTPATIENT
Start: 2025-01-11 | End: 2025-01-11

## 2025-01-11 RX ORDER — IOPAMIDOL 755 MG/ML
100 INJECTION, SOLUTION INTRAVASCULAR ONCE
Status: COMPLETED | OUTPATIENT
Start: 2025-01-11 | End: 2025-01-11

## 2025-01-11 RX ORDER — LANOLIN ALCOHOL/MO/W.PET/CERES
1000 CREAM (GRAM) TOPICAL DAILY
COMMUNITY

## 2025-01-11 RX ORDER — HYDROMORPHONE HYDROCHLORIDE 2 MG/1
2 TABLET ORAL ONCE
Status: COMPLETED | OUTPATIENT
Start: 2025-01-11 | End: 2025-01-11

## 2025-01-11 RX ORDER — PANTOPRAZOLE SODIUM 40 MG/1
40 TABLET, DELAYED RELEASE ORAL ONCE
Status: COMPLETED | OUTPATIENT
Start: 2025-01-11 | End: 2025-01-11

## 2025-01-11 RX ORDER — ACETAMINOPHEN 500 MG
500 TABLET ORAL ONCE
Status: DISCONTINUED | OUTPATIENT
Start: 2025-01-11 | End: 2025-01-11

## 2025-01-11 RX ADMIN — APIXABAN 5 MG: 5 TABLET, FILM COATED ORAL at 11:07

## 2025-01-11 RX ADMIN — HYDROMORPHONE HYDROCHLORIDE 2 MG: 2 TABLET ORAL at 07:25

## 2025-01-11 RX ADMIN — PANTOPRAZOLE SODIUM 40 MG: 40 TABLET, DELAYED RELEASE ORAL at 11:07

## 2025-01-11 RX ADMIN — PREGABALIN 100 MG: 100 CAPSULE ORAL at 11:07

## 2025-01-11 RX ADMIN — IOPAMIDOL 100 ML: 755 INJECTION, SOLUTION INTRAVENOUS at 10:38

## 2025-01-11 RX ADMIN — METHOCARBAMOL 500 MG: 500 TABLET ORAL at 09:34

## 2025-01-11 ASSESSMENT — ACTIVITIES OF DAILY LIVING (ADL)
ADLS_ACUITY_SCORE: 67

## 2025-01-11 ASSESSMENT — COLUMBIA-SUICIDE SEVERITY RATING SCALE - C-SSRS
1. IN THE PAST MONTH, HAVE YOU WISHED YOU WERE DEAD OR WISHED YOU COULD GO TO SLEEP AND NOT WAKE UP?: NO
6. HAVE YOU EVER DONE ANYTHING, STARTED TO DO ANYTHING, OR PREPARED TO DO ANYTHING TO END YOUR LIFE?: NO

## 2025-01-11 NOTE — ED PROVIDER NOTES
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 Epithelials Urine <1     HCG QUALITATIVE PREGNANCY - Normal    hCG Serum Qualitative Negative     LIPASE - Normal    Lipase 35     TROPONIN T, HIGH SENSITIVITY - Normal    Troponin T, High Sensitivity 13     CBC WITH PLATELETS AND  DIFFERENTIAL    WBC Count 7.8      RBC Count 4.54      Hemoglobin 13.9      Hematocrit 40.3      MCV 89      MCH 30.6      MCHC 34.5      RDW 13.9      Platelet Count 251      % Neutrophils 63      % Lymphocytes 27      % Monocytes 7      % Eosinophils 2      % Basophils 1      % Immature Granulocytes 0      NRBCs per 100 WBC 0      Absolute Neutrophils 4.9      Absolute Lymphocytes 2.1      Absolute Monocytes 0.6      Absolute Eosinophils 0.2      Absolute Basophils 0.1      Absolute Immature Granulocytes 0.0      Absolute NRBCs 0.0       Imaging   CT Abdomen Pelvis w Contrast   Final Result   IMPRESSION:   1.  No evidence of acute pathology in the abdomen and pelvis.            US Pelvic Complete with Transvaginal   Final Result   IMPRESSION:   1.  A few likely endometrial calcifications are nonspecific.   2.  Otherwise normal study.        EKG   ECG results from 01/11/25   EKG 12-lead, tracing only     Value    Systolic Blood Pressure     Diastolic Blood Pressure     Ventricular Rate 96    Atrial Rate 96    OH Interval 144    QRS Duration 80        QTc 444    P Axis 35    R AXIS 76    T Axis 19    Interpretation ECG      Sinus rhythm  Normal ECG  When compared with ECG of 07-Jan-2025 18:55,  Minimal criteria for Anterior infarct are no longer Present       *Note: Due to a large number of results and/or encounters for the requested time period, some results have not been displayed. A complete set of results can be found in Results Review.     Independent Interpretation   None    ED Course      Medications Administered   Medications   Lidocaine (LIDOCARE) 4 % Patch 1 patch (1 patch Transdermal Not Given 1/11/25 0656)   HYDROmorphone (DILAUDID) tablet 2 mg (2 mg Oral $Given 1/11/25 0725)   methocarbamol (ROBAXIN) tablet 500 mg (500 mg Oral $Given 1/11/25 0934)   iopamidol (ISOVUE-370) solution 100 mL (100 mLs Intravenous $Given 1/11/25 1038)   sodium chloride (PF) 0.9% PF flush 65 mL (65 mLs Intravenous $Given  1/11/25 1038)   apixaban ANTICOAGULANT (ELIQUIS) tablet 5 mg (5 mg Oral $Given 1/11/25 1107)   pantoprazole (PROTONIX) EC tablet 40 mg (40 mg Oral $Given 1/11/25 1107)   pregabalin (LYRICA) capsule 100 mg (100 mg Oral $Given 1/11/25 1107)      Discussion of Management   Social Work, Roseline Howe    ED Course   ED Course as of 01/11/25 1255   Sat Jan 11, 2025   0622 I obtained history and examined the patient as noted above.      0638 Patient ambulatory to the bathroom.     0715 Patient refusing to go to ultrasound until she receives pain medications.     0718 I rechecked the patient.      0920 I rechecked the patient and explained findings. She is complaining of focal left lower quadrant abdominal tenderness.     1115 I rechecked the patient. Patient appears comfortable and using her iPad.     1152 I rechecked the patient. I discussed findings and discharge with the patient. All questions answered.      1223 Spoke with Roseline Howe, social work, regarding the patient.     1234 Patient requesting to be discharged. Social work will call her.       Additional Documentation  None    Medical Decision Making / Diagnosis     CMS Diagnoses: None    MIPS   None    MDM   Nevin Alvarado is a 34 year old female with a complex past medical history presenting to the emergency department for evaluation of abdominal pain and leg pain.  VS on presentation reveal mild tachycardia, which improved during ED course, and otherwise are unremarkable.  History obtained from patient, as well as supplemented by extensive review of patient's EMR, including outlined care plan.  In accordance with her care plan, we provided further supportive treatments including oral analgesia.  We are somewhat limited given patient's concurrent use of Eliquis, and allergy profile.  On reassessment, she was noting some improvements in symptoms.  Laboratory workup was undertaken revealing unremarkable CBC, metabolic panel, urinalysis and negative hCG.  On  further discussion of patient's symptomatology, her main pain appears to stem from the left lower quadrant.  We did begin with a pelvic ultrasound, which unfortunately, given limitations from overlying bowel gas was unable to definitively visualize the ovary.  On reassessment, acknowledging patient's care plan, and following thorough discussion of risks/benefits, patient wishes to proceed with CT. fortunately, no evidence of ovarian pathology or other acute intra-abdominal processes noted.  I considered bursitis, though the distribution of her symptoms seems quite atypical for this.  I do question a component of lumbar radiculopathy given the radiation of pain down towards her foot.  No new focal neurologic deficits are appreciated the patient is ambulatory independently with a steady stable gait.  She has previously undergone previous evaluation with spine surgery, [facility], and has received multiple spinal injections in the past.  I do acknowledge her history of anticoagulant use, though given duration of symptoms, absence of progression of neurologic symptoms I feel this is unlikely represent spinal epidural hematoma and feel spinal imaging can be deferred safely.  On review of previous records, she does often times have issues with low back pain and radicular symptoms.  She additionally underwent MR imaging of her entire spine in November of last year without evidence of acute pathology.  I currently feel etiology such as cauda equina or acute cord compression to be unlikely.    On reassessment, we discussed results of the above studies.  Patient is understandably frustrated regarding her ongoing symptoms without clear explanation.  I did offer her the opportunity to speak with [facility] which she politely declined, acknowledging she does have resources established in the outpatient environment.  She denies any thoughts of self-harm or suicide and is not deemed holdable against her well in the ED.  She does  have a PCP, though states that she has been unable to establish an appointment with them for the next few months.  As such I did engage our social work to see if they could help establish more expedited close outpatient follow-up to help manage patient's complex care.  She was open to this.  Appreciate social work assistance.  At this time, with reasonable clinical certainty I feel patient can safely be discharged from the ED.  We discussed that she may return to the ER in the meantime should she develop any other new or troubling symptoms.  Patient is comfortable with outlined plan of care.  Questions been answered prior to discharge    Disposition   The patient was discharged.     Diagnosis     ICD-10-CM    1. Left lower quadrant abdominal pain  R10.32       2. Pain of left lower extremity  M79.605           Scribe Disclosure:  I, Ivan oFurnier, am serving as a scribe at 7:19 AM on 1/11/2025 to document services personally performed by Reece Forman MD based on my observations and the provider's statements to me.        Reece Forman MD  01/11/25 5191

## 2025-01-11 NOTE — DISCHARGE INSTRUCTIONS
Please monitor symptoms closely.  I would recommend close follow-up with a primary team to further help manage your care.    If you develop any new or troubling symptoms, please return to the ER.  We are more than happy to reassess.    Follow-up appointment    Nevin Alvarado [facility]  [address], [place] [address]     Date: Tuesday, January 14th   Time: 2:40pm  Provider: Daly Mcclellan NP    Please call [phone] if you need to make any changes to this appointment    Discharge Instructions  Abdominal Pain    Abdominal pain (belly pain) can be caused by many things. Your evaluation today does not show the exact cause for your pain. Your provider today has decided that it is unlikely your pain is due to a life threatening problem, or a problem requiring surgery or hospital admission. Sometimes those problems cannot be found right away, so it is very important that you follow up as directed.  Sometimes only the changes which occur over time allow the cause of your pain to be found.    Generally, every Emergency Department visit should have a follow-up clinic visit with either a primary or a specialty clinic/provider. Please follow-up as instructed by your emergency provider today. With abdominal pain, we often recommend very close follow-up, such as the following day.    ADULTS:  Return to the Emergency Department right away if:    You get an oral temperature above 102oF or as directed by your provider.  You have blood in your stools. This may be bright red or appear as black, tarry stools.    You keep vomiting (throwing up) or cannot drink liquids.  You see blood when you vomit.   You cannot have a bowel movement or you cannot pass gas.  Your stomach gets bloated or bigger.  Your skin or the whites of your eyes look yellow.  You faint.  You have bloody, frequent or painful urination (peeing).  You have new symptoms or anything that worries you.    CHILDREN:   Return to the Emergency Department right away if your child has any of the above-listed symptoms or the following:    Pushes your hand away or screams/cries when his/her belly is touched.  You notice your child is very fussy or weak.  Your child is very tired and is too tired to eat or drink.  Your child is dehydrated.  Signs of dehydration can be:  Significant change in the amount of wet diapers/urine.  Your infant or child starts to have dry mouth and lips, or no saliva (spit) or tears.    PREGNANT WOMEN:  Return to the Emergency Department right away if you have any of the above-listed symptoms or the following:    You have bleeding, leaking fluid or passing tissue from the vagina.  You have worse pain or cramping, or pain in your shoulder or back.  You have vomiting that will not stop.  You have a temperature of 100oF or more.  Your baby is not moving as much as usual.  You faint.  You get a bad headache with or without eye problems and abdominal pain.  You have a seizure.  You have unusual discharge from your vagina and abdominal pain.    Abdominal pain is pretty common during pregnancy.  Your pain may or may not be related to your pregnancy. You should follow-up closely with your OB provider so they can evaluate you and your baby.  Until you follow-up with your regular provider, do the following:     Avoid sex and do not put anything in your vagina.  Drink clear fluids.  Only take medications approved by your provider.    MORE INFORMATION:    Appendicitis:  A possible cause of abdominal pain in any person who still has their appendix is acute appendicitis. Appendicitis is often hard to diagnose.  Testing does not always rule out early appendicitis or other causes of abdominal pain. Close follow-up with your provider and re-evaluations may be needed to figure out the reason for your abdominal pain.    Follow-up:  It is very important that you make an appointment with your clinic and go to the appointment.  If  "you do not follow-up with your primary provider, it may result in missing an important development which could result in permanent injury or disability and/or lasting pain.  If there is any problem keeping your appointment, call your provider or return to the Emergency Department.    Medications:  Take your medications as directed by your provider today.  Before using over-the-counter medications, ask your provider and make sure to take the medications as directed.  If you have any questions about medications, ask your provider.    Diet:  Resume your normal diet as much as possible, but do not eat fried, fatty or spicy foods while you have pain.  Do not drink alcohol or have caffeine.  Do not smoke tobacco.    Probiotics: If you have been given an antibiotic, you may want to also take a probiotic pill or eat yogurt with live cultures. Probiotics have \"good bacteria\" to help your intestines stay healthy. Studies have shown that probiotics help prevent diarrhea (loose stools) and other intestine problems (including C. diff infection) when you take antibiotics. You can buy these without a prescription in the pharmacy section of the store.     If you were given a prescription for medicine here today, be sure to read all of the information (including the package insert) that comes with your prescription.  This will include important information about the medicine, its side effects, and any warnings that you need to know about.  The pharmacist who fills the prescription can provide more information and answer questions you may have about the medicine.  If you have questions or concerns that the pharmacist cannot address, please call or return to the Emergency Department.       Remember that you can always come back to the Emergency Department if you are not able to see your regular provider in the amount of time listed above, if you get any new symptoms, or if there is anything that worries you.    "

## 2025-01-11 NOTE — PHARMACY-ADMISSION MEDICATION HISTORY
Pharmacist Admission Medication History    Admission medication history is complete. The information provided in this note is only as accurate as the sources available at the time of the update.    Information Source(s): Patient and CareEverywhere/SureScripts via in-person    Pertinent Information:     Changes made to PTA medication list:  Added: Tizanidine and amitriptyline  Deleted: prednisone taper  Changed: None    Allergies reviewed with patient and updates made in EHR: no    Medication History Completed By: Flower Mcdaniel RPH 1/11/2025 10:52 AM    PTA Med List   Medication Sig Last Dose/Taking    albuterol (PROAIR HFA/PROVENTIL HFA/VENTOLIN HFA) 108 (90 Base) MCG/ACT inhaler Inhale 2 puffs into the lungs every 6 hours as needed for shortness of breath / dyspnea or wheezing Unknown    albuterol (PROVENTIL) (2.5 MG/3ML) 0.083% neb solution Take 1 vial (2.5 mg) by nebulization every 6 hours as needed for shortness of breath or wheezing Unknown    amitriptyline (ELAVIL) 25 MG tablet Take 35 mg by mouth at bedtime. 10 mg plus 25 mg = 35 mg 1/10/2025 Bedtime    apixaban ANTICOAGULANT (ELIQUIS) 5 MG tablet Take 5 mg by mouth 2 times daily. 1/10/2025 Evening    cetirizine (ZYRTEC) 10 MG tablet Take 1 tablet (10 mg) by mouth daily 1/10/2025 Morning    Cholecalciferol (D3 HIGH POTENCY) 25 MCG (1000 UT) CAPS Take 50 mcg by mouth daily 1/10/2025 Morning    clonazePAM (KLONOPIN) 0.5 MG tablet Take 1 tablet (0.5 mg) by mouth daily as needed for anxiety Unknown    cyanocobalamin (VITAMIN B-12) 1000 MCG tablet Take 1,000 mcg by mouth daily. 1/10/2025 Morning    ferrous sulfate (FEROSUL) 325 (65 Fe) MG tablet Take 1 tablet (325 mg) by mouth daily (with breakfast) 1/10/2025 Morning    hydrOXYzine HCl (ATARAX) 25 MG tablet Take 25 mg by mouth every 8 hours as needed for anxiety. Unknown    norethindrone (AYGESTIN) 5 MG tablet Take 5 mg by mouth daily 1/10/2025 Morning    pantoprazole (PROTONIX) 40 MG EC tablet Take 1 tablet (40  mg) by mouth 2 times daily. 1/10/2025 Evening    polyethylene glycol (MIRALAX) 17 GM/Dose powder Take 17 g by mouth daily as needed for constipation Unknown    pregabalin (LYRICA) 100 MG capsule Take 100 mg by mouth 3 times daily. One table in the morning and two tablets at night 1/10/2025 Evening    PSYLLIUM PO Take 1 tsp by mouth daily as needed (as needed). Unknown    Semaglutide, 1 MG/DOSE, (OZEMPIC) 4 MG/3ML pen Inject 1 mg subcutaneously every 7 days. Past Week    Sodium Phosphates (FLEET ENEMA) ENEM Place 1 enema rectally as needed (as needed). Unknown    tiZANidine (ZANAFLEX) 4 MG tablet Take 4 mg by mouth at bedtime. 1/10/2025 Bedtime

## 2025-01-11 NOTE — ED TRIAGE NOTES
Pt brought in by ambulance for LLQ abd pain that radiates into her chest. Pt reports left hip pain also that she has had for several weeks. Pt also concerned about her some labs she had drawn 2 days ago. EMS gave nitroglycerin x1 and ASA 325mg.

## 2025-01-11 NOTE — CONSULTS
ALLAN made the following outpatient appointment for the patient per MD request:     Follow-up appointment    Jenny Nevin N  [facility]  [address], [place] [address]     Date: Tuesday, January 14th   Time: 2:40pm  Provider: Daly Mcclellan NP    Please call [phone] if you need to make any changes to this appointment    SW met with patient at bedside briefly before discharge to share information about follow up appointment. Patient says she may need to push the time back by 10 minutes and is going to call and request a later time.    MYLES Aaron, [facility]   Care Coordinator-Casual  [email or web address]

## 2025-01-12 PROCEDURE — 99283 EMERGENCY DEPT VISIT LOW MDM: CPT

## 2025-01-13 ENCOUNTER — HOSPITAL ENCOUNTER (EMERGENCY)
Facility: CLINIC | Age: 35
Discharge: HOME | End: 2025-01-13
Attending: EMERGENCY MEDICINE | Admitting: EMERGENCY MEDICINE
Payer: COMMERCIAL

## 2025-01-13 VITALS
SYSTOLIC BLOOD PRESSURE: 143 MMHG | DIASTOLIC BLOOD PRESSURE: 109 MMHG | RESPIRATION RATE: 18 BRPM | BODY MASS INDEX: 42.07 KG/M2 | TEMPERATURE: 98.5 F | HEART RATE: 106 BPM | OXYGEN SATURATION: 98 % | HEIGHT: 62 IN

## 2025-01-13 DIAGNOSIS — R10.84 GENERALIZED ABDOMINAL PAIN: ICD-10-CM

## 2025-01-13 LAB
ATRIAL RATE - MUSE: 96 BPM
DIASTOLIC BLOOD PRESSURE - MUSE: NORMAL MMHG
INTERPRETATION ECG - MUSE: NORMAL
P AXIS - MUSE: 35 DEGREES
PR INTERVAL - MUSE: 144 MS
QRS DURATION - MUSE: 80 MS
QT - MUSE: 352 MS
QTC - MUSE: 444 MS
R AXIS - MUSE: 76 DEGREES
SYSTOLIC BLOOD PRESSURE - MUSE: NORMAL MMHG
T AXIS - MUSE: 19 DEGREES
VENTRICULAR RATE- MUSE: 96 BPM
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
## 2025-01-13 NOTE — ED TRIAGE NOTES
Brought in by EMS. Arrives for abdominal pain and constipation. Seen in this ED yesterday for same symptoms. Pt reports nothing was found at that time. Symptoms continued and pain worsening. Attempted 2 enemas and miralax at home. Able to eat but nauseated. Got 4 mg zofran with EMS. VSS.

## 2025-01-14 ENCOUNTER — HOSPITAL ENCOUNTER (EMERGENCY)
Facility: CLINIC | Age: 35
Discharge: HOME | End: 2025-01-14
Attending: EMERGENCY MEDICINE | Admitting: EMERGENCY MEDICINE
Payer: COMMERCIAL

## 2025-01-14 VITALS
DIASTOLIC BLOOD PRESSURE: 81 MMHG | OXYGEN SATURATION: 100 % | SYSTOLIC BLOOD PRESSURE: 144 MMHG | TEMPERATURE: 97.5 F | HEART RATE: 2 BPM | RESPIRATION RATE: 18 BRPM

## 2025-01-14 DIAGNOSIS — R10.9 ABDOMINAL CRAMPING: ICD-10-CM

## 2025-01-14 DIAGNOSIS — K59.00 CONSTIPATION, UNSPECIFIED CONSTIPATION TYPE: ICD-10-CM

## 2025-01-14 LAB
ALBUMIN UR-MCNC: 30 MG/DL
ALBUMIN UR-MCNC: 70 MG/DL
APPEARANCE UR: ABNORMAL
APPEARANCE UR: ABNORMAL
ATRIAL RATE - MUSE: 77 BPM
BACTERIA #/AREA URNS HPF: ABNORMAL /HPF
BACTERIA #/AREA URNS HPF: ABNORMAL /HPF
BILIRUB UR QL STRIP: NEGATIVE
BILIRUB UR QL STRIP: NEGATIVE
COLOR UR AUTO: YELLOW
COLOR UR AUTO: YELLOW
DIASTOLIC BLOOD PRESSURE - MUSE: NORMAL MMHG
GLUCOSE UR STRIP-MCNC: 30 MG/DL
GLUCOSE UR STRIP-MCNC: NEGATIVE MG/DL
HGB UR QL STRIP: ABNORMAL
HGB UR QL STRIP: ABNORMAL
HOLD SPECIMEN: NORMAL
INTERPRETATION ECG - MUSE: NORMAL
KETONES UR STRIP-MCNC: 20 MG/DL
KETONES UR STRIP-MCNC: 40 MG/DL
LACTATE SERPL-SCNC: 1.2 MMOL/L (ref 0.7–2)
LEUKOCYTE ESTERASE UR QL STRIP: NEGATIVE
LEUKOCYTE ESTERASE UR QL STRIP: NEGATIVE
MUCOUS THREADS #/AREA URNS LPF: PRESENT /LPF
MUCOUS THREADS #/AREA URNS LPF: PRESENT /LPF
NITRATE UR QL: NEGATIVE
NITRATE UR QL: NEGATIVE
P AXIS - MUSE: 39 DEGREES
PH UR STRIP: 5.5 [PH] (ref 5–7)
PH UR STRIP: 6 [PH] (ref 5–7)
PR INTERVAL - MUSE: 148 MS
QRS DURATION - MUSE: 76 MS
QT - MUSE: 396 MS
QTC - MUSE: 448 MS
R AXIS - MUSE: 60 DEGREES
RBC URINE: 3 /HPF
RBC URINE: 5 /HPF
SP GR UR STRIP: 1.03 (ref 1–1.03)
SP GR UR STRIP: 1.03 (ref 1–1.03)
SQUAMOUS EPITHELIAL: 3 /HPF
SQUAMOUS EPITHELIAL: 3 /HPF
SYSTOLIC BLOOD PRESSURE - MUSE: NORMAL MMHG
T AXIS - MUSE: 8 DEGREES
TROPONIN T SERPL HS-MCNC: 10 NG/L
UROBILINOGEN UR STRIP-MCNC: 2 MG/DL
UROBILINOGEN UR STRIP-MCNC: NORMAL MG/DL
VENTRICULAR RATE- MUSE: 77 BPM
WBC URINE: 21 /HPF
WBC URINE: 25 /HPF

## 2025-01-14 PROCEDURE — 36415 COLL VENOUS BLD VENIPUNCTURE: CPT | Performed by: EMERGENCY MEDICINE

## 2025-01-14 PROCEDURE — 96374 THER/PROPH/DIAG INJ IV PUSH: CPT | Mod: 59

## 2025-01-14 PROCEDURE — 81001 URINALYSIS AUTO W/SCOPE: CPT | Performed by: EMERGENCY MEDICINE

## 2025-01-14 PROCEDURE — 84484 ASSAY OF TROPONIN QUANT: CPT | Performed by: EMERGENCY MEDICINE

## 2025-01-14 PROCEDURE — 250N000011 HC RX IP 250 OP 636: Performed by: EMERGENCY MEDICINE

## 2025-01-14 PROCEDURE — 96375 TX/PRO/DX INJ NEW DRUG ADDON: CPT

## 2025-01-14 PROCEDURE — 96361 HYDRATE IV INFUSION ADD-ON: CPT

## 2025-01-14 PROCEDURE — 87086 URINE CULTURE/COLONY COUNT: CPT | Performed by: EMERGENCY MEDICINE

## 2025-01-14 PROCEDURE — 258N000003 HC RX IP 258 OP 636: Performed by: EMERGENCY MEDICINE

## 2025-01-14 PROCEDURE — 83605 ASSAY OF LACTIC ACID: CPT | Performed by: EMERGENCY MEDICINE

## 2025-01-14 PROCEDURE — 93005 ELECTROCARDIOGRAM TRACING: CPT

## 2025-01-14 PROCEDURE — 99284 EMERGENCY DEPT VISIT MOD MDM: CPT | Mod: 25

## 2025-01-14 RX ORDER — DROPERIDOL 2.5 MG/ML
1.25 INJECTION, SOLUTION INTRAMUSCULAR; INTRAVENOUS ONCE
Status: COMPLETED | OUTPATIENT
Start: 2025-01-14 | End: 2025-01-14

## 2025-01-14 RX ORDER — METOCLOPRAMIDE HYDROCHLORIDE 5 MG/ML
5 INJECTION INTRAMUSCULAR; INTRAVENOUS ONCE
Status: COMPLETED | OUTPATIENT
Start: 2025-01-14 | End: 2025-01-14

## 2025-01-14 RX ADMIN — DROPERIDOL 1.25 MG: 2.5 INJECTION, SOLUTION INTRAMUSCULAR; INTRAVENOUS at 08:27

## 2025-01-14 RX ADMIN — SODIUM CHLORIDE 1000 ML: 9 INJECTION, SOLUTION INTRAVENOUS at 08:26

## 2025-01-14 RX ADMIN — METOCLOPRAMIDE HYDROCHLORIDE 5 MG: 5 INJECTION INTRAMUSCULAR; INTRAVENOUS at 09:45

## 2025-01-14 ASSESSMENT — ACTIVITIES OF DAILY LIVING (ADL)
ADLS_ACUITY_SCORE: 67

## 2025-01-14 NOTE — ED TRIAGE NOTES
Pt comes from home by EMS for abdominal pain. Per pt, she has not had a bowel movement in 2 weeks. Abdominal pain started Saturday and has been getting worse. Was at [facility] last night, they gave her 2 bottle of mag citrate. CT done, pt does not know what the results were. She drank 1 bottle this morning at 0320 and laid in bed to wait for the urge to have BM, when she got up, she started vomiting. Per EMS the vomit in the toliet looked like bile.

## 2025-01-14 NOTE — DISCHARGE INSTRUCTIONS
As discussed, your workup from [facility] last night and your other lab tests from us today are reassuring.  You feel much improved after having a large bowel movement.  Keep up with your stool regimen, considering MiraLAX on a daily basis.  Never hesitate to return to the ER for worsening of condition or if you have other emergent concerns.    Discharge Instructions  Abdominal Pain    Abdominal pain (belly pain) can be caused by many things. Your evaluation today does not show the exact cause for your pain. Your provider today has decided that it is unlikely your pain is due to a life threatening problem, or a problem requiring surgery or hospital admission. Sometimes those problems cannot be found right away, so it is very important that you follow up as directed.  Sometimes only the changes which occur over time allow the cause of your pain to be found.    Generally, every Emergency Department visit should have a follow-up clinic visit with either a primary or a specialty clinic/provider. Please follow-up as instructed by your emergency provider today. With abdominal pain, we often recommend very close follow-up, such as the following day.    ADULTS:  Return to the Emergency Department right away if:    You get an oral temperature above 102oF or as directed by your provider.  You have blood in your stools. This may be bright red or appear as black, tarry stools.    You keep vomiting (throwing up) or cannot drink liquids.  You see blood when you vomit.   You cannot have a bowel movement or you cannot pass gas.  Your stomach gets bloated or bigger.  Your skin or the whites of your eyes look yellow.  You faint.  You have bloody, frequent or painful urination (peeing).  You have new symptoms or anything that worries you.    CHILDREN:  Return to the Emergency Department right away if your child has any of the above-listed symptoms or the following:    Pushes your hand away or screams/cries when his/her belly is  touched.  You notice your child is very fussy or weak.  Your child is very tired and is too tired to eat or drink.  Your child is dehydrated.  Signs of dehydration can be:  Significant change in the amount of wet diapers/urine.  Your infant or child starts to have dry mouth and lips, or no saliva (spit) or tears.    PREGNANT WOMEN:  Return to the Emergency Department right away if you have any of the above-listed symptoms or the following:    You have bleeding, leaking fluid or passing tissue from the vagina.  You have worse pain or cramping, or pain in your shoulder or back.  You have vomiting that will not stop.  You have a temperature of 100oF or more.  Your baby is not moving as much as usual.  You faint.  You get a bad headache with or without eye problems and abdominal pain.  You have a seizure.  You have unusual discharge from your vagina and abdominal pain.    Abdominal pain is pretty common during pregnancy.  Your pain may or may not be related to your pregnancy. You should follow-up closely with your OB provider so they can evaluate you and your baby.  Until you follow-up with your regular provider, do the following:     Avoid sex and do not put anything in your vagina.  Drink clear fluids.  Only take medications approved by your provider.    MORE INFORMATION:    Appendicitis:  A possible cause of abdominal pain in any person who still has their appendix is acute appendicitis. Appendicitis is often hard to diagnose.  Testing does not always rule out early appendicitis or other causes of abdominal pain. Close follow-up with your provider and re-evaluations may be needed to figure out the reason for your abdominal pain.    Follow-up:  It is very important that you make an appointment with your clinic and go to the appointment.  If you do not follow-up with your primary provider, it may result in missing an important development which could result in permanent injury or disability and/or lasting pain.  If  "there is any problem keeping your appointment, call your provider or return to the Emergency Department.    Medications:  Take your medications as directed by your provider today.  Before using over-the-counter medications, ask your provider and make sure to take the medications as directed.  If you have any questions about medications, ask your provider.    Diet:  Resume your normal diet as much as possible, but do not eat fried, fatty or spicy foods while you have pain.  Do not drink alcohol or have caffeine.  Do not smoke tobacco.    Probiotics: If you have been given an antibiotic, you may want to also take a probiotic pill or eat yogurt with live cultures. Probiotics have \"good bacteria\" to help your intestines stay healthy. Studies have shown that probiotics help prevent diarrhea (loose stools) and other intestine problems (including C. diff infection) when you take antibiotics. You can buy these without a prescription in the pharmacy section of the store.     If you were given a prescription for medicine here today, be sure to read all of the information (including the package insert) that comes with your prescription.  This will include important information about the medicine, its side effects, and any warnings that you need to know about.  The pharmacist who fills the prescription can provide more information and answer questions you may have about the medicine.  If you have questions or concerns that the pharmacist cannot address, please call or return to the Emergency Department.       Remember that you can always come back to the Emergency Department if you are not able to see your regular provider in the amount of time listed above, if you get any new symptoms, or if there is anything that worries you.    "
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
## 2025-01-15 LAB — BACTERIA UR CULT: NORMAL

## 2025-01-22 ENCOUNTER — VIRTUAL VISIT (OUTPATIENT)
Dept: ENDOCRINOLOGY | Facility: CLINIC | Age: 35
End: 2025-01-22
Payer: COMMERCIAL

## 2025-01-22 VITALS — WEIGHT: 230 LBS | BODY MASS INDEX: 42.33 KG/M2 | HEIGHT: 62 IN

## 2025-01-22 DIAGNOSIS — Z71.3 NUTRITIONAL COUNSELING: Primary | ICD-10-CM

## 2025-01-22 DIAGNOSIS — E66.9 OBESITY: ICD-10-CM

## 2025-01-22 DIAGNOSIS — E11.9 TYPE 2 DIABETES MELLITUS WITHOUT COMPLICATION, WITHOUT LONG-TERM CURRENT USE OF INSULIN (H): ICD-10-CM

## 2025-01-22 ASSESSMENT — PAIN SCALES - GENERAL: PAINLEVEL_OUTOF10: MILD PAIN (3)

## 2025-01-22 NOTE — NURSING NOTE
Current patient location: home     Is the patient currently in the state of [place]? YES    Visit mode: VIDEO    If the visit is dropped, the patient can be reconnected by:VIDEO VISIT: Text to cell phone:   Telephone Information:   Mobile [phone]       Will anyone else be joining the visit? NO  (If patient encounters technical issues they should call [phone]    Are changes needed to the allergy or medication list? N/A    Are refills needed on medications prescribed by this physician? NO    Rooming Documentation:  N/A      Reason for visit: RECHECK    Wt other than 24 hrs:    Pain more than one location:  no  [facility]     Er for pain and constipation.

## 2025-01-22 NOTE — LETTER
"1/22/2025       RE: Nevin Alvarado  [address]  [facility] [address]     Dear Colleague,    Thank you for referring your patient, Nevin Alvarado, to the [facility] [place] at [facility]. Please see a copy of my visit note below.    Video-Visit Details    Type of service:  Video Visit    Video Start Time: 12:57 PM  Video End Time: 1:17 PM     Originating Location (pt. Location): Home    Distant Location (provider location): Offsite (providers home) [facility] [place]     Platform used for Video Visit: Dolphin Geeks    Return Nutrition Consultation Note    Reason For Visit: Nutrition Assessment    Nevin Alvarado is a 34 year old presenting today for return nutrition medical weight management consult.  Patient is accompanied by self.     Pt referred by Sharon Toro NP on September 12, 2023.  CO-MORBIDITIES OF OBESITY INCLUDE:        9/12/2023    12:48 PM   --   I have the following health issues associated with obesity Type II Diabetes    High Blood Pressure    Sleep Apnea    Polycystic Ovarian Syndrome    GERD (Reflux)    Fatty Liver    Asthma    Stress Incontinence     SUPPORT:      9/12/2023    12:48 PM   Support System Reviewed With Patient   Who do you have in your support network that can be available to help you for the first 2 weeks after surgery? I live in a group home with 24 hour staff, mom   Who can you count on for support throughout your weight loss surgery journey? a friend, and my therapist     ANTHROPOMETRICS:  Initial Consult Weight: 309 lb on 9/13/23  Estimated body mass index is 42.07 kg/m  as calculated from the following:    Height as of this encounter: 1.575 m (5' 2\").    Weight as of this encounter: 104.3 kg (230 lb).  Current Weight: 230 lb per pt report     Wt Readings from Last 5 Encounters:   01/22/25 104.3 kg (230 lb)   01/11/25 104.3 kg (230 lb) "   01/07/25 104.3 kg (230 lb)   12/30/24 99.8 kg (220 lb)   12/16/24 105.7 kg (233 lb 0.4 oz)         9/12/2023    12:48 PM   --   I have tried the following methods to lose weight Watching portions or calories    Exercise    Pre packaged meals ex: Nutrisystem    OTC Medications    Prescription Medications         9/12/2023    12:48 PM   Weight Loss Questions Reviewed With Patient   How long have you been overweight? Since late teens through early 20's     MEDICATION FOR WEIGHT LOSS:  Ozempic 1 mg     Hx of self harm- swallowing thing- started after she was treated for anorexia when taking topiramate- in 6 months has only had one day of self harm- this was 2 weeks ago and instead of swallowing she inserted something per rectum. - Followed by MASON Potter PA-C, PE in 2019 after esophageal perforation repair     VITAMIN/MINERAL SUPPLEMENTS:  Iron, Vitamin B12, Vitamin D     NUTRITION HISTORY:  Food allergies:NKFA  Food intolerances: None  Previous experience with diet modification for weight loss: Nutrisystem, prescription medications, exercise, watching calories or portions     Has been meal planning for the past 3 months. Likes chicken, turkey, shrimp.      October 2023: Eating 3 meals per day. Lunch and dinner meals have been chili or mediterranean orzo salad with artichokes more recently. Drinks mostly water, Cup of coffee, [employer]. Doesn't drink soda or juice. Uses a walker for neuropathy. Walking around more often/standing longer which has been an improvement.     November 2023: Reports she has been sick a lot recently. Sometimes feels she has to force herself to eat. Currently has a cold. Working with a GI doctor right now also as she has been having some GI symptoms after eating certain foods. Reports she had another self harm episode unfortunately, feels this will delay her chances for surgery.     January 2024: Had been dealing with illness recently which set her back a little bit on her  goals. Has been using Wegovy. Feels since starting the Wegovy her face gets puffy or red blotches on it, plans to keep a close eye on it before deciding to stop. Had been eating very well but finding more temptations now. Feels she is not cooking as often and doing more frozen meals.  Hasn't been using walker for a few weeks.     April 2024: Since visiting Sharon nutrition has been off and on since she was in the hospital and multiple trips to the ED. Currently being treated for pneumonia, PE, depression and anxiety. Reports she continues to have constipation/diarrhea on and off. Has Miralax as needed. Also has enema prescription also. Nutrition has been very challenging over the last month due to being in the hospital for 15 days. She did continue to do her Wegovy injections while in the hospital. Plan is to focus on getting healthy and feeling better then will work on her good eating habits again.      June 2024: Things have been going a lot better for patient since last visit. She switched from Wegovy to Ozempic over the last month. Eating 2-3 meals a day. Bowel habits have improved since last RD visit. Physical activity has gotten a lot better with the weight loss. Does not use walker anymore. Walking around a lot. Also spoke with a diabetes educator in May.     August 2024: Psychologist recommended patient not have surgery. Patient is ok with this and is happy with her weight loss on AOMs.     Last few months have been harder due to health conditions she has been dealing with and some stress around her guardianship and housing. She is trying her best to eat healthfully but hasn't found much interest in cooking. If her housing situation changes she is going to look into seeing if she can get Mom's Meals. Trying to increase her fluid intake. Will have constipation at times but feels this has gotten a little better since her last trip to the ED.     Physical activity - will walk instead of using the scooter at  stores.     Per Sharon Toro NP's recent note: Tolerating ozempic 1mg well. Disinterest in food, nausea/ vomiting and constipation have resolved. Appetite okay. Intake has been limited by new dental issues that have been going on for the last several weeks. She should be done now with dental procedures so discussed getting back on track with adequate protein and hydration. Stressed importance of not skipping meals, adequate protein and hydration.     November 2024: Moving in 3 weeks. Patient is her own guardian now.  Cooking your own meals. On the Ozempic 1 mg dose. Has been tolerating that dose. Has been dealing with constipation. Eating 3 small meals a day. Prioritizing protein the best she can. Doing better with her fluid intake now that she can drink from straws again. Averaging about 30 oz of water daily.     Physical activity - getting easier. Able to walk more, can get through the stores a lot easier when shopping.     Has a gym in her new apartment building.     January 2025: Patient reports she has been dealing with constipation issues. This ended up in her going to the ED multiple times in the last week. Now taking daily colace and that has been helpful.      In the last month patient moved into her own apartment. She has been struggling financially to afford groceries. Patient sent a [employer] message earlier today with a form from the [facility] that she is hoping the weight management team can fill out to help see if she can obtain some additional support for food.     Encouraged patient to check out my resources that I have included in my not for some food assistance programs in the [facility] [place].         9/12/2023    12:48 PM   Recall Diet Questions Reviewed With Patient   Describe what you typically consume for breakfast (typical or most recent) eggs and hash browns, homemade waffles, laith pudding with fruit   Describe what you typically consume for lunch (typical or most recent) homemade lunchables, some  pasta or chicken   Describe what you typically consume for supper (typical or most recent) low calorie meal prep meals   Describe what you typically consume as snacks (typical or most recent) cheese sticks, fruit jerky, fruits/vegetables   How many ounces of water, or other low calorie drinks, do you drink daily (8 oz=1 glass)? 48 oz   How many ounces of caffeine (coffee, tea, pop) do you drink daily (8 oz=1 glass)? 16 oz   How many ounces of carbonated (pop, beer, sparkling water) drinks do you drinky daily (8 oz=1 glass)? 0 oz   How many ounces of juice, pop, sweet tea, sports drinks, protein drinks, other sweetened drinks, do you drink daily (8 oz=1 glass)? 0 oz   How many ounces of milk do you drink daily (8 oz=1 glass) 0 oz   How often do you drink alcohol? Never           9/12/2023    12:48 PM   Eating Habits   What foods do you crave? ice cream, chocolate, sometimes just salty chips           9/12/2023    12:48 PM   Dining Out History Reviewed With Patient   How often do you dine out? Rarely.   Where do you dine out? (select all that apply) take out   What types of food do you order when you dine out? jitendra verdin     EXERCISE:        7/20/2024     9:33 AM   --   How often do you exercise? 1 to 2 times per week   What is the duration of your exercise (in minutes)? 15 Minutes   What types of exercise do you do? walking   What keeps you from being more active? Pain     NUTRITION DIAGNOSIS:  Obesity r/t long history of positive energy balance aeb BMI >30 kg/m2.    INTERVENTION:  Intervention Provided/Education Provided on post-op diet guidelines, vitamins/minerals essential post-operatively, GI anatomy of bariatric surgeries, ways to help prepare for post-op diet guidelines pre-operatively, portion/calorie-control, mindful eating and sources of protein.  Patient demonstrates understanding.     Personal barriers to making and continuing required life changes have been identified, and strategies to overcome those  barriers have been recommended AND family and social supports have been assessed and strategies to strengthen those supports have been recommended.    Provided pt with list of goals, RD contact information and resources listed below via NodeFly.           9/12/2023    12:48 PM   Questions Reviewed With Patient   How ready are you to make changes regarding your weight? Number 1 = Not ready at all to make changes up to 10 = very ready. 10   How confident are you that you can change? 1 = Not confident that you will be successful making changes up to 10 = very confident. 7     Expected Engagement: good    GOALS:  1) Continue to eat 3 small meals daily  2) Have a good protein source at all meals  3) Keep up with hydration, aim for 64 oz of fluid daily.     Eating Well on a Budget: https://www.fvfiles.com/723770.pdf    Cost friendly protein sources   Peanut Butter  Eggs  Edamame  Canned Tuna  Canned Vance  Greek Yogurt  Sunflower Seeds  Black Beans  Cottage Cheese  Lentils  Oats  Milk  Pumpkin Seeds  Ground Turkey  Tempeh    Hunger Solutions:   Call the [place] Food Help Line at 1-476.698.4167 to talk with a specialist in community and government food assistance programs. They can help you sign-up for SNAP benefits, find food malone, food shelves and free food in your community and help refer you to any other community assistance programs that you qualify for.   https://www.hungersolutions.org/find-help/    Supplemental Nutrition Assistance Program (SNAP):  https://[place].gov/dhs/people-we-serve/adults/economic-assistance/food-nutrition/programs-and-services/supplemental-nutrition-assistance-program.jsp    [facility] Department:  To find a map of food shelves and food distribution pop-ups. Visit www.[facility].gov/foodshelves    [employer] [place] Program: Spend $10 of EBT, get $10 free at farmers market.  To find map of farmers  markets:  https://www.hungersolutions.org/programs/market-bucks/farmersmarkets/    SpendSmart,Eat Smart ([place])  Recipe ideas that are suppose to be more affordable  Calculator that allows you to compare product prices   https://spendsmart.extension.[facility]     Fare For All (38 locations in [place]):  Provides discounted groceries. Up to 40% off retail pricing. You can preorder and  or buy in person, depending on the site.   https://fareforall.Sonopia/    [facility] [employer]:  Discounted fresh produce, dairy and lean protein. Bus travels to underserved neighborhoods. Anyone can purchase from the mobile market.   https://www.Sonopia/groceries/[facility]-ShelfX-Intrallect/    [employer] (Online)  Get organic produce and sustainably sourced groceries delivered at up to 40% off grocery store prices.  https://www.Zendrive      [facility] ([place])  Fresh Produce Distribution Events and Free Food Markets in [place].   https://[facility].org/programs/food-support/    [facility] Health and Wellness Food Programs ([facility]):  https://[facility].org/helping-our-neighbors/support-everyday-life/community-food-shelf  [facility]    Food Shelf: Monday - Thursday, 11:00 a.m. - 4:00 p.m.  o An ID is helpful but not required.  o You may visit once per calendar month -anytime during a month.  o Items include meat, dairy, bread, and other food, hygiene, cleaning supplies and more.  [employer] [place]: You may visit once per day, Monday, Tuesday, Wednesday, Thursday 9:00 a.m. to 11:00 a.m.  o Free fruit, vegetables, salads, and deli items  Nutrition Assistance Program for Seniors (NAPS or  the senior box ).  Eligibility: at least 60 years old & 130% Federal Poverty Guidelines.  To apply, call [employer] [place] [phone].  Free Fresh Food Fridays - Summer Outdoor Distribution:  Fruits & vegetables at [place], 2nd & 4th Fridays  "9:30 a.m.  May - September, rain or shine    [place] Food Shelves ([place]):  https://Ecinity.org/food-shelves/  [place] Food Shelf  [address] [address]  [phone]    [place] Food Shelf  [address] (at [facility] [place] [address]  [phone]    Foodmobile - Mobile Food Shelf  Foodmobiles travel throughout [place] and the northern [facility]als [facility] to bring nutritious food to areas of high need. See list of locations here: https://Ecinity.org/events/    [facility] Food Distribution ([facility]):  https://the[facility] HealthcareeduFireundation.org/programs/nutritional-services/  [facility], Tuesdays 3-5PM  [address], [place] [address]     [facility], Fridays 12-2PM  [address] [place] [address]     [facility] Food Distribution, Mondays 1-2PM  [address] [place] [address]    University Students:  https://aide.[facility].edu/food-pantry  -[employer] Food Pantry is open every other week during the semester. It's open on Tuesdays and Wednesdays from 12 - 5 pm and is located on the 1st floor of [facility] ([address]), across from the movie theater.   - Any student can visit the food pantry up to two times per month, and only once per week when the pantry is open.     Too Good To Go:   An pavan that lets users buy discounted \"Surprise Bags\" of unsold food from local stores, bakeries, and restaurants. The bags typically contain a variety of food/meals and help to reduce food waste while saving money.    The Mediterranean Diet:   A Mediterranean-style diet is a healthy way of eating. It includes a lot of foods from plants, such as vegetables, fruits, beans, nuts, seeds, whole grains, and olive oil. It also includes dairy foods, eggs, fish, and poultry, but in smaller amounts. Fish and poultry are eaten more often than red meat. This diet limits sugar, highly " processed foods, refined carbohydrates, and processed meats. Many studies over time have shown health benefits to eating this way. It focuses on making meals with fresh foods that are plant-based and minimally processed.    This plan of eating is inspired by how people eat in countries around the [place] Sea. They include [place], [place], [place], [place], Atkins, and [place]. But it uses foods you can buy in almost any grocery store.    Health benefits of a Mediterranean diet:  This diet is high in fiber, lean protein, and healthy oils. It s low in saturated fats and sugar. The diet has been shown to help prevent or manage:    Depression  Diabetes  Heart disease  High blood pressure  Parkinson disease  Alzheimer disease  Certain cancers  Low Inflammation     What do I eat on a Mediterranean diet?  Plan each meal around vegetables and whole grains. Use olive oil. Add nuts and legumes. Include fish or lean protein. Foods to focus your meals around include:    Vegetables: This includes leafy greens, tomatoes, squash, peppers, cucumbers, green beans, eggplant, avocados, potatoes, and olives. You can use fresh or frozen vegetables.    Fruits: This includes apples, raspberries, strawberries, grapes, citrus fruit, such as oranges and grapefruit, stone fruit, such as apricots and peaches, plus figs, dates, and melon.    Whole grains: This includes brown rice, whole oats, quinoa, millet, whole grain bread, whole-wheat pasta, and crackers made with whole grains.    Beans and legumes: These include lentils, chickpeas, and beans, such as sidhu, sae, kidney, and black beans. Peanuts are also legumes.    Nuts and seeds: These include walnuts, almonds, sunflower seeds, cashews, Brazil nuts, and pecans.    Healthy oil: Olive oil is the most common oil in the Mediterranean diet. But other healthy oils are avocado, canola, sunflower, safflower, and corn oils.    Herbs and spices: Season food with oregano, pepper, monika, tarragon,  thyme, basil, cinnamon, and cumin.    Foods to eat in smaller amounts:   Dairy foods, such as cheese, yogurt, and butter  Poultry, such as chicken and duck  Eggs  Fish and seafood such as salmon, trout, mackerel, odra, tuna, sardines, anchovies, and whitefish. It also includes shellfish such as shrimp, oysters, mussels, and clams.  The American Heart Association advises to limit or have no alcohol. Wine may be OK for certain people in low or moderate amounts, with meals. Talk with your provider about your situation and risk.  Occasional treats    Occasional treats. Limit these foods in your weekly eating plan:  Red meats, such as beef, lamb, and pork  Processed meats  Refined grains, such as white rice and foods made with white flour  Sugary treats, such as chocolate, candy, or pastries    Adding lots of flavor:  You can liven up fresh foods with many kinds of flavor. Try these sauces, dips, and seasonings:  Hummus  Marinara sauce  Salsa  Vinaigrette dressing  Lemon/lime juice  Cooking medardo    Tips for eating out:  Skip fried foods. These have a lot of saturated fat.  Look for fish dishes that are cooked without cream or butter.  Pick salads that have nuts and seeds.  Choose vegetarian choices that don t have too much cheese.    Websites for recipes and info on the Mediterranean Diet:    https://www.[facility]inic.org/healthy-lifestyle/nutrition-and-healthy-eating/in-depth/mediterranean-diet/art-93434903#:~:text=The%20foundation%20of%20the%20Mediterranean,Mediterranean%20Diet%2C%20as%20is%20seafood.    Www.oldwayspt.org    https://www.[facility].org/healthy-lifestyle/nutrition-and-healthy-eating/in-depth/mediterranean-diet-recipes/art-83581248    Follow Up: March 5.     Time spent with patient: 20 minutes.  Nevin Birmingham RD, LD        Again, thank you for allowing me to participate in the care of your patient.      Sincerely,    Nevin Birmingham RD

## 2025-01-22 NOTE — PROGRESS NOTES
"Video-Visit Details    Type of service:  Video Visit    Video Start Time: 12:57 PM  Video End Time: 1:17 PM     Originating Location (pt. Location): Home    Distant Location (provider location): Offsite (providers home) [facility] [place]     Platform used for Video Visit: [facility]    Return Nutrition Consultation Note    Reason For Visit: Nutrition Assessment    Nevin Alvarado is a 34 year old presenting today for return nutrition medical weight management consult.  Patient is accompanied by self.     Pt referred by Sharon Toro NP on September 12, 2023.  CO-MORBIDITIES OF OBESITY INCLUDE:        9/12/2023    12:48 PM   --   I have the following health issues associated with obesity Type II Diabetes    High Blood Pressure    Sleep Apnea    Polycystic Ovarian Syndrome    GERD (Reflux)    Fatty Liver    Asthma    Stress Incontinence     SUPPORT:      9/12/2023    12:48 PM   Support System Reviewed With Patient   Who do you have in your support network that can be available to help you for the first 2 weeks after surgery? I live in a group home with 24 hour staff, mom   Who can you count on for support throughout your weight loss surgery journey? a friend, and my therapist     ANTHROPOMETRICS:  Initial Consult Weight: 309 lb on 9/13/23  Estimated body mass index is 42.07 kg/m  as calculated from the following:    Height as of this encounter: 1.575 m (5' 2\").    Weight as of this encounter: 104.3 kg (230 lb).  Current Weight: 230 lb per pt report     Wt Readings from Last 5 Encounters:   01/22/25 104.3 kg (230 lb)   01/11/25 104.3 kg (230 lb)   01/07/25 104.3 kg (230 lb)   12/30/24 99.8 kg (220 lb)   12/16/24 105.7 kg (233 lb 0.4 oz)         9/12/2023    12:48 PM   --   I have tried the following methods to lose weight Watching portions or calories    Exercise    Pre packaged meals ex: Nutrisystem    OTC Medications    Prescription Medications         9/12/2023    12:48 PM "   Weight Loss Questions Reviewed With Patient   How long have you been overweight? Since late teens through early 20's     MEDICATION FOR WEIGHT LOSS:  Ozempic 1 mg     Hx of self harm- swallowing thing- started after she was treated for anorexia when taking topiramate- in 6 months has only had one day of self harm- this was 2 weeks ago and instead of swallowing she inserted something per rectum. - Followed by MASON Potter PA-C, PE in 2019 after esophageal perforation repair     VITAMIN/MINERAL SUPPLEMENTS:  Iron, Vitamin B12, Vitamin D     NUTRITION HISTORY:  Food allergies:NKFA  Food intolerances: None  Previous experience with diet modification for weight loss: Nutrisystem, prescription medications, exercise, watching calories or portions     Has been meal planning for the past 3 months. Likes chicken, turkey, shrimp.      October 2023: Eating 3 meals per day. Lunch and dinner meals have been chili or mediterranean orzo salad with artichokes more recently. Drinks mostly water, Cup of coffee, [employer]. Doesn't drink soda or juice. Uses a walker for neuropathy. Walking around more often/standing longer which has been an improvement.     November 2023: Reports she has been sick a lot recently. Sometimes feels she has to force herself to eat. Currently has a cold. Working with a GI doctor right now also as she has been having some GI symptoms after eating certain foods. Reports she had another self harm episode unfortunately, feels this will delay her chances for surgery.     January 2024: Had been dealing with illness recently which set her back a little bit on her goals. Has been using Wegovy. Feels since starting the Wegovy her face gets puffy or red blotches on it, plans to keep a close eye on it before deciding to stop. Had been eating very well but finding more temptations now. Feels she is not cooking as often and doing more frozen meals.  Hasn't been using walker for a few weeks.     April  2024: Since visiting Sharon nutrition has been off and on since she was in the hospital and multiple trips to the ED. Currently being treated for pneumonia, PE, depression and anxiety. Reports she continues to have constipation/diarrhea on and off. Has Miralax as needed. Also has enema prescription also. Nutrition has been very challenging over the last month due to being in the hospital for 15 days. She did continue to do her Wegovy injections while in the hospital. Plan is to focus on getting healthy and feeling better then will work on her good eating habits again.      June 2024: Things have been going a lot better for patient since last visit. She switched from Wegovy to Ozempic over the last month. Eating 2-3 meals a day. Bowel habits have improved since last RD visit. Physical activity has gotten a lot better with the weight loss. Does not use walker anymore. Walking around a lot. Also spoke with a diabetes educator in May.     August 2024: Psychologist recommended patient not have surgery. Patient is ok with this and is happy with her weight loss on AOMs.     Last few months have been harder due to health conditions she has been dealing with and some stress around her guardianship and housing. She is trying her best to eat healthfully but hasn't found much interest in cooking. If her housing situation changes she is going to look into seeing if she can get Mom's Meals. Trying to increase her fluid intake. Will have constipation at times but feels this has gotten a little better since her last trip to the ED.     Physical activity - will walk instead of using the scooter at stores.     Per Sharon Toro NP's recent note: Tolerating ozempic 1mg well. Disinterest in food, nausea/ vomiting and constipation have resolved. Appetite okay. Intake has been limited by new dental issues that have been going on for the last several weeks. She should be done now with dental procedures so discussed getting back on track with  adequate protein and hydration. Stressed importance of not skipping meals, adequate protein and hydration.     November 2024: Moving in 3 weeks. Patient is her own guardian now.  Cooking your own meals. On the Ozempic 1 mg dose. Has been tolerating that dose. Has been dealing with constipation. Eating 3 small meals a day. Prioritizing protein the best she can. Doing better with her fluid intake now that she can drink from straws again. Averaging about 30 oz of water daily.     Physical activity - getting easier. Able to walk more, can get through the stores a lot easier when shopping.     Has a gym in her new apartment building.     January 2025: Patient reports she has been dealing with constipation issues. This ended up in her going to the ED multiple times in the last week. Now taking daily colace and that has been helpful.      In the last month patient moved into her own apartment. She has been struggling financially to afford groceries. Patient sent a Jacked message earlier today with a form from the [facility] that she is hoping the weight management team can fill out to help see if she can obtain some additional support for food.     Encouraged patient to check out my resources that I have included in my not for some food assistance programs in the [facility].         9/12/2023    12:48 PM   Recall Diet Questions Reviewed With Patient   Describe what you typically consume for breakfast (typical or most recent) eggs and hash browns, homemade waffles, laith pudding with fruit   Describe what you typically consume for lunch (typical or most recent) homemade lunchables, some pasta or chicken   Describe what you typically consume for supper (typical or most recent) low calorie meal prep meals   Describe what you typically consume as snacks (typical or most recent) cheese sticks, fruit jerky, fruits/vegetables   How many ounces of water, or other low calorie drinks, do you drink daily (8 oz=1 glass)? 48 oz   How many  ounces of caffeine (coffee, tea, pop) do you drink daily (8 oz=1 glass)? 16 oz   How many ounces of carbonated (pop, beer, sparkling water) drinks do you drinky daily (8 oz=1 glass)? 0 oz   How many ounces of juice, pop, sweet tea, sports drinks, protein drinks, other sweetened drinks, do you drink daily (8 oz=1 glass)? 0 oz   How many ounces of milk do you drink daily (8 oz=1 glass) 0 oz   How often do you drink alcohol? Never           9/12/2023    12:48 PM   Eating Habits   What foods do you crave? ice cream, chocolate, sometimes just salty chips           9/12/2023    12:48 PM   Dining Out History Reviewed With Patient   How often do you dine out? Rarely.   Where do you dine out? (select all that apply) take out   What types of food do you order when you dine out? jitendra verdin     EXERCISE:        7/20/2024     9:33 AM   --   How often do you exercise? 1 to 2 times per week   What is the duration of your exercise (in minutes)? 15 Minutes   What types of exercise do you do? walking   What keeps you from being more active? Pain     NUTRITION DIAGNOSIS:  Obesity r/t long history of positive energy balance aeb BMI >30 kg/m2.    INTERVENTION:  Intervention Provided/Education Provided on post-op diet guidelines, vitamins/minerals essential post-operatively, GI anatomy of bariatric surgeries, ways to help prepare for post-op diet guidelines pre-operatively, portion/calorie-control, mindful eating and sources of protein.  Patient demonstrates understanding.     Personal barriers to making and continuing required life changes have been identified, and strategies to overcome those barriers have been recommended AND family and social supports have been assessed and strategies to strengthen those supports have been recommended.    Provided pt with list of goals, RD contact information and resources listed below via [employer]           9/12/2023    12:48 PM   Questions Reviewed With Patient   How ready are you to make changes  regarding your weight? Number 1 = Not ready at all to make changes up to 10 = very ready. 10   How confident are you that you can change? 1 = Not confident that you will be successful making changes up to 10 = very confident. 7     Expected Engagement: good    GOALS:  1) Continue to eat 3 small meals daily  2) Have a good protein source at all meals  3) Keep up with hydration, aim for 64 oz of fluid daily.     Eating Well on a Budget: https://www.fvfiles.com/113817.pdf    Cost friendly protein sources   Peanut Butter  Eggs  Edamame  Canned Tuna  Canned [place]  Greek Yogurt  Sunflower Seeds  Black Beans  Cottage Cheese  Lentils  Oats  Milk  Pumpkin Seeds  Ground Turkey  Tempeh    Hunger Solutions:   Call the [place] Food Help Line at [phone] to talk with a specialist in community and government food assistance programs. They can help you sign-up for SNAP benefits, find food malone, food shelves and free food in your community and help refer you to any other community assistance programs that you qualify for.   https://www.hungersolutions.org/find-help/    Supplemental Nutrition Assistance Program (SNAP):  https://[place].gov/dhs/people-we-serve/adults/economic-assistance/food-nutrition/programs-and-services/supplemental-nutrition-assistance-program.jsp    [facility] Department:  To find a map of food shelves and food distribution pop-ups. Visit www.[facility].gov/foodshelves    [employer] [place] Program: Spend $10 of EBT, get $10 free at [employer].  To find map of farmers markets:  https://www.[facility]Clickabilityions.org/programs/market-bucks/farmersOffSite VISIONets/    SpendSmart,Eat Smart ([place])  Recipe ideas that are suppose to be more affordable  Calculator that allows you to compare product prices   https://spendsmart.extension.[facility]     Fare For All (38 locations in [place]):  Provides discounted groceries. Up to 40% off retail pricing. You can preorder and  or buy in person, depending on the site.    https://fareforall.IngagePatient.org/    [facility] Mobile Market:  Discounted fresh produce, dairy and lean protein. Bus travels to underserved neighborhoods. Anyone can purchase from the mobile market.   https://www.IngagePatient.org/groceries/[facility]-mobile-market/    [employer] Market (Online)  Get organic produce and sustainably sourced groceries delivered at up to 40% off grocery store prices.  https://www.[employer]      [facility] ([place])  Fresh Produce Distribution Events and Free Food Markets in [place].   https://[facility].org/programs/food-support/    [facility] Food Programs ([facility]):  https://[facility] HospitalWeGoOut.[facility]/helping-our-neighbors/support-everyday-life/community-food-shelf  [address] [facility]    Food Shelf: Monday - Thursday, 11:00 a.m. - 4:00 p.m.  o An ID is helpful but not required.  o You may visit once per calendar month -anytime during a month.  o Items include meat, dairy, bread, and other food, hygiene, cleaning supplies and more.  [employer] [place]: You may visit once per day, Monday, Tuesday, Wednesday, Thursday 9:00 a.m. to 11:00 a.m.  o Free fruit, vegetables, salads, and deli items  Nutrition Assistance Program for Seniors (NAPS or  the senior box ).  Eligibility: at least 60 years old & 130% Federal Poverty Guidelines.  To apply, call [employer] [place] [phone].  Free Fresh Food Fridays - Summer Outdoor Distribution:  Fruits & vegetables at [place], 2nd & 4th Fridays 9:30 a.m.  May - September, rain or shine    [place] Food Shelves ([place]):  https://weipass.org/food-shelves/  [place] Food Shelf  [address] (near [facility])[place] [address]  [phone]    [address] Food Shelf  [address] (at [facility] [place] [address]  [phone]    Foodmobile - Mobile Food Shelf  Foodmobiles travel throughout [place] and the [facility]  "of [facility] to bring nutritious food to areas of high need. See list of locations here: https://[employer].org/events/    [facility] Food Distribution ([facility]):  https://[facility].org/programs/nutritional-services/  [facility], Tuesdays 3-5PM  [address], [place] [address]     [facility], Fridays 12-2PM  [address] [place] [address]     [facility] Food Distribution, Mondays 1-2PM  [address] [facility] [facility] [place] [address]    University Students:  https://aide.[facility]/food-pantry  -[employer] Food Pantry is open every other week during the semester. It's open on Tuesdays and Wednesdays from 12 - 5 pm and is located on the 1st floor of [facility] ([address]), across from the movie theater.   - Any student can visit the food pantry up to two times per month, and only once per week when the pantry is open.     Too Good To Go:   An pavan that lets users buy discounted \"Surprise Bags\" of unsold food from local stores, bakeries, and restaurants. The bags typically contain a variety of food/meals and help to reduce food waste while saving money.    The Mediterranean Diet:   A Mediterranean-style diet is a healthy way of eating. It includes a lot of foods from plants, such as vegetables, fruits, beans, nuts, seeds, whole grains, and olive oil. It also includes dairy foods, eggs, fish, and poultry, but in smaller amounts. Fish and poultry are eaten more often than red meat. This diet limits sugar, highly processed foods, refined carbohydrates, and processed meats. Many studies over time have shown health benefits to eating this way. It focuses on making meals with fresh foods that are plant-based and minimally processed.    This plan of eating is inspired by how people eat in countries around the [place] Sea. They include [place], [place], [place], [place], [place], and [place]. But it uses foods you can buy in almost any grocery " store.    Health benefits of a Mediterranean diet:  This diet is high in fiber, lean protein, and healthy oils. It s low in saturated fats and sugar. The diet has been shown to help prevent or manage:    Depression  Diabetes  Heart disease  High blood pressure  Parkinson disease  Alzheimer disease  Certain cancers  Low Inflammation     What do I eat on a Mediterranean diet?  Plan each meal around vegetables and whole grains. Use olive oil. Add nuts and legumes. Include fish or lean protein. Foods to focus your meals around include:    Vegetables: This includes leafy greens, tomatoes, squash, peppers, cucumbers, green beans, eggplant, avocados, potatoes, and olives. You can use fresh or frozen vegetables.    Fruits: This includes apples, raspberries, strawberries, grapes, citrus fruit, such as oranges and grapefruit, stone fruit, such as apricots and peaches, plus figs, dates, and melon.    Whole grains: This includes brown rice, whole oats, quinoa, millet, whole grain bread, whole-wheat pasta, and crackers made with whole grains.    Beans and legumes: These include lentils, chickpeas, and beans, such as sidhu, sae, kidney, and black beans. Peanuts are also legumes.    Nuts and seeds: These include walnuts, almonds, sunflower seeds, cashews, Brazil nuts, and pecans.    Healthy oil: Olive oil is the most common oil in the Mediterranean diet. But other healthy oils are avocado, canola, sunflower, safflower, and corn oils.    Herbs and spices: Season food with oregano, pepper, monika, tarragon, thyme, basil, cinnamon, and cumin.    Foods to eat in smaller amounts:   Dairy foods, such as cheese, yogurt, and butter  Poultry, such as chicken and duck  Eggs  Fish and seafood such as salmon, trout, mackerel, dora, tuna, sardines, anchovies, and whitefish. It also includes shellfish such as shrimp, oysters, mussels, and clams.  The American Heart Association advises to limit or have no alcohol. Wine may be OK for certain  people in low or moderate amounts, with meals. Talk with your provider about your situation and risk.  Occasional treats    Occasional treats. Limit these foods in your weekly eating plan:  Red meats, such as beef, lamb, and pork  Processed meats  Refined grains, such as white rice and foods made with white flour  Sugary treats, such as chocolate, candy, or pastries    Adding lots of flavor:  You can liven up fresh foods with many kinds of flavor. Try these sauces, dips, and seasonings:  Hummus  Marinara sauce  Salsa  Vinaigrette dressing  Lemon/lime juice  Cooking medardo    Tips for eating out:  Skip fried foods. These have a lot of saturated fat.  Look for fish dishes that are cooked without cream or butter.  Pick salads that have nuts and seeds.  Choose vegetarian choices that don t have too much cheese.    Websites for recipes and info on the Mediterranean Diet:    https://www.mayoclinic.org/healthy-lifestyle/nutrition-and-healthy-eating/in-depth/mediterranean-diet/art-78377180#:~:text=The%20foundation%20of%20the%20Mediterranean,Mediterranean%20Diet%2C%20as%20is%20seafood.    Www.oldwayspt.org    https://www.[facility].org/healthy-lifestyle/nutrition-and-healthy-eating/in-depth/mediterranean-diet-recipes/art-20278488    Follow Up: March 5.     Time spent with patient: 20 minutes.  Nevin Birmingham RD, LD

## 2025-01-22 NOTE — PATIENT INSTRUCTIONS
Jay Rooney,     Follow-up with RD on March 5.     Thank you,    Nevin Birmingham, SIDNEY, LD  If you would like to schedule or reschedule an appointment with the RD, please call [phone]    Nutrition Goals  Eating Well on a Budget: [email or web address].[email or web address]    Cost friendly protein sources   Peanut Butter  Eggs  Edamame  Canned Tuna  Canned [place]  Greek Yogurt  Sunflower Seeds  Black Beans  Cottage Cheese  Lentils  Oats  Milk  Pumpkin Seeds  Ground Turkey  Tempeh    Hunger Solutions:   Call the [place] Food Help Line at [phone] to talk with a specialist in community and government food assistance programs. They can help you sign-up for SNAP benefits, find food malone, food shelves and free food in your community and help refer you to any other community assistance programs that you qualify for.   https://www.hungersolutions.org/find-help/    Supplemental Nutrition Assistance Program (SNAP):  [place]://[place].gov/dhs/people-we-serve/adults/economic-assistance/food-nutrition/programs-and-services/supplemental-nutrition-assistance-program.jsp    [facility] Department:  To find a map of food shelves and food distribution pop-ups. Visit www.[facility].gov/foodshelves    [employer] [place] Program: Spend $10 of EBT, get $10 free at farmers market.  To find map of farmers markets:  https://www.hungersolutions.org/programs/market-bucks/farmersmarkets/    SpendSmart,Eat Smart ([place])  Recipe ideas that are suppose to be more affordable  Calculator that allows you to compare product prices   https://spendsmart.extension.[facility]     Fare For All (38 locations in [place]):  Provides discounted groceries. Up to 40% off retail pricing. You can preorder and  or buy in person, depending on the site.   https://fareforall.thefoodgroupmn.org/    [facility] Mobile Market:  Discounted fresh produce, dairy and lean protein. Bus travels to underserved neighborhoods. Anyone can purchase from the mobile market.    https://www.thefoodgroupmn.org/groceries/[facility]-mobile-market/    [employer] Market (Online)  Get organic produce and sustainably sourced groceries delivered at up to 40% off grocery store prices.  https://www.[employer]      [facility] ([place])  Fresh Produce Distribution Events and Free Food Markets in [place].   https://[facility].org/programs/food-support/    [facility] Health and Wellness Food Programs ([facility]):  https://[facility].org/helping-our-neighbors/support-everyday-life/community-food-shelf  [address]    Food Shelf: Monday - Thursday, 11:00 a.m. - 4:00 p.m.  o An ID is helpful but not required.  o You may visit once per calendar month -anytime during a month.  o Items include meat, dairy, bread, and other food, hygiene, cleaning supplies and more.  [employer] [place]: You may visit once per day, Monday, Tuesday, Wednesday, Thursday 9:00 a.m. to 11:00 a.m.  o Free fruit, vegetables, salads, and deli items  Nutrition Assistance Program for Seniors (NAPS or  the senior box ).  Eligibility: at least 60 years old & 130% Federal Poverty Guidelines.  To apply, call [employer] [phone].  Free Fresh Food Fridays - Summer Outdoor Distribution:  Fruits & vegetables at [place], 2nd & 4th Fridays 9:30 a.m.  May - September, rain or shine    [place] Food Shelves ([place]):  https://[employer].org/food-shelves/  [place] Food Shelf  [address]near [address] [place], [place] [id number] [phone]    [facility] Food Shelf  [address] ([address] [place], [place] [id number] [phone]    Foodmobile - Mobile Food Shelf  Foodmobiles travel throughout [place] and the northern [facility] to bring nutritious food to areas of high need. See list of locations here: https://keystoneservices.org/events/    [facility] - Community Food Distribution ([facility].  "[place]):  https://theSt. [facility]'s Hospitalfoundation.org/programs/nutritional-services/  [facility], Tuesdays 3-5PM  [address], [place] [address]     [facility], Fridays 12-2PM  [address] [place] [address]     [facility] Food Distribution, Mondays 1-2PM  [address] [place] [address]    University Students:  https://aide.[facility]/food-pantry  -[employer] is open every other week during the semester. It's open on Tuesdays and Wednesdays from 12 - 5 pm and is located on the 1st floor of [facility] ([address]), across from the movie theater.   - Any student can visit the food pantry up to two times per month, and only once per week when the pantry is open.     Too Good To Go:   An pavan that lets users buy discounted \"Surprise Bags\" of unsold food from local stores, bakeries, and restaurants. The bags typically contain a variety of food/meals and help to reduce food waste while saving money.    The Mediterranean Diet:   A Mediterranean-style diet is a healthy way of eating. It includes a lot of foods from plants, such as vegetables, fruits, beans, nuts, seeds, whole grains, and olive oil. It also includes dairy foods, eggs, fish, and poultry, but in smaller amounts. Fish and poultry are eaten more often than red meat. This diet limits sugar, highly processed foods, refined carbohydrates, and processed meats. Many studies over time have shown health benefits to eating this way. It focuses on making meals with fresh foods that are plant-based and minimally processed.    This plan of eating is inspired by how people eat in countries around the [place] Sea. They include [place], [place], [place], [place], [place], and [place]. But it uses foods you can buy in almost any grocery store.    Health benefits of a Mediterranean diet:  This diet is high in fiber, lean protein, and healthy oils. It s low in saturated fats and sugar. The diet has been shown to help prevent or " manage:    Depression  Diabetes  Heart disease  High blood pressure  Parkinson disease  Alzheimer disease  Certain cancers  Low Inflammation     What do I eat on a Mediterranean diet?  Plan each meal around vegetables and whole grains. Use olive oil. Add nuts and legumes. Include fish or lean protein. Foods to focus your meals around include:    Vegetables: This includes leafy greens, tomatoes, squash, peppers, cucumbers, green beans, eggplant, avocados, potatoes, and olives. You can use fresh or frozen vegetables.    Fruits: This includes apples, raspberries, strawberries, grapes, citrus fruit, such as oranges and grapefruit, stone fruit, such as apricots and peaches, plus figs, dates, and melon.    Whole grains: This includes brown rice, whole oats, quinoa, millet, whole grain bread, whole-wheat pasta, and crackers made with whole grains.    Beans and legumes: These include lentils, chickpeas, and beans, such as sidhu, sae, kidney, and black beans. Peanuts are also legumes.    Nuts and seeds: These include walnuts, almonds, sunflower seeds, cashews, Brazil nuts, and pecans.    Healthy oil: Olive oil is the most common oil in the Mediterranean diet. But other healthy oils are avocado, canola, sunflower, safflower, and corn oils.    Herbs and spices: Season food with oregano, pepper, monika, tarragon, thyme, basil, cinnamon, and cumin.    Foods to eat in smaller amounts:   Dairy foods, such as cheese, yogurt, and butter  Poultry, such as chicken and duck  Eggs  Fish and seafood such as salmon, trout, mackerel, dora, tuna, sardines, anchovies, and whitefish. It also includes shellfish such as shrimp, oysters, mussels, and clams.  The American Heart Association advises to limit or have no alcohol. Wine may be OK for certain people in low or moderate amounts, with meals. Talk with your provider about your situation and risk.  Occasional treats    Occasional treats. Limit these foods in your weekly eating plan:  Red  meats, such as beef, lamb, and pork  Processed meats  Refined grains, such as white rice and foods made with white flour  Sugary treats, such as chocolate, candy, or pastries    Adding lots of flavor:  You can liven up fresh foods with many kinds of flavor. Try these sauces, dips, and seasonings:  Hummus  Marinara sauce  Salsa  Vinaigrette dressing  Lemon/lime juice  Cooking medardo    Tips for eating out:  Skip fried foods. These have a lot of saturated fat.  Look for fish dishes that are cooked without cream or butter.  Pick salads that have nuts and seeds.  Choose vegetarian choices that don t have too much cheese.    Websites for recipes and info on the Mediterranean Diet:    https://www.[employer].org/healthy-lifestyle/nutrition-and-healthy-eating/in-depth/mediterranean-diet/art-82851203#:~:text=The%20foundation%20of%20the%20Mediterranean,Mediterranean%20Diet%2C%20as%20is%20seafood.    Www.oldNutraspacept.org    https://www.[employer].org/healthy-lifestyle/nutrition-and-healthy-eating/in-depth/mediterranean-diet-recipes/art-30317371    COMPREHENSIVE WEIGHT MANAGEMENT PROGRAM  VIRTUAL SUPPORT GROUPS    At [facility], our Comprehensive Weight Management program offers on-line support groups for patients who are working on weight loss and considering, preparing for, or have had weight loss surgery.     There is no cost for this opportunity.  You are invited to attend the?Virtual Support Groups?provided by any of the following locations:    [facility] via Vidapp Teams with Roxanna Alonso RD, RN  2.   [place] via Vidapp Teams with Bird Franz, PhD, LP  3.   [place] via Vidapp Teams with Margie Stock RN  4.   [facility] via a Zoom Meeting with ARACELI San    The following Support Group information can also be found on our website:  https://www.[facility].org/treatments/weight-loss-and-weight-loss-surgery-support-groups      [facility]   WEIGHT LOSS SURGERY  SUPPORT GROUP  The support group is a patient-lead forum that meets monthly to share experiences, encouragement and education. It is open to those who have had weight loss surgery, are scheduled for surgery, or are considering surgery.   WHEN: 3rd Wednesday of each month from 5:00PM - 6:00PM using Microsoft Teams.   FACILITATOR: Led by Roxanna Greenberg RD, BLADE, RN, the program's Clinical Coordinator.   TO REGISTER: Please contact the clinic via Prezacor or call the nurse line directly at [phone] to inform our staff that you would like an invite sent to you and to let us know the email you would like the invite sent to. Prior to the meeting, a link with directions on how to join the meeting will be sent to you.    2025 Meetings, 3rd Wednesday, 5:00pm - 6:00pm    January 15: Let's Talk  February19: Let's Talk  March 19: Speaker: Royce Agrawal RD, BLADE  April 16: Let's Talk  May 21: Speaker: Carol Thorne RD, BLADE  June18: Let's Talk  July 16: Let's Talk  August 20: Let's Talk  September 17: No meeting.  October 15: Speaker: Rhea Van PsyD, LP  November 19: Let's Talk  December 17: Let's Talk    [facility] BARIATRIC CARE SUPPORT GROUP  This is open to all pre- and post- operative bariatric surgery patients as well as their support system.   WHEN: 3rd Tuesday of each month from 6:30 PM - 8:00 PM using Microsoft Teams.   FACILITATOR: Led by Bird Franz, Ph.D who is a Licensed Psychologist with the [facility] Comprehensive Weight Management Program.   TO REGISTER: Please send an email to Bird Franz, Ph.D., LP at?[email or web address]?if you would like an invitation to the group. Prior to the meeting, a link with directions on how to join the meeting will be sent to you.    2025 Meetings, 3rd Tuesday January 21st: Open Forum  February 18th: Medications and Bariatric Surgery  Speaker: Estrella Plunkett, [place] Resident  March 18th: Open  Forum  April 15th: Genetics of Obesity as well as Q&A, Speaker: Diana Main MD, [facility] Comprehensive Weight Management Program.  May 20th: Open Forum  June 17th: Nutritional Labeling, Speaker: NAVEEN  July 15th: Open Forum  August 19th: Open Forum  September 16th: Open Forum  October 21st: Open Forum  November 18th: Holiday Eating, Speaker: NAVEEN  December 16th: Open Forum    [facility] POST-OPERATIVE BARIATRIC SURGERY SUPPORT GROUP  This is a support group for [facility] bariatric patients (and those external to [facility]) who have had bariatric surgery and are at least 3 months post-surgery.  WHEN: 4th Thursday of the month from 11:00 AM - 12:00 PM using Microsoft Teams.   FACILITATOR: Led by Certified Bariatric Nurse, Margie Stock RN, CBN.   TO REGISTER: Please send an email to Margie at [email or web address] if you would like an invitation to the group.  Prior to the meeting, a link with directions on how to join the meeting will be sent to you.    2025 Meetings, 4th Thursday, 11:00am - 12:pm  January 23  February 27  March 27  April 24  May 22  Candice 26  July 24  August 28  September 25  October 23  November 27: Thanksgiving Day, No meeting.      December 25: Tiffanie Day, No meeting.        [facility]   HEALTHY LIFESTYLE COACHING GROUP  This is a 60 minute virtual coaching group for those who want to lead a healthier lifestyle. Come together to set goals and overcome barriers in a supportive group environment. We will address the four pillars of health: nutrition, exercise, sleep, and emotional well-being.   WHEN: 1st Friday of the month, 12:30 PM - 1:30 PM   using a Zoom meeting.     FACILITATOR: Led by National Board-Certified Health and , [facility].  TO REGISTER: Please call the [place] at [phone] to register. You will get an  appointment to attend in [employer]. Fifteen minutes prior to the meeting, complete the e-check in and you will get the link to join the meeting.  There is no charge to attend this group and space is limited.  Please register for each month you wish to attend    2025 Meetings, 1st Friday, 12:30pm-1:30pm  January 3  February 7  March 7: No meeting.  April 4  May 2  Candice 6  July 4: Fourth of July Holiday, No meeting.    August 1  September 5  October 3  November 7  December 5

## 2025-02-18 ENCOUNTER — TELEPHONE (OUTPATIENT)
Dept: ENDOCRINOLOGY | Facility: CLINIC | Age: 35
End: 2025-02-18

## 2025-02-18 ENCOUNTER — VIRTUAL VISIT (OUTPATIENT)
Dept: ENDOCRINOLOGY | Facility: CLINIC | Age: 35
End: 2025-02-18
Attending: NURSE PRACTITIONER
Payer: COMMERCIAL

## 2025-02-18 VITALS — WEIGHT: 228 LBS | HEIGHT: 62 IN | BODY MASS INDEX: 41.96 KG/M2

## 2025-02-18 DIAGNOSIS — E66.813 CLASS 3 SEVERE OBESITY WITH SERIOUS COMORBIDITY AND BODY MASS INDEX (BMI) OF 50.0 TO 59.9 IN ADULT, UNSPECIFIED OBESITY TYPE (H): Primary | ICD-10-CM

## 2025-02-18 DIAGNOSIS — E11.9 TYPE 2 DIABETES MELLITUS WITHOUT COMPLICATION, WITHOUT LONG-TERM CURRENT USE OF INSULIN (H): ICD-10-CM

## 2025-02-18 DIAGNOSIS — E66.01 CLASS 3 SEVERE OBESITY WITH SERIOUS COMORBIDITY AND BODY MASS INDEX (BMI) OF 50.0 TO 59.9 IN ADULT, UNSPECIFIED OBESITY TYPE (H): Primary | ICD-10-CM

## 2025-02-18 RX ORDER — PRENATAL VIT/IRON FUM/FOLIC AC 27MG-0.8MG
1 TABLET ORAL DAILY
Qty: 90 TABLET | Refills: 3 | Status: SHIPPED | OUTPATIENT
Start: 2025-02-18

## 2025-02-18 RX ORDER — TIRZEPATIDE 5 MG/.5ML
5 INJECTION, SOLUTION SUBCUTANEOUS
Qty: 2 ML | Refills: 2 | Status: SHIPPED | OUTPATIENT
Start: 2025-02-18

## 2025-02-18 ASSESSMENT — PATIENT HEALTH QUESTIONNAIRE - PHQ9: SUM OF ALL RESPONSES TO PHQ QUESTIONS 1-9: 9

## 2025-02-18 ASSESSMENT — PAIN SCALES - GENERAL: PAINLEVEL_OUTOF10: MODERATE PAIN (5)

## 2025-02-18 NOTE — PROGRESS NOTES
Virtual Visit Details    Type of service:  Video Visit   Video Start Time:  1130  Video End Time: 1202    Originating Location (pt. Location): Home    Distant Location (provider location):  On-site  Platform used for Video Visit: MarleenWell

## 2025-02-18 NOTE — PROGRESS NOTES
Return Medical Weight Management Note     Nevin Alvarado  MRN:  [id number]  :  1990  MOR:  2025    Dear Latonya Knight MD,    I had the pleasure of seeing your patient Nevin Alvarado. She is a 34 year old female who I am continuing to see for treatment of obesity related to:        2023    12:48 PM   --   I have the following health issues associated with obesity Type II Diabetes    High Blood Pressure    Sleep Apnea    Polycystic Ovarian Syndrome    GERD (Reflux)    Fatty Liver    Asthma    Stress Incontinence       Assessment & Plan   Problem List Items Addressed This Visit          Digestive    Class 3 severe obesity with serious comorbidity and body mass index (BMI) of 50.0 to 59.9 in adult, unspecified obesity type (H) - Primary     Constipation has become more severe. Went 2.5weeks without BM. Lots of nausea/ vomiting, bile reflux. Seen by [facility] who suggested follow up to discuss ozempic. Has had constipation since starting ozempic but previously constipation was described as manageable and mild. Could consider switching to mounjaro if covered by insurance. This could also be beneficial with the increased cravings/ snacking and weight stall since increased stress.     Wondering about referral to [facility] for medication review. Referral placed. She may be moving care to [facility] so she could do MTM there too.     Living on own now. Needing to budget. EBT diet form filled out last week to help with high protein diet recommendations.     Consider adding multivitamin   Consider switching to mounjaro 5mg   Consider trying protein powder if covered by EBT   Consdier seeing MTM pharmacist for medication review   Follow up 3 months          Relevant Medications    Prenatal Vit-Fe Fumarate-FA (PRENATAL MULTIVITAMIN W/IRON) 27-0.8 MG tablet    MOUNJARO 5 MG/0.5ML SOAJ    Other Relevant Orders    Med Therapy Management Referral       Endocrine    Type 2 diabetes mellitus without  complication, without long-term current use of insulin (H)    Relevant Medications    Prenatal Vit-Fe Fumarate-FA (PRENATAL MULTIVITAMIN W/IRON) 27-0.8 MG tablet    MOUNJARO 5 MG/0.5ML SOAJ    Other Relevant Orders    Med Therapy Management Referral          INTERVAL HISTORY:  NBS 9/2023 BMI 56.5 with DMII, ZOHRA, GERD, and hx of PE. Mental health struggles contributing to weight gain including weight gain associated with medications. This was complicated by numerous episodes of swallowing non food items which has lead to perforations of the esophagus and strictures for which she is followed by GI. Had been stable for several months prior to a relapse just before our consult. We discussed mwm to start given increased risks after bariatric surgery to the stomach. Initially switched rybelsus for DMII to wegovy for better efficacy. Due to insurance change she is now taking ozempic.    last seen 7/2024 - reduced ozempic to 1mg due to disinterest in food      Has been having dental issues that are not healing well. Had surgery 1 week ago.   Rolled ankle and broke ankle 4 weeks ago     Was considering hysterectomy/ exp lap for endometriosis in Feb but then that OB left the system      Anti-obesity medication history    Current:   Ozempic 1mg (2mg)  -constipation worse recently - also taking iron - saw [facility] - going to do colonoscopy   -miralax wasn't helping any more  -currently taking colace and magnesium, senna as needed   -some nausea in the AM     Past/Failed/contraindicated:   Failed metformin, rybelsus      Recent diet changes:   More stress eating/ snacking recently   More cravings for eating out at fast food   Working on increasing protein now that she got the financial support for increasing protein   Recent stressors:   Anxiety worse   Lots of ED visits     Vitamins/Labs: 2/2025- reviewed in care everywhere  Some vitamin def. Would benefit from multivitamin     Rash-   Yeast like rash in skin fold of abdominal  venkata     CURRENT WEIGHT:   228 lbs 0 oz    Initial Weight (lbs): 309 lbs  Last Visits Weight: 104.3 kg (230 lb)  Cumulative weight loss (lbs): 81  Weight Loss Percentage: 26.21%        2/18/2025    11:09 AM   Changes and Difficulties   I have made the following changes to my diet since my last visit: Higher protein and anti inflammatory   With regards to my diet, I am still struggling with: Finding key ingredients to the foods that are in my diet within a reasonable price within my budget   I have made the following changes to my activity/exercise since my last visit: Walking more   With regards to my activity/exercise, I am still struggling with: Feeling comfortable using gym equipment in front of others         MEDICATIONS:   Current Outpatient Medications   Medication Sig Dispense Refill    MOUNJARO 5 MG/0.5ML SOAJ Inject 0.5 mLs (5 mg) subcutaneously every 7 days. 2 mL 2    Prenatal Vit-Fe Fumarate-FA (PRENATAL MULTIVITAMIN W/IRON) 27-0.8 MG tablet Take 1 tablet by mouth daily. 90 tablet 3    albuterol (PROAIR HFA/PROVENTIL HFA/VENTOLIN HFA) 108 (90 Base) MCG/ACT inhaler Inhale 2 puffs into the lungs every 6 hours as needed for shortness of breath / dyspnea or wheezing 18 g 0    albuterol (PROVENTIL) (2.5 MG/3ML) 0.083% neb solution Take 1 vial (2.5 mg) by nebulization every 6 hours as needed for shortness of breath or wheezing 90 mL 0    amitriptyline (ELAVIL) 25 MG tablet Take 35 mg by mouth at bedtime. 10 mg plus 25 mg = 35 mg      apixaban ANTICOAGULANT (ELIQUIS) 5 MG tablet Take 5 mg by mouth 2 times daily.      cetirizine (ZYRTEC) 10 MG tablet Take 1 tablet (10 mg) by mouth daily 30 tablet 0    Cholecalciferol (D3 HIGH POTENCY) 25 MCG (1000 UT) CAPS Take 50 mcg by mouth daily      clonazePAM (KLONOPIN) 0.5 MG tablet Take 1 tablet (0.5 mg) by mouth daily as needed for anxiety 7 tablet 0    cyanocobalamin (VITAMIN B-12) 1000 MCG tablet Take 1,000 mcg by mouth daily.      ferrous sulfate (FEROSUL) 325 (65  Fe) MG tablet Take 1 tablet (325 mg) by mouth daily (with breakfast) 30 tablet 0    hydrOXYzine HCl (ATARAX) 25 MG tablet Take 25 mg by mouth every 8 hours as needed for anxiety.      norethindrone (AYGESTIN) 5 MG tablet Take 5 mg by mouth daily      polyethylene glycol (MIRALAX) 17 GM/Dose powder Take 17 g by mouth daily as needed for constipation      pregabalin (LYRICA) 100 MG capsule Take 100 mg by mouth 3 times daily. One table in the morning and two tablets at night      PSYLLIUM PO Take 1 tsp by mouth daily as needed (as needed).      Semaglutide, 1 MG/DOSE, (OZEMPIC) 4 MG/3ML pen Inject 1 mg subcutaneously every 7 days. 9 mL 1    Sodium Phosphates (FLEET ENEMA) ENEM Place 1 enema rectally as needed (as needed).      tiZANidine (ZANAFLEX) 4 MG tablet Take 4 mg by mouth at bedtime.             2/18/2025    11:09 AM   Weight Loss Medication History Reviewed With Patient   Which weight loss medications are you currently taking on a regular basis? Ozempic   If you are not taking a weight loss medication that was prescribed to you, please indicate why: Other   Are you having any side effects from the weight loss medication that we have prescribed you? Yes   If you are having side effects please describe: Severe constipation       Admission on 01/14/2025, Discharged on 01/14/2025   Component Date Value Ref Range Status    Troponin T, High Sensitivity 01/14/2025 10  <=14 ng/L Final    Either a High Sensitivity Troponin T baseline (0 hours) value = 100 ng/L, or an increase in High Sensitivity Troponin T = 7 ng/L at 2 hours compared to 0 hours (2-0 hours), suggests myocardial injury, and urgent clinical attention is required.    If the 2-0 hours increase is <7 ng/L, a High Sensitivity Troponin T result above gender-specific reference ranges warrants further evaluation.   Recommendations for further evaluation include correlation with clinical decision-making tool (e.g., HEART), a 3rd High Sensitivity Troponin T test 2  hours after the 2nd (a 20% change from baseline would represent concern), admission for observation, close PCC/cardiology follow-up, or urgent outpatient provocative testing.    Lactic Acid 01/14/2025 1.2  0.7 - 2.0 mmol/L Final    Ventricular Rate 01/14/2025 77  BPM Final    Atrial Rate 01/14/2025 77  BPM Final    NV Interval 01/14/2025 148  ms Final    QRS Duration 01/14/2025 76  ms Final    QT 01/14/2025 396  ms Final    QTc 01/14/2025 448  ms Final    P Axis 01/14/2025 39  degrees Final    R AXIS 01/14/2025 60  degrees Final    T Dewitt 01/14/2025 8  degrees Final    Interpretation ECG 01/14/2025    Final                    Value:Sinus rhythm  Normal ECG  When compared with ECG of 11-Jan-2025 05:37,  No significant change was found  Unconfirmed report - interpretation of this ECG is computer generated - see medical record for final interpretation  Confirmed by - EMERGENCY ROOM, PHYSICIAN ([id number]),  Carlton Nichole ([id number]) on 1/14/2025 8:33:39 AM      Color Urine 01/14/2025 Yellow  Colorless, Straw, Light Yellow, Yellow Final    Appearance Urine 01/14/2025 Slightly Cloudy (A)  Clear Final    Glucose Urine 01/14/2025 30 (A)  Negative mg/dL Final    Bilirubin Urine 01/14/2025 Negative  Negative Final    Ketones Urine 01/14/2025 20 (A)  Negative mg/dL Final    Specific Gravity Urine 01/14/2025 1.030  1.003 - 1.035 Final    Blood Urine 01/14/2025 Small (A)  Negative Final    pH Urine 01/14/2025 5.5  5.0 - 7.0 Final    Protein Albumin Urine 01/14/2025 70 (A)  Negative mg/dL Final    Urobilinogen Urine 01/14/2025 2.0  Normal, 2.0 mg/dL Final    Nitrite Urine 01/14/2025 Negative  Negative Final    Leukocyte Esterase Urine 01/14/2025 Negative  Negative Final    Bacteria Urine 01/14/2025 Moderate (A)  None Seen /HPF Final    Mucus Urine 01/14/2025 Present (A)  None Seen /LPF Final    RBC Urine 01/14/2025 5 (H)  <=2 /HPF Final    WBC Urine 01/14/2025 25 (H)  <=5 /HPF Final    Squamous Epithelials Urine 01/14/2025 3 (H)   "<=1 /HPF Final    Hold Specimen 01/14/2025 JI   Final    Hold Specimen 01/14/2025 JI   Final    Hold Specimen 01/14/2025 [facility]   Final    Culture 01/14/2025 <10,000 CFU/mL Mixture of Urogenital [place]   Final    Color Urine 01/14/2025 Yellow  Colorless, Straw, Light Yellow, Yellow Final    Appearance Urine 01/14/2025 Slightly Cloudy (A)  Clear Final    Glucose Urine 01/14/2025 Negative  Negative mg/dL Final    Bilirubin Urine 01/14/2025 Negative  Negative Final    Ketones Urine 01/14/2025 40 (A)  Negative mg/dL Final    Specific Gravity Urine 01/14/2025 1.027  1.003 - 1.035 Final    Blood Urine 01/14/2025 Small (A)  Negative Final    pH Urine 01/14/2025 6.0  5.0 - 7.0 Final    Protein Albumin Urine 01/14/2025 30 (A)  Negative mg/dL Final    Urobilinogen Urine 01/14/2025 Normal  Normal, 2.0 mg/dL Final    Nitrite Urine 01/14/2025 Negative  Negative Final    Leukocyte Esterase Urine 01/14/2025 Negative  Negative Final    Bacteria Urine 01/14/2025 Moderate (A)  None Seen /HPF Final    Mucus Urine 01/14/2025 Present (A)  None Seen /LPF Final    RBC Urine 01/14/2025 3 (H)  <=2 /HPF Final    WBC Urine 01/14/2025 21 (H)  <=5 /HPF Final    Squamous Epithelials Urine 01/14/2025 3 (H)  <=1 /HPF Final           9/13/2023    10:26 AM   BRIAN Score (Last Two)   BRIAN Raw Score 37   Activation Score 79.2   BRIAN Level 4         PHYSICAL EXAM:  Objective    Ht 1.575 m (5' 2.01\")   Wt 103.4 kg (228 lb)   BMI 41.69 kg/m      Vitals - Patient Reported  Pain Score: Moderate Pain (5)  Pain Loc: Other - see comment (all over body pain)        GENERAL: alert and no distress  EYES: Eyes grossly normal to inspection.  No discharge or erythema, or obvious scleral/conjunctival abnormalities.  RESP: No audible wheeze, cough, or visible cyanosis.    SKIN: Visible skin clear. No significant rash, abnormal pigmentation or lesions.  NEURO: Cranial nerves grossly intact.  Mentation and speech appropriate for age.  PSYCH: Appropriate affect, tone, and " pace of words        Sincerely,    Sharon Toro, NP      32 minutes spent by me on the date of the encounter doing chart review, history and exam, documentation and further activities per the note    The longitudinal plan of care for the diagnosis(es)/condition(s) as documented were addressed during this visit. Due to the added complexity in care, I will continue to support Nevin in the subsequent management and with ongoing continuity of care.

## 2025-02-18 NOTE — LETTER
2025       RE: Nevin Alvarado  [address]  [facility] [address]     Dear Colleague,    Thank you for referring your patient, Nevin Alvarado, to the [facility]. Please see a copy of my visit note below.      Return Medical Weight Management Note     Nevin Alvarado  MRN:  [id number]  :  1990  MOR:  2025    Dear Latonya Knight MD,    I had the pleasure of seeing your patient Nevin Alvarado. She is a 34 year old female who I am continuing to see for treatment of obesity related to:        2023    12:48 PM   --   I have the following health issues associated with obesity Type II Diabetes    High Blood Pressure    Sleep Apnea    Polycystic Ovarian Syndrome    GERD (Reflux)    Fatty Liver    Asthma    Stress Incontinence       Assessment & Plan  Problem List Items Addressed This Visit          Digestive    Class 3 severe obesity with serious comorbidity and body mass index (BMI) of 50.0 to 59.9 in adult, unspecified obesity type (H) - Primary     Constipation has become more severe. Went 2.5weeks without BM. Lots of nausea/ vomiting, bile reflux. Seen by [facility] who suggested follow up to discuss ozempic. Has had constipation since starting ozempic but previously constipation was described as manageable and mild. Could consider switching to mounjaro if covered by insurance. This could also be beneficial with the increased cravings/ snacking and weight stall since increased stress.     Wondering about referral to MTM for medication review. Referral placed. She may be moving care to [facility] so she could do MTM there too.     Living on own now. Needing to budget. EBT diet form filled out last week to help with high protein diet recommendations.     Consider adding multivitamin   Consider switching to mounjaro 5mg   Consider trying protein powder if covered by  Patient on 3 year cologuard recall.   EBT   Consdier seeing MTM pharmacist for medication review   Follow up 3 months          Relevant Medications    Prenatal Vit-Fe Fumarate-FA (PRENATAL MULTIVITAMIN W/IRON) 27-0.8 MG tablet    MOUNJARO 5 MG/0.5ML SOAJ    Other Relevant Orders    Med Therapy Management Referral       Endocrine    Type 2 diabetes mellitus without complication, without long-term current use of insulin (H)    Relevant Medications    Prenatal Vit-Fe Fumarate-FA (PRENATAL MULTIVITAMIN W/IRON) 27-0.8 MG tablet    MOUNJARO 5 MG/0.5ML SOAJ    Other Relevant Orders    Med Therapy Management Referral          INTERVAL HISTORY:  NBS 9/2023 BMI 56.5 with DMII, ZOHRA, GERD, and hx of PE. Mental health struggles contributing to weight gain including weight gain associated with medications. This was complicated by numerous episodes of swallowing non food items which has lead to perforations of the esophagus and strictures for which she is followed by GI. Had been stable for several months prior to a relapse just before our consult. We discussed mwm to start given increased risks after bariatric surgery to the stomach. Initially switched rybelsus for DMII to wegovy for better efficacy. Due to insurance change she is now taking ozempic.    last seen 7/2024 - reduced ozempic to 1mg due to disinterest in food      Has been having dental issues that are not healing well. Had surgery 1 week ago.   Rolled ankle and broke ankle 4 weeks ago     Was considering hysterectomy/ exp lap for endometriosis in Feb but then that OB left the system      Anti-obesity medication history    Current:   Ozempic 1mg (2mg)  -constipation worse recently - also taking iron - saw [place] [facility] - going to do colonoscopy   -miralax wasn't helping any more  -currently taking colace and magnesium, senna as needed   -some nausea in the AM     Past/Failed/contraindicated:   Failed metformin, rybelsus      Recent diet changes:   More stress eating/ snacking recently   More cravings for eating  out at fast food   Working on increasing protein now that she got the financial support for increasing protein   Recent stressors:   Anxiety worse   Lots of ED visits     Vitamins/Labs: 2/2025- reviewed in care everywhere  Some vitamin def. Would benefit from multivitamin     Rash-   Yeast like rash in skin fold of abdominal panus     CURRENT WEIGHT:   228 lbs 0 oz    Initial Weight (lbs): 309 lbs  Last Visits Weight: 104.3 kg (230 lb)  Cumulative weight loss (lbs): 81  Weight Loss Percentage: 26.21%        2/18/2025    11:09 AM   Changes and Difficulties   I have made the following changes to my diet since my last visit: Higher protein and anti inflammatory   With regards to my diet, I am still struggling with: Finding key ingredients to the foods that are in my diet within a reasonable price within my budget   I have made the following changes to my activity/exercise since my last visit: Walking more   With regards to my activity/exercise, I am still struggling with: Feeling comfortable using gym equipment in front of others         MEDICATIONS:   Current Outpatient Medications   Medication Sig Dispense Refill     MOUNJARO 5 MG/0.5ML SOAJ Inject 0.5 mLs (5 mg) subcutaneously every 7 days. 2 mL 2     Prenatal Vit-Fe Fumarate-FA (PRENATAL MULTIVITAMIN W/IRON) 27-0.8 MG tablet Take 1 tablet by mouth daily. 90 tablet 3     albuterol (PROAIR HFA/PROVENTIL HFA/VENTOLIN HFA) 108 (90 Base) MCG/ACT inhaler Inhale 2 puffs into the lungs every 6 hours as needed for shortness of breath / dyspnea or wheezing 18 g 0     albuterol (PROVENTIL) (2.5 MG/3ML) 0.083% neb solution Take 1 vial (2.5 mg) by nebulization every 6 hours as needed for shortness of breath or wheezing 90 mL 0     amitriptyline (ELAVIL) 25 MG tablet Take 35 mg by mouth at bedtime. 10 mg plus 25 mg = 35 mg       apixaban ANTICOAGULANT (ELIQUIS) 5 MG tablet Take 5 mg by mouth 2 times daily.       cetirizine (ZYRTEC) 10 MG tablet Take 1 tablet (10 mg) by mouth  daily 30 tablet 0     Cholecalciferol (D3 HIGH POTENCY) 25 MCG (1000 UT) CAPS Take 50 mcg by mouth daily       clonazePAM (KLONOPIN) 0.5 MG tablet Take 1 tablet (0.5 mg) by mouth daily as needed for anxiety 7 tablet 0     cyanocobalamin (VITAMIN B-12) 1000 MCG tablet Take 1,000 mcg by mouth daily.       ferrous sulfate (FEROSUL) 325 (65 Fe) MG tablet Take 1 tablet (325 mg) by mouth daily (with breakfast) 30 tablet 0     hydrOXYzine HCl (ATARAX) 25 MG tablet Take 25 mg by mouth every 8 hours as needed for anxiety.       norethindrone (AYGESTIN) 5 MG tablet Take 5 mg by mouth daily       polyethylene glycol (MIRALAX) 17 GM/Dose powder Take 17 g by mouth daily as needed for constipation       pregabalin (LYRICA) 100 MG capsule Take 100 mg by mouth 3 times daily. One table in the morning and two tablets at night       PSYLLIUM PO Take 1 tsp by mouth daily as needed (as needed).       Semaglutide, 1 MG/DOSE, (OZEMPIC) 4 MG/3ML pen Inject 1 mg subcutaneously every 7 days. 9 mL 1     Sodium Phosphates (FLEET ENEMA) ENEM Place 1 enema rectally as needed (as needed).       tiZANidine (ZANAFLEX) 4 MG tablet Take 4 mg by mouth at bedtime.             2/18/2025    11:09 AM   Weight Loss Medication History Reviewed With Patient   Which weight loss medications are you currently taking on a regular basis? Ozempic   If you are not taking a weight loss medication that was prescribed to you, please indicate why: Other   Are you having any side effects from the weight loss medication that we have prescribed you? Yes   If you are having side effects please describe: Severe constipation       Admission on 01/14/2025, Discharged on 01/14/2025   Component Date Value Ref Range Status     Troponin T, High Sensitivity 01/14/2025 10  <=14 ng/L Final    Either a High Sensitivity Troponin T baseline (0 hours) value = 100 ng/L, or an increase in High Sensitivity Troponin T = 7 ng/L at 2 hours compared to 0 hours (2-0 hours), suggests myocardial  injury, and urgent clinical attention is required.    If the 2-0 hours increase is <7 ng/L, a High Sensitivity Troponin T result above gender-specific reference ranges warrants further evaluation.   Recommendations for further evaluation include correlation with clinical decision-making tool (e.g., HEART), a 3rd High Sensitivity Troponin T test 2 hours after the 2nd (a 20% change from baseline would represent concern), admission for observation, close PCC/cardiology follow-up, or urgent outpatient provocative testing.     Lactic Acid 01/14/2025 1.2  0.7 - 2.0 mmol/L Final     Ventricular Rate 01/14/2025 77  BPM Final     Atrial Rate 01/14/2025 77  BPM Final     KS Interval 01/14/2025 148  ms Final     QRS Duration 01/14/2025 76  ms Final     QT 01/14/2025 396  ms Final     QTc 01/14/2025 448  ms Final     P Axis 01/14/2025 39  degrees Final     R AXIS 01/14/2025 60  degrees Final     T Sharon 01/14/2025 8  degrees Final     Interpretation ECG 01/14/2025    Final                    Value:Sinus rhythm  Normal ECG  When compared with ECG of 11-Jan-2025 05:37,  No significant change was found  Unconfirmed report - interpretation of this ECG is computer generated - see medical record for final interpretation  Confirmed by - EMERGENCY ROOM, PHYSICIAN ([id number]),  Carlton Nichole ([id number]) on 1/14/2025 8:33:39 AM       Color Urine 01/14/2025 Yellow  Colorless, Straw, Light Yellow, Yellow Final     Appearance Urine 01/14/2025 Slightly Cloudy (A)  Clear Final     Glucose Urine 01/14/2025 30 (A)  Negative mg/dL Final     Bilirubin Urine 01/14/2025 Negative  Negative Final     Ketones Urine 01/14/2025 20 (A)  Negative mg/dL Final     Specific Gravity Urine 01/14/2025 1.030  1.003 - 1.035 Final     Blood Urine 01/14/2025 Small (A)  Negative Final     pH Urine 01/14/2025 5.5  5.0 - 7.0 Final     Protein Albumin Urine 01/14/2025 70 (A)  Negative mg/dL Final     Urobilinogen Urine 01/14/2025 2.0  Normal, 2.0 mg/dL Final     Nitrite  "Urine 01/14/2025 Negative  Negative Final     Leukocyte Esterase Urine 01/14/2025 Negative  Negative Final     Bacteria Urine 01/14/2025 Moderate (A)  None Seen /HPF Final     Mucus Urine 01/14/2025 Present (A)  None Seen /LPF Final     RBC Urine 01/14/2025 5 (H)  <=2 /HPF Final     WBC Urine 01/14/2025 25 (H)  <=5 /HPF Final     Squamous Epithelials Urine 01/14/2025 3 (H)  <=1 /HPF Final     Hold Specimen 01/14/2025 JI   Final     Hold Specimen 01/14/2025 [facility]   Final     Hold Specimen 01/14/2025 [facility]   Final     Culture 01/14/2025 <10,000 CFU/mL Mixture of Urogenital [place]   Final     Color Urine 01/14/2025 Yellow  Colorless, Straw, Light Yellow, Yellow Final     Appearance Urine 01/14/2025 Slightly Cloudy (A)  Clear Final     Glucose Urine 01/14/2025 Negative  Negative mg/dL Final     Bilirubin Urine 01/14/2025 Negative  Negative Final     Ketones Urine 01/14/2025 40 (A)  Negative mg/dL Final     Specific Gravity Urine 01/14/2025 1.027  1.003 - 1.035 Final     Blood Urine 01/14/2025 Small (A)  Negative Final     pH Urine 01/14/2025 6.0  5.0 - 7.0 Final     Protein Albumin Urine 01/14/2025 30 (A)  Negative mg/dL Final     Urobilinogen Urine 01/14/2025 Normal  Normal, 2.0 mg/dL Final     Nitrite Urine 01/14/2025 Negative  Negative Final     Leukocyte Esterase Urine 01/14/2025 Negative  Negative Final     Bacteria Urine 01/14/2025 Moderate (A)  None Seen /HPF Final     Mucus Urine 01/14/2025 Present (A)  None Seen /LPF Final     RBC Urine 01/14/2025 3 (H)  <=2 /HPF Final     WBC Urine 01/14/2025 21 (H)  <=5 /HPF Final     Squamous Epithelials Urine 01/14/2025 3 (H)  <=1 /HPF Final           9/13/2023    10:26 AM   BRIAN Score (Last Two)   BRIAN Raw Score 37   Activation Score 79.2   BRIAN Level 4         PHYSICAL EXAM:  Objective   Ht 1.575 m (5' 2.01\")   Wt 103.4 kg (228 lb)   BMI 41.69 kg/m      Vitals - Patient Reported  Pain Score: Moderate Pain (5)  Pain Loc: Other - see comment (all over body pain)        GENERAL: " alert and no distress  EYES: Eyes grossly normal to inspection.  No discharge or erythema, or obvious scleral/conjunctival abnormalities.  RESP: No audible wheeze, cough, or visible cyanosis.    SKIN: Visible skin clear. No significant rash, abnormal pigmentation or lesions.  NEURO: Cranial nerves grossly intact.  Mentation and speech appropriate for age.  PSYCH: Appropriate affect, tone, and pace of words        Sincerely,    Sharon Toro NP      32 minutes spent by me on the date of the encounter doing chart review, history and exam, documentation and further activities per the note    The longitudinal plan of care for the diagnosis(es)/condition(s) as documented were addressed during this visit. Due to the added complexity in care, I will continue to support Nevin in the subsequent management and with ongoing continuity of care.    Virtual Visit Details    Type of service:  Video Visit   Video Start Time:  1130  Video End Time: 1202    Originating Location (pt. Location): Home    Distant Location (provider location):  On-site  Platform used for Video Visit: AmWell      Again, thank you for allowing me to participate in the care of your patient.      Sincerely,    Sharon Toro NP

## 2025-02-18 NOTE — ASSESSMENT & PLAN NOTE
Constipation has become more severe. Went 2.5weeks without BM. Lots of nausea/ vomiting, bile reflux. Seen by [facility] who suggested follow up to discuss ozempic. Has had constipation since starting ozempic but previously constipation was described as manageable and mild. Could consider switching to mounjaro if covered by insurance. This could also be beneficial with the increased cravings/ snacking and weight stall since increased stress.     Wondering about referral to MT for medication review. Referral placed. She may be moving care to [facility] so she could do MTM there too.     Living on own now. Needing to budget. EBT diet form filled out last week to help with high protein diet recommendations.     Consider adding multivitamin   Consider switching to mounjaro 5mg   Consider trying protein powder if covered by EBT   Consdier seeing [facility] pharmacist for medication review   Follow up 3 months

## 2025-02-18 NOTE — NURSING NOTE
Current patient location: [address] [place] [address]   [facility] [address]    Is the patient currently in the state of [place]? YES    Visit mode: VIDEO    If the visit is dropped, the patient can be reconnected by:VIDEO VISIT: Text to cell phone:   Telephone Information:   Mobile [phone]    and VIDEO VISIT: Send to e-mail at: [email or web address]    Will anyone else be joining the visit? NO  (If patient encounters technical issues they should call [phone]    Are changes needed to the allergy or medication list? No    Are refills needed on medications prescribed by this physician? Discuss with provider    Rooming Documentation:  Questionnaire(s) completed    Reason for visit: RECHECK    Rosa Gilman VVF    Per pt has had severe constipation that has led to going to the emergency room and would like to discuss this w/provider in the appt

## 2025-02-18 NOTE — Clinical Note
Fascinating. Saw her today. ILS worker was with her in the room but not on video. She was better than I've ever seen her - EVER- like coherent train of thought, knew answers to questions. She has borderline personality but I'd never seen that side of things.

## 2025-02-18 NOTE — PATIENT INSTRUCTIONS
Consider adding multivitamin   Consider switching to mounjaro 5mg   Consider trying protein powder if covered by EBT   Consdier seeing [facility] pharmacist for medication review   Follow up 3 months

## 2025-02-18 NOTE — TELEPHONE ENCOUNTER
PA Initiation    Medication: MOUNJARO 5 MG/0.5ML SC SOAJ  Insurance Company: [employer] Non-Specialty PA's - Phone [phone] Fax [phone]  [id number] [address] [facility] [address]  Filling Pharmacy Phone:    Filling Pharmacy Fax:    Start Date: 2/18/2025     Key [id number]

## 2025-02-19 ENCOUNTER — TELEPHONE (OUTPATIENT)
Dept: ENDOCRINOLOGY | Facility: CLINIC | Age: 35
End: 2025-02-19
Payer: COMMERCIAL

## 2025-02-19 NOTE — TELEPHONE ENCOUNTER
Prior Authorization Approval    Medication: MOUNJARO 5 MG/0.5ML SC SOAJ  Authorization Effective Date: 2/18/2025  Authorization Expiration Date: 2/18/2026  Approved Dose/Quantity: 2ml per 28 days  Reference #: [id number]   Insurance Company: [employer] Non-Specialty PA's - Phone [phone] Fax [phone]  Expected CoPay: $ 0  CoPay Card Available:      Financial Assistance Needed:   Which Pharmacy is filling the prescription: [id number] [place], [place] - [address] [address] [address] [facility] [address]  Pharmacy Notified: rx released  Patient Notified: mychart msg

## 2025-02-19 NOTE — TELEPHONE ENCOUNTER
MTM referral from: [place] clinic visit (referral by provider)    MTM referral outreach attempt #1 on February 19, 2025 at 12:32 PM      Outcome: Spoke with patient She would like a mychart with our info and she will call to schedule when she is ready     MyChart Message Sent    Elke Hutson  [facility]

## 2025-02-19 NOTE — TELEPHONE ENCOUNTER
General Call    Contacts       Contact Date/Time Type Contact Phone/Fax    02/19/2025 07:49 AM CST Phone (Incoming) Nevin Alvarado (Self) [phone] (M)          Reason for Call:    Please call pt to discuss MOUNJARO 5 MG/0.5ML SOAJ     She says Insurance called last night PA approved, and wants sent to pharm  AND.. she has lots of ozempic left.. what to do?        Could we send this information to you in [employer] or would you prefer to receive a phone call?:   Patient would prefer a phone call   Okay to leave a detailed message?: Yes at Cell number on file:    Telephone Information:   Mobile [phone]

## 2025-02-23 ENCOUNTER — HEALTH MAINTENANCE LETTER (OUTPATIENT)
Age: 35
End: 2025-02-23

## 2025-02-27 ENCOUNTER — HOSPITAL ENCOUNTER (EMERGENCY)
Facility: CLINIC | Age: 35
Discharge: HOME | End: 2025-02-27
Attending: EMERGENCY MEDICINE | Admitting: EMERGENCY MEDICINE
Payer: COMMERCIAL

## 2025-02-27 VITALS
HEIGHT: 62 IN | HEART RATE: 105 BPM | TEMPERATURE: 98.5 F | WEIGHT: 228 LBS | SYSTOLIC BLOOD PRESSURE: 138 MMHG | BODY MASS INDEX: 41.96 KG/M2 | DIASTOLIC BLOOD PRESSURE: 94 MMHG | OXYGEN SATURATION: 96 % | RESPIRATION RATE: 22 BRPM

## 2025-02-27 DIAGNOSIS — R20.2 PARESTHESIAS: ICD-10-CM

## 2025-02-27 PROCEDURE — 99283 EMERGENCY DEPT VISIT LOW MDM: CPT

## 2025-02-27 ASSESSMENT — ACTIVITIES OF DAILY LIVING (ADL): ADLS_ACUITY_SCORE: 68

## 2025-02-28 NOTE — DISCHARGE INSTRUCTIONS
As we discussed in the ER, I do not think you need emergent imaging including MRI for your symptoms today.  I suspect that your collapsed arches are leading to abnormal mechanics and how you are walking, which is putting pressure on the nerves in your foot.  I have placed a referral for a podiatrist named Dr. Plunkett to evaluate you further.  Return to the ED if you develop worsening symptoms including weakness, increasing pain, redness, swelling, or for any other concerns.

## 2025-02-28 NOTE — ED NOTES
"Patient brought in from home, lives alone. Able to ambulate at home. Patient reports chiropractor appointment in December that has caused back pain since. Patient reports her back pain goes from mid back to lower back down to her left foot/heel. \"Popping\" sensation in mid/low back. Reports numbness and tingling, \"electrical shock\" feeling in left foot, 10/10 pain. Tylenol about 2 hours ago. Takes scheduled Lyrica and Eliquis (hx of PE's). Writer able to palpate pedal pulses and popliteal pulses in left leg. Drop foot in left- baseline per patient. Bowel/bladder control intact.  "
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
## 2025-02-28 NOTE — ED NOTES
Bed: ED23  Expected date:   Expected time:   Means of arrival:   Comments:  [age]F back pain since last year

## 2025-03-03 NOTE — PROGRESS NOTES
"Video-Visit Details    Type of service:  Video Visit    Video Start Time: 8:51 AM  Video End Time: 9:30 AM     Originating Location (pt. Location): Home    Distant Location (provider location): Offsite (providers home) [facility] [place]     Platform [facility]: [facility]    Return Nutrition Consultation Note    Reason For Visit: Nutrition Assessment    Nevin Alvarado is a 34 year old presenting today for return nutrition medical weight management consult.  Patient is accompanied by self.     Pt referred by Sharon Toro NP on September 12, 2023.  CO-MORBIDITIES OF OBESITY INCLUDE:        9/12/2023    12:48 PM   --   I have the following health issues associated with obesity Type II Diabetes    High Blood Pressure    Sleep Apnea    Polycystic Ovarian Syndrome    GERD (Reflux)    Fatty Liver    Asthma    Stress Incontinence     SUPPORT:      9/12/2023    12:48 PM   Support System Reviewed With Patient   Who do you have in your support network that can be available to help you for the first 2 weeks after surgery? I live in a group home with 24 hour staff, mom   Who can you count on for support throughout your weight loss surgery journey? a friend, and my therapist     ANTHROPOMETRICS:  Initial Consult Weight: 309 lb on 9/13/23  Estimated body mass index is 41.7 kg/m  as calculated from the following:    Height as of this encounter: 1.575 m (5' 2\").    Weight as of this encounter: 103.4 kg (228 lb).  Current Weight: 228 lb per pt report     Wt Readings from Last 5 Encounters:   02/27/25 103.4 kg (228 lb)   02/18/25 103.4 kg (228 lb)   01/22/25 104.3 kg (230 lb)   01/11/25 104.3 kg (230 lb)   01/07/25 104.3 kg (230 lb)         9/12/2023    12:48 PM   --   I have tried the following methods to lose weight Watching portions or calories    Exercise    Pre packaged meals ex: Nutrisystem    OTC Medications    Prescription Medications         9/12/2023    12:48 PM   Weight Loss " Questions Reviewed With Patient   How long have you been overweight? Since late teens through early 20's     MEDICATION FOR WEIGHT LOSS:  Ozempic 1 mg     Hx of self harm- swallowing thing- started after she was treated for anorexia when taking topiramate- in 6 months has only had one day of self harm- this was 2 weeks ago and instead of swallowing she inserted something per rectum. - Followed by MASON Potter PA-C, PE in 2019 after esophageal perforation repair     VITAMIN/MINERAL SUPPLEMENTS:  Iron, Vitamin B12, Vitamin D     NUTRITION HISTORY:  Food allergies:NKFA  Food intolerances: None  Previous experience with diet modification for weight loss: Nutrisystem, prescription medications, exercise, watching calories or portions     Has been meal planning for the past 3 months. Likes chicken, turkey, shrimp.      October 2023: Eating 3 meals per day. Lunch and dinner meals have been chili or mediterranean orzo salad with artichokes more recently. Drinks mostly water, Cup of coffee, [employer]. Doesn't drink soda or juice. Uses a walker for neuropathy. Walking around more often/standing longer which has been an improvement.     November 2023: Reports she has been sick a lot recently. Sometimes feels she has to force herself to eat. Currently has a cold. Working with a GI doctor right now also as she has been having some GI symptoms after eating certain foods. Reports she had another self harm episode unfortunately, feels this will delay her chances for surgery.     January 2024: Had been dealing with illness recently which set her back a little bit on her goals. Has been using Wegovy. Feels since starting the Wegovy her face gets puffy or red blotches on it, plans to keep a close eye on it before deciding to stop. Had been eating very well but finding more temptations now. Feels she is not cooking as often and doing more frozen meals.  Hasn't been using walker for a few weeks.     April 2024: Since  visiting Sharon nutrition has been off and on since she was in the hospital and multiple trips to the ED. Currently being treated for pneumonia, PE, depression and anxiety. Reports she continues to have constipation/diarrhea on and off. Has Miralax as needed. Also has enema prescription also. Nutrition has been very challenging over the last month due to being in the hospital for 15 days. She did continue to do her Wegovy injections while in the hospital. Plan is to focus on getting healthy and feeling better then will work on her good eating habits again.      June 2024: Things have been going a lot better for patient since last visit. She switched from Wegovy to Ozempic over the last month. Eating 2-3 meals a day. Bowel habits have improved since last RD visit. Physical activity has gotten a lot better with the weight loss. Does not use walker anymore. Walking around a lot. Also spoke with a diabetes educator in May.     August 2024: Psychologist recommended patient not have surgery. Patient is ok with this and is happy with her weight loss on AOMs.     Last few months have been harder due to health conditions she has been dealing with and some stress around her guardianship and housing. She is trying her best to eat healthfully but hasn't found much interest in cooking. If her housing situation changes she is going to look into seeing if she can get Mom's Meals. Trying to increase her fluid intake. Will have constipation at times but feels this has gotten a little better since her last trip to the ED.     Physical activity - will walk instead of using the scooter at stores.     Per Sharon Toro NP's recent note: Tolerating ozempic 1mg well. Disinterest in food, nausea/ vomiting and constipation have resolved. Appetite okay. Intake has been limited by new dental issues that have been going on for the last several weeks. She should be done now with dental procedures so discussed getting back on track with adequate  protein and hydration. Stressed importance of not skipping meals, adequate protein and hydration.     November 2024: Moving in 3 weeks. Patient is her own guardian now.  Cooking your own meals. On the Ozempic 1 mg dose. Has been tolerating that dose. Has been dealing with constipation. Eating 3 small meals a day. Prioritizing protein the best she can. Doing better with her fluid intake now that she can drink from straws again. Averaging about 30 oz of water daily.     Physical activity - getting easier. Able to walk more, can get through the stores a lot easier when shopping.     Has a gym in her new apartment building.     January 2025: Patient reports she has been dealing with constipation issues. This ended up in her going to the ED multiple times in the last week. Now taking daily colace and that has been helpful.      In the last month patient moved into her own apartment. She has been struggling financially to afford groceries. Patient sent a [employer] message earlier today with a form from the [facility] that she is hoping the weight management team can fill out to help see if she can obtain some additional support for food.     Encouraged patient to check out my resources that I have included in my not for some food assistance programs in the [facility].     March 2025: Currently in the process for prepping for a colonoscopy. Got some additional support from the [facility] for groceries. Continues to have less motivation to cook for herself. Finding it hard still to afford groceries even with additional help from [facility]. Recently got a [employer] membership and is hopeful she can get some good protein options. Patient bought some protein bars recently, Hoahaoism protein oatmeal, and also made homemade breakfast sandwiches.     Patient is going to look into possibly getting the Fairlife or Premier Protein shakes. Also is going to look into some higher protein frozen meal options. Previously has done Mom's Meals, she is going  to look into the requirements for that program with her [employer] worker.         9/12/2023    12:48 PM   Recall Diet Questions Reviewed With Patient   Describe what you typically consume for breakfast (typical or most recent) eggs and hash browns, homemade waffles, laith pudding with fruit   Describe what you typically consume for lunch (typical or most recent) homemade lunchables, some pasta or chicken   Describe what you typically consume for supper (typical or most recent) low calorie meal prep meals   Describe what you typically consume as snacks (typical or most recent) cheese sticks, fruit jerky, fruits/vegetables   How many ounces of water, or other low calorie drinks, do you drink daily (8 oz=1 glass)? 48 oz   How many ounces of caffeine (coffee, tea, pop) do you drink daily (8 oz=1 glass)? 16 oz   How many ounces of carbonated (pop, beer, sparkling water) drinks do you drinky daily (8 oz=1 glass)? 0 oz   How many ounces of juice, pop, sweet tea, sports drinks, protein drinks, other sweetened drinks, do you drink daily (8 oz=1 glass)? 0 oz   How many ounces of milk do you drink daily (8 oz=1 glass) 0 oz   How often do you drink alcohol? Never           9/12/2023    12:48 PM   Eating Habits   What foods do you crave? ice cream, chocolate, sometimes just salty chips           9/12/2023    12:48 PM   Dining Out History Reviewed With Patient   How often do you dine out? Rarely.   Where do you dine out? (select all that apply) take out   What types of food do you order when you dine out? jitendra verdin     EXERCISE:        7/20/2024     9:33 AM   --   How often do you exercise? 1 to 2 times per week   What is the duration of your exercise (in minutes)? 15 Minutes   What types of exercise do you do? walking   What keeps you from being more active? Pain     NUTRITION DIAGNOSIS:  Obesity r/t long history of positive energy balance aeb BMI >30 kg/m2.    INTERVENTION:  Intervention Provided/Education Provided on post-op diet  guidelines, vitamins/minerals essential post-operatively, GI anatomy of bariatric surgeries, ways to help prepare for post-op diet guidelines pre-operatively, portion/calorie-control, mindful eating and sources of protein.  Patient demonstrates understanding.     Personal barriers to making and continuing required life changes have been identified, and strategies to overcome those barriers have been recommended AND family and social supports have been assessed and strategies to strengthen those supports have been recommended.    Provided pt with list of goals, RD contact information and resources listed below via [employer].           9/12/2023    12:48 PM   Questions Reviewed With Patient   How ready are you to make changes regarding your weight? Number 1 = Not ready at all to make changes up to 10 = very ready. 10   How confident are you that you can change? 1 = Not confident that you will be successful making changes up to 10 = very confident. 7     Expected Engagement: good    GOALS:  1) Continue to eat 3 small meals daily  2) Have a good protein source at all meals  3) Keep up with hydration, aim for 64 oz of fluid daily.     Eating Well on a Budget: [email or web address]    Cost friendly protein sources   Peanut Butter  Eggs  Edamame  Canned Tuna  Canned [place]  Greek Yogurt  Sunflower Seeds  Black Beans  Cottage Cheese  Lentils  Oats  Milk  Pumpkin Seeds  Ground [place]  Tempeh    [email or web address]    Follow Up: 2 months     Time spent with patient: 39 minutes.  Nevin Birmingham RD, LD

## 2025-03-05 ENCOUNTER — VIRTUAL VISIT (OUTPATIENT)
Dept: ENDOCRINOLOGY | Facility: CLINIC | Age: 35
End: 2025-03-05
Payer: COMMERCIAL

## 2025-03-05 VITALS — HEIGHT: 62 IN | WEIGHT: 228 LBS | BODY MASS INDEX: 41.96 KG/M2

## 2025-03-05 DIAGNOSIS — Z71.3 NUTRITIONAL COUNSELING: Primary | ICD-10-CM

## 2025-03-05 DIAGNOSIS — E11.9 TYPE 2 DIABETES MELLITUS WITHOUT COMPLICATION, WITHOUT LONG-TERM CURRENT USE OF INSULIN (H): ICD-10-CM

## 2025-03-05 DIAGNOSIS — E66.9 OBESITY: ICD-10-CM

## 2025-03-05 ASSESSMENT — PAIN SCALES - GENERAL: PAINLEVEL_OUTOF10: NO PAIN (0)

## 2025-03-05 NOTE — NURSING NOTE
Current patient location: Patient declined to provide     Is the patient currently in the state of [place]? YES    Visit mode: VIDEO    If the visit is dropped, the patient can be reconnected by:VIDEO VISIT: Text to cell phone:   Telephone Information:   Mobile [phone]       Will anyone else be joining the visit? NO  (If patient encounters technical issues they should call [phone]    Are changes needed to the allergy or medication list? N/A    Are refills needed on medications prescribed by this physician? NO    Rooming Documentation:  Questionnaire(s) completed    Reason for visit: ARMIDA ANTONIO

## 2025-03-05 NOTE — PATIENT INSTRUCTIONS
Jay Rooney,     Follow-up with SIDNEY in 2 months.     Thank you,    Nevin Birmingham, SIDNEY, LD  If you would like to schedule or reschedule an appointment with the RD, please call [phone]    Nutrition Goals  1) Continue to eat 3 small meals daily  2) Have a good protein source at all meals  3) Keep up with hydration, aim for 64 oz of fluid daily.     Eating Well on a Budget: [email or web address]    Cost friendly protein sources   Peanut Butter  Eggs  Edamame  Canned Tuna  Canned [place]  Greek Yogurt  Sunflower Seeds  Black Beans  Cottage Cheese  Lentils  Oats  Milk  Pumpkin Seeds  Ground [place]  Tempeh    [email or web address]    COMPREHENSIVE WEIGHT MANAGEMENT PROGRAM  VIRTUAL SUPPORT GROUPS    At [facility] Comprehensive Weight Management program offers on-line support groups for patients who are working on weight loss and considering, preparing for, or have had weight loss surgery.     There is no cost for this opportunity.  You are invited to attend the?Virtual Support Groups?provided by any of the following locations:    [facility] via Microsoft Teams with Roxanna Alonso RD, RN  2.   [place] via [employer] with Bird Franz, PhD, LP  3.   [place] via [employer] with Margie Stock RN  4.   [facility] via a Zoom Meeting with RUBÉN San    The following Support Group information can also be found on our website:  [email or web address]      [facility]   WEIGHT LOSS SURGERY SUPPORT GROUP  The support group is a patient-lead forum that meets monthly to share experiences, encouragement and education. It is open to those who have had weight loss surgery, are scheduled for surgery, or are considering surgery.   WHEN: 3rd Wednesday of each month from 5:00PM - 6:00PM using Microsoft Teams.   FACILITATOR: Led by Roxanna Greenberg  BLADE LITTLE, RN, the program's Clinical Coordinator.   TO REGISTER: Please contact the clinic via [employer] or call the nurse line directly at [phone] to inform our staff that you would like an invite sent to you and to let us know the email you would like the invite sent to. Prior to the meeting, a link with directions on how to join the meeting will be sent to you.    2025 Meetings, 3rd Wednesday, 5:00pm - 6:00pm    January 15: Let's Talk  February19: Let's Talk  March 19: Speaker: Royce Agrawal RD, LD  April 16: Let's Talk  May 21: Speaker: Caorl Thorne RD, LD  June18: Let's Talk  July 16: Let's Talk  August 20: Let's Talk  September 17: No meeting.  October 15: Speaker: Rhea Van PsyD, RAJI  November 19: Let's Talk  December 17: Let's Talk    [facility] and [facility] BARIATRIC CARE SUPPORT GROUP  This is open to all pre- and post- operative bariatric surgery patients as well as their support system.   WHEN: 3rd Tuesday of each month from 6:30 PM - 8:00 PM using Microsoft Teams.   FACILITATOR: Led by Bird Franz, Ph.D who is a Licensed Psychologist with the [facility] Comprehensive Weight Management Program.   TO REGISTER: Please send an email to Bird Franz, Ph.D., LP [email or web address] you would like an invitation to the group. Prior to the meeting, a link with directions on how to join the meeting will be sent to you.    2025 Meetings, 3rd Tuesday January 21st: Open Forum  February 18th: Medications and Bariatric Surgery  Speaker: Estrella Plunkett, [facility] Resident  March 18th: Open Forum  April 15th: Genetics of Obesity as well as Q&A, Speaker: Diana Main MD, [facility] Comprehensive Weight Management Program.  May 20th: Open Forum  June 17th: Nutritional Labeling, Speaker: NAVEEN  July 15th: Open Forum  August 19th: Open Forum  September 16th: Open Forum  October 21st: Open Forum  November 18th: Holiday Eating, Speaker:  TBD  December 16th: Open Forum    [facility] POST-OPERATIVE BARIATRIC SURGERY SUPPORT GROUP  This is a support group for [facility] bariatric patients (and those external to [facility]) who have had bariatric surgery and are at least 3 months post-surgery.  WHEN: 4th Thursday of the month from 11:00 AM - 12:00 PM using Microsoft Teams.   FACILITATOR: Led by Certified Bariatric Nurse, Margie Stock RN, CBN.   TO REGISTER: Please send an email to Margie at [email or web address] if you would like an invitation to the group.  Prior to the meeting, a link with directions on how to join the meeting will be sent to you.    2025 Meetings, 4th Thursday, 11:00am - 12:pm  January 23  February 27  March 27  April 24  May 22  Candice 26  July 24  August 28  September 25  October 23  November 27: Thanksgiving Day, No meeting.      December 25: [place] Day, No meeting.        [facility]   HEALTHY LIFESTYLE COACHING GROUP  This is a 60 minute virtual coaching group for those who want to lead a healthier lifestyle. Come together to set goals and overcome barriers in a supportive group environment. We will address the four pillars of health: nutrition, exercise, sleep, and emotional well-being.   WHEN: 1st Friday of the month, 12:30 PM - 1:30 PM   using a Zoom meeting.     FACILITATOR: Led by National Board-Certified Health and , Margie Yan, Counts include 234 beds at the [facility].  TO REGISTER: Please call the [employer] at [phone] to register. You will get an appointment to attend in [employer]. Fifteen minutes prior to the meeting, complete the e-check in and you will get the link to join the meeting.  There is no charge to attend this group and space is limited.  Please register for each month you wish to attend    2025 Meetings, 1st Friday, 12:30pm-1:30pm  January 3  February 7  March 7: No meeting.  April  4  May 2  Candice 6  July 4: Fourth of July Holiday, No meeting.    August 1  September 5  October 3  November 7  December 5

## 2025-03-05 NOTE — LETTER
"3/5/2025       RE: Nevin Alvarado  [address]  [facility] [address]     Dear Colleague,    Thank you for referring your patient, Nevin Alvarado, to the [facility] [place] at [facility]. Please see a copy of my visit note below.    Video-Visit Details    Type of service:  Video Visit    Video Start Time: 8:51 AM  Video End Time: 9:30 AM     Originating Location (pt. Location): Home    Distant Location (provider location): Offsite (providers home) [facility] [place]     Platform used for Video Visit: AmJooobz!    Return Nutrition Consultation Note    Reason For Visit: Nutrition Assessment    Nevin Alvarado is a 34 year old presenting today for return nutrition medical weight management consult.  Patient is accompanied by self.     Pt referred by Sharon Toro NP on September 12, 2023.  CO-MORBIDITIES OF OBESITY INCLUDE:        9/12/2023    12:48 PM   --   I have the following health issues associated with obesity Type II Diabetes    High Blood Pressure    Sleep Apnea    Polycystic Ovarian Syndrome    GERD (Reflux)    Fatty Liver    Asthma    Stress Incontinence     SUPPORT:      9/12/2023    12:48 PM   Support System Reviewed With Patient   Who do you have in your support network that can be available to help you for the first 2 weeks after surgery? I live in a group home with 24 hour staff, mom   Who can you count on for support throughout your weight loss surgery journey? a friend, and my therapist     ANTHROPOMETRICS:  Initial Consult Weight: 309 lb on 9/13/23  Estimated body mass index is 41.7 kg/m  as calculated from the following:    Height as of this encounter: 1.575 m (5' 2\").    Weight as of this encounter: 103.4 kg (228 lb).  Current Weight: 228 lb per pt report     Wt Readings from Last 5 Encounters:   02/27/25 103.4 kg (228 lb)   02/18/25 103.4 kg (228 lb) "   01/22/25 104.3 kg (230 lb)   01/11/25 104.3 kg (230 lb)   01/07/25 104.3 kg (230 lb)         9/12/2023    12:48 PM   --   I have tried the following methods to lose weight Watching portions or calories    Exercise    Pre packaged meals ex: Nutrisystem    OTC Medications    Prescription Medications         9/12/2023    12:48 PM   Weight Loss Questions Reviewed With Patient   How long have you been overweight? Since late teens through early 20's     MEDICATION FOR WEIGHT LOSS:  Ozempic 1 mg     Hx of self harm- swallowing thing- started after she was treated for anorexia when taking topiramate- in 6 months has only had one day of self harm- this was 2 weeks ago and instead of swallowing she inserted something per rectum. - Followed by MASON Potter PA-C, PE in 2019 after esophageal perforation repair     VITAMIN/MINERAL SUPPLEMENTS:  Iron, Vitamin B12, Vitamin D     NUTRITION HISTORY:  Food allergies:NKFA  Food intolerances: None  Previous experience with diet modification for weight loss: Nutrisystem, prescription medications, exercise, watching calories or portions     Has been meal planning for the past 3 months. Likes chicken, turkey, shrimp.      October 2023: Eating 3 meals per day. Lunch and dinner meals have been chili or mediterranean orzo salad with artichokes more recently. Drinks mostly water, Cup of coffee, [employer]. Doesn't drink soda or juice. Uses a walker for neuropathy. Walking around more often/standing longer which has been an improvement.     November 2023: Reports she has been sick a lot recently. Sometimes feels she has to force herself to eat. Currently has a cold. Working with a GI doctor right now also as she has been having some GI symptoms after eating certain foods. Reports she had another self harm episode unfortunately, feels this will delay her chances for surgery.     January 2024: Had been dealing with illness recently which set her back a little bit on her goals. Has  been using Wegovy. Feels since starting the Wegovy her face gets puffy or red blotches on it, plans to keep a close eye on it before deciding to stop. Had been eating very well but finding more temptations now. Feels she is not cooking as often and doing more frozen meals.  Hasn't been using walker for a few weeks.     April 2024: Since visiting Sharon nutrition has been off and on since she was in the hospital and multiple trips to the ED. Currently being treated for pneumonia, PE, depression and anxiety. Reports she continues to have constipation/diarrhea on and off. Has Miralax as needed. Also has enema prescription also. Nutrition has been very challenging over the last month due to being in the hospital for 15 days. She did continue to do her Wegovy injections while in the hospital. Plan is to focus on getting healthy and feeling better then will work on her good eating habits again.      June 2024: Things have been going a lot better for patient since last visit. She switched from Wegovy to Ozempic over the last month. Eating 2-3 meals a day. Bowel habits have improved since last RD visit. Physical activity has gotten a lot better with the weight loss. Does not use walker anymore. Walking around a lot. Also spoke with a diabetes educator in May.     August 2024: Psychologist recommended patient not have surgery. Patient is ok with this and is happy with her weight loss on AOMs.     Last few months have been harder due to health conditions she has been dealing with and some stress around her guardianship and housing. She is trying her best to eat healthfully but hasn't found much interest in cooking. If her housing situation changes she is going to look into seeing if she can get Mom's Meals. Trying to increase her fluid intake. Will have constipation at times but feels this has gotten a little better since her last trip to the ED.     Physical activity - will walk instead of using the scooter at stores.     Per  Sharon Toro NP's recent note: Tolerating ozempic 1mg well. Disinterest in food, nausea/ vomiting and constipation have resolved. Appetite okay. Intake has been limited by new dental issues that have been going on for the last several weeks. She should be done now with dental procedures so discussed getting back on track with adequate protein and hydration. Stressed importance of not skipping meals, adequate protein and hydration.     November 2024: Moving in 3 weeks. Patient is her own guardian now.  Cooking your own meals. On the Ozempic 1 mg dose. Has been tolerating that dose. Has been dealing with constipation. Eating 3 small meals a day. Prioritizing protein the best she can. Doing better with her fluid intake now that she can drink from straws again. Averaging about 30 oz of water daily.     Physical activity - getting easier. Able to walk more, can get through the stores a lot easier when shopping.     Has a gym in her new apartment building.     January 2025: Patient reports she has been dealing with constipation issues. This ended up in her going to the ED multiple times in the last week. Now taking daily colace and that has been helpful.      In the last month patient moved into her own apartment. She has been struggling financially to afford groceries. Patient sent a [employer] message earlier today with a form from the [facility] that she is hoping the weight management team can fill out to help see if she can obtain some additional support for food.     Encouraged patient to check out my resources that I have included in my not for some food assistance programs in the [facility].     March 2025: Currently in the process for prepping for a colonoscopy. Got some additional support from the [facility] for groceries. Continues to have less motivation to cook for herself. Finding it hard still to afford groceries even with additional help from [facility]. Recently got a [employer] membership and is hopeful she can get some  good protein options. Patient bought some protein bars recently, Pentecostal protein oatmeal, and also made homemade breakfast sandwiches.     Patient is going to look into possibly getting the Fairlife or Premier Protein shakes. Also is going to look into some higher protein frozen meal options. Previously has done Mom's Meals, she is going to look into the requirements for that program with her [employer] worker.         9/12/2023    12:48 PM   Recall Diet Questions Reviewed With Patient   Describe what you typically consume for breakfast (typical or most recent) eggs and hash browns, homemade waffles, laith pudding with fruit   Describe what you typically consume for lunch (typical or most recent) homemade lunchables, some pasta or chicken   Describe what you typically consume for supper (typical or most recent) low calorie meal prep meals   Describe what you typically consume as snacks (typical or most recent) cheese sticks, fruit jerky, fruits/vegetables   How many ounces of water, or other low calorie drinks, do you drink daily (8 oz=1 glass)? 48 oz   How many ounces of caffeine (coffee, tea, pop) do you drink daily (8 oz=1 glass)? 16 oz   How many ounces of carbonated (pop, beer, sparkling water) drinks do you drinky daily (8 oz=1 glass)? 0 oz   How many ounces of juice, pop, sweet tea, sports drinks, protein drinks, other sweetened drinks, do you drink daily (8 oz=1 glass)? 0 oz   How many ounces of milk do you drink daily (8 oz=1 glass) 0 oz   How often do you drink alcohol? Never           9/12/2023    12:48 PM   Eating Habits   What foods do you crave? ice cream, chocolate, sometimes just salty chips           9/12/2023    12:48 PM   Dining Out History Reviewed With Patient   How often do you dine out? Rarely.   Where do you dine out? (select all that apply) take out   What types of food do you order when you dine out? jitendra verdin     EXERCISE:        7/20/2024     9:33 AM   --   How often do you exercise? 1 to 2  times per week   What is the duration of your exercise (in minutes)? 15 Minutes   What types of exercise do you do? walking   What keeps you from being more active? Pain     NUTRITION DIAGNOSIS:  Obesity r/t long history of positive energy balance aeb BMI >30 kg/m2.    INTERVENTION:  Intervention Provided/Education Provided on post-op diet guidelines, vitamins/minerals essential post-operatively, GI anatomy of bariatric surgeries, ways to help prepare for post-op diet guidelines pre-operatively, portion/calorie-control, mindful eating and sources of protein.  Patient demonstrates understanding.     Personal barriers to making and continuing required life changes have been identified, and strategies to overcome those barriers have been recommended AND family and social supports have been assessed and strategies to strengthen those supports have been recommended.    Provided pt with list of goals, RD contact information and resources listed below via [employer].           9/12/2023    12:48 PM   Questions Reviewed With Patient   How ready are you to make changes regarding your weight? Number 1 = Not ready at all to make changes up to 10 = very ready. 10   How confident are you that you can change? 1 = Not confident that you will be successful making changes up to 10 = very confident. 7     Expected Engagement: good    GOALS:  1) Continue to eat 3 small meals daily  2) Have a good protein source at all meals  3) Keep up with hydration, aim for 64 oz of fluid daily.     Eating Well on a Budget: https://www.fvfiles.com/632984.pdf    Cost friendly protein sources   Peanut Butter  Eggs  Edamame  Canned Tuna  Canned [place]  Greek Yogurt  Sunflower Seeds  Black Beans  Cottage Cheese  Lentils  Oats  Milk  Pumpkin Seeds  [place]  Tempeh    [email or web address]/[email or web address]-food-programs/[email or web address]-gy-sdobmlb/[email or web address]    Follow Up: 2 months     Time spent with patient: 39 minutes.  Nevin Birmingham, SIDNEY, LD        Again, thank you for  allowing me to participate in the care of your patient.      Sincerely,    Nevin Birmingham RD

## 2025-03-09 ENCOUNTER — APPOINTMENT (OUTPATIENT)
Dept: GENERAL RADIOLOGY | Facility: CLINIC | Age: 35
End: 2025-03-09
Attending: EMERGENCY MEDICINE
Payer: COMMERCIAL

## 2025-03-09 ENCOUNTER — HOSPITAL ENCOUNTER (EMERGENCY)
Facility: CLINIC | Age: 35
Discharge: HOME | End: 2025-03-10
Attending: EMERGENCY MEDICINE | Admitting: EMERGENCY MEDICINE
Payer: COMMERCIAL

## 2025-03-09 VITALS
OXYGEN SATURATION: 98 % | RESPIRATION RATE: 22 BRPM | TEMPERATURE: 98.2 F | SYSTOLIC BLOOD PRESSURE: 155 MMHG | DIASTOLIC BLOOD PRESSURE: 111 MMHG | HEART RATE: 105 BPM

## 2025-03-09 DIAGNOSIS — M25.562 LEFT KNEE PAIN, UNSPECIFIED CHRONICITY: ICD-10-CM

## 2025-03-09 LAB
HOLD SPECIMEN: NORMAL

## 2025-03-09 PROCEDURE — 99283 EMERGENCY DEPT VISIT LOW MDM: CPT

## 2025-03-09 PROCEDURE — 250N000013 HC RX MED GY IP 250 OP 250 PS 637: Performed by: EMERGENCY MEDICINE

## 2025-03-09 PROCEDURE — 250N000011 HC RX IP 250 OP 636: Performed by: EMERGENCY MEDICINE

## 2025-03-09 PROCEDURE — 73562 X-RAY EXAM OF KNEE 3: CPT | Mod: LT

## 2025-03-09 RX ORDER — ONDANSETRON 4 MG/1
4 TABLET, ORALLY DISINTEGRATING ORAL ONCE
Status: COMPLETED | OUTPATIENT
Start: 2025-03-09 | End: 2025-03-09

## 2025-03-09 RX ORDER — HYDROXYZINE HYDROCHLORIDE 25 MG/1
25 TABLET, FILM COATED ORAL ONCE
Status: COMPLETED | OUTPATIENT
Start: 2025-03-09 | End: 2025-03-09

## 2025-03-09 RX ADMIN — HYDROXYZINE HYDROCHLORIDE 25 MG: 25 TABLET, FILM COATED ORAL at 21:28

## 2025-03-09 RX ADMIN — HYDROMORPHONE HYDROCHLORIDE 1 MG: 2 TABLET ORAL at 20:41

## 2025-03-09 RX ADMIN — ONDANSETRON 4 MG: 4 TABLET, ORALLY DISINTEGRATING ORAL at 21:28

## 2025-03-09 ASSESSMENT — ACTIVITIES OF DAILY LIVING (ADL)
ADLS_ACUITY_SCORE: 67

## 2025-03-10 NOTE — ED TRIAGE NOTES
Pt arrives via EMS. Pt has concerns about 10/10 knee pain that has been going on for aabout two weeks- she received an xray and was in process of getting an MRI done- however pt had more pain expressed after today.

## 2025-03-10 NOTE — DISCHARGE INSTRUCTIONS
It was a pleasure taking care of you today. I hope you feel much better soon.  Your x-ray was reassuring.  Please follow-up with your orthopedic doctor as scheduled.  Return immediately for worse pain, fever, or any other concerns.

## 2025-03-10 NOTE — ED PROVIDER NOTES
History     Chief Complaint:  Left knee pain        HPI   Nevin Alvarado is a 34 year old female on Eliquis for DVT with history of chronic pain syndrome who presents with subacute left knee pain.  The patient has had left knee pain for the last 2 to 3 weeks in the absence of fall or trauma.  She has been previously evaluated by [facility] with negative x-ray but continues to have discomfort.  She notes that she has an MRI scheduled in 5 days but would like to have this done today.  She denies new injury or trauma, fevers, chills, redness, swelling, or any other concerns.        Independent Historian:   None    Review of External Notes:   Reviewed 3/3/2025 family medicine visit for comprehensive review of past medical history    ROS:  Review of Systems    Allergies:  Influenza Vaccines  Latex  Oseltamivir  Penicillins  Vancomycin  Hydrocodone  Blood-Group Specific Substance  Clavulanic Acid  Haemophilus B Polysaccharide Vaccine  Iodinated Contrast Media  Iodine  Oxycodone  Sulfamethoxazole  Trimethoprim  Adhesive Tape  Band-Aid Anti-Itch  Cephalosporins  Lamotrigine  Naltrexone     Medications:    albuterol (PROAIR HFA/PROVENTIL HFA/VENTOLIN HFA) 108 (90 Base) MCG/ACT inhaler  albuterol (PROVENTIL) (2.5 MG/3ML) 0.083% neb solution  amitriptyline (ELAVIL) 25 MG tablet  apixaban ANTICOAGULANT (ELIQUIS) 5 MG tablet  cetirizine (ZYRTEC) 10 MG tablet  Cholecalciferol (D3 HIGH POTENCY) 25 MCG (1000 UT) CAPS  clonazePAM (KLONOPIN) 0.5 MG tablet  cyanocobalamin (VITAMIN B-12) 1000 MCG tablet  ferrous sulfate (FEROSUL) 325 (65 Fe) MG tablet  hydrOXYzine HCl (ATARAX) 25 MG tablet  MOUNJARO 5 MG/0.5ML SOAJ  norethindrone (AYGESTIN) 5 MG tablet  polyethylene glycol (MIRALAX) 17 GM/Dose powder  pregabalin (LYRICA) 100 MG capsule  Prenatal Vit-Fe Fumarate-FA (PRENATAL MULTIVITAMIN W/IRON) 27-0.8 MG tablet  PSYLLIUM PO  Semaglutide, 1 MG/DOSE, (OZEMPIC) 4 MG/3ML pen  Sodium Phosphates (FLEET ENEMA) ENEM  tiZANidine (ZANAFLEX)  4 MG tablet        Past History:    Past Medical History:   Diagnosis Date    ADD (attention deficit disorder)     Anorexia nervosa with bulimia     Anxiety     Asthma     Borderline personality disorder     Depression     Diabetes mellitus     Eating disorder     h/o Suicide attempt 02/21/2018    History of pulmonary embolism 12/2019    Neuropathy     Obesity     PTSD (post-traumatic stress disorder)     Pulmonary embolism     Rectal foreign body - Recurrent issue, self placed     Self-injurious behavior     Sleep apnea     Suicidal attempt by overdose, h/o     Swallowed foreign body - Recurrent issue, self placed          Physical Exam     Patient Vitals for the past 24 hrs:  Vitals:    03/09/25 2030 03/09/25 2037 03/09/25 2045 03/09/25 2052   BP:       Pulse:       Resp:       Temp:       TempSrc:       SpO2: 98% 98% 98% 98%         Physical Exam  Constitutional: Alert, attentive  HENT:    Nose: Nose normal.   CV: 2+ DP and PT pulses, brisk distal cap refill  Chest: Effort normal and breath sounds normal.   GI:  There is no tenderness. No distension. Normal bowel sounds  MSK: Normal inspection to the left lower extremity.  Vague anterior tenderness.  Extension function intact to the left knee.  No obvious effusion but exam limited by habitus.  No asymmetric erythema or warmth.  Soft compartments throughout.  No pain out of proportion to exam.  Neurological: Alert, attentive  5/5 strength to the DF, PF, EHL and FHL motor functions; sensation intact to the DP, SP, T, S and S distributions  Skin: Skin is warm and dry.      Emergency Department Course       Results for orders placed or performed during the hospital encounter of 03/09/25   XR Knee Left 3 Views     Status: None    Narrative    EXAM: XR KNEE LEFT 3 VIEWS  LOCATION: [facility]  DATE: 3/9/2025    INDICATION: knee pain  COMPARISON: 3/1/2025       Impression    IMPRESSION: Small lateral and patellar marginal osteophytes. No joint  space narrowing. No acute fracture or joint effusion.    [place] Draw     Status: None    Narrative    The following orders were created for panel order [place] Draw.  Procedure                               Abnormality         Status                     ---------                               -----------         ------                     Extra Blue Top Tube[[id number]]                             Final result               Extra Red Top Tube[[id number]]                              Final result               Extra Green Top (Lithiu...[[id number]]                      Final result               Extra Purple Top Tube[[id number]]                           Final result                 Please view results for these tests on the individual orders.   Extra Blue Top Tube     Status: None   Result Value Ref Range    Hold Specimen JIC    Extra Red Top Tube     Status: None   Result Value Ref Range    Hold Specimen JIC    Extra Green Top (Lithium Heparin) Tube     Status: None   Result Value Ref Range    Hold Specimen JIC    Extra Purple Top Tube     Status: None   Result Value Ref Range    Hold Specimen JIC        Emergency Department Course & Assessments:             Interventions:  Medications   HYDROmorphone (DILAUDID) half-tab 1 mg (1 mg Oral $Given 3/9/25 2041)   ondansetron (ZOFRAN ODT) ODT tab 4 mg (4 mg Oral $Given 3/9/25 2128)   hydrOXYzine HCl (ATARAX) tablet 25 mg (25 mg Oral $Given 3/9/25 2128)         Independent Interpretation (X-rays, CTs, rhythm strip):  No fracture on x-ray left knee      Disposition:  The patient was discharged to home.     Impression & Plan      Medical Decision Making:  This is a 34-year-old female with history of chronic pain syndrome with recent left knee pain issues presents for further evaluation.  I discussed that we cannot offer an MRI of her left knee this evening.  X-ray shows no acute abnormality.  Exam shows no evidence of compartment syndrome, inflammatory or septic arthritis,  or other acute concern.  Strongly doubt PE given continued compliance with Eliquis.  Plan discharge home with plan for outpatient follow-up with [facility] as scheduled.  Return precautions for any concerns.        Diagnosis:  Visit Diagnosis, Associated Orders, and Comments     ICD-10-CM    1. Left knee pain, unspecified chronicity  M25.562              Siddharth Castellano MD  03/09/25 [id number]

## 2025-04-07 NOTE — PROGRESS NOTES
Emergency Social Work Services Note    Date of  Intervention: 09/18/20  Last Emergency Department Visit:  9/9/2020  Care Plan:  -  Collaborated with:  Clara Perez, Mental Health  with [employer], ([phone]); ED RN; Chart review    Data:  Nevin is a 29 year-old female with extensive mental health history including ongoing self-injurious behaviors and multiple medical interventions related to ingestion of foreign bodies.  Nevin presented to the Emergency Department tonight initially to seek support surrounding the urge to self-harm and she subsequently ingested a paper clip while in the ED lobby.  ALLAN consulted by Nevin's Mental Health  with [employer] to update regarding her presentation today and over the past week.      Intervention:  ALLAN received voicemail from Clara, Mental Health  with [facility], ([phone]).  Clara reports that she was informed that Nevin did self-harm after arriving to the ED by swallowing a paper clip.  Clara states that Lisas mental health symptoms have continued to escalate this week including increased depression and increased urges for self-harm.  As of today, it was clear that Nevin was unable to remain safe at home.  Clara states that [facility] has begun looking into guardianship for Nevin again (she has had a guardian in the past).  Conversations regarding guardianship are adding increased stress for Nevin and further escalating her mental health symptoms.     Clara is hoping she can be involved with a treatment plan to help Nevin remain safe through the weekend.  ALLAN updated Clara re: DEC Assessment completion and current plan for behavioral health admission.  Clara will return to work on Monday and available for support and care coordination at that time.     Assessment:  ALLAN did not meet with Nevin for direct assessment.  Strong system of community mental health supports in  Spoke to mom at the field.  Possible fracture of some sort or muscle tear.    Unable to do any special tests due to pain.    place.    Plan:    Anticipated Disposition:  Behavioral Health Admit    Barriers to d/c plan:  None identified. Patient is agreeable to admission.     Follow Up:   team will continue to follow.     YORDAN Oliveros, MSW  Social Work Services, Emergency Dept [facility]  Pager: [phone] Mon-Sat 9 am - 9 pm, on-call/after hours pager [phone]

## 2025-04-14 ENCOUNTER — HOSPITAL ENCOUNTER (EMERGENCY)
Facility: CLINIC | Age: 35
Discharge: HOME | End: 2025-04-15
Attending: EMERGENCY MEDICINE | Admitting: EMERGENCY MEDICINE
Payer: COMMERCIAL

## 2025-04-14 DIAGNOSIS — N32.89 BLADDER SPASM: ICD-10-CM

## 2025-04-14 DIAGNOSIS — G89.29 CHRONIC LEFT HIP PAIN: ICD-10-CM

## 2025-04-14 DIAGNOSIS — M25.552 CHRONIC LEFT HIP PAIN: ICD-10-CM

## 2025-04-14 PROCEDURE — 51798 US URINE CAPACITY MEASURE: CPT

## 2025-04-14 PROCEDURE — 99284 EMERGENCY DEPT VISIT MOD MDM: CPT | Mod: 25

## 2025-04-14 RX ORDER — HYDROMORPHONE HYDROCHLORIDE 2 MG/1
2 TABLET ORAL ONCE
Status: COMPLETED | OUTPATIENT
Start: 2025-04-14 | End: 2025-04-15

## 2025-04-14 RX ORDER — METHOCARBAMOL 750 MG/1
750 TABLET, FILM COATED ORAL ONCE
Status: COMPLETED | OUTPATIENT
Start: 2025-04-14 | End: 2025-04-15

## 2025-04-14 ASSESSMENT — ACTIVITIES OF DAILY LIVING (ADL): ADLS_ACUITY_SCORE: 67

## 2025-04-15 VITALS
HEART RATE: 91 BPM | HEIGHT: 62 IN | WEIGHT: 226 LBS | SYSTOLIC BLOOD PRESSURE: 132 MMHG | DIASTOLIC BLOOD PRESSURE: 85 MMHG | RESPIRATION RATE: 16 BRPM | OXYGEN SATURATION: 97 % | BODY MASS INDEX: 41.59 KG/M2 | TEMPERATURE: 98.7 F

## 2025-04-15 LAB
ALBUMIN UR-MCNC: NEGATIVE MG/DL
APPEARANCE UR: CLEAR
BACTERIA #/AREA URNS HPF: ABNORMAL /HPF
BILIRUB UR QL STRIP: NEGATIVE
COLOR UR AUTO: ABNORMAL
GLUCOSE UR STRIP-MCNC: NEGATIVE MG/DL
HGB UR QL STRIP: NEGATIVE
KETONES UR STRIP-MCNC: NEGATIVE MG/DL
LEUKOCYTE ESTERASE UR QL STRIP: NEGATIVE
NITRATE UR QL: NEGATIVE
PH UR STRIP: 6 [PH] (ref 5–7)
RBC URINE: 1 /HPF
SP GR UR STRIP: 1.01 (ref 1–1.03)
SQUAMOUS EPITHELIAL: <1 /HPF
UROBILINOGEN UR STRIP-MCNC: NORMAL MG/DL
WBC URINE: 1 /HPF

## 2025-04-15 PROCEDURE — 81001 URINALYSIS AUTO W/SCOPE: CPT | Performed by: EMERGENCY MEDICINE

## 2025-04-15 PROCEDURE — 51798 US URINE CAPACITY MEASURE: CPT | Mod: 25

## 2025-04-15 PROCEDURE — 250N000013 HC RX MED GY IP 250 OP 250 PS 637: Performed by: EMERGENCY MEDICINE

## 2025-04-15 RX ORDER — PHENAZOPYRIDINE HYDROCHLORIDE 200 MG/1
200 TABLET, FILM COATED ORAL 3 TIMES DAILY PRN
Qty: 8 TABLET | Refills: 0 | Status: SHIPPED | OUTPATIENT
Start: 2025-04-15

## 2025-04-15 RX ORDER — PHENAZOPYRIDINE HYDROCHLORIDE 100 MG/1
200 TABLET, FILM COATED ORAL ONCE
Status: COMPLETED | OUTPATIENT
Start: 2025-04-15 | End: 2025-04-15

## 2025-04-15 RX ADMIN — PHENAZOPYRIDINE 200 MG: 100 TABLET ORAL at 01:20

## 2025-04-15 RX ADMIN — METHOCARBAMOL 750 MG: 750 TABLET ORAL at 00:03

## 2025-04-15 RX ADMIN — HYDROMORPHONE HYDROCHLORIDE 2 MG: 2 TABLET ORAL at 00:03

## 2025-04-15 ASSESSMENT — ACTIVITIES OF DAILY LIVING (ADL): ADLS_ACUITY_SCORE: 67

## 2025-04-15 NOTE — ED PROVIDER NOTES
Emergency Department Note      History of Present Illness     Chief Complaint   Hip Pain      HPI   Nevin Alvarado is a 34 year old female on [facility] with a history of PE, asthma, neuropathy and diabetes mellitus presenting with hip pain. The patient reports having severe left hip pain shooting down her leg and up her buttock, along with groin pain noting associated urinary frequency. She began having symptoms around December, suspecting she had strained a muscle while she was packing to move apartments; her pain resolved, but came back a couple of weeks ago. Due to persist pain, she was prompted to go into the [facility] urgent [facility]; she got an MRI on Saturday which resulted in a diagnosis of hip dysplasia and labral tear as well as CAM deformity. She has been unable to sleep, stand or walk due to pain. She last had a dose of tylenol and tizanidine at 2030. Patient had a knee arthroscopy about 4 weeks ago, stating she has been taking dilaudid with no relief.  She has also been taking muscle relaxers to try and control the pain in order to sleep. She is on her last day of prednisone, prescribed for her pain. Patient denies dysuria, incontinence, fevers, emesis, diarrhea or constipation.  She denies any weakness or numbness in the lower extremities and denies saddle anesthesia.    Independent Historian   None    Review of External Notes   I reviewed the patient's care plan.  Patient has long history of intentional self-harm including swallowing foreign bodies and foreign bodies in the rectum.  She has also been seen for chronic abdominal pain and other pain related complaints.    Past Medical History     Medical History and Problem List   ADD (attention deficit disorder)  Anorexia nervosa with bulimia  Anxiety  Asthma  Borderline personality disorder  Depression  Diabetes mellitus  Neuropathy  Obesity  PTSD (post-traumatic stress disorder)  Pulmonary embolism  Self-injurious behavior  Sleep apnea    Medications  "  Albuterol   Amitriptyline  Apixaban   Cetirizine   Clonazepam   Hydroxyzine   Norethindrone   Pregabalin  Semaglutide  Tizanidine   Valacyclovir     Surgical History   Abdominal surgery   Right knee surgery  Laparoscopic ablation endometriosis  Carpal tunnel release  Lymph nodes removed from neck; benign  Mammoplasty reduction      Physical Exam     Patient Vitals for the past 24 hrs:   BP Temp Temp src Pulse Resp SpO2 Height Weight   04/15/25 0120 -- -- -- -- -- 97 % -- --   04/15/25 0119 -- -- -- -- -- 96 % -- --   04/15/25 0118 -- -- -- -- -- 97 % -- --   04/15/25 0117 -- -- -- -- -- 97 % -- --   04/15/25 0107 132/85 -- -- 91 -- 100 % -- --   04/14/25 2231 (!) 125/92 98.7  F (37.1  C) Oral 101 16 99 % 1.575 m (5' 2\") 102.5 kg (226 lb)     Physical Exam  Vitals and nursing note reviewed.   Constitutional:       General: She is not in acute distress.     Appearance: She is not ill-appearing.   HENT:      Head: Normocephalic and atraumatic.      Right Ear: External ear normal.      Left Ear: External ear normal.      Nose: Nose normal.      Mouth/Throat:      Mouth: Mucous membranes are moist.   Eyes:      Extraocular Movements: Extraocular movements intact.      Conjunctiva/sclera: Conjunctivae normal.   Cardiovascular:      Rate and Rhythm: Normal rate and regular rhythm.      Heart sounds: No murmur heard.  Pulmonary:      Effort: Pulmonary effort is normal. No respiratory distress.      Breath sounds: Normal breath sounds. No wheezing, rhonchi or rales.   Abdominal:      General: Abdomen is flat. Bowel sounds are normal. There is no distension.      Palpations: Abdomen is soft.      Tenderness: There is no abdominal tenderness. There is no guarding or rebound.   Musculoskeletal:         General: No deformity or signs of injury.      Cervical back: Normal range of motion and neck supple.      Lumbar back: No tenderness.      Left hip: Tenderness present. No deformity. Normal range of motion.   Skin:     " General: Skin is warm and dry.      Findings: No rash.   Neurological:      Mental Status: She is alert and oriented to person, place, and time.      Sensory: No sensory deficit.      Motor: No weakness.   Psychiatric:         Behavior: Behavior normal.           Diagnostics     Lab Results   Labs Ordered and Resulted from Time of ED Arrival to Time of ED Departure   ROUTINE UA WITH MICROSCOPIC REFLEX TO CULTURE - Abnormal       Result Value    Color Urine Light Yellow      Appearance Urine Clear      Glucose Urine Negative      Bilirubin Urine Negative      Ketones Urine Negative      Specific Gravity Urine 1.015      Blood Urine Negative      pH Urine 6.0      Protein Albumin Urine Negative      Urobilinogen Urine Normal      Nitrite Urine Negative      Leukocyte Esterase Urine Negative      Bacteria Urine Few (*)     RBC Urine 1      WBC Urine 1      Squamous Epithelials Urine <1         Imaging   No orders to display     Independent Interpretation   None    ED Course      Medications Administered   Medications   methocarbamol (ROBAXIN) tablet 750 mg (750 mg Oral $Given 4/15/25 0003)   HYDROmorphone (DILAUDID) tablet 2 mg (2 mg Oral $Given 4/15/25 0003)   phenazopyridine (PYRIDIUM) tablet 200 mg (200 mg Oral $Given 4/15/25 0120)       Procedures   Procedures     Discussion of Management   None    ED Course   ED Course as of 04/15/25 0400   Mon Apr 14, 2025   2328 I obtained the history and examined the patient as noted above.     e Apr 15, 2025   0036 Pre void bladder scan 65 mL, post void bladder scan 5 mL   0105 I rechecked the patient and explained findings; I discharged her.        Additional Documentation  None    Medical Decision Making / Diagnosis     CMS Diagnoses: None    MIPS       None    MDM   Nevin Alvarado is a 34 year old female who presents with ongoing left hip pain.  She had an MRI 2 days ago which showed several abnormalities including labral tear.  Her pain is not well-controlled  today.  She has Ortho follow-up, but not until next week.  She has primary care follow-up appointment tomorrow.  I suspect that her pain is all related to her findings on her MRI.  Lumbar radiculopathy is also possible, but the patient has no neurologic symptoms or deficits on exam.  She complains of urinary frequency, but a postvoid bladder scan shows no signs of urinary retention.  She has not had any fevers or other symptoms concerning for epidural abscess.  She has no red flags for cauda equina syndrome.  She was given oral pain medications with some improvement.  Given that she has primary care appointment tomorrow and does have an ED care plan, I will defer additional pain management decisions to her primary care provider.  We will try Pyridium to help with the bladder symptoms that she is having.  We discussed return precautions.    Disposition   The patient was discharged.     Diagnosis     ICD-10-CM    1. Chronic left hip pain  M25.552     G89.29       2. Bladder spasm  N32.89            Discharge Medications   Discharge Medication List as of 4/15/2025  1:10 AM        START taking these medications    Details   phenazopyridine (PYRIDIUM) 200 MG tablet Take 1 tablet (200 mg) by mouth 3 times daily as needed for irritation., Disp-8 tablet, R-0, E-Prescribe           Scribe Disclosure:  I, Aura Saleem, am serving as a scribe at 11:38 PM on 4/14/2025 to document services personally performed by Dl Ambrocio MD based on my observations and the provider's statements to me.        Dl Ambrocio MD  04/15/25 0402

## 2025-04-15 NOTE — PROGRESS NOTES
"Pertinent assessments: A&Ox4, VSS. On RA. Denied pain or discomfort. Independent with cares. SOB with activities. LS clear. Hgb of 6.7. 1 unit of blood transfusing. Rash on upper & lower extremities. Pt, stated she has been having them for a week now. Afebrile     Major Shift Events admitted to the floor. Blood transfusing     Treatment Plan: IVF, Neutropenic & contact precautions, IV Abx, monitor fever, Hemo/Onc     Goal Outcome Evaluation:  Plan of Care Reviewed With: patient  Overall Patient Progress: no changeOverall Patient Progress: no change  Outcome Evaluation: Blood transfusing  Problem: Adult Inpatient Plan of Care  Goal: Plan of Care Review  Description: The Plan of Care Review/Shift note should be completed every shift.  The Outcome Evaluation is a brief statement about your assessment that the patient is improving, declining, or no change.  This information will be displayed automatically on your shiftnote.  Outcome: Progressing  Flowsheets (Taken 4/15/2025 1556)  Outcome Evaluation: Blood transfusing  Plan of Care Reviewed With: patient  Overall Patient Progress: no change  Goal: Patient-Specific Goal (Individualized)  Description: You can add care plan individualizations to a care plan. Examples of Individualization might be:  \"Parent requests to be called daily at 9am for status\", \"I have a hard time hearing out of my right ear\", or \"Do not touch me to wake me up as it startlesme\".  Outcome: Progressing  Goal: Absence of Hospital-Acquired Illness or Injury  Outcome: Progressing  Intervention: Identify and Manage Fall Risk  Recent Flowsheet Documentation  Taken 4/15/2025 1105 by Meghana Enrique RN  Safety Promotion/Fall Prevention: safety round/check completed  Intervention: Prevent Skin Injury  Recent Flowsheet Documentation  Taken 4/15/2025 1105 by Meghana Enrique RN  Body Position: position changed independently  Intervention: Prevent and Manage VTE (Venous Thromboembolism) Risk  Recent Flowsheet " Emergency Social Work Services Note    Date of  Intervention: 08/04/18  Last Emergency Department Visit:  07/30/18  Care Plan:  Initiated in September 2017 in chart, but unclear if it is active and accurate  Collaborated with:  ED Obs NP, chart review, community supports    Data:  Pt is a 28 yo female who presented to ED for eval upon swallowing a foreign object.  Pt has had multiple ED admissions (about 12 in total at [facility] and hospitals), and SW received referral from ED Obs NP to initiate a care plan for pt.    Intervention:  SW completed chart review and made multiple calls to pt's community supports in attempt to support pt in engaging with her already established community support system.  Pt appears to have a psychiatrist, ILS worker, , CADI CM, and Mental Health CM.    Called [facility], where pt was last seen by psychiatrist Dr. Brionna De La Rosa on 6.11.18 per chart review.  Chart review indicates that pt had follow up appointments scheduled for 7.17.18 and 7.24.18 and missed both appointments.  Verified that pt does have appointment scheduled for 8.9.18 with Dr. De La Rosa, but [facility] was unable to provide time to this SW per policy.  Per chart review pt has been reminded of this appointment via phone when she called for prescription refills, and SW wrote appointment reminder in pt's AVS.    SW called pt's CADI CM, Valencia Higgins ([phone] - Supervisor at [place] per VM), and left  for care coordination and requested support in developing a plan on how best to support pt and decrease frequency of ED visits.  ALLAN also called pt's  CM at [facility]. Becca Neves ([phone]), and left  requesting a return call.  In past notes, it was indicated that Becca was in the process of helping pt to move into a supportive living environment with full staffing support.  Pt is currently living alone in an apartment.    Per chart review, pt was referred to  Documentation  Taken 4/15/2025 1105 by Meghana Enrique RN  VTE Prevention/Management: compression stockings off  Intervention: Prevent Infection  Recent Flowsheet Documentation  Taken 4/15/2025 1105 by Meghana Enrique RN  Infection Prevention:   environmental surveillance performed   hand hygiene promoted   personal protective equipment utilized   single patient room provided  Goal: Optimal Comfort and Wellbeing  Outcome: Progressing  Intervention: Monitor Pain and Promote Comfort  Recent Flowsheet Documentation  Taken 4/15/2025 0955 by Meghana Enrique RN  Pain Management Interventions:   declines   cold applied  Intervention: Provide Person-Centered Care  Recent Flowsheet Documentation  Taken 4/15/2025 1105 by Meghana Enrique RN  Trust Relationship/Rapport: care explained  Goal: Readiness for Transition of Care  Outcome: Progressing  Intervention: Mutually Develop Transition Plan  Recent Flowsheet Documentation  Taken 4/15/2025 1001 by Meghana Enrique RN  Equipment Currently Used at Home: none     Problem: Delirium  Goal: Optimal Coping  Outcome: Progressing  Intervention: Optimize Psychosocial Adjustment to Delirium  Recent Flowsheet Documentation  Taken 4/15/2025 1105 by Meghana Enrique RN  Family/Support System Care: involvement promoted  Goal: Improved Behavioral Control  Outcome: Progressing  Intervention: Minimize Safety Risk  Recent Flowsheet Documentation  Taken 4/15/2025 1105 by Meghana Enrique RN  Trust Relationship/Rapport: care explained  Goal: Improved Attention and Thought Clarity  Outcome: Progressing  Goal: Improved Sleep  Outcome: Progressing  Intervention: Promote Sleep  Recent Flowsheet Documentation  Taken 4/15/2025 1105 by Meghana Enrique RN  Sleep/Rest Enhancement: awakenings minimized     Problem: Fever with Neutropenia  Goal: Baseline Body Temperature  Outcome: Progressing  Goal: Absence of Infection  Outcome: Progressing  Intervention: Prevent Infection and Maximize Resistance  Recent Flowsheet  therapist, Tao Altamirano PsyD with [facility] ([phone]) by DEC  at [place] on 6.10.18.  Pt was also referred to CBT and DBT certified therapist, YORDAN Ludwig ([phone]) by Harry on 18.  Unclear if pt followed-up with either of these therapists.  Chart review also indicates that pt was on a mental health commitment for two years, and it  in 2017.    Pt also appears to have  support and ILS worker (contact information unknown at this time), and is connected with the [facility] 24-hour crisis team.      Assessment:  Pt appears to be well connected to community supports and MH specialists, however her behaviors indicate a higher level of support and intervention needed with particular regard to impulse control.    Plan:    Anticipated Disposition:  Home with services    Barriers to d/c plan:  None identified; pt can use [employer] ([phone]) for transportation as needed.    Follow Up:  ALLAN will continue to reach out to pt's community supports in an effort to collaborate and determine appropriate plan when pt seeks care in ED.  ALLAN did initiate request for care plan for pt with [facility].    Addendum:  ALLAN informed by NP that pt is requesting assistance with transportation to return home.  ALLAN called [employer] ([phone]) on pt's behalf since after hours to request transport for hospital discharge.  Request has been made for pickup from hospital at 1900.  Called [employer] x 2 and unable to find transporter for this ride, and requesting that SW callback in 1-2 hours, but no guarantee of transport at that time.  D/t pt being ready for discharge now, ALLAN arranged for cab ride with [employer] ([phone]) at 1905 and requested next available cab.  Pt will be picked up at main entrance and transported directly to home address; 6th floor RN notified and verified that pt ready for discharge.    YORDAN Prakash  Emergency Department   Phone:  [phone]  Pager: [phone]  On-call pager: [phone] (1600 to midnight)   Documentation  Taken 4/15/2025 1105 by Meghana Enrique, RN  Infection Prevention:   environmental surveillance performed   hand hygiene promoted   personal protective equipment utilized   single patient room provided  Oral Care:   teeth brushed   tongue brushed     Problem: Anemia  Goal: Anemia Symptom Improvement  Outcome: Progressing  Intervention: Monitor and Manage Anemia  Recent Flowsheet Documentation  Taken 4/15/2025 1105 by Meghana Enrique, RN  Safety Promotion/Fall Prevention: safety round/check completed

## 2025-04-15 NOTE — ED TRIAGE NOTES
Left hip, buttock pain as well as groin discomfort. Symptoms for about 4 months. [facility] diagnosed hip dysplasia and MRI. (Report in room).     Pt here today because pain has been worse today. Able to walk to bed with walker.

## 2025-05-05 NOTE — ED NOTES
Bed: ED17  Expected date:   Expected time:   Means of arrival:   Comments:  Karthik Alvarado   [Palliative Care Plan] : not applicable at this time [FreeTextEntry1] : Johnny Bland is a 63-year-old male with hypertension and diabetes who has atherosclerosis, and this is the leading risk factor for CVA multiple in 2023 and 2024.  NO evidence on testing of a hereditary or acquired predisposition to clotting tendency. post hospitalization [facility] in June 2024.; and laboratory results up to 08/2024 were discussed with him as below. Duplex US Imaging of the right and left legs ordered for 27 August 2024. Follow up in our office is scheduled. Patient will remain on antihypertension medication renewal of the amlodipine apixaban iron and vitamin C. On 05/02/2025 he is recovering from lipoma surgery. There has been bleeding on chronic use of aspirin and low dose apixaban 2.56 mg PO BID. Bleeding now controlled and he may take oral iron to make up for any iron and blood loss. bandage on examination is dry and he was recently wrapped before our office visit.  Keep anticoagulation Aspirin 81 mg and apixaban 2.5 mg PO BID for stroke and DVT prevention. RTC in 6 months

## 2025-05-07 ENCOUNTER — HOSPITAL ENCOUNTER (EMERGENCY)
Facility: CLINIC | Age: 35
Discharge: LEFT BEFORE DISCHARGE | End: 2025-05-07
Admitting: EMERGENCY MEDICINE
Payer: COMMERCIAL

## 2025-05-07 VITALS
OXYGEN SATURATION: 99 % | TEMPERATURE: 98.4 F | WEIGHT: 228.4 LBS | SYSTOLIC BLOOD PRESSURE: 148 MMHG | HEART RATE: 99 BPM | BODY MASS INDEX: 41.77 KG/M2 | DIASTOLIC BLOOD PRESSURE: 108 MMHG | RESPIRATION RATE: 16 BRPM

## 2025-05-07 LAB
FLUAV RNA SPEC QL NAA+PROBE: NEGATIVE
FLUBV RNA RESP QL NAA+PROBE: NEGATIVE
RSV RNA SPEC NAA+PROBE: NEGATIVE
S PYO DNA THROAT QL NAA+PROBE: NOT DETECTED
SARS-COV-2 RNA RESP QL NAA+PROBE: NEGATIVE

## 2025-05-07 PROCEDURE — 99281 EMR DPT VST MAYX REQ PHY/QHP: CPT | Performed by: EMERGENCY MEDICINE

## 2025-05-07 PROCEDURE — 87637 SARSCOV2&INF A&B&RSV AMP PRB: CPT | Performed by: EMERGENCY MEDICINE

## 2025-05-07 PROCEDURE — 87651 STREP A DNA AMP PROBE: CPT | Performed by: EMERGENCY MEDICINE

## 2025-05-07 ASSESSMENT — ACTIVITIES OF DAILY LIVING (ADL): ADLS_ACUITY_SCORE: 67

## 2025-05-08 NOTE — ED TRIAGE NOTES
BIBA for esophageal burning and general flu symptoms since Sunday. Denies sick contacts. VSS for ems.

## 2025-05-12 NOTE — PROGRESS NOTES
"Video-Visit Details    Type of service:  Video Visit    Video Start Time: 8:58 AM   Video End Time: 9:15 AM     Originating Location (pt. Location): Home    Distant Location (provider location): Offsite (providers home) [facility] [place]     Platform used for Video Visit: Thuy    Return Nutrition Consultation Note    Reason For Visit: Nutrition Assessment    Nevin Alvarado is a 34 year old presenting today for return nutrition medical weight management consult.  Patient is accompanied by self.     Pt referred by Sharon Toro NP on September 12, 2023.  CO-MORBIDITIES OF OBESITY INCLUDE:        9/12/2023    12:48 PM   --   I have the following health issues associated with obesity Type II Diabetes    High Blood Pressure    Sleep Apnea    Polycystic Ovarian Syndrome    GERD (Reflux)    Fatty Liver    Asthma    Stress Incontinence     SUPPORT:      9/12/2023    12:48 PM   Support System Reviewed With Patient   Who do you have in your support network that can be available to help you for the first 2 weeks after surgery? I live in a group home with 24 hour staff, mom   Who can you count on for support throughout your weight loss surgery journey? a friend, and my therapist     ANTHROPOMETRICS:  Initial Consult Weight: 309 lb on 9/13/23  Estimated body mass index is 41.15 kg/m  as calculated from the following:    Height as of this encounter: 1.575 m (5' 2\").    Weight as of this encounter: 102.1 kg (225 lb).  Current Weight: 225 lb per pt report, -84 lb since initial visit (27% of bodyweight)     Wt Readings from Last 5 Encounters:   05/07/25 103.6 kg (228 lb 6.3 oz)   04/14/25 102.5 kg (226 lb)   03/05/25 103.4 kg (228 lb)   02/27/25 103.4 kg (228 lb)   02/18/25 103.4 kg (228 lb)         9/12/2023    12:48 PM   --   I have tried the following methods to lose weight Watching portions or calories    Exercise    Pre packaged meals ex: Nutrisystem    OTC Medications    Prescription " Medications         9/12/2023    12:48 PM   Weight Loss Questions Reviewed With Patient   How long have you been overweight? Since late teens through early 20's     MEDICATION FOR WEIGHT LOSS:  Mounjaro - 5 mg     Hx of self harm- swallowing thing- started after she was treated for anorexia when taking topiramate- in 6 months has only had one day of self harm- this was 2 weeks ago and instead of swallowing she inserted something per rectum. - Followed by MASON Potter PA-C, PE in 2019 after esophageal perforation repair     VITAMIN/MINERAL SUPPLEMENTS:  Iron, Vitamin B12, Vitamin D     NUTRITION HISTORY:  Food allergies:NKFA  Food intolerances: None  Previous experience with diet modification for weight loss: Nutrisystem, prescription medications, exercise, watching calories or portions     Has been meal planning for the past 3 months. Likes chicken, turkey, shrimp.      October 2023: Eating 3 meals per day. Lunch and dinner meals have been chili or mediterranean orzo salad with artichokes more recently. Drinks mostly water, Cup of coffee, [employer]. Doesn't drink soda or juice. Uses a walker for neuropathy. Walking around more often/standing longer which has been an improvement.     November 2023: Reports she has been sick a lot recently. Sometimes feels she has to force herself to eat. Currently has a cold. Working with a GI doctor right now also as she has been having some GI symptoms after eating certain foods. Reports she had another self harm episode unfortunately, feels this will delay her chances for surgery.     January 2024: Had been dealing with illness recently which set her back a little bit on her goals. Has been using Wegovy. Feels since starting the Wegovy her face gets puffy or red blotches on it, plans to keep a close eye on it before deciding to stop. Had been eating very well but finding more temptations now. Feels she is not cooking as often and doing more frozen meals.  Hasn't  been using walker for a few weeks.     April 2024: Since visiting Sharon nutrition has been off and on since she was in the hospital and multiple trips to the ED. Currently being treated for pneumonia, PE, depression and anxiety. Reports she continues to have constipation/diarrhea on and off. Has Miralax as needed. Also has enema prescription also. Nutrition has been very challenging over the last month due to being in the hospital for 15 days. She did continue to do her Wegovy injections while in the hospital. Plan is to focus on getting healthy and feeling better then will work on her good eating habits again.      June 2024: Things have been going a lot better for patient since last visit. She switched from Wegovy to Ozempic over the last month. Eating 2-3 meals a day. Bowel habits have improved since last RD visit. Physical activity has gotten a lot better with the weight loss. Does not use walker anymore. Walking around a lot. Also spoke with a diabetes educator in May.     August 2024: Psychologist recommended patient not have surgery. Patient is ok with this and is happy with her weight loss on AOMs.     Last few months have been harder due to health conditions she has been dealing with and some stress around her guardianship and housing. She is trying her best to eat healthfully but hasn't found much interest in cooking. If her housing situation changes she is going to look into seeing if she can get Mom's Meals. Trying to increase her fluid intake. Will have constipation at times but feels this has gotten a little better since her last trip to the ED.     Physical activity - will walk instead of using the scooter at stores.     Per Sharon Toro NP's recent note: Tolerating ozempic 1mg well. Disinterest in food, nausea/ vomiting and constipation have resolved. Appetite okay. Intake has been limited by new dental issues that have been going on for the last several weeks. She should be done now with dental  procedures so discussed getting back on track with adequate protein and hydration. Stressed importance of not skipping meals, adequate protein and hydration.     November 2024: Moving in 3 weeks. Patient is her own guardian now.  Cooking your own meals. On the Ozempic 1 mg dose. Has been tolerating that dose. Has been dealing with constipation. Eating 3 small meals a day. Prioritizing protein the best she can. Doing better with her fluid intake now that she can drink from straws again. Averaging about 30 oz of water daily.     Physical activity - getting easier. Able to walk more, can get through the stores a lot easier when shopping.     Has a gym in her new apartment building.     January 2025: Patient reports she has been dealing with constipation issues. This ended up in her going to the ED multiple times in the last week. Now taking daily colace and that has been helpful.      In the last month patient moved into her own apartment. She has been struggling financially to afford groceries. Patient sent a [employer] message earlier today with a form from the [facility] that she is hoping the weight management team can fill out to help see if she can obtain some additional support for food.     Encouraged patient to check out my resources that I have included in my not for some food assistance programs in the [facility].     March 2025: Currently in the process for prepping for a colonoscopy. Got some additional support from the [facility] for groceries. Continues to have less motivation to cook for herself. Finding it hard still to afford groceries even with additional help from [facility]. Recently got a [employer] membership and is hopeful she can get some good protein options. Patient bought some protein bars recently, Mosque protein oatmeal, and also made homemade breakfast sandwiches.     Patient is going to look into possibly getting the Fairlife or Premier Protein shakes. Also is going to look into some higher protein frozen  meal options. Previously has done Mom's Meals, she is going to look into the requirements for that program with her [employer] worker.     May 2025: Had surgery on her knee on March 24. Has been recovery well from that. Since last RD visit patient now is getting Mom's Meals delivered weekly. She gets 10 meals every 2 weeks.     Patient will typically eat breakfast but finds she often doesn't have another meal till dinner. She will have snacks occasionally and will have protein bars, protein shake, or protein chips.     Hydration is going well.     Physical therapy - has been going to PT for her knee.     Progress to Previous Goals  1) Continue to eat 3 small meals daily - not consistently met  2) Have a good protein source at all meals - met for the most part   3) Keep up with hydration, aim for 64 oz of fluid daily. - met        9/12/2023    12:48 PM   Recall Diet Questions Reviewed With Patient   Describe what you typically consume for breakfast (typical or most recent) eggs and hash browns, homemade waffles, laith pudding with fruit   Describe what you typically consume for lunch (typical or most recent) homemade lunchables, some pasta or chicken   Describe what you typically consume for supper (typical or most recent) low calorie meal prep meals   Describe what you typically consume as snacks (typical or most recent) cheese sticks, fruit jerky, fruits/vegetables   How many ounces of water, or other low calorie drinks, do you drink daily (8 oz=1 glass)? 48 oz   How many ounces of caffeine (coffee, tea, pop) do you drink daily (8 oz=1 glass)? 16 oz   How many ounces of carbonated (pop, beer, sparkling water) drinks do you drinky daily (8 oz=1 glass)? 0 oz   How many ounces of juice, pop, sweet tea, sports drinks, protein drinks, other sweetened drinks, do you drink daily (8 oz=1 glass)? 0 oz   How many ounces of milk do you drink daily (8 oz=1 glass) 0 oz   How often do you drink alcohol? Never           9/12/2023     12:48 PM   Eating Habits   What foods do you crave? ice cream, chocolate, sometimes just salty chips           9/12/2023    12:48 PM   Dining Out History Reviewed With Patient   How often do you dine out? Rarely.   Where do you dine out? (select all that apply) take out   What types of food do you order when you dine out? jitendra verdin     EXERCISE:        7/20/2024     9:33 AM   --   How often do you exercise? 1 to 2 times per week   What is the duration of your exercise (in minutes)? 15 Minutes   What types of exercise do you do? walking   What keeps you from being more active? Pain     NUTRITION DIAGNOSIS:  Obesity r/t long history of positive energy balance aeb BMI >30 kg/m2.    INTERVENTION:  Intervention Provided/Education Provided on post-op diet guidelines, vitamins/minerals essential post-operatively, GI anatomy of bariatric surgeries, ways to help prepare for post-op diet guidelines pre-operatively, portion/calorie-control, mindful eating and sources of protein.  Patient demonstrates understanding.     Personal barriers to making and continuing required life changes have been identified, and strategies to overcome those barriers have been recommended AND family and social supports have been assessed and strategies to strengthen those supports have been recommended.    Provided pt with list of goals, RD contact information and resources listed below via "[employer]"t.           9/12/2023    12:48 PM   Questions Reviewed With Patient   How ready are you to make changes regarding your weight? Number 1 = Not ready at all to make changes up to 10 = very ready. 10   How confident are you that you can change? 1 = Not confident that you will be successful making changes up to 10 = very confident. 7     Expected Engagement: good    GOALS:  1) Continue to eat 3 small meals daily,may have a protein shake for 1 of these meals.   2) Have a good protein source at all meals  3) Keep up with hydration, aim for 64 oz of fluid  daily.     Physical hunger signs:   - Builds gradually     - Sensation of emptiness in stomach (rumbling)    - Based on biological need    - Goes away when satisfied/full     Psychological hunger signs:   - Occurs/comes on suddenly   - Felt mostly in the head (thoughts, preoccupation, etc)   - Crave specific foods    - Creates a desire to eat more despite fullness    - Independent of time last eaten     Follow Up: as needed.     Time spent with patient: 17 minutes.  Nevin Birmingham RD, LD

## 2025-05-14 ENCOUNTER — VIRTUAL VISIT (OUTPATIENT)
Dept: ENDOCRINOLOGY | Facility: CLINIC | Age: 35
End: 2025-05-14
Payer: COMMERCIAL

## 2025-05-14 VITALS — HEIGHT: 62 IN | WEIGHT: 225 LBS | BODY MASS INDEX: 41.41 KG/M2

## 2025-05-14 DIAGNOSIS — Z71.3 NUTRITIONAL COUNSELING: Primary | ICD-10-CM

## 2025-05-14 DIAGNOSIS — E11.9 TYPE 2 DIABETES MELLITUS WITHOUT COMPLICATION, WITHOUT LONG-TERM CURRENT USE OF INSULIN (H): ICD-10-CM

## 2025-05-14 DIAGNOSIS — E66.9 OBESITY: ICD-10-CM

## 2025-05-14 ASSESSMENT — PAIN SCALES - GENERAL: PAINLEVEL_OUTOF10: NO PAIN (0)

## 2025-05-14 NOTE — NURSING NOTE
Current patient location: home     Is the patient currently in the state of [place]? YES    Visit mode: VIDEO    If the visit is dropped, the patient can be reconnected by:VIDEO VISIT: Text to cell phone:   Telephone Information:   Mobile [phone]       Will anyone else be joining the visit? NO  (If patient encounters technical issues they should call [phone]    Are changes needed to the allergy or medication list? N/A    Are refills needed on medications prescribed by this physician? NO    Rooming Documentation:  Not applicable      Reason for visit: RECHECK    Wt other than 24 hrs:    Pain more than one location:  no  Kailyn ANTONIO

## 2025-05-14 NOTE — PATIENT INSTRUCTIONS
Jay Rooney,     Follow-up with RD as needed.     Thank you,    Nevin Birmingham, SIDNEY, LD  If you would like to schedule or reschedule an appointment with the RD, please call [phone]    Nutrition Goals  1) Continue to eat 3 small meals daily,may have a protein shake for 1 of these meals.   2) Have a good protein source at all meals  3) Keep up with hydration, aim for 64 oz of fluid daily.     Physical hunger signs:   - Builds gradually     - Sensation of emptiness in stomach (rumbling)    - Based on biological need    - Goes away when satisfied/full     Psychological hunger signs:   - Occurs/comes on suddenly   - Felt mostly in the head (thoughts, preoccupation, etc)   - Crave specific foods    - Creates a desire to eat more despite fullness    - Independent of time last eaten     COMPREHENSIVE WEIGHT MANAGEMENT PROGRAM  VIRTUAL SUPPORT GROUPS    At [facility], our Comprehensive Weight Management program offers on-line support groups for patients who are working on weight loss and considering, preparing for, or have had weight loss surgery.     There is no cost for this opportunity.  You are invited to attend the?Virtual Support Groups?provided by any of the following locations:    [facility] via [employer] Teams with Roxanna Alonso RD, RN  2.   [place] via [employer] Teams with Bird Franz, PhD, LP  3.   [place] via [employer] with Margie Stock RN  4.   [facility] via a Zoom Meeting with VICENTA San-    The following Support Group information can also be found on our website:  [email or web address]      [facility]   WEIGHT LOSS SURGERY SUPPORT GROUP  The support group is a patient-lead forum that meets monthly to share experiences, encouragement and education. It is open to those who have had weight loss surgery, are scheduled for surgery, or are considering surgery.   WHEN: 3rd Wednesday  of each month from 5:00PM - 6:00PM using Microsoft Teams.   FACILITATOR: Led by Roxanna Greenberg RD, LD, RN, the program's Clinical Coordinator.   TO REGISTER: Please contact the clinic via [employer] or call the nurse line directly at [phone] to inform our staff that you would like an invite sent to you and to let us know the email you would like the invite sent to. Prior to the meeting, a link with directions on how to join the meeting will be sent to you.    2025 Meetings, 3rd Wednesday, 5:00pm - 6:00pm    January 15: Let's Talk  February19: Let's Talk  March 19: Speaker: Royce Agrawal RD, LD  April 16: Let's Talk  May 21: Speaker: Carol Thorne RD, LD  June18: Let's Talk  July 16: Let's Talk  August 20: Let's Talk  September 17: No meeting.  October 15: Speaker: Rhea Van PsyD, LP  November 19: Let's Talk  December 17: Let's Talk    [facility] BARIATRIC CARE SUPPORT GROUP  This is open to all pre- and post- operative bariatric surgery patients as well as their support system.   WHEN: 3rd Tuesday of each month from 6:30 PM - 8:00 PM using Microsoft Teams.   FACILITATOR: Led by Bird Franz, Ph.D who is a Licensed Psychologist with the [facility] Comprehensive Weight Management Program.   TO REGISTER: Please send an email to Bird Franz, Ph.D., LP at?[email or web address]?if you would like an invitation to the group. Prior to the meeting, a link with directions on how to join the meeting will be sent to you.    2025 Meetings, 3rd Tuesday January 21st: Open Forum  February 18th: Medications and Bariatric Surgery  Speaker: [facility] Res[facility]  March 18th: Open Forum  April 15th: Genetics of Obesity as well as Q&A, Speaker: Diana Main MD, [facility] Comprehensive Weight Management Program.  May 20th: Open Forum  June 17th: Nutritional Labeling, Speaker: TBD  July 15th: Open Forum  August 19th: Open  Forum  September 16th: Open Forum  October 21st: Open Forum  November 18th: Holiday Eating, Speaker: NAVEEN  December 16th: Open Forum    [facility] POST-OPERATIVE BARIATRIC SURGERY SUPPORT GROUP  This is a support group for [facility] bariatric patients (and those external to [facility]) who have had bariatric surgery and are at least 3 months post-surgery.  WHEN: 4th Thursday of the month from 11:00 AM - 12:00 PM using Microsoft Teams.   FACILITATOR: Led by Certified Bariatric Nurse, Margie Stock, RN, CBN.   TO REGISTER: Please send an email to Margie at [email or web address] if you would like an invitation to the group.  Prior to the meeting, a link with directions on how to join the meeting will be sent to you.    2025 Meetings, 4th Thursday, 11:00am - 12:pm  January 23  February 27  March 27  April 24  May 22  Candice 26  July 24  August 28  September 25  October 23  November 27: Thanksgiving Day, No meeting.      December 25: Tiffanie Day, No meeting.        [facility]   HEALTHY LIFESTYLE COACHING GROUP  This is a 60 minute virtual coaching group for those who want to lead a healthier lifestyle. Come together to set goals and overcome barriers in a supportive group environment. We will address the four pillars of health: nutrition, exercise, sleep, and emotional well-being.   WHEN: 1st Friday of the month, 12:30 PM - 1:30 PM   using a Zoom meeting.     FACILITATOR: Led by National Board-Certified Health and , Margie Yan, [facility].  TO REGISTER: Please call the [email or web address] at [phone] to register. You will get an appointment to attend in [employer]. Fifteen minutes prior to the meeting, complete the e-check in and you will get the link to join the meeting.  There is no charge to attend this group and space is limited.  Please register for each month you wish to  attend    2025 Meetings, 1st Friday, 12:30pm-1:30pm  January 3  February 7  March 7: No meeting.  April 4  May 2  Candice 6  July 4: Fourth of July Holiday, No meeting.    August 1  September 5  October 3  November 7  December 5

## 2025-05-14 NOTE — LETTER
"5/14/2025       RE: Nevin Alvarado  [address]  [facility] [address]     Dear Colleague,    Thank you for referring your patient, Nevin Alvarado, to the [facility] [place] at [facility]. Please see a copy of my visit note below.    Video-Visit Details    Type of service:  Video Visit    Video Start Time: 8:58 AM   Video End Time: 9:15 AM     Originating Location (pt. Location): Home    Distant Location (provider location): Offsite (providers home) [facility] [place]     Platform used for Video Visit: [employer]    Return Nutrition Consultation Note    Reason For Visit: Nutrition Assessment    Nevin Alvarado is a 34 year old presenting today for return nutrition medical weight management consult.  Patient is accompanied by self.     Pt referred by Sharon Toro NP on September 12, 2023.  CO-MORBIDITIES OF OBESITY INCLUDE:        9/12/2023    12:48 PM   --   I have the following health issues associated with obesity Type II Diabetes    High Blood Pressure    Sleep Apnea    Polycystic Ovarian Syndrome    GERD (Reflux)    Fatty Liver    Asthma    Stress Incontinence     SUPPORT:      9/12/2023    12:48 PM   Support System Reviewed With Patient   Who do you have in your support network that can be available to help you for the first 2 weeks after surgery? I live in a group home with 24 hour staff, mom   Who can you count on for support throughout your weight loss surgery journey? a friend, and my therapist     ANTHROPOMETRICS:  Initial Consult Weight: 309 lb on 9/13/23  Estimated body mass index is 41.15 kg/m  as calculated from the following:    Height as of this encounter: 1.575 m (5' 2\").    Weight as of this encounter: 102.1 kg (225 lb).  Current Weight: 225 lb per pt report, -84 lb since initial visit (27% of bodyweight)     Wt Readings from Last 5 Encounters:   05/07/25 " 103.6 kg (228 lb 6.3 oz)   04/14/25 102.5 kg (226 lb)   03/05/25 103.4 kg (228 lb)   02/27/25 103.4 kg (228 lb)   02/18/25 103.4 kg (228 lb)         9/12/2023    12:48 PM   --   I have tried the following methods to lose weight Watching portions or calories    Exercise    Pre packaged meals ex: Nutrisystem    OTC Medications    Prescription Medications         9/12/2023    12:48 PM   Weight Loss Questions Reviewed With Patient   How long have you been overweight? Since late teens through early 20's     MEDICATION FOR WEIGHT LOSS:  Mounjaro - 5 mg     Hx of self harm- swallowing thing- started after she was treated for anorexia when taking topiramate- in 6 months has only had one day of self harm- this was 2 weeks ago and instead of swallowing she inserted something per rectum. - Followed by MASON Potter PA-C, PE in 2019 after esophageal perforation repair     VITAMIN/MINERAL SUPPLEMENTS:  Iron, Vitamin B12, Vitamin D     NUTRITION HISTORY:  Food allergies:NKFA  Food intolerances: None  Previous experience with diet modification for weight loss: Nutrisystem, prescription medications, exercise, watching calories or portions     Has been meal planning for the past 3 months. Likes chicken, turkey, shrimp.      October 2023: Eating 3 meals per day. Lunch and dinner meals have been chili or mediterranean orzo salad with artichokes more recently. Drinks mostly water, Cup of coffee, [employer]. Doesn't drink soda or juice. Uses a walker for neuropathy. Walking around more often/standing longer which has been an improvement.     November 2023: Reports she has been sick a lot recently. Sometimes feels she has to force herself to eat. Currently has a cold. Working with a GI doctor right now also as she has been having some GI symptoms after eating certain foods. Reports she had another self harm episode unfortunately, feels this will delay her chances for surgery.     January 2024: Had been dealing with illness  recently which set her back a little bit on her goals. Has been using Wegovy. Feels since starting the Wegovy her face gets puffy or red blotches on it, plans to keep a close eye on it before deciding to stop. Had been eating very well but finding more temptations now. Feels she is not cooking as often and doing more frozen meals.  Hasn't been using walker for a few weeks.     April 2024: Since visiting Sharon nutrition has been off and on since she was in the hospital and multiple trips to the ED. Currently being treated for pneumonia, PE, depression and anxiety. Reports she continues to have constipation/diarrhea on and off. Has Miralax as needed. Also has enema prescription also. Nutrition has been very challenging over the last month due to being in the hospital for 15 days. She did continue to do her Wegovy injections while in the hospital. Plan is to focus on getting healthy and feeling better then will work on her good eating habits again.      June 2024: Things have been going a lot better for patient since last visit. She switched from Wegovy to Ozempic over the last month. Eating 2-3 meals a day. Bowel habits have improved since last RD visit. Physical activity has gotten a lot better with the weight loss. Does not use walker anymore. Walking around a lot. Also spoke with a diabetes educator in May.     August 2024: Psychologist recommended patient not have surgery. Patient is ok with this and is happy with her weight loss on AOMs.     Last few months have been harder due to health conditions she has been dealing with and some stress around her guardianship and housing. She is trying her best to eat healthfully but hasn't found much interest in cooking. If her housing situation changes she is going to look into seeing if she can get [employer] Meals. Trying to increase her fluid intake. Will have constipation at times but feels this has gotten a little better since her last trip to the ED.     Physical activity  - will walk instead of using the scooter at stores.     Per Sharon Toro NP's recent note: Tolerating ozempic 1mg well. Disinterest in food, nausea/ vomiting and constipation have resolved. Appetite okay. Intake has been limited by new dental issues that have been going on for the last several weeks. She should be done now with dental procedures so discussed getting back on track with adequate protein and hydration. Stressed importance of not skipping meals, adequate protein and hydration.     November 2024: Moving in 3 weeks. Patient is her own guardian now.  Cooking your own meals. On the Ozempic 1 mg dose. Has been tolerating that dose. Has been dealing with constipation. Eating 3 small meals a day. Prioritizing protein the best she can. Doing better with her fluid intake now that she can drink from straws again. Averaging about 30 oz of water daily.     Physical activity - getting easier. Able to walk more, can get through the stores a lot easier when shopping.     Has a gym in her new apartment building.     January 2025: Patient reports she has been dealing with constipation issues. This ended up in her going to the ED multiple times in the last week. Now taking daily colace and that has been helpful.      In the last month patient moved into her own apartment. She has been struggling financially to afford groceries. Patient sent a [employer] message earlier today with a form from the [facility] that she is hoping the weight management team can fill out to help see if she can obtain some additional support for food.     Encouraged patient to check out my resources that I have included in my not for some food assistance programs in the [facility].     March 2025: Currently in the process for prepping for a colonoscopy. Got some additional support from the [facility] for groceries. Continues to have less motivation to cook for herself. Finding it hard still to afford groceries even with additional help from [facility]. Recently  got a [employer] membership and is hopeful she can get some good protein options. Patient bought some protein bars recently, Jainism protein oatmeal, and also made homemade breakfast sandwiches.     Patient is going to look into possibly getting the Fairlife or Premier Protein shakes. Also is going to look into some higher protein frozen meal options. Previously has done [employer], she is going to look into the requirements for that program with her [employer] worker.     May 2025: Had surgery on her knee on March 24. Has been recovery well from that. Since last RD visit patient now is getting [employer] Meals delivered weekly. She gets 10 meals every 2 weeks.     Patient will typically eat breakfast but finds she often doesn't have another meal till dinner. She will have snacks occasionally and will have protein bars, protein shake, or protein chips.     Hydration is going well.     Physical therapy - has been going to PT for her knee.     Progress to Previous Goals  1) Continue to eat 3 small meals daily - not consistently met  2) Have a good protein source at all meals - met for the most part   3) Keep up with hydration, aim for 64 oz of fluid daily. - met        9/12/2023    12:48 PM   Recall Diet Questions Reviewed With Patient   Describe what you typically consume for breakfast (typical or most recent) eggs and hash browns, homemade waffles, laith pudding with fruit   Describe what you typically consume for lunch (typical or most recent) homemade lunchables, some pasta or chicken   Describe what you typically consume for supper (typical or most recent) low calorie meal prep meals   Describe what you typically consume as snacks (typical or most recent) cheese sticks, fruit jerky, fruits/vegetables   How many ounces of water, or other low calorie drinks, do you drink daily (8 oz=1 glass)? 48 oz   How many ounces of caffeine (coffee, tea, pop) do you drink daily (8 oz=1 glass)? 16 oz   How many ounces of carbonated (pop, beer,  sparkling water) drinks do you drinky daily (8 oz=1 glass)? 0 oz   How many ounces of juice, pop, sweet tea, sports drinks, protein drinks, other sweetened drinks, do you drink daily (8 oz=1 glass)? 0 oz   How many ounces of milk do you drink daily (8 oz=1 glass) 0 oz   How often do you drink alcohol? Never           9/12/2023    12:48 PM   Eating Habits   What foods do you crave? ice cream, chocolate, sometimes just salty chips           9/12/2023    12:48 PM   Dining Out History Reviewed With Patient   How often do you dine out? Rarely.   Where do you dine out? (select all that apply) take out   What types of food do you order when you dine out? jitendra verdin     EXERCISE:        7/20/2024     9:33 AM   --   How often do you exercise? 1 to 2 times per week   What is the duration of your exercise (in minutes)? 15 Minutes   What types of exercise do you do? walking   What keeps you from being more active? Pain     NUTRITION DIAGNOSIS:  Obesity r/t long history of positive energy balance aeb BMI >30 kg/m2.    INTERVENTION:  Intervention Provided/Education Provided on post-op diet guidelines, vitamins/minerals essential post-operatively, GI anatomy of bariatric surgeries, ways to help prepare for post-op diet guidelines pre-operatively, portion/calorie-control, mindful eating and sources of protein.  Patient demonstrates understanding.     Personal barriers to making and continuing required life changes have been identified, and strategies to overcome those barriers have been recommended AND family and social supports have been assessed and strategies to strengthen those supports have been recommended.    Provided pt with list of goals, RD contact information and resources listed below via [facility].           9/12/2023    12:48 PM   Questions Reviewed With Patient   How ready are you to make changes regarding your weight? Number 1 = Not ready at all to make changes up to 10 = very ready. 10   How confident are you that  you can change? 1 = Not confident that you will be successful making changes up to 10 = very confident. 7     Expected Engagement: good    GOALS:  1) Continue to eat 3 small meals daily,may have a protein shake for 1 of these meals.   2) Have a good protein source at all meals  3) Keep up with hydration, aim for 64 oz of fluid daily.     Physical hunger signs:   - Builds gradually     - Sensation of emptiness in stomach (rumbling)    - Based on biological need    - Goes away when satisfied/full     Psychological hunger signs:   - Occurs/comes on suddenly   - Felt mostly in the head (thoughts, preoccupation, etc)   - Crave specific foods    - Creates a desire to eat more despite fullness    - Independent of time last eaten     Follow Up: as needed.     Time spent with patient: 17 minutes.  Nevin Birmingham RD, LD        Again, thank you for allowing me to participate in the care of your patient.      Sincerely,    Nevin Birmingham RD

## 2025-05-15 ENCOUNTER — LAB (OUTPATIENT)
Dept: LAB | Facility: CLINIC | Age: 35
End: 2025-05-15
Payer: COMMERCIAL

## 2025-05-15 DIAGNOSIS — G61.81 CHRONIC INFLAMMATORY DEMYELINATING POLYNEURITIS (H): Primary | ICD-10-CM

## 2025-05-15 DIAGNOSIS — E55.9 AVITAMINOSIS D: ICD-10-CM

## 2025-05-15 LAB
CRP SERPL-MCNC: 7.6 MG/L
LAB ORDER RESULT STATUS: NORMAL
Lab: NORMAL
PERFORMING LABORATORY: NORMAL
TEST NAME: NORMAL
VIT B12 SERPL-MCNC: 1033 PG/ML (ref 232–1245)
VIT D+METAB SERPL-MCNC: 46 NG/ML (ref 20–50)

## 2025-05-15 PROCEDURE — 86235 NUCLEAR ANTIGEN ANTIBODY: CPT

## 2025-05-15 PROCEDURE — 82306 VITAMIN D 25 HYDROXY: CPT

## 2025-05-15 PROCEDURE — 82607 VITAMIN B-12: CPT

## 2025-05-15 PROCEDURE — 86341 ISLET CELL ANTIBODY: CPT

## 2025-05-15 PROCEDURE — 83921 ORGANIC ACID SINGLE QUANT: CPT

## 2025-05-15 PROCEDURE — 86255 FLUORESCENT ANTIBODY SCREEN: CPT

## 2025-05-15 PROCEDURE — 86140 C-REACTIVE PROTEIN: CPT

## 2025-05-15 PROCEDURE — 36415 COLL VENOUS BLD VENIPUNCTURE: CPT

## 2025-05-17 LAB
ENA SS-A AB SER IA-ACNC: <0.5 U/ML
ENA SS-A AB SER IA-ACNC: NEGATIVE
ENA SS-B IGG SER IA-ACNC: <0.6 U/ML
ENA SS-B IGG SER IA-ACNC: NEGATIVE
GAD65 AB SER IA-ACNC: <5 IU/ML

## 2025-05-20 ENCOUNTER — HOSPITAL ENCOUNTER (EMERGENCY)
Facility: CLINIC | Age: 35
Discharge: HOME | End: 2025-05-20
Attending: STUDENT IN AN ORGANIZED HEALTH CARE EDUCATION/TRAINING PROGRAM | Admitting: STUDENT IN AN ORGANIZED HEALTH CARE EDUCATION/TRAINING PROGRAM
Payer: COMMERCIAL

## 2025-05-20 ENCOUNTER — APPOINTMENT (OUTPATIENT)
Dept: CT IMAGING | Facility: CLINIC | Age: 35
End: 2025-05-20
Attending: STUDENT IN AN ORGANIZED HEALTH CARE EDUCATION/TRAINING PROGRAM
Payer: COMMERCIAL

## 2025-05-20 VITALS
OXYGEN SATURATION: 96 % | RESPIRATION RATE: 20 BRPM | SYSTOLIC BLOOD PRESSURE: 138 MMHG | DIASTOLIC BLOOD PRESSURE: 97 MMHG | HEART RATE: 93 BPM | TEMPERATURE: 98.4 F

## 2025-05-20 DIAGNOSIS — R10.31 RIGHT LOWER QUADRANT ABDOMINAL PAIN: ICD-10-CM

## 2025-05-20 LAB
ALBUMIN SERPL BCG-MCNC: 4.6 G/DL (ref 3.5–5.2)
ALP SERPL-CCNC: 76 U/L (ref 40–150)
ALT SERPL W P-5'-P-CCNC: 41 U/L (ref 0–50)
AMPHIPHYSIN IGG SER QL IA: NEGATIVE
ANION GAP SERPL CALCULATED.3IONS-SCNC: 12 MMOL/L (ref 7–15)
ANNOTATION COMMENT IMP: NORMAL
AST SERPL W P-5'-P-CCNC: 17 U/L (ref 0–45)
BASOPHILS # BLD AUTO: 0 10E3/UL (ref 0–0.2)
BASOPHILS NFR BLD AUTO: 0 %
BILIRUB SERPL-MCNC: 0.2 MG/DL
BUN SERPL-MCNC: 11.7 MG/DL (ref 6–20)
CALCIUM SERPL-MCNC: 9.7 MG/DL (ref 8.8–10.4)
CHLORIDE SERPL-SCNC: 104 MMOL/L (ref 98–107)
CREAT SERPL-MCNC: 0.55 MG/DL (ref 0.51–0.95)
CV2 AB SERPL QL IF: NEGATIVE
EGFRCR SERPLBLD CKD-EPI 2021: >90 ML/MIN/1.73M2
EOSINOPHIL # BLD AUTO: 0 10E3/UL (ref 0–0.7)
EOSINOPHIL NFR BLD AUTO: 0 %
ERYTHROCYTE [DISTWIDTH] IN BLOOD BY AUTOMATED COUNT: 13.5 % (ref 10–15)
GLIAL NUC TYPE 1 AB SER QL IF: NEGATIVE
GLUCOSE SERPL-MCNC: 166 MG/DL (ref 70–99)
HCG INTACT+B SERPL-ACNC: <1 MIU/ML
HCO3 SERPL-SCNC: 22 MMOL/L (ref 22–29)
HCT VFR BLD AUTO: 44 % (ref 35–47)
HGB BLD-MCNC: 15.1 G/DL (ref 11.7–15.7)
HOLD SPECIMEN: NORMAL
HOLD SPECIMEN: NORMAL
HU1 AB SER QL: NEGATIVE
HU2 AB SER QL IF: NEGATIVE
HU3 AB SER QL: NEGATIVE
IMM GRANULOCYTES # BLD: 0.1 10E3/UL
IMM GRANULOCYTES NFR BLD: 1 %
LIPASE SERPL-CCNC: 31 U/L (ref 13–60)
LYMPHOCYTES # BLD AUTO: 1 10E3/UL (ref 0.8–5.3)
LYMPHOCYTES NFR BLD AUTO: 9 %
MCH RBC QN AUTO: 30.2 PG (ref 26.5–33)
MCHC RBC AUTO-ENTMCNC: 34.3 G/DL (ref 31.5–36.5)
MCV RBC AUTO: 88 FL (ref 78–100)
MONOCYTES # BLD AUTO: 0.4 10E3/UL (ref 0–1.3)
MONOCYTES NFR BLD AUTO: 3 %
NEUTROPHILS # BLD AUTO: 9.6 10E3/UL (ref 1.6–8.3)
NEUTROPHILS NFR BLD AUTO: 87 %
NRBC # BLD AUTO: 0 10E3/UL
NRBC BLD AUTO-RTO: 0 /100
PARANEOPLASTIC AB SER-IMP: NORMAL
PCA-1 AB SER QL IF: NEGATIVE
PCA-2 AB SER QL IF: NEGATIVE
PCA-TR AB SER QL IF: NEGATIVE
PLATELET # BLD AUTO: 342 10E3/UL (ref 150–450)
POTASSIUM SERPL-SCNC: 4 MMOL/L (ref 3.4–5.3)
PROT SERPL-MCNC: 7.3 G/DL (ref 6.4–8.3)
RBC # BLD AUTO: 5 10E6/UL (ref 3.8–5.2)
SODIUM SERPL-SCNC: 138 MMOL/L (ref 135–145)
WBC # BLD AUTO: 11.1 10E3/UL (ref 4–11)

## 2025-05-20 PROCEDURE — 99285 EMERGENCY DEPT VISIT HI MDM: CPT | Mod: 25

## 2025-05-20 PROCEDURE — 82565 ASSAY OF CREATININE: CPT | Performed by: STUDENT IN AN ORGANIZED HEALTH CARE EDUCATION/TRAINING PROGRAM

## 2025-05-20 PROCEDURE — 74176 CT ABD & PELVIS W/O CONTRAST: CPT

## 2025-05-20 PROCEDURE — 36415 COLL VENOUS BLD VENIPUNCTURE: CPT | Performed by: STUDENT IN AN ORGANIZED HEALTH CARE EDUCATION/TRAINING PROGRAM

## 2025-05-20 PROCEDURE — 84702 CHORIONIC GONADOTROPIN TEST: CPT | Performed by: STUDENT IN AN ORGANIZED HEALTH CARE EDUCATION/TRAINING PROGRAM

## 2025-05-20 PROCEDURE — 85004 AUTOMATED DIFF WBC COUNT: CPT | Performed by: STUDENT IN AN ORGANIZED HEALTH CARE EDUCATION/TRAINING PROGRAM

## 2025-05-20 PROCEDURE — 96374 THER/PROPH/DIAG INJ IV PUSH: CPT

## 2025-05-20 PROCEDURE — 250N000013 HC RX MED GY IP 250 OP 250 PS 637: Performed by: STUDENT IN AN ORGANIZED HEALTH CARE EDUCATION/TRAINING PROGRAM

## 2025-05-20 PROCEDURE — 83690 ASSAY OF LIPASE: CPT | Performed by: STUDENT IN AN ORGANIZED HEALTH CARE EDUCATION/TRAINING PROGRAM

## 2025-05-20 PROCEDURE — 250N000011 HC RX IP 250 OP 636: Mod: JZ | Performed by: STUDENT IN AN ORGANIZED HEALTH CARE EDUCATION/TRAINING PROGRAM

## 2025-05-20 RX ORDER — DICYCLOMINE HCL 20 MG
20 TABLET ORAL ONCE
Status: COMPLETED | OUTPATIENT
Start: 2025-05-20 | End: 2025-05-20

## 2025-05-20 RX ORDER — ACETAMINOPHEN 500 MG
1000 TABLET ORAL ONCE
Status: COMPLETED | OUTPATIENT
Start: 2025-05-20 | End: 2025-05-20

## 2025-05-20 RX ORDER — DROPERIDOL 2.5 MG/ML
2 INJECTION, SOLUTION INTRAMUSCULAR; INTRAVENOUS ONCE
Status: COMPLETED | OUTPATIENT
Start: 2025-05-20 | End: 2025-05-20

## 2025-05-20 RX ORDER — KETOROLAC TROMETHAMINE 15 MG/ML
15 INJECTION, SOLUTION INTRAMUSCULAR; INTRAVENOUS ONCE
Status: COMPLETED | OUTPATIENT
Start: 2025-05-20 | End: 2025-05-20

## 2025-05-20 RX ADMIN — ACETAMINOPHEN 1000 MG: 500 TABLET ORAL at 22:43

## 2025-05-20 RX ADMIN — KETOROLAC TROMETHAMINE 15 MG: 15 INJECTION, SOLUTION INTRAMUSCULAR; INTRAVENOUS at 21:24

## 2025-05-20 RX ADMIN — DICYCLOMINE HYDROCHLORIDE 20 MG: 20 TABLET ORAL at 22:43

## 2025-05-20 ASSESSMENT — ACTIVITIES OF DAILY LIVING (ADL)
ADLS_ACUITY_SCORE: 67

## 2025-05-21 LAB
MAYO MISC RESULT: NORMAL
METHYLMALONATE SERPL-SCNC: 0.14 UMOL/L (ref 0–0.4)

## 2025-05-21 NOTE — ED PROVIDER NOTES
Emergency Department Note      History of Present Illness     Chief Complaint   Abdominal Pain      HPI   Nevin Alvarado is a 34 year old female with a history of hypertension, GERD, type II diabetes mellitus, and anticoagulation with Apixaban for recurrent pulmonary embolism who presents for an evaluation of abdominal pain. The patient reports the sudden onset of right sided abdominal pain at 11:30 earlier today, which she characterized as a sour burning sensation. She explains that said pain manifested as a cramping sensation throughout the day and additionally endorses experiencing a headache, dizziness and lightheadedness during ambulation, and an inability to pass gas. Elaborates that she presented for evaluation of her symptoms to , where evidence of a possible bowel obstruction was found on X-ray. Indicates that she called her physician and [facility], who advised that she should present to the ED to undergo a CT scan. Denies experiencing a previous bowel obstruction. States that she has not swallowed a foreign body in 560 days.     Independent Historian   None    Review of External Notes   Urgent care note from earlier today for abdominal pain reviewed.    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  Persistent depressive disorder  Hypertension  ZOHRA  RLS  Carpal tunnel syndrome  Chronic PTSD  GERD  Intentional diphenydramine overdose  Recurrent pulmonary emboli  Type II diabetes mellitus  ADD  Borderline personality disorder    Medications   Ferrous sulfate  Mounjaro  Semaglutide  Amitryptiline  Apixaban  Pregabalin  Tizanidine    Surgical History   EGD, combined  IR Lumbar puncture  Foreign body removal, rectum  DE EGD transoral diagnostic  Release carpal tunnel, bilateral  Exploratory laparotomy  Sigmoidoscopy flexible  Right knee surgery  Lymph nodes removed  from neck, benign  Laparoscopic ablation endometriosis  Mammoplasty reduction, bilateral    Physical Exam     Patient Vitals for the past 24 hrs:   BP Temp Pulse Resp SpO2   05/20/25 2300 (!) 138/97 -- -- -- 96 %   05/20/25 2230 -- -- -- -- 96 %   05/20/25 2215 -- -- -- -- 99 %   05/20/25 2115 -- -- -- -- 96 %   05/20/25 2100 -- -- -- -- 97 %   05/20/25 2045 -- -- -- -- 99 %   05/20/25 2030 -- -- -- -- 96 %   05/20/25 2015 (!) 150/108 -- 93 -- --   05/20/25 1900 (!) 152/87 98.4  F (36.9  C) 97 20 100 %     Physical Exam  GENERAL: Patient appears distressed and is crying  HEAD: Atraumatic.  NECK: No rigidity  CV: RRR, no murmurs, rubs or gallops  PULM: CTAB with good aeration; no retractions, rales, rhonchi, or wheezing  ABD: Mild to moderate right sided abdominal tenderness.  No lower abdominal tenderness.  No rigidity or guarding.  DERM: No rash. Skin warm and dry  EXTREMITY: Moving all extremities without difficulty.     Diagnostics     Lab Results   Labs Ordered and Resulted from Time of ED Arrival to Time of ED Departure   COMPREHENSIVE METABOLIC PANEL - Abnormal       Result Value    Sodium 138      Potassium 4.0      Carbon Dioxide (CO2) 22      Anion Gap 12      Urea Nitrogen 11.7      Creatinine 0.55      GFR Estimate >90      Calcium 9.7      Chloride 104      Glucose 166 (*)     Alkaline Phosphatase 76      AST 17      ALT 41      Protein Total 7.3      Albumin 4.6      Bilirubin Total 0.2     CBC WITH PLATELETS AND DIFFERENTIAL - Abnormal    WBC Count 11.1 (*)     RBC Count 5.00      Hemoglobin 15.1      Hematocrit 44.0      MCV 88      MCH 30.2      MCHC 34.3      RDW 13.5      Platelet Count 342      % Neutrophils 87      % Lymphocytes 9      % Monocytes 3      % Eosinophils 0      % Basophils 0      % Immature Granulocytes 1      NRBCs per 100 WBC 0      Absolute Neutrophils 9.6 (*)     Absolute Lymphocytes 1.0      Absolute Monocytes 0.4      Absolute Eosinophils 0.0      Absolute Basophils 0.0       Absolute Immature Granulocytes 0.1      Absolute NRBCs 0.0     LIPASE - Normal    Lipase 31     HCG QUANTITATIVE PREGNANCY - Normal    hCG Quantitative <1         Imaging   CT Abdomen Pelvis w/o Contrast   Final Result   IMPRESSION:    1.  No evidence for bowel obstruction.   2.  No renal calculi or hydronephrosis.   3.  Cholecystectomy and appendectomy.   4.  No findings to account for the patient's right-sided abdominal pain.                ED Course      Medications Administered   Medications   droPERidol (INAPSINE) injection 2 mg (2 mg Intravenous Not Given 5/20/25 2103)   ketorolac (TORADOL) injection 15 mg (15 mg Intravenous $Given 5/20/25 2124)   acetaminophen (TYLENOL) tablet 1,000 mg (1,000 mg Oral $Given 5/20/25 2243)   dicyclomine (BENTYL) tablet 20 mg (20 mg Oral $Given 5/20/25 2243)       Procedures   Procedures     Discussion of Management   None    ED Course   ED Course as of 05/20/25 2315 Tue May 20, 2025   1932 I obtained the history and evaluated the patient as noted above.        Additional Documentation  None    Medical Decision Making / Diagnosis     CMS Diagnoses: None    MIPS   None               [facility]   Nevin Alvarado is a 34 year old female presenting with abdominal pain.  I reviewed her care plan.  She has had countless CT scans in the past.  She did have an x-ray from earlier today that showed potential SBO and this with her tearful and upset and concern for significant abdominal pain, ordered CT scan here which thankfully is negative for acute process.  The pelvic organs are also unremarkable.  She does not have lower abdominal tenderness, nor complain of pelvic pain - furthermore reported alternating diarrhea and constipation.  I do not think this is torsion.  I discussed this with the patient.  She does not want any other imaging either.  Toradol x 1 was given.  Still had pain.  Declined droperidol.  Held off on any narcotics based off of care plan.  Given Tylenol and Bentyl  later and she did have some improvement.  She prefers to go home at this time and continue to monitor her symptoms which I think is reasonable.  Her labs are reassuring.  All questions answered.  Given strict return precautions.  Discharged in stable condition.    Disposition   The patient was discharged.     Diagnosis     ICD-10-CM    1. Right lower quadrant abdominal pain  R10.31            Discharge Medications   New Prescriptions    No medications on file         Scribe Disclosure:  I, Andrea Benavides, am serving as a scribe at 7:42 PM on 5/20/2025 to document services personally performed by Aquilino Srinivasan MD based on my observations and the provider's statements to me.       Aquilino Srinivasan MD  05/20/25 [id number]

## 2025-05-21 NOTE — DISCHARGE INSTRUCTIONS
Return to the emergency department if symptoms are worsening, become concerning, or for any other concerns. Follow-up with your doctor in 2-3 days and sooner if needed.    Discharge Instructions  Abdominal Pain    Abdominal pain (belly pain) can be caused by many things. Your evaluation today does not show the exact cause for your pain. Your provider today has decided that it is unlikely your pain is due to a life threatening problem, or a problem requiring surgery or hospital admission. Sometimes those problems cannot be found right away, so it is very important that you follow up as directed.  Sometimes only the changes which occur over time allow the cause of your pain to be found.    Generally, every Emergency Department visit should have a follow-up clinic visit with either a primary or a specialty clinic/provider. Please follow-up as instructed by your emergency provider today. With abdominal pain, we often recommend very close follow-up, such as the following day.    ADULTS:  Return to the Emergency Department right away if:    You get an oral temperature above 102oF or as directed by your provider.  You have blood in your stools. This may be bright red or appear as black, tarry stools.    You keep vomiting (throwing up) or cannot drink liquids.  You see blood when you vomit.   You cannot have a bowel movement or you cannot pass gas.  Your stomach gets bloated or bigger.  Your skin or the whites of your eyes look yellow.  You faint.  You have bloody, frequent or painful urination (peeing).  You have new symptoms or anything that worries you.    CHILDREN:  Return to the Emergency Department right away if your child has any of the above-listed symptoms or the following:    Pushes your hand away or screams/cries when his/her belly is touched.  You notice your child is very fussy or weak.  Your child is very tired and is too tired to eat or drink.  Your child is dehydrated.  Signs of dehydration can  be:  Significant change in the amount of wet diapers/urine.  Your infant or child starts to have dry mouth and lips, or no saliva (spit) or tears.    PREGNANT WOMEN:  Return to the Emergency Department right away if you have any of the above-listed symptoms or the following:    You have bleeding, leaking fluid or passing tissue from the vagina.  You have worse pain or cramping, or pain in your shoulder or back.  You have vomiting that will not stop.  You have a temperature of 100oF or more.  Your baby is not moving as much as usual.  You faint.  You get a bad headache with or without eye problems and abdominal pain.  You have a seizure.  You have unusual discharge from your vagina and abdominal pain.    Abdominal pain is pretty common during pregnancy.  Your pain may or may not be related to your pregnancy. You should follow-up closely with your OB provider so they can evaluate you and your baby.  Until you follow-up with your regular provider, do the following:     Avoid sex and do not put anything in your vagina.  Drink clear fluids.  Only take medications approved by your provider.    MORE INFORMATION:    Appendicitis:  A possible cause of abdominal pain in any person who still has their appendix is acute appendicitis. Appendicitis is often hard to diagnose.  Testing does not always rule out early appendicitis or other causes of abdominal pain. Close follow-up with your provider and re-evaluations may be needed to figure out the reason for your abdominal pain.    Follow-up:  It is very important that you make an appointment with your clinic and go to the appointment.  If you do not follow-up with your primary provider, it may result in missing an important development which could result in permanent injury or disability and/or lasting pain.  If there is any problem keeping your appointment, call your provider or return to the Emergency Department.    Medications:  Take your medications as directed by your  "provider today.  Before using over-the-counter medications, ask your provider and make sure to take the medications as directed.  If you have any questions about medications, ask your provider.    Diet:  Resume your normal diet as much as possible, but do not eat fried, fatty or spicy foods while you have pain.  Do not drink alcohol or have caffeine.  Do not smoke tobacco.    Probiotics: If you have been given an antibiotic, you may want to also take a probiotic pill or eat yogurt with live cultures. Probiotics have \"good bacteria\" to help your intestines stay healthy. Studies have shown that probiotics help prevent diarrhea (loose stools) and other intestine problems (including C. diff infection) when you take antibiotics. You can buy these without a prescription in the pharmacy section of the store.     If you were given a prescription for medicine here today, be sure to read all of the information (including the package insert) that comes with your prescription.  This will include important information about the medicine, its side effects, and any warnings that you need to know about.  The pharmacist who fills the prescription can provide more information and answer questions you may have about the medicine.  If you have questions or concerns that the pharmacist cannot address, please call or return to the Emergency Department.       Remember that you can always come back to the Emergency Department if you are not able to see your regular provider in the amount of time listed above, if you get any new symptoms, or if there is anything that worries you.    "

## 2025-05-21 NOTE — ED TRIAGE NOTES
Here for concern of right sided abdominal pain started around 11:30am today associated with nausea and headache. Patient went to urgent care, had blood, urine, and xray done that shows ileus vs bowel obstruction, patient called her GI and was told to go to the ED for CT scan. ABCs intact.     Triage Assessment (Adult)       Row Name 05/20/25 1932 05/20/25 1923       Triage Assessment    Airway WDL WDL WDL       Respiratory WDL    Respiratory WDL WDL WDL       Cardiac WDL    Cardiac WDL WDL WDL

## 2025-05-25 ENCOUNTER — HEALTH MAINTENANCE LETTER (OUTPATIENT)
Age: 35
End: 2025-05-25

## 2025-05-28 ENCOUNTER — VIRTUAL VISIT (OUTPATIENT)
Dept: ENDOCRINOLOGY | Facility: CLINIC | Age: 35
End: 2025-05-28
Payer: COMMERCIAL

## 2025-05-28 VITALS — HEIGHT: 62 IN | BODY MASS INDEX: 40.85 KG/M2 | WEIGHT: 222 LBS

## 2025-05-28 DIAGNOSIS — E66.813 CLASS 3 SEVERE OBESITY WITH SERIOUS COMORBIDITY AND BODY MASS INDEX (BMI) OF 50.0 TO 59.9 IN ADULT, UNSPECIFIED OBESITY TYPE (H): Primary | ICD-10-CM

## 2025-05-28 DIAGNOSIS — E11.9 TYPE 2 DIABETES MELLITUS WITHOUT COMPLICATION, WITHOUT LONG-TERM CURRENT USE OF INSULIN (H): ICD-10-CM

## 2025-05-28 DIAGNOSIS — B37.2 INTERTRIGINOUS CANDIDIASIS: ICD-10-CM

## 2025-05-28 LAB — MAYO MISC RESULT: NORMAL

## 2025-05-28 PROCEDURE — 98007 SYNCH AUDIO-VIDEO EST HI 40: CPT | Performed by: NURSE PRACTITIONER

## 2025-05-28 PROCEDURE — G2211 COMPLEX E/M VISIT ADD ON: HCPCS | Mod: 95 | Performed by: NURSE PRACTITIONER

## 2025-05-28 RX ORDER — NYSTATIN 100000 U/G
CREAM TOPICAL 2 TIMES DAILY PRN
Qty: 30 G | Refills: 1 | Status: SHIPPED | OUTPATIENT
Start: 2025-05-28

## 2025-05-28 NOTE — NURSING NOTE
Current patient location: [address] [place] [address]   [facility] [address]    Is the patient currently in the state of [place]? yes    Visit mode:VIDEO    If the visit is dropped, the patient can be reconnected by: sergio    Will anyone else be joining the visit? NO  (If patient encounters technical issues they should call [phone]    Are changes needed to the allergy or medication list? Pt stated no changes to allergies and Pt stated no med changes    Are refills needed on medications prescribed by this physician? Yes and wants to increase    Reason for visit: RECHECK    Janie Law VVF    Pt taking mounjaro and it was not an option on the rtn mwn qnrs.

## 2025-05-28 NOTE — LETTER
2025       RE: Nevin Alvarado  [address]  [facility] [address]     Dear Colleague,    Thank you for referring your patient, Nevin Alvarado, to the [facility] [place] [facility]. Please see a copy of my visit note below.      Return Medical Weight Management Note     Nevin Alvarado  MRN:  [id number]  :  1990  MOR:  2025    Dear Latonya Knight MD,    I had the pleasure of seeing your patient Nevin Alvarado. She is a 34 year old female who I am continuing to see for treatment of obesity related to:        2023    12:48 PM   --   I have the following health issues associated with obesity Type II Diabetes    High Blood Pressure    Sleep Apnea    Polycystic Ovarian Syndrome    GERD (Reflux)    Fatty Liver    Asthma    Stress Incontinence       Assessment & Plan  Problem List Items Addressed This Visit          Digestive    Class 3 severe obesity with serious comorbidity and body mass index (BMI) of 50.0 to 59.9 in adult, unspecified obesity type (H) - Primary    Has had numerous procedures requiring pause in mounjaro. Has now been off x 2.5 weeks. When taking 5mg consistently in April was noticing increased hunger and cravings. Difficult to avoid foods she has been able to avoid previously. Cravings making difficult to make healthy choices. Would like to increase dose. Do not recommend increasing until she is taking 5mg consistently for a few weeks. Has 3 pens of 5mg left. Will take those then increase to 7.5mg. did discuss that she is on the edge of being able to restart without dose reduction. May benefit from going down to 2.5mg but she'd like to try at 5mg. Stressed hydration and good nutrition while restarting.     Lots of questions about excess skin removal and rashes. Discussed management of rashes. Explained that skin removal is usually after she is at goal  weight due to risk for needing repeat surgery. Discussed insurance does not usually cover skin removal unless impacting quality of life.     Restart mounjaro 5mg x 3 weeks then increase to 7.5mg   Try switching to crystal light   Avoid liquid calories when possible   Continue working on meal prepping   Try nystatin in skin folds          Relevant Medications    Tirzepatide (MOUNJARO) 7.5 MG/0.5ML SOAJ auto-injector pen    nystatin (MYCOSTATIN) 370208 UNIT/GM external cream       Endocrine    Type 2 diabetes mellitus without complication, without long-term current use of insulin (H)    Relevant Medications    Tirzepatide (MOUNJARO) 7.5 MG/0.5ML SOAJ auto-injector pen    nystatin (MYCOSTATIN) [id number] UNIT/GM external cream     Other Visit Diagnoses         Intertriginous candidiasis        Relevant Medications    nystatin (MYCOSTATIN) [id number] UNIT/GM external cream               INTERVAL HISTORY:    NBS 9/2023 BMI 56.5 with DMII, ZOHRA, GERD, and hx of PE. Mental health struggles contributing to weight gain including weight gain associated with medications. This was complicated by numerous episodes of swallowing non food items which has lead to perforations of the esophagus and strictures for which she is followed by GI. Had been stable for several months prior to a relapse just before our consult. We discussed mwm to start given increased risks after bariatric surgery to the stomach. Initially switched rybelsus for DMII to wegovy for better efficacy. Due to insurance change she is now taking ozempic.      Has been having dental issues that are not healing well. Had surgery 1 week ago.   Rolled ankle and broke ankle 4 weeks ago      Was considering hysterectomy/ exp lap for endometriosis in Feb 2025 but then that OB left the system    Last seen 2/2025- switched to mounjaro 5mg, discussed working with MTM, living on own and experiencing some food insecurity       4/2025- referred to gen surg for endometriosis and  adhesions, ruling iout premature ovarian failure and early menopause. Takes oral birth control daily without placebo weeks     5/2025- ED visit for RLQ pain, CT/ lab work up negative for bowel obstruction, renal calculi, or other cause for pain. Didn't feel constipated, takes colace daily. Also followed by [facility] - last seen today at [facility] for hemorrhoid symptom - now will see colorectal surgeon for a thrombosed hemorrhoid.     Intermittent nausea, burping- not better when off tirzepatide. EGD yesterday - biopsies pending (inflammation noted).     Has not been on it consistently for several weeks. When taking consistently in April- had increased hunger and cravings     Anti-obesity medication history    Current:   Mounjaro 5mg - off x 3 weeks for EGD today     Past/Failed/contraindicated:   Ozempic, disinterest in food at 1mg, severe constipation with dose increase     Recent diet changes:   Lots of cravings   More juice than water   Doing some protein shakes   Prioritizing protein   Got increase in EBT funds   Getting some home delivery meals     Recent exercise/activity changes:   Knee surgery- recovering well, moving around more     Recent stressors:   SW through [facility]     Vitamins/Labs: 5/2025     Excess skin/ rashes:   Under abdominal skin- cracks in creases  Under arms   On side of breasts  Gluteal crease   Lots of questions today about skin removal surgery     Not sexually active with male partners     CURRENT WEIGHT:   222 lbs 0 oz    Initial Weight (lbs): 309 lbs  Last Visits Weight: 102.1 kg (225 lb)  Cumulative weight loss (lbs): 87  Weight Loss Percentage: 28.16%        5/26/2025     4:02 PM   Changes and Difficulties   I have made the following changes to my diet since my last visit: Added more protein sources theoughout the day   With regards to my diet, I am still struggling with: Finding bew recipes to try so i dont get burnt out on the things i want to enjoy   I have made the following changes to my  activity/exercise since my last visit: More walking and physical activity   With regards to my activity/exercise, I am still struggling with: Pain         MEDICATIONS:   Current Outpatient Medications   Medication Sig Dispense Refill     nystatin (MYCOSTATIN) [id number] UNIT/GM external cream Apply topically 2 times daily as needed for dry skin. 30 g 1     Tirzepatide (MOUNJARO) 7.5 MG/0.5ML SOAJ auto-injector pen Inject 0.5 mLs (7.5 mg) subcutaneously once a week. 2 mL 2     albuterol (PROAIR HFA/PROVENTIL HFA/VENTOLIN HFA) 108 (90 Base) MCG/ACT inhaler Inhale 2 puffs into the lungs every 6 hours as needed for shortness of breath / dyspnea or wheezing 18 g 0     albuterol (PROVENTIL) (2.5 MG/3ML) 0.083% neb solution Take 1 vial (2.5 mg) by nebulization every 6 hours as needed for shortness of breath or wheezing 90 mL 0     amitriptyline (ELAVIL) 25 MG tablet Take 35 mg by mouth at bedtime. 10 mg plus 25 mg = 35 mg       apixaban ANTICOAGULANT (ELIQUIS) 5 MG tablet Take 5 mg by mouth 2 times daily.       cetirizine (ZYRTEC) 10 MG tablet Take 1 tablet (10 mg) by mouth daily 30 tablet 0     Cholecalciferol (D3 HIGH POTENCY) 25 MCG (1000 UT) CAPS Take 50 mcg by mouth daily       clonazePAM (KLONOPIN) 0.5 MG tablet Take 1 tablet (0.5 mg) by mouth daily as needed for anxiety 7 tablet 0     cyanocobalamin (VITAMIN B-12) 1000 MCG tablet Take 1,000 mcg by mouth daily.       ferrous sulfate (FEROSUL) 325 (65 Fe) MG tablet Take 1 tablet (325 mg) by mouth daily (with breakfast) 30 tablet 0     hydrOXYzine HCl (ATARAX) 25 MG tablet Take 25 mg by mouth every 8 hours as needed for anxiety.       MOUNJARO 5 MG/0.5ML SOAJ Inject 0.5 mLs (5 mg) subcutaneously every 7 days. 2 mL 2     norethindrone (AYGESTIN) 5 MG tablet Take 5 mg by mouth daily       polyethylene glycol (MIRALAX) 17 GM/Dose powder Take 17 g by mouth daily as needed for constipation       pregabalin (LYRICA) 100 MG capsule Take 100 mg by mouth 3 times daily. One table in  the morning and two tablets at night       Prenatal Vit-Fe Fumarate-FA (PRENATAL MULTIVITAMIN W/IRON) 27-0.8 MG tablet Take 1 tablet by mouth daily. 90 tablet 3     PSYLLIUM PO Take 1 tsp by mouth daily as needed (as needed).       Sodium Phosphates (FLEET ENEMA) ENEM Place 1 enema rectally as needed (as needed).       tiZANidine (ZANAFLEX) 4 MG tablet Take 4 mg by mouth at bedtime.             5/26/2025     4:02 PM   Weight Loss Medication History Reviewed With Patient   Which weight loss medications are you currently taking on a regular basis? None   If you are not taking a weight loss medication that was prescribed to you, please indicate why: Other   Are you having any side effects from the weight loss medication that we have prescribed you? No       Admission on 05/20/2025, Discharged on 05/20/2025   Component Date Value Ref Range Status     Sodium 05/20/2025 138  135 - 145 mmol/L Final     Potassium 05/20/2025 4.0  3.4 - 5.3 mmol/L Final     Carbon Dioxide (CO2) 05/20/2025 22  22 - 29 mmol/L Final     Anion Gap 05/20/2025 12  7 - 15 mmol/L Final     Urea Nitrogen 05/20/2025 11.7  6.0 - 20.0 mg/dL Final     Creatinine 05/20/2025 0.55  0.51 - 0.95 mg/dL Final     GFR Estimate 05/20/2025 >90  >60 mL/min/1.73m2 Final    eGFR calculated using 2021 CKD-EPI equation.     Calcium 05/20/2025 9.7  8.8 - 10.4 mg/dL Final     Chloride 05/20/2025 104  98 - 107 mmol/L Final     Glucose 05/20/2025 166 (H)  70 - 99 mg/dL Final     Alkaline Phosphatase 05/20/2025 76  40 - 150 U/L Final     AST 05/20/2025 17  0 - 45 U/L Final     ALT 05/20/2025 41  0 - 50 U/L Final     Protein Total 05/20/2025 7.3  6.4 - 8.3 g/dL Final     Albumin 05/20/2025 4.6  3.5 - 5.2 g/dL Final     Bilirubin Total 05/20/2025 0.2  <=1.2 mg/dL Final     Lipase 05/20/2025 31  13 - 60 U/L Final     WBC Count 05/20/2025 11.1 (H)  4.0 - 11.0 10e3/uL Final     RBC Count 05/20/2025 5.00  3.80 - 5.20 10e6/uL Final     Hemoglobin 05/20/2025 15.1  11.7 - 15.7 g/dL  "Final     Hematocrit 05/20/2025 44.0  35.0 - 47.0 % Final     MCV 05/20/2025 88  78 - 100 fL Final     MCH 05/20/2025 30.2  26.5 - 33.0 pg Final     MCHC 05/20/2025 34.3  31.5 - 36.5 g/dL Final     RDW 05/20/2025 13.5  10.0 - 15.0 % Final     Platelet Count 05/20/2025 342  150 - 450 10e3/uL Final     % Neutrophils 05/20/2025 87  % Final     % Lymphocytes 05/20/2025 9  % Final     % Monocytes 05/20/2025 3  % Final     % Eosinophils 05/20/2025 0  % Final     % Basophils 05/20/2025 0  % Final     % Immature Granulocytes 05/20/2025 1  % Final     NRBCs per 100 WBC 05/20/2025 0  <1 /100 Final     Absolute Neutrophils 05/20/2025 9.6 (H)  1.6 - 8.3 10e3/uL Final     Absolute Lymphocytes 05/20/2025 1.0  0.8 - 5.3 10e3/uL Final     Absolute Monocytes 05/20/2025 0.4  0.0 - 1.3 10e3/uL Final     Absolute Eosinophils 05/20/2025 0.0  0.0 - 0.7 10e3/uL Final     Absolute Basophils 05/20/2025 0.0  0.0 - 0.2 10e3/uL Final     Absolute Immature Granulocytes 05/20/2025 0.1  <=0.4 10e3/uL Final     Absolute NRBCs 05/20/2025 0.0  10e3/uL Final     Hold Specimen 05/20/2025 [facility]   Final     Hold Specimen 05/20/2025 [facility]   Final     hCG Quantitative 05/20/2025 <1  <5 mIU/mL Final    Adult: 0-5 mIU/mL for healthy non-pregnant person  Neonates: Should be within normal ranges by 2 days after birth             9/13/2023    10:26 AM   BRIAN Score (Last Two)   BRIAN Raw Score 37   Activation Score 79.2   BRIAN Level 4         PHYSICAL EXAM:  Objective   Ht 1.575 m (5' 2.01\")   Wt 100.7 kg (222 lb)   BMI 40.59 kg/m             Vitals:  No vitals were obtained today due to virtual visit.    GENERAL: alert and no distress  EYES: Eyes grossly normal to inspection.  No discharge or erythema, or obvious scleral/conjunctival abnormalities.  RESP: No audible wheeze, cough, or visible cyanosis.    SKIN: Visible skin clear. No significant rash, abnormal pigmentation or lesions.  NEURO: Cranial nerves grossly intact.  Mentation and speech appropriate for " age.  PSYCH: Appropriate affect, tone, and pace of words        Sincerely,    Sharon Toro NP      50 minutes spent by me on the date of the encounter doing chart review, history and exam, documentation and further activities per the note    The longitudinal plan of care for the diagnosis(es)/condition(s) as documented were addressed during this visit. Due to the added complexity in care, I will continue to support Nevin in the subsequent management and with ongoing continuity of care.    Virtual Visit Details    Type of service:  Video Visit   Video Start Time: 1229  Video End Time:1304    Originating Location (pt. Location): Home    Distant Location (provider location):  Off-site  Platform used for Video Visit: Thuy          Again, thank you for allowing me to participate in the care of your patient.      Sincerely,    Sharon Toro NP

## 2025-05-28 NOTE — PATIENT INSTRUCTIONS
Restart mounjaro 5mg x 3 weeks then increase to 7.5mg   Try switching to crystal light   Avoid liquid calories when possible   Continue working on meal prepping   Try nystatin in skin folds

## 2025-05-28 NOTE — PROGRESS NOTES
Return Medical Weight Management Note     Nevin Alvarado  MRN:  [id number]  :  1990  MOR:  2025    Dear Latonya Knight MD,    I had the pleasure of seeing your patient Nevin Alvarado. She is a 34 year old female who I am continuing to see for treatment of obesity related to:        2023    12:48 PM   --   I have the following health issues associated with obesity Type II Diabetes    High Blood Pressure    Sleep Apnea    Polycystic Ovarian Syndrome    GERD (Reflux)    Fatty Liver    Asthma    Stress Incontinence       Assessment & Plan   Problem List Items Addressed This Visit          Digestive    Class 3 severe obesity with serious comorbidity and body mass index (BMI) of 50.0 to 59.9 in adult, unspecified obesity type (H) - Primary    Has had numerous procedures requiring pause in mounjaro. Has now been off x 2.5 weeks. When taking 5mg consistently in April was noticing increased hunger and cravings. Difficult to avoid foods she has been able to avoid previously. Cravings making difficult to make healthy choices. Would like to increase dose. Do not recommend increasing until she is taking 5mg consistently for a few weeks. Has 3 pens of 5mg left. Will take those then increase to 7.5mg. did discuss that she is on the edge of being able to restart without dose reduction. May benefit from going down to 2.5mg but she'd like to try at 5mg. Stressed hydration and good nutrition while restarting.     Lots of questions about excess skin removal and rashes. Discussed management of rashes. Explained that skin removal is usually after she is at goal weight due to risk for needing repeat surgery. Discussed insurance does not usually cover skin removal unless impacting quality of life.     Restart mounjaro 5mg x 3 weeks then increase to 7.5mg   Try switching to crystal light   Avoid liquid calories when possible   Continue working on meal prepping   Try nystatin in skin folds           Relevant Medications    Tirzepatide (MOUNJARO) 7.5 MG/0.5ML SOAJ auto-injector pen    nystatin (MYCOSTATIN) [id number] UNIT/GM external cream       Endocrine    Type 2 diabetes mellitus without complication, without long-term current use of insulin (H)    Relevant Medications    Tirzepatide (MOUNJARO) 7.5 MG/0.5ML SOAJ auto-injector pen    nystatin (MYCOSTATIN) [id number] UNIT/GM external cream     Other Visit Diagnoses         Intertriginous candidiasis        Relevant Medications    nystatin (MYCOSTATIN) [id number] UNIT/GM external cream               INTERVAL HISTORY:    NBS 9/2023 BMI 56.5 with DMII, ZOHRA, GERD, and hx of PE. Mental health struggles contributing to weight gain including weight gain associated with medications. This was complicated by numerous episodes of swallowing non food items which has lead to perforations of the esophagus and strictures for which she is followed by GI. Had been stable for several months prior to a relapse just before our consult. We discussed mwm to start given increased risks after bariatric surgery to the stomach. Initially switched rybelsus for DMII to wegovy for better efficacy. Due to insurance change she is now taking ozempic.      Has been having dental issues that are not healing well. Had surgery 1 week ago.   Rolled ankle and broke ankle 4 weeks ago      Was considering hysterectomy/ exp lap for endometriosis in Feb 2025 but then that OB left the system    Last seen 2/2025- switched to mounjaro 5mg, discussed working with MTM, living on own and experiencing some food insecurity       4/2025- referred to gen surg for endometriosis and adhesions, ruling iout premature ovarian failure and early menopause. Takes oral birth control daily without placebo weeks     5/2025- ED visit for RLQ pain, CT/ lab work up negative for bowel obstruction, renal calculi, or other cause for pain. Didn't feel constipated, takes colace daily. Also followed by [facility] - last seen today at [facility] for  hemorrhoid symptom - now will see colorectal surgeon for a thrombosed hemorrhoid.     Intermittent nausea, burping- not better when off tirzepatide. EGD yesterday - biopsies pending (inflammation noted).     Has not been on it consistently for several weeks. When taking consistently in April- had increased hunger and cravings     Anti-obesity medication history    Current:   Mounjaro 5mg - off x 3 weeks for EGD today     Past/Failed/contraindicated:   Ozempic, disinterest in food at 1mg, severe constipation with dose increase     Recent diet changes:   Lots of cravings   More juice than water   Doing some protein shakes   Prioritizing protein   Got increase in EBT funds   Getting some home delivery meals     Recent exercise/activity changes:   Knee surgery- recovering well, moving around more     Recent stressors:   SW through [facility]     Vitamins/Labs: 5/2025     Excess skin/ rashes:   Under abdominal skin- cracks in creases  Under arms   On side of breasts  Gluteal crease   Lots of questions today about skin removal surgery     Not sexually active with male partners     CURRENT WEIGHT:   222 lbs 0 oz    Initial Weight (lbs): 309 lbs  Last Visits Weight: 102.1 kg (225 lb)  Cumulative weight loss (lbs): 87  Weight Loss Percentage: 28.16%        5/26/2025     4:02 PM   Changes and Difficulties   I have made the following changes to my diet since my last visit: Added more protein sources theoughout the day   With regards to my diet, I am still struggling with: Finding bew recipes to try so i dont get burnt out on the things i want to enjoy   I have made the following changes to my activity/exercise since my last visit: More walking and physical activity   With regards to my activity/exercise, I am still struggling with: Pain         MEDICATIONS:   Current Outpatient Medications   Medication Sig Dispense Refill    nystatin (MYCOSTATIN) [id number] UNIT/GM external cream Apply topically 2 times daily as needed for dry skin.  30 g 1    Tirzepatide (MOUNJARO) 7.5 MG/0.5ML SOAJ auto-injector pen Inject 0.5 mLs (7.5 mg) subcutaneously once a week. 2 mL 2    albuterol (PROAIR HFA/PROVENTIL HFA/VENTOLIN HFA) 108 (90 Base) MCG/ACT inhaler Inhale 2 puffs into the lungs every 6 hours as needed for shortness of breath / dyspnea or wheezing 18 g 0    albuterol (PROVENTIL) (2.5 MG/3ML) 0.083% neb solution Take 1 vial (2.5 mg) by nebulization every 6 hours as needed for shortness of breath or wheezing 90 mL 0    amitriptyline (ELAVIL) 25 MG tablet Take 35 mg by mouth at bedtime. 10 mg plus 25 mg = 35 mg      apixaban ANTICOAGULANT (ELIQUIS) 5 MG tablet Take 5 mg by mouth 2 times daily.      cetirizine (ZYRTEC) 10 MG tablet Take 1 tablet (10 mg) by mouth daily 30 tablet 0    Cholecalciferol (D3 HIGH POTENCY) 25 MCG (1000 UT) CAPS Take 50 mcg by mouth daily      clonazePAM (KLONOPIN) 0.5 MG tablet Take 1 tablet (0.5 mg) by mouth daily as needed for anxiety 7 tablet 0    cyanocobalamin (VITAMIN B-12) 1000 MCG tablet Take 1,000 mcg by mouth daily.      ferrous sulfate (FEROSUL) 325 (65 Fe) MG tablet Take 1 tablet (325 mg) by mouth daily (with breakfast) 30 tablet 0    hydrOXYzine HCl (ATARAX) 25 MG tablet Take 25 mg by mouth every 8 hours as needed for anxiety.      MOUNJARO 5 MG/0.5ML SOAJ Inject 0.5 mLs (5 mg) subcutaneously every 7 days. 2 mL 2    norethindrone (AYGESTIN) 5 MG tablet Take 5 mg by mouth daily      polyethylene glycol (MIRALAX) 17 GM/Dose powder Take 17 g by mouth daily as needed for constipation      pregabalin (LYRICA) 100 MG capsule Take 100 mg by mouth 3 times daily. One table in the morning and two tablets at night      Prenatal Vit-Fe Fumarate-FA (PRENATAL MULTIVITAMIN W/IRON) 27-0.8 MG tablet Take 1 tablet by mouth daily. 90 tablet 3    PSYLLIUM PO Take 1 tsp by mouth daily as needed (as needed).      Sodium Phosphates (FLEET ENEMA) ENEM Place 1 enema rectally as needed (as needed).      tiZANidine (ZANAFLEX) 4 MG tablet Take 4  mg by mouth at bedtime.             5/26/2025     4:02 PM   Weight Loss Medication History Reviewed With Patient   Which weight loss medications are you currently taking on a regular basis? None   If you are not taking a weight loss medication that was prescribed to you, please indicate why: Other   Are you having any side effects from the weight loss medication that we have prescribed you? No       Admission on 05/20/2025, Discharged on 05/20/2025   Component Date Value Ref Range Status    Sodium 05/20/2025 138  135 - 145 mmol/L Final    Potassium 05/20/2025 4.0  3.4 - 5.3 mmol/L Final    Carbon Dioxide (CO2) 05/20/2025 22  22 - 29 mmol/L Final    Anion Gap 05/20/2025 12  7 - 15 mmol/L Final    Urea Nitrogen 05/20/2025 11.7  6.0 - 20.0 mg/dL Final    Creatinine 05/20/2025 0.55  0.51 - 0.95 mg/dL Final    GFR Estimate 05/20/2025 >90  >60 mL/min/1.73m2 Final    eGFR calculated using 2021 CKD-EPI equation.    Calcium 05/20/2025 9.7  8.8 - 10.4 mg/dL Final    Chloride 05/20/2025 104  98 - 107 mmol/L Final    Glucose 05/20/2025 166 (H)  70 - 99 mg/dL Final    Alkaline Phosphatase 05/20/2025 76  40 - 150 U/L Final    AST 05/20/2025 17  0 - 45 U/L Final    ALT 05/20/2025 41  0 - 50 U/L Final    Protein Total 05/20/2025 7.3  6.4 - 8.3 g/dL Final    Albumin 05/20/2025 4.6  3.5 - 5.2 g/dL Final    Bilirubin Total 05/20/2025 0.2  <=1.2 mg/dL Final    Lipase 05/20/2025 31  13 - 60 U/L Final    WBC Count 05/20/2025 11.1 (H)  4.0 - 11.0 10e3/uL Final    RBC Count 05/20/2025 5.00  3.80 - 5.20 10e6/uL Final    Hemoglobin 05/20/2025 15.1  11.7 - 15.7 g/dL Final    Hematocrit 05/20/2025 44.0  35.0 - 47.0 % Final    MCV 05/20/2025 88  78 - 100 fL Final    MCH 05/20/2025 30.2  26.5 - 33.0 pg Final    MCHC 05/20/2025 34.3  31.5 - 36.5 g/dL Final    RDW 05/20/2025 13.5  10.0 - 15.0 % Final    Platelet Count 05/20/2025 342  150 - 450 10e3/uL Final    % Neutrophils 05/20/2025 87  % Final    % Lymphocytes 05/20/2025 9  % Final    %  "Monocytes 05/20/2025 3  % Final    % Eosinophils 05/20/2025 0  % Final    % Basophils 05/20/2025 0  % Final    % Immature Granulocytes 05/20/2025 1  % Final    NRBCs per 100 WBC 05/20/2025 0  <1 /100 Final    Absolute Neutrophils 05/20/2025 9.6 (H)  1.6 - 8.3 10e3/uL Final    Absolute Lymphocytes 05/20/2025 1.0  0.8 - 5.3 10e3/uL Final    Absolute Monocytes 05/20/2025 0.4  0.0 - 1.3 10e3/uL Final    Absolute Eosinophils 05/20/2025 0.0  0.0 - 0.7 10e3/uL Final    Absolute Basophils 05/20/2025 0.0  0.0 - 0.2 10e3/uL Final    Absolute Immature Granulocytes 05/20/2025 0.1  <=0.4 10e3/uL Final    Absolute NRBCs 05/20/2025 0.0  10e3/uL Final    Hold Specimen 05/20/2025 JIC   Final    Hold Specimen 05/20/2025 JIC   Final    hCG Quantitative 05/20/2025 <1  <5 mIU/mL Final    Adult: 0-5 mIU/mL for healthy non-pregnant person  Neonates: Should be within normal ranges by 2 days after birth             9/13/2023    10:26 AM   BRIAN Score (Last Two)   BRIAN Raw Score 37   Activation Score 79.2   BRIAN Level 4         PHYSICAL EXAM:  Objective    Ht 1.575 m (5' 2.01\")   Wt 100.7 kg (222 lb)   BMI 40.59 kg/m             Vitals:  No vitals were obtained today due to virtual visit.    GENERAL: alert and no distress  EYES: Eyes grossly normal to inspection.  No discharge or erythema, or obvious scleral/conjunctival abnormalities.  RESP: No audible wheeze, cough, or visible cyanosis.    SKIN: Visible skin clear. No significant rash, abnormal pigmentation or lesions.  NEURO: Cranial nerves grossly intact.  Mentation and speech appropriate for age.  PSYCH: Appropriate affect, tone, and pace of words        Sincerely,    Sharon Toro NP      50 minutes spent by me on the date of the encounter doing chart review, history and exam, documentation and further activities per the note    The longitudinal plan of care for the diagnosis(es)/condition(s) as documented were addressed during this visit. Due to the added complexity in care, I will " continue to support Nevin in the subsequent management and with ongoing continuity of care.

## 2025-05-28 NOTE — PROGRESS NOTES
Virtual Visit Details    Type of service:  Video Visit   Video Start Time: 1229  Video End Time:1304    Originating Location (pt. Location): Home    Distant Location (provider location):  Off-site  Platform used for Video Visit: Enliken

## 2025-05-29 ENCOUNTER — TRANSCRIBE ORDERS (OUTPATIENT)
Dept: OTHER | Age: 35
End: 2025-05-29

## 2025-05-29 DIAGNOSIS — G61.81 CHRONIC INFLAMMATORY DEMYELINATING POLYRADICULONEUROPATHY (H): Primary | ICD-10-CM

## 2025-05-29 NOTE — ASSESSMENT & PLAN NOTE
Has had numerous procedures requiring pause in mounjaro. Has now been off x 2.5 weeks. When taking 5mg consistently in April was noticing increased hunger and cravings. Difficult to avoid foods she has been able to avoid previously. Cravings making difficult to make healthy choices. Would like to increase dose. Do not recommend increasing until she is taking 5mg consistently for a few weeks. Has 3 pens of 5mg left. Will take those then increase to 7.5mg. did discuss that she is on the edge of being able to restart without dose reduction. May benefit from going down to 2.5mg but she'd like to try at 5mg. Stressed hydration and good nutrition while restarting.     Lots of questions about excess skin removal and rashes. Discussed management of rashes. Explained that skin removal is usually after she is at goal weight due to risk for needing repeat surgery. Discussed insurance does not usually cover skin removal unless impacting quality of life.     Restart mounjaro 5mg x 3 weeks then increase to 7.5mg   Try switching to crystal light   Avoid liquid calories when possible   Continue working on meal prepping   Try nystatin in skin folds

## 2025-06-01 ENCOUNTER — HOSPITAL ENCOUNTER (EMERGENCY)
Facility: CLINIC | Age: 35
Discharge: HOME | End: 2025-06-01
Attending: EMERGENCY MEDICINE | Admitting: EMERGENCY MEDICINE
Payer: COMMERCIAL

## 2025-06-01 VITALS
OXYGEN SATURATION: 100 % | RESPIRATION RATE: 16 BRPM | TEMPERATURE: 98.4 F | WEIGHT: 222 LBS | SYSTOLIC BLOOD PRESSURE: 149 MMHG | HEIGHT: 62 IN | DIASTOLIC BLOOD PRESSURE: 76 MMHG | HEART RATE: 92 BPM | BODY MASS INDEX: 40.85 KG/M2

## 2025-06-01 DIAGNOSIS — R51.9 NONINTRACTABLE EPISODIC HEADACHE, UNSPECIFIED HEADACHE TYPE: ICD-10-CM

## 2025-06-01 DIAGNOSIS — I10 PRIMARY HYPERTENSION: ICD-10-CM

## 2025-06-01 LAB
ANION GAP SERPL CALCULATED.3IONS-SCNC: 10 MMOL/L (ref 7–15)
BASOPHILS # BLD AUTO: 0 10E3/UL (ref 0–0.2)
BASOPHILS NFR BLD AUTO: 0 %
BUN SERPL-MCNC: 12.3 MG/DL (ref 6–20)
CALCIUM SERPL-MCNC: 9.1 MG/DL (ref 8.8–10.4)
CHLORIDE SERPL-SCNC: 104 MMOL/L (ref 98–107)
CREAT SERPL-MCNC: 0.6 MG/DL (ref 0.51–0.95)
EGFRCR SERPLBLD CKD-EPI 2021: >90 ML/MIN/1.73M2
EOSINOPHIL # BLD AUTO: 0.2 10E3/UL (ref 0–0.7)
EOSINOPHIL NFR BLD AUTO: 2 %
ERYTHROCYTE [DISTWIDTH] IN BLOOD BY AUTOMATED COUNT: 13.9 % (ref 10–15)
GLUCOSE SERPL-MCNC: 105 MG/DL (ref 70–99)
HCG SERPL QL: NEGATIVE
HCO3 SERPL-SCNC: 25 MMOL/L (ref 22–29)
HCT VFR BLD AUTO: 40.7 % (ref 35–47)
HGB BLD-MCNC: 14 G/DL (ref 11.7–15.7)
IMM GRANULOCYTES # BLD: 0 10E3/UL
IMM GRANULOCYTES NFR BLD: 0 %
LYMPHOCYTES # BLD AUTO: 2 10E3/UL (ref 0.8–5.3)
LYMPHOCYTES NFR BLD AUTO: 19 %
MCH RBC QN AUTO: 30.8 PG (ref 26.5–33)
MCHC RBC AUTO-ENTMCNC: 34.4 G/DL (ref 31.5–36.5)
MCV RBC AUTO: 90 FL (ref 78–100)
MONOCYTES # BLD AUTO: 0.6 10E3/UL (ref 0–1.3)
MONOCYTES NFR BLD AUTO: 5 %
NEUTROPHILS # BLD AUTO: 7.6 10E3/UL (ref 1.6–8.3)
NEUTROPHILS NFR BLD AUTO: 73 %
NRBC # BLD AUTO: 0 10E3/UL
NRBC BLD AUTO-RTO: 0 /100
PLATELET # BLD AUTO: 237 10E3/UL (ref 150–450)
POTASSIUM SERPL-SCNC: 3.9 MMOL/L (ref 3.4–5.3)
RBC # BLD AUTO: 4.54 10E6/UL (ref 3.8–5.2)
SODIUM SERPL-SCNC: 139 MMOL/L (ref 135–145)
WBC # BLD AUTO: 10.4 10E3/UL (ref 4–11)

## 2025-06-01 PROCEDURE — 84703 CHORIONIC GONADOTROPIN ASSAY: CPT | Performed by: EMERGENCY MEDICINE

## 2025-06-01 PROCEDURE — 96375 TX/PRO/DX INJ NEW DRUG ADDON: CPT

## 2025-06-01 PROCEDURE — 36415 COLL VENOUS BLD VENIPUNCTURE: CPT | Performed by: EMERGENCY MEDICINE

## 2025-06-01 PROCEDURE — 250N000011 HC RX IP 250 OP 636: Performed by: EMERGENCY MEDICINE

## 2025-06-01 PROCEDURE — 85041 AUTOMATED RBC COUNT: CPT | Performed by: EMERGENCY MEDICINE

## 2025-06-01 PROCEDURE — 96366 THER/PROPH/DIAG IV INF ADDON: CPT

## 2025-06-01 PROCEDURE — 82310 ASSAY OF CALCIUM: CPT | Performed by: EMERGENCY MEDICINE

## 2025-06-01 PROCEDURE — 258N000003 HC RX IP 258 OP 636: Performed by: EMERGENCY MEDICINE

## 2025-06-01 PROCEDURE — 96365 THER/PROPH/DIAG IV INF INIT: CPT

## 2025-06-01 PROCEDURE — 99284 EMERGENCY DEPT VISIT MOD MDM: CPT | Mod: 25

## 2025-06-01 RX ORDER — OLANZAPINE 10 MG/2ML
10 INJECTION, POWDER, FOR SOLUTION INTRAMUSCULAR ONCE
Status: COMPLETED | OUTPATIENT
Start: 2025-06-01 | End: 2025-06-01

## 2025-06-01 RX ORDER — MAGNESIUM SULFATE HEPTAHYDRATE 40 MG/ML
2 INJECTION, SOLUTION INTRAVENOUS ONCE
Status: COMPLETED | OUTPATIENT
Start: 2025-06-01 | End: 2025-06-01

## 2025-06-01 RX ORDER — LORAZEPAM 2 MG/ML
1 INJECTION INTRAMUSCULAR ONCE
Status: COMPLETED | OUTPATIENT
Start: 2025-06-01 | End: 2025-06-01

## 2025-06-01 RX ADMIN — LORAZEPAM 1 MG: 2 INJECTION INTRAMUSCULAR; INTRAVENOUS at 20:04

## 2025-06-01 RX ADMIN — MAGNESIUM SULFATE HEPTAHYDRATE 2 G: 40 INJECTION, SOLUTION INTRAVENOUS at 20:04

## 2025-06-01 RX ADMIN — SODIUM CHLORIDE 1000 ML: 9 INJECTION, SOLUTION INTRAVENOUS at 20:04

## 2025-06-01 ASSESSMENT — ACTIVITIES OF DAILY LIVING (ADL)
ADLS_ACUITY_SCORE: 67

## 2025-06-01 NOTE — ED TRIAGE NOTES
Pt arrives via EMS w/ complaints of migraine x1 wk. Pt states HA got worse 1 hr PTA, pt took tylenol, ibuprofen, used ice and heat without relief. Pt has hx of migraines. EMS gave 4 mg ODT zofran. Pt is describing HA as worse HA of her life. Pt provided RN w/ life of symptoms consisting of nausea, sudden onset, pain will spike within 60 seconds of appearing, pain will disappear and spike again, extreme fatigue, pressure feeling, etc. Pt states the zofran given by EMS did not help with nausea. Pt states she takes eloquist twice a day for hx of PE.

## 2025-06-02 NOTE — ED PROVIDER NOTES
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 g in 50 mL sterile water intermittent infusion (2 g Intravenous $New Bag 6/1/25 2004)   sodium chloride 0.9% BOLUS 1,000 mL (1,000 mLs Intravenous $New Bag 6/1/25 2004)       Procedures   Procedures     Discussion of  Management   None    ED Course   ED Course as of 06/01/25 2153   Sun Jun 01, 2025   1900 I obtained history and examined the patient as noted above.     2044 I rechecked the patient for discharge.        Additional Documentation  None    Medical Decision Making / Diagnosis     CMS Diagnoses: None    MIPS   None               [facility]     Nevin Alvarado is a 34 year old female with history of migraine and multifactorial headache presents with intermittent headache.  History and evaluation not consistent with intracranial hemorrhage, mass, dural sinus thrombosis, meningitis, temporal arteritis, glaucoma or idiopathic intracranial hypertension.  Laboratory studies are unremarkable.  Patient was given analgesics as noted above.  She declined Zyprexa or valproic acid.  She was requesting MRI but I see no indication for MRI or CT scan at this time.  Unfortunately patient is at significant advanced imaging in the past and further radiation risk with CT scan outweighs benefit at this time.  Although she may require MRI for long-term management of her problems including CIDP, there is no indication for emergent MRI.  Patient ultimately comfortable with plan of withholding imaging and will contact providers tomorrow for her scheduled outpatient imaging.  Return to ED for worsening symptoms.    Disposition   The patient was discharged.     Diagnosis     ICD-10-CM    1. Nonintractable episodic headache, unspecified headache type  R51.9       2. Primary hypertension  I10            Discharge Medications   New Prescriptions    No medications on file         Scribe Disclosure:  I, Dejon Jimenez, am serving as a scribe at 7:28 PM on 6/1/2025 to document services personally performed by Cheo Gamez MD based on my observations and the provider's statements to me.     I, Melly Koenig, am serving as a scribe  at 7:28 PM on 6/1/2025 to document services personally performed by Cheo Gamez MD based on my  observations and the provider's statements to me.        Cheo Gamez MD  06/01/25 5938

## 2025-06-03 ENCOUNTER — TELEPHONE (OUTPATIENT)
Dept: ENDOCRINOLOGY | Facility: CLINIC | Age: 35
End: 2025-06-03
Payer: COMMERCIAL

## 2025-06-03 DIAGNOSIS — E11.9 TYPE 2 DIABETES MELLITUS WITHOUT COMPLICATION, WITHOUT LONG-TERM CURRENT USE OF INSULIN (H): Primary | ICD-10-CM

## 2025-06-03 NOTE — TELEPHONE ENCOUNTER
Symptoms    Describe your symptoms: Pt. Just saw Sharon last week. Pt. talked to her about her Mounjaro and said she could go up a dose, however, Pt. Had missed a couple weeks and is taking 5mg dosage and is feeling really sick nauseated. Pt. Would like to go back to the 2.5 mg dosage to alleviate her symptoms.    Any pain: Yes: Pt.'s stomach is hurtin 6/10 on a 0-10 pain scale. Pt. Declines nurse triage.     How long have you been having symptoms: 5am this morning after an injection on Sunday   days    Have you been seen for this:  Yes: Pt. ,saw Sharon last week.     Appointment offered?: No    Triage offered?: Yes: pt. declined    Home remedies tried: yes, did not work    Preferred Pharmacy:   [id number] [place] [id number] [place] [address] AT [address]  [address] [facility] [address]  Phone: [phone] Fax: [phone]      Could we send this information to you in [employer] or would you prefer to receive a phone call?:   Patient would prefer a phone call   Okay to leave a detailed message?: Yes at Cell number on file:    Telephone Information:   Mobile [phone]

## 2025-06-03 NOTE — TELEPHONE ENCOUNTER
Patient was off Mounjaro for 2.5 weeks and restarted Mounjaro 5 mg on Sunday. She woke up today with severe abdominal cramping, nausea and dry heaving, also having heartburn. Took Gas-x, zofran and reglan to help with symptoms, still not feeling well.  Saw Sharon Toro 5/28 and was planning to increase her Mounjaro dose so she picked up the 7.5 mg from her pharmacy. Now thinks she should start over at the 2.5 mg dose.  Advised patient to try to push fluids. Has zofran and Reglan Rx to treat symptoms.   Discussed with patient if Mounjaro 2.5 mg sent to pharmacy she should not restart for at least a week AND when she is feeling better. Patient verbalized understanding.  Will inform Sharon Toro CNP of above.

## 2025-06-03 NOTE — TELEPHONE ENCOUNTER
Patient confirmed scheduled appointment:     Date: 6/26/25  Time: 2pm  Visit type: Return Weight Management  Visit mode: Virtual Visit  Provider:  Sharon Toro NP  [place] [facility] [place]    Additional Notes:   Med side effects

## 2025-06-09 ENCOUNTER — HOSPITAL ENCOUNTER (EMERGENCY)
Facility: CLINIC | Age: 35
Discharge: HOME | End: 2025-06-10
Payer: COMMERCIAL

## 2025-06-09 ENCOUNTER — HOSPITAL ENCOUNTER (EMERGENCY)
Facility: CLINIC | Age: 35
Discharge: HOME | End: 2025-06-09
Attending: EMERGENCY MEDICINE | Admitting: EMERGENCY MEDICINE
Payer: COMMERCIAL

## 2025-06-09 VITALS
BODY MASS INDEX: 41.34 KG/M2 | HEART RATE: 99 BPM | WEIGHT: 226 LBS | RESPIRATION RATE: 18 BRPM | SYSTOLIC BLOOD PRESSURE: 150 MMHG | TEMPERATURE: 97.6 F | OXYGEN SATURATION: 100 % | DIASTOLIC BLOOD PRESSURE: 83 MMHG

## 2025-06-09 DIAGNOSIS — G61.81 CIDP (CHRONIC INFLAMMATORY DEMYELINATING POLYNEUROPATHY) (H): ICD-10-CM

## 2025-06-09 DIAGNOSIS — M79.2 CHRONIC NEUROPATHIC PAIN: ICD-10-CM

## 2025-06-09 DIAGNOSIS — G89.29 CHRONIC NEUROPATHIC PAIN: ICD-10-CM

## 2025-06-09 DIAGNOSIS — R26.9 ABNORMAL GAIT: ICD-10-CM

## 2025-06-09 LAB
ALBUMIN UR-MCNC: NEGATIVE MG/DL
ANION GAP SERPL CALCULATED.3IONS-SCNC: 11 MMOL/L (ref 7–15)
APPEARANCE UR: CLEAR
BACTERIA #/AREA URNS HPF: ABNORMAL /HPF
BASOPHILS # BLD AUTO: 0 10E3/UL (ref 0–0.2)
BASOPHILS NFR BLD AUTO: 0 %
BILIRUB UR QL STRIP: NEGATIVE
BUN SERPL-MCNC: 10.7 MG/DL (ref 6–20)
CALCIUM SERPL-MCNC: 9.6 MG/DL (ref 8.8–10.4)
CHLORIDE SERPL-SCNC: 104 MMOL/L (ref 98–107)
COLOR UR AUTO: ABNORMAL
CREAT SERPL-MCNC: 0.62 MG/DL (ref 0.51–0.95)
EGFRCR SERPLBLD CKD-EPI 2021: >90 ML/MIN/1.73M2
EOSINOPHIL # BLD AUTO: 0.2 10E3/UL (ref 0–0.7)
EOSINOPHIL NFR BLD AUTO: 2 %
ERYTHROCYTE [DISTWIDTH] IN BLOOD BY AUTOMATED COUNT: 13.7 % (ref 10–15)
GLUCOSE SERPL-MCNC: 88 MG/DL (ref 70–99)
GLUCOSE UR STRIP-MCNC: NEGATIVE MG/DL
HCO3 SERPL-SCNC: 25 MMOL/L (ref 22–29)
HCT VFR BLD AUTO: 41 % (ref 35–47)
HGB BLD-MCNC: 14.2 G/DL (ref 11.7–15.7)
HGB UR QL STRIP: NEGATIVE
IMM GRANULOCYTES # BLD: 0 10E3/UL
IMM GRANULOCYTES NFR BLD: 0 %
KETONES UR STRIP-MCNC: NEGATIVE MG/DL
LEUKOCYTE ESTERASE UR QL STRIP: NEGATIVE
LYMPHOCYTES # BLD AUTO: 2.2 10E3/UL (ref 0.8–5.3)
LYMPHOCYTES NFR BLD AUTO: 23 %
MCH RBC QN AUTO: 30.7 PG (ref 26.5–33)
MCHC RBC AUTO-ENTMCNC: 34.6 G/DL (ref 31.5–36.5)
MCV RBC AUTO: 89 FL (ref 78–100)
MONOCYTES # BLD AUTO: 0.4 10E3/UL (ref 0–1.3)
MONOCYTES NFR BLD AUTO: 5 %
MUCOUS THREADS #/AREA URNS LPF: PRESENT /LPF
NEUTROPHILS # BLD AUTO: 6.5 10E3/UL (ref 1.6–8.3)
NEUTROPHILS NFR BLD AUTO: 70 %
NITRATE UR QL: NEGATIVE
NRBC # BLD AUTO: 0 10E3/UL
NRBC BLD AUTO-RTO: 0 /100
PH UR STRIP: 6 [PH] (ref 5–7)
PLATELET # BLD AUTO: 293 10E3/UL (ref 150–450)
POTASSIUM SERPL-SCNC: 3.6 MMOL/L (ref 3.4–5.3)
RBC # BLD AUTO: 4.62 10E6/UL (ref 3.8–5.2)
RBC URINE: 1 /HPF
SODIUM SERPL-SCNC: 140 MMOL/L (ref 135–145)
SP GR UR STRIP: 1.02 (ref 1–1.03)
SQUAMOUS EPITHELIAL: <1 /HPF
UROBILINOGEN UR STRIP-MCNC: NORMAL MG/DL
WBC # BLD AUTO: 9.4 10E3/UL (ref 4–11)
WBC URINE: 2 /HPF

## 2025-06-09 PROCEDURE — 99283 EMERGENCY DEPT VISIT LOW MDM: CPT

## 2025-06-09 PROCEDURE — 36415 COLL VENOUS BLD VENIPUNCTURE: CPT | Performed by: EMERGENCY MEDICINE

## 2025-06-09 PROCEDURE — 80048 BASIC METABOLIC PNL TOTAL CA: CPT | Performed by: EMERGENCY MEDICINE

## 2025-06-09 PROCEDURE — 81001 URINALYSIS AUTO W/SCOPE: CPT | Performed by: EMERGENCY MEDICINE

## 2025-06-09 PROCEDURE — 85025 COMPLETE CBC W/AUTO DIFF WBC: CPT | Performed by: EMERGENCY MEDICINE

## 2025-06-09 ASSESSMENT — ACTIVITIES OF DAILY LIVING (ADL)
ADLS_ACUITY_SCORE: 67

## 2025-06-09 NOTE — ED TRIAGE NOTES
Presents in a wheelchair with complaint of bilateral leg pain and weakness for a long time. Patient states a long history of neuropathy. Patient states she feels like she is drunk when she tries to walk due to the pain and weakness.          No abnormalities

## 2025-06-09 NOTE — ED TRIAGE NOTES
Pt BIBA from apartment, with c/o increasing neuropathy BLE. Neurology appointment 6/30. Dysuria and reports constipation. No interventions completed PTA.

## 2025-06-10 ENCOUNTER — APPOINTMENT (OUTPATIENT)
Dept: GENERAL RADIOLOGY | Facility: CLINIC | Age: 35
DRG: 074 | End: 2025-06-10
Attending: PHYSICIAN ASSISTANT
Payer: COMMERCIAL

## 2025-06-10 ENCOUNTER — HOSPITAL ENCOUNTER (INPATIENT)
Facility: CLINIC | Age: 35
End: 2025-06-10
Attending: EMERGENCY MEDICINE | Admitting: STUDENT IN AN ORGANIZED HEALTH CARE EDUCATION/TRAINING PROGRAM
Payer: COMMERCIAL

## 2025-06-10 DIAGNOSIS — G61.81 CIDP (CHRONIC INFLAMMATORY DEMYELINATING POLYNEUROPATHY) (H): Primary | ICD-10-CM

## 2025-06-10 DIAGNOSIS — R29.898 LEG WEAKNESS, BILATERAL: ICD-10-CM

## 2025-06-10 DIAGNOSIS — G61.81 CHRONIC INFLAMMATORY DEMYELINATING POLYNEUROPATHY (H): ICD-10-CM

## 2025-06-10 LAB
ATRIAL RATE - MUSE: 91 BPM
CRP SERPL-MCNC: 20.82 MG/L
DIASTOLIC BLOOD PRESSURE - MUSE: NORMAL MMHG
ERYTHROCYTE [SEDIMENTATION RATE] IN BLOOD BY WESTERGREN METHOD: >140 MM/HR (ref 0–20)
INTERPRETATION ECG - MUSE: NORMAL
P AXIS - MUSE: 38 DEGREES
PR INTERVAL - MUSE: 146 MS
QRS DURATION - MUSE: 74 MS
QT - MUSE: 348 MS
QTC - MUSE: 428 MS
R AXIS - MUSE: 66 DEGREES
SYSTOLIC BLOOD PRESSURE - MUSE: NORMAL MMHG
T AXIS - MUSE: 17 DEGREES
TROPONIN T SERPL HS-MCNC: 8 NG/L
TROPONIN T SERPL HS-MCNC: 9 NG/L
VENTRICULAR RATE- MUSE: 91 BPM

## 2025-06-10 PROCEDURE — 84484 ASSAY OF TROPONIN QUANT: CPT | Performed by: PHYSICIAN ASSISTANT

## 2025-06-10 PROCEDURE — 250N000013 HC RX MED GY IP 250 OP 250 PS 637: Performed by: STUDENT IN AN ORGANIZED HEALTH CARE EDUCATION/TRAINING PROGRAM

## 2025-06-10 PROCEDURE — 99222 1ST HOSP IP/OBS MODERATE 55: CPT

## 2025-06-10 PROCEDURE — 36415 COLL VENOUS BLD VENIPUNCTURE: CPT | Performed by: PHYSICIAN ASSISTANT

## 2025-06-10 PROCEDURE — 99223 1ST HOSP IP/OBS HIGH 75: CPT | Performed by: PHYSICIAN ASSISTANT

## 2025-06-10 PROCEDURE — 85652 RBC SED RATE AUTOMATED: CPT | Performed by: PHYSICIAN ASSISTANT

## 2025-06-10 PROCEDURE — 250N000013 HC RX MED GY IP 250 OP 250 PS 637

## 2025-06-10 PROCEDURE — 86140 C-REACTIVE PROTEIN: CPT | Performed by: PHYSICIAN ASSISTANT

## 2025-06-10 PROCEDURE — 250N000011 HC RX IP 250 OP 636: Mod: JZ | Performed by: PHYSICIAN ASSISTANT

## 2025-06-10 PROCEDURE — 74019 RADEX ABDOMEN 2 VIEWS: CPT

## 2025-06-10 PROCEDURE — 99285 EMERGENCY DEPT VISIT HI MDM: CPT

## 2025-06-10 PROCEDURE — 250N000013 HC RX MED GY IP 250 OP 250 PS 637: Performed by: PHYSICIAN ASSISTANT

## 2025-06-10 PROCEDURE — 120N000001 HC R&B MED SURG/OB

## 2025-06-10 RX ORDER — PANTOPRAZOLE SODIUM 40 MG/1
40 TABLET, DELAYED RELEASE ORAL DAILY
Status: DISCONTINUED | OUTPATIENT
Start: 2025-06-11 | End: 2025-06-19 | Stop reason: HOSPADM

## 2025-06-10 RX ORDER — PREGABALIN 100 MG/1
100 CAPSULE ORAL
Status: DISCONTINUED | OUTPATIENT
Start: 2025-06-11 | End: 2025-06-19 | Stop reason: HOSPADM

## 2025-06-10 RX ORDER — CETIRIZINE HYDROCHLORIDE 10 MG/1
10 TABLET ORAL 2 TIMES DAILY
COMMUNITY

## 2025-06-10 RX ORDER — ACETAMINOPHEN 325 MG/1
650 TABLET ORAL EVERY 4 HOURS PRN
Status: DISCONTINUED | OUTPATIENT
Start: 2025-06-10 | End: 2025-06-19 | Stop reason: HOSPADM

## 2025-06-10 RX ORDER — SUMATRIPTAN SUCCINATE 25 MG/1
25 TABLET ORAL ONCE
Status: COMPLETED | OUTPATIENT
Start: 2025-06-10 | End: 2025-06-10

## 2025-06-10 RX ORDER — NORETHINDRONE 5 MG/1
5 TABLET ORAL DAILY
Status: DISCONTINUED | OUTPATIENT
Start: 2025-06-11 | End: 2025-06-19 | Stop reason: HOSPADM

## 2025-06-10 RX ORDER — ONDANSETRON 2 MG/ML
4 INJECTION INTRAMUSCULAR; INTRAVENOUS EVERY 6 HOURS PRN
Status: DISCONTINUED | OUTPATIENT
Start: 2025-06-10 | End: 2025-06-10

## 2025-06-10 RX ORDER — HYDROCORTISONE 25 MG/G
CREAM TOPICAL 2 TIMES DAILY PRN
COMMUNITY

## 2025-06-10 RX ORDER — AMOXICILLIN 250 MG
2 CAPSULE ORAL 2 TIMES DAILY
Status: DISCONTINUED | OUTPATIENT
Start: 2025-06-10 | End: 2025-06-19 | Stop reason: HOSPADM

## 2025-06-10 RX ORDER — KETOROLAC TROMETHAMINE 15 MG/ML
15 INJECTION, SOLUTION INTRAMUSCULAR; INTRAVENOUS EVERY 6 HOURS PRN
Status: DISPENSED | OUTPATIENT
Start: 2025-06-10 | End: 2025-06-15

## 2025-06-10 RX ORDER — CYCLOBENZAPRINE HCL 5 MG
5 TABLET ORAL 3 TIMES DAILY PRN
COMMUNITY

## 2025-06-10 RX ORDER — MAGNESIUM HYDROXIDE/ALUMINUM HYDROXICE/SIMETHICONE 120; 1200; 1200 MG/30ML; MG/30ML; MG/30ML
30 SUSPENSION ORAL EVERY 4 HOURS PRN
Status: DISCONTINUED | OUTPATIENT
Start: 2025-06-10 | End: 2025-06-19 | Stop reason: HOSPADM

## 2025-06-10 RX ORDER — AMMONIUM LACTATE 12 G/100G
CREAM TOPICAL 2 TIMES DAILY
COMMUNITY

## 2025-06-10 RX ORDER — ONDANSETRON 4 MG/1
4 TABLET, ORALLY DISINTEGRATING ORAL EVERY 6 HOURS PRN
Status: DISCONTINUED | OUTPATIENT
Start: 2025-06-10 | End: 2025-06-10

## 2025-06-10 RX ORDER — PREGABALIN 100 MG/1
100 CAPSULE ORAL ONCE
Status: COMPLETED | OUTPATIENT
Start: 2025-06-10 | End: 2025-06-10

## 2025-06-10 RX ORDER — METOCLOPRAMIDE 5 MG/1
5 TABLET ORAL 4 TIMES DAILY PRN
COMMUNITY

## 2025-06-10 RX ORDER — ONDANSETRON 2 MG/ML
4 INJECTION INTRAMUSCULAR; INTRAVENOUS EVERY 6 HOURS PRN
Status: DISCONTINUED | OUTPATIENT
Start: 2025-06-10 | End: 2025-06-14 | Stop reason: DRUGHIGH

## 2025-06-10 RX ORDER — BISACODYL 10 MG
10 SUPPOSITORY, RECTAL RECTAL DAILY PRN
Status: DISCONTINUED | OUTPATIENT
Start: 2025-06-10 | End: 2025-06-19 | Stop reason: HOSPADM

## 2025-06-10 RX ORDER — ONDANSETRON 4 MG/1
4 TABLET, ORALLY DISINTEGRATING ORAL EVERY 8 HOURS PRN
COMMUNITY

## 2025-06-10 RX ORDER — PREGABALIN 100 MG/1
100 CAPSULE ORAL EVERY MORNING
Status: DISCONTINUED | OUTPATIENT
Start: 2025-06-11 | End: 2025-06-19 | Stop reason: HOSPADM

## 2025-06-10 RX ORDER — POLYETHYLENE GLYCOL 3350 17 G/17G
17 POWDER, FOR SOLUTION ORAL DAILY
Status: DISCONTINUED | OUTPATIENT
Start: 2025-06-10 | End: 2025-06-10

## 2025-06-10 RX ORDER — ACETAMINOPHEN 500 MG
500-1000 TABLET ORAL EVERY 6 HOURS PRN
COMMUNITY
End: 2025-06-23

## 2025-06-10 RX ORDER — BUSPIRONE HYDROCHLORIDE 15 MG/1
15 TABLET ORAL 2 TIMES DAILY
Status: DISCONTINUED | OUTPATIENT
Start: 2025-06-10 | End: 2025-06-19 | Stop reason: HOSPADM

## 2025-06-10 RX ORDER — AMOXICILLIN 250 MG
2 CAPSULE ORAL 2 TIMES DAILY PRN
Status: DISCONTINUED | OUTPATIENT
Start: 2025-06-10 | End: 2025-06-19 | Stop reason: HOSPADM

## 2025-06-10 RX ORDER — PREGABALIN 100 MG/1
200 CAPSULE ORAL EVERY EVENING
Status: DISCONTINUED | OUTPATIENT
Start: 2025-06-10 | End: 2025-06-19 | Stop reason: HOSPADM

## 2025-06-10 RX ORDER — CYCLOBENZAPRINE HCL 5 MG
5 TABLET ORAL 3 TIMES DAILY PRN
Status: DISCONTINUED | OUTPATIENT
Start: 2025-06-10 | End: 2025-06-19 | Stop reason: HOSPADM

## 2025-06-10 RX ORDER — SENNOSIDES 8.6 MG
1 TABLET ORAL 2 TIMES DAILY PRN
COMMUNITY

## 2025-06-10 RX ORDER — CETIRIZINE HYDROCHLORIDE 10 MG/1
10 TABLET ORAL 2 TIMES DAILY
Status: DISCONTINUED | OUTPATIENT
Start: 2025-06-10 | End: 2025-06-19 | Stop reason: HOSPADM

## 2025-06-10 RX ORDER — BUSPIRONE HYDROCHLORIDE 15 MG/1
15 TABLET ORAL 2 TIMES DAILY
COMMUNITY

## 2025-06-10 RX ORDER — HYDROXYZINE HYDROCHLORIDE 25 MG/1
25 TABLET, FILM COATED ORAL EVERY 8 HOURS PRN
Status: DISCONTINUED | OUTPATIENT
Start: 2025-06-10 | End: 2025-06-19 | Stop reason: HOSPADM

## 2025-06-10 RX ORDER — LIDOCAINE 5 G/100G
CREAM RECTAL; TOPICAL DAILY PRN
COMMUNITY

## 2025-06-10 RX ORDER — ALBUTEROL SULFATE 0.83 MG/ML
2.5 SOLUTION RESPIRATORY (INHALATION) EVERY 6 HOURS PRN
Status: DISCONTINUED | OUTPATIENT
Start: 2025-06-10 | End: 2025-06-19 | Stop reason: HOSPADM

## 2025-06-10 RX ORDER — PREGABALIN 100 MG/1
200 CAPSULE ORAL EVERY EVENING
COMMUNITY

## 2025-06-10 RX ORDER — HYDROXYCHLOROQUINE SULFATE 200 MG/1
200 TABLET, FILM COATED ORAL 2 TIMES DAILY
COMMUNITY

## 2025-06-10 RX ORDER — CALCIUM CARBONATE 500 MG/1
1000 TABLET, CHEWABLE ORAL 4 TIMES DAILY PRN
Status: DISCONTINUED | OUTPATIENT
Start: 2025-06-10 | End: 2025-06-19 | Stop reason: HOSPADM

## 2025-06-10 RX ORDER — PANTOPRAZOLE SODIUM 40 MG/1
40 TABLET, DELAYED RELEASE ORAL DAILY
COMMUNITY

## 2025-06-10 RX ORDER — POLYETHYLENE GLYCOL 3350 17 G/17G
17 POWDER, FOR SOLUTION ORAL 3 TIMES DAILY
Status: DISCONTINUED | OUTPATIENT
Start: 2025-06-10 | End: 2025-06-19 | Stop reason: HOSPADM

## 2025-06-10 RX ORDER — CLONAZEPAM 0.5 MG/1
0.5 TABLET ORAL DAILY PRN
Status: DISCONTINUED | OUTPATIENT
Start: 2025-06-10 | End: 2025-06-19 | Stop reason: HOSPADM

## 2025-06-10 RX ORDER — HYDROXYCHLOROQUINE SULFATE 200 MG/1
200 TABLET, FILM COATED ORAL 2 TIMES DAILY
Status: DISCONTINUED | OUTPATIENT
Start: 2025-06-10 | End: 2025-06-19 | Stop reason: HOSPADM

## 2025-06-10 RX ORDER — AMOXICILLIN 250 MG
1 CAPSULE ORAL 2 TIMES DAILY
Status: DISCONTINUED | OUTPATIENT
Start: 2025-06-10 | End: 2025-06-19 | Stop reason: HOSPADM

## 2025-06-10 RX ORDER — DOCUSATE SODIUM 100 MG/1
100 CAPSULE, LIQUID FILLED ORAL DAILY
COMMUNITY

## 2025-06-10 RX ORDER — ONDANSETRON 4 MG/1
4 TABLET, ORALLY DISINTEGRATING ORAL EVERY 6 HOURS PRN
Status: DISCONTINUED | OUTPATIENT
Start: 2025-06-10 | End: 2025-06-19 | Stop reason: HOSPADM

## 2025-06-10 RX ORDER — ACETAMINOPHEN 650 MG/1
650 SUPPOSITORY RECTAL EVERY 4 HOURS PRN
Status: DISCONTINUED | OUTPATIENT
Start: 2025-06-10 | End: 2025-06-19 | Stop reason: HOSPADM

## 2025-06-10 RX ORDER — AMOXICILLIN 250 MG
1 CAPSULE ORAL 2 TIMES DAILY PRN
Status: DISCONTINUED | OUTPATIENT
Start: 2025-06-10 | End: 2025-06-19 | Stop reason: HOSPADM

## 2025-06-10 RX ORDER — POLYETHYLENE GLYCOL 3350 17 G/17G
17 POWDER, FOR SOLUTION ORAL 2 TIMES DAILY PRN
Status: DISCONTINUED | OUTPATIENT
Start: 2025-06-10 | End: 2025-06-10

## 2025-06-10 RX ADMIN — HYDROXYCHLOROQUINE SULFATE 200 MG: 200 TABLET, FILM COATED ORAL at 20:05

## 2025-06-10 RX ADMIN — PREGABALIN 100 MG: 100 CAPSULE ORAL at 20:04

## 2025-06-10 RX ADMIN — KETOROLAC TROMETHAMINE 15 MG: 15 INJECTION, SOLUTION INTRAMUSCULAR; INTRAVENOUS at 14:44

## 2025-06-10 RX ADMIN — SUMATRIPTAN SUCCINATE 25 MG: 25 TABLET ORAL at 16:05

## 2025-06-10 RX ADMIN — CYCLOBENZAPRINE 5 MG: 5 TABLET, FILM COATED ORAL at 20:04

## 2025-06-10 RX ADMIN — BUSPIRONE HYDROCHLORIDE 15 MG: 15 TABLET ORAL at 21:22

## 2025-06-10 RX ADMIN — ONDANSETRON 4 MG: 2 INJECTION, SOLUTION INTRAMUSCULAR; INTRAVENOUS at 11:55

## 2025-06-10 RX ADMIN — APIXABAN 5 MG: 5 TABLET, FILM COATED ORAL at 20:05

## 2025-06-10 RX ADMIN — SENNOSIDES AND DOCUSATE SODIUM 2 TABLET: 50; 8.6 TABLET ORAL at 20:05

## 2025-06-10 RX ADMIN — PREGABALIN 200 MG: 100 CAPSULE ORAL at 21:22

## 2025-06-10 RX ADMIN — POLYETHYLENE GLYCOL 3350 17 G: 17 POWDER, FOR SOLUTION ORAL at 17:10

## 2025-06-10 RX ADMIN — HYDROXYZINE HYDROCHLORIDE 25 MG: 25 TABLET, FILM COATED ORAL at 20:04

## 2025-06-10 RX ADMIN — SENNOSIDES AND DOCUSATE SODIUM 1 TABLET: 50; 8.6 TABLET ORAL at 14:41

## 2025-06-10 RX ADMIN — POLYETHYLENE GLYCOL 3350 17 G: 17 POWDER, FOR SOLUTION ORAL at 21:22

## 2025-06-10 RX ADMIN — AMITRIPTYLINE HYDROCHLORIDE 50 MG: 50 TABLET, FILM COATED ORAL at 21:21

## 2025-06-10 RX ADMIN — CETIRIZINE HYDROCHLORIDE 10 MG: 10 TABLET, FILM COATED ORAL at 20:04

## 2025-06-10 RX ADMIN — ACETAMINOPHEN 650 MG: 325 TABLET, FILM COATED ORAL at 20:04

## 2025-06-10 ASSESSMENT — ACTIVITIES OF DAILY LIVING (ADL)
ADLS_ACUITY_SCORE: 67
ADLS_ACUITY_SCORE: 62
ADLS_ACUITY_SCORE: 62
ADLS_ACUITY_SCORE: 67
ADLS_ACUITY_SCORE: 65
ADLS_ACUITY_SCORE: 67
ADLS_ACUITY_SCORE: 67
ADLS_ACUITY_SCORE: 65
ADLS_ACUITY_SCORE: 67

## 2025-06-10 NOTE — ED TRIAGE NOTES
"Pt BIBA from home with c/o back pain \"shooting\" down both legs. Pain 10/10 in triage. Pt seen in [facility] ED last night for similar neuropathic pain.        "

## 2025-06-10 NOTE — PLAN OF CARE
Goal Outcome Evaluation:       Summary: 1515-1930  Admitting Diagnosis: Chronic inflammatory demyelinating polyneuropathy (H) [G61.81]  Leg weakness, bilateral [R29.898]     Orientation: A&O x4  Activity Level: AO1 GBW  Behavior & Aggression:Green  Pain : C/O 9-10/10 Pain mainly headache, this has been going on for past few weeks and per pt is worsening. Pt states that it starts near her eyes and radiates to the back of her head/neck. Describes the pain as constant throbbing. Does endorse that she occasionally gets blurry vision with headaches but this is not present currently. Pt able to track well, pupils are PERRLA, and pt denies any lightheadedness dizziness(room spinning). Neurology consulted and is following.  Pt reports that no treatments have been able to resolve headaches, was given Toradol in the ED which pt stated didn't help. Imitrex given with no results as well per pt although pt appeared comfortable most of shift. Headache markedly worsened at shift change.  VS: WNL ex., BP is elevated  Lab: elevated inflammatory markers  Bowel/Bladder: constipated, enemas daily enemas ordered  Tele: Sinus rhythm  Skin: intact, some scattered bruising, in on blood thinner  CMS: baseline numbness/weakness in BLE L>R, and endorses numbness/tingling in BUE which she says are new over the past few weeks. Has foot drop in LLE which is baseline.  Neuros: intact other then what is noted above  Tests/Procedures: MRI's under anestesia possibly tomorrow. NPO @ midnight?  Plan: continue work up    Vitals:  Temp: 97  F (36.1  C)   BP: 137/89 Pulse: 102   Resp: 16 SpO2: 98 %         Diet: Combination Diet Regular Diet Adult          Other Important Info: Neurology ordered MRIs to further assess pts symptoms. Per pt and per orders pt needs to be placed under anesthesia in order to complete MRIs due to claustrophobia and the amount of MRIs discussed with MRI and pt will need anesthesia consult so this can be arranged. Discussed with  incoming shift and they will follow up on this. Per pt desire is to get these done tomorrow.

## 2025-06-10 NOTE — CONSULTS
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   Transportation Needs: Unmet Transportation Needs (3/2/2025)    [facility]     Transportation Needs     Does lack of transportation keep you from medical appointments?: 1     Does lack of transportation keep you from work, meetings or getting things that you need?: 2   Physical Activity: Insufficiently Active (11/13/2024)    Received from [facility]    Exercise Vital Sign     Days of Exercise per Week: 3 days     Minutes of Exercise per Session: 10 min   Stress: Not on file   Social Connections: Socially Integrated (3/2/2025)    Received from [facility]    Social Connections     Do you often feel lonely or isolated from those around you?: 0   Interpersonal Safety: Not At Risk (11/12/2024)    [facility]    Humiliation, Afraid, Rape, and Kick questionnaire     Fear of Current or Ex-Partner: No     Emotionally Abused: No     Physically Abused: No     Sexually Abused: No   Housing Stability: Low Risk  (3/2/2025)    Received from [facility]    Housing Stability     What is your housing situation today?: 1       Current Facility-Administered Medications   Medication Dose Route Frequency Provider Last Rate Last Admin    acetaminophen (TYLENOL) tablet 650 mg  650 mg Oral Q4H PRN Toya Powell PA-C        Or    acetaminophen (TYLENOL) Suppository 650 mg  650 mg Rectal Q4H PRN Toya Powell PA-C        albuterol (PROVENTIL) neb solution 2.5 mg  2.5 mg Nebulization Q6H PRN Toya Powell PA-C        alum & mag hydroxide-simethicone (MAALOX) suspension 30 mL  30 mL Oral Q4H PRN Toya Powell PA-C        amitriptyline (ELAVIL) tablet 50 mg  50 mg Oral At Bedtime Toya Powell PA-C        apixaban ANTICOAGULANT (ELIQUIS) tablet 5 mg  5 mg Oral BID Toya Powell PA-C        bisacodyl (DULCOLAX) suppository 10 mg  10 mg Rectal Daily PRN Toya Powell PA-C        busPIRone (BUSPAR) tablet 15 mg  15 mg Oral BID Toya Powell PA-C         calcium carbonate (TUMS) chewable tablet 1,000 mg  1,000 mg Oral 4x Daily PRN Toya Powell PA-C        cetirizine (zyrTEC) tablet 10 mg  10 mg Oral BID Toya Powell PA-C        clonazePAM (klonoPIN) tablet 0.5 mg  0.5 mg Oral Daily PRN Toya Powell PA-C        cyclobenzaprine (FLEXERIL) tablet 5 mg  5 mg Oral TID PRN Toya Powell PA-C        hydroxychloroquine (PLAQUENIL) tablet 200 mg  200 mg Oral BID Toya Powell PA-C        hydrOXYzine HCl (ATARAX) tablet 25 mg  25 mg Oral Q8H PRN Toya Powell PA-C        ketorolac (TORADOL) injection 15 mg  15 mg Intravenous Q6H PRN Toya Powell PA-C   15 mg at 06/10/25 1444    lidocaine 1 % 0.1-1 mL  0.1-1 mL Other Q1H PRN Toya Pwoell PA-C        [START ON 6/11/2025] norethindrone (AYGESTIN) tablet 5 mg  5 mg Oral Daily Toya Powell PA-C        ondansetron (ZOFRAN ODT) ODT tab 4 mg  4 mg Oral Q6H PRN Toya Powell PA-C        Or    ondansetron (ZOFRAN) injection 4 mg  4 mg Intravenous Q6H PRN Toya Powell PA-C        [START ON 6/11/2025] pantoprazole (PROTONIX) EC tablet 40 mg  40 mg Oral Daily Toya Powell PA-C        Patient is already receiving anticoagulation with heparin, enoxaparin (LOVENOX), warfarin (COUMADIN)  or other anticoagulant medication   Does not apply Continuous PRN Toya Powell PA-C        polyethylene glycol (MIRALAX) Packet 17 g  17 g Oral Daily Toya Powell PA-C        polyethylene glycol (MIRALAX) Packet 17 g  17 g Oral TID Keyonna Caban PA-C        [START ON 6/11/2025] pregabalin (LYRICA) capsule 100 mg  100 mg Oral QAM Toya Powell PA-C        [START ON 6/11/2025] pregabalin (LYRICA) capsule 100 mg  100 mg Oral Daily with lunch Toya Powell PA-C        pregabalin (LYRICA) capsule 200 mg  200 mg Oral QPM Toya Powell PA-C         senna-docusate (SENOKOT-S/PERICOLACE) 8.6-50 MG per tablet 1 tablet  1 tablet Oral BID PRN Tylerusam, Toya Rothman, PA-C        Or    senna-docusate (SENOKOT-S/PERICOLACE) 8.6-50 MG per tablet 2 tablet  2 tablet Oral BID PRN Tylerusam, Toya Rothman, PA-C        senna-docusate (SENOKOT-S/PERICOLACE) 8.6-50 MG per tablet 1 tablet  1 tablet Oral BID Tylerusam, Toya Rothman, PA-C   1 tablet at 06/10/25 1441    Or    senna-docusate (SENOKOT-S/PERICOLACE) 8.6-50 MG per tablet 2 tablet  2 tablet Oral BID Tylerusadeena, Toya Rothman, PA-C        sodium chloride (PF) 0.9% PF flush 3 mL  3 mL Intracatheter Q8H TOSHIA Toya Powell PA-C        sodium chloride (PF) 0.9% PF flush 3 mL  3 mL Intracatheter q1 min prn Sherry, Toya Rothman PA-C           Allergies   Allergen Reactions    Influenza Vaccines Other (See Comments) and Nausea and Vomiting     HUT Reaction: Nausea And Vomiting; HUT Reaction Type: Intolerance; HUT Severity: Low; [facility] Noted: [id number]    Latex Rash           Oseltamivir Hives    Penicillins Anaphylaxis    Vancomycin Itching, Swelling and Rash     Flushed face, very itchy    Hydrocodone Nausea and Vomiting and GI Disturbance     vomiting for days    Blood-Group Specific Substance Other (See Comments)     Patient has an anti-Cw and non-specific antibodies. Blood product orders may be delayed. Draw one red top and two purple top tubes for all type/screen/crossmatch orders.  Patient has anti-Cw, anti-K ([place]), Warm auto and nonspecific antibodies. Blood products may be delayed. Draw patient 24 hours prior to transfusion. Draw one red top and two purple top tubes for all type and screen orders.    Clavulanic Acid Angioedema    Haemophilus B Polysaccharide Vaccine Nausea and Vomiting    Iodinated Contrast Media Itching and Other (See Comments)     Vaginal irritation, burning    Iodine Hives and Other (See Comments)    Oxycodone Swelling    Sulfamethoxazole Angioedema and Other (See Comments)     Trimethoprim Angioedema, Swelling and Other (See Comments)    Adhesive Tape Rash     Silicone type    Adhesive allergy    Silicone type      Other reaction(s): adhesive allergy      Silicone type Adhesive allergy      Silicone type  Other reaction(s): adhesive allergy Other reaction(s): adhesive allergy  Silicone type  Other reaction(s): adhesive allergy  Silicone type  Other reaction(s): adhesive allergy Other reaction(s): adhesive allergy      Silicone type  Other reaction(s): adhesive allergy  Silicone type Adhesive allergy  Silicone type  Other reaction(s): adhesive allergy Other reaction(s): adhesive allergy  Silicone type  Other reaction(s): adhesive allergy  Silicone type  Other reaction(s): adhesive allergy Other reaction(s): adhesive allergy  Silicone type  Other reaction(s): adhesive allergy  Silicone type  Other reaction(s): adhesive allergy Other reaction(s): adhesive allergy    Silicone type  Other reaction(s): adhesive allergy  Silicone type Adhesive allergy      Silicone type  Other reaction(s): adhesive allergy Other reaction(s): adhesive allergy  Silicone type  Other reaction(s): adhesive allergy  Silicone type  Other reaction(s): adhesive allergy Other reaction(s): adhesive allergy      Silicone type  Other reaction(s): adhesive allergy  Silicone type  Other reaction(s): adhesive allergy Other reaction(s): adhesive allergy    Band-Aid Anti-Itch      Other reaction(s): adhesive allergy    Cephalosporins Rash    Lamotrigine Rash     Possibly associated with Lamictal.     Naltrexone Other (See Comments)     Reaction(s): Vivid dreams.         Intake/Output past 24 hrs:  No intake or output data in the 24 hours ending 06/10/25 1641    Physical Exam:  Temp:  [97  F (36.1  C)-98.7  F (37.1  C)] 98.7  F (37.1  C)  Pulse:  [] 99  Resp:  [16-18] 16  BP: (135-161)/() 161/112  SpO2:  [97 %-100 %] 98 %    General: NAD, alert, cooperative  Head: normocephalic, without abnormality /  atraumatic  Respiratory: non-labored breathing  Abdomen: soft, non-tender, non-distended.  No guarding or rebound  Perianal/Rectal:   This is a chaperoned exam with a female nurse present. There is a  tender firm thrombosed external hemorrhoid in the posterior midline.  There is soft circumferential external hemorrhoid skin tags.  Digital rectal exam reveals increased sphincter tone along with the degree of non relaxation.  There was no stool palpated in the rectum  Skin: no rashes or lesions  Musculoskeletal: moves all four extremities equally; no calf edema or tenderness  Psychological: alert and oriented, answers questions appropriately  Neurological: cranial nerves grossly intact    Assessment/Plan: Nevin Alvarado is a 33yo F with history of chronic inflammatory demyelinating polyneuropathy, constipation, GERD, fibromyalgia, PE (on eliquis) who was admitted on 6/10/2025. She initially presented 6/9/25 for worsening leg pain, weakness, and falls and went home. She presents again 6/10 for evaluation of lower extremity weakness, neuropathic pain as well as midline cervical, thoracic, and lumbar back pain.  She reports not being able to have a bowel movements for past week.  She had a abdominal x-ray that showed moderate stool burden.  There was no obstruction noted      1.  No acute surgical intervention  2.  Recommend tap water enemas tonight and tomorrow morning  3.  Would increase MiraLax to 3 times a day  4.  Conservative management of her thrombosed hemorrhoid with lidocaine topical  5.  CRS will continue to follow up    Medical Decision Making:   Additional workup / testing ordered:  none at this time  Procedure / Surgery recommended:  none at this time   Old records reviewed -  old notes, lab work, imaging  Medications added / changed:  tap water enema    This case was discussed with:  Dr. Max    Thank you for consulting [facility] regarding this patient's care. Please contact us  with questions or concerns.      Level of MDM:  Moderate    Keyonna Caban PA-C  [facility]  [phone]

## 2025-06-10 NOTE — ED NOTES
[facility]  ED Nurse Handoff Report    ED Chief complaint: Leg Pain      ED Diagnosis:   Final diagnoses:   Abnormal gait   CIDP (chronic inflammatory demyelinating polyneuropathy) (H)       Code Status: Full Code    Allergies:   Allergies   Allergen Reactions    Influenza Vaccines Other (See Comments) and Nausea and Vomiting     HUT Reaction: Nausea And Vomiting; HUT Reaction Type: Intolerance; HUT Severity: Low; [facility] Noted: [id number]    Latex Rash           Oseltamivir Hives    Penicillins Anaphylaxis    Vancomycin Itching, Swelling and Rash     Flushed face, very itchy    Hydrocodone Nausea and Vomiting and GI Disturbance     vomiting for days    Blood-Group Specific Substance Other (See Comments)     Patient has an anti-Cw and non-specific antibodies. Blood product orders may be delayed. Draw one red top and two purple top tubes for all type/screen/crossmatch orders.  Patient has anti-Cw, anti-K ([place]), Warm auto and nonspecific antibodies. Blood products may be delayed. Draw patient 24 hours prior to transfusion. Draw one red top and two purple top tubes for all type and screen orders.    Clavulanic Acid Angioedema    Haemophilus B Polysaccharide Vaccine Nausea and Vomiting    Iodinated Contrast Media Itching and Other (See Comments)     Vaginal irritation, burning    Iodine Hives and Other (See Comments)    Oxycodone Swelling    Sulfamethoxazole Angioedema and Other (See Comments)    Trimethoprim Angioedema, Swelling and Other (See Comments)    Adhesive Tape Rash     Silicone type    Adhesive allergy    Silicone type      Other reaction(s): adhesive allergy      Silicone type Adhesive allergy      Silicone type  Other reaction(s): adhesive allergy Other reaction(s): adhesive allergy  Silicone type  Other reaction(s): adhesive allergy  Silicone type  Other reaction(s): adhesive allergy Other reaction(s): adhesive allergy      Silicone type  Other reaction(s): adhesive allergy  Silicone type  Adhesive allergy  Silicone type  Other reaction(s): adhesive allergy Other reaction(s): adhesive allergy  Silicone type  Other reaction(s): adhesive allergy  Silicone type  Other reaction(s): adhesive allergy Other reaction(s): adhesive allergy  Silicone type  Other reaction(s): adhesive allergy  Silicone type  Other reaction(s): adhesive allergy Other reaction(s): adhesive allergy    Silicone type  Other reaction(s): adhesive allergy  Silicone type Adhesive allergy      Silicone type  Other reaction(s): adhesive allergy Other reaction(s): adhesive allergy  Silicone type  Other reaction(s): adhesive allergy  Silicone type  Other reaction(s): adhesive allergy Other reaction(s): adhesive allergy      Silicone type  Other reaction(s): adhesive allergy  Silicone type  Other reaction(s): adhesive allergy Other reaction(s): adhesive allergy    Band-Aid Anti-Itch      Other reaction(s): adhesive allergy    Cephalosporins Rash    Lamotrigine Rash     Possibly associated with Lamictal.     Naltrexone Other (See Comments)     Reaction(s): Vivid dreams.       Patient Story: Pt BIBA from apartment, with c/o increasing neuropathy BLE. Neurology appointment 6/30. Dysuria and reports constipation. No interventions completed PTA.   Focused Assessment:  cardiac-wdl  Neuro-wdl  Resp-wdl    Treatments and/or interventions provided: none  Patient's response to treatments and/or interventions: NA    To be done/followed up on inpatient unit:  monitor    Does this patient have any cognitive concerns?: A/OX4    Activity level - Baseline/Home:  Independent  Activity Level - Current:   Stand with assist x2    Patient's Preferred language: English   Needed?: No    Isolation: None  Infection: Not Applicable  Sepsis treatment initiated: No  Patient tested for COVID 19 prior to admission: NO  Bariatric?: No    Vital Signs:   Vitals:    06/09/25 1749 06/09/25 1949   BP: 135/82 (!) 150/83   Pulse: 97 99   Resp: 18 18   Temp: 97.6  F  (36.4  C)    TempSrc: Oral    SpO2: 97% 100%   Weight: 102.5 kg (226 lb)        Cardiac Rhythm:     Was the PSS-3 completed:   Yes  What interventions are required if any?               Family Comments: none  OBS brochure/video discussed/provided to patient/family: N/A              Name of person given brochure if not patient: NA              Relationship to patient: NA    For the majority of the shift this patient's behavior was Green.   Behavioral interventions performed were NA.    ED NURSE PHONE NUMBER: [phone]

## 2025-06-10 NOTE — CONSULTS
[facility]  Consult Note - Hospitalist Service  Date of Admission:  6/9/2025  Consult Requested by: Dr Robertson  Reason for Consult: weakness    Assessment & Plan   Nevin Alvarado is a 34 year old female presented to the er on 6/9. I was asked to see her for admission. Nevin is a pleasant young lady who reports two falls over the last two days with worsening symptoms of neuropathy which could be potentially related to her history of chronic inflammatory demyelinating polyneuropathy. Her case was discussed with neurology in the er and they recommended admission overnight so she could be evaluated by neurology in person in the morning. Nevin has an appointment for evaluation of technical needs on on the morning of 6/10. She requested to be discharged from the hospital. She is aware of neurology's recs and will come back to the hospital if her symptoms return. She reports that she is confident that she can ambulate and stay safe at home for now. I discussed her request with Dr Robertson who will manage her discharge from the er.             Renetta Voss MD, MD  Hospitalist Service  Securely message with [employer] (more info)  Text page via [facility] Paging/Directory   __________________________________________________________________

## 2025-06-10 NOTE — ED NOTES
[facility]  ED Nurse Handoff Report    ED Chief complaint: Leg Pain      ED Diagnosis:   Final diagnoses:   Leg weakness, bilateral - ACUTE ON CHRONIC   Chronic inflammatory demyelinating polyneuropathy (H) - HISTORY OF       Code Status: Full Code    Allergies:   Allergies   Allergen Reactions    Influenza Vaccines Other (See Comments) and Nausea and Vomiting     HUT Reaction: Nausea And Vomiting; HUT Reaction Type: Intolerance; HUT Severity: Low; [facility] Noted: [id number]    Latex Rash           Oseltamivir Hives    Penicillins Anaphylaxis    Vancomycin Itching, Swelling and Rash     Flushed face, very itchy    Hydrocodone Nausea and Vomiting and GI Disturbance     vomiting for days    Blood-Group Specific Substance Other (See Comments)     Patient has an anti-Cw and non-specific antibodies. Blood product orders may be delayed. Draw one red top and two purple top tubes for all type/screen/crossmatch orders.  Patient has anti-Cw, anti-K ([place]), Warm auto and nonspecific antibodies. Blood products may be delayed. Draw patient 24 hours prior to transfusion. Draw one red top and two purple top tubes for all type and screen orders.    Clavulanic Acid Angioedema    Haemophilus B Polysaccharide Vaccine Nausea and Vomiting    Iodinated Contrast Media Itching and Other (See Comments)     Vaginal irritation, burning    Iodine Hives and Other (See Comments)    Oxycodone Swelling    Sulfamethoxazole Angioedema and Other (See Comments)    Trimethoprim Angioedema, Swelling and Other (See Comments)    Adhesive Tape Rash     Silicone type    Adhesive allergy    Silicone type      Other reaction(s): adhesive allergy      Silicone type Adhesive allergy      Silicone type  Other reaction(s): adhesive allergy Other reaction(s): adhesive allergy  Silicone type  Other reaction(s): adhesive allergy  Silicone type  Other reaction(s): adhesive allergy Other reaction(s): adhesive allergy      Silicone type  Other reaction(s):  "adhesive allergy  Silicone type Adhesive allergy  Silicone type  Other reaction(s): adhesive allergy Other reaction(s): adhesive allergy  Silicone type  Other reaction(s): adhesive allergy  Silicone type  Other reaction(s): adhesive allergy Other reaction(s): adhesive allergy  Silicone type  Other reaction(s): adhesive allergy  Silicone type  Other reaction(s): adhesive allergy Other reaction(s): adhesive allergy    Silicone type  Other reaction(s): adhesive allergy  Silicone type Adhesive allergy      Silicone type  Other reaction(s): adhesive allergy Other reaction(s): adhesive allergy  Silicone type  Other reaction(s): adhesive allergy  Silicone type  Other reaction(s): adhesive allergy Other reaction(s): adhesive allergy      Silicone type  Other reaction(s): adhesive allergy  Silicone type  Other reaction(s): adhesive allergy Other reaction(s): adhesive allergy    Band-Aid Anti-Itch      Other reaction(s): adhesive allergy    Cephalosporins Rash    Lamotrigine Rash     Possibly associated with Lamictal.     Naltrexone Other (See Comments)     Reaction(s): Vivid dreams.       Patient Story: Pt BIBA from home with c/o back pain \"shooting\" down both legs. Pain 10/10 in triage. Pt seen in [facility] ED last night for similar neuropathic pain.   Focused Assessment:  Pt was here yesterday for same issue. Had workup and was offered admission to which pt declined. Stating that she has too many things to do. Patient discharged 10 hours ago and did not have a ride home. Will require assistance with transport at discharge.     Treatments and/or interventions provided: None while in ED for this visit.   Patient's response to treatments and/or interventions: N/A    To be done/followed up on inpatient unit:  See orders.     Does this patient have any cognitive concerns?: Anxiety and helplessness at times.    Activity level - Baseline/Home:  Independent  Activity Level - Current:   Stand with Assist and Wheelchair, claims she needs " help with standing and wants to be lifted to bed/toilet but when staff unable to lift pt, she ambulated independently from wheelchair to toilet and back.     Patient's Preferred language: English   Needed?: No    Isolation: None  Infection: Not Applicable    ED NURSE PHONE NUMBER: [phone]

## 2025-06-10 NOTE — H&P
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 DUODENOSCOPY (EGD), COMBINED N/A 3/11/2023    Procedure: ESOPHAGOGASTRODUODENOSCOPY WITH FOREIGN BODY REMOVAL;  Surgeon: Cruz Kumar MD;  Location: [facility]    ESOPHAGOSCOPY, GASTROSCOPY, DUODENOSCOPY (EGD), COMBINED N/A 10/16/2023    Procedure: ESOPHAGOGASTRODUODENOSCOPY (EGD) WITH FOREIGN BODY REMOVAL;  Surgeon: Cruz Kumar MD;  Location: [facility]    ESOPHAGOSCOPY, GASTROSCOPY, DUODENOSCOPY (EGD), COMBINED N/A 10/29/2023    Procedure: ESOPHAGOGASTRODUODENOSCOPY, WITH FOREIGN BODY REMOVAL;  Surgeon: Kash Griffin MD;  Location:  GI    ESOPHAGOSCOPY, GASTROSCOPY, DUODENOSCOPY (EGD), COMBINED N/A 3/29/2024    Procedure: ESOPHAGOGASTRODUODENOSCOPY WITH BIOSPIES;  Surgeon: Gustavo Mathew  MD Esteban;  Location: [facility]    ESOPHAGOSCOPY, GASTROSCOPY, DUODENOSCOPY (EGD), DILATATION, COMBINED N/A 8/30/2021    Procedure: ESOPHAGOGASTRODUODENOSCOPY, WITH DILATION (mngi);  Surgeon: Pat Cervantes MD;  Location: [facility]    EXAM UNDER ANESTHESIA ANUS N/A 1/10/2017    Procedure: EXAM UNDER ANESTHESIA ANUS;  Surgeon: Annmarie Haynes MD;  [facility]    EXAM UNDER ANESTHESIA RECTUM N/A 7/19/2018    Procedure: EXAM UNDER ANESTHESIA RECTUM;  EXAM UNDER ANESTHESIA, REMOVAL OF RECTAL FOREIGN BODY;  Surgeon: Annmarie Haynes MD;  [facility]    HC REMOVE FECAL IMPACTION OR FB W ANESTHESIA N/A 12/18/2016    Procedure: REMOVE FECAL IMPACTION/FOREIGN BODY UNDER ANESTHESIA;  Surgeon: Soham Cano MD;  Location: [facility]    IR CVC TUNNEL PLACEMENT > 5 YRS OF AGE  4/2/2024    IR CVC TUNNEL REMOVAL RIGHT  4/16/2024    IR LUMBAR PUNCTURE  8/14/2024    KNEE SURGERY Right     KNEE SURGERY - removed a small tissue mass from knee Right     LAPAROSCOPIC ABLATION ENDOMETRIOSIS      LAPAROTOMY EXPLORATORY N/A 1/10/2017    Procedure: LAPAROTOMY EXPLORATORY;  Surgeon: Annmarie Haynes MD;  [facility]    LAPAROTOMY EXPLORATORY  09/11/2019    Manual manipulation of bowel to remove pill bottle in rectum    lymph nodes removed from neck; benign  age 7    MAMMOPLASTY REDUCTION Bilateral     OTHER SURGICAL HISTORY      foreign body anus removal    PICC DOUBLE LUMEN PLACEMENT  4/25/2024    [facility] ESOPHAGOGASTRODUODENOSCOPY TRANSORAL DIAGNOSTIC N/A 1/5/2019    Procedure: ESOPHAGOGASTRODUODENOSCOPY (EGD) with foreign body removal using raptor;  Surgeon: Lila Sol MD;  Location: [facility];  Service: Gastroenterology    [place] ESOPHAGOGASTRODUODENOSCOPY TRANSORAL DIAGNOSTIC N/A 1/25/2019    Procedure: ESOPHAGOGASTRODUODENOSCOPY (EGD) removal of foreign body;  Surgeon: Binu Vigil MD;  Location: [facility] OR;  Service: Gastroenterology    [facility]  ESOPHAGOGASTRODUODENOSCOPY TRANSORAL DIAGNOSTIC N/A 1/31/2019    Procedure: ESOPHAGOGASTRODUODENOSCOPY (EGD);  Surgeon: Siddharth Spears MD;  Location: [facility];  Service: Gastroenterology    [place] ESOPHAGOGASTRODUODENOSCOPY TRANSORAL DIAGNOSTIC N/A 8/17/2019    Procedure: ESOPHAGOGASTRODUODENOSCOPY (EGD) with foreign body removal;  Surgeon: Darek Lucero MD;  Location: [facility];  Service: Gastroenterology    [place] ESOPHAGOGASTRODUODENOSCOPY TRANSORAL DIAGNOSTIC N/A 9/29/2019    Procedure: ESOPHAGOGASTRODUODENOSCOPY (EGD) with foreign body removal;  Surgeon: Bailey Arnold MD;  Location: [facility];  Service: Gastroenterology    [place] ESOPHAGOGASTRODUODENOSCOPY TRANSORAL DIAGNOSTIC N/A 10/3/2019    Procedure: ESOPHAGOGASTRODUODENOSCOPY (EGD), REMOVAL OF FOREIGN BODY;  Surgeon: Chris Lira MD;  Location: [facility];  Service: Gastroenterology    [place] ESOPHAGOGASTRODUODENOSCOPY TRANSORAL DIAGNOSTIC N/A 10/6/2019    Procedure: ESOPHAGOGASTRODUODENOSCOPY (EGD) with attempted foreign body removal;  Surgeon: Felipe Connolly MD;  Location: [facility];  Service: Gastroenterology    [place] ESOPHAGOGASTRODUODENOSCOPY TRANSORAL DIAGNOSTIC N/A 12/15/2019    Procedure: ESOPHAGOGASTRODUODENOSCOPY (EGD) with foreign body removal;  Surgeon: Jeffy Zuñiga MD;  Location: [facility];  Service: Gastroenterology    [place] ESOPHAGOGASTRODUODENOSCOPY TRANSORAL DIAGNOSTIC N/A 12/17/2019    Procedure: ESOPHAGOGASTRODUODENOSCOPY (EGD) with attempted foreign body removal;  Surgeon: Felipe Connolly MD;  Location: [facility];  Service: Gastroenterology    [place] ESOPHAGOGASTRODUODENOSCOPY TRANSORAL DIAGNOSTIC N/A 12/21/2019    Procedure: ESOPHAGOGASTRODUODENOSCOPY (EGD) FOR FROEIGN BODY REMOVAL;  Surgeon: Binu Vigil MD;  Location: [facility];  Service: Gastroenterology    [place] ESOPHAGOGASTRODUODENOSCOPY TRANSORAL DIAGNOSTIC N/A 7/22/2020    Procedure:  ESOPHAGOGASTRODUODENOSCOPY (EGD);  Surgeon: Bailey Arnold MD;  Location: [facility];  Service: Gastroenterology    [place] ESOPHAGOGASTRODUODENOSCOPY TRANSORAL DIAGNOSTIC N/A 8/14/2020    Procedure: ESOPHAGOGASTRODUODENOSCOPY (EGD) FOREIGN BODY REMOVAL;  Surgeon: Jeffy Zuñiga MD;  Location: [facility] OR;  Service: Gastroenterology    [place] ESOPHAGOGASTRODUODENOSCOPY TRANSORAL DIAGNOSTIC N/A 2/25/2021    Procedure: ESOPHAGOGASTRODUODENOSCOPY (EGD) with foreign body reoval;  Surgeon: Bird Sethi MD;  Location: [facility];  Service: Gastroenterology    [place] ESOPHAGOGASTRODUODENOSCOPY TRANSORAL DIAGNOSTIC N/A 4/19/2021    Procedure: ESOPHAGOGASTRODUODENOSCOPY (EGD);  Surgeon: Libia Rose MD;  Location: [facility];  Service: Gastroenterology    [place] SURG DIAGNOSTIC EXAM, ANORECTAL N/A 2/5/2020    Procedure: EXAM UNDER ANESTHESIA, Flexible Sigmoidoscopy, Retrieval of Foreign Body;  Surgeon: Sasha Iavn MD;  Location: [facility];  Service: General    RELEASE CARPAL TUNNEL Bilateral     RELEASE CARPAL TUNNEL Bilateral     REMOVAL, FOREIGN BODY, RECTUM N/A 7/21/2021    Procedure: MANUAL RETREIVALOF FOREIGN OBJECT- RECTUM.;  Surgeon: Aleksandra Gerber MD;  Location: [facility]    SIGMOIDOSCOPY FLEXIBLE N/A 1/10/2017    Procedure: SIGMOIDOSCOPY FLEXIBLE;  Surgeon: Annmarie Haynes MD;  [facility]    SIGMOIDOSCOPY FLEXIBLE N/A 5/8/2018    Procedure: SIGMOIDOSCOPY FLEXIBLE;  flex sig with foreign body removal using snare and rattooth forcep;  Surgeon: Soham Cano MD;  Location:  GI    SIGMOIDOSCOPY FLEXIBLE N/A 2/20/2019    Procedure: Exam under anesthesia Colonoscopy with attempted  removal of rectal foreign body;  Surgeon: Estrada Chávez MD;  Location: [facility]       Prior to Admission Medications   Prior to Admission Medications   Prescriptions Last Dose Informant Patient Reported? Taking?   MOUNJARO 5 MG/0.5ML SOAJ Not Taking  No No   Sig: Inject 0.5  mLs (5 mg) subcutaneously every 7 days.   Patient not taking: Reported on 6/10/2025   Prenatal Vit-Fe Fumarate-FA (PRENATAL MULTIVITAMIN W/IRON) 27-0.8 MG tablet 6/10/2025  No Yes   Sig: Take 1 tablet by mouth daily.   Sodium Phosphates (FLEET ENEMA) ENEM   Yes Yes   Sig: Place 1 enema rectally as needed (constipation).   Tirzepatide (MOUNJARO) 7.5 MG/0.5ML SOAJ auto-injector pen Not Taking  No No   Sig: Inject 0.5 mLs (7.5 mg) subcutaneously once a week.   Patient not taking: Reported on 6/10/2025   acetaminophen (TYLENOL) 500 MG tablet   Yes Yes   Sig: Take 500-1,000 mg by mouth every 6 hours as needed for mild pain.   albuterol (PROVENTIL) (2.5 MG/3ML) 0.083% neb solution   No Yes   Sig: Take 1 vial (2.5 mg) by nebulization every 6 hours as needed for shortness of breath or wheezing   amitriptyline (ELAVIL) 50 MG tablet 6/9/2025  Yes Yes   Sig: Take 50 mg by mouth at bedtime.   ammonium lactate (AMLACTIN) 12 % external cream Past Week  Yes Yes   Sig: Apply topically 2 times daily. Apply to feet   apixaban ANTICOAGULANT (ELIQUIS) 5 MG tablet 6/10/2025 Morning  Yes Yes   Sig: Take 5 mg by mouth 2 times daily.   busPIRone (BUSPAR) 15 MG tablet 6/10/2025 Morning  Yes Yes   Sig: Take 15 mg by mouth 2 times daily.   cetirizine (ZYRTEC) 10 MG tablet 6/10/2025 Morning  Yes Yes   Sig: Take 10 mg by mouth 2 times daily.   cholecalciferol 50 MCG (2000 UT) CAPS 6/10/2025  Yes Yes   Sig: Take 50 mcg by mouth daily.   clonazePAM (KLONOPIN) 0.5 MG tablet   No Yes   Sig: Take 1 tablet (0.5 mg) by mouth daily as needed for anxiety   cyanocobalamin (VITAMIN B-12) 1000 MCG tablet 6/10/2025  Yes Yes   Sig: Take 1,000 mcg by mouth daily.   cyclobenzaprine (FLEXERIL) 5 MG tablet   Yes Yes   Sig: Take 5 mg by mouth 3 times daily as needed for muscle spasms.   docusate sodium (COLACE) 100 MG capsule 6/10/2025 Morning  Yes Yes   Sig: Take 100 mg by mouth daily.   ferrous sulfate (FEROSUL) 325 (65 Fe) MG tablet 6/10/2025 Self No Yes    Sig: Take 1 tablet (325 mg) by mouth daily (with breakfast)   hydrOXYzine HCl (ATARAX) 25 MG tablet   Yes Yes   Sig: Take 25 mg by mouth every 8 hours as needed for anxiety.   hydrocortisone, Perianal, (ANUSOL-HC) 2.5 % cream   Yes Yes   Sig: Place rectally 2 times daily as needed for hemorrhoids.   hydroxychloroquine (PLAQUENIL) 200 MG tablet 6/10/2025 Morning  Yes Yes   Sig: Take 200 mg by mouth 2 times daily.   lidocaine, Anorectal, 5 % CREA   Yes Yes   Sig: Externally apply topically daily as needed.   metoclopramide (REGLAN) 5 MG tablet   Yes Yes   Sig: Take 5 mg by mouth 4 times daily as needed for vomiting.   norethindrone (AYGESTIN) 5 MG tablet 6/10/2025 Morning  Yes Yes   Sig: Take 5 mg by mouth daily   nystatin (MYCOSTATIN) 215985 UNIT/GM external cream   No Yes   Sig: Apply topically 2 times daily as needed for dry skin.   ondansetron (ZOFRAN ODT) 4 MG ODT tab   Yes Yes   Sig: Take 4 mg by mouth every 8 hours as needed for nausea.   pantoprazole (PROTONIX) 40 MG EC tablet 6/10/2025 Morning  Yes Yes   Sig: Take 40 mg by mouth daily.   polyethylene glycol (MIRALAX) 17 GM/Dose powder   Yes Yes   Sig: Take 17 g by mouth daily as needed for constipation   pregabalin (LYRICA) 100 MG capsule 6/10/2025 Morning  Yes Yes   Sig: Take 100 mg by mouth every morning. In addition to evening dose   pregabalin (LYRICA) 100 MG capsule 6/9/2025  Yes Yes   Sig: Take 200 mg by mouth every evening.   sennosides (SENOKOT) 8.6 MG tablet   Yes Yes   Sig: Take 1 tablet by mouth 2 times daily as needed for constipation.   tirzepatide (MOUNJARO) 2.5 MG/0.5ML SOAJ auto-injector pen Not Taking  No No   Sig: Inject 0.5 mLs (2.5 mg) subcutaneously once a week.   Patient not taking: Reported on 6/10/2025      Facility-Administered Medications: None        Review of Systems    The 10 point Review of Systems is negative other than noted in the HPI    Social History   I have reviewed this patient's social history and updated it with  pertinent information if needed.  Social History     Tobacco Use    Smoking status: Never    Smokeless tobacco: Never   Substance Use Topics    Alcohol use: No     Alcohol/week: 0.0 standard drinks of alcohol    Drug use: No         Family History   I have reviewed this patient's family history and updated it with pertinent information if needed.  Family History   Problem Relation Age of Onset    Diabetes Type 2  Maternal Grandmother     Diabetes Type 2  Paternal Grandmother     Breast Cancer Paternal Grandmother     Cerebrovascular Disease Father 53    Myocardial Infarction No family hx of     Coronary Artery Disease Early Onset No family hx of     Ovarian Cancer No family hx of     Colon Cancer No family hx of     Depression Mother     Anxiety Disorder Mother          Allergies   Allergies   Allergen Reactions    Influenza Vaccines Other (See Comments) and Nausea and Vomiting     HUT Reaction: Nausea And Vomiting; HUT Reaction Type: Intolerance; HUT Severity: Low; HUT Noted: [id number]    Latex Rash           Oseltamivir Hives    Penicillins Anaphylaxis    Vancomycin Itching, Swelling and Rash     Flushed face, very itchy    Hydrocodone Nausea and Vomiting and GI Disturbance     vomiting for days    Blood-Group Specific Substance Other (See Comments)     Patient has an anti-Cw and non-specific antibodies. Blood product orders may be delayed. Draw one red top and two purple top tubes for all type/screen/crossmatch orders.  Patient has anti-Cw, anti-K ([place]), Warm auto and nonspecific antibodies. Blood products may be delayed. Draw patient 24 hours prior to transfusion. Draw one red top and two purple top tubes for all type and screen orders.    Clavulanic Acid Angioedema    Haemophilus B Polysaccharide Vaccine Nausea and Vomiting    Iodinated Contrast Media Itching and Other (See Comments)     Vaginal irritation, burning    Iodine Hives and Other (See Comments)    Oxycodone Swelling    Sulfamethoxazole Angioedema and  Other (See Comments)    Trimethoprim Angioedema, Swelling and Other (See Comments)    Adhesive Tape Rash     Silicone type    Adhesive allergy    Silicone type      Other reaction(s): adhesive allergy      Silicone type Adhesive allergy      Silicone type  Other reaction(s): adhesive allergy Other reaction(s): adhesive allergy  Silicone type  Other reaction(s): adhesive allergy  Silicone type  Other reaction(s): adhesive allergy Other reaction(s): adhesive allergy      Silicone type  Other reaction(s): adhesive allergy  Silicone type Adhesive allergy  Silicone type  Other reaction(s): adhesive allergy Other reaction(s): adhesive allergy  Silicone type  Other reaction(s): adhesive allergy  Silicone type  Other reaction(s): adhesive allergy Other reaction(s): adhesive allergy  Silicone type  Other reaction(s): adhesive allergy  Silicone type  Other reaction(s): adhesive allergy Other reaction(s): adhesive allergy    Silicone type  Other reaction(s): adhesive allergy  Silicone type Adhesive allergy      Silicone type  Other reaction(s): adhesive allergy Other reaction(s): adhesive allergy  Silicone type  Other reaction(s): adhesive allergy  Silicone type  Other reaction(s): adhesive allergy Other reaction(s): adhesive allergy      Silicone type  Other reaction(s): adhesive allergy  Silicone type  Other reaction(s): adhesive allergy Other reaction(s): adhesive allergy    Band-Aid Anti-Itch      Other reaction(s): adhesive allergy    Cephalosporins Rash    Lamotrigine Rash     Possibly associated with Lamictal.     Naltrexone Other (See Comments)     Reaction(s): Vivid dreams.        Physical Exam   Vital Signs: Temp: 97  F (36.1  C)   BP: 137/89 Pulse: 102   Resp: 16 SpO2: 98 %      Weight: 0 lbs 0 oz    CONSTITUTIONAL: Pt laying in bed, dressed in hospital garb. Appears comfortable. Cooperative with interview.  HEENT: Normocephalic, atraumatic.   CARDIOVASCULAR: RRR, no murmurs, rubs, or extra heart sounds appreciated.  Pulses +2/4 and regular in upper and lower extremities, bilaterally.   RESPIRATORY: No increased work of breathing.  CTA, bilat; no wheezes, rales, or rhonchi appreciated.  GASTROINTESTINAL:  Abdomen soft, non-distended. BS auscultated in all four quadrants. TTP LLQ, LUQ, epigastric region.   MUSCULOSKELETAL: Able to perform straight leg raise without difficulty. L foot drop. Strength slightly diminished L>R. No gross deformities noted. Normal muscle tone.   HEMATOLOGIC/LYMPHATIC/IMMUNOLOGIC: Negative for lower extremity edema, bilaterally.  NEUROLOGIC: Alert and oriented to person, place, and time. No focal neuro deficits aside from above musculoskeletal findings. Baseline paresthesias at top of left foot and proximal part of top R foot.   SKIN: Warm, dry, intact.       Medical Decision Making       75 MINUTES SPENT BY ME on the date of service doing chart review, history, exam, documentation & further activities per the note.      Data     I have personally reviewed the following data over the past 24 hrs:    9.4  \   14.2   / 293     140 104 10.7 /  88   3.6 25 0.62 \       Imaging results reviewed over the past 24 hrs:   Recent Results (from the past 24 hours)   XR Abdomen 2 Views    Narrative    EXAM: XR ABDOMEN 2 VIEWS  LOCATION: [facility]  DATE: 6/10/2025    INDICATION: eval stool burden, no BM X7 days, not passing flatus. Ensure no obstruction  COMPARISON: 5/20/2025.      Impression    IMPRESSION: Nonobstructive bowel gas pattern. Moderate stool burden. No radiographic evidence of pneumoperitoneum. Cholecystectomy clips.

## 2025-06-10 NOTE — ED PROVIDER NOTES
"  Emergency Department Note      History of Present Illness     Chief Complaint   Leg Pain      HPI   Nevin Alvarado is a 34 year old female with a history of *see Care Plan* who presents to the ED for evaluation of leg pain. Patient was evaluated in the ED last night and admission was recommended, however patient had a home assessment scheduled for today so she opted against admission. However, after waking up today she felt that her bilateral leg pain was bad enough that she needed to return to be admitted. Her pain has not changed since yesterday. She mentions being constipated, and has had to \"pull it out herself\" every time she has had a bowel movement for the past couple of weeks despite her use of laxatives.    Independent Historian   None    Review of External Notes   I reviewed the ED note from 6/9/25    Past Medical History     Medical History and Problem List   ADD  Anorexia  Anxiety  Morbid obesity  Diabetes mellitus type II  Pulmony embolism  Post-traumatic stress disorder  Gastroesophageal reflux disease     Medications   Mycostatin  Mounjaro  Elavil  Eliquis  Aygestin  Lyrica     Surgical History   Mammoplasty reduction  Release carpal tunnel  Ablation endometriosis  Laparotomy exploratory  Sigmoidoscopy flexible  Lymph nodes removed from neck  Knee surgery  Laparotomy   Abdomen surgery  Knee surgery  CVC tunnel placement  PICC midline placement  Lumbar puncture   Esophagogastroduodenoscopy x 63     Physical Exam     Patient Vitals for the past 24 hrs:   BP Temp Pulse Resp SpO2   06/10/25 0825 137/89 97  F (36.1  C) 102 16 98 %     Physical Exam  General: Overweight young woman sitting in her chair with her legs bent under her, complaining of weakness and discomfort.    Eye:  Pupils are equal, round, and reactive.  Extraocular movements intact.    ENT:  No rhinorrhea.  Moist mucus membranes.  Normal tongue and tonsil.    Cardiac:  Regular rate and rhythm.  No murmurs, gallops, or " rubs.    Pulmonary:  Clear to auscultation bilaterally.  No wheezes, rales, or rhonchi.    Abdomen:  Positive bowel sounds.  Abdomen is soft and non-distended, without focal tenderness.    Musculoskeletal:  Normal movement of all extremities without evidence for deficit.    Skin:  Warm and dry without rashes.    Neurologic: Cranial nerves II through XII are intact.  Her sensation is grossly intact.  She has appropriate strength in isolation, but struggles with gait.    Psychiatric: Very anxious affect with otherwise appropriate interaction with examiner.      Diagnostics     Lab Results   Labs Ordered and Resulted from Time of ED Arrival to Time of ED Departure - No data to display    Imaging   No orders to display     Independent Interpretation   None    ED Course      Medications Administered   Medications - No data to display    Procedures   Procedures     Discussion of Management   Admitting Hospitalist, Dr. Powell    ED Course   ED Course as of 06/10/25 0942   Tue Kaden 10, 2025   0908 I obtained the history and performed the examination as described.    0996 I spoke with Dr. Powell from the hospitalist service regarding the patient's presentation, findings here in the ED, and plan of care.        Additional Documentation  None    Medical Decision Making / Diagnosis     CMS Diagnoses: None    MIPS   None    MDM   Nevin Alvarado is a 34 year old female returning the emergency department for admission.  I invite the reader to review her visit to the ER last night where the recommendation was for admission to the hospital for further workup of exacerbation of her chronic inflammatory demyelinating polyneuropathy.  However, she elected to be discharged at that time, returning today for admission.  She does not note any significant change in her symptoms.    She appears stable on exam.  I have elected not to repeat blood work as these studies were all negative yesterday.  Neurology has recommended MRI of the  spine.  I will defer to the admitting team and neurology consultation for the studies.  Otherwise, patient is comfort with the plan for admission.  She voices a secondary concern of some constipation and we will start her on a bowel regimen.  I spoke with hospital medicine who graciously agrees accept care of the patient.    Disposition   The patient was admitted to the hospital.     Diagnosis     ICD-10-CM    1. Leg weakness, bilateral  R29.898     ACUTE ON CHRONIC      2. Chronic inflammatory demyelinating polyneuropathy (H)  G61.81     HISTORY OF           Discharge Medications   New Prescriptions    No medications on file         Scribe Disclosure:  I, Nino Lovell, am serving as a scribe at 9:21 AM on 6/10/2025 to document services personally performed by Trierweiler, Chad A, MD based on my observations and the provider's statements to me.        Trierweiler, Chad A, MD  06/10/25 [id number]

## 2025-06-10 NOTE — PHARMACY-ADMISSION MEDICATION HISTORY
Pharmacist Admission Medication History    Admission medication history is complete. The information provided in this note is only as accurate as the sources available at the time of the update.    Information Source(s): Patient and CareEverywhere/SureScripts via in-person    Pertinent Information: Patient reports she has been taking a pause from Mounjaro for several weeks and when she resumes it she plans to restart at the 2.5 mg dose and work her way up    Changes made to PTA medication list:  Added: cyclobenzaprine, senna, docusate, acetaminophen, ammonium lactate, buspiron, hydroxychloroquine, lidocaine and hydrocortisone rectal, metoclopramide, ondansetron, pantoprazole  Deleted: tizanidine, psyllium, albuterol inhaler  Changed: amitriptyline 35 to 50 mg, cetirizine qd to bid, pregabalin 100 mg TID to 100 mg in AM and 200 mg in PM    Allergies reviewed with patient and updates made in EHR: no    Medication History Completed By: Johanny Mesa RPH 6/10/2025 11:09 AM    PTA Med List   Medication Sig Last Dose/Taking    acetaminophen (TYLENOL) 500 MG tablet Take 500-1,000 mg by mouth every 6 hours as needed for mild pain. Taking As Needed    albuterol (PROVENTIL) (2.5 MG/3ML) 0.083% neb solution Take 1 vial (2.5 mg) by nebulization every 6 hours as needed for shortness of breath or wheezing Taking As Needed    amitriptyline (ELAVIL) 50 MG tablet Take 50 mg by mouth at bedtime. 6/9/2025    ammonium lactate (AMLACTIN) 12 % external cream Apply topically 2 times daily. Apply to feet Past Week    apixaban ANTICOAGULANT (ELIQUIS) 5 MG tablet Take 5 mg by mouth 2 times daily. 6/10/2025 Morning    busPIRone (BUSPAR) 15 MG tablet Take 15 mg by mouth 2 times daily. 6/10/2025 Morning    cetirizine (ZYRTEC) 10 MG tablet Take 10 mg by mouth 2 times daily. 6/10/2025 Morning    cholecalciferol 50 MCG (2000 UT) CAPS Take 50 mcg by mouth daily. 6/10/2025    clonazePAM (KLONOPIN) 0.5 MG tablet Take 1 tablet (0.5 mg) by mouth daily as  needed for anxiety Taking As Needed    cyanocobalamin (VITAMIN B-12) 1000 MCG tablet Take 1,000 mcg by mouth daily. 6/10/2025    cyclobenzaprine (FLEXERIL) 5 MG tablet Take 5 mg by mouth 3 times daily as needed for muscle spasms. Taking As Needed    docusate sodium (COLACE) 100 MG capsule Take 100 mg by mouth daily. 6/10/2025 Morning    ferrous sulfate (FEROSUL) 325 (65 Fe) MG tablet Take 1 tablet (325 mg) by mouth daily (with breakfast) 6/10/2025    hydrocortisone, Perianal, (ANUSOL-HC) 2.5 % cream Place rectally 2 times daily as needed for hemorrhoids. Taking As Needed    hydroxychloroquine (PLAQUENIL) 200 MG tablet Take 200 mg by mouth 2 times daily. 6/10/2025 Morning    hydrOXYzine HCl (ATARAX) 25 MG tablet Take 25 mg by mouth every 8 hours as needed for anxiety. Taking As Needed    lidocaine, Anorectal, 5 % CREA Externally apply topically daily as needed. Taking As Needed    metoclopramide (REGLAN) 5 MG tablet Take 5 mg by mouth 4 times daily as needed for vomiting. Taking As Needed    norethindrone (AYGESTIN) 5 MG tablet Take 5 mg by mouth daily 6/10/2025 Morning    nystatin (MYCOSTATIN) 485613 UNIT/GM external cream Apply topically 2 times daily as needed for dry skin. Taking As Needed    ondansetron (ZOFRAN ODT) 4 MG ODT tab Take 4 mg by mouth every 8 hours as needed for nausea. Taking As Needed    pantoprazole (PROTONIX) 40 MG EC tablet Take 40 mg by mouth daily. 6/10/2025 Morning    polyethylene glycol (MIRALAX) 17 GM/Dose powder Take 17 g by mouth daily as needed for constipation Taking As Needed    pregabalin (LYRICA) 100 MG capsule Take 200 mg by mouth every evening. 6/9/2025    pregabalin (LYRICA) 100 MG capsule Take 100 mg by mouth every morning. In addition to evening dose 6/10/2025 Morning    Prenatal Vit-Fe Fumarate-FA (PRENATAL MULTIVITAMIN W/IRON) 27-0.8 MG tablet Take 1 tablet by mouth daily. 6/10/2025    sennosides (SENOKOT) 8.6 MG tablet Take 1 tablet by mouth 2 times daily as needed for  constipation. Taking As Needed    Sodium Phosphates (FLEET ENEMA) ENEM Place 1 enema rectally as needed (constipation). Taking As Needed

## 2025-06-10 NOTE — CONSULTS
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 Exercise per Session: 10 min   Social Connections: Socially Integrated (3/2/2025)    Received from  [facility]    Social Connections     Do you often feel lonely or isolated from those around you?: 0   Interpersonal Safety: Not At Risk (11/12/2024)    Received from [facility]    Humiliation, Afraid, Rape, and Kick questionnaire     Fear of Current or Ex-Partner: No     Emotionally Abused: No     Physically Abused: No     Sexually Abused: No   Housing Stability: Low Risk  (3/2/2025)    Received from [facility]    Housing Stability     What is your housing situation today?: 1     Patient denies smoking, no significant alcohol intake, denies illicit drugs use       Family History:     Family History   Problem Relation Age of Onset    Diabetes Type 2  Maternal Grandmother     Diabetes Type 2  Paternal Grandmother     Breast Cancer Paternal Grandmother     Cerebrovascular Disease Father 53    Myocardial Infarction No family hx of     Coronary Artery Disease Early Onset No family hx of     Ovarian Cancer No family hx of     Colon Cancer No family hx of     Depression Mother     Anxiety Disorder Mother      Reviewed and not felt to be contributory.        Home Medications:     Prior to Admission Medications   Prescriptions Last Dose Informant Patient Reported? Taking?   MOUNJARO 5 MG/0.5ML SOAJ Not Taking  No No   Sig: Inject 0.5 mLs (5 mg) subcutaneously every 7 days.   Patient not taking: Reported on 6/10/2025   Prenatal Vit-Fe Fumarate-FA (PRENATAL MULTIVITAMIN W/IRON) 27-0.8 MG tablet 6/10/2025  No Yes   Sig: Take 1 tablet by mouth daily.   Sodium Phosphates (FLEET ENEMA) ENEM   Yes Yes   Sig: Place 1 enema rectally as needed (constipation).   Tirzepatide (MOUNJARO) 7.5 MG/0.5ML SOAJ auto-injector pen Not Taking  No No   Sig: Inject 0.5 mLs (7.5 mg) subcutaneously once a week.   Patient not taking: Reported on 6/10/2025   acetaminophen (TYLENOL) 500 MG tablet   Yes Yes   Sig: Take 500-1,000 mg by mouth every 6 hours as needed for mild pain.    albuterol (PROVENTIL) (2.5 MG/3ML) 0.083% neb solution   No Yes   Sig: Take 1 vial (2.5 mg) by nebulization every 6 hours as needed for shortness of breath or wheezing   amitriptyline (ELAVIL) 50 MG tablet 6/9/2025  Yes Yes   Sig: Take 50 mg by mouth at bedtime.   ammonium lactate (AMLACTIN) 12 % external cream Past Week  Yes Yes   Sig: Apply topically 2 times daily. Apply to feet   apixaban ANTICOAGULANT (ELIQUIS) 5 MG tablet 6/10/2025 Morning  Yes Yes   Sig: Take 5 mg by mouth 2 times daily.   busPIRone (BUSPAR) 15 MG tablet 6/10/2025 Morning  Yes Yes   Sig: Take 15 mg by mouth 2 times daily.   cetirizine (ZYRTEC) 10 MG tablet 6/10/2025 Morning  Yes Yes   Sig: Take 10 mg by mouth 2 times daily.   cholecalciferol 50 MCG (2000 UT) CAPS 6/10/2025  Yes Yes   Sig: Take 50 mcg by mouth daily.   clonazePAM (KLONOPIN) 0.5 MG tablet   No Yes   Sig: Take 1 tablet (0.5 mg) by mouth daily as needed for anxiety   cyanocobalamin (VITAMIN B-12) 1000 MCG tablet 6/10/2025  Yes Yes   Sig: Take 1,000 mcg by mouth daily.   cyclobenzaprine (FLEXERIL) 5 MG tablet   Yes Yes   Sig: Take 5 mg by mouth 3 times daily as needed for muscle spasms.   docusate sodium (COLACE) 100 MG capsule 6/10/2025 Morning  Yes Yes   Sig: Take 100 mg by mouth daily.   ferrous sulfate (FEROSUL) 325 (65 Fe) MG tablet 6/10/2025 Self No Yes   Sig: Take 1 tablet (325 mg) by mouth daily (with breakfast)   hydrOXYzine HCl (ATARAX) 25 MG tablet   Yes Yes   Sig: Take 25 mg by mouth every 8 hours as needed for anxiety.   hydrocortisone, Perianal, (ANUSOL-HC) 2.5 % cream   Yes Yes   Sig: Place rectally 2 times daily as needed for hemorrhoids.   hydroxychloroquine (PLAQUENIL) 200 MG tablet 6/10/2025 Morning  Yes Yes   Sig: Take 200 mg by mouth 2 times daily.   lidocaine, Anorectal, 5 % CREA   Yes Yes   Sig: Externally apply topically daily as needed.   metoclopramide (REGLAN) 5 MG tablet   Yes Yes   Sig: Take 5 mg by mouth 4 times daily as needed for vomiting.    norethindrone (AYGESTIN) 5 MG tablet 6/10/2025 Morning  Yes Yes   Sig: Take 5 mg by mouth daily   nystatin (MYCOSTATIN) 719840 UNIT/GM external cream   No Yes   Sig: Apply topically 2 times daily as needed for dry skin.   ondansetron (ZOFRAN ODT) 4 MG ODT tab   Yes Yes   Sig: Take 4 mg by mouth every 8 hours as needed for nausea.   pantoprazole (PROTONIX) 40 MG EC tablet 6/10/2025 Morning  Yes Yes   Sig: Take 40 mg by mouth daily.   polyethylene glycol (MIRALAX) 17 GM/Dose powder   Yes Yes   Sig: Take 17 g by mouth daily as needed for constipation   pregabalin (LYRICA) 100 MG capsule 6/10/2025 Morning  Yes Yes   Sig: Take 100 mg by mouth every morning. In addition to evening dose   pregabalin (LYRICA) 100 MG capsule 6/9/2025  Yes Yes   Sig: Take 200 mg by mouth every evening.   sennosides (SENOKOT) 8.6 MG tablet   Yes Yes   Sig: Take 1 tablet by mouth 2 times daily as needed for constipation.   tirzepatide (MOUNJARO) 2.5 MG/0.5ML SOAJ auto-injector pen Not Taking  No No   Sig: Inject 0.5 mLs (2.5 mg) subcutaneously once a week.   Patient not taking: Reported on 6/10/2025      Facility-Administered Medications: None          Allergy:     Allergies   Allergen Reactions    Influenza Vaccines Other (See Comments) and Nausea and Vomiting     HUT Reaction: Nausea And Vomiting; HUT Reaction Type: Intolerance; HUT Severity: Low; [facility] Noted: [id number]    Latex Rash           Oseltamivir Hives    Penicillins Anaphylaxis    Vancomycin Itching, Swelling and Rash     Flushed face, very itchy    Hydrocodone Nausea and Vomiting and GI Disturbance     vomiting for days    Blood-Group Specific Substance Other (See Comments)     Patient has an anti-Cw and non-specific antibodies. Blood product orders may be delayed. Draw one red top and two purple top tubes for all type/screen/crossmatch orders.  Patient has anti-Cw, anti-K (Camilla), Warm auto and nonspecific antibodies. Blood products may be delayed. Draw patient 24 hours prior to  transfusion. Draw one red top and two purple top tubes for all type and screen orders.    Clavulanic Acid Angioedema    Haemophilus B Polysaccharide Vaccine Nausea and Vomiting    Iodinated Contrast Media Itching and Other (See Comments)     Vaginal irritation, burning    Iodine Hives and Other (See Comments)    Oxycodone Swelling    Sulfamethoxazole Angioedema and Other (See Comments)    Trimethoprim Angioedema, Swelling and Other (See Comments)    Adhesive Tape Rash     Silicone type    Adhesive allergy    Silicone type      Other reaction(s): adhesive allergy      Silicone type Adhesive allergy      Silicone type  Other reaction(s): adhesive allergy Other reaction(s): adhesive allergy  Silicone type  Other reaction(s): adhesive allergy  Silicone type  Other reaction(s): adhesive allergy Other reaction(s): adhesive allergy      Silicone type  Other reaction(s): adhesive allergy  Silicone type Adhesive allergy  Silicone type  Other reaction(s): adhesive allergy Other reaction(s): adhesive allergy  Silicone type  Other reaction(s): adhesive allergy  Silicone type  Other reaction(s): adhesive allergy Other reaction(s): adhesive allergy  Silicone type  Other reaction(s): adhesive allergy  Silicone type  Other reaction(s): adhesive allergy Other reaction(s): adhesive allergy    Silicone type  Other reaction(s): adhesive allergy  Silicone type Adhesive allergy      Silicone type  Other reaction(s): adhesive allergy Other reaction(s): adhesive allergy  Silicone type  Other reaction(s): adhesive allergy  Silicone type  Other reaction(s): adhesive allergy Other reaction(s): adhesive allergy      Silicone type  Other reaction(s): adhesive allergy  Silicone type  Other reaction(s): adhesive allergy Other reaction(s): adhesive allergy    Band-Aid Anti-Itch      Other reaction(s): adhesive allergy    Cephalosporins Rash    Lamotrigine Rash     Possibly associated with Lamictal.     Naltrexone Other (See Comments)      Reaction(s): Vivid dreams.          Inpatient Medications:   Scheduled Meds:  Current Facility-Administered Medications   Medication Dose Route Frequency Provider Last Rate Last Admin    amitriptyline (ELAVIL) tablet 50 mg  50 mg Oral At Bedtime Toya Powell PA-C        apixaban ANTICOAGULANT (ELIQUIS) tablet 5 mg  5 mg Oral BID Toya Powell PA-C        busPIRone (BUSPAR) tablet 15 mg  15 mg Oral BID Toya Powell PA-C        cetirizine (zyrTEC) tablet 10 mg  10 mg Oral BID Toya Powell PA-C        hydroxychloroquine (PLAQUENIL) tablet 200 mg  200 mg Oral BID Toya Powell PA-C        [START ON 6/11/2025] norethindrone (AYGESTIN) tablet 5 mg  5 mg Oral Daily Toya Powell PA-C        [START ON 6/11/2025] pantoprazole (PROTONIX) EC tablet 40 mg  40 mg Oral Daily Toya Powell PA-C        polyethylene glycol (MIRALAX) Packet 17 g  17 g Oral TID Keyonna Caban PA-C   17 g at 06/10/25 1710    [START ON 6/11/2025] pregabalin (LYRICA) capsule 100 mg  100 mg Oral QAM Toya Powell PA-C        [START ON 6/11/2025] pregabalin (LYRICA) capsule 100 mg  100 mg Oral Daily with lunch Toya Powell PA-C        pregabalin (LYRICA) capsule 200 mg  200 mg Oral QPM Toya Powell PA-C        senna-docusate (SENOKOT-S/PERICOLACE) 8.6-50 MG per tablet 1 tablet  1 tablet Oral BID Toya oPwell PA-C   1 tablet at 06/10/25 1441    Or    senna-docusate (SENOKOT-S/PERICOLACE) 8.6-50 MG per tablet 2 tablet  2 tablet Oral BID Toya Powell PA-C        sodium chloride (PF) 0.9% PF flush 3 mL  3 mL Intracatheter Q8H TOSHIA Toya Powell PA-C         PRN Meds:   Current Facility-Administered Medications   Medication Dose Route Frequency Provider Last Rate Last Admin    acetaminophen (TYLENOL) tablet 650 mg  650 mg Oral Q4H PRN Toya Powell PA-C        Or    acetaminophen (TYLENOL)  Suppository 650 mg  650 mg Rectal Q4H PRN Toya Powell PA-C        albuterol (PROVENTIL) neb solution 2.5 mg  2.5 mg Nebulization Q6H PRN Toya Powell PA-C        alum & mag hydroxide-simethicone (MAALOX) suspension 30 mL  30 mL Oral Q4H PRN Toya Powell PA-C        bisacodyl (DULCOLAX) suppository 10 mg  10 mg Rectal Daily PRN Toya Powell PA-C        calcium carbonate (TUMS) chewable tablet 1,000 mg  1,000 mg Oral 4x Daily PRN Toya Powell PA-C        clonazePAM (klonoPIN) tablet 0.5 mg  0.5 mg Oral Daily PRN Toya Powell PA-C        cyclobenzaprine (FLEXERIL) tablet 5 mg  5 mg Oral TID PRN Toya Powell PA-C        hydrOXYzine HCl (ATARAX) tablet 25 mg  25 mg Oral Q8H PRN Toya Powell PA-C        ketorolac (TORADOL) injection 15 mg  15 mg Intravenous Q6H PRN Toya Powell PA-C   15 mg at 06/10/25 1444    lidocaine 1 % 0.1-1 mL  0.1-1 mL Other Q1H PRN Toya Powell PA-C        ondansetron (ZOFRAN ODT) ODT tab 4 mg  4 mg Oral Q6H PRN Toya Powell PA-C        Or    ondansetron (ZOFRAN) injection 4 mg  4 mg Intravenous Q6H PRN Toya Powell PA-C        Patient is already receiving anticoagulation with heparin, enoxaparin (LOVENOX), warfarin (COUMADIN)  or other anticoagulant medication   Does not apply Continuous PRN DeionmToya PA-C        senna-docusate (SENOKOT-S/PERICOLACE) 8.6-50 MG per tablet 1 tablet  1 tablet Oral BID PRN Toya Powell PA-C        Or    senna-docusate (SENOKOT-S/PERICOLACE) 8.6-50 MG per tablet 2 tablet  2 tablet Oral BID PRN Toya Powell PA-C        sodium chloride (PF) 0.9% PF flush 3 mL  3 mL Intracatheter q1 min prn Toya Powell PA-C                 Physical Exam:   Physical Exam   Vitals:  Height:Data Unavailable  Weight:0 lbs 0 oz   Temp: 98.7  F (37.1  C) Temp src: Oral BP: (!) 161/112  Pulse: 99   Resp: 16 SpO2: 98 % O2 Device: None (Room air)    General Appearance: No acute distress, normal body habitus  Neuro Exam:  Mental Status Exam: Alert and oriented. Language and speech intact. Fund of knowledge normal.  Cranial Nerves:   Pupils are equal and reactive to light. Extraocular movements intact. Facial strength and sensation is normal. No jaw or tongue deviation.  Motor: Motor tone and bulk are intact in extremities.  Intact strength in the bilateral upper extremities except some minor finger tension weakness of the left upper extremity.  In the lower extremities 4/5 strength of hip flexion, knee flexion, knee extension.  Dorsiflexion of the left lower extremity 3/5.  Plantarflexion 4/5 bilaterally.   Reflexes: Intact reflexes in the upper extremities, 2/4  in the patella and  0/4 Achilles bilaterally.  Toes are mute bilaterally.  Sensory: Vibration diminished in the right great toe, absent in the left great toe.  Minimally diminished in the ankles bilaterally..  Coordination: Coordination intact to finger-nose-finger.  Gait: Requires use of the walker with a wide base gait and reduced heel strike of the left leg.  Neck: No nuchal rigidity  Extremities: No clubbing, no cyanosis, no edema          Data:   ROUTINE IP LABS   CBC RESULTS:     Recent Labs   Lab 06/09/25 2020   WBC 9.4   RBC 4.62   HGB 14.2   HCT 41.0        Basic Metabolic Panel:   Recent Labs   Lab Test 06/09/25 2020 06/01/25 1948 05/20/25 1931    139 138   POTASSIUM 3.6 3.9 4.0   CHLORIDE 104 104 104   CO2 25 25 22   BUN 10.7 12.3 11.7   CR 0.62 0.60 0.55   GLC 88 105* 166*   KELBY 9.6 9.1 9.7     Liver panel:  Recent Labs   Lab Test 05/20/25  1931 01/11/25  0602 12/30/24  0327 11/11/24  1541 10/12/24  2303   PROTTOTAL 7.3 6.4 6.6 7.3 7.1   ALBUMIN 4.6 4.2 4.1 4.5 4.3   BILITOTAL 0.2 0.4 0.3 0.3 0.2   ALKPHOS 76 70 74 81 88   AST 17 14 18 28 17   ALT 41 26 25 68* 30     Lipid Profile:  Recent Labs   Lab Test  02/11/22  0844 11/30/20  0823 03/21/18  0802   CHOL 132 130 132   HDL 41* 44* 48*   LDL 38 50 59   TRIG 266* 182* 125     Thyroid Panel:  Recent Labs   Lab Test 10/12/24  2303 06/27/23  2252 02/11/22  0844 11/30/20  0823 10/07/20  0041 09/08/20  2215 02/15/20  2324   TSH 4.54* 4.47* 4.94* 3.19 5.18* 6.96* 4.56*   T4 1.38 1.17 0.87  --   --  0.94 0.78      Vitamin B12:   Recent Labs   Lab Test 05/15/25  1039 04/07/24  1823 02/11/22  0844   B12 1,033 <150* 203      CSF: elevated protein, no cells, increased albumin index, normal IgG index, raised albumin, negative cytology, protein 57, normal glucose, negative neuromyelitis optica antibody, negative meningitic profile, negative VDRL (03/2024), 0 oligoclonal bands   CK: 57 (03/2024)  TSH: 4.54 (03/2024)  JULIA: negative (03/2024)  Ganglioside antibody: negative (03/2024)       DIAGNOSTIC  EMG: axon loss, 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   MRI 12/2024  CERVICAL SPINE MRI:   1.  Mild spinal canal and right neural foraminal stenosis at C5-C6.     THORACIC SPINE MRI:   1.  Minor degenerative changes without significant spinal canal stenosis.   2.  Multilevel facet hypertrophy with scattered moderate neural foraminal stenoses, greatest at T7-T8 and T10-T11.     LUMBAR SPINE MRI:   1.  Transitional lumbosacral anatomy.   2.  Persistent enlargement of the bilateral lumbosacral nerve roots. This is similar prior and consistent with known history of CIDP.     Time:  76minutes evaluation and management.     Nevin Gamble M.D.  [facility]  Office [phone]

## 2025-06-10 NOTE — PROGRESS NOTES
RECEIVING UNIT ED HANDOFF REVIEW    ED Nurse Handoff Report was reviewed by: Jb Ly RN on Candice 10, 2025 at 2:53 PM

## 2025-06-10 NOTE — ED PROVIDER NOTES
"  Emergency Department Note      History of Present Illness     Chief Complaint   Leg Pain      HPI   Nevin Alvarado is a 34 year old female with a history of bilateral leg weakness, right leg paresthesias, right lower back pain amongst others who presents to the ED for leg weakness. The patient reports suffering from neuropathy in her legs for years however it has worsened this last week. She describes her legs beeing shaky and weak with standing and especially with ambulating, having sensations of stumbling backwards that started a few months ago. She reports that she has fallen twice while ambulating in the last two days due to the weakness, and is concerned due to living alone. She also notes having to use a mobility scooter and walker because of the weakness, but is sometimes able to independently ambulate. She endorses dysuria, having to put her knees to her chest to urinate at all, constipation, left lower quadrant abdominal pain, numbness in her legs when in certain positions, nausea, and pain radiating from her hip to her ankle. She mentions having to intermittently self disimpact with \"3-4 balls\" of stool at a time, intermittent use of enema's along with use of Miralax, colace, and Senna daily.  She denies any vomiting. Of note, she has seen many neurologists with no alleviation of symptoms, with plans for MRI imaging on 06/30/2025 with her current neurologist for the neuropathy. Patient is noted to take Lyrica daily for \"many years\" with recent discontinuation of GLP1's due to frequent missed doses in relation to hospitalizations and clinic visits.     Independent Historian   None    Review of External Notes   I reviewed patient's neurology note from 5/15/2025 with Dr. Chance.  Patient with a history of chronic inflammatory demyelinating polyneuropathy.  Initially diagnosed in 2015 with neurologic symptoms in lower extremities.  She had IVIG between 2015 and 2020 with weekly to biweekly IVIG " infusions during that time.  She ultimately discontinued IVIG because of lack of efficacy but symptoms have been progressive since 2020.  In 2024 she had cauda equina thickening and worsening sensorimotor peripheral neuropathy.  She was treated with plasma exchange for 7 sessions and had some improvement in strength following plasma exchange.  During this visit in May 2025 she had generalized weakness in her legs with the left side being more affected.  She has tremor in her hands making it difficult to hold objects and has foot drop.  She also has a history of pelvic floor dysfunction leading to severe constipation which she manages with with laxatives and stool softeners.  Patient has been on Lyrica which has not been effective and also historically been on IVIG.  She also had a history of low vitamin B12 but this was tested 3 weeks ago and found to be normal.  At this appointment they discussed potential IVIG treatments but confirmatory tests would be necessary before consideration of treatment.  There is also recommendation of ordering outpatient EMG to assess for nerve function with MRI of the whole spine with contrast to evaluate for nerve root thickening and a CIDP specific autoimmune panel.    Past Medical History     Medical History and Problem List   ADD       Anorexia nervosa with bulimia              Anxiety              Asthma              Borderline personality disorder             Depression        Diabetes mellitus          Eating disorder              h/o Suicide attempt  History of pulmonary embolism            Neuropathy       Obesity              PTSD     Pulmonary embolism    Rectal foreign body - Recurrent issue, self placed      Self-injurious behavior              Sleep apnea      Suicidal attempt by overdose  Swallowed foreign body - Recurrent issue, self placed           Meralgia paresthetica  Chronic anticoagulation  ZOHRA  CIDP  Endometriosis  Scoliosis  Factitious disorder     Medications    Zanaflex  Albuterol  Proventil  Klonopin  Ferosul  Mounjaro  Mycostatin  Elavil  Eliquis  Atarax  Aygestin  Lyrica  Valtrex  Protonix  Colace  Buspar  Hydroxychloroquine  Amitriptyline  Tramadol  Singulair  Flexeril  Dilaudid  Ozempic  Levaquin  Ajovy  Symmetrel  Reglan    Surgical History   Carpal tunnel release  Breast reduction  Lymphadenectomy  Esophagogastroduodenoscopy with removal of foreign body  Esophagoscopy  Laparoscopy appendectomy with foreign body  Cholecystectomy  ([place]) [place] removal gallbladder  Left knee arthroscopy  IR central venous catheter tunnel port removal    Physical Exam     Patient Vitals for the past 24 hrs:   BP Temp Temp src Pulse Resp SpO2 Weight   06/09/25 1949 (!) 150/83 -- -- 99 18 100 % --   06/09/25 1749 135/82 97.6  F (36.4  C) Oral 97 18 97 % 102.5 kg (226 lb)     Physical Exam  General: Alert, appears well-developed and well-nourished. Cooperative.     In mild distress  HEENT:  Head:  Atraumatic  Ears:  External ears are normal  Mouth/Throat:  Oropharynx is without erythema or exudate and mucous membranes are moist.   Eyes:   Conjunctivae normal and EOM are normal. No scleral icterus.    Pupils are equal, round, and reactive to light.   CV:  Normal rate, regular rhythm, normal heart sounds and radial pulses are 2+ and symmetric.  No murmur.  Resp:  Breath sounds are clear bilaterally    Non-labored, no retractions or accessory muscle use  GI:  Abdomen is soft, no distension, no tenderness. No rebound or guarding.  No CVA tenderness bilaterally  MS:  Normal range of motion. No edema.    Normal strength in all 4 extremities.     Back atraumatic.    No midline cervical, thoracic, or lumbar tenderness  Skin:  Warm and dry.  No rash or lesions noted.  Neuro:   Alert. Normal strength.  She has unstable gait with independent ambulation.  Gross sensation intact in all 4 extremities. GCS: 15  Psych: Normal mood and affect.    Diagnostics     Lab Results   Labs Ordered and Resulted from  Time of ED Arrival to Time of ED Departure   ROUTINE UA WITH MICROSCOPIC REFLEX TO CULTURE - Abnormal       Result Value    Color Urine Light Yellow      Appearance Urine Clear      Glucose Urine Negative      Bilirubin Urine Negative      Ketones Urine Negative      Specific Gravity Urine 1.021      Blood Urine Negative      pH Urine 6.0      Protein Albumin Urine Negative      Urobilinogen Urine Normal      Nitrite Urine Negative      Leukocyte Esterase Urine Negative      Bacteria Urine Few (*)     Mucus Urine Present (*)     RBC Urine 1      WBC Urine 2      Squamous Epithelials Urine <1     BASIC METABOLIC PANEL - Normal    Sodium 140      Potassium 3.6      Chloride 104      Carbon Dioxide (CO2) 25      Anion Gap 11      Urea Nitrogen 10.7      Creatinine 0.62      GFR Estimate >90      Calcium 9.6      Glucose 88     CBC WITH PLATELETS AND DIFFERENTIAL    WBC Count 9.4      RBC Count 4.62      Hemoglobin 14.2      Hematocrit 41.0      MCV 89      MCH 30.7      MCHC 34.6      RDW 13.7      Platelet Count 293      % Neutrophils 70      % Lymphocytes 23      % Monocytes 5      % Eosinophils 2      % Basophils 0      % Immature Granulocytes 0      NRBCs per 100 WBC 0      Absolute Neutrophils 6.5      Absolute Lymphocytes 2.2      Absolute Monocytes 0.4      Absolute Eosinophils 0.2      Absolute Basophils 0.0      Absolute Immature Granulocytes 0.0      Absolute NRBCs 0.0         Imaging   No orders to display     Independent Interpretation   None    ED Course      Medications Administered   Medications - No data to display    Procedures   Procedures     Discussion of Management   Hernesto Pérez    ED Course   ED Course as of 06/09/25 2205 Mon Jun 09, 2025 1952 I obtained history and examined the patient as noted above     2006 I rechecked the patient.   2043 I spoke with neurology. They want admission for general neurology consultation and decision on MRI in hospital vs outpatient MRI/EEG.   2142  I spoke with the neurology team who noted that the patient has an appointment tomorrow and does not want to be admitted tonight. Will discharge.    2147 I rechecked the patient.         Additional Documentation  None    Medical Decision Making / Diagnosis     CMS Diagnoses: None    MIPS   None               MDM   Nevin Alvarado is a 34 year old female with a complex past medical history pertinent for CIDP, anxiety, chronic pain, and self-injurious behaviors who presents with gait instability and worsening neuropathy in her lower extremities.  Patient has a very complex neurologic history and I reviewed Dr. Chance's note from May 2025.  Patient is scheduled for outpatient MRIs in the next several weeks but has been having increasing falls in the last 2 days given gait instability.  I spoke briefly with [facility], [facility], Dr. Eric who would recommended observation admission for general neurology consultation to help determine urgency of MRI imaging particularly given worsening gait instability in the last 2 days.  Patient has had minimal improvement with IVIG infusions historically.  She additionally has had minimal improvement with plasma exchange therapy in the past as well.  Given her worsening neuropathy tonight, we did obtain laboratory work which shows no acute electrolyte derangement.  No renal dysfunction or CBC abnormality.  She is having some urinary incontinence and so urinalysis is obtained as well.  Urinalysis shows no sign of infection.  Vital signs stable here.  On my neurologic evaluation patient does have an unsteady gait but does not have any falls while in the emergency department.  I did recommend observation admission tonight and spoke with Dr. Voss of the hospitalist service.  He spoke with the patient but the patient was declining admission citing that she had a technology intake assessment at her house in the AM she cannot rearrange this appointment.  She  felt comfortable with discharging home tonight and understands that we do not have a clear diagnosis for her worsening gait and fall in the last two days.  She does understand that she is welcome to return to the emergency department at any time for repeat neurologic assessment.  At this point given no focal neurologic findings would not obtain advanced MRI imaging emergently, but rather defer to general neurology consultation, particularly as she requires full sedation to assist with MRI imaging.  After all questions answered & return precautions understood, discharged home.    Disposition   The patient was discharged.     Diagnosis     ICD-10-CM    1. Abnormal gait  R26.9       2. CIDP (chronic inflammatory demyelinating polyneuropathy) (H)  G61.81            Discharge Medications   New Prescriptions    No medications on file         Scribe Disclosure:  I, Dejon Mcleod, am serving as a scribe at 9:44 PM on 6/9/2025 to document services personally performed by Yuniel Robertson MD based on my observations and the provider's statements to me.     Scribe Disclosure:  I, Edinson Radford, am serving as a scribe  for Dejon Mcleod at 8:38 PM on 6/9/2025 to document services personally performed by Yuniel Robertson MD based on my observations and the provider's statements to me.          Yuniel Robertson MD  06/10/25 0130

## 2025-06-11 ENCOUNTER — ANESTHESIA EVENT (OUTPATIENT)
Dept: SURGERY | Facility: CLINIC | Age: 35
End: 2025-06-11
Payer: COMMERCIAL

## 2025-06-11 LAB
ANION GAP SERPL CALCULATED.3IONS-SCNC: 14 MMOL/L (ref 7–15)
BASOPHILS # BLD AUTO: 0 10E3/UL (ref 0–0.2)
BASOPHILS NFR BLD AUTO: 0 %
BUN SERPL-MCNC: 13.1 MG/DL (ref 6–20)
CALCIUM SERPL-MCNC: 9 MG/DL (ref 8.8–10.4)
CHLORIDE SERPL-SCNC: 106 MMOL/L (ref 98–107)
CREAT SERPL-MCNC: 0.64 MG/DL (ref 0.51–0.95)
EGFRCR SERPLBLD CKD-EPI 2021: >90 ML/MIN/1.73M2
EOSINOPHIL # BLD AUTO: 0.2 10E3/UL (ref 0–0.7)
EOSINOPHIL NFR BLD AUTO: 3 %
ERYTHROCYTE [DISTWIDTH] IN BLOOD BY AUTOMATED COUNT: 14 % (ref 10–15)
GLUCOSE SERPL-MCNC: 138 MG/DL (ref 70–99)
HCO3 SERPL-SCNC: 20 MMOL/L (ref 22–29)
HCT VFR BLD AUTO: 43 % (ref 35–47)
HGB BLD-MCNC: 14.5 G/DL (ref 11.7–15.7)
IMM GRANULOCYTES # BLD: 0 10E3/UL
IMM GRANULOCYTES NFR BLD: 0 %
LYMPHOCYTES # BLD AUTO: 1.8 10E3/UL (ref 0.8–5.3)
LYMPHOCYTES NFR BLD AUTO: 26 %
MCH RBC QN AUTO: 30.6 PG (ref 26.5–33)
MCHC RBC AUTO-ENTMCNC: 33.7 G/DL (ref 31.5–36.5)
MCV RBC AUTO: 91 FL (ref 78–100)
MONOCYTES # BLD AUTO: 0.4 10E3/UL (ref 0–1.3)
MONOCYTES NFR BLD AUTO: 6 %
NEUTROPHILS # BLD AUTO: 4.5 10E3/UL (ref 1.6–8.3)
NEUTROPHILS NFR BLD AUTO: 64 %
NRBC # BLD AUTO: 0 10E3/UL
NRBC BLD AUTO-RTO: 0 /100
PLATELET # BLD AUTO: 259 10E3/UL (ref 150–450)
POTASSIUM SERPL-SCNC: 4.6 MMOL/L (ref 3.4–5.3)
RBC # BLD AUTO: 4.74 10E6/UL (ref 3.8–5.2)
SODIUM SERPL-SCNC: 140 MMOL/L (ref 135–145)
WBC # BLD AUTO: 6.9 10E3/UL (ref 4–11)

## 2025-06-11 PROCEDURE — 250N000012 HC RX MED GY IP 250 OP 636 PS 637: Performed by: STUDENT IN AN ORGANIZED HEALTH CARE EDUCATION/TRAINING PROGRAM

## 2025-06-11 PROCEDURE — 250N000011 HC RX IP 250 OP 636: Performed by: STUDENT IN AN ORGANIZED HEALTH CARE EDUCATION/TRAINING PROGRAM

## 2025-06-11 PROCEDURE — 250N000013 HC RX MED GY IP 250 OP 250 PS 637: Performed by: PHYSICIAN ASSISTANT

## 2025-06-11 PROCEDURE — 99232 SBSQ HOSP IP/OBS MODERATE 35: CPT | Performed by: HOSPITALIST

## 2025-06-11 PROCEDURE — 36415 COLL VENOUS BLD VENIPUNCTURE: CPT | Performed by: STUDENT IN AN ORGANIZED HEALTH CARE EDUCATION/TRAINING PROGRAM

## 2025-06-11 PROCEDURE — 120N000001 HC R&B MED SURG/OB

## 2025-06-11 PROCEDURE — 85025 COMPLETE CBC W/AUTO DIFF WBC: CPT | Performed by: PHYSICIAN ASSISTANT

## 2025-06-11 PROCEDURE — 80048 BASIC METABOLIC PNL TOTAL CA: CPT | Performed by: PHYSICIAN ASSISTANT

## 2025-06-11 PROCEDURE — 36415 COLL VENOUS BLD VENIPUNCTURE: CPT | Performed by: PHYSICIAN ASSISTANT

## 2025-06-11 PROCEDURE — 250N000013 HC RX MED GY IP 250 OP 250 PS 637

## 2025-06-11 RX ORDER — LORAZEPAM 2 MG/ML
1 INJECTION INTRAMUSCULAR
Status: COMPLETED | OUTPATIENT
Start: 2025-06-13 | End: 2025-06-13

## 2025-06-11 RX ORDER — LIDOCAINE HYDROCHLORIDE 10 MG/ML
30 INJECTION, SOLUTION EPIDURAL; INFILTRATION; INTRACAUDAL; PERINEURAL ONCE
Status: DISCONTINUED | OUTPATIENT
Start: 2025-06-11 | End: 2025-06-19 | Stop reason: HOSPADM

## 2025-06-11 RX ORDER — PREDNISONE 20 MG/1
60 TABLET ORAL DAILY
Status: DISCONTINUED | OUTPATIENT
Start: 2025-06-11 | End: 2025-06-11

## 2025-06-11 RX ORDER — MAGNESIUM CARB/ALUMINUM HYDROX 105-160MG
148 TABLET,CHEWABLE ORAL
Status: DISCONTINUED | OUTPATIENT
Start: 2025-06-11 | End: 2025-06-19 | Stop reason: HOSPADM

## 2025-06-11 RX ORDER — PREDNISONE 20 MG/1
60 TABLET ORAL DAILY
Status: COMPLETED | OUTPATIENT
Start: 2025-06-12 | End: 2025-06-16

## 2025-06-11 RX ORDER — MAGNESIUM SULFATE HEPTAHYDRATE 40 MG/ML
2 INJECTION, SOLUTION INTRAVENOUS ONCE
Status: COMPLETED | OUTPATIENT
Start: 2025-06-11 | End: 2025-06-11

## 2025-06-11 RX ADMIN — PREDNISONE 60 MG: 20 TABLET ORAL at 16:03

## 2025-06-11 RX ADMIN — POLYETHYLENE GLYCOL 3350 17 G: 17 POWDER, FOR SOLUTION ORAL at 21:47

## 2025-06-11 RX ADMIN — BUSPIRONE HYDROCHLORIDE 15 MG: 15 TABLET ORAL at 08:42

## 2025-06-11 RX ADMIN — ACETAMINOPHEN 650 MG: 325 TABLET, FILM COATED ORAL at 05:40

## 2025-06-11 RX ADMIN — CETIRIZINE HYDROCHLORIDE 10 MG: 10 TABLET, FILM COATED ORAL at 20:10

## 2025-06-11 RX ADMIN — APIXABAN 5 MG: 5 TABLET, FILM COATED ORAL at 08:42

## 2025-06-11 RX ADMIN — PREGABALIN 200 MG: 100 CAPSULE ORAL at 20:10

## 2025-06-11 RX ADMIN — MAGNESIUM SULFATE HEPTAHYDRATE 2 G: 40 INJECTION, SOLUTION INTRAVENOUS at 18:49

## 2025-06-11 RX ADMIN — NORETHINDRONE ACETATE 5 MG: 5 TABLET ORAL at 08:41

## 2025-06-11 RX ADMIN — POLYETHYLENE GLYCOL 3350 17 G: 17 POWDER, FOR SOLUTION ORAL at 16:03

## 2025-06-11 RX ADMIN — PREGABALIN 100 MG: 100 CAPSULE ORAL at 12:25

## 2025-06-11 RX ADMIN — PANTOPRAZOLE SODIUM 40 MG: 40 TABLET, DELAYED RELEASE ORAL at 08:42

## 2025-06-11 RX ADMIN — HYDROXYCHLOROQUINE SULFATE 200 MG: 200 TABLET, FILM COATED ORAL at 08:42

## 2025-06-11 RX ADMIN — SENNOSIDES AND DOCUSATE SODIUM 1 TABLET: 50; 8.6 TABLET ORAL at 08:42

## 2025-06-11 RX ADMIN — PREGABALIN 100 MG: 100 CAPSULE ORAL at 08:42

## 2025-06-11 RX ADMIN — CYCLOBENZAPRINE 5 MG: 5 TABLET, FILM COATED ORAL at 04:38

## 2025-06-11 RX ADMIN — AMITRIPTYLINE HYDROCHLORIDE 50 MG: 50 TABLET, FILM COATED ORAL at 21:47

## 2025-06-11 RX ADMIN — HYDROXYCHLOROQUINE SULFATE 200 MG: 200 TABLET, FILM COATED ORAL at 20:10

## 2025-06-11 RX ADMIN — POLYETHYLENE GLYCOL 3350 17 G: 17 POWDER, FOR SOLUTION ORAL at 08:42

## 2025-06-11 RX ADMIN — BUSPIRONE HYDROCHLORIDE 15 MG: 15 TABLET ORAL at 20:10

## 2025-06-11 RX ADMIN — ACETAMINOPHEN 650 MG: 325 TABLET, FILM COATED ORAL at 11:35

## 2025-06-11 RX ADMIN — SENNOSIDES AND DOCUSATE SODIUM 2 TABLET: 50; 8.6 TABLET ORAL at 20:10

## 2025-06-11 RX ADMIN — CETIRIZINE HYDROCHLORIDE 10 MG: 10 TABLET, FILM COATED ORAL at 08:42

## 2025-06-11 RX ADMIN — HYDROXYZINE HYDROCHLORIDE 25 MG: 25 TABLET, FILM COATED ORAL at 04:38

## 2025-06-11 ASSESSMENT — ACTIVITIES OF DAILY LIVING (ADL)
ADLS_ACUITY_SCORE: 64
ADLS_ACUITY_SCORE: 67
ADLS_ACUITY_SCORE: 67
ADLS_ACUITY_SCORE: 64
ADLS_ACUITY_SCORE: 67
ADLS_ACUITY_SCORE: 64
ADLS_ACUITY_SCORE: 67
ADLS_ACUITY_SCORE: 64
ADLS_ACUITY_SCORE: 67
ADLS_ACUITY_SCORE: 64
ADLS_ACUITY_SCORE: 67
ADLS_ACUITY_SCORE: 64
ADLS_ACUITY_SCORE: 64

## 2025-06-11 NOTE — PROGRESS NOTES
MD Notification    Notified Person: MD    Notified Person Name:Alma Vaughan DO      Notification Date/Time:06/11/2025    Notification Interaction:Sophy    Purpose of Notification: Pt is anxious and she doesn't want do the XR Lumbar Puncture Spinal Tap Diagnostic. Per pt nobody told her to have this and she is requesting MD explanation for this.Thank You!    Orders Received:Per MD It won't be until Friday at earliest. Neuro said they were coming to explain to her.    Comments: (Dr Gamble) came and explained to the pt.

## 2025-06-11 NOTE — PROGRESS NOTES
Shift Summary [id number]    Admitting Diagnosis: Chronic inflammatory demyelinating polyneuropathy (H) [G61.81]  Leg weakness, bilateral [R29.898]   Vitals:Baseline  Pain: PRN Tylenol  for headache  A&Ox4  Voiding:BR  Mobility :SBA  Tele:NSR  CMS:Intact, ex-Baseline neuropathy   Lung Sounds :Clear  GI:Pt had big BM  Dressing:NA  IV-Magnesium infusing 50ml/hr  Diet: Regular Diet      Other: Pt was mad when the magnesium wasn't on the floor.The writer explained that pharm called and when meds are here will give it to you, but pt was frustrated. Discharge is pending.Pt will have MRI with anesthesia tomorrow at 1530.

## 2025-06-11 NOTE — PROGRESS NOTES
[facility]    Medicine Progress Note - Hospitalist Service    Date of Admission:  6/10/2025    Assessment & Plan   Nevin Alvarado is a 34 year old female admitted on 6/10/2025. She initially presented 6/9/25 for worsening leg pain, weakness, and falls. Given her hx of chronic inflammatory demyelinating polyneuropathy, her case was discussed with Neurology in the ER and they recommended admission overnight so she could be evaluated by Neurology in person in the morning however due to an appointment 6/10, she ultimately requested discharge with strict return precautions. She presents again 6/10 for evaluation of lower extremity weakness, neuropathic pain as well as midline cervical, thoracic, and lumbar back pain.  She also reports severe headaches.       Worsening neuropathy with falls   Hx of chronic inflammatory demyelinating polyneuropathy: Pt follows with Dr. Chance of [facility], refer to detailed note from 5/15/25. She has an upcoming appointment 6/30 and appears to have MR brain, cervical, thoracic and lumbar spine, EMG of the upper and lower extremities ordered at that time. She was also referred to medical genetics.   *CIDP diagnosed in 2015, characterized by the lower extremities.  She was treated with IVIG between 2015 and 2020, with weekly to biweekly IVIG infusions.  At that point, she discontinued IVIG because of lack of efficacy, but has noted that symptoms are worsening since then.  In 2024 she had worsening in her symptoms, presented to the [facility] ER, noted to have cauda equina thickening, and worsening sensory motor peripheral neuropathy. She has been treated with plasma exchange x 7 sessions, with some improvement in strength following plasma exchange.   *Per Neuro note from 5/15/25, pt typically experiences generalized weakness in her legs, L>R; has a foot drop. Noted tremors in her hands, numbness and tingling in her arms, with sensitivity to pressure causing  "numbness from her arms to her feet. She also has numbness in her left foot, more so than the right, and weakness in both legs, worse on the left.  *Labs ordered per Dr. Chance 5/15: Vit D, B12 WNL, paraneoplastic autoantibody labs negative, SSA [place] antibody IgG negative, SSB [place] antibody IgG negative. Glutamic acid decarboxylase antibody negative. Methylmalonic acid 0.14. CRP mildly elevated at 7.6, axonal, autoimmune/paraneoplastic panel negative. Peripheral nervous system demyelinating neuropathy, autoimmune eval negative. 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 Pt reports headaches have been worsening, described as a pressure in her head, \"worst headache of my life\" that will linger for 24 hours, dissipate, then come back after 12 hours with some associated blurred vision.   *6/1 ED Visit, ativan 1mg, mag sulfate 2g, 1L bolus but did not alleviate her pain.  *6/10 toradol trialed, no improvement.  - Neurology following, appreciate input regarding best treatment  - imitrex available PRN.  - PTA lyrica increased with 100mg dose added midday per neurology rec  - 2g IV mag and prednisone 60mg daily for 5 days.     Constipation, improved  Hx of thrombosed hemorrhoids: Hx of this, reports no BM X1 week, not passing flatus. Some abdominal pain in lower abdomen radiating to LUQ and epigastric region. Reports that she has trialed colace, miralax, " suppositories, manual disimpaction outpatient with no relief. Not on narcotics. ? If related to mounjaro. Recently had a colonoscopy with [facility] which was reported as normal, but was referred to Colorectal Surgery for thrombosed hemorrhoids. No intervention performed as pt is on Eliquis. She has subsequently developed a few more hemorrhoids.   *6/10 KUB moderate stool burden  -Colorectal Surgery consulted for significant constipation, appreciate recommendations  -Senokot BID, Miralax TID, PRN suppository   -*good BM 6/11 midday     Atypical chest pain: Localized to center of the chest, described as a burning. No radiation. Not aggravated with activity. No SOB. Vitals stable. No cardiac hx. ? GERD.   *EKG: sinus  - PRN maalox     Hx of PE (4/2024 and 2019)  - hold PTA eliquis while awaiting LP/possible port placement     Obesity: PTA Moujaro has been on hold in the setting of recent outpatient procedures (colonoscopy, endoscopy, knee replacement), pt tried restarting, but became acutely ill with N/V, thus has remained off for now and will work with her outpatient weight management team to discuss resumption.      Fibromyalgia: Follows with [facility]. Has been put on Plaquenil 200 mg BID for unclear rheumatologic process. Defer management to them. Will continue for now.      ZOHRA: Does not tolerate CPAP      Generalized anxiety disorder   Borderline personality disorder   MDD   PTSD  Hx of self injurious behavior: Noted per Psychiatry note 4/2024. Reports stable mental health.   - Resume PTA meds including Buspar, Amitriptyline, PRN Klonopin and Atarax      Frequent utilization of healthcare system: Noted care plan.          Diet: Combination Diet Regular Diet Adult    DVT Prophylaxis: Pneumatic Compression Devices  Hazel Catheter: Not present  Lines: None     Cardiac Monitoring: None  Code Status: Full Code      Clinically Significant Risk Factors                              # Morbid Obesity: Estimated body mass  "index is 41.34 kg/m  as calculated from the following:    Height as of 6/1/25: 1.575 m (5' 2\").    Weight as of 6/9/25: 102.5 kg (226 lb)., PRESENT ON ADMISSION       # Financial/Environmental Concerns:           Social Drivers of Health    Depression: At risk (3/12/2025)    Received from [facility]    PHQ-2     PHQ-2 TOTAL SCORE: 3   Financial Resource Strain: Medium Risk (3/2/2025)    Received from [facility]    Financial Resource Strain     Difficulty of Paying Living Expenses: 2     Difficulty of Paying Living Expenses: 1   Transportation Needs: Unmet Transportation Needs (3/2/2025)    Received from [facility]    Transportation Needs     Does lack of transportation keep you from medical appointments?: 1     Does lack of transportation keep you from work, meetings or getting things that you need?: 2   Physical Activity: Insufficiently Active (11/13/2024)    Received from [facility]    Exercise Vital Sign     Days of Exercise per Week: 3 days     Minutes of Exercise per Session: 10 min          Disposition Plan     Medically Ready for Discharge: Anticipated in 2-4 Days  Pending LP/MRIs, improvement of headache           Alma Vaughan, DO  Hospitalist Service  [facility]  Securely message with [employer] (more info)  Text page via [employer] Paging/Directory   ______________________________________________________________________    Interval History   Patient seen and examined. She reports ongoing left sided headache, untouched by imitrex or toradol given on 6/11. No light or sound sensitivity. Only small BMs overnight and earlier today, had a larger BM prior to being given enema midday. Hopeful for ongoing relief of constipation, may be able to wean back meds.  Discussed plan with neurology.    Physical Exam   Vital Signs: Temp: 97.8  F (36.6  C) Temp src: Oral BP: (!) 141/79 Pulse: 90   Resp: " 16 SpO2: 98 % O2 Device: None (Room air)    Weight: 0 lbs 0 oz    Constitutional: Awake, alert, cooperative, no apparent distress, sitting up in chair  Respiratory: Clear to auscultation bilaterally, no crackles or wheezing  Cardiovascular: Regular rate and rhythm, normal S1 and S2, and no murmur noted  GI: Normal bowel sounds, soft, non-distended, non-tender  Skin/Integumen: No rashes, no cyanosis, no edema  Other:      Medical Decision Making       45 MINUTES SPENT BY ME on the date of service doing chart review, history, exam, documentation & further activities per the note.      Data     I have personally reviewed the following data over the past 24 hrs:    6.9  \   14.5   / 259     140 106 13.1 /  138 (H)   4.6 20 (L) 0.64 \       Imaging results reviewed over the past 24 hrs:   No results found for this or any previous visit (from the past 24 hours).

## 2025-06-11 NOTE — PROGRESS NOTES
Patient stated she does not wear NOC CPAP at home. She said she has dicussed this with her doctor and she is needing a new sleep study. However, insurance will not pay for it.

## 2025-06-11 NOTE — PROGRESS NOTES
Anesthesia called and confirmed that they are available tomorrow 6/12/2025 at 1530.The writer called Surgery scheduling and scheduled upcoming pavan.

## 2025-06-11 NOTE — PROGRESS NOTES
COLON & RECTAL SURGERY  PROGRESS NOTE    June 11, 2025    SUBJECTIVE:  3 BMs recorded yesterday, pt reports very small firm stools. Minimal stool out with enema this AM. Still feeling constipated.     OBJECTIVE:  Temp:  [97  F (36.1  C)-98.7  F (37.1  C)] 98.7  F (37.1  C)  Pulse:  [] 82  Resp:  [16] 16  BP: (135-161)/() 135/84  SpO2:  [98 %] 98 %    Intake/Output Summary (Last 24 hours) at 6/11/2025 0727  Last data filed at 6/10/2025 1630  Gross per 24 hour   Intake --   Output 0 ml   Net 0 ml       GENERAL:  Awake, alert, no acute distress  HEAD: Normocephalic atraumatic  SCLERA: Anicteric  EXTREMITIES: Warm and well perfused      LABS:  Lab Results   Component Value Date    WBC 9.4 06/09/2025    WBC 10.7 03/13/2021     Lab Results   Component Value Date    HGB 14.2 06/09/2025    HGB 10.9 03/13/2021     Lab Results   Component Value Date    HCT 41.0 06/09/2025    HCT 34.7 03/13/2021     Lab Results   Component Value Date     06/09/2025     03/13/2021     Last Basic Metabolic Panel:  Lab Results   Component Value Date     06/09/2025     03/13/2021      Lab Results   Component Value Date    POTASSIUM 3.6 06/09/2025    POTASSIUM 4.1 07/08/2023    POTASSIUM 3.9 03/13/2021     Lab Results   Component Value Date    CHLORIDE 104 06/09/2025    CHLORIDE 103 07/08/2023    CHLORIDE 108 03/13/2021     Lab Results   Component Value Date    KELBY 9.6 06/09/2025    KELBY 9.0 03/13/2021     Lab Results   Component Value Date    CO2 25 06/09/2025    CO2 27 07/08/2023    CO2 26 03/13/2021     Lab Results   Component Value Date    BUN 10.7 06/09/2025    BUN 19 07/08/2023    BUN 10 03/13/2021     Lab Results   Component Value Date    CR 0.62 06/09/2025    CR 0.62 03/13/2021     Lab Results   Component Value Date    GLC 88 06/09/2025     04/02/2024     07/08/2023    GLC 97 03/13/2021       ASSESSMENT/PLAN: 33 yo F with constipation with no BM x 1 week, also has a thrombosed external  hemorrhoid    - Continue PO bowel regimen  - Will try pink lady enema today  - Conservative mgmt for hemorrhoids      For questions/paging, please contact the CRS office at [phone].    Yaw Lizama PA-C  Physician Assistant    [facility]  [address]  [place] [address]  T: [phone]  F: [phone]

## 2025-06-11 NOTE — PLAN OF CARE
Goal Outcome Evaluation:    Shift Summary 1900-0730    Admitting Diagnosis: Chronic inflammatory demyelinating polyneuropathy (H) [G61.81]  Leg weakness, bilateral [R29.898]   Vitals VSS on RA   Pain 7/10. Taking PRN medications for headache  A&Ox4  Voiding WNL - PRV 12  Mobility SBA w/ Walker  Tele NSR  CMS baseline numbness and tingling   Neuros intact  GI constipated, soap [place] enema done @ 0530 with minimal results  Diet regular. Has not eaten anything since dinner last night. Only drank water during the night.        Plan: MRI, needs to be under anesthesia. Called anesthesia in they evening said to call back in the morning to coordinate a time. Called anesthesia @ 0730, they said they will call MRI and call back to unit. Requested Pt not eat breakfast until they have a plan. Pt aware and will hold off on eating. On coming nurse aware of plan. MRI checklist done. Pt hoping to get MRI done today.

## 2025-06-11 NOTE — PROGRESS NOTES
"Neurology follow-up: 06/11/25    Patient admitted with worsening leg weakness. Hx of CIDP, concern for a flare. Has headaches as well.         Interval history and investigations   Discussed with Dr. Chance who last saw patient in 5/2025. Agree with LP to support flare and need for IVIG. MRIs scheduled for tomorrow. Continues to endorse a headache behind the eyes and some sharp pain on the left occipital location.  Has tried muscle relaxers before for TMJ and headaches with no benefit. Willing to try magnesium and steroids for headache.     Prior studies   Vitamin B12:         Recent Labs   Lab Test 05/15/25  1039 04/07/24  1823 02/11/22  0844   B12 1,033 <150* 203      CSF: elevated protein, no cells, increased albumin index, normal IgG index, raised albumin, negative cytology, protein 57, normal glucose, negative neuromyelitis optica antibody, negative meningitic profile, negative VDRL (03/2024)   TSH: 4.54 (03/2024)  JULIA: negative (03/2024)  Ganglioside antibody: negative (03/2024)   Negative O bands in 8/2024. OP 25mmHg      DIAGNOSTIC  EMG: axon loss, sensory motor peripheral neuropathy (07/2023)    MRI 12/2024  CERVICAL SPINE MRI:   1.  Mild spinal canal and right neural foraminal stenosis at C5-C6.     THORACIC SPINE MRI:   1.  Minor degenerative changes without significant spinal canal stenosis.   2.  Multilevel facet hypertrophy with scattered moderate neural foraminal stenoses, greatest at T7-T8 and T10-T11.     LUMBAR SPINE MRI:   1.  Transitional lumbosacral anatomy.   2.  Persistent enlargement of the bilateral lumbosacral nerve roots. This is similar prior and consistent with known history of CIDP.        Examination   Vital signs:  Temp: 97.8  F (36.6  C) Temp src: Oral BP: (!) 141/79 Pulse: 90   Resp: 16 SpO2: 98 % O2 Device: None (Room air)        Estimated body mass index is 41.34 kg/m  as calculated from the following:    Height as of 6/1/25: 1.575 m (5' 2\").    Weight as of 6/9/25: 102.5 kg (226 " lb).        Neuro Exam:  Mental Status Exam: Alert and oriented. Language and speech intact. Fund of knowledge normal. Appears anxious.   Cranial Nerves Extraocular movements intact, no gaze deviation.    Motor: Not tested today but prior exam showed  lower extremities 4/5 strength of hip flexion, knee flexion, knee extension.  Dorsiflexion of the left lower extremity 3/5.  Plantarflexion 4/5 bilaterally.   Sensory: Prior exam showed: Vibration diminished in the right great toe, absent in the left great toe.  Minimally diminished in the ankles bilaterally..  Gait: Deferred    ASSESSMENT     Hx of CIDP, worsening leg strength, possible flare.  LP and MRI to confirm. If positive and consistent, will move forward with IVIG. Will need a port for IVIG (poor IV access) and can get it Friday as anticoagulation needs to be held for this as well.   Headaches: tension in description and could be cervicogenic, no migrainous quality. Has some occipital neuralgia, and hx of TMJ but reports flexeril this hospital and ketorolac has not helped. Reports pain clinic has seen her and hasn't been helpful, would like to avoid opioids. Increase in lyrica has not helped with headache.        RECOMMENDATIONS   MRI brain, C, T, L spine wwo con ordered with anaesthesia tomorrow.   Recommend PT for strength and cervicogenic headaches   LP ordered, will hold eliquis, will also obtain opening pressure. Plan for Friday AM. Ativan before LP.   Continue lyrica 100/100/200mg a day and amitriptyline.   2g IV mag and prednisone 60mg for 5 days ordered     Follow-up:Will see on tomorrow morning     Continue to provide reorientation, reassurance, and stimulation during the day with lights on, blinds open, and the television on alternating with dark, quiet environment at night.  Interruptions during the night should be limited as much as possible.      Thank you for allowing me to participate in Ms. Alvarado's care.       Nevin Gamble MD    Neurologist, [facility]   June 11, 2025 2:25 PM      A total time of 36  minutes was spent on the care of this patient on the date of the visit, outside of time spent performing any procedures. Please excuse any typographical errors, as this note was generated using a Voice Recognition Software.

## 2025-06-11 NOTE — PROGRESS NOTES
"The patient became anxious after being informed that she would undergo an XR Lumbar Puncture Spinal Tap Diagnostic . She was screaming, stating, \"I haven't consented to this.\" The writer and charge nurse explained the procedure to the patient, who then requested to call MD for explanation.MD notified.          "

## 2025-06-12 ENCOUNTER — APPOINTMENT (OUTPATIENT)
Dept: MRI IMAGING | Facility: CLINIC | Age: 35
DRG: 074 | End: 2025-06-12
Attending: STUDENT IN AN ORGANIZED HEALTH CARE EDUCATION/TRAINING PROGRAM
Payer: COMMERCIAL

## 2025-06-12 ENCOUNTER — ANESTHESIA (OUTPATIENT)
Dept: SURGERY | Facility: CLINIC | Age: 35
End: 2025-06-12
Payer: COMMERCIAL

## 2025-06-12 ENCOUNTER — DOCUMENTATION ONLY (OUTPATIENT)
Dept: OTHER | Facility: CLINIC | Age: 35
End: 2025-06-12
Payer: COMMERCIAL

## 2025-06-12 VITALS
TEMPERATURE: 98.4 F | OXYGEN SATURATION: 99 % | SYSTOLIC BLOOD PRESSURE: 158 MMHG | DIASTOLIC BLOOD PRESSURE: 90 MMHG | HEART RATE: 99 BPM | RESPIRATION RATE: 18 BRPM

## 2025-06-12 LAB
GLUCOSE BLDC GLUCOMTR-MCNC: 153 MG/DL (ref 70–99)
GLUCOSE BLDC GLUCOMTR-MCNC: 215 MG/DL (ref 70–99)
HGB BLD-MCNC: 13.6 G/DL (ref 11.7–15.7)
MCV RBC AUTO: 90 FL (ref 78–100)

## 2025-06-12 PROCEDURE — 72158 MRI LUMBAR SPINE W/O & W/DYE: CPT

## 2025-06-12 PROCEDURE — 36415 COLL VENOUS BLD VENIPUNCTURE: CPT | Performed by: INTERNAL MEDICINE

## 2025-06-12 PROCEDURE — 370N000017 HC ANESTHESIA TECHNICAL FEE, PER MIN

## 2025-06-12 PROCEDURE — 250N000025 HC SEVOFLURANE, PER MIN

## 2025-06-12 PROCEDURE — 120N000001 HC R&B MED SURG/OB

## 2025-06-12 PROCEDURE — 250N000012 HC RX MED GY IP 250 OP 636 PS 637: Performed by: STUDENT IN AN ORGANIZED HEALTH CARE EDUCATION/TRAINING PROGRAM

## 2025-06-12 PROCEDURE — 250N000009 HC RX 250

## 2025-06-12 PROCEDURE — 72157 MRI CHEST SPINE W/O & W/DYE: CPT

## 2025-06-12 PROCEDURE — 250N000013 HC RX MED GY IP 250 OP 250 PS 637

## 2025-06-12 PROCEDURE — 70553 MRI BRAIN STEM W/O & W/DYE: CPT

## 2025-06-12 PROCEDURE — 250N000013 HC RX MED GY IP 250 OP 250 PS 637: Performed by: PHYSICIAN ASSISTANT

## 2025-06-12 PROCEDURE — 250N000011 HC RX IP 250 OP 636

## 2025-06-12 PROCEDURE — 99233 SBSQ HOSP IP/OBS HIGH 50: CPT | Performed by: HOSPITALIST

## 2025-06-12 PROCEDURE — 258N000003 HC RX IP 258 OP 636: Performed by: ANESTHESIOLOGY

## 2025-06-12 PROCEDURE — 85018 HEMOGLOBIN: CPT | Performed by: INTERNAL MEDICINE

## 2025-06-12 PROCEDURE — 250N000013 HC RX MED GY IP 250 OP 250 PS 637: Performed by: HOSPITALIST

## 2025-06-12 PROCEDURE — 250N000013 HC RX MED GY IP 250 OP 250 PS 637: Performed by: INTERNAL MEDICINE

## 2025-06-12 PROCEDURE — 72156 MRI NECK SPINE W/O & W/DYE: CPT

## 2025-06-12 PROCEDURE — 250N000011 HC RX IP 250 OP 636: Performed by: ANESTHESIOLOGY

## 2025-06-12 PROCEDURE — 999N000141 HC STATISTIC PRE-PROCEDURE NURSING ASSESSMENT

## 2025-06-12 PROCEDURE — 255N000002 HC RX 255 OP 636: Performed by: STUDENT IN AN ORGANIZED HEALTH CARE EDUCATION/TRAINING PROGRAM

## 2025-06-12 PROCEDURE — 710N000009 HC RECOVERY PHASE 1, LEVEL 1, PER MIN

## 2025-06-12 PROCEDURE — 250N000011 HC RX IP 250 OP 636: Performed by: PHYSICIAN ASSISTANT

## 2025-06-12 PROCEDURE — A9585 GADOBUTROL INJECTION: HCPCS | Performed by: STUDENT IN AN ORGANIZED HEALTH CARE EDUCATION/TRAINING PROGRAM

## 2025-06-12 PROCEDURE — 258N000003 HC RX IP 258 OP 636: Performed by: INTERNAL MEDICINE

## 2025-06-12 RX ORDER — PROPOFOL 10 MG/ML
INJECTION, EMULSION INTRAVENOUS CONTINUOUS PRN
Status: DISCONTINUED | OUTPATIENT
Start: 2025-06-12 | End: 2025-06-12

## 2025-06-12 RX ORDER — GADOBUTROL 604.72 MG/ML
10 INJECTION INTRAVENOUS ONCE
Status: COMPLETED | OUTPATIENT
Start: 2025-06-12 | End: 2025-06-12

## 2025-06-12 RX ORDER — LIDOCAINE HYDROCHLORIDE 20 MG/ML
INJECTION, SOLUTION INFILTRATION; PERINEURAL PRN
Status: DISCONTINUED | OUTPATIENT
Start: 2025-06-12 | End: 2025-06-12

## 2025-06-12 RX ORDER — DEXAMETHASONE SODIUM PHOSPHATE 4 MG/ML
INJECTION, SOLUTION INTRA-ARTICULAR; INTRALESIONAL; INTRAMUSCULAR; INTRAVENOUS; SOFT TISSUE PRN
Status: DISCONTINUED | OUTPATIENT
Start: 2025-06-12 | End: 2025-06-12

## 2025-06-12 RX ORDER — PHENYLEPHRINE HCL IN 0.9% NACL 50MG/250ML
PLASTIC BAG, INJECTION (ML) INTRAVENOUS CONTINUOUS PRN
Status: DISCONTINUED | OUTPATIENT
Start: 2025-06-12 | End: 2025-06-12

## 2025-06-12 RX ORDER — AMMONIUM LACTATE 12 G/100G
LOTION TOPICAL 2 TIMES DAILY
Status: DISCONTINUED | OUTPATIENT
Start: 2025-06-12 | End: 2025-06-19 | Stop reason: HOSPADM

## 2025-06-12 RX ORDER — SODIUM CHLORIDE, SODIUM LACTATE, POTASSIUM CHLORIDE, CALCIUM CHLORIDE 600; 310; 30; 20 MG/100ML; MG/100ML; MG/100ML; MG/100ML
INJECTION, SOLUTION INTRAVENOUS CONTINUOUS
Status: DISCONTINUED | OUTPATIENT
Start: 2025-06-12 | End: 2025-06-12 | Stop reason: HOSPADM

## 2025-06-12 RX ORDER — PROPOFOL 10 MG/ML
INJECTION, EMULSION INTRAVENOUS PRN
Status: DISCONTINUED | OUTPATIENT
Start: 2025-06-12 | End: 2025-06-12

## 2025-06-12 RX ORDER — ONDANSETRON 2 MG/ML
INJECTION INTRAMUSCULAR; INTRAVENOUS PRN
Status: DISCONTINUED | OUTPATIENT
Start: 2025-06-12 | End: 2025-06-12

## 2025-06-12 RX ORDER — MECLIZINE HYDROCHLORIDE 25 MG/1
25 TABLET ORAL ONCE
Status: COMPLETED | OUTPATIENT
Start: 2025-06-12 | End: 2025-06-12

## 2025-06-12 RX ORDER — DEXMEDETOMIDINE HYDROCHLORIDE 4 UG/ML
INJECTION, SOLUTION INTRAVENOUS PRN
Status: DISCONTINUED | OUTPATIENT
Start: 2025-06-12 | End: 2025-06-12

## 2025-06-12 RX ORDER — HEPARIN SODIUM 5000 [USP'U]/.5ML
5000 INJECTION, SOLUTION INTRAVENOUS; SUBCUTANEOUS EVERY 8 HOURS
Status: DISPENSED | OUTPATIENT
Start: 2025-06-12 | End: 2025-06-13

## 2025-06-12 RX ADMIN — HYDROXYCHLOROQUINE SULFATE 200 MG: 200 TABLET, FILM COATED ORAL at 21:19

## 2025-06-12 RX ADMIN — PROPOFOL 200 MG: 10 INJECTION, EMULSION INTRAVENOUS at 16:25

## 2025-06-12 RX ADMIN — Medication 0.4 MCG/KG/MIN: at 16:30

## 2025-06-12 RX ADMIN — BUSPIRONE HYDROCHLORIDE 15 MG: 15 TABLET ORAL at 08:13

## 2025-06-12 RX ADMIN — MIDAZOLAM 2 MG: 1 INJECTION INTRAMUSCULAR; INTRAVENOUS at 15:53

## 2025-06-12 RX ADMIN — CETIRIZINE HYDROCHLORIDE 10 MG: 10 TABLET, FILM COATED ORAL at 08:14

## 2025-06-12 RX ADMIN — PANTOPRAZOLE SODIUM 40 MG: 40 TABLET, DELAYED RELEASE ORAL at 08:14

## 2025-06-12 RX ADMIN — NORETHINDRONE ACETATE 5 MG: 5 TABLET ORAL at 08:14

## 2025-06-12 RX ADMIN — SODIUM CHLORIDE, SODIUM LACTATE, POTASSIUM CHLORIDE, AND CALCIUM CHLORIDE: .6; .31; .03; .02 INJECTION, SOLUTION INTRAVENOUS at 15:53

## 2025-06-12 RX ADMIN — ROCURONIUM BROMIDE 10 MG: 50 INJECTION, SOLUTION INTRAVENOUS at 17:23

## 2025-06-12 RX ADMIN — LIDOCAINE HYDROCHLORIDE 100 MG: 20 INJECTION, SOLUTION INFILTRATION; PERINEURAL at 16:25

## 2025-06-12 RX ADMIN — DEXAMETHASONE SODIUM PHOSPHATE 4 MG: 4 INJECTION, SOLUTION INTRA-ARTICULAR; INTRALESIONAL; INTRAMUSCULAR; INTRAVENOUS; SOFT TISSUE at 16:25

## 2025-06-12 RX ADMIN — PREGABALIN 100 MG: 100 CAPSULE ORAL at 11:44

## 2025-06-12 RX ADMIN — SENNOSIDES AND DOCUSATE SODIUM 2 TABLET: 50; 8.6 TABLET ORAL at 08:13

## 2025-06-12 RX ADMIN — Medication: at 21:36

## 2025-06-12 RX ADMIN — POLYETHYLENE GLYCOL 3350 17 G: 17 POWDER, FOR SOLUTION ORAL at 21:20

## 2025-06-12 RX ADMIN — AMITRIPTYLINE HYDROCHLORIDE 50 MG: 50 TABLET, FILM COATED ORAL at 21:18

## 2025-06-12 RX ADMIN — SENNOSIDES AND DOCUSATE SODIUM 2 TABLET: 50; 8.6 TABLET ORAL at 21:18

## 2025-06-12 RX ADMIN — BUSPIRONE HYDROCHLORIDE 15 MG: 15 TABLET ORAL at 21:19

## 2025-06-12 RX ADMIN — POLYETHYLENE GLYCOL 3350 17 G: 17 POWDER, FOR SOLUTION ORAL at 08:29

## 2025-06-12 RX ADMIN — ONDANSETRON 4 MG: 4 TABLET, ORALLY DISINTEGRATING ORAL at 01:33

## 2025-06-12 RX ADMIN — SODIUM CHLORIDE 500 ML: 0.9 INJECTION, SOLUTION INTRAVENOUS at 02:54

## 2025-06-12 RX ADMIN — GADOBUTROL 10 ML: 604.72 INJECTION INTRAVENOUS at 17:44

## 2025-06-12 RX ADMIN — CETIRIZINE HYDROCHLORIDE 10 MG: 10 TABLET, FILM COATED ORAL at 21:19

## 2025-06-12 RX ADMIN — PREGABALIN 200 MG: 100 CAPSULE ORAL at 21:19

## 2025-06-12 RX ADMIN — MECLIZINE HYDROCHLORIDE 25 MG: 25 TABLET ORAL at 03:34

## 2025-06-12 RX ADMIN — DEXMEDETOMIDINE HYDROCHLORIDE 10 MCG: 4 INJECTION, SOLUTION INTRAVENOUS at 16:15

## 2025-06-12 RX ADMIN — ROCURONIUM BROMIDE 40 MG: 50 INJECTION, SOLUTION INTRAVENOUS at 16:25

## 2025-06-12 RX ADMIN — PROPOFOL 30 MCG/KG/MIN: 10 INJECTION, EMULSION INTRAVENOUS at 16:30

## 2025-06-12 RX ADMIN — PREGABALIN 100 MG: 100 CAPSULE ORAL at 08:14

## 2025-06-12 RX ADMIN — ONDANSETRON 4 MG: 2 INJECTION INTRAMUSCULAR; INTRAVENOUS at 18:10

## 2025-06-12 RX ADMIN — Medication 200 MG: at 18:08

## 2025-06-12 RX ADMIN — ONDANSETRON 4 MG: 4 TABLET, ORALLY DISINTEGRATING ORAL at 07:36

## 2025-06-12 RX ADMIN — PREDNISONE 60 MG: 20 TABLET ORAL at 08:13

## 2025-06-12 RX ADMIN — HYDROXYCHLOROQUINE SULFATE 200 MG: 200 TABLET, FILM COATED ORAL at 08:14

## 2025-06-12 ASSESSMENT — ACTIVITIES OF DAILY LIVING (ADL)
ADLS_ACUITY_SCORE: 64
ADLS_ACUITY_SCORE: 64
DEPENDENT_IADLS:: CLEANING;COOKING;LAUNDRY;SHOPPING;MEAL PREPARATION;MONEY MANAGEMENT;TRANSPORTATION
ADLS_ACUITY_SCORE: 64
ADLS_ACUITY_SCORE: 65
ADLS_ACUITY_SCORE: 65
ADLS_ACUITY_SCORE: 64
ADLS_ACUITY_SCORE: 65
ADLS_ACUITY_SCORE: 65
ADLS_ACUITY_SCORE: 64
ADLS_ACUITY_SCORE: 65
ADLS_ACUITY_SCORE: 64
ADLS_ACUITY_SCORE: 65
ADLS_ACUITY_SCORE: 64
ADLS_ACUITY_SCORE: 65
ADLS_ACUITY_SCORE: 64
ADLS_ACUITY_SCORE: 65
ADLS_ACUITY_SCORE: 65
ADLS_ACUITY_SCORE: 64
ADLS_ACUITY_SCORE: 64

## 2025-06-12 NOTE — PROGRESS NOTES
Neurology follow-up: 06/11/25    Patient admitted with worsening leg weakness. Hx of CIDP, concern for a flare. Has headaches as well. During admission she has received tordol, flexeril, imitrex, tylenol without improvement. Magnsium and prednisone started for patient yesterday.         Interval history and investigations   Per nursing, overnight she had some lightheadedness when at the toliet. Orthostatics negative and tele with NSR. Felt that her eyes were shaking but nursing noted no neuro exam change and no nystagmus. Hospitalist ordered IV fluid and meclizine. Dizziness was improving a few hours later.  Upon interview with the patient she reports that her dizziness improved after fluids and meclizine.  Described as a lightheadedness that happened after she sat on the toilet.  Notes ear pressure as well as some sinus pressure today.  Has seen ENT in the past but not recently.  Denies any shortness of breath, weakness that is worsened, no new numbness, or chest pain.  Denies any double vision, difficulty swallowing, facial weakness.  Feels her headache is little bit improved today after getting prednisone and IV magnesium.  PT will hopefully see her today.     Prior studies   Vitamin B12:         Recent Labs   Lab Test 05/15/25  1039 04/07/24  1823 02/11/22  0844   B12 1,033 <150* 203      CSF: elevated protein, no cells, increased albumin index, normal IgG index, raised albumin, negative cytology, protein 57, normal glucose, negative neuromyelitis optica antibody, negative meningitic profile, negative VDRL (03/2024)   TSH: 4.54 (03/2024)  JULIA: negative (03/2024)  Ganglioside antibody: negative (03/2024)   Negative O bands in 8/2024. OP 25mmHg      DIAGNOSTIC  EMG: axon loss, sensory motor peripheral neuropathy (07/2023)    MRI 12/2024  CERVICAL SPINE MRI:   1.  Mild spinal canal and right neural foraminal stenosis at C5-C6.     THORACIC SPINE MRI:   1.  Minor degenerative changes without significant spinal canal  "stenosis.   2.  Multilevel facet hypertrophy with scattered moderate neural foraminal stenoses, greatest at T7-T8 and T10-T11.     LUMBAR SPINE MRI:   1.  Transitional lumbosacral anatomy.   2.  Persistent enlargement of the bilateral lumbosacral nerve roots. This is similar prior and consistent with known history of CIDP.        Examination   Vital signs:  Temp: 97.8  F (36.6  C) Temp src: Oral BP: (!) 143/80 Pulse: 85   Resp: 18 SpO2: 97 % O2 Device: None (Room air)        Estimated body mass index is 41.34 kg/m  as calculated from the following:    Height as of 6/1/25: 1.575 m (5' 2\").    Weight as of 6/9/25: 102.5 kg (226 lb).    Neuro Exam:  Mental Status Exam: Alert and oriented. Language and speech intact. Fund of knowledge normal. Appears anxious.   Cranial Nerves Extraocular movements intact, no gaze deviation or nystagmus.  Facial movement intact with no weakness.  Intact sensation to light touch in the face.  Intact visual fields to confrontational testing.  Motor: Strength in the upper extremities.  Lower extremities 4/5 strength of hip flexion, knee flexion, knee extension.  Dorsiflexion of the left lower extremity 3/5.  Plantarflexion 5/5 bilaterally.   Cerebellum: Finger-nose-finger without any ataxia  Sensory: Prior exam showed: Vibration diminished in the right great toe, absent in the left great toe.  Minimally diminished in the ankles bilaterally..  Gait: Deferred    ASSESSMENT     Hx of CIDP, worsening leg strength, possible flare.  LP and MRI to confirm. If positive and consistent, will move forward with IVIG. Will need a port for IVIG (poor IV access) and can get it Friday as anticoagulation needs to be held for this as well.   Headaches: tension in description and could be cervicogenic, no migrainous quality. Has some occipital neuralgia, and hx of TMJ but reports flexeril this hospital and ketorolac has not helped. Reports pain clinic has seen her and hasn't been helpful, would like to avoid " opioids. Increase in lyrica has not helped with headache. Was started on prednisone yesterday and given 2g of IV mag with some improvement.        RECOMMENDATIONS   MRI brain, C, T, L spine wwo con ordered with anaesthesia today.   Recommend PT for strength and cervicogenic headaches   LP ordered, will hold eliquis, will also obtain opening pressure. Plan for Friday AM. Ativan before LP. Patient should have other DVT prophy given her reduced mobility while holding eliquis.   Continue lyrica 100/100/200mg a day and amitriptyline 50qhs.   2g IV mag  yesterday, this could be repeated if headaches worsened. Started on prednisone 60mg for 5 days yesterday. Will continue.      Follow-up: Dr. Zabala will see patient tomorrow.     Continue to provide reorientation, reassurance, and stimulation during the day with lights on, blinds open, and the television on alternating with dark, quiet environment at night.  Interruptions during the night should be limited as much as possible.      Thank you for allowing me to participate in Ms. Alvarado's care.       Nevin Gamble MD   Neurologist, [facility]   June 11, 2025 2:25 PM      A total time of  40 minutes was spent on the care of this patient on the date of the visit, outside of time spent performing any procedures. Please excuse any typographical errors, as this note was generated using a Voice Recognition Software.

## 2025-06-12 NOTE — ANESTHESIA PREPROCEDURE EVALUATION
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    POTASSIUM 4.6 06/11/2025    POTASSIUM 3.6 06/09/2025    CHLORIDE 106 06/11/2025    CHLORIDE 104 06/09/2025    CO2 20 (L) 06/11/2025    CO2 25 06/09/2025    BUN 13.1 06/11/2025    BUN 10.7 06/09/2025    CR 0.64 06/11/2025    CR 0.62  "06/09/2025     (H) 06/12/2025     (H) 06/11/2025     COAGS:   Lab Results   Component Value Date    PTT 31 04/08/2024    INR 1.36 (H) 08/23/2024    FIBR 298 04/08/2024     POC:   Lab Results   Component Value Date     (H) 01/10/2021    HCG Negative 11/11/2024    HCGS Negative 06/01/2025     HEPATIC:   Lab Results   Component Value Date    ALBUMIN 4.6 05/20/2025    PROTTOTAL 7.3 05/20/2025    ALT 41 05/20/2025    AST 17 05/20/2025    ALKPHOS 76 05/20/2025    BILITOTAL 0.2 05/20/2025     OTHER:   Lab Results   Component Value Date    LACT 1.2 01/14/2025    KELBY 9.0 06/11/2025    PHOS 2.7 04/04/2024    MAG 2.1 11/11/2024    LIPASE 31 05/20/2025    AMYLASE 29 02/08/2022    TSH 4.54 (H) 10/12/2024    T4 1.38 10/12/2024    CRP 1.3 (H) 02/18/2023    SED >140 (H) 06/10/2025       Anesthesia Plan    ASA Status:  3, emergent      NPO Status: NPO Appropriate   Anesthesia Type: General.  Airway: oral.  Induction: intravenous, rapid sequence.  Maintenance: Balanced.   Techniques and Equipment:     - Airway:  Planned airway equipment includes video laryngoscope.     - Monitoring Plan: standard ASA monitoring     Consents            Postoperative Care    Pain management: multimodal analgesia.     Comments:    Other Comments: Preop versed for severe anxiety               Nathan Ibrahim MD    I have reviewed the pertinent notes and labs in the chart from the past 30 days and (re)examined the patient.  Any updates or changes from those notes are reflected in this note.    Clinically Significant Risk Factors                              # Morbid Obesity: Estimated body mass index is 41.34 kg/m  as calculated from the following:    Height as of 6/1/25: 1.575 m (5' 2\").    Weight as of 6/9/25: 102.5 kg (226 lb)., PRESENT ON ADMISSION     # Financial/Environmental Concerns: none               "

## 2025-06-12 NOTE — PLAN OF CARE
Goal Outcome Evaluation:      Plan of Care Reviewed With: patient             Patient alert and oriented x 4.  Denies shortness of breath, chest pain, and dizziness.  Intermediate nausea tolerated with Zofran as needed.  Patient's bowel sounds faint, abdomin  nontender with palpation, patient reported small soft stool.  Discussed bowel regiment with patient, at this time no enema but patient will request magnesium citrate as needed.  Left foot weakness baseline per patient.  At beginning of shift patient made aware of MRI with anesthesia around 3 pm.  Patient standby assist with walker.  Patient able to shower at this shift.

## 2025-06-12 NOTE — PROVIDER NOTIFICATION
"Brief update:    Paged re: nausea and lightheadedness while on toilet. Vitals stable.    Here w/ weakness and foot drop awaiting MRI    Discussed w/ nurse    Check orthostatic BP and pulse, page w/ these to determine next step    Assess for resolution of symptoms w/ pt laying down.    Please have vitals verified in chart.    Ivan Martinez MD  1:27 AM    Not orthostatic, but still symptomatic even laying down    500 ml bolus ordered (uncertain it will be of any benefit w/ negative orthostatics)  Can try dose meclizine (less likely vertigo)  Get Hgb now (no bleeding noted)    Nursing to update neurology given concern for CIDP, but awaiting MRI and LP prior to treatments. Uncertain if new symptoms would change acute management while we are unable to obtain these studies.    Vital signs:  Temp: 97.8  F (36.6  C) Temp src: Oral BP: (!) 143/80 Pulse: 85   Resp: 18 SpO2: 97 % O2 Device: None (Room air)        Estimated body mass index is 41.34 kg/m  as calculated from the following:    Height as of 6/1/25: 1.575 m (5' 2\").    Weight as of 6/9/25: 102.5 kg (226 lb).        "

## 2025-06-12 NOTE — CONSULTS
Care Management Initial Consult    General Information  Assessment completed with: PatientNevin  Type of CM/SW Visit: Initial Assessment    Primary Care Provider verified and updated as needed: Yes   Readmission within the last 30 days: no previous admission in last 30 days      Reason for Consult: other (see comments) (Elevated Risk)  Advance Care Planning: Advance Care Planning Reviewed: other (see comments) (Pt stated that she isn't working with [facility]. Sent email to [facility] to confirm if Pt has guardian.)          Communication Assessment  Patient's communication style: spoken language (English or Bilingual)             Cognitive  Cognitive/Neuro/Behavioral: WDL  Level of Consciousness: alert  Arousal Level: opens eyes spontaneously  Orientation: oriented x 4     Best Language: 0 - No aphasia  Speech: logical, spontaneous, clear    Living Environment:   People in home: alone     Current living Arrangements: apartment      Able to return to prior arrangements: yes       Family/Social Support:  Care provided by: self, other (see comments) (S workers)  Provides care for: no one, unable/limited ability to care for self, no one  Marital Status: Single  Support system:            Description of Support System: Supportive    Support Assessment: Difficulty establishing and maintaining relationships, Patient communicates needs well met, Complicated family dynamics    Current Resources:   Patient receiving home care services: No        Community Resources: County Worker, County Programs, Financial/Insurance  Equipment currently used at home: walker, rolling, walker, standard (Pt states that she has walkers, but is currently not using them)  Supplies currently used at home: None    Employment/Financial:  Employment Status: disabled        Financial Concerns: none   Referral to Financial Worker: No       Does the patient's insurance plan have a 3 day qualifying hospital stay waiver?   No    Lifestyle & Psychosocial Needs:  Social Drivers of Health     Food Insecurity: No Food Insecurity (3/2/2025)    [facility]    Food Insecurity     Do you worry your food will run out before you are able to buy more?: 1   Depression: At risk (3/12/2025)    Received from [facility]    PHQ-2     PHQ-2 TOTAL SCORE: 3   Housing Stability: Low Risk  (3/2/2025)    [facility] from [facility]    Housing Stability     What is your housing situation today?: 1   Tobacco Use: Low Risk  (5/31/2025)    Received from [facility]    Patient History     Smoking Tobacco Use: Never     Smokeless Tobacco Use: Never     Passive Exposure: Never   Financial Resource Strain: Medium Risk (3/2/2025)    [facility]    Financial Resource Strain     Difficulty of Paying Living Expenses: 2     Difficulty of Paying Living Expenses: 1   Alcohol Use: Not on file   Transportation Needs: Unmet Transportation Needs (3/2/2025)    Received from [employer] [facility]    Transportation Needs     Does lack of transportation keep you from medical appointments?: 1     Does lack of transportation keep you from work, meetings or getting things that you need?: 2   Physical Activity: Insufficiently Active (11/13/2024)    Received from [facility]    Exercise Vital Sign     Days of Exercise per Week: 3 days     Minutes of Exercise per Session: 10 min   Interpersonal Safety: Not At Risk (11/12/2024)    Received from [facility]    Humiliation, Afraid, Rape, and Kick questionnaire     Fear of Current or Ex-Partner: No     Emotionally Abused: No     Physically Abused: No     Sexually Abused: No   Stress: Not on file   Social Connections: Socially Integrated (3/2/2025)    Received from [facility]    Social Connections     Do you often feel  lonely or isolated from those around you?: 0   Health Literacy: Not on file       Functional Status:  Prior to admission patient needed assistance:   Dependent ADLs:: Independent  Dependent IADLs:: Cleaning, Cooking, Laundry, Shopping, Meal Preparation, Money Management, Transportation       Mental Health Status:          Chemical Dependency Status:                Values/Beliefs:  Spiritual, Cultural Beliefs, Protestant Practices, Values that affect care: no               Discussed  Partnership in Safe Discharge Planning  document with patient/family: No    Additional Information:  Consulted for elevated risk of readmission and discharge planning, writer met with Pt at bedside and introduced self and role in discharge planning. Pt lives in apartment, alone. Per Pt prior to admit, Pt was independent with all ADL's but uses a walker at times. Pt stated that she has DME equipment such as a shower chair, walker, 4WW walker, mobility scooter, and is also having her bathroom reconstructed for a bigger shower and a lower toilet seat. Pt stated that this is all being done through her CADI waiver. Pt also has [employer] workers come and do whatever tasks needed for her , along with driving her to needed appointments and outings. Pt denies any financial concerns at this time.Pt is on disability, has a CADI waiver, [employer] workers, [facility], [facility], SNAP, Cash assistance and has a rent voucher where she pays 30%.     Pt would be able to return home with no additional needs.     Inpatient Care Coordinator will continue to follow for discharge needs.      Next Steps: transportation set up.     ADDM: 1533-Writer contacted hon[employer] and asked about the status of the Pt's guardian. This was their response:  Pt has a guardian; however they are only authorized to decide place of abode. All other decisions are made by the patient.        Marika Centeno, RN, BSN  RN Care Coordinator  [facility]   Contact via  [employer]

## 2025-06-12 NOTE — PROGRESS NOTES
Spoke with MRI regarding time of MRI at [id number]. Spoke with anaesthesia time regarding time of 1540- 1635. MRI to reach out to anaesthesia regarding timing.

## 2025-06-12 NOTE — OR NURSING
PT A&OX4, AVSS in pre-op. Notes she is extremely anxious, Dr Ibrahim at bedside. VORB 2mg versed. RN placed pt on the monitor and and pt tolerated well. Transferred care to CRNA and sent to MRI.

## 2025-06-12 NOTE — ANESTHESIA CARE TRANSFER NOTE
Patient: Nevin Alvarado    Procedure: Procedure(s):  Anesthesia out of OR MRI of the thoracic spine with contrast       Diagnosis: Chronic inflammatory demyelinating neuropathy (H) [G61.81]  Diagnosis Additional Information: No value filed.    Anesthesia Type:   General     Note:    Oropharynx: spontaneously breathing and oropharynx clear of all foreign objects  Level of Consciousness: awake  Oxygen Supplementation: room air    Independent Airway: airway patency satisfactory and stable  Dentition: dentition unchanged  Vital Signs Stable: post-procedure vital signs reviewed and stable  Report to RN Given: handoff report given  Patient transferred to: PACU    Handoff Report: Identifed the Patient, Identified the Reponsible Provider, Reviewed the pertinent medical history, Discussed the surgical course, Reviewed Intra-OP anesthesia mangement and issues during anesthesia, Set expectations for post-procedure period and Allowed opportunity for questions and acknowledgement of understanding      Vitals:  Vitals Value    /80    Temp 36.4    Pulse 99    Resp 14    SpO2 95 %      Electronically Signed By: HU Mcintyre CRNA  June 12, 2025  6:20 PM

## 2025-06-12 NOTE — PLAN OF CARE
Goal Outcome Evaluation:      Plan of Care Reviewed With: patient          Outcome Evaluation: Home        Marika Centeno, RN, BSN  RN Care Coordinator  [facility]   Contact via [facility]

## 2025-06-12 NOTE — PROGRESS NOTES
CRS chart check     3 BMs recorded yesterday, including a larger BM per hospitalist note. Would continue bowel regimen and PRN suppositories/enemas, can titrate down if stools are becoming too loose. Would consider outpatient GI follow up for medical management of constipation. No role for surgical intervention, we will sign off, call with questions/concerns.     For questions/paging, please contact the CRS office at [phone].    Yaw Lizama PA-C  Physician Assistant    [facility]  [address] [place] [address]  [place] [address]  T: [phone]  F: [phone]

## 2025-06-12 NOTE — ANESTHESIA PROCEDURE NOTES
Airway       Patient location during procedure: OR       Procedure Start/Stop Times: 6/12/2025 4:27 PM  Staff -        Anesthesiologist:  Nathan Ibrahim MD       CRNA: Marcos Sherwood APRN CRNA       Performed By: CRNA  Consent for Airway        Urgency: elective  Indications and Patient Condition       Indications for airway management: isma-procedural       Induction type:RSI       Mask difficulty assessment: 0 - not attempted    Final Airway Details       Final airway type: endotracheal airway       Successful airway: ETT - single and Oral  Endotracheal Airway Details        ETT size (mm): 7.0       Cuffed: yes       Cuff volume (mL): 7       Successful intubation technique: video laryngoscopy       VL Blade Size: Nix 3       Grade View of Cords: 1       Adjucts: stylet       Position: Right       Measured from: lips       Secured at (cm): 21       Bite block used: None    Post intubation assessment        Placement verified by: capnometry, equal breath sounds and chest rise        Number of attempts at approach: 1       Number of other approaches attempted: 0       Secured with: tape       Ease of procedure: easy       Dentition: Intact and Unchanged    Medication(s) Administered   Medication Administration Time: 6/12/2025 4:27 PM    Additional Comments       Prior to induction, patient positions self to comfort on pillow. Head and neck remained neutral, midline, and unchanged in position throughout induction and intubation.

## 2025-06-12 NOTE — PROGRESS NOTES
Shift Summary [id number]     Admitting Diagnosis: Chronic inflammatory demyelinating polyneuropathy (H) [G61.81]  Leg weakness, bilateral [R29.898]   Vitals VSS on RA   Pain 7/10. Taking PRN medications for headache  A&Ox4  Voiding WNL - PRV 12  Mobility SBA w/ Walker  CMS baseline numbness and tingling   Neuros intact  GI Still feeling a little constipated   Diet regular   Plan:  MRI @ 1530 6/12. May need to be NPO at midnight for it

## 2025-06-12 NOTE — PROGRESS NOTES
Pt went down to pre op when RN assumed care. Pt came up to unit at 1900. Settled pt, chair alarm on, vitals done.

## 2025-06-12 NOTE — PROGRESS NOTES
Pt updated on potential PICC placement following lumbar tap tomorrow if results indicate need for IVIG. Aware PICC may not be placed until results from tap  are resulted.  Unable to place port at time of lumbar tap.

## 2025-06-12 NOTE — PROGRESS NOTES
[facility]    Medicine Progress Note - Hospitalist Service    Date of Admission:  6/10/2025    Assessment & Plan   Nevin lAvarado is a 34 year old female admitted on 6/10/2025. She initially presented 6/9/25 for worsening leg pain, weakness, and falls. Given her hx of chronic inflammatory demyelinating polyneuropathy, her case was discussed with Neurology in the ER and they recommended admission overnight so she could be evaluated by Neurology in person in the morning however due to an appointment 6/10, she ultimately requested discharge with strict return precautions. She presents again 6/10 for evaluation of lower extremity weakness, neuropathic pain as well as midline cervical, thoracic, and lumbar back pain.  She also reports severe headaches.       Worsening neuropathy with falls   Hx of chronic inflammatory demyelinating polyneuropathy: Pt follows with Dr. Chance of [facility], refer to detailed note from 5/15/25. She has an upcoming appointment 6/30 and appears to have MR brain, cervical, thoracic and lumbar spine, EMG of the upper and lower extremities ordered at that time. She was also referred to medical genetics.   *CIDP diagnosed in 2015, characterized by the lower extremities.  She was treated with IVIG between 2015 and 2020, with weekly to biweekly IVIG infusions.  At that point, she discontinued IVIG because of lack of efficacy, but has noted that symptoms are worsening since then.  In 2024 she had worsening in her symptoms, presented to the [facility] ER, noted to have cauda equina thickening, and worsening sensory motor peripheral neuropathy. She has been treated with plasma exchange x 7 sessions, with some improvement in strength following plasma exchange.   *Per Neuro note from 5/15/25, pt typically experiences generalized weakness in her legs, L>R; has a foot drop. Noted tremors in her hands, numbness and tingling in her arms, with sensitivity to pressure causing  "numbness from her arms to her feet. She also has numbness in her left foot, more so than the right, and weakness in both legs, worse on the left.  *Labs ordered per Dr. Chance 5/15: Vit D, B12 WNL, paraneoplastic autoantibody labs negative, SSA [place] antibody IgG negative, SSB [place] antibody IgG negative. Glutamic acid decarboxylase antibody negative. Methylmalonic acid 0.14. CRP mildly elevated at 7.6, axonal, autoimmune/paraneoplastic panel negative. Peripheral nervous system demyelinating neuropathy, autoimmune eval negative. 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 ([id number]), VARIANT OF UNCERTAIN SIGNIFICANCE (refer to result for complete details on recommendations)  *6/10 Sed rate>140, CRP 20.82  - General Neurology consulted, MRI Brain C/T/L spine with anesthesia afternoon 6/12 (not yet completed)  - Neuro checks per routine  - LP planned for 6/13  - Continue PTA Lyrica (with dose adjustment as below) and PRN Flexeril   - PT consult  - consideration for IVIG, if LP suggestive, then plan will be to place a PICC on 6/13 and then follow up outpatient for port placement (IR is unable to accommodate port placement on 6/13--confirmed 6/12 with Chitra)    Cervicogenic headaches with left-sided predominance are secondary to occipital neuralgia: Noted per 5/15 Neurology note. Pt reports headaches have been worsening, described as a pressure in her head, \"worst headache of my life\" that will linger for 24 hours, dissipate, then come back after 12 hours with some associated blurred vision.   *6/1 ED Visit, ativan 1mg, mag sulfate 2g, 1L bolus but did not alleviate her pain.  *6/10 toradol trialed, no improvement.  - Neurology following  - imitrex available PRN.  - PTA lyrica increased with 100mg dose added midday per neurology rec  - 6/11 2g IV mag given and started prednisone 60mg daily for 5 days     Constipation, improved  Hx of thrombosed hemorrhoids: Hx of this, reports no BM X1 week, " not passing flatus. Some abdominal pain in lower abdomen radiating to LUQ and epigastric region. Reports that she has trialed colace, miralax, suppositories, manual disimpaction outpatient with no relief. Not on narcotics. ? If related to mounjaro. Recently had a colonoscopy with [facility] which was reported as normal, but was referred to Colorectal Surgery for thrombosed hemorrhoids. No intervention performed as pt is on Eliquis. She has subsequently developed a few more hemorrhoids.   *6/10 KUB moderate stool burden  -Colorectal Surgery consulted for significant constipation, appreciate recommendations, signed off on 6/12  -Senokot BID, Miralax TID, PRN suppository and mag citrate available   -reduce as needed  -*good BM 6/11 midday and overnight, but still feels constipated on 6/12     Atypical chest pain- resolved  Localized to center of the chest, described as a burning. No radiation. Not aggravated with activity. No SOB. Vitals stable. No cardiac hx. ? GERD.   *EKG: sinus  - PRN maalox     Hx of PE (4/2024 and 2019)  - hold PTA eliquis while awaiting LP, resume post procedure 6/13  - intermittent heparin while awaiting LP     Obesity: PTA Adriel has been on hold in the setting of recent outpatient procedures (colonoscopy, endoscopy, knee replacement), pt tried restarting, but became acutely ill with N/V, thus has remained off for now and will work with her outpatient weight management team to discuss resumption.      Fibromyalgia: Follows with [facility]. Has been put on Plaquenil 200 mg BID for unclear rheumatologic process. Defer management to them. Will continue for now.      ZOHRA: Does not tolerate CPAP      Generalized anxiety disorder   Borderline personality disorder   MDD   PTSD  Hx of self injurious behavior: Noted per Psychiatry note 4/2024. Reports stable mental health.   - Resume PTA meds including Buspar, Amitriptyline, PRN Klonopin and Atarax      Frequent utilization of healthcare system: Noted  "care plan.          Diet: NPO for Procedure/Surgery per Anesthesia Guidelines Except for: Meds; Clear liquids before procedure/surgery: ADULT (Age GREATER than or Equal to 18 years) - Clear liquids 2 hours before procedure/surgery    DVT Prophylaxis: Pneumatic Compression Devices  Hazel Catheter: Not present  Lines: None     Cardiac Monitoring: None  Code Status: Full Code      Clinically Significant Risk Factors                              # Morbid Obesity: Estimated body mass index is 41.34 kg/m  as calculated from the following:    Height as of 6/1/25: 1.575 m (5' 2\").    Weight as of 6/9/25: 102.5 kg (226 lb)., PRESENT ON ADMISSION       # Financial/Environmental Concerns: none         Social Drivers of Health    Depression: At risk (3/12/2025)    Received from [facility]    PHQ-2     PHQ-2 TOTAL SCORE: 3   Financial Resource Strain: Medium Risk (3/2/2025)    Received from [facility]    Financial Resource Strain     Difficulty of Paying Living Expenses: 2     Difficulty of Paying Living Expenses: 1   Transportation Needs: Unmet Transportation Needs (3/2/2025)    Received from [facility]    Transportation Needs     Does lack of transportation keep you from medical appointments?: 1     Does lack of transportation keep you from work, meetings or getting things that you need?: 2   Physical Activity: Insufficiently Active (11/13/2024)    Received from [facility]    Exercise Vital Sign     Days of Exercise per Week: 3 days     Minutes of Exercise per Session: 10 min          Disposition Plan     Medically Ready for Discharge: Anticipated in 2-4 Days  Pending LP/MRIs, IVIG initiation           Alma Vaughan DO  Hospitalist Service  [facility]  Securely message with [employer] (more info)  Text page via [facility] Paging/Directory "   ______________________________________________________________________    Interval History   Patient seen and examined. Overnight had issue with feeling lightheaded and nauseous while up to toilet. Felt fine getting there, but had onset while urinating (not bearing down). Got some fluids and then felt better later. Hgb was stable. Had BMs overnight, but still feels constipated. Would like to try mag citrate after her MRIs. Spoke with neurology regarding Port. Also spoke with IR, unable to accommodate port on 6/13 due to schedule, would recommend done outpatient. Plan to do PICC instead if LP results indicate that IVIG should be started.  Headache a little better today but she isn't sure what helped.    Physical Exam   Vital Signs: Temp: 98.5  F (36.9  C) Temp src: Oral BP: (!) 153/87 Pulse: 86   Resp: 18 SpO2: 97 % O2 Device: None (Room air)    Weight: 0 lbs 0 oz    Constitutional: Awake, alert, cooperative, no apparent distress, sitting in bed  Respiratory: Clear to auscultation bilaterally, no crackles or wheezing  Cardiovascular: Regular rate and rhythm, normal S1 and S2, and no murmur noted  GI: Normal bowel sounds, soft, non-distended, non-tender  Skin/Integumen: No rashes, no cyanosis, no edema  Other:  Left foot drop (chronic). Plantar flexion equal and 5/5 bilaterally. Right hip flexor 4.5/5, left hip flexor 4/5.    Medical Decision Making       55 MINUTES SPENT BY ME on the date of service doing chart review, history, exam, documentation & further activities per the note.      Data     I have personally reviewed the following data over the past 24 hrs:    N/A  \   13.6   / N/A     N/A N/A N/A /  215 (H)   N/A N/A N/A \       Imaging results reviewed over the past 24 hrs:   No results found for this or any previous visit (from the past 24 hours).

## 2025-06-12 NOTE — PROGRESS NOTES
Paged neurology per hospitalist request due to new dizziness.  Talked to Dr. Gamble about situation (can see hospitalist note) no neuro changes on patient after doing a full neuro.  Patient did say she felt her eyes twitching when got back into bed but this was not witness by RN during that or when doing the neuro exam.  Will give meclizine and bolus but DR. Gamble said to just monitor and if any new changes on neuro exam then page hospitalist and get a CT but otherwise can wait for MRI.      Dr. Gamble called back to check on patient, writer did just take patient to the bathroom about 30 mins prior and was still dizzy- meclizine has was not given yet as not delivered by pharmacy.  IF neuros change or not getting better wants a CTA.     When writer got med went into patient room and she said the dizziness is still there but improved.

## 2025-06-13 ENCOUNTER — APPOINTMENT (OUTPATIENT)
Dept: PHYSICAL THERAPY | Facility: CLINIC | Age: 35
DRG: 074 | End: 2025-06-13
Attending: STUDENT IN AN ORGANIZED HEALTH CARE EDUCATION/TRAINING PROGRAM
Payer: COMMERCIAL

## 2025-06-13 ENCOUNTER — APPOINTMENT (OUTPATIENT)
Dept: GENERAL RADIOLOGY | Facility: CLINIC | Age: 35
DRG: 074 | End: 2025-06-13
Attending: STUDENT IN AN ORGANIZED HEALTH CARE EDUCATION/TRAINING PROGRAM
Payer: COMMERCIAL

## 2025-06-13 LAB
ANION GAP SERPL CALCULATED.3IONS-SCNC: 15 MMOL/L (ref 7–15)
APPEARANCE CSF: CLEAR
APTT PPP: 24 SECONDS (ref 22–38)
BUN SERPL-MCNC: 12.7 MG/DL (ref 6–20)
C GATTII+NEOFOR DNA CSF QL NAA+NON-PROBE: NEGATIVE
CALCIUM SERPL-MCNC: 9.2 MG/DL (ref 8.8–10.4)
CHLORIDE SERPL-SCNC: 107 MMOL/L (ref 98–107)
CMV DNA CSF QL NAA+NON-PROBE: NEGATIVE
COLOR CSF: COLORLESS
CREAT SERPL-MCNC: 0.61 MG/DL (ref 0.51–0.95)
CRP SERPL-MCNC: 4.4 MG/L
E COLI K1 AG CSF QL: NEGATIVE
EGFRCR SERPLBLD CKD-EPI 2021: >90 ML/MIN/1.73M2
ERYTHROCYTE [DISTWIDTH] IN BLOOD BY AUTOMATED COUNT: 14.1 % (ref 10–15)
ERYTHROCYTE [SEDIMENTATION RATE] IN BLOOD BY WESTERGREN METHOD: 16 MM/HR (ref 0–20)
EV RNA SPEC QL NAA+PROBE: NEGATIVE
GLUCOSE CSF-MCNC: 108 MG/DL (ref 40–70)
GLUCOSE SERPL-MCNC: 197 MG/DL (ref 70–99)
GP B STREP DNA CSF QL NAA+NON-PROBE: NEGATIVE
HAEM INFLU DNA CSF QL NAA+NON-PROBE: NEGATIVE
HCO3 SERPL-SCNC: 18 MMOL/L (ref 22–29)
HCT VFR BLD AUTO: 40.7 % (ref 35–47)
HGB BLD-MCNC: 14 G/DL (ref 11.7–15.7)
HHV6 DNA CSF QL NAA+NON-PROBE: NEGATIVE
HSV1 DNA CSF QL NAA+NON-PROBE: NEGATIVE
HSV2 DNA CSF QL NAA+NON-PROBE: NEGATIVE
INR PPP: 1.07 (ref 0.85–1.15)
L MONOCYTOG DNA CSF QL NAA+NON-PROBE: NEGATIVE
MAGNESIUM SERPL-MCNC: 2.1 MG/DL (ref 1.7–2.3)
MCH RBC QN AUTO: 30.6 PG (ref 26.5–33)
MCHC RBC AUTO-ENTMCNC: 34.4 G/DL (ref 31.5–36.5)
MCV RBC AUTO: 89 FL (ref 78–100)
N MEN DNA CSF QL NAA+NON-PROBE: NEGATIVE
PARECHOVIRUS A RNA CSF QL NAA+NON-PROBE: NEGATIVE
PHOSPHATE SERPL-MCNC: 1.7 MG/DL (ref 2.5–4.5)
PLATELET # BLD AUTO: 288 10E3/UL (ref 150–450)
POTASSIUM SERPL-SCNC: 4.4 MMOL/L (ref 3.4–5.3)
PROT CSF-MCNC: 58.9 MG/DL (ref 15–45)
PROTHROMBIN TIME: 13.7 SECONDS (ref 11.8–14.8)
RBC # BLD AUTO: 4.57 10E6/UL (ref 3.8–5.2)
RBC # CSF MANUAL: 2 /UL (ref 0–2)
S PNEUM DNA CSF QL NAA+NON-PROBE: NEGATIVE
SODIUM SERPL-SCNC: 140 MMOL/L (ref 135–145)
TUBE # CSF: 4
VZV DNA CSF QL NAA+NON-PROBE: NEGATIVE
WBC # BLD AUTO: 8.3 10E3/UL (ref 4–11)
WBC # CSF MANUAL: 0 /UL (ref 0–5)

## 2025-06-13 PROCEDURE — 84157 ASSAY OF PROTEIN OTHER: CPT | Performed by: STUDENT IN AN ORGANIZED HEALTH CARE EDUCATION/TRAINING PROGRAM

## 2025-06-13 PROCEDURE — 250N000013 HC RX MED GY IP 250 OP 250 PS 637

## 2025-06-13 PROCEDURE — 86140 C-REACTIVE PROTEIN: CPT | Performed by: HOSPITALIST

## 2025-06-13 PROCEDURE — 85027 COMPLETE CBC AUTOMATED: CPT | Performed by: HOSPITALIST

## 2025-06-13 PROCEDURE — 272N000451 HC KIT SHRLOCK 5FR POWER PICC DOUBLE LUMEN

## 2025-06-13 PROCEDURE — 97161 PT EVAL LOW COMPLEX 20 MIN: CPT | Mod: GP | Performed by: PHYSICAL THERAPIST

## 2025-06-13 PROCEDURE — 36415 COLL VENOUS BLD VENIPUNCTURE: CPT | Performed by: HOSPITALIST

## 2025-06-13 PROCEDURE — 97530 THERAPEUTIC ACTIVITIES: CPT | Mod: GP | Performed by: PHYSICAL THERAPIST

## 2025-06-13 PROCEDURE — 87205 SMEAR GRAM STAIN: CPT | Performed by: STUDENT IN AN ORGANIZED HEALTH CARE EDUCATION/TRAINING PROGRAM

## 2025-06-13 PROCEDURE — 84100 ASSAY OF PHOSPHORUS: CPT | Performed by: HOSPITALIST

## 2025-06-13 PROCEDURE — 97116 GAIT TRAINING THERAPY: CPT | Mod: GP | Performed by: PHYSICAL THERAPIST

## 2025-06-13 PROCEDURE — 250N000009 HC RX 250: Performed by: STUDENT IN AN ORGANIZED HEALTH CARE EDUCATION/TRAINING PROGRAM

## 2025-06-13 PROCEDURE — 83735 ASSAY OF MAGNESIUM: CPT | Performed by: HOSPITALIST

## 2025-06-13 PROCEDURE — 62328 DX LMBR SPI PNXR W/FLUOR/CT: CPT

## 2025-06-13 PROCEDURE — 87483 CNS DNA AMP PROBE TYPE 12-25: CPT | Performed by: STUDENT IN AN ORGANIZED HEALTH CARE EDUCATION/TRAINING PROGRAM

## 2025-06-13 PROCEDURE — 84100 ASSAY OF PHOSPHORUS: CPT | Performed by: INTERNAL MEDICINE

## 2025-06-13 PROCEDURE — 36569 INSJ PICC 5 YR+ W/O IMAGING: CPT | Mod: 52

## 2025-06-13 PROCEDURE — 250N000013 HC RX MED GY IP 250 OP 250 PS 637: Performed by: STUDENT IN AN ORGANIZED HEALTH CARE EDUCATION/TRAINING PROGRAM

## 2025-06-13 PROCEDURE — 250N000011 HC RX IP 250 OP 636: Performed by: STUDENT IN AN ORGANIZED HEALTH CARE EDUCATION/TRAINING PROGRAM

## 2025-06-13 PROCEDURE — 120N000013 HC R&B IMCU

## 2025-06-13 PROCEDURE — 89050 BODY FLUID CELL COUNT: CPT | Performed by: STUDENT IN AN ORGANIZED HEALTH CARE EDUCATION/TRAINING PROGRAM

## 2025-06-13 PROCEDURE — 85652 RBC SED RATE AUTOMATED: CPT | Performed by: HOSPITALIST

## 2025-06-13 PROCEDURE — 85610 PROTHROMBIN TIME: CPT | Performed by: HOSPITALIST

## 2025-06-13 PROCEDURE — 99233 SBSQ HOSP IP/OBS HIGH 50: CPT | Performed by: INTERNAL MEDICINE

## 2025-06-13 PROCEDURE — 80048 BASIC METABOLIC PNL TOTAL CA: CPT | Performed by: HOSPITALIST

## 2025-06-13 PROCEDURE — 250N000013 HC RX MED GY IP 250 OP 250 PS 637: Performed by: PHYSICIAN ASSISTANT

## 2025-06-13 PROCEDURE — 87070 CULTURE OTHR SPECIMN AEROBIC: CPT | Performed by: STUDENT IN AN ORGANIZED HEALTH CARE EDUCATION/TRAINING PROGRAM

## 2025-06-13 PROCEDURE — 36415 COLL VENOUS BLD VENIPUNCTURE: CPT | Performed by: INTERNAL MEDICINE

## 2025-06-13 PROCEDURE — 009U3ZX DRAINAGE OF SPINAL CANAL, PERCUTANEOUS APPROACH, DIAGNOSTIC: ICD-10-PCS | Performed by: PHYSICIAN ASSISTANT

## 2025-06-13 PROCEDURE — 82784 ASSAY IGA/IGD/IGG/IGM EACH: CPT | Performed by: STUDENT IN AN ORGANIZED HEALTH CARE EDUCATION/TRAINING PROGRAM

## 2025-06-13 PROCEDURE — 85730 THROMBOPLASTIN TIME PARTIAL: CPT | Performed by: HOSPITALIST

## 2025-06-13 PROCEDURE — 250N000012 HC RX MED GY IP 250 OP 636 PS 637: Performed by: STUDENT IN AN ORGANIZED HEALTH CARE EDUCATION/TRAINING PROGRAM

## 2025-06-13 PROCEDURE — 82945 GLUCOSE OTHER FLUID: CPT | Performed by: STUDENT IN AN ORGANIZED HEALTH CARE EDUCATION/TRAINING PROGRAM

## 2025-06-13 RX ORDER — DIPHENHYDRAMINE HYDROCHLORIDE 50 MG/ML
25 INJECTION, SOLUTION INTRAMUSCULAR; INTRAVENOUS
Status: COMPLETED | OUTPATIENT
Start: 2025-06-13 | End: 2025-06-17

## 2025-06-13 RX ORDER — HUMAN IMMUNOGLOBULIN G 20 G/200ML
0.4 LIQUID INTRAVENOUS EVERY 24 HOURS
Status: DISCONTINUED | OUTPATIENT
Start: 2025-06-13 | End: 2025-06-14

## 2025-06-13 RX ORDER — SODIUM CHLORIDE, SODIUM LACTATE, POTASSIUM CHLORIDE, CALCIUM CHLORIDE 600; 310; 30; 20 MG/100ML; MG/100ML; MG/100ML; MG/100ML
INJECTION, SOLUTION INTRAVENOUS CONTINUOUS
Status: DISCONTINUED | OUTPATIENT
Start: 2025-06-13 | End: 2025-06-13

## 2025-06-13 RX ORDER — DIPHENHYDRAMINE HCL 25 MG
50 CAPSULE ORAL EVERY 24 HOURS
Status: COMPLETED | OUTPATIENT
Start: 2025-06-13 | End: 2025-06-16

## 2025-06-13 RX ORDER — HYDROMORPHONE HCL IN WATER/PF 6 MG/30 ML
0.4 PATIENT CONTROLLED ANALGESIA SYRINGE INTRAVENOUS EVERY 5 MIN PRN
Status: DISCONTINUED | OUTPATIENT
Start: 2025-06-13 | End: 2025-06-13

## 2025-06-13 RX ORDER — DEXTROSE MONOHYDRATE 25 G/50ML
25-50 INJECTION, SOLUTION INTRAVENOUS
Status: DISCONTINUED | OUTPATIENT
Start: 2025-06-13 | End: 2025-06-19 | Stop reason: HOSPADM

## 2025-06-13 RX ORDER — ALBUTEROL SULFATE 0.83 MG/ML
2.5 SOLUTION RESPIRATORY (INHALATION)
Status: DISCONTINUED | OUTPATIENT
Start: 2025-06-13 | End: 2025-06-19 | Stop reason: HOSPADM

## 2025-06-13 RX ORDER — LIDOCAINE 40 MG/G
CREAM TOPICAL
Status: ACTIVE | OUTPATIENT
Start: 2025-06-13 | End: 2025-06-16

## 2025-06-13 RX ORDER — DEXAMETHASONE SODIUM PHOSPHATE 4 MG/ML
4 INJECTION, SOLUTION INTRA-ARTICULAR; INTRALESIONAL; INTRAMUSCULAR; INTRAVENOUS; SOFT TISSUE
Status: DISCONTINUED | OUTPATIENT
Start: 2025-06-13 | End: 2025-06-13

## 2025-06-13 RX ORDER — FENTANYL CITRATE 50 UG/ML
50 INJECTION, SOLUTION INTRAMUSCULAR; INTRAVENOUS EVERY 5 MIN PRN
Status: DISCONTINUED | OUTPATIENT
Start: 2025-06-13 | End: 2025-06-13

## 2025-06-13 RX ORDER — ONDANSETRON 2 MG/ML
4 INJECTION INTRAMUSCULAR; INTRAVENOUS EVERY 30 MIN PRN
Status: DISCONTINUED | OUTPATIENT
Start: 2025-06-13 | End: 2025-06-13

## 2025-06-13 RX ORDER — FENTANYL CITRATE 50 UG/ML
25 INJECTION, SOLUTION INTRAMUSCULAR; INTRAVENOUS EVERY 5 MIN PRN
Status: DISCONTINUED | OUTPATIENT
Start: 2025-06-13 | End: 2025-06-13

## 2025-06-13 RX ORDER — HYDROMORPHONE HCL IN WATER/PF 6 MG/30 ML
0.2 PATIENT CONTROLLED ANALGESIA SYRINGE INTRAVENOUS EVERY 5 MIN PRN
Status: DISCONTINUED | OUTPATIENT
Start: 2025-06-13 | End: 2025-06-13

## 2025-06-13 RX ORDER — ONDANSETRON 4 MG/1
4 TABLET, ORALLY DISINTEGRATING ORAL EVERY 30 MIN PRN
Status: DISCONTINUED | OUTPATIENT
Start: 2025-06-13 | End: 2025-06-13

## 2025-06-13 RX ORDER — LIDOCAINE HYDROCHLORIDE 10 MG/ML
5 INJECTION, SOLUTION EPIDURAL; INFILTRATION; INTRACAUDAL; PERINEURAL ONCE
Status: COMPLETED | OUTPATIENT
Start: 2025-06-13 | End: 2025-06-13

## 2025-06-13 RX ORDER — NALOXONE HYDROCHLORIDE 0.4 MG/ML
0.1 INJECTION, SOLUTION INTRAMUSCULAR; INTRAVENOUS; SUBCUTANEOUS
Status: DISCONTINUED | OUTPATIENT
Start: 2025-06-13 | End: 2025-06-13

## 2025-06-13 RX ORDER — ALBUTEROL SULFATE 90 UG/1
1-2 INHALANT RESPIRATORY (INHALATION)
Status: DISCONTINUED | OUTPATIENT
Start: 2025-06-13 | End: 2025-06-19 | Stop reason: HOSPADM

## 2025-06-13 RX ORDER — MEPERIDINE HYDROCHLORIDE 25 MG/ML
25 INJECTION INTRAMUSCULAR; INTRAVENOUS; SUBCUTANEOUS
Refills: 0 | Status: DISCONTINUED | OUTPATIENT
Start: 2025-06-13 | End: 2025-06-19 | Stop reason: HOSPADM

## 2025-06-13 RX ORDER — METHYLPREDNISOLONE SODIUM SUCCINATE 40 MG/ML
40 INJECTION INTRAMUSCULAR; INTRAVENOUS
Status: DISCONTINUED | OUTPATIENT
Start: 2025-06-13 | End: 2025-06-18

## 2025-06-13 RX ORDER — DIPHENHYDRAMINE HYDROCHLORIDE 50 MG/ML
50 INJECTION, SOLUTION INTRAMUSCULAR; INTRAVENOUS EVERY 24 HOURS
Status: COMPLETED | OUTPATIENT
Start: 2025-06-13 | End: 2025-06-16

## 2025-06-13 RX ORDER — ACETAMINOPHEN 325 MG/1
650 TABLET ORAL EVERY 24 HOURS
Status: COMPLETED | OUTPATIENT
Start: 2025-06-13 | End: 2025-06-16

## 2025-06-13 RX ORDER — DIPHENHYDRAMINE HYDROCHLORIDE 50 MG/ML
50 INJECTION, SOLUTION INTRAMUSCULAR; INTRAVENOUS
Status: COMPLETED | OUTPATIENT
Start: 2025-06-13 | End: 2025-06-17

## 2025-06-13 RX ORDER — NICOTINE POLACRILEX 4 MG
15-30 LOZENGE BUCCAL
Status: DISCONTINUED | OUTPATIENT
Start: 2025-06-13 | End: 2025-06-19 | Stop reason: HOSPADM

## 2025-06-13 RX ADMIN — PREDNISONE 60 MG: 20 TABLET ORAL at 08:38

## 2025-06-13 RX ADMIN — PANTOPRAZOLE SODIUM 40 MG: 40 TABLET, DELAYED RELEASE ORAL at 08:38

## 2025-06-13 RX ADMIN — AMITRIPTYLINE HYDROCHLORIDE 50 MG: 50 TABLET, FILM COATED ORAL at 22:03

## 2025-06-13 RX ADMIN — Medication: at 08:43

## 2025-06-13 RX ADMIN — POTASSIUM & SODIUM PHOSPHATES POWDER PACK 280-160-250 MG 2 PACKET: 280-160-250 PACK at 17:10

## 2025-06-13 RX ADMIN — POLYETHYLENE GLYCOL 3350 17 G: 17 POWDER, FOR SOLUTION ORAL at 17:10

## 2025-06-13 RX ADMIN — POTASSIUM & SODIUM PHOSPHATES POWDER PACK 280-160-250 MG 2 PACKET: 280-160-250 PACK at 22:06

## 2025-06-13 RX ADMIN — POLYETHYLENE GLYCOL 3350 17 G: 17 POWDER, FOR SOLUTION ORAL at 22:03

## 2025-06-13 RX ADMIN — CETIRIZINE HYDROCHLORIDE 10 MG: 10 TABLET, FILM COATED ORAL at 08:38

## 2025-06-13 RX ADMIN — SENNOSIDES AND DOCUSATE SODIUM 1 TABLET: 50; 8.6 TABLET ORAL at 08:38

## 2025-06-13 RX ADMIN — PREGABALIN 200 MG: 100 CAPSULE ORAL at 20:51

## 2025-06-13 RX ADMIN — ACETAMINOPHEN 650 MG: 325 TABLET, FILM COATED ORAL at 22:23

## 2025-06-13 RX ADMIN — SENNOSIDES AND DOCUSATE SODIUM 2 TABLET: 50; 8.6 TABLET ORAL at 22:04

## 2025-06-13 RX ADMIN — LIDOCAINE HYDROCHLORIDE 2.5 ML: 10 INJECTION, SOLUTION EPIDURAL; INFILTRATION; INTRACAUDAL; PERINEURAL at 11:15

## 2025-06-13 RX ADMIN — NORETHINDRONE ACETATE 5 MG: 5 TABLET ORAL at 08:39

## 2025-06-13 RX ADMIN — PREGABALIN 100 MG: 100 CAPSULE ORAL at 08:38

## 2025-06-13 RX ADMIN — POLYETHYLENE GLYCOL 3350 17 G: 17 POWDER, FOR SOLUTION ORAL at 08:39

## 2025-06-13 RX ADMIN — BUSPIRONE HYDROCHLORIDE 15 MG: 15 TABLET ORAL at 08:38

## 2025-06-13 RX ADMIN — CYCLOBENZAPRINE 5 MG: 5 TABLET, FILM COATED ORAL at 22:23

## 2025-06-13 RX ADMIN — LORAZEPAM 1 MG: 2 INJECTION INTRAMUSCULAR; INTRAVENOUS at 10:22

## 2025-06-13 RX ADMIN — PREGABALIN 100 MG: 100 CAPSULE ORAL at 12:12

## 2025-06-13 RX ADMIN — BUSPIRONE HYDROCHLORIDE 15 MG: 15 TABLET ORAL at 20:52

## 2025-06-13 RX ADMIN — HYDROXYCHLOROQUINE SULFATE 200 MG: 200 TABLET, FILM COATED ORAL at 08:38

## 2025-06-13 RX ADMIN — CETIRIZINE HYDROCHLORIDE 10 MG: 10 TABLET, FILM COATED ORAL at 20:52

## 2025-06-13 RX ADMIN — HYDROXYCHLOROQUINE SULFATE 200 MG: 200 TABLET, FILM COATED ORAL at 22:03

## 2025-06-13 ASSESSMENT — ACTIVITIES OF DAILY LIVING (ADL)
ADLS_ACUITY_SCORE: 65
ADLS_ACUITY_SCORE: 69
ADLS_ACUITY_SCORE: 65
ADLS_ACUITY_SCORE: 69
ADLS_ACUITY_SCORE: 65
ADLS_ACUITY_SCORE: 69
ADLS_ACUITY_SCORE: 65

## 2025-06-13 NOTE — PROGRESS NOTES
"SPIRITUAL HEALTH SERVICES - Progress Note  Ortho Spine    Referral Source: Staff referral-- pt. Looking for Bible.    - Visited Nevin along with a couple of staff members present.  - Someone found her a Bible upon her request. Pt did not expect to be in the hospital for as long as it has been and thus was looking for a Bible to read while away from home.  - Attends [employer] in [place]. Fellow congregants as well as pastoral staff from her community have visited her and are coming by again.   - Nevin mentioned discomfort with not knowing the full extent of her medical issues and has been in need of extra reading materials to entertain her and keep her from \"boredom.\" Her friend has come by with books and her journal.    Plan: Nevin is aware of spiritual health services and knows how to contact us should a need arise.      Jim Enriquez    Intern    [facility] available 24/7 for emergent requests/referrals, either by paging the on-call  or by entering an ASAP/STAT consult in Epic (this will also page the on-call ).   "

## 2025-06-13 NOTE — PROGRESS NOTES
06/13/25 0900   Appointment Info   Signing Clinician's Name / Credentials (PT) Diane Telles, RANDAL   Living Environment   People in Home alone   Current Living Arrangements apartment   Home Accessibility no concerns   Transportation Anticipated other (see comments)  (has either hc workers that A or use medi-transport)   Living Environment Comments no stairs   Self-Care   Usual Activity Tolerance moderate   Current Activity Tolerance moderate   Equipment Currently Used at Home walker, rolling   Fall history within last six months yes   Number of times patient has fallen within last six months 2  (one recently)   Activity/Exercise/Self-Care Comment Manages in bathroom I'lly. States she has a license but not a car; has health care transport. Varies between using a WW and not. Reports she is a lot better than a year ago. Has worked hard in OT and PT to get to point of not using WW. Endy needing one though. Has 15 hours of PCA care per week. They assist with household chores and taking her to get groceries. Sometimes appointments. Pt. dresses and toilets I'lly.   General Information   Onset of Illness/Injury or Date of Surgery 06/10/25   Referring Physician Nevin Gamble MD   Patient/Family Therapy Goals Statement (PT) Plans to d/c to home at discharge. Would like OT to see her as well. Had them in the past and they have been helpful with energy conservation techniques and endurance with ADL's in stance   Pertinent History of Current Problem (include personal factors and/or comorbidities that impact the POC) Nevin Alvarado is a 34 year old female admitted on 6/10/2025. She initially presented 6/9/25 for worsening leg pain, weakness, and falls. Given her hx of chronic inflammatory demyelinating polyneuropathy, her case was discussed with Neurology in the ER and they recommended admission overnight so she could be evaluated by Neurology in person in the morning however due to an appointment 6/10, she  ultimately requested discharge with strict return precautions. She presents again 6/10 for evaluation of lower extremity weakness, neuropathic pain as well as midline cervical, thoracic, and lumbar back pain.  She also reports severe headaches.       Worsening neuropathy with falls   Hx of chronic inflammatory demyelinating polyneuropathy   Existing Precautions/Restrictions fall   General Observations Long sitting in bed; leaning forward onto thighs; talking with nursing   Cognition   Affect/Mental Status (Cognition) WFL   Orientation Status (Cognition) oriented x 4   Follows Commands (Cognition) WFL   Behavioral Issues   (mild anxiousness)   Pain Assessment   Patient Currently in Pain No   Integumentary/Edema   Integumentary/Edema no deficits were identifed   Posture    Posture Forward head position   Range of Motion (ROM)   Range of Motion ROM deficits secondary to weakness   ROM Comment moderate foot drop on L, mild on R; chronic. Reports has AFO's and KAFO's but feels more unsafe with them. Does have neutral dorsiflexion.   Strength (Manual Muscle Testing)   Strength (Manual Muscle Testing) Deficits observed during functional mobility   Bed Mobility   Comment, (Bed Mobility) Supine with HOB elevated to sitting on EOB I'lly   Transfers   Comment, (Transfers) STS with CGA; using WW   Gait/Stairs (Locomotion)   Comment, (Gait/Stairs) Amb. 15' for eval with WW and CGA   Balance   Balance Comments Imbalance noted with trial without AD; widened NATY and increased need to counterbalance with arms. No balance deficits with use of WW.   Sensory Examination   Sensory Perception Comments Chronic; unchanged   Clinical Impression   Criteria for Skilled Therapeutic Intervention Yes, treatment indicated   PT Diagnosis (PT) Impaired functional mobility   Influenced by the following impairments decreased sensation, weakness, B foot drop L>R, labrum tear L him per pt., occasional dizziness (not during session)   Functional  limitations due to impairments decreased endurance, increased need for AD   Clinical Presentation (PT Evaluation Complexity) stable   Clinical Presentation Rationale clinical judgement   Clinical Decision Making (Complexity) moderate complexity   Planned Therapy Interventions (PT) balance training;bed mobility training;gait training;home exercise program;neuromuscular re-education;patient/family education;postural re-education;strengthening;transfer training;progressive activity/exercise;home program guidelines   Risk & Benefits of therapy have been explained evaluation/treatment results reviewed;care plan/treatment goals reviewed;risks/benefits reviewed;current/potential barriers reviewed;participants voiced agreement with care plan;participants included;patient   PT Total Evaluation Time   PT Eval, Low Complexity Minutes (11494) 10   Physical Therapy Goals   PT Frequency Daily   PT Predicted Duration/Target Date for Goal Attainment 06/19/25   PT: Bed Mobility Independent;Supine to/from sit;Rolling  (with HOB flat)   PT: Transfers Modified independent;Sit to/from stand;Bed to/from chair;Assistive device   PT: Gait Modified independent;Rolling walker;Greater than 200 feet   Therapeutic Activity   Therapeutic Activities: dynamic activities to improve functional performance Minutes (64286) 12   Symptoms Noted During/After Treatment None   Treatment Detail/Skilled Intervention Patient long sitting and leaning forward on thighs. Able to move to EOB I'lly. Will assess for bed flat mobility at a later date. Walker adjusted for better fit; too high for pt. STS into WW with hands on WW (this is what she does at home); stood with SBA. Moved into recliner at end of session with SBA as well. Denied dizziness. At end of session, /80, HR 92 and O2 sats 99% on RA. Chair alarm on. Discussed having SBA still d/t being SOB and deconditioned from recent baseline. Back to needs a WW. Discussed recommendations for staff to walk  with pt. in halls. Pt. in agreement. Nsg. notified. Pt. requesting OT as well; feels they have helped with energy conservation techniques during dressing, bathing, and sink ADL's; nsg. is requesting order.   Gait Training   Gait Training Minutes (78397) 12   Symptoms Noted During/After Treatment (Gait Training) fatigue;shortness of breath   Treatment Detail/Skilled Intervention Patient ambulated 170' with WW and CGA to SBA. Trial 15' without AD but wider NATY and U/E compensation for balance; not ready for gait without AD. Pt. in agreement.   PT Discharge Planning   PT Plan assess bed flat transfers, repeated STSs, gait with WW; progressing to no AD, standing endurance activities/exercises   PT Discharge Recommendation (DC Rec) home with home care physical therapy   PT Rationale for DC Rec Patient is mobilizing with SBA using WW; not as steady or strong to ambulate without AD at this time. Anticipate by time of discharge patient will be modified I with her WW and be able to function in apartment setting. Recommend home PT to see and progress strength and functional mobility/endurance   PT Brief overview of current status SBA of 1 with WW. Goals of therapy will be to address safe mobility and make recs for d/c to next level of care. Pt and RN will continue to follow all falls risk precautions as documented by RN staff while hospitalized   PT Total Distance Amb During Session (feet) 185   Physical Therapy Time and Intention   Timed Code Treatment Minutes 24   Total Session Time (sum of timed and untimed services) 34

## 2025-06-13 NOTE — ANESTHESIA POSTPROCEDURE EVALUATION
Patient: Nevin Alvarado    Procedure: Procedure(s):  Anesthesia out of OR MRI of the thoracic spine with contrast       Anesthesia Type:  General    Note:  Disposition: Inpatient   Postop Pain Control: Uneventful            Sign Out: Well controlled pain   PONV: No   Neuro/Psych: Uneventful            Sign Out: Acceptable/Baseline neuro status   Airway/Respiratory: Uneventful            Sign Out: Acceptable/Baseline resp. status   CV/Hemodynamics: Uneventful            Sign Out: Acceptable CV status   Other NRE: NONE   DID A NON-ROUTINE EVENT OCCUR? No           Last vitals:  Vitals Value Taken Time   /75 06/12/25 19:00   Temp     Pulse 95 06/12/25 19:00   Resp 18 06/12/25 19:00   SpO2 98 % 06/12/25 19:00       Electronically Signed By: Siddharth Holland MD  June 12, 2025  8:48 PM

## 2025-06-13 NOTE — PLAN OF CARE
Goal Outcome Evaluation:      Plan of Care Reviewed With: patient    Overall Patient Progress: improvingOverall Patient Progress: improving      A&Ox4, anxious. VSS on RA. Denies pain. PIV SL. Tolerating regular diet. Void adequately. Active BS, +BM x2 this shift. Up SBA w/ walker. Baseline N/T. Pt went under Anesthesia out of OR MRI of the thoracic spine with contrast done. Pt want to do CHG wips right before go to preop. Plan for PICC placement & LP today.

## 2025-06-13 NOTE — PROGRESS NOTES
RADIOLOGY PROCEDURE NOTE  Patient name: Nevin Alvarado  MRN: [id number]  : 1990    Pre-procedure diagnosis: Headaches  Post-procedure diagnosis: Same    Procedure Date/Time: 2025  11:43 AM  Procedure: LP at L3-L4  Estimated blood loss: None  Specimen(s) collected with description: 10ml of clear CSF  The patient tolerated the procedure well with no immediate complications.  Significant findings:OP of 26 cm of water    See imaging dictation for procedural details.    Provider name: Osito Pena PA-C  Assistant(s):None

## 2025-06-13 NOTE — PROGRESS NOTES
Neurology Progress Note        Nevin Zabala MD   06/13/2025        Interval History:      MRIs and LP completed as below - patient reports no change                       Physical Exam:       , Blood pressure (!) 140/75, pulse 94, temperature 98.5  F (36.9  C), temperature source Oral, resp. rate 18, SpO2 96%, not currently breastfeeding.  There were no vitals filed for this visit.  Vital Signs with Ranges  Temp:  [98.2  F (36.8  C)-98.5  F (36.9  C)] 98.5  F (36.9  C)  Pulse:  [] 94  Resp:  [18-22] 18  BP: (117-158)/(75-90) 140/75  SpO2:  [95 %-99 %] 96 %  I/O's Last 24 hours  I/O last 3 completed shifts:  In: 750 [P.O.:250; I.V.:500]  Out: -   Cardio - Heart Rate Reg, Distal pulses palpable  Resp - Breathing non labored    Neurological Exam:  General: Mental status -Alert and orientated, speech without dysarthria, language fluent with intact naming and repetition  Cranial Nerves-  Pupils equal round and reactive to light. Extraocular movements intact. No nystagmus.  Face symmetric and intact to light touch, tongue midline, palate activates symmetrically. Shoulder shrug 5/5  Motor: Normal muscle bulk and tone.  Has 5/5 strength in bilateral upper. Lowers - hip flexion 5- on right, 4+ on left , knee ext 5- on right, 4+ on left,  ADF 5- on rght, < 3 on left,  plantarflexion 5/5  Sensation: intact to light touch and pinprick/vibration  in right leg, absent vibration in left great toe and at knee , decreased pinprick in left leg   Reflexes:  patella 1/1, ankles 0/0, toes downgoing  Cerebellar: intact FNF  Gait: Deferred              Medications:        Current Facility-Administered Medications   Medication Dose Route Frequency Provider Last Rate Last Admin    amitriptyline (ELAVIL) tablet 50 mg  50 mg Oral At Bedtime Toya Powell PA-C   50 mg at 06/12/25 2118    ammonium lactate (LAC-HYDRIN) 12 % lotion   Topical BID Alma Vaughan DO   Given at 06/13/25 0843    [Held by  provider] apixaban ANTICOAGULANT (ELIQUIS) tablet 5 mg  5 mg Oral BID Toya Powell PA-C   5 mg at 06/11/25 0842    busPIRone (BUSPAR) tablet 15 mg  15 mg Oral BID Toya Powell PA-C   15 mg at 06/13/25 0838    cetirizine (zyrTEC) tablet 10 mg  10 mg Oral BID Toya Powell PA-C   10 mg at 06/13/25 0838    hydroxychloroquine (PLAQUENIL) tablet 200 mg  200 mg Oral BID Toya Powell PA-C   200 mg at 06/13/25 0838    lidocaine (PF) (XYLOCAINE) 1 % injection 30 mL  30 mL Intradermal Once Nevin Gamble MD        norethindrone (AYGESTIN) tablet 5 mg  5 mg Oral Daily Toya Powlel PA-C   5 mg at 06/13/25 0839    pantoprazole (PROTONIX) EC tablet 40 mg  40 mg Oral Daily Toya Powell PA-C   40 mg at 06/13/25 0838    polyethylene glycol (MIRALAX) Packet 17 g  17 g Oral TID Keyonna Caban PA-C   17 g at 06/13/25 0839    predniSONE (DELTASONE) tablet 60 mg  60 mg Oral Daily Nevin Gamble MD   60 mg at 06/13/25 0838    pregabalin (LYRICA) capsule 100 mg  100 mg Oral QAM Toya Powell PA-C   100 mg at 06/13/25 0838    pregabalin (LYRICA) capsule 100 mg  100 mg Oral Daily with lunch Toya Powell PA-C   100 mg at 06/12/25 1144    pregabalin (LYRICA) capsule 200 mg  200 mg Oral QPM Toya Powell PA-C   200 mg at 06/12/25 2119    senna-docusate (SENOKOT-S/PERICOLACE) 8.6-50 MG per tablet 1 tablet  1 tablet Oral BID Toya Powell PA-C   1 tablet at 06/13/25 0838    Or    senna-docusate (SENOKOT-S/PERICOLACE) 8.6-50 MG per tablet 2 tablet  2 tablet Oral BID Toya Powell PA-C   2 tablet at 06/12/25 2118    sodium chloride (PF) 0.9% PF flush 10 mL  10 mL Intravenous Once Nevin Gamble MD        sodium chloride (PF) 0.9% PF flush 3 mL  3 mL Intracatheter Q8H TOSHIA Toya Powell PA-C   3 mL at 06/13/25 0707     PRN Meds:   Current Facility-Administered Medications   Medication Dose Route  Frequency Provider Last Rate Last Admin    acetaminophen (TYLENOL) tablet 650 mg  650 mg Oral Q4H PRN Toya Powell PA-C   650 mg at 06/11/25 1135    Or    acetaminophen (TYLENOL) Suppository 650 mg  650 mg Rectal Q4H PRN Toya Powell PA-C        albuterol (PROVENTIL) neb solution 2.5 mg  2.5 mg Nebulization Q6H PRN Toya Powell PA-C        alum & mag hydroxide-simethicone (MAALOX) suspension 30 mL  30 mL Oral Q4H PRN Toya Powell PA-C        bisacodyl (DULCOLAX) suppository 10 mg  10 mg Rectal Daily PRN Toya Powell PA-C        calcium carbonate (TUMS) chewable tablet 1,000 mg  1,000 mg Oral 4x Daily PRN Toya Powell PA-C        clonazePAM (klonoPIN) tablet 0.5 mg  0.5 mg Oral Daily PRN Toya Powell PA-C        cyclobenzaprine (FLEXERIL) tablet 5 mg  5 mg Oral TID PRN Toya Powell PA-C   5 mg at 06/11/25 0438    hydrOXYzine HCl (ATARAX) tablet 25 mg  25 mg Oral Q8H PRN Toya Powell PA-C   25 mg at 06/11/25 0438    ketorolac (TORADOL) injection 15 mg  15 mg Intravenous Q6H PRN Toay Powell PA-C   15 mg at 06/10/25 1444    lidocaine 1 % 0.1-1 mL  0.1-1 mL Other Q1H PRN Toya Powell PA-C        LORazepam (ATIVAN) injection 1 mg  1 mg Intravenous Once PRN Nevin Gamble MD        magnesium citrate solution 148 mL  148 mL Oral Once PRN Alma Vaughan DO        ondansetron (ZOFRAN ODT) ODT tab 4 mg  4 mg Oral Q6H PRN Toya Powell PA-C   4 mg at 06/12/25 0736    Or    ondansetron (ZOFRAN) injection 4 mg  4 mg Intravenous Q6H PRN Toya Powell PA-C        Patient is already receiving anticoagulation with heparin, enoxaparin (LOVENOX), warfarin (COUMADIN)  or other anticoagulant medication   Does not apply Continuous PRN Toya Powell PA-C        senna-docusate (SENOKOT-S/PERICOLACE) 8.6-50 MG per tablet 1 tablet  1 tablet Oral BID PRN  Toya Powell PA-C        Or    senna-docusate (SENOKOT-S/PERICOLACE) 8.6-50 MG per tablet 2 tablet  2 tablet Oral BID PRN Toya Powell PA-C        sodium chloride (PF) 0.9% PF flush 3 mL  3 mL Intracatheter q1 min prn Toya Powell PA-C                Data:      All new lab and imaging data was reviewed.       CSF 6/13/2025 -   WBC 0, RBC 2, Glucose 108, protein 58.9,  meningitis panel neg   O band pending -     6/13/2025 - CRP wnl,   Phos 1.7   INR 1.07    Results for orders placed or performed during the hospital encounter of 06/10/25   XR Abdomen 2 Views    Narrative    EXAM: XR ABDOMEN 2 VIEWS  LOCATION: [facility]  DATE: 6/10/2025    INDICATION: eval stool burden, no BM X7 days, not passing flatus. Ensure no obstruction  COMPARISON: 5/20/2025.      Impression    IMPRESSION: Nonobstructive bowel gas pattern. Moderate stool burden. No radiographic evidence of pneumoperitoneum. Cholecystectomy clips.   MR Brain w/o & w Contrast    Narrative    EXAM: MR BRAIN W/O and W CONTRAST  LOCATION: [facility]  DATE: 6/12/2025    INDICATION: Worsening headache, leg weakness  COMPARISON: 1/7/2025  CONTRAST: 10mL Gadavist  TECHNIQUE: Routine multiplanar multisequence head MRI without and with intravenous contrast.    FINDINGS:  INTRACRANIAL CONTENTS: No restricted diffusion. No mass, acute hemorrhage, or extra-axial fluid collections. Scattered nonspecific T2/FLAIR hyperintensities within the cerebral white matter most consistent with mild chronic microvascular ischemic change.   Normal ventricles and sulci. Normal position of the cerebellar tonsils.      Left frontal developmental venous anomaly incidentally noted.    SELLA: Partially empty sella.    OSSEOUS STRUCTURES/SOFT TISSUES: Normal marrow signal. The major intracranial vascular flow voids are maintained.     ORBITS: No abnormality accounting for technique.     SINUSES/MASTOIDS: No  paranasal sinus mucosal disease. No middle ear or mastoid effusion.       Impression    IMPRESSION:  1.  No restricted diffusion to suggest acute ischemia. White matter changes in the brain parenchyma are nonspecific but could be seen in the setting of migrainous symptoms. The differential considerations are broad to include advanced chronic small   vessel ischemic changes as well. No abnormal enhancement on postcontrast imaging.   MR Cervical Spine w/o & w Contrast    Narrative    EXAM: MR CERVICAL SPINE W/O and W CONTRAST  LOCATION: [facility]  DATE: 6/12/2025    INDICATION: worsening leg weakness  COMPARISON: None.  CONTRAST: 10mL Gadavist  TECHNIQUE: MRI Cervical Spine without and with IV contrast.    FINDINGS:   Vertebral body heights are maintained. Marrow signal unremarkable. Disc heights are preserved. No anterolisthesis or retrolisthesis. No significant STIR edema within the vertebral column. No cord signal abnormality. The visualized posterior fossa is   unremarkable.    C2-3: Negative.    C3-4: Negative.    C4-5: Negative.    C5-6: Broad-based disc osteophyte complex without cord compression. No foraminal narrowing.    C6-7 and C7-T1 are without cord compression or foraminal narrowing.    Limited assessment of the soft tissues of the neck are unremarkable. No cord signal abnormality or abnormal cord enhancement. No abnormal vertebral body enhancement.      Impression    IMPRESSION:  1.  No cord signal abnormality or abnormal cord enhancement.     MR Thoracic Spine w/o & w Contrast    Narrative    EXAM: MR THORACIC SPINE W/O and W CONTRAST  LOCATION: [facility]  DATE: 6/12/2025    INDICATION: worsening leg weakness  COMPARISON: None.  CONTRAST: 10mL Gadavist  TECHNIQUE: Routine Thoracic Spine MRI without and with IV contrast.    FINDINGS:   Vertebral body heights are maintained. Marrow signal unremarkable. Mild loss of disc height at T5-6 with a  broad-based disc osteophyte complex with partial effacement of the ventral subarachnoid space. Mild contacting of the ventral cord as a result.    The remainder of the thoracic levels are without significant cord compression or high-grade foraminal narrowing. No abnormal enhancement on postcontrast imaging.    No significant STIR edema within the vertebral column. Mild dextrocurvature of the upper thoracic spine with some compensatory levocurvature of the lower thoracic levels.      Impression     IMPRESSION:  1.  No cord signal abnormality or abnormal cord enhancement identified. Other findings as above.   MR Lumbar Spine w/o & w Contrast    Narrative    EXAM: MR LUMBAR SPINE W/O and W CONTRAST  LOCATION: [facility]  DATE: 6/12/2025    INDICATION: CIDP hx, worsening leg weakness  COMPARISON: None.  CONTRAST: 10mL Gadavist  TECHNIQUE: Routine Lumbar Spine MRI without and with IV contrast.    FINDINGS:   Vertebral body heights are maintained. Mild loss of disc height at L5-S1. No significant STIR edema within the vertebral bodies of the lumbar spine. There is extensive thickening of the nerve roots as they exit the neural foraminal distributions and   there is overall thickening of the descending nerve roots in the lumbar levels. No clear evidence of significant abnormal enhancement in the descending or exiting nerve roots.    L1-2: Negative.    L2-3: No canal stenosis or significant foraminal narrowing.    L3-4 demonstrates no canal stenosis. Mild facet disease. No significant foraminal narrowing.    L4-5: Broad-based disc bulge without canal stenosis. Facet disease without significant foraminal narrowing.    L5-S1 demonstrates no canal stenosis or significant foraminal narrowing.      Impression    IMPRESSION:  1.  There is diffuse thickening of the nerve roots in the descending portions of the nerve roots in the lumbar levels with significant prominence to the exiting nerve roots as well.  Overall findings are consistent with the given diagnostic history of   CIDP. No significant abnormal enhancement identified.   XR Lumbar Puncture Spinal Tap Diagnostic    Narrative    EXAM:  1. DIAGNOSTIC LUMBAR PUNCTURE  2. FLUOROSCOPIC GUIDANCE  LOCATION: [facility]  DATE: 6/13/2025    INDICATION: CIDP concern for flare. Headache.    RADIATION DOSE: Total Air Kerma 25 mGy    PROCEDURE: Procedure and risks explained to patient and consent received. The patient was placed in prone position, and the lower back was prepped and draped in sterile fashion. 1% local lidocaine infused in local soft tissues.     Under direct fluoroscopic guidance, a 5 inch 22 gauge spinal needle was placed into the spinal canal at the L3-L4 level. Opening pressure of 26 cm of water.    SPECIMEN: 10 mL of clear CSF sent to lab.    COMPLICATIONS: None.      Impression    IMPRESSION: Fluoroscopically guided lumbar puncture.                    *Note: Due to a large number of results and/or encounters for the requested time period, some results have not been displayed. A complete set of results can be found in Results Review.           Assessment and Plan:          Hx of CIDP, worsening leg strength, possible flare.  MRI imaging reassuring with no acute CNS process, does have T2 brain changes but stable -  LP reassuring with only slight protein elevation - plan to restart IVIG for CIDP flare     Headaches: tension in description and could be cervicogenic, no migrainous quality.       RECOMMENDATIONS     - Start IVIG 0.4g/kg x 5 days      - Plan PICC for access, will need to discuss port for long term access ( will review options with hospital team), in discussion with charge will need to come to neuro floor   - PT/OT   - Headaches - continue Lyrica, amitriptyline, okay to continue prednisone 60mg x 5 days,     Neuro will continue to follow            46  min spent on review of chart, exam, care coordination, and  documentation on date of service

## 2025-06-13 NOTE — PROGRESS NOTES
On call Dr. Hess paged- Request for PICC line orders per neurology to receive IVIG. Transfer order placed to Neuro station 73.

## 2025-06-13 NOTE — CONSULTS
"BRIEF NUTRITION ASSESSMENT      REASON FOR ASSESSMENT:  Nevin Alvarado is a 34 year old female seen by Registered Dietitian for Provider Order - Supplement     NUTRITION HISTORY:  Patient typically eats BID meals. States she has an egg sandwich with [employer] [place] chan for breakfast and either chicken or fish for dinner. She is followed by an OP RD who has recommended ONS, used to drink Ensure however recently noted they were a \"bad case,\" now drinking Fairlife 2-3 times daily.     CURRENT DIET AND INTAKE:  Diet: Regular  Snacks/Supplements: Ivonne [employer] Standard 1.0 at BLD    CURRENT INTAKE/TOLERANCE  50-75% of TID meals per I/O flowsheets, per [employer] [facility] room service meal tray ticket review, and per pt and/or family reports when diet allows.   Patient requesting supplements while admitted, doesn't feel comfortable drinking Ensure d/t the \"bad case\" she bought. Will trial [employer].     ANTHROPOMETRICS:  Height: 1.575 m (5' 2\")  Admission Weight:     Most Recent Weight:    IBW: 50 kg  %IBW: AMMON d/t lack of admitted anthropometrics  There is no height or weight on file to calculate BMI.   Weight History: No significant weight loss noted  Wt Readings from Last 10 Encounters:   06/09/25 102.5 kg (226 lb)   06/01/25 100.7 kg (222 lb)   05/28/25 100.7 kg (222 lb)   05/14/25 102.1 kg (225 lb)   05/07/25 103.6 kg (228 lb 6.3 oz)   04/14/25 102.5 kg (226 lb)   03/05/25 103.4 kg (228 lb)   02/27/25 103.4 kg (228 lb)   02/18/25 103.4 kg (228 lb)   01/22/25 104.3 kg (230 lb)       LABS:  Labs noted    MALNUTRITION:  Patient does not meet two of the following criteria necessary for diagnosing malnutrition.     % Weight Loss:  None noted  % Intake:  No decreased intake noted  Subcutaneous Fat Loss:  None observed  Muscle Loss:  None observed  Fluid Retention:  None noted    NUTRITION INTERVENTION:  Nutrition Diagnosis:  No nutrition diagnosis at this time.    Implementation:  Encouraged protein at meal times and continuing " supplement intakes.     FOLLOW UP/MONITORING:   Will re-evaluate in 7 - 10 days, or sooner, if re-consulted.      Juan Pena RD, LD, MS  Float Coverage  Available on [employer]

## 2025-06-14 ENCOUNTER — APPOINTMENT (OUTPATIENT)
Dept: PHYSICAL THERAPY | Facility: CLINIC | Age: 35
DRG: 074 | End: 2025-06-14
Payer: COMMERCIAL

## 2025-06-14 ENCOUNTER — APPOINTMENT (OUTPATIENT)
Dept: OCCUPATIONAL THERAPY | Facility: CLINIC | Age: 35
DRG: 074 | End: 2025-06-14
Attending: INTERNAL MEDICINE
Payer: COMMERCIAL

## 2025-06-14 LAB — PHOSPHATE SERPL-MCNC: 3 MG/DL (ref 2.5–4.5)

## 2025-06-14 PROCEDURE — 120N000013 HC R&B IMCU

## 2025-06-14 PROCEDURE — 250N000013 HC RX MED GY IP 250 OP 250 PS 637: Performed by: PHYSICIAN ASSISTANT

## 2025-06-14 PROCEDURE — 97110 THERAPEUTIC EXERCISES: CPT | Mod: GP | Performed by: PHYSICAL THERAPIST

## 2025-06-14 PROCEDURE — 250N000013 HC RX MED GY IP 250 OP 250 PS 637: Performed by: PSYCHIATRY & NEUROLOGY

## 2025-06-14 PROCEDURE — 250N000011 HC RX IP 250 OP 636: Mod: JZ | Performed by: STUDENT IN AN ORGANIZED HEALTH CARE EDUCATION/TRAINING PROGRAM

## 2025-06-14 PROCEDURE — 250N000012 HC RX MED GY IP 250 OP 636 PS 637: Performed by: STUDENT IN AN ORGANIZED HEALTH CARE EDUCATION/TRAINING PROGRAM

## 2025-06-14 PROCEDURE — 30233S1 TRANSFUSION OF NONAUTOLOGOUS GLOBULIN INTO PERIPHERAL VEIN, PERCUTANEOUS APPROACH: ICD-10-PCS | Performed by: PSYCHIATRY & NEUROLOGY

## 2025-06-14 PROCEDURE — 97530 THERAPEUTIC ACTIVITIES: CPT | Mod: GO

## 2025-06-14 PROCEDURE — 999N000040 HC STATISTIC CONSULT NO CHARGE VASC ACCESS

## 2025-06-14 PROCEDURE — 97165 OT EVAL LOW COMPLEX 30 MIN: CPT | Mod: GO

## 2025-06-14 PROCEDURE — 99233 SBSQ HOSP IP/OBS HIGH 50: CPT | Performed by: INTERNAL MEDICINE

## 2025-06-14 PROCEDURE — 97116 GAIT TRAINING THERAPY: CPT | Mod: GP | Performed by: PHYSICAL THERAPIST

## 2025-06-14 PROCEDURE — 250N000013 HC RX MED GY IP 250 OP 250 PS 637

## 2025-06-14 PROCEDURE — 97535 SELF CARE MNGMENT TRAINING: CPT | Mod: GO

## 2025-06-14 PROCEDURE — 250N000011 HC RX IP 250 OP 636: Performed by: PSYCHIATRY & NEUROLOGY

## 2025-06-14 PROCEDURE — 250N000011 HC RX IP 250 OP 636: Mod: JZ | Performed by: PHYSICIAN ASSISTANT

## 2025-06-14 RX ORDER — ONDANSETRON 2 MG/ML
8 INJECTION INTRAMUSCULAR; INTRAVENOUS EVERY 8 HOURS PRN
Status: DISCONTINUED | OUTPATIENT
Start: 2025-06-14 | End: 2025-06-19 | Stop reason: HOSPADM

## 2025-06-14 RX ORDER — HUMAN IMMUNOGLOBULIN G 20 G/200ML
0.4 LIQUID INTRAVENOUS EVERY 24 HOURS
Status: COMPLETED | OUTPATIENT
Start: 2025-06-14 | End: 2025-06-18

## 2025-06-14 RX ORDER — NALOXONE HYDROCHLORIDE 0.4 MG/ML
0.4 INJECTION, SOLUTION INTRAMUSCULAR; INTRAVENOUS; SUBCUTANEOUS
Status: DISCONTINUED | OUTPATIENT
Start: 2025-06-14 | End: 2025-06-19 | Stop reason: HOSPADM

## 2025-06-14 RX ORDER — NALOXONE HYDROCHLORIDE 0.4 MG/ML
0.2 INJECTION, SOLUTION INTRAMUSCULAR; INTRAVENOUS; SUBCUTANEOUS
Status: DISCONTINUED | OUTPATIENT
Start: 2025-06-14 | End: 2025-06-19 | Stop reason: HOSPADM

## 2025-06-14 RX ORDER — MAGNESIUM SULFATE HEPTAHYDRATE 40 MG/ML
2 INJECTION, SOLUTION INTRAVENOUS
Status: DISCONTINUED | OUTPATIENT
Start: 2025-06-14 | End: 2025-06-15

## 2025-06-14 RX ADMIN — CETIRIZINE HYDROCHLORIDE 10 MG: 10 TABLET, FILM COATED ORAL at 08:22

## 2025-06-14 RX ADMIN — ACETAMINOPHEN 650 MG: 325 TABLET, FILM COATED ORAL at 08:23

## 2025-06-14 RX ADMIN — PREGABALIN 200 MG: 100 CAPSULE ORAL at 20:01

## 2025-06-14 RX ADMIN — NORETHINDRONE ACETATE 5 MG: 5 TABLET ORAL at 08:22

## 2025-06-14 RX ADMIN — POLYETHYLENE GLYCOL 3350 17 G: 17 POWDER, FOR SOLUTION ORAL at 08:21

## 2025-06-14 RX ADMIN — SENNOSIDES AND DOCUSATE SODIUM 1 TABLET: 50; 8.6 TABLET ORAL at 20:01

## 2025-06-14 RX ADMIN — PANTOPRAZOLE SODIUM 40 MG: 40 TABLET, DELAYED RELEASE ORAL at 08:22

## 2025-06-14 RX ADMIN — POLYETHYLENE GLYCOL 3350 17 G: 17 POWDER, FOR SOLUTION ORAL at 16:39

## 2025-06-14 RX ADMIN — MAGNESIUM SULFATE HEPTAHYDRATE 2 G: 40 INJECTION, SOLUTION INTRAVENOUS at 12:34

## 2025-06-14 RX ADMIN — PREGABALIN 100 MG: 100 CAPSULE ORAL at 08:22

## 2025-06-14 RX ADMIN — SENNOSIDES AND DOCUSATE SODIUM 1 TABLET: 50; 8.6 TABLET ORAL at 08:22

## 2025-06-14 RX ADMIN — HUMAN IMMUNOGLOBULIN G 20 G: 20 LIQUID INTRAVENOUS at 22:08

## 2025-06-14 RX ADMIN — HYDROXYCHLOROQUINE SULFATE 200 MG: 200 TABLET, FILM COATED ORAL at 20:01

## 2025-06-14 RX ADMIN — HYDROXYCHLOROQUINE SULFATE 200 MG: 200 TABLET, FILM COATED ORAL at 08:22

## 2025-06-14 RX ADMIN — DIPHENHYDRAMINE HYDROCHLORIDE 50 MG: 50 INJECTION, SOLUTION INTRAMUSCULAR; INTRAVENOUS at 21:42

## 2025-06-14 RX ADMIN — PREDNISONE 60 MG: 20 TABLET ORAL at 08:22

## 2025-06-14 RX ADMIN — ACETAMINOPHEN 650 MG: 325 TABLET, FILM COATED ORAL at 21:41

## 2025-06-14 RX ADMIN — PREGABALIN 100 MG: 100 CAPSULE ORAL at 12:33

## 2025-06-14 RX ADMIN — KETOROLAC TROMETHAMINE 15 MG: 15 INJECTION, SOLUTION INTRAMUSCULAR; INTRAVENOUS at 10:06

## 2025-06-14 RX ADMIN — Medication: at 22:08

## 2025-06-14 RX ADMIN — CETIRIZINE HYDROCHLORIDE 10 MG: 10 TABLET, FILM COATED ORAL at 20:02

## 2025-06-14 RX ADMIN — AMITRIPTYLINE HYDROCHLORIDE 75 MG: 25 TABLET, FILM COATED ORAL at 21:41

## 2025-06-14 RX ADMIN — BUSPIRONE HYDROCHLORIDE 15 MG: 15 TABLET ORAL at 08:22

## 2025-06-14 RX ADMIN — BUSPIRONE HYDROCHLORIDE 15 MG: 15 TABLET ORAL at 20:02

## 2025-06-14 ASSESSMENT — ACTIVITIES OF DAILY LIVING (ADL)
ADLS_ACUITY_SCORE: 65

## 2025-06-14 NOTE — PLAN OF CARE
Goal Outcome Evaluation:    Shift Summary [id number], pt. Transferred ~2300    Admitting Diagnosis: Chronic inflammatory demyelinating polyneuropathy (H) [G61.81]  Leg weakness, bilateral [R29.898]   Vitals elevated BP  Pain 5/10. Taking tylenol & flexeril PRN.   A&Ox4, anxious  Voiding adequately up to BR  Mobility SBA with walker  Tele N/A  CMS numbness & tingling in upper extremities, baseline for pt.  Lung Sounds clear on RA   GI WNL, multiple formed small stools today, passing gas  Skin WNL, dressing in place for attempted PIC placement    Orders Placed This Encounter      Regular Diet Adult       PICC placement unsuccessful, canceled IVIG infusion tonight, pt. Spoke with neurologist over the phone about PICC/port. PIV SL. Phosphorus replaced, AM check. Transferred to Neuro unit.

## 2025-06-14 NOTE — PROGRESS NOTES
06/14/25 1211   Appointment Info   Signing Clinician's Name / Credentials (OT) Rhina Rutherford OTR/L   Appointment Cancel Comments (OT) Initiated OT eval, multiple providers entering room.   Rehab Comments (OT) Completed eval + treat in PM.   Living Environment   People in Home alone   Current Living Arrangements apartment   Home Accessibility no concerns   Transportation Anticipated other (see comments)  (has either hc workers that A or use [employer])   Living Environment Comments Pt lives alone in apt, elevator access. Pt has tub shower w/ chair and handheld showerhead, reports chair does not fit well in narrow tub. Pt reports plan to renovate BR to widen tub, add grab bars by toilet and shower.   Self-Care   Usual Activity Tolerance moderate   Current Activity Tolerance moderate   Equipment Currently Used at Home walker, rolling   Fall history within last six months yes   Number of times patient has fallen within last six months 2   Activity/Exercise/Self-Care Comment Pt reports ind w/ ADLs at baseline. Pt reports inc difficulty standing longer periods of time to complete ADLs (i.e g/h at sink). Varies between using a WW and not. Reports she is a lot better than a year ago. Has worked hard in OT and PT to get to point of not using WW. Currently needing one though.   Instrumental Activities of Daily Living (IADL)   IADL Comments Pt reports has a license but not a car; has health care transport. Has 15 hours of IHS care per week. They assist with household chores and taking her to get groceries, sometimes appointments. Pt reports ind cooking, cleaning but w/ inc difficulty   General Information   Onset of Illness/Injury or Date of Surgery 06/10/25   Referring Physician Deanna Severino MD   Patient/Family Therapy Goal Statement (OT) To maintain independence, improve mobility and I/ADL ind   Additional Occupational Profile Info/Pertinent History of Current Problem Per chart: Nevin Alvarado is a 34 year old  female admitted on 6/10/2025. She initially presented 6/9/25 for worsening leg pain, weakness, and falls. Given her hx of chronic inflammatory demyelinating polyneuropathy, her case was discussed with Neurology in the ER and they recommended admission overnight so she could be evaluated by Neurology in person in the morning however due to an appointment 6/10, she ultimately requested discharge with strict return precautions. She presents again 6/10 for evaluation of lower extremity weakness, neuropathic pain as well as midline cervical, thoracic, and lumbar back pain. She also reports severe headaches. Worsening neuropathy with falls Hx of chronic inflammatory demyelinating polyneuropathy   Existing Precautions/Restrictions fall   Cognitive Status Examination   Orientation Status orientation to person, place and time   Affect/Mental Status (Cognitive) WFL   Follows Commands WFL   Visual Perception   Visual Impairment/Limitations corrective lenses full-time   Sensory   Sensory Comments Pt w/ chronic inflammatory demyelinating polyneuropathy   Pain Assessment   Patient Currently in Pain No   Range of Motion Comprehensive   Comment, General Range of Motion Not formally assessed, BUE WFL w/ functional tasks   Strength Comprehensive (MMT)   Comment, General Manual Muscle Testing (MMT) Assessment Not formally assessed, BUE WFL w/ functional tasks. Pt reports BUE near baseline strength   Bed Mobility   Comment (Bed Mobility) NT - pt in chair upon arrival   Transfers   Transfers sit-stand transfer   Sit-Stand Transfer   Sit-Stand [place] (Transfers) supervision   Balance   Balance Comments Not formally assessed, no LOB noted during session. Pt reports baseline balance difficulties when bending down/grabbing items low to ground, leading to falls   Activities of Daily Living   BADL Assessment/Intervention lower body dressing;grooming;toileting   Lower Body Dressing Assessment/Training   [place] Level (Lower Body  Dressing) independent   Grooming Assessment/Training   [place] Level (Grooming) supervision   Comment, (Grooming) Per clinical judgement   Toileting   [place] Level (Toileting) supervision   Comment, (Toileting) Per clinical judgement   Clinical Impression   Criteria for Skilled Therapeutic Interventions Met (OT) Yes, treatment indicated   OT Diagnosis Impaired I/ADL independence   OT Problem List-Impairments impacting ADL problems related to;activity tolerance impaired;mobility;strength   Assessment of Occupational Performance 3-5 Performance Deficits   Identified Performance Deficits toileting, g/h, IADLs, functional mobility   Planned Therapy Interventions (OT) ADL retraining;IADL retraining;strengthening;transfer training;home program guidelines;progressive activity/exercise;risk factor education   Clinical Decision Making Complexity (OT) problem focused assessment/low complexity   Risk & Benefits of therapy have been explained evaluation/treatment results reviewed;care plan/treatment goals reviewed;risks/benefits reviewed;current/potential barriers reviewed;participants voiced agreement with care plan;participants included;patient   OT Total Evaluation Time   OT Eval, Low Complexity Minutes (46026) 8   OT Goals   Therapy Frequency (OT) Daily   OT Predicted Duration/Target Date for Goal Attainment 06/21/25   OT Goals Hygiene/Grooming;Lower Body Dressing;Transfers;Toilet Transfer/Toileting;Home Management;OT Goal 1   OT: Hygiene/Grooming independent;while standing   OT: Lower Body Dressing Independent;including set-up/clothing retrieval   OT: Transfer Independent;with assistive device  (Shower tub tx)   OT: Toilet Transfer/Toileting Independent;toilet transfer;cleaning and garment management   OT: Home Management Independent;with light demand household tasks;ambulatory level   OT: Goal 1 Pt will incorporate 3 EC into ADL routine to inc ADL ind and safety upon returning home   Interventions    Interventions Quick Adds Self-Care/Home Management;Therapeutic Activity   Self-Care/Home Management   Self-Care/Home Mgmt/ADL, Compensatory, Meal Prep Minutes (67129) 14   Symptoms Noted During/After Treatment (Meal Preparation/Planning Training) none   Treatment Detail/Skilled Intervention Pt greeted in recliner chair, no alarm, agreeable to OT. Pt w/ questions re: EC, home set up, edu on EC while showering. Edu on HHOT for home safety eval, modify/adapt environment to enhance I/ADL ind, pt in agreement. Pt don shoes ind w/ set up. Pt engaged in therapeutic act (see below). Pt issued EC/WS handouts, edu on methods to inc I/ADL ind. Pt requesting to trial BR ADLs next session as lunch arriving. Pt in recliner at end of session, all needs within reach.   Therapeutic Activities   Therapeutic Activity Minutes (58390) 10   Symptoms noted during/after treatment shortness of breath   Treatment Detail/Skilled Intervention Pt STS w/ FWW SBA. Pt amb in hallway ~175' to progress act douglas for I/ADLs. Pt engaged in conversation, cog tasks to inc difficulty. Pt w/ inc SOB. Pt return to room, transfer to recliner chair. Vitals taken (see flowsheet)   OT Discharge Planning   OT Plan Likely 1 more session? Endurance w/ BR ADLs (g/h, toileting), EC/WS methods, simulate IADLs for inc act douglas, TB dressing w/ retrieval   OT Discharge Recommendation (DC Rec) home with home care occupational therapy   OT Rationale for DC Rec Pt functioning below baseline, limited by dec act douglas, wkns. Pt reports ind w/ ADLs at baseline but w/ inc difficulty and falls. Pt currently Ax1 for inc safety. Pt reports plans to renovate BR to inc ADL ind and safety. Pt would benefit from HHOT for home safety eval, assist modifying/adapting home environment for inc ind, progress act douglas. Pt in agreement for HHOT. IP OT will continue to follow   OT Brief overview of current status Goals of therapy will be to address safe mobility and make recs for d/c to next  level of care. Pt and RN will continue to follow all falls risk precautions as documented by RN staff while hospitalized. Ax1 w/ FWW   OT Total Distance Amb During Session (feet) 175   Total Session Time   Timed Code Treatment Minutes 24   Total Session Time (sum of timed and untimed services) 32

## 2025-06-14 NOTE — PROGRESS NOTES
[facility]    Medicine Progress Note - Hospitalist Service    Date of Admission:  6/10/2025    Assessment & Plan     Nevin Alvarado is a 34 year old female with h/o CIDP who presented on 6/10/2025 with worsening leg pain, weakness, falls, back pain and headaches.        Worsening neuropathy with falls in patient with chronic inflammatory demyelinating polyneuropathy - likely due to CIDP flare up  -CIDP diagnosed in 2015, characterized by the lower extremities. Has been treated with weekly or biweekly IVIG [id number]. She discontinued IVIG due to lack of efficacy, but noted worsening symptoms after stopping IVIG. She was admitted to [facility] in 2024 with flare up, treated with plasma exchange x 7 sessions, with some improvement in strength  -follows with Dr. Chance of [facility], refer to detailed note from 5/15/25. She has appointment for EMG on 6/30 and has been referred to medical genetics.   -she typically experiences lower extremity weakness, L > R and has foot drop, worse on L. Tremors in hands, tingling and numbness in arms and sensitivity to pressure causing numbness from arms to feet.   -recent outpatient work up 5/15 showed   - normal Vit D, B12, Methylmalonic acid 0.14. CRP elevated at 7.6  -negative paraneoplastic autoantibodies  -negative SSA Ro and SSB [place] IgG, negative Glutamic acid decarboxylase Ab  axonal, autoimmune/paraneoplastic panel negative. Peripheral nervous system demyelinating neuropathy, autoimmune eval negative. 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   - Neuro checks per routine  - LP 6/13  - Continue PTA Lyrica (with dose adjustment as below) and PRN Flexeril   - PT consult  - consideration for IVIG, if LP  "suggestive, then plan will be to place a PICC on 6/13 and then follow up outpatient for port placement (IR is unable to accommodate port placement on 6/13--confirmed 6/12 with Chitra)     Cervicogenic headaches with left-sided predominance are secondary to occipital neuralgia: Noted per 5/15 Neurology note. Pt reports headaches have been worsening, described as a pressure in her head, \"worst headache of my life\" that will linger for 24 hours, dissipate, then come back after 12 hours with some associated blurred vision.   *6/1 ED Visit, ativan 1mg, mag sulfate 2g, 1L bolus but did not alleviate her pain.  *6/10 toradol trialed, no improvement.  - Neurology following  - imitrex available PRN.  - PTA lyrica increased with 100mg dose added midday per neurology rec  - 6/11 2g IV mag given and started prednisone 60mg daily for 5 days     Constipation, improved  Hx of thrombosed hemorrhoids: Hx of this, reports no BM X1 week, not passing flatus. Some abdominal pain in lower abdomen radiating to LUQ and epigastric region. Reports that she has trialed colace, miralax, suppositories, manual disimpaction outpatient with no relief. Not on narcotics. ? If related to mounjaro. Recently had a colonoscopy with [facility] which was reported as normal, but was referred to Colorectal Surgery for thrombosed hemorrhoids. No intervention performed as pt is on Eliquis. She has subsequently developed a few more hemorrhoids.   *6/10 KUB moderate stool burden  -Colorectal Surgery consulted for significant constipation, appreciate recommendations, signed off on 6/12  -Senokot BID, Miralax TID, PRN suppository and mag citrate available              -reduce as needed  -*good BM 6/11 midday and overnight, but still feels constipated on 6/12     Atypical chest pain- resolved  Localized to center of the chest, described as a burning. No radiation. Not aggravated with activity. No SOB. Vitals stable. No cardiac hx. ? GERD.   *EKG: sinus  - PRN maalox   " "  Hx of PE (4/2024 and 2019)  - hold PTA eliquis while awaiting LP, resume post procedure 6/13  - intermittent heparin while awaiting LP     Obesity: PTA Adriel has been on hold in the setting of recent outpatient procedures (colonoscopy, endoscopy, knee replacement), pt tried restarting, but became acutely ill with N/V, thus has remained off for now and will work with her outpatient weight management team to discuss resumption.      Fibromyalgia: Follows with [facility]. Has been put on Plaquenil 200 mg BID for unclear rheumatologic process. Defer management to them. Will continue for now.      ZOHRA: Does not tolerate CPAP      Generalized anxiety disorder   Borderline personality disorder   MDD   PTSD  Hx of self injurious behavior: Noted per Psychiatry note 4/2024. Reports stable mental health.   - Resume PTA meds including Buspar, Amitriptyline, PRN Klonopin and Atarax        Diet: Regular Diet Adult    DVT Prophylaxis: DOAC  Hazel Catheter: Not present  Lines: None     Cardiac Monitoring: None  Code Status: Full Code      Clinically Significant Risk Factors                              # Morbid Obesity: Estimated body mass index is 41.34 kg/m  as calculated from the following:    Height as of 6/1/25: 1.575 m (5' 2\").    Weight as of 6/9/25: 102.5 kg (226 lb)., PRESENT ON ADMISSION     # Financial/Environmental Concerns: none         Social Drivers of Health    Depression: At risk (3/12/2025)    Received from [facility]    PHQ-2     PHQ-2 TOTAL SCORE: 3   Financial Resource Strain: Medium Risk (3/2/2025)    Received from [facility]    Financial Resource Strain     Difficulty of Paying Living Expenses: 2     Difficulty of Paying Living Expenses: 1   Transportation Needs: Unmet Transportation Needs (3/2/2025)    Received from [facility]    Transportation Needs     Does lack of transportation keep you from " medical appointments?: 1     Does lack of transportation keep you from work, meetings or getting things that you need?: 2   Physical Activity: Insufficiently Active (11/13/2024)    Received from [facility]    Exercise Vital Sign     Days of Exercise per Week: 3 days     Minutes of Exercise per Session: 10 min          Disposition Plan     Medically Ready for Discharge: Anticipated in 2-4 Days             Deanna Severino MD  Hospitalist Service  [facility]  Securely message with [employer] (more info)  Text page via [employer] Paging/Directory   ______________________________________________________________________    Interval History   Feels ok. Underwent LP today  Waiting for further directions from neurology    Physical Exam   Vital Signs: Temp: 99  F (37.2  C) Temp src: Oral BP: (!) 170/103 Pulse: 100   Resp: 18 SpO2: 98 % O2 Device: None (Room air)    Weight: 0 lbs 0 oz    Constitutional - awake and alert, resting in bed, in no acute distress  Cardiovascular - regular rate and rhythm, no murmurs  Pulmonary - lungs are clear to auscultation bilaterally, no wheezing or rhonchi  Integumentary - skin is warm and dry, no rashes or ulcers  Neurological - awake, alert and oriented x3.  Moving all 4 extremities, normal speech, bilateral foot drop    Medical Decision Making       53 MINUTES SPENT BY ME on the date of service doing chart review, history, exam, documentation & further activities per the note.      Data     I have personally reviewed the following data over the past 24 hrs:    8.3  \   14.0   / 288     140 107 12.7 /  197 (H)   4.4 18 (L) 0.61 \     Procal: N/A CRP: 4.40 Lactic Acid: N/A       INR:  1.07 PTT:  24   D-dimer:  N/A Fibrinogen:  N/A       Imaging results reviewed over the past 24 hrs:   Recent Results (from the past 24 hours)   XR Lumbar Puncture Spinal Tap Diagnostic    Narrative    EXAM:  1. DIAGNOSTIC LUMBAR PUNCTURE  2. FLUOROSCOPIC GUIDANCE  LOCATION: [facility]  [facility]  DATE: 6/13/2025    INDICATION: CIDP concern for flare. Headache.    RADIATION DOSE: Total Air Kerma 25 mGy    PROCEDURE: Procedure and risks explained to patient and consent received. The patient was placed in prone position, and the lower back was prepped and draped in sterile fashion. 1% local lidocaine infused in local soft tissues.     Under direct fluoroscopic guidance, a 5 inch 22 gauge spinal needle was placed into the spinal canal at the L3-L4 level. Opening pressure of 26 cm of water.    SPECIMEN: 10 mL of clear CSF sent to lab.    COMPLICATIONS: None.      Impression    IMPRESSION: Fluoroscopically guided lumbar puncture.

## 2025-06-14 NOTE — PLAN OF CARE
Goal Outcome Evaluation:      Plan of Care Reviewed With: patient    Overall Patient Progress: no changeOverall Patient Progress: no change     Pt here with CIDP flare up. A&O x4. Pt refused neuros and to be DND over night. Baseline numbness and tingling in BLE. VSS on RA. Regular diet, thin liquids. Takes pills whole. Up with SBA and walker. Denies pain. Pt scoring green on the Aggression Stop Light Tool. Plan for IVIG pending PICC or port placement. Discharge pending.

## 2025-06-14 NOTE — PROGRESS NOTES
Attempted to place  PICC line with no success.    Pt's right arm showed no visible veins, and the left had one small basilic which I attempted. Had good blood return but wire would not thread with pt becoming increasingly agitated as tried to advance it but then pt demanded that I stop.    R: IR to place PICC as needs sedation, and/or port that pt is requesting.

## 2025-06-14 NOTE — PLAN OF CARE
Status: Patient admitted on 6/10 with CIDP exacerbation  Vitals: VSS  Neuros: Intact ex generalized weakness, LLE 4/5 with weak dorsi/planter flexion, RLE 4/5, BUE 5/5, numbness on top of left foot  IV: No IV access, old IV removed for leaking at site, attempted 1x, vascular consulted  Labs/Electrolytes: WNL  Resp: Lung sounds clear throughout  Diet: Regular  GI: Large BM today  : Voiding spontaneously  Skin: Intact  Pain: Patient c/o 10/10 headache, Tylenol, Magnesium, Toradol given  Activity: Up with SBA, walker, gaitbelt  Social: Cooperative with cares  Plan: IVIG administration at 2000 today, continue to monitor and follow POC        Goal Outcome Evaluation:      Plan of Care Reviewed With: patient    Overall Patient Progress: no changeOverall Patient Progress: no change

## 2025-06-14 NOTE — PROGRESS NOTES
Pt refusing to have PICC line placed at this time and stating that she wants a port instead for her Immune globulin therapy.    This was discussed with the charge and pt's nurse.

## 2025-06-14 NOTE — PROGRESS NOTES
RECEIVING UNIT ED HANDOFF REVIEW    ED Nurse Handoff Report was reviewed by: Lilian Ambriz RN on June 13, 2025 at 9:18 PM

## 2025-06-14 NOTE — PROGRESS NOTES
Neurology Progress Note        Nevin Zabala MD   06/14/2025        Interval History:       Not able to obtain PICC after several tries with vascular team.  Patient declined further attempts  This am willing to attempt IVIG with IV     Ongoing headache, constipation                   Physical Exam:       , Blood pressure 128/84, pulse 92, temperature 98.5  F (36.9  C), temperature source Oral, resp. rate 16, SpO2 98%, not currently breastfeeding.  There were no vitals filed for this visit.  Vital Signs with Ranges  Temp:  [98.5  F (36.9  C)-99.7  F (37.6  C)] 98.5  F (36.9  C)  Pulse:  [] 92  Resp:  [16-18] 16  BP: (128-171)/() 128/84  SpO2:  [97 %-99 %] 98 %  I/O's Last 24 hours  I/O last 3 completed shifts:  In: 240 [P.O.:240]  Out: 1550 [Urine:1550]    Neurological Exam:  General: Mental status -Alert and orientated, speech without dysarthria, language fluent with intact naming and repetition  Cranial Nerves-  Pupils equal round and reactive to light. Extraocular movements intact. No nystagmus.  Face symmetric and intact to light touch, tongue midline, palate activates symmetrically. Shoulder shrug 5/5  Motor: Normal muscle bulk and tone.  Has 5/5 strength in bilateral upper. Lowers - hip flexion 5 on right, 5- on left , knee ext 5- on right, 4+ on left,  ADF 5 on rght, < 3 on left,  plantarflexion 5/5  Sensation: intact to light touch and pinprick/vibration  in right leg, absent vibration in left great toe and at knee , decreased pinprick in left leg   Reflexes:  patella 1/1, ankles 0/0, toes downgoing  Cerebellar: intact FNF  Gait: Deferred          Medications:        Current Facility-Administered Medications   Medication Dose Route Frequency Provider Last Rate Last Admin    acetaminophen (TYLENOL) tablet 650 mg  650 mg Oral Q24H Nevin Zabala MD        amitriptyline (ELAVIL) tablet 50 mg  50 mg Oral At Bedtime Toya Powell PA-C   50 mg at 06/13/25 2203    ammonium  lactate (LAC-HYDRIN) 12 % lotion   Topical BID Alma Vaughan    Given at 06/13/25 0843    [Held by provider] apixaban ANTICOAGULANT (ELIQUIS) tablet 5 mg  5 mg Oral BID Toya Powell PA-C   5 mg at 06/11/25 0842    busPIRone (BUSPAR) tablet 15 mg  15 mg Oral BID Toya Powell PA-C   15 mg at 06/14/25 0822    cetirizine (zyrTEC) tablet 10 mg  10 mg Oral BID Toya Powell PA-C   10 mg at 06/14/25 0822    diphenhydrAMINE (BENADRYL) capsule 50 mg  50 mg Oral Q24H Nevin Zabala MD        Or    diphenhydrAMINE (BENADRYL) injection 50 mg  50 mg Intravenous Q24H Nevin Zabala MD        hydroxychloroquine (PLAQUENIL) tablet 200 mg  200 mg Oral BID Toya Powell PA-C   200 mg at 06/14/25 0822    immune globulin - sucrose free 10% (PRIVIGEN) infusion 20 g  0.4 g/kg (Ideal) Intravenous Q24H Nevin Zabala MD        lidocaine (PF) (XYLOCAINE) 1 % injection 30 mL  30 mL Intradermal Once Nevin Gamble MD        norethindrone (AYGESTIN) tablet 5 mg  5 mg Oral Daily Toya Powell PA-C   5 mg at 06/14/25 0822    pantoprazole (PROTONIX) EC tablet 40 mg  40 mg Oral Daily Toya Powell PA-C   40 mg at 06/14/25 0822    polyethylene glycol (MIRALAX) Packet 17 g  17 g Oral TID Keyonna Caban PA-C   17 g at 06/14/25 0821    predniSONE (DELTASONE) tablet 60 mg  60 mg Oral Daily Nevin Gamble MD   60 mg at 06/14/25 0822    pregabalin (LYRICA) capsule 100 mg  100 mg Oral QAM Toya Powell PA-C   100 mg at 06/14/25 0822    pregabalin (LYRICA) capsule 100 mg  100 mg Oral Daily with lunch Toya Powell PA-C   100 mg at 06/13/25 1212    pregabalin (LYRICA) capsule 200 mg  200 mg Oral QPM Toya Powell PA-C   200 mg at 06/13/25 2051    senna-docusate (SENOKOT-S/PERICOLACE) 8.6-50 MG per tablet 1 tablet  1 tablet Oral BID Toya Powell PA-C   1 tablet at 06/14/25 0822    Or     senna-docusate (SENOKOT-S/PERICOLACE) 8.6-50 MG per tablet 2 tablet  2 tablet Oral BID Toya Powell PA-C   2 tablet at 06/13/25 2204    sodium chloride (PF) 0.9% PF flush 10 mL  10 mL Intravenous Once Nevin Gamble MD        sodium chloride (PF) 0.9% PF flush 3 mL  3 mL Intracatheter Q8H TOSHIA Toya Powell PA-C   3 mL at 06/14/25 0823     PRN Meds:   Current Facility-Administered Medications   Medication Dose Route Frequency Provider Last Rate Last Admin    acetaminophen (TYLENOL) tablet 650 mg  650 mg Oral Q4H PRN Toya Powell PA-C   650 mg at 06/14/25 0823    Or    acetaminophen (TYLENOL) Suppository 650 mg  650 mg Rectal Q4H PRN Toya Powell PA-C        albuterol (PROVENTIL HFA/VENTOLIN HFA) inhaler  1-2 puff Inhalation Once PRN Nevin Zabala MD        albuterol (PROVENTIL) neb solution 2.5 mg  2.5 mg Nebulization Once PRN Nevin Zabala MD        albuterol (PROVENTIL) neb solution 2.5 mg  2.5 mg Nebulization Q6H PRN Toya Powell PA-C        alum & mag hydroxide-simethicone (MAALOX) suspension 30 mL  30 mL Oral Q4H PRN Toya Powell PA-C        bisacodyl (DULCOLAX) suppository 10 mg  10 mg Rectal Daily PRN Toya Powell PA-C        calcium carbonate (TUMS) chewable tablet 1,000 mg  1,000 mg Oral 4x Daily PRN Toya Powell PA-C        clonazePAM (klonoPIN) tablet 0.5 mg  0.5 mg Oral Daily PRN Toya Powell PA-C        cyclobenzaprine (FLEXERIL) tablet 5 mg  5 mg Oral TID PRN Toya Powell PA-C   5 mg at 06/13/25 2223    glucose gel 15-30 g  15-30 g Oral Q15 Min PRN Osito Pena PA-C        Or    dextrose 50 % injection 25-50 mL  25-50 mL Intravenous Q15 Min PRN Osito Pena PA-C        Or    glucagon injection 1 mg  1 mg Subcutaneous Q15 Min PRN Osito Pena PA-C        diphenhydrAMINE (BENADRYL) injection 25 mg  25 mg Intravenous Once PRN Vj,  Nevin Green MD        Or    diphenhydrAMINE (BENADRYL) injection 50 mg  50 mg Intravenous Once PRN Nevin Zabala MD        EPINEPHrine (ADRENALIN) kit 0.3 mg  0.3 mg Intramuscular Q5 Min PRN Nevin Zabala MD        famotidine (PEPCID) injection 20 mg  20 mg Intravenous Once PRN Nevin Zabala MD        hydrOXYzine HCl (ATARAX) tablet 25 mg  25 mg Oral Q8H PRN Toya Powell PA-C   25 mg at 06/11/25 0438    ketorolac (TORADOL) injection 15 mg  15 mg Intravenous Q6H PRN Toya Powell PA-C   15 mg at 06/10/25 1444    lidocaine (LMX4) cream   Topical Q1H PRN Maik Hess MD        lidocaine 1 % 0.1-1 mL  0.1-1 mL Other Q1H PRN Toya Powell PA-C        lidocaine 1 % 0.1-5 mL  0.1-5 mL Other Q1H PRN Maik Hess MD        magnesium citrate solution 148 mL  148 mL Oral Once PRN Alma Vaughan DO        meperidine (DEMEROL) injection 25 mg  25 mg Intravenous Once PRN Nevin Zabala MD        methylPREDNISolone sodium succinate (SOLU-MEDROL) injection 40 mg  40 mg Intravenous Once PRN Nevin Zabala MD        naloxone (NARCAN) injection 0.2 mg  0.2 mg Intravenous Q2 Min PRN Alexandria Mcwilliams MD        Or    naloxone (NARCAN) injection 0.4 mg  0.4 mg Intravenous Q2 Min PRN Alexandria Mcwilliams MD        Or    naloxone (NARCAN) injection 0.2 mg  0.2 mg Intramuscular Q2 Min PRN Alexandria Mcwilliams MD        Or    naloxone (NARCAN) injection 0.4 mg  0.4 mg Intramuscular Q2 Min PRN Alexandria Mcwilliams MD        ondansetron (ZOFRAN ODT) ODT tab 4 mg  4 mg Oral Q6H PRN Toya Powell PA-C   4 mg at 06/12/25 0736    Or    ondansetron (ZOFRAN) injection 4 mg  4 mg Intravenous Q6H PRN Toya Powell PA-C        Patient is already receiving anticoagulation with heparin, enoxaparin (LOVENOX), warfarin (COUMADIN)  or other anticoagulant medication   Does not apply Continuous PRN Toya Powell,  REUBEN        senna-docusate (SENOKOT-S/PERICOLACE) 8.6-50 MG per tablet 1 tablet  1 tablet Oral BID PRN Toya Powell PA-C        Or    senna-docusate (SENOKOT-S/PERICOLACE) 8.6-50 MG per tablet 2 tablet  2 tablet Oral BID PRN Toya Powell PA-C        sodium chloride (PF) 0.9% PF flush 10-40 mL  10-40 mL Intracatheter Once PRN Maik Hess MD        sodium chloride (PF) 0.9% PF flush 3 mL  3 mL Intracatheter q1 min prn Toya Powell PA-C        sodium chloride 0.9% BOLUS 1,000 mL  1,000 mL Intravenous Once PRN Nevin Zabala MD                Data:      All new lab and imaging data was reviewed.       CSF  Lab pending O band     ESR 16 - on 6/13   Prior 140         Assessment and Plan:          Hx of CIDP, worsening leg strength, possible flare.  MRI imaging reassuring with no acute CNS process, does have T2 brain changes but stable -  LP reassuring with only slight protein elevation - plan to restart IVIG for CIDP flare      Headaches: tension in description and could be cervicogenic, no migrainous quality.       RECOMMENDATIONS      - Start IVIG 0.4g/kg x 5 days      - access still concern, declined further PICC trails, plan to try with PIV, discussed with IR and consult placed for port for Monday, reviewed risks of port and does not have final treatment plan with outpatient neuro but patient prefers port placement given access issues   - PT/OT   - Headaches - continue Lyrica, okay to continue prednisone 60mg x 5 days, increase amitriptyline to 75mg at night and tolerating could increase to 100mg  Placed PRN orders for Zofran/Mag, plan IV Benadryl with IVIG  - Elevated ESR/CRP - has had in past as well uncertain cause as no clear infection - Placed orders for ORVILLE/JULIA panel      Neuro will continue to follow     58   min spent on review of chart, exam, care coordination, and documentation on date of service

## 2025-06-14 NOTE — PLAN OF CARE
Goal Outcome Evaluation:      Plan of Care Reviewed With: patient      CMS/ neuro unchanged & stable. Denies dizziness today. Pain is minimal ex L sided headache. LP performed today, tolerated, site WDL. Neurology following.   PICC line, IVIG; transfer to Neuro.

## 2025-06-15 ENCOUNTER — HEALTH MAINTENANCE LETTER (OUTPATIENT)
Age: 35
End: 2025-06-15

## 2025-06-15 LAB — PHOSPHATE SERPL-MCNC: 1.9 MG/DL (ref 2.5–4.5)

## 2025-06-15 PROCEDURE — 258N000003 HC RX IP 258 OP 636: Performed by: PSYCHIATRY & NEUROLOGY

## 2025-06-15 PROCEDURE — 250N000013 HC RX MED GY IP 250 OP 250 PS 637

## 2025-06-15 PROCEDURE — 86235 NUCLEAR ANTIGEN ANTIBODY: CPT | Performed by: INTERNAL MEDICINE

## 2025-06-15 PROCEDURE — 999N000040 HC STATISTIC CONSULT NO CHARGE VASC ACCESS

## 2025-06-15 PROCEDURE — 84100 ASSAY OF PHOSPHORUS: CPT | Performed by: INTERNAL MEDICINE

## 2025-06-15 PROCEDURE — 250N000011 HC RX IP 250 OP 636: Mod: JZ | Performed by: STUDENT IN AN ORGANIZED HEALTH CARE EDUCATION/TRAINING PROGRAM

## 2025-06-15 PROCEDURE — 250N000011 HC RX IP 250 OP 636: Mod: JZ | Performed by: PHYSICIAN ASSISTANT

## 2025-06-15 PROCEDURE — 250N000011 HC RX IP 250 OP 636: Performed by: PSYCHIATRY & NEUROLOGY

## 2025-06-15 PROCEDURE — 99232 SBSQ HOSP IP/OBS MODERATE 35: CPT | Performed by: INTERNAL MEDICINE

## 2025-06-15 PROCEDURE — 250N000013 HC RX MED GY IP 250 OP 250 PS 637: Performed by: PSYCHIATRY & NEUROLOGY

## 2025-06-15 PROCEDURE — 36415 COLL VENOUS BLD VENIPUNCTURE: CPT | Performed by: INTERNAL MEDICINE

## 2025-06-15 PROCEDURE — 120N000001 HC R&B MED SURG/OB

## 2025-06-15 PROCEDURE — 999N000127 HC STATISTIC PERIPHERAL IV START W US GUIDANCE

## 2025-06-15 PROCEDURE — 86038 ANTINUCLEAR ANTIBODIES: CPT | Performed by: INTERNAL MEDICINE

## 2025-06-15 PROCEDURE — 250N000012 HC RX MED GY IP 250 OP 636 PS 637: Performed by: STUDENT IN AN ORGANIZED HEALTH CARE EDUCATION/TRAINING PROGRAM

## 2025-06-15 PROCEDURE — 250N000013 HC RX MED GY IP 250 OP 250 PS 637: Performed by: PHYSICIAN ASSISTANT

## 2025-06-15 RX ORDER — MAGNESIUM SULFATE HEPTAHYDRATE 40 MG/ML
2 INJECTION, SOLUTION INTRAVENOUS EVERY 12 HOURS PRN
Status: DISCONTINUED | OUTPATIENT
Start: 2025-06-15 | End: 2025-06-19 | Stop reason: HOSPADM

## 2025-06-15 RX ADMIN — ACETAMINOPHEN 650 MG: 325 TABLET, FILM COATED ORAL at 21:37

## 2025-06-15 RX ADMIN — CETIRIZINE HYDROCHLORIDE 10 MG: 10 TABLET, FILM COATED ORAL at 20:15

## 2025-06-15 RX ADMIN — PREDNISONE 60 MG: 20 TABLET ORAL at 07:38

## 2025-06-15 RX ADMIN — PANTOPRAZOLE SODIUM 40 MG: 40 TABLET, DELAYED RELEASE ORAL at 07:39

## 2025-06-15 RX ADMIN — HYDROXYCHLOROQUINE SULFATE 200 MG: 200 TABLET, FILM COATED ORAL at 07:39

## 2025-06-15 RX ADMIN — PREGABALIN 200 MG: 100 CAPSULE ORAL at 20:13

## 2025-06-15 RX ADMIN — PREGABALIN 100 MG: 100 CAPSULE ORAL at 12:18

## 2025-06-15 RX ADMIN — SENNOSIDES AND DOCUSATE SODIUM 2 TABLET: 50; 8.6 TABLET ORAL at 07:39

## 2025-06-15 RX ADMIN — HYDROXYCHLOROQUINE SULFATE 200 MG: 200 TABLET, FILM COATED ORAL at 20:14

## 2025-06-15 RX ADMIN — DIPHENHYDRAMINE HYDROCHLORIDE 25 MG: 50 INJECTION, SOLUTION INTRAMUSCULAR; INTRAVENOUS at 17:52

## 2025-06-15 RX ADMIN — DIPHENHYDRAMINE HYDROCHLORIDE 50 MG: 50 INJECTION, SOLUTION INTRAMUSCULAR; INTRAVENOUS at 21:37

## 2025-06-15 RX ADMIN — POLYETHYLENE GLYCOL 3350 17 G: 17 POWDER, FOR SOLUTION ORAL at 16:54

## 2025-06-15 RX ADMIN — PREGABALIN 100 MG: 100 CAPSULE ORAL at 07:39

## 2025-06-15 RX ADMIN — POLYETHYLENE GLYCOL 3350 17 G: 17 POWDER, FOR SOLUTION ORAL at 07:38

## 2025-06-15 RX ADMIN — ACETAMINOPHEN 650 MG: 325 TABLET, FILM COATED ORAL at 07:38

## 2025-06-15 RX ADMIN — CETIRIZINE HYDROCHLORIDE 10 MG: 10 TABLET, FILM COATED ORAL at 07:39

## 2025-06-15 RX ADMIN — MAGNESIUM SULFATE HEPTAHYDRATE 2 G: 40 INJECTION, SOLUTION INTRAVENOUS at 12:18

## 2025-06-15 RX ADMIN — AMITRIPTYLINE HYDROCHLORIDE 75 MG: 25 TABLET, FILM COATED ORAL at 21:37

## 2025-06-15 RX ADMIN — ONDANSETRON 4 MG: 4 TABLET, ORALLY DISINTEGRATING ORAL at 17:52

## 2025-06-15 RX ADMIN — NORETHINDRONE ACETATE 5 MG: 5 TABLET ORAL at 07:39

## 2025-06-15 RX ADMIN — HUMAN IMMUNOGLOBULIN G 20 G: 20 LIQUID INTRAVENOUS at 22:20

## 2025-06-15 RX ADMIN — BUSPIRONE HYDROCHLORIDE 15 MG: 15 TABLET ORAL at 20:14

## 2025-06-15 RX ADMIN — BUSPIRONE HYDROCHLORIDE 15 MG: 15 TABLET ORAL at 07:39

## 2025-06-15 RX ADMIN — ONDANSETRON 4 MG: 4 TABLET, ORALLY DISINTEGRATING ORAL at 10:06

## 2025-06-15 ASSESSMENT — ACTIVITIES OF DAILY LIVING (ADL)
ADLS_ACUITY_SCORE: 65

## 2025-06-15 NOTE — PLAN OF CARE
Goal Outcome Evaluation:    Reason for Admission: Admitting Diagnosis: Chronic inflammatory demyelinating polyneuropathy    Cognitive/Mentation: A/Ox 4  Neuros/CMS: Intact ex Gen weakness  VS: stable RA.   Tele: NA.  /GI: Continent.   Pulmonary: LS clear RA.  Pain: HA scheduled tylenol given.   Drains/Lines: IVIG infusing  Skin: Intact  Activity: Assist x 1 with GBW.  Diet: Reg with thin liquids. Takes pills whole with water.     Discharge: Pend    Aggression Stoplight Tool: green

## 2025-06-15 NOTE — PROGRESS NOTES
Neurology Progress Note        Nevin Zabala MD   06/15/2025        Interval History:      Continues to have difficulty with IV access,  did get IVIG overnight and tolerated well.  No change  In neuro symptoms.  Continues to have headaches.                   Physical Exam:       , Blood pressure (!) 132/91, pulse 88, temperature 99  F (37.2  C), resp. rate 16, SpO2 98%, not currently breastfeeding.  There were no vitals filed for this visit.  Vital Signs with Ranges  Temp:  [98.8  F (37.1  C)-99.8  F (37.7  C)] 99  F (37.2  C)  Pulse:  [75-95] 88  Resp:  [16-18] 16  BP: (124-146)/(70-93) 132/91  SpO2:  [96 %-98 %] 98 %    Neurological Exam:  General: Mental status -Alert and orientated, speech without dysarthria, language fluent with intact naming and repetition  Cranial Nerves-  Pupils equal round and reactive to light. Extraocular movements intact. No nystagmus.  Face symmetric and intact to light touch, tongue midline, palate activates symmetrically. Shoulder shrug 5/5  Motor: Normal muscle bulk and tone.  Has 5/5 strength in bilateral upper. Lowers - hip flexion 5 on right, 5- on left , knee ext 5- on right, 4+ on left,  ADF 5 on rght, < 3 on left,  plantarflexion 5/5  Sensation: intact to light touch and pinprick/vibration  in right leg, absent vibration in left great toe and at knee , decreased pinprick in left leg   Reflexes:  patella 1/1, ankles 0/0, toes downgoing  Cerebellar: intact FNF  Gait: Deferred          Medications:        Current Facility-Administered Medications   Medication Dose Route Frequency Provider Last Rate Last Admin    acetaminophen (TYLENOL) tablet 650 mg  650 mg Oral Q24H Nevin Zabala MD   650 mg at 06/14/25 2141    amitriptyline (ELAVIL) tablet 75 mg  75 mg Oral At Bedtime Nevin Zabala MD   75 mg at 06/14/25 2141    ammonium lactate (LAC-HYDRIN) 12 % lotion   Topical BID Alma Vaughan DO   Given at 06/14/25 2208    [Held by provider] apixaban  ANTICOAGULANT (ELIQUIS) tablet 5 mg  5 mg Oral BID Toya Powell PA-C   5 mg at 06/11/25 0842    busPIRone (BUSPAR) tablet 15 mg  15 mg Oral BID Toya Powell PA-C   15 mg at 06/15/25 0739    cetirizine (zyrTEC) tablet 10 mg  10 mg Oral BID Toya Powell PA-C   10 mg at 06/15/25 0739    diphenhydrAMINE (BENADRYL) capsule 50 mg  50 mg Oral Q24H Nevin Zabala MD        Or    diphenhydrAMINE (BENADRYL) injection 50 mg  50 mg Intravenous Q24H eNvin Zabala MD   50 mg at 06/14/25 2142    hydroxychloroquine (PLAQUENIL) tablet 200 mg  200 mg Oral BID Toya Powell PA-C   200 mg at 06/15/25 0739    immune globulin - sucrose free 10% (PRIVIGEN) infusion 20 g  0.4 g/kg (Ideal) Intravenous Q24H Alexandria Mcwilliams MD   Stopped at 06/14/25 5903    lidocaine (PF) (XYLOCAINE) 1 % injection 30 mL  30 mL Intradermal Once Nevin Gamble MD        norethindrone (AYGESTIN) tablet 5 mg  5 mg Oral Daily Toya Powell PA-C   5 mg at 06/15/25 0739    pantoprazole (PROTONIX) EC tablet 40 mg  40 mg Oral Daily Toya Powell PA-C   40 mg at 06/15/25 0739    polyethylene glycol (MIRALAX) Packet 17 g  17 g Oral TID Keyonna Caban PA-C   17 g at 06/15/25 0738    predniSONE (DELTASONE) tablet 60 mg  60 mg Oral Daily Nevin Gamble MD   60 mg at 06/15/25 0738    pregabalin (LYRICA) capsule 100 mg  100 mg Oral QAM Toya Powell PA-C   100 mg at 06/15/25 0739    pregabalin (LYRICA) capsule 100 mg  100 mg Oral Daily with lunch Toya Pwoell PA-C   100 mg at 06/14/25 1233    pregabalin (LYRICA) capsule 200 mg  200 mg Oral QPM Toya Powlel PA-C   200 mg at 06/14/25 2001    senna-docusate (SENOKOT-S/PERICOLACE) 8.6-50 MG per tablet 1 tablet  1 tablet Oral BID Toya Poewll PA-C   1 tablet at 06/14/25 2001    Or    senna-docusate (SENOKOT-S/PERICOLACE) 8.6-50 MG per tablet 2 tablet  2 tablet Oral BID Toya Powell  REUBEN Rothman   2 tablet at 06/15/25 0739    sodium chloride (PF) 0.9% PF flush 10 mL  10 mL Intravenous Once Nevin Gamble MD        sodium chloride (PF) 0.9% PF flush 3 mL  3 mL Intracatheter Q8H TOSHIA Toya Powell PA-C   3 mL at 06/15/25 0740     PRN Meds:   Current Facility-Administered Medications   Medication Dose Route Frequency Provider Last Rate Last Admin    acetaminophen (TYLENOL) tablet 650 mg  650 mg Oral Q4H PRN Toya Powell PA-C   650 mg at 06/15/25 0738    Or    acetaminophen (TYLENOL) Suppository 650 mg  650 mg Rectal Q4H PRN Toya Powell PA-C        albuterol (PROVENTIL HFA/VENTOLIN HFA) inhaler  1-2 puff Inhalation Once PRN Nevin Zabala MD        albuterol (PROVENTIL) neb solution 2.5 mg  2.5 mg Nebulization Once PRN Nevin Zabala MD        albuterol (PROVENTIL) neb solution 2.5 mg  2.5 mg Nebulization Q6H PRN Toya Powell PA-C        alum & mag hydroxide-simethicone (MAALOX) suspension 30 mL  30 mL Oral Q4H PRN Toya Powell PA-C        bisacodyl (DULCOLAX) suppository 10 mg  10 mg Rectal Daily PRN Toya Powell PA-C        calcium carbonate (TUMS) chewable tablet 1,000 mg  1,000 mg Oral 4x Daily PRN Toya Powell PA-C        clonazePAM (klonoPIN) tablet 0.5 mg  0.5 mg Oral Daily PRN Toya Powell PA-C        cyclobenzaprine (FLEXERIL) tablet 5 mg  5 mg Oral TID PRN Toya Powell PA-C   5 mg at 06/13/25 2223    glucose gel 15-30 g  15-30 g Oral Q15 Min PRN Osito Pena PA-C        Or    dextrose 50 % injection 25-50 mL  25-50 mL Intravenous Q15 Min PRN Osito Pena PA-C        Or    glucagon injection 1 mg  1 mg Subcutaneous Q15 Min PRN Osito Pena PA-C        diphenhydrAMINE (BENADRYL) injection 25 mg  25 mg Intravenous Once PRN Nevin Zabala MD        Or    diphenhydrAMINE (BENADRYL) injection 50 mg  50 mg Intravenous Once PRN  Nevin Zabala MD        EPINEPHrine (ADRENALIN) kit 0.3 mg  0.3 mg Intramuscular Q5 Min PRN Nevin Zabala MD        famotidine (PEPCID) injection 20 mg  20 mg Intravenous Once PRN Nevin Zabala MD        hydrOXYzine HCl (ATARAX) tablet 25 mg  25 mg Oral Q8H PRN Toya Powell PA-C   25 mg at 06/11/25 0438    ketorolac (TORADOL) injection 15 mg  15 mg Intravenous Q6H PRN Toya Powell PA-C   15 mg at 06/14/25 1006    lidocaine (LMX4) cream   Topical Q1H PRN Maik Hess MD        lidocaine 1 % 0.1-1 mL  0.1-1 mL Other Q1H PRN Toya Powell PA-C        lidocaine 1 % 0.1-5 mL  0.1-5 mL Other Q1H PRN Maik Hess MD        magnesium citrate solution 148 mL  148 mL Oral Once PRN Alma Vaughan DO        magnesium sulfate 2 g in 50 mL sterile water intermittent infusion  2 g Intravenous Q24H PRN Nevin Zabala MD   Stopped at 06/14/25 1255    meperidine (DEMEROL) injection 25 mg  25 mg Intravenous Once PRN Nevin Zabala MD        methylPREDNISolone sodium succinate (SOLU-MEDROL) injection 40 mg  40 mg Intravenous Once PRN Nevin Zabala MD        naloxone (NARCAN) injection 0.2 mg  0.2 mg Intravenous Q2 Min PRN Alexandria Mcwilliams MD        Or    naloxone (NARCAN) injection 0.4 mg  0.4 mg Intravenous Q2 Min PRN Alexandria Mcwilliams MD        Or    naloxone (NARCAN) injection 0.2 mg  0.2 mg Intramuscular Q2 Min PRN Alexandria Mcwilliams MD        Or    naloxone (NARCAN) injection 0.4 mg  0.4 mg Intramuscular Q2 Min PRN Alexandria Mcwilliams MD        ondansetron (ZOFRAN ODT) ODT tab 4 mg  4 mg Oral Q6H PRN Toya Powell, PA-C   4 mg at 06/12/25 0736    ondansetron (ZOFRAN) injection 8 mg  8 mg Intravenous Q8H PRN Nevin Zabala MD        Patient is already receiving anticoagulation with heparin, enoxaparin (LOVENOX), warfarin (COUMADIN)  or other anticoagulant medication   Does not apply  Continuous PRN Toya Powell PA-C        senna-docusate (SENOKOT-S/PERICOLACE) 8.6-50 MG per tablet 1 tablet  1 tablet Oral BID PRN Toya Powell PA-C        Or    senna-docusate (SENOKOT-S/PERICOLACE) 8.6-50 MG per tablet 2 tablet  2 tablet Oral BID PRN Toya Powell PA-C        sodium chloride (PF) 0.9% PF flush 10-40 mL  10-40 mL Intracatheter Once PRN Maik Hess MD        sodium chloride (PF) 0.9% PF flush 3 mL  3 mL Intracatheter q1 min prn Toya Powell PA-C        sodium chloride 0.9% BOLUS 1,000 mL  1,000 mL Intravenous Once PRN Nevin Zabala MD                Data:      All new lab and imaging data was reviewed.       CSF  Lab pending O band          Assessment and Plan:          Hx of CIDP, worsening leg strength, possible flare.  MRI imaging reassuring with no acute CNS process, does have T2 brain changes but stable -  LP reassuring with only slight protein elevation - plan to restart IVIG for CIDP flare      Headaches: tension in description and could be cervicogenic, vs transformed migraine     RECOMMENDATIONS      - Started IVIG 0.4g/kg x 5 days  - Day 1 - 6/14      - access still concern, declined further PICC trails, plan to continue with PIV, discussed with IR and consult placed for port for Monday, reviewed risks of port and does not have final treatment plan with outpatient neuro but patient prefers port placement given access issues   - made NPO after midnight and blood thinner being held for port   - PT/OT   - Headaches - continue Lyrica, okay to continue prednisone 60mg x 5 days ( started 6/12), increased amitriptyline to 75mg 6/14 at night and tolerating could increase to 100mg  Placed PRN orders for Zofran/Mag/Benadryl   Consider muscle relaxing med next   - Elevated ESR/CRP - has had in past as well uncertain cause as no clear infection - Placed orders for [place]/JULIA panel      Neuro will continue to follow       Hx of PE  (4/2024 and 2019)  - hold PTA eliquis was held for LP, now being held for port   Defer to primary team on needed other coverage while being held for procedure         Neuro will continue to follow    45 min spent on review of chart, exam, care coordination, and documentation on date of service

## 2025-06-15 NOTE — PLAN OF CARE
Status: Patient admitted on 6/10 with CIDP exacerbation  Vitals: VSS  Neuros: Intact ex generalized weakness, LLE 4/5 with weak dorsi/planter flexion, RLE 4/5, BUE 5/5, numbness on top of left foot  IV: PIV saline locked  Labs/Electrolytes: WNL  Resp: Lung sounds clear throughout  Diet: Regular  GI: Large BM today  : Voiding spontaneously  Skin: Intact  Pain: Patient c/o 10/10 headache, Tylenol, Magnesium, and IV Benadryl given  Activity: Up with SBA, walker, gaitbelt  Social: Cooperative with cares  Plan: 2nd dose of IVIG this evening, continue to monitor and follow POC    Goal Outcome Evaluation:      Plan of Care Reviewed With: patient    Overall Patient Progress: improvingOverall Patient Progress: improving

## 2025-06-15 NOTE — PROGRESS NOTES
[facility]    Medicine Progress Note - Hospitalist Service    Date of Admission:  6/10/2025    Assessment & Plan     Nevin Alvarado is a 34 year old female with h/o CIDP who presented on 6/10/2025 with worsening leg pain, weakness, falls, back pain and headaches.        Worsening neuropathy with falls in patient with chronic inflammatory demyelinating polyneuropathy - likely due to CIDP flare up  -CIDP diagnosed in 2015, characterized by the lower extremities. Has been treated with weekly or biweekly IVIG [id number]. She discontinued IVIG due to lack of efficacy, but noted worsening symptoms after stopping IVIG. She was admitted to [facility] in 2024 with flare up, treated with plasma exchange x 7 sessions, with some improvement in strength  -follows with Dr. Chance of [facility], refer to detailed note from 5/15/25. She has appointment for EMG on 6/30 and has been referred to medical genetics.   -she typically experiences lower extremity weakness, L > R and has foot drop, worse on L. Tremors in hands, tingling and numbness in arms and sensitivity to pressure causing numbness from arms to feet.   -recent outpatient work up 5/15 showed   - normal Vit D, B12, Methylmalonic acid 0.14. CRP elevated at 7.6  -negative paraneoplastic autoantibodies  -negative SSA Ro and SSB La [place] IgG, negative Glutamic acid decarboxylase Ab  axonal, autoimmune/paraneoplastic panel negative. Peripheral nervous system demyelinating neuropathy, autoimmune eval negative. 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 ([id number]), VARIANT OF UNCERTAIN SIGNIFICANCE (refer to result for complete details on recommendations)  *6/10 Sed rate>140, CRP 20.82  - General Neurology consulted, MRI Brain C/T/L spine with anesthesia afternoon 6/12 completed.   - Neuro checks per routine  - LP 6/13 showed elevated CSF protein  - Continue PTA Lyrica (with dose adjustment as below) and PRN Flexeril   - PT consult  -  "IVIG per neurology. Use peripheral line for now. Patient wants port placement. Can possibly be done on 6/16. Continue to hold DOAC for now as likely to have port placement on 6/16     Cervicogenic headaches with left-sided predominance are secondary to occipital neuralgia: Noted per 5/15 Neurology note. Pt reports headaches have been worsening, described as a pressure in her head, \"worst headache of my life\" that will linger for 24 hours, dissipate, then come back after 12 hours with some associated blurred vision.   *6/1 ED Visit, ativan 1mg, mag sulfate 2g, 1L bolus but did not alleviate her pain.  *6/10 toradol trialed, no improvement.  - Neurology following  - imitrex available PRN.  - PTA lyrica increased with 100mg dose added midday per neurology rec  - 6/11 2g IV mag given and started prednisone 60mg daily for 5 days  - continue IV mag prn and prednisone      Constipation, improved  Hx of thrombosed hemorrhoids: Hx of this, reports no BM X1 week, not passing flatus. Some abdominal pain in lower abdomen radiating to LUQ and epigastric region. Reports that she has trialed colace, miralax, suppositories, manual disimpaction outpatient with no relief. Not on narcotics. ? If related to mounjaro. Recently had a colonoscopy with [facility] which was reported as normal, but was referred to Colorectal Surgery for thrombosed hemorrhoids. No intervention performed as pt is on Eliquis. She has subsequently developed a few more hemorrhoids.   *6/10 KUB moderate stool burden  -Colorectal Surgery consulted for significant constipation, appreciate recommendations, signed off on 6/12  -Senokot BID, Miralax TID, PRN suppository and mag citrate available              -reduce as needed  -*good BM 6/11 midday and overnight, but still feels constipated on 6/12     Atypical chest pain- resolved  Localized to center of the chest, described as a burning. No radiation. Not aggravated with activity. No SOB. Vitals stable. No cardiac hx. ? " "GERD.   *EKG: sinus  - PRN maalox     Hx of PE (4/2024 and 2019)  - hold PTA eliquis while awaiting LP, resume post procedure 6/13  - intermittent heparin while awaiting LP     Obesity: PTA Adriel has been on hold in the setting of recent outpatient procedures (colonoscopy, endoscopy, knee replacement), pt tried restarting, but became acutely ill with N/V, thus has remained off for now and will work with her outpatient weight management team to discuss resumption.      Fibromyalgia: Follows with [facility]. Has been put on Plaquenil 200 mg BID for unclear rheumatologic process. Defer management to them. Will continue for now.      ZOHRA: Does not tolerate CPAP      Generalized anxiety disorder   Borderline personality disorder   MDD   PTSD  Hx of self injurious behavior: Noted per Psychiatry note 4/2024. Reports stable mental health.   - Resume PTA meds including Buspar, Amitriptyline, PRN Klonopin and Atarax        Diet: Regular Diet Adult    DVT Prophylaxis: DOAC/lovenox   Hazel Catheter: Not present  Lines: None     Cardiac Monitoring: None  Code Status: Full Code      Clinically Significant Risk Factors                              # Morbid Obesity: Estimated body mass index is 41.34 kg/m  as calculated from the following:    Height as of 6/1/25: 1.575 m (5' 2\").    Weight as of 6/9/25: 102.5 kg (226 lb).        # Financial/Environmental Concerns: none         Social Drivers of Health    Depression: At risk (3/12/2025)    Received from [employer]    PHQ-2     PHQ-2 TOTAL SCORE: 3   Financial Resource Strain: Medium Risk (3/2/2025)    Received from [employer]    Financial Resource Strain     Difficulty of Paying Living Expenses: 2     Difficulty of Paying Living Expenses: 1   Transportation Needs: Unmet Transportation Needs (3/2/2025)    Received from [employer]    Transportation Needs     Does lack of " transportation keep you from medical appointments?: 1     Does lack of transportation keep you from work, meetings or getting things that you need?: 2   Physical Activity: Insufficiently Active (11/13/2024)    Received from [facility]    Exercise Vital Sign     Days of Exercise per Week: 3 days     Minutes of Exercise per Session: 10 min          Disposition Plan     Medically Ready for Discharge: Anticipated in 2-4 Days             Deanna Severino MD  Hospitalist Service  [facility]  Securely message with [employer] (more info)  Text page via [employer] Paging/Directory   ______________________________________________________________________    Interval History   Feels ok. Will start IVIG today. Hopefully port placement on Monday       Physical Exam   Vital Signs: Temp: 99  F (37.2  C) Temp src: Oral BP: 131/74 Pulse: 89   Resp: 16 SpO2: 96 % O2 Device: None (Room air)    Weight: 0 lbs 0 oz    Constitutional - awake and alert, resting in bed, in no acute distress  Cardiovascular - regular rate and rhythm, no murmurs  Pulmonary - lungs are clear to auscultation bilaterally, no wheezing or rhonchi  Integumentary - skin is warm and dry, no rashes or ulcers  Neurological - awake, alert and oriented x3.  Moving all 4 extremities, normal speech, bilateral foot drop    Medical Decision Making       55 MINUTES SPENT BY ME on the date of service doing chart review, history, exam, documentation & further activities per the note.      Data         Imaging results reviewed over the past 24 hrs:   No results found for this or any previous visit (from the past 24 hours).

## 2025-06-15 NOTE — PLAN OF CARE
Goal Outcome Evaluation:      Plan of Care Reviewed With: patient    Overall Patient Progress: no changeOverall Patient Progress: no change     Pt here with CIDP flare up. A&Ox4. Refused overnight assessments. VSS on RA.  Regular diet, thin liquids. Takes pills whole. Up with 1 GBW. Continent. Denies pain. PIV SL. Pt scoring green on the Aggression Stop Light Tool. Discharge pending. Ph protocol.

## 2025-06-16 ENCOUNTER — APPOINTMENT (OUTPATIENT)
Dept: OCCUPATIONAL THERAPY | Facility: CLINIC | Age: 35
DRG: 074 | End: 2025-06-16
Payer: COMMERCIAL

## 2025-06-16 ENCOUNTER — APPOINTMENT (OUTPATIENT)
Dept: INTERVENTIONAL RADIOLOGY/VASCULAR | Facility: CLINIC | Age: 35
DRG: 074 | End: 2025-06-16
Attending: PSYCHIATRY & NEUROLOGY
Payer: COMMERCIAL

## 2025-06-16 LAB
ALB CSF/SERPL: 9.4 RATIO
ALBUMIN CSF-MCNC: 35 MG/DL
ALBUMIN SERPL-MCNC: 3738 MG/DL
ALBUMIN UR-MCNC: NEGATIVE MG/DL
ANA PAT SER IF-IMP: ABNORMAL
ANA SER QL IF: ABNORMAL
ANA TITR SER IF: ABNORMAL {TITER}
APPEARANCE UR: CLEAR
BILIRUB UR QL STRIP: NEGATIVE
COLOR UR AUTO: NORMAL
GLUCOSE UR STRIP-MCNC: NEGATIVE MG/DL
HGB UR QL STRIP: NEGATIVE
IGG CSF-MCNC: 3.5 MG/DL
IGG SERPL-MCNC: 508 MG/DL
IGG SYNTH RATE SER+CSF CALC-MRATE: 5.1 MG/D
IGG/ALB CLEAR SER+CSF-RTO: 0.74 RATIO
IGG/ALB CSF: 0.1 RATIO
KETONES UR STRIP-MCNC: NEGATIVE MG/DL
LEUKOCYTE ESTERASE UR QL STRIP: NEGATIVE
NITRATE UR QL: NEGATIVE
OLIGOCLONAL BANDS CSF ELPH-IMP: ABNORMAL
OLIGOCLONAL BANDS CSF ELPH-IMP: NEGATIVE
OLIGOCLONAL BANDS CSF IEF: 0 BANDS
PH UR STRIP: 7 [PH] (ref 5–7)
SP GR UR STRIP: 1.01 (ref 1–1.03)
UROBILINOGEN UR STRIP-MCNC: NORMAL MG/DL

## 2025-06-16 PROCEDURE — C1788 PORT, INDWELLING, IMP: HCPCS

## 2025-06-16 PROCEDURE — 250N000011 HC RX IP 250 OP 636: Mod: JZ | Performed by: STUDENT IN AN ORGANIZED HEALTH CARE EDUCATION/TRAINING PROGRAM

## 2025-06-16 PROCEDURE — 250N000013 HC RX MED GY IP 250 OP 250 PS 637: Performed by: INTERNAL MEDICINE

## 2025-06-16 PROCEDURE — 250N000013 HC RX MED GY IP 250 OP 250 PS 637: Performed by: PHYSICIAN ASSISTANT

## 2025-06-16 PROCEDURE — 250N000013 HC RX MED GY IP 250 OP 250 PS 637

## 2025-06-16 PROCEDURE — 250N000011 HC RX IP 250 OP 636: Performed by: PSYCHIATRY & NEUROLOGY

## 2025-06-16 PROCEDURE — 120N000001 HC R&B MED SURG/OB

## 2025-06-16 PROCEDURE — 250N000011 HC RX IP 250 OP 636: Mod: JZ | Performed by: INTERNAL MEDICINE

## 2025-06-16 PROCEDURE — 250N000013 HC RX MED GY IP 250 OP 250 PS 637: Performed by: PSYCHIATRY & NEUROLOGY

## 2025-06-16 PROCEDURE — 81003 URINALYSIS AUTO W/O SCOPE: CPT | Performed by: HOSPITALIST

## 2025-06-16 PROCEDURE — 272N000196 HC ACCESSORY CR5

## 2025-06-16 PROCEDURE — 97535 SELF CARE MNGMENT TRAINING: CPT | Mod: GO

## 2025-06-16 PROCEDURE — 250N000012 HC RX MED GY IP 250 OP 636 PS 637: Performed by: STUDENT IN AN ORGANIZED HEALTH CARE EDUCATION/TRAINING PROGRAM

## 2025-06-16 PROCEDURE — 99152 MOD SED SAME PHYS/QHP 5/>YRS: CPT

## 2025-06-16 PROCEDURE — 99232 SBSQ HOSP IP/OBS MODERATE 35: CPT | Performed by: INTERNAL MEDICINE

## 2025-06-16 PROCEDURE — C1769 GUIDE WIRE: HCPCS

## 2025-06-16 PROCEDURE — 250N000011 HC RX IP 250 OP 636: Performed by: STUDENT IN AN ORGANIZED HEALTH CARE EDUCATION/TRAINING PROGRAM

## 2025-06-16 PROCEDURE — 250N000009 HC RX 250: Performed by: NURSE PRACTITIONER

## 2025-06-16 PROCEDURE — 250N000011 HC RX IP 250 OP 636: Mod: JZ | Performed by: NURSE PRACTITIONER

## 2025-06-16 RX ORDER — CLINDAMYCIN PHOSPHATE 900 MG/50ML
900 INJECTION, SOLUTION INTRAVENOUS
Status: COMPLETED | OUTPATIENT
Start: 2025-06-16 | End: 2025-06-16

## 2025-06-16 RX ORDER — NALOXONE HYDROCHLORIDE 0.4 MG/ML
0.4 INJECTION, SOLUTION INTRAMUSCULAR; INTRAVENOUS; SUBCUTANEOUS
Status: DISCONTINUED | OUTPATIENT
Start: 2025-06-16 | End: 2025-06-16 | Stop reason: HOSPADM

## 2025-06-16 RX ORDER — NALOXONE HYDROCHLORIDE 0.4 MG/ML
0.2 INJECTION, SOLUTION INTRAMUSCULAR; INTRAVENOUS; SUBCUTANEOUS
Status: DISCONTINUED | OUTPATIENT
Start: 2025-06-16 | End: 2025-06-16 | Stop reason: HOSPADM

## 2025-06-16 RX ORDER — DIPHENHYDRAMINE HYDROCHLORIDE 50 MG/ML
25 INJECTION, SOLUTION INTRAMUSCULAR; INTRAVENOUS ONCE
Status: COMPLETED | OUTPATIENT
Start: 2025-06-16 | End: 2025-06-16

## 2025-06-16 RX ORDER — FENTANYL CITRATE 50 UG/ML
25-50 INJECTION, SOLUTION INTRAMUSCULAR; INTRAVENOUS EVERY 5 MIN PRN
Status: DISCONTINUED | OUTPATIENT
Start: 2025-06-16 | End: 2025-06-16 | Stop reason: HOSPADM

## 2025-06-16 RX ORDER — CLINDAMYCIN PHOSPHATE 900 MG/50ML
900 INJECTION, SOLUTION INTRAVENOUS
Status: DISCONTINUED | OUTPATIENT
Start: 2025-06-16 | End: 2025-06-16 | Stop reason: HOSPADM

## 2025-06-16 RX ORDER — HEPARIN SODIUM (PORCINE) LOCK FLUSH IV SOLN 100 UNIT/ML 100 UNIT/ML
500 SOLUTION INTRAVENOUS ONCE
Status: COMPLETED | OUTPATIENT
Start: 2025-06-16 | End: 2025-06-16

## 2025-06-16 RX ORDER — ACETAMINOPHEN 325 MG/1
650 TABLET ORAL
OUTPATIENT
Start: 2025-06-16

## 2025-06-16 RX ORDER — FLUMAZENIL 0.1 MG/ML
0.2 INJECTION, SOLUTION INTRAVENOUS
Status: DISCONTINUED | OUTPATIENT
Start: 2025-06-16 | End: 2025-06-16 | Stop reason: HOSPADM

## 2025-06-16 RX ORDER — KETOROLAC TROMETHAMINE 15 MG/ML
15 INJECTION, SOLUTION INTRAMUSCULAR; INTRAVENOUS ONCE
Status: COMPLETED | OUTPATIENT
Start: 2025-06-16 | End: 2025-06-16

## 2025-06-16 RX ADMIN — POLYETHYLENE GLYCOL 3350 17 G: 17 POWDER, FOR SOLUTION ORAL at 15:12

## 2025-06-16 RX ADMIN — DIBASIC SODIUM PHOSPHATE, MONOBASIC POTASSIUM PHOSPHATE AND MONOBASIC SODIUM PHOSPHATE 250 MG: 852; 155; 130 TABLET ORAL at 21:54

## 2025-06-16 RX ADMIN — HYDROXYZINE HYDROCHLORIDE 25 MG: 25 TABLET, FILM COATED ORAL at 00:34

## 2025-06-16 RX ADMIN — LIDOCAINE HYDROCHLORIDE 5 ML: 10; .005 INJECTION, SOLUTION EPIDURAL; INFILTRATION; INTRACAUDAL; PERINEURAL at 14:10

## 2025-06-16 RX ADMIN — BUSPIRONE HYDROCHLORIDE 15 MG: 15 TABLET ORAL at 20:47

## 2025-06-16 RX ADMIN — FENTANYL CITRATE 50 MCG: 50 INJECTION, SOLUTION INTRAMUSCULAR; INTRAVENOUS at 14:00

## 2025-06-16 RX ADMIN — PREDNISONE 60 MG: 20 TABLET ORAL at 08:48

## 2025-06-16 RX ADMIN — LIDOCAINE HYDROCHLORIDE 5 ML: 10 INJECTION, SOLUTION INFILTRATION; PERINEURAL at 14:10

## 2025-06-16 RX ADMIN — PANTOPRAZOLE SODIUM 40 MG: 40 TABLET, DELAYED RELEASE ORAL at 08:50

## 2025-06-16 RX ADMIN — BUSPIRONE HYDROCHLORIDE 15 MG: 15 TABLET ORAL at 08:50

## 2025-06-16 RX ADMIN — APIXABAN 5 MG: 5 TABLET, FILM COATED ORAL at 20:47

## 2025-06-16 RX ADMIN — FENTANYL CITRATE 50 MCG: 50 INJECTION, SOLUTION INTRAMUSCULAR; INTRAVENOUS at 13:48

## 2025-06-16 RX ADMIN — CETIRIZINE HYDROCHLORIDE 10 MG: 10 TABLET, FILM COATED ORAL at 08:50

## 2025-06-16 RX ADMIN — DIBASIC SODIUM PHOSPHATE, MONOBASIC POTASSIUM PHOSPHATE AND MONOBASIC SODIUM PHOSPHATE 250 MG: 852; 155; 130 TABLET ORAL at 13:02

## 2025-06-16 RX ADMIN — MIDAZOLAM 1 MG: 1 INJECTION INTRAMUSCULAR; INTRAVENOUS at 14:01

## 2025-06-16 RX ADMIN — FENTANYL CITRATE 50 MCG: 50 INJECTION, SOLUTION INTRAMUSCULAR; INTRAVENOUS at 13:43

## 2025-06-16 RX ADMIN — KETOROLAC TROMETHAMINE 15 MG: 15 INJECTION, SOLUTION INTRAMUSCULAR; INTRAVENOUS at 15:05

## 2025-06-16 RX ADMIN — PREGABALIN 100 MG: 100 CAPSULE ORAL at 11:48

## 2025-06-16 RX ADMIN — HYDROXYCHLOROQUINE SULFATE 200 MG: 200 TABLET, FILM COATED ORAL at 08:50

## 2025-06-16 RX ADMIN — DIBASIC SODIUM PHOSPHATE, MONOBASIC POTASSIUM PHOSPHATE AND MONOBASIC SODIUM PHOSPHATE 250 MG: 852; 155; 130 TABLET ORAL at 00:34

## 2025-06-16 RX ADMIN — ACETAMINOPHEN 650 MG: 325 TABLET, FILM COATED ORAL at 14:40

## 2025-06-16 RX ADMIN — PREGABALIN 200 MG: 100 CAPSULE ORAL at 20:47

## 2025-06-16 RX ADMIN — SENNOSIDES AND DOCUSATE SODIUM 2 TABLET: 50; 8.6 TABLET ORAL at 08:48

## 2025-06-16 RX ADMIN — ACETAMINOPHEN 650 MG: 325 TABLET, FILM COATED ORAL at 08:49

## 2025-06-16 RX ADMIN — DIPHENHYDRAMINE HYDROCHLORIDE 50 MG: 50 INJECTION, SOLUTION INTRAMUSCULAR; INTRAVENOUS at 21:11

## 2025-06-16 RX ADMIN — ONDANSETRON 8 MG: 2 INJECTION, SOLUTION INTRAMUSCULAR; INTRAVENOUS at 11:47

## 2025-06-16 RX ADMIN — MIDAZOLAM 1 MG: 1 INJECTION INTRAMUSCULAR; INTRAVENOUS at 13:55

## 2025-06-16 RX ADMIN — NORETHINDRONE ACETATE 5 MG: 5 TABLET ORAL at 08:48

## 2025-06-16 RX ADMIN — DIBASIC SODIUM PHOSPHATE, MONOBASIC POTASSIUM PHOSPHATE AND MONOBASIC SODIUM PHOSPHATE 250 MG: 852; 155; 130 TABLET ORAL at 08:50

## 2025-06-16 RX ADMIN — DIBASIC SODIUM PHOSPHATE, MONOBASIC POTASSIUM PHOSPHATE AND MONOBASIC SODIUM PHOSPHATE 250 MG: 852; 155; 130 TABLET ORAL at 18:32

## 2025-06-16 RX ADMIN — Medication: at 08:58

## 2025-06-16 RX ADMIN — CYCLOBENZAPRINE 5 MG: 5 TABLET, FILM COATED ORAL at 08:48

## 2025-06-16 RX ADMIN — DIPHENHYDRAMINE HYDROCHLORIDE 25 MG: 50 INJECTION, SOLUTION INTRAMUSCULAR; INTRAVENOUS at 13:49

## 2025-06-16 RX ADMIN — MAGNESIUM SULFATE HEPTAHYDRATE 2 G: 40 INJECTION, SOLUTION INTRAVENOUS at 11:48

## 2025-06-16 RX ADMIN — MIDAZOLAM 1 MG: 1 INJECTION INTRAMUSCULAR; INTRAVENOUS at 13:48

## 2025-06-16 RX ADMIN — CETIRIZINE HYDROCHLORIDE 10 MG: 10 TABLET, FILM COATED ORAL at 20:46

## 2025-06-16 RX ADMIN — FENTANYL CITRATE 50 MCG: 50 INJECTION, SOLUTION INTRAMUSCULAR; INTRAVENOUS at 13:56

## 2025-06-16 RX ADMIN — MIDAZOLAM 1 MG: 1 INJECTION INTRAMUSCULAR; INTRAVENOUS at 13:43

## 2025-06-16 RX ADMIN — PREGABALIN 100 MG: 100 CAPSULE ORAL at 08:50

## 2025-06-16 RX ADMIN — CYCLOBENZAPRINE 5 MG: 5 TABLET, FILM COATED ORAL at 00:34

## 2025-06-16 RX ADMIN — ACETAMINOPHEN 650 MG: 325 TABLET, FILM COATED ORAL at 20:47

## 2025-06-16 RX ADMIN — CLINDAMYCIN PHOSPHATE 900 MG: 900 INJECTION, SOLUTION INTRAVENOUS at 16:24

## 2025-06-16 RX ADMIN — HYDROXYCHLOROQUINE SULFATE 200 MG: 200 TABLET, FILM COATED ORAL at 20:48

## 2025-06-16 RX ADMIN — HEPARIN SODIUM (PORCINE) LOCK FLUSH IV SOLN 100 UNIT/ML 500 UNITS: 100 SOLUTION at 14:04

## 2025-06-16 RX ADMIN — AMITRIPTYLINE HYDROCHLORIDE 75 MG: 25 TABLET, FILM COATED ORAL at 20:46

## 2025-06-16 RX ADMIN — HUMAN IMMUNOGLOBULIN G 20 G: 20 LIQUID INTRAVENOUS at 21:54

## 2025-06-16 RX ADMIN — ACETAMINOPHEN 650 MG: 325 TABLET, FILM COATED ORAL at 03:52

## 2025-06-16 RX ADMIN — SENNOSIDES AND DOCUSATE SODIUM 2 TABLET: 50; 8.6 TABLET ORAL at 20:46

## 2025-06-16 ASSESSMENT — ACTIVITIES OF DAILY LIVING (ADL)
ADLS_ACUITY_SCORE: 65

## 2025-06-16 NOTE — PRE-PROCEDURE
GENERAL PRE-PROCEDURE:   Procedure:  Port a catheter placement with moderate sedation  Date/Time:  6/16/2025 11:45 AM    Written consent obtained?: Yes    Risks and benefits: Risks, benefits and alternatives were discussed    Consent given by:  Patient  Patient states understanding of procedure being performed: Yes    Patient's understanding of procedure matches consent: Yes    Procedure consent matches procedure scheduled: Yes    Expected level of sedation:  Moderate  Appropriately NPO:  Yes  ASA Class:  3  Mallampati  :  Grade 2- soft palate, base of uvula, tonsillar pillars, and portion of posterior pharyngeal wall visible  Lungs:  Lungs clear with good breath sounds bilaterally  Heart:  Normal heart sounds and rate  History & Physical reviewed:  History and physical reviewed and no updates needed  Statement of review:  I have reviewed the lab findings, diagnostic data, medications, and the plan for sedation

## 2025-06-16 NOTE — CONSULTS
Patient is on IR schedule today Monday 6/16/25 for a Port a catheter placement with IV moderate sedation.     Nevin Alvarado is a 34 year old female with h/o CIDP who presented on 6/10/2025 with worsening leg pain, weakness, falls, back pain and headaches.     She has difficulty with IV access and will need CV access for IVIG and other medications. Port requested.     -Labs WNL for procedure.    -Orders for NPO and antibiotics have been entered.   -Procedural education reviewed with patient in detail including, but not limited to risks, benefits and alternatives, questions answered with understanding verbalized by her and consent is in IR.   -She has had a R port in the past for IVIG but didn't need them after awhile so the port was removed.   -She is concerned about getting enough sedation that works. She has had multiple Endoscopies that used moderate sedation and not always effective. IR will work with her on this.     Temp:  [98.1  F (36.7  C)-99.4  F (37.4  C)] 98.1  F (36.7  C)  Pulse:  [84-99] 84  Resp:  [17-18] 18  BP: (108-142)/(65-85) 132/79  SpO2:  [95 %-98 %] 95 %    ROUTINE ICU LABS (Last four results)  CMP  Recent Labs   Lab 06/15/25  1242 06/13/25  2346 06/13/25  0920 06/12/25  1822 06/12/25  0116 06/11/25  0847 06/09/25 2020   NA  --   --  140  --   --  140 140   POTASSIUM  --   --  4.4  --   --  4.6 3.6   CHLORIDE  --   --  107  --   --  106 104   CO2  --   --  18*  --   --  20* 25   ANIONGAP  --   --  15  --   --  14 11   GLC  --   --  197* 153* 215* 138* 88   BUN  --   --  12.7  --   --  13.1 10.7   CR  --   --  0.61  --   --  0.64 0.62   GFRESTIMATED  --   --  >90  --   --  >90 >90   KELBY  --   --  9.2  --   --  9.0 9.6   MAG  --   --  2.1  --   --   --   --    PHOS 1.9* 3.0 1.7*  --   --   --   --      CBC  Recent Labs   Lab 06/13/25  1515 06/12/25  0245 06/11/25  0847 06/09/25 2020   WBC 8.3  --  6.9 9.4   RBC 4.57  --  4.74 4.62   HGB 14.0 13.6 14.5 14.2   HCT 40.7  --  43.0 41.0    MCV 89 90 91 89   MCH 30.6  --  30.6 30.7   MCHC 34.4  --  33.7 34.6   RDW 14.1  --  14.0 13.7     --  259 293     INR  Recent Labs   Lab 06/13/25  0920   INR 1.07     Arterial Blood GasNo lab results found in last 7 days.    Please contact the IR department at [phone] for procedural related questions.     Total time: 30 minutes    Thanks, Chitra [facility] Interventional Radiology CNP ([phone]) (phone [phone])

## 2025-06-16 NOTE — PROGRESS NOTES
Neurology Progress Note   2025      Nevin Alvarado  :  1990            Interval History:      She   did get IVIG overnight and tolerated well.    Stable neurologically .  C/o positional numbness and dizziness .  Seen by OT , and did well with OT .  Requested to change benadryl pretreatment to IV , talked to pharmacy team and it  already ordered IV or PO                   Physical Exam:       , Blood pressure 132/79, pulse 84, temperature 98.1  F (36.7  C), temperature source Oral, resp. rate 18, SpO2 95%, not currently breastfeeding.  There were no vitals filed for this visit.  Vital Signs with Ranges  Temp:  [98.1  F (36.7  C)-99.4  F (37.4  C)] 98.1  F (36.7  C)  Pulse:  [84-99] 84  Resp:  [17-18] 18  BP: (108-142)/(65-85) 132/79  SpO2:  [95 %-98 %] 95 %    Neurological Exam:  General: Mental status -Alert and orientated, speech without dysarthria, language fluent with intact naming and repetition  Cranial Nerves-  Pupils equal round and reactive to light. Extraocular movements intact. No nystagmus.  Face symmetric and intact to light touch, tongue midline, palate activates symmetrically. Shoulder shrug 5/5  Motor: Normal muscle bulk and tone.  Has 5/5 strength in bilateral upper. Lowers - hip flexion 5 on right, 4 on left , knee ext 5- on right, 4+ on left,    Ankle dorsi flexion ADF 5 on rght, < 3 on left,  plantarflexion 5-/5  Sensation:   BUE intact to LT and PP   BLE :  decrease light touch   and PP  on both feet from ankles down .  Subjectively feels worse on left foot especially less sensation in dorsum of left foot.  absent vibration in left great toe and at knee   Reflexes:  patella 0.5 + , ankles 0 , toes downgoing  Cerebellar: intact FNF  Gait: Deferred   Sitting comfortable on chair         Medications:        Current Facility-Administered Medications   Medication Dose Route Frequency Provider Last Rate Last Admin    acetaminophen (TYLENOL) tablet 650 mg  650 mg Oral Q24H Stulc,  Nevin Green MD   650 mg at 06/15/25 2137    amitriptyline (ELAVIL) tablet 75 mg  75 mg Oral At Bedtime Nevin Zabala MD   75 mg at 06/15/25 2137    ammonium lactate (LAC-HYDRIN) 12 % lotion   Topical BID Alma VaughanDO amadeo   Given at 06/16/25 0858    [Held by provider] apixaban ANTICOAGULANT (ELIQUIS) tablet 5 mg  5 mg Oral BID Toya Powell PA-C   5 mg at 06/11/25 0842    busPIRone (BUSPAR) tablet 15 mg  15 mg Oral BID Toya Powell PA-C   15 mg at 06/16/25 0850    cetirizine (zyrTEC) tablet 10 mg  10 mg Oral BID Toya Powell PA-C   10 mg at 06/16/25 0850    diphenhydrAMINE (BENADRYL) capsule 50 mg  50 mg Oral Q24H Nevin Zabala MD        Or    diphenhydrAMINE (BENADRYL) injection 50 mg  50 mg Intravenous Q24H Nevin Zabala MD   50 mg at 06/15/25 2137    hydroxychloroquine (PLAQUENIL) tablet 200 mg  200 mg Oral BID Toya Powell PA-C   200 mg at 06/16/25 0850    immune globulin - sucrose free 10% (PRIVIGEN) infusion 20 g  0.4 g/kg (Ideal) Intravenous Q24H Alexandria Mcwilliams  mL/hr at 06/15/25 2220 20 g at 06/15/25 2220    lidocaine (PF) (XYLOCAINE) 1 % injection 30 mL  30 mL Intradermal Once Nevin Gamble MD        norethindrone (AYGESTIN) tablet 5 mg  5 mg Oral Daily Toya Powell PA-C   5 mg at 06/16/25 0848    pantoprazole (PROTONIX) EC tablet 40 mg  40 mg Oral Daily Toya Powell PA-C   40 mg at 06/16/25 0850    phosphorus tablet 250 mg (PHOSPHA 250 NEUTRAL) per tablet 250 mg  250 mg Oral 4x Daily Deanna Severino MD   250 mg at 06/16/25 0850    polyethylene glycol (MIRALAX) Packet 17 g  17 g Oral TID Keyonna Caban PA-C   17 g at 06/15/25 1654    pregabalin (LYRICA) capsule 100 mg  100 mg Oral QAToya Patel PA-C   100 mg at 06/16/25 0850    pregabalin (LYRICA) capsule 100 mg  100 mg Oral Daily with lunch Toya Powell PA-C   100 mg at 06/15/25 1218    pregabalin  (LYRICA) capsule 200 mg  200 mg Oral QPM Toya Powell PA-C   200 mg at 06/15/25 2013    senna-docusate (SENOKOT-S/PERICOLACE) 8.6-50 MG per tablet 1 tablet  1 tablet Oral BID Toya Powell PA-C   1 tablet at 06/14/25 2001    Or    senna-docusate (SENOKOT-S/PERICOLACE) 8.6-50 MG per tablet 2 tablet  2 tablet Oral BID Toya Powell PA-C   2 tablet at 06/16/25 0848    sodium chloride (PF) 0.9% PF flush 10 mL  10 mL Intravenous Once Nevin Gamble MD        sodium chloride (PF) 0.9% PF flush 3 mL  3 mL Intracatheter Q8H TOSHIA Toya Powell PA-C   3 mL at 06/15/25 1333     PRN Meds:   Current Facility-Administered Medications   Medication Dose Route Frequency Provider Last Rate Last Admin    acetaminophen (TYLENOL) tablet 650 mg  650 mg Oral Q4H PRN Toya Powell PA-C   650 mg at 06/16/25 0849    Or    acetaminophen (TYLENOL) Suppository 650 mg  650 mg Rectal Q4H PRN Toya Powell PA-C        albuterol (PROVENTIL HFA/VENTOLIN HFA) inhaler  1-2 puff Inhalation Once PRN Nevin Zabala MD        albuterol (PROVENTIL) neb solution 2.5 mg  2.5 mg Nebulization Once PRN Nevin Zabala MD        albuterol (PROVENTIL) neb solution 2.5 mg  2.5 mg Nebulization Q6H PRN Toya Powell PA-C        alum & mag hydroxide-simethicone (MAALOX) suspension 30 mL  30 mL Oral Q4H PRN Toya Powell PA-C        bisacodyl (DULCOLAX) suppository 10 mg  10 mg Rectal Daily PRN Toya Powell PA-C        calcium carbonate (TUMS) chewable tablet 1,000 mg  1,000 mg Oral 4x Daily PRN Toya Powell PA-C        clonazePAM (klonoPIN) tablet 0.5 mg  0.5 mg Oral Daily PRN Toya Powell PA-C        cyclobenzaprine (FLEXERIL) tablet 5 mg  5 mg Oral TID PRN Toya Powell PA-C   5 mg at 06/16/25 0848    glucose gel 15-30 g  15-30 g Oral Q15 Min PRN Osito Pena PA-C        Or    dextrose 50 %  injection 25-50 mL  25-50 mL Intravenous Q15 Min PRN Osito Pena PA-C        Or    glucagon injection 1 mg  1 mg Subcutaneous Q15 Min PRN Osito Pena PA-C        diphenhydrAMINE (BENADRYL) 25 mg in sodium chloride 0.9 % 55.5 mL intermittent infusion  25 mg Intravenous Q12H PRN StulNevin vargas  mL/hr at 06/15/25 1752 25 mg at 06/15/25 1752    diphenhydrAMINE (BENADRYL) injection 25 mg  25 mg Intravenous Once PRN Vj, Nevin Green MD        Or    diphenhydrAMINE (BENADRYL) injection 50 mg  50 mg Intravenous Once PRN Vj, Nevin Green MD        EPINEPHrine (ADRENALIN) kit 0.3 mg  0.3 mg Intramuscular Q5 Min PRN Vj, Nevin Green MD        famotidine (PEPCID) injection 20 mg  20 mg Intravenous Once PRN StulNevin vargas MD        hydrOXYzine HCl (ATARAX) tablet 25 mg  25 mg Oral Q8H PRN Toya Powell PA-C   25 mg at 06/16/25 0034    lidocaine (LMX4) cream   Topical Q1H PRN Maik Hess MD        lidocaine 1 % 0.1-1 mL  0.1-1 mL Other Q1H PRN Toya Powell PA-C        lidocaine 1 % 0.1-5 mL  0.1-5 mL Other Q1H PRN Maik Hess MD        magnesium citrate solution 148 mL  148 mL Oral Once PRN Alma Vaughan DO        magnesium sulfate 2 g in 50 mL sterile water intermittent infusion  2 g Intravenous Q12H PRN StulNevin vargas MD        meperidine (DEMEROL) injection 25 mg  25 mg Intravenous Once PRN StulNevin vargas MD        methylPREDNISolone sodium succinate (SOLU-MEDROL) injection 40 mg  40 mg Intravenous Once PRN StulNevin vargas MD        naloxone (NARCAN) injection 0.2 mg  0.2 mg Intravenous Q2 Min PRN Alexandria Mcwilliams MD        Or    naloxone (NARCAN) injection 0.4 mg  0.4 mg Intravenous Q2 Min PRN Alexandria Mcwilliams MD        Or    naloxone (NARCAN) injection 0.2 mg  0.2 mg Intramuscular Q2 Min PRN Alexandria Mcwilliams MD        Or    naloxone (NARCAN) injection 0.4 mg  0.4 mg  Intramuscular Q2 Min PRN Alexandria Mcwilliams MD        ondansetron (ZOFRAN ODT) ODT tab 4 mg  4 mg Oral Q6H PRN Toya Powell PA-C   4 mg at 06/15/25 1752    ondansetron (ZOFRAN) injection 8 mg  8 mg Intravenous Q8H PRN Nevin Zabala MD        Patient is already receiving anticoagulation with heparin, enoxaparin (LOVENOX), warfarin (COUMADIN)  or other anticoagulant medication   Does not apply Continuous PRN Toya Powell PA-C        senna-docusate (SENOKOT-S/PERICOLACE) 8.6-50 MG per tablet 1 tablet  1 tablet Oral BID PRN Toya Powell PA-C        Or    senna-docusate (SENOKOT-S/PERICOLACE) 8.6-50 MG per tablet 2 tablet  2 tablet Oral BID PRN Toya Powell PA-C        sodium chloride (PF) 0.9% PF flush 10-40 mL  10-40 mL Intracatheter Once PRN Maik Hess MD        sodium chloride (PF) 0.9% PF flush 3 mL  3 mL Intracatheter q1 min prn Toya Powell PA-C        sodium chloride 0.9% BOLUS 1,000 mL  1,000 mL Intravenous Once PRN Nevin Zabala MD                Data:      All new lab and imaging data was reviewed.     Negative oligoclonal bands         Assessment and Plan:          - Hx of CIDP,axonal variant ,  worsening leg strength, possible flare.     - LP reassuring with only slight protein elevation - plan to continue  IVIG for CIDP flare        - Small vessel disease on MRI brain : stable MRI brain 6/12/2025  reassuring with no acute CNS process.  Negative MRI cervical and thoracic spinal cords .  Negative CSF oligoclonal bands .       - Headaches: tension in description and could be cervicogenic, vs transformed migraine     RECOMMENDATIONS      - continue  IVIG 0.4g/kg x 5 days  - Day 1 - 6/14    , tonight will be day 3 .      - access still concern, declined further PICC trails, plan to continue with PIV, discussed with IR and consult placed for port for today , reviewed risks of port -  NPO today and blood thinner being  held for port   - mild hypophosphatemia :  continue phosphorus supplements as per primary team     - continue PT/OT   - Headaches - continue Lyrica, okay to continue prednisone 60mg x 5 days ( started 6/12),   Dr olivares increased amitriptyline to 75mg 6/14 at night and tolerating could increase to 100mg  Continue  PRN migraine cocktail  Zofran/Mag/Benadryl   Consider muscle relaxing med next     - Elevated ESR/CRP   improved from 20.8 to 4.4  - has had in past as well uncertain cause as no clear infection - pending labs ORVILLE/JULIA panel          Hx of PE (4/2024 and 2019)  - hold PTA eliquis was held for LP, now being held for port   Defer to primary team on needed other coverage while being held for procedure     Neuro will continue to follow   After last IVIG she will follow up with dr Chance in [facility] clinic  of neurology noted plans for outpatient EMG     50 min spent on review of chart, exam, care coordination, and documentation on date of service     April Ro MD

## 2025-06-16 NOTE — PLAN OF CARE
Reason for Admission: CIDP flare up    Cognitive/Mentation: A/Ox 4, anxious  Neuros/CMS: Intact ex BLE weakness  VS: Stable on RA.   /GI: Continent. Last BM 6/15.   Pulmonary: LS clear.  Pain: L flank pain, headache, chest pain d/t port placement. PRN Tylenol, Flexeril, & Magnesium Sulfate given. Toradol given after port placement. Heat packs & ice packs applied.    Drains/Lines: PIV SL  Skin: intact ex dry feet  Activity: SBA with walker.  Diet: Regular with thin liquids. Takes pills whole.     Therapies recs: home   Discharge: pending IVIG completion    Aggression Stoplight Tool: green    End of shift summary: Port placed this afternoon. Pt stable ex anxious with ongoing headache and incisional pain from port placement.

## 2025-06-16 NOTE — IR NOTE
Interventional Radiology Intra-procedural Nursing Note    Patient Name: Nevin Alvarado  Medical Record Number: [id number]  Today's Date: June 16, 2025    Procedure: Right Chest Port Placement under Moderate Sedation  Start time: 1348  End time: 1409  Report provided to: Liseth MORALES  Patient depart time and location: 1420 to 1722    Note: Patient entered Interventional Radiology Suite number 1 via cart. Patient awake, alert and orientated. Assisted onto procedural table in supine position. Prepped and draped.  Dr. Billy in room. Time out and procedure started. Vital signs stable. Telemetry reading sinus tachycardia.    Procedure well tolerated by patient without complications. Procedure end with debrief by Dr. Billy.      Administered medication totals:  Lidocaine 1% 5 mL Intradermal  Lidocaine with Epinephrine 5 mL Intradermal  Heparin 500 Units Port Lock  Versed 4 mg IVP  Fentanyl 200 mcg IVP  Benadryl 25 mg IVP     Last dose of sedation administered at 1401.

## 2025-06-16 NOTE — IR NOTE
Procedure Note for IR Procedure Dictation  Procedure Port Placement  Conscious sedation: 4 mg IVP Versed, 200 mcg IVP fentanyl  Sedation time: 21 minutes  Fluoro time: 1.3 minutes  Total Fluoro Dose: 11.00 mGy (Air Kerma)  Contrast: 0 ml 0  Local anesthetic: 5 ml 1% lidocaine; 5mL 1% Lidocaine w/ EPI

## 2025-06-16 NOTE — PROGRESS NOTES
"SPIRITUAL HEALTH SERVICES - Progress Note  [facility] Neuro    Referral Source: Follow Up    - I had a reflective conversation with Nevin about her in-patient stay so far and \"how anxiety producing it can be.\"  - Nevin's involvement with Jehovah's witness and other ministry is a helpful \"distraction\" from her current medical condition.  - She has also been admitted to the hospital previously when her condition \"flared up the same time last year.\"  - Nevin described the nature of her mental health issues, and how difficult it is to be \"anxious about being anxious.\"  - Pt welcomed prayer concerning her illness.    Plan: Nevin is aware of Spiritual Health Services and will reach out with any needs.      Jim Enriquez    Intern    [facility] available 24/7 for emergent requests/referrals, either by paging the on-call  or by entering an ASAP/STAT consult in Epic (this will also page the on-call ).   "

## 2025-06-16 NOTE — PLAN OF CARE
Reason for Admission: CIDP flare up.     Cognitive/Mentation: A/O x4.   Neuros/CMS: Intact ex BLE weakness.   VS: VSS on RA.   GI/: Continent.   Pulmonary: LS clear.   Pain: L flank pain- PRN Tylenol, Flexeril, and MD mad[place], UA ordered and collected.    Drains/Lines: PIV SL.   Skin: Intact.   Activity: Assist x1 GB/W  Diet: NPO since midnight otherwise regular with thin liquids. Takes pills whole.     Therapies recs: home.   Discharge: pending IVIG completion.     Aggression Spotlight Tool:  green    End of shift summary: 2nd dose of IVIG given last night. Pt has been NPO since midnight for port placement. UA collected for L flank pain.

## 2025-06-16 NOTE — SIGNIFICANT EVENT
Significant Event Note    Time of event: 4:16 AM June 16, 2025    Description of event:  Patient reporting left flank pain overnight ongoing from the day prior. Of note she has a significant history of constipation and was seen earlier in her stay by colorectal surgery. Reportedly her constipation has been improving. Ct from 5/20 without any renal calculi.     Plan:  Follow up UA      Discussed with: bedside nurse    Renetta Voss MD

## 2025-06-16 NOTE — PROGRESS NOTES
[facility]    Medicine Progress Note - Hospitalist Service    Date of Admission:  6/10/2025    Assessment & Plan     Nevin Alvarado is a 34 year old female with h/o CIDP who presented on 6/10/2025 with worsening leg pain, weakness, falls, back pain and headaches.        Worsening neuropathy with falls in patient with chronic inflammatory demyelinating polyneuropathy - likely due to CIDP flare up  -CIDP diagnosed in 2015, characterized by the lower extremities weakness, L > R. Has been treated with weekly or biweekly IVIG [id number]. She discontinued IVIG due to lack of efficacy. Had worsening symptoms in 2024 due to flare up, treated with plasma exchange x 7 sessions, with some improvement in strength  -follows with Dr. Chance of [facility], refer to detailed note from 5/15/25. She has appointment for EMG on 6/30 and has been referred to medical genetics.   -she typically experiences lower extremity weakness, L > R and has foot drop, worse on L. Tremors in hands, tingling and numbness in arms and sensitivity to pressure causing numbness from arms to feet.   -recent outpatient work up 5/15 showed   -normal Vit D, B12, Methylmalonic acid 0.14. 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 Nonspecific changes in the brain parenchyma could be seen in the setting of migrainous symptoms.   -MRI lumbar spine showed - diffuse thickening of nerve roots with prominence to exiting nerve roots. Findings consistent with CIDP.   -MRI cervical and thoracic spine showed - No cord signal abnormality or abnormal cord enhancement     -CRP 7.6 (5/15) -> 20.8 (6/10) ->4.4 (6/13)  -ESR > 140 (6/10) -> 16  (6/13)  -LP 6/13 showed 0 nucleated cells, elevated protein 58.9  -JULIA/ORVILLE pending     - General Neurology following. Started IVIG on 6/14.   -continues to have issues with Iv access. Planning prot-a-cath by IR on 6/16. Eliquis on hold.  -PT OT    Persistent Headaches: tension vs cervicogenic, vs transformed migraine  -headaches have been worsening for a while. Did not improve with toradol  -On brain MRI, had changes suggestive of migraine  -has been receiving Iv magnesium and systemic steroids  -lyrica dose increased, amitriptyline dose increased   -prn zofran/benadryl available       Constipation, improved  Hx of thrombosed hemorrhoids: Hx of this, reports no BM X1 week, not passing flatus. Some abdominal pain in lower abdomen radiating to LUQ and epigastric region. Reports that she has trialed colace, miralax, suppositories, manual disimpaction outpatient with no relief. Not on narcotics. ? If related to mounjaro. Recently had a colonoscopy with [facility] which was reported as normal, but was referred to Colorectal Surgery for thrombosed hemorrhoids. No intervention performed as pt is on Eliquis. She has subsequently developed a few more hemorrhoids.   *6/10 KUB moderate stool burden  -Colorectal Surgery consulted for significant constipation, appreciate recommendations, signed off on 6/12  -Senokot BID, Miralax TID, PRN suppository and mag citrate available              -reduce as needed  -*good BM 6/11 midday and overnight, but still feels constipated on 6/12     Atypical chest pain- resolved  Localized to center of the chest, described as a burning. No radiation. Not aggravated with activity. No SOB. Vitals stable. No cardiac hx. ? GERD.   *EKG: sinus  - PRN maalox     Hx of PE (4/2024 and 2019)  - hold PTA eliquis for port placement,     Obesity: PTA Adriel has been on hold in the setting of recent outpatient procedures (colonoscopy, endoscopy, knee replacement), pt tried restarting, but became acutely ill with  "N/V, thus has remained off for now and will work with her outpatient weight management team to discuss resumption.      Fibromyalgia: Follows with [facility]. Has been put on Plaquenil 200 mg BID for unclear rheumatologic process. Defer management to them. Will continue for now.      ZOHRA: Does not tolerate CPAP      Generalized anxiety disorder   Borderline personality disorder   MDD   PTSD  Hx of self injurious behavior: Noted per Psychiatry note 4/2024. Reports stable mental health.   - Resume PTA meds including Buspar, Amitriptyline, PRN Klonopin and Atarax        Diet: Regular Diet Adult  NPO for Procedure/Surgery per Anesthesia Guidelines Except for: Meds; Clear liquids before procedure/surgery: ADULT (Age GREATER than or Equal to 18 years) - Clear liquids 2 hours before procedure/surgery    DVT Prophylaxis: DOAC/lovenox   Hazel Catheter: Not present  Lines: None     Cardiac Monitoring: None  Code Status: Full Code      Clinically Significant Risk Factors                              # Morbid Obesity: Estimated body mass index is 41.34 kg/m  as calculated from the following:    Height as of 6/1/25: 1.575 m (5' 2\").    Weight as of 6/9/25: 102.5 kg (226 lb).        # Financial/Environmental Concerns: none         Social Drivers of Health    Depression: At risk (3/12/2025)    Received from [facility]    PHQ-2     PHQ-2 TOTAL SCORE: 3   Financial Resource Strain: Medium Risk (3/2/2025)    [facility]    Financial Resource Strain     Difficulty of Paying Living Expenses: 2     Difficulty of Paying Living Expenses: 1   Transportation Needs: Unmet Transportation Needs (3/2/2025)    Received from [facility]    Transportation Needs     Does lack of transportation keep you from medical appointments?: 1     Does lack of transportation keep you from work, meetings or getting things that you need?: 2 "   Physical Activity: Insufficiently Active (11/13/2024)    Received from [facility]    Exercise Vital Sign     Days of Exercise per Week: 3 days     Minutes of Exercise per Session: 10 min          Disposition Plan     Medically Ready for Discharge: Anticipated in 2-4 Days             Deanna Severino MD  Hospitalist Service  [facility]  Securely message with [employer] (more info)  Text page via [employer] Paging/Directory   ______________________________________________________________________    Interval History     Feels ok. Received Will IVIG yesterday  Has headache again today        Physical Exam   Vital Signs: Temp: 99.2  F (37.3  C) Temp src: Oral BP: (!) 142/85 Pulse: 88   Resp: 17 SpO2: 98 % O2 Device: None (Room air)    Weight: 0 lbs 0 oz    Constitutional - awake and alert, resting in bed, in no acute distress  Cardiovascular - regular rate and rhythm, no murmurs  Pulmonary - lungs are clear to auscultation bilaterally, no wheezing or rhonchi  Integumentary - skin is warm and dry, no rashes or ulcers  Neurological - awake, alert and oriented x3.  Moving all 4 extremities, normal speech, bilateral foot drop    Medical Decision Making       45 MINUTES SPENT BY ME on the date of service doing chart review, history, exam, documentation & further activities per the note.      Data         Imaging results reviewed over the past 24 hrs:   No results found for this or any previous visit (from the past 24 hours).

## 2025-06-17 ENCOUNTER — APPOINTMENT (OUTPATIENT)
Dept: PHYSICAL THERAPY | Facility: CLINIC | Age: 35
DRG: 074 | End: 2025-06-17
Payer: COMMERCIAL

## 2025-06-17 LAB
ENA SM IGG SER IA-ACNC: <0.7 U/ML
ENA SM IGG SER IA-ACNC: NEGATIVE
ENA SS-A AB SER IA-ACNC: 5.2 U/ML
ENA SS-A AB SER IA-ACNC: NEGATIVE
ENA SS-B IGG SER IA-ACNC: <0.6 U/ML
ENA SS-B IGG SER IA-ACNC: NEGATIVE
U1 SNRNP IGG SER IA-ACNC: 1.5 U/ML
U1 SNRNP IGG SER IA-ACNC: NEGATIVE

## 2025-06-17 PROCEDURE — 250N000013 HC RX MED GY IP 250 OP 250 PS 637: Performed by: INTERNAL MEDICINE

## 2025-06-17 PROCEDURE — 99232 SBSQ HOSP IP/OBS MODERATE 35: CPT | Performed by: INTERNAL MEDICINE

## 2025-06-17 PROCEDURE — 250N000011 HC RX IP 250 OP 636: Performed by: INTERNAL MEDICINE

## 2025-06-17 PROCEDURE — 250N000013 HC RX MED GY IP 250 OP 250 PS 637

## 2025-06-17 PROCEDURE — 120N000001 HC R&B MED SURG/OB

## 2025-06-17 PROCEDURE — 250N000013 HC RX MED GY IP 250 OP 250 PS 637: Performed by: PHYSICIAN ASSISTANT

## 2025-06-17 PROCEDURE — 250N000013 HC RX MED GY IP 250 OP 250 PS 637: Performed by: PSYCHIATRY & NEUROLOGY

## 2025-06-17 PROCEDURE — 250N000011 HC RX IP 250 OP 636: Mod: JZ | Performed by: STUDENT IN AN ORGANIZED HEALTH CARE EDUCATION/TRAINING PROGRAM

## 2025-06-17 PROCEDURE — 97530 THERAPEUTIC ACTIVITIES: CPT | Mod: GP

## 2025-06-17 PROCEDURE — 250N000011 HC RX IP 250 OP 636: Mod: JW | Performed by: INTERNAL MEDICINE

## 2025-06-17 PROCEDURE — 97116 GAIT TRAINING THERAPY: CPT | Mod: GP

## 2025-06-17 PROCEDURE — 250N000011 HC RX IP 250 OP 636: Performed by: PSYCHIATRY & NEUROLOGY

## 2025-06-17 RX ORDER — HEPARIN SODIUM,PORCINE 10 UNIT/ML
5-10 VIAL (ML) INTRAVENOUS EVERY 24 HOURS
Status: DISCONTINUED | OUTPATIENT
Start: 2025-06-17 | End: 2025-06-19 | Stop reason: HOSPADM

## 2025-06-17 RX ORDER — HYDROMORPHONE HYDROCHLORIDE 1 MG/ML
0.3 INJECTION, SOLUTION INTRAMUSCULAR; INTRAVENOUS; SUBCUTANEOUS ONCE
Status: COMPLETED | OUTPATIENT
Start: 2025-06-17 | End: 2025-06-17

## 2025-06-17 RX ORDER — HEPARIN SODIUM,PORCINE 10 UNIT/ML
5-10 VIAL (ML) INTRAVENOUS
Status: DISCONTINUED | OUTPATIENT
Start: 2025-06-17 | End: 2025-06-19 | Stop reason: HOSPADM

## 2025-06-17 RX ORDER — HYDROMORPHONE HYDROCHLORIDE 2 MG/1
2 TABLET ORAL
Refills: 0 | Status: DISCONTINUED | OUTPATIENT
Start: 2025-06-17 | End: 2025-06-19 | Stop reason: HOSPADM

## 2025-06-17 RX ORDER — HEPARIN SODIUM (PORCINE) LOCK FLUSH IV SOLN 100 UNIT/ML 100 UNIT/ML
5-10 SOLUTION INTRAVENOUS
Status: DISCONTINUED | OUTPATIENT
Start: 2025-06-17 | End: 2025-06-19 | Stop reason: HOSPADM

## 2025-06-17 RX ORDER — HYDROMORPHONE HYDROCHLORIDE 1 MG/ML
0.3 INJECTION, SOLUTION INTRAMUSCULAR; INTRAVENOUS; SUBCUTANEOUS EVERY 4 HOURS PRN
Status: DISCONTINUED | OUTPATIENT
Start: 2025-06-17 | End: 2025-06-19 | Stop reason: HOSPADM

## 2025-06-17 RX ORDER — LIDOCAINE 40 MG/G
CREAM TOPICAL
Status: DISCONTINUED | OUTPATIENT
Start: 2025-06-17 | End: 2025-06-19 | Stop reason: HOSPADM

## 2025-06-17 RX ORDER — HYDROMORPHONE HYDROCHLORIDE 1 MG/ML
0.3 INJECTION, SOLUTION INTRAMUSCULAR; INTRAVENOUS; SUBCUTANEOUS ONCE
Refills: 0 | Status: COMPLETED | OUTPATIENT
Start: 2025-06-17 | End: 2025-06-17

## 2025-06-17 RX ORDER — KETOROLAC TROMETHAMINE 15 MG/ML
15 INJECTION, SOLUTION INTRAMUSCULAR; INTRAVENOUS ONCE
Status: DISCONTINUED | OUTPATIENT
Start: 2025-06-17 | End: 2025-06-17

## 2025-06-17 RX ORDER — HYDROMORPHONE HYDROCHLORIDE 1 MG/ML
0.5 INJECTION, SOLUTION INTRAMUSCULAR; INTRAVENOUS; SUBCUTANEOUS EVERY 4 HOURS PRN
Status: DISCONTINUED | OUTPATIENT
Start: 2025-06-17 | End: 2025-06-19 | Stop reason: HOSPADM

## 2025-06-17 RX ADMIN — BUSPIRONE HYDROCHLORIDE 15 MG: 15 TABLET ORAL at 09:03

## 2025-06-17 RX ADMIN — HYDROMORPHONE HYDROCHLORIDE 0.3 MG: 1 INJECTION, SOLUTION INTRAMUSCULAR; INTRAVENOUS; SUBCUTANEOUS at 18:49

## 2025-06-17 RX ADMIN — DIBASIC SODIUM PHOSPHATE, MONOBASIC POTASSIUM PHOSPHATE AND MONOBASIC SODIUM PHOSPHATE 250 MG: 852; 155; 130 TABLET ORAL at 12:21

## 2025-06-17 RX ADMIN — HYDROMORPHONE HYDROCHLORIDE 0.3 MG: 1 INJECTION, SOLUTION INTRAMUSCULAR; INTRAVENOUS; SUBCUTANEOUS at 10:41

## 2025-06-17 RX ADMIN — Medication 5 ML: at 23:11

## 2025-06-17 RX ADMIN — HYDROMORPHONE HYDROCHLORIDE 0.3 MG: 1 INJECTION, SOLUTION INTRAMUSCULAR; INTRAVENOUS; SUBCUTANEOUS at 02:44

## 2025-06-17 RX ADMIN — HYDROMORPHONE HYDROCHLORIDE 0.3 MG: 1 INJECTION, SOLUTION INTRAMUSCULAR; INTRAVENOUS; SUBCUTANEOUS at 14:51

## 2025-06-17 RX ADMIN — PREGABALIN 100 MG: 100 CAPSULE ORAL at 12:21

## 2025-06-17 RX ADMIN — POLYETHYLENE GLYCOL 3350 17 G: 17 POWDER, FOR SOLUTION ORAL at 09:02

## 2025-06-17 RX ADMIN — PREGABALIN 200 MG: 100 CAPSULE ORAL at 20:47

## 2025-06-17 RX ADMIN — BUSPIRONE HYDROCHLORIDE 15 MG: 15 TABLET ORAL at 20:47

## 2025-06-17 RX ADMIN — DIBASIC SODIUM PHOSPHATE, MONOBASIC POTASSIUM PHOSPHATE AND MONOBASIC SODIUM PHOSPHATE 250 MG: 852; 155; 130 TABLET ORAL at 23:05

## 2025-06-17 RX ADMIN — HYDROMORPHONE HYDROCHLORIDE 0.3 MG: 1 INJECTION, SOLUTION INTRAMUSCULAR; INTRAVENOUS; SUBCUTANEOUS at 23:05

## 2025-06-17 RX ADMIN — DIBASIC SODIUM PHOSPHATE, MONOBASIC POTASSIUM PHOSPHATE AND MONOBASIC SODIUM PHOSPHATE 250 MG: 852; 155; 130 TABLET ORAL at 09:03

## 2025-06-17 RX ADMIN — CETIRIZINE HYDROCHLORIDE 10 MG: 10 TABLET, FILM COATED ORAL at 20:47

## 2025-06-17 RX ADMIN — AMITRIPTYLINE HYDROCHLORIDE 75 MG: 25 TABLET, FILM COATED ORAL at 22:15

## 2025-06-17 RX ADMIN — DIBASIC SODIUM PHOSPHATE, MONOBASIC POTASSIUM PHOSPHATE AND MONOBASIC SODIUM PHOSPHATE 250 MG: 852; 155; 130 TABLET ORAL at 20:52

## 2025-06-17 RX ADMIN — HYDROXYCHLOROQUINE SULFATE 200 MG: 200 TABLET, FILM COATED ORAL at 20:47

## 2025-06-17 RX ADMIN — PANTOPRAZOLE SODIUM 40 MG: 40 TABLET, DELAYED RELEASE ORAL at 09:03

## 2025-06-17 RX ADMIN — APIXABAN 5 MG: 5 TABLET, FILM COATED ORAL at 20:47

## 2025-06-17 RX ADMIN — HYDROMORPHONE HYDROCHLORIDE 0.3 MG: 1 INJECTION, SOLUTION INTRAMUSCULAR; INTRAVENOUS; SUBCUTANEOUS at 06:03

## 2025-06-17 RX ADMIN — DIPHENHYDRAMINE HYDROCHLORIDE 50 MG: 50 INJECTION, SOLUTION INTRAMUSCULAR; INTRAVENOUS at 21:30

## 2025-06-17 RX ADMIN — NORETHINDRONE ACETATE 5 MG: 5 TABLET ORAL at 09:03

## 2025-06-17 RX ADMIN — CETIRIZINE HYDROCHLORIDE 10 MG: 10 TABLET, FILM COATED ORAL at 09:03

## 2025-06-17 RX ADMIN — HYDROXYCHLOROQUINE SULFATE 200 MG: 200 TABLET, FILM COATED ORAL at 09:03

## 2025-06-17 RX ADMIN — PREGABALIN 100 MG: 100 CAPSULE ORAL at 09:03

## 2025-06-17 RX ADMIN — APIXABAN 5 MG: 5 TABLET, FILM COATED ORAL at 09:03

## 2025-06-17 RX ADMIN — HUMAN IMMUNOGLOBULIN G 20 G: 20 LIQUID INTRAVENOUS at 22:09

## 2025-06-17 RX ADMIN — SENNOSIDES AND DOCUSATE SODIUM 1 TABLET: 50; 8.6 TABLET ORAL at 09:03

## 2025-06-17 RX ADMIN — ACETAMINOPHEN 650 MG: 325 TABLET, FILM COATED ORAL at 09:53

## 2025-06-17 ASSESSMENT — ACTIVITIES OF DAILY LIVING (ADL)
ADLS_ACUITY_SCORE: 65

## 2025-06-17 NOTE — PLAN OF CARE
Reason for Admission: CIDP flareup    Cognitive/Mentation: A/Ox 4, anxious  Neuros/CMS: Intact ex weak bilateral dorsiflexion, L foot baseline numbness to top of foot  VS: VSS on RA.   Tele: n/a.  /GI: Continent. Last BM 6/17/25, loose.   Pulmonary: LS clear.  Pain: Complains of pain around chest port, Tylenol given. Appears comfortable. Requests dilaudid every 4 hours.    Drains/Lines: PIV SL and chest port SL  Skin: scattered bruising, cracked skin to feet  Activity: Assist x 1 with walker gb.  Diet: Regular with thin liquids. Takes pills whole with water.     Therapies recs: home with homecare  Discharge: pending    Aggression Stoplight Tool: green    End of shift summary: Pt to get IVIG 4 of 5 tonight.

## 2025-06-17 NOTE — PROGRESS NOTES
Neurology Progress Note   2025      Nevin Alvarado  :  1990            Interval History:        Over the last 24 hours she is stable neurologically .  Stable CIDP with decreased sensation bilateral feet.  Had  a Port a catheter placement  yesterday, awaiting her IVIG treatment tonight    Some pain at night due to Port-A-Cath with hydromorphone 0.3 mg  That amitriptyline and Lyrica current increase are helping and she like to continue them the same dose on discharge.    Walked with Physical therapy with a walker                    Physical Exam:       , Blood pressure 136/79, pulse 99, temperature 98.1  F (36.7  C), temperature source Oral, resp. rate 20, SpO2 98%, not currently breastfeeding.  There were no vitals filed for this visit.  Vital Signs with Ranges  Temp:  [98.1  F (36.7  C)-98.8  F (37.1  C)] 98.1  F (36.7  C)  Pulse:  [77-99] 99  Resp:  [16-20] 20  BP: (118-148)/(73-85) 136/79  SpO2:  [95 %-100 %] 98 %    Neurological Exam:  General: Mental status -Alert and orientated, speech without dysarthria, language fluent with intact naming and repetition  Cranial Nerves-  Pupils equal round and reactive to light. Extraocular movements intact. No nystagmus.  Face symmetric and intact to light touch, tongue midline, palate activates symmetrically. Shoulder shrug 5/5  Motor: Normal muscle bulk and tone.  Has 5/5 strength in bilateral upper. Lowers - hip flexion 5 on right, 4 on left , knee ext 5- on right, 4+ on left,    Ankle dorsi flexion ADF 5 on rght, < 3 on left,  plantarflexion 5-/5  Sensation:   BUE intact to LT and PP   BLE :  decrease light touch   and PP  on both feet from ankles down .  Subjectively feels worse on left foot especially less sensation in dorsum of left foot.  absent vibration in left great toe and at knee   Reflexes:  patella 0.5 + , ankles 0 , toes downgoing  Cerebellar: intact FNF  Gait: Deferred   Sitting comfortable on chair         Medications:        Current  Facility-Administered Medications   Medication Dose Route Frequency Provider Last Rate Last Admin    amitriptyline (ELAVIL) tablet 75 mg  75 mg Oral At Bedtime Nevin Zabala MD   75 mg at 06/16/25 2046    ammonium lactate (LAC-HYDRIN) 12 % lotion   Topical BID Alma VaughanDO ericka   Given at 06/16/25 0858    apixaban ANTICOAGULANT (ELIQUIS) tablet 5 mg  5 mg Oral BID Deanna Severino MD   5 mg at 06/17/25 0903    busPIRone (BUSPAR) tablet 15 mg  15 mg Oral BID Toya Powell PA-C   15 mg at 06/17/25 0903    cetirizine (zyrTEC) tablet 10 mg  10 mg Oral BID Toya Powell PA-C   10 mg at 06/17/25 0903    hydroxychloroquine (PLAQUENIL) tablet 200 mg  200 mg Oral BID Toya Powell PA-C   200 mg at 06/17/25 0903    immune globulin - sucrose free 10% (PRIVIGEN) infusion 20 g  0.4 g/kg (Ideal) Intravenous Q24H Alexandria Mcwilliams  mL/hr at 06/15/25 2220 20 g at 06/16/25 2154    lidocaine (PF) (XYLOCAINE) 1 % injection 30 mL  30 mL Intradermal Once Nevin Gamble MD        norethindrone (AYGESTIN) tablet 5 mg  5 mg Oral Daily Toya Powell PA-C   5 mg at 06/17/25 0903    pantoprazole (PROTONIX) EC tablet 40 mg  40 mg Oral Daily Toya Powell PA-C   40 mg at 06/17/25 0903    phosphorus tablet 250 mg (PHOSPHA 250 NEUTRAL) per tablet 250 mg  250 mg Oral 4x Daily Deanna Severino MD   250 mg at 06/17/25 1221    polyethylene glycol (MIRALAX) Packet 17 g  17 g Oral TID Keyonna Caban PA-C   17 g at 06/17/25 0902    pregabalin (LYRICA) capsule 100 mg  100 mg Oral QA Toya Powell PA-C   100 mg at 06/17/25 0903    pregabalin (LYRICA) capsule 100 mg  100 mg Oral Daily with lunch Toya Powell PA-C   100 mg at 06/17/25 1221    pregabalin (LYRICA) capsule 200 mg  200 mg Oral QPM Toya Powell PA-C   200 mg at 06/16/25 2047    senna-docusate (SENOKOT-S/PERICOLACE) 8.6-50 MG per tablet 1 tablet  1 tablet Oral BID Sherry  Toya Rothman PA-C   1 tablet at 06/17/25 0903    Or    senna-docusate (SENOKOT-S/PERICOLACE) 8.6-50 MG per tablet 2 tablet  2 tablet Oral BID Toya Powell PA-C   2 tablet at 06/16/25 2046    sodium chloride (PF) 0.9% PF flush 3 mL  3 mL Intracatheter Q8H TOSHIA Toya Powell PA-C   3 mL at 06/17/25 1451     PRN Meds:   Current Facility-Administered Medications   Medication Dose Route Frequency Provider Last Rate Last Admin    acetaminophen (TYLENOL) tablet 650 mg  650 mg Oral Q4H PRN Toya Powell PA-C   650 mg at 06/17/25 0953    Or    acetaminophen (TYLENOL) Suppository 650 mg  650 mg Rectal Q4H PRN Toya Powell PA-C        albuterol (PROVENTIL HFA/VENTOLIN HFA) inhaler  1-2 puff Inhalation Once PRN Nevin Zabala MD        albuterol (PROVENTIL) neb solution 2.5 mg  2.5 mg Nebulization Once PRN Nevin Zabala MD        albuterol (PROVENTIL) neb solution 2.5 mg  2.5 mg Nebulization Q6H PRN Toya Powell PA-C        alum & mag hydroxide-simethicone (MAALOX) suspension 30 mL  30 mL Oral Q4H PRN Toya Powell PA-C        bisacodyl (DULCOLAX) suppository 10 mg  10 mg Rectal Daily PRN Toya Powell PA-C        calcium carbonate (TUMS) chewable tablet 1,000 mg  1,000 mg Oral 4x Daily PRN Toya Powell PA-C        clonazePAM (klonoPIN) tablet 0.5 mg  0.5 mg Oral Daily PRN Toya Powell PA-C        cyclobenzaprine (FLEXERIL) tablet 5 mg  5 mg Oral TID PRN Toya Powell PA-C   5 mg at 06/16/25 0848    glucose gel 15-30 g  15-30 g Oral Q15 Min PRN Osito Pena PA-C        Or    dextrose 50 % injection 25-50 mL  25-50 mL Intravenous Q15 Min PRN Osito Pena PA-C        Or    glucagon injection 1 mg  1 mg Subcutaneous Q15 Min PRN Osito Pena PA-C        diphenhydrAMINE (BENADRYL) 25 mg in sodium chloride 0.9 % 55.5 mL intermittent infusion  25 mg Intravenous Q12H  PRN StNevin kimball  mL/hr at 06/15/25 1752 25 mg at 06/15/25 1752    diphenhydrAMINE (BENADRYL) injection 25 mg  25 mg Intravenous Once PRN Nevin Zabala MD        Or    diphenhydrAMINE (BENADRYL) injection 50 mg  50 mg Intravenous Once PRN Nevin Zabala MD        EPINEPHrine (ADRENALIN) kit 0.3 mg  0.3 mg Intramuscular Q5 Min PRN Nevin Zabala MD        famotidine (PEPCID) injection 20 mg  20 mg Intravenous Once PRN Nevin Zabala MD        HYDROmorphone (DILAUDID) tablet 2 mg  2 mg Oral Q3H PRN Deanna Severino MD        HYDROmorphone (PF) (DILAUDID) injection 0.3 mg  0.3 mg Intravenous Q4H PRN Deanna Severino MD   0.3 mg at 06/17/25 1451    Or    HYDROmorphone (PF) (DILAUDID) injection 0.5 mg  0.5 mg Intravenous Q4H PRN Deanna Severino MD        hydrOXYzine HCl (ATARAX) tablet 25 mg  25 mg Oral Q8H PRN Toya Powell PA-C   25 mg at 06/16/25 0034    lidocaine (LMX4) cream   Topical Once PRN Fran Pollock MD        lidocaine 1 % 0.1-1 mL  0.1-1 mL Other Q1H PRN Toya Powell PA-C        magnesium citrate solution 148 mL  148 mL Oral Once PRN Alma Vaughan DO        magnesium sulfate 2 g in 50 mL sterile water intermittent infusion  2 g Intravenous Q12H PRN Nevin Zabala MD 50 mL/hr at 06/16/25 1148 2 g at 06/16/25 1148    meperidine (DEMEROL) injection 25 mg  25 mg Intravenous Once PRN Nevin Zabala MD        methylPREDNISolone sodium succinate (SOLU-MEDROL) injection 40 mg  40 mg Intravenous Once PRN Nevin Zabala MD        naloxone (NARCAN) injection 0.2 mg  0.2 mg Intravenous Q2 Min PRN Alexandria Mcwilliams MD        Or    naloxone (NARCAN) injection 0.4 mg  0.4 mg Intravenous Q2 Min PRN Alexandria Mcwilliams MD        Or    naloxone (NARCAN) injection 0.2 mg  0.2 mg Intramuscular Q2 Min PRN Alexandria Mcwilliams MD        Or    naloxone (NARCAN) injection 0.4 mg  0.4 mg Intramuscular Q2 Min PRN  Alexandria Mcwilliams MD        ondansetron (ZOFRAN ODT) ODT tab 4 mg  4 mg Oral Q6H PRN Toya Powell PA-C   4 mg at 06/15/25 1752    ondansetron (ZOFRAN) injection 8 mg  8 mg Intravenous Q8H PRN Nevin Zabala MD   8 mg at 06/16/25 1147    Patient is already receiving anticoagulation with heparin, enoxaparin (LOVENOX), warfarin (COUMADIN)  or other anticoagulant medication   Does not apply Continuous PRN Toya Powell PA-C        senna-docusate (SENOKOT-S/PERICOLACE) 8.6-50 MG per tablet 1 tablet  1 tablet Oral BID PRN Toya Powell PA-C        Or    senna-docusate (SENOKOT-S/PERICOLACE) 8.6-50 MG per tablet 2 tablet  2 tablet Oral BID PRN Toya Powell PA-C        sodium chloride (PF) 0.9% PF flush 10-40 mL  10-40 mL Intracatheter Once PRN Maik Hess MD        sodium chloride (PF) 0.9% PF flush 3 mL  3 mL Intracatheter q1 min prn Toya Powell PA-C        sodium chloride 0.9% BOLUS 1,000 mL  1,000 mL Intravenous Once PRN Nevin Zabala MD                Data:      All new lab and imaging data was reviewed.     Negative oligoclonal bands         Assessment and Plan:          - Hx of CIDP,axonal variant ,  worsening leg strength, possible flare.     - LP reassuring with only slight protein elevation - plan to continue  IVIG for CIDP flare        - Small vessel disease on MRI brain : stable MRI brain 6/12/2025  reassuring with no acute CNS process.  Negative MRI cervical and thoracic spinal cords .  Negative CSF oligoclonal bands .       - Headaches: tension in description and could be cervicogenic, vs transformed migraine     RECOMMENDATIONS      - continue  IVIG 0.4g/kg x 5 days  - Day 1 - 6/14    ,    Failed PICC attempts , She had  a Port a catheter placement  6/16   - mild hypophosphatemia :  continue phosphorus supplements as per primary team       - Headaches - continue Lyrica, okay to continue prednisone 60mg x 5 days (  started 6/12),   Dr olivares increased amitriptyline to 75mg 6/14 at night and tolerating could increase to 100mg  Continue  PRN migraine cocktail  Zofran/Mag/Benadryl   Consider muscle relaxing med next     - Elevated ESR/CRP   improved from 20.8 to 4.4  - has had in past as well uncertain cause as no clear infection - pending labs JULIA screen borderline positive speckled 1:40. Negative ORVILLE panel      - continue PT/OT   -  consult: Patient then home health therapy    Dispo : ok for discharge after last dose of IVIG    Neuro will continue to follow   After last IVIG she will follow up with dr [facility] in [facility] clinic  of neurology noted plans for outpatient EMG     25 min spent on review of chart, exam, care coordination, and documentation on date of service     April Ro MD

## 2025-06-17 NOTE — PLAN OF CARE
Goal Outcome Evaluation:         [id number]  Reason for Admission: CIPD flare up    Cognitive/Mentation: A/Ox 4  Neuros/CMS: Intact ex BLE weakness  VS: VSS on RA.   Tele: n/a.  /GI: Continent. Last BM 6/16.   Pulmonary: LS clear.  Pain: 10/10 pain/tenderness at new chest port site, MD notified- dilaudid given x1    Drains/Lines: R chest port, PIV SL  Skin: dry feet- lotion in MAR  Activity: SBA with walker.  Diet: regular with thin liquids. Takes pills whole.     Therapies recs: pending  Discharge: pending IVIG completion    Aggression Stoplight Tool: green    End of shift summary: 3rd IVIG completed last night, R chest port accessed

## 2025-06-17 NOTE — PLAN OF CARE
Goal Outcome Evaluation:      Plan of Care Reviewed With: patient    Overall Patient Progress: no changeOverall Patient Progress: no change         Pt here with CIDP flareup. A&O x4. Neuros intact ex pt feels BLE weakness, LLE 4/5 but RLE 5/5. Baseline numbness top of L foot. VSS. Regular diet, thin liquids. Takes pills whole. Up with Ax1 GBW, set off bed alarm a couple times tonight. Reports pain at port site, dilaudid given x1 since 0300. Pt scoring green on the Aggression Stop Light Tool. Discharge plan pending.

## 2025-06-17 NOTE — PROGRESS NOTES
[facility]    Medicine Progress Note - Hospitalist Service    Date of Admission:  6/10/2025    Assessment & Plan     Nevin Alvarado is a 34 year old female with h/o CIDP who presented on 6/10/2025 with worsening leg pain, weakness, falls, back pain and headaches due to flare up of CIDP       Worsening neuropathy with falls in patient with chronic inflammatory demyelinating polyneuropathy - likely due to CIDP flare up  -CIDP diagnosed in 2015, characterized by the lower extremities weakness, L > R. Has been treated with weekly or biweekly IVIG [id number]. She discontinued IVIG due to lack of efficacy. Had worsening symptoms in 2024 due to flare up, treated with plasma exchange x 7 sessions, with some improvement in strength  -follows with Dr. Chance of [facility], refer to detailed note from 5/15/25. She has appointment for EMG on 6/30 and has been referred to medical genetics.   -she typically experiences lower extremity weakness, L > R and has foot drop, worse on L. Tremors in hands, tingling and numbness in arms and sensitivity to pressure causing numbness from arms to feet.   -recent outpatient work up 5/15 showed   -normal Vit D, B12, Methylmalonic acid 0.14. 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 Nonspecific changes in the brain parenchyma could be seen in the setting of migrainous symptoms.   -MRI lumbar spine showed - diffuse thickening of nerve roots with prominence to exiting nerve roots. Findings consistent with CIDP.   -MRI cervical and thoracic spine showed - No cord signal abnormality or abnormal cord enhancement     -CRP 7.6 (5/15) -> 20.8 (6/10) ->4.4  (6/13)  -ESR > 140 (6/10) -> 16 (6/13)  -LP 6/13 showed 0 nucleated cells, elevated protein 58.9  -ORVILLE Ab oanel negative. JULIA borderline positive     -General Neurology following. Started IVIG on 6/14. 5 treatments planned. Port-a-cath placed on 6/16  -PT OT    Persistent Headaches: tension vs cervicogenic, vs transformed migraine  -headaches have been worsening for a while. Did not improve with toradol  -On brain MRI, had changes suggestive of migraine  -has been receiving Iv magnesium and systemic steroids  -lyrica dose increased, amitriptyline dose increased   -prn zofran/benadryl available       Constipation, improved  Hx of thrombosed hemorrhoids: Hx of this, reports no BM X1 week, not passing flatus. Some abdominal pain in lower abdomen radiating to LUQ and epigastric region. Reports that she has trialed colace, miralax, suppositories, manual disimpaction outpatient with no relief. Not on narcotics. ? If related to mounjaro. Recently had a colonoscopy with [facility] which was reported as normal, but was referred to Colorectal Surgery for thrombosed hemorrhoids. No intervention performed as pt is on Eliquis. She has subsequently developed a few more hemorrhoids.   *6/10 KUB moderate stool burden  -Colorectal Surgery consulted for significant constipation, appreciate recommendations, signed off on 6/12  -Senokot BID, Miralax TID, PRN suppository and mag citrate available              -reduce as needed  -*good BM 6/11 midday and overnight, but still feels constipated on 6/12     Atypical chest pain- resolved  Localized to center of the chest, described as a burning. No radiation. Not aggravated with activity. No SOB. Vitals stable. No cardiac hx. ? GERD.   *EKG: sinus  - PRN maalox     Hx of PE (4/2024 and 2019)  -eliquis was held for LP and for port placement. Resumed on 6/16     Obesity: PTA Adriel has been on hold in the setting of recent outpatient procedures (colonoscopy, endoscopy, knee replacement), pt tried  "restarting, but became acutely ill with N/V, thus has remained off for now and will work with her outpatient weight management team to discuss resumption.      Fibromyalgia: Follows with [facility]. Has been put on Plaquenil 200 mg BID for unclear rheumatologic process. Defer management to them. Will continue for now.      ZOHRA: Does not tolerate CPAP      Generalized anxiety disorder   Borderline personality disorder   MDD   PTSD  Hx of self injurious behavior: Noted per Psychiatry note 4/2024. Reports stable mental health.   - Resume PTA meds including Buspar, Amitriptyline, PRN Klonopin and Atarax        Diet: Regular Diet Adult  Snacks/Supplements Adult: Ensure Max Protein (bariatric); Between Meals    DVT Prophylaxis: DOAC/lovenox   Hazel Catheter: Not present  Lines: PRESENT      Port a Cath 06/16/25 Single Lumen Right Chest wall-Site Assessment: WDL except;Tender    Cardiac Monitoring: None  Code Status: Full Code      Clinically Significant Risk Factors                              # Morbid Obesity: Estimated body mass index is 41.34 kg/m  as calculated from the following:    Height as of 6/1/25: 1.575 m (5' 2\").    Weight as of 6/9/25: 102.5 kg (226 lb).        # Financial/Environmental Concerns: none         Social Drivers of Health    Depression: At risk (3/12/2025)    Received from [employer]    PHQ-2     PHQ-2 TOTAL SCORE: 3   Financial Resource Strain: Medium Risk (3/2/2025)    Received from [facility]    Financial Resource Strain     Difficulty of Paying Living Expenses: 2     Difficulty of Paying Living Expenses: 1   Transportation Needs: Unmet Transportation Needs (3/2/2025)    [facility]    Transportation Needs     Does lack of transportation keep you from medical appointments?: 1     Does lack of transportation keep you from work, meetings or getting things that you need?: 2 "   Physical Activity: Insufficiently Active (11/13/2024)    Received from [facility]    Exercise Vital Sign     Days of Exercise per Week: 3 days     Minutes of Exercise per Session: 10 min          Disposition Plan     Medically Ready for Discharge: Anticipated in 2-4 Days             Deanna Severino MD  Hospitalist Service  [facility]  Securely message with [employer] (more info)  Text page via [employer] Paging/Directory   ______________________________________________________________________    Interval History     Complains of significant pain at site of port placement. Reports iv toradol did not help. Has received IV dilaudid for pain control          Physical Exam   Vital Signs: Temp: 98.1  F (36.7  C) Temp src: Oral BP: 136/79 Pulse: 99   Resp: 20 SpO2: 98 % O2 Device: None (Room air)    Weight: 0 lbs 0 oz    Constitutional - awake and alert, resting in bed, in no acute distress  Cardiovascular - regular rate and rhythm, no murmurs  Pulmonary - lungs are clear to auscultation bilaterally, no wheezing or rhonchi  Integumentary - skin is warm and dry, no rashes or ulcers  Neurological - awake, alert and oriented x3.  Moving all 4 extremities, normal speech, bilateral foot drop    Medical Decision Making       45 MINUTES SPENT BY ME on the date of service doing chart review, history, exam, documentation & further activities per the note.      Data         Imaging results reviewed over the past 24 hrs:   No results found for this or any previous visit (from the past 24 hours).

## 2025-06-17 NOTE — PROGRESS NOTES
[facility]    Medicine Progress Note - Hospitalist Service    Date of Admission:  6/10/2025    Assessment & Plan     Nevin Alvarado is a 34 year old female with h/o CIDP who presented on 6/10/2025 with worsening leg pain, weakness, falls, back pain and headaches due to flare up of CIDP       Worsening neuropathy with falls in patient with chronic inflammatory demyelinating polyneuropathy - likely due to CIDP flare up  -CIDP diagnosed in 2015, characterized by the lower extremities weakness, L > R. Has been treated with weekly or biweekly IVIG [id number]. She discontinued IVIG due to lack of efficacy. Had worsening symptoms in 2024 due to flare up, treated with plasma exchange x 7 sessions, with some improvement in strength  -follows with Dr. Chance of [facility], refer to detailed note from 5/15/25. She has appointment for EMG on 6/30 and has been referred to medical genetics.   -she typically experiences lower extremity weakness, L > R and has foot drop, worse on L. Tremors in hands, tingling and numbness in arms and sensitivity to pressure causing numbness from arms to feet.   -recent outpatient work up 5/15 showed   -normal Vit D, B12, Methylmalonic acid 0.14. CRP elevated at 7.6  -negative paraneoplastic/autoimmune panel negative  -negative SSA Ro, SSB La IgG, Glutamic acid decarboxylase Ab  -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 ([id number]), VARIANT OF UNCERTAIN SIGNIFICANCE (refer to result for complete details on recommendations)    -Brain MRI showed - No abnormal enhancement. Nonspecific changes in the brain parenchyma could be seen in the setting of migrainous symptoms.   -MRI lumbar spine showed - diffuse thickening of nerve roots with prominence to exiting nerve roots. Findings consistent with CIDP.   -MRI cervical and thoracic spine showed - No cord signal abnormality or abnormal cord enhancement     -CRP 7.6 (5/15) -> 20.8 (6/10) ->4.4  (6/13)  -ESR > 140 (6/10) -> 16 (6/13)  -LP 6/13 showed 0 nucleated cells, elevated protein 58.9  -JULIA/ORVILLE pending     -General Neurology following. Started IVIG on 6/14. 5 treatments planned. Port-a-cath placed on 6/16  -PT OT    Persistent Headaches: tension vs cervicogenic, vs transformed migraine  -headaches have been worsening for a while. Did not improve with toradol  -On brain MRI, had changes suggestive of migraine  -has been receiving Iv magnesium and systemic steroids  -lyrica dose increased, amitriptyline dose increased   -prn zofran/benadryl available       Constipation, improved  Hx of thrombosed hemorrhoids: Hx of this, reports no BM X1 week, not passing flatus. Some abdominal pain in lower abdomen radiating to LUQ and epigastric region. Reports that she has trialed colace, miralax, suppositories, manual disimpaction outpatient with no relief. Not on narcotics. ? If related to mounjaro. Recently had a colonoscopy with [facility] which was reported as normal, but was referred to Colorectal Surgery for thrombosed hemorrhoids. No intervention performed as pt is on Eliquis. She has subsequently developed a few more hemorrhoids.   *6/10 KUB moderate stool burden  -Colorectal Surgery consulted for significant constipation, appreciate recommendations, signed off on 6/12  -Senokot BID, Miralax TID, PRN suppository and mag citrate available              -reduce as needed  -*good BM 6/11 midday and overnight, but still feels constipated on 6/12     Atypical chest pain- resolved  Localized to center of the chest, described as a burning. No radiation. Not aggravated with activity. No SOB. Vitals stable. No cardiac hx. ? GERD.   *EKG: sinus  - PRN maalox     Hx of PE (4/2024 and 2019)  -eliquis was held for LP and for port placement. Resumed on 6/16     Obesity: PTA Adriel has been on hold in the setting of recent outpatient procedures (colonoscopy, endoscopy, knee replacement), pt tried restarting, but became acutely  "ill with N/V, thus has remained off for now and will work with her outpatient weight management team to discuss resumption.      Fibromyalgia: Follows with [facility]. Has been put on Plaquenil 200 mg BID for unclear rheumatologic process. Defer management to them. Will continue for now.      ZOHRA: Does not tolerate CPAP      Generalized anxiety disorder   Borderline personality disorder   MDD   PTSD  Hx of self injurious behavior: Noted per Psychiatry note 4/2024. Reports stable mental health.   - Resume PTA meds including Buspar, Amitriptyline, PRN Klonopin and Atarax        Diet: Regular Diet Adult    DVT Prophylaxis: DOAC/lovenox   Hazel Catheter: Not present  Lines: PRESENT      Port a Cath 06/16/25 Single Lumen Right Chest wall-Site Assessment: WDL    Cardiac Monitoring: None  Code Status: Full Code      Clinically Significant Risk Factors                              # Morbid Obesity: Estimated body mass index is 41.34 kg/m  as calculated from the following:    Height as of 6/1/25: 1.575 m (5' 2\").    Weight as of 6/9/25: 102.5 kg (226 lb).        # Financial/Environmental Concerns: none         Social Drivers of Health    Depression: At risk (3/12/2025)    Received from [employer]    PHQ-2     PHQ-2 TOTAL SCORE: 3   Financial Resource Strain: Medium Risk (3/2/2025)    [facility]    Financial Resource Strain     Difficulty of Paying Living Expenses: 2     Difficulty of Paying Living Expenses: 1   Transportation Needs: Unmet Transportation Needs (3/2/2025)    [facility]    Transportation Needs     Does lack of transportation keep you from medical appointments?: 1     Does lack of transportation keep you from work, meetings or getting things that you need?: 2   Physical Activity: Insufficiently Active (11/13/2024)    Received from [facility]    Exercise Vital Sign     Days of " Exercise per Week: 3 days     Minutes of Exercise per Session: 10 min          Disposition Plan     Medically Ready for Discharge: Anticipated in 2-4 Days             Deanna Severino MD  Hospitalist Service  [facility]  Securely message with [employer] (more info)  Text page via [employer] Paging/Directory   ______________________________________________________________________    Interval History     Feels ok. Complains of weakness and dizziness today  Has received 2 of 5 doses of IVIG  Port placement today          Physical Exam   Vital Signs: Temp: 98.8  F (37.1  C) Temp src: Oral BP: (!) 148/80 Pulse: 94   Resp: 16 SpO2: 96 % O2 Device: None (Room air)    Weight: 0 lbs 0 oz    Constitutional - awake and alert, resting in bed, in no acute distress  Cardiovascular - regular rate and rhythm, no murmurs  Pulmonary - lungs are clear to auscultation bilaterally, no wheezing or rhonchi  Integumentary - skin is warm and dry, no rashes or ulcers  Neurological - awake, alert and oriented x3.  Moving all 4 extremities, normal speech, bilateral foot drop    Medical Decision Making       45 MINUTES SPENT BY ME on the date of service doing chart review, history, exam, documentation & further activities per the note.      Data         Imaging results reviewed over the past 24 hrs:   Recent Results (from the past 24 hours)   IR Chest Port Placement > 5 Yrs of Age    Narrative    [facility]  LOCATION: [facility]  DATE: 6/16/2025    PROCEDURE: IMPLANTABLE VENOUS CHEST PORT PLACEMENT (POWER INJECTABLE)    INTERVENTIONAL RADIOLOGIST: Efrem Billy MD.    INDICATION: CIDP requiring long term IVIG. Port placement requested for access.    CONSENT: The risks, benefits and alternatives of implantable venous chest port placement were discussed with the patient in detail. All questions were answered. Informed consent was given to proceed with the procedure.    MODERATE SEDATION: Versed 4  mg IV; Fentanyl 200 mcg IV.  During the timeout, immediately prior to the administration of medications, the patient was reassessed for adequacy to receive conscious sedation. Under physician supervision, Versed and fentanyl   were administered for moderate sedation. Pulse oximetry, heart rate and blood pressure were continuously monitored by an independent trained observer. The physician spent 21 minutes of face-to-face sedation time with the patient.    CONTRAST: None.  ANTIBIOTICS: Ancef 2 g IV.  ADDITIONAL MEDICATIONS: None.    FLUOROSCOPIC TIME: 1.3 minutes.  RADIATION DOSE: Air Kerma: 11 mGy.    COMPLICATIONS: No immediate complications.    STERILE BARRIER TECHNIQUE: Maximum sterile barrier technique was used. Cutaneous antisepsis was performed at the operative site with application of 2% chlorhexidine and large sterile drape. Prior to the procedure, the  and assistant performed   hand hygiene and wore hat, mask, sterile gown, and sterile gloves during the entire procedure.    PROCEDURE:    Using real-time ultrasound guidance the right internal jugular vein was accessed.    A subcutaneous pocket was created and irrigated with sterile normal saline. The catheter tubing was tunneled in an antegrade fashion from the port pocket to the dermatotomy site. Over a guidewire, and under direct fluoroscopic visualization a peel-away   sheath was advanced over the wire. Through the peel-away sheath, the catheter tubing was advanced until the tip was at the cavoatrial junction. The catheter tubing was cut to length and attached firmly to the port. The port was placed within the   subcutaneous pocket and tested. The port pocket incision was closed with layered absorbable suture and surgical glue. The dermatotomy site was closed with surgical glue.     FINDINGS:  Ultrasound demonstrates an anechoic and compressible jugular vein. A permanent image was stored.    At the completion of the study the port tip lies near  the cavoatrial junction.      Impression    IMPRESSION:    1.  Successful implantable venous chest port placement as detailed above.  2.  The implantable venous chest port was placed under fluoroscopic guidance and is ready for use immediately.

## 2025-06-17 NOTE — PROVIDER NOTIFICATION
MD Notification    Notified Person: MD    Notified Person Name: Fran Pollock    Notification Date/Time: 0219, 6/17    Notification Interaction: Vocera    Purpose of Notification: pt is asking for something for 10/10 pain at her R chest port they placed today, says tylenol/cold packs are not effective. please advise    Orders Received: 0.3mg hydromorphone

## 2025-06-17 NOTE — PROGRESS NOTES
"BRIEF NUTRITION ASSESSMENT      REASON FOR ASSESSMENT:  Nevin Alvarado is a 34 year old female seen by Registered Dietitian for nutrition follow up.     CURRENT DIET AND INTAKE:  Diet: Regular  Snacks/Supplements: Ensure Max Protein at 10 AM Snack    CURRENT INTAKE/TOLERANCE  % of 2-3 meals daily per I/O flowsheets, per [employer] [facility] room service meal tray ticket review, and per pt and/or family reports  Patient is attempting to follow a high protein diet while admitted. Discussed high protein food items on menu.     ANTHROPOMETRICS:  Height: 1.575 m (5' 2\")  Admission Weight:     Most Recent Weight:    IBW: 50 kg  %IBW: AMMON> ordered new weight  There is no height or weight on file to calculate BMI. Unable to calculate d/t lack of anthropometrics   Weight History: Trending weight loss  Wt Readings from Last 10 Encounters:   06/09/25 102.5 kg (226 lb)   06/01/25 100.7 kg (222 lb)   05/28/25 100.7 kg (222 lb)   05/14/25 102.1 kg (225 lb)   05/07/25 103.6 kg (228 lb 6.3 oz)   04/14/25 102.5 kg (226 lb)   03/05/25 103.4 kg (228 lb)   02/27/25 103.4 kg (228 lb)   02/18/25 103.4 kg (228 lb)   01/22/25 104.3 kg (230 lb)       LABS:  Labs noted    MALNUTRITION:  Patient does not meet two of the following criteria necessary for diagnosing malnutrition.     % Weight Loss:  None noted  % Intake:  No decreased intake noted  Subcutaneous Fat Loss:  None observed  Muscle Loss:  None observed  Fluid Retention:  None noted    NUTRITION INTERVENTION:  Nutrition Diagnosis:  No nutrition diagnosis at this time.    Implementation:  - Discussed high protein food items on menu.   - Will start ordering Ensure Max protein at 10 am snack> patient may order more as desired.     FOLLOW UP/MONITORING:   Will re-evaluate in 7 - 10 days, or sooner, if re-consulted.      Juan Pena RD, LD, MS  [facility]  Available on [employer]     "

## 2025-06-17 NOTE — PROGRESS NOTES
Care Management Follow Up    Length of Stay (days): 7    Expected Discharge Date: 06/18/2025     Concerns to be Addressed: no discharge needs identified     Patient plan of care discussed at interdisciplinary rounds: Yes    Anticipated Discharge Disposition: Home, home care     Anticipated Discharge Services: None  Anticipated Discharge DME: None    Patient/family educated on Medicare website which has current facility and service quality ratings: no  Education Provided on the Discharge Plan:    Patient/Family in Agreement with the Plan: yes    Referrals Placed by CM/SW:  homecare  Private pay costs discussed: Not applicable    Discussed  Partnership in Safe Discharge Planning  document with patient/family: No     Handoff Completed: No, handoff not indicated or clinically appropriate    Additional Information:  Home care referral sent for RN, PT and OT.    Next Steps: Continue to follow for discharge planning.    Lola Elise RN, BSN, PHN  Inpatient Care Coordination  [facility]  Phone: [phone]

## 2025-06-17 NOTE — PROGRESS NOTES
Hospitalist service cross-cover note:     Ordered dose of hydromorphone IV for pain at port site recently placed. Functioning per RN. Oxycodone and hydrocodone allergies noted, patient reports has tolerated hydromorphone previously.      Fran Pollock MD   Hospitalist

## 2025-06-18 ENCOUNTER — APPOINTMENT (OUTPATIENT)
Dept: ULTRASOUND IMAGING | Facility: CLINIC | Age: 35
DRG: 074 | End: 2025-06-18
Attending: PSYCHIATRY & NEUROLOGY
Payer: COMMERCIAL

## 2025-06-18 ENCOUNTER — APPOINTMENT (OUTPATIENT)
Dept: PHYSICAL THERAPY | Facility: CLINIC | Age: 35
DRG: 074 | End: 2025-06-18
Payer: COMMERCIAL

## 2025-06-18 ENCOUNTER — APPOINTMENT (OUTPATIENT)
Dept: GENERAL RADIOLOGY | Facility: CLINIC | Age: 35
DRG: 074 | End: 2025-06-18
Attending: INTERNAL MEDICINE
Payer: COMMERCIAL

## 2025-06-18 PROBLEM — R00.0 TACHYCARDIA: Chronic | Status: ACTIVE | Noted: 2025-06-18

## 2025-06-18 PROBLEM — R00.0 TACHYCARDIA: Chronic | Status: RESOLVED | Noted: 2025-06-18 | Resolved: 2025-06-18

## 2025-06-18 LAB
ANION GAP SERPL CALCULATED.3IONS-SCNC: 7 MMOL/L (ref 7–15)
BACTERIA CSF CULT: NO GROWTH
BUN SERPL-MCNC: 18.6 MG/DL (ref 6–20)
CALCIUM SERPL-MCNC: 8.7 MG/DL (ref 8.8–10.4)
CHLORIDE SERPL-SCNC: 101 MMOL/L (ref 98–107)
CREAT SERPL-MCNC: 0.59 MG/DL (ref 0.51–0.95)
EGFRCR SERPLBLD CKD-EPI 2021: >90 ML/MIN/1.73M2
GLUCOSE SERPL-MCNC: 92 MG/DL (ref 70–99)
GRAM STAIN RESULT: NORMAL
GRAM STAIN RESULT: NORMAL
HCO3 SERPL-SCNC: 29 MMOL/L (ref 22–29)
PHOSPHATE SERPL-MCNC: 4.5 MG/DL (ref 2.5–4.5)
POTASSIUM SERPL-SCNC: 4.5 MMOL/L (ref 3.4–5.3)
SODIUM SERPL-SCNC: 137 MMOL/L (ref 135–145)

## 2025-06-18 PROCEDURE — 120N000001 HC R&B MED SURG/OB

## 2025-06-18 PROCEDURE — 80048 BASIC METABOLIC PNL TOTAL CA: CPT | Performed by: INTERNAL MEDICINE

## 2025-06-18 PROCEDURE — 97116 GAIT TRAINING THERAPY: CPT | Mod: GP

## 2025-06-18 PROCEDURE — 250N000011 HC RX IP 250 OP 636: Mod: JZ | Performed by: STUDENT IN AN ORGANIZED HEALTH CARE EDUCATION/TRAINING PROGRAM

## 2025-06-18 PROCEDURE — 250N000011 HC RX IP 250 OP 636

## 2025-06-18 PROCEDURE — 97530 THERAPEUTIC ACTIVITIES: CPT | Mod: GP

## 2025-06-18 PROCEDURE — 250N000013 HC RX MED GY IP 250 OP 250 PS 637: Performed by: PHYSICIAN ASSISTANT

## 2025-06-18 PROCEDURE — 99233 SBSQ HOSP IP/OBS HIGH 50: CPT | Performed by: INTERNAL MEDICINE

## 2025-06-18 PROCEDURE — 250N000013 HC RX MED GY IP 250 OP 250 PS 637: Performed by: INTERNAL MEDICINE

## 2025-06-18 PROCEDURE — 71046 X-RAY EXAM CHEST 2 VIEWS: CPT

## 2025-06-18 PROCEDURE — 999N000040 HC STATISTIC CONSULT NO CHARGE VASC ACCESS

## 2025-06-18 PROCEDURE — 250N000011 HC RX IP 250 OP 636: Performed by: INTERNAL MEDICINE

## 2025-06-18 PROCEDURE — 250N000011 HC RX IP 250 OP 636: Mod: JZ | Performed by: PSYCHIATRY & NEUROLOGY

## 2025-06-18 PROCEDURE — 93971 EXTREMITY STUDY: CPT | Mod: RT

## 2025-06-18 PROCEDURE — 84100 ASSAY OF PHOSPHORUS: CPT | Performed by: INTERNAL MEDICINE

## 2025-06-18 PROCEDURE — 250N000013 HC RX MED GY IP 250 OP 250 PS 637: Performed by: PSYCHIATRY & NEUROLOGY

## 2025-06-18 RX ORDER — METHYLPREDNISOLONE SODIUM SUCCINATE 125 MG/2ML
125 INJECTION INTRAMUSCULAR; INTRAVENOUS
Status: COMPLETED | OUTPATIENT
Start: 2025-06-18 | End: 2025-06-18

## 2025-06-18 RX ORDER — DIPHENHYDRAMINE HYDROCHLORIDE 50 MG/ML
50 INJECTION, SOLUTION INTRAMUSCULAR; INTRAVENOUS ONCE
Status: COMPLETED | OUTPATIENT
Start: 2025-06-18 | End: 2025-06-18

## 2025-06-18 RX ADMIN — CETIRIZINE HYDROCHLORIDE 10 MG: 10 TABLET, FILM COATED ORAL at 21:07

## 2025-06-18 RX ADMIN — HYDROXYZINE HYDROCHLORIDE 25 MG: 25 TABLET, FILM COATED ORAL at 00:45

## 2025-06-18 RX ADMIN — HYDROMORPHONE HYDROCHLORIDE 0.3 MG: 1 INJECTION, SOLUTION INTRAMUSCULAR; INTRAVENOUS; SUBCUTANEOUS at 04:03

## 2025-06-18 RX ADMIN — HYDROMORPHONE HYDROCHLORIDE 0.3 MG: 1 INJECTION, SOLUTION INTRAMUSCULAR; INTRAVENOUS; SUBCUTANEOUS at 13:42

## 2025-06-18 RX ADMIN — AMITRIPTYLINE HYDROCHLORIDE 75 MG: 25 TABLET, FILM COATED ORAL at 21:06

## 2025-06-18 RX ADMIN — METHYLPREDNISOLONE SODIUM SUCCINATE 125 MG: 125 INJECTION, POWDER, FOR SOLUTION INTRAMUSCULAR; INTRAVENOUS at 21:06

## 2025-06-18 RX ADMIN — BUSPIRONE HYDROCHLORIDE 15 MG: 15 TABLET ORAL at 21:07

## 2025-06-18 RX ADMIN — APIXABAN 5 MG: 5 TABLET, FILM COATED ORAL at 21:07

## 2025-06-18 RX ADMIN — HUMAN IMMUNOGLOBULIN G 20 G: 20 LIQUID INTRAVENOUS at 22:33

## 2025-06-18 RX ADMIN — DIBASIC SODIUM PHOSPHATE, MONOBASIC POTASSIUM PHOSPHATE AND MONOBASIC SODIUM PHOSPHATE 250 MG: 852; 155; 130 TABLET ORAL at 08:04

## 2025-06-18 RX ADMIN — APIXABAN 5 MG: 5 TABLET, FILM COATED ORAL at 07:59

## 2025-06-18 RX ADMIN — ACETAMINOPHEN 650 MG: 325 TABLET, FILM COATED ORAL at 21:06

## 2025-06-18 RX ADMIN — HYDROMORPHONE HYDROCHLORIDE 0.5 MG: 1 INJECTION, SOLUTION INTRAMUSCULAR; INTRAVENOUS; SUBCUTANEOUS at 22:10

## 2025-06-18 RX ADMIN — PREGABALIN 200 MG: 100 CAPSULE ORAL at 21:07

## 2025-06-18 RX ADMIN — CETIRIZINE HYDROCHLORIDE 10 MG: 10 TABLET, FILM COATED ORAL at 08:01

## 2025-06-18 RX ADMIN — Medication 5 ML: at 18:10

## 2025-06-18 RX ADMIN — Medication 5 ML: at 23:38

## 2025-06-18 RX ADMIN — Medication 5 ML: at 13:51

## 2025-06-18 RX ADMIN — NORETHINDRONE ACETATE 5 MG: 5 TABLET ORAL at 08:03

## 2025-06-18 RX ADMIN — BUSPIRONE HYDROCHLORIDE 15 MG: 15 TABLET ORAL at 08:01

## 2025-06-18 RX ADMIN — HYDROXYCHLOROQUINE SULFATE 200 MG: 200 TABLET, FILM COATED ORAL at 21:07

## 2025-06-18 RX ADMIN — HYDROXYCHLOROQUINE SULFATE 200 MG: 200 TABLET, FILM COATED ORAL at 08:03

## 2025-06-18 RX ADMIN — DIPHENHYDRAMINE HYDROCHLORIDE 50 MG: 50 INJECTION, SOLUTION INTRAMUSCULAR; INTRAVENOUS at 21:52

## 2025-06-18 RX ADMIN — PANTOPRAZOLE SODIUM 40 MG: 40 TABLET, DELAYED RELEASE ORAL at 08:03

## 2025-06-18 RX ADMIN — HYDROMORPHONE HYDROCHLORIDE 0.3 MG: 1 INJECTION, SOLUTION INTRAMUSCULAR; INTRAVENOUS; SUBCUTANEOUS at 18:08

## 2025-06-18 RX ADMIN — PREGABALIN 100 MG: 100 CAPSULE ORAL at 12:53

## 2025-06-18 RX ADMIN — HYDROMORPHONE HYDROCHLORIDE 0.3 MG: 1 INJECTION, SOLUTION INTRAMUSCULAR; INTRAVENOUS; SUBCUTANEOUS at 07:55

## 2025-06-18 RX ADMIN — ACETAMINOPHEN 650 MG: 325 TABLET, FILM COATED ORAL at 10:07

## 2025-06-18 RX ADMIN — Medication: at 08:05

## 2025-06-18 RX ADMIN — ACETAMINOPHEN 650 MG: 325 TABLET, FILM COATED ORAL at 16:20

## 2025-06-18 RX ADMIN — PREGABALIN 100 MG: 100 CAPSULE ORAL at 08:02

## 2025-06-18 ASSESSMENT — ACTIVITIES OF DAILY LIVING (ADL)
ADLS_ACUITY_SCORE: 65

## 2025-06-18 NOTE — PROGRESS NOTES
[facility]    Medicine Progress Note - Hospitalist Service    Date of Admission:  6/10/2025    Assessment & Plan     Nevin Alvarado is a 34 year old female with h/o CIDP who presented on 6/10/2025 with worsening leg pain, weakness, falls, back pain and headaches due to flare up of CIDP       Worsening neuropathy with falls in patient with chronic inflammatory demyelinating polyneuropathy - likely due to CIDP flare up  -CIDP diagnosed in 2015, characterized by the lower extremities weakness, L > R. Has been treated with weekly or biweekly IVIG [id number]. She discontinued IVIG due to lack of efficacy. Had worsening symptoms in 2024 due to flare up, treated with plasma exchange x 7 sessions, with some improvement in strength  -follows with Dr. Chance of [facility], refer to detailed note from 5/15/25. She has appointment for EMG on 6/30 and has been referred to medical genetics.   -she typically experiences lower extremity weakness, L > R and has foot drop, worse on L. Tremors in hands, tingling and numbness in arms and sensitivity to pressure causing numbness from arms to feet.   -recent outpatient work up 5/15 showed   -normal Vit D, B12, Methylmalonic acid 0.14. CRP elevated at 7.6  -negative paraneoplastic/autoimmune panel negative  -negative SSA Ro, SSB La IgG, Glutamic acid decarboxylase Ab  -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 ([id number]), VARIANT OF UNCERTAIN SIGNIFICANCE (refer to result for complete details on recommendations)    -Brain MRI showed - No abnormal enhancement. Nonspecific changes in the brain parenchyma could be seen in the setting of migrainous symptoms.   -MRI lumbar spine showed - diffuse thickening of nerve roots with prominence to exiting nerve roots. Findings consistent with CIDP.   -MRI cervical and thoracic spine showed - No cord signal abnormality or abnormal cord enhancement     -CRP 7.6 (5/15) -> 20.8 (6/10) ->4.4  (6/13)  -ESR > 140 (6/10) -> 16 (6/13)  -LP 6/13 showed 0 nucleated cells, elevated protein 58.9  -ORVILLE Ab panel negative. JULIA borderline positive     -General Neurology following. Started IVIG on 6/14. 5 treatments planned. Port-a-cath placed on 6/16  -will receive last dose of IVIG on 6/18 evening   -PT OT - recommended home with home care  -will follow up with primary neurologist, Dr. Chance as outpatient. Is expected to remain on IVIG q3-4 weeks     Persistent Headaches: tension vs cervicogenic, vs transformed migraine  -headaches have been worsening for a while. Did not improve with toradol  -On brain MRI, had changes suggestive of migraine  -completed 5 days of prednisone 60 mg daily, 6/12-6/16  -has prn IV magnesium, zofran/benadryl available  -lyrica increased to 100 mg 100 mg in am and noon and 200 mg hs, amitriptyline increased to 75 mg   -headaches have now improved     Persistent pain at site of port-a-cath  -since port was placed on 6/16, she has had severe pain requiring IV dilaudid. IR recommended CXR. This showed tip in SVC. No complications. US negative for DVT in RUE  -continue pain control     Orthostatic dizziness  -has been complaining of dizziness since admission. Orthostatic vitals showed no significant drop in BP but did have tachycardia with HR in 130s on standing up and felt dizzy. Po intake has been adequate  -continue to monitor     Hypophosphatemia  -phos was low on multiple occasions. Now replaced  -continue po phos daily     Constipation, improved  Hx of thrombosed hemorrhoids: Hx of this, reports no BM X1 week, not passing flatus. Some abdominal pain in lower abdomen radiating to LUQ and epigastric region. Reports that she has trialed colace, miralax, suppositories, manual disimpaction outpatient with no relief. Not on narcotics. ? If related to mounjaro. Recently had a colonoscopy with [facility] which was reported as normal, but was referred to Colorectal Surgery for thrombosed hemorrhoids.  "No intervention performed as pt is on Eliquis. She has subsequently developed a few more hemorrhoids.   *6/10 KUB moderate stool burden  -Colorectal Surgery consulted for significant constipation, appreciate recommendations, signed off on 6/12  -Senokot BID, Miralax TID, PRN suppository and mag citrate available              -reduce as needed  -*good BM 6/11 midday and overnight, but still feels constipated on 6/12     Atypical chest pain- resolved  Localized to center of the chest, described as a burning. No radiation. Not aggravated with activity. No SOB. Vitals stable. No cardiac hx. ? GERD.   *EKG: sinus  - PRN maalox     Hx of PE (4/2024 and 2019)  -eliquis was held for LP and for port placement. Resumed on 6/16     Obesity: PTA Adriel has been on hold in the setting of recent outpatient procedures (colonoscopy, endoscopy, knee replacement), pt tried restarting, but became acutely ill with N/V, thus has remained off for now and will work with her outpatient weight management team to discuss resumption.      Fibromyalgia: Follows with [facility]. Has been put on Plaquenil 200 mg BID for unclear rheumatologic process. Defer management to them. Will continue for now.      ZOHRA: Does not tolerate CPAP      Generalized anxiety disorder   Borderline personality disorder   MDD   PTSD  Hx of self injurious behavior: Noted per Psychiatry note 4/2024. Reports stable mental health.   - Resume PTA meds including Buspar, Amitriptyline, PRN Klonopin and Atarax        Diet: Regular Diet Adult  Snacks/Supplements Adult: Ensure Max Protein (bariatric); Between Meals    DVT Prophylaxis: DOAC   Hazel Catheter: Not present  Lines: PRESENT           Cardiac Monitoring: None  Code Status: Full Code      Clinically Significant Risk Factors                              # Morbid Obesity: Estimated body mass index is 41.34 kg/m  as calculated from the following:    Height as of 6/1/25: 1.575 m (5' 2\").    Weight as of 6/9/25: " 102.5 kg (226 lb).        # Financial/Environmental Concerns: none         Social Drivers of Health    Depression: At risk (3/12/2025)    Received from [facility]    PHQ-2     PHQ-2 TOTAL SCORE: 3   Financial Resource Strain: Medium Risk (3/2/2025)    Received from [facility] [employer] [facility] [employer] [facility]    Financial Resource Strain     Difficulty of Paying Living Expenses: 2     Difficulty of Paying Living Expenses: 1   Transportation Needs: Unmet Transportation Needs (3/2/2025)    Received from [facility] [employer] [facility]    Transportation Needs     Does lack of transportation keep you from medical appointments?: 1     Does lack of transportation keep you from work, meetings or getting things that you need?: 2   Physical Activity: Insufficiently Active (11/13/2024)    Received from [facility]    Exercise Vital Sign     Days of Exercise per Week: 3 days     Minutes of Exercise per Session: 10 min          Disposition Plan     Medically Ready for Discharge: Anticipated Tomorrow             Deanna Severino MD  Hospitalist Service  [facility]  Securely message with [employer] (more info)  Text page via [employer] Paging/Directory   ______________________________________________________________________    Interval History     Complains of significant pain at site of port placement.   Has received IV dilaudid for pain control  Has been feeling dizzy when standing up         Physical Exam   Vital Signs: Temp: 98.9  F (37.2  C) Temp src: Oral BP: (!) 131/90 Pulse: 101   Resp: 18 SpO2: 95 % O2 Device: None (Room air)    Weight: 0 lbs 0 oz    Constitutional - awake and alert, resting in bed, in no acute distress  Integumentary - skin is warm and dry, no rashes or ulcers  Neurological - awake, alert and oriented x3.  Moving all 4 extremities, normal speech, bilateral foot drop    Medical Decision Making       45 MINUTES SPENT BY ME on the date of  service doing chart review, history, exam, documentation & further activities per the note.      Data     I have personally reviewed the following data over the past 24 hrs:    N/A  \   N/A   / N/A     137 101 18.6 /  92   4.5 29 0.59 \       Imaging results reviewed over the past 24 hrs:   Recent Results (from the past 24 hours)   US Upper Extremity Venous Duplex Right    Narrative    EXAM: US UPPER EXTREMITY VENOUS DUPLEX RIGHT  LOCATION: [facility]  DATE: 6/18/2025    INDICATION: arm pain after port and IVIG r o DVT  COMPARISON: None.  TECHNIQUE: Venous Duplex ultrasound of the right upper extremity with (when possible) and without compression, augmentation, and duplex. Color flow and spectral Doppler with waveform analysis performed.    FINDINGS: Ultrasound includes evaluation of the internal jugular vein, subclavian vein, axillary vein, and brachial vein. The superficial cephalic and basilic veins were also evaluated where seen.     RIGHT: No deep venous thrombosis. No superficial thrombophlebitis.      Impression    IMPRESSION:  1.  No deep venous thrombosis in the right upper extremity.   XR Chest 2 Views    Narrative    EXAM: XR CHEST 2 VIEWS  LOCATION: [facility]  DATE: 6/18/2025    INDICATION: significant pain s p port placement.  COMPARISON: Chest x-rays, most recently 5/12/2025. Fluoroscopic port placement dated 6/16/2025.      Impression    IMPRESSION: Right IJ Port-A-Cath tip projects over the low SVC. Otherwise negative chest.

## 2025-06-18 NOTE — PROGRESS NOTES
Neurology Progress Note   2025      Nevin Alvarado  :  1990            Interval History:        Over the last 24 hours she continue to has orthostatic dizziness .  We did orthostatic vital signs , BP did not drop , but HR increased for 90s to 120s .  Hospitalist informed about tachycardia .  She tried propranolol before (forgot the dose) did not help .  Educated about drinking enough water and +/- electrolytes .  C/o mild worsening of numbness , I assured her that her neuro exam today is stable .    C/o right arm pains after IVIG last night ? Possibly transferred from port .  H/o DVT I ordered US right arm     Q. What is my plan on discharge ?  I talked to our [place] office [facility] clinic of Neurology and [facility] of Dr. Chance will call her to schedule her schedule .  Dr Chance likely will continue IVIG every 3 weeks or monthly depend on how is she doing .                          Physical Exam:       , Blood pressure (!) 131/90, pulse 97, temperature 98  F (36.7  C), temperature source Axillary, resp. rate 18, SpO2 95%, not currently breastfeeding.  There were no vitals filed for this visit.  Vital Signs with Ranges  Temp:  [98  F (36.7  C)-98.7  F (37.1  C)] 98  F (36.7  C)  Pulse:  [92-99] 97  Resp:  [18-20] 18  BP: (127-147)/(75-90) 131/90  SpO2:  [95 %-98 %] 95 %    Neurological Exam:  General: Mental status -Alert and orientated, speech without dysarthria, language fluent with intact naming and repetition  Cranial Nerves-  Pupils equal round and reactive to light. Extraocular movements intact. No nystagmus.  Face symmetric and intact to light touch, tongue midline, palate activates symmetrically. Shoulder shrug 5/5  Motor: Normal muscle bulk and tone.  Has 5/5 strength in bilateral upper. Lowers - hip flexion 5 on right, 4 on left , knee ext 5- on right, 4+ on left,    Ankle dorsi flexion ADF 5 on rght, < 3 on left,  plantarflexion 5-/5  Sensation:   BUE intact to LT and PP   RLE :   decrease light touch   and PP on right foot  LLE: Subjectively feels worse on left foot with decrease LT, PP up to mid shin (similar to her baseline exam )  absent vibration in left great toe and at knee   Reflexes:  patella 0.5 + , ankles 0 , toes downgoing  Cerebellar: intact FNF  Gait: could stand with walker  Sitting comfortable on chair         Medications:        Current Facility-Administered Medications   Medication Dose Route Frequency Provider Last Rate Last Admin    amitriptyline (ELAVIL) tablet 75 mg  75 mg Oral At Bedtime Nevin Zabala MD   75 mg at 06/17/25 2215    ammonium lactate (LAC-HYDRIN) 12 % lotion   Topical BID Alma Vaughan DO   Given at 06/18/25 0805    apixaban ANTICOAGULANT (ELIQUIS) tablet 5 mg  5 mg Oral BID Deanna Severino MD   5 mg at 06/18/25 0759    busPIRone (BUSPAR) tablet 15 mg  15 mg Oral BID Toya Powell PA-C   15 mg at 06/18/25 0801    cetirizine (zyrTEC) tablet 10 mg  10 mg Oral BID Toya Powell PA-C   10 mg at 06/18/25 0801    heparin lock flush 10 unit/mL injection 5-10 mL  5-10 mL Intracatheter Q24H Deanna Severino MD   5 mL at 06/17/25 2311    heparin lock flush 100 unit/mL injection 5-10 mL  5-10 mL Intracatheter Q28 Days Deanna Severino MD        hydroxychloroquine (PLAQUENIL) tablet 200 mg  200 mg Oral BID Toya Powell PA-C   200 mg at 06/18/25 0803    immune globulin - sucrose free 10% (PRIVIGEN) infusion 20 g  0.4 g/kg (Ideal) Intravenous Q24H Alexandria Mcwilliams  mL/hr at 06/15/25 2220 20 g at 06/17/25 2209    lidocaine (PF) (XYLOCAINE) 1 % injection 30 mL  30 mL Intradermal Once Nevin Gamble MD        norethindrone (AYGESTIN) tablet 5 mg  5 mg Oral Daily Toya Powell PA-C   5 mg at 06/18/25 0803    pantoprazole (PROTONIX) EC tablet 40 mg  40 mg Oral Daily Toya Powell PA-C   40 mg at 06/18/25 0803    [START ON 6/19/2025] phosphorus tablet 250 mg (PHOSPHA 250 NEUTRAL) per  tablet 250 mg  250 mg Oral Daily Deanna Severino MD        polyethylene glycol (MIRALAX) Packet 17 g  17 g Oral TID Keyonna Caban PA-C   17 g at 06/17/25 0902    pregabalin (LYRICA) capsule 100 mg  100 mg Oral QAM Toya Powell PA-C   100 mg at 06/18/25 0802    pregabalin (LYRICA) capsule 100 mg  100 mg Oral Daily with lunch Toya Powell PA-C   100 mg at 06/17/25 1221    pregabalin (LYRICA) capsule 200 mg  200 mg Oral QPM Toya Powell PA-C   200 mg at 06/17/25 2047    senna-docusate (SENOKOT-S/PERICOLACE) 8.6-50 MG per tablet 1 tablet  1 tablet Oral BID Toya Powell PA-C   1 tablet at 06/17/25 0903    Or    senna-docusate (SENOKOT-S/PERICOLACE) 8.6-50 MG per tablet 2 tablet  2 tablet Oral BID Toya Powell PA-C   2 tablet at 06/16/25 2046    sodium chloride (PF) 0.9% PF flush 10-20 mL  10-20 mL Intracatheter Q28 Days Deanna Severino MD        sodium chloride (PF) 0.9% PF flush 3 mL  3 mL Intracatheter Q8H TOSHIA Toya Powell PA-C   3 mL at 06/17/25 2311     PRN Meds:   Current Facility-Administered Medications   Medication Dose Route Frequency Provider Last Rate Last Admin    acetaminophen (TYLENOL) tablet 650 mg  650 mg Oral Q4H PRN Toya Powell PA-C   650 mg at 06/18/25 1007    Or    acetaminophen (TYLENOL) Suppository 650 mg  650 mg Rectal Q4H PRN Toya Powell PA-C        albuterol (PROVENTIL HFA/VENTOLIN HFA) inhaler  1-2 puff Inhalation Once PRN Nevin Zabala MD        albuterol (PROVENTIL) neb solution 2.5 mg  2.5 mg Nebulization Once PRN Nevin Zabala MD        albuterol (PROVENTIL) neb solution 2.5 mg  2.5 mg Nebulization Q6H PRN Toya Powell PA-C        alum & mag hydroxide-simethicone (MAALOX) suspension 30 mL  30 mL Oral Q4H PRN Toya Powell PA-C        bisacodyl (DULCOLAX) suppository 10 mg  10 mg Rectal Daily PRN Toya Powell PA-C        calcium  carbonate (TUMS) chewable tablet 1,000 mg  1,000 mg Oral 4x Daily PRN Toya Powell PA-C        clonazePAM (klonoPIN) tablet 0.5 mg  0.5 mg Oral Daily PRN oTya Powell PA-C        cyclobenzaprine (FLEXERIL) tablet 5 mg  5 mg Oral TID PRN Toya Powell PA-C   5 mg at 06/16/25 0848    glucose gel 15-30 g  15-30 g Oral Q15 Min PRN Osito Pena PA-C        Or    dextrose 50 % injection 25-50 mL  25-50 mL Intravenous Q15 Min PRN Osito Pena PA-C        Or    glucagon injection 1 mg  1 mg Subcutaneous Q15 Min PRN Osito Pena PA-C        diphenhydrAMINE (BENADRYL) 25 mg in sodium chloride 0.9 % 55.5 mL intermittent infusion  25 mg Intravenous Q12H PRN Nevin Zabala  mL/hr at 06/15/25 1752 25 mg at 06/15/25 1752    EPINEPHrine (ADRENALIN) kit 0.3 mg  0.3 mg Intramuscular Q5 Min PRN Nevin Zabala MD        famotidine (PEPCID) injection 20 mg  20 mg Intravenous Once PRN Nevin Zabala MD        heparin lock flush 10 unit/mL injection 5-10 mL  5-10 mL Intracatheter Q1H PRN Deanna Severino MD        HYDROmorphone (DILAUDID) tablet 2 mg  2 mg Oral Q3H PRN Deanna Severino MD        HYDROmorphone (PF) (DILAUDID) injection 0.3 mg  0.3 mg Intravenous Q4H PRN Deanna Severino MD   0.3 mg at 06/18/25 0755    Or    HYDROmorphone (PF) (DILAUDID) injection 0.5 mg  0.5 mg Intravenous Q4H PRN Deanna Severino MD        hydrOXYzine HCl (ATARAX) tablet 25 mg  25 mg Oral Q8H PRN Toya Powell PA-C   25 mg at 06/18/25 0045    lidocaine (LMX4) cream   Topical Once PRN Fran Pollock MD        lidocaine 1 % 0.1-1 mL  0.1-1 mL Other Q1H PRN Toya Powell PA-C        magnesium citrate solution 148 mL  148 mL Oral Once PRN Alma Vaughan DO        magnesium sulfate 2 g in 50 mL sterile water intermittent infusion  2 g Intravenous Q12H PRN Nevin Zabala MD 50 mL/hr at 06/16/25 1148 2 g at 06/16/25 1148     meperidine (DEMEROL) injection 25 mg  25 mg Intravenous Once PRN Nevin Zabala MD        methylPREDNISolone sodium succinate (SOLU-MEDROL) injection 40 mg  40 mg Intravenous Once PRN Nevin Zabala MD        naloxone (NARCAN) injection 0.2 mg  0.2 mg Intravenous Q2 Min PRN Alexandria Mcwilliams MD        Or    naloxone (NARCAN) injection 0.4 mg  0.4 mg Intravenous Q2 Min PRN Alexandria Mcwilliams MD        Or    naloxone (NARCAN) injection 0.2 mg  0.2 mg Intramuscular Q2 Min PRN Alexandria Mcwilliams MD        Or    naloxone (NARCAN) injection 0.4 mg  0.4 mg Intramuscular Q2 Min PRN Alexandria Mcwilliams MD        ondansetron (ZOFRAN ODT) ODT tab 4 mg  4 mg Oral Q6H PRN Toya Powell PA-C   4 mg at 06/15/25 1752    ondansetron (ZOFRAN) injection 8 mg  8 mg Intravenous Q8H PRN Nevin Zabala MD   8 mg at 06/16/25 1147    Patient is already receiving anticoagulation with heparin, enoxaparin (LOVENOX), warfarin (COUMADIN)  or other anticoagulant medication   Does not apply Continuous PRN Toya Powell PA-C        senna-docusate (SENOKOT-S/PERICOLACE) 8.6-50 MG per tablet 1 tablet  1 tablet Oral BID PRN Toya Powell PA-C        Or    senna-docusate (SENOKOT-S/PERICOLACE) 8.6-50 MG per tablet 2 tablet  2 tablet Oral BID PRN Toya Powell PA-C        sodium chloride (PF) 0.9% PF flush 10-20 mL  10-20 mL Intracatheter q1 min prn Deanna Severino MD        sodium chloride (PF) 0.9% PF flush 10-20 mL  10-20 mL Intracatheter Q1H PRN Deanna Severino MD        sodium chloride (PF) 0.9% PF flush 10-40 mL  10-40 mL Intracatheter Once PRN Maik Hess MD        sodium chloride (PF) 0.9% PF flush 3 mL  3 mL Intracatheter q1 min prn Toya Powell PA-C   3 mL at 06/18/25 0754    sodium chloride 0.9% BOLUS 1,000 mL  1,000 mL Intravenous Once PRN Nevin Zabala MD                Data:      All new lab and imaging data was reviewed.      Negative oligoclonal bands         Assessment and Plan:          - Hx of CIDP,axonal variant ,  worsening leg strength, possible flare.     - LP reassuring with only slight protein elevation - plan to continue  IVIG for CIDP flare        - Small vessel disease on MRI brain : stable MRI brain 6/12/2025  reassuring with no acute CNS process.  Negative MRI cervical and thoracic spinal cords .  Negative CSF oligoclonal bands .       - Headaches: tension in description and could be cervicogenic, vs transformed migraine     RECOMMENDATIONS      - continue  IVIG 0.4g/kg x 5 days  - Day 1 - 6/14    ,tonight will be last dose 5/5    Failed PICC attempts , She had  a Port a catheter placement  6/16   Mild subjective tongue swelling during IVIG: I ordered solumedrol 125 mg 30 minutes before IVIG    - mild hypophosphatemia :  continue phosphorus supplements as per primary team     - Headaches - improved .   continue Lyrica 100 mg  twice daily and 200 m  nightly, patient like to continue the same dose on discharge  okay to continue prednisone 60mg x 5 days ( started 6/12),   Dr olivares increased amitriptyline to 75mg 6/14 at night helps , patient like to continue same dose on discharge  Continue  PRN migraine cocktail  Zofran/Mag/Benadryl   Consider muscle relaxing med next     - Elevated ESR/CRP   improved from 20.8 to 4.4  - has had in past as well uncertain cause as no clear infection - pending labs JULIA screen borderline positive speckled 1:40. Negative ORVILLE panel   , no further stevenson needed      - continue PT/OT   - talked to  and plans in place for discharge tomorrow    Dispo : ok for discharge in am thursday     Talked to her nurse   Neuro will continue to follow while inpatient  she will follow up with dr Chance in [facility] clinic  of neurology noted plans for outpatient EMG     35 min spent on review of chart, exam, care coordination, and documentation on date of service     April Ro MD

## 2025-06-18 NOTE — PLAN OF CARE
Reason for Admission: CIDP flareup     Cognitive/Mentation: A/Ox 4, anxious  Neuros/CMS: Intact ex weak bilateral dorsiflexion, L foot baseline numbness to top of foot  VS: VSS on RA.   Tele: n/a.  /GI: Continent. Last BM 6/17/25, loose.   Pulmonary: LS clear.  Pain: Complains of pain around chest port, ice pack and dilaudid x2     Drains/Lines: PIV SL and chest port SL  Skin: scattered bruising, cracked skin to feet  Activity: Assist x 1 with walker gb.  Diet: Regular with thin liquids. Takes pills whole with water.      Therapies recs: home with homecare  Discharge: pending     Aggression Stoplight Tool: green     End of shift summary:  IVIG 4 of 5 completed. Slept in between cares. Refused to have monika wipes done this shift-wants it later during the day.

## 2025-06-18 NOTE — PROGRESS NOTES
"[facility]UAL [facility] - Progress Note  [facility] Neuro    Referral Source: Follow up visit    - Nevin relayed to me some anxiety and also cautious optimism: \"I hope that my new treatment will work out.\"   - Although her last scheduled procedure is tomorrow, she expressed \"fear\" that this stay in the hospital \"will be like the the last one\"--- Nevin told me about a similar experience from last year sharing that she \"left the hospital too early.\" This resulted in her readmission only a few days later.  - Nevin gave voice to her worry that she \"will deal with this illness for the rest of her life.\"   - When I asked her if her recent stay changed the way she saw God, she said \"no.\"  - She pointed to reading, drawing, and talking to friends as ways to \"stay busy.\" She named that these were ways of distraction, but also real ways of processing grief.     Plan: Pt is aware of [facility] and will reach out with any future needs.       Jim Enriquez    Intern    [facility] available 24/7 for emergent requests/referrals, either by paging the on-call  or by entering an ASAP/STAT consult in Epic (this will also page the on-call ).   "

## 2025-06-18 NOTE — PROGRESS NOTES
"Care Management Follow Up    Length of Stay (days): 8    Expected Discharge Date: 06/19/2025     Concerns to be Addressed: no discharge needs identified     Patient plan of care discussed at interdisciplinary rounds: Yes    Anticipated Discharge Disposition: Home              Anticipated Discharge Services: None  Anticipated Discharge DME: None    Patient/family educated on Medicare website which has current facility and service quality ratings: no  Education Provided on the Discharge Plan:    Patient/Family in Agreement with the Plan: yes    Referrals Placed by CM/SW:    Private pay costs discussed: Not applicable    Discussed  Partnership in Safe Discharge Planning  document with patient/family: No     Handoff Completed: No, handoff not indicated or clinically appropriate    Additional Information:  Met with patient.  She was very emotional and stated she doesn't feel well today.  Let her know home care agency has been secured for her.  She expressed concern that the last time she had home care the agency was not helpful.  She will still accept services at this time.  Explained that agency selected is usually determined by in network status with her insurance and availability.  Actively listened to patient's concerns.  She expressed much frustration, stating \"I don't know what's going on.\"  Nurse came in and assured her that concerns have been addressed with Neurologist and that there is a plan for her next infusion (she had c/o of tongue soreness with last IVIG infusion).    Rounding Neurologist is coordinating with clinic for her follow up with Dr. Chance.    Next Steps: Continue to follow for discharge planning.    Lola Elise RN, BSN, PHN  Inpatient Care Coordination  [facility]  Phone: [phone]        "

## 2025-06-18 NOTE — PROGRESS NOTES
"MD Notification    Notified Person: MD    Notified Person Name: CARMELO [facility]    Notification Date/Time:June 18, 2025 at 1148    Notification Interaction: phone call    Purpose of Notification: Pt has been having pain at chest port site. Describes it as \"sharp\" and a \"pulling\" sensation at site. She also felt aching in R arm towards the end of IVIG infusion yesterday    Orders Received: No change to plan of care. If hospitalist is concerned they can get 2 view chest x-ray otherwise continue with pain management.    Comments:    "

## 2025-06-19 VITALS
TEMPERATURE: 99.3 F | OXYGEN SATURATION: 96 % | SYSTOLIC BLOOD PRESSURE: 126 MMHG | HEART RATE: 101 BPM | DIASTOLIC BLOOD PRESSURE: 82 MMHG | RESPIRATION RATE: 18 BRPM

## 2025-06-19 PROBLEM — G44.211 INTRACTABLE EPISODIC TENSION-TYPE HEADACHE: Chronic | Status: ACTIVE | Noted: 2025-06-19

## 2025-06-19 PROCEDURE — 250N000011 HC RX IP 250 OP 636: Performed by: INTERNAL MEDICINE

## 2025-06-19 PROCEDURE — 250N000013 HC RX MED GY IP 250 OP 250 PS 637: Performed by: PHYSICIAN ASSISTANT

## 2025-06-19 PROCEDURE — 250N000013 HC RX MED GY IP 250 OP 250 PS 637: Performed by: INTERNAL MEDICINE

## 2025-06-19 PROCEDURE — 99239 HOSP IP/OBS DSCHRG MGMT >30: CPT | Performed by: STUDENT IN AN ORGANIZED HEALTH CARE EDUCATION/TRAINING PROGRAM

## 2025-06-19 RX ORDER — AMITRIPTYLINE HYDROCHLORIDE 75 MG/1
75 TABLET ORAL AT BEDTIME
Qty: 60 TABLET | Refills: 0 | Status: SHIPPED | OUTPATIENT
Start: 2025-06-19

## 2025-06-19 RX ORDER — PREGABALIN 100 MG/1
100 CAPSULE ORAL
Qty: 60 CAPSULE | Refills: 0 | Status: SHIPPED | OUTPATIENT
Start: 2025-06-19

## 2025-06-19 RX ADMIN — BUSPIRONE HYDROCHLORIDE 15 MG: 15 TABLET ORAL at 08:07

## 2025-06-19 RX ADMIN — SENNOSIDES AND DOCUSATE SODIUM 1 TABLET: 50; 8.6 TABLET ORAL at 08:08

## 2025-06-19 RX ADMIN — Medication 5 ML: at 03:03

## 2025-06-19 RX ADMIN — HYDROMORPHONE HYDROCHLORIDE 0.5 MG: 1 INJECTION, SOLUTION INTRAMUSCULAR; INTRAVENOUS; SUBCUTANEOUS at 03:02

## 2025-06-19 RX ADMIN — PREGABALIN 100 MG: 100 CAPSULE ORAL at 08:07

## 2025-06-19 RX ADMIN — HYDROXYCHLOROQUINE SULFATE 200 MG: 200 TABLET, FILM COATED ORAL at 08:08

## 2025-06-19 RX ADMIN — NORETHINDRONE ACETATE 5 MG: 5 TABLET ORAL at 08:07

## 2025-06-19 RX ADMIN — ACETAMINOPHEN 650 MG: 325 TABLET, FILM COATED ORAL at 05:36

## 2025-06-19 RX ADMIN — HYDROMORPHONE HYDROCHLORIDE 0.5 MG: 1 INJECTION, SOLUTION INTRAMUSCULAR; INTRAVENOUS; SUBCUTANEOUS at 08:08

## 2025-06-19 RX ADMIN — DIBASIC SODIUM PHOSPHATE, MONOBASIC POTASSIUM PHOSPHATE AND MONOBASIC SODIUM PHOSPHATE 250 MG: 852; 155; 130 TABLET ORAL at 08:07

## 2025-06-19 RX ADMIN — PREGABALIN 100 MG: 100 CAPSULE ORAL at 11:50

## 2025-06-19 RX ADMIN — PANTOPRAZOLE SODIUM 40 MG: 40 TABLET, DELAYED RELEASE ORAL at 08:08

## 2025-06-19 RX ADMIN — CETIRIZINE HYDROCHLORIDE 10 MG: 10 TABLET, FILM COATED ORAL at 08:07

## 2025-06-19 RX ADMIN — APIXABAN 5 MG: 5 TABLET, FILM COATED ORAL at 08:07

## 2025-06-19 RX ADMIN — Medication 5 ML: at 11:50

## 2025-06-19 ASSESSMENT — ACTIVITIES OF DAILY LIVING (ADL)
ADLS_ACUITY_SCORE: 65

## 2025-06-19 NOTE — DISCHARGE SUMMARY
"[facility]  Hospitalist Discharge Summary      Date of Admission:  6/10/2025  Date of Discharge:  6/19/2025  Discharging Provider: Alexandria Mcwilliams MD  Discharge Service: Hospitalist Service    Discharge Diagnoses   Worsening neuropathy with falls in patient with chronic inflammatory demyelinating polyneuropathy - likely due to CIDP flare up   Persistent Headaches: tension vs cervicogenic, vs transformed migraine   Orthostatic dizziness  Hypophosphatemia  Constipation, improved  Hx of thrombosed hemorrhoids  Atypical chest pain- resolved  Hx of PE (4/2024 and 2019)  Obesity  Fibromyalgia  ZOHRA  Generalized anxiety disorder   Borderline personality disorder   MDD   PTSD  Hx of self injurious behavior    Clinically Significant Risk Factors     # Morbid Obesity: Estimated body mass index is 41.34 kg/m  as calculated from the following:    Height as of 6/1/25: 1.575 m (5' 2\").    Weight as of 6/9/25: 102.5 kg (226 lb).       Follow-ups Needed After Discharge   Follow-up Appointments       Follow Up      Follow up with your primary neurologist as soon as able.        Hospital Follow-up with Existing Primary Care Provider (PCP)          Schedule Primary Care visit within: 7 Days               Discharge Disposition   Discharged to home  Condition at discharge: Stable    Hospital Course   Nevin Alvarado is a 34 year old female with h/o CIDP who presented on 6/10/2025 with worsening leg pain, weakness, falls, back pain and headaches due to flare up of CIDP        Worsening neuropathy with falls in patient with chronic inflammatory demyelinating polyneuropathy - likely due to CIDP flare up  -CIDP diagnosed in 2015, characterized by the lower extremities weakness, L > R. Has been treated with weekly or biweekly IVIG [id number]. She discontinued IVIG due to lack of efficacy. Had worsening symptoms in 2024 due to flare up, treated with plasma exchange x 7 sessions, with some improvement in " strength  -follows with Dr. Chance of [facility], refer to detailed note from 5/15/25. She has appointment for EMG on 6/30 and has been referred to medical genetics.   -she typically experiences lower extremity weakness, L > R and has foot drop, worse on L. Tremors in hands, tingling and numbness in arms and sensitivity to pressure causing numbness from arms to feet.   -recent outpatient work up 5/15 showed   -normal Vit D, B12, Methylmalonic acid 0.14. CRP elevated at 7.6  -negative paraneoplastic/autoimmune panel negative  -negative SSA Ro, SSB La IgG, Glutamic acid decarboxylase Ab  -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 ([id number]), VARIANT OF UNCERTAIN SIGNIFICANCE (refer to result for complete details on recommendations)     -Brain MRI showed - No abnormal enhancement. Nonspecific changes in the brain parenchyma could be seen in the setting of migrainous symptoms.   -MRI lumbar spine showed - diffuse thickening of nerve roots with prominence to exiting nerve roots. Findings consistent with CIDP.   -MRI cervical and thoracic spine showed - No cord signal abnormality or abnormal cord enhancement      -CRP 7.6 (5/15) -> 20.8 (6/10) ->4.4 (6/13)  -ESR > 140 (6/10) -> 16 (6/13)  -LP 6/13 showed 0 nucleated cells, elevated protein 58.9  -ORVILLE Ab panel negative. JULIA borderline positive      -General Neurology following. Started IVIG on 6/14 and had 5 treatments in the hospital. Port-a-cath placed on 6/16  -will follow up with primary neurologist, Dr. Chance as outpatient. Is expected to remain on IVIG q3-4 weeks   - neurology has also recommended outpatient neuro-ophthalmology  -Home PT/OT/SN     Persistent Headaches: tension vs cervicogenic, vs transformed migraine  -headaches have been worsening for a while. Did not improve with toradol  -On brain MRI, had changes suggestive of migraine  -completed 5 days of prednisone 60 mg daily, 6/12-6/16  -lyrica increased to 100 mg 100 mg in am and noon  and 200 mg hs, amitriptyline increased to 75 mg   -headaches have now improved      Orthostatic dizziness  -has been complaining of dizziness since admission. Orthostatic vitals showed no significant drop in BP but did have tachycardia with HR in 130s on standing up and felt dizzy. Po intake has been adequate  -continue to monitor      Hypophosphatemia  -phos was low on multiple occasions. Now replaced     Constipation, improved  Hx of thrombosed hemorrhoids: Hx of this, reports no BM X1 week, not passing flatus. Some abdominal pain in lower abdomen radiating to LUQ and epigastric region. Reports that she has trialed colace, miralax, suppositories, manual disimpaction outpatient with no relief. Not on narcotics. ? If related to mounjaro. Recently had a colonoscopy with [facility] which was reported as normal, but was referred to Colorectal Surgery for thrombosed hemorrhoids. No intervention performed as pt is on Eliquis. She has subsequently developed a few more hemorrhoids.   *6/10 KUB moderate stool burden  -Colorectal Surgery consulted for significant constipation, appreciate recommendations, signed off on 6/12     Atypical chest pain- resolved  Localized to center of the chest, described as a burning. No radiation. Not aggravated with activity. No SOB. Vitals stable. No cardiac hx. ? GERD.   *EKG: sinus    Hx of PE (4/2024 and 2019)  -Resume PTA eliquis     Obesity: MAREK Morel has been on hold in the setting of recent outpatient procedures (colonoscopy, endoscopy, knee replacement), pt tried restarting, but became acutely ill with N/V, thus has remained off for now and will work with her outpatient weight management team to discuss resumption.      Fibromyalgia: Follows with [facility]. Has been put on Plaquenil 200 mg BID for unclear rheumatologic process. Defer management to them. Will continue for now.      ZOHRA: Does not tolerate CPAP      Generalized anxiety disorder   Borderline personality disorder   MDD  "  PTSD  Hx of self injurious behavior: Noted per Psychiatry note 4/2024. Reports stable mental health.   - Resume PTA meds including Buspar, Amitriptyline, PRN Klonopin and Atarax       Consultations This Hospital Stay   NEUROLOGY IP CONSULT  COLORECTAL SURGERY IP CONSULT  CARE MANAGEMENT / SOCIAL WORK IP CONSULT  PHYSICAL THERAPY ADULT IP CONSULT  PHYSICAL THERAPY ADULT IP CONSULT  OCCUPATIONAL THERAPY ADULT IP CONSULT  NUTRITION SERVICES ADULT IP CONSULT  VASCULAR ACCESS ADULT IP CONSULT  VASCULAR ACCESS ADULT IP CONSULT  INTERVENTIONAL RADIOLOGY ADULT/PEDS IP CONSULT  VASCULAR ACCESS ADULT IP CONSULT  CARE MANAGEMENT / SOCIAL WORK IP CONSULT  VASCULAR ACCESS ADULT IP CONSULT    Code Status   Full Code    Time Spent on this Encounter   I, Alexandria Mcwilliams MD, personally saw the patient today and spent greater than 30 minutes discharging this patient.       Alexandria Mcwilliams MD  [facility] NEUROSCIENCE UNIT  [address] [place] [address]  Phone: [phone]  ______________________________________________________________________    Physical Exam   Vital Signs: Temp: 99.3  F (37.4  C) Temp src: Oral BP: 126/82 Pulse: 101   Resp: 18 SpO2: 96 % O2 Device: None (Room air)    Weight: 0 lbs 0 oz  Constitutional: Awake, alert, cooperative, no apparent distress.  Eyes: Conjunctiva and pupils examined and normal.  HEENT: Moist mucous membranes, normal dentition.  Respiratory: Non-labored breathing  Cardiovascular: Regular rate   Skin: No rashes, no cyanosis, no edema.  Neurologic: please see neurology note for full neuro exam  Psychiatric: Alert, oriented to person, place and time, no obvious anxiety or depression.         Primary Care Physician   Latonya Knight    Discharge Orders      IR Chest Port Placement > 5 Yrs of Age    Patient on eliquis  Reviewed - DS    \"What is the reason for the IR order request? Central Line  Will this be a management, placement or removal of a central " "line? Placement  What type of Central Line is needed for your procedure? Port  Patients clinical information/history? CDIP requiring IVIG  Is there a specific location the exam needs to be scheduled at? [facility]  Call Back # PCP  Is the patient on aspirin, Plavix or blood thinners? Yes - Patient may be asked to stop taking medications\"     Physical Therapy  Referral      Home Care Referral      Adult Eye  Referral      Reason for your hospital stay    You were hospitalized for CIDP flare.     Activity    Your activity upon discharge: activity as tolerated     Follow Up    Follow up with your primary neurologist as soon as able.     Miscellaneous DME Supply Order (Use only if a more specific DME order does not already exist)     Diet    Follow this diet upon discharge: Current Diet:Orders Placed This Encounter      Snacks/Supplements Adult: Ensure Max Protein (bariatric); Between Meals      Regular Diet Adult     Hospital Follow-up with Existing Primary Care Provider (PCP)            Significant Results and Procedures   Most Recent 3 CBC's:  Recent Labs   Lab Test 06/13/25  1515 06/12/25  0245 06/11/25  0847 06/09/25 2020   WBC 8.3  --  6.9 9.4   HGB 14.0 13.6 14.5 14.2   MCV 89 90 91 89     --  259 293     Most Recent 3 BMP's:  Recent Labs   Lab Test 06/18/25  0622 06/13/25  0920 06/12/25  1822 06/12/25  0116 06/11/25  0847    140  --   --  140   POTASSIUM 4.5 4.4  --   --  4.6   CHLORIDE 101 107  --   --  106   CO2 29 18*  --   --  20*   BUN 18.6 12.7  --   --  13.1   CR 0.59 0.61  --   --  0.64   ANIONGAP 7 15  --   --  14   KELBY 8.7* 9.2  --   --  9.0   GLC 92 197* 153*   < > 138*    < > = values in this interval not displayed.   ,   Results for orders placed or performed during the hospital encounter of 06/10/25   XR Abdomen 2 Views    Narrative    EXAM: XR ABDOMEN 2 VIEWS  LOCATION: [facility]  DATE: 6/10/2025    INDICATION: eval stool burden, no BM X7 " days, not passing flatus. Ensure no obstruction  COMPARISON: 5/20/2025.      Impression    IMPRESSION: Nonobstructive bowel gas pattern. Moderate stool burden. No radiographic evidence of pneumoperitoneum. Cholecystectomy clips.   MR Brain w/o & w Contrast    Narrative    EXAM: MR BRAIN W/O and W CONTRAST  LOCATION: [facility]  DATE: 6/12/2025    INDICATION: Worsening headache, leg weakness  COMPARISON: 1/7/2025  CONTRAST: 10mL Gadavist  TECHNIQUE: Routine multiplanar multisequence head MRI without and with intravenous contrast.    FINDINGS:  INTRACRANIAL CONTENTS: No restricted diffusion. No mass, acute hemorrhage, or extra-axial fluid collections. Scattered nonspecific T2/FLAIR hyperintensities within the cerebral white matter most consistent with mild chronic microvascular ischemic change.   Normal ventricles and sulci. Normal position of the cerebellar tonsils.      Left frontal developmental venous anomaly incidentally noted.    SELLA: Partially empty sella.    OSSEOUS STRUCTURES/SOFT TISSUES: Normal marrow signal. The major intracranial vascular flow voids are maintained.     ORBITS: No abnormality accounting for technique.     SINUSES/MASTOIDS: No paranasal sinus mucosal disease. No middle ear or mastoid effusion.       Impression    IMPRESSION:  1.  No restricted diffusion to suggest acute ischemia. White matter changes in the brain parenchyma are nonspecific but could be seen in the setting of migrainous symptoms. The differential considerations are broad to include advanced chronic small   vessel ischemic changes as well. No abnormal enhancement on postcontrast imaging.   MR Cervical Spine w/o & w Contrast    Narrative    EXAM: MR CERVICAL SPINE W/O and W CONTRAST  LOCATION: [facility]  DATE: 6/12/2025    INDICATION: worsening leg weakness  COMPARISON: None.  CONTRAST: 10mL Gadavist  TECHNIQUE: MRI Cervical Spine without and with IV contrast.    FINDINGS:    Vertebral body heights are maintained. Marrow signal unremarkable. Disc heights are preserved. No anterolisthesis or retrolisthesis. No significant STIR edema within the vertebral column. No cord signal abnormality. The visualized posterior fossa is   unremarkable.    C2-3: Negative.    C3-4: Negative.    C4-5: Negative.    C5-6: Broad-based disc osteophyte complex without cord compression. No foraminal narrowing.    C6-7 and C7-T1 are without cord compression or foraminal narrowing.    Limited assessment of the soft tissues of the neck are unremarkable. No cord signal abnormality or abnormal cord enhancement. No abnormal vertebral body enhancement.      Impression    IMPRESSION:  1.  No cord signal abnormality or abnormal cord enhancement.     MR Thoracic Spine w/o & w Contrast    Narrative    EXAM: MR THORACIC SPINE W/O and W CONTRAST  LOCATION: [facility]  DATE: 6/12/2025    INDICATION: worsening leg weakness  COMPARISON: None.  CONTRAST: 10mL Gadavist  TECHNIQUE: Routine Thoracic Spine MRI without and with IV contrast.    FINDINGS:   Vertebral body heights are maintained. Marrow signal unremarkable. Mild loss of disc height at T5-6 with a broad-based disc osteophyte complex with partial effacement of the ventral subarachnoid space. Mild contacting of the ventral cord as a result.    The remainder of the thoracic levels are without significant cord compression or high-grade foraminal narrowing. No abnormal enhancement on postcontrast imaging.    No significant STIR edema within the vertebral column. Mild dextrocurvature of the upper thoracic spine with some compensatory levocurvature of the lower thoracic levels.      Impression     IMPRESSION:  1.  No cord signal abnormality or abnormal cord enhancement identified. Other findings as above.   MR Lumbar Spine w/o & w Contrast    Narrative    EXAM: MR LUMBAR SPINE W/O and W CONTRAST  LOCATION: [facility]  DATE:  6/12/2025    INDICATION: CIDP hx, worsening leg weakness  COMPARISON: None.  CONTRAST: 10mL Gadavist  TECHNIQUE: Routine Lumbar Spine MRI without and with IV contrast.    FINDINGS:   Vertebral body heights are maintained. Mild loss of disc height at L5-S1. No significant STIR edema within the vertebral bodies of the lumbar spine. There is extensive thickening of the nerve roots as they exit the neural foraminal distributions and   there is overall thickening of the descending nerve roots in the lumbar levels. No clear evidence of significant abnormal enhancement in the descending or exiting nerve roots.    L1-2: Negative.    L2-3: No canal stenosis or significant foraminal narrowing.    L3-4 demonstrates no canal stenosis. Mild facet disease. No significant foraminal narrowing.    L4-5: Broad-based disc bulge without canal stenosis. Facet disease without significant foraminal narrowing.    L5-S1 demonstrates no canal stenosis or significant foraminal narrowing.      Impression    IMPRESSION:  1.  There is diffuse thickening of the nerve roots in the descending portions of the nerve roots in the lumbar levels with significant prominence to the exiting nerve roots as well. Overall findings are consistent with the given diagnostic history of   CIDP. No significant abnormal enhancement identified.   XR Lumbar Puncture Spinal Tap Diagnostic    Narrative    EXAM:  1. DIAGNOSTIC LUMBAR PUNCTURE  2. FLUOROSCOPIC GUIDANCE  LOCATION: [facility]  DATE: 6/13/2025    INDICATION: CIDP concern for flare. Headache.    RADIATION DOSE: Total Air Kerma 25 mGy    PROCEDURE: Procedure and risks explained to patient and consent received. The patient was placed in prone position, and the lower back was prepped and draped in sterile fashion. 1% local lidocaine infused in local soft tissues.     Under direct fluoroscopic guidance, a 5 inch 22 gauge spinal needle was placed into the spinal canal at the L3-L4 level.  Opening pressure of 26 cm of water.    SPECIMEN: 10 mL of clear CSF sent to lab.    COMPLICATIONS: None.      Impression    IMPRESSION: Fluoroscopically guided lumbar puncture.                  IR Chest Port Placement > 5 Yrs of Age    Narrative    [facility]  LOCATION: [facility]  DATE: 6/16/2025    PROCEDURE: IMPLANTABLE VENOUS CHEST PORT PLACEMENT (POWER INJECTABLE)    INTERVENTIONAL RADIOLOGIST: Efrem Billy MD.    INDICATION: CIDP requiring long term IVIG. Port placement requested for access.    CONSENT: The risks, benefits and alternatives of implantable venous chest port placement were discussed with the patient in detail. All questions were answered. Informed consent was given to proceed with the procedure.    MODERATE SEDATION: Versed 4 mg IV; Fentanyl 200 mcg IV.  During the timeout, immediately prior to the administration of medications, the patient was reassessed for adequacy to receive conscious sedation. Under physician supervision, Versed and fentanyl   were administered for moderate sedation. Pulse oximetry, heart rate and blood pressure were continuously monitored by an independent trained observer. The physician spent 21 minutes of face-to-face sedation time with the patient.    CONTRAST: None.  ANTIBIOTICS: Ancef 2 g IV.  ADDITIONAL MEDICATIONS: None.    FLUOROSCOPIC TIME: 1.3 minutes.  RADIATION DOSE: Air Kerma: 11 mGy.    COMPLICATIONS: No immediate complications.    STERILE BARRIER TECHNIQUE: Maximum sterile barrier technique was used. Cutaneous antisepsis was performed at the operative site with application of 2% chlorhexidine and large sterile drape. Prior to the procedure, the  and assistant performed   hand hygiene and wore hat, mask, sterile gown, and sterile gloves during the entire procedure.    PROCEDURE:    Using real-time ultrasound guidance the right internal jugular vein was accessed.    A subcutaneous pocket was created and irrigated with  sterile normal saline. The catheter tubing was tunneled in an antegrade fashion from the port pocket to the dermatotomy site. Over a guidewire, and under direct fluoroscopic visualization a peel-away   sheath was advanced over the wire. Through the peel-away sheath, the catheter tubing was advanced until the tip was at the cavoatrial junction. The catheter tubing was cut to length and attached firmly to the port. The port was placed within the   subcutaneous pocket and tested. The port pocket incision was closed with layered absorbable suture and surgical glue. The dermatotomy site was closed with surgical glue.     FINDINGS:  Ultrasound demonstrates an anechoic and compressible jugular vein. A permanent image was stored.    At the completion of the study the port tip lies near the cavoatrial junction.      Impression    IMPRESSION:    1.  Successful implantable venous chest port placement as detailed above.  2.  The implantable venous chest port was placed under fluoroscopic guidance and is ready for use immediately.         US Upper Extremity Venous Duplex Right    Narrative    EXAM: US UPPER EXTREMITY VENOUS DUPLEX RIGHT  LOCATION: [facility]  DATE: 6/18/2025    INDICATION: arm pain after port and IVIG r o DVT  COMPARISON: None.  TECHNIQUE: Venous Duplex ultrasound of the right upper extremity with (when possible) and without compression, augmentation, and duplex. Color flow and spectral Doppler with waveform analysis performed.    FINDINGS: Ultrasound includes evaluation of the internal jugular vein, subclavian vein, axillary vein, and brachial vein. The superficial cephalic and basilic veins were also evaluated where seen.     RIGHT: No deep venous thrombosis. No superficial thrombophlebitis.      Impression    IMPRESSION:  1.  No deep venous thrombosis in the right upper extremity.   XR Chest 2 Views    Narrative    EXAM: XR CHEST 2 VIEWS  LOCATION: [facility]  DATE: 6/18/2025    INDICATION: significant pain s p port placement.  COMPARISON: Chest x-rays, most recently 5/12/2025. Fluoroscopic port placement dated 6/16/2025.      Impression    IMPRESSION: Right IJ Port-A-Cath tip projects over the low SVC. Otherwise negative chest.     *Note: Due to a large number of results and/or encounters for the requested time period, some results have not been displayed. A complete set of results can be found in Results Review.       Discharge Medications      Review of your medicines        UNREVIEWED medicines. Ask your doctor about these medicines        Dose / Directions   * Mounjaro 5 MG/0.5ML Soaj auto-injector pen  Used for: Type 2 diabetes mellitus without complication, without long-term current use of insulin (H)  Generic drug: tirzepatide      Dose: 5 mg  Inject 0.5 mLs (5 mg) subcutaneously every 7 days.  Quantity: 2 mL  Refills: 2     * Tirzepatide 7.5 MG/0.5ML Soaj auto-injector pen  Commonly known as: MOUNJARO  Used for: Class 3 severe obesity with serious comorbidity and body mass index (BMI) of 50.0 to 59.9 in adult, unspecified obesity type (H), Type 2 diabetes mellitus without complication, without long-term current use of insulin (H)      Dose: 7.5 mg  Inject 0.5 mLs (7.5 mg) subcutaneously once a week.  Quantity: 2 mL  Refills: 2     * tirzepatide 2.5 MG/0.5ML Soaj auto-injector pen  Commonly known as: MOUNJARO  Used for: Type 2 diabetes mellitus without complication, without long-term current use of insulin (H)      Dose: 2.5 mg  Inject 0.5 mLs (2.5 mg) subcutaneously once a week.  Quantity: 2 mL  Refills: 1           * This list has 3 medication(s) that are the same as other medications prescribed for you. Read the directions carefully, and ask your doctor or other care provider to review them with you.                CHANGE how you take these medications        Dose / Directions   amitriptyline 75 MG tablet  Commonly known as: ELAVIL  This may have  changed:   medication strength  how much to take  Used for: CIDP (chronic inflammatory demyelinating polyneuropathy) (H)      Dose: 75 mg  Take 1 tablet (75 mg) by mouth at bedtime.  Quantity: 60 tablet  Refills: 0     * pregabalin 100 MG capsule  Commonly known as: LYRICA  This may have changed: Another medication with the same name was added. Make sure you understand how and when to take each.      Dose: 200 mg  Take 200 mg by mouth every evening.  Refills: 0     * pregabalin 100 MG capsule  Commonly known as: LYRICA  This may have changed: You were already taking a medication with the same name, and this prescription was added. Make sure you understand how and when to take each.  Used for: CIDP (chronic inflammatory demyelinating polyneuropathy) (H)      Dose: 100 mg  Take 1 capsule (100 mg) by mouth daily (with lunch).  Quantity: 60 capsule  Refills: 0     * pregabalin 100 MG capsule  Commonly known as: LYRICA  This may have changed: Another medication with the same name was added. Make sure you understand how and when to take each.      Dose: 100 mg  Take 100 mg by mouth every morning. In addition to evening dose  Refills: 0           * This list has 3 medication(s) that are the same as other medications prescribed for you. Read the directions carefully, and ask your doctor or other care provider to review them with you.                CONTINUE these medicines which have NOT CHANGED        Dose / Directions   acetaminophen 500 MG tablet  Commonly known as: TYLENOL      Dose: 500-1,000 mg  Take 500-1,000 mg by mouth every 6 hours as needed for mild pain.  Refills: 0     albuterol (2.5 MG/3ML) 0.083% neb solution  Commonly known as: PROVENTIL      Dose: 2.5 mg  Take 1 vial (2.5 mg) by nebulization every 6 hours as needed for shortness of breath or wheezing  Quantity: 90 mL  Refills: 0     ammonium lactate 12 % external cream  Commonly known as: AMLACTIN      Apply topically 2 times daily. Apply to feet  Refills:  0     apixaban ANTICOAGULANT 5 MG tablet  Commonly known as: ELIQUIS      Dose: 5 mg  Take 5 mg by mouth 2 times daily.  Refills: 0     busPIRone 15 MG tablet  Commonly known as: BUSPAR      Dose: 15 mg  Take 15 mg by mouth 2 times daily.  Refills: 0     cetirizine 10 MG tablet  Commonly known as: zyrTEC      Dose: 10 mg  Take 10 mg by mouth 2 times daily.  Refills: 0     cholecalciferol 50 MCG (2000 UT) Caps      Dose: 50 mcg  Take 50 mcg by mouth daily.  Refills: 0     clonazePAM 0.5 MG tablet  Commonly known as: klonoPIN  Used for: CIDP (chronic inflammatory demyelinating polyneuropathy) (H), Acute midline back pain, unspecified back location, Anxiety disorder, unspecified type      Dose: 0.5 mg  Take 1 tablet (0.5 mg) by mouth daily as needed for anxiety  Quantity: 7 tablet  Refills: 0     cyanocobalamin 1000 MCG tablet  Commonly known as: VITAMIN B-12      Dose: 1,000 mcg  Take 1,000 mcg by mouth daily.  Refills: 0     cyclobenzaprine 5 MG tablet  Commonly known as: FLEXERIL      Dose: 5 mg  Take 5 mg by mouth 3 times daily as needed for muscle spasms.  Refills: 0     docusate sodium 100 MG capsule  Commonly known as: COLACE      Dose: 100 mg  Take 100 mg by mouth daily.  Refills: 0     ferrous sulfate 325 (65 Fe) MG tablet  Commonly known as: FEROSUL  Used for: Red blood cell antibody positive      Dose: 325 mg  Take 1 tablet (325 mg) by mouth daily (with breakfast)  Quantity: 30 tablet  Refills: 0     Fleet Enema Enem      Dose: 1 enema  Place 1 enema rectally as needed (constipation).  Refills: 0     hydrocortisone (Perianal) 2.5 % cream  Commonly known as: ANUSOL-HC      Place rectally 2 times daily as needed for hemorrhoids.  Refills: 0     hydroxychloroquine 200 MG tablet  Commonly known as: PLAQUENIL      Dose: 200 mg  Take 200 mg by mouth 2 times daily.  Refills: 0     hydrOXYzine HCl 25 MG tablet  Commonly known as: ATARAX      Dose: 25 mg  Take 25 mg by mouth every 8 hours as needed for  anxiety.  Refills: 0     lidocaine (Anorectal) 5 % Crea      Externally apply topically daily as needed.  Refills: 0     metoclopramide 5 MG tablet  Commonly known as: REGLAN      Dose: 5 mg  Take 5 mg by mouth 4 times daily as needed for vomiting.  Refills: 0     norethindrone 5 MG tablet  Commonly known as: AYGESTIN      Dose: 5 mg  Take 5 mg by mouth daily  Refills: 0     nystatin 577137 UNIT/GM external cream  Commonly known as: MYCOSTATIN  Used for: Class 3 severe obesity with serious comorbidity and body mass index (BMI) of 50.0 to 59.9 in adult, unspecified obesity type (H), Type 2 diabetes mellitus without complication, without long-term current use of insulin (H), Intertriginous candidiasis      Apply topically 2 times daily as needed for dry skin.  Quantity: 30 g  Refills: 1     ondansetron 4 MG ODT tab  Commonly known as: ZOFRAN ODT      Dose: 4 mg  Take 4 mg by mouth every 8 hours as needed for nausea.  Refills: 0     pantoprazole 40 MG EC tablet  Commonly known as: PROTONIX      Dose: 40 mg  Take 40 mg by mouth daily.  Refills: 0     polyethylene glycol 17 GM/Dose powder  Commonly known as: MIRALAX      Dose: 17 g  Take 17 g by mouth daily as needed for constipation  Refills: 0     prenatal multivitamin w/iron 27-0.8 MG tablet  Used for: Type 2 diabetes mellitus without complication, without long-term current use of insulin (H), Class 3 severe obesity with serious comorbidity and body mass index (BMI) of 50.0 to 59.9 in adult, unspecified obesity type (H)      Dose: 1 tablet  Take 1 tablet by mouth daily.  Quantity: 90 tablet  Refills: 3     sennosides 8.6 MG tablet  Commonly known as: SENOKOT      Dose: 1 tablet  Take 1 tablet by mouth 2 times daily as needed for constipation.  Refills: 0               Where to get your medicines        These medications were sent to [facility] [place], [place] - [address] [place] [address]  [address] [place] [address] [address]      Phone:  [phone]   amitriptyline 75 MG tablet       Some of these will need a paper prescription and others can be bought over the counter. Ask your nurse if you have questions.    Bring a paper prescription for each of these medications  pregabalin 100 MG capsule       Allergies   Allergies   Allergen Reactions    Influenza Vaccines Other (See Comments) and Nausea and Vomiting     HUT Reaction Type: Intolerance    Latex Rash           Oseltamivir Hives    Penicillins Anaphylaxis    Vancomycin Itching, Swelling and Rash     Flushed face, very itchy    Hydrocodone Nausea and Vomiting and GI Disturbance     vomiting for days    Blood-Group Specific Substance Other (See Comments)     Patient has anti-Cw, anti-K (Angella), Warm auto and nonspecific antibodies. Blood products may be delayed. Draw patient 24 hours prior to transfusion. Draw one red top and two purple top tubes for all type and screen orders.    Clavulanic Acid Angioedema    Haemophilus B Polysaccharide Vaccine Nausea and Vomiting    Iodinated Contrast Media Itching and Other (See Comments)     Vaginal irritation, burning    Iodine Hives and Other (See Comments)    Oxycodone Swelling    Sulfamethoxazole Angioedema and Other (See Comments)    Trimethoprim Angioedema, Swelling and Other (See Comments)    Adhesive Tape Rash     Silicone type            Band-Aid Anti-Itch      Other reaction(s): adhesive allergy    Cephalosporins Rash    Lamotrigine Rash     Possibly associated with Lamictal.     Naltrexone Other (See Comments)     Reaction(s): Vivid dreams.

## 2025-06-19 NOTE — DISCHARGE INSTRUCTIONS
HOMECARE NOTE:   Your doctor has ordered home care to help you after your hospital stay.  The staff will contact you to schedule your first visit.  This service will be provided by [facility].  If you have any question, or have not received a call within 48 hours of discharge, please call them at ***.    *please see homecare quality ratings for all homecares in your area at www.medicare.gov

## 2025-06-19 NOTE — PROGRESS NOTES
"  Neurology Progress Note   2025      Nevin Alvarado  :  1990            Interval History:        Patient seen today along with her nurse.  Over the last 24 hours she is neurologically stable .  She received last dose of IVIG last night with the premedication IV Solu-Medrol 125 mg which helped a lot to prevent tongue numbness reaction.    Headaches:  She Reports occasional blurry vision.  Noted CSF opening pressure 26 .  I offered her to work her into our neuro-ophthalmologist schedule dr disla  she politely refused \" I live 2 hours away\"  I talked to the primary team to add discharge orders referral to neuro-ophthalmology clinic to rule out \"benign intracranial hypertension\".    Noted that previously her insurance denied Ajovy injections and Nurtec tablets:  Dr. Tejada will provide her with a sample of 3 months of Ajovy for a trial, if it help with he will prescribe it again to [place] [facility].    Also she is interested about second opinion, unfortunately my clinic before refused to see her, she discharged from [facility] neuromuscular clinic after 1 quick visit.  Dr tejada as outpatient Will do a referral to [facility] neuromuscular department with the clinic summary note .    She will be discharged today to home with home health.    Patient asked me many Questions about her previous  imaging results and previous lab results.  I offered her to share the images of the MRI brain cervical thoracic and lumbar spine, she politely refused at to see the images.  I talked to her about the results of those images and what does not mean.    Q. what are those white spots in my brain?  Educated her about small vessel disease in the brain and likely due to uncontrolled hypertension and migraines.  I assured her that CSF oligoclonal bands are negative for MS.                        Physical Exam:       , Blood pressure (!) 151/91, pulse 100, temperature 98.3  F (36.8  C), temperature source " Oral, resp. rate 20, SpO2 96%, not currently breastfeeding.  There were no vitals filed for this visit.  Vital Signs with Ranges  Temp:  [98.3  F (36.8  C)-98.9  F (37.2  C)] 98.3  F (36.8  C)  Pulse:  [] 100  Resp:  [18-20] 20  BP: (138-151)/(79-91) 151/91  SpO2:  [95 %-96 %] 96 %    Exam not done today for purpose of counseling, her exam yesterday was:  Neurological Exam:  General: Mental status -Alert and orientated, speech without dysarthria, language fluent with intact naming and repetition  Cranial Nerves-  Pupils equal round and reactive to light. Extraocular movements intact. No nystagmus.  Face symmetric and intact to light touch, tongue midline, palate activates symmetrically. Shoulder shrug 5/5  Motor: Normal muscle bulk and tone.  Has 5/5 strength in bilateral upper. Lowers - hip flexion 5 on right, 4 on left , knee ext 5- on right, 4+ on left,    Ankle dorsi flexion ADF 5 on rght, < 3 on left,  plantarflexion 5-/5  Sensation:   BUE intact to LT and PP   RLE :  decrease light touch   and PP on right foot  LLE: Subjectively feels worse on left foot with decrease LT, PP up to mid shin (similar to her baseline exam )  absent vibration in left great toe and at knee   Reflexes:  patella 0.5 + , ankles 0 , toes downgoing  Cerebellar: intact FNF  Gait: could stand with walker  Sitting comfortable on chair         Medications:        Current Facility-Administered Medications   Medication Dose Route Frequency Provider Last Rate Last Admin    amitriptyline (ELAVIL) tablet 75 mg  75 mg Oral At Bedtime Nevin Zabala MD   75 mg at 06/18/25 2106    ammonium lactate (LAC-HYDRIN) 12 % lotion   Topical BID Alma Vaughan DO   Given at 06/18/25 0805    apixaban ANTICOAGULANT (ELIQUIS) tablet 5 mg  5 mg Oral BID Deanna Severino MD   5 mg at 06/19/25 0807    busPIRone (BUSPAR) tablet 15 mg  15 mg Oral BID Toya Powell PA-C   15 mg at 06/19/25 0807    cetirizine (zyrTEC) tablet 10 mg   10 mg Oral BID Toya Powell PA-C   10 mg at 06/19/25 0807    heparin lock flush 10 unit/mL injection 5-10 mL  5-10 mL Intracatheter Q24H Deanna Severino MD   5 mL at 06/18/25 2338    heparin lock flush 100 unit/mL injection 5-10 mL  5-10 mL Intracatheter Q28 Days Deanna Severino MD        hydroxychloroquine (PLAQUENIL) tablet 200 mg  200 mg Oral BID Toya Powell PA-C   200 mg at 06/19/25 0808    lidocaine (PF) (XYLOCAINE) 1 % injection 30 mL  30 mL Intradermal Once Nevin Gamble MD        norethindrone (AYGESTIN) tablet 5 mg  5 mg Oral Daily Toya Powell PA-C   5 mg at 06/19/25 0807    pantoprazole (PROTONIX) EC tablet 40 mg  40 mg Oral Daily Toya Powell PA-C   40 mg at 06/19/25 0808    phosphorus tablet 250 mg (PHOSPHA 250 NEUTRAL) per tablet 250 mg  250 mg Oral Daily Deanna Severino MD   250 mg at 06/19/25 0807    polyethylene glycol (MIRALAX) Packet 17 g  17 g Oral TID Keyonna Caban PA-C   17 g at 06/17/25 0902    pregabalin (LYRICA) capsule 100 mg  100 mg Oral QAM Toya Powell PA-C   100 mg at 06/19/25 0807    pregabalin (LYRICA) capsule 100 mg  100 mg Oral Daily with lunch Toya Powell PA-C   100 mg at 06/18/25 1253    pregabalin (LYRICA) capsule 200 mg  200 mg Oral QPM Toya Powell PA-C   200 mg at 06/18/25 2107    senna-docusate (SENOKOT-S/PERICOLACE) 8.6-50 MG per tablet 1 tablet  1 tablet Oral BID Toya Powell PA-C   1 tablet at 06/19/25 0808    Or    senna-docusate (SENOKOT-S/PERICOLACE) 8.6-50 MG per tablet 2 tablet  2 tablet Oral BID Toya Powell PA-C   2 tablet at 06/16/25 2046    sodium chloride (PF) 0.9% PF flush 10-20 mL  10-20 mL Intracatheter Q28 Days Deanna Severino MD        sodium chloride (PF) 0.9% PF flush 3 mL  3 mL Intracatheter Q8H [facility] Toya Powell PA-C   3 mL at 06/18/25 2340     PRN Meds:   Current Facility-Administered Medications   Medication Dose Route  Frequency Provider Last Rate Last Admin    acetaminophen (TYLENOL) tablet 650 mg  650 mg Oral Q4H PRN Toya Powell PA-C   650 mg at 06/19/25 0536    Or    acetaminophen (TYLENOL) Suppository 650 mg  650 mg Rectal Q4H PRN Toya Powell PA-C        albuterol (PROVENTIL HFA/VENTOLIN HFA) inhaler  1-2 puff Inhalation Once PRN Nevin Zabala MD        albuterol (PROVENTIL) neb solution 2.5 mg  2.5 mg Nebulization Once PRN Nevin Zabala MD        albuterol (PROVENTIL) neb solution 2.5 mg  2.5 mg Nebulization Q6H PRN Toya Powell PA-C        alum & mag hydroxide-simethicone (MAALOX) suspension 30 mL  30 mL Oral Q4H PRN Toya Powell PA-C        bisacodyl (DULCOLAX) suppository 10 mg  10 mg Rectal Daily PRN Toya Powell PA-C        calcium carbonate (TUMS) chewable tablet 1,000 mg  1,000 mg Oral 4x Daily PRN Toya Powell PA-C        clonazePAM (klonoPIN) tablet 0.5 mg  0.5 mg Oral Daily PRN Toya Powell PA-C        cyclobenzaprine (FLEXERIL) tablet 5 mg  5 mg Oral TID PRN Toya Powell PA-C   5 mg at 06/16/25 0848    glucose gel 15-30 g  15-30 g Oral Q15 Min PRN Osito Pena PA-C        Or    dextrose 50 % injection 25-50 mL  25-50 mL Intravenous Q15 Min PRN Osito Pena PA-C        Or    glucagon injection 1 mg  1 mg Subcutaneous Q15 Min PRN Osito Pena PA-C        diphenhydrAMINE (BENADRYL) 25 mg in sodium chloride 0.9 % 55.5 mL intermittent infusion  25 mg Intravenous Q12H PRN Maik Hess MD        EPINEPHrine (ADRENALIN) kit 0.3 mg  0.3 mg Intramuscular Q5 Min PRN Nevin Zabala MD        famotidine (PEPCID) injection 20 mg  20 mg Intravenous Once PRN Nevin Zabala MD        heparin lock flush 10 unit/mL injection 5-10 mL  5-10 mL Intracatheter Q1H PRN Deanna Severino MD   5 mL at 06/19/25 0303    HYDROmorphone (DILAUDID) tablet 2 mg  2 mg Oral Q3H  PRN Deanna Severino MD        HYDROmorphone (PF) (DILAUDID) injection 0.3 mg  0.3 mg Intravenous Q4H PRN Deanna Severino MD   0.3 mg at 06/18/25 1808    Or    HYDROmorphone (PF) (DILAUDID) injection 0.5 mg  0.5 mg Intravenous Q4H PRN Deanna Severino MD   0.5 mg at 06/19/25 0808    hydrOXYzine HCl (ATARAX) tablet 25 mg  25 mg Oral Q8H PRN Toya Powell PA-C   25 mg at 06/18/25 0045    lidocaine (LMX4) cream   Topical Once PRN Fran Pollock MD        lidocaine 1 % 0.1-1 mL  0.1-1 mL Other Q1H PRN Toya Powell PA-C        magnesium citrate solution 148 mL  148 mL Oral Once PRN Alma Vaughan DO        magnesium sulfate 2 g in 50 mL sterile water intermittent infusion  2 g Intravenous Q12H PRN Nevin Zabala MD 50 mL/hr at 06/16/25 1148 2 g at 06/16/25 1148    meperidine (DEMEROL) injection 25 mg  25 mg Intravenous Once PRN Nevin Zabala MD        naloxone (NARCAN) injection 0.2 mg  0.2 mg Intravenous Q2 Min PRN Alexandria Mcwilliams MD        Or    naloxone (NARCAN) injection 0.4 mg  0.4 mg Intravenous Q2 Min PRN Alexandria Mcwilliams MD        Or    naloxone (NARCAN) injection 0.2 mg  0.2 mg Intramuscular Q2 Min PRN Alexandria Mcwilliams MD        Or    naloxone (NARCAN) injection 0.4 mg  0.4 mg Intramuscular Q2 Min PRN Alexandria Mcwilliams MD        ondansetron (ZOFRAN ODT) ODT tab 4 mg  4 mg Oral Q6H PRN Toya Powell PA-C   4 mg at 06/15/25 1752    ondansetron (ZOFRAN) injection 8 mg  8 mg Intravenous Q8H PRN Nevin Zabala MD   8 mg at 06/16/25 1147    Patient is already receiving anticoagulation with heparin, enoxaparin (LOVENOX), warfarin (COUMADIN)  or other anticoagulant medication   Does not apply Continuous PRN Toya Powell PA-C        senna-docusate (SENOKOT-S/PERICOLACE) 8.6-50 MG per tablet 1 tablet  1 tablet Oral BID PRN Toya Powell PA-C        Or    senna-docusate (SENOKOT-S/PERICOLACE) 8.6-50 MG per  tablet 2 tablet  2 tablet Oral BID PRN Toya Powell PA-C        sodium chloride (PF) 0.9% PF flush 10-20 mL  10-20 mL Intracatheter q1 min prn Deanna Severino MD        sodium chloride (PF) 0.9% PF flush 10-20 mL  10-20 mL Intracatheter Q1H PRN Deanna Severino MD   20 mL at 06/18/25 1810    sodium chloride (PF) 0.9% PF flush 10-40 mL  10-40 mL Intracatheter Once PRN Maik Hess MD        sodium chloride (PF) 0.9% PF flush 3 mL  3 mL Intracatheter q1 min prn Toya Powell PA-C   3 mL at 06/18/25 0754    sodium chloride 0.9% BOLUS 1,000 mL  1,000 mL Intravenous Once PRN Nevin Olivares MD                Data:      All new lab and imaging data was reviewed.     Negative oligoclonal bands         Assessment and Plan:          - Hx of CIDP,axonal variant ,  worsening leg strength, possible flare.     - LP reassuring with only slight protein elevation - plan to continue  IVIG for CIDP flare        - Small vessel disease on MRI brain : stable MRI brain 6/12/2025  reassuring with no acute CNS process.  Negative MRI cervical and thoracic spinal cords .  Negative CSF oligoclonal bands .       - Headaches: tension in description and could be cervicogenic, vs transformed migraine     RECOMMENDATIONS      - finished  IVIG 0.4g/kg x 5 days  - Day 1 - 6/14 -6/18     Failed PICC attempts , She had  a Port a catheter placement  6/16       - Headaches - improved .   continue Lyrica 100 mg  twice daily and 200 m  nightly, patient like to continue the same dose on discharge  okay to continue prednisone 60mg x 5 days ( started 6/12),   Dr olivares increased amitriptyline to 75mg 6/14 at night helps , patient like to continue same dose on discharge  Continue  PRN migraine cocktail  Zofran/Mag/Benadryl     - Blurry vision and opening pressure lumbar puncture 26:  Outpatient referral to neuro-ophthalmology clinic for exam with Dr. Connolly to rule out benign intracranial hypertension    - Elevated ESR/CRP    improved from 20.8 to 4.4  - has had in past as well uncertain cause as no clear infection - pending labs JULIA screen borderline positive speckled 1:40. Negative ORVILLE panel   , seen before and cleared by rheumatology clinic no further stevenson needed      - continue PT/OT     Dispo : ok for discharge today    Talked to the hospitalist team   Talked to her nurse     No further neurology recommendations will sign off   Please page or call me with any questions      she will follow up with dr Chance in [facility] clinic  of neurology noted plans for outpatient EMG     35 min spent on review of chart, exam, care coordination, and documentation on date of service     April Ro MD

## 2025-06-19 NOTE — PROVIDER NOTIFICATION
"MD Notification    Notified Person: MD    Notified Person Name: Deanna NEELY     Notification Date/Time: June 18, 2025     Notification Interaction: vocera message     Purpose of Notification:  1052:Pt has been having pain at chest port site since 6/17 overnight. Rates it at 7/10, describes it as \"sharp\" and a \"pulling\" sensation at site. She also felt aching in R arm towards the end of IVIG infusion yesterday, Please advise  Per hospitalist to notify IR.   Comments: IR was notified, suggested ordering chest x-ray if concerned, MD ordered chest x-ray. chest x-ray resulted at 1314, per hospitalist chest port ok to use.     1105:shes feeling dizzy today, orthostatics negative. Her HR dose go up to 130 when standing. lying down hr 103, just dizzy when standing.  Per provider no change to plan of care.   "

## 2025-06-19 NOTE — PROGRESS NOTES
Patient expressed discomfort with discharging home. This writer was able to get a hold of the neurologist (Dr. Ro) to go over test results with patient. This writer was in the room with  for over 30 minutes while the provider was answering pt's questions and any other concerns she may have had. Patient and Dr. Ro agreed on discharging home today.

## 2025-06-19 NOTE — PROGRESS NOTES
"Care Management Discharge Note    Discharge Date: 06/19/2025       Discharge Disposition: Home, Home Care    Discharge Services: None    Discharge DME: None    Discharge Transportation: other (see comments) (has either hc workers that A or use [facility])    Private pay costs discussed: Not applicable    Does the patient's insurance plan have a 3 day qualifying hospital stay waiver?  No    PAS Confirmation Code:    Patient/family educated on Medicare website which has current facility and service quality ratings: no    Education Provided on the Discharge Plan:    Persons Notified of Discharge Plans:   Patient/Family in Agreement with the Plan: yes    Handoff Referral Completed: No, handoff not indicated or clinically appropriate    Additional Information:  Met with patient.  She stated the IVIG infusion went better yesterday and she feels she is ready for discharge today.  She stated she had a long discussion with Neurologist.  Asked about her ILS worker and she stated they come about 3 times per week.  She is checking to see if they will be coming this weekend.  She is also working on scheduling Neurology follow up with Dr. Chance and with her PCP.  Answered her questions about home care.  She also requested WC transport be set up.  She is in the hospital for \"worsening neuropathy with falls in patient with chronic inflammatory demyelinating polyneuropathy - likely due to CIDP flare up\" and PT recommends use of walker with all mobility.  She does not have her walker with her.    E WC transport scheduled for [id number].    Faxed home care orders.      Lola Elise RN, BSN, PHN  Inpatient Care Coordination  [facility]  Phone: [phone]        "

## 2025-06-19 NOTE — PLAN OF CARE
Goal Outcome Evaluation:    Pt A&O x4. On room air, Chest port deaccessed. Verbalized understanding of discharge documents. Patient sent home with scheduled ride.

## 2025-06-19 NOTE — PLAN OF CARE
Goal Outcome Evaluation:           Overall Patient Progress: improvingOverall Patient Progress: improving       Reason for Admission: Worsening neuropathy, falls, chronic inflammatory demyelinating polyneuropathy    Cognitive/Mentation: A/Ox 4  Neuros/CMS: L top of foot numbness, baseline. L foot weak dorsi/plantar flexion.  VS: VSS on RA. SBP <180.     /GI: Continent. Last BM 6/18/25.   Pulmonary: LS clear.  Pain: Chest port site, 5-7/10. Managed with PRN Tylenol x1 and Dilaudid x2, ice packs.    Drains/Lines: Chest port, CDI, SL.  Skin: Dry heels.  Activity: Assist x 1 with GBW.  Diet: Regular with thin liquids. Takes pills whole with water.     Therapies recs: Home with home care  Discharge: Likely today, 6/19    Aggression Stoplight Tool: Green    End of shift summary: Received IVIG dose 5/5.

## 2025-06-19 NOTE — PROGRESS NOTES
"Reason for Admission: Worsening neuropathy, falls, chronic inflammatory demyelinating polyneuropathy    Cognitive/Mentation: A/Ox 4  Neuros/CMS: Intact ex top of L foot numb, diminished sensation from L shin down (neurologist was at bedside and aware, no change to plan of care). L foot weak dorsi/plantar flexion.   VS: BP (!) 149/83 (BP Location: Left arm)   Pulse 111   Temp 98.6  F (37  C) (Oral)   Resp 18   LMP  (LMP Unknown)   SpO2 96%   Breastfeeding No .   /GI: Voiding, Continent. Last BM 6/18.   Pulmonary: LS clear.  Pain: pain at chest port site at 7/10, received PRN dilaudid and tylenol, and applied ice packs.    Drains/Lines: Chest port, CDI.  Skin: dry heels  Activity: Assist x 1 with gait belt and walker.  Diet: Reg with thin liquids. Takes pills whole.     Therapies recs: home with home care  Discharge: pending    Aggression Stoplight Tool: green    End of shift summary: Chest x-ray completed due to continuous pain at chest port site, per hospitalist ok to use chest port. Ultrasound of RUE ordered by neurologist due to Pt reporting an \"achy\" sensation in RUE during IVIG infusion, completed, negative. Pt reported tongue felt \"raw\" and \"swollen\" since IVIG infusion from last night, on assessment no tongue swelling noted, neurologist aware and ordered PRN solu-medrol injection to be given tonight before IVIG. Pt reported feeling dizzy today, neurologist at bedside who performed orthostatic BP's with Pt and writer, Orthostatic BP's were negative. HR did go up to 122-130 when standing, hospitalist aware and no change to plan of care. Pt denies chest pain or shortness of breath.             "

## 2025-06-20 ENCOUNTER — APPOINTMENT (OUTPATIENT)
Dept: GENERAL RADIOLOGY | Facility: CLINIC | Age: 35
End: 2025-06-20
Attending: EMERGENCY MEDICINE
Payer: COMMERCIAL

## 2025-06-20 ENCOUNTER — HOSPITAL ENCOUNTER (EMERGENCY)
Facility: CLINIC | Age: 35
Discharge: HOME | End: 2025-06-21
Attending: EMERGENCY MEDICINE | Admitting: EMERGENCY MEDICINE
Payer: COMMERCIAL

## 2025-06-20 DIAGNOSIS — R07.89 CHEST WALL PAIN: ICD-10-CM

## 2025-06-20 DIAGNOSIS — Z45.2 ENCOUNTER FOR CARE RELATED TO VASCULAR ACCESS PORT: ICD-10-CM

## 2025-06-20 LAB
ANION GAP SERPL CALCULATED.3IONS-SCNC: 12 MMOL/L (ref 7–15)
BASOPHILS # BLD AUTO: 0.1 10E3/UL (ref 0–0.2)
BASOPHILS NFR BLD AUTO: 0 %
BUN SERPL-MCNC: 17.4 MG/DL (ref 6–20)
CALCIUM SERPL-MCNC: 8.8 MG/DL (ref 8.8–10.4)
CHLORIDE SERPL-SCNC: 100 MMOL/L (ref 98–107)
CREAT SERPL-MCNC: 0.55 MG/DL (ref 0.51–0.95)
EGFRCR SERPLBLD CKD-EPI 2021: >90 ML/MIN/1.73M2
EOSINOPHIL # BLD AUTO: 0.1 10E3/UL (ref 0–0.7)
EOSINOPHIL NFR BLD AUTO: 1 %
ERYTHROCYTE [DISTWIDTH] IN BLOOD BY AUTOMATED COUNT: 14.6 % (ref 10–15)
GLUCOSE SERPL-MCNC: 106 MG/DL (ref 70–99)
HCO3 SERPL-SCNC: 25 MMOL/L (ref 22–29)
HCT VFR BLD AUTO: 38.7 % (ref 35–47)
HGB BLD-MCNC: 12.7 G/DL (ref 11.7–15.7)
IMM GRANULOCYTES # BLD: 0.1 10E3/UL
IMM GRANULOCYTES NFR BLD: 1 %
LYMPHOCYTES # BLD AUTO: 3.8 10E3/UL (ref 0.8–5.3)
LYMPHOCYTES NFR BLD AUTO: 31 %
MCH RBC QN AUTO: 30.2 PG (ref 26.5–33)
MCHC RBC AUTO-ENTMCNC: 32.8 G/DL (ref 31.5–36.5)
MCV RBC AUTO: 92 FL (ref 78–100)
MONOCYTES # BLD AUTO: 0.8 10E3/UL (ref 0–1.3)
MONOCYTES NFR BLD AUTO: 6 %
NEUTROPHILS # BLD AUTO: 7.5 10E3/UL (ref 1.6–8.3)
NEUTROPHILS NFR BLD AUTO: 61 %
NRBC # BLD AUTO: 0 10E3/UL
NRBC BLD AUTO-RTO: 0 /100
PLATELET # BLD AUTO: 226 10E3/UL (ref 150–450)
POTASSIUM SERPL-SCNC: 3.8 MMOL/L (ref 3.4–5.3)
RBC # BLD AUTO: 4.21 10E6/UL (ref 3.8–5.2)
SODIUM SERPL-SCNC: 137 MMOL/L (ref 135–145)
WBC # BLD AUTO: 12.2 10E3/UL (ref 4–11)

## 2025-06-20 PROCEDURE — 80048 BASIC METABOLIC PNL TOTAL CA: CPT | Performed by: EMERGENCY MEDICINE

## 2025-06-20 PROCEDURE — 250N000013 HC RX MED GY IP 250 OP 250 PS 637: Performed by: EMERGENCY MEDICINE

## 2025-06-20 PROCEDURE — 99285 EMERGENCY DEPT VISIT HI MDM: CPT | Mod: 25 | Performed by: EMERGENCY MEDICINE

## 2025-06-20 PROCEDURE — 85025 COMPLETE CBC W/AUTO DIFF WBC: CPT | Performed by: EMERGENCY MEDICINE

## 2025-06-20 PROCEDURE — 250N000011 HC RX IP 250 OP 636: Performed by: EMERGENCY MEDICINE

## 2025-06-20 PROCEDURE — 36415 COLL VENOUS BLD VENIPUNCTURE: CPT | Performed by: EMERGENCY MEDICINE

## 2025-06-20 PROCEDURE — 71046 X-RAY EXAM CHEST 2 VIEWS: CPT

## 2025-06-20 PROCEDURE — 93005 ELECTROCARDIOGRAM TRACING: CPT | Performed by: EMERGENCY MEDICINE

## 2025-06-20 RX ORDER — ONDANSETRON 4 MG/1
4 TABLET, ORALLY DISINTEGRATING ORAL ONCE
Status: COMPLETED | OUTPATIENT
Start: 2025-06-20 | End: 2025-06-20

## 2025-06-20 RX ORDER — HYDROCODONE BITARTRATE AND ACETAMINOPHEN 5; 325 MG/1; MG/1
2 TABLET ORAL ONCE
Refills: 0 | Status: DISCONTINUED | OUTPATIENT
Start: 2025-06-20 | End: 2025-06-21 | Stop reason: HOSPADM

## 2025-06-20 RX ADMIN — ONDANSETRON 4 MG: 4 TABLET, ORALLY DISINTEGRATING ORAL at 23:17

## 2025-06-20 RX ADMIN — HYDROMORPHONE HYDROCHLORIDE 1 MG: 2 TABLET ORAL at 23:17

## 2025-06-20 ASSESSMENT — ACTIVITIES OF DAILY LIVING (ADL)
ADLS_ACUITY_SCORE: 62

## 2025-06-20 NOTE — PROGRESS NOTES
Occupational Therapy Discharge Summary    Reason for therapy discharge:    Discharged to home with home therapy.    Progress towards therapy goal(s). See goals on Care Plan in [facility] electronic health record for goal details.  Goals partially met.  Barriers to achieving goals:   discharge from facility.    Therapy recommendation(s):    Continued therapy is recommended.  Rationale/Recommendations:   Per chart, Recommendation for HHOT for home safety eval, assist modifying/adapting home environment for inc ind, progress act douglas. Pt in agreement for HHOT.

## 2025-06-20 NOTE — PLAN OF CARE
Physical Therapy Discharge Summary    Reason for therapy discharge:    Discharged to home with home therapy.    Progress towards therapy goal(s). See goals on Care Plan in [facility] electronic health record for goal details.  Goals partially met.  Barriers to achieving goals:   discharge from facility.    Therapy recommendation(s):    Continued therapy is recommended.  Rationale/Recommendations:  Patient is mobilizing with SBA using WW; not as steady or strong to ambulate without AD at this time. Anticipate by time of discharge patient will be modified I with her WW and be able to function in apartment setting. Recommend  PT to see and progress strength and functional mobility/endurance.

## 2025-06-21 ENCOUNTER — NURSE TRIAGE (OUTPATIENT)
Dept: NURSING | Facility: CLINIC | Age: 35
End: 2025-06-21
Payer: COMMERCIAL

## 2025-06-21 ENCOUNTER — PATIENT OUTREACH (OUTPATIENT)
Dept: CARE COORDINATION | Facility: CLINIC | Age: 35
End: 2025-06-21
Payer: COMMERCIAL

## 2025-06-21 VITALS
SYSTOLIC BLOOD PRESSURE: 113 MMHG | DIASTOLIC BLOOD PRESSURE: 71 MMHG | RESPIRATION RATE: 18 BRPM | OXYGEN SATURATION: 98 % | HEART RATE: 99 BPM | BODY MASS INDEX: 42.33 KG/M2 | TEMPERATURE: 98.4 F | WEIGHT: 230 LBS | HEIGHT: 62 IN

## 2025-06-21 PROCEDURE — 250N000013 HC RX MED GY IP 250 OP 250 PS 637: Performed by: EMERGENCY MEDICINE

## 2025-06-21 PROCEDURE — 250N000011 HC RX IP 250 OP 636: Performed by: EMERGENCY MEDICINE

## 2025-06-21 RX ORDER — HEPARIN SODIUM (PORCINE) LOCK FLUSH IV SOLN 100 UNIT/ML 100 UNIT/ML
100 SOLUTION INTRAVENOUS ONCE
Status: COMPLETED | OUTPATIENT
Start: 2025-06-21 | End: 2025-06-21

## 2025-06-21 RX ORDER — HEPARIN SODIUM (PORCINE) LOCK FLUSH IV SOLN 100 UNIT/ML 100 UNIT/ML
100 SOLUTION INTRAVENOUS ONCE
Status: DISCONTINUED | OUTPATIENT
Start: 2025-06-21 | End: 2025-06-21

## 2025-06-21 RX ORDER — HEPARIN SODIUM 1000 [USP'U]/ML
1000 INJECTION, SOLUTION INTRAVENOUS; SUBCUTANEOUS ONCE
Status: DISCONTINUED | OUTPATIENT
Start: 2025-06-21 | End: 2025-06-21

## 2025-06-21 RX ADMIN — HYDROMORPHONE HYDROCHLORIDE 1 MG: 2 TABLET ORAL at 00:17

## 2025-06-21 RX ADMIN — Medication 100 UNITS: at 01:19

## 2025-06-21 ASSESSMENT — ACTIVITIES OF DAILY LIVING (ADL): ADLS_ACUITY_SCORE: 62

## 2025-06-21 NOTE — ED TRIAGE NOTES
Pt BIBA from home. Pt c/o central line problem. Pt states recently discharged from hospital yesterday. Endorses pain at port site, R side of neck and R arm.       Triage Assessment (Adult)       Row Name 06/20/25 0674          Triage Assessment    Airway WDL WDL        Respiratory WDL    Respiratory WDL WDL        Skin Circulation/Temperature WDL    Skin Circulation/Temperature WDL WDL        Cardiac WDL    Cardiac WDL chest pain        Peripheral/Neurovascular WDL    Peripheral Neurovascular WDL WDL        Cognitive/Neuro/Behavioral WDL    Cognitive/Neuro/Behavioral WDL WDL        Eliazar Coma Scale    Best Eye Response 4-->(E4) spontaneous     Best Motor Response 6-->(M6) obeys commands     Best Verbal Response 5-->(V5) oriented     Eliazar Coma Scale Score 15

## 2025-06-21 NOTE — ED PROVIDER NOTES
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 size. Lungs clear. Scoliosis. No change.          EKG   ECG results from 06/20/25   EKG 12 lead     Value    Systolic Blood Pressure     Diastolic Blood Pressure     Ventricular Rate 107    Atrial Rate 107    MN Interval 132    QRS Duration 68         QTc 405    P Axis 38    R AXIS 70    T Axis 24    Interpretation ECG      Sinus tachycardia  Otherwise normal ECG  When compared with ECG of 10-Kaden-2025 11:33,  No significant change was found  Reviewed by Lalo GRIMES     *Note: Due to a large number of results and/or encounters for the requested time period, some results have not been displayed. A complete set of results can be found in Results Review.           Independent Interpretation  Yes I have independently reviewed the patient's chest x-ray, no obvious pneumothorax noted.      ED Course      Medications Administered   Medications   ondansetron (ZOFRAN ODT) ODT tab 4 mg (4 mg Oral $Given 6/20/25 2317)   HYDROmorphone (DILAUDID) half-tab 1 mg (1 mg Oral $Given 6/20/25 2317)   HYDROmorphone (DILAUDID) half-tab 1 mg (1 mg Oral $Given 6/21/25 0017)   heparin lock flush 100 unit/mL injection 100 Units (100 Units Intracatheter $Given 6/21/25 0119)                Optional/Additional Documentation  Stress/Adjustment Disorders       Medical Decision Making / Diagnosis         MDM  This is a very pleasant 34-year-old female who presents to the emergency room with what appears to be pain involving her recent port placement.  She states that it is pain has been present since it was put in and has not really changed over the last 4 to 5 days.  It seems to work very well at least on discharge and here in the ER were able to infuse through it.  X-ray shows no pneumothorax.  I see no overlying skin changes, nothing to evidence infection or allergic reaction, and unable to palpate along the entire line and there seems to be only distractible tenderness and she seems to have equivalent tenderness to other areas on similar palpation as well.  I do not think it is worth calling IR and having them evaluate the port as it seems to be working fine and I do not think that removal at this time should be considered, and the patient is on board with this as well.  She continued  to ask for oral pain meds and did note that this is not the same as her chronic pain and I did give her oral Dilaudid as she states that she cannot take Percocet or Norco based on allergies.  I do appreciate her care plan and history of secondary gain, but the seem to be in acute discomfort and she knows that she will not be going home with any additional prescription narcotics.  Red flags when to come back to the ER discussed in detail, patient was understandably frustrated that no clear cause of the symptoms was found but did feel comfortable going home.      ICD-10 Codes:    ICD-10-CM    1. Chest wall pain  R07.89       2. Encounter for care related to vascular access port  Z45.2              Discharge Medications  Discharge Medication List as of 6/21/2025  1:25 AM                     Fausto May MD  06/21/25 0511

## 2025-06-21 NOTE — TELEPHONE ENCOUNTER
Pt, prior to talking about  reason she called,  was asked if she can wait while RN spoke with on call MD who can return call while she is talking to RN, after MD was paged by RN, pt agreed. After call back from MD and RN completed phone call with on call MD, pt was no longer on the phone.  Pt was called back at 4:43 PM, and left a  with [facility] phone number to call back and ask  to speak with RN.

## 2025-06-21 NOTE — TELEPHONE ENCOUNTER
Nurse Triage SBAR    Is this a 2nd Level Triage? NO    Situation: Problem w/ site of new Infusion Port.    Background: Nevin had an infusion port placed by IR while she was recently hospitalized from 6/10 to 6/19.    She had an infusion Port placed on 6/16/25 by Efrem Billy MD.    She reports that Nursing incorrectly dressed the site and this caused the Dermabond adhesive to be pulled off prematurely instead of allowing it to peel off over time.    She is very frustrated and upset at this time as she has not been able to contact any assistance with the numbers that she was given to contact IR.    The numbers she was supplied:    IR office - [phone]  - She states that she was told that there was 24h Nurse Triage available from this number  - IR Triage recording states 7:30 am to 4:30 pm, Mon thru Fri    Other numbers  - [phone] -- 5:08 pm - this number seems to be for a Pager; No identifying info  - [phone] -- 5:09 pm - VM for Diaz Magdaleno NP (ext [phone])    Assessment: as noted above    Protocol Recommended Disposition:    Now  Phone number provided for [facility] - [phone]    5:21 pm - Warm tx'd to page , marisela Wilson/ [facility] IR answering service.    Does the patient meet one of the following criteria for ADS visit consideration? No    Quynh Ruff RN  [facility]      Reason for Disposition   [1] Follow-up call from patient regarding patient's clinical status AND [2] information urgent     Post port placement by IR    Protocols used: PCP Call - No Triage-A-

## 2025-06-21 NOTE — DISCHARGE INSTRUCTIONS
As we discussed it does look like the port is working quite well here today, and I do not see any reason for your pain unfortunately.  I do not see any evidence of cellulitis or of an infection or an allergic reaction, and I think you should do your best to take over-the-counter medications and your normal home medications to help with your pain.  Some discomfort is to be expected, please also follow-up with the regular doctor that you are most easily able to see on Monday to make sure that the skin overlying the port is not getting infected or getting worse.  Come back with any other concerns, any fevers, or any other new symptoms that develop.

## 2025-06-21 NOTE — PROGRESS NOTES
[facility]: Transitions of Care Outreach  Chief Complaint   Patient presents with    Clinic Care Coordination - Post Hospital       Most Recent Admission Date: 6/20/2025   Most Recent Admission Diagnosis:      Most Recent Discharge Date: 6/21/2025   Most Recent Discharge Diagnosis: Chest wall pain - R07.89  Encounter for care related to vascular access port - Z45.2     Transitions of Care Assessment    Discharge Assessment  How are you doing now that you are home?: Patient states she is doing okay right now. Patient states she feels the same, the pain is still there from incision site. States depending on what she is doing makes the pain vary.  How are your symptoms? (Red Flag symptoms escalate to triage hotline per guidelines): Unchanged  Do you know how to contact your clinic care team if you have future questions or changes to your health status? : Yes  Does the patient have their discharge instructions? : Yes  Does the patient have questions regarding their discharge instructions? : No  Were you started on any new medications or were there changes to any of your previous medications? : Yes  Does the patient have all of their medications?: No (see comment) (The prescription for the Lyrica was written for once daily she takes it 3x daily and her pharmacy was unable to fill it.  Patient has enough to get through until next appointment.)  Do you have questions regarding any of your medications? : No  Do you have all of your needed medical supplies or equipment (DME)?  (i.e. oxygen tank, CPAP, cane, etc.): Yes         Post-op (Clinicians Only)  Did the patient have surgery or a procedure: Yes  Incision: closed  Drainage: No  Bleeding: none  Fever: No  Chills: No  Redness: No  Warmth: No  Swelling: No  Incision site pain: Yes (5/10 Currently)  Eating & Drinking: eating and drinking without complaints/concerns  Additional Symptoms:  (denies)  Bowel Function: loose stools  Date of last BM:  06/20/25  Urinary Status: voiding without complaint/concerns    RN offered to see if hospital was able to assist with error on Lyrica prescription.  Patient declined. Patient has appointment on Tuesday. Patient also states the IR number she was given was not 24/7. RN asked if patient had needs at this time for IR and she stated no. RN offered to get her to IR if she had concerns at this time, patient declined.    Follow up Plan     Discharge Follow-Up  Discharge follow up appointment scheduled in alignment with recommended follow up timeframe or Transitions of Risk Category? (Low = within 30 days; Moderate= within 14 days; High= within 7 days): Yes  Discharge Follow Up Appointment Date: 06/24/25 (PCP 6/26/25)  Discharge Follow Up Appointment Scheduled with?: Specialty Care Provider    Future Appointments   Date Time Provider Department Center   6/26/2025  2:00 PM Sharon Toro APRN CNP [facility]       Outpatient Plan as outlined on AVS reviewed with patient.    For any urgent concerns, please contact our 24 hour nurse triage line: [phone] ([phone])       Marianela Jj RN

## 2025-06-22 ENCOUNTER — HOSPITAL ENCOUNTER (EMERGENCY)
Facility: CLINIC | Age: 35
Discharge: HOME | End: 2025-06-22
Attending: SOCIAL WORKER | Admitting: SOCIAL WORKER
Payer: COMMERCIAL

## 2025-06-22 VITALS
TEMPERATURE: 99 F | OXYGEN SATURATION: 96 % | HEART RATE: 103 BPM | RESPIRATION RATE: 12 BRPM | DIASTOLIC BLOOD PRESSURE: 68 MMHG | SYSTOLIC BLOOD PRESSURE: 119 MMHG

## 2025-06-22 DIAGNOSIS — T50.901A ACCIDENTAL DRUG OVERDOSE, INITIAL ENCOUNTER: ICD-10-CM

## 2025-06-22 LAB
ATRIAL RATE - MUSE: 107 BPM
DIASTOLIC BLOOD PRESSURE - MUSE: NORMAL MMHG
INTERPRETATION ECG - MUSE: NORMAL
P AXIS - MUSE: 38 DEGREES
PR INTERVAL - MUSE: 132 MS
QRS DURATION - MUSE: 68 MS
QT - MUSE: 304 MS
QTC - MUSE: 405 MS
R AXIS - MUSE: 70 DEGREES
SYSTOLIC BLOOD PRESSURE - MUSE: NORMAL MMHG
T AXIS - MUSE: 24 DEGREES
VENTRICULAR RATE- MUSE: 107 BPM

## 2025-06-22 PROCEDURE — 250N000011 HC RX IP 250 OP 636: Performed by: SOCIAL WORKER

## 2025-06-22 PROCEDURE — 99284 EMERGENCY DEPT VISIT MOD MDM: CPT | Mod: 25

## 2025-06-22 PROCEDURE — 96374 THER/PROPH/DIAG INJ IV PUSH: CPT

## 2025-06-22 PROCEDURE — 258N000003 HC RX IP 258 OP 636: Performed by: SOCIAL WORKER

## 2025-06-22 PROCEDURE — 96361 HYDRATE IV INFUSION ADD-ON: CPT

## 2025-06-22 RX ORDER — HEPARIN SODIUM (PORCINE) LOCK FLUSH IV SOLN 100 UNIT/ML 100 UNIT/ML
5-10 SOLUTION INTRAVENOUS
Status: DISCONTINUED | OUTPATIENT
Start: 2025-06-22 | End: 2025-06-22 | Stop reason: HOSPADM

## 2025-06-22 RX ORDER — ONDANSETRON 2 MG/ML
4 INJECTION INTRAMUSCULAR; INTRAVENOUS ONCE
Status: COMPLETED | OUTPATIENT
Start: 2025-06-22 | End: 2025-06-22

## 2025-06-22 RX ORDER — HEPARIN SODIUM,PORCINE 10 UNIT/ML
5-10 VIAL (ML) INTRAVENOUS EVERY 24 HOURS
Status: DISCONTINUED | OUTPATIENT
Start: 2025-06-22 | End: 2025-06-22 | Stop reason: HOSPADM

## 2025-06-22 RX ORDER — HEPARIN SODIUM,PORCINE 10 UNIT/ML
5-10 VIAL (ML) INTRAVENOUS
Status: DISCONTINUED | OUTPATIENT
Start: 2025-06-22 | End: 2025-06-22 | Stop reason: HOSPADM

## 2025-06-22 RX ADMIN — Medication 5 ML: at 21:17

## 2025-06-22 RX ADMIN — SODIUM CHLORIDE 1000 ML: 9 INJECTION, SOLUTION INTRAVENOUS at 19:40

## 2025-06-22 RX ADMIN — ONDANSETRON 4 MG: 2 INJECTION INTRAMUSCULAR; INTRAVENOUS at 20:07

## 2025-06-22 RX ADMIN — HEPARIN, PORCINE (PF) 10 UNIT/ML INTRAVENOUS SYRINGE 5 ML: at 21:15

## 2025-06-22 ASSESSMENT — ACTIVITIES OF DAILY LIVING (ADL)
ADLS_ACUITY_SCORE: 62

## 2025-06-22 NOTE — ED PROVIDER NOTES
Emergency Department Note      History of Present Illness     Chief Complaint   Drug Overdose      HPI   Nevin Alvarado is a 34 year old female with a history of anorexia nervosa with bulimia, CIDP, depression with suicide attempt, type II diabetes, and PE, anticoagulated on Eliquis who presents to the Emergency Department for unintentional drug overdose. The patient reports she accidentally took the wrong dose of Dilaudid. Reports that instead of taking 6 mg of Dilaudid, she took 12 mg of Dilaudid. Also took 50mg of hydroxyzine, she is prescribed 25-50mg PRN. She endorses feeling fatigued and lightheaded. Reports that before this occurred, she was in her usual state of health although does note she is dealing with chronic pain and anxiety. She adamantly denies this was a self-harm attempt. She did nto take anything else.     Independent Historian   None    Review of External Notes   I reviewed the patient's 6/10/25 ED note in which she presented for worsening leg pain, weakness, falls, back pain, and headaches. At this time these symptoms were thought to be from a CIDP flare up.     I reviewed the Emergency Care Plan     Past Medical History     Medical History and Problem List   ADD  Anorexia nervosa with bulimia   Anxiety   Asthma   Borderline personality disorder   CIDP   Cholelithiasis   Depression with suicide attempt  GERD  Migraine   Neuropathy  ZOHRA  PE  PTSD  Rectal and swallowed foreign bodies   SNHL   Scoliosis   Type II diabetes     Medications   Amitriptyline   Apixaban ANTICOAGULANT   busPIRone   Hydroxychloroquine   Norethindrone   Pantoprazole   Pregabalin     Surgical History  Foreign body removal   Esophageal stent replacement   IR chest port placement  IR CVC tunnel placement and removal   Right knee operation  Endometriosis ablation  Exploratory laparotomy x2  Mammoplasty reduction  PICC double lumen placement   Bilateral carpal tunnel release     Physical Exam     Patient Vitals for the  past 24 hrs:   BP Temp Temp src Pulse Resp SpO2   06/22/25 2055 -- -- -- 103 -- 96 %   06/22/25 2045 -- -- -- -- -- 98 %   06/22/25 2041 -- -- -- -- -- 98 %   06/22/25 2030 119/68 -- -- 109 -- 96 %   06/22/25 2000 (!) 140/66 -- -- 113 -- 98 %   06/22/25 1958 -- -- -- 108 -- 98 %   06/22/25 1931 (!) 141/76 -- -- 117 -- 98 %   06/22/25 1901 (!) 204/116 -- -- 119 -- 94 %   06/22/25 1830 (!) 209/109 -- -- 117 -- 97 %   06/22/25 1800 (!) 193/115 -- -- -- -- 99 %   06/22/25 1745 (!) 180/118 -- -- -- -- 99 %   06/22/25 1738 (!) 180/118 99  F (37.2  C) Oral 117 12 98 %     Physical Exam  General: Overall stable and nontoxic appearing  HEENT: Conjunctivae clear,  pupils are not pinpoint  Neuro: Alert, moving all extremities equally with intention  CV: Borderline tachycardic rate, regular rhythm, radial and DP pulses equal  Respiratory: No bradypnea. No signs of respiratory distress.   Abdomen: Soft, without rigidity or rebound throughout  MSK: No lower extremity swelling or tenderness   Skin: Port site at right chest wall is intact without erythema or warmth     Diagnostics     Lab Results   Labs Ordered and Resulted from Time of ED Arrival to Time of ED Departure - No data to display    Imaging   No orders to display     Independent Interpretation   None    ED Course      Medications Administered   Medications   sodium chloride 0.9% BOLUS 1,000 mL (0 mLs Intravenous Stopped 6/22/25 2048)   ondansetron (ZOFRAN) injection 4 mg (4 mg Intravenous $Given 6/22/25 2007)       Procedures   None     Discussion of Management   None    ED Course   ED Course as of 06/23/25 0103   Sun Jun 22, 2025 1902 I evaluated the patient, obtained history, and performed a physical exam as detailed above.    2057 I rechecked the patient and updated them on the plan of care. The patient is safe for discharge.        Additional Documentation  None    Medical Decision Making / Diagnosis     CMS Diagnoses: None    MIPS   None    BEATRIZ ZULUAGA  Jenny is a 34 year old female with above medical history who presents to the emergency department with a chief complaint of an accidental overdose.  Patient is very adamant that she did not take this as an attempt to harm herself. Prior to this she was in her usual sate of health without any recent illnesses.  She took an appropriate dosage of hydroxyzine and took twice is much as her usual Dilaudid.  Here in the emergency department, she is stable and nontoxic-appearing.  She has absolutely no bradypnea. Initial blood pressures were obtained however this was using a cuff that was not sized appropriately.  With appropriately sized cuff, she was not hypertensive.  Monitor showed sinus tachycardia.  She received fluids for this and this improved.  She felt improved from her lightheadedness.  Discussed overall low concern given her stability over her period of observation in the ED.  Counseled follow-up with her primary care provider.  She is comfortable with plan for discharge at this time.    Disposition   The patient was discharged.     Diagnosis     ICD-10-CM    1. Accidental drug overdose, initial encounter  T50.901A            Discharge Medications   Discharge Medication List as of 6/22/2025  9:14 PM        Scribe Disclosure:  I, Carli Navarro, am serving as a scribe at 6:39 PM on 6/22/2025 to document services personally performed by Mary Carmen Danielson MD based on my observations and the provider's statements to me.        Mary Carmen Danielson MD  06/23/25 0106       Mary Carmen Danielson MD  06/23/25 0128

## 2025-06-22 NOTE — ED TRIAGE NOTES
Pt take dilaudid for chronic back pain. Her typical dose is 6 mg. She thought she took 3 2 mg, but actually ingested 3 4 mg tablets.     Pt AOX4. VSS.      Triage Assessment (Adult)       Row Name 06/22/25 4479          Triage Assessment    Airway WDL WDL        Respiratory WDL    Respiratory WDL WDL        Skin Circulation/Temperature WDL    Skin Circulation/Temperature WDL WDL        Cardiac WDL    Cardiac WDL X     Cardiac Rhythm ST        Peripheral/Neurovascular WDL    Peripheral Neurovascular WDL WDL        Cognitive/Neuro/Behavioral WDL    Cognitive/Neuro/Behavioral WDL WDL

## 2025-06-23 ENCOUNTER — TELEPHONE (OUTPATIENT)
Dept: OPHTHALMOLOGY | Facility: CLINIC | Age: 35
End: 2025-06-23
Payer: COMMERCIAL

## 2025-06-23 ENCOUNTER — HOSPITAL ENCOUNTER (EMERGENCY)
Facility: CLINIC | Age: 35
Discharge: HOME | End: 2025-06-23
Attending: EMERGENCY MEDICINE | Admitting: EMERGENCY MEDICINE
Payer: COMMERCIAL

## 2025-06-23 VITALS
OXYGEN SATURATION: 96 % | WEIGHT: 230 LBS | DIASTOLIC BLOOD PRESSURE: 81 MMHG | BODY MASS INDEX: 42.07 KG/M2 | HEART RATE: 104 BPM | SYSTOLIC BLOOD PRESSURE: 140 MMHG | RESPIRATION RATE: 20 BRPM | TEMPERATURE: 96.9 F

## 2025-06-23 DIAGNOSIS — T50.904A DRUG OVERDOSE OF UNDETERMINED INTENT, INITIAL ENCOUNTER: ICD-10-CM

## 2025-06-23 PROCEDURE — 99283 EMERGENCY DEPT VISIT LOW MDM: CPT

## 2025-06-23 ASSESSMENT — ACTIVITIES OF DAILY LIVING (ADL)
ADLS_ACUITY_SCORE: 62

## 2025-06-23 ASSESSMENT — COLUMBIA-SUICIDE SEVERITY RATING SCALE - C-SSRS
6. HAVE YOU EVER DONE ANYTHING, STARTED TO DO ANYTHING, OR PREPARED TO DO ANYTHING TO END YOUR LIFE?: YES
1. IN THE PAST MONTH, HAVE YOU WISHED YOU WERE DEAD OR WISHED YOU COULD GO TO SLEEP AND NOT WAKE UP?: NO
2. HAVE YOU ACTUALLY HAD ANY THOUGHTS OF KILLING YOURSELF IN THE PAST MONTH?: NO

## 2025-06-23 NOTE — ED PROVIDER NOTES
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 denies that this is a suicidal intent.  She was observed in the emergency department for 4-1/2 hours at this time.  She remains quite tired but does not show signs of hallucinations or other anticholinergic sequela of concern.  At this time she continues to rest that she does  not have transport home.  Anticipate discharge home later this morning once that can be arranged    Disposition   Care of the patient was transferred to my colleague Dr. Cosme pending ride home.    Diagnosis     ICD-10-CM    1. Drug overdose - hydroxyzine, initial encounter  T50.904A          Scribe Disclosure:  I, Karina Cervantes, am serving as a scribe at 1:56 AM on 6/23/2025 to document services personally performed by Siddharth Soler MD based on my observations and the provider's statements to me.        Siddharth Soler MD  06/23/25 0581

## 2025-06-23 NOTE — ED PROVIDER NOTES
I assumed care for this patient after signout from colleague. Plan at time of sign out was to discharge the patient once a ride is identified. Prior to my notification of this, I was informed by nursing staff that the patient had left the emergency department.    Kamar Cosme MD on 6/23/2025 at 9:14 PM       Kamar Cosme MD  06/23/25 5227

## 2025-06-23 NOTE — ED NOTES
"After initial triage, patient reports to this RN that she took more of her anxiety medication hydroxizine than initial report of regular pm dose.  Doesn't know exact amount but states \"less than 10\" tablets of 25mg strength.  MD ELGIN Soler updated.   "

## 2025-06-23 NOTE — DISCHARGE INSTRUCTIONS
You were seen in the emergency department after accidentally taking more Dilaudid than you had intended.  We monitored you here in the emergency department and your exam and workup was overall reassuring.  It is okay to take anxiety medication later if you do feel like you need it.    Please call your primary care doctor to let them know you are seen in the ER.    Come back to the emergency room if you have any thoughts of hurting yourself, if you have severe shortness of breath, or any other concerning symptoms.

## 2025-06-23 NOTE — ED TRIAGE NOTES
"Patient comes to ED via EMS for evaluation of dizziness/shakiness and chest heaviness.  Patient just discharged around 2100 after workup for overdose.  Patient returns with above symptoms, also feels like \"I am floating, like I could passout\"  Patient reports taking her night time medications but no other substances.  Alert and oriented to baseline during triage.      Triage Assessment (Adult)       Row Name 06/23/25 0127          Triage Assessment    Airway WDL WDL        Respiratory WDL    Respiratory WDL WDL        Skin Circulation/Temperature WDL    Skin Circulation/Temperature WDL WDL        Cardiac WDL    Cardiac WDL X;rhythm     Pulse Rate & Regularity tachycardic        Peripheral/Neurovascular WDL    Peripheral Neurovascular WDL WDL        Cognitive/Neuro/Behavioral WDL    Cognitive/Neuro/Behavioral WDL WDL                     "

## 2025-06-23 NOTE — ED NOTES
Bed: VISHAL  Expected date: 6/23/25  Expected time: 1:11 AM  Means of arrival:   Comments:  [id number]

## 2025-06-23 NOTE — ED NOTES
Attempted to check on patient to but pt not in room. Per former RN, pt was medically cleared and ready for discharge but upset that she was not provided a paid ride home.   8:21 AM  Pt still not in room. Pt left ED prior to discharge instructions and repeat VS.   0900 Informed charge RN and MD of pt's departure. No further instructions.

## 2025-06-24 ENCOUNTER — MYC MEDICAL ADVICE (OUTPATIENT)
Dept: INTERVENTIONAL RADIOLOGY/VASCULAR | Facility: CLINIC | Age: 35
End: 2025-06-24
Payer: COMMERCIAL

## 2025-06-24 ENCOUNTER — TELEPHONE (OUTPATIENT)
Dept: OPHTHALMOLOGY | Facility: CLINIC | Age: 35
End: 2025-06-24
Payer: COMMERCIAL

## 2025-06-24 ENCOUNTER — TRANSFERRED RECORDS (OUTPATIENT)
Dept: HEALTH INFORMATION MANAGEMENT | Facility: CLINIC | Age: 35
End: 2025-06-24
Payer: COMMERCIAL

## 2025-06-25 ENCOUNTER — TELEPHONE (OUTPATIENT)
Dept: PHARMACY | Facility: OTHER | Age: 35
End: 2025-06-25
Payer: COMMERCIAL

## 2025-06-25 NOTE — TELEPHONE ENCOUNTER
Nevin updated on IR recommendations for painful PORT.      I have informed Nevin that after review of patient status and imaging, IR will offer a PORT removal if patient wishes to proceed.     IR will not replace PORT after removal.      Alternative access options would have to be pursued by patient and referring team.    Nevin frustrated and angry with recommendation.  She states that she does not want removal of PORT as it is needed for her treatment plan.    Therapeutic listening offered.  Nevin states that she has a primary physician appointment tomorrow and I urged her to discuss options with provider at that time.    Unfortunately phone connection issues plagued this call at end of conversation and connection was lost.    Toya ROSE  Interventional Radiology RN

## 2025-06-25 NOTE — TELEPHONE ENCOUNTER
MTM referral from: Transitions of Care (recent hospital discharge, TCU discharge, or ED visit)    MTM referral outreach attempt #2 on June 25, 2025 at 12:00 PM      Outcome: Spoke with patient declined    Use medica part d MAP for the carrier/Plan on the flowsheet    Kathie Marsh CMA  MTM

## 2025-06-26 ENCOUNTER — VIRTUAL VISIT (OUTPATIENT)
Dept: ENDOCRINOLOGY | Facility: CLINIC | Age: 35
End: 2025-06-26
Payer: COMMERCIAL

## 2025-06-26 ENCOUNTER — APPOINTMENT (OUTPATIENT)
Dept: GENERAL RADIOLOGY | Facility: CLINIC | Age: 35
End: 2025-06-26
Attending: EMERGENCY MEDICINE
Payer: COMMERCIAL

## 2025-06-26 ENCOUNTER — HOSPITAL ENCOUNTER (EMERGENCY)
Facility: CLINIC | Age: 35
Discharge: HOME | End: 2025-06-26
Attending: STUDENT IN AN ORGANIZED HEALTH CARE EDUCATION/TRAINING PROGRAM | Admitting: STUDENT IN AN ORGANIZED HEALTH CARE EDUCATION/TRAINING PROGRAM
Payer: COMMERCIAL

## 2025-06-26 VITALS — WEIGHT: 231.8 LBS | HEIGHT: 62 IN | BODY MASS INDEX: 42.65 KG/M2

## 2025-06-26 VITALS
SYSTOLIC BLOOD PRESSURE: 143 MMHG | OXYGEN SATURATION: 98 % | HEART RATE: 90 BPM | RESPIRATION RATE: 18 BRPM | TEMPERATURE: 99.4 F | DIASTOLIC BLOOD PRESSURE: 93 MMHG

## 2025-06-26 DIAGNOSIS — T50.901A OVERDOSE, ACCIDENTAL OR UNINTENTIONAL, INITIAL ENCOUNTER: ICD-10-CM

## 2025-06-26 DIAGNOSIS — E11.9 TYPE 2 DIABETES MELLITUS WITHOUT COMPLICATION, WITHOUT LONG-TERM CURRENT USE OF INSULIN (H): ICD-10-CM

## 2025-06-26 DIAGNOSIS — E66.813 CLASS 3 SEVERE OBESITY WITH SERIOUS COMORBIDITY AND BODY MASS INDEX (BMI) OF 50.0 TO 59.9 IN ADULT, UNSPECIFIED OBESITY TYPE (H): Primary | ICD-10-CM

## 2025-06-26 PROBLEM — F41.9 ANXIETY DISORDER: Status: ACTIVE | Noted: 2019-01-13

## 2025-06-26 LAB
ALBUMIN SERPL BCG-MCNC: 3.8 G/DL (ref 3.5–5.2)
ALP SERPL-CCNC: 56 U/L (ref 40–150)
ALT SERPL W P-5'-P-CCNC: 33 U/L (ref 0–50)
ANION GAP SERPL CALCULATED.3IONS-SCNC: 11 MMOL/L (ref 7–15)
AST SERPL W P-5'-P-CCNC: 28 U/L (ref 0–45)
BASOPHILS # BLD AUTO: 0 10E3/UL (ref 0–0.2)
BASOPHILS NFR BLD AUTO: 1 %
BILIRUB SERPL-MCNC: 0.2 MG/DL
BUN SERPL-MCNC: 14.6 MG/DL (ref 6–20)
CALCIUM SERPL-MCNC: 8.9 MG/DL (ref 8.8–10.4)
CHLORIDE SERPL-SCNC: 105 MMOL/L (ref 98–107)
CREAT SERPL-MCNC: 0.55 MG/DL (ref 0.51–0.95)
EGFRCR SERPLBLD CKD-EPI 2021: >90 ML/MIN/1.73M2
EOSINOPHIL # BLD AUTO: 0.1 10E3/UL (ref 0–0.7)
EOSINOPHIL NFR BLD AUTO: 1 %
ERYTHROCYTE [DISTWIDTH] IN BLOOD BY AUTOMATED COUNT: 14.1 % (ref 10–15)
GLUCOSE SERPL-MCNC: 108 MG/DL (ref 70–99)
HCO3 SERPL-SCNC: 24 MMOL/L (ref 22–29)
HCT VFR BLD AUTO: 41.9 % (ref 35–47)
HGB BLD-MCNC: 13.9 G/DL (ref 11.7–15.7)
IMM GRANULOCYTES # BLD: 0 10E3/UL
IMM GRANULOCYTES NFR BLD: 0 %
LYMPHOCYTES # BLD AUTO: 1.8 10E3/UL (ref 0.8–5.3)
LYMPHOCYTES NFR BLD AUTO: 21 %
MCH RBC QN AUTO: 30.2 PG (ref 26.5–33)
MCHC RBC AUTO-ENTMCNC: 33.2 G/DL (ref 31.5–36.5)
MCV RBC AUTO: 91 FL (ref 78–100)
MONOCYTES # BLD AUTO: 0.6 10E3/UL (ref 0–1.3)
MONOCYTES NFR BLD AUTO: 7 %
NEUTROPHILS # BLD AUTO: 6 10E3/UL (ref 1.6–8.3)
NEUTROPHILS NFR BLD AUTO: 71 %
NRBC # BLD AUTO: 0 10E3/UL
NRBC BLD AUTO-RTO: 0 /100
PLATELET # BLD AUTO: 203 10E3/UL (ref 150–450)
POTASSIUM SERPL-SCNC: 4 MMOL/L (ref 3.4–5.3)
PROT SERPL-MCNC: 7.5 G/DL (ref 6.4–8.3)
RBC # BLD AUTO: 4.6 10E6/UL (ref 3.8–5.2)
SODIUM SERPL-SCNC: 140 MMOL/L (ref 135–145)
WBC # BLD AUTO: 8.5 10E3/UL (ref 4–11)

## 2025-06-26 PROCEDURE — 84155 ASSAY OF PROTEIN SERUM: CPT | Performed by: STUDENT IN AN ORGANIZED HEALTH CARE EDUCATION/TRAINING PROGRAM

## 2025-06-26 PROCEDURE — 85025 COMPLETE CBC W/AUTO DIFF WBC: CPT | Performed by: STUDENT IN AN ORGANIZED HEALTH CARE EDUCATION/TRAINING PROGRAM

## 2025-06-26 PROCEDURE — 85004 AUTOMATED DIFF WBC COUNT: CPT | Performed by: EMERGENCY MEDICINE

## 2025-06-26 PROCEDURE — 99285 EMERGENCY DEPT VISIT HI MDM: CPT | Mod: 25

## 2025-06-26 PROCEDURE — 82040 ASSAY OF SERUM ALBUMIN: CPT | Performed by: EMERGENCY MEDICINE

## 2025-06-26 PROCEDURE — 71045 X-RAY EXAM CHEST 1 VIEW: CPT

## 2025-06-26 PROCEDURE — 250N000011 HC RX IP 250 OP 636: Performed by: STUDENT IN AN ORGANIZED HEALTH CARE EDUCATION/TRAINING PROGRAM

## 2025-06-26 PROCEDURE — 93005 ELECTROCARDIOGRAM TRACING: CPT

## 2025-06-26 PROCEDURE — 250N000013 HC RX MED GY IP 250 OP 250 PS 637: Performed by: STUDENT IN AN ORGANIZED HEALTH CARE EDUCATION/TRAINING PROGRAM

## 2025-06-26 PROCEDURE — 36415 COLL VENOUS BLD VENIPUNCTURE: CPT | Performed by: EMERGENCY MEDICINE

## 2025-06-26 RX ORDER — HEPARIN SODIUM (PORCINE) LOCK FLUSH IV SOLN 100 UNIT/ML 100 UNIT/ML
100 SOLUTION INTRAVENOUS ONCE
Status: COMPLETED | OUTPATIENT
Start: 2025-06-26 | End: 2025-06-26

## 2025-06-26 RX ADMIN — Medication 100 UNITS: at 21:36

## 2025-06-26 RX ADMIN — NALOXONE HYDROCHLORIDE NASAL 4 MG: 4 SPRAY NASAL at 19:14

## 2025-06-26 ASSESSMENT — ACTIVITIES OF DAILY LIVING (ADL)
ADLS_ACUITY_SCORE: 62

## 2025-06-26 ASSESSMENT — COLUMBIA-SUICIDE SEVERITY RATING SCALE - C-SSRS: IS THE PATIENT NOT ABLE TO COMPLETE C-SSRS: UNRESPONSIVE

## 2025-06-26 NOTE — LETTER
2025       RE: Nevin Alvarado  [address]  [facility] [address]     Dear Colleague,    Thank you for referring your patient, Nevin Alvarado, to the [facility] [place] at [facility]. Please see a copy of my visit note below.      Return Medical Weight Management Note     Nevin Alvarado  MRN:  [id number]  :  1990  MOR:  2025    Dear Latonya Knight MD,    I had the pleasure of seeing your patient Nevin Alvarado. She is a 34 year old female who I am continuing to see for treatment of obesity related to:        2023    12:48 PM   --   I have the following health issues associated with obesity Type II Diabetes    High Blood Pressure    Sleep Apnea    Polycystic Ovarian Syndrome    GERD (Reflux)    Fatty Liver    Asthma    Stress Incontinence       Assessment & Plan  Problem List Items Addressed This Visit          Endocrine    Class 3 severe obesity with serious comorbidity and body mass index (BMI) of 50.0 to 59.9 in adult, unspecified obesity type (H) - Primary    Off zepbound x 4 weeks now due to hospitalizations and severe constipation. Constipation not better despite being off zepbound and doing bowel clean out. At home now and feeling she can't pass stool. Followed by [facility]. Discussed continuing off zepbound for a few more weeks to rule out glp1 as contributing factor in constipation. Takes 4 weeks to completely get out of system.     Significant increase in hunger/ cravings/ snacking since being off zepbound. Snacking multiple times a day between meals. Larger portions to be satisfied. Concerned about weight regain. Would benefit from restarting zepbound or wegovy. Discussed strategies to manage intake while off glp1.     Can talk to GI about linzess   Try food journal   Restart zepbound 2.5mg when done with surgery after    Follow up 2 months          Type  2 diabetes mellitus without complication, without long-term current use of insulin (H)          INTERVAL HISTORY:    NBS 9/2023 BMI 56.5 with DMII, ZOHRA, GERD, and hx of PE. Mental health struggles contributing to weight gain including weight gain associated with medications. This was complicated by numerous episodes of swallowing non food items which has lead to perforations of the esophagus and strictures for which she is followed by GI. Had been stable for several months prior to a relapse just before our consult. We discussed mwm to start given increased risks after bariatric surgery to the stomach. Initially switched rybelsus for DMII to wegovy for better efficacy. Due to insurance change she is now taking ozempic.      Has been having dental issues that are not healing well. Had surgery 1 week ago.   Rolled ankle and broke ankle 4 weeks ago      Was considering hysterectomy/ exp lap for endometriosis in Feb 2025 but then that OB left the system    Last seen 2/2025- switched to mounjaro 5mg, discussed working with MTM, living on own and experiencing some food insecurity      4/2025- referred to gen surg for endometriosis and adhesions, ruling iout premature ovarian failure and early menopause. Takes oral birth control daily without placebo weeks      5/2025- ED visit for RLQ pain, CT/ lab work up negative for bowel obstruction, renal calculi, or other cause for pain. Didn't feel constipated, takes colace daily. Also followed by [facility] - last seen today at [facility] for hemorrhoid symptom - now will see colorectal surgeon for a thrombosed hemorrhoid.      Intermittent nausea, burping- not better when off tirzepatide. EGD yesterday - biopsies pending (inflammation noted).      Last seen 5/28/2025   Has not been on it consistently for several weeks. When taking consistently in April- had increased hunger and cravings - plan to restart 5mg mounjaro and finish box (3pen) then increase to 7.5mg if tolerated      6/2025-  hospitalized for 1.5weeks -gait issues, neuropathy etc.   6/22- ED for unintentional drug over dose (dilaudid and hydroxizine)  6/23- over dose on hydroxizine due to high anxiety     Was off glp1 for 4 weeks. In hospital, she was really backed up and needed to have clean out.     Anti-obesity medication history    Current:   none    Past/Failed/contraindicated:   topiramate- took in 2014 for mood- caused anorexia   Naltrexone - suicidal thoughts   Wegovy- stopped due to weight plateau, constipation     Recent diet changes:   More hunger/ cravings off the mounjaro - 6 to 10 meals a day   Lots of snacking since being off the mounjaro   Doing [employer] protein drinks  Some protein bars     Vitamins/Labs: 6/2025     Chronic constipation- miralax and colace now, cleaned out inpatient     CURRENT WEIGHT:   231 lbs 12.8 oz    Initial Weight (lbs): 309 lbs  Last Visits Weight: 104.3 kg (230 lb)  Cumulative weight loss (lbs): 77.2  Weight Loss Percentage: 24.98%        6/22/2025    10:25 PM   Changes and Difficulties   I have made the following changes to my diet since my last visit: Eating more   With regards to my diet, I am still struggling with: Eating more   I have made the following changes to my activity/exercise since my last visit: Less movement due to neurological issues   With regards to my activity/exercise, I am still struggling with: Pain         MEDICATIONS:   Current Outpatient Medications   Medication Sig Dispense Refill     albuterol (PROVENTIL) (2.5 MG/3ML) 0.083% neb solution Take 1 vial (2.5 mg) by nebulization every 6 hours as needed for shortness of breath or wheezing 90 mL 0     amitriptyline (ELAVIL) 75 MG tablet Take 1 tablet (75 mg) by mouth at bedtime. 60 tablet 0     ammonium lactate (AMLACTIN) 12 % external cream Apply topically 2 times daily. Apply to feet       apixaban ANTICOAGULANT (ELIQUIS) 5 MG tablet Take 5 mg by mouth 2 times daily.       busPIRone (BUSPAR) 15 MG tablet Take 15 mg by  mouth 2 times daily.       cetirizine (ZYRTEC) 10 MG tablet Take 10 mg by mouth 2 times daily.       cholecalciferol 50 MCG (2000 UT) CAPS Take 50 mcg by mouth daily.       clonazePAM (KLONOPIN) 0.5 MG tablet Take 1 tablet (0.5 mg) by mouth daily as needed for anxiety 7 tablet 0     cyanocobalamin (VITAMIN B-12) 1000 MCG tablet Take 1,000 mcg by mouth daily.       cyclobenzaprine (FLEXERIL) 5 MG tablet Take 5 mg by mouth 3 times daily as needed for muscle spasms.       docusate sodium (COLACE) 100 MG capsule Take 100 mg by mouth daily.       ferrous sulfate (FEROSUL) 325 (65 Fe) MG tablet Take 1 tablet (325 mg) by mouth daily (with breakfast) 30 tablet 0     hydrocortisone, Perianal, (ANUSOL-HC) 2.5 % cream Place rectally 2 times daily as needed for hemorrhoids.       hydroxychloroquine (PLAQUENIL) 200 MG tablet Take 200 mg by mouth 2 times daily.       hydrOXYzine HCl (ATARAX) 25 MG tablet Take 25 mg by mouth every 8 hours as needed for anxiety.       lidocaine, Anorectal, 5 % CREA Externally apply topically daily as needed.       metoclopramide (REGLAN) 5 MG tablet Take 5 mg by mouth 4 times daily as needed for vomiting.       norethindrone (AYGESTIN) 5 MG tablet Take 5 mg by mouth daily       nystatin (MYCOSTATIN) 681905 UNIT/GM external cream Apply topically 2 times daily as needed for dry skin. 30 g 1     ondansetron (ZOFRAN ODT) 4 MG ODT tab Take 4 mg by mouth every 8 hours as needed for nausea.       pantoprazole (PROTONIX) 40 MG EC tablet Take 40 mg by mouth daily.       polyethylene glycol (MIRALAX) 17 GM/Dose powder Take 17 g by mouth daily as needed for constipation       pregabalin (LYRICA) 100 MG capsule Take 1 capsule (100 mg) by mouth daily (with lunch). 60 capsule 0     pregabalin (LYRICA) 100 MG capsule Take 200 mg by mouth every evening.       pregabalin (LYRICA) 100 MG capsule Take 100 mg by mouth every morning. In addition to evening dose       Prenatal Vit-Fe Fumarate-FA (PRENATAL MULTIVITAMIN  W/IRON) 27-0.8 MG tablet Take 1 tablet by mouth daily. 90 tablet 3     sennosides (SENOKOT) 8.6 MG tablet Take 1 tablet by mouth 2 times daily as needed for constipation.       Sodium Phosphates (FLEET ENEMA) ENEM Place 1 enema rectally as needed (constipation).             6/22/2025    10:25 PM   Weight Loss Medication History Reviewed With Patient   Which weight loss medications are you currently taking on a regular basis? None   If you are not taking a weight loss medication that was prescribed to you, please indicate why: Other   Are you having any side effects from the weight loss medication that we have prescribed you? Yes   If you are having side effects please describe: Severe constipation       Admission on 06/20/2025, Discharged on 06/21/2025   Component Date Value Ref Range Status     Ventricular Rate 06/20/2025 107  BPM Final     Atrial Rate 06/20/2025 107  BPM Final     ID Interval 06/20/2025 132  ms Final     QRS Duration 06/20/2025 68  ms Final     QT 06/20/2025 304  ms Final     QTc 06/20/2025 405  ms Final     P Axis 06/20/2025 38  degrees Final     R AXIS 06/20/2025 70  degrees Final     T Axis 06/20/2025 24  degrees Final     Interpretation ECG 06/20/2025    Final                    Value:Sinus tachycardia  Otherwise normal ECG  When compared with ECG of 10-Kaden-2025 11:33,  No significant change was found  Confirmed by GENERATED REPORT, COMPUTER ([id number]),  Damian Masters ([id number]) on 6/22/2025 3:34:55 PM       Sodium 06/20/2025 137  135 - 145 mmol/L Final     Potassium 06/20/2025 3.8  3.4 - 5.3 mmol/L Final     Chloride 06/20/2025 100  98 - 107 mmol/L Final     Carbon Dioxide (CO2) 06/20/2025 25  22 - 29 mmol/L Final     Anion Gap 06/20/2025 12  7 - 15 mmol/L Final     Urea Nitrogen 06/20/2025 17.4  6.0 - 20.0 mg/dL Final     Creatinine 06/20/2025 0.55  0.51 - 0.95 mg/dL Final     GFR Estimate 06/20/2025 >90  >60 mL/min/1.73m2 Final    eGFR calculated using 2021 CKD-EPI equation.     Calcium  "06/20/2025 8.8  8.8 - 10.4 mg/dL Final     Glucose 06/20/2025 106 (H)  70 - 99 mg/dL Final     WBC Count 06/20/2025 12.2 (H)  4.0 - 11.0 10e3/uL Final     RBC Count 06/20/2025 4.21  3.80 - 5.20 10e6/uL Final     Hemoglobin 06/20/2025 12.7  11.7 - 15.7 g/dL Final     Hematocrit 06/20/2025 38.7  35.0 - 47.0 % Final     MCV 06/20/2025 92  78 - 100 fL Final     MCH 06/20/2025 30.2  26.5 - 33.0 pg Final     MCHC 06/20/2025 32.8  31.5 - 36.5 g/dL Final     RDW 06/20/2025 14.6  10.0 - 15.0 % Final     Platelet Count 06/20/2025 226  150 - 450 10e3/uL Final     % Neutrophils 06/20/2025 61  % Final     % Lymphocytes 06/20/2025 31  % Final     % Monocytes 06/20/2025 6  % Final     % Eosinophils 06/20/2025 1  % Final     % Basophils 06/20/2025 0  % Final     % Immature Granulocytes 06/20/2025 1  % Final     NRBCs per 100 WBC 06/20/2025 0  <1 /100 Final     Absolute Neutrophils 06/20/2025 7.5  1.6 - 8.3 10e3/uL Final     Absolute Lymphocytes 06/20/2025 3.8  0.8 - 5.3 10e3/uL Final     Absolute Monocytes 06/20/2025 0.8  0.0 - 1.3 10e3/uL Final     Absolute Eosinophils 06/20/2025 0.1  0.0 - 0.7 10e3/uL Final     Absolute Basophils 06/20/2025 0.1  0.0 - 0.2 10e3/uL Final     Absolute Immature Granulocytes 06/20/2025 0.1  <=0.4 10e3/uL Final     Absolute NRBCs 06/20/2025 0.0  10e3/uL Final           9/13/2023    10:26 AM   BRIAN Score (Last Two)   BRIAN Raw Score 37   Activation Score 79.2   BRIAN Level 4         PHYSICAL EXAM:  Objective   Ht 1.575 m (5' 2.01\")   Wt 105.1 kg (231 lb 12.8 oz)   LMP  (LMP Unknown)   BMI 42.39 kg/m             Vitals:  No vitals were obtained today due to virtual visit.    GENERAL: alert and no distress  EYES: Eyes grossly normal to inspection.  No discharge or erythema, or obvious scleral/conjunctival abnormalities.  RESP: No audible wheeze, cough, or visible cyanosis.    SKIN: Visible skin clear. No significant rash, abnormal pigmentation or lesions.  NEURO: Cranial nerves grossly intact.  Mentation " and speech appropriate for age.  PSYCH: Appropriate affect, tone, and pace of words        Sincerely,    HU Walden CNP      30 minutes spent by me on the date of the encounter doing chart review, history and exam, documentation and further activities per the note    The longitudinal plan of care for the diagnosis(es)/condition(s) as documented were addressed during this visit. Due to the added complexity in care, I will continue to support Nevin in the subsequent management and with ongoing continuity of care.    Virtual Visit Details    Type of service:  Video Visit   Video Start Time: 1403  Video End Time:1430    Originating Location (pt. Location): Home    Distant Location (provider location):  Off-site  Platform used for Video Visit: Thuy          Again, thank you for allowing me to participate in the care of your patient.      Sincerely,    HU Walden CNP

## 2025-06-26 NOTE — NURSING NOTE
Current patient location: [address] [place] [address]   [facility] [address]    Is the patient currently in the state of [place]? YES    Visit mode:VIDEO    If the visit is dropped, the patient can be reconnected by: sergio    Will anyone else be joining the visit? NO  (If patient encounters technical issues they should call [phone])    Are changes needed to the allergy or medication list? Pt stated no changes to allergies and Pt stated no med changes    Are refills needed on medications prescribed by this physician? NO    Reason for visit: RECHECK    Janie ANTONIO

## 2025-06-26 NOTE — ASSESSMENT & PLAN NOTE
Off zepbound x 4 weeks now due to hospitalizations and severe constipation. Constipation not better despite being off zepbound and doing bowel clean out. At home now and feeling she can't pass stool. Followed by [facility]. Discussed continuing off zepbound for a few more weeks to rule out glp1 as contributing factor in constipation. Takes 4 weeks to completely get out of system.     Significant increase in hunger/ cravings/ snacking since being off zepbound. Snacking multiple times a day between meals. Larger portions to be satisfied. Concerned about weight regain. Would benefit from restarting zepbound or wegovy. Discussed strategies to manage intake while off glp1.     Can talk to GI about linzess   Try food journal   Restart zepbound 2.5mg when done with surgery after July 14th   Follow up 2 months

## 2025-06-26 NOTE — PROGRESS NOTES
Return Medical Weight Management Note     Nevin Alvarado  MRN:  [id number]  :  1990  OMR:  2025    Dear Latonya Knight MD,    I had the pleasure of seeing your patient Nevin Alvarado. She is a 34 year old female who I am continuing to see for treatment of obesity related to:        2023    12:48 PM   --   I have the following health issues associated with obesity Type II Diabetes    High Blood Pressure    Sleep Apnea    Polycystic Ovarian Syndrome    GERD (Reflux)    Fatty Liver    Asthma    Stress Incontinence       Assessment & Plan   Problem List Items Addressed This Visit          Endocrine    Class 3 severe obesity with serious comorbidity and body mass index (BMI) of 50.0 to 59.9 in adult, unspecified obesity type (H) - Primary    Off zepbound x 4 weeks now due to hospitalizations and severe constipation. Constipation not better despite being off zepbound and doing bowel clean out. At home now and feeling she can't pass stool. Followed by [facility]. Discussed continuing off zepbound for a few more weeks to rule out glp1 as contributing factor in constipation. Takes 4 weeks to completely get out of system.     Significant increase in hunger/ cravings/ snacking since being off zepbound. Snacking multiple times a day between meals. Larger portions to be satisfied. Concerned about weight regain. Would benefit from restarting zepbound or wegovy. Discussed strategies to manage intake while off glp1.     Can talk to GI about linzess   Try food journal   Restart zepbound 2.5mg when done with surgery after    Follow up 2 months          Type 2 diabetes mellitus without complication, without long-term current use of insulin (H)          INTERVAL HISTORY:    NBS 2023 BMI 56.5 with DMII, ZOHRA, GERD, and hx of PE. Mental health struggles contributing to weight gain including weight gain associated with medications. This was complicated by numerous episodes of swallowing non food  items which has lead to perforations of the esophagus and strictures for which she is followed by GI. Had been stable for several months prior to a relapse just before our consult. We discussed mwm to start given increased risks after bariatric surgery to the stomach. Initially switched rybelsus for DMII to wegovy for better efficacy. Due to insurance change she is now taking ozempic.      Has been having dental issues that are not healing well. Had surgery 1 week ago.   Rolled ankle and broke ankle 4 weeks ago      Was considering hysterectomy/ exp lap for endometriosis in Feb 2025 but then that OB left the system    Last seen 2/2025- switched to mounjaro 5mg, discussed working with MTM, living on own and experiencing some food insecurity      4/2025- referred to gen surg for endometriosis and adhesions, ruling iout premature ovarian failure and early menopause. Takes oral birth control daily without placebo weeks      5/2025- ED visit for RLQ pain, CT/ lab work up negative for bowel obstruction, renal calculi, or other cause for pain. Didn't feel constipated, takes colace daily. Also followed by [facility] - last seen today at [facility] for hemorrhoid symptom - now will see colorectal surgeon for a thrombosed hemorrhoid.      Intermittent nausea, burping- not better when off tirzepatide. EGD yesterday - biopsies pending (inflammation noted).      Last seen 5/28/2025   Has not been on it consistently for several weeks. When taking consistently in April- had increased hunger and cravings - plan to restart 5mg mounjaro and finish box (3pen) then increase to 7.5mg if tolerated      6/2025- hospitalized for 1.5weeks -gait issues, neuropathy etc.   6/22- ED for unintentional drug over dose (dilaudid and hydroxizine)  6/23- over dose on hydroxizine due to high anxiety     Was off glp1 for 4 weeks. In hospital, she was really backed up and needed to have clean out.     Anti-obesity medication history    Current:    none    Past/Failed/contraindicated:   topiramate- took in 2014 for mood- caused anorexia   Naltrexone - suicidal thoughts   Wegovy- stopped due to weight plateau, constipation     Recent diet changes:   More hunger/ cravings off the mounjaro - 6 to 10 meals a day   Lots of snacking since being off the mounjaro   Doing fairlife protein drinks  Some protein bars     Vitamins/Labs: 6/2025     Chronic constipation- miralax and colace now, cleaned out inpatient     CURRENT WEIGHT:   231 lbs 12.8 oz    Initial Weight (lbs): 309 lbs  Last Visits Weight: 104.3 kg (230 lb)  Cumulative weight loss (lbs): 77.2  Weight Loss Percentage: 24.98%        6/22/2025    10:25 PM   Changes and Difficulties   I have made the following changes to my diet since my last visit: Eating more   With regards to my diet, I am still struggling with: Eating more   I have made the following changes to my activity/exercise since my last visit: Less movement due to neurological issues   With regards to my activity/exercise, I am still struggling with: Pain         MEDICATIONS:   Current Outpatient Medications   Medication Sig Dispense Refill    albuterol (PROVENTIL) (2.5 MG/3ML) 0.083% neb solution Take 1 vial (2.5 mg) by nebulization every 6 hours as needed for shortness of breath or wheezing 90 mL 0    amitriptyline (ELAVIL) 75 MG tablet Take 1 tablet (75 mg) by mouth at bedtime. 60 tablet 0    ammonium lactate (AMLACTIN) 12 % external cream Apply topically 2 times daily. Apply to feet      apixaban ANTICOAGULANT (ELIQUIS) 5 MG tablet Take 5 mg by mouth 2 times daily.      busPIRone (BUSPAR) 15 MG tablet Take 15 mg by mouth 2 times daily.      cetirizine (ZYRTEC) 10 MG tablet Take 10 mg by mouth 2 times daily.      cholecalciferol 50 MCG (2000 UT) CAPS Take 50 mcg by mouth daily.      clonazePAM (KLONOPIN) 0.5 MG tablet Take 1 tablet (0.5 mg) by mouth daily as needed for anxiety 7 tablet 0    cyanocobalamin (VITAMIN B-12) 1000 MCG tablet Take  1,000 mcg by mouth daily.      cyclobenzaprine (FLEXERIL) 5 MG tablet Take 5 mg by mouth 3 times daily as needed for muscle spasms.      docusate sodium (COLACE) 100 MG capsule Take 100 mg by mouth daily.      ferrous sulfate (FEROSUL) 325 (65 Fe) MG tablet Take 1 tablet (325 mg) by mouth daily (with breakfast) 30 tablet 0    hydrocortisone, Perianal, (ANUSOL-HC) 2.5 % cream Place rectally 2 times daily as needed for hemorrhoids.      hydroxychloroquine (PLAQUENIL) 200 MG tablet Take 200 mg by mouth 2 times daily.      hydrOXYzine HCl (ATARAX) 25 MG tablet Take 25 mg by mouth every 8 hours as needed for anxiety.      lidocaine, Anorectal, 5 % CREA Externally apply topically daily as needed.      metoclopramide (REGLAN) 5 MG tablet Take 5 mg by mouth 4 times daily as needed for vomiting.      norethindrone (AYGESTIN) 5 MG tablet Take 5 mg by mouth daily      nystatin (MYCOSTATIN) 297485 UNIT/GM external cream Apply topically 2 times daily as needed for dry skin. 30 g 1    ondansetron (ZOFRAN ODT) 4 MG ODT tab Take 4 mg by mouth every 8 hours as needed for nausea.      pantoprazole (PROTONIX) 40 MG EC tablet Take 40 mg by mouth daily.      polyethylene glycol (MIRALAX) 17 GM/Dose powder Take 17 g by mouth daily as needed for constipation      pregabalin (LYRICA) 100 MG capsule Take 1 capsule (100 mg) by mouth daily (with lunch). 60 capsule 0    pregabalin (LYRICA) 100 MG capsule Take 200 mg by mouth every evening.      pregabalin (LYRICA) 100 MG capsule Take 100 mg by mouth every morning. In addition to evening dose      Prenatal Vit-Fe Fumarate-FA (PRENATAL MULTIVITAMIN W/IRON) 27-0.8 MG tablet Take 1 tablet by mouth daily. 90 tablet 3    sennosides (SENOKOT) 8.6 MG tablet Take 1 tablet by mouth 2 times daily as needed for constipation.      Sodium Phosphates (FLEET ENEMA) ENEM Place 1 enema rectally as needed (constipation).             6/22/2025    10:25 PM   Weight Loss Medication History Reviewed With Patient    Which weight loss medications are you currently taking on a regular basis? None   If you are not taking a weight loss medication that was prescribed to you, please indicate why: Other   Are you having any side effects from the weight loss medication that we have prescribed you? Yes   If you are having side effects please describe: Severe constipation       Admission on 06/20/2025, Discharged on 06/21/2025   Component Date Value Ref Range Status    Ventricular Rate 06/20/2025 107  BPM Final    Atrial Rate 06/20/2025 107  BPM Final    VA Interval 06/20/2025 132  ms Final    QRS Duration 06/20/2025 68  ms Final    QT 06/20/2025 304  ms Final    QTc 06/20/2025 405  ms Final    P Axis 06/20/2025 38  degrees Final    R AXIS 06/20/2025 70  degrees Final    T Axis 06/20/2025 24  degrees Final    Interpretation ECG 06/20/2025    Final                    Value:Sinus tachycardia  Otherwise normal ECG  When compared with ECG of 10-Kaden-2025 11:33,  No significant change was found  Confirmed by GENERATED REPORT, COMPUTER ([id number]),  Damian Masters ([id number]) on 6/22/2025 3:34:55 PM      Sodium 06/20/2025 137  135 - 145 mmol/L Final    Potassium 06/20/2025 3.8  3.4 - 5.3 mmol/L Final    Chloride 06/20/2025 100  98 - 107 mmol/L Final    Carbon Dioxide (CO2) 06/20/2025 25  22 - 29 mmol/L Final    Anion Gap 06/20/2025 12  7 - 15 mmol/L Final    Urea Nitrogen 06/20/2025 17.4  6.0 - 20.0 mg/dL Final    Creatinine 06/20/2025 0.55  0.51 - 0.95 mg/dL Final    GFR Estimate 06/20/2025 >90  >60 mL/min/1.73m2 Final    eGFR calculated using 2021 CKD-EPI equation.    Calcium 06/20/2025 8.8  8.8 - 10.4 mg/dL Final    Glucose 06/20/2025 106 (H)  70 - 99 mg/dL Final    WBC Count 06/20/2025 12.2 (H)  4.0 - 11.0 10e3/uL Final    RBC Count 06/20/2025 4.21  3.80 - 5.20 10e6/uL Final    Hemoglobin 06/20/2025 12.7  11.7 - 15.7 g/dL Final    Hematocrit 06/20/2025 38.7  35.0 - 47.0 % Final    MCV 06/20/2025 92  78 - 100 fL Final    MCH 06/20/2025  "30.2  26.5 - 33.0 pg Final    MCHC 06/20/2025 32.8  31.5 - 36.5 g/dL Final    RDW 06/20/2025 14.6  10.0 - 15.0 % Final    Platelet Count 06/20/2025 226  150 - 450 10e3/uL Final    % Neutrophils 06/20/2025 61  % Final    % Lymphocytes 06/20/2025 31  % Final    % Monocytes 06/20/2025 6  % Final    % Eosinophils 06/20/2025 1  % Final    % Basophils 06/20/2025 0  % Final    % Immature Granulocytes 06/20/2025 1  % Final    NRBCs per 100 WBC 06/20/2025 0  <1 /100 Final    Absolute Neutrophils 06/20/2025 7.5  1.6 - 8.3 10e3/uL Final    Absolute Lymphocytes 06/20/2025 3.8  0.8 - 5.3 10e3/uL Final    Absolute Monocytes 06/20/2025 0.8  0.0 - 1.3 10e3/uL Final    Absolute Eosinophils 06/20/2025 0.1  0.0 - 0.7 10e3/uL Final    Absolute Basophils 06/20/2025 0.1  0.0 - 0.2 10e3/uL Final    Absolute Immature Granulocytes 06/20/2025 0.1  <=0.4 10e3/uL Final    Absolute NRBCs 06/20/2025 0.0  10e3/uL Final           9/13/2023    10:26 AM   BRIAN Score (Last Two)   BRIAN Raw Score 37   Activation Score 79.2   BRIAN Level 4         PHYSICAL EXAM:  Objective    Ht 1.575 m (5' 2.01\")   Wt 105.1 kg (231 lb 12.8 oz)   LMP  (LMP Unknown)   BMI 42.39 kg/m             Vitals:  No vitals were obtained today due to virtual visit.    GENERAL: alert and no distress  EYES: Eyes grossly normal to inspection.  No discharge or erythema, or obvious scleral/conjunctival abnormalities.  RESP: No audible wheeze, cough, or visible cyanosis.    SKIN: Visible skin clear. No significant rash, abnormal pigmentation or lesions.  NEURO: Cranial nerves grossly intact.  Mentation and speech appropriate for age.  PSYCH: Appropriate affect, tone, and pace of words        Sincerely,    HU Walden CNP      30 minutes spent by me on the date of the encounter doing chart review, history and exam, documentation and further activities per the note    The longitudinal plan of care for the diagnosis(es)/condition(s) as documented were addressed during this visit. Due to " the added complexity in care, I will continue to support Nevin in the subsequent management and with ongoing continuity of care.

## 2025-06-26 NOTE — PROGRESS NOTES
Virtual Visit Details    Type of service:  Video Visit   Video Start Time: 1403  Video End Time:1430    Originating Location (pt. Location): Home    Distant Location (provider location):  Off-site  Platform used for Video Visit: [employer]

## 2025-06-26 NOTE — ED TRIAGE NOTES
Patient BIBA from home. Patient was experiencing pain and was having anxiety related to the pain. Patient subsequently took 1mg of clonazepam, 100mg of hydroxyzine, and 12mg of dilaudid.      Triage Assessment (Adult)       Row Name 06/26/25 1744          Triage Assessment    Airway WDL WDL        Respiratory WDL    Respiratory WDL WDL        Skin Circulation/Temperature WDL    Skin Circulation/Temperature WDL WDL        Cardiac WDL    Cardiac WDL X  HTN        Peripheral/Neurovascular WDL    Peripheral Neurovascular WDL WDL        Cognitive/Neuro/Behavioral WDL    Cognitive/Neuro/Behavioral WDL WDL;X;level of consciousness     Level of Consciousness lethargic

## 2025-06-26 NOTE — PATIENT INSTRUCTIONS
Can talk to GI about linzess   Try food journal   Restart zepbound 2.5mg when done with surgery after July 14th   Follow up 2 months
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
## 2025-06-27 LAB
ATRIAL RATE - MUSE: 92 BPM
DIASTOLIC BLOOD PRESSURE - MUSE: NORMAL MMHG
INTERPRETATION ECG - MUSE: NORMAL
P AXIS - MUSE: 32 DEGREES
PR INTERVAL - MUSE: 154 MS
QRS DURATION - MUSE: 72 MS
QT - MUSE: 332 MS
QTC - MUSE: 410 MS
R AXIS - MUSE: 68 DEGREES
SYSTOLIC BLOOD PRESSURE - MUSE: NORMAL MMHG
T AXIS - MUSE: 14 DEGREES
VENTRICULAR RATE- MUSE: 92 BPM

## 2025-06-27 NOTE — DISCHARGE INSTRUCTIONS
It is not safe to take more than the prescribed dose of Dilaudid at 1 time.  If you feel yourself getting more tired and you took more than the recommended dose, you can self administer Narcan.  If you do this we recommend that you call 911.  Please follow-up closely with your primary care doctor.       Today you were seen by a licensed mental health professional through Triage and Transition services, [facility])  for a crisis assessment in the Emergency Department at [facility].  It is recommended that you follow up with your established providers (psychiatrist, mental health therapist, and/or primary care doctor - as relevant) as soon as possible. Coordinators from [facility] will be calling you in the next 24-48 hours to ensure that you have the resources you need.  You can also contact [facility] coordinators directly at [phone]. You may have been scheduled for or offered an appointment with a mental health provider. [facility] maintains an extensive network of licensed behavioral health providers to connect patients with the services they need.  We do not charge providers a fee to participate in our referral network.  We match patients with providers based on a patient's specific needs, insurance coverage, and location.  Our first effort will be to refer you to a provider within your care system, and will utilize providers outside your care system as needed.

## 2025-06-27 NOTE — ED NOTES
Patient refusing to allow writer to perform any interventions such as fluids, collect a UA, or to where a vital monitoring. Writer informed the DO. DO is okay with her refusing interventions until she is seen again by the physician.
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
## 2025-06-30 ENCOUNTER — HOSPITAL ENCOUNTER (OUTPATIENT)
Facility: CLINIC | Age: 35
Setting detail: OBSERVATION
Discharge: HOME | End: 2025-07-01
Attending: EMERGENCY MEDICINE | Admitting: INTERNAL MEDICINE
Payer: COMMERCIAL

## 2025-06-30 ENCOUNTER — APPOINTMENT (OUTPATIENT)
Dept: GENERAL RADIOLOGY | Facility: CLINIC | Age: 35
End: 2025-06-30
Attending: EMERGENCY MEDICINE
Payer: COMMERCIAL

## 2025-06-30 DIAGNOSIS — Z78.9 PROBLEM WITH VASCULAR ACCESS: ICD-10-CM

## 2025-06-30 DIAGNOSIS — R07.9 RIGHT-SIDED CHEST PAIN: ICD-10-CM

## 2025-06-30 LAB
ANION GAP SERPL CALCULATED.3IONS-SCNC: 9 MMOL/L (ref 7–15)
BASOPHILS # BLD AUTO: 0 10E3/UL (ref 0–0.2)
BASOPHILS NFR BLD AUTO: 0 %
BUN SERPL-MCNC: 12.4 MG/DL (ref 6–20)
CALCIUM SERPL-MCNC: 9.5 MG/DL (ref 8.8–10.4)
CHLORIDE SERPL-SCNC: 108 MMOL/L (ref 98–107)
CREAT SERPL-MCNC: 0.51 MG/DL (ref 0.51–0.95)
EGFRCR SERPLBLD CKD-EPI 2021: >90 ML/MIN/1.73M2
EOSINOPHIL # BLD AUTO: 0.1 10E3/UL (ref 0–0.7)
EOSINOPHIL NFR BLD AUTO: 1 %
ERYTHROCYTE [DISTWIDTH] IN BLOOD BY AUTOMATED COUNT: 13.3 % (ref 10–15)
GLUCOSE SERPL-MCNC: 131 MG/DL (ref 70–99)
HCO3 SERPL-SCNC: 24 MMOL/L (ref 22–29)
HCT VFR BLD AUTO: 39 % (ref 35–47)
HGB BLD-MCNC: 13.4 G/DL (ref 11.7–15.7)
IMM GRANULOCYTES # BLD: 0 10E3/UL
IMM GRANULOCYTES NFR BLD: 0 %
LYMPHOCYTES # BLD AUTO: 2 10E3/UL (ref 0.8–5.3)
LYMPHOCYTES NFR BLD AUTO: 22 %
MCH RBC QN AUTO: 30.5 PG (ref 26.5–33)
MCHC RBC AUTO-ENTMCNC: 34.4 G/DL (ref 31.5–36.5)
MCV RBC AUTO: 89 FL (ref 78–100)
MONOCYTES # BLD AUTO: 0.6 10E3/UL (ref 0–1.3)
MONOCYTES NFR BLD AUTO: 6 %
NEUTROPHILS # BLD AUTO: 6.6 10E3/UL (ref 1.6–8.3)
NEUTROPHILS NFR BLD AUTO: 70 %
NRBC # BLD AUTO: 0 10E3/UL
NRBC BLD AUTO-RTO: 0 /100
PLATELET # BLD AUTO: 222 10E3/UL (ref 150–450)
POTASSIUM SERPL-SCNC: 4 MMOL/L (ref 3.4–5.3)
RBC # BLD AUTO: 4.39 10E6/UL (ref 3.8–5.2)
SODIUM SERPL-SCNC: 141 MMOL/L (ref 135–145)
TROPONIN T SERPL HS-MCNC: 7 NG/L
WBC # BLD AUTO: 9.4 10E3/UL (ref 4–11)

## 2025-06-30 PROCEDURE — 250N000013 HC RX MED GY IP 250 OP 250 PS 637: Performed by: EMERGENCY MEDICINE

## 2025-06-30 PROCEDURE — 80048 BASIC METABOLIC PNL TOTAL CA: CPT | Performed by: EMERGENCY MEDICINE

## 2025-06-30 PROCEDURE — 85004 AUTOMATED DIFF WBC COUNT: CPT | Performed by: EMERGENCY MEDICINE

## 2025-06-30 PROCEDURE — 84484 ASSAY OF TROPONIN QUANT: CPT | Performed by: EMERGENCY MEDICINE

## 2025-06-30 PROCEDURE — 83036 HEMOGLOBIN GLYCOSYLATED A1C: CPT | Performed by: INTERNAL MEDICINE

## 2025-06-30 PROCEDURE — 93005 ELECTROCARDIOGRAM TRACING: CPT

## 2025-06-30 PROCEDURE — 71046 X-RAY EXAM CHEST 2 VIEWS: CPT

## 2025-06-30 PROCEDURE — 99285 EMERGENCY DEPT VISIT HI MDM: CPT | Mod: 25

## 2025-06-30 PROCEDURE — 36415 COLL VENOUS BLD VENIPUNCTURE: CPT | Performed by: EMERGENCY MEDICINE

## 2025-06-30 RX ORDER — ACETAMINOPHEN 500 MG
1000 TABLET ORAL ONCE
Status: COMPLETED | OUTPATIENT
Start: 2025-06-30 | End: 2025-06-30

## 2025-06-30 RX ORDER — CYCLOBENZAPRINE HCL 10 MG
10 TABLET ORAL ONCE
Status: COMPLETED | OUTPATIENT
Start: 2025-06-30 | End: 2025-06-30

## 2025-06-30 RX ORDER — KETOROLAC TROMETHAMINE 15 MG/ML
15 INJECTION, SOLUTION INTRAMUSCULAR; INTRAVENOUS ONCE
Status: DISCONTINUED | OUTPATIENT
Start: 2025-06-30 | End: 2025-06-30

## 2025-06-30 RX ADMIN — ACETAMINOPHEN 1000 MG: 500 TABLET ORAL at 22:17

## 2025-06-30 RX ADMIN — HYDROMORPHONE HYDROCHLORIDE 1 MG: 2 TABLET ORAL at 23:01

## 2025-06-30 RX ADMIN — CYCLOBENZAPRINE 10 MG: 10 TABLET, FILM COATED ORAL at 22:18

## 2025-06-30 ASSESSMENT — ACTIVITIES OF DAILY LIVING (ADL)
ADLS_ACUITY_SCORE: 62

## 2025-07-01 ENCOUNTER — APPOINTMENT (OUTPATIENT)
Dept: INTERVENTIONAL RADIOLOGY/VASCULAR | Facility: CLINIC | Age: 35
End: 2025-07-01
Attending: INTERNAL MEDICINE
Payer: COMMERCIAL

## 2025-07-01 VITALS
OXYGEN SATURATION: 97 % | HEART RATE: 85 BPM | RESPIRATION RATE: 20 BRPM | WEIGHT: 237 LBS | HEIGHT: 62 IN | DIASTOLIC BLOOD PRESSURE: 95 MMHG | BODY MASS INDEX: 43.61 KG/M2 | SYSTOLIC BLOOD PRESSURE: 131 MMHG | TEMPERATURE: 98.2 F

## 2025-07-01 PROBLEM — R07.9 RIGHT-SIDED CHEST PAIN: Status: ACTIVE | Noted: 2025-07-01

## 2025-07-01 PROBLEM — Z78.9 PROBLEM WITH VASCULAR ACCESS: Status: ACTIVE | Noted: 2025-07-01

## 2025-07-01 LAB
ALBUMIN UR-MCNC: NEGATIVE MG/DL
AMORPH CRY #/AREA URNS HPF: ABNORMAL /HPF
APPEARANCE UR: ABNORMAL
ATRIAL RATE - MUSE: 102 BPM
BACTERIA #/AREA URNS HPF: ABNORMAL /HPF
BILIRUB UR QL STRIP: NEGATIVE
COLOR UR AUTO: ABNORMAL
DIASTOLIC BLOOD PRESSURE - MUSE: NORMAL MMHG
EST. AVERAGE GLUCOSE BLD GHB EST-MCNC: 120 MG/DL
GLUCOSE BLDC GLUCOMTR-MCNC: 127 MG/DL (ref 70–99)
GLUCOSE UR STRIP-MCNC: NEGATIVE MG/DL
HBA1C MFR BLD: 5.8 %
HCG UR QL: NEGATIVE
HGB UR QL STRIP: ABNORMAL
INTERPRETATION ECG - MUSE: NORMAL
KETONES UR STRIP-MCNC: NEGATIVE MG/DL
LEUKOCYTE ESTERASE UR QL STRIP: NEGATIVE
NITRATE UR QL: NEGATIVE
P AXIS - MUSE: 1 DEGREES
PH UR STRIP: 7.5 [PH] (ref 5–7)
PR INTERVAL - MUSE: 106 MS
QRS DURATION - MUSE: 100 MS
QT - MUSE: 350 MS
QTC - MUSE: 456 MS
R AXIS - MUSE: 69 DEGREES
RBC URINE: 1 /HPF
SP GR UR STRIP: 1.02 (ref 1–1.03)
SQUAMOUS EPITHELIAL: <1 /HPF
SYSTOLIC BLOOD PRESSURE - MUSE: NORMAL MMHG
T AXIS - MUSE: 1 DEGREES
UROBILINOGEN UR STRIP-MCNC: NORMAL MG/DL
VENTRICULAR RATE- MUSE: 102 BPM
WBC URINE: 2 /HPF

## 2025-07-01 PROCEDURE — 250N000013 HC RX MED GY IP 250 OP 250 PS 637: Performed by: NURSE PRACTITIONER

## 2025-07-01 PROCEDURE — G0378 HOSPITAL OBSERVATION PER HR: HCPCS

## 2025-07-01 PROCEDURE — 258N000003 HC RX IP 258 OP 636: Performed by: EMERGENCY MEDICINE

## 2025-07-01 PROCEDURE — 99207 PR NO BILLABLE SERVICE THIS VISIT: CPT | Performed by: NURSE PRACTITIONER

## 2025-07-01 PROCEDURE — 96374 THER/PROPH/DIAG INJ IV PUSH: CPT | Mod: 59

## 2025-07-01 PROCEDURE — 250N000011 HC RX IP 250 OP 636: Performed by: EMERGENCY MEDICINE

## 2025-07-01 PROCEDURE — 96376 TX/PRO/DX INJ SAME DRUG ADON: CPT

## 2025-07-01 PROCEDURE — 250N000011 HC RX IP 250 OP 636: Performed by: NURSE PRACTITIONER

## 2025-07-01 PROCEDURE — 250N000011 HC RX IP 250 OP 636: Performed by: RADIOLOGY

## 2025-07-01 PROCEDURE — 81025 URINE PREGNANCY TEST: CPT | Performed by: INTERNAL MEDICINE

## 2025-07-01 PROCEDURE — 82962 GLUCOSE BLOOD TEST: CPT

## 2025-07-01 PROCEDURE — 99207 PR NO CHARGE LOS: CPT | Performed by: NURSE PRACTITIONER

## 2025-07-01 PROCEDURE — 250N000009 HC RX 250: Performed by: NURSE PRACTITIONER

## 2025-07-01 PROCEDURE — 250N000011 HC RX IP 250 OP 636: Performed by: INTERNAL MEDICINE

## 2025-07-01 PROCEDURE — 99152 MOD SED SAME PHYS/QHP 5/>YRS: CPT

## 2025-07-01 PROCEDURE — 81001 URINALYSIS AUTO W/SCOPE: CPT | Performed by: EMERGENCY MEDICINE

## 2025-07-01 PROCEDURE — 96375 TX/PRO/DX INJ NEW DRUG ADDON: CPT | Mod: 59

## 2025-07-01 PROCEDURE — 250N000013 HC RX MED GY IP 250 OP 250 PS 637: Performed by: INTERNAL MEDICINE

## 2025-07-01 PROCEDURE — 99236 HOSP IP/OBS SAME DATE HI 85: CPT | Performed by: INTERNAL MEDICINE

## 2025-07-01 RX ORDER — HYDROCORTISONE 25 MG/G
CREAM TOPICAL 2 TIMES DAILY PRN
COMMUNITY

## 2025-07-01 RX ORDER — HYDROXYZINE HYDROCHLORIDE 10 MG/1
10 TABLET, FILM COATED ORAL ONCE
Status: COMPLETED | OUTPATIENT
Start: 2025-07-01 | End: 2025-07-01

## 2025-07-01 RX ORDER — ONDANSETRON 2 MG/ML
4 INJECTION INTRAMUSCULAR; INTRAVENOUS EVERY 6 HOURS PRN
Status: DISCONTINUED | OUTPATIENT
Start: 2025-07-01 | End: 2025-07-01 | Stop reason: HOSPADM

## 2025-07-01 RX ORDER — METOCLOPRAMIDE 5 MG/1
5 TABLET ORAL 4 TIMES DAILY PRN
Status: DISCONTINUED | OUTPATIENT
Start: 2025-07-01 | End: 2025-07-01 | Stop reason: HOSPADM

## 2025-07-01 RX ORDER — FENTANYL CITRATE 50 UG/ML
INJECTION, SOLUTION INTRAMUSCULAR; INTRAVENOUS PRN
Status: COMPLETED | OUTPATIENT
Start: 2025-07-01 | End: 2025-07-01

## 2025-07-01 RX ORDER — NALOXONE HYDROCHLORIDE 0.4 MG/ML
0.4 INJECTION, SOLUTION INTRAMUSCULAR; INTRAVENOUS; SUBCUTANEOUS
Status: DISCONTINUED | OUTPATIENT
Start: 2025-07-01 | End: 2025-07-01 | Stop reason: HOSPADM

## 2025-07-01 RX ORDER — FLUMAZENIL 0.1 MG/ML
0.2 INJECTION, SOLUTION INTRAVENOUS
Status: DISCONTINUED | OUTPATIENT
Start: 2025-07-01 | End: 2025-07-01 | Stop reason: HOSPADM

## 2025-07-01 RX ORDER — FENTANYL CITRATE 50 UG/ML
25-50 INJECTION, SOLUTION INTRAMUSCULAR; INTRAVENOUS EVERY 5 MIN PRN
Status: DISCONTINUED | OUTPATIENT
Start: 2025-07-01 | End: 2025-07-01

## 2025-07-01 RX ORDER — HEPARIN SODIUM (PORCINE) LOCK FLUSH IV SOLN 100 UNIT/ML 100 UNIT/ML
5 SOLUTION INTRAVENOUS ONCE
Status: COMPLETED | OUTPATIENT
Start: 2025-07-01 | End: 2025-07-01

## 2025-07-01 RX ORDER — LIDOCAINE HYDROCHLORIDE AND EPINEPHRINE 10; 10 MG/ML; UG/ML
INJECTION, SOLUTION INFILTRATION; PERINEURAL PRN
Status: COMPLETED | OUTPATIENT
Start: 2025-07-01 | End: 2025-07-01

## 2025-07-01 RX ORDER — PREGABALIN 100 MG/1
100 CAPSULE ORAL EVERY MORNING
Status: DISCONTINUED | OUTPATIENT
Start: 2025-07-02 | End: 2025-07-01 | Stop reason: HOSPADM

## 2025-07-01 RX ORDER — PROCHLORPERAZINE MALEATE 5 MG/1
10 TABLET ORAL EVERY 6 HOURS PRN
Status: DISCONTINUED | OUTPATIENT
Start: 2025-07-01 | End: 2025-07-01 | Stop reason: HOSPADM

## 2025-07-01 RX ORDER — CYCLOBENZAPRINE HCL 5 MG
5 TABLET ORAL 3 TIMES DAILY PRN
Status: DISCONTINUED | OUTPATIENT
Start: 2025-07-01 | End: 2025-07-01 | Stop reason: HOSPADM

## 2025-07-01 RX ORDER — DIPHENHYDRAMINE HYDROCHLORIDE 50 MG/ML
25 INJECTION, SOLUTION INTRAMUSCULAR; INTRAVENOUS EVERY 6 HOURS PRN
Status: DISCONTINUED | OUTPATIENT
Start: 2025-07-01 | End: 2025-07-01 | Stop reason: HOSPADM

## 2025-07-01 RX ORDER — ACETAMINOPHEN 325 MG/1
325 TABLET ORAL EVERY 4 HOURS PRN
COMMUNITY
Start: 2025-07-01 | End: 2025-07-01

## 2025-07-01 RX ORDER — PANTOPRAZOLE SODIUM 40 MG/1
40 TABLET, DELAYED RELEASE ORAL DAILY
Status: DISCONTINUED | OUTPATIENT
Start: 2025-07-01 | End: 2025-07-01 | Stop reason: HOSPADM

## 2025-07-01 RX ORDER — DEXTROSE MONOHYDRATE 25 G/50ML
25-50 INJECTION, SOLUTION INTRAVENOUS
Status: DISCONTINUED | OUTPATIENT
Start: 2025-07-01 | End: 2025-07-01 | Stop reason: HOSPADM

## 2025-07-01 RX ORDER — HEPARIN SODIUM (PORCINE) LOCK FLUSH IV SOLN 100 UNIT/ML 100 UNIT/ML
5-10 SOLUTION INTRAVENOUS
Status: DISCONTINUED | OUTPATIENT
Start: 2025-07-01 | End: 2025-07-01 | Stop reason: HOSPADM

## 2025-07-01 RX ORDER — PREGABALIN 100 MG/1
100 CAPSULE ORAL
Status: DISCONTINUED | OUTPATIENT
Start: 2025-07-01 | End: 2025-07-01 | Stop reason: HOSPADM

## 2025-07-01 RX ORDER — ACETAMINOPHEN 325 MG/1
650 TABLET ORAL
OUTPATIENT
Start: 2025-07-01

## 2025-07-01 RX ORDER — BUSPIRONE HYDROCHLORIDE 15 MG/1
15 TABLET ORAL 2 TIMES DAILY
Status: DISCONTINUED | OUTPATIENT
Start: 2025-07-01 | End: 2025-07-01 | Stop reason: HOSPADM

## 2025-07-01 RX ORDER — NALOXONE HYDROCHLORIDE 0.4 MG/ML
0.2 INJECTION, SOLUTION INTRAMUSCULAR; INTRAVENOUS; SUBCUTANEOUS
Status: DISCONTINUED | OUTPATIENT
Start: 2025-07-01 | End: 2025-07-01 | Stop reason: HOSPADM

## 2025-07-01 RX ORDER — CLONAZEPAM 0.5 MG/1
0.5 TABLET ORAL DAILY PRN
Status: DISCONTINUED | OUTPATIENT
Start: 2025-07-01 | End: 2025-07-01 | Stop reason: HOSPADM

## 2025-07-01 RX ORDER — ALBUTEROL SULFATE 0.83 MG/ML
2.5 SOLUTION RESPIRATORY (INHALATION) EVERY 6 HOURS PRN
Status: DISCONTINUED | OUTPATIENT
Start: 2025-07-01 | End: 2025-07-01 | Stop reason: HOSPADM

## 2025-07-01 RX ORDER — MORPHINE SULFATE 2 MG/ML
2 INJECTION, SOLUTION INTRAMUSCULAR; INTRAVENOUS
Status: DISCONTINUED | OUTPATIENT
Start: 2025-07-01 | End: 2025-07-01 | Stop reason: HOSPADM

## 2025-07-01 RX ORDER — DIPHENHYDRAMINE HYDROCHLORIDE 50 MG/ML
25 INJECTION, SOLUTION INTRAMUSCULAR; INTRAVENOUS ONCE
Status: COMPLETED | OUTPATIENT
Start: 2025-07-01 | End: 2025-07-01

## 2025-07-01 RX ORDER — ACETAMINOPHEN 325 MG/1
975 TABLET ORAL EVERY 8 HOURS
Status: DISCONTINUED | OUTPATIENT
Start: 2025-07-01 | End: 2025-07-01 | Stop reason: HOSPADM

## 2025-07-01 RX ORDER — ONDANSETRON 4 MG/1
4 TABLET, ORALLY DISINTEGRATING ORAL EVERY 6 HOURS PRN
Status: DISCONTINUED | OUTPATIENT
Start: 2025-07-01 | End: 2025-07-01 | Stop reason: HOSPADM

## 2025-07-01 RX ORDER — NALOXONE HYDROCHLORIDE 0.4 MG/ML
0.4 INJECTION, SOLUTION INTRAMUSCULAR; INTRAVENOUS; SUBCUTANEOUS
Status: DISCONTINUED | OUTPATIENT
Start: 2025-07-01 | End: 2025-07-01

## 2025-07-01 RX ORDER — CLINDAMYCIN PHOSPHATE 900 MG/50ML
900 INJECTION, SOLUTION INTRAVENOUS
Status: COMPLETED | OUTPATIENT
Start: 2025-07-01 | End: 2025-07-01

## 2025-07-01 RX ORDER — DIPHENHYDRAMINE HYDROCHLORIDE 50 MG/ML
10 INJECTION, SOLUTION INTRAMUSCULAR; INTRAVENOUS ONCE
Status: COMPLETED | OUTPATIENT
Start: 2025-07-01 | End: 2025-07-01

## 2025-07-01 RX ORDER — ACETAMINOPHEN 325 MG/1
325 TABLET ORAL EVERY 4 HOURS PRN
Status: DISCONTINUED | OUTPATIENT
Start: 2025-07-01 | End: 2025-07-01 | Stop reason: HOSPADM

## 2025-07-01 RX ORDER — HYDROXYZINE HYDROCHLORIDE 25 MG/1
25 TABLET, FILM COATED ORAL ONCE
Status: COMPLETED | OUTPATIENT
Start: 2025-07-01 | End: 2025-07-01

## 2025-07-01 RX ORDER — SODIUM CHLORIDE 9 MG/ML
INJECTION, SOLUTION INTRAVENOUS ONCE
Status: COMPLETED | OUTPATIENT
Start: 2025-07-01 | End: 2025-07-01

## 2025-07-01 RX ORDER — NALOXONE HYDROCHLORIDE 0.4 MG/ML
0.2 INJECTION, SOLUTION INTRAMUSCULAR; INTRAVENOUS; SUBCUTANEOUS
Status: DISCONTINUED | OUTPATIENT
Start: 2025-07-01 | End: 2025-07-01

## 2025-07-01 RX ORDER — PREGABALIN 100 MG/1
200 CAPSULE ORAL EVERY EVENING
Status: DISCONTINUED | OUTPATIENT
Start: 2025-07-01 | End: 2025-07-01 | Stop reason: HOSPADM

## 2025-07-01 RX ORDER — NICOTINE POLACRILEX 4 MG
15-30 LOZENGE BUCCAL
Status: DISCONTINUED | OUTPATIENT
Start: 2025-07-01 | End: 2025-07-01 | Stop reason: HOSPADM

## 2025-07-01 RX ORDER — DIPHENHYDRAMINE HCL 25 MG
25 CAPSULE ORAL EVERY 6 HOURS PRN
Status: DISCONTINUED | OUTPATIENT
Start: 2025-07-01 | End: 2025-07-01 | Stop reason: HOSPADM

## 2025-07-01 RX ORDER — ACETAMINOPHEN 500 MG
500-1000 TABLET ORAL EVERY 6 HOURS PRN
COMMUNITY

## 2025-07-01 RX ORDER — TRAMADOL HYDROCHLORIDE 50 MG/1
50 TABLET ORAL EVERY 6 HOURS PRN
Status: DISCONTINUED | OUTPATIENT
Start: 2025-07-01 | End: 2025-07-01 | Stop reason: HOSPADM

## 2025-07-01 RX ORDER — HEPARIN SODIUM,PORCINE 10 UNIT/ML
5-10 VIAL (ML) INTRAVENOUS EVERY 24 HOURS
Status: DISCONTINUED | OUTPATIENT
Start: 2025-07-01 | End: 2025-07-01 | Stop reason: HOSPADM

## 2025-07-01 RX ORDER — HEPARIN SODIUM,PORCINE 10 UNIT/ML
5-10 VIAL (ML) INTRAVENOUS
Status: DISCONTINUED | OUTPATIENT
Start: 2025-07-01 | End: 2025-07-01 | Stop reason: HOSPADM

## 2025-07-01 RX ADMIN — HYDROXYZINE HYDROCHLORIDE 10 MG: 10 TABLET, FILM COATED ORAL at 01:30

## 2025-07-01 RX ADMIN — FENTANYL CITRATE 50 MCG: 50 INJECTION INTRAMUSCULAR; INTRAVENOUS at 09:33

## 2025-07-01 RX ADMIN — DIPHENHYDRAMINE HYDROCHLORIDE 25 MG: 50 INJECTION, SOLUTION INTRAMUSCULAR; INTRAVENOUS at 10:35

## 2025-07-01 RX ADMIN — PREGABALIN 100 MG: 100 CAPSULE ORAL at 11:48

## 2025-07-01 RX ADMIN — ONDANSETRON 4 MG: 2 INJECTION, SOLUTION INTRAMUSCULAR; INTRAVENOUS at 10:57

## 2025-07-01 RX ADMIN — DIPHENHYDRAMINE HYDROCHLORIDE 25 MG: 50 INJECTION, SOLUTION INTRAMUSCULAR; INTRAVENOUS at 09:10

## 2025-07-01 RX ADMIN — LIDOCAINE HYDROCHLORIDE 9 ML: 10 INJECTION, SOLUTION INFILTRATION; PERINEURAL at 09:54

## 2025-07-01 RX ADMIN — CLINDAMYCIN PHOSPHATE 900 MG: 900 INJECTION, SOLUTION INTRAVENOUS at 09:20

## 2025-07-01 RX ADMIN — CLONAZEPAM 0.5 MG: 0.5 TABLET ORAL at 11:53

## 2025-07-01 RX ADMIN — DIPHENHYDRAMINE HYDROCHLORIDE 10 MG: 50 INJECTION, SOLUTION INTRAMUSCULAR; INTRAVENOUS at 04:22

## 2025-07-01 RX ADMIN — SODIUM CHLORIDE: 0.9 INJECTION, SOLUTION INTRAVENOUS at 00:23

## 2025-07-01 RX ADMIN — FENTANYL CITRATE 50 MCG: 50 INJECTION INTRAMUSCULAR; INTRAVENOUS at 09:32

## 2025-07-01 RX ADMIN — MIDAZOLAM 2 MG: 1 INJECTION INTRAMUSCULAR; INTRAVENOUS at 09:32

## 2025-07-01 RX ADMIN — HYDROMORPHONE HYDROCHLORIDE 1 MG: 1 INJECTION, SOLUTION INTRAMUSCULAR; INTRAVENOUS; SUBCUTANEOUS at 00:22

## 2025-07-01 RX ADMIN — MIDAZOLAM 2 MG: 1 INJECTION INTRAMUSCULAR; INTRAVENOUS at 09:39

## 2025-07-01 RX ADMIN — LIDOCAINE HYDROCHLORIDE,EPINEPHRINE BITARTRATE 9 ML: 10; .01 INJECTION, SOLUTION INFILTRATION; PERINEURAL at 09:54

## 2025-07-01 RX ADMIN — Medication 5 ML: at 09:56

## 2025-07-01 RX ADMIN — HEPARIN, PORCINE (PF) 10 UNIT/ML INTRAVENOUS SYRINGE 5 ML: at 11:48

## 2025-07-01 ASSESSMENT — ACTIVITIES OF DAILY LIVING (ADL)
ADLS_ACUITY_SCORE: 62
ADLS_ACUITY_SCORE: 58
DEPENDENT_IADLS:: CLEANING;COOKING;LAUNDRY;SHOPPING;MEAL PREPARATION;MONEY MANAGEMENT;TRANSPORTATION
ADLS_ACUITY_SCORE: 58
ADLS_ACUITY_SCORE: 62
ADLS_ACUITY_SCORE: 58
ADLS_ACUITY_SCORE: 64

## 2025-07-01 NOTE — PLAN OF CARE
Patient's After Visit Summary was reviewed with patient    Patient verbalized understanding of After Visit Summary, recommended follow up and was given an opportunity to ask questions. YES    Discharge medications sent home with patient/family: NO    Discharged with self

## 2025-07-01 NOTE — DISCHARGE SUMMARY
"[facility]  Hospitalist Discharge Summary      Date of Admission:  6/30/2025  Date of Discharge:  7/1/2025  Discharging Provider: HU Becker CNP  Discharge Service: Hospitalist Service    Discharge Diagnoses   See below    Clinically Significant Risk Factors     # Morbid Obesity: Estimated body mass index is 43.35 kg/m  as calculated from the following:    Height as of this encounter: 1.575 m (5' 2\").    Weight as of this encounter: 107.5 kg (237 lb).       Follow-ups Needed After Discharge   Follow-up Appointments       Hospital Follow-up with Existing Primary Care Provider (PCP)          Schedule Primary Care visit within: 14 Days               Unresulted Labs Ordered in the Past 30 Days of this Admission       No orders found for last 31 day(s).        These results will be followed up by NA    Discharge Disposition   Discharged to home  Condition at discharge: Stable    Hospital Course   Nevin Alvarado is a 34 year old female with past medical history significant for pulmonary embolism on apixaban.  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  In the emergency room evaluation showed that her port is flipped and could not be accessed.  IR was contacted and recommended revision of port.  Patient is being admitted for IR procedure in a.m.        Malfunctioning port  Localized pain  - IR planning port revision this am.  - Scheduled and PRN APAP, PRN Tramadol.  IV Morphine for breakthrough pain.   - N.p.o. for procedure.     Chronic inflammatory demyelinating polyneuropathy  - Followed by neurology scheduled for intermittent IVIG infusions   "   Diabetes mellitus . A1C 5.8  - Diabetic diet  - Monitor.       Pulmonary embolism  - Patient on apixaban last evening's Eliquis was held, procedure 7/1/2025.  - Resume Eliquis after the procedure     Sleep apnea  - Unclear if she needs to continue using CPAP per patient  - She is due for sleep study  - Monitor oxygenation     Anxiety depression  - Continue prior to admission meds once reconciled    Consultations This Hospital Stay   CARE MANAGEMENT / SOCIAL WORK IP CONSULT    Code Status   Full Code    Time Spent on this Encounter   I, HU Becker CNP, personally saw the patient today and spent greater than 30 minutes discharging this patient.       HU Becker CNP  [facility] OBSERVATION DEPT  [address] [place] [address]  Phone: [phone]  ______________________________________________________________________    Physical Exam   Vital Signs: Temp: 98.2  F (36.8  C) Temp src: Oral BP: (!) 131/95 Pulse: 85   Resp: 20 SpO2: 97 % O2 Device: None (Room air) Oxygen Delivery: 3 LPM  Weight: 237 lbs 0 oz    GEN:   Alert, oriented x 3, appears comfortable, NAD.  NECK:   Supple ,no mass or thyromegaly   HEENT:  Normocephalic/atraumatic, no scleral icterus, no nasal discharge, mouth moist.  CV:   Regular rate and rhythm, no murmur or JVD.  S1 + S2 noted, no S3 or S4.  LUNGS:   Clear to auscultation bilaterally without rales/rhonchi/wheezing/retractions.  Symmetric chest rise on inhalation noted.  ABD:   Active bowel sounds, soft, non-tender/non-distended.  No rebound/guarding/rigidity.  EXT:   No edema.  No cyanosis.  No joint synovitis noted.  SKIN:   Dry to touch, no exanthems noted in the visualized areas.  R upper chest port palpable and feels inverted. No cellulitic changes.  Neurologic: Grossly intact,non focal.   Neuropsychiatric:  General: normal, calm and normal eye contact  Level of consciousness: alert / normal  Affect: normal  Orientation: oriented  to self, place, time and situation        Primary Care Physician   Latonya Knight    Discharge Orders      Med Therapy Management Referral      Reason for your hospital stay    Malfunctioning port    Consults:   Interventional Radiology    Procedures:  R chest port revision with retention sutures placed.     Follow up:  Follow up with PCP and Neurology as needed.     Activity    Your activity upon discharge: activity as tolerated and no driving today.     Tubes and Drains    Current Tubes and Drains:     CVC Line  Duration           Port a Cath 06/20/25 Right Chest wall 10 days              When to contact your care team    Call your primary doctor if you have any of the following: any questions or concerns.     Wound care and dressings    Instructions to care for your wound at home: as directed, ice to area for comfort, may get incision wet in shower but do not soak or scrub, and do not lift anything heavier than 5 to 10 pounds for 3 days.     Diet    Follow this diet upon discharge: Current Diet:Orders Placed This Encounter      Regular Diet Adult     Hospital Follow-up with Existing Primary Care Provider (PCP)            Significant Results and Procedures   Most Recent 3 CBC's:  Recent Labs   Lab Test 06/30/25 2207 06/26/25 1808 06/20/25  2249   WBC 9.4 8.5 12.2*   HGB 13.4 13.9 12.7   MCV 89 91 92    203 226     Most Recent 3 BMP's:  Recent Labs   Lab Test 07/01/25  0126 06/30/25 2207 06/26/25 1808 06/20/25  2249   NA  --  141 140 137   POTASSIUM  --  4.0 4.0 3.8   CHLORIDE  --  108* 105 100   CO2  --  24 24 25   BUN  --  12.4 14.6 17.4   CR  --  0.51 0.55 0.55   ANIONGAP  --  9 11 12   KELBY  --  9.5 8.9 8.8   * 131* 108* 106*     Most Recent 2 LFT's:  Recent Labs   Lab Test 06/26/25 1808 05/20/25  1931   AST 28 17   ALT 33 41   ALKPHOS 56 76   BILITOTAL 0.2 0.2     Most Recent 3 INR's:  Recent Labs   Lab Test 06/13/25  0920 08/23/24  2216 04/08/24  0604   INR 1.07 1.36* 1.23*     7-Day Micro  Results       No results found for the last 168 hours.        ,   Results for orders placed or performed during the hospital encounter of 06/30/25   Chest XR,  PA & LAT    Addendum: 6/30/2025    CLINICAL ADDENDUM:   Clinical information in this report has been modified from the previous version as follows:     An addendum report is being issued after discussion with the emergency room physician. There is difficulty accessing the patient's port. Upon reviewing the current chest radiograph and comparing to the initial placement study from 6/16/2025 it appears   that the right IJ chest port has flipped within the port pocket. This explains why it is unable to be accessed.    The patient will be admitted for planned chest port revision tomorrow.    END ADDENDUM      Narrative    EXAM: XR CHEST 2 VIEWS  LOCATION: [facility]  DATE: 6/30/2025    INDICATION: R chest pain  COMPARISON: 6/26/2025      Impression    IMPRESSION: Right-sided portacatheter. Normal heart size. Lungs clear. Scoliosis.   IR Port Check Right    Narrative    [place] [facility]  LOCATION: [facility]  DATE: 7/1/2025    PROCEDURE: CHEST PORT REVISION    INTERVENTIONAL RADIOLOGIST: Rolly Samuel MD    INDICATION: 34-year-old female with malpositioned/flipped right-sided chest port. The port was placed 6/16/2025.    CONSENT: The risks, benefits and alternatives of the procedure were discussed with the patient  in detail. All questions were answered. Informed consent was given to proceed with the procedure.    MODERATE SEDATION: Versed 4 mg IV; Fentanyl 100 mcg IV.  Under physician supervision, Versed and fentanyl were administered for moderate sedation. Pulse oximetry, heart rate and blood pressure were continuously monitored by an independent trained   observer. The physician spent 26 minutes of face-to-face sedation time with the patient.    CONTRAST: None.  ANTIBIOTICS: 100 mg IV clindamycin  ADDITIONAL  MEDICATIONS: None.    RADIATION EXPOSURE: None.    COMPLICATIONS: No immediate complications.    CONSENT: The risks, benefits and alternatives of chest port removal were discussed with the patient  in detail. All questions were answered. Informed consent was given to proceed with the procedure.    STERILE BARRIER TECHNIQUE: Maximum sterile barrier technique was used. Cutaneous antisepsis was performed at the operative site with application of 2% chlorhexidine and large sterile drape. Prior to the procedure, the  and assistant performed   hand hygiene and wore hat, mask, sterile gown, and sterile gloves during the entire procedure.    PROCEDURE:  The patient was appropriately identified, brought to the Interventional Radiology suite, placed in the supine position, and an official timeout was performed. The right anterior chest was then prepped and draped in standard sterile fashion. A   fluoroscopic image was obtained demonstrating slipped position of the port. Then under 1% lidocaine local anesthesia, a small incision was made over the scar from prior port placement. Then, using a combination of blunt and sharp dissection, the port   was exposed. The port was secured in place using 2 separate anchoring sutures. The port was placed within the pocket and a fluoroscopic image was again obtained.. Next the incision was closed with interrupted buried 3-0 absorbable sutures, followed by a   subcuticular running 4-0 absorbable suture.  Skin glue was then applied to the incision. The patient tolerated procedure well without any immediate complication and was transferred to the recovery area in stable condition.      Impression    IMPRESSION:    Successful right venous chest port revision.         *Note: Due to a large number of results and/or encounters for the requested time period, some results have not been displayed. A complete set of results can be found in Results Review.       Discharge Medications       Review of your medicines        CONTINUE these medicines which have NOT CHANGED        Dose / Directions   acetaminophen 500 MG tablet  Commonly known as: TYLENOL      Dose: 500-1,000 mg  Take 500-1,000 mg by mouth every 6 hours as needed for mild pain.  Refills: 0     albuterol (2.5 MG/3ML) 0.083% neb solution  Commonly known as: PROVENTIL      Dose: 2.5 mg  Take 1 vial (2.5 mg) by nebulization every 6 hours as needed for shortness of breath or wheezing  Quantity: 90 mL  Refills: 0     amitriptyline 75 MG tablet  Commonly known as: ELAVIL  Used for: CIDP (chronic inflammatory demyelinating polyneuropathy) (H)      Dose: 75 mg  Take 1 tablet (75 mg) by mouth at bedtime.  Quantity: 60 tablet  Refills: 0     ammonium lactate 12 % external cream  Commonly known as: AMLACTIN      Apply topically 2 times daily as needed for dry skin (calluses). Apply to feet  Refills: 0     apixaban ANTICOAGULANT 5 MG tablet  Commonly known as: ELIQUIS      Dose: 5 mg  Take 5 mg by mouth 2 times daily.  Refills: 0     busPIRone 15 MG tablet  Commonly known as: BUSPAR      Dose: 15 mg  Take 15 mg by mouth 2 times daily.  Refills: 0     cetirizine 10 MG tablet  Commonly known as: zyrTEC      Dose: 10 mg  Take 10 mg by mouth 2 times daily.  Refills: 0     cholecalciferol 50 MCG (2000 UT) Caps      Dose: 50 mcg  Take 50 mcg by mouth daily.  Refills: 0     clonazePAM 0.5 MG tablet  Commonly known as: klonoPIN  Used for: CIDP (chronic inflammatory demyelinating polyneuropathy) (H), Acute midline back pain, unspecified back location, Anxiety disorder, unspecified type      Dose: 0.5 mg  Take 1 tablet (0.5 mg) by mouth daily as needed for anxiety  Quantity: 7 tablet  Refills: 0     cyanocobalamin 1000 MCG tablet  Commonly known as: VITAMIN B-12      Dose: 1,000 mcg  Take 1,000 mcg by mouth daily.  Refills: 0     cyclobenzaprine 5 MG tablet  Commonly known as: FLEXERIL      Dose: 5 mg  Take 5 mg by mouth 3 times daily as needed for muscle  spasms.  Refills: 0     docusate sodium 100 MG capsule  Commonly known as: COLACE      Dose: 100 mg  Take 100 mg by mouth daily.  Refills: 0     ferrous sulfate 325 (65 Fe) MG tablet  Commonly known as: FEROSUL  Used for: Red blood cell antibody positive      Dose: 325 mg  Take 1 tablet (325 mg) by mouth daily (with breakfast)  Quantity: 30 tablet  Refills: 0     Fleet Enema Enem      Dose: 1 enema  Place 1 enema rectally daily as needed (constipation).  Refills: 0     hydrocortisone (Perianal) 2.5 % cream  Commonly known as: ANUSOL-HC      Place rectally 2 times daily as needed for hemorrhoids.  Refills: 0     hydrocortisone 2.5 % cream      Apply topically 2 times daily as needed for other (Apply to face).  Refills: 0     hydroxychloroquine 200 MG tablet  Commonly known as: PLAQUENIL      Dose: 200 mg  Take 200 mg by mouth 2 times daily.  Refills: 0     hydrOXYzine HCl 25 MG tablet  Commonly known as: ATARAX      Dose: 25 mg  Take 25 mg by mouth every 8 hours as needed for anxiety.  Refills: 0     lidocaine (Anorectal) 5 % Crea      Externally apply topically daily as needed.  Refills: 0     metoclopramide 5 MG tablet  Commonly known as: REGLAN      Dose: 5 mg  Take 5 mg by mouth 4 times daily as needed for vomiting.  Refills: 0     naloxone 4 MG/0.1ML nasal spray  Commonly known as: NARCAN      Dose: 4 mg  Spray 1 spray (4 mg) into one nostril alternating nostrils as needed for opioid reversal. every 2-3 minutes until assistance arrives  Quantity: 2 each  Refills: 0     norethindrone 5 MG tablet  Commonly known as: AYGESTIN      Dose: 5 mg  Take 5 mg by mouth daily  Refills: 0     nystatin 629155 UNIT/GM external cream  Commonly known as: MYCOSTATIN  Used for: Class 3 severe obesity with serious comorbidity and body mass index (BMI) of 50.0 to 59.9 in adult, unspecified obesity type (H), Type 2 diabetes mellitus without complication, without long-term current use of insulin (H), Intertriginous candidiasis       Apply topically 2 times daily as needed for dry skin.  Quantity: 30 g  Refills: 1     ondansetron 4 MG ODT tab  Commonly known as: ZOFRAN ODT      Dose: 4 mg  Take 4 mg by mouth every 8 hours as needed for nausea.  Refills: 0     pantoprazole 40 MG EC tablet  Commonly known as: PROTONIX      Dose: 40 mg  Take 40 mg by mouth daily.  Refills: 0     polyethylene glycol 17 GM/Dose powder  Commonly known as: MIRALAX      Dose: 17 g  Take 17 g by mouth daily as needed for constipation  Refills: 0     * pregabalin 100 MG capsule  Commonly known as: LYRICA      Dose: 200 mg  Take 200 mg by mouth every evening.  Refills: 0     * pregabalin 100 MG capsule  Commonly known as: LYRICA  Used for: CIDP (chronic inflammatory demyelinating polyneuropathy) (H)      Dose: 100 mg  Take 1 capsule (100 mg) by mouth daily (with lunch).  Quantity: 60 capsule  Refills: 0     * pregabalin 100 MG capsule  Commonly known as: LYRICA      Dose: 100 mg  Take 100 mg by mouth every morning. In addition to evening dose  Refills: 0     prenatal multivitamin w/iron 27-0.8 MG tablet  Used for: Type 2 diabetes mellitus without complication, without long-term current use of insulin (H), Class 3 severe obesity with serious comorbidity and body mass index (BMI) of 50.0 to 59.9 in adult, unspecified obesity type (H)      Dose: 1 tablet  Take 1 tablet by mouth daily.  Quantity: 90 tablet  Refills: 3     sennosides 8.6 MG tablet  Commonly known as: SENOKOT      Dose: 1 tablet  Take 1 tablet by mouth 2 times daily as needed for constipation.  Refills: 0           * This list has 3 medication(s) that are the same as other medications prescribed for you. Read the directions carefully, and ask your doctor or other care provider to review them with you.                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 Blood products may be delayed. Draw patient 24 hours prior to transfusion. Draw one red top and two purple top tubes for all type and screen orders.    Clavulanic Acid Angioedema    Haemophilus B Polysaccharide Vaccine Nausea and Vomiting    Iodinated Contrast Media Itching and Other (See Comments)     Vaginal irritation, burning    Iodine Hives and Other (See Comments)    Oxycodone Swelling    Sulfamethoxazole Angioedema and Other (See Comments)    Trimethoprim Angioedema, Swelling and Other (See Comments)    Adhesive Tape Rash     Silicone type            Band-Aid Anti-Itch      Other reaction(s): adhesive allergy    Cephalosporins Rash    Fentanyl Itching     *Patient states no allergies.    Lamotrigine Rash     Possibly associated with Lamictal.     Naltrexone Other (See Comments)     Reaction(s): Vivid dreams.

## 2025-07-01 NOTE — ED TRIAGE NOTES
Pt BIBA from home for 9/10 right shoulder pain that radiates down arm and up neck. Now complains of HA but no CP. Port placed 6/16 for neuropathy pain management. VSS. ABCs intact. AO.      Triage Assessment (Adult)       Row Name 06/30/25 2028          Triage Assessment    Airway WDL WDL        Respiratory WDL    Respiratory WDL WDL        Skin Circulation/Temperature WDL    Skin Circulation/Temperature WDL WDL        Cardiac WDL    Cardiac WDL WDL        Peripheral/Neurovascular WDL    Peripheral Neurovascular WDL WDL        Cognitive/Neuro/Behavioral WDL    Cognitive/Neuro/Behavioral WDL WDL

## 2025-07-01 NOTE — PLAN OF CARE
"Goal Outcome Evaluation:      Plan of Care Reviewed With: patient      Took care of patient from [id number]. Patient alert and oriented x4 and call light appropriate. Ambulating SBA. Intermittent nausea reported. No pain reported. Port intact post IR placement. Agreeable to discharge this afternoon with frequent redirection and explanation of next steps.     Problem: Adult Inpatient Plan of Care  Goal: Plan of Care Review  Description: The Plan of Care Review/Shift note should be completed every shift.  The Outcome Evaluation is a brief statement about your assessment that the patient is improving, declining, or no change.  This information will be displayed automatically on your shift  note.  Outcome: Progressing  Flowsheets (Taken 7/1/2025 1321)  Plan of Care Reviewed With: patient  Goal: Patient-Specific Goal (Individualized)  Description: You can add care plan individualizations to a care plan. Examples of Individualization might be:  \"Parent requests to be called daily at 9am for status\", \"I have a hard time hearing out of my right ear\", or \"Do not touch me to wake me up as it startles  me\".  Outcome: Progressing  Goal: Absence of Hospital-Acquired Illness or Injury  Outcome: Progressing  Intervention: Identify and Manage Fall Risk  Recent Flowsheet Documentation  Taken 7/1/2025 1317 by Liz Briones RN  Safety Promotion/Fall Prevention: safety round/check completed  Intervention: Prevent and Manage VTE (Venous Thromboembolism) Risk  Recent Flowsheet Documentation  Taken 7/1/2025 1317 by Liz Briones RN  VTE Prevention/Management: SCDs off (sequential compression devices)  Intervention: Prevent Infection  Recent Flowsheet Documentation  Taken 7/1/2025 1317 by Liz Briones RN  Infection Prevention:   hand hygiene promoted   rest/sleep promoted  Goal: Optimal Comfort and Wellbeing  Outcome: Progressing  Goal: Readiness for Transition of Care  Outcome: Progressing                  "

## 2025-07-01 NOTE — CONSULTS
"INTERVENTIONAL RADIOLOGY - [place] INTERVENTIONAL CONSULT MODEL NOTE    Procedure Requested: Chest port evaluation  Requesting Provider: Kamar Cosme MD.    Brief History/Plan of Care:  Patient is presenting to ER with pain near the port pocket of her chest port.  This was placed at [facility] on 6/16/[facility]25 and she has had pain since that time.    An ultrasound was performed on 6/18/2025 which was negative for DVT.    [facility].    In reviewing the chest radiograph from tonight and comparing to the prior port placement images from 6/16/2025 and chest radiograph from 6/26/2025 it appears that the port reservoir has \"flipped\" within the port pocket.  This will need to be revised.    The plan will be to admit the patient and keep NPO for planned port revision at [facility] on Tuesday 7/1/2025.    I have placed an appropriate order for the procedure as requested by the ordering provider.    Dick Sloan MD  Vascular and Interventional Radiology  "

## 2025-07-01 NOTE — PRE-PROCEDURE
Interventional Radiology - Consultation Note:  [facility] [place]  07/01/2025     PRE-PROCEDURE NOTE    PROCEDURE: Port revision    CHIEF COMPLAINT / REASON FOR PROCEDURE: port malposition    History and Physical Reviewed: The history and physical has been reviewed and patient has been examined. There are no changes in patient's medical status.      ALLERGIES:  Allergies   Allergen Reactions    Influenza Vaccines Other (See Comments) and Nausea and Vomiting     HUT Reaction Type: Intolerance    Latex Rash           Oseltamivir Hives    Penicillins Anaphylaxis    Vancomycin Itching, Swelling and Rash     Flushed face, very itchy    Hydrocodone Nausea and Vomiting and GI Disturbance     vomiting for days    Blood-Group Specific Substance Other (See Comments)     Patient has anti-Cw, anti-K (Angella), Warm auto and nonspecific antibodies. Blood products may be delayed. Draw patient 24 hours prior to transfusion. Draw one red top and two purple top tubes for all type and screen orders.    Clavulanic Acid Angioedema    Haemophilus B Polysaccharide Vaccine Nausea and Vomiting    Iodinated Contrast Media Itching and Other (See Comments)     Vaginal irritation, burning    Iodine Hives and Other (See Comments)    Oxycodone Swelling    Sulfamethoxazole Angioedema and Other (See Comments)    Trimethoprim Angioedema, Swelling and Other (See Comments)    Adhesive Tape Rash     Silicone type            Band-Aid Anti-Itch      Other reaction(s): adhesive allergy    Cephalosporins Rash    Fentanyl Itching     *Patient states no allergies.    Lamotrigine Rash     Possibly associated with Lamictal.     Naltrexone Other (See Comments)     Reaction(s): Vivid dreams.        MEDICATIONS:  Current Facility-Administered Medications   Medication Dose Route Frequency Provider Last Rate Last Admin    acetaminophen (TYLENOL) tablet 325 mg  325 mg Oral Q4H PRN Yuniel Hutton APRN CNP        acetaminophen (TYLENOL) tablet 975  mg  975 mg Oral Q8H Yuniel Hutton APRN CNP        albuterol (PROVENTIL) neb solution 2.5 mg  2.5 mg Nebulization Q6H PRN Lakisha Riley MD        amitriptyline (ELAVIL) tablet 75 mg  75 mg Oral At Bedtime Lakisha Riley MD        busPIRone (BUSPAR) tablet 15 mg  15 mg Oral BID Lakisha Riley MD        glucose gel 15-30 g  15-30 g Oral Q15 Min PRN Lakisha Riley MD        Or    dextrose 50 % injection 25-50 mL  25-50 mL Intravenous Q15 Min PRN Lakisha Riley MD        Or    glucagon injection 1 mg  1 mg Subcutaneous Q15 Min PRN Lakisha Riley MD        diphenhydrAMINE (BENADRYL) capsule 25 mg  25 mg Oral Q6H PRN Yuniel Hutton APRN CNP        Or    diphenhydrAMINE (BENADRYL) injection 25 mg  25 mg Intravenous Q6H PRN Yuniel Hutton APRN CNP        fentaNYL (PF) (SUBLIMAZE) injection 25-50 mcg  25-50 mcg Intravenous Q5 Min PRN Parris Magdaleno APRN CNP        flumazenil (ROMAZICON) injection 0.2 mg  0.2 mg Intravenous q1 min prn Parris Magdaleno APRN CNP        insulin aspart (NovoLOG) injection (RAPID ACTING)  1-7 Units Subcutaneous Q4H Lakisha Riley MD        midazolam (VERSED) injection 0.5-2 mg  0.5-2 mg Intravenous Q4 Min PRN Parris Magdaleno APRN CNP        morphine (PF) injection 2 mg  2 mg Intravenous Q3H PRN Yuniel Hutton APRN CNP        naloxone (NARCAN) injection 0.2 mg  0.2 mg Intravenous Q2 Min PRN Parris Magdaleno APRN CNP        Or    naloxone (NARCAN) injection 0.4 mg  0.4 mg Intravenous Q2 Min PRN Parris Magdaleno APRN CNP        Or    naloxone (NARCAN) injection 0.2 mg  0.2 mg Intramuscular Q2 Min PRN Leitschuh, Parris Agatha, APRN CNP        Or    naloxone (NARCAN) injection 0.4 mg  0.4 mg Intramuscular Q2 Min PRN Parris Magdaleno APRN CNP        naloxone (NARCAN) injection 0.2 mg  0.2 mg Intravenous Q2 Min PRN Lakisha Riley MD        Or    naloxone (NARCAN)  "injection 0.4 mg  0.4 mg Intravenous Q2 Min PRN Lakisha Riley MD        Or    naloxone (NARCAN) injection 0.2 mg  0.2 mg Intramuscular Q2 Min PRN Lakisha Riley MD        Or    naloxone (NARCAN) injection 0.4 mg  0.4 mg Intramuscular Q2 Min PRN Lakisha Riley MD        ondansetron (ZOFRAN ODT) ODT tab 4 mg  4 mg Oral Q6H PRN Lakisha Riley MD        Or    ondansetron (ZOFRAN) injection 4 mg  4 mg Intravenous Q6H PRN Lakisha Riley MD        pantoprazole (PROTONIX) EC tablet 40 mg  40 mg Oral Daily Lakisha Riley MD        prochlorperazine (COMPAZINE) injection 10 mg  10 mg Intravenous Q6H PRN Lakisha Riley MD        Or    prochlorperazine (COMPAZINE) tablet 10 mg  10 mg Oral Q6H PRN Lakisha Riley MD        traMADol (ULTRAM) tablet 50 mg  50 mg Oral Q6H PRN Yuniel Hutton APRN CNP            LABS:  INR   Date Value Ref Range Status   06/13/2025 1.07 0.85 - 1.15 Final   11/07/2020 0.97 0.86 - 1.14 Final      Hemoglobin   Date Value Ref Range Status   06/30/2025 13.4 11.7 - 15.7 g/dL Final   03/13/2021 10.9 (L) 11.7 - 15.7 g/dL Final     Platelet Count   Date Value Ref Range Status   06/30/2025 222 150 - 450 10e3/uL Final   03/13/2021 299 150 - 450 10e9/L Final     Creatinine   Date Value Ref Range Status   06/30/2025 0.51 0.51 - 0.95 mg/dL Final   03/13/2021 0.62 0.52 - 1.04 mg/dL Final     Potassium   Date Value Ref Range Status   06/30/2025 4.0 3.4 - 5.3 mmol/L Final   07/08/2023 4.1 3.5 - 5.0 mmol/L Final   03/13/2021 3.9 3.4 - 5.3 mmol/L Final     Recent Labs   Lab Test 06/26/25  1808 05/20/25  1931 01/11/25  0602   PROTTOTAL 7.5 7.3 6.4   ALBUMIN 3.8 4.6 4.2   BILITOTAL 0.2 0.2 0.4   ALKPHOS 56 76 70   AST 28 17 14   ALT 33 41 26          EXAM/PRE-SEDATION ASSESSMENT:  BP (!) 173/127 (BP Location: Left arm)   Pulse 114   Temp 98.4  F (36.9  C) (Oral)   Resp 20   Ht 1.575 m (5' 2\")   Wt 107.5 kg (237 lb)   LMP  (LMP Unknown)   SpO2 99%   BMI " 43.35 kg/m    LUNGS: normal and clear to auscultation  HEART: regular rate and rhythm and no murmurs, clicks, or gallops  Mallampati Airway Classification:  II - Faucial pillars and soft palate may be seen, but uvula is masked by the base of the tongue  Previous reaction to anesthesia/sedation:  No  Sedation plan based on assessment: Moderate (conscious) sedation  ASA Classification: Class 2 - MILD SYSTEMIC DISEASE, NO ACUTE PROBLEMS, NO FUNCTIONAL LIMITATIONS.   Code Status:  Full Code  - per chart review      IMPRESSION:  Procedure and risks reviewed with patient.  All questions answered and written consent obtained.  No change in patient's condition.  Patient is appropriate for moderate sedation.    PLAN: Proceed with procedure.        Rolly Samuel MD  Interventional Radiology

## 2025-07-01 NOTE — ED NOTES
Bed: ED02  Expected date:   Expected time:   Means of arrival:   Comments:  A500   Detail Level: Detailed Depth Of Biopsy: dermis Was A Bandage Applied: Yes Size Of Lesion In Cm: 0 Biopsy Type: H and E Biopsy Method: Dermablade Anesthesia Type: 1% lidocaine with epinephrine Anesthesia Volume In Cc (Will Not Render If 0): 0.5 Hemostasis: Drysol Wound Care: Petrolatum Dressing: bandage Destruction After The Procedure: No Type Of Destruction Used: Curettage Curettage Text: The wound bed was treated with curettage after the biopsy was performed. Cryotherapy Text: The wound bed was treated with cryotherapy after the biopsy was performed. Electrodesiccation Text: The wound bed was treated with electrodesiccation after the biopsy was performed. Electrodesiccation And Curettage Text: The wound bed was treated with electrodesiccation and curettage after the biopsy was performed. Silver Nitrate Text: The wound bed was treated with silver nitrate after the biopsy was performed. Lab: [id number] Lab Facility: [id number] Consent: Written consent was obtained and risks were reviewed including but not limited to scarring, infection, bleeding, scabbing, incomplete removal, nerve damage and allergy to anesthesia. Post-Care Instructions: I reviewed with the patient in detail post-care instructions. Patient is to keep the biopsy site dry overnight, and then apply bacitracin twice daily until healed. Patient may apply hydrogen peroxide soaks to remove any crusting. Notification Instructions: Patient will be notified of biopsy results. However, patient instructed to call the office if not contacted within 2 weeks. Billing Type: Third-Party Bill Information: Selecting Yes will display possible errors in your note based on the variables you have selected. This validation is only offered as a suggestion for you. PLEASE NOTE THAT THE VALIDATION TEXT WILL BE REMOVED WHEN YOU FINALIZE YOUR NOTE. IF YOU WANT TO FAX A PRELIMINARY NOTE YOU WILL NEED TO TOGGLE THIS TO 'NO' IF YOU DO NOT WANT IT IN YOUR FAXED NOTE.

## 2025-07-01 NOTE — ED NOTES
[facility]  ED Nurse Handoff Report    ED Chief complaint: Arm Pain  . ED Diagnosis:   Final diagnoses:   Problem with vascular access   Right-sided chest pain       Allergies:   Allergies   Allergen Reactions    Influenza Vaccines Other (See Comments) and Nausea and Vomiting     HUT Reaction Type: Intolerance    Latex Rash           Oseltamivir Hives    Penicillins Anaphylaxis    Vancomycin Itching, Swelling and Rash     Flushed face, very itchy    Hydrocodone Nausea and Vomiting and GI Disturbance     vomiting for days    Blood-Group Specific Substance Other (See Comments)     Patient has anti-Cw, anti-K (Angella), Warm auto and nonspecific antibodies. Blood products may be delayed. Draw patient 24 hours prior to transfusion. Draw one red top and two purple top tubes for all type and screen orders.    Clavulanic Acid Angioedema    Haemophilus B Polysaccharide Vaccine Nausea and Vomiting    Iodinated Contrast Media Itching and Other (See Comments)     Vaginal irritation, burning    Iodine Hives and Other (See Comments)    Oxycodone Swelling    Sulfamethoxazole Angioedema and Other (See Comments)    Trimethoprim Angioedema, Swelling and Other (See Comments)    Adhesive Tape Rash     Silicone type            Band-Aid Anti-Itch      Other reaction(s): adhesive allergy    Cephalosporins Rash    Lamotrigine Rash     Possibly associated with Lamictal.     Naltrexone Other (See Comments)     Reaction(s): Vivid dreams.       Code Status: Full Code    Activity level - Baseline/Home:  independent.  Activity Level - Current:   independent.   Lift room needed: No.   Bariatric: No   Needed: No   Isolation: No.   Infection: Not Applicable.     Respiratory status: Room air    Vital Signs (within 30 minutes):   Vitals:    06/30/25 2026 07/01/25 0020   BP: (!) 189/95 (!) 146/98   Pulse: 104 89   Resp: 18    Temp: 99.2  F (37.3  C)    TempSrc: Oral    SpO2: 98% 98%   Weight: 107.5 kg (237 lb)   "  Height: 1.575 m (5' 2\")        Cardiac Rhythm:  ,      Pain level:    Patient confused: No.   Patient Falls Risk: nonskid shoes/slippers when out of bed.   Elimination Status: Has voided     Patient Report - Initial Complaint: Arm Pain.   Focused Assessment: Per MD note \"Nevin Alvarado is a 34 year old female on Eliquis with a history of diabetes mellitus, PE, hypertension and endometriosis presenting with right arm pain that started suddenly tonight when she was watching a movie on her couch. She notes the pain started in the back of the right shoulder and progressed down her arm. Upon examination, she is developing a headache. The patient recently had a port placed, and she is concerned for a blood clot. She has a history of migraines, noting her current symptoms do not feel similar, and they typically start in the left temporal area. She has not taken any medications for pain. Nevin denies nausea, vomiting, chest pain or shortness of breath. No recent fall or injuries. \"     Abnormal Results:   Labs Ordered and Resulted from Time of ED Arrival to Time of ED Departure   BASIC METABOLIC PANEL - Abnormal       Result Value    Sodium 141      Potassium 4.0      Chloride 108 (*)     Carbon Dioxide (CO2) 24      Anion Gap 9      Urea Nitrogen 12.4      Creatinine 0.51      GFR Estimate >90      Calcium 9.5      Glucose 131 (*)    TROPONIN T, HIGH SENSITIVITY - Normal    Troponin T, High Sensitivity 7     CBC WITH PLATELETS AND DIFFERENTIAL    WBC Count 9.4      RBC Count 4.39      Hemoglobin 13.4      Hematocrit 39.0      MCV 89      MCH 30.5      MCHC 34.4      RDW 13.3      Platelet Count 222      % Neutrophils 70      % Lymphocytes 22      % Monocytes 6      % Eosinophils 1      % Basophils 0      % Immature Granulocytes 0      NRBCs per 100 WBC 0      Absolute Neutrophils 6.6      Absolute Lymphocytes 2.0      Absolute Monocytes 0.6      Absolute Eosinophils 0.1      Absolute Basophils 0.0      Absolute " Immature Granulocytes 0.0      Absolute NRBCs 0.0     ROUTINE UA WITH MICROSCOPIC REFLEX TO CULTURE        Chest XR,  PA & LAT   Final Result   Addendum (preliminary) 1 of 1   CLINICAL ADDENDUM:    Clinical information in this report has been modified from the previous    version as follows:       An addendum report is being issued after discussion with the emergency    room physician. There is difficulty accessing the patient's port. Upon    reviewing the current chest radiograph and comparing to the initial    placement study from 6/16/2025 it appears    that the right IJ chest port has flipped within the port pocket. This    explains why it is unable to be accessed.      The patient will be admitted for planned chest port revision tomorrow.      END ADDENDUM      Final   IMPRESSION: Right-sided portacatheter. Normal heart size. Lungs clear. Scoliosis.      IR Port Check Right    (Results Pending)       Treatments provided: see MAR  Family Comments: n/a  OBS brochure/video discussed/provided to patient:  Yes  ED Medications:   Medications   acetaminophen (TYLENOL) tablet 1,000 mg (1,000 mg Oral $Given 6/30/25 2217)   cyclobenzaprine (FLEXERIL) tablet 10 mg (10 mg Oral $Given 6/30/25 2218)   HYDROmorphone (DILAUDID) half-tab 1 mg (1 mg Oral $Given 6/30/25 2301)   sodium chloride 0.9 % infusion ( Intravenous $New Bag 7/1/25 0023)   HYDROmorphone (DILAUDID) injection 1 mg (1 mg Intravenous $Given 7/1/25 0022)       Drips infusing:  Yes  For the majority of the shift this patient was Green.   Interventions performed were n/a.    Sepsis treatment initiated: No    Cares/treatment/interventions/medications to be completed following ED care: pain management, NPO for IR procedure in the morning    ED Nurse Name: Ngozi Ritter RN  12:33 AM    RECEIVING UNIT ED HANDOFF REVIEW    Above ED Nurse Handoff Report was reviewed: Yes  Reviewed by: Dee Ley RN on July 1, 2025 at 2:24 AM

## 2025-07-01 NOTE — PHARMACY-ADMISSION MEDICATION HISTORY
Pharmacist Admission Medication History    Admission medication history is complete. The information provided in this note is only as accurate as the sources available at the time of the update.    Information Source(s): Patient, Hospital records, and CareEverywhere/SureScripts via in-person    Pertinent Information: reviewed fill history, discharge summary AVS from 6/19/25, and spoke with patient.   Helen has recent fill history, patient and chart notes indicate plan to restart at 2.5mg dose after surgery planned 7/14/25. Did not re-add to PTA med list at this time.     Changes made to PTA medication list:  Added: acetaminophen, hydrocortisone topical (taking in addition to rectal)   Deleted: None  Changed: ammonium lactate to PRN, clarified fleet enema PRN frequency     Allergies reviewed with patient and updates made in EHR: yes; patient noted itching after Dilaudid dose given in ED 6/30/25. She states she has tolerated this in the past - did not add to allergy list at this time.     Medication History Completed By: Sandra Lowery [facility] 7/1/2025 8:41 AM    PTA Med List   Medication Sig Note Last Dose/Taking    acetaminophen (TYLENOL) 500 MG tablet Take 500-1,000 mg by mouth every 6 hours as needed for mild pain.  Unknown    albuterol (PROVENTIL) (2.5 MG/3ML) 0.083% neb solution Take 1 vial (2.5 mg) by nebulization every 6 hours as needed for shortness of breath or wheezing  Unknown    amitriptyline (ELAVIL) 75 MG tablet Take 1 tablet (75 mg) by mouth at bedtime.  6/29/2025 Bedtime    ammonium lactate (AMLACTIN) 12 % external cream Apply topically 2 times daily as needed for dry skin (calluses). Apply to feet  Past Month    apixaban ANTICOAGULANT (ELIQUIS) 5 MG tablet Take 5 mg by mouth 2 times daily.  6/30/2025 Morning    busPIRone (BUSPAR) 15 MG tablet Take 15 mg by mouth 2 times daily.  6/30/2025 Morning    cetirizine (ZYRTEC) 10 MG tablet Take 10 mg by mouth 2 times daily.  6/30/2025 Morning     cholecalciferol 50 MCG (2000 UT) CAPS Take 50 mcg by mouth daily.  6/30/2025 Morning    clonazePAM (KLONOPIN) 0.5 MG tablet Take 1 tablet (0.5 mg) by mouth daily as needed for anxiety  Past Month    cyanocobalamin (VITAMIN B-12) 1000 MCG tablet Take 1,000 mcg by mouth daily.  6/30/2025 Morning    cyclobenzaprine (FLEXERIL) 5 MG tablet Take 5 mg by mouth 3 times daily as needed for muscle spasms.  Unknown    docusate sodium (COLACE) 100 MG capsule Take 100 mg by mouth daily.  6/30/2025 Morning    ferrous sulfate (FEROSUL) 325 (65 Fe) MG tablet Take 1 tablet (325 mg) by mouth daily (with breakfast)  6/30/2025 Morning    hydrocortisone 2.5 % cream Apply topically 2 times daily as needed for other (Apply to face). 7/1/2025: Prescribed on M-F, off weekends (BID dosing) - patient takes PRN Unknown    hydrocortisone, Perianal, (ANUSOL-HC) 2.5 % cream Place rectally 2 times daily as needed for hemorrhoids.  Unknown    hydroxychloroquine (PLAQUENIL) 200 MG tablet Take 200 mg by mouth 2 times daily.  6/30/2025 Morning    hydrOXYzine HCl (ATARAX) 25 MG tablet Take 25 mg by mouth every 8 hours as needed for anxiety.  Past Week    lidocaine, Anorectal, 5 % CREA Externally apply topically daily as needed.  Unknown    metoclopramide (REGLAN) 5 MG tablet Take 5 mg by mouth 4 times daily as needed for vomiting.  Unknown    naloxone (NARCAN) 4 MG/0.1ML nasal spray Spray 1 spray (4 mg) into one nostril alternating nostrils as needed for opioid reversal. every 2-3 minutes until assistance arrives  Unknown    norethindrone (AYGESTIN) 5 MG tablet Take 5 mg by mouth daily  6/30/2025 Morning    nystatin (MYCOSTATIN) 710590 UNIT/GM external cream Apply topically 2 times daily as needed for dry skin.  More than a month    ondansetron (ZOFRAN ODT) 4 MG ODT tab Take 4 mg by mouth every 8 hours as needed for nausea.  Past Week    pantoprazole (PROTONIX) 40 MG EC tablet Take 40 mg by mouth daily.  6/30/2025 Morning    polyethylene glycol  (MIRALAX) 17 GM/Dose powder Take 17 g by mouth daily as needed for constipation  More than a month    pregabalin (LYRICA) 100 MG capsule Take 1 capsule (100 mg) by mouth daily (with lunch).  6/30/2025 Noon    pregabalin (LYRICA) 100 MG capsule Take 200 mg by mouth every evening.  6/29/2025 Evening    pregabalin (LYRICA) 100 MG capsule Take 100 mg by mouth every morning. In addition to evening dose  6/30/2025 Morning    Prenatal Vit-Fe Fumarate-FA (PRENATAL MULTIVITAMIN W/IRON) 27-0.8 MG tablet Take 1 tablet by mouth daily.  6/30/2025 Morning    sennosides (SENOKOT) 8.6 MG tablet Take 1 tablet by mouth 2 times daily as needed for constipation.  Unknown    Sodium Phosphates (FLEET ENEMA) ENEM Place 1 enema rectally daily as needed (constipation).  Unknown

## 2025-07-01 NOTE — PROCEDURES
Interventional Radiology Post-Procedure Note    Procedure: Port revision.    Attending: Rolly Samuel MD    Findings: Revision of the indwelling port with retention sutures placed.    Plan: Port is okay use.

## 2025-07-01 NOTE — PLAN OF CARE
ROOM # 206-1    Living Situation Home alone  : Sister    Activity level at baseline: Ind w/walker/scooter  Activity level on admit: Ax1    Who will be transporting you at discharge: Family    Patient registered to observation; given Patient Bill of Rights; given the opportunity to ask questions about observation status and their plan of care.  Patient has been oriented to the observation room, bathroom and call light is in place.    Discussed discharge goals and expectations with patient/family.

## 2025-07-01 NOTE — H&P
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 daily. Apply to feet   apixaban ANTICOAGULANT (ELIQUIS) 5 MG tablet   Yes No   Sig: Take 5 mg by mouth 2 times daily.   busPIRone (BUSPAR) 15 MG tablet   Yes No   Sig: Take 15 mg by mouth 2 times daily.   cetirizine (ZYRTEC) 10 MG tablet   Yes  No   Sig: Take 10 mg by mouth 2 times daily.   cholecalciferol 50 MCG (2000 UT) CAPS   Yes No   Sig: Take 50 mcg by mouth daily.   clonazePAM (KLONOPIN) 0.5 MG tablet   No No   Sig: Take 1 tablet (0.5 mg) by mouth daily as needed for anxiety   cyanocobalamin (VITAMIN B-12) 1000 MCG tablet   Yes No   Sig: Take 1,000 mcg by mouth daily.   cyclobenzaprine (FLEXERIL) 5 MG tablet   Yes No   Sig: Take 5 mg by mouth 3 times daily as needed for muscle spasms.   docusate sodium (COLACE) 100 MG capsule   Yes No   Sig: Take 100 mg by mouth daily.   ferrous sulfate (FEROSUL) 325 (65 Fe) MG tablet  Self No No   Sig: Take 1 tablet (325 mg) by mouth daily (with breakfast)   hydrOXYzine HCl (ATARAX) 25 MG tablet   Yes No   Sig: Take 25 mg by mouth every 8 hours as needed for anxiety.   hydrocortisone, Perianal, (ANUSOL-HC) 2.5 % cream   Yes No   Sig: Place rectally 2 times daily as needed for hemorrhoids.   hydroxychloroquine (PLAQUENIL) 200 MG tablet   Yes No   Sig: Take 200 mg by mouth 2 times daily.   lidocaine, Anorectal, 5 % CREA   Yes No   Sig: Externally apply topically daily as needed.   metoclopramide (REGLAN) 5 MG tablet   Yes No   Sig: Take 5 mg by mouth 4 times daily as needed for vomiting.   naloxone (NARCAN) 4 MG/0.1ML nasal spray   No No   Sig: Spray 1 spray (4 mg) into one nostril alternating nostrils as needed for opioid reversal. every 2-3 minutes until assistance arrives   norethindrone (AYGESTIN) 5 MG tablet   Yes No   Sig: Take 5 mg by mouth daily   nystatin (MYCOSTATIN) 112349 UNIT/GM external cream   No No   Sig: Apply topically 2 times daily as needed for dry skin.   ondansetron (ZOFRAN ODT) 4 MG ODT tab   Yes No   Sig: Take 4 mg by mouth every 8 hours as needed for nausea.   pantoprazole (PROTONIX) 40 MG EC tablet   Yes No   Sig: Take 40 mg by mouth daily.   polyethylene glycol (MIRALAX) 17 GM/Dose powder   Yes No   Sig: Take 17 g by mouth daily as needed for constipation   pregabalin (LYRICA) 100 MG  capsule   Yes No   Sig: Take 100 mg by mouth every morning. In addition to evening dose   pregabalin (LYRICA) 100 MG capsule   Yes No   Sig: Take 200 mg by mouth every evening.   pregabalin (LYRICA) 100 MG capsule   No No   Sig: Take 1 capsule (100 mg) by mouth daily (with lunch).   sennosides (SENOKOT) 8.6 MG tablet   Yes No   Sig: Take 1 tablet by mouth 2 times daily as needed for constipation.      Facility-Administered Medications: None     Allergies   Allergies   Allergen Reactions    Influenza Vaccines Other (See Comments) and Nausea and Vomiting     HUT Reaction Type: Intolerance    Latex Rash           Oseltamivir Hives    Penicillins Anaphylaxis    Vancomycin Itching, Swelling and Rash     Flushed face, very itchy    Hydrocodone Nausea and Vomiting and GI Disturbance     vomiting for days    Blood-Group Specific Substance Other (See Comments)     Patient has anti-Cw, anti-K (Angella), Warm auto and nonspecific antibodies. Blood products may be delayed. Draw patient 24 hours prior to transfusion. Draw one red top and two purple top tubes for all type and screen orders.    Clavulanic Acid Angioedema    Haemophilus B Polysaccharide Vaccine Nausea and Vomiting    Iodinated Contrast Media Itching and Other (See Comments)     Vaginal irritation, burning    Iodine Hives and Other (See Comments)    Oxycodone Swelling    Sulfamethoxazole Angioedema and Other (See Comments)    Trimethoprim Angioedema, Swelling and Other (See Comments)    Adhesive Tape Rash     Silicone type            Band-Aid Anti-Itch      Other reaction(s): adhesive allergy    Cephalosporins Rash    Lamotrigine Rash     Possibly associated with Lamictal.     Naltrexone Other (See Comments)     Reaction(s): Vivid dreams.       Social History   Social History     Tobacco Use    Smoking status: Never    Smokeless tobacco: Never   Substance Use Topics    Alcohol use: No     Alcohol/week: 0.0 standard drinks of alcohol     Social History     Social  History Narrative    Single.    Living in independent living portion of [employer] [facility].    On disability.    No regular exercise.      Lives alone.  Denies smoking or use of alcohol    Family History   I have reviewed this patient's family history and updated it with pertinent information if needed.   Family History   Problem Relation Age of Onset    Diabetes Type 2  Maternal Grandmother     Diabetes Type 2  Paternal Grandmother     Breast Cancer Paternal Grandmother     Cerebrovascular Disease Father 53    Myocardial Infarction No family hx of     Coronary Artery Disease Early Onset No family hx of     Ovarian Cancer No family hx of     Colon Cancer No family hx of     Depression Mother     Anxiety Disorder Mother          Review of Systems   A Comprehensive greater than 10 system review of systems was carried out.  Pertinent positives and negatives are noted above.  Otherwise negative for contributory information.    Physical Exam   Temp: 99.2  F (37.3  C) Temp src: Oral BP: (!) 189/95 Pulse: 104   Resp: 18 SpO2: 98 % O2 Device: None (Room air)    Vital Signs with Ranges  Temp:  [99.2  F (37.3  C)] 99.2  F (37.3  C)  Pulse:  [104] 104  Resp:  [18] 18  BP: (189)/(95) 189/95  SpO2:  [98 %] 98 %  237 lbs 0 oz    GEN:  Alert, oriented x 3, appears comfortable, no overt distress  HEENT:  Normocephalic/atraumatic, no scleral icterus, no nasal discharge, mouth moist.  CV:  Regular rate and rhythm, no murmur or JVD.  S1 + S2 noted, no S3 or S4.  LUNGS:  Clear to auscultation bilaterally without rales/rhonchi/wheezing/retractions.  Symmetric chest rise on inhalation noted.  ABD:  Active bowel sounds, soft, non-tender/non-distended.  No rebound/guarding/rigidity.  EXT:  No edema  SKIN:  Dry to touch, no exanthems noted in the visualized areas.  NEURO: Awake alert oriented x 3 moving all extremities.    Data   Data reviewed today:  I personally reviewed the chest x-ray image(s) showing port in place.    Recent Labs  "  Lab 06/30/25 2207 06/26/25  1808   WBC 9.4 8.5   HGB 13.4 13.9   HCT 39.0 41.9   MCV 89 91    203     Recent Labs   Lab 06/30/25 2207 06/26/25  1808    140   POTASSIUM 4.0 4.0   CHLORIDE 108* 105   CO2 24 24   ANIONGAP 9 11   * 108*   BUN 12.4 14.6   CR 0.51 0.55   GFRESTIMATED >90 >90   KELBY 9.5 8.9     7-Day Micro Results       No results found for the last 168 hours.          Recent Labs   Lab 06/30/25 2207 06/26/25  1808   HGB 13.4 13.9     Recent Labs   Lab 06/26/25  1808   AST 28   ALT 33   ALKPHOS 56   BILITOTAL 0.2     No results for input(s): \"INR\" in the last 168 hours.  No results for input(s): \"LACT\" in the last 168 hours.  No results for input(s): \"LIPASE\" in the last 168 hours.  No results for input(s): \"TSH\" in the last 168 hours.  No results for input(s): \"TROPONIN\", \"TROPI\", \"TROPR\", \"TROPONINIS\" in the last 168 hours.    Invalid input(s): \"TROPT\", \"TROP\", \"TROPONINIES\", \"TNIH\"  No results for input(s): \"COLOR\", \"APPEARANCE\", \"URINEGLC\", \"URINEBILI\", \"URINEKETONE\", \"SG\", \"UBLD\", \"URINEPH\", \"PROTEIN\", \"UROBILINOGEN\", \"NITRITE\", \"LEUKEST\", \"RBCU\", \"WBCU\" in the last 168 hours.    Recent Results (from the past 24 hours)   Chest XR,  PA & LAT    Addendum: 6/30/2025    CLINICAL ADDENDUM:   Clinical information in this report has been modified from the previous version as follows:     An addendum report is being issued after discussion with the emergency room physician. There is difficulty accessing the patient's port. Upon reviewing the current chest radiograph and comparing to the initial placement study from 6/16/2025 it appears   that the right IJ chest port has flipped within the port pocket. This explains why it is unable to be accessed.    The patient will be admitted for planned chest port revision tomorrow.    END ADDENDUM      Narrative    EXAM: XR CHEST 2 VIEWS  LOCATION: [facility]  DATE: 6/30/2025    INDICATION: R chest pain  COMPARISON: " 6/26/2025      Impression    IMPRESSION: Right-sided portacatheter. Normal heart size. Lungs clear. Scoliosis.
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
## 2025-07-01 NOTE — PLAN OF CARE
PRIMARY DIAGNOSIS: port flipped  OUTPATIENT/OBSERVATION GOALS TO BE MET BEFORE DISCHARGE:  1. Pain Status: Improved but still requiring IV narcotics.    2. Return to near baseline physical activity: Yes    3. Cleared for discharge by consultants (if involved): No    Discharge Planner Nurse   Safe discharge environment identified: Yes  Barriers to discharge: Yes       Entered by: Daly Gramajo RN 07/01/2025 10:01 AM     Please review provider order for any additional goals.   Nurse to notify provider when observation goals have been met and patient is ready for discharge.    Goal Outcome Evaluation:      Plan of Care Reviewed With: patient    Overall Patient Progress: no changeOverall Patient Progress: no change    Outcome Evaluation: Pt is a/ox4 however on safety check for shift change patient is anxious, restless, tearful crying and shaking saying she has a fever and was in 9/10 pain,  was assessed HTT all fine with some redness at port site,NPO since admission for IR intervenetion,Refused dilaudid atarax for pain managment, pt uses A1 GB FWW    Problem: Adult Inpatient Plan of Care  Goal: Plan of Care Review  Description: The Plan of Care Review/Shift note should be completed every shift.  The Outcome Evaluation is a brief statement about your assessment that the patient is improving, declining, or no change.  This information will be displayed automatically on your shift  note.  Outcome: Progressing  Flowsheets (Taken 7/1/2025 0953)  Outcome Evaluation: Pt is a/ox4 however on safety check for shift change patient is anxious, restless, tearful crying and shaking saying she has a fever and was in 9/10 pain,  was assessed HTT all fine with some redness at port site,NPO since admission for IR intervenetion,Refused dilaudid atarax for pain managment  Plan of Care Reviewed With: patient  Overall Patient Progress: no change  Goal: Patient-Specific Goal (Individualized)  Description: You can add care plan  "individualizations to a care plan. Examples of Individualization might be:  \"Parent requests to be called daily at 9am for status\", \"I have a hard time hearing out of my right ear\", or \"Do not touch me to wake me up as it startles  me\".  Outcome: Progressing  Goal: Absence of Hospital-Acquired Illness or Injury  Outcome: Progressing  Intervention: Identify and Manage Fall Risk  Recent Flowsheet Documentation  Taken 7/1/2025 0730 by Daly Gramajo RN  Safety Promotion/Fall Prevention: safety round/check completed  Intervention: Prevent Skin Injury  Recent Flowsheet Documentation  Taken 7/1/2025 0730 by Daly Gramajo RN  Body Position: position changed independently  Skin Protection: adhesive use limited  Intervention: Prevent and Manage VTE (Venous Thromboembolism) Risk  Recent Flowsheet Documentation  Taken 7/1/2025 0730 by Daly Gramajo RN  VTE Prevention/Management:   SCDs off (sequential compression devices)   patient refused intervention  Intervention: Prevent Infection  Recent Flowsheet Documentation  Taken 7/1/2025 0730 by Daly Gramajo RN  Infection Prevention:   cohorting utilized   equipment surfaces disinfected   hand hygiene promoted   personal protective equipment utilized   rest/sleep promoted  Goal: Optimal Comfort and Wellbeing  Outcome: Progressing  Goal: Readiness for Transition of Care  Outcome: Progressing           "

## 2025-07-01 NOTE — PLAN OF CARE
Goal Outcome Evaluation:      Plan of Care Reviewed With: patient    Overall Patient Progress: improvingOverall Patient Progress: improving    Outcome Evaluation: Pt will be discharging home today.

## 2025-07-01 NOTE — CONSULTS
Care Management Initial Consult    General Information  Assessment completed with: Patient,    Type of CM/SW Visit: Initial Assessment    Primary Care Provider verified and updated as needed:     Readmission within the last 30 days:        Reason for Consult: other (see comments) (Elevated Risk)     Communication Assessment  Patient's communication style: spoken language (English or Bilingual)    Hearing Difficulty or Deaf: no      Cognitive  Cognitive/Neuro/Behavioral: .WDL except, mood/behavior  Level of Consciousness: alert  Arousal Level: opens eyes spontaneously  Orientation: oriented x 4  Mood/Behavior: anxious     Speech: clear, spontaneous    Living Environment:   People in home: alone     Current living Arrangements: apartment      Able to return to prior arrangements: yes     Family/Social Support:  Care provided by: self, other (see comments) (S workers)  Provides care for: no one, unable/limited ability to care for self, no one    Current Resources:   Patient receiving home care services: No     Community Resources: County Worker, County Programs, Financial/Insurance  Equipment currently used at home: walker, rolling  Supplies currently used at home:      Does the patient's insurance plan have a 3 day qualifying hospital stay waiver?  Yes     Which insurance plan 3 day waiver is available? Alternative insurance waiver    Will the waiver be used for post-acute placement? No    Lifestyle & Psychosocial Needs:  Social Drivers of Health     Food Insecurity: Low Risk  (7/1/2025)    Food Insecurity     Within the past 12 months, did you worry that your food would run out before you got money to buy more?: No     Within the past 12 months, did the food you bought just not last and you didn t have money to get more?: No   Depression: At risk (3/12/2025)    Received from [facility]    PHQ-2     PHQ-2 TOTAL SCORE: 3   Housing Stability: Low Risk  (7/1/2025)    Housing Stability      Do you have housing? : Yes     Are you worried about losing your housing?: No   Tobacco Use: Low Risk  (6/27/2025)    Received from [facility]     Patient History     Smoking Tobacco Use: Never     Smokeless Tobacco Use: Never     Passive Exposure: Not on file   Financial Resource Strain: Low Risk  (7/1/2025)    Financial Resource Strain     Within the past 12 months, have you or your family members you live with been unable to get utilities (heat, electricity) when it was really needed?: No   Alcohol Use: Not on file   Transportation Needs: Low Risk  (7/1/2025)    Transportation Needs     Within the past 12 months, has lack of transportation kept you from medical appointments, getting your medicines, non-medical meetings or appointments, work, or from getting things that you need?: No   Physical Activity: Insufficiently Active (11/13/2024)    Received from [facility]    Exercise Vital Sign     Days of Exercise per Week: 3 days     Minutes of Exercise per Session: 10 min   Interpersonal Safety: Low Risk  (6/12/2025)    Interpersonal Safety     Do you feel physically and emotionally safe where you currently live?: Yes     Within the past 12 months, have you been hit, slapped, kicked or otherwise physically hurt by someone?: No     Within the past 12 months, have you been humiliated or emotionally abused in other ways by your partner or ex-partner?: No   Stress: Not on file   Social Connections: Socially Integrated (3/2/2025)    Received from [facility]    Social Connections     Do you often feel lonely or isolated from those around you?: 0   Health Literacy: Not on file       Functional Status:  Prior to admission patient needed assistance:   Dependent ADLs:: Independent  Dependent IADLs:: Cleaning, Cooking, Laundry, Shopping, Meal Preparation, Money Management, Transportation        Discussed  Partnership in Safe Discharge Planning  document with patient/family:  No    Care Management Discharge Note    Discharge Date: 07/01/2025       Discharge Disposition: Home    Discharge DME: None    Discharge Transportation: public transportation    Private pay costs discussed: Not applicable    Does the patient's insurance plan have a 3 day qualifying hospital stay waiver?  Yes     Which insurance plan 3 day waiver is available? Alternative insurance waiver    Will the waiver be used for post-acute placement? No    Patient/family educated on Medicare website which has current facility and service quality ratings: no    Education Provided on the Discharge Plan: Yes  Persons Notified of Discharge Plans: Pt, charge nurse, bedside nurse  Patient/Family in Agreement with the Plan: yes    Handoff Referral Completed: No, handoff not indicated or clinically appropriate    Additional Information:  CM/SW consulted due to elevated unplanned readmission risk score of 45%. Pt admitted for revision of Port. Met with pt at bedside. Pt lives alone in an apartment. Uses a walker as needed. Pt denies needs for any additional equipment in the home. Bathroom remodel is scheduled for next week. Pt has a CADI waiver, [facility] worker, [facility], [facility], SNAP, rent assistance. Pt has a Guardian, they are authorized for housing decisions only, otherwise pt makes all of her other decisions.   Pt will be discharging home today. She states she went home by wheelchair last admission. Pt does not have a medical need for wheelchair transportation. Pt does not have anyone that can provide a ride home. Spoke with [employer], cab ride was approved. Spoke with charge nurse, she will call for a cab when pt is discharge ready.  Please call if additional needs arise.    Yoana Monroy RN   Inpatient Care Coordination  [facility]   Phone: [phone]

## 2025-07-01 NOTE — PROGRESS NOTES
[facility]    Medicine Progress Note - Hospitalist Service    Date of Admission:  6/30/2025    Assessment & Plan   Nevin Alvarado is a 34 year old female with past medical history significant for pulmonary embolism on apixaban.  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  In the emergency room evaluation showed that her port is flipped and could not be accessed.  IR was contacted and recommended revision of port.  Patient is being admitted for IR procedure in a.m.        Malfunctioning port  Localized pain  - IR planning port revision this am.  - Scheduled and PRN APAP, PRN Tramadol.  IV Morphine for breakthrough pain.   - N.p.o. for procedure.     Chronic inflammatory demyelinating polyneuropathy  - Followed by neurology scheduled for intermittent IVIG infusions     Diabetes mellitus . A1C 5.8  - Diabetic diet  - Monitor.       Pulmonary embolism  - Patient on apixaban last evening's Eliquis was held, procedure 7/1/2025.  - Resume Eliquis after the procedure     Sleep apnea  - Unclear if she needs to continue using CPAP per patient  - She is due for sleep study  - Monitor oxygenation     Anxiety depression  - Continue prior to admission meds once reconciled       Observation Goals: -diagnostic tests and consults completed and resulted, -vital signs normal or at patient baseline, Nurse to notify provider when observation goals have been met and patient is ready for discharge.  Diet: NPO for Procedure/Surgery per Anesthesia Guidelines Except for: Meds; Clear liquids before procedure/surgery: ADULT (Age GREATER than or Equal to 18 years) - Clear  "liquids 2 hours before procedure/surgery    DVT Prophylaxis: DOAC currently being held.  Hazel Catheter: Not present  Lines: None     Cardiac Monitoring: None  Code Status: Full Code      Clinically Significant Risk Factors Present on Admission          # Hyperchloremia: Highest Cl = 108 mmol/L in last 2 days, will monitor as appropriate           # Drug Induced Coagulation Defect: home medication list includes an anticoagulant medication              # Morbid Obesity: Estimated body mass index is 43.35 kg/m  as calculated from the following:    Height as of this encounter: 1.575 m (5' 2\").    Weight as of this encounter: 107.5 kg (237 lb).       # Financial/Environmental Concerns:           Social Drivers of Health    Depression: At risk (3/12/2025)    Received from [facility]    PHQ-2     PHQ-2 TOTAL SCORE: 3   Physical Activity: Insufficiently Active (11/13/2024)    Received from [facility]    Exercise Vital Sign     Days of Exercise per Week: 3 days     Minutes of Exercise per Session: 10 min          Disposition Plan     Medically Ready for Discharge: Anticipated Today           The patient's care was discussed with the Bedside Nurse, Care Coordinator/, Patient, and IR Team.    [facility]  Hospitalist Service  [facility]  Securely message with [employer] (more info)  Text page via [employer] Paging/Directory   ______________________________________________________________________    Interval History   Assumed care of patient.  VSS.  Mild HTN.  No CP or SOB.    Pain control an issue.  Waiting IR.    Physical Exam   Vital Signs: Temp: 98.4  F (36.9  C) Temp src: Oral BP: (!) 144/92 Pulse: 80   Resp: 18 SpO2: 99 % O2 Device: None (Room air)    Weight: 237 lbs 0 oz    GEN:   Alert, oriented x 3, appears comfortable, NAD.  NECK:   Supple ,no mass or thyromegaly   HEENT:  Normocephalic/atraumatic, no scleral icterus, no nasal discharge, " mouth moist.  CV:   Regular rate and rhythm, no murmur or JVD.  S1 + S2 noted, no S3 or S4.  LUNGS:   Clear to auscultation bilaterally without rales/rhonchi/wheezing/retractions.  Symmetric chest rise on inhalation noted.  ABD:   Active bowel sounds, soft, non-tender/non-distended.  No rebound/guarding/rigidity.  EXT:   No edema.  No cyanosis.  No joint synovitis noted.  SKIN:   Dry to touch, no exanthems noted in the visualized areas.  R upper chest port palpable and feels inverted. No cellulitic changes.  Neurologic: Grossly intact,non focal.   Neuropsychiatric:  General: normal, calm and normal eye contact  Level of consciousness: alert / normal  Affect: normal  Orientation: oriented to self, place, time and situation     Medical Decision Making       45 MINUTES SPENT BY ME on the date of service doing chart review, history, exam, documentation & further activities per the note.   Admitted overnight.  Non billable.    Data   ------------------------- PAST 24 HR DATA REVIEWED -----------------------------------------------    I have personally reviewed the following data over the past 24 hrs:    9.4  \   13.4   / 222     141 108 (H) 12.4 /  127 (H)   4.0 24 0.51 \     Trop: 7 BNP: N/A     TSH: N/A T4: N/A A1C: 5.8 (H)       Imaging results reviewed over the past 24 hrs:   Recent Results (from the past 24 hours)   Chest XR,  PA & LAT    Addendum: 6/30/2025    CLINICAL ADDENDUM:   Clinical information in this report has been modified from the previous version as follows:     An addendum report is being issued after discussion with the emergency room physician. There is difficulty accessing the patient's port. Upon reviewing the current chest radiograph and comparing to the initial placement study from 6/16/2025 it appears   that the right IJ chest port has flipped within the port pocket. This explains why it is unable to be accessed.    The patient will be admitted for planned chest port revision tomorrow.    END  ADDENDUM      Narrative    EXAM: XR CHEST 2 VIEWS  LOCATION: [facility]  DATE: 6/30/2025    INDICATION: R chest pain  COMPARISON: 6/26/2025      Impression    IMPRESSION: Right-sided portacatheter. Normal heart size. Lungs clear. Scoliosis.

## 2025-07-01 NOTE — ED NOTES
Attempted to access port x2. Able to flush but not get blood return. Two other RNs attempted to access port without any success. MD notified

## 2025-07-01 NOTE — ED NOTES
Bed: ED21  Expected date: 6/30/25  Expected time: 11:53 PM  Means of arrival:   Comments:  2 for Karlee

## 2025-07-01 NOTE — IR NOTE
Procedure: Port Revision    4 mg Versed  100 mcg Fentanyl  900 Clindamycin  26 min sedation    9 ml Lidocaine  9 ml Lidocaine w/ Epinephrine    0.1 min fluoro  3 mGy

## 2025-07-01 NOTE — PROGRESS NOTES
Post Procedure Summary:  Prior to the start of the procedure and with procedural staff participation, I verbally confirmed the patient s identity using two indicators, relevant allergies, that the procedure was appropriate and matched the consent or emergent situation, and that the correct equipment/implants were available. Immediately prior to starting the procedure I conducted the Time Out with the procedural staff and re-confirmed the patient s name, procedure, and site/side. (The Joint Commission universal protocol was followed.)  Yes       Sedatives: Fentanyl and Midazolam (Versed)    Vital signs, airway and pulse oximetry were monitored and remained stable throughout the procedure and sedation was maintained until the procedure was complete.  The patient was monitored by staff until sedation discharge criteria were met.    Patient tolerance: Patient tolerated the procedure well with no immediate complications.    Time of sedation in minutes: 26 minutes from beginning to end of physician one to one monitoring.    4 mg midazolam and 100mcg Fentanyl given during case. Report given to bedside RN

## 2025-07-02 ENCOUNTER — TRANSCRIBE ORDERS (OUTPATIENT)
Dept: OTHER | Age: 35
End: 2025-07-02

## 2025-07-02 ENCOUNTER — TELEPHONE (OUTPATIENT)
Dept: PHARMACY | Facility: OTHER | Age: 35
End: 2025-07-02
Payer: COMMERCIAL

## 2025-07-02 DIAGNOSIS — G61.81 CHRONIC INFLAMMATORY DEMYELINATING POLYRADICULONEUROPATHY (H): Primary | ICD-10-CM

## 2025-07-02 NOTE — TELEPHONE ENCOUNTER
MTM referral from: Transitions of Care (recent hospital discharge, TCU discharge, or ED visit)    MT referral outreach attempt #1 on July 2, 2025 at 11:43 AM      Outcome: Spoke with patient declined and doesn't want any more calls from [place] she thinks it is harrassment.     Use [employer] Map nabeel 7/1 for the carrier/Plan on the flowsheet      Kayla Cruz MT

## 2025-07-07 ENCOUNTER — TELEPHONE (OUTPATIENT)
Dept: ENDOCRINOLOGY | Facility: CLINIC | Age: 35
End: 2025-07-07
Payer: COMMERCIAL

## 2025-07-07 DIAGNOSIS — E11.9 TYPE 2 DIABETES MELLITUS WITHOUT COMPLICATION, WITHOUT LONG-TERM CURRENT USE OF INSULIN (H): Primary | ICD-10-CM

## 2025-07-07 NOTE — TELEPHONE ENCOUNTER
General Call    Contacts       Contact Date/Time Type Contact Phone/Fax    07/07/2025 07:34 AM CDT Phone (Incoming) Nevin Alvarado (Self) [phone] (M)          Reason for Call:  Please call pt to discuss restarting meds.. she did not have the surgery she was planning on.        Could we send this information to you in [place] or would you prefer to receive a phone call?:   Patient would prefer a phone call   Okay to leave a detailed message?: Yes at Cell number on file:    Telephone Information:   Mobile [phone]

## 2025-07-07 NOTE — TELEPHONE ENCOUNTER
Patient would like to restart her Mounjaro. She cancelled her foot surgery.  Wants Sharon to know she is not constipated. Not sure if this is because she in menstruating or due to her being off Mounjaro. Last injection was 5 mg dose on on June 1.     Will send message to Sharon Toro CNP to advise.

## 2025-07-08 RX ORDER — TIRZEPATIDE 2.5 MG/.5ML
2.5 INJECTION, SOLUTION SUBCUTANEOUS
Qty: 2 ML | Refills: 0 | Status: SHIPPED | OUTPATIENT
Start: 2025-07-08

## 2025-07-14 NOTE — ED NOTES
Patient states that she cannot contract for safety   [FreeTextEntry1] : December 8, 2023: This patient was seen with nurse practitioner Hoa:  This is a 63-year-old male here today for follow-up cardiac evaluation.  He has a past medical history significant for mitral valve regurgitation, occasional palpitations, coronary artery calcification.   HPI: Today he is feeling generally well and does not have any complaints at this time. Denies chest pain, palpitations, dizziness, shortness of breath, and syncope.   He remains on Rosuvastatin 10mg PO DAILY for primary prevention since he did have a CA score of 38.   -He states that he stays active by biking multiple times a week, golf and he also hikes and goes hunting.   -Cardiac risk factors -The patient reports that his sister at age 61 had a myocardial infarction requiring 3 stents.    BLOOD PRESSURE: -BP is well controlled in today's visit.    BLOOD WORK: -New blood work was done 12/08/2023 to evaluate lipid profile, CBC, BMP, hepatic function, A1C and TSH. -New blood work done 04/12/2023 demonstrated cholesterol 139, triglycerides 41, HDL 91, LDL 40, LDL direct 41  -Blood work done October 12, 2022 demonstrated labs WDL. -New blood work was done today, 04/05/2022 demonstrated labs WDL. -Blood work was done by his PCP on 9/3/2021 which demonstrated normal lipid profile and LDL of 37.  -He remains on Rosuvastatin 10mg PO DAILY.   TESTING: -Echocardiogram done 12/08/2023 in office with results pending.   -EKG done 12/08/2023 which demonstrated sinus bradycardia with nonspecific ST-T wave changes BPM of 59.  -EKG done 04/12/2023 which demonstrated regular sinus rhythm with nonspecific ST-T wave changes BPM of 63.  -The patient had a normal exercise stress test October 12, 2022.  -Echocardiogram done October 14, 2022 demonstrated mildly dilated left atrium, prolapse of the posterior mitral leaflet, moderate mitral valve regurgitation, trace pulmonic regurgitation, mild tricuspid regurgitation with normal left ventricular systolic function ejection fraction 70%.  -EKG done 04/05/2022 which demonstrated regular sinus rhythm with nonspecific ST-T wave changes BPM of 66 with PVC. He denies palpitations at this time.  -The patient had a normal exercise stress test December 22, 2020.  -Echocardiogram done on 12/22/2020 demonstrated MVP with mild mitral, tricuspid and pulmonic regurgitation with normal left ventricular systolic function.  -The patient had a coronary artery calcium score done January 6, 2021 which demonstrated 38 units in the left anterior descending artery and none in the remaining arteries and was started on Rosuvastatin 20mg PO DAILY.    PLAN: -The patient is clinically stable from a cardiac standpoint on today's exam.  -He will continue with his usual medications and will contact the office if he is having any complaints between now and their next follow up appointment.  -Follow up 4-6 months.   The patient understands that aerobic exercises must be increased to ~40 minutes 4 times/week and a detailed discussion of lifestyle modification was done today.  The patient has a good understanding of the diagnosis, treatment plan and lifestyle modification.  He will contact me at the office for any questions with their care or any changes in their health status.  The patient was discussed with supervising physician, Dr. Rivera while present in the office at the time of the visit.  MAUDE Camara NP

## 2025-07-16 NOTE — TELEPHONE ENCOUNTER
Patient has seen Dr. Martinez and Dr. Cleveland in the past. Patient saw Dr. Martinez in July of 2023. Recommendations are in her note. Patient has not been able to get Gabapentin Cream due to difficulties with prior authorization according to patient. Lidocaine cream and patches are not helping according to patient. I asked if she is working with primary care re: B12 level and supplementation. Patient has not yet done that. Patient says she cannot go to [facility] since they have said they cannot help her. She has an appt with  Neuroscience end of September. Feels that her symptoms are ,much worse. Went to the ER about 2 weeks ago (8/17) and was there for > 24 hours. I was discussing B12 supplementation and other recommendations from her last visit when call was cut off. I called patient right back - no . Left a message to call back with any needs. Initial call 11 minutes - ended by cut off.      8 to 22 minutes

## 2025-07-21 ENCOUNTER — VIRTUAL VISIT (OUTPATIENT)
Dept: CONSULT | Facility: CLINIC | Age: 35
End: 2025-07-21
Attending: GENETIC COUNSELOR, MS
Payer: COMMERCIAL

## 2025-07-21 DIAGNOSIS — G61.81 CHRONIC INFLAMMATORY DEMYELINATING POLYRADICULONEUROPATHY (H): ICD-10-CM

## 2025-07-21 DIAGNOSIS — Z13.71 ENCOUNTER FOR NONPROCREATIVE GENETIC COUNSELING AND TESTING: Primary | ICD-10-CM

## 2025-07-21 DIAGNOSIS — Z71.83 ENCOUNTER FOR NONPROCREATIVE GENETIC COUNSELING AND TESTING: Primary | ICD-10-CM

## 2025-07-21 PROCEDURE — 96041 GENETIC COUNSELING SVC EA 30: CPT | Mod: GT,95 | Performed by: GENETIC COUNSELOR, MS
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
## 2025-07-29 ENCOUNTER — LAB REQUISITION (OUTPATIENT)
Dept: LAB | Facility: CLINIC | Age: 35
End: 2025-07-29
Payer: COMMERCIAL

## 2025-07-29 DIAGNOSIS — J34.89 OTHER SPECIFIED DISORDERS OF NOSE AND NASAL SINUSES: ICD-10-CM

## 2025-07-29 DIAGNOSIS — R09.82 POSTNASAL DRIP: ICD-10-CM

## 2025-07-29 DIAGNOSIS — J34.3 HYPERTROPHY OF NASAL TURBINATES: ICD-10-CM

## 2025-07-30 LAB
A ALTERNATA IGE QN: <0.1 KU(A)/L
A FUMIGATUS IGE QN: <0.1 KU(A)/L
AMER ROACH IGE QN: <0.1 KU(A)/L
BERMUDA GRASS IGE QN: <0.1 KU(A)/L
C HERBARUM IGE QN: <0.1 KU(A)/L
CAT DANDER IGG QN: <0.1 KU(A)/L
CEDAR IGE QN: <0.1 KU(A)/L
COMMON RAGWEED IGE QN: <0.1 KU(A)/L
COTTONWOOD IGE QN: <0.1 KU(A)/L
D FARINAE IGE QN: <0.1 KU(A)/L
D PTERONYSS IGE QN: <0.1 KU(A)/L
DOG DANDER+EPITH IGE QN: <0.1 KU(A)/L
EAST WHITE PINE IGE QN: <0.1 KU(A)/L
GIANT RAGWEED IGE QN: <0.1 KU(A)/L
JOHNSON GRASS IGE QN: <0.1 KU(A)/L
MAPLE IGE QN: <0.1 KU(A)/L
MARSH ELDER IGE QN: <0.1 KU(A)/L
MUGWORT IGE QN: <0.1 KU(A)/L
NETTLE IGE QN: <0.1 KU(A)/L
S ROSTRATA IGE QN: <0.1 KU(A)/L
SALTWORT IGE QN: <0.1 KU(A)/L
SILVER BIRCH IGE QN: <0.1 KU(A)/L
TIMOTHY IGE QN: <0.1 KU(A)/L
WHITE ASH IGE QN: <0.1 KU(A)/L
WHITE ELM IGE QN: <0.1 KU(A)/L
WHITE MULBERRY IGE QN: <0.1 KU(A)/L
WHITE OAK IGE QN: <0.1 KU(A)/L

## 2025-07-31 LAB — DEPRECATED IGE QN: <0.1 KU(A)/L

## 2025-08-05 ENCOUNTER — HOSPITAL ENCOUNTER (EMERGENCY)
Facility: CLINIC | Age: 35
Discharge: HOME | End: 2025-08-05
Attending: EMERGENCY MEDICINE | Admitting: EMERGENCY MEDICINE
Payer: COMMERCIAL

## 2025-08-05 VITALS
RESPIRATION RATE: 18 BRPM | SYSTOLIC BLOOD PRESSURE: 148 MMHG | OXYGEN SATURATION: 97 % | DIASTOLIC BLOOD PRESSURE: 90 MMHG | TEMPERATURE: 97.8 F | HEART RATE: 86 BPM

## 2025-08-05 DIAGNOSIS — R51.9 ACUTE NONINTRACTABLE HEADACHE, UNSPECIFIED HEADACHE TYPE: Primary | ICD-10-CM

## 2025-08-05 LAB
ANION GAP SERPL CALCULATED.3IONS-SCNC: 11 MMOL/L (ref 7–15)
BASOPHILS # BLD AUTO: 0 10E3/UL (ref 0–0.2)
BASOPHILS NFR BLD AUTO: 0 %
BUN SERPL-MCNC: 13.6 MG/DL (ref 6–20)
CALCIUM SERPL-MCNC: 9.2 MG/DL (ref 8.8–10.4)
CHLORIDE SERPL-SCNC: 103 MMOL/L (ref 98–107)
CREAT SERPL-MCNC: 0.59 MG/DL (ref 0.51–0.95)
EGFRCR SERPLBLD CKD-EPI 2021: >90 ML/MIN/1.73M2
EOSINOPHIL # BLD AUTO: 0 10E3/UL (ref 0–0.7)
EOSINOPHIL NFR BLD AUTO: 0 %
ERYTHROCYTE [DISTWIDTH] IN BLOOD BY AUTOMATED COUNT: 13.1 % (ref 10–15)
GLUCOSE SERPL-MCNC: 134 MG/DL (ref 70–99)
HCG SERPL QL: NEGATIVE
HCO3 SERPL-SCNC: 24 MMOL/L (ref 22–29)
HCT VFR BLD AUTO: 42 % (ref 35–47)
HGB BLD-MCNC: 14.3 G/DL (ref 11.7–15.7)
HOLD SPECIMEN: NORMAL
IMM GRANULOCYTES # BLD: 0.1 10E3/UL
IMM GRANULOCYTES NFR BLD: 1 %
LYMPHOCYTES # BLD AUTO: 2.6 10E3/UL (ref 0.8–5.3)
LYMPHOCYTES NFR BLD AUTO: 16 %
MCH RBC QN AUTO: 30.8 PG (ref 26.5–33)
MCHC RBC AUTO-ENTMCNC: 34 G/DL (ref 31.5–36.5)
MCV RBC AUTO: 90 FL (ref 78–100)
MONOCYTES # BLD AUTO: 0.9 10E3/UL (ref 0–1.3)
MONOCYTES NFR BLD AUTO: 6 %
NEUTROPHILS # BLD AUTO: 12.9 10E3/UL (ref 1.6–8.3)
NEUTROPHILS NFR BLD AUTO: 78 %
NRBC # BLD AUTO: 0 10E3/UL
NRBC BLD AUTO-RTO: 0 /100
PLATELET # BLD AUTO: 349 10E3/UL (ref 150–450)
POTASSIUM SERPL-SCNC: 3.9 MMOL/L (ref 3.4–5.3)
RBC # BLD AUTO: 4.65 10E6/UL (ref 3.8–5.2)
SODIUM SERPL-SCNC: 138 MMOL/L (ref 135–145)
WBC # BLD AUTO: 16.5 10E3/UL (ref 4–11)

## 2025-08-05 PROCEDURE — 96365 THER/PROPH/DIAG IV INF INIT: CPT

## 2025-08-05 PROCEDURE — 250N000012 HC RX MED GY IP 250 OP 636 PS 637: Performed by: EMERGENCY MEDICINE

## 2025-08-05 PROCEDURE — 258N000003 HC RX IP 258 OP 636: Performed by: EMERGENCY MEDICINE

## 2025-08-05 PROCEDURE — 96375 TX/PRO/DX INJ NEW DRUG ADDON: CPT

## 2025-08-05 PROCEDURE — 96361 HYDRATE IV INFUSION ADD-ON: CPT

## 2025-08-05 PROCEDURE — 99284 EMERGENCY DEPT VISIT MOD MDM: CPT | Mod: 25 | Performed by: EMERGENCY MEDICINE

## 2025-08-05 PROCEDURE — 96372 THER/PROPH/DIAG INJ SC/IM: CPT | Mod: 59 | Performed by: EMERGENCY MEDICINE

## 2025-08-05 PROCEDURE — 84703 CHORIONIC GONADOTROPIN ASSAY: CPT | Performed by: EMERGENCY MEDICINE

## 2025-08-05 PROCEDURE — 36415 COLL VENOUS BLD VENIPUNCTURE: CPT | Performed by: EMERGENCY MEDICINE

## 2025-08-05 PROCEDURE — 85004 AUTOMATED DIFF WBC COUNT: CPT | Performed by: EMERGENCY MEDICINE

## 2025-08-05 PROCEDURE — 80048 BASIC METABOLIC PNL TOTAL CA: CPT | Performed by: EMERGENCY MEDICINE

## 2025-08-05 PROCEDURE — 250N000011 HC RX IP 250 OP 636: Performed by: EMERGENCY MEDICINE

## 2025-08-05 RX ORDER — MAGNESIUM SULFATE HEPTAHYDRATE 40 MG/ML
2 INJECTION, SOLUTION INTRAVENOUS ONCE
Status: COMPLETED | OUTPATIENT
Start: 2025-08-05 | End: 2025-08-05

## 2025-08-05 RX ORDER — ONDANSETRON 2 MG/ML
8 INJECTION INTRAMUSCULAR; INTRAVENOUS ONCE
Status: COMPLETED | OUTPATIENT
Start: 2025-08-05 | End: 2025-08-05

## 2025-08-05 RX ORDER — HEPARIN SODIUM (PORCINE) LOCK FLUSH IV SOLN 100 UNIT/ML 100 UNIT/ML
5-10 SOLUTION INTRAVENOUS
Status: DISCONTINUED | OUTPATIENT
Start: 2025-08-05 | End: 2025-08-05 | Stop reason: HOSPADM

## 2025-08-05 RX ORDER — DIPHENHYDRAMINE HYDROCHLORIDE 50 MG/ML
25 INJECTION, SOLUTION INTRAMUSCULAR; INTRAVENOUS ONCE
Status: COMPLETED | OUTPATIENT
Start: 2025-08-05 | End: 2025-08-05

## 2025-08-05 RX ORDER — SUMATRIPTAN 6 MG/.5ML
6 INJECTION, SOLUTION SUBCUTANEOUS ONCE
Status: COMPLETED | OUTPATIENT
Start: 2025-08-05 | End: 2025-08-05

## 2025-08-05 RX ORDER — HEPARIN SODIUM,PORCINE 10 UNIT/ML
5-10 VIAL (ML) INTRAVENOUS EVERY 24 HOURS
Status: DISCONTINUED | OUTPATIENT
Start: 2025-08-05 | End: 2025-08-05 | Stop reason: HOSPADM

## 2025-08-05 RX ORDER — SUMATRIPTAN SUCCINATE 25 MG/1
TABLET ORAL
Qty: 10 TABLET | Refills: 0 | Status: SHIPPED | OUTPATIENT
Start: 2025-08-05

## 2025-08-05 RX ORDER — HEPARIN SODIUM,PORCINE 10 UNIT/ML
5-10 VIAL (ML) INTRAVENOUS
Status: DISCONTINUED | OUTPATIENT
Start: 2025-08-05 | End: 2025-08-05 | Stop reason: HOSPADM

## 2025-08-05 RX ADMIN — ONDANSETRON 8 MG: 2 INJECTION, SOLUTION INTRAMUSCULAR; INTRAVENOUS at 19:07

## 2025-08-05 RX ADMIN — SUMATRIPTAN 6 MG: 6 INJECTION, SOLUTION SUBCUTANEOUS at 19:11

## 2025-08-05 RX ADMIN — MAGNESIUM SULFATE HEPTAHYDRATE 2 G: 40 INJECTION, SOLUTION INTRAVENOUS at 19:22

## 2025-08-05 RX ADMIN — Medication 5 ML: at 20:25

## 2025-08-05 RX ADMIN — DIPHENHYDRAMINE HYDROCHLORIDE 25 MG: 50 INJECTION, SOLUTION INTRAMUSCULAR; INTRAVENOUS at 19:06

## 2025-08-05 RX ADMIN — SODIUM CHLORIDE 1000 ML: 0.9 INJECTION, SOLUTION INTRAVENOUS at 19:05

## 2025-08-05 ASSESSMENT — ACTIVITIES OF DAILY LIVING (ADL)
ADLS_ACUITY_SCORE: 62

## 2025-08-06 ENCOUNTER — HOSPITAL ENCOUNTER (EMERGENCY)
Facility: CLINIC | Age: 35
Discharge: HOME | End: 2025-08-06
Attending: EMERGENCY MEDICINE
Payer: COMMERCIAL

## 2025-08-06 VITALS
BODY MASS INDEX: 44.18 KG/M2 | HEART RATE: 89 BPM | OXYGEN SATURATION: 97 % | RESPIRATION RATE: 18 BRPM | DIASTOLIC BLOOD PRESSURE: 107 MMHG | HEIGHT: 62 IN | SYSTOLIC BLOOD PRESSURE: 176 MMHG | TEMPERATURE: 97.5 F | WEIGHT: 240.08 LBS

## 2025-08-06 DIAGNOSIS — G43.809 OTHER MIGRAINE WITHOUT STATUS MIGRAINOSUS, NOT INTRACTABLE: Primary | ICD-10-CM

## 2025-08-06 DIAGNOSIS — G43.709 NON-REFRACTORY CHRONIC MIGRAINE WITHOUT AURA: ICD-10-CM

## 2025-08-06 PROCEDURE — 250N000012 HC RX MED GY IP 250 OP 636 PS 637: Performed by: EMERGENCY MEDICINE

## 2025-08-06 PROCEDURE — 258N000003 HC RX IP 258 OP 636: Performed by: EMERGENCY MEDICINE

## 2025-08-06 PROCEDURE — 96366 THER/PROPH/DIAG IV INF ADDON: CPT

## 2025-08-06 PROCEDURE — 99291 CRITICAL CARE FIRST HOUR: CPT | Mod: 25 | Performed by: EMERGENCY MEDICINE

## 2025-08-06 PROCEDURE — 99284 EMERGENCY DEPT VISIT MOD MDM: CPT | Mod: 25 | Performed by: EMERGENCY MEDICINE

## 2025-08-06 PROCEDURE — 96372 THER/PROPH/DIAG INJ SC/IM: CPT | Mod: XU | Performed by: EMERGENCY MEDICINE

## 2025-08-06 PROCEDURE — 96375 TX/PRO/DX INJ NEW DRUG ADDON: CPT

## 2025-08-06 PROCEDURE — 96365 THER/PROPH/DIAG IV INF INIT: CPT

## 2025-08-06 PROCEDURE — 250N000011 HC RX IP 250 OP 636: Performed by: EMERGENCY MEDICINE

## 2025-08-06 RX ORDER — DEXAMETHASONE SODIUM PHOSPHATE 10 MG/ML
10 INJECTION, SOLUTION INTRAMUSCULAR; INTRAVENOUS ONCE
Status: COMPLETED | OUTPATIENT
Start: 2025-08-06 | End: 2025-08-06

## 2025-08-06 RX ORDER — MAGNESIUM SULFATE HEPTAHYDRATE 40 MG/ML
2 INJECTION, SOLUTION INTRAVENOUS ONCE
Status: COMPLETED | OUTPATIENT
Start: 2025-08-06 | End: 2025-08-06

## 2025-08-06 RX ORDER — KETOROLAC TROMETHAMINE 15 MG/ML
15 INJECTION, SOLUTION INTRAMUSCULAR; INTRAVENOUS ONCE
Status: COMPLETED | OUTPATIENT
Start: 2025-08-06 | End: 2025-08-06

## 2025-08-06 RX ORDER — SUMATRIPTAN 6 MG/.5ML
3 INJECTION, SOLUTION SUBCUTANEOUS ONCE
Status: COMPLETED | OUTPATIENT
Start: 2025-08-06 | End: 2025-08-06

## 2025-08-06 RX ORDER — DIPHENHYDRAMINE HYDROCHLORIDE 50 MG/ML
25 INJECTION, SOLUTION INTRAMUSCULAR; INTRAVENOUS ONCE
Status: COMPLETED | OUTPATIENT
Start: 2025-08-06 | End: 2025-08-06

## 2025-08-06 RX ORDER — ONDANSETRON 2 MG/ML
4 INJECTION INTRAMUSCULAR; INTRAVENOUS ONCE
Status: COMPLETED | OUTPATIENT
Start: 2025-08-06 | End: 2025-08-06

## 2025-08-06 RX ORDER — HALOPERIDOL 5 MG/ML
2 INJECTION INTRAMUSCULAR
Status: DISCONTINUED | OUTPATIENT
Start: 2025-08-06 | End: 2025-08-06 | Stop reason: HOSPADM

## 2025-08-06 RX ORDER — DIAZEPAM 10 MG/2ML
5 INJECTION, SOLUTION INTRAMUSCULAR; INTRAVENOUS
Status: DISCONTINUED | OUTPATIENT
Start: 2025-08-06 | End: 2025-08-06 | Stop reason: HOSPADM

## 2025-08-06 RX ADMIN — SUMATRIPTAN 3 MG: 6 INJECTION, SOLUTION SUBCUTANEOUS at 15:55

## 2025-08-06 RX ADMIN — KETOROLAC TROMETHAMINE 15 MG: 15 INJECTION, SOLUTION INTRAMUSCULAR; INTRAVENOUS at 16:15

## 2025-08-06 RX ADMIN — MAGNESIUM SULFATE HEPTAHYDRATE 2 G: 40 INJECTION, SOLUTION INTRAVENOUS at 15:31

## 2025-08-06 RX ADMIN — DEXAMETHASONE SODIUM PHOSPHATE 10 MG: 10 INJECTION INTRAMUSCULAR; INTRAVENOUS at 15:29

## 2025-08-06 RX ADMIN — ONDANSETRON 4 MG: 2 INJECTION, SOLUTION INTRAMUSCULAR; INTRAVENOUS at 15:27

## 2025-08-06 RX ADMIN — SODIUM CHLORIDE 1000 ML: 0.9 INJECTION, SOLUTION INTRAVENOUS at 15:24

## 2025-08-06 RX ADMIN — DIPHENHYDRAMINE HYDROCHLORIDE 25 MG: 50 INJECTION, SOLUTION INTRAMUSCULAR; INTRAVENOUS at 15:26

## 2025-08-06 ASSESSMENT — ACTIVITIES OF DAILY LIVING (ADL)
ADLS_ACUITY_SCORE: 62

## 2025-08-10 ENCOUNTER — HOSPITAL ENCOUNTER (EMERGENCY)
Facility: CLINIC | Age: 35
Discharge: HOME | End: 2025-08-10
Attending: EMERGENCY MEDICINE
Payer: COMMERCIAL

## 2025-08-10 VITALS
OXYGEN SATURATION: 97 % | HEART RATE: 78 BPM | BODY MASS INDEX: 44.16 KG/M2 | SYSTOLIC BLOOD PRESSURE: 128 MMHG | RESPIRATION RATE: 14 BRPM | WEIGHT: 240 LBS | DIASTOLIC BLOOD PRESSURE: 89 MMHG | TEMPERATURE: 97.5 F | HEIGHT: 62 IN

## 2025-08-10 DIAGNOSIS — R51.9 NONINTRACTABLE EPISODIC HEADACHE, UNSPECIFIED HEADACHE TYPE: Primary | ICD-10-CM

## 2025-08-10 LAB
ANION GAP SERPL CALCULATED.3IONS-SCNC: 11 MMOL/L (ref 7–15)
BUN SERPL-MCNC: 20 MG/DL (ref 6–20)
CALCIUM SERPL-MCNC: 8.8 MG/DL (ref 8.8–10.4)
CHLORIDE SERPL-SCNC: 100 MMOL/L (ref 98–107)
CREAT SERPL-MCNC: 0.65 MG/DL (ref 0.51–0.95)
CRP SERPL-MCNC: <3 MG/L
EGFRCR SERPLBLD CKD-EPI 2021: >90 ML/MIN/1.73M2
ERYTHROCYTE [DISTWIDTH] IN BLOOD BY AUTOMATED COUNT: 12.7 % (ref 10–15)
ERYTHROCYTE [SEDIMENTATION RATE] IN BLOOD BY WESTERGREN METHOD: 15 MM/HR (ref 0–20)
GLUCOSE SERPL-MCNC: 139 MG/DL (ref 70–99)
HCO3 SERPL-SCNC: 27 MMOL/L (ref 22–29)
HCT VFR BLD AUTO: 46.3 % (ref 35–47)
HGB BLD-MCNC: 15.6 G/DL (ref 11.7–15.7)
MAGNESIUM SERPL-MCNC: 2.1 MG/DL (ref 1.7–2.3)
MCH RBC QN AUTO: 30.6 PG (ref 26.5–33)
MCHC RBC AUTO-ENTMCNC: 33.7 G/DL (ref 31.5–36.5)
MCV RBC AUTO: 91 FL (ref 78–100)
PLATELET # BLD AUTO: 301 10E3/UL (ref 150–450)
POTASSIUM SERPL-SCNC: 4 MMOL/L (ref 3.4–5.3)
RBC # BLD AUTO: 5.1 10E6/UL (ref 3.8–5.2)
SODIUM SERPL-SCNC: 138 MMOL/L (ref 135–145)
WBC # BLD AUTO: 12.4 10E3/UL (ref 4–11)

## 2025-08-10 PROCEDURE — 86140 C-REACTIVE PROTEIN: CPT | Performed by: EMERGENCY MEDICINE

## 2025-08-10 PROCEDURE — 96361 HYDRATE IV INFUSION ADD-ON: CPT

## 2025-08-10 PROCEDURE — 258N000003 HC RX IP 258 OP 636: Performed by: EMERGENCY MEDICINE

## 2025-08-10 PROCEDURE — 83735 ASSAY OF MAGNESIUM: CPT | Performed by: EMERGENCY MEDICINE

## 2025-08-10 PROCEDURE — 250N000012 HC RX MED GY IP 250 OP 636 PS 637: Performed by: EMERGENCY MEDICINE

## 2025-08-10 PROCEDURE — 85027 COMPLETE CBC AUTOMATED: CPT | Performed by: EMERGENCY MEDICINE

## 2025-08-10 PROCEDURE — 96375 TX/PRO/DX INJ NEW DRUG ADDON: CPT

## 2025-08-10 PROCEDURE — 96372 THER/PROPH/DIAG INJ SC/IM: CPT | Performed by: EMERGENCY MEDICINE

## 2025-08-10 PROCEDURE — 36415 COLL VENOUS BLD VENIPUNCTURE: CPT | Performed by: EMERGENCY MEDICINE

## 2025-08-10 PROCEDURE — 85652 RBC SED RATE AUTOMATED: CPT | Performed by: EMERGENCY MEDICINE

## 2025-08-10 PROCEDURE — 96374 THER/PROPH/DIAG INJ IV PUSH: CPT

## 2025-08-10 PROCEDURE — 250N000011 HC RX IP 250 OP 636: Performed by: EMERGENCY MEDICINE

## 2025-08-10 PROCEDURE — 99284 EMERGENCY DEPT VISIT MOD MDM: CPT | Mod: 25 | Performed by: EMERGENCY MEDICINE

## 2025-08-10 PROCEDURE — 80048 BASIC METABOLIC PNL TOTAL CA: CPT | Performed by: EMERGENCY MEDICINE

## 2025-08-10 RX ORDER — HEPARIN SODIUM (PORCINE) LOCK FLUSH IV SOLN 100 UNIT/ML 100 UNIT/ML
100 SOLUTION INTRAVENOUS ONCE
Status: COMPLETED | OUTPATIENT
Start: 2025-08-10 | End: 2025-08-10

## 2025-08-10 RX ORDER — DIPHENHYDRAMINE HYDROCHLORIDE 50 MG/ML
25 INJECTION, SOLUTION INTRAMUSCULAR; INTRAVENOUS ONCE
Status: COMPLETED | OUTPATIENT
Start: 2025-08-10 | End: 2025-08-10

## 2025-08-10 RX ORDER — HALOPERIDOL 5 MG/ML
2.5 INJECTION INTRAMUSCULAR ONCE
Status: COMPLETED | OUTPATIENT
Start: 2025-08-10 | End: 2025-08-10

## 2025-08-10 RX ORDER — SUMATRIPTAN 6 MG/.5ML
6 INJECTION, SOLUTION SUBCUTANEOUS ONCE
Status: COMPLETED | OUTPATIENT
Start: 2025-08-10 | End: 2025-08-10

## 2025-08-10 RX ADMIN — SUMATRIPTAN SUCCINATE 6 MG: 6 INJECTION SUBCUTANEOUS at 19:50

## 2025-08-10 RX ADMIN — DIPHENHYDRAMINE HYDROCHLORIDE 25 MG: 50 INJECTION, SOLUTION INTRAMUSCULAR; INTRAVENOUS at 19:48

## 2025-08-10 RX ADMIN — Medication 100 UNITS: at 23:41

## 2025-08-10 RX ADMIN — HALOPERIDOL LACTATE 2.5 MG: 5 INJECTION, SOLUTION INTRAMUSCULAR at 21:05

## 2025-08-10 RX ADMIN — SODIUM CHLORIDE 1000 ML: 9 INJECTION, SOLUTION INTRAVENOUS at 19:51

## 2025-08-10 ASSESSMENT — ACTIVITIES OF DAILY LIVING (ADL)
ADLS_ACUITY_SCORE: 62

## 2025-08-14 ENCOUNTER — HOSPITAL ENCOUNTER (EMERGENCY)
Facility: CLINIC | Age: 35
Discharge: HOME | End: 2025-08-14
Attending: EMERGENCY MEDICINE
Payer: COMMERCIAL

## 2025-08-14 VITALS
SYSTOLIC BLOOD PRESSURE: 135 MMHG | DIASTOLIC BLOOD PRESSURE: 96 MMHG | HEIGHT: 62 IN | RESPIRATION RATE: 18 BRPM | BODY MASS INDEX: 43.79 KG/M2 | OXYGEN SATURATION: 99 % | HEART RATE: 116 BPM | TEMPERATURE: 98 F | WEIGHT: 238 LBS

## 2025-08-14 DIAGNOSIS — R07.9 CHEST PAIN, UNSPECIFIED TYPE: ICD-10-CM

## 2025-08-14 DIAGNOSIS — M54.9 ACUTE MIDLINE BACK PAIN, UNSPECIFIED BACK LOCATION: Primary | ICD-10-CM

## 2025-08-14 LAB
ANION GAP SERPL CALCULATED.3IONS-SCNC: 14 MMOL/L (ref 7–15)
ATRIAL RATE - MUSE: 112 BPM
BASOPHILS # BLD AUTO: <0.03 10E3/UL (ref 0–0.2)
BASOPHILS NFR BLD AUTO: 0.1 %
BUN SERPL-MCNC: 19.9 MG/DL (ref 6–20)
CALCIUM SERPL-MCNC: 8.7 MG/DL (ref 8.8–10.4)
CHLORIDE SERPL-SCNC: 103 MMOL/L (ref 98–107)
CREAT SERPL-MCNC: 0.51 MG/DL (ref 0.51–0.95)
DIASTOLIC BLOOD PRESSURE - MUSE: NORMAL MMHG
EGFRCR SERPLBLD CKD-EPI 2021: >90 ML/MIN/1.73M2
EOSINOPHIL # BLD AUTO: 0.08 10E3/UL (ref 0–0.7)
EOSINOPHIL NFR BLD AUTO: 0.5 %
ERYTHROCYTE [DISTWIDTH] IN BLOOD BY AUTOMATED COUNT: 12.7 % (ref 10–15)
GLUCOSE SERPL-MCNC: 136 MG/DL (ref 70–99)
HCO3 SERPL-SCNC: 21 MMOL/L (ref 22–29)
HCT VFR BLD AUTO: 43.7 % (ref 35–47)
HGB BLD-MCNC: 15.1 G/DL (ref 11.7–15.7)
IMM GRANULOCYTES # BLD: 0.06 10E3/UL
IMM GRANULOCYTES NFR BLD: 0.4 %
INTERPRETATION ECG - MUSE: NORMAL
LYMPHOCYTES # BLD AUTO: 2.12 10E3/UL (ref 0.8–5.3)
LYMPHOCYTES NFR BLD AUTO: 13.1 %
MCH RBC QN AUTO: 30.9 PG (ref 26.5–33)
MCHC RBC AUTO-ENTMCNC: 34.6 G/DL (ref 31.5–36.5)
MCV RBC AUTO: 89.5 FL (ref 78–100)
MONOCYTES # BLD AUTO: 0.83 10E3/UL (ref 0–1.3)
MONOCYTES NFR BLD AUTO: 5.1 %
NEUTROPHILS # BLD AUTO: 13.13 10E3/UL (ref 1.6–8.3)
NEUTROPHILS NFR BLD AUTO: 80.8 %
NRBC # BLD AUTO: <0.03 10E3/UL
NRBC BLD AUTO-RTO: 0 /100
P AXIS - MUSE: 34 DEGREES
PLATELET # BLD AUTO: 235 10E3/UL (ref 150–450)
POTASSIUM SERPL-SCNC: 3.8 MMOL/L (ref 3.4–5.3)
PR INTERVAL - MUSE: 128 MS
QRS DURATION - MUSE: 70 MS
QT - MUSE: 318 MS
QTC - MUSE: 434 MS
R AXIS - MUSE: 60 DEGREES
RBC # BLD AUTO: 4.88 10E6/UL (ref 3.8–5.2)
SODIUM SERPL-SCNC: 138 MMOL/L (ref 135–145)
SYSTOLIC BLOOD PRESSURE - MUSE: NORMAL MMHG
T AXIS - MUSE: -12 DEGREES
TROPONIN T SERPL HS-MCNC: 7 NG/L
VENTRICULAR RATE- MUSE: 112 BPM
WBC # BLD AUTO: 16.23 10E3/UL (ref 4–11)

## 2025-08-14 PROCEDURE — 99284 EMERGENCY DEPT VISIT MOD MDM: CPT | Mod: 25 | Performed by: EMERGENCY MEDICINE

## 2025-08-14 PROCEDURE — 96365 THER/PROPH/DIAG IV INF INIT: CPT

## 2025-08-14 PROCEDURE — 250N000011 HC RX IP 250 OP 636: Performed by: EMERGENCY MEDICINE

## 2025-08-14 PROCEDURE — 93005 ELECTROCARDIOGRAM TRACING: CPT

## 2025-08-14 PROCEDURE — 84484 ASSAY OF TROPONIN QUANT: CPT | Performed by: EMERGENCY MEDICINE

## 2025-08-14 PROCEDURE — 250N000013 HC RX MED GY IP 250 OP 250 PS 637: Performed by: EMERGENCY MEDICINE

## 2025-08-14 PROCEDURE — 85004 AUTOMATED DIFF WBC COUNT: CPT | Performed by: EMERGENCY MEDICINE

## 2025-08-14 PROCEDURE — 80048 BASIC METABOLIC PNL TOTAL CA: CPT | Performed by: EMERGENCY MEDICINE

## 2025-08-14 PROCEDURE — 36415 COLL VENOUS BLD VENIPUNCTURE: CPT | Performed by: EMERGENCY MEDICINE

## 2025-08-14 PROCEDURE — 96375 TX/PRO/DX INJ NEW DRUG ADDON: CPT

## 2025-08-14 RX ORDER — METHOCARBAMOL 500 MG/1
1000 TABLET, FILM COATED ORAL 3 TIMES DAILY
Qty: 30 TABLET | Refills: 0 | Status: SHIPPED | OUTPATIENT
Start: 2025-08-14 | End: 2025-08-19

## 2025-08-14 RX ORDER — DEXAMETHASONE SODIUM PHOSPHATE 10 MG/ML
10 INJECTION, SOLUTION INTRAMUSCULAR; INTRAVENOUS ONCE
Status: COMPLETED | OUTPATIENT
Start: 2025-08-14 | End: 2025-08-14

## 2025-08-14 RX ORDER — LORAZEPAM 0.5 MG/1
0.5 TABLET ORAL ONCE
Status: COMPLETED | OUTPATIENT
Start: 2025-08-14 | End: 2025-08-14

## 2025-08-14 RX ORDER — HYDROMORPHONE HYDROCHLORIDE 2 MG/1
2 TABLET ORAL ONCE
Refills: 0 | Status: COMPLETED | OUTPATIENT
Start: 2025-08-14 | End: 2025-08-14

## 2025-08-14 RX ORDER — MAGNESIUM SULFATE HEPTAHYDRATE 40 MG/ML
2 INJECTION, SOLUTION INTRAVENOUS ONCE
Status: COMPLETED | OUTPATIENT
Start: 2025-08-14 | End: 2025-08-14

## 2025-08-14 RX ORDER — METHOCARBAMOL 500 MG/1
500 TABLET, FILM COATED ORAL ONCE
Status: COMPLETED | OUTPATIENT
Start: 2025-08-14 | End: 2025-08-14

## 2025-08-14 RX ORDER — HYDROCODONE BITARTRATE AND ACETAMINOPHEN 5; 325 MG/1; MG/1
2 TABLET ORAL ONCE
Refills: 0 | Status: DISCONTINUED | OUTPATIENT
Start: 2025-08-14 | End: 2025-08-14

## 2025-08-14 RX ORDER — DIPHENHYDRAMINE HYDROCHLORIDE 50 MG/ML
50 INJECTION, SOLUTION INTRAMUSCULAR; INTRAVENOUS ONCE
Status: COMPLETED | OUTPATIENT
Start: 2025-08-14 | End: 2025-08-14

## 2025-08-14 RX ADMIN — LORAZEPAM 0.5 MG: 0.5 TABLET ORAL at 10:02

## 2025-08-14 RX ADMIN — DIPHENHYDRAMINE HYDROCHLORIDE 50 MG: 50 INJECTION, SOLUTION INTRAMUSCULAR; INTRAVENOUS at 07:43

## 2025-08-14 RX ADMIN — DEXAMETHASONE SODIUM PHOSPHATE 10 MG: 10 INJECTION, SOLUTION INTRAMUSCULAR; INTRAVENOUS at 10:03

## 2025-08-14 RX ADMIN — MAGNESIUM SULFATE HEPTAHYDRATE 2 G: 40 INJECTION, SOLUTION INTRAVENOUS at 07:42

## 2025-08-14 RX ADMIN — HYDROMORPHONE HYDROCHLORIDE 1 MG: 1 INJECTION, SOLUTION INTRAMUSCULAR; INTRAVENOUS; SUBCUTANEOUS at 10:05

## 2025-08-14 RX ADMIN — METHOCARBAMOL 500 MG: 500 TABLET ORAL at 07:37

## 2025-08-14 RX ADMIN — HYDROMORPHONE HYDROCHLORIDE 2 MG: 2 TABLET ORAL at 07:37

## 2025-08-14 ASSESSMENT — ACTIVITIES OF DAILY LIVING (ADL)
ADLS_ACUITY_SCORE: 62

## 2025-08-20 ENCOUNTER — CARE COORDINATION (OUTPATIENT)
Dept: ENDOCRINOLOGY | Facility: CLINIC | Age: 35
End: 2025-08-20
Payer: COMMERCIAL

## 2025-08-20 DIAGNOSIS — E11.9 TYPE 2 DIABETES MELLITUS WITHOUT COMPLICATION, WITHOUT LONG-TERM CURRENT USE OF INSULIN (H): ICD-10-CM

## 2025-08-20 RX ORDER — TIRZEPATIDE 2.5 MG/.5ML
2.5 INJECTION, SOLUTION SUBCUTANEOUS
Qty: 2 ML | Refills: 0 | Status: SHIPPED | OUTPATIENT
Start: 2025-08-20

## 2025-08-21 ENCOUNTER — OFFICE VISIT (OUTPATIENT)
Dept: OPHTHALMOLOGY | Facility: CLINIC | Age: 35
End: 2025-08-21
Payer: COMMERCIAL

## 2025-08-21 DIAGNOSIS — Q07.8 ANOMALOUS OPTIC NERVE (H): Primary | ICD-10-CM

## 2025-08-21 DIAGNOSIS — H10.13 ALLERGIC CONJUNCTIVITIS OF BOTH EYES: ICD-10-CM

## 2025-08-21 PROCEDURE — 92133 CPTRZD OPH DX IMG PST SGM ON: CPT

## 2025-08-21 RX ORDER — OLOPATADINE HYDROCHLORIDE 2 MG/ML
1 SOLUTION OPHTHALMIC DAILY
Qty: 2.5 ML | Refills: 11 | Status: SHIPPED | OUTPATIENT
Start: 2025-08-21

## 2025-08-21 ASSESSMENT — TONOMETRY
IOP_METHOD: ICARE
OD_IOP_MMHG: 15
OS_IOP_MMHG: 13

## 2025-08-21 ASSESSMENT — CUP TO DISC RATIO
OS_RATIO: 0.05
OD_RATIO: 0.05

## 2025-08-21 ASSESSMENT — REFRACTION_WEARINGRX
OS_CYLINDER: +2.00
OS_AXIS: 084
OS_SPHERE: PLANO
OD_SPHERE: +0.25
OD_CYLINDER: +1.00
OD_AXIS: 090

## 2025-08-21 ASSESSMENT — CONF VISUAL FIELD
OS_INFERIOR_NASAL_RESTRICTION: 0
OS_SUPERIOR_NASAL_RESTRICTION: 0
OD_INFERIOR_TEMPORAL_RESTRICTION: 0
OD_SUPERIOR_TEMPORAL_RESTRICTION: 0
OD_NORMAL: 1
OS_NORMAL: 1
OD_INFERIOR_NASAL_RESTRICTION: 0
OS_INFERIOR_TEMPORAL_RESTRICTION: 0
OD_SUPERIOR_NASAL_RESTRICTION: 0
OS_SUPERIOR_TEMPORAL_RESTRICTION: 0

## 2025-08-21 ASSESSMENT — SLIT LAMP EXAM - LIDS
COMMENTS: NORMAL
COMMENTS: NORMAL

## 2025-08-21 ASSESSMENT — EXTERNAL EXAM - LEFT EYE: OS_EXAM: NORMAL

## 2025-08-21 ASSESSMENT — VISUAL ACUITY
OS_CC: 20/30
OS_PH_CC+: +1
OD_CC: 20/20
OS_PH_CC: 20/25
METHOD: SNELLEN - LINEAR

## 2025-08-21 ASSESSMENT — EXTERNAL EXAM - RIGHT EYE: OD_EXAM: NORMAL

## 2025-08-29 ENCOUNTER — VIRTUAL VISIT (OUTPATIENT)
Dept: ENDOCRINOLOGY | Facility: CLINIC | Age: 35
End: 2025-08-29
Payer: COMMERCIAL

## 2025-08-29 VITALS — BODY MASS INDEX: 43.16 KG/M2 | WEIGHT: 236 LBS

## 2025-08-29 DIAGNOSIS — E11.9 TYPE 2 DIABETES MELLITUS WITHOUT COMPLICATION, WITHOUT LONG-TERM CURRENT USE OF INSULIN (H): ICD-10-CM

## 2025-08-29 DIAGNOSIS — E66.813 CLASS 3 SEVERE OBESITY WITH SERIOUS COMORBIDITY AND BODY MASS INDEX (BMI) OF 50.0 TO 59.9 IN ADULT, UNSPECIFIED OBESITY TYPE (H): Primary | ICD-10-CM

## 2025-08-29 PROCEDURE — G2211 COMPLEX E/M VISIT ADD ON: HCPCS | Mod: 95 | Performed by: NURSE PRACTITIONER

## 2025-08-29 PROCEDURE — 98006 SYNCH AUDIO-VIDEO EST MOD 30: CPT | Performed by: NURSE PRACTITIONER

## 2025-08-31 ENCOUNTER — MEDICAL CORRESPONDENCE (OUTPATIENT)
Dept: HEALTH INFORMATION MANAGEMENT | Facility: CLINIC | Age: 35
End: 2025-08-31
Payer: COMMERCIAL

## 2025-09-04 ENCOUNTER — HOSPITAL ENCOUNTER (EMERGENCY)
Facility: CLINIC | Age: 35
Discharge: HOME | End: 2025-09-04
Payer: COMMERCIAL

## 2025-09-04 VITALS
OXYGEN SATURATION: 100 % | BODY MASS INDEX: 43.41 KG/M2 | SYSTOLIC BLOOD PRESSURE: 165 MMHG | TEMPERATURE: 97.2 F | HEART RATE: 88 BPM | HEIGHT: 62 IN | DIASTOLIC BLOOD PRESSURE: 101 MMHG | RESPIRATION RATE: 18 BRPM | WEIGHT: 235.89 LBS

## 2025-09-04 DIAGNOSIS — R07.9 CHEST PAIN, UNSPECIFIED TYPE: Primary | ICD-10-CM

## 2025-09-04 LAB
ANION GAP SERPL CALCULATED.3IONS-SCNC: 11 MMOL/L (ref 7–15)
ATRIAL RATE - MUSE: 89 BPM
BASOPHILS # BLD AUTO: <0.03 10E3/UL (ref 0–0.2)
BASOPHILS NFR BLD AUTO: 0.1 %
BUN SERPL-MCNC: 16.4 MG/DL (ref 6–20)
CALCIUM SERPL-MCNC: 8.8 MG/DL (ref 8.8–10.4)
CHLORIDE SERPL-SCNC: 106 MMOL/L (ref 98–107)
CREAT SERPL-MCNC: 0.56 MG/DL (ref 0.51–0.95)
DIASTOLIC BLOOD PRESSURE - MUSE: NORMAL MMHG
EGFRCR SERPLBLD CKD-EPI 2021: >90 ML/MIN/1.73M2
EOSINOPHIL # BLD AUTO: <0.03 10E3/UL (ref 0–0.7)
EOSINOPHIL NFR BLD AUTO: 0.1 %
ERYTHROCYTE [DISTWIDTH] IN BLOOD BY AUTOMATED COUNT: 13.2 % (ref 10–15)
GLUCOSE SERPL-MCNC: 199 MG/DL (ref 70–99)
HCG INTACT+B SERPL-ACNC: <1 MIU/ML
HCO3 SERPL-SCNC: 22 MMOL/L (ref 22–29)
HCT VFR BLD AUTO: 41.7 % (ref 35–47)
HGB BLD-MCNC: 13.9 G/DL (ref 11.7–15.7)
HOLD SPECIMEN: NORMAL
HOLD SPECIMEN: NORMAL
IMM GRANULOCYTES # BLD: 0.05 10E3/UL
IMM GRANULOCYTES NFR BLD: 0.5 %
INTERPRETATION ECG - MUSE: NORMAL
LYMPHOCYTES # BLD AUTO: 0.59 10E3/UL (ref 0.8–5.3)
LYMPHOCYTES NFR BLD AUTO: 6 %
MCH RBC QN AUTO: 30.2 PG (ref 26.5–33)
MCHC RBC AUTO-ENTMCNC: 33.3 G/DL (ref 31.5–36.5)
MCV RBC AUTO: 90.7 FL (ref 78–100)
MONOCYTES # BLD AUTO: 0.05 10E3/UL (ref 0–1.3)
MONOCYTES NFR BLD AUTO: 0.5 %
NEUTROPHILS # BLD AUTO: 9.16 10E3/UL (ref 1.6–8.3)
NEUTROPHILS NFR BLD AUTO: 92.8 %
NRBC # BLD AUTO: <0.03 10E3/UL
NRBC BLD AUTO-RTO: 0 /100
P AXIS - MUSE: 31 DEGREES
PLATELET # BLD AUTO: 281 10E3/UL (ref 150–450)
POTASSIUM SERPL-SCNC: 3.8 MMOL/L (ref 3.4–5.3)
PR INTERVAL - MUSE: 144 MS
QRS DURATION - MUSE: 78 MS
QT - MUSE: 342 MS
QTC - MUSE: 416 MS
R AXIS - MUSE: 82 DEGREES
RBC # BLD AUTO: 4.6 10E6/UL (ref 3.8–5.2)
SODIUM SERPL-SCNC: 139 MMOL/L (ref 135–145)
SYSTOLIC BLOOD PRESSURE - MUSE: NORMAL MMHG
T AXIS - MUSE: 10 DEGREES
TROPONIN T SERPL HS-MCNC: 10 NG/L
TROPONIN T SERPL HS-MCNC: 7 NG/L
VENTRICULAR RATE- MUSE: 89 BPM
WBC # BLD AUTO: 9.87 10E3/UL (ref 4–11)

## 2025-09-04 PROCEDURE — 84702 CHORIONIC GONADOTROPIN TEST: CPT

## 2025-09-04 PROCEDURE — 250N000011 HC RX IP 250 OP 636

## 2025-09-04 PROCEDURE — 85025 COMPLETE CBC W/AUTO DIFF WBC: CPT

## 2025-09-04 PROCEDURE — 255N000002 HC RX 255 OP 636

## 2025-09-04 PROCEDURE — 250N000009 HC RX 250

## 2025-09-04 PROCEDURE — 36415 COLL VENOUS BLD VENIPUNCTURE: CPT

## 2025-09-04 PROCEDURE — 84484 ASSAY OF TROPONIN QUANT: CPT

## 2025-09-04 PROCEDURE — 250N000013 HC RX MED GY IP 250 OP 250 PS 637

## 2025-09-04 PROCEDURE — 80048 BASIC METABOLIC PNL TOTAL CA: CPT

## 2025-09-04 RX ORDER — DIPHENHYDRAMINE HCL 25 MG
50 CAPSULE ORAL ONCE
Status: COMPLETED | OUTPATIENT
Start: 2025-09-04 | End: 2025-09-04

## 2025-09-04 RX ORDER — HYDROMORPHONE HYDROCHLORIDE 2 MG/1
2 TABLET ORAL ONCE
Refills: 0 | Status: COMPLETED | OUTPATIENT
Start: 2025-09-04 | End: 2025-09-04

## 2025-09-04 RX ORDER — HEPARIN SODIUM (PORCINE) LOCK FLUSH IV SOLN 100 UNIT/ML 100 UNIT/ML
5-10 SOLUTION INTRAVENOUS
Status: DISCONTINUED | OUTPATIENT
Start: 2025-09-04 | End: 2025-09-04 | Stop reason: HOSPADM

## 2025-09-04 RX ADMIN — IOHEXOL 77 ML: 350 INJECTION, SOLUTION INTRAVENOUS at 14:09

## 2025-09-04 RX ADMIN — HEPARIN 5 ML: 100 SYRINGE at 15:47

## 2025-09-04 RX ADMIN — DIPHENHYDRAMINE HYDROCHLORIDE 50 MG: 25 CAPSULE ORAL at 13:38

## 2025-09-04 RX ADMIN — SODIUM CHLORIDE 60 ML: 9 INJECTION, SOLUTION INTRAVENOUS at 14:09

## 2025-09-04 RX ADMIN — HYDROMORPHONE HYDROCHLORIDE 2 MG: 2 TABLET ORAL at 13:38

## 2025-09-04 ASSESSMENT — ACTIVITIES OF DAILY LIVING (ADL)
ADLS_ACUITY_SCORE: 62
ADLS_ACUITY_SCORE: 62

## (undated) DEVICE — SOL WATER IRRIG 1000ML BOTTLE 2F7114

## (undated) DEVICE — PACK ENDOSCOPY GI CUSTOM UMMC

## (undated) DEVICE — SUCTION MANIFOLD DORNOCH ULTRA CART UL-CL500

## (undated) DEVICE — SYR 30ML SLIP TIP W/O NDL 302833

## (undated) DEVICE — TUBING SUCTION 10'X3/16" N510

## (undated) DEVICE — KIT ENDO FIRST STEP DISINFECTANT 200ML W/POUCH EP-4

## (undated) DEVICE — ENDO SNARE POLYPECTOMY OVAL 15MM LOOP SD-240U-15

## (undated) DEVICE — ENDO BITE BLOCK ADULT OMNI-BLOC

## (undated) DEVICE — JELLY LUBRICATING SURGILUBE 2OZ TUBE

## (undated) DEVICE — ENDO FORCEP ENDOJAW BIOPSY 2.8MMX160CM FB-220K

## (undated) DEVICE — SUCTION MANIFOLD NEPTUNE 2 SYS 1 PORT 702-025-000

## (undated) DEVICE — ENDO CAP AND TUBING STERILE FOR ENDOGATOR  100130

## (undated) DEVICE — ENDO DEVICE LOCKING AND BIOPSY CAP M00545261

## (undated) DEVICE — SU ENDO POLYSORB 0 3" LOOP 21" EL-21-L

## (undated) DEVICE — ENDO TUBING CO2 SMARTCAP STERILE DISP 100145CO2EXT

## (undated) DEVICE — BITE BLOCK SCOPE DISP 20 X 27 000429

## (undated) DEVICE — PAD CHUX UNDERPAD 30X30"

## (undated) DEVICE — PREP CHLORAPREP 26ML TINTED ORANGE  260815

## (undated) DEVICE — TUBING SUCTION 6"X3/16" N56A

## (undated) DEVICE — DEVICE RETRIEVAL ROTH NET PLATINUM UNIV 2.5MMX230CM 00715050

## (undated) DEVICE — SU VICRYL 3-0 SH 27" J316H

## (undated) DEVICE — SOL WATER IRRIG 500ML BOTTLE 2F7113

## (undated) DEVICE — SUCTION CANISTER 1000ML 65651-510

## (undated) DEVICE — Device

## (undated) DEVICE — TAPE CLOTH 3" CARDINAL 3TRCL03

## (undated) DEVICE — KIT SCOPE CLEANING ENDOSCOPY BX00719705

## (undated) DEVICE — ENDO FOREIGN BODY HOOD PROTECTOR 69392

## (undated) DEVICE — GLOVE BIOGEL PI ULTRATOUCH G SZ 6.5 42165

## (undated) DEVICE — SNARE CAPTIVATOR II POLYPECTOMY 33X240MM M00561291

## (undated) DEVICE — ENDO BASKET RETRIEVAL MEMORY SOFT WIRE G51525

## (undated) DEVICE — KIT ENDO TURNOVER/PROCEDURE W/CLEAN A SCOPE LINERS 103888

## (undated) DEVICE — SUCTION CANISTER MEDIVAC LINER 3000ML W/LID 65651-530

## (undated) DEVICE — WIRE GUIDE AMPLATZ SUPER STIFF 0.035"X260CM STR M001465090

## (undated) DEVICE — PLATE GROUNDING ADULT W/CORD 9165L

## (undated) DEVICE — TUBING OXYGEN CANNULA NASAL 7FT

## (undated) DEVICE — PAD CHUX UNDERPAD 23X24" 7136

## (undated) DEVICE — PITCHER STERILE 1000ML  SSK9004A

## (undated) DEVICE — ENDO SYSTEM WATER BOTTLE & TUBING W/CO2 FILTER 00711549

## (undated) DEVICE — ENDO FCP GRASPING 6.5MMX2.4MMX230CM RAT TOOTH DGR-276-5

## (undated) DEVICE — CATH SUCTION 14FR W/O CTRL DYND41962

## (undated) DEVICE — PACK AB HYST II

## (undated) DEVICE — ENDO FORCEP BX CAPTURA JUMBO SPIKE 2.8MMX230CM G53042

## (undated) DEVICE — SWABCAP DISINFECTANT GREEN CFF10-250

## (undated) DEVICE — WIPE PREMOIST CLEANSING WASHCLOTHS 7988

## (undated) DEVICE — SYR 03ML LL W/O NDL 309657

## (undated) DEVICE — ENDO TUBING INSUFFLATOR CO2 EFFICIENT 710324

## (undated) DEVICE — ENDO BASKET RETRIEVAL TRAPEZOID 3.0CM 1089

## (undated) DEVICE — LINEN TOWEL PACK X6 WHITE 5487

## (undated) DEVICE — TUBING SUCTION MEDI-VAC 1/4"X20' N620A - HE

## (undated) DEVICE — FORCEP BIOPSY DISP 000386

## (undated) DEVICE — SNARE OVAL SMALL DISP 100600

## (undated) DEVICE — ENDO CATH BALLOON HERCULES W/O GW 12-13.5-15MM G31926

## (undated) DEVICE — FORCEP BIOPSY 2.3MM DISP COATED 000388

## (undated) DEVICE — BITE BLOCK ENDO W/DENTAL RIM 54FR SBT-114-100

## (undated) DEVICE — LINEN FULL SHEET 5511

## (undated) DEVICE — SU OVERSTITCH 2-0 POLYPROPYLENE APOLLO PLY-G02-020-APL

## (undated) DEVICE — ENDO SNARE POLYPECTOMY OVAL 25MM LOOP SD-240U-25

## (undated) DEVICE — KIT CONNECTOR FOR OLYMPUS ENDOSCOPES DEFENDO 100310

## (undated) DEVICE — ENDO FORCEP ENDOJAW BIOPSY 2.8MMX230CM FB-220U

## (undated) DEVICE — ENDO SNARE POLYPECTOMY OVAL 10MM LOOP SD-240U-10

## (undated) DEVICE — ENDO OVERTUBE APOLLO 10-16X27CM OVT-027-160

## (undated) DEVICE — CLIP HEMOCLIP ENDOSCOPIC INSTINCT 2.8X230CM INSC-7-230-SS

## (undated) DEVICE — GOWN IMPERVIOUS BREATHABLE SMART XLG 89045

## (undated) DEVICE — DRAPE SHEET MED 44X70" 9355

## (undated) DEVICE — DRSG GAUZE 4X4" TRAY 6939

## (undated) DEVICE — TUBING SUCTION SIGMOIDOSCOPIC 18FR 6FT

## (undated) DEVICE — SUCTION MANIFOLD NEPTUNE 2 SYS 4 PORT 0702-020-000

## (undated) DEVICE — DEVICE RETRIEVAL ROTH NET FB PED 00711057

## (undated) DEVICE — GOWN XLG DISP 9545

## (undated) DEVICE — TUBING SUCTION MEDI-VAC SOFT 3/16"X20' N520A

## (undated) DEVICE — RAPTOR GRASPING DEVICE

## (undated) DEVICE — LINEN TOWEL PACK X30 5481

## (undated) DEVICE — GLOVE PROTEXIS BLUE W/NEU-THERA 7.5  2D73EB75

## (undated) DEVICE — LINEN TOWEL PACK X5 5464

## (undated) DEVICE — KIT IV START DYNDV1431

## (undated) DEVICE — SPECIMEN CONTAINER 3OZ

## (undated) DEVICE — BASIN SET SINGLE STERILE 13752-624

## (undated) DEVICE — CANNULA NASAL COMFORT SOFT 25FT 0537

## (undated) DEVICE — BIOPSY VALVE BIOSHIELD 00711135

## (undated) DEVICE — ENDO SNARE POLYPECTOMY SPIRAL 20MM LOOP SD-230U-20

## (undated) DEVICE — PAD CHUX UNDERPAD 30X36" P3036C

## (undated) DEVICE — TAPE DURAPORE 2"X1.5YD SILK 1538S-2

## (undated) DEVICE — TUBING SUCTION MEDI-VAC 1/4"X20' N620A

## (undated) DEVICE — PACK MINOR SINGLE BASIN SSK3001

## (undated) DEVICE — SU CINCH OVERSTITCH SINGLE USE APOLLO CNH-G01-000

## (undated) DEVICE — INFLATION DEVICE BIG 60 ENDO-AN6012

## (undated) DEVICE — CONNECTOR ONE-LINK INJECTION SITE LF 7N8399

## (undated) DEVICE — LINEN HALF SHEET 5512

## (undated) DEVICE — ENDO CONNECTOR ENDOGATOR AUX WATER JET FOR OLYMPUS SCOPE

## (undated) DEVICE — TAPE DURAPORE 3" SILK 1538-3

## (undated) DEVICE — GLOVE UNDER INDICATOR PI SZ 6.5 LF 41665

## (undated) DEVICE — WIRE GUIDE 0.035"X260CM NAVIPRO ANG 5625 M00556251

## (undated) DEVICE — KIT PROCEDURE W/CLEAN-A-SCOPE LINERS V2 200800

## (undated) DEVICE — GLOVE SURG PI ULTRA TOUCH M SZ 6-1/2 LF

## (undated) DEVICE — SYR 10ML LL W/O NDL 302995

## (undated) DEVICE — ENDO FORCEP BX CAPTURA LG SPIKE 2.4MMX230CM G53641

## (undated) DEVICE — ENDO BASKET GRASPING FCP 4.5FRX250CM 4 WIRE FG-33W

## (undated) DEVICE — GRASPING DEVICE RAPTOR ALLIGATOR 00711178

## (undated) DEVICE — GLOVE PROTEXIS MICRO 7.0  2D73PM70

## (undated) DEVICE — SU VICRYL 2-0 TIE 54" J615H

## (undated) DEVICE — LINEN GOWN XLG 5407

## (undated) DEVICE — TUBING ENDOGATOR + H2O PORT CO

## (undated) DEVICE — SYR PISTON URETHRAL 60ML 68000

## (undated) DEVICE — STOCKING SLEEVE VASOPRESS COMPRESSION CALF MED 18" VP501M

## (undated) DEVICE — SU PDS II 1 TP-1 48" Z880G

## (undated) DEVICE — DRAPE LEGGINGS CLEAR 8430

## (undated) DEVICE — NET RETRIEVAL ROTH 3X6CM DISP 00711052

## (undated) DEVICE — ESU PENCIL SMOKE EVAC W/ROCKER SWITCH 0703-047-000

## (undated) DEVICE — GLOVE PROTEXIS POWDER FREE SMT 6.5  2D72PT65X

## (undated) DEVICE — STPL SKIN 35W 059037

## (undated) RX ORDER — PROPOFOL 10 MG/ML
INJECTION, EMULSION INTRAVENOUS
Status: DISPENSED
Start: 2025-06-12

## (undated) RX ORDER — SIMETHICONE 40MG/0.6ML
SUSPENSION, DROPS(FINAL DOSAGE FORM)(ML) ORAL
Status: DISPENSED
Start: 2019-12-28

## (undated) RX ORDER — FENTANYL CITRATE 50 UG/ML
INJECTION, SOLUTION INTRAMUSCULAR; INTRAVENOUS
Status: DISPENSED
Start: 2019-12-18

## (undated) RX ORDER — FENTANYL CITRATE 50 UG/ML
INJECTION, SOLUTION INTRAMUSCULAR; INTRAVENOUS
Status: DISPENSED
Start: 2017-12-23

## (undated) RX ORDER — SCOLOPAMINE TRANSDERMAL SYSTEM 1 MG/1
PATCH, EXTENDED RELEASE TRANSDERMAL
Status: DISPENSED
Start: 2019-12-02

## (undated) RX ORDER — DEXAMETHASONE SODIUM PHOSPHATE 10 MG/ML
INJECTION, EMULSION INTRAMUSCULAR; INTRAVENOUS
Status: DISPENSED
Start: 2022-11-30

## (undated) RX ORDER — FENTANYL CITRATE 50 UG/ML
INJECTION, SOLUTION INTRAMUSCULAR; INTRAVENOUS
Status: DISPENSED
Start: 2018-07-28

## (undated) RX ORDER — FENTANYL CITRATE 50 UG/ML
INJECTION, SOLUTION INTRAMUSCULAR; INTRAVENOUS
Status: DISPENSED
Start: 2019-02-20

## (undated) RX ORDER — LIDOCAINE HYDROCHLORIDE 20 MG/ML
INJECTION, SOLUTION EPIDURAL; INFILTRATION; INTRACAUDAL; PERINEURAL
Status: DISPENSED
Start: 2019-12-02

## (undated) RX ORDER — CEFAZOLIN SODIUM 1 G/3ML
INJECTION, POWDER, FOR SOLUTION INTRAMUSCULAR; INTRAVENOUS
Status: DISPENSED
Start: 2020-11-28

## (undated) RX ORDER — GLYCOPYRROLATE 0.2 MG/ML
INJECTION, SOLUTION INTRAMUSCULAR; INTRAVENOUS
Status: DISPENSED
Start: 2020-07-29

## (undated) RX ORDER — PROPOFOL 10 MG/ML
INJECTION, EMULSION INTRAVENOUS
Status: DISPENSED
Start: 2019-12-28

## (undated) RX ORDER — FENTANYL CITRATE 50 UG/ML
INJECTION, SOLUTION INTRAMUSCULAR; INTRAVENOUS
Status: DISPENSED
Start: 2020-11-18

## (undated) RX ORDER — PHENYLEPHRINE HCL IN 0.9% NACL 1 MG/10 ML
SYRINGE (ML) INTRAVENOUS
Status: DISPENSED
Start: 2018-08-04

## (undated) RX ORDER — LIDOCAINE HYDROCHLORIDE 10 MG/ML
INJECTION, SOLUTION EPIDURAL; INFILTRATION; INTRACAUDAL; PERINEURAL
Status: DISPENSED
Start: 2022-02-07

## (undated) RX ORDER — LIDOCAINE HYDROCHLORIDE 20 MG/ML
INJECTION, SOLUTION EPIDURAL; INFILTRATION; INTRACAUDAL; PERINEURAL
Status: DISPENSED
Start: 2020-01-18

## (undated) RX ORDER — ONDANSETRON 2 MG/ML
INJECTION INTRAMUSCULAR; INTRAVENOUS
Status: DISPENSED
Start: 2019-10-09

## (undated) RX ORDER — HYDROMORPHONE HYDROCHLORIDE 1 MG/ML
INJECTION, SOLUTION INTRAMUSCULAR; INTRAVENOUS; SUBCUTANEOUS
Status: DISPENSED
Start: 2020-01-04

## (undated) RX ORDER — SIMETHICONE 40MG/0.6ML
SUSPENSION, DROPS(FINAL DOSAGE FORM)(ML) ORAL
Status: DISPENSED
Start: 2022-07-09

## (undated) RX ORDER — FENTANYL CITRATE-0.9 % NACL/PF 10 MCG/ML
PLASTIC BAG, INJECTION (ML) INTRAVENOUS
Status: DISPENSED
Start: 2020-08-01

## (undated) RX ORDER — LIDOCAINE HYDROCHLORIDE 20 MG/ML
INJECTION, SOLUTION EPIDURAL; INFILTRATION; INTRACAUDAL; PERINEURAL
Status: DISPENSED
Start: 2022-02-08

## (undated) RX ORDER — KETOROLAC TROMETHAMINE 30 MG/ML
INJECTION, SOLUTION INTRAMUSCULAR; INTRAVENOUS
Status: DISPENSED
Start: 2022-07-29

## (undated) RX ORDER — ONDANSETRON 2 MG/ML
INJECTION INTRAMUSCULAR; INTRAVENOUS
Status: DISPENSED
Start: 2017-01-11

## (undated) RX ORDER — DIPHENHYDRAMINE HYDROCHLORIDE 50 MG/ML
INJECTION INTRAMUSCULAR; INTRAVENOUS
Status: DISPENSED
Start: 2020-08-27

## (undated) RX ORDER — ONDANSETRON 2 MG/ML
INJECTION INTRAMUSCULAR; INTRAVENOUS
Status: DISPENSED
Start: 2020-07-11

## (undated) RX ORDER — HYDROMORPHONE HYDROCHLORIDE 1 MG/ML
INJECTION, SOLUTION INTRAMUSCULAR; INTRAVENOUS; SUBCUTANEOUS
Status: DISPENSED
Start: 2019-10-09

## (undated) RX ORDER — EPHEDRINE SULFATE 50 MG/ML
INJECTION, SOLUTION INTRAMUSCULAR; INTRAVENOUS; SUBCUTANEOUS
Status: DISPENSED
Start: 2020-11-28

## (undated) RX ORDER — PROPOFOL 10 MG/ML
INJECTION, EMULSION INTRAVENOUS
Status: DISPENSED
Start: 2018-04-16

## (undated) RX ORDER — HYDROMORPHONE HCL IN WATER/PF 6 MG/30 ML
PATIENT CONTROLLED ANALGESIA SYRINGE INTRAVENOUS
Status: DISPENSED
Start: 2022-07-29

## (undated) RX ORDER — FENTANYL CITRATE 50 UG/ML
INJECTION, SOLUTION INTRAMUSCULAR; INTRAVENOUS
Status: DISPENSED
Start: 2019-12-02

## (undated) RX ORDER — EPHEDRINE SULFATE 50 MG/ML
INJECTION, SOLUTION INTRAMUSCULAR; INTRAVENOUS; SUBCUTANEOUS
Status: DISPENSED
Start: 2020-08-01

## (undated) RX ORDER — FENTANYL CITRATE 50 UG/ML
INJECTION, SOLUTION INTRAMUSCULAR; INTRAVENOUS
Status: DISPENSED
Start: 2020-07-11

## (undated) RX ORDER — DEXAMETHASONE SODIUM PHOSPHATE 4 MG/ML
INJECTION, SOLUTION INTRA-ARTICULAR; INTRALESIONAL; INTRAMUSCULAR; INTRAVENOUS; SOFT TISSUE
Status: DISPENSED
Start: 2025-06-12

## (undated) RX ORDER — ONDANSETRON 2 MG/ML
INJECTION INTRAMUSCULAR; INTRAVENOUS
Status: DISPENSED
Start: 2019-12-02

## (undated) RX ORDER — PROPOFOL 10 MG/ML
INJECTION, EMULSION INTRAVENOUS
Status: DISPENSED
Start: 2020-01-13

## (undated) RX ORDER — FENTANYL CITRATE 50 UG/ML
INJECTION, SOLUTION INTRAMUSCULAR; INTRAVENOUS
Status: DISPENSED
Start: 2019-10-09

## (undated) RX ORDER — PHENYLEPHRINE HCL IN 0.9% NACL 1 MG/10 ML
SYRINGE (ML) INTRAVENOUS
Status: DISPENSED
Start: 2020-01-13

## (undated) RX ORDER — TRAMADOL HYDROCHLORIDE 50 MG/1
TABLET ORAL
Status: DISPENSED
Start: 2019-12-28

## (undated) RX ORDER — HYDROMORPHONE HYDROCHLORIDE 1 MG/ML
INJECTION, SOLUTION INTRAMUSCULAR; INTRAVENOUS; SUBCUTANEOUS
Status: DISPENSED
Start: 2018-07-19

## (undated) RX ORDER — DEXAMETHASONE SODIUM PHOSPHATE 10 MG/ML
INJECTION, EMULSION INTRAMUSCULAR; INTRAVENOUS
Status: DISPENSED
Start: 2022-02-07

## (undated) RX ORDER — ONDANSETRON 2 MG/ML
INJECTION INTRAMUSCULAR; INTRAVENOUS
Status: DISPENSED
Start: 2018-04-16

## (undated) RX ORDER — HYDROMORPHONE HYDROCHLORIDE 1 MG/ML
INJECTION, SOLUTION INTRAMUSCULAR; INTRAVENOUS; SUBCUTANEOUS
Status: DISPENSED
Start: 2020-08-19

## (undated) RX ORDER — DIPHENHYDRAMINE HCL 25 MG
CAPSULE ORAL
Status: DISPENSED
Start: 2020-07-29

## (undated) RX ORDER — LIDOCAINE HYDROCHLORIDE 10 MG/ML
INJECTION, SOLUTION EPIDURAL; INFILTRATION; INTRACAUDAL; PERINEURAL
Status: DISPENSED
Start: 2022-10-23

## (undated) RX ORDER — LIDOCAINE HYDROCHLORIDE 10 MG/ML
INJECTION, SOLUTION EPIDURAL; INFILTRATION; INTRACAUDAL; PERINEURAL
Status: DISPENSED
Start: 2023-03-11

## (undated) RX ORDER — FENTANYL CITRATE 50 UG/ML
INJECTION, SOLUTION INTRAMUSCULAR; INTRAVENOUS
Status: DISPENSED
Start: 2020-08-27

## (undated) RX ORDER — PROMETHAZINE HYDROCHLORIDE 25 MG/ML
INJECTION, SOLUTION INTRAMUSCULAR; INTRAVENOUS
Status: DISPENSED
Start: 2018-03-20

## (undated) RX ORDER — ONDANSETRON 2 MG/ML
INJECTION INTRAMUSCULAR; INTRAVENOUS
Status: DISPENSED
Start: 2020-08-27

## (undated) RX ORDER — FENTANYL CITRATE-0.9 % NACL/PF 10 MCG/ML
PLASTIC BAG, INJECTION (ML) INTRAVENOUS
Status: DISPENSED
Start: 2020-11-28

## (undated) RX ORDER — PROPOFOL 10 MG/ML
INJECTION, EMULSION INTRAVENOUS
Status: DISPENSED
Start: 2020-11-28

## (undated) RX ORDER — PROPOFOL 10 MG/ML
INJECTION, EMULSION INTRAVENOUS
Status: DISPENSED
Start: 2023-03-11

## (undated) RX ORDER — DIPHENHYDRAMINE HYDROCHLORIDE 50 MG/ML
INJECTION, SOLUTION INTRAMUSCULAR; INTRAVENOUS
Status: DISPENSED
Start: 2025-06-16

## (undated) RX ORDER — FENTANYL CITRATE 50 UG/ML
INJECTION, SOLUTION INTRAMUSCULAR; INTRAVENOUS
Status: DISPENSED
Start: 2020-01-03

## (undated) RX ORDER — HYDROMORPHONE HYDROCHLORIDE 1 MG/ML
INJECTION, SOLUTION INTRAMUSCULAR; INTRAVENOUS; SUBCUTANEOUS
Status: DISPENSED
Start: 2018-04-17

## (undated) RX ORDER — FENTANYL CITRATE 50 UG/ML
INJECTION, SOLUTION INTRAMUSCULAR; INTRAVENOUS
Status: DISPENSED
Start: 2017-04-20

## (undated) RX ORDER — FENTANYL CITRATE 50 UG/ML
INJECTION, SOLUTION INTRAMUSCULAR; INTRAVENOUS
Status: DISPENSED
Start: 2020-01-18

## (undated) RX ORDER — FENTANYL CITRATE 50 UG/ML
INJECTION, SOLUTION INTRAMUSCULAR; INTRAVENOUS
Status: DISPENSED
Start: 2018-04-17

## (undated) RX ORDER — LIDOCAINE HYDROCHLORIDE 20 MG/ML
SOLUTION OROPHARYNGEAL
Status: DISPENSED
Start: 2023-03-31

## (undated) RX ORDER — ONDANSETRON 2 MG/ML
INJECTION INTRAMUSCULAR; INTRAVENOUS
Status: DISPENSED
Start: 2017-06-09

## (undated) RX ORDER — PROPOFOL 10 MG/ML
INJECTION, EMULSION INTRAVENOUS
Status: DISPENSED
Start: 2022-10-23

## (undated) RX ORDER — HYDROMORPHONE HYDROCHLORIDE 1 MG/ML
INJECTION, SOLUTION INTRAMUSCULAR; INTRAVENOUS; SUBCUTANEOUS
Status: DISPENSED
Start: 2020-07-11

## (undated) RX ORDER — LIDOCAINE HYDROCHLORIDE 20 MG/ML
INJECTION, SOLUTION EPIDURAL; INFILTRATION; INTRACAUDAL; PERINEURAL
Status: DISPENSED
Start: 2020-07-11

## (undated) RX ORDER — FENTANYL CITRATE 50 UG/ML
INJECTION, SOLUTION INTRAMUSCULAR; INTRAVENOUS
Status: DISPENSED
Start: 2020-07-29

## (undated) RX ORDER — FENTANYL CITRATE 50 UG/ML
INJECTION, SOLUTION INTRAMUSCULAR; INTRAVENOUS
Status: DISPENSED
Start: 2023-01-20

## (undated) RX ORDER — DEXAMETHASONE SODIUM PHOSPHATE 4 MG/ML
INJECTION, SOLUTION INTRA-ARTICULAR; INTRALESIONAL; INTRAMUSCULAR; INTRAVENOUS; SOFT TISSUE
Status: DISPENSED
Start: 2020-07-29

## (undated) RX ORDER — ONDANSETRON 2 MG/ML
INJECTION INTRAMUSCULAR; INTRAVENOUS
Status: DISPENSED
Start: 2019-10-08

## (undated) RX ORDER — HEPARIN SODIUM 1000 [USP'U]/ML
INJECTION, SOLUTION INTRAVENOUS; SUBCUTANEOUS
Status: DISPENSED
Start: 2024-04-02

## (undated) RX ORDER — ONDANSETRON 2 MG/ML
INJECTION INTRAMUSCULAR; INTRAVENOUS
Status: DISPENSED
Start: 2022-11-30

## (undated) RX ORDER — DIPHENHYDRAMINE HYDROCHLORIDE 50 MG/ML
INJECTION INTRAMUSCULAR; INTRAVENOUS
Status: DISPENSED
Start: 2022-08-24

## (undated) RX ORDER — DEXAMETHASONE SODIUM PHOSPHATE 4 MG/ML
INJECTION, SOLUTION INTRA-ARTICULAR; INTRALESIONAL; INTRAMUSCULAR; INTRAVENOUS; SOFT TISSUE
Status: DISPENSED
Start: 2020-01-13

## (undated) RX ORDER — EPHEDRINE SULFATE 50 MG/ML
INJECTION, SOLUTION INTRAMUSCULAR; INTRAVENOUS; SUBCUTANEOUS
Status: DISPENSED
Start: 2020-01-18

## (undated) RX ORDER — HEPARIN SODIUM (PORCINE) LOCK FLUSH IV SOLN 100 UNIT/ML 100 UNIT/ML
SOLUTION INTRAVENOUS
Status: DISPENSED
Start: 2025-07-01

## (undated) RX ORDER — HYDROMORPHONE HYDROCHLORIDE 1 MG/ML
INJECTION, SOLUTION INTRAMUSCULAR; INTRAVENOUS; SUBCUTANEOUS
Status: DISPENSED
Start: 2018-08-04

## (undated) RX ORDER — FENTANYL CITRATE 50 UG/ML
INJECTION, SOLUTION INTRAMUSCULAR; INTRAVENOUS
Status: DISPENSED
Start: 2019-12-28

## (undated) RX ORDER — PROPOFOL 10 MG/ML
INJECTION, EMULSION INTRAVENOUS
Status: DISPENSED
Start: 2018-07-28

## (undated) RX ORDER — FENTANYL CITRATE 50 UG/ML
INJECTION, SOLUTION INTRAMUSCULAR; INTRAVENOUS
Status: DISPENSED
Start: 2019-12-17

## (undated) RX ORDER — NEOSTIGMINE METHYLSULFATE 1 MG/ML
VIAL (ML) INJECTION
Status: DISPENSED
Start: 2020-07-29

## (undated) RX ORDER — FENTANYL CITRATE 50 UG/ML
INJECTION, SOLUTION INTRAMUSCULAR; INTRAVENOUS
Status: DISPENSED
Start: 2017-06-09

## (undated) RX ORDER — ONDANSETRON 2 MG/ML
INJECTION INTRAMUSCULAR; INTRAVENOUS
Status: DISPENSED
Start: 2020-07-29

## (undated) RX ORDER — FENTANYL CITRATE 50 UG/ML
INJECTION, SOLUTION INTRAMUSCULAR; INTRAVENOUS
Status: DISPENSED
Start: 2024-04-02

## (undated) RX ORDER — DIPHENHYDRAMINE HYDROCHLORIDE 50 MG/ML
INJECTION INTRAMUSCULAR; INTRAVENOUS
Status: DISPENSED
Start: 2022-02-09

## (undated) RX ORDER — LIDOCAINE HYDROCHLORIDE AND EPINEPHRINE 10; 10 MG/ML; UG/ML
INJECTION, SOLUTION INFILTRATION; PERINEURAL
Status: DISPENSED
Start: 2025-07-01

## (undated) RX ORDER — ACETAMINOPHEN 325 MG/1
TABLET ORAL
Status: DISPENSED
Start: 2020-11-28

## (undated) RX ORDER — FENTANYL CITRATE 50 UG/ML
INJECTION, SOLUTION INTRAMUSCULAR; INTRAVENOUS
Status: DISPENSED
Start: 2018-04-16

## (undated) RX ORDER — FENTANYL CITRATE 50 UG/ML
INJECTION, SOLUTION INTRAMUSCULAR; INTRAVENOUS
Status: DISPENSED
Start: 2017-01-11

## (undated) RX ORDER — ACETAMINOPHEN 500 MG
TABLET ORAL
Status: DISPENSED
Start: 2024-12-16

## (undated) RX ORDER — HYDROMORPHONE HYDROCHLORIDE 1 MG/ML
INJECTION, SOLUTION INTRAMUSCULAR; INTRAVENOUS; SUBCUTANEOUS
Status: DISPENSED
Start: 2020-09-18

## (undated) RX ORDER — HYDROMORPHONE HYDROCHLORIDE 1 MG/ML
INJECTION, SOLUTION INTRAMUSCULAR; INTRAVENOUS; SUBCUTANEOUS
Status: DISPENSED
Start: 2018-07-31

## (undated) RX ORDER — PROPOFOL 10 MG/ML
INJECTION, EMULSION INTRAVENOUS
Status: DISPENSED
Start: 2020-01-18

## (undated) RX ORDER — HYDROMORPHONE HYDROCHLORIDE 1 MG/ML
INJECTION, SOLUTION INTRAMUSCULAR; INTRAVENOUS; SUBCUTANEOUS
Status: DISPENSED
Start: 2019-10-07

## (undated) RX ORDER — HYDROMORPHONE HYDROCHLORIDE 1 MG/ML
INJECTION, SOLUTION INTRAMUSCULAR; INTRAVENOUS; SUBCUTANEOUS
Status: DISPENSED
Start: 2020-08-27

## (undated) RX ORDER — SIMETHICONE 40MG/0.6ML
SUSPENSION, DROPS(FINAL DOSAGE FORM)(ML) ORAL
Status: DISPENSED
Start: 2019-12-17

## (undated) RX ORDER — FENTANYL CITRATE 50 UG/ML
INJECTION, SOLUTION INTRAMUSCULAR; INTRAVENOUS
Status: DISPENSED
Start: 2022-07-29

## (undated) RX ORDER — LIDOCAINE HYDROCHLORIDE 10 MG/ML
INJECTION, SOLUTION EPIDURAL; INFILTRATION; INTRACAUDAL; PERINEURAL
Status: DISPENSED
Start: 2025-07-01

## (undated) RX ORDER — DIPHENHYDRAMINE HYDROCHLORIDE 50 MG/ML
INJECTION INTRAMUSCULAR; INTRAVENOUS
Status: DISPENSED
Start: 2022-09-17

## (undated) RX ORDER — DIPHENHYDRAMINE HYDROCHLORIDE 50 MG/ML
INJECTION INTRAMUSCULAR; INTRAVENOUS
Status: DISPENSED
Start: 2018-07-31

## (undated) RX ORDER — LIDOCAINE HYDROCHLORIDE 10 MG/ML
INJECTION, SOLUTION INFILTRATION; PERINEURAL
Status: DISPENSED
Start: 2024-04-02

## (undated) RX ORDER — FENTANYL CITRATE 50 UG/ML
INJECTION, SOLUTION INTRAMUSCULAR; INTRAVENOUS
Status: DISPENSED
Start: 2022-02-15

## (undated) RX ORDER — FENTANYL CITRATE 50 UG/ML
INJECTION, SOLUTION INTRAMUSCULAR; INTRAVENOUS
Status: DISPENSED
Start: 2019-12-13

## (undated) RX ORDER — KETOROLAC TROMETHAMINE 30 MG/ML
INJECTION, SOLUTION INTRAMUSCULAR; INTRAVENOUS
Status: DISPENSED
Start: 2022-02-08

## (undated) RX ORDER — FENTANYL CITRATE 50 UG/ML
INJECTION, SOLUTION INTRAMUSCULAR; INTRAVENOUS
Status: DISPENSED
Start: 2020-01-13

## (undated) RX ORDER — PROPOFOL 10 MG/ML
INJECTION, EMULSION INTRAVENOUS
Status: DISPENSED
Start: 2020-07-29

## (undated) RX ORDER — HYDROMORPHONE HYDROCHLORIDE 1 MG/ML
INJECTION, SOLUTION INTRAMUSCULAR; INTRAVENOUS; SUBCUTANEOUS
Status: DISPENSED
Start: 2019-12-14

## (undated) RX ORDER — FENTANYL CITRATE 50 UG/ML
INJECTION, SOLUTION INTRAMUSCULAR; INTRAVENOUS
Status: DISPENSED
Start: 2022-09-17

## (undated) RX ORDER — FENTANYL CITRATE 50 UG/ML
INJECTION, SOLUTION INTRAMUSCULAR; INTRAVENOUS
Status: DISPENSED
Start: 2022-02-09

## (undated) RX ORDER — FENTANYL CITRATE 50 UG/ML
INJECTION, SOLUTION INTRAMUSCULAR; INTRAVENOUS
Status: DISPENSED
Start: 2020-01-04

## (undated) RX ORDER — SIMETHICONE 40MG/0.6ML
SUSPENSION, DROPS(FINAL DOSAGE FORM)(ML) ORAL
Status: DISPENSED
Start: 2020-08-01

## (undated) RX ORDER — DEXAMETHASONE SODIUM PHOSPHATE 4 MG/ML
INJECTION, SOLUTION INTRA-ARTICULAR; INTRALESIONAL; INTRAMUSCULAR; INTRAVENOUS; SOFT TISSUE
Status: DISPENSED
Start: 2022-02-08

## (undated) RX ORDER — KETOROLAC TROMETHAMINE 30 MG/ML
INJECTION, SOLUTION INTRAMUSCULAR; INTRAVENOUS
Status: DISPENSED
Start: 2022-07-09

## (undated) RX ORDER — HYDROMORPHONE HCL IN WATER/PF 6 MG/30 ML
PATIENT CONTROLLED ANALGESIA SYRINGE INTRAVENOUS
Status: DISPENSED
Start: 2022-08-24

## (undated) RX ORDER — CIPROFLOXACIN 2 MG/ML
INJECTION, SOLUTION INTRAVENOUS
Status: DISPENSED
Start: 2019-02-20

## (undated) RX ORDER — FENTANYL CITRATE 50 UG/ML
INJECTION, SOLUTION INTRAMUSCULAR; INTRAVENOUS
Status: DISPENSED
Start: 2018-03-20

## (undated) RX ORDER — LIDOCAINE HYDROCHLORIDE 10 MG/ML
INJECTION, SOLUTION EPIDURAL; INFILTRATION; INTRACAUDAL; PERINEURAL
Status: DISPENSED
Start: 2025-06-11

## (undated) RX ORDER — FENTANYL CITRATE 50 UG/ML
INJECTION, SOLUTION INTRAMUSCULAR; INTRAVENOUS
Status: DISPENSED
Start: 2020-08-19

## (undated) RX ORDER — HYDROMORPHONE HCL/0.9% NACL/PF 0.2MG/0.2
SYRINGE (ML) INTRAVENOUS
Status: DISPENSED
Start: 2020-07-11

## (undated) RX ORDER — PROPOFOL 10 MG/ML
INJECTION, EMULSION INTRAVENOUS
Status: DISPENSED
Start: 2022-11-30

## (undated) RX ORDER — HYDROMORPHONE HYDROCHLORIDE 1 MG/ML
INJECTION, SOLUTION INTRAMUSCULAR; INTRAVENOUS; SUBCUTANEOUS
Status: DISPENSED
Start: 2019-12-28

## (undated) RX ORDER — EPHEDRINE SULFATE 50 MG/ML
INJECTION, SOLUTION INTRAMUSCULAR; INTRAVENOUS; SUBCUTANEOUS
Status: DISPENSED
Start: 2018-08-04

## (undated) RX ORDER — SODIUM CHLORIDE, SODIUM LACTATE, POTASSIUM CHLORIDE, CALCIUM CHLORIDE 600; 310; 30; 20 MG/100ML; MG/100ML; MG/100ML; MG/100ML
INJECTION, SOLUTION INTRAVENOUS
Status: DISPENSED
Start: 2020-08-19

## (undated) RX ORDER — HYDROMORPHONE HYDROCHLORIDE 1 MG/ML
INJECTION, SOLUTION INTRAMUSCULAR; INTRAVENOUS; SUBCUTANEOUS
Status: DISPENSED
Start: 2017-01-11

## (undated) RX ORDER — DIPHENHYDRAMINE HYDROCHLORIDE 50 MG/ML
INJECTION INTRAMUSCULAR; INTRAVENOUS
Status: DISPENSED
Start: 2022-12-28

## (undated) RX ORDER — DEXAMETHASONE SODIUM PHOSPHATE 4 MG/ML
INJECTION, SOLUTION INTRA-ARTICULAR; INTRALESIONAL; INTRAMUSCULAR; INTRAVENOUS; SOFT TISSUE
Status: DISPENSED
Start: 2020-11-28

## (undated) RX ORDER — KETOROLAC TROMETHAMINE 30 MG/ML
INJECTION, SOLUTION INTRAMUSCULAR; INTRAVENOUS
Status: DISPENSED
Start: 2022-02-15

## (undated) RX ORDER — HYDROMORPHONE HYDROCHLORIDE 1 MG/ML
INJECTION, SOLUTION INTRAMUSCULAR; INTRAVENOUS; SUBCUTANEOUS
Status: DISPENSED
Start: 2022-02-09

## (undated) RX ORDER — GLYCOPYRROLATE 0.2 MG/ML
INJECTION, SOLUTION INTRAMUSCULAR; INTRAVENOUS
Status: DISPENSED
Start: 2020-01-13

## (undated) RX ORDER — SIMETHICONE 40MG/0.6ML
SUSPENSION, DROPS(FINAL DOSAGE FORM)(ML) ORAL
Status: DISPENSED
Start: 2022-02-15

## (undated) RX ORDER — FENTANYL CITRATE 50 UG/ML
INJECTION, SOLUTION INTRAMUSCULAR; INTRAVENOUS
Status: DISPENSED
Start: 2022-02-08

## (undated) RX ORDER — DIPHENHYDRAMINE HYDROCHLORIDE 50 MG/ML
INJECTION INTRAMUSCULAR; INTRAVENOUS
Status: DISPENSED
Start: 2020-11-18

## (undated) RX ORDER — OXYCODONE HYDROCHLORIDE 5 MG/1
TABLET ORAL
Status: DISPENSED
Start: 2019-12-02

## (undated) RX ORDER — FENTANYL CITRATE 50 UG/ML
INJECTION, SOLUTION INTRAMUSCULAR; INTRAVENOUS
Status: DISPENSED
Start: 2018-07-19

## (undated) RX ORDER — SODIUM CHLORIDE, SODIUM LACTATE, POTASSIUM CHLORIDE, CALCIUM CHLORIDE 600; 310; 30; 20 MG/100ML; MG/100ML; MG/100ML; MG/100ML
INJECTION, SOLUTION INTRAVENOUS
Status: DISPENSED
Start: 2019-10-07

## (undated) RX ORDER — PROPOFOL 10 MG/ML
INJECTION, EMULSION INTRAVENOUS
Status: DISPENSED
Start: 2019-12-02

## (undated) RX ORDER — FENTANYL CITRATE 50 UG/ML
INJECTION, SOLUTION INTRAMUSCULAR; INTRAVENOUS
Status: DISPENSED
Start: 2020-08-01

## (undated) RX ORDER — FENTANYL CITRATE 50 UG/ML
INJECTION, SOLUTION INTRAMUSCULAR; INTRAVENOUS
Status: DISPENSED
Start: 2020-08-18

## (undated) RX ORDER — PHENYLEPHRINE HCL IN 0.9% NACL 1 MG/10 ML
SYRINGE (ML) INTRAVENOUS
Status: DISPENSED
Start: 2020-01-18

## (undated) RX ORDER — FENTANYL CITRATE 50 UG/ML
INJECTION, SOLUTION INTRAMUSCULAR; INTRAVENOUS
Status: DISPENSED
Start: 2018-07-31

## (undated) RX ORDER — ONDANSETRON 2 MG/ML
INJECTION INTRAMUSCULAR; INTRAVENOUS
Status: DISPENSED
Start: 2022-02-08

## (undated) RX ORDER — IOPAMIDOL 510 MG/ML
INJECTION, SOLUTION INTRAVASCULAR
Status: DISPENSED
Start: 2020-11-18

## (undated) RX ORDER — LIDOCAINE HYDROCHLORIDE 20 MG/ML
SOLUTION OROPHARYNGEAL
Status: DISPENSED
Start: 2022-10-24

## (undated) RX ORDER — FENTANYL CITRATE 50 UG/ML
INJECTION, SOLUTION INTRAMUSCULAR; INTRAVENOUS
Status: DISPENSED
Start: 2018-08-04

## (undated) RX ORDER — LIDOCAINE HYDROCHLORIDE 20 MG/ML
INJECTION, SOLUTION EPIDURAL; INFILTRATION; INTRACAUDAL; PERINEURAL
Status: DISPENSED
Start: 2018-04-16

## (undated) RX ORDER — DIPHENHYDRAMINE HYDROCHLORIDE 50 MG/ML
INJECTION INTRAMUSCULAR; INTRAVENOUS
Status: DISPENSED
Start: 2020-07-29

## (undated) RX ORDER — ONDANSETRON 2 MG/ML
INJECTION INTRAMUSCULAR; INTRAVENOUS
Status: DISPENSED
Start: 2018-07-19

## (undated) RX ORDER — PROPOFOL 10 MG/ML
INJECTION, EMULSION INTRAVENOUS
Status: DISPENSED
Start: 2019-12-17

## (undated) RX ORDER — MAGNESIUM SULFATE HEPTAHYDRATE 40 MG/ML
INJECTION, SOLUTION INTRAVENOUS
Status: DISPENSED
Start: 2022-07-09

## (undated) RX ORDER — DEXAMETHASONE SODIUM PHOSPHATE 4 MG/ML
INJECTION, SOLUTION INTRA-ARTICULAR; INTRALESIONAL; INTRAMUSCULAR; INTRAVENOUS; SOFT TISSUE
Status: DISPENSED
Start: 2020-07-11

## (undated) RX ORDER — SODIUM CHLORIDE, SODIUM LACTATE, POTASSIUM CHLORIDE, CALCIUM CHLORIDE 600; 310; 30; 20 MG/100ML; MG/100ML; MG/100ML; MG/100ML
INJECTION, SOLUTION INTRAVENOUS
Status: DISPENSED
Start: 2017-01-11

## (undated) RX ORDER — ONDANSETRON 2 MG/ML
INJECTION INTRAMUSCULAR; INTRAVENOUS
Status: DISPENSED
Start: 2019-12-18

## (undated) RX ORDER — HYDROMORPHONE HCL/0.9% NACL/PF 0.2MG/0.2
SYRINGE (ML) INTRAVENOUS
Status: DISPENSED
Start: 2017-06-09

## (undated) RX ORDER — PROPOFOL 10 MG/ML
INJECTION, EMULSION INTRAVENOUS
Status: DISPENSED
Start: 2020-01-05

## (undated) RX ORDER — ONDANSETRON 2 MG/ML
INJECTION INTRAMUSCULAR; INTRAVENOUS
Status: DISPENSED
Start: 2017-04-20

## (undated) RX ORDER — GLYCOPYRROLATE 0.2 MG/ML
INJECTION, SOLUTION INTRAMUSCULAR; INTRAVENOUS
Status: DISPENSED
Start: 2022-02-08

## (undated) RX ORDER — LIDOCAINE HYDROCHLORIDE 10 MG/ML
INJECTION, SOLUTION EPIDURAL; INFILTRATION; INTRACAUDAL; PERINEURAL
Status: DISPENSED
Start: 2024-04-16

## (undated) RX ORDER — MANNITOL 20 G/100ML
INJECTION, SOLUTION INTRAVENOUS
Status: DISPENSED
Start: 2022-02-08

## (undated) RX ORDER — DIPHENHYDRAMINE HYDROCHLORIDE 50 MG/ML
INJECTION INTRAMUSCULAR; INTRAVENOUS
Status: DISPENSED
Start: 2020-01-13

## (undated) RX ORDER — DEXTROSE MONOHYDRATE 25 G/50ML
INJECTION, SOLUTION INTRAVENOUS
Status: DISPENSED
Start: 2022-02-08

## (undated) RX ORDER — SODIUM CHLORIDE, SODIUM LACTATE, POTASSIUM CHLORIDE, CALCIUM CHLORIDE 600; 310; 30; 20 MG/100ML; MG/100ML; MG/100ML; MG/100ML
INJECTION, SOLUTION INTRAVENOUS
Status: DISPENSED
Start: 2019-10-09

## (undated) RX ORDER — DEXAMETHASONE SODIUM PHOSPHATE 4 MG/ML
INJECTION, SOLUTION INTRA-ARTICULAR; INTRALESIONAL; INTRAMUSCULAR; INTRAVENOUS; SOFT TISSUE
Status: DISPENSED
Start: 2024-03-29

## (undated) RX ORDER — DIPHENHYDRAMINE HYDROCHLORIDE 50 MG/ML
INJECTION INTRAMUSCULAR; INTRAVENOUS
Status: DISPENSED
Start: 2019-12-14

## (undated) RX ORDER — HYDROMORPHONE HYDROCHLORIDE 1 MG/ML
INJECTION, SOLUTION INTRAMUSCULAR; INTRAVENOUS; SUBCUTANEOUS
Status: DISPENSED
Start: 2019-02-20

## (undated) RX ORDER — ONDANSETRON 2 MG/ML
INJECTION INTRAMUSCULAR; INTRAVENOUS
Status: DISPENSED
Start: 2025-06-12

## (undated) RX ORDER — DIPHENHYDRAMINE HYDROCHLORIDE 50 MG/ML
INJECTION INTRAMUSCULAR; INTRAVENOUS
Status: DISPENSED
Start: 2022-02-08

## (undated) RX ORDER — PROPOFOL 10 MG/ML
INJECTION, EMULSION INTRAVENOUS
Status: DISPENSED
Start: 2020-09-18

## (undated) RX ORDER — LIDOCAINE HYDROCHLORIDE 20 MG/ML
INJECTION, SOLUTION EPIDURAL; INFILTRATION; INTRACAUDAL; PERINEURAL
Status: DISPENSED
Start: 2020-08-01

## (undated) RX ORDER — ONDANSETRON 2 MG/ML
INJECTION INTRAMUSCULAR; INTRAVENOUS
Status: DISPENSED
Start: 2019-12-28

## (undated) RX ORDER — SIMETHICONE 40MG/0.6ML
SUSPENSION, DROPS(FINAL DOSAGE FORM)(ML) ORAL
Status: DISPENSED
Start: 2017-04-20

## (undated) RX ORDER — CALCIUM CHLORIDE 100 MG/ML
INJECTION INTRAVENOUS; INTRAVENTRICULAR
Status: DISPENSED
Start: 2022-02-08

## (undated) RX ORDER — PROPOFOL 10 MG/ML
INJECTION, EMULSION INTRAVENOUS
Status: DISPENSED
Start: 2018-08-04

## (undated) RX ORDER — DIPHENHYDRAMINE HYDROCHLORIDE 50 MG/ML
INJECTION INTRAMUSCULAR; INTRAVENOUS
Status: DISPENSED
Start: 2019-12-18

## (undated) RX ORDER — SODIUM CHLORIDE, SODIUM LACTATE, POTASSIUM CHLORIDE, CALCIUM CHLORIDE 600; 310; 30; 20 MG/100ML; MG/100ML; MG/100ML; MG/100ML
INJECTION, SOLUTION INTRAVENOUS
Status: DISPENSED
Start: 2019-12-14

## (undated) RX ORDER — DEXAMETHASONE SODIUM PHOSPHATE 4 MG/ML
INJECTION, SOLUTION INTRA-ARTICULAR; INTRALESIONAL; INTRAMUSCULAR; INTRAVENOUS; SOFT TISSUE
Status: DISPENSED
Start: 2018-04-16

## (undated) RX ORDER — ONDANSETRON 2 MG/ML
INJECTION INTRAMUSCULAR; INTRAVENOUS
Status: DISPENSED
Start: 2020-01-13

## (undated) RX ORDER — DEXAMETHASONE SODIUM PHOSPHATE 4 MG/ML
INJECTION, SOLUTION INTRA-ARTICULAR; INTRALESIONAL; INTRAMUSCULAR; INTRAVENOUS; SOFT TISSUE
Status: DISPENSED
Start: 2019-10-09

## (undated) RX ORDER — DIPHENHYDRAMINE HYDROCHLORIDE 50 MG/ML
INJECTION INTRAMUSCULAR; INTRAVENOUS
Status: DISPENSED
Start: 2019-12-28

## (undated) RX ORDER — DIPHENHYDRAMINE HYDROCHLORIDE 50 MG/ML
INJECTION, SOLUTION INTRAMUSCULAR; INTRAVENOUS
Status: DISPENSED
Start: 2025-07-01

## (undated) RX ORDER — PROPOFOL 10 MG/ML
INJECTION, EMULSION INTRAVENOUS
Status: DISPENSED
Start: 2020-07-11

## (undated) RX ORDER — DIPHENHYDRAMINE HYDROCHLORIDE 50 MG/ML
INJECTION INTRAMUSCULAR; INTRAVENOUS
Status: DISPENSED
Start: 2019-12-13

## (undated) RX ORDER — FENTANYL CITRATE-0.9 % NACL/PF 10 MCG/ML
PLASTIC BAG, INJECTION (ML) INTRAVENOUS
Status: DISPENSED
Start: 2020-08-27

## (undated) RX ORDER — ONDANSETRON 2 MG/ML
INJECTION INTRAMUSCULAR; INTRAVENOUS
Status: DISPENSED
Start: 2022-02-09

## (undated) RX ORDER — FENTANYL CITRATE 50 UG/ML
INJECTION, SOLUTION INTRAMUSCULAR; INTRAVENOUS
Status: DISPENSED
Start: 2020-03-29

## (undated) RX ORDER — FENTANYL CITRATE 50 UG/ML
INJECTION, SOLUTION INTRAMUSCULAR; INTRAVENOUS
Status: DISPENSED
Start: 2020-09-18

## (undated) RX ORDER — HYDROMORPHONE HYDROCHLORIDE 1 MG/ML
INJECTION, SOLUTION INTRAMUSCULAR; INTRAVENOUS; SUBCUTANEOUS
Status: DISPENSED
Start: 2020-07-29

## (undated) RX ORDER — ONDANSETRON 2 MG/ML
INJECTION INTRAMUSCULAR; INTRAVENOUS
Status: DISPENSED
Start: 2018-03-20

## (undated) RX ORDER — FENTANYL CITRATE 50 UG/ML
INJECTION, SOLUTION INTRAMUSCULAR; INTRAVENOUS
Status: DISPENSED
Start: 2025-07-01

## (undated) RX ORDER — DIPHENHYDRAMINE HYDROCHLORIDE 50 MG/ML
INJECTION INTRAMUSCULAR; INTRAVENOUS
Status: DISPENSED
Start: 2022-07-29

## (undated) RX ORDER — NALBUPHINE HYDROCHLORIDE 20 MG/ML
INJECTION, SOLUTION INTRAMUSCULAR; INTRAVENOUS; SUBCUTANEOUS
Status: DISPENSED
Start: 2020-07-11

## (undated) RX ORDER — DEXAMETHASONE SODIUM PHOSPHATE 4 MG/ML
INJECTION, SOLUTION INTRA-ARTICULAR; INTRALESIONAL; INTRAMUSCULAR; INTRAVENOUS; SOFT TISSUE
Status: DISPENSED
Start: 2022-07-29

## (undated) RX ORDER — LIDOCAINE HYDROCHLORIDE 20 MG/ML
INJECTION, SOLUTION EPIDURAL; INFILTRATION; INTRACAUDAL; PERINEURAL
Status: DISPENSED
Start: 2020-11-28

## (undated) RX ORDER — FENTANYL CITRATE 50 UG/ML
INJECTION, SOLUTION INTRAMUSCULAR; INTRAVENOUS
Status: DISPENSED
Start: 2022-12-28

## (undated) RX ORDER — IOPAMIDOL 755 MG/ML
INJECTION, SOLUTION INTRAVASCULAR
Status: DISPENSED
Start: 2019-12-02

## (undated) RX ORDER — PROPOFOL 10 MG/ML
INJECTION, EMULSION INTRAVENOUS
Status: DISPENSED
Start: 2020-08-01

## (undated) RX ORDER — ONDANSETRON 2 MG/ML
INJECTION INTRAMUSCULAR; INTRAVENOUS
Status: DISPENSED
Start: 2022-02-07

## (undated) RX ORDER — PROPOFOL 10 MG/ML
INJECTION, EMULSION INTRAVENOUS
Status: DISPENSED
Start: 2022-11-29

## (undated) RX ORDER — DIPHENHYDRAMINE HYDROCHLORIDE 50 MG/ML
INJECTION INTRAMUSCULAR; INTRAVENOUS
Status: DISPENSED
Start: 2018-03-20

## (undated) RX ORDER — DIPHENHYDRAMINE HYDROCHLORIDE 50 MG/ML
INJECTION INTRAMUSCULAR; INTRAVENOUS
Status: DISPENSED
Start: 2018-07-28

## (undated) RX ORDER — ONDANSETRON 2 MG/ML
INJECTION INTRAMUSCULAR; INTRAVENOUS
Status: DISPENSED
Start: 2018-07-28

## (undated) RX ORDER — SIMETHICONE 20 MG/.3ML
EMULSION ORAL
Status: DISPENSED
Start: 2018-04-16

## (undated) RX ORDER — SCOLOPAMINE TRANSDERMAL SYSTEM 1 MG/1
PATCH, EXTENDED RELEASE TRANSDERMAL
Status: DISPENSED
Start: 2018-03-20

## (undated) RX ORDER — HYDROMORPHONE HYDROCHLORIDE 1 MG/ML
INJECTION, SOLUTION INTRAMUSCULAR; INTRAVENOUS; SUBCUTANEOUS
Status: DISPENSED
Start: 2019-12-02

## (undated) RX ORDER — NALBUPHINE HYDROCHLORIDE 20 MG/ML
INJECTION, SOLUTION INTRAMUSCULAR; INTRAVENOUS; SUBCUTANEOUS
Status: DISPENSED
Start: 2020-07-29

## (undated) RX ORDER — HYDROMORPHONE HYDROCHLORIDE 1 MG/ML
INJECTION, SOLUTION INTRAMUSCULAR; INTRAVENOUS; SUBCUTANEOUS
Status: DISPENSED
Start: 2020-01-13

## (undated) RX ORDER — ONDANSETRON 2 MG/ML
INJECTION INTRAMUSCULAR; INTRAVENOUS
Status: DISPENSED
Start: 2022-10-23

## (undated) RX ORDER — HYDROMORPHONE HYDROCHLORIDE 1 MG/ML
INJECTION, SOLUTION INTRAMUSCULAR; INTRAVENOUS; SUBCUTANEOUS
Status: DISPENSED
Start: 2019-10-08

## (undated) RX ORDER — SODIUM CHLORIDE 9 MG/ML
INJECTION, SOLUTION INTRAVENOUS
Status: DISPENSED
Start: 2022-09-17

## (undated) RX ORDER — LIDOCAINE HYDROCHLORIDE 20 MG/ML
INJECTION, SOLUTION EPIDURAL; INFILTRATION; INTRACAUDAL; PERINEURAL
Status: DISPENSED
Start: 2020-07-29

## (undated) RX ORDER — PROMETHAZINE HYDROCHLORIDE 25 MG/ML
INJECTION, SOLUTION INTRAMUSCULAR; INTRAVENOUS
Status: DISPENSED
Start: 2018-04-17

## (undated) RX ORDER — ONDANSETRON 2 MG/ML
INJECTION INTRAMUSCULAR; INTRAVENOUS
Status: DISPENSED
Start: 2020-11-28

## (undated) RX ORDER — ONDANSETRON 2 MG/ML
INJECTION INTRAMUSCULAR; INTRAVENOUS
Status: DISPENSED
Start: 2018-04-17

## (undated) RX ORDER — FENTANYL CITRATE 50 UG/ML
INJECTION, SOLUTION INTRAMUSCULAR; INTRAVENOUS
Status: DISPENSED
Start: 2022-10-23

## (undated) RX ORDER — LIDOCAINE HYDROCHLORIDE 20 MG/ML
INJECTION, SOLUTION EPIDURAL; INFILTRATION; INTRACAUDAL; PERINEURAL
Status: DISPENSED
Start: 2019-12-28

## (undated) RX ORDER — LIDOCAINE HYDROCHLORIDE 20 MG/ML
INJECTION, SOLUTION EPIDURAL; INFILTRATION; INTRACAUDAL; PERINEURAL
Status: DISPENSED
Start: 2020-01-05

## (undated) RX ORDER — DEXAMETHASONE SODIUM PHOSPHATE 10 MG/ML
INJECTION, EMULSION INTRAMUSCULAR; INTRAVENOUS
Status: DISPENSED
Start: 2022-10-23

## (undated) RX ORDER — HYDROMORPHONE HYDROCHLORIDE 1 MG/ML
INJECTION, SOLUTION INTRAMUSCULAR; INTRAVENOUS; SUBCUTANEOUS
Status: DISPENSED
Start: 2019-12-13

## (undated) RX ORDER — PROPOFOL 10 MG/ML
INJECTION, EMULSION INTRAVENOUS
Status: DISPENSED
Start: 2019-12-13

## (undated) RX ORDER — LIDOCAINE HYDROCHLORIDE 20 MG/ML
INJECTION, SOLUTION EPIDURAL; INFILTRATION; INTRACAUDAL; PERINEURAL
Status: DISPENSED
Start: 2020-09-18

## (undated) RX ORDER — DIPHENHYDRAMINE HYDROCHLORIDE 50 MG/ML
INJECTION INTRAMUSCULAR; INTRAVENOUS
Status: DISPENSED
Start: 2020-01-04

## (undated) RX ORDER — CLINDAMYCIN PHOSPHATE 900 MG/50ML
INJECTION, SOLUTION INTRAVENOUS
Status: DISPENSED
Start: 2025-07-01

## (undated) RX ORDER — LIDOCAINE HYDROCHLORIDE 10 MG/ML
INJECTION, SOLUTION EPIDURAL; INFILTRATION; INTRACAUDAL; PERINEURAL
Status: DISPENSED
Start: 2025-06-13

## (undated) RX ORDER — FENTANYL CITRATE 50 UG/ML
INJECTION, SOLUTION INTRAMUSCULAR; INTRAVENOUS
Status: DISPENSED
Start: 2022-08-24

## (undated) RX ORDER — SIMETHICONE 40MG/0.6ML
SUSPENSION, DROPS(FINAL DOSAGE FORM)(ML) ORAL
Status: DISPENSED
Start: 2020-07-29

## (undated) RX ORDER — LIDOCAINE HYDROCHLORIDE 20 MG/ML
INJECTION, SOLUTION EPIDURAL; INFILTRATION; INTRACAUDAL; PERINEURAL
Status: DISPENSED
Start: 2020-01-13

## (undated) RX ORDER — ROCURONIUM BROMIDE 50 MG/5 ML
SYRINGE (ML) INTRAVENOUS
Status: DISPENSED
Start: 2022-02-08

## (undated) RX ORDER — ONDANSETRON 2 MG/ML
INJECTION INTRAMUSCULAR; INTRAVENOUS
Status: DISPENSED
Start: 2018-08-04

## (undated) RX ORDER — SODIUM CHLORIDE, SODIUM LACTATE, POTASSIUM CHLORIDE, CALCIUM CHLORIDE 600; 310; 30; 20 MG/100ML; MG/100ML; MG/100ML; MG/100ML
INJECTION, SOLUTION INTRAVENOUS
Status: DISPENSED
Start: 2018-07-19

## (undated) RX ORDER — PROPOFOL 10 MG/ML
INJECTION, EMULSION INTRAVENOUS
Status: DISPENSED
Start: 2022-02-07